# Patient Record
Sex: MALE | Race: BLACK OR AFRICAN AMERICAN | NOT HISPANIC OR LATINO
[De-identification: names, ages, dates, MRNs, and addresses within clinical notes are randomized per-mention and may not be internally consistent; named-entity substitution may affect disease eponyms.]

---

## 2016-12-29 NOTE — ED ADULT NURSE NOTE - PSH
AICD (Automatic Cardioverter/Defibrillator) Present  [employer] with 1 [employer] lead4/1/09- explanted and replaced with [employer] 2 leads on 9/2/09  History of Prior Ablation Treatment    Status post left hip replacement

## 2016-12-29 NOTE — ED ADULT NURSE NOTE - PMH
CHF (Congestive Heart Failure)    H/O prior ablation treatment  for Afib  HTN    Non-Ischemic Cardiomyopathy    PAF (paroxysmal atrial fibrillation)    SVT (Supraventricular Tachycardia)    Ventricular fibrillation

## 2016-12-29 NOTE — ED PROVIDER NOTE - PROGRESS NOTE DETAILS
MD CHO:  Discussed pt with his cardiologist, Dr. Kessler, requests admission for CHF exacerbation, will give lasix 40 mg.  Will call cardiology for admission.

## 2016-12-29 NOTE — ED PROVIDER NOTE - OBJECTIVE STATEMENT
65 y/o male with h/o aicd, non-isch CM, afib with sob x2 days, gradual onset.  Inc olivera today.  Feels more fatigued than baseline.  Denies f/c, ha, neck stiffness, cp, palpitations, abd pain, n/v/d, dysuria.

## 2016-12-29 NOTE — ED ADULT TRIAGE NOTE - CHIEF COMPLAINT QUOTE
Pt.  c/o sob since yesterday with dizziness. Pt states was walking today and became sob. Pt denies chest pain. Pt with AICD. Pt placed on o2 in triage.

## 2016-12-29 NOTE — ED PROVIDER NOTE - PSH
AICD (Automatic Cardioverter/Defibrillator) Present  [employer] with 1 [employer] lead4/1/09- explanted and replaced with [employer] 2 leads on 9/2/09  History of Prior Ablation Treatment    Status post left hip replacement

## 2016-12-29 NOTE — H&P ADULT. - PSH
AICD (Automatic Cardioverter/Defibrillator) Present  [employer] with 1 [employer] lead4/1/09- explanted and replaced with [employer] 2 leads on 9/2/09  History of Prior Ablation Treatment    Status post left hip replacement

## 2016-12-29 NOTE — H&P ADULT. - RS GEN PE MLT RESP DETAILS PC
airway patent/no rhonchi/good air movement/no wheezes/no rales/normal/clear to auscultation bilaterally/no chest wall tenderness/respirations non-labored

## 2016-12-29 NOTE — ED PROVIDER NOTE - CARE PLAN
Principal Discharge DX:	Acute congestive heart failure, unspecified congestive heart failure type  Secondary Diagnosis:	SOB (shortness of breath)

## 2016-12-29 NOTE — ED PROVIDER NOTE - MEDICAL DECISION MAKING DETAILS
65 y/o male with h/o non-isch cm, aicd here with sob and olivera x2 days, worsening.  CHF exac vs pna vs lyte abn vs acs.  Obtain cbc, bmp, ce's, bnp, cxr, ekg.  Antic admission.

## 2016-12-29 NOTE — H&P ADULT. - ASSESSMENT
65 y/o male, with a PmHx of PAF s/p ablation on Xarelto, AICD, CHF, presented with SOB and is being admitted to telemetry for acute on chronic heart failure.

## 2016-12-29 NOTE — H&P ADULT. - HISTORY OF PRESENT ILLNESS
65 y/o male, PMHx of [facility] ICD, A.fib (on Xarelto), HTN, presenting complaining of SOB x a couple of days and dizziness this morning. Patient states that over the past couple of days he has noticed that he finds himself having to "catch his breath." States that he notices his shortness of breath when he is walking, claiming that over the past couple of days he is only able to walk about 1 block before he feels short of breath. Claims he only feels SOB with exertion and is fine at rest. Patient states that this morning, around 10:30am, he also noticed he felt a little lightheaded and dizzy as he was walking out of his house. He described it as a spinning sensation, and says he felt like he was going to pass out but did not. Patient states he needed to hold onto the fence for support and went back inside his house to sit down. At that time his neighbor came over, called EMS and the patient was brought to the ED. The patient admits to having an intermittent, dry, non-productive cough that has been coming and going over the past week. Admits to a couple pound weight gain over the past month. He denies falling, LOC, changes in vision, chest pain, palpitations, wheezing, pleuritic chest pain, abdominal pain, claudication, PND, orthopnea, LE edema, back pain. He does not use home oxygen. Upon examination in the ED patient denies feeling lightheaded/dizzy, or short of breath.

## 2016-12-29 NOTE — H&P ADULT. - NEGATIVE CARDIOVASCULAR SYMPTOMS
no claudication/no palpitations/no orthopnea/no paroxysmal nocturnal dyspnea/no peripheral edema/no chest pain

## 2016-12-29 NOTE — H&P ADULT. - PROBLEM SELECTOR PLAN 1
Admit to telemetry.   Lasix 40 IV BID  Strict I&O's.  Daily Weights.   1500mL fluid restriction.  Obtain echocardiogram.   F/u MD note.

## 2016-12-30 NOTE — PHYSICAL THERAPY INITIAL EVALUATION ADULT - RANGE OF MOTION EXAMINATION, REHAB EVAL
bilateral lower extremity ROM was WFL (within functional limits)/+ bilateral genu varus/bilateral upper extremity ROM was WFL (within functional limits)

## 2017-01-01 NOTE — H&P ADULT. - ADMIT DATE
Central Line Procedure Note    Staff:     Anesthesiologist:  CELI JACKSON  Location: OR after induction    patient identified, IV checked, risks and benefits discussed, informed consent, monitors and equipment checked and pre-op evaluation    Line Placement:     Procedure:  Central Line    Insertion Site:  Femoral    Laterality:      Position:  Supine    Maximal Sterile Barriers: All elements of maximal sterile barrier technique used       (Maximal sterile barriers include:  Sterile gown, sterile gloves, hat, mask, whole body drape, hand hygiene and acceptable skin prep.)    Sterile Prep: Chloraprep        Local skin infiltration:  None    Injection Technique:  Ultrasound guided and Seldinger Technique    Sterile ultrasound technique:  Sterile Probe Cover and Sterile Gel    Vein evaluated via U/S for patency/adequacy of catheter insertion and is adequate.  Using realtime U/S imaging the vein was punctured, and needle was observed entering vein on U/S      A permanent image is NOT entered into the patient's record.      Catheter size:  4 Fr, 8 cm 2 lumen    Cath secured with: suture    Dressing:  Tegaderm and Biopatch    Complications:  None apparent    Blood aspirated all lumens: Yes      All Lumens Flushed: Yes      Verification method:  Placement to be verified post-op  Assessment/Narrative:      Attempted central venous catheter insertion in right internal jugular vein, aborted due to inability to thread wire, vein accessed x 3, successful insertion of left femoral central venous catheter with live US-guided Seldinger technique, catheter thread with ease, no complications.    Violetta Burr MD  Pediatric Anesthesiologist  Pager: [phone]             29-Dec-2016

## 2017-01-03 NOTE — DIETITIAN INITIAL EVALUATION ADULT. - OTHER INFO
Nutrition consult was ordered for RD visit. 57y/o male admitted with HTN, CHF, reports to have good po and appetite with no  GI Issues'. Pt  mentioned that he prefers fast food and seems to have lack of nutrition related knowledge. Labs reviewed , Current diet remain appropriate . Nutrition education provided  regarding therapeutic diet. RD remain available.

## 2017-01-03 NOTE — DIETITIAN INITIAL EVALUATION ADULT. - NS AS NUTRI INTERV ED CONTENT
Nutrition relationship to health/disease/Recommended modifications/Purpose of the nutrition education

## 2017-01-03 NOTE — DIETITIAN INITIAL EVALUATION ADULT. - NS AS NUTRI DX KNOWLEDGE BELIEFS
Unsupported beliefs/attitudes about food or nutrition-relate/Self - monitoring deficit/Food - and nutrition - related knowledge deficit

## 2017-01-04 ENCOUNTER — TRANSCRIPTION ENCOUNTER (OUTPATIENT)
Age: 67
End: 2017-01-04

## 2017-01-04 NOTE — DISCHARGE NOTE ADULT - NS MD DC FALL RISK RISK
For information on Fall & Injury Prevention, visit [email or web address]

## 2017-01-04 NOTE — DISCHARGE NOTE ADULT - CARE PROVIDER_API CALL
Pratima Noyola), Internal Medicine  [address] [facility] [place] [address]  Phone: [phone]  Fax: [phone] Ricky Kessler (MD), Cardiac Electrophysiology; Cardiovascular Disease; Internal Medicine  [address] [address] [place] [address]  Phone: [phone]  Fax: [phone]

## 2017-01-04 NOTE — DISCHARGE NOTE ADULT - PATIENT PORTAL LINK FT
“You can access the Follow[employer] Patient Portal, offered by [facility], by registering with the following website: [email or web address]”

## 2017-01-04 NOTE — DISCHARGE NOTE ADULT - CARE PLAN
Principal Discharge DX:	Acute congestive heart failure, unspecified congestive heart failure type  Goal:	Maintain euvolemia and prevent worsening of heart failure.  Instructions for follow-up, activity and diet:	please take less salt, monitor your fluid intake, follow up with your doctor in 1-2 weeks  Secondary Diagnosis:	Atrial fibrillation  Goal:	please continue to take your blood thinner as prescribed and follow with your doctor to monitor your levels  Instructions for follow-up, activity and diet:	please monitor your Heart rate, take your medications as prescribed, low salt, low fat, exercise as tolerated  Secondary Diagnosis:	Hypertension  Goal:	keep your blood pressure under control by taking your medications as prescribed  Instructions for follow-up, activity and diet:	low salt, low fat, exercise regularly, please follow up with your PMD in 1-2 weeks for further medical management Principal Discharge DX:	Acute congestive heart failure, unspecified congestive heart failure type  Goal:	Maintain euvolemia and prevent worsening of heart failure.  Instructions for follow-up, activity and diet:	please take less salt, monitor your fluid intake, follow up with your doctor in 1-2 weeks  Secondary Diagnosis:	Atrial fibrillation  Goal:	please continue to take your blood thinner as prescribed and follow with your doctor to monitor your levels  Instructions for follow-up, activity and diet:	please monitor your Heart rate, take your medications as prescribed, low salt, low fat, exercise as tolerated  Secondary Diagnosis:	Hypertension  Goal:	keep your blood pressure under control by taking your medications as prescribed  Instructions for follow-up, activity and diet:	low salt, low fat, exercise regularly, please follow up with your PMD in 1-2 weeks for further medical management  Secondary Diagnosis:	Ventricular tachycardia  Instructions for follow-up, activity and diet:	Please follow up with Dr. Kessler on 1/13/2017 @  12PM. Take your coreg as prescribed.

## 2017-01-04 NOTE — DISCHARGE NOTE ADULT - HOSPITAL COURSE
65 y/o male, with a PmHx of PAF s/p ablation on ac, AICD, CHF. Left Hip replacement, presented with SOB admitted to telemetry for acute on chronic heart failure.    + Acute on Chronic Heart failure - on Lasix 40 iv bid  + Prolonged QT: Tikosyn d/c'd 12/30  EKG: Afib @ 92, PVC's. T inv V4-6  EKG- QTC- 570>>539  CE x 2 neg                 BNP: 3,454  12/29 CXR - clear lungs  1/3 ECHO:  EF- 23%, 1. Thickened, tethered mitral valve leaflets.  Moderate mitral regurgitation. 2. Moderately dilated left atrium.  LA volume index = 42 cc/m2. 3. Normal left ventricular internal dimensions and wall thicknesses. 4. Severe global left ventricular systolic dysfunction. 5. Right ventricular enlargement with decreased right ventricular systolic function. 6. Normal pericardium with no pericardial effusion. *** Compared with echocardiogram of 9/1/2009, no significant changes noted. 65 y/o male, with a PmHx of PAF s/p ablation on ac, AICD, CHF. Left Hip replacement, presented with SOB admitted to telemetry for acute on chronic heart failure.    + Acute on Chronic Heart failure - on Lasix 40 iv bid  + Prolonged QT: Tikosyn d/c'd 12/30  EKG: Afib @ 92, PVC's. T inv V4-6  EKG- QTC- 570>>539  CE x 2 neg                 BNP: 3,454  12/29 CXR - clear lungs  1/3 TTE:  EF- 23%, 1. Thickened, tethered mitral valve leaflets.  Moderate mitral regurgitation. 2. Moderately dilated left atrium.  LA volume index = 42 cc/m2. 3. Normal left ventricular internal dimensions and wall thicknesses. 4. Severe global left ventricular systolic dysfunction. 5. Right ventricular enlargement with decreased right ventricular systolic function. 6. Normal pericardium with no pericardial effusion. *** Compared with echocardiogram of 9/1/2009, no significant changes noted.  . Co  ICD was interrogated showing  one episode of VF on 11/4/2016 which resolved after one shock. Tikosyn discontinued due to prolonged QT. Coreg increased to reduce risk of VT/VF. No antiarrythmic on discharge due to previous prolonged QT. May be added as outpatient. Cleared for discharge.

## 2017-01-04 NOTE — DISCHARGE NOTE ADULT - PLAN OF CARE
Maintain euvolemia and prevent worsening of heart failure. please take less salt, monitor your fluid intake, follow up with your doctor in 1-2 weeks please continue to take your blood thinner as prescribed and follow with your doctor to monitor your levels please monitor your Heart rate, take your medications as prescribed, low salt, low fat, exercise as tolerated keep your blood pressure under control by taking your medications as prescribed low salt, low fat, exercise regularly, please follow up with your PMD in 1-2 weeks for further medical management Please follow up with Dr. Kessler on 1/13/2017 @  12PM. Take your coreg as prescribed.

## 2017-01-04 NOTE — DISCHARGE NOTE ADULT - MEDICATION SUMMARY - MEDICATIONS TO TAKE
I will START or STAY ON the medications listed below when I get home from the hospital:    enalapril 2.5 mg oral tablet  -- 1 tab(s) by mouth once a day  -- Indication: For Hypertension    Xarelto 20 mg oral tablet  -- 1 tab(s) by mouth once a day (in the evening)  -- Indication: For Atrial fibrillation    carvedilol 25 mg oral tablet  -- 1 tab(s) by mouth every 12 hours  -- Indication: For Ventricular Tachycardia    furosemide 40 mg oral tablet  -- 1 tab(s) by mouth 2 times a day  -- Indication: For Heart failure    folic acid 1 mg oral tablet  -- 1 tab(s) by mouth once a day  -- Indication: For Supplement

## 2017-01-04 NOTE — DISCHARGE NOTE ADULT - CARE PROVIDERS DIRECT ADDRESSES
[email or web address],DirectAddress_Unknown [email or web address],DirectAddress_Unknown

## 2017-01-04 NOTE — DISCHARGE NOTE ADULT - MEDICATION SUMMARY - MEDICATIONS TO STOP TAKING
I will STOP taking the medications listed below when I get home from the hospital:    Tikosyn 250 mcg oral capsule  -- 1 cap(s) by mouth 2 times a day

## 2017-01-04 NOTE — DISCHARGE NOTE ADULT - MEDICATION SUMMARY - MEDICATIONS TO CHANGE
I will SWITCH the dose or number of times a day I take the medications listed below when I get home from the hospital:    furosemide 40 mg oral tablet  -- 1 tab(s) by mouth once a day    enalapril 10 mg oral tablet  -- 1 tab(s) by mouth once a day    carvedilol 12.5 mg oral tablet  -- 1 tab(s) by mouth 2 times a day

## 2017-01-13 ENCOUNTER — NON-APPOINTMENT (OUTPATIENT)
Age: 67
End: 2017-01-13

## 2017-01-13 ENCOUNTER — APPOINTMENT (OUTPATIENT)
Dept: ELECTROPHYSIOLOGY | Facility: CLINIC | Age: 67
End: 2017-01-13

## 2017-01-13 VITALS
BODY MASS INDEX: 25.31 KG/M2 | WEIGHT: 167 LBS | DIASTOLIC BLOOD PRESSURE: 70 MMHG | SYSTOLIC BLOOD PRESSURE: 90 MMHG | HEIGHT: 68 IN

## 2017-01-17 ENCOUNTER — APPOINTMENT (OUTPATIENT)
Dept: ORTHOPEDIC SURGERY | Facility: CLINIC | Age: 67
End: 2017-01-17

## 2017-01-23 ENCOUNTER — INPATIENT (INPATIENT)
Facility: HOSPITAL | Age: 67
LOS: 6 days | Discharge: HOME | End: 2017-01-30
Attending: INTERNAL MEDICINE | Admitting: INTERNAL MEDICINE
Payer: MEDICARE

## 2017-01-23 VITALS
HEART RATE: 92 BPM | TEMPERATURE: 98 F | SYSTOLIC BLOOD PRESSURE: 98 MMHG | OXYGEN SATURATION: 94 % | DIASTOLIC BLOOD PRESSURE: 58 MMHG | RESPIRATION RATE: 16 BRPM

## 2017-01-23 DIAGNOSIS — R94.31 ABNORMAL ELECTROCARDIOGRAM [ECG] [EKG]: ICD-10-CM

## 2017-01-23 DIAGNOSIS — I50.9 HEART FAILURE, UNSPECIFIED: ICD-10-CM

## 2017-01-23 LAB
ALBUMIN SERPL ELPH-MCNC: 3.8 G/DL — SIGNIFICANT CHANGE UP (ref 3.3–5)
ALP SERPL-CCNC: 66 U/L — SIGNIFICANT CHANGE UP (ref 40–120)
ALT FLD-CCNC: 36 U/L — SIGNIFICANT CHANGE UP (ref 4–41)
APTT BLD: 36 SEC — SIGNIFICANT CHANGE UP (ref 27.5–37.4)
AST SERPL-CCNC: 33 U/L — SIGNIFICANT CHANGE UP (ref 4–40)
BASOPHILS # BLD AUTO: 0.02 K/UL — SIGNIFICANT CHANGE UP (ref 0–0.2)
BASOPHILS NFR BLD AUTO: 0.3 % — SIGNIFICANT CHANGE UP (ref 0–2)
BILIRUB SERPL-MCNC: 0.9 MG/DL — SIGNIFICANT CHANGE UP (ref 0.2–1.2)
BUN SERPL-MCNC: 36 MG/DL — HIGH (ref 7–23)
CALCIUM SERPL-MCNC: 8.8 MG/DL — SIGNIFICANT CHANGE UP (ref 8.4–10.5)
CHLORIDE SERPL-SCNC: 109 MMOL/L — HIGH (ref 98–107)
CK MB BLD-MCNC: 2.01 NG/ML — SIGNIFICANT CHANGE UP (ref 1–6.6)
CK MB BLD-MCNC: SIGNIFICANT CHANGE UP (ref 0–2.5)
CK SERPL-CCNC: 90 U/L — SIGNIFICANT CHANGE UP (ref 30–200)
CO2 SERPL-SCNC: 20 MMOL/L — LOW (ref 22–31)
CREAT SERPL-MCNC: 1.4 MG/DL — HIGH (ref 0.5–1.3)
EOSINOPHIL # BLD AUTO: 0.1 K/UL — SIGNIFICANT CHANGE UP (ref 0–0.5)
EOSINOPHIL NFR BLD AUTO: 1.4 % — SIGNIFICANT CHANGE UP (ref 0–6)
GLUCOSE SERPL-MCNC: 85 MG/DL — SIGNIFICANT CHANGE UP (ref 70–99)
HCT VFR BLD CALC: 38.7 % — LOW (ref 39–50)
HGB BLD-MCNC: 12.2 G/DL — LOW (ref 13–17)
IMM GRANULOCYTES NFR BLD AUTO: 0.1 % — SIGNIFICANT CHANGE UP (ref 0–1.5)
INR BLD: 1.72 — HIGH (ref 0.87–1.18)
LYMPHOCYTES # BLD AUTO: 2.32 K/UL — SIGNIFICANT CHANGE UP (ref 1–3.3)
LYMPHOCYTES # BLD AUTO: 31.6 % — SIGNIFICANT CHANGE UP (ref 13–44)
MAGNESIUM SERPL-MCNC: 2.4 MG/DL — SIGNIFICANT CHANGE UP (ref 1.6–2.6)
MCHC RBC-ENTMCNC: 31.1 PG — SIGNIFICANT CHANGE UP (ref 27–34)
MCHC RBC-ENTMCNC: 31.5 % — LOW (ref 32–36)
MCV RBC AUTO: 98.7 FL — SIGNIFICANT CHANGE UP (ref 80–100)
MONOCYTES # BLD AUTO: 0.71 K/UL — SIGNIFICANT CHANGE UP (ref 0–0.9)
MONOCYTES NFR BLD AUTO: 9.7 % — SIGNIFICANT CHANGE UP (ref 2–14)
NEUTROPHILS # BLD AUTO: 4.19 K/UL — SIGNIFICANT CHANGE UP (ref 1.8–7.4)
NEUTROPHILS NFR BLD AUTO: 56.9 % — SIGNIFICANT CHANGE UP (ref 43–77)
NT-PROBNP SERPL-SCNC: 5900 PG/ML — SIGNIFICANT CHANGE UP
PLATELET # BLD AUTO: 239 K/UL — SIGNIFICANT CHANGE UP (ref 150–400)
PMV BLD: 11.5 FL — SIGNIFICANT CHANGE UP (ref 7–13)
POTASSIUM SERPL-MCNC: 4.8 MMOL/L — SIGNIFICANT CHANGE UP (ref 3.5–5.3)
POTASSIUM SERPL-SCNC: 4.8 MMOL/L — SIGNIFICANT CHANGE UP (ref 3.5–5.3)
PROT SERPL-MCNC: 7.3 G/DL — SIGNIFICANT CHANGE UP (ref 6–8.3)
PROTHROM AB SERPL-ACNC: 19.7 SEC — HIGH (ref 10–13.1)
RBC # BLD: 3.92 M/UL — LOW (ref 4.2–5.8)
RBC # FLD: 16.1 % — HIGH (ref 10.3–14.5)
SODIUM SERPL-SCNC: 144 MMOL/L — SIGNIFICANT CHANGE UP (ref 135–145)
TROPONIN T SERPL-MCNC: < 0.06 NG/ML — SIGNIFICANT CHANGE UP (ref 0–0.06)
WBC # BLD: 7.35 K/UL — SIGNIFICANT CHANGE UP (ref 3.8–10.5)
WBC # FLD AUTO: 7.35 K/UL — SIGNIFICANT CHANGE UP (ref 3.8–10.5)

## 2017-01-23 PROCEDURE — 71010: CPT | Mod: 26

## 2017-01-23 RX ORDER — ASPIRIN/CALCIUM CARB/MAGNESIUM 324 MG
162 TABLET ORAL ONCE
Qty: 0 | Refills: 0 | Status: COMPLETED | OUTPATIENT
Start: 2017-01-23 | End: 2017-01-23

## 2017-01-23 RX ORDER — FUROSEMIDE 40 MG
40 TABLET ORAL ONCE
Qty: 0 | Refills: 0 | Status: COMPLETED | OUTPATIENT
Start: 2017-01-23 | End: 2017-01-23

## 2017-01-23 RX ADMIN — Medication 162 MILLIGRAM(S): at 18:38

## 2017-01-23 RX ADMIN — Medication 40 MILLIGRAM(S): at 18:38

## 2017-01-23 NOTE — ED PROVIDER NOTE - PSH
AICD (Automatic Cardioverter/Defibrillator) Present  [employer] with 1 [employer] lead4/1/09- explanted and replaced with [employer] 2 leads on 9/2/09  History of Prior Ablation Treatment    Status post left hip replacement

## 2017-01-23 NOTE — ED ADULT TRIAGE NOTE - CHIEF COMPLAINT QUOTE
Arrives via EMS with c/o SOB, but as per pt SOB is located in abdomen.  Pt denies any chest pain.  Currently on O2 4L NC.

## 2017-01-23 NOTE — ED ADULT NURSE NOTE - OBJECTIVE STATEMENT
(break hortensia RN) Rec'd 65 YO M in rm 3, c/o SOB/LOBATO since last night. Pt states he was hospitalized here last week for CHF. Pt A&OX3, NAD, VSS as noted. +tachypnea. +crackles bilat. +2 pedal edema bilat. IV accessed and labs sent by facilitator. PT medicated per order. Family at bedside. Will monitor. RF

## 2017-01-23 NOTE — ED ADULT NURSE NOTE - PSH
AICD (Automatic Cardioverter/Defibrillator) Present  [employer] with 1 [employer] lead4/1/09- explanted and replaced with [employer] 2 leads on 9/2/09  History of Prior Ablation Treatment    Status post left hip replacement

## 2017-01-23 NOTE — ED PROVIDER NOTE - MEDICAL DECISION MAKING DETAILS
67yo male with a pmh of PAF s/p ablation on xarelto, AICD, CHF. Left Hip replacement, presented with SOB for 1 day - r/o acs vs chf exacerbation

## 2017-01-23 NOTE — ED PROVIDER NOTE - ATTENDING CONTRIBUTION TO CARE
ED Attending Dr. Adkins: 67 yo male with atrial fibrillation s/p ablation on Xarelto, AICD, CHF, in ED with SOB x 1 day.  No N/V/D or worsening LE edema.  On exam pt in mild respiratory distress, heart irregular, lungs coarse at bases, abd NTND, lower extremities with 1-2+ edema bilaterally, strength 5/5 in all extremities and skin without rash.

## 2017-01-23 NOTE — ED PROVIDER NOTE - PROGRESS NOTE DETAILS
Discussed need to admit with patient & discussed risk and benefits.  Patient agreed to admission.  Discussed case w/ admitting cardiologist - agreed to admit to their service.  Tele PA contacted.

## 2017-01-24 DIAGNOSIS — I10 ESSENTIAL (PRIMARY) HYPERTENSION: ICD-10-CM

## 2017-01-24 DIAGNOSIS — I50.23 ACUTE ON CHRONIC SYSTOLIC (CONGESTIVE) HEART FAILURE: ICD-10-CM

## 2017-01-24 DIAGNOSIS — Z41.8 ENCOUNTER FOR OTHER PROCEDURES FOR PURPOSES OTHER THAN REMEDYING HEALTH STATE: ICD-10-CM

## 2017-01-24 DIAGNOSIS — I48.0 PAROXYSMAL ATRIAL FIBRILLATION: ICD-10-CM

## 2017-01-24 DIAGNOSIS — N17.9 ACUTE KIDNEY FAILURE, UNSPECIFIED: ICD-10-CM

## 2017-01-24 LAB
ALBUMIN SERPL ELPH-MCNC: 3.6 G/DL — SIGNIFICANT CHANGE UP (ref 3.3–5)
ALP SERPL-CCNC: 62 U/L — SIGNIFICANT CHANGE UP (ref 40–120)
ALT FLD-CCNC: 34 U/L — SIGNIFICANT CHANGE UP (ref 4–41)
AMORPH CRY # UR COMP ASSIST: SIGNIFICANT CHANGE UP (ref 0–0)
APPEARANCE UR: CLEAR — SIGNIFICANT CHANGE UP
AST SERPL-CCNC: 30 U/L — SIGNIFICANT CHANGE UP (ref 4–40)
BILIRUB SERPL-MCNC: 0.9 MG/DL — SIGNIFICANT CHANGE UP (ref 0.2–1.2)
BILIRUB UR-MCNC: NEGATIVE — SIGNIFICANT CHANGE UP
BLOOD UR QL VISUAL: NEGATIVE — SIGNIFICANT CHANGE UP
BUN SERPL-MCNC: 35 MG/DL — HIGH (ref 7–23)
CALCIUM SERPL-MCNC: 8.9 MG/DL — SIGNIFICANT CHANGE UP (ref 8.4–10.5)
CHLORIDE SERPL-SCNC: 106 MMOL/L — SIGNIFICANT CHANGE UP (ref 98–107)
CHOLEST SERPL-MCNC: 100 MG/DL — LOW (ref 120–199)
CK MB BLD-MCNC: 1.83 NG/ML — SIGNIFICANT CHANGE UP (ref 1–6.6)
CK MB BLD-MCNC: SIGNIFICANT CHANGE UP (ref 0–2.5)
CK SERPL-CCNC: 77 U/L — SIGNIFICANT CHANGE UP (ref 30–200)
CO2 SERPL-SCNC: 23 MMOL/L — SIGNIFICANT CHANGE UP (ref 22–31)
COLOR SPEC: SIGNIFICANT CHANGE UP
CREAT SERPL-MCNC: 1.26 MG/DL — SIGNIFICANT CHANGE UP (ref 0.5–1.3)
GLUCOSE SERPL-MCNC: 90 MG/DL — SIGNIFICANT CHANGE UP (ref 70–99)
GLUCOSE UR-MCNC: NEGATIVE — SIGNIFICANT CHANGE UP
HBA1C BLD-MCNC: 5.5 % — SIGNIFICANT CHANGE UP (ref 4–5.6)
HCT VFR BLD CALC: 36.8 % — LOW (ref 39–50)
HDLC SERPL-MCNC: 23 MG/DL — LOW (ref 35–55)
HGB BLD-MCNC: 11.6 G/DL — LOW (ref 13–17)
HYALINE CASTS # UR AUTO: SIGNIFICANT CHANGE UP (ref 0–?)
KETONES UR-MCNC: NEGATIVE — SIGNIFICANT CHANGE UP
LEUKOCYTE ESTERASE UR-ACNC: NEGATIVE — SIGNIFICANT CHANGE UP
LIPID PNL WITH DIRECT LDL SERPL: 58 MG/DL — SIGNIFICANT CHANGE UP
MAGNESIUM SERPL-MCNC: 2.5 MG/DL — SIGNIFICANT CHANGE UP (ref 1.6–2.6)
MCHC RBC-ENTMCNC: 31 PG — SIGNIFICANT CHANGE UP (ref 27–34)
MCHC RBC-ENTMCNC: 31.5 % — LOW (ref 32–36)
MCV RBC AUTO: 98.4 FL — SIGNIFICANT CHANGE UP (ref 80–100)
MUCOUS THREADS # UR AUTO: SIGNIFICANT CHANGE UP
NITRITE UR-MCNC: NEGATIVE — SIGNIFICANT CHANGE UP
PH UR: 5.5 — SIGNIFICANT CHANGE UP (ref 4.6–8)
PHOSPHATE SERPL-MCNC: 4.2 MG/DL — SIGNIFICANT CHANGE UP (ref 2.5–4.5)
PLATELET # BLD AUTO: 212 K/UL — SIGNIFICANT CHANGE UP (ref 150–400)
PMV BLD: 11.6 FL — SIGNIFICANT CHANGE UP (ref 7–13)
POTASSIUM SERPL-MCNC: 4.2 MMOL/L — SIGNIFICANT CHANGE UP (ref 3.5–5.3)
POTASSIUM SERPL-SCNC: 4.2 MMOL/L — SIGNIFICANT CHANGE UP (ref 3.5–5.3)
PROT SERPL-MCNC: 7 G/DL — SIGNIFICANT CHANGE UP (ref 6–8.3)
PROT UR-MCNC: NEGATIVE — SIGNIFICANT CHANGE UP
RBC # BLD: 3.74 M/UL — LOW (ref 4.2–5.8)
RBC # FLD: 15.9 % — HIGH (ref 10.3–14.5)
RBC CASTS # UR COMP ASSIST: SIGNIFICANT CHANGE UP (ref 0–?)
SODIUM SERPL-SCNC: 143 MMOL/L — SIGNIFICANT CHANGE UP (ref 135–145)
SP GR SPEC: 1.01 — SIGNIFICANT CHANGE UP (ref 1–1.03)
TRIGL SERPL-MCNC: 109 MG/DL — SIGNIFICANT CHANGE UP (ref 10–149)
TROPONIN T SERPL-MCNC: < 0.06 NG/ML — SIGNIFICANT CHANGE UP (ref 0–0.06)
TSH SERPL-MCNC: 1.31 UIU/ML — SIGNIFICANT CHANGE UP (ref 0.27–4.2)
UROBILINOGEN FLD QL: NORMAL E.U. — SIGNIFICANT CHANGE UP (ref 0.1–0.2)
WBC # BLD: 6.92 K/UL — SIGNIFICANT CHANGE UP (ref 3.8–10.5)
WBC # FLD AUTO: 6.92 K/UL — SIGNIFICANT CHANGE UP (ref 3.8–10.5)
WBC UR QL: SIGNIFICANT CHANGE UP (ref 0–?)

## 2017-01-24 PROCEDURE — 93283 PRGRMG EVAL IMPLANTABLE DFB: CPT | Mod: 26

## 2017-01-24 PROCEDURE — 99232 SBSQ HOSP IP/OBS MODERATE 35: CPT | Mod: 25

## 2017-01-24 PROCEDURE — 99233 SBSQ HOSP IP/OBS HIGH 50: CPT

## 2017-01-24 RX ORDER — CARVEDILOL PHOSPHATE 80 MG/1
25 CAPSULE, EXTENDED RELEASE ORAL EVERY 12 HOURS
Qty: 0 | Refills: 0 | Status: DISCONTINUED | OUTPATIENT
Start: 2017-01-24 | End: 2017-01-25

## 2017-01-24 RX ORDER — FUROSEMIDE 40 MG
40 TABLET ORAL
Qty: 0 | Refills: 0 | Status: DISCONTINUED | OUTPATIENT
Start: 2017-01-24 | End: 2017-01-24

## 2017-01-24 RX ORDER — FOLIC ACID 0.8 MG
1 TABLET ORAL DAILY
Qty: 0 | Refills: 0 | Status: DISCONTINUED | OUTPATIENT
Start: 2017-01-24 | End: 2017-01-30

## 2017-01-24 RX ORDER — CARVEDILOL PHOSPHATE 80 MG/1
12.5 CAPSULE, EXTENDED RELEASE ORAL EVERY 12 HOURS
Qty: 0 | Refills: 0 | Status: DISCONTINUED | OUTPATIENT
Start: 2017-01-24 | End: 2017-01-24

## 2017-01-24 RX ORDER — FUROSEMIDE 40 MG
40 TABLET ORAL ONCE
Qty: 0 | Refills: 0 | Status: DISCONTINUED | OUTPATIENT
Start: 2017-01-24 | End: 2017-01-24

## 2017-01-24 RX ORDER — VALSARTAN 80 MG/1
40 TABLET ORAL
Qty: 0 | Refills: 0 | Status: DISCONTINUED | OUTPATIENT
Start: 2017-01-24 | End: 2017-01-24

## 2017-01-24 RX ORDER — VALSARTAN 80 MG/1
40 TABLET ORAL
Qty: 0 | Refills: 0 | Status: DISCONTINUED | OUTPATIENT
Start: 2017-01-24 | End: 2017-01-25

## 2017-01-24 RX ORDER — FUROSEMIDE 40 MG
80 TABLET ORAL
Qty: 0 | Refills: 0 | Status: DISCONTINUED | OUTPATIENT
Start: 2017-01-24 | End: 2017-01-27

## 2017-01-24 RX ORDER — RIVAROXABAN 15 MG-20MG
20 KIT ORAL DAILY
Qty: 0 | Refills: 0 | Status: DISCONTINUED | OUTPATIENT
Start: 2017-01-24 | End: 2017-01-30

## 2017-01-24 RX ADMIN — Medication 1 MILLIGRAM(S): at 12:49

## 2017-01-24 RX ADMIN — CARVEDILOL PHOSPHATE 12.5 MILLIGRAM(S): 80 CAPSULE, EXTENDED RELEASE ORAL at 06:38

## 2017-01-24 RX ADMIN — RIVAROXABAN 20 MILLIGRAM(S): KIT at 12:49

## 2017-01-24 RX ADMIN — VALSARTAN 40 MILLIGRAM(S): 80 TABLET ORAL at 23:12

## 2017-01-24 RX ADMIN — Medication 40 MILLIGRAM(S): at 06:39

## 2017-01-24 RX ADMIN — Medication 80 MILLIGRAM(S): at 20:22

## 2017-01-24 RX ADMIN — CARVEDILOL PHOSPHATE 25 MILLIGRAM(S): 80 CAPSULE, EXTENDED RELEASE ORAL at 21:27

## 2017-01-24 NOTE — H&P ADULT. - NEGATIVE OPHTHALMOLOGIC SYMPTOMS
no photophobia/no diplopia no discharge L/no lacrimation R/no lacrimation L/no blurred vision L/no diplopia/no photophobia/no discharge R/no blurred vision R

## 2017-01-24 NOTE — ED ADULT NURSE REASSESSMENT NOTE - NS ED NURSE REASSESS COMMENT FT1
Patient resting comfortably in bed and in no apparent distress.  Patient denies SOB, chest pain, or dizziness at this time.  Patient waiting transport to the floor.  Will continue to monitor patient.

## 2017-01-24 NOTE — H&P ADULT. - NEGATIVE NEUROLOGICAL SYMPTOMS
no paresthesias/no headache/no weakness/no loss of sensation/no vertigo no paresthesias/no headache/no weakness/no syncope/no transient paralysis/no generalized seizures/no hemiparesis/no loss of consciousness/no confusion/no difficulty walking/no loss of sensation/no tremors/no focal seizures/no vertigo

## 2017-01-24 NOTE — H&P ADULT. - NEGATIVE GENERAL GENITOURINARY SYMPTOMS
no dysuria/no incontinence/no hematuria no flank pain R/no flank pain L/no dysuria/no hematuria/no renal colic

## 2017-01-24 NOTE — H&P ADULT. - NEGATIVE MUSCULOSKELETAL SYMPTOMS
no muscle weakness/no back pain no joint swelling/no arthritis/no neck pain/no arthralgia/no muscle weakness/no myalgia/no muscle cramps/no stiffness

## 2017-01-24 NOTE — H&P ADULT. - ASSESSMENT
67 y/o M with PMH of PAF(s/p ablation and currently on Xarelto), NICM, VFIB/SVT(s/p AICD) presented with the complaint of SOB x 1 day. R/o ACS vs CHF exacerbation     +Acute on chronic systolic CHF-On IV Lasix 40mg BID

## 2017-01-24 NOTE — H&P ADULT. - NEGATIVE GENERAL SYMPTOMS
no chills/no weight loss/no anorexia/no weight gain/no sweating/no fever no malaise/no weight gain/no fever/no chills/no weight loss/no anorexia/no fatigue/no sweating

## 2017-01-24 NOTE — H&P ADULT. - HISTORY OF PRESENT ILLNESS
67 y/o male with a hx of CHF, ACID, PAF s/p ablation on Xarelto, and left hip replacement presents to the ED bib EMS c/o gradual worsening, waxing and waning, moderate, SOB associated with dizziness for 1 day. The patient reports he woke up at 3am SOB. The patient reports the SOB worsens when he exerts himself but is improved when the patient stops to rest. The patient states he had a similar episode last month for his acute, on chronic CHF exacerbation. The patient denies, fever chills, HA, CP, palpitations, numbness, cold/heart intolerance, n/v/d, abdominal pain, cough, hemoptysis, dysuria, change in vision, change in hearing, back pain, arm pain, jaw pain, edema diaphoresis. Patient much improved s/p 40mg IV lasix    Cardiology: Admit to Telemetry, start on Lasix 40 mg IV BID, c/w all other meds, strict I & O, consult for ICD interrogation, check CBC, BMP in the AM. Keep Mag >2 K+ 4 67 y/o M with PMH of PAF(s/p ablation and currently on Xarelto), NICM, VFIB/SVT(s/p AICD) presented with the complaint of SOB x 1 day. As per the patient he has normally baseline SOB but for the past 1 day he developed worsening SOB. Patient stated that even walking 1 block he would have to stop multiple times to catch his breath. Patient stated that he has been taking his heart medications(especially Lasix) but was told recently that "he is taking his medications wrong". Patient also stated that he limits his salt intake with his food. Patient stated that when he rest the SOB resolves and when he even ambulates a little he would feel SOB but is worse with climbing a flight of stairs. Patient stated that he normally sleeps with 1 pillow at night which is chronic for him, and denied any PND or orthopnea. Patient denied any CP, fevers, chills, melena, hematochezia, recent travel, sick contact, dysuria, abdominal pain, cough, bilateral LE swelling, pleuritic or positional chest pain.     On ED admission EKG revealed Afib at a rate of 89 with Qtc of 493 and ST depression in lead V1-V6, CE x 1: Negative, H&H: 12.2/38.7, BNP: 5900, BUN/Cr: 36/1.40, UA: Negative. Prelim CXR: No urgent/emergent findings. Patient was seen by house cardiology who recommended admit to telemetry, Start Lasix 40mg IVP BID, continue with all other medications, strict I&O, EP consult for ICD interrogation. Patient received 1x dose of Lasix 40mg in the ED. When examined patient is resting in the stretcher and denied any current CP or SOB.

## 2017-01-24 NOTE — H&P ADULT. - NEUROLOGICAL DETAILS
alert and oriented x 3/sensation intact/normal strength/cranial nerves intact alert and oriented x 3/responds to verbal commands/sensation intact

## 2017-01-24 NOTE — H&P ADULT. - NEGATIVE RESPIRATORY AND THORAX SYMPTOMS
no hemoptysis/no cough/no pleuritic chest pain no hemoptysis/no pleuritic chest pain/no wheezing/no cough

## 2017-01-24 NOTE — H&P ADULT. - NEGATIVE ENMT SYMPTOMS
no nasal obstruction/no ear pain/no vertigo/no hearing difficulty/no tinnitus no nasal discharge/no nasal obstruction/no vertigo/no tinnitus/no nasal congestion/no ear pain/no sinus symptoms/no hearing difficulty

## 2017-01-24 NOTE — H&P ADULT. - PSH
AICD (Automatic Cardioverter/Defibrillator) Present  [employer] with 1 [employer] lead4/1/09- explanted and replaced with [employer] 2 leads on 9/2/09  History of Prior Ablation Treatment    Status post left hip replacement

## 2017-01-24 NOTE — H&P ADULT. - RS GEN PE MLT RESP DETAILS PC
airway patent/respirations non-labored/no rales/no intercostal retractions/normal/no chest wall tenderness/wheezes/no rhonchi normal/airway patent/no chest wall tenderness/no rhonchi/respirations non-labored/no intercostal retractions/rales/no wheezes

## 2017-01-24 NOTE — H&P ADULT. - NEGATIVE GASTROINTESTINAL SYMPTOMS
no melena/no change in bowel habits/no nausea/no vomiting/no diarrhea/no steatorrhea/no hematochezia/no abdominal pain/no constipation

## 2017-01-24 NOTE — H&P ADULT. - PROBLEM SELECTOR PLAN 2
Continue with Xarelto 20mg daily for anticoagulation  Continue with Coreg 12.5mg BID for rate control

## 2017-01-24 NOTE — H&P ADULT. - PROBLEM SELECTOR PLAN 4
BUN/Cr: 36/1.40 likely in the setting of cardiorenal picture  Will hold off on Ace-i/Arb in the setting of elevated Cr  Avoid nephrotoxic medications   Consider renal consult in am if Cr does not improve, will trend with daily BMP

## 2017-01-24 NOTE — PHYSICAL THERAPY INITIAL EVALUATION ADULT - PATIENT PROFILE REVIEW, REHAB EVAL
yes/activity order- bed rest /increase as tolerated, OK to ambulate the patient as per DIXON Bernabe

## 2017-01-24 NOTE — H&P ADULT. - NEGATIVE CARDIOVASCULAR SYMPTOMS
no palpitations/no chest pain/no peripheral edema no peripheral edema/no chest pain/no palpitations/no paroxysmal nocturnal dyspnea/no orthopnea

## 2017-01-24 NOTE — PHYSICAL THERAPY INITIAL EVALUATION ADULT - GENERAL OBSERVATIONS, REHAB EVAL
Patient received sitting at the EOB ,on O2 , in NAD, (+) tele monitor , bow legged, waddling gait,SpO2 with O2 prior to activities 97% ,after activities without O2  SpO2 100% without any signs of SOB, RN made aware.

## 2017-01-24 NOTE — H&P ADULT. - GASTROINTESTINAL DETAILS
no distention/bowel sounds normal/soft/nontender/no guarding/no rigidity/no rebound tenderness/normal

## 2017-01-24 NOTE — H&P ADULT. - PROBLEM SELECTOR PLAN 1
Admit to telemetry, serial Jordan, serial EKGs  f/u MD note   HgbA1C, TSH, lipid profile, CBC, CMP in am   Low sodium diet, daily weights, monitor I's and O's, fluid restrictions 1500cc/day  Check BNP in 48 hours   Started on Lasix IV 40mg BID   House cardiology following and recommended "Admit to telemetry, Start Lasix 40mg IVP BID, continue with all other medications, strict I&O, EP consult for ICD interrogation"  Will need to call EP in am for AICD interrogation([employer])

## 2017-01-24 NOTE — H&P ADULT. - MUSCULOSKELETAL
details… detailed exam no joint swelling/no joint erythema/ROM intact no joint warmth/no calf tenderness/no joint erythema

## 2017-01-25 LAB
BUN SERPL-MCNC: 31 MG/DL — HIGH (ref 7–23)
CALCIUM SERPL-MCNC: 8.8 MG/DL — SIGNIFICANT CHANGE UP (ref 8.4–10.5)
CHLORIDE SERPL-SCNC: 103 MMOL/L — SIGNIFICANT CHANGE UP (ref 98–107)
CO2 SERPL-SCNC: 24 MMOL/L — SIGNIFICANT CHANGE UP (ref 22–31)
CREAT SERPL-MCNC: 1.21 MG/DL — SIGNIFICANT CHANGE UP (ref 0.5–1.3)
GLUCOSE SERPL-MCNC: 85 MG/DL — SIGNIFICANT CHANGE UP (ref 70–99)
HCT VFR BLD CALC: 37.2 % — LOW (ref 39–50)
HGB BLD-MCNC: 11.8 G/DL — LOW (ref 13–17)
MAGNESIUM SERPL-MCNC: 2.2 MG/DL — SIGNIFICANT CHANGE UP (ref 1.6–2.6)
MCHC RBC-ENTMCNC: 31.2 PG — SIGNIFICANT CHANGE UP (ref 27–34)
MCHC RBC-ENTMCNC: 31.7 % — LOW (ref 32–36)
MCV RBC AUTO: 98.4 FL — SIGNIFICANT CHANGE UP (ref 80–100)
PLATELET # BLD AUTO: 219 K/UL — SIGNIFICANT CHANGE UP (ref 150–400)
PMV BLD: 11.2 FL — SIGNIFICANT CHANGE UP (ref 7–13)
POTASSIUM SERPL-MCNC: 4.1 MMOL/L — SIGNIFICANT CHANGE UP (ref 3.5–5.3)
POTASSIUM SERPL-SCNC: 4.1 MMOL/L — SIGNIFICANT CHANGE UP (ref 3.5–5.3)
RBC # BLD: 3.78 M/UL — LOW (ref 4.2–5.8)
RBC # FLD: 16 % — HIGH (ref 10.3–14.5)
SODIUM SERPL-SCNC: 142 MMOL/L — SIGNIFICANT CHANGE UP (ref 135–145)
WBC # BLD: 6.7 K/UL — SIGNIFICANT CHANGE UP (ref 3.8–10.5)
WBC # FLD AUTO: 6.7 K/UL — SIGNIFICANT CHANGE UP (ref 3.8–10.5)

## 2017-01-25 PROCEDURE — 99233 SBSQ HOSP IP/OBS HIGH 50: CPT

## 2017-01-25 PROCEDURE — 99232 SBSQ HOSP IP/OBS MODERATE 35: CPT

## 2017-01-25 RX ORDER — VALSARTAN 80 MG/1
80 TABLET ORAL
Qty: 0 | Refills: 0 | Status: DISCONTINUED | OUTPATIENT
Start: 2017-01-26 | End: 2017-01-26

## 2017-01-25 RX ORDER — METOPROLOL TARTRATE 50 MG
50 TABLET ORAL DAILY
Qty: 0 | Refills: 0 | Status: DISCONTINUED | OUTPATIENT
Start: 2017-01-25 | End: 2017-01-25

## 2017-01-25 RX ORDER — IPRATROPIUM/ALBUTEROL SULFATE 18-103MCG
3 AEROSOL WITH ADAPTER (GRAM) INHALATION ONCE
Qty: 0 | Refills: 0 | Status: COMPLETED | OUTPATIENT
Start: 2017-01-25 | End: 2017-01-25

## 2017-01-25 RX ORDER — INFLUENZA VIRUS VACCINE 15; 15; 15; 15 UG/.5ML; UG/.5ML; UG/.5ML; UG/.5ML
0.5 SUSPENSION INTRAMUSCULAR ONCE
Qty: 0 | Refills: 0 | Status: DISCONTINUED | OUTPATIENT
Start: 2017-01-25 | End: 2017-01-30

## 2017-01-25 RX ORDER — CARVEDILOL PHOSPHATE 80 MG/1
25 CAPSULE, EXTENDED RELEASE ORAL EVERY 12 HOURS
Qty: 0 | Refills: 0 | Status: DISCONTINUED | OUTPATIENT
Start: 2017-01-25 | End: 2017-01-26

## 2017-01-25 RX ADMIN — CARVEDILOL PHOSPHATE 25 MILLIGRAM(S): 80 CAPSULE, EXTENDED RELEASE ORAL at 06:06

## 2017-01-25 RX ADMIN — VALSARTAN 40 MILLIGRAM(S): 80 TABLET ORAL at 06:12

## 2017-01-25 RX ADMIN — Medication 50 MILLIGRAM(S): at 12:39

## 2017-01-25 RX ADMIN — Medication 80 MILLIGRAM(S): at 06:00

## 2017-01-25 RX ADMIN — Medication 1 MILLIGRAM(S): at 12:39

## 2017-01-25 RX ADMIN — Medication 80 MILLIGRAM(S): at 18:27

## 2017-01-25 RX ADMIN — RIVAROXABAN 20 MILLIGRAM(S): KIT at 12:39

## 2017-01-25 RX ADMIN — CARVEDILOL PHOSPHATE 25 MILLIGRAM(S): 80 CAPSULE, EXTENDED RELEASE ORAL at 17:42

## 2017-01-25 RX ADMIN — Medication 3 MILLILITER(S): at 11:01

## 2017-01-25 RX ADMIN — VALSARTAN 40 MILLIGRAM(S): 80 TABLET ORAL at 17:42

## 2017-01-26 ENCOUNTER — APPOINTMENT (OUTPATIENT)
Dept: ORTHOPEDIC SURGERY | Facility: CLINIC | Age: 67
End: 2017-01-26

## 2017-01-26 LAB
BUN SERPL-MCNC: 33 MG/DL — HIGH (ref 7–23)
CALCIUM SERPL-MCNC: 9.2 MG/DL — SIGNIFICANT CHANGE UP (ref 8.4–10.5)
CHLORIDE SERPL-SCNC: 99 MMOL/L — SIGNIFICANT CHANGE UP (ref 98–107)
CO2 SERPL-SCNC: 22 MMOL/L — SIGNIFICANT CHANGE UP (ref 22–31)
CREAT SERPL-MCNC: 1.11 MG/DL — SIGNIFICANT CHANGE UP (ref 0.5–1.3)
GLUCOSE SERPL-MCNC: 95 MG/DL — SIGNIFICANT CHANGE UP (ref 70–99)
HCT VFR BLD CALC: 37.1 % — LOW (ref 39–50)
HGB BLD-MCNC: 11.8 G/DL — LOW (ref 13–17)
MAGNESIUM SERPL-MCNC: 2.3 MG/DL — SIGNIFICANT CHANGE UP (ref 1.6–2.6)
MCHC RBC-ENTMCNC: 31.3 PG — SIGNIFICANT CHANGE UP (ref 27–34)
MCHC RBC-ENTMCNC: 31.8 % — LOW (ref 32–36)
MCV RBC AUTO: 98.4 FL — SIGNIFICANT CHANGE UP (ref 80–100)
NT-PROBNP SERPL-SCNC: 2618 PG/ML — SIGNIFICANT CHANGE UP
PLATELET # BLD AUTO: 228 K/UL — SIGNIFICANT CHANGE UP (ref 150–400)
PMV BLD: 11.3 FL — SIGNIFICANT CHANGE UP (ref 7–13)
POTASSIUM SERPL-MCNC: 3.9 MMOL/L — SIGNIFICANT CHANGE UP (ref 3.5–5.3)
POTASSIUM SERPL-SCNC: 3.9 MMOL/L — SIGNIFICANT CHANGE UP (ref 3.5–5.3)
RBC # BLD: 3.77 M/UL — LOW (ref 4.2–5.8)
RBC # FLD: 15.9 % — HIGH (ref 10.3–14.5)
SODIUM SERPL-SCNC: 140 MMOL/L — SIGNIFICANT CHANGE UP (ref 135–145)
WBC # BLD: 7.46 K/UL — SIGNIFICANT CHANGE UP (ref 3.8–10.5)
WBC # FLD AUTO: 7.46 K/UL — SIGNIFICANT CHANGE UP (ref 3.8–10.5)

## 2017-01-26 PROCEDURE — 99233 SBSQ HOSP IP/OBS HIGH 50: CPT

## 2017-01-26 RX ORDER — CARVEDILOL PHOSPHATE 80 MG/1
25 CAPSULE, EXTENDED RELEASE ORAL EVERY 12 HOURS
Qty: 0 | Refills: 0 | Status: DISCONTINUED | OUTPATIENT
Start: 2017-01-26 | End: 2017-01-30

## 2017-01-26 RX ORDER — POTASSIUM CHLORIDE 20 MEQ
20 PACKET (EA) ORAL ONCE
Qty: 0 | Refills: 0 | Status: COMPLETED | OUTPATIENT
Start: 2017-01-26 | End: 2017-01-26

## 2017-01-26 RX ORDER — VALSARTAN 80 MG/1
80 TABLET ORAL
Qty: 0 | Refills: 0 | Status: DISCONTINUED | OUTPATIENT
Start: 2017-01-26 | End: 2017-01-27

## 2017-01-26 RX ORDER — METOPROLOL TARTRATE 50 MG
50 TABLET ORAL DAILY
Qty: 0 | Refills: 0 | Status: DISCONTINUED | OUTPATIENT
Start: 2017-01-26 | End: 2017-01-26

## 2017-01-26 RX ADMIN — Medication 80 MILLIGRAM(S): at 18:09

## 2017-01-26 RX ADMIN — VALSARTAN 80 MILLIGRAM(S): 80 TABLET ORAL at 18:09

## 2017-01-26 RX ADMIN — Medication 20 MILLIEQUIVALENT(S): at 18:11

## 2017-01-26 RX ADMIN — Medication 1 MILLIGRAM(S): at 12:26

## 2017-01-26 RX ADMIN — RIVAROXABAN 20 MILLIGRAM(S): KIT at 12:26

## 2017-01-26 RX ADMIN — CARVEDILOL PHOSPHATE 25 MILLIGRAM(S): 80 CAPSULE, EXTENDED RELEASE ORAL at 05:45

## 2017-01-26 RX ADMIN — Medication 80 MILLIGRAM(S): at 05:45

## 2017-01-26 RX ADMIN — Medication 50 MILLIGRAM(S): at 12:26

## 2017-01-26 RX ADMIN — CARVEDILOL PHOSPHATE 25 MILLIGRAM(S): 80 CAPSULE, EXTENDED RELEASE ORAL at 18:09

## 2017-01-26 NOTE — DIETITIAN INITIAL EVALUATION ADULT. - NS FNS REASON FOR WEIGHT CHANG
fluid retention/Increased energy intake, patient eating out more frequently, fluids retention./other (specify)

## 2017-01-26 NOTE — DIETITIAN INITIAL EVALUATION ADULT. - OTHER INFO
Nutrition consult received for RD-heart failure linked order set. Patient reports good PO intake at present and prior to admission. Patient denies any nausea/vomiting/diarrhea/constipation or difficulty chewing and swallowing. Pt. denies the use or Multivitamins/supplements prior to admission. Also, Pt denies any food allergies.  Pt  was not able to verbalize recommended diet modifications. The Pt was provided with Heart Healthy diet education (low sodium, low cholesterol, "good fats" vs "bad fats," fiber, label reading, meal planning, and daily weight monitoring.)  Pt. educated on how to eat healthier while eating out. Also, Pt. also encouraged to eat more foods at home.

## 2017-01-26 NOTE — DIETITIAN INITIAL EVALUATION ADULT. - NS AS NUTRI INTERV ED CONTENT
Nutrition relationship to health/disease/Priority modifications/Survival information/Recommended modifications/Purpose of the nutrition education

## 2017-01-27 ENCOUNTER — APPOINTMENT (OUTPATIENT)
Dept: ELECTROPHYSIOLOGY | Facility: CLINIC | Age: 67
End: 2017-01-27

## 2017-01-27 LAB
BUN SERPL-MCNC: 34 MG/DL — HIGH (ref 7–23)
CALCIUM SERPL-MCNC: 9.4 MG/DL — SIGNIFICANT CHANGE UP (ref 8.4–10.5)
CHLORIDE SERPL-SCNC: 98 MMOL/L — SIGNIFICANT CHANGE UP (ref 98–107)
CO2 SERPL-SCNC: 25 MMOL/L — SIGNIFICANT CHANGE UP (ref 22–31)
CREAT SERPL-MCNC: 1.22 MG/DL — SIGNIFICANT CHANGE UP (ref 0.5–1.3)
GLUCOSE SERPL-MCNC: 91 MG/DL — SIGNIFICANT CHANGE UP (ref 70–99)
HCT VFR BLD CALC: 38 % — LOW (ref 39–50)
HGB BLD-MCNC: 12.7 G/DL — LOW (ref 13–17)
MAGNESIUM SERPL-MCNC: 2.3 MG/DL — SIGNIFICANT CHANGE UP (ref 1.6–2.6)
MCHC RBC-ENTMCNC: 32.2 PG — SIGNIFICANT CHANGE UP (ref 27–34)
MCHC RBC-ENTMCNC: 33.4 % — SIGNIFICANT CHANGE UP (ref 32–36)
MCV RBC AUTO: 96.4 FL — SIGNIFICANT CHANGE UP (ref 80–100)
PLATELET # BLD AUTO: 239 K/UL — SIGNIFICANT CHANGE UP (ref 150–400)
PMV BLD: 11 FL — SIGNIFICANT CHANGE UP (ref 7–13)
POTASSIUM SERPL-MCNC: 4 MMOL/L — SIGNIFICANT CHANGE UP (ref 3.5–5.3)
POTASSIUM SERPL-SCNC: 4 MMOL/L — SIGNIFICANT CHANGE UP (ref 3.5–5.3)
RBC # BLD: 3.94 M/UL — LOW (ref 4.2–5.8)
RBC # FLD: 15.6 % — HIGH (ref 10.3–14.5)
SODIUM SERPL-SCNC: 140 MMOL/L — SIGNIFICANT CHANGE UP (ref 135–145)
WBC # BLD: 6.95 K/UL — SIGNIFICANT CHANGE UP (ref 3.8–10.5)
WBC # FLD AUTO: 6.95 K/UL — SIGNIFICANT CHANGE UP (ref 3.8–10.5)

## 2017-01-27 PROCEDURE — 99233 SBSQ HOSP IP/OBS HIGH 50: CPT

## 2017-01-27 PROCEDURE — 99232 SBSQ HOSP IP/OBS MODERATE 35: CPT

## 2017-01-27 RX ORDER — VALSARTAN 80 MG/1
40 TABLET ORAL
Qty: 0 | Refills: 0 | Status: DISCONTINUED | OUTPATIENT
Start: 2017-01-27 | End: 2017-01-30

## 2017-01-27 RX ORDER — FUROSEMIDE 40 MG
80 TABLET ORAL
Qty: 0 | Refills: 0 | Status: DISCONTINUED | OUTPATIENT
Start: 2017-01-27 | End: 2017-01-30

## 2017-01-27 RX ADMIN — VALSARTAN 80 MILLIGRAM(S): 80 TABLET ORAL at 15:08

## 2017-01-27 RX ADMIN — VALSARTAN 40 MILLIGRAM(S): 80 TABLET ORAL at 22:03

## 2017-01-27 RX ADMIN — CARVEDILOL PHOSPHATE 25 MILLIGRAM(S): 80 CAPSULE, EXTENDED RELEASE ORAL at 22:02

## 2017-01-27 RX ADMIN — CARVEDILOL PHOSPHATE 25 MILLIGRAM(S): 80 CAPSULE, EXTENDED RELEASE ORAL at 05:38

## 2017-01-27 RX ADMIN — RIVAROXABAN 20 MILLIGRAM(S): KIT at 12:58

## 2017-01-27 RX ADMIN — Medication 80 MILLIGRAM(S): at 05:38

## 2017-01-27 RX ADMIN — Medication 1 MILLIGRAM(S): at 12:58

## 2017-01-27 RX ADMIN — Medication 80 MILLIGRAM(S): at 22:03

## 2017-01-28 LAB
BUN SERPL-MCNC: 45 MG/DL — HIGH (ref 7–23)
CALCIUM SERPL-MCNC: 9.3 MG/DL — SIGNIFICANT CHANGE UP (ref 8.4–10.5)
CHLORIDE SERPL-SCNC: 95 MMOL/L — LOW (ref 98–107)
CO2 SERPL-SCNC: 25 MMOL/L — SIGNIFICANT CHANGE UP (ref 22–31)
CREAT SERPL-MCNC: 1.3 MG/DL — SIGNIFICANT CHANGE UP (ref 0.5–1.3)
GLUCOSE SERPL-MCNC: 97 MG/DL — SIGNIFICANT CHANGE UP (ref 70–99)
HCT VFR BLD CALC: 40 % — SIGNIFICANT CHANGE UP (ref 39–50)
HGB BLD-MCNC: 13 G/DL — SIGNIFICANT CHANGE UP (ref 13–17)
MAGNESIUM SERPL-MCNC: 2.3 MG/DL — SIGNIFICANT CHANGE UP (ref 1.6–2.6)
MCHC RBC-ENTMCNC: 31.6 PG — SIGNIFICANT CHANGE UP (ref 27–34)
MCHC RBC-ENTMCNC: 32.5 % — SIGNIFICANT CHANGE UP (ref 32–36)
MCV RBC AUTO: 97.3 FL — SIGNIFICANT CHANGE UP (ref 80–100)
PLATELET # BLD AUTO: 252 K/UL — SIGNIFICANT CHANGE UP (ref 150–400)
PMV BLD: 11.2 FL — SIGNIFICANT CHANGE UP (ref 7–13)
POTASSIUM SERPL-MCNC: 4.1 MMOL/L — SIGNIFICANT CHANGE UP (ref 3.5–5.3)
POTASSIUM SERPL-SCNC: 4.1 MMOL/L — SIGNIFICANT CHANGE UP (ref 3.5–5.3)
RBC # BLD: 4.11 M/UL — LOW (ref 4.2–5.8)
RBC # FLD: 15.9 % — HIGH (ref 10.3–14.5)
SODIUM SERPL-SCNC: 138 MMOL/L — SIGNIFICANT CHANGE UP (ref 135–145)
URATE SERPL-MCNC: 13.6 MG/DL — HIGH (ref 3.4–8.8)
WBC # BLD: 8.67 K/UL — SIGNIFICANT CHANGE UP (ref 3.8–10.5)
WBC # FLD AUTO: 8.67 K/UL — SIGNIFICANT CHANGE UP (ref 3.8–10.5)

## 2017-01-28 PROCEDURE — 73620 X-RAY EXAM OF FOOT: CPT | Mod: 26,RT

## 2017-01-28 PROCEDURE — 73600 X-RAY EXAM OF ANKLE: CPT | Mod: 26,RT

## 2017-01-28 RX ORDER — ACETAMINOPHEN 500 MG
650 TABLET ORAL EVERY 6 HOURS
Qty: 0 | Refills: 0 | Status: DISCONTINUED | OUTPATIENT
Start: 2017-01-28 | End: 2017-01-30

## 2017-01-28 RX ADMIN — Medication 80 MILLIGRAM(S): at 17:20

## 2017-01-28 RX ADMIN — Medication 80 MILLIGRAM(S): at 10:07

## 2017-01-28 RX ADMIN — Medication 1 MILLIGRAM(S): at 12:04

## 2017-01-28 RX ADMIN — RIVAROXABAN 20 MILLIGRAM(S): KIT at 12:04

## 2017-01-28 RX ADMIN — Medication 650 MILLIGRAM(S): at 21:50

## 2017-01-28 RX ADMIN — Medication 650 MILLIGRAM(S): at 22:15

## 2017-01-28 RX ADMIN — VALSARTAN 40 MILLIGRAM(S): 80 TABLET ORAL at 17:20

## 2017-01-28 RX ADMIN — Medication 650 MILLIGRAM(S): at 01:26

## 2017-01-28 RX ADMIN — VALSARTAN 40 MILLIGRAM(S): 80 TABLET ORAL at 10:07

## 2017-01-28 RX ADMIN — CARVEDILOL PHOSPHATE 25 MILLIGRAM(S): 80 CAPSULE, EXTENDED RELEASE ORAL at 10:07

## 2017-01-28 RX ADMIN — CARVEDILOL PHOSPHATE 25 MILLIGRAM(S): 80 CAPSULE, EXTENDED RELEASE ORAL at 17:20

## 2017-01-28 RX ADMIN — Medication 650 MILLIGRAM(S): at 02:15

## 2017-01-29 ENCOUNTER — TRANSCRIPTION ENCOUNTER (OUTPATIENT)
Age: 67
End: 2017-01-29

## 2017-01-29 LAB
BASOPHILS # BLD AUTO: 0.01 K/UL — SIGNIFICANT CHANGE UP (ref 0–0.2)
BASOPHILS NFR BLD AUTO: 0.2 % — SIGNIFICANT CHANGE UP (ref 0–2)
BUN SERPL-MCNC: 42 MG/DL — HIGH (ref 7–23)
CALCIUM SERPL-MCNC: 9.5 MG/DL — SIGNIFICANT CHANGE UP (ref 8.4–10.5)
CHLORIDE SERPL-SCNC: 96 MMOL/L — LOW (ref 98–107)
CO2 SERPL-SCNC: 26 MMOL/L — SIGNIFICANT CHANGE UP (ref 22–31)
CREAT SERPL-MCNC: 1.18 MG/DL — SIGNIFICANT CHANGE UP (ref 0.5–1.3)
EOSINOPHIL # BLD AUTO: 0.24 K/UL — SIGNIFICANT CHANGE UP (ref 0–0.5)
EOSINOPHIL NFR BLD AUTO: 3.9 % — SIGNIFICANT CHANGE UP (ref 0–6)
GLUCOSE SERPL-MCNC: 134 MG/DL — HIGH (ref 70–99)
HCT VFR BLD CALC: 40 % — SIGNIFICANT CHANGE UP (ref 39–50)
HGB BLD-MCNC: 13 G/DL — SIGNIFICANT CHANGE UP (ref 13–17)
IMM GRANULOCYTES NFR BLD AUTO: 0 % — SIGNIFICANT CHANGE UP (ref 0–1.5)
LYMPHOCYTES # BLD AUTO: 1.52 K/UL — SIGNIFICANT CHANGE UP (ref 1–3.3)
LYMPHOCYTES # BLD AUTO: 24.6 % — SIGNIFICANT CHANGE UP (ref 13–44)
MAGNESIUM SERPL-MCNC: 2.5 MG/DL — SIGNIFICANT CHANGE UP (ref 1.6–2.6)
MCHC RBC-ENTMCNC: 31.3 PG — SIGNIFICANT CHANGE UP (ref 27–34)
MCHC RBC-ENTMCNC: 32.5 % — SIGNIFICANT CHANGE UP (ref 32–36)
MCV RBC AUTO: 96.4 FL — SIGNIFICANT CHANGE UP (ref 80–100)
MONOCYTES # BLD AUTO: 1.18 K/UL — HIGH (ref 0–0.9)
MONOCYTES NFR BLD AUTO: 19.1 % — HIGH (ref 2–14)
NEUTROPHILS # BLD AUTO: 3.23 K/UL — SIGNIFICANT CHANGE UP (ref 1.8–7.4)
NEUTROPHILS NFR BLD AUTO: 52.2 % — SIGNIFICANT CHANGE UP (ref 43–77)
PLATELET # BLD AUTO: 251 K/UL — SIGNIFICANT CHANGE UP (ref 150–400)
PMV BLD: 10.5 FL — SIGNIFICANT CHANGE UP (ref 7–13)
POTASSIUM SERPL-MCNC: 3.6 MMOL/L — SIGNIFICANT CHANGE UP (ref 3.5–5.3)
POTASSIUM SERPL-SCNC: 3.6 MMOL/L — SIGNIFICANT CHANGE UP (ref 3.5–5.3)
RBC # BLD: 4.15 M/UL — LOW (ref 4.2–5.8)
RBC # FLD: 15.6 % — HIGH (ref 10.3–14.5)
SODIUM SERPL-SCNC: 139 MMOL/L — SIGNIFICANT CHANGE UP (ref 135–145)
WBC # BLD: 6.18 K/UL — SIGNIFICANT CHANGE UP (ref 3.8–10.5)
WBC # FLD AUTO: 6.18 K/UL — SIGNIFICANT CHANGE UP (ref 3.8–10.5)

## 2017-01-29 RX ADMIN — Medication 80 MILLIGRAM(S): at 17:59

## 2017-01-29 RX ADMIN — CARVEDILOL PHOSPHATE 25 MILLIGRAM(S): 80 CAPSULE, EXTENDED RELEASE ORAL at 05:02

## 2017-01-29 RX ADMIN — RIVAROXABAN 20 MILLIGRAM(S): KIT at 12:10

## 2017-01-29 RX ADMIN — CARVEDILOL PHOSPHATE 25 MILLIGRAM(S): 80 CAPSULE, EXTENDED RELEASE ORAL at 19:34

## 2017-01-29 RX ADMIN — Medication 80 MILLIGRAM(S): at 05:02

## 2017-01-29 RX ADMIN — VALSARTAN 40 MILLIGRAM(S): 80 TABLET ORAL at 05:02

## 2017-01-29 RX ADMIN — Medication 1 MILLIGRAM(S): at 12:10

## 2017-01-29 RX ADMIN — VALSARTAN 40 MILLIGRAM(S): 80 TABLET ORAL at 17:59

## 2017-01-29 NOTE — DISCHARGE NOTE ADULT - CARE PROVIDER_API CALL
Ricky Kessler (MD), Cardiac Electrophysiology; Cardiovascular Disease; Internal Medicine  [address] [address] [place] [id number]  Phone: [phone]  Fax: [phone] Ricky Kessler), Cardiac Electrophysiology; Cardiovascular Disease; Internal Medicine  [address] [address] [place] [address]  Phone: [phone]  Fax: [phone]    Júnior Hurtado), Adv Heart Fail [facility] Cardio  [id number] [place] [address]  Phone: [phone]  Fax: [phone]

## 2017-01-29 NOTE — DISCHARGE NOTE ADULT - CARE PLAN
Principal Discharge DX:	Acute on chronic systolic (congestive) heart failure  Goal:	REmain Euvolemic  Instructions for follow-up, activity and diet:	Follow up with Your cardiologist in 1-2 weeks  Secondary Diagnosis:	HTN (hypertension)  Secondary Diagnosis:	PAF (paroxysmal atrial fibrillation)  Secondary Diagnosis:	AICD (automatic cardioverter/defibrillator) present Principal Discharge DX:	Acute on chronic systolic (congestive) heart failure  Goal:	REmain Euvolemic  Instructions for follow-up, activity and diet:	Follow up with Your cardiologist in 1-2 weeks  Secondary Diagnosis:	HTN (hypertension)  Goal:	Continue with your meds. Follow up with your PMD in 1 week.  Instructions for follow-up, activity and diet:	Continue with your meds. Follow up with your PMD in 1 week.  Secondary Diagnosis:	PAF (paroxysmal atrial fibrillation)  Goal:	Continue with your meds. Follow up with your PMD in 1 week.  Instructions for follow-up, activity and diet:	Continue with your meds. Follow up with your PMD in 1 week.  Secondary Diagnosis:	AICD (automatic cardioverter/defibrillator) present Principal Discharge DX:	Acute on chronic systolic (congestive) heart failure  Goal:	Prevent fluid overload. Maintain euvolemia. Ensure compliance with medications.  Instructions for follow-up, activity and diet:	Follow up with cardiologist within one week of discharge. Call for appointment. Return to ED for any concerning symptoms. Continue medications as prescribed. Low salt diet with fluid restriction.  Secondary Diagnosis:	HTN (hypertension)  Goal:	Maintain adequate control of your blood pressure. Goal BP < 130/80. Continue low sodium diet.  Instructions for follow-up, activity and diet:	Follow up with PCP and/or cardiologist for ongoing medical management of your hypertension. Continue medications as prescribed. Low salt diet.  Secondary Diagnosis:	PAF (paroxysmal atrial fibrillation)  Goal:	Maintain HR between 60bpm-100bpm.  Instructions for follow-up, activity and diet:	Follow up with cardiologist within one week of discharge. Call for appointment. Continue medications as instructed.  Secondary Diagnosis:	AICD (automatic cardioverter/defibrillator) present  Goal:	Maintain function  Instructions for follow-up, activity and diet:	Please follow up with your Cardiologist for further management and monitoring. Principal Discharge DX:	Acute on chronic systolic (congestive) heart failure  Goal:	Prevent fluid overload. Maintain euvolemia. Ensure compliance with medications.  Instructions for follow-up, activity and diet:	Follow up with cardiologist within one week of discharge. Call for appointment. Return to ED for any concerning symptoms. Continue medications as prescribed. Low salt diet with fluid restriction.  Secondary Diagnosis:	HTN (hypertension)  Goal:	Maintain adequate control of your blood pressure. Goal BP < 130/80. Continue low sodium diet.  Instructions for follow-up, activity and diet:	Follow up with PCP and/or cardiologist for ongoing medical management of your hypertension. Continue medications as prescribed. Low salt diet.  Secondary Diagnosis:	PAF (paroxysmal atrial fibrillation)  Goal:	Maintain HR between 60bpm-100bpm.  Instructions for follow-up, activity and diet:	Follow up with cardiologist within one week of discharge. Call for appointment. Continue medications as instructed.  Secondary Diagnosis:	AICD (automatic cardioverter/defibrillator) present  Goal:	Maintain function  Instructions for follow-up, activity and diet:	Please follow up with your Cardiologist for further management and monitoring.  Please follow up with Dr. Kessler on April 4th at 8:45am. Principal Discharge DX:	Acute on chronic systolic (congestive) heart failure  Goal:	Prevent fluid overload. Maintain euvolemia. Ensure compliance with medications.  Instructions for follow-up, activity and diet:	Follow up with Dr. Hurtado within one week of discharge. Call for appointment. Return to ED for any concerning symptoms. Continue medications as prescribed. Low salt diet with fluid restriction.  Secondary Diagnosis:	HTN (hypertension)  Goal:	Maintain adequate control of your blood pressure. Goal BP < 130/80. Continue low sodium diet.  Instructions for follow-up, activity and diet:	Follow up with PCP and/or cardiologist for ongoing medical management of your hypertension. Continue medications as prescribed. Low salt diet.  Secondary Diagnosis:	PAF (paroxysmal atrial fibrillation)  Goal:	Maintain HR between 60bpm-100bpm.  Instructions for follow-up, activity and diet:	Follow up with cardiologist within one week of discharge. Call for appointment. Continue medications as instructed.  Secondary Diagnosis:	AICD (automatic cardioverter/defibrillator) present  Goal:	Maintain function  Instructions for follow-up, activity and diet:	Please follow up with your Cardiologist for further management and monitoring.  Please follow up with Dr. Kessler on April 4th at 8:45am. Principal Discharge DX:	Acute on chronic systolic (congestive) heart failure  Goal:	Prevent fluid overload. Maintain euvolemia. Ensure compliance with medications.  Instructions for follow-up, activity and diet:	Follow up with Dr. Hurtado on Feb. 6th at 12:30. Return to ED for any concerning symptoms. Continue medications as prescribed. Low salt diet with fluid restriction.  Secondary Diagnosis:	HTN (hypertension)  Goal:	Maintain adequate control of your blood pressure. Goal BP < 130/80. Continue low sodium diet.  Instructions for follow-up, activity and diet:	Follow up with PCP and/or cardiologist for ongoing medical management of your hypertension. Continue medications as prescribed. Low salt diet.  Secondary Diagnosis:	PAF (paroxysmal atrial fibrillation)  Goal:	Maintain HR between 60bpm-100bpm.  Instructions for follow-up, activity and diet:	Follow up with cardiologist within one week of discharge. Call for appointment. Continue medications as instructed.  Secondary Diagnosis:	AICD (automatic cardioverter/defibrillator) present  Goal:	Maintain function  Instructions for follow-up, activity and diet:	Please follow up with your Cardiologist for further management and monitoring.  Please follow up with Dr. Kessler on April 4th at 8:45am.

## 2017-01-29 NOTE — DISCHARGE NOTE ADULT - PLAN OF CARE
REmain Euvolemic Follow up with Your cardiologist in 1-2 weeks Continue with your meds. Follow up with your PMD in 1 week. Maintain function Please follow up with your Cardiologist for further management and monitoring. Prevent fluid overload. Maintain euvolemia. Ensure compliance with medications. Follow up with cardiologist within one week of discharge. Call for appointment. Return to ED for any concerning symptoms. Continue medications as prescribed. Low salt diet with fluid restriction. Maintain adequate control of your blood pressure. Goal BP < 130/80. Continue low sodium diet. Follow up with PCP and/or cardiologist for ongoing medical management of your hypertension. Continue medications as prescribed. Low salt diet. Maintain HR between 60bpm-100bpm. Follow up with cardiologist within one week of discharge. Call for appointment. Continue medications as instructed. Please follow up with your Cardiologist for further management and monitoring.  Please follow up with Dr. Kessler on April 4th at 8:45am. Follow up with Dr. Hurtado within one week of discharge. Call for appointment. Return to ED for any concerning symptoms. Continue medications as prescribed. Low salt diet with fluid restriction. Follow up with Dr. Hurtado on Feb. 6th at 12:30. Return to ED for any concerning symptoms. Continue medications as prescribed. Low salt diet with fluid restriction.

## 2017-01-29 NOTE — DISCHARGE NOTE ADULT - ADDITIONAL INSTRUCTIONS
Please follow up with Dr. Kessler on April 4th at 8:45am. Please follow up with Dr. Kessler on April 4th at 8:45am.  Please follow up with Dr. Hurtado within 1 week. Please call for appointment. Please follow up with Dr. Kessler on April 4th at 8:45am.  Please follow up with Dr. Hurtado on Feb 6th at 12:30.  Please return to the ED for any concerning symptoms.

## 2017-01-29 NOTE — DISCHARGE NOTE ADULT - MEDICATION SUMMARY - MEDICATIONS TO STOP TAKING
I will STOP taking the medications listed below when I get home from the hospital:    enalapril 2.5 mg oral tablet  -- 1 tab(s) by mouth once a day

## 2017-01-29 NOTE — DISCHARGE NOTE ADULT - MEDICATION SUMMARY - MEDICATIONS TO CHANGE
I will SWITCH the dose or number of times a day I take the medications listed below when I get home from the hospital:    furosemide 40 mg oral tablet  -- 1 tab(s) by mouth 2 times a day

## 2017-01-29 NOTE — DISCHARGE NOTE ADULT - CARE PROVIDERS DIRECT ADDRESSES
,[email or web address]_Unknown [email or web address],DirectAddress_Unknown

## 2017-01-29 NOTE — DISCHARGE NOTE ADULT - PATIENT PORTAL LINK FT
“You can access the [facility] Patient Portal, offered by [facility], by registering with the following website: [email or web address]”

## 2017-01-29 NOTE — DISCHARGE NOTE ADULT - SECONDARY DIAGNOSIS.
HTN (hypertension) PAF (paroxysmal atrial fibrillation) AICD (automatic cardioverter/defibrillator) present

## 2017-01-29 NOTE — DISCHARGE NOTE ADULT - HOSPITAL COURSE
67 y/o M with PMH of PAF(s/p ablation and currently on Xarelto), NICM, VFIB/SVT(s/p AICD) presented with the complaint of SOB x 1 day. As per the patient he has normally baseline SOB but for the past 1 day he developed worsening SOB. Patient stated that even walking 1 block he would have to stop multiple times to catch his breath. Patient stated that he has been taking his heart medications(especially Lasix) but was told recently that "he is taking his medications wrong". Patient also stated that he limits his salt intake with his food. Patient stated that when he rest the SOB resolves and when he even ambulates a little he would feel SOB but is worse with climbing a flight of stairs. Patient stated that he normally sleeps with 1 pillow at night which is chronic for him, and denied any PND or orthopnea. Patient denied any CP, fevers, chills, melena, hematochezia, recent travel, sick contact, dysuria, abdominal pain, cough, bilateral LE swelling, pleuritic or positional chest pain.     Hospital Course:   Patient was followed by Heart failure and EP staff. Patient underwent IV diuresis and  had adjustments for her BP meds for having episodes of NSVT.     Incomplete. 65 y/o M with PMH of PAF(s/p ablation and currently on Xarelto), NICM, VFIB/SVT(s/p AICD) presented with the complaint of SOB x 1 day. As per the patient he has normally baseline SOB but for the past 1 day he developed worsening SOB. Patient stated that even walking 1 block he would have to stop multiple times to catch his breath. Patient stated that he has been taking his heart medications(especially Lasix) but was told recently that "he is taking his medications wrong". Patient also stated that he limits his salt intake with his food. Patient stated that when he rest the SOB resolves and when he even ambulates a little he would feel SOB but is worse with climbing a flight of stairs. Patient stated that he normally sleeps with 1 pillow at night which is chronic for him, and denied any PND or orthopnea. Patient denied any CP, fevers, chills, melena, hematochezia, recent travel, sick contact, dysuria, abdominal pain, cough, bilateral LE swelling, pleuritic or positional chest pain.     In ED EKG showed Afib at a rate of 89 with Qtc of 493 and ST depression in lead V1-V6. CE x 2: Negative. H&H: 12.2/38.7. BNP: 5900  BUN/Cr: 36/1.40. UA: Negative   Chest X-ray showed mild pulmonary edema.    Cardiology evaluation of patient recommended to admit to telemetry, Start Lasix 40mg IVP BID, continue with all other medications, strict I&O, EP consult for ICD interrogation.     EP interrogation of device determined 4 episodes of NSVT lasted 4-6 seconds at 240-269 bpm since 1/13/17, PAF, device not subject to advisory - excellent threshold capture, no reprogramming.  EP recommended to contiunue IV diuresis/BB, keep K>4, Mg > 1.8. Daily CBC and BMPs were monitored on the patient.   Heart Failure consult on patient changed Enalapril to Valsartan, and increased Lasix to 80mg IV BID.  During his hospital stay, the patient experienced episodes of NSVT, for which his BB therapy was adjusted.   The patient was diuresed until determined to be Euvolemic. 67 y/o M with PMH of PAF(s/p ablation and currently on Xarelto), NICM, VFIB/SVT(s/p AICD) presented with the complaint of SOB x 1 day. As per the patient he has normally baseline SOB but for the past 1 day he developed worsening SOB. Patient stated that even walking 1 block he would have to stop multiple times to catch his breath. Patient stated that he has been taking his heart medications(especially Lasix) but was told recently that "he is taking his medications wrong". Patient also stated that he limits his salt intake with his food. Patient stated that when he rest the SOB resolves and when he even ambulates a little he would feel SOB but is worse with climbing a flight of stairs. Patient stated that he normally sleeps with 1 pillow at night which is chronic for him, and denied any PND or orthopnea. Patient denied any CP, fevers, chills, melena, hematochezia, recent travel, sick contact, dysuria, abdominal pain, cough, bilateral LE swelling, pleuritic or positional chest pain.     In ED EKG showed Afib at a rate of 89 with Qtc of 493 and ST depression in lead V1-V6. CE x 2: Negative. H&H: 12.2/38.7. BNP: 5900  BUN/Cr: 36/1.40. UA: Negative   Chest X-ray showed mild pulmonary edema.    Cardiology evaluation of patient recommended to admit to telemetry, Start Lasix 40mg IVP BID, continue with all other medications, strict I&O, EP consult for ICD interrogation.   His Lasix was increased to 80mg PO BID upon discharge.     EP interrogation of device determined 4 episodes of NSVT lasted 4-6 seconds at 240-269 bpm since 1/13/17, PAF, device not subject to advisory - excellent threshold capture, no reprogramming.  EP recommended to contiunue IV diuresis/BB, keep K>4, Mg > 1.8. Daily CBC and BMPs were monitored on the patient.   Heart Failure consult on patient changed Enalapril to Valsartan, and increased Lasix to 80mg IV BID.  During his hospital stay, the patient experienced episodes of NSVT, for which his BB therapy was adjusted.   The patient was diuresed until determined to be Euvolemic.     The patient has been seen and evaluated and has been cleared for discharge at this time by the Heart Failure team and EP. The patient will follow up with Dr. Kessler on April 4th at 8:45am. 65 y/o M with PMH of PAF(s/p ablation and currently on Xarelto), NICM, VFIB/SVT(s/p AICD) presented with the complaint of SOB x 1 day. As per the patient he has normally baseline SOB but for the past 1 day he developed worsening SOB. Patient stated that even walking 1 block he would have to stop multiple times to catch his breath. Patient stated that he has been taking his heart medications(especially Lasix) but was told recently that "he is taking his medications wrong". Patient also stated that he limits his salt intake with his food. Patient stated that when he rest the SOB resolves and when he even ambulates a little he would feel SOB but is worse with climbing a flight of stairs. Patient stated that he normally sleeps with 1 pillow at night which is chronic for him, and denied any PND or orthopnea. Patient denied any CP, fevers, chills, melena, hematochezia, recent travel, sick contact, dysuria, abdominal pain, cough, bilateral LE swelling, pleuritic or positional chest pain.     In ED EKG showed Afib at a rate of 89 with Qtc of 493 and ST depression in lead V1-V6. CE x 2: Negative. H&H: 12.2/38.7. BNP: 5900  BUN/Cr: 36/1.40. UA: Negative   Chest X-ray showed mild pulmonary edema.    Cardiology evaluation of patient recommended to admit to telemetry, Start Lasix 40mg IVP BID, continue with all other medications, strict I&O, EP consult for ICD interrogation.   His Lasix was increased to 80mg PO BID upon discharge.     EP interrogation of device determined 4 episodes of NSVT lasted 4-6 seconds at 240-269 bpm since 1/13/17, PAF, device not subject to advisory - excellent threshold capture, no reprogramming.  EP recommended to contiunue IV diuresis/BB, keep K>4, Mg > 1.8. Daily CBC and BMPs were monitored on the patient.   Heart Failure consult on patient changed Enalapril to Valsartan, and increased Lasix to 80mg IV BID.  During his hospital stay, the patient experienced episodes of NSVT, for which his BB therapy was adjusted.   The patient was diuresed until determined to be Euvolemic.     The patient has been seen and evaluated and has been cleared for discharge at this time by the Heart Failure team and EP. The patient will follow up with Dr. Kessler on April 4th at 8:45am, and with Dr. Hurtado within 1 week. 67 y/o M with PMH of PAF(s/p ablation and currently on Xarelto), NICM, VFIB/SVT(s/p AICD) presented with the complaint of SOB x 1 day. As per the patient he has normally baseline SOB but for the past 1 day he developed worsening SOB. Patient stated that even walking 1 block he would have to stop multiple times to catch his breath. Patient stated that he has been taking his heart medications(especially Lasix) but was told recently that "he is taking his medications wrong". Patient also stated that he limits his salt intake with his food. Patient stated that when he rest the SOB resolves and when he even ambulates a little he would feel SOB but is worse with climbing a flight of stairs. Patient stated that he normally sleeps with 1 pillow at night which is chronic for him, and denied any PND or orthopnea. Patient denied any CP, fevers, chills, melena, hematochezia, recent travel, sick contact, dysuria, abdominal pain, cough, bilateral LE swelling, pleuritic or positional chest pain.     In ED EKG showed Afib at a rate of 89 with Qtc of 493 and ST depression in lead V1-V6. CE x 2: Negative. H&H: 12.2/38.7. BNP: 5900  BUN/Cr: 36/1.40. UA: Negative   Chest X-ray showed mild pulmonary edema.    Cardiology evaluation of patient recommended to admit to telemetry, Start Lasix 40mg IVP BID, continue with all other medications, strict I&O, EP consult for ICD interrogation.   His Lasix was increased to 80mg PO BID upon discharge.     EP interrogation of device determined 4 episodes of NSVT lasted 4-6 seconds at 240-269 bpm since 1/13/17, PAF, device not subject to advisory - excellent threshold capture, no reprogramming.  EP recommended to contiunue IV diuresis/BB, keep K>4, Mg > 1.8. Daily CBC and BMPs were monitored on the patient.   Heart Failure consult on patient changed Enalapril to Valsartan, and increased Lasix to 80mg IV BID.  During his hospital stay, the patient experienced episodes of NSVT, for which his BB therapy was adjusted.   The patient was diuresed until determined to be Euvolemic.     The patient has been seen and evaluated and has been cleared for discharge at this time by the Heart Failure team and EP. The patient will follow up with Dr. Kessler on April 4th at 8:45am, and with Dr. Hurtado Feb. 6th at 12:30. 65 y/o M with PMH of PAF(s/p ablation and currently on Xarelto), NICM, VFIB/SVT(s/p AICD) presented with the complaint of SOB x 1 day. As per the patient he has normally baseline SOB but for the past 1 day he developed worsening SOB. Patient stated that even walking 1 block he would have to stop multiple times to catch his breath. Patient stated that he has been taking his heart medications(especially Lasix) but was told recently that "he is taking his medications wrong". Patient also stated that he limits his salt intake with his food. Patient stated that when he rest the SOB resolves and when he even ambulates a little he would feel SOB but is worse with climbing a flight of stairs. Patient stated that he normally sleeps with 1 pillow at night which is chronic for him, and denied any PND or orthopnea. Patient denied any CP, fevers, chills, melena, hematochezia, recent travel, sick contact, dysuria, abdominal pain, cough, bilateral LE swelling, pleuritic or positional chest pain.     In ED EKG showed Afib at a rate of 89 with Qtc of 493 and ST depression in lead V1-V6. CE x 2: Negative. H&H: 12.2/38.7. BNP: 5900  BUN/Cr: 36/1.40. UA: Negative   Chest X-ray showed mild pulmonary edema.    Cardiology evaluation of patient recommended to admit to telemetry, Start Lasix 40mg IVP BID, continue with all other medications, strict I&O, EP consult for ICD interrogation.   His Lasix was increased to 80mg PO BID upon discharge.     EP interrogation of device determined 4 episodes of NSVT lasted 4-6 seconds at 240-269 bpm since 1/13/17, PAF, device not subject to advisory - excellent threshold capture, no reprogramming.  EP recommended to contiunue IV diuresis/BB, keep K>4, Mg > 1.8. Daily CBC and BMPs were monitored on the patient.   Heart Failure consult on patient changed Enalapril to Valsartan, and increased Lasix to 80mg IV BID.  During his hospital stay, the patient experienced episodes of NSVT, for which his BB therapy was adjusted.   The patient was diuresed until determined to be Euvolemic.     During his hospital stay the patient complained of foot pain.   Right foot x-ray showed: Generalized soft tissue swelling. No tracking soft tissue gas collections, radiopaque foreign bodies, or gross radiographic evidence for osteomyelitis. Chronic osseous fragmentation adjacent to medial malleolar margin. Otherwise, no dislocations or acute appearing fractures. Tarsometatarsal alignment maintained without evidence for a Lisfranc injury. Hallux valgus deformity with associated overlying bunion. Otherwise preserved visualized joint spaces and no joint margin erosions. Plantar calcaneal enthesophyte. Unremarkable distal Achilles tendon   shadow. Generalized osteopenia otherwise no discrete lytic or blastic lesions. Vascular calcifications.      The patient has been seen and evaluated and has been cleared for discharge at this time by the Heart Failure team and EP. The patient will follow up with Dr. Kessler on April 4th at 8:45am, and with Dr. Hurtado Feb. 6th at 12:30.

## 2017-01-29 NOTE — DISCHARGE NOTE ADULT - MEDICATION SUMMARY - MEDICATIONS TO TAKE
I will START or STAY ON the medications listed below when I get home from the hospital:    valsartan 40 mg oral tablet  -- 1 tab(s) by mouth 2 times a day  -- Indication: For HTN (hypertension)    Xarelto 20 mg oral tablet  -- 1 tab(s) by mouth once a day (in the evening)  -- Indication: For PAF (paroxysmal atrial fibrillation)    carvedilol 25 mg oral tablet  -- 1 tab(s) by mouth every 12 hours  -- Indication: For Acute on chronic systolic (congestive) heart failure    Lasix 80 mg oral tablet  -- 1 tab(s) by mouth once a day  -- Indication: For Acute on chronic systolic (congestive) heart failure    folic acid 1 mg oral tablet  -- 1 tab(s) by mouth once a day  -- Indication: For Supplement

## 2017-01-30 ENCOUNTER — APPOINTMENT (OUTPATIENT)
Dept: ELECTROPHYSIOLOGY | Facility: CLINIC | Age: 67
End: 2017-01-30

## 2017-01-30 VITALS
DIASTOLIC BLOOD PRESSURE: 67 MMHG | RESPIRATION RATE: 17 BRPM | SYSTOLIC BLOOD PRESSURE: 119 MMHG | OXYGEN SATURATION: 100 % | HEART RATE: 72 BPM

## 2017-01-30 LAB
BASOPHILS # BLD AUTO: 0.03 K/UL — SIGNIFICANT CHANGE UP (ref 0–0.2)
BASOPHILS NFR BLD AUTO: 0.5 % — SIGNIFICANT CHANGE UP (ref 0–2)
BUN SERPL-MCNC: 44 MG/DL — HIGH (ref 7–23)
CALCIUM SERPL-MCNC: 8.9 MG/DL — SIGNIFICANT CHANGE UP (ref 8.4–10.5)
CHLORIDE SERPL-SCNC: 95 MMOL/L — LOW (ref 98–107)
CO2 SERPL-SCNC: 26 MMOL/L — SIGNIFICANT CHANGE UP (ref 22–31)
CREAT SERPL-MCNC: 1.21 MG/DL — SIGNIFICANT CHANGE UP (ref 0.5–1.3)
EOSINOPHIL # BLD AUTO: 0.21 K/UL — SIGNIFICANT CHANGE UP (ref 0–0.5)
EOSINOPHIL NFR BLD AUTO: 3.7 % — SIGNIFICANT CHANGE UP (ref 0–6)
GLUCOSE SERPL-MCNC: 88 MG/DL — SIGNIFICANT CHANGE UP (ref 70–99)
HCT VFR BLD CALC: 39.6 % — SIGNIFICANT CHANGE UP (ref 39–50)
HGB BLD-MCNC: 12.8 G/DL — LOW (ref 13–17)
IMM GRANULOCYTES NFR BLD AUTO: 0.2 % — SIGNIFICANT CHANGE UP (ref 0–1.5)
LYMPHOCYTES # BLD AUTO: 2.09 K/UL — SIGNIFICANT CHANGE UP (ref 1–3.3)
LYMPHOCYTES # BLD AUTO: 36.3 % — SIGNIFICANT CHANGE UP (ref 13–44)
MAGNESIUM SERPL-MCNC: 2.6 MG/DL — SIGNIFICANT CHANGE UP (ref 1.6–2.6)
MCHC RBC-ENTMCNC: 31.4 PG — SIGNIFICANT CHANGE UP (ref 27–34)
MCHC RBC-ENTMCNC: 32.3 % — SIGNIFICANT CHANGE UP (ref 32–36)
MCV RBC AUTO: 97.1 FL — SIGNIFICANT CHANGE UP (ref 80–100)
MONOCYTES # BLD AUTO: 0.79 K/UL — SIGNIFICANT CHANGE UP (ref 0–0.9)
MONOCYTES NFR BLD AUTO: 13.7 % — SIGNIFICANT CHANGE UP (ref 2–14)
NEUTROPHILS # BLD AUTO: 2.62 K/UL — SIGNIFICANT CHANGE UP (ref 1.8–7.4)
NEUTROPHILS NFR BLD AUTO: 45.6 % — SIGNIFICANT CHANGE UP (ref 43–77)
PLATELET # BLD AUTO: 262 K/UL — SIGNIFICANT CHANGE UP (ref 150–400)
PMV BLD: 11 FL — SIGNIFICANT CHANGE UP (ref 7–13)
POTASSIUM SERPL-MCNC: 3.9 MMOL/L — SIGNIFICANT CHANGE UP (ref 3.5–5.3)
POTASSIUM SERPL-SCNC: 3.9 MMOL/L — SIGNIFICANT CHANGE UP (ref 3.5–5.3)
RBC # BLD: 4.08 M/UL — LOW (ref 4.2–5.8)
RBC # FLD: 15.5 % — HIGH (ref 10.3–14.5)
SODIUM SERPL-SCNC: 135 MMOL/L — SIGNIFICANT CHANGE UP (ref 135–145)
WBC # BLD: 5.75 K/UL — SIGNIFICANT CHANGE UP (ref 3.8–10.5)
WBC # FLD AUTO: 5.75 K/UL — SIGNIFICANT CHANGE UP (ref 3.8–10.5)

## 2017-01-30 RX ORDER — VALSARTAN 80 MG/1
1 TABLET ORAL
Qty: 60 | Refills: 0 | OUTPATIENT
Start: 2017-01-30 | End: 2017-03-01

## 2017-01-30 RX ORDER — FUROSEMIDE 40 MG
1 TABLET ORAL
Qty: 30 | Refills: 0 | OUTPATIENT
Start: 2017-01-30 | End: 2017-03-01

## 2017-01-30 RX ORDER — VALSARTAN 80 MG/1
2 TABLET ORAL
Qty: 60 | Refills: 0 | COMMUNITY
Start: 2017-01-30 | End: 2017-03-01

## 2017-01-30 RX ADMIN — Medication 650 MILLIGRAM(S): at 02:26

## 2017-01-30 RX ADMIN — Medication 80 MILLIGRAM(S): at 05:27

## 2017-01-30 RX ADMIN — Medication 650 MILLIGRAM(S): at 02:53

## 2017-01-30 RX ADMIN — Medication 1 MILLIGRAM(S): at 11:48

## 2017-01-30 RX ADMIN — CARVEDILOL PHOSPHATE 25 MILLIGRAM(S): 80 CAPSULE, EXTENDED RELEASE ORAL at 05:27

## 2017-01-30 RX ADMIN — RIVAROXABAN 20 MILLIGRAM(S): KIT at 11:48

## 2017-02-06 ENCOUNTER — APPOINTMENT (OUTPATIENT)
Dept: CARDIOLOGY | Facility: CLINIC | Age: 67
End: 2017-02-06

## 2017-02-08 VITALS
BODY MASS INDEX: 25.46 KG/M2 | DIASTOLIC BLOOD PRESSURE: 78 MMHG | HEIGHT: 68 IN | SYSTOLIC BLOOD PRESSURE: 115 MMHG | WEIGHT: 168 LBS

## 2017-02-21 ENCOUNTER — INPATIENT (INPATIENT)
Facility: HOSPITAL | Age: 67
LOS: 7 days | Discharge: HOME HEALTH | End: 2017-03-01
Attending: INTERNAL MEDICINE | Admitting: INTERNAL MEDICINE
Payer: MEDICARE

## 2017-02-21 VITALS
DIASTOLIC BLOOD PRESSURE: 79 MMHG | HEART RATE: 104 BPM | SYSTOLIC BLOOD PRESSURE: 114 MMHG | OXYGEN SATURATION: 99 % | RESPIRATION RATE: 18 BRPM | TEMPERATURE: 97 F

## 2017-02-21 DIAGNOSIS — I50.23 ACUTE ON CHRONIC SYSTOLIC (CONGESTIVE) HEART FAILURE: ICD-10-CM

## 2017-02-21 DIAGNOSIS — S69.91XA UNSPECIFIED INJURY OF RIGHT WRIST, HAND AND FINGER(S), INITIAL ENCOUNTER: ICD-10-CM

## 2017-02-21 DIAGNOSIS — R55 SYNCOPE AND COLLAPSE: ICD-10-CM

## 2017-02-21 DIAGNOSIS — I50.9 HEART FAILURE, UNSPECIFIED: ICD-10-CM

## 2017-02-21 DIAGNOSIS — I10 ESSENTIAL (PRIMARY) HYPERTENSION: ICD-10-CM

## 2017-02-21 DIAGNOSIS — I48.91 UNSPECIFIED ATRIAL FIBRILLATION: ICD-10-CM

## 2017-02-21 DIAGNOSIS — I49.01 VENTRICULAR FIBRILLATION: ICD-10-CM

## 2017-02-21 DIAGNOSIS — Z41.8 ENCOUNTER FOR OTHER PROCEDURES FOR PURPOSES OTHER THAN REMEDYING HEALTH STATE: ICD-10-CM

## 2017-02-21 LAB
ALBUMIN SERPL ELPH-MCNC: 3.5 G/DL — SIGNIFICANT CHANGE UP (ref 3.3–5)
ALP SERPL-CCNC: 73 U/L — SIGNIFICANT CHANGE UP (ref 40–120)
ALT FLD-CCNC: 26 U/L — SIGNIFICANT CHANGE UP (ref 4–41)
APTT BLD: 32.9 SEC — SIGNIFICANT CHANGE UP (ref 27.5–37.4)
AST SERPL-CCNC: 28 U/L — SIGNIFICANT CHANGE UP (ref 4–40)
BASOPHILS # BLD AUTO: 0.02 K/UL — SIGNIFICANT CHANGE UP (ref 0–0.2)
BASOPHILS NFR BLD AUTO: 0.3 % — SIGNIFICANT CHANGE UP (ref 0–2)
BILIRUB SERPL-MCNC: 1.3 MG/DL — HIGH (ref 0.2–1.2)
BLD GP AB SCN SERPL QL: NEGATIVE — SIGNIFICANT CHANGE UP
BUN SERPL-MCNC: 23 MG/DL — SIGNIFICANT CHANGE UP (ref 7–23)
CALCIUM SERPL-MCNC: 9.3 MG/DL — SIGNIFICANT CHANGE UP (ref 8.4–10.5)
CHLORIDE SERPL-SCNC: 103 MMOL/L — SIGNIFICANT CHANGE UP (ref 98–107)
CK MB BLD-MCNC: 1.08 NG/ML — SIGNIFICANT CHANGE UP (ref 1–6.6)
CK MB BLD-MCNC: 1.19 NG/ML — SIGNIFICANT CHANGE UP (ref 1–6.6)
CK MB BLD-MCNC: SIGNIFICANT CHANGE UP (ref 0–2.5)
CK MB BLD-MCNC: SIGNIFICANT CHANGE UP (ref 0–2.5)
CK SERPL-CCNC: 73 U/L — SIGNIFICANT CHANGE UP (ref 30–200)
CK SERPL-CCNC: 78 U/L — SIGNIFICANT CHANGE UP (ref 30–200)
CO2 SERPL-SCNC: 23 MMOL/L — SIGNIFICANT CHANGE UP (ref 22–31)
CREAT SERPL-MCNC: 1.03 MG/DL — SIGNIFICANT CHANGE UP (ref 0.5–1.3)
EOSINOPHIL # BLD AUTO: 0.07 K/UL — SIGNIFICANT CHANGE UP (ref 0–0.5)
EOSINOPHIL NFR BLD AUTO: 1 % — SIGNIFICANT CHANGE UP (ref 0–6)
GLUCOSE SERPL-MCNC: 85 MG/DL — SIGNIFICANT CHANGE UP (ref 70–99)
HCT VFR BLD CALC: 34.6 % — LOW (ref 39–50)
HGB BLD-MCNC: 11.1 G/DL — LOW (ref 13–17)
IMM GRANULOCYTES NFR BLD AUTO: 0.1 % — SIGNIFICANT CHANGE UP (ref 0–1.5)
INR BLD: 1.69 — HIGH (ref 0.87–1.18)
LYMPHOCYTES # BLD AUTO: 2 K/UL — SIGNIFICANT CHANGE UP (ref 1–3.3)
LYMPHOCYTES # BLD AUTO: 28.2 % — SIGNIFICANT CHANGE UP (ref 13–44)
MAGNESIUM SERPL-MCNC: 2.2 MG/DL — SIGNIFICANT CHANGE UP (ref 1.6–2.6)
MCHC RBC-ENTMCNC: 31 PG — SIGNIFICANT CHANGE UP (ref 27–34)
MCHC RBC-ENTMCNC: 32.1 % — SIGNIFICANT CHANGE UP (ref 32–36)
MCV RBC AUTO: 96.6 FL — SIGNIFICANT CHANGE UP (ref 80–100)
MONOCYTES # BLD AUTO: 0.74 K/UL — SIGNIFICANT CHANGE UP (ref 0–0.9)
MONOCYTES NFR BLD AUTO: 10.4 % — SIGNIFICANT CHANGE UP (ref 2–14)
NEUTROPHILS # BLD AUTO: 4.26 K/UL — SIGNIFICANT CHANGE UP (ref 1.8–7.4)
NEUTROPHILS NFR BLD AUTO: 60 % — SIGNIFICANT CHANGE UP (ref 43–77)
NT-PROBNP SERPL-SCNC: 6041 PG/ML — SIGNIFICANT CHANGE UP
NT-PROBNP SERPL-SCNC: 6751 PG/ML — SIGNIFICANT CHANGE UP
PLATELET # BLD AUTO: 291 K/UL — SIGNIFICANT CHANGE UP (ref 150–400)
PMV BLD: 10.9 FL — SIGNIFICANT CHANGE UP (ref 7–13)
POTASSIUM SERPL-MCNC: 4.3 MMOL/L — SIGNIFICANT CHANGE UP (ref 3.5–5.3)
POTASSIUM SERPL-SCNC: 4.3 MMOL/L — SIGNIFICANT CHANGE UP (ref 3.5–5.3)
PROT SERPL-MCNC: 7.3 G/DL — SIGNIFICANT CHANGE UP (ref 6–8.3)
PROTHROM AB SERPL-ACNC: 19.4 SEC — HIGH (ref 10–13.1)
RBC # BLD: 3.58 M/UL — LOW (ref 4.2–5.8)
RBC # FLD: 16.7 % — HIGH (ref 10.3–14.5)
RH IG SCN BLD-IMP: POSITIVE — SIGNIFICANT CHANGE UP
SODIUM SERPL-SCNC: 143 MMOL/L — SIGNIFICANT CHANGE UP (ref 135–145)
TROPONIN T SERPL-MCNC: < 0.06 NG/ML — SIGNIFICANT CHANGE UP (ref 0–0.06)
TROPONIN T SERPL-MCNC: < 0.06 NG/ML — SIGNIFICANT CHANGE UP (ref 0–0.06)
WBC # BLD: 7.1 K/UL — SIGNIFICANT CHANGE UP (ref 3.8–10.5)
WBC # FLD AUTO: 7.1 K/UL — SIGNIFICANT CHANGE UP (ref 3.8–10.5)

## 2017-02-21 PROCEDURE — 73130 X-RAY EXAM OF HAND: CPT | Mod: 26,RT

## 2017-02-21 PROCEDURE — 71020: CPT | Mod: 26

## 2017-02-21 PROCEDURE — 99223 1ST HOSP IP/OBS HIGH 75: CPT | Mod: GC

## 2017-02-21 PROCEDURE — 73562 X-RAY EXAM OF KNEE 3: CPT | Mod: 26,RT

## 2017-02-21 PROCEDURE — 93283 PRGRMG EVAL IMPLANTABLE DFB: CPT | Mod: 26

## 2017-02-21 PROCEDURE — 99222 1ST HOSP IP/OBS MODERATE 55: CPT | Mod: GC

## 2017-02-21 RX ORDER — VALSARTAN 80 MG/1
40 TABLET ORAL DAILY
Qty: 0 | Refills: 0 | Status: DISCONTINUED | OUTPATIENT
Start: 2017-02-21 | End: 2017-02-22

## 2017-02-21 RX ORDER — CARVEDILOL PHOSPHATE 80 MG/1
12.5 CAPSULE, EXTENDED RELEASE ORAL EVERY 12 HOURS
Qty: 0 | Refills: 0 | Status: DISCONTINUED | OUTPATIENT
Start: 2017-02-21 | End: 2017-02-24

## 2017-02-21 RX ORDER — FUROSEMIDE 40 MG
80 TABLET ORAL ONCE
Qty: 0 | Refills: 0 | Status: COMPLETED | OUTPATIENT
Start: 2017-02-21 | End: 2017-02-21

## 2017-02-21 RX ORDER — RIVAROXABAN 15 MG-20MG
20 KIT ORAL DAILY
Qty: 0 | Refills: 0 | Status: DISCONTINUED | OUTPATIENT
Start: 2017-02-21 | End: 2017-03-01

## 2017-02-21 RX ORDER — ACETAMINOPHEN 500 MG
650 TABLET ORAL ONCE
Qty: 0 | Refills: 0 | Status: COMPLETED | OUTPATIENT
Start: 2017-02-21 | End: 2017-02-21

## 2017-02-21 RX ORDER — FOLIC ACID 0.8 MG
1 TABLET ORAL DAILY
Qty: 0 | Refills: 0 | Status: DISCONTINUED | OUTPATIENT
Start: 2017-02-21 | End: 2017-03-01

## 2017-02-21 RX ORDER — FUROSEMIDE 40 MG
80 TABLET ORAL EVERY 12 HOURS
Qty: 0 | Refills: 0 | Status: DISCONTINUED | OUTPATIENT
Start: 2017-02-21 | End: 2017-02-21

## 2017-02-21 RX ORDER — DOFETILIDE 0.25 MG/1
1 CAPSULE ORAL
Qty: 0 | Refills: 0 | COMMUNITY

## 2017-02-21 RX ORDER — FUROSEMIDE 40 MG
40 TABLET ORAL EVERY 12 HOURS
Qty: 0 | Refills: 0 | Status: DISCONTINUED | OUTPATIENT
Start: 2017-02-22 | End: 2017-02-22

## 2017-02-21 RX ADMIN — Medication 650 MILLIGRAM(S): at 21:22

## 2017-02-21 RX ADMIN — CARVEDILOL PHOSPHATE 12.5 MILLIGRAM(S): 80 CAPSULE, EXTENDED RELEASE ORAL at 19:04

## 2017-02-21 RX ADMIN — Medication 80 MILLIGRAM(S): at 18:01

## 2017-02-21 NOTE — ED PROVIDER NOTE - OBJECTIVE STATEMENT
ATTG NOTE DR. RATLIFF 66M presents to the ED after feeling etreme dizziness and weakness yesterday.  Yesterday while walking he felt dizzy and went down onto his right side.  Pt states he fell onto his right wrist, and rigth knee.  denies hitting his head, states he was out for 1-2 minutes.  No preceding chest pain, no tongue biting, no urinary or bowel incontinence.  Pt had palpitations 3 days prior.  +cough non productive, no fever, no chills.  No similar episodes.  Pt was recently admitted for CHF exacerbation one week ago and had his lasix increased and decreased his water intake. 65 y/o M with h/o CHF, AICD, a-fib on xarelto presents with dizziness and near syncope yesterday. States he was walking when he felt lightheaded and fell to the floor on his right side yesterday. Stayed conscious throughout episode and denies hitting his head. Has pain in R hand and R knee. Able to ambulate. Went to see Dr. Kessler in clinic yesterday but clinic was closed. Denies chest pain, SOB, palpitations. S/P admission for CHF exacerbation and had lasix increased with improvement in LE edema and SOB.  ATTG NOTE DR. RATLIFF 66M presents to the ED after feeling etreme dizziness and weakness yesterday.  Yesterday while walking he felt dizzy and went down onto his right side.  Pt states he fell onto his right wrist, and rigth knee.  denies hitting his head, states he was out for 1-2 minutes.  No preceding chest pain, no tongue biting, no urinary or bowel incontinence.  Pt had palpitations 3 days prior.  +cough non productive, no fever, no chills.  No similar episodes.  Pt was recently admitted for CHF exacerbation one week ago and had his Lasix increased and decreased his water intake.

## 2017-02-21 NOTE — H&P ADULT. - PROBLEM SELECTOR PLAN 2
continue anticoagulation with xeralto.  serial EKGs tele monitor  Jordan x2  Treat decompensated HF  House EP following for possible antiarrhythmics tele monitor  Jordan x2  Treat decompensated HF  House EP following for possible antiarrhythmics  follow Mg level add-on

## 2017-02-21 NOTE — H&P ADULT. - PROBLEM SELECTOR PLAN 1
continue medication regimen with lasix and carvedilol.  monitor daily weights, strict I & O, sodium restricted diet tele monitor  cardiac enzymes x 2 to R/O ACS.  serial EKGs  2G Sodium diet with 1500 cc Fluid restriction  Strict I&Os plus Daily weights.  Lasix 40mg IV Q12   CHF Team consulted  Re-educated on HF diet

## 2017-02-21 NOTE — ED ADULT TRIAGE NOTE - CHIEF COMPLAINT QUOTE
elizabetha from home c/o sob on exertion , reports syncopal episode yesterday , denies hitting head, on xarelto pmh- chf, afib, defibrilator

## 2017-02-21 NOTE — ED ADULT NURSE NOTE - OBJECTIVE STATEMENT
pt is a 66 yr old male BIBA c/o SOB/ LOBATO this AM while going up stairs. pt states passing out for a "second" yesterday, pt was lightheaded and weak feeling at the time, pt denies hitting head, states hit right hand and leg, pt walks with a cane at baseline /p hip srg 4 years ago. pt denies pain at this time, pt appears calm and comfortable, pt coughing periodically, denies fever, chill, recent illness, states coughing more at night according to family in home. PIV noted in left hand #18 G. labs done, EKG done. PMH- AICD/ PPM, A-fib, HTN, CHF, hip replacement.

## 2017-02-21 NOTE — ED PROVIDER NOTE - PHYSICAL EXAMINATION
ATTENDING PHYSICAL EXAM DR. RATLIFF ***GEN - NAD; well appearing; A+O x3 ***HEAD - NC/AT ***EYES/NOSE - PERRL, EOMI, mucous membranes moist, no discharge ***THROAT: Oral cavity and pharynx normal. No inflammation, swelling, exudate, or lesions.  ***NECK: Neck supple, non-tender without lymphadenopathy, no masses, no thyromegaly.   ***PULMONARY - CTA b/l, symmetric breath sounds. ***CARDIAC -s1s2, RRR, no M,G,R  ***ABDOMEN - +BS, ND, NT, soft, no guarding, no rebound, no masses   ***BACK - no CVA tenderness, Normal  spine ***EXTREMITIES - symmetric pulses, 2+ dp, capillary refill < 2 seconds, no clubbing, no cyanosis, mild right knee effusion, FROM, no laxity, +tenderness to patella, able to extende fully, rigth wrist and hand swelling, +tenderness to metacarpals, no snuff box tend, no pain with axial loading ***SKIN - no rash or bruising   ***NEUROLOGIC - alert

## 2017-02-21 NOTE — ED PROVIDER NOTE - PSH
AICD (Automatic Cardioverter/Defibrillator) Present  [employer] with 1 [employer] lead4/1/09- explanted and replaced with [employer] Platformtronic 2 leads on 9/2/09  History of Prior Ablation Treatment    Status post left hip replacement

## 2017-02-21 NOTE — H&P ADULT. - NEGATIVE GENERAL SYMPTOMS
no chills/no sweating/no polyuria/no polydipsia/no weight loss/no fever/no polyphagia/no weight gain/no anorexia no anorexia/no polyuria/no polyphagia/no sweating/no polydipsia/no fever/no chills

## 2017-02-21 NOTE — H&P ADULT. - CARDIOVASCULAR DETAILS
irregular rate and rhythm/positive S2/positive S1 positive S1/positive S2/irregular rate and rhythm/tachycardia

## 2017-02-21 NOTE — ED ADULT NURSE NOTE - PSH
AICD (Automatic Cardioverter/Defibrillator) Present  [employer] with 1 [employer] lead4/1/09- explanted and replaced with [employer]onic 2 leads on 9/2/09  History of Prior Ablation Treatment    Status post left hip replacement

## 2017-02-21 NOTE — H&P ADULT. - PSH
AICD (Automatic Cardioverter/Defibrillator) Present  [employer] with 1 [employer] lead4/1/09- explanted and replaced with X[employer] Systemstronic 2 leads on 9/2/09  History of Prior Ablation Treatment    Status post left hip replacement

## 2017-02-21 NOTE — H&P ADULT. - RS GEN PE MLT RESP DETAILS PC
wheezes/breath sounds equal/good air movement/respirations labored/airway patent rales/wheezes/breath sounds equal/airway patent/good air movement

## 2017-02-21 NOTE — H&P ADULT. - ASSESSMENT
67 yo M with pmhx of CHF, HTN, AICD, Afib on xeralto, presents to the ED status post syncopal episode outside his home yesterday. Admitted to Tele secondary to CHF exacerbation with EKG changes. 65 yo male p/w worsening SOB, became dizzy, lightheaded following by passing out, found to have 1 episode of Vfib with shock, admitted to tele for Syncope, Vfib, Acute on CHF.    +Syncope  +Vfib-->House EP followin  +Acute on CHF-->IV Lasix 40mg Q12h

## 2017-02-21 NOTE — H&P ADULT. - HISTORY OF PRESENT ILLNESS
67 yo M with pmhx of CHF, HTN, AICD, Afib on xeralto, presents to the ED status post syncopal episode outside his home yesterday. Pt went to Dr. Kessler in the clinic yesterday but the clinic was closed so he returned to the ED today. Pt admits that he felt dizzy, lightheaded and SOB prior to the episode, but denies LOC or head trauma. PT states that at his baseline he can walk less than 1/2 a block before becoming SOB and needs to stop and rest. Yesterday the pt did not stop to rest, resulting in a syncopal episode in which he landed on his L wrist and L knee. Pt states that the L wrist and knee feel swollen and tender. Pt states that he feels his legs are more swollen than usual. Pt denies current SOB, dizziness, CP, palpitations, HA, fever, chills, N/V/D, abdominal pain or bloating. 65 yo M with pmhx of CHF EF 23%, HTN, AICD, Afib on xeralto, presents to the ED status post syncopal episode outside his home yesterday. Pt went to Dr. Kessler in the clinic yesterday but the clinic was closed so he returned to the ED today. Pt admits that he felt dizzy, lightheaded and SOB prior to the episode, but denies LOC or head trauma. PT states that at his baseline he can walk 1 block before becoming SOB. However, for past 2 days pt developed dyspnea with minimal exertion such as walking to the bathroom or dressing up. Pt admitted to eating chinese food and cold cuts for past 2 days. Yesterday the pt did not stop to rest while walking, felt dizzy, lightheaded, and next thing he remembers he was lying on the ground with bystanders helping him to get up. Pt reports he landed on his R wrist and R knee during the fall. Pt states that the R wrist and knee feel swollen and tender. Pt states that he feels his legs are more swollen than usual. Pt denies currently SOB, dizziness, CP, palpitations, HA, fever, chills, N/V/D, abdominal pain or bloating.

## 2017-02-21 NOTE — H&P ADULT. - NEGATIVE NEUROLOGICAL SYMPTOMS
no generalized seizures/no loss of consciousness/no weakness/no tremors/no focal seizures/no facial palsy/no difficulty walking/no transient paralysis/no hemiparesis/no vertigo/no headache/no confusion

## 2017-02-21 NOTE — H&P ADULT. - PROBLEM SELECTOR PLAN 7
-official film negative for  fracture, + generalized soft -tissue swelling  -pt denies numbness, weakness, but very painful with minimal wrist extension/ flexion  -will call hand in am

## 2017-02-21 NOTE — ED PROVIDER NOTE - MEDICAL DECISION MAKING DETAILS
Near Syncope with right wrist and right knee pain - AICD to be interrogated by Cards, labs / ua / chest xray / reassess - concern for dehydration given increased lasix dosing.

## 2017-02-22 LAB
BUN SERPL-MCNC: 26 MG/DL — HIGH (ref 7–23)
CALCIUM SERPL-MCNC: 9 MG/DL — SIGNIFICANT CHANGE UP (ref 8.4–10.5)
CHLORIDE SERPL-SCNC: 101 MMOL/L — SIGNIFICANT CHANGE UP (ref 98–107)
CO2 SERPL-SCNC: 22 MMOL/L — SIGNIFICANT CHANGE UP (ref 22–31)
CREAT SERPL-MCNC: 1.13 MG/DL — SIGNIFICANT CHANGE UP (ref 0.5–1.3)
GLUCOSE SERPL-MCNC: 90 MG/DL — SIGNIFICANT CHANGE UP (ref 70–99)
HCT VFR BLD CALC: 34.8 % — LOW (ref 39–50)
HGB BLD-MCNC: 10.9 G/DL — LOW (ref 13–17)
MCHC RBC-ENTMCNC: 29.9 PG — SIGNIFICANT CHANGE UP (ref 27–34)
MCHC RBC-ENTMCNC: 31.3 % — LOW (ref 32–36)
MCV RBC AUTO: 95.3 FL — SIGNIFICANT CHANGE UP (ref 80–100)
PLATELET # BLD AUTO: 279 K/UL — SIGNIFICANT CHANGE UP (ref 150–400)
PMV BLD: 10.8 FL — SIGNIFICANT CHANGE UP (ref 7–13)
POTASSIUM SERPL-MCNC: 4.1 MMOL/L — SIGNIFICANT CHANGE UP (ref 3.5–5.3)
POTASSIUM SERPL-SCNC: 4.1 MMOL/L — SIGNIFICANT CHANGE UP (ref 3.5–5.3)
RBC # BLD: 3.65 M/UL — LOW (ref 4.2–5.8)
RBC # FLD: 16.9 % — HIGH (ref 10.3–14.5)
SODIUM SERPL-SCNC: 139 MMOL/L — SIGNIFICANT CHANGE UP (ref 135–145)
WBC # BLD: 10.31 K/UL — SIGNIFICANT CHANGE UP (ref 3.8–10.5)
WBC # FLD AUTO: 10.31 K/UL — SIGNIFICANT CHANGE UP (ref 3.8–10.5)

## 2017-02-22 PROCEDURE — 99233 SBSQ HOSP IP/OBS HIGH 50: CPT

## 2017-02-22 PROCEDURE — 73110 X-RAY EXAM OF WRIST: CPT | Mod: 26,RT

## 2017-02-22 PROCEDURE — 99232 SBSQ HOSP IP/OBS MODERATE 35: CPT | Mod: GC

## 2017-02-22 PROCEDURE — 99223 1ST HOSP IP/OBS HIGH 75: CPT

## 2017-02-22 RX ORDER — COLCHICINE 0.6 MG
0.6 TABLET ORAL DAILY
Qty: 0 | Refills: 0 | Status: DISCONTINUED | OUTPATIENT
Start: 2017-02-23 | End: 2017-03-01

## 2017-02-22 RX ORDER — MEXILETINE HYDROCHLORIDE 150 MG/1
150 CAPSULE ORAL THREE TIMES A DAY
Qty: 0 | Refills: 0 | Status: DISCONTINUED | OUTPATIENT
Start: 2017-02-22 | End: 2017-03-01

## 2017-02-22 RX ORDER — COLCHICINE 0.6 MG
1.2 TABLET ORAL ONCE
Qty: 0 | Refills: 0 | Status: COMPLETED | OUTPATIENT
Start: 2017-02-22 | End: 2017-02-22

## 2017-02-22 RX ORDER — LIDOCAINE 4 G/100G
1 CREAM TOPICAL DAILY
Qty: 0 | Refills: 0 | Status: DISCONTINUED | OUTPATIENT
Start: 2017-02-22 | End: 2017-02-22

## 2017-02-22 RX ORDER — VALSARTAN 80 MG/1
80 TABLET ORAL
Qty: 0 | Refills: 0 | Status: DISCONTINUED | OUTPATIENT
Start: 2017-02-22 | End: 2017-02-22

## 2017-02-22 RX ORDER — COLCHICINE 0.6 MG
0.6 TABLET ORAL ONCE
Qty: 0 | Refills: 0 | Status: COMPLETED | OUTPATIENT
Start: 2017-02-22 | End: 2017-02-22

## 2017-02-22 RX ORDER — FUROSEMIDE 40 MG
80 TABLET ORAL
Qty: 0 | Refills: 0 | Status: DISCONTINUED | OUTPATIENT
Start: 2017-02-22 | End: 2017-02-26

## 2017-02-22 RX ORDER — VALSARTAN 80 MG/1
80 TABLET ORAL
Qty: 0 | Refills: 0 | Status: DISCONTINUED | OUTPATIENT
Start: 2017-02-23 | End: 2017-02-24

## 2017-02-22 RX ADMIN — Medication 1.2 MILLIGRAM(S): at 18:08

## 2017-02-22 RX ADMIN — Medication 80 MILLIGRAM(S): at 18:09

## 2017-02-22 RX ADMIN — Medication 0.6 MILLIGRAM(S): at 21:16

## 2017-02-22 RX ADMIN — CARVEDILOL PHOSPHATE 12.5 MILLIGRAM(S): 80 CAPSULE, EXTENDED RELEASE ORAL at 11:22

## 2017-02-22 RX ADMIN — VALSARTAN 40 MILLIGRAM(S): 80 TABLET ORAL at 05:18

## 2017-02-22 RX ADMIN — Medication 40 MILLIGRAM(S): at 05:18

## 2017-02-22 RX ADMIN — Medication 1 MILLIGRAM(S): at 11:22

## 2017-02-22 RX ADMIN — Medication 10 MILLIGRAM(S): at 05:18

## 2017-02-22 RX ADMIN — RIVAROXABAN 20 MILLIGRAM(S): KIT at 11:22

## 2017-02-22 RX ADMIN — CARVEDILOL PHOSPHATE 12.5 MILLIGRAM(S): 80 CAPSULE, EXTENDED RELEASE ORAL at 18:09

## 2017-02-22 RX ADMIN — MEXILETINE HYDROCHLORIDE 150 MILLIGRAM(S): 150 CAPSULE ORAL at 21:16

## 2017-02-22 NOTE — PHYSICAL THERAPY INITIAL EVALUATION ADULT - PERTINENT HX OF CURRENT PROBLEM, REHAB EVAL
65 yo male p/w worsening SOB, became dizzy, lightheaded following by passing out, found to have 1 episode of Vfib with shock, admitted to tele for Syncope, Vfib, Acute on CHF.

## 2017-02-22 NOTE — PHYSICAL THERAPY INITIAL EVALUATION ADULT - ACTIVE RANGE OF MOTION EXAMINATION, REHAB EVAL
bilateral  lower extremity Active ROM was WFL (within functional limits)/(R) wrist N/T 2* pain,(R) knee -15 to 95 degrees/deficits as listed below/bilateral upper extremity Active ROM was WFL (within functional limits)

## 2017-02-22 NOTE — PHYSICAL THERAPY INITIAL EVALUATION ADULT - GENERAL OBSERVATIONS, REHAB EVAL
Patient received semi supine in bed, in NAD (+) tele monitor, (+) swelling of (R) wrist/hands, c/o pain on (R) wrist and (R) knee with movements/activities ,mild SOB with activities which subsided at rest ,Cardiologist with the patient at this time of PT evaluation, RN made aware of pain..

## 2017-02-23 ENCOUNTER — APPOINTMENT (OUTPATIENT)
Dept: ORTHOPEDIC SURGERY | Facility: CLINIC | Age: 67
End: 2017-02-23

## 2017-02-23 LAB
BODY FLUID TYPE: SIGNIFICANT CHANGE UP
BUN SERPL-MCNC: 28 MG/DL — HIGH (ref 7–23)
CALCIUM SERPL-MCNC: 8.8 MG/DL — SIGNIFICANT CHANGE UP (ref 8.4–10.5)
CHLORIDE SERPL-SCNC: 103 MMOL/L — SIGNIFICANT CHANGE UP (ref 98–107)
CLARITY SPEC: SIGNIFICANT CHANGE UP
CO2 SERPL-SCNC: 21 MMOL/L — LOW (ref 22–31)
COLOR FLD: SIGNIFICANT CHANGE UP
CREAT SERPL-MCNC: 1.06 MG/DL — SIGNIFICANT CHANGE UP (ref 0.5–1.3)
CRYSTALS FLD MICRO: SIGNIFICANT CHANGE UP
EOSINOPHIL # FLD: 1 % — SIGNIFICANT CHANGE UP
GLUCOSE SERPL-MCNC: 89 MG/DL — SIGNIFICANT CHANGE UP (ref 70–99)
GRAM STN FLD: SIGNIFICANT CHANGE UP
HCT VFR BLD CALC: 34.9 % — LOW (ref 39–50)
HGB BLD-MCNC: 11 G/DL — LOW (ref 13–17)
LYMPHOCYTES NFR FLD: 20 % — SIGNIFICANT CHANGE UP
MACROPHAGES # FLD: 56 % — SIGNIFICANT CHANGE UP
MAGNESIUM SERPL-MCNC: 2.2 MG/DL — SIGNIFICANT CHANGE UP (ref 1.6–2.6)
MCHC RBC-ENTMCNC: 30.6 PG — SIGNIFICANT CHANGE UP (ref 27–34)
MCHC RBC-ENTMCNC: 31.5 % — LOW (ref 32–36)
MCV RBC AUTO: 96.9 FL — SIGNIFICANT CHANGE UP (ref 80–100)
NEUTS SEG NFR FLD MANUAL: 23 % — SIGNIFICANT CHANGE UP
NT-PROBNP SERPL-SCNC: 3832 PG/ML — SIGNIFICANT CHANGE UP
PLATELET # BLD AUTO: 276 K/UL — SIGNIFICANT CHANGE UP (ref 150–400)
PMV BLD: 11.4 FL — SIGNIFICANT CHANGE UP (ref 7–13)
POTASSIUM SERPL-MCNC: 4.1 MMOL/L — SIGNIFICANT CHANGE UP (ref 3.5–5.3)
POTASSIUM SERPL-SCNC: 4.1 MMOL/L — SIGNIFICANT CHANGE UP (ref 3.5–5.3)
RBC # BLD: 3.6 M/UL — LOW (ref 4.2–5.8)
RBC # FLD: 17 % — HIGH (ref 10.3–14.5)
RCV VOL RI: HIGH CELL/UL (ref 0–5)
SODIUM SERPL-SCNC: 143 MMOL/L — SIGNIFICANT CHANGE UP (ref 135–145)
SPECIMEN SOURCE: SIGNIFICANT CHANGE UP
TOTAL CELLS COUNTED, BODY FLUID: 100 CELLS — SIGNIFICANT CHANGE UP
TOTAL NUCLEATED CELL COUNT, BODY FLUID: 605 CELL/UL — HIGH (ref 0–5)
URATE SERPL-MCNC: 12.2 MG/DL — HIGH (ref 3.4–8.8)
WBC # BLD: 7.54 K/UL — SIGNIFICANT CHANGE UP (ref 3.8–10.5)
WBC # FLD AUTO: 7.54 K/UL — SIGNIFICANT CHANGE UP (ref 3.8–10.5)

## 2017-02-23 PROCEDURE — 99223 1ST HOSP IP/OBS HIGH 75: CPT | Mod: 25,GC

## 2017-02-23 PROCEDURE — 88108 CYTOPATH CONCENTRATE TECH: CPT | Mod: 26

## 2017-02-23 PROCEDURE — 99233 SBSQ HOSP IP/OBS HIGH 50: CPT

## 2017-02-23 PROCEDURE — 20610 DRAIN/INJ JOINT/BURSA W/O US: CPT | Mod: RT,GC

## 2017-02-23 PROCEDURE — 99232 SBSQ HOSP IP/OBS MODERATE 35: CPT | Mod: GC

## 2017-02-23 RX ADMIN — CARVEDILOL PHOSPHATE 12.5 MILLIGRAM(S): 80 CAPSULE, EXTENDED RELEASE ORAL at 17:22

## 2017-02-23 RX ADMIN — CARVEDILOL PHOSPHATE 12.5 MILLIGRAM(S): 80 CAPSULE, EXTENDED RELEASE ORAL at 05:36

## 2017-02-23 RX ADMIN — MEXILETINE HYDROCHLORIDE 150 MILLIGRAM(S): 150 CAPSULE ORAL at 05:36

## 2017-02-23 RX ADMIN — VALSARTAN 80 MILLIGRAM(S): 80 TABLET ORAL at 17:22

## 2017-02-23 RX ADMIN — Medication 20 MILLIGRAM(S): at 17:22

## 2017-02-23 RX ADMIN — Medication 80 MILLIGRAM(S): at 05:37

## 2017-02-23 RX ADMIN — Medication 0.6 MILLIGRAM(S): at 13:26

## 2017-02-23 RX ADMIN — VALSARTAN 80 MILLIGRAM(S): 80 TABLET ORAL at 05:37

## 2017-02-23 RX ADMIN — MEXILETINE HYDROCHLORIDE 150 MILLIGRAM(S): 150 CAPSULE ORAL at 21:38

## 2017-02-23 RX ADMIN — Medication 80 MILLIGRAM(S): at 17:22

## 2017-02-23 RX ADMIN — MEXILETINE HYDROCHLORIDE 150 MILLIGRAM(S): 150 CAPSULE ORAL at 13:26

## 2017-02-23 RX ADMIN — Medication 1 MILLIGRAM(S): at 13:26

## 2017-02-23 RX ADMIN — RIVAROXABAN 20 MILLIGRAM(S): KIT at 13:26

## 2017-02-24 LAB
BASOPHILS # BLD AUTO: 0.01 K/UL — SIGNIFICANT CHANGE UP (ref 0–0.2)
BASOPHILS NFR BLD AUTO: 0.1 % — SIGNIFICANT CHANGE UP (ref 0–2)
BUN SERPL-MCNC: 31 MG/DL — HIGH (ref 7–23)
CALCIUM SERPL-MCNC: 9 MG/DL — SIGNIFICANT CHANGE UP (ref 8.4–10.5)
CHLORIDE SERPL-SCNC: 104 MMOL/L — SIGNIFICANT CHANGE UP (ref 98–107)
CO2 SERPL-SCNC: 22 MMOL/L — SIGNIFICANT CHANGE UP (ref 22–31)
CREAT SERPL-MCNC: 1.08 MG/DL — SIGNIFICANT CHANGE UP (ref 0.5–1.3)
EOSINOPHIL # BLD AUTO: 0.01 K/UL — SIGNIFICANT CHANGE UP (ref 0–0.5)
EOSINOPHIL NFR BLD AUTO: 0.1 % — SIGNIFICANT CHANGE UP (ref 0–6)
GLUCOSE SERPL-MCNC: 127 MG/DL — HIGH (ref 70–99)
HCT VFR BLD CALC: 36.3 % — LOW (ref 39–50)
HGB BLD-MCNC: 11.7 G/DL — LOW (ref 13–17)
IMM GRANULOCYTES NFR BLD AUTO: 0.2 % — SIGNIFICANT CHANGE UP (ref 0–1.5)
LYMPHOCYTES # BLD AUTO: 1.16 K/UL — SIGNIFICANT CHANGE UP (ref 1–3.3)
LYMPHOCYTES # BLD AUTO: 8.8 % — LOW (ref 13–44)
MAGNESIUM SERPL-MCNC: 2.4 MG/DL — SIGNIFICANT CHANGE UP (ref 1.6–2.6)
MCHC RBC-ENTMCNC: 30.5 PG — SIGNIFICANT CHANGE UP (ref 27–34)
MCHC RBC-ENTMCNC: 32.2 % — SIGNIFICANT CHANGE UP (ref 32–36)
MCV RBC AUTO: 94.5 FL — SIGNIFICANT CHANGE UP (ref 80–100)
MONOCYTES # BLD AUTO: 0.65 K/UL — SIGNIFICANT CHANGE UP (ref 0–0.9)
MONOCYTES NFR BLD AUTO: 4.9 % — SIGNIFICANT CHANGE UP (ref 2–14)
NEUTROPHILS # BLD AUTO: 11.35 K/UL — HIGH (ref 1.8–7.4)
NEUTROPHILS NFR BLD AUTO: 85.9 % — HIGH (ref 43–77)
PLATELET # BLD AUTO: 300 K/UL — SIGNIFICANT CHANGE UP (ref 150–400)
PMV BLD: 10.7 FL — SIGNIFICANT CHANGE UP (ref 7–13)
POTASSIUM SERPL-MCNC: 4.3 MMOL/L — SIGNIFICANT CHANGE UP (ref 3.5–5.3)
POTASSIUM SERPL-SCNC: 4.3 MMOL/L — SIGNIFICANT CHANGE UP (ref 3.5–5.3)
RBC # BLD: 3.84 M/UL — LOW (ref 4.2–5.8)
RBC # FLD: 16.6 % — HIGH (ref 10.3–14.5)
SODIUM SERPL-SCNC: 141 MMOL/L — SIGNIFICANT CHANGE UP (ref 135–145)
WBC # BLD: 13.21 K/UL — HIGH (ref 3.8–10.5)
WBC # FLD AUTO: 13.21 K/UL — HIGH (ref 3.8–10.5)

## 2017-02-24 PROCEDURE — 99233 SBSQ HOSP IP/OBS HIGH 50: CPT

## 2017-02-24 PROCEDURE — 99233 SBSQ HOSP IP/OBS HIGH 50: CPT | Mod: GC

## 2017-02-24 PROCEDURE — 99232 SBSQ HOSP IP/OBS MODERATE 35: CPT | Mod: GC

## 2017-02-24 RX ORDER — METOPROLOL TARTRATE 50 MG
100 TABLET ORAL DAILY
Qty: 0 | Refills: 0 | Status: DISCONTINUED | OUTPATIENT
Start: 2017-02-24 | End: 2017-03-01

## 2017-02-24 RX ORDER — VALSARTAN 80 MG/1
120 TABLET ORAL
Qty: 0 | Refills: 0 | Status: DISCONTINUED | OUTPATIENT
Start: 2017-02-24 | End: 2017-02-28

## 2017-02-24 RX ADMIN — RIVAROXABAN 20 MILLIGRAM(S): KIT at 14:17

## 2017-02-24 RX ADMIN — Medication 100 MILLIGRAM(S): at 20:57

## 2017-02-24 RX ADMIN — Medication 1 MILLIGRAM(S): at 14:17

## 2017-02-24 RX ADMIN — Medication 20 MILLIGRAM(S): at 06:37

## 2017-02-24 RX ADMIN — VALSARTAN 80 MILLIGRAM(S): 80 TABLET ORAL at 06:37

## 2017-02-24 RX ADMIN — Medication 80 MILLIGRAM(S): at 17:34

## 2017-02-24 RX ADMIN — Medication 20 MILLIGRAM(S): at 20:57

## 2017-02-24 RX ADMIN — MEXILETINE HYDROCHLORIDE 150 MILLIGRAM(S): 150 CAPSULE ORAL at 21:27

## 2017-02-24 RX ADMIN — Medication 80 MILLIGRAM(S): at 06:37

## 2017-02-24 RX ADMIN — MEXILETINE HYDROCHLORIDE 150 MILLIGRAM(S): 150 CAPSULE ORAL at 06:37

## 2017-02-24 RX ADMIN — Medication 0.6 MILLIGRAM(S): at 14:17

## 2017-02-24 RX ADMIN — CARVEDILOL PHOSPHATE 12.5 MILLIGRAM(S): 80 CAPSULE, EXTENDED RELEASE ORAL at 06:37

## 2017-02-24 RX ADMIN — MEXILETINE HYDROCHLORIDE 150 MILLIGRAM(S): 150 CAPSULE ORAL at 14:17

## 2017-02-24 NOTE — DIETITIAN INITIAL EVALUATION ADULT. - ADHERENCE
Pt. endorses poor adherence to recommended diet.  Pt states that he continues to eat out at [employer] and Chinese take out./poor

## 2017-02-24 NOTE — DIETITIAN INITIAL EVALUATION ADULT. - PROBLEM SELECTOR PLAN 1
tele monitor  cardiac enzymes x 2 to R/O ACS.  serial EKGs  2G Sodium diet with 1500 cc Fluid restriction  Strict I&Os plus Daily weights.  Lasix 40mg IV Q12   CHF Team consulted  Re-educated on HF diet

## 2017-02-24 NOTE — DIETITIAN INITIAL EVALUATION ADULT. - PROBLEM SELECTOR PLAN 3
tele monitor   cardiac enzymes x 2  Orthostatic blood pressures  DASH diet with 1500 cc Fluid restriction  Treat heart failure

## 2017-02-24 NOTE — DIETITIAN INITIAL EVALUATION ADULT. - OTHER INFO
Nutrition consult received for RD-heart failure linked order set. Patient reports good PO intake at present and prior to admission. Patient denies any nausea/vomiting/diarrhea/constipation or difficulty chewing and swallowing. No report of chewing or swallowing difficulties.  Pt endorses poor adherence to recommended diet modifications.  Pt last seen & educated, by this RD on 1/26.  He is able to verbalize recommended diet modifications, and recommended diet reinforced.  Pt state that he now sees the relationship between nutrition and  his disease state and endorses plan for compliance upon discharge.

## 2017-02-24 NOTE — DIETITIAN INITIAL EVALUATION ADULT. - NS AS NUTRI INTERV ED CONTENT
Nutrition relationship to health/disease/Purpose of the nutrition education/Survival information/Recommended modifications/Priority modifications

## 2017-02-24 NOTE — DIETITIAN INITIAL EVALUATION ADULT. - PROBLEM SELECTOR PLAN 2
tele monitor  Jordan x2  Treat decompensated HF  House EP following for possible antiarrhythmics  follow Mg level add-on

## 2017-02-25 LAB
BASOPHILS # BLD AUTO: 0.01 K/UL — SIGNIFICANT CHANGE UP (ref 0–0.2)
BASOPHILS NFR BLD AUTO: 0.1 % — SIGNIFICANT CHANGE UP (ref 0–2)
BUN SERPL-MCNC: 34 MG/DL — HIGH (ref 7–23)
CALCIUM SERPL-MCNC: 8.9 MG/DL — SIGNIFICANT CHANGE UP (ref 8.4–10.5)
CHLORIDE SERPL-SCNC: 101 MMOL/L — SIGNIFICANT CHANGE UP (ref 98–107)
CO2 SERPL-SCNC: 21 MMOL/L — LOW (ref 22–31)
CREAT SERPL-MCNC: 1.22 MG/DL — SIGNIFICANT CHANGE UP (ref 0.5–1.3)
EOSINOPHIL # BLD AUTO: 0.02 K/UL — SIGNIFICANT CHANGE UP (ref 0–0.5)
EOSINOPHIL NFR BLD AUTO: 0.1 % — SIGNIFICANT CHANGE UP (ref 0–6)
GLUCOSE SERPL-MCNC: 121 MG/DL — HIGH (ref 70–99)
HCT VFR BLD CALC: 36.9 % — LOW (ref 39–50)
HGB BLD-MCNC: 11.6 G/DL — LOW (ref 13–17)
IMM GRANULOCYTES NFR BLD AUTO: 0.3 % — SIGNIFICANT CHANGE UP (ref 0–1.5)
LYMPHOCYTES # BLD AUTO: 1.24 K/UL — SIGNIFICANT CHANGE UP (ref 1–3.3)
LYMPHOCYTES # BLD AUTO: 7.4 % — LOW (ref 13–44)
MAGNESIUM SERPL-MCNC: 2.4 MG/DL — SIGNIFICANT CHANGE UP (ref 1.6–2.6)
MCHC RBC-ENTMCNC: 30.4 PG — SIGNIFICANT CHANGE UP (ref 27–34)
MCHC RBC-ENTMCNC: 31.4 % — LOW (ref 32–36)
MCV RBC AUTO: 96.9 FL — SIGNIFICANT CHANGE UP (ref 80–100)
MONOCYTES # BLD AUTO: 0.95 K/UL — HIGH (ref 0–0.9)
MONOCYTES NFR BLD AUTO: 5.7 % — SIGNIFICANT CHANGE UP (ref 2–14)
NEUTROPHILS # BLD AUTO: 14.46 K/UL — HIGH (ref 1.8–7.4)
NEUTROPHILS NFR BLD AUTO: 86.4 % — HIGH (ref 43–77)
PLATELET # BLD AUTO: 332 K/UL — SIGNIFICANT CHANGE UP (ref 150–400)
PMV BLD: 11 FL — SIGNIFICANT CHANGE UP (ref 7–13)
POTASSIUM SERPL-MCNC: 4.5 MMOL/L — SIGNIFICANT CHANGE UP (ref 3.5–5.3)
POTASSIUM SERPL-SCNC: 4.5 MMOL/L — SIGNIFICANT CHANGE UP (ref 3.5–5.3)
RBC # BLD: 3.81 M/UL — LOW (ref 4.2–5.8)
RBC # FLD: 16.9 % — HIGH (ref 10.3–14.5)
SODIUM SERPL-SCNC: 139 MMOL/L — SIGNIFICANT CHANGE UP (ref 135–145)
WBC # BLD: 16.73 K/UL — HIGH (ref 3.8–10.5)
WBC # FLD AUTO: 16.73 K/UL — HIGH (ref 3.8–10.5)

## 2017-02-25 PROCEDURE — 99232 SBSQ HOSP IP/OBS MODERATE 35: CPT

## 2017-02-25 PROCEDURE — 99233 SBSQ HOSP IP/OBS HIGH 50: CPT

## 2017-02-25 RX ORDER — SPIRONOLACTONE 25 MG/1
12.5 TABLET, FILM COATED ORAL DAILY
Qty: 0 | Refills: 0 | Status: DISCONTINUED | OUTPATIENT
Start: 2017-02-25 | End: 2017-03-01

## 2017-02-25 RX ADMIN — MEXILETINE HYDROCHLORIDE 150 MILLIGRAM(S): 150 CAPSULE ORAL at 21:01

## 2017-02-25 RX ADMIN — Medication 100 MILLIGRAM(S): at 13:01

## 2017-02-25 RX ADMIN — RIVAROXABAN 20 MILLIGRAM(S): KIT at 13:01

## 2017-02-25 RX ADMIN — MEXILETINE HYDROCHLORIDE 150 MILLIGRAM(S): 150 CAPSULE ORAL at 13:01

## 2017-02-25 RX ADMIN — Medication 80 MILLIGRAM(S): at 05:28

## 2017-02-25 RX ADMIN — VALSARTAN 120 MILLIGRAM(S): 80 TABLET ORAL at 17:17

## 2017-02-25 RX ADMIN — MEXILETINE HYDROCHLORIDE 150 MILLIGRAM(S): 150 CAPSULE ORAL at 05:28

## 2017-02-25 RX ADMIN — Medication 1 MILLIGRAM(S): at 13:01

## 2017-02-25 RX ADMIN — Medication 80 MILLIGRAM(S): at 17:17

## 2017-02-25 RX ADMIN — Medication 20 MILLIGRAM(S): at 05:28

## 2017-02-25 RX ADMIN — Medication 0.6 MILLIGRAM(S): at 13:01

## 2017-02-25 RX ADMIN — VALSARTAN 120 MILLIGRAM(S): 80 TABLET ORAL at 05:45

## 2017-02-26 LAB
BUN SERPL-MCNC: 36 MG/DL — HIGH (ref 7–23)
CALCIUM SERPL-MCNC: 8.5 MG/DL — SIGNIFICANT CHANGE UP (ref 8.4–10.5)
CHLORIDE SERPL-SCNC: 102 MMOL/L — SIGNIFICANT CHANGE UP (ref 98–107)
CO2 SERPL-SCNC: 22 MMOL/L — SIGNIFICANT CHANGE UP (ref 22–31)
CREAT SERPL-MCNC: 1.16 MG/DL — SIGNIFICANT CHANGE UP (ref 0.5–1.3)
GLUCOSE SERPL-MCNC: 80 MG/DL — SIGNIFICANT CHANGE UP (ref 70–99)
HCT VFR BLD CALC: 38 % — LOW (ref 39–50)
HGB BLD-MCNC: 11.8 G/DL — LOW (ref 13–17)
MCHC RBC-ENTMCNC: 30.3 PG — SIGNIFICANT CHANGE UP (ref 27–34)
MCHC RBC-ENTMCNC: 31.1 % — LOW (ref 32–36)
MCV RBC AUTO: 97.4 FL — SIGNIFICANT CHANGE UP (ref 80–100)
PLATELET # BLD AUTO: 347 K/UL — SIGNIFICANT CHANGE UP (ref 150–400)
PMV BLD: 10.8 FL — SIGNIFICANT CHANGE UP (ref 7–13)
POTASSIUM SERPL-MCNC: 3.8 MMOL/L — SIGNIFICANT CHANGE UP (ref 3.5–5.3)
POTASSIUM SERPL-SCNC: 3.8 MMOL/L — SIGNIFICANT CHANGE UP (ref 3.5–5.3)
RBC # BLD: 3.9 M/UL — LOW (ref 4.2–5.8)
RBC # FLD: 17 % — HIGH (ref 10.3–14.5)
SODIUM SERPL-SCNC: 142 MMOL/L — SIGNIFICANT CHANGE UP (ref 135–145)
WBC # BLD: 10.48 K/UL — SIGNIFICANT CHANGE UP (ref 3.8–10.5)
WBC # FLD AUTO: 10.48 K/UL — SIGNIFICANT CHANGE UP (ref 3.8–10.5)

## 2017-02-26 PROCEDURE — 99232 SBSQ HOSP IP/OBS MODERATE 35: CPT

## 2017-02-26 PROCEDURE — 99233 SBSQ HOSP IP/OBS HIGH 50: CPT

## 2017-02-26 RX ORDER — ACETAMINOPHEN 500 MG
650 TABLET ORAL EVERY 6 HOURS
Qty: 0 | Refills: 0 | Status: DISCONTINUED | OUTPATIENT
Start: 2017-02-26 | End: 2017-03-01

## 2017-02-26 RX ORDER — FUROSEMIDE 40 MG
60 TABLET ORAL
Qty: 0 | Refills: 0 | Status: DISCONTINUED | OUTPATIENT
Start: 2017-02-26 | End: 2017-02-27

## 2017-02-26 RX ADMIN — Medication 10 MILLIGRAM(S): at 05:17

## 2017-02-26 RX ADMIN — RIVAROXABAN 20 MILLIGRAM(S): KIT at 13:08

## 2017-02-26 RX ADMIN — Medication 80 MILLIGRAM(S): at 05:17

## 2017-02-26 RX ADMIN — MEXILETINE HYDROCHLORIDE 150 MILLIGRAM(S): 150 CAPSULE ORAL at 05:17

## 2017-02-26 RX ADMIN — VALSARTAN 120 MILLIGRAM(S): 80 TABLET ORAL at 05:17

## 2017-02-26 RX ADMIN — Medication 100 MILLIGRAM(S): at 05:18

## 2017-02-26 RX ADMIN — MEXILETINE HYDROCHLORIDE 150 MILLIGRAM(S): 150 CAPSULE ORAL at 13:08

## 2017-02-26 RX ADMIN — Medication 0.6 MILLIGRAM(S): at 13:08

## 2017-02-26 RX ADMIN — Medication 650 MILLIGRAM(S): at 12:30

## 2017-02-26 RX ADMIN — Medication 650 MILLIGRAM(S): at 11:39

## 2017-02-26 RX ADMIN — SPIRONOLACTONE 12.5 MILLIGRAM(S): 25 TABLET, FILM COATED ORAL at 05:18

## 2017-02-26 RX ADMIN — Medication 1 MILLIGRAM(S): at 13:08

## 2017-02-26 RX ADMIN — MEXILETINE HYDROCHLORIDE 150 MILLIGRAM(S): 150 CAPSULE ORAL at 21:03

## 2017-02-26 RX ADMIN — VALSARTAN 120 MILLIGRAM(S): 80 TABLET ORAL at 17:58

## 2017-02-27 LAB
BUN SERPL-MCNC: 50 MG/DL — HIGH (ref 7–23)
CALCIUM SERPL-MCNC: 8.7 MG/DL — SIGNIFICANT CHANGE UP (ref 8.4–10.5)
CHLORIDE SERPL-SCNC: 102 MMOL/L — SIGNIFICANT CHANGE UP (ref 98–107)
CO2 SERPL-SCNC: 20 MMOL/L — LOW (ref 22–31)
CREAT SERPL-MCNC: 1.34 MG/DL — HIGH (ref 0.5–1.3)
GLUCOSE SERPL-MCNC: 102 MG/DL — HIGH (ref 70–99)
HCT VFR BLD CALC: 37.3 % — LOW (ref 39–50)
HGB BLD-MCNC: 11.8 G/DL — LOW (ref 13–17)
MAGNESIUM SERPL-MCNC: 2.5 MG/DL — SIGNIFICANT CHANGE UP (ref 1.6–2.6)
MCHC RBC-ENTMCNC: 30.5 PG — SIGNIFICANT CHANGE UP (ref 27–34)
MCHC RBC-ENTMCNC: 31.6 % — LOW (ref 32–36)
MCV RBC AUTO: 96.4 FL — SIGNIFICANT CHANGE UP (ref 80–100)
PHOSPHATE SERPL-MCNC: 4.4 MG/DL — SIGNIFICANT CHANGE UP (ref 2.5–4.5)
PLATELET # BLD AUTO: 339 K/UL — SIGNIFICANT CHANGE UP (ref 150–400)
PMV BLD: 10.4 FL — SIGNIFICANT CHANGE UP (ref 7–13)
POTASSIUM SERPL-MCNC: 4.1 MMOL/L — SIGNIFICANT CHANGE UP (ref 3.5–5.3)
POTASSIUM SERPL-SCNC: 4.1 MMOL/L — SIGNIFICANT CHANGE UP (ref 3.5–5.3)
RBC # BLD: 3.87 M/UL — LOW (ref 4.2–5.8)
RBC # FLD: 16.8 % — HIGH (ref 10.3–14.5)
SODIUM SERPL-SCNC: 139 MMOL/L — SIGNIFICANT CHANGE UP (ref 135–145)
WBC # BLD: 9.7 K/UL — SIGNIFICANT CHANGE UP (ref 3.8–10.5)
WBC # FLD AUTO: 9.7 K/UL — SIGNIFICANT CHANGE UP (ref 3.8–10.5)

## 2017-02-27 PROCEDURE — 99232 SBSQ HOSP IP/OBS MODERATE 35: CPT

## 2017-02-27 PROCEDURE — 99233 SBSQ HOSP IP/OBS HIGH 50: CPT

## 2017-02-27 RX ADMIN — Medication 1 MILLIGRAM(S): at 12:45

## 2017-02-27 RX ADMIN — Medication 100 MILLIGRAM(S): at 08:54

## 2017-02-27 RX ADMIN — Medication 10 MILLIGRAM(S): at 05:18

## 2017-02-27 RX ADMIN — RIVAROXABAN 20 MILLIGRAM(S): KIT at 12:45

## 2017-02-27 RX ADMIN — Medication 60 MILLIGRAM(S): at 08:35

## 2017-02-27 RX ADMIN — VALSARTAN 120 MILLIGRAM(S): 80 TABLET ORAL at 17:54

## 2017-02-27 RX ADMIN — Medication 0.6 MILLIGRAM(S): at 12:45

## 2017-02-27 RX ADMIN — MEXILETINE HYDROCHLORIDE 150 MILLIGRAM(S): 150 CAPSULE ORAL at 12:45

## 2017-02-27 RX ADMIN — MEXILETINE HYDROCHLORIDE 150 MILLIGRAM(S): 150 CAPSULE ORAL at 21:54

## 2017-02-27 RX ADMIN — MEXILETINE HYDROCHLORIDE 150 MILLIGRAM(S): 150 CAPSULE ORAL at 05:18

## 2017-02-27 NOTE — PROVIDER CONTACT NOTE (OTHER) - ASSESSMENT
Pt is alert and orientated x 4. He denies chest pain ,SOB or dizziness. He shows no signs of  distress or discomfort.

## 2017-02-28 ENCOUNTER — TRANSCRIPTION ENCOUNTER (OUTPATIENT)
Age: 67
End: 2017-02-28

## 2017-02-28 LAB
BUN SERPL-MCNC: 47 MG/DL — HIGH (ref 7–23)
CALCIUM SERPL-MCNC: 8.5 MG/DL — SIGNIFICANT CHANGE UP (ref 8.4–10.5)
CHLORIDE SERPL-SCNC: 103 MMOL/L — SIGNIFICANT CHANGE UP (ref 98–107)
CO2 SERPL-SCNC: 20 MMOL/L — LOW (ref 22–31)
CREAT SERPL-MCNC: 1.1 MG/DL — SIGNIFICANT CHANGE UP (ref 0.5–1.3)
GLUCOSE SERPL-MCNC: 107 MG/DL — HIGH (ref 70–99)
HCT VFR BLD CALC: 37.6 % — LOW (ref 39–50)
HGB BLD-MCNC: 11.9 G/DL — LOW (ref 13–17)
MAGNESIUM SERPL-MCNC: 2.8 MG/DL — HIGH (ref 1.6–2.6)
MCHC RBC-ENTMCNC: 30.6 PG — SIGNIFICANT CHANGE UP (ref 27–34)
MCHC RBC-ENTMCNC: 31.6 % — LOW (ref 32–36)
MCV RBC AUTO: 96.7 FL — SIGNIFICANT CHANGE UP (ref 80–100)
PLATELET # BLD AUTO: 345 K/UL — SIGNIFICANT CHANGE UP (ref 150–400)
PMV BLD: 10.7 FL — SIGNIFICANT CHANGE UP (ref 7–13)
POTASSIUM SERPL-MCNC: 4.1 MMOL/L — SIGNIFICANT CHANGE UP (ref 3.5–5.3)
POTASSIUM SERPL-SCNC: 4.1 MMOL/L — SIGNIFICANT CHANGE UP (ref 3.5–5.3)
RBC # BLD: 3.89 M/UL — LOW (ref 4.2–5.8)
RBC # FLD: 17 % — HIGH (ref 10.3–14.5)
SODIUM SERPL-SCNC: 139 MMOL/L — SIGNIFICANT CHANGE UP (ref 135–145)
WBC # BLD: 8.76 K/UL — SIGNIFICANT CHANGE UP (ref 3.8–10.5)
WBC # FLD AUTO: 8.76 K/UL — SIGNIFICANT CHANGE UP (ref 3.8–10.5)

## 2017-02-28 PROCEDURE — 99232 SBSQ HOSP IP/OBS MODERATE 35: CPT

## 2017-02-28 PROCEDURE — 99233 SBSQ HOSP IP/OBS HIGH 50: CPT

## 2017-02-28 RX ORDER — FUROSEMIDE 40 MG
40 TABLET ORAL DAILY
Qty: 0 | Refills: 0 | Status: DISCONTINUED | OUTPATIENT
Start: 2017-03-01 | End: 2017-03-01

## 2017-02-28 RX ORDER — VALSARTAN 80 MG/1
120 TABLET ORAL DAILY
Qty: 0 | Refills: 0 | Status: DISCONTINUED | OUTPATIENT
Start: 2017-03-01 | End: 2017-03-01

## 2017-02-28 RX ADMIN — Medication 0.6 MILLIGRAM(S): at 13:06

## 2017-02-28 RX ADMIN — MEXILETINE HYDROCHLORIDE 150 MILLIGRAM(S): 150 CAPSULE ORAL at 05:57

## 2017-02-28 RX ADMIN — Medication 100 MILLIGRAM(S): at 05:57

## 2017-02-28 RX ADMIN — Medication 650 MILLIGRAM(S): at 15:47

## 2017-02-28 RX ADMIN — Medication 650 MILLIGRAM(S): at 16:25

## 2017-02-28 RX ADMIN — Medication 1 MILLIGRAM(S): at 13:06

## 2017-02-28 RX ADMIN — MEXILETINE HYDROCHLORIDE 150 MILLIGRAM(S): 150 CAPSULE ORAL at 13:06

## 2017-02-28 RX ADMIN — SPIRONOLACTONE 12.5 MILLIGRAM(S): 25 TABLET, FILM COATED ORAL at 05:57

## 2017-02-28 RX ADMIN — VALSARTAN 120 MILLIGRAM(S): 80 TABLET ORAL at 05:57

## 2017-02-28 RX ADMIN — RIVAROXABAN 20 MILLIGRAM(S): KIT at 13:06

## 2017-02-28 RX ADMIN — MEXILETINE HYDROCHLORIDE 150 MILLIGRAM(S): 150 CAPSULE ORAL at 22:07

## 2017-03-01 VITALS
RESPIRATION RATE: 17 BRPM | TEMPERATURE: 97 F | HEART RATE: 77 BPM | SYSTOLIC BLOOD PRESSURE: 101 MMHG | OXYGEN SATURATION: 100 % | DIASTOLIC BLOOD PRESSURE: 75 MMHG

## 2017-03-01 LAB — BACTERIA FLD CULT: SIGNIFICANT CHANGE UP

## 2017-03-01 PROCEDURE — 99239 HOSP IP/OBS DSCHRG MGMT >30: CPT

## 2017-03-01 RX ORDER — SPIRONOLACTONE 25 MG/1
0.5 TABLET, FILM COATED ORAL
Qty: 15 | Refills: 0 | OUTPATIENT
Start: 2017-03-01 | End: 2017-03-31

## 2017-03-01 RX ORDER — VALSARTAN 80 MG/1
1 TABLET ORAL
Qty: 90 | Refills: 0 | COMMUNITY
Start: 2017-03-01 | End: 2017-03-31

## 2017-03-01 RX ORDER — MEXILETINE HYDROCHLORIDE 150 MG/1
1 CAPSULE ORAL
Qty: 90 | Refills: 0 | OUTPATIENT
Start: 2017-03-01 | End: 2017-03-31

## 2017-03-01 RX ORDER — METOPROLOL TARTRATE 50 MG
1 TABLET ORAL
Qty: 30 | Refills: 0 | OUTPATIENT
Start: 2017-03-01 | End: 2017-03-31

## 2017-03-01 RX ORDER — COLCHICINE 0.6 MG
1 TABLET ORAL
Qty: 30 | Refills: 0 | OUTPATIENT
Start: 2017-03-01 | End: 2017-03-31

## 2017-03-01 RX ORDER — CARVEDILOL PHOSPHATE 80 MG/1
1 CAPSULE, EXTENDED RELEASE ORAL
Qty: 0 | Refills: 0 | COMMUNITY

## 2017-03-01 RX ORDER — VALSARTAN 80 MG/1
3 TABLET ORAL
Qty: 90 | Refills: 0 | OUTPATIENT
Start: 2017-03-01 | End: 2017-03-31

## 2017-03-01 RX ORDER — FUROSEMIDE 40 MG
1 TABLET ORAL
Qty: 30 | Refills: 0 | OUTPATIENT
Start: 2017-03-01 | End: 2017-03-31

## 2017-03-01 RX ADMIN — Medication 0.6 MILLIGRAM(S): at 11:19

## 2017-03-01 RX ADMIN — SPIRONOLACTONE 12.5 MILLIGRAM(S): 25 TABLET, FILM COATED ORAL at 06:22

## 2017-03-01 RX ADMIN — VALSARTAN 120 MILLIGRAM(S): 80 TABLET ORAL at 06:23

## 2017-03-01 RX ADMIN — Medication 100 MILLIGRAM(S): at 06:23

## 2017-03-01 RX ADMIN — Medication 40 MILLIGRAM(S): at 06:28

## 2017-03-01 RX ADMIN — Medication 1 MILLIGRAM(S): at 11:19

## 2017-03-01 RX ADMIN — MEXILETINE HYDROCHLORIDE 150 MILLIGRAM(S): 150 CAPSULE ORAL at 06:22

## 2017-03-01 NOTE — DISCHARGE NOTE ADULT - HOSPITAL COURSE
65 yo M with pmhx of CHF EF 23%, HTN, AICD, Afib on xeralto, presents to the ED status post syncopal episode outside his home yesterday. Pt went to Dr. Kessler in the clinic yesterday but the clinic was closed so he returned to the ED today. Pt admits that he felt dizzy, lightheaded and SOB prior to the episode, but denies LOC or head trauma. PT states that at his baseline he can walk 1 block before becoming SOB. However, for past 2 days pt developed dyspnea with minimal exertion such as walking to the bathroom or dressing up. Pt admitted to eating chinese food and cold cuts for past 2 days. Yesterday the pt did not stop to rest while walking, felt dizzy, lightheaded, and next thing he remembers he was lying on the ground with bystanders helping him to get up. Pt reports he landed on his R wrist and R knee during the fall. Pt states that the R wrist and knee feel swollen and tender. Pt states that he feels his legs are more swollen than usual. Pt denies currently SOB, dizziness, CP, palpitations, HA, fever, chills, N/V/D, abdominal pain or bloating.     Hospital Course   Device interrogation showed VF arrest/episodes of VT which caused syncope and patient was started on mexilitine. patient was diuresed via IV lasix and was seen by HF team. IV lasix then changed to PO lasix.   CXR: Clear lungs. No pleural effusions or pneumothorax. Stable left chest wall AICD and cardiac and mediastinal silhouettes. Trachea midline. Generalized osteopenia, spinal degenerative changes, and right shoulder rotator cuff arthropathy. No acute or focally aggressive appearing osseous abnormalities.  X-ray right hand: Diffuse soft tissue swelling. No tracking soft tissue gas collections, radiopaque foreign bodies, or gross radiographic evidence for osteomyelitis. No fractures or dislocations. Advanced radioscaphoid osteoarthritic change. 1st MCP osteoarthritic   change with associated joint subluxation. Additional osteoarthritic changes involving the thumb IP 2nd PIP and 3rd DIP joints. Preserved remaining visualized joint spaces and no joint margin erosions. Preserved joint spaces and no joint margin erosions. Carpal bones normally aligned. Neutral ulnar variance. No lytic or blastic lesions.  X-ray right knee: Large knee joint effusion. No dislocations or acute appearing fractures. Moderately advanced bilateral knee tricompartment osteoarthritis with notable medially downsloping medial tibial plateau mechanical remodeling deformity. Degenerative osseous bodies also noted in medial suprapatellar region compatible with secondary synovial osteochondromatosis. Generalized osteopenia otherwise no discrete lytic or blastic lesions. Scant vascular calcifications.    Rheumatology saw patient for hand pain and recommended trial of steroids as no significant improvement with colchicine. Uric acid lowering treatment to be started as outpt. Try Prednisone 20mg x 3 days, 15mg x 3 days, 10mg x 3 days, 5mg x 3 days. Patient also had some NORMAN during ghospitalization, which improved. PT recommended REhab, but patient refused rehab and wants to go home with home care services.     INCOMPLETE 67 yo M with pmhx of CHF EF 23%, HTN, AICD, Afib on xeralto, presents to the ED status post syncopal episode outside his home yesterday. Pt went to Dr. Kessler in the clinic yesterday but the clinic was closed so he returned to the ED today. Pt admits that he felt dizzy, lightheaded and SOB prior to the episode, but denies LOC or head trauma. PT states that at his baseline he can walk 1 block before becoming SOB. However, for past 2 days pt developed dyspnea with minimal exertion such as walking to the bathroom or dressing up. Pt admitted to eating chinese food and cold cuts for past 2 days. Yesterday the pt did not stop to rest while walking, felt dizzy, lightheaded, and next thing he remembers he was lying on the ground with bystanders helping him to get up. Pt reports he landed on his R wrist and R knee during the fall. Pt states that the R wrist and knee feel swollen and tender. Pt states that he feels his legs are more swollen than usual. Pt denies currently SOB, dizziness, CP, palpitations, HA, fever, chills, N/V/D, abdominal pain or bloating.     Hospital Course   Device interrogation showed VF arrest/episodes of VT which caused syncope and patient was started on mexilitine. patient was diuresed via IV lasix and was seen by HF team. IV lasix then changed to PO lasix.   CXR: Clear lungs. No pleural effusions or pneumothorax. Stable left chest wall AICD and cardiac and mediastinal silhouettes. Trachea midline. Generalized osteopenia, spinal degenerative changes, and right shoulder rotator cuff arthropathy. No acute or focally aggressive appearing osseous abnormalities.  X-ray right hand: Diffuse soft tissue swelling. No tracking soft tissue gas collections, radiopaque foreign bodies, or gross radiographic evidence for osteomyelitis. No fractures or dislocations. Advanced radioscaphoid osteoarthritic change. 1st MCP osteoarthritic   change with associated joint subluxation. Additional osteoarthritic changes involving the thumb IP 2nd PIP and 3rd DIP joints. Preserved remaining visualized joint spaces and no joint margin erosions. Preserved joint spaces and no joint margin erosions. Carpal bones normally aligned. Neutral ulnar variance. No lytic or blastic lesions.  X-ray right knee: Large knee joint effusion. No dislocations or acute appearing fractures. Moderately advanced bilateral knee tricompartment osteoarthritis with notable medially downsloping medial tibial plateau mechanical remodeling deformity. Degenerative osseous bodies also noted in medial suprapatellar region compatible with secondary synovial osteochondromatosis. Generalized osteopenia otherwise no discrete lytic or blastic lesions. Scant vascular calcifications.    Rheumatology saw patient for hand pain and recommended trial of steroids as no significant improvement with colchicine. Uric acid lowering treatment to be started as outpt. Try Prednisone 20mg x 3 days, 15mg x 3 days, 10mg x 3 days, 5mg x 3 days. Patient also had some NORMAN during ghospitalization, which improved. PT recommended REhab, but patient refused rehab and wants to go home with home care services.     Patient was discharged home.

## 2017-03-01 NOTE — DISCHARGE NOTE ADULT - CARE PROVIDER_API CALL
Júnior Hurtado), Adv [facility]  [address] [place] [address]  Phone: [phone]  Fax: [phone]

## 2017-03-01 NOTE — DISCHARGE NOTE ADULT - MEDICATION SUMMARY - MEDICATIONS TO STOP TAKING
I will STOP taking the medications listed below when I get home from the hospital:    carvedilol 12.5 mg oral tablet  -- 1 tab(s) by mouth 2 times a day    enalapril 10 mg oral tablet  -- 1 tab(s) by mouth once a day

## 2017-03-01 NOTE — DISCHARGE NOTE ADULT - MEDICATION SUMMARY - MEDICATIONS TO TAKE
I will START or STAY ON the medications listed below when I get home from the hospital:    spironolactone 25 mg oral tablet  -- 0.5 tab(s) by mouth once a day  -- Indication: For Congestive heart failure    valsartan 40 mg oral tablet  -- 3 tab(s) by mouth once a day  -- Indication: For Hypertension    mexiletine 150 mg oral capsule  -- 1 cap(s) by mouth 3 times a day  -- Indication: For Ventricular fibrillation    Xarelto 20 mg oral tablet  -- 1 tab(s) by mouth once a day (in the evening)  -- Indication: For Atrial fibrillation    colchicine 0.6 mg oral tablet  -- 1 tab(s) by mouth once a day  -- Indication: For Gout    metoprolol succinate 100 mg oral tablet, extended release  -- 1 tab(s) by mouth once a day  -- Indication: For Hypertension    furosemide 40 mg oral tablet  -- 1 tab(s) by mouth once a day  -- Indication: For Congestive heart failure    folic acid 1 mg oral tablet  -- 1 tab(s) by mouth once a day  -- Indication: For Supplement

## 2017-03-01 NOTE — DISCHARGE NOTE ADULT - MEDICATION SUMMARY - MEDICATIONS TO CHANGE
I will SWITCH the dose or number of times a day I take the medications listed below when I get home from the hospital:    Lasix 80 mg oral tablet  -- 1 tab(s) by mouth once a day    valsartan 40 mg oral tablet  -- 2 tab(s) by mouth 2 times a day

## 2017-03-01 NOTE — DISCHARGE NOTE ADULT - SECONDARY DIAGNOSIS.
Syncope Hypertension Essential hypertension Acute gout of right knee, unspecified cause Ventricular fibrillation

## 2017-03-01 NOTE — DISCHARGE NOTE ADULT - PLAN OF CARE
Continue medications as currently prescribed. Follow up with your PMD for further management of disease. Dash diet. Prevent repeat heart failure exacerbations Please follow up with Dr. Hurtado in 1-2 weeks, his information is provided below. Please call for an appointment.  Continue your medications as prescribed. Please adhere to a heart failure friendly diet as described above. If you develop any new or worsening shortness of breath, swelling, or chest pain then please call your doctor or return to the emergency room. Prevent repeat events Syncope was likely caused by ventricular fibrillation which terminated with an ICD shock.  Medications were adjusted to help prevent repeat events. The likely cause of syncopal event. This was likely a result of your heart failure exacerbation. Continue your medications. Knee pain was likely caused by acute gout flair. Continue to take colchicine as prescribed.

## 2017-03-01 NOTE — DISCHARGE NOTE ADULT - CARE PLAN
Principal Discharge DX:	Acute on chronic systolic (congestive) heart failure  Secondary Diagnosis:	Syncope  Secondary Diagnosis:	Hypertension Principal Discharge DX:	Acute on chronic systolic (congestive) heart failure  Goal:	Prevent repeat heart failure exacerbations  Instructions for follow-up, activity and diet:	Please follow up with Dr. Hurtado in 1-2 weeks, his information is provided below. Please call for an appointment.  Continue your medications as prescribed. Please adhere to a heart failure friendly diet as described above. If you develop any new or worsening shortness of breath, swelling, or chest pain then please call your doctor or return to the emergency room.  Secondary Diagnosis:	Syncope  Goal:	Prevent repeat events  Instructions for follow-up, activity and diet:	Syncope was likely caused by ventricular fibrillation which terminated with an ICD shock.  Medications were adjusted to help prevent repeat events.  Secondary Diagnosis:	Ventricular fibrillation  Instructions for follow-up, activity and diet:	The likely cause of syncopal event. This was likely a result of your heart failure exacerbation. Continue your medications.  Secondary Diagnosis:	Essential hypertension  Instructions for follow-up, activity and diet:	Continue medications as currently prescribed. Follow up with your PMD for further management of disease. Dash diet.  Secondary Diagnosis:	Acute gout of right knee, unspecified cause  Instructions for follow-up, activity and diet:	Knee pain was likely caused by acute gout flair. Continue to take colchicine as prescribed.

## 2017-03-01 NOTE — DISCHARGE NOTE ADULT - CARE PROVIDERS DIRECT ADDRESSES
[email or web address]

## 2017-03-01 NOTE — DISCHARGE NOTE ADULT - PATIENT PORTAL LINK FT
“You can access the Follow[employer] Patient Portal, offered by [facility], by registering with the following website: [email or web address]”

## 2017-03-03 RX ORDER — MEXILETINE HYDROCHLORIDE 150 MG/1
1 CAPSULE ORAL
Qty: 0 | Refills: 0 | COMMUNITY
Start: 2017-03-03

## 2017-03-15 ENCOUNTER — APPOINTMENT (OUTPATIENT)
Dept: CARDIOLOGY | Facility: CLINIC | Age: 67
End: 2017-03-15

## 2017-03-23 ENCOUNTER — APPOINTMENT (OUTPATIENT)
Dept: CARDIOLOGY | Facility: CLINIC | Age: 67
End: 2017-03-23

## 2017-03-23 VITALS
SYSTOLIC BLOOD PRESSURE: 94 MMHG | OXYGEN SATURATION: 98 % | RESPIRATION RATE: 18 BRPM | DIASTOLIC BLOOD PRESSURE: 65 MMHG | HEIGHT: 68 IN | BODY MASS INDEX: 24.55 KG/M2 | HEART RATE: 70 BPM | WEIGHT: 162 LBS

## 2017-03-24 ENCOUNTER — RX RENEWAL (OUTPATIENT)
Age: 67
End: 2017-03-24

## 2017-03-28 ENCOUNTER — MEDICATION RENEWAL (OUTPATIENT)
Age: 67
End: 2017-03-28

## 2017-03-30 ENCOUNTER — INPATIENT (INPATIENT)
Facility: HOSPITAL | Age: 67
LOS: 15 days | Discharge: HOME | End: 2017-04-15
Attending: INTERNAL MEDICINE | Admitting: INTERNAL MEDICINE
Payer: MEDICARE

## 2017-03-30 VITALS
DIASTOLIC BLOOD PRESSURE: 60 MMHG | HEART RATE: 100 BPM | OXYGEN SATURATION: 100 % | RESPIRATION RATE: 20 BRPM | SYSTOLIC BLOOD PRESSURE: 98 MMHG | TEMPERATURE: 98 F

## 2017-03-30 DIAGNOSIS — Z45.02 ENCOUNTER FOR ADJUSTMENT AND MANAGEMENT OF AUTOMATIC IMPLANTABLE CARDIAC DEFIBRILLATOR: ICD-10-CM

## 2017-03-30 LAB
ALBUMIN SERPL ELPH-MCNC: 3.4 G/DL — SIGNIFICANT CHANGE UP (ref 3.3–5)
ALP SERPL-CCNC: 80 U/L — SIGNIFICANT CHANGE UP (ref 40–120)
ALT FLD-CCNC: 37 U/L — SIGNIFICANT CHANGE UP (ref 4–41)
AST SERPL-CCNC: 44 U/L — HIGH (ref 4–40)
BASOPHILS # BLD AUTO: 0.01 K/UL — SIGNIFICANT CHANGE UP (ref 0–0.2)
BASOPHILS NFR BLD AUTO: 0.1 % — SIGNIFICANT CHANGE UP (ref 0–2)
BILIRUB SERPL-MCNC: 1.3 MG/DL — HIGH (ref 0.2–1.2)
BUN SERPL-MCNC: 20 MG/DL — SIGNIFICANT CHANGE UP (ref 7–23)
CALCIUM SERPL-MCNC: 9.1 MG/DL — SIGNIFICANT CHANGE UP (ref 8.4–10.5)
CHLORIDE SERPL-SCNC: 102 MMOL/L — SIGNIFICANT CHANGE UP (ref 98–107)
CK MB BLD-MCNC: 1.75 NG/ML — SIGNIFICANT CHANGE UP (ref 1–6.6)
CK SERPL-CCNC: 82 U/L — SIGNIFICANT CHANGE UP (ref 30–200)
CO2 SERPL-SCNC: 23 MMOL/L — SIGNIFICANT CHANGE UP (ref 22–31)
CREAT SERPL-MCNC: 1.14 MG/DL — SIGNIFICANT CHANGE UP (ref 0.5–1.3)
EOSINOPHIL # BLD AUTO: 0.04 K/UL — SIGNIFICANT CHANGE UP (ref 0–0.5)
EOSINOPHIL NFR BLD AUTO: 0.5 % — SIGNIFICANT CHANGE UP (ref 0–6)
GLUCOSE SERPL-MCNC: 86 MG/DL — SIGNIFICANT CHANGE UP (ref 70–99)
HCT VFR BLD CALC: 41.6 % — SIGNIFICANT CHANGE UP (ref 39–50)
HGB BLD-MCNC: 13.6 G/DL — SIGNIFICANT CHANGE UP (ref 13–17)
IMM GRANULOCYTES NFR BLD AUTO: 0.3 % — SIGNIFICANT CHANGE UP (ref 0–1.5)
LYMPHOCYTES # BLD AUTO: 1.67 K/UL — SIGNIFICANT CHANGE UP (ref 1–3.3)
LYMPHOCYTES # BLD AUTO: 21.9 % — SIGNIFICANT CHANGE UP (ref 13–44)
MAGNESIUM SERPL-MCNC: 2 MG/DL — SIGNIFICANT CHANGE UP (ref 1.6–2.6)
MCHC RBC-ENTMCNC: 30.6 PG — SIGNIFICANT CHANGE UP (ref 27–34)
MCHC RBC-ENTMCNC: 32.7 % — SIGNIFICANT CHANGE UP (ref 32–36)
MCV RBC AUTO: 93.7 FL — SIGNIFICANT CHANGE UP (ref 80–100)
MONOCYTES # BLD AUTO: 0.67 K/UL — SIGNIFICANT CHANGE UP (ref 0–0.9)
MONOCYTES NFR BLD AUTO: 8.8 % — SIGNIFICANT CHANGE UP (ref 2–14)
NEUTROPHILS # BLD AUTO: 5.23 K/UL — SIGNIFICANT CHANGE UP (ref 1.8–7.4)
NEUTROPHILS NFR BLD AUTO: 68.4 % — SIGNIFICANT CHANGE UP (ref 43–77)
PHOSPHATE SERPL-MCNC: 4.2 MG/DL — SIGNIFICANT CHANGE UP (ref 2.5–4.5)
PHOSPHATE SERPL-MCNC: 9.3 MG/DL — HIGH (ref 2.5–4.5)
PLATELET # BLD AUTO: 265 K/UL — SIGNIFICANT CHANGE UP (ref 150–400)
PMV BLD: 11.9 FL — SIGNIFICANT CHANGE UP (ref 7–13)
POTASSIUM SERPL-MCNC: 5.1 MMOL/L — SIGNIFICANT CHANGE UP (ref 3.5–5.3)
POTASSIUM SERPL-SCNC: 5.1 MMOL/L — SIGNIFICANT CHANGE UP (ref 3.5–5.3)
PROT SERPL-MCNC: 7.1 G/DL — SIGNIFICANT CHANGE UP (ref 6–8.3)
RBC # BLD: 4.44 M/UL — SIGNIFICANT CHANGE UP (ref 4.2–5.8)
RBC # FLD: 18 % — HIGH (ref 10.3–14.5)
SODIUM SERPL-SCNC: 143 MMOL/L — SIGNIFICANT CHANGE UP (ref 135–145)
TROPONIN T SERPL-MCNC: < 0.06 NG/ML — SIGNIFICANT CHANGE UP (ref 0–0.06)
WBC # BLD: 7.64 K/UL — SIGNIFICANT CHANGE UP (ref 3.8–10.5)
WBC # FLD AUTO: 7.64 K/UL — SIGNIFICANT CHANGE UP (ref 3.8–10.5)

## 2017-03-30 PROCEDURE — 71020: CPT | Mod: 26

## 2017-03-30 RX ORDER — VALSARTAN 80 MG/1
40 TABLET ORAL
Qty: 0 | Refills: 0 | Status: DISCONTINUED | OUTPATIENT
Start: 2017-03-30 | End: 2017-04-02

## 2017-03-30 RX ORDER — FOLIC ACID 0.8 MG
1 TABLET ORAL DAILY
Qty: 0 | Refills: 0 | Status: DISCONTINUED | OUTPATIENT
Start: 2017-03-30 | End: 2017-04-15

## 2017-03-30 RX ORDER — SPIRONOLACTONE 25 MG/1
12.5 TABLET, FILM COATED ORAL DAILY
Qty: 0 | Refills: 0 | Status: DISCONTINUED | OUTPATIENT
Start: 2017-03-30 | End: 2017-04-02

## 2017-03-30 RX ORDER — COLCHICINE 0.6 MG
0.6 TABLET ORAL DAILY
Qty: 0 | Refills: 0 | Status: DISCONTINUED | OUTPATIENT
Start: 2017-03-30 | End: 2017-04-03

## 2017-03-30 RX ORDER — MEXILETINE HYDROCHLORIDE 150 MG/1
150 CAPSULE ORAL THREE TIMES A DAY
Qty: 0 | Refills: 0 | Status: DISCONTINUED | OUTPATIENT
Start: 2017-03-30 | End: 2017-04-03

## 2017-03-30 RX ORDER — RIVAROXABAN 15 MG-20MG
20 KIT ORAL DAILY
Qty: 0 | Refills: 0 | Status: DISCONTINUED | OUTPATIENT
Start: 2017-03-30 | End: 2017-04-04

## 2017-03-30 RX ORDER — FUROSEMIDE 40 MG
40 TABLET ORAL DAILY
Qty: 0 | Refills: 0 | Status: DISCONTINUED | OUTPATIENT
Start: 2017-03-30 | End: 2017-04-02

## 2017-03-30 RX ORDER — METOPROLOL TARTRATE 50 MG
50 TABLET ORAL
Qty: 0 | Refills: 0 | Status: DISCONTINUED | OUTPATIENT
Start: 2017-03-30 | End: 2017-03-31

## 2017-03-30 RX ADMIN — Medication 30 MILLILITER(S): at 21:36

## 2017-03-30 NOTE — ED PROVIDER NOTE - PSH
AICD (Automatic Cardioverter/Defibrillator) Present  [employer] with 1 [employer] lead4/1/09- explanted and replaced with [employer] 2 leads on 9/2/09  History of Prior Ablation Treatment    Status post left hip replacement

## 2017-03-30 NOTE — ED ADULT NURSE REASSESSMENT NOTE - NS ED NURSE REASSESS COMMENT FT1
interrogation of aicd in progress. Runs of PVC's noted on cardiac monitor.
RN Break Coverage: Alert and oriented x 4. VSS. Pt denies pain .Will continue to monitor.

## 2017-03-30 NOTE — ED PROVIDER NOTE - PHYSICAL EXAMINATION
Attending Note: 67 y/o male presents to the ED s/p AICD firing this PM x 1 ~1800. PMH CHF, AICD, a-fib (Rx Xarelto). Pt felt mildly dizzy all day. No chest pain, palpitations, syncope, N/V, SOB, recent cough/illness, abdominal pain. Pt feels fine now.

## 2017-03-30 NOTE — ED PROVIDER NOTE - OBJECTIVE STATEMENT
65 y/o M with h/o CHF, AICD, a-fib on xarelto presents with dizziness and AICD fired. Fired around 6pm tonight. Pt felt mildly dizzy all day. No chest pain, SOB, recent cough/illness, abdominal pain, nausea. Pt feels fine now. No palpitations.

## 2017-03-30 NOTE — ED ADULT TRIAGE NOTE - CHIEF COMPLAINT QUOTE
Pt awake and alert , states his AICD went off about 6pm. Pt now denies sob or chest . Pt denies feeling light headed or dizzy.

## 2017-03-31 DIAGNOSIS — I10 ESSENTIAL (PRIMARY) HYPERTENSION: ICD-10-CM

## 2017-03-31 DIAGNOSIS — Z41.8 ENCOUNTER FOR OTHER PROCEDURES FOR PURPOSES OTHER THAN REMEDYING HEALTH STATE: ICD-10-CM

## 2017-03-31 DIAGNOSIS — Z45.02 ENCOUNTER FOR ADJUSTMENT AND MANAGEMENT OF AUTOMATIC IMPLANTABLE CARDIAC DEFIBRILLATOR: ICD-10-CM

## 2017-03-31 DIAGNOSIS — I50.9 HEART FAILURE, UNSPECIFIED: ICD-10-CM

## 2017-03-31 LAB
BUN SERPL-MCNC: 19 MG/DL — SIGNIFICANT CHANGE UP (ref 7–23)
BUN SERPL-MCNC: 20 MG/DL — SIGNIFICANT CHANGE UP (ref 7–23)
CALCIUM SERPL-MCNC: 8.9 MG/DL — SIGNIFICANT CHANGE UP (ref 8.4–10.5)
CALCIUM SERPL-MCNC: 9.1 MG/DL — SIGNIFICANT CHANGE UP (ref 8.4–10.5)
CHLORIDE SERPL-SCNC: 100 MMOL/L — SIGNIFICANT CHANGE UP (ref 98–107)
CHLORIDE SERPL-SCNC: 98 MMOL/L — SIGNIFICANT CHANGE UP (ref 98–107)
CHOLEST SERPL-MCNC: 39 MG/DL — LOW (ref 120–199)
CK MB BLD-MCNC: 1.67 NG/ML — SIGNIFICANT CHANGE UP (ref 1–6.6)
CK SERPL-CCNC: 65 U/L — SIGNIFICANT CHANGE UP (ref 30–200)
CO2 SERPL-SCNC: 21 MMOL/L — LOW (ref 22–31)
CO2 SERPL-SCNC: 23 MMOL/L — SIGNIFICANT CHANGE UP (ref 22–31)
CREAT SERPL-MCNC: 1.06 MG/DL — SIGNIFICANT CHANGE UP (ref 0.5–1.3)
CREAT SERPL-MCNC: 1.08 MG/DL — SIGNIFICANT CHANGE UP (ref 0.5–1.3)
GLUCOSE SERPL-MCNC: 78 MG/DL — SIGNIFICANT CHANGE UP (ref 70–99)
GLUCOSE SERPL-MCNC: 86 MG/DL — SIGNIFICANT CHANGE UP (ref 70–99)
HBA1C BLD-MCNC: 5.5 % — SIGNIFICANT CHANGE UP (ref 4–5.6)
HCT VFR BLD CALC: 37.8 % — LOW (ref 39–50)
HDLC SERPL-MCNC: 25 MG/DL — LOW (ref 35–55)
HGB BLD-MCNC: 12.4 G/DL — LOW (ref 13–17)
LIPID PNL WITH DIRECT LDL SERPL: 34 MG/DL — SIGNIFICANT CHANGE UP
MAGNESIUM SERPL-MCNC: 2 MG/DL — SIGNIFICANT CHANGE UP (ref 1.6–2.6)
MAGNESIUM SERPL-MCNC: 3.7 MG/DL — HIGH (ref 1.6–2.6)
MCHC RBC-ENTMCNC: 30.5 PG — SIGNIFICANT CHANGE UP (ref 27–34)
MCHC RBC-ENTMCNC: 32.8 % — SIGNIFICANT CHANGE UP (ref 32–36)
MCV RBC AUTO: 92.9 FL — SIGNIFICANT CHANGE UP (ref 80–100)
PHOSPHATE SERPL-MCNC: 3.1 MG/DL — SIGNIFICANT CHANGE UP (ref 2.5–4.5)
PLATELET # BLD AUTO: 256 K/UL — SIGNIFICANT CHANGE UP (ref 150–400)
PMV BLD: 11 FL — SIGNIFICANT CHANGE UP (ref 7–13)
POTASSIUM SERPL-MCNC: 3.9 MMOL/L — SIGNIFICANT CHANGE UP (ref 3.5–5.3)
POTASSIUM SERPL-MCNC: 4 MMOL/L — SIGNIFICANT CHANGE UP (ref 3.5–5.3)
POTASSIUM SERPL-SCNC: 3.9 MMOL/L — SIGNIFICANT CHANGE UP (ref 3.5–5.3)
POTASSIUM SERPL-SCNC: 4 MMOL/L — SIGNIFICANT CHANGE UP (ref 3.5–5.3)
RBC # BLD: 4.07 M/UL — LOW (ref 4.2–5.8)
RBC # FLD: 17.7 % — HIGH (ref 10.3–14.5)
SODIUM SERPL-SCNC: 135 MMOL/L — SIGNIFICANT CHANGE UP (ref 135–145)
SODIUM SERPL-SCNC: 137 MMOL/L — SIGNIFICANT CHANGE UP (ref 135–145)
TRIGL SERPL-MCNC: 62 MG/DL — SIGNIFICANT CHANGE UP (ref 10–149)
TROPONIN T SERPL-MCNC: < 0.06 NG/ML — SIGNIFICANT CHANGE UP (ref 0–0.06)
TSH SERPL-MCNC: 1.12 UIU/ML — SIGNIFICANT CHANGE UP (ref 0.27–4.2)
WBC # BLD: 6.75 K/UL — SIGNIFICANT CHANGE UP (ref 3.8–10.5)
WBC # FLD AUTO: 6.75 K/UL — SIGNIFICANT CHANGE UP (ref 3.8–10.5)

## 2017-03-31 PROCEDURE — 99232 SBSQ HOSP IP/OBS MODERATE 35: CPT

## 2017-03-31 PROCEDURE — 93010 ELECTROCARDIOGRAM REPORT: CPT

## 2017-03-31 PROCEDURE — 99222 1ST HOSP IP/OBS MODERATE 55: CPT

## 2017-03-31 RX ORDER — METOPROLOL TARTRATE 50 MG
25 TABLET ORAL ONCE
Qty: 0 | Refills: 0 | Status: COMPLETED | OUTPATIENT
Start: 2017-03-31 | End: 2017-03-31

## 2017-03-31 RX ORDER — METOPROLOL TARTRATE 50 MG
50 TABLET ORAL
Qty: 0 | Refills: 0 | Status: DISCONTINUED | OUTPATIENT
Start: 2017-03-31 | End: 2017-04-05

## 2017-03-31 RX ORDER — SENNA PLUS 8.6 MG/1
2 TABLET ORAL AT BEDTIME
Qty: 0 | Refills: 0 | Status: DISCONTINUED | OUTPATIENT
Start: 2017-03-31 | End: 2017-04-15

## 2017-03-31 RX ORDER — POTASSIUM CHLORIDE 20 MEQ
20 PACKET (EA) ORAL ONCE
Qty: 0 | Refills: 0 | Status: COMPLETED | OUTPATIENT
Start: 2017-03-31 | End: 2017-03-31

## 2017-03-31 RX ORDER — MAGNESIUM SULFATE 500 MG/ML
2 VIAL (ML) INJECTION ONCE
Qty: 0 | Refills: 0 | Status: COMPLETED | OUTPATIENT
Start: 2017-03-31 | End: 2017-03-31

## 2017-03-31 RX ORDER — DOCUSATE SODIUM 100 MG
100 CAPSULE ORAL THREE TIMES A DAY
Qty: 0 | Refills: 0 | Status: DISCONTINUED | OUTPATIENT
Start: 2017-03-31 | End: 2017-04-15

## 2017-03-31 RX ORDER — METOPROLOL TARTRATE 50 MG
1 TABLET ORAL
Qty: 30 | Refills: 0 | COMMUNITY

## 2017-03-31 RX ADMIN — MEXILETINE HYDROCHLORIDE 150 MILLIGRAM(S): 150 CAPSULE ORAL at 14:12

## 2017-03-31 RX ADMIN — RIVAROXABAN 20 MILLIGRAM(S): KIT at 11:28

## 2017-03-31 RX ADMIN — SENNA PLUS 2 TABLET(S): 8.6 TABLET ORAL at 22:01

## 2017-03-31 RX ADMIN — Medication 100 MILLIGRAM(S): at 22:00

## 2017-03-31 RX ADMIN — MEXILETINE HYDROCHLORIDE 150 MILLIGRAM(S): 150 CAPSULE ORAL at 05:27

## 2017-03-31 RX ADMIN — Medication 100 MILLIGRAM(S): at 11:28

## 2017-03-31 RX ADMIN — Medication 1 MILLIGRAM(S): at 11:28

## 2017-03-31 RX ADMIN — Medication 25 MILLIGRAM(S): at 15:00

## 2017-03-31 RX ADMIN — Medication 0.6 MILLIGRAM(S): at 11:28

## 2017-03-31 RX ADMIN — Medication 30 MILLILITER(S): at 23:33

## 2017-03-31 RX ADMIN — Medication 20 MILLIEQUIVALENT(S): at 15:23

## 2017-03-31 RX ADMIN — Medication 50 GRAM(S): at 13:56

## 2017-03-31 RX ADMIN — MEXILETINE HYDROCHLORIDE 150 MILLIGRAM(S): 150 CAPSULE ORAL at 22:00

## 2017-03-31 NOTE — H&P ADULT. - NEGATIVE MUSCULOSKELETAL SYMPTOMS
no joint swelling/no neck pain/no myalgia/no muscle cramps/no back pain/no stiffness/no arm pain R/no arm pain L/no leg pain L/no muscle weakness/no leg pain R/no arthritis/no arthralgia

## 2017-03-31 NOTE — H&P ADULT. - GASTROINTESTINAL DETAILS
no rigidity/no rebound tenderness/normal/soft/no masses palpable/no distention/no guarding/nontender/bowel sounds normal

## 2017-03-31 NOTE — H&P ADULT. - RS GEN PE MLT RESP DETAILS PC
no wheezes/airway patent/respirations non-labored/breath sounds equal/normal/no intercostal retractions/no rales/clear to auscultation bilaterally/good air movement/no chest wall tenderness/no rhonchi

## 2017-03-31 NOTE — H&P ADULT. - NEGATIVE NEUROLOGICAL SYMPTOMS
no headache/no hemiparesis/no confusion/no tremors/no weakness/no generalized seizures/no loss of consciousness/no transient paralysis/no vertigo/no syncope/no focal seizures/no difficulty walking/no facial palsy

## 2017-03-31 NOTE — H&P ADULT. - HISTORY OF PRESENT ILLNESS
67 y/o M with h/o CHF (EF 25%) , Non-ischemic cardiomyopathy s/p AICD, a. fib on xarelto, HTN, V. fib, SVT, presents to the ED for AICD firing. Pt states the AICD fired around 6 pm. Pt states he has been feeling dizzy and nauseous. Pt states he had abdominal pain previous however it was relieved with maalox. Pt denies LOC, syncope, fever, chills, chest pain, shortness of breath, palpitation, nausea, diarrhea, constipation, fever, headache, urinary/bowel incontinence or any other complaints at this time.     In the ED, patient is currently stable and in no acute distress.Vitals are /75,  T 97.8 HR 93, R 22, SpO2 96%. Pt is admitted to telemetry.

## 2017-03-31 NOTE — H&P ADULT. - NEGATIVE CARDIOVASCULAR SYMPTOMS
no dyspnea on exertion/no claudication/no peripheral edema/no chest pain/no paroxysmal nocturnal dyspnea/no orthopnea/no palpitations

## 2017-03-31 NOTE — H&P ADULT. - ASSESSMENT
65 y/o M with h/o CHF (EF 25%) , Non-ischemic cardiomyopathy s/p AICD, a. fib on xarelto, HTN, V. fib, SVT, presents to the ED for AICD firing. Admitted to telemetry.

## 2017-03-31 NOTE — H&P ADULT. - PROBLEM SELECTOR PLAN 1
Admit to telemetry.   Cards consult appreciated.   Check BMP with mag and phos, CBC, TSH, lipid, hemoglobin a1c  f/u MD note

## 2017-03-31 NOTE — H&P ADULT. - PSH
AICD (Automatic Cardioverter/Defibrillator) Present  [employer] with 1 [employer] lead4/1/09- explanted and replaced with [employer] 2 leads on 9/2/09  History of Prior Ablation Treatment    Status post left hip replacement

## 2017-04-01 LAB
BUN SERPL-MCNC: 26 MG/DL — HIGH (ref 7–23)
CALCIUM SERPL-MCNC: 8.9 MG/DL — SIGNIFICANT CHANGE UP (ref 8.4–10.5)
CHLORIDE SERPL-SCNC: 96 MMOL/L — LOW (ref 98–107)
CO2 SERPL-SCNC: 19 MMOL/L — LOW (ref 22–31)
CREAT SERPL-MCNC: 1.9 MG/DL — HIGH (ref 0.5–1.3)
GLUCOSE SERPL-MCNC: 84 MG/DL — SIGNIFICANT CHANGE UP (ref 70–99)
HCT VFR BLD CALC: 37.1 % — LOW (ref 39–50)
HGB BLD-MCNC: 12.2 G/DL — LOW (ref 13–17)
MAGNESIUM SERPL-MCNC: 3 MG/DL — HIGH (ref 1.6–2.6)
MCHC RBC-ENTMCNC: 30.1 PG — SIGNIFICANT CHANGE UP (ref 27–34)
MCHC RBC-ENTMCNC: 32.9 % — SIGNIFICANT CHANGE UP (ref 32–36)
MCV RBC AUTO: 91.6 FL — SIGNIFICANT CHANGE UP (ref 80–100)
PLATELET # BLD AUTO: 294 K/UL — SIGNIFICANT CHANGE UP (ref 150–400)
PMV BLD: 11.2 FL — SIGNIFICANT CHANGE UP (ref 7–13)
POTASSIUM SERPL-MCNC: 4.9 MMOL/L — SIGNIFICANT CHANGE UP (ref 3.5–5.3)
POTASSIUM SERPL-SCNC: 4.9 MMOL/L — SIGNIFICANT CHANGE UP (ref 3.5–5.3)
RBC # BLD: 4.05 M/UL — LOW (ref 4.2–5.8)
RBC # FLD: 18.1 % — HIGH (ref 10.3–14.5)
SODIUM SERPL-SCNC: 133 MMOL/L — LOW (ref 135–145)
WBC # BLD: 6.51 K/UL — SIGNIFICANT CHANGE UP (ref 3.8–10.5)
WBC # FLD AUTO: 6.51 K/UL — SIGNIFICANT CHANGE UP (ref 3.8–10.5)

## 2017-04-01 PROCEDURE — 99233 SBSQ HOSP IP/OBS HIGH 50: CPT

## 2017-04-01 RX ORDER — LANOLIN ALCOHOL/MO/W.PET/CERES
3 CREAM (GRAM) TOPICAL AT BEDTIME
Qty: 0 | Refills: 0 | Status: DISCONTINUED | OUTPATIENT
Start: 2017-04-01 | End: 2017-04-15

## 2017-04-01 RX ADMIN — Medication 100 MILLIGRAM(S): at 05:41

## 2017-04-01 RX ADMIN — Medication 1 MILLIGRAM(S): at 11:27

## 2017-04-01 RX ADMIN — Medication 0.6 MILLIGRAM(S): at 11:27

## 2017-04-01 RX ADMIN — Medication 100 MILLIGRAM(S): at 14:19

## 2017-04-01 RX ADMIN — RIVAROXABAN 20 MILLIGRAM(S): KIT at 11:27

## 2017-04-01 RX ADMIN — Medication 3 MILLIGRAM(S): at 21:28

## 2017-04-01 RX ADMIN — Medication 50 MILLIGRAM(S): at 05:42

## 2017-04-01 RX ADMIN — MEXILETINE HYDROCHLORIDE 150 MILLIGRAM(S): 150 CAPSULE ORAL at 21:28

## 2017-04-01 RX ADMIN — Medication 40 MILLIGRAM(S): at 11:27

## 2017-04-01 RX ADMIN — MEXILETINE HYDROCHLORIDE 150 MILLIGRAM(S): 150 CAPSULE ORAL at 05:41

## 2017-04-01 RX ADMIN — Medication 30 MILLILITER(S): at 09:43

## 2017-04-01 RX ADMIN — MEXILETINE HYDROCHLORIDE 150 MILLIGRAM(S): 150 CAPSULE ORAL at 15:21

## 2017-04-02 LAB
BUN SERPL-MCNC: 42 MG/DL — HIGH (ref 7–23)
CALCIUM SERPL-MCNC: 8.9 MG/DL — SIGNIFICANT CHANGE UP (ref 8.4–10.5)
CHLORIDE SERPL-SCNC: 95 MMOL/L — LOW (ref 98–107)
CO2 SERPL-SCNC: 19 MMOL/L — LOW (ref 22–31)
CREAT SERPL-MCNC: 3.18 MG/DL — HIGH (ref 0.5–1.3)
GLUCOSE SERPL-MCNC: 70 MG/DL — SIGNIFICANT CHANGE UP (ref 70–99)
HCT VFR BLD CALC: 38.4 % — LOW (ref 39–50)
HGB BLD-MCNC: 12.7 G/DL — LOW (ref 13–17)
MAGNESIUM SERPL-MCNC: 3.4 MG/DL — HIGH (ref 1.6–2.6)
MCHC RBC-ENTMCNC: 29.8 PG — SIGNIFICANT CHANGE UP (ref 27–34)
MCHC RBC-ENTMCNC: 33.1 % — SIGNIFICANT CHANGE UP (ref 32–36)
MCV RBC AUTO: 90.1 FL — SIGNIFICANT CHANGE UP (ref 80–100)
PLATELET # BLD AUTO: 301 K/UL — SIGNIFICANT CHANGE UP (ref 150–400)
PMV BLD: 11.4 FL — SIGNIFICANT CHANGE UP (ref 7–13)
POTASSIUM SERPL-MCNC: 5 MMOL/L — SIGNIFICANT CHANGE UP (ref 3.5–5.3)
POTASSIUM SERPL-SCNC: 5 MMOL/L — SIGNIFICANT CHANGE UP (ref 3.5–5.3)
RBC # BLD: 4.26 M/UL — SIGNIFICANT CHANGE UP (ref 4.2–5.8)
RBC # FLD: 18.1 % — HIGH (ref 10.3–14.5)
SODIUM SERPL-SCNC: 133 MMOL/L — LOW (ref 135–145)
WBC # BLD: 6.93 K/UL — SIGNIFICANT CHANGE UP (ref 3.8–10.5)
WBC # FLD AUTO: 6.93 K/UL — SIGNIFICANT CHANGE UP (ref 3.8–10.5)

## 2017-04-02 PROCEDURE — 99233 SBSQ HOSP IP/OBS HIGH 50: CPT

## 2017-04-02 RX ADMIN — Medication 30 MILLILITER(S): at 17:23

## 2017-04-02 RX ADMIN — MEXILETINE HYDROCHLORIDE 150 MILLIGRAM(S): 150 CAPSULE ORAL at 06:00

## 2017-04-02 RX ADMIN — Medication 0.6 MILLIGRAM(S): at 11:14

## 2017-04-02 RX ADMIN — MEXILETINE HYDROCHLORIDE 150 MILLIGRAM(S): 150 CAPSULE ORAL at 21:32

## 2017-04-02 RX ADMIN — Medication 50 MILLIGRAM(S): at 06:00

## 2017-04-02 RX ADMIN — Medication 1 MILLIGRAM(S): at 11:14

## 2017-04-02 RX ADMIN — Medication 50 MILLIGRAM(S): at 17:23

## 2017-04-02 RX ADMIN — RIVAROXABAN 20 MILLIGRAM(S): KIT at 11:14

## 2017-04-02 RX ADMIN — MEXILETINE HYDROCHLORIDE 150 MILLIGRAM(S): 150 CAPSULE ORAL at 14:18

## 2017-04-03 LAB
ALBUMIN SERPL ELPH-MCNC: 3.3 G/DL — SIGNIFICANT CHANGE UP (ref 3.3–5)
ALP SERPL-CCNC: 80 U/L — SIGNIFICANT CHANGE UP (ref 40–120)
ALT FLD-CCNC: 432 U/L — HIGH (ref 4–41)
APPEARANCE UR: CLEAR — SIGNIFICANT CHANGE UP
AST SERPL-CCNC: 457 U/L — HIGH (ref 4–40)
BACTERIA # UR AUTO: SIGNIFICANT CHANGE UP
BILIRUB DIRECT SERPL-MCNC: 0.6 MG/DL — HIGH (ref 0.1–0.2)
BILIRUB SERPL-MCNC: 1.3 MG/DL — HIGH (ref 0.2–1.2)
BILIRUB UR-MCNC: NEGATIVE — SIGNIFICANT CHANGE UP
BLOOD UR QL VISUAL: NEGATIVE — SIGNIFICANT CHANGE UP
BUN SERPL-MCNC: 60 MG/DL — HIGH (ref 7–23)
CALCIUM SERPL-MCNC: 8.8 MG/DL — SIGNIFICANT CHANGE UP (ref 8.4–10.5)
CHLORIDE SERPL-SCNC: 101 MMOL/L — SIGNIFICANT CHANGE UP (ref 98–107)
CHLORIDE UR-SCNC: 7 MMOL/L — SIGNIFICANT CHANGE UP
CO2 SERPL-SCNC: 20 MMOL/L — LOW (ref 22–31)
COLOR SPEC: YELLOW — SIGNIFICANT CHANGE UP
CREAT ?TM UR-MCNC: 158.33 MG/DL — SIGNIFICANT CHANGE UP
CREAT SERPL-MCNC: 3.03 MG/DL — HIGH (ref 0.5–1.3)
GLUCOSE SERPL-MCNC: 71 MG/DL — SIGNIFICANT CHANGE UP (ref 70–99)
GLUCOSE UR-MCNC: NEGATIVE — SIGNIFICANT CHANGE UP
HCT VFR BLD CALC: 38.1 % — LOW (ref 39–50)
HGB BLD-MCNC: 12.6 G/DL — LOW (ref 13–17)
HYALINE CASTS # UR AUTO: SIGNIFICANT CHANGE UP (ref 0–?)
KETONES UR-MCNC: SIGNIFICANT CHANGE UP
LEUKOCYTE ESTERASE UR-ACNC: NEGATIVE — SIGNIFICANT CHANGE UP
MAGNESIUM SERPL-MCNC: 3.3 MG/DL — HIGH (ref 1.6–2.6)
MCHC RBC-ENTMCNC: 30.3 PG — SIGNIFICANT CHANGE UP (ref 27–34)
MCHC RBC-ENTMCNC: 33.1 % — SIGNIFICANT CHANGE UP (ref 32–36)
MCV RBC AUTO: 91.6 FL — SIGNIFICANT CHANGE UP (ref 80–100)
MUCOUS THREADS # UR AUTO: SIGNIFICANT CHANGE UP
NITRITE UR-MCNC: NEGATIVE — SIGNIFICANT CHANGE UP
NON-SQ EPI CELLS # UR AUTO: <1 — SIGNIFICANT CHANGE UP
OSMOLALITY UR: 563 MOSMO/KG — SIGNIFICANT CHANGE UP (ref 50–1200)
PH UR: 5.5 — SIGNIFICANT CHANGE UP (ref 4.6–8)
PLATELET # BLD AUTO: 273 K/UL — SIGNIFICANT CHANGE UP (ref 150–400)
PMV BLD: 11.7 FL — SIGNIFICANT CHANGE UP (ref 7–13)
POTASSIUM SERPL-MCNC: 4.9 MMOL/L — SIGNIFICANT CHANGE UP (ref 3.5–5.3)
POTASSIUM SERPL-SCNC: 4.9 MMOL/L — SIGNIFICANT CHANGE UP (ref 3.5–5.3)
POTASSIUM UR-SCNC: 27.8 MEQ/L — SIGNIFICANT CHANGE UP
PROT SERPL-MCNC: 6.6 G/DL — SIGNIFICANT CHANGE UP (ref 6–8.3)
PROT UR-MCNC: 10 — SIGNIFICANT CHANGE UP
RBC # BLD: 4.16 M/UL — LOW (ref 4.2–5.8)
RBC # FLD: 18 % — HIGH (ref 10.3–14.5)
RBC CASTS # UR COMP ASSIST: SIGNIFICANT CHANGE UP (ref 0–?)
SODIUM SERPL-SCNC: 141 MMOL/L — SIGNIFICANT CHANGE UP (ref 135–145)
SODIUM UR-SCNC: 28 MEQ/L — SIGNIFICANT CHANGE UP
SP GR SPEC: 1.02 — SIGNIFICANT CHANGE UP (ref 1–1.03)
SQUAMOUS # UR AUTO: SIGNIFICANT CHANGE UP
UROBILINOGEN FLD QL: NORMAL E.U. — SIGNIFICANT CHANGE UP (ref 0.1–0.2)
UUN UR-MCNC: 1106.7 MG/DL — SIGNIFICANT CHANGE UP
WBC # BLD: 6.29 K/UL — SIGNIFICANT CHANGE UP (ref 3.8–10.5)
WBC # FLD AUTO: 6.29 K/UL — SIGNIFICANT CHANGE UP (ref 3.8–10.5)
WBC UR QL: SIGNIFICANT CHANGE UP (ref 0–?)

## 2017-04-03 PROCEDURE — 99223 1ST HOSP IP/OBS HIGH 75: CPT | Mod: GC

## 2017-04-03 RX ADMIN — MEXILETINE HYDROCHLORIDE 150 MILLIGRAM(S): 150 CAPSULE ORAL at 05:17

## 2017-04-03 RX ADMIN — Medication 100 MILLIGRAM(S): at 13:42

## 2017-04-03 RX ADMIN — Medication 50 MILLIGRAM(S): at 05:17

## 2017-04-03 RX ADMIN — Medication 1 MILLIGRAM(S): at 13:42

## 2017-04-03 RX ADMIN — Medication 30 MILLILITER(S): at 22:17

## 2017-04-03 RX ADMIN — RIVAROXABAN 20 MILLIGRAM(S): KIT at 13:42

## 2017-04-03 RX ADMIN — Medication 100 MILLIGRAM(S): at 05:17

## 2017-04-04 ENCOUNTER — APPOINTMENT (OUTPATIENT)
Dept: ELECTROPHYSIOLOGY | Facility: CLINIC | Age: 67
End: 2017-04-04

## 2017-04-04 LAB
ALBUMIN SERPL ELPH-MCNC: 3 G/DL — LOW (ref 3.3–5)
ALP SERPL-CCNC: 75 U/L — SIGNIFICANT CHANGE UP (ref 40–120)
ALT FLD-CCNC: 362 U/L — HIGH (ref 4–41)
AST SERPL-CCNC: 317 U/L — HIGH (ref 4–40)
BILIRUB DIRECT SERPL-MCNC: 0.5 MG/DL — HIGH (ref 0.1–0.2)
BILIRUB SERPL-MCNC: 1.3 MG/DL — HIGH (ref 0.2–1.2)
BUN SERPL-MCNC: 46 MG/DL — HIGH (ref 7–23)
CALCIUM SERPL-MCNC: 8.7 MG/DL — SIGNIFICANT CHANGE UP (ref 8.4–10.5)
CHLORIDE SERPL-SCNC: 100 MMOL/L — SIGNIFICANT CHANGE UP (ref 98–107)
CO2 SERPL-SCNC: 21 MMOL/L — LOW (ref 22–31)
CREAT SERPL-MCNC: 1.87 MG/DL — HIGH (ref 0.5–1.3)
GLUCOSE SERPL-MCNC: 65 MG/DL — LOW (ref 70–99)
HCT VFR BLD CALC: 39.9 % — SIGNIFICANT CHANGE UP (ref 39–50)
HGB BLD-MCNC: 13 G/DL — SIGNIFICANT CHANGE UP (ref 13–17)
MAGNESIUM SERPL-MCNC: 2.9 MG/DL — HIGH (ref 1.6–2.6)
MCHC RBC-ENTMCNC: 29.8 PG — SIGNIFICANT CHANGE UP (ref 27–34)
MCHC RBC-ENTMCNC: 32.6 % — SIGNIFICANT CHANGE UP (ref 32–36)
MCV RBC AUTO: 91.5 FL — SIGNIFICANT CHANGE UP (ref 80–100)
PLATELET # BLD AUTO: 282 K/UL — SIGNIFICANT CHANGE UP (ref 150–400)
PMV BLD: 11.3 FL — SIGNIFICANT CHANGE UP (ref 7–13)
POTASSIUM SERPL-MCNC: 4 MMOL/L — SIGNIFICANT CHANGE UP (ref 3.5–5.3)
POTASSIUM SERPL-SCNC: 4 MMOL/L — SIGNIFICANT CHANGE UP (ref 3.5–5.3)
PROT SERPL-MCNC: 6.2 G/DL — SIGNIFICANT CHANGE UP (ref 6–8.3)
RBC # BLD: 4.36 M/UL — SIGNIFICANT CHANGE UP (ref 4.2–5.8)
RBC # FLD: 17.9 % — HIGH (ref 10.3–14.5)
SODIUM SERPL-SCNC: 137 MMOL/L — SIGNIFICANT CHANGE UP (ref 135–145)
WBC # BLD: 6.67 K/UL — SIGNIFICANT CHANGE UP (ref 3.8–10.5)
WBC # FLD AUTO: 6.67 K/UL — SIGNIFICANT CHANGE UP (ref 3.8–10.5)

## 2017-04-04 PROCEDURE — 99223 1ST HOSP IP/OBS HIGH 75: CPT

## 2017-04-04 PROCEDURE — 99232 SBSQ HOSP IP/OBS MODERATE 35: CPT | Mod: GC

## 2017-04-04 PROCEDURE — 76775 US EXAM ABDO BACK WALL LIM: CPT | Mod: 26

## 2017-04-04 PROCEDURE — 99232 SBSQ HOSP IP/OBS MODERATE 35: CPT

## 2017-04-04 RX ADMIN — Medication 1 MILLIGRAM(S): at 13:03

## 2017-04-04 RX ADMIN — Medication 100 MILLIGRAM(S): at 13:05

## 2017-04-04 RX ADMIN — RIVAROXABAN 20 MILLIGRAM(S): KIT at 13:03

## 2017-04-05 LAB
ALBUMIN SERPL ELPH-MCNC: 2.8 G/DL — LOW (ref 3.3–5)
ALP SERPL-CCNC: 74 U/L — SIGNIFICANT CHANGE UP (ref 40–120)
ALT FLD-CCNC: 307 U/L — HIGH (ref 4–41)
AST SERPL-CCNC: 275 U/L — HIGH (ref 4–40)
BILIRUB DIRECT SERPL-MCNC: 0.5 MG/DL — HIGH (ref 0.1–0.2)
BILIRUB SERPL-MCNC: 1.3 MG/DL — HIGH (ref 0.2–1.2)
BUN SERPL-MCNC: 41 MG/DL — HIGH (ref 7–23)
CALCIUM SERPL-MCNC: 8.3 MG/DL — LOW (ref 8.4–10.5)
CHLORIDE SERPL-SCNC: 105 MMOL/L — SIGNIFICANT CHANGE UP (ref 98–107)
CO2 SERPL-SCNC: 23 MMOL/L — SIGNIFICANT CHANGE UP (ref 22–31)
CREAT SERPL-MCNC: 1.22 MG/DL — SIGNIFICANT CHANGE UP (ref 0.5–1.3)
GLUCOSE SERPL-MCNC: 75 MG/DL — SIGNIFICANT CHANGE UP (ref 70–99)
HCT VFR BLD CALC: 37.1 % — LOW (ref 39–50)
HGB BLD-MCNC: 11.9 G/DL — LOW (ref 13–17)
MAGNESIUM SERPL-MCNC: 2.4 MG/DL — SIGNIFICANT CHANGE UP (ref 1.6–2.6)
MCHC RBC-ENTMCNC: 30.1 PG — SIGNIFICANT CHANGE UP (ref 27–34)
MCHC RBC-ENTMCNC: 32.1 % — SIGNIFICANT CHANGE UP (ref 32–36)
MCV RBC AUTO: 93.7 FL — SIGNIFICANT CHANGE UP (ref 80–100)
PLATELET # BLD AUTO: 288 K/UL — SIGNIFICANT CHANGE UP (ref 150–400)
PMV BLD: 11.2 FL — SIGNIFICANT CHANGE UP (ref 7–13)
POTASSIUM SERPL-MCNC: 4.1 MMOL/L — SIGNIFICANT CHANGE UP (ref 3.5–5.3)
POTASSIUM SERPL-SCNC: 4.1 MMOL/L — SIGNIFICANT CHANGE UP (ref 3.5–5.3)
PROT SERPL-MCNC: 5.9 G/DL — LOW (ref 6–8.3)
RBC # BLD: 3.96 M/UL — LOW (ref 4.2–5.8)
RBC # FLD: 18.2 % — HIGH (ref 10.3–14.5)
SODIUM SERPL-SCNC: 143 MMOL/L — SIGNIFICANT CHANGE UP (ref 135–145)
WBC # BLD: 5.99 K/UL — SIGNIFICANT CHANGE UP (ref 3.8–10.5)
WBC # FLD AUTO: 5.99 K/UL — SIGNIFICANT CHANGE UP (ref 3.8–10.5)

## 2017-04-05 PROCEDURE — 99232 SBSQ HOSP IP/OBS MODERATE 35: CPT | Mod: GC

## 2017-04-05 PROCEDURE — 99232 SBSQ HOSP IP/OBS MODERATE 35: CPT

## 2017-04-05 RX ORDER — SPIRONOLACTONE 25 MG/1
12.5 TABLET, FILM COATED ORAL DAILY
Qty: 0 | Refills: 0 | Status: DISCONTINUED | OUTPATIENT
Start: 2017-04-06 | End: 2017-04-06

## 2017-04-05 RX ORDER — VALSARTAN 80 MG/1
40 TABLET ORAL
Qty: 0 | Refills: 0 | Status: DISCONTINUED | OUTPATIENT
Start: 2017-04-06 | End: 2017-04-06

## 2017-04-05 RX ORDER — MEXILETINE HYDROCHLORIDE 150 MG/1
150 CAPSULE ORAL THREE TIMES A DAY
Qty: 0 | Refills: 0 | Status: DISCONTINUED | OUTPATIENT
Start: 2017-04-05 | End: 2017-04-15

## 2017-04-05 RX ORDER — METOPROLOL TARTRATE 50 MG
50 TABLET ORAL
Qty: 0 | Refills: 0 | Status: DISCONTINUED | OUTPATIENT
Start: 2017-04-05 | End: 2017-04-07

## 2017-04-05 RX ADMIN — Medication 50 MILLIGRAM(S): at 05:19

## 2017-04-05 RX ADMIN — Medication 50 MILLIGRAM(S): at 20:08

## 2017-04-05 RX ADMIN — Medication 30 MILLILITER(S): at 21:17

## 2017-04-05 RX ADMIN — Medication 100 MILLIGRAM(S): at 21:17

## 2017-04-05 RX ADMIN — Medication 1 MILLIGRAM(S): at 12:55

## 2017-04-05 RX ADMIN — MEXILETINE HYDROCHLORIDE 150 MILLIGRAM(S): 150 CAPSULE ORAL at 21:18

## 2017-04-05 RX ADMIN — MEXILETINE HYDROCHLORIDE 150 MILLIGRAM(S): 150 CAPSULE ORAL at 18:11

## 2017-04-05 RX ADMIN — Medication 100 MILLIGRAM(S): at 12:55

## 2017-04-05 RX ADMIN — SENNA PLUS 2 TABLET(S): 8.6 TABLET ORAL at 21:17

## 2017-04-06 LAB
ALBUMIN SERPL ELPH-MCNC: 2.8 G/DL — LOW (ref 3.3–5)
ALP SERPL-CCNC: 70 U/L — SIGNIFICANT CHANGE UP (ref 40–120)
ALT FLD-CCNC: 279 U/L — HIGH (ref 4–41)
AST SERPL-CCNC: 219 U/L — HIGH (ref 4–40)
BILIRUB DIRECT SERPL-MCNC: 0.5 MG/DL — HIGH (ref 0.1–0.2)
BILIRUB SERPL-MCNC: 1.4 MG/DL — HIGH (ref 0.2–1.2)
BUN SERPL-MCNC: 39 MG/DL — HIGH (ref 7–23)
CALCIUM SERPL-MCNC: 8.6 MG/DL — SIGNIFICANT CHANGE UP (ref 8.4–10.5)
CHLORIDE SERPL-SCNC: 106 MMOL/L — SIGNIFICANT CHANGE UP (ref 98–107)
CO2 SERPL-SCNC: 22 MMOL/L — SIGNIFICANT CHANGE UP (ref 22–31)
CREAT SERPL-MCNC: 1.07 MG/DL — SIGNIFICANT CHANGE UP (ref 0.5–1.3)
GLUCOSE SERPL-MCNC: 75 MG/DL — SIGNIFICANT CHANGE UP (ref 70–99)
HCT VFR BLD CALC: 32.1 % — LOW (ref 39–50)
HGB BLD-MCNC: 10.2 G/DL — LOW (ref 13–17)
MAGNESIUM SERPL-MCNC: 2.4 MG/DL — SIGNIFICANT CHANGE UP (ref 1.6–2.6)
MCHC RBC-ENTMCNC: 29.7 PG — SIGNIFICANT CHANGE UP (ref 27–34)
MCHC RBC-ENTMCNC: 31.8 % — LOW (ref 32–36)
MCV RBC AUTO: 93.6 FL — SIGNIFICANT CHANGE UP (ref 80–100)
PLATELET # BLD AUTO: 302 K/UL — SIGNIFICANT CHANGE UP (ref 150–400)
PMV BLD: 10.8 FL — SIGNIFICANT CHANGE UP (ref 7–13)
POTASSIUM SERPL-MCNC: 4.4 MMOL/L — SIGNIFICANT CHANGE UP (ref 3.5–5.3)
POTASSIUM SERPL-SCNC: 4.4 MMOL/L — SIGNIFICANT CHANGE UP (ref 3.5–5.3)
PROT SERPL-MCNC: 5.8 G/DL — LOW (ref 6–8.3)
RBC # BLD: 3.43 M/UL — LOW (ref 4.2–5.8)
RBC # FLD: 18.5 % — HIGH (ref 10.3–14.5)
SODIUM SERPL-SCNC: 141 MMOL/L — SIGNIFICANT CHANGE UP (ref 135–145)
WBC # BLD: 6.32 K/UL — SIGNIFICANT CHANGE UP (ref 3.8–10.5)
WBC # FLD AUTO: 6.32 K/UL — SIGNIFICANT CHANGE UP (ref 3.8–10.5)

## 2017-04-06 PROCEDURE — 93451 RIGHT HEART CATH: CPT | Mod: 26

## 2017-04-06 PROCEDURE — 99232 SBSQ HOSP IP/OBS MODERATE 35: CPT | Mod: GC

## 2017-04-06 RX ORDER — RIVAROXABAN 15 MG-20MG
20 KIT ORAL DAILY
Qty: 0 | Refills: 0 | Status: DISCONTINUED | OUTPATIENT
Start: 2017-04-07 | End: 2017-04-07

## 2017-04-06 RX ORDER — MEXILETINE HYDROCHLORIDE 150 MG/1
150 CAPSULE ORAL ONCE
Qty: 0 | Refills: 0 | Status: DISCONTINUED | OUTPATIENT
Start: 2017-04-06 | End: 2017-04-06

## 2017-04-06 RX ORDER — FUROSEMIDE 40 MG
5 TABLET ORAL
Qty: 500 | Refills: 0 | Status: DISCONTINUED | OUTPATIENT
Start: 2017-04-06 | End: 2017-04-10

## 2017-04-06 RX ORDER — PANTOPRAZOLE SODIUM 20 MG/1
40 TABLET, DELAYED RELEASE ORAL DAILY
Qty: 0 | Refills: 0 | Status: DISCONTINUED | OUTPATIENT
Start: 2017-04-06 | End: 2017-04-07

## 2017-04-06 RX ORDER — PHENYLEPHRINE HYDROCHLORIDE 10 MG/ML
0.5 INJECTION INTRAVENOUS
Qty: 80 | Refills: 0 | Status: DISCONTINUED | OUTPATIENT
Start: 2017-04-06 | End: 2017-04-07

## 2017-04-06 RX ORDER — MILRINONE LACTATE 1 MG/ML
0.12 INJECTION, SOLUTION INTRAVENOUS
Qty: 20 | Refills: 0 | Status: DISCONTINUED | OUTPATIENT
Start: 2017-04-06 | End: 2017-04-06

## 2017-04-06 RX ORDER — DOBUTAMINE HCL 250MG/20ML
2.5 VIAL (ML) INTRAVENOUS
Qty: 500 | Refills: 0 | Status: DISCONTINUED | OUTPATIENT
Start: 2017-04-06 | End: 2017-04-11

## 2017-04-06 RX ORDER — VASOPRESSIN 20 [USP'U]/ML
0.04 INJECTION INTRAVENOUS
Qty: 100 | Refills: 0 | Status: DISCONTINUED | OUTPATIENT
Start: 2017-04-06 | End: 2017-04-07

## 2017-04-06 RX ORDER — PHENYLEPHRINE HYDROCHLORIDE 10 MG/ML
0.5 INJECTION INTRAVENOUS
Qty: 80 | Refills: 0 | Status: DISCONTINUED | OUTPATIENT
Start: 2017-04-06 | End: 2017-04-06

## 2017-04-06 RX ADMIN — Medication 5.45 MICROGRAM(S)/KG/MIN: at 22:47

## 2017-04-06 RX ADMIN — Medication 2.5 MG/HR: at 22:13

## 2017-04-06 RX ADMIN — Medication 2.5 MG/HR: at 17:30

## 2017-04-06 RX ADMIN — Medication 1 MILLIGRAM(S): at 12:30

## 2017-04-06 RX ADMIN — Medication 2.5 MG/HR: at 22:48

## 2017-04-06 RX ADMIN — MEXILETINE HYDROCHLORIDE 150 MILLIGRAM(S): 150 CAPSULE ORAL at 06:33

## 2017-04-06 RX ADMIN — Medication 10.89 MICROGRAM(S)/KG/MIN: at 23:54

## 2017-04-06 RX ADMIN — VASOPRESSIN 2.4 UNIT(S)/MIN: 20 INJECTION INTRAVENOUS at 22:48

## 2017-04-06 RX ADMIN — Medication 50 MILLIGRAM(S): at 12:30

## 2017-04-06 RX ADMIN — MILRINONE LACTATE 5.45 MICROGRAM(S)/KG/MIN: 1 INJECTION, SOLUTION INTRAVENOUS at 17:26

## 2017-04-06 RX ADMIN — Medication 100 MILLIGRAM(S): at 17:25

## 2017-04-06 RX ADMIN — MEXILETINE HYDROCHLORIDE 150 MILLIGRAM(S): 150 CAPSULE ORAL at 17:52

## 2017-04-06 RX ADMIN — Medication 50 MILLIGRAM(S): at 17:25

## 2017-04-06 RX ADMIN — SPIRONOLACTONE 12.5 MILLIGRAM(S): 25 TABLET, FILM COATED ORAL at 06:33

## 2017-04-06 RX ADMIN — PHENYLEPHRINE HYDROCHLORIDE 13.61 MICROGRAM(S)/KG/MIN: 10 INJECTION INTRAVENOUS at 23:54

## 2017-04-06 RX ADMIN — MEXILETINE HYDROCHLORIDE 150 MILLIGRAM(S): 150 CAPSULE ORAL at 22:13

## 2017-04-06 RX ADMIN — PANTOPRAZOLE SODIUM 40 MILLIGRAM(S): 20 TABLET, DELAYED RELEASE ORAL at 23:55

## 2017-04-06 RX ADMIN — Medication 100 MILLIGRAM(S): at 22:13

## 2017-04-06 NOTE — PROVIDER CONTACT NOTE (OTHER) - ACTION/TREATMENT ORDERED:
Primacor DC, Hold off Vasopressin, start Dobutamine drip at 2.5 mcg/kg/min, closely monitor, if BP remain low, Vasopressin will start at 2.4 cc/hr.

## 2017-04-06 NOTE — PROVIDER CONTACT NOTE (OTHER) - ACTION/TREATMENT ORDERED:
Primacor decrease to 0.125 mcg/kg/hr, might start Vasopressin awaiting for orders Primacor decrease to 0.125 mcg/kg/min, might start Vasopressin awaiting for orders

## 2017-04-07 LAB
ALBUMIN SERPL ELPH-MCNC: 2.8 G/DL — LOW (ref 3.3–5)
ALBUMIN SERPL ELPH-MCNC: 3.1 G/DL — LOW (ref 3.3–5)
ALP SERPL-CCNC: 74 U/L — SIGNIFICANT CHANGE UP (ref 40–120)
ALP SERPL-CCNC: 77 U/L — SIGNIFICANT CHANGE UP (ref 40–120)
ALT FLD-CCNC: 247 U/L — HIGH (ref 4–41)
ALT FLD-CCNC: 257 U/L — HIGH (ref 4–41)
AST SERPL-CCNC: 156 U/L — HIGH (ref 4–40)
AST SERPL-CCNC: 186 U/L — HIGH (ref 4–40)
BASE EXCESS BLDV CALC-SCNC: 1.6 MMOL/L — SIGNIFICANT CHANGE UP
BILIRUB DIRECT SERPL-MCNC: 0.5 MG/DL — HIGH (ref 0.1–0.2)
BILIRUB SERPL-MCNC: 1.1 MG/DL — SIGNIFICANT CHANGE UP (ref 0.2–1.2)
BILIRUB SERPL-MCNC: 1.8 MG/DL — HIGH (ref 0.2–1.2)
BLD GP AB SCN SERPL QL: NEGATIVE — SIGNIFICANT CHANGE UP
BLOOD GAS VENOUS - CREATININE: 1.08 MG/DL — SIGNIFICANT CHANGE UP (ref 0.5–1.3)
BUN SERPL-MCNC: 37 MG/DL — HIGH (ref 7–23)
BUN SERPL-MCNC: 38 MG/DL — HIGH (ref 7–23)
BUN SERPL-MCNC: 40 MG/DL — HIGH (ref 7–23)
CALCIUM SERPL-MCNC: 8.6 MG/DL — SIGNIFICANT CHANGE UP (ref 8.4–10.5)
CALCIUM SERPL-MCNC: 8.9 MG/DL — SIGNIFICANT CHANGE UP (ref 8.4–10.5)
CALCIUM SERPL-MCNC: 9.4 MG/DL — SIGNIFICANT CHANGE UP (ref 8.4–10.5)
CHLORIDE BLDV-SCNC: 109 MMOL/L — HIGH (ref 96–108)
CHLORIDE SERPL-SCNC: 100 MMOL/L — SIGNIFICANT CHANGE UP (ref 98–107)
CHLORIDE SERPL-SCNC: 103 MMOL/L — SIGNIFICANT CHANGE UP (ref 98–107)
CHLORIDE SERPL-SCNC: 99 MMOL/L — SIGNIFICANT CHANGE UP (ref 98–107)
CO2 SERPL-SCNC: 21 MMOL/L — LOW (ref 22–31)
CO2 SERPL-SCNC: 24 MMOL/L — SIGNIFICANT CHANGE UP (ref 22–31)
CO2 SERPL-SCNC: 27 MMOL/L — SIGNIFICANT CHANGE UP (ref 22–31)
CREAT SERPL-MCNC: 1.11 MG/DL — SIGNIFICANT CHANGE UP (ref 0.5–1.3)
CREAT SERPL-MCNC: 1.14 MG/DL — SIGNIFICANT CHANGE UP (ref 0.5–1.3)
CREAT SERPL-MCNC: 1.42 MG/DL — HIGH (ref 0.5–1.3)
GAS PNL BLDV: 129 MMOL/L — LOW (ref 136–146)
GLUCOSE BLDV-MCNC: 120 — HIGH (ref 70–99)
GLUCOSE SERPL-MCNC: 115 MG/DL — HIGH (ref 70–99)
GLUCOSE SERPL-MCNC: 117 MG/DL — HIGH (ref 70–99)
GLUCOSE SERPL-MCNC: 117 MG/DL — HIGH (ref 70–99)
HCO3 BLDV-SCNC: 26 MMOL/L — SIGNIFICANT CHANGE UP (ref 20–27)
HCT VFR BLD CALC: 30.9 % — LOW (ref 39–50)
HCT VFR BLD CALC: 33.2 % — LOW (ref 39–50)
HCT VFR BLD CALC: 33.8 % — LOW (ref 39–50)
HCT VFR BLDV CALC: 31.1 % — LOW (ref 39–51)
HGB BLD-MCNC: 10.9 G/DL — LOW (ref 13–17)
HGB BLD-MCNC: 10.9 G/DL — LOW (ref 13–17)
HGB BLD-MCNC: 9.9 G/DL — LOW (ref 13–17)
HGB BLDV-MCNC: 10.1 G/DL — LOW (ref 13–17)
LACTATE BLDV-MCNC: 1.8 MMOL/L — SIGNIFICANT CHANGE UP (ref 0.5–2)
MAGNESIUM SERPL-MCNC: 1.9 MG/DL — SIGNIFICANT CHANGE UP (ref 1.6–2.6)
MAGNESIUM SERPL-MCNC: 2 MG/DL — SIGNIFICANT CHANGE UP (ref 1.6–2.6)
MAGNESIUM SERPL-MCNC: 2 MG/DL — SIGNIFICANT CHANGE UP (ref 1.6–2.6)
MCHC RBC-ENTMCNC: 29.5 PG — SIGNIFICANT CHANGE UP (ref 27–34)
MCHC RBC-ENTMCNC: 29.9 PG — SIGNIFICANT CHANGE UP (ref 27–34)
MCHC RBC-ENTMCNC: 29.9 PG — SIGNIFICANT CHANGE UP (ref 27–34)
MCHC RBC-ENTMCNC: 32 % — SIGNIFICANT CHANGE UP (ref 32–36)
MCHC RBC-ENTMCNC: 32.2 % — SIGNIFICANT CHANGE UP (ref 32–36)
MCHC RBC-ENTMCNC: 32.8 % — SIGNIFICANT CHANGE UP (ref 32–36)
MCV RBC AUTO: 91.2 FL — SIGNIFICANT CHANGE UP (ref 80–100)
MCV RBC AUTO: 91.4 FL — SIGNIFICANT CHANGE UP (ref 80–100)
MCV RBC AUTO: 93.4 FL — SIGNIFICANT CHANGE UP (ref 80–100)
NT-PROBNP SERPL-SCNC: 2311 PG/ML — SIGNIFICANT CHANGE UP
OB PNL STL: POSITIVE — SIGNIFICANT CHANGE UP
PCO2 BLDV: 37 MMHG — LOW (ref 41–51)
PH BLDV: 7.45 PH — HIGH (ref 7.32–7.43)
PHOSPHATE SERPL-MCNC: 2.7 MG/DL — SIGNIFICANT CHANGE UP (ref 2.5–4.5)
PHOSPHATE SERPL-MCNC: 4.1 MG/DL — SIGNIFICANT CHANGE UP (ref 2.5–4.5)
PHOSPHATE SERPL-MCNC: 4.7 MG/DL — HIGH (ref 2.5–4.5)
PLATELET # BLD AUTO: 282 K/UL — SIGNIFICANT CHANGE UP (ref 150–400)
PLATELET # BLD AUTO: 286 K/UL — SIGNIFICANT CHANGE UP (ref 150–400)
PLATELET # BLD AUTO: 288 K/UL — SIGNIFICANT CHANGE UP (ref 150–400)
PMV BLD: 10.4 FL — SIGNIFICANT CHANGE UP (ref 7–13)
PMV BLD: 10.6 FL — SIGNIFICANT CHANGE UP (ref 7–13)
PMV BLD: 10.8 FL — SIGNIFICANT CHANGE UP (ref 7–13)
PO2 BLDV: 48 MMHG — HIGH (ref 35–40)
POTASSIUM BLDV-SCNC: 3.7 MMOL/L — SIGNIFICANT CHANGE UP (ref 3.4–4.5)
POTASSIUM SERPL-MCNC: 4.1 MMOL/L — SIGNIFICANT CHANGE UP (ref 3.5–5.3)
POTASSIUM SERPL-MCNC: 4.2 MMOL/L — SIGNIFICANT CHANGE UP (ref 3.5–5.3)
POTASSIUM SERPL-MCNC: 4.3 MMOL/L — SIGNIFICANT CHANGE UP (ref 3.5–5.3)
POTASSIUM SERPL-SCNC: 4.1 MMOL/L — SIGNIFICANT CHANGE UP (ref 3.5–5.3)
POTASSIUM SERPL-SCNC: 4.2 MMOL/L — SIGNIFICANT CHANGE UP (ref 3.5–5.3)
POTASSIUM SERPL-SCNC: 4.3 MMOL/L — SIGNIFICANT CHANGE UP (ref 3.5–5.3)
PROT SERPL-MCNC: 5.8 G/DL — LOW (ref 6–8.3)
PROT SERPL-MCNC: 6.4 G/DL — SIGNIFICANT CHANGE UP (ref 6–8.3)
RBC # BLD: 3.31 M/UL — LOW (ref 4.2–5.8)
RBC # BLD: 3.64 M/UL — LOW (ref 4.2–5.8)
RBC # BLD: 3.7 M/UL — LOW (ref 4.2–5.8)
RBC # FLD: 18.4 % — HIGH (ref 10.3–14.5)
RBC # FLD: 18.8 % — HIGH (ref 10.3–14.5)
RBC # FLD: 19.4 % — HIGH (ref 10.3–14.5)
RH IG SCN BLD-IMP: POSITIVE — SIGNIFICANT CHANGE UP
SAO2 % BLDV: 82.1 % — SIGNIFICANT CHANGE UP (ref 60–85)
SODIUM SERPL-SCNC: 136 MMOL/L — SIGNIFICANT CHANGE UP (ref 135–145)
SODIUM SERPL-SCNC: 138 MMOL/L — SIGNIFICANT CHANGE UP (ref 135–145)
SODIUM SERPL-SCNC: 141 MMOL/L — SIGNIFICANT CHANGE UP (ref 135–145)
WBC # BLD: 7.88 K/UL — SIGNIFICANT CHANGE UP (ref 3.8–10.5)
WBC # BLD: 9.18 K/UL — SIGNIFICANT CHANGE UP (ref 3.8–10.5)
WBC # BLD: 9.5 K/UL — SIGNIFICANT CHANGE UP (ref 3.8–10.5)
WBC # FLD AUTO: 7.88 K/UL — SIGNIFICANT CHANGE UP (ref 3.8–10.5)
WBC # FLD AUTO: 9.18 K/UL — SIGNIFICANT CHANGE UP (ref 3.8–10.5)
WBC # FLD AUTO: 9.5 K/UL — SIGNIFICANT CHANGE UP (ref 3.8–10.5)

## 2017-04-07 PROCEDURE — 99232 SBSQ HOSP IP/OBS MODERATE 35: CPT

## 2017-04-07 PROCEDURE — 74174 CTA ABD&PLVS W/CONTRAST: CPT | Mod: 26

## 2017-04-07 PROCEDURE — 99232 SBSQ HOSP IP/OBS MODERATE 35: CPT | Mod: GC

## 2017-04-07 PROCEDURE — 99222 1ST HOSP IP/OBS MODERATE 55: CPT | Mod: GC

## 2017-04-07 PROCEDURE — 93010 ELECTROCARDIOGRAM REPORT: CPT

## 2017-04-07 RX ORDER — PANTOPRAZOLE SODIUM 20 MG/1
40 TABLET, DELAYED RELEASE ORAL EVERY 12 HOURS
Qty: 0 | Refills: 0 | Status: DISCONTINUED | OUTPATIENT
Start: 2017-04-07 | End: 2017-04-13

## 2017-04-07 RX ORDER — SODIUM BICARBONATE 1 MEQ/ML
50 SYRINGE (ML) INTRAVENOUS ONCE
Qty: 0 | Refills: 0 | Status: COMPLETED | OUTPATIENT
Start: 2017-04-07 | End: 2017-04-07

## 2017-04-07 RX ORDER — PANTOPRAZOLE SODIUM 20 MG/1
8 TABLET, DELAYED RELEASE ORAL
Qty: 80 | Refills: 0 | Status: DISCONTINUED | OUTPATIENT
Start: 2017-04-07 | End: 2017-04-07

## 2017-04-07 RX ADMIN — MEXILETINE HYDROCHLORIDE 150 MILLIGRAM(S): 150 CAPSULE ORAL at 16:37

## 2017-04-07 RX ADMIN — Medication 50 MILLIEQUIVALENT(S): at 02:06

## 2017-04-07 RX ADMIN — Medication 1 MILLIGRAM(S): at 12:14

## 2017-04-07 RX ADMIN — MEXILETINE HYDROCHLORIDE 150 MILLIGRAM(S): 150 CAPSULE ORAL at 21:46

## 2017-04-07 RX ADMIN — MEXILETINE HYDROCHLORIDE 150 MILLIGRAM(S): 150 CAPSULE ORAL at 06:28

## 2017-04-07 RX ADMIN — PANTOPRAZOLE SODIUM 10 MG/HR: 20 TABLET, DELAYED RELEASE ORAL at 01:10

## 2017-04-07 RX ADMIN — Medication 10.89 MICROGRAM(S)/KG/MIN: at 21:46

## 2017-04-07 RX ADMIN — PANTOPRAZOLE SODIUM 40 MILLIGRAM(S): 20 TABLET, DELAYED RELEASE ORAL at 17:52

## 2017-04-07 NOTE — PHYSICAL THERAPY INITIAL EVALUATION ADULT - ADDITIONAL COMMENTS
Pt reports that he lives alone in house with 7 steps to enter, no steps within the home, ambulated independently with straight cane.

## 2017-04-07 NOTE — PHYSICAL THERAPY INITIAL EVALUATION ADULT - PERTINENT HX OF CURRENT PROBLEM, REHAB EVAL
65 y/o M with h/o CHF (EF 25%) , Non-ischemic cardiomyopathy s/p AICD, a. fib on xarelto, HTN, V. fib, SVT, presented to the ED for AICD firing

## 2017-04-07 NOTE — PROVIDER CONTACT NOTE (OTHER) - ACTION/TREATMENT ORDERED:
Type and screen, Cat scan of Abdomen stat, 1 unit of pack cell transfuse over 4 hrs, NPO after midnight except meds

## 2017-04-07 NOTE — DIETITIAN INITIAL EVALUATION ADULT. - OTHER INFO
Pt. alert, oriented , tolerating PO , no recent wt. change , no edema , verbalized diet well was recently in hospital

## 2017-04-08 LAB
ALBUMIN SERPL ELPH-MCNC: 3.6 G/DL — SIGNIFICANT CHANGE UP (ref 3.3–5)
ALP SERPL-CCNC: 83 U/L — SIGNIFICANT CHANGE UP (ref 40–120)
ALT FLD-CCNC: 197 U/L — HIGH (ref 4–41)
ALT FLD-CCNC: 214 U/L — HIGH (ref 4–41)
AST SERPL-CCNC: 114 U/L — HIGH (ref 4–40)
AST SERPL-CCNC: 93 U/L — HIGH (ref 4–40)
BILIRUB SERPL-MCNC: 1.6 MG/DL — HIGH (ref 0.2–1.2)
BUN SERPL-MCNC: 32 MG/DL — HIGH (ref 7–23)
BUN SERPL-MCNC: 34 MG/DL — HIGH (ref 7–23)
BUN SERPL-MCNC: 37 MG/DL — HIGH (ref 7–23)
CALCIUM SERPL-MCNC: 8.5 MG/DL — SIGNIFICANT CHANGE UP (ref 8.4–10.5)
CALCIUM SERPL-MCNC: 9.1 MG/DL — SIGNIFICANT CHANGE UP (ref 8.4–10.5)
CALCIUM SERPL-MCNC: 9.2 MG/DL — SIGNIFICANT CHANGE UP (ref 8.4–10.5)
CHLORIDE SERPL-SCNC: 96 MMOL/L — LOW (ref 98–107)
CHLORIDE SERPL-SCNC: 97 MMOL/L — LOW (ref 98–107)
CHLORIDE SERPL-SCNC: 98 MMOL/L — SIGNIFICANT CHANGE UP (ref 98–107)
CO2 SERPL-SCNC: 23 MMOL/L — SIGNIFICANT CHANGE UP (ref 22–31)
CO2 SERPL-SCNC: 24 MMOL/L — SIGNIFICANT CHANGE UP (ref 22–31)
CO2 SERPL-SCNC: 26 MMOL/L — SIGNIFICANT CHANGE UP (ref 22–31)
CREAT SERPL-MCNC: 1.28 MG/DL — SIGNIFICANT CHANGE UP (ref 0.5–1.3)
CREAT SERPL-MCNC: 1.39 MG/DL — HIGH (ref 0.5–1.3)
CREAT SERPL-MCNC: 1.44 MG/DL — HIGH (ref 0.5–1.3)
GLUCOSE SERPL-MCNC: 210 MG/DL — HIGH (ref 70–99)
GLUCOSE SERPL-MCNC: 82 MG/DL — SIGNIFICANT CHANGE UP (ref 70–99)
GLUCOSE SERPL-MCNC: 90 MG/DL — SIGNIFICANT CHANGE UP (ref 70–99)
HCT VFR BLD CALC: 31.7 % — LOW (ref 39–50)
HCT VFR BLD CALC: 33.5 % — LOW (ref 39–50)
HGB BLD-MCNC: 10.4 G/DL — LOW (ref 13–17)
HGB BLD-MCNC: 11.1 G/DL — LOW (ref 13–17)
MAGNESIUM SERPL-MCNC: 1.7 MG/DL — SIGNIFICANT CHANGE UP (ref 1.6–2.6)
MAGNESIUM SERPL-MCNC: 2 MG/DL — SIGNIFICANT CHANGE UP (ref 1.6–2.6)
MAGNESIUM SERPL-MCNC: 2 MG/DL — SIGNIFICANT CHANGE UP (ref 1.6–2.6)
MCHC RBC-ENTMCNC: 30.1 PG — SIGNIFICANT CHANGE UP (ref 27–34)
MCHC RBC-ENTMCNC: 30.2 PG — SIGNIFICANT CHANGE UP (ref 27–34)
MCHC RBC-ENTMCNC: 32.8 % — SIGNIFICANT CHANGE UP (ref 32–36)
MCHC RBC-ENTMCNC: 33.1 % — SIGNIFICANT CHANGE UP (ref 32–36)
MCV RBC AUTO: 91 FL — SIGNIFICANT CHANGE UP (ref 80–100)
MCV RBC AUTO: 91.6 FL — SIGNIFICANT CHANGE UP (ref 80–100)
PHOSPHATE SERPL-MCNC: 3.9 MG/DL — SIGNIFICANT CHANGE UP (ref 2.5–4.5)
PHOSPHATE SERPL-MCNC: 4.7 MG/DL — HIGH (ref 2.5–4.5)
PHOSPHATE SERPL-MCNC: 5.3 MG/DL — HIGH (ref 2.5–4.5)
PLATELET # BLD AUTO: 279 K/UL — SIGNIFICANT CHANGE UP (ref 150–400)
PLATELET # BLD AUTO: 293 K/UL — SIGNIFICANT CHANGE UP (ref 150–400)
PMV BLD: 10.2 FL — SIGNIFICANT CHANGE UP (ref 7–13)
PMV BLD: 10.3 FL — SIGNIFICANT CHANGE UP (ref 7–13)
POTASSIUM SERPL-MCNC: 3.8 MMOL/L — SIGNIFICANT CHANGE UP (ref 3.5–5.3)
POTASSIUM SERPL-MCNC: 3.8 MMOL/L — SIGNIFICANT CHANGE UP (ref 3.5–5.3)
POTASSIUM SERPL-MCNC: 3.9 MMOL/L — SIGNIFICANT CHANGE UP (ref 3.5–5.3)
POTASSIUM SERPL-SCNC: 3.8 MMOL/L — SIGNIFICANT CHANGE UP (ref 3.5–5.3)
POTASSIUM SERPL-SCNC: 3.8 MMOL/L — SIGNIFICANT CHANGE UP (ref 3.5–5.3)
POTASSIUM SERPL-SCNC: 3.9 MMOL/L — SIGNIFICANT CHANGE UP (ref 3.5–5.3)
PROT SERPL-MCNC: 7.3 G/DL — SIGNIFICANT CHANGE UP (ref 6–8.3)
RBC # BLD: 3.46 M/UL — LOW (ref 4.2–5.8)
RBC # BLD: 3.68 M/UL — LOW (ref 4.2–5.8)
RBC # FLD: 19.4 % — HIGH (ref 10.3–14.5)
RBC # FLD: 19.6 % — HIGH (ref 10.3–14.5)
SODIUM SERPL-SCNC: 136 MMOL/L — SIGNIFICANT CHANGE UP (ref 135–145)
SODIUM SERPL-SCNC: 140 MMOL/L — SIGNIFICANT CHANGE UP (ref 135–145)
SODIUM SERPL-SCNC: 141 MMOL/L — SIGNIFICANT CHANGE UP (ref 135–145)
WBC # BLD: 10.59 K/UL — HIGH (ref 3.8–10.5)
WBC # BLD: 9.63 K/UL — SIGNIFICANT CHANGE UP (ref 3.8–10.5)
WBC # FLD AUTO: 10.59 K/UL — HIGH (ref 3.8–10.5)
WBC # FLD AUTO: 9.63 K/UL — SIGNIFICANT CHANGE UP (ref 3.8–10.5)

## 2017-04-08 PROCEDURE — 99232 SBSQ HOSP IP/OBS MODERATE 35: CPT

## 2017-04-08 PROCEDURE — 93010 ELECTROCARDIOGRAM REPORT: CPT

## 2017-04-08 PROCEDURE — 99233 SBSQ HOSP IP/OBS HIGH 50: CPT | Mod: GC

## 2017-04-08 RX ORDER — METOPROLOL TARTRATE 50 MG
2.5 TABLET ORAL ONCE
Qty: 0 | Refills: 0 | Status: COMPLETED | OUTPATIENT
Start: 2017-04-08 | End: 2017-04-08

## 2017-04-08 RX ORDER — MAGNESIUM SULFATE 500 MG/ML
2 VIAL (ML) INJECTION ONCE
Qty: 0 | Refills: 0 | Status: DISCONTINUED | OUTPATIENT
Start: 2017-04-08 | End: 2017-04-08

## 2017-04-08 RX ORDER — MAGNESIUM SULFATE 500 MG/ML
2 VIAL (ML) INJECTION ONCE
Qty: 0 | Refills: 0 | Status: COMPLETED | OUTPATIENT
Start: 2017-04-08 | End: 2017-04-08

## 2017-04-08 RX ORDER — POTASSIUM CHLORIDE 20 MEQ
20 PACKET (EA) ORAL ONCE
Qty: 0 | Refills: 0 | Status: COMPLETED | OUTPATIENT
Start: 2017-04-08 | End: 2017-04-08

## 2017-04-08 RX ORDER — POTASSIUM CHLORIDE 20 MEQ
20 PACKET (EA) ORAL ONCE
Qty: 0 | Refills: 0 | Status: DISCONTINUED | OUTPATIENT
Start: 2017-04-08 | End: 2017-04-08

## 2017-04-08 RX ORDER — AMIODARONE HYDROCHLORIDE 400 MG/1
150 TABLET ORAL ONCE
Qty: 0 | Refills: 0 | Status: COMPLETED | OUTPATIENT
Start: 2017-04-08 | End: 2017-04-08

## 2017-04-08 RX ADMIN — MEXILETINE HYDROCHLORIDE 150 MILLIGRAM(S): 150 CAPSULE ORAL at 06:27

## 2017-04-08 RX ADMIN — Medication 10.89 MICROGRAM(S)/KG/MIN: at 21:05

## 2017-04-08 RX ADMIN — MEXILETINE HYDROCHLORIDE 150 MILLIGRAM(S): 150 CAPSULE ORAL at 13:10

## 2017-04-08 RX ADMIN — Medication 20 MILLIEQUIVALENT(S): at 17:03

## 2017-04-08 RX ADMIN — Medication 20 MILLIEQUIVALENT(S): at 22:50

## 2017-04-08 RX ADMIN — PANTOPRAZOLE SODIUM 40 MILLIGRAM(S): 20 TABLET, DELAYED RELEASE ORAL at 06:27

## 2017-04-08 RX ADMIN — Medication 20 MILLIEQUIVALENT(S): at 06:27

## 2017-04-08 RX ADMIN — MEXILETINE HYDROCHLORIDE 150 MILLIGRAM(S): 150 CAPSULE ORAL at 21:05

## 2017-04-08 RX ADMIN — Medication 50 GRAM(S): at 22:50

## 2017-04-08 RX ADMIN — Medication 1 MILLIGRAM(S): at 13:10

## 2017-04-08 RX ADMIN — Medication 10.89 MICROGRAM(S)/KG/MIN: at 07:50

## 2017-04-08 RX ADMIN — Medication 2.5 MG/HR: at 07:50

## 2017-04-08 RX ADMIN — Medication 2.5 MILLIGRAM(S): at 18:16

## 2017-04-08 RX ADMIN — Medication 100 MILLIGRAM(S): at 13:10

## 2017-04-08 RX ADMIN — AMIODARONE HYDROCHLORIDE 618 MILLIGRAM(S): 400 TABLET ORAL at 19:06

## 2017-04-08 RX ADMIN — PANTOPRAZOLE SODIUM 40 MILLIGRAM(S): 20 TABLET, DELAYED RELEASE ORAL at 17:04

## 2017-04-08 NOTE — PROVIDER CONTACT NOTE (OTHER) - RECOMMENDATIONS
Primacor decrease to 0.125 mcg
Primacor decrease to 0.125 mcg/kg/min or DC and start of Vasopressin and Dobutamine drip
stool for occult blood, Type and Screen
12 lead EKG, supplement with Mag
EKG performed, MD notified, MD in to assess patient.  Will continue to monitor closely.
no new orders obtained

## 2017-04-08 NOTE — PROVIDER CONTACT NOTE (OTHER) - ACTION/TREATMENT ORDERED:
MD Elvis notified of change in rhythm afib 120-130s.  EKG performed and reviewed with MD.  Lopressor ordered.  Will continue to monitor closely.

## 2017-04-09 LAB
BUN SERPL-MCNC: 26 MG/DL — HIGH (ref 7–23)
CALCIUM SERPL-MCNC: 8.4 MG/DL — SIGNIFICANT CHANGE UP (ref 8.4–10.5)
CHLORIDE SERPL-SCNC: 95 MMOL/L — LOW (ref 98–107)
CO2 SERPL-SCNC: 25 MMOL/L — SIGNIFICANT CHANGE UP (ref 22–31)
CREAT SERPL-MCNC: 1.23 MG/DL — SIGNIFICANT CHANGE UP (ref 0.5–1.3)
GLUCOSE SERPL-MCNC: 222 MG/DL — HIGH (ref 70–99)
HCT VFR BLD CALC: 31.6 % — LOW (ref 39–50)
HCT VFR BLD CALC: 33.3 % — LOW (ref 39–50)
HGB BLD-MCNC: 10.4 G/DL — LOW (ref 13–17)
HGB BLD-MCNC: 10.9 G/DL — LOW (ref 13–17)
MAGNESIUM SERPL-MCNC: 2.4 MG/DL — SIGNIFICANT CHANGE UP (ref 1.6–2.6)
MCHC RBC-ENTMCNC: 30.3 PG — SIGNIFICANT CHANGE UP (ref 27–34)
MCHC RBC-ENTMCNC: 30.4 PG — SIGNIFICANT CHANGE UP (ref 27–34)
MCHC RBC-ENTMCNC: 32.7 % — SIGNIFICANT CHANGE UP (ref 32–36)
MCHC RBC-ENTMCNC: 32.9 % — SIGNIFICANT CHANGE UP (ref 32–36)
MCV RBC AUTO: 92.1 FL — SIGNIFICANT CHANGE UP (ref 80–100)
MCV RBC AUTO: 93 FL — SIGNIFICANT CHANGE UP (ref 80–100)
PHOSPHATE SERPL-MCNC: 3.6 MG/DL — SIGNIFICANT CHANGE UP (ref 2.5–4.5)
PLATELET # BLD AUTO: 284 K/UL — SIGNIFICANT CHANGE UP (ref 150–400)
PLATELET # BLD AUTO: 307 K/UL — SIGNIFICANT CHANGE UP (ref 150–400)
PMV BLD: 9.9 FL — SIGNIFICANT CHANGE UP (ref 7–13)
PMV BLD: 9.9 FL — SIGNIFICANT CHANGE UP (ref 7–13)
POTASSIUM SERPL-MCNC: 4 MMOL/L — SIGNIFICANT CHANGE UP (ref 3.5–5.3)
POTASSIUM SERPL-SCNC: 4 MMOL/L — SIGNIFICANT CHANGE UP (ref 3.5–5.3)
RBC # BLD: 3.43 M/UL — LOW (ref 4.2–5.8)
RBC # BLD: 3.58 M/UL — LOW (ref 4.2–5.8)
RBC # FLD: 19.4 % — HIGH (ref 10.3–14.5)
RBC # FLD: 19.5 % — HIGH (ref 10.3–14.5)
SODIUM SERPL-SCNC: 134 MMOL/L — LOW (ref 135–145)
WBC # BLD: 8.38 K/UL — SIGNIFICANT CHANGE UP (ref 3.8–10.5)
WBC # BLD: 9.29 K/UL — SIGNIFICANT CHANGE UP (ref 3.8–10.5)
WBC # FLD AUTO: 8.38 K/UL — SIGNIFICANT CHANGE UP (ref 3.8–10.5)
WBC # FLD AUTO: 9.29 K/UL — SIGNIFICANT CHANGE UP (ref 3.8–10.5)

## 2017-04-09 PROCEDURE — 99233 SBSQ HOSP IP/OBS HIGH 50: CPT | Mod: GC

## 2017-04-09 RX ADMIN — PANTOPRAZOLE SODIUM 40 MILLIGRAM(S): 20 TABLET, DELAYED RELEASE ORAL at 17:46

## 2017-04-09 RX ADMIN — MEXILETINE HYDROCHLORIDE 150 MILLIGRAM(S): 150 CAPSULE ORAL at 06:33

## 2017-04-09 RX ADMIN — Medication 10.89 MICROGRAM(S)/KG/MIN: at 21:57

## 2017-04-09 RX ADMIN — Medication 2.5 MG/HR: at 21:57

## 2017-04-09 RX ADMIN — MEXILETINE HYDROCHLORIDE 150 MILLIGRAM(S): 150 CAPSULE ORAL at 21:56

## 2017-04-09 RX ADMIN — Medication 1 MILLIGRAM(S): at 11:38

## 2017-04-09 RX ADMIN — PANTOPRAZOLE SODIUM 40 MILLIGRAM(S): 20 TABLET, DELAYED RELEASE ORAL at 06:33

## 2017-04-09 RX ADMIN — Medication 100 MILLIGRAM(S): at 14:10

## 2017-04-09 RX ADMIN — MEXILETINE HYDROCHLORIDE 150 MILLIGRAM(S): 150 CAPSULE ORAL at 14:10

## 2017-04-09 RX ADMIN — Medication 100 MILLIGRAM(S): at 06:33

## 2017-04-10 ENCOUNTER — TRANSCRIPTION ENCOUNTER (OUTPATIENT)
Age: 67
End: 2017-04-10

## 2017-04-10 LAB
ALBUMIN SERPL ELPH-MCNC: 3.5 G/DL — SIGNIFICANT CHANGE UP (ref 3.3–5)
ALP SERPL-CCNC: 91 U/L — SIGNIFICANT CHANGE UP (ref 40–120)
ALT FLD-CCNC: 139 U/L — HIGH (ref 4–41)
APTT BLD: 27.6 SEC — SIGNIFICANT CHANGE UP (ref 27.5–37.4)
AST SERPL-CCNC: 64 U/L — HIGH (ref 4–40)
BILIRUB SERPL-MCNC: 1.3 MG/DL — HIGH (ref 0.2–1.2)
BUN SERPL-MCNC: 17 MG/DL — SIGNIFICANT CHANGE UP (ref 7–23)
BUN SERPL-MCNC: 19 MG/DL — SIGNIFICANT CHANGE UP (ref 7–23)
CALCIUM SERPL-MCNC: 8.5 MG/DL — SIGNIFICANT CHANGE UP (ref 8.4–10.5)
CALCIUM SERPL-MCNC: 8.9 MG/DL — SIGNIFICANT CHANGE UP (ref 8.4–10.5)
CHLORIDE SERPL-SCNC: 95 MMOL/L — LOW (ref 98–107)
CHLORIDE SERPL-SCNC: 96 MMOL/L — LOW (ref 98–107)
CO2 SERPL-SCNC: 22 MMOL/L — SIGNIFICANT CHANGE UP (ref 22–31)
CO2 SERPL-SCNC: 27 MMOL/L — SIGNIFICANT CHANGE UP (ref 22–31)
CREAT SERPL-MCNC: 1.24 MG/DL — SIGNIFICANT CHANGE UP (ref 0.5–1.3)
CREAT SERPL-MCNC: 1.25 MG/DL — SIGNIFICANT CHANGE UP (ref 0.5–1.3)
GLUCOSE SERPL-MCNC: 150 MG/DL — HIGH (ref 70–99)
GLUCOSE SERPL-MCNC: 81 MG/DL — SIGNIFICANT CHANGE UP (ref 70–99)
HCT VFR BLD CALC: 33.9 % — LOW (ref 39–50)
HGB BLD-MCNC: 11 G/DL — LOW (ref 13–17)
INR BLD: 1.14 — SIGNIFICANT CHANGE UP (ref 0.88–1.17)
MAGNESIUM SERPL-MCNC: 1.8 MG/DL — SIGNIFICANT CHANGE UP (ref 1.6–2.6)
MAGNESIUM SERPL-MCNC: 2.1 MG/DL — SIGNIFICANT CHANGE UP (ref 1.6–2.6)
MCHC RBC-ENTMCNC: 30.3 PG — SIGNIFICANT CHANGE UP (ref 27–34)
MCHC RBC-ENTMCNC: 32.4 % — SIGNIFICANT CHANGE UP (ref 32–36)
MCV RBC AUTO: 93.4 FL — SIGNIFICANT CHANGE UP (ref 80–100)
PHOSPHATE SERPL-MCNC: 3.3 MG/DL — SIGNIFICANT CHANGE UP (ref 2.5–4.5)
PLATELET # BLD AUTO: 319 K/UL — SIGNIFICANT CHANGE UP (ref 150–400)
PMV BLD: 9.9 FL — SIGNIFICANT CHANGE UP (ref 7–13)
POTASSIUM SERPL-MCNC: 3.6 MMOL/L — SIGNIFICANT CHANGE UP (ref 3.5–5.3)
POTASSIUM SERPL-MCNC: 3.9 MMOL/L — SIGNIFICANT CHANGE UP (ref 3.5–5.3)
POTASSIUM SERPL-SCNC: 3.6 MMOL/L — SIGNIFICANT CHANGE UP (ref 3.5–5.3)
POTASSIUM SERPL-SCNC: 3.9 MMOL/L — SIGNIFICANT CHANGE UP (ref 3.5–5.3)
PROT SERPL-MCNC: 7.1 G/DL — SIGNIFICANT CHANGE UP (ref 6–8.3)
PROTHROM AB SERPL-ACNC: 12.8 SEC — SIGNIFICANT CHANGE UP (ref 9.8–13.1)
RBC # BLD: 3.63 M/UL — LOW (ref 4.2–5.8)
RBC # FLD: 19.3 % — HIGH (ref 10.3–14.5)
SODIUM SERPL-SCNC: 133 MMOL/L — LOW (ref 135–145)
SODIUM SERPL-SCNC: 137 MMOL/L — SIGNIFICANT CHANGE UP (ref 135–145)
WBC # BLD: 8.46 K/UL — SIGNIFICANT CHANGE UP (ref 3.8–10.5)
WBC # FLD AUTO: 8.46 K/UL — SIGNIFICANT CHANGE UP (ref 3.8–10.5)

## 2017-04-10 PROCEDURE — 99232 SBSQ HOSP IP/OBS MODERATE 35: CPT | Mod: GC

## 2017-04-10 PROCEDURE — 99233 SBSQ HOSP IP/OBS HIGH 50: CPT

## 2017-04-10 PROCEDURE — 93010 ELECTROCARDIOGRAM REPORT: CPT

## 2017-04-10 RX ORDER — FUROSEMIDE 40 MG
40 TABLET ORAL DAILY
Qty: 0 | Refills: 0 | Status: DISCONTINUED | OUTPATIENT
Start: 2017-04-10 | End: 2017-04-11

## 2017-04-10 RX ORDER — FUROSEMIDE 40 MG
40 TABLET ORAL DAILY
Qty: 0 | Refills: 0 | Status: DISCONTINUED | OUTPATIENT
Start: 2017-04-10 | End: 2017-04-10

## 2017-04-10 RX ORDER — FUROSEMIDE 40 MG
40 TABLET ORAL EVERY 12 HOURS
Qty: 0 | Refills: 0 | Status: DISCONTINUED | OUTPATIENT
Start: 2017-04-10 | End: 2017-04-10

## 2017-04-10 RX ORDER — POTASSIUM CHLORIDE 20 MEQ
40 PACKET (EA) ORAL ONCE
Qty: 0 | Refills: 0 | Status: COMPLETED | OUTPATIENT
Start: 2017-04-10 | End: 2017-04-10

## 2017-04-10 RX ORDER — MAGNESIUM SULFATE 500 MG/ML
2 VIAL (ML) INJECTION ONCE
Qty: 0 | Refills: 0 | Status: COMPLETED | OUTPATIENT
Start: 2017-04-10 | End: 2017-04-10

## 2017-04-10 RX ORDER — POTASSIUM CHLORIDE 20 MEQ
20 PACKET (EA) ORAL ONCE
Qty: 0 | Refills: 0 | Status: COMPLETED | OUTPATIENT
Start: 2017-04-10 | End: 2017-04-10

## 2017-04-10 RX ADMIN — Medication 1 MILLIGRAM(S): at 13:54

## 2017-04-10 RX ADMIN — PANTOPRAZOLE SODIUM 40 MILLIGRAM(S): 20 TABLET, DELAYED RELEASE ORAL at 18:40

## 2017-04-10 RX ADMIN — Medication 100 MILLIGRAM(S): at 21:34

## 2017-04-10 RX ADMIN — Medication 40 MILLIEQUIVALENT(S): at 06:34

## 2017-04-10 RX ADMIN — Medication 40 MILLIGRAM(S): at 13:53

## 2017-04-10 RX ADMIN — Medication 10.89 MICROGRAM(S)/KG/MIN: at 08:27

## 2017-04-10 RX ADMIN — MEXILETINE HYDROCHLORIDE 150 MILLIGRAM(S): 150 CAPSULE ORAL at 13:54

## 2017-04-10 RX ADMIN — MEXILETINE HYDROCHLORIDE 150 MILLIGRAM(S): 150 CAPSULE ORAL at 06:09

## 2017-04-10 RX ADMIN — Medication 50 GRAM(S): at 06:35

## 2017-04-10 RX ADMIN — MEXILETINE HYDROCHLORIDE 150 MILLIGRAM(S): 150 CAPSULE ORAL at 21:34

## 2017-04-10 RX ADMIN — PANTOPRAZOLE SODIUM 40 MILLIGRAM(S): 20 TABLET, DELAYED RELEASE ORAL at 06:09

## 2017-04-10 NOTE — DISCHARGE NOTE ADULT - PATIENT PORTAL LINK FT
“You can access the Follow[employer] Patient Portal, offered by [facility], by registering with the following website: [email or web address]”

## 2017-04-10 NOTE — DISCHARGE NOTE ADULT - PLAN OF CARE
prevent future occurrences Continue current meds, follow up with Dr. Hurtado. Follow up with EP clinic for checking your ICD. Return to ED if any concerning signs/symptoms. Mexiltine added. Prevent future occurrence Continue current meds, Protonix 40 twice daily. Follow up with gastroenterologist. maintain symptoms Continue current meds. Daily weights. FOllow up with Dr. Hurtado in 2 weeks, Continue evaluation for LVAD as outpatient. Continue current meds, Protonix 40 twice daily. Follow up with gastroenterologist. Please follow up with your doctor regarding your virtual colposcopy results

## 2017-04-10 NOTE — DISCHARGE NOTE ADULT - CARE PLAN
Principal Discharge DX:	AICD discharge  Goal:	prevent future occurrences  Instructions for follow-up, activity and diet:	Continue current meds, follow up with Dr. Hurtado. Follow up with EP clinic for checking your ICD. Return to ED if any concerning signs/symptoms. Mexiltine added.  Secondary Diagnosis:	Melena  Goal:	Prevent future occurrence  Instructions for follow-up, activity and diet:	Continue current meds, Protonix 40 twice daily. Follow up with gastroenterologist.  Secondary Diagnosis:	CHF (Congestive Heart Failure)  Goal:	maintain symptoms  Instructions for follow-up, activity and diet:	Continue current meds. Daily weights. FOllow up with Dr. Hurtado in 2 weeks, Continue evaluation for LVAD as outpatient. Principal Discharge DX:	AICD discharge  Goal:	prevent future occurrences  Instructions for follow-up, activity and diet:	Continue current meds, follow up with Dr. Hurtado. Follow up with EP clinic for checking your ICD. Return to ED if any concerning signs/symptoms. Mexiltine added.  Secondary Diagnosis:	Melena  Goal:	Prevent future occurrence  Instructions for follow-up, activity and diet:	Continue current meds, Protonix 40 twice daily. Follow up with gastroenterologist. Please follow up with your doctor regarding your virtual colposcopy results  Secondary Diagnosis:	CHF (Congestive Heart Failure)  Goal:	maintain symptoms  Instructions for follow-up, activity and diet:	Continue current meds. Daily weights. FOllow up with Dr. Hurtado in 2 weeks, Continue evaluation for LVAD as outpatient. Principal Discharge DX:	AICD discharge  Goal:	prevent future occurrences  Instructions for follow-up, activity and diet:	Continue current meds, follow up with Dr. Hurtdao. Follow up with EP clinic for checking your ICD. Return to ED if any concerning signs/symptoms. Mexiltine added.  Secondary Diagnosis:	Melena  Goal:	Prevent future occurrence  Instructions for follow-up, activity and diet:	Continue current meds, Protonix 40 twice daily. Follow up with gastroenterologist. Please follow up with your doctor regarding your virtual colposcopy results  Secondary Diagnosis:	CHF (Congestive Heart Failure)  Goal:	maintain symptoms  Instructions for follow-up, activity and diet:	Continue current meds. Daily weights. FOllow up with Dr. Hurtado in 2 weeks, Continue evaluation for LVAD as outpatient.

## 2017-04-10 NOTE — DISCHARGE NOTE ADULT - OTHER SIGNIFICANT FINDINGS
Hospital Admission  65 y/o M with h/o CHF (EF 25%) , Non-ischemic cardiomyopathy s/p AICD, a. fib on xarelto, HTN, V. fib, SVT, presents to the ED for AICD firing. Pt states the AICD fired around 6 pm. Pt states he has been feeling dizzy and nauseous. Pt states he had abdominal pain previous however it was relieved with maalox. Pt denies LOC, syncope, fever, chills, chest pain, shortness of breath, palpitation, nausea, diarrhea, constipation, fever, headache, urinary/bowel incontinence or any other complaints at this time.     In the ED, patient is currently stable and in no acute distress.Vitals are /75,  T 97.8 HR 93, R 22, SpO2 96%. Pt is admitted to telemetry.     Hospital Course  The patient's AICD was interrogated and he was found to have VT at home prior to shock. The patient was also found to have not taken his medication in 3 days prior to admission. The patient was continued on metoprolol and an ARB and was placed on mexiletine. His potassium was maintained >4 and magnesium was maintained >2. The patient's ARB and spironolactone were held 2/2 NORMAN. The patient was seen by the renal service, and the NORMAN was determined to be from a combination of poor PO intake and hemodynamics. The patient had a right heart catheterization with a plan for cardiomems. During the right heart cath it was noticed that the patient had low cardiac output and high wedge pressure. Cardiomems was not placed and the patient was transferred to the CCU for inotrope-assisted diuresis. In the CCU the patient was on a lasix gtt and a dobutamine gtt. Repeat RHC __________.  His course was complicated by several episodes of melena with a drop in Hct and hypotension. The patient was briefly on vasopressin and phenylephrine and was transfused 1u PRBC and GI was consulted for upper GI bleed. The patient thereafter had stable H+H. CTA showed no source of active bleeding. Ultimately, GI _____________. Hospital Admission  67 y/o M with h/o CHF (EF 25%) , Non-ischemic cardiomyopathy s/p AICD, a. fib on xarelto, HTN, V. fib, SVT, presents to the ED for AICD firing. Pt states the AICD fired around 6 pm. Pt states he has been feeling dizzy and nauseous. Pt states he had abdominal pain previous however it was relieved with maalox. Pt denies LOC, syncope, fever, chills, chest pain, shortness of breath, palpitation, nausea, diarrhea, constipation, fever, headache, urinary/bowel incontinence or any other complaints at this time.     In the ED, patient is currently stable and in no acute distress.Vitals are /75,  T 97.8 HR 93, R 22, SpO2 96%. Pt is admitted to telemetry.     Hospital Course  The patient's AICD was interrogated and he was found to have VT at home prior to shock. The patient was also found to have not taken his medication in 3 days prior to admission. The patient was continued on metoprolol and an ARB and was placed on mexiletine. His potassium was maintained >4 and magnesium was maintained >2. The patient's ARB and spironolactone were held 2/2 NORMAN. The patient was seen by the renal service, and the NORMAN was determined to be from a combination of poor PO intake and hemodynamics. The patient had a right heart catheterization with a plan for cardiomems. During the right heart cath it was noticed that the patient had low cardiac output and high wedge pressure. Cardiomems was not placed and the patient was transferred to the CCU for inotrope-assisted diuresis. In the CCU the patient was on a lasix gtt and a dobutamine gtt, which were both weaned off. Repeat RHC off dobutamine showed __________.  His course was complicated by several episodes of melena with a drop in Hct and hypotension. The patient was briefly on vasopressin and phenylephrine and was transfused 1u PRBC and GI was consulted for upper GI bleed. The patient thereafter had stable H+H. CTA showed no source of active bleeding. Ultimately, GI _____________.

## 2017-04-10 NOTE — DISCHARGE NOTE ADULT - MEDICATION SUMMARY - MEDICATIONS TO TAKE
I will START or STAY ON the medications listed below when I get home from the hospital:    mexiletine 150 mg oral capsule  -- 1 cap(s) by mouth 3 times a day  -- Indication: For AICD discharge    rivaroxaban 20 mg oral tablet  -- 1 tab(s) by mouth once a day  -- Indication: For Atrial fibrillation    Metoprolol Succinate  mg oral tablet, extended release  -- 1 tab(s) by mouth once a day  -- Indication: For Hypertension    torsemide 20 mg oral tablet  -- 2 tab(s) by mouth once a day  -- Indication: For CHF (Congestive Heart Failure)    senna oral tablet  -- 2 tab(s) by mouth once a day (at bedtime)  -- Indication: For Constipation    docusate sodium 100 mg oral capsule  -- 1 cap(s) by mouth 3 times a day  -- Indication: For Constipation    pantoprazole 40 mg oral delayed release tablet  -- 1 tab(s) by mouth 2 times a day (before meals)  -- Indication: For gerd    folic acid 1 mg oral tablet  -- 1 tab(s) by mouth once a day  -- Indication: For Anemia I will START or STAY ON the medications listed below when I get home from the hospital:    mexiletine 150 mg oral capsule  -- 1 cap(s) by mouth 3 times a day  -- Indication: For AICD discharge    rivaroxaban 20 mg oral tablet  -- 1 tab(s) by mouth once a day  -- Indication: For Atrial fibrillation    Metoprolol Succinate  mg oral tablet, extended release  -- 1 tab(s) by mouth once a day  -- Indication: For Hypertension    torsemide 20 mg oral tablet  -- 2 tab(s) by mouth once a day  -- Indication: For CHF (Congestive Heart Failure)    senna oral tablet  -- 2 tab(s) by mouth once a day (at bedtime)  -- Indication: For Constipation    docusate sodium 100 mg oral capsule  -- 1 cap(s) by mouth 3 times a day  -- Indication: For Constipation    pantoprazole 40 mg oral delayed release tablet  -- 1 tab(s) by mouth 2 times a day (before meals)  -- Indication: For bleeding from stomach    folic acid 1 mg oral tablet  -- 1 tab(s) by mouth once a day  -- Indication: For Anemia I will START or STAY ON the medications listed below when I get home from the hospital:    mexiletine 150 mg oral capsule  -- 1 cap(s) by mouth 3 times a day  -- Indication: For AICD discharge    rivaroxaban 20 mg oral tablet  -- 1 tab(s) by mouth once a day  -- Indication: For Atrial fibrillation    Metoprolol Succinate ER 25 mg oral tablet, extended release  -- 0.5 tab(s) by mouth once a day  -- It is very important that you take or use this exactly as directed.  Do not skip doses or discontinue unless directed by your doctor.  May cause drowsiness.  Alcohol may intensify this effect.  Use care when operating dangerous machinery.  Some non-prescription drugs may aggravate your condition.  Read all labels carefully.  If a warning appears, check with your doctor before taking.  Swallow whole.  Do not crush.  Take with food or milk.  This drug may impair the ability to drive or operate machinery.  Use care until you become familiar with its effects.    -- Indication: For CHF (Congestive Heart Failure)    torsemide 20 mg oral tablet  -- 2 tab(s) by mouth once a day  -- Indication: For CHF (Congestive Heart Failure)    senna oral tablet  -- 2 tab(s) by mouth once a day (at bedtime)  -- Indication: For Constipation    docusate sodium 100 mg oral capsule  -- 1 cap(s) by mouth 3 times a day  -- Indication: For Constipation    pantoprazole 40 mg oral delayed release tablet  -- 1 tab(s) by mouth 2 times a day (before meals)  -- Indication: For bleeding from stomach    folic acid 1 mg oral tablet  -- 1 tab(s) by mouth once a day  -- Indication: For Anemia

## 2017-04-10 NOTE — DISCHARGE NOTE ADULT - HOSPITAL COURSE
67 y/o M with h/o CHF s/p AICD, a. fib+ on xarelto, HTN, non ischemic cardiomyopathy, SVT, V. fib presents to the ED due AICD firing.   CEX2: negative   cards consult: restart mexilitine 150 mg TID, restart metoprolol 50 mg BID, c/w xarelto, recheck phos to make sure no error given normal other settings. c/w sprinorolactone 12.5 and furosemide 40 mg qd.    CXR: clear lungs  3/30 Device Interrogation: VT s/p ATP x 3 with shocks in the setting of no meds.  Need to restart meds. Patient hadn't taken home medications in 3 days, Stressed importance of medication compliance   3/31 EP: continue BB/ARB, continue mexiletine 150mg po tid, main K>4 and Mg>2, pt had one episode of NSVT lasting 8 seconds (Torsades), pt was asymptomatic, BB was on hold 2/2 decreased BP, received mexiletine 1gm x 1, give lopressor 25mg po x 1 now   Pt with one episode of melena  Occult ordered (pt endorsed 1 episode of melena )  4/3 EP: continue BB and mexilitine, ARB on hold 2/2 increased SCr, hold colchicine, renal c/s  4/3 Renal c/s: NORMAN likely hemodynamically medicated in setting of decreased po intake, check UA/urine lytes, renal US, would consider gentle IV hydration NS 75 cc/hr x 12h   4/3 Orthostatics negative   Renal US: No hydronephrosis.   Renal: hemodynamically mediated NORMAN, SCr down to 1.87    Cards: NORMAN likely ARB/ACE and lasix induced, continue to hold    EP: continue BB   CHF c/s: RHC/cardiomems, on Thursday afternoon, hold xarelto for 48h per Dr. James   - HF note- d/c lopressor changed to toprol 50mg BID dosing , d/w EO will restart mexilitine , NORMAN improved, RHC tomorrow, restart Valsartan 40mg BID and Spironalactone 12.5mg qd, Afib holding Xarelto for Cath - no heparin gtt  4/6 PM: transferred to CCU for lasix gtt @ 5mg/hr and milrinone gtt @ 0.25 mcg/kg, valsartan and spironolactone on HOLD.  CCU course   pt remains hypotensive. mil gtt changed to  @ 2.5mcg, statred on vaso gtt, patient remain hypotensive. SBP 50's map 46; discussed with fellow,  increased to 5mcg, and added phenyephrine gtt-->map maintained at 60. Multiple melenic stools. Given protonix 40mg IVP x1 bolus. +FOB. Hgb decreasing. STAT CBC sent. GI consulted- started on protonix gtt. Pt NPO for possible bedside scope in AM, Hgb/hct downtrending with increased BMs and increased pressor requirements. Urgent CTA completed (prelim negative). 1u PRBC.d/c xarelto   : appropriate response to 1u PRBC, will check CBCq6, to be scoped by GI when stable, attempting to wean phenylephrine  -off phenylephrine and vasopressin   GI to likely perform EGD on monday. Cr stable at 1.4, UOP adequate .; Pt went into rapid afib- initially given lopressor x1 however then given amiodarone.   : plan for RHC off lasix tomorrow, EGD with dobutamine only if planning to d/c on dobutamine  will stop dobutamine in the AM, and then plan for cath on : off dobutamine, RHC tomorrow, per GI will scope on Thursday pending RHC results  -changed lasix to 40mg IV qday and started captopril.Recieved lopressor 12.5mg POx1 ,unable to recieve ACE 2/2 hypotension  : for RHC this afternoon.Wedge 14, CO 4.72, CI 2.52, PA sat 65%  : EGD revealed large gastric ulcer, will c/w BID PPI, planned for bowel prep tonight and virtual colonoscopy tomorrow   pt had virtual colonsscopy and remained HD stable post procedure, will dc to follow up as outpatient. Pt to continue LVAD eval with CHF clinic.   -restarting xarelto, has been cleared by GI 67 y/o M with h/o CHF s/p AICD, a. fib+ on xarelto, HTN, non ischemic cardiomyopathy, SVT, V. fib presents to the ED due AICD firing.   CEX2: negative   cards consult: restart mexilitine 150 mg TID, restart metoprolol 50 mg BID, c/w xarelto, recheck phos to make sure no error given normal other settings. c/w sprinorolactone 12.5 and furosemide 40 mg qd.    CXR: clear lungs  3/30 Device Interrogation: VT s/p ATP x 3 with shocks in the setting of no meds.  Need to restart meds. Patient hadn't taken home medications in 3 days, Stressed importance of medication compliance   3/31 EP: continue BB/ARB, continue mexiletine 150mg po tid, main K>4 and Mg>2, pt had one episode of NSVT lasting 8 seconds (Torsades), pt was asymptomatic, BB was on hold 2/2 decreased BP, received mexiletine 1gm x 1, give lopressor 25mg po x 1 now   Pt with one episode of melena  Occult ordered (pt endorsed 1 episode of melena )  4/3 EP: continue BB and mexilitine, ARB on hold 2/2 increased SCr, hold colchicine, renal c/s  4/3 Renal c/s: NORMAN likely hemodynamically medicated in setting of decreased po intake, check UA/urine lytes, renal US, would consider gentle IV hydration NS 75 cc/hr x 12h   4/3 Orthostatics negative   Renal US: No hydronephrosis.   Renal: hemodynamically mediated NORMAN, SCr down to 1.87    Cards: NORMAN likely ARB/ACE and lasix induced, continue to hold    EP: continue BB   CHF c/s: RHC/cardiomems, on Thursday afternoon, hold xarelto for 48h per Dr. James   - HF note- d/c lopressor changed to toprol 50mg BID dosing , d/w EO will restart mexilitine , NORMAN improved, RHC tomorrow, restart Valsartan 40mg BID and Spironalactone 12.5mg qd, Afib holding Xarelto for Cath - no heparin gtt  4/6 PM: transferred to CCU for lasix gtt @ 5mg/hr and milrinone gtt @ 0.25 mcg/kg, valsartan and spironolactone on HOLD.  CCU course   pt remains hypotensive. mil gtt changed to  @ 2.5mcg, statred on vaso gtt, patient remain hypotensive. SBP 50's map 46; discussed with fellow,  increased to 5mcg, and added phenyephrine gtt-->map maintained at 60. Multiple melenic stools. Given protonix 40mg IVP x1 bolus. +FOB. Hgb decreasing. STAT CBC sent.   GI consulted- started on protonix gtt. Pt NPO for possible bedside scope in AM, Hgb/hct downtrending with increased BMs and increased pressor requirements. Urgent CTA completed (prelim negative). 1u PRBC.d/c xarelto   : appropriate response to 1u PRBC, will check CBCq6, to be scoped by GI when stable, attempting to wean phenylephrine  -off phenylephrine and vasopressin   GI to likely perform EGD on monday. Cr stable at 1.4, UOP adequate .; Pt went into rapid afib- initially given lopressor x1 however then given amiodarone.   : plan for RHC off lasix tomorrow, EGD with dobutamine only if planning to d/c on dobutamine  will stop dobutamine in the AM, and then plan for cath on : off dobutamine, RHC tomorrow, per GI will scope on Thursday pending RHC results  -changed lasix to 40mg IV qday and started captopril.Recieved lopressor 12.5mg POx1 ,unable to recieve ACE 2/2 hypotension  : for RHC this afternoon.Wedge 14, CO 4.72, CI 2.52, PA sat 65%  : EGD revealed large gastric ulcer, will c/w BID PPI, planned for bowel prep tonight and virtual colonoscopy tomorrow   pt had virtual colonsscopy and remained HD stable post procedure, will dc to follow up as outpatient. Pt to continue LVAD eval with CHF clinic.   -restarting xarelto, has been cleared by GI

## 2017-04-10 NOTE — DISCHARGE NOTE ADULT - MEDICATION SUMMARY - MEDICATIONS TO STOP TAKING
I will STOP taking the medications listed below when I get home from the hospital:    colchicine 0.6 mg oral tablet  -- 1 tab(s) by mouth once a day    furosemide 40 mg oral tablet  -- 1 tab(s) by mouth once a day    spironolactone 25 mg oral tablet  -- 0.5 tab(s) by mouth once a day    metoprolol tartrate 50 mg oral tablet  -- 1 tab(s) by mouth 2 times a day

## 2017-04-10 NOTE — DISCHARGE NOTE ADULT - CARE PROVIDERS DIRECT ADDRESSES
[email or web address]

## 2017-04-10 NOTE — DISCHARGE NOTE ADULT - CARE PROVIDER_API CALL
Júnior Hurtado), Adv Heart Fail Trnsplnt Cardio  [address] [place] [address]  Phone: [phone]  Fax: [phone]    Dakota Hameed (MD), Internal Medicine  [address]  [place] [address]  Phone: [phone]  Fax: [phone]    Ricky Kessler), Cardiac Electrophysiology; Cardiovascular Disease; Internal Medicine  [address] [place] [address]  Phone: [phone]  Fax: [phone]

## 2017-04-11 LAB
BUN SERPL-MCNC: 18 MG/DL — SIGNIFICANT CHANGE UP (ref 7–23)
CALCIUM SERPL-MCNC: 8.7 MG/DL — SIGNIFICANT CHANGE UP (ref 8.4–10.5)
CHLORIDE SERPL-SCNC: 96 MMOL/L — LOW (ref 98–107)
CO2 SERPL-SCNC: 25 MMOL/L — SIGNIFICANT CHANGE UP (ref 22–31)
CREAT SERPL-MCNC: 1.16 MG/DL — SIGNIFICANT CHANGE UP (ref 0.5–1.3)
GLUCOSE SERPL-MCNC: 89 MG/DL — SIGNIFICANT CHANGE UP (ref 70–99)
HCT VFR BLD CALC: 34.4 % — LOW (ref 39–50)
HGB BLD-MCNC: 11.4 G/DL — LOW (ref 13–17)
MAGNESIUM SERPL-MCNC: 2.2 MG/DL — SIGNIFICANT CHANGE UP (ref 1.6–2.6)
MCHC RBC-ENTMCNC: 30.3 PG — SIGNIFICANT CHANGE UP (ref 27–34)
MCHC RBC-ENTMCNC: 33.1 % — SIGNIFICANT CHANGE UP (ref 32–36)
MCV RBC AUTO: 91.5 FL — SIGNIFICANT CHANGE UP (ref 80–100)
PHOSPHATE SERPL-MCNC: 3.1 MG/DL — SIGNIFICANT CHANGE UP (ref 2.5–4.5)
PLATELET # BLD AUTO: 328 K/UL — SIGNIFICANT CHANGE UP (ref 150–400)
PMV BLD: 9.6 FL — SIGNIFICANT CHANGE UP (ref 7–13)
POTASSIUM SERPL-MCNC: 4.1 MMOL/L — SIGNIFICANT CHANGE UP (ref 3.5–5.3)
POTASSIUM SERPL-SCNC: 4.1 MMOL/L — SIGNIFICANT CHANGE UP (ref 3.5–5.3)
RBC # BLD: 3.76 M/UL — LOW (ref 4.2–5.8)
RBC # FLD: 18.9 % — HIGH (ref 10.3–14.5)
SODIUM SERPL-SCNC: 135 MMOL/L — SIGNIFICANT CHANGE UP (ref 135–145)
WBC # BLD: 9.12 K/UL — SIGNIFICANT CHANGE UP (ref 3.8–10.5)
WBC # FLD AUTO: 9.12 K/UL — SIGNIFICANT CHANGE UP (ref 3.8–10.5)

## 2017-04-11 PROCEDURE — 99232 SBSQ HOSP IP/OBS MODERATE 35: CPT | Mod: GC

## 2017-04-11 PROCEDURE — 93010 ELECTROCARDIOGRAM REPORT: CPT

## 2017-04-11 PROCEDURE — 99232 SBSQ HOSP IP/OBS MODERATE 35: CPT

## 2017-04-11 RX ORDER — CAPTOPRIL 12.5 MG/1
6.25 TABLET ORAL THREE TIMES A DAY
Qty: 0 | Refills: 0 | Status: DISCONTINUED | OUTPATIENT
Start: 2017-04-11 | End: 2017-04-12

## 2017-04-11 RX ORDER — METOPROLOL TARTRATE 50 MG
12.5 TABLET ORAL ONCE
Qty: 0 | Refills: 0 | Status: COMPLETED | OUTPATIENT
Start: 2017-04-11 | End: 2017-04-11

## 2017-04-11 RX ORDER — FUROSEMIDE 40 MG
40 TABLET ORAL DAILY
Qty: 0 | Refills: 0 | Status: DISCONTINUED | OUTPATIENT
Start: 2017-04-11 | End: 2017-04-12

## 2017-04-11 RX ADMIN — SENNA PLUS 2 TABLET(S): 8.6 TABLET ORAL at 22:19

## 2017-04-11 RX ADMIN — Medication 3 MILLIGRAM(S): at 22:18

## 2017-04-11 RX ADMIN — PANTOPRAZOLE SODIUM 40 MILLIGRAM(S): 20 TABLET, DELAYED RELEASE ORAL at 06:12

## 2017-04-11 RX ADMIN — Medication 20 MILLIEQUIVALENT(S): at 00:14

## 2017-04-11 RX ADMIN — MEXILETINE HYDROCHLORIDE 150 MILLIGRAM(S): 150 CAPSULE ORAL at 06:12

## 2017-04-11 RX ADMIN — Medication 1 MILLIGRAM(S): at 13:20

## 2017-04-11 RX ADMIN — Medication 40 MILLIGRAM(S): at 13:20

## 2017-04-11 RX ADMIN — MEXILETINE HYDROCHLORIDE 150 MILLIGRAM(S): 150 CAPSULE ORAL at 13:20

## 2017-04-11 RX ADMIN — Medication 100 MILLIGRAM(S): at 22:18

## 2017-04-11 RX ADMIN — Medication 100 MILLIGRAM(S): at 06:12

## 2017-04-11 RX ADMIN — Medication 40 MILLIGRAM(S): at 06:11

## 2017-04-11 RX ADMIN — PANTOPRAZOLE SODIUM 40 MILLIGRAM(S): 20 TABLET, DELAYED RELEASE ORAL at 18:52

## 2017-04-11 RX ADMIN — MEXILETINE HYDROCHLORIDE 150 MILLIGRAM(S): 150 CAPSULE ORAL at 22:18

## 2017-04-11 RX ADMIN — CAPTOPRIL 6.25 MILLIGRAM(S): 12.5 TABLET ORAL at 13:20

## 2017-04-12 ENCOUNTER — RESULT REVIEW (OUTPATIENT)
Age: 67
End: 2017-04-12

## 2017-04-12 LAB
ALBUMIN SERPL ELPH-MCNC: 3.2 G/DL — LOW (ref 3.3–5)
ALP SERPL-CCNC: 105 U/L — SIGNIFICANT CHANGE UP (ref 40–120)
ALT FLD-CCNC: 92 U/L — HIGH (ref 4–41)
APTT BLD: 27.8 SEC — SIGNIFICANT CHANGE UP (ref 27.5–37.4)
AST SERPL-CCNC: 50 U/L — HIGH (ref 4–40)
BILIRUB SERPL-MCNC: 0.7 MG/DL — SIGNIFICANT CHANGE UP (ref 0.2–1.2)
BLD GP AB SCN SERPL QL: NEGATIVE — SIGNIFICANT CHANGE UP
BUN SERPL-MCNC: 20 MG/DL — SIGNIFICANT CHANGE UP (ref 7–23)
CALCIUM SERPL-MCNC: 8.8 MG/DL — SIGNIFICANT CHANGE UP (ref 8.4–10.5)
CHLORIDE SERPL-SCNC: 96 MMOL/L — LOW (ref 98–107)
CO2 SERPL-SCNC: 25 MMOL/L — SIGNIFICANT CHANGE UP (ref 22–31)
CREAT SERPL-MCNC: 1.06 MG/DL — SIGNIFICANT CHANGE UP (ref 0.5–1.3)
GLUCOSE SERPL-MCNC: 90 MG/DL — SIGNIFICANT CHANGE UP (ref 70–99)
HCT VFR BLD CALC: 35.1 % — LOW (ref 39–50)
HGB BLD-MCNC: 11.4 G/DL — LOW (ref 13–17)
INR BLD: 1.03 — SIGNIFICANT CHANGE UP (ref 0.88–1.17)
MAGNESIUM SERPL-MCNC: 2 MG/DL — SIGNIFICANT CHANGE UP (ref 1.6–2.6)
MCHC RBC-ENTMCNC: 30.4 PG — SIGNIFICANT CHANGE UP (ref 27–34)
MCHC RBC-ENTMCNC: 32.5 % — SIGNIFICANT CHANGE UP (ref 32–36)
MCV RBC AUTO: 93.6 FL — SIGNIFICANT CHANGE UP (ref 80–100)
PHOSPHATE SERPL-MCNC: 3.1 MG/DL — SIGNIFICANT CHANGE UP (ref 2.5–4.5)
PLATELET # BLD AUTO: 341 K/UL — SIGNIFICANT CHANGE UP (ref 150–400)
PMV BLD: 10.1 FL — SIGNIFICANT CHANGE UP (ref 7–13)
POTASSIUM SERPL-MCNC: 3.6 MMOL/L — SIGNIFICANT CHANGE UP (ref 3.5–5.3)
POTASSIUM SERPL-SCNC: 3.6 MMOL/L — SIGNIFICANT CHANGE UP (ref 3.5–5.3)
PROT SERPL-MCNC: 6.8 G/DL — SIGNIFICANT CHANGE UP (ref 6–8.3)
PROTHROM AB SERPL-ACNC: 11.6 SEC — SIGNIFICANT CHANGE UP (ref 9.8–13.1)
RBC # BLD: 3.75 M/UL — LOW (ref 4.2–5.8)
RBC # FLD: 18.8 % — HIGH (ref 10.3–14.5)
RH IG SCN BLD-IMP: POSITIVE — SIGNIFICANT CHANGE UP
SODIUM SERPL-SCNC: 134 MMOL/L — LOW (ref 135–145)
WBC # BLD: 7.61 K/UL — SIGNIFICANT CHANGE UP (ref 3.8–10.5)
WBC # FLD AUTO: 7.61 K/UL — SIGNIFICANT CHANGE UP (ref 3.8–10.5)

## 2017-04-12 PROCEDURE — 99232 SBSQ HOSP IP/OBS MODERATE 35: CPT

## 2017-04-12 PROCEDURE — 93010 ELECTROCARDIOGRAM REPORT: CPT | Mod: 77

## 2017-04-12 PROCEDURE — 76937 US GUIDE VASCULAR ACCESS: CPT | Mod: 26

## 2017-04-12 PROCEDURE — 93010 ELECTROCARDIOGRAM REPORT: CPT

## 2017-04-12 PROCEDURE — 93451 RIGHT HEART CATH: CPT | Mod: 26

## 2017-04-12 RX ORDER — FUROSEMIDE 40 MG
40 TABLET ORAL DAILY
Qty: 0 | Refills: 0 | Status: DISCONTINUED | OUTPATIENT
Start: 2017-04-12 | End: 2017-04-13

## 2017-04-12 RX ORDER — POTASSIUM CHLORIDE 20 MEQ
40 PACKET (EA) ORAL ONCE
Qty: 0 | Refills: 0 | Status: COMPLETED | OUTPATIENT
Start: 2017-04-12 | End: 2017-04-12

## 2017-04-12 RX ORDER — CAPTOPRIL 12.5 MG/1
6.25 TABLET ORAL THREE TIMES A DAY
Qty: 0 | Refills: 0 | Status: DISCONTINUED | OUTPATIENT
Start: 2017-04-12 | End: 2017-04-13

## 2017-04-12 RX ADMIN — Medication 100 MILLIGRAM(S): at 06:18

## 2017-04-12 RX ADMIN — PANTOPRAZOLE SODIUM 40 MILLIGRAM(S): 20 TABLET, DELAYED RELEASE ORAL at 18:00

## 2017-04-12 RX ADMIN — Medication 40 MILLIEQUIVALENT(S): at 06:21

## 2017-04-12 RX ADMIN — CAPTOPRIL 6.25 MILLIGRAM(S): 12.5 TABLET ORAL at 22:34

## 2017-04-12 RX ADMIN — Medication 1 MILLIGRAM(S): at 12:53

## 2017-04-12 RX ADMIN — MEXILETINE HYDROCHLORIDE 150 MILLIGRAM(S): 150 CAPSULE ORAL at 06:18

## 2017-04-12 RX ADMIN — MEXILETINE HYDROCHLORIDE 150 MILLIGRAM(S): 150 CAPSULE ORAL at 22:34

## 2017-04-12 RX ADMIN — Medication 100 MILLIGRAM(S): at 22:34

## 2017-04-12 RX ADMIN — PANTOPRAZOLE SODIUM 40 MILLIGRAM(S): 20 TABLET, DELAYED RELEASE ORAL at 06:19

## 2017-04-12 RX ADMIN — SENNA PLUS 2 TABLET(S): 8.6 TABLET ORAL at 22:34

## 2017-04-12 RX ADMIN — Medication 100 MILLIGRAM(S): at 15:02

## 2017-04-12 RX ADMIN — CAPTOPRIL 6.25 MILLIGRAM(S): 12.5 TABLET ORAL at 15:02

## 2017-04-12 RX ADMIN — Medication 3 MILLIGRAM(S): at 22:34

## 2017-04-12 RX ADMIN — Medication 40 MILLIGRAM(S): at 12:53

## 2017-04-12 RX ADMIN — MEXILETINE HYDROCHLORIDE 150 MILLIGRAM(S): 150 CAPSULE ORAL at 15:02

## 2017-04-13 LAB
ALBUMIN SERPL ELPH-MCNC: 3.2 G/DL — LOW (ref 3.3–5)
ALP SERPL-CCNC: 109 U/L — SIGNIFICANT CHANGE UP (ref 40–120)
ALT FLD-CCNC: 83 U/L — HIGH (ref 4–41)
APTT BLD: 28.1 SEC — SIGNIFICANT CHANGE UP (ref 27.5–37.4)
AST SERPL-CCNC: 57 U/L — HIGH (ref 4–40)
BILIRUB SERPL-MCNC: 0.5 MG/DL — SIGNIFICANT CHANGE UP (ref 0.2–1.2)
BUN SERPL-MCNC: 19 MG/DL — SIGNIFICANT CHANGE UP (ref 7–23)
CALCIUM SERPL-MCNC: 8.9 MG/DL — SIGNIFICANT CHANGE UP (ref 8.4–10.5)
CHLORIDE SERPL-SCNC: 97 MMOL/L — LOW (ref 98–107)
CO2 SERPL-SCNC: 24 MMOL/L — SIGNIFICANT CHANGE UP (ref 22–31)
CREAT SERPL-MCNC: 1.09 MG/DL — SIGNIFICANT CHANGE UP (ref 0.5–1.3)
GLUCOSE SERPL-MCNC: 94 MG/DL — SIGNIFICANT CHANGE UP (ref 70–99)
HCT VFR BLD CALC: 34.2 % — LOW (ref 39–50)
HGB BLD-MCNC: 11.4 G/DL — LOW (ref 13–17)
INR BLD: 1.03 — SIGNIFICANT CHANGE UP (ref 0.88–1.17)
MAGNESIUM SERPL-MCNC: 2.1 MG/DL — SIGNIFICANT CHANGE UP (ref 1.6–2.6)
MCHC RBC-ENTMCNC: 31 PG — SIGNIFICANT CHANGE UP (ref 27–34)
MCHC RBC-ENTMCNC: 33.3 % — SIGNIFICANT CHANGE UP (ref 32–36)
MCV RBC AUTO: 92.9 FL — SIGNIFICANT CHANGE UP (ref 80–100)
PHOSPHATE SERPL-MCNC: 3 MG/DL — SIGNIFICANT CHANGE UP (ref 2.5–4.5)
PLATELET # BLD AUTO: 367 K/UL — SIGNIFICANT CHANGE UP (ref 150–400)
PMV BLD: 9.7 FL — SIGNIFICANT CHANGE UP (ref 7–13)
POTASSIUM SERPL-MCNC: 3.9 MMOL/L — SIGNIFICANT CHANGE UP (ref 3.5–5.3)
POTASSIUM SERPL-SCNC: 3.9 MMOL/L — SIGNIFICANT CHANGE UP (ref 3.5–5.3)
PROT SERPL-MCNC: 7 G/DL — SIGNIFICANT CHANGE UP (ref 6–8.3)
PROTHROM AB SERPL-ACNC: 11.5 SEC — SIGNIFICANT CHANGE UP (ref 9.8–13.1)
RBC # BLD: 3.68 M/UL — LOW (ref 4.2–5.8)
RBC # FLD: 18.5 % — HIGH (ref 10.3–14.5)
SODIUM SERPL-SCNC: 136 MMOL/L — SIGNIFICANT CHANGE UP (ref 135–145)
WBC # BLD: 8.06 K/UL — SIGNIFICANT CHANGE UP (ref 3.8–10.5)
WBC # FLD AUTO: 8.06 K/UL — SIGNIFICANT CHANGE UP (ref 3.8–10.5)

## 2017-04-13 PROCEDURE — 88312 SPECIAL STAINS GROUP 1: CPT | Mod: 26

## 2017-04-13 PROCEDURE — 93010 ELECTROCARDIOGRAM REPORT: CPT

## 2017-04-13 PROCEDURE — 88305 TISSUE EXAM BY PATHOLOGIST: CPT | Mod: 26

## 2017-04-13 PROCEDURE — 99233 SBSQ HOSP IP/OBS HIGH 50: CPT | Mod: GC

## 2017-04-13 PROCEDURE — 99232 SBSQ HOSP IP/OBS MODERATE 35: CPT

## 2017-04-13 PROCEDURE — 43239 EGD BIOPSY SINGLE/MULTIPLE: CPT | Mod: GC

## 2017-04-13 PROCEDURE — 93287 PERI-PX DEVICE EVAL & PRGR: CPT | Mod: 26

## 2017-04-13 PROCEDURE — 88342 IMHCHEM/IMCYTCHM 1ST ANTB: CPT | Mod: 26

## 2017-04-13 RX ORDER — PANTOPRAZOLE SODIUM 20 MG/1
40 TABLET, DELAYED RELEASE ORAL
Qty: 0 | Refills: 0 | Status: DISCONTINUED | OUTPATIENT
Start: 2017-04-13 | End: 2017-04-15

## 2017-04-13 RX ORDER — POTASSIUM CHLORIDE 20 MEQ
20 PACKET (EA) ORAL ONCE
Qty: 0 | Refills: 0 | Status: COMPLETED | OUTPATIENT
Start: 2017-04-13 | End: 2017-04-13

## 2017-04-13 RX ORDER — METOPROLOL TARTRATE 50 MG
12.5 TABLET ORAL DAILY
Qty: 0 | Refills: 0 | Status: DISCONTINUED | OUTPATIENT
Start: 2017-04-14 | End: 2017-04-15

## 2017-04-13 RX ORDER — RIVAROXABAN 15 MG-20MG
20 KIT ORAL DAILY
Qty: 0 | Refills: 0 | Status: DISCONTINUED | OUTPATIENT
Start: 2017-04-13 | End: 2017-04-15

## 2017-04-13 RX ADMIN — Medication 20 MILLIEQUIVALENT(S): at 16:10

## 2017-04-13 RX ADMIN — Medication 40 MILLIGRAM(S): at 06:21

## 2017-04-13 RX ADMIN — MEXILETINE HYDROCHLORIDE 150 MILLIGRAM(S): 150 CAPSULE ORAL at 06:21

## 2017-04-13 RX ADMIN — Medication 5 MILLIGRAM(S): at 23:25

## 2017-04-13 RX ADMIN — Medication 100 MILLIGRAM(S): at 16:05

## 2017-04-13 RX ADMIN — PANTOPRAZOLE SODIUM 40 MILLIGRAM(S): 20 TABLET, DELAYED RELEASE ORAL at 16:08

## 2017-04-13 RX ADMIN — RIVAROXABAN 20 MILLIGRAM(S): KIT at 23:57

## 2017-04-13 RX ADMIN — PANTOPRAZOLE SODIUM 40 MILLIGRAM(S): 20 TABLET, DELAYED RELEASE ORAL at 06:22

## 2017-04-13 RX ADMIN — MEXILETINE HYDROCHLORIDE 150 MILLIGRAM(S): 150 CAPSULE ORAL at 16:06

## 2017-04-13 RX ADMIN — CAPTOPRIL 6.25 MILLIGRAM(S): 12.5 TABLET ORAL at 16:06

## 2017-04-13 RX ADMIN — MEXILETINE HYDROCHLORIDE 150 MILLIGRAM(S): 150 CAPSULE ORAL at 23:20

## 2017-04-13 RX ADMIN — Medication 1 MILLIGRAM(S): at 16:06

## 2017-04-13 RX ADMIN — SENNA PLUS 2 TABLET(S): 8.6 TABLET ORAL at 23:23

## 2017-04-13 RX ADMIN — Medication 5 MILLIGRAM(S): at 00:30

## 2017-04-14 ENCOUNTER — APPOINTMENT (OUTPATIENT)
Dept: ELECTROPHYSIOLOGY | Facility: CLINIC | Age: 67
End: 2017-04-14

## 2017-04-14 LAB
BASOPHILS # BLD AUTO: 0.05 K/UL — SIGNIFICANT CHANGE UP (ref 0–0.2)
BASOPHILS NFR BLD AUTO: 0.8 % — SIGNIFICANT CHANGE UP (ref 0–2)
BUN SERPL-MCNC: 16 MG/DL — SIGNIFICANT CHANGE UP (ref 7–23)
CALCIUM SERPL-MCNC: 9.3 MG/DL — SIGNIFICANT CHANGE UP (ref 8.4–10.5)
CHLORIDE SERPL-SCNC: 98 MMOL/L — SIGNIFICANT CHANGE UP (ref 98–107)
CO2 SERPL-SCNC: 21 MMOL/L — LOW (ref 22–31)
CREAT SERPL-MCNC: 1.02 MG/DL — SIGNIFICANT CHANGE UP (ref 0.5–1.3)
EOSINOPHIL # BLD AUTO: 0.24 K/UL — SIGNIFICANT CHANGE UP (ref 0–0.5)
EOSINOPHIL NFR BLD AUTO: 3.6 % — SIGNIFICANT CHANGE UP (ref 0–6)
GLUCOSE SERPL-MCNC: 74 MG/DL — SIGNIFICANT CHANGE UP (ref 70–99)
HCT VFR BLD CALC: 36.9 % — LOW (ref 39–50)
HGB BLD-MCNC: 11.9 G/DL — LOW (ref 13–17)
IMM GRANULOCYTES NFR BLD AUTO: 1.1 % — SIGNIFICANT CHANGE UP (ref 0–1.5)
LYMPHOCYTES # BLD AUTO: 1.59 K/UL — SIGNIFICANT CHANGE UP (ref 1–3.3)
LYMPHOCYTES # BLD AUTO: 24.1 % — SIGNIFICANT CHANGE UP (ref 13–44)
MAGNESIUM SERPL-MCNC: 2.4 MG/DL — SIGNIFICANT CHANGE UP (ref 1.6–2.6)
MCHC RBC-ENTMCNC: 30.4 PG — SIGNIFICANT CHANGE UP (ref 27–34)
MCHC RBC-ENTMCNC: 32.2 % — SIGNIFICANT CHANGE UP (ref 32–36)
MCV RBC AUTO: 94.4 FL — SIGNIFICANT CHANGE UP (ref 80–100)
MONOCYTES # BLD AUTO: 0.5 K/UL — SIGNIFICANT CHANGE UP (ref 0–0.9)
MONOCYTES NFR BLD AUTO: 7.6 % — SIGNIFICANT CHANGE UP (ref 2–14)
NEUTROPHILS # BLD AUTO: 4.16 K/UL — SIGNIFICANT CHANGE UP (ref 1.8–7.4)
NEUTROPHILS NFR BLD AUTO: 62.8 % — SIGNIFICANT CHANGE UP (ref 43–77)
PHOSPHATE SERPL-MCNC: 3.7 MG/DL — SIGNIFICANT CHANGE UP (ref 2.5–4.5)
PLATELET # BLD AUTO: 258 K/UL — SIGNIFICANT CHANGE UP (ref 150–400)
PMV BLD: 10.6 FL — SIGNIFICANT CHANGE UP (ref 7–13)
POTASSIUM SERPL-MCNC: 4.9 MMOL/L — SIGNIFICANT CHANGE UP (ref 3.5–5.3)
POTASSIUM SERPL-SCNC: 4.9 MMOL/L — SIGNIFICANT CHANGE UP (ref 3.5–5.3)
RBC # BLD: 3.91 M/UL — LOW (ref 4.2–5.8)
RBC # FLD: 18.7 % — HIGH (ref 10.3–14.5)
SODIUM SERPL-SCNC: 137 MMOL/L — SIGNIFICANT CHANGE UP (ref 135–145)
WBC # BLD: 6.61 K/UL — SIGNIFICANT CHANGE UP (ref 3.8–10.5)
WBC # FLD AUTO: 6.61 K/UL — SIGNIFICANT CHANGE UP (ref 3.8–10.5)

## 2017-04-14 PROCEDURE — 74263 CT COLONOGRAPHY SCREENING: CPT | Mod: 26

## 2017-04-14 PROCEDURE — 99232 SBSQ HOSP IP/OBS MODERATE 35: CPT

## 2017-04-14 RX ORDER — SENNA PLUS 8.6 MG/1
2 TABLET ORAL
Qty: 0 | Refills: 0 | COMMUNITY
Start: 2017-04-14

## 2017-04-14 RX ORDER — SPIRONOLACTONE 25 MG/1
0.5 TABLET, FILM COATED ORAL
Qty: 0 | Refills: 0 | COMMUNITY

## 2017-04-14 RX ORDER — VALSARTAN 80 MG/1
3 TABLET ORAL
Qty: 0 | Refills: 0 | COMMUNITY

## 2017-04-14 RX ORDER — MEXILETINE HYDROCHLORIDE 150 MG/1
1 CAPSULE ORAL
Qty: 0 | Refills: 0 | COMMUNITY
Start: 2017-04-14

## 2017-04-14 RX ORDER — FUROSEMIDE 40 MG
1 TABLET ORAL
Qty: 0 | Refills: 0 | COMMUNITY

## 2017-04-14 RX ORDER — COLCHICINE 0.6 MG
1 TABLET ORAL
Qty: 0 | Refills: 0 | COMMUNITY

## 2017-04-14 RX ORDER — METOPROLOL TARTRATE 50 MG
1 TABLET ORAL
Qty: 0 | Refills: 0 | COMMUNITY

## 2017-04-14 RX ORDER — DOCUSATE SODIUM 100 MG
1 CAPSULE ORAL
Qty: 0 | Refills: 0 | COMMUNITY
Start: 2017-04-14

## 2017-04-14 RX ORDER — PANTOPRAZOLE SODIUM 20 MG/1
1 TABLET, DELAYED RELEASE ORAL
Qty: 80 | Refills: 0 | OUTPATIENT
Start: 2017-04-14 | End: 2017-05-24

## 2017-04-14 RX ORDER — METOPROLOL TARTRATE 50 MG
0.5 TABLET ORAL
Qty: 7.5 | Refills: 0 | OUTPATIENT
Start: 2017-04-14 | End: 2017-04-29

## 2017-04-14 RX ORDER — FOLIC ACID 0.8 MG
1 TABLET ORAL
Qty: 0 | Refills: 0 | COMMUNITY
Start: 2017-04-14

## 2017-04-14 RX ORDER — METOPROLOL TARTRATE 50 MG
0.5 TABLET ORAL
Qty: 15 | Refills: 0 | OUTPATIENT
Start: 2017-04-14 | End: 2017-05-14

## 2017-04-14 RX ORDER — RIVAROXABAN 15 MG-20MG
1 KIT ORAL
Qty: 0 | Refills: 0 | COMMUNITY
Start: 2017-04-14

## 2017-04-14 RX ORDER — PANTOPRAZOLE SODIUM 20 MG/1
1 TABLET, DELAYED RELEASE ORAL
Qty: 0 | Refills: 0 | COMMUNITY
Start: 2017-04-14

## 2017-04-14 RX ADMIN — MEXILETINE HYDROCHLORIDE 150 MILLIGRAM(S): 150 CAPSULE ORAL at 06:10

## 2017-04-14 RX ADMIN — Medication 12.5 MILLIGRAM(S): at 07:00

## 2017-04-14 RX ADMIN — Medication 1 MILLIGRAM(S): at 12:31

## 2017-04-14 RX ADMIN — PANTOPRAZOLE SODIUM 40 MILLIGRAM(S): 20 TABLET, DELAYED RELEASE ORAL at 07:00

## 2017-04-14 RX ADMIN — MEXILETINE HYDROCHLORIDE 150 MILLIGRAM(S): 150 CAPSULE ORAL at 22:22

## 2017-04-14 RX ADMIN — Medication 10 MILLIGRAM(S): at 06:10

## 2017-04-14 RX ADMIN — MEXILETINE HYDROCHLORIDE 150 MILLIGRAM(S): 150 CAPSULE ORAL at 14:31

## 2017-04-14 RX ADMIN — RIVAROXABAN 20 MILLIGRAM(S): KIT at 12:31

## 2017-04-14 RX ADMIN — Medication 3 MILLIGRAM(S): at 22:22

## 2017-04-14 RX ADMIN — PANTOPRAZOLE SODIUM 40 MILLIGRAM(S): 20 TABLET, DELAYED RELEASE ORAL at 16:40

## 2017-04-14 NOTE — PROVIDER CONTACT NOTE (OTHER) - ASSESSMENT
Pt is asymptomatic. Pt is asymptomatic. Patient currently resting comfortably in bed, Afib with PVC's on tele monitor.

## 2017-04-14 NOTE — PROVIDER CONTACT NOTE (OTHER) - NAME OF MD/NP/PA/DO NOTIFIED:
Jacques Fuentes NP
MD Elvis
OPAL Hanna
OPAL Hanna
OPAL Polanco
SHELLY gibbons
Tele SHELLY Hameed
SHELLY Alvarenga

## 2017-04-14 NOTE — PROVIDER CONTACT NOTE (OTHER) - REASON
4 Beats of Non Sustained V-Tach
BP 66/46
HR>110 and rhythm change noted
Systolic BP 82
blood pressure remain low, 60-70's systolic,
had black tarry stool, blood pressure remain low
refusing orthostatics
Torsades

## 2017-04-14 NOTE — PROVIDER CONTACT NOTE (OTHER) - BACKGROUND
Patient with history of CHF, AICD, nonischemic cardiomyopathy, afib, ablation, HTN, SVT, v-fib.  Admitted to CCU for IV diuresis
SR on tele previously
SR on tele previously
Patient admitted s/p AICD firing appropriately for arrhythmia;

## 2017-04-15 VITALS
OXYGEN SATURATION: 100 % | HEART RATE: 103 BPM | SYSTOLIC BLOOD PRESSURE: 90 MMHG | RESPIRATION RATE: 16 BRPM | TEMPERATURE: 98 F | DIASTOLIC BLOOD PRESSURE: 64 MMHG

## 2017-04-15 PROCEDURE — 99239 HOSP IP/OBS DSCHRG MGMT >30: CPT

## 2017-04-15 RX ADMIN — PANTOPRAZOLE SODIUM 40 MILLIGRAM(S): 20 TABLET, DELAYED RELEASE ORAL at 05:52

## 2017-04-15 RX ADMIN — RIVAROXABAN 20 MILLIGRAM(S): KIT at 11:29

## 2017-04-15 RX ADMIN — Medication 100 MILLIGRAM(S): at 05:52

## 2017-04-15 RX ADMIN — Medication 12.5 MILLIGRAM(S): at 05:52

## 2017-04-15 RX ADMIN — Medication 1 MILLIGRAM(S): at 11:29

## 2017-04-15 RX ADMIN — Medication 40 MILLIGRAM(S): at 05:52

## 2017-04-15 RX ADMIN — MEXILETINE HYDROCHLORIDE 150 MILLIGRAM(S): 150 CAPSULE ORAL at 05:52

## 2017-04-18 LAB — SURGICAL PATHOLOGY STUDY: SIGNIFICANT CHANGE UP

## 2017-04-28 ENCOUNTER — NON-APPOINTMENT (OUTPATIENT)
Age: 67
End: 2017-04-28

## 2017-04-28 ENCOUNTER — APPOINTMENT (OUTPATIENT)
Dept: ELECTROPHYSIOLOGY | Facility: CLINIC | Age: 67
End: 2017-04-28

## 2017-04-28 ENCOUNTER — INPATIENT (INPATIENT)
Facility: HOSPITAL | Age: 67
LOS: 6 days | Discharge: HOME | End: 2017-05-05
Attending: INTERNAL MEDICINE | Admitting: INTERNAL MEDICINE
Payer: MEDICARE

## 2017-04-28 VITALS
RESPIRATION RATE: 20 BRPM | HEART RATE: 95 BPM | TEMPERATURE: 98 F | OXYGEN SATURATION: 99 % | DIASTOLIC BLOOD PRESSURE: 75 MMHG | SYSTOLIC BLOOD PRESSURE: 98 MMHG

## 2017-04-28 VITALS
HEIGHT: 68 IN | DIASTOLIC BLOOD PRESSURE: 67 MMHG | BODY MASS INDEX: 21.67 KG/M2 | SYSTOLIC BLOOD PRESSURE: 92 MMHG | OXYGEN SATURATION: 96 % | HEART RATE: 99 BPM | WEIGHT: 143 LBS

## 2017-04-28 LAB
ALBUMIN SERPL ELPH-MCNC: 3.4 G/DL — SIGNIFICANT CHANGE UP (ref 3.3–5)
ALP SERPL-CCNC: 101 U/L — SIGNIFICANT CHANGE UP (ref 40–120)
ALT FLD-CCNC: 23 U/L — SIGNIFICANT CHANGE UP (ref 4–41)
APTT BLD: 34.7 SEC — SIGNIFICANT CHANGE UP (ref 27.5–37.4)
AST SERPL-CCNC: 47 U/L — HIGH (ref 4–40)
BASOPHILS # BLD AUTO: 0.03 K/UL — SIGNIFICANT CHANGE UP (ref 0–0.2)
BASOPHILS NFR BLD AUTO: 0.3 % — SIGNIFICANT CHANGE UP (ref 0–2)
BILIRUB SERPL-MCNC: 1.7 MG/DL — HIGH (ref 0.2–1.2)
BUN SERPL-MCNC: 29 MG/DL — HIGH (ref 7–23)
CALCIUM SERPL-MCNC: 9.4 MG/DL — SIGNIFICANT CHANGE UP (ref 8.4–10.5)
CHLORIDE SERPL-SCNC: 98 MMOL/L — SIGNIFICANT CHANGE UP (ref 98–107)
CK MB BLD-MCNC: 1.51 NG/ML — SIGNIFICANT CHANGE UP (ref 1–6.6)
CK MB BLD-MCNC: SIGNIFICANT CHANGE UP (ref 0–2.5)
CK SERPL-CCNC: 69 U/L — SIGNIFICANT CHANGE UP (ref 30–200)
CO2 SERPL-SCNC: 22 MMOL/L — SIGNIFICANT CHANGE UP (ref 22–31)
CREAT SERPL-MCNC: 1.62 MG/DL — HIGH (ref 0.5–1.3)
EOSINOPHIL # BLD AUTO: 0.14 K/UL — SIGNIFICANT CHANGE UP (ref 0–0.5)
EOSINOPHIL NFR BLD AUTO: 1.6 % — SIGNIFICANT CHANGE UP (ref 0–6)
GLUCOSE SERPL-MCNC: 73 MG/DL — SIGNIFICANT CHANGE UP (ref 70–99)
HCT VFR BLD CALC: 34.5 % — LOW (ref 39–50)
HGB BLD-MCNC: 10.7 G/DL — LOW (ref 13–17)
IMM GRANULOCYTES NFR BLD AUTO: 0.3 % — SIGNIFICANT CHANGE UP (ref 0–1.5)
INR BLD: 3.61 — HIGH (ref 0.88–1.17)
LIDOCAIN IGE QN: 28.1 U/L — SIGNIFICANT CHANGE UP (ref 7–60)
LYMPHOCYTES # BLD AUTO: 2.43 K/UL — SIGNIFICANT CHANGE UP (ref 1–3.3)
LYMPHOCYTES # BLD AUTO: 27.7 % — SIGNIFICANT CHANGE UP (ref 13–44)
MCHC RBC-ENTMCNC: 29.2 PG — SIGNIFICANT CHANGE UP (ref 27–34)
MCHC RBC-ENTMCNC: 31 % — LOW (ref 32–36)
MCV RBC AUTO: 94.3 FL — SIGNIFICANT CHANGE UP (ref 80–100)
MONOCYTES # BLD AUTO: 0.97 K/UL — HIGH (ref 0–0.9)
MONOCYTES NFR BLD AUTO: 11.1 % — SIGNIFICANT CHANGE UP (ref 2–14)
NEUTROPHILS # BLD AUTO: 5.16 K/UL — SIGNIFICANT CHANGE UP (ref 1.8–7.4)
NEUTROPHILS NFR BLD AUTO: 59 % — SIGNIFICANT CHANGE UP (ref 43–77)
PLATELET # BLD AUTO: 343 K/UL — SIGNIFICANT CHANGE UP (ref 150–400)
PMV BLD: 10.9 FL — SIGNIFICANT CHANGE UP (ref 7–13)
POTASSIUM SERPL-MCNC: 5.7 MMOL/L — HIGH (ref 3.5–5.3)
POTASSIUM SERPL-SCNC: 5.7 MMOL/L — HIGH (ref 3.5–5.3)
PROT SERPL-MCNC: 7.8 G/DL — SIGNIFICANT CHANGE UP (ref 6–8.3)
PROTHROM AB SERPL-ACNC: 41.5 SEC — HIGH (ref 9.8–13.1)
RBC # BLD: 3.66 M/UL — LOW (ref 4.2–5.8)
RBC # FLD: 18.6 % — HIGH (ref 10.3–14.5)
SODIUM SERPL-SCNC: 139 MMOL/L — SIGNIFICANT CHANGE UP (ref 135–145)
TROPONIN T SERPL-MCNC: < 0.06 NG/ML — SIGNIFICANT CHANGE UP (ref 0–0.06)
WBC # BLD: 8.76 K/UL — SIGNIFICANT CHANGE UP (ref 3.8–10.5)
WBC # FLD AUTO: 8.76 K/UL — SIGNIFICANT CHANGE UP (ref 3.8–10.5)

## 2017-04-28 RX ORDER — ONDANSETRON 8 MG/1
4 TABLET, FILM COATED ORAL ONCE
Qty: 0 | Refills: 0 | Status: COMPLETED | OUTPATIENT
Start: 2017-04-28 | End: 2017-04-28

## 2017-04-28 RX ORDER — VALSARTAN 80 MG/1
80 TABLET, COATED ORAL
Qty: 60 | Refills: 0 | Status: DISCONTINUED | COMMUNITY
Start: 2017-02-07

## 2017-04-28 RX ORDER — CARVEDILOL 25 MG/1
25 TABLET, FILM COATED ORAL
Qty: 60 | Refills: 0 | Status: DISCONTINUED | COMMUNITY
Start: 2017-01-04

## 2017-04-28 RX ORDER — SPIRONOLACTONE 25 MG/1
25 TABLET ORAL
Qty: 30 | Refills: 3 | Status: DISCONTINUED | COMMUNITY
Start: 2017-03-24 | End: 2017-04-28

## 2017-04-28 RX ORDER — SODIUM CHLORIDE 9 MG/ML
250 INJECTION INTRAMUSCULAR; INTRAVENOUS; SUBCUTANEOUS EVERY 4 HOURS
Qty: 0 | Refills: 0 | Status: COMPLETED | OUTPATIENT
Start: 2017-04-28 | End: 2017-04-29

## 2017-04-28 RX ORDER — VALSARTAN 40 MG/1
40 TABLET, COATED ORAL TWICE DAILY
Qty: 60 | Refills: 0 | Status: DISCONTINUED | COMMUNITY
Start: 2017-02-07 | End: 2017-04-28

## 2017-04-28 RX ORDER — ENALAPRIL MALEATE 10 MG/1
10 TABLET ORAL
Qty: 30 | Refills: 0 | Status: DISCONTINUED | COMMUNITY
Start: 2016-10-20

## 2017-04-28 RX ORDER — FUROSEMIDE 80 MG/1
80 TABLET ORAL
Qty: 30 | Refills: 0 | Status: DISCONTINUED | COMMUNITY
Start: 2017-01-30

## 2017-04-28 RX ORDER — COLCHICINE 0.6 MG/1
0.6 TABLET, FILM COATED ORAL
Qty: 30 | Refills: 0 | Status: DISCONTINUED | COMMUNITY
Start: 2017-03-01

## 2017-04-28 RX ORDER — SODIUM CHLORIDE 9 MG/ML
500 INJECTION INTRAMUSCULAR; INTRAVENOUS; SUBCUTANEOUS ONCE
Qty: 0 | Refills: 0 | Status: COMPLETED | OUTPATIENT
Start: 2017-04-28 | End: 2017-04-28

## 2017-04-28 RX ADMIN — ONDANSETRON 4 MILLIGRAM(S): 8 TABLET, FILM COATED ORAL at 20:29

## 2017-04-28 RX ADMIN — SODIUM CHLORIDE 2000 MILLILITER(S): 9 INJECTION INTRAMUSCULAR; INTRAVENOUS; SUBCUTANEOUS at 21:50

## 2017-04-28 NOTE — ED PROVIDER NOTE - PROGRESS NOTE DETAILS
will give zofran and PO challenge him. s/p zofran no longer nauseous. tolerated ginger ale and bread without N/V Cr. 1.62 and BUN 29, dry mucus membranes and a h/o decrease PO intake and N/V. Most likely prerenal. Will give 500cc bolus NS. Given bump in BUN and Cr. Will check UA. Will monitor in CDU overnight and repeat BMP in the AM.

## 2017-04-28 NOTE — ED ADULT NURSE NOTE - OBJECTIVE STATEMENT
67 year old male, A&Ox3, hx of CHF, HTN, PAF/SVT with AICD in place, recent revision 2 weeks ago c/o lightheadedness and SOB x 4 days. Admits to dizziness and fall today, denies hitting head, LOC or pain, admits to nausea, vomiting and inability to tolerate PO intake today as well. Denies chest pain, fever, chills, diarrhea, blood in stool or urine, dysuria or abdominal pain. Last BM was 2 days ago, respirations even, unlabored. sinus with intermittent v-tach on CM. Abdomen soft, nondistended, nontender. MD at bedside for evaluation.

## 2017-04-28 NOTE — ED PROVIDER NOTE - CARDIOVASCULAR NEGATIVE STATEMENT, MLM
normal rate and rhythm, no chest pain and no edema.  Hx of  Atrial fibrillation, ischemic cardiomyopathy, CHF, SVT and cardiac ablation

## 2017-04-28 NOTE — ED ADULT NURSE NOTE - NS ED NURSE NOTE DISPO AOU DETAILS FT
pt admitted to CDU. pt a&o x3, endorsed to kobi francois. bolus infusing. pt tolerating PO. no c.o pain. nad noted. safety maintained

## 2017-04-28 NOTE — ED PROVIDER NOTE - ATTENDING CONTRIBUTION TO CARE
AMMY Attending Note - Dr. Aguayo 68YO M PMHx of CHF s/p AICD approx 4 years ago, afib on xarelto, nonischemic cardiomyopathy presents with N/V s/p fall in the AM sent from cardiologist office. Patient states been having lightheadedness for the past few days with Nausea and vomiting after eating a turkey burger    PE: pt is alert and oriented, perrl, ent normal, membranes are dry, neck supple. no lymphadenopathy or thyroid enlargement, No JVD.  Chest clear to P&A, Heart- reg rhythm with multiple premature beats.  No murmur, rubs or gallops, radial pulses equal bilaterally.  Abd is soft, non-tender, Bowel sounds are active. no mass or organomegaly. : No CVA tenderness. Neuro:  Pt alert and oriented x 3. Perrl    Distal neurosensory is intact. Motor function is 5/5 strength bilaterally.  No focal deficits. Extremities:  No edema.  Skin: warm and dry.  Imp dehydration with elevated BUN/Creatine ratio and unable to hold down his Metoprolol 25mg ER and his Mexilitine.  Plan CDU

## 2017-04-28 NOTE — ED ADULT NURSE NOTE - PSH
AICD (Automatic Cardioverter/Defibrillator) Present  [employer] with 1 [employer] lead4/1/09- explanted and replaced with [employer] 2 leads on 9/2/09  History of Prior Ablation Treatment    Status post left hip replacement

## 2017-04-28 NOTE — ED PROVIDER NOTE - PSH
AICD (Automatic Cardioverter/Defibrillator) Present  [employer] with 1 [employer] lead4/1/09- explanted and replaced with [employer]EL [employer] 2 leads on 9/2/09  History of Prior Ablation Treatment    Status post left hip replacement

## 2017-04-28 NOTE — ED PROVIDER NOTE - OBJECTIVE STATEMENT
66YO M PMHx of CHF s/p AICD approx 4 years ago, afib on xarelto, nonischemic cardiomyopathy presents with N/V s/p fall in the AM sent from cardiologist office. Patient states been having lightheadedness for the past few days and otherwise developed N/V last night after eating a turkey burger at an elder center. 5 episodes of NBNB vomiting with last episode last night. No sick contact. No fevers, chills, abdom pain, CP, SOB, diarrhea (last BM was 2 days prior and was NL). This morning patient had 2 bites of food and was nauseated and thus didn't eat anymore. Was rising from his seat when he fell backward striking his buttock and his b/l elbows. Denies any LOC, striking his head, CP, SOB at the time. States he felt light headed and thus fell. Went to his cardiologist to get his AICD interrogated which patient states showed no events. Was sent from cardiologist for work up.

## 2017-04-28 NOTE — ED ADULT TRIAGE NOTE - CHIEF COMPLAINT QUOTE
pt sent from cardiology pt had his AICD interrogated today sent to ER for nausea vomiting since last night FS [id number]

## 2017-04-28 NOTE — ED PROVIDER NOTE - MEDICAL DECISION MAKING DETAILS
Patient comes in with N/V. Most likely gastroenteritis. BUN and Cr elevated. Gave 500cc NS bolus given h/o CHF. Patient given zofran and tolerated PO. Patient comes in with N/V. Most likely gastroenteritis. BUN and Cr elevated. Gave 500cc NS bolus given h/o CHF. Patient given zofran and tolerated PO. Will monitor patient in the CDU with repeat BMP in the AM

## 2017-04-29 DIAGNOSIS — I50.9 HEART FAILURE, UNSPECIFIED: ICD-10-CM

## 2017-04-29 DIAGNOSIS — I10 ESSENTIAL (PRIMARY) HYPERTENSION: ICD-10-CM

## 2017-04-29 DIAGNOSIS — E86.0 DEHYDRATION: ICD-10-CM

## 2017-04-29 DIAGNOSIS — I48.0 PAROXYSMAL ATRIAL FIBRILLATION: ICD-10-CM

## 2017-04-29 LAB
ALBUMIN SERPL ELPH-MCNC: 3 G/DL — LOW (ref 3.3–5)
ALBUMIN SERPL ELPH-MCNC: 3.3 G/DL — SIGNIFICANT CHANGE UP (ref 3.3–5)
ALP SERPL-CCNC: 100 U/L — SIGNIFICANT CHANGE UP (ref 40–120)
ALP SERPL-CCNC: 90 U/L — SIGNIFICANT CHANGE UP (ref 40–120)
ALT FLD-CCNC: 19 U/L — SIGNIFICANT CHANGE UP (ref 4–41)
ALT FLD-CCNC: 20 U/L — SIGNIFICANT CHANGE UP (ref 4–41)
APPEARANCE UR: CLEAR — SIGNIFICANT CHANGE UP
AST SERPL-CCNC: 22 U/L — SIGNIFICANT CHANGE UP (ref 4–40)
AST SERPL-CCNC: 31 U/L — SIGNIFICANT CHANGE UP (ref 4–40)
BASE EXCESS BLDV CALC-SCNC: 0.3 MMOL/L — SIGNIFICANT CHANGE UP
BASE EXCESS BLDV CALC-SCNC: 0.7 MMOL/L — SIGNIFICANT CHANGE UP
BASOPHILS # BLD AUTO: 0.03 K/UL — SIGNIFICANT CHANGE UP (ref 0–0.2)
BASOPHILS NFR BLD AUTO: 0.3 % — SIGNIFICANT CHANGE UP (ref 0–2)
BILIRUB SERPL-MCNC: 1.5 MG/DL — HIGH (ref 0.2–1.2)
BILIRUB SERPL-MCNC: 1.5 MG/DL — HIGH (ref 0.2–1.2)
BILIRUB UR-MCNC: NEGATIVE — SIGNIFICANT CHANGE UP
BLOOD GAS VENOUS - CREATININE: 1.47 MG/DL — HIGH (ref 0.5–1.3)
BLOOD GAS VENOUS - CREATININE: 1.53 MG/DL — HIGH (ref 0.5–1.3)
BLOOD UR QL VISUAL: NEGATIVE — SIGNIFICANT CHANGE UP
BUN SERPL-MCNC: 28 MG/DL — HIGH (ref 7–23)
BUN SERPL-MCNC: 29 MG/DL — HIGH (ref 7–23)
CALCIUM SERPL-MCNC: 8.6 MG/DL — SIGNIFICANT CHANGE UP (ref 8.4–10.5)
CALCIUM SERPL-MCNC: 9.1 MG/DL — SIGNIFICANT CHANGE UP (ref 8.4–10.5)
CHLORIDE BLDV-SCNC: 103 MMOL/L — SIGNIFICANT CHANGE UP (ref 96–108)
CHLORIDE BLDV-SCNC: 104 MMOL/L — SIGNIFICANT CHANGE UP (ref 96–108)
CHLORIDE SERPL-SCNC: 100 MMOL/L — SIGNIFICANT CHANGE UP (ref 98–107)
CHLORIDE SERPL-SCNC: 99 MMOL/L — SIGNIFICANT CHANGE UP (ref 98–107)
CK MB BLD-MCNC: 1.64 NG/ML — SIGNIFICANT CHANGE UP (ref 1–6.6)
CK MB BLD-MCNC: SIGNIFICANT CHANGE UP (ref 0–2.5)
CK SERPL-CCNC: 61 U/L — SIGNIFICANT CHANGE UP (ref 30–200)
CO2 SERPL-SCNC: 18 MMOL/L — LOW (ref 22–31)
CO2 SERPL-SCNC: 21 MMOL/L — LOW (ref 22–31)
COLOR SPEC: YELLOW — SIGNIFICANT CHANGE UP
CREAT SERPL-MCNC: 1.48 MG/DL — HIGH (ref 0.5–1.3)
CREAT SERPL-MCNC: 1.48 MG/DL — HIGH (ref 0.5–1.3)
EOSINOPHIL # BLD AUTO: 0.23 K/UL — SIGNIFICANT CHANGE UP (ref 0–0.5)
EOSINOPHIL NFR BLD AUTO: 2.5 % — SIGNIFICANT CHANGE UP (ref 0–6)
GAS PNL BLDV: 136 MMOL/L — SIGNIFICANT CHANGE UP (ref 136–146)
GAS PNL BLDV: 137 MMOL/L — SIGNIFICANT CHANGE UP (ref 136–146)
GLUCOSE BLDV-MCNC: 74 — SIGNIFICANT CHANGE UP (ref 70–99)
GLUCOSE BLDV-MCNC: 99 — SIGNIFICANT CHANGE UP (ref 70–99)
GLUCOSE SERPL-MCNC: 76 MG/DL — SIGNIFICANT CHANGE UP (ref 70–99)
GLUCOSE SERPL-MCNC: 78 MG/DL — SIGNIFICANT CHANGE UP (ref 70–99)
GLUCOSE UR-MCNC: NEGATIVE — SIGNIFICANT CHANGE UP
HBA1C BLD-MCNC: 5.2 % — SIGNIFICANT CHANGE UP (ref 4–5.6)
HCO3 BLDV-SCNC: 24 MMOL/L — SIGNIFICANT CHANGE UP (ref 20–27)
HCO3 BLDV-SCNC: 24 MMOL/L — SIGNIFICANT CHANGE UP (ref 20–27)
HCT VFR BLD CALC: 29.4 % — LOW (ref 39–50)
HCT VFR BLDV CALC: 26.9 % — LOW (ref 39–51)
HCT VFR BLDV CALC: 30.7 % — LOW (ref 39–51)
HGB BLD-MCNC: 9.4 G/DL — LOW (ref 13–17)
HGB BLDV-MCNC: 8.7 G/DL — LOW (ref 13–17)
HGB BLDV-MCNC: 9.9 G/DL — LOW (ref 13–17)
IMM GRANULOCYTES NFR BLD AUTO: 0.7 % — SIGNIFICANT CHANGE UP (ref 0–1.5)
KETONES UR-MCNC: NEGATIVE — SIGNIFICANT CHANGE UP
LACTATE BLDV-MCNC: 2.7 MMOL/L — HIGH (ref 0.5–2)
LACTATE BLDV-MCNC: 2.9 MMOL/L — HIGH (ref 0.5–2)
LEUKOCYTE ESTERASE UR-ACNC: NEGATIVE — SIGNIFICANT CHANGE UP
LYMPHOCYTES # BLD AUTO: 2.62 K/UL — SIGNIFICANT CHANGE UP (ref 1–3.3)
LYMPHOCYTES # BLD AUTO: 28.6 % — SIGNIFICANT CHANGE UP (ref 13–44)
MAGNESIUM SERPL-MCNC: 1.8 MG/DL — SIGNIFICANT CHANGE UP (ref 1.6–2.6)
MAGNESIUM SERPL-MCNC: 2.4 MG/DL — SIGNIFICANT CHANGE UP (ref 1.6–2.6)
MCHC RBC-ENTMCNC: 29.6 PG — SIGNIFICANT CHANGE UP (ref 27–34)
MCHC RBC-ENTMCNC: 32 % — SIGNIFICANT CHANGE UP (ref 32–36)
MCV RBC AUTO: 92.5 FL — SIGNIFICANT CHANGE UP (ref 80–100)
MONOCYTES # BLD AUTO: 1.55 K/UL — HIGH (ref 0–0.9)
MONOCYTES NFR BLD AUTO: 16.9 % — HIGH (ref 2–14)
MUCOUS THREADS # UR AUTO: SIGNIFICANT CHANGE UP
NEUTROPHILS # BLD AUTO: 4.66 K/UL — SIGNIFICANT CHANGE UP (ref 1.8–7.4)
NEUTROPHILS NFR BLD AUTO: 51 % — SIGNIFICANT CHANGE UP (ref 43–77)
NITRITE UR-MCNC: NEGATIVE — SIGNIFICANT CHANGE UP
NT-PROBNP SERPL-SCNC: 8319 PG/ML — SIGNIFICANT CHANGE UP
PCO2 BLDV: 43 MMHG — SIGNIFICANT CHANGE UP (ref 41–51)
PCO2 BLDV: 44 MMHG — SIGNIFICANT CHANGE UP (ref 41–51)
PH BLDV: 7.37 PH — SIGNIFICANT CHANGE UP (ref 7.32–7.43)
PH BLDV: 7.38 PH — SIGNIFICANT CHANGE UP (ref 7.32–7.43)
PH UR: 6 — SIGNIFICANT CHANGE UP (ref 4.6–8)
PLATELET # BLD AUTO: 337 K/UL — SIGNIFICANT CHANGE UP (ref 150–400)
PMV BLD: 11.2 FL — SIGNIFICANT CHANGE UP (ref 7–13)
PO2 BLDV: 25 MMHG — LOW (ref 35–40)
PO2 BLDV: < 24 MMHG — LOW (ref 35–40)
POTASSIUM BLDV-SCNC: 3.3 MMOL/L — LOW (ref 3.4–4.5)
POTASSIUM BLDV-SCNC: 3.6 MMOL/L — SIGNIFICANT CHANGE UP (ref 3.4–4.5)
POTASSIUM SERPL-MCNC: 3.6 MMOL/L — SIGNIFICANT CHANGE UP (ref 3.5–5.3)
POTASSIUM SERPL-MCNC: 4.4 MMOL/L — SIGNIFICANT CHANGE UP (ref 3.5–5.3)
POTASSIUM SERPL-SCNC: 3.6 MMOL/L — SIGNIFICANT CHANGE UP (ref 3.5–5.3)
POTASSIUM SERPL-SCNC: 4.4 MMOL/L — SIGNIFICANT CHANGE UP (ref 3.5–5.3)
PROT SERPL-MCNC: 6.6 G/DL — SIGNIFICANT CHANGE UP (ref 6–8.3)
PROT SERPL-MCNC: 7.1 G/DL — SIGNIFICANT CHANGE UP (ref 6–8.3)
PROT UR-MCNC: 10 — SIGNIFICANT CHANGE UP
RBC # BLD: 3.18 M/UL — LOW (ref 4.2–5.8)
RBC # FLD: 18.4 % — HIGH (ref 10.3–14.5)
RBC CASTS # UR COMP ASSIST: SIGNIFICANT CHANGE UP (ref 0–?)
SAO2 % BLDV: 23.4 % — LOW (ref 60–85)
SAO2 % BLDV: 29.6 % — LOW (ref 60–85)
SODIUM SERPL-SCNC: 137 MMOL/L — SIGNIFICANT CHANGE UP (ref 135–145)
SODIUM SERPL-SCNC: 139 MMOL/L — SIGNIFICANT CHANGE UP (ref 135–145)
SP GR SPEC: 1.01 — SIGNIFICANT CHANGE UP (ref 1–1.03)
SQUAMOUS # UR AUTO: SIGNIFICANT CHANGE UP
TROPONIN T SERPL-MCNC: < 0.06 NG/ML — SIGNIFICANT CHANGE UP (ref 0–0.06)
UROBILINOGEN FLD QL: 2 E.U. — SIGNIFICANT CHANGE UP (ref 0.1–0.2)
WBC # BLD: 9.15 K/UL — SIGNIFICANT CHANGE UP (ref 3.8–10.5)
WBC # FLD AUTO: 9.15 K/UL — SIGNIFICANT CHANGE UP (ref 3.8–10.5)
WBC UR QL: SIGNIFICANT CHANGE UP (ref 0–?)

## 2017-04-29 PROCEDURE — 70450 CT HEAD/BRAIN W/O DYE: CPT | Mod: 26

## 2017-04-29 PROCEDURE — 71020: CPT | Mod: 26

## 2017-04-29 PROCEDURE — 93306 TTE W/DOPPLER COMPLETE: CPT | Mod: 26

## 2017-04-29 RX ORDER — POTASSIUM CHLORIDE 20 MEQ
40 PACKET (EA) ORAL ONCE
Qty: 0 | Refills: 0 | Status: COMPLETED | OUTPATIENT
Start: 2017-04-29 | End: 2017-04-29

## 2017-04-29 RX ORDER — SENNA PLUS 8.6 MG/1
2 TABLET ORAL AT BEDTIME
Qty: 0 | Refills: 0 | Status: DISCONTINUED | OUTPATIENT
Start: 2017-04-29 | End: 2017-05-05

## 2017-04-29 RX ORDER — PANTOPRAZOLE SODIUM 20 MG/1
40 TABLET, DELAYED RELEASE ORAL
Qty: 0 | Refills: 0 | Status: DISCONTINUED | OUTPATIENT
Start: 2017-04-29 | End: 2017-05-05

## 2017-04-29 RX ORDER — MAGNESIUM SULFATE 500 MG/ML
2 VIAL (ML) INJECTION ONCE
Qty: 0 | Refills: 0 | Status: COMPLETED | OUTPATIENT
Start: 2017-04-29 | End: 2017-04-29

## 2017-04-29 RX ORDER — METOPROLOL TARTRATE 50 MG
12.5 TABLET ORAL DAILY
Qty: 0 | Refills: 0 | Status: DISCONTINUED | OUTPATIENT
Start: 2017-04-29 | End: 2017-05-01

## 2017-04-29 RX ORDER — METOPROLOL TARTRATE 50 MG
25 TABLET ORAL DAILY
Qty: 0 | Refills: 0 | Status: DISCONTINUED | OUTPATIENT
Start: 2017-04-29 | End: 2017-04-29

## 2017-04-29 RX ORDER — MEXILETINE HYDROCHLORIDE 150 MG/1
150 CAPSULE ORAL THREE TIMES A DAY
Qty: 0 | Refills: 0 | Status: DISCONTINUED | OUTPATIENT
Start: 2017-04-29 | End: 2017-05-05

## 2017-04-29 RX ORDER — FOLIC ACID 0.8 MG
1 TABLET ORAL DAILY
Qty: 0 | Refills: 0 | Status: DISCONTINUED | OUTPATIENT
Start: 2017-04-29 | End: 2017-05-05

## 2017-04-29 RX ORDER — ACETAMINOPHEN 500 MG
650 TABLET ORAL EVERY 6 HOURS
Qty: 0 | Refills: 0 | Status: DISCONTINUED | OUTPATIENT
Start: 2017-04-29 | End: 2017-05-05

## 2017-04-29 RX ORDER — SODIUM CHLORIDE 9 MG/ML
250 INJECTION INTRAMUSCULAR; INTRAVENOUS; SUBCUTANEOUS ONCE
Qty: 0 | Refills: 0 | Status: COMPLETED | OUTPATIENT
Start: 2017-04-29 | End: 2017-04-29

## 2017-04-29 RX ORDER — ASCORBIC ACID 60 MG
500 TABLET,CHEWABLE ORAL DAILY
Qty: 0 | Refills: 0 | Status: DISCONTINUED | OUTPATIENT
Start: 2017-04-29 | End: 2017-05-05

## 2017-04-29 RX ORDER — RIVAROXABAN 15 MG-20MG
20 KIT ORAL DAILY
Qty: 0 | Refills: 0 | Status: DISCONTINUED | OUTPATIENT
Start: 2017-04-29 | End: 2017-05-05

## 2017-04-29 RX ORDER — DOCUSATE SODIUM 100 MG
100 CAPSULE ORAL THREE TIMES A DAY
Qty: 0 | Refills: 0 | Status: DISCONTINUED | OUTPATIENT
Start: 2017-04-29 | End: 2017-05-05

## 2017-04-29 RX ORDER — FAMOTIDINE 10 MG/ML
20 INJECTION INTRAVENOUS ONCE
Qty: 0 | Refills: 0 | Status: COMPLETED | OUTPATIENT
Start: 2017-04-29 | End: 2017-04-29

## 2017-04-29 RX ORDER — PANTOPRAZOLE SODIUM 20 MG/1
40 TABLET, DELAYED RELEASE ORAL
Qty: 0 | Refills: 0 | Status: DISCONTINUED | OUTPATIENT
Start: 2017-04-29 | End: 2017-04-29

## 2017-04-29 RX ADMIN — MEXILETINE HYDROCHLORIDE 150 MILLIGRAM(S): 150 CAPSULE ORAL at 05:57

## 2017-04-29 RX ADMIN — Medication 500 MILLIGRAM(S): at 16:04

## 2017-04-29 RX ADMIN — Medication 1 MILLIGRAM(S): at 16:05

## 2017-04-29 RX ADMIN — MEXILETINE HYDROCHLORIDE 150 MILLIGRAM(S): 150 CAPSULE ORAL at 14:03

## 2017-04-29 RX ADMIN — Medication 10 MILLIGRAM(S): at 09:36

## 2017-04-29 RX ADMIN — MEXILETINE HYDROCHLORIDE 150 MILLIGRAM(S): 150 CAPSULE ORAL at 22:23

## 2017-04-29 RX ADMIN — PANTOPRAZOLE SODIUM 40 MILLIGRAM(S): 20 TABLET, DELAYED RELEASE ORAL at 07:46

## 2017-04-29 RX ADMIN — RIVAROXABAN 20 MILLIGRAM(S): KIT at 09:36

## 2017-04-29 RX ADMIN — Medication 1 TABLET(S): at 16:06

## 2017-04-29 RX ADMIN — Medication 1 TABLET(S): at 16:04

## 2017-04-29 RX ADMIN — SODIUM CHLORIDE 250 MILLILITER(S): 9 INJECTION INTRAMUSCULAR; INTRAVENOUS; SUBCUTANEOUS at 05:42

## 2017-04-29 RX ADMIN — Medication 50 GRAM(S): at 03:09

## 2017-04-29 RX ADMIN — Medication 100 MILLIGRAM(S): at 16:04

## 2017-04-29 RX ADMIN — SODIUM CHLORIDE 250 MILLILITER(S): 9 INJECTION INTRAMUSCULAR; INTRAVENOUS; SUBCUTANEOUS at 01:15

## 2017-04-29 RX ADMIN — PANTOPRAZOLE SODIUM 40 MILLIGRAM(S): 20 TABLET, DELAYED RELEASE ORAL at 18:19

## 2017-04-29 RX ADMIN — Medication 100 MILLIGRAM(S): at 22:23

## 2017-04-29 RX ADMIN — FAMOTIDINE 20 MILLIGRAM(S): 10 INJECTION INTRAVENOUS at 09:57

## 2017-04-29 RX ADMIN — Medication 40 MILLIEQUIVALENT(S): at 03:09

## 2017-04-29 RX ADMIN — Medication 12.5 MILLIGRAM(S): at 05:56

## 2017-04-29 NOTE — ED CDU PROVIDER NOTE - PROGRESS NOTE DETAILS
randi cardio and dr anders regarding patient once arrived to cdu as monitor showing multiple pvc in about 1st half hour on monitor (couplet, bigem, trigem reading). Pt feeling fine, asx.  cardio had requested a repeat cmp for the K+ as well as mag which came back ok.  Will see pt in CDU tonight.  Pt resting comfortably in NAD.  DW Dr anders regarding CT head sp fall(pt on blood thinners) and ok with plan to add CT head.  In addition cardio requesting a cxr, vbg, and bnp based on office visit note from dr torres this afternoon which they were able to obtain.  Will continue to observe. randi cardio and dr anders (who came to re-eval pt) regarding patient once arrived to cdu as monitor showing multiple pvc in about 1st half hour on monitor (couplet, bigem, trigem reading). Pt feeling fine, asx.  cardio had requested a repeat cmp for the K+ as well as mag which came back ok.  Will see pt in CDU tonight.  Pt resting comfortably in NAD.  DW Dr anders regarding CT head sp fall(pt on blood thinners) and ok with plan to add CT head.  In addition cardio requesting a cxr, vbg, and bnp based on office visit note from dr torres this afternoon which they were able to obtain.  Will continue to observe. CDU attending:  MR Baez was sleeping in CDU.  Monitor showed a NSR.  Mr Baez stated that he was feeling better.  He was A&O x 3 without focal deficit.  Creatinine had decreased from 1.6 to 1.4 with gentle hydration. Resting comfortably, NAD.  CT no actue hemorrhage, cxr prelim ok. Will cont fluids, monitor labs. Cardio consult pending. Will continue to observe. Resting comfortably, NAD.  HR maintained low 90s on tele.  PVC less frequent.  CT no actue hemorrhage, cxr prelim ok. Will cont fluids, monitor labs. Cardio consult pending. Will continue to observe. Seen by cardio, EKG's / Pvc's all typical for patient . Asx, without complaints. Requesting bring K to 4 and Mg to 2 by giving 40 po K+ and 2 IV Mg with recheck in morning with rpt VBG.  2nd set CE pending and bnp pending, will continue to observe and cardio/EP  will see pt in AM Resting comfortably, NAD.  HR maintained low 90s on tele.  PVC less frequent.  CT no actue hemorrhage, cxr prelim ok. Will cont fluids, monitor labs. Tolerating po without recurrent n/v.   Cardio consult pending. Will continue to observe. randi cardio regarding inc BNP from 4/7 to now [phone]. Lungs clear, no sg heart failure , pt only hada 750 cc fluid, ok to give next 250 when due and cardio will be by to see him in the AM. Pt restied comfortably through the night. NAD. VSS.  Lungs clear, no sg heart failure.  HR remained in low 90s through the night.  CE times 2 ng.  Repeat labs planned for the morning as per cardio, as well as cardio re-eval. Pt signed out to day team PA and ATT Pt restied comfortably through the night. NAD. VSS.  Lungs clear, no sg heart failure.  HR remained in low 90s through the night.  CE times 2 ng.  No rrecurrent n/v.  Denies c pains, sob.  Repeat labs planned for the morning as per cardio, as well as cardio re-eval. Pt signed out to day team PA and ATT CDU SHELLY Metcalf - Patient states he is having some shortness of breath this am. States he did not have this yesterday. No chest pain complaints this am.  Exam: heart- RRR s1s2 nl Lungs - scattered rales at bases  Abd - soft NT ND extem- no edema or cyanosis  Labs :  Na 137 K 3.6 hgb 8.7 hct 26.9 cl craet 1.4 lactate 2.7 gluc 74  BNP 8319  Plan for PO challenge this am. Cardiology called for follow up. Discussed patient medications with  cardiology fellow. Will give Torsemide 10 mg x 1 this am ( 1/2 patient's home dose ). EP service to see patient this am. Continue to monitor. CDU PA Tea - Patient states he is having some shortness of breath this am. States he did not have this yesterday. No chest pain complaints this am.  Exam: heart- RRR s1s2 nl Lungs - scattered rales at bases  Abd - soft NT ND extem- no edema or cyanosis  Labs :  Na 137 K 3.6 hgb 8.7 hct 26.9 cl craet 1.4 lactate 2.7 gluc 74  BNP 8319  Plan for PO challenge this am. Cardiology called for follow up. Discussed patient medications with  cardiology fellow. Will give Torsemide 10 mg x 1 this am ( 1/2 patient's home dose ). EP service to see patient this am. Continue to monitor.  Veronica att: I performed a face to face evaluation of this patient and obtained a history and performed a full exam.  I agree with the history, physical exam and plan of the PA.  Patient with bibasilar rales, likely due to over rescusitation, fluids halted.  Patient tolerating PO, during breakfast reports nausea again, no abdominal pain, no vomit, abdomen benign on exam. Lacitic acid still slighly elevated, no e/o infection, no metformin use, likely due to poor EF.   Awaiting cardiology consult. CDU SHELLY Metcalf - Patient seen by cardiology fellow this am.  Case reviewed by them. Plan for echocardiogram today. Fluid management needed from their standpoint for CHF. Will plan to admit to dr Marquez for further work up. Veronica CDU attending admission note: Patient with severe CHF, difficult fluid balance.  Evaluated by cardiology, admit for titration of diuretics and optimization.   Admitted and endorsed to [facility]. CDU SHELLY Metcalf - Patient seen by cardiology fellow this am.  Case reviewed by them. Plan for echocardiogram today. Fluid management needed from their standpoint for CHF. Will plan to admit to dr Marquez for further work up.  Veronica att: I performed a face to face evaluation of this patient and obtained a history and performed a full exam.  I agree with the history, physical exam and plan of the PA.

## 2017-04-29 NOTE — H&P ADULT. - ASSESSMENT
66 y/o male, with a PmHx of CHF s/p AICD ([employer]), Afib on Xarelto w/history of ablation, Nonischemic Cardiomyopathy, presented to the [facility] ED from the Cardiology clinic for lightheadedness, nausea and emesis, is being admitted to telemetry for acute on chronic systolic heart failure and new severe MR.

## 2017-04-29 NOTE — ED CDU PROVIDER NOTE - PLAN OF CARE
Resolution of symptoms Plan to admit to dr Marquez for further work up of CHF and gastroenteritis and dehydration.

## 2017-04-29 NOTE — H&P ADULT. - HISTORY OF PRESENT ILLNESS
68 y/o male, with a PmHx of CHF s/p AICD ([employer]), Afib on Xarelto w/history of ablation, Nonischemic Cardiomyopathy, presented to the [facility] ED from the Cardiology clinic for lightheadedness, nausea and emesis. Pt states he was having lightheadedness for the past few days and otherwise developed nausea/emesis after eating a turkey burger. He states he had 5 episodes of emesis so he went to see Dr. Kessler in his office yesterday. He was also c/o lethargy/fatigue. he denies fever, chills, chest pain, HA, dizziness, blurred vision, recent travel. While in the Cardiology office he had ICD interrogation done which showed no events but was sent into the ED for further evaluation. While in the ED, he was placed in CDU for lightheadedness. He was given about 750cc NS for dehydration but developed sob duet to acute fluid overload and is now being admitted to telemetry for acute on chronic systolic heart failure and new severe MR.

## 2017-04-29 NOTE — H&P ADULT. - NEGATIVE CARDIOVASCULAR SYMPTOMS
no peripheral edema/no chest pain/no orthopnea/no dyspnea on exertion/no palpitations/no paroxysmal nocturnal dyspnea

## 2017-04-29 NOTE — H&P ADULT. - PSH
AICD (Automatic Cardioverter/Defibrillator) Present  [employer] with 1 [employer] lead4/1/09- explanted and replaced with [employer] 2 leads on 9/2/09  History of Prior Ablation Treatment  for afib  Status post left hip replacement

## 2017-04-29 NOTE — H&P ADULT. - NEGATIVE OPHTHALMOLOGIC SYMPTOMS
no loss of vision L/no photophobia/no diplopia/no blurred vision L/no blurred vision R/no loss of vision R

## 2017-04-29 NOTE — ED CDU PROVIDER NOTE - PSH
AICD (Automatic Cardioverter/Defibrillator) Present  [employer] with 1 [employer] lead4/1/09- explanted and replaced with [employer] 2 leads on 9/2/09  History of Prior Ablation Treatment    Status post left hip replacement

## 2017-04-29 NOTE — ED CDU PROVIDER NOTE - OBJECTIVE STATEMENT
68YO M PMHx of CHF , s/p AICD approx 4 years ago, afib on xarelto, nonischemic cardiomyopathy presents to the ED co a fall this morning when he  felt lightheaded as well as nausea/vomiting.   Pt was at Dr torres's office this morning for a routine AICD interrogation, which showed no events, and then he was sent from there to be further evaluated. (sees dr LAUREN Zamarripa for cardio)   Pt Complains of nausea and vomiting last night after eating a turkey burger at an elder center. 5 episodes of NBNB vomiting with last episode last night. No sick contacts. No fevers, chills, recent travel,, abdom pain, CP, SOB, diarrhea (last BM was 2 days prior and was NL). This morning patient had 2 bites of food and was nauseated and thus didn't eat anymore. Was rising from his seat , felt lightheaded and he fell backward striking his buttock and his b/l elbows. Denies any LOC, HA, striking his head, CP, SOB, palpitations,  at the time. At current time pt resting comfortably, in NAD. No recurrent n/v.  SW cardio who will see pt in the CDU.

## 2017-04-29 NOTE — ED CDU PROVIDER NOTE - MEDICAL DECISION MAKING DETAILS
lightheaded, n/v: PM interrogated earlier today at cardio with no events, labs showing elevation in creat/bun likely 2/2 dehydration. Will give fluids slow, monitor labs, obtain ekg with 2nd set, observe on tele, get cardio eval, cxr , ua, ct head, zofran prn, re-eval 68 yo male, llightheaded, n/v being tx for gastro and dehtdration in CDU: PM interrogated earlier today at cardio with no events, labs showing elevation in creat/bun likely 2/2 dehydration. Will give fluids slow, monitor labs,  anti-emetics prn, obtain ekg with 2nd set, observe on tele, get cardio eval, cxr , ua, ct head, re-eval 66 yo male, llightheaded, n/v being tx for gastro and dehtdration in CDU: PM interrogated earlier today at cardio with no events, labs showing elevation in creat/bun likely 2/2 dehydration. Will give fluids slow(250 cc bolus every 4 hours as per att mealie), monitor labs,  anti-emetics prn, obtain ekg with 2nd set, observe on tele, get cardio eval, cxr , ua, ct head, re-eval

## 2017-04-29 NOTE — ED CDU PROVIDER NOTE - ATTENDING CONTRIBUTION TO CARE
CDU Attending Note - Dr. Aguayo    66YO M PMHx of CHF s/p AICD approx 4 years ago, afib on xarelto, nonischemic cardiomyopathy presents with N/V s/p fall in the AM sent from cardiologist office. Patient states been having lightheadedness for the past few days with Nausea and vomiting after eating a turkey burger    PE: pt is alert and oriented, perrl, ent normal, membranes are dry, neck supple. no lymphadenopathy or thyroid enlargement, No JVD.  Chest clear to P&A, Heart- reg rhythm with multiple premature beats.  No murmur, rubs or gallops, radial pulses equal bilaterally.  Abd is soft, non-tender, Bowel sounds are active. no mass or organomegaly. : No CVA tenderness. Neuro:  Pt alert and oriented x 3. Perrl    Distal neurosensory is intact. Motor function is 5/5 strength bilaterally.  No focal deficits. Extremities:  No edema.  Skin: warm and dry.  Imp dehydration with elevated BUN/Creatine ratio and unable to hold down his Metoprolol 25mg ER and his Mexilitine.  Plan theresa hydration, possible CT of head to R/O bleed.  Monitor shows PVC's in doublets and triplets.

## 2017-04-29 NOTE — H&P ADULT. - PROBLEM SELECTOR PLAN 1
ekg/telemetry, ce x 2 neg, repeat echo done shows severe MR. Consider CTS c/s, probnp, daily wts, fluid restcriticiton, strict & Ols, con't torsemide

## 2017-04-30 LAB
BUN SERPL-MCNC: 31 MG/DL — HIGH (ref 7–23)
CALCIUM SERPL-MCNC: 8.8 MG/DL — SIGNIFICANT CHANGE UP (ref 8.4–10.5)
CHLORIDE SERPL-SCNC: 101 MMOL/L — SIGNIFICANT CHANGE UP (ref 98–107)
CO2 SERPL-SCNC: 20 MMOL/L — LOW (ref 22–31)
CREAT SERPL-MCNC: 1.6 MG/DL — HIGH (ref 0.5–1.3)
GLUCOSE SERPL-MCNC: 75 MG/DL — SIGNIFICANT CHANGE UP (ref 70–99)
HCT VFR BLD CALC: 29.7 % — LOW (ref 39–50)
HGB BLD-MCNC: 9.5 G/DL — LOW (ref 13–17)
MCHC RBC-ENTMCNC: 29.5 PG — SIGNIFICANT CHANGE UP (ref 27–34)
MCHC RBC-ENTMCNC: 32 % — SIGNIFICANT CHANGE UP (ref 32–36)
MCV RBC AUTO: 92.2 FL — SIGNIFICANT CHANGE UP (ref 80–100)
PLATELET # BLD AUTO: 328 K/UL — SIGNIFICANT CHANGE UP (ref 150–400)
PMV BLD: 10.9 FL — SIGNIFICANT CHANGE UP (ref 7–13)
POTASSIUM SERPL-MCNC: 3.8 MMOL/L — SIGNIFICANT CHANGE UP (ref 3.5–5.3)
POTASSIUM SERPL-SCNC: 3.8 MMOL/L — SIGNIFICANT CHANGE UP (ref 3.5–5.3)
RBC # BLD: 3.22 M/UL — LOW (ref 4.2–5.8)
RBC # FLD: 18.4 % — HIGH (ref 10.3–14.5)
SODIUM SERPL-SCNC: 139 MMOL/L — SIGNIFICANT CHANGE UP (ref 135–145)
WBC # BLD: 6.67 K/UL — SIGNIFICANT CHANGE UP (ref 3.8–10.5)
WBC # FLD AUTO: 6.67 K/UL — SIGNIFICANT CHANGE UP (ref 3.8–10.5)

## 2017-04-30 PROCEDURE — 99232 SBSQ HOSP IP/OBS MODERATE 35: CPT | Mod: GC

## 2017-04-30 RX ORDER — POLYETHYLENE GLYCOL 3350 17 G/17G
17 POWDER, FOR SOLUTION ORAL ONCE
Qty: 0 | Refills: 0 | Status: COMPLETED | OUTPATIENT
Start: 2017-04-30 | End: 2017-04-30

## 2017-04-30 RX ADMIN — MEXILETINE HYDROCHLORIDE 150 MILLIGRAM(S): 150 CAPSULE ORAL at 07:26

## 2017-04-30 RX ADMIN — MEXILETINE HYDROCHLORIDE 150 MILLIGRAM(S): 150 CAPSULE ORAL at 13:57

## 2017-04-30 RX ADMIN — Medication 100 MILLIGRAM(S): at 22:11

## 2017-04-30 RX ADMIN — Medication 100 MILLIGRAM(S): at 06:18

## 2017-04-30 RX ADMIN — MEXILETINE HYDROCHLORIDE 150 MILLIGRAM(S): 150 CAPSULE ORAL at 22:11

## 2017-04-30 RX ADMIN — Medication 500 MILLIGRAM(S): at 11:49

## 2017-04-30 RX ADMIN — Medication 1 TABLET(S): at 11:49

## 2017-04-30 RX ADMIN — Medication 20 MILLIGRAM(S): at 20:34

## 2017-04-30 RX ADMIN — SENNA PLUS 2 TABLET(S): 8.6 TABLET ORAL at 22:11

## 2017-04-30 RX ADMIN — RIVAROXABAN 20 MILLIGRAM(S): KIT at 11:49

## 2017-04-30 RX ADMIN — PANTOPRAZOLE SODIUM 40 MILLIGRAM(S): 20 TABLET, DELAYED RELEASE ORAL at 06:18

## 2017-04-30 RX ADMIN — Medication 1 MILLIGRAM(S): at 11:49

## 2017-04-30 RX ADMIN — POLYETHYLENE GLYCOL 3350 17 GRAM(S): 17 POWDER, FOR SOLUTION ORAL at 17:14

## 2017-04-30 RX ADMIN — PANTOPRAZOLE SODIUM 40 MILLIGRAM(S): 20 TABLET, DELAYED RELEASE ORAL at 17:05

## 2017-04-30 NOTE — PHYSICAL THERAPY INITIAL EVALUATION ADULT - PERTINENT HX OF CURRENT PROBLEM, REHAB EVAL
66 y/o male, with a PmHx of CHF s/p AICD ([employer]), Afib on Xarelto w/history of ablation, Nonischemic Cardiomyopathy, presented to the [facility] ED from the Cardiology clinic for lightheadedness, nausea and emesis.

## 2017-04-30 NOTE — DIETITIAN INITIAL EVALUATION ADULT. - NS AS NUTRI INTERV ED CONTENT
Purpose of the nutrition education/Priority modifications/Nutrition relationship to health/disease/Recommended modifications

## 2017-04-30 NOTE — DIETITIAN INITIAL EVALUATION ADULT. - OTHER INFO
Pt. with good appetite/PO intake & denies food allergies, nausea/vomiting/diarrhea/constipation, or issues with chewing/swallowing.  Pt. states his weight went from "163lbs to 143lbs" (likely fluid related change).  Discussed and emphasized therapeutic diet modifications, of which Pt. teaches back limited prior knowledge of... although upon RDN attempt to go into further review, Pt. exhibits disinterest and does not demonstrate receptiveness. Advised RDN remains available.

## 2017-04-30 NOTE — PHYSICAL THERAPY INITIAL EVALUATION ADULT - PATIENT PROFILE REVIEW, REHAB EVAL
ACTIVITY: increase as tolerated. Spoke with DIXON Colvin prior to PT initial evaluation --> Pt OK for ambulation./yes

## 2017-04-30 NOTE — PHYSICAL THERAPY INITIAL EVALUATION ADULT - IMPAIRMENTS FOUND, PT EVAL
muscle strength/gross motor/poor safety awareness/gait, locomotion, and balance/aerobic capacity/endurance

## 2017-04-30 NOTE — DIETITIAN INITIAL EVALUATION ADULT. - NS AS NUTRI INTERV COLLABORAT
1)Continue DASH/TLC (cholesterol & Na restricted) diet.                 2)Obtain current & daily weights

## 2017-04-30 NOTE — PHYSICAL THERAPY INITIAL EVALUATION ADULT - ADDITIONAL COMMENTS
Patient reports that he lives alone in a house with 3 steps to negotiate to enter the house and 7 steps to negotiate to enter the bedroom. Brother occasionally stays with patient.     Prior to hospital admission, patient ambulated with assistance and a single axis cane. Patient has a rolling walker at home, however, does not like to ambulate with it 2/2 to prior fall.  Patient has a home health aide 3 days a week for 3 hours.    Patient was left comfortably in a semisupine position, NAD, all lines in tact, all precautions maintained, call bell in reach, bed alarm set post PT and RN Josette made aware of PT initial evaluation.

## 2017-04-30 NOTE — PHYSICAL THERAPY INITIAL EVALUATION ADULT - CRITERIA FOR SKILLED THERAPEUTIC INTERVENTIONS
risk reduction/prevention/rehab potential/impairments found/therapy frequency/functional limitations in following categories

## 2017-04-30 NOTE — PHYSICAL THERAPY INITIAL EVALUATION ADULT - DIAGNOSIS, PT EVAL
Patient admitted to hospital for lightheadedness, nausea, and emesis; presents with decreased endurance.

## 2017-05-01 ENCOUNTER — TRANSCRIPTION ENCOUNTER (OUTPATIENT)
Age: 67
End: 2017-05-01

## 2017-05-01 PROCEDURE — 99222 1ST HOSP IP/OBS MODERATE 55: CPT

## 2017-05-01 PROCEDURE — 99223 1ST HOSP IP/OBS HIGH 75: CPT

## 2017-05-01 RX ORDER — METOPROLOL TARTRATE 50 MG
25 TABLET ORAL DAILY
Qty: 0 | Refills: 0 | Status: DISCONTINUED | OUTPATIENT
Start: 2017-05-01 | End: 2017-05-02

## 2017-05-01 RX ADMIN — MEXILETINE HYDROCHLORIDE 150 MILLIGRAM(S): 150 CAPSULE ORAL at 06:45

## 2017-05-01 RX ADMIN — Medication 20 MILLIGRAM(S): at 06:45

## 2017-05-01 RX ADMIN — PANTOPRAZOLE SODIUM 40 MILLIGRAM(S): 20 TABLET, DELAYED RELEASE ORAL at 06:45

## 2017-05-01 RX ADMIN — RIVAROXABAN 20 MILLIGRAM(S): KIT at 14:07

## 2017-05-01 RX ADMIN — Medication 20 MILLIGRAM(S): at 18:01

## 2017-05-01 RX ADMIN — Medication 100 MILLIGRAM(S): at 14:06

## 2017-05-01 RX ADMIN — Medication 500 MILLIGRAM(S): at 14:06

## 2017-05-01 RX ADMIN — MEXILETINE HYDROCHLORIDE 150 MILLIGRAM(S): 150 CAPSULE ORAL at 14:06

## 2017-05-01 RX ADMIN — Medication 1 TABLET(S): at 14:06

## 2017-05-01 RX ADMIN — MEXILETINE HYDROCHLORIDE 150 MILLIGRAM(S): 150 CAPSULE ORAL at 21:33

## 2017-05-01 RX ADMIN — Medication 100 MILLIGRAM(S): at 06:45

## 2017-05-01 RX ADMIN — Medication 12.5 MILLIGRAM(S): at 06:48

## 2017-05-01 RX ADMIN — Medication 1 MILLIGRAM(S): at 14:06

## 2017-05-01 RX ADMIN — PANTOPRAZOLE SODIUM 40 MILLIGRAM(S): 20 TABLET, DELAYED RELEASE ORAL at 17:35

## 2017-05-01 NOTE — DISCHARGE NOTE ADULT - ADDITIONAL INSTRUCTIONS
Follow-up with Dr. Hurtado Follow-up with Dr. Júnior Hurtado within 1 week, please call and make an appointment.  Please have your basic metabolic panel checked within a few days to monitor your Creatinine with your Private Doctor.

## 2017-05-01 NOTE — PROVIDER CONTACT NOTE (OTHER) - RECOMMENDATIONS
continue to monitor for signs or symptoms of hypotension
Come to assess patient.
Educate patient not to exit the bed at this time.
continue to monitor patient
do not administer torsemide as per parameter

## 2017-05-01 NOTE — DISCHARGE NOTE ADULT - CARE PROVIDER_API CALL
Júnior Hurtado), Adv [facility]  [address] [place] [address]  Phone: [phone]  Fax: [phone]

## 2017-05-01 NOTE — DISCHARGE NOTE ADULT - HOSPITAL COURSE
68 y/o male, with a PmHx of CHF s/p AICD ([employer]), Afib on Xarelto w/history of ablation, Nonischemic Cardiomyopathy, was placed in CDU for lightheadedness. Pt now being admitted to telemetry for acute on chronic systolic heart failure.    + Acute on Chronic Systolic Heart Failure - on Torsemide 20mg PO BID  + New Severe MR  + Afib on Xarelto 66 y/o male, with a PmHx of CHF, Nonischemic Cardiomyopathy s/p AICD ([employer]), Afib on Xarelto s/p ablation, was placed in CDU for lightheadedness. Pt now being admitted to telemetry for acute on chronic systolic heart failure.    + Acute on Chronic Systolic Heart Failure - on Torsemide 20mg PO BID  + New Severe MR  + Afib on Xarelto     Hospital Course:  EKG: NSR @ 98, PVC's  CE x 2 neg               Lactate: 2.9-->2.7                BNP: 8,319              Bun/Cr: 29/1.48  4/29 CT Head - no acute intracranial hemorrhage, large cortical infarct or mas effect.   4/29 CXR - mild cardiomegaly with clear lungs  4/29 Echo - EF 23%, mitral annular calcification, otherwise normal mitral valve. Severe mitral regurg. Severely dilated LA. Moderate Left ventricular enlargement. Severe global left ventricular systolic dysfunction. Right ventricular enlargement with decreased right ventricular systolic function. As deveice wire is noted in the right heart. Severe pulm hypertension. Compared with echo of 1/3/17, more mitral regug is noted on the current study.  4/29 Cardio - severe MR, plan of admission, monitor bmp, tele monitoring, will follow closely  4/29 Cardio NP - from Cardio clinic for concern for low cardiac output. In ED, was given 750cc NS and placed in CDU. Frequent PVC's noted. Repleat K and Mag, Trend enzymes.  4/30 Cards: c/w torsemide, clinically appears euvolemic   5/3 HF: Pt Feels weak, Hold Discharge, Need to space out Meds  5/1 Heart Failure consult: increase Toprol to 25 QD, suspect filling pressures increased, Torsemide, if remains symptomatic then will repeat RHC  5/2 HF:  appears Euvolemic, has been missing toprol due to hypotension, suspect will need advanced  therapy soon,  Decreased Toprol to 12.5 QD, started Spironolactone 12.5 QD.  5/2 EP : Continue current Meds  5/3 HF; set up timing for HF meds,  space out meds, likely d/c in am if stable  5/3 EP; c/w current meds, if BP remains stable, likely d/c in am if OK w/ HF  5/4 HF: Likely D/C Planning in am if pt Tolerates BP Meds without C/O dizziness 68 y/o male, with a PmHx of CHF, Nonischemic Cardiomyopathy s/p AICD ([employer]), Afib on Xarelto s/p ablation, was placed in CDU for lightheadedness. Pt now being admitted to telemetry for acute on chronic systolic heart failure.    + Acute on Chronic Systolic Heart Failure - on Torsemide 20mg PO BID  + New Severe MR  + Afib on Xarelto     Hospital Course:  EKG: NSR @ 98, PVC's  CE x 2 neg               Lactate: 2.9-->2.7                BNP: 8,319              Bun/Cr: 29/1.48  4/29 CT Head - no acute intracranial hemorrhage, large cortical infarct or mas effect.   4/29 CXR - mild cardiomegaly with clear lungs  4/29 Echo - EF 23%, mitral annular calcification, otherwise normal mitral valve. Severe mitral regurg. Severely dilated LA. Moderate Left ventricular enlargement. Severe global left ventricular systolic dysfunction. Right ventricular enlargement with decreased right ventricular systolic function. As deveice wire is noted in the right heart. Severe pulm hypertension. Compared with echo of 1/3/17, more mitral regug is noted on the current study.  4/29 Cardio - severe MR, plan of admission, monitor bmp, tele monitoring, will follow closely  4/29 Cardio NP - from Cardio clinic for concern for low cardiac output. In ED, was given 750cc NS and placed in CDU. Frequent PVC's noted. Repleat K and Mag, Trend enzymes.  4/30 Cards: c/w torsemide, clinically appears euvolemic   5/3 HF: Pt Feels weak, Hold Discharge, Need to space out Meds  5/1 Heart Failure consult: increase Toprol to 25 QD, suspect filling pressures increased, Torsemide, if remains symptomatic then will repeat RHC  5/2 HF:  appears Euvolemic, has been missing toprol due to hypotension, suspect will need advanced  therapy soon,  Decreased Toprol to 12.5 QD, started Spironolactone 12.5 QD.  5/2 EP : Continue current Meds  5/3 HF; set up timing for HF meds,  space out meds, likely d/c in am if stable  5/3 EP; c/w current meds, if BP remains stable, likely d/c in am if OK w/ HF  5/4 HF: Likely D/C Planning in am if pt Tolerates BP Meds without C/O dizziness  5/5 Pt is medically stable for discharge home today as per Dr.Sandeep Hurtado. To follow-up Creatinine within a few days. Continue present medications.

## 2017-05-01 NOTE — DISCHARGE NOTE ADULT - PLAN OF CARE
Follow up with Dr. Hurtado prevent SOB Continue to monitor. Rate control. Continue Xarelto.  Continue Metoprolol. Reduce blood pressure. Continue Torsemide, Spironolactone, Metoprolol.   Monitor your blood pressure. Continue Torsemide, Spironolactone, current medications.   Monitor your fluid intake, urine output, weight.   Follow-up with Dr. Hurtado

## 2017-05-01 NOTE — DISCHARGE NOTE ADULT - MEDICATION SUMMARY - MEDICATIONS TO CHANGE
I will SWITCH the dose or number of times a day I take the medications listed below when I get home from the hospital:    torsemide 20 mg oral tablet  -- 2 tab(s) by mouth once a day

## 2017-05-01 NOTE — PROVIDER CONTACT NOTE (OTHER) - ASSESSMENT
pt A&OX4. BP 86/58. HR 86. pt is asymptomatic of hypotension.
pt A&OX4. pt is asymptomatic. pt had 5 beats nonsustained vtach. HR 98. /70. RR 17. 100% oxygen
pt A&XO4. pt is asymptomatic of hypotension. BP 92/72 auscultated manually. HR 95.
pt BP 88/64 auscultated manually. HR 84. Temp 97.7.  RR 17. 100% pulse ox on room air. pt is asymptomatic of hypotension.
A&OX4. Denies symptoms of hypotension. BP 98/80 auscultated with stethoscope. . RR 17. 99% room air.

## 2017-05-01 NOTE — DISCHARGE NOTE ADULT - HOME CARE AGENCY
[facility] for RN, PT services.  Please call [phone] with questions/concerns.

## 2017-05-01 NOTE — DISCHARGE NOTE ADULT - MEDICATION SUMMARY - MEDICATIONS TO TAKE
I will START or STAY ON the medications listed below when I get home from the hospital:    spironolactone 25 mg oral tablet  -- 0.5 tab(s) by mouth once a day  -- Indication: For Congestive heart failure     acetaminophen 325 mg oral tablet  -- 2 tab(s) by mouth every 6 hours, As needed, mild, moderate pain  -- Indication: For PAin    mexiletine 150 mg oral capsule  -- 1 cap(s) by mouth 3 times a day  -- Indication: For A-fib     rivaroxaban 20 mg oral tablet  -- 1 tab(s) by mouth once a day  -- Indication: For A-fib     metoprolol succinate  -- 12.5 milligram(s) by mouth once a day  -- Indication: For A-fib    torsemide 20 mg oral tablet  -- 1 tab(s) by mouth once a day  -- Indication: For Congestive heart failure     senna oral tablet  -- 2 tab(s) by mouth once a day (at bedtime)  -- Indication: For laxative     docusate sodium 100 mg oral capsule  -- 1 cap(s) by mouth 3 times a day  -- Indication: For laxative     pantoprazole 40 mg oral delayed release tablet  -- 1 tab(s) by mouth 2 times a day (before meals)  -- Indication: For GERD    Centrum oral tablet  -- 1 tab(s) by mouth once a day  -- Indication: For Vitamin    Oyster Shell Calcium with Vitamin D 500 mg-200 intl units oral tablet  -- 1 tab(s) by mouth once a day  -- Indication: For Vitamin    folic acid 1 mg oral tablet  -- 1 tab(s) by mouth once a day  -- Indication: For Vitamin    Vitamin C 500 mg oral tablet  -- 1 tab(s) by mouth once a day  -- Indication: For Vitamin I will START or STAY ON the medications listed below when I get home from the hospital:    spironolactone 25 mg oral tablet  -- 0.5 tab(s) by mouth once a day  -- Indication: For CHF (congestive heart failure)    acetaminophen 325 mg oral tablet  -- 2 tab(s) by mouth every 6 hours, As needed, mild, moderate pain  -- Indication: For PAin    mexiletine 150 mg oral capsule  -- 1 cap(s) by mouth 3 times a day  -- Indication: For A-fib     rivaroxaban 20 mg oral tablet  -- 1 tab(s) by mouth once a day  -- Indication: For A-fib     metoprolol succinate  -- 12.5 milligram(s) by mouth once a day  -- Indication: For A-fib    torsemide 20 mg oral tablet  -- 1 tab(s) by mouth once a day  -- Indication: For CHF (congestive heart failure)    senna oral tablet  -- 2 tab(s) by mouth once a day (at bedtime)  -- Indication: For laxative     docusate sodium 100 mg oral capsule  -- 1 cap(s) by mouth 3 times a day  -- Indication: For laxative     pantoprazole 40 mg oral delayed release tablet  -- 1 tab(s) by mouth 2 times a day (before meals)  -- Indication: For GERD    Centrum oral tablet  -- 1 tab(s) by mouth once a day  -- Indication: For Vitamin    Oyster Shell Calcium with Vitamin D 500 mg-200 intl units oral tablet  -- 1 tab(s) by mouth once a day  -- Indication: For Vitamin    folic acid 1 mg oral tablet  -- 1 tab(s) by mouth once a day  -- Indication: For Vitamin    Vitamin C 500 mg oral tablet  -- 1 tab(s) by mouth once a day  -- Indication: For Vitamin

## 2017-05-01 NOTE — PROVIDER CONTACT NOTE (OTHER) - BACKGROUND
pt admitted for acute on chronic CHF
pt admitted for acute on chronic CHF exacerbation

## 2017-05-01 NOTE — DISCHARGE NOTE ADULT - PATIENT PORTAL LINK FT
“You can access the [employer] Patient Portal, offered by [facility], by registering with the following website: [email or web address]”

## 2017-05-01 NOTE — DISCHARGE NOTE ADULT - OTHER SIGNIFICANT FINDINGS
(PMH) Ventricular fibrillation  (PMH) PAF (paroxysmal atrial fibrillation)  (PMH) Non-Ischemic Cardiomyopathy  (PMH) SVT (Supraventricular Tachycardia)  (PMH) HTN  (PMH) CHF (Congestive Heart Failure)  ((PSH) History of Prior Ablation Treatment  (PSH) Status post left hip replacement  (PSH) AICD (Automatic Cardioverter/Defibrillator) Present

## 2017-05-02 LAB
BUN SERPL-MCNC: 18 MG/DL — SIGNIFICANT CHANGE UP (ref 7–23)
CALCIUM SERPL-MCNC: 8.4 MG/DL — SIGNIFICANT CHANGE UP (ref 8.4–10.5)
CHLORIDE SERPL-SCNC: 101 MMOL/L — SIGNIFICANT CHANGE UP (ref 98–107)
CO2 SERPL-SCNC: 24 MMOL/L — SIGNIFICANT CHANGE UP (ref 22–31)
CREAT SERPL-MCNC: 1.11 MG/DL — SIGNIFICANT CHANGE UP (ref 0.5–1.3)
GLUCOSE SERPL-MCNC: 90 MG/DL — SIGNIFICANT CHANGE UP (ref 70–99)
MAGNESIUM SERPL-MCNC: 2 MG/DL — SIGNIFICANT CHANGE UP (ref 1.6–2.6)
POTASSIUM SERPL-MCNC: 3.1 MMOL/L — LOW (ref 3.5–5.3)
POTASSIUM SERPL-SCNC: 3.1 MMOL/L — LOW (ref 3.5–5.3)
SODIUM SERPL-SCNC: 142 MMOL/L — SIGNIFICANT CHANGE UP (ref 135–145)

## 2017-05-02 PROCEDURE — 99232 SBSQ HOSP IP/OBS MODERATE 35: CPT

## 2017-05-02 RX ORDER — PANTOPRAZOLE SODIUM 20 MG/1
1 TABLET, DELAYED RELEASE ORAL
Qty: 0 | Refills: 0 | COMMUNITY
Start: 2017-05-02

## 2017-05-02 RX ORDER — POTASSIUM CHLORIDE 20 MEQ
40 PACKET (EA) ORAL EVERY 4 HOURS
Qty: 0 | Refills: 0 | Status: COMPLETED | OUTPATIENT
Start: 2017-05-02 | End: 2017-05-02

## 2017-05-02 RX ORDER — METOPROLOL TARTRATE 50 MG
12.5 TABLET ORAL DAILY
Qty: 0 | Refills: 0 | Status: DISCONTINUED | OUTPATIENT
Start: 2017-05-02 | End: 2017-05-03

## 2017-05-02 RX ORDER — METOPROLOL TARTRATE 50 MG
1 TABLET ORAL
Qty: 30 | Refills: 0 | OUTPATIENT
Start: 2017-05-02 | End: 2017-06-01

## 2017-05-02 RX ORDER — SPIRONOLACTONE 25 MG/1
12.5 TABLET, FILM COATED ORAL DAILY
Qty: 0 | Refills: 0 | Status: DISCONTINUED | OUTPATIENT
Start: 2017-05-02 | End: 2017-05-03

## 2017-05-02 RX ADMIN — PANTOPRAZOLE SODIUM 40 MILLIGRAM(S): 20 TABLET, DELAYED RELEASE ORAL at 17:20

## 2017-05-02 RX ADMIN — Medication 500 MILLIGRAM(S): at 11:41

## 2017-05-02 RX ADMIN — Medication 100 MILLIGRAM(S): at 05:49

## 2017-05-02 RX ADMIN — MEXILETINE HYDROCHLORIDE 150 MILLIGRAM(S): 150 CAPSULE ORAL at 05:49

## 2017-05-02 RX ADMIN — MEXILETINE HYDROCHLORIDE 150 MILLIGRAM(S): 150 CAPSULE ORAL at 12:51

## 2017-05-02 RX ADMIN — Medication 100 MILLIGRAM(S): at 12:50

## 2017-05-02 RX ADMIN — Medication 1 TABLET(S): at 11:42

## 2017-05-02 RX ADMIN — Medication 40 MILLIEQUIVALENT(S): at 11:41

## 2017-05-02 RX ADMIN — Medication 25 MILLIGRAM(S): at 12:51

## 2017-05-02 RX ADMIN — Medication 1 TABLET(S): at 11:41

## 2017-05-02 RX ADMIN — Medication 40 MILLIEQUIVALENT(S): at 17:18

## 2017-05-02 RX ADMIN — Medication 1 MILLIGRAM(S): at 11:42

## 2017-05-02 RX ADMIN — PANTOPRAZOLE SODIUM 40 MILLIGRAM(S): 20 TABLET, DELAYED RELEASE ORAL at 05:49

## 2017-05-02 RX ADMIN — MEXILETINE HYDROCHLORIDE 150 MILLIGRAM(S): 150 CAPSULE ORAL at 21:28

## 2017-05-02 RX ADMIN — Medication 20 MILLIGRAM(S): at 17:20

## 2017-05-02 RX ADMIN — RIVAROXABAN 20 MILLIGRAM(S): KIT at 11:42

## 2017-05-03 LAB
BUN SERPL-MCNC: 20 MG/DL — SIGNIFICANT CHANGE UP (ref 7–23)
CALCIUM SERPL-MCNC: 8.9 MG/DL — SIGNIFICANT CHANGE UP (ref 8.4–10.5)
CHLORIDE SERPL-SCNC: 99 MMOL/L — SIGNIFICANT CHANGE UP (ref 98–107)
CO2 SERPL-SCNC: 21 MMOL/L — LOW (ref 22–31)
CREAT SERPL-MCNC: 1.15 MG/DL — SIGNIFICANT CHANGE UP (ref 0.5–1.3)
GLUCOSE SERPL-MCNC: 97 MG/DL — SIGNIFICANT CHANGE UP (ref 70–99)
MAGNESIUM SERPL-MCNC: 1.8 MG/DL — SIGNIFICANT CHANGE UP (ref 1.6–2.6)
POTASSIUM SERPL-MCNC: 4.2 MMOL/L — SIGNIFICANT CHANGE UP (ref 3.5–5.3)
POTASSIUM SERPL-SCNC: 4.2 MMOL/L — SIGNIFICANT CHANGE UP (ref 3.5–5.3)
SODIUM SERPL-SCNC: 137 MMOL/L — SIGNIFICANT CHANGE UP (ref 135–145)

## 2017-05-03 PROCEDURE — 99233 SBSQ HOSP IP/OBS HIGH 50: CPT

## 2017-05-03 RX ORDER — METOPROLOL TARTRATE 50 MG
12.5 TABLET ORAL DAILY
Qty: 0 | Refills: 0 | Status: DISCONTINUED | OUTPATIENT
Start: 2017-05-04 | End: 2017-05-05

## 2017-05-03 RX ORDER — SPIRONOLACTONE 25 MG/1
12.5 TABLET, FILM COATED ORAL DAILY
Qty: 0 | Refills: 0 | Status: DISCONTINUED | OUTPATIENT
Start: 2017-05-04 | End: 2017-05-05

## 2017-05-03 RX ADMIN — Medication 650 MILLIGRAM(S): at 09:35

## 2017-05-03 RX ADMIN — Medication 100 MILLIGRAM(S): at 21:36

## 2017-05-03 RX ADMIN — Medication 12.5 MILLIGRAM(S): at 06:54

## 2017-05-03 RX ADMIN — Medication 650 MILLIGRAM(S): at 10:05

## 2017-05-03 RX ADMIN — RIVAROXABAN 20 MILLIGRAM(S): KIT at 13:13

## 2017-05-03 RX ADMIN — Medication 1 TABLET(S): at 13:13

## 2017-05-03 RX ADMIN — PANTOPRAZOLE SODIUM 40 MILLIGRAM(S): 20 TABLET, DELAYED RELEASE ORAL at 17:44

## 2017-05-03 RX ADMIN — Medication 1 MILLIGRAM(S): at 13:13

## 2017-05-03 RX ADMIN — SENNA PLUS 2 TABLET(S): 8.6 TABLET ORAL at 21:36

## 2017-05-03 RX ADMIN — Medication 500 MILLIGRAM(S): at 13:13

## 2017-05-03 RX ADMIN — Medication 20 MILLIGRAM(S): at 06:54

## 2017-05-03 RX ADMIN — Medication 100 MILLIGRAM(S): at 06:53

## 2017-05-03 RX ADMIN — PANTOPRAZOLE SODIUM 40 MILLIGRAM(S): 20 TABLET, DELAYED RELEASE ORAL at 06:54

## 2017-05-03 RX ADMIN — MEXILETINE HYDROCHLORIDE 150 MILLIGRAM(S): 150 CAPSULE ORAL at 06:54

## 2017-05-03 RX ADMIN — Medication 20 MILLIGRAM(S): at 17:44

## 2017-05-03 RX ADMIN — Medication 100 MILLIGRAM(S): at 13:14

## 2017-05-03 RX ADMIN — MEXILETINE HYDROCHLORIDE 150 MILLIGRAM(S): 150 CAPSULE ORAL at 21:37

## 2017-05-03 RX ADMIN — MEXILETINE HYDROCHLORIDE 150 MILLIGRAM(S): 150 CAPSULE ORAL at 13:14

## 2017-05-03 RX ADMIN — SPIRONOLACTONE 12.5 MILLIGRAM(S): 25 TABLET, FILM COATED ORAL at 06:54

## 2017-05-04 LAB
BUN SERPL-MCNC: 25 MG/DL — HIGH (ref 7–23)
CALCIUM SERPL-MCNC: 9 MG/DL — SIGNIFICANT CHANGE UP (ref 8.4–10.5)
CHLORIDE SERPL-SCNC: 96 MMOL/L — LOW (ref 98–107)
CO2 SERPL-SCNC: 20 MMOL/L — LOW (ref 22–31)
CREAT SERPL-MCNC: 1.37 MG/DL — HIGH (ref 0.5–1.3)
GLUCOSE SERPL-MCNC: 98 MG/DL — SIGNIFICANT CHANGE UP (ref 70–99)
POTASSIUM SERPL-MCNC: 4.1 MMOL/L — SIGNIFICANT CHANGE UP (ref 3.5–5.3)
POTASSIUM SERPL-SCNC: 4.1 MMOL/L — SIGNIFICANT CHANGE UP (ref 3.5–5.3)
SODIUM SERPL-SCNC: 135 MMOL/L — SIGNIFICANT CHANGE UP (ref 135–145)

## 2017-05-04 PROCEDURE — 99232 SBSQ HOSP IP/OBS MODERATE 35: CPT

## 2017-05-04 RX ORDER — LANOLIN ALCOHOL/MO/W.PET/CERES
3 CREAM (GRAM) TOPICAL AT BEDTIME
Qty: 0 | Refills: 0 | Status: DISCONTINUED | OUTPATIENT
Start: 2017-05-04 | End: 2017-05-05

## 2017-05-04 RX ORDER — METOPROLOL TARTRATE 50 MG
12.5 TABLET ORAL ONCE
Qty: 0 | Refills: 0 | Status: COMPLETED | OUTPATIENT
Start: 2017-05-04 | End: 2017-05-04

## 2017-05-04 RX ADMIN — Medication 1 MILLIGRAM(S): at 12:59

## 2017-05-04 RX ADMIN — Medication 100 MILLIGRAM(S): at 12:59

## 2017-05-04 RX ADMIN — Medication 12.5 MILLIGRAM(S): at 10:26

## 2017-05-04 RX ADMIN — MEXILETINE HYDROCHLORIDE 150 MILLIGRAM(S): 150 CAPSULE ORAL at 21:03

## 2017-05-04 RX ADMIN — RIVAROXABAN 20 MILLIGRAM(S): KIT at 12:59

## 2017-05-04 RX ADMIN — Medication 3 MILLIGRAM(S): at 21:03

## 2017-05-04 RX ADMIN — MEXILETINE HYDROCHLORIDE 150 MILLIGRAM(S): 150 CAPSULE ORAL at 12:59

## 2017-05-04 RX ADMIN — PANTOPRAZOLE SODIUM 40 MILLIGRAM(S): 20 TABLET, DELAYED RELEASE ORAL at 17:20

## 2017-05-04 RX ADMIN — Medication 1 TABLET(S): at 12:59

## 2017-05-04 RX ADMIN — Medication 20 MILLIGRAM(S): at 12:59

## 2017-05-04 RX ADMIN — Medication 100 MILLIGRAM(S): at 05:23

## 2017-05-04 RX ADMIN — Medication 500 MILLIGRAM(S): at 12:59

## 2017-05-04 RX ADMIN — PANTOPRAZOLE SODIUM 40 MILLIGRAM(S): 20 TABLET, DELAYED RELEASE ORAL at 05:23

## 2017-05-04 RX ADMIN — MEXILETINE HYDROCHLORIDE 150 MILLIGRAM(S): 150 CAPSULE ORAL at 05:23

## 2017-05-05 VITALS
DIASTOLIC BLOOD PRESSURE: 68 MMHG | OXYGEN SATURATION: 100 % | TEMPERATURE: 98 F | RESPIRATION RATE: 18 BRPM | HEART RATE: 87 BPM | SYSTOLIC BLOOD PRESSURE: 94 MMHG

## 2017-05-05 LAB
BUN SERPL-MCNC: 29 MG/DL — HIGH (ref 7–23)
CALCIUM SERPL-MCNC: 9.2 MG/DL — SIGNIFICANT CHANGE UP (ref 8.4–10.5)
CHLORIDE SERPL-SCNC: 98 MMOL/L — SIGNIFICANT CHANGE UP (ref 98–107)
CO2 SERPL-SCNC: 22 MMOL/L — SIGNIFICANT CHANGE UP (ref 22–31)
CREAT SERPL-MCNC: 1.45 MG/DL — HIGH (ref 0.5–1.3)
GLUCOSE SERPL-MCNC: 85 MG/DL — SIGNIFICANT CHANGE UP (ref 70–99)
POTASSIUM SERPL-MCNC: 4.2 MMOL/L — SIGNIFICANT CHANGE UP (ref 3.5–5.3)
POTASSIUM SERPL-SCNC: 4.2 MMOL/L — SIGNIFICANT CHANGE UP (ref 3.5–5.3)
SODIUM SERPL-SCNC: 136 MMOL/L — SIGNIFICANT CHANGE UP (ref 135–145)

## 2017-05-05 RX ORDER — SPIRONOLACTONE 25 MG/1
0.5 TABLET, FILM COATED ORAL
Qty: 15 | Refills: 1 | OUTPATIENT
Start: 2017-05-05 | End: 2017-07-03

## 2017-05-05 RX ORDER — ACETAMINOPHEN 500 MG
2 TABLET ORAL
Qty: 0 | Refills: 0 | COMMUNITY
Start: 2017-05-05

## 2017-05-05 RX ORDER — SPIRONOLACTONE 25 MG/1
0.5 TABLET, FILM COATED ORAL
Qty: 0 | Refills: 0 | COMMUNITY
Start: 2017-05-05

## 2017-05-05 RX ADMIN — MEXILETINE HYDROCHLORIDE 150 MILLIGRAM(S): 150 CAPSULE ORAL at 06:14

## 2017-05-05 RX ADMIN — Medication 20 MILLIGRAM(S): at 06:13

## 2017-05-05 RX ADMIN — Medication 12.5 MILLIGRAM(S): at 06:12

## 2017-05-05 RX ADMIN — SPIRONOLACTONE 12.5 MILLIGRAM(S): 25 TABLET, FILM COATED ORAL at 06:14

## 2017-05-05 RX ADMIN — PANTOPRAZOLE SODIUM 40 MILLIGRAM(S): 20 TABLET, DELAYED RELEASE ORAL at 06:13

## 2017-05-10 ENCOUNTER — INPATIENT (INPATIENT)
Facility: HOSPITAL | Age: 67
LOS: 8 days | Discharge: HOME HEALTH | DRG: 308 | End: 2017-05-19
Attending: INTERNAL MEDICINE | Admitting: INTERNAL MEDICINE
Payer: MEDICARE

## 2017-05-10 VITALS
SYSTOLIC BLOOD PRESSURE: 105 MMHG | HEART RATE: 112 BPM | DIASTOLIC BLOOD PRESSURE: 80 MMHG | RESPIRATION RATE: 17 BRPM | OXYGEN SATURATION: 100 % | TEMPERATURE: 96 F

## 2017-05-10 DIAGNOSIS — I48.0 PAROXYSMAL ATRIAL FIBRILLATION: ICD-10-CM

## 2017-05-10 DIAGNOSIS — N17.9 ACUTE KIDNEY FAILURE, UNSPECIFIED: ICD-10-CM

## 2017-05-10 DIAGNOSIS — R79.1 ABNORMAL COAGULATION PROFILE: ICD-10-CM

## 2017-05-10 DIAGNOSIS — I50.9 HEART FAILURE, UNSPECIFIED: ICD-10-CM

## 2017-05-10 DIAGNOSIS — Z29.9 ENCOUNTER FOR PROPHYLACTIC MEASURES, UNSPECIFIED: ICD-10-CM

## 2017-05-10 DIAGNOSIS — I47.2 VENTRICULAR TACHYCARDIA: ICD-10-CM

## 2017-05-10 DIAGNOSIS — R65.10 SYSTEMIC INFLAMMATORY RESPONSE SYNDROME (SIRS) OF NON-INFECTIOUS ORIGIN WITHOUT ACUTE ORGAN DYSFUNCTION: ICD-10-CM

## 2017-05-10 DIAGNOSIS — R74.0 NONSPECIFIC ELEVATION OF LEVELS OF TRANSAMINASE AND LACTIC ACID DEHYDROGENASE [LDH]: ICD-10-CM

## 2017-05-10 DIAGNOSIS — I50.43 ACUTE ON CHRONIC COMBINED SYSTOLIC (CONGESTIVE) AND DIASTOLIC (CONGESTIVE) HEART FAILURE: ICD-10-CM

## 2017-05-10 LAB
ALBUMIN SERPL ELPH-MCNC: 3.4 G/DL — SIGNIFICANT CHANGE UP (ref 3.3–5)
ALBUMIN SERPL ELPH-MCNC: 3.7 G/DL — SIGNIFICANT CHANGE UP (ref 3.3–5)
ALBUMIN SERPL ELPH-MCNC: 3.8 G/DL — SIGNIFICANT CHANGE UP (ref 3.3–5)
ALP SERPL-CCNC: 105 U/L — SIGNIFICANT CHANGE UP (ref 40–120)
ALP SERPL-CCNC: 95 U/L — SIGNIFICANT CHANGE UP (ref 40–120)
ALP SERPL-CCNC: 98 U/L — SIGNIFICANT CHANGE UP (ref 40–120)
ALT FLD-CCNC: 61 U/L RC — HIGH (ref 10–45)
ALT FLD-CCNC: 63 U/L RC — HIGH (ref 10–45)
ALT FLD-CCNC: 66 U/L RC — HIGH (ref 10–45)
ANION GAP SERPL CALC-SCNC: 18 MMOL/L — HIGH (ref 5–17)
ANION GAP SERPL CALC-SCNC: 20 MMOL/L — HIGH (ref 5–17)
ANION GAP SERPL CALC-SCNC: 22 MMOL/L — HIGH (ref 5–17)
APPEARANCE UR: ABNORMAL
APTT BLD: 41.3 SEC — HIGH (ref 27.5–37.4)
APTT BLD: 42.1 SEC — HIGH (ref 27.5–37.4)
APTT BLD: 46.3 SEC — HIGH (ref 27.5–37.4)
AST SERPL-CCNC: 75 U/L — HIGH (ref 10–40)
AST SERPL-CCNC: 79 U/L — HIGH (ref 10–40)
AST SERPL-CCNC: 88 U/L — HIGH (ref 10–40)
BACTERIA # UR AUTO: ABNORMAL /HPF
BASOPHILS # BLD AUTO: 0 K/UL — SIGNIFICANT CHANGE UP (ref 0–0.2)
BASOPHILS # BLD AUTO: 0.1 K/UL — SIGNIFICANT CHANGE UP (ref 0–0.2)
BASOPHILS NFR BLD AUTO: 0.5 % — SIGNIFICANT CHANGE UP (ref 0–2)
BASOPHILS NFR BLD AUTO: 0.8 % — SIGNIFICANT CHANGE UP (ref 0–2)
BILIRUB SERPL-MCNC: 1.4 MG/DL — HIGH (ref 0.2–1.2)
BILIRUB SERPL-MCNC: 1.9 MG/DL — HIGH (ref 0.2–1.2)
BILIRUB SERPL-MCNC: 2 MG/DL — HIGH (ref 0.2–1.2)
BILIRUB UR-MCNC: NEGATIVE — SIGNIFICANT CHANGE UP
BLD GP AB SCN SERPL QL: NEGATIVE — SIGNIFICANT CHANGE UP
BUN SERPL-MCNC: 40 MG/DL — HIGH (ref 7–23)
BUN SERPL-MCNC: 40 MG/DL — HIGH (ref 7–23)
BUN SERPL-MCNC: 41 MG/DL — HIGH (ref 7–23)
CALCIUM SERPL-MCNC: 9.2 MG/DL — SIGNIFICANT CHANGE UP (ref 8.4–10.5)
CALCIUM SERPL-MCNC: 9.5 MG/DL — SIGNIFICANT CHANGE UP (ref 8.4–10.5)
CALCIUM SERPL-MCNC: 9.6 MG/DL — SIGNIFICANT CHANGE UP (ref 8.4–10.5)
CHLORIDE SERPL-SCNC: 101 MMOL/L — SIGNIFICANT CHANGE UP (ref 96–108)
CHLORIDE SERPL-SCNC: 108 MMOL/L — SIGNIFICANT CHANGE UP (ref 96–108)
CHLORIDE SERPL-SCNC: 99 MMOL/L — SIGNIFICANT CHANGE UP (ref 96–108)
CK MB CFR SERPL CALC: 1.8 NG/ML — SIGNIFICANT CHANGE UP (ref 0–6.7)
CK SERPL-CCNC: 58 U/L — SIGNIFICANT CHANGE UP (ref 30–200)
CO2 SERPL-SCNC: 19 MMOL/L — LOW (ref 22–31)
CO2 SERPL-SCNC: 20 MMOL/L — LOW (ref 22–31)
CO2 SERPL-SCNC: 20 MMOL/L — LOW (ref 22–31)
COLOR SPEC: YELLOW — SIGNIFICANT CHANGE UP
CREAT SERPL-MCNC: 1.73 MG/DL — HIGH (ref 0.5–1.3)
CREAT SERPL-MCNC: 1.74 MG/DL — HIGH (ref 0.5–1.3)
CREAT SERPL-MCNC: 1.74 MG/DL — HIGH (ref 0.5–1.3)
DIFF PNL FLD: NEGATIVE — SIGNIFICANT CHANGE UP
EOSINOPHIL # BLD AUTO: 0 K/UL — SIGNIFICANT CHANGE UP (ref 0–0.5)
EOSINOPHIL # BLD AUTO: 0 K/UL — SIGNIFICANT CHANGE UP (ref 0–0.5)
EOSINOPHIL NFR BLD AUTO: 0.6 % — SIGNIFICANT CHANGE UP (ref 0–6)
EOSINOPHIL NFR BLD AUTO: 0.6 % — SIGNIFICANT CHANGE UP (ref 0–6)
GAS PNL BLDA: SIGNIFICANT CHANGE UP
GAS PNL BLDV: SIGNIFICANT CHANGE UP
GLUCOSE SERPL-MCNC: 122 MG/DL — HIGH (ref 70–99)
GLUCOSE SERPL-MCNC: 154 MG/DL — HIGH (ref 70–99)
GLUCOSE SERPL-MCNC: 87 MG/DL — SIGNIFICANT CHANGE UP (ref 70–99)
GLUCOSE UR QL: NEGATIVE — SIGNIFICANT CHANGE UP
HCT VFR BLD CALC: 30.2 % — LOW (ref 39–50)
HCT VFR BLD CALC: 31.8 % — LOW (ref 39–50)
HCT VFR BLD CALC: 35.6 % — LOW (ref 39–50)
HGB BLD-MCNC: 10.4 G/DL — LOW (ref 13–17)
HGB BLD-MCNC: 11.3 G/DL — LOW (ref 13–17)
HGB BLD-MCNC: 9.8 G/DL — LOW (ref 13–17)
HYALINE CASTS # UR AUTO: >50
INR BLD: 6.05 RATIO — CRITICAL HIGH (ref 0.88–1.16)
INR BLD: 6.21 RATIO — CRITICAL HIGH (ref 0.88–1.16)
INR BLD: 7.4 RATIO — CRITICAL HIGH (ref 0.88–1.16)
KETONES UR-MCNC: NEGATIVE — SIGNIFICANT CHANGE UP
LEUKOCYTE ESTERASE UR-ACNC: NEGATIVE — SIGNIFICANT CHANGE UP
LYMPHOCYTES # BLD AUTO: 2.1 K/UL — SIGNIFICANT CHANGE UP (ref 1–3.3)
LYMPHOCYTES # BLD AUTO: 2.9 K/UL — SIGNIFICANT CHANGE UP (ref 1–3.3)
LYMPHOCYTES # BLD AUTO: 30.7 % — SIGNIFICANT CHANGE UP (ref 13–44)
LYMPHOCYTES # BLD AUTO: 35.6 % — SIGNIFICANT CHANGE UP (ref 13–44)
MAGNESIUM SERPL-MCNC: 2.1 MG/DL — SIGNIFICANT CHANGE UP (ref 1.6–2.6)
MAGNESIUM SERPL-MCNC: 2.2 MG/DL — SIGNIFICANT CHANGE UP (ref 1.6–2.6)
MCHC RBC-ENTMCNC: 29.6 PG — SIGNIFICANT CHANGE UP (ref 27–34)
MCHC RBC-ENTMCNC: 30.1 PG — SIGNIFICANT CHANGE UP (ref 27–34)
MCHC RBC-ENTMCNC: 30.3 PG — SIGNIFICANT CHANGE UP (ref 27–34)
MCHC RBC-ENTMCNC: 31.7 GM/DL — LOW (ref 32–36)
MCHC RBC-ENTMCNC: 32.7 GM/DL — SIGNIFICANT CHANGE UP (ref 32–36)
MCHC RBC-ENTMCNC: 32.7 GM/DL — SIGNIFICANT CHANGE UP (ref 32–36)
MCV RBC AUTO: 92 FL — SIGNIFICANT CHANGE UP (ref 80–100)
MCV RBC AUTO: 92.6 FL — SIGNIFICANT CHANGE UP (ref 80–100)
MCV RBC AUTO: 93.3 FL — SIGNIFICANT CHANGE UP (ref 80–100)
MONOCYTES # BLD AUTO: 0.5 K/UL — SIGNIFICANT CHANGE UP (ref 0–0.9)
MONOCYTES # BLD AUTO: 0.8 K/UL — SIGNIFICANT CHANGE UP (ref 0–0.9)
MONOCYTES NFR BLD AUTO: 10 % — SIGNIFICANT CHANGE UP (ref 2–14)
MONOCYTES NFR BLD AUTO: 7.7 % — SIGNIFICANT CHANGE UP (ref 2–14)
NEUTROPHILS # BLD AUTO: 4.2 K/UL — SIGNIFICANT CHANGE UP (ref 1.8–7.4)
NEUTROPHILS # BLD AUTO: 4.4 K/UL — SIGNIFICANT CHANGE UP (ref 1.8–7.4)
NEUTROPHILS NFR BLD AUTO: 53.1 % — SIGNIFICANT CHANGE UP (ref 43–77)
NEUTROPHILS NFR BLD AUTO: 60.4 % — SIGNIFICANT CHANGE UP (ref 43–77)
NITRITE UR-MCNC: NEGATIVE — SIGNIFICANT CHANGE UP
NT-PROBNP SERPL-SCNC: 6957 PG/ML — HIGH (ref 0–300)
NT-PROBNP SERPL-SCNC: 7218 PG/ML — HIGH (ref 0–300)
PH UR: 5.5 — SIGNIFICANT CHANGE UP (ref 5–8)
PHOSPHATE SERPL-MCNC: 4.7 MG/DL — HIGH (ref 2.5–4.5)
PHOSPHATE SERPL-MCNC: 4.9 MG/DL — HIGH (ref 2.5–4.5)
PLATELET # BLD AUTO: 336 K/UL — SIGNIFICANT CHANGE UP (ref 150–400)
PLATELET # BLD AUTO: 337 K/UL — SIGNIFICANT CHANGE UP (ref 150–400)
PLATELET # BLD AUTO: 347 K/UL — SIGNIFICANT CHANGE UP (ref 150–400)
POTASSIUM SERPL-MCNC: 4.2 MMOL/L — SIGNIFICANT CHANGE UP (ref 3.5–5.3)
POTASSIUM SERPL-MCNC: 4.3 MMOL/L — SIGNIFICANT CHANGE UP (ref 3.5–5.3)
POTASSIUM SERPL-MCNC: 4.5 MMOL/L — SIGNIFICANT CHANGE UP (ref 3.5–5.3)
POTASSIUM SERPL-SCNC: 4.2 MMOL/L — SIGNIFICANT CHANGE UP (ref 3.5–5.3)
POTASSIUM SERPL-SCNC: 4.3 MMOL/L — SIGNIFICANT CHANGE UP (ref 3.5–5.3)
POTASSIUM SERPL-SCNC: 4.5 MMOL/L — SIGNIFICANT CHANGE UP (ref 3.5–5.3)
PROT SERPL-MCNC: 7.2 G/DL — SIGNIFICANT CHANGE UP (ref 6–8.3)
PROT SERPL-MCNC: 7.5 G/DL — SIGNIFICANT CHANGE UP (ref 6–8.3)
PROT SERPL-MCNC: 7.7 G/DL — SIGNIFICANT CHANGE UP (ref 6–8.3)
PROT UR-MCNC: 30 MG/DL
PROTHROM AB SERPL-ACNC: 68.4 SEC — HIGH (ref 9.8–12.7)
PROTHROM AB SERPL-ACNC: 70.3 SEC — HIGH (ref 9.8–12.7)
PROTHROM AB SERPL-ACNC: 84 SEC — HIGH (ref 9.8–12.7)
RAPID RVP RESULT: SIGNIFICANT CHANGE UP
RBC # BLD: 3.28 M/UL — LOW (ref 4.2–5.8)
RBC # BLD: 3.44 M/UL — LOW (ref 4.2–5.8)
RBC # BLD: 3.82 M/UL — LOW (ref 4.2–5.8)
RBC # FLD: 18.2 % — HIGH (ref 10.3–14.5)
RBC # FLD: 18.2 % — HIGH (ref 10.3–14.5)
RBC # FLD: 18.3 % — HIGH (ref 10.3–14.5)
RBC CASTS # UR COMP ASSIST: SIGNIFICANT CHANGE UP /HPF (ref 0–2)
RH IG SCN BLD-IMP: POSITIVE — SIGNIFICANT CHANGE UP
SALICYLATES SERPL-MCNC: <2 MG/DL — LOW (ref 15–30)
SODIUM SERPL-SCNC: 138 MMOL/L — SIGNIFICANT CHANGE UP (ref 135–145)
SODIUM SERPL-SCNC: 139 MMOL/L — SIGNIFICANT CHANGE UP (ref 135–145)
SODIUM SERPL-SCNC: 150 MMOL/L — HIGH (ref 135–145)
SP GR SPEC: 1.01 — SIGNIFICANT CHANGE UP (ref 1.01–1.02)
TROPONIN T SERPL-MCNC: 0.03 NG/ML — SIGNIFICANT CHANGE UP (ref 0–0.06)
TROPONIN T SERPL-MCNC: 0.04 NG/ML — SIGNIFICANT CHANGE UP (ref 0–0.06)
TROPONIN T SERPL-MCNC: 0.04 NG/ML — SIGNIFICANT CHANGE UP (ref 0–0.06)
UROBILINOGEN FLD QL: 1
WBC # BLD: 7 K/UL — SIGNIFICANT CHANGE UP (ref 3.8–10.5)
WBC # BLD: 7.2 K/UL — SIGNIFICANT CHANGE UP (ref 3.8–10.5)
WBC # BLD: 8.2 K/UL — SIGNIFICANT CHANGE UP (ref 3.8–10.5)
WBC # FLD AUTO: 7 K/UL — SIGNIFICANT CHANGE UP (ref 3.8–10.5)
WBC # FLD AUTO: 7.2 K/UL — SIGNIFICANT CHANGE UP (ref 3.8–10.5)
WBC # FLD AUTO: 8.2 K/UL — SIGNIFICANT CHANGE UP (ref 3.8–10.5)
WBC UR QL: SIGNIFICANT CHANGE UP /HPF (ref 0–5)

## 2017-05-10 PROCEDURE — 71010: CPT | Mod: 26

## 2017-05-10 PROCEDURE — 93010 ELECTROCARDIOGRAM REPORT: CPT

## 2017-05-10 PROCEDURE — 99291 CRITICAL CARE FIRST HOUR: CPT | Mod: 25

## 2017-05-10 PROCEDURE — 99223 1ST HOSP IP/OBS HIGH 75: CPT

## 2017-05-10 PROCEDURE — 93308 TTE F-UP OR LMTD: CPT | Mod: 26

## 2017-05-10 PROCEDURE — 93289 INTERROG DEVICE EVAL HEART: CPT | Mod: 26

## 2017-05-10 RX ORDER — FUROSEMIDE 40 MG
40 TABLET ORAL
Qty: 0 | Refills: 0 | Status: COMPLETED | OUTPATIENT
Start: 2017-05-10 | End: 2017-05-10

## 2017-05-10 RX ORDER — SODIUM CHLORIDE 9 MG/ML
3 INJECTION INTRAMUSCULAR; INTRAVENOUS; SUBCUTANEOUS ONCE
Qty: 0 | Refills: 0 | Status: COMPLETED | OUTPATIENT
Start: 2017-05-10 | End: 2017-05-10

## 2017-05-10 RX ORDER — FUROSEMIDE 40 MG
80 TABLET ORAL ONCE
Qty: 0 | Refills: 0 | Status: DISCONTINUED | OUTPATIENT
Start: 2017-05-10 | End: 2017-05-10

## 2017-05-10 RX ORDER — ERTAPENEM SODIUM 1 G/1
1000 INJECTION, POWDER, LYOPHILIZED, FOR SOLUTION INTRAMUSCULAR; INTRAVENOUS EVERY 24 HOURS
Qty: 0 | Refills: 0 | Status: DISCONTINUED | OUTPATIENT
Start: 2017-05-10 | End: 2017-05-11

## 2017-05-10 RX ORDER — VANCOMYCIN HCL 1 G
1000 VIAL (EA) INTRAVENOUS ONCE
Qty: 0 | Refills: 0 | Status: COMPLETED | OUTPATIENT
Start: 2017-05-10 | End: 2017-05-10

## 2017-05-10 RX ORDER — MEXILETINE HYDROCHLORIDE 150 MG/1
150 CAPSULE ORAL THREE TIMES A DAY
Qty: 0 | Refills: 0 | Status: DISCONTINUED | OUTPATIENT
Start: 2017-05-10 | End: 2017-05-19

## 2017-05-10 RX ORDER — PHYTONADIONE (VIT K1) 5 MG
5 TABLET ORAL ONCE
Qty: 0 | Refills: 0 | Status: COMPLETED | OUTPATIENT
Start: 2017-05-10 | End: 2017-05-10

## 2017-05-10 RX ADMIN — ERTAPENEM SODIUM 120 MILLIGRAM(S): 1 INJECTION, POWDER, LYOPHILIZED, FOR SOLUTION INTRAMUSCULAR; INTRAVENOUS at 17:39

## 2017-05-10 RX ADMIN — Medication 40 MILLIGRAM(S): at 17:38

## 2017-05-10 RX ADMIN — Medication 5 MILLIGRAM(S): at 16:08

## 2017-05-10 RX ADMIN — Medication 40 MILLIGRAM(S): at 17:56

## 2017-05-10 RX ADMIN — MEXILETINE HYDROCHLORIDE 150 MILLIGRAM(S): 150 CAPSULE ORAL at 22:23

## 2017-05-10 RX ADMIN — SODIUM CHLORIDE 3 MILLILITER(S): 9 INJECTION INTRAMUSCULAR; INTRAVENOUS; SUBCUTANEOUS at 15:04

## 2017-05-10 RX ADMIN — Medication 250 MILLIGRAM(S): at 18:48

## 2017-05-10 NOTE — H&P ADULT. - RS GEN PE MLT RESP DETAILS PC
b/l mild crackles in bases, non-labored, no accessory muscle use on supplemental O2/breath sounds equal/good air movement

## 2017-05-10 NOTE — ED ADULT NURSE REASSESSMENT NOTE - NS ED NURSE REASSESS COMMENT FT1
pt started on bear hugger. Rectal temp 96.6. Pt resting comfortably in bed. will continue to reassess.

## 2017-05-10 NOTE — H&P ADULT. - PROBLEM SELECTOR PLAN 5
Pt meets SIRS criteria w/ elevated procalcitonin w.o clear source  - UA & RVP neg, no lobar consolidation or sx of pna  - SIRS 2/2 AdHF? pt received vanc & ertapenem in ED. Reassess tomorrow to determine clinically if there is still concern for infectious etiology

## 2017-05-10 NOTE — H&P ADULT. - PROBLEM SELECTOR PLAN 4
Transaminitis w/ elevated Tbili likely from ischemia 2/2 HF  - monitor w/ CMP  - given elevated INR, possible for cardiac cirrhosis- consider RUQ in am

## 2017-05-10 NOTE — H&P ADULT. - PROBLEM SELECTOR PLAN 6
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 will consider RUQ US for liver assessment

## 2017-05-10 NOTE — ED PROVIDER NOTE - PMH
CHF (Congestive Heart Failure)    HTN    Non-Ischemic Cardiomyopathy    PAF (paroxysmal atrial fibrillation)  on xarelto  SVT (Supraventricular Tachycardia)    Ventricular fibrillation  s/p AICD

## 2017-05-10 NOTE — H&P ADULT. - PROBLEM SELECTOR PLAN 7
pAfib s/p ablation hx on xarelto  - monitor w/ tele in ccu, monitor off meds given HF w/ relative low BP  - when appropriate restart b-blocker and ac

## 2017-05-10 NOTE — H&P ADULT. - PROBLEM SELECTOR PLAN 2
NICM w/ BiV HFrEF (~20%) likely exacerbated by VT event  - strict I/O; may require ionotrope assisted diuresis if BP cannot tolerate additional loop diuretic  - HF consult in am  - hold spironolactone & metoprolol and would not start ace-i given NORMAN

## 2017-05-10 NOTE — H&P ADULT. - PROBLEM SELECTOR PLAN 1
Pt likely had episode of VT w/ ICD defibrillation  - EP interrogated device, likely VT w/ fellow/attending note pending  - CCU management w/ Tele, c/w mexilitine 150mg TID, consider amiodarone if persistent episodes of VT given likelihood of scarring from chronic disease

## 2017-05-10 NOTE — ED PROCEDURE NOTE - DETAILS:
ATTENDING MD:  I, Jacob Sims, was available in the Emergency Department throughout the entire procedure and I was present during the key portions. I agree with the procedure as documented.

## 2017-05-10 NOTE — ED PROCEDURE NOTE - PROCEDURE ADDITIONAL DETAILS
US guided IV placement.  2 unsuccessful attempts by resident, followed by successful placement of 20 guage IV in rigth bascilic vein by Dr. Sims.  Dr. Sánchez available for assistance.  - Sarah Ayala DO

## 2017-05-10 NOTE — ED ADULT NURSE REASSESSMENT NOTE - NS ED NURSE REASSESS COMMENT FT1
unable to obtain iv access. MD gonzales at bedside to place ultrasound IV- will continue to reassess.

## 2017-05-10 NOTE — ED PROVIDER NOTE - ATTENDING CONTRIBUTION TO CARE
ATTENDING MD:  I, Jacob Sims, personally have seen and examined this patient.  I have discussed all aspects of care with the resident physician. Resident note reviewed and agree on plan of care and except where noted.  See HPI, PE, and MDM for details.     EXAM: nontoxic chronically ill appearing, cachectic. NCAT, MMM, +JVD, Lungs with basilar rales L>R, regular rate, irregular rtyhm, 3/6 blowing murmur, 1+ b/l LE edema symmetric b/l, Abd soft, notnender, no CVAT, skin cool and calammy distally with poor turgor, delayed cap refill, normal mentation, moving all extremities    MDM: pt with LOBATO, likely fluid overload, in multiorgan failure with elevated lactate. pt in compensated shock, likely cardiogenic in nature, but with elevated procalcitonin and high lactate we will cutlure and give ABx. pt to be admitted to CCU

## 2017-05-10 NOTE — ED PROVIDER NOTE - CARE PLAN
Principal Discharge DX:	CHF (congestive heart failure)  Secondary Diagnosis:	NORMAN (acute kidney injury)  Secondary Diagnosis:	Transaminitis Principal Discharge DX:	Cardiogenic shock  Secondary Diagnosis:	NORMAN (acute kidney injury)  Secondary Diagnosis:	Transaminitis

## 2017-05-10 NOTE — ED ADULT NURSE NOTE - OBJECTIVE STATEMENT
67y male brought in by EMS c/o light headedness and dizziness. PT has an AICD, was called this am for a cardiac event that happened last night. Pt is unaware of what the event was. Pt felt light headed and dizzy this am and called ems. Pt is alert and oriented x 3 and speaking coherently. pt is c/o sob, pt 10L on non rebreather O2 at 100%. EKG completed, PT attached to tele. PA at bedside. Will continue to reassess.

## 2017-05-10 NOTE — ED PROVIDER NOTE - PHYSICAL EXAMINATION
Gen:  AOx3, with NRB  Head: NCAT  HEENT: PERRL, oral mucosa moist, normal conjunctiva  Lung: CTAB, no rales, wheezing or ronchi  CV: rrr, blowing murmur, Normal perfusion  Abd: soft, NTND, no CVA tenderness  MSK:  no visible deformities, 1+ pitting edema of bilateral lower extremities  Neuro: No focal neurologic deficits  Skin: No rash   Psych: normal affect   - Sarah Ayala, DO Gen:  AOx3, with NRB  Head: NCAT  HEENT: PERRL, oral mucosa moist, normal conjunctiva  Lung: CTAB, basilar rales, no wheezing or ronchi  CV: rrr, blowing murmur, Normal perfusion  Abd: soft, NTND, no CVA tenderness  MSK:  no visible deformities, 1+ pitting edema of bilateral lower extremities  Neuro: No focal neurologic deficits  Skin: No rash   Psych: normal affect   - Sarah Ayala, DO

## 2017-05-10 NOTE — H&P ADULT. - PSH
AICD (Automatic Cardioverter/Defibrillator) Present  [employer] with 1 [employer] lead4/1/09- explanted and replaced with [employer] 2 leads on 9/2/09  History of Prior Ablation Treatment  for afib  Status post left hip replacement

## 2017-05-10 NOTE — ED PROVIDER NOTE - OBJECTIVE STATEMENT
67 M pmh CHF, nonischemic cardiomyopathy s/p AICD, Afib on xarelto, recently discharged 4 days ago for shortness of breath presents with dizziness and shortness of breath stating he received a call today that his AICD fired yesterday.  Pt recalls "dropping something" yesterday he was holding in his hand and maybe "passing out of a second" but does not recall the AICD firing.  Pt states he has felt dizzy and shortness of breath since yesterday.  Once he received the phone call today, he called EMS and came to ED.  Pt denies chest pain, abd pain, n/v/d, fever, chills, dysuria.  no sick contacts.    PMD: Dr. Noreen Ayala, DO 67 M pmh CHF, nonischemic cardiomyopathy s/p AICD, Afib on xarelto, SVT, s/p ablations, recently discharged 4 days ago for shortness of breath presents with dizziness and shortness of breath stating he received a call today that his AICD fired yesterday.  Pt recalls "dropping something" yesterday he was holding in his hand and maybe "passing out of a second" but does not recall the AICD firing.  Pt states he has felt dizzy and shortness of breath since yesterday.  Once he received the phone call today, he called EMS and came to ED.  Pt denies chest pain, abd pain, n/v/d, fever, chills, dysuria.  no sick contacts.    PMD: Dr. Checo Ayala, DO

## 2017-05-10 NOTE — H&P ADULT. - ASSESSMENT
67M w/ NICM w/ BiVHFrEF(EF20%) s/p AICD([employer]) w/ recent admission for AdHF (d/c 5/5 from [facility]), severe MR & severe pulm HTN on TTE(4/29/17), & paroxysmal afib s/p ablation on xarelto sent to [facility] by EP due to ICD shock on remote monitoring and admitted to CCU for management of Acute on Chronic HFrEF likely 2/2 VT s/p ICD shock c/b NORMAN on CKD stage II, transaminitis HF, SIRS w/ procalcitonin elevated,  and supratherapeutic INR w/ xarelto use.

## 2017-05-10 NOTE — H&P ADULT. - HISTORY OF PRESENT ILLNESS
67M w/ NICM w/ BiVHFrEF(EF20%) s/p AICD([employer]) w/ recent admission for AdHF (d/c 5/5 from [facility]), severe MR & severe pulm HTN on TTE(4/29/17), & chronic afib s/p ablation on xarelto sent to [facility] by EP due to ICD shock on remote monitoring for which the patient did not feel. The pt cannot specifically correlate the timing of ICD firing w/ a particular change in health however has had dizziness over the last few days and noticed progressive SOB x2d at rest and comments that it is the most severe his SOB has ever been. He does not report fainting, CP, palpitations, cough, n/v/d, dysuria, fevers, or chills. The pt does report having b/l LE swelling at times as baseline. Furthermore, the pt reports coming to the hospital because he was told to by EP due to ICD firing.    In the ED  VS 35.8C(96.5), 105/80, 112, 17 on NC3L 100%  8.2>11.3/35.6<347  139/4.5/99/20/40/1.74<122  AG20; Lactate 6.0; baseline Cr1.1 (CKDII)  Tbili 2.0; AST/ALT: 79/63  Procalcitonin: 0.34  ProBNP: 7218  CE Trop 0.03->0.04->0.04  ABG:pH7.47,pCO2 24, pO2 102  UA: Nitrite/LeukEst Neg  RVP: Neg    CXR(5/10): The heart is enlarged. The lungs are clear. A pacer is in good position. No pneumothorax.  TTE (4/29/17) EF23% Severe MR; Severe global systolic dysfuction. Right ventricular enlargement w/ decreased RV systolic dysfunction. Moderate TR. Severe Pulm HTN  RHC (4/12/17): RA9 RV 32/5 PA 41/12 PCWP14    ED gave: Vanc 1g & Ertapenem 1g, Lasix 40mgIV x2, vitamin K 5mg 67M w/ NICM w/ BiVHFrEF(EF20%) s/p AICD([employer]) w/ recent admission for AdHF (d/c 5/5 from [facility]), severe MR & severe pulm HTN on TTE(4/29/17), & paroxysmal afib s/p ablation on xarelto sent to [facility] by EP due to ICD shock on remote monitoring for which the patient did not feel. The pt cannot specifically correlate the timing of ICD firing w/ a particular change in health however has had dizziness over the last few days and noticed progressive SOB x2d at rest and comments that it is the most severe his SOB has ever been. He does not report fainting, CP, palpitations, cough, n/v/d, dysuria, fevers, or chills. The pt does report having b/l LE swelling at times as baseline. Furthermore, the pt reports coming to the hospital because he was told to by EP due to ICD firing.    In the ED  VS 35.8C(96.5), 105/80, 112, 17 on NC3L 100%  8.2>11.3/35.6<347  139/4.5/99/20/40/1.74<122  AG20; Lactate 6.0; baseline Cr1.1 (CKDII)  Tbili 2.0; AST/ALT: 79/63  Procalcitonin: 0.34  ProBNP: 7218  CE Trop 0.03->0.04->0.04  ABG:pH7.47,pCO2 24, pO2 102  UA: Nitrite/LeukEst Neg  RVP: Neg    CXR(5/10): The heart is enlarged. The lungs are clear. A pacer is in good position. No pneumothorax.  TTE (4/29/17) EF23% Severe MR; Severe global systolic dysfuction. Right ventricular enlargement w/ decreased RV systolic dysfunction. Moderate TR. Severe Pulm HTN  RHC (4/12/17): RA9 RV 32/5 PA 41/12 PCWP14    ED gave: Vanc 1g & Ertapenem 1g, Lasix 40mgIV x2, vitamin K 5mg

## 2017-05-10 NOTE — ED ADULT NURSE REASSESSMENT NOTE - NS ED NURSE REASSESS COMMENT FT1
Recvd pt awake, alert and responsive to all stimuli.  no sob or respiratory distress noted at this time; 3L via n/c in place. vss.  pt denies any pain and is resting in bed comfortably with bear hugger in place; T=97.3 orally.  pt recvd isha from AM RN and is urinating x 1 now.  pt assigned CCU bed for continuum of care.  will continue to monitor until such time.

## 2017-05-10 NOTE — ED PROVIDER NOTE - CRITICAL CARE PROVIDED
additional history taking/direct patient care (not related to procedure)/consultation with other physicians/documentation/interpretation of diagnostic studies

## 2017-05-10 NOTE — PATIENT PROFILE ADULT. - NS TRANSFER PATIENT BELONGINGS
Jewelry/Money (specify)/Clothing/Cell Phone/PDA (specify)/1 ring,7dollers Jewelry/Money (specify)/1 ring,70dollars/Cell Phone/PDA (specify)/Clothing

## 2017-05-11 LAB
ALBUMIN SERPL ELPH-MCNC: 3.2 G/DL — LOW (ref 3.3–5)
ALBUMIN SERPL ELPH-MCNC: 3.3 G/DL — SIGNIFICANT CHANGE UP (ref 3.3–5)
ALBUMIN SERPL ELPH-MCNC: 3.3 G/DL — SIGNIFICANT CHANGE UP (ref 3.3–5)
ALP SERPL-CCNC: 86 U/L — SIGNIFICANT CHANGE UP (ref 40–120)
ALP SERPL-CCNC: 87 U/L — SIGNIFICANT CHANGE UP (ref 40–120)
ALP SERPL-CCNC: 91 U/L — SIGNIFICANT CHANGE UP (ref 40–120)
ALT FLD-CCNC: 69 U/L RC — HIGH (ref 10–45)
ALT FLD-CCNC: 84 U/L RC — HIGH (ref 10–45)
ALT FLD-CCNC: 86 U/L RC — HIGH (ref 10–45)
ANION GAP SERPL CALC-SCNC: 17 MMOL/L — SIGNIFICANT CHANGE UP (ref 5–17)
APTT BLD: 39.4 SEC — HIGH (ref 27.5–37.4)
AST SERPL-CCNC: 116 U/L — HIGH (ref 10–40)
AST SERPL-CCNC: 143 U/L — HIGH (ref 10–40)
AST SERPL-CCNC: 93 U/L — HIGH (ref 10–40)
BILIRUB SERPL-MCNC: 1.5 MG/DL — HIGH (ref 0.2–1.2)
BILIRUB SERPL-MCNC: 1.6 MG/DL — HIGH (ref 0.2–1.2)
BILIRUB SERPL-MCNC: 1.7 MG/DL — HIGH (ref 0.2–1.2)
BUN SERPL-MCNC: 43 MG/DL — HIGH (ref 7–23)
BUN SERPL-MCNC: 44 MG/DL — HIGH (ref 7–23)
BUN SERPL-MCNC: 46 MG/DL — HIGH (ref 7–23)
CALCIUM SERPL-MCNC: 8.8 MG/DL — SIGNIFICANT CHANGE UP (ref 8.4–10.5)
CALCIUM SERPL-MCNC: 9.1 MG/DL — SIGNIFICANT CHANGE UP (ref 8.4–10.5)
CALCIUM SERPL-MCNC: 9.4 MG/DL — SIGNIFICANT CHANGE UP (ref 8.4–10.5)
CHLORIDE SERPL-SCNC: 100 MMOL/L — SIGNIFICANT CHANGE UP (ref 96–108)
CHLORIDE SERPL-SCNC: 100 MMOL/L — SIGNIFICANT CHANGE UP (ref 96–108)
CHLORIDE SERPL-SCNC: 98 MMOL/L — SIGNIFICANT CHANGE UP (ref 96–108)
CO2 SERPL-SCNC: 17 MMOL/L — LOW (ref 22–31)
CO2 SERPL-SCNC: 22 MMOL/L — SIGNIFICANT CHANGE UP (ref 22–31)
CO2 SERPL-SCNC: 24 MMOL/L — SIGNIFICANT CHANGE UP (ref 22–31)
CREAT SERPL-MCNC: 1.67 MG/DL — HIGH (ref 0.5–1.3)
CREAT SERPL-MCNC: 1.73 MG/DL — HIGH (ref 0.5–1.3)
CREAT SERPL-MCNC: 1.79 MG/DL — HIGH (ref 0.5–1.3)
CULTURE RESULTS: NO GROWTH — SIGNIFICANT CHANGE UP
GLUCOSE SERPL-MCNC: 87 MG/DL — SIGNIFICANT CHANGE UP (ref 70–99)
GLUCOSE SERPL-MCNC: 89 MG/DL — SIGNIFICANT CHANGE UP (ref 70–99)
GLUCOSE SERPL-MCNC: 91 MG/DL — SIGNIFICANT CHANGE UP (ref 70–99)
HBA1C BLD-MCNC: 5.5 % — SIGNIFICANT CHANGE UP (ref 4–5.6)
HCT VFR BLD CALC: 28.7 % — LOW (ref 39–50)
HGB BLD-MCNC: 9.4 G/DL — LOW (ref 13–17)
INR BLD: 5.3 RATIO — CRITICAL HIGH (ref 0.88–1.16)
MAGNESIUM SERPL-MCNC: 2.1 MG/DL — SIGNIFICANT CHANGE UP (ref 1.6–2.6)
MAGNESIUM SERPL-MCNC: 2.1 MG/DL — SIGNIFICANT CHANGE UP (ref 1.6–2.6)
MAGNESIUM SERPL-MCNC: 2.3 MG/DL — SIGNIFICANT CHANGE UP (ref 1.6–2.6)
MCHC RBC-ENTMCNC: 30 PG — SIGNIFICANT CHANGE UP (ref 27–34)
MCHC RBC-ENTMCNC: 32.6 GM/DL — SIGNIFICANT CHANGE UP (ref 32–36)
MCV RBC AUTO: 91.9 FL — SIGNIFICANT CHANGE UP (ref 80–100)
PHOSPHATE SERPL-MCNC: 4.1 MG/DL — SIGNIFICANT CHANGE UP (ref 2.5–4.5)
PHOSPHATE SERPL-MCNC: 4.8 MG/DL — HIGH (ref 2.5–4.5)
PHOSPHATE SERPL-MCNC: 4.9 MG/DL — HIGH (ref 2.5–4.5)
PLATELET # BLD AUTO: 300 K/UL — SIGNIFICANT CHANGE UP (ref 150–400)
POTASSIUM SERPL-MCNC: 3.7 MMOL/L — SIGNIFICANT CHANGE UP (ref 3.5–5.3)
POTASSIUM SERPL-MCNC: 4.5 MMOL/L — SIGNIFICANT CHANGE UP (ref 3.5–5.3)
POTASSIUM SERPL-MCNC: 5.4 MMOL/L — HIGH (ref 3.5–5.3)
POTASSIUM SERPL-SCNC: 3.7 MMOL/L — SIGNIFICANT CHANGE UP (ref 3.5–5.3)
POTASSIUM SERPL-SCNC: 4.5 MMOL/L — SIGNIFICANT CHANGE UP (ref 3.5–5.3)
POTASSIUM SERPL-SCNC: 5.4 MMOL/L — HIGH (ref 3.5–5.3)
PROT SERPL-MCNC: 6.8 G/DL — SIGNIFICANT CHANGE UP (ref 6–8.3)
PROT SERPL-MCNC: 6.9 G/DL — SIGNIFICANT CHANGE UP (ref 6–8.3)
PROT SERPL-MCNC: 7 G/DL — SIGNIFICANT CHANGE UP (ref 6–8.3)
PROTHROM AB SERPL-ACNC: 59.8 SEC — HIGH (ref 9.8–12.7)
RBC # BLD: 3.12 M/UL — LOW (ref 4.2–5.8)
RBC # FLD: 18.2 % — HIGH (ref 10.3–14.5)
SODIUM SERPL-SCNC: 134 MMOL/L — LOW (ref 135–145)
SODIUM SERPL-SCNC: 139 MMOL/L — SIGNIFICANT CHANGE UP (ref 135–145)
SODIUM SERPL-SCNC: 139 MMOL/L — SIGNIFICANT CHANGE UP (ref 135–145)
SPECIMEN SOURCE: SIGNIFICANT CHANGE UP
TSH SERPL-MCNC: 2.6 UIU/ML — SIGNIFICANT CHANGE UP (ref 0.27–4.2)
WBC # BLD: 7.4 K/UL — SIGNIFICANT CHANGE UP (ref 3.8–10.5)
WBC # FLD AUTO: 7.4 K/UL — SIGNIFICANT CHANGE UP (ref 3.8–10.5)

## 2017-05-11 PROCEDURE — 99233 SBSQ HOSP IP/OBS HIGH 50: CPT

## 2017-05-11 PROCEDURE — 99223 1ST HOSP IP/OBS HIGH 75: CPT | Mod: GC

## 2017-05-11 PROCEDURE — 76937 US GUIDE VASCULAR ACCESS: CPT | Mod: 26

## 2017-05-11 PROCEDURE — 93010 ELECTROCARDIOGRAM REPORT: CPT

## 2017-05-11 PROCEDURE — 93306 TTE W/DOPPLER COMPLETE: CPT | Mod: 26

## 2017-05-11 PROCEDURE — 99291 CRITICAL CARE FIRST HOUR: CPT

## 2017-05-11 RX ORDER — FUROSEMIDE 40 MG
5 TABLET ORAL
Qty: 500 | Refills: 0 | Status: DISCONTINUED | OUTPATIENT
Start: 2017-05-11 | End: 2017-05-14

## 2017-05-11 RX ORDER — DOCUSATE SODIUM 100 MG
100 CAPSULE ORAL THREE TIMES A DAY
Qty: 0 | Refills: 0 | Status: DISCONTINUED | OUTPATIENT
Start: 2017-05-11 | End: 2017-05-16

## 2017-05-11 RX ORDER — PANTOPRAZOLE SODIUM 20 MG/1
40 TABLET, DELAYED RELEASE ORAL
Qty: 0 | Refills: 0 | Status: DISCONTINUED | OUTPATIENT
Start: 2017-05-11 | End: 2017-05-19

## 2017-05-11 RX ORDER — MILRINONE LACTATE 1 MG/ML
0.3 INJECTION, SOLUTION INTRAVENOUS
Qty: 20 | Refills: 0 | Status: DISCONTINUED | OUTPATIENT
Start: 2017-05-11 | End: 2017-05-19

## 2017-05-11 RX ORDER — POTASSIUM CHLORIDE 20 MEQ
40 PACKET (EA) ORAL ONCE
Qty: 0 | Refills: 0 | Status: COMPLETED | OUTPATIENT
Start: 2017-05-11 | End: 2017-05-11

## 2017-05-11 RX ORDER — SENNA PLUS 8.6 MG/1
2 TABLET ORAL AT BEDTIME
Qty: 0 | Refills: 0 | Status: DISCONTINUED | OUTPATIENT
Start: 2017-05-11 | End: 2017-05-16

## 2017-05-11 RX ADMIN — Medication 40 MILLIEQUIVALENT(S): at 23:17

## 2017-05-11 RX ADMIN — SENNA PLUS 2 TABLET(S): 8.6 TABLET ORAL at 23:17

## 2017-05-11 RX ADMIN — PANTOPRAZOLE SODIUM 40 MILLIGRAM(S): 20 TABLET, DELAYED RELEASE ORAL at 08:43

## 2017-05-11 RX ADMIN — Medication 2.5 MG/HR: at 15:20

## 2017-05-11 RX ADMIN — MEXILETINE HYDROCHLORIDE 150 MILLIGRAM(S): 150 CAPSULE ORAL at 05:02

## 2017-05-11 RX ADMIN — MEXILETINE HYDROCHLORIDE 150 MILLIGRAM(S): 150 CAPSULE ORAL at 21:20

## 2017-05-11 RX ADMIN — MILRINONE LACTATE 6.77 MICROGRAM(S)/KG/MIN: 1 INJECTION, SOLUTION INTRAVENOUS at 14:56

## 2017-05-11 RX ADMIN — MEXILETINE HYDROCHLORIDE 150 MILLIGRAM(S): 150 CAPSULE ORAL at 13:50

## 2017-05-11 RX ADMIN — Medication 100 MILLIGRAM(S): at 23:17

## 2017-05-11 NOTE — PROVIDER CONTACT NOTE (OTHER) - ASSESSMENT
l/s fine crackles at bases, same as initial assessment. o2 sat 2L NC 98%, increased O2 to 4LNC, tolerating well. sat pt up in bed. obtained 12 lead ekg

## 2017-05-12 LAB
ALBUMIN SERPL ELPH-MCNC: 3.2 G/DL — LOW (ref 3.3–5)
ALBUMIN SERPL ELPH-MCNC: 3.3 G/DL — SIGNIFICANT CHANGE UP (ref 3.3–5)
ALBUMIN SERPL ELPH-MCNC: 3.5 G/DL — SIGNIFICANT CHANGE UP (ref 3.3–5)
ALP SERPL-CCNC: 103 U/L — SIGNIFICANT CHANGE UP (ref 40–120)
ALP SERPL-CCNC: 111 U/L — SIGNIFICANT CHANGE UP (ref 40–120)
ALP SERPL-CCNC: 87 U/L — SIGNIFICANT CHANGE UP (ref 40–120)
ALT FLD-CCNC: 74 U/L RC — HIGH (ref 10–45)
ALT FLD-CCNC: 85 U/L RC — HIGH (ref 10–45)
ALT FLD-CCNC: 86 U/L RC — HIGH (ref 10–45)
ANION GAP SERPL CALC-SCNC: 15 MMOL/L — SIGNIFICANT CHANGE UP (ref 5–17)
ANION GAP SERPL CALC-SCNC: 16 MMOL/L — SIGNIFICANT CHANGE UP (ref 5–17)
APTT BLD: 35.9 SEC — SIGNIFICANT CHANGE UP (ref 27.5–37.4)
AST SERPL-CCNC: 101 U/L — HIGH (ref 10–40)
AST SERPL-CCNC: 108 U/L — HIGH (ref 10–40)
AST SERPL-CCNC: 114 U/L — HIGH (ref 10–40)
BILIRUB SERPL-MCNC: 1.4 MG/DL — HIGH (ref 0.2–1.2)
BILIRUB SERPL-MCNC: 1.5 MG/DL — HIGH (ref 0.2–1.2)
BILIRUB SERPL-MCNC: 1.7 MG/DL — HIGH (ref 0.2–1.2)
BUN SERPL-MCNC: 31 MG/DL — HIGH (ref 7–23)
BUN SERPL-MCNC: 36 MG/DL — HIGH (ref 7–23)
BUN SERPL-MCNC: 40 MG/DL — HIGH (ref 7–23)
CALCIUM SERPL-MCNC: 8.6 MG/DL — SIGNIFICANT CHANGE UP (ref 8.4–10.5)
CALCIUM SERPL-MCNC: 8.9 MG/DL — SIGNIFICANT CHANGE UP (ref 8.4–10.5)
CALCIUM SERPL-MCNC: 8.9 MG/DL — SIGNIFICANT CHANGE UP (ref 8.4–10.5)
CHLORIDE SERPL-SCNC: 100 MMOL/L — SIGNIFICANT CHANGE UP (ref 96–108)
CHLORIDE SERPL-SCNC: 102 MMOL/L — SIGNIFICANT CHANGE UP (ref 96–108)
CHLORIDE SERPL-SCNC: 102 MMOL/L — SIGNIFICANT CHANGE UP (ref 96–108)
CO2 SERPL-SCNC: 20 MMOL/L — LOW (ref 22–31)
CO2 SERPL-SCNC: 21 MMOL/L — LOW (ref 22–31)
CO2 SERPL-SCNC: 21 MMOL/L — LOW (ref 22–31)
CREAT SERPL-MCNC: 1.26 MG/DL — SIGNIFICANT CHANGE UP (ref 0.5–1.3)
CREAT SERPL-MCNC: 1.38 MG/DL — HIGH (ref 0.5–1.3)
CREAT SERPL-MCNC: 1.48 MG/DL — HIGH (ref 0.5–1.3)
GLUCOSE SERPL-MCNC: 108 MG/DL — HIGH (ref 70–99)
GLUCOSE SERPL-MCNC: 95 MG/DL — SIGNIFICANT CHANGE UP (ref 70–99)
GLUCOSE SERPL-MCNC: 96 MG/DL — SIGNIFICANT CHANGE UP (ref 70–99)
HCT VFR BLD CALC: 28.2 % — LOW (ref 39–50)
HGB BLD-MCNC: 9.2 G/DL — LOW (ref 13–17)
MAGNESIUM SERPL-MCNC: 2.2 MG/DL — SIGNIFICANT CHANGE UP (ref 1.6–2.6)
MAGNESIUM SERPL-MCNC: 2.3 MG/DL — SIGNIFICANT CHANGE UP (ref 1.6–2.6)
MCHC RBC-ENTMCNC: 30.1 PG — SIGNIFICANT CHANGE UP (ref 27–34)
MCHC RBC-ENTMCNC: 32.5 GM/DL — SIGNIFICANT CHANGE UP (ref 32–36)
MCV RBC AUTO: 92.6 FL — SIGNIFICANT CHANGE UP (ref 80–100)
PHOSPHATE SERPL-MCNC: 3 MG/DL — SIGNIFICANT CHANGE UP (ref 2.5–4.5)
PHOSPHATE SERPL-MCNC: 3.5 MG/DL — SIGNIFICANT CHANGE UP (ref 2.5–4.5)
PLATELET # BLD AUTO: 260 K/UL — SIGNIFICANT CHANGE UP (ref 150–400)
POTASSIUM SERPL-MCNC: 3.4 MMOL/L — LOW (ref 3.5–5.3)
POTASSIUM SERPL-MCNC: 4.1 MMOL/L — SIGNIFICANT CHANGE UP (ref 3.5–5.3)
POTASSIUM SERPL-MCNC: 4.3 MMOL/L — SIGNIFICANT CHANGE UP (ref 3.5–5.3)
POTASSIUM SERPL-SCNC: 3.4 MMOL/L — LOW (ref 3.5–5.3)
POTASSIUM SERPL-SCNC: 4.1 MMOL/L — SIGNIFICANT CHANGE UP (ref 3.5–5.3)
POTASSIUM SERPL-SCNC: 4.3 MMOL/L — SIGNIFICANT CHANGE UP (ref 3.5–5.3)
PROT SERPL-MCNC: 6.4 G/DL — SIGNIFICANT CHANGE UP (ref 6–8.3)
PROT SERPL-MCNC: 7.1 G/DL — SIGNIFICANT CHANGE UP (ref 6–8.3)
PROT SERPL-MCNC: 7.3 G/DL — SIGNIFICANT CHANGE UP (ref 6–8.3)
RBC # BLD: 3.04 M/UL — LOW (ref 4.2–5.8)
RBC # FLD: 18.1 % — HIGH (ref 10.3–14.5)
SODIUM SERPL-SCNC: 136 MMOL/L — SIGNIFICANT CHANGE UP (ref 135–145)
SODIUM SERPL-SCNC: 138 MMOL/L — SIGNIFICANT CHANGE UP (ref 135–145)
SODIUM SERPL-SCNC: 139 MMOL/L — SIGNIFICANT CHANGE UP (ref 135–145)
WBC # BLD: 6.4 K/UL — SIGNIFICANT CHANGE UP (ref 3.8–10.5)
WBC # FLD AUTO: 6.4 K/UL — SIGNIFICANT CHANGE UP (ref 3.8–10.5)

## 2017-05-12 PROCEDURE — 99233 SBSQ HOSP IP/OBS HIGH 50: CPT | Mod: GC

## 2017-05-12 PROCEDURE — 90785 PSYTX COMPLEX INTERACTIVE: CPT

## 2017-05-12 PROCEDURE — 93010 ELECTROCARDIOGRAM REPORT: CPT

## 2017-05-12 PROCEDURE — 90791 PSYCH DIAGNOSTIC EVALUATION: CPT

## 2017-05-12 PROCEDURE — 96116 NUBHVL XM PHYS/QHP 1ST HR: CPT

## 2017-05-12 PROCEDURE — 99233 SBSQ HOSP IP/OBS HIGH 50: CPT

## 2017-05-12 RX ORDER — METOPROLOL TARTRATE 50 MG
12.5 TABLET ORAL DAILY
Qty: 0 | Refills: 0 | Status: DISCONTINUED | OUTPATIENT
Start: 2017-05-12 | End: 2017-05-12

## 2017-05-12 RX ORDER — POTASSIUM CHLORIDE 20 MEQ
40 PACKET (EA) ORAL ONCE
Qty: 0 | Refills: 0 | Status: COMPLETED | OUTPATIENT
Start: 2017-05-12 | End: 2017-05-12

## 2017-05-12 RX ADMIN — PANTOPRAZOLE SODIUM 40 MILLIGRAM(S): 20 TABLET, DELAYED RELEASE ORAL at 05:20

## 2017-05-12 RX ADMIN — Medication 100 MILLIGRAM(S): at 13:48

## 2017-05-12 RX ADMIN — MEXILETINE HYDROCHLORIDE 150 MILLIGRAM(S): 150 CAPSULE ORAL at 21:02

## 2017-05-12 RX ADMIN — MILRINONE LACTATE 6.77 MICROGRAM(S)/KG/MIN: 1 INJECTION, SOLUTION INTRAVENOUS at 05:22

## 2017-05-12 RX ADMIN — MEXILETINE HYDROCHLORIDE 150 MILLIGRAM(S): 150 CAPSULE ORAL at 05:20

## 2017-05-12 RX ADMIN — SENNA PLUS 2 TABLET(S): 8.6 TABLET ORAL at 21:02

## 2017-05-12 RX ADMIN — MILRINONE LACTATE 6.77 MICROGRAM(S)/KG/MIN: 1 INJECTION, SOLUTION INTRAVENOUS at 13:50

## 2017-05-12 RX ADMIN — Medication 2.5 MG/HR: at 13:48

## 2017-05-12 RX ADMIN — Medication 40 MILLIEQUIVALENT(S): at 05:20

## 2017-05-12 RX ADMIN — Medication 100 MILLIGRAM(S): at 21:02

## 2017-05-12 RX ADMIN — Medication 100 MILLIGRAM(S): at 05:20

## 2017-05-12 RX ADMIN — MEXILETINE HYDROCHLORIDE 150 MILLIGRAM(S): 150 CAPSULE ORAL at 13:48

## 2017-05-13 LAB
ALBUMIN SERPL ELPH-MCNC: 3.1 G/DL — LOW (ref 3.3–5)
ALBUMIN SERPL ELPH-MCNC: 3.3 G/DL — SIGNIFICANT CHANGE UP (ref 3.3–5)
ALP SERPL-CCNC: 116 U/L — SIGNIFICANT CHANGE UP (ref 40–120)
ALP SERPL-CCNC: 123 U/L — HIGH (ref 40–120)
ALT FLD-CCNC: 76 U/L RC — HIGH (ref 10–45)
ALT FLD-CCNC: 78 U/L RC — HIGH (ref 10–45)
ANION GAP SERPL CALC-SCNC: 13 MMOL/L — SIGNIFICANT CHANGE UP (ref 5–17)
APTT BLD: 140.2 SEC — CRITICAL HIGH (ref 27.5–37.4)
APTT BLD: 87.2 SEC — HIGH (ref 27.5–37.4)
AST SERPL-CCNC: 89 U/L — HIGH (ref 10–40)
AST SERPL-CCNC: 94 U/L — HIGH (ref 10–40)
BILIRUB SERPL-MCNC: 1.4 MG/DL — HIGH (ref 0.2–1.2)
BILIRUB SERPL-MCNC: 1.4 MG/DL — HIGH (ref 0.2–1.2)
BUN SERPL-MCNC: 21 MG/DL — SIGNIFICANT CHANGE UP (ref 7–23)
BUN SERPL-MCNC: 26 MG/DL — HIGH (ref 7–23)
CALCIUM SERPL-MCNC: 8.6 MG/DL — SIGNIFICANT CHANGE UP (ref 8.4–10.5)
CALCIUM SERPL-MCNC: 8.7 MG/DL — SIGNIFICANT CHANGE UP (ref 8.4–10.5)
CHLORIDE SERPL-SCNC: 98 MMOL/L — SIGNIFICANT CHANGE UP (ref 96–108)
CHLORIDE SERPL-SCNC: 99 MMOL/L — SIGNIFICANT CHANGE UP (ref 96–108)
CO2 SERPL-SCNC: 24 MMOL/L — SIGNIFICANT CHANGE UP (ref 22–31)
CO2 SERPL-SCNC: 24 MMOL/L — SIGNIFICANT CHANGE UP (ref 22–31)
CREAT SERPL-MCNC: 0.96 MG/DL — SIGNIFICANT CHANGE UP (ref 0.5–1.3)
CREAT SERPL-MCNC: 1.02 MG/DL — SIGNIFICANT CHANGE UP (ref 0.5–1.3)
GLUCOSE SERPL-MCNC: 74 MG/DL — SIGNIFICANT CHANGE UP (ref 70–99)
GLUCOSE SERPL-MCNC: 85 MG/DL — SIGNIFICANT CHANGE UP (ref 70–99)
HCT VFR BLD CALC: 33.2 % — LOW (ref 39–50)
HCT VFR BLD CALC: 35.5 % — LOW (ref 39–50)
HGB BLD-MCNC: 10.5 G/DL — LOW (ref 13–17)
HGB BLD-MCNC: 10.7 G/DL — LOW (ref 13–17)
INR BLD: 1.4 RATIO — HIGH (ref 0.88–1.16)
INR BLD: 1.62 RATIO — HIGH (ref 0.88–1.16)
MAGNESIUM SERPL-MCNC: 1.9 MG/DL — SIGNIFICANT CHANGE UP (ref 1.6–2.6)
MAGNESIUM SERPL-MCNC: 2.1 MG/DL — SIGNIFICANT CHANGE UP (ref 1.6–2.6)
MCHC RBC-ENTMCNC: 28.4 PG — SIGNIFICANT CHANGE UP (ref 27–34)
MCHC RBC-ENTMCNC: 29.6 PG — SIGNIFICANT CHANGE UP (ref 27–34)
MCHC RBC-ENTMCNC: 30 GM/DL — LOW (ref 32–36)
MCHC RBC-ENTMCNC: 31.6 GM/DL — LOW (ref 32–36)
MCV RBC AUTO: 93.7 FL — SIGNIFICANT CHANGE UP (ref 80–100)
MCV RBC AUTO: 94.6 FL — SIGNIFICANT CHANGE UP (ref 80–100)
PHOSPHATE SERPL-MCNC: 2.5 MG/DL — SIGNIFICANT CHANGE UP (ref 2.5–4.5)
PHOSPHATE SERPL-MCNC: 2.7 MG/DL — SIGNIFICANT CHANGE UP (ref 2.5–4.5)
PLATELET # BLD AUTO: 284 K/UL — SIGNIFICANT CHANGE UP (ref 150–400)
PLATELET # BLD AUTO: 320 K/UL — SIGNIFICANT CHANGE UP (ref 150–400)
POTASSIUM SERPL-MCNC: 3.8 MMOL/L — SIGNIFICANT CHANGE UP (ref 3.5–5.3)
POTASSIUM SERPL-MCNC: 3.9 MMOL/L — SIGNIFICANT CHANGE UP (ref 3.5–5.3)
POTASSIUM SERPL-SCNC: 3.8 MMOL/L — SIGNIFICANT CHANGE UP (ref 3.5–5.3)
POTASSIUM SERPL-SCNC: 3.9 MMOL/L — SIGNIFICANT CHANGE UP (ref 3.5–5.3)
PROT SERPL-MCNC: 6.8 G/DL — SIGNIFICANT CHANGE UP (ref 6–8.3)
PROT SERPL-MCNC: 7.3 G/DL — SIGNIFICANT CHANGE UP (ref 6–8.3)
PROTHROM AB SERPL-ACNC: 15.2 SEC — HIGH (ref 9.8–12.7)
PROTHROM AB SERPL-ACNC: 17.8 SEC — HIGH (ref 9.8–12.7)
RBC # BLD: 3.54 M/UL — LOW (ref 4.2–5.8)
RBC # BLD: 3.76 M/UL — LOW (ref 4.2–5.8)
RBC # FLD: 18.1 % — HIGH (ref 10.3–14.5)
RBC # FLD: 18.2 % — HIGH (ref 10.3–14.5)
SODIUM SERPL-SCNC: 135 MMOL/L — SIGNIFICANT CHANGE UP (ref 135–145)
SODIUM SERPL-SCNC: 136 MMOL/L — SIGNIFICANT CHANGE UP (ref 135–145)
WBC # BLD: 6.5 K/UL — SIGNIFICANT CHANGE UP (ref 3.8–10.5)
WBC # BLD: 7.9 K/UL — SIGNIFICANT CHANGE UP (ref 3.8–10.5)
WBC # FLD AUTO: 6.5 K/UL — SIGNIFICANT CHANGE UP (ref 3.8–10.5)
WBC # FLD AUTO: 7.9 K/UL — SIGNIFICANT CHANGE UP (ref 3.8–10.5)

## 2017-05-13 PROCEDURE — 99233 SBSQ HOSP IP/OBS HIGH 50: CPT | Mod: GC

## 2017-05-13 PROCEDURE — 93010 ELECTROCARDIOGRAM REPORT: CPT

## 2017-05-13 RX ORDER — MAGNESIUM SULFATE 500 MG/ML
1 VIAL (ML) INJECTION ONCE
Qty: 0 | Refills: 0 | Status: COMPLETED | OUTPATIENT
Start: 2017-05-13 | End: 2017-05-13

## 2017-05-13 RX ORDER — POTASSIUM CHLORIDE 20 MEQ
20 PACKET (EA) ORAL ONCE
Qty: 0 | Refills: 0 | Status: COMPLETED | OUTPATIENT
Start: 2017-05-13 | End: 2017-05-13

## 2017-05-13 RX ORDER — HEPARIN SODIUM 5000 [USP'U]/ML
INJECTION INTRAVENOUS; SUBCUTANEOUS
Qty: 25000 | Refills: 0 | Status: DISCONTINUED | OUTPATIENT
Start: 2017-05-13 | End: 2017-05-18

## 2017-05-13 RX ORDER — HEPARIN SODIUM 5000 [USP'U]/ML
6000 INJECTION INTRAVENOUS; SUBCUTANEOUS EVERY 6 HOURS
Qty: 0 | Refills: 0 | Status: DISCONTINUED | OUTPATIENT
Start: 2017-05-13 | End: 2017-05-18

## 2017-05-13 RX ORDER — HEPARIN SODIUM 5000 [USP'U]/ML
3000 INJECTION INTRAVENOUS; SUBCUTANEOUS EVERY 6 HOURS
Qty: 0 | Refills: 0 | Status: DISCONTINUED | OUTPATIENT
Start: 2017-05-13 | End: 2017-05-18

## 2017-05-13 RX ORDER — METOPROLOL TARTRATE 50 MG
12.5 TABLET ORAL DAILY
Qty: 0 | Refills: 0 | Status: DISCONTINUED | OUTPATIENT
Start: 2017-05-13 | End: 2017-05-13

## 2017-05-13 RX ADMIN — Medication 12.5 MILLIGRAM(S): at 05:13

## 2017-05-13 RX ADMIN — Medication 100 GRAM(S): at 22:06

## 2017-05-13 RX ADMIN — Medication 2.5 MG/HR: at 09:29

## 2017-05-13 RX ADMIN — Medication 100 MILLIGRAM(S): at 13:02

## 2017-05-13 RX ADMIN — HEPARIN SODIUM 1300 UNIT(S)/HR: 5000 INJECTION INTRAVENOUS; SUBCUTANEOUS at 16:25

## 2017-05-13 RX ADMIN — MEXILETINE HYDROCHLORIDE 150 MILLIGRAM(S): 150 CAPSULE ORAL at 13:02

## 2017-05-13 RX ADMIN — MEXILETINE HYDROCHLORIDE 150 MILLIGRAM(S): 150 CAPSULE ORAL at 05:13

## 2017-05-13 RX ADMIN — Medication 20 MILLIEQUIVALENT(S): at 22:06

## 2017-05-13 RX ADMIN — MILRINONE LACTATE 6.77 MICROGRAM(S)/KG/MIN: 1 INJECTION, SOLUTION INTRAVENOUS at 09:29

## 2017-05-13 RX ADMIN — Medication 100 MILLIGRAM(S): at 21:18

## 2017-05-13 RX ADMIN — Medication 100 MILLIGRAM(S): at 05:13

## 2017-05-13 RX ADMIN — MILRINONE LACTATE 6.77 MICROGRAM(S)/KG/MIN: 1 INJECTION, SOLUTION INTRAVENOUS at 22:15

## 2017-05-13 RX ADMIN — SENNA PLUS 2 TABLET(S): 8.6 TABLET ORAL at 21:18

## 2017-05-13 RX ADMIN — HEPARIN SODIUM 0 UNIT(S)/HR: 5000 INJECTION INTRAVENOUS; SUBCUTANEOUS at 22:50

## 2017-05-13 RX ADMIN — MEXILETINE HYDROCHLORIDE 150 MILLIGRAM(S): 150 CAPSULE ORAL at 21:18

## 2017-05-13 RX ADMIN — PANTOPRAZOLE SODIUM 40 MILLIGRAM(S): 20 TABLET, DELAYED RELEASE ORAL at 05:13

## 2017-05-13 RX ADMIN — HEPARIN SODIUM 1300 UNIT(S)/HR: 5000 INJECTION INTRAVENOUS; SUBCUTANEOUS at 09:25

## 2017-05-13 RX ADMIN — Medication 20 MILLIEQUIVALENT(S): at 13:02

## 2017-05-14 LAB
ALBUMIN SERPL ELPH-MCNC: 3 G/DL — LOW (ref 3.3–5)
ALBUMIN SERPL ELPH-MCNC: 3.1 G/DL — LOW (ref 3.3–5)
ALBUMIN SERPL ELPH-MCNC: 3.4 G/DL — SIGNIFICANT CHANGE UP (ref 3.3–5)
ALP SERPL-CCNC: 117 U/L — SIGNIFICANT CHANGE UP (ref 40–120)
ALP SERPL-CCNC: 123 U/L — HIGH (ref 40–120)
ALP SERPL-CCNC: 134 U/L — HIGH (ref 40–120)
ALT FLD-CCNC: 66 U/L RC — HIGH (ref 10–45)
ALT FLD-CCNC: 70 U/L RC — HIGH (ref 10–45)
ALT FLD-CCNC: 71 U/L RC — HIGH (ref 10–45)
ANION GAP SERPL CALC-SCNC: 14 MMOL/L — SIGNIFICANT CHANGE UP (ref 5–17)
APTT BLD: 122.8 SEC — CRITICAL HIGH (ref 27.5–37.4)
APTT BLD: 77.4 SEC — HIGH (ref 27.5–37.4)
APTT BLD: 93.6 SEC — HIGH (ref 27.5–37.4)
AST SERPL-CCNC: 73 U/L — HIGH (ref 10–40)
AST SERPL-CCNC: 74 U/L — HIGH (ref 10–40)
AST SERPL-CCNC: 77 U/L — HIGH (ref 10–40)
BILIRUB SERPL-MCNC: 1.1 MG/DL — SIGNIFICANT CHANGE UP (ref 0.2–1.2)
BILIRUB SERPL-MCNC: 1.1 MG/DL — SIGNIFICANT CHANGE UP (ref 0.2–1.2)
BILIRUB SERPL-MCNC: 1.2 MG/DL — SIGNIFICANT CHANGE UP (ref 0.2–1.2)
BUN SERPL-MCNC: 14 MG/DL — SIGNIFICANT CHANGE UP (ref 7–23)
BUN SERPL-MCNC: 14 MG/DL — SIGNIFICANT CHANGE UP (ref 7–23)
BUN SERPL-MCNC: 15 MG/DL — SIGNIFICANT CHANGE UP (ref 7–23)
CALCIUM SERPL-MCNC: 8.3 MG/DL — LOW (ref 8.4–10.5)
CALCIUM SERPL-MCNC: 8.7 MG/DL — SIGNIFICANT CHANGE UP (ref 8.4–10.5)
CALCIUM SERPL-MCNC: 9 MG/DL — SIGNIFICANT CHANGE UP (ref 8.4–10.5)
CHLORIDE SERPL-SCNC: 98 MMOL/L — SIGNIFICANT CHANGE UP (ref 96–108)
CHLORIDE SERPL-SCNC: 99 MMOL/L — SIGNIFICANT CHANGE UP (ref 96–108)
CHLORIDE SERPL-SCNC: 99 MMOL/L — SIGNIFICANT CHANGE UP (ref 96–108)
CO2 SERPL-SCNC: 24 MMOL/L — SIGNIFICANT CHANGE UP (ref 22–31)
CO2 SERPL-SCNC: 24 MMOL/L — SIGNIFICANT CHANGE UP (ref 22–31)
CO2 SERPL-SCNC: 26 MMOL/L — SIGNIFICANT CHANGE UP (ref 22–31)
CREAT SERPL-MCNC: 0.8 MG/DL — SIGNIFICANT CHANGE UP (ref 0.5–1.3)
CREAT SERPL-MCNC: 0.86 MG/DL — SIGNIFICANT CHANGE UP (ref 0.5–1.3)
CREAT SERPL-MCNC: 0.92 MG/DL — SIGNIFICANT CHANGE UP (ref 0.5–1.3)
GLUCOSE SERPL-MCNC: 75 MG/DL — SIGNIFICANT CHANGE UP (ref 70–99)
GLUCOSE SERPL-MCNC: 81 MG/DL — SIGNIFICANT CHANGE UP (ref 70–99)
GLUCOSE SERPL-MCNC: 98 MG/DL — SIGNIFICANT CHANGE UP (ref 70–99)
HCT VFR BLD CALC: 33.2 % — LOW (ref 39–50)
HGB BLD-MCNC: 10.7 G/DL — LOW (ref 13–17)
INR BLD: 1.38 RATIO — HIGH (ref 0.88–1.16)
MAGNESIUM SERPL-MCNC: 2 MG/DL — SIGNIFICANT CHANGE UP (ref 1.6–2.6)
MCHC RBC-ENTMCNC: 30.1 PG — SIGNIFICANT CHANGE UP (ref 27–34)
MCHC RBC-ENTMCNC: 32.3 GM/DL — SIGNIFICANT CHANGE UP (ref 32–36)
MCV RBC AUTO: 93.3 FL — SIGNIFICANT CHANGE UP (ref 80–100)
PHOSPHATE SERPL-MCNC: 2.1 MG/DL — LOW (ref 2.5–4.5)
PHOSPHATE SERPL-MCNC: 2.2 MG/DL — LOW (ref 2.5–4.5)
PHOSPHATE SERPL-MCNC: 2.4 MG/DL — LOW (ref 2.5–4.5)
PLATELET # BLD AUTO: 290 K/UL — SIGNIFICANT CHANGE UP (ref 150–400)
POTASSIUM SERPL-MCNC: 3.6 MMOL/L — SIGNIFICANT CHANGE UP (ref 3.5–5.3)
POTASSIUM SERPL-MCNC: 4.3 MMOL/L — SIGNIFICANT CHANGE UP (ref 3.5–5.3)
POTASSIUM SERPL-MCNC: 4.3 MMOL/L — SIGNIFICANT CHANGE UP (ref 3.5–5.3)
POTASSIUM SERPL-SCNC: 3.6 MMOL/L — SIGNIFICANT CHANGE UP (ref 3.5–5.3)
POTASSIUM SERPL-SCNC: 4.3 MMOL/L — SIGNIFICANT CHANGE UP (ref 3.5–5.3)
POTASSIUM SERPL-SCNC: 4.3 MMOL/L — SIGNIFICANT CHANGE UP (ref 3.5–5.3)
PROT SERPL-MCNC: 6.5 G/DL — SIGNIFICANT CHANGE UP (ref 6–8.3)
PROT SERPL-MCNC: 7 G/DL — SIGNIFICANT CHANGE UP (ref 6–8.3)
PROT SERPL-MCNC: 7.7 G/DL — SIGNIFICANT CHANGE UP (ref 6–8.3)
PROTHROM AB SERPL-ACNC: 15 SEC — HIGH (ref 9.8–12.7)
RBC # BLD: 3.56 M/UL — LOW (ref 4.2–5.8)
RBC # FLD: 17.8 % — HIGH (ref 10.3–14.5)
SODIUM SERPL-SCNC: 137 MMOL/L — SIGNIFICANT CHANGE UP (ref 135–145)
WBC # BLD: 8.4 K/UL — SIGNIFICANT CHANGE UP (ref 3.8–10.5)
WBC # FLD AUTO: 8.4 K/UL — SIGNIFICANT CHANGE UP (ref 3.8–10.5)

## 2017-05-14 PROCEDURE — 93010 ELECTROCARDIOGRAM REPORT: CPT

## 2017-05-14 PROCEDURE — 99233 SBSQ HOSP IP/OBS HIGH 50: CPT | Mod: GC

## 2017-05-14 RX ORDER — POTASSIUM CHLORIDE 20 MEQ
20 PACKET (EA) ORAL
Qty: 0 | Refills: 0 | Status: COMPLETED | OUTPATIENT
Start: 2017-05-14 | End: 2017-05-14

## 2017-05-14 RX ORDER — FUROSEMIDE 40 MG
60 TABLET ORAL EVERY 12 HOURS
Qty: 0 | Refills: 0 | Status: DISCONTINUED | OUTPATIENT
Start: 2017-05-14 | End: 2017-05-17

## 2017-05-14 RX ORDER — FUROSEMIDE 40 MG
60 TABLET ORAL ONCE
Qty: 0 | Refills: 0 | Status: COMPLETED | OUTPATIENT
Start: 2017-05-14 | End: 2017-05-14

## 2017-05-14 RX ORDER — POTASSIUM PHOSPHATE, MONOBASIC POTASSIUM PHOSPHATE, DIBASIC 236; 224 MG/ML; MG/ML
15 INJECTION, SOLUTION INTRAVENOUS ONCE
Qty: 0 | Refills: 0 | Status: COMPLETED | OUTPATIENT
Start: 2017-05-14 | End: 2017-05-14

## 2017-05-14 RX ORDER — SODIUM,POTASSIUM PHOSPHATES 278-250MG
1 POWDER IN PACKET (EA) ORAL
Qty: 0 | Refills: 0 | Status: COMPLETED | OUTPATIENT
Start: 2017-05-14 | End: 2017-05-14

## 2017-05-14 RX ADMIN — HEPARIN SODIUM 900 UNIT(S)/HR: 5000 INJECTION INTRAVENOUS; SUBCUTANEOUS at 13:22

## 2017-05-14 RX ADMIN — Medication 20 MILLIEQUIVALENT(S): at 08:09

## 2017-05-14 RX ADMIN — Medication 60 MILLIGRAM(S): at 09:50

## 2017-05-14 RX ADMIN — Medication 20 MILLIEQUIVALENT(S): at 09:51

## 2017-05-14 RX ADMIN — Medication 1 TABLET(S): at 17:14

## 2017-05-14 RX ADMIN — HEPARIN SODIUM 1100 UNIT(S)/HR: 5000 INJECTION INTRAVENOUS; SUBCUTANEOUS at 06:28

## 2017-05-14 RX ADMIN — MEXILETINE HYDROCHLORIDE 150 MILLIGRAM(S): 150 CAPSULE ORAL at 05:57

## 2017-05-14 RX ADMIN — HEPARIN SODIUM 1100 UNIT(S)/HR: 5000 INJECTION INTRAVENOUS; SUBCUTANEOUS at 00:00

## 2017-05-14 RX ADMIN — PANTOPRAZOLE SODIUM 40 MILLIGRAM(S): 20 TABLET, DELAYED RELEASE ORAL at 06:01

## 2017-05-14 RX ADMIN — Medication 100 MILLIGRAM(S): at 05:57

## 2017-05-14 RX ADMIN — HEPARIN SODIUM 900 UNIT(S)/HR: 5000 INJECTION INTRAVENOUS; SUBCUTANEOUS at 18:48

## 2017-05-14 RX ADMIN — Medication 100 MILLIGRAM(S): at 13:22

## 2017-05-14 RX ADMIN — Medication 20 MILLIEQUIVALENT(S): at 11:11

## 2017-05-14 RX ADMIN — POTASSIUM PHOSPHATE, MONOBASIC POTASSIUM PHOSPHATE, DIBASIC 62.5 MILLIMOLE(S): 236; 224 INJECTION, SOLUTION INTRAVENOUS at 18:40

## 2017-05-14 RX ADMIN — MEXILETINE HYDROCHLORIDE 150 MILLIGRAM(S): 150 CAPSULE ORAL at 21:54

## 2017-05-14 RX ADMIN — Medication 1 TABLET(S): at 21:54

## 2017-05-14 RX ADMIN — Medication 60 MILLIGRAM(S): at 17:14

## 2017-05-14 RX ADMIN — Medication 1 TABLET(S): at 08:09

## 2017-05-14 RX ADMIN — Medication 1 TABLET(S): at 11:12

## 2017-05-14 RX ADMIN — MEXILETINE HYDROCHLORIDE 150 MILLIGRAM(S): 150 CAPSULE ORAL at 13:22

## 2017-05-15 LAB
ALBUMIN SERPL ELPH-MCNC: 3 G/DL — LOW (ref 3.3–5)
ALP SERPL-CCNC: 112 U/L — SIGNIFICANT CHANGE UP (ref 40–120)
ALT FLD-CCNC: 59 U/L RC — HIGH (ref 10–45)
ANION GAP SERPL CALC-SCNC: 13 MMOL/L — SIGNIFICANT CHANGE UP (ref 5–17)
APTT BLD: 65 SEC — HIGH (ref 27.5–37.4)
AST SERPL-CCNC: 63 U/L — HIGH (ref 10–40)
BILIRUB SERPL-MCNC: 1 MG/DL — SIGNIFICANT CHANGE UP (ref 0.2–1.2)
BUN SERPL-MCNC: 17 MG/DL — SIGNIFICANT CHANGE UP (ref 7–23)
CALCIUM SERPL-MCNC: 8.3 MG/DL — LOW (ref 8.4–10.5)
CHLORIDE SERPL-SCNC: 101 MMOL/L — SIGNIFICANT CHANGE UP (ref 96–108)
CO2 SERPL-SCNC: 25 MMOL/L — SIGNIFICANT CHANGE UP (ref 22–31)
CREAT ?TM UR-MCNC: 47 MG/DL — SIGNIFICANT CHANGE UP
CREAT SERPL-MCNC: 1.08 MG/DL — SIGNIFICANT CHANGE UP (ref 0.5–1.3)
CULTURE RESULTS: SIGNIFICANT CHANGE UP
CULTURE RESULTS: SIGNIFICANT CHANGE UP
FERRITIN SERPL-MCNC: 112 NG/ML — SIGNIFICANT CHANGE UP (ref 30–400)
GLUCOSE SERPL-MCNC: 106 MG/DL — HIGH (ref 70–99)
HBV CORE AB SER-ACNC: SIGNIFICANT CHANGE UP
HBV SURFACE AB SER-ACNC: SIGNIFICANT CHANGE UP
HBV SURFACE AG SER-ACNC: SIGNIFICANT CHANGE UP
HCT VFR BLD CALC: 32.4 % — LOW (ref 39–50)
HCV AB S/CO SERPL IA: 0.17 S/CO — SIGNIFICANT CHANGE UP
HCV AB SERPL-IMP: SIGNIFICANT CHANGE UP
HGB BLD-MCNC: 10.1 G/DL — LOW (ref 13–17)
HIV 1+2 AB+HIV1 P24 AG SERPL QL IA: SIGNIFICANT CHANGE UP
IRON SATN MFR SERPL: 10 % — LOW (ref 16–55)
IRON SATN MFR SERPL: 26 UG/DL — LOW (ref 45–165)
LDH SERPL L TO P-CCNC: 246 U/L — HIGH (ref 50–242)
MAGNESIUM SERPL-MCNC: 2.1 MG/DL — SIGNIFICANT CHANGE UP (ref 1.6–2.6)
MCHC RBC-ENTMCNC: 28.9 PG — SIGNIFICANT CHANGE UP (ref 27–34)
MCHC RBC-ENTMCNC: 31.1 GM/DL — LOW (ref 32–36)
MCV RBC AUTO: 92.9 FL — SIGNIFICANT CHANGE UP (ref 80–100)
PHOSPHATE SERPL-MCNC: 2.9 MG/DL — SIGNIFICANT CHANGE UP (ref 2.5–4.5)
PLATELET # BLD AUTO: 293 K/UL — SIGNIFICANT CHANGE UP (ref 150–400)
POTASSIUM SERPL-MCNC: 4.3 MMOL/L — SIGNIFICANT CHANGE UP (ref 3.5–5.3)
POTASSIUM SERPL-SCNC: 4.3 MMOL/L — SIGNIFICANT CHANGE UP (ref 3.5–5.3)
PROT ?TM UR-MCNC: 9 MG/DL — SIGNIFICANT CHANGE UP (ref 0–12)
PROT ?TM UR-MCNC: 9 MG/DL — SIGNIFICANT CHANGE UP (ref 0–12)
PROT SERPL-MCNC: 6.5 G/DL — SIGNIFICANT CHANGE UP (ref 6–8.3)
PROT/CREAT UR-RTO: 0.2 — SIGNIFICANT CHANGE UP
PSA FLD-MCNC: 0.66 NG/ML — SIGNIFICANT CHANGE UP (ref 0–4)
RBC # BLD: 3.48 M/UL — LOW (ref 4.2–5.8)
RBC # FLD: 18.1 % — HIGH (ref 10.3–14.5)
SODIUM SERPL-SCNC: 139 MMOL/L — SIGNIFICANT CHANGE UP (ref 135–145)
SPECIMEN SOURCE: SIGNIFICANT CHANGE UP
SPECIMEN SOURCE: SIGNIFICANT CHANGE UP
TIBC SERPL-MCNC: 252 UG/DL — SIGNIFICANT CHANGE UP (ref 220–430)
UIBC SERPL-MCNC: 226 UG/DL — SIGNIFICANT CHANGE UP (ref 110–370)
URATE SERPL-MCNC: 8.6 MG/DL — SIGNIFICANT CHANGE UP (ref 3.4–8.8)
WBC # BLD: 9.1 K/UL — SIGNIFICANT CHANGE UP (ref 3.8–10.5)
WBC # FLD AUTO: 9.1 K/UL — SIGNIFICANT CHANGE UP (ref 3.8–10.5)

## 2017-05-15 PROCEDURE — 99223 1ST HOSP IP/OBS HIGH 75: CPT

## 2017-05-15 PROCEDURE — 90834 PSYTX W PT 45 MINUTES: CPT

## 2017-05-15 PROCEDURE — 76700 US EXAM ABDOM COMPLETE: CPT | Mod: 26

## 2017-05-15 PROCEDURE — 99233 SBSQ HOSP IP/OBS HIGH 50: CPT

## 2017-05-15 PROCEDURE — 74176 CT ABD & PELVIS W/O CONTRAST: CPT | Mod: 26

## 2017-05-15 PROCEDURE — 71250 CT THORAX DX C-: CPT | Mod: 26

## 2017-05-15 PROCEDURE — 93010 ELECTROCARDIOGRAM REPORT: CPT

## 2017-05-15 RX ADMIN — Medication 60 MILLIGRAM(S): at 17:01

## 2017-05-15 RX ADMIN — MILRINONE LACTATE 6.77 MICROGRAM(S)/KG/MIN: 1 INJECTION, SOLUTION INTRAVENOUS at 05:00

## 2017-05-15 RX ADMIN — MEXILETINE HYDROCHLORIDE 150 MILLIGRAM(S): 150 CAPSULE ORAL at 05:56

## 2017-05-15 RX ADMIN — MEXILETINE HYDROCHLORIDE 150 MILLIGRAM(S): 150 CAPSULE ORAL at 17:01

## 2017-05-15 RX ADMIN — HEPARIN SODIUM 900 UNIT(S)/HR: 5000 INJECTION INTRAVENOUS; SUBCUTANEOUS at 02:06

## 2017-05-15 RX ADMIN — Medication 100 MILLIGRAM(S): at 17:01

## 2017-05-15 RX ADMIN — MILRINONE LACTATE 6.77 MICROGRAM(S)/KG/MIN: 1 INJECTION, SOLUTION INTRAVENOUS at 21:20

## 2017-05-15 RX ADMIN — Medication 60 MILLIGRAM(S): at 05:57

## 2017-05-15 RX ADMIN — PANTOPRAZOLE SODIUM 40 MILLIGRAM(S): 20 TABLET, DELAYED RELEASE ORAL at 06:49

## 2017-05-15 RX ADMIN — MEXILETINE HYDROCHLORIDE 150 MILLIGRAM(S): 150 CAPSULE ORAL at 21:20

## 2017-05-15 NOTE — DIETITIAN INITIAL EVALUATION ADULT. - PHYSICAL APPEARANCE
Nutrition physical focused exam: Moderate muscle wasting at temporals, thighs, calfs. Severe wasting at clavicles and deltoids. Moderate fat wasting at ribs and triceps./well nourished Nutrition physical focused exam: Moderate muscle wasting at temporals , thighs, calves. Severe wasting at clavicles and deltoids. Moderate fat wasting at ribs and triceps./well nourished

## 2017-05-15 NOTE — DIETITIAN INITIAL EVALUATION ADULT. - PERTINENT LABORATORY DATA
Glu: 106, Sohail:8.3, Ast:63, Alt:59 Hgb/Hct:10.1/32.4-low, Na:139, K:4.3, Cl:101, BUN:17, Cr:1.08, Glucose:106-high, Ca:8.3-low, Total Protein:6.5, Albumin:3.0-low, Total Bilirubin:1.0,  AlkPhos:112, AST:63-high, ALT:59-high, M.1, Phos:2.9,

## 2017-05-15 NOTE — DIETITIAN INITIAL EVALUATION ADULT. - ENERGY NEEDS
Ht: 5'8", Wt: 151.2lbs, BMI: 22.9kg/m2, IBW: 154lbs(+/-10%), 98%IBW  Pertinent information: Pt admitted with NICM, with Biventricular HF, EF 20%, severe MR and pulmonary HTN with ICD firing. Per chart, VT shock likely due to low flow state. Pt with ongoing LVAD evaluation in the setting of acute on chronic systolic CHF.   No Edema. Skin intact

## 2017-05-15 NOTE — DIETITIAN INITIAL EVALUATION ADULT. - ADHERENCE
Pt states he knows what he eats is bad for him, but stats he doesn't cook and therefore feels forced to eat meals from outside the home. Pt states he knows he is a on a 1L fluid restriction but states he feels like he drinks too much./poor

## 2017-05-15 NOTE — DIETITIAN INITIAL EVALUATION ADULT. - ORAL INTAKE PTA
Pt reports a variable appetite, stats "it goes up and goes down". Pt states 2 weeks ago he had no appetite, barely ate. But this past week his appetite was better. Pt lives with his brother, neither of them cook and most meals are from restaurants. Breakfast: corn flakes, frosted flakes, left overs from dinner, or turkey hillman/egg/cheese on a roll. Lunch: Will typically skip or have a salami vs pastrami sandwich. Dinner: Chinese food, popeyes chicken, Pig feet.  Pt drinks water, soda. Pt snacks on cake at times./fair

## 2017-05-15 NOTE — DIETITIAN INITIAL EVALUATION ADULT. - SOURCE
RN, NP, medical record, Previous RD notes from 4/2017, 2/2017 and 1/2017./patient/other (specify) other (specify)/RN, NP, medical record, Previous RD notes from 4/2017, 2/2017 and 1/2017.

## 2017-05-15 NOTE — DIETITIAN INITIAL EVALUATION ADULT. - NS AS NUTRI INTERV ED CONTENT3
Other (specify)/Purpose of the nutrition education/Reviewed CHF diet education. Discussed Na restriction, foods high in Na to avoid, reading food labels, tips for limiting Na in your diet. Reviewed daily weights, Wt gain parameters to contact MD. Discussed Na intake in relation to fluid retention, edema and Wt gain. Discussed fluid restrictions. Pt verbalized understanding and accepted Heart Failure Nutrition Therapy Handout.  RD remains available for diet education review./Nutrition relationship to health/disease/Recommended modifications

## 2017-05-15 NOTE — DIETITIAN INITIAL EVALUATION ADULT. - NS AS NUTRI INTERV COLLABORAT
Collaboration with other providers/Malnutrition sticker placed in chart, team made aware. RD remains available to monitor PO intake, wt, labs. Collaboration with other providers/Malnutrition sticker placed in chart, team made aware. Please check CRP and Pre albumin. RD remains available to monitor PO intake, wt, labs.

## 2017-05-15 NOTE — DIETITIAN INITIAL EVALUATION ADULT. - FACTORS AFF FOOD INTAKE
varibale PO intake related to "not feeling well" and multiple hospitalizations/other (specify) other (specify)/variable PO intake related to "not feeling well" and multiple hospitalizations

## 2017-05-15 NOTE — DIETITIAN INITIAL EVALUATION ADULT. - SIGNS/SYMPTOMS
7% Wt lossx5 months, decreased PO intake PTA, Moderate/severe fat and muscle wasting. Diet recall, not preforming daily Wt's

## 2017-05-15 NOTE — DIETITIAN INITIAL EVALUATION ADULT. - NUTRITION INTERVENTION
Meals and Snack/Nutrition Education/Medical Food Supplements/Collaboration and Referral of Nutrition Care Nutrition Education

## 2017-05-15 NOTE — DIETITIAN INITIAL EVALUATION ADULT. - NS FNS REASON FOR WEIGHT CHANG
Pt reports a decrease in PO intake related to heart failure. Pt acknowledges his Wt loss, states he appears much thinner than usual. Pt does not preform daily Weights, but states his UBW is ~160lbs; consistent with previous RD note from 1/2017. Pt's Wt today is 151.2lbs, Per previous RD note from 4/2017, Pt's Wt was recorded at 143lbs; suspect fluid retention may be masking additional Wt loss on this admission./decreased po intake/other (specify)

## 2017-05-15 NOTE — DIETITIAN INITIAL EVALUATION ADULT. - OTHER INFO
Nutrition consult received for HF diet education. Pt seen, reports feeling well with a good appetite. Pt states at this time his appetite is normal, noted 100% of breakfast tray consumed. Pt admitted that when his appetite is low he may not eat for 2-3 days at a time which is a concern for him. Pt states he does not own a scale and therefore does not preform daily Wt's. Assessed knowledge on heart healthy and HF diet guidelines and identified knowledge gaps. Pt made aware of relationship between diet guidelines and health/disease. Provided education on therapeutic diet guidelines, pt able to verbalize teach-back points and willing to make identified changes. Pt states he typically takes Folic acid, Vit D, Calcium and MVI but hasn't taken them recently due to hospitalizations. Pt denies GI distress at this time but states he has nausea at times which affects his appetite. Pt denies chewing/swallowing difficulty. NKFA

## 2017-05-16 LAB
ALBUMIN SERPL ELPH-MCNC: 3.2 G/DL — LOW (ref 3.3–5)
ALBUMIN SERPL ELPH-MCNC: 3.2 G/DL — LOW (ref 3.3–5)
ALP SERPL-CCNC: 102 U/L — SIGNIFICANT CHANGE UP (ref 40–120)
ALP SERPL-CCNC: 105 U/L — SIGNIFICANT CHANGE UP (ref 40–120)
ALT FLD-CCNC: 55 U/L RC — HIGH (ref 10–45)
ALT FLD-CCNC: 61 U/L RC — HIGH (ref 10–45)
ANION GAP SERPL CALC-SCNC: 14 MMOL/L — SIGNIFICANT CHANGE UP (ref 5–17)
ANION GAP SERPL CALC-SCNC: 17 MMOL/L — SIGNIFICANT CHANGE UP (ref 5–17)
APTT BLD: 65.1 SEC — HIGH (ref 27.5–37.4)
AST SERPL-CCNC: 62 U/L — HIGH (ref 10–40)
AST SERPL-CCNC: 75 U/L — HIGH (ref 10–40)
BILIRUB SERPL-MCNC: 1 MG/DL — SIGNIFICANT CHANGE UP (ref 0.2–1.2)
BILIRUB SERPL-MCNC: 1 MG/DL — SIGNIFICANT CHANGE UP (ref 0.2–1.2)
BUN SERPL-MCNC: 22 MG/DL — SIGNIFICANT CHANGE UP (ref 7–23)
BUN SERPL-MCNC: 22 MG/DL — SIGNIFICANT CHANGE UP (ref 7–23)
CALCIUM SERPL-MCNC: 8.5 MG/DL — SIGNIFICANT CHANGE UP (ref 8.4–10.5)
CALCIUM SERPL-MCNC: 8.7 MG/DL — SIGNIFICANT CHANGE UP (ref 8.4–10.5)
CHLORIDE SERPL-SCNC: 97 MMOL/L — SIGNIFICANT CHANGE UP (ref 96–108)
CHLORIDE SERPL-SCNC: 99 MMOL/L — SIGNIFICANT CHANGE UP (ref 96–108)
CO2 SERPL-SCNC: 20 MMOL/L — LOW (ref 22–31)
CO2 SERPL-SCNC: 24 MMOL/L — SIGNIFICANT CHANGE UP (ref 22–31)
CREAT SERPL-MCNC: 0.94 MG/DL — SIGNIFICANT CHANGE UP (ref 0.5–1.3)
CREAT SERPL-MCNC: 0.95 MG/DL — SIGNIFICANT CHANGE UP (ref 0.5–1.3)
ERYTHROCYTE [SEDIMENTATION RATE] IN BLOOD: 28 MM/HR — HIGH (ref 0–20)
GLUCOSE SERPL-MCNC: 112 MG/DL — HIGH (ref 70–99)
GLUCOSE SERPL-MCNC: 93 MG/DL — SIGNIFICANT CHANGE UP (ref 70–99)
HCT VFR BLD CALC: 31.9 % — LOW (ref 39–50)
HGB BLD-MCNC: 10.1 G/DL — LOW (ref 13–17)
INR BLD: 1.21 RATIO — HIGH (ref 0.88–1.16)
M TB TUBERC IFN-G BLD QL: 0 IU/ML — SIGNIFICANT CHANGE UP
M TB TUBERC IFN-G BLD QL: 0.03 IU/ML — SIGNIFICANT CHANGE UP
M TB TUBERC IFN-G BLD QL: NEGATIVE — SIGNIFICANT CHANGE UP
MAGNESIUM SERPL-MCNC: 1.9 MG/DL — SIGNIFICANT CHANGE UP (ref 1.6–2.6)
MAGNESIUM SERPL-MCNC: 2.2 MG/DL — SIGNIFICANT CHANGE UP (ref 1.6–2.6)
MCHC RBC-ENTMCNC: 29 PG — SIGNIFICANT CHANGE UP (ref 27–34)
MCHC RBC-ENTMCNC: 31.6 GM/DL — LOW (ref 32–36)
MCV RBC AUTO: 91.9 FL — SIGNIFICANT CHANGE UP (ref 80–100)
MITOGEN IGNF BCKGRD COR BLD-ACNC: 1.99 IU/ML — SIGNIFICANT CHANGE UP
PHOSPHATE SERPL-MCNC: 3.9 MG/DL — SIGNIFICANT CHANGE UP (ref 2.5–4.5)
PLATELET # BLD AUTO: 292 K/UL — SIGNIFICANT CHANGE UP (ref 150–400)
POTASSIUM SERPL-MCNC: 4 MMOL/L — SIGNIFICANT CHANGE UP (ref 3.5–5.3)
POTASSIUM SERPL-MCNC: 4.2 MMOL/L — SIGNIFICANT CHANGE UP (ref 3.5–5.3)
POTASSIUM SERPL-SCNC: 4 MMOL/L — SIGNIFICANT CHANGE UP (ref 3.5–5.3)
POTASSIUM SERPL-SCNC: 4.2 MMOL/L — SIGNIFICANT CHANGE UP (ref 3.5–5.3)
PREALB SERPL-MCNC: 11 MG/DL — LOW (ref 18–45)
PROT SERPL-MCNC: 6.8 G/DL — SIGNIFICANT CHANGE UP (ref 6–8.3)
PROT SERPL-MCNC: 7.2 G/DL — SIGNIFICANT CHANGE UP (ref 6–8.3)
PROTHROM AB SERPL-ACNC: 13.1 SEC — HIGH (ref 9.8–12.7)
RBC # BLD: 3.47 M/UL — LOW (ref 4.2–5.8)
RBC # FLD: 18.2 % — HIGH (ref 10.3–14.5)
SODIUM SERPL-SCNC: 135 MMOL/L — SIGNIFICANT CHANGE UP (ref 135–145)
SODIUM SERPL-SCNC: 136 MMOL/L — SIGNIFICANT CHANGE UP (ref 135–145)
WBC # BLD: 8.8 K/UL — SIGNIFICANT CHANGE UP (ref 3.8–10.5)
WBC # FLD AUTO: 8.8 K/UL — SIGNIFICANT CHANGE UP (ref 3.8–10.5)

## 2017-05-16 PROCEDURE — 99232 SBSQ HOSP IP/OBS MODERATE 35: CPT

## 2017-05-16 PROCEDURE — 93880 EXTRACRANIAL BILAT STUDY: CPT | Mod: 26

## 2017-05-16 PROCEDURE — 99233 SBSQ HOSP IP/OBS HIGH 50: CPT

## 2017-05-16 PROCEDURE — 93923 UPR/LXTR ART STDY 3+ LVLS: CPT | Mod: 26

## 2017-05-16 PROCEDURE — 90832 PSYTX W PT 30 MINUTES: CPT

## 2017-05-16 PROCEDURE — 93010 ELECTROCARDIOGRAM REPORT: CPT

## 2017-05-16 RX ORDER — MAGNESIUM SULFATE 500 MG/ML
1 VIAL (ML) INJECTION ONCE
Qty: 0 | Refills: 0 | Status: COMPLETED | OUTPATIENT
Start: 2017-05-16 | End: 2017-05-16

## 2017-05-16 RX ORDER — METOPROLOL TARTRATE 50 MG
12.5 TABLET ORAL
Qty: 0 | Refills: 0 | Status: DISCONTINUED | OUTPATIENT
Start: 2017-05-16 | End: 2017-05-18

## 2017-05-16 RX ADMIN — MEXILETINE HYDROCHLORIDE 150 MILLIGRAM(S): 150 CAPSULE ORAL at 05:34

## 2017-05-16 RX ADMIN — MEXILETINE HYDROCHLORIDE 150 MILLIGRAM(S): 150 CAPSULE ORAL at 21:20

## 2017-05-16 RX ADMIN — HEPARIN SODIUM 900 UNIT(S)/HR: 5000 INJECTION INTRAVENOUS; SUBCUTANEOUS at 05:32

## 2017-05-16 RX ADMIN — Medication 12.5 MILLIGRAM(S): at 15:44

## 2017-05-16 RX ADMIN — Medication 100 GRAM(S): at 05:39

## 2017-05-16 RX ADMIN — PANTOPRAZOLE SODIUM 40 MILLIGRAM(S): 20 TABLET, DELAYED RELEASE ORAL at 05:34

## 2017-05-16 RX ADMIN — Medication 60 MILLIGRAM(S): at 05:33

## 2017-05-16 RX ADMIN — MEXILETINE HYDROCHLORIDE 150 MILLIGRAM(S): 150 CAPSULE ORAL at 14:09

## 2017-05-16 RX ADMIN — Medication 60 MILLIGRAM(S): at 17:40

## 2017-05-17 LAB
ALBUMIN SERPL ELPH-MCNC: 2.8 G/DL — LOW (ref 3.3–5)
ALP SERPL-CCNC: 104 U/L — SIGNIFICANT CHANGE UP (ref 40–120)
ALT FLD-CCNC: 61 U/L RC — HIGH (ref 10–45)
ANION GAP SERPL CALC-SCNC: 11 MMOL/L — SIGNIFICANT CHANGE UP (ref 5–17)
APTT BLD: 65.6 SEC — HIGH (ref 27.5–37.4)
AST SERPL-CCNC: 79 U/L — HIGH (ref 10–40)
BILIRUB SERPL-MCNC: 0.8 MG/DL — SIGNIFICANT CHANGE UP (ref 0.2–1.2)
BUN SERPL-MCNC: 26 MG/DL — HIGH (ref 7–23)
CALCIUM SERPL-MCNC: 8.7 MG/DL — SIGNIFICANT CHANGE UP (ref 8.4–10.5)
CHLORIDE SERPL-SCNC: 100 MMOL/L — SIGNIFICANT CHANGE UP (ref 96–108)
CO2 SERPL-SCNC: 24 MMOL/L — SIGNIFICANT CHANGE UP (ref 22–31)
CREAT SERPL-MCNC: 1.01 MG/DL — SIGNIFICANT CHANGE UP (ref 0.5–1.3)
GLUCOSE SERPL-MCNC: 126 MG/DL — HIGH (ref 70–99)
HCT VFR BLD CALC: 29.5 % — LOW (ref 39–50)
HGB BLD-MCNC: 9.4 G/DL — LOW (ref 13–17)
INR BLD: 1.21 RATIO — HIGH (ref 0.88–1.16)
MAGNESIUM SERPL-MCNC: 2 MG/DL — SIGNIFICANT CHANGE UP (ref 1.6–2.6)
MCHC RBC-ENTMCNC: 29.2 PG — SIGNIFICANT CHANGE UP (ref 27–34)
MCHC RBC-ENTMCNC: 32 GM/DL — SIGNIFICANT CHANGE UP (ref 32–36)
MCV RBC AUTO: 91.3 FL — SIGNIFICANT CHANGE UP (ref 80–100)
PHOSPHATE SERPL-MCNC: 2.9 MG/DL — SIGNIFICANT CHANGE UP (ref 2.5–4.5)
PLATELET # BLD AUTO: 262 K/UL — SIGNIFICANT CHANGE UP (ref 150–400)
POTASSIUM SERPL-MCNC: 3.7 MMOL/L — SIGNIFICANT CHANGE UP (ref 3.5–5.3)
POTASSIUM SERPL-SCNC: 3.7 MMOL/L — SIGNIFICANT CHANGE UP (ref 3.5–5.3)
PROT SERPL-MCNC: 6.5 G/DL — SIGNIFICANT CHANGE UP (ref 6–8.3)
PROTHROM AB SERPL-ACNC: 13.2 SEC — HIGH (ref 9.8–12.7)
RBC # BLD: 3.23 M/UL — LOW (ref 4.2–5.8)
RBC # FLD: 18.2 % — HIGH (ref 10.3–14.5)
SODIUM SERPL-SCNC: 135 MMOL/L — SIGNIFICANT CHANGE UP (ref 135–145)
WBC # BLD: 9 K/UL — SIGNIFICANT CHANGE UP (ref 3.8–10.5)
WBC # FLD AUTO: 9 K/UL — SIGNIFICANT CHANGE UP (ref 3.8–10.5)

## 2017-05-17 PROCEDURE — 93010 ELECTROCARDIOGRAM REPORT: CPT

## 2017-05-17 PROCEDURE — 90832 PSYTX W PT 30 MINUTES: CPT

## 2017-05-17 PROCEDURE — 99233 SBSQ HOSP IP/OBS HIGH 50: CPT

## 2017-05-17 PROCEDURE — 99232 SBSQ HOSP IP/OBS MODERATE 35: CPT

## 2017-05-17 RX ORDER — POTASSIUM CHLORIDE 20 MEQ
40 PACKET (EA) ORAL ONCE
Qty: 0 | Refills: 0 | Status: COMPLETED | OUTPATIENT
Start: 2017-05-17 | End: 2017-05-17

## 2017-05-17 RX ORDER — HYDROMORPHONE HYDROCHLORIDE 2 MG/ML
0.5 INJECTION INTRAMUSCULAR; INTRAVENOUS; SUBCUTANEOUS ONCE
Qty: 0 | Refills: 0 | Status: DISCONTINUED | OUTPATIENT
Start: 2017-05-17 | End: 2017-05-17

## 2017-05-17 RX ADMIN — MILRINONE LACTATE 6.77 MICROGRAM(S)/KG/MIN: 1 INJECTION, SOLUTION INTRAVENOUS at 21:43

## 2017-05-17 RX ADMIN — Medication 100 MILLIGRAM(S): at 21:41

## 2017-05-17 RX ADMIN — Medication 40 MILLIEQUIVALENT(S): at 05:45

## 2017-05-17 RX ADMIN — Medication 12.5 MILLIGRAM(S): at 21:41

## 2017-05-17 RX ADMIN — Medication 12.5 MILLIGRAM(S): at 10:40

## 2017-05-17 RX ADMIN — HEPARIN SODIUM 900 UNIT(S)/HR: 5000 INJECTION INTRAVENOUS; SUBCUTANEOUS at 04:46

## 2017-05-17 RX ADMIN — Medication 60 MILLIGRAM(S): at 05:45

## 2017-05-17 RX ADMIN — MEXILETINE HYDROCHLORIDE 150 MILLIGRAM(S): 150 CAPSULE ORAL at 05:45

## 2017-05-17 RX ADMIN — PANTOPRAZOLE SODIUM 40 MILLIGRAM(S): 20 TABLET, DELAYED RELEASE ORAL at 06:31

## 2017-05-17 RX ADMIN — MEXILETINE HYDROCHLORIDE 150 MILLIGRAM(S): 150 CAPSULE ORAL at 14:24

## 2017-05-17 RX ADMIN — MEXILETINE HYDROCHLORIDE 150 MILLIGRAM(S): 150 CAPSULE ORAL at 21:41

## 2017-05-18 ENCOUNTER — TRANSCRIPTION ENCOUNTER (OUTPATIENT)
Age: 67
End: 2017-05-18

## 2017-05-18 LAB
ALBUMIN SERPL ELPH-MCNC: 3.3 G/DL — SIGNIFICANT CHANGE UP (ref 3.3–5)
ALP SERPL-CCNC: 112 U/L — SIGNIFICANT CHANGE UP (ref 40–120)
ALT FLD-CCNC: 70 U/L RC — HIGH (ref 10–45)
ANION GAP SERPL CALC-SCNC: 15 MMOL/L — SIGNIFICANT CHANGE UP (ref 5–17)
APTT BLD: 73.6 SEC — HIGH (ref 27.5–37.4)
AST SERPL-CCNC: 96 U/L — HIGH (ref 10–40)
BILIRUB SERPL-MCNC: 1 MG/DL — SIGNIFICANT CHANGE UP (ref 0.2–1.2)
BUN SERPL-MCNC: 26 MG/DL — HIGH (ref 7–23)
CALCIUM SERPL-MCNC: 9.1 MG/DL — SIGNIFICANT CHANGE UP (ref 8.4–10.5)
CHLORIDE SERPL-SCNC: 100 MMOL/L — SIGNIFICANT CHANGE UP (ref 96–108)
CO2 SERPL-SCNC: 22 MMOL/L — SIGNIFICANT CHANGE UP (ref 22–31)
CREAT SERPL-MCNC: 1 MG/DL — SIGNIFICANT CHANGE UP (ref 0.5–1.3)
GLUCOSE SERPL-MCNC: 109 MG/DL — HIGH (ref 70–99)
HCT VFR BLD CALC: 31.3 % — LOW (ref 39–50)
HGB BLD-MCNC: 10 G/DL — LOW (ref 13–17)
INR BLD: 1.07 RATIO — SIGNIFICANT CHANGE UP (ref 0.88–1.16)
MAGNESIUM SERPL-MCNC: 2 MG/DL — SIGNIFICANT CHANGE UP (ref 1.6–2.6)
MCHC RBC-ENTMCNC: 29.1 PG — SIGNIFICANT CHANGE UP (ref 27–34)
MCHC RBC-ENTMCNC: 31.9 GM/DL — LOW (ref 32–36)
MCV RBC AUTO: 91.2 FL — SIGNIFICANT CHANGE UP (ref 80–100)
PHOSPHATE SERPL-MCNC: 3.3 MG/DL — SIGNIFICANT CHANGE UP (ref 2.5–4.5)
PLATELET # BLD AUTO: 263 K/UL — SIGNIFICANT CHANGE UP (ref 150–400)
POTASSIUM SERPL-MCNC: 4.2 MMOL/L — SIGNIFICANT CHANGE UP (ref 3.5–5.3)
POTASSIUM SERPL-SCNC: 4.2 MMOL/L — SIGNIFICANT CHANGE UP (ref 3.5–5.3)
PROT SERPL-MCNC: 7.3 G/DL — SIGNIFICANT CHANGE UP (ref 6–8.3)
PROTHROM AB SERPL-ACNC: 11.6 SEC — SIGNIFICANT CHANGE UP (ref 9.8–12.7)
RBC # BLD: 3.43 M/UL — LOW (ref 4.2–5.8)
RBC # FLD: 18.4 % — HIGH (ref 10.3–14.5)
SODIUM SERPL-SCNC: 137 MMOL/L — SIGNIFICANT CHANGE UP (ref 135–145)
WBC # BLD: 8.2 K/UL — SIGNIFICANT CHANGE UP (ref 3.8–10.5)
WBC # FLD AUTO: 8.2 K/UL — SIGNIFICANT CHANGE UP (ref 3.8–10.5)

## 2017-05-18 PROCEDURE — 99233 SBSQ HOSP IP/OBS HIGH 50: CPT

## 2017-05-18 PROCEDURE — 71010: CPT | Mod: 26

## 2017-05-18 PROCEDURE — 99233 SBSQ HOSP IP/OBS HIGH 50: CPT | Mod: GC

## 2017-05-18 PROCEDURE — 90832 PSYTX W PT 30 MINUTES: CPT

## 2017-05-18 RX ORDER — METOPROLOL TARTRATE 50 MG
25 TABLET ORAL DAILY
Qty: 0 | Refills: 0 | Status: DISCONTINUED | OUTPATIENT
Start: 2017-05-18 | End: 2017-05-19

## 2017-05-18 RX ORDER — RIVAROXABAN 15 MG-20MG
20 KIT ORAL DAILY
Qty: 0 | Refills: 0 | Status: DISCONTINUED | OUTPATIENT
Start: 2017-05-18 | End: 2017-05-19

## 2017-05-18 RX ADMIN — HEPARIN SODIUM 900 UNIT(S)/HR: 5000 INJECTION INTRAVENOUS; SUBCUTANEOUS at 04:40

## 2017-05-18 RX ADMIN — MILRINONE LACTATE 6.77 MICROGRAM(S)/KG/MIN: 1 INJECTION, SOLUTION INTRAVENOUS at 21:15

## 2017-05-18 RX ADMIN — Medication 100 MILLIGRAM(S): at 10:16

## 2017-05-18 RX ADMIN — MEXILETINE HYDROCHLORIDE 150 MILLIGRAM(S): 150 CAPSULE ORAL at 14:33

## 2017-05-18 RX ADMIN — RIVAROXABAN 20 MILLIGRAM(S): KIT at 17:48

## 2017-05-18 RX ADMIN — Medication 60 MILLIGRAM(S): at 21:15

## 2017-05-18 RX ADMIN — PANTOPRAZOLE SODIUM 40 MILLIGRAM(S): 20 TABLET, DELAYED RELEASE ORAL at 05:15

## 2017-05-18 RX ADMIN — MEXILETINE HYDROCHLORIDE 150 MILLIGRAM(S): 150 CAPSULE ORAL at 05:15

## 2017-05-18 RX ADMIN — Medication 25 MILLIGRAM(S): at 10:16

## 2017-05-18 RX ADMIN — MILRINONE LACTATE 6.77 MICROGRAM(S)/KG/MIN: 1 INJECTION, SOLUTION INTRAVENOUS at 17:47

## 2017-05-18 RX ADMIN — MEXILETINE HYDROCHLORIDE 150 MILLIGRAM(S): 150 CAPSULE ORAL at 21:15

## 2017-05-18 NOTE — DISCHARGE NOTE ADULT - MEDICATION SUMMARY - MEDICATIONS TO STOP TAKING
I will STOP taking the medications listed below when I get home from the hospital:    spironolactone 25 mg oral tablet  -- 0.5 tab(s) by mouth once a day

## 2017-05-18 NOTE — DISCHARGE NOTE ADULT - HOSPITAL COURSE
This is a 67M w/ NICM w/ BiVHFrEF(EF20%) s/p AICD(St.Adrian) w/ recent admission for AdHF (d/c 5/5 from [facility]), severe MR & severe pulm HTN on TTE(4/29/17), & paroxysmal afib s/p ablation on xarelto sent to [facility] by EP due to ICD shock on remote monitoring for which the patient did not feel. The pt cannot specifically correlate the timing of ICD firing w/ a particular change in health however has had dizziness over the last few days and noticed progressive SOB x2d at rest and comments that it is the most severe his SOB has ever been. He does not report fainting, CP, palpitations, cough, n/v/d, dysuria, fevers, or chills. The pt does report having b/l LE swelling at times as baseline. Furthermore, the pt reports coming to the hospital because he was told to by EP due to ICD firing.    In the ED  VS 35.8C(96.5), 105/80, 112, 17 on NC3L 100%  8.2>11.3/35.6<347  139/4.5/99/20/40/1.74<122  AG20; Lactate 6.0; baseline Cr1.1 (CKDII)  Tbili 2.0; AST/ALT: 79/63  Procalcitonin: 0.34  ProBNP: 7218  CE Trop 0.03->0.04->0.04  ABG:pH7.47,pCO2 24, pO2 102  UA: Nitrite/LeukEst Neg  RVP: Neg    CXR(5/10): The heart is enlarged. The lungs are clear. A pacer is in good position. No pneumothorax.  TTE (4/29/17) EF23% Severe MR; Severe global systolic dysfuction. Right ventricular enlargement w/ decreased RV systolic dysfunction. Moderate TR. Severe Pulm HTN  RHC (4/12/17): RA9 RV 32/5 PA 41/12 PCWP14    ED gave: Vanc 1g & Ertapenem 1g, Lasix 40mgIV x2, vitamin K 5mg    He was admitted to CCU - ADHF with ICD firing.  NORMAN - resolved; supra-therapeutic INR - resolved; and LVAD workup. This is a 67M w/ NICM w/ BiVHFrEF(EF20%) s/p AICD(St.Adrian) w/ recent admission for AdHF (d/c 5/5 from [facility]), severe MR & severe pulm HTN on TTE(4/29/17), & paroxysmal afib s/p ablation on xarelto sent to [facility] by EP due to ICD shock on remote monitoring for which the patient did not feel. The pt cannot specifically correlate the timing of ICD firing w/ a particular change in health however has had dizziness over the last few days and noticed progressive SOB x2d at rest and comments that it is the most severe his SOB has ever been. He does not report fainting, CP, palpitations, cough, n/v/d, dysuria, fevers, or chills. The pt does report having b/l LE swelling at times as baseline. Furthermore, the pt reports coming to the hospital because he was told to by EP due to ICD firing.    In the ED  VS 35.8C(96.5), 105/80, 112, 17 on NC3L 100%  8.2>11.3/35.6<347  139/4.5/99/20/40/1.74<122  AG20; Lactate 6.0; baseline Cr1.1 (CKDII)  Tbili 2.0; AST/ALT: 79/63  Procalcitonin: 0.34  ProBNP: 7218  CE Trop 0.03->0.04->0.04  ABG:pH7.47,pCO2 24, pO2 102  UA: Nitrite/LeukEst Neg  RVP: Neg    CXR(5/10): The heart is enlarged. The lungs are clear. A pacer is in good position. No pneumothorax.  TTE (4/29/17) EF23% Severe MR; Severe global systolic dysfuction. Right ventricular enlargement w/ decreased RV systolic dysfunction. Moderate TR. Severe Pulm HTN  RHC (4/12/17): RA9 RV 32/5 PA 41/12 PCWP14    ED gave: Vanc 1g & Ertapenem 1g, Lasix 40mgIV x2, vitamin K 5mg    Admitted to CCU antibiotics stopped low suspicion for infection.  EP - VT likely secondary to low flow state / vol overload no change in antiarrhythmics Started on Milrinone & lasix infusions. Heart failure following.  Seen by LVAD team work up progress; Behavioural cardiology seen.  IV lasix infusion transitioned to BID then changed to torsemide.  5/18 PICC line placed for home milrinone infusion.    He was admitted to CCU - ADHF with ICD firing.  NORMAN - resolved; supra-therapeutic INR - resolved; and LVAD workup.    Region care for home Milrinone infusion.  Follow up with Dr LAUREN Hurtado appointment on May 25th at 2pm.

## 2017-05-18 NOTE — DISCHARGE NOTE ADULT - MEDICATION SUMMARY - MEDICATIONS TO TAKE
I will START or STAY ON the medications listed below when I get home from the hospital:    mexiletine 150 mg oral capsule  -- 1 cap(s) by mouth 3 times a day  -- Indication: For Ventricular tachycardia    rivaroxaban 20 mg oral tablet  -- 1 tab(s) by mouth once a day  -- Indication: For Paroxysmal atrial fibrillation    metoprolol succinate 25 mg oral tablet, extended release  -- 1 tab(s) by mouth once a day  -- Indication: For Acute on chronic combined systolic and diastolic heart failure    torsemide 20 mg oral tablet  -- 3 tab(s) by mouth every 12 hours  -- Indication: For Acute on chronic combined systolic and diastolic heart failure    milrinone  -- 0.3 mcg/kg/min intravenous   -- Indication: For Acute on chronic combined systolic and diastolic heart failure    senna oral tablet  -- 2 tab(s) by mouth once a day (at bedtime)  -- Indication: For laxative    docusate sodium 100 mg oral capsule  -- 1 cap(s) by mouth 3 times a day  -- Indication: For laxative    pantoprazole 40 mg oral delayed release tablet  -- 1 tab(s) by mouth 2 times a day (before meals)  -- Indication: For Stomach protectant    Centrum oral tablet  -- 1 tab(s) by mouth once a day  -- Indication: For Vitamin supplement    Oyster Shell Calcium with Vitamin D 500 mg-200 intl units oral tablet  -- 1 tab(s) by mouth once a day  -- Indication: For Supplement    folic acid 1 mg oral tablet  -- 1 tab(s) by mouth once a day  -- Indication: For Supplement    Vitamin C 500 mg oral tablet  -- 1 tab(s) by mouth once a day  -- Indication: For Supplement

## 2017-05-18 NOTE — DISCHARGE NOTE ADULT - MEDICATION SUMMARY - MEDICATIONS TO CHANGE
I will SWITCH the dose or number of times a day I take the medications listed below when I get home from the hospital:    metoprolol succinate  -- 12.5 milligram(s) by mouth once a day    torsemide 20 mg oral tablet  -- 1 tab(s) by mouth once a day

## 2017-05-18 NOTE — DISCHARGE NOTE ADULT - PATIENT PORTAL LINK FT
“You can access the FollowHealth Patient Portal, offered by [facility], by registering with the following website: [email or web address]”

## 2017-05-18 NOTE — DISCHARGE NOTE ADULT - NS AS DC HF EDUCATION INSTRUCTIONS
Report weight gain of 2 or more pounds in one day or 3 or more pounds in one week, worsening shortness of breath, fatigue, weakness, increased swelling of hands and feet to primary care provider/Call Primary Care Provider for follow-up after discharge/Monitor Weight Daily/Low salt diet/Activities as tolerated

## 2017-05-18 NOTE — DISCHARGE NOTE ADULT - PLAN OF CARE
You are going home on a continuous infusion of medication - Milrinone to help your heart.  Region care will be taking care of the infusion.    Take your medications as prescribed. Follow a  low-salt, low cholesterol heart healthy diet. Weigh yourself every day; call your doctor if you gain 2 pounds over one to two days or 3 pounds over three days. Get to or maintain a healthy weight; ask your heart failure team for referrals to a registered dietitian if needed. Be active (check with your physician or cardiologist first). Find healthy ways to deal with stress, such as deep breathing, meditation, exercise, and doing hobbies that you enjoy.  PICC care You have a PICC line which goes to your heart in order for you to get your intravenous medication. Always maintain hand hygiene and ensure that anyone handling your PICC washes their hands before starting to work on the PICC line or medication.  You are not allowed to bathe or swim while you have the PICC line in place.  You may shower only by ensuring that the PICC is kept dry (I.e. by taping plastic bag around the site in your arm. Do not use any sharp or pointy objects around your PICC site. Look at your PICC line and site every day. If you notice any drainage that is yellow/green, foul smelling, or if it is tender; let region care team know. You may have an infection and may need the PICC line removed to avoid the infection spreading to your blood. Avoid lifting any weight heavier than 10 lbs; this could dislodge your PICC line. Activity Ensure that you ambulate daily; it is important to exercise to help improve your heart function. Follow-Up Follow up with your primary care provider and Dr. Hurtado. resolved Follow up with cardiologist and primary care physician You have restarted Xarelto. Xarelto is a blood thinner and therefore you are at higher risk of bleeding. If you experience any signs of atrial fibrillation such as dizziness, fatigue, palpitations, or fainting please inform Dr. Kessler as soon as possible or go to your nearest emergency room.  If you experience any signs of bleeding such as bleeding gums, bloody sputum, bleeding in your stool or black stool inform your primary care doctor; ongoing medical Follow up with cardiologist Your AICD can sense and treat certain abnormal heart beats  If your AICD gives you a shock (you will probably feel it), let your doctor know so he/she can check the device & make changes in the device as needed or change your medication.  Check your device as directed on a regular basis  Avoid electric or magnetic equipment   Tell any doctors or nurses that you have an AICD  You cannot have an MRI  You may want to get a medical alert bracelet indicating that you have an AICD  Carry your AICD card in your wallet.  It is important to know what brand of AICD you have in case of emergency you will not have episode of VT will remain free of symptoms You are going home on a continuous infusion of medication - Milrinone to help your heart.  Region care will be taking care of the infusion.    Take your medications as prescribed. Follow a  low-salt, low cholesterol heart healthy diet. Weigh yourself every day; call your doctor if you gain 2 pounds over one to two days or 3 pounds over three days. Get to or maintain a healthy weight; ask your heart failure team for referrals to a registered dietitian if needed. Be active (check with your physician or cardiologist first). Find healthy ways to deal with stress, such as deep breathing, meditation, exercise, and doing hobbies that you enjoy.  PICC care You have a PICC line which goes to your heart in order for you to get your intravenous medication. Always maintain hand hygiene and ensure that anyone handling your PICC washes their hands before starting to work on the PICC line or medication.  You are not allowed to bathe or swim while you have the PICC line in place.  You may shower only by ensuring that the PICC is kept dry (I.e. by taping plastic bag around the site in your arm. Do not use any sharp or pointy objects around your PICC site. Look at your PICC line and site every day. If you notice any drainage that is yellow/green, foul smelling, or if it is tender; let region care team know. You may have an infection and may need the PICC line removed to avoid the infection spreading to your blood. Avoid lifting any weight heavier than 10 lbs; this could dislodge your PICC line. Activity Ensure that you ambulate daily; it is important to exercise to help improve your heart function. Follow-Up Follow up with your primary care provider and Dr. LAUREN Hurtado.

## 2017-05-18 NOTE — DISCHARGE NOTE ADULT - NSFTFHOME1RD_GEN_ALL_CORE
Chest pain/weakness during/after ambulation greater than 20 feet/Shortness of breath with minimal ambulation

## 2017-05-18 NOTE — DISCHARGE NOTE ADULT - NSFTFSERV1RD_GEN_ALL_CORE
central venous access care/medication teaching and assessment/rehabilitation services/teaching and training/observation and assessment

## 2017-05-18 NOTE — DISCHARGE NOTE ADULT - CARE PROVIDER_API CALL
Júnior Hurtado), Adv Heart [facility] Card[facility]  [address] [place] [address]  Phone: [phone]  Fax: [phone]

## 2017-05-18 NOTE — DISCHARGE NOTE ADULT - SECONDARY DIAGNOSIS.
NORMAN (acute kidney injury) Paroxysmal atrial fibrillation INR (international normal ratio) abnormal Ventricular tachycardia

## 2017-05-18 NOTE — DISCHARGE NOTE ADULT - CARE PLAN
Principal Discharge DX:	Acute on chronic combined systolic and diastolic heart failure  Secondary Diagnosis:	NORMAN (acute kidney injury)  Secondary Diagnosis:	Paroxysmal atrial fibrillation  Secondary Diagnosis:	INR (international normal ratio) abnormal  Secondary Diagnosis:	Ventricular tachycardia Principal Discharge DX:	Acute on chronic combined systolic and diastolic heart failure  Instructions for follow-up, activity and diet:	You are going home on a continuous infusion of medication - Milrinone to help your heart.  Region care will be taking care of the infusion.    Take your medications as prescribed. Follow a  low-salt, low cholesterol heart healthy diet. Weigh yourself every day; call your doctor if you gain 2 pounds over one to two days or 3 pounds over three days. Get to or maintain a healthy weight; ask your heart failure team for referrals to a registered dietitian if needed. Be active (check with your physician or cardiologist first). Find healthy ways to deal with stress, such as deep breathing, meditation, exercise, and doing hobbies that you enjoy.  PICC care You have a PICC line which goes to your heart in order for you to get your intravenous medication. Always maintain hand hygiene and ensure that anyone handling your PICC washes their hands before starting to work on the PICC line or medication.  You are not allowed to bathe or swim while you have the PICC line in place.  You may shower only by ensuring that the PICC is kept dry (I.e. by taping plastic bag around the site in your arm. Do not use any sharp or pointy objects around your PICC site. Look at your PICC line and site every day. If you notice any drainage that is yellow/green, foul smelling, or if it is tender; let region care team know. You may have an infection and may need the PICC line removed to avoid the infection spreading to your blood. Avoid lifting any weight heavier than 10 lbs; this could dislodge your PICC line. Activity Ensure that you ambulate daily; it is important to exercise to help improve your heart function. Follow-Up Follow up with your primary care provider and Dr. Hurtado.  Secondary Diagnosis:	NORMAN (acute kidney injury)  Goal:	resolved  Instructions for follow-up, activity and diet:	Follow up with cardiologist and primary care physician  Secondary Diagnosis:	Paroxysmal atrial fibrillation  Goal:	ongoing medical  Instructions for follow-up, activity and diet:	You have restarted Xarelto. Xarelto is a blood thinner and therefore you are at higher risk of bleeding. If you experience any signs of atrial fibrillation such as dizziness, fatigue, palpitations, or fainting please inform Dr. Kessler as soon as possible or go to your nearest emergency room.  If you experience any signs of bleeding such as bleeding gums, bloody sputum, bleeding in your stool or black stool inform your primary care doctor;  Secondary Diagnosis:	INR (international normal ratio) abnormal  Goal:	ongoing medical  Instructions for follow-up, activity and diet:	Follow up with cardiologist  Secondary Diagnosis:	Ventricular tachycardia  Goal:	you will not have episode of VT  Instructions for follow-up, activity and diet:	Your AICD can sense and treat certain abnormal heart beats  If your AICD gives you a shock (you will probably feel it), let your doctor know so he/she can check the device & make changes in the device as needed or change your medication.  Check your device as directed on a regular basis  Avoid electric or magnetic equipment   Tell any doctors or nurses that you have an AICD  You cannot have an MRI  You may want to get a medical alert bracelet indicating that you have an AICD  Carry your AICD card in your wallet.  It is important to know what brand of AICD you have in case of emergency Principal Discharge DX:	Acute on chronic combined systolic and diastolic heart failure  Goal:	will remain free of symptoms  Instructions for follow-up, activity and diet:	You are going home on a continuous infusion of medication - Milrinone to help your heart.  Region care will be taking care of the infusion.    Take your medications as prescribed. Follow a  low-salt, low cholesterol heart healthy diet. Weigh yourself every day; call your doctor if you gain 2 pounds over one to two days or 3 pounds over three days. Get to or maintain a healthy weight; ask your heart failure team for referrals to a registered dietitian if needed. Be active (check with your physician or cardiologist first). Find healthy ways to deal with stress, such as deep breathing, meditation, exercise, and doing hobbies that you enjoy.  PICC care You have a PICC line which goes to your heart in order for you to get your intravenous medication. Always maintain hand hygiene and ensure that anyone handling your PICC washes their hands before starting to work on the PICC line or medication.  You are not allowed to bathe or swim while you have the PICC line in place.  You may shower only by ensuring that the PICC is kept dry (I.e. by taping plastic bag around the site in your arm. Do not use any sharp or pointy objects around your PICC site. Look at your PICC line and site every day. If you notice any drainage that is yellow/green, foul smelling, or if it is tender; let region care team know. You may have an infection and may need the PICC line removed to avoid the infection spreading to your blood. Avoid lifting any weight heavier than 10 lbs; this could dislodge your PICC line. Activity Ensure that you ambulate daily; it is important to exercise to help improve your heart function. Follow-Up Follow up with your primary care provider and Dr. LAUREN Hurtado.  Secondary Diagnosis:	NORMAN (acute kidney injury)  Goal:	resolved  Instructions for follow-up, activity and diet:	Follow up with cardiologist and primary care physician  Secondary Diagnosis:	Paroxysmal atrial fibrillation  Goal:	ongoing medical  Instructions for follow-up, activity and diet:	You have restarted Xarelto. Xarelto is a blood thinner and therefore you are at higher risk of bleeding. If you experience any signs of atrial fibrillation such as dizziness, fatigue, palpitations, or fainting please inform Dr. Kessler as soon as possible or go to your nearest emergency room.  If you experience any signs of bleeding such as bleeding gums, bloody sputum, bleeding in your stool or black stool inform your primary care doctor;  Secondary Diagnosis:	INR (international normal ratio) abnormal  Goal:	ongoing medical  Instructions for follow-up, activity and diet:	Follow up with cardiologist  Secondary Diagnosis:	Ventricular tachycardia  Goal:	you will not have episode of VT  Instructions for follow-up, activity and diet:	Your AICD can sense and treat certain abnormal heart beats  If your AICD gives you a shock (you will probably feel it), let your doctor know so he/she can check the device & make changes in the device as needed or change your medication.  Check your device as directed on a regular basis  Avoid electric or magnetic equipment   Tell any doctors or nurses that you have an AICD  You cannot have an MRI  You may want to get a medical alert bracelet indicating that you have an AICD  Carry your AICD card in your wallet.  It is important to know what brand of AICD you have in case of emergency

## 2017-05-18 NOTE — DISCHARGE NOTE ADULT - CARE PROVIDERS DIRECT ADDRESSES
[email or web address]

## 2017-05-19 VITALS
RESPIRATION RATE: 20 BRPM | HEART RATE: 117 BPM | TEMPERATURE: 97 F | OXYGEN SATURATION: 96 % | DIASTOLIC BLOOD PRESSURE: 65 MMHG | SYSTOLIC BLOOD PRESSURE: 106 MMHG

## 2017-05-19 LAB
ALBUMIN SERPL ELPH-MCNC: 3.3 G/DL — SIGNIFICANT CHANGE UP (ref 3.3–5)
ALP SERPL-CCNC: 103 U/L — SIGNIFICANT CHANGE UP (ref 40–120)
ALT FLD-CCNC: 69 U/L RC — HIGH (ref 10–45)
ANION GAP SERPL CALC-SCNC: 15 MMOL/L — SIGNIFICANT CHANGE UP (ref 5–17)
APTT BLD: 37 SEC — SIGNIFICANT CHANGE UP (ref 27.5–37.4)
AST SERPL-CCNC: 87 U/L — HIGH (ref 10–40)
BILIRUB SERPL-MCNC: 1 MG/DL — SIGNIFICANT CHANGE UP (ref 0.2–1.2)
BUN SERPL-MCNC: 32 MG/DL — HIGH (ref 7–23)
CALCIUM SERPL-MCNC: 9.1 MG/DL — SIGNIFICANT CHANGE UP (ref 8.4–10.5)
CHLORIDE SERPL-SCNC: 97 MMOL/L — SIGNIFICANT CHANGE UP (ref 96–108)
CO2 SERPL-SCNC: 23 MMOL/L — SIGNIFICANT CHANGE UP (ref 22–31)
CREAT SERPL-MCNC: 1.25 MG/DL — SIGNIFICANT CHANGE UP (ref 0.5–1.3)
GLUCOSE SERPL-MCNC: 102 MG/DL — HIGH (ref 70–99)
HCT VFR BLD CALC: 30.3 % — LOW (ref 39–50)
HGB BLD-MCNC: 9.6 G/DL — LOW (ref 13–17)
INR BLD: 1.63 RATIO — HIGH (ref 0.88–1.16)
INR BLD: 2.37 RATIO — HIGH (ref 0.88–1.16)
MAGNESIUM SERPL-MCNC: 2 MG/DL — SIGNIFICANT CHANGE UP (ref 1.6–2.6)
MCHC RBC-ENTMCNC: 29.1 PG — SIGNIFICANT CHANGE UP (ref 27–34)
MCHC RBC-ENTMCNC: 31.7 GM/DL — LOW (ref 32–36)
MCV RBC AUTO: 91.8 FL — SIGNIFICANT CHANGE UP (ref 80–100)
PHOSPHATE SERPL-MCNC: 4.7 MG/DL — HIGH (ref 2.5–4.5)
PLATELET # BLD AUTO: 253 K/UL — SIGNIFICANT CHANGE UP (ref 150–400)
POTASSIUM SERPL-MCNC: 4.3 MMOL/L — SIGNIFICANT CHANGE UP (ref 3.5–5.3)
POTASSIUM SERPL-SCNC: 4.3 MMOL/L — SIGNIFICANT CHANGE UP (ref 3.5–5.3)
PROT SERPL-MCNC: 7.5 G/DL — SIGNIFICANT CHANGE UP (ref 6–8.3)
PROTHROM AB SERPL-ACNC: 17.8 SEC — HIGH (ref 9.8–12.7)
PROTHROM AB SERPL-ACNC: 26.3 SEC — HIGH (ref 9.8–12.7)
RBC # BLD: 3.3 M/UL — LOW (ref 4.2–5.8)
RBC # FLD: 18.8 % — HIGH (ref 10.3–14.5)
SODIUM SERPL-SCNC: 135 MMOL/L — SIGNIFICANT CHANGE UP (ref 135–145)
WBC # BLD: 7.5 K/UL — SIGNIFICANT CHANGE UP (ref 3.8–10.5)
WBC # FLD AUTO: 7.5 K/UL — SIGNIFICANT CHANGE UP (ref 3.8–10.5)

## 2017-05-19 PROCEDURE — 76937 US GUIDE VASCULAR ACCESS: CPT

## 2017-05-19 PROCEDURE — 84443 ASSAY THYROID STIM HORMONE: CPT

## 2017-05-19 PROCEDURE — 85730 THROMBOPLASTIN TIME PARTIAL: CPT

## 2017-05-19 PROCEDURE — 86901 BLOOD TYPING SEROLOGIC RH(D): CPT

## 2017-05-19 PROCEDURE — 93010 ELECTROCARDIOGRAM REPORT: CPT

## 2017-05-19 PROCEDURE — 85610 PROTHROMBIN TIME: CPT

## 2017-05-19 PROCEDURE — 71250 CT THORAX DX C-: CPT

## 2017-05-19 PROCEDURE — 83880 ASSAY OF NATRIURETIC PEPTIDE: CPT

## 2017-05-19 PROCEDURE — 99285 EMERGENCY DEPT VISIT HI MDM: CPT | Mod: 25

## 2017-05-19 PROCEDURE — 82330 ASSAY OF CALCIUM: CPT

## 2017-05-19 PROCEDURE — 93306 TTE W/DOPPLER COMPLETE: CPT

## 2017-05-19 PROCEDURE — 74176 CT ABD & PELVIS W/O CONTRAST: CPT

## 2017-05-19 PROCEDURE — G0103: CPT

## 2017-05-19 PROCEDURE — 83036 HEMOGLOBIN GLYCOSYLATED A1C: CPT

## 2017-05-19 PROCEDURE — 71045 X-RAY EXAM CHEST 1 VIEW: CPT

## 2017-05-19 PROCEDURE — 86480 TB TEST CELL IMMUN MEASURE: CPT

## 2017-05-19 PROCEDURE — 93880 EXTRACRANIAL BILAT STUDY: CPT

## 2017-05-19 PROCEDURE — 83550 IRON BINDING TEST: CPT

## 2017-05-19 PROCEDURE — 96375 TX/PRO/DX INJ NEW DRUG ADDON: CPT

## 2017-05-19 PROCEDURE — 87486 CHLMYD PNEUM DNA AMP PROBE: CPT

## 2017-05-19 PROCEDURE — 99239 HOSP IP/OBS DSCHRG MGMT >30: CPT

## 2017-05-19 PROCEDURE — 83605 ASSAY OF LACTIC ACID: CPT

## 2017-05-19 PROCEDURE — 87340 HEPATITIS B SURFACE AG IA: CPT

## 2017-05-19 PROCEDURE — 86706 HEP B SURFACE ANTIBODY: CPT

## 2017-05-19 PROCEDURE — 86850 RBC ANTIBODY SCREEN: CPT

## 2017-05-19 PROCEDURE — 84100 ASSAY OF PHOSPHORUS: CPT

## 2017-05-19 PROCEDURE — 84132 ASSAY OF SERUM POTASSIUM: CPT

## 2017-05-19 PROCEDURE — 87040 BLOOD CULTURE FOR BACTERIA: CPT

## 2017-05-19 PROCEDURE — 87633 RESP VIRUS 12-25 TARGETS: CPT

## 2017-05-19 PROCEDURE — 82550 ASSAY OF CK (CPK): CPT

## 2017-05-19 PROCEDURE — 82435 ASSAY OF BLOOD CHLORIDE: CPT

## 2017-05-19 PROCEDURE — 85652 RBC SED RATE AUTOMATED: CPT

## 2017-05-19 PROCEDURE — 85014 HEMATOCRIT: CPT

## 2017-05-19 PROCEDURE — 84295 ASSAY OF SERUM SODIUM: CPT

## 2017-05-19 PROCEDURE — 84484 ASSAY OF TROPONIN QUANT: CPT

## 2017-05-19 PROCEDURE — 93005 ELECTROCARDIOGRAM TRACING: CPT

## 2017-05-19 PROCEDURE — 76705 ECHO EXAM OF ABDOMEN: CPT

## 2017-05-19 PROCEDURE — 86704 HEP B CORE ANTIBODY TOTAL: CPT

## 2017-05-19 PROCEDURE — 80307 DRUG TEST PRSMV CHEM ANLYZR: CPT

## 2017-05-19 PROCEDURE — 93308 TTE F-UP OR LMTD: CPT

## 2017-05-19 PROCEDURE — 84145 PROCALCITONIN (PCT): CPT

## 2017-05-19 PROCEDURE — 83615 LACTATE (LD) (LDH) ENZYME: CPT

## 2017-05-19 PROCEDURE — 76700 US EXAM ABDOM COMPLETE: CPT

## 2017-05-19 PROCEDURE — 85027 COMPLETE CBC AUTOMATED: CPT

## 2017-05-19 PROCEDURE — 86803 HEPATITIS C AB TEST: CPT

## 2017-05-19 PROCEDURE — 82553 CREATINE MB FRACTION: CPT

## 2017-05-19 PROCEDURE — 84156 ASSAY OF PROTEIN URINE: CPT

## 2017-05-19 PROCEDURE — 87798 DETECT AGENT NOS DNA AMP: CPT

## 2017-05-19 PROCEDURE — 84550 ASSAY OF BLOOD/URIC ACID: CPT

## 2017-05-19 PROCEDURE — 93923 UPR/LXTR ART STDY 3+ LVLS: CPT

## 2017-05-19 PROCEDURE — 83735 ASSAY OF MAGNESIUM: CPT

## 2017-05-19 PROCEDURE — C1751: CPT

## 2017-05-19 PROCEDURE — 86900 BLOOD TYPING SEROLOGIC ABO: CPT

## 2017-05-19 PROCEDURE — 96374 THER/PROPH/DIAG INJ IV PUSH: CPT

## 2017-05-19 PROCEDURE — 87086 URINE CULTURE/COLONY COUNT: CPT

## 2017-05-19 PROCEDURE — 36569 INSJ PICC 5 YR+ W/O IMAGING: CPT

## 2017-05-19 PROCEDURE — 36000 PLACE NEEDLE IN VEIN: CPT | Mod: RT,XU

## 2017-05-19 PROCEDURE — 82728 ASSAY OF FERRITIN: CPT

## 2017-05-19 PROCEDURE — 82803 BLOOD GASES ANY COMBINATION: CPT

## 2017-05-19 PROCEDURE — 87389 HIV-1 AG W/HIV-1&-2 AB AG IA: CPT

## 2017-05-19 PROCEDURE — 87581 M.PNEUMON DNA AMP PROBE: CPT

## 2017-05-19 PROCEDURE — 81001 URINALYSIS AUTO W/SCOPE: CPT

## 2017-05-19 PROCEDURE — 80053 COMPREHEN METABOLIC PANEL: CPT

## 2017-05-19 PROCEDURE — 84134 ASSAY OF PREALBUMIN: CPT

## 2017-05-19 PROCEDURE — 82947 ASSAY GLUCOSE BLOOD QUANT: CPT

## 2017-05-19 PROCEDURE — 90834 PSYTX W PT 45 MINUTES: CPT

## 2017-05-19 RX ORDER — METOPROLOL TARTRATE 50 MG
12.5 TABLET ORAL
Qty: 0 | Refills: 0 | COMMUNITY

## 2017-05-19 RX ORDER — MILRINONE LACTATE 1 MG/ML
0.3 INJECTION, SOLUTION INTRAVENOUS
Qty: 0 | Refills: 0 | COMMUNITY
Start: 2017-05-19

## 2017-05-19 RX ORDER — METOPROLOL TARTRATE 50 MG
1 TABLET ORAL
Qty: 30 | Refills: 0 | OUTPATIENT
Start: 2017-05-19 | End: 2017-06-18

## 2017-05-19 RX ADMIN — MEXILETINE HYDROCHLORIDE 150 MILLIGRAM(S): 150 CAPSULE ORAL at 06:25

## 2017-05-19 RX ADMIN — MEXILETINE HYDROCHLORIDE 150 MILLIGRAM(S): 150 CAPSULE ORAL at 13:21

## 2017-05-19 RX ADMIN — RIVAROXABAN 20 MILLIGRAM(S): KIT at 12:31

## 2017-05-19 RX ADMIN — Medication 60 MILLIGRAM(S): at 13:15

## 2017-05-19 RX ADMIN — PANTOPRAZOLE SODIUM 40 MILLIGRAM(S): 20 TABLET, DELAYED RELEASE ORAL at 12:30

## 2017-05-19 RX ADMIN — MILRINONE LACTATE 6.77 MICROGRAM(S)/KG/MIN: 1 INJECTION, SOLUTION INTRAVENOUS at 06:24

## 2017-05-19 RX ADMIN — Medication 25 MILLIGRAM(S): at 17:16

## 2017-05-20 ENCOUNTER — MEDICATION RENEWAL (OUTPATIENT)
Age: 67
End: 2017-05-20

## 2017-05-25 ENCOUNTER — APPOINTMENT (OUTPATIENT)
Dept: CARDIOLOGY | Facility: CLINIC | Age: 67
End: 2017-05-25

## 2017-05-25 ENCOUNTER — NON-APPOINTMENT (OUTPATIENT)
Age: 67
End: 2017-05-25

## 2017-05-25 VITALS
HEART RATE: 98 BPM | SYSTOLIC BLOOD PRESSURE: 94 MMHG | WEIGHT: 141 LBS | OXYGEN SATURATION: 100 % | HEIGHT: 68 IN | BODY MASS INDEX: 21.37 KG/M2 | DIASTOLIC BLOOD PRESSURE: 70 MMHG

## 2017-06-01 ENCOUNTER — OTHER (OUTPATIENT)
Age: 67
End: 2017-06-01

## 2017-06-01 ENCOUNTER — APPOINTMENT (OUTPATIENT)
Dept: CARDIOLOGY | Facility: CLINIC | Age: 67
End: 2017-06-01

## 2017-06-01 VITALS
RESPIRATION RATE: 18 BRPM | HEIGHT: 68 IN | WEIGHT: 141 LBS | SYSTOLIC BLOOD PRESSURE: 100 MMHG | BODY MASS INDEX: 21.37 KG/M2 | DIASTOLIC BLOOD PRESSURE: 72 MMHG | OXYGEN SATURATION: 100 %

## 2017-06-02 ENCOUNTER — EMERGENCY (EMERGENCY)
Facility: HOSPITAL | Age: 67
LOS: 1 days | Discharge: ANOTHER HOSPITAL | End: 2017-06-02
Attending: EMERGENCY MEDICINE | Admitting: EMERGENCY MEDICINE
Payer: COMMERCIAL

## 2017-06-02 ENCOUNTER — INPATIENT (INPATIENT)
Facility: HOSPITAL | Age: 67
LOS: 44 days | Discharge: HOME | DRG: 1 | End: 2017-07-17
Attending: THORACIC SURGERY (CARDIOTHORACIC VASCULAR SURGERY) | Admitting: INTERNAL MEDICINE
Payer: MEDICARE

## 2017-06-02 VITALS
DIASTOLIC BLOOD PRESSURE: 85 MMHG | OXYGEN SATURATION: 100 % | SYSTOLIC BLOOD PRESSURE: 111 MMHG | HEART RATE: 106 BPM | TEMPERATURE: 97 F | RESPIRATION RATE: 20 BRPM

## 2017-06-02 VITALS
HEART RATE: 49 BPM | RESPIRATION RATE: 19 BRPM | TEMPERATURE: 97 F | DIASTOLIC BLOOD PRESSURE: 99 MMHG | SYSTOLIC BLOOD PRESSURE: 111 MMHG | OXYGEN SATURATION: 100 %

## 2017-06-02 VITALS
RESPIRATION RATE: 26 BRPM | HEART RATE: 106 BPM | OXYGEN SATURATION: 100 % | SYSTOLIC BLOOD PRESSURE: 84 MMHG | TEMPERATURE: 98 F | DIASTOLIC BLOOD PRESSURE: 64 MMHG

## 2017-06-02 DIAGNOSIS — Z95.811 PRESENCE OF HEART ASSIST DEVICE: Chronic | ICD-10-CM

## 2017-06-02 DIAGNOSIS — I50.9 HEART FAILURE, UNSPECIFIED: ICD-10-CM

## 2017-06-02 LAB
ALBUMIN SERPL ELPH-MCNC: 3.6 G/DL — SIGNIFICANT CHANGE UP (ref 3.3–5)
ALP SERPL-CCNC: 84 U/L — SIGNIFICANT CHANGE UP (ref 40–120)
ALT FLD-CCNC: 22 U/L — SIGNIFICANT CHANGE UP (ref 4–41)
AST SERPL-CCNC: 34 U/L — SIGNIFICANT CHANGE UP (ref 4–40)
BASOPHILS # BLD AUTO: 0.02 K/UL — SIGNIFICANT CHANGE UP (ref 0–0.2)
BASOPHILS NFR BLD AUTO: 0.3 % — SIGNIFICANT CHANGE UP (ref 0–2)
BILIRUB SERPL-MCNC: 1.8 MG/DL — HIGH (ref 0.2–1.2)
BUN SERPL-MCNC: 46 MG/DL — HIGH (ref 7–23)
CALCIUM SERPL-MCNC: 9.2 MG/DL — SIGNIFICANT CHANGE UP (ref 8.4–10.5)
CHLORIDE SERPL-SCNC: 92 MMOL/L — LOW (ref 98–107)
CK SERPL-CCNC: 50 U/L — SIGNIFICANT CHANGE UP (ref 30–200)
CO2 SERPL-SCNC: 21 MMOL/L — LOW (ref 22–31)
CREAT SERPL-MCNC: 2.07 MG/DL — HIGH (ref 0.5–1.3)
EOSINOPHIL # BLD AUTO: 0.02 K/UL — SIGNIFICANT CHANGE UP (ref 0–0.5)
EOSINOPHIL NFR BLD AUTO: 0.3 % — SIGNIFICANT CHANGE UP (ref 0–6)
GLUCOSE SERPL-MCNC: 93 MG/DL — SIGNIFICANT CHANGE UP (ref 70–99)
HCT VFR BLD CALC: 29.8 % — LOW (ref 39–50)
HGB BLD-MCNC: 9.2 G/DL — LOW (ref 13–17)
IMM GRANULOCYTES NFR BLD AUTO: 0.2 % — SIGNIFICANT CHANGE UP (ref 0–1.5)
INR BLD: 4.36 — HIGH (ref 0.88–1.17)
LYMPHOCYTES # BLD AUTO: 1.51 K/UL — SIGNIFICANT CHANGE UP (ref 1–3.3)
LYMPHOCYTES # BLD AUTO: 25.3 % — SIGNIFICANT CHANGE UP (ref 13–44)
MCHC RBC-ENTMCNC: 25.9 PG — LOW (ref 27–34)
MCHC RBC-ENTMCNC: 30.9 % — LOW (ref 32–36)
MCV RBC AUTO: 83.9 FL — SIGNIFICANT CHANGE UP (ref 80–100)
MONOCYTES # BLD AUTO: 0.6 K/UL — SIGNIFICANT CHANGE UP (ref 0–0.9)
MONOCYTES NFR BLD AUTO: 10.1 % — SIGNIFICANT CHANGE UP (ref 2–14)
NEUTROPHILS # BLD AUTO: 3.8 K/UL — SIGNIFICANT CHANGE UP (ref 1.8–7.4)
NEUTROPHILS NFR BLD AUTO: 63.8 % — SIGNIFICANT CHANGE UP (ref 43–77)
NT-PROBNP SERPL-SCNC: 3424 PG/ML — SIGNIFICANT CHANGE UP
PLATELET # BLD AUTO: 432 K/UL — HIGH (ref 150–400)
PMV BLD: 10.5 FL — SIGNIFICANT CHANGE UP (ref 7–13)
POTASSIUM SERPL-MCNC: 4 MMOL/L — SIGNIFICANT CHANGE UP (ref 3.5–5.3)
POTASSIUM SERPL-SCNC: 4 MMOL/L — SIGNIFICANT CHANGE UP (ref 3.5–5.3)
PROT SERPL-MCNC: 7.6 G/DL — SIGNIFICANT CHANGE UP (ref 6–8.3)
PROTHROM AB SERPL-ACNC: 50.3 SEC — HIGH (ref 9.8–13.1)
RBC # BLD: 3.55 M/UL — LOW (ref 4.2–5.8)
RBC # FLD: 19.1 % — HIGH (ref 10.3–14.5)
SODIUM SERPL-SCNC: 133 MMOL/L — LOW (ref 135–145)
TROPONIN T SERPL-MCNC: 0.08 NG/ML — HIGH (ref 0–0.06)
TSH SERPL-MCNC: 3.04 UIU/ML — SIGNIFICANT CHANGE UP (ref 0.27–4.2)
WBC # BLD: 5.96 K/UL — SIGNIFICANT CHANGE UP (ref 3.8–10.5)
WBC # FLD AUTO: 5.96 K/UL — SIGNIFICANT CHANGE UP (ref 3.8–10.5)

## 2017-06-02 PROCEDURE — 71010: CPT | Mod: 26

## 2017-06-02 PROCEDURE — 99285 EMERGENCY DEPT VISIT HI MDM: CPT | Mod: 25,GC

## 2017-06-02 PROCEDURE — 99284 EMERGENCY DEPT VISIT MOD MDM: CPT | Mod: GC

## 2017-06-02 PROCEDURE — 93010 ELECTROCARDIOGRAM REPORT: CPT

## 2017-06-02 RX ORDER — MILRINONE LACTATE 1 MG/ML
0.32 INJECTION, SOLUTION INTRAVENOUS
Qty: 20 | Refills: 0 | Status: DISCONTINUED | OUTPATIENT
Start: 2017-06-02 | End: 2017-06-06

## 2017-06-02 NOTE — ED PROVIDER NOTE - MEDICAL DECISION MAKING DETAILS
68 y/o M hx of chf , htn, vfib s/p aicd transfer from [facility] for LVAD insertion.Pt hemodyn stable in no acute distress. will  c/w milrione as per cards and admit

## 2017-06-02 NOTE — ED ADULT NURSE NOTE - PSH
AICD (Automatic Cardioverter/Defibrillator) Present  [employer] with 1 [employer] lead4/1/09- explanted and replaced with Packbacktronic 2 leads on 9/2/09  History of Prior Ablation Treatment  for afib  Status post left hip replacement

## 2017-06-02 NOTE — ED PROVIDER NOTE - ATTENDING CONTRIBUTION TO CARE
------------ATTENDING NOTE------------   68 yo M transferred for hypotension, intermittent tachycardia, related to severe cardiac dysfunction, planned for LVAD evaluation, had small amts nausea w/ nbnb this AM (now resolved, w/ soft benign abd, tolerating PO), no current CP or SOB above baseline, admitted, reviewed [facility] w/u.  - Edil Archer MD   -------------------------------------------------------------------------------------

## 2017-06-02 NOTE — ED PROVIDER NOTE - PSH
AICD (Automatic Cardioverter/Defibrillator) Present  [employer] Adrian with 1 [employer] lead4/1/09- explanted and replaced with MATINAS BIOPHARMAtronic 2 leads on 9/2/09  History of Prior Ablation Treatment  for afib  Status post left hip replacement

## 2017-06-02 NOTE — ED PROVIDER NOTE - PSH
AICD (Automatic Cardioverter/Defibrillator) Present  [employer] with 1 [employer] lead4/1/09- explanted and replaced with Avtozapertronic 2 leads on 9/2/09  History of Prior Ablation Treatment  for afib  Status post left hip replacement AICD (Automatic Cardioverter/Defibrillator) Present  [employer] with 1 [employer] lead4/1/09- explanted and replaced with Tourjivetronic 2 leads on 9/2/09  History of Prior Ablation Treatment  for afib  Status post left hip replacement

## 2017-06-02 NOTE — ED PROVIDER NOTE - CARE PLAN
Principal Discharge DX:	CHF (congestive heart failure) Principal Discharge DX:	CHF (congestive heart failure)  Secondary Diagnosis:	Hypotension

## 2017-06-02 NOTE — ED PROVIDER NOTE - OBJECTIVE STATEMENT
66 y/o M w/ hx CHF (EF 20%), PAF on xarelto pw vomiting.  Pt became lightheaded and vomit x 3.  Felt better after.  Notes going for extended walk in AM, w/ frequent rests 2/2 to weakness.  Denies CP, fever, cough, Ap, d/c.  Pt currently undergoing evaluation for LVAD fit.

## 2017-06-02 NOTE — ED ADULT NURSE NOTE - CHIEF COMPLAINT QUOTE
patient c/o SOB and vomited x3 and now feels better. patient has h/o cardiac with Afib, CHF  and defibrillator and being evaluated for LVAD. denies pain now. patient received 500 ml NS by EMS IV to left arm by EMS. PICC line to right arm patient was hypotensive at the scene

## 2017-06-02 NOTE — ED PROVIDER NOTE - ATTENDING CONTRIBUTION TO CARE
I performed a face-to-face evaluation of the patient and performed a history and physical examination. I agree with the history and physical examination.      67-year-old man with ejection fraction 20% on vasopressor drip at home went for a [id number] walk today and developed dizziness. A few hours later he found nauseated. SBP here 80-90, which is typical for him, but, when we set them up to walk him he felt immediately lightheaded. lemonade exam reveals mild crackles, no lower extremity edema. Will discuss with cardiology. Check labs.

## 2017-06-02 NOTE — ED ADULT NURSE NOTE - OBJECTIVE STATEMENT
68 y/o male arrived to ED transferred from [facility] for fitting of LVAD. Pt states that he went to [facility] for SOB after a walk. Pt states that he has been on milrinione 240 mg at 0.3 mcg/kg/hour since friday last week (infusion set changed on wedesday). Pt states they have discussed putting a "pump" in since then.  Pt arrives a&ox4, neg sob, neg chest pain, abd soft nontender, motor sensoryx4, skin warm dry intact. Pt arrived with PICC in right upper arm with infusion running and 20g IV LFA from [facility]. Pt palpable pulse is irregular 40 BPM, CM sinus Tachycardia on monitor to 107. EKG performed. MD aware. Will continue to monitor.

## 2017-06-02 NOTE — ED ADULT NURSE REASSESSMENT NOTE - NS ED NURSE REASSESS COMMENT FT1
rec'd report from DIXON Wylie endorsing pt. to be transferred to [facility], report was given as per DIXON Wylie, awaiting ambulance. pt. a&ox3, in NAD, breathes with ease, with g20 saline lock on left hand with no ss of infiltration. denies any pain nor SOB. will continue to monitor

## 2017-06-02 NOTE — CONSULT NOTE ADULT - SUBJECTIVE AND OBJECTIVE BOX
CHIEF COMPLAINT: Dizziness and nausea    HISTORY OF PRESENT ILLNESS:  This is a 66y/o M with advanced NICM BiV HFrEF 15-20% on a stable dose of milrinone at 3mcg/kg/min via PICC s/p AICD ([employer]), PAF on AC and h/o VT who's presenting with dizziness and nausea. He says that he was doing well earlier in the day and felt like his usual self. Normally he only walks from his living room to the front steps and this afternoon he decided to walk around a city block, which is significantly longer than he normally walks for. A while after that he developed nausea and vomited NBNB 3x and felt dizzy. Additionally, he feels that he did not sleep well last night due to SOB and endorses some orthopnea, although he cannot quantify it. Otherwise he denies any other recent changes and reports taking his medications as prescribed.     Allergies    No Known Allergies    Intolerances    	    MEDICATIONS:                  PAST MEDICAL & SURGICAL HISTORY:  H/O prior ablation treatment: for Afib  Ventricular fibrillation: s/p AICD  PAF (paroxysmal atrial fibrillation): on xarelto  Non-Ischemic Cardiomyopathy  SVT (Supraventricular Tachycardia)  HTN  CHF (Congestive Heart Failure)  Status post left hip replacement  Hypertension  Hypertension  History of Prior Ablation Treatment: for afib  AICD (Automatic Cardioverter/Defibrillator) Present: [employer] with 1 [employer] lead4/1/09- explanted and replaced with Medtronic 2 leads on 9/2/09      FAMILY HISTORY:  No pertinent family history in first degree relatives      SOCIAL HISTORY:    [ ] Non-smoker  [ ] Smoker  [ ] Alcohol      REVIEW OF SYSTEMS:  See HPI, otherwise complete 10 point review of systems negative    [ ] All others negative	  [ ] Unable to obtain    PHYSICAL EXAM:  T(C): 36.7, Max: 36.7 (06-02 @ 17:08)  HR: 89 (89 - 106)  BP: 89/57 (84/53 - 89/57)  RR: 16 (16 - 26)  SpO2: 100% (99% - 100%)  Wt(kg): --  I&O's Summary      Appearance: No Acute Distress	  HEENT:  Normal oral mucosa, PERRL, EOMI	  Cardiovascular: Normal S1 S2, No JVD, No murmurs/rubs/gallops  Respiratory: Lungs clear to auscultation bilaterally  Gastrointestinal:  Soft, Non-tender, + BS	  Skin: No rashes, No ecchymoses, No cyanosis	  Neurologic: Non-focal  Extremities: No clubbing, cyanosis or edema  Vascular: Peripheral pulses palpable 2+ bilaterally  Psychiatry: A & O x 3, Mood & affect appropriate    LABS:	 	    CBC Full  -  ( 02 Jun 2017 17:42 )  WBC Count : 5.96 K/uL  Hemoglobin : 9.2 g/dL  Hematocrit : 29.8 %  Platelet Count - Automated : 432 K/uL  Mean Cell Volume : 83.9 fL  Mean Cell Hemoglobin : 25.9 pg  Mean Cell Hemoglobin Concentration : 30.9 %  Auto Neutrophil # : 3.80 K/uL  Auto Lymphocyte # : 1.51 K/uL  Auto Monocyte # : 0.60 K/uL  Auto Eosinophil # : 0.02 K/uL  Auto Basophil # : 0.02 K/uL  Auto Neutrophil % : 63.8 %  Auto Lymphocyte % : 25.3 %  Auto Monocyte % : 10.1 %  Auto Eosinophil % : 0.3 %  Auto Basophil % : 0.3 %    06-02    133<L>  |  92<L>  |  46<H>  ----------------------------<  93  4.0   |  21<L>  |  2.07<H>    Ca    9.2      02 Jun 2017 17:42    TPro  7.6  /  Alb  3.6  /  TBili  1.8<H>  /  DBili  x   /  AST  34  /  ALT  22  /  AlkPhos  84  06-02      proBNP: Serum Pro-Brain Natriuretic Peptide: 3424 pg/mL (06-02 @ 17:42)    Lipid Profile:   HgA1c:   TSH: Thyroid Stimulating Hormone, Serum: 3.04 uIU/mL (06-02 @ 17:42)        CARDIAC MARKERS:            TELEMETRY: 	    ECG:  	  RADIOLOGY:  OTHER: 	    PREVIOUS DIAGNOSTIC TESTING:    [ ] Echocardiogram:  [ ] Catheterization:  [ ] Stress Test: CHIEF COMPLAINT: Dizziness and nausea    HISTORY OF PRESENT ILLNESS:  This is a 66y/o M with advanced NICM BiV HFrEF 15-20% on a stable dose of milrinone at 3mcg/kg/min via PICC s/p AICD ([employer]), PAF on AC and h/o VT who's presenting with dizziness and nausea. He says that he was doing well earlier in the day and felt like his usual self. Normally he only walks from his living room to the front steps and this afternoon he decided to walk around a city block, which is significantly longer than he normally walks for. A while after that he developed nausea and vomited NBNB 3x and felt dizzy. Additionally, he feels that he did not sleep well last night due to SOB and endorses some orthopnea, although he cannot quantify it. Otherwise he denies any other recent changes and reports taking his medications as prescribed. He says that his normal BP is around 90 systolic.    Allergies    No Known Allergies    Intolerances    	    MEDICATIONS:    mexiletine 150 mg oral capsule  -- 3 times a day  -- Indication: For Ventricular tachycardia    rivaroxaban 20 mg oral tablet  -- 1 tab(s) by mouth once a day  -- Indication: For Paroxysmal atrial fibrillation    metoprolol succinate 25 mg oral tablet, extended release  -- 1 tab(s) by mouth once a day  -- Indication: For Acute on chronic combined systolic and diastolic heart failure    torsemide 20 mg oral tablet  -- 3 tab(s) by mouth every 12 hours  -- Indication: For Acute on chronic combined systolic and diastolic heart failure    milrinone  -- 0.3 mcg/kg/min intravenous   -- Indication: For Acute on chronic combined systolic and diastolic heart failure    senna oral tablet  -- 2 tab(s) by mouth once a day (at bedtime)  -- Indication: For laxative    docusate sodium 100 mg oral capsule  -- 1 cap(s) by mouth 3 times a day  -- Indication: For laxative    pantoprazole 40 mg oral delayed release tablet  -- 1 tab(s) by mouth 2 times a day (before meals)  -- Indication: For Stomach protectant    Centrum oral tablet  -- 1 tab(s) by mouth once a day  -- Indication: For Vitamin supplement  Oyster Shell Calcium with Vitamin D 500 mg-200 intl units oral tablet  -- 1 tab(s) by mouth once a day  -- Indication: For Supplement    folic acid 1 mg oral tablet  -- 1 tab(s) by mouth once a day  -- Indication: For Supplement    Vitamin C 500 mg oral tablet  -- 1 tab(s) by mouth once a day  -- Indication: For Supplement.    PAST MEDICAL & SURGICAL HISTORY:  H/O prior ablation treatment: for Afib  Ventricular fibrillation: s/p AICD  PAF (paroxysmal atrial fibrillation): on xarelto  Non-Ischemic Cardiomyopathy  SVT (Supraventricular Tachycardia)  HTN  CHF (Congestive Heart Failure)  Status post left hip replacement  Hypertension  Hypertension  History of Prior Ablation Treatment: for afib  AICD (Automatic Cardioverter/Defibrillator) Present: [employer] with 1 [employer] lead4/1/09- explanted and replaced with Medtronic 2 leads on 9/2/09      FAMILY HISTORY:  No pertinent family history in first degree relatives      SOCIAL HISTORY:    from home    REVIEW OF SYSTEMS:  See HPI, otherwise complete 10 point review of systems negative    [ ] All others negative	      PHYSICAL EXAM:  T(C): 36.7, Max: 36.7 (06-02 @ 17:08)  HR: 89 (89 - 106)  BP: 89/57 (84/53 - 89/57)  RR: 16 (16 - 26)  SpO2: 100% (99% - 100%)  Wt(kg): --  I&O's Summary      Appearance: No Acute Distress	  HEENT:  Normal oral mucosa 	  Cardiovascular: Normal S1 S2, JVP to angle of jaw, No murmurs/rubs/gallops  Respiratory: Lungs clear to auscultation bilaterally  Gastrointestinal:  Soft, Non-tender, + BS	  Skin: No cyanosis	  Neurologic: Non-focal  Extremities: No clubbing, cyanosis or edema  Vascular: Peripheral pulses palpable 2+ bilaterally  Psychiatry: A & O x 3, Mood & affect appropriate    LABS:	 	    CBC Full  -  ( 02 Jun 2017 17:42 )  WBC Count : 5.96 K/uL  Hemoglobin : 9.2 g/dL  Hematocrit : 29.8 %  Platelet Count - Automated : 432 K/uL  Mean Cell Volume : 83.9 fL  Mean Cell Hemoglobin : 25.9 pg  Mean Cell Hemoglobin Concentration : 30.9 %  Auto Neutrophil # : 3.80 K/uL  Auto Lymphocyte # : 1.51 K/uL  Auto Monocyte # : 0.60 K/uL  Auto Eosinophil # : 0.02 K/uL  Auto Basophil # : 0.02 K/uL  Auto Neutrophil % : 63.8 %  Auto Lymphocyte % : 25.3 %  Auto Monocyte % : 10.1 %  Auto Eosinophil % : 0.3 %  Auto Basophil % : 0.3 %    06-02    133<L>  |  92<L>  |  46<H>  ----------------------------<  93  4.0   |  21<L>  |  2.07<H>    Ca    9.2      02 Jun 2017 17:42    TPro  7.6  /  Alb  3.6  /  TBili  1.8<H>  /  DBili  x   /  AST  34  /  ALT  22  /  AlkPhos  84  06-02      proBNP: Serum Pro-Brain Natriuretic Peptide: 3424 pg/mL (06-02 @ 17:42)      TSH: Thyroid Stimulating Hormone, Serum: 3.04 uIU/mL (06-02 @ 17:42)        CARDIAC MARKERS:    Troponin T, Serum (06.02.17 @ 17:42)    Troponin T, Serum: 0.08: Delta: < 0.06 on 04/29/    	    ECG:  	  RADIOLOGY:  OTHER: 	    PREVIOUS DIAGNOSTIC TESTING:    [ ] Echocardiogram:  [ ] Catheterization:  [ ] Stress Test:

## 2017-06-02 NOTE — CONSULT NOTE ADULT - ASSESSMENT
This is a 68y/o M with advanced NICM BiV HFrEF 15-20% on a stable dose of milrinone at 3mcg/kg/min via PICC s/p AICD ([employer]), PAF on AC and h/o VT who's presenting with dizziness and nausea.  - Likely increased cardiac demand and unable to augment CO given prolonged exertion and fixed dose of milrinone  - Elevated JVP on exam, would convert PO to IV diuretics and c/w other home Rx for now; repeat labs, including D Bili and lactate  - Elevated Cr could be secondary to decreased CO, less likely congestion as LFTs stable; monitor and would check urine studies  - D/w CHF attg and plan to transfer to [facility] as LVAD work up and advanced support not available at [facility]

## 2017-06-02 NOTE — ED PROVIDER NOTE - OBJECTIVE STATEMENT
68 y/o M hx of chf , htn, vfib s/p aicd transfer from [facility] for LVAD insertion. Pt currently denies any cp,sob, ab pain, n/v/d or urinary sx.rest of ros is neg.

## 2017-06-03 DIAGNOSIS — E80.6 OTHER DISORDERS OF BILIRUBIN METABOLISM: ICD-10-CM

## 2017-06-03 DIAGNOSIS — I48.0 PAROXYSMAL ATRIAL FIBRILLATION: ICD-10-CM

## 2017-06-03 DIAGNOSIS — I50.9 HEART FAILURE, UNSPECIFIED: ICD-10-CM

## 2017-06-03 DIAGNOSIS — N17.9 ACUTE KIDNEY FAILURE, UNSPECIFIED: ICD-10-CM

## 2017-06-03 DIAGNOSIS — Z29.9 ENCOUNTER FOR PROPHYLACTIC MEASURES, UNSPECIFIED: ICD-10-CM

## 2017-06-03 DIAGNOSIS — I49.9 CARDIAC ARRHYTHMIA, UNSPECIFIED: ICD-10-CM

## 2017-06-03 LAB
ALBUMIN SERPL ELPH-MCNC: 3.2 G/DL — LOW (ref 3.3–5)
ALP SERPL-CCNC: 85 U/L — SIGNIFICANT CHANGE UP (ref 40–120)
ALT FLD-CCNC: 22 U/L RC — SIGNIFICANT CHANGE UP (ref 10–45)
ANION GAP SERPL CALC-SCNC: 18 MMOL/L — HIGH (ref 5–17)
ANION GAP SERPL CALC-SCNC: 19 MMOL/L — HIGH (ref 5–17)
APPEARANCE UR: CLEAR — SIGNIFICANT CHANGE UP
AST SERPL-CCNC: 32 U/L — SIGNIFICANT CHANGE UP (ref 10–40)
BACTERIA # UR AUTO: NEGATIVE — SIGNIFICANT CHANGE UP
BILIRUB DIRECT SERPL-MCNC: 0.9 MG/DL — HIGH (ref 0–0.2)
BILIRUB SERPL-MCNC: 1.7 MG/DL — HIGH (ref 0.2–1.2)
BILIRUB UR-MCNC: NEGATIVE — SIGNIFICANT CHANGE UP
BUN SERPL-MCNC: 49 MG/DL — HIGH (ref 7–23)
BUN SERPL-MCNC: 51 MG/DL — HIGH (ref 7–23)
CALCIUM SERPL-MCNC: 9.1 MG/DL — SIGNIFICANT CHANGE UP (ref 8.4–10.5)
CALCIUM SERPL-MCNC: 9.5 MG/DL — SIGNIFICANT CHANGE UP (ref 8.4–10.5)
CHLORIDE SERPL-SCNC: 94 MMOL/L — LOW (ref 96–108)
CHLORIDE SERPL-SCNC: 95 MMOL/L — LOW (ref 96–108)
CK MB CFR SERPL CALC: 1.8 NG/ML — SIGNIFICANT CHANGE UP (ref 0–6.7)
CK SERPL-CCNC: 49 U/L — SIGNIFICANT CHANGE UP (ref 30–200)
CO2 SERPL-SCNC: 17 MMOL/L — LOW (ref 22–31)
CO2 SERPL-SCNC: 21 MMOL/L — LOW (ref 22–31)
COLOR SPEC: ABNORMAL
CREAT ?TM UR-MCNC: 131 MG/DL — SIGNIFICANT CHANGE UP
CREAT ?TM UR-MCNC: 132 MG/DL — SIGNIFICANT CHANGE UP
CREAT SERPL-MCNC: 1.92 MG/DL — HIGH (ref 0.5–1.3)
CREAT SERPL-MCNC: 1.99 MG/DL — HIGH (ref 0.5–1.3)
DIFF PNL FLD: NEGATIVE — SIGNIFICANT CHANGE UP
EPI CELLS # UR: 0 /HPF — SIGNIFICANT CHANGE UP (ref 0–5)
GLUCOSE SERPL-MCNC: 115 MG/DL — HIGH (ref 70–99)
GLUCOSE SERPL-MCNC: 89 MG/DL — SIGNIFICANT CHANGE UP (ref 70–99)
GLUCOSE UR QL: NEGATIVE MG/DL — SIGNIFICANT CHANGE UP
HYALINE CASTS # UR AUTO: 6 /LPF — SIGNIFICANT CHANGE UP (ref 0–7)
INR BLD: 3.8 RATIO — HIGH (ref 0.88–1.16)
KETONES UR-MCNC: NEGATIVE — SIGNIFICANT CHANGE UP
LEUKOCYTE ESTERASE UR-ACNC: NEGATIVE — SIGNIFICANT CHANGE UP
MAGNESIUM SERPL-MCNC: 2.3 MG/DL — SIGNIFICANT CHANGE UP (ref 1.6–2.6)
NITRITE UR-MCNC: NEGATIVE — SIGNIFICANT CHANGE UP
PH UR: 5 — SIGNIFICANT CHANGE UP (ref 5–8)
PHOSPHATE SERPL-MCNC: 4.7 MG/DL — HIGH (ref 2.5–4.5)
POTASSIUM SERPL-MCNC: 3.8 MMOL/L — SIGNIFICANT CHANGE UP (ref 3.5–5.3)
POTASSIUM SERPL-MCNC: 4.1 MMOL/L — SIGNIFICANT CHANGE UP (ref 3.5–5.3)
POTASSIUM SERPL-SCNC: 3.8 MMOL/L — SIGNIFICANT CHANGE UP (ref 3.5–5.3)
POTASSIUM SERPL-SCNC: 4.1 MMOL/L — SIGNIFICANT CHANGE UP (ref 3.5–5.3)
PROT ?TM UR-MCNC: 24 MG/DL — HIGH (ref 0–12)
PROT SERPL-MCNC: 7.1 G/DL — SIGNIFICANT CHANGE UP (ref 6–8.3)
PROT UR-MCNC: NEGATIVE MG/DL — SIGNIFICANT CHANGE UP
PROT/CREAT UR-RTO: 0.2 RATIO — SIGNIFICANT CHANGE UP (ref 0–0.2)
PROTHROM AB SERPL-ACNC: 42.6 SEC — HIGH (ref 9.8–12.7)
RBC CASTS # UR COMP ASSIST: 2 /HPF — SIGNIFICANT CHANGE UP (ref 0–4)
SODIUM SERPL-SCNC: 131 MMOL/L — LOW (ref 135–145)
SODIUM SERPL-SCNC: 133 MMOL/L — LOW (ref 135–145)
SODIUM UR-SCNC: <20 MMOL/L — SIGNIFICANT CHANGE UP
SP GR SPEC: 1.01 — SIGNIFICANT CHANGE UP (ref 1.01–1.02)
TROPONIN T SERPL-MCNC: 0.08 NG/ML — HIGH (ref 0–0.06)
UROBILINOGEN FLD QL: 1 MG/DL — SIGNIFICANT CHANGE UP
UUN UR-MCNC: 499 MG/DL — SIGNIFICANT CHANGE UP
WBC UR QL: 1 /HPF — SIGNIFICANT CHANGE UP (ref 0–5)

## 2017-06-03 PROCEDURE — 71010: CPT | Mod: 26

## 2017-06-03 PROCEDURE — 99223 1ST HOSP IP/OBS HIGH 75: CPT | Mod: GC

## 2017-06-03 RX ORDER — FUROSEMIDE 40 MG
40 TABLET ORAL DAILY
Qty: 0 | Refills: 0 | Status: DISCONTINUED | OUTPATIENT
Start: 2017-06-03 | End: 2017-06-03

## 2017-06-03 RX ORDER — FUROSEMIDE 40 MG
20 TABLET ORAL EVERY 8 HOURS
Qty: 0 | Refills: 0 | Status: DISCONTINUED | OUTPATIENT
Start: 2017-06-03 | End: 2017-06-05

## 2017-06-03 RX ORDER — METOPROLOL TARTRATE 50 MG
25 TABLET ORAL DAILY
Qty: 0 | Refills: 0 | Status: DISCONTINUED | OUTPATIENT
Start: 2017-06-03 | End: 2017-06-04

## 2017-06-03 RX ORDER — MEXILETINE HYDROCHLORIDE 150 MG/1
150 CAPSULE ORAL THREE TIMES A DAY
Qty: 0 | Refills: 0 | Status: DISCONTINUED | OUTPATIENT
Start: 2017-06-03 | End: 2017-06-12

## 2017-06-03 RX ORDER — MULTIVIT-MIN/FERROUS GLUCONATE 9 MG/15 ML
1 LIQUID (ML) ORAL DAILY
Qty: 0 | Refills: 0 | Status: DISCONTINUED | OUTPATIENT
Start: 2017-06-03 | End: 2017-06-12

## 2017-06-03 RX ORDER — RIVAROXABAN 15 MG-20MG
15 KIT ORAL DAILY
Qty: 0 | Refills: 0 | Status: DISCONTINUED | OUTPATIENT
Start: 2017-06-03 | End: 2017-06-07

## 2017-06-03 RX ORDER — DOCUSATE SODIUM 100 MG
100 CAPSULE ORAL THREE TIMES A DAY
Qty: 0 | Refills: 0 | Status: DISCONTINUED | OUTPATIENT
Start: 2017-06-03 | End: 2017-06-12

## 2017-06-03 RX ORDER — SENNA PLUS 8.6 MG/1
2 TABLET ORAL AT BEDTIME
Qty: 0 | Refills: 0 | Status: DISCONTINUED | OUTPATIENT
Start: 2017-06-03 | End: 2017-06-12

## 2017-06-03 RX ORDER — FOLIC ACID 0.8 MG
1 TABLET ORAL DAILY
Qty: 0 | Refills: 0 | Status: DISCONTINUED | OUTPATIENT
Start: 2017-06-03 | End: 2017-06-12

## 2017-06-03 RX ORDER — ASCORBIC ACID 60 MG
500 TABLET,CHEWABLE ORAL DAILY
Qty: 0 | Refills: 0 | Status: DISCONTINUED | OUTPATIENT
Start: 2017-06-03 | End: 2017-06-12

## 2017-06-03 RX ORDER — RIVAROXABAN 15 MG-20MG
20 KIT ORAL DAILY
Qty: 0 | Refills: 0 | Status: DISCONTINUED | OUTPATIENT
Start: 2017-06-03 | End: 2017-06-03

## 2017-06-03 RX ADMIN — Medication 100 MILLIGRAM(S): at 21:22

## 2017-06-03 RX ADMIN — Medication 20 MILLIGRAM(S): at 21:23

## 2017-06-03 RX ADMIN — MEXILETINE HYDROCHLORIDE 150 MILLIGRAM(S): 150 CAPSULE ORAL at 21:22

## 2017-06-03 RX ADMIN — SENNA PLUS 2 TABLET(S): 8.6 TABLET ORAL at 21:22

## 2017-06-03 RX ADMIN — Medication 1 TABLET(S): at 17:08

## 2017-06-03 RX ADMIN — Medication 1 MILLIGRAM(S): at 12:11

## 2017-06-03 RX ADMIN — RIVAROXABAN 15 MILLIGRAM(S): KIT at 12:11

## 2017-06-03 RX ADMIN — Medication 40 MILLIGRAM(S): at 04:30

## 2017-06-03 RX ADMIN — Medication 1 TABLET(S): at 12:11

## 2017-06-03 RX ADMIN — MILRINONE LACTATE 6.8 MICROGRAM(S)/KG/MIN: 1 INJECTION, SOLUTION INTRAVENOUS at 12:11

## 2017-06-03 RX ADMIN — MEXILETINE HYDROCHLORIDE 150 MILLIGRAM(S): 150 CAPSULE ORAL at 14:26

## 2017-06-03 RX ADMIN — Medication 500 MILLIGRAM(S): at 12:11

## 2017-06-03 RX ADMIN — MEXILETINE HYDROCHLORIDE 150 MILLIGRAM(S): 150 CAPSULE ORAL at 04:40

## 2017-06-03 RX ADMIN — MILRINONE LACTATE 6.8 MICROGRAM(S)/KG/MIN: 1 INJECTION, SOLUTION INTRAVENOUS at 02:09

## 2017-06-03 NOTE — ED ADULT NURSE REASSESSMENT NOTE - NS ED NURSE REASSESS COMMENT FT1
Pt home milrinone drip stopped, hospital dose initiated as ordered. Remains running through PICC line as per MD order. Safety and comfort maintained, pt hypotensive, MD aware, no intervention at this time.

## 2017-06-03 NOTE — H&P ADULT. - NEUROLOGICAL DETAILS
sensation intact/cranial nerves intact/alert and oriented x 3/normal strength/responds to verbal commands/responds to pain

## 2017-06-03 NOTE — H&P ADULT. - LAB RESULTS AND INTERPRETATION
Elevated proBNP, elevated troponin   Elevated Cr 2.07 pro bnp 3424, Trop T 0.08, CK 50, hyponatremia to 133 (from 135 two weeks prior), INR elevated at 4.36, Cr 2.07 from BL 0.9-1, BR 1.8.

## 2017-06-03 NOTE — H&P ADULT. - RS GEN PE MLT RESP DETAILS PC
respirations non-labored/airway patent/no rales/no wheezes/no rhonchi/breath sounds equal/clear to auscultation bilaterally

## 2017-06-03 NOTE — H&P ADULT. - PSH
AICD (Automatic Cardioverter/Defibrillator) Present  [employer] with 1 [employer] lead4/1/09- explanted and replaced with [employer]tronic 2 leads on 9/2/09  History of Prior Ablation Treatment  for afib  Status post left hip replacement AICD (Automatic Cardioverter/Defibrillator) Present  [employer] with 1 [employer] lead4/1/09- explanted and replaced with [employer] 2 leads on 9/2/09  History of Prior Ablation Treatment  for afib  Status post left hip replacement

## 2017-06-03 NOTE — H&P ADULT. - HISTORY OF PRESENT ILLNESS
68y/o M with advanced NICM BiV HFrEF 15-20% on a stable dose of milrinone at 3mcg/kg/min via PICC s/p AICD, with replacement from [employer] to Medtronic (2009), PAF on AC and h/o VT, with recent admission for AICD firing, now presenting with dizziness and nausea X 1 Day. He says that he was doing well earlier in the day and felt like his usual self. Normally he only walks from his living room to the front steps and this afternoon he decided to walk around a city block, which is significantly longer than he normally walks for. A while after that he developed nausea and vomited NBNB 3x and felt dizzy. Additionally, he feels that he did not sleep well last night due to SOB and endorses some orthopnea, although he cannot quantify it. ROS otherwise negative for f/c/ns/cp/abd p/diarrhea/consitpation; no changes in medication, patient is taking them as prescribed. He says that his normal BP is around 90 systolic.    Patient initially presented to [facility], seen by cardiology. Labs noteable for pro bnp 3424, Trop T 0.08, CK 50, hyponatremia to 133(from 135 2 weeks prior), INR elevated at 4.36, Cr 2.07 from BL 0.9-1. 68y/o M with advanced NICM BiV HFrEF 15-20% on a stable dose of milrinone at 3mcg/kg/min via PICC s/p AICD, with replacement from [employer] to Medtronic (2009), PAF on AC and h/o VT, with recent admission for AICD firing, now presenting with dizziness and nausea X 1 Day. He says that he was doing well earlier in the day and felt like his usual self. Normally he only walks from his living room to the front steps and this afternoon he decided to walk around a city block, which is significantly longer than he normally walks for. A while after that he developed nausea and vomited NBNB 3x and felt dizzy. ROS otherwise negative for f/c/ns/cp/abd p/diarrhea/consitpation, no syncope; no changes in medication, taking as prescribed. He says that his normal BP is around 90 systolic.    Patient initially presented to [facility], seen by cardiology. Labs noteable for pro bnp 3424, Trop T 0.08, CK 50, hyponatremia to 133 (from 135 two weeks prior), INR elevated at 4.36, Cr 2.07 from BL 0.9-1. EKG showing NSR with first degree block. 68y/o M with advanced NICM BiV HFrEF 15-20% on a stable dose of milrinone at 3mcg/kg/min via PICC s/p AICD, with replacement from [employer] to Medtronic (2009), PAF on AC and h/o VT, with recent admission for AICD firing, now presenting with dizziness and nausea/vomiting X 1 Day. He says that he was doing well earlier in the day and felt like his usual self. Normally he only walks from his living room to the front steps and this afternoon he decided to walk around a city block, which is significantly longer than he normally walks for. A while after that he developed nausea and vomited NBNB 3x and felt dizzy. ROS otherwise negative for f/c/ns/cp/abd p/diarrhea/consitpation, no syncope; no changes in medication, taking as prescribed. He says that his normal BP is around 90 systolic.    Patient initially presented to [facility], seen by cardiology. Labs noteable for pro bnp 3424, Trop T 0.08, CK 50, hyponatremia to 133 (from 135 two weeks prior), INR elevated at 4.36, Cr 2.07 from BL 0.9-1, BR 1.8. EKG showing NSR with first degree block.

## 2017-06-03 NOTE — H&P ADULT. - NEGATIVE NEUROLOGICAL SYMPTOMS
no generalized seizures/no transient paralysis/no difficulty walking/no focal seizures/no weakness/no syncope/no confusion/no loss of sensation/no paresthesias

## 2017-06-03 NOTE — PROVIDER CONTACT NOTE (OTHER) - ASSESSMENT
PT is AO4. Primacor 20mg IV @ 6.8ml/hr. Pt due for lasix 40mg IVP. Pt is asymptomatic at this time PT is AO4. Primacor 20mg IV @ 6.8ml/hr. Pt due for lasix 40mg IVP. Pt is asymptomatic at this time. As per pt, pt states this is his normal BP takes lasix at home every night.

## 2017-06-03 NOTE — ED ADULT NURSE REASSESSMENT NOTE - NS ED NURSE REASSESS COMMENT FT1
Pt given to holding DIXON Camargo. Prior to transfer to [facility], inpt MD Jolie Meire contacted. Lasix and Toprol orders questioned due to pt hypotension. MD Meier states that pt should still receive meds. DIXON Camargo made aware.

## 2017-06-03 NOTE — H&P ADULT. - PROBLEM SELECTOR PLAN 4
Cont to monitor on tele  Has AICD Etiology congestive hepatopathy vs. biliary process  Will CTM CMP   Check Direct/Indirect   If continues to increase will touch base with GI

## 2017-06-03 NOTE — H&P ADULT. - ATTENDING COMMENTS
NIGHT HOSPITALIST:  Patient UNKNOWN to me previously, assigned to me at this point via the ER to admit this 68 y/o M--followed by Dr. Bryant diggs at [facility]--patient with a history of reported non-ischemic cardiomyopathy with a biventricular AICD in the setting of severely impaired LV function with 15% EF apparently on a home IV dose of milrinone at 3 mcg/kg/min via PICC, PAF on Coumadin with recent admissions for AICD triggered by VT episodes with the patient ambulating around a bloc with subsequent N/V and dizziness.  Patient apparently presented to [facility] and was transferred to [place] ER to ER for assessment for an LVAD.  Patient denies chest pain/pressure.  No dyspnea.  No abdominal pain, no red blood per rectum or melena.  No back pain, no tearing back pain.  No fever, no chills, no rigors.  No hematuria.  Remaining review of systems not contributory.  No prior tobacco, quit ethanol 5 years ago.  .  Physical exam with an elderly, cachectic, chronically ill appearing M.  Systolic to 90 to 110 torr   Afebrile. HEENT< PERRL< EOMI, neck supple, chest with decreased breath sounds at the bases.  cor s1 s2, AICD c/d/i.  Abdomen scaphoid soft, normal bowel sounds, nontender,   Ext with diffuse severe sarcopenia.  WBC 5.9.  hgb 9.2.  Platelets of 432.  INR 3.8.  Random glucose of 89.  Cr 1.9.  abl 32..  Troponin 0.08.  BNP 3424.  No U/A sent by ER.  Chest radiograph reviewed with cardiomegaly and AICD present.  EKG tracing reviewed with sinus tachycardia a t105 with 1 AV.  Transferred from [facility] to [place] for planned LVAD.  Patient agrees to continuation of  rivaroxaban as above for PAF.   Plan as per cardiology.  Patient's prognosis is poor.  Emotional support provided to patient.  Patient aware of course and agrees with plan/care as above.  Care reviewed with medical resident.  Care assumed by the DAY HOSPITALIST.

## 2017-06-03 NOTE — H&P ADULT. - ASSESSMENT
68y/o M with advanced NICM BiV HFrEF 15-20% on a stable dose of milrinone at 3mcg/kg/min via PICC s/p AICD, with replacement from [employer] to Medtronic (2009), PAF on AC and h/o VT, with recent admission for AICD firing, now presenting with dizziness and nausea X 1 Day.

## 2017-06-03 NOTE — PROVIDER CONTACT NOTE (CHANGE IN STATUS NOTIFICATION) - BACKGROUND
Pt. admitted w/ CHF exacerbation. On Primacor gtt and IV lasix. BMP was sent, NA + is 131 & K+ is 4.1. Pt. has an AICD.

## 2017-06-03 NOTE — H&P ADULT. - PROBLEM SELECTOR PLAN 2
Likely 2/2 decreased effective volume in setting of heart failure   Will check urine studies   CTM BMP  Keep BP up with IVF Likely 2/2 decreased effective volume in setting of heart failure   Will check urine studies   CTM BMP  To improve with management of ADHF

## 2017-06-03 NOTE — H&P ADULT. - PROBLEM SELECTOR PLAN 1
Patient admitted with likely decompensated HF 2/2 overexertion and increased cardiac demand with insufficient cardiac output   Continue Milrinone   Will continue diuresis, per cardiology recs, to convert 20 mg Torsemide to IV form   Hemodynamically stable   CTM on Tele    Cont Spironolactone  Cont Metoprolol Patient admitted with likely decompensated HF 2/2 overexertion and increased cardiac demand with insufficient cardiac output   Hemodynamically stable  Will continue diuresis, per cardiology recs, to convert 20 mg Torsemide to IV form   Cont Metoprolol  Cont Milrinone  CTM on Tele    Cards following, appreciate recs

## 2017-06-03 NOTE — H&P ADULT. - PROBLEM SELECTOR PLAN 6
FEN: DASH  DVT ppx: Rivaroxaban  Dispo: Pending further medical management, Full Code, PT/OT consulted

## 2017-06-04 LAB
ANION GAP SERPL CALC-SCNC: 19 MMOL/L — HIGH (ref 5–17)
BILIRUB DIRECT SERPL-MCNC: 0.7 MG/DL — HIGH (ref 0–0.2)
BILIRUB INDIRECT FLD-MCNC: 0.7 MG/DL — SIGNIFICANT CHANGE UP (ref 0.2–1)
BILIRUB SERPL-MCNC: 1.4 MG/DL — HIGH (ref 0.2–1.2)
BUN SERPL-MCNC: 48 MG/DL — HIGH (ref 7–23)
CALCIUM SERPL-MCNC: 9 MG/DL — SIGNIFICANT CHANGE UP (ref 8.4–10.5)
CHLORIDE SERPL-SCNC: 96 MMOL/L — SIGNIFICANT CHANGE UP (ref 96–108)
CO2 SERPL-SCNC: 19 MMOL/L — LOW (ref 22–31)
CREAT SERPL-MCNC: 1.61 MG/DL — HIGH (ref 0.5–1.3)
GLUCOSE SERPL-MCNC: 101 MG/DL — HIGH (ref 70–99)
HCT VFR BLD CALC: 27 % — LOW (ref 39–50)
HGB BLD-MCNC: 8.6 G/DL — LOW (ref 13–17)
MAGNESIUM SERPL-MCNC: 2.3 MG/DL — SIGNIFICANT CHANGE UP (ref 1.6–2.6)
MCHC RBC-ENTMCNC: 26.5 PG — LOW (ref 27–34)
MCHC RBC-ENTMCNC: 31.9 GM/DL — LOW (ref 32–36)
MCV RBC AUTO: 83.1 FL — SIGNIFICANT CHANGE UP (ref 80–100)
PHOSPHATE SERPL-MCNC: 3.7 MG/DL — SIGNIFICANT CHANGE UP (ref 2.5–4.5)
PLATELET # BLD AUTO: 431 K/UL — HIGH (ref 150–400)
POTASSIUM SERPL-MCNC: 4 MMOL/L — SIGNIFICANT CHANGE UP (ref 3.5–5.3)
POTASSIUM SERPL-SCNC: 4 MMOL/L — SIGNIFICANT CHANGE UP (ref 3.5–5.3)
RBC # BLD: 3.25 M/UL — LOW (ref 4.2–5.8)
RBC # FLD: 19.2 % — HIGH (ref 10.3–14.5)
SODIUM SERPL-SCNC: 134 MMOL/L — LOW (ref 135–145)
WBC # BLD: 5.91 K/UL — SIGNIFICANT CHANGE UP (ref 3.8–10.5)
WBC # FLD AUTO: 5.91 K/UL — SIGNIFICANT CHANGE UP (ref 3.8–10.5)

## 2017-06-04 PROCEDURE — 99223 1ST HOSP IP/OBS HIGH 75: CPT | Mod: GC

## 2017-06-04 RX ORDER — METOPROLOL TARTRATE 50 MG
50 TABLET ORAL DAILY
Qty: 0 | Refills: 0 | Status: DISCONTINUED | OUTPATIENT
Start: 2017-06-04 | End: 2017-06-05

## 2017-06-04 RX ADMIN — Medication 50 MILLIGRAM(S): at 11:43

## 2017-06-04 RX ADMIN — MEXILETINE HYDROCHLORIDE 150 MILLIGRAM(S): 150 CAPSULE ORAL at 05:09

## 2017-06-04 RX ADMIN — Medication 20 MILLIGRAM(S): at 05:09

## 2017-06-04 RX ADMIN — RIVAROXABAN 15 MILLIGRAM(S): KIT at 11:45

## 2017-06-04 RX ADMIN — Medication 20 MILLIGRAM(S): at 21:29

## 2017-06-04 RX ADMIN — Medication 1 TABLET(S): at 11:45

## 2017-06-04 RX ADMIN — MEXILETINE HYDROCHLORIDE 150 MILLIGRAM(S): 150 CAPSULE ORAL at 21:29

## 2017-06-04 RX ADMIN — Medication 500 MILLIGRAM(S): at 11:45

## 2017-06-04 RX ADMIN — Medication 20 MILLIGRAM(S): at 11:45

## 2017-06-04 RX ADMIN — MEXILETINE HYDROCHLORIDE 150 MILLIGRAM(S): 150 CAPSULE ORAL at 11:45

## 2017-06-04 RX ADMIN — Medication 25 MILLIGRAM(S): at 05:09

## 2017-06-04 RX ADMIN — Medication 1 TABLET(S): at 11:44

## 2017-06-04 RX ADMIN — Medication 100 MILLIGRAM(S): at 05:09

## 2017-06-04 RX ADMIN — Medication 1 MILLIGRAM(S): at 11:45

## 2017-06-04 NOTE — DIETITIAN INITIAL EVALUATION ADULT. - OTHER INFO
Pt seen for CHF diet education. Pt reports excellent po intake/appetite per baseline, recently experienced episodes of vomiting just PTA however attributes it to taking his medication on an empty stomach. Pt states UBW around 147-149 pounds, weighs himself daily; however currently 160.7 pounds per pt from "fluid weight." Pt denies N/V/diarrhea or constipation at this time, states last BM today. Denies food allergies, denies difficulty chewing/swallowing. Pt aware of CHF dietary guidelines, states his HHA has been very helpful in having him comply, and that he eliminated fast foods from his diet. Pt amenable to heart-healthy diet review and recommendations for easy and healthy meal prep when HHA is unavailable.

## 2017-06-04 NOTE — DIETITIAN INITIAL EVALUATION ADULT. - ORAL INTAKE PTA
Pt reports good po intake PTA. Pt lives alone but has HHA from 9am-1pm come to help prepare meals, grocery shop, and promote healthy eating. Typical meal intake for pt: cereal and milk at breakfast with banana; lunch and dinner typically a piece of bread and butter. When asked regarding protein intake pt states HHA does prepare him steamed fish with vegetables often. Pt does not cook on his own. Vitamin/mineral supplements PTA:  Centrum silver, folic acid, Vitamin C/good

## 2017-06-04 NOTE — DIETITIAN INITIAL EVALUATION ADULT. - NS AS NUTRI INTERV ED CONTENT
Purpose of the nutrition education/1) Reviewed heart-healthy dietary guidelines with pt, including limiting dietary sodium, saturated fats, fast-foods, and consuming adequate fruits and vegetables daily. Discussed tips for eating healthy while HHA is not available, recommended having frozen vegetables on hand to microwave as he wants for dinner or lunch. Instead of using Mrs. Apple which pt dislikes to try other herbal seasonings or garlic/onion powder. Heart-healthy nutrition therapy handout provided for review at pt's leisure./Recommended modifications

## 2017-06-04 NOTE — DIETITIAN INITIAL EVALUATION ADULT. - PROBLEM SELECTOR PLAN 4
Etiology congestive hepatopathy vs. biliary process  Will CTM CMP   Check Direct/Indirect   If continues to increase will touch base with GI

## 2017-06-04 NOTE — DIETITIAN INITIAL EVALUATION ADULT. - ENERGY NEEDS
ht: 67 inches. wt:  BMI: UBW: IBW: %IBW:  Other pertinent objective information: 67 year old male pt c NICM BiV HFrEF (15-20%) s/p AICD, PAF on xarelto admitted c acute decompensated heart failure. Plans to optimize current regimen and f/u with cardiology. Also noted c hyperbilirubinemia, per medical attending monitor for now as pt asymptomatic. +2 edema B/L foot, ankle, leg, +3 edema right leg, no pressure injuries. ht: 67 inches. wt: 160.7 pounds (current, standing, + edema see below). BMI: 25.2 kG/m2. UBW: 149 pounds. IBW: 148 pounds +/- 10%. %IBW: 109%  Other pertinent objective information: 67 year old male pt c NICM BiV HFrEF (15-20%) s/p AICD, PAF on xarelto admitted c acute decompensated heart failure. Plans to optimize current regimen and f/u with cardiology. Also noted c hyperbilirubinemia, per medical attending monitor for now as pt asymptomatic. +2 edema B/L foot, ankle, leg, +3 edema right leg, no pressure injuries.

## 2017-06-04 NOTE — DIETITIAN INITIAL EVALUATION ADULT. - PROBLEM SELECTOR PLAN 1
Patient admitted with likely decompensated HF 2/2 overexertion and increased cardiac demand with insufficient cardiac output   Hemodynamically stable  Will continue diuresis, per cardiology recs, to convert 20 mg Torsemide to IV form   Cont Metoprolol  Cont Milrinone  CTM on Tele    Cards following, appreciate recs

## 2017-06-04 NOTE — DIETITIAN INITIAL EVALUATION ADULT. - ADHERENCE
Pt states HHA has been instrumental in helping him to eat healthy. Pt no longer goes to eat chinese food or [employer] chicken; only eats food prepared by A. Pt states HHA uses [employer]ar Zechariah seasoning and prepares meals c low sodium. However pt dislikes Eduar Zechariah./good

## 2017-06-04 NOTE — DIETITIAN INITIAL EVALUATION ADULT. - PROBLEM SELECTOR PLAN 2
Likely 2/2 decreased effective volume in setting of heart failure   Will check urine studies   CTM BMP  To improve with management of ADHF

## 2017-06-05 LAB
ANION GAP SERPL CALC-SCNC: 19 MMOL/L — HIGH (ref 5–17)
BILIRUB DIRECT SERPL-MCNC: 0.7 MG/DL — HIGH (ref 0–0.2)
BILIRUB INDIRECT FLD-MCNC: 0.7 MG/DL — SIGNIFICANT CHANGE UP (ref 0.2–1)
BILIRUB SERPL-MCNC: 1.4 MG/DL — HIGH (ref 0.2–1.2)
BUN SERPL-MCNC: 45 MG/DL — HIGH (ref 7–23)
CALCIUM SERPL-MCNC: 9.1 MG/DL — SIGNIFICANT CHANGE UP (ref 8.4–10.5)
CHLORIDE SERPL-SCNC: 95 MMOL/L — LOW (ref 96–108)
CO2 SERPL-SCNC: 18 MMOL/L — LOW (ref 22–31)
CREAT SERPL-MCNC: 1.44 MG/DL — HIGH (ref 0.5–1.3)
GLUCOSE SERPL-MCNC: 86 MG/DL — SIGNIFICANT CHANGE UP (ref 70–99)
HCT VFR BLD CALC: 28.6 % — LOW (ref 39–50)
HGB BLD-MCNC: 8.8 G/DL — LOW (ref 13–17)
MAGNESIUM SERPL-MCNC: 2.4 MG/DL — SIGNIFICANT CHANGE UP (ref 1.6–2.6)
MCHC RBC-ENTMCNC: 25.8 PG — LOW (ref 27–34)
MCHC RBC-ENTMCNC: 30.8 GM/DL — LOW (ref 32–36)
MCV RBC AUTO: 83.9 FL — SIGNIFICANT CHANGE UP (ref 80–100)
PHOSPHATE SERPL-MCNC: 3.2 MG/DL — SIGNIFICANT CHANGE UP (ref 2.5–4.5)
PLATELET # BLD AUTO: 449 K/UL — HIGH (ref 150–400)
POTASSIUM SERPL-MCNC: 4.2 MMOL/L — SIGNIFICANT CHANGE UP (ref 3.5–5.3)
POTASSIUM SERPL-SCNC: 4.2 MMOL/L — SIGNIFICANT CHANGE UP (ref 3.5–5.3)
RBC # BLD: 3.41 M/UL — LOW (ref 4.2–5.8)
RBC # FLD: 19.2 % — HIGH (ref 10.3–14.5)
SODIUM SERPL-SCNC: 132 MMOL/L — LOW (ref 135–145)
WBC # BLD: 6.07 K/UL — SIGNIFICANT CHANGE UP (ref 3.8–10.5)
WBC # FLD AUTO: 6.07 K/UL — SIGNIFICANT CHANGE UP (ref 3.8–10.5)

## 2017-06-05 PROCEDURE — 99233 SBSQ HOSP IP/OBS HIGH 50: CPT

## 2017-06-05 PROCEDURE — 99233 SBSQ HOSP IP/OBS HIGH 50: CPT | Mod: GC

## 2017-06-05 PROCEDURE — 90832 PSYTX W PT 30 MINUTES: CPT

## 2017-06-05 RX ORDER — FUROSEMIDE 40 MG
10 TABLET ORAL
Qty: 500 | Refills: 0 | Status: DISCONTINUED | OUTPATIENT
Start: 2017-06-05 | End: 2017-06-06

## 2017-06-05 RX ORDER — METOPROLOL TARTRATE 50 MG
25 TABLET ORAL DAILY
Qty: 0 | Refills: 0 | Status: DISCONTINUED | OUTPATIENT
Start: 2017-06-05 | End: 2017-06-09

## 2017-06-05 RX ORDER — ONDANSETRON 8 MG/1
4 TABLET, FILM COATED ORAL EVERY 8 HOURS
Qty: 0 | Refills: 0 | Status: COMPLETED | OUTPATIENT
Start: 2017-06-05 | End: 2017-06-05

## 2017-06-05 RX ADMIN — Medication 1 MILLIGRAM(S): at 11:31

## 2017-06-05 RX ADMIN — Medication 50 MILLIGRAM(S): at 05:24

## 2017-06-05 RX ADMIN — Medication 1 TABLET(S): at 11:31

## 2017-06-05 RX ADMIN — MILRINONE LACTATE 6.8 MICROGRAM(S)/KG/MIN: 1 INJECTION, SOLUTION INTRAVENOUS at 11:31

## 2017-06-05 RX ADMIN — RIVAROXABAN 15 MILLIGRAM(S): KIT at 11:31

## 2017-06-05 RX ADMIN — MILRINONE LACTATE 6.8 MICROGRAM(S)/KG/MIN: 1 INJECTION, SOLUTION INTRAVENOUS at 20:47

## 2017-06-05 RX ADMIN — MILRINONE LACTATE 6.8 MICROGRAM(S)/KG/MIN: 1 INJECTION, SOLUTION INTRAVENOUS at 05:28

## 2017-06-05 RX ADMIN — MEXILETINE HYDROCHLORIDE 150 MILLIGRAM(S): 150 CAPSULE ORAL at 20:46

## 2017-06-05 RX ADMIN — Medication 5 MG/HR: at 19:00

## 2017-06-05 RX ADMIN — Medication 500 MILLIGRAM(S): at 11:31

## 2017-06-05 RX ADMIN — Medication 20 MILLIGRAM(S): at 05:17

## 2017-06-05 RX ADMIN — SENNA PLUS 2 TABLET(S): 8.6 TABLET ORAL at 20:46

## 2017-06-05 RX ADMIN — Medication 20 MILLIGRAM(S): at 14:20

## 2017-06-05 RX ADMIN — MEXILETINE HYDROCHLORIDE 150 MILLIGRAM(S): 150 CAPSULE ORAL at 05:17

## 2017-06-05 RX ADMIN — Medication 100 MILLIGRAM(S): at 20:46

## 2017-06-05 RX ADMIN — MEXILETINE HYDROCHLORIDE 150 MILLIGRAM(S): 150 CAPSULE ORAL at 14:20

## 2017-06-05 RX ADMIN — ONDANSETRON 4 MILLIGRAM(S): 8 TABLET, FILM COATED ORAL at 18:57

## 2017-06-05 NOTE — PHYSICAL THERAPY INITIAL EVALUATION ADULT - IMPAIRMENTS FOUND, PT EVAL
aerobic capacity/endurance ROM/gait, locomotion, and balance/aerobic capacity/endurance/muscle strength

## 2017-06-05 NOTE — PHYSICAL THERAPY INITIAL EVALUATION ADULT - PLANNED THERAPY INTERVENTIONS, PT EVAL
gait training/transfer training stair negotiation/strengthening/transfer training/gait training/balance training

## 2017-06-05 NOTE — PROVIDER CONTACT NOTE (CHANGE IN STATUS NOTIFICATION) - ASSESSMENT
asymptomatic. VSS
VSS, asymptomatic, no c/o from pt. Denies any chest pain or palpitations. BP; 105/52 ,   T 97.3 F oral, RR 18, sp02 96% RA.

## 2017-06-05 NOTE — PROVIDER CONTACT NOTE (CHANGE IN STATUS NOTIFICATION) - ACTION/TREATMENT ORDERED:
continue to monitor.
Will add on Mg to labs. Ok to give bedtime Lasix as ordered. Continue to monitor pt.

## 2017-06-05 NOTE — PHYSICAL THERAPY INITIAL EVALUATION ADULT - CRITERIA FOR SKILLED THERAPEUTIC INTERVENTIONS
impairments found/functional limitations in following categories impairments found/functional limitations in following categories/risk reduction/prevention

## 2017-06-06 LAB
ANION GAP SERPL CALC-SCNC: 17 MMOL/L — SIGNIFICANT CHANGE UP (ref 5–17)
ANION GAP SERPL CALC-SCNC: 21 MMOL/L — HIGH (ref 5–17)
BUN SERPL-MCNC: 51 MG/DL — HIGH (ref 7–23)
BUN SERPL-MCNC: 55 MG/DL — HIGH (ref 7–23)
CALCIUM SERPL-MCNC: 9.2 MG/DL — SIGNIFICANT CHANGE UP (ref 8.4–10.5)
CALCIUM SERPL-MCNC: 9.5 MG/DL — SIGNIFICANT CHANGE UP (ref 8.4–10.5)
CHLORIDE SERPL-SCNC: 93 MMOL/L — LOW (ref 96–108)
CHLORIDE SERPL-SCNC: 95 MMOL/L — LOW (ref 96–108)
CO2 SERPL-SCNC: 16 MMOL/L — LOW (ref 22–31)
CO2 SERPL-SCNC: 21 MMOL/L — LOW (ref 22–31)
CREAT SERPL-MCNC: 1.76 MG/DL — HIGH (ref 0.5–1.3)
CREAT SERPL-MCNC: 2.08 MG/DL — HIGH (ref 0.5–1.3)
GLUCOSE SERPL-MCNC: 221 MG/DL — HIGH (ref 70–99)
GLUCOSE SERPL-MCNC: 98 MG/DL — SIGNIFICANT CHANGE UP (ref 70–99)
MAGNESIUM SERPL-MCNC: 2.4 MG/DL — SIGNIFICANT CHANGE UP (ref 1.6–2.6)
PHOSPHATE SERPL-MCNC: 4.4 MG/DL — SIGNIFICANT CHANGE UP (ref 2.5–4.5)
POTASSIUM SERPL-MCNC: 4.6 MMOL/L — SIGNIFICANT CHANGE UP (ref 3.5–5.3)
POTASSIUM SERPL-MCNC: 4.9 MMOL/L — SIGNIFICANT CHANGE UP (ref 3.5–5.3)
POTASSIUM SERPL-SCNC: 4.6 MMOL/L — SIGNIFICANT CHANGE UP (ref 3.5–5.3)
POTASSIUM SERPL-SCNC: 4.9 MMOL/L — SIGNIFICANT CHANGE UP (ref 3.5–5.3)
SODIUM SERPL-SCNC: 130 MMOL/L — LOW (ref 135–145)
SODIUM SERPL-SCNC: 133 MMOL/L — LOW (ref 135–145)

## 2017-06-06 PROCEDURE — 71010: CPT | Mod: 26

## 2017-06-06 PROCEDURE — 99233 SBSQ HOSP IP/OBS HIGH 50: CPT | Mod: GC

## 2017-06-06 PROCEDURE — 99233 SBSQ HOSP IP/OBS HIGH 50: CPT

## 2017-06-06 RX ORDER — FUROSEMIDE 40 MG
10 TABLET ORAL
Qty: 500 | Refills: 0 | Status: DISCONTINUED | OUTPATIENT
Start: 2017-06-06 | End: 2017-06-12

## 2017-06-06 RX ORDER — MILRINONE LACTATE 1 MG/ML
0.6 INJECTION, SOLUTION INTRAVENOUS
Qty: 20 | Refills: 0 | Status: DISCONTINUED | OUTPATIENT
Start: 2017-06-06 | End: 2017-06-12

## 2017-06-06 RX ADMIN — MILRINONE LACTATE 6.8 MICROGRAM(S)/KG/MIN: 1 INJECTION, SOLUTION INTRAVENOUS at 11:23

## 2017-06-06 RX ADMIN — MEXILETINE HYDROCHLORIDE 150 MILLIGRAM(S): 150 CAPSULE ORAL at 14:17

## 2017-06-06 RX ADMIN — RIVAROXABAN 15 MILLIGRAM(S): KIT at 11:23

## 2017-06-06 RX ADMIN — MEXILETINE HYDROCHLORIDE 150 MILLIGRAM(S): 150 CAPSULE ORAL at 05:32

## 2017-06-06 RX ADMIN — Medication 25 MILLIGRAM(S): at 05:32

## 2017-06-06 RX ADMIN — Medication 7.5 MG/HR: at 23:15

## 2017-06-06 RX ADMIN — Medication 5 MG/HR: at 11:23

## 2017-06-06 RX ADMIN — Medication 1 TABLET(S): at 11:23

## 2017-06-06 RX ADMIN — Medication 500 MILLIGRAM(S): at 11:23

## 2017-06-06 RX ADMIN — MILRINONE LACTATE 11.25 MICROGRAM(S)/KG/MIN: 1 INJECTION, SOLUTION INTRAVENOUS at 22:19

## 2017-06-06 RX ADMIN — Medication 1 MILLIGRAM(S): at 11:23

## 2017-06-06 RX ADMIN — MEXILETINE HYDROCHLORIDE 150 MILLIGRAM(S): 150 CAPSULE ORAL at 22:18

## 2017-06-06 RX ADMIN — Medication 100 MILLIGRAM(S): at 05:32

## 2017-06-07 DIAGNOSIS — E87.70 FLUID OVERLOAD, UNSPECIFIED: ICD-10-CM

## 2017-06-07 DIAGNOSIS — R74.0 NONSPECIFIC ELEVATION OF LEVELS OF TRANSAMINASE AND LACTIC ACID DEHYDROGENASE [LDH]: ICD-10-CM

## 2017-06-07 DIAGNOSIS — N17.9 ACUTE KIDNEY FAILURE, UNSPECIFIED: ICD-10-CM

## 2017-06-07 DIAGNOSIS — R79.1 ABNORMAL COAGULATION PROFILE: ICD-10-CM

## 2017-06-07 DIAGNOSIS — R17 UNSPECIFIED JAUNDICE: ICD-10-CM

## 2017-06-07 DIAGNOSIS — E87.2 ACIDOSIS: ICD-10-CM

## 2017-06-07 LAB
ALBUMIN SERPL ELPH-MCNC: 3.4 G/DL — SIGNIFICANT CHANGE UP (ref 3.3–5)
ALBUMIN SERPL ELPH-MCNC: 3.7 G/DL — SIGNIFICANT CHANGE UP (ref 3.3–5)
ALP SERPL-CCNC: 100 U/L — SIGNIFICANT CHANGE UP (ref 40–120)
ALP SERPL-CCNC: 95 U/L — SIGNIFICANT CHANGE UP (ref 40–120)
ALP SERPL-CCNC: 98 U/L — SIGNIFICANT CHANGE UP (ref 40–120)
ALP SERPL-CCNC: 99 U/L — SIGNIFICANT CHANGE UP (ref 40–120)
ALT FLD-CCNC: 59 U/L RC — HIGH (ref 10–45)
ALT FLD-CCNC: 62 U/L RC — HIGH (ref 10–45)
ALT FLD-CCNC: 75 U/L RC — HIGH (ref 10–45)
ALT FLD-CCNC: 78 U/L RC — HIGH (ref 10–45)
ANION GAP SERPL CALC-SCNC: 17 MMOL/L — SIGNIFICANT CHANGE UP (ref 5–17)
ANION GAP SERPL CALC-SCNC: 18 MMOL/L — HIGH (ref 5–17)
APTT 50/50 2HOUR INCUB: 38.1 SEC — HIGH (ref 27.5–37.4)
APTT BLD: 31.5 SEC — SIGNIFICANT CHANGE UP (ref 27.5–37.4)
APTT BLD: 38.1 SEC — HIGH (ref 27.5–37.4)
APTT BLD: 38.1 SEC — HIGH (ref 27.5–37.4)
APTT BLD: 39.3 SEC — HIGH (ref 27.5–37.4)
APTT BLD: 41.7 SEC — HIGH (ref 27.5–37.4)
AST SERPL-CCNC: 117 U/L — HIGH (ref 10–40)
AST SERPL-CCNC: 127 U/L — HIGH (ref 10–40)
AST SERPL-CCNC: 88 U/L — HIGH (ref 10–40)
AST SERPL-CCNC: 99 U/L — HIGH (ref 10–40)
BASOPHILS # BLD AUTO: 0 K/UL — SIGNIFICANT CHANGE UP (ref 0–0.2)
BASOPHILS NFR BLD AUTO: 0.5 % — SIGNIFICANT CHANGE UP (ref 0–2)
BILIRUB SERPL-MCNC: 1.7 MG/DL — HIGH (ref 0.2–1.2)
BILIRUB SERPL-MCNC: 1.7 MG/DL — HIGH (ref 0.2–1.2)
BILIRUB SERPL-MCNC: 1.8 MG/DL — HIGH (ref 0.2–1.2)
BILIRUB SERPL-MCNC: 1.8 MG/DL — HIGH (ref 0.2–1.2)
BUN SERPL-MCNC: 56 MG/DL — HIGH (ref 7–23)
BUN SERPL-MCNC: 57 MG/DL — HIGH (ref 7–23)
CALCIUM SERPL-MCNC: 9 MG/DL — SIGNIFICANT CHANGE UP (ref 8.4–10.5)
CALCIUM SERPL-MCNC: 9 MG/DL — SIGNIFICANT CHANGE UP (ref 8.4–10.5)
CALCIUM SERPL-MCNC: 9.2 MG/DL — SIGNIFICANT CHANGE UP (ref 8.4–10.5)
CALCIUM SERPL-MCNC: 9.2 MG/DL — SIGNIFICANT CHANGE UP (ref 8.4–10.5)
CHLORIDE SERPL-SCNC: 91 MMOL/L — LOW (ref 96–108)
CHLORIDE SERPL-SCNC: 92 MMOL/L — LOW (ref 96–108)
CHLORIDE SERPL-SCNC: 94 MMOL/L — LOW (ref 96–108)
CHLORIDE SERPL-SCNC: 94 MMOL/L — LOW (ref 96–108)
CHOLEST SERPL-MCNC: 62 MG/DL — SIGNIFICANT CHANGE UP (ref 10–199)
CO2 SERPL-SCNC: 19 MMOL/L — LOW (ref 22–31)
CO2 SERPL-SCNC: 20 MMOL/L — LOW (ref 22–31)
CO2 SERPL-SCNC: 21 MMOL/L — LOW (ref 22–31)
CO2 SERPL-SCNC: 22 MMOL/L — SIGNIFICANT CHANGE UP (ref 22–31)
CREAT SERPL-MCNC: 2.06 MG/DL — HIGH (ref 0.5–1.3)
CREAT SERPL-MCNC: 2.06 MG/DL — HIGH (ref 0.5–1.3)
CREAT SERPL-MCNC: 2.08 MG/DL — HIGH (ref 0.5–1.3)
CREAT SERPL-MCNC: 2.15 MG/DL — HIGH (ref 0.5–1.3)
EOSINOPHIL # BLD AUTO: 0.1 K/UL — SIGNIFICANT CHANGE UP (ref 0–0.5)
EOSINOPHIL NFR BLD AUTO: 0.8 % — SIGNIFICANT CHANGE UP (ref 0–6)
FIBRINOGEN PPP-MCNC: 275 MG/DL — LOW (ref 310–510)
GLUCOSE SERPL-MCNC: 101 MG/DL — HIGH (ref 70–99)
GLUCOSE SERPL-MCNC: 110 MG/DL — HIGH (ref 70–99)
GLUCOSE SERPL-MCNC: 93 MG/DL — SIGNIFICANT CHANGE UP (ref 70–99)
GLUCOSE SERPL-MCNC: 97 MG/DL — SIGNIFICANT CHANGE UP (ref 70–99)
HBA1C BLD-MCNC: 5.5 % — SIGNIFICANT CHANGE UP (ref 4–5.6)
HCT VFR BLD CALC: 30.3 % — LOW (ref 39–50)
HDLC SERPL-MCNC: 17 MG/DL — LOW (ref 40–125)
HGB BLD-MCNC: 9.1 G/DL — LOW (ref 13–17)
INR BLD: 4.1 RATIO — HIGH (ref 0.88–1.16)
INR BLD: 5.89 RATIO — CRITICAL HIGH (ref 0.88–1.16)
INR BLD: 7.09 RATIO — CRITICAL HIGH (ref 0.88–1.16)
INR BLD: 7.16 RATIO — CRITICAL HIGH (ref 0.88–1.16)
LACTATE BLDV-MCNC: 2.1 MMOL/L — HIGH (ref 0.7–2)
LIPID PNL WITH DIRECT LDL SERPL: 38 MG/DL — SIGNIFICANT CHANGE UP
LYMPHOCYTES # BLD AUTO: 1.9 K/UL — SIGNIFICANT CHANGE UP (ref 1–3.3)
LYMPHOCYTES # BLD AUTO: 30.4 % — SIGNIFICANT CHANGE UP (ref 13–44)
MAGNESIUM SERPL-MCNC: 2.6 MG/DL — SIGNIFICANT CHANGE UP (ref 1.6–2.6)
MCHC RBC-ENTMCNC: 26.7 PG — LOW (ref 27–34)
MCHC RBC-ENTMCNC: 30.1 GM/DL — LOW (ref 32–36)
MCV RBC AUTO: 88.9 FL — SIGNIFICANT CHANGE UP (ref 80–100)
MONOCYTES # BLD AUTO: 1 K/UL — HIGH (ref 0–0.9)
MONOCYTES NFR BLD AUTO: 15.3 % — HIGH (ref 2–14)
NEUTROPHILS # BLD AUTO: 3.3 K/UL — SIGNIFICANT CHANGE UP (ref 1.8–7.4)
NEUTROPHILS NFR BLD AUTO: 53 % — SIGNIFICANT CHANGE UP (ref 43–77)
PAT CTL 2H: 37.2 SEC — SIGNIFICANT CHANGE UP (ref 27.5–37.4)
PHOSPHATE SERPL-MCNC: 4.6 MG/DL — HIGH (ref 2.5–4.5)
PHOSPHATE SERPL-MCNC: 4.7 MG/DL — HIGH (ref 2.5–4.5)
PHOSPHATE SERPL-MCNC: 4.8 MG/DL — HIGH (ref 2.5–4.5)
PLATELET # BLD AUTO: 391 K/UL — SIGNIFICANT CHANGE UP (ref 150–400)
POTASSIUM SERPL-MCNC: 4.3 MMOL/L — SIGNIFICANT CHANGE UP (ref 3.5–5.3)
POTASSIUM SERPL-MCNC: 4.3 MMOL/L — SIGNIFICANT CHANGE UP (ref 3.5–5.3)
POTASSIUM SERPL-MCNC: 4.5 MMOL/L — SIGNIFICANT CHANGE UP (ref 3.5–5.3)
POTASSIUM SERPL-MCNC: 4.6 MMOL/L — SIGNIFICANT CHANGE UP (ref 3.5–5.3)
POTASSIUM SERPL-SCNC: 4.3 MMOL/L — SIGNIFICANT CHANGE UP (ref 3.5–5.3)
POTASSIUM SERPL-SCNC: 4.3 MMOL/L — SIGNIFICANT CHANGE UP (ref 3.5–5.3)
POTASSIUM SERPL-SCNC: 4.5 MMOL/L — SIGNIFICANT CHANGE UP (ref 3.5–5.3)
POTASSIUM SERPL-SCNC: 4.6 MMOL/L — SIGNIFICANT CHANGE UP (ref 3.5–5.3)
PREALB SERPL-MCNC: 11 MG/DL — LOW (ref 18–45)
PROT SERPL-MCNC: 6.9 G/DL — SIGNIFICANT CHANGE UP (ref 6–8.3)
PROT SERPL-MCNC: 7 G/DL — SIGNIFICANT CHANGE UP (ref 6–8.3)
PROT SERPL-MCNC: 7.1 G/DL — SIGNIFICANT CHANGE UP (ref 6–8.3)
PROT SERPL-MCNC: 7.1 G/DL — SIGNIFICANT CHANGE UP (ref 6–8.3)
PROTHROM AB SERPL-ACNC: 45.6 SEC — HIGH (ref 9.8–12.7)
PROTHROM AB SERPL-ACNC: 65.9 SEC — HIGH (ref 9.8–12.7)
PROTHROM AB SERPL-ACNC: 80.4 SEC — HIGH (ref 9.8–12.7)
PROTHROM AB SERPL-ACNC: 81.2 SEC — HIGH (ref 9.8–12.7)
PT 100%: 65.9 SEC — HIGH (ref 9.8–12.7)
PT 50/50: 21.5 SEC — HIGH (ref 9.7–15.2)
RBC # BLD: 3.41 M/UL — LOW (ref 4.2–5.8)
RBC # FLD: 19.5 % — HIGH (ref 10.3–14.5)
SODIUM SERPL-SCNC: 130 MMOL/L — LOW (ref 135–145)
SODIUM SERPL-SCNC: 130 MMOL/L — LOW (ref 135–145)
SODIUM SERPL-SCNC: 131 MMOL/L — LOW (ref 135–145)
SODIUM SERPL-SCNC: 131 MMOL/L — LOW (ref 135–145)
THROMBIN TIME: 27.2 SECS — HIGH (ref 16–25)
TOTAL CHOLESTEROL/HDL RATIO MEASUREMENT: 3.6 RATIO — SIGNIFICANT CHANGE UP (ref 3.4–9.6)
TRIGL SERPL-MCNC: 33 MG/DL — SIGNIFICANT CHANGE UP (ref 10–149)
TSH SERPL-MCNC: 3.54 UIU/ML — SIGNIFICANT CHANGE UP (ref 0.27–4.2)
WBC # BLD: 6.3 K/UL — SIGNIFICANT CHANGE UP (ref 3.8–10.5)
WBC # FLD AUTO: 6.3 K/UL — SIGNIFICANT CHANGE UP (ref 3.8–10.5)

## 2017-06-07 PROCEDURE — 76700 US EXAM ABDOM COMPLETE: CPT | Mod: 26

## 2017-06-07 PROCEDURE — 99233 SBSQ HOSP IP/OBS HIGH 50: CPT

## 2017-06-07 PROCEDURE — 99223 1ST HOSP IP/OBS HIGH 75: CPT | Mod: GC

## 2017-06-07 PROCEDURE — 93010 ELECTROCARDIOGRAM REPORT: CPT

## 2017-06-07 PROCEDURE — 99233 SBSQ HOSP IP/OBS HIGH 50: CPT | Mod: GC

## 2017-06-07 RX ORDER — PROTHROMBIN COMPLEX CONCENTRATE (HUMAN) 25.5; 16.5; 24; 22; 22; 26 [IU]/ML; [IU]/ML; [IU]/ML; [IU]/ML; [IU]/ML; [IU]/ML
1500 POWDER, FOR SOLUTION INTRAVENOUS ONCE
Qty: 1500 | Refills: 0 | Status: COMPLETED | OUTPATIENT
Start: 2017-06-07 | End: 2017-06-07

## 2017-06-07 RX ORDER — PHYTONADIONE (VIT K1) 5 MG
5 TABLET ORAL ONCE
Qty: 0 | Refills: 0 | Status: COMPLETED | OUTPATIENT
Start: 2017-06-07 | End: 2017-06-07

## 2017-06-07 RX ORDER — PROTHROMBIN COMPLEX CONCENTRATE (HUMAN) 25.5; 16.5; 24; 22; 22; 26 [IU]/ML; [IU]/ML; [IU]/ML; [IU]/ML; [IU]/ML; [IU]/ML
2500 POWDER, FOR SOLUTION INTRAVENOUS ONCE
Qty: 2500 | Refills: 0 | Status: DISCONTINUED | OUTPATIENT
Start: 2017-06-07 | End: 2017-06-07

## 2017-06-07 RX ORDER — PHYTONADIONE (VIT K1) 5 MG
5 TABLET ORAL ONCE
Qty: 0 | Refills: 0 | Status: DISCONTINUED | OUTPATIENT
Start: 2017-06-07 | End: 2017-06-07

## 2017-06-07 RX ADMIN — MEXILETINE HYDROCHLORIDE 150 MILLIGRAM(S): 150 CAPSULE ORAL at 06:25

## 2017-06-07 RX ADMIN — MEXILETINE HYDROCHLORIDE 150 MILLIGRAM(S): 150 CAPSULE ORAL at 14:46

## 2017-06-07 RX ADMIN — Medication 101 MILLIGRAM(S): at 11:06

## 2017-06-07 RX ADMIN — Medication 30 MILLILITER(S): at 22:08

## 2017-06-07 RX ADMIN — Medication 1 TABLET(S): at 14:47

## 2017-06-07 RX ADMIN — MEXILETINE HYDROCHLORIDE 150 MILLIGRAM(S): 150 CAPSULE ORAL at 22:03

## 2017-06-07 RX ADMIN — Medication 500 MILLIGRAM(S): at 12:00

## 2017-06-07 RX ADMIN — PROTHROMBIN COMPLEX CONCENTRATE (HUMAN) 400 INTERNATIONAL UNIT(S): 25.5; 16.5; 24; 22; 22; 26 POWDER, FOR SOLUTION INTRAVENOUS at 15:33

## 2017-06-07 RX ADMIN — Medication 1 TABLET(S): at 12:41

## 2017-06-07 RX ADMIN — Medication 100 MILLIGRAM(S): at 14:46

## 2017-06-07 RX ADMIN — Medication 1 MILLIGRAM(S): at 12:01

## 2017-06-07 RX ADMIN — Medication 25 MILLIGRAM(S): at 12:01

## 2017-06-07 NOTE — PROGRESS NOTE ADULT - SUBJECTIVE AND OBJECTIVE BOX
Events: No acute events overnight.    MEDICATIONS  (STANDING):  mexiletine 150milliGRAM(s) Oral three times a day  docusate sodium 100milliGRAM(s) Oral three times a day  senna 2Tablet(s) Oral at bedtime  multivitamin/minerals 1Tablet(s) Oral daily  calcium carbonate  625 mG + Vitamin D (OsCal 250 + D) 1Tablet(s) Oral daily  folic acid 1milliGRAM(s) Oral daily  ascorbic acid 500milliGRAM(s) Oral daily  metoprolol succinate ER 25milliGRAM(s) Oral daily  furosemide Infusion 15mG/Hr IV Continuous <Continuous>  milrinone Infusion 0.5MICROgram(s)/kG/Min IV Continuous <Continuous>    MEDICATIONS  (PRN):  bisacodyl 5milliGRAM(s) Oral every 12 hours PRN Constipation      REVIEW OF SYSTEMS:  Otherwise, 10 point ROS done and otherwise negative.    PHYSICAL EXAM:  Vital Signs Last 24 Hrs  T(C): 36.7, Max: 36.7 (06-06 @ 21:20)  T(F): 98.1, Max: 98.1 (06-06 @ 21:20)  HR: 88 (88 - 96)  BP: 84/64 (72/55 - 99/56)  BP(mean): 69 (60 - 77)  RR: 27 (15 - 27)  SpO2: 100% (93% - 100%)  I&O's Summary    I & Os for current day (as of 07 Jun 2017 07:18)  =============================================  IN: 1150.8 ml / OUT: 800 ml / NET: 350.8 ml      Appearance: Normal	  HEENT:   Normal oral mucosa, PERRL, EOMI	  Lymphatic: No lymphadenopathy  Cardiovascular: Normal S1 S2, No JVD, No murmurs, No edema  Respiratory: Lungs clear to auscultation	  Psychiatry: A & O x 3, Mood & affect appropriate  Gastrointestinal:  Soft, Non-tender, + BS	  Skin: No rashes, No ecchymoses, No cyanosis	  Neurologic: Non-focal  Extremities: Normal range of motion, No clubbing, cyanosis or edema  Vascular: Peripheral pulses palpable 2+ bilaterally    LABS:	 	                        9.1    6.3   )-----------( 391      ( 07 Jun 2017 04:44 )             30.3     06-07    131<L>  |  94<L>  |  57<H>  ----------------------------<  93  4.6   |  19<L>  |  2.15<H>    Ca    9.2      07 Jun 2017 04:44  Phos  4.6     06-07  Mg     2.6     06-07    TPro  7.1  /  Alb  3.4  /  TBili  1.8<H>  /  DBili  x   /  AST  99<H>  /  ALT  62<H>  /  AlkPhos  98  06-07      TELEMETRY: 	    ECG:  	  RADIOLOGY:  OTHER: 	    PREVIOUS DIAGNOSTIC TESTING:    [ ] Echocardiogram: 5/11- EF 15%. severe global LVSD. mild MR, eccentric LVH, mild LVE. Events: No acute events overnight. Patient with INR 7 in setting no active bleeding.  Xarelto held.   Patient this AM reports no complaints.  Denies SOB or CP. Has not walked around yet.     MEDICATIONS  (STANDING):  mexiletine 150milliGRAM(s) Oral three times a day  docusate sodium 100milliGRAM(s) Oral three times a day  senna 2Tablet(s) Oral at bedtime  multivitamin/minerals 1Tablet(s) Oral daily  calcium carbonate  625 mG + Vitamin D (OsCal 250 + D) 1Tablet(s) Oral daily  folic acid 1milliGRAM(s) Oral daily  ascorbic acid 500milliGRAM(s) Oral daily  metoprolol succinate ER 25milliGRAM(s) Oral daily  furosemide Infusion 15mG/Hr IV Continuous <Continuous>  milrinone Infusion 0.5MICROgram(s)/kG/Min IV Continuous <Continuous>    MEDICATIONS  (PRN):  bisacodyl 5milliGRAM(s) Oral every 12 hours PRN Constipation      REVIEW OF SYSTEMS:  Otherwise, 10 point ROS done and otherwise negative.    PHYSICAL EXAM:  Vital Signs Last 24 Hrs  T(C): 36.7, Max: 36.7 (06-06 @ 21:20)  T(F): 98.1, Max: 98.1 (06-06 @ 21:20)  HR: 88 (88 - 96)  BP: 84/64 (72/55 - 99/56)  BP(mean): 69 (60 - 77)  RR: 27 (15 - 27)  SpO2: 100% (93% - 100%)  I&O's Summary    I & Os for current day (as of 07 Jun 2017 07:18)  =============================================  IN: 1150.8 ml / OUT: 800 ml / NET: 350.8 ml      Appearance: Normal	  HEENT:   Normal oral mucosa, PERRL, EOMI	  Lymphatic: No lymphadenopathy  Cardiovascular: Irregular rhythm. Normal S1 S2, No JVD, No murmurs, No edema  Respiratory: Good airway entry. Decreased breath sounds at bases.   Psychiatry: A & O x 3, Mood & affect appropriate  Gastrointestinal:  Soft, Non-tender, + BS	  Skin: No rashes, No ecchymoses, No cyanosis	  Neurologic: Non-focal  Extremities: Normal range of motion, No clubbing, cyanosis or edema  Vascular: Peripheral pulses palpable 2+ bilaterally    LABS:	 	                        9.1    6.3   )-----------( 391      ( 07 Jun 2017 04:44 )             30.3     06-07    131<L>  |  94<L>  |  57<H>  ----------------------------<  93  4.6   |  19<L>  |  2.15<H>    Ca    9.2      07 Jun 2017 04:44  Phos  4.6     06-07  Mg     2.6     06-07    TPro  7.1  /  Alb  3.4  /  TBili  1.8<H>  /  DBili  x   /  AST  99<H>  /  ALT  62<H>  /  AlkPhos  98  06-07      TELEMETRY: 	    ECG:  	  RADIOLOGY:  OTHER: 	    PREVIOUS DIAGNOSTIC TESTING:    [ ] Echocardiogram: 5/11- EF 15%. severe global LVSD. mild MR, eccentric LVH, mild LVE. Events: No acute events overnight. Patient with INR 7 in setting no active bleeding.  Xarelto held.   Patient this AM reports no complaints.  Denies SOB or CP. Has not walked around yet.     MEDICATIONS  (STANDING):  mexiletine 150milliGRAM(s) Oral three times a day  docusate sodium 100milliGRAM(s) Oral three times a day  senna 2Tablet(s) Oral at bedtime  multivitamin/minerals 1Tablet(s) Oral daily  calcium carbonate  625 mG + Vitamin D (OsCal 250 + D) 1Tablet(s) Oral daily  folic acid 1milliGRAM(s) Oral daily  ascorbic acid 500milliGRAM(s) Oral daily  metoprolol succinate ER 25milliGRAM(s) Oral daily  furosemide Infusion 15mG/Hr IV Continuous <Continuous>  milrinone Infusion 0.5MICROgram(s)/kG/Min IV Continuous <Continuous>    MEDICATIONS  (PRN):  bisacodyl 5milliGRAM(s) Oral every 12 hours PRN Constipation      REVIEW OF SYSTEMS:  Otherwise, 10 point ROS done and otherwise negative.    PHYSICAL EXAM:  Vital Signs Last 24 Hrs  T(C): 36.7, Max: 36.7 (06-06 @ 21:20)  T(F): 98.1, Max: 98.1 (06-06 @ 21:20)  HR: 88 (88 - 96)  BP: 84/64 (72/55 - 99/56)  BP(mean): 69 (60 - 77)  RR: 27 (15 - 27)  SpO2: 100% (93% - 100%)  I&O's Summary    I & Os for current day (as of 07 Jun 2017 07:18)  =============================================  IN: 1150.8 ml / OUT: 800 ml / NET: 350.8 ml      Appearance: Normal	  HEENT:   Normal oral mucosa, PERRL, EOMI	  Lymphatic: No lymphadenopathy  Cardiovascular: Irregular rhythm. Normal S1 S2, No JVD, No murmurs, No edema  Respiratory: Good airway entry. Decreased breath sounds at bases.   Psychiatry: A & O x 3, Mood & affect appropriate  Gastrointestinal:  Soft, Non-tender, + BS	  Skin: No rashes, No ecchymoses, No cyanosis	  Neurologic: Non-focal  Extremities: Normal range of motion, No clubbing, cyanosis or edema  Vascular: Peripheral pulses palpable 2+ bilaterally    LABS:	 	                        9.1    6.3   )-----------( 391      ( 07 Jun 2017 04:44 )             30.3     06-07    131<L>  |  94<L>  |  57<H>  ----------------------------<  93  4.6   |  19<L>  |  2.15<H>    Ca    9.2      07 Jun 2017 04:44  Phos  4.6     06-07  Mg     2.6     06-07    TPro  7.1  /  Alb  3.4  /  TBili  1.8<H>  /  DBili  x   /  AST  99<H>  /  ALT  62<H>  /  AlkPhos  98  06-07      TELEMETRY: PVCs	    ECG:  	pending     PREVIOUS DIAGNOSTIC TESTING:    [ ] Echocardiogram: 5/11- EF 15%. severe global LVSD. mild MR, eccentric LVH, mild LVE.

## 2017-06-07 NOTE — PROGRESS NOTE ADULT - PROBLEM SELECTOR PLAN 5
Likely in setting of hepatic congestion due to ADHF. Denies  abdominal pain.   Continue diuresis.  Will hold off on Abdominal US at this time.

## 2017-06-07 NOTE — PROGRESS NOTE ADULT - PROBLEM SELECTOR PLAN 2
Like pre-renal in setting of ADHF. Low flow state.   Slight worsening today.  Continue aggressive diuresis.  If still worsening will send ULytes today.  Fe Urea since patient is on Lasix gtt.

## 2017-06-07 NOTE — CONSULT NOTE ADULT - SUBJECTIVE AND OBJECTIVE BOX
[facility] DIVISION OF KIDNEY DISEASES AND HYPERTENSION -- INITIAL CONSULT NOTE  --------------------------------------------------------------------------------  HPI:  68y/o M with advanced NICM BiV HFrEF 15-20% on a stable dose of milrinone at 3mcg/kg/min via PICC s/p AICD, with replacement from [employer] to [employer] (2009), PAF on AC and h/o VT, with recent admission for AICD firing, now presenting with dizziness and nausea for one likely decompensated heart failure.  Pt with Scr 1.2 on 5/19/2017. Now patient with elevated Scr ranging from 1.9.         PAST HISTORY  --------------------------------------------------------------------------------  PAST MEDICAL & SURGICAL HISTORY:  H/O prior ablation treatment: for Afib  Ventricular fibrillation: s/p AICD  PAF (paroxysmal atrial fibrillation): on xarelto  Non-Ischemic Cardiomyopathy  SVT (Supraventricular Tachycardia)  HTN  CHF (Congestive Heart Failure)  Status post left hip replacement  Hypertension  Hypertension  History of Prior Ablation Treatment: for afib  AICD (Automatic Cardioverter/Defibrillator) Present: [employer] with 1 [employer] lead4/1/09- explanted and replaced with [employer] 2 leads on 9/2/09    FAMILY HISTORY:  No pertinent family history in first degree relatives    PAST SOCIAL HISTORY:    ALLERGIES & MEDICATIONS  --------------------------------------------------------------------------------  Allergies    No Known Allergies    Intolerances      Standing Inpatient Medications  mexiletine 150milliGRAM(s) Oral three times a day  docusate sodium 100milliGRAM(s) Oral three times a day  senna 2Tablet(s) Oral at bedtime  multivitamin/minerals 1Tablet(s) Oral daily  calcium carbonate  625 mG + Vitamin D (OsCal 250 + D) 1Tablet(s) Oral daily  folic acid 1milliGRAM(s) Oral daily  ascorbic acid 500milliGRAM(s) Oral daily  metoprolol succinate ER 25milliGRAM(s) Oral daily  furosemide Infusion 15mG/Hr IV Continuous <Continuous>  milrinone Infusion 0.5MICROgram(s)/kG/Min IV Continuous <Continuous>    PRN Inpatient Medications  bisacodyl 5milliGRAM(s) Oral every 12 hours PRN      REVIEW OF SYSTEMS  --------------------------------------------------------------------------------  Gen: No fevers/chills  Skin: No rashes  Head/Eyes/Ears: Normal hearing,  Normal vision   Respiratory: No dyspnea, cough  CV: No chest pain  GI: + nausea, vomiting  : No dysuria, hematuria  MSK: No  edema  Heme: No easy bruising or bleeding  Psych: No significant depression    All other systems were reviewed and are negative, except as noted.    VITALS/PHYSICAL EXAM  --------------------------------------------------------------------------------  T(C): 36.9, Max: 36.9 (06-07 @ 07:00)  HR: 98 (88 - 98)  BP: 99/81 (72/55 - 99/81)  RR: 21 (11 - 27)  SpO2: 100% (93% - 100%)  Wt(kg): --  Height (cm): 172.7 (06-06-17 @ 21:20)  Weight (kg): 75 (06-06-17 @ 21:20)  BMI (kg/m2): 25.1 (06-06-17 @ 21:20)  BSA (m2): 1.88 (06-06-17 @ 21:20)    I & Os for 24h ending 06-07-17 @ 07:00  =============================================  IN: 1150.8 ml / OUT: 800 ml / NET: 350.8 ml    I & Os for current day (as of 06-07-17 @ 14:21)  =============================================  IN: 222.8 ml / OUT: 350 ml / NET: -127.2 ml    Physical Exam:  	Gen: NAD  	HEENT: MMM  	Pulm: Coarse bs b/l   	CV: S1S2  	Abd: Soft, +BS   	Ext: No LE edema B/L  	Neuro: Awake  	Skin: Warm and dry      LABS/STUDIES  --------------------------------------------------------------------------------              9.1    6.3   >-----------<  391      [06-07-17 @ 04:44]              30.3     130  |  91  |  57  ----------------------------<  110      [06-07-17 @ 13:39]  4.3   |  22  |  2.06        Ca     9.0     [06-07-17 @ 13:39]      Mg     2.6     [06-07-17 @ 04:44]      Phos  4.6     [06-07-17 @ 04:44]    TPro  7.1  /  Alb  3.7  /  TBili  1.8  /  DBili  x   /  AST  117  /  ALT  75  /  AlkPhos  100  [06-07-17 @ 13:39]    PT/INR: PT 80.4 , INR 7.09       [06-07-17 @ 05:43]  PTT: 41.7       [06-07-17 @ 04:44]      Creatinine Trend:  SCr 2.06 [06-07 @ 13:39]  SCr 2.15 [06-07 @ 04:44]  SCr 2.08 [06-06 @ 23:27]  SCr 2.08 [06-06 @ 18:12]  SCr 1.76 [06-06 @ 07:33]    Urinalysis - [06-03-17 @ 10:46]      Color Mary / Appearance Clear / SG 1.012 / pH 5.0      Gluc Negative / Ketone Negative  / Bili Negative / Urobili 1.0       Blood Negative / Protein Negative / Leuk Est Negative / Nitrite Negative      RBC 2 / WBC 1 / Hyaline 6 / Gran  / Sq Epi  / Non Sq Epi 0 / Bacteria Negative    Urine Creatinine 131      [06-03-17 @ 10:46]  Urine Protein 24      [06-03-17 @ 10:46]  Urine Sodium <20      [06-03-17 @ 10:46]  Urine Urea Nitrogen 499      [06-03-17 @ 10:46]    Iron 26, TIBC 252, %sat 10      [05-15-17 @ 13:47]  Ferritin 112.0      [05-15-17 @ 13:47]  HbA1c 5.5      [06-07-17 @ 06:14]  TSH 3.04      [06-02-17 @ 17:42]  Lipid: chol 62, TG 33, HDL 17, LDL 38      [06-07-17 @ 06:14]    HBsAb Nonreact      [05-15-17 @ 15:40]  HBsAg Nonreact      [05-15-17 @ 15:40]  HBcAb Nonreact      [05-15-17 @ 15:40]  HCV 0.17, Nonreact      [05-15-17 @ 15:40]  HIV Nonreact      [05-15-17 @ 13:47] [facility] DIVISION OF KIDNEY DISEASES AND HYPERTENSION -- INITIAL CONSULT NOTE  --------------------------------------------------------------------------------  HPI:  66y/o M with advanced NICM BiV HFrEF 15-20% on a stable dose of milrinone at 3mcg/kg/min via PICC s/p AICD, with replacement from [employer] to [employer] (2009), PAF on AC and h/o VT, with recent admission for AICD firing, now presenting with dizziness and nausea for one likely decompensated heart failure.  Pt with Scr 1.2 on 5/19/2017. Now patient with elevated Scr ranging from 1.9.         PAST HISTORY  --------------------------------------------------------------------------------  PAST MEDICAL & SURGICAL HISTORY:  H/O prior ablation treatment: for Afib  Ventricular fibrillation: s/p AICD  PAF (paroxysmal atrial fibrillation): on xarelto  Non-Ischemic Cardiomyopathy  SVT (Supraventricular Tachycardia)  HTN  CHF (Congestive Heart Failure)  Status post left hip replacement  Hypertension  Hypertension  History of Prior Ablation Treatment: for afib  AICD (Automatic Cardioverter/Defibrillator) Present: [employer] with 1 [employer] lead4/1/09- explanted and replaced with [employer] 2 leads on 9/2/09    FAMILY HISTORY:  No pertinent family history in first degree relatives    PAST SOCIAL HISTORY:    ALLERGIES & MEDICATIONS  --------------------------------------------------------------------------------  Allergies    No Known Allergies    Intolerances      Standing Inpatient Medications  mexiletine 150milliGRAM(s) Oral three times a day  docusate sodium 100milliGRAM(s) Oral three times a day  senna 2Tablet(s) Oral at bedtime  multivitamin/minerals 1Tablet(s) Oral daily  calcium carbonate  625 mG + Vitamin D (OsCal 250 + D) 1Tablet(s) Oral daily  folic acid 1milliGRAM(s) Oral daily  ascorbic acid 500milliGRAM(s) Oral daily  metoprolol succinate ER 25milliGRAM(s) Oral daily  furosemide Infusion 15mG/Hr IV Continuous <Continuous>  milrinone Infusion 0.5MICROgram(s)/kG/Min IV Continuous <Continuous>    PRN Inpatient Medications  bisacodyl 5milliGRAM(s) Oral every 12 hours PRN      REVIEW OF SYSTEMS  --------------------------------------------------------------------------------  Gen: No fevers/chills  Skin: No rashes  Head/Eyes/Ears: Normal hearing,  Normal vision   Respiratory: No dyspnea, cough  CV: No chest pain  GI: + nausea, vomiting  : No dysuria, hematuria  MSK: No  edema  Heme: No easy bruising or bleeding  Psych: No significant depression    All other systems were reviewed and are negative, except as noted.    VITALS/PHYSICAL EXAM  --------------------------------------------------------------------------------  T(C): 36.9, Max: 36.9 (06-07 @ 07:00)  HR: 98 (88 - 98)  BP: 99/81 (72/55 - 99/81)  RR: 21 (11 - 27)  SpO2: 100% (93% - 100%)  Wt(kg): --  Height (cm): 172.7 (06-06-17 @ 21:20)  Weight (kg): 75 (06-06-17 @ 21:20)  BMI (kg/m2): 25.1 (06-06-17 @ 21:20)  BSA (m2): 1.88 (06-06-17 @ 21:20)    I & Os for 24h ending 06-07-17 @ 07:00  =============================================  IN: 1150.8 ml / OUT: 800 ml / NET: 350.8 ml    I & Os for current day (as of 06-07-17 @ 14:21)  =============================================  IN: 222.8 ml / OUT: 350 ml / NET: -127.2 ml    Physical Exam:  	Gen: NAD  	HEENT: MMM  	Pulm: Coarse bs b/l   	CV: S1S2  	Abd: Soft, +BS   	Ext: No LE edema B/L  	Neuro: Awake  	Skin: Warm and dry; no rash                Other -no emily      LABS/STUDIES  --------------------------------------------------------------------------------              9.1    6.3   >-----------<  391      [06-07-17 @ 04:44]              30.3     130  |  91  |  57  ----------------------------<  110      [06-07-17 @ 13:39]  4.3   |  22  |  2.06        Ca     9.0     [06-07-17 @ 13:39]      Mg     2.6     [06-07-17 @ 04:44]      Phos  4.6     [06-07-17 @ 04:44]    TPro  7.1  /  Alb  3.7  /  TBili  1.8  /  DBili  x   /  AST  117  /  ALT  75  /  AlkPhos  100  [06-07-17 @ 13:39]    PT/INR: PT 80.4 , INR 7.09       [06-07-17 @ 05:43]  PTT: 41.7       [06-07-17 @ 04:44]      Creatinine Trend:  SCr 2.06 [06-07 @ 13:39]  SCr 2.15 [06-07 @ 04:44]  SCr 2.08 [06-06 @ 23:27]  SCr 2.08 [06-06 @ 18:12]  SCr 1.76 [06-06 @ 07:33]    Urinalysis - [06-03-17 @ 10:46]      Color Mary / Appearance Clear / SG 1.012 / pH 5.0      Gluc Negative / Ketone Negative  / Bili Negative / Urobili 1.0       Blood Negative / Protein Negative / Leuk Est Negative / Nitrite Negative      RBC 2 / WBC 1 / Hyaline 6 / Gran  / Sq Epi  / Non Sq Epi 0 / Bacteria Negative    Urine Creatinine 131      [06-03-17 @ 10:46]  Urine Protein 24      [06-03-17 @ 10:46]  Urine Sodium <20      [06-03-17 @ 10:46]  Urine Urea Nitrogen 499      [06-03-17 @ 10:46]    Iron 26, TIBC 252, %sat 10      [05-15-17 @ 13:47]  Ferritin 112.0      [05-15-17 @ 13:47]  HbA1c 5.5      [06-07-17 @ 06:14]  TSH 3.04      [06-02-17 @ 17:42]  Lipid: chol 62, TG 33, HDL 17, LDL 38      [06-07-17 @ 06:14]    HBsAb Nonreact      [05-15-17 @ 15:40]  HBsAg Nonreact      [05-15-17 @ 15:40]  HBcAb Nonreact      [05-15-17 @ 15:40]  HCV 0.17, Nonreact      [05-15-17 @ 15:40]  HIV Nonreact      [05-15-17 @ 13:47]

## 2017-06-07 NOTE — PROGRESS NOTE ADULT - PROBLEM SELECTOR PLAN 4
Rate control. Continue with Toprol XL 25mg QD. Will up-titrate if necessary.   Xarelto held in setting of supra therapeutic INR.

## 2017-06-07 NOTE — CONSULT NOTE ADULT - ASSESSMENT
68y/o M with HFrEF 15-20% on a stable dose of milrinone at 3mcg/kg/min via PICC s/p AICD, Paroxymal AFib on xarelto, found to have a abnormal coagulation.

## 2017-06-07 NOTE — CONSULT NOTE ADULT - ASSESSMENT
66y/o M with advanced NICM BiV HFrEF 15-20% on a stable dose of milrinone at 3mcg/kg/min via PICC s/p AICD, with replacement from [employer] to [employer] (2009), PAF on AC and h/o VT, with recent admission for AICD firing, now presenting with dizziness and nausea for one likely decompensated heart failure.

## 2017-06-07 NOTE — CONSULT NOTE ADULT - SUBJECTIVE AND OBJECTIVE BOX
Hematology Consult Note    HPI:  66y/o M with HFrEF 15-20% on a stable dose of milrinone at 3mcg/kg/min via PICC s/p AICD, Paroxymal AFib on AC and h/o VT, with recent admission for AICD firing, now presenting with dizziness and nausea/vomiting X 1 Day. He says that he was doing well earlier in the day and felt like his usual self. Normally he only walks from his living room to the front steps and this afternoon he decided to walk around a city block, which is significantly longer than he normally walks for. A while after that he developed nausea and vomited NBNB 3x and felt dizzy. ROS otherwise negative for f/c/ns/cp/abd p/diarrhea/consitpation, no syncope; no changes in medication, taking as prescribed. He says that his normal BP is around 90 systolic.    Patient initially presented to [facility], seen by cardiology. Labs noteable for pro bnp 3424, Trop T 0.08, CK 50, hyponatremia to 133 (from 135 two weeks prior), INR elevated at 4.36, Cr 2.07 from BL 0.9-1, BR 1.8. EKG showing NSR with first degree block. (03 Jun 2017 00:32)      Allergies    No Known Allergies    Intolerances        MEDICATIONS  (STANDING):  mexiletine 150milliGRAM(s) Oral three times a day  docusate sodium 100milliGRAM(s) Oral three times a day  senna 2Tablet(s) Oral at bedtime  multivitamin/minerals 1Tablet(s) Oral daily  calcium carbonate  625 mG + Vitamin D (OsCal 250 + D) 1Tablet(s) Oral daily  folic acid 1milliGRAM(s) Oral daily  ascorbic acid 500milliGRAM(s) Oral daily  metoprolol succinate ER 25milliGRAM(s) Oral daily  furosemide Infusion 15mG/Hr IV Continuous <Continuous>  milrinone Infusion 0.5MICROgram(s)/kG/Min IV Continuous <Continuous>    MEDICATIONS  (PRN):  bisacodyl 5milliGRAM(s) Oral every 12 hours PRN Constipation      PAST MEDICAL & SURGICAL HISTORY:  H/O prior ablation treatment: for Afib  Ventricular fibrillation: s/p AICD  PAF (paroxysmal atrial fibrillation): on xarelto  Non-Ischemic Cardiomyopathy  SVT (Supraventricular Tachycardia)  HTN  CHF (Congestive Heart Failure)  Status post left hip replacement  Hypertension  Hypertension  History of Prior Ablation Treatment: for afib  AICD (Automatic Cardioverter/Defibrillator) Present: [employer] with 1 [employer] lead4/1/09- explanted and replaced with [employer] 2 leads on 9/2/09      FAMILY HISTORY:  No pertinent family history in first degree relatives      SOCIAL HISTORY: No EtOH, no tobacco    REVIEW OF SYSTEMS:    CONSTITUTIONAL: No weakness, fevers or chills  EYES/ENT: No visual changes;  No vertigo or throat pain   NECK: No pain or stiffness  RESPIRATORY: No cough, wheezing, hemoptysis; No shortness of breath  CARDIOVASCULAR: No chest pain or palpitations  GASTROINTESTINAL: No abdominal or epigastric pain. No nausea, vomiting, or hematemesis; No diarrhea or constipation. No melena or hematochezia.  GENITOURINARY: No dysuria, frequency or hematuria  NEUROLOGICAL: No numbness or weakness  SKIN: No itching, burning, rashes, or lesions   All other review of systems is negative unless indicated above.    Height (cm): 172.7 (06-06 @ 21:20)  Weight (kg): 75 (06-06 @ 21:20)  BMI (kg/m2): 25.1 (06-06 @ 21:20)  BSA (m2): 1.88 (06-06 @ 21:20)    T(F): 98.4, Max: 98.4 (06-07 @ 07:00)  HR: 96  BP: 99/81  RR: 26  SpO2: 100%  Wt(kg): --    GENERAL: NAD, well-developed  HEAD:  Atraumatic, Normocephalic  EYES: EOMI, PERRLA, conjunctiva and sclera clear  NECK: Supple, No JVD  CHEST/LUNG: Clear to auscultation bilaterally; No wheeze  HEART: Regular rate and rhythm; No murmurs, rubs, or gallops  ABDOMEN: Soft, Nontender, Nondistended; Bowel sounds present  EXTREMITIES:  2+ Peripheral Pulses, No clubbing, cyanosis, or edema  NEUROLOGY: non-focal  SKIN: No rashes or lesions                          9.1    6.3   )-----------( 391      ( 07 Jun 2017 04:44 )             30.3       06-07    131<L>  |  94<L>  |  57<H>  ----------------------------<  93  4.6   |  19<L>  |  2.15<H>    Ca    9.2      07 Jun 2017 04:44  Phos  4.6     06-07  Mg     2.6     06-07    TPro  7.1  /  Alb  3.4  /  TBili  1.8<H>  /  DBili  x   /  AST  99<H>  /  ALT  62<H>  /  AlkPhos  98  06-07      Magnesium, Serum: 2.6 mg/dL (06-07 @ 04:44)  Phosphorus Level, Serum: 4.6 mg/dL (06-07 @ 04:44)  Magnesium, Serum: 2.6 mg/dL (06-06 @ 23:27)  Phosphorus Level, Serum: 4.7 mg/dL (06-06 @ 23:27) Hematology Consult Note    HPI:  68y/o M with HFrEF 15-20% on a stable dose of milrinone at 3mcg/kg/min via PICC s/p AICD, Paroxymal AFib on AC and h/o VT, with recent admission for AICD firing, now presenting with dizziness and nausea/vomiting X 1 Day. He says that he was doing well earlier in the day and felt like his usual self. Normally he only walks from his living room to the front steps and this afternoon he decided to walk around a city block, which is significantly longer than he normally walks for. A while after that he developed nausea and vomited NBNB 3x and felt dizzy. ROS otherwise negative for f/c/ns/cp/abd p/diarrhea/consitpation, no syncope; no changes in medication, taking as prescribed. He says that his normal BP is around 90 systolic.    Patient initially presented to [facility], seen by cardiology. Labs noteable for pro bnp 3424, Trop T 0.08, CK 50, hyponatremia to 133 (from 135 two weeks prior), INR elevated at 4.36, Cr 2.07 from BL 0.9-1, BR 1.8. EKG showing NSR with first degree block. (03 Jun 2017 00:32)      Allergies    No Known Allergies    Intolerances        MEDICATIONS  (STANDING):  mexiletine 150milliGRAM(s) Oral three times a day  docusate sodium 100milliGRAM(s) Oral three times a day  senna 2Tablet(s) Oral at bedtime  multivitamin/minerals 1Tablet(s) Oral daily  calcium carbonate  625 mG + Vitamin D (OsCal 250 + D) 1Tablet(s) Oral daily  folic acid 1milliGRAM(s) Oral daily  ascorbic acid 500milliGRAM(s) Oral daily  metoprolol succinate ER 25milliGRAM(s) Oral daily  furosemide Infusion 15mG/Hr IV Continuous <Continuous>  milrinone Infusion 0.5MICROgram(s)/kG/Min IV Continuous <Continuous>    MEDICATIONS  (PRN):  bisacodyl 5milliGRAM(s) Oral every 12 hours PRN Constipation      PAST MEDICAL & SURGICAL HISTORY:  H/O prior ablation treatment: for Afib  Ventricular fibrillation: s/p AICD  PAF (paroxysmal atrial fibrillation): on xarelto  Non-Ischemic Cardiomyopathy  SVT (Supraventricular Tachycardia)  HTN  CHF (Congestive Heart Failure)  Status post left hip replacement  Hypertension  Hypertension  History of Prior Ablation Treatment: for afib  AICD (Automatic Cardioverter/Defibrillator) Present: [employer] with 1 [employer] lead4/1/09- explanted and replaced with [employer] 2 leads on 9/2/09      FAMILY HISTORY:  No pertinent family history in first degree relatives      SOCIAL HISTORY: No EtOH, no tobacco    REVIEW OF SYSTEMS:    CONSTITUTIONAL: No weakness, fevers or chills  EYES/ENT: No visual changes;  No vertigo or throat pain   NECK: No pain or stiffness  RESPIRATORY: No cough, wheezing, hemoptysis; No shortness of breath  CARDIOVASCULAR: No chest pain or palpitations  GASTROINTESTINAL: No abdominal or epigastric pain. No nausea, vomiting, or hematemesis; No diarrhea or constipation. No melena or hematochezia.  GENITOURINARY: No dysuria, frequency or hematuria  NEUROLOGICAL: No numbness or weakness  SKIN: No itching, burning, rashes, or lesions   All other review of systems is negative unless indicated above.    Height (cm): 172.7 (06-06 @ 21:20)  Weight (kg): 75 (06-06 @ 21:20)  BMI (kg/m2): 25.1 (06-06 @ 21:20)  BSA (m2): 1.88 (06-06 @ 21:20)    T(F): 98.4, Max: 98.4 (06-07 @ 07:00)  HR: 96  BP: 99/81  RR: 26  SpO2: 100%  Wt(kg): --    GENERAL: NAD, well-developed  HEAD:  Atraumatic, Normocephalic  EYES: EOMI, PERRLA, conjunctiva and sclera clear  CHEST/LUNG: crackles in the bases bilateral.   HEART: Regular rate and rhythm; No murmurs, rubs, or gallops  ABDOMEN: Soft, Nontender, Nondistended; Bowel sounds present  EXTREMITIES:  2+ Peripheral Pulses, No clubbing, cyanosis, or edema  NEUROLOGY: non-focal  SKIN: No rashes or lesions                          9.1    6.3   )-----------( 391      ( 07 Jun 2017 04:44 )             30.3       06-07    131<L>  |  94<L>  |  57<H>  ----------------------------<  93  4.6   |  19<L>  |  2.15<H>    Ca    9.2      07 Jun 2017 04:44  Phos  4.6     06-07  Mg     2.6     06-07    TPro  7.1  /  Alb  3.4  /  TBili  1.8<H>  /  DBili  x   /  AST  99<H>  /  ALT  62<H>  /  AlkPhos  98  06-07      Magnesium, Serum: 2.6 mg/dL (06-07 @ 04:44)  Phosphorus Level, Serum: 4.6 mg/dL (06-07 @ 04:44)  Magnesium, Serum: 2.6 mg/dL (06-06 @ 23:27)  Phosphorus Level, Serum: 4.7 mg/dL (06-06 @ 23:27)

## 2017-06-07 NOTE — PROGRESS NOTE ADULT - PROBLEM SELECTOR PLAN 6
Likely in setting of hepatic congestion due to ADHF. Denies abdominal pain. No evidence of jaundice on exam.   Continue diuresis.

## 2017-06-07 NOTE — PROGRESS NOTE ADULT - PROBLEM SELECTOR PLAN 3
Patient with INR of 7. No signs of active bleeding.   Will hold Xarelto at this time.   Daily coags.

## 2017-06-07 NOTE — PROGRESS NOTE ADULT - PROBLEM SELECTOR PLAN 1
Patient with severe global LVSD with EF 15% in May. On Milrinone at home.  Currently net positive 350 with 1L out in 24hours.  Continue Milrinone gtt. Continue Lasix at 15cc/hr. Consider Metolazone today.  Strict I/Os. Daily Weights.

## 2017-06-07 NOTE — PROGRESS NOTE ADULT - ATTENDING COMMENTS
Moved to CCU.   Lasix 15/hr, Milrinone 0.5, I/O 350.   Labs: Cr 2.1 (2.0 on admission to 1.4 June 5th)  INR 7.0. (4.3 on admission)   Mild LFT abnormalities. ALT 62, AST 99, APHOs 98, Bi 1.8, LDH  Hb 9.1. Fe 10% Moved to CCU.   Lasix 15/hr, Milrinone 0.5, I/O 350.   Labs: Cr 2.1 (2.0 on admission to 1.4 June 5th)  INR 7.0. (4.3 on admission)   Mild LFT abnormalities. ALT 62, AST 99, APHOs 98, Bi 1.8, LDH  Hb 9.1. Fe 10%. (Hb on d/c June 2,  9.6)   MAP 70s , 90s SR dysneic at rest.   JVP not well seen. Abdo soft mildly distended.   Concern that patient is deteriorating and might need LVAD this admission.   SUGGEST:   Reverse AC. swan by tomorrow. Needs anemia work up pre LVAD.  Continue milrinone and Lasix at current dose.  Marvin Campbell

## 2017-06-07 NOTE — CONSULT NOTE ADULT - PROBLEM SELECTOR RECOMMENDATION 9
hemodynamically mediated in setting of decompensated heart failure. Pt with elevated but stable Scr at 2.06 today. Pt non -oliguric ( Urine outpt 1100). Advice to taper lasix drip, check renal and bladder sonogram. Monitor BMP, strict I/O, avoid nephrotoxics, NSAIDs, RCA.
Patient has been on xarelto for Afib. Normal coags on April/2017.   -Please send mixing studies. If mixing studies abnormal, check factor V, X and VIII.   -Trend LFTs and US of the abdomen.   -Holding xarelto.  -Can start Vitamin K 5mg PO daily, trend coags.

## 2017-06-07 NOTE — PROGRESS NOTE ADULT - SUBJECTIVE AND OBJECTIVE BOX
HPI:  68y/o M with advanced NICM BiV HFrEF 15-20% on a stable dose of milrinone at 3mcg/kg/min via PICC s/p AICD, with replacement from [employer] to [employer] (), PAF on AC and h/o VT, with recent admission for AICD firing, now presenting with dizziness and nausea/vomiting X 1 Day. He says that he was doing well earlier in the day and felt like his usual self. Normally he only walks from his living room to the front steps and this afternoon he decided to walk around a city block, which is significantly longer than he normally walks for. A while after that he developed nausea and vomited NBNB 3x and felt dizzy. ROS otherwise negative for f/c/ns/cp/abd p/diarrhea/consitpation, no syncope; no changes in medication, taking as prescribed. He says that his normal BP is around 90 systolic.    Patient initially presented to [facility], seen by cardiology. Labs noteable for pro bnp 3424, Trop T 0.08, CK 50, hyponatremia to 133 (from 135 two weeks prior), INR elevated at 4.36, Cr 2.07 from BL 0.9-1, BR 1.8. EKG showing NSR with first degree block. (2017 00:32)    Review Of Systems:  Constitutional: [ ] Fever [ ] Chills [ ] Fatigue [ ] Weight change   HEENT: [ ] Blurred vision [ ] Eye Pain [ ] Headache [ ] Runny nose [ ] Sore Throat   Respiratory: [ ] Cough [ ] Wheezing [ ] Shortness of breath  Cardiovascular: [ ] Chest Pain [ ] Palpitations [ ] LOBATO [ ] PND [ ] Orthopnea  Gastrointestinal: [ ] Abdominal Pain [ ] Diarrhea [ ] Constipation [ ] Hemorrhoids [ ] Nausea [ ] Vomiting  Genitourinary: [ ] Nocturia [ ] Dysuria [ ] Incontinence  Extremities: [ ] Swelling [ ] Joint Pain  Neurologic: [ ] Focal deficit [ ] Paresthesias [ ] Syncope  Lymphatic: [ ] Swelling [ ] Lymphadenopathy   Skin: [ ] Rash [ ] Ecchymoses [ ] Wounds [ ] Lesions  Psychiatry: [ ] Depression [ ] Suicidal/Homicidal Ideation [ ] Anxiety [ ] Sleep Disturbances  [ ] 10 point review of systems is otherwise negative except as mentioned above            [ ]Unable to obtain    Medications:  mexiletine 150milliGRAM(s) Oral three times a day  docusate sodium 100milliGRAM(s) Oral three times a day  senna 2Tablet(s) Oral at bedtime  multivitamin/minerals 1Tablet(s) Oral daily  calcium carbonate  625 mG + Vitamin D (OsCal 250 + D) 1Tablet(s) Oral daily  folic acid 1milliGRAM(s) Oral daily  ascorbic acid 500milliGRAM(s) Oral daily  bisacodyl 5milliGRAM(s) Oral every 12 hours PRN  metoprolol succinate ER 25milliGRAM(s) Oral daily  furosemide Infusion 15mG/Hr IV Continuous <Continuous>  milrinone Infusion 0.5MICROgram(s)/kG/Min IV Continuous <Continuous>    Vitals:  T(C): 36.9, Max: 36.9 ( @ 07:00)  HR: 96 (88 - 96)  BP: 93/70 (72/55 - 99/56)  BP(mean): 77 (60 - 77)  ABP: --  ABP(mean): --  RR: 11 (11 - 27)  SpO2: 100% (93% - 100%)  Wt(kg): --  CVP(cm H2O): --  CO: --  CI: --  PA: --  PA(mean): --  PCWP: --  SVR: --  PVR: --    Weight (kg): 75 ( @ 21:20)  Daily Weight in k.7 (2017 04:00)    I&O's Summary    I & Os for current day (as of 2017 08:01)  =============================================  IN: 1150.8 ml / OUT: 800 ml / NET: 350.8 ml      Physical Exam:  Appearance: [ ] Normal [ ] NAD  Eyes: [ ] PERRL [ ] EOMI  HENT: [ ] Normal oral muscosa [ ]NC/AT  Cardiovascular: [ ] S1 [ ] S2 [ ] RRR [ ] No m/r/g [ ]No edema [ ] JVP  Procedural Access Site: [ ] No hematoma [ ] Non-tender to palpation [ ] 2+ pulse [ ] No bruit [ ] No Ecchymosis  Respiratory: [ ] Clear to auscultation bilaterally  Gastrointestinal: [ ] Soft [ ] Non-tender [ ] Non-distended [ ] BS+  Musculoskeletal: [ ] No clubbing [ ] No joint deformity   Neurologic: [ ] Non-focal  Lymphatic: [ ] No lymphadenopathy  Psychiatry: [ ] AAOx3 [ ] Mood & affect appropriate  Skin: [ ] No rashes [ ] No ecchymoses [ ] No cyanosis    LVAD Interrogation:  Pump Flow:  Pump Speed:  Pulse Index:  Pump Power:  VAD Events:   Driveline evaluation:    Programming Changes: [ ] No changes made [ ] Changes made:  Labs:                        9.1    6.3   )-----------( 391      ( 2017 04:44 )             30.3     06-07    131<L>  |  94<L>  |  57<H>  ----------------------------<  93  4.6   |  19<L>  |  2.15<H>    Ca    9.2      2017 04:44  Phos  4.6     06-  Mg     2.6     06-07    TPro  7.1  /  Alb  3.4  /  TBili  1.8<H>  /  DBili  x   /  AST  99<H>  /  ALT  62<H>  /  AlkPhos  98  06-07    PT/INR - ( 2017 05:43 )   PT: 80.4 sec;   INR: 7.09 ratio         PTT - ( 2017 04:44 )  PTT:41.7 sec      Serum Pro-Brain Natriuretic Peptide: 3424 pg/mL ( @ 17:42)  -----------------------------------------------------------------  [ ] Congestive Heart Failure                  [ ] Acute                                                [ ] Acute on Chronic     [ ] Chronic  [ ] Diastolic (HFpEF)  [ ] Systolic (HFrEF)  [ ] Combined Systolic & Diastolic      [ ] ACC/AHA Stage: _____     [ ] NYHA Class: _____  -----------------------------------------------------------------  [ ] NORMAN                                                    [ ] CKD I  [ ] ATN                                                  [ ] CKD II  [ ] Other: _____                                  [ ] CKD III                                                                [ ] CKD IV                                                                [ ] CKD V                                                                [ ] ESRD  -----------------------------------------------------------------  Abnormal Nutritional Status:  [ ] Malnutrtion (see nutrition note)  [ ] Underweight: BMI < 19  [ ] Morbid Obeisty: BMI >/ 40  [ ] Cachexia  [ ] Other

## 2017-06-07 NOTE — PROGRESS NOTE ADULT - ASSESSMENT
67 year old man history of advanced non-ischemic cardiomyopathy , bi-ventricular HFreF(EF15% 5/2017) s/p AICD on home milrinone 3mcg/kg/min, PAF on AC, VT, recent hospitalization 5/2017 for AICD firing with admission to the CCU presents with dizziness, N/V x 1 day after exertion a/w ADHF with CCU transfer for pressure support diuresis.

## 2017-06-07 NOTE — CHART NOTE - NSCHARTNOTEFT_GEN_A_CORE
CCU MIDNIGHT ROUNDS    BILLY RUSSELL  [id number]  67y  Male    SUMMARY:    HPI:  68y/o M with advanced NICM BiV HFrEF 15-20% on a stable dose of milrinone at 3mcg/kg/min via PICC s/p AICD, with replacement from [employer] to [employer] (2009), PAF on AC and h/o VT, with recent admission for AICD firing, now presenting with dizziness and nausea/vomiting X 1 Day. He says that he was doing well earlier in the day and felt like his usual self. Normally he only walks from his living room to the front steps and this afternoon he decided to walk around a city block, which is significantly longer than he normally walks for. A while after that he developed nausea and vomited NBNB 3x and felt dizzy. ROS otherwise negative for f/c/ns/cp/abd p/diarrhea/consitpation, no syncope; no changes in medication, taking as prescribed. He says that his normal BP is around 90 systolic.    Patient initially presented to [facility], seen by cardiology. Labs noteable for pro bnp 3424, Trop T 0.08, CK 50, hyponatremia to 133 (from 135 two weeks prior), INR elevated at 4.36, Cr 2.07 from BL 0.9-1, BR 1.8. EKG showing NSR with first degree block. (03 Jun 2017 00:32)      NEW EVENTS:      UPDATED VITALS:    ICU Vital Signs Last 24 Hrs  T(C): 36.4, Max: 36.9 (06-07 @ 07:00)  T(F): 97.6, Max: 98.4 (06-07 @ 07:00)  HR: 102 (88 - 102)  BP: 85/65 (72/55 - 100/78)  BP(mean): 76 (60 - 109)  ABP: --  ABP(mean): --  RR: 23 (11 - 29)  SpO2: 95% (95% - 100%)      I&O's Summary  I & Os for 24h ending 07 Jun 2017 07:00  =============================================  IN: 1150.8 ml / OUT: 800 ml / NET: 350.8 ml    I & Os for current day (as of 07 Jun 2017 22:33)  =============================================  IN: 562 ml / OUT: 750 ml / NET: -188 ml          NEW LABS:                          9.1    6.3   )-----------( 391      ( 07 Jun 2017 04:44 )             30.3     06-07    130<L>  |  92<L>  |  56<H>  ----------------------------<  97  4.3   |  21<L>  |  2.06<H>    Ca    9.0      07 Jun 2017 20:12  Phos  4.8     06-07  Mg     2.6     06-07    TPro  6.9  /  Alb  3.4  /  TBili  1.7<H>  /  DBili  x   /  AST  127<H>  /  ALT  78<H>  /  AlkPhos  99  06-07    PT/INR - ( 07 Jun 2017 16:43 )   PT: 45.6 sec;   INR: 4.10 ratio         PTT - ( 07 Jun 2017 16:43 )  PTT:39.3 sec            PLAN: ===================  CCU MIDNIGHT ROUNDS  ===================    BILLY RUSSELL  [id number]  67y  Male    =========  SUMMARY:  =========    67 year old man history of advanced non-ischemic cardiomyopathy , bi-ventricular HFreF(EF15% 5/2017) s/p AICD on home milrinone 3mcg/kg/min, PAF on AC, VT, recent hospitalization  5/2017 for AICD firing with admission to the CCU presents with dizziness, N/V x 1 day after exertion a/w ADHF with CCU transfer for pressure support diuresis.    NEW EVENTS:  Found to have supratherapeutic INR this AM. Given vitamin K to reverse INR, and last INR down to 4 from 7. Anticoagulation held. Worsening renal fxn. Renal c/s suggesting cardiorenal with component of post obstruction. Rec taper lasix drip, check renal and bladder sonogram. Monitor BMP, strict I/O, avoid nephrotoxics, NSAIDs.     ==============  UPDATED VITALS:  ==============    ICU Vital Signs Last 24 Hrs  T(C): 36.4, Max: 36.9 (06-07 @ 07:00)  T(F): 97.6, Max: 98.4 (06-07 @ 07:00)  HR: 102 (88 - 102)  BP: 85/65 (72/55 - 100/78)  BP(mean): 76 (60 - 109)  ABP: --  ABP(mean): --  RR: 23 (11 - 29)  SpO2: 95% (95% - 100%)      I&O's Summary  I & Os for 24h ending 07 Jun 2017 07:00  =============================================  IN: 1150.8 ml / OUT: 800 ml / NET: 350.8 ml    I & Os for current day (as of 07 Jun 2017 22:33)  =============================================  IN: 562 ml / OUT: 750 ml / NET: -188 ml    =========  NEW LABS:  =========                        9.1    6.3   )-----------( 391      ( 07 Jun 2017 04:44 )             30.3     06-07    130<L>  |  92<L>  |  56<H>  ----------------------------<  97  4.3   |  21<L>  |  2.06<H>    Ca    9.0      07 Jun 2017 20:12  Phos  4.8     06-07  Mg     2.6     06-07    Lactate 2.1    TPro  6.9  /  Alb  3.4  /  TBili  1.7<H>  /  DBili  x   /  AST  127<H>  /  ALT  78<H>  /  AlkPhos  99  06-07    PT/INR - ( 07 Jun 2017 16:43 )   PT: 45.6 sec;   INR: 4.10 ratio         PTT - ( 07 Jun 2017 16:43 )  PTT:39.3 sec    =====  PLAN:  =====  - c/w diuresis, monitor BMP q6hrs; consider titrating down lasix drip per renal recs  - Pending RHC  - Pending LVAD w/u  - Anemia w/u

## 2017-06-08 DIAGNOSIS — D64.9 ANEMIA, UNSPECIFIED: ICD-10-CM

## 2017-06-08 DIAGNOSIS — E87.1 HYPO-OSMOLALITY AND HYPONATREMIA: ICD-10-CM

## 2017-06-08 DIAGNOSIS — E87.79 OTHER FLUID OVERLOAD: ICD-10-CM

## 2017-06-08 LAB
ALBUMIN SERPL ELPH-MCNC: 3.3 G/DL — SIGNIFICANT CHANGE UP (ref 3.3–5)
ALBUMIN SERPL ELPH-MCNC: 3.6 G/DL — SIGNIFICANT CHANGE UP (ref 3.3–5)
ALP SERPL-CCNC: 102 U/L — SIGNIFICANT CHANGE UP (ref 40–120)
ALP SERPL-CCNC: 96 U/L — SIGNIFICANT CHANGE UP (ref 40–120)
ALT FLD-CCNC: 113 U/L RC — HIGH (ref 10–45)
ALT FLD-CCNC: 91 U/L RC — HIGH (ref 10–45)
ANION GAP SERPL CALC-SCNC: 17 MMOL/L — SIGNIFICANT CHANGE UP (ref 5–17)
ANION GAP SERPL CALC-SCNC: 18 MMOL/L — HIGH (ref 5–17)
APTT BLD: 35.3 SEC — SIGNIFICANT CHANGE UP (ref 27.5–37.4)
APTT BLD: 37.7 SEC — HIGH (ref 27.5–37.4)
AST SERPL-CCNC: 142 U/L — HIGH (ref 10–40)
AST SERPL-CCNC: 184 U/L — HIGH (ref 10–40)
BASOPHILS # BLD AUTO: 0 K/UL — SIGNIFICANT CHANGE UP (ref 0–0.2)
BASOPHILS NFR BLD AUTO: 0.3 % — SIGNIFICANT CHANGE UP (ref 0–2)
BILIRUB SERPL-MCNC: 1.9 MG/DL — HIGH (ref 0.2–1.2)
BILIRUB SERPL-MCNC: 1.9 MG/DL — HIGH (ref 0.2–1.2)
BUN SERPL-MCNC: 53 MG/DL — HIGH (ref 7–23)
BUN SERPL-MCNC: 54 MG/DL — HIGH (ref 7–23)
CALCIUM SERPL-MCNC: 8.8 MG/DL — SIGNIFICANT CHANGE UP (ref 8.4–10.5)
CALCIUM SERPL-MCNC: 8.9 MG/DL — SIGNIFICANT CHANGE UP (ref 8.4–10.5)
CHLORIDE SERPL-SCNC: 92 MMOL/L — LOW (ref 96–108)
CHLORIDE SERPL-SCNC: 93 MMOL/L — LOW (ref 96–108)
CO2 SERPL-SCNC: 20 MMOL/L — LOW (ref 22–31)
CO2 SERPL-SCNC: 21 MMOL/L — LOW (ref 22–31)
CREAT SERPL-MCNC: 1.93 MG/DL — HIGH (ref 0.5–1.3)
CREAT SERPL-MCNC: 1.93 MG/DL — HIGH (ref 0.5–1.3)
D DIMER BLD IA.RAPID-MCNC: 1095 NG/ML DDU — HIGH
EOSINOPHIL # BLD AUTO: 0.1 K/UL — SIGNIFICANT CHANGE UP (ref 0–0.5)
EOSINOPHIL NFR BLD AUTO: 0.9 % — SIGNIFICANT CHANGE UP (ref 0–6)
FIBRINOGEN PPP-MCNC: 291 MG/DL — LOW (ref 310–510)
FOLATE SERPL-MCNC: >20 NG/ML — SIGNIFICANT CHANGE UP (ref 4.8–24.2)
GLUCOSE SERPL-MCNC: 84 MG/DL — SIGNIFICANT CHANGE UP (ref 70–99)
GLUCOSE SERPL-MCNC: 94 MG/DL — SIGNIFICANT CHANGE UP (ref 70–99)
HAPTOGLOB SERPL-MCNC: 70 MG/DL — SIGNIFICANT CHANGE UP (ref 34–200)
HCT VFR BLD CALC: 29.9 % — LOW (ref 39–50)
HGB BLD-MCNC: 8.9 G/DL — LOW (ref 13–17)
INR BLD: 2.75 RATIO — HIGH (ref 0.88–1.16)
INR BLD: 3.46 RATIO — HIGH (ref 0.88–1.16)
IRON SATN MFR SERPL: 20 UG/DL — LOW (ref 45–165)
IRON SATN MFR SERPL: 6 % — LOW (ref 16–55)
LACTATE SERPL-SCNC: 2.2 MMOL/L — HIGH (ref 0.7–2)
LDH SERPL L TO P-CCNC: 307 U/L — HIGH (ref 50–242)
LYMPHOCYTES # BLD AUTO: 1.4 K/UL — SIGNIFICANT CHANGE UP (ref 1–3.3)
LYMPHOCYTES # BLD AUTO: 25.5 % — SIGNIFICANT CHANGE UP (ref 13–44)
MAGNESIUM SERPL-MCNC: 2.7 MG/DL — HIGH (ref 1.6–2.6)
MCHC RBC-ENTMCNC: 26.1 PG — LOW (ref 27–34)
MCHC RBC-ENTMCNC: 29.8 GM/DL — LOW (ref 32–36)
MCV RBC AUTO: 87.5 FL — SIGNIFICANT CHANGE UP (ref 80–100)
MONOCYTES # BLD AUTO: 0.8 K/UL — SIGNIFICANT CHANGE UP (ref 0–0.9)
MONOCYTES NFR BLD AUTO: 14.4 % — HIGH (ref 2–14)
NEUTROPHILS # BLD AUTO: 3.3 K/UL — SIGNIFICANT CHANGE UP (ref 1.8–7.4)
NEUTROPHILS NFR BLD AUTO: 58.8 % — SIGNIFICANT CHANGE UP (ref 43–77)
PHOSPHATE SERPL-MCNC: 4.5 MG/DL — SIGNIFICANT CHANGE UP (ref 2.5–4.5)
PHOSPHATE SERPL-MCNC: 4.5 MG/DL — SIGNIFICANT CHANGE UP (ref 2.5–4.5)
PLATELET # BLD AUTO: 361 K/UL — SIGNIFICANT CHANGE UP (ref 150–400)
POTASSIUM SERPL-MCNC: 4.2 MMOL/L — SIGNIFICANT CHANGE UP (ref 3.5–5.3)
POTASSIUM SERPL-MCNC: 4.3 MMOL/L — SIGNIFICANT CHANGE UP (ref 3.5–5.3)
POTASSIUM SERPL-SCNC: 4.2 MMOL/L — SIGNIFICANT CHANGE UP (ref 3.5–5.3)
POTASSIUM SERPL-SCNC: 4.3 MMOL/L — SIGNIFICANT CHANGE UP (ref 3.5–5.3)
PROT SERPL-MCNC: 6.9 G/DL — SIGNIFICANT CHANGE UP (ref 6–8.3)
PROT SERPL-MCNC: 7.1 G/DL — SIGNIFICANT CHANGE UP (ref 6–8.3)
PROTHROM AB SERPL-ACNC: 30.6 SEC — HIGH (ref 9.8–12.7)
PROTHROM AB SERPL-ACNC: 38.3 SEC — HIGH (ref 9.8–12.7)
RBC # BLD: 3.42 M/UL — LOW (ref 4.2–5.8)
RBC # FLD: 19.6 % — HIGH (ref 10.3–14.5)
SODIUM SERPL-SCNC: 130 MMOL/L — LOW (ref 135–145)
SODIUM SERPL-SCNC: 130 MMOL/L — LOW (ref 135–145)
SODIUM SERPL-SCNC: 131 MMOL/L — LOW (ref 135–145)
TIBC SERPL-MCNC: 352 UG/DL — SIGNIFICANT CHANGE UP (ref 220–430)
TRANSFERRIN SERPL-MCNC: 261 MG/DL — SIGNIFICANT CHANGE UP (ref 200–360)
UIBC SERPL-MCNC: 332 UG/DL — SIGNIFICANT CHANGE UP (ref 110–370)
VIT B12 SERPL-MCNC: >2000 PG/ML — HIGH (ref 243–894)
WBC # BLD: 5.7 K/UL — SIGNIFICANT CHANGE UP (ref 3.8–10.5)
WBC # FLD AUTO: 5.7 K/UL — SIGNIFICANT CHANGE UP (ref 3.8–10.5)

## 2017-06-08 PROCEDURE — 93010 ELECTROCARDIOGRAM REPORT: CPT

## 2017-06-08 PROCEDURE — 99233 SBSQ HOSP IP/OBS HIGH 50: CPT | Mod: GC

## 2017-06-08 PROCEDURE — 99233 SBSQ HOSP IP/OBS HIGH 50: CPT

## 2017-06-08 RX ORDER — PHYTONADIONE (VIT K1) 5 MG
5 TABLET ORAL DAILY
Qty: 0 | Refills: 0 | Status: DISCONTINUED | OUTPATIENT
Start: 2017-06-08 | End: 2017-06-08

## 2017-06-08 RX ORDER — PHYTONADIONE (VIT K1) 5 MG
5 TABLET ORAL ONCE
Qty: 0 | Refills: 0 | Status: COMPLETED | OUTPATIENT
Start: 2017-06-08 | End: 2017-06-08

## 2017-06-08 RX ORDER — PHYTONADIONE (VIT K1) 5 MG
5 TABLET ORAL DAILY
Qty: 0 | Refills: 0 | Status: DISCONTINUED | OUTPATIENT
Start: 2017-06-08 | End: 2017-06-10

## 2017-06-08 RX ADMIN — MEXILETINE HYDROCHLORIDE 150 MILLIGRAM(S): 150 CAPSULE ORAL at 13:25

## 2017-06-08 RX ADMIN — MEXILETINE HYDROCHLORIDE 150 MILLIGRAM(S): 150 CAPSULE ORAL at 21:38

## 2017-06-08 RX ADMIN — Medication 100 MILLIGRAM(S): at 05:54

## 2017-06-08 RX ADMIN — MILRINONE LACTATE 11.25 MICROGRAM(S)/KG/MIN: 1 INJECTION, SOLUTION INTRAVENOUS at 05:57

## 2017-06-08 RX ADMIN — Medication 25 MILLIGRAM(S): at 11:40

## 2017-06-08 RX ADMIN — Medication 500 MILLIGRAM(S): at 11:40

## 2017-06-08 RX ADMIN — Medication 1 TABLET(S): at 11:40

## 2017-06-08 RX ADMIN — Medication 7.5 MG/HR: at 11:40

## 2017-06-08 RX ADMIN — SENNA PLUS 2 TABLET(S): 8.6 TABLET ORAL at 21:41

## 2017-06-08 RX ADMIN — MEXILETINE HYDROCHLORIDE 150 MILLIGRAM(S): 150 CAPSULE ORAL at 05:57

## 2017-06-08 RX ADMIN — MILRINONE LACTATE 11.25 MICROGRAM(S)/KG/MIN: 1 INJECTION, SOLUTION INTRAVENOUS at 11:40

## 2017-06-08 RX ADMIN — Medication 1 MILLIGRAM(S): at 11:40

## 2017-06-08 RX ADMIN — Medication 5 MILLIGRAM(S): at 17:05

## 2017-06-08 RX ADMIN — Medication 7.5 MG/HR: at 23:11

## 2017-06-08 RX ADMIN — MILRINONE LACTATE 11.25 MICROGRAM(S)/KG/MIN: 1 INJECTION, SOLUTION INTRAVENOUS at 23:12

## 2017-06-08 RX ADMIN — Medication 101 MILLIGRAM(S): at 13:19

## 2017-06-08 RX ADMIN — Medication 100 MILLIGRAM(S): at 21:41

## 2017-06-08 RX ADMIN — Medication 7.5 MG/HR: at 05:57

## 2017-06-08 NOTE — CHART NOTE - NSCHARTNOTEFT_GEN_A_CORE
====================  CCU MIDNIGHT ROUNDS  ====================    BILLY RUSSELL  [id number]  67y  Male    ====================  SUMMARY:  ====================    67 year old man history of advanced non-ischemic cardiomyopathy , bi-ventricular HFreF(EF15% 5/2017) s/p AICD on home milrinone 3mcg/kg/min, PAF on AC, VT, recent hospitalization  5/2017 for AICD firing with admission to the CCU presents with dizziness, N/V x 1 day after exertion a/w ADHF.    ====================  NEW EVENTS:  ====================    Patient complained of typical epigastric burning pain, relieved with maalox. Net negative 678cc last 24hrs. Pending right heart cath to evaluate for LVAD. Still on lasix drip at 15 and Milrinone at 0.5mcg/kg/min.    ====================  VITALS (Last 12 hrs):  ====================    T(C): 36.7, Max: 36.7 (06-08 @ 19:30)  T(F): 98, Max: 98 (06-08 @ 19:30)  HR: 100 (94 - 104)  BP: 85/74 (83/72 - 99/78)  BP(mean): 79 (74 - 88)  ABP: --  ABP(mean): --  RR: 20 (12 - 24)  SpO2: 94% (84% - 97%)  Wt(kg): --  CVP(mm Hg): --  CVP(cm H2O): --  CO: --  CI: --  PA: --  PA(mean): --  PCWP: --  SVR: --  PVR: --      I&O's Summary  I & Os for 24h ending 08 Jun 2017 07:00  =============================================  IN: 851.2 ml / OUT: 1050 ml / NET: -198.8 ml    I & Os for current day (as of 08 Jun 2017 22:11)  =============================================  IN: 772 ml / OUT: 1450 ml / NET: -678 ml          ====================  NEW LABS:  ====================                          8.9    5.7   )-----------( 361      ( 08 Jun 2017 03:10 )             29.9     06-08    130<L>  |  92<L>  |  53<H>  ----------------------------<  94  4.3   |  20<L>  |  1.93<H>    Ca    8.8      08 Jun 2017 17:33  Phos  4.5     06-08  Mg     2.7     06-08    TPro  7.1  /  Alb  3.6  /  TBili  1.9<H>  /  DBili  x   /  AST  184<H>  /  ALT  113<H>  /  AlkPhos  102  06-08    PT/INR - ( 08 Jun 2017 14:33 )   PT: 30.6 sec;   INR: 2.75 ratio       PTT - ( 08 Jun 2017 14:33 )  PTT:35.3 sec      ====================  PLAN:  ====================  - c/w diuresis w/ lasix  - plan for RHC tomorrow AM  - c/w eval for LVAD ====================  CCU MIDNIGHT ROUNDS  ====================    BILLY RUSSELL  [id number]  67y  Male    ====================  SUMMARY:  ====================    67 year old man history of advanced non-ischemic cardiomyopathy , bi-ventricular HFreF(EF15% 5/2017) s/p AICD on home milrinone 3mcg/kg/min, PAF on AC, VT, recent hospitalization  5/2017 for AICD firing with admission to the CCU presents with dizziness, N/V x 1 day after exertion a/w ADHF.    ====================  NEW EVENTS:  ====================    Patient complained of typical epigastric burning pain, relieved with maalox. Net negative 678cc last 24hrs. Pending right heart cath to evaluate for LVAD. Still on lasix drip at 15 and Milrinone at 0.5mcg/kg/min. INR downtrending after vitamin K and K centra.    ====================  VITALS (Last 12 hrs):  ====================    T(C): 36.7, Max: 36.7 (06-08 @ 19:30)  T(F): 98, Max: 98 (06-08 @ 19:30)  HR: 100 (94 - 104)  BP: 85/74 (83/72 - 99/78)  BP(mean): 79 (74 - 88)  ABP: --  ABP(mean): --  RR: 20 (12 - 24)  SpO2: 94% (84% - 97%)  Wt(kg): --  CVP(mm Hg): --  CVP(cm H2O): --  CO: --  CI: --  PA: --  PA(mean): --  PCWP: --  SVR: --  PVR: --      I&O's Summary  I & Os for 24h ending 08 Jun 2017 07:00  =============================================  IN: 851.2 ml / OUT: 1050 ml / NET: -198.8 ml    I & Os for current day (as of 08 Jun 2017 22:11)  =============================================  IN: 772 ml / OUT: 1450 ml / NET: -678 ml          ====================  NEW LABS:  ====================                          8.9    5.7   )-----------( 361      ( 08 Jun 2017 03:10 )             29.9     06-08    130<L>  |  92<L>  |  53<H>  ----------------------------<  94  4.3   |  20<L>  |  1.93<H>    Ca    8.8      08 Jun 2017 17:33  Phos  4.5     06-08  Mg     2.7     06-08    TPro  7.1  /  Alb  3.6  /  TBili  1.9<H>  /  DBili  x   /  AST  184<H>  /  ALT  113<H>  /  AlkPhos  102  06-08    PT/INR - ( 08 Jun 2017 14:33 )   PT: 30.6 sec;   INR: 2.75 ratio       PTT - ( 08 Jun 2017 14:33 )  PTT:35.3 sec      ====================  PLAN:  ====================  - c/w diuresis w/ lasix  - plan for RHC tomorrow AM  - c/w eval for LVAD

## 2017-06-08 NOTE — PROGRESS NOTE ADULT - PROBLEM SELECTOR PLAN 3
Patient with INR of 7. No signs of active bleeding.   Will hold Xarelto at this time.   Daily coags. s/p KCentra an 1 dose of Vit K yesterday. INR today 3.46.  Mixing Studies showed improved correction of PT pathway. No improvement in aPPT pathway. Concern for possible inhibitor  Hematology recommendations appreciated: If mixing studies abnormal send FV, X, XIII. Trial of Vitamin K PO QD.   Follow up daily  Heme recommendations.    Patient with INR of 7. No signs of active bleeding.   Continue to Xarelto at this time.   Daily LFTs.

## 2017-06-08 NOTE — PROGRESS NOTE ADULT - ASSESSMENT
68y/o M with NICM BiV HFrEF 15-20% on a stable dose of milrinone at 3mcg/kg/min via PICC s/p AICD, with replacement from [facility] to [employer] (2009), PAF on AC and h/o VT, with recent admission for AICD firing, now presenting with dizziness and nausea/vomiting X 1d, now with increased milrinone requirement and elevated INR.  - C/w milrinone @ 0.500 for now, plan for RHC today if INR comes down  - JVP difficult to assess would c/w Lasix gtt as is for now  - Continue to discuss LVAD eval

## 2017-06-08 NOTE — PROGRESS NOTE ADULT - ATTENDING COMMENTS
Patient is seen and examined with housestaff and fellow. Agree with assessment/plan.  continue with current medical management  plan for RHC today

## 2017-06-08 NOTE — PROGRESS NOTE ADULT - PROBLEM SELECTOR PLAN 7
Xarelto held in setting of therapeutic INR. Xarelto held in setting of therapeutic INR. Plan for RHC today. Likely secondary to volume overload.  Stable.   No mental status changes.   Continue diuresis. Will monitor.

## 2017-06-08 NOTE — PROGRESS NOTE ADULT - PROBLEM SELECTOR PLAN 5
Likely in setting of hepatic congestion due to ADHF. Denies  abdominal pain.   Continue diuresis.  Will hold off on Abdominal US at this time. Mild elevation today. Denies abdominal pain this AM.   Likely in setting of hepatic congestion due to ADHF. Hepatitis panel negative.  Abdominal U/S showed no evidence of acute liver etiology. Negative for cirrhosis.    Trend LFTS.

## 2017-06-08 NOTE — PROGRESS NOTE ADULT - PROBLEM SELECTOR PLAN 1
Patient with severe global LVSD with EF 15% in May. On Milrinone at home.  Currently net positive 350 with 1L out in 24hours.  Continue Milrinone gtt. Continue Lasix at 15cc/hr. Consider Metolazone today.  Strict I/Os. Daily Weights. Patient with severe global LVSD with EF 15% in May. On Milrinone at home.  Patient net -217.6. Denies CP or SOB.    Continue Lasix gtt and Milrinone. BMP q6hrs- replete electrolytes PRN.   Plan for RHC today give worsening HF during admission.  Continue LVAD evaluation.   Follow up Heart Failure recommendations.   Strict I/Os. Daily Weights.

## 2017-06-08 NOTE — PROGRESS NOTE ADULT - PROBLEM SELECTOR PLAN 1
hemodynamically mediated in setting of decompensated heart failure. Pt with elevated but stable Scr at 1.93 today. Pt non -oliguric ( Urine outpt 1050). Advice to taper lasix drip, check renal and bladder sonogram. Monitor BMP, strict I/O, avoid nephrotoxics, NSAIDs, RCA.

## 2017-06-08 NOTE — PROGRESS NOTE ADULT - ATTENDING COMMENTS
Milrinone 0.5, Lasix 15/hr. Toprol XL 25,   I/O -198. Cr 1.9 (best 1.2 at discharge May 19th). INR 3.4  Exam  BP 81/59 - 100/78  still SOB at rest.   Concerned that Cr still not returned to baseline.   For RHC when INR favorable. Accelerated LVAD eval. (dental, GI, ? chest CT, anemia work up)  Marvin Campbell

## 2017-06-08 NOTE — PROGRESS NOTE ADULT - PROBLEM SELECTOR PLAN 4
Rate control. Continue with Toprol XL 25mg QD. Will up-titrate if necessary.   Xarelto held in setting of supra therapeutic INR. Rate control. Continue with Toprol XL 25mg QD.   Xarelto held in setting of supra therapeutic INR. Plan for RHC today.

## 2017-06-08 NOTE — PROGRESS NOTE ADULT - SUBJECTIVE AND OBJECTIVE BOX
[facility] DIVISION OF KIDNEY DISEASES AND HYPERTENSION -- FOLLOW UP NOTE  --------------------------------------------------------------------------------    24hour events/Subjective: no overnight events.   Pt seen and examined. denies chest pain/sob.         PAST HISTORY  --------------------------------------------------------------------------------  No significant changes to PMH, PSH, FHx, SHx, unless otherwise noted    ALLERGIES & MEDICATIONS  --------------------------------------------------------------------------------  Allergies    No Known Allergies    Intolerances      Standing Inpatient Medications  mexiletine 150milliGRAM(s) Oral three times a day  docusate sodium 100milliGRAM(s) Oral three times a day  senna 2Tablet(s) Oral at bedtime  multivitamin/minerals 1Tablet(s) Oral daily  calcium carbonate  625 mG + Vitamin D (OsCal 250 + D) 1Tablet(s) Oral daily  folic acid 1milliGRAM(s) Oral daily  ascorbic acid 500milliGRAM(s) Oral daily  metoprolol succinate ER 25milliGRAM(s) Oral daily  furosemide Infusion 15mG/Hr IV Continuous <Continuous>  milrinone Infusion 0.5MICROgram(s)/kG/Min IV Continuous <Continuous>    PRN Inpatient Medications  bisacodyl 5milliGRAM(s) Oral every 12 hours PRN  aluminum hydroxide/magnesium hydroxide/simethicone Suspension 30milliLiter(s) Oral every 6 hours PRN      REVIEW OF SYSTEMS  --------------------------------------------------------------------------------  As above         VITALS/PHYSICAL EXAM  --------------------------------------------------------------------------------  T(C): 36.1, Max: 36.8 (06-07 @ 14:00)  HR: 100 (90 - 102)  BP: 87/68 (81/59 - 100/78)  RR: 24 (15 - 29)  SpO2: 94% (93% - 95%)  Wt(kg): --  Height (cm): 172.7 (06-06-17 @ 21:20)  Weight (kg): 75 (06-06-17 @ 21:20)  BMI (kg/m2): 25.1 (06-06-17 @ 21:20)  BSA (m2): 1.88 (06-06-17 @ 21:20)    I & Os for 24h ending 06-08-17 @ 07:00  =============================================  IN: 851.2 ml / OUT: 1050 ml / NET: -198.8 ml    I & Os for current day (as of 06-08-17 @ 08:28)  =============================================  IN: 18.8 ml / OUT: 0 ml / NET: 18.8 ml    Physical Exam:  	Gen: NAD  	HEENT: MMM  	Pulm: CTA B/L  	CV: S1S2  	Abd: Soft, +BS  	Ext: No LE edema B/L                      Neuro: Awake   	Skin: Warm and Dry   	Gu: no emily     LABS/STUDIES  --------------------------------------------------------------------------------              8.9    5.7   >-----------<  361      [06-08-17 @ 03:10]              29.9     131  |  93  |  54  ----------------------------<  84      [06-08-17 @ 03:10]  4.2   |  21  |  1.93        Ca     8.9     [06-08-17 @ 03:10]      Mg     2.7     [06-08-17 @ 03:10]      Phos  4.5     [06-08-17 @ 03:10]    TPro  6.9  /  Alb  3.3  /  TBili  1.9  /  DBili  x   /  AST  142  /  ALT  91  /  AlkPhos  96  [06-08-17 @ 03:10]    PT/INR: PT 38.3 , INR 3.46       [06-08-17 @ 03:10]  PTT: 37.7       [06-08-17 @ 03:10]          [06-08-17 @ 03:10]    Creatinine Trend:  SCr 1.93 [06-08 @ 03:10]  SCr 2.06 [06-07 @ 20:12]  SCr 2.06 [06-07 @ 13:39]  SCr 2.15 [06-07 @ 04:44]  SCr 2.08 [06-06 @ 23:27]    Urinalysis - [06-03-17 @ 10:46]      Color Mary / Appearance Clear / SG 1.012 / pH 5.0      Gluc Negative / Ketone Negative  / Bili Negative / Urobili 1.0       Blood Negative / Protein Negative / Leuk Est Negative / Nitrite Negative      RBC 2 / WBC 1 / Hyaline 6 / Gran  / Sq Epi  / Non Sq Epi 0 / Bacteria Negative    Urine Creatinine 131      [06-03-17 @ 10:46]  Urine Protein 24      [06-03-17 @ 10:46]  Urine Sodium <20      [06-03-17 @ 10:46]  Urine Urea Nitrogen 499      [06-03-17 @ 10:46]    Iron 26, TIBC 252, %sat 10      [05-15-17 @ 13:47]  Ferritin 112.0      [05-15-17 @ 13:47]  HbA1c 5.5      [06-07-17 @ 06:14]  TSH 3.54      [06-07-17 @ 06:14]  Lipid: chol 62, TG 33, HDL 17, LDL 38      [06-07-17 @ 06:14]    HBsAb Nonreact      [05-15-17 @ 15:40]  HBsAg Nonreact      [05-15-17 @ 15:40]  HBcAb Nonreact      [05-15-17 @ 15:40]  HCV 0.17, Nonreact      [05-15-17 @ 15:40]  HIV Nonreact      [05-15-17 @ 13:47] [facility] DIVISION OF KIDNEY DISEASES AND HYPERTENSION -- FOLLOW UP NOTE  --------------------------------------------------------------------------------    24hour events/Subjective: no overnight events. feels better, more urine output today  Pt seen and examined. denies chest pain/sob.         PAST HISTORY  --------------------------------------------------------------------------------  No significant changes to PMH, PSH, FHx, SHx, unless otherwise noted    ALLERGIES & MEDICATIONS  --------------------------------------------------------------------------------  Allergies    No Known Allergies    Intolerances      Standing Inpatient Medications  mexiletine 150milliGRAM(s) Oral three times a day  docusate sodium 100milliGRAM(s) Oral three times a day  senna 2Tablet(s) Oral at bedtime  multivitamin/minerals 1Tablet(s) Oral daily  calcium carbonate  625 mG + Vitamin D (OsCal 250 + D) 1Tablet(s) Oral daily  folic acid 1milliGRAM(s) Oral daily  ascorbic acid 500milliGRAM(s) Oral daily  metoprolol succinate ER 25milliGRAM(s) Oral daily  furosemide Infusion 15mG/Hr IV Continuous <Continuous>  milrinone Infusion 0.5MICROgram(s)/kG/Min IV Continuous <Continuous>    PRN Inpatient Medications  bisacodyl 5milliGRAM(s) Oral every 12 hours PRN  aluminum hydroxide/magnesium hydroxide/simethicone Suspension 30milliLiter(s) Oral every 6 hours PRN      REVIEW OF SYSTEMS  --------------------------------------------------------------------------------  As above, all others negative        VITALS/PHYSICAL EXAM  --------------------------------------------------------------------------------  T(C): 36.1, Max: 36.8 (06-07 @ 14:00)  HR: 100 (90 - 102)  BP: 87/68 (81/59 - 100/78)  RR: 24 (15 - 29)  SpO2: 94% (93% - 95%)  Wt(kg): --  Height (cm): 172.7 (06-06-17 @ 21:20)  Weight (kg): 75 (06-06-17 @ 21:20)  BMI (kg/m2): 25.1 (06-06-17 @ 21:20)  BSA (m2): 1.88 (06-06-17 @ 21:20)    I & Os for 24h ending 06-08-17 @ 07:00  =============================================  IN: 851.2 ml / OUT: 1050 ml / NET: -198.8 ml    I & Os for current day (as of 06-08-17 @ 08:28)  =============================================  IN: 18.8 ml / OUT: 0 ml / NET: 18.8 ml    Physical Exam:  	Gen: NAD  	HEENT: MMM, no jvp  	Pulm: CTA B/L  	CV: S1S2, no rub  	Abd: Soft, +BS  	Ext: No LE edema B/L             Neuro: Awake   	Skin: Warm and Dry   	Gu: no emily     LABS/STUDIES  --------------------------------------------------------------------------------              8.9    5.7   >-----------<  361      [06-08-17 @ 03:10]              29.9     131  |  93  |  54  ----------------------------<  84      [06-08-17 @ 03:10]  4.2   |  21  |  1.93        Ca     8.9     [06-08-17 @ 03:10]      Mg     2.7     [06-08-17 @ 03:10]      Phos  4.5     [06-08-17 @ 03:10]    TPro  6.9  /  Alb  3.3  /  TBili  1.9  /  DBili  x   /  AST  142  /  ALT  91  /  AlkPhos  96  [06-08-17 @ 03:10]    PT/INR: PT 38.3 , INR 3.46       [06-08-17 @ 03:10]  PTT: 37.7       [06-08-17 @ 03:10]          [06-08-17 @ 03:10]    Creatinine Trend:  SCr 1.93 [06-08 @ 03:10]  SCr 2.06 [06-07 @ 20:12]  SCr 2.06 [06-07 @ 13:39]  SCr 2.15 [06-07 @ 04:44]  SCr 2.08 [06-06 @ 23:27]    Urinalysis - [06-03-17 @ 10:46]      Color Mary / Appearance Clear / SG 1.012 / pH 5.0      Gluc Negative / Ketone Negative  / Bili Negative / Urobili 1.0       Blood Negative / Protein Negative / Leuk Est Negative / Nitrite Negative      RBC 2 / WBC 1 / Hyaline 6 / Gran  / Sq Epi  / Non Sq Epi 0 / Bacteria Negative    Urine Creatinine 131      [06-03-17 @ 10:46]  Urine Protein 24      [06-03-17 @ 10:46]  Urine Sodium <20      [06-03-17 @ 10:46]  Urine Urea Nitrogen 499      [06-03-17 @ 10:46]    Iron 26, TIBC 252, %sat 10      [05-15-17 @ 13:47]  Ferritin 112.0      [05-15-17 @ 13:47]  HbA1c 5.5      [06-07-17 @ 06:14]  TSH 3.54      [06-07-17 @ 06:14]  Lipid: chol 62, TG 33, HDL 17, LDL 38      [06-07-17 @ 06:14]    HBsAb Nonreact      [05-15-17 @ 15:40]  HBsAg Nonreact      [05-15-17 @ 15:40]  HBcAb Nonreact      [05-15-17 @ 15:40]  HCV 0.17, Nonreact      [05-15-17 @ 15:40]  HIV Nonreact      [05-15-17 @ 13:47]

## 2017-06-08 NOTE — PROGRESS NOTE ADULT - ATTENDING COMMENTS
NORMAN- cardiorenal on Lasix, now euvolumic- decreasing Lasix drip, creatinine improving    mild urinary retention yesterday with improvement in urine output today check-Bladder scan today for pvr    Hypervolumia- lasix drip; monitor Is/Os    Met acidosis- monitor Hco3 trend daily.

## 2017-06-08 NOTE — PROGRESS NOTE ADULT - SUBJECTIVE AND OBJECTIVE BOX
Events: No acute events overnight. 	    MEDICATIONS  (STANDING):  mexiletine 150milliGRAM(s) Oral three times a day  docusate sodium 100milliGRAM(s) Oral three times a day  senna 2Tablet(s) Oral at bedtime  multivitamin/minerals 1Tablet(s) Oral daily  calcium carbonate  625 mG + Vitamin D (OsCal 250 + D) 1Tablet(s) Oral daily  folic acid 1milliGRAM(s) Oral daily  ascorbic acid 500milliGRAM(s) Oral daily  metoprolol succinate ER 25milliGRAM(s) Oral daily  furosemide Infusion 15mG/Hr IV Continuous <Continuous>  milrinone Infusion 0.5MICROgram(s)/kG/Min IV Continuous <Continuous>    MEDICATIONS  (PRN):  bisacodyl 5milliGRAM(s) Oral every 12 hours PRN Constipation  aluminum hydroxide/magnesium hydroxide/simethicone Suspension 30milliLiter(s) Oral every 6 hours PRN Dyspepsia      REVIEW OF SYSTEMS:  Otherwise, 10 point ROS done and otherwise negative.      Vital Signs Last 24 Hrs  T(C): 36.7, Max: 36.8 (06-07 @ 14:00)  T(F): 98, Max: 98.2 (06-07 @ 14:00)  HR: 100 (90 - 102)  BP: 93/75 (81/59 - 100/78)  BP(mean): 81 (65 - 109)  RR: 27 (15 - 29)  SpO2: 94% (93% - 95%)  I&O's Summary    I & Os for current day (as of 08 Jun 2017 07:37)  =============================================  IN: 832.4 ml / OUT: 1050 ml / NET: -217.6 ml    CAPILLARY BLOOD GLUCOSE      PHYSICAL EXAM:  Appearance: Normal	  HEENT:   Normal oral mucosa, PERRL, EOMI	  Lymphatic: No lymphadenopathy  Cardiovascular: Normal S1 S2, No JVD, No murmurs, No edema  Respiratory: Lungs clear to auscultation	  Psychiatry: A & O x 3, Mood & affect appropriate  Gastrointestinal:  Soft, Non-tender, + BS	  Skin: No rashes, No ecchymoses, No cyanosis	  Neurologic: Non-focal  Extremities: Normal range of motion, No clubbing, cyanosis or edema  Vascular: Peripheral pulses palpable 2+ bilaterally    LABS:                        8.9    5.7   )-----------( 361      ( 08 Jun 2017 03:10 )             29.9                         9.1    6.3   )-----------( 391      ( 07 Jun 2017 04:44 )             30.3     06-08    131<L>  |  93<L>  |  54<H>  ----------------------------<  84  4.2   |  21<L>  |  1.93<H>  06-07    130<L>  |  92<L>  |  56<H>  ----------------------------<  97  4.3   |  21<L>  |  2.06<H>    Ca    8.9      08 Jun 2017 03:10  Ca    9.0      07 Jun 2017 20:12  Phos  4.5     06-08  Mg     2.7     06-08    TPro  6.9  /  Alb  3.3  /  TBili  1.9<H>  /  DBili  x   /  AST  142<H>  /  ALT  91<H>  /  AlkPhos  96  06-08  TPro  6.9  /  Alb  3.4  /  TBili  1.7<H>  /  DBili  x   /  AST  127<H>  /  ALT  78<H>  /  AlkPhos  99  06-07  	 	    PT/INR - ( 08 Jun 2017 03:10 )   PT: 38.3 sec;   INR: 3.46 ratio    PTT - ( 08 Jun 2017 03:10 )  PTT:37.7 sec      TELEMETRY: 	    ECG:  	  RADIOLOGY: US Abdomen Unremarkable liver. Mild ascites. Events: No acute events overnight. Patient reports epigastric pain worse when lying down yesterday. Was given Maalox with relief. Denies any RUQ abdominal pain.  Denies CP or SOB at this time. Walks to the toilet without experiencing SOB. 	    MEDICATIONS  (STANDING):  mexiletine 150milliGRAM(s) Oral three times a day  docusate sodium 100milliGRAM(s) Oral three times a day  senna 2Tablet(s) Oral at bedtime  multivitamin/minerals 1Tablet(s) Oral daily  calcium carbonate  625 mG + Vitamin D (OsCal 250 + D) 1Tablet(s) Oral daily  folic acid 1milliGRAM(s) Oral daily  ascorbic acid 500milliGRAM(s) Oral daily  metoprolol succinate ER 25milliGRAM(s) Oral daily  furosemide Infusion 15mG/Hr IV Continuous <Continuous>  milrinone Infusion 0.5MICROgram(s)/kG/Min IV Continuous <Continuous>    MEDICATIONS  (PRN):  bisacodyl 5milliGRAM(s) Oral every 12 hours PRN Constipation  aluminum hydroxide/magnesium hydroxide/simethicone Suspension 30milliLiter(s) Oral every 6 hours PRN Dyspepsia      REVIEW OF SYSTEMS:  Otherwise, 10 point ROS done and otherwise negative.      Vital Signs Last 24 Hrs  T(C): 36.7, Max: 36.8 (06-07 @ 14:00)  T(F): 98, Max: 98.2 (06-07 @ 14:00)  HR: 100 (90 - 102)  BP: 93/75 (81/59 - 100/78)  BP(mean): 81 (65 - 109)  RR: 27 (15 - 29)  SpO2: 94% (93% - 95%)  I&O's Summary    I & Os for current day (as of 08 Jun 2017 07:37)  =============================================  IN: 832.4 ml / OUT: 1050 ml / NET: -217.6 ml    CAPILLARY BLOOD GLUCOSE      PHYSICAL EXAM:  Appearance: Normal	  HEENT:   Normal oral mucosa, PERRL, EOMI	  Lymphatic: No lymphadenopathy  Cardiovascular: Normal S1 S2, No JVD, No murmurs, No edema  Respiratory: Lungs clear to auscultation	  Psychiatry: A & O x 3, Mood & affect appropriate  Gastrointestinal:  Soft, Non-tender, + BS	  Skin: No rashes, No ecchymoses, No cyanosis	  Neurologic: Non-focal  Extremities: Normal range of motion, No clubbing, cyanosis or edema  Vascular: Peripheral pulses palpable 2+ bilaterally    LABS:                        8.9    5.7   )-----------( 361      ( 08 Jun 2017 03:10 )             29.9                         9.1    6.3   )-----------( 391      ( 07 Jun 2017 04:44 )             30.3     06-08    131<L>  |  93<L>  |  54<H>  ----------------------------<  84  4.2   |  21<L>  |  1.93<H>  06-07    130<L>  |  92<L>  |  56<H>  ----------------------------<  97  4.3   |  21<L>  |  2.06<H>    Ca    8.9      08 Jun 2017 03:10  Ca    9.0      07 Jun 2017 20:12  Phos  4.5     06-08  Mg     2.7     06-08    TPro  6.9  /  Alb  3.3  /  TBili  1.9<H>  /  DBili  x   /  AST  142<H>  /  ALT  91<H>  /  AlkPhos  96  06-08  TPro  6.9  /  Alb  3.4  /  TBili  1.7<H>  /  DBili  x   /  AST  127<H>  /  ALT  78<H>  /  AlkPhos  99  06-07  	 	    PT/INR - ( 08 Jun 2017 03:10 )   PT: 38.3 sec;   INR: 3.46 ratio    PTT - ( 08 Jun 2017 03:10 )  PTT:37.7 sec    Mixing Studies   Dilute Thrombin 27.2  %65.9  PT 50/50 21.5  APTT 100% 38.1  APTT 50/50 2 Hour Incubation 38.1  PAT CTL 2H 37.2  APTT 50/50 31.5      DDimer 1095  Fibrinogen 291    TELEMETRY: 	  PVCs  ECG:  pending 	  RADIOLOGY: US Abdomen Unremarkable liver. Mild ascites.

## 2017-06-08 NOTE — PROGRESS NOTE ADULT - ASSESSMENT
66y/o M with advanced NICM BiV HFrEF 15-20% on a stable dose of milrinone at 3mcg/kg/min via PICC s/p AICD, with replacement from [employer] to [employer] (2009), PAF on AC and h/o VT, with recent admission for AICD firing, now presenting with dizziness and nausea  likely decompensated heart failure.

## 2017-06-08 NOTE — PROGRESS NOTE ADULT - ASSESSMENT
67 year old man history of advanced non-ischemic cardiomyopathy , bi-ventricular HFreF(EF15% 5/2017) s/p AICD on home milrinone 3mcg/kg/min, PAF on AC, VT, recent hospitalization 5/2017 for AICD firing with admission to the CCU presents with dizziness, N/V x 1 day after exertion a/w ADHF with CCU transfer for pressure support diuresis. 67 year old man history of advanced non-ischemic cardiomyopathy , bi-ventricular HFreF(EF15% 5/2017) s/p AICD on home milrinone 3mcg/kg/min, PAF on AC, VT, recent hospitalization 5/2017 for AICD firing with admission to the CCU presents with dizziness, N/V x 1 day after exertion a/w ADHF with CCU transfer for pressure support diuresis. Hospital course c/b NORMAN likely 2/2 cardiorenal +/- postobstructive etiology, supratherapeutic INR of unclear etiology

## 2017-06-08 NOTE — PROGRESS NOTE ADULT - SUBJECTIVE AND OBJECTIVE BOX
LVAD Coordinator Note:      Patient is undergoing an evaluation for an LVAD. This evaluation started in his last admission.  I met  with the patient and his friend Tahira on Wednesday at length reviewing equipment and discussing post op care of VAD.  Patient is much more open to device in this admission.    To Complete Evaluation patient will need :  1. Dental Consult  2. Hematology consult for iron deficiency anemia.    I spoke with team.   I am away tomorrow 6/9 and 6/10 but available via phone [phone]

## 2017-06-08 NOTE — PROGRESS NOTE ADULT - PROBLEM SELECTOR PLAN 2
Like pre-renal in setting of ADHF. Low flow state.   Slight worsening today.  Continue aggressive diuresis.  If still worsening will send ULytes today.  Fe Urea since patient is on Lasix gtt. Improved today.   Likely cardiorenal in setting of ADHF +/- postobstructive etiology.  Continue Milrinone and Lasix gtt.  Bladders Scans.  Avoid nephrotoxins.  Strict I/Os.    Follow up Nephrology recommendations.

## 2017-06-08 NOTE — PROGRESS NOTE ADULT - PROBLEM SELECTOR PLAN 6
Likely in setting of hepatic congestion due to ADHF. Denies abdominal pain. No evidence of jaundice on exam.   Continue diuresis. Likely secondary to volume overload.  Stable.   No mental status changes.   Continue diuresis. Will monitor. Normocytic.  Follow up iron panel, folate, B12, LDH, Haptoglobin.

## 2017-06-08 NOTE — PROGRESS NOTE ADULT - SUBJECTIVE AND OBJECTIVE BOX
HPI:  68y/o M with advanced NICM BiV HFrEF 15-20% on a stable dose of milrinone at 3mcg/kg/min via PICC s/p AICD, with replacement from [facility] to [employer] (), PAF on AC and h/o VT, with recent admission for AICD firing, now presenting with dizziness and nausea/vomiting X 1 Day. Now with increased milrinone requirement.    INR still elevated, Pt otherwise w/o complaints currently.    Review Of Systems:  See above, otherwise, complete 10 point review of systems negative.    [ ]Unable to obtain    Medications:  mexiletine 150milliGRAM(s) Oral three times a day  docusate sodium 100milliGRAM(s) Oral three times a day  senna 2Tablet(s) Oral at bedtime  multivitamin/minerals 1Tablet(s) Oral daily  calcium carbonate  625 mG + Vitamin D (OsCal 250 + D) 1Tablet(s) Oral daily  folic acid 1milliGRAM(s) Oral daily  ascorbic acid 500milliGRAM(s) Oral daily  bisacodyl 5milliGRAM(s) Oral every 12 hours PRN  metoprolol succinate ER 25milliGRAM(s) Oral daily  furosemide Infusion 15mG/Hr IV Continuous <Continuous>  milrinone Infusion 0.5MICROgram(s)/kG/Min IV Continuous <Continuous>  aluminum hydroxide/magnesium hydroxide/simethicone Suspension 30milliLiter(s) Oral every 6 hours PRN    Vitals:  T(C): 36.1, Max: 36.8 ( @ 14:00)  HR: 102 (92 - 102)  BP: 95/69 (81/59 - 100/78)  BP(mean): 76 (65 - 109)  ABP: --  ABP(mean): --  RR: 14 (14 - 29)  SpO2: 94% (93% - 95%)  Wt(kg): --  CVP(cm H2O): --  CO: --  CI: --  PA: --  PA(mean): --  PCWP: --  SVR: --  PVR: --    Daily     Daily Weight in k.3 (2017 01:32)    I&O's Summary  I & Os for 24h ending 2017 07:00  =============================================  IN: 851.2 ml / OUT: 1050 ml / NET: -198.8 ml    I & Os for current day (as of 2017 10:52)  =============================================  IN: 37.6 ml / OUT: 0 ml / NET: 37.6 ml      Physical Exam:  Appearance: No Acute Distress  HEENT: JVP not well seen	  Cardiovascular: Normal S1 S2, No LE edema  Respiratory: Clear to auscultation bilaterally  Gastrointestinal:  Soft, Non-tender	  Skin: No cyanosis	  Neurologic: Non-focal  Extremities: No clubbing, cyanosis or edema  Vascular: 2+ DP/PT pulses bilateral  Psychiatry: A & O x 3, Mood & affect appropriate      Labs:                        8.9    5.7   )-----------( 361      ( 2017 03:10 )             29.9     06-08    131<L>  |  93<L>  |  54<H>  ----------------------------<  84  4.2   |  21<L>  |  1.93<H>    Ca    8.9      2017 03:10  Phos  4.5     -  Mg     2.7     -    TPro  6.9  /  Alb  3.3  /  TBili  1.9<H>  /  DBili  x   /  AST  142<H>  /  ALT  91<H>  /  AlkPhos  96  -    PT/INR - ( 2017 03:10 )   PT: 38.3 sec;   INR: 3.46 ratio         PTT - ( 2017 03:10 )  PTT:37.7 sec      Serum Pro-Brain Natriuretic Peptide: 3424 pg/mL ( @ 17:42)      Lactate Dehydrogenase, Serum: 307 U/L ( @ 03:10)          TELEMETRY: 	    ECG:      [ ] Echocardiogram:    -----------------------------------------------------------------  [ ] Congestive Heart Failure                  [ ] Acute                                                [ ] Acute on Chronic     [ ] Chronic  [ ] Diastolic (HFpEF)  [ ] Systolic (HFrEF)  [ ] Combined Systolic & Diastolic      [ ] ACC/AHA Stage: _____     [ ] NYHA Class: _____  -----------------------------------------------------------------  [ ] NORMAN                                                    [ ] CKD I  [ ] ATN                                                  [ ] CKD II  [ ] Other: _____                                  [ ] CKD III                                                                [ ] CKD IV                                                                [ ] CKD V                                                                [ ] ESRD  -----------------------------------------------------------------  Abnormal Nutritional Status:  [ ] Malnutrtion (see nutrition note)  [ ] Underweight: BMI < 19  [ ] Morbid Obeisty: BMI >/ 40  [ ] Cachexia  [ ] Other

## 2017-06-09 LAB
ALBUMIN SERPL ELPH-MCNC: 3.4 G/DL — SIGNIFICANT CHANGE UP (ref 3.3–5)
ALBUMIN SERPL ELPH-MCNC: 3.5 G/DL — SIGNIFICANT CHANGE UP (ref 3.3–5)
ALBUMIN SERPL ELPH-MCNC: 3.5 G/DL — SIGNIFICANT CHANGE UP (ref 3.3–5)
ALP SERPL-CCNC: 100 U/L — SIGNIFICANT CHANGE UP (ref 40–120)
ALP SERPL-CCNC: 100 U/L — SIGNIFICANT CHANGE UP (ref 40–120)
ALP SERPL-CCNC: 98 U/L — SIGNIFICANT CHANGE UP (ref 40–120)
ALT FLD-CCNC: 128 U/L RC — HIGH (ref 10–45)
ALT FLD-CCNC: 138 U/L RC — HIGH (ref 10–45)
ALT FLD-CCNC: 146 U/L RC — HIGH (ref 10–45)
ANION GAP SERPL CALC-SCNC: 17 MMOL/L — SIGNIFICANT CHANGE UP (ref 5–17)
ANION GAP SERPL CALC-SCNC: 18 MMOL/L — HIGH (ref 5–17)
ANION GAP SERPL CALC-SCNC: 20 MMOL/L — HIGH (ref 5–17)
APTT BLD: 34.6 SEC — SIGNIFICANT CHANGE UP (ref 27.5–37.4)
APTT BLD: 34.7 SEC — SIGNIFICANT CHANGE UP (ref 27.5–37.4)
AST SERPL-CCNC: 199 U/L — HIGH (ref 10–40)
AST SERPL-CCNC: 213 U/L — HIGH (ref 10–40)
AST SERPL-CCNC: 213 U/L — HIGH (ref 10–40)
BASE EXCESS BLDMV CALC-SCNC: -0.2 MMOL/L — SIGNIFICANT CHANGE UP (ref -3–3)
BASE EXCESS BLDMV CALC-SCNC: -1.9 MMOL/L — SIGNIFICANT CHANGE UP (ref -3–3)
BASE EXCESS BLDMV CALC-SCNC: 0.7 MMOL/L — SIGNIFICANT CHANGE UP (ref -3–3)
BASOPHILS # BLD AUTO: 0 K/UL — SIGNIFICANT CHANGE UP (ref 0–0.2)
BASOPHILS # BLD AUTO: 0 K/UL — SIGNIFICANT CHANGE UP (ref 0–0.2)
BASOPHILS NFR BLD AUTO: 0.6 % — SIGNIFICANT CHANGE UP (ref 0–2)
BASOPHILS NFR BLD AUTO: 0.8 % — SIGNIFICANT CHANGE UP (ref 0–2)
BILIRUB SERPL-MCNC: 2 MG/DL — HIGH (ref 0.2–1.2)
BILIRUB SERPL-MCNC: 2 MG/DL — HIGH (ref 0.2–1.2)
BILIRUB SERPL-MCNC: 2.1 MG/DL — HIGH (ref 0.2–1.2)
BUN SERPL-MCNC: 48 MG/DL — HIGH (ref 7–23)
BUN SERPL-MCNC: 49 MG/DL — HIGH (ref 7–23)
BUN SERPL-MCNC: 51 MG/DL — HIGH (ref 7–23)
CALCIUM SERPL-MCNC: 8.9 MG/DL — SIGNIFICANT CHANGE UP (ref 8.4–10.5)
CALCIUM SERPL-MCNC: 9 MG/DL — SIGNIFICANT CHANGE UP (ref 8.4–10.5)
CALCIUM SERPL-MCNC: 9.1 MG/DL — SIGNIFICANT CHANGE UP (ref 8.4–10.5)
CHLORIDE SERPL-SCNC: 92 MMOL/L — LOW (ref 96–108)
CHLORIDE SERPL-SCNC: 94 MMOL/L — LOW (ref 96–108)
CHLORIDE SERPL-SCNC: 95 MMOL/L — LOW (ref 96–108)
CO2 BLDMV-SCNC: 20 MMOL/L — LOW (ref 21–29)
CO2 BLDMV-SCNC: 25 MMOL/L — SIGNIFICANT CHANGE UP (ref 21–29)
CO2 BLDMV-SCNC: 25 MMOL/L — SIGNIFICANT CHANGE UP (ref 21–29)
CO2 SERPL-SCNC: 17 MMOL/L — LOW (ref 22–31)
CO2 SERPL-SCNC: 20 MMOL/L — LOW (ref 22–31)
CO2 SERPL-SCNC: 21 MMOL/L — LOW (ref 22–31)
CREAT SERPL-MCNC: 1.71 MG/DL — HIGH (ref 0.5–1.3)
CREAT SERPL-MCNC: 1.73 MG/DL — HIGH (ref 0.5–1.3)
CREAT SERPL-MCNC: 1.91 MG/DL — HIGH (ref 0.5–1.3)
EOSINOPHIL # BLD AUTO: 0 K/UL — SIGNIFICANT CHANGE UP (ref 0–0.5)
EOSINOPHIL # BLD AUTO: 0.1 K/UL — SIGNIFICANT CHANGE UP (ref 0–0.5)
EOSINOPHIL NFR BLD AUTO: 0.6 % — SIGNIFICANT CHANGE UP (ref 0–6)
EOSINOPHIL NFR BLD AUTO: 1 % — SIGNIFICANT CHANGE UP (ref 0–6)
FERRITIN SERPL-MCNC: 121 NG/ML — SIGNIFICANT CHANGE UP (ref 30–400)
GAS PNL BLDMV: SIGNIFICANT CHANGE UP
GLUCOSE SERPL-MCNC: 102 MG/DL — HIGH (ref 70–99)
GLUCOSE SERPL-MCNC: 79 MG/DL — SIGNIFICANT CHANGE UP (ref 70–99)
GLUCOSE SERPL-MCNC: 92 MG/DL — SIGNIFICANT CHANGE UP (ref 70–99)
HCO3 BLDMV-SCNC: 19 MMOL/L — LOW (ref 20–28)
HCO3 BLDMV-SCNC: 24 MMOL/L — SIGNIFICANT CHANGE UP (ref 20–28)
HCO3 BLDMV-SCNC: 24 MMOL/L — SIGNIFICANT CHANGE UP (ref 20–28)
HCT VFR BLD CALC: 24.6 % — LOW (ref 39–50)
HCT VFR BLD CALC: 30.2 % — LOW (ref 39–50)
HGB BLD-MCNC: 7.9 G/DL — LOW (ref 13–17)
HGB BLD-MCNC: 9.2 G/DL — LOW (ref 13–17)
HOROWITZ INDEX BLDMV+IHG-RTO: 21 — SIGNIFICANT CHANGE UP
INR BLD: 2.18 RATIO — HIGH (ref 0.88–1.16)
INR BLD: 2.41 RATIO — HIGH (ref 0.88–1.16)
LACTATE BLDV-MCNC: 1.8 MMOL/L — SIGNIFICANT CHANGE UP (ref 0.7–2)
LACTATE SERPL-SCNC: 1.7 MMOL/L — SIGNIFICANT CHANGE UP (ref 0.7–2)
LDH SERPL L TO P-CCNC: 353 U/L — HIGH (ref 50–242)
LYMPHOCYTES # BLD AUTO: 1.1 K/UL — SIGNIFICANT CHANGE UP (ref 1–3.3)
LYMPHOCYTES # BLD AUTO: 1.5 K/UL — SIGNIFICANT CHANGE UP (ref 1–3.3)
LYMPHOCYTES # BLD AUTO: 22.5 % — SIGNIFICANT CHANGE UP (ref 13–44)
LYMPHOCYTES # BLD AUTO: 28.8 % — SIGNIFICANT CHANGE UP (ref 13–44)
MAGNESIUM SERPL-MCNC: 2.6 MG/DL — SIGNIFICANT CHANGE UP (ref 1.6–2.6)
MAGNESIUM SERPL-MCNC: 2.6 MG/DL — SIGNIFICANT CHANGE UP (ref 1.6–2.6)
MAGNESIUM SERPL-MCNC: 2.7 MG/DL — HIGH (ref 1.6–2.6)
MCHC RBC-ENTMCNC: 26.9 PG — LOW (ref 27–34)
MCHC RBC-ENTMCNC: 27.8 PG — SIGNIFICANT CHANGE UP (ref 27–34)
MCHC RBC-ENTMCNC: 30.5 GM/DL — LOW (ref 32–36)
MCHC RBC-ENTMCNC: 32.1 GM/DL — SIGNIFICANT CHANGE UP (ref 32–36)
MCV RBC AUTO: 86.4 FL — SIGNIFICANT CHANGE UP (ref 80–100)
MCV RBC AUTO: 88.3 FL — SIGNIFICANT CHANGE UP (ref 80–100)
MONOCYTES # BLD AUTO: 0.6 K/UL — SIGNIFICANT CHANGE UP (ref 0–0.9)
MONOCYTES # BLD AUTO: 0.7 K/UL — SIGNIFICANT CHANGE UP (ref 0–0.9)
MONOCYTES NFR BLD AUTO: 13.1 % — SIGNIFICANT CHANGE UP (ref 2–14)
MONOCYTES NFR BLD AUTO: 13.1 % — SIGNIFICANT CHANGE UP (ref 2–14)
NEUTROPHILS # BLD AUTO: 3 K/UL — SIGNIFICANT CHANGE UP (ref 1.8–7.4)
NEUTROPHILS # BLD AUTO: 3 K/UL — SIGNIFICANT CHANGE UP (ref 1.8–7.4)
NEUTROPHILS NFR BLD AUTO: 56.3 % — SIGNIFICANT CHANGE UP (ref 43–77)
NEUTROPHILS NFR BLD AUTO: 63.1 % — SIGNIFICANT CHANGE UP (ref 43–77)
O2 CT VFR BLD CALC: 24 MMHG — LOW (ref 30–65)
O2 CT VFR BLD CALC: 26 MMHG — LOW (ref 30–65)
O2 CT VFR BLD CALC: 52 MMHG — SIGNIFICANT CHANGE UP (ref 30–65)
PCO2 BLDMV: 21 MMHG — LOW (ref 30–65)
PCO2 BLDMV: 37 MMHG — SIGNIFICANT CHANGE UP (ref 30–65)
PCO2 BLDMV: 38 MMHG — SIGNIFICANT CHANGE UP (ref 30–65)
PH BLDMV: 7.41 — SIGNIFICANT CHANGE UP (ref 7.32–7.45)
PH BLDMV: 7.44 — SIGNIFICANT CHANGE UP (ref 7.32–7.45)
PH BLDMV: 7.56 — HIGH (ref 7.32–7.45)
PHOSPHATE SERPL-MCNC: 4 MG/DL — SIGNIFICANT CHANGE UP (ref 2.5–4.5)
PHOSPHATE SERPL-MCNC: 4.2 MG/DL — SIGNIFICANT CHANGE UP (ref 2.5–4.5)
PHOSPHATE SERPL-MCNC: 4.2 MG/DL — SIGNIFICANT CHANGE UP (ref 2.5–4.5)
PLATELET # BLD AUTO: 369 K/UL — SIGNIFICANT CHANGE UP (ref 150–400)
PLATELET # BLD AUTO: 377 K/UL — SIGNIFICANT CHANGE UP (ref 150–400)
POTASSIUM SERPL-MCNC: 4.1 MMOL/L — SIGNIFICANT CHANGE UP (ref 3.5–5.3)
POTASSIUM SERPL-SCNC: 4.1 MMOL/L — SIGNIFICANT CHANGE UP (ref 3.5–5.3)
PROT SERPL-MCNC: 6.7 G/DL — SIGNIFICANT CHANGE UP (ref 6–8.3)
PROT SERPL-MCNC: 6.9 G/DL — SIGNIFICANT CHANGE UP (ref 6–8.3)
PROT SERPL-MCNC: 7.1 G/DL — SIGNIFICANT CHANGE UP (ref 6–8.3)
PROTHROM AB SERPL-ACNC: 23.9 SEC — HIGH (ref 9.8–12.7)
PROTHROM AB SERPL-ACNC: 26.8 SEC — HIGH (ref 9.8–12.7)
RBC # BLD: 2.84 M/UL — LOW (ref 4.2–5.8)
RBC # BLD: 3.42 M/UL — LOW (ref 4.2–5.8)
RBC # FLD: 19.8 % — HIGH (ref 10.3–14.5)
RBC # FLD: 19.9 % — HIGH (ref 10.3–14.5)
SAO2 % BLDMV: 32 % — LOW (ref 60–90)
SAO2 % BLDMV: 36 % — LOW (ref 60–90)
SAO2 % BLDMV: 88 % — SIGNIFICANT CHANGE UP (ref 60–90)
SODIUM SERPL-SCNC: 131 MMOL/L — LOW (ref 135–145)
SODIUM SERPL-SCNC: 131 MMOL/L — LOW (ref 135–145)
SODIUM SERPL-SCNC: 132 MMOL/L — LOW (ref 135–145)
WBC # BLD: 4.8 K/UL — SIGNIFICANT CHANGE UP (ref 3.8–10.5)
WBC # BLD: 5.3 K/UL — SIGNIFICANT CHANGE UP (ref 3.8–10.5)
WBC # FLD AUTO: 4.8 K/UL — SIGNIFICANT CHANGE UP (ref 3.8–10.5)
WBC # FLD AUTO: 5.3 K/UL — SIGNIFICANT CHANGE UP (ref 3.8–10.5)

## 2017-06-09 PROCEDURE — 99233 SBSQ HOSP IP/OBS HIGH 50: CPT

## 2017-06-09 PROCEDURE — 93010 ELECTROCARDIOGRAM REPORT: CPT

## 2017-06-09 PROCEDURE — 71010: CPT | Mod: 26

## 2017-06-09 PROCEDURE — 93451 RIGHT HEART CATH: CPT | Mod: 26,GC

## 2017-06-09 PROCEDURE — 99233 SBSQ HOSP IP/OBS HIGH 50: CPT | Mod: GC

## 2017-06-09 RX ORDER — METOPROLOL TARTRATE 50 MG
25 TABLET ORAL DAILY
Qty: 0 | Refills: 0 | Status: DISCONTINUED | OUTPATIENT
Start: 2017-06-09 | End: 2017-06-10

## 2017-06-09 RX ADMIN — Medication 1 TABLET(S): at 11:58

## 2017-06-09 RX ADMIN — MEXILETINE HYDROCHLORIDE 150 MILLIGRAM(S): 150 CAPSULE ORAL at 05:31

## 2017-06-09 RX ADMIN — Medication 500 MILLIGRAM(S): at 11:58

## 2017-06-09 RX ADMIN — MEXILETINE HYDROCHLORIDE 150 MILLIGRAM(S): 150 CAPSULE ORAL at 21:11

## 2017-06-09 RX ADMIN — Medication 1 MILLIGRAM(S): at 11:58

## 2017-06-09 RX ADMIN — Medication 10 MG/HR: at 21:12

## 2017-06-09 RX ADMIN — MEXILETINE HYDROCHLORIDE 150 MILLIGRAM(S): 150 CAPSULE ORAL at 14:08

## 2017-06-09 RX ADMIN — Medication 5 MILLIGRAM(S): at 11:58

## 2017-06-09 RX ADMIN — Medication 100 MILLIGRAM(S): at 21:11

## 2017-06-09 RX ADMIN — Medication 100 MILLIGRAM(S): at 14:07

## 2017-06-09 RX ADMIN — SENNA PLUS 2 TABLET(S): 8.6 TABLET ORAL at 21:12

## 2017-06-09 RX ADMIN — MILRINONE LACTATE 13.5 MICROGRAM(S)/KG/MIN: 1 INJECTION, SOLUTION INTRAVENOUS at 21:12

## 2017-06-09 NOTE — PROGRESS NOTE ADULT - ASSESSMENT
68y/o M with HFrEF 15-20% on a stable dose of milrinone at 3mcg/kg/min via PICC s/p AICD, Paroxymal AFib on xarelto, found to have a abnormal PT/PTT.

## 2017-06-09 NOTE — PROGRESS NOTE ADULT - ATTENDING COMMENTS
NORMAN- cardiorenal on Lasix, now euvolumic- decreasing Lasix drip, creatinine improving    mild urinary retention yesterday with improvement in urine output today check-Bladder scan today for pvr    Hypervolumia- lasix drip; monitor Is/Os    Met acidosis- monitor Hco3 trend daily. NORMAN- cardiorenal on Lasix drip, now euvolumic- decrease Lasix drip per CCU, creatinine improving/stable    mild urinary retention-yesterday ?postvoid vs random bladder sono with 200s Urine in bladder;  check-Bladder scan today for pvr    Hypervolumia/CHF- lasix drip; monitor Is/Os, LVAD eval    Met acidosis- monitor Hco3 trend daily.

## 2017-06-09 NOTE — PROGRESS NOTE ADULT - PROBLEM SELECTOR PLAN 2
Improved today.   Likely cardiorenal in setting of ADHF +/- postobstructive etiology. Bladder scans with 200cc residual of urine.   Continue Milrinone and Lasix gtt.  Bladders Scans.  Avoid nephrotoxins.  Strict I/Os.    Follow up Nephrology recommendations. Improved today.   Likely cardiorenal in setting of ADHF +/- postobstructive etiology. Bladder scans 6/8 with 200cc residual of urine.   Continue Milrinone and Lasix gtt.  Continue daily Bladders Scans.  Avoid nephrotoxins.  Strict I/Os.    Follow up Nephrology recommendations.

## 2017-06-09 NOTE — PROGRESS NOTE ADULT - PROBLEM SELECTOR PLAN 3
INR less than 2.5 today.   Mixing Studies consistent with likely deficiency.   Heme recommendations appreciated: Continue Vitamin K 5mg PO QD trial.  Follow up daily  Heme recommendations.

## 2017-06-09 NOTE — PROGRESS NOTE ADULT - PROBLEM SELECTOR PLAN 5
Mild elevation today. Denies abdominal pain this AM.   Likely in setting of hepatic congestion due to ADHF. Hepatitis panel negative.  Abdominal U/S showed no evidence of acute liver etiology. Negative for cirrhosis.    Trend LFTS. Still uptrending.   Likely in setting of hepatic congestion due to ADHF. Hepatitis panel negative.  Abdominal U/S showed no evidence of acute liver etiology. Negative for cirrhosis.    Trend LFTS.

## 2017-06-09 NOTE — PROGRESS NOTE ADULT - SUBJECTIVE AND OBJECTIVE BOX
Events 	  No acute events overnight. Plan for right heart cath today.    MEDICATIONS  (STANDING):  mexiletine 150milliGRAM(s) Oral three times a day  docusate sodium 100milliGRAM(s) Oral three times a day  senna 2Tablet(s) Oral at bedtime  multivitamin/minerals 1Tablet(s) Oral daily  calcium carbonate  625 mG + Vitamin D (OsCal 250 + D) 1Tablet(s) Oral daily  folic acid 1milliGRAM(s) Oral daily  ascorbic acid 500milliGRAM(s) Oral daily  metoprolol succinate ER 25milliGRAM(s) Oral daily  furosemide Infusion 15mG/Hr IV Continuous <Continuous>  milrinone Infusion 0.5MICROgram(s)/kG/Min IV Continuous <Continuous>  phytonadione   Solution 5milliGRAM(s) Oral daily    MEDICATIONS  (PRN):  bisacodyl 5milliGRAM(s) Oral every 12 hours PRN Constipation  aluminum hydroxide/magnesium hydroxide/simethicone Suspension 30milliLiter(s) Oral every 6 hours PRN Dyspepsia      REVIEW OF SYSTEMS:  Otherwise, 10 point ROS done and otherwise negative.      Vital Signs Last 24 Hrs  T(C): 36.7, Max: 36.8 (06-08 @ 23:00)  T(F): 98, Max: 98.2 (06-08 @ 23:00)  HR: 100 (94 - 104)  BP: 86/63 (81/69 - 99/78)  BP(mean): 74 (70 - 88)  RR: 22 (12 - 24)  SpO2: 94% (84% - 97%)  I&O's Summary    I & Os for current day (as of 09 Jun 2017 07:12)  =============================================  IN: 1123.4 ml / OUT: 1750 ml / NET: -626.6 ml    CAPILLARY BLOOD GLUCOSE      PHYSICAL EXAM:  Appearance: Normal	  HEENT:   Normal oral mucosa, PERRL, EOMI	  Lymphatic: No lymphadenopathy  Cardiovascular: Normal S1 S2, No JVD, No murmurs, No edema  Respiratory: Lungs clear to auscultation	  Psychiatry: A & O x 3, Mood & affect appropriate  Gastrointestinal:  Soft, Non-tender, + BS	  Skin: No rashes, No ecchymoses, No cyanosis	  Neurologic: Non-focal  Extremities: Normal range of motion, No clubbing, cyanosis or edema  Vascular: Peripheral pulses palpable 2+ bilaterally    LABS:                        9.2    5.3   )-----------( 377      ( 09 Jun 2017 06:26 )             30.2                         8.9    5.7   )-----------( 361      ( 08 Jun 2017 03:10 )             29.9     06-09    131<L>  |  92<L>  |  51<H>  ----------------------------<  79  4.1   |  21<L>  |  1.91<H>  06-08    130<L>  |  92<L>  |  53<H>  ----------------------------<  94  4.3   |  20<L>  |  1.93<H>    Ca    9.0      09 Jun 2017 06:26  Ca    8.8      08 Jun 2017 17:33  Phos  4.2     06-09  Mg     2.6     06-09    TPro  6.7  /  Alb  3.5  /  TBili  2.1<H>  /  DBili  x   /  AST  199<H>  /  ALT  128<H>  /  AlkPhos  98  06-09  TPro  7.1  /  Alb  3.6  /  TBili  1.9<H>  /  DBili  x   /  AST  184<H>  /  ALT  113<H>  /  AlkPhos  102  06-08  	 	    PT/INR - ( 09 Jun 2017 06:26 )   PT: 26.8 sec;   INR: 2.41 ratio         PTT - ( 09 Jun 2017 06:26 )  PTT:34.6 sec    TELEMETRY: 	    ECG:  	    PREVIOUS DIAGNOSTIC TESTING:    [ ] Echocardiogram: Patient is a 67y old  Male who presents with a chief complaint of Transfer from [facility] for management of ADHF/ LVAD eval (03 Jun 2017 00:32)    Events 	  No acute events overnight. Plan for right heart cath today.    MEDICATIONS  (STANDING):  mexiletine 150milliGRAM(s) Oral three times a day  docusate sodium 100milliGRAM(s) Oral three times a day  senna 2Tablet(s) Oral at bedtime  multivitamin/minerals 1Tablet(s) Oral daily  calcium carbonate  625 mG + Vitamin D (OsCal 250 + D) 1Tablet(s) Oral daily  folic acid 1milliGRAM(s) Oral daily  ascorbic acid 500milliGRAM(s) Oral daily  metoprolol succinate ER 25milliGRAM(s) Oral daily  furosemide Infusion 15mG/Hr IV Continuous <Continuous>  milrinone Infusion 0.5MICROgram(s)/kG/Min IV Continuous <Continuous>  phytonadione   Solution 5milliGRAM(s) Oral daily    MEDICATIONS  (PRN):  bisacodyl 5milliGRAM(s) Oral every 12 hours PRN Constipation  aluminum hydroxide/magnesium hydroxide/simethicone Suspension 30milliLiter(s) Oral every 6 hours PRN Dyspepsia      REVIEW OF SYSTEMS:  Otherwise, 10 point ROS done and otherwise negative.      Vital Signs Last 24 Hrs  T(C): 36.7, Max: 36.8 (06-08 @ 23:00)  T(F): 98, Max: 98.2 (06-08 @ 23:00)  HR: 100 (94 - 104)  BP: 86/63 (81/69 - 99/78)  BP(mean): 74 (70 - 88)  RR: 22 (12 - 24)  SpO2: 94% (84% - 97%)  I&O's Summary    I & Os for current day (as of 09 Jun 2017 07:12)  =============================================  IN: 1123.4 ml / OUT: 1750 ml / NET: -626.6 ml    CAPILLARY BLOOD GLUCOSE      PHYSICAL EXAM:  Appearance: Normal	  HEENT:   Normal oral mucosa, PERRL, EOMI	  Lymphatic: No lymphadenopathy  Cardiovascular: Normal S1 S2, No JVD, No murmurs, No edema  Respiratory: Lungs clear to auscultation	  Psychiatry: A & O x 3, Mood & affect appropriate  Gastrointestinal:  Soft, Non-tender, + BS	  Skin: No rashes, No ecchymoses, No cyanosis	  Neurologic: Non-focal  Extremities: Normal range of motion, No clubbing, cyanosis or edema  Vascular: Peripheral pulses palpable 2+ bilaterally    LABS:                        9.2    5.3   )-----------( 377      ( 09 Jun 2017 06:26 )             30.2                         8.9    5.7   )-----------( 361      ( 08 Jun 2017 03:10 )             29.9     06-09    131<L>  |  92<L>  |  51<H>  ----------------------------<  79  4.1   |  21<L>  |  1.91<H>  06-08    130<L>  |  92<L>  |  53<H>  ----------------------------<  94  4.3   |  20<L>  |  1.93<H>    Ca    9.0      09 Jun 2017 06:26  Ca    8.8      08 Jun 2017 17:33  Phos  4.2     06-09  Mg     2.6     06-09    TPro  6.7  /  Alb  3.5  /  TBili  2.1<H>  /  DBili  x   /  AST  199<H>  /  ALT  128<H>  /  AlkPhos  98  06-09  TPro  7.1  /  Alb  3.6  /  TBili  1.9<H>  /  DBili  x   /  AST  184<H>  /  ALT  113<H>  /  AlkPhos  102  06-08  	 	    PT/INR - ( 09 Jun 2017 06:26 )   PT: 26.8 sec;   INR: 2.41 ratio         PTT - ( 09 Jun 2017 06:26 )  PTT:34.6 sec    TELEMETRY: 	    ECG:  	    PREVIOUS DIAGNOSTIC TESTING:    [ ] Echocardiogram:

## 2017-06-09 NOTE — PROGRESS NOTE ADULT - PROBLEM SELECTOR PLAN 1
-Partially corrected PT on mixing studies.   -Holding xarelto.  -Can start Vitamin K 5mg PO daily, trend coags.. -Partially corrected PT on mixing studies. check Factor VII assay, Lupus antivoagulant    -Holding xarelto.  -Can start Vitamin K 5mg PO daily, trend coags..

## 2017-06-09 NOTE — PROGRESS NOTE ADULT - PROBLEM SELECTOR PLAN 4
Rate control. Continue with Toprol XL 25mg QD.   Xarelto held in setting of supra therapeutic INR. Plan for RHC today. Rate control. Continue with Toprol XL 25mg QD.   Continue to hold Xarelto.  Plan for RHC today.

## 2017-06-09 NOTE — PROGRESS NOTE ADULT - PROBLEM SELECTOR PLAN 3
in setting of heart failure/ renal failure. Advise to taper lasix drip. Monitor weights. in setting of heart failure/ renal failure. Advise to taper lasix drip. Monitor weights. LVAD eval

## 2017-06-09 NOTE — PROGRESS NOTE ADULT - ATTENDING COMMENTS
Patient is seen and examined with housestaff and fellow. Agree with assessment/plan.  INR < 2.5 - eligible for RHC today to assess filling pressures and CO/CI  - c/w ionotropic assistance for diuresis  - c/w Lasix gtt

## 2017-06-09 NOTE — CONSULT NOTE ADULT - SUBJECTIVE AND OBJECTIVE BOX
Patient is a 67y old  Male who presents with a chief complaint of Transfer from [facility] for management of ADHF/ LVAD eval (03 Jun 2017 00:32)      HPI:  66y/o M with advanced NICM BiV HFrEF 15-20% on a stable dose of milrinone at 3mcg/kg/min via PICC s/p AICD, with replacement from [employer] to [employer] (2009), PAF on AC and h/o VT, with recent admission for AICD firing, now presenting with dizziness and nausea/vomiting X 1 Day. He says that he was doing well earlier in the day and felt like his usual self. Normally he only walks from his living room to the front steps and this afternoon he decided to walk around a city block, which is significantly longer than he normally walks for. A while after that he developed nausea and vomited NBNB 3x and felt dizzy. ROS otherwise negative for f/c/ns/cp/abd p/diarrhea/consitpation, no syncope; no changes in medication, taking as prescribed. He says that his normal BP is around 90 systolic.    Patient initially presented to [facility], seen by cardiology. Labs noteable for pro bnp 3424, Trop T 0.08, CK 50, hyponatremia to 133 (from 135 two weeks prior), INR elevated at 4.36, Cr 2.07 from BL 0.9-1, BR 1.8. EKG showing NSR with first degree block. (03 Jun 2017 00:32)      PAST MEDICAL & SURGICAL HISTORY:  H/O prior ablation treatment: for Afib  Ventricular fibrillation: s/p AICD  PAF (paroxysmal atrial fibrillation): on xarelto (stopped 6/03)   Non-Ischemic Cardiomyopathy  SVT (Supraventricular Tachycardia)  HTN  CHF (Congestive Heart Failure)  Status post left hip replacement  Hypertension  Hypertension  History of Prior Ablation Treatment: for afib  AICD (Automatic Cardioverter/Defibrillator) Present: [employer] with 1 [employer] lead4/1/09- explanted and replaced with [employer] 2 leads on 9/2/09    (  -) heart valve replacement  ( -  ) joint replacement  ( - ) pregnancy    MEDICATIONS  (STANDING):  mexiletine 150milliGRAM(s) Oral three times a day  docusate sodium 100milliGRAM(s) Oral three times a day  senna 2Tablet(s) Oral at bedtime  multivitamin/minerals 1Tablet(s) Oral daily  calcium carbonate  625 mG + Vitamin D (OsCal 250 + D) 1Tablet(s) Oral daily  folic acid 1milliGRAM(s) Oral daily  ascorbic acid 500milliGRAM(s) Oral daily  metoprolol succinate ER 25milliGRAM(s) Oral daily  furosemide Infusion 15mG/Hr IV Continuous <Continuous>  milrinone Infusion 0.5MICROgram(s)/kG/Min IV Continuous <Continuous>  phytonadione   Solution 5milliGRAM(s) Oral daily    MEDICATIONS  (PRN):  bisacodyl 5milliGRAM(s) Oral every 12 hours PRN Constipation  aluminum hydroxide/magnesium hydroxide/simethicone Suspension 30milliLiter(s) Oral every 6 hours PRN Dyspepsia      Allergies    No Known Allergies    Intolerances        FAMILY HISTORY:  No pertinent family history in first degree relatives      *SOCIAL HISTORY: (), (smoking hx-none)    *Last Dental Visit: 10 years ago     Vital Signs Last 24 Hrs  T(C): 36.6, Max: 36.8 (06-08 @ 23:00)  T(F): 97.9, Max: 98.2 (06-08 @ 23:00)  HR: 102 (94 - 102)  BP: 90/74 (81/69 - 97/72)  BP(mean): 79 (62 - 88)  RR: 19 (11 - 22)  SpO2: 93% (84% - 98%)    EOE:  TMJ ( -  ) clicks                    (  -  ) pops                    (  -  ) crepitus             Mandible <<FROM>>             Facial bones and MOM <<grossly intact>>             ( -  ) trismus             ( -  ) LAD             ( -  ) swelling             ( -  ) asymmetry             ( -  ) palpation             (  - ) SOB             ( -  ) dysphagia             (  - ) LOC    IOE:  <<permanent>> dentition: caries 22,27 <<multiple missing teeth>>    Patient wears maxillary complete denture and mandibular partial denture. Dentures are ten years old.              hard/soft palate:  (  - ) palatal torus           tongue/FOM <<WNL>>           labial/buccal mucosa <<WNL>>           ( -  ) percussion           ( -  ) palpation           ( -  ) swelling     Dentition present: <<22,27   >> No evidence of acute abscess or swelling. Tissue appears healthy.   Mobility: <<grade I  >>  Caries: << Facial caries wrapping to distal noted on #22, 27, clincal appearance is not deep >>     Radiographs: Patient not optimized for transport at this time and did not want to go unless he had too    LABS:                        9.2    5.3   )-----------( 377      ( 09 Jun 2017 06:26 )             30.2     06-09    131<L>  |  92<L>  |  51<H>  ----------------------------<  79  4.1   |  21<L>  |  1.91<H>    Ca    9.0      09 Jun 2017 06:26  Phos  4.2     06-09  Mg     2.6     06-09    TPro  6.7  /  Alb  3.5  /  TBili  2.1<H>  /  DBili  x   /  AST  199<H>  /  ALT  128<H>  /  AlkPhos  98  06-09    WBC Count: 5.3 K/uL [3.8 - 10.5] (06-09 @ 06:26)    Platelet Count - Automated: 377 K/uL [150 - 400] (06-09 @ 06:26)  Platelet Count - Automated: 361 K/uL [150 - 400] (06-08 @ 03:10)    INR: 2.41 ratio <H> [0.88 - 1.16] (06-09 @ 06:26)  INR: 2.75 ratio <H> [0.88 - 1.16] (06-08 @ 14:33)  INR: 3.46 ratio <H> [0.88 - 1.16] (06-08 @ 03:10)            RADIOLOGY & ADDITIONAL STUDIES:    ASSESSMENT: No evidence of acute dental infection based on clinical examination. Caries noted on #22,27. Mobility is slight.    PROCEDURE:  EOE, IOE     RECOMMENDATIONS:   1)   2) Dental F/U with outpatient dentist for comprehensive dental care.   3) If any difficulty swallowing/breathing, fever occur, page dental.     Resident Name, pager #  Oral surgeon consulted: Patient is a 67y old  Male who presents with a chief complaint of Transfer from [facility] for management of ADHF/ LVAD eval (03 Jun 2017 00:32)      HPI:  68y/o M with advanced NICM BiV HFrEF 15-20% on a stable dose of milrinone at 3mcg/kg/min via PICC s/p AICD, with replacement from [employer] to [employer] (2009), PAF on AC and h/o VT, with recent admission for AICD firing, now presenting with dizziness and nausea/vomiting X 1 Day. He says that he was doing well earlier in the day and felt like his usual self. Normally he only walks from his living room to the front steps and this afternoon he decided to walk around a city block, which is significantly longer than he normally walks for. A while after that he developed nausea and vomited NBNB 3x and felt dizzy. ROS otherwise negative for f/c/ns/cp/abd p/diarrhea/consitpation, no syncope; no changes in medication, taking as prescribed. He says that his normal BP is around 90 systolic.    Patient initially presented to [facility], seen by cardiology. Labs noteable for pro bnp 3424, Trop T 0.08, CK 50, hyponatremia to 133 (from 135 two weeks prior), INR elevated at 4.36, Cr 2.07 from BL 0.9-1, BR 1.8. EKG showing NSR with first degree block. (03 Jun 2017 00:32)      PAST MEDICAL & SURGICAL HISTORY:  H/O prior ablation treatment: for Afib  Ventricular fibrillation: s/p AICD  PAF (paroxysmal atrial fibrillation): on xarelto (stopped 6/03)   Non-Ischemic Cardiomyopathy  SVT (Supraventricular Tachycardia)  HTN  CHF (Congestive Heart Failure)  Status post left hip replacement  Hypertension  Hypertension  History of Prior Ablation Treatment: for afib  AICD (Automatic Cardioverter/Defibrillator) Present: [employer] with 1 [employer] lead4/1/09- explanted and replaced with [employer] 2 leads on 9/2/09    (  -) heart valve replacement  ( -  ) joint replacement  ( - ) pregnancy    MEDICATIONS  (STANDING):  mexiletine 150milliGRAM(s) Oral three times a day  docusate sodium 100milliGRAM(s) Oral three times a day  senna 2Tablet(s) Oral at bedtime  multivitamin/minerals 1Tablet(s) Oral daily  calcium carbonate  625 mG + Vitamin D (OsCal 250 + D) 1Tablet(s) Oral daily  folic acid 1milliGRAM(s) Oral daily  ascorbic acid 500milliGRAM(s) Oral daily  metoprolol succinate ER 25milliGRAM(s) Oral daily  furosemide Infusion 15mG/Hr IV Continuous <Continuous>  milrinone Infusion 0.5MICROgram(s)/kG/Min IV Continuous <Continuous>  phytonadione   Solution 5milliGRAM(s) Oral daily    MEDICATIONS  (PRN):  bisacodyl 5milliGRAM(s) Oral every 12 hours PRN Constipation  aluminum hydroxide/magnesium hydroxide/simethicone Suspension 30milliLiter(s) Oral every 6 hours PRN Dyspepsia      Allergies    No Known Allergies    Intolerances        FAMILY HISTORY:  No pertinent family history in first degree relatives      *SOCIAL HISTORY: (smoking hx-none)    *Last Dental Visit: 10 years ago     Vital Signs Last 24 Hrs  T(C): 36.6, Max: 36.8 (06-08 @ 23:00)  T(F): 97.9, Max: 98.2 (06-08 @ 23:00)  HR: 102 (94 - 102)  BP: 90/74 (81/69 - 97/72)  BP(mean): 79 (62 - 88)  RR: 19 (11 - 22)  SpO2: 93% (84% - 98%)    EOE:  TMJ ( -  ) clicks                    (  -  ) pops                    (  -  ) crepitus             Mandible <<FROM>>             Facial bones and MOM <<grossly intact>>             ( -  ) trismus             ( -  ) LAD             ( -  ) swelling             ( -  ) asymmetry             ( -  ) palpation             (  - ) SOB             ( -  ) dysphagia             (  - ) LOC    IOE:  <<permanent>> dentition: caries 22,27 <<multiple missing teeth>>    Patient wears maxillary complete denture and mandibular partial denture. Dentures are ten years old.              hard/soft palate:  (  - ) palatal torus           tongue/FOM <<WNL>>           labial/buccal mucosa <<WNL>>           ( -  ) percussion           ( -  ) palpation           ( -  ) swelling     Dentition present: <<22,27   >> No evidence of acute abscess or swelling. Tissue appears healthy.   Mobility: <<grade I  >>  Caries: << Facial caries wrapping to distal noted on #22, 27, clincal appearance is not deep >>     Radiographs: Patient was optimized for transport and was brought to the Dental ED room for radiographs on telemetry with one nurse and one cardiac fellow. PA of #22 and #27 collected. Both teeth have evidence of advanced adult periodontal disease as well as caries on the distal. Tooth #27 has caries approximating the pulp with indication of periapical pathology.     LABS:                        9.2    5.3   )-----------( 377      ( 09 Jun 2017 06:26 )             30.2     06-09    131<L>  |  92<L>  |  51<H>  ----------------------------<  79  4.1   |  21<L>  |  1.91<H>    Ca    9.0      09 Jun 2017 06:26  Phos  4.2     06-09  Mg     2.6     06-09    TPro  6.7  /  Alb  3.5  /  TBili  2.1<H>  /  DBili  x   /  AST  199<H>  /  ALT  128<H>  /  AlkPhos  98  06-09    WBC Count: 5.3 K/uL [3.8 - 10.5] (06-09 @ 06:26)    Platelet Count - Automated: 377 K/uL [150 - 400] (06-09 @ 06:26)  Platelet Count - Automated: 361 K/uL [150 - 400] (06-08 @ 03:10)    INR: 2.41 ratio <H> [0.88 - 1.16] (06-09 @ 06:26)  INR: 2.75 ratio <H> [0.88 - 1.16] (06-08 @ 14:33)  INR: 3.46 ratio <H> [0.88 - 1.16] (06-08 @ 03:10)            RADIOLOGY & ADDITIONAL STUDIES:    ASSESSMENT: No evidence of acute dental infection based on clinical examination. Chronic dental infection noted on #27. Caries noted on #22,27. Mobility is slight.    PROCEDURE:  EOE, IOE , radiographs     RECOMMENDATIONS:   1) No indication for dental extraction prior to LVAD surgery. No acute abscess, swelling, or labs to indicate infection.   2) Dental F/U with outpatient dentist for comprehensive dental care.   3) Page dental with any concerns     Fredy Rea DDS    Oral surgeon consulted: Dr. Anita Gutierrez [phone]

## 2017-06-09 NOTE — PROGRESS NOTE ADULT - PROBLEM SELECTOR PLAN 2
Normocytic anemia.   Iron panel consistent with anemia of chronic disease. Send Ferritin to rule out iron deficiency anemia. Normocytic anemia.   Send Ferritin to rule out iron deficiency anemia, low iron saturation. If ferritin is low. Can start Iron tablets BID.

## 2017-06-09 NOTE — PROGRESS NOTE ADULT - SUBJECTIVE AND OBJECTIVE BOX
Hematology Follow-up    INTERVAL HPI/OVERNIGHT EVENTS:  Patient S&E at bedside. No o/n events, patient resting comfortably. No complaints at this time. Patient denies fever, chills, dizziness, weakness, CP, palpitations, SOB, cough, N/V/D/C, dysuria, changes in bowel movements, LE edema.    VITAL SIGNS:  T(F): 97.9  HR: 102  BP: 97/72  RR: 15  SpO2: 98%  Wt(kg): --    PHYSICAL EXAM:    Constitutional: AAOx3, NAD,   Eyes: PERRL, EOMI, sclera non-icteric  Neck: supple, no masses, no JVD  Respiratory: CTA b/l, good air entry b/l, no wheezing, rhonchi, rales, with normal respiratory effort and no intercostal retractions  Cardiovascular: RRR, normal S1S2, no M/R/G  Gastrointestinal: soft, NTND, no masses palpable, BS normal in all four quadrants, no HSM  Extremities:  no c/c/e  Neurological: Grossly intact  Skin: Normal temperature    MEDICATIONS  (STANDING):  mexiletine 150milliGRAM(s) Oral three times a day  docusate sodium 100milliGRAM(s) Oral three times a day  senna 2Tablet(s) Oral at bedtime  multivitamin/minerals 1Tablet(s) Oral daily  calcium carbonate  625 mG + Vitamin D (OsCal 250 + D) 1Tablet(s) Oral daily  folic acid 1milliGRAM(s) Oral daily  ascorbic acid 500milliGRAM(s) Oral daily  metoprolol succinate ER 25milliGRAM(s) Oral daily  furosemide Infusion 15mG/Hr IV Continuous <Continuous>  milrinone Infusion 0.5MICROgram(s)/kG/Min IV Continuous <Continuous>  phytonadione   Solution 5milliGRAM(s) Oral daily    MEDICATIONS  (PRN):  bisacodyl 5milliGRAM(s) Oral every 12 hours PRN Constipation  aluminum hydroxide/magnesium hydroxide/simethicone Suspension 30milliLiter(s) Oral every 6 hours PRN Dyspepsia      No Known Allergies      LABS:                        9.2    5.3   )-----------( 377      ( 09 Jun 2017 06:26 )             30.2     06-09    131<L>  |  92<L>  |  51<H>  ----------------------------<  79  4.1   |  21<L>  |  1.91<H>    Ca    9.0      09 Jun 2017 06:26  Phos  4.2     06-09  Mg     2.6     06-09    TPro  6.7  /  Alb  3.5  /  TBili  2.1<H>  /  DBili  x   /  AST  199<H>  /  ALT  128<H>  /  AlkPhos  98  06-09    PT/INR - ( 09 Jun 2017 06:26 )   PT: 26.8 sec;   INR: 2.41 ratio         PTT - ( 09 Jun 2017 06:26 )  PTT:34.6 sec Lactate Dehydrogenase, Serum: 353 U/L (06-09 @ 06:26)        RADIOLOGY & ADDITIONAL TESTS:  Studies reviewed. Hematology Follow-up    INTERVAL HPI/OVERNIGHT EVENTS:  Patient S&E at bedside. No o/n events, patient resting comfortably. No complaints at this time. Patient denies fever, chills, dizziness, weakness, CP, palpitations, SOB, cough, N/V/D/C, dysuria, changes in bowel movements, LE edema.    VITAL SIGNS:  T(F): 97.9  HR: 102  BP: 97/72  RR: 15  SpO2: 98%  Wt(kg): --    PHYSICAL EXAM:    Constitutional: AAOx3, NAD,   Eyes: PERRL, EOMI, sclera non-icteric  Neck: supple, no masses, no JVD  Respiratory: CTA b/l, good air entry b/l, no wheezing, rhonchi, rales, with normal respiratory effort and no intercostal retractions  Cardiovascular: irregularly irregular, no  murmur.  Gastrointestinal: soft, NTND, no masses palpable, BS normal in all four quadrants, no HSM  Extremities:  no c/c/e  Neurological: Grossly intact  Skin: Normal temperature    MEDICATIONS  (STANDING):  mexiletine 150milliGRAM(s) Oral three times a day  docusate sodium 100milliGRAM(s) Oral three times a day  senna 2Tablet(s) Oral at bedtime  multivitamin/minerals 1Tablet(s) Oral daily  calcium carbonate  625 mG + Vitamin D (OsCal 250 + D) 1Tablet(s) Oral daily  folic acid 1milliGRAM(s) Oral daily  ascorbic acid 500milliGRAM(s) Oral daily  metoprolol succinate ER 25milliGRAM(s) Oral daily  furosemide Infusion 15mG/Hr IV Continuous <Continuous>  milrinone Infusion 0.5MICROgram(s)/kG/Min IV Continuous <Continuous>  phytonadione   Solution 5milliGRAM(s) Oral daily    MEDICATIONS  (PRN):  bisacodyl 5milliGRAM(s) Oral every 12 hours PRN Constipation  aluminum hydroxide/magnesium hydroxide/simethicone Suspension 30milliLiter(s) Oral every 6 hours PRN Dyspepsia      No Known Allergies      LABS:                        9.2    5.3   )-----------( 377      ( 09 Jun 2017 06:26 )             30.2     06-09    131<L>  |  92<L>  |  51<H>  ----------------------------<  79  4.1   |  21<L>  |  1.91<H>    Ca    9.0      09 Jun 2017 06:26  Phos  4.2     06-09  Mg     2.6     06-09    TPro  6.7  /  Alb  3.5  /  TBili  2.1<H>  /  DBili  x   /  AST  199<H>  /  ALT  128<H>  /  AlkPhos  98  06-09    PT/INR - ( 09 Jun 2017 06:26 )   PT: 26.8 sec;   INR: 2.41 ratio         PTT - ( 09 Jun 2017 06:26 )  PTT:34.6 sec Lactate Dehydrogenase, Serum: 353 U/L (06-09 @ 06:26)        RADIOLOGY & ADDITIONAL TESTS:  Studies reviewed

## 2017-06-09 NOTE — PROGRESS NOTE ADULT - PROBLEM SELECTOR PLAN 1
Patient with severe global LVSD with EF 15% in May. On Milrinone at home.  Patient net -626.6. Denies CP or SOB.    Continue Lasix gtt and Milrinone.   BMP q6hrs- replete electrolytes PRN.   Plan for RHC today give worsening HF during admission.  Continue LVAD evaluation. Follow up Heme recommendations re: anemia.  Will consult Dental today.   Follow up Heart Failure recommendations.   Strict I/Os. Daily Weights. Patient with severe global LVSD with EF 15% 5/2017 on palliative Milrinone  Patient net -626.6. Denies CP or SOB.    Continue Lasix gtt and Milrinone.   BMP q6hrs- replete electrolytes PRN.   Plan for RHC today give worsening HF during admission.  Continue LVAD evaluation. Follow up Heme recommendations re: anemia.  Will consult Dental today.   Follow up Heart Failure recommendations.   Strict I/Os. Daily Weights.

## 2017-06-09 NOTE — PROGRESS NOTE ADULT - ASSESSMENT
67 year old man history of advanced non-ischemic cardiomyopathy , bi-ventricular HFreF(EF15% 5/2017) s/p AICD on home milrinone 3mcg/kg/min, PAF on AC, VT, recent hospitalization 5/2017 for AICD firing with admission to the CCU presents with dizziness, N/V x 1 day after exertion a/w acute on chronic decompensted HF with CCU transfer for pressure support diuresis. Hospital course c/b [facility] likely 2/2 cardiorenal +/- postobstructive etiology, supra-therapeutic INR. 67 year old man history of advanced non-ischemic cardiomyopathy NHYA IV/AHA-D, bi-ventricular HFreF(EF15% 5/2017) s/p AICD on home milrinone 3mcg/kg/min, PAF on AC, VT, recent hospitalization 5/2017 for AICD firing with admission to the CCU presents with dizziness, N/V x 1 day after exertion a/w acute on chronic decompensated HF with CCU transfer for pressure support diuresis. Hospital course c/b [facility] likely 2/2 cardiorenal +/- postobstructive etiology, supra-therapeutic INR.

## 2017-06-09 NOTE — PROGRESS NOTE ADULT - ATTENDING COMMENTS
No events overnight.   Milrinone 0.5, Lasix 15. Cr  1.91 I/O  -607.8 ml  INR: 2.41 (s/p Vit K)    (81/69 - 99/78) Volume status difficult to evaluate.   Undergoing LVAD evaluation and education.   For RHC (and Nipride study) today which will dictate urgency for LVAD.  Repeat echo on milrnone to evaluate RV.   Accelerate LV AD eval.   Marvin Campbell

## 2017-06-09 NOTE — CONSULT NOTE ADULT - SUBJECTIVE AND OBJECTIVE BOX
Chief Complaint:  Patient is a 67y old  Male who presents with a chief complaint of Transfer from [facility] for management of ADHF/ LVAD eval (2017 00:32)      HPI: 66y/o M with advanced NICM BiV HFrEF 15-20% on a stable dose of milrinone at 3mcg/kg/min via PICC s/p AICD, with replacement from [employer] to [employer] (), PAF on AC and h/o VT, with recent admission for AICD firing, now presenting with dizziness and nausea/vomiting X 1 Day. He is being evaluated for LVAD.    GI consulted for history of melena with prior workup (EGD/CT colonography) showing large claen based gastric ulcer (negative for dysplasia and H. Pylori), done in 2017. Since discharge on PPI his stools have returned to normal and his Hb has bheen stable without the need for transfusion.    Patient initially presented to [facility], seen by cardiology. Labs notable for pro bnp 3424, Trop T 0.08, CK 50, hyponatremia to 133 (from 135 two weeks prior), INR elevated at 4.36, Cr 2.07 from BL 0.9-1, BR 1.8. EKG showing NSR with first degree block.       Allergies:  No Known Allergies      Home Medications:    Hospital Medications:  mexiletine 150milliGRAM(s) Oral three times a day  docusate sodium 100milliGRAM(s) Oral three times a day  senna 2Tablet(s) Oral at bedtime  multivitamin/minerals 1Tablet(s) Oral daily  calcium carbonate  625 mG + Vitamin D (OsCal 250 + D) 1Tablet(s) Oral daily  folic acid 1milliGRAM(s) Oral daily  ascorbic acid 500milliGRAM(s) Oral daily  bisacodyl 5milliGRAM(s) Oral every 12 hours PRN  metoprolol succinate ER 25milliGRAM(s) Oral daily  furosemide Infusion 15mG/Hr IV Continuous <Continuous>  milrinone Infusion 0.5MICROgram(s)/kG/Min IV Continuous <Continuous>  aluminum hydroxide/magnesium hydroxide/simethicone Suspension 30milliLiter(s) Oral every 6 hours PRN  phytonadione   Solution 5milliGRAM(s) Oral daily      PMHX/PSHX:  H/O prior ablation treatment  Ventricular fibrillation  PAF (paroxysmal atrial fibrillation)  Non-Ischemic Cardiomyopathy  SVT (Supraventricular Tachycardia)  HTN  CHF (Congestive Heart Failure)  Status post left hip replacement  Hypertension  Hypertension  History of Prior Ablation Treatment  AICD (Automatic Cardioverter/Defibrillator) Present      Family history:  No pertinent family history in first degree relatives      Social History:     ROS:     Complete ROS (-) other than above      PHYSICAL EXAM:     GENERAL:  Appears stated age, well-groomed, well-nourished, no distress  HEENT:  NC/AT,  conjunctivae clear and pink,  poor dentition  CHEST:  CTAB  HEART:  Regular rhythm, , no edema  ABDOMEN:  Soft, non-tender, non-distended,  no masses ,  EXTREMITIES:  no cyanosis,clubbing or edema  SKIN:  No rash/erythema/ecchymoses/petechiae/wounds/abscess/warm/dry  NEURO:  Alert, oriented    Vital Signs:  Vital Signs Last 24 Hrs  T(C): 36.6, Max: 36.8 ( @ 23:00)  T(F): 97.9, Max: 98.2 ( @ 23:00)  HR: 100 (94 - 102)  BP: 89/65 (81/69 - 97/72)  BP(mean): 75 (62 - 88)  RR: 13 (11 - 22)  SpO2: 98% (84% - 98%)  Daily     Daily Weight in k.7 (2017 02:00)    LABS:                        9.2    5.3   )-----------( 377      ( 2017 06:26 )             30.2         131<L>  |  92<L>  |  51<H>  ----------------------------<  79  4.1   |  21<L>  |  1.91<H>    Ca    9.0      2017 06:26  Phos  4.2       Mg     2.6         TPro  6.7  /  Alb  3.5  /  TBili  2.1<H>  /  DBili  x   /  AST  199<H>  /  ALT  128<H>  /  AlkPhos  98      LIVER FUNCTIONS - ( 2017 06:26 )  Alb: 3.5 g/dL / Pro: 6.7 g/dL / ALK PHOS: 98 U/L / ALT: 128 U/L RC / AST: 199 U/L / GGT: x           PT/INR - ( 2017 06:26 )   PT: 26.8 sec;   INR: 2.41 ratio         PTT - ( 2017 06:26 )  PTT:34.6 sec        Imaging:

## 2017-06-09 NOTE — CHART NOTE - NSCHARTNOTEFT_GEN_A_CORE
Source: Patient [X ]    Family [ ]     other [ ]  Pt with NICM  BiV HF with EF 15-20%, PICC line for home Milrinone, with NORMAN in CCU for pressure support. Pt undergoing LVAD evaluation, plan for RHC today, Pt needs Dental and GI evaluation.     Diet : Pt on DASH diet. Reports tolerating it well. PO intake is variable, Pt states if he likes the food he will eat it all.       Patient reports [ X] nausea  [ X] vomiting [ ] diarrhea [ ] constipation  [ ]chewing problems [ ] swallowing issues  [ ] other: Pt states he had 1 episode of vomiting today but is now NPO for RHC.      PO intake:  < 50% [ ] 50-75% [ ]   % [X ]  other :     Source for PO intake [X ] Patient [ ] family [ ] chart [X ] staff [ ] other     Enteral /Parenteral Nutrition: None       Current Weight: Weight (kg): 75 (06 @ 21:20). slight fluctuations noted, likely related to fluid shifts. +1 generalized edema.   % Weight Change    Pertinent Medications: MEDICATIONS  (STANDING):  mexiletine 150milliGRAM(s) Oral three times a day  docusate sodium 100milliGRAM(s) Oral three times a day  senna 2Tablet(s) Oral at bedtime  multivitamin/minerals 1Tablet(s) Oral daily  calcium carbonate  625 mG + Vitamin D (OsCal 250 + D) 1Tablet(s) Oral daily  folic acid 1milliGRAM(s) Oral daily  ascorbic acid 500milliGRAM(s) Oral daily  metoprolol succinate ER 25milliGRAM(s) Oral daily  furosemide Infusion 15mG/Hr IV Continuous <Continuous>  milrinone Infusion 0.5MICROgram(s)/kG/Min IV Continuous <Continuous>  phytonadione   Solution 5milliGRAM(s) Oral daily    MEDICATIONS  (PRN):  bisacodyl 5milliGRAM(s) Oral every 12 hours PRN Constipation  aluminum hydroxide/magnesium hydroxide/simethicone Suspension 30milliLiter(s) Oral every 6 hours PRN Dyspepsia    Pertinent Labs:   Na131 mmol/L<L> Glu 79 mg/dL K+ 4.1 mmol/L Cr  1.91 mg/dL<H> BUN 51 mg/dL<H> Phos 4.2 mg/dL Alb 3.5 g/dL, H/H:9.2/30.2-low, CaL9.0, total Protein: 6.7, total Bilirubin:2.1-high, Alk Phos: 98, Ast:199-high, Alt: 128-high, Ma.6       Skin: Intact     Estimated Needs:   [ X] no change since previous assessment  [ ] recalculated:       Previous Nutrition Diagnosis:     [ ] Inadequate Energy Intake [ ]Inadequate Oral Intake [ ] Excessive Energy Intake     [ ] Underweight [ ] Increased Nutrient Needs [ ] Overweight/Obesity     [ ] Altered GI Function [ ] Unintended Weight Loss [X ] Food & Nutrition Related Knowledge Deficit [X ] Malnutrition          Nutrition Diagnosis is {X] ongoing  [ ] resolved [ ] not applicable   Pt denies to review diet education at this time, states he's frustrated with food options and is going to [facility] today. Pt willing to review education at later date.   Malnutrition being addressed with fair to good PO intake, health shakes x2 and food preferences.          New Nutrition Diagnosis: [ ] not applicable    [ ] Inadequate Protein Energy Intake [ ]Inadequate Oral Intake [ ] Excessive Energy Intake     [ ] Underweight [ ] Increased Nutrient Needs [ ] Overweight/Obesity     [ ] Altered GI Function [ ] Unintended Weight Loss [ ] Food & Nutrition Related Knowledge Deficit[ ] Limited Adherence to nutrition related recommendations [ ] Malnutrition  [ ] other: Free text       Related to:      As evidenced by:      Interventions:     Recommend    [ ] Change Diet To:    [ ] Nutrition Supplement    [ ] Nutrition Support    [X ] Other: 1. Provide food preferences as requested by Pt/family within diet restrictions  2. Encourage PO intake during meal times 3. Reviewed menu ordering procedures 4. [place] diet education 5. Continue health shakes x2.        Monitoring and Evaluation:     [ X] PO intake [ X] Tolerance to diet prescription [ X] weights [X ] follow up per protocol    [X ] other: RD remains available, Sarah Siegler RD. Pager #[phone]

## 2017-06-09 NOTE — PROGRESS NOTE ADULT - ATTENDING COMMENTS
INR still high,   -to correct,increase vitamin k therapy  -check factor VII given prolonged INR  -also check an antifactor Xa given patient taking xarelto at home

## 2017-06-09 NOTE — PROGRESS NOTE ADULT - PROBLEM SELECTOR PLAN 7
Likely secondary to volume overload.  Stable.   No mental status changes.   Continue diuresis. Will monitor.

## 2017-06-09 NOTE — PROGRESS NOTE ADULT - PROBLEM SELECTOR PLAN 6
Normocytic.  Follow up iron panel, folate, B12, LDH, Haptoglobin. Labs consistent with iron deficiency anemia. Currently on iron supplements  Will follow up Heme recommendations.

## 2017-06-09 NOTE — CONSULT NOTE ADULT - ASSESSMENT
1) Acute decompensated heart failure  2) HFrEF EF 10-15% pending LVAD  3) Hx of GI bleed likely secondary to gastric ulcer (low risk stigmata on prior EGD).  4) Supratherapeutic INR, ? factor deficient.    - Given lack of bleeding while INR 7, high cardiac risk for GI proecdure, and low risk features of prior GI bleed, the patient appears to be low risk for GI bleed while anticoagulated.  We will not pursue repeat endoscopy unless recurrent overt GI bleeding or hemodynamically significant drop in Hemoglobin.  - No GI contraindication to anticoagulation as needed for cardiac protection.  - PPI PO daily for ulcer history and need for anticoagulation.

## 2017-06-09 NOTE — PROGRESS NOTE ADULT - ASSESSMENT
66y/o M with advanced NICM BiV HFrEF 15-20% on a stable dose of milrinone at 3mcg/kg/min via PICC s/p AICD, with replacement from [facility] to [employer] (2009), PAF on AC and h/o VT, with recent admission for AICD firing, now with decompensated heart failure.

## 2017-06-09 NOTE — PROGRESS NOTE ADULT - PROBLEM SELECTOR PLAN 8
Xarelto held in setting of therapeutic INR. Plan for RHC today. Continue to hold Xarelto. Plan for RHC today.

## 2017-06-09 NOTE — PROGRESS NOTE ADULT - ASSESSMENT
68y/o M with NICM BiV HFrEF 15-20% on a stable dose of milrinone at 3mcg/kg/min via PICC s/p AICD, with replacement from [employer] to [employer] (2009), PAF on AC and h/o VT, with recent admission for AICD firing, now presenting with dizziness and nausea/vomiting X 1d, now with increased milrinone requirement and elevated INR.  - C/w milrinone @ 0.500 for now, plan for RHC today now INR less than 2.5  - JVP difficult to assess would c/w Lasix gtt as is for now, net negative and Cr stable from yesterday  - Continue LVAD eval, needs a dental eval and GI eval (? outpatient colonoscopy)

## 2017-06-09 NOTE — PROGRESS NOTE ADULT - SUBJECTIVE AND OBJECTIVE BOX
Interval History:    No o/n events, feels better than yesterday.    Review Of Systems:  See above, otherwise, complete 10 point review of systems negative.    [ ]Unable to obtain    Medications:  mexiletine 150milliGRAM(s) Oral three times a day  docusate sodium 100milliGRAM(s) Oral three times a day  senna 2Tablet(s) Oral at bedtime  multivitamin/minerals 1Tablet(s) Oral daily  calcium carbonate  625 mG + Vitamin D (OsCal 250 + D) 1Tablet(s) Oral daily  folic acid 1milliGRAM(s) Oral daily  ascorbic acid 500milliGRAM(s) Oral daily  bisacodyl 5milliGRAM(s) Oral every 12 hours PRN  metoprolol succinate ER 25milliGRAM(s) Oral daily  furosemide Infusion 15mG/Hr IV Continuous <Continuous>  milrinone Infusion 0.5MICROgram(s)/kG/Min IV Continuous <Continuous>  aluminum hydroxide/magnesium hydroxide/simethicone Suspension 30milliLiter(s) Oral every 6 hours PRN  phytonadione   Solution 5milliGRAM(s) Oral daily    Vitals:  T(C): 36.7, Max: 36.8 (-08 @ 23:00)  HR: 100 (94 - 104)  BP: 87/73 (81/69 - 99/78)  BP(mean): 78 (70 - 88)  ABP: --  ABP(mean): --  RR: 18 (12 - 24)  SpO2: 97% (84% - 97%)  Wt(kg): --  CVP(cm H2O): --  CO: --  CI: --  PA: --  PA(mean): --  PCWP: --  SVR: --  PVR: --    Daily     Daily Weight in k.7 (2017 02:00)    I&O's Summary  I & Os for 24h ending 2017 07:00  =============================================  IN: 1142.2 ml / OUT: 1750 ml / NET: -607.8 ml    I & Os for current day (as of 2017 08:50)  =============================================  IN: 18.8 ml / OUT: 225 ml / NET: -206.2 ml      Physical Exam:  Appearance: No Acute Distress  HEENT:  JVP difficult to assess, appears < 8cm  Cardiovascular: Normal S1 S2, No murmurs/rubs/gallops  Respiratory: Clear to auscultation bilaterally  Gastrointestinal:  Soft, Non-tender	  Skin: No cyanosis	  Neurologic: Non-focal  Extremities: No clubbing, cyanosis or edema  Psychiatry: A & O x 3, Mood & affect appropriate      Labs:                        9.2    5.3   )-----------( 377      ( 2017 06:26 )             30.2         131<L>  |  92<L>  |  51<H>  ----------------------------<  79  4.1   |  21<L>  |  1.91<H>    Ca    9.0      2017 06:26  Phos  4.2       Mg     2.6         TPro  6.7  /  Alb  3.5  /  TBili  2.1<H>  /  DBili  x   /  AST  199<H>  /  ALT  128<H>  /  AlkPhos  98      PT/INR - ( 2017 06:26 )   PT: 26.8 sec;   INR: 2.41 ratio         PTT - ( 2017 06:26 )  PTT:34.6 sec      Serum Pro-Brain Natriuretic Peptide: 3424 pg/mL ( @ 17:42)      Lactate Dehydrogenase, Serum: 353 U/L ( @ 06:26)  Lactate Dehydrogenase, Serum: 307 U/L ( @ 03:10)    Lactate, Blood: 1.7 mmol/L ( @ 06:27)  Lactate, Blood: 2.2 mmol/L ( @ 17:33)        TELEMETRY: 	  Frequent PVCs  ECG:      [ ] Echocardiogram:    -----------------------------------------------------------------  [ ] Congestive Heart Failure                  [ ] Acute                                                [ ] Acute on Chronic     [ ] Chronic  [ ] Diastolic (HFpEF)  [ ] Systolic (HFrEF)  [ ] Combined Systolic & Diastolic      [ ] ACC/AHA Stage: _____     [ ] NYHA Class: _____ Interval History:    No o/n events, feels better than yesterday.    Review Of Systems:  See above, otherwise, complete 10 point review of systems negative.    [ ]Unable to obtain    Medications:  mexiletine 150milliGRAM(s) Oral three times a day  docusate sodium 100milliGRAM(s) Oral three times a day  senna 2Tablet(s) Oral at bedtime  multivitamin/minerals 1Tablet(s) Oral daily  calcium carbonate  625 mG + Vitamin D (OsCal 250 + D) 1Tablet(s) Oral daily  folic acid 1milliGRAM(s) Oral daily  ascorbic acid 500milliGRAM(s) Oral daily  bisacodyl 5milliGRAM(s) Oral every 12 hours PRN  metoprolol succinate ER 25milliGRAM(s) Oral daily  furosemide Infusion 15mG/Hr IV Continuous <Continuous>  milrinone Infusion 0.5MICROgram(s)/kG/Min IV Continuous <Continuous>  aluminum hydroxide/magnesium hydroxide/simethicone Suspension 30milliLiter(s) Oral every 6 hours PRN  phytonadione   Solution 5milliGRAM(s) Oral daily    Vitals:  T(C): 36.7, Max: 36.8 (-08 @ 23:00)  HR: 100 (94 - 104)  BP: 87/73 (81/69 - 99/78)  BP(mean): 78 (70 - 88)  ABP: --  ABP(mean): --  RR: 18 (12 - 24)  SpO2: 97% (84% - 97%)  Wt(kg): --  CVP(cm H2O): --  CO: --  CI: --  PA: --  PA(mean): --  PCWP: --  SVR: --  PVR: --    Daily     Daily Weight in k.7 (2017 02:00)    I&O's Summary  I & Os for 24h ending 2017 07:00  =============================================  IN: 1142.2 ml / OUT: 1750 ml / NET: -607.8 ml    I & Os for current day (as of 2017 08:50)  =============================================  IN: 18.8 ml / OUT: 225 ml / NET: -206.2 ml      Physical Exam:  Appearance: No Acute Distress  HEENT:  JVP difficult to assess, appears < 8cm  Cardiovascular: Normal S1 S2, No murmurs/rubs/gallops  Respiratory: Clear to auscultation bilaterally  Gastrointestinal:  Soft, Non-tender	  Skin: No cyanosis	  Neurologic: Non-focal  Extremities: No clubbing, cyanosis or edema  Psychiatry: A & O x 3, Mood & affect appropriate      Labs:                        9.2    5.3   )-----------( 377      ( 2017 06:26 )             30.2         131<L>  |  92<L>  |  51<H>  ----------------------------<  79  4.1   |  21<L>  | <H>    Ca    9.0      2017 06:26  Phos  4.2       Mg     2.6         TPro  6.7  /  Alb  3.5  /  TBili  2.1<H>  /  DBili  x   /  AST  199<H>  /  ALT  128<H>  /  AlkPhos  98      PT/INR - ( 2017 06:26 )   PT: 26.8 sec;   INR: 2.41 ratio         PTT - ( 2017 06:26 )  PTT:34.6 sec      Serum Pro-Brain Natriuretic Peptide: 3424 pg/mL ( @ 17:42)      Lactate Dehydrogenase, Serum: 353 U/L ( @ 06:26)  Lactate Dehydrogenase, Serum: 307 U/L ( @ 03:10)    Lactate, Blood: 1.7 mmol/L ( @ 06:27)  Lactate, Blood: 2.2 mmol/L ( @ 17:33)        TELEMETRY: 	  Frequent PVCs  ECG:      [ ] Echocardiogram:    -----------------------------------------------------------------  [ ] Congestive Heart Failure                  [ ] Acute                                                [ ] Acute on Chronic     [ ] Chronic  [ ] Diastolic (HFpEF)  [ ] Systolic (HFrEF)  [ ] Combined Systolic & Diastolic      [ ] ACC/AHA Stage: _____     [ ] NYHA Class: _____

## 2017-06-09 NOTE — PROGRESS NOTE ADULT - SUBJECTIVE AND OBJECTIVE BOX
[facility] DIVISION OF KIDNEY DISEASES AND HYPERTENSION -- FOLLOW UP NOTE  --------------------------------------------------------------------------------      24hour events/Subjective:    No events overnight. Plan for right heart cath today. Darian saavedra/kendra.    PAST HISTORY  --------------------------------------------------------------------------------  No significant changes to PMH, PSH, FHx, SHx, unless otherwise noted    ALLERGIES & MEDICATIONS  --------------------------------------------------------------------------------  Allergies    No Known Allergies    Intolerances      Standing Inpatient Medications  mexiletine 150milliGRAM(s) Oral three times a day  docusate sodium 100milliGRAM(s) Oral three times a day  senna 2Tablet(s) Oral at bedtime  multivitamin/minerals 1Tablet(s) Oral daily  calcium carbonate  625 mG + Vitamin D (OsCal 250 + D) 1Tablet(s) Oral daily  folic acid 1milliGRAM(s) Oral daily  ascorbic acid 500milliGRAM(s) Oral daily  metoprolol succinate ER 25milliGRAM(s) Oral daily  furosemide Infusion 15mG/Hr IV Continuous <Continuous>  milrinone Infusion 0.5MICROgram(s)/kG/Min IV Continuous <Continuous>  phytonadione   Solution 5milliGRAM(s) Oral daily    PRN Inpatient Medications  bisacodyl 5milliGRAM(s) Oral every 12 hours PRN  aluminum hydroxide/magnesium hydroxide/simethicone Suspension 30milliLiter(s) Oral every 6 hours PRN      REVIEW OF SYSTEMS  --------------------------------------------------------------------------------  As above         VITALS/PHYSICAL EXAM  --------------------------------------------------------------------------------  T(C): 36.7, Max: 36.8 (06-08 @ 23:00)  HR: 100 (94 - 104)  BP: 87/73 (81/69 - 99/78)  RR: 18 (12 - 24)  SpO2: 97% (84% - 97%)  Wt(kg): --      I & Os for 24h ending 06-09-17 @ 07:00  =============================================  IN: 1142.2 ml / OUT: 1750 ml / NET: -607.8 ml    I & Os for current day (as of 06-09-17 @ 08:40)  =============================================  IN: 18.8 ml / OUT: 225 ml / NET: -206.2 ml    Physical Exam:  	Gen: NAD  	HEENT: MMM  	Pulm: CTA B/L  	CV: S1S2  	Abd: Soft, +BS  	Ext: No LE edema B/L                      Neuro: Awake   	Skin: Warm and Dry , No rashes   	    LABS/STUDIES  --------------------------------------------------------------------------------              9.2    5.3   >-----------<  377      [06-09-17 @ 06:26]              30.2     131  |  92  |  51  ----------------------------<  79      [06-09-17 @ 06:26]  4.1   |  21  |  1.91        Ca     9.0     [06-09-17 @ 06:26]      Mg     2.6     [06-09-17 @ 06:26]      Phos  4.2     [06-09-17 @ 06:26]    TPro  6.7  /  Alb  3.5  /  TBili  2.1  /  DBili  x   /  AST  199  /  ALT  128  /  AlkPhos  98  [06-09-17 @ 06:26]    PT/INR: PT 26.8 , INR 2.41       [06-09-17 @ 06:26]  PTT: 34.6       [06-09-17 @ 06:26]          [06-09-17 @ 06:26]    Creatinine Trend:  SCr 1.91 [06-09 @ 06:26]  SCr 1.93 [06-08 @ 17:33]  SCr 1.93 [06-08 @ 03:10]  SCr 2.06 [06-07 @ 20:12]  SCr 2.06 [06-07 @ 13:39]    Urinalysis - [06-03-17 @ 10:46]      Color Mary / Appearance Clear / SG 1.012 / pH 5.0      Gluc Negative / Ketone Negative  / Bili Negative / Urobili 1.0       Blood Negative / Protein Negative / Leuk Est Negative / Nitrite Negative      RBC 2 / WBC 1 / Hyaline 6 / Gran  / Sq Epi  / Non Sq Epi 0 / Bacteria Negative    Urine Creatinine 131      [06-03-17 @ 10:46]  Urine Protein 24      [06-03-17 @ 10:46]  Urine Sodium <20      [06-03-17 @ 10:46]  Urine Urea Nitrogen 499      [06-03-17 @ 10:46]    Iron 20, TIBC 352, %sat 6      [06-08-17 @ 07:14]  Ferritin 112.0      [05-15-17 @ 13:47]  HbA1c 5.5      [06-07-17 @ 06:14]  TSH 3.54      [06-07-17 @ 06:14]  Lipid: chol 62, TG 33, HDL 17, LDL 38      [06-07-17 @ 06:14]    HBsAb Nonreact      [05-15-17 @ 15:40]  HBsAg Nonreact      [05-15-17 @ 15:40]  HBcAb Nonreact      [05-15-17 @ 15:40]  HCV 0.17, Nonreact      [05-15-17 @ 15:40]  HIV Nonreact      [05-15-17 @ 13:47] [facility] DIVISION OF KIDNEY DISEASES AND HYPERTENSION -- FOLLOW UP NOTE  --------------------------------------------------------------------------------      24hour events/Subjective:    No events overnight. Plan for right heart cath today. Darian saavedra/kendra.     PAST HISTORY  --------------------------------------------------------------------------------  No significant changes to PMH, PSH, FHx, SHx, unless otherwise noted    ALLERGIES & MEDICATIONS  --------------------------------------------------------------------------------  Allergies    No Known Allergies    Intolerances      Standing Inpatient Medications  mexiletine 150milliGRAM(s) Oral three times a day  docusate sodium 100milliGRAM(s) Oral three times a day  senna 2Tablet(s) Oral at bedtime  multivitamin/minerals 1Tablet(s) Oral daily  calcium carbonate  625 mG + Vitamin D (OsCal 250 + D) 1Tablet(s) Oral daily  folic acid 1milliGRAM(s) Oral daily  ascorbic acid 500milliGRAM(s) Oral daily  metoprolol succinate ER 25milliGRAM(s) Oral daily  furosemide Infusion 15mG/Hr IV Continuous <Continuous>  milrinone Infusion 0.5MICROgram(s)/kG/Min IV Continuous <Continuous>  phytonadione   Solution 5milliGRAM(s) Oral daily    PRN Inpatient Medications  bisacodyl 5milliGRAM(s) Oral every 12 hours PRN  aluminum hydroxide/magnesium hydroxide/simethicone Suspension 30milliLiter(s) Oral every 6 hours PRN      REVIEW OF SYSTEMS  --------------------------------------------------------------------------------  As above         VITALS/PHYSICAL EXAM  --------------------------------------------------------------------------------  T(C): 36.7, Max: 36.8 (06-08 @ 23:00)  HR: 100 (94 - 104)  BP: 87/73 (81/69 - 99/78)  RR: 18 (12 - 24)  SpO2: 97% (84% - 97%)  Wt(kg): --      I & Os for 24h ending 06-09-17 @ 07:00  =============================================  IN: 1142.2 ml / OUT: 1750 ml / NET: -607.8 ml    I & Os for current day (as of 06-09-17 @ 08:40)  =============================================  IN: 18.8 ml / OUT: 225 ml / NET: -206.2 ml    Physical Exam:  	Gen: NAD  	HEENT: MMM, no JVP  	Pulm: CTA B/L  	CV: S1S2, no rub  	Abd: Soft, +BS  	Ext: No LE edema B/L              Neuro: Awake   	Skin: Warm and Dry , No rashes               Other: no diaz  	    LABS/STUDIES  --------------------------------------------------------------------------------              9.2    5.3   >-----------<  377      [06-09-17 @ 06:26]              30.2     131  |  92  |  51  ----------------------------<  79      [06-09-17 @ 06:26]  4.1   |  21  |  1.91        Ca     9.0     [06-09-17 @ 06:26]      Mg     2.6     [06-09-17 @ 06:26]      Phos  4.2     [06-09-17 @ 06:26]    TPro  6.7  /  Alb  3.5  /  TBili  2.1  /  DBili  x   /  AST  199  /  ALT  128  /  AlkPhos  98  [06-09-17 @ 06:26]    PT/INR: PT 26.8 , INR 2.41       [06-09-17 @ 06:26]  PTT: 34.6       [06-09-17 @ 06:26]          [06-09-17 @ 06:26]    Creatinine Trend:  SCr 1.91 [06-09 @ 06:26]  SCr 1.93 [06-08 @ 17:33]  SCr 1.93 [06-08 @ 03:10]  SCr 2.06 [06-07 @ 20:12]  SCr 2.06 [06-07 @ 13:39]    Urinalysis - [06-03-17 @ 10:46]      Color Mary / Appearance Clear / SG 1.012 / pH 5.0      Gluc Negative / Ketone Negative  / Bili Negative / Urobili 1.0       Blood Negative / Protein Negative / Leuk Est Negative / Nitrite Negative      RBC 2 / WBC 1 / Hyaline 6 / Gran  / Sq Epi  / Non Sq Epi 0 / Bacteria Negative    Urine Creatinine 131      [06-03-17 @ 10:46]  Urine Protein 24      [06-03-17 @ 10:46]  Urine Sodium <20      [06-03-17 @ 10:46]  Urine Urea Nitrogen 499      [06-03-17 @ 10:46]    Iron 20, TIBC 352, %sat 6      [06-08-17 @ 07:14]  Ferritin 112.0      [05-15-17 @ 13:47]  HbA1c 5.5      [06-07-17 @ 06:14]  TSH 3.54      [06-07-17 @ 06:14]  Lipid: chol 62, TG 33, HDL 17, LDL 38      [06-07-17 @ 06:14]    HBsAb Nonreact      [05-15-17 @ 15:40]  HBsAg Nonreact      [05-15-17 @ 15:40]  HBcAb Nonreact      [05-15-17 @ 15:40]  HCV 0.17, Nonreact      [05-15-17 @ 15:40]  HIV Nonreact      [05-15-17 @ 13:47]

## 2017-06-10 DIAGNOSIS — K04.7 PERIAPICAL ABSCESS WITHOUT SINUS: ICD-10-CM

## 2017-06-10 DIAGNOSIS — K25.9 GASTRIC ULCER, UNSPECIFIED AS ACUTE OR CHRONIC, WITHOUT HEMORRHAGE OR PERFORATION: ICD-10-CM

## 2017-06-10 LAB
ALBUMIN SERPL ELPH-MCNC: 3.2 G/DL — LOW (ref 3.3–5)
ALBUMIN SERPL ELPH-MCNC: 3.3 G/DL — SIGNIFICANT CHANGE UP (ref 3.3–5)
ALBUMIN SERPL ELPH-MCNC: 3.4 G/DL — SIGNIFICANT CHANGE UP (ref 3.3–5)
ALP SERPL-CCNC: 82 U/L — SIGNIFICANT CHANGE UP (ref 40–120)
ALP SERPL-CCNC: 84 U/L — SIGNIFICANT CHANGE UP (ref 40–120)
ALP SERPL-CCNC: 95 U/L — SIGNIFICANT CHANGE UP (ref 40–120)
ALT FLD-CCNC: 139 U/L RC — HIGH (ref 10–45)
ALT FLD-CCNC: 139 U/L RC — HIGH (ref 10–45)
ALT FLD-CCNC: 144 U/L RC — HIGH (ref 10–45)
ANION GAP SERPL CALC-SCNC: 15 MMOL/L — SIGNIFICANT CHANGE UP (ref 5–17)
ANION GAP SERPL CALC-SCNC: 17 MMOL/L — SIGNIFICANT CHANGE UP (ref 5–17)
ANION GAP SERPL CALC-SCNC: 18 MMOL/L — HIGH (ref 5–17)
APTT BLD: 36.2 SEC — SIGNIFICANT CHANGE UP (ref 27.5–37.4)
APTT BLD: 55.1 SEC — HIGH (ref 27.5–37.4)
APTT BLD: > 200 SEC (ref 27.5–37.4)
AST SERPL-CCNC: 199 U/L — HIGH (ref 10–40)
AST SERPL-CCNC: 303 U/L — HIGH (ref 10–40)
AST SERPL-CCNC: 402 U/L — HIGH (ref 10–40)
BASE EXCESS BLDMV CALC-SCNC: 0 MMOL/L — SIGNIFICANT CHANGE UP (ref -3–3)
BASE EXCESS BLDMV CALC-SCNC: 0.5 MMOL/L — SIGNIFICANT CHANGE UP (ref -3–3)
BASE EXCESS BLDMV CALC-SCNC: 2.3 MMOL/L — SIGNIFICANT CHANGE UP (ref -3–3)
BASOPHILS # BLD AUTO: 0 K/UL — SIGNIFICANT CHANGE UP (ref 0–0.2)
BASOPHILS NFR BLD AUTO: 0.2 % — SIGNIFICANT CHANGE UP (ref 0–2)
BILIRUB SERPL-MCNC: 2 MG/DL — HIGH (ref 0.2–1.2)
BILIRUB SERPL-MCNC: 2.7 MG/DL — HIGH (ref 0.2–1.2)
BILIRUB SERPL-MCNC: 5.5 MG/DL — HIGH (ref 0.2–1.2)
BLD GP AB SCN SERPL QL: NEGATIVE — SIGNIFICANT CHANGE UP
BUN SERPL-MCNC: 41 MG/DL — HIGH (ref 7–23)
BUN SERPL-MCNC: 43 MG/DL — HIGH (ref 7–23)
BUN SERPL-MCNC: 47 MG/DL — HIGH (ref 7–23)
CALCIUM SERPL-MCNC: 8.5 MG/DL — SIGNIFICANT CHANGE UP (ref 8.4–10.5)
CALCIUM SERPL-MCNC: 8.7 MG/DL — SIGNIFICANT CHANGE UP (ref 8.4–10.5)
CALCIUM SERPL-MCNC: 8.9 MG/DL — SIGNIFICANT CHANGE UP (ref 8.4–10.5)
CHLORIDE SERPL-SCNC: 95 MMOL/L — LOW (ref 96–108)
CHLORIDE SERPL-SCNC: 95 MMOL/L — LOW (ref 96–108)
CHLORIDE SERPL-SCNC: 96 MMOL/L — SIGNIFICANT CHANGE UP (ref 96–108)
CO2 BLDMV-SCNC: 25 MMOL/L — SIGNIFICANT CHANGE UP (ref 21–29)
CO2 BLDMV-SCNC: 26 MMOL/L — SIGNIFICANT CHANGE UP (ref 21–29)
CO2 BLDMV-SCNC: 27 MMOL/L — SIGNIFICANT CHANGE UP (ref 21–29)
CO2 SERPL-SCNC: 20 MMOL/L — LOW (ref 22–31)
CO2 SERPL-SCNC: 21 MMOL/L — LOW (ref 22–31)
CO2 SERPL-SCNC: 23 MMOL/L — SIGNIFICANT CHANGE UP (ref 22–31)
CREAT SERPL-MCNC: 1.58 MG/DL — HIGH (ref 0.5–1.3)
CREAT SERPL-MCNC: 1.58 MG/DL — HIGH (ref 0.5–1.3)
CREAT SERPL-MCNC: 1.69 MG/DL — HIGH (ref 0.5–1.3)
EOSINOPHIL # BLD AUTO: 0 K/UL — SIGNIFICANT CHANGE UP (ref 0–0.5)
EOSINOPHIL NFR BLD AUTO: 0.8 % — SIGNIFICANT CHANGE UP (ref 0–6)
FIBRINOGEN PPP-MCNC: 253 MG/DL — LOW (ref 310–510)
FIBRINOGEN PPP-MCNC: 261 MG/DL — LOW (ref 310–510)
GAS PNL BLDMV: SIGNIFICANT CHANGE UP
GLUCOSE SERPL-MCNC: 91 MG/DL — SIGNIFICANT CHANGE UP (ref 70–99)
GLUCOSE SERPL-MCNC: 97 MG/DL — SIGNIFICANT CHANGE UP (ref 70–99)
GLUCOSE SERPL-MCNC: 98 MG/DL — SIGNIFICANT CHANGE UP (ref 70–99)
HCO3 BLDMV-SCNC: 24 MMOL/L — SIGNIFICANT CHANGE UP (ref 20–28)
HCO3 BLDMV-SCNC: 24 MMOL/L — SIGNIFICANT CHANGE UP (ref 20–28)
HCO3 BLDMV-SCNC: 26 MMOL/L — SIGNIFICANT CHANGE UP (ref 20–28)
HCT VFR BLD CALC: 28.1 % — LOW (ref 39–50)
HCT VFR BLD CALC: 29.4 % — LOW (ref 39–50)
HCT VFR BLD CALC: 30.4 % — LOW (ref 39–50)
HGB BLD-MCNC: 8.8 G/DL — LOW (ref 13–17)
HGB BLD-MCNC: 9.2 G/DL — LOW (ref 13–17)
HGB BLD-MCNC: 9.3 G/DL — LOW (ref 13–17)
HOROWITZ INDEX BLDMV+IHG-RTO: 21 — SIGNIFICANT CHANGE UP
HOROWITZ INDEX BLDMV+IHG-RTO: 21 — SIGNIFICANT CHANGE UP
INR BLD: 1.81 RATIO — HIGH (ref 0.88–1.16)
INR BLD: 2.17 RATIO — HIGH (ref 0.88–1.16)
INR BLD: 2.23 RATIO — HIGH (ref 0.88–1.16)
LACTATE BLDV-MCNC: 1.1 MMOL/L — SIGNIFICANT CHANGE UP (ref 0.7–2)
LACTATE BLDV-MCNC: 1.2 MMOL/L — SIGNIFICANT CHANGE UP (ref 0.7–2)
LDH SERPL L TO P-CCNC: 1404 U/L — HIGH (ref 50–242)
LDH SERPL L TO P-CCNC: 989 U/L — HIGH (ref 50–242)
LYMPHOCYTES # BLD AUTO: 0.8 K/UL — LOW (ref 1–3.3)
LYMPHOCYTES # BLD AUTO: 14.1 % — SIGNIFICANT CHANGE UP (ref 13–44)
MAGNESIUM SERPL-MCNC: 2.4 MG/DL — SIGNIFICANT CHANGE UP (ref 1.6–2.6)
MAGNESIUM SERPL-MCNC: 2.4 MG/DL — SIGNIFICANT CHANGE UP (ref 1.6–2.6)
MAGNESIUM SERPL-MCNC: 2.5 MG/DL — SIGNIFICANT CHANGE UP (ref 1.6–2.6)
MCHC RBC-ENTMCNC: 27 PG — SIGNIFICANT CHANGE UP (ref 27–34)
MCHC RBC-ENTMCNC: 27.1 PG — SIGNIFICANT CHANGE UP (ref 27–34)
MCHC RBC-ENTMCNC: 27.3 PG — SIGNIFICANT CHANGE UP (ref 27–34)
MCHC RBC-ENTMCNC: 30.5 GM/DL — LOW (ref 32–36)
MCHC RBC-ENTMCNC: 31.1 GM/DL — LOW (ref 32–36)
MCHC RBC-ENTMCNC: 31.2 GM/DL — LOW (ref 32–36)
MCV RBC AUTO: 86.8 FL — SIGNIFICANT CHANGE UP (ref 80–100)
MCV RBC AUTO: 87.9 FL — SIGNIFICANT CHANGE UP (ref 80–100)
MCV RBC AUTO: 88.7 FL — SIGNIFICANT CHANGE UP (ref 80–100)
MONOCYTES # BLD AUTO: 0.6 K/UL — SIGNIFICANT CHANGE UP (ref 0–0.9)
MONOCYTES NFR BLD AUTO: 10.9 % — SIGNIFICANT CHANGE UP (ref 2–14)
NEUTROPHILS # BLD AUTO: 4.2 K/UL — SIGNIFICANT CHANGE UP (ref 1.8–7.4)
NEUTROPHILS NFR BLD AUTO: 74 % — SIGNIFICANT CHANGE UP (ref 43–77)
O2 CT VFR BLD CALC: 26 MMHG — LOW (ref 30–65)
O2 CT VFR BLD CALC: 35 MMHG — SIGNIFICANT CHANGE UP (ref 30–65)
O2 CT VFR BLD CALC: 38 MMHG — SIGNIFICANT CHANGE UP (ref 30–65)
PCO2 BLDMV: 36 MMHG — SIGNIFICANT CHANGE UP (ref 30–65)
PCO2 BLDMV: 38 MMHG — SIGNIFICANT CHANGE UP (ref 30–65)
PCO2 BLDMV: 40 MMHG — SIGNIFICANT CHANGE UP (ref 30–65)
PH BLDMV: 7.42 — SIGNIFICANT CHANGE UP (ref 7.32–7.45)
PH BLDMV: 7.43 — SIGNIFICANT CHANGE UP (ref 7.32–7.45)
PH BLDMV: 7.43 — SIGNIFICANT CHANGE UP (ref 7.32–7.45)
PHOSPHATE SERPL-MCNC: 3.4 MG/DL — SIGNIFICANT CHANGE UP (ref 2.5–4.5)
PHOSPHATE SERPL-MCNC: 3.7 MG/DL — SIGNIFICANT CHANGE UP (ref 2.5–4.5)
PHOSPHATE SERPL-MCNC: 3.9 MG/DL — SIGNIFICANT CHANGE UP (ref 2.5–4.5)
PLATELET # BLD AUTO: 283 K/UL — SIGNIFICANT CHANGE UP (ref 150–400)
PLATELET # BLD AUTO: 296 K/UL — SIGNIFICANT CHANGE UP (ref 150–400)
PLATELET # BLD AUTO: 303 K/UL — SIGNIFICANT CHANGE UP (ref 150–400)
POTASSIUM SERPL-MCNC: 3.5 MMOL/L — SIGNIFICANT CHANGE UP (ref 3.5–5.3)
POTASSIUM SERPL-MCNC: 3.7 MMOL/L — SIGNIFICANT CHANGE UP (ref 3.5–5.3)
POTASSIUM SERPL-MCNC: 4.5 MMOL/L — SIGNIFICANT CHANGE UP (ref 3.5–5.3)
POTASSIUM SERPL-SCNC: 3.5 MMOL/L — SIGNIFICANT CHANGE UP (ref 3.5–5.3)
POTASSIUM SERPL-SCNC: 3.7 MMOL/L — SIGNIFICANT CHANGE UP (ref 3.5–5.3)
POTASSIUM SERPL-SCNC: 4.5 MMOL/L — SIGNIFICANT CHANGE UP (ref 3.5–5.3)
PROT SERPL-MCNC: 6.6 G/DL — SIGNIFICANT CHANGE UP (ref 6–8.3)
PROT SERPL-MCNC: 6.6 G/DL — SIGNIFICANT CHANGE UP (ref 6–8.3)
PROT SERPL-MCNC: 6.8 G/DL — SIGNIFICANT CHANGE UP (ref 6–8.3)
PROTHROM AB SERPL-ACNC: 20 SEC — HIGH (ref 9.8–12.7)
PROTHROM AB SERPL-ACNC: 23.8 SEC — HIGH (ref 9.8–12.7)
PROTHROM AB SERPL-ACNC: 24.7 SEC — HIGH (ref 9.8–12.7)
RBC # BLD: 3.24 M/UL — LOW (ref 4.2–5.8)
RBC # BLD: 3.35 M/UL — LOW (ref 4.2–5.8)
RBC # BLD: 3.43 M/UL — LOW (ref 4.2–5.8)
RBC # FLD: 19.6 % — HIGH (ref 10.3–14.5)
RBC # FLD: 19.9 % — HIGH (ref 10.3–14.5)
RBC # FLD: 20.7 % — HIGH (ref 10.3–14.5)
RH IG SCN BLD-IMP: POSITIVE — SIGNIFICANT CHANGE UP
SAO2 % BLDMV: 36 % — LOW (ref 60–90)
SAO2 % BLDMV: 60 % — SIGNIFICANT CHANGE UP (ref 60–90)
SAO2 % BLDMV: 66 % — SIGNIFICANT CHANGE UP (ref 60–90)
SODIUM SERPL-SCNC: 132 MMOL/L — LOW (ref 135–145)
SODIUM SERPL-SCNC: 134 MMOL/L — LOW (ref 135–145)
SODIUM SERPL-SCNC: 134 MMOL/L — LOW (ref 135–145)
WBC # BLD: 5.3 K/UL — SIGNIFICANT CHANGE UP (ref 3.8–10.5)
WBC # BLD: 5.6 K/UL — SIGNIFICANT CHANGE UP (ref 3.8–10.5)
WBC # BLD: 7.4 K/UL — SIGNIFICANT CHANGE UP (ref 3.8–10.5)
WBC # FLD AUTO: 5.3 K/UL — SIGNIFICANT CHANGE UP (ref 3.8–10.5)
WBC # FLD AUTO: 5.6 K/UL — SIGNIFICANT CHANGE UP (ref 3.8–10.5)
WBC # FLD AUTO: 7.4 K/UL — SIGNIFICANT CHANGE UP (ref 3.8–10.5)

## 2017-06-10 PROCEDURE — 93010 ELECTROCARDIOGRAM REPORT: CPT

## 2017-06-10 PROCEDURE — 99233 SBSQ HOSP IP/OBS HIGH 50: CPT

## 2017-06-10 PROCEDURE — 33990 INSJ PERQ VAD L HRT ARTERIAL: CPT

## 2017-06-10 PROCEDURE — 93308 TTE F-UP OR LMTD: CPT | Mod: 26

## 2017-06-10 PROCEDURE — 93321 DOPPLER ECHO F-UP/LMTD STD: CPT | Mod: 26

## 2017-06-10 RX ORDER — POTASSIUM CHLORIDE 20 MEQ
20 PACKET (EA) ORAL ONCE
Qty: 0 | Refills: 0 | Status: COMPLETED | OUTPATIENT
Start: 2017-06-10 | End: 2017-06-10

## 2017-06-10 RX ORDER — ONDANSETRON 8 MG/1
4 TABLET, FILM COATED ORAL ONCE
Qty: 0 | Refills: 0 | Status: COMPLETED | OUTPATIENT
Start: 2017-06-10 | End: 2017-06-10

## 2017-06-10 RX ORDER — POTASSIUM CHLORIDE 20 MEQ
20 PACKET (EA) ORAL
Qty: 0 | Refills: 0 | Status: COMPLETED | OUTPATIENT
Start: 2017-06-10 | End: 2017-06-10

## 2017-06-10 RX ORDER — PHYTONADIONE (VIT K1) 5 MG
5 TABLET ORAL ONCE
Qty: 0 | Refills: 0 | Status: COMPLETED | OUTPATIENT
Start: 2017-06-10 | End: 2017-06-10

## 2017-06-10 RX ORDER — PHYTONADIONE (VIT K1) 5 MG
10 TABLET ORAL DAILY
Qty: 0 | Refills: 0 | Status: DISCONTINUED | OUTPATIENT
Start: 2017-06-10 | End: 2017-06-12

## 2017-06-10 RX ORDER — ACETAMINOPHEN 500 MG
650 TABLET ORAL ONCE
Qty: 0 | Refills: 0 | Status: COMPLETED | OUTPATIENT
Start: 2017-06-10 | End: 2017-06-10

## 2017-06-10 RX ORDER — HEPARIN SODIUM 5000 [USP'U]/ML
135 INJECTION INTRAVENOUS; SUBCUTANEOUS
Qty: 25000 | Refills: 0 | Status: DISCONTINUED | OUTPATIENT
Start: 2017-06-10 | End: 2017-06-10

## 2017-06-10 RX ADMIN — Medication 500 MILLIGRAM(S): at 15:10

## 2017-06-10 RX ADMIN — Medication 5 MILLIGRAM(S): at 21:32

## 2017-06-10 RX ADMIN — Medication 650 MILLIGRAM(S): at 17:33

## 2017-06-10 RX ADMIN — Medication 650 MILLIGRAM(S): at 16:43

## 2017-06-10 RX ADMIN — Medication 100 MILLIEQUIVALENT(S): at 22:14

## 2017-06-10 RX ADMIN — Medication 5 MILLIGRAM(S): at 05:59

## 2017-06-10 RX ADMIN — Medication 50 MILLIEQUIVALENT(S): at 06:00

## 2017-06-10 RX ADMIN — Medication 100 MILLIGRAM(S): at 21:32

## 2017-06-10 RX ADMIN — Medication 100 MILLIGRAM(S): at 06:00

## 2017-06-10 RX ADMIN — Medication 50 MILLIEQUIVALENT(S): at 09:55

## 2017-06-10 RX ADMIN — SENNA PLUS 2 TABLET(S): 8.6 TABLET ORAL at 21:32

## 2017-06-10 RX ADMIN — Medication 1 TABLET(S): at 15:09

## 2017-06-10 RX ADMIN — MEXILETINE HYDROCHLORIDE 150 MILLIGRAM(S): 150 CAPSULE ORAL at 15:08

## 2017-06-10 RX ADMIN — Medication 10 MG/HR: at 21:31

## 2017-06-10 RX ADMIN — Medication 100 MILLIEQUIVALENT(S): at 23:21

## 2017-06-10 RX ADMIN — MEXILETINE HYDROCHLORIDE 150 MILLIGRAM(S): 150 CAPSULE ORAL at 21:32

## 2017-06-10 RX ADMIN — Medication 1 TABLET(S): at 15:10

## 2017-06-10 RX ADMIN — ONDANSETRON 4 MILLIGRAM(S): 8 TABLET, FILM COATED ORAL at 18:31

## 2017-06-10 RX ADMIN — MILRINONE LACTATE 13.5 MICROGRAM(S)/KG/MIN: 1 INJECTION, SOLUTION INTRAVENOUS at 21:32

## 2017-06-10 RX ADMIN — Medication 101 MILLIGRAM(S): at 16:43

## 2017-06-10 RX ADMIN — Medication 100 MILLIGRAM(S): at 15:08

## 2017-06-10 RX ADMIN — Medication 1 MILLIGRAM(S): at 15:09

## 2017-06-10 RX ADMIN — MEXILETINE HYDROCHLORIDE 150 MILLIGRAM(S): 150 CAPSULE ORAL at 06:00

## 2017-06-10 NOTE — CHART NOTE - NSCHARTNOTEFT_GEN_A_CORE
====================  CCU MIDNIGHT ROUNDS  ====================    BILLY RUSSELL  [id number]  Patient is a 67y old  Male who presents with a chief complaint of Transfer from [facility] for management of ADHF/ LVAD eval (03 Jun 2017 00:32)      ====================  SUMMARY:  ====================    67 year old man history of advanced non-ischemic cardiomyopathy , bi-ventricular HFreF(EF15% 5/2017) s/p AICD on home milrinone 3mcg/kg/min, PAF on AC, VT, recent hospitalization  5/2017 for AICD firing with admission to the CCU presents with dizziness, N/V x 1 day after exertion a/w ADHF. Tooth infection found 6/9    ====================  NEW EVENTS:  ====================    Patient went for dental evaluation, which found pt has a chronic tooth infection. Pending recommendations. Heart Failure team rounded on pt today and was concerned about poor cardiac output. Based on discussions, increased milrinone to 0.6 mcg/kg/min and increased lasix from 15 to 20. Most likely plan for impella tomorrow. s/p [employer] [place] today. Pulled back 3cm this evening. H&H went down slightly without clear source of bleeding. Goal INR for impella is 1.8. Pt still slightly higher early in the night (2.1). Plan to check again overnight. OLIVIA CO is 3.9, CI 2.1. On new lasix pt is putting out 200-250 every few hours. Creatinine is going down.     ====================  VITALS (Last 12 hrs):  ====================    T(C): 36, Max: 36 (06-09 @ 20:00)  T(F): 96.8, Max: 96.8 (06-09 @ 20:00)  HR: 106 (100 - 106)  BP: 102/84 (82/70 - 102/84)  BP(mean): 96 (72 - 96)  ABP: --  ABP(mean): --  RR: 17 (13 - 36)  SpO2: 95% (91% - 95%)  Wt(kg): --  CVP(mm Hg): 24 (15 - 24)  CVP(cm H2O): --  CO: 3.9 (3.1 - 3.9)  CI: 2.1 (1.6 - 2.1)  PA: 65/34 (42/34 - 72/41)  PA(mean): 47 (38 - 60)  PCWP: --  SVR: 1393 (1332 - 1443)  PVR: 266 (266 - 266)    I&O's Summary  I & Os for 24h ending 09 Jun 2017 07:00  =============================================  IN: 1142.2 ml / OUT: 1750 ml / NET: -607.8 ml    I & Os for current day (as of 10 Kennedy 2017 00:29)  =============================================  IN: 846.6 ml / OUT: 1175 ml / NET: -328.4 ml      ====================  NEW LABS:  ====================                          7.9    4.8   )-----------( 369      ( 09 Jun 2017 22:30 )             24.6     06-09    131<L>  |  94<L>  |  48<H>  ----------------------------<  92  4.1   |  17<L>  |  1.71<H>    Ca    9.1      09 Jun 2017 22:30  Phos  4.0     06-09  Mg     2.6     06-09    TPro  6.9  /  Alb  3.5  /  TBili  2.0<H>  /  DBili  x   /  AST  213<H>  /  ALT  146<H>  /  AlkPhos  100  06-09    PT/INR - ( 09 Jun 2017 22:30 )   PT: 23.9 sec;   INR: 2.18 ratio         PTT - ( 09 Jun 2017 22:30 )  PTT:34.7 sec      Blood Gas Profile - Mixed (06.09.17 @ 22:42)    pH, Mixed: 7.44    pCO2, Mixed: 37 mmHg    pO2, Mixed: 24 mmHg    HCO3, Mixed: 24 mmol/L    Base Excess Mixed: 0.7 mmoL/L    Oxygen Saturation, Mixed: 32 %    Total CO2, Mixed: 25 mmol/L    FIO2, Mixed: 21    Blood Gas Source, Mixed: Mixed Blood Gas        ====================  PLAN:  ====================  - Monitor sCR  - Monitor mixed venous O2sat  - Monitor H&H  - Monitor UOP  - Monitor INR  - Plan for impella tomorrow       Aubrey Enciso MD  PGY-1  Internal Medicine-Emergency Medicine Resident  Pager: [phone]

## 2017-06-10 NOTE — PROGRESS NOTE ADULT - PROBLEM SELECTOR PLAN 5
Downtrending. Likely in setting of hepatic congestion due to ADHF.   Hepatitis panel negative. Abdominal U/S  negative for cirrhosis.    Trend LFTS.

## 2017-06-10 NOTE — CHART NOTE - NSCHARTNOTEFT_GEN_A_CORE
====================  CCU MIDNIGHT ROUNDS  ====================    BILLY [place]  [id number]  Patient is a 67y old Male who presents with a chief complaint of Transfer from [facility] for management of ADHF/ LVAD eval (03 Jun 2017 00:32)      ====================  SUMMARY:  ====================        ====================  NEW EVENTS:  ====================        ====================  VITALS (Last 12 hrs):  ====================    T(C): 36.4, Max: 36.4 (06-10 @ 20:00)  T(F): 97.5, Max: 97.5 (06-10 @ 20:00)  HR: 96 (84 - 106)  BP: 95/81 (87/67 - 105/58)  BP(mean): 91 (77 - 91)  ABP: 122/81 (92/71 - 126/86)  ABP(mean): 95 (95 - 95)  RR: 12 (11 - 23)  SpO2: 94% (90% - 95%)  Wt(kg): --  CVP(mm Hg): 17 (11 - 29)  CVP(cm H2O): --  CO: 7.4 (4.1 - 7.4)  CI: 3.9 (2.1 - 3.9)  PA: 66/34 (56/25 - 72/39)  PA(mean): 45 (36 - 50)  PCWP: 21 (21 - 21)  SVR: 1295 (1295 - 1295)  PVR: 144 (144 - 144)    I&O's Summary  I & Os for 24h ending 10 Kennedy 2017 07:00  =============================================  IN: 1157.6 ml / OUT: 1625 ml / NET: -467.4 ml    I & Os for current day (as of 10 Kennedy 2017 22:46)  =============================================  IN: 1098 ml / OUT: 1730 ml / NET: -632 ml          ====================  NEW LABS:  ====================                          8.8    7.4   )-----------( 283      ( 10 Kennedy 2017 21:30 )             28.1     06-10    134<L>  |  96  |  41<H>  ----------------------------<  91  3.7   |  23  |  1.58<H>    Ca    8.5      10 Kennedy 2017 21:30  Phos  3.4     06-10  Mg     2.4     06-10    TPro  6.8  /  Alb  3.2<L>  /  TBili  5.5<H>  /  DBili  x   /  AST  402<H>  /  ALT  139<H>  /  AlkPhos  82  06-10    PT/INR - ( 10 Kennedy 2017 19:19 )   PT: 20.0 sec;   INR: 1.81 ratio         PTT - ( 10 Kennedy 2017 19:19 )  PTT:55.1 sec    Blood Gas Profile - Mixed (06.10.17 @ 21:16)    pH, Mixed: 7.43    pCO2, Mixed: 40 mmHg    pO2, Mixed: 38 mmHg    HCO3, Mixed: 26 mmol/L    Base Excess Mixed: 2.3 mmoL/L    Oxygen Saturation, Mixed: 66 %    Total CO2, Mixed: 27 mmol/L    FIO2, Mixed: 21    Blood Gas Source, Mixed: Mixed Blood Gas        ====================  PLAN:  ====================  -       Aubrey Enciso MD  PGY-1  Internal Medicine-Emergency Medicine Resident  Pager: [phone] ====================  CCU MIDNIGHT ROUNDS  ====================    BILLY MUNOZP  [id number]  Patient is a 67y old Male who presents with a chief complaint of Transfer from [facility] for management of ADHF/ LVAD eval (03 Jun 2017 00:32)      ====================  SUMMARY:  ====================    67 year old man history of advanced non-ischemic cardiomyopathy , bi-ventricular HFreF(EF15% 5/2017) s/p AICD on home milrinone 3mcg/kg/min, PAF on AC, VT, recent hospitalization  5/2017 for AICD firing with admission to the CCU presents with dizziness, N/V x 1 day after exertion a/w ADHF. Tooth infection found 6/9.    ====================  NEW EVENTS:  ====================    Received Impella today. Also now has diaz catheter. Diaz inserted when PTT >200, with small hematuria after placement. Increased vitamin K. Still at milrinone 0.6 and lasix 20. With Impella OLIVIA CO and CI significantly improved (7.4 and 3.92 respectively). Per Heart Failure team plan for LVAD on Monday. INR now close to goal. If needed can give Kcentra prior to LVAD. Serum    ====================  VITALS (Last 12 hrs):  ====================    T(C): 36.4, Max: 36.4 (06-10 @ 20:00)  T(F): 97.5, Max: 97.5 (06-10 @ 20:00)  HR: 96 (84 - 106)  BP: 95/81 (87/67 - 105/58)  BP(mean): 91 (77 - 91)  ABP: 122/81 (92/71 - 126/86)  ABP(mean): 95 (95 - 95)  RR: 12 (11 - 23)  SpO2: 94% (90% - 95%)  Wt(kg): --  CVP(mm Hg): 17 (11 - 29)  CVP(cm H2O): --  CO: 7.4 (4.1 - 7.4)  CI: 3.9 (2.1 - 3.9)  PA: 66/34 (56/25 - 72/39)  PA(mean): 45 (36 - 50)  PCWP: 21 (21 - 21)  SVR: 1295 (1295 - 1295)  PVR: 144 (144 - 144)    I&O's Summary  I & Os for 24h ending 10 Kennedy 2017 07:00  =============================================  IN: 1157.6 ml / OUT: 1625 ml / NET: -467.4 ml    I & Os for current day (as of 10 Kennedy 2017 22:46)  =============================================  IN: 1098 ml / OUT: 1730 ml / NET: -632 ml          ====================  NEW LABS:  ====================                          8.8    7.4   )-----------( 283      ( 10 Kennedy 2017 21:30 )             28.1     06-10    134<L>  |  96  |  41<H>  ----------------------------<  91  3.7   |  23  |  1.58<H>    Ca    8.5      10 Kennedy 2017 21:30  Phos  3.4     06-10  Mg     2.4     06-10    TPro  6.8  /  Alb  3.2<L>  /  TBili  5.5<H>  /  DBili  x   /  AST  402<H>  /  ALT  139<H>  /  AlkPhos  82  06-10    PT/INR - ( 10 Kennedy 2017 19:19 )   PT: 20.0 sec;   INR: 1.81 ratio         PTT - ( 10 Kennedy 2017 19:19 )  PTT:55.1 sec    Blood Gas Profile - Mixed (06.10.17 @ 21:16)    pH, Mixed: 7.43    pCO2, Mixed: 40 mmHg    pO2, Mixed: 38 mmHg    HCO3, Mixed: 26 mmol/L    Base Excess Mixed: 2.3 mmoL/L    Oxygen Saturation, Mixed: 66 %    Total CO2, Mixed: 27 mmol/L    FIO2, Mixed: 21    Blood Gas Source, Mixed: Mixed Blood Gas        ====================  PLAN:  ====================  -       Aubrey Enciso MD  PGY-1  Internal Medicine-Emergency Medicine Resident  Pager: [phone] ====================  CCU MIDNIGHT ROUNDS  ====================    BILLY MUNOZP  [id number]  Patient is a 67y old Male who presents with a chief complaint of Transfer from [facility] for management of ADHF/ LVAD eval (03 Jun 2017 00:32)      ====================  SUMMARY:  ====================    67 year old man history of advanced non-ischemic cardiomyopathy , bi-ventricular HFreF(EF15% 5/2017) s/p AICD on home milrinone 3mcg/kg/min, PAF on AC, VT, recent hospitalization  5/2017 for AICD firing with admission to the CCU presents with dizziness, N/V x 1 day after exertion a/w ADHF. Tooth infection found 6/9.    ====================  NEW EVENTS:  ====================    Received Impella today. Also now has diaz catheter. Diaz inserted when PTT >200, with small hematuria after placement. Increased vitamin K. Still at milrinone 0.6 and lasix 20. With Impella OLIVIA CO and CI significantly improved (7.4 and 3.92 respectively). Per Heart Failure team plan for LVAD on Monday. INR now close to goal. If needed can give Kcentra prior to LVAD. Serum creatinine improving with impella.    ====================  VITALS (Last 12 hrs):  ====================    T(C): 36.4, Max: 36.4 (06-10 @ 20:00)  T(F): 97.5, Max: 97.5 (06-10 @ 20:00)  HR: 96 (84 - 106)  BP: 95/81 (87/67 - 105/58)  BP(mean): 91 (77 - 91)  ABP: 122/81 (92/71 - 126/86)  ABP(mean): 95 (95 - 95)  RR: 12 (11 - 23)  SpO2: 94% (90% - 95%)  Wt(kg): --  CVP(mm Hg): 17 (11 - 29)  CVP(cm H2O): --  CO: 7.4 (4.1 - 7.4)  CI: 3.9 (2.1 - 3.9)  PA: 66/34 (56/25 - 72/39)  PA(mean): 45 (36 - 50)  PCWP: 21 (21 - 21)  SVR: 1295 (1295 - 1295)  PVR: 144 (144 - 144)    I&O's Summary  I & Os for 24h ending 10 Kennedy 2017 07:00  =============================================  IN: 1157.6 ml / OUT: 1625 ml / NET: -467.4 ml    I & Os for current day (as of 10 Kennedy 2017 22:46)  =============================================  IN: 1098 ml / OUT: 1730 ml / NET: -632 ml          ====================  NEW LABS:  ====================                          8.8    7.4   )-----------( 283      ( 10 Kennedy 2017 21:30 )             28.1     06-10    134<L>  |  96  |  41<H>  ----------------------------<  91  3.7   |  23  |  1.58<H>    Ca    8.5      10 Kennedy 2017 21:30  Phos  3.4     06-10  Mg     2.4     06-10    TPro  6.8  /  Alb  3.2<L>  /  TBili  5.5<H>  /  DBili  x   /  AST  402<H>  /  ALT  139<H>  /  AlkPhos  82  06-10    PT/INR - ( 10 Kennedy 2017 19:19 )   PT: 20.0 sec;   INR: 1.81 ratio         PTT - ( 10 Kennedy 2017 19:19 )  PTT:55.1 sec    Blood Gas Profile - Mixed (06.10.17 @ 21:16)    pH, Mixed: 7.43    pCO2, Mixed: 40 mmHg    pO2, Mixed: 38 mmHg    HCO3, Mixed: 26 mmol/L    Base Excess Mixed: 2.3 mmoL/L    Oxygen Saturation, Mixed: 66 %    Total CO2, Mixed: 27 mmol/L    FIO2, Mixed: 21    Blood Gas Source, Mixed: Mixed Blood Gas        ====================  PLAN:  ====================  - Monitor I&O  - Monitor CO/CI  - Attempt to wean off milrinone in setting of impella  - Trend INR  - LVAD Monday      Aubrey Enciso MD  PGY-1  Internal Medicine-Emergency Medicine Resident  Pager: [phone]

## 2017-06-10 NOTE — PROGRESS NOTE ADULT - PROBLEM SELECTOR PLAN 3
Improved.    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 Follow up  Heme recommendations.   Heme recommendations appreciated: Continue Vitamin K 5mg PO QD trial.  Follow up F7 Assay.

## 2017-06-10 NOTE — PROGRESS NOTE ADULT - ATTENDING COMMENTS
S/p Impella implantation.   Current hemos:  CVP 20 PA 60/30 PCWP 21 PA sat 36 (pre) to 60 post   Milrione 0.6, Lasix 20. Off all oral meds other than mexilitene.   I/O -400  Cr 1.5 (1.7) WBC 4.8 INR 2.1 (no change)   CXR unremarkable   static  Plan:   Continue Milrinone/Diuretics at current levels. If urine output does not improve, consider addition of metolazone 2.5  Rosas, A-Line. May need low dose Vaso to bring MAP above 65.  Hematology: to advise expeditious correction of INR, without impairing ability to heparinize for surgery.  systemic heparin when INR below 2.  Likely LVAD monday.  Marvin Campbell

## 2017-06-10 NOTE — PROGRESS NOTE ADULT - PROBLEM SELECTOR PLAN 2
Improving. Likely cardiorenal in setting of ADHF +/- postobstructive etiology.   Continue Milrinone and Lasix gtt.  Continue daily Bladders Scans.  Avoid nephrotoxins.  Strict I/Os.    Follow up Nephrology recommendations.

## 2017-06-10 NOTE — PROGRESS NOTE ADULT - ASSESSMENT
67 year old man history of advanced non-ischemic cardiomyopathy NHYA IV/AHA-D, bi-ventricular HFreF(EF15% 5/2017) s/p AICD on home milrinone 3mcg/kg/min, PAF on AC, VT, recent hospitalization 5/2017 for AICD firing with admission to the CCU presents with dizziness, N/V x 1 day after exertion a/w acute on chronic decompensated HF with CCU transfer for pressure support diuresis. Hospital course c/b NORMAN likely 2/2 cardiorenal +/- postobstructive etiology, supra-therapeutic INR. s/p [place] Cassidy yesterday w/o complications.

## 2017-06-10 NOTE — PROGRESS NOTE ADULT - PROBLEM SELECTOR PLAN 1
Patient with severe global LVSD with EF 15% 5/2017 on palliative Milrinone.  s/p Gibran Benjamin yesterday. CO 3.5 OLIVIA 1.85 this AM.   Increased Lasix gtt 20 and Milrinone to 0.6.  Plan for Impella today pending INR less than 1.8.   Patient net -467.4. Denies CP or SOB.    BMP q6hrs- replete electrolytes PRN.   Continue LVAD evaluation. Follow up Heme recommendations re: anemia.  Will consult Dental today.   Follow up Heart Failure recommendations.   Strict I/Os. Daily Weights.

## 2017-06-10 NOTE — PROGRESS NOTE ADULT - SUBJECTIVE AND OBJECTIVE BOX
Cardiology fellow weekend coverage: Jon Zurita ([phone]), after hours please call [phone]     Interval history: remains on milrinone gtt at 0.6 and IV lasix gtt at 20mg/hr, metoprolol discontinued this AM    ROS- denies pain or shortness of breath    Tele: sinus 110s, PVCs      Medications:  mexiletine 150milliGRAM(s) Oral three times a day  docusate sodium 100milliGRAM(s) Oral three times a day  senna 2Tablet(s) Oral at bedtime  multivitamin/minerals 1Tablet(s) Oral daily  calcium carbonate  625 mG + Vitamin D (OsCal 250 + D) 1Tablet(s) Oral daily  folic acid 1milliGRAM(s) Oral daily  ascorbic acid 500milliGRAM(s) Oral daily  bisacodyl 5milliGRAM(s) Oral every 12 hours PRN  furosemide Infusion 20mG/Hr IV Continuous <Continuous>  milrinone Infusion 0.6MICROgram(s)/kG/Min IV Continuous <Continuous>  aluminum hydroxide/magnesium hydroxide/simethicone Suspension 30milliLiter(s) Oral every 6 hours PRN  phytonadione   Solution 5milliGRAM(s) Oral daily    Vitals:  T(C): 36, Max: 36 (06-09 @ 20:00)  HR: 106 (100 - 110)  BP: 88/74 (80/54 - 102/84)  BP(mean): 80 (63 - 96)  ABP: --  ABP(mean): --  RR: 16 (11 - 36)  SpO2: 92% (91% - 98%)  Wt(kg): --  CVP(cm H2O): --  CO: 3.7 (3.1 - 3.9)  CI: 1.9 (1.6 - 2.1)  PA: 61/32 (42/34 - 72/41)  PA(mean): 44 (36 - 60)  PCWP: --  SVR: 1188 (1188 - 1443)  PVR: 173 (173 - 266)    Daily     Daily Weight in k.8 (10 Kennedy 2017 01:00)    I&O's Summary  I & Os for 24h ending 10 Kennedy 2017 07:00  =============================================  IN: 1157.6 ml / OUT: 1625 ml / NET: -467.4 ml    I & Os for current day (as of 10 Kennedy 2017 11:10)  =============================================  IN: 244 ml / OUT: 450 ml / NET: -206 ml      Physical Exam:  Appearance: [ ] Normal [ ] NAD  Eyes: [ ] PERRL [ ] EOMI  HENT: [ ] Normal oral [place] [ ]NC/AT  Cardiovascular: [ ] S1 [ ] S2 [ ] RRR [ ] No m/r/g [ ]No edema [ ] JVP, R- [place]-Cassidy in place  Procedural Access Site: [ ] No hematoma [ ] Non-tender to palpation [ ] 2+ pulse [ ] No bruit [ ] No Ecchymosis  Respiratory: [X] Clear to auscultation bilaterally  Gastrointestinal: [ ] Soft [ ] Non-tender [ ] Non-distended [ ] BS+  Musculoskeletal: [ ] No clubbing [ ] No joint deformity, extremity warm  Neurologic: [ ] Non-focal, [place] alert with                          9.3    5.3   )-----------( 303      ( 10 Kennedy 2017 04:28 )             30.4     06-10    134<L>  |  95<L>  |  47<H>  ----------------------------<  97  3.5   |  21<L>  |  1.58<H>    Ca    8.7      10 Kennedy 2017 04:28  Phos  3.9     06-10  Mg     2.4     06-10    TPro  6.6  /  Alb  3.4  /  TBili  2.0<H>  /  DBili  x   /  AST  199<H>  /  ALT  139<H>  /  AlkPhos  95  06-10    PT/INR - ( 10 Kennedy 2017 04:28 )   PT: 23.8 sec;   INR: 2.17 ratio         PTT - ( 10 Kennedy 2017 04:28 )  PTT:36.2 sec        Oxygen Saturation, Mixed: 36 % (06-10 @ 04:24)  Oxygen Saturation, Mixed: 32 % ( @ 22:42)  Oxygen Saturation, Mixed: 88 % ( @ 21:56)  Oxygen Saturation, Mixed: 36 % ( @ 17:11)    Lactate Dehydrogenase, Serum: 353 U/L ( @ 06:26)  Lactate Dehydrogenase, Serum: 307 U/L ( @ 03:10)    Lactate, Blood: 1.7 mmol/L ( @ 06:27)  Lactate, Blood: 2.2 mmol/L ( 17:33)        -----------------------------------------------------------------  [ ] Congestive Heart Failure                  [ ] Acute                                                [ ] Acute on Chronic     [ ] Chronic  [ ] Diastolic (HFpEF)  [ ] Systolic (HFrEF)  [ ] Combined Systolic & Diastolic      [ ] ACC/AHA Stage: _____     [ ] NYHA Class: _____  -----------------------------------------------------------------  [ ] NORMAN                                                    [ ] CKD I  [ ] ATN                                                  [ ] CKD II  [ ] Other: _____                                  [ ] CKD III                                                                [ ] CKD IV                                                                [ ] CKD V                                                                [ ] ESRD  -----------------------------------------------------------------  Abnormal Nutritional Status:  [ ] Malnutrtion (see nutrition note)  [ ] Underweight: BMI < 19  [ ] Morbid Obeisty: BMI >/ 40  [ ] Cachexia  [ ] Other

## 2017-06-10 NOTE — PROGRESS NOTE ADULT - SUBJECTIVE AND OBJECTIVE BOX
Events 	    MEDICATIONS  (STANDING):  mexiletine 150milliGRAM(s) Oral three times a day  docusate sodium 100milliGRAM(s) Oral three times a day  senna 2Tablet(s) Oral at bedtime  multivitamin/minerals 1Tablet(s) Oral daily  calcium carbonate  625 mG + Vitamin D (OsCal 250 + D) 1Tablet(s) Oral daily  folic acid 1milliGRAM(s) Oral daily  ascorbic acid 500milliGRAM(s) Oral daily  furosemide Infusion 20mG/Hr IV Continuous <Continuous>  milrinone Infusion 0.6MICROgram(s)/kG/Min IV Continuous <Continuous>  phytonadione   Solution 5milliGRAM(s) Oral daily  potassium chloride  20 mEq/100 mL IVPB 20milliEquivalent(s) IV Intermittent every 2 hours    MEDICATIONS  (PRN):  bisacodyl 5milliGRAM(s) Oral every 12 hours PRN Constipation  aluminum hydroxide/magnesium hydroxide/simethicone Suspension 30milliLiter(s) Oral every 6 hours PRN Dyspepsia      REVIEW OF SYSTEMS:  Otherwise, 10 point ROS done and otherwise negative.      Vital Signs Last 24 Hrs  T(C): 36, Max: 36.6 (06-09 @ 10:00)  T(F): 96.8, Max: 97.9 (06-09 @ 10:00)  HR: 106 (96 - 110)  BP: 86/70 (82/62 - 102/84)  BP(mean): 78 (62 - 96)  RR: 14 (11 - 36)  SpO2: 94% (91% - 98%)  I&O's Summary  I & Os for 24h ending 10 Kennedy 2017 07:00  =============================================  IN: 1157.6 ml / OUT: 1625 ml / NET: -467.4 ml    I & Os for current day (as of 10 Kennedy 2017 08:21)  =============================================  IN: 23.5 ml / OUT: 225 ml / NET: -201.5 ml    CAPILLARY BLOOD GLUCOSE      PHYSICAL EXAM:  Appearance: Normal	  HEENT:   Normal oral mucosa, PERRL, EOMI	  Lymphatic: No lymphadenopathy  Cardiovascular: Normal S1 S2, No JVD, No murmurs, No edema  Respiratory: Lungs clear to auscultation	  Psychiatry: A & O x 3, Mood & affect appropriate  Gastrointestinal:  Soft, Non-tender, + BS	  Skin: No rashes, No ecchymoses, No cyanosis	  Neurologic: Non-focal  Extremities: Normal range of motion, No clubbing, cyanosis or edema  Vascular: Peripheral pulses palpable 2+ bilaterally    LABS:                        9.3    5.3   )-----------( 303      ( 10 Kennedy 2017 04:28 )             30.4                         7.9    4.8   )-----------( 369      ( 09 Jun 2017 22:30 )             24.6     06-10    134<L>  |  95<L>  |  47<H>  ----------------------------<  97  3.5   |  21<L>  |  1.58<H>  06-09    131<L>  |  94<L>  |  48<H>  ----------------------------<  92  4.1   |  17<L>  |  1.71<H>    Ca    8.7      10 Kennedy 2017 04:28  Ca    9.1      09 Jun 2017 22:30  Phos  3.9     06-10  Mg     2.4     06-10    TPro  6.6  /  Alb  3.4  /  TBili  2.0<H>  /  DBili  x   /  AST  199<H>  /  ALT  139<H>  /  AlkPhos  95  06-10  TPro  6.9  /  Alb  3.5  /  TBili  2.0<H>  /  DBili  x   /  AST  213<H>  /  ALT  146<H>  /  AlkPhos  100  06-09  	 	    PT/INR - ( 10 Kennedy 2017 04:28 )   PT: 23.8 sec;   INR: 2.17 ratio         PTT - ( 10 Kennedy 2017 04:28 )  PTT:36.2 sec      TELEMETRY: 	    ECG:  	  RADIOLOGY:  OTHER: 	    PREVIOUS DIAGNOSTIC TESTING:    [ ] Echocardiogram: reviewed

## 2017-06-10 NOTE — PROGRESS NOTE ADULT - ATTENDING COMMENTS
Mr Baez is resting comfortably after returning from the cath lab, where an Impella CP was placed for continued diminished CO/CI/MVO2 despite escalating pharmacotherapies (Milrinone and Lasix drips).  He currently reports no angina, dyspnea, or pain.  The Impella site is stable and without evidence of bleeding despite an INR>2.  Current estimated support is approximately 3.2 L/min on P8.  Impella parameters are stable and optimized; a bedside TTE will be done to confirm appropriate placement.  Milrinone and Lasix infusions will be maintained.  Hepatic and renal functions are improving.  The case was reviewed in detail with the CHF team (Dr Campbell) and the patient is tentatively planned for an LVAD on Monday.  All of the patient and his family members' (godbrother) questions were answered in detail.  Ricky Johns MD

## 2017-06-10 NOTE — PROGRESS NOTE ADULT - ASSESSMENT
67M Htn, pAfib s/p albation, NICM/dilated cardiomypathy (LVID 8cm) on home milrinone, SJ-AICD p/w dizziness and NORMAN, ADHF in low output state.    -MVO2 still low on milrinone gtt at 0.6mcg, would need mechanical support device   -clinically euvolemic, keep O>I with IV Lasix gtt, monitor Cr. 67M Htn, pAfib s/p albation, NICM/dilated cardiomypathy (LVID 8cm) on home milrinone, SJ-AICD p/w dizziness and NORMAN, ADHF in low output state.    -MVO2 still low on milrinone gtt at 0.6mcg, would need mechanical support device   -clinically euvolemic, keep O>I with IV Lasix gtt, monitor Cr.   -off metoprolol, cont' mexiletine.

## 2017-06-11 LAB
ALBUMIN SERPL ELPH-MCNC: 3.1 G/DL — LOW (ref 3.3–5)
ALBUMIN SERPL ELPH-MCNC: 3.2 G/DL — LOW (ref 3.3–5)
ALBUMIN SERPL ELPH-MCNC: 3.2 G/DL — LOW (ref 3.3–5)
ALP SERPL-CCNC: 87 U/L — SIGNIFICANT CHANGE UP (ref 40–120)
ALP SERPL-CCNC: 95 U/L — SIGNIFICANT CHANGE UP (ref 40–120)
ALP SERPL-CCNC: 96 U/L — SIGNIFICANT CHANGE UP (ref 40–120)
ALT FLD-CCNC: 114 U/L RC — HIGH (ref 10–45)
ALT FLD-CCNC: 123 U/L RC — HIGH (ref 10–45)
ALT FLD-CCNC: 136 U/L RC — HIGH (ref 10–45)
ANION GAP SERPL CALC-SCNC: 12 MMOL/L — SIGNIFICANT CHANGE UP (ref 5–17)
ANION GAP SERPL CALC-SCNC: 14 MMOL/L — SIGNIFICANT CHANGE UP (ref 5–17)
ANION GAP SERPL CALC-SCNC: 15 MMOL/L — SIGNIFICANT CHANGE UP (ref 5–17)
APTT BLD: 47.4 SEC — HIGH (ref 27.5–37.4)
APTT BLD: 53.4 SEC — HIGH (ref 27.5–37.4)
APTT BLD: 63.5 SEC — HIGH (ref 27.5–37.4)
AST SERPL-CCNC: 391 U/L — HIGH (ref 10–40)
AST SERPL-CCNC: 410 U/L — HIGH (ref 10–40)
AST SERPL-CCNC: 422 U/L — HIGH (ref 10–40)
BASE EXCESS BLDMV CALC-SCNC: 2.8 MMOL/L — SIGNIFICANT CHANGE UP (ref -3–3)
BASE EXCESS BLDMV CALC-SCNC: 3 MMOL/L — SIGNIFICANT CHANGE UP (ref -3–3)
BASE EXCESS BLDMV CALC-SCNC: 3 MMOL/L — SIGNIFICANT CHANGE UP (ref -3–3)
BASE EXCESS BLDMV CALC-SCNC: 3.3 MMOL/L — HIGH (ref -3–3)
BASE EXCESS BLDMV CALC-SCNC: 4.1 MMOL/L — HIGH (ref -3–3)
BASE EXCESS BLDMV CALC-SCNC: 4.2 MMOL/L — HIGH (ref -3–3)
BILIRUB SERPL-MCNC: 6.4 MG/DL — HIGH (ref 0.2–1.2)
BILIRUB SERPL-MCNC: 6.4 MG/DL — HIGH (ref 0.2–1.2)
BILIRUB SERPL-MCNC: 6.7 MG/DL — HIGH (ref 0.2–1.2)
BLD GP AB SCN SERPL QL: NEGATIVE — SIGNIFICANT CHANGE UP
BUN SERPL-MCNC: 29 MG/DL — HIGH (ref 7–23)
BUN SERPL-MCNC: 32 MG/DL — HIGH (ref 7–23)
BUN SERPL-MCNC: 37 MG/DL — HIGH (ref 7–23)
CALCIUM SERPL-MCNC: 8.6 MG/DL — SIGNIFICANT CHANGE UP (ref 8.4–10.5)
CALCIUM SERPL-MCNC: 8.8 MG/DL — SIGNIFICANT CHANGE UP (ref 8.4–10.5)
CALCIUM SERPL-MCNC: 8.9 MG/DL — SIGNIFICANT CHANGE UP (ref 8.4–10.5)
CHLORIDE SERPL-SCNC: 97 MMOL/L — SIGNIFICANT CHANGE UP (ref 96–108)
CHLORIDE SERPL-SCNC: 99 MMOL/L — SIGNIFICANT CHANGE UP (ref 96–108)
CHLORIDE SERPL-SCNC: 99 MMOL/L — SIGNIFICANT CHANGE UP (ref 96–108)
CO2 BLDMV-SCNC: 28 MMOL/L — SIGNIFICANT CHANGE UP (ref 21–29)
CO2 BLDMV-SCNC: 29 MMOL/L — SIGNIFICANT CHANGE UP (ref 21–29)
CO2 SERPL-SCNC: 23 MMOL/L — SIGNIFICANT CHANGE UP (ref 22–31)
CO2 SERPL-SCNC: 24 MMOL/L — SIGNIFICANT CHANGE UP (ref 22–31)
CO2 SERPL-SCNC: 24 MMOL/L — SIGNIFICANT CHANGE UP (ref 22–31)
CREAT SERPL-MCNC: 1.37 MG/DL — HIGH (ref 0.5–1.3)
CREAT SERPL-MCNC: 1.4 MG/DL — HIGH (ref 0.5–1.3)
CREAT SERPL-MCNC: 1.42 MG/DL — HIGH (ref 0.5–1.3)
FIBRINOGEN PPP-MCNC: 262 MG/DL — LOW (ref 310–510)
FIBRINOGEN PPP-MCNC: 297 MG/DL — LOW (ref 310–510)
FIBRINOGEN PPP-MCNC: 313 MG/DL — SIGNIFICANT CHANGE UP (ref 310–510)
GAS PNL BLDMV: SIGNIFICANT CHANGE UP
GLUCOSE SERPL-MCNC: 102 MG/DL — HIGH (ref 70–99)
GLUCOSE SERPL-MCNC: 94 MG/DL — SIGNIFICANT CHANGE UP (ref 70–99)
GLUCOSE SERPL-MCNC: 95 MG/DL — SIGNIFICANT CHANGE UP (ref 70–99)
HAPTOGLOB SERPL-MCNC: 37 MG/DL — SIGNIFICANT CHANGE UP (ref 34–200)
HAPTOGLOB SERPL-MCNC: <20 MG/DL — LOW (ref 34–200)
HCO3 BLDMV-SCNC: 27 MMOL/L — SIGNIFICANT CHANGE UP (ref 20–28)
HCO3 BLDMV-SCNC: 28 MMOL/L — SIGNIFICANT CHANGE UP (ref 20–28)
HCO3 BLDMV-SCNC: 28 MMOL/L — SIGNIFICANT CHANGE UP (ref 20–28)
HCT VFR BLD CALC: 26.9 % — LOW (ref 39–50)
HCT VFR BLD CALC: 27.7 % — LOW (ref 39–50)
HCT VFR BLD CALC: 28 % — LOW (ref 39–50)
HCT VFR BLD CALC: 28.1 % — LOW (ref 39–50)
HCT VFR BLD CALC: 29 % — LOW (ref 39–50)
HGB BLD-MCNC: 8.4 G/DL — LOW (ref 13–17)
HGB BLD-MCNC: 8.8 G/DL — LOW (ref 13–17)
HGB BLD-MCNC: 8.8 G/DL — LOW (ref 13–17)
HGB BLD-MCNC: 8.9 G/DL — LOW (ref 13–17)
HGB BLD-MCNC: 9 G/DL — LOW (ref 13–17)
HOROWITZ INDEX BLDMV+IHG-RTO: 21 — SIGNIFICANT CHANGE UP
INR BLD: 1.69 RATIO — HIGH (ref 0.88–1.16)
LACTATE BLDV-MCNC: 1.2 MMOL/L — SIGNIFICANT CHANGE UP (ref 0.7–2)
LDH SERPL L TO P-CCNC: 1771 U/L — HIGH (ref 50–242)
LDH SERPL L TO P-CCNC: 1777 U/L — HIGH (ref 50–242)
LDH SERPL L TO P-CCNC: 1808 U/L — HIGH (ref 50–242)
LDH SERPL L TO P-CCNC: 1959 U/L — HIGH (ref 50–242)
LDH SERPL L TO P-CCNC: 2170 U/L — HIGH (ref 50–242)
MAGNESIUM SERPL-MCNC: 2.4 MG/DL — SIGNIFICANT CHANGE UP (ref 1.6–2.6)
MCHC RBC-ENTMCNC: 26.7 PG — LOW (ref 27–34)
MCHC RBC-ENTMCNC: 26.8 PG — LOW (ref 27–34)
MCHC RBC-ENTMCNC: 27 PG — SIGNIFICANT CHANGE UP (ref 27–34)
MCHC RBC-ENTMCNC: 27.1 PG — SIGNIFICANT CHANGE UP (ref 27–34)
MCHC RBC-ENTMCNC: 27.2 PG — SIGNIFICANT CHANGE UP (ref 27–34)
MCHC RBC-ENTMCNC: 31.1 GM/DL — LOW (ref 32–36)
MCHC RBC-ENTMCNC: 31.1 GM/DL — LOW (ref 32–36)
MCHC RBC-ENTMCNC: 31.5 GM/DL — LOW (ref 32–36)
MCHC RBC-ENTMCNC: 31.6 GM/DL — LOW (ref 32–36)
MCHC RBC-ENTMCNC: 31.7 GM/DL — LOW (ref 32–36)
MCV RBC AUTO: 85.8 FL — SIGNIFICANT CHANGE UP (ref 80–100)
MCV RBC AUTO: 85.8 FL — SIGNIFICANT CHANGE UP (ref 80–100)
MCV RBC AUTO: 86 FL — SIGNIFICANT CHANGE UP (ref 80–100)
O2 CT VFR BLD CALC: 29 MMHG — LOW (ref 30–65)
O2 CT VFR BLD CALC: 30 MMHG — SIGNIFICANT CHANGE UP (ref 30–65)
O2 CT VFR BLD CALC: 30 MMHG — SIGNIFICANT CHANGE UP (ref 30–65)
O2 CT VFR BLD CALC: 32 MMHG — SIGNIFICANT CHANGE UP (ref 30–65)
O2 CT VFR BLD CALC: 32 MMHG — SIGNIFICANT CHANGE UP (ref 30–65)
O2 CT VFR BLD CALC: 40 MMHG — SIGNIFICANT CHANGE UP (ref 30–65)
PCO2 BLDMV: 39 MMHG — SIGNIFICANT CHANGE UP (ref 30–65)
PCO2 BLDMV: 40 MMHG — SIGNIFICANT CHANGE UP (ref 30–65)
PCO2 BLDMV: 42 MMHG — SIGNIFICANT CHANGE UP (ref 30–65)
PCO2 BLDMV: 42 MMHG — SIGNIFICANT CHANGE UP (ref 30–65)
PH BLDMV: 7.43 — SIGNIFICANT CHANGE UP (ref 7.32–7.45)
PH BLDMV: 7.43 — SIGNIFICANT CHANGE UP (ref 7.32–7.45)
PH BLDMV: 7.44 — SIGNIFICANT CHANGE UP (ref 7.32–7.45)
PH BLDMV: 7.45 — SIGNIFICANT CHANGE UP (ref 7.32–7.45)
PH BLDMV: 7.46 — HIGH (ref 7.32–7.45)
PH BLDMV: 7.46 — HIGH (ref 7.32–7.45)
PHOSPHATE SERPL-MCNC: 3.3 MG/DL — SIGNIFICANT CHANGE UP (ref 2.5–4.5)
PLATELET # BLD AUTO: 221 K/UL — SIGNIFICANT CHANGE UP (ref 150–400)
PLATELET # BLD AUTO: 246 K/UL — SIGNIFICANT CHANGE UP (ref 150–400)
PLATELET # BLD AUTO: 253 K/UL — SIGNIFICANT CHANGE UP (ref 150–400)
PLATELET # BLD AUTO: 262 K/UL — SIGNIFICANT CHANGE UP (ref 150–400)
PLATELET # BLD AUTO: 269 K/UL — SIGNIFICANT CHANGE UP (ref 150–400)
POTASSIUM SERPL-MCNC: 3.8 MMOL/L — SIGNIFICANT CHANGE UP (ref 3.5–5.3)
POTASSIUM SERPL-MCNC: 3.9 MMOL/L — SIGNIFICANT CHANGE UP (ref 3.5–5.3)
POTASSIUM SERPL-MCNC: 4.2 MMOL/L — SIGNIFICANT CHANGE UP (ref 3.5–5.3)
POTASSIUM SERPL-SCNC: 3.8 MMOL/L — SIGNIFICANT CHANGE UP (ref 3.5–5.3)
POTASSIUM SERPL-SCNC: 3.9 MMOL/L — SIGNIFICANT CHANGE UP (ref 3.5–5.3)
POTASSIUM SERPL-SCNC: 4.2 MMOL/L — SIGNIFICANT CHANGE UP (ref 3.5–5.3)
PROT SERPL-MCNC: 6.5 G/DL — SIGNIFICANT CHANGE UP (ref 6–8.3)
PROT SERPL-MCNC: 6.7 G/DL — SIGNIFICANT CHANGE UP (ref 6–8.3)
PROT SERPL-MCNC: 6.8 G/DL — SIGNIFICANT CHANGE UP (ref 6–8.3)
PROTHROM AB SERPL-ACNC: 18.6 SEC — HIGH (ref 9.8–12.7)
RBC # BLD: 3.13 M/UL — LOW (ref 4.2–5.8)
RBC # BLD: 3.22 M/UL — LOW (ref 4.2–5.8)
RBC # BLD: 3.26 M/UL — LOW (ref 4.2–5.8)
RBC # BLD: 3.27 M/UL — LOW (ref 4.2–5.8)
RBC # BLD: 3.37 M/UL — LOW (ref 4.2–5.8)
RBC # FLD: 21.8 % — HIGH (ref 10.3–14.5)
RBC # FLD: 22 % — HIGH (ref 10.3–14.5)
RBC # FLD: 22 % — HIGH (ref 10.3–14.5)
RBC # FLD: 22.1 % — HIGH (ref 10.3–14.5)
RBC # FLD: 22.2 % — HIGH (ref 10.3–14.5)
RH IG SCN BLD-IMP: POSITIVE — SIGNIFICANT CHANGE UP
SAO2 % BLDMV: 48 % — LOW (ref 60–90)
SAO2 % BLDMV: 54 % — LOW (ref 60–90)
SAO2 % BLDMV: 54 % — LOW (ref 60–90)
SAO2 % BLDMV: 55 % — LOW (ref 60–90)
SAO2 % BLDMV: 55 % — LOW (ref 60–90)
SAO2 % BLDMV: 70 % — SIGNIFICANT CHANGE UP (ref 60–90)
SODIUM SERPL-SCNC: 135 MMOL/L — SIGNIFICANT CHANGE UP (ref 135–145)
SODIUM SERPL-SCNC: 136 MMOL/L — SIGNIFICANT CHANGE UP (ref 135–145)
SODIUM SERPL-SCNC: 136 MMOL/L — SIGNIFICANT CHANGE UP (ref 135–145)
WBC # BLD: 11.1 K/UL — HIGH (ref 3.8–10.5)
WBC # BLD: 7.5 K/UL — SIGNIFICANT CHANGE UP (ref 3.8–10.5)
WBC # BLD: 9.3 K/UL — SIGNIFICANT CHANGE UP (ref 3.8–10.5)
WBC # BLD: 9.4 K/UL — SIGNIFICANT CHANGE UP (ref 3.8–10.5)
WBC # BLD: 9.9 K/UL — SIGNIFICANT CHANGE UP (ref 3.8–10.5)
WBC # FLD AUTO: 11.1 K/UL — HIGH (ref 3.8–10.5)
WBC # FLD AUTO: 7.5 K/UL — SIGNIFICANT CHANGE UP (ref 3.8–10.5)
WBC # FLD AUTO: 9.3 K/UL — SIGNIFICANT CHANGE UP (ref 3.8–10.5)
WBC # FLD AUTO: 9.4 K/UL — SIGNIFICANT CHANGE UP (ref 3.8–10.5)
WBC # FLD AUTO: 9.9 K/UL — SIGNIFICANT CHANGE UP (ref 3.8–10.5)

## 2017-06-11 PROCEDURE — 99233 SBSQ HOSP IP/OBS HIGH 50: CPT

## 2017-06-11 PROCEDURE — 93010 ELECTROCARDIOGRAM REPORT: CPT | Mod: 77

## 2017-06-11 PROCEDURE — 71010: CPT | Mod: 26

## 2017-06-11 PROCEDURE — 93010 ELECTROCARDIOGRAM REPORT: CPT

## 2017-06-11 PROCEDURE — 93308 TTE F-UP OR LMTD: CPT | Mod: 26

## 2017-06-11 PROCEDURE — 93321 DOPPLER ECHO F-UP/LMTD STD: CPT | Mod: 26

## 2017-06-11 RX ORDER — VANCOMYCIN HCL 1 G
1000 VIAL (EA) INTRAVENOUS ONCE
Qty: 0 | Refills: 0 | Status: DISCONTINUED | OUTPATIENT
Start: 2017-06-12 | End: 2017-06-12

## 2017-06-11 RX ORDER — HEPARIN SODIUM 5000 [USP'U]/ML
100 INJECTION INTRAVENOUS; SUBCUTANEOUS
Qty: 25000 | Refills: 0 | Status: DISCONTINUED | OUTPATIENT
Start: 2017-06-11 | End: 2017-06-11

## 2017-06-11 RX ORDER — HEPARIN SODIUM 5000 [USP'U]/ML
100 INJECTION INTRAVENOUS; SUBCUTANEOUS
Qty: 25000 | Refills: 0 | Status: DISCONTINUED | OUTPATIENT
Start: 2017-06-11 | End: 2017-06-12

## 2017-06-11 RX ORDER — POTASSIUM CHLORIDE 20 MEQ
20 PACKET (EA) ORAL ONCE
Qty: 0 | Refills: 0 | Status: COMPLETED | OUTPATIENT
Start: 2017-06-11 | End: 2017-06-11

## 2017-06-11 RX ORDER — POTASSIUM CHLORIDE 20 MEQ
20 PACKET (EA) ORAL
Qty: 0 | Refills: 0 | Status: COMPLETED | OUTPATIENT
Start: 2017-06-11 | End: 2017-06-11

## 2017-06-11 RX ORDER — CHLORHEXIDINE GLUCONATE 213 G/1000ML
1 SOLUTION TOPICAL ONCE
Qty: 0 | Refills: 0 | Status: COMPLETED | OUTPATIENT
Start: 2017-06-11 | End: 2017-06-12

## 2017-06-11 RX ORDER — CIPROFLOXACIN LACTATE 400MG/40ML
400 VIAL (ML) INTRAVENOUS ONCE
Qty: 0 | Refills: 0 | Status: DISCONTINUED | OUTPATIENT
Start: 2017-06-12 | End: 2017-06-12

## 2017-06-11 RX ORDER — FLUCONAZOLE 150 MG/1
400 TABLET ORAL ONCE
Qty: 0 | Refills: 0 | Status: DISCONTINUED | OUTPATIENT
Start: 2017-06-12 | End: 2017-06-12

## 2017-06-11 RX ORDER — SODIUM CHLORIDE 9 MG/ML
3 INJECTION INTRAMUSCULAR; INTRAVENOUS; SUBCUTANEOUS EVERY 8 HOURS
Qty: 0 | Refills: 0 | Status: DISCONTINUED | OUTPATIENT
Start: 2017-06-11 | End: 2017-06-12

## 2017-06-11 RX ORDER — CHLORHEXIDINE GLUCONATE 213 G/1000ML
5 SOLUTION TOPICAL ONCE
Qty: 0 | Refills: 0 | Status: COMPLETED | OUTPATIENT
Start: 2017-06-11 | End: 2017-06-12

## 2017-06-11 RX ADMIN — SODIUM CHLORIDE 3 MILLILITER(S): 9 INJECTION INTRAMUSCULAR; INTRAVENOUS; SUBCUTANEOUS at 14:14

## 2017-06-11 RX ADMIN — Medication 100 MILLIEQUIVALENT(S): at 23:44

## 2017-06-11 RX ADMIN — Medication 50 MILLIEQUIVALENT(S): at 05:41

## 2017-06-11 RX ADMIN — Medication 5 MG/HR: at 11:49

## 2017-06-11 RX ADMIN — HEPARIN SODIUM 1 UNIT(S)/HR: 5000 INJECTION INTRAVENOUS; SUBCUTANEOUS at 15:07

## 2017-06-11 RX ADMIN — Medication 50 MILLIEQUIVALENT(S): at 11:19

## 2017-06-11 RX ADMIN — MILRINONE LACTATE 13.5 MICROGRAM(S)/KG/MIN: 1 INJECTION, SOLUTION INTRAVENOUS at 11:49

## 2017-06-11 RX ADMIN — Medication 1 TABLET(S): at 11:25

## 2017-06-11 RX ADMIN — SENNA PLUS 2 TABLET(S): 8.6 TABLET ORAL at 21:06

## 2017-06-11 RX ADMIN — Medication 100 MILLIGRAM(S): at 14:53

## 2017-06-11 RX ADMIN — Medication 5 MILLIGRAM(S): at 11:25

## 2017-06-11 RX ADMIN — MEXILETINE HYDROCHLORIDE 150 MILLIGRAM(S): 150 CAPSULE ORAL at 14:53

## 2017-06-11 RX ADMIN — Medication 10 MILLIGRAM(S): at 11:25

## 2017-06-11 RX ADMIN — MEXILETINE HYDROCHLORIDE 150 MILLIGRAM(S): 150 CAPSULE ORAL at 21:06

## 2017-06-11 RX ADMIN — MEXILETINE HYDROCHLORIDE 150 MILLIGRAM(S): 150 CAPSULE ORAL at 05:40

## 2017-06-11 RX ADMIN — Medication 100 MILLIGRAM(S): at 05:40

## 2017-06-11 RX ADMIN — Medication 5 MG/HR: at 02:16

## 2017-06-11 RX ADMIN — Medication 100 MILLIGRAM(S): at 21:06

## 2017-06-11 RX ADMIN — Medication 50 MILLIEQUIVALENT(S): at 08:17

## 2017-06-11 RX ADMIN — SODIUM CHLORIDE 3 MILLILITER(S): 9 INJECTION INTRAMUSCULAR; INTRAVENOUS; SUBCUTANEOUS at 20:23

## 2017-06-11 RX ADMIN — Medication 500 MILLIGRAM(S): at 11:25

## 2017-06-11 RX ADMIN — HEPARIN SODIUM 1 UNIT(S)/HR: 5000 INJECTION INTRAVENOUS; SUBCUTANEOUS at 03:19

## 2017-06-11 RX ADMIN — Medication 1 MILLIGRAM(S): at 11:25

## 2017-06-11 NOTE — PROGRESS NOTE ADULT - SUBJECTIVE AND OBJECTIVE BOX
Patient is a 67y old  Male who presents with a chief complaint of Transfer from [facility] for management of ADHF/ LVAD eval (03 Jun 2017 00:32)      Events, no events ON, no CP, SOB, N/V, palpitations    MEDICATIONS  (STANDING):  mexiletine 150milliGRAM(s) Oral three times a day  docusate sodium 100milliGRAM(s) Oral three times a day  senna 2Tablet(s) Oral at bedtime  multivitamin/minerals 1Tablet(s) Oral daily  calcium carbonate  625 mG + Vitamin D (OsCal 250 + D) 1Tablet(s) Oral daily  folic acid 1milliGRAM(s) Oral daily  ascorbic acid 500milliGRAM(s) Oral daily  furosemide Infusion 10mG/Hr IV Continuous <Continuous>  milrinone Infusion 0.6MICROgram(s)/kG/Min IV Continuous <Continuous>  phytonadione   Solution 10milliGRAM(s) Oral daily  freetext medication -  Infusion 55031Uhbf(s) IV Continuous <Continuous>  heparin  Infusion 100Unit(s)/Hr IV Continuous <Continuous>  potassium chloride  20 mEq/100 mL IVPB 20milliEquivalent(s) IV Intermittent every 2 hours    MEDICATIONS  (PRN):  bisacodyl 5milliGRAM(s) Oral every 12 hours PRN Constipation  aluminum hydroxide/magnesium hydroxide/simethicone Suspension 30milliLiter(s) Oral every 6 hours PRN Dyspepsia      REVIEW OF SYSTEMS:  Otherwise, 10 point ROS done and otherwise negative.    PHYSICAL EXAM:  Vital Signs Last 24 Hrs  T(C): 36.6, Max: 36.6 (06-11 @ 04:00)  T(F): 97.9, Max: 97.9 (06-11 @ 04:00)  HR: 110 (84 - 116)  BP: 95/66 (80/54 - 105/58)  BP(mean): 80 (63 - 94)  RR: 11 (11 - 24)  SpO2: 95% (90% - 97%)  I&O's Summary  I & Os for 24h ending 11 Jun 2017 07:00  =============================================  IN: 1775 ml / OUT: 4530 ml / NET: -2755 ml    I & Os for current day (as of 11 Jun 2017 08:25)  =============================================  IN: 34.5 ml / OUT: 350 ml / NET: -315.5 ml      Appearance: Normal	  HEENT:   Normal oral mucosa, PERRL, EOMI	  Cardiovascular: Normal S1 S2, No JVD, No murmurs, No edema  Respiratory: Lungs clear to auscultation	  Psychiatry: A & O x 3, Mood & affect appropriate  Gastrointestinal:  Soft, Non-tender,   Skin: No rashes, No ecchymoses, No cyanosis, impella R groin, R IJ swan   Neurologic: Non-focal  Extremities: Normal range of motion, No clubbing, cyanosis or edema  Vascular: Peripheral pulses palpable 2+ bilaterally    LABS:	 	                        8.8    7.5   )-----------( 262      ( 11 Jun 2017 02:17 )             28.0     Auto Eosinophil # x     / Auto Eosinophil % x     / Auto Neutrophil # x     / Auto Neutrophil % x     / BANDS % x                            8.8    7.4   )-----------( 283      ( 10 Kennedy 2017 21:30 )             28.1     Auto Eosinophil # x     / Auto Eosinophil % x     / Auto Neutrophil # x     / Auto Neutrophil % x     / BANDS % x                            9.2    5.6   )-----------( 296      ( 10 Kennedy 2017 14:20 )             29.4     Auto Eosinophil # 0.0   / Auto Eosinophil % 0.8   / Auto Neutrophil # 4.2   / Auto Neutrophil % 74.0  / BANDS % x        INR: 1.69 ratio (06-11 @ 02:17)  INR: 1.81 ratio (06-10 @ 19:19)  INR: 2.23 ratio (06-10 @ 14:20)    06-11    136  |  97  |  37<H>  ----------------------------<  94  3.8   |  24  |  1.42<H>  06-10    134<L>  |  96  |  41<H>  ----------------------------<  91  3.7   |  23  |  1.58<H>  06-10    132<L>  |  95<L>  |  43<H>  ----------------------------<  98  4.5   |  20<L>  |  1.69<H>    Ca    8.6      11 Jun 2017 04:27  Mg     2.4     06-11  Phos  3.3     06-11  TPro  6.7  /  Alb  3.2<L>  /  TBili  6.4<H>  /  DBili  x   /  AST  422<H>  /  ALT  136<H>  /  AlkPhos  87  06-11  TPro  6.8  /  Alb  3.2<L>  /  TBili  5.5<H>  /  DBili  x   /  AST  402<H>  /  ALT  139<H>  /  AlkPhos  82  06-10  TPro  6.6  /  Alb  3.3  /  TBili  2.7<H>  /  DBili  x   /  AST  303<H>  /  ALT  144<H>  /  AlkPhos  84  06-10    Lactate, Blood: 1.7 mmol/L (06-09 @ 06:27)  Lactate, Blood: 2.2 mmol/L (06-08 @ 17:33)      proBNP:   Lipid Profile: 06-07 Chol 62 LDL 38 HDL 17<L> Trig 33  HgA1c: 5.5 % (06-07 @ 06:14)    TSH: Thyroid Stimulating Hormone, Serum: 3.54 uIU/mL (06-07 @ 06:14)      CARDIAC MARKERS:   03 Jun 2017 06:46 Troponin 0.08 ng/mL / Creatine Kinase 49 U/L /  CKMB 1.8 ng/mL / CPK Mass Assay % x       02 Jun 2017 17:42 Troponin 0.08 ng/mL / Creatine Kinase 50 u/L /  CKMB x     / CPK Mass Assay % x            TELEMETRY: 	    ECG:  	  RADIOLOGY:  OTHER: 	    PREVIOUS DIAGNOSTIC TESTING:    [ ] Echocardiogram:  [ ]  Catheterization:  [ ] Stress Test:

## 2017-06-11 NOTE — PROGRESS NOTE ADULT - PROBLEM SELECTOR PLAN 3
Improved.    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 Follow up  Heme recommendations.   Heme recommendations appreciated: Continue Vitamin K 10 mg PO QD trial x3 days  Follow up F7 Assay.

## 2017-06-11 NOTE — PROGRESS NOTE ADULT - SUBJECTIVE AND OBJECTIVE BOX
The patient has completed the LVAD evaluation and all information has been reviewed by team. He was presented to an ad Hoc committee and he was accepted.  He is considered a good candidate for left ventricular assist device therapy and we will proceed with placement.  NYHA Class 4,Stage D, Intermacs 1,  Patient is a DT because he is too sick to be assessed for transplant. The information regarding LVAD therapy has been reviewed with patient and he consents to continuing with implantation.     •	Antiplatelet and anticoagulation has been held for >5 days        •	All lab work has been reviewed  •	Patient is free of infection  •	Patient is free of skin lesions/rashes of chest and abdomen  •	All IV and lines are <5days old  •	Consents have been obtained for Intermacs, and LVAD Consent, Medical rep consent  •	All equipment has been reviewed and all members of  the LVAD team have been notified of impending implantation           VITAL SIGNS    Telemetry:    Vital Signs Last 24 Hrs  T(C): 36.6, Max: 36.6 ( @ 04:00)  T(F): 97.9, Max: 97.9 ( @ 04:00)  HR: 112 (84 - 118)  BP: 93/68 (85/67 - 105/58)  RR: 14 (11 - 24)  SpO2: 94% (90% - 98%)  Wt(kg): --          I & Os for 24h ending  @ 07:00  =============================================  IN: 1775.8 ml / OUT: 4530 ml / NET: -2754.2 ml    I & Os for current day (as of  @ 15:12)  =============================================  IN: 514.1 ml / OUT: 2250 ml / NET: -1735.9 ml     Daily     Daily Weight in k.2 (2017 01:00)  Admit Wt: Drug Dosing Weight  Height (cm): 172.7 (2017 21:20)  Weight (kg): 75 (2017 21:20)  BMI (kg/m2): 25.1 (2017 21:20)  BSA (m2): 1.88 (2017 21:20)    Bilirubin Total, Serum: 6.4 mg/dL ( @ 14:11)  Bilirubin Total, Serum: 6.4 mg/dL ( @ 04:27)  Bilirubin Total, Serum: 5.5 mg/dL (06-10 @ 21:30)               MEDICATIONS  mexiletine 150milliGRAM(s) Oral three times a day  docusate sodium 100milliGRAM(s) Oral three times a day  senna 2Tablet(s) Oral at bedtime  multivitamin/minerals 1Tablet(s) Oral daily  calcium carbonate  625 mG + Vitamin D (OsCal 250 + D) 1Tablet(s) Oral daily  folic acid 1milliGRAM(s) Oral daily  ascorbic acid 500milliGRAM(s) Oral daily  bisacodyl 5milliGRAM(s) Oral every 12 hours PRN  furosemide Infusion 10mG/Hr IV Continuous <Continuous>  milrinone Infusion 0.6MICROgram(s)/kG/Min IV Continuous <Continuous>  aluminum hydroxide/magnesium hydroxide/simethicone Suspension 30milliLiter(s) Oral every 6 hours PRN  phytonadione   Solution 10milliGRAM(s) Oral daily  freetext medication -  Infusion 24565Bkoo(s) IV Continuous <Continuous>  heparin  Infusion 100Unit(s)/Hr IV Continuous <Continuous>  chlorhexidine 4% Liquid 1Application(s) Topical once  chlorhexidine 0.12% Liquid 5milliLiter(s) Swish and Spit once  sodium chloride 0.9% lock flush 3milliLiter(s) IV Push every 8 hours      PHYSICAL EXAM    Subjective:  Neurology: alert and oriented x 3, nonfocal, no gross deficits  CV : S1S2  Lungs: clear  Abdomen: soft, nontender, nondistended, positive bowel sounds,   :     Rosas : pink tinged urine due to hemolysis  Extremities: No C/C/E      LABS      135  |  99  |  32<H>  ----------------------------<  102<H>  4.2   |  24  |  1.37<H>    Ca    8.9      2017 14:11  Phos  3.3       Mg     2.4     -    TPro  6.8  /  Alb  3.2<L>  /  TBili  6.4<H>  /  DBili  x   /  AST  391<H>  /  ALT  123<H>  /  AlkPhos  96                                   9.0    9.9   )-----------( 269      ( 2017 14:11 )             29.0          PT/INR - ( 2017 02:17 )   PT: 18.6 sec;   INR: 1.69 ratio         PTT - ( 2017 11:16 )  PTT:63.5 sec          PAST MEDICAL & SURGICAL HISTORY:  H/O prior ablation treatment: for Afib  Ventricular fibrillation: s/p AICD  PAF (paroxysmal atrial fibrillation): on xarelto  Non-Ischemic Cardiomyopathy  SVT (Supraventricular Tachycardia)  CHF (Congestive Heart Failure)  Status post left hip replacement  Hypertension  History of Prior Ablation Treatment: for afib  AICD (Automatic Cardioverter/Defibrillator) Present: [employer] with 1 [employer] lead09- explanted and replaced with Medtronic 2 leads on 09       ASSESSMENT      Preop course: Patient is currently on Milrinone and an Impella   Being monitored for  hemolysis.          PLAN  To OR in am for LVAD Implantation with a HM II The patient has completed the LVAD evaluation and all information has been reviewed by team. He was presented to an ad Hoc committee and he was accepted.  He is considered a good candidate for left ventricular assist device therapy and we will proceed with placement.  NYHA Class 4,Stage D, Intermacs 1,  Patient is a DT because he is too sick to be assessed for transplant. The information regarding LVAD therapy has been reviewed with patient and he consents to continuing with implantation.     •	Antiplatelet and anticoagulation has been held for >5 days        •	All lab work has been reviewed  •	Patient is free of infection  •	Patient is free of skin lesions/rashes of chest and abdomen  •	All IV and lines are <5days old  •	Consents have been obtained for Intermacs, and LVAD Consent, Medical rep consent  •	All equipment has been reviewed and all members of  the LVAD team have been notified of impending implantation           VITAL SIGNS    Telemetry:    Vital Signs Last 24 Hrs  T(C): 36.6, Max: 36.6 ( @ 04:00)  T(F): 97.9, Max: 97.9 ( @ 04:00)  HR: 112 (84 - 118)  BP: 93/68 (85/67 - 105/58)  RR: 14 (11 - 24)  SpO2: 94% (90% - 98%)  Wt(kg): --          I & Os for 24h ending  @ 07:00  =============================================  IN: 1775.8 ml / OUT: 4530 ml / NET: -2754.2 ml    I & Os for current day (as of  @ 15:12)  =============================================  IN: 514.1 ml / OUT: 2250 ml / NET: -1735.9 ml     Daily     Daily Weight in k.2 (2017 01:00)  Admit Wt: Drug Dosing Weight  Height (cm): 172.7 (2017 21:20)  Weight (kg): 75 (2017 21:20)  BMI (kg/m2): 25.1 (2017 21:20)  BSA (m2): 1.88 (2017 21:20)    Bilirubin Total, Serum: 6.4 mg/dL ( @ 14:11)  Bilirubin Total, Serum: 6.4 mg/dL ( @ 04:27)  Bilirubin Total, Serum: 5.5 mg/dL (06-10 @ 21:30)               MEDICATIONS  mexiletine 150milliGRAM(s) Oral three times a day  docusate sodium 100milliGRAM(s) Oral three times a day  senna 2Tablet(s) Oral at bedtime  multivitamin/minerals 1Tablet(s) Oral daily  calcium carbonate  625 mG + Vitamin D (OsCal 250 + D) 1Tablet(s) Oral daily  folic acid 1milliGRAM(s) Oral daily  ascorbic acid 500milliGRAM(s) Oral daily  bisacodyl 5milliGRAM(s) Oral every 12 hours PRN  furosemide Infusion 10mG/Hr IV Continuous <Continuous>  milrinone Infusion 0.6MICROgram(s)/kG/Min IV Continuous <Continuous>  aluminum hydroxide/magnesium hydroxide/simethicone Suspension 30milliLiter(s) Oral every 6 hours PRN  phytonadione   Solution 10milliGRAM(s) Oral daily  freetext medication -  Infusion 11284Zgxn(s) IV Continuous <Continuous>  heparin  Infusion 100Unit(s)/Hr IV Continuous <Continuous>  chlorhexidine 4% Liquid 1Application(s) Topical once  chlorhexidine 0.12% Liquid 5milliLiter(s) Swish and Spit once  sodium chloride 0.9% lock flush 3milliLiter(s) IV Push every 8 hours      PHYSICAL EXAM    Subjective:  Neurology: alert and oriented x 3, nonfocal, no gross deficits  CV : S1S2  Lungs: clear  Abdomen: soft, nontender, nondistended, positive bowel sounds,   :     Rosas : pink tinged urine due to hemolysis  Extremities: No C/C/E      LABS      135  |  99  |  32<H>  ----------------------------<  102<H>  4.2   |  24  |  1.37<H>    Ca    8.9      2017 14:11  Phos  3.3     -  Mg     2.4     -    TPro  6.8  /  Alb  3.2<L>  /  TBili  6.4<H>  /  DBili  x   /  AST  391<H>  /  ALT  123<H>  /  AlkPhos  96  06-11              LDH 1400: 1808                     9.0    9.9   )-----------( 269      ( 2017 14:11 )             29.0          PT/INR - ( 2017 02:17 )   PT: 18.6 sec;   INR: 1.69 ratio         PTT - ( 2017 11:16 )  PTT:63.5 sec          PAST MEDICAL & SURGICAL HISTORY:  H/O prior ablation treatment: for Afib  Ventricular fibrillation: s/p AICD  PAF (paroxysmal atrial fibrillation): on xarelto  Non-Ischemic Cardiomyopathy  SVT (Supraventricular Tachycardia)  CHF (Congestive Heart Failure)  Status post left hip replacement  Hypertension  History of Prior Ablation Treatment: for afib  AICD (Automatic Cardioverter/Defibrillator) Present: [employer] with 1 [employer] lead09- explanted and replaced with Medtronic 2 leads on 09       ASSESSMENT      Preop course: Patient is currently on Milrinone and an Impella   Being monitored for  hemolysis.          PLAN  To OR in am for LVAD Implantation with a HM II  Will cont to monitor for hemolysis and assess if OR needs to be earlier

## 2017-06-11 NOTE — PROGRESS NOTE ADULT - PROBLEM SELECTOR PLAN 1
Patient with severe global LVSD with EF 15% 5/2017 on palliative Milrinone.  s/p [place] Cassidy and impella w/ improving CO and CI  -dec lasix gtt to 10 and wean off milrinone as tolerated with impella    BMP q6hrs- replete electrolytes PRN.   Continue LVAD evaluation.   Follow up Heart Failure recommendations.   Strict I/Os. Daily Weights.  -hemolysis 2/2 impella- trend hemolysis labs and H/H, impella dec to p6,

## 2017-06-11 NOTE — PROGRESS NOTE ADULT - ASSESSMENT
67 year old man history of advanced non-ischemic cardiomyopathy NHYA IV/AHA-D, bi-ventricular HFreF(EF15% 5/2017) s/p AICD on home milrinone 3mcg/kg/min, PAF on AC, VT, recent hospitalization 5/2017 for AICD firing with admission to the CCU presents with dizziness, N/V x 1 day after exertion a/w acute on chronic decompensated HF with CCU transfer for pressure support diuresis. Hospital course c/b NORMAN likely 2/2 cardiorenal +/- postobstructive etiology, supra-therapeutic INR. s/p [place] Cassidy yesterday w/o complications and now s/p impella

## 2017-06-11 NOTE — PROGRESS NOTE ADULT - ATTENDING COMMENTS
Mr Baez is resting comfortably, feeling markedly better than yesterday, and accordingly in markedly better spirits today.  He remains on Lasix and Milrinone drips with favorable urine output.  His labs over night demonstrated evidence of likely Impella induced hemolysis, for which the performance was reduced from P8 to P6--repeat hemolysis labs are pending at this time.  With such reduction, the MVO2 have slightly decreased from the 60's to the 50's; his labs have remained stable. Mr Baez is resting comfortably, feeling markedly better than yesterday, and accordingly in markedly better spirits today.  He reports no angina, dyspnea, or pain.  He remains on Lasix and Milrinone drips with favorable urine output.  His labs over night demonstrated evidence of likely Impella induced hemolysis, for which the performance was reduced from P8 to P6--repeat hemolysis labs are pending at this time.  With such reduction, the MVO2 have slightly decreased from the 60's to the 50's; his labs of significance include an INR that is now less than 2 (he remains on heparin), improving renal function, and elevated but stable LFT's.  His case was reviewed in detail with the CHF team, and they are considering accelerating the time line of his LVAD in context of the hemolysis--for which he is now being kept NPO.  A bowel regimen was initiated.  TTE was done and pending results regarding Impella placement.  Hematology input appreciated with respect to his anticoagulation strategy (given recent interventions for correction and eventual need for therapeutic anticoagulation with heparin at the time of his LVAD procedure).  These recommendations and plan have been discussed in detail with the patient; all of his questions were answered.  Ricky Johns MD

## 2017-06-11 NOTE — PROGRESS NOTE ADULT - ASSESSMENT
67M Htn, pAfib s/p albation, NICM/dilated cardiomypathy (LVID 8cm) on home milrinone, SJ-AICD p/w dizziness and NORMAN, ADHF in low output state, s/p Impella placement with evidence of hemolysis on P6.     -Continue milrinone gtt at 0.6mcg  -keep O>I with IV Lasix gtt, monitor Cr.   -Cont' mexiletine.   - NPO pending LVAD decision  - Trend LDH, MVO2  - F/U echo for impella positioning  -Heme recs apprecaited

## 2017-06-11 NOTE — PROGRESS NOTE ADULT - PROBLEM SELECTOR PLAN 9
on hep gtt
Patient with h/o GIB in 4/2017 with evidence of gastric ulcer on EGD   GI recommendations appreciated: no plan for repeat EGD. No contraindication for A/C.

## 2017-06-11 NOTE — PROGRESS NOTE ADULT - PROBLEM SELECTOR PLAN 8
Patient with h/o GIB in 4/2017 with evidence of gastric ulcer on EGD   GI recommendations appreciated: no plan for repeat EGD. No contraindication for A/C.

## 2017-06-11 NOTE — PROGRESS NOTE ADULT - SUBJECTIVE AND OBJECTIVE BOX
HPI:  66y/o M with advanced NICM BiV HFrEF 15-20% on a stable dose of milrinone at 3mcg/kg/min via PICC s/p AICD, with replacement from [facility] to [employer] (), PAF on AC and h/o VT, with recent admission for AICD firing, now presenting with dizziness and nausea/vomiting X 1 Day. He says that he was doing well earlier in the day and felt like his usual self. Normally he only walks from his living room to the front steps and this afternoon he decided to walk around a city block, which is significantly longer than he normally walks for. A while after that he developed nausea and vomited NBNB 3x and felt dizzy. ROS otherwise negative for f/c/ns/cp/abd p/diarrhea/consitpation, no syncope; no changes in medication, taking as prescribed. He says that his normal BP is around 90 systolic.    Patient initially presented to [facility], seen by cardiology. Labs noteable for pro bnp 3424, Trop T 0.08, CK 50, hyponatremia to 133 (from 135 two weeks prior), INR elevated at 4.36, Cr 2.07 from BL 0.9-1, BR 1.8. EKG showing NSR with first degree block. (2017 00:32)    Labs seem to be consistent with hemolysis     Review Of Systems:  Constitutional: [ ] Fever [ ] Chills [ ] Fatigue [ ] Weight change   HEENT: [ ] Blurred vision [ ] Eye Pain [ ] Headache [ ] Runny nose [ ] Sore Throat   Respiratory: [ ] Cough [ ] Wheezing [ ] Shortness of breath  Cardiovascular: [ ] Chest Pain [ ] Palpitations [ ] LOBATO [ ] PND [ ] Orthopnea  Gastrointestinal: [ ] Abdominal Pain [ ] Diarrhea [ ] Constipation [ ] Hemorrhoids [ ] Nausea [ ] Vomiting  Genitourinary: [ ] Nocturia [ ] Dysuria [ ] Incontinence  Extremities: [ ] Swelling [ ] Joint Pain  Neurologic: [ ] Focal deficit [ ] Paresthesias [ ] Syncope  Lymphatic: [ ] Swelling [ ] Lymphadenopathy   Skin: [ ] Rash [ ] Ecchymoses [ ] Wounds [ ] Lesions  Psychiatry: [ ] Depression [ ] Suicidal/Homicidal Ideation [ ] Anxiety [ ] Sleep Disturbances  [x ] 10 point review of systems is otherwise negative except as mentioned above            [ ]Unable to obtain    Medications:  mexiletine 150milliGRAM(s) Oral three times a day  docusate sodium 100milliGRAM(s) Oral three times a day  senna 2Tablet(s) Oral at bedtime  multivitamin/minerals 1Tablet(s) Oral daily  calcium carbonate  625 mG + Vitamin D (OsCal 250 + D) 1Tablet(s) Oral daily  folic acid 1milliGRAM(s) Oral daily  ascorbic acid 500milliGRAM(s) Oral daily  bisacodyl 5milliGRAM(s) Oral every 12 hours PRN  furosemide Infusion 10mG/Hr IV Continuous <Continuous>  milrinone Infusion 0.6MICROgram(s)/kG/Min IV Continuous <Continuous>  aluminum hydroxide/magnesium hydroxide/simethicone Suspension 30milliLiter(s) Oral every 6 hours PRN  phytonadione   Solution 10milliGRAM(s) Oral daily  freetext medication -  Infusion 23046Twfc(s) IV Continuous <Continuous>  heparin  Infusion 100Unit(s)/Hr IV Continuous <Continuous>  potassium chloride  20 mEq/100 mL IVPB 20milliEquivalent(s) IV Intermittent every 2 hours    Vitals:  T(C): 36.6, Max: 36.6 (-11 @ 04:00)  HR: 118 (84 - 118)  BP: 99/78 (80/54 - 105/58)  BP(mean): 89 (63 - 94)  ABP: 96/70 (92/71 - 126/86)  ABP(mean): 77 (74 - 100)  RR: 15 (11 - 24)  SpO2: 95% (90% - 97%)  Wt(kg): --  CVP(cm H2O): --  CO: 5.4 (4.1 - 7.4)  CI: 2.8 (2.1 - 3.9)  PA: 59/30 (49/27 - 72/39)  PA(mean): 42 (35 - 50)  PCWP: 21 (21 - 21)  SVR: 1408 (1295 - 1408)  PVR: 209 (144 - 209)    Daily     Daily Weight in k.2 (2017 01:00)    I&O's Summary  I & Os for 24h ending 2017 07:00  =============================================  IN: 1775 ml / OUT: 4530 ml / NET: -2755 ml    I & Os for current day (as of 2017 09:48)  =============================================  IN: 274.5 ml / OUT: 350 ml / NET: -75.5 ml    Physical Exam:  Appearance: [ ] Normal [ ] NAD  Eyes: [ ] PERRL [ ] EOMI  HENT: [ ] Normal oral [place] [ ]NC/AT  Cardiovascular: [ ] S1 [ ] S2 [ ] RRR [ ] No m/r/g [ ]No edema [ ] JVP  Procedural Access Site: [ ] No hematoma [ ] Non-tender to palpation [ ] 2+ pulse [ ] No bruit [ ] No Ecchymosis  Respiratory: [ ] Clear to auscultation bilaterally  Gastrointestinal: [ ] Soft [ ] Non-tender [ ] Non-distended [ ] BS+  Musculoskeletal: [ ] No clubbing [ ] No joint deformity   Neurologic: [ ] Non-focal  Lymphatic: [ ] No lymphadenopathy  Psychiatry: [ ] AAOx3 [ ] Mood & affect appropriate  Skin: [ ] No rashes [ ] No ecchymoses [ ] No cyanosis    Impella:      Tele: Afib, PVCs    Labs:                        8.8    7.5   )-----------( 262      ( 2017 02:17 )             28.0         136  |  97  |  37<H>  ----------------------------<  94  3.8   |  24  |  1.42<H>    Ca    8.6      2017 04:27  Phos  3.3     -  Mg     2.4         TPro  6.7  /  Alb  3.2<L>  /  TBili  6.4<H>  /  DBili  x   /  AST  422<H>  /  ALT  136<H>  /  AlkPhos  87  -11    PT/INR - ( 2017 02:17 )   PT: 18.6 sec;   INR: 1.69 ratio         PTT - ( 2017 02:17 )  PTT:53.4 sec    Oxygen Saturation, Mixed: 55 % ( 05:10)  Oxygen Saturation, Mixed: 48 % ( 04:21)  Oxygen Saturation, Mixed: 66 % (06-10 @ 21:16)  Oxygen Saturation, Mixed: 60 % (06-10 @ 13:21)  Oxygen Saturation, Mixed: 36 % (06-10 @ 04:24)  Oxygen Saturation, Mixed: 32 % ( 22:42)  Oxygen Saturation, Mixed: 88 % ( @ 21:56)  Oxygen Saturation, Mixed: 36 % ( 17:11)    Lactate Dehydrogenase, Serum: 1777 U/L ( 04:27)  Lactate Dehydrogenase, Serum: 1404 U/L (06-10 @ 21:30)  Lactate Dehydrogenase, Serum: 989 U/L (06-10 @ 14:20)  Lactate Dehydrogenase, Serum: 353 U/L ( 06:26)    Lactate, Blood: 1.7 mmol/L ( 06:27)  Lactate, Blood: 2.2 mmol/L ( 17:33)    -----------------------------------------------------------------  [ ] Congestive Heart Failure                  [ ] Acute                                                [ ] Acute on Chronic     [ ] Chronic  [ ] Diastolic (HFpEF)  [ ] Systolic (HFrEF)  [ ] Combined Systolic & Diastolic      [ ] ACC/AHA Stage: _____     [ ] NYHA Class: _____  -----------------------------------------------------------------  [ ] NORMAN                                                    [ ] CKD I  [ ] ATN                                                   [ ] CKD II  [ ] Other: _____                                       [ ] CKD III                                                                [ ] CKD IV                                                                [ ] CKD V                                                                [ ] ESRD  -----------------------------------------------------------------  Abnormal Nutritional Status:  [ ] Malnutrtion (see nutrition note)  [ ] Underweight: BMI < 19  [ ] Morbid Obeisty: BMI >/ 40  [ ] Cachexia  [ ] Other HPI:  66y/o M with advanced NICM BiV HFrEF 15-20% on a stable dose of milrinone at 3mcg/kg/min via PICC s/p AICD, with replacement from [facility] to [employer] (), PAF on AC and h/o VT, with recent admission for AICD firing, now presenting with dizziness and nausea/vomiting X 1 Day. He says that he was doing well earlier in the day and felt like his usual self. Normally he only walks from his living room to the front steps and this afternoon he decided to walk around a city block, which is significantly longer than he normally walks for. A while after that he developed nausea and vomited NBNB 3x and felt dizzy. ROS otherwise negative for f/c/ns/cp/abd p/diarrhea/consitpation, no syncope; no changes in medication, taking as prescribed. He says that his normal BP is around 90 systolic.    Patient initially presented to [facility], seen by cardiology. Labs noteable for pro bnp 3424, Trop T 0.08, CK 50, hyponatremia to 133 (from 135 two weeks prior), INR elevated at 4.36, Cr 2.07 from BL 0.9-1, BR 1.8. EKG showing NSR with first degree block. (2017 00:32)    Labs seem to be consistent with hemolysis --> increasing LDH.  MVO2: 55%    Review Of Systems:  Constitutional: [ ] Fever [ ] Chills [ ] Fatigue [ ] Weight change   HEENT: [ ] Blurred vision [ ] Eye Pain [ ] Headache [ ] Runny nose [ ] Sore Throat   Respiratory: [ ] Cough [ ] Wheezing [ ] Shortness of breath  Cardiovascular: [ ] Chest Pain [ ] Palpitations [ ] LOBATO [ ] PND [ ] Orthopnea  Gastrointestinal: [ ] Abdominal Pain [ ] Diarrhea [ ] Constipation [ ] Hemorrhoids [ ] Nausea [ ] Vomiting  Genitourinary: [ ] Nocturia [ ] Dysuria [ ] Incontinence  Extremities: [ ] Swelling [ ] Joint Pain  Neurologic: [ ] Focal deficit [ ] Paresthesias [ ] Syncope  Lymphatic: [ ] Swelling [ ] Lymphadenopathy   Skin: [ ] Rash [ ] Ecchymoses [ ] Wounds [ ] Lesions  Psychiatry: [ ] Depression [ ] Suicidal/Homicidal Ideation [ ] Anxiety [ ] Sleep Disturbances  [x ] 10 point review of systems is otherwise negative except as mentioned above            [ ]Unable to obtain    Medications:  mexiletine 150milliGRAM(s) Oral three times a day  docusate sodium 100milliGRAM(s) Oral three times a day  senna 2Tablet(s) Oral at bedtime  multivitamin/minerals 1Tablet(s) Oral daily  calcium carbonate  625 mG + Vitamin D (OsCal 250 + D) 1Tablet(s) Oral daily  folic acid 1milliGRAM(s) Oral daily  ascorbic acid 500milliGRAM(s) Oral daily  bisacodyl 5milliGRAM(s) Oral every 12 hours PRN  furosemide Infusion 10mG/Hr IV Continuous <Continuous>  milrinone Infusion 0.6MICROgram(s)/kG/Min IV Continuous <Continuous>  aluminum hydroxide/magnesium hydroxide/simethicone Suspension 30milliLiter(s) Oral every 6 hours PRN  phytonadione   Solution 10milliGRAM(s) Oral daily  freetext medication -  Infusion 64425Hkdl(s) IV Continuous <Continuous>  heparin  Infusion 100Unit(s)/Hr IV Continuous <Continuous>  potassium chloride  20 mEq/100 mL IVPB 20milliEquivalent(s) IV Intermittent every 2 hours    Vitals:  T(C): 36.6, Max: 36.6 (-11 @ 04:00)  HR: 118 (84 - 118)  BP: 99/78 (80/54 - 105/58)  BP(mean): 89 (63 - 94)  ABP: 96/70 (92/71 - 126/86)  ABP(mean): 77 (74 - 100)  RR: 15 (11 - 24)  SpO2: 95% (90% - 97%)  CO: 5.4 (4.1 - 7.4)  CI: 2.8 (2.1 - 3.9)  PA: 59/30 (49/27 - 72/39)  PA(mean): 42 (35 - 50)  PCWP: 21 (21 - 21)  SVR: 1408 (1295 - 1408)  PVR: 209 (144 - 209)    PCWP: 20 CVP 8    Daily     Daily Weight in k.2 (2017 01:00)    I&O's Summary  I & Os for 24h ending 2017 07:00  =============================================  IN: 1775 ml / OUT: 4530 ml / NET: -2755 ml    I & Os for current day (as of 2017 09:48)  =============================================  IN: 274.5 ml / OUT: 350 ml / NET: -75.5 ml    Physical Exam:  Appearance: [x ] Normal [x ] NAD  Eyes: [x ] PERRL [x ] EOMI  HENT: [x ]NC/AT Poor dentition  Cardiovascular: [ ] S1 [ ] S2 [ ] RRR [ ] No m/r/g [ ]No edema [ ] JVP  Procedural Access Site: [ ] No hematoma [ ] Non-tender to palpation [ ] 2+ pulse [ ] No bruit [ ] No Ecchymosis  Respiratory: [ ] Clear to auscultation bilaterally  Gastrointestinal: [ ] Soft [ ] Non-tender [ ] Non-distended [ ] BS+  Musculoskeletal: [ ] No clubbing [ ] No joint deformity   Neurologic: [ ] Non-focal  Lymphatic: [ ] No lymphadenopathy  Psychiatry: [ ] AAOx3 [ ] Mood & affect appropriate  Skin: [ ] No rashes [ ] No ecchymoses [ ] No cyanosis    Impella:      Tele: Afib, PVCs    Labs:                        8.8    7.5   )-----------( 262      ( 2017 02:17 )             28.0         136  |  97  |  37<H>  ----------------------------<  94  3.8   |  24  |  1.42<H>    Ca    8.6      2017 04:27  Phos  3.3     -  Mg     2.4         TPro  6.7  /  Alb  3.2<L>  /  TBili  6.4<H>  /  DBili  x   /  AST  422<H>  /  ALT  136<H>  /  AlkPhos  87      PT/INR - ( 2017 02:17 )   PT: 18.6 sec;   INR: 1.69 ratio         PTT - ( 2017 02:17 )  PTT:53.4 sec    Oxygen Saturation, Mixed: 55 % ( 05:10)  Oxygen Saturation, Mixed: 48 % ( 04:21)  Oxygen Saturation, Mixed: 66 % (06-10 @ 21:16)  Oxygen Saturation, Mixed: 60 % (06-10 @ 13:21)  Oxygen Saturation, Mixed: 36 % (06-10 @ 04:24)  Oxygen Saturation, Mixed: 32 % ( @ 22:42)  Oxygen Saturation, Mixed: 88 % ( @ 21:56)  Oxygen Saturation, Mixed: 36 % ( 17:11)    Lactate Dehydrogenase, Serum: 1777 U/L ( 04:27)  Lactate Dehydrogenase, Serum: 1404 U/L (06-10 @ 21:30)  Lactate Dehydrogenase, Serum: 989 U/L (06-10 @ 14:20)  Lactate Dehydrogenase, Serum: 353 U/L ( 06:26)    Lactate, Blood: 1.7 mmol/L ( 06:27)  Lactate, Blood: 2.2 mmol/L ( @ 17:33)    -----------------------------------------------------------------  [ ] Congestive Heart Failure                  [ ] Acute                                                [ ] Acute on Chronic     [ ] Chronic  [ ] Diastolic (HFpEF)  [ ] Systolic (HFrEF)  [ ] Combined Systolic & Diastolic      [ ] ACC/AHA Stage: _____     [ ] NYHA Class: _____  -----------------------------------------------------------------  [ ] NORMAN                                                    [ ] CKD I  [ ] ATN                                                   [ ] CKD II  [ ] Other: _____                                       [ ] CKD III                                                                [ ] CKD IV                                                                [ ] CKD V                                                                [ ] ESRD  -----------------------------------------------------------------  Abnormal Nutritional Status:  [ ] Malnutrtion (see nutrition note)  [ ] Underweight: BMI < 19  [ ] Morbid Obeisty: BMI >/ 40  [ ] Cachexia  [ ] Other HPI:  66y/o M with advanced NICM BiV HFrEF 15-20% on a stable dose of milrinone at 3mcg/kg/min via PICC s/p AICD, with replacement from [employer] to [employer] (), PAF on AC and h/o VT, with recent admission for AICD firing, now presenting with dizziness and nausea/vomiting X 1 Day. He says that he was doing well earlier in the day and felt like his usual self. Normally he only walks from his living room to the front steps and this afternoon he decided to walk around a city block, which is significantly longer than he normally walks for. A while after that he developed nausea and vomited NBNB 3x and felt dizzy. ROS otherwise negative for f/c/ns/cp/abd p/diarrhea/consitpation, no syncope; no changes in medication, taking as prescribed. He says that his normal BP is around 90 systolic.    Patient initially presented to [facility], seen by cardiology. Labs noteable for pro bnp 3424, Trop T 0.08, CK 50, hyponatremia to 133 (from 135 two weeks prior), INR elevated at 4.36, Cr 2.07 from BL 0.9-1, BR 1.8. EKG showing NSR with first degree block. (2017 00:32)    Labs seem to be consistent with hemolysis --> increasing LDH.  MVO2: 55%    Review Of Systems:  Constitutional: [ ] Fever [ ] Chills [ ] Fatigue [ ] Weight change   HEENT: [ ] Blurred vision [ ] Eye Pain [ ] Headache [ ] Runny nose [ ] Sore Throat   Respiratory: [ ] Cough [ ] Wheezing [ ] Shortness of breath  Cardiovascular: [ ] Chest Pain [ ] Palpitations [ ] LOBATO [ ] PND [ ] Orthopnea  Gastrointestinal: [ ] Abdominal Pain [ ] Diarrhea [ ] Constipation [ ] Hemorrhoids [ ] Nausea [ ] Vomiting  Genitourinary: [ ] Nocturia [ ] Dysuria [ ] Incontinence  Extremities: [ ] Swelling [ ] Joint Pain  Neurologic: [ ] Focal deficit [ ] Paresthesias [ ] Syncope  Lymphatic: [ ] Swelling [ ] Lymphadenopathy   Skin: [ ] Rash [ ] Ecchymoses [ ] Wounds [ ] Lesions  Psychiatry: [ ] Depression [ ] Suicidal/Homicidal Ideation [ ] Anxiety [ ] Sleep Disturbances  [x ] 10 point review of systems is otherwise negative except as mentioned above            [ ]Unable to obtain    Medications:  mexiletine 150milliGRAM(s) Oral three times a day  docusate sodium 100milliGRAM(s) Oral three times a day  senna 2Tablet(s) Oral at bedtime  multivitamin/minerals 1Tablet(s) Oral daily  calcium carbonate  625 mG + Vitamin D (OsCal 250 + D) 1Tablet(s) Oral daily  folic acid 1milliGRAM(s) Oral daily  ascorbic acid 500milliGRAM(s) Oral daily  bisacodyl 5milliGRAM(s) Oral every 12 hours PRN  furosemide Infusion 10mG/Hr IV Continuous <Continuous>  milrinone Infusion 0.6MICROgram(s)/kG/Min IV Continuous <Continuous>  aluminum hydroxide/magnesium hydroxide/simethicone Suspension 30milliLiter(s) Oral every 6 hours PRN  phytonadione   Solution 10milliGRAM(s) Oral daily  freetext medication -  Infusion 49317Gbzf(s) IV Continuous <Continuous>  heparin  Infusion 100Unit(s)/Hr IV Continuous <Continuous>  potassium chloride  20 mEq/100 mL IVPB 20milliEquivalent(s) IV Intermittent every 2 hours    Vitals:  T(C): 36.6, Max: 36.6 (-11 @ 04:00)  HR: 118 (84 - 118)  BP: 99/78 (80/54 - 105/58)  BP(mean): 89 (63 - 94)  ABP: 96/70 (92/71 - 126/86)  ABP(mean): 77 (74 - 100)  RR: 15 (11 - 24)  SpO2: 95% (90% - 97%)  CO: 5.4 (4.1 - 7.4)  CI: 2.8 (2.1 - 3.9)  PA: 59/30 (49/27 - 72/39)  PA(mean): 42 (35 - 50)  PCWP: 21 (21 - 21)  SVR: 1408 (1295 - 1408)  PVR: 209 (144 - 209)    PCWP: 20 CVP 8    Daily     Daily Weight in k.2 (2017 01:00)    I&O's Summary  I & Os for 24h ending 2017 07:00  =============================================  IN: 1775 ml / OUT: 4530 ml / NET: -2755 ml    I & Os for current day (as of 2017 09:48)  =============================================  IN: 274.5 ml / OUT: 350 ml / NET: -75.5 ml    Physical Exam:  Appearance: [x ] Normal [x ] NAD  Eyes: [x ] PERRL [x ] EOMI  HENT: [x ]NC/AT Poor dentition  Cardiovascular: [ ] S1 [ ] S2 [ ] RRR [ ] No m/r/g [ ]No edema [ ] JVP  Procedural Access Site: [ ] No hematoma [ ] Non-tender to palpation [ ] 2+ pulse [ ] No bruit [ ] No Ecchymosis  Respiratory: [ ] Clear to auscultation bilaterally  Gastrointestinal: [ ] Soft [ ] Non-tender [ ] Non-distended [ ] BS+  Musculoskeletal: [ ] No clubbing [ ] No joint deformity   Neurologic: [ ] Non-focal  Lymphatic: [ ] No lymphadenopathy  Psychiatry: [ ] AAOx3 [ ] Mood & affect appropriate  Skin: [ ] No rashes [ ] No ecchymoses [ ] No cyanosis    Impella:  P8 --> P6 at around 5:30 am    Tele: Afib, PVCs    Labs:                        8.8    7.5   )-----------( 262      ( 2017 02:17 )             28.0         136  |  97  |  37<H>  ----------------------------<  94  3.8   |  24  |  1.42<H>    Ca    8.6      2017 04:27  Phos  3.3       Mg     2.4         TPro  6.7  /  Alb  3.2<L>  /  TBili  6.4<H>  /  DBili  x   /  AST  422<H>  /  ALT  136<H>  /  AlkPhos  87      PT/INR - ( 2017 02:17 )   PT: 18.6 sec;   INR: 1.69 ratio         PTT - ( 2017 02:17 )  PTT:53.4 sec    Oxygen Saturation, Mixed: 55 % ( @ 05:10)  Oxygen Saturation, Mixed: 48 % ( @ 04:21)  Oxygen Saturation, Mixed: 66 % (06-10 @ 21:16)  Oxygen Saturation, Mixed: 60 % (06-10 @ 13:21)  Oxygen Saturation, Mixed: 36 % (06-10 @ 04:24)  Oxygen Saturation, Mixed: 32 % ( @ 22:42)  Oxygen Saturation, Mixed: 88 % ( @ 21:56)  Oxygen Saturation, Mixed: 36 % ( @ 17:11)    Lactate Dehydrogenase, Serum: 1777 U/L ( @ 04:27)  Lactate Dehydrogenase, Serum: 1404 U/L (06-10 @ 21:30)  Lactate Dehydrogenase, Serum: 989 U/L (06-10 @ 14:20)  Lactate Dehydrogenase, Serum: 353 U/L ( @ 06:26)    Lactate, Blood: 1.7 mmol/L ( @ 06:27)  Lactate, Blood: 2.2 mmol/L ( 17:33)    -----------------------------------------------------------------  [ ] Congestive Heart Failure                  [ ] Acute                                                [ ] Acute on Chronic     [ ] Chronic  [ ] Diastolic (HFpEF)  [ ] Systolic (HFrEF)  [ ] Combined Systolic & Diastolic      [ ] ACC/AHA Stage: _____     [ ] NYHA Class: _____  -----------------------------------------------------------------  [ ] NORMAN                                                    [ ] CKD I  [ ] ATN                                                   [ ] CKD II  [ ] Other: _____                                       [ ] CKD III                                                                [ ] CKD IV                                                                [ ] CKD V                                                                [ ] ESRD  -----------------------------------------------------------------  Abnormal Nutritional Status:  [ ] Malnutrtion (see nutrition note)  [ ] Underweight: BMI < 19  [ ] Morbid Obeisty: BMI >/ 40  [ ] Cachexia  [ ] Other HPI:  66y/o M with advanced NICM BiV HFrEF 15-20% on a stable dose of milrinone at 3mcg/kg/min via PICC s/p AICD, with replacement from [employer] to [employer] (), PAF on AC and h/o VT, with recent admission for AICD firing, now presenting with dizziness and nausea/vomiting X 1 Day. He says that he was doing well earlier in the day and felt like his usual self. Normally he only walks from his living room to the front steps and this afternoon he decided to walk around a city block, which is significantly longer than he normally walks for. A while after that he developed nausea and vomited NBNB 3x and felt dizzy. ROS otherwise negative for f/c/ns/cp/abd p/diarrhea/consitpation, no syncope; no changes in medication, taking as prescribed. He says that his normal BP is around 90 systolic.    Patient initially presented to [facility], seen by cardiology. Labs noteable for pro bnp 3424, Trop T 0.08, CK 50, hyponatremia to 133 (from 135 two weeks prior), INR elevated at 4.36, Cr 2.07 from BL 0.9-1, BR 1.8. EKG showing NSR with first degree block. (2017 00:32)    Labs seem to be consistent with hemolysis --> increasing LDH.  MVO2: 55%    Review Of Systems:  Constitutional: [ ] Fever [ ] Chills [ ] Fatigue [ ] Weight change   HEENT: [ ] Blurred vision [ ] Eye Pain [ ] Headache [ ] Runny nose [ ] Sore Throat   Respiratory: [ ] Cough [ ] Wheezing [ ] Shortness of breath  Cardiovascular: [ ] Chest Pain [ ] Palpitations [ ] LOBATO [ ] PND [ ] Orthopnea  Gastrointestinal: [ ] Abdominal Pain [ ] Diarrhea [ ] Constipation [ ] Hemorrhoids [ ] Nausea [ ] Vomiting  Genitourinary: [ ] Nocturia [ ] Dysuria [ ] Incontinence  Extremities: [ ] Swelling [ ] Joint Pain  Neurologic: [ ] Focal deficit [ ] Paresthesias [ ] Syncope  Lymphatic: [ ] Swelling [ ] Lymphadenopathy   Skin: [ ] Rash [ ] Ecchymoses [ ] Wounds [ ] Lesions  Psychiatry: [ ] Depression [ ] Suicidal/Homicidal Ideation [ ] Anxiety [ ] Sleep Disturbances  [x ] 10 point review of systems is otherwise negative except as mentioned above            [ ]Unable to obtain    Medications:  mexiletine 150milliGRAM(s) Oral three times a day  docusate sodium 100milliGRAM(s) Oral three times a day  senna 2Tablet(s) Oral at bedtime  multivitamin/minerals 1Tablet(s) Oral daily  calcium carbonate  625 mG + Vitamin D (OsCal 250 + D) 1Tablet(s) Oral daily  folic acid 1milliGRAM(s) Oral daily  ascorbic acid 500milliGRAM(s) Oral daily  bisacodyl 5milliGRAM(s) Oral every 12 hours PRN  furosemide Infusion 10mG/Hr IV Continuous <Continuous>  milrinone Infusion 0.6MICROgram(s)/kG/Min IV Continuous <Continuous>  aluminum hydroxide/magnesium hydroxide/simethicone Suspension 30milliLiter(s) Oral every 6 hours PRN  phytonadione   Solution 10milliGRAM(s) Oral daily  freetext medication -  Infusion 21612Mqqq(s) IV Continuous <Continuous>  heparin  Infusion 100Unit(s)/Hr IV Continuous <Continuous>  potassium chloride  20 mEq/100 mL IVPB 20milliEquivalent(s) IV Intermittent every 2 hours    Vitals:  T(C): 36.6, Max: 36.6 (-11 @ 04:00)  HR: 118 (84 - 118)  BP: 99/78 (80/54 - 105/58)  BP(mean): 89 (63 - 94)  ABP: 96/70 (92/71 - 126/86)  ABP(mean): 77 (74 - 100)  RR: 15 (11 - 24)  SpO2: 95% (90% - 97%)  CO: 5.4 (4.1 - 7.4)  CI: 2.8 (2.1 - 3.9)  PA: 59/30 (49/27 - 72/39)  PA(mean): 42 (35 - 50)  PCWP: 21 (21 - 21)  SVR: 1408 (1295 - 1408)  PVR: 209 (144 - 209)    PCWP: 20 CVP 8    Daily     Daily Weight in k.2 (2017 01:00)    I&O's Summary  I & Os for 24h ending 2017 07:00  =============================================  IN: 1775 ml / OUT: 4530 ml / NET: -2755 ml    I & Os for current day (as of 2017 09:48)  =============================================  IN: 274.5 ml / OUT: 350 ml / NET: -75.5 ml    Physical Exam:  Appearance: [x ] Normal [x ] NAD  Eyes: [x ] PERRL [x ] EOMI  HENT: [x ]NC/AT Poor dentition  Cardiovascular: [x ] S1 [x ] S2 irregularly irregular, no JVP  Respiratory: [x ] Clear to auscultation bilaterally  Gastrointestinal: [ x] Soft [x ] Non-tender [ x] Non-distended [x ] BS+  Musculoskeletal: [ x] No clubbing [x ] No joint deformity   Neurologic: [x ] Non-focal  Lymphatic: [x ] No lymphadenopathy  Psychiatry: [x ] AAOx3 [x ] Mood & affect appropriate  Skin: [ x] No rashes [x ] No ecchymoses [x ] No cyanosis    Impella:  P8 --> P6 at around 5:30 am    Tele: Sarah, PVCs    Labs:                        8.8    7.5   )-----------( 262      ( 2017 02:17 )             28.0     06-    136  |  97  |  37<H>  ----------------------------<  94  3.8   |  24  |  1.42<H>    Ca    8.6      2017 04:27  Phos  3.3     06-11  Mg     2.4     06-11    TPro  6.7  /  Alb  3.2<L>  /  TBili  6.4<H>  /  DBili  x   /  AST  422<H>  /  ALT  136<H>  /  AlkPhos  87  06-11    PT/INR - ( 2017 02:17 )   PT: 18.6 sec;   INR: 1.69 ratio         PTT - ( 2017 02:17 )  PTT:53.4 sec    Oxygen Saturation, Mixed: 55 % ( @ 05:10)  Oxygen Saturation, Mixed: 48 % ( @ 04:21)  Oxygen Saturation, Mixed: 66 % (06-10 @ 21:16)  Oxygen Saturation, Mixed: 60 % (06-10 @ 13:21)  Oxygen Saturation, Mixed: 36 % (06-10 @ 04:24)  Oxygen Saturation, Mixed: 32 % ( @ 22:42)  Oxygen Saturation, Mixed: 88 % ( @ 21:56)  Oxygen Saturation, Mixed: 36 % ( @ 17:11)    Lactate Dehydrogenase, Serum: 1777 U/L ( @ 04:27)  Lactate Dehydrogenase, Serum: 1404 U/L (06-10 @ 21:30)  Lactate Dehydrogenase, Serum: 989 U/L (06-10 @ 14:20)  Lactate Dehydrogenase, Serum: 353 U/L ( @ 06:26)    Lactate, Blood: 1.7 mmol/L ( @ 06:27)  Lactate, Blood: 2.2 mmol/L ( @ 17:33)    -----------------------------------------------------------------  [ ] Congestive Heart Failure                  [ ] Acute                                                [ ] Acute on Chronic     [ ] Chronic  [ ] Diastolic (HFpEF)  [ ] Systolic (HFrEF)  [ ] Combined Systolic & Diastolic      [ ] ACC/AHA Stage: _____     [ ] NYHA Class: _____  -----------------------------------------------------------------  [ ] NORMAN                                                    [ ] CKD I  [ ] ATN                                                   [ ] CKD II  [ ] Other: _____                                       [ ] CKD III                                                                [ ] CKD IV                                                                [ ] CKD V                                                                [ ] ESRD  -----------------------------------------------------------------  Abnormal Nutritional Status:  [ ] Malnutrtion (see nutrition note)  [ ] Underweight: BMI < 19  [ ] Morbid Obeisty: BMI >/ 40  [ ] Cachexia  [ ] Other

## 2017-06-11 NOTE — PROGRESS NOTE ADULT - SUBJECTIVE AND OBJECTIVE BOX
CCU MIDNIGHT ROUNDS    HPI:  66y/o M with advanced NICM BiV HFrEF 15-20% on Milrinone at 3mcg/kg/min via PICC s/p AICD ( [employer] ) , PAF on AC and h/o VT, w/ recent admission for AICD firing, now p/w dizziness/nausea/vomiting X 1 Day.Pt diagnosed with AdHF c/b coagulapathy.    nterval History:    Vital Signs Last 24 Hrs  T(C): 36.6, Max: 36.7 (06-11 @ 17:00)  T(F): 97.8, Max: 98.1 (06-11 @ 17:00)  HR: 120 (88 - 122)  BP: 93/68 (85/67 - 103/74)  BP(mean): 84 (73 - 94)  RR: 15 (11 - 24)  SpO2: 97% (92% - 98%)        Adult Advanced Hemodynamics Last 24 Hrs  CVP(mm Hg): 7 (2 - 17)  CVP(cm H2O): --  CO: 5.4 (4.2 - 7.4)  CI: 2.8 (2.2 - 3.9)  PA: 51/23 (46/19 - 66/35)  PA(mean): 33 (28 - 204)  PCWP: --  SVR: 1408 (1295 - 1408)  SVRI: 2654 (2440 - 2654)  PVR: 209 (144 - 209)  PVRI: 395 (395 - 741)    I&O's Summary  I & Os for 24h ending 11 Jun 2017 07:00  =============================================  IN: 1775.8 ml / OUT: 4530 ml / NET: -2754.2 ml    I & Os for current day (as of 11 Jun 2017 20:38)  =============================================  IN: 682.2 ml / OUT: 3350 ml / NET: -2667.8 ml      HEALTH ISSUES - PROBLEM Dx:  Gastric ulcer: Gastric ulcer  Tooth infection: Tooth infection  Other hypervolemia: Other hypervolemia  Anemia: Anemia  Hyponatremia: Hyponatremia  Hypervolemia, unspecified hypervolemia type: Hypervolemia, unspecified hypervolemia type  Metabolic acidosis: Metabolic acidosis  Total bilirubin, elevated: Total bilirubin, elevated  Transaminitis: Transaminitis  Supratherapeutic INR: Supratherapeutic INR  NORMAN (acute kidney injury): NORMAN (acute kidney injury)  Hyperbilirubinemia: Hyperbilirubinemia  Need for prophylactic measure: Need for prophylactic measure  Arrhythmia, ventricular: Arrhythmia, ventricular  PAF (paroxysmal atrial fibrillation): PAF (paroxysmal atrial fibrillation)  Acute kidney injury: Acute kidney injury  Acute decompensated heart failure: Acute decompensated heart failure        HEALTH ISSUES - R/O PROBLEM Dx: CCU MIDNIGHT ROUNDS    HPI:  66y/o M with advanced NICM BiV HFrEF 15-20% on Milrinone at 3mcg/kg/min via PICC s/p AICD ( [employer] ) , PAF on AC and h/o VT, w/ recent admission for AICD firing, n 6/2 p/w dizziness/nausea/vomiting X 1 Day. Pt diagnosed with AdHF c/b coagulapathy. His hospital course was compicated by [facility]. He was treated with inotropic assisted diuresis without improvement in kidney/cardiac/ or hepatic function.     Interval History:      Plan for right heart cath today when INR reverses. Given K-Centra for reversal of INR.  Nephrology recomemndatiosn: Advice to taper lasix drip, check renal and bladder sonogram. Monitor BMP, strict I/O, avoid nephrotoxics, NSAIDs, RCA.  Per Heme: Patient states that he takes Xarelto every other day at home.  No h/o hematologic d/o. Do mixing studies. r/o DIC.   Order anemia workup up, hemolysis labs.   6/8: patient complained of typical epigastric burning pain, relieved with maalox. Only net negative 200cc. pending right heart cath to evaluate for LVAD.   o/n: Continues to be net negative. KEVIN.  O/N: patient went for dental evaluation. He has a chronic tooth infeciton. Pending recommendations. Heart Failure team came by, is worried about poor cardiac output. Talked to Ravinder, will do impella tomorrow.  6/9: s/p RHC.  Lasix increased to 20 mg/hr & Milrinone to 0.6 mcg/kg/min  o/n: OLIVIA CO 3.9, CI 2.075. Mixed SaO2 still low at midnight (30s). H&H drop on m/n labs. No signs of bleeding will recheck in AM. OLIVIA CI dropped overnight from 2.1 to 1.85. OLIVIA CO dropped from 3.9 to 3.5. This drop is likely 2/2 improved H&H on AM labs. Hemodilution CO and CI were unchanged from 11PM to 5AM. Mixed O2sat inc to 38 from 32.   6/10: No acute events overnight. s/p Hixson Cassidy yesterdya and now patient s/p Impella today due to worsening HF. MVO2 low. Continue Lasix and Milrinone at current rate pending further HF recommendations. Possible plan for Impella ?Monday.  Per GI and Dental no contraindications for LVAD. Pending F7 per Heme recs. Cr downtrending. Will monitor.   WIll place Ralph  and Ansley today.  Per HF, need INR to be less than 1.5. Will recheck in PM. If it is not below 2, consider K-Centra however ask Hematology if it would interfere with A/C when he does for LVAD on Monday.    6/11 CO/CI increased with impella, heparin purge fluid restarted for ptt 55   sig increased UOP, lasix gtt dec to 10   systemic heparin started @ 100 un/hr to maintain ptt @ 50-70 (getting 700-750 un via purge system)   Hemolysis labs increasingly positive despited low potassium. Will call Impella rep. - rec dec to p6, f/u labs, TTE ordered to eval position for AM   6/11: hemolysis labs stable w/ good CI, CO on p6, will drop to p4. If continues to hemolyze may go for LVAD, -300 cc/hr on lasix gtt 10, trending lytes, pt aysmptomatic, impella repositioned and p increased back to 8     Vital Signs Last 24 Hrs  T(C): 36.6, Max: 36.7 (06-11 @ 17:00)  T(F): 97.8, Max: 98.1 (06-11 @ 17:00)  HR: 120 (88 - 122)  BP: 93/68 (85/67 - 103/74)  BP(mean): 84 (73 - 94)  RR: 15 (11 - 24)  SpO2: 97% (92% - 98%)        Adult Advanced Hemodynamics Last 24 Hrs  CVP(mm Hg): 7 (2 - 17)  CVP(cm H2O): --  CO: 5.4 (4.2 - 7.4)  CI: 2.8 (2.2 - 3.9)  PA: 51/23 (46/19 - 66/35)  PA(mean): 33 (28 - 204)  PCWP: --  SVR: 1408 (1295 - 1408)  SVRI: 2654 (2440 - 2654)  PVR: 209 (144 - 209)  PVRI: 395 (474 - 364)    I&O's Summary  I & Os for 24h ending 11 Jun 2017 07:00  =============================================  IN: 1775.8 ml / OUT: 4530 ml / NET: -2754.2 ml    I & Os for current day (as of 11 Jun 2017 20:38)  =============================================  IN: 682.2 ml / OUT: 3350 ml / NET: -2667.8 ml      HEALTH ISSUES - PROBLEM Dx:  Gastric ulcer: Gastric ulcer  Tooth infection: Tooth infection  Other hypervolemia: Other hypervolemia  Anemia: Anemia  Hyponatremia: Hyponatremia  Hypervolemia, unspecified hypervolemia type: Hypervolemia, unspecified hypervolemia type  Metabolic acidosis: Metabolic acidosis  Total bilirubin, elevated: Total bilirubin, elevated  Transaminitis: Transaminitis  Supratherapeutic INR: Supratherapeutic INR  NORMAN (acute kidney injury): NORMAN (acute kidney injury)  Hyperbilirubinemia: Hyperbilirubinemia  Need for prophylactic measure: Need for prophylactic measure  Arrhythmia, ventricular: Arrhythmia, ventricular  PAF (paroxysmal atrial fibrillation): PAF (paroxysmal atrial fibrillation)  Acute kidney injury: Acute kidney injury  Acute decompensated heart failure: Acute decompensated heart failure        HEALTH ISSUES - R/O PROBLEM Dx: CCU MIDNIGHT ROUNDS    HPI:  68y/o M with advanced NICM BiV HFrEF 15-20% on Milrinone at 3mcg/kg/min via PICC s/p AICD ( [employer] ) , PAF on AC and h/o VT, w/ recent admission for AICD firing, n 6/2 p/w dizziness/nausea/vomiting X 1 Day. Pt diagnosed with AdHF c/b coagulapathy. His hospital course was compicated by [facility]. He was treated with inotropic assisted diuresis without improvement in kidney/cardiac/ or hepatic function. On 6/11 patient had impella placed for heart failure support with plan for LVAD placement on 6/12.    Interval History: Patient has evidence of hemolysis on P8 Impella with LDH trending upwards and haptoglobin trending down. In addition, patient CO/CI was high on P8 so impella was changed to P6 at 8 pm. Will repeat labs at 10pm, otherwise patient is hemodynamically stable without complaints.     ICU Vital Signs Last 24 Hrs  T(C): 36.6, Max: 36.7 (06-11 @ 17:00)  T(F): 97.8, Max: 98.1 (06-11 @ 17:00)  HR: 120 (94 - 122)  BP: 93/68 (85/67 - 103/74)  BP(mean): 84 (73 - 94)  ABP: 92/64 (85/52 - 122/81)  ABP(mean): 77 (67 - 100)  RR: 11 (11 - 24)  SpO2: 95% (92% - 98%)    Adult Advanced Hemodynamics Last 24 Hrs  CVP(mm Hg): 7 (2 - 17)  CO: 5.4 (4.2 - 7.4)  CI: 2.8 (2.2 - 3.9)  PA: 51/23 (46/19 - 66/35)  PA(mean): 33 (28 - 204)  SVR: 1408 (1295 - 1408)  SVRI: 2654 (2440 - 2654)  PVR: 209 (144 - 209)  PVRI: 395 (395 - 741)    6/11/2017: Impella P8 , Impella flow 3.3 L  CVP 10  Marino CO 9.3  Marino CI 4.9  Hapto < 20  LDH 1959      I&O's Summary  I & Os for 24h ending 11 Jun 2017 07:00  =============================================  IN: 1775.8 ml / OUT: 4530 ml / NET: -2754.2 ml    I & Os for current day (as of 11 Jun 2017 20:38)  =============================================  IN: 682.2 ml / OUT: 3350 ml / NET: -2667.8 ml      HEALTH ISSUES - PROBLEM Dx:  Gastric ulcer: Gastric ulcer  Tooth infection: Tooth infection  Other hypervolemia: Other hypervolemia  Anemia: Anemia  Hyponatremia: Hyponatremia  Hypervolemia, unspecified hypervolemia type: Hypervolemia, unspecified hypervolemia type  Metabolic acidosis: Metabolic acidosis  Total bilirubin, elevated: Total bilirubin, elevated  Transaminitis: Transaminitis  Supratherapeutic INR: Supratherapeutic INR  NORMAN (acute kidney injury): NORMAN (acute kidney injury)  Hyperbilirubinemia: Hyperbilirubinemia  Need for prophylactic measure: Need for prophylactic measure  Arrhythmia, ventricular: Arrhythmia, ventricular  PAF (paroxysmal atrial fibrillation): PAF (paroxysmal atrial fibrillation)  Acute kidney injury: Acute kidney injury  Acute decompensated heart failure: Acute decompensated heart failure        HEALTH ISSUES - R/O PROBLEM Dx: CCU MIDNIGHT ROUNDS    HPI:  68y/o M with advanced NICM BiV HFrEF 15-20% on Milrinone at 3mcg/kg/min via PICC s/p AICD ( [employer] ) , PAF on AC and h/o VT, w/ recent admission for AICD firing, n 6/2 p/w dizziness/nausea/vomiting X 1 Day. Pt diagnosed with AdHF c/b coagulapathy. His hospital course was compicated by [facility]. He was treated with inotropic assisted diuresis without improvement in kidney/cardiac/ or hepatic function. On 6/11 patient had impella placed for heart failure support with plan for LVAD placement on 6/12.    Interval History: Patient has evidence of hemolysis on P8 Impella with LDH trending upwards and haptoglobin trending down. In addition, patient CO/CI was high on P8 so impella was changed to P6 at 8 pm. Will repeat labs at 10pm, otherwise patient is hemodynamically stable without complaints.     ICU Vital Signs Last 24 Hrs  T(C): 36.6, Max: 36.7 (06-11 @ 17:00)  T(F): 97.8, Max: 98.1 (06-11 @ 17:00)  HR: 120 (94 - 122)  BP: 93/68 (85/67 - 103/74)  BP(mean): 84 (73 - 94)  ABP: 92/64 (85/52 - 122/81)  ABP(mean): 77 (67 - 100)  RR: 11 (11 - 24)  SpO2: 95% (92% - 98%)    Adult Advanced Hemodynamics Last 24 Hrs  CVP(mm Hg): 7 (2 - 17)  CO: 5.4 (4.2 - 7.4)  CI: 2.8 (2.2 - 3.9)  PA: 51/23 (46/19 - 66/35)  PA(mean): 33 (28 - 204)  SVR: 1408 (1295 - 1408)  SVRI: 2654 (2440 - 2654)  PVR: 209 (144 - 209)  PVRI: 395 (395 - 741)    6/11/2017: Impella P8 , Impella flow 3.3 L  CVP 10  Marino CO 9.3  Marino CI 4.9  Hapto < 20  LDH 1959  Mixed O2: 70      I&O's Summary  I & Os for 24h ending 11 Jun 2017 07:00  =============================================  IN: 1775.8 ml / OUT: 4530 ml / NET: -2754.2 ml    I & Os for current day (as of 11 Jun 2017 20:38)  =============================================  IN: 682.2 ml / OUT: 3350 ml / NET: -2667.8 ml    INR: 1.69    HEALTH ISSUES - PROBLEM Dx:  AdHF s/p Impella awaiting LVAD in AM: Patient Impella support turned to P6. On P6   Marino CO: 5.3   Marino CI: 2.8  Mixed O2: 54   Continue to monitor hemodynamics q 4, continue lasix and Milrinone gtts. Patient currently -2/6 L and diuresing well. NPO after midnight for LVAD. ECHO in AM for Impella placement   Hemolysis: CBC remains stable, continue to monitor LDH, hapto.  Supratherapeutic INR: Continue to monitor INR for goal INR < 1.5 for LVAD. May need Vit K tonight.   NORMAN (acute kidney injury): Cr improving on lasix gtt.   WCT: Continue Mexiltiine, no ecotopy overnight

## 2017-06-12 ENCOUNTER — APPOINTMENT (OUTPATIENT)
Dept: CARDIOTHORACIC SURGERY | Facility: HOSPITAL | Age: 67
End: 2017-06-12

## 2017-06-12 ENCOUNTER — RESULT REVIEW (OUTPATIENT)
Age: 67
End: 2017-06-12

## 2017-06-12 LAB
ALBUMIN SERPL ELPH-MCNC: 3.2 G/DL — LOW (ref 3.3–5)
ALBUMIN SERPL ELPH-MCNC: 3.3 G/DL — SIGNIFICANT CHANGE UP (ref 3.3–5)
ALBUMIN SERPL ELPH-MCNC: 3.4 G/DL — SIGNIFICANT CHANGE UP (ref 3.3–5)
ALP SERPL-CCNC: 100 U/L — SIGNIFICANT CHANGE UP (ref 40–120)
ALP SERPL-CCNC: 63 U/L — SIGNIFICANT CHANGE UP (ref 40–120)
ALP SERPL-CCNC: 65 U/L — SIGNIFICANT CHANGE UP (ref 40–120)
ALT FLD-CCNC: 111 U/L RC — HIGH (ref 10–45)
ALT FLD-CCNC: 66 U/L RC — HIGH (ref 10–45)
ALT FLD-CCNC: 70 U/L RC — HIGH (ref 10–45)
ANION GAP SERPL CALC-SCNC: 13 MMOL/L — SIGNIFICANT CHANGE UP (ref 5–17)
ANION GAP SERPL CALC-SCNC: 18 MMOL/L — HIGH (ref 5–17)
ANION GAP SERPL CALC-SCNC: 22 MMOL/L — HIGH (ref 5–17)
APTT BLD: 40.5 SEC — HIGH (ref 27.5–37.4)
APTT BLD: 60.5 SEC — HIGH (ref 27.5–37.4)
AST SERPL-CCNC: 216 U/L — HIGH (ref 10–40)
AST SERPL-CCNC: 222 U/L — HIGH (ref 10–40)
AST SERPL-CCNC: 387 U/L — HIGH (ref 10–40)
BASE EXCESS BLDMV CALC-SCNC: -6.7 MMOL/L — LOW (ref -3–3)
BASE EXCESS BLDMV CALC-SCNC: 3 MMOL/L — SIGNIFICANT CHANGE UP (ref -3–3)
BILIRUB SERPL-MCNC: 5.8 MG/DL — HIGH (ref 0.2–1.2)
BILIRUB SERPL-MCNC: 6.3 MG/DL — HIGH (ref 0.2–1.2)
BILIRUB SERPL-MCNC: 6.6 MG/DL — HIGH (ref 0.2–1.2)
BUN SERPL-MCNC: 23 MG/DL — SIGNIFICANT CHANGE UP (ref 7–23)
BUN SERPL-MCNC: 24 MG/DL — HIGH (ref 7–23)
BUN SERPL-MCNC: 26 MG/DL — HIGH (ref 7–23)
CALCIUM SERPL-MCNC: 8.6 MG/DL — SIGNIFICANT CHANGE UP (ref 8.4–10.5)
CALCIUM SERPL-MCNC: 8.8 MG/DL — SIGNIFICANT CHANGE UP (ref 8.4–10.5)
CALCIUM SERPL-MCNC: 8.9 MG/DL — SIGNIFICANT CHANGE UP (ref 8.4–10.5)
CHLORIDE SERPL-SCNC: 101 MMOL/L — SIGNIFICANT CHANGE UP (ref 96–108)
CHLORIDE SERPL-SCNC: 97 MMOL/L — SIGNIFICANT CHANGE UP (ref 96–108)
CHLORIDE SERPL-SCNC: 99 MMOL/L — SIGNIFICANT CHANGE UP (ref 96–108)
CK MB BLD-MCNC: 6.8 % — HIGH (ref 0–3.5)
CK MB CFR SERPL CALC: 31.7 NG/ML — HIGH (ref 0–6.7)
CK SERPL-CCNC: 466 U/L — HIGH (ref 30–200)
CO2 BLDMV-SCNC: 21 MMOL/L — SIGNIFICANT CHANGE UP (ref 21–29)
CO2 BLDMV-SCNC: 28 MMOL/L — SIGNIFICANT CHANGE UP (ref 21–29)
CO2 SERPL-SCNC: 16 MMOL/L — LOW (ref 22–31)
CO2 SERPL-SCNC: 19 MMOL/L — LOW (ref 22–31)
CO2 SERPL-SCNC: 23 MMOL/L — SIGNIFICANT CHANGE UP (ref 22–31)
CREAT SERPL-MCNC: 1.42 MG/DL — HIGH (ref 0.5–1.3)
CREAT SERPL-MCNC: 1.44 MG/DL — HIGH (ref 0.5–1.3)
CREAT SERPL-MCNC: 1.66 MG/DL — HIGH (ref 0.5–1.3)
FACT VII ACT/NOR PPP: 29 % — LOW (ref 50–165)
FACT X ACT/NOR PPP: 53 % — LOW (ref 70–170)
FIBRINOGEN PPP-MCNC: 253 MG/DL — LOW (ref 310–510)
GAS PNL BLDA: SIGNIFICANT CHANGE UP
GAS PNL BLDMV: SIGNIFICANT CHANGE UP
GAS PNL BLDMV: SIGNIFICANT CHANGE UP
GLUCOSE SERPL-MCNC: 148 MG/DL — HIGH (ref 70–99)
GLUCOSE SERPL-MCNC: 195 MG/DL — HIGH (ref 70–99)
GLUCOSE SERPL-MCNC: 91 MG/DL — SIGNIFICANT CHANGE UP (ref 70–99)
HAPTOGLOB SERPL-MCNC: <20 MG/DL — LOW (ref 34–200)
HCO3 BLDMV-SCNC: 19 MMOL/L — LOW (ref 20–28)
HCO3 BLDMV-SCNC: 27 MMOL/L — SIGNIFICANT CHANGE UP (ref 20–28)
HCT VFR BLD CALC: 29.3 % — LOW (ref 39–50)
HCT VFR BLD CALC: 29.3 % — LOW (ref 39–50)
HCT VFR BLD CALC: 29.8 % — LOW (ref 39–50)
HGB BLD-MCNC: 8.9 G/DL — LOW (ref 13–17)
HGB BLD-MCNC: 9.2 G/DL — LOW (ref 13–17)
HGB BLD-MCNC: 9.6 G/DL — LOW (ref 13–17)
HOROWITZ INDEX BLDMV+IHG-RTO: 100 — SIGNIFICANT CHANGE UP
HOROWITZ INDEX BLDMV+IHG-RTO: 21 — SIGNIFICANT CHANGE UP
INR BLD: 1.45 RATIO — HIGH (ref 0.88–1.16)
INR BLD: 1.74 RATIO — HIGH (ref 0.88–1.16)
LDH SERPL L TO P-CCNC: 2083 U/L — HIGH (ref 50–242)
LYMPHOCYTES # BLD AUTO: 0.8 K/UL — LOW (ref 1–3.3)
LYMPHOCYTES # BLD AUTO: 2 % — LOW (ref 13–44)
MAGNESIUM SERPL-MCNC: 2.7 MG/DL — HIGH (ref 1.6–2.6)
MCHC RBC-ENTMCNC: 26.3 PG — LOW (ref 27–34)
MCHC RBC-ENTMCNC: 27.6 PG — SIGNIFICANT CHANGE UP (ref 27–34)
MCHC RBC-ENTMCNC: 27.9 PG — SIGNIFICANT CHANGE UP (ref 27–34)
MCHC RBC-ENTMCNC: 30.6 GM/DL — LOW (ref 32–36)
MCHC RBC-ENTMCNC: 31.5 GM/DL — LOW (ref 32–36)
MCHC RBC-ENTMCNC: 32 GM/DL — SIGNIFICANT CHANGE UP (ref 32–36)
MCV RBC AUTO: 86.2 FL — SIGNIFICANT CHANGE UP (ref 80–100)
MCV RBC AUTO: 87.2 FL — SIGNIFICANT CHANGE UP (ref 80–100)
MCV RBC AUTO: 87.7 FL — SIGNIFICANT CHANGE UP (ref 80–100)
MONOCYTES # BLD AUTO: 1.1 K/UL — HIGH (ref 0–0.9)
MONOCYTES NFR BLD AUTO: 1 % — LOW (ref 2–14)
NEUTROPHILS # BLD AUTO: 21.5 K/UL — HIGH (ref 1.8–7.4)
NEUTROPHILS NFR BLD AUTO: 95 % — HIGH (ref 43–77)
O2 CT VFR BLD CALC: 32 MMHG — SIGNIFICANT CHANGE UP (ref 30–65)
O2 CT VFR BLD CALC: 62 MMHG — SIGNIFICANT CHANGE UP (ref 30–65)
PCO2 BLDMV: 38 MMHG — SIGNIFICANT CHANGE UP (ref 30–65)
PCO2 BLDMV: 44 MMHG — SIGNIFICANT CHANGE UP (ref 30–65)
PH BLDMV: 7.26 — LOW (ref 7.32–7.45)
PH BLDMV: 7.46 — HIGH (ref 7.32–7.45)
PHOSPHATE SERPL-MCNC: 2 MG/DL — LOW (ref 2.5–4.5)
PLATELET # BLD AUTO: 110 K/UL — LOW (ref 150–400)
PLATELET # BLD AUTO: 154 K/UL — SIGNIFICANT CHANGE UP (ref 150–400)
PLATELET # BLD AUTO: 197 K/UL — SIGNIFICANT CHANGE UP (ref 150–400)
POTASSIUM SERPL-MCNC: 3.7 MMOL/L — SIGNIFICANT CHANGE UP (ref 3.5–5.3)
POTASSIUM SERPL-MCNC: 4.1 MMOL/L — SIGNIFICANT CHANGE UP (ref 3.5–5.3)
POTASSIUM SERPL-MCNC: 4.7 MMOL/L — SIGNIFICANT CHANGE UP (ref 3.5–5.3)
POTASSIUM SERPL-SCNC: 3.7 MMOL/L — SIGNIFICANT CHANGE UP (ref 3.5–5.3)
POTASSIUM SERPL-SCNC: 4.1 MMOL/L — SIGNIFICANT CHANGE UP (ref 3.5–5.3)
POTASSIUM SERPL-SCNC: 4.7 MMOL/L — SIGNIFICANT CHANGE UP (ref 3.5–5.3)
PREALB SERPL-MCNC: 8 MG/DL — LOW (ref 18–45)
PROT SERPL-MCNC: 5.5 G/DL — LOW (ref 6–8.3)
PROT SERPL-MCNC: 6.1 G/DL — SIGNIFICANT CHANGE UP (ref 6–8.3)
PROT SERPL-MCNC: 6.8 G/DL — SIGNIFICANT CHANGE UP (ref 6–8.3)
PROTHROM AB SERPL-ACNC: 15.8 SEC — HIGH (ref 9.8–12.7)
PROTHROM AB SERPL-ACNC: 19.2 SEC — HIGH (ref 9.8–12.7)
RBC # BLD: 3.34 M/UL — LOW (ref 4.2–5.8)
RBC # BLD: 3.4 M/UL — LOW (ref 4.2–5.8)
RBC # BLD: 3.42 M/UL — LOW (ref 4.2–5.8)
RBC # FLD: 18.2 % — HIGH (ref 10.3–14.5)
RBC # FLD: 18.5 % — HIGH (ref 10.3–14.5)
RBC # FLD: 21.9 % — HIGH (ref 10.3–14.5)
SAO2 % BLDMV: 58 % — LOW (ref 60–90)
SAO2 % BLDMV: 88 % — SIGNIFICANT CHANGE UP (ref 60–90)
SODIUM SERPL-SCNC: 135 MMOL/L — SIGNIFICANT CHANGE UP (ref 135–145)
SODIUM SERPL-SCNC: 135 MMOL/L — SIGNIFICANT CHANGE UP (ref 135–145)
SODIUM SERPL-SCNC: 138 MMOL/L — SIGNIFICANT CHANGE UP (ref 135–145)
TROPONIN T SERPL-MCNC: 0.75 NG/ML — HIGH (ref 0–0.06)
WBC # BLD: 11.6 K/UL — HIGH (ref 3.8–10.5)
WBC # BLD: 16.1 K/UL — HIGH (ref 3.8–10.5)
WBC # BLD: 23.5 K/UL — HIGH (ref 3.8–10.5)
WBC # FLD AUTO: 11.6 K/UL — HIGH (ref 3.8–10.5)
WBC # FLD AUTO: 16.1 K/UL — HIGH (ref 3.8–10.5)
WBC # FLD AUTO: 23.5 K/UL — HIGH (ref 3.8–10.5)

## 2017-06-12 PROCEDURE — 99233 SBSQ HOSP IP/OBS HIGH 50: CPT | Mod: GC

## 2017-06-12 PROCEDURE — 93010 ELECTROCARDIOGRAM REPORT: CPT

## 2017-06-12 PROCEDURE — 71010: CPT | Mod: 26

## 2017-06-12 PROCEDURE — 93287 PERI-PX DEVICE EVAL & PRGR: CPT | Mod: 26

## 2017-06-12 PROCEDURE — 88307 TISSUE EXAM BY PATHOLOGIST: CPT | Mod: 26

## 2017-06-12 RX ORDER — GLUCAGON INJECTION, SOLUTION 0.5 MG/.1ML
1 INJECTION, SOLUTION SUBCUTANEOUS ONCE
Qty: 0 | Refills: 0 | Status: DISCONTINUED | OUTPATIENT
Start: 2017-06-12 | End: 2017-06-15

## 2017-06-12 RX ORDER — METOCLOPRAMIDE HCL 10 MG
5 TABLET ORAL EVERY 8 HOURS
Qty: 0 | Refills: 0 | Status: COMPLETED | OUTPATIENT
Start: 2017-06-12 | End: 2017-06-14

## 2017-06-12 RX ORDER — MAGNESIUM SULFATE 500 MG/ML
1 VIAL (ML) INJECTION ONCE
Qty: 0 | Refills: 0 | Status: COMPLETED | OUTPATIENT
Start: 2017-06-12 | End: 2017-06-12

## 2017-06-12 RX ORDER — EPINEPHRINE 0.3 MG/.3ML
0.06 INJECTION INTRAMUSCULAR; SUBCUTANEOUS
Qty: 4 | Refills: 0 | Status: DISCONTINUED | OUTPATIENT
Start: 2017-06-12 | End: 2017-06-13

## 2017-06-12 RX ORDER — AMIODARONE HYDROCHLORIDE 400 MG/1
150 TABLET ORAL ONCE
Qty: 0 | Refills: 0 | Status: COMPLETED | OUTPATIENT
Start: 2017-06-12 | End: 2017-06-12

## 2017-06-12 RX ORDER — VASOPRESSIN 20 [USP'U]/ML
0.07 INJECTION INTRAVENOUS
Qty: 100 | Refills: 0 | Status: DISCONTINUED | OUTPATIENT
Start: 2017-06-12 | End: 2017-06-15

## 2017-06-12 RX ORDER — DEXTROSE 50 % IN WATER 50 %
25 SYRINGE (ML) INTRAVENOUS ONCE
Qty: 0 | Refills: 0 | Status: DISCONTINUED | OUTPATIENT
Start: 2017-06-12 | End: 2017-06-29

## 2017-06-12 RX ORDER — FENTANYL CITRATE 50 UG/ML
50 INJECTION INTRAVENOUS ONCE
Qty: 0 | Refills: 0 | Status: DISCONTINUED | OUTPATIENT
Start: 2017-06-12 | End: 2017-06-12

## 2017-06-12 RX ORDER — HYDROMORPHONE HYDROCHLORIDE 2 MG/ML
0.5 INJECTION INTRAMUSCULAR; INTRAVENOUS; SUBCUTANEOUS ONCE
Qty: 0 | Refills: 0 | Status: DISCONTINUED | OUTPATIENT
Start: 2017-06-12 | End: 2017-06-12

## 2017-06-12 RX ORDER — POTASSIUM CHLORIDE 20 MEQ
10 PACKET (EA) ORAL ONCE
Qty: 0 | Refills: 0 | Status: COMPLETED | OUTPATIENT
Start: 2017-06-12 | End: 2017-06-12

## 2017-06-12 RX ORDER — FAMOTIDINE 10 MG/ML
20 INJECTION INTRAVENOUS DAILY
Qty: 0 | Refills: 0 | Status: DISCONTINUED | OUTPATIENT
Start: 2017-06-12 | End: 2017-06-15

## 2017-06-12 RX ORDER — FUROSEMIDE 40 MG
20 TABLET ORAL ONCE
Qty: 0 | Refills: 0 | Status: COMPLETED | OUTPATIENT
Start: 2017-06-12 | End: 2017-06-12

## 2017-06-12 RX ORDER — SODIUM CHLORIDE 9 MG/ML
1000 INJECTION, SOLUTION INTRAVENOUS
Qty: 0 | Refills: 0 | Status: DISCONTINUED | OUTPATIENT
Start: 2017-06-12 | End: 2017-06-29

## 2017-06-12 RX ORDER — SODIUM CHLORIDE 9 MG/ML
1000 INJECTION INTRAMUSCULAR; INTRAVENOUS; SUBCUTANEOUS
Qty: 0 | Refills: 0 | Status: DISCONTINUED | OUTPATIENT
Start: 2017-06-12 | End: 2017-06-29

## 2017-06-12 RX ORDER — POTASSIUM CHLORIDE 20 MEQ
10 PACKET (EA) ORAL
Qty: 0 | Refills: 0 | Status: COMPLETED | OUTPATIENT
Start: 2017-06-12 | End: 2017-06-12

## 2017-06-12 RX ORDER — FUROSEMIDE 40 MG
40 TABLET ORAL ONCE
Qty: 0 | Refills: 0 | Status: COMPLETED | OUTPATIENT
Start: 2017-06-12 | End: 2017-06-12

## 2017-06-12 RX ORDER — INSULIN HUMAN 100 [IU]/ML
3 INJECTION, SOLUTION SUBCUTANEOUS
Qty: 100 | Refills: 0 | Status: DISCONTINUED | OUTPATIENT
Start: 2017-06-12 | End: 2017-06-16

## 2017-06-12 RX ORDER — CIPROFLOXACIN LACTATE 400MG/40ML
400 VIAL (ML) INTRAVENOUS EVERY 12 HOURS
Qty: 0 | Refills: 0 | Status: DISCONTINUED | OUTPATIENT
Start: 2017-06-12 | End: 2017-06-13

## 2017-06-12 RX ORDER — VANCOMYCIN HCL 1 G
1000 VIAL (EA) INTRAVENOUS EVERY 12 HOURS
Qty: 0 | Refills: 0 | Status: DISCONTINUED | OUTPATIENT
Start: 2017-06-12 | End: 2017-06-13

## 2017-06-12 RX ORDER — LIDOCAINE HCL 20 MG/ML
100 VIAL (ML) INJECTION ONCE
Qty: 0 | Refills: 0 | Status: COMPLETED | OUTPATIENT
Start: 2017-06-12 | End: 2017-06-12

## 2017-06-12 RX ORDER — NOREPINEPHRINE BITARTRATE/D5W 8 MG/250ML
0.1 PLASTIC BAG, INJECTION (ML) INTRAVENOUS
Qty: 8 | Refills: 0 | Status: DISCONTINUED | OUTPATIENT
Start: 2017-06-12 | End: 2017-06-13

## 2017-06-12 RX ORDER — DEXTROSE 50 % IN WATER 50 %
25 SYRINGE (ML) INTRAVENOUS ONCE
Qty: 0 | Refills: 0 | Status: DISCONTINUED | OUTPATIENT
Start: 2017-06-12 | End: 2017-06-15

## 2017-06-12 RX ORDER — MILRINONE LACTATE 1 MG/ML
0.2 INJECTION, SOLUTION INTRAVENOUS
Qty: 20 | Refills: 0 | Status: DISCONTINUED | OUTPATIENT
Start: 2017-06-12 | End: 2017-06-15

## 2017-06-12 RX ORDER — AMIODARONE HYDROCHLORIDE 400 MG/1
1 TABLET ORAL
Qty: 900 | Refills: 0 | Status: DISCONTINUED | OUTPATIENT
Start: 2017-06-12 | End: 2017-06-14

## 2017-06-12 RX ORDER — DEXTROSE 50 % IN WATER 50 %
1 SYRINGE (ML) INTRAVENOUS ONCE
Qty: 0 | Refills: 0 | Status: DISCONTINUED | OUTPATIENT
Start: 2017-06-12 | End: 2017-06-15

## 2017-06-12 RX ORDER — MEPERIDINE HYDROCHLORIDE 50 MG/ML
25 INJECTION INTRAMUSCULAR; INTRAVENOUS; SUBCUTANEOUS ONCE
Qty: 0 | Refills: 0 | Status: DISCONTINUED | OUTPATIENT
Start: 2017-06-12 | End: 2017-06-13

## 2017-06-12 RX ORDER — FLUCONAZOLE 150 MG/1
400 TABLET ORAL EVERY 24 HOURS
Qty: 0 | Refills: 0 | Status: COMPLETED | OUTPATIENT
Start: 2017-06-13 | End: 2017-06-14

## 2017-06-12 RX ORDER — DEXTROSE 50 % IN WATER 50 %
12.5 SYRINGE (ML) INTRAVENOUS ONCE
Qty: 0 | Refills: 0 | Status: DISCONTINUED | OUTPATIENT
Start: 2017-06-12 | End: 2017-06-15

## 2017-06-12 RX ORDER — DOCUSATE SODIUM 100 MG
100 CAPSULE ORAL THREE TIMES A DAY
Qty: 0 | Refills: 0 | Status: DISCONTINUED | OUTPATIENT
Start: 2017-06-12 | End: 2017-07-17

## 2017-06-12 RX ORDER — LIDOCAINE HCL 20 MG/ML
2 VIAL (ML) INJECTION
Qty: 2 | Refills: 0 | Status: DISCONTINUED | OUTPATIENT
Start: 2017-06-12 | End: 2017-06-13

## 2017-06-12 RX ORDER — ASPIRIN/CALCIUM CARB/MAGNESIUM 324 MG
81 TABLET ORAL DAILY
Qty: 0 | Refills: 0 | Status: DISCONTINUED | OUTPATIENT
Start: 2017-06-12 | End: 2017-06-13

## 2017-06-12 RX ORDER — LIDOCAINE HCL 20 MG/ML
50 VIAL (ML) INJECTION ONCE
Qty: 0 | Refills: 0 | Status: COMPLETED | OUTPATIENT
Start: 2017-06-12 | End: 2017-06-12

## 2017-06-12 RX ORDER — DOBUTAMINE HCL 250MG/20ML
2.5 VIAL (ML) INTRAVENOUS
Qty: 500 | Refills: 0 | Status: DISCONTINUED | OUTPATIENT
Start: 2017-06-12 | End: 2017-06-13

## 2017-06-12 RX ADMIN — Medication 100 GRAM(S): at 19:45

## 2017-06-12 RX ADMIN — Medication 5 MILLIGRAM(S): at 16:00

## 2017-06-12 RX ADMIN — CHLORHEXIDINE GLUCONATE 1 APPLICATION(S): 213 SOLUTION TOPICAL at 02:00

## 2017-06-12 RX ADMIN — Medication 5 MILLIGRAM(S): at 01:06

## 2017-06-12 RX ADMIN — Medication 16.88 MICROGRAM(S)/KG/MIN: at 15:48

## 2017-06-12 RX ADMIN — Medication 50 MILLIEQUIVALENT(S): at 16:00

## 2017-06-12 RX ADMIN — MEXILETINE HYDROCHLORIDE 150 MILLIGRAM(S): 150 CAPSULE ORAL at 05:23

## 2017-06-12 RX ADMIN — Medication 5 MILLIGRAM(S): at 23:42

## 2017-06-12 RX ADMIN — Medication 50 MILLIEQUIVALENT(S): at 16:30

## 2017-06-12 RX ADMIN — Medication 50 MILLIEQUIVALENT(S): at 14:30

## 2017-06-12 RX ADMIN — Medication 40 MILLIGRAM(S): at 16:52

## 2017-06-12 RX ADMIN — AMIODARONE HYDROCHLORIDE 600 MILLIGRAM(S): 400 TABLET ORAL at 23:41

## 2017-06-12 RX ADMIN — Medication 200 MILLIGRAM(S): at 20:36

## 2017-06-12 RX ADMIN — HYDROMORPHONE HYDROCHLORIDE 0.5 MILLIGRAM(S): 2 INJECTION INTRAMUSCULAR; INTRAVENOUS; SUBCUTANEOUS at 21:45

## 2017-06-12 RX ADMIN — MILRINONE LACTATE 8.44 MICROGRAM(S)/KG/MIN: 1 INJECTION, SOLUTION INTRAVENOUS at 15:20

## 2017-06-12 RX ADMIN — CHLORHEXIDINE GLUCONATE 5 MILLILITER(S): 213 SOLUTION TOPICAL at 06:14

## 2017-06-12 RX ADMIN — FENTANYL CITRATE 50 MICROGRAM(S): 50 INJECTION INTRAVENOUS at 15:40

## 2017-06-12 RX ADMIN — Medication 100 GRAM(S): at 15:20

## 2017-06-12 RX ADMIN — Medication 50 MILLIEQUIVALENT(S): at 18:30

## 2017-06-12 RX ADMIN — Medication 28.12 MICROGRAM(S)/KG/MIN: at 15:47

## 2017-06-12 RX ADMIN — Medication 50 MILLIEQUIVALENT(S): at 15:30

## 2017-06-12 RX ADMIN — SODIUM CHLORIDE 3 MILLILITER(S): 9 INJECTION INTRAMUSCULAR; INTRAVENOUS; SUBCUTANEOUS at 05:22

## 2017-06-12 RX ADMIN — FAMOTIDINE 20 MILLIGRAM(S): 10 INJECTION INTRAVENOUS at 16:00

## 2017-06-12 RX ADMIN — Medication 50 MILLIEQUIVALENT(S): at 20:00

## 2017-06-12 RX ADMIN — HYDROMORPHONE HYDROCHLORIDE 0.5 MILLIGRAM(S): 2 INJECTION INTRAMUSCULAR; INTRAVENOUS; SUBCUTANEOUS at 21:15

## 2017-06-12 RX ADMIN — Medication 20 MILLIGRAM(S): at 22:00

## 2017-06-12 RX ADMIN — Medication 100 MILLIGRAM(S): at 23:15

## 2017-06-12 RX ADMIN — Medication 250 MILLIGRAM(S): at 20:35

## 2017-06-12 RX ADMIN — FENTANYL CITRATE 50 MICROGRAM(S): 50 INJECTION INTRAVENOUS at 15:55

## 2017-06-12 RX ADMIN — Medication 50 MILLIGRAM(S): at 23:25

## 2017-06-12 RX ADMIN — VASOPRESSIN 4 UNIT(S)/MIN: 20 INJECTION INTRAVENOUS at 15:47

## 2017-06-12 RX ADMIN — Medication 100 MILLIGRAM(S): at 05:23

## 2017-06-12 RX ADMIN — HYDROMORPHONE HYDROCHLORIDE 0.5 MILLIGRAM(S): 2 INJECTION INTRAMUSCULAR; INTRAVENOUS; SUBCUTANEOUS at 21:30

## 2017-06-12 RX ADMIN — EPINEPHRINE 28.12 MICROGRAM(S)/KG/MIN: 0.3 INJECTION INTRAMUSCULAR; SUBCUTANEOUS at 15:48

## 2017-06-12 NOTE — PROGRESS NOTE ADULT - ATTENDING COMMENTS
NORMAN- cardiorenal on Lasix drip, now euvolumic- decrease Lasix drip per CCU, creatinine improving/stable    mild urinary retention-yesterday ?postvoid vs random bladder sono with 200s Urine in bladder;  check-Bladder scan today for pvr    Hypervolumia/CHF- lasix drip; monitor Is/Os, LVAD eval    Met acidosis- monitor Hco3 trend daily. NORMAN- cardiorenal +mild retention component (but did not rquire emily)  creatinine had improved to 1.4---now s/p LVAD- monitor UO and creatinine trend  Met acidosis- monitor hco3  CHF-LAsix as needed

## 2017-06-12 NOTE — PROGRESS NOTE ADULT - PROBLEM SELECTOR PLAN 1
hemodynamically mediated in setting of decompensated heart failure +/- urinary retention component. Pt with with stable Scr at 1.4 on labs performed in am today  Pt non -oliguric. Check renal and bladder sonogram. Monitor BMP, strict I/O, avoid nephrotoxics, NSAIDs, RCA. Renal dose of medications. hemodynamically mediated in setting of decompensated heart failure +/- urinary retention component. Pt with with stable Scr at 1.4 on labs performed in am today  Pt non -oliguric. Monitor BMP closely as pt now hypotensive. Check renal and bladder sonogram. Strict I/O, avoid nephrotoxics, NSAIDs, RCA. Renal dose of medications.

## 2017-06-12 NOTE — PROGRESS NOTE ADULT - ASSESSMENT
68y/o M with advanced NICM BiV HFrEF 15-20% on a stable dose of milrinone at 3mcg/kg/min via PICC s/p AICD, with replacement from [employer] to [employer] (2009), PAF on AC and h/o VT, with recent admission for AICD firing, with decompensated heart failure s/p LVAD on 6/12.

## 2017-06-12 NOTE — PROGRESS NOTE ADULT - SUBJECTIVE AND OBJECTIVE BOX
[facility] DIVISION OF KIDNEY DISEASES AND HYPERTENSION -- FOLLOW UP NOTE  --------------------------------------------------------------------------------  Chief Complaint: Heart failure      24hour events/Subjective: Pt s/p LVAD today, currently  intubated and requiring pressor support.        PAST HISTORY  --------------------------------------------------------------------------------  No significant changes to PMH, PSH, FHx, SHx, unless otherwise noted    ALLERGIES & MEDICATIONS  --------------------------------------------------------------------------------  Allergies    No Known Allergies    Intolerances      Standing Inpatient Medications  sodium chloride 0.9%. 1000milliLiter(s) IV Continuous <Continuous>  famotidine Injectable 20milliGRAM(s) IV Push daily  aspirin enteric coated 81milliGRAM(s) Oral daily  docusate sodium 100milliGRAM(s) Oral three times a day  insulin Infusion 3Unit(s)/Hr IV Continuous <Continuous>  dextrose 5%. 1000milliLiter(s) IV Continuous <Continuous>  dextrose 50% Injectable 12.5Gram(s) IV Push once  dextrose 50% Injectable 25Gram(s) IV Push once  dextrose 50% Injectable 25Gram(s) IV Push once  metoclopramide Injectable 5milliGRAM(s) IV Push every 8 hours  DOBUTamine Infusion 7.5MICROgram(s)/kG/Min IV Continuous <Continuous>  EPINEPHrine     Infusion 0.1MICROgram(s)/kG/Min IV Continuous <Continuous>  milrinone Infusion 0.375MICROgram(s)/kG/Min IV Continuous <Continuous>  norepinephrine Infusion 0.2MICROgram(s)/kG/Min IV Continuous <Continuous>  vasopressin Infusion 0.067Unit(s)/Min IV Continuous <Continuous>  ciprofloxacin   IVPB 400milliGRAM(s) IV Intermittent every 12 hours  vancomycin  IVPB 1000milliGRAM(s) IV Intermittent every 12 hours    PRN Inpatient Medications  oxyCODONE  5 mG/acetaminophen 325 mG 1Tablet(s) Oral every 4 hours PRN  oxyCODONE  5 mG/acetaminophen 325 mG 2Tablet(s) Oral every 6 hours PRN  meperidine     Injectable 25milliGRAM(s) IV Push once PRN  dextrose Gel 1Dose(s) Oral once PRN  glucagon  Injectable 1milliGRAM(s) IntraMuscular once PRN      REVIEW OF SYSTEMS  --------------------------------------------------------------------------------  unable to obtain        VITALS/PHYSICAL EXAM  --------------------------------------------------------------------------------  T(C): 37, Max: 37.2 (06-11 @ 23:00)  HR: 99 (86 - 122)  BP: 92/59  RR: 15 (11 - 18)  SpO2: 92% (92% - 99%)  Wt(kg): --  Height (cm): 172.7 (06-11-17 @ 17:13)  Weight (kg): 75 (06-11-17 @ 15:17)  BMI (kg/m2): 25.1 (06-11-17 @ 17:13)  BSA (m2): 1.88 (06-11-17 @ 17:13)      I & Os for current day (as of 06-12-17 @ 14:31)  =============================================  IN: 1419.5 ml / OUT: 6075 ml / NET: -4655.5 ml    Physical Exam:  	Gen: NAD  	HEENT: +ETT  	Pulm: Coarse bs b/l  	CV: S1S2  	Abd: Soft, +BS  	Ext: No LE edema B/L                      Neuro: sedated  	Skin: Warm and Dry   	    LABS/STUDIES  --------------------------------------------------------------------------------              8.9    11.6  >-----------<  197      [06-12-17 @ 02:34]              29.3     135  |  99  |  26  ----------------------------<  91      [06-12-17 @ 02:34]  4.1   |  23  |  1.44        Ca     8.9     [06-12-17 @ 02:34]      Mg     2.4     [06-11-17 @ 04:27]      Phos  3.3     [06-11-17 @ 04:27]    TPro  6.8  /  Alb  3.2  /  TBili  6.6  /  DBili  x   /  AST  387  /  ALT  111  /  AlkPhos  100  [06-12-17 @ 02:34]    PT/INR: PT 15.8 , INR 1.45       [06-12-17 @ 02:59]  PTT: 60.5       [06-12-17 @ 02:59]    LDH 2083      [06-12-17 @ 02:34]    Creatinine Trend:  SCr 1.44 [06-12 @ 02:34]  SCr 1.40 [06-11 @ 21:18]  SCr 1.37 [06-11 @ 14:11]  SCr 1.42 [06-11 @ 04:27]  SCr 1.58 [06-10 @ 21:30]    Urinalysis - [06-03-17 @ 10:46]      Color Mary / Appearance Clear / SG 1.012 / pH 5.0      Gluc Negative / Ketone Negative  / Bili Negative / Urobili 1.0       Blood Negative / Protein Negative / Leuk Est Negative / Nitrite Negative      RBC 2 / WBC 1 / Hyaline 6 / Gran  / Sq Epi  / Non Sq Epi 0 / Bacteria Negative      Iron 20, TIBC 352, %sat 6      [06-08-17 @ 07:14]  Ferritin 121.0      [06-09-17 @ 19:08]  HbA1c 5.5      [06-07-17 @ 06:14]  TSH 3.54      [06-07-17 @ 06:14]  Lipid: chol 62, TG 33, HDL 17, LDL 38      [06-07-17 @ 06:14]    HBsAb Nonreact      [05-15-17 @ 15:40]  HBsAg Nonreact      [05-15-17 @ 15:40]  HBcAb Nonreact      [05-15-17 @ 15:40]  HCV 0.17, Nonreact      [05-15-17 @ 15:40]  HIV Nonreact      [05-15-17 @ 13:47] [facility] DIVISION OF KIDNEY DISEASES AND HYPERTENSION -- FOLLOW UP NOTE  --------------------------------------------------------------------------------  Chief Complaint: Heart failure      24hour events/Subjective: Pt s/p LVAD today, currently  intubated and requiring pressor support.        PAST HISTORY  --------------------------------------------------------------------------------  No significant changes to PMH, PSH, FHx, SHx, unless otherwise noted    ALLERGIES & MEDICATIONS  --------------------------------------------------------------------------------  Allergies    No Known Allergies    Intolerances      Standing Inpatient Medications  sodium chloride 0.9%. 1000milliLiter(s) IV Continuous <Continuous>  famotidine Injectable 20milliGRAM(s) IV Push daily  aspirin enteric coated 81milliGRAM(s) Oral daily  docusate sodium 100milliGRAM(s) Oral three times a day  insulin Infusion 3Unit(s)/Hr IV Continuous <Continuous>  dextrose 5%. 1000milliLiter(s) IV Continuous <Continuous>  dextrose 50% Injectable 12.5Gram(s) IV Push once  dextrose 50% Injectable 25Gram(s) IV Push once  dextrose 50% Injectable 25Gram(s) IV Push once  metoclopramide Injectable 5milliGRAM(s) IV Push every 8 hours  DOBUTamine Infusion 7.5MICROgram(s)/kG/Min IV Continuous <Continuous>  EPINEPHrine     Infusion 0.1MICROgram(s)/kG/Min IV Continuous <Continuous>  milrinone Infusion 0.375MICROgram(s)/kG/Min IV Continuous <Continuous>  norepinephrine Infusion 0.2MICROgram(s)/kG/Min IV Continuous <Continuous>  vasopressin Infusion 0.067Unit(s)/Min IV Continuous <Continuous>  ciprofloxacin   IVPB 400milliGRAM(s) IV Intermittent every 12 hours  vancomycin  IVPB 1000milliGRAM(s) IV Intermittent every 12 hours    PRN Inpatient Medications  oxyCODONE  5 mG/acetaminophen 325 mG 1Tablet(s) Oral every 4 hours PRN  oxyCODONE  5 mG/acetaminophen 325 mG 2Tablet(s) Oral every 6 hours PRN  meperidine     Injectable 25milliGRAM(s) IV Push once PRN  dextrose Gel 1Dose(s) Oral once PRN  glucagon  Injectable 1milliGRAM(s) IntraMuscular once PRN      REVIEW OF SYSTEMS  --------------------------------------------------------------------------------  unable to obtain        VITALS/PHYSICAL EXAM  --------------------------------------------------------------------------------  T(C): 37, Max: 37.2 (06-11 @ 23:00)  HR: 99 (86 - 122)  BP: 92/59  RR: 15 (11 - 18)  SpO2: 92% (92% - 99%)  Wt(kg): --  Height (cm): 172.7 (06-11-17 @ 17:13)  Weight (kg): 75 (06-11-17 @ 15:17)  BMI (kg/m2): 25.1 (06-11-17 @ 17:13)  BSA (m2): 1.88 (06-11-17 @ 17:13)      I & Os for current day (as of 06-12-17 @ 14:31)  =============================================  IN: 1419.5 ml / OUT: 6075 ml / NET: -4655.5 ml    Physical Exam:  	Gen: NAD  	HEENT: +ETT  	Pulm: Coarse bs b/l  	CV: S1S2  	Abd: Soft, +BS  	Ext: No LE edema B/L                      Neuro: sedated  	Skin: no rashes  	    LABS/STUDIES  --------------------------------------------------------------------------------              8.9    11.6  >-----------<  197      [06-12-17 @ 02:34]              29.3     135  |  99  |  26  ----------------------------<  91      [06-12-17 @ 02:34]  4.1   |  23  |  1.44        Ca     8.9     [06-12-17 @ 02:34]      Mg     2.4     [06-11-17 @ 04:27]      Phos  3.3     [06-11-17 @ 04:27]    TPro  6.8  /  Alb  3.2  /  TBili  6.6  /  DBili  x   /  AST  387  /  ALT  111  /  AlkPhos  100  [06-12-17 @ 02:34]    PT/INR: PT 15.8 , INR 1.45       [06-12-17 @ 02:59]  PTT: 60.5       [06-12-17 @ 02:59]    LDH 2083      [06-12-17 @ 02:34]    Creatinine Trend:  SCr 1.44 [06-12 @ 02:34]  SCr 1.40 [06-11 @ 21:18]  SCr 1.37 [06-11 @ 14:11]  SCr 1.42 [06-11 @ 04:27]  SCr 1.58 [06-10 @ 21:30]    Urinalysis - [06-03-17 @ 10:46]      Color Mary / Appearance Clear / SG 1.012 / pH 5.0      Gluc Negative / Ketone Negative  / Bili Negative / Urobili 1.0       Blood Negative / Protein Negative / Leuk Est Negative / Nitrite Negative      RBC 2 / WBC 1 / Hyaline 6 / Gran  / Sq Epi  / Non Sq Epi 0 / Bacteria Negative      Iron 20, TIBC 352, %sat 6      [06-08-17 @ 07:14]  Ferritin 121.0      [06-09-17 @ 19:08]  HbA1c 5.5      [06-07-17 @ 06:14]  TSH 3.54      [06-07-17 @ 06:14]  Lipid: chol 62, TG 33, HDL 17, LDL 38      [06-07-17 @ 06:14]    HBsAb Nonreact      [05-15-17 @ 15:40]  HBsAg Nonreact      [05-15-17 @ 15:40]  HBcAb Nonreact      [05-15-17 @ 15:40]  HCV 0.17, Nonreact      [05-15-17 @ 15:40]  HIV Nonreact      [05-15-17 @ 13:47] [facility] DIVISION OF KIDNEY DISEASES AND HYPERTENSION -- FOLLOW UP NOTE  --------------------------------------------------------------------------------  Chief Complaint: Heart failure      24hour events/Subjective: Pt s/p LVAD today, currently  intubated and requiring pressor support.        PAST HISTORY  --------------------------------------------------------------------------------  No significant changes to PMH, PSH, FHx, SHx, unless otherwise noted    ALLERGIES & MEDICATIONS  --------------------------------------------------------------------------------  Allergies    No Known Allergies    Intolerances      Standing Inpatient Medications  sodium chloride 0.9%. 1000milliLiter(s) IV Continuous <Continuous>  famotidine Injectable 20milliGRAM(s) IV Push daily  aspirin enteric coated 81milliGRAM(s) Oral daily  docusate sodium 100milliGRAM(s) Oral three times a day  insulin Infusion 3Unit(s)/Hr IV Continuous <Continuous>  dextrose 5%. 1000milliLiter(s) IV Continuous <Continuous>  dextrose 50% Injectable 12.5Gram(s) IV Push once  dextrose 50% Injectable 25Gram(s) IV Push once  dextrose 50% Injectable 25Gram(s) IV Push once  metoclopramide Injectable 5milliGRAM(s) IV Push every 8 hours  DOBUTamine Infusion 7.5MICROgram(s)/kG/Min IV Continuous <Continuous>  EPINEPHrine     Infusion 0.1MICROgram(s)/kG/Min IV Continuous <Continuous>  milrinone Infusion 0.375MICROgram(s)/kG/Min IV Continuous <Continuous>  norepinephrine Infusion 0.2MICROgram(s)/kG/Min IV Continuous <Continuous>  vasopressin Infusion 0.067Unit(s)/Min IV Continuous <Continuous>  ciprofloxacin   IVPB 400milliGRAM(s) IV Intermittent every 12 hours  vancomycin  IVPB 1000milliGRAM(s) IV Intermittent every 12 hours    PRN Inpatient Medications  oxyCODONE  5 mG/acetaminophen 325 mG 1Tablet(s) Oral every 4 hours PRN  oxyCODONE  5 mG/acetaminophen 325 mG 2Tablet(s) Oral every 6 hours PRN  meperidine     Injectable 25milliGRAM(s) IV Push once PRN  dextrose Gel 1Dose(s) Oral once PRN  glucagon  Injectable 1milliGRAM(s) IntraMuscular once PRN      REVIEW OF SYSTEMS  --------------------------------------------------------------------------------  unable to obtain        VITALS/PHYSICAL EXAM  --------------------------------------------------------------------------------  T(C): 37, Max: 37.2 (06-11 @ 23:00)  HR: 99 (86 - 122)  BP: 92/59  RR: 15 (11 - 18)  SpO2: 92% (92% - 99%)  Wt(kg): --  Height (cm): 172.7 (06-11-17 @ 17:13)  Weight (kg): 75 (06-11-17 @ 15:17)  BMI (kg/m2): 25.1 (06-11-17 @ 17:13)  BSA (m2): 1.88 (06-11-17 @ 17:13)      I & Os for current day (as of 06-12-17 @ 14:31)  =============================================  IN: 1419.5 ml / OUT: 6075 ml / NET: -4655.5 ml    Physical Exam:  	Gen: NAD  	HEENT: +ETT  	Pulm: Coarse bs b/l  	CV: S1S2  	Abd: Soft, +BS  	Ext: No LE edema B/L                      Neuro: sedated  	Skin: no rashes  	Other : +emily    LABS/STUDIES  --------------------------------------------------------------------------------              8.9    11.6  >-----------<  197      [06-12-17 @ 02:34]              29.3     135  |  99  |  26  ----------------------------<  91      [06-12-17 @ 02:34]  4.1   |  23  |  1.44        Ca     8.9     [06-12-17 @ 02:34]      Mg     2.4     [06-11-17 @ 04:27]      Phos  3.3     [06-11-17 @ 04:27]    TPro  6.8  /  Alb  3.2  /  TBili  6.6  /  DBili  x   /  AST  387  /  ALT  111  /  AlkPhos  100  [06-12-17 @ 02:34]    PT/INR: PT 15.8 , INR 1.45       [06-12-17 @ 02:59]  PTT: 60.5       [06-12-17 @ 02:59]    LDH 2083      [06-12-17 @ 02:34]    Creatinine Trend:  SCr 1.44 [06-12 @ 02:34]  SCr 1.40 [06-11 @ 21:18]  SCr 1.37 [06-11 @ 14:11]  SCr 1.42 [06-11 @ 04:27]  SCr 1.58 [06-10 @ 21:30]    Urinalysis - [06-03-17 @ 10:46]      Color Mary / Appearance Clear / SG 1.012 / pH 5.0      Gluc Negative / Ketone Negative  / Bili Negative / Urobili 1.0       Blood Negative / Protein Negative / Leuk Est Negative / Nitrite Negative      RBC 2 / WBC 1 / Hyaline 6 / Gran  / Sq Epi  / Non Sq Epi 0 / Bacteria Negative      Iron 20, TIBC 352, %sat 6      [06-08-17 @ 07:14]  Ferritin 121.0      [06-09-17 @ 19:08]  HbA1c 5.5      [06-07-17 @ 06:14]  TSH 3.54      [06-07-17 @ 06:14]  Lipid: chol 62, TG 33, HDL 17, LDL 38      [06-07-17 @ 06:14]    HBsAb Nonreact      [05-15-17 @ 15:40]  HBsAg Nonreact      [05-15-17 @ 15:40]  HBcAb Nonreact      [05-15-17 @ 15:40]  HCV 0.17, Nonreact      [05-15-17 @ 15:40]  HIV Nonreact      [05-15-17 @ 13:47]

## 2017-06-13 DIAGNOSIS — R56.9 UNSPECIFIED CONVULSIONS: ICD-10-CM

## 2017-06-13 DIAGNOSIS — N17.9 ACUTE KIDNEY FAILURE, UNSPECIFIED: ICD-10-CM

## 2017-06-13 DIAGNOSIS — I50.23 ACUTE ON CHRONIC SYSTOLIC (CONGESTIVE) HEART FAILURE: ICD-10-CM

## 2017-06-13 DIAGNOSIS — Z95.811 PRESENCE OF HEART ASSIST DEVICE: ICD-10-CM

## 2017-06-13 DIAGNOSIS — R57.0 CARDIOGENIC SHOCK: ICD-10-CM

## 2017-06-13 LAB
ALBUMIN SERPL ELPH-MCNC: 3.4 G/DL — SIGNIFICANT CHANGE UP (ref 3.3–5)
ALBUMIN SERPL ELPH-MCNC: 3.4 G/DL — SIGNIFICANT CHANGE UP (ref 3.3–5)
ALP SERPL-CCNC: 66 U/L — SIGNIFICANT CHANGE UP (ref 40–120)
ALP SERPL-CCNC: 67 U/L — SIGNIFICANT CHANGE UP (ref 40–120)
ALT FLD-CCNC: 68 U/L RC — HIGH (ref 10–45)
ALT FLD-CCNC: 70 U/L RC — HIGH (ref 10–45)
ANION GAP SERPL CALC-SCNC: 15 MMOL/L — SIGNIFICANT CHANGE UP (ref 5–17)
ANION GAP SERPL CALC-SCNC: 15 MMOL/L — SIGNIFICANT CHANGE UP (ref 5–17)
APTT BLD: 32.6 SEC — SIGNIFICANT CHANGE UP (ref 27.5–37.4)
APTT BLD: 41.8 SEC — HIGH (ref 27.5–37.4)
AST SERPL-CCNC: 181 U/L — HIGH (ref 10–40)
AST SERPL-CCNC: 208 U/L — HIGH (ref 10–40)
BASE EXCESS BLDMV CALC-SCNC: -4.3 MMOL/L — LOW (ref -3–3)
BASE EXCESS BLDMV CALC-SCNC: -4.4 MMOL/L — LOW (ref -3–3)
BASE EXCESS BLDMV CALC-SCNC: -4.9 MMOL/L — LOW (ref -3–3)
BASE EXCESS BLDMV CALC-SCNC: -5.1 MMOL/L — LOW (ref -3–3)
BASE EXCESS BLDMV CALC-SCNC: -5.2 MMOL/L — LOW (ref -3–3)
BASE EXCESS BLDMV CALC-SCNC: -5.6 MMOL/L — LOW (ref -3–3)
BASE EXCESS BLDMV CALC-SCNC: -6.1 MMOL/L — LOW (ref -3–3)
BASE EXCESS BLDMV CALC-SCNC: -6.1 MMOL/L — LOW (ref -3–3)
BILIRUB SERPL-MCNC: 4.9 MG/DL — HIGH (ref 0.2–1.2)
BILIRUB SERPL-MCNC: 5.1 MG/DL — HIGH (ref 0.2–1.2)
BUN SERPL-MCNC: 24 MG/DL — HIGH (ref 7–23)
BUN SERPL-MCNC: 31 MG/DL — HIGH (ref 7–23)
CALCIUM SERPL-MCNC: 8.4 MG/DL — SIGNIFICANT CHANGE UP (ref 8.4–10.5)
CALCIUM SERPL-MCNC: 8.6 MG/DL — SIGNIFICANT CHANGE UP (ref 8.4–10.5)
CHLORIDE SERPL-SCNC: 97 MMOL/L — SIGNIFICANT CHANGE UP (ref 96–108)
CHLORIDE SERPL-SCNC: 99 MMOL/L — SIGNIFICANT CHANGE UP (ref 96–108)
CO2 BLDMV-SCNC: 19 MMOL/L — LOW (ref 21–29)
CO2 BLDMV-SCNC: 20 MMOL/L — LOW (ref 21–29)
CO2 BLDMV-SCNC: 21 MMOL/L — SIGNIFICANT CHANGE UP (ref 21–29)
CO2 SERPL-SCNC: 17 MMOL/L — LOW (ref 22–31)
CO2 SERPL-SCNC: 19 MMOL/L — LOW (ref 22–31)
CREAT SERPL-MCNC: 1.72 MG/DL — HIGH (ref 0.5–1.3)
CREAT SERPL-MCNC: 2.2 MG/DL — HIGH (ref 0.5–1.3)
GAS PNL BLDA: SIGNIFICANT CHANGE UP
GAS PNL BLDMV: SIGNIFICANT CHANGE UP
GLUCOSE SERPL-MCNC: 125 MG/DL — HIGH (ref 70–99)
GLUCOSE SERPL-MCNC: 191 MG/DL — HIGH (ref 70–99)
HAPTOGLOB SERPL-MCNC: <20 MG/DL — LOW (ref 34–200)
HCO3 BLDMV-SCNC: 18 MMOL/L — LOW (ref 20–28)
HCO3 BLDMV-SCNC: 18 MMOL/L — LOW (ref 20–28)
HCO3 BLDMV-SCNC: 19 MMOL/L — LOW (ref 20–28)
HCO3 BLDMV-SCNC: 20 MMOL/L — SIGNIFICANT CHANGE UP (ref 20–28)
HCT VFR BLD CALC: 30.7 % — LOW (ref 39–50)
HGB BLD-MCNC: 9.7 G/DL — LOW (ref 13–17)
HOROWITZ INDEX BLDMV+IHG-RTO: 50 — SIGNIFICANT CHANGE UP
INR BLD: 1.54 RATIO — HIGH (ref 0.88–1.16)
LDH SERPL L TO P-CCNC: 1341 U/L — HIGH (ref 50–242)
MCHC RBC-ENTMCNC: 27.7 PG — SIGNIFICANT CHANGE UP (ref 27–34)
MCHC RBC-ENTMCNC: 31.6 GM/DL — LOW (ref 32–36)
MCV RBC AUTO: 87.5 FL — SIGNIFICANT CHANGE UP (ref 80–100)
O2 CT VFR BLD CALC: 37 MMHG — SIGNIFICANT CHANGE UP (ref 30–65)
O2 CT VFR BLD CALC: 38 MMHG — SIGNIFICANT CHANGE UP (ref 30–65)
O2 CT VFR BLD CALC: 40 MMHG — SIGNIFICANT CHANGE UP (ref 30–65)
O2 CT VFR BLD CALC: 42 MMHG — SIGNIFICANT CHANGE UP (ref 30–65)
O2 CT VFR BLD CALC: 48 MMHG — SIGNIFICANT CHANGE UP (ref 30–65)
PCO2 BLDMV: 33 MMHG — SIGNIFICANT CHANGE UP (ref 30–65)
PCO2 BLDMV: 34 MMHG — SIGNIFICANT CHANGE UP (ref 30–65)
PCO2 BLDMV: 35 MMHG — SIGNIFICANT CHANGE UP (ref 30–65)
PCO2 BLDMV: 35 MMHG — SIGNIFICANT CHANGE UP (ref 30–65)
PCO2 BLDMV: 37 MMHG — SIGNIFICANT CHANGE UP (ref 30–65)
PCO2 BLDMV: 38 MMHG — SIGNIFICANT CHANGE UP (ref 30–65)
PH BLDMV: 7.33 — SIGNIFICANT CHANGE UP (ref 7.32–7.45)
PH BLDMV: 7.34 — SIGNIFICANT CHANGE UP (ref 7.32–7.45)
PH BLDMV: 7.35 — SIGNIFICANT CHANGE UP (ref 7.32–7.45)
PH BLDMV: 7.36 — SIGNIFICANT CHANGE UP (ref 7.32–7.45)
PH BLDMV: 7.36 — SIGNIFICANT CHANGE UP (ref 7.32–7.45)
PH BLDMV: 7.38 — SIGNIFICANT CHANGE UP (ref 7.32–7.45)
PLATELET # BLD AUTO: 101 K/UL — LOW (ref 150–400)
POTASSIUM SERPL-MCNC: 4.8 MMOL/L — SIGNIFICANT CHANGE UP (ref 3.5–5.3)
POTASSIUM SERPL-MCNC: 5 MMOL/L — SIGNIFICANT CHANGE UP (ref 3.5–5.3)
POTASSIUM SERPL-SCNC: 4.8 MMOL/L — SIGNIFICANT CHANGE UP (ref 3.5–5.3)
POTASSIUM SERPL-SCNC: 5 MMOL/L — SIGNIFICANT CHANGE UP (ref 3.5–5.3)
PROT SERPL-MCNC: 6.1 G/DL — SIGNIFICANT CHANGE UP (ref 6–8.3)
PROT SERPL-MCNC: 6.2 G/DL — SIGNIFICANT CHANGE UP (ref 6–8.3)
PROTHROM AB SERPL-ACNC: 16.9 SEC — HIGH (ref 9.8–12.7)
RBC # BLD: 3.5 M/UL — LOW (ref 4.2–5.8)
RBC # FLD: 18.5 % — HIGH (ref 10.3–14.5)
SAO2 % BLDMV: 68 % — SIGNIFICANT CHANGE UP (ref 60–90)
SAO2 % BLDMV: 70 % — SIGNIFICANT CHANGE UP (ref 60–90)
SAO2 % BLDMV: 70 % — SIGNIFICANT CHANGE UP (ref 60–90)
SAO2 % BLDMV: 72 % — SIGNIFICANT CHANGE UP (ref 60–90)
SAO2 % BLDMV: 72 % — SIGNIFICANT CHANGE UP (ref 60–90)
SAO2 % BLDMV: 73 % — SIGNIFICANT CHANGE UP (ref 60–90)
SAO2 % BLDMV: 75 % — SIGNIFICANT CHANGE UP (ref 60–90)
SAO2 % BLDMV: 82 % — SIGNIFICANT CHANGE UP (ref 60–90)
SODIUM SERPL-SCNC: 129 MMOL/L — LOW (ref 135–145)
SODIUM SERPL-SCNC: 133 MMOL/L — LOW (ref 135–145)
VANCOMYCIN TROUGH SERPL-MCNC: 17.8 UG/ML — SIGNIFICANT CHANGE UP (ref 10–20)
WBC # BLD: 18.3 K/UL — HIGH (ref 3.8–10.5)
WBC # FLD AUTO: 18.3 K/UL — HIGH (ref 3.8–10.5)

## 2017-06-13 PROCEDURE — 71010: CPT | Mod: 26

## 2017-06-13 PROCEDURE — 99233 SBSQ HOSP IP/OBS HIGH 50: CPT | Mod: GC

## 2017-06-13 PROCEDURE — 99233 SBSQ HOSP IP/OBS HIGH 50: CPT | Mod: 25

## 2017-06-13 PROCEDURE — 95957 EEG DIGITAL ANALYSIS: CPT | Mod: 26

## 2017-06-13 PROCEDURE — 99291 CRITICAL CARE FIRST HOUR: CPT

## 2017-06-13 PROCEDURE — 99292 CRITICAL CARE ADDL 30 MIN: CPT

## 2017-06-13 PROCEDURE — 93750 INTERROGATION VAD IN PERSON: CPT

## 2017-06-13 PROCEDURE — 95819 EEG AWAKE AND ASLEEP: CPT | Mod: 26

## 2017-06-13 RX ORDER — AMIODARONE HYDROCHLORIDE 400 MG/1
150 TABLET ORAL ONCE
Qty: 0 | Refills: 0 | Status: COMPLETED | OUTPATIENT
Start: 2017-06-13 | End: 2017-06-13

## 2017-06-13 RX ORDER — DEXMEDETOMIDINE HYDROCHLORIDE IN 0.9% SODIUM CHLORIDE 4 UG/ML
0.2 INJECTION INTRAVENOUS
Qty: 200 | Refills: 0 | Status: DISCONTINUED | OUTPATIENT
Start: 2017-06-13 | End: 2017-06-13

## 2017-06-13 RX ORDER — HEPARIN SODIUM 5000 [USP'U]/ML
500 INJECTION INTRAVENOUS; SUBCUTANEOUS
Qty: 25000 | Refills: 0 | Status: DISCONTINUED | OUTPATIENT
Start: 2017-06-13 | End: 2017-06-14

## 2017-06-13 RX ORDER — NOREPINEPHRINE BITARTRATE/D5W 8 MG/250ML
0.07 PLASTIC BAG, INJECTION (ML) INTRAVENOUS
Qty: 16 | Refills: 0 | Status: DISCONTINUED | OUTPATIENT
Start: 2017-06-13 | End: 2017-06-15

## 2017-06-13 RX ORDER — FUROSEMIDE 40 MG
20 TABLET ORAL ONCE
Qty: 0 | Refills: 0 | Status: COMPLETED | OUTPATIENT
Start: 2017-06-13 | End: 2017-06-13

## 2017-06-13 RX ORDER — CIPROFLOXACIN LACTATE 400MG/40ML
400 VIAL (ML) INTRAVENOUS ONCE
Qty: 0 | Refills: 0 | Status: COMPLETED | OUTPATIENT
Start: 2017-06-13 | End: 2017-06-13

## 2017-06-13 RX ORDER — VANCOMYCIN HCL 1 G
1000 VIAL (EA) INTRAVENOUS EVERY 24 HOURS
Qty: 0 | Refills: 0 | Status: COMPLETED | OUTPATIENT
Start: 2017-06-13 | End: 2017-06-13

## 2017-06-13 RX ORDER — POTASSIUM CHLORIDE 20 MEQ
10 PACKET (EA) ORAL ONCE
Qty: 0 | Refills: 0 | Status: COMPLETED | OUTPATIENT
Start: 2017-06-13 | End: 2017-06-13

## 2017-06-13 RX ORDER — FUROSEMIDE 40 MG
80 TABLET ORAL ONCE
Qty: 0 | Refills: 0 | Status: COMPLETED | OUTPATIENT
Start: 2017-06-13 | End: 2017-06-13

## 2017-06-13 RX ORDER — CIPROFLOXACIN LACTATE 400MG/40ML
VIAL (ML) INTRAVENOUS
Qty: 0 | Refills: 0 | Status: DISCONTINUED | OUTPATIENT
Start: 2017-06-13 | End: 2017-06-15

## 2017-06-13 RX ORDER — MAGNESIUM SULFATE 500 MG/ML
1 VIAL (ML) INJECTION ONCE
Qty: 0 | Refills: 0 | Status: COMPLETED | OUTPATIENT
Start: 2017-06-13 | End: 2017-06-13

## 2017-06-13 RX ORDER — LEVETIRACETAM 250 MG/1
1000 TABLET, FILM COATED ORAL ONCE
Qty: 0 | Refills: 0 | Status: COMPLETED | OUTPATIENT
Start: 2017-06-13 | End: 2017-06-13

## 2017-06-13 RX ORDER — CIPROFLOXACIN LACTATE 400MG/40ML
400 VIAL (ML) INTRAVENOUS EVERY 12 HOURS
Qty: 0 | Refills: 0 | Status: DISCONTINUED | OUTPATIENT
Start: 2017-06-14 | End: 2017-06-15

## 2017-06-13 RX ORDER — DEXTROSE 50 % IN WATER 50 %
12.5 SYRINGE (ML) INTRAVENOUS ONCE
Qty: 0 | Refills: 0 | Status: COMPLETED | OUTPATIENT
Start: 2017-06-13 | End: 2017-06-13

## 2017-06-13 RX ORDER — FUROSEMIDE 40 MG
20 TABLET ORAL
Qty: 500 | Refills: 0 | Status: DISCONTINUED | OUTPATIENT
Start: 2017-06-13 | End: 2017-06-15

## 2017-06-13 RX ORDER — ASPIRIN/CALCIUM CARB/MAGNESIUM 324 MG
81 TABLET ORAL DAILY
Qty: 0 | Refills: 0 | Status: DISCONTINUED | OUTPATIENT
Start: 2017-06-13 | End: 2017-06-15

## 2017-06-13 RX ORDER — PROPOFOL 10 MG/ML
11.11 INJECTION, EMULSION INTRAVENOUS
Qty: 500 | Refills: 0 | Status: DISCONTINUED | OUTPATIENT
Start: 2017-06-13 | End: 2017-06-18

## 2017-06-13 RX ORDER — CHLORHEXIDINE GLUCONATE 213 G/1000ML
15 SOLUTION TOPICAL
Qty: 0 | Refills: 0 | Status: DISCONTINUED | OUTPATIENT
Start: 2017-06-13 | End: 2017-06-20

## 2017-06-13 RX ORDER — LEVETIRACETAM 250 MG/1
500 TABLET, FILM COATED ORAL EVERY 12 HOURS
Qty: 0 | Refills: 0 | Status: DISCONTINUED | OUTPATIENT
Start: 2017-06-13 | End: 2017-06-15

## 2017-06-13 RX ADMIN — Medication 100 GRAM(S): at 08:55

## 2017-06-13 RX ADMIN — MILRINONE LACTATE 8.44 MICROGRAM(S)/KG/MIN: 1 INJECTION, SOLUTION INTRAVENOUS at 01:31

## 2017-06-13 RX ADMIN — MILRINONE LACTATE 8.44 MICROGRAM(S)/KG/MIN: 1 INJECTION, SOLUTION INTRAVENOUS at 23:37

## 2017-06-13 RX ADMIN — AMIODARONE HYDROCHLORIDE 600 MILLIGRAM(S): 400 TABLET ORAL at 00:22

## 2017-06-13 RX ADMIN — Medication 5 MILLIGRAM(S): at 15:18

## 2017-06-13 RX ADMIN — VASOPRESSIN 4 UNIT(S)/MIN: 20 INJECTION INTRAVENOUS at 23:37

## 2017-06-13 RX ADMIN — Medication 20 MILLIGRAM(S): at 05:40

## 2017-06-13 RX ADMIN — AMIODARONE HYDROCHLORIDE 33.33 MG/MIN: 400 TABLET ORAL at 18:10

## 2017-06-13 RX ADMIN — LEVETIRACETAM 400 MILLIGRAM(S): 250 TABLET, FILM COATED ORAL at 18:09

## 2017-06-13 RX ADMIN — LEVETIRACETAM 400 MILLIGRAM(S): 250 TABLET, FILM COATED ORAL at 09:00

## 2017-06-13 RX ADMIN — AMIODARONE HYDROCHLORIDE 16.67 MG/MIN: 400 TABLET ORAL at 23:37

## 2017-06-13 RX ADMIN — FAMOTIDINE 20 MILLIGRAM(S): 10 INJECTION INTRAVENOUS at 12:39

## 2017-06-13 RX ADMIN — INSULIN HUMAN 3 UNIT(S)/HR: 100 INJECTION, SOLUTION SUBCUTANEOUS at 01:30

## 2017-06-13 RX ADMIN — Medication 11.25 MICROGRAM(S)/KG/MIN: at 01:30

## 2017-06-13 RX ADMIN — SODIUM CHLORIDE 10 MILLILITER(S): 9 INJECTION INTRAMUSCULAR; INTRAVENOUS; SUBCUTANEOUS at 23:38

## 2017-06-13 RX ADMIN — MILRINONE LACTATE 8.44 MICROGRAM(S)/KG/MIN: 1 INJECTION, SOLUTION INTRAVENOUS at 18:12

## 2017-06-13 RX ADMIN — CHLORHEXIDINE GLUCONATE 15 MILLILITER(S): 213 SOLUTION TOPICAL at 18:09

## 2017-06-13 RX ADMIN — INSULIN HUMAN 3 UNIT(S)/HR: 100 INJECTION, SOLUTION SUBCUTANEOUS at 23:36

## 2017-06-13 RX ADMIN — Medication 5 MICROGRAM(S)/KG/MIN: at 15:39

## 2017-06-13 RX ADMIN — Medication 5 MICROGRAM(S)/KG/MIN: at 23:37

## 2017-06-13 RX ADMIN — AMIODARONE HYDROCHLORIDE 600 MILLIGRAM(S): 400 TABLET ORAL at 03:17

## 2017-06-13 RX ADMIN — Medication 250 MILLIGRAM(S): at 09:31

## 2017-06-13 RX ADMIN — VASOPRESSIN 4 UNIT(S)/MIN: 20 INJECTION INTRAVENOUS at 18:11

## 2017-06-13 RX ADMIN — HEPARIN SODIUM 5 UNIT(S)/HR: 5000 INJECTION INTRAVENOUS; SUBCUTANEOUS at 23:37

## 2017-06-13 RX ADMIN — Medication 1 MILLIGRAM(S): at 08:40

## 2017-06-13 RX ADMIN — Medication 1 MILLIGRAM(S): at 13:30

## 2017-06-13 RX ADMIN — VASOPRESSIN 4 UNIT(S)/MIN: 20 INJECTION INTRAVENOUS at 01:30

## 2017-06-13 RX ADMIN — PROPOFOL 5 MICROGRAM(S)/KG/MIN: 10 INJECTION, EMULSION INTRAVENOUS at 23:37

## 2017-06-13 RX ADMIN — Medication 1 MILLIGRAM(S): at 14:47

## 2017-06-13 RX ADMIN — INSULIN HUMAN 3 UNIT(S)/HR: 100 INJECTION, SOLUTION SUBCUTANEOUS at 18:11

## 2017-06-13 RX ADMIN — Medication 10 MG/HR: at 10:58

## 2017-06-13 RX ADMIN — Medication 5 MILLIGRAM(S): at 23:29

## 2017-06-13 RX ADMIN — PROPOFOL 5 MICROGRAM(S)/KG/MIN: 10 INJECTION, EMULSION INTRAVENOUS at 15:18

## 2017-06-13 RX ADMIN — Medication 250 MILLIGRAM(S): at 20:17

## 2017-06-13 RX ADMIN — Medication 10 MG/HR: at 23:37

## 2017-06-13 RX ADMIN — HEPARIN SODIUM 5 UNIT(S)/HR: 5000 INJECTION INTRAVENOUS; SUBCUTANEOUS at 15:19

## 2017-06-13 RX ADMIN — Medication 80 MILLIGRAM(S): at 08:48

## 2017-06-13 RX ADMIN — Medication 200 MILLIGRAM(S): at 20:17

## 2017-06-13 RX ADMIN — Medication 5 MILLIGRAM(S): at 06:36

## 2017-06-13 RX ADMIN — Medication 14.06 MICROGRAM(S)/KG/MIN: at 01:31

## 2017-06-13 RX ADMIN — Medication 200 MILLIGRAM(S): at 10:01

## 2017-06-13 RX ADMIN — Medication 63.75 MILLIMOLE(S): at 00:22

## 2017-06-13 RX ADMIN — Medication 12.5 GRAM(S): at 07:45

## 2017-06-13 RX ADMIN — Medication 81 MILLIGRAM(S): at 12:38

## 2017-06-13 RX ADMIN — Medication 50 MILLIEQUIVALENT(S): at 01:00

## 2017-06-13 RX ADMIN — FLUCONAZOLE 100 MILLIGRAM(S): 150 TABLET ORAL at 08:30

## 2017-06-13 NOTE — PROGRESS NOTE ADULT - ASSESSMENT
67 year old man with ACC/AHA stage D congestive heart failure with severely reduced LV systolic function due to a nonischemic dilated cardiomyopathy s/p HM2 LVAD on 6/12 with post-operative course complicated by hemodynamically but self-terminating ventricular tachycardia. He was started on licodaine and amiodarone. His inotropes and vasopressors are gradually being weaned, but he remains intubated. He had possible seizure like activity this morning.

## 2017-06-13 NOTE — CHART NOTE - NSCHARTNOTEFT_GEN_A_CORE
Source: Patient [ ]    Family [ ]     other [X] comprehensive chart review    Diet : Pt with no active diet order      Patient reports [ ] nausea  [ ] vomiting [ ] diarrhea [ ] constipation  [ ]chewing problems [ ] swallowing issues  [X] other: none     PO intake:  < 50% [ ] 50-75% [ ]   % [ ]  other : not applicable, pt currently intubated     Source for PO intake [ ] Patient [ ] family [ ] chart [ ] staff [X] other, not applicable     Enteral /Parenteral Nutrition: not applicable    Admission Weight: (6/3) 72.9kg  Dosing Weight: () 75kg  Current Weight: () 71.2kg  % Weight Change: 2% loss from admission weight. However, weight fluctuations likely due to fluid shifts. No edema noted at this time.     Pertinent Medications: MEDICATIONS  (STANDING):  sodium chloride 0.9%. 1000milliLiter(s) IV Continuous <Continuous>  famotidine Injectable 20milliGRAM(s) IV Push daily  docusate sodium 100milliGRAM(s) Oral three times a day  insulin Infusion 3Unit(s)/Hr IV Continuous <Continuous>  dextrose 5%. 1000milliLiter(s) IV Continuous <Continuous>  dextrose 50% Injectable 12.5Gram(s) IV Push once  dextrose 50% Injectable 25Gram(s) IV Push once  dextrose 50% Injectable 25Gram(s) IV Push once  metoclopramide Injectable 5milliGRAM(s) IV Push every 8 hours  DOBUTamine Infusion 2.5MICROgram(s)/kG/Min IV Continuous <Continuous>  EPINEPHrine     Infusion 0.064MICROgram(s)/kG/Min IV Continuous <Continuous>  milrinone Infusion 0.375MICROgram(s)/kG/Min IV Continuous <Continuous>  norepinephrine Infusion 0.1MICROgram(s)/kG/Min IV Continuous <Continuous>  vasopressin Infusion 0.067Unit(s)/Min IV Continuous <Continuous>  ciprofloxacin   IVPB 400milliGRAM(s) IV Intermittent every 12 hours  fluconAZOLE IVPB 400milliGRAM(s) IV Intermittent every 24 hours  vancomycin  IVPB 1000milliGRAM(s) IV Intermittent every 12 hours  lidocaine   Infusion 2mG/Min IV Continuous <Continuous>  amiodarone Infusion 1mG/Min IV Continuous <Continuous>  dexmedetomidine Infusion 0.2MICROgram(s)/kG/Hr IV Continuous <Continuous>  dextrose 50% Injectable 12.5Gram(s) IV Push once  aspirin  chewable 81milliGRAM(s) Oral daily    MEDICATIONS  (PRN):  oxyCODONE  5 mG/acetaminophen 325 mG 1Tablet(s) Oral every 4 hours PRN Mild Pain  oxyCODONE  5 mG/acetaminophen 325 mG 2Tablet(s) Oral every 6 hours PRN Moderate Pain  meperidine     Injectable 25milliGRAM(s) IV Push once PRN For Shivering  dextrose Gel 1Dose(s) Oral once PRN Blood Glucose LESS THAN 70 milliGRAM(s)/deciLiter  glucagon  Injectable 1milliGRAM(s) IntraMuscular once PRN Glucose <70 milliGRAM(s)/deciLiter    Pertinent Labs:   Na133 mmol/L<L> Glu 191 mg/dL<H> K+ 4.8 mmol/L Cr  1.72 mg/dL<H> BUN 24 mg/dL<H> Phos n/a   Alb 3.4 g/dL PAB n/a     ()Hgb/Hct:9.7/30.7 <L>, Lactate:1.9, Cl:99, Ca:8.6, Total Protein:6.1, Total Bilirubin:5.1 <H>, AlkPhos:66, AST:208 <H>, ALT:70 <H>, LDH:1341 <H>  ()M.7 <L>, Phos:2 <L>  Arterial Line Glucose: (), ()134-199    Skin: No pressure ulcers noted.     Estimated Needs:   [ ] no change since previous assessment  [X] recalculated: based on IBW 67.1kg  7513-7311 kcal/day  93.9-107.4gm prot/day    Pt with CHF with reduced LV systolic function secondary to nonischemic dilated cardiomyopathy, now s/p LVAD implant, possible seizure like activity this morning as per chart.     Previous Nutrition Diagnosis:     [ ] Inadequate Energy Intake [ ]Inadequate Oral Intake [ ] Excessive Energy Intake     [ ] Underweight [ ] Increased Nutrient Needs [ ] Overweight/Obesity     [ ] Altered GI Function [ ] Unintended Weight Loss [ ] Food & Nutrition Related Knowledge Deficit [X] Malnutrition          Nutrition Diagnosis is [X] ongoing  [ ] resolved [ ] not applicable          New Nutrition Diagnosis: [ ] not applicable    [ ] Inadequate Protein Energy Intake [ ]Inadequate Oral Intake [ ] Excessive Energy Intake     [ ] Underweight [X] Increased Nutrient Needs [ ] Overweight/Obesity     [ ] Altered GI Function [ ] Unintended Weight Loss [ ] Food & Nutrition Related Knowledge Deficit[ ] Limited Adherence to nutrition related recommendations [ ] Malnutrition  [ ] other: Free text       Related to: increased demand for nutrients to promote postoperative healing     As evidenced by: pt now s/p LVAD implant via sternotomy     Interventions:     Recommend    [X] Change Diet To: When medically feasible recommend advance diet to consistent CHO clear liquids to consistent CHO, low sodium as tolerated.     [ ] Nutrition Supplement    [X] Nutrition Support: If PO intake remains contraindicated consider enteral nutrition support to Jevity1.2, recommend advance to goal rate 75ml/hr as tolerated to provide 2160kcal and 100gm prot (32kcal/kg and 1.5gm prot/kg per IBW 67.1kg)    [ ] Other:        Monitoring and Evaluation:     [X] PO intake [X] Tolerance to diet prescription [X] weights [X] follow up per protocol    [X] other: Trend glucose, electrolytes, renal indices, and LFTs; RD to follow up with diet education as appropriate.

## 2017-06-13 NOTE — CONSULT NOTE ADULT - ASSESSMENT
66yo  man PMH NICM with BiVHFrEF s/p AICD, pAfib on AC with hx of VT, HTN, HLD presents for management of CHF/LVAD evaluation; Neuro consulted for episode of b/l UE shaking with concern for seizures. Currently on exam, pt has normal tone and myoclonus at b/l ankles. Pt's symptoms resolved with Ativan and Propofol. Unclear if these episodes are seizures vs myoclonus. Would require an EEG to further evaluate and recommend CT head once medically stable. Continue with supportive care as per primary team. 66yo  man PMH NICM with BiVHFrEF s/p AICD, pAfib on AC with hx of VT, HTN, HLD presents for management of CHF/LVAD evaluation; Neuro consulted for episode of b/l UE shaking with concern for seizures. Currently on exam, pt has normal tone and myoclonus at b/l ankles. Pt's symptoms resolved with Ativan and Propofol. Unclear if these episodes are seizures vs myoclonus. However, given change in mental status and exam, concern for possible acute CNS process (eg, CVA vs hypoxemia) Would require an EEG to further evaluate and recommend CT head once medically stable. Continue with supportive care as per primary team.

## 2017-06-13 NOTE — OCCUPATIONAL THERAPY INITIAL EVALUATION ADULT - PLANNED THERAPY INTERVENTIONS, OT EVAL
bed mobility training/balance training/ADL retraining/fine motor coordination training/strengthening/ROM/transfer training

## 2017-06-13 NOTE — OCCUPATIONAL THERAPY INITIAL EVALUATION ADULT - PERTINENT HX OF CURRENT PROBLEM, REHAB EVAL
66y/o M with advanced NICM BiV HFrEF 15-20% on a stable dose of milrinone at 3mcg/kg/min via PICC s/p AICD, with replacement from [facility] to [employer] (2009), PAF on AC and h/o VT, with recent admission for AICD firing, now presenting with dizziness and nausea/vomiting X 1 Day. He says that he was doing well earlier in the day and felt like his usual self. Normally he only walks from his living room to the front steps and this afternoon he decided to walk around a city block, See below

## 2017-06-13 NOTE — PROGRESS NOTE ADULT - SUBJECTIVE AND OBJECTIVE BOX
CRITICAL CARE ATTENDING - CTICU    MEDICATIONS  (STANDING):  sodium chloride 0.9%. 1000milliLiter(s) IV Continuous <Continuous>  famotidine Injectable 20milliGRAM(s) IV Push daily  docusate sodium 100milliGRAM(s) Oral three times a day  insulin Infusion 3Unit(s)/Hr IV Continuous <Continuous>  dextrose 5%. 1000milliLiter(s) IV Continuous <Continuous>  dextrose 50% Injectable 12.5Gram(s) IV Push once  dextrose 50% Injectable 25Gram(s) IV Push once  dextrose 50% Injectable 25Gram(s) IV Push once  metoclopramide Injectable 5milliGRAM(s) IV Push every 8 hours  DOBUTamine Infusion 2.5MICROgram(s)/kG/Min IV Continuous <Continuous>  EPINEPHrine     Infusion 0.064MICROgram(s)/kG/Min IV Continuous <Continuous>  milrinone Infusion 0.375MICROgram(s)/kG/Min IV Continuous <Continuous>  norepinephrine Infusion 0.1MICROgram(s)/kG/Min IV Continuous <Continuous>  vasopressin Infusion 0.067Unit(s)/Min IV Continuous <Continuous>  ciprofloxacin   IVPB 400milliGRAM(s) IV Intermittent every 12 hours  fluconAZOLE IVPB 400milliGRAM(s) IV Intermittent every 24 hours  vancomycin  IVPB 1000milliGRAM(s) IV Intermittent every 12 hours  lidocaine   Infusion 2mG/Min IV Continuous <Continuous>  amiodarone Infusion 1mG/Min IV Continuous <Continuous>  dexmedetomidine Infusion 0.2MICROgram(s)/kG/Hr IV Continuous <Continuous>  levETIRAcetam  IVPB 500milliGRAM(s) IV Intermittent every 12 hours  aspirin  chewable 81milliGRAM(s) Oral daily  furosemide Infusion 20mG/Hr IV Continuous <Continuous>                                    9.7    18.3  )-----------( 101      ( 2017 02:13 )             30.7       06-13    133<L>  |  99  |  24<H>  ----------------------------<  191<H>  4.8   |  19<L>  |  1.72<H>    Ca    8.6      2017 02:13  Phos  2.0     06-12  Mg     2.7     06-12    TPro  6.1  /  Alb  3.4  /  TBili  5.1<H>  /  DBili  x   /  AST  208<H>  /  ALT  70<H>  /  AlkPhos  66        PT/INR - ( 2017 02:13 )   PT: 16.9 sec;   INR: 1.54 ratio         PTT - ( 2017 02:13 )  PTT:32.6 sec    Mode: AC/ CMV (Assist Control/ Continuous Mandatory Ventilation)  RR (machine): 18  TV (machine): 600  FiO2: 50  PEEP: 5  ITime: 1  MAP: 9  PIP: 20      Daily     Daily Weight in k.2 (2017 00:00)    I & Os for 24h ending  @ 07:00  =============================================  IN: 3087 ml / OUT: 2125 ml / NET: 962 ml    I & Os for current day (as of  @ 11:33)  =============================================  IN: 1105 ml / OUT: 170 ml / NET: 935 ml      Critically Ill patient  : [ ] preoperative ,   [x] post operative    Requires :  [x ] Arterial Line   [x ] Central Line  [x ] PA catheter  [ ] IABP  [ ] ECMO  [x ] LVAD  [ ] Ventilator  [ ] pacemaker [ ] Impella.    Bedside evaluation , monitoring , treatment of hemodynamics , fluids , IVP/ IVCD meds.        Diagnosis:     POD 1 LVAD Inplant    respiratory failure     Cardiogenic Shock     CHF- acute [x ]   chronic [ ]    systolic [x ]   diatolic [ ]          - Echo- EF - 15            [ ] RV dysfunction          - Cxr-cardiomegally, edema          - Clinical-  [x ]inotropes   [x]pressors   [x ]diuresis   [ ]IABP   [ ]ECMO   [x ]LVAD   [x ]Respiratory Failure    Renal Failure     Requires chest PT, pulmonary toilet, ambu bagging, suctioning to maintain SaO2,  patent airway and treat atelectasis.     Hemodynamic lability,instability. Requires IVCD [x ] vasopressors [x ] inotropes [x ]IVSS fluid  to maintain MAP, perfusion, C.I.     fluid overload      seizures vs. myoclonic movements - r/o CNS injuy    Birdsnest Cassidy catheter interpretation and therapeutic management of unstable hemodynamics     Ventilator Management:  [x ]AC-rest    [ ]CPAP-PS Wean    [ ]Trach Collar     [ ]Extubate    [ ] T-Piece  [x ]peep>5     V Tach s/p AICD-PPM          -               Discussed with CT surgeon, Physician's Assistant - Nurse Practitioner- Critical care medicine team.   Dicussed at  AM / PM rounds.   Chart, labs , films reviewed.    Total Time: 180 min

## 2017-06-13 NOTE — PROGRESS NOTE ADULT - SUBJECTIVE AND OBJECTIVE BOX
Operation / Date: POD#1 HM 2 LVAD Implant.    SUBJECTIVE ASSESSMENT:  67y Male s/p Heartmate 2 LVAD. Pt seen and examined and currently intubated on multiple inotropes and pressors.     Vital Signs Last 24 Hrs  T(C): 36.7, Max: 37.5 (06-12 @ 17:00)  T(F): 98.1, Max: 99.5 (06-12 @ 17:00)  HR: 98 (86 - 147)  BP: --  BP(mean): --  RR: 22 (12 - 31)  SpO2: 100% (79% - 100%)  I&O's Detail  I & Os for 24h ending 12 Jun 2017 07:00  =============================================  IN:    freetext medication -  Infusion: 351.5 ml    milrinone  Infusion: 324 ml    Packed Red Blood Cells: 260 ml    Oral Fluid: 240 ml    furosemide Infusion: 120 ml    IV PiggyBack: 100 ml    heparin Infusion: 20 ml    heparin Infusion: 4 ml    Total IN: 1419.5 ml  ---------------------------------------------  OUT:    Indwelling Catheter - Urethral: 6075 ml    Total OUT: 6075 ml  ---------------------------------------------  Total NET: -4655.5 ml    I & Os for current day (as of 13 Jun 2017 01:11)  =============================================  IN:    IV PiggyBack: 1112.5 ml    EPINEPHrine Infusion: 116 ml    sodium chloride 0.9%.: 110 ml    milrinone  Infusion: 92.4 ml    norepinephrine Infusion: 84.6 ml    insulin Infusion: 76.5 ml    DOBUTamine Infusion: 70.4 ml    DOBUTamine Infusion: 62.1 ml    vasopressin Infusion: 54 ml    norepinephrine Infusion: 52.1 ml    EPINEPHrine Infusion: 48.1 ml    amiodarone Infusion: 33.3 ml    EPINEPHrine Infusion: 32.1 ml    lidocaine   Infusion: 30 ml    EPINEPHrine Infusion: 12 ml    Total IN: 1986.1 ml  ---------------------------------------------  OUT:    Indwelling Catheter - Urethral: 1155 ml    Chest Tube: 220 ml    Chest Tube: 170 ml    Nasoenteral Tube: 100 ml    Chest Tube: 40 ml    Total OUT: 1685 ml  ---------------------------------------------  Total NET: 301.1 ml      CHEST TUBE:  Yes   EPICARDIAL WIRES: MIKEL HOBSON: Yes    PHYSICAL EXAM:    Neurological: Intubated and sedated, moves all extremities.  Cardiovascular: irregular rhythm  Respiratory: decreased breath sounds bilateral bases  Gastrointestinal: Soft, non-tender, positive bowel sounds  Extremities: Positive pulses, no edema  Incision Sites: Clean, dry and intact, driveline intact.    LABS:                        9.6    16.1  )-----------( 110      ( 12 Jun 2017 21:39 )             29.8     PT/INR - ( 12 Jun 2017 14:41 )   PT: 19.2 sec;   INR: 1.74 ratio         PTT - ( 12 Jun 2017 14:41 )  PTT:40.5 sec  06-12    135  |  97  |  24<H>  ----------------------------<  195<H>  4.7   |  16<L>  |  1.66<H>    Ca    8.6      12 Jun 2017 21:39  Phos  2.0     06-12  Mg     2.7     06-12    TPro  6.1  /  Alb  3.4  /  TBili  5.8<H>  /  DBili  x   /  AST  216<H>  /  ALT  70<H>  /  AlkPhos  65  06-12    ABG - ( 13 Jun 2017 00:05 )  pH: 7.39  /  pCO2: 30    /  pO2: 111   / HCO3: 18    / Base Excess: -5.7  /  SaO2: 99          MEDICATIONS  (STANDING):  famotidine Injectable 20milliGRAM(s) IV Push daily  aspirin enteric coated 81milliGRAM(s) Oral daily  docusate sodium 100milliGRAM(s) Oral three times a day  insulin Infusion 3Unit(s)/Hr IV Continuous <Continuous>  metoclopramide Injectable 5milliGRAM(s) IV Push every 8 hours  DOBUTamine Infusion 5MICROgram(s)/kG/Min IV Continuous <Continuous>  EPINEPHrine     Infusion 0.043MICROgram(s)/kG/Min IV Continuous <Continuous>  milrinone Infusion 0.375MICROgram(s)/kG/Min IV Continuous <Continuous>  norepinephrine Infusion 0.1MICROgram(s)/kG/Min IV Continuous <Continuous>  vasopressin Infusion 0.067Unit(s)/Min IV Continuous <Continuous>  ciprofloxacin   IVPB 400milliGRAM(s) IV Intermittent every 12 hours  fluconAZOLE IVPB 400milliGRAM(s) IV Intermittent every 24 hours  vancomycin  IVPB 1000milliGRAM(s) IV Intermittent every 12 hours  lidocaine   Infusion 2mG/Min IV Continuous <Continuous>  amiodarone Infusion 1mG/Min IV Continuous <Continuous>    MEDICATIONS  (PRN):  oxyCODONE  5 mG/acetaminophen 325 mG 1Tablet(s) Oral every 4 hours PRN Mild Pain  oxyCODONE  5 mG/acetaminophen 325 mG 2Tablet(s) Oral every 6 hours PRN Moderate Pain    67y Male s/p Heartmate 2 LVAD. Pt intubated, hemodynamically unstable multiple inotropes and pressors. Pt experienced multiple episodes of VT, requiring lidocaine and amiodarone.     Assessment/Plan  - Continue antiarrhythmics  - wean inotropes and pressors as tolerated  - Pain management  - Glucose control  - DVT/GI prophylaxis  - Patient discussed on morning interdisciplinary rounds with CTS team.

## 2017-06-13 NOTE — OCCUPATIONAL THERAPY INITIAL EVALUATION ADULT - LIVES WITH, PROFILE
alone/Pt lives alone in house, 3 steps to enter, stairs to negotiate indoors, tub in bathroom. Pt I in ADLs, states he sponge bathes, ambulate with cane

## 2017-06-13 NOTE — PROGRESS NOTE ADULT - PROBLEM SELECTOR PLAN 3
in setting of heart failure/ renal failure. Continue diuretics as per primary team. Monitor weights.

## 2017-06-13 NOTE — PROGRESS NOTE ADULT - PROBLEM SELECTOR PLAN 1
hemodynamically mediated in setting of decompensated heart failure +/- urinary retention component and now hypotension.  Pt with with elevated  Scr at 1.7 (from 1.6 yesterday).  Pt non -oliguric (UO 1430).  Check renal and bladder sonogram. Strict I/O, avoid nephrotoxics, NSAIDs, RCA. Renal dose of medications.

## 2017-06-13 NOTE — PROGRESS NOTE ADULT - PROBLEM SELECTOR PLAN 1
He is euvolemic but with moderately elevated JVP and acceptable PAD. We will give diuretics as necessary to keep ins equal to outs.

## 2017-06-13 NOTE — PROGRESS NOTE ADULT - SUBJECTIVE AND OBJECTIVE BOX
[facility] DIVISION OF KIDNEY DISEASES AND HYPERTENSION -- FOLLOW UP NOTE  --------------------------------------------------------------------------------  Chief Complaint: Heart failure      24hour events/Subjective: pt post op complicated by VT event and possible seizure. Pt remains intubated and requiring pressor  support.         PAST HISTORY  --------------------------------------------------------------------------------  No significant changes to PMH, PSH, FHx, SHx, unless otherwise noted    ALLERGIES & MEDICATIONS  --------------------------------------------------------------------------------  Allergies    No Known Allergies    Intolerances      Standing Inpatient Medications  sodium chloride 0.9%. 1000milliLiter(s) IV Continuous <Continuous>  famotidine Injectable 20milliGRAM(s) IV Push daily  docusate sodium 100milliGRAM(s) Oral three times a day  insulin Infusion 3Unit(s)/Hr IV Continuous <Continuous>  dextrose 5%. 1000milliLiter(s) IV Continuous <Continuous>  dextrose 50% Injectable 12.5Gram(s) IV Push once  dextrose 50% Injectable 25Gram(s) IV Push once  dextrose 50% Injectable 25Gram(s) IV Push once  metoclopramide Injectable 5milliGRAM(s) IV Push every 8 hours  DOBUTamine Infusion 2.5MICROgram(s)/kG/Min IV Continuous <Continuous>  EPINEPHrine     Infusion 0.064MICROgram(s)/kG/Min IV Continuous <Continuous>  milrinone Infusion 0.375MICROgram(s)/kG/Min IV Continuous <Continuous>  norepinephrine Infusion 0.1MICROgram(s)/kG/Min IV Continuous <Continuous>  vasopressin Infusion 0.067Unit(s)/Min IV Continuous <Continuous>  ciprofloxacin   IVPB 400milliGRAM(s) IV Intermittent every 12 hours  fluconAZOLE IVPB 400milliGRAM(s) IV Intermittent every 24 hours  vancomycin  IVPB 1000milliGRAM(s) IV Intermittent every 12 hours  lidocaine   Infusion 2mG/Min IV Continuous <Continuous>  amiodarone Infusion 1mG/Min IV Continuous <Continuous>  dexmedetomidine Infusion 0.2MICROgram(s)/kG/Hr IV Continuous <Continuous>  dextrose 50% Injectable 12.5Gram(s) IV Push once  levETIRAcetam  IVPB 1000milliGRAM(s) IV Intermittent once  levETIRAcetam  IVPB 500milliGRAM(s) IV Intermittent every 12 hours  aspirin  chewable 81milliGRAM(s) Oral daily  furosemide   Injectable 80milliGRAM(s) IV Push once  magnesium sulfate  IVPB 1Gram(s) IV Intermittent once    PRN Inpatient Medications  oxyCODONE  5 mG/acetaminophen 325 mG 1Tablet(s) Oral every 4 hours PRN  oxyCODONE  5 mG/acetaminophen 325 mG 2Tablet(s) Oral every 6 hours PRN  meperidine     Injectable 25milliGRAM(s) IV Push once PRN  dextrose Gel 1Dose(s) Oral once PRN  glucagon  Injectable 1milliGRAM(s) IntraMuscular once PRN      REVIEW OF SYSTEMS  --------------------------------------------------------------------------------  unable to obtain      VITALS/PHYSICAL EXAM  --------------------------------------------------------------------------------  T(C): 37.1, Max: 37.5 (06-12 @ 17:00)  HR: 109 (90 - 147)  BP: --  RR: 22 (12 - 40)  SpO2: 78% (65% - 100%)  Wt(kg): --  Height (cm): 172.7 (06-11-17 @ 17:13)  Weight (kg): 75 (06-11-17 @ 15:17)  BMI (kg/m2): 25.1 (06-11-17 @ 17:13)  BSA (m2): 1.88 (06-11-17 @ 17:13)    I & Os for 24h ending 06-13-17 @ 07:00  =============================================  IN: 3087 ml / OUT: 2125 ml / NET: 962 ml    I & Os for current day (as of 06-13-17 @ 09:21)  =============================================  IN: 110.6 ml / OUT: 55 ml / NET: 55.6 ml    Physical Exam:  	Gen: male, NAD  	HEENT: +ETT  	Pulm: Mechanical breath sounds  	CV: S1S2  	Abd: Soft, +BS  	Ext: No LE edema B/L                      Neuro: Sedated  	Skin: Warm and Dry   	Other : + emily    LABS/STUDIES  --------------------------------------------------------------------------------              9.7    18.3  >-----------<  101      [06-13-17 @ 02:13]              30.7     133  |  99  |  24  ----------------------------<  191      [06-13-17 @ 02:13]  4.8   |  19  |  1.72        Ca     8.6     [06-13-17 @ 02:13]      Mg     2.7     [06-12-17 @ 21:39]      Phos  2.0     [06-12-17 @ 21:39]    TPro  6.1  /  Alb  3.4  /  TBili  5.1  /  DBili  x   /  AST  208  /  ALT  70  /  AlkPhos  66  [06-13-17 @ 02:13]    PT/INR: PT 16.9 , INR 1.54       [06-13-17 @ 02:13]  PTT: 32.6       [06-13-17 @ 02:13]    Troponin 0.75      [06-12-17 @ 14:41]        [06-12-17 @ 14:41]  LDH 1341      [06-13-17 @ 02:13]    Creatinine Trend:  SCr 1.72 [06-13 @ 02:13]  SCr 1.66 [06-12 @ 21:39]  SCr 1.42 [06-12 @ 14:41]  SCr 1.44 [06-12 @ 02:34]  SCr 1.40 [06-11 @ 21:18]    Urinalysis - [06-03-17 @ 10:46]      Color Mary / Appearance Clear / SG 1.012 / pH 5.0      Gluc Negative / Ketone Negative  / Bili Negative / Urobili 1.0       Blood Negative / Protein Negative / Leuk Est Negative / Nitrite Negative      RBC 2 / WBC 1 / Hyaline 6 / Gran  / Sq Epi  / Non Sq Epi 0 / Bacteria Negative      Iron 20, TIBC 352, %sat 6      [06-08-17 @ 07:14]  Ferritin 121.0      [06-09-17 @ 19:08]  HbA1c 5.5      [06-07-17 @ 06:14]  TSH 3.54      [06-07-17 @ 06:14]  Lipid: chol 62, TG 33, HDL 17, LDL 38      [06-07-17 @ 06:14]    HBsAb Nonreact      [05-15-17 @ 15:40]  HBsAg Nonreact      [05-15-17 @ 15:40]  HBcAb Nonreact      [05-15-17 @ 15:40]  HCV 0.17, Nonreact      [05-15-17 @ 15:40]  HIV Nonreact      [05-15-17 @ 13:47] [facility] DIVISION OF KIDNEY DISEASES AND HYPERTENSION -- FOLLOW UP NOTE  --------------------------------------------------------------------------------  Chief Complaint: Heart failure      24hour events/Subjective: pt post op complicated by VT event and possible seizure. Pt remains intubated and requiring pressor  support.         PAST HISTORY  --------------------------------------------------------------------------------  No significant changes to PMH, PSH, FHx, SHx, unless otherwise noted    ALLERGIES & MEDICATIONS  --------------------------------------------------------------------------------  Allergies    No Known Allergies    Intolerances      Standing Inpatient Medications  sodium chloride 0.9%. 1000milliLiter(s) IV Continuous <Continuous>  famotidine Injectable 20milliGRAM(s) IV Push daily  docusate sodium 100milliGRAM(s) Oral three times a day  insulin Infusion 3Unit(s)/Hr IV Continuous <Continuous>  dextrose 5%. 1000milliLiter(s) IV Continuous <Continuous>  dextrose 50% Injectable 12.5Gram(s) IV Push once  dextrose 50% Injectable 25Gram(s) IV Push once  dextrose 50% Injectable 25Gram(s) IV Push once  metoclopramide Injectable 5milliGRAM(s) IV Push every 8 hours  DOBUTamine Infusion 2.5MICROgram(s)/kG/Min IV Continuous <Continuous>  EPINEPHrine     Infusion 0.064MICROgram(s)/kG/Min IV Continuous <Continuous>  milrinone Infusion 0.375MICROgram(s)/kG/Min IV Continuous <Continuous>  norepinephrine Infusion 0.1MICROgram(s)/kG/Min IV Continuous <Continuous>  vasopressin Infusion 0.067Unit(s)/Min IV Continuous <Continuous>  ciprofloxacin   IVPB 400milliGRAM(s) IV Intermittent every 12 hours  fluconAZOLE IVPB 400milliGRAM(s) IV Intermittent every 24 hours  vancomycin  IVPB 1000milliGRAM(s) IV Intermittent every 12 hours  lidocaine   Infusion 2mG/Min IV Continuous <Continuous>  amiodarone Infusion 1mG/Min IV Continuous <Continuous>  dexmedetomidine Infusion 0.2MICROgram(s)/kG/Hr IV Continuous <Continuous>  dextrose 50% Injectable 12.5Gram(s) IV Push once  levETIRAcetam  IVPB 1000milliGRAM(s) IV Intermittent once  levETIRAcetam  IVPB 500milliGRAM(s) IV Intermittent every 12 hours  aspirin  chewable 81milliGRAM(s) Oral daily  furosemide   Injectable 80milliGRAM(s) IV Push once  magnesium sulfate  IVPB 1Gram(s) IV Intermittent once    PRN Inpatient Medications  oxyCODONE  5 mG/acetaminophen 325 mG 1Tablet(s) Oral every 4 hours PRN  oxyCODONE  5 mG/acetaminophen 325 mG 2Tablet(s) Oral every 6 hours PRN  meperidine     Injectable 25milliGRAM(s) IV Push once PRN  dextrose Gel 1Dose(s) Oral once PRN  glucagon  Injectable 1milliGRAM(s) IntraMuscular once PRN      REVIEW OF SYSTEMS  --------------------------------------------------------------------------------  unable to obtain      VITALS/PHYSICAL EXAM  --------------------------------------------------------------------------------  T(C): 37.1, Max: 37.5 (06-12 @ 17:00)  HR: 109 (90 - 147)  BP: --  RR: 22 (12 - 40)  SpO2: 78% (65% - 100%)  Wt(kg): --  Height (cm): 172.7 (06-11-17 @ 17:13)  Weight (kg): 75 (06-11-17 @ 15:17)  BMI (kg/m2): 25.1 (06-11-17 @ 17:13)  BSA (m2): 1.88 (06-11-17 @ 17:13)    I & Os for 24h ending 06-13-17 @ 07:00  =============================================  IN: 3087 ml / OUT: 2125 ml / NET: 962 ml    I & Os for current day (as of 06-13-17 @ 09:21)  =============================================  IN: 110.6 ml / OUT: 55 ml / NET: 55.6 ml    Physical Exam:  	Gen: male, NAD  	HEENT: +ETT  	Pulm: Mechanical breath sounds  	CV: S1S2  	Abd: Soft, +BS  	Ext: No LE edema B/L                      Neuro: Sedated  	Skin: Warm and Dry   	Other : + emily, +LVAD    LABS/STUDIES  --------------------------------------------------------------------------------              9.7    18.3  >-----------<  101      [06-13-17 @ 02:13]              30.7     133  |  99  |  24  ----------------------------<  191      [06-13-17 @ 02:13]  4.8   |  19  |  1.72        Ca     8.6     [06-13-17 @ 02:13]      Mg     2.7     [06-12-17 @ 21:39]      Phos  2.0     [06-12-17 @ 21:39]    TPro  6.1  /  Alb  3.4  /  TBili  5.1  /  DBili  x   /  AST  208  /  ALT  70  /  AlkPhos  66  [06-13-17 @ 02:13]    PT/INR: PT 16.9 , INR 1.54       [06-13-17 @ 02:13]  PTT: 32.6       [06-13-17 @ 02:13]    Troponin 0.75      [06-12-17 @ 14:41]        [06-12-17 @ 14:41]  LDH 1341      [06-13-17 @ 02:13]    Creatinine Trend:  SCr 1.72 [06-13 @ 02:13]  SCr 1.66 [06-12 @ 21:39]  SCr 1.42 [06-12 @ 14:41]  SCr 1.44 [06-12 @ 02:34]  SCr 1.40 [06-11 @ 21:18]    Urinalysis - [06-03-17 @ 10:46]      Color Mary / Appearance Clear / SG 1.012 / pH 5.0      Gluc Negative / Ketone Negative  / Bili Negative / Urobili 1.0       Blood Negative / Protein Negative / Leuk Est Negative / Nitrite Negative      RBC 2 / WBC 1 / Hyaline 6 / Gran  / Sq Epi  / Non Sq Epi 0 / Bacteria Negative      Iron 20, TIBC 352, %sat 6      [06-08-17 @ 07:14]  Ferritin 121.0      [06-09-17 @ 19:08]  HbA1c 5.5      [06-07-17 @ 06:14]  TSH 3.54      [06-07-17 @ 06:14]  Lipid: chol 62, TG 33, HDL 17, LDL 38      [06-07-17 @ 06:14]    HBsAb Nonreact      [05-15-17 @ 15:40]  HBsAg Nonreact      [05-15-17 @ 15:40]  HBcAb Nonreact      [05-15-17 @ 15:40]  HCV 0.17, Nonreact      [05-15-17 @ 15:40]  HIV Nonreact      [05-15-17 @ 13:47]

## 2017-06-13 NOTE — CONSULT NOTE ADULT - SUBJECTIVE AND OBJECTIVE BOX
68yo man PMH NICM with BiVHFrEF s/p AICD, pAfib on AC with hx of VT, HTN, HLD presents for management of CHF/LVAD evaluation; Neuro consulted for episode of b/l UE shaking with concern for seizures. Pt was admitted on 06/02/17 for CHF/LVAD evaluation. Hospital course complicated by NORMAN and multiple events of VT. Pt received LVAD on 06/13. This morning, pt was noted to have episodes of b/l UE shaking.       PAST MEDICAL & SURGICAL HISTORY:  H/O prior ablation treatment: for Afib  Ventricular fibrillation: s/p AICD  PAF (paroxysmal atrial fibrillation): on xarelto  Non-Ischemic Cardiomyopathy  SVT (Supraventricular Tachycardia)  HTN  CHF (Congestive Heart Failure)  Status post left hip replacement  Hypertension  Hypertension  History of Prior Ablation Treatment: for afib  AICD (Automatic Cardioverter/Defibrillator) Present: [employer] with 1 [employer] lead4/1/09- explanted and replaced with [employer] 2 leads on 9/2/09      Allergies  No Known Allergies      MEDICATIONS  (STANDING):  sodium chloride 0.9%. 1000milliLiter(s) IV Continuous <Continuous>  famotidine Injectable 20milliGRAM(s) IV Push daily  docusate sodium 100milliGRAM(s) Oral three times a day  insulin Infusion 3Unit(s)/Hr IV Continuous <Continuous>  dextrose 5%. 1000milliLiter(s) IV Continuous <Continuous>  dextrose 50% Injectable 12.5Gram(s) IV Push once  dextrose 50% Injectable 25Gram(s) IV Push once  dextrose 50% Injectable 25Gram(s) IV Push once  metoclopramide Injectable 5milliGRAM(s) IV Push every 8 hours  DOBUTamine Infusion 2.5MICROgram(s)/kG/Min IV Continuous <Continuous>  EPINEPHrine     Infusion 0.064MICROgram(s)/kG/Min IV Continuous <Continuous>  milrinone Infusion 0.375MICROgram(s)/kG/Min IV Continuous <Continuous>  norepinephrine Infusion 0.1MICROgram(s)/kG/Min IV Continuous <Continuous>  vasopressin Infusion 0.067Unit(s)/Min IV Continuous <Continuous>  ciprofloxacin   IVPB 400milliGRAM(s) IV Intermittent every 12 hours  fluconAZOLE IVPB 400milliGRAM(s) IV Intermittent every 24 hours  vancomycin  IVPB 1000milliGRAM(s) IV Intermittent every 12 hours  lidocaine   Infusion 2mG/Min IV Continuous <Continuous>  amiodarone Infusion 1mG/Min IV Continuous <Continuous>  dexmedetomidine Infusion 0.2MICROgram(s)/kG/Hr IV Continuous <Continuous>  dextrose 50% Injectable 12.5Gram(s) IV Push once  aspirin  chewable 81milliGRAM(s) Oral daily    MEDICATIONS  (PRN):  oxyCODONE  5 mG/acetaminophen 325 mG 1Tablet(s) Oral every 4 hours PRN Mild Pain  oxyCODONE  5 mG/acetaminophen 325 mG 2Tablet(s) Oral every 6 hours PRN Moderate Pain  meperidine     Injectable 25milliGRAM(s) IV Push once PRN For Shivering  dextrose Gel 1Dose(s) Oral once PRN Blood Glucose LESS THAN 70 milliGRAM(s)/deciLiter  glucagon  Injectable 1milliGRAM(s) IntraMuscular once PRN Glucose <70 milliGRAM(s)/deciLiter      FAMILY HISTORY:  No pertinent family history in first degree relatives      Social History: Denies tob, EtOH use     Physical Exam:   Vital Signs Last 24 Hrs  T(C): 37.1, Max: 37.5 (06-12 @ 17:00)  T(F): 98.8, Max: 99.5 (06-12 @ 17:00)  HR: 99 (90 - 147)  BP: --  BP(mean): --  RR: 18 (12 - 40)  SpO2: 100% (65% - 100%)    General Exam:   General appearance: No acute distress                 Neurological Exam:  Mental Status: AAOx3, fluent speech, follows simple commands, able to name  Cranial Nerves: EOMI, PERRL, VFF, V1-V3 intact, facial symmetric, no dysarthria, tongue midline  Motor: strength 5/5 throughout. No drift x4  Sensation: Intact to LT throughout  Coordination: FTN intact b/l  Reflexes: 1+ bilateral biceps, brachioradialis, patellar and ankle  Gait: normal and stable.      Labs:     CBC Full  -  ( 13 Jun 2017 02:13 )  WBC Count : 18.3 K/uL  Hemoglobin : 9.7 g/dL  Hematocrit : 30.7 %  Platelet Count - Automated : 101 K/uL  Mean Cell Volume : 87.5 fl  Mean Cell Hemoglobin : 27.7 pg  Mean Cell Hemoglobin Concentration : 31.6 gm/dL  Auto Neutrophil # : x  Auto Lymphocyte # : x  Auto Monocyte # : x  Auto Eosinophil # : x  Auto Basophil # : x  Auto Neutrophil % : x  Auto Lymphocyte % : x  Auto Monocyte % : x  Auto Eosinophil % : x  Auto Basophil % : x    06-13    133<L>  |  99  |  24<H>  ----------------------------<  191<H>  4.8   |  19<L>  |  1.72<H>    Ca    8.6      13 Jun 2017 02:13  Phos  2.0     06-12  Mg     2.7     06-12    TPro  6.1  /  Alb  3.4  /  TBili  5.1<H>  /  DBili  x   /  AST  208<H>  /  ALT  70<H>  /  AlkPhos  66  06-13    LIVER FUNCTIONS - ( 13 Jun 2017 02:13 )  Alb: 3.4 g/dL / Pro: 6.1 g/dL / ALK PHOS: 66 U/L / ALT: 70 U/L RC / AST: 208 U/L / GGT: x           PT/INR - ( 13 Jun 2017 02:13 )   PT: 16.9 sec;   INR: 1.54 ratio         PTT - ( 13 Jun 2017 02:13 )  PTT:32.6 sec      Imaging:     CT Head:     MRI        A/P: 66yo man PMH NICM with BiVHFrEF s/p AICD, pAfib on AC with hx of VT, HTN, HLD presents for management of CHF/LVAD evaluation; Neuro consulted for episode of b/l UE shaking with concern for seizures. Pt was admitted on 06/02/17 for CHF/LVAD evaluation. Hospital course complicated by NORMAN and multiple events of VT. Pt received LVAD on 06/13. Per pt's nurse, pt was drowsy but following commands overnight. At 5AM this morning, pt was noted to be more lethargic. At around 7AM, pt was found to have twitching involving b/l shoulders and arms which would resolve after a bolus of propofol. The movements would return after the propofol wears off.       PAST MEDICAL & SURGICAL HISTORY:  H/O prior ablation treatment: for Afib  Ventricular fibrillation: s/p AICD  PAF (paroxysmal atrial fibrillation): on xarelto  Non-Ischemic Cardiomyopathy  SVT (Supraventricular Tachycardia)  HTN  CHF (Congestive Heart Failure)  Status post left hip replacement  Hypertension  Hypertension  History of Prior Ablation Treatment: for afib  AICD (Automatic Cardioverter/Defibrillator) Present: [employer] with 1 [employer] lead4/1/09- explanted and replaced with [employer] 2 leads on 9/2/09      Allergies  No Known Allergies      MEDICATIONS  (STANDING):  sodium chloride 0.9%. 1000milliLiter(s) IV Continuous <Continuous>  famotidine Injectable 20milliGRAM(s) IV Push daily  docusate sodium 100milliGRAM(s) Oral three times a day  insulin Infusion 3Unit(s)/Hr IV Continuous <Continuous>  dextrose 5%. 1000milliLiter(s) IV Continuous <Continuous>  dextrose 50% Injectable 12.5Gram(s) IV Push once  dextrose 50% Injectable 25Gram(s) IV Push once  dextrose 50% Injectable 25Gram(s) IV Push once  metoclopramide Injectable 5milliGRAM(s) IV Push every 8 hours  DOBUTamine Infusion 2.5MICROgram(s)/kG/Min IV Continuous <Continuous>  EPINEPHrine     Infusion 0.064MICROgram(s)/kG/Min IV Continuous <Continuous>  milrinone Infusion 0.375MICROgram(s)/kG/Min IV Continuous <Continuous>  norepinephrine Infusion 0.1MICROgram(s)/kG/Min IV Continuous <Continuous>  vasopressin Infusion 0.067Unit(s)/Min IV Continuous <Continuous>  ciprofloxacin   IVPB 400milliGRAM(s) IV Intermittent every 12 hours  fluconAZOLE IVPB 400milliGRAM(s) IV Intermittent every 24 hours  vancomycin  IVPB 1000milliGRAM(s) IV Intermittent every 12 hours  lidocaine   Infusion 2mG/Min IV Continuous <Continuous>  amiodarone Infusion 1mG/Min IV Continuous <Continuous>  dexmedetomidine Infusion 0.2MICROgram(s)/kG/Hr IV Continuous <Continuous>  dextrose 50% Injectable 12.5Gram(s) IV Push once  aspirin  chewable 81milliGRAM(s) Oral daily    MEDICATIONS  (PRN):  oxyCODONE  5 mG/acetaminophen 325 mG 1Tablet(s) Oral every 4 hours PRN Mild Pain  oxyCODONE  5 mG/acetaminophen 325 mG 2Tablet(s) Oral every 6 hours PRN Moderate Pain  meperidine     Injectable 25milliGRAM(s) IV Push once PRN For Shivering  dextrose Gel 1Dose(s) Oral once PRN Blood Glucose LESS THAN 70 milliGRAM(s)/deciLiter  glucagon  Injectable 1milliGRAM(s) IntraMuscular once PRN Glucose <70 milliGRAM(s)/deciLiter      FAMILY HISTORY:  No pertinent family history in first degree relatives      Social History: Denies tob, EtOH use     Physical Exam:   Vital Signs Last 24 Hrs  T(C): 37.1, Max: 37.5 (06-12 @ 17:00)  T(F): 98.8, Max: 99.5 (06-12 @ 17:00)  HR: 99 (90 - 147)  BP: --  BP(mean): --  RR: 18 (12 - 40)  SpO2: 100% (65% - 100%)    General Exam:   General appearance: No acute distress                 Neurological Exam:  Mental Status: AAOx3, fluent speech, follows simple commands, able to name  Cranial Nerves: EOMI, PERRL, VFF, V1-V3 intact, facial symmetric, no dysarthria, tongue midline  Motor: strength 5/5 throughout. No drift x4  Sensation: Intact to LT throughout  Coordination: FTN intact b/l  Reflexes: 1+ bilateral biceps, brachioradialis, patellar and ankle  Gait: normal and stable.      Labs:     CBC Full  -  ( 13 Jun 2017 02:13 )  WBC Count : 18.3 K/uL  Hemoglobin : 9.7 g/dL  Hematocrit : 30.7 %  Platelet Count - Automated : 101 K/uL  Mean Cell Volume : 87.5 fl  Mean Cell Hemoglobin : 27.7 pg  Mean Cell Hemoglobin Concentration : 31.6 gm/dL  Auto Neutrophil # : x  Auto Lymphocyte # : x  Auto Monocyte # : x  Auto Eosinophil # : x  Auto Basophil # : x  Auto Neutrophil % : x  Auto Lymphocyte % : x  Auto Monocyte % : x  Auto Eosinophil % : x  Auto Basophil % : x    06-13    133<L>  |  99  |  24<H>  ----------------------------<  191<H>  4.8   |  19<L>  |  1.72<H>    Ca    8.6      13 Jun 2017 02:13  Phos  2.0     06-12  Mg     2.7     06-12    TPro  6.1  /  Alb  3.4  /  TBili  5.1<H>  /  DBili  x   /  AST  208<H>  /  ALT  70<H>  /  AlkPhos  66  06-13    LIVER FUNCTIONS - ( 13 Jun 2017 02:13 )  Alb: 3.4 g/dL / Pro: 6.1 g/dL / ALK PHOS: 66 U/L / ALT: 70 U/L RC / AST: 208 U/L / GGT: x           PT/INR - ( 13 Jun 2017 02:13 )   PT: 16.9 sec;   INR: 1.54 ratio         PTT - ( 13 Jun 2017 02:13 )  PTT:32.6 sec      Imaging:     CT Head:     MRI        A/P: 66yo  man PMH NICM with BiVHFrEF s/p AICD, pAfib on AC with hx of VT, HTN, HLD presents for management of CHF/LVAD evaluation; Neuro consulted for episode of b/l UE shaking with concern for seizures. Pt was admitted on 06/02/17 for CHF/LVAD evaluation. Hospital course complicated by NORMAN and multiple events of VT. Pt received LVAD on 06/13. Per pt's nurse, pt was drowsy but following commands overnight. At 5AM this morning, pt was noted to be more lethargic. At around 7AM, pt was found to have twitching involving b/l shoulders and arms which would resolve after a bolus of propofol. The movements would return after the propofol wears off.       PAST MEDICAL & SURGICAL HISTORY:  H/O prior ablation treatment: for Afib  Ventricular fibrillation: s/p AICD  PAF (paroxysmal atrial fibrillation): on xarelto  Non-Ischemic Cardiomyopathy  SVT (Supraventricular Tachycardia)  HTN  CHF (Congestive Heart Failure)  Status post left hip replacement  Hypertension  Hypertension  History of Prior Ablation Treatment: for afib  AICD (Automatic Cardioverter/Defibrillator) Present: [employer] with 1 [employer] lead4/1/09- explanted and replaced with [employer] 2 leads on 9/2/09      Allergies  No Known Allergies      MEDICATIONS  (STANDING):  sodium chloride 0.9%. 1000milliLiter(s) IV Continuous <Continuous>  famotidine Injectable 20milliGRAM(s) IV Push daily  docusate sodium 100milliGRAM(s) Oral three times a day  insulin Infusion 3Unit(s)/Hr IV Continuous <Continuous>  dextrose 5%. 1000milliLiter(s) IV Continuous <Continuous>  dextrose 50% Injectable 12.5Gram(s) IV Push once  dextrose 50% Injectable 25Gram(s) IV Push once  dextrose 50% Injectable 25Gram(s) IV Push once  metoclopramide Injectable 5milliGRAM(s) IV Push every 8 hours  DOBUTamine Infusion 2.5MICROgram(s)/kG/Min IV Continuous <Continuous>  EPINEPHrine     Infusion 0.064MICROgram(s)/kG/Min IV Continuous <Continuous>  milrinone Infusion 0.375MICROgram(s)/kG/Min IV Continuous <Continuous>  norepinephrine Infusion 0.1MICROgram(s)/kG/Min IV Continuous <Continuous>  vasopressin Infusion 0.067Unit(s)/Min IV Continuous <Continuous>  ciprofloxacin   IVPB 400milliGRAM(s) IV Intermittent every 12 hours  fluconAZOLE IVPB 400milliGRAM(s) IV Intermittent every 24 hours  vancomycin  IVPB 1000milliGRAM(s) IV Intermittent every 12 hours  lidocaine   Infusion 2mG/Min IV Continuous <Continuous>  amiodarone Infusion 1mG/Min IV Continuous <Continuous>  dexmedetomidine Infusion 0.2MICROgram(s)/kG/Hr IV Continuous <Continuous>  dextrose 50% Injectable 12.5Gram(s) IV Push once  aspirin  chewable 81milliGRAM(s) Oral daily    MEDICATIONS  (PRN):  oxyCODONE  5 mG/acetaminophen 325 mG 1Tablet(s) Oral every 4 hours PRN Mild Pain  oxyCODONE  5 mG/acetaminophen 325 mG 2Tablet(s) Oral every 6 hours PRN Moderate Pain  meperidine     Injectable 25milliGRAM(s) IV Push once PRN For Shivering  dextrose Gel 1Dose(s) Oral once PRN Blood Glucose LESS THAN 70 milliGRAM(s)/deciLiter  glucagon  Injectable 1milliGRAM(s) IntraMuscular once PRN Glucose <70 milliGRAM(s)/deciLiter      FAMILY HISTORY:  No pertinent family history in first degree relatives    Social History: Former tob, EtOH use    Physical Exam:   Vital Signs Last 24 Hrs  T(C): 37.1, Max: 37.5 (06-12 @ 17:00)  T(F): 98.8, Max: 99.5 (06-12 @ 17:00)  HR: 99 (90 - 147)  BP: --  BP(mean): --  RR: 18 (12 - 40)  SpO2: 100% (65% - 100%)    Exam:   intubated, on sedation, not responsive to verbal/tactile/noxious stimuli, does not follow simple commands  negative oculocephalics, pupils b/l sluggish    During event:   b/l slight elevation of shoulders w/ possible rhythmic increased tone in b/l UE (R>L), increased tone in b/l LE  +clonus in b/l wrists and ankles     After event:  normal tone x4  no withdrawal to pain x4  + clonus at b/l ankles    Labs:     CBC Full  -  ( 13 Jun 2017 02:13 )  WBC Count : 18.3 K/uL  Hemoglobin : 9.7 g/dL  Hematocrit : 30.7 %  Platelet Count - Automated : 101 K/uL  Mean Cell Volume : 87.5 fl  Mean Cell Hemoglobin : 27.7 pg  Mean Cell Hemoglobin Concentration : 31.6 gm/dL  Auto Neutrophil # : x  Auto Lymphocyte # : x  Auto Monocyte # : x  Auto Eosinophil # : x  Auto Basophil # : x  Auto Neutrophil % : x  Auto Lymphocyte % : x  Auto Monocyte % : x  Auto Eosinophil % : x  Auto Basophil % : x    06-13    133<L>  |  99  |  24<H>  ----------------------------<  191<H>  4.8   |  19<L>  |  1.72<H>    Ca    8.6      13 Jun 2017 02:13  Phos  2.0     06-12  Mg     2.7     06-12    TPro  6.1  /  Alb  3.4  /  TBili  5.1<H>  /  DBili  x   /  AST  208<H>  /  ALT  70<H>  /  AlkPhos  66  06-13    LIVER FUNCTIONS - ( 13 Jun 2017 02:13 )  Alb: 3.4 g/dL / Pro: 6.1 g/dL / ALK PHOS: 66 U/L / ALT: 70 U/L RC / AST: 208 U/L / GGT: x           PT/INR - ( 13 Jun 2017 02:13 )   PT: 16.9 sec;   INR: 1.54 ratio      PTT - ( 13 Jun 2017 02:13 )  PTT:32.6 sec

## 2017-06-13 NOTE — OCCUPATIONAL THERAPY INITIAL EVALUATION ADULT - ADDITIONAL COMMENTS
which is significantly longer than he normally walks for. A while after that he developed nausea and vomited NBNB 3x and felt dizzy. He says that his normal BP is around 90 systolic.  Patient initially presented to [facility], seen by cardiology. Labs noteable for pro bnp [id number], Trop T 0.08, CK 50, hyponatremia to 133 (from 135 two weeks prior), INR elevated at 4.36, Cr 2.07 from BL 0.9-1, BR 1.8. EKG showing NSR with first degree block. Transfer from [facility] for management of ADHF/ LVAD eval which is significantly longer than he normally walks for. A while after that he developed nausea and vomited NBNB 3x and felt dizzy. He says that his normal BP is around 90 systolic.  Patient initially presented to [facility], seen by cardiology. Labs noteable for pro bnp [id number], Trop T 0.08, CK 50, hyponatremia to 133 (from 135 two weeks prior), INR elevated at 4.36, Cr 2.07 from BL 0.9-1, BR 1.8. EKG showing NSR with first degree block. Transfer from [facility] for management of ADHF/ LVAD eval.  Pt is s/p 6/12 implant of L LVAD

## 2017-06-13 NOTE — PROGRESS NOTE ADULT - ASSESSMENT
68y/o M with advanced NICM BiV HFrEF 15-20% on a stable dose of milrinone at 3mcg/kg/min via PICC s/p AICD, with replacement from [employer] to [employer] (2009), PAF on AC and h/o VT, with recent admission for AICD firing, with decompensated heart failure s/p LVAD on 6/12 post op with VT

## 2017-06-13 NOTE — PROGRESS NOTE ADULT - SUBJECTIVE AND OBJECTIVE BOX
VAD Implanted yesterday. He continues intubated. Overnight VT requiring amiodarone and lidocaine. Possible seizure like activity this morning. No significant bleeding.     Medications:  sodium chloride 0.9%. 1000milliLiter(s) IV Continuous <Continuous>  oxyCODONE  5 mG/acetaminophen 325 mG 1Tablet(s) Oral every 4 hours PRN  oxyCODONE  5 mG/acetaminophen 325 mG 2Tablet(s) Oral every 6 hours PRN  meperidine     Injectable 25milliGRAM(s) IV Push once PRN  famotidine Injectable 20milliGRAM(s) IV Push daily  docusate sodium 100milliGRAM(s) Oral three times a day  insulin Infusion 3Unit(s)/Hr IV Continuous <Continuous>  dextrose 5%. 1000milliLiter(s) IV Continuous <Continuous>  dextrose Gel 1Dose(s) Oral once PRN  dextrose 50% Injectable 12.5Gram(s) IV Push once  dextrose 50% Injectable 25Gram(s) IV Push once  dextrose 50% Injectable 25Gram(s) IV Push once  glucagon  Injectable 1milliGRAM(s) IntraMuscular once PRN  metoclopramide Injectable 5milliGRAM(s) IV Push every 8 hours  DOBUTamine Infusion 2.5MICROgram(s)/kG/Min IV Continuous <Continuous>  EPINEPHrine     Infusion 0.064MICROgram(s)/kG/Min IV Continuous <Continuous>  milrinone Infusion 0.375MICROgram(s)/kG/Min IV Continuous <Continuous>  norepinephrine Infusion 0.1MICROgram(s)/kG/Min IV Continuous <Continuous>  vasopressin Infusion 0.067Unit(s)/Min IV Continuous <Continuous>  ciprofloxacin   IVPB 400milliGRAM(s) IV Intermittent every 12 hours  fluconAZOLE IVPB 400milliGRAM(s) IV Intermittent every 24 hours  vancomycin  IVPB 1000milliGRAM(s) IV Intermittent every 12 hours  lidocaine   Infusion 2mG/Min IV Continuous <Continuous>  amiodarone Infusion 1mG/Min IV Continuous <Continuous>  dexmedetomidine Infusion 0.2MICROgram(s)/kG/Hr IV Continuous <Continuous>  dextrose 50% Injectable 12.5Gram(s) IV Push once      Vitals:  T(C): 37.1, Max: 37.5 (06-12 @ 17:00)  HR: 94 (90 - 147)  BP: --  BP(mean): --  ABP: 73/66 (62/51 - 104/86)  ABP(mean): 68 (57 - 101)  RR: 18 (12 - 40)  SpO2: 100% (65% - 100%)  CVP(cm H2O): 15  CI: 3.5 (1.9 - 5)  PA: 33/19 (30/18 - 64/26)  PA(mean): 26 (24 - 41)    Vital Signs Last 24 Hrs  T(C): 37.1, Max: 37.5 (06-12 @ 17:00)  T(F): 98.8, Max: 99.5 (06-12 @ 17:00)  HR: 94 (90 - 147)  BP: --  BP(mean): --  RR: 18 (12 - 40)  SpO2: 100% (65% - 100%)    Daily     Daily Weight in k.2 (2017 00:00)    I&O's Summary  I & Os for 24h ending 2017 07:00  =============================================  IN: 3087 ml / OUT: 2125 ml / NET: 962 ml    I & Os for current day (as of 2017 08:19)  =============================================  IN: 110.6 ml / OUT: 55 ml / NET: 55.6 ml    I&O's Detail  I & Os for 24h ending 2017 07:00  =============================================  IN:    IV PiggyBack: 1362.5 ml    amiodarone Infusion: 233.2 ml    norepinephrine Infusion: 197.2 ml    sodium chloride 0.9%.: 180 ml    milrinone  Infusion: 151.2 ml    lidocaine   Infusion: 135 ml    insulin Infusion: 134 ml    EPINEPHrine Infusion: 116 ml    vasopressin Infusion: 96 ml    norepinephrine Infusion: 84.6 ml    DOBUTamine Infusion: 84.5 ml    EPINEPHrine Infusion: 82 ml    DOBUTamine Infusion: 70.4 ml    EPINEPHrine Infusion: 54 ml    EPINEPHrine Infusion: 48.1 ml    EPINEPHrine Infusion: 32.1 ml    dexmedetomidine Infusion: 15 ml    DOBUTamine Infusion: 11.2 ml    Total IN: 3087 ml  ---------------------------------------------  OUT:    Indwelling Catheter - Urethral: 1430 ml    Chest Tube: 280 ml    Chest Tube: 230 ml    Nasoenteral Tube: 100 ml    Chest Tube: 85 ml    Total OUT: 2125 ml  ---------------------------------------------  Total NET: 962 ml    I & Os for current day (as of 2017 08:19)  =============================================  IN:    amiodarone Infusion: 33.3 ml    norepinephrine Infusion: 24 ml    EPINEPHrine Infusion: 18 ml    sodium chloride 0.9%.: 10 ml    milrinone  Infusion: 8.4 ml    lidocaine   Infusion: 7.5 ml    vasopressin Infusion: 6 ml    dexmedetomidine Infusion: 3.4 ml    Total IN: 110.6 ml  ---------------------------------------------  OUT:    Chest Tube: 20 ml    Indwelling Catheter - Urethral: 15 ml    Chest Tube: 10 ml    Chest Tube: 10 ml    Total OUT: 55 ml  ---------------------------------------------  Total NET: 55.6 ml      Physical Exam:  Appearance: Comfortable. No respiratory distress  HEENT: PERR   Neck: Supple. No masses  Cardiovascular: Typical LVAD sounds  Respiratory: Rhonchorous breath sounds  Gastrointestinal: Soft, ND/NT  Neurologic: Sedated  Psychiatry: Sedated  Skin: No breakdown      LVAD Interrogation: HM2  Pump Flow: 4.4  Pump Speed: 9200  Pulse Index: 4.3  Pump Power: 5.1  VAD Events: No alarms  Driveline evaluation:  No drainage or erythema  Programming Changes: Changes made: RPMs increased to 9200.    Labs:                        9.7    18.3  )-----------( 101      ( 2017 02:13 )             30.7         133<L>  |  99  |  24<H>  ----------------------------<  191<H>  4.8   |  19<L>  |  1.72<H>    Ca    8.6      2017 02:13  Phos  2.0       Mg     2.7         TPro  6.1  /  Alb  3.4  /  TBili  5.1<H>  /  DBili  x   /  AST  208<H>  /  ALT  70<H>  /  AlkPhos  66      PT/INR - ( 2017 02:13 )   PT: 16.9 sec;   INR: 1.54 ratio        PTT - ( 2017 02:13 )  PTT:32.6 sec  CARDIAC MARKERS ( 2017 14:41 )  x     / 0.75 ng/mL / 466 U/L / x     / 31.7 ng/mL    Oxygen Saturation, Mixed: 70 % ( @ 08:08)  Oxygen Saturation, Mixed: 82 % ( @ 02:10)  Oxygen Saturation, Mixed: 88 % ( @ 14:24)    Lactate Dehydrogenase, Serum: 1341 U/L ( @ 02:13)  Lactate Dehydrogenase, Serum: 2083 U/L ( @ 02:34)

## 2017-06-13 NOTE — PROGRESS NOTE ADULT - PROBLEM SELECTOR PLAN 3
RPMs increased to 9200 last evening to decrease LAP.   No VAD alarms or significant events  Continue aspirin 81 mg daily  Will possibly start heparin drip

## 2017-06-13 NOTE — PROGRESS NOTE ADULT - ATTENDING COMMENTS
NORMAN- cardiorenal +mild retention component (but did not rquire emily)  creatinine had improved to 1.4---now s/p LVAD- monitor UO and creatinine trend  Met acidosis- monitor hco3  CHF-LAsix as needed NORMAN- cardiorenal +mild retention component (but did not rquire emily)  creatinine had improved to 1.4---now s/p LVAD- creatinine has been rising s/p LVAD with v tach as well as BP changes, and sz like activity- likely ATN- UO decreasing, given 80IV lasix, willl reassess for CRRT this afternoon; monitor UO and creatinine trend and electrolytes  Met acidosis- monitor hco3  CHF-LAsix as needed

## 2017-06-14 LAB
ALBUMIN SERPL ELPH-MCNC: 3.2 G/DL — LOW (ref 3.3–5)
ALP SERPL-CCNC: 66 U/L — SIGNIFICANT CHANGE UP (ref 40–120)
ALT FLD-CCNC: 63 U/L RC — HIGH (ref 10–45)
ANION GAP SERPL CALC-SCNC: 16 MMOL/L — SIGNIFICANT CHANGE UP (ref 5–17)
APTT BLD: 35.5 SEC — SIGNIFICANT CHANGE UP (ref 27.5–37.4)
APTT BLD: 52.1 SEC — HIGH (ref 27.5–37.4)
APTT BLD: 54.1 SEC — HIGH (ref 27.5–37.4)
APTT BLD: 56.1 SEC — HIGH (ref 27.5–37.4)
AST SERPL-CCNC: 157 U/L — HIGH (ref 10–40)
BASE EXCESS BLDMV CALC-SCNC: -4.2 MMOL/L — LOW (ref -3–3)
BASE EXCESS BLDMV CALC-SCNC: -4.8 MMOL/L — LOW (ref -3–3)
BASE EXCESS BLDMV CALC-SCNC: -5.4 MMOL/L — LOW (ref -3–3)
BASE EXCESS BLDV CALC-SCNC: -4.1 MMOL/L — LOW (ref -2–2)
BILIRUB SERPL-MCNC: 5.1 MG/DL — HIGH (ref 0.2–1.2)
BUN SERPL-MCNC: 35 MG/DL — HIGH (ref 7–23)
CA-I FLD-SCNC: 0.43 MMOL/L — SIGNIFICANT CHANGE UP
CALCIUM IONIZED, FLUID: 0.43 MMOL/L — SIGNIFICANT CHANGE UP
CALCIUM SERPL-MCNC: 8.3 MG/DL — LOW (ref 8.4–10.5)
CHLORIDE SERPL-SCNC: 96 MMOL/L — SIGNIFICANT CHANGE UP (ref 96–108)
CO2 BLDMV-SCNC: 20 MMOL/L — LOW (ref 21–29)
CO2 BLDMV-SCNC: 21 MMOL/L — SIGNIFICANT CHANGE UP (ref 21–29)
CO2 BLDMV-SCNC: 21 MMOL/L — SIGNIFICANT CHANGE UP (ref 21–29)
CO2 BLDV-SCNC: 21 MMOL/L — LOW (ref 22–30)
CO2 SERPL-SCNC: 16 MMOL/L — LOW (ref 22–31)
CREAT SERPL-MCNC: 2.47 MG/DL — HIGH (ref 0.5–1.3)
GAS PNL BLDA: SIGNIFICANT CHANGE UP
GAS PNL BLDMV: SIGNIFICANT CHANGE UP
GAS PNL BLDV: SIGNIFICANT CHANGE UP
GLUCOSE SERPL-MCNC: 155 MG/DL — HIGH (ref 70–99)
HAPTOGLOB SERPL-MCNC: 52 MG/DL — SIGNIFICANT CHANGE UP (ref 34–200)
HCO3 BLDMV-SCNC: 19 MMOL/L — LOW (ref 20–28)
HCO3 BLDMV-SCNC: 20 MMOL/L — SIGNIFICANT CHANGE UP (ref 20–28)
HCO3 BLDMV-SCNC: 20 MMOL/L — SIGNIFICANT CHANGE UP (ref 20–28)
HCO3 BLDV-SCNC: 20 MMOL/L — LOW (ref 21–29)
HCT VFR BLD CALC: 29.3 % — LOW (ref 39–50)
HGB BLD-MCNC: 9.1 G/DL — LOW (ref 13–17)
HOROWITZ INDEX BLDMV+IHG-RTO: 50 — SIGNIFICANT CHANGE UP
HOROWITZ INDEX BLDV+IHG-RTO: 50 — SIGNIFICANT CHANGE UP
INR BLD: 1.4 RATIO — HIGH (ref 0.88–1.16)
LDH SERPL L TO P-CCNC: 1098 U/L — HIGH (ref 50–242)
MCHC RBC-ENTMCNC: 27.1 PG — SIGNIFICANT CHANGE UP (ref 27–34)
MCHC RBC-ENTMCNC: 31 GM/DL — LOW (ref 32–36)
MCV RBC AUTO: 87.3 FL — SIGNIFICANT CHANGE UP (ref 80–100)
O2 CT VFR BLD CALC: 37 MMHG — SIGNIFICANT CHANGE UP (ref 30–65)
O2 CT VFR BLD CALC: 37 MMHG — SIGNIFICANT CHANGE UP (ref 30–65)
O2 CT VFR BLD CALC: 38 MMHG — SIGNIFICANT CHANGE UP (ref 30–65)
PCO2 BLDMV: 34 MMHG — SIGNIFICANT CHANGE UP (ref 30–65)
PCO2 BLDMV: 36 MMHG — SIGNIFICANT CHANGE UP (ref 30–65)
PCO2 BLDMV: 38 MMHG — SIGNIFICANT CHANGE UP (ref 30–65)
PCO2 BLDV: 36 MMHG — SIGNIFICANT CHANGE UP (ref 35–50)
PH BLDMV: 7.34 — SIGNIFICANT CHANGE UP (ref 7.32–7.45)
PH BLDMV: 7.35 — SIGNIFICANT CHANGE UP (ref 7.32–7.45)
PH BLDMV: 7.38 — SIGNIFICANT CHANGE UP (ref 7.32–7.45)
PH BLDV: 7.37 — SIGNIFICANT CHANGE UP (ref 7.35–7.45)
PLATELET # BLD AUTO: 86 K/UL — LOW (ref 150–400)
PO2 BLDV: 50 MMHG — HIGH (ref 25–45)
POTASSIUM SERPL-MCNC: 5 MMOL/L — SIGNIFICANT CHANGE UP (ref 3.5–5.3)
POTASSIUM SERPL-SCNC: 5 MMOL/L — SIGNIFICANT CHANGE UP (ref 3.5–5.3)
PROT SERPL-MCNC: 5.9 G/DL — LOW (ref 6–8.3)
PROTHROM AB SERPL-ACNC: 15.2 SEC — HIGH (ref 9.8–12.7)
RBC # BLD: 3.36 M/UL — LOW (ref 4.2–5.8)
RBC # FLD: 19.4 % — HIGH (ref 10.3–14.5)
SAO2 % BLDMV: 65 % — SIGNIFICANT CHANGE UP (ref 60–90)
SAO2 % BLDMV: 66 % — SIGNIFICANT CHANGE UP (ref 60–90)
SAO2 % BLDMV: 68 % — SIGNIFICANT CHANGE UP (ref 60–90)
SAO2 % BLDV: 82 % — SIGNIFICANT CHANGE UP (ref 67–88)
SODIUM SERPL-SCNC: 128 MMOL/L — LOW (ref 135–145)
T3 SERPL-MCNC: 66 NG/DL — LOW (ref 80–200)
T4 AB SER-ACNC: 7.2 UG/DL — SIGNIFICANT CHANGE UP (ref 4.6–12)
TSH SERPL-MCNC: 2.33 UIU/ML — SIGNIFICANT CHANGE UP (ref 0.27–4.2)
VANCOMYCIN TROUGH SERPL-MCNC: 27 UG/ML — CRITICAL HIGH (ref 10–20)
VANCOMYCIN TROUGH SERPL-MCNC: 6.8 UG/ML — LOW (ref 10–20)
WBC # BLD: 28.1 K/UL — HIGH (ref 3.8–10.5)
WBC # FLD AUTO: 28.1 K/UL — HIGH (ref 3.8–10.5)

## 2017-06-14 PROCEDURE — 99233 SBSQ HOSP IP/OBS HIGH 50: CPT | Mod: 25,GC

## 2017-06-14 PROCEDURE — 71010: CPT | Mod: 26

## 2017-06-14 PROCEDURE — 93750 INTERROGATION VAD IN PERSON: CPT

## 2017-06-14 PROCEDURE — 99233 SBSQ HOSP IP/OBS HIGH 50: CPT | Mod: GC

## 2017-06-14 PROCEDURE — 93010 ELECTROCARDIOGRAM REPORT: CPT

## 2017-06-14 PROCEDURE — 99292 CRITICAL CARE ADDL 30 MIN: CPT

## 2017-06-14 PROCEDURE — 95957 EEG DIGITAL ANALYSIS: CPT | Mod: 26

## 2017-06-14 PROCEDURE — 95951: CPT | Mod: 26

## 2017-06-14 PROCEDURE — 99291 CRITICAL CARE FIRST HOUR: CPT

## 2017-06-14 RX ORDER — HYDROMORPHONE HYDROCHLORIDE 2 MG/ML
0.5 INJECTION INTRAMUSCULAR; INTRAVENOUS; SUBCUTANEOUS ONCE
Qty: 0 | Refills: 0 | Status: DISCONTINUED | OUTPATIENT
Start: 2017-06-14 | End: 2017-06-14

## 2017-06-14 RX ORDER — CALCIUM GLUCONATE 100 MG/ML
2 VIAL (ML) INTRAVENOUS ONCE
Qty: 0 | Refills: 0 | Status: COMPLETED | OUTPATIENT
Start: 2017-06-14 | End: 2017-06-14

## 2017-06-14 RX ORDER — VANCOMYCIN HCL 1 G
1000 VIAL (EA) INTRAVENOUS ONCE
Qty: 0 | Refills: 0 | Status: COMPLETED | OUTPATIENT
Start: 2017-06-14 | End: 2017-06-14

## 2017-06-14 RX ORDER — AMIODARONE HYDROCHLORIDE 400 MG/1
150 TABLET ORAL ONCE
Qty: 0 | Refills: 0 | Status: COMPLETED | OUTPATIENT
Start: 2017-06-14 | End: 2017-06-14

## 2017-06-14 RX ORDER — ARGATROBAN 50 MG/50ML
0.2 INJECTION, SOLUTION INTRAVENOUS
Qty: 250 | Refills: 0 | Status: DISCONTINUED | OUTPATIENT
Start: 2017-06-14 | End: 2017-06-15

## 2017-06-14 RX ORDER — SODIUM BICARBONATE 1 MEQ/ML
50 SYRINGE (ML) INTRAVENOUS ONCE
Qty: 0 | Refills: 0 | Status: COMPLETED | OUTPATIENT
Start: 2017-06-14 | End: 2017-06-14

## 2017-06-14 RX ORDER — AMIODARONE HYDROCHLORIDE 400 MG/1
0.5 TABLET ORAL
Qty: 900 | Refills: 0 | Status: DISCONTINUED | OUTPATIENT
Start: 2017-06-14 | End: 2017-06-19

## 2017-06-14 RX ORDER — MAGNESIUM SULFATE 500 MG/ML
1 VIAL (ML) INJECTION ONCE
Qty: 0 | Refills: 0 | Status: COMPLETED | OUTPATIENT
Start: 2017-06-14 | End: 2017-06-14

## 2017-06-14 RX ORDER — SODIUM CHLORIDE 9 MG/ML
1000 INJECTION, SOLUTION INTRAVENOUS
Qty: 0 | Refills: 0 | Status: DISCONTINUED | OUTPATIENT
Start: 2017-06-14 | End: 2017-06-15

## 2017-06-14 RX ORDER — AMIODARONE HYDROCHLORIDE 400 MG/1
400 TABLET ORAL ONCE
Qty: 0 | Refills: 0 | Status: COMPLETED | OUTPATIENT
Start: 2017-06-14 | End: 2017-06-14

## 2017-06-14 RX ORDER — VANCOMYCIN HCL 1 G
1000 VIAL (EA) INTRAVENOUS EVERY 12 HOURS
Qty: 0 | Refills: 0 | Status: COMPLETED | OUTPATIENT
Start: 2017-06-14 | End: 2017-06-15

## 2017-06-14 RX ORDER — FENTANYL CITRATE 50 UG/ML
50 INJECTION INTRAVENOUS ONCE
Qty: 0 | Refills: 0 | Status: DISCONTINUED | OUTPATIENT
Start: 2017-06-14 | End: 2017-06-14

## 2017-06-14 RX ADMIN — HEPARIN SODIUM 6 UNIT(S)/HR: 5000 INJECTION INTRAVENOUS; SUBCUTANEOUS at 17:00

## 2017-06-14 RX ADMIN — VASOPRESSIN 4 UNIT(S)/MIN: 20 INJECTION INTRAVENOUS at 08:02

## 2017-06-14 RX ADMIN — FAMOTIDINE 20 MILLIGRAM(S): 10 INJECTION INTRAVENOUS at 12:34

## 2017-06-14 RX ADMIN — CHLORHEXIDINE GLUCONATE 15 MILLILITER(S): 213 SOLUTION TOPICAL at 05:04

## 2017-06-14 RX ADMIN — INSULIN HUMAN 3 UNIT(S)/HR: 100 INJECTION, SOLUTION SUBCUTANEOUS at 08:02

## 2017-06-14 RX ADMIN — Medication 10 MG/HR: at 18:00

## 2017-06-14 RX ADMIN — AMIODARONE HYDROCHLORIDE 33.33 MG/MIN: 400 TABLET ORAL at 17:58

## 2017-06-14 RX ADMIN — Medication 81 MILLIGRAM(S): at 12:34

## 2017-06-14 RX ADMIN — PROPOFOL 5 MICROGRAM(S)/KG/MIN: 10 INJECTION, EMULSION INTRAVENOUS at 08:03

## 2017-06-14 RX ADMIN — Medication 50 MILLIEQUIVALENT(S): at 00:10

## 2017-06-14 RX ADMIN — HYDROMORPHONE HYDROCHLORIDE 0.5 MILLIGRAM(S): 2 INJECTION INTRAMUSCULAR; INTRAVENOUS; SUBCUTANEOUS at 14:15

## 2017-06-14 RX ADMIN — SODIUM CHLORIDE 10 MILLILITER(S): 9 INJECTION INTRAMUSCULAR; INTRAVENOUS; SUBCUTANEOUS at 08:03

## 2017-06-14 RX ADMIN — FLUCONAZOLE 100 MILLIGRAM(S): 150 TABLET ORAL at 05:04

## 2017-06-14 RX ADMIN — AMIODARONE HYDROCHLORIDE 600 MILLIGRAM(S): 400 TABLET ORAL at 04:23

## 2017-06-14 RX ADMIN — SODIUM CHLORIDE 60 MILLILITER(S): 9 INJECTION, SOLUTION INTRAVENOUS at 19:04

## 2017-06-14 RX ADMIN — AMIODARONE HYDROCHLORIDE 33.33 MG/MIN: 400 TABLET ORAL at 08:03

## 2017-06-14 RX ADMIN — AMIODARONE HYDROCHLORIDE 400 MILLIGRAM(S): 400 TABLET ORAL at 08:02

## 2017-06-14 RX ADMIN — Medication 5 MICROGRAM(S)/KG/MIN: at 18:00

## 2017-06-14 RX ADMIN — CHLORHEXIDINE GLUCONATE 15 MILLILITER(S): 213 SOLUTION TOPICAL at 17:27

## 2017-06-14 RX ADMIN — HEPARIN SODIUM 6 UNIT(S)/HR: 5000 INJECTION INTRAVENOUS; SUBCUTANEOUS at 22:11

## 2017-06-14 RX ADMIN — PROPOFOL 5 MICROGRAM(S)/KG/MIN: 10 INJECTION, EMULSION INTRAVENOUS at 18:00

## 2017-06-14 RX ADMIN — Medication 100 GRAM(S): at 01:08

## 2017-06-14 RX ADMIN — INSULIN HUMAN 3 UNIT(S)/HR: 100 INJECTION, SOLUTION SUBCUTANEOUS at 18:00

## 2017-06-14 RX ADMIN — SODIUM CHLORIDE 10 MILLILITER(S): 9 INJECTION INTRAMUSCULAR; INTRAVENOUS; SUBCUTANEOUS at 18:00

## 2017-06-14 RX ADMIN — MILRINONE LACTATE 8.44 MICROGRAM(S)/KG/MIN: 1 INJECTION, SOLUTION INTRAVENOUS at 08:02

## 2017-06-14 RX ADMIN — SODIUM CHLORIDE 60 MILLILITER(S): 9 INJECTION, SOLUTION INTRAVENOUS at 16:40

## 2017-06-14 RX ADMIN — VASOPRESSIN 4 UNIT(S)/MIN: 20 INJECTION INTRAVENOUS at 18:00

## 2017-06-14 RX ADMIN — Medication 5 MICROGRAM(S)/KG/MIN: at 08:02

## 2017-06-14 RX ADMIN — Medication 10 MG/HR: at 08:02

## 2017-06-14 RX ADMIN — Medication 200 MILLIGRAM(S): at 17:58

## 2017-06-14 RX ADMIN — Medication 5 MILLIGRAM(S): at 05:34

## 2017-06-14 RX ADMIN — LEVETIRACETAM 400 MILLIGRAM(S): 250 TABLET, FILM COATED ORAL at 05:04

## 2017-06-14 RX ADMIN — Medication 250 MILLIGRAM(S): at 08:55

## 2017-06-14 RX ADMIN — AMIODARONE HYDROCHLORIDE 600 MILLIGRAM(S): 400 TABLET ORAL at 12:45

## 2017-06-14 RX ADMIN — HYDROMORPHONE HYDROCHLORIDE 0.5 MILLIGRAM(S): 2 INJECTION INTRAMUSCULAR; INTRAVENOUS; SUBCUTANEOUS at 14:30

## 2017-06-14 RX ADMIN — FENTANYL CITRATE 50 MICROGRAM(S): 50 INJECTION INTRAVENOUS at 09:45

## 2017-06-14 RX ADMIN — Medication 400 GRAM(S): at 11:33

## 2017-06-14 RX ADMIN — SODIUM CHLORIDE 60 MILLILITER(S): 9 INJECTION, SOLUTION INTRAVENOUS at 18:00

## 2017-06-14 RX ADMIN — FENTANYL CITRATE 50 MICROGRAM(S): 50 INJECTION INTRAVENOUS at 09:30

## 2017-06-14 RX ADMIN — Medication 200 MILLIGRAM(S): at 05:36

## 2017-06-14 RX ADMIN — LEVETIRACETAM 400 MILLIGRAM(S): 250 TABLET, FILM COATED ORAL at 17:20

## 2017-06-14 RX ADMIN — MILRINONE LACTATE 4.5 MICROGRAM(S)/KG/MIN: 1 INJECTION, SOLUTION INTRAVENOUS at 18:00

## 2017-06-14 RX ADMIN — HEPARIN SODIUM 5 UNIT(S)/HR: 5000 INJECTION INTRAVENOUS; SUBCUTANEOUS at 10:18

## 2017-06-14 RX ADMIN — AMIODARONE HYDROCHLORIDE 400 MILLIGRAM(S): 400 TABLET ORAL at 12:50

## 2017-06-14 NOTE — PROGRESS NOTE ADULT - ASSESSMENT
67M POD 2 s/p HM2 LVAD, remains on pressors/inotropes, intermittently responds to commands, 24 hr EEG monitoring to r/o seizure activity. Remains intubated.    - hemodynamic monitoring  - continuous EEG monitoring  - GI/DVT ppx  - FSG/ pain control  - d/w ICU team in AM

## 2017-06-14 NOTE — PROGRESS NOTE ADULT - ATTENDING COMMENTS
Slowly improving with decreased epinephrine and vasopressor requirements. He is maintaining good cardiac output on reduced dose milrinone and off of dobutamine. He remains intubated. Heparin started and we are adjusting for aPTT of 50-70. Will remove volume with CVVH while awaiting resolution of acute renal injury.

## 2017-06-14 NOTE — PROGRESS NOTE ADULT - ASSESSMENT
67 year old man with ACC/AHA stage D congestive heart failure with severely reduced LV systolic function due to a nonischemic dilated cardiomyopathy s/p HM2 LVAD on 6/12 with post-operative course complicated by hemodynamically but self-terminating ventricular tachycardia. His licodaine and amiodarone were both discontinued, but he had recurrent VT and is now on amiodarone again. His inotropes and vasopressors are gradually being weaned, but he remains intubated.

## 2017-06-14 NOTE — PROGRESS NOTE ADULT - PROBLEM SELECTOR PLAN 3
RPMs increased to 9200 last evening to decrease LAP.   No VAD alarms or significant events  Continue aspirin 81 mg daily  Will discuss when to initiate heparin drip

## 2017-06-14 NOTE — PROGRESS NOTE ADULT - ATTENDING COMMENTS
NORMAN- cardiorenal +mild retention component (but did not rquire emily)  creatinine had improved to 1.4---now s/p LVAD- creatinine has been rising s/p LVAD with v tach as well as BP changes, and sz like activity- likely ATN- UO decreasing, given 80IV lasix, willl reassess for CRRT this afternoon; monitor UO and creatinine trend and electrolytes  Met acidosis- monitor hco3  CHF-LAsix as needed NORMAN- initially preop cardiorenal +mild retention component (but did not rquire emily)  creatinine had improved to 1.4---s/p LVAD- creatinine has been rising s/p  v tach as well as BP fluctuations, and sz like activity- creatinine continues to climb-likely ATN- UO is okay on a LSaix drip however it is not enough to keep up with his hourly input.  Will start CVVHDF for mild hyperkalemia, rising creatinine, but also for volume status to keep patient net even hourly; will attempt 0-50cc/hr UF as BP tolerates   mild hyperkalemia-adjust dialysate with CVVHDF  Met acidosis- monitor hco3  CHF-LAsix drip, CVVHDF  case d/w dr Tam in detail

## 2017-06-14 NOTE — PROGRESS NOTE ADULT - SUBJECTIVE AND OBJECTIVE BOX
[facility] DIVISION OF KIDNEY DISEASES AND HYPERTENSION -- FOLLOW UP NOTE  --------------------------------------------------------------------------------  Chief Complaint: Heart failure      24hour events/Subjective: Pt seen and examined. Remains intubated and hypotensive requiring pressor support.         PAST HISTORY  --------------------------------------------------------------------------------  No significant changes to PMH, PSH, FHx, SHx, unless otherwise noted    ALLERGIES & MEDICATIONS  --------------------------------------------------------------------------------  Allergies    No Known Allergies    Intolerances      Standing Inpatient Medications  sodium chloride 0.9%. 1000milliLiter(s) IV Continuous <Continuous>  famotidine Injectable 20milliGRAM(s) IV Push daily  docusate sodium 100milliGRAM(s) Oral three times a day  insulin Infusion 3Unit(s)/Hr IV Continuous <Continuous>  dextrose 5%. 1000milliLiter(s) IV Continuous <Continuous>  dextrose 50% Injectable 12.5Gram(s) IV Push once  dextrose 50% Injectable 25Gram(s) IV Push once  dextrose 50% Injectable 25Gram(s) IV Push once  milrinone Infusion 0.375MICROgram(s)/kG/Min IV Continuous <Continuous>  vasopressin Infusion 0.067Unit(s)/Min IV Continuous <Continuous>  amiodarone Infusion 1mG/Min IV Continuous <Continuous>  levETIRAcetam  IVPB 500milliGRAM(s) IV Intermittent every 12 hours  aspirin  chewable 81milliGRAM(s) Oral daily  furosemide Infusion 20mG/Hr IV Continuous <Continuous>  chlorhexidine 0.12% Liquid 15milliLiter(s) Swish and Spit two times a day  heparin  Infusion 500Unit(s)/Hr IV Continuous <Continuous>  freetext medication -  Infusion 8milliGRAM(s) IV Continuous <Continuous>  propofol Infusion 11.111MICROgram(s)/kG/Min IV Continuous <Continuous>  norepinephrine Infusion 0.071MICROgram(s)/kG/Min IV Continuous <Continuous>  ciprofloxacin   IVPB 400milliGRAM(s) IV Intermittent every 12 hours  ciprofloxacin   IVPB  IV Intermittent   vancomycin  IVPB 1000milliGRAM(s) IV Intermittent once    PRN Inpatient Medications  dextrose Gel 1Dose(s) Oral once PRN  glucagon  Injectable 1milliGRAM(s) IntraMuscular once PRN      REVIEW OF SYSTEMS  --------------------------------------------------------------------------------  As above         VITALS/PHYSICAL EXAM  --------------------------------------------------------------------------------  T(C): 36.5, Max: 37.5 (06-13 @ 10:00)  HR: 97 (89 - 113)  BP: 90/79  RR: 18 (5 - 33)  SpO2: 100% (78% - 100%)  Wt(kg): --      I & Os for 24h ending 06-14-17 @ 07:00  =============================================  IN: 4424.3 ml / OUT: 2475 ml / NET: 1949.3 ml    I & Os for current day (as of 06-14-17 @ 08:33)  =============================================  IN: 87.7 ml / OUT: 120 ml / NET: -32.3 ml    Physical Exam:  	Gen: NAD,   	HEENT:+ETT  	Pulm: Mechanical breath sounds b/l  	CV: S1S2  	Abd: Soft, +BS  	Ext: No LE edema B/L                      Neuro:Sedated  	Skin: Warm and Dry , No rash  	Other : +emily    LABS/STUDIES  --------------------------------------------------------------------------------              9.1    28.1  >-----------<  86       [06-14-17 @ 01:35]              29.3     128  |  96  |  35  ----------------------------<  155      [06-14-17 @ 01:35]  5.0   |  16  |  2.47        Ca     8.3     [06-14-17 @ 01:35]      Mg     2.7     [06-12-17 @ 21:39]      Phos  2.0     [06-12-17 @ 21:39]    TPro  5.9  /  Alb  3.2  /  TBili  5.1  /  DBili  x   /  AST  157  /  ALT  63  /  AlkPhos  66  [06-14-17 @ 01:35]    PT/INR: PT 15.2 , INR 1.40       [06-14-17 @ 01:35]  PTT: 52.1       [06-14-17 @ 01:35]    Troponin 0.75      [06-12-17 @ 14:41]        [06-14-17 @ 01:35]  LDH 1098      [06-14-17 @ 01:35]    Creatinine Trend:  SCr 2.47 [06-14 @ 01:35]  SCr 2.20 [06-13 @ 16:22]  SCr 1.72 [06-13 @ 02:13]  SCr 1.66 [06-12 @ 21:39]  SCr 1.42 [06-12 @ 14:41]    Urinalysis - [06-03-17 @ 10:46]      Color Mary / Appearance Clear / SG 1.012 / pH 5.0      Gluc Negative / Ketone Negative  / Bili Negative / Urobili 1.0       Blood Negative / Protein Negative / Leuk Est Negative / Nitrite Negative      RBC 2 / WBC 1 / Hyaline 6 / Gran  / Sq Epi  / Non Sq Epi 0 / Bacteria Negative      Iron 20, TIBC 352, %sat 6      [06-08-17 @ 07:14]  Ferritin 121.0      [06-09-17 @ 19:08]  HbA1c 5.5      [06-07-17 @ 06:14]  TSH 3.54      [06-07-17 @ 06:14]  Lipid: chol 62, TG 33, HDL 17, LDL 38      [06-07-17 @ 06:14]    HBsAb Nonreact      [05-15-17 @ 15:40]  HBsAg Nonreact      [05-15-17 @ 15:40]  HBcAb Nonreact      [05-15-17 @ 15:40]  HCV 0.17, Nonreact      [05-15-17 @ 15:40]  HIV Nonreact      [05-15-17 @ 13:47] [facility] DIVISION OF KIDNEY DISEASES AND HYPERTENSION -- FOLLOW UP NOTE  --------------------------------------------------------------------------------  Chief Complaint: Heart failure      24hour events/Subjective: Pt seen and examined. Remains intubated and hypotensive requiring pressor support.         PAST HISTORY  --------------------------------------------------------------------------------  No significant changes to PMH, PSH, FHx, SHx, unless otherwise noted    ALLERGIES & MEDICATIONS  --------------------------------------------------------------------------------  Allergies    No Known Allergies    Intolerances      Standing Inpatient Medications  sodium chloride 0.9%. 1000milliLiter(s) IV Continuous <Continuous>  famotidine Injectable 20milliGRAM(s) IV Push daily  docusate sodium 100milliGRAM(s) Oral three times a day  insulin Infusion 3Unit(s)/Hr IV Continuous <Continuous>  dextrose 5%. 1000milliLiter(s) IV Continuous <Continuous>  dextrose 50% Injectable 12.5Gram(s) IV Push once  dextrose 50% Injectable 25Gram(s) IV Push once  dextrose 50% Injectable 25Gram(s) IV Push once  milrinone Infusion 0.375MICROgram(s)/kG/Min IV Continuous <Continuous>  vasopressin Infusion 0.067Unit(s)/Min IV Continuous <Continuous>  amiodarone Infusion 1mG/Min IV Continuous <Continuous>  levETIRAcetam  IVPB 500milliGRAM(s) IV Intermittent every 12 hours  aspirin  chewable 81milliGRAM(s) Oral daily  furosemide Infusion 20mG/Hr IV Continuous <Continuous>  chlorhexidine 0.12% Liquid 15milliLiter(s) Swish and Spit two times a day  heparin  Infusion 500Unit(s)/Hr IV Continuous <Continuous>  freetext medication -  Infusion 8milliGRAM(s) IV Continuous <Continuous>  propofol Infusion 11.111MICROgram(s)/kG/Min IV Continuous <Continuous>  norepinephrine Infusion 0.071MICROgram(s)/kG/Min IV Continuous <Continuous>  ciprofloxacin   IVPB 400milliGRAM(s) IV Intermittent every 12 hours  ciprofloxacin   IVPB  IV Intermittent   vancomycin  IVPB 1000milliGRAM(s) IV Intermittent once    PRN Inpatient Medications  dextrose Gel 1Dose(s) Oral once PRN  glucagon  Injectable 1milliGRAM(s) IntraMuscular once PRN      REVIEW OF SYSTEMS  --------------------------------------------------------------------------------  As above         VITALS/PHYSICAL EXAM  --------------------------------------------------------------------------------  T(C): 36.5, Max: 37.5 (06-13 @ 10:00)  HR: 97 (89 - 113)  BP: 90/79  RR: 18 (5 - 33)  SpO2: 100% (78% - 100%)  Wt(kg): --      I & Os for 24h ending 06-14-17 @ 07:00  =============================================  IN: 4424.3 ml / OUT: 2475 ml / NET: 1949.3 ml    I & Os for current day (as of 06-14-17 @ 08:33)  =============================================  IN: 87.7 ml / OUT: 120 ml / NET: -32.3 ml    Physical Exam:  	Gen: NAD,   	HEENT:+ETT  	Pulm: Mechanical breath sounds b/l  	CV: S1S2  	Abd: Soft, +BS  	Ext: No LE edema B/L                      Neuro:Sedated  	Skin: Warm and Dry , No rash  	Other : +diaz +LVAD    LABS/STUDIES  --------------------------------------------------------------------------------              9.1    28.1  >-----------<  86       [06-14-17 @ 01:35]              29.3     128  |  96  |  35  ----------------------------<  155      [06-14-17 @ 01:35]  5.0   |  16  |  2.47        Ca     8.3     [06-14-17 @ 01:35]      Mg     2.7     [06-12-17 @ 21:39]      Phos  2.0     [06-12-17 @ 21:39]    TPro  5.9  /  Alb  3.2  /  TBili  5.1  /  DBili  x   /  AST  157  /  ALT  63  /  AlkPhos  66  [06-14-17 @ 01:35]    PT/INR: PT 15.2 , INR 1.40       [06-14-17 @ 01:35]  PTT: 52.1       [06-14-17 @ 01:35]    Troponin 0.75      [06-12-17 @ 14:41]        [06-14-17 @ 01:35]  LDH 1098      [06-14-17 @ 01:35]    Creatinine Trend:  SCr 2.47 [06-14 @ 01:35]  SCr 2.20 [06-13 @ 16:22]  SCr 1.72 [06-13 @ 02:13]  SCr 1.66 [06-12 @ 21:39]  SCr 1.42 [06-12 @ 14:41]    Urinalysis - [06-03-17 @ 10:46]      Color Mary / Appearance Clear / SG 1.012 / pH 5.0      Gluc Negative / Ketone Negative  / Bili Negative / Urobili 1.0       Blood Negative / Protein Negative / Leuk Est Negative / Nitrite Negative      RBC 2 / WBC 1 / Hyaline 6 / Gran  / Sq Epi  / Non Sq Epi 0 / Bacteria Negative      Iron 20, TIBC 352, %sat 6      [06-08-17 @ 07:14]  Ferritin 121.0      [06-09-17 @ 19:08]  HbA1c 5.5      [06-07-17 @ 06:14]  TSH 3.54      [06-07-17 @ 06:14]  Lipid: chol 62, TG 33, HDL 17, LDL 38      [06-07-17 @ 06:14]    HBsAb Nonreact      [05-15-17 @ 15:40]  HBsAg Nonreact      [05-15-17 @ 15:40]  HBcAb Nonreact      [05-15-17 @ 15:40]  HCV 0.17, Nonreact      [05-15-17 @ 15:40]  HIV Nonreact      [05-15-17 @ 13:47]

## 2017-06-14 NOTE — PROGRESS NOTE ADULT - PROBLEM SELECTOR PLAN 2
Will wean inotropes as tolerated to keep CI > 2.4  Please measure hourly thermodilution CO/CI Will wean inotropes as tolerated to keep CI > 2.4

## 2017-06-14 NOTE — PROGRESS NOTE ADULT - SUBJECTIVE AND OBJECTIVE BOX
Operation: Heartmate 2 LVAD implant  POD: 2  Narrative: Pt. seen and examined. Intubated on vent    Vital Signs Last 24 Hrs  T(C): 36.6, Max: 37.5 (06-13 @ 10:00)  T(F): 97.9, Max: 99.5 (06-13 @ 10:00)  HR: 91 (89 - 115)  BP: --  BP(mean): --  RR: 16 (5 - 40)  SpO2: 99% (78% - 100%)  I&O's Detail    I & Os for current day (as of 14 Jun 2017 07:16)  =============================================  IN:    IV PiggyBack: 2050 ml    amiodarone Infusion: 432.9 ml    sodium chloride 0.9%.: 240 ml    furosemide Infusion: 200 ml    amiodarone Infusion: 183.4 ml    milrinone  Infusion: 177.6 ml    norepinephrine Infusion: 176 ml    EPINEPHrine Infusion: 144 ml    vasopressin Infusion: 144 ml    Enteral Tube Flush: 120 ml    propofol Infusion: 110 ml    amiodarone Infusion: 99.9 ml    freetext medication -  Infusion: 95 ml    heparin Infusion: 85 ml    norepinephrine Infusion: 72 ml    insulin Infusion: 64 ml    lidocaine   Infusion: 7.5 ml    dexmedetomidine Infusion: 3.4 ml    Total IN: 4404.7 ml  ---------------------------------------------  OUT:    Indwelling Catheter - Urethral: 1770 ml    Chest Tube: 275 ml    Chest Tube: 230 ml    Chest Tube: 100 ml    Nasoenteral Tube: 100 ml    Total OUT: 2475 ml  ---------------------------------------------  Total NET: 1929.7 ml        LABS:                        9.1    28.1  )-----------( 86       ( 14 Jun 2017 01:35 )             29.3     06-14    128<L>  |  96  |  35<H>  ----------------------------<  155<H>  5.0   |  16<L>  |  2.47<H>    Ca    8.3<L>      14 Jun 2017 01:35  Phos  2.0     06-12  Mg     2.7     06-12    TPro  5.9<L>  /  Alb  3.2<L>  /  TBili  5.1<H>  /  DBili  x   /  AST  157<H>  /  ALT  63<H>  /  AlkPhos  66  06-14    PT/INR - ( 14 Jun 2017 01:35 )   PT: 15.2 sec;   INR: 1.40 ratio         PTT - ( 14 Jun 2017 01:35 )  PTT:52.1 sec  ABG - ( 14 Jun 2017 05:02 )  pH: 7.42  /  pCO2: 30    /  pO2: 118   / HCO3: 19    / Base Excess: -4.7  /  SaO2: 99                CARDIAC MARKERS ( 14 Jun 2017 01:35 )  x     / x     / 321 U/L / x     / x      CARDIAC MARKERS ( 12 Jun 2017 14:41 )  x     / 0.75 ng/mL / 466 U/L / x     / 31.7 ng/mL      MEDICATIONS  (STANDING):  sodium chloride 0.9%. 1000milliLiter(s) IV Continuous <Continuous>  famotidine Injectable 20milliGRAM(s) IV Push daily  docusate sodium 100milliGRAM(s) Oral three times a day  insulin Infusion 3Unit(s)/Hr IV Continuous <Continuous>  dextrose 5%. 1000milliLiter(s) IV Continuous <Continuous>  dextrose 50% Injectable 12.5Gram(s) IV Push once  dextrose 50% Injectable 25Gram(s) IV Push once  dextrose 50% Injectable 25Gram(s) IV Push once  milrinone Infusion 0.375MICROgram(s)/kG/Min IV Continuous <Continuous>  vasopressin Infusion 0.067Unit(s)/Min IV Continuous <Continuous>  amiodarone Infusion 1mG/Min IV Continuous <Continuous>  levETIRAcetam  IVPB 500milliGRAM(s) IV Intermittent every 12 hours  aspirin  chewable 81milliGRAM(s) Oral daily  furosemide Infusion 20mG/Hr IV Continuous <Continuous>  chlorhexidine 0.12% Liquid 15milliLiter(s) Swish and Spit two times a day  heparin  Infusion 500Unit(s)/Hr IV Continuous <Continuous>  freetext medication -  Infusion 8milliGRAM(s) IV Continuous <Continuous>  propofol Infusion 11.111MICROgram(s)/kG/Min IV Continuous <Continuous>  norepinephrine Infusion 0.071MICROgram(s)/kG/Min IV Continuous <Continuous>  ciprofloxacin   IVPB 400milliGRAM(s) IV Intermittent every 12 hours  ciprofloxacin   IVPB  IV Intermittent   vancomycin  IVPB 1000milliGRAM(s) IV Intermittent once    MEDICATIONS  (PRN):  dextrose Gel 1Dose(s) Oral once PRN Blood Glucose LESS THAN 70 milliGRAM(s)/deciLiter  glucagon  Injectable 1milliGRAM(s) IntraMuscular once PRN Glucose <70 milliGRAM(s)/deciLiter      General: Intubated, sedated on vent, NAD  Chest: Sternotomy C/D/I, chest tubes with serosanguinous drainage  Heart: sinus tach, s1 s2  Lungs: CTA B/L, without W/R/R, nonlabored  Abdomen: Soft, ND, NT, positive BS, driveline site c/d/i  Extremities: without edema B/L LE, positive DP pulses B/L, warm, well perfused    Does the patient have a history of CHF? yes  -If yes, systolic or diastolic - combined  -pre-operative EF 15    Does the patient currently have Heart Failure?  Yes    If Yes, specify type:   Acute on  Chronic     Extubation within 24 hours? No    CXR: clear    Does the patient have a history of kidney disease?   yes   -CKD stage if applicable 3b  -Baseline Creatinine 1.7    Beta Blockers contraindicated for the first 24 hours due to vasopressor/inotropic medication? yes  -If on pressors, indication: hypotension    Did the patient receive transfusion of blood and/or products? yes  -If yes, indication: thrombocytopenia    DVT PPX: Heparin gtt    Maxine-operative antibiotics discontinued within 48 hours of CTU admission? -name/date/time of discontinue  Cipro/ Vanco course to be discussed with ICU team    SIRS  Yes             Non-infectious    Acute MI during admission or prior to transfer (within 8 weeks)?   No     Patient care discussed on morning interdisciplinary rounds with CTS team.     Contact: x [phone]

## 2017-06-14 NOTE — PROGRESS NOTE ADULT - ASSESSMENT
66yo  man PMH NICM with BiVHFrEF s/p AICD, pAfib on AC with hx of VT, HTN, HLD presents for management of CHF/LVAD evaluation; Neuro consulted for episode of b/l UE shaking with concern for seizures. Overnight EEG notable for moderate to severe diffuse/multifocal cerebral dysfunction, which can be seen 2/2 sedation vs multifocal cerebral lesions. Neuro exam currently stable. Recommend CT head once medically stable to r/o acute intracranial process. Continue with supportive care as per primary team.

## 2017-06-14 NOTE — PROGRESS NOTE ADULT - SUBJECTIVE AND OBJECTIVE BOX
Subjective:    Pt seen and examined at bedside. Still intermittent twitching of arms overnight per the nurse but able to follow simple commands when off sedation.     MEDICATIONS  (STANDING):  sodium chloride 0.9%. 1000milliLiter(s) IV Continuous <Continuous>  famotidine Injectable 20milliGRAM(s) IV Push daily  docusate sodium 100milliGRAM(s) Oral three times a day  insulin Infusion 3Unit(s)/Hr IV Continuous <Continuous>  dextrose 5%. 1000milliLiter(s) IV Continuous <Continuous>  dextrose 50% Injectable 12.5Gram(s) IV Push once  dextrose 50% Injectable 25Gram(s) IV Push once  dextrose 50% Injectable 25Gram(s) IV Push once  milrinone Infusion 0.2MICROgram(s)/kG/Min IV Continuous <Continuous>  vasopressin Infusion 0.067Unit(s)/Min IV Continuous <Continuous>  levETIRAcetam  IVPB 500milliGRAM(s) IV Intermittent every 12 hours  aspirin  chewable 81milliGRAM(s) Oral daily  furosemide Infusion 20mG/Hr IV Continuous <Continuous>  chlorhexidine 0.12% Liquid 15milliLiter(s) Swish and Spit two times a day  heparin  Infusion 500Unit(s)/Hr IV Continuous <Continuous>  freetext medication -  Infusion 8milliGRAM(s) IV Continuous <Continuous>  propofol Infusion 11.111MICROgram(s)/kG/Min IV Continuous <Continuous>  norepinephrine Infusion 0.071MICROgram(s)/kG/Min IV Continuous <Continuous>  ciprofloxacin   IVPB 400milliGRAM(s) IV Intermittent every 12 hours  ciprofloxacin   IVPB  IV Intermittent   vancomycin  IVPB 1000milliGRAM(s) IV Intermittent every 12 hours    MEDICATIONS  (PRN):  dextrose Gel 1Dose(s) Oral once PRN Blood Glucose LESS THAN 70 milliGRAM(s)/deciLiter  glucagon  Injectable 1milliGRAM(s) IntraMuscular once PRN Glucose <70 milliGRAM(s)/deciLiter    Allergies  No Known Allergies    Objective:   Vital Signs Last 24 Hrs  T(C): 36.3, Max: 37.5 (06-13 @ 19:00)  T(F): 97.3, Max: 99.5 (06-13 @ 19:00)  HR: 94 (87 - 113)  BP: --  BP(mean): --  RR: 17 (5 - 33)  SpO2: 98% (94% - 100%)    Exam:   intubated, not responsive to verbal/tactile/noxious stimuli, does not follow simple commands  negative oculocephalics, pupils 2mm b/l sluggishly reactive  decreased tone x 4, no clonus in b/l ankles  no withdrawal to pain x 4    Other:  CBC Full  -  ( 14 Jun 2017 01:35 )  WBC Count : 28.1 K/uL  Hemoglobin : 9.1 g/dL  Hematocrit : 29.3 %  Platelet Count - Automated : 86 K/uL  Mean Cell Volume : 87.3 fl  Mean Cell Hemoglobin : 27.1 pg  Mean Cell Hemoglobin Concentration : 31.0 gm/dL  Auto Neutrophil # : x  Auto Lymphocyte # : x  Auto Monocyte # : x  Auto Eosinophil # : x  Auto Basophil # : x  Auto Neutrophil % : x  Auto Lymphocyte % : x  Auto Monocyte % : x  Auto Eosinophil % : x  Auto Basophil % : x    06-14    128<L>  |  96  |  35<H>  ----------------------------<  155<H>  5.0   |  16<L>  |  2.47<H>    Ca    8.3<L>      14 Jun 2017 01:35  Phos  2.0     06-12  Mg     2.7     06-12    TPro  5.9<L>  /  Alb  3.2<L>  /  TBili  5.1<H>  /  DBili  x   /  AST  157<H>  /  ALT  63<H>  /  AlkPhos  66  06-14    06-14    128<L>  |  96  |  35<H>  ----------------------------<  155<H>  5.0   |  16<L>  |  2.47<H>    Ca    8.3<L>      14 Jun 2017 01:35  Phos  2.0     06-12  Mg     2.7     06-12    TPro  5.9<L>  /  Alb  3.2<L>  /  TBili  5.1<H>  /  DBili  x   /  AST  157<H>  /  ALT  63<H>  /  AlkPhos  66  06-14    LIVER FUNCTIONS - ( 14 Jun 2017 01:35 )  Alb: 3.2 g/dL / Pro: 5.9 g/dL / ALK PHOS: 66 U/L / ALT: 63 U/L RC / AST: 157 U/L / GGT: x             EEG: moderate to severe diffuse or multifocal cerebral dysfunction

## 2017-06-14 NOTE — PROGRESS NOTE ADULT - SUBJECTIVE AND OBJECTIVE BOX
Interval History:    S/P LVAD with elevated CVP. Still intubated and sedated. Brief run of VT after amiodarone was stopped.    Review Of Systems:  See above, otherwise, complete 10 point review of systems negative.    [ X]Unable to obtain    Medications:  sodium chloride 0.9%. 1000milliLiter(s) IV Continuous <Continuous>  famotidine Injectable 20milliGRAM(s) IV Push daily  docusate sodium 100milliGRAM(s) Oral three times a day  insulin Infusion 3Unit(s)/Hr IV Continuous <Continuous>  dextrose 5%. 1000milliLiter(s) IV Continuous <Continuous>  dextrose Gel 1Dose(s) Oral once PRN  dextrose 50% Injectable 12.5Gram(s) IV Push once  dextrose 50% Injectable 25Gram(s) IV Push once  dextrose 50% Injectable 25Gram(s) IV Push once  glucagon  Injectable 1milliGRAM(s) IntraMuscular once PRN  milrinone Infusion 0.2MICROgram(s)/kG/Min IV Continuous <Continuous>  vasopressin Infusion 0.067Unit(s)/Min IV Continuous <Continuous>  levETIRAcetam  IVPB 500milliGRAM(s) IV Intermittent every 12 hours  aspirin  chewable 81milliGRAM(s) Oral daily  furosemide Infusion 20mG/Hr IV Continuous <Continuous>  chlorhexidine 0.12% Liquid 15milliLiter(s) Swish and Spit two times a day  heparin  Infusion 500Unit(s)/Hr IV Continuous <Continuous>  freetext medication -  Infusion 8milliGRAM(s) IV Continuous <Continuous>  propofol Infusion 11.111MICROgram(s)/kG/Min IV Continuous <Continuous>  norepinephrine Infusion 0.071MICROgram(s)/kG/Min IV Continuous <Continuous>  ciprofloxacin   IVPB 400milliGRAM(s) IV Intermittent every 12 hours  ciprofloxacin   IVPB  IV Intermittent   vancomycin  IVPB 1000milliGRAM(s) IV Intermittent every 12 hours  amiodarone IVPB 150milliGRAM(s) IV Intermittent once  amiodarone Infusion 1mG/Min IV Continuous <Continuous>    Vitals:  T(C): 35.4, Max: 37.5 (06-13 @ 19:00)  HR: 90 (87 - 113)  BP: --  BP(mean): --  ABP: 72/61 (60/49 - 104/91)  ABP(mean): 65 (54 - 97)  RR: 13 (5 - 33)  SpO2: 100% (94% - 100%)  Wt(kg): --  CVP(cm H2O): 19  CO: 4.6 (4 - 6.3)  CI: 2.4 (2.1 - 3.5)  PA: 42/17 (38/14 - 101/19)  PA(mean): 26 (22 - 121)  PCWP: --  SVR: 816 (745 - 1338)  PVR: --    Daily     Daily Weight in k.8 (2017 00:00)        I&O's Summary  I & Os for 24h ending 2017 07:00  =============================================  IN: 4424.3 ml / OUT: 2475 ml / NET: 1949.3 ml    I & Os for current day (as of 2017 13:06)  =============================================  IN: 960 ml / OUT: 606 ml / NET: 354 ml      Physical Exam:  Appearance: Intubated  HEENT: lines in place  Cardiovascular: + LVAD hum, surgical dressing C/D/I  Respiratory: Clear to auscultation bilaterally anteriorly  Gastrointestinal:  Soft, Non-tender	  Skin: No cyanosis	  Neurologic: sedated  Extremities: No edema  Psychiatry: sedated    LVAD Interrogation:  Pump Flow: 3.9  Pump Speed: 9200RPM  Pulse Index: 5.1  Pump Power: 4.9  VAD Events: None  Driveline evaluation:    Programming Changes: [ ] No changes made [ ] Changes made:    Labs:                        9.1    28.1  )-----------( 86       ( 2017 01:35 )             29.3     06-14    128<L>  |  96  |  35<H>  ----------------------------<  155<H>  5.0   |  16<L>  |  2.47<H>    Ca    8.3<L>      2017 01:35  Phos  2.0       Mg     2.7         TPro  5.9<L>  /  Alb  3.2<L>  /  TBili  5.1<H>  /  DBili  x   /  AST  157<H>  /  ALT  63<H>  /  AlkPhos  66      PT/INR - ( 2017 01:35 )   PT: 15.2 sec;   INR: 1.40 ratio         PTT - ( 2017 09:16 )  PTT:35.5 sec  CARDIAC MARKERS ( 2017 01:35 )  x     / x     / 321 U/L / x     / x      CARDIAC MARKERS ( 2017 14:41 )  x     / 0.75 ng/mL / 466 U/L / x     / 31.7 ng/mL        Oxygen Saturation, Mixed: 68 % ( @ 07:49)  Oxygen Saturation, Mixed: 68 % ( @ 20:07)  Oxygen Saturation, Mixed: 72 % ( @ 18:00)  Oxygen Saturation, Mixed: 75 % ( @ 16:19)  Oxygen Saturation, Mixed: 73 % ( @ 14:00)  Oxygen Saturation, Mixed: 70 % ( @ 12:09)  Oxygen Saturation, Mixed: 72 % ( @ 09:19)  Oxygen Saturation, Mixed: 70 % ( @ 08:08)  Oxygen Saturation, Mixed: 82 % ( @ 02:10)  Oxygen Saturation, Mixed: 88 % ( @ 14:24)  Oxygen Saturation, Mixed: 58 % ( @ 02:32)  Oxygen Saturation, Mixed: 54 % ( @ 21:16)  Oxygen Saturation, Mixed: 70 % ( @ 17:21)  Oxygen Saturation, Mixed: 54 % ( @ 13:42)    Lactate Dehydrogenase, Serum: 1098 U/L ( @ 01:35)  Lactate Dehydrogenase, Serum: 1341 U/L ( @ 02:13)  Lactate Dehydrogenase, Serum: 2083 U/L ( @ 02:34)  Lactate Dehydrogenase, Serum: 2170 U/L ( @ 21:18)  Lactate Dehydrogenase, Serum: 1959 U/L ( @ 17:19)  Lactate Dehydrogenase, Serum: 1808 U/L ( @ 14:11)          TELEMETRY: as above    [ ] Echocardiogram:    -----------------------------------------------------------------     [ ] ACC/AHA Stage: _____     [ ] NYHA Class: _____ Interval History:    S/P LVAD with elevated CVP. Still intubated and sedated. Brief run of VT after amiodarone was stopped.      Medications:  sodium chloride 0.9%. 1000milliLiter(s) IV Continuous <Continuous>  famotidine Injectable 20milliGRAM(s) IV Push daily  docusate sodium 100milliGRAM(s) Oral three times a day  insulin Infusion 3Unit(s)/Hr IV Continuous <Continuous>  dextrose 5%. 1000milliLiter(s) IV Continuous <Continuous>  dextrose Gel 1Dose(s) Oral once PRN  dextrose 50% Injectable 12.5Gram(s) IV Push once  dextrose 50% Injectable 25Gram(s) IV Push once  dextrose 50% Injectable 25Gram(s) IV Push once  glucagon  Injectable 1milliGRAM(s) IntraMuscular once PRN  milrinone Infusion 0.2MICROgram(s)/kG/Min IV Continuous <Continuous>  vasopressin Infusion 0.067Unit(s)/Min IV Continuous <Continuous>  levETIRAcetam  IVPB 500milliGRAM(s) IV Intermittent every 12 hours  aspirin  chewable 81milliGRAM(s) Oral daily  furosemide Infusion 20mG/Hr IV Continuous <Continuous>  chlorhexidine 0.12% Liquid 15milliLiter(s) Swish and Spit two times a day  heparin  Infusion 500Unit(s)/Hr IV Continuous <Continuous>  freetext medication -  Infusion 8milliGRAM(s) IV Continuous <Continuous>  propofol Infusion 11.111MICROgram(s)/kG/Min IV Continuous <Continuous>  norepinephrine Infusion 0.071MICROgram(s)/kG/Min IV Continuous <Continuous>  ciprofloxacin   IVPB 400milliGRAM(s) IV Intermittent every 12 hours  ciprofloxacin   IVPB  IV Intermittent   vancomycin  IVPB 1000milliGRAM(s) IV Intermittent every 12 hours  amiodarone IVPB 150milliGRAM(s) IV Intermittent once  amiodarone Infusion 1mG/Min IV Continuous <Continuous>    Vitals:  T(C): 35.4, Max: 37.5 (06-13 @ 19:00)  HR: 90 (87 - 113)  BP: --  BP(mean): --  ABP: 72/61 (60/49 - 104/91)  ABP(mean): 65 (54 - 97)  RR: 13 (5 - 33)  SpO2: 100% (94% - 100%)  Wt(kg): --  CVP(cm H2O): 19  CO: 4.6 (4 - 6.3)  CI: 2.4 (2.1 - 3.5)  PA: 42/17 (38/14 - 101/19)  PA(mean): 26 (22 - 121)  PCWP: --  SVR: 816 (745 - 1338)  PVR: --    Daily     Daily Weight in k.8 (2017 00:00)        I&O's Summary  I & Os for 24h ending 2017 07:00  =============================================  IN: 4424.3 ml / OUT: 2475 ml / NET: 1949.3 ml    I & Os for current day (as of 2017 13:06)  =============================================  IN: 960 ml / OUT: 606 ml / NET: 354 ml      Physical Exam:  Appearance: Intubated  HEENT: lines in place  Cardiovascular: + LVAD hum, surgical dressing C/D/I  Respiratory: Clear to auscultation bilaterally anteriorly  Gastrointestinal:  Soft, Non-tender	  Skin: No cyanosis	  Neurologic: sedated  Extremities: No edema  Psychiatry: sedated    LVAD Interrogation:  Pump Flow: 3.9  Pump Speed: 9200RPM  Pulse Index: 5.1  Pump Power: 4.9  VAD Events: None  Driveline evaluation:  Clean, dry, and intact.  Programming Changes: No changes made:    Labs:                        9.1    28.1  )-----------( 86       ( 2017 01:35 )             29.3     06-14    128<L>  |  96  |  35<H>  ----------------------------<  155<H>  5.0   |  16<L>  |  2.47<H>    Ca    8.3<L>      2017 01:35  Phos  2.0       Mg     2.7         TPro  5.9<L>  /  Alb  3.2<L>  /  TBili  5.1<H>  /  DBili  x   /  AST  157<H>  /  ALT  63<H>  /  AlkPhos  66      PT/INR - ( 2017 01:35 )   PT: 15.2 sec;   INR: 1.40 ratio         PTT - ( 2017 09:16 )  PTT:35.5 sec  CARDIAC MARKERS ( 2017 01:35 )  x     / x     / 321 U/L / x     / x      CARDIAC MARKERS ( 2017 14:41 )  x     / 0.75 ng/mL / 466 U/L / x     / 31.7 ng/mL        Oxygen Saturation, Mixed: 68 % ( @ 07:49)  Oxygen Saturation, Mixed: 68 % ( @ 20:07)  Oxygen Saturation, Mixed: 72 % ( @ 18:00)  Oxygen Saturation, Mixed: 75 % ( @ 16:19)  Oxygen Saturation, Mixed: 73 % ( @ 14:00)  Oxygen Saturation, Mixed: 70 % ( @ 12:09)  Oxygen Saturation, Mixed: 72 % ( @ 09:19)  Oxygen Saturation, Mixed: 70 % ( @ 08:08)  Oxygen Saturation, Mixed: 82 % ( @ 02:10)  Oxygen Saturation, Mixed: 88 % ( @ 14:24)  Oxygen Saturation, Mixed: 58 % ( @ 02:32)  Oxygen Saturation, Mixed: 54 % ( @ 21:16)  Oxygen Saturation, Mixed: 70 % ( @ 17:21)  Oxygen Saturation, Mixed: 54 % ( @ 13:42)    Lactate Dehydrogenase, Serum: 1098 U/L ( @ 01:35)  Lactate Dehydrogenase, Serum: 1341 U/L ( @ 02:13)  Lactate Dehydrogenase, Serum: 2083 U/L ( @ 02:34)  Lactate Dehydrogenase, Serum: 2170 U/L ( @ 21:18)  Lactate Dehydrogenase, Serum: 1959 U/L ( @ 17:19)  Lactate Dehydrogenase, Serum: 1808 U/L ( @ 14:11)

## 2017-06-14 NOTE — PROGRESS NOTE ADULT - ASSESSMENT
66y/o M with advanced NICM BiV HFrEF 15-20% on a stable dose of milrinone at 3mcg/kg/min via PICC s/p AICD, with replacement from [facility] to [employer] (2009), PAF on AC and h/o VT, with recent admission for AICD firing, with decompensated heart failure s/p LVAD on 6/12 post op with VT

## 2017-06-14 NOTE — PROGRESS NOTE ADULT - PROBLEM SELECTOR PLAN 1
hemodynamically mediated in setting of decompensated heart failure +/- urinary retention component and now hypotension.  Pt with uptrending  Scr to 2.47  (from 1.7  yesterday).  Pt non -oliguric (UO 1770).  After discussion with CTICU will start CVVHDF with 0-50cc/hr fluid removal as tolerated.  Strict I/O, avoid nephrotoxics, NSAIDs, RCA. Renal dose of medications.

## 2017-06-14 NOTE — PROGRESS NOTE ADULT - PROBLEM SELECTOR PLAN 1
He remains euvolemic but with elevated CVP, improved from yesterday and acceptable PAD. Diuretics were not maintaining equal I&Os so plan for CVVH today to keep net equal.

## 2017-06-14 NOTE — PROGRESS NOTE ADULT - SUBJECTIVE AND OBJECTIVE BOX
CRITICAL CARE ATTENDING - CTICU    MEDICATIONS  (STANDING):  sodium chloride 0.9%. 1000milliLiter(s) IV Continuous <Continuous>  famotidine Injectable 20milliGRAM(s) IV Push daily  docusate sodium 100milliGRAM(s) Oral three times a day  insulin Infusion 3Unit(s)/Hr IV Continuous <Continuous>  dextrose 5%. 1000milliLiter(s) IV Continuous <Continuous>  dextrose 50% Injectable 12.5Gram(s) IV Push once  dextrose 50% Injectable 25Gram(s) IV Push once  dextrose 50% Injectable 25Gram(s) IV Push once  milrinone Infusion 0.2MICROgram(s)/kG/Min IV Continuous <Continuous>  vasopressin Infusion 0.067Unit(s)/Min IV Continuous <Continuous>  levETIRAcetam  IVPB 500milliGRAM(s) IV Intermittent every 12 hours  aspirin  chewable 81milliGRAM(s) Oral daily  furosemide Infusion 20mG/Hr IV Continuous <Continuous>  chlorhexidine 0.12% Liquid 15milliLiter(s) Swish and Spit two times a day  heparin  Infusion 500Unit(s)/Hr IV Continuous <Continuous>  freetext medication -  Infusion 8milliGRAM(s) IV Continuous <Continuous>  propofol Infusion 11.111MICROgram(s)/kG/Min IV Continuous <Continuous>  norepinephrine Infusion 0.071MICROgram(s)/kG/Min IV Continuous <Continuous>  ciprofloxacin   IVPB 400milliGRAM(s) IV Intermittent every 12 hours  ciprofloxacin   IVPB  IV Intermittent   vancomycin  IVPB 1000milliGRAM(s) IV Intermittent every 12 hours  calcium gluconate IVPB 2Gram(s) IV Intermittent once                                    9.1    28.1  )-----------( 86       ( 2017 01:35 )             29.3       06-14    128<L>  |  96  |  35<H>  ----------------------------<  155<H>  5.0   |  16<L>  |  2.47<H>    Ca    8.3<L>      2017 01:35  Phos  2.0     06-12  Mg     2.7     06-12    TPro  5.9<L>  /  Alb  3.2<L>  /  TBili  5.1<H>  /  DBili  x   /  AST  157<H>  /  ALT  63<H>  /  AlkPhos  66        PT/INR - ( 2017 01:35 )   PT: 15.2 sec;   INR: 1.40 ratio         PTT - ( 2017 09:16 )  PTT:35.5 sec    Mode: AC/ CMV (Assist Control/ Continuous Mandatory Ventilation)  RR (machine): 10  TV (machine): 600  FiO2: 50  PEEP: 5  ITime: 1  MAP: 8  PIP: 21      Daily     Daily Weight in k.8 (2017 00:00)    I & Os for 24h ending  @ 07:00  =============================================  IN: 4424.3 ml / OUT: 2475 ml / NET: 1949.3 ml    I & Os for current day (as of  @ 10:58)  =============================================  IN: 548.1 ml / OUT: 300 ml / NET: 248.1 ml      Critically Ill patient  : [ ] preoperative ,   [x] post operative    Requires :  [x ] Arterial Line   [x ] Central Line  [x ] PA catheter  [ ] IABP  [ ] ECMO  [x ] LVAD  [ ] Ventilator  [x ] pacemaker [ ] Impella.    Bedside evaluation , monitoring , treatment of hemodynamics , fluids , IVP/ IVCD meds.        Diagnosis:     Cardiogenic Shock     CHF- acute [x]   chronic [ ]    systolic x ]   diatolic [ ]          - Echo- EF -15           [xx ] RV dysfunction          - Cxr-cardiomegally, edema          - Clinical-  [x ]inotropes   [x ]pressors   [x ]diuresis   [ ]IABP   [ ]ECMO   [x ]LVAD   [x ]Respiratory Failure    Hemodynamic lability,instability. Requires IVCD [x ] vasopressors [x ] inotropes [ ]IVSS fluid  to maintain MAP, perfusion, C.I.     respiratory failure     Renal Failure     Present, assisting, supervising:  [ ] intubation  [ ] A-Line  [ ] central line  [ ] Leon Cassidy catheter , [ ] chest tube [ ] [place]  [x ] Shiley  [ ] KALEIGH  [ ] Bronchoscopy   [ ] IABP  [x ] hemodialysis/filtration  [x ] CVVH-D  [ ]sono-site evaluation     Requires chest PT, pulmonary toilet, ambu bagging, suctioning to maintain SaO2,  patent airway and treat atelectasis.     fluid overload     h/o V Tach    s/p AICD-PPM    LVAD circuit    Leon Cassidy catheter interpretation and therapeutic management of unstable hemodynamics     Requires IVCD anticoagulation for LVAD and A Fib.    Ventilator Management:  [x ]AC-rest    [ ]CPAP-PS Wean    [ ]Trach Collar     [ ]Extubate    [ ] T-Piece  [x ]peep>5             -             Discussed with CT surgeon, Physician's Assistant - Nurse Practitioner- Critical care medicine team.   Dicussed at  AM / PM rounds.   Chart, labs , films reviewed.    Total Time: 180 min

## 2017-06-15 ENCOUNTER — APPOINTMENT (OUTPATIENT)
Dept: CARDIOLOGY | Facility: CLINIC | Age: 67
End: 2017-06-15

## 2017-06-15 DIAGNOSIS — D69.6 THROMBOCYTOPENIA, UNSPECIFIED: ICD-10-CM

## 2017-06-15 LAB
ALBUMIN SERPL ELPH-MCNC: 2.8 G/DL — LOW (ref 3.3–5)
ALBUMIN SERPL ELPH-MCNC: 3 G/DL — LOW (ref 3.3–5)
ALBUMIN SERPL ELPH-MCNC: 3.1 G/DL — LOW (ref 3.3–5)
ALP SERPL-CCNC: 65 U/L — SIGNIFICANT CHANGE UP (ref 40–120)
ALP SERPL-CCNC: 68 U/L — SIGNIFICANT CHANGE UP (ref 40–120)
ALP SERPL-CCNC: 74 U/L — SIGNIFICANT CHANGE UP (ref 40–120)
ALT FLD-CCNC: 52 U/L RC — HIGH (ref 10–45)
ALT FLD-CCNC: 54 U/L RC — HIGH (ref 10–45)
ALT FLD-CCNC: 57 U/L RC — HIGH (ref 10–45)
ANION GAP SERPL CALC-SCNC: 13 MMOL/L — SIGNIFICANT CHANGE UP (ref 5–17)
ANION GAP SERPL CALC-SCNC: 15 MMOL/L — SIGNIFICANT CHANGE UP (ref 5–17)
ANION GAP SERPL CALC-SCNC: 18 MMOL/L — HIGH (ref 5–17)
ANION GAP SERPL CALC-SCNC: 19 MMOL/L — HIGH (ref 5–17)
APTT BLD: 44.9 SEC — HIGH (ref 27.5–37.4)
APTT BLD: 62.8 SEC — HIGH (ref 27.5–37.4)
APTT BLD: 68.6 SEC — HIGH (ref 27.5–37.4)
APTT BLD: 71.6 SEC — HIGH (ref 27.5–37.4)
APTT BLD: 72.7 SEC — HIGH (ref 27.5–37.4)
APTT BLD: 73.6 SEC — HIGH (ref 27.5–37.4)
APTT BLD: 75.2 SEC — HIGH (ref 27.5–37.4)
AST SERPL-CCNC: 105 U/L — HIGH (ref 10–40)
AST SERPL-CCNC: 113 U/L — HIGH (ref 10–40)
AST SERPL-CCNC: 92 U/L — HIGH (ref 10–40)
BASE EXCESS BLDMV CALC-SCNC: -2.1 MMOL/L — SIGNIFICANT CHANGE UP (ref -3–3)
BASE EXCESS BLDMV CALC-SCNC: -2.7 MMOL/L — SIGNIFICANT CHANGE UP (ref -3–3)
BASE EXCESS BLDMV CALC-SCNC: -3.2 MMOL/L — LOW (ref -3–3)
BASE EXCESS BLDMV CALC-SCNC: -3.4 MMOL/L — LOW (ref -3–3)
BASE EXCESS BLDMV CALC-SCNC: -4 MMOL/L — LOW (ref -3–3)
BILIRUB SERPL-MCNC: 5.3 MG/DL — HIGH (ref 0.2–1.2)
BUN SERPL-MCNC: 28 MG/DL — HIGH (ref 7–23)
BUN SERPL-MCNC: 28 MG/DL — HIGH (ref 7–23)
BUN SERPL-MCNC: 30 MG/DL — HIGH (ref 7–23)
BUN SERPL-MCNC: 31 MG/DL — HIGH (ref 7–23)
CALCIUM SERPL-MCNC: 8.3 MG/DL — LOW (ref 8.4–10.5)
CALCIUM SERPL-MCNC: 8.5 MG/DL — SIGNIFICANT CHANGE UP (ref 8.4–10.5)
CALCIUM SERPL-MCNC: 8.6 MG/DL — SIGNIFICANT CHANGE UP (ref 8.4–10.5)
CALCIUM SERPL-MCNC: 9 MG/DL — SIGNIFICANT CHANGE UP (ref 8.4–10.5)
CHLORIDE SERPL-SCNC: 94 MMOL/L — LOW (ref 96–108)
CHLORIDE SERPL-SCNC: 94 MMOL/L — LOW (ref 96–108)
CHLORIDE SERPL-SCNC: 95 MMOL/L — LOW (ref 96–108)
CHLORIDE SERPL-SCNC: 96 MMOL/L — SIGNIFICANT CHANGE UP (ref 96–108)
CO2 BLDMV-SCNC: 22 MMOL/L — SIGNIFICANT CHANGE UP (ref 21–29)
CO2 BLDMV-SCNC: 23 MMOL/L — SIGNIFICANT CHANGE UP (ref 21–29)
CO2 BLDMV-SCNC: 24 MMOL/L — SIGNIFICANT CHANGE UP (ref 21–29)
CO2 SERPL-SCNC: 18 MMOL/L — LOW (ref 22–31)
CO2 SERPL-SCNC: 19 MMOL/L — LOW (ref 22–31)
CO2 SERPL-SCNC: 20 MMOL/L — LOW (ref 22–31)
CO2 SERPL-SCNC: 21 MMOL/L — LOW (ref 22–31)
CREAT SERPL-MCNC: 2.23 MG/DL — HIGH (ref 0.5–1.3)
CREAT SERPL-MCNC: 2.33 MG/DL — HIGH (ref 0.5–1.3)
CREAT SERPL-MCNC: 2.47 MG/DL — HIGH (ref 0.5–1.3)
CREAT SERPL-MCNC: 2.63 MG/DL — HIGH (ref 0.5–1.3)
GAS PNL BLDA: SIGNIFICANT CHANGE UP
GAS PNL BLDMV: SIGNIFICANT CHANGE UP
GLUCOSE SERPL-MCNC: 102 MG/DL — HIGH (ref 70–99)
GLUCOSE SERPL-MCNC: 119 MG/DL — HIGH (ref 70–99)
GLUCOSE SERPL-MCNC: 136 MG/DL — HIGH (ref 70–99)
GLUCOSE SERPL-MCNC: 154 MG/DL — HIGH (ref 70–99)
HAPTOGLOB SERPL-MCNC: 64 MG/DL — SIGNIFICANT CHANGE UP (ref 34–200)
HCO3 BLDMV-SCNC: 21 MMOL/L — SIGNIFICANT CHANGE UP (ref 20–28)
HCO3 BLDMV-SCNC: 22 MMOL/L — SIGNIFICANT CHANGE UP (ref 20–28)
HCO3 BLDMV-SCNC: 23 MMOL/L — SIGNIFICANT CHANGE UP (ref 20–28)
HCT VFR BLD CALC: 24.8 % — LOW (ref 39–50)
HCT VFR BLD CALC: 25.4 % — LOW (ref 39–50)
HCT VFR BLD CALC: 25.8 % — LOW (ref 39–50)
HCT VFR BLD CALC: 25.8 % — LOW (ref 39–50)
HGB BLD-MCNC: 7.9 G/DL — LOW (ref 13–17)
HGB BLD-MCNC: 8 G/DL — LOW (ref 13–17)
HGB BLD-MCNC: 8.2 G/DL — LOW (ref 13–17)
HGB BLD-MCNC: 8.3 G/DL — LOW (ref 13–17)
HOROWITZ INDEX BLDMV+IHG-RTO: 50 — SIGNIFICANT CHANGE UP
INR BLD: 1.36 RATIO — HIGH (ref 0.88–1.16)
INR BLD: 1.47 RATIO — HIGH (ref 0.88–1.16)
INR BLD: 1.79 RATIO — HIGH (ref 0.88–1.16)
INR BLD: 2.16 RATIO — HIGH (ref 0.88–1.16)
INR BLD: 2.2 RATIO — HIGH (ref 0.88–1.16)
INR BLD: 2.34 RATIO — HIGH (ref 0.88–1.16)
LDH SERPL L TO P-CCNC: 889 U/L — HIGH (ref 50–242)
MAGNESIUM SERPL-MCNC: 2.3 MG/DL — SIGNIFICANT CHANGE UP (ref 1.6–2.6)
MAGNESIUM SERPL-MCNC: 2.7 MG/DL — HIGH (ref 1.6–2.6)
MCHC RBC-ENTMCNC: 27.3 PG — SIGNIFICANT CHANGE UP (ref 27–34)
MCHC RBC-ENTMCNC: 27.9 PG — SIGNIFICANT CHANGE UP (ref 27–34)
MCHC RBC-ENTMCNC: 28.7 PG — SIGNIFICANT CHANGE UP (ref 27–34)
MCHC RBC-ENTMCNC: 28.8 PG — SIGNIFICANT CHANGE UP (ref 27–34)
MCHC RBC-ENTMCNC: 30.7 GM/DL — LOW (ref 32–36)
MCHC RBC-ENTMCNC: 31.8 GM/DL — LOW (ref 32–36)
MCHC RBC-ENTMCNC: 32.4 GM/DL — SIGNIFICANT CHANGE UP (ref 32–36)
MCHC RBC-ENTMCNC: 32.6 GM/DL — SIGNIFICANT CHANGE UP (ref 32–36)
MCV RBC AUTO: 87.7 FL — SIGNIFICANT CHANGE UP (ref 80–100)
MCV RBC AUTO: 88.3 FL — SIGNIFICANT CHANGE UP (ref 80–100)
MCV RBC AUTO: 88.6 FL — SIGNIFICANT CHANGE UP (ref 80–100)
MCV RBC AUTO: 88.8 FL — SIGNIFICANT CHANGE UP (ref 80–100)
O2 CT VFR BLD CALC: 34 MMHG — SIGNIFICANT CHANGE UP (ref 30–65)
O2 CT VFR BLD CALC: 37 MMHG — SIGNIFICANT CHANGE UP (ref 30–65)
O2 CT VFR BLD CALC: 37 MMHG — SIGNIFICANT CHANGE UP (ref 30–65)
O2 CT VFR BLD CALC: 39 MMHG — SIGNIFICANT CHANGE UP (ref 30–65)
O2 CT VFR BLD CALC: 40 MMHG — SIGNIFICANT CHANGE UP (ref 30–65)
PCO2 BLDMV: 37 MMHG — SIGNIFICANT CHANGE UP (ref 30–65)
PCO2 BLDMV: 38 MMHG — SIGNIFICANT CHANGE UP (ref 30–65)
PCO2 BLDMV: 40 MMHG — SIGNIFICANT CHANGE UP (ref 30–65)
PCO2 BLDMV: 40 MMHG — SIGNIFICANT CHANGE UP (ref 30–65)
PCO2 BLDMV: 46 MMHG — SIGNIFICANT CHANGE UP (ref 30–65)
PF4 HEPARIN CMPLX AB SER-ACNC: NEGATIVE — SIGNIFICANT CHANGE UP
PF4 HEPARIN CMPLX AB SERPL QL IA: 0.39 ABS — SIGNIFICANT CHANGE UP
PH BLDMV: 7.32 — SIGNIFICANT CHANGE UP (ref 7.32–7.45)
PH BLDMV: 7.35 — SIGNIFICANT CHANGE UP (ref 7.32–7.45)
PH BLDMV: 7.35 — SIGNIFICANT CHANGE UP (ref 7.32–7.45)
PH BLDMV: 7.36 — SIGNIFICANT CHANGE UP (ref 7.32–7.45)
PH BLDMV: 7.37 — SIGNIFICANT CHANGE UP (ref 7.32–7.45)
PHOSPHATE SERPL-MCNC: 4.1 MG/DL — SIGNIFICANT CHANGE UP (ref 2.5–4.5)
PLATELET # BLD AUTO: 19 K/UL — CRITICAL LOW (ref 150–400)
PLATELET # BLD AUTO: 20 K/UL — CRITICAL LOW (ref 150–400)
PLATELET # BLD AUTO: 22 K/UL — LOW (ref 150–400)
PLATELET # BLD AUTO: 25 K/UL — LOW (ref 150–400)
POTASSIUM SERPL-MCNC: 4.2 MMOL/L — SIGNIFICANT CHANGE UP (ref 3.5–5.3)
POTASSIUM SERPL-MCNC: 4.3 MMOL/L — SIGNIFICANT CHANGE UP (ref 3.5–5.3)
POTASSIUM SERPL-MCNC: 4.6 MMOL/L — SIGNIFICANT CHANGE UP (ref 3.5–5.3)
POTASSIUM SERPL-MCNC: 4.7 MMOL/L — SIGNIFICANT CHANGE UP (ref 3.5–5.3)
POTASSIUM SERPL-SCNC: 4.2 MMOL/L — SIGNIFICANT CHANGE UP (ref 3.5–5.3)
POTASSIUM SERPL-SCNC: 4.3 MMOL/L — SIGNIFICANT CHANGE UP (ref 3.5–5.3)
POTASSIUM SERPL-SCNC: 4.6 MMOL/L — SIGNIFICANT CHANGE UP (ref 3.5–5.3)
POTASSIUM SERPL-SCNC: 4.7 MMOL/L — SIGNIFICANT CHANGE UP (ref 3.5–5.3)
PROT SERPL-MCNC: 5.6 G/DL — LOW (ref 6–8.3)
PROT SERPL-MCNC: 5.7 G/DL — LOW (ref 6–8.3)
PROT SERPL-MCNC: 5.8 G/DL — LOW (ref 6–8.3)
PROTHROM AB SERPL-ACNC: 14.9 SEC — HIGH (ref 9.8–12.7)
PROTHROM AB SERPL-ACNC: 16.1 SEC — HIGH (ref 9.8–12.7)
PROTHROM AB SERPL-ACNC: 19.7 SEC — HIGH (ref 9.8–12.7)
PROTHROM AB SERPL-ACNC: 23.7 SEC — HIGH (ref 9.8–12.7)
PROTHROM AB SERPL-ACNC: 24.1 SEC — HIGH (ref 9.8–12.7)
PROTHROM AB SERPL-ACNC: 25.7 SEC — HIGH (ref 9.8–12.7)
RBC # BLD: 2.8 M/UL — LOW (ref 4.2–5.8)
RBC # BLD: 2.87 M/UL — LOW (ref 4.2–5.8)
RBC # BLD: 2.91 M/UL — LOW (ref 4.2–5.8)
RBC # BLD: 2.95 M/UL — LOW (ref 4.2–5.8)
RBC # FLD: 19.4 % — HIGH (ref 10.3–14.5)
RBC # FLD: 19.7 % — HIGH (ref 10.3–14.5)
RBC # FLD: 20.9 % — HIGH (ref 10.3–14.5)
RBC # FLD: 21.1 % — HIGH (ref 10.3–14.5)
SAO2 % BLDMV: 63 % — SIGNIFICANT CHANGE UP (ref 60–90)
SAO2 % BLDMV: 63 % — SIGNIFICANT CHANGE UP (ref 60–90)
SAO2 % BLDMV: 66 % — SIGNIFICANT CHANGE UP (ref 60–90)
SAO2 % BLDMV: 70 % — SIGNIFICANT CHANGE UP (ref 60–90)
SAO2 % BLDMV: 71 % — SIGNIFICANT CHANGE UP (ref 60–90)
SODIUM SERPL-SCNC: 129 MMOL/L — LOW (ref 135–145)
SODIUM SERPL-SCNC: 130 MMOL/L — LOW (ref 135–145)
SODIUM SERPL-SCNC: 131 MMOL/L — LOW (ref 135–145)
SODIUM SERPL-SCNC: 132 MMOL/L — LOW (ref 135–145)
VANCOMYCIN TROUGH SERPL-MCNC: 24.3 UG/ML — HIGH (ref 10–20)
WBC # BLD: 21 K/UL — HIGH (ref 3.8–10.5)
WBC # BLD: 21.7 K/UL — HIGH (ref 3.8–10.5)
WBC # BLD: 22.3 K/UL — HIGH (ref 3.8–10.5)
WBC # BLD: 23 K/UL — HIGH (ref 3.8–10.5)
WBC # FLD AUTO: 21 K/UL — HIGH (ref 3.8–10.5)
WBC # FLD AUTO: 21.7 K/UL — HIGH (ref 3.8–10.5)
WBC # FLD AUTO: 22.3 K/UL — HIGH (ref 3.8–10.5)
WBC # FLD AUTO: 23 K/UL — HIGH (ref 3.8–10.5)

## 2017-06-15 PROCEDURE — 99292 CRITICAL CARE ADDL 30 MIN: CPT

## 2017-06-15 PROCEDURE — 95951: CPT | Mod: 26

## 2017-06-15 PROCEDURE — 93926 LOWER EXTREMITY STUDY: CPT | Mod: 26,RT

## 2017-06-15 PROCEDURE — 99291 CRITICAL CARE FIRST HOUR: CPT

## 2017-06-15 PROCEDURE — 71010: CPT | Mod: 26

## 2017-06-15 PROCEDURE — 93970 EXTREMITY STUDY: CPT | Mod: 26

## 2017-06-15 PROCEDURE — 99233 SBSQ HOSP IP/OBS HIGH 50: CPT | Mod: GC

## 2017-06-15 PROCEDURE — 93750 INTERROGATION VAD IN PERSON: CPT

## 2017-06-15 PROCEDURE — 99233 SBSQ HOSP IP/OBS HIGH 50: CPT | Mod: 25,GC

## 2017-06-15 PROCEDURE — 95957 EEG DIGITAL ANALYSIS: CPT | Mod: 26

## 2017-06-15 RX ORDER — VASOPRESSIN 20 [USP'U]/ML
0.03 INJECTION INTRAVENOUS
Qty: 100 | Refills: 0 | Status: DISCONTINUED | OUTPATIENT
Start: 2017-06-15 | End: 2017-06-15

## 2017-06-15 RX ORDER — AMIODARONE HYDROCHLORIDE 400 MG/1
150 TABLET ORAL ONCE
Qty: 0 | Refills: 0 | Status: COMPLETED | OUTPATIENT
Start: 2017-06-15 | End: 2017-06-15

## 2017-06-15 RX ORDER — MILRINONE LACTATE 1 MG/ML
0.2 INJECTION, SOLUTION INTRAVENOUS
Qty: 20 | Refills: 0 | Status: DISCONTINUED | OUTPATIENT
Start: 2017-06-15 | End: 2017-06-15

## 2017-06-15 RX ORDER — POTASSIUM CHLORIDE 20 MEQ
10 PACKET (EA) ORAL ONCE
Qty: 0 | Refills: 0 | Status: COMPLETED | OUTPATIENT
Start: 2017-06-15 | End: 2017-06-15

## 2017-06-15 RX ORDER — FUROSEMIDE 40 MG
5 TABLET ORAL
Qty: 500 | Refills: 0 | Status: DISCONTINUED | OUTPATIENT
Start: 2017-06-15 | End: 2017-06-19

## 2017-06-15 RX ORDER — MAGNESIUM SULFATE 500 MG/ML
1 VIAL (ML) INJECTION ONCE
Qty: 0 | Refills: 0 | Status: COMPLETED | OUTPATIENT
Start: 2017-06-15 | End: 2017-06-15

## 2017-06-15 RX ORDER — FENTANYL CITRATE 50 UG/ML
50 INJECTION INTRAVENOUS ONCE
Qty: 0 | Refills: 0 | Status: DISCONTINUED | OUTPATIENT
Start: 2017-06-15 | End: 2017-06-15

## 2017-06-15 RX ORDER — AMIODARONE HYDROCHLORIDE 400 MG/1
200 TABLET ORAL DAILY
Qty: 0 | Refills: 0 | Status: COMPLETED | OUTPATIENT
Start: 2017-06-19 | End: 2018-05-18

## 2017-06-15 RX ORDER — VASOPRESSIN 20 [USP'U]/ML
0.07 INJECTION INTRAVENOUS
Qty: 100 | Refills: 0 | Status: DISCONTINUED | OUTPATIENT
Start: 2017-06-15 | End: 2017-06-19

## 2017-06-15 RX ORDER — PANTOPRAZOLE SODIUM 20 MG/1
40 TABLET, DELAYED RELEASE ORAL EVERY 24 HOURS
Qty: 0 | Refills: 0 | Status: DISCONTINUED | OUTPATIENT
Start: 2017-06-15 | End: 2017-06-21

## 2017-06-15 RX ORDER — FENTANYL CITRATE 50 UG/ML
25 INJECTION INTRAVENOUS ONCE
Qty: 0 | Refills: 0 | Status: DISCONTINUED | OUTPATIENT
Start: 2017-06-15 | End: 2017-06-15

## 2017-06-15 RX ORDER — VASOPRESSIN 20 [USP'U]/ML
0.07 INJECTION INTRAVENOUS
Qty: 100 | Refills: 0 | Status: DISCONTINUED | OUTPATIENT
Start: 2017-06-15 | End: 2017-06-15

## 2017-06-15 RX ORDER — AMIODARONE HYDROCHLORIDE 400 MG/1
400 TABLET ORAL EVERY 8 HOURS
Qty: 0 | Refills: 0 | Status: COMPLETED | OUTPATIENT
Start: 2017-06-15 | End: 2017-06-18

## 2017-06-15 RX ORDER — HYDROMORPHONE HYDROCHLORIDE 2 MG/ML
0.5 INJECTION INTRAMUSCULAR; INTRAVENOUS; SUBCUTANEOUS ONCE
Qty: 0 | Refills: 0 | Status: DISCONTINUED | OUTPATIENT
Start: 2017-06-15 | End: 2017-06-15

## 2017-06-15 RX ORDER — POTASSIUM CHLORIDE 20 MEQ
10 PACKET (EA) ORAL
Qty: 0 | Refills: 0 | Status: COMPLETED | OUTPATIENT
Start: 2017-06-15 | End: 2017-06-15

## 2017-06-15 RX ORDER — ARGATROBAN 50 MG/50ML
0.2 INJECTION, SOLUTION INTRAVENOUS
Qty: 250 | Refills: 0 | Status: DISCONTINUED | OUTPATIENT
Start: 2017-06-15 | End: 2017-06-18

## 2017-06-15 RX ADMIN — Medication 100 GRAM(S): at 04:20

## 2017-06-15 RX ADMIN — Medication 50 MILLIEQUIVALENT(S): at 21:00

## 2017-06-15 RX ADMIN — AMIODARONE HYDROCHLORIDE 400 MILLIGRAM(S): 400 TABLET ORAL at 14:06

## 2017-06-15 RX ADMIN — Medication 200 MILLIGRAM(S): at 05:11

## 2017-06-15 RX ADMIN — PANTOPRAZOLE SODIUM 40 MILLIGRAM(S): 20 TABLET, DELAYED RELEASE ORAL at 21:56

## 2017-06-15 RX ADMIN — Medication 5 MICROGRAM(S)/KG/MIN: at 16:45

## 2017-06-15 RX ADMIN — VASOPRESSIN 4 UNIT(S)/MIN: 20 INJECTION INTRAVENOUS at 09:34

## 2017-06-15 RX ADMIN — Medication 10 MG/HR: at 16:45

## 2017-06-15 RX ADMIN — ARGATROBAN 1.8 MICROGRAM(S)/KG/MIN: 50 INJECTION, SOLUTION INTRAVENOUS at 09:39

## 2017-06-15 RX ADMIN — INSULIN HUMAN 3 UNIT(S)/HR: 100 INJECTION, SOLUTION SUBCUTANEOUS at 16:45

## 2017-06-15 RX ADMIN — ARGATROBAN 0.9 MICROGRAM(S)/KG/MIN: 50 INJECTION, SOLUTION INTRAVENOUS at 20:37

## 2017-06-15 RX ADMIN — FENTANYL CITRATE 25 MICROGRAM(S): 50 INJECTION INTRAVENOUS at 15:30

## 2017-06-15 RX ADMIN — AMIODARONE HYDROCHLORIDE 600 MILLIGRAM(S): 400 TABLET ORAL at 19:30

## 2017-06-15 RX ADMIN — AMIODARONE HYDROCHLORIDE 33.33 MG/MIN: 400 TABLET ORAL at 03:04

## 2017-06-15 RX ADMIN — VASOPRESSIN 4 UNIT(S)/MIN: 20 INJECTION INTRAVENOUS at 03:03

## 2017-06-15 RX ADMIN — HYDROMORPHONE HYDROCHLORIDE 0.5 MILLIGRAM(S): 2 INJECTION INTRAMUSCULAR; INTRAVENOUS; SUBCUTANEOUS at 18:40

## 2017-06-15 RX ADMIN — Medication 100 GRAM(S): at 12:00

## 2017-06-15 RX ADMIN — SODIUM CHLORIDE 10 MILLILITER(S): 9 INJECTION INTRAMUSCULAR; INTRAVENOUS; SUBCUTANEOUS at 09:35

## 2017-06-15 RX ADMIN — ARGATROBAN 0.9 MICROGRAM(S)/KG/MIN: 50 INJECTION, SOLUTION INTRAVENOUS at 03:04

## 2017-06-15 RX ADMIN — AMIODARONE HYDROCHLORIDE 400 MILLIGRAM(S): 400 TABLET ORAL at 21:18

## 2017-06-15 RX ADMIN — Medication 5 MICROGRAM(S)/KG/MIN: at 09:33

## 2017-06-15 RX ADMIN — CHLORHEXIDINE GLUCONATE 15 MILLILITER(S): 213 SOLUTION TOPICAL at 17:00

## 2017-06-15 RX ADMIN — AMIODARONE HYDROCHLORIDE 400 MILLIGRAM(S): 400 TABLET ORAL at 09:31

## 2017-06-15 RX ADMIN — Medication 50 MILLIEQUIVALENT(S): at 12:30

## 2017-06-15 RX ADMIN — CHLORHEXIDINE GLUCONATE 15 MILLILITER(S): 213 SOLUTION TOPICAL at 05:11

## 2017-06-15 RX ADMIN — FAMOTIDINE 20 MILLIGRAM(S): 10 INJECTION INTRAVENOUS at 11:19

## 2017-06-15 RX ADMIN — ARGATROBAN 0.9 MICROGRAM(S)/KG/MIN: 50 INJECTION, SOLUTION INTRAVENOUS at 14:49

## 2017-06-15 RX ADMIN — INSULIN HUMAN 3 UNIT(S)/HR: 100 INJECTION, SOLUTION SUBCUTANEOUS at 03:03

## 2017-06-15 RX ADMIN — ARGATROBAN 0.9 MICROGRAM(S)/KG/MIN: 50 INJECTION, SOLUTION INTRAVENOUS at 23:40

## 2017-06-15 RX ADMIN — VASOPRESSIN 4 UNIT(S)/MIN: 20 INJECTION INTRAVENOUS at 16:45

## 2017-06-15 RX ADMIN — FENTANYL CITRATE 50 MICROGRAM(S): 50 INJECTION INTRAVENOUS at 10:15

## 2017-06-15 RX ADMIN — FENTANYL CITRATE 25 MICROGRAM(S): 50 INJECTION INTRAVENOUS at 15:15

## 2017-06-15 RX ADMIN — MILRINONE LACTATE 4.5 MICROGRAM(S)/KG/MIN: 1 INJECTION, SOLUTION INTRAVENOUS at 09:00

## 2017-06-15 RX ADMIN — INSULIN HUMAN 3 UNIT(S)/HR: 100 INJECTION, SOLUTION SUBCUTANEOUS at 09:32

## 2017-06-15 RX ADMIN — Medication 50 MILLIEQUIVALENT(S): at 21:24

## 2017-06-15 RX ADMIN — FENTANYL CITRATE 50 MICROGRAM(S): 50 INJECTION INTRAVENOUS at 10:30

## 2017-06-15 RX ADMIN — PROPOFOL 5 MICROGRAM(S)/KG/MIN: 10 INJECTION, EMULSION INTRAVENOUS at 03:04

## 2017-06-15 RX ADMIN — VASOPRESSIN 4 UNIT(S)/MIN: 20 INJECTION INTRAVENOUS at 20:37

## 2017-06-15 RX ADMIN — AMIODARONE HYDROCHLORIDE 16.67 MG/MIN: 400 TABLET ORAL at 09:31

## 2017-06-15 RX ADMIN — Medication 10 MG/HR: at 22:52

## 2017-06-15 RX ADMIN — Medication 10 MG/HR: at 20:37

## 2017-06-15 RX ADMIN — PROPOFOL 5 MICROGRAM(S)/KG/MIN: 10 INJECTION, EMULSION INTRAVENOUS at 20:36

## 2017-06-15 RX ADMIN — MILRINONE LACTATE 4.5 MICROGRAM(S)/KG/MIN: 1 INJECTION, SOLUTION INTRAVENOUS at 20:36

## 2017-06-15 RX ADMIN — Medication 50 MILLIEQUIVALENT(S): at 22:00

## 2017-06-15 RX ADMIN — MILRINONE LACTATE 4.5 MICROGRAM(S)/KG/MIN: 1 INJECTION, SOLUTION INTRAVENOUS at 03:04

## 2017-06-15 RX ADMIN — PROPOFOL 5 MICROGRAM(S)/KG/MIN: 10 INJECTION, EMULSION INTRAVENOUS at 09:35

## 2017-06-15 RX ADMIN — ARGATROBAN 1.35 MICROGRAM(S)/KG/MIN: 50 INJECTION, SOLUTION INTRAVENOUS at 14:08

## 2017-06-15 RX ADMIN — ARGATROBAN 0.9 MICROGRAM(S)/KG/MIN: 50 INJECTION, SOLUTION INTRAVENOUS at 19:49

## 2017-06-15 RX ADMIN — AMIODARONE HYDROCHLORIDE 16.67 MG/MIN: 400 TABLET ORAL at 20:37

## 2017-06-15 RX ADMIN — Medication 10 MG/HR: at 09:33

## 2017-06-15 RX ADMIN — INSULIN HUMAN 3 UNIT(S)/HR: 100 INJECTION, SOLUTION SUBCUTANEOUS at 20:37

## 2017-06-15 RX ADMIN — SODIUM CHLORIDE 10 MILLILITER(S): 9 INJECTION INTRAMUSCULAR; INTRAVENOUS; SUBCUTANEOUS at 20:46

## 2017-06-15 RX ADMIN — LEVETIRACETAM 400 MILLIGRAM(S): 250 TABLET, FILM COATED ORAL at 05:10

## 2017-06-15 RX ADMIN — Medication 5 MICROGRAM(S)/KG/MIN: at 03:04

## 2017-06-15 RX ADMIN — Medication 10 MG/HR: at 03:04

## 2017-06-15 RX ADMIN — Medication 5 MICROGRAM(S)/KG/MIN: at 20:37

## 2017-06-15 RX ADMIN — HYDROMORPHONE HYDROCHLORIDE 0.5 MILLIGRAM(S): 2 INJECTION INTRAMUSCULAR; INTRAVENOUS; SUBCUTANEOUS at 18:55

## 2017-06-15 RX ADMIN — MILRINONE LACTATE 4.5 MICROGRAM(S)/KG/MIN: 1 INJECTION, SOLUTION INTRAVENOUS at 18:54

## 2017-06-15 NOTE — PROGRESS NOTE ADULT - ATTENDING COMMENTS
He continues to make progress with decreased need for vasopressors and inotropes. While his creatinine is increasing, he is making good urine output. He was only on CVVH overnight from 6/14-6/15 but it was stopped due to clotted circuit with concern for HIT. For now we will continue to attempt to wean off vasopressors and inotropes and continue to maintain urine output with goal to move towards extubation.

## 2017-06-15 NOTE — PROGRESS NOTE ADULT - SUBJECTIVE AND OBJECTIVE BOX
Subjective:    Pt seen and examined at bedside. He remains intubated and on light sedation. Per pt's nurse, pt still with intermittent twitching of shoulders.     MEDICATIONS  (STANDING):  sodium chloride 0.9%. 1000milliLiter(s) IV Continuous <Continuous>  famotidine Injectable 20milliGRAM(s) IV Push daily  docusate sodium 100milliGRAM(s) Oral three times a day  insulin Infusion 3Unit(s)/Hr IV Continuous <Continuous>  dextrose 5%. 1000milliLiter(s) IV Continuous <Continuous>  dextrose 50% Injectable 12.5Gram(s) IV Push once  dextrose 50% Injectable 25Gram(s) IV Push once  dextrose 50% Injectable 25Gram(s) IV Push once  vasopressin Infusion 0.067Unit(s)/Min IV Continuous <Continuous>  furosemide Infusion 20mG/Hr IV Continuous <Continuous>  chlorhexidine 0.12% Liquid 15milliLiter(s) Swish and Spit two times a day  freetext medication -  Infusion 8milliGRAM(s) IV Continuous <Continuous>  propofol Infusion 11.111MICROgram(s)/kG/Min IV Continuous <Continuous>  norepinephrine Infusion 0.071MICROgram(s)/kG/Min IV Continuous <Continuous>  amiodarone Infusion 0.5mG/Min IV Continuous <Continuous>  sodium chloride 0.9% 1000milliLiter(s) IV Continuous <Continuous>  argatroban Infusion 0.4MICROgram(s)/kG/Min IV Continuous <Continuous>  amiodarone    Tablet 400milliGRAM(s) Oral every 8 hours    MEDICATIONS  (PRN):  dextrose Gel 1Dose(s) Oral once PRN Blood Glucose LESS THAN 70 milliGRAM(s)/deciLiter  glucagon  Injectable 1milliGRAM(s) IntraMuscular once PRN Glucose <70 milliGRAM(s)/deciLiter      Allergies  No Known Allergies      Objective:   Vital Signs Last 24 Hrs  T(C): 36.7, Max: 37.1 (06-14 @ 23:00)  T(F): 98.1, Max: 98.8 (06-14 @ 23:00)  HR: 88 (68 - 99)  BP: --  BP(mean): --  RR: 12 (0 - 25)  SpO2: 100% (85% - 100%)    Exam:   intubated, moves to tactile noxious stimuli  blinks to threat b/l, face grossly symmetric  spontaneously moves all 4 ext  withdraws to pain x 4  no clonus  +asterixis     Other:  CBC Full  -  ( 15 Kennedy 2017 06:02 )  WBC Count : 21.7 K/uL  Hemoglobin : 7.9 g/dL  Hematocrit : 25.8 %  Platelet Count - Automated : 22 K/uL  Mean Cell Volume : 88.8 fl  Mean Cell Hemoglobin : 27.3 pg  Mean Cell Hemoglobin Concentration : 30.7 gm/dL  Auto Neutrophil # : x  Auto Lymphocyte # : x  Auto Monocyte # : x  Auto Eosinophil # : x  Auto Basophil # : x  Auto Neutrophil % : x  Auto Lymphocyte % : x  Auto Monocyte % : x  Auto Eosinophil % : x  Auto Basophil % : x    06-15    131<L>  |  96  |  30<H>  ----------------------------<  136<H>  4.3   |  20<L>  |  2.47<H>    Ca    8.5      15 Kennedy 2017 06:02  Phos  4.1     06-14  Mg     2.3     06-14    TPro  5.7<L>  /  Alb  3.1<L>  /  TBili  5.3<H>  /  DBili  x   /  AST  105<H>  /  ALT  54<H>  /  AlkPhos  68  06-15    06-15    131<L>  |  96  |  30<H>  ----------------------------<  136<H>  4.3   |  20<L>  |  2.47<H>    Ca    8.5      15 Kennedy 2017 06:02  Phos  4.1     06-14  Mg     2.3     06-14    TPro  5.7<L>  /  Alb  3.1<L>  /  TBili  5.3<H>  /  DBili  x   /  AST  105<H>  /  ALT  54<H>  /  AlkPhos  68  06-15    LIVER FUNCTIONS - ( 15 Kennedy 2017 06:02 )  Alb: 3.1 g/dL / Pro: 5.7 g/dL / ALK PHOS: 68 U/L / ALT: 54 U/L RC / AST: 105 U/L / GGT: x             VEEG: moderate to severe diffuse or multifocal cerebral dysfunction

## 2017-06-15 NOTE — PROGRESS NOTE ADULT - PROBLEM SELECTOR PLAN 1
hemodynamically mediated in setting of decompensated heart failure +/- urinary retention component and now hypotension.  Pt with up trending  Scr to 2.47  (from 2.3  yesterday).  Pt non -oliguric (UO 2100).  Pt was started on  CVVHDF yesterday tolerated 25cc/hr fluid removal. Now off CVVHD. Monitor BMP  Strict I/O, avoid nephrotoxics, NSAIDs, RCA. Renal dose of medications.

## 2017-06-15 NOTE — PROVIDER CONTACT NOTE (OTHER) - ASSESSMENT
remains intubated and sedated. CO/CI monitored continuously via Vigilance, CO/CI performed hourly via thermodilution. CO/CI previously 2.5-3.6, now 1.3-1.7 following decrease in epi from 0.011 to 0.004.

## 2017-06-15 NOTE — PROGRESS NOTE ADULT - SUBJECTIVE AND OBJECTIVE BOX
Interval History:    Clotted off CVVH last night but has adequate u/o without, so currently off of CVVH. Additionally, had a concomitant drop in Plts, so off of heparin and on argatroban while awaiting HIT panel results. With sedation lightened responds to voice.    Medications:  sodium chloride 0.9%. 1000milliLiter(s) IV Continuous <Continuous>  famotidine Injectable 20milliGRAM(s) IV Push daily  docusate sodium 100milliGRAM(s) Oral three times a day  insulin Infusion 3Unit(s)/Hr IV Continuous <Continuous>  dextrose 5%. 1000milliLiter(s) IV Continuous <Continuous>  dextrose Gel 1Dose(s) Oral once PRN  dextrose 50% Injectable 12.5Gram(s) IV Push once  dextrose 50% Injectable 25Gram(s) IV Push once  dextrose 50% Injectable 25Gram(s) IV Push once  glucagon  Injectable 1milliGRAM(s) IntraMuscular once PRN  vasopressin Infusion 0.067Unit(s)/Min IV Continuous <Continuous>  furosemide Infusion 20mG/Hr IV Continuous <Continuous>  chlorhexidine 0.12% Liquid 15milliLiter(s) Swish and Spit two times a day  freetext medication -  Infusion 8milliGRAM(s) IV Continuous <Continuous>  propofol Infusion 11.111MICROgram(s)/kG/Min IV Continuous <Continuous>  norepinephrine Infusion 0.071MICROgram(s)/kG/Min IV Continuous <Continuous>  amiodarone Infusion 0.5mG/Min IV Continuous <Continuous>  sodium chloride 0.9% 1000milliLiter(s) IV Continuous <Continuous>  argatroban Infusion 0.4MICROgram(s)/kG/Min IV Continuous <Continuous>  amiodarone    Tablet 400milliGRAM(s) Oral every 8 hours    Vitals:  T(C): 36.7, Max: 37.1 (06-14 @ 23:00)  HR: 88 (68 - 99)  BP: --  BP(mean): --  ABP: 92/81 (60/49 - 102/86)  ABP(mean): 86 (54 - 93)  RR: 12 (0 - 25)  SpO2: 100% (85% - 100%)  Wt(kg): --  CVP(cm H2O): --  CO: 4.2 (4.1 - 6.8)  CI: 2.3 (2.1 - 3.6)  PA: 45/19 (37/14 - 53/23)  PA(mean): 29 (23 - 34)  PCWP: --  SVR: 1312 (564 - 1312)  PVR: --    Daily     Daily Weight in k.8 (15 Kennedy 2017 00:00)        I&O's Summary  I & Os for 24h ending 15 Kennedy 2017 07:00  =============================================  IN: 3789.4 ml / OUT: 4102 ml / NET: -312.6 ml    I & Os for current day (as of 15 Kennedy 2017 11:27)  =============================================  IN: 381.6 ml / OUT: 760 ml / NET: -378.4 ml      Physical Exam:  Appearance: Intubated, sedated  HEENT: Lines in place  Cardiovascular: Quiet S1 S2, + LVAD hum, surgical dressing C/D/I  Respiratory: Clear to auscultation anteriorly  Gastrointestinal:  Soft, Non-tender	  Skin: No cyanosis	  Neurologic: Sedated  Extremities: No edema  Psychiatry: Sedated    LVAD Interrogation:  Pump Flow:4.4  Pump Speed:9200rpm  Pulse Index:5.8  Pump Power:5.2  VAD Events: No events  Driveline evaluation: Dressing C/D/I   Programming Changes: [ ] No changes made [ ] Changes made:    Labs:                        7.9    21.7  )-----------( 22       ( 15 Kennedy 2017 06:02 )             25.8     06-15    131<L>  |  96  |  30<H>  ----------------------------<  136<H>  4.3   |  20<L>  |  2.47<H>    Ca    8.5      15 Kennedy 2017 06:02  Phos  4.1     06-14  Mg     2.3     06-14    TPro  5.7<L>  /  Alb  3.1<L>  /  TBili  5.3<H>  /  DBili  x   /  AST  105<H>  /  ALT  54<H>  /  AlkPhos  68  06-15    PT/INR - ( 15 Kennedy 2017 06:02 )   PT: 19.7 sec;   INR: 1.79 ratio         PTT - ( 15 Kennedy 2017 10:17 )  PTT:73.6 sec  CARDIAC MARKERS ( 2017 01:35 )  x     / x     / 321 U/L / x     / x            Oxygen Saturation, Mixed: 63 % (06-15 @ 10:07)  Oxygen Saturation, Mixed: 70 % (06-15 @ 05:57)  Oxygen Saturation, Mixed: 71 % (06-15 @ 02:06)  Oxygen Saturation, Mixed: 66 % ( @ 21:52)  Oxygen Saturation, Mixed: 65 % ( @ 15:56)  Oxygen Saturation, Mixed: 68 % ( @ 07:49)  Oxygen Saturation, Mixed: 68 % ( @ 20:07)  Oxygen Saturation, Mixed: 72 % ( @ 18:00)  Oxygen Saturation, Mixed: 75 % ( @ 16:19)  Oxygen Saturation, Mixed: 73 % ( @ 14:00)  Oxygen Saturation, Mixed: 70 % ( @ 12:09)  Oxygen Saturation, Mixed: 72 % ( @ 09:19)  Oxygen Saturation, Mixed: 70 % ( @ 08:08)  Oxygen Saturation, Mixed: 82 % ( @ 02:10)  Oxygen Saturation, Mixed: 88 % ( @ 14:24)    Lactate Dehydrogenase, Serum: 889 U/L (06-15 @ 02:06)  Lactate Dehydrogenase, Serum: 1098 U/L ( @ 01:35)  Lactate Dehydrogenase, Serum: 1341 U/L ( @ 02:13)    TELEMETRY: PVCs Interval History:    Clotted off CVVH last night but has adequate u/o without, so currently off of CVVH. Additionally, had a concomitant drop in Plts, so off of heparin and on argatroban while awaiting HIT panel results. With sedation lightened responds to voice.    Medications:  sodium chloride 0.9%. 1000milliLiter(s) IV Continuous <Continuous>  famotidine Injectable 20milliGRAM(s) IV Push daily  docusate sodium 100milliGRAM(s) Oral three times a day  insulin Infusion 3Unit(s)/Hr IV Continuous <Continuous>  dextrose 5%. 1000milliLiter(s) IV Continuous <Continuous>  dextrose Gel 1Dose(s) Oral once PRN  dextrose 50% Injectable 12.5Gram(s) IV Push once  dextrose 50% Injectable 25Gram(s) IV Push once  dextrose 50% Injectable 25Gram(s) IV Push once  glucagon  Injectable 1milliGRAM(s) IntraMuscular once PRN  vasopressin Infusion 0.067Unit(s)/Min IV Continuous <Continuous>  furosemide Infusion 20mG/Hr IV Continuous <Continuous>  chlorhexidine 0.12% Liquid 15milliLiter(s) Swish and Spit two times a day  freetext medication -  Infusion 8milliGRAM(s) IV Continuous <Continuous>  propofol Infusion 11.111MICROgram(s)/kG/Min IV Continuous <Continuous>  norepinephrine Infusion 0.071MICROgram(s)/kG/Min IV Continuous <Continuous>  amiodarone Infusion 0.5mG/Min IV Continuous <Continuous>  sodium chloride 0.9% 1000milliLiter(s) IV Continuous <Continuous>  argatroban Infusion 0.4MICROgram(s)/kG/Min IV Continuous <Continuous>  amiodarone    Tablet 400milliGRAM(s) Oral every 8 hours    Vitals:  T(C): 36.7, Max: 37.1 (06-14 @ 23:00)  HR: 88 (68 - 99)  BP: --  BP(mean): --  ABP: 92/81 (60/49 - 102/86)  ABP(mean): 86 (54 - 93)  RR: 12 (0 - 25)  SpO2: 100% (85% - 100%)  Wt(kg): --  CVP(cm H2O): --  CO: 4.2 (4.1 - 6.8)  CI: 2.3 (2.1 - 3.6)  PA: 45/19 (37/14 - 53/23)  PA(mean): 29 (23 - 34)  PCWP: --  SVR: 1312 (564 - 1312)  PVR: --    Daily     Daily Weight in k.8 (15 Kennedy 2017 00:00)        I&O's Summary  I & Os for 24h ending 15 Kennedy 2017 07:00  =============================================  IN: 3789.4 ml / OUT: 4102 ml / NET: -312.6 ml    I & Os for current day (as of 15 Kennedy 2017 11:27)  =============================================  IN: 381.6 ml / OUT: 760 ml / NET: -378.4 ml      Physical Exam:  Appearance: Intubated, sedated  HEENT: Lines in place  Cardiovascular: Quiet S1 S2, + LVAD hum, surgical dressing C/D/I  Respiratory: Clear to auscultation anteriorly  Gastrointestinal:  Soft, Non-tender	  Skin: No cyanosis	  Neurologic: Sedated  Extremities: No edema  Psychiatry: Sedated    LVAD Interrogation:  Pump Flow:4.4  Pump Speed:9200rpm  Pulse Index:5.8  Pump Power:5.2  VAD Events: No events  Driveline evaluation: Dressing C/D/I   Programming Changes: No changes made    Labs:                        7.9    21.7  )-----------( 22       ( 15 Kennedy 2017 06:02 )             25.8     06-15    131<L>  |  96  |  30<H>  ----------------------------<  136<H>  4.3   |  20<L>  |  2.47<H>    Ca    8.5      15 Kennedy 2017 06:02  Phos  4.1     06-14  Mg     2.3     06-14    TPro  5.7<L>  /  Alb  3.1<L>  /  TBili  5.3<H>  /  DBili  x   /  AST  105<H>  /  ALT  54<H>  /  AlkPhos  68  06-15    PT/INR - ( 15 Kennedy 2017 06:02 )   PT: 19.7 sec;   INR: 1.79 ratio         PTT - ( 15 Kennedy 2017 10:17 )  PTT:73.6 sec  CARDIAC MARKERS ( 2017 01:35 )  x     / x     / 321 U/L / x     / x            Oxygen Saturation, Mixed: 63 % (06-15 @ 10:07)  Oxygen Saturation, Mixed: 70 % (06-15 @ 05:57)  Oxygen Saturation, Mixed: 71 % (06-15 @ 02:06)  Oxygen Saturation, Mixed: 66 % ( @ 21:52)  Oxygen Saturation, Mixed: 65 % ( @ 15:56)  Oxygen Saturation, Mixed: 68 % ( @ 07:49)  Oxygen Saturation, Mixed: 68 % ( @ 20:07)  Oxygen Saturation, Mixed: 72 % ( @ 18:00)  Oxygen Saturation, Mixed: 75 % ( @ 16:19)  Oxygen Saturation, Mixed: 73 % ( @ 14:00)  Oxygen Saturation, Mixed: 70 % ( @ 12:09)  Oxygen Saturation, Mixed: 72 % ( @ 09:19)  Oxygen Saturation, Mixed: 70 % ( @ 08:08)  Oxygen Saturation, Mixed: 82 % ( @ 02:10)  Oxygen Saturation, Mixed: 88 % ( @ 14:24)    Lactate Dehydrogenase, Serum: 889 U/L (06-15 @ 02:06)  Lactate Dehydrogenase, Serum: 1098 U/L ( @ 01:35)  Lactate Dehydrogenase, Serum: 1341 U/L ( @ 02:13)    TELEMETRY: PVCs

## 2017-06-15 NOTE — PROGRESS NOTE ADULT - ASSESSMENT
68yo  man PMH NICM with BiVHFrEF s/p AICD, pAfib on AC with hx of VT, HTN, HLD presents for management of CHF/LVAD evaluation; Neuro consulted for episode of b/l UE twitching. Per nurse report, pt still with intermittent twitching but no epileptiform discharges on EEG, thus less suggestive of seizures at this time and possibly more myoclonus vs asterixis.   - can d/c EEG and d/c Keppra  - recommend CT head when stable  - continue with supportive care as per CTICU team

## 2017-06-15 NOTE — PROGRESS NOTE ADULT - PROBLEM SELECTOR PLAN 3
RPMs at 9200.   No VAD alarms or significant events  Continue aspirin 81 mg daily  On argatroban gtt.

## 2017-06-15 NOTE — PROGRESS NOTE ADULT - PROBLEM SELECTOR PLAN 2
Currrently off inotropes and CI at goal, > 2.4. Lactic acid improved. Currrently on low dose milrinone and CI at goal, > 2.4. Lactic acid normal.

## 2017-06-15 NOTE — PROGRESS NOTE ADULT - ATTENDING COMMENTS
NORMAN- initially preop cardiorenal +mild retention component (but did not rquire emily)  creatinine had improved to 1.4---s/p LVAD- creatinine has been rising s/p  v tach as well as BP fluctuations -likely ATN  Briefly on CVVHDF, now off  Making god urine with CVP close to 13  Continue with the lasix drip      case d/w dr Tam in detail

## 2017-06-15 NOTE — PROGRESS NOTE ADULT - ASSESSMENT
66 y/o M s/p LVAD HM2 implantation POD 3. Remains on multiple pressors and inotropes as well as 24h EEG monitoring and Keppra seizure prophylaxis. Remains intubated.   C/w current medications   Head CT to r/o acute intracranial process when stable as recommended by Neurology.   On CVVHDF -25cc/hr fluid removal as tolerated as per Nephrology.   HIT/CHERIE sent for clotting noted in dialysis catheter, Heparin gtt d/c'd and Argatroban gtt initiated.    Hemodynamic monitoring, monitor chest tube output   FSG/pain control  D/w attending and team at rounds

## 2017-06-15 NOTE — PROGRESS NOTE ADULT - SUBJECTIVE AND OBJECTIVE BOX
Operation LVAD HM2 implantation  POD 3   Narrative Patient seen and examined, intubated on vent.     Vital Signs Last 24 Hrs  T(C): 37.1, Max: 37.1 (06-14 @ 23:00)  T(F): 98.8, Max: 98.8 (06-14 @ 23:00)  HR: 89 (85 - 107)  BP: --  BP(mean): --  RR: 16 (1 - 27)  SpO2: 100% (94% - 100%)  I&O's Detail  I & Os for 24h ending 14 Jun 2017 07:00  =============================================  IN:    IV PiggyBack: 2050 ml    amiodarone Infusion: 432.9 ml    sodium chloride 0.9%.: 240 ml    amiodarone Infusion: 200 ml    furosemide Infusion: 200 ml    milrinone  Infusion: 180.6 ml    norepinephrine Infusion: 176 ml    EPINEPHrine Infusion: 144 ml    vasopressin Infusion: 144 ml    Enteral Tube Flush: 120 ml    propofol Infusion: 110 ml    amiodarone Infusion: 99.9 ml    freetext medication -  Infusion: 95 ml    heparin Infusion: 85 ml    norepinephrine Infusion: 72 ml    insulin Infusion: 64 ml    lidocaine   Infusion: 7.5 ml    dexmedetomidine Infusion: 3.4 ml    Total IN: 4424.3 ml  ---------------------------------------------  OUT:    Indwelling Catheter - Urethral: 1770 ml    Chest Tube: 275 ml    Chest Tube: 230 ml    Chest Tube: 100 ml    Nasoenteral Tube: 100 ml    Total OUT: 2475 ml  ---------------------------------------------  Total NET: 1949.3 ml    I & Os for current day (as of 15 Kennedy 2017 01:27)  =============================================  IN:    IV PiggyBack: 750 ml    sodium chloride 0.9%: 480 ml    amiodarone Infusion: 399.6 ml    furosemide Infusion: 180 ml    sodium chloride 0.9%.: 180 ml    Nepro with Carb Steady: 130 ml    propofol Infusion: 126 ml    norepinephrine Infusion: 105 ml    vasopressin Infusion: 104 ml    Enteral Tube Flush: 100 ml    amiodarone Infusion: 99.9 ml    heparin Infusion: 74 ml    milrinone  Infusion: 67.5 ml    freetext medication -  Infusion: 54 ml    milrinone  Infusion: 25.2 ml    insulin Infusion: 18.5 ml    argatroban Infusion: 3.6 ml    Total IN: 2897.3 ml  ---------------------------------------------  OUT:    Other: 1602 ml    Indwelling Catheter - Urethral: 1145 ml    Chest Tube: 240 ml    Chest Tube: 40 ml    Chest Tube: 10 ml    Total OUT: 3037 ml  ---------------------------------------------  Total NET: -139.7 ml      LABS:    MEDICATIONS  (STANDING):  sodium chloride 0.9%. 1000milliLiter(s) IV Continuous <Continuous>  famotidine Injectable 20milliGRAM(s) IV Push daily  docusate sodium 100milliGRAM(s) Oral three times a day  insulin Infusion 3Unit(s)/Hr IV Continuous <Continuous>  dextrose 5%. 1000milliLiter(s) IV Continuous <Continuous>  dextrose 50% Injectable 12.5Gram(s) IV Push once  dextrose 50% Injectable 25Gram(s) IV Push once  dextrose 50% Injectable 25Gram(s) IV Push once  milrinone Infusion 0.2MICROgram(s)/kG/Min IV Continuous <Continuous>  vasopressin Infusion 0.067Unit(s)/Min IV Continuous <Continuous>  levETIRAcetam  IVPB 500milliGRAM(s) IV Intermittent every 12 hours  aspirin  chewable 81milliGRAM(s) Oral daily  furosemide Infusion 20mG/Hr IV Continuous <Continuous>  chlorhexidine 0.12% Liquid 15milliLiter(s) Swish and Spit two times a day  freetext medication -  Infusion 8milliGRAM(s) IV Continuous <Continuous>  propofol Infusion 11.111MICROgram(s)/kG/Min IV Continuous <Continuous>  norepinephrine Infusion 0.071MICROgram(s)/kG/Min IV Continuous <Continuous>  ciprofloxacin   IVPB 400milliGRAM(s) IV Intermittent every 12 hours  ciprofloxacin   IVPB  IV Intermittent   vancomycin  IVPB 1000milliGRAM(s) IV Intermittent every 12 hours  amiodarone Infusion 1mG/Min IV Continuous <Continuous>  sodium chloride 0.9% 1000milliLiter(s) IV Continuous <Continuous>  argatroban Infusion 0.4MICROgram(s)/kG/Min IV Continuous <Continuous>    MEDICATIONS  (PRN):  dextrose Gel 1Dose(s) Oral once PRN Blood Glucose LESS THAN 70 milliGRAM(s)/deciLiter  glucagon  Injectable 1milliGRAM(s) IntraMuscular once PRN Glucose <70 milliGRAM(s)/deciLiter      General: sedated on vent, NAD   Chest: sternal dressing C/D/I  Heart: S1, S2, RRR  Lungs: CTA B/L, without W/R/R  Abdomen: Soft, ND, NT, positive BS  Extremeties: without edema B/L LE, positive DP pulses B/L     Does the patient have a history of CHF: yes  -If yes, systolic or diastolic: systolic, diastolic  -pre-operative EF: 15     Extubation within 24 hours: No    Does the patient have a history of kidney disease: Yes  -give CKD stage if applicable: 3b   -what is patient's baseline Creatinine: 1.7     Beta Blockers contraindicated for the first 24 hours due to vasopressor/inotropic medication: yes  -If on pressors, indication: hypotension    Did the patient receive transfusion of blood and/or products: yes  -If yes, indication: thrombocytopenia     DVT PPX: Argatroban gtt     Maxine-operative antibiotics discontinued within 48 hours of CTU admission: No  -name/date/time of discontinue: continues to be on Cipro/Vanco, will d/w CTS team regarding end date     RADIOLOGY & ADDITIONAL TESTS: EXAM:  CHEST-PORTABLE URGENT                       PROCEDURE DATE:  06/14/2017    Impression:  Endotracheal tube, [place]-Cassidy catheter, AICD device, LV assist   device, mediastinal drain, and left thoracotomy tube remain unchanged. A   tiger tube has been placed and its tip overlies the body of the stomach.   The previous enteric tube was removed.    No consolidation or pleural effusion is noted.    Impression: Tiger tube tip within the stomach.    Patient care discussed on morning interdisciplinary rounds with CTS team. Operation LVAD HM2 implantation  POD 3   Narrative Patient seen and examined, intubated on vent.     Vital Signs Last 24 Hrs  T(C): 37.1, Max: 37.1 (06-14 @ 23:00)  T(F): 98.8, Max: 98.8 (06-14 @ 23:00)  HR: 89 (85 - 107)  BP: --  BP(mean): --  RR: 16 (1 - 27)  SpO2: 100% (94% - 100%)  I&O's Detail  I & Os for 24h ending 14 Jun 2017 07:00  =============================================  IN:    IV PiggyBack: 2050 ml    amiodarone Infusion: 432.9 ml    sodium chloride 0.9%.: 240 ml    amiodarone Infusion: 200 ml    furosemide Infusion: 200 ml    milrinone  Infusion: 180.6 ml    norepinephrine Infusion: 176 ml    EPINEPHrine Infusion: 144 ml    vasopressin Infusion: 144 ml    Enteral Tube Flush: 120 ml    propofol Infusion: 110 ml    amiodarone Infusion: 99.9 ml    freetext medication -  Infusion: 95 ml    heparin Infusion: 85 ml    norepinephrine Infusion: 72 ml    insulin Infusion: 64 ml    lidocaine   Infusion: 7.5 ml    dexmedetomidine Infusion: 3.4 ml    Total IN: 4424.3 ml  ---------------------------------------------  OUT:    Indwelling Catheter - Urethral: 1770 ml    Chest Tube: 275 ml    Chest Tube: 230 ml    Chest Tube: 100 ml    Nasoenteral Tube: 100 ml    Total OUT: 2475 ml  ---------------------------------------------  Total NET: 1949.3 ml    I & Os for current day (as of 15 Kennedy 2017 01:27)  =============================================  IN:    IV PiggyBack: 750 ml    sodium chloride 0.9%: 480 ml    amiodarone Infusion: 399.6 ml    furosemide Infusion: 180 ml    sodium chloride 0.9%.: 180 ml    Nepro with Carb Steady: 130 ml    propofol Infusion: 126 ml    norepinephrine Infusion: 105 ml    vasopressin Infusion: 104 ml    Enteral Tube Flush: 100 ml    amiodarone Infusion: 99.9 ml    heparin Infusion: 74 ml    milrinone  Infusion: 67.5 ml    freetext medication -  Infusion: 54 ml    milrinone  Infusion: 25.2 ml    insulin Infusion: 18.5 ml    argatroban Infusion: 3.6 ml    Total IN: 2897.3 ml  ---------------------------------------------  OUT:    Other: 1602 ml    Indwelling Catheter - Urethral: 1145 ml    Chest Tube: 240 ml    Chest Tube: 40 ml    Chest Tube: 10 ml    Total OUT: 3037 ml  ---------------------------------------------  Total NET: -139.7 ml      LABS:                       8.2    23.0  )-----------( 20       ( 15 Kennedy 2017 02:42 )             25.8     06-15    130<L>  |  94<L>  |  28<H>  ----------------------------<  119<H>  4.7   |  18<L>  |  2.33<H>    Ca    8.6      15 Kennedy 2017 02:06  Phos  4.1     06-14  Mg     2.3     06-14    TPro  5.8<L>  /  Alb  3.0<L>  /  TBili  5.3<H>  /  DBili  x   /  AST  113<H>  /  ALT  57<H>  /  AlkPhos  65  06-15    PT/INR - ( 15 Kennedy 2017 02:06 )   PT: 14.9 sec;   INR: 1.36 ratio         PTT - ( 15 Kennedy 2017 02:06 )  PTT:44.9 sec  ABG - ( 15 Kennedy 2017 02:05 )  pH: 7.38  /  pCO2: 34    /  pO2: 171   / HCO3: 20    / Base Excess: -4.5  /  SaO2: 100       MEDICATIONS  (STANDING):  sodium chloride 0.9%. 1000milliLiter(s) IV Continuous <Continuous>  famotidine Injectable 20milliGRAM(s) IV Push daily  docusate sodium 100milliGRAM(s) Oral three times a day  insulin Infusion 3Unit(s)/Hr IV Continuous <Continuous>  dextrose 5%. 1000milliLiter(s) IV Continuous <Continuous>  dextrose 50% Injectable 12.5Gram(s) IV Push once  dextrose 50% Injectable 25Gram(s) IV Push once  dextrose 50% Injectable 25Gram(s) IV Push once  milrinone Infusion 0.2MICROgram(s)/kG/Min IV Continuous <Continuous>  vasopressin Infusion 0.067Unit(s)/Min IV Continuous <Continuous>  levETIRAcetam  IVPB 500milliGRAM(s) IV Intermittent every 12 hours  aspirin  chewable 81milliGRAM(s) Oral daily  furosemide Infusion 20mG/Hr IV Continuous <Continuous>  chlorhexidine 0.12% Liquid 15milliLiter(s) Swish and Spit two times a day  freetext medication -  Infusion 8milliGRAM(s) IV Continuous <Continuous>  propofol Infusion 11.111MICROgram(s)/kG/Min IV Continuous <Continuous>  norepinephrine Infusion 0.071MICROgram(s)/kG/Min IV Continuous <Continuous>  ciprofloxacin   IVPB 400milliGRAM(s) IV Intermittent every 12 hours  ciprofloxacin   IVPB  IV Intermittent   vancomycin  IVPB 1000milliGRAM(s) IV Intermittent every 12 hours  amiodarone Infusion 1mG/Min IV Continuous <Continuous>  sodium chloride 0.9% 1000milliLiter(s) IV Continuous <Continuous>  argatroban Infusion 0.4MICROgram(s)/kG/Min IV Continuous <Continuous>    MEDICATIONS  (PRN):  dextrose Gel 1Dose(s) Oral once PRN Blood Glucose LESS THAN 70 milliGRAM(s)/deciLiter  glucagon  Injectable 1milliGRAM(s) IntraMuscular once PRN Glucose <70 milliGRAM(s)/deciLiter      General: sedated on vent, NAD   Chest: sternal dressing C/D/I  Heart: S1, S2, RRR  Lungs: CTA B/L, without W/R/R  Abdomen: Soft, ND, NT, positive BS  Extremeties: without edema B/L LE, positive DP pulses B/L     Does the patient have a history of CHF: yes  -If yes, systolic or diastolic: systolic, diastolic  -pre-operative EF: 15     Extubation within 24 hours: No    Does the patient have a history of kidney disease: Yes  -give CKD stage if applicable: 3b   -what is patient's baseline Creatinine: 1.7     Beta Blockers contraindicated for the first 24 hours due to vasopressor/inotropic medication: yes  -If on pressors, indication: hypotension    Did the patient receive transfusion of blood and/or products: yes  -If yes, indication: thrombocytopenia     DVT PPX: Argatroban gtt     Maxine-operative antibiotics discontinued within 48 hours of CTU admission: No  -name/date/time of discontinue: continues to be on Cipro/Vanco, will d/w CTS team regarding end date     RADIOLOGY & ADDITIONAL TESTS: EXAM:  CHEST-PORTABLE URGENT                       PROCEDURE DATE:  06/14/2017    Impression:  Endotracheal tube, [place]-Cassidy catheter, AICD device, LV assist   device, mediastinal drain, and left thoracotomy tube remain unchanged. A   tiger tube has been placed and its tip overlies the body of the stomach.   The previous enteric tube was removed.    No consolidation or pleural effusion is noted.    Impression: Tiger tube tip within the stomach.    Patient care discussed on morning interdisciplinary rounds with CTS team.

## 2017-06-15 NOTE — PROGRESS NOTE ADULT - SUBJECTIVE AND OBJECTIVE BOX
[facility] DIVISION OF KIDNEY DISEASES AND HYPERTENSION -- FOLLOW UP NOTE  --------------------------------------------------------------------------------  Chief Complaint: Heart failure      24hour events/Subjective: pt seen and examined. remains intubated and requiring pressor support.         PAST HISTORY  --------------------------------------------------------------------------------  No significant changes to PMH, PSH, FHx, SHx, unless otherwise noted    ALLERGIES & MEDICATIONS  --------------------------------------------------------------------------------  Allergies    No Known Allergies    Intolerances      Standing Inpatient Medications  sodium chloride 0.9%. 1000milliLiter(s) IV Continuous <Continuous>  famotidine Injectable 20milliGRAM(s) IV Push daily  docusate sodium 100milliGRAM(s) Oral three times a day  insulin Infusion 3Unit(s)/Hr IV Continuous <Continuous>  dextrose 5%. 1000milliLiter(s) IV Continuous <Continuous>  dextrose 50% Injectable 12.5Gram(s) IV Push once  dextrose 50% Injectable 25Gram(s) IV Push once  dextrose 50% Injectable 25Gram(s) IV Push once  vasopressin Infusion 0.067Unit(s)/Min IV Continuous <Continuous>  furosemide Infusion 20mG/Hr IV Continuous <Continuous>  chlorhexidine 0.12% Liquid 15milliLiter(s) Swish and Spit two times a day  freetext medication -  Infusion 8milliGRAM(s) IV Continuous <Continuous>  propofol Infusion 11.111MICROgram(s)/kG/Min IV Continuous <Continuous>  norepinephrine Infusion 0.071MICROgram(s)/kG/Min IV Continuous <Continuous>  amiodarone Infusion 0.5mG/Min IV Continuous <Continuous>  sodium chloride 0.9% 1000milliLiter(s) IV Continuous <Continuous>  argatroban Infusion 0.4MICROgram(s)/kG/Min IV Continuous <Continuous>  amiodarone    Tablet 400milliGRAM(s) Oral every 8 hours    PRN Inpatient Medications  dextrose Gel 1Dose(s) Oral once PRN  glucagon  Injectable 1milliGRAM(s) IntraMuscular once PRN      REVIEW OF SYSTEMS  --------------------------------------------------------------------------------  As above         VITALS/PHYSICAL EXAM  --------------------------------------------------------------------------------  T(C): 36.7, Max: 37.1 (06-14 @ 23:00)  HR: 96 (68 - 99)  BP: --  RR: 16 (0 - 25)  SpO2: 100% (85% - 100%)  Wt(kg): --      I & Os for 24h ending 06-15-17 @ 07:00  =============================================  IN: 3789.4 ml / OUT: 4102 ml / NET: -312.6 ml    I & Os for current day (as of 06-15-17 @ 10:04)  =============================================  IN: 292.9 ml / OUT: 450 ml / NET: -157.1 ml    Physical Exam:  	Gen: NAD  	HEENT: +ETT  	Pulm: Mechanical breath sounds   	CV: S1S2  	Abd: Soft, +BS  	Ext: No LE edema B/L                      Neuro: Sedated   	Skin: Warm and Dry   	Other : +diaz    LABS/STUDIES  --------------------------------------------------------------------------------              7.9    21.7  >-----------<  22       [06-15-17 @ 06:02]              25.8     131  |  96  |  30  ----------------------------<  136      [06-15-17 @ 06:02]  4.3   |  20  |  2.47        Ca     8.5     [06-15-17 @ 06:02]      Mg     2.3     [06-14-17 @ 23:41]      Phos  4.1     [06-14-17 @ 23:41]    TPro  5.7  /  Alb  3.1  /  TBili  5.3  /  DBili  x   /  AST  105  /  ALT  54  /  AlkPhos  68  [06-15-17 @ 06:02]    PT/INR: PT 19.7 , INR 1.79       [06-15-17 @ 06:02]  PTT: 62.8       [06-15-17 @ 06:02]          [06-14-17 @ 01:35]        [06-15-17 @ 02:06]    Creatinine Trend:  SCr 2.47 [06-15 @ 06:02]  SCr 2.33 [06-15 @ 02:06]  SCr 2.23 [06-14 @ 23:41]  SCr 2.47 [06-14 @ 01:35]  SCr 2.20 [06-13 @ 16:22]    Urinalysis - [06-03-17 @ 10:46]      Color Mary / Appearance Clear / SG 1.012 / pH 5.0      Gluc Negative / Ketone Negative  / Bili Negative / Urobili 1.0       Blood Negative / Protein Negative / Leuk Est Negative / Nitrite Negative      RBC 2 / WBC 1 / Hyaline 6 / Gran  / Sq Epi  / Non Sq Epi 0 / Bacteria Negative      Iron 20, TIBC 352, %sat 6      [06-08-17 @ 07:14]  Ferritin 121.0      [06-09-17 @ 19:08]  HbA1c 5.5      [06-07-17 @ 06:14]  TSH 2.33      [06-14-17 @ 14:51]  Lipid: chol 62, TG 33, HDL 17, LDL 38      [06-07-17 @ 06:14] [facility] DIVISION OF KIDNEY DISEASES AND HYPERTENSION -- FOLLOW UP NOTE  --------------------------------------------------------------------------------  Chief Complaint: Heart failure      24hour events/Subjective: pt seen and examined. remains intubated and requiring pressor support.   CVVHD machine clotted despite citrate anticoagulation. Now off  No longer on milrinone and vasopressin      PAST HISTORY  --------------------------------------------------------------------------------  No significant changes to PMH, PSH, FHx, SHx, unless otherwise noted    ALLERGIES & MEDICATIONS  --------------------------------------------------------------------------------  Allergies    No Known Allergies    Intolerances      Standing Inpatient Medications  sodium chloride 0.9%. 1000milliLiter(s) IV Continuous <Continuous>  famotidine Injectable 20milliGRAM(s) IV Push daily  docusate sodium 100milliGRAM(s) Oral three times a day  insulin Infusion 3Unit(s)/Hr IV Continuous <Continuous>  dextrose 5%. 1000milliLiter(s) IV Continuous <Continuous>  dextrose 50% Injectable 12.5Gram(s) IV Push once  dextrose 50% Injectable 25Gram(s) IV Push once  dextrose 50% Injectable 25Gram(s) IV Push once  vasopressin Infusion 0.067Unit(s)/Min IV Continuous <Continuous>  furosemide Infusion 20mG/Hr IV Continuous <Continuous>  chlorhexidine 0.12% Liquid 15milliLiter(s) Swish and Spit two times a day  freetext medication -  Infusion 8milliGRAM(s) IV Continuous <Continuous>  propofol Infusion 11.111MICROgram(s)/kG/Min IV Continuous <Continuous>  norepinephrine Infusion 0.071MICROgram(s)/kG/Min IV Continuous <Continuous>  amiodarone Infusion 0.5mG/Min IV Continuous <Continuous>  sodium chloride 0.9% 1000milliLiter(s) IV Continuous <Continuous>  argatroban Infusion 0.4MICROgram(s)/kG/Min IV Continuous <Continuous>  amiodarone    Tablet 400milliGRAM(s) Oral every 8 hours    PRN Inpatient Medications  dextrose Gel 1Dose(s) Oral once PRN  glucagon  Injectable 1milliGRAM(s) IntraMuscular once PRN      REVIEW OF SYSTEMS  --------------------------------------------------------------------------------  As above         VITALS/PHYSICAL EXAM  --------------------------------------------------------------------------------  T(C): 36.7, Max: 37.1 (06-14 @ 23:00)  HR: 96 (68 - 99)  BP: --  RR: 16 (0 - 25)  SpO2: 100% (85% - 100%)  Wt(kg): --      I & Os for 24h ending 06-15-17 @ 07:00  =============================================  IN: 3789.4 ml / OUT: 4102 ml / NET: -312.6 ml    I & Os for current day (as of 06-15-17 @ 10:04)  =============================================  IN: 292.9 ml / OUT: 450 ml / NET: -157.1 ml    Physical Exam:  	Gen: NAD  	HEENT: +ETT  	Pulm: Mechanical breath sounds   	CV: S1S2  	Abd: Soft, +BS  	Ext: No LE edema B/L                      Neuro: Sedated   	Skin: Warm and Dry   	Other : +diaz    LABS/STUDIES  --------------------------------------------------------------------------------              7.9    21.7  >-----------<  22       [06-15-17 @ 06:02]              25.8     131  |  96  |  30  ----------------------------<  136      [06-15-17 @ 06:02]  4.3   |  20  |  2.47        Ca     8.5     [06-15-17 @ 06:02]      Mg     2.3     [06-14-17 @ 23:41]      Phos  4.1     [06-14-17 @ 23:41]    TPro  5.7  /  Alb  3.1  /  TBili  5.3  /  DBili  x   /  AST  105  /  ALT  54  /  AlkPhos  68  [06-15-17 @ 06:02]    PT/INR: PT 19.7 , INR 1.79       [06-15-17 @ 06:02]  PTT: 62.8       [06-15-17 @ 06:02]          [06-14-17 @ 01:35]        [06-15-17 @ 02:06]    Creatinine Trend:  SCr 2.47 [06-15 @ 06:02]  SCr 2.33 [06-15 @ 02:06]  SCr 2.23 [06-14 @ 23:41]  SCr 2.47 [06-14 @ 01:35]  SCr 2.20 [06-13 @ 16:22]    Urinalysis - [06-03-17 @ 10:46]      Color Mary / Appearance Clear / SG 1.012 / pH 5.0      Gluc Negative / Ketone Negative  / Bili Negative / Urobili 1.0       Blood Negative / Protein Negative / Leuk Est Negative / Nitrite Negative      RBC 2 / WBC 1 / Hyaline 6 / Gran  / Sq Epi  / Non Sq Epi 0 / Bacteria Negative      Iron 20, TIBC 352, %sat 6      [06-08-17 @ 07:14]  Ferritin 121.0      [06-09-17 @ 19:08]  HbA1c 5.5      [06-07-17 @ 06:14]  TSH 2.33      [06-14-17 @ 14:51]  Lipid: chol 62, TG 33, HDL 17, LDL 38      [06-07-17 @ 06:14]

## 2017-06-15 NOTE — PROGRESS NOTE ADULT - SUBJECTIVE AND OBJECTIVE BOX
CRITICAL CARE ATTENDING - CTICU    MEDICATIONS  (STANDING):  sodium chloride 0.9%. 1000milliLiter(s) IV Continuous <Continuous>  famotidine Injectable 20milliGRAM(s) IV Push daily  docusate sodium 100milliGRAM(s) Oral three times a day  insulin Infusion 3Unit(s)/Hr IV Continuous <Continuous>  dextrose 5%. 1000milliLiter(s) IV Continuous <Continuous>  dextrose 50% Injectable 12.5Gram(s) IV Push once  dextrose 50% Injectable 25Gram(s) IV Push once  dextrose 50% Injectable 25Gram(s) IV Push once  vasopressin Infusion 0.067Unit(s)/Min IV Continuous <Continuous>  furosemide Infusion 20mG/Hr IV Continuous <Continuous>  chlorhexidine 0.12% Liquid 15milliLiter(s) Swish and Spit two times a day  freetext medication -  Infusion 8milliGRAM(s) IV Continuous <Continuous>  propofol Infusion 11.111MICROgram(s)/kG/Min IV Continuous <Continuous>  norepinephrine Infusion 0.071MICROgram(s)/kG/Min IV Continuous <Continuous>  amiodarone Infusion 0.5mG/Min IV Continuous <Continuous>  sodium chloride 0.9% 1000milliLiter(s) IV Continuous <Continuous>  argatroban Infusion 0.4MICROgram(s)/kG/Min IV Continuous <Continuous>  amiodarone    Tablet 400milliGRAM(s) Oral every 8 hours                                    7.9    21.7  )-----------( 22       ( 15 Kennedy 2017 06:02 )             25.8       06-15    131<L>  |  96  |  30<H>  ----------------------------<  136<H>  4.3   |  20<L>  |  2.47<H>    Ca    8.5      15 Kennedy 2017 06:02  Phos  4.1     06-14  Mg     2.3     06-14    TPro  5.7<L>  /  Alb  3.1<L>  /  TBili  5.3<H>  /  DBili  x   /  AST  105<H>  /  ALT  54<H>  /  AlkPhos  68  06-15      PT/INR - ( 15 Kennedy 2017 06:02 )   PT: 19.7 sec;   INR: 1.79 ratio         PTT - ( 15 Kennedy 2017 10:17 )  PTT:73.6 sec    Mode: AC/ CMV (Assist Control/ Continuous Mandatory Ventilation)  RR (machine): 10  TV (machine): 600  FiO2: 50  PEEP: 5  ITime: 1  MAP: 10  PIP: 23      Daily     Daily Weight in k.8 (15 Kennedy 2017 00:00)    I & Os for 24h ending 06-15 @ 07:00  =============================================  IN: 3789.4 ml / OUT: 4102 ml / NET: -312.6 ml    I & Os for current day (as of 06-15 @ 11:19)  =============================================  IN: 381.6 ml / OUT: 760 ml / NET: -378.4 ml      Critically Ill patient  : [ ] preoperative ,   [x] post operative    Requires :  [x ] Arterial Line   [x ] Central Line  [x ] PA catheter  [ ] IABP  [ ] ECMO  [x ] LVAD  [x ] Ventilator  [x ] pacemaker [ ] Impella.    Bedside evaluation , monitoring , treatment of hemodynamics , fluids , IVP/ IVCD meds.        Diagnosis:     POD 3 LVAD    Cardiogenic Shock     CHF- acute [x ]   chronic [ ]    systolic [x ]   diatolic [ ]          - Echo- EF -10             [x] RV dysfunction          - Cxr-cardiomegally, edema          - Clinical- [ x ]inotropes   [x ]pressors   [x ]diuresis   [ ]IABP   [ ]ECMO   [x ]LVAD   [x ]Respiratory Failure    Hemodynamic lability,instability. Requires IVCD [x ] vasopressors [x ] inotropes  [ ] vasodilator  [ ]IVSS fluid  to maintain MAP, perfusion, C.I.     LVAD circuit    fluid overload     Renal Failure     Requires chest PT, pulmonary toilet, ambu bagging, suctioning to maintain SaO2,  patent airway and treat atelectasis.     Ogema Cassidy catheter interpretation and therapeutic management of unstable hemodynamics     respiratory failure     Ventilator Management:  [x]AC-rest    [x ]CPAP-PS Wean    [ ]Trach Collar     [ ]Extubate    [ ] T-Piece  [ ]peep>5     Thrombocytopenia - r/o DIC, r/o HIT, r/o medication induced    EEG not c/w seizure activity, will d/c Keppra (thrombocytopenia)    s/p PPM / AICD    CVVH-D on hold at present time - increased urine output and response to lasix drip.                      -                 Discussed with CT surgeon, Physician's Assistant - Nurse Practitioner- Critical care medicine team.   Dicussed at  AM / PM rounds.   Chart, labs , films reviewed.    Total Time: 180 min

## 2017-06-15 NOTE — CHART NOTE - NSCHARTNOTEFT_GEN_A_CORE
Pt with CHF, now s/p LVAD (heartmate II) POD#3; renal failure- was on CVVHD, now off as pt making urine; respiratory failure- remains intubated; fluid overload; diffuse multifocal cerebral dysfunction secondary to sedation vs multifocal cerebral intracranial lesion, possible plan for CT head to rule out acute intracranial process as per chart.     Source: Patient [ ]    Family [ ]     other [ x ] Pt d/w team during CTU rounds    Diet : Nepro at 10ml/hr to provide 432kcal and 19gm prot.     Dosing Weight:75kg  Current Weight: 73.8kg  % Weight Change: 1.6% wt loss. However, suspect weight fluctuations due to fluid shifts. Pt with 1+generalized and 2+bilateral arm/foot edema.     Pertinent Medications: MEDICATIONS  (STANDING):  sodium chloride 0.9%. 1000milliLiter(s) IV Continuous <Continuous>  famotidine Injectable 20milliGRAM(s) IV Push daily  docusate sodium 100milliGRAM(s) Oral three times a day  insulin Infusion 3Unit(s)/Hr IV Continuous <Continuous>  dextrose 5%. 1000milliLiter(s) IV Continuous <Continuous>  dextrose 50% Injectable 12.5Gram(s) IV Push once  dextrose 50% Injectable 25Gram(s) IV Push once  dextrose 50% Injectable 25Gram(s) IV Push once  vasopressin Infusion 0.067Unit(s)/Min IV Continuous <Continuous>  levETIRAcetam  IVPB 500milliGRAM(s) IV Intermittent every 12 hours  furosemide Infusion 20mG/Hr IV Continuous <Continuous>  chlorhexidine 0.12% Liquid 15milliLiter(s) Swish and Spit two times a day  freetext medication -  Infusion 8milliGRAM(s) IV Continuous <Continuous>  propofol Infusion 11.111MICROgram(s)/kG/Min IV Continuous <Continuous>  norepinephrine Infusion 0.071MICROgram(s)/kG/Min IV Continuous <Continuous>  amiodarone Infusion 0.5mG/Min IV Continuous <Continuous>  sodium chloride 0.9% 1000milliLiter(s) IV Continuous <Continuous>  argatroban Infusion 0.4MICROgram(s)/kG/Min IV Continuous <Continuous>    MEDICATIONS  (PRN):  dextrose Gel 1Dose(s) Oral once PRN Blood Glucose LESS THAN 70 milliGRAM(s)/deciLiter  glucagon  Injectable 1milliGRAM(s) IntraMuscular once PRN Glucose <70 milliGRAM(s)/deciLiter    Pertinent Labs:    Comprehensive Metabolic Panel (06.15.17 @ 06:02)    Sodium, Serum: 131: TEST REPEATED. mmol/L    Potassium, Serum: 4.3 mmol/L    Chloride, Serum: 96 mmol/L    Carbon Dioxide, Serum: 20 mmol/L    Anion Gap, Serum: 15 mmol/L    Blood Urea Nitrogen, Serum: 30 mg/dL    Creatinine, Serum: 2.47 mg/dL    Glucose, Serum: 136 mg/dL    Calcium, Total Serum: 8.5 mg/dL    Protein Total, Serum: 5.7 g/dL    Albumin, Serum: 3.1 g/dL    Bilirubin Total, Serum: 5.3 mg/dL    Alkaline Phosphatase, Serum: 68 U/L    Aspartate Aminotransferase (AST/SGOT): 105 U/L    Alanine Aminotransferase (ALT/SGPT): 54 U/L RC  Hemoglobin: 7.9 g/dL (06.15.17 @ 06:02)  Hematocrit: 25.8 % (06.15.17 @ 06:02)      Skin: No pressure ulcers noted    Estimated Needs:   [ x ] no change since previous assessment  [ ] recalculated:       Previous Nutrition Diagnosis:    Increased Nutrient Needs    Malnutrition          Nutrition Diagnosis is [ x ] ongoing  [ ] resolved [ ] not applicable          New Nutrition Diagnosis: [ x ] not applicable    [ ] Inadequate Protein Energy Intake [ ]Inadequate Oral Intake [ ] Excessive Energy Intake     [ ] Underweight [ ] Increased Nutrient Needs [ ] Overweight/Obesity     [ ] Altered GI Function [ ] Unintended Weight Loss [ ] Food & Nutrition Related Knowledge Deficit[ ] Limited Adherence to nutrition related recommendations [ ] Malnutrition  [ ] other: Free text       Related to:      As evidenced by:      Interventions:     Recommend    [ ] Change Diet To:    [ ] Nutrition Supplement    [ x ] Nutrition Support- Recommend increase goal of Nepro to 50ml/hr, advance as tolerated. At goal rate Nepro to provide 2160kcal and 97gm prot (29kcal/kg and 1.3gm prot/kg per dosing wt 75kg).     [ x ] Other: Trend renal indices, electrolytes, glucose, LFTs; Recommendations d/w team during CTU rounds.       Monitoring and Evaluation:     [ ] PO intake [ x ] Tolerance to diet prescription [ x ] weights [ x ] follow up per protocol    [ ] other: Pt with CHF, now s/p LVAD (heartmate II) POD#3; renal failure- was on CVVHD, now off as pt making urine; respiratory failure- remains intubated; fluid overload; diffuse multifocal cerebral dysfunction secondary to sedation vs multifocal cerebral intracranial lesion, possible plan for CT head to rule out acute intracranial process as per chart. Verbal nutrition consult received for enteral nutrition support recommendations.     Source: Patient [ ]    Family [ ]     other [ x ] Pt d/w team during CTU rounds    Diet : Nepro at 10ml/hr to provide 432kcal and 19gm prot.     Dosing Weight:75kg  Current Weight: 73.8kg  % Weight Change: 1.6% wt loss. However, suspect weight fluctuations due to fluid shifts. Pt with 1+generalized and 2+bilateral arm/foot edema.     Pertinent Medications: MEDICATIONS  (STANDING):  sodium chloride 0.9%. 1000milliLiter(s) IV Continuous <Continuous>  famotidine Injectable 20milliGRAM(s) IV Push daily  docusate sodium 100milliGRAM(s) Oral three times a day  insulin Infusion 3Unit(s)/Hr IV Continuous <Continuous>  dextrose 5%. 1000milliLiter(s) IV Continuous <Continuous>  dextrose 50% Injectable 12.5Gram(s) IV Push once  dextrose 50% Injectable 25Gram(s) IV Push once  dextrose 50% Injectable 25Gram(s) IV Push once  vasopressin Infusion 0.067Unit(s)/Min IV Continuous <Continuous>  levETIRAcetam  IVPB 500milliGRAM(s) IV Intermittent every 12 hours  furosemide Infusion 20mG/Hr IV Continuous <Continuous>  chlorhexidine 0.12% Liquid 15milliLiter(s) Swish and Spit two times a day  freetext medication -  Infusion 8milliGRAM(s) IV Continuous <Continuous>  propofol Infusion 11.111MICROgram(s)/kG/Min IV Continuous <Continuous>  norepinephrine Infusion 0.071MICROgram(s)/kG/Min IV Continuous <Continuous>  amiodarone Infusion 0.5mG/Min IV Continuous <Continuous>  sodium chloride 0.9% 1000milliLiter(s) IV Continuous <Continuous>  argatroban Infusion 0.4MICROgram(s)/kG/Min IV Continuous <Continuous>    MEDICATIONS  (PRN):  dextrose Gel 1Dose(s) Oral once PRN Blood Glucose LESS THAN 70 milliGRAM(s)/deciLiter  glucagon  Injectable 1milliGRAM(s) IntraMuscular once PRN Glucose <70 milliGRAM(s)/deciLiter    Pertinent Labs:    Comprehensive Metabolic Panel (06.15.17 @ 06:02)    Sodium, Serum: 131: TEST REPEATED. mmol/L    Potassium, Serum: 4.3 mmol/L    Chloride, Serum: 96 mmol/L    Carbon Dioxide, Serum: 20 mmol/L    Anion Gap, Serum: 15 mmol/L    Blood Urea Nitrogen, Serum: 30 mg/dL    Creatinine, Serum: 2.47 mg/dL    Glucose, Serum: 136 mg/dL    Calcium, Total Serum: 8.5 mg/dL    Protein Total, Serum: 5.7 g/dL    Albumin, Serum: 3.1 g/dL    Bilirubin Total, Serum: 5.3 mg/dL    Alkaline Phosphatase, Serum: 68 U/L    Aspartate Aminotransferase (AST/SGOT): 105 U/L    Alanine Aminotransferase (ALT/SGPT): 54 U/L RC  Hemoglobin: 7.9 g/dL (06.15.17 @ 06:02)  Hematocrit: 25.8 % (06.15.17 @ 06:02)      Skin: No pressure ulcers noted    Estimated Needs:   [ x ] no change since previous assessment  [ ] recalculated:       Previous Nutrition Diagnosis:    Increased Nutrient Needs    Malnutrition          Nutrition Diagnosis is [ x ] ongoing  [ ] resolved [ ] not applicable          New Nutrition Diagnosis: [ x ] not applicable    [ ] Inadequate Protein Energy Intake [ ]Inadequate Oral Intake [ ] Excessive Energy Intake     [ ] Underweight [ ] Increased Nutrient Needs [ ] Overweight/Obesity     [ ] Altered GI Function [ ] Unintended Weight Loss [ ] Food & Nutrition Related Knowledge Deficit[ ] Limited Adherence to nutrition related recommendations [ ] Malnutrition  [ ] other: Free text       Related to:      As evidenced by:      Interventions:     Recommend    [ ] Change Diet To:    [ ] Nutrition Supplement    [ x ] Nutrition Support- Recommend increase goal of Nepro to 50ml/hr, advance as tolerated. At goal rate Nepro to provide 2160kcal and 97gm prot (29kcal/kg and 1.3gm prot/kg per dosing wt 75kg).     [ x ] Other: Trend renal indices, electrolytes, glucose, LFTs; Recommendations d/w team during CTU rounds.       Monitoring and Evaluation:     [ ] PO intake [ x ] Tolerance to diet prescription [ x ] weights [ x ] follow up per protocol    [ ] other:

## 2017-06-16 LAB
ALBUMIN SERPL ELPH-MCNC: 3 G/DL — LOW (ref 3.3–5)
ALBUMIN SERPL ELPH-MCNC: 3.1 G/DL — LOW (ref 3.3–5)
ALP SERPL-CCNC: 89 U/L — SIGNIFICANT CHANGE UP (ref 40–120)
ALP SERPL-CCNC: 90 U/L — SIGNIFICANT CHANGE UP (ref 40–120)
ALT FLD-CCNC: 52 U/L RC — HIGH (ref 10–45)
ALT FLD-CCNC: 53 U/L RC — HIGH (ref 10–45)
ANION GAP SERPL CALC-SCNC: 14 MMOL/L — SIGNIFICANT CHANGE UP (ref 5–17)
ANION GAP SERPL CALC-SCNC: 15 MMOL/L — SIGNIFICANT CHANGE UP (ref 5–17)
APPEARANCE UR: CLEAR — SIGNIFICANT CHANGE UP
APTT BLD: 58.6 SEC — HIGH (ref 27.5–37.4)
APTT BLD: 63.9 SEC — HIGH (ref 27.5–37.4)
APTT BLD: 64.5 SEC — HIGH (ref 27.5–37.4)
APTT BLD: 69 SEC — HIGH (ref 27.5–37.4)
AST SERPL-CCNC: 101 U/L — HIGH (ref 10–40)
AST SERPL-CCNC: 84 U/L — HIGH (ref 10–40)
BASE EXCESS BLDMV CALC-SCNC: -0.7 MMOL/L — SIGNIFICANT CHANGE UP (ref -3–3)
BASE EXCESS BLDMV CALC-SCNC: -2 MMOL/L — SIGNIFICANT CHANGE UP (ref -3–3)
BASE EXCESS BLDMV CALC-SCNC: -2.1 MMOL/L — SIGNIFICANT CHANGE UP (ref -3–3)
BASE EXCESS BLDMV CALC-SCNC: -2.3 MMOL/L — SIGNIFICANT CHANGE UP (ref -3–3)
BILIRUB SERPL-MCNC: 6.3 MG/DL — HIGH (ref 0.2–1.2)
BILIRUB SERPL-MCNC: 6.6 MG/DL — HIGH (ref 0.2–1.2)
BILIRUB UR-MCNC: NEGATIVE — SIGNIFICANT CHANGE UP
BLD GP AB SCN SERPL QL: NEGATIVE — SIGNIFICANT CHANGE UP
BUN SERPL-MCNC: 33 MG/DL — HIGH (ref 7–23)
BUN SERPL-MCNC: 35 MG/DL — HIGH (ref 7–23)
CALCIUM SERPL-MCNC: 8.2 MG/DL — LOW (ref 8.4–10.5)
CALCIUM SERPL-MCNC: 8.3 MG/DL — LOW (ref 8.4–10.5)
CHLORIDE SERPL-SCNC: 93 MMOL/L — LOW (ref 96–108)
CHLORIDE SERPL-SCNC: 96 MMOL/L — SIGNIFICANT CHANGE UP (ref 96–108)
CO2 BLDMV-SCNC: 24 MMOL/L — SIGNIFICANT CHANGE UP (ref 21–29)
CO2 BLDMV-SCNC: 25 MMOL/L — SIGNIFICANT CHANGE UP (ref 21–29)
CO2 SERPL-SCNC: 19 MMOL/L — LOW (ref 22–31)
CO2 SERPL-SCNC: 21 MMOL/L — LOW (ref 22–31)
COLOR SPEC: YELLOW — SIGNIFICANT CHANGE UP
CREAT SERPL-MCNC: 2.63 MG/DL — HIGH (ref 0.5–1.3)
CREAT SERPL-MCNC: 2.69 MG/DL — HIGH (ref 0.5–1.3)
D DIMER BLD IA.RAPID-MCNC: 2241 NG/ML DDU — HIGH
DIFF PNL FLD: ABNORMAL
EPI CELLS # UR: SIGNIFICANT CHANGE UP /HPF
FIBRINOGEN PPP-MCNC: 322 MG/DL — SIGNIFICANT CHANGE UP (ref 310–510)
FIBRINOGEN PPP-MCNC: 324 MG/DL — SIGNIFICANT CHANGE UP (ref 310–510)
FSP PPP-MCNC: >=5 <20
GAS PNL BLDA: SIGNIFICANT CHANGE UP
GAS PNL BLDMV: SIGNIFICANT CHANGE UP
GLUCOSE SERPL-MCNC: 112 MG/DL — HIGH (ref 70–99)
GLUCOSE SERPL-MCNC: 119 MG/DL — HIGH (ref 70–99)
GLUCOSE UR QL: NEGATIVE — SIGNIFICANT CHANGE UP
GRAM STN FLD: SIGNIFICANT CHANGE UP
HAPTOGLOB SERPL-MCNC: 79 MG/DL — SIGNIFICANT CHANGE UP (ref 34–200)
HCO3 BLDMV-SCNC: 23 MMOL/L — SIGNIFICANT CHANGE UP (ref 20–28)
HCO3 BLDMV-SCNC: 24 MMOL/L — SIGNIFICANT CHANGE UP (ref 20–28)
HCT VFR BLD CALC: 24 % — LOW (ref 39–50)
HCT VFR BLD CALC: 25.3 % — LOW (ref 39–50)
HCT VFR BLD CALC: 26.9 % — LOW (ref 39–50)
HGB BLD-MCNC: 7.3 G/DL — LOW (ref 13–17)
HGB BLD-MCNC: 8.1 G/DL — LOW (ref 13–17)
HGB BLD-MCNC: 8.6 G/DL — LOW (ref 13–17)
HOROWITZ INDEX BLDMV+IHG-RTO: 50 — SIGNIFICANT CHANGE UP
HYALINE CASTS # UR AUTO: ABNORMAL
INR BLD: 1.96 RATIO — HIGH (ref 0.88–1.16)
KETONES UR-MCNC: NEGATIVE — SIGNIFICANT CHANGE UP
LDH SERPL L TO P-CCNC: 814 U/L — HIGH (ref 50–242)
LEUKOCYTE ESTERASE UR-ACNC: NEGATIVE — SIGNIFICANT CHANGE UP
LIDOCAIN SERPL-MCNC: 4.4 MCG/ML — SIGNIFICANT CHANGE UP (ref 1.5–5)
MCHC RBC-ENTMCNC: 27.3 PG — SIGNIFICANT CHANGE UP (ref 27–34)
MCHC RBC-ENTMCNC: 28.4 PG — SIGNIFICANT CHANGE UP (ref 27–34)
MCHC RBC-ENTMCNC: 29.1 PG — SIGNIFICANT CHANGE UP (ref 27–34)
MCHC RBC-ENTMCNC: 30.3 GM/DL — LOW (ref 32–36)
MCHC RBC-ENTMCNC: 31.8 GM/DL — LOW (ref 32–36)
MCHC RBC-ENTMCNC: 32 GM/DL — SIGNIFICANT CHANGE UP (ref 32–36)
MCV RBC AUTO: 89.4 FL — SIGNIFICANT CHANGE UP (ref 80–100)
MCV RBC AUTO: 89.9 FL — SIGNIFICANT CHANGE UP (ref 80–100)
MCV RBC AUTO: 91 FL — SIGNIFICANT CHANGE UP (ref 80–100)
NITRITE UR-MCNC: NEGATIVE — SIGNIFICANT CHANGE UP
O2 CT VFR BLD CALC: 28 MMHG — LOW (ref 30–65)
O2 CT VFR BLD CALC: 32 MMHG — SIGNIFICANT CHANGE UP (ref 30–65)
O2 CT VFR BLD CALC: 33 MMHG — SIGNIFICANT CHANGE UP (ref 30–65)
O2 CT VFR BLD CALC: 41 MMHG — SIGNIFICANT CHANGE UP (ref 30–65)
PCO2 BLDMV: 42 MMHG — SIGNIFICANT CHANGE UP (ref 30–65)
PCO2 BLDMV: 44 MMHG — SIGNIFICANT CHANGE UP (ref 30–65)
PCO2 BLDMV: 45 MMHG — SIGNIFICANT CHANGE UP (ref 30–65)
PCO2 BLDMV: 46 MMHG — SIGNIFICANT CHANGE UP (ref 30–65)
PH BLDMV: 7.32 — SIGNIFICANT CHANGE UP (ref 7.32–7.45)
PH BLDMV: 7.33 — SIGNIFICANT CHANGE UP (ref 7.32–7.45)
PH BLDMV: 7.34 — SIGNIFICANT CHANGE UP (ref 7.32–7.45)
PH BLDMV: 7.37 — SIGNIFICANT CHANGE UP (ref 7.32–7.45)
PH UR: 5.5 — SIGNIFICANT CHANGE UP (ref 5–8)
PHOSPHATE SERPL-MCNC: 3.8 MG/DL — SIGNIFICANT CHANGE UP (ref 2.5–4.5)
PLATELET # BLD AUTO: 30 K/UL — LOW (ref 150–400)
PLATELET # BLD AUTO: 30 K/UL — LOW (ref 150–400)
PLATELET # BLD AUTO: 34 K/UL — LOW (ref 150–400)
POTASSIUM SERPL-MCNC: 4.5 MMOL/L — SIGNIFICANT CHANGE UP (ref 3.5–5.3)
POTASSIUM SERPL-MCNC: 4.6 MMOL/L — SIGNIFICANT CHANGE UP (ref 3.5–5.3)
POTASSIUM SERPL-SCNC: 4.5 MMOL/L — SIGNIFICANT CHANGE UP (ref 3.5–5.3)
POTASSIUM SERPL-SCNC: 4.6 MMOL/L — SIGNIFICANT CHANGE UP (ref 3.5–5.3)
PROCALCITONIN SERPL-MCNC: 9.52 NG/ML — HIGH (ref 0–0.04)
PROT SERPL-MCNC: 5.6 G/DL — LOW (ref 6–8.3)
PROT SERPL-MCNC: 5.9 G/DL — LOW (ref 6–8.3)
PROT UR-MCNC: NEGATIVE — SIGNIFICANT CHANGE UP
PROTHROM AB SERPL-ACNC: 21.7 SEC — HIGH (ref 9.8–12.7)
RBC # BLD: 2.67 M/UL — LOW (ref 4.2–5.8)
RBC # BLD: 2.84 M/UL — LOW (ref 4.2–5.8)
RBC # BLD: 2.95 M/UL — LOW (ref 4.2–5.8)
RBC # FLD: 21.1 % — HIGH (ref 10.3–14.5)
RBC # FLD: 21.7 % — HIGH (ref 10.3–14.5)
RBC # FLD: 21.9 % — HIGH (ref 10.3–14.5)
RBC CASTS # UR COMP ASSIST: SIGNIFICANT CHANGE UP /HPF (ref 0–2)
RH IG SCN BLD-IMP: POSITIVE — SIGNIFICANT CHANGE UP
SAO2 % BLDMV: 51 % — LOW (ref 60–90)
SAO2 % BLDMV: 58 % — LOW (ref 60–90)
SAO2 % BLDMV: 63 % — SIGNIFICANT CHANGE UP (ref 60–90)
SAO2 % BLDMV: 70 % — SIGNIFICANT CHANGE UP (ref 60–90)
SODIUM SERPL-SCNC: 127 MMOL/L — LOW (ref 135–145)
SODIUM SERPL-SCNC: 131 MMOL/L — LOW (ref 135–145)
SP GR SPEC: 1.01 — LOW (ref 1.01–1.02)
SPECIMEN SOURCE: SIGNIFICANT CHANGE UP
SRA INTERP SER-IMP: SIGNIFICANT CHANGE UP
UROBILINOGEN FLD QL: NEGATIVE — SIGNIFICANT CHANGE UP
WBC # BLD: 14.7 K/UL — HIGH (ref 3.8–10.5)
WBC # BLD: 16.4 K/UL — HIGH (ref 3.8–10.5)
WBC # BLD: 18.6 K/UL — HIGH (ref 3.8–10.5)
WBC # FLD AUTO: 14.7 K/UL — HIGH (ref 3.8–10.5)
WBC # FLD AUTO: 16.4 K/UL — HIGH (ref 3.8–10.5)
WBC # FLD AUTO: 18.6 K/UL — HIGH (ref 3.8–10.5)
WBC UR QL: SIGNIFICANT CHANGE UP /HPF (ref 0–5)

## 2017-06-16 PROCEDURE — 71010: CPT | Mod: 26

## 2017-06-16 PROCEDURE — 93750 INTERROGATION VAD IN PERSON: CPT

## 2017-06-16 PROCEDURE — 99233 SBSQ HOSP IP/OBS HIGH 50: CPT | Mod: GC

## 2017-06-16 PROCEDURE — 93306 TTE W/DOPPLER COMPLETE: CPT | Mod: 26

## 2017-06-16 PROCEDURE — 95957 EEG DIGITAL ANALYSIS: CPT | Mod: 26

## 2017-06-16 PROCEDURE — 76705 ECHO EXAM OF ABDOMEN: CPT | Mod: 26,RT

## 2017-06-16 PROCEDURE — 99233 SBSQ HOSP IP/OBS HIGH 50: CPT | Mod: 25,GC

## 2017-06-16 PROCEDURE — 95951: CPT | Mod: 26

## 2017-06-16 RX ORDER — VANCOMYCIN HCL 1 G
1000 VIAL (EA) INTRAVENOUS ONCE
Qty: 0 | Refills: 0 | Status: COMPLETED | OUTPATIENT
Start: 2017-06-16 | End: 2017-06-16

## 2017-06-16 RX ORDER — ALBUMIN HUMAN 25 %
250 VIAL (ML) INTRAVENOUS ONCE
Qty: 0 | Refills: 0 | Status: COMPLETED | OUTPATIENT
Start: 2017-06-16 | End: 2017-06-16

## 2017-06-16 RX ORDER — MAGNESIUM SULFATE 500 MG/ML
1 VIAL (ML) INJECTION ONCE
Qty: 0 | Refills: 0 | Status: COMPLETED | OUTPATIENT
Start: 2017-06-16 | End: 2017-06-16

## 2017-06-16 RX ORDER — POTASSIUM CHLORIDE 20 MEQ
10 PACKET (EA) ORAL
Qty: 0 | Refills: 0 | Status: COMPLETED | OUTPATIENT
Start: 2017-06-16 | End: 2017-06-16

## 2017-06-16 RX ORDER — MILRINONE LACTATE 1 MG/ML
0.25 INJECTION, SOLUTION INTRAVENOUS
Qty: 20 | Refills: 0 | Status: DISCONTINUED | OUTPATIENT
Start: 2017-06-16 | End: 2017-06-24

## 2017-06-16 RX ORDER — POTASSIUM CHLORIDE 20 MEQ
10 PACKET (EA) ORAL ONCE
Qty: 0 | Refills: 0 | Status: COMPLETED | OUTPATIENT
Start: 2017-06-16 | End: 2017-06-16

## 2017-06-16 RX ORDER — INSULIN LISPRO 100/ML
VIAL (ML) SUBCUTANEOUS EVERY 4 HOURS
Qty: 0 | Refills: 0 | Status: DISCONTINUED | OUTPATIENT
Start: 2017-06-16 | End: 2017-06-20

## 2017-06-16 RX ORDER — NOREPINEPHRINE BITARTRATE/D5W 8 MG/250ML
0.04 PLASTIC BAG, INJECTION (ML) INTRAVENOUS
Qty: 16 | Refills: 0 | Status: DISCONTINUED | OUTPATIENT
Start: 2017-06-16 | End: 2017-06-17

## 2017-06-16 RX ORDER — DEXMEDETOMIDINE HYDROCHLORIDE IN 0.9% SODIUM CHLORIDE 4 UG/ML
0.11 INJECTION INTRAVENOUS
Qty: 200 | Refills: 0 | Status: DISCONTINUED | OUTPATIENT
Start: 2017-06-16 | End: 2017-06-16

## 2017-06-16 RX ORDER — HYDROMORPHONE HYDROCHLORIDE 2 MG/ML
0.5 INJECTION INTRAMUSCULAR; INTRAVENOUS; SUBCUTANEOUS ONCE
Qty: 0 | Refills: 0 | Status: DISCONTINUED | OUTPATIENT
Start: 2017-06-16 | End: 2017-06-16

## 2017-06-16 RX ORDER — AMIODARONE HYDROCHLORIDE 400 MG/1
150 TABLET ORAL ONCE
Qty: 0 | Refills: 0 | Status: COMPLETED | OUTPATIENT
Start: 2017-06-16 | End: 2017-06-16

## 2017-06-16 RX ORDER — WARFARIN SODIUM 2.5 MG/1
2.5 TABLET ORAL ONCE
Qty: 0 | Refills: 0 | Status: COMPLETED | OUTPATIENT
Start: 2017-06-16 | End: 2017-06-16

## 2017-06-16 RX ORDER — MEROPENEM 1 G/30ML
1000 INJECTION INTRAVENOUS EVERY 12 HOURS
Qty: 0 | Refills: 0 | Status: DISCONTINUED | OUTPATIENT
Start: 2017-06-16 | End: 2017-06-16

## 2017-06-16 RX ORDER — MEROPENEM 1 G/30ML
500 INJECTION INTRAVENOUS EVERY 12 HOURS
Qty: 0 | Refills: 0 | Status: DISCONTINUED | OUTPATIENT
Start: 2017-06-16 | End: 2017-06-20

## 2017-06-16 RX ADMIN — Medication 5 MG/HR: at 19:51

## 2017-06-16 RX ADMIN — MEROPENEM 200 MILLIGRAM(S): 1 INJECTION INTRAVENOUS at 04:41

## 2017-06-16 RX ADMIN — Medication 3 MICROGRAM(S)/KG/MIN: at 19:51

## 2017-06-16 RX ADMIN — AMIODARONE HYDROCHLORIDE 400 MILLIGRAM(S): 400 TABLET ORAL at 05:29

## 2017-06-16 RX ADMIN — VASOPRESSIN 4 UNIT(S)/MIN: 20 INJECTION INTRAVENOUS at 19:52

## 2017-06-16 RX ADMIN — AMIODARONE HYDROCHLORIDE 600 MILLIGRAM(S): 400 TABLET ORAL at 10:55

## 2017-06-16 RX ADMIN — Medication 250 MILLIGRAM(S): at 05:17

## 2017-06-16 RX ADMIN — Medication 50 MILLIEQUIVALENT(S): at 15:26

## 2017-06-16 RX ADMIN — AMIODARONE HYDROCHLORIDE 16.67 MG/MIN: 400 TABLET ORAL at 07:25

## 2017-06-16 RX ADMIN — CHLORHEXIDINE GLUCONATE 15 MILLILITER(S): 213 SOLUTION TOPICAL at 17:03

## 2017-06-16 RX ADMIN — Medication 50 MILLIEQUIVALENT(S): at 10:55

## 2017-06-16 RX ADMIN — PANTOPRAZOLE SODIUM 40 MILLIGRAM(S): 20 TABLET, DELAYED RELEASE ORAL at 21:07

## 2017-06-16 RX ADMIN — HYDROMORPHONE HYDROCHLORIDE 0.5 MILLIGRAM(S): 2 INJECTION INTRAMUSCULAR; INTRAVENOUS; SUBCUTANEOUS at 10:20

## 2017-06-16 RX ADMIN — Medication 100 GRAM(S): at 11:29

## 2017-06-16 RX ADMIN — ARGATROBAN 0.9 MICROGRAM(S)/KG/MIN: 50 INJECTION, SOLUTION INTRAVENOUS at 19:51

## 2017-06-16 RX ADMIN — MILRINONE LACTATE 8.44 MICROGRAM(S)/KG/MIN: 1 INJECTION, SOLUTION INTRAVENOUS at 14:30

## 2017-06-16 RX ADMIN — Medication 100 GRAM(S): at 20:44

## 2017-06-16 RX ADMIN — SODIUM CHLORIDE 10 MILLILITER(S): 9 INJECTION INTRAMUSCULAR; INTRAVENOUS; SUBCUTANEOUS at 07:22

## 2017-06-16 RX ADMIN — PROPOFOL 5 MICROGRAM(S)/KG/MIN: 10 INJECTION, EMULSION INTRAVENOUS at 07:22

## 2017-06-16 RX ADMIN — HYDROMORPHONE HYDROCHLORIDE 0.5 MILLIGRAM(S): 2 INJECTION INTRAMUSCULAR; INTRAVENOUS; SUBCUTANEOUS at 10:00

## 2017-06-16 RX ADMIN — Medication 50 MILLIEQUIVALENT(S): at 11:29

## 2017-06-16 RX ADMIN — AMIODARONE HYDROCHLORIDE 600 MILLIGRAM(S): 400 TABLET ORAL at 18:01

## 2017-06-16 RX ADMIN — Medication 7.5 MG/HR: at 23:15

## 2017-06-16 RX ADMIN — Medication 50 MILLIEQUIVALENT(S): at 12:55

## 2017-06-16 RX ADMIN — INSULIN HUMAN 3 UNIT(S)/HR: 100 INJECTION, SOLUTION SUBCUTANEOUS at 07:26

## 2017-06-16 RX ADMIN — Medication 500 MILLILITER(S): at 15:11

## 2017-06-16 RX ADMIN — HYDROMORPHONE HYDROCHLORIDE 0.5 MILLIGRAM(S): 2 INJECTION INTRAMUSCULAR; INTRAVENOUS; SUBCUTANEOUS at 04:00

## 2017-06-16 RX ADMIN — ARGATROBAN 0.9 MICROGRAM(S)/KG/MIN: 50 INJECTION, SOLUTION INTRAVENOUS at 07:25

## 2017-06-16 RX ADMIN — Medication 50 MILLIEQUIVALENT(S): at 17:02

## 2017-06-16 RX ADMIN — Medication 50 MILLIEQUIVALENT(S): at 01:15

## 2017-06-16 RX ADMIN — Medication 2: at 22:04

## 2017-06-16 RX ADMIN — Medication 10 MG/HR: at 07:26

## 2017-06-16 RX ADMIN — Medication 50 MILLIEQUIVALENT(S): at 00:45

## 2017-06-16 RX ADMIN — DEXMEDETOMIDINE HYDROCHLORIDE IN 0.9% SODIUM CHLORIDE 2 MICROGRAM(S)/KG/HR: 4 INJECTION INTRAVENOUS at 10:13

## 2017-06-16 RX ADMIN — Medication 50 MILLIEQUIVALENT(S): at 20:15

## 2017-06-16 RX ADMIN — Medication 50 MILLIEQUIVALENT(S): at 15:00

## 2017-06-16 RX ADMIN — WARFARIN SODIUM 2.5 MILLIGRAM(S): 2.5 TABLET ORAL at 22:05

## 2017-06-16 RX ADMIN — AMIODARONE HYDROCHLORIDE 16.67 MG/MIN: 400 TABLET ORAL at 19:50

## 2017-06-16 RX ADMIN — VASOPRESSIN 4 UNIT(S)/MIN: 20 INJECTION INTRAVENOUS at 07:21

## 2017-06-16 RX ADMIN — AMIODARONE HYDROCHLORIDE 400 MILLIGRAM(S): 400 TABLET ORAL at 14:29

## 2017-06-16 RX ADMIN — MEROPENEM 200 MILLIGRAM(S): 1 INJECTION INTRAVENOUS at 17:02

## 2017-06-16 RX ADMIN — Medication 50 MILLIEQUIVALENT(S): at 23:00

## 2017-06-16 RX ADMIN — Medication 50 MILLIEQUIVALENT(S): at 00:15

## 2017-06-16 RX ADMIN — AMIODARONE HYDROCHLORIDE 400 MILLIGRAM(S): 400 TABLET ORAL at 22:05

## 2017-06-16 RX ADMIN — PROPOFOL 5 MICROGRAM(S)/KG/MIN: 10 INJECTION, EMULSION INTRAVENOUS at 19:52

## 2017-06-16 RX ADMIN — MILRINONE LACTATE 8.44 MICROGRAM(S)/KG/MIN: 1 INJECTION, SOLUTION INTRAVENOUS at 19:51

## 2017-06-16 RX ADMIN — Medication 50 MILLIEQUIVALENT(S): at 23:30

## 2017-06-16 RX ADMIN — HYDROMORPHONE HYDROCHLORIDE 0.5 MILLIGRAM(S): 2 INJECTION INTRAMUSCULAR; INTRAVENOUS; SUBCUTANEOUS at 04:30

## 2017-06-16 RX ADMIN — CHLORHEXIDINE GLUCONATE 15 MILLILITER(S): 213 SOLUTION TOPICAL at 05:35

## 2017-06-16 NOTE — PROGRESS NOTE ADULT - SUBJECTIVE AND OBJECTIVE BOX
Interval History:  Opens eyes to voice, follows simple commands.     Medications:  sodium chloride 0.9%. 1000milliLiter(s) IV Continuous <Continuous>  docusate sodium 100milliGRAM(s) Oral three times a day  insulin Infusion 3Unit(s)/Hr IV Continuous <Continuous>  dextrose 5%. 1000milliLiter(s) IV Continuous <Continuous>  dextrose 50% Injectable 25Gram(s) IV Push once  chlorhexidine 0.12% Liquid 15milliLiter(s) Swish and Spit two times a day  propofol Infusion 11.111MICROgram(s)/kG/Min IV Continuous <Continuous>  amiodarone Infusion 0.5mG/Min IV Continuous <Continuous>  amiodarone    Tablet 400milliGRAM(s) Oral every 8 hours  vasopressin Infusion 0.067Unit(s)/Min IV Continuous <Continuous>  pantoprazole  Injectable 40milliGRAM(s) IV Push every 24 hours  furosemide Infusion 20mG/Hr IV Continuous <Continuous>  argatroban Infusion 0.2MICROgram(s)/kG/Min IV Continuous <Continuous>  dexmedetomidine Infusion 0.107MICROgram(s)/kG/Hr IV Continuous <Continuous>  meropenem IVPB 500milliGRAM(s) IV Intermittent every 12 hours  insulin lispro (HumaLOG) corrective regimen sliding scale  SubCutaneous every 4 hours  milrinone Infusion 0.375MICROgram(s)/kG/Min IV Continuous <Continuous>  potassium chloride  10 mEq/50 mL IVPB 10milliEquivalent(s) IV Intermittent every 1 hour    Vitals:  T(C): 36.4, Max: 36.7 (-15 @ 14:00)  HR: 80 (70 - 106)  BP: --  BP(mean): --  ABP: 88/77 (58/50 - 110/91)  ABP(mean): 82 (54 - 99)  RR: 17 (8 - 25)  SpO2: 100% (96% - 100%)  Wt(kg): --  CVP(cm H2O): --  CO: 3.3 (2.9 - 7.8)  CI: 1.7 (1.6 - 4.1)  PA: 45/20 (31/14 - 302/291)  PA(mean): 28 (20 - 298)  PCWP: --  SVR: 1137 (278 - 1685)  PVR: --    Daily     Daily Weight in k.9 (2017 00:00)      I&O's Summary  I & Os for 24h ending 2017 07:00  =============================================  IN: 3238.3 ml / OUT: 5335 ml / NET: -2096.7 ml    I & Os for current day (as of 2017 13:12)  =============================================  IN: 1520.1 ml / OUT: 1265 ml / NET: 255.1 ml      Physical Exam:  Appearance: Intubated  HEENT: Lines in place  Cardiovascular: Quiet S1 S2, + LVAD hum  Respiratory: Clear to auscultation anteriorly  Gastrointestinal: Soft, mild tenderness  Skin: No cyanosis	  Neurologic: Non-focal  Extremities: Trace hip edema  Psychiatry: Sedated    LVAD Interrogation:  Pump Flow: 3.2  Pump Speed: 9200  Pulse Index: 6.1  Pump Power: 4.5  VAD Events: No events  Driveline evaluation: Dressing C/D/I  Programming Changes: No changes made    Labs:                        7.3    16.4  )-----------( 30       ( 2017 05:32 )             24.0     06-16    127<L>  |  93<L>  |  33<H>  ----------------------------<  112<H>  4.6   |  19<L>  |  2.63<H>    Ca    8.3<L>      2017 01:54  Phos  3.8     06-16  Mg     2.7     06-15    TPro  5.9<L>  /  Alb  3.0<L>  /  TBili  6.3<H>  /  DBili  x   /  AST  101<H>  /  ALT  53<H>  /  AlkPhos  89  06-16    PT/INR - ( 15 Kennedy 2017 23:34 )   PT: 23.7 sec;   INR: 2.16 ratio         PTT - ( 2017 11:08 )  PTT:63.9 sec        Oxygen Saturation, Mixed: 70 % ( @ 02:54)  Oxygen Saturation, Mixed: 63 % (06-15 @ 22:16)  Oxygen Saturation, Mixed: 66 % (06-15 @ 20:56)  Oxygen Saturation, Mixed: 69 % (06-15 @ 18:13)  Oxygen Saturation, Mixed: 69 % (06-15 @ 14:05)  Oxygen Saturation, Mixed: 63 % (06-15 @ 10:07)  Oxygen Saturation, Mixed: 70 % (06-15 @ 05:57)  Oxygen Saturation, Mixed: 71 % (06-15 @ 02:06)  Oxygen Saturation, Mixed: 66 % ( @ 21:52)  Oxygen Saturation, Mixed: 65 % ( @ 15:56)  Oxygen Saturation, Mixed: 68 % ( @ 07:49)  Oxygen Saturation, Mixed: 68 % ( @ 20:07)  Oxygen Saturation, Mixed: 72 % ( @ 18:00)  Oxygen Saturation, Mixed: 75 % ( @ 16:19)  Oxygen Saturation, Mixed: 73 % ( @ 14:00)    Lactate Dehydrogenase, Serum: 814 U/L ( @ 01:54)  Lactate Dehydrogenase, Serum: 889 U/L (06-15 @ 02:06)  Lactate Dehydrogenase, Serum: 1098 U/L ( @ 01:35)          TELEMETRY: 	      [ ] Echocardiogram:

## 2017-06-16 NOTE — PROGRESS NOTE ADULT - ATTENDING COMMENTS
Making progress with improved urine output and lower CVP. Would continue at milrinone 0.375 mcg/kg/min. Start coumadin tonight. Continue argatroban drip.

## 2017-06-16 NOTE — PROGRESS NOTE ADULT - ATTENDING COMMENTS
NORMAN- initially preop cardiorenal +mild retention component (but did not rquire emily)  creatinine had improved to 1.4---s/p LVAD- creatinine has been rising s/p  v tach as well as BP fluctuations -likely ATN  Briefly on CVVHDF, now off  Making god urine with CVP close to 13  Continue with the lasix drip      case d/w dr Tam in detail NORMAN- initially preop cardiorenal +mild retention component (but did not require diaz)  creatinine had improved to 1.4---s/p LVAD- creatinine has been rising s/p  v tach as well as BP fluctuations -likely ATN  Briefly on CVVHDF, now off  renal function slightly worse but with excellent U/O  remains on milrinone, low dose vasopressin  Continue with the lasix drip ( lowered to 10 mg/hr)     Hyponatremia: likely hypervolemic  Overall stable

## 2017-06-16 NOTE — PROGRESS NOTE ADULT - SUBJECTIVE AND OBJECTIVE BOX
[facility] DIVISION OF KIDNEY DISEASES AND HYPERTENSION -- FOLLOW UP NOTE  --------------------------------------------------------------------------------  Chief Complaint: Heart failure      24hour events/Subjective: pt seen and examined. pt remains intubated and hypotensive, on and off pressor support.         PAST HISTORY  --------------------------------------------------------------------------------  No significant changes to PMH, PSH, FHx, SHx, unless otherwise noted    ALLERGIES & MEDICATIONS  --------------------------------------------------------------------------------  Allergies    No Known Allergies    Intolerances      Standing Inpatient Medications  sodium chloride 0.9%. 1000milliLiter(s) IV Continuous <Continuous>  docusate sodium 100milliGRAM(s) Oral three times a day  insulin Infusion 3Unit(s)/Hr IV Continuous <Continuous>  dextrose 5%. 1000milliLiter(s) IV Continuous <Continuous>  dextrose 50% Injectable 25Gram(s) IV Push once  chlorhexidine 0.12% Liquid 15milliLiter(s) Swish and Spit two times a day  propofol Infusion 11.111MICROgram(s)/kG/Min IV Continuous <Continuous>  amiodarone Infusion 0.5mG/Min IV Continuous <Continuous>  amiodarone    Tablet 400milliGRAM(s) Oral every 8 hours  vasopressin Infusion 0.067Unit(s)/Min IV Continuous <Continuous>  pantoprazole  Injectable 40milliGRAM(s) IV Push every 24 hours  furosemide Infusion 20mG/Hr IV Continuous <Continuous>  argatroban Infusion 0.2MICROgram(s)/kG/Min IV Continuous <Continuous>  freetext medication -  Infusion 20milliGRAM(s) IV Continuous <Continuous>  meropenem IVPB 1000milliGRAM(s) IV Intermittent every 12 hours    PRN Inpatient Medications      REVIEW OF SYSTEMS  --------------------------------------------------------------------------------  unable to obtain        VITALS/PHYSICAL EXAM  --------------------------------------------------------------------------------  T(C): 36.4, Max: 36.7 (06-15 @ 12:00)  HR: 90 (73 - 106)  BP: --  RR: 10 (7 - 24)  SpO2: 99% (96% - 100%)  Wt(kg): --        I & Os for current day (as of 06-16-17 @ 09:21)  =============================================  IN: 3238.3 ml / OUT: 5335 ml / NET: -2096.7 ml    Physical Exam:  	Gen: NAD  	HEENT: +ETT  	Pulm: Mechanical breath sounds b/l  	CV: S1S2  	Abd: Soft, +BS  	Ext: No LE edema B/L                      Neuro: Sedated  	Skin: Warm and Dry   	Other : +emily     LABS/STUDIES  --------------------------------------------------------------------------------              7.3    16.4  >-----------<  30       [06-16-17 @ 05:32]              24.0     127  |  93  |  33  ----------------------------<  112      [06-16-17 @ 01:54]  4.6   |  19  |  2.63        Ca     8.3     [06-16-17 @ 01:54]      Mg     2.7     [06-15-17 @ 20:43]      Phos  3.8     [06-16-17 @ 01:54]    TPro  5.9  /  Alb  3.0  /  TBili  6.3  /  DBili  x   /  AST  101  /  ALT  53  /  AlkPhos  89  [06-16-17 @ 01:54]    PT/INR: PT 23.7 , INR 2.16       [06-15-17 @ 23:34]  PTT: 69.0       [06-16-17 @ 05:32]          [06-16-17 @ 01:54]    Creatinine Trend:  SCr 2.63 [06-16 @ 01:54]  SCr 2.63 [06-15 @ 14:10]  SCr 2.47 [06-15 @ 06:02]  SCr 2.33 [06-15 @ 02:06]  SCr 2.23 [06-14 @ 23:41]    Urinalysis - [06-16-17 @ 04:51]      Color Yellow / Appearance Clear / SG 1.008 / pH 5.5      Gluc Negative / Ketone Negative  / Bili Negative / Urobili Negative       Blood Small / Protein Negative / Leuk Est Negative / Nitrite Negative      RBC 0-2 / WBC 3-5 / Hyaline 0-2 / Gran  / Sq Epi  / Non Sq Epi OCC / Bacteria       Iron 20, TIBC 352, %sat 6      [06-08-17 @ 07:14]  Ferritin 121.0      [06-09-17 @ 19:08]  HbA1c 5.5      [06-07-17 @ 06:14]  TSH 2.33      [06-14-17 @ 14:51]  Lipid: chol 62, TG 33, HDL 17, LDL 38      [06-07-17 @ 06:14] [facility] DIVISION OF KIDNEY DISEASES AND HYPERTENSION -- FOLLOW UP NOTE  --------------------------------------------------------------------------------  Chief Complaint: Heart failure      24hour events/Subjective: pt seen and examined. pt remains intubated and hypotensive, on and off pressor support.         PAST HISTORY  --------------------------------------------------------------------------------  No significant changes to PMH, PSH, FHx, SHx, unless otherwise noted    ALLERGIES & MEDICATIONS  --------------------------------------------------------------------------------  Allergies    No Known Allergies    Intolerances      Standing Inpatient Medications  sodium chloride 0.9%. 1000milliLiter(s) IV Continuous <Continuous>  docusate sodium 100milliGRAM(s) Oral three times a day  insulin Infusion 3Unit(s)/Hr IV Continuous <Continuous>  dextrose 5%. 1000milliLiter(s) IV Continuous <Continuous>  dextrose 50% Injectable 25Gram(s) IV Push once  chlorhexidine 0.12% Liquid 15milliLiter(s) Swish and Spit two times a day  propofol Infusion 11.111MICROgram(s)/kG/Min IV Continuous <Continuous>  amiodarone Infusion 0.5mG/Min IV Continuous <Continuous>  amiodarone    Tablet 400milliGRAM(s) Oral every 8 hours  vasopressin Infusion 0.067Unit(s)/Min IV Continuous <Continuous>  pantoprazole  Injectable 40milliGRAM(s) IV Push every 24 hours  furosemide Infusion 20mG/Hr IV Continuous <Continuous>  argatroban Infusion 0.2MICROgram(s)/kG/Min IV Continuous <Continuous>  freetext medication -  Infusion 20milliGRAM(s) IV Continuous <Continuous>  meropenem IVPB 1000milliGRAM(s) IV Intermittent every 12 hours    PRN Inpatient Medications      REVIEW OF SYSTEMS  --------------------------------------------------------------------------------  unable to obtain        VITALS/PHYSICAL EXAM  --------------------------------------------------------------------------------  T(C): 36.4, Max: 36.7 (06-15 @ 12:00)  HR: 90 (73 - 106)  BP: --  RR: 10 (7 - 24)  SpO2: 99% (96% - 100%)  Wt(kg): --        I & Os for current day (as of 06-16-17 @ 09:21)  =============================================  IN: 3238.3 ml / OUT: 5335 ml / NET: -2096.7 ml    Physical Exam:  	Gen: NAD  	HEENT: +ETT  	Pulm: Mechanical breath sounds b/l  	CV: LVAD sound  	Abd: Soft, +BS  	Ext: Trace LE edema B/L                      Neuro: Sedated  	Skin: Warm and Dry   	Other : +emily     LABS/STUDIES  --------------------------------------------------------------------------------              7.3    16.4  >-----------<  30       [06-16-17 @ 05:32]              24.0     127  |  93  |  33  ----------------------------<  112      [06-16-17 @ 01:54]  4.6   |  19  |  2.63        Ca     8.3     [06-16-17 @ 01:54]      Mg     2.7     [06-15-17 @ 20:43]      Phos  3.8     [06-16-17 @ 01:54]    TPro  5.9  /  Alb  3.0  /  TBili  6.3  /  DBili  x   /  AST  101  /  ALT  53  /  AlkPhos  89  [06-16-17 @ 01:54]    PT/INR: PT 23.7 , INR 2.16       [06-15-17 @ 23:34]  PTT: 69.0       [06-16-17 @ 05:32]          [06-16-17 @ 01:54]    Creatinine Trend:  SCr 2.63 [06-16 @ 01:54]  SCr 2.63 [06-15 @ 14:10]  SCr 2.47 [06-15 @ 06:02]  SCr 2.33 [06-15 @ 02:06]  SCr 2.23 [06-14 @ 23:41]    Urinalysis - [06-16-17 @ 04:51]      Color Yellow / Appearance Clear / SG 1.008 / pH 5.5      Gluc Negative / Ketone Negative  / Bili Negative / Urobili Negative       Blood Small / Protein Negative / Leuk Est Negative / Nitrite Negative      RBC 0-2 / WBC 3-5 / Hyaline 0-2 / Gran  / Sq Epi  / Non Sq Epi OCC / Bacteria       Iron 20, TIBC 352, %sat 6      [06-08-17 @ 07:14]  Ferritin 121.0      [06-09-17 @ 19:08]  HbA1c 5.5      [06-07-17 @ 06:14]  TSH 2.33      [06-14-17 @ 14:51]  Lipid: chol 62, TG 33, HDL 17, LDL 38      [06-07-17 @ 06:14]

## 2017-06-16 NOTE — PROGRESS NOTE ADULT - PROBLEM SELECTOR PLAN 3
RPMs at 9200.   No VAD alarms or significant events  Continue aspirin 81 mg daily  On argatroban gtt. RPMs at 9200.   No VAD alarms or significant events  Continue aspirin 81 mg daily.  Currently therpeutic on argatroban.   Start coumadin 2.5 mg tonight.   On argatroban gtt.

## 2017-06-16 NOTE — PROGRESS NOTE ADULT - PROBLEM SELECTOR PLAN 1
CVP increased again, but still making adequate urine. If CVPs remain high may need to readjust Lasix. Doing well with decreasing CVP on current regimen.

## 2017-06-16 NOTE — PROGRESS NOTE ADULT - ASSESSMENT
67 year old man with ACC/AHA stage D congestive heart failure with severely reduced LV systolic function due to a nonischemic dilated cardiomyopathy s/p HM2 LVAD on 6/12 with post-operative course complicated by hemodynamically but self-terminating ventricular tachycardia. He continues on amiodarone for recurrent VT and his inotropes and vasopressors are gradually being weaned. 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

## 2017-06-16 NOTE — PROGRESS NOTE ADULT - PROBLEM SELECTOR PLAN 2
Currrently milrinone at 0.375 and drop in CI w/ increased CVP, restarting nitric oxide and monitor response. Lactic acid normal. Currrently milrinone at 0.375. Lactic acid normal.

## 2017-06-16 NOTE — EEG REPORT - NS EEG TEXT BOX
6/13/2017    Hx: Concern for Seizures      Study Interpretation:    FINDINGS:  There was no clear posterior dominant rhythm.  The background was attenuated and not reactive to environmental stimuli.  It mainly consisted of severely attenuated polymorphic delta and theta frequency activity with overlying beta frequency activity.    Sleep Background:  Stages of sleep could not be delineated.    Other Paroxysmal Non-Epileptiform Findings:    None.    Epileptiform Activity:   No epileptiform discharges were present.    Events:  No clinical events were recorded.  No seizures were recorded.    Activation Procedures:   -Hyperventilation was not performed.    -Photic stimulation was not performed.    Artifacts:  Intermittent myogenic and movement artifacts were noted.    Compressed Spectral Array Digital Analysis    FINDINGS:  Compressed Spectral Array (CSA) data was reviewed separately and correlated with the electroencephalographic findings detailed above.  CSA showed a variable spectral pattern.  Areas of increased power in particular were reviewed in detail, and compared with the raw EEG data.  Areas of abrupt increases in spectral power were reviewed to exclude seizures, and were determined to be artifactual in nature.    The relative ratio of the power of delta range frequencies and faster frequencies remained stable over the course of the study.  There was no definitive increase in the relative power in the delta frequency spectrum apparent in the left hemisphere versus the right hemisphere.      Compressed Spectral Array (Digital Analysis) Summary/ Impression:  No persistent hemispheric asymmetry.  Intermittent areas of increased power reviewed, without definite epileptiform activity associated on CSA.      EEG Classification:  Abnormal study  -	severe diffuse or multifocal cerebral dysfunction    Impression:  Findings indicate non-specific severe diffuse or multifocal cerebral dysfunction. There were no epileptiform abnormalities recorded.
6/13-6/14/2017    Hx: Concern for Seizures      Study Interpretation:    FINDINGS:  There was no clear posterior dominant rhythm.  The background was attenuated and not clearly reactive to environmental stimuli.  It mainly consisted of attenuated polymorphic delta and theta frequency activity with overlying beta frequency activity.    Sleep Background:  Stages of sleep could not be delineated.    Other Paroxysmal Non-Epileptiform Findings:    None.    Epileptiform Activity:   No epileptiform discharges were present.    Events:  No clinical events were recorded.  No seizures were recorded.    Activation Procedures:   -Hyperventilation was not performed.    -Photic stimulation was not performed.    Artifacts:  Intermittent myogenic and movement artifacts were noted.    Compressed Spectral Array Digital Analysis    FINDINGS:  Compressed Spectral Array (CSA) data was reviewed separately and correlated with the electroencephalographic findings detailed above.  CSA showed a variable spectral pattern.  Areas of increased power in particular were reviewed in detail, and compared with the raw EEG data.  Areas of abrupt increases in spectral power were reviewed to exclude seizures, and were determined to be artifactual in nature.    The relative ratio of the power of delta range frequencies and faster frequencies remained stable over the course of the study.  There was no definitive increase in the relative power in the delta frequency spectrum apparent in the left hemisphere versus the right hemisphere.      Compressed Spectral Array (Digital Analysis) Summary/ Impression:  No persistent hemispheric asymmetry.  Intermittent areas of increased power reviewed, without definite epileptiform activity associated on CSA.      EEG Classification:  Abnormal study  -	moderate to severe diffuse or multifocal cerebral dysfunction    Impression:  Findings indicate non-specific moderate to severe diffuse or multifocal cerebral dysfunction. There were no epileptiform abnormalities recorded.
6/14-6/15/2017    Hx: Concern for Seizures      Study Interpretation:    FINDINGS:  There was no clear posterior dominant rhythm.  The background was attenuated and not clearly reactive to environmental stimuli.  It mainly consisted of attenuated polymorphic delta and theta frequency activity with overlying beta frequency activity.    Sleep Background:  Stages of sleep could not be delineated.    Other Paroxysmal Non-Epileptiform Findings:    None.    Epileptiform Activity:   No epileptiform discharges were present.    Events:  One push button event for unclear clinical reasons without EEG changes.  No seizures were recorded.    Activation Procedures:   -Hyperventilation was not performed.    -Photic stimulation was not performed.    Artifacts:  Intermittent myogenic and movement artifacts were noted.    Compressed Spectral Array Digital Analysis    FINDINGS:  Compressed Spectral Array (CSA) data was reviewed separately and correlated with the electroencephalographic findings detailed above.  CSA showed a variable spectral pattern.  Areas of increased power in particular were reviewed in detail, and compared with the raw EEG data.  Areas of abrupt increases in spectral power were reviewed to exclude seizures, and were determined to be artifactual in nature.    The relative ratio of the power of delta range frequencies and faster frequencies remained stable over the course of the study.  There was no definitive increase in the relative power in the delta frequency spectrum apparent in the left hemisphere versus the right hemisphere.      Compressed Spectral Array (Digital Analysis) Summary/ Impression:  No persistent hemispheric asymmetry.  Intermittent areas of increased power reviewed, without definite epileptiform activity associated on CSA.      EEG Classification:  Abnormal study  -	moderate to severe diffuse or multifocal cerebral dysfunction    Impression:  Findings indicate non-specific moderate to severe diffuse or multifocal cerebral dysfunction. There were no epileptiform abnormalities recorded.
6/15-6/16/2017    Hx: Concern for Seizures      Study Interpretation:    FINDINGS:  There was no clear posterior dominant rhythm.  The background was continuous and reactive to environmental stimuli.  It mainly consisted of irregular delta and theta frequency activity with intermixed faster frequency activity.    Sleep Background:  Stages of sleep could not be delineated.    Other Paroxysmal Non-Epileptiform Findings:    None.    Epileptiform Activity:   No epileptiform discharges were present.    Events:  No push button events.  No seizures were recorded.    Activation Procedures:   -Hyperventilation was not performed.    -Photic stimulation was not performed.    Artifacts:  Intermittent myogenic and movement artifacts were noted.    Compressed Spectral Array Digital Analysis    FINDINGS:  Compressed Spectral Array (CSA) data was reviewed separately and correlated with the electroencephalographic findings detailed above.  CSA showed a variable spectral pattern.  Areas of increased power in particular were reviewed in detail, and compared with the raw EEG data.  Areas of abrupt increases in spectral power were reviewed to exclude seizures, and were determined to be artifactual in nature.    The relative ratio of the power of delta range frequencies and faster frequencies remained stable over the course of the study.  There was no definitive increase in the relative power in the delta frequency spectrum apparent in the left hemisphere versus the right hemisphere.      Compressed Spectral Array (Digital Analysis) Summary/ Impression:  No persistent hemispheric asymmetry.  Intermittent areas of increased power reviewed, without definite epileptiform activity associated on CSA.      EEG Classification:  Abnormal study  -	moderate diffuse or multifocal cerebral dysfunction    Impression:  Findings indicate non-specific moderate diffuse or multifocal cerebral dysfunction. There were no epileptiform abnormalities recorded.

## 2017-06-16 NOTE — PROGRESS NOTE ADULT - PROBLEM SELECTOR PLAN 1
hemodynamically mediated in setting of decompensated heart failure +/- urinary retention component and hypotension.  Pt with uptrending  Scr to 2.63   (from 2.47 yesterday).  Pt non -oliguric (UO 4995). Continue to monirot off CVVHD. Monitor BMP  Strict I/O, avoid nephrotoxics, NSAIDs, RCA. Renal dose of medications.

## 2017-06-17 DIAGNOSIS — R50.9 FEVER, UNSPECIFIED: ICD-10-CM

## 2017-06-17 LAB
ALBUMIN SERPL ELPH-MCNC: 3 G/DL — LOW (ref 3.3–5)
ALP SERPL-CCNC: 94 U/L — SIGNIFICANT CHANGE UP (ref 40–120)
ALT FLD-CCNC: 54 U/L RC — HIGH (ref 10–45)
ANION GAP SERPL CALC-SCNC: 15 MMOL/L — SIGNIFICANT CHANGE UP (ref 5–17)
APTT BLD: 48 SEC — HIGH (ref 27.5–37.4)
APTT BLD: 51.5 SEC — HIGH (ref 27.5–37.4)
APTT BLD: 54.2 SEC — HIGH (ref 27.5–37.4)
APTT BLD: 57.9 SEC — HIGH (ref 27.5–37.4)
APTT BLD: 59.2 SEC — HIGH (ref 27.5–37.4)
AST SERPL-CCNC: 83 U/L — HIGH (ref 10–40)
BASE EXCESS BLDMV CALC-SCNC: -0.2 MMOL/L — SIGNIFICANT CHANGE UP (ref -3–3)
BILIRUB SERPL-MCNC: 5.5 MG/DL — HIGH (ref 0.2–1.2)
BUN SERPL-MCNC: 41 MG/DL — HIGH (ref 7–23)
CALCIUM SERPL-MCNC: 7.9 MG/DL — LOW (ref 8.4–10.5)
CHLORIDE SERPL-SCNC: 93 MMOL/L — LOW (ref 96–108)
CO2 BLDMV-SCNC: 26 MMOL/L — SIGNIFICANT CHANGE UP (ref 21–29)
CO2 SERPL-SCNC: 22 MMOL/L — SIGNIFICANT CHANGE UP (ref 22–31)
CREAT SERPL-MCNC: 2.6 MG/DL — HIGH (ref 0.5–1.3)
GAS PNL BLDA: SIGNIFICANT CHANGE UP
GAS PNL BLDMV: SIGNIFICANT CHANGE UP
GLUCOSE SERPL-MCNC: 113 MG/DL — HIGH (ref 70–99)
HAPTOGLOB SERPL-MCNC: 79 MG/DL — SIGNIFICANT CHANGE UP (ref 34–200)
HCO3 BLDMV-SCNC: 24 MMOL/L — SIGNIFICANT CHANGE UP (ref 20–28)
HCT VFR BLD CALC: 26.2 % — LOW (ref 39–50)
HCT VFR BLD CALC: 26.8 % — LOW (ref 39–50)
HGB BLD-MCNC: 8.4 G/DL — LOW (ref 13–17)
HGB BLD-MCNC: 8.6 G/DL — LOW (ref 13–17)
HOROWITZ INDEX BLDMV+IHG-RTO: 50 — SIGNIFICANT CHANGE UP
INR BLD: 1.52 RATIO — HIGH (ref 0.88–1.16)
INR BLD: 1.73 RATIO — HIGH (ref 0.88–1.16)
LDH SERPL L TO P-CCNC: 687 U/L — HIGH (ref 50–242)
MCHC RBC-ENTMCNC: 29 PG — SIGNIFICANT CHANGE UP (ref 27–34)
MCHC RBC-ENTMCNC: 29.4 PG — SIGNIFICANT CHANGE UP (ref 27–34)
MCHC RBC-ENTMCNC: 32.1 GM/DL — SIGNIFICANT CHANGE UP (ref 32–36)
MCHC RBC-ENTMCNC: 32.3 GM/DL — SIGNIFICANT CHANGE UP (ref 32–36)
MCV RBC AUTO: 90.5 FL — SIGNIFICANT CHANGE UP (ref 80–100)
MCV RBC AUTO: 91.1 FL — SIGNIFICANT CHANGE UP (ref 80–100)
O2 CT VFR BLD CALC: 39 MMHG — SIGNIFICANT CHANGE UP (ref 30–65)
PCO2 BLDMV: 43 MMHG — SIGNIFICANT CHANGE UP (ref 30–65)
PH BLDMV: 7.37 — SIGNIFICANT CHANGE UP (ref 7.32–7.45)
PLATELET # BLD AUTO: 52 K/UL — LOW (ref 150–400)
PLATELET # BLD AUTO: 71 K/UL — LOW (ref 150–400)
POTASSIUM SERPL-MCNC: 4.3 MMOL/L — SIGNIFICANT CHANGE UP (ref 3.5–5.3)
POTASSIUM SERPL-SCNC: 4.3 MMOL/L — SIGNIFICANT CHANGE UP (ref 3.5–5.3)
PROT SERPL-MCNC: 5.8 G/DL — LOW (ref 6–8.3)
PROTHROM AB SERPL-ACNC: 16.6 SEC — HIGH (ref 9.8–12.7)
PROTHROM AB SERPL-ACNC: 19.1 SEC — HIGH (ref 9.8–12.7)
RBC # BLD: 2.87 M/UL — LOW (ref 4.2–5.8)
RBC # BLD: 2.97 M/UL — LOW (ref 4.2–5.8)
RBC # FLD: 21.3 % — HIGH (ref 10.3–14.5)
RBC # FLD: 21.3 % — HIGH (ref 10.3–14.5)
SAO2 % BLDMV: 73 % — SIGNIFICANT CHANGE UP (ref 60–90)
SODIUM SERPL-SCNC: 130 MMOL/L — LOW (ref 135–145)
VANCOMYCIN TROUGH SERPL-MCNC: 23 UG/ML — HIGH (ref 10–20)
WBC # BLD: 13.7 K/UL — HIGH (ref 3.8–10.5)
WBC # BLD: 13.9 K/UL — HIGH (ref 3.8–10.5)
WBC # FLD AUTO: 13.7 K/UL — HIGH (ref 3.8–10.5)
WBC # FLD AUTO: 13.9 K/UL — HIGH (ref 3.8–10.5)

## 2017-06-17 PROCEDURE — 99292 CRITICAL CARE ADDL 30 MIN: CPT

## 2017-06-17 PROCEDURE — 99233 SBSQ HOSP IP/OBS HIGH 50: CPT | Mod: 25

## 2017-06-17 PROCEDURE — 36620 INSERTION CATHETER ARTERY: CPT

## 2017-06-17 PROCEDURE — 99232 SBSQ HOSP IP/OBS MODERATE 35: CPT | Mod: GC

## 2017-06-17 PROCEDURE — 93750 INTERROGATION VAD IN PERSON: CPT

## 2017-06-17 PROCEDURE — 36556 INSERT NON-TUNNEL CV CATH: CPT

## 2017-06-17 PROCEDURE — 99291 CRITICAL CARE FIRST HOUR: CPT

## 2017-06-17 PROCEDURE — 71010: CPT | Mod: 26,76

## 2017-06-17 RX ORDER — ASPIRIN/CALCIUM CARB/MAGNESIUM 324 MG
81 TABLET ORAL DAILY
Qty: 0 | Refills: 0 | Status: DISCONTINUED | OUTPATIENT
Start: 2017-06-17 | End: 2017-06-27

## 2017-06-17 RX ORDER — WARFARIN SODIUM 2.5 MG/1
2.5 TABLET ORAL ONCE
Qty: 0 | Refills: 0 | Status: COMPLETED | OUTPATIENT
Start: 2017-06-17 | End: 2017-06-17

## 2017-06-17 RX ORDER — HYDROMORPHONE HYDROCHLORIDE 2 MG/ML
0.5 INJECTION INTRAMUSCULAR; INTRAVENOUS; SUBCUTANEOUS ONCE
Qty: 0 | Refills: 0 | Status: DISCONTINUED | OUTPATIENT
Start: 2017-06-17 | End: 2017-06-17

## 2017-06-17 RX ORDER — POTASSIUM CHLORIDE 20 MEQ
10 PACKET (EA) ORAL
Qty: 0 | Refills: 0 | Status: COMPLETED | OUTPATIENT
Start: 2017-06-17 | End: 2017-06-17

## 2017-06-17 RX ORDER — FENTANYL CITRATE 50 UG/ML
50 INJECTION INTRAVENOUS ONCE
Qty: 0 | Refills: 0 | Status: DISCONTINUED | OUTPATIENT
Start: 2017-06-17 | End: 2017-06-17

## 2017-06-17 RX ORDER — LIDOCAINE HCL 20 MG/ML
100 VIAL (ML) INJECTION ONCE
Qty: 0 | Refills: 0 | Status: COMPLETED | OUTPATIENT
Start: 2017-06-17 | End: 2017-06-17

## 2017-06-17 RX ORDER — AMIODARONE HYDROCHLORIDE 400 MG/1
150 TABLET ORAL ONCE
Qty: 0 | Refills: 0 | Status: COMPLETED | OUTPATIENT
Start: 2017-06-17 | End: 2017-06-17

## 2017-06-17 RX ORDER — POTASSIUM CHLORIDE 20 MEQ
10 PACKET (EA) ORAL ONCE
Qty: 0 | Refills: 0 | Status: COMPLETED | OUTPATIENT
Start: 2017-06-17 | End: 2017-06-17

## 2017-06-17 RX ORDER — ALBUMIN HUMAN 25 %
250 VIAL (ML) INTRAVENOUS ONCE
Qty: 0 | Refills: 0 | Status: COMPLETED | OUTPATIENT
Start: 2017-06-17 | End: 2017-06-17

## 2017-06-17 RX ORDER — MAGNESIUM SULFATE 500 MG/ML
1 VIAL (ML) INJECTION ONCE
Qty: 0 | Refills: 0 | Status: COMPLETED | OUTPATIENT
Start: 2017-06-17 | End: 2017-06-17

## 2017-06-17 RX ORDER — VECURONIUM BROMIDE 20 MG/1
5 INJECTION, POWDER, FOR SOLUTION INTRAVENOUS ONCE
Qty: 0 | Refills: 0 | Status: COMPLETED | OUTPATIENT
Start: 2017-06-17 | End: 2017-06-17

## 2017-06-17 RX ADMIN — AMIODARONE HYDROCHLORIDE 400 MILLIGRAM(S): 400 TABLET ORAL at 21:38

## 2017-06-17 RX ADMIN — Medication 100 MILLIGRAM(S): at 21:31

## 2017-06-17 RX ADMIN — CHLORHEXIDINE GLUCONATE 15 MILLILITER(S): 213 SOLUTION TOPICAL at 17:21

## 2017-06-17 RX ADMIN — CHLORHEXIDINE GLUCONATE 15 MILLILITER(S): 213 SOLUTION TOPICAL at 05:35

## 2017-06-17 RX ADMIN — AMIODARONE HYDROCHLORIDE 600 MILLIGRAM(S): 400 TABLET ORAL at 19:10

## 2017-06-17 RX ADMIN — Medication 50 MILLIEQUIVALENT(S): at 01:30

## 2017-06-17 RX ADMIN — Medication 50 MILLIEQUIVALENT(S): at 20:39

## 2017-06-17 RX ADMIN — Medication 50 MILLIEQUIVALENT(S): at 00:15

## 2017-06-17 RX ADMIN — SODIUM CHLORIDE 10 MILLILITER(S): 9 INJECTION INTRAMUSCULAR; INTRAVENOUS; SUBCUTANEOUS at 07:00

## 2017-06-17 RX ADMIN — ARGATROBAN 0.9 MICROGRAM(S)/KG/MIN: 50 INJECTION, SOLUTION INTRAVENOUS at 07:00

## 2017-06-17 RX ADMIN — Medication 3.8 MG/HR: at 07:00

## 2017-06-17 RX ADMIN — VASOPRESSIN 4 UNIT(S)/MIN: 20 INJECTION INTRAVENOUS at 20:19

## 2017-06-17 RX ADMIN — HYDROMORPHONE HYDROCHLORIDE 0.5 MILLIGRAM(S): 2 INJECTION INTRAMUSCULAR; INTRAVENOUS; SUBCUTANEOUS at 09:25

## 2017-06-17 RX ADMIN — Medication 100 GRAM(S): at 20:19

## 2017-06-17 RX ADMIN — PROPOFOL 5 MICROGRAM(S)/KG/MIN: 10 INJECTION, EMULSION INTRAVENOUS at 20:20

## 2017-06-17 RX ADMIN — AMIODARONE HYDROCHLORIDE 600 MILLIGRAM(S): 400 TABLET ORAL at 04:08

## 2017-06-17 RX ADMIN — HYDROMORPHONE HYDROCHLORIDE 0.5 MILLIGRAM(S): 2 INJECTION INTRAMUSCULAR; INTRAVENOUS; SUBCUTANEOUS at 21:30

## 2017-06-17 RX ADMIN — Medication 50 MILLIEQUIVALENT(S): at 06:15

## 2017-06-17 RX ADMIN — AMIODARONE HYDROCHLORIDE 400 MILLIGRAM(S): 400 TABLET ORAL at 05:35

## 2017-06-17 RX ADMIN — Medication 2: at 14:10

## 2017-06-17 RX ADMIN — Medication 50 MILLIEQUIVALENT(S): at 09:15

## 2017-06-17 RX ADMIN — HYDROMORPHONE HYDROCHLORIDE 0.5 MILLIGRAM(S): 2 INJECTION INTRAMUSCULAR; INTRAVENOUS; SUBCUTANEOUS at 09:10

## 2017-06-17 RX ADMIN — MILRINONE LACTATE 8.44 MICROGRAM(S)/KG/MIN: 1 INJECTION, SOLUTION INTRAVENOUS at 20:19

## 2017-06-17 RX ADMIN — Medication 50 MILLIEQUIVALENT(S): at 10:15

## 2017-06-17 RX ADMIN — Medication 50 MILLIEQUIVALENT(S): at 08:45

## 2017-06-17 RX ADMIN — AMIODARONE HYDROCHLORIDE 16.67 MG/MIN: 400 TABLET ORAL at 20:20

## 2017-06-17 RX ADMIN — Medication 125 MILLILITER(S): at 21:39

## 2017-06-17 RX ADMIN — Medication 50 MILLIEQUIVALENT(S): at 06:48

## 2017-06-17 RX ADMIN — Medication 50 MILLIEQUIVALENT(S): at 08:15

## 2017-06-17 RX ADMIN — FENTANYL CITRATE 50 MICROGRAM(S): 50 INJECTION INTRAVENOUS at 11:15

## 2017-06-17 RX ADMIN — MEROPENEM 200 MILLIGRAM(S): 1 INJECTION INTRAVENOUS at 05:35

## 2017-06-17 RX ADMIN — ARGATROBAN 0.9 MICROGRAM(S)/KG/MIN: 50 INJECTION, SOLUTION INTRAVENOUS at 20:20

## 2017-06-17 RX ADMIN — PROPOFOL 5 MICROGRAM(S)/KG/MIN: 10 INJECTION, EMULSION INTRAVENOUS at 07:00

## 2017-06-17 RX ADMIN — Medication 50 MILLIEQUIVALENT(S): at 14:26

## 2017-06-17 RX ADMIN — Medication 50 MILLIEQUIVALENT(S): at 18:20

## 2017-06-17 RX ADMIN — VECURONIUM BROMIDE 5 MILLIGRAM(S): 20 INJECTION, POWDER, FOR SOLUTION INTRAVENOUS at 11:20

## 2017-06-17 RX ADMIN — PANTOPRAZOLE SODIUM 40 MILLIGRAM(S): 20 TABLET, DELAYED RELEASE ORAL at 21:39

## 2017-06-17 RX ADMIN — AMIODARONE HYDROCHLORIDE 600 MILLIGRAM(S): 400 TABLET ORAL at 06:39

## 2017-06-17 RX ADMIN — Medication 50 MILLIEQUIVALENT(S): at 13:25

## 2017-06-17 RX ADMIN — Medication 50 MILLIEQUIVALENT(S): at 02:00

## 2017-06-17 RX ADMIN — Medication 81 MILLIGRAM(S): at 12:59

## 2017-06-17 RX ADMIN — MEROPENEM 200 MILLIGRAM(S): 1 INJECTION INTRAVENOUS at 17:21

## 2017-06-17 RX ADMIN — Medication 2.5 MG/HR: at 20:20

## 2017-06-17 RX ADMIN — WARFARIN SODIUM 2.5 MILLIGRAM(S): 2.5 TABLET ORAL at 21:38

## 2017-06-17 RX ADMIN — HYDROMORPHONE HYDROCHLORIDE 0.5 MILLIGRAM(S): 2 INJECTION INTRAMUSCULAR; INTRAVENOUS; SUBCUTANEOUS at 21:49

## 2017-06-17 RX ADMIN — ARGATROBAN 0.9 MICROGRAM(S)/KG/MIN: 50 INJECTION, SOLUTION INTRAVENOUS at 01:55

## 2017-06-17 RX ADMIN — Medication 50 MILLIEQUIVALENT(S): at 17:50

## 2017-06-17 RX ADMIN — AMIODARONE HYDROCHLORIDE 400 MILLIGRAM(S): 400 TABLET ORAL at 14:09

## 2017-06-17 RX ADMIN — AMIODARONE HYDROCHLORIDE 16.67 MG/MIN: 400 TABLET ORAL at 07:00

## 2017-06-17 RX ADMIN — FENTANYL CITRATE 50 MICROGRAM(S): 50 INJECTION INTRAVENOUS at 11:30

## 2017-06-17 RX ADMIN — Medication 100 GRAM(S): at 11:15

## 2017-06-17 RX ADMIN — MILRINONE LACTATE 8.44 MICROGRAM(S)/KG/MIN: 1 INJECTION, SOLUTION INTRAVENOUS at 07:00

## 2017-06-17 RX ADMIN — Medication 50 MILLIEQUIVALENT(S): at 14:00

## 2017-06-17 NOTE — PROGRESS NOTE ADULT - ASSESSMENT
67 year old man with ACC/AHA stage D congestive heart failure with severely reduced LV systolic function due to a nonischemic dilated cardiomyopathy s/p HM2 LVAD on 6/12 with post-operative course complicated by hemodynamically but self-terminating ventricular tachycardia. He continues on amiodarone for recurrent VT and his inotropes and vasopressors are gradually being weaned. 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 67 year old man with ACC/AHA stage D congestive heart failure with severely reduced LV systolic function due to a nonischemic dilated cardiomyopathy s/p HM2 LVAD on 6/12 with post-operative course complicated by hemodynamically but self-terminating ventricular tachycardia and acute renal failure. He continues on amiodarone for recurrent VT and his inotropes are gradually being weaned. 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 His renal function has stabilized and he is off of renal replacement therapy.

## 2017-06-17 NOTE — PROCEDURE NOTE - NSPOSTCAREGUIDE_GEN_A_CORE
Instructed patient/caregiver to follow-up with primary care physician
Care for catheter as per unit/ICU protocols

## 2017-06-17 NOTE — PROGRESS NOTE ADULT - PROBLEM SELECTOR PLAN 4
Creatinine appears to have plateaued, likely related to ATN. Adequate u/o. Creatinine appears to have plateaued, likely related to ATN.   Urine output adequate

## 2017-06-17 NOTE — PROCEDURE NOTE - NSSITEPREP_SKIN_A_CORE
chlorhexidine
chlorhexidine
Adherence to aseptic technique: hand hygiene prior to donning barriers (gown, gloves), don cap and mask, sterile drape over patient/chlorhexidine

## 2017-06-17 NOTE — PROGRESS NOTE ADULT - PROBLEM SELECTOR PLAN 1
hemodynamically mediated in setting of decompensated heart failure +/- urinary retention component and hypotension.  SCr has been stable with good urine output. Patient remains net negative. Will continue to monitor OFF CRRT.  Monitor BMP  Strict I/O, avoid nephrotoxics, NSAIDs, RCA. Renal dose of medications.

## 2017-06-17 NOTE — PROCEDURE NOTE - NSPOSTPRCRAD_GEN_A_CORE
central line located in the/no pneumothorax/depth of insertion/line adjusted to depth of insertion/central line located in the superior vena cava

## 2017-06-17 NOTE — PROGRESS NOTE ADULT - PROBLEM SELECTOR PLAN 3
RPMs at 9200.   No VAD alarms or significant events. No programming changes.  Continue aspirin 81 mg daily.  On argatroban with aPTT 65-87 (goal 50-70)  1st dose of coumadin last night.   Give coumadin 2.5 mg tonight.   Currently therpeutic on argatroban.   Start coumadin 2.5 mg tonight.   On argatroban gtt. RPMs at 9200.   No VAD alarms or significant events. No programming changes.  Continue aspirin 81 mg daily.  On argatroban with aPTT 65-87 (goal 50-70)  Give coumadin 2.5 mg tonight.

## 2017-06-17 NOTE — PROGRESS NOTE ADULT - PROBLEM SELECTOR PLAN 1
Doing well with decreasing CVP on current regimen. Doing well with normal RAP.   Aim for slightly net negative fluid balance over course of day

## 2017-06-17 NOTE — PROGRESS NOTE ADULT - SUBJECTIVE AND OBJECTIVE BOX
[facility] DIVISION OF KIDNEY DISEASES AND HYPERTENSION -- FOLLOW UP NOTE  --------------------------------------------------------------------------------  Chief Complaint: CRS    24 hour events/subjective:  Patient seen and examined. Maintaining good urine output off CRRT. Patient confirmed to be negative for HIT. Remains intubated/sedated.      PAST HISTORY  --------------------------------------------------------------------------------  No significant changes to PMH, PSH, FHx, SHx, unless otherwise noted    ALLERGIES & MEDICATIONS  --------------------------------------------------------------------------------  Allergies    No Known Allergies    Intolerances      Standing Inpatient Medications  sodium chloride 0.9%. 1000milliLiter(s) IV Continuous <Continuous>  docusate sodium 100milliGRAM(s) Oral three times a day  dextrose 5%. 1000milliLiter(s) IV Continuous <Continuous>  dextrose 50% Injectable 25Gram(s) IV Push once  chlorhexidine 0.12% Liquid 15milliLiter(s) Swish and Spit two times a day  propofol Infusion 11.111MICROgram(s)/kG/Min IV Continuous <Continuous>  amiodarone Infusion 0.5mG/Min IV Continuous <Continuous>  amiodarone    Tablet 400milliGRAM(s) Oral every 8 hours  vasopressin Infusion 0.067Unit(s)/Min IV Continuous <Continuous>  pantoprazole  Injectable 40milliGRAM(s) IV Push every 24 hours  furosemide Infusion 7.6mG/Hr IV Continuous <Continuous>  argatroban Infusion 0.2MICROgram(s)/kG/Min IV Continuous <Continuous>  meropenem IVPB 500milliGRAM(s) IV Intermittent every 12 hours  insulin lispro (HumaLOG) corrective regimen sliding scale  SubCutaneous every 4 hours  milrinone Infusion 0.375MICROgram(s)/kG/Min IV Continuous <Continuous>    PRN Inpatient Medications      REVIEW OF SYSTEMS  --------------------------------------------------------------------------------  Unable to obtain    VITALS/PHYSICAL EXAM  --------------------------------------------------------------------------------  T(C): 37.4, Max: 38.1 (06-16 @ 20:00)  HR: 96 (61 - 111)  BP: --  RR: 16 (5 - 25)  SpO2: 100% (86% - 100%)  Wt(kg): --      I & Os for 24h ending 06-17-17 @ 07:00  =============================================  IN: 4604.9 ml / OUT: 5810 ml / NET: -1205.1 ml    I & Os for current day (as of 06-17-17 @ 08:49)  =============================================  IN: 101.1 ml / OUT: 170 ml / NET: -68.9 ml    Physical Exam:  	Gen: male, NAD  	HEENT: +ETT  	Pulm: Mechanical breath sounds  	CV: LVAD  	Abd: Soft, +BS  	Ext: No LE edema B/L                      Neuro: Sedated  	Skin: Warm and Dry   	Other : + diaz, +LVAD      LABS/STUDIES  --------------------------------------------------------------------------------              8.6    13.9  >-----------<  52       [06-17-17 @ 03:06]              26.8     130  |  93  |  41  ----------------------------<  113      [06-17-17 @ 03:06]  4.3   |  22  |  2.60        Ca     7.9     [06-17-17 @ 03:06]      Mg     2.7     [06-15-17 @ 20:43]      Phos  3.8     [06-16-17 @ 01:54]    TPro  5.8  /  Alb  3.0  /  TBili  5.5  /  DBili  x   /  AST  83  /  ALT  54  /  AlkPhos  94  [06-17-17 @ 03:06]    PT/INR: PT 19.1 , INR 1.73       [06-17-17 @ 03:06]  PTT: 57.9       [06-17-17 @ 08:28]          [06-17-17 @ 03:06]    Creatinine Trend:  SCr 2.60 [06-17 @ 03:06]  SCr 2.69 [06-16 @ 13:12]  SCr 2.63 [06-16 @ 01:54]  SCr 2.63 [06-15 @ 14:10]  SCr 2.47 [06-15 @ 06:02]              Urinalysis - [06-16-17 @ 04:51]      Color Yellow / Appearance Clear / SG 1.008 / pH 5.5      Gluc Negative / Ketone Negative  / Bili Negative / Urobili Negative       Blood Small / Protein Negative / Leuk Est Negative / Nitrite Negative      RBC 0-2 / WBC 3-5 / Hyaline 0-2 / Gran  / Sq Epi  / Non Sq Epi OCC / Bacteria       Iron 20, TIBC 352, %sat 6      [06-08-17 @ 07:14]  Ferritin 121.0      [06-09-17 @ 19:08]  HbA1c 5.5      [06-07-17 @ 06:14]  TSH 2.33      [06-14-17 @ 14:51]  Lipid: chol 62, TG 33, HDL 17, LDL 38      [06-07-17 @ 06:14] [facility] DIVISION OF KIDNEY DISEASES AND HYPERTENSION -- FOLLOW UP NOTE  --------------------------------------------------------------------------------  Chief Complaint: CRS    24 hour events/subjective:  Patient seen and examined. Maintaining good urine output off CRRT. Patient confirmed to be negative for HIT. Remains intubated/sedated.      PAST HISTORY  --------------------------------------------------------------------------------  No significant changes to PMH, PSH, FHx, SHx, unless otherwise noted    ALLERGIES & MEDICATIONS  --------------------------------------------------------------------------------  Allergies    No Known Allergies    Intolerances      Standing Inpatient Medications  sodium chloride 0.9%. 1000milliLiter(s) IV Continuous <Continuous>  docusate sodium 100milliGRAM(s) Oral three times a day  dextrose 5%. 1000milliLiter(s) IV Continuous <Continuous>  dextrose 50% Injectable 25Gram(s) IV Push once  chlorhexidine 0.12% Liquid 15milliLiter(s) Swish and Spit two times a day  propofol Infusion 11.111MICROgram(s)/kG/Min IV Continuous <Continuous>  amiodarone Infusion 0.5mG/Min IV Continuous <Continuous>  amiodarone    Tablet 400milliGRAM(s) Oral every 8 hours  vasopressin Infusion 0.067Unit(s)/Min IV Continuous <Continuous>  pantoprazole  Injectable 40milliGRAM(s) IV Push every 24 hours  furosemide Infusion 7.6mG/Hr IV Continuous <Continuous>  argatroban Infusion 0.2MICROgram(s)/kG/Min IV Continuous <Continuous>  meropenem IVPB 500milliGRAM(s) IV Intermittent every 12 hours  insulin lispro (HumaLOG) corrective regimen sliding scale  SubCutaneous every 4 hours  milrinone Infusion 0.375MICROgram(s)/kG/Min IV Continuous <Continuous>    PRN Inpatient Medications      REVIEW OF SYSTEMS  --------------------------------------------------------------------------------  Unable to obtain    VITALS/PHYSICAL EXAM  --------------------------------------------------------------------------------  T(C): 37.4, Max: 38.1 (06-16 @ 20:00)  HR: 96 (61 - 111)  BP: --  RR: 16 (5 - 25)  SpO2: 100% (86% - 100%)  Wt(kg): --      I & Os for 24h ending 06-17-17 @ 07:00  =============================================  IN: 4604.9 ml / OUT: 5810 ml / NET: -1205.1 ml    I & Os for current day (as of 06-17-17 @ 08:49)  =============================================  IN: 101.1 ml / OUT: 170 ml / NET: -68.9 ml    Physical Exam:  	Gen: male, NAD  	HEENT: +ETT  	Pulm: Mechanical breath sounds  	CV: LVAD  	Abd: Soft, +BS  	Ext: +1 b/l LE edema                      Neuro: Sedated  	Skin: Warm and Dry   	Other : + diaz, +LVAD      LABS/STUDIES  --------------------------------------------------------------------------------              8.6    13.9  >-----------<  52       [06-17-17 @ 03:06]              26.8     130  |  93  |  41  ----------------------------<  113      [06-17-17 @ 03:06]  4.3   |  22  |  2.60        Ca     7.9     [06-17-17 @ 03:06]      Mg     2.7     [06-15-17 @ 20:43]      Phos  3.8     [06-16-17 @ 01:54]    TPro  5.8  /  Alb  3.0  /  TBili  5.5  /  DBili  x   /  AST  83  /  ALT  54  /  AlkPhos  94  [06-17-17 @ 03:06]    PT/INR: PT 19.1 , INR 1.73       [06-17-17 @ 03:06]  PTT: 57.9       [06-17-17 @ 08:28]          [06-17-17 @ 03:06]    Creatinine Trend:  SCr 2.60 [06-17 @ 03:06]  SCr 2.69 [06-16 @ 13:12]  SCr 2.63 [06-16 @ 01:54]  SCr 2.63 [06-15 @ 14:10]  SCr 2.47 [06-15 @ 06:02]              Urinalysis - [06-16-17 @ 04:51]      Color Yellow / Appearance Clear / SG 1.008 / pH 5.5      Gluc Negative / Ketone Negative  / Bili Negative / Urobili Negative       Blood Small / Protein Negative / Leuk Est Negative / Nitrite Negative      RBC 0-2 / WBC 3-5 / Hyaline 0-2 / Gran  / Sq Epi  / Non Sq Epi OCC / Bacteria       Iron 20, TIBC 352, %sat 6      [06-08-17 @ 07:14]  Ferritin 121.0      [06-09-17 @ 19:08]  HbA1c 5.5      [06-07-17 @ 06:14]  TSH 2.33      [06-14-17 @ 14:51]  Lipid: chol 62, TG 33, HDL 17, LDL 38      [06-07-17 @ 06:14]

## 2017-06-17 NOTE — PROGRESS NOTE ADULT - PROBLEM SELECTOR PLAN 2
Currrently milrinone at 0.375 mcg/kg/min. Lactic acid normal. Currrently milrinone at 0.375 mcg/kg/min. Lactic acid normal.  Continue milrinone at current dose.

## 2017-06-17 NOTE — PROCEDURE NOTE - NSPROCDETAILS_GEN_ALL_CORE
sterile technique, catheter placed/lumen(s) aspirated and flushed/sterile dressing applied/ultrasound guidance/guidewire recovered
lumen(s) aspirated and flushed/guidewire recovered/sterile dressing applied/sterile technique, catheter placed/ultrasound guidance

## 2017-06-17 NOTE — PROCEDURE NOTE - NSINDICATIONS_GEN_A_CORE
arterial puncture to obtain ABG's/blood sampling/monitoring purposes/critical patient
critical illness/venous access/volume resuscitation/hemodynamic monitoring
critical illness/dialysis/CRRT

## 2017-06-17 NOTE — PROGRESS NOTE ADULT - SUBJECTIVE AND OBJECTIVE BOX
CRITICAL CARE ATTENDING - CTICU    MEDICATIONS  (STANDING):  sodium chloride 0.9%. 1000milliLiter(s) IV Continuous <Continuous>  docusate sodium 100milliGRAM(s) Oral three times a day  dextrose 5%. 1000milliLiter(s) IV Continuous <Continuous>  dextrose 50% Injectable 25Gram(s) IV Push once  chlorhexidine 0.12% Liquid 15milliLiter(s) Swish and Spit two times a day  propofol Infusion 11.111MICROgram(s)/kG/Min IV Continuous <Continuous>  amiodarone Infusion 0.5mG/Min IV Continuous <Continuous>  amiodarone    Tablet 400milliGRAM(s) Oral every 8 hours  vasopressin Infusion 0.067Unit(s)/Min IV Continuous <Continuous>  pantoprazole  Injectable 40milliGRAM(s) IV Push every 24 hours  furosemide Infusion 7.6mG/Hr IV Continuous <Continuous>  argatroban Infusion 0.2MICROgram(s)/kG/Min IV Continuous <Continuous>  meropenem IVPB 500milliGRAM(s) IV Intermittent every 12 hours  insulin lispro (HumaLOG) corrective regimen sliding scale  SubCutaneous every 4 hours  milrinone Infusion 0.375MICROgram(s)/kG/Min IV Continuous <Continuous>  aspirin  chewable 81milliGRAM(s) Oral daily  warfarin 2.5milliGRAM(s) Oral once  potassium chloride  10 mEq/50 mL IVPB 10milliEquivalent(s) IV Intermittent every 1 hour                                    8.6    13.9  )-----------( 52       ( 2017 03:06 )             26.8       06-17    130<L>  |  93<L>  |  41<H>  ----------------------------<  113<H>  4.3   |  22  |  2.60<H>    Ca    7.9<L>      2017 03:06  Phos  3.8     06-16  Mg     2.7     06-15    TPro  5.8<L>  /  Alb  3.0<L>  /  TBili  5.5<H>  /  DBili  x   /  AST  83<H>  /  ALT  54<H>  /  AlkPhos  94  06-17      PT/INR - ( 2017 03:06 )   PT: 19.1 sec;   INR: 1.73 ratio         PTT - ( 2017 08:28 )  PTT:57.9 sec    Mode: AC/ CMV (Assist Control/ Continuous Mandatory Ventilation)  RR (machine): 10  TV (machine): 600  FiO2: 50  PEEP: 5  ITime: 1  MAP: 7  PIP: 11      Daily     Daily Weight in k.1 (2017 00:00)    I & Os for 24h ending  @ 07:00  =============================================  IN: 4604.9 ml / OUT: 5810 ml / NET: -1205.1 ml    I & Os for current day (as of  @ 11:03)  =============================================  IN: 595.7 ml / OUT: 630 ml / NET: -34.3 ml      Critically Ill patient  : [ ] preoperative ,   [x ] post operative    Requires :  [x ] Arterial Line   [x Central Line  [x ] PA catheter  [ ] IABP  [ ] ECMO[  x ] LVAD  [x ] Ventilator  [x ] pacemaker [ ] Impella.[x] nitric oxide    Bedside evaluation , monitoring , treatment of hemodynamics , fluids , IVP/ IVCD meds.        Diagnosis:     POD 7 LVAD    Cardiogenic Shock     CHF- acute [x ]   chronic [ ]    systolic [x ]   diatolic [ ]          - Echo- EF - 10            [ ] RV dysfunction          - Cxr-cardiomegally, edema          - Clinical-  [x ]inotropes   [x ]pressors   [x ]diuresis   [ ]IABP   [ ]ECMO   [x ]LVAD   [x ]Respiratory Failure    Hemodynamic lability,instability. Requires IVCD [x ] vasopressors [x ] inotropes  [ ] vasodilator  [ ]IVSS fluid  to maintain MAP, perfusion, C.I.     respiratory failure     Renal Failure     Requires chest PT, pulmonary toilet, ambu bagging, suctioning to maintain SaO2,  patent airway and treat atelectasis.     Ventilator Management:  [xAC-rest    [x]CPAP-PS Wean    [ ]Trach Collar     [ ]Extubate    [ ] T-Piece  [ ]peep>5     fluid overload     Present, assisting, supervising:  [ ] intubation  [x ] A-Line  [x ] central line  [ ] [place] Cassidy catheter , [ ] chest tube [ ] Pig Tail  [ ] Shiley  [ ] KALEIGH  [ ] Bronchoscopy   [ ] IABP  [ ] hemodialysis/filtration  [ ] CVVH-D  [ ]sono-site evaluation [ ] IABP          B[place] Cassidy catheter interpretation and therapeutic management of unstable hemodynamics     CT drainage    Fever 38.1 - culture blood, urine, sputum - IV antibiotics - line change - f/u RUQ sono, c/w chf, acalculous cholycystitis ?    A Fib    s/p AICD-PPM     VT / VF    Thrombocytopenia, doubt HIT, suspect DIC/blood loss/ bypass    LVAD circuit               -               Discussed with CT surgeon, Physician's Assistant - Nurse Practitioner- Critical care medicine team.   Dicussed at  AM / PM rounds.   Chart, labs , films reviewed.    Total Time: 180 min

## 2017-06-17 NOTE — PROGRESS NOTE ADULT - PROBLEM SELECTOR PLAN 6
Platelets increasing.   Preliminary HIT negative, await confirmatory testing.  Avoid heparin and c/w argatroban. Platelets increasing.   Preliminary HIT negative, await confirmatory testing.

## 2017-06-18 DIAGNOSIS — J96.00 ACUTE RESPIRATORY FAILURE, UNSPECIFIED WHETHER WITH HYPOXIA OR HYPERCAPNIA: ICD-10-CM

## 2017-06-18 LAB
ALBUMIN SERPL ELPH-MCNC: 2.9 G/DL — LOW (ref 3.3–5)
ALP SERPL-CCNC: 109 U/L — SIGNIFICANT CHANGE UP (ref 40–120)
ALT FLD-CCNC: 48 U/L RC — HIGH (ref 10–45)
ANION GAP SERPL CALC-SCNC: 10 MMOL/L — SIGNIFICANT CHANGE UP (ref 5–17)
APTT BLD: 51.6 SEC — HIGH (ref 27.5–37.4)
APTT BLD: 55.3 SEC — HIGH (ref 27.5–37.4)
APTT BLD: 92.3 SEC — HIGH (ref 27.5–37.4)
AST SERPL-CCNC: 79 U/L — HIGH (ref 10–40)
BILIRUB SERPL-MCNC: 5.1 MG/DL — HIGH (ref 0.2–1.2)
BUN SERPL-MCNC: 41 MG/DL — HIGH (ref 7–23)
CALCIUM SERPL-MCNC: 7.9 MG/DL — LOW (ref 8.4–10.5)
CHLORIDE SERPL-SCNC: 96 MMOL/L — SIGNIFICANT CHANGE UP (ref 96–108)
CO2 SERPL-SCNC: 23 MMOL/L — SIGNIFICANT CHANGE UP (ref 22–31)
CREAT SERPL-MCNC: 2.64 MG/DL — HIGH (ref 0.5–1.3)
CULTURE RESULTS: SIGNIFICANT CHANGE UP
GAS PNL BLDA: SIGNIFICANT CHANGE UP
GLUCOSE SERPL-MCNC: 111 MG/DL — HIGH (ref 70–99)
GRAM STN FLD: SIGNIFICANT CHANGE UP
HAPTOGLOB SERPL-MCNC: 86 MG/DL — SIGNIFICANT CHANGE UP (ref 34–200)
HCT VFR BLD CALC: 23.7 % — LOW (ref 39–50)
HCT VFR BLD CALC: 28.3 % — LOW (ref 39–50)
HGB BLD-MCNC: 7.6 G/DL — LOW (ref 13–17)
HGB BLD-MCNC: 9.2 G/DL — LOW (ref 13–17)
INR BLD: 1.31 RATIO — HIGH (ref 0.88–1.16)
INR BLD: 1.51 RATIO — HIGH (ref 0.88–1.16)
LDH SERPL L TO P-CCNC: 580 U/L — HIGH (ref 50–242)
MCHC RBC-ENTMCNC: 29.7 PG — SIGNIFICANT CHANGE UP (ref 27–34)
MCHC RBC-ENTMCNC: 30.6 PG — SIGNIFICANT CHANGE UP (ref 27–34)
MCHC RBC-ENTMCNC: 32.3 GM/DL — SIGNIFICANT CHANGE UP (ref 32–36)
MCHC RBC-ENTMCNC: 32.5 GM/DL — SIGNIFICANT CHANGE UP (ref 32–36)
MCV RBC AUTO: 92.2 FL — SIGNIFICANT CHANGE UP (ref 80–100)
MCV RBC AUTO: 94.1 FL — SIGNIFICANT CHANGE UP (ref 80–100)
PLATELET # BLD AUTO: 100 K/UL — LOW (ref 150–400)
PLATELET # BLD AUTO: 81 K/UL — LOW (ref 150–400)
POTASSIUM SERPL-MCNC: 4.7 MMOL/L — SIGNIFICANT CHANGE UP (ref 3.5–5.3)
POTASSIUM SERPL-SCNC: 4.7 MMOL/L — SIGNIFICANT CHANGE UP (ref 3.5–5.3)
PROT SERPL-MCNC: 5.6 G/DL — LOW (ref 6–8.3)
PROTHROM AB SERPL-ACNC: 14.4 SEC — HIGH (ref 9.8–12.7)
PROTHROM AB SERPL-ACNC: 16.4 SEC — HIGH (ref 9.8–12.7)
RBC # BLD: 2.57 M/UL — LOW (ref 4.2–5.8)
RBC # BLD: 3.01 M/UL — LOW (ref 4.2–5.8)
RBC # FLD: 21.3 % — HIGH (ref 10.3–14.5)
RBC # FLD: 22.5 % — HIGH (ref 10.3–14.5)
SODIUM SERPL-SCNC: 129 MMOL/L — LOW (ref 135–145)
SPECIMEN SOURCE: SIGNIFICANT CHANGE UP
SPECIMEN SOURCE: SIGNIFICANT CHANGE UP
VANCOMYCIN TROUGH SERPL-MCNC: 15.3 UG/ML — SIGNIFICANT CHANGE UP (ref 10–20)
WBC # BLD: 12.3 K/UL — HIGH (ref 3.8–10.5)
WBC # BLD: 14.8 K/UL — HIGH (ref 3.8–10.5)
WBC # FLD AUTO: 12.3 K/UL — HIGH (ref 3.8–10.5)
WBC # FLD AUTO: 14.8 K/UL — HIGH (ref 3.8–10.5)

## 2017-06-18 PROCEDURE — 99292 CRITICAL CARE ADDL 30 MIN: CPT

## 2017-06-18 PROCEDURE — 99291 CRITICAL CARE FIRST HOUR: CPT

## 2017-06-18 PROCEDURE — 93750 INTERROGATION VAD IN PERSON: CPT

## 2017-06-18 PROCEDURE — 71010: CPT | Mod: 26,77

## 2017-06-18 PROCEDURE — 71010: CPT | Mod: 26

## 2017-06-18 PROCEDURE — 99232 SBSQ HOSP IP/OBS MODERATE 35: CPT

## 2017-06-18 PROCEDURE — 99233 SBSQ HOSP IP/OBS HIGH 50: CPT | Mod: 25

## 2017-06-18 RX ORDER — POTASSIUM CHLORIDE 20 MEQ
10 PACKET (EA) ORAL
Qty: 0 | Refills: 0 | Status: COMPLETED | OUTPATIENT
Start: 2017-06-18 | End: 2017-06-18

## 2017-06-18 RX ORDER — WARFARIN SODIUM 2.5 MG/1
2.5 TABLET ORAL ONCE
Qty: 0 | Refills: 0 | Status: COMPLETED | OUTPATIENT
Start: 2017-06-18 | End: 2017-06-18

## 2017-06-18 RX ORDER — HEPARIN SODIUM 5000 [USP'U]/ML
800 INJECTION INTRAVENOUS; SUBCUTANEOUS
Qty: 25000 | Refills: 0 | Status: DISCONTINUED | OUTPATIENT
Start: 2017-06-18 | End: 2017-06-20

## 2017-06-18 RX ORDER — POTASSIUM CHLORIDE 20 MEQ
10 PACKET (EA) ORAL ONCE
Qty: 0 | Refills: 0 | Status: COMPLETED | OUTPATIENT
Start: 2017-06-18 | End: 2017-06-18

## 2017-06-18 RX ORDER — VANCOMYCIN HCL 1 G
1000 VIAL (EA) INTRAVENOUS ONCE
Qty: 0 | Refills: 0 | Status: COMPLETED | OUTPATIENT
Start: 2017-06-18 | End: 2017-06-18

## 2017-06-18 RX ORDER — CALCIUM GLUCONATE 100 MG/ML
1 VIAL (ML) INTRAVENOUS ONCE
Qty: 1 | Refills: 0 | Status: COMPLETED | OUTPATIENT
Start: 2017-06-18 | End: 2017-06-18

## 2017-06-18 RX ORDER — LACTULOSE 10 G/15ML
10 SOLUTION ORAL ONCE
Qty: 0 | Refills: 0 | Status: COMPLETED | OUTPATIENT
Start: 2017-06-18 | End: 2017-06-18

## 2017-06-18 RX ORDER — MAGNESIUM SULFATE 500 MG/ML
1 VIAL (ML) INJECTION ONCE
Qty: 0 | Refills: 0 | Status: COMPLETED | OUTPATIENT
Start: 2017-06-18 | End: 2017-06-18

## 2017-06-18 RX ORDER — ACETAMINOPHEN 500 MG
1000 TABLET ORAL ONCE
Qty: 0 | Refills: 0 | Status: COMPLETED | OUTPATIENT
Start: 2017-06-18 | End: 2017-06-18

## 2017-06-18 RX ORDER — METOCLOPRAMIDE HCL 10 MG
5 TABLET ORAL ONCE
Qty: 0 | Refills: 0 | Status: COMPLETED | OUTPATIENT
Start: 2017-06-18 | End: 2017-06-18

## 2017-06-18 RX ORDER — HYDROMORPHONE HYDROCHLORIDE 2 MG/ML
0.5 INJECTION INTRAMUSCULAR; INTRAVENOUS; SUBCUTANEOUS ONCE
Qty: 0 | Refills: 0 | Status: DISCONTINUED | OUTPATIENT
Start: 2017-06-18 | End: 2017-06-18

## 2017-06-18 RX ADMIN — CHLORHEXIDINE GLUCONATE 15 MILLILITER(S): 213 SOLUTION TOPICAL at 18:00

## 2017-06-18 RX ADMIN — Medication 200 GRAM(S): at 22:45

## 2017-06-18 RX ADMIN — Medication 2: at 09:48

## 2017-06-18 RX ADMIN — HEPARIN SODIUM 8 UNIT(S)/HR: 5000 INJECTION INTRAVENOUS; SUBCUTANEOUS at 09:58

## 2017-06-18 RX ADMIN — MEROPENEM 200 MILLIGRAM(S): 1 INJECTION INTRAVENOUS at 18:01

## 2017-06-18 RX ADMIN — AMIODARONE HYDROCHLORIDE 400 MILLIGRAM(S): 400 TABLET ORAL at 14:13

## 2017-06-18 RX ADMIN — Medication 400 MILLIGRAM(S): at 09:44

## 2017-06-18 RX ADMIN — Medication 81 MILLIGRAM(S): at 14:13

## 2017-06-18 RX ADMIN — LACTULOSE 10 GRAM(S): 10 SOLUTION ORAL at 03:19

## 2017-06-18 RX ADMIN — HEPARIN SODIUM 6.5 UNIT(S)/HR: 5000 INJECTION INTRAVENOUS; SUBCUTANEOUS at 17:03

## 2017-06-18 RX ADMIN — Medication 1000 MILLIGRAM(S): at 10:16

## 2017-06-18 RX ADMIN — Medication 5 MILLIGRAM(S): at 09:23

## 2017-06-18 RX ADMIN — Medication 100 MILLIEQUIVALENT(S): at 00:44

## 2017-06-18 RX ADMIN — WARFARIN SODIUM 2.5 MILLIGRAM(S): 2.5 TABLET ORAL at 23:00

## 2017-06-18 RX ADMIN — Medication 50 MILLIEQUIVALENT(S): at 09:24

## 2017-06-18 RX ADMIN — Medication 100 MILLIEQUIVALENT(S): at 22:44

## 2017-06-18 RX ADMIN — AMIODARONE HYDROCHLORIDE 400 MILLIGRAM(S): 400 TABLET ORAL at 05:50

## 2017-06-18 RX ADMIN — AMIODARONE HYDROCHLORIDE 16.67 MG/MIN: 400 TABLET ORAL at 22:04

## 2017-06-18 RX ADMIN — MEROPENEM 200 MILLIGRAM(S): 1 INJECTION INTRAVENOUS at 05:50

## 2017-06-18 RX ADMIN — CHLORHEXIDINE GLUCONATE 15 MILLILITER(S): 213 SOLUTION TOPICAL at 05:50

## 2017-06-18 RX ADMIN — Medication 100 GRAM(S): at 15:44

## 2017-06-18 RX ADMIN — Medication 250 MILLIGRAM(S): at 09:24

## 2017-06-18 RX ADMIN — Medication 100 MILLIEQUIVALENT(S): at 20:00

## 2017-06-18 RX ADMIN — Medication 100 MILLIEQUIVALENT(S): at 19:00

## 2017-06-18 RX ADMIN — Medication 50 MILLIEQUIVALENT(S): at 09:48

## 2017-06-18 RX ADMIN — Medication 2.5 MG/HR: at 22:04

## 2017-06-18 RX ADMIN — AMIODARONE HYDROCHLORIDE 400 MILLIGRAM(S): 400 TABLET ORAL at 22:06

## 2017-06-18 RX ADMIN — HYDROMORPHONE HYDROCHLORIDE 0.5 MILLIGRAM(S): 2 INJECTION INTRAMUSCULAR; INTRAVENOUS; SUBCUTANEOUS at 04:15

## 2017-06-18 RX ADMIN — HYDROMORPHONE HYDROCHLORIDE 0.5 MILLIGRAM(S): 2 INJECTION INTRAMUSCULAR; INTRAVENOUS; SUBCUTANEOUS at 04:00

## 2017-06-18 RX ADMIN — HEPARIN SODIUM 6.5 UNIT(S)/HR: 5000 INJECTION INTRAVENOUS; SUBCUTANEOUS at 22:03

## 2017-06-18 RX ADMIN — MILRINONE LACTATE 8.44 MICROGRAM(S)/KG/MIN: 1 INJECTION, SOLUTION INTRAVENOUS at 22:04

## 2017-06-18 RX ADMIN — Medication 100 MILLIEQUIVALENT(S): at 18:39

## 2017-06-18 RX ADMIN — PANTOPRAZOLE SODIUM 40 MILLIGRAM(S): 20 TABLET, DELAYED RELEASE ORAL at 22:07

## 2017-06-18 NOTE — AIRWAY REMOVAL NOTE  ADULT & PEDS - ARTIFICAL AIRWAY REMOVAL COMMENTS
Written order for extubation verified. The patient was identified by full name and birth date compared to the identification band. Present during the procedure was rica Noble

## 2017-06-18 NOTE — PROGRESS NOTE ADULT - PROBLEM SELECTOR PLAN 4
No programming changes.  Continue aspirin 81 mg daily.  On argatroban with aPTT 65-87 (goal 50-70).  Give coumadin 2.5 mg tonight

## 2017-06-18 NOTE — PROGRESS NOTE ADULT - SUBJECTIVE AND OBJECTIVE BOX
CRITICAL CARE ATTENDING - CTICU    MEDICATIONS  (STANDING):  sodium chloride 0.9%. 1000milliLiter(s) IV Continuous <Continuous>  docusate sodium 100milliGRAM(s) Oral three times a day  dextrose 5%. 1000milliLiter(s) IV Continuous <Continuous>  dextrose 50% Injectable 25Gram(s) IV Push once  chlorhexidine 0.12% Liquid 15milliLiter(s) Swish and Spit two times a day  propofol Infusion 11.111MICROgram(s)/kG/Min IV Continuous <Continuous>  amiodarone Infusion 0.5mG/Min IV Continuous <Continuous>  amiodarone    Tablet 400milliGRAM(s) Oral every 8 hours  vasopressin Infusion 0.067Unit(s)/Min IV Continuous <Continuous>  pantoprazole  Injectable 40milliGRAM(s) IV Push every 24 hours  furosemide Infusion 5mG/Hr IV Continuous <Continuous>  meropenem IVPB 500milliGRAM(s) IV Intermittent every 12 hours  insulin lispro (HumaLOG) corrective regimen sliding scale  SubCutaneous every 4 hours  milrinone Infusion 0.375MICROgram(s)/kG/Min IV Continuous <Continuous>  aspirin  chewable 81milliGRAM(s) Oral daily  heparin  Infusion 800Unit(s)/Hr IV Continuous <Continuous>                                    7.6    12.3  )-----------( 81       ( 2017 00:20 )             23.7       06-18    129<L>  |  96  |  41<H>  ----------------------------<  111<H>  4.7   |  23  |  2.64<H>    Ca    7.9<L>      2017 00:20    TPro  5.6<L>  /  Alb  2.9<L>  /  TBili  5.1<H>  /  DBili  x   /  AST  79<H>  /  ALT  48<H>  /  AlkPhos  109  -18      PT/INR - ( 2017 00:20 )   PT: 16.4 sec;   INR: 1.51 ratio         PTT - ( 2017 07:17 )  PTT:51.6 sec    Mode: CPAP with PS  FiO2: 50  PEEP: 5  PS: 5  MAP: 11  PIP: 16      Daily     Daily Weight in k.4 (2017 00:00)    I & Os for 24h ending  @ 07:00  =============================================  IN: 4090.5 ml / OUT: 3690 ml / NET: 400.4 ml    I & Os for current day (as of  @ 10:44)  =============================================  IN: 763.7 ml / OUT: 260 ml / NET: 503.7 ml      Critically Ill patient  : [ ] preoperative ,   [x ] post operative    Requires :  [x ] Arterial Line   [x] Central Line  [ ] PA catheter  [ ] IABP  [ ] ECMO  [x ] LVAD  [ ] Ventilator  [x ] pacemaker [ ] Impella.    Bedside evaluation , monitoring , treatment of hemodynamics , fluids , IVP/ IVCD meds.        Diagnosis    Cardiogenic Shock - resolving    CHF- acute [x ]   chronic [x]    systolic [x]   diatolic [ ]          - Echo- EF -10           [x ] RV dysfunction          - Cxr-cardiomegally, edema          - Clinical-  [x ]inotropes   [x ]pressors   [x ]diuresis   [ ]IABP   [ ]ECMO   [x ]LVAD   [x ]Respiratory Failure    Ventilator Management:  [x ]AC-rest    [xCPAP-PS Wean    [ ]Trach Collar     [x ]Extubate    [ ] T-Piece  [ ]peep>5     Hemodynamic lability,instability. Requires IVCD [x] vasopressors [x ] inotropes  [ ] vasodilator  [ ]IVSS fluid  to maintain MAP, perfusion, C.I.     fluid overload     requires IVCD anticoagulation for LVAD, A Fib.    VT / VF    Fever yesterday - s/p line change and cultures    Requires chest PT, pulmonary toilet, ambu bagging, suctioning to maintain SaO2,  patent airway and treat atelectasis.     Requires bedside physical therapy, mobilization and total alf care.     s/p AICD / PPM    LVAD circuit    Resolving thrombocytopenia - HIT negative - d/c argatroban, restart heparin.                    -                  Discussed with CT surgeon, Physician's Assistant - Nurse Practitioner- Critical care medicine team.   Dicussed at  AM / PM rounds.   Chart, labs , films reviewed.    Total Time: 180 min

## 2017-06-18 NOTE — PROGRESS NOTE ADULT - SUBJECTIVE AND OBJECTIVE BOX
He remains intubated. More interactive this morning. No twitching movements. No other acute events.     Medications:  sodium chloride 0.9%. 1000milliLiter(s) IV Continuous <Continuous>  docusate sodium 100milliGRAM(s) Oral three times a day  chlorhexidine 0.12% Liquid 15milliLiter(s) Swish and Spit two times a day  propofol Infusion 11.111MICROgram(s)/kG/Min IV Continuous <Continuous>  amiodarone Infusion 0.5mG/Min IV Continuous <Continuous>  amiodarone    Tablet 400milliGRAM(s) Oral every 8 hours  vasopressin Infusion 0.067Unit(s)/Min IV Continuous <Continuous>  pantoprazole  Injectable 40milliGRAM(s) IV Push every 24 hours  furosemide Infusion 5mG/Hr IV Continuous <Continuous>  argatroban Infusion 0.2MICROgram(s)/kG/Min IV Continuous <Continuous>  meropenem IVPB 500milliGRAM(s) IV Intermittent every 12 hours  insulin lispro (HumaLOG) corrective regimen sliding scale  SubCutaneous every 4 hours  milrinone Infusion 0.375MICROgram(s)/kG/Min IV Continuous <Continuous>  aspirin  chewable 81milliGRAM(s) Oral daily  vancomycin  IVPB 1000milliGRAM(s) IV Intermittent once      Vital Signs Last 24 Hrs  T(C): 36.7, Max: 37.5 (17 @ 19:00)  T(F): 98.1, Max: 99.5 (17 @ 19:00)  HR: 90 (70 - 108)  BP: --  BP(mean): --  RR: 18 (8 - 38)  SpO2: 100% (78% - 100%)    Daily Weight in k.4 (2017 00:00)    I&O's Summary    I & Os for current day (as of 2017 07:59)  =============================================  IN: 4090.5 ml / OUT: 3690 ml / NET: 400.4 ml    I&O's Detail    I & Os for current day (as of 2017 07:59)  =============================================  IN:    IV PiggyBack: 1250 ml    Nepro with Carb Steady: 1100 ml    amiodarone Infusion: 434 ml    Albumin 5%  - 250 mL: 250 ml    Miscellaneous Tube Feedin ml    sodium chloride 0.9%.: 240 ml    milrinone  Infusion: 201.6 ml    propofol Infusion: 172.6 ml    Enteral Tube Flush: 90 ml    furosemide Infusion: 45.6 ml    vasopressin Infusion: 24 ml    furosemide Infusion: 20 ml    argatroban Infusion: 16.7 ml    Total IN: 4090.5 ml  ---------------------------------------------  OUT:    Indwelling Catheter - Urethral: 3280 ml    Chest Tube: 330 ml    Chest Tube: 80 ml    Total OUT: 3690 ml  ---------------------------------------------  Total NET: 400.4 ml      Physical Exam:     General: No distress. Comfortable.  HEENT: EOM intact.  Neck: Neck supple. JVP not elevated. No masses.   Chest: Clear to auscultation bilaterally.  CV: Typical LVAD sounds. No distal pulses. No significant edema.  Abdomen: Soft, non-distended, non-tender  Driveline exit site: Clean, dry, and intact  Skin: No rashes or skin breakdown  Neurology: Difficult to assess as partially sedated  Psych: Difficult to assess as intubated    LVAD Interrogation: Heart Mate II  RPMs: 9200  Power: 5.2  Flow: 4.6  PI: 5.8  Event: No events  No programming changes were made    Labs:                        7.6    12.3  )-----------( 81       ( 2017 00:20 )             23.7     06-18    129<L>  |  96  |  41<H>  ----------------------------<  111<H>  4.7   |  23  |  2.64<H>    Ca    7.9<L>      2017 00:20    TPro  5.6<L>  /  Alb  2.9<L>  /  TBili  5.1<H>  /  DBili  x   /  AST  79<H>  /  ALT  48<H>  /  AlkPhos  109      PT/INR - ( 2017 00:20 )   PT: 16.4 sec;   INR: 1.51 ratio         PTT - ( 2017 07:17 )  PTT:51.6 sec      Oxygen Saturation, Mixed: 73 % ( @ 03:03)  Oxygen Saturation, Mixed: 63 % ( @ 20:00)  Oxygen Saturation, Mixed: 61 % ( @ 14:40)      Lactate Dehydrogenase, Serum: 580 U/L ( @ 00:20)  Lactate Dehydrogenase, Serum: 687 U/L ( @ 03:06)  Lactate Dehydrogenase, Serum: 814 U/L ( @ 01:54)

## 2017-06-18 NOTE — PROGRESS NOTE ADULT - PROBLEM SELECTOR PLAN 1
hemodynamically mediated in setting of decompensated heart failure +/- urinary retention component and hypotension.  SCr has been stable with good urine output. Pt net positive ~400mL overnight, 3.6L UOP total. Will continue to monitor OFF CRRT.  Monitor BMP  Strict I/O, avoid nephrotoxics, NSAIDs, RCA. Renally dose medications.

## 2017-06-18 NOTE — PROGRESS NOTE ADULT - SUBJECTIVE AND OBJECTIVE BOX
[facility] DIVISION OF KIDNEY DISEASES AND HYPERTENSION -- FOLLOW UP NOTE  --------------------------------------------------------------------------------  Chief Complaint: CRS    24 hour events/subjective:  Patient seen and examined. Lying in bed, unable to elicit history. No distress.      PAST HISTORY  --------------------------------------------------------------------------------  No significant changes to PMH, PSH, FHx, SHx, unless otherwise noted    ALLERGIES & MEDICATIONS  --------------------------------------------------------------------------------  Allergies    No Known Allergies    Intolerances      Standing Inpatient Medications  sodium chloride 0.9%. 1000milliLiter(s) IV Continuous <Continuous>  docusate sodium 100milliGRAM(s) Oral three times a day  dextrose 5%. 1000milliLiter(s) IV Continuous <Continuous>  dextrose 50% Injectable 25Gram(s) IV Push once  chlorhexidine 0.12% Liquid 15milliLiter(s) Swish and Spit two times a day  propofol Infusion 11.111MICROgram(s)/kG/Min IV Continuous <Continuous>  amiodarone Infusion 0.5mG/Min IV Continuous <Continuous>  amiodarone    Tablet 400milliGRAM(s) Oral every 8 hours  vasopressin Infusion 0.067Unit(s)/Min IV Continuous <Continuous>  pantoprazole  Injectable 40milliGRAM(s) IV Push every 24 hours  furosemide Infusion 5mG/Hr IV Continuous <Continuous>  meropenem IVPB 500milliGRAM(s) IV Intermittent every 12 hours  insulin lispro (HumaLOG) corrective regimen sliding scale  SubCutaneous every 4 hours  milrinone Infusion 0.375MICROgram(s)/kG/Min IV Continuous <Continuous>  aspirin  chewable 81milliGRAM(s) Oral daily  vancomycin  IVPB 1000milliGRAM(s) IV Intermittent once  heparin  Infusion 800Unit(s)/Hr IV Continuous <Continuous>  metoclopramide Injectable 5milliGRAM(s) IV Push once  potassium chloride  10 mEq/50 mL IVPB 10milliEquivalent(s) IV Intermittent every 1 hour    PRN Inpatient Medications      REVIEW OF SYSTEMS  --------------------------------------------------------------------------------  Unable to obtain    All other systems were reviewed and are negative, except as noted.    VITALS/PHYSICAL EXAM  --------------------------------------------------------------------------------  T(C): 36.9, Max: 37.5 (06-17 @ 19:00)  HR: 89 (70 - 108)  BP: --  RR: 14 (8 - 38)  SpO2: 100% (78% - 100%)  Wt(kg): --      I & Os for 24h ending 06-18-17 @ 07:00  =============================================  IN: 4090.5 ml / OUT: 3690 ml / NET: 400.4 ml    I & Os for current day (as of 06-18-17 @ 09:24)  =============================================  IN: 88.5 ml / OUT: 90 ml / NET: -1.5 ml    Physical Exam:  	Gen: male, NAD  	HEENT: +ETT  	Pulm: Mechanical breath sounds  	CV: LVAD  	Abd: Soft, +BS  	Ext: +1 b/l LE edema                      Neuro: Sedated  	Skin: Warm and Dry   	Other : + diaz, +LVAD    LABS/STUDIES  --------------------------------------------------------------------------------              7.6    12.3  >-----------<  81       [06-18-17 @ 00:20]              23.7     129  |  96  |  41  ----------------------------<  111      [06-18-17 @ 00:20]  4.7   |  23  |  2.64        Ca     7.9     [06-18-17 @ 00:20]    TPro  5.6  /  Alb  2.9  /  TBili  5.1  /  DBili  x   /  AST  79  /  ALT  48  /  AlkPhos  109  [06-18-17 @ 00:20]    PT/INR: PT 16.4 , INR 1.51       [06-18-17 @ 00:20]  PTT: 51.6       [06-18-17 @ 07:17]          [06-18-17 @ 00:20]    Creatinine Trend:  SCr 2.64 [06-18 @ 00:20]  SCr 2.60 [06-17 @ 03:06]  SCr 2.69 [06-16 @ 13:12]  SCr 2.63 [06-16 @ 01:54]  SCr 2.63 [06-15 @ 14:10]    Urinalysis - [06-16-17 @ 04:51]      Color Yellow / Appearance Clear / SG 1.008 / pH 5.5      Gluc Negative / Ketone Negative  / Bili Negative / Urobili Negative       Blood Small / Protein Negative / Leuk Est Negative / Nitrite Negative      RBC 0-2 / WBC 3-5 / Hyaline 0-2 / Gran  / Sq Epi  / Non Sq Epi OCC / Bacteria       Iron 20, TIBC 352, %sat 6      [06-08-17 @ 07:14]  Ferritin 121.0      [06-09-17 @ 19:08]  HbA1c 5.5      [06-07-17 @ 06:14]  TSH 2.33      [06-14-17 @ 14:51]  Lipid: chol 62, TG 33, HDL 17, LDL 38      [06-07-17 @ 06:14]

## 2017-06-18 NOTE — PROGRESS NOTE ADULT - ASSESSMENT
67 year old man with ACC/AHA stage D congestive heart failure with severely reduced LV systolic function due to a nonischemic dilated cardiomyopathy s/p HM2 LVAD on 6/12 with post-operative course complicated by hemodynamically but self-terminating ventricular tachycardia and acute renal failure. He continues on amiodarone for recurrent VT and his inotropes are gradually being weaned. 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 His renal function has stabilized and he is off of renal replacement therapy.

## 2017-06-19 LAB
ALBUMIN SERPL ELPH-MCNC: 2.8 G/DL — LOW (ref 3.3–5)
ALBUMIN SERPL ELPH-MCNC: 2.8 G/DL — LOW (ref 3.3–5)
ALP SERPL-CCNC: 155 U/L — HIGH (ref 40–120)
ALP SERPL-CCNC: 172 U/L — HIGH (ref 40–120)
ALT FLD-CCNC: 52 U/L RC — HIGH (ref 10–45)
ALT FLD-CCNC: 54 U/L RC — HIGH (ref 10–45)
ANION GAP SERPL CALC-SCNC: 14 MMOL/L — SIGNIFICANT CHANGE UP (ref 5–17)
APPEARANCE UR: ABNORMAL
APTT BLD: 51.5 SEC — HIGH (ref 27.5–37.4)
APTT BLD: 60.9 SEC — HIGH (ref 27.5–37.4)
APTT BLD: 63 SEC — HIGH (ref 27.5–37.4)
AST SERPL-CCNC: 100 U/L — HIGH (ref 10–40)
AST SERPL-CCNC: 102 U/L — HIGH (ref 10–40)
BACTERIA # UR AUTO: NEGATIVE /HPF — SIGNIFICANT CHANGE UP
BILIRUB DIRECT SERPL-MCNC: 2.8 MG/DL — HIGH (ref 0–0.2)
BILIRUB INDIRECT FLD-MCNC: 1.4 MG/DL — HIGH (ref 0.2–1)
BILIRUB SERPL-MCNC: 4.2 MG/DL — HIGH (ref 0.2–1.2)
BILIRUB SERPL-MCNC: 4.6 MG/DL — HIGH (ref 0.2–1.2)
BILIRUB UR-MCNC: NEGATIVE — SIGNIFICANT CHANGE UP
BUN SERPL-MCNC: 44 MG/DL — HIGH (ref 7–23)
CALCIUM SERPL-MCNC: 8.3 MG/DL — LOW (ref 8.4–10.5)
CHLORIDE SERPL-SCNC: 97 MMOL/L — SIGNIFICANT CHANGE UP (ref 96–108)
CO2 SERPL-SCNC: 19 MMOL/L — LOW (ref 22–31)
COLOR SPEC: YELLOW — SIGNIFICANT CHANGE UP
CREAT SERPL-MCNC: 2.39 MG/DL — HIGH (ref 0.5–1.3)
CULTURE RESULTS: NO GROWTH — SIGNIFICANT CHANGE UP
DIFF PNL FLD: ABNORMAL
EPI CELLS # UR: SIGNIFICANT CHANGE UP /HPF
GAS PNL BLDA: SIGNIFICANT CHANGE UP
GLUCOSE SERPL-MCNC: 113 MG/DL — HIGH (ref 70–99)
GLUCOSE UR QL: NEGATIVE — SIGNIFICANT CHANGE UP
HAPTOGLOB SERPL-MCNC: 107 MG/DL — SIGNIFICANT CHANGE UP (ref 34–200)
HCT VFR BLD CALC: 29.1 % — LOW (ref 39–50)
HGB BLD-MCNC: 9.4 G/DL — LOW (ref 13–17)
HYALINE CASTS # UR AUTO: ABNORMAL
INR BLD: 1.09 RATIO — SIGNIFICANT CHANGE UP (ref 0.88–1.16)
KETONES UR-MCNC: NEGATIVE — SIGNIFICANT CHANGE UP
LDH SERPL L TO P-CCNC: 609 U/L — HIGH (ref 50–242)
LEUKOCYTE ESTERASE UR-ACNC: NEGATIVE — SIGNIFICANT CHANGE UP
MAGNESIUM SERPL-MCNC: 2.9 MG/DL — HIGH (ref 1.6–2.6)
MCHC RBC-ENTMCNC: 30.5 PG — SIGNIFICANT CHANGE UP (ref 27–34)
MCHC RBC-ENTMCNC: 32.3 GM/DL — SIGNIFICANT CHANGE UP (ref 32–36)
MCV RBC AUTO: 94.2 FL — SIGNIFICANT CHANGE UP (ref 80–100)
NITRITE UR-MCNC: NEGATIVE — SIGNIFICANT CHANGE UP
PH UR: 6 — SIGNIFICANT CHANGE UP (ref 5–8)
PLATELET # BLD AUTO: 126 K/UL — LOW (ref 150–400)
POTASSIUM SERPL-MCNC: 4.4 MMOL/L — SIGNIFICANT CHANGE UP (ref 3.5–5.3)
POTASSIUM SERPL-SCNC: 4.4 MMOL/L — SIGNIFICANT CHANGE UP (ref 3.5–5.3)
PROT SERPL-MCNC: 6.1 G/DL — SIGNIFICANT CHANGE UP (ref 6–8.3)
PROT SERPL-MCNC: 6.1 G/DL — SIGNIFICANT CHANGE UP (ref 6–8.3)
PROT UR-MCNC: 30 MG/DL
PROTHROM AB SERPL-ACNC: 11.8 SEC — SIGNIFICANT CHANGE UP (ref 9.8–12.7)
RBC # BLD: 3.09 M/UL — LOW (ref 4.2–5.8)
RBC # FLD: 21.9 % — HIGH (ref 10.3–14.5)
RBC CASTS # UR COMP ASSIST: ABNORMAL /HPF (ref 0–2)
SODIUM SERPL-SCNC: 130 MMOL/L — LOW (ref 135–145)
SP GR SPEC: 1.01 — SIGNIFICANT CHANGE UP (ref 1.01–1.02)
SPECIMEN SOURCE: SIGNIFICANT CHANGE UP
UROBILINOGEN FLD QL: 1
WBC # BLD: 16.1 K/UL — HIGH (ref 3.8–10.5)
WBC # FLD AUTO: 16.1 K/UL — HIGH (ref 3.8–10.5)
WBC UR QL: SIGNIFICANT CHANGE UP /HPF (ref 0–5)

## 2017-06-19 PROCEDURE — 93750 INTERROGATION VAD IN PERSON: CPT

## 2017-06-19 PROCEDURE — 99233 SBSQ HOSP IP/OBS HIGH 50: CPT | Mod: GC

## 2017-06-19 PROCEDURE — 99291 CRITICAL CARE FIRST HOUR: CPT

## 2017-06-19 PROCEDURE — 99292 CRITICAL CARE ADDL 30 MIN: CPT

## 2017-06-19 PROCEDURE — 99233 SBSQ HOSP IP/OBS HIGH 50: CPT | Mod: 25,GC

## 2017-06-19 PROCEDURE — 76705 ECHO EXAM OF ABDOMEN: CPT | Mod: 26,RT

## 2017-06-19 PROCEDURE — 71010: CPT | Mod: 26

## 2017-06-19 RX ORDER — POTASSIUM CHLORIDE 20 MEQ
10 PACKET (EA) ORAL ONCE
Qty: 0 | Refills: 0 | Status: COMPLETED | OUTPATIENT
Start: 2017-06-19 | End: 2017-06-19

## 2017-06-19 RX ORDER — ALBUMIN HUMAN 25 %
250 VIAL (ML) INTRAVENOUS ONCE
Qty: 0 | Refills: 0 | Status: COMPLETED | OUTPATIENT
Start: 2017-06-19 | End: 2017-06-19

## 2017-06-19 RX ORDER — AMIODARONE HYDROCHLORIDE 400 MG/1
200 TABLET ORAL DAILY
Qty: 0 | Refills: 0 | Status: DISCONTINUED | OUTPATIENT
Start: 2017-06-19 | End: 2017-07-17

## 2017-06-19 RX ORDER — AMIODARONE HYDROCHLORIDE 400 MG/1
200 TABLET ORAL DAILY
Qty: 0 | Refills: 0 | Status: DISCONTINUED | OUTPATIENT
Start: 2017-06-19 | End: 2017-06-19

## 2017-06-19 RX ORDER — VANCOMYCIN HCL 1 G
1000 VIAL (EA) INTRAVENOUS EVERY 24 HOURS
Qty: 0 | Refills: 0 | Status: DISCONTINUED | OUTPATIENT
Start: 2017-06-20 | End: 2017-06-20

## 2017-06-19 RX ORDER — VANCOMYCIN HCL 1 G
VIAL (EA) INTRAVENOUS
Qty: 0 | Refills: 0 | Status: DISCONTINUED | OUTPATIENT
Start: 2017-06-19 | End: 2017-06-20

## 2017-06-19 RX ORDER — VANCOMYCIN HCL 1 G
1000 VIAL (EA) INTRAVENOUS ONCE
Qty: 0 | Refills: 0 | Status: COMPLETED | OUTPATIENT
Start: 2017-06-19 | End: 2017-06-19

## 2017-06-19 RX ORDER — WARFARIN SODIUM 2.5 MG/1
3 TABLET ORAL ONCE
Qty: 0 | Refills: 0 | Status: COMPLETED | OUTPATIENT
Start: 2017-06-19 | End: 2017-06-19

## 2017-06-19 RX ORDER — POTASSIUM CHLORIDE 20 MEQ
40 PACKET (EA) ORAL ONCE
Qty: 0 | Refills: 0 | Status: COMPLETED | OUTPATIENT
Start: 2017-06-19 | End: 2017-06-19

## 2017-06-19 RX ADMIN — Medication 100 MILLIGRAM(S): at 14:32

## 2017-06-19 RX ADMIN — CHLORHEXIDINE GLUCONATE 15 MILLILITER(S): 213 SOLUTION TOPICAL at 18:11

## 2017-06-19 RX ADMIN — MEROPENEM 200 MILLIGRAM(S): 1 INJECTION INTRAVENOUS at 05:33

## 2017-06-19 RX ADMIN — Medication 250 MILLIGRAM(S): at 07:02

## 2017-06-19 RX ADMIN — Medication 100 MILLIEQUIVALENT(S): at 03:00

## 2017-06-19 RX ADMIN — CHLORHEXIDINE GLUCONATE 15 MILLILITER(S): 213 SOLUTION TOPICAL at 05:33

## 2017-06-19 RX ADMIN — Medication 81 MILLIGRAM(S): at 14:32

## 2017-06-19 RX ADMIN — PANTOPRAZOLE SODIUM 40 MILLIGRAM(S): 20 TABLET, DELAYED RELEASE ORAL at 22:15

## 2017-06-19 RX ADMIN — Medication 40 MILLIEQUIVALENT(S): at 16:30

## 2017-06-19 RX ADMIN — MILRINONE LACTATE 8.44 MICROGRAM(S)/KG/MIN: 1 INJECTION, SOLUTION INTRAVENOUS at 22:16

## 2017-06-19 RX ADMIN — Medication 250 MILLILITER(S): at 10:01

## 2017-06-19 RX ADMIN — MEROPENEM 200 MILLIGRAM(S): 1 INJECTION INTRAVENOUS at 18:11

## 2017-06-19 RX ADMIN — WARFARIN SODIUM 3 MILLIGRAM(S): 2.5 TABLET ORAL at 22:15

## 2017-06-19 RX ADMIN — Medication 100 MILLIGRAM(S): at 22:15

## 2017-06-19 RX ADMIN — Medication 100 MILLIEQUIVALENT(S): at 04:00

## 2017-06-19 RX ADMIN — AMIODARONE HYDROCHLORIDE 200 MILLIGRAM(S): 400 TABLET ORAL at 06:43

## 2017-06-19 NOTE — PHYSICAL THERAPY INITIAL EVALUATION ADULT - PERTINENT HX OF CURRENT PROBLEM, REHAB EVAL
66y/o M with advanced NICM BiV HFrEF 15-20% on a stable dose of milrinone at 3mcg/kg/min via PICC s/p AICD, with replacement from [facility] to [employer] (2009), PAF on AC and h/o VT, with recent admission for AICD firing, now presenting with dizziness and nausea/vomiting X 1 Day. He says that he was doing well earlier in the day and felt like his usual self. Normally he only walks from his living room to the front steps and this afternoon he decided to walk around a city block, continued..
67 year old man with ACC/AHA stage D congestive heart failure with severely reduced LV systolic function due to a nonischemic dilated cardiomyopathy s/p HM2 LVAD on 6/12 with post-operative course complicated by hemodynamically but self-terminating ventricular tachycardia and acute renal failure.

## 2017-06-19 NOTE — PROGRESS NOTE ADULT - SUBJECTIVE AND OBJECTIVE BOX
Interval History:    Extubated, OOB to chair & feeling well, NO weaned off.    Medications:  sodium chloride 0.9%. 1000milliLiter(s) IV Continuous <Continuous>  docusate sodium 100milliGRAM(s) Oral three times a day  dextrose 5%. 1000milliLiter(s) IV Continuous <Continuous>  dextrose 50% Injectable 25Gram(s) IV Push once  chlorhexidine 0.12% Liquid 15milliLiter(s) Swish and Spit two times a day  pantoprazole  Injectable 40milliGRAM(s) IV Push every 24 hours  furosemide Infusion 5mG/Hr IV Continuous <Continuous>  meropenem IVPB 500milliGRAM(s) IV Intermittent every 12 hours  insulin lispro (HumaLOG) corrective regimen sliding scale  SubCutaneous every 4 hours  milrinone Infusion 0.375MICROgram(s)/kG/Min IV Continuous <Continuous>  aspirin  chewable 81milliGRAM(s) Oral daily  heparin  Infusion 800Unit(s)/Hr IV Continuous <Continuous>  amiodarone    Tablet 200milliGRAM(s) Oral daily  vancomycin  IVPB  IV Intermittent     Vitals:  T(C): 38.2, Max: 38.4 ( @ 03:00)  HR: 96 (92 - 109)  BP: --  BP(mean): --  ABP: 85/73 (63/58 - 98/85)  ABP(mean): 79 (60 - 92)  RR: 20 (15 - 28)  SpO2: 100% (95% - 100%)  Wt(kg): --    Daily     Daily Weight in k.8 (2017 06:00)        I&O's Summary  I & Os for 24h ending 2017 07:00  =============================================  IN: 3578.2 ml / OUT: 3130 ml / NET: 448.2 ml    I & Os for current day (as of 2017 11:58)  =============================================  IN: 304.6 ml / OUT: 335 ml / NET: -30.4 ml      Physical Exam:  Appearance: No Acute Distress  HEENT: No JVD  Cardiovascular: Audible S1 S2, + LVAD hum  Respiratory: Clear to auscultation bilaterally  Gastrointestinal: Soft, Non-tender	  Skin: No cyanosis	  Neurologic: Non-focal  Extremities: No LE edema  Psychiatry: A & O x 3, Mood & affect appropriate    LVAD Interrogation: HeartMate II  Pump Flow: 5.2  Pump Speed: 9200  Pulse Index: 4.4  Pump Power: 5.6  VAD Events: No PI events in 24hrs  Driveline evaluation: Non-tender, dressing w/ old, dried blood  Programming Changes: No changes made    Labs:                        9.4    16.1  )-----------( 126      ( 2017 00:36 )             29.1         130<L>  |  97  |  44<H>  ----------------------------<  113<H>  4.4   |  19<L>  |  2.39<H>    Ca    8.3<L>      2017 00:36  Mg     2.9         TPro  6.1  /  Alb  2.8<L>  /  TBili  4.2<H>  /  DBili  2.8<H>  /  AST  100<H>  /  ALT  52<H>  /  AlkPhos  155<H>      PT/INR - ( 2017 16:16 )   PT: 14.4 sec;   INR: 1.31 ratio         PTT - ( 2017 06:27 )  PTT:63.0 sec    Oxygen Saturation, Mixed: 73 % ( @ 03:03)  Oxygen Saturation, Mixed: 63 % ( @ 20:00)  Oxygen Saturation, Mixed: 61 % ( @ 14:40)  Oxygen Saturation, Mixed: 58 % ( @ 13:24)  Oxygen Saturation, Mixed: 51 % ( @ 13:05)    Lactate Dehydrogenase, Serum: 609 U/L ( @ 00:36)  Lactate Dehydrogenase, Serum: 580 U/L ( @ 00:20)  Lactate Dehydrogenase, Serum: 687 U/L ( @ 03:06) Interval History:    Extubated, OOB to chair & feeling well, NO weaned off.    Medications:  sodium chloride 0.9%. 1000milliLiter(s) IV Continuous <Continuous>  docusate sodium 100milliGRAM(s) Oral three times a day  dextrose 5%. 1000milliLiter(s) IV Continuous <Continuous>  dextrose 50% Injectable 25Gram(s) IV Push once  chlorhexidine 0.12% Liquid 15milliLiter(s) Swish and Spit two times a day  pantoprazole  Injectable 40milliGRAM(s) IV Push every 24 hours  furosemide Infusion 5mG/Hr IV Continuous <Continuous>  meropenem IVPB 500milliGRAM(s) IV Intermittent every 12 hours  insulin lispro (HumaLOG) corrective regimen sliding scale  SubCutaneous every 4 hours  milrinone Infusion 0.375MICROgram(s)/kG/Min IV Continuous <Continuous>  aspirin  chewable 81milliGRAM(s) Oral daily  heparin  Infusion 800Unit(s)/Hr IV Continuous <Continuous>  amiodarone    Tablet 200milliGRAM(s) Oral daily  vancomycin  IVPB  IV Intermittent     Vitals:  T(C): 38.2, Max: 38.4 ( @ 03:00)  HR: 96 (92 - 109)  ABP: 85/73 (63/58 - 98/85)  ABP(mean): 79 (60 - 92)  RR: 20 (15 - 28)  SpO2: 100% (95% - 100%)    Daily     Daily Weight in k.8 (2017 06:00)        I&O's Summary  I & Os for 24h ending 2017 07:00  =============================================  IN: 3578.2 ml / OUT: 3130 ml / NET: 448.2 ml    I & Os for current day (as of 2017 11:58)  =============================================  IN: 304.6 ml / OUT: 335 ml / NET: -30.4 ml      Physical Exam:  Appearance: No Acute Distress  HEENT: No JVD  Cardiovascular: Audible S1 S2, + LVAD hum  Respiratory: Clear to auscultation bilaterally  Gastrointestinal: Soft, Non-tender	  Skin: No cyanosis	  Neurologic: Non-focal  Extremities: No LE edema  Psychiatry: A & O x 3, Mood & affect appropriate    LVAD Interrogation: HeartMate II  Pump Flow: 5.2  Pump Speed: 9200  Pulse Index: 4.4  Pump Power: 5.6  VAD Events: No PI events in 24hrs  Driveline evaluation: Non-tender, dressing w/ old, dried blood  Programming Changes: No changes made    Labs:                        9.4    16.1  )-----------( 126      ( 2017 00:36 )             29.1         130<L>  |  97  |  44<H>  ----------------------------<  113<H>  4.4   |  19<L>  |  2.39<H>    Ca    8.3<L>      2017 00:36  Mg     2.9         TPro  6.1  /  Alb  2.8<L>  /  TBili  4.2<H>  /  DBili  2.8<H>  /  AST  100<H>  /  ALT  52<H>  /  AlkPhos  155<H>      PT/INR - ( 2017 16:16 )   PT: 14.4 sec;   INR: 1.31 ratio         PTT - ( 2017 06:27 )  PTT:63.0 sec    Oxygen Saturation, Mixed: 73 % ( @ 03:03)  Oxygen Saturation, Mixed: 63 % ( @ 20:00)  Oxygen Saturation, Mixed: 61 % ( @ 14:40)  Oxygen Saturation, Mixed: 58 % ( @ 13:24)  Oxygen Saturation, Mixed: 51 % ( @ 13:05)    Lactate Dehydrogenase, Serum: 609 U/L ( @ 00:36)  Lactate Dehydrogenase, Serum: 580 U/L ( @ 00:20)  Lactate Dehydrogenase, Serum: 687 U/L ( @ 03:06)

## 2017-06-19 NOTE — PROGRESS NOTE ADULT - PROBLEM SELECTOR PLAN 7
Leukocytosis improving and cultures remain negative  He continues on empiric meropenem and vancomycin Leukocytosis improving and cultures remain negative, but with persistent low grade fevers  He continues on empiric meropenem and vancomycin

## 2017-06-19 NOTE — PROGRESS NOTE ADULT - ATTENDING COMMENTS
NORMAN on CKD  Creatinine stable and good UO  Has edema  continue diuresis, no acute need for RRT NORMAN on CKD  renal function improving with  good UO ( on lasix drip )   continue diuresis, no acute need for RRT

## 2017-06-19 NOTE — PHYSICAL THERAPY INITIAL EVALUATION ADULT - GAIT DEVIATIONS NOTED, PT EVAL
decreased step length/decreased stride length/decreased marleny/increased time in double stance
decreased velocity of limb motion/decreased step length/decreased weight-shifting ability/decreased marleny

## 2017-06-19 NOTE — PHYSICAL THERAPY INITIAL EVALUATION ADULT - GENERAL OBSERVATIONS, REHAB EVAL
NAD in sidelying to the left, (+)R arm PICC line
Sitting in chair, +VAD, IJ, Chest tube x 1, A-line, CPV and Rosas

## 2017-06-19 NOTE — CHART NOTE - NSCHARTNOTEFT_GEN_A_CORE
Pt with CHF, s/p LVAD (heartmate II); NORMAN- now being monitored off CVVHD; fluid overload; diffuse multifocal cerebral dysfunction, pt extubated yesterday as per chart.     Source: Patient [ ]    Family [ ]     other [ x ] Pt seen and d/w team during CTU rounds    Diet : Nepro at goal rate 50ml/hr provides 2160kcal and 97gm prot (29kcal/kg and 1.3gm prot/kg per dosing wt 75kg).    Per d/w plan to initiate PO diet as pt passed dysphagia screen.     Per RN pt with small BM overnight after being given lactulose for no BMs.      Admission Weight:72.9kg  Current Weight: 77.4kg  Weight fluctuations noted, likely due to fluid shifts. Noted pt with 2+bilateral leg edema.     Pertinent Medications: MEDICATIONS  (STANDING):  sodium chloride 0.9%. 1000milliLiter(s) IV Continuous <Continuous>  docusate sodium 100milliGRAM(s) Oral three times a day  dextrose 5%. 1000milliLiter(s) IV Continuous <Continuous>  dextrose 50% Injectable 25Gram(s) IV Push once  chlorhexidine 0.12% Liquid 15milliLiter(s) Swish and Spit two times a day  vasopressin Infusion 0.067Unit(s)/Min IV Continuous <Continuous>  pantoprazole  Injectable 40milliGRAM(s) IV Push every 24 hours  furosemide Infusion 5mG/Hr IV Continuous <Continuous>  meropenem IVPB 500milliGRAM(s) IV Intermittent every 12 hours  insulin lispro (HumaLOG) corrective regimen sliding scale  SubCutaneous every 4 hours  milrinone Infusion 0.375MICROgram(s)/kG/Min IV Continuous <Continuous>  aspirin  chewable 81milliGRAM(s) Oral daily  heparin  Infusion 800Unit(s)/Hr IV Continuous <Continuous>  amiodarone    Tablet 200milliGRAM(s) Oral daily  vancomycin  IVPB  IV Intermittent     Per d/w team, coumadin dosed daily.     MEDICATIONS  (PRN):    Pertinent Labs:  Hemoglobin: 9.4 g/dL (06.19.17 @ 00:36) <L>  Hematocrit: 29.1 % (06.19.17 @ 00:36) <L>  Comprehensive Metabolic Panel (06.19.17 @ 00:36)    Sodium, Serum: 130: TEST REPEATED. mmol/L <L>    Potassium, Serum: 4.4 mmol/L     Chloride, Serum: 97 mmol/L    Blood Urea Nitrogen, Serum: 44 mg/dL <H>    Creatinine, Serum: 2.39 mg/dL <H>    Glucose, Serum: 113 mg/dL <H>    Calcium, Total Serum: 8.3 mg/dL <L>    Protein Total, Serum: 6.1 g/dL     Albumin, Serum: 2.8 g/dL <L>    Bilirubin Total, Serum: 4.6 mg/dL <H>    Alkaline Phosphatase, Serum: 172 U/L <H>    Aspartate Aminotransferase (AST/SGOT): 102 U/L <H>    Alanine Aminotransferase (ALT/SGPT): 54 U/L RC <H>  Magnesium, Serum: 2.9 mg/dL (06.19.17 @ 00:36)  Phosphorus Level, Serum: 3.8 mg/dL (06.16.17 @ 01:54)    Arterial Line Glucose: (6/19)117,106, (6/18)110-128    Skin: No pressure ulcers noted.    Estimated Needs:   [ x ] no change since previous assessment  [ ] recalculated:       Previous Nutrition Diagnosis:    Increased nutrient needs   Malnutrition          Nutrition Diagnosis is [ x ] ongoing  [ ] resolved [ ] not applicable   Being addressed with enteral nutrition support.            Interventions:     Recommend    [ x ] Change Diet To: Regular, Low sodium    [ x ] Nutrition Supplement- Ensure Pudding 3 times/day    [ x ] Nutrition Support- Recommend continue Nepro at goal rate 50ml/hr; if pt with good PO intake, recommend d/c enteral nutrition support    [ ] Other:        Monitoring and Evaluation:     [ x ] PO intake [ x ] Tolerance to diet prescription [ x ] weights [ x ] follow up per protocol    [ ] other:    Recommendations d/w team during CTU rounds.

## 2017-06-19 NOTE — PROGRESS NOTE ADULT - ATTENDING COMMENTS
Clinically improving. Would not give further diuretics today. Will gradually wean off of milrinone. PT initiated. Giving coumadin 2.5 mg tonight.

## 2017-06-19 NOTE — PHYSICAL THERAPY INITIAL EVALUATION ADULT - ADDITIONAL COMMENTS
Pt lives with brother who works with [employer], has HA x 9-1pm x 5days, visiting nurse comes to assist with medications. Pt amb without assistive device indoors. Attempted to amb outdoors <1 block and experienced difficulty

## 2017-06-19 NOTE — PROGRESS NOTE ADULT - PROBLEM SELECTOR PLAN 1
hemodynamically mediated in setting of decompensated heart failure +/- urinary retention component and hypotension.  Pt with improved SCr to 2.3 today (from 2.6 yesterday). Pt with  good urine output(2770).   Monitor BMP  Strict I/O, avoid nephrotoxics, NSAIDs, RCA. Renal dose of medications.

## 2017-06-19 NOTE — PROGRESS NOTE ADULT - PROBLEM SELECTOR PLAN 4
No programming changes.  Continue aspirin 81 mg daily.  On heparin gtt with aPTT 60s (goal 50-70)  C/w coumadin 2.5 mg tonight

## 2017-06-19 NOTE — PROGRESS NOTE ADULT - ASSESSMENT
67 year old man with ACC/AHA stage D congestive heart failure with severely reduced LV systolic function due to a nonischemic dilated cardiomyopathy s/p HM2 LVAD on 6/12 with post-operative course complicated by hemodynamically but self-terminating ventricular tachycardia and acute renal failure. He continues on amiodarone for recurrent VT and his inotropes are gradually being weaned. His renal function has stabilized and he is off of renal replacement therapy.

## 2017-06-19 NOTE — PROGRESS NOTE ADULT - SUBJECTIVE AND OBJECTIVE BOX
[facility] DIVISION OF KIDNEY DISEASES AND HYPERTENSION -- [phone]   FOLLOW UP NOTE  --------------------------------------------------------------------------------  Chief Complaint: Heart failure      24hour events/Subjective: Pt seen and examined at bedside. Pt s/p extubation yesterday. Pt also spiked fever overnight. Denies sob/cp.        PAST HISTORY  --------------------------------------------------------------------------------  No significant changes to PMH, PSH, FHx, SHx, unless otherwise noted    ALLERGIES & MEDICATIONS  --------------------------------------------------------------------------------  Allergies    No Known Allergies    Intolerances      Standing Inpatient Medications  sodium chloride 0.9%. 1000milliLiter(s) IV Continuous <Continuous>  docusate sodium 100milliGRAM(s) Oral three times a day  dextrose 5%. 1000milliLiter(s) IV Continuous <Continuous>  dextrose 50% Injectable 25Gram(s) IV Push once  chlorhexidine 0.12% Liquid 15milliLiter(s) Swish and Spit two times a day  pantoprazole  Injectable 40milliGRAM(s) IV Push every 24 hours  furosemide Infusion 5mG/Hr IV Continuous <Continuous>  meropenem IVPB 500milliGRAM(s) IV Intermittent every 12 hours  insulin lispro (HumaLOG) corrective regimen sliding scale  SubCutaneous every 4 hours  milrinone Infusion 0.375MICROgram(s)/kG/Min IV Continuous <Continuous>  aspirin  chewable 81milliGRAM(s) Oral daily  heparin  Infusion 800Unit(s)/Hr IV Continuous <Continuous>  amiodarone    Tablet 200milliGRAM(s) Oral daily  vancomycin  IVPB  IV Intermittent     PRN Inpatient Medications      REVIEW OF SYSTEMS  --------------------------------------------------------------------------------  As above         VITALS/PHYSICAL EXAM  --------------------------------------------------------------------------------  T(C): 38.2, Max: 38.4 (06-19 @ 03:00)  HR: 107 (92 - 109)  BP: --  RR: 19 (15 - 28)  SpO2: 100% (95% - 100%)  Wt(kg): --      I & Os for 24h ending 06-19-17 @ 07:00  =============================================  IN: 3578.2 ml / OUT: 3130 ml / NET: 448.2 ml    I & Os for current day (as of 06-19-17 @ 09:35)  =============================================  IN: 77.4 ml / OUT: 85 ml / NET: -7.6 ml    Physical Exam:  	Gen: male sitting up in chair, NAD, on nasal cannula   	HEENT: MMM  	Pulm: CTA B/L  	CV: S1S2  	Abd: Soft, +BS  	Ext: No LE edema B/L                      Neuro: Awake   	Skin: Warm and Dry   	Other: +emily    LABS/STUDIES  --------------------------------------------------------------------------------              9.4    16.1  >-----------<  126      [06-19-17 @ 00:36]              29.1     130  |  97  |  44  ----------------------------<  113      [06-19-17 @ 00:36]  4.4   |  19  |  2.39        Ca     8.3     [06-19-17 @ 00:36]      Mg     2.9     [06-19-17 @ 00:36]    TPro  6.1  /  Alb  2.8  /  TBili  4.6  /  DBili  x   /  AST  102  /  ALT  54  /  AlkPhos  172  [06-19-17 @ 00:36]    PT/INR: PT 14.4 , INR 1.31       [06-18-17 @ 16:16]  PTT: 63.0       [06-19-17 @ 06:27]          [06-19-17 @ 00:36]    Creatinine Trend:  SCr 2.39 [06-19 @ 00:36]  SCr 2.64 [06-18 @ 00:20]  SCr 2.60 [06-17 @ 03:06]  SCr 2.69 [06-16 @ 13:12]  SCr 2.63 [06-16 @ 01:54]    Urinalysis - [06-16-17 @ 04:51]      Color Yellow / Appearance Clear / SG 1.008 / pH 5.5      Gluc Negative / Ketone Negative  / Bili Negative / Urobili Negative       Blood Small / Protein Negative / Leuk Est Negative / Nitrite Negative      RBC 0-2 / WBC 3-5 / Hyaline 0-2 / Gran  / Sq Epi  / Non Sq Epi OCC / Bacteria       Iron 20, TIBC 352, %sat 6      [06-08-17 @ 07:14]  Ferritin 121.0      [06-09-17 @ 19:08]  HbA1c 5.5      [06-07-17 @ 06:14]  TSH 2.33      [06-14-17 @ 14:51]  Lipid: chol 62, TG 33, HDL 17, LDL 38      [06-07-17 @ 06:14] [facility] DIVISION OF KIDNEY DISEASES AND HYPERTENSION -- [phone]   FOLLOW UP NOTE  --------------------------------------------------------------------------------  Chief Complaint: Heart failure      24hour events/Subjective: Pt seen and examined at bedside. Pt s/p extubation yesterday. Pt also spiked fever overnight. Denies sob/cp.        PAST HISTORY  --------------------------------------------------------------------------------  No significant changes to PMH, PSH, FHx, SHx, unless otherwise noted    ALLERGIES & MEDICATIONS  --------------------------------------------------------------------------------  Allergies    No Known Allergies    Intolerances      Standing Inpatient Medications  sodium chloride 0.9%. 1000milliLiter(s) IV Continuous <Continuous>  docusate sodium 100milliGRAM(s) Oral three times a day  dextrose 5%. 1000milliLiter(s) IV Continuous <Continuous>  dextrose 50% Injectable 25Gram(s) IV Push once  chlorhexidine 0.12% Liquid 15milliLiter(s) Swish and Spit two times a day  pantoprazole  Injectable 40milliGRAM(s) IV Push every 24 hours  furosemide Infusion 5mG/Hr IV Continuous <Continuous>  meropenem IVPB 500milliGRAM(s) IV Intermittent every 12 hours  insulin lispro (HumaLOG) corrective regimen sliding scale  SubCutaneous every 4 hours  milrinone Infusion 0.375MICROgram(s)/kG/Min IV Continuous <Continuous>  aspirin  chewable 81milliGRAM(s) Oral daily  heparin  Infusion 800Unit(s)/Hr IV Continuous <Continuous>  amiodarone    Tablet 200milliGRAM(s) Oral daily  vancomycin  IVPB  IV Intermittent     PRN Inpatient Medications      REVIEW OF SYSTEMS  --------------------------------------------------------------------------------  As above         VITALS/PHYSICAL EXAM  --------------------------------------------------------------------------------  T(C): 38.2, Max: 38.4 (06-19 @ 03:00)  HR: 107 (92 - 109)  BP: --  RR: 19 (15 - 28)  SpO2: 100% (95% - 100%)  Wt(kg): --      I & Os for 24h ending 06-19-17 @ 07:00  =============================================  IN: 3578.2 ml / OUT: 3130 ml / NET: 448.2 ml    I & Os for current day (as of 06-19-17 @ 09:35)  =============================================  IN: 77.4 ml / OUT: 85 ml / NET: -7.6 ml    Physical Exam:  	Gen: male sitting up in chair, NAD, on nasal cannula   	HEENT: MMM  	Pulm: CTA B/L  	CV: S1S2  	Abd: Soft, +BS  	Ext:+ LE edema B/L                      Neuro: Awake   	Skin: Warm and Dry   	Other: +emily    LABS/STUDIES  --------------------------------------------------------------------------------              9.4    16.1  >-----------<  126      [06-19-17 @ 00:36]              29.1     130  |  97  |  44  ----------------------------<  113      [06-19-17 @ 00:36]  4.4   |  19  |  2.39        Ca     8.3     [06-19-17 @ 00:36]      Mg     2.9     [06-19-17 @ 00:36]    TPro  6.1  /  Alb  2.8  /  TBili  4.6  /  DBili  x   /  AST  102  /  ALT  54  /  AlkPhos  172  [06-19-17 @ 00:36]    PT/INR: PT 14.4 , INR 1.31       [06-18-17 @ 16:16]  PTT: 63.0       [06-19-17 @ 06:27]          [06-19-17 @ 00:36]    Creatinine Trend:  SCr 2.39 [06-19 @ 00:36]  SCr 2.64 [06-18 @ 00:20]  SCr 2.60 [06-17 @ 03:06]  SCr 2.69 [06-16 @ 13:12]  SCr 2.63 [06-16 @ 01:54]    Urinalysis - [06-16-17 @ 04:51]      Color Yellow / Appearance Clear / SG 1.008 / pH 5.5      Gluc Negative / Ketone Negative  / Bili Negative / Urobili Negative       Blood Small / Protein Negative / Leuk Est Negative / Nitrite Negative      RBC 0-2 / WBC 3-5 / Hyaline 0-2 / Gran  / Sq Epi  / Non Sq Epi OCC / Bacteria       Iron 20, TIBC 352, %sat 6      [06-08-17 @ 07:14]  Ferritin 121.0      [06-09-17 @ 19:08]  HbA1c 5.5      [06-07-17 @ 06:14]  TSH 2.33      [06-14-17 @ 14:51]  Lipid: chol 62, TG 33, HDL 17, LDL 38      [06-07-17 @ 06:14]

## 2017-06-19 NOTE — PROGRESS NOTE ADULT - PROBLEM SELECTOR PLAN 1
Doing well with normal RAP.   He does not need further diuresis at this point, CVP on the lower side.

## 2017-06-19 NOTE — PROGRESS NOTE ADULT - SUBJECTIVE AND OBJECTIVE BOX
CRITICAL CARE ATTENDING - CTICU    MEDICATIONS  (STANDING):  sodium chloride 0.9%. 1000milliLiter(s) IV Continuous <Continuous>  docusate sodium 100milliGRAM(s) Oral three times a day  dextrose 5%. 1000milliLiter(s) IV Continuous <Continuous>  dextrose 50% Injectable 25Gram(s) IV Push once  chlorhexidine 0.12% Liquid 15milliLiter(s) Swish and Spit two times a day  pantoprazole  Injectable 40milliGRAM(s) IV Push every 24 hours  furosemide Infusion 5mG/Hr IV Continuous <Continuous>  meropenem IVPB 500milliGRAM(s) IV Intermittent every 12 hours  insulin lispro (HumaLOG) corrective regimen sliding scale  SubCutaneous every 4 hours  milrinone Infusion 0.375MICROgram(s)/kG/Min IV Continuous <Continuous>  aspirin  chewable 81milliGRAM(s) Oral daily  heparin  Infusion 800Unit(s)/Hr IV Continuous <Continuous>  amiodarone    Tablet 200milliGRAM(s) Oral daily  vancomycin  IVPB  IV Intermittent                                     9.4    16.1  )-----------( 126      ( 2017 00:36 )             29.1           130<L>  |  97  |  44<H>  ----------------------------<  113<H>  4.4   |  19<L>  |  2.39<H>    Ca    8.3<L>      2017 00:36  Mg     2.9         TPro  6.1  /  Alb  2.8<L>  /  TBili  4.6<H>  /  DBili  x   /  AST  102<H>  /  ALT  54<H>  /  AlkPhos  172<H>        PT/INR - ( 2017 16:16 )   PT: 14.4 sec;   INR: 1.31 ratio         PTT - ( 2017 06:27 )  PTT:63.0 sec        Daily     Daily Weight in k.8 (2017 06:00)    I & Os for 24h ending  @ 07:00  =============================================  IN: 3578.2 ml / OUT: 3130 ml / NET: 448.2 ml    I & Os for current day (as of  @ 09:58)  =============================================  IN: 77.4 ml / OUT: 85 ml / NET: -7.6 ml      Critically Ill patient  : [ ] preoperative ,   [x ] post operative    Requires :  [x ] Arterial Line   [xx ] Central Line  [ ] PA catheter  [ ] IABP  [xx ] ECMO  [ ] LVAD  [ ] Ventilator  [x ] pacemaker [ ] Impella.    Bedside evaluation , monitoring , treatment of hemodynamics , fluids , IVP/ IVCD meds.        Diagnosis:     Cardiogenic Shock - resolving    CHF- acute [x ]   chronic [x ]    systolic [x ]   diatolic [ ]          - Echo- EF - 10            [x ] RV dysfunction          - Cxr-cardiomegally, edema          - Clinical-  [x ]inotropes   [x ]pressors   [x ]diuresis   [ ]IABP   [ ]ECMO   [x ]LVAD   [ ]Respiratory Failure    Hemodynamic lability,instability. Requires IVCD [x ] vasopressors [x inotropes  [ ] vasodilator  [ ]IVSS fluid  to maintain MAP, perfusion, C.I.     fluid overload     Renal Failure     Requires chest PT, pulmonary toilet, ambu bagging, suctioning to maintain SaO2,  patent airway and treat atelectasis.     Requires bedside physical therapy, mobilization and total [facility] care.     Hypotension a/w Hypertension, requiring intravenous medication.     IVCD anticoagulation with [x ] Heparin  [ ] Argatroban for LVAD, A Fib.    LVAD circuit    V TACH / V FIB                -                 Discussed with CT surgeon, Physician's Assistant - Nurse Practitioner- Critical care medicine team.   Dicussed at  AM / PM rounds.   Chart, labs , films reviewed.    Total Time: 120 m in.

## 2017-06-20 DIAGNOSIS — E87.6 HYPOKALEMIA: ICD-10-CM

## 2017-06-20 LAB
ALBUMIN SERPL ELPH-MCNC: 2.1 G/DL — LOW (ref 3.3–5)
ALP SERPL-CCNC: 104 U/L — SIGNIFICANT CHANGE UP (ref 40–120)
ALT FLD-CCNC: 33 U/L RC — SIGNIFICANT CHANGE UP (ref 10–45)
ANION GAP SERPL CALC-SCNC: 11 MMOL/L — SIGNIFICANT CHANGE UP (ref 5–17)
APTT BLD: 39.4 SEC — HIGH (ref 27.5–37.4)
APTT BLD: 46.8 SEC — HIGH (ref 27.5–37.4)
APTT BLD: 47.8 SEC — HIGH (ref 27.5–37.4)
APTT BLD: 61.8 SEC — HIGH (ref 27.5–37.4)
AST SERPL-CCNC: 56 U/L — HIGH (ref 10–40)
BASE EXCESS BLDV CALC-SCNC: -0.1 MMOL/L — SIGNIFICANT CHANGE UP (ref -2–2)
BILIRUB SERPL-MCNC: 3.2 MG/DL — HIGH (ref 0.2–1.2)
BLD GP AB SCN SERPL QL: NEGATIVE — SIGNIFICANT CHANGE UP
BUN SERPL-MCNC: 41 MG/DL — HIGH (ref 7–23)
CALCIUM SERPL-MCNC: 6.1 MG/DL — CRITICAL LOW (ref 8.4–10.5)
CHLORIDE SERPL-SCNC: 109 MMOL/L — HIGH (ref 96–108)
CO2 BLDV-SCNC: 25 MMOL/L — SIGNIFICANT CHANGE UP (ref 22–30)
CO2 SERPL-SCNC: 18 MMOL/L — LOW (ref 22–31)
CREAT SERPL-MCNC: 1.69 MG/DL — HIGH (ref 0.5–1.3)
GAS PNL BLDA: SIGNIFICANT CHANGE UP
GAS PNL BLDV: SIGNIFICANT CHANGE UP
GLUCOSE SERPL-MCNC: 89 MG/DL — SIGNIFICANT CHANGE UP (ref 70–99)
HAPTOGLOB SERPL-MCNC: 98 MG/DL — SIGNIFICANT CHANGE UP (ref 34–200)
HCO3 BLDV-SCNC: 24 MMOL/L — SIGNIFICANT CHANGE UP (ref 21–29)
HCT VFR BLD CALC: 24.6 % — LOW (ref 39–50)
HGB BLD-MCNC: 7.9 G/DL — LOW (ref 13–17)
HOROWITZ INDEX BLDV+IHG-RTO: 40 — SIGNIFICANT CHANGE UP
INR BLD: 1.11 RATIO — SIGNIFICANT CHANGE UP (ref 0.88–1.16)
INR BLD: 1.13 RATIO — SIGNIFICANT CHANGE UP (ref 0.88–1.16)
INR BLD: 1.14 RATIO — SIGNIFICANT CHANGE UP (ref 0.88–1.16)
LDH SERPL L TO P-CCNC: 389 U/L — HIGH (ref 50–242)
MCHC RBC-ENTMCNC: 30.1 PG — SIGNIFICANT CHANGE UP (ref 27–34)
MCHC RBC-ENTMCNC: 32 GM/DL — SIGNIFICANT CHANGE UP (ref 32–36)
MCV RBC AUTO: 94.1 FL — SIGNIFICANT CHANGE UP (ref 80–100)
PCO2 BLDV: 38 MMHG — SIGNIFICANT CHANGE UP (ref 35–50)
PH BLDV: 7.41 — SIGNIFICANT CHANGE UP (ref 7.35–7.45)
PLATELET # BLD AUTO: 157 K/UL — SIGNIFICANT CHANGE UP (ref 150–400)
PO2 BLDV: 40 MMHG — SIGNIFICANT CHANGE UP (ref 25–45)
POTASSIUM SERPL-MCNC: 3.2 MMOL/L — LOW (ref 3.5–5.3)
POTASSIUM SERPL-SCNC: 3.2 MMOL/L — LOW (ref 3.5–5.3)
PROT SERPL-MCNC: 4.4 G/DL — LOW (ref 6–8.3)
PROTHROM AB SERPL-ACNC: 12.1 SEC — SIGNIFICANT CHANGE UP (ref 9.8–12.7)
PROTHROM AB SERPL-ACNC: 12.3 SEC — SIGNIFICANT CHANGE UP (ref 9.8–12.7)
PROTHROM AB SERPL-ACNC: 12.3 SEC — SIGNIFICANT CHANGE UP (ref 9.8–12.7)
RBC # BLD: 2.62 M/UL — LOW (ref 4.2–5.8)
RBC # FLD: 22 % — HIGH (ref 10.3–14.5)
RH IG SCN BLD-IMP: POSITIVE — SIGNIFICANT CHANGE UP
SAO2 % BLDV: 74 % — SIGNIFICANT CHANGE UP (ref 67–88)
SODIUM SERPL-SCNC: 138 MMOL/L — SIGNIFICANT CHANGE UP (ref 135–145)
VANCOMYCIN TROUGH SERPL-MCNC: 22.2 UG/ML — HIGH (ref 10–20)
WBC # BLD: 17.3 K/UL — HIGH (ref 3.8–10.5)
WBC # FLD AUTO: 17.3 K/UL — HIGH (ref 3.8–10.5)

## 2017-06-20 PROCEDURE — 71010: CPT | Mod: 26

## 2017-06-20 PROCEDURE — 90834 PSYTX W PT 45 MINUTES: CPT

## 2017-06-20 PROCEDURE — 99292 CRITICAL CARE ADDL 30 MIN: CPT

## 2017-06-20 PROCEDURE — 93750 INTERROGATION VAD IN PERSON: CPT

## 2017-06-20 PROCEDURE — 99291 CRITICAL CARE FIRST HOUR: CPT

## 2017-06-20 PROCEDURE — 99233 SBSQ HOSP IP/OBS HIGH 50: CPT | Mod: GC

## 2017-06-20 PROCEDURE — 99233 SBSQ HOSP IP/OBS HIGH 50: CPT | Mod: 25,GC

## 2017-06-20 RX ORDER — WARFARIN SODIUM 2.5 MG/1
3 TABLET ORAL ONCE
Qty: 0 | Refills: 0 | Status: COMPLETED | OUTPATIENT
Start: 2017-06-20 | End: 2017-06-20

## 2017-06-20 RX ORDER — FUROSEMIDE 40 MG
40 TABLET ORAL ONCE
Qty: 0 | Refills: 0 | Status: COMPLETED | OUTPATIENT
Start: 2017-06-20 | End: 2017-06-20

## 2017-06-20 RX ORDER — POTASSIUM CHLORIDE 20 MEQ
20 PACKET (EA) ORAL ONCE
Qty: 0 | Refills: 0 | Status: COMPLETED | OUTPATIENT
Start: 2017-06-20 | End: 2017-06-20

## 2017-06-20 RX ORDER — FUROSEMIDE 40 MG
20 TABLET ORAL ONCE
Qty: 0 | Refills: 0 | Status: COMPLETED | OUTPATIENT
Start: 2017-06-20 | End: 2017-06-20

## 2017-06-20 RX ORDER — HEPARIN SODIUM 5000 [USP'U]/ML
1000 INJECTION INTRAVENOUS; SUBCUTANEOUS
Qty: 25000 | Refills: 0 | Status: DISCONTINUED | OUTPATIENT
Start: 2017-06-20 | End: 2017-06-25

## 2017-06-20 RX ORDER — INSULIN LISPRO 100/ML
VIAL (ML) SUBCUTANEOUS AT BEDTIME
Qty: 0 | Refills: 0 | Status: DISCONTINUED | OUTPATIENT
Start: 2017-06-20 | End: 2017-06-29

## 2017-06-20 RX ORDER — INSULIN LISPRO 100/ML
VIAL (ML) SUBCUTANEOUS
Qty: 0 | Refills: 0 | Status: DISCONTINUED | OUTPATIENT
Start: 2017-06-20 | End: 2017-06-29

## 2017-06-20 RX ORDER — HEPARIN SODIUM 5000 [USP'U]/ML
850 INJECTION INTRAVENOUS; SUBCUTANEOUS
Qty: 25000 | Refills: 0 | Status: DISCONTINUED | OUTPATIENT
Start: 2017-06-20 | End: 2017-06-20

## 2017-06-20 RX ADMIN — PANTOPRAZOLE SODIUM 40 MILLIGRAM(S): 20 TABLET, DELAYED RELEASE ORAL at 22:01

## 2017-06-20 RX ADMIN — Medication 81 MILLIGRAM(S): at 12:52

## 2017-06-20 RX ADMIN — Medication 20 MILLIGRAM(S): at 12:52

## 2017-06-20 RX ADMIN — MEROPENEM 200 MILLIGRAM(S): 1 INJECTION INTRAVENOUS at 05:44

## 2017-06-20 RX ADMIN — Medication 2: at 11:21

## 2017-06-20 RX ADMIN — Medication 100 MILLIGRAM(S): at 05:44

## 2017-06-20 RX ADMIN — MILRINONE LACTATE 5.62 MICROGRAM(S)/KG/MIN: 1 INJECTION, SOLUTION INTRAVENOUS at 12:52

## 2017-06-20 RX ADMIN — WARFARIN SODIUM 3 MILLIGRAM(S): 2.5 TABLET ORAL at 22:01

## 2017-06-20 RX ADMIN — HEPARIN SODIUM 10 UNIT(S)/HR: 5000 INJECTION INTRAVENOUS; SUBCUTANEOUS at 15:47

## 2017-06-20 RX ADMIN — Medication 40 MILLIGRAM(S): at 17:38

## 2017-06-20 RX ADMIN — Medication 100 MILLIGRAM(S): at 22:01

## 2017-06-20 RX ADMIN — CHLORHEXIDINE GLUCONATE 15 MILLILITER(S): 213 SOLUTION TOPICAL at 05:44

## 2017-06-20 RX ADMIN — HEPARIN SODIUM 7.5 UNIT(S)/HR: 5000 INJECTION INTRAVENOUS; SUBCUTANEOUS at 03:39

## 2017-06-20 RX ADMIN — Medication 20 MILLIEQUIVALENT(S): at 01:30

## 2017-06-20 RX ADMIN — AMIODARONE HYDROCHLORIDE 200 MILLIGRAM(S): 400 TABLET ORAL at 05:44

## 2017-06-20 RX ADMIN — Medication 20 MILLIEQUIVALENT(S): at 17:37

## 2017-06-20 RX ADMIN — HEPARIN SODIUM 8.5 UNIT(S)/HR: 5000 INJECTION INTRAVENOUS; SUBCUTANEOUS at 12:53

## 2017-06-20 NOTE — PROGRESS NOTE ADULT - PROBLEM SELECTOR PLAN 1
Doing well with normal RAP.   He has edema on exam, but JVP does not appear grossly elevated at this point  Closely monitor volume status He has edema on exam, but JVP does not appear grossly elevated at this point  Closely monitor volume status

## 2017-06-20 NOTE — PROGRESS NOTE ADULT - SUBJECTIVE AND OBJECTIVE BOX
Behavioral Cardiology Progress Note     HPI: Pt with advanced heart failure, s/p LVAD implant     Behavioral Health Assessment:     Mood: "not bad" but became tearful when discussing his family.   Expressed concern about his granddaughter who he hasn’t told about his surgery.      Sleep: Woke frequently last night but reports he naps during the day.         Current stressors:   Post-surgical recovery/adjustment to LVAD   Worries about family member     Support system/family support: Good support (close friend and brother)      Coping strategies: Talking with staff, good sense of humor, talking with close friend and brother     Understanding of medical illness and treatment plan: In process of speaking with LVAD coordinator for education on LVAD management.      MSE:  Pt seen sitting in chair.  A&Ox3. Well related with good eye contact.  Thought process goal directed.  No abnormal thought content; denies s/i.  Mood: "not bad."  periods of tearfulness.  Affect: full range.  Insight and judgment adequate.      DX: systolic heart failure; s/p LVAD; r/o Adjustment disorder with anxiety and depressed mood

## 2017-06-20 NOTE — PROGRESS NOTE ADULT - ASSESSMENT
67 year old man with ACC/AHA stage D congestive heart failure with severely reduced LV systolic function due to a nonischemic dilated cardiomyopathy s/p HM2 LVAD on 6/12 with post-operative course complicated by hemodynamically but self-terminating ventricular tachycardia and acute renal failure. He continues on amiodarone for recurrent VT and his inotropes are gradually being weaned. His renal function has stabilized and now is improving and he is off of renal replacement therapy. 67 year old man with ACC/AHA stage D congestive heart failure with severely reduced LV systolic function due to a nonischemic dilated cardiomyopathy s/p HM2 LVAD on 6/12 with post-operative course complicated by hemodynamically but self-terminating ventricular tachycardia and acute renal failure. His inotropes are gradually being weaned. His renal function is improving.

## 2017-06-20 NOTE — PROGRESS NOTE ADULT - PROBLEM SELECTOR PLAN 6
Platelets increasing.   HIT negative Leukocytosis improving and cultures remain negative, but with persistent low grade fevers  He continues on empiric meropenem and vancomycin, which will be discontinued as cultures have remained negative and there is no clear source

## 2017-06-20 NOTE — PROGRESS NOTE ADULT - PROBLEM SELECTOR PROBLEM 4
LVAD (left ventricular assist device) present Acute renal failure, unspecified acute renal failure type

## 2017-06-20 NOTE — PROGRESS NOTE ADULT - PROBLEM SELECTOR PLAN 7
Leukocytosis improving and cultures remain negative, but with persistent low grade fevers  He continues on empiric meropenem and vancomycin

## 2017-06-20 NOTE — PROGRESS NOTE ADULT - SUBJECTIVE AND OBJECTIVE BOX
Interval History:    Ambulated yesterday, feels well this AM. Cr improving.    Medications:  sodium chloride 0.9%. 1000milliLiter(s) IV Continuous <Continuous>  docusate sodium 100milliGRAM(s) Oral three times a day  dextrose 5%. 1000milliLiter(s) IV Continuous <Continuous>  dextrose 50% Injectable 25Gram(s) IV Push once  pantoprazole  Injectable 40milliGRAM(s) IV Push every 24 hours  meropenem IVPB 500milliGRAM(s) IV Intermittent every 12 hours  milrinone Infusion 0.25MICROgram(s)/kG/Min IV Continuous <Continuous>  aspirin  chewable 81milliGRAM(s) Oral daily  amiodarone    Tablet 200milliGRAM(s) Oral daily  vancomycin  IVPB  IV Intermittent   vancomycin  IVPB 1000milliGRAM(s) IV Intermittent every 24 hours  heparin  Infusion 850Unit(s)/Hr IV Continuous <Continuous>  insulin lispro (HumaLOG) corrective regimen sliding scale  SubCutaneous three times a day before meals  insulin lispro (HumaLOG) corrective regimen sliding scale  SubCutaneous at bedtime    Vitals:  T(C): 37.9, Max: 37.9 (-20 @ 12:00)  HR: 108 (101 - 118)  ABP: 92/81 (79/69 - 98/87)  ABP(mean): 87 (74 - 93)  RR: 23 (17 - 29)  SpO2: 100% (98% - 100%)    Daily     Daily Weight in k.6 (2017 00:00)        I&O's Summary  I & Os for 24h ending 2017 07:00  =============================================  IN: 3038.6 ml / OUT: 1660 ml / NET: 1378.6 ml    I & Os for current day (as of 2017 13:20)  =============================================  IN: 606.6 ml / OUT: 435 ml / NET: 171.6 ml      Physical Exam:  Appearance: No Acute Distress  HEENT: No JVP  Cardiovascular: Audible S1 S2, +LVAD hum  Respiratory: coarse breath sounds  Gastrointestinal: Soft, Non-tender	  Skin: No cyanosis	  Neurologic: Non-focal  Extremities: 1+ LE edema  Psychiatry: A & O x 3, Mood & affect appropriate    LVAD Interrogation: HMII  Pump Flow: 6 lpm  Pump Speed: 9200  Pulse Index: 5.5  Pump Power: 5.9  VAD Events: No PI events  Driveline evaluation: Soft, non-tender  Programming Changes: No changes made    Labs:                        7.9    17.3  )-----------( 157      ( 2017 01:15 )             24.6         138  |  109<H>  |  41<H>  ----------------------------<  89  3.2<L>   |  18<L>  |  1.69<H>    Ca    6.1<LL>      2017 01:15  Mg     2.9         TPro  4.4<L>  /  Alb  2.1<L>  /  TBili  3.2<H>  /  DBili  x   /  AST  56<H>  /  ALT  33  /  AlkPhos  104      PT/INR - ( 2017 01:15 )   PT: 12.3 sec;   INR: 1.14 ratio      PTT - ( 2017 08:46 )  PTT:46.8 sec    Lactate Dehydrogenase, Serum: 389 U/L ( @ 01:15)  Lactate Dehydrogenase, Serum: 609 U/L ( @ 00:36)  Lactate Dehydrogenase, Serum: 580 U/L ( @ 00:20)

## 2017-06-20 NOTE — PROGRESS NOTE ADULT - ATTENDING COMMENTS
NORMAN on CKD  renal function improving with  good UO ( on lasix drip )   continue diuresis, no acute need for RRT NORMAN on CKD  renal function improving with  good UO  No longer on lasix  Continue on milrinone per CTICU/Heart failure  Hypokalemia: noted  received K supplements

## 2017-06-20 NOTE — PROGRESS NOTE ADULT - PROBLEM SELECTOR PLAN 5
Creatinine now improving, likely related to ATN.   Urine output adequate Platelets increasing.   HIT negative

## 2017-06-20 NOTE — PROGRESS NOTE ADULT - PROBLEM SELECTOR PLAN 4
No programming changes.  Continue aspirin 81 mg daily.  On heparin gtt with aPTT 60s (goal 50-70)  C/w coumadin 2.5 mg tonight Creatinine now improving, likely related to ATN.   Urine output adequate

## 2017-06-20 NOTE — PROGRESS NOTE ADULT - PROBLEM SELECTOR PLAN 2
Currrently on milrinone at 0.250 mcg/kg/min, continue as is Currently on milrinone at 0.250 mcg/kg/min

## 2017-06-20 NOTE — PROGRESS NOTE ADULT - PROBLEM SELECTOR PLAN 3
Extubated, doing well, off of Kamala. No programming changes.  Continue aspirin 81 mg daily.  On heparin gtt with aPTT 60s (goal 50-70)  C/w coumadin 3mg tonight

## 2017-06-20 NOTE — PROGRESS NOTE ADULT - ATTENDING COMMENTS
Clinically improving with decreased dose of milrinone. Total body volume overloaded but without evidence of RV failure. Coumadin tonight at 3 mg.

## 2017-06-20 NOTE — PROGRESS NOTE ADULT - ASSESSMENT
Overall appears to be coping well with adjustment to LVAD, in process of education on LVAD management with LVAD coordinator; family friend is primary support person (Tahira).  Sleep poor at night due to environmental factors but naps during day.  Experiencing periods of increased worry and tearfulness when discussing his granddaughter who doesn’t know about his surgery.  Open to discussing his feelings; receptive to support and validation.       Recommendations:   Behavioral Cardiology will continue to follow to facilitate adjustment to LVAD

## 2017-06-20 NOTE — PROGRESS NOTE ADULT - SUBJECTIVE AND OBJECTIVE BOX
CRITICAL CARE ATTENDING - CTICU    MEDICATIONS  (STANDING):  sodium chloride 0.9%. 1000milliLiter(s) IV Continuous <Continuous>  docusate sodium 100milliGRAM(s) Oral three times a day  dextrose 5%. 1000milliLiter(s) IV Continuous <Continuous>  dextrose 50% Injectable 25Gram(s) IV Push once  pantoprazole  Injectable 40milliGRAM(s) IV Push every 24 hours  meropenem IVPB 500milliGRAM(s) IV Intermittent every 12 hours  milrinone Infusion 0.25MICROgram(s)/kG/Min IV Continuous <Continuous>  aspirin  chewable 81milliGRAM(s) Oral daily  amiodarone    Tablet 200milliGRAM(s) Oral daily  vancomycin  IVPB  IV Intermittent   vancomycin  IVPB 1000milliGRAM(s) IV Intermittent every 24 hours  heparin  Infusion 850Unit(s)/Hr IV Continuous <Continuous>  insulin lispro (HumaLOG) corrective regimen sliding scale  SubCutaneous three times a day before meals  insulin lispro (HumaLOG) corrective regimen sliding scale  SubCutaneous at bedtime                                    7.9    17.3  )-----------( 157      ( 2017 01:15 )             24.6       06-20    138  |  109<H>  |  41<H>  ----------------------------<  89  3.2<L>   |  18<L>  |  1.69<H>    Ca    6.1<LL>      2017 01:15  Mg     2.9     06-19    TPro  4.4<L>  /  Alb  2.1<L>  /  TBili  3.2<H>  /  DBili  x   /  AST  56<H>  /  ALT  33  /  AlkPhos  104  06-20      PT/INR - ( 2017 01:15 )   PT: 12.3 sec;   INR: 1.14 ratio         PTT - ( 2017 08:46 )  PTT:46.8 sec        Daily     Daily Weight in k.6 (2017 00:00)    I & Os for 24h ending -20 @ 07:00  =============================================  IN: 3038.6 ml / OUT: 1660 ml / NET: 1378.6 ml    I & Os for current day (as of  @ 13:19)  =============================================  IN: 606.6 ml / OUT: 435 ml / NET: 171.6 ml      Critically Ill patient  : [ ] preoperative ,   [x ] post operative    Requires :  [x ] Arterial Line   [x ] Central Line  [ ] PA catheter  [ ] IABP  [ ] ECMO  [x ] LVAD  [ ] Ventilator  [x ] pacemaker [ ] Impella.    Bedside evaluation , monitoring , treatment of hemodynamics , fluids , IVP/ IVCD meds.        Diagnosis:     POD 8: LVAD Implant    Cardiogenic Shock - resolving    CHF- acute [x]   chronic [x ]    systolic [x ]   diatolic [ ]          - Echo- EF -   10        [x ] RV dysfunction          - Cxr-cardiomegally, edema          - Clinical-  [x ]inotropes   [ ]pressors   [x ]diuresis   [ ]IABP   [ ]ECMO   [x ]LVAD   [ ]Respiratory Failure    Hemodynamic lability,instability. Requires IVCD [x ] vasopressors [x ] inotropes  [ ] vasodilator  [ ]IVSS fluid  to maintain MAP, perfusion, C.I.     Hypotension a/w Hypertension, requiring intravenous medication.     fluid overload     IVCD anticoagulation with [x ] Heparin  [ ] Argatroban for LVAD A Fib.    Requires chest PT, pulmonary toilet, ambu bagging, suctioning to maintain SaO2,  patent airway and treat atelectasis.     Requires bedside physical therapy, mobilization and total nursing home care.     Renal Failure     LVAD Circuit                    Discussed with CT surgeon, Physician's Assistant - Nurse Practitioner- Critical care medicine team.   Dicussed at  AM / PM rounds.   Chart, labs , films reviewed.    Total Time: 120 min.

## 2017-06-21 LAB
ALBUMIN SERPL ELPH-MCNC: 2.7 G/DL — LOW (ref 3.3–5)
ALP SERPL-CCNC: 169 U/L — HIGH (ref 40–120)
ALT FLD-CCNC: 51 U/L RC — HIGH (ref 10–45)
ANION GAP SERPL CALC-SCNC: 13 MMOL/L — SIGNIFICANT CHANGE UP (ref 5–17)
APPEARANCE UR: ABNORMAL
APTT BLD: 62.5 SEC — HIGH (ref 27.5–37.4)
AST SERPL-CCNC: 101 U/L — HIGH (ref 10–40)
BACTERIA # UR AUTO: ABNORMAL /HPF
BILIRUB SERPL-MCNC: 3.8 MG/DL — HIGH (ref 0.2–1.2)
BILIRUB UR-MCNC: NEGATIVE — SIGNIFICANT CHANGE UP
BUN SERPL-MCNC: 52 MG/DL — HIGH (ref 7–23)
CALCIUM SERPL-MCNC: 8.2 MG/DL — LOW (ref 8.4–10.5)
CHLORIDE SERPL-SCNC: 98 MMOL/L — SIGNIFICANT CHANGE UP (ref 96–108)
CHLORIDE UR-SCNC: 46 MMOL/L — SIGNIFICANT CHANGE UP
CO2 SERPL-SCNC: 22 MMOL/L — SIGNIFICANT CHANGE UP (ref 22–31)
COLOR SPEC: YELLOW — SIGNIFICANT CHANGE UP
CORTICOSTEROID BINDING GLOBULIN RESULT: 1.9 MG/DL — SIGNIFICANT CHANGE UP
CORTIS F/TOTAL MFR SERPL: 4.6 % — SIGNIFICANT CHANGE UP
CORTISOL, FREE RESULT: 0.27 UG/DL — SIGNIFICANT CHANGE UP
CORTISOL, TOTAL: 5.9 UG/DL — SIGNIFICANT CHANGE UP
CREAT SERPL-MCNC: 2.06 MG/DL — HIGH (ref 0.5–1.3)
CULTURE RESULTS: SIGNIFICANT CHANGE UP
CULTURE RESULTS: SIGNIFICANT CHANGE UP
DIFF PNL FLD: ABNORMAL
EPI CELLS # UR: SIGNIFICANT CHANGE UP /HPF
GAS PNL BLDA: SIGNIFICANT CHANGE UP
GLUCOSE SERPL-MCNC: 104 MG/DL — HIGH (ref 70–99)
GLUCOSE UR QL: NEGATIVE — SIGNIFICANT CHANGE UP
HAPTOGLOB SERPL-MCNC: 160 MG/DL — SIGNIFICANT CHANGE UP (ref 34–200)
HCT VFR BLD CALC: 27.1 % — LOW (ref 39–50)
HGB BLD-MCNC: 8.7 G/DL — LOW (ref 13–17)
INR BLD: 1.18 RATIO — HIGH (ref 0.88–1.16)
KETONES UR-MCNC: NEGATIVE — SIGNIFICANT CHANGE UP
LDH SERPL L TO P-CCNC: 473 U/L — HIGH (ref 50–242)
LEUKOCYTE ESTERASE UR-ACNC: ABNORMAL
MCHC RBC-ENTMCNC: 30.5 PG — SIGNIFICANT CHANGE UP (ref 27–34)
MCHC RBC-ENTMCNC: 32.2 GM/DL — SIGNIFICANT CHANGE UP (ref 32–36)
MCV RBC AUTO: 94.7 FL — SIGNIFICANT CHANGE UP (ref 80–100)
NITRITE UR-MCNC: NEGATIVE — SIGNIFICANT CHANGE UP
PH UR: 7 — SIGNIFICANT CHANGE UP (ref 5–8)
PLATELET # BLD AUTO: 223 K/UL — SIGNIFICANT CHANGE UP (ref 150–400)
POTASSIUM SERPL-MCNC: 4.5 MMOL/L — SIGNIFICANT CHANGE UP (ref 3.5–5.3)
POTASSIUM SERPL-SCNC: 4.5 MMOL/L — SIGNIFICANT CHANGE UP (ref 3.5–5.3)
PROT SERPL-MCNC: 5.8 G/DL — LOW (ref 6–8.3)
PROT UR-MCNC: 30 MG/DL
PROTHROM AB SERPL-ACNC: 12.9 SEC — HIGH (ref 9.8–12.7)
RBC # BLD: 2.86 M/UL — LOW (ref 4.2–5.8)
RBC # FLD: 22.5 % — HIGH (ref 10.3–14.5)
RBC CASTS # UR COMP ASSIST: SIGNIFICANT CHANGE UP /HPF (ref 0–2)
SODIUM SERPL-SCNC: 133 MMOL/L — LOW (ref 135–145)
SODIUM UR-SCNC: 42 MMOL/L — SIGNIFICANT CHANGE UP
SP GR SPEC: 1.01 — SIGNIFICANT CHANGE UP (ref 1.01–1.02)
SPECIMEN SOURCE: SIGNIFICANT CHANGE UP
SPECIMEN SOURCE: SIGNIFICANT CHANGE UP
UROBILINOGEN FLD QL: 4
WBC # BLD: 24.3 K/UL — HIGH (ref 3.8–10.5)
WBC # FLD AUTO: 24.3 K/UL — HIGH (ref 3.8–10.5)
WBC UR QL: SIGNIFICANT CHANGE UP /HPF (ref 0–5)

## 2017-06-21 PROCEDURE — 74000: CPT | Mod: 26

## 2017-06-21 PROCEDURE — 90834 PSYTX W PT 45 MINUTES: CPT

## 2017-06-21 PROCEDURE — 99233 SBSQ HOSP IP/OBS HIGH 50: CPT | Mod: GC

## 2017-06-21 PROCEDURE — 99233 SBSQ HOSP IP/OBS HIGH 50: CPT | Mod: 25

## 2017-06-21 PROCEDURE — 93750 INTERROGATION VAD IN PERSON: CPT

## 2017-06-21 RX ORDER — WARFARIN SODIUM 2.5 MG/1
4 TABLET ORAL ONCE
Qty: 0 | Refills: 0 | Status: COMPLETED | OUTPATIENT
Start: 2017-06-21 | End: 2017-06-21

## 2017-06-21 RX ORDER — FAMOTIDINE 10 MG/ML
20 INJECTION INTRAVENOUS DAILY
Qty: 0 | Refills: 0 | Status: DISCONTINUED | OUTPATIENT
Start: 2017-06-21 | End: 2017-06-27

## 2017-06-21 RX ORDER — FUROSEMIDE 40 MG
40 TABLET ORAL ONCE
Qty: 0 | Refills: 0 | Status: COMPLETED | OUTPATIENT
Start: 2017-06-21 | End: 2017-06-21

## 2017-06-21 RX ADMIN — MILRINONE LACTATE 5.62 MICROGRAM(S)/KG/MIN: 1 INJECTION, SOLUTION INTRAVENOUS at 12:02

## 2017-06-21 RX ADMIN — AMIODARONE HYDROCHLORIDE 200 MILLIGRAM(S): 400 TABLET ORAL at 05:34

## 2017-06-21 RX ADMIN — Medication 81 MILLIGRAM(S): at 12:01

## 2017-06-21 RX ADMIN — Medication 40 MILLIGRAM(S): at 12:01

## 2017-06-21 RX ADMIN — HEPARIN SODIUM 10 UNIT(S)/HR: 5000 INJECTION INTRAVENOUS; SUBCUTANEOUS at 12:01

## 2017-06-21 RX ADMIN — WARFARIN SODIUM 4 MILLIGRAM(S): 2.5 TABLET ORAL at 22:00

## 2017-06-21 RX ADMIN — FAMOTIDINE 20 MILLIGRAM(S): 10 INJECTION INTRAVENOUS at 12:01

## 2017-06-21 NOTE — PROGRESS NOTE ADULT - ATTENDING COMMENTS
NORMAN on CKD  renal function improving with  good UO  No longer on lasix  Continue on milrinone per CTICU/Heart failure  Hypokalemia: noted  received K supplements NORMAN on CKD post LVAD now. No obvious worsening right sided CHF, no JVP and edema not significant. 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  AIN from PPI possible. Would dc Protonix and change to pepcid.  No longer on lasix( but can get it if needed by CHF tem)  Continue on milrinone per CTICU/Heart failure  Check UA, prt/crt ratio given there was some protein.  Pt has hyaline casts and likely this is suggestive of a low flow state. Check urine Na as well today.   Hypokalemia: noted  received K supplements NORMAN on CKD post LVAD now. No obvious worsening right sided CHF, no JVP and edema not significant. 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  AIN from PPI possible. Would dc Protonix and change to pepcid.  No longer on lasix( but can get it if needed by CHF tem)  Continue on milrinone per CTICU/Heart failure  Check UA, prt/crt ratio given there was some protein.  Pt has hyaline casts and likely this is suggestive of a low flow state. Check urine Na as well today.   Hypokalemia: noted  received K supplements    Sarkis Hutchins MD  Cell   Pager   Office

## 2017-06-21 NOTE — PROGRESS NOTE ADULT - ASSESSMENT
Overall appears to be coping well with adjustment to LVAD, in process of education on LVAD management with LVAD coordinator; family friend is primary support person (T[place]).  Sleep improved; slept "beautifully" last night.   Feels rested today.  Managing pain well.  Ambulating in simms with PT.  Receptive to support and validation.   Reviewed HF guidelines with pt who could benefit from frequent review of treatment recommendations.       Recommendations:     Behavioral Cardiology will continue to follow to facilitate adjustment to LVAD As per staff, pulled out IV last night.  Today A&Ox2-3 (could not identify month).  Appears to have periods of confusion, especially at night. Overall appears to be coping well with adjustment to LVAD, in process of education on LVAD management with LVAD coordinator; family friend is primary support person (Tahira).  Sleep improved; slept "beautiful" last night.   Feels rested today.  Managing pain well.  Ambulating in simms with PT.  Receptive to support and validation.   Reviewed HF guidelines with pt who could benefit from frequent review of treatment recommendations.       Recommendations:   Will benefit from reminders of day/time/place at night to address possible periods of confusion.     Review HF guidelines with teach back  Behavioral Cardiology will continue to follow to facilitate adjustment to LVAD

## 2017-06-21 NOTE — PROGRESS NOTE ADULT - SUBJECTIVE AND OBJECTIVE BOX
Behavioral Cardiology Progress Note     HPI: Pt with advanced heart failure, s/p LVAD implant     Behavioral Health Assessment:     Mood: "I'm feeling good today."    Sleep: Slept well last night.           Current stressors:   Post-surgical recovery/adjustment to LVAD     Support system/family support: Good support (close friend and brother)      Coping strategies: Talking with staff, good sense of humor, talking with close friend and brother     Understanding of medical illness and treatment plan: In process of speaking with LVAD coordinator for education on LVAD management.      MSE:  Pt seen sitting in chair.  A&Ox3. Well related with good eye contact.  Thought process goal directed.  No abnormal thought content; denies s/i.  Mood: improved. Affect: full range.  Insight and judgment adequate.      DX: systolic heart failure; s/p LVAD; r/o Adjustment disorder with anxiety and depressed mood Behavioral Cardiology Progress Note     HPI: Pt with advanced heart failure, s/p LVAD implant     Behavioral Health Assessment:     Mood: "I'm feeling good today."    Sleep: Slept well last night.           Current stressors:   Post-surgical recovery/adjustment to LVAD     Support system/family support: Good support (close friend and brother)      Coping strategies: Talking with staff, good sense of humor, talking with close friend and brother     Understanding of medical illness and treatment plan: In process of speaking with LVAD coordinator for education on LVAD management.      MSE:  Pt seen sitting in chair.  A&Ox2-3 (could not identify month).  Well related with good eye contact.  Thought process goal directed.  No abnormal thought content; denies s/i.  Mood: improved. Affect: full range.  Insight and judgment adequate.      DX: systolic heart failure; s/p LVAD; r/o Adjustment disorder with anxiety and depressed mood

## 2017-06-21 NOTE — PROGRESS NOTE ADULT - ASSESSMENT
68y/o M with advanced NICM BiV HFrEF 15-20% on a stable dose of milrinone at 3mcg/kg/min via PICC s/p AICD, with replacement from [facility] to [employer] (2009), PAF on AC and h/o VT, with recent admission for AICD firing, with decompensated heart failure s/p LVAD on 6/12 post op with VT and Ken 68y/o M with advanced NICM BiV HFrEF 15-20% on a stable dose of milrinone at 3mcg/kg/min via PICC s/p AICD, with replacement from [facility] to [employer] (2009), PAF on AC and h/o VT, with recent admission for AICD firing, with decompensated heart failure s/p LVAD on 6/12 post op with VT and NORMAN( 2.8 and now down to 2.0)

## 2017-06-21 NOTE — PROGRESS NOTE ADULT - PROBLEM SELECTOR PLAN 3
No programming changes.  Continue aspirin 81 mg daily.  On heparin gtt with aPTT 60s (goal 50-70)  Coumadin dosing tonight

## 2017-06-21 NOTE — PROGRESS NOTE ADULT - SUBJECTIVE AND OBJECTIVE BOX
[facility] DIVISION OF KIDNEY DISEASES AND HYPERTENSION -- [phone]   FOLLOW UP NOTE  --------------------------------------------------------------------------------  Chief Complaint: Heart failure      24hour events/Subjective: PT seen and examined. Pt states he feels well, +sob.  No recurrent fevers overnight.         PAST HISTORY  --------------------------------------------------------------------------------  No significant changes to PMH, PSH, FHx, SHx, unless otherwise noted    ALLERGIES & MEDICATIONS  --------------------------------------------------------------------------------  Allergies    No Known Allergies    Intolerances      Standing Inpatient Medications  sodium chloride 0.9%. 1000milliLiter(s) IV Continuous <Continuous>  docusate sodium 100milliGRAM(s) Oral three times a day  dextrose 5%. 1000milliLiter(s) IV Continuous <Continuous>  dextrose 50% Injectable 25Gram(s) IV Push once  pantoprazole  Injectable 40milliGRAM(s) IV Push every 24 hours  milrinone Infusion 0.25MICROgram(s)/kG/Min IV Continuous <Continuous>  aspirin  chewable 81milliGRAM(s) Oral daily  amiodarone    Tablet 200milliGRAM(s) Oral daily  insulin lispro (HumaLOG) corrective regimen sliding scale  SubCutaneous three times a day before meals  insulin lispro (HumaLOG) corrective regimen sliding scale  SubCutaneous at bedtime  heparin  Infusion 1000Unit(s)/Hr IV Continuous <Continuous>    PRN Inpatient Medications      REVIEW OF SYSTEMS  --------------------------------------------------------------------------------  As above         VITALS/PHYSICAL EXAM  --------------------------------------------------------------------------------  T(C): 36.8, Max: 37.9 (06-20 @ 12:00)  HR: 109 (104 - 113)  BP: --  RR: 18 (15 - 29)  SpO2: 100% (93% - 100%)  Wt(kg): --      I & Os for 24h ending 06-21-17 @ 07:00  =============================================  IN: 1625.4 ml / OUT: 1745 ml / NET: -119.6 ml    I & Os for current day (as of 06-21-17 @ 08:40)  =============================================  IN: 50.6 ml / OUT: 320 ml / NET: -269.4 ml    Physical Exam:  	Gen: NAD  	HEENT: MMM  	Pulm: CTA B/L  	CV: S1S2  	Abd: Soft, +BS  	Ext: + LE edema B/L                      Neuro: Awake   	Skin: Warm and Dry   	Other : +emily    LABS/STUDIES  --------------------------------------------------------------------------------              8.7    24.3  >-----------<  223      [06-21-17 @ 01:26]              27.1     133  |  98  |  52  ----------------------------<  104      [06-21-17 @ 01:26]  4.5   |  22  |  2.06        Ca     8.2     [06-21-17 @ 01:26]    TPro  5.8  /  Alb  2.7  /  TBili  3.8  /  DBili  x   /  AST  101  /  ALT  51  /  AlkPhos  169  [06-21-17 @ 01:26]    PT/INR: PT 12.9 , INR 1.18       [06-21-17 @ 04:18]  PTT: 62.5       [06-21-17 @ 04:18]          [06-21-17 @ 01:26]    Creatinine Trend:  SCr 2.06 [06-21 @ 01:26]  SCr 1.69 [06-20 @ 01:15]  SCr 2.39 [06-19 @ 00:36]  SCr 2.64 [06-18 @ 00:20]  SCr 2.60 [06-17 @ 03:06]    Urinalysis - [06-19-17 @ 10:01]      Color Yellow / Appearance SL Turbid / SG 1.014 / pH 6.0      Gluc Negative / Ketone Negative  / Bili Negative / Urobili 1       Blood Moderate / Protein 30 / Leuk Est Negative / Nitrite Negative      RBC 25-50 / WBC 0-2 / Hyaline 2-5 / Gran  / Sq Epi  / Non Sq Epi Occasional / Bacteria Negative      Iron 20, TIBC 352, %sat 6      [06-08-17 @ 07:14]  Ferritin 121.0      [06-09-17 @ 19:08]  HbA1c 5.5      [06-07-17 @ 06:14]  TSH 2.33      [06-14-17 @ 14:51]  Lipid: chol 62, TG 33, HDL 17, LDL 38      [06-07-17 @ 06:14]

## 2017-06-21 NOTE — PROGRESS NOTE ADULT - PROBLEM SELECTOR PLAN 6
Antibiotics were discontinued yesterday as cultures have been negative. Leukocytosis slightly worsening today without fevers  Will monitor closely

## 2017-06-21 NOTE — PROGRESS NOTE ADULT - PROBLEM SELECTOR PLAN 1
hemodynamically mediated in setting of decompensated heart failure +/- urinary retention component and hypotension.  Pt with uptrending SCr to 2.06  today (from 1.69  yesterday) likely in setting of SIRS. Pt with  good urine output(1650).   Monitor BMP  Strict I/O, avoid nephrotoxics, NSAIDs, RCA. Renal dose of medications.

## 2017-06-21 NOTE — PROGRESS NOTE ADULT - SUBJECTIVE AND OBJECTIVE BOX
Doing well this morning with some intermittent mild confusion. He has complaints and says that he feels well. He has had no recurrent fevers.       Medications:  sodium chloride 0.9%. 1000milliLiter(s) IV Continuous <Continuous>  docusate sodium 100milliGRAM(s) Oral three times a day  pantoprazole  Injectable 40milliGRAM(s) IV Push every 24 hours  milrinone Infusion 0.25MICROgram(s)/kG/Min IV Continuous <Continuous>  aspirin  chewable 81milliGRAM(s) Oral daily  amiodarone    Tablet 200milliGRAM(s) Oral daily  insulin lispro (HumaLOG) corrective regimen sliding scale  SubCutaneous three times a day before meals  insulin lispro (HumaLOG) corrective regimen sliding scale  SubCutaneous at bedtime  heparin  Infusion 1000Unit(s)/Hr IV Continuous <Continuous>    Vitals:  T(C): 36.1, Max: 37.9 (06-20 @ 12:00)  HR: 106 (104 - 115)  ABP: 90/78 (77/69 - 98/87)  ABP(mean): 84 (73 - 92)  RR: 23 (15 - 29)  SpO2: 96% (93% - 100%)     Daily Weight in k.5 (2017 00:00)    I&O's Summary    I & Os for current day (as of 2017 07:38)  =============================================  IN: 1625.4 ml / OUT: 1745 ml / NET: -119.6 ml    I&O's Detail    I & Os for current day (as of 2017 07:38)  =============================================  IN:    Nepro with Carb Steady: 775 ml    Oral Fluid: 200 ml    Enteral Tube Flush: 186 ml    heparin Infusion: 160 ml    milrinone  Infusion: 106.4 ml    sodium chloride 0.9%.: 90 ml    milrinone  Infusion: 42 ml    heparin Infusion: 40.5 ml    heparin Infusion: 25.5 ml    Total IN: 1625.4 ml  ---------------------------------------------  OUT:    Voided: 1050 ml    Indwelling Catheter - Urethral: 605 ml    Chest Tube: 90 ml    Total OUT: 1745 ml  ---------------------------------------------  Total NET: -119.6 ml      Physical Exam:     General: No distress. Comfortable.  HEENT: EOM intact.  Neck: Neck supple. JVP mildly elevated. No masses  Chest: Decreased breath sounds at left lung base.   CV: Typical LVAD sounds. No distal pulses. Normal S1 and S2. 2 + edema in lower legs.  Abdomen: Soft, non-distended, non-tender  Driveline exit site: Clean, dry, and intact  Skin: No rashes or skin breakdown  Neurology: Alert and oriented times three. Sensation intact  Psych: Affect normal    LVAD Interrogation: Heart Mate II  RPMs: 9200  Power: 5.8  Flow: 5.2  PI: 6.9  Event: None  No programming changes were made    Labs:                        8.7    24.3  )-----------( 223      ( 2017 01:26 )             27.1         133<L>  |  98  |  52<H>  ----------------------------<  104<H>  4.5   |  22  |  2.06<H>    Ca    8.2<L>      2017 01:26    TPro  5.8<L>  /  Alb  2.7<L>  /  TBili  3.8<H>  /  DBili  x   /  AST  101<H>  /  ALT  51<H>  /  AlkPhos  169<H>      PT/INR - ( 2017 04:18 )   PT: 12.9 sec;   INR: 1.18 ratio         PTT - ( 2017 04:18 )  PTT:62.5 sec      Lactate Dehydrogenase, Serum: 473 U/L ( @ 01:26)  Lactate Dehydrogenase, Serum: 389 U/L ( @ 01:15)  Lactate Dehydrogenase, Serum: 609 U/L ( @ 00:36)

## 2017-06-21 NOTE — PROGRESS NOTE ADULT - ASSESSMENT
67 year old man with ACC/AHA stage D congestive heart failure with severely reduced LV systolic function due to a nonischemic dilated cardiomyopathy s/p HM2 LVAD on 6/12 with post-operative course complicated by hemodynamically but self-terminating ventricular tachycardia and acute renal failure. His inotropes are gradually being weaned. His renal function is improving. 67 year old man with ACC/AHA stage D congestive heart failure with severely reduced LV systolic function due to a nonischemic dilated cardiomyopathy s/p HM2 LVAD on 6/12 with post-operative course complicated by hemodynamically but self-terminating ventricular tachycardia and acute renal failure. He continues to improve functionally on reduced dose milrinone. He has no evidence of significant RV failure but his renal function is slightly worse today.

## 2017-06-21 NOTE — PROVIDER CONTACT NOTE (OTHER) - ASSESSMENT
No s/s of acute confusion. pt oriented, 100% on 5L N/C, hemodynamically stable, calm at times, but very hostile and uncooperative when RN tries to perform care and educate.

## 2017-06-21 NOTE — PROGRESS NOTE ADULT - PROBLEM SELECTOR PLAN 4
Renal function slightly worse today, unclear etiology  Volume management as above  Urine output adequate

## 2017-06-21 NOTE — PROGRESS NOTE ADULT - PROBLEM SELECTOR PLAN 1
He has edema on exam, but JVP does not appear grossly elevated at this point  Given worsening renal function, would give diuretics as needed to keep even  MAP at goal with hydralazine 25 mg PO TID

## 2017-06-22 LAB
ALBUMIN SERPL ELPH-MCNC: 2.5 G/DL — LOW (ref 3.3–5)
ALP SERPL-CCNC: 144 U/L — HIGH (ref 40–120)
ALT FLD-CCNC: 46 U/L RC — HIGH (ref 10–45)
ANION GAP SERPL CALC-SCNC: 12 MMOL/L — SIGNIFICANT CHANGE UP (ref 5–17)
APTT BLD: 59.2 SEC — HIGH (ref 27.5–37.4)
AST SERPL-CCNC: 80 U/L — HIGH (ref 10–40)
BILIRUB SERPL-MCNC: 3.3 MG/DL — HIGH (ref 0.2–1.2)
BUN SERPL-MCNC: 55 MG/DL — HIGH (ref 7–23)
CALCIUM SERPL-MCNC: 8.3 MG/DL — LOW (ref 8.4–10.5)
CHLORIDE SERPL-SCNC: 101 MMOL/L — SIGNIFICANT CHANGE UP (ref 96–108)
CO2 SERPL-SCNC: 23 MMOL/L — SIGNIFICANT CHANGE UP (ref 22–31)
CREAT ?TM UR-MCNC: 32 MG/DL — SIGNIFICANT CHANGE UP
CREAT SERPL-MCNC: 1.94 MG/DL — HIGH (ref 0.5–1.3)
CULTURE RESULTS: SIGNIFICANT CHANGE UP
CULTURE RESULTS: SIGNIFICANT CHANGE UP
GAS PNL BLDA: SIGNIFICANT CHANGE UP
GLUCOSE SERPL-MCNC: 121 MG/DL — HIGH (ref 70–99)
HAPTOGLOB SERPL-MCNC: 160 MG/DL — SIGNIFICANT CHANGE UP (ref 34–200)
HCT VFR BLD CALC: 24.4 % — LOW (ref 39–50)
HGB BLD-MCNC: 7.9 G/DL — LOW (ref 13–17)
INR BLD: 1.33 RATIO — HIGH (ref 0.88–1.16)
LDH SERPL L TO P-CCNC: 419 U/L — HIGH (ref 50–242)
MCHC RBC-ENTMCNC: 30.6 PG — SIGNIFICANT CHANGE UP (ref 27–34)
MCHC RBC-ENTMCNC: 32.3 GM/DL — SIGNIFICANT CHANGE UP (ref 32–36)
MCV RBC AUTO: 94.7 FL — SIGNIFICANT CHANGE UP (ref 80–100)
MYOGLOBIN UR-MCNC: 123 MCG/L — HIGH
PLATELET # BLD AUTO: 261 K/UL — SIGNIFICANT CHANGE UP (ref 150–400)
POTASSIUM SERPL-MCNC: 4.5 MMOL/L — SIGNIFICANT CHANGE UP (ref 3.5–5.3)
POTASSIUM SERPL-SCNC: 4.5 MMOL/L — SIGNIFICANT CHANGE UP (ref 3.5–5.3)
PROCALCITONIN SERPL-MCNC: 0.79 NG/ML — HIGH (ref 0–0.04)
PROT ?TM UR-MCNC: 53 MG/DL — HIGH (ref 0–12)
PROT SERPL-MCNC: 5.9 G/DL — LOW (ref 6–8.3)
PROT/CREAT UR-RTO: 1.7 RATIO — HIGH (ref 0–0.2)
PROTHROM AB SERPL-ACNC: 14.4 SEC — HIGH (ref 9.8–12.7)
RBC # BLD: 2.57 M/UL — LOW (ref 4.2–5.8)
RBC # FLD: 22.1 % — HIGH (ref 10.3–14.5)
SODIUM SERPL-SCNC: 136 MMOL/L — SIGNIFICANT CHANGE UP (ref 135–145)
SPECIMEN SOURCE: SIGNIFICANT CHANGE UP
SPECIMEN SOURCE: SIGNIFICANT CHANGE UP
WBC # BLD: 24.6 K/UL — HIGH (ref 3.8–10.5)
WBC # FLD AUTO: 24.6 K/UL — HIGH (ref 3.8–10.5)

## 2017-06-22 PROCEDURE — 93750 INTERROGATION VAD IN PERSON: CPT

## 2017-06-22 PROCEDURE — 71010: CPT | Mod: 26

## 2017-06-22 PROCEDURE — 99233 SBSQ HOSP IP/OBS HIGH 50: CPT | Mod: GC

## 2017-06-22 PROCEDURE — 71010: CPT | Mod: 26,77

## 2017-06-22 PROCEDURE — 99292 CRITICAL CARE ADDL 30 MIN: CPT

## 2017-06-22 PROCEDURE — 99233 SBSQ HOSP IP/OBS HIGH 50: CPT | Mod: 25

## 2017-06-22 PROCEDURE — 99291 CRITICAL CARE FIRST HOUR: CPT

## 2017-06-22 RX ORDER — WARFARIN SODIUM 2.5 MG/1
4 TABLET ORAL ONCE
Qty: 0 | Refills: 0 | Status: COMPLETED | OUTPATIENT
Start: 2017-06-22 | End: 2017-06-22

## 2017-06-22 RX ORDER — FUROSEMIDE 40 MG
20 TABLET ORAL ONCE
Qty: 0 | Refills: 0 | Status: COMPLETED | OUTPATIENT
Start: 2017-06-22 | End: 2017-06-22

## 2017-06-22 RX ORDER — FUROSEMIDE 40 MG
40 TABLET ORAL
Qty: 0 | Refills: 0 | Status: DISCONTINUED | OUTPATIENT
Start: 2017-06-22 | End: 2017-06-23

## 2017-06-22 RX ORDER — ALBUMIN HUMAN 25 %
250 VIAL (ML) INTRAVENOUS ONCE
Qty: 0 | Refills: 0 | Status: DISCONTINUED | OUTPATIENT
Start: 2017-06-22 | End: 2017-06-22

## 2017-06-22 RX ORDER — QUETIAPINE FUMARATE 200 MG/1
25 TABLET, FILM COATED ORAL
Qty: 0 | Refills: 0 | Status: DISCONTINUED | OUTPATIENT
Start: 2017-06-22 | End: 2017-06-24

## 2017-06-22 RX ADMIN — FAMOTIDINE 20 MILLIGRAM(S): 10 INJECTION INTRAVENOUS at 11:52

## 2017-06-22 RX ADMIN — Medication 100 MILLIGRAM(S): at 22:44

## 2017-06-22 RX ADMIN — Medication 20 MILLIGRAM(S): at 22:53

## 2017-06-22 RX ADMIN — Medication 81 MILLIGRAM(S): at 11:51

## 2017-06-22 RX ADMIN — Medication 50 MILLIEQUIVALENT(S): at 20:30

## 2017-06-22 RX ADMIN — Medication 40 MILLIGRAM(S): at 17:51

## 2017-06-22 RX ADMIN — MILRINONE LACTATE 5.62 MICROGRAM(S)/KG/MIN: 1 INJECTION, SOLUTION INTRAVENOUS at 06:44

## 2017-06-22 RX ADMIN — AMIODARONE HYDROCHLORIDE 200 MILLIGRAM(S): 400 TABLET ORAL at 05:23

## 2017-06-22 RX ADMIN — QUETIAPINE FUMARATE 25 MILLIGRAM(S): 200 TABLET, FILM COATED ORAL at 17:51

## 2017-06-22 RX ADMIN — Medication 40 MILLIGRAM(S): at 08:03

## 2017-06-22 RX ADMIN — HEPARIN SODIUM 10 UNIT(S)/HR: 5000 INJECTION INTRAVENOUS; SUBCUTANEOUS at 06:44

## 2017-06-22 RX ADMIN — WARFARIN SODIUM 4 MILLIGRAM(S): 2.5 TABLET ORAL at 22:44

## 2017-06-22 RX ADMIN — QUETIAPINE FUMARATE 25 MILLIGRAM(S): 200 TABLET, FILM COATED ORAL at 13:04

## 2017-06-22 NOTE — CHART NOTE - NSCHARTNOTEFT_GEN_A_CORE
Source: Patient [ ]    Family [ ]     other [ ]    Diet :       Patient reports [ ] nausea  [ ] vomiting [ ] diarrhea [ ] constipation  [ ]chewing problems [ ] swallowing issues  [ ] other:      PO intake:  < 50% [ ] 50-75% [ ]   % [ ]  other :     Source for PO intake [ ] Patient [ ] family [ ] chart [ ] staff [ ] other     Enteral /Parenteral Nutrition:       Current Weight:   % Weight Change    Pertinent Medications: MEDICATIONS  (STANDING):  sodium chloride 0.9%. 1000milliLiter(s) IV Continuous <Continuous>  docusate sodium 100milliGRAM(s) Oral three times a day  dextrose 5%. 1000milliLiter(s) IV Continuous <Continuous>  dextrose 50% Injectable 25Gram(s) IV Push once  milrinone Infusion 0.25MICROgram(s)/kG/Min IV Continuous <Continuous>  aspirin  chewable 81milliGRAM(s) Oral daily  amiodarone    Tablet 200milliGRAM(s) Oral daily  insulin lispro (HumaLOG) corrective regimen sliding scale  SubCutaneous three times a day before meals  insulin lispro (HumaLOG) corrective regimen sliding scale  SubCutaneous at bedtime  heparin  Infusion 1000Unit(s)/Hr IV Continuous <Continuous>  famotidine    Tablet 20milliGRAM(s) Oral daily  furosemide   Injectable 40milliGRAM(s) IV Push <User Schedule>  warfarin 4milliGRAM(s) Oral once  QUEtiapine 25milliGRAM(s) Oral two times a day    MEDICATIONS  (PRN):    Pertinent Labs:      Hemoglobin: 7.9 g/dL (06.22.17 @ 02:01)  Hematocrit: 24.8 % (06.15.17 @ 14:10)  Basic Metabolic Panel (06.14.17 @ 23:41)    Sodium, Serum: 132 mmol/L    Potassium, Serum: 4.6 mmol/L    Chloride, Serum: 94 mmol/L    Carbon Dioxide, Serum: 19 mmol/L    Anion Gap, Serum: 19 mmol/L    Blood Urea Nitrogen, Serum: 28 mg/dL    Creatinine, Serum: 2.23 mg/dL    Glucose, Serum: 102 mg/dL    Calcium, Total Serum: 9.0 mg/dL       Skin:     Estimated Needs:   [ ] no change since previous assessment  [ ] recalculated:       Previous Nutrition Diagnosis:     [ ] Inadequate Energy Intake [ ]Inadequate Oral Intake [ ] Excessive Energy Intake     [ ] Underweight [ ] Increased Nutrient Needs [ ] Overweight/Obesity     [ ] Altered GI Function [ ] Unintended Weight Loss [ ] Food & Nutrition Related Knowledge Deficit [ ] Malnutrition          Nutrition Diagnosis is [ ] ongoing  [ ] resolved [ ] not applicable          New Nutrition Diagnosis: [ ] not applicable    [ ] Inadequate Protein Energy Intake [ ]Inadequate Oral Intake [ ] Excessive Energy Intake     [ ] Underweight [ ] Increased Nutrient Needs [ ] Overweight/Obesity     [ ] Altered GI Function [ ] Unintended Weight Loss [ ] Food & Nutrition Related Knowledge Deficit[ ] Limited Adherence to nutrition related recommendations [ ] Malnutrition  [ ] other: Free text       Related to:      As evidenced by:      Interventions:     Recommend    [ ] Change Diet To:    [ ] Nutrition Supplement    [ ] Nutrition Support    [ ] Other:        Monitoring and Evaluation:     [ ] PO intake [ ] Tolerance to diet prescription [ ] weights [ ] follow up per protocol    [ ] other: Source: Patient [ X]    Family [ ]     other [ X] RN, Medical record     Per chart, Pt with Acute on chronic systolic congestive HF s/p LVAD, with NORMAN off CVVHD with renal function slighting improved. Pt was very tired at time of interview, RD reviewed Low Na diet education, and relationship to health and disease. Will follow up with further education as Pt amenable.     Diet : Low Na with Ensure pudding x3 with supplemental EN via NGT: Nepro @ 25ml/[id number] (1080kcals and 48gm protein),       Patient reports [ ] nausea  [ ] vomiting [ ] diarrhea [ ] constipation  [ ]chewing problems [ ] swallowing issues  [ X] other: Pt reports 1 BM last night and no GI distress.      PO intake:  < 50% [ ] 50-75% [ ]   % [ ]  other : Noted Pt a on a 3days calorie count; due 6/23/17. Pt states PO intake was 100% at breakfast and 50% at lunch.      Source for PO intake [X ] Patient [ ] family [ X] chart [ ] staff [ ] other     Enteral /Parenteral Nutrition: Nepro @ 25ml/[id number] (1080kcals and 48gm protein), tolerating well.       Current Weight: 76.8kg. fluctuations noted.   % Weight Change    Pertinent Medications: MEDICATIONS  (STANDING):  sodium chloride 0.9%. 1000milliLiter(s) IV Continuous <Continuous>  docusate sodium 100milliGRAM(s) Oral three times a day  dextrose 5%. 1000milliLiter(s) IV Continuous <Continuous>  dextrose 50% Injectable 25Gram(s) IV Push once  milrinone Infusion 0.25MICROgram(s)/kG/Min IV Continuous <Continuous>  aspirin  chewable 81milliGRAM(s) Oral daily  amiodarone    Tablet 200milliGRAM(s) Oral daily  insulin lispro (HumaLOG) corrective regimen sliding scale  SubCutaneous three times a day before meals  insulin lispro (HumaLOG) corrective regimen sliding scale  SubCutaneous at bedtime  heparin  Infusion 1000Unit(s)/Hr IV Continuous <Continuous>  famotidine    Tablet 20milliGRAM(s) Oral daily  furosemide   Injectable 40milliGRAM(s) IV Push <User Schedule>  warfarin 4milliGRAM(s) Oral once  QUEtiapine 25milliGRAM(s) Oral two times a day    MEDICATIONS  (PRN):    Pertinent Labs:    Hgb/Hct:7.9/24.4-low, , Na:136, K:4.5, Cl:101, BUN:55-high, Cr:1/94-high, Glucose:121-high, Ca:8.3-low, Total Protein:2.5-low, Albumin:2.5-low, Total Bilirubin:3.3-high, AlkPhos:144-high, AST:80-high, ALT:46-high,     Skin: Intact, +1 generalized, +2 tara leg edema.    Estimated Needs:   [ X] no change since previous assessment  [ ] recalculated:       Previous Nutrition Diagnosis:     [ ] Inadequate Energy Intake [ ]Inadequate Oral Intake [ ] Excessive Energy Intake     [ ] Underweight [ ] Increased Nutrient Needs [ ] Overweight/Obesity     [ ] Altered GI Function [ ] Unintended Weight Loss [ ] Food & Nutrition Related Knowledge Deficit [ X] Malnutrition          Nutrition Diagnosis is [X ] ongoing  [ ] resolved [ ] not applicable          New Nutrition Diagnosis: [X ] not applicable       Interventions:     Recommend    [ ] Change Diet To:    [X ] Nutrition Supplement: recommend to continue Ensure pudding x3, will change to chocolate per Pt's preferences.     [X ] Nutrition Support: Recommend to continue Nepro @ 25ml/[id number] at this time.    [X ] Other: RD will follow up with calorie count tomorrow 6/23 as per protocol.        Monitoring and Evaluation:     [X ] PO intake [ X] Tolerance to diet prescription [ X] weights [ X] follow up per protocol    [ X] other: RD remains available Sarah Siegler RD. Pager #[phone]

## 2017-06-22 NOTE — PROGRESS NOTE ADULT - PROBLEM SELECTOR PLAN 3
No programming changes.  Continue aspirin 81 mg daily.  On heparin gtt with aPTT 60s (goal 50-70)  Coumadin dosing tonight with 4 mg  LDH improving

## 2017-06-22 NOTE — PROGRESS NOTE ADULT - ATTENDING COMMENTS
NORMAN on CKD post LVAD now. No obvious worsening right sided CHF, no JVP and edema not significant. 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  AIN from PPI possible. Would dc Protonix and change to pepcid.  No longer on lasix( but can get it if needed by CHF tem)  Continue on milrinone per CTICU/Heart failure  Check UA, prt/crt ratio given there was some protein.  Pt has hyaline casts and likely this is suggestive of a low flow state. Check urine Na as well today.   Hypokalemia: noted  received K supplements    Sarkis Hutchins MD  Cell   Pager   Office  NORMAN on CKD post LVAD: Likely in the setting of cardiogenic shock   Renal function is stable  Monitor renal function closely as he has had urinary retention in the past and his diaz has been removed.   Continue with Diuresis as he is still volume overloaded on exam   Proteinuria of 1.7 gms noted. Recommend check SPEP/SIFE/Immunoglobulin light chains  Anemia: pRBC per primary team  No evidence of hemolysis     Godwin Lucia MD

## 2017-06-22 NOTE — PROGRESS NOTE ADULT - ASSESSMENT
67 year old man with ACC/AHA stage D congestive heart failure with severely reduced LV systolic function due to a nonischemic dilated cardiomyopathy s/p HM2 LVAD on 6/12 with post-operative course complicated by hemodynamically but self-terminating ventricular tachycardia and acute renal failure. He continues to improve functionally on reduced dose milrinone. He continues to improve slowly.

## 2017-06-22 NOTE — PROGRESS NOTE ADULT - PROBLEM SELECTOR PLAN 5
Antibiotics were discontinued as cultures have been negative.   Leukocytosis persistent  Will monitor closely

## 2017-06-22 NOTE — PROGRESS NOTE ADULT - SUBJECTIVE AND OBJECTIVE BOX
Health system DIVISION OF KIDNEY DISEASES AND HYPERTENSION -- [phone]   FOLLOW UP NOTE  --------------------------------------------------------------------------------  Chief Complaint: Heart failure      24hour events/Subjective: Pt seen and examined. Pt sitting up in chair. Denies sob/cp/ urinary complaints. Diaz removed yesterday and pt voided.         PAST HISTORY  --------------------------------------------------------------------------------  No significant changes to PMH, PSH, FHx, SHx, unless otherwise noted    ALLERGIES & MEDICATIONS  --------------------------------------------------------------------------------  Allergies    No Known Allergies    Intolerances      Standing Inpatient Medications  sodium chloride 0.9%. 1000milliLiter(s) IV Continuous <Continuous>  docusate sodium 100milliGRAM(s) Oral three times a day  dextrose 5%. 1000milliLiter(s) IV Continuous <Continuous>  dextrose 50% Injectable 25Gram(s) IV Push once  milrinone Infusion 0.25MICROgram(s)/kG/Min IV Continuous <Continuous>  aspirin  chewable 81milliGRAM(s) Oral daily  amiodarone    Tablet 200milliGRAM(s) Oral daily  insulin lispro (HumaLOG) corrective regimen sliding scale  SubCutaneous three times a day before meals  insulin lispro (HumaLOG) corrective regimen sliding scale  SubCutaneous at bedtime  heparin  Infusion 1000Unit(s)/Hr IV Continuous <Continuous>  famotidine    Tablet 20milliGRAM(s) Oral daily  furosemide   Injectable 40milliGRAM(s) IV Push <User Schedule>  warfarin 4milliGRAM(s) Oral once    PRN Inpatient Medications      REVIEW OF SYSTEMS  --------------------------------------------------------------------------------  As above         VITALS/PHYSICAL EXAM  --------------------------------------------------------------------------------  T(C): 36.4, Max: 37.1 (06-21 @ 16:00)  HR: 118 (94 - 118)  BP: --  RR: 17 (12 - 28)  SpO2: 100% (96% - 100%)  Wt(kg): --      I & Os for 24h ending 06-22-17 @ 07:00  =============================================  IN: 1610.4 ml / OUT: 3720 ml / NET: -2109.6 ml    I & Os for current day (as of 06-22-17 @ 08:43)  =============================================  IN: 40.6 ml / OUT: 200 ml / NET: -159.4 ml    Physical Exam:  	Gen: NAD, sitting up in chair   	HEENT: MMM  	Pulm: CTA B/L  	CV: S1S2  	Abd: Soft, +BS  	Ext: + LE edema B/L                      Neuro: Awake   	Skin: Warm and Dry   	Other ; no diaz    LABS/STUDIES  --------------------------------------------------------------------------------              7.9    24.6  >-----------<  261      [06-22-17 @ 02:01]              24.4     136  |  101  |  55  ----------------------------<  121      [06-22-17 @ 02:01]  4.5   |  23  |  1.94        Ca     8.3     [06-22-17 @ 02:01]    TPro  5.9  /  Alb  2.5  /  TBili  3.3  /  DBili  x   /  AST  80  /  ALT  46  /  AlkPhos  144  [06-22-17 @ 02:01]    PT/INR: PT 14.4 , INR 1.33       [06-22-17 @ 02:01]  PTT: 59.2       [06-22-17 @ 02:01]          [06-22-17 @ 02:01]    Creatinine Trend:  SCr 1.94 [06-22 @ 02:01]  SCr 2.06 [06-21 @ 01:26]  SCr 1.69 [06-20 @ 01:15]  SCr 2.39 [06-19 @ 00:36]  SCr 2.64 [06-18 @ 00:20]    Urinalysis - [06-21-17 @ 18:17]      Color Yellow / Appearance SL Turbid / SG 1.010 / pH 7.0      Gluc Negative / Ketone Negative  / Bili Negative / Urobili 4       Blood Small / Protein 30 / Leuk Est Moderate / Nitrite Negative      RBC 3-5 / WBC 6-10 / Hyaline  / Gran  / Sq Epi  / Non Sq Epi OCC / Bacteria Few    Urine Creatinine 32      [06-21-17 @ 22:07]  Urine Protein 53      [06-21-17 @ 22:07]  Urine Sodium 42      [06-21-17 @ 18:17]  Urine Chloride 46      [06-21-17 @ 18:17]    Iron 20, TIBC 352, %sat 6      [06-08-17 @ 07:14]  Ferritin 121.0      [06-09-17 @ 19:08]  HbA1c 5.5      [06-07-17 @ 06:14]  TSH 2.33      [06-14-17 @ 14:51]  Lipid: chol 62, TG 33, HDL 17, LDL 38      [06-07-17 @ 06:14]

## 2017-06-22 NOTE — PROGRESS NOTE ADULT - PROBLEM SELECTOR PLAN 1
hemodynamically mediated in setting of decompensated heart failure +/- urinary retention component and hypotension.  Pt with improving  SCr to 1.94   today (from 2.06  yesterday).  Pt with  good urine output.   Monitor BMP  Strict I/O, avoid nephrotoxics, NSAIDs, RCA. Renal dose of medications.

## 2017-06-22 NOTE — PROVIDER CONTACT NOTE (OTHER) - ACTION/TREATMENT ORDERED:
As per NP Uday, continue to monitor, no further interventions. Pt's po2 has been running this way as per NP.

## 2017-06-22 NOTE — PROGRESS NOTE ADULT - SUBJECTIVE AND OBJECTIVE BOX
He feels well this morning, but complains of a cough. He has not had any fevers. No abdominal pain. He continues to have brief episodes of confusion. Sitting in chair this morning. All chest tubes are out.       Medications:  sodium chloride 0.9%. 1000milliLiter(s) IV Continuous <Continuous>  docusate sodium 100milliGRAM(s) Oral three times a day  dextrose 5%. 1000milliLiter(s) IV Continuous <Continuous>  dextrose 50% Injectable 25Gram(s) IV Push once  milrinone Infusion 0.25MICROgram(s)/kG/Min IV Continuous <Continuous>  aspirin  chewable 81milliGRAM(s) Oral daily  amiodarone    Tablet 200milliGRAM(s) Oral daily  insulin lispro (HumaLOG) corrective regimen sliding scale  SubCutaneous three times a day before meals  insulin lispro (HumaLOG) corrective regimen sliding scale  SubCutaneous at bedtime  heparin  Infusion 1000Unit(s)/Hr IV Continuous <Continuous>  famotidine    Tablet 20milliGRAM(s) Oral daily      Vitals:  T(C): 36.4, Max: 37.1 (-21 @ 16:00)  HR: 116 (94 - 116)  ABP: 87/77 (67/62 - 110/86)  ABP(mean): 83 (65 - 95)  RR: 22 (12 - 28)  SpO2: 98% (96% - 100%)    Daily Weight in k.8 (2017 00:00)    I&O's Summary    I & Os for current day (as of 2017 07:50)  =============================================  IN: 1610.4 ml / OUT: 3720 ml / NET: -2109.6 ml    I&O's Detail    I & Os for current day (as of 2017 07:50)  =============================================  IN:    Nepro with Carb Steady: 600 ml    Oral Fluid: 566 ml    heparin Infusion: 240 ml    milrinone  Infusion: 134.4 ml    Enteral Tube Flush: 50 ml    Enteral Tube Flush: 20 ml    Total IN: 1610.4 ml  ---------------------------------------------  OUT:    Voided: 3600 ml    Other: 110 ml    Chest Tube: 10 ml    Total OUT: 3720 ml  ---------------------------------------------  Total NET: -2109.6 ml      Physical Exam:     General: No distress. Comfortable.  HEENT: EOM intact.  Neck: Neck supple. JVP moderately elevated. No masses  Chest: Decreased sounds at lung bases.  CV: Typical LVAD sounds. No distal pulses.   Abdomen: Soft, non-distended, non-tender  Driveline exit site: Clean, dry, and intact  Skin: No rashes or skin breakdown  Neurology: Alert and oriented. Sensation intact  Psych: Affect appropriate    LVAD Interrogation: Heart Mate II  RPMs: 9200  Power: 5.4  Flow: 5.5  PI: 5.1  Event: No events. No alarms.  No programming changes were made    Labs:                        7.9    24.6  )-----------( 261      ( 2017 02:01 )             24.4         136  |  101  |  55<H>  ----------------------------<  121<H>  4.5   |  23  |  1.94<H>    Ca    8.3<L>      2017 02:01    TPro  5.9<L>  /  Alb  2.5<L>  /  TBili  3.3<H>  /  DBili  x   /  AST  80<H>  /  ALT  46<H>  /  AlkPhos  144<H>      PT/INR - ( 2017 02:01 )   PT: 14.4 sec;   INR: 1.33 ratio         PTT - ( 2017 02:01 )  PTT:59.2 sec    Lactate Dehydrogenase, Serum: 419 U/L ( @ 02:01)  Lactate Dehydrogenase, Serum: 473 U/L ( @ 01:26)  Lactate Dehydrogenase, Serum: 389 U/L ( @ 01:15)

## 2017-06-22 NOTE — PROGRESS NOTE ADULT - PROBLEM SELECTOR PLAN 1
Give lasix 40 mg IV BID to augment diuresis. He remains volume overloaded, but improving  MAP goal 70-80

## 2017-06-22 NOTE — PROGRESS NOTE ADULT - ASSESSMENT
68y/o M with advanced NICM BiV HFrEF 15-20% on a stable dose of milrinone at 3mcg/kg/min via PICC s/p AICD, with replacement from [facility] to [employer] (2009), PAF on AC and h/o VT, with recent admission for AICD firing, with decompensated heart failure s/p LVAD on 6/12 post op with VT and NORMAN.

## 2017-06-22 NOTE — PROGRESS NOTE ADULT - SUBJECTIVE AND OBJECTIVE BOX
BILLY RUSSELL  MRN#:  [id number]    The patient is a 67y Male who was seen, evaluated, & examined with the CTICU staff on rounds and later in the day with a multidisciplinary care plan formulated & implemented.  All available clinical, laboratory, radiographic, pharmacologic, and electrocardiographic data were reviewed & analyzed.      The patient was in the CTICU in critical condition secondary to persistent cardiopulmonary dysfunction, resolving non-oliguric renal injury, resolving anasarca, and endstage cardiomyopathy-cardiogenic shock-S/P LVAD.      Respiratory status required supplemental oxygen, the following of ABG’s with A-line monitoring, continuous pulse oximetry monitoring, & IV Lasix for support & to evaluate for & prevent further decompensation secondary to persistent cardiopulmonary dysfunction.     Invasive hemodynamic monitoring with an A-line was required for the following of continuous MAP/BP monitoring to ensure adequate cardiovascular support and to evaluate for & help prevent decompensation while receiving intermittent IV Lasix, an IV Heparin drip, and Coumadin loading secondary to resolving non-oliguric renal injury, resolving anasarca, and endstage cardiomyopathy-cardiogenic shock-S/P LVAD.    Patient required more than the usual postoperative critical care management and I provided 80 minutes of non-continuous care to the patient.  Discussed at length with the CTICU staff and helped coordinate care.

## 2017-06-23 DIAGNOSIS — R80.9 PROTEINURIA, UNSPECIFIED: ICD-10-CM

## 2017-06-23 LAB
ALBUMIN SERPL ELPH-MCNC: 2.3 G/DL — LOW (ref 3.3–5)
ALP SERPL-CCNC: 135 U/L — HIGH (ref 40–120)
ALT FLD-CCNC: 43 U/L RC — SIGNIFICANT CHANGE UP (ref 10–45)
ANION GAP SERPL CALC-SCNC: 14 MMOL/L — SIGNIFICANT CHANGE UP (ref 5–17)
APTT BLD: 52.5 SEC — HIGH (ref 27.5–37.4)
AST SERPL-CCNC: 71 U/L — HIGH (ref 10–40)
BILIRUB SERPL-MCNC: 2.5 MG/DL — HIGH (ref 0.2–1.2)
BUN SERPL-MCNC: 52 MG/DL — HIGH (ref 7–23)
CALCIUM SERPL-MCNC: 8.1 MG/DL — LOW (ref 8.4–10.5)
CHLORIDE SERPL-SCNC: 101 MMOL/L — SIGNIFICANT CHANGE UP (ref 96–108)
CO2 SERPL-SCNC: 21 MMOL/L — LOW (ref 22–31)
CREAT SERPL-MCNC: 1.82 MG/DL — HIGH (ref 0.5–1.3)
GAS PNL BLDA: SIGNIFICANT CHANGE UP
GLUCOSE SERPL-MCNC: 105 MG/DL — HIGH (ref 70–99)
HCT VFR BLD CALC: 23.7 % — LOW (ref 39–50)
HGB BLD-MCNC: 7.3 G/DL — LOW (ref 13–17)
INR BLD: 1.74 RATIO — HIGH (ref 0.88–1.16)
INR BLD: 1.88 RATIO — HIGH (ref 0.88–1.16)
MCHC RBC-ENTMCNC: 28.3 PG — SIGNIFICANT CHANGE UP (ref 27–34)
MCHC RBC-ENTMCNC: 30.9 GM/DL — LOW (ref 32–36)
MCV RBC AUTO: 91.4 FL — SIGNIFICANT CHANGE UP (ref 80–100)
PLATELET # BLD AUTO: 282 K/UL — SIGNIFICANT CHANGE UP (ref 150–400)
POTASSIUM SERPL-MCNC: 4.2 MMOL/L — SIGNIFICANT CHANGE UP (ref 3.5–5.3)
POTASSIUM SERPL-SCNC: 4.2 MMOL/L — SIGNIFICANT CHANGE UP (ref 3.5–5.3)
PROT SERPL-MCNC: 5.4 G/DL — LOW (ref 6–8.3)
PROTHROM AB SERPL-ACNC: 19.2 SEC — HIGH (ref 9.8–12.7)
PROTHROM AB SERPL-ACNC: 20.6 SEC — HIGH (ref 9.8–12.7)
RBC # BLD: 2.59 M/UL — LOW (ref 4.2–5.8)
RBC # FLD: 20.8 % — HIGH (ref 10.3–14.5)
SODIUM SERPL-SCNC: 136 MMOL/L — SIGNIFICANT CHANGE UP (ref 135–145)
WBC # BLD: 20.6 K/UL — HIGH (ref 3.8–10.5)
WBC # FLD AUTO: 20.6 K/UL — HIGH (ref 3.8–10.5)

## 2017-06-23 PROCEDURE — 99292 CRITICAL CARE ADDL 30 MIN: CPT

## 2017-06-23 PROCEDURE — 99233 SBSQ HOSP IP/OBS HIGH 50: CPT | Mod: 25,GC

## 2017-06-23 PROCEDURE — 71010: CPT | Mod: 26,76

## 2017-06-23 PROCEDURE — 99233 SBSQ HOSP IP/OBS HIGH 50: CPT | Mod: GC

## 2017-06-23 PROCEDURE — 99291 CRITICAL CARE FIRST HOUR: CPT

## 2017-06-23 PROCEDURE — 93750 INTERROGATION VAD IN PERSON: CPT

## 2017-06-23 RX ORDER — WARFARIN SODIUM 2.5 MG/1
4 TABLET ORAL ONCE
Qty: 0 | Refills: 0 | Status: DISCONTINUED | OUTPATIENT
Start: 2017-06-23 | End: 2017-06-23

## 2017-06-23 RX ORDER — POTASSIUM CHLORIDE 20 MEQ
10 PACKET (EA) ORAL ONCE
Qty: 0 | Refills: 0 | Status: COMPLETED | OUTPATIENT
Start: 2017-06-23 | End: 2017-06-22

## 2017-06-23 RX ORDER — WARFARIN SODIUM 2.5 MG/1
3 TABLET ORAL ONCE
Qty: 0 | Refills: 0 | Status: COMPLETED | OUTPATIENT
Start: 2017-06-23 | End: 2017-06-23

## 2017-06-23 RX ORDER — FUROSEMIDE 40 MG
40 TABLET ORAL THREE TIMES A DAY
Qty: 0 | Refills: 0 | Status: DISCONTINUED | OUTPATIENT
Start: 2017-06-23 | End: 2017-06-24

## 2017-06-23 RX ORDER — AMIODARONE HYDROCHLORIDE 400 MG/1
150 TABLET ORAL ONCE
Qty: 0 | Refills: 0 | Status: COMPLETED | OUTPATIENT
Start: 2017-06-23 | End: 2017-06-23

## 2017-06-23 RX ADMIN — Medication 100 MILLIGRAM(S): at 05:38

## 2017-06-23 RX ADMIN — WARFARIN SODIUM 3 MILLIGRAM(S): 2.5 TABLET ORAL at 21:27

## 2017-06-23 RX ADMIN — AMIODARONE HYDROCHLORIDE 200 MILLIGRAM(S): 400 TABLET ORAL at 05:37

## 2017-06-23 RX ADMIN — Medication 81 MILLIGRAM(S): at 14:51

## 2017-06-23 RX ADMIN — AMIODARONE HYDROCHLORIDE 600 MILLIGRAM(S): 400 TABLET ORAL at 10:16

## 2017-06-23 RX ADMIN — FAMOTIDINE 20 MILLIGRAM(S): 10 INJECTION INTRAVENOUS at 14:51

## 2017-06-23 RX ADMIN — Medication 100 MILLIGRAM(S): at 14:51

## 2017-06-23 RX ADMIN — QUETIAPINE FUMARATE 25 MILLIGRAM(S): 200 TABLET, FILM COATED ORAL at 19:04

## 2017-06-23 RX ADMIN — Medication 100 MILLIGRAM(S): at 21:27

## 2017-06-23 RX ADMIN — QUETIAPINE FUMARATE 25 MILLIGRAM(S): 200 TABLET, FILM COATED ORAL at 05:37

## 2017-06-23 RX ADMIN — Medication 40 MILLIGRAM(S): at 05:37

## 2017-06-23 RX ADMIN — Medication 40 MILLIGRAM(S): at 14:51

## 2017-06-23 RX ADMIN — Medication 40 MILLIGRAM(S): at 21:27

## 2017-06-23 NOTE — PROGRESS NOTE ADULT - ASSESSMENT
66y/o M with advanced NICM BiV HFrEF 15-20% on a stable dose of milrinone at 3mcg/kg/min via PICC s/p AICD, with replacement from [employer] to [employer] (2009), PAF on AC and h/o VT, with recent admission for AICD firing, with decompensated heart failure s/p LVAD on 6/12 post op with VT and NORMAN.

## 2017-06-23 NOTE — PROGRESS NOTE ADULT - PROBLEM SELECTOR PLAN 2
Continue milrinone at 0.25 mcg/kg/min Continue milrinone at 0.25 mcg/kg/min until closer to euvolemia

## 2017-06-23 NOTE — PROGRESS NOTE ADULT - SUBJECTIVE AND OBJECTIVE BOX
Interval History:  No complaints at current.    Medications:  sodium chloride 0.9%. 1000milliLiter(s) IV Continuous <Continuous>  docusate sodium 100milliGRAM(s) Oral three times a day  dextrose 5%. 1000milliLiter(s) IV Continuous <Continuous>  dextrose 50% Injectable 25Gram(s) IV Push once  milrinone Infusion 0.25MICROgram(s)/kG/Min IV Continuous <Continuous>  aspirin  chewable 81milliGRAM(s) Oral daily  amiodarone    Tablet 200milliGRAM(s) Oral daily  insulin lispro (HumaLOG) corrective regimen sliding scale  SubCutaneous three times a day before meals  insulin lispro (HumaLOG) corrective regimen sliding scale  SubCutaneous at bedtime  heparin  Infusion 1000Unit(s)/Hr IV Continuous <Continuous>  famotidine    Tablet 20milliGRAM(s) Oral daily  QUEtiapine 25milliGRAM(s) Oral two times a day  furosemide   Injectable 40milliGRAM(s) IV Push three times a day    Vitals:  T(C): 37, Max: 37 (-23 @ 08:00)  HR: 126 (107 - 130)  ABP: 91/82 (68/61 - 101/88)  ABP(mean): 87 (65 - 94)  RR: 23 (12 - 25)  SpO2: 96% (93% - 100%)    Daily     Daily Weight in k.4 (2017 03:00)        I&O's Summary  I & Os for 24h ending 2017 07:00  =============================================  IN: 1624.4 ml / OUT: 2800 ml / NET: -1175.6 ml    I & Os for current day (as of 2017 11:56)  =============================================  IN: 696 ml / OUT: 275 ml / NET: 421 ml      Physical Exam:  Appearance: No Acute Distress  HEENT: elevated JVP to lower 1/3 of neck  Cardiovascular: Audible S1 S2, +LVAD hum  Respiratory: Clear to auscultation bilaterally  Gastrointestinal: Soft, Non-tender	  Skin: No cyanosis	  Neurologic: Non-focal  Extremities: 2+ LE edema  Psychiatry: Mood & affect appropriate    LVAD Interrogation: HM2  Pump Flow: 4.8  Pump Speed: 9200  Pulse Index: 4.5  Pump Power: 5.4  VAD Events: None for 24hrs  Driveline evaluation: soft, non-tender  Programming Changes: No changes made    Labs:                        7.3    20.6  )-----------( 282      ( 2017 03:03 )             23.7         136  |  101  |  52<H>  ----------------------------<  105<H>  4.2   |  21<L>  |  1.82<H>    Ca    8.1<L>      2017 03:03    TPro  5.4<L>  /  Alb  2.3<L>  /  TBili  2.5<H>  /  DBili  x   /  AST  71<H>  /  ALT  43  /  AlkPhos  135<H>      PT/INR - ( 2017 03:03 )   PT: 19.2 sec;   INR: 1.74 ratio         PTT - ( 2017 03:03 )  PTT:52.5 sec    Lactate Dehydrogenase, Serum: 419 U/L ( @ 02:01)  Lactate Dehydrogenase, Serum: 473 U/L ( @ 01:26)      TELEMETRY: tachycardic, appears to be atrial tach Interval History:  No complaints at current.    Medications:  sodium chloride 0.9%. 1000milliLiter(s) IV Continuous <Continuous>  docusate sodium 100milliGRAM(s) Oral three times a day  dextrose 5%. 1000milliLiter(s) IV Continuous <Continuous>  dextrose 50% Injectable 25Gram(s) IV Push once  milrinone Infusion 0.25MICROgram(s)/kG/Min IV Continuous <Continuous>  aspirin  chewable 81milliGRAM(s) Oral daily  amiodarone    Tablet 200milliGRAM(s) Oral daily  insulin lispro (HumaLOG) corrective regimen sliding scale  SubCutaneous three times a day before meals  insulin lispro (HumaLOG) corrective regimen sliding scale  SubCutaneous at bedtime  heparin  Infusion 1000Unit(s)/Hr IV Continuous <Continuous>  famotidine    Tablet 20milliGRAM(s) Oral daily  QUEtiapine 25milliGRAM(s) Oral two times a day  furosemide   Injectable 40milliGRAM(s) IV Push three times a day    Vitals:  T(C): 37, Max: 37 (-23 @ 08:00)  HR: 126 (107 - 130)  ABP: 91/82 (68/61 - 101/88)  ABP(mean): 87 (65 - 94)  RR: 23 (12 - 25)  SpO2: 96% (93% - 100%)    Daily     Daily Weight in k.4 (2017 03:00)        I&O's Summary  I & Os for 24h ending 2017 07:00  =============================================  IN: 1624.4 ml / OUT: 2800 ml / NET: -1175.6 ml    I & Os for current day (as of 2017 11:56)  =============================================  IN: 696 ml / OUT: 275 ml / NET: 421 ml      Physical Exam:  Appearance: No Acute Distress  HEENT: elevated JVP to lower 1/3 of neck  Cardiovascular: Audible S1 S2, +LVAD hum  Respiratory: Clear to auscultation bilaterally  Gastrointestinal: Soft, Non-tender	  Skin: No cyanosis	  Neurologic: Non-focal  Extremities: 2+ LE edema  Psychiatry: Mood & affect appropriate    LVAD Interrogation: HM2  Pump Flow: 4.8  Pump Speed: 9200  Pulse Index: 4.5  Pump Power: 5.4  VAD Events: None for 24hrs  Driveline evaluation: soft, non-tender, no drainage  Programming Changes: No changes made    Labs:                        7.3    20.6  )-----------( 282      ( 2017 03:03 )             23.7         136  |  101  |  52<H>  ----------------------------<  105<H>  4.2   |  21<L>  |  1.82<H>    Ca    8.1<L>      2017 03:03    TPro  5.4<L>  /  Alb  2.3<L>  /  TBili  2.5<H>  /  DBili  x   /  AST  71<H>  /  ALT  43  /  AlkPhos  135<H>      PT/INR - ( 2017 03:03 )   PT: 19.2 sec;   INR: 1.74 ratio         PTT - ( 2017 03:03 )  PTT:52.5 sec    Lactate Dehydrogenase, Serum: 419 U/L ( @ 02:01)  Lactate Dehydrogenase, Serum: 473 U/L ( @ 01:26)      TELEMETRY: tachycardic, appears to be atrial tach

## 2017-06-23 NOTE — PROGRESS NOTE ADULT - ATTENDING COMMENTS
NORMAN on CKD post LVAD: Likely in the setting of cardiogenic shock   Renal function is stable  Monitor renal function closely as he has had urinary retention in the past and his diaz has been removed.   Continue with Diuresis as he is still volume overloaded on exam   Proteinuria of 1.7 gms noted. Recommend check SPEP/SIFE/Immunoglobulin light chains  Anemia: pRBC per primary team  No evidence of hemolysis     Godwin Lucia MD NORMAN on CKD post LVAD: Likely in the setting of cardiogenic shock   Renal function improving slowly  Currently with no e/o obstruction with excellent urine output despite diaz removal    Continue with Diuresis per heart failure team/CTICU  as he is still volume overloaded on exam   Proteinuria of 1.7 gms noted. Recommend check SPEP/SIFE/Immunoglobulin light chains  Anemia: pRBC per primary team  No evidence of hemolysis     Godwin Lucia MD

## 2017-06-23 NOTE — PROGRESS NOTE ADULT - PROBLEM SELECTOR PLAN 1
hemodynamically mediated in setting of decompensated heart failure +/- urinary retention component and hypotension.  Pt with improving  SCr to 1.82   today (from 1.9 yesterday).  Pt with  good urine output.   Monitor BMP  Strict I/O, avoid nephrotoxics, NSAIDs, RCA. Renal dose of medications.

## 2017-06-23 NOTE — CHART NOTE - NSCHARTNOTEFT_GEN_A_CORE
Source: Patient [ X]    Family [ ]     other [ X] RN, PA, Medical record.    Pt seen. Pt with end stage cardiomyopathy cardiogenic shock s/p LVAD and NORMAN on CKD post LVAD with renal function stable.   Pt seen for calorie count f/u: results are as follows  6/20: 446kcals and 28gm protein   6/21: 778kcal and 31gm protein  6/22; 1014kcals and 57gm protein  3 day average: 746kcals and 8gm protein.     Per RN, Pt consumed 100% of breakfast this morning as she identified Pt requires 100% assistance with meals. Per discussed with PA. A new 3day calorie count was initiated and Nepro @ 25ml/zob03noh via NGT is to continue a this time for supplementation.     Diet : Low Na, soft diet with Ensure Pudding x3 daily    Patient reports [ ] nausea  [ ] vomiting [ ] diarrhea [ ] constipation  [ ]chewing problems [ ] swallowing issues  [ X] other: None, last BM was 6/22     PO intake:  < 50% [ ] 50-75% [X ]   % [ ]  other : PO intake is improving each day and with increased meal time assistance      Source for PO intake [X ] Patient [ ] family [ ] chart [ X] staff [ X] other     Enteral /Parenteral Nutrition: Nepro @ 25ml/[id number] via NGT ( 1080kcals and 48gm protein)       Current Weight: 76.8kg, trending down, fluctuations seen   % Weight Change    Pertinent Medications: MEDICATIONS  (STANDING):  sodium chloride 0.9%. 1000milliLiter(s) IV Continuous <Continuous>  docusate sodium 100milliGRAM(s) Oral three times a day  dextrose 5%. 1000milliLiter(s) IV Continuous <Continuous>  dextrose 50% Injectable 25Gram(s) IV Push once  milrinone Infusion 0.25MICROgram(s)/kG/Min IV Continuous <Continuous>  aspirin  chewable 81milliGRAM(s) Oral daily  amiodarone    Tablet 200milliGRAM(s) Oral daily  insulin lispro (HumaLOG) corrective regimen sliding scale  SubCutaneous three times a day before meals  insulin lispro (HumaLOG) corrective regimen sliding scale  SubCutaneous at bedtime  heparin  Infusion 1000Unit(s)/Hr IV Continuous <Continuous>  famotidine    Tablet 20milliGRAM(s) Oral daily  QUEtiapine 25milliGRAM(s) Oral two times a day  furosemide   Injectable 40milliGRAM(s) IV Push three times a day    MEDICATIONS  (PRN):    Pertinent Labs:    Hgb/Hct:7.3/2.7-low, Na:136, K:4.2, Cl:101, BUN:52-high, Cr:1.82-high, Glucose:105-high, Ca:8.1-low, Total Protein:5.4-low, Albumin:2.3-low, Total Bilirubin:2.5-high,  AlkPhos:135-high, AST:71-high, ALT:43, Bg levles; 6/22: 117-137, 6/23: 150    Skin: Intact, No edema    Estimated Needs:   [ X] no change since previous assessment  [ ] recalculated:       Previous Nutrition Diagnosis:     [ ] Inadequate Energy Intake [ ]Inadequate Oral Intake [ ] Excessive Energy Intake     [ ] Underweight [ ] Increased Nutrient Needs [ ] Overweight/Obesity     [ ] Altered GI Function [ ] Unintended Weight Loss [X ] Food & Nutrition Related Knowledge Deficit [X ] Malnutrition          Nutrition Diagnosis is [ X] ongoing  [ ] resolved [ ] not applicable          New Nutrition Diagnosis: [X ] not applicable          Interventions:     Recommend    [ ] Change Diet To: Continue current Low NA, soft,    [ X] Nutrition Supplement: Continue enusre pudding x3 daily     [X ] Nutrition Support: continue: Nutrition: Nepro @ 25ml/[id number] via NGT ( 1080kcals and 48gm protein)       [X ] Other: 1. Continue 3 day calorie count, due 6/26. 2. Provide total feeding assistance at all meals. 3. Provide food preferences as requested by Pt/family within diet restrictions  4. Encourage PO intake during meal times 5. Reviewed menu ordering procedures 6. follow up with education as amenable. 7. Trend BG levels, LFT's renal indices and electrolytes        Monitoring and Evaluation:     [ X] PO intake [X ] Tolerance to diet prescription [X ] weights [ X] follow up per protocol    [ X] other: RD remains available Sarah Siegler RD. Pager #[phone]

## 2017-06-23 NOTE — PROGRESS NOTE ADULT - PROBLEM SELECTOR PLAN 3
No programming changes.  Continue aspirin 81 mg daily.  On heparin gtt with aPTT 60s (goal 50-70)  Coumadin dosing tonight with 4 mg, goal INR 2-2.5  LDH improving

## 2017-06-23 NOTE — PROGRESS NOTE ADULT - SUBJECTIVE AND OBJECTIVE BOX
CRITICAL CARE ATTENDING - CTICU    MEDICATIONS  (STANDING):  sodium chloride 0.9%. 1000milliLiter(s) IV Continuous <Continuous>  docusate sodium 100milliGRAM(s) Oral three times a day  dextrose 5%. 1000milliLiter(s) IV Continuous <Continuous>  dextrose 50% Injectable 25Gram(s) IV Push once  milrinone Infusion 0.25MICROgram(s)/kG/Min IV Continuous <Continuous>  aspirin  chewable 81milliGRAM(s) Oral daily  amiodarone    Tablet 200milliGRAM(s) Oral daily  insulin lispro (HumaLOG) corrective regimen sliding scale  SubCutaneous three times a day before meals  insulin lispro (HumaLOG) corrective regimen sliding scale  SubCutaneous at bedtime  heparin  Infusion 1000Unit(s)/Hr IV Continuous <Continuous>  famotidine    Tablet 20milliGRAM(s) Oral daily  QUEtiapine 25milliGRAM(s) Oral two times a day  furosemide   Injectable 40milliGRAM(s) IV Push three times a day                                    7.3    20.6  )-----------( 282      ( 2017 03:03 )             23.7           136  |  101  |  52<H>  ----------------------------<  105<H>  4.2   |  21<L>  |  1.82<H>    Ca    8.1<L>      2017 03:03    TPro  5.4<L>  /  Alb  2.3<L>  /  TBili  2.5<H>  /  DBili  x   /  AST  71<H>  /  ALT  43  /  AlkPhos  135<H>        PT/INR - ( 2017 03:03 )   PT: 19.2 sec;   INR: 1.74 ratio         PTT - ( 2017 03:03 )  PTT:52.5 sec        Daily     Daily Weight in k.4 (2017 03:00)    I & Os for 24h ending  @ 07:00  =============================================  IN: 1624.4 ml / OUT: 2800 ml / NET: -1175.6 ml    I & Os for current day (as of  @ 10:55)  =============================================  IN: 630.4 ml / OUT: 275 ml / NET: 355.4 ml      Critically Ill patient  : [ ] preoperative ,   [x ] post operative    Requires :  [x ] Arterial Line   [ ] Central Line  [ ] PA catheter  [ ] IABP  [ ] ECMO  [x ] LVAD  [ ] Ventilator  [x ] pacemaker [ ] Impella.    Bedside evaluation , monitoring , treatment of hemodynamics , fluids , IVP/ IVCD meds.        Diagnosis:     POD 6/12 :  LVAD implant    Cardiogenic Shock  - resolving    CHF- acute [x ]   chronic [x ]    systolic [x ]   diatolic [ ]          - Echo- EF -  10           [ ] RV dysfunction          - Cxr-cardiomegally, edema          - Clinical-  [x ]inotropes   [ ]pressors   [x ]diuresis   [ ]IABP   [ ]ECMO   [x ]LVAD   [ ]Respiratory Failure    Requires chest PT, pulmonary toilet, ambu bagging, suctioning to maintain SaO2,  patent airway and treat atelectasis.    Requires bedside physical therapy, mobilization and total skilled nursing care.     IVCD anticoagulation with [x] Heparin  [ ] Argatroban for A Fib, LVAD    Hemodynamic lability,instability. Requires IVCD [ ] vasopressors [x ] inotropes  [ ] vasodilator  [ ]IVSS fluid  to maintain MAP, perfusion, C.I.     Fevers s/p cultures of blood, urine,sputum. Will change lines today.    fluid overload     Renal Failure     calorie count    Hypotension a/w Hypertension, requiring intravenous medication.               -                 Discussed with CT surgeon, Physician's Assistant - Nurse Practitioner- Critical care medicine team.   Dicussed at  AM / PM rounds.   Chart, labs , films reviewed.    Total Time: 120 min.

## 2017-06-23 NOTE — PROGRESS NOTE ADULT - SUBJECTIVE AND OBJECTIVE BOX
[facility] DIVISION OF KIDNEY DISEASES AND HYPERTENSION -- [phone]   FOLLOW UP NOTE  --------------------------------------------------------------------------------  Chief Complaint: Heart failure      24hour events/Subjective: Pt seen and examined. Pt sitting up in chair, eating breakfast. States he feels good today.         PAST HISTORY  --------------------------------------------------------------------------------  No significant changes to PMH, PSH, FHx, SHx, unless otherwise noted    ALLERGIES & MEDICATIONS  --------------------------------------------------------------------------------  Allergies    No Known Allergies    Intolerances      Standing Inpatient Medications  sodium chloride 0.9%. 1000milliLiter(s) IV Continuous <Continuous>  docusate sodium 100milliGRAM(s) Oral three times a day  dextrose 5%. 1000milliLiter(s) IV Continuous <Continuous>  dextrose 50% Injectable 25Gram(s) IV Push once  milrinone Infusion 0.25MICROgram(s)/kG/Min IV Continuous <Continuous>  aspirin  chewable 81milliGRAM(s) Oral daily  amiodarone    Tablet 200milliGRAM(s) Oral daily  insulin lispro (HumaLOG) corrective regimen sliding scale  SubCutaneous three times a day before meals  insulin lispro (HumaLOG) corrective regimen sliding scale  SubCutaneous at bedtime  heparin  Infusion 1000Unit(s)/Hr IV Continuous <Continuous>  famotidine    Tablet 20milliGRAM(s) Oral daily  QUEtiapine 25milliGRAM(s) Oral two times a day  potassium chloride  10 mEq/50 mL IVPB 10milliEquivalent(s) IV Intermittent once  furosemide   Injectable 40milliGRAM(s) IV Push three times a day    PRN Inpatient Medications      REVIEW OF SYSTEMS  --------------------------------------------------------------------------------  As above         VITALS/PHYSICAL EXAM  --------------------------------------------------------------------------------  T(C): 36.2, Max: 36.5 (06-22 @ 12:00)  HR: 126 (107 - 126)  BP: --  RR: 24 (12 - 25)  SpO2: 96% (93% - 100%)  Wt(kg): --        I & Os for current day (as of 06-23-17 @ 08:45)  =============================================  IN: 1599.4 ml / OUT: 2800 ml / NET: -1200.6 ml    Physical Exam:  	Gen: NAD, sitting up in chair   	HEENT: MMM  	Pulm: CTA B/L  	CV: S1S2  	Abd: Soft, +BS  	Ext: + LE edema B/L                      Neuro: Awake   	Skin: Warm and Dry   	Other ; no emily    LABS/STUDIES  --------------------------------------------------------------------------------              7.3    20.6  >-----------<  282      [06-23-17 @ 03:03]              23.7     136  |  101  |  52  ----------------------------<  105      [06-23-17 @ 03:03]  4.2   |  21  |  1.82        Ca     8.1     [06-23-17 @ 03:03]    TPro  5.4  /  Alb  2.3  /  TBili  2.5  /  DBili  x   /  AST  71  /  ALT  43  /  AlkPhos  135  [06-23-17 @ 03:03]    PT/INR: PT 19.2 , INR 1.74       [06-23-17 @ 03:03]  PTT: 52.5       [06-23-17 @ 03:03]          [06-22-17 @ 02:01]    Creatinine Trend:  SCr 1.82 [06-23 @ 03:03]  SCr 1.94 [06-22 @ 02:01]  SCr 2.06 [06-21 @ 01:26]  SCr 1.69 [06-20 @ 01:15]  SCr 2.39 [06-19 @ 00:36]    Urinalysis - [06-21-17 @ 18:17]      Color Yellow / Appearance SL Turbid / SG 1.010 / pH 7.0      Gluc Negative / Ketone Negative  / Bili Negative / Urobili 4       Blood Small / Protein 30 / Leuk Est Moderate / Nitrite Negative      RBC 3-5 / WBC 6-10 / Hyaline  / Gran  / Sq Epi  / Non Sq Epi OCC / Bacteria Few    Urine Creatinine 32      [06-21-17 @ 22:07]  Urine Protein 53      [06-21-17 @ 22:07]  Urine Sodium 42      [06-21-17 @ 18:17]  Urine Chloride 46      [06-21-17 @ 18:17]    Iron 20, TIBC 352, %sat 6      [06-08-17 @ 07:14]  Ferritin 121.0      [06-09-17 @ 19:08]  HbA1c 5.5      [06-07-17 @ 06:14]  TSH 2.33      [06-14-17 @ 14:51]  Lipid: chol 62, TG 33, HDL 17, LDL 38      [06-07-17 @ 06:14]

## 2017-06-23 NOTE — PROGRESS NOTE ADULT - PROBLEM SELECTOR PLAN 1
Lasix 40 mg IV TID to further diuresis. He remains significantly volume overloaded, but improving  MAP goal 70-80

## 2017-06-24 DIAGNOSIS — D72.829 ELEVATED WHITE BLOOD CELL COUNT, UNSPECIFIED: ICD-10-CM

## 2017-06-24 DIAGNOSIS — I47.1 SUPRAVENTRICULAR TACHYCARDIA: ICD-10-CM

## 2017-06-24 DIAGNOSIS — D50.0 IRON DEFICIENCY ANEMIA SECONDARY TO BLOOD LOSS (CHRONIC): ICD-10-CM

## 2017-06-24 LAB
ANION GAP SERPL CALC-SCNC: 10 MMOL/L — SIGNIFICANT CHANGE UP (ref 5–17)
APTT BLD: 58.5 SEC — HIGH (ref 27.5–37.4)
BLD GP AB SCN SERPL QL: NEGATIVE — SIGNIFICANT CHANGE UP
BUN SERPL-MCNC: 50 MG/DL — HIGH (ref 7–23)
CALCIUM SERPL-MCNC: 8.2 MG/DL — LOW (ref 8.4–10.5)
CHLORIDE SERPL-SCNC: 100 MMOL/L — SIGNIFICANT CHANGE UP (ref 96–108)
CO2 SERPL-SCNC: 23 MMOL/L — SIGNIFICANT CHANGE UP (ref 22–31)
CREAT SERPL-MCNC: 1.73 MG/DL — HIGH (ref 0.5–1.3)
CULTURE RESULTS: SIGNIFICANT CHANGE UP
CULTURE RESULTS: SIGNIFICANT CHANGE UP
GAS PNL BLDA: SIGNIFICANT CHANGE UP
GAS PNL BLDA: SIGNIFICANT CHANGE UP
GLUCOSE SERPL-MCNC: 92 MG/DL — SIGNIFICANT CHANGE UP (ref 70–99)
HCT VFR BLD CALC: 22.6 % — LOW (ref 39–50)
HGB BLD-MCNC: 7.2 G/DL — LOW (ref 13–17)
INR BLD: 2.26 RATIO — HIGH (ref 0.88–1.16)
MCHC RBC-ENTMCNC: 29.3 PG — SIGNIFICANT CHANGE UP (ref 27–34)
MCHC RBC-ENTMCNC: 31.9 GM/DL — LOW (ref 32–36)
MCV RBC AUTO: 91.6 FL — SIGNIFICANT CHANGE UP (ref 80–100)
PLATELET # BLD AUTO: 349 K/UL — SIGNIFICANT CHANGE UP (ref 150–400)
POTASSIUM SERPL-MCNC: 3.9 MMOL/L — SIGNIFICANT CHANGE UP (ref 3.5–5.3)
POTASSIUM SERPL-SCNC: 3.9 MMOL/L — SIGNIFICANT CHANGE UP (ref 3.5–5.3)
PROTHROM AB SERPL-ACNC: 25.1 SEC — HIGH (ref 9.8–12.7)
RBC # BLD: 2.47 M/UL — LOW (ref 4.2–5.8)
RBC # FLD: 21.2 % — HIGH (ref 10.3–14.5)
RH IG SCN BLD-IMP: POSITIVE — SIGNIFICANT CHANGE UP
SODIUM SERPL-SCNC: 133 MMOL/L — LOW (ref 135–145)
SPECIMEN SOURCE: SIGNIFICANT CHANGE UP
SPECIMEN SOURCE: SIGNIFICANT CHANGE UP
WBC # BLD: 23.7 K/UL — HIGH (ref 3.8–10.5)
WBC # FLD AUTO: 23.7 K/UL — HIGH (ref 3.8–10.5)

## 2017-06-24 PROCEDURE — 99232 SBSQ HOSP IP/OBS MODERATE 35: CPT | Mod: GC

## 2017-06-24 PROCEDURE — 93750 INTERROGATION VAD IN PERSON: CPT

## 2017-06-24 PROCEDURE — 99291 CRITICAL CARE FIRST HOUR: CPT

## 2017-06-24 PROCEDURE — 71010: CPT | Mod: 26

## 2017-06-24 PROCEDURE — 99233 SBSQ HOSP IP/OBS HIGH 50: CPT | Mod: 25

## 2017-06-24 PROCEDURE — 99292 CRITICAL CARE ADDL 30 MIN: CPT

## 2017-06-24 RX ORDER — FUROSEMIDE 40 MG
10 TABLET ORAL
Qty: 500 | Refills: 0 | Status: DISCONTINUED | OUTPATIENT
Start: 2017-06-24 | End: 2017-06-29

## 2017-06-24 RX ORDER — QUETIAPINE FUMARATE 200 MG/1
50 TABLET, FILM COATED ORAL EVERY 12 HOURS
Qty: 0 | Refills: 0 | Status: DISCONTINUED | OUTPATIENT
Start: 2017-06-24 | End: 2017-06-26

## 2017-06-24 RX ORDER — WARFARIN SODIUM 2.5 MG/1
3 TABLET ORAL ONCE
Qty: 0 | Refills: 0 | Status: COMPLETED | OUTPATIENT
Start: 2017-06-24 | End: 2017-06-24

## 2017-06-24 RX ORDER — HYDRALAZINE HCL 50 MG
10 TABLET ORAL ONCE
Qty: 0 | Refills: 0 | Status: COMPLETED | OUTPATIENT
Start: 2017-06-24 | End: 2017-06-24

## 2017-06-24 RX ORDER — POTASSIUM CHLORIDE 20 MEQ
10 PACKET (EA) ORAL ONCE
Qty: 0 | Refills: 0 | Status: DISCONTINUED | OUTPATIENT
Start: 2017-06-24 | End: 2017-06-24

## 2017-06-24 RX ORDER — POTASSIUM CHLORIDE 20 MEQ
20 PACKET (EA) ORAL ONCE
Qty: 0 | Refills: 0 | Status: COMPLETED | OUTPATIENT
Start: 2017-06-24 | End: 2017-06-24

## 2017-06-24 RX ORDER — FUROSEMIDE 40 MG
80 TABLET ORAL ONCE
Qty: 0 | Refills: 0 | Status: COMPLETED | OUTPATIENT
Start: 2017-06-24 | End: 2017-06-24

## 2017-06-24 RX ORDER — HYDRALAZINE HCL 50 MG
25 TABLET ORAL EVERY 8 HOURS
Qty: 0 | Refills: 0 | Status: DISCONTINUED | OUTPATIENT
Start: 2017-06-24 | End: 2017-06-26

## 2017-06-24 RX ADMIN — Medication 100 MILLIGRAM(S): at 22:01

## 2017-06-24 RX ADMIN — Medication 81 MILLIGRAM(S): at 14:05

## 2017-06-24 RX ADMIN — Medication 25 MILLIGRAM(S): at 16:36

## 2017-06-24 RX ADMIN — HEPARIN SODIUM 10 UNIT(S)/HR: 5000 INJECTION INTRAVENOUS; SUBCUTANEOUS at 00:28

## 2017-06-24 RX ADMIN — Medication 100 MILLIGRAM(S): at 13:24

## 2017-06-24 RX ADMIN — QUETIAPINE FUMARATE 50 MILLIGRAM(S): 200 TABLET, FILM COATED ORAL at 16:35

## 2017-06-24 RX ADMIN — AMIODARONE HYDROCHLORIDE 200 MILLIGRAM(S): 400 TABLET ORAL at 05:12

## 2017-06-24 RX ADMIN — Medication 20 MILLIEQUIVALENT(S): at 13:24

## 2017-06-24 RX ADMIN — WARFARIN SODIUM 3 MILLIGRAM(S): 2.5 TABLET ORAL at 22:01

## 2017-06-24 RX ADMIN — Medication 2: at 09:23

## 2017-06-24 RX ADMIN — Medication 40 MILLIGRAM(S): at 05:12

## 2017-06-24 RX ADMIN — Medication 80 MILLIGRAM(S): at 16:35

## 2017-06-24 RX ADMIN — QUETIAPINE FUMARATE 25 MILLIGRAM(S): 200 TABLET, FILM COATED ORAL at 05:12

## 2017-06-24 RX ADMIN — MILRINONE LACTATE 5.62 MICROGRAM(S)/KG/MIN: 1 INJECTION, SOLUTION INTRAVENOUS at 04:50

## 2017-06-24 RX ADMIN — Medication 25 MILLIGRAM(S): at 22:01

## 2017-06-24 RX ADMIN — FAMOTIDINE 20 MILLIGRAM(S): 10 INJECTION INTRAVENOUS at 13:24

## 2017-06-24 RX ADMIN — MILRINONE LACTATE 5.62 MICROGRAM(S)/KG/MIN: 1 INJECTION, SOLUTION INTRAVENOUS at 00:28

## 2017-06-24 RX ADMIN — Medication 100 MILLIGRAM(S): at 05:12

## 2017-06-24 RX ADMIN — Medication 20 MILLIEQUIVALENT(S): at 04:47

## 2017-06-24 RX ADMIN — HEPARIN SODIUM 10 UNIT(S)/HR: 5000 INJECTION INTRAVENOUS; SUBCUTANEOUS at 04:49

## 2017-06-24 RX ADMIN — Medication 10 MILLIGRAM(S): at 16:35

## 2017-06-24 NOTE — PROGRESS NOTE ADULT - PROBLEM SELECTOR PLAN 3
Continues to be volume overloaded, try to concentrate all drips, now that milrinone off may achieve net negative diuresis, about net even over last 24 hrs on lasix TID 40mg IV, would continue

## 2017-06-24 NOTE — PROGRESS NOTE ADULT - SUBJECTIVE AND OBJECTIVE BOX
BILLY RUSSELL  MRN#:  [id number]    The patient is a 67y Male who was seen, evaluated, & examined with the CTICU staff on rounds and later in the day with a multidisciplinary care plan formulated & implemented.  All available clinical, laboratory, radiographic, pharmacologic, and electrocardiographic data were reviewed & analyzed.      The patient was in the CTICU in critical condition secondary to persistent cardiopulmonary dysfunction, resolving non-oliguric renal injury, resolving anasarca, acute postoperative blood loss anemia, and endstage cardiomyopathy-cardiogenic shock-S/P LVAD.      Respiratory status required supplemental oxygen, the following of ABG’s with A-line monitoring, continuous pulse oximetry monitoring, & an IV Lasix drip for support & to evaluate for & prevent further decompensation secondary to persistent cardiopulmonary dysfunction, resolving non-oliguric renal injury, and persistent resolving anasarca .     Invasive hemodynamic monitoring with an A-line was required for the following of continuous MAP/BP monitoring to ensure adequate cardiovascular support and to evaluate for & help prevent decompensation while receiving a blood transfusion, an IV Lasix drip, an IV Heparin drip, Coumadin loading, and cessation of the IV Primacor drip secondary to resolving non-oliguric renal injury, resolving anasarca, blood loss anemia, and endstage cardiomyopathy-cardiogenic shock-S/P LVAD.    Patient required more than the usual postoperative critical care management and I provided 80 minutes of non-continuous care to the patient.  Discussed at length with the CTICU staff and helped coordinate care. BILLY RUSSELL  MRN#:  [id number]    The patient is a 67y Male who was seen, evaluated, & examined with the CTICU staff on rounds and later in the day with a multidisciplinary care plan formulated & implemented.  All available clinical, laboratory, radiographic, pharmacologic, and electrocardiographic data were reviewed & analyzed.      The patient was in the CTICU in critical condition secondary to persistent cardiopulmonary dysfunction, resolving non-oliguric renal injury, resolving anasarca, acute postoperative blood loss anemia, and endstage cardiomyopathy-cardiogenic shock-S/P LVAD.      Respiratory status required supplemental oxygen, the following of ABG’s with A-line monitoring, continuous pulse oximetry monitoring, and an increased IV Lasix drip for support & to evaluate for & prevent further decompensation secondary to persistent cardiopulmonary dysfunction, resolving non-oliguric renal injury, and persistent resolving anasarca .     Invasive hemodynamic monitoring with an A-line was required for the following of continuous MAP/BP monitoring to ensure adequate cardiovascular support and to evaluate for & help prevent decompensation while receiving a blood transfusion, an increase in the IV Lasix drip, an IV Heparin drip, Coumadin loading, cessation of the IV Primacor drip, an IV dose of Hydralazine, and enteral Hydralazine secondary to resolving non-oliguric renal injury, resolving anasarca, blood loss anemia, and endstage cardiomyopathy-cardiogenic shock-S/P LVAD.    Patient required more than the usual postoperative critical care management and I provided 80 minutes of non-continuous care to the patient.  Discussed at length with the CTICU staff and helped coordinate care.

## 2017-06-24 NOTE — PROGRESS NOTE ADULT - ASSESSMENT
67 year old man with ACC/AHA stage D congestive heart failure with severely reduced LV systolic function due to a nonischemic dilated cardiomyopathy s/p HM2 LVAD on 6/12 with post-operative course complicated by hemodynamically but self-terminating ventricular tachycardia and acute renal failure, which is improving. He continues to improve functionally on reduced dose milrinone but is having persistent SVT, likely atrial tachycardia. His hematocrit is slowly decreasing and he has persistent leukocytosis without joshua evidence of infection. His right hand is swollen and tender.

## 2017-06-24 NOTE — PROGRESS NOTE ADULT - SUBJECTIVE AND OBJECTIVE BOX
He feels well without shortness of breath. He ate all of his breakfast. He has swelling of his right hand on the fist and second digits. He has no fevers and no significant cough.         Medications:  sodium chloride 0.9%. 1000milliLiter(s) IV Continuous <Continuous>  docusate sodium 100milliGRAM(s) Oral three times a day  milrinone Infusion 0.25MICROgram(s)/kG/Min IV Continuous <Continuous>  aspirin  chewable 81milliGRAM(s) Oral daily  amiodarone    Tablet 200milliGRAM(s) Oral daily  insulin lispro (HumaLOG) corrective regimen sliding scale  SubCutaneous three times a day before meals  insulin lispro (HumaLOG) corrective regimen sliding scale  SubCutaneous at bedtime  heparin  Infusion 1000Unit(s)/Hr IV Continuous <Continuous>  famotidine    Tablet 20milliGRAM(s) Oral daily  QUEtiapine 25milliGRAM(s) Oral two times a day  furosemide   Injectable 40milliGRAM(s) IV Push three times a day      Vitals:  T(C): 37.2, Max: 37.4 (- @ 04:00)  HR: 132 (114 - 135)  ABP: 81/75 (64/59 - 109/95)  ABP(mean): 79 (62 - 102)  RR: 21 (12 - 25)  SpO2: 100% (94% - 100%)       Daily Weight in k (2017 00:00)    I&O's Summary  I & Os for 24h ending 2017 07:00  =============================================  IN: 2521.4 ml / OUT: 2300 ml / NET: 221.4 ml    I & Os for current day (as of 2017 08:58)  =============================================  IN: 375.6 ml / OUT: 200 ml / NET: 175.6 ml    I&O's Detail  I & Os for 24h ending 2017 07:00  =============================================  IN:    Oral Fluid: 1486 ml    Nepro with Carb Steady: 475 ml    heparin Infusion: 240 ml    Enteral Tube Flush: 186 ml    milrinone  Infusion: 134.4 ml    Total IN: 2521.4 ml  ---------------------------------------------  OUT:    Voided: 2300 ml    Total OUT: 2300 ml  ---------------------------------------------  Total NET: 221.4 ml    I & Os for current day (as of 2017 08:58)  =============================================  IN:    Oral Fluid: 350 ml    heparin Infusion: 10 ml    sodium chloride 0.9%.: 10 ml    milrinone  Infusion: 5.6 ml    Total IN: 375.6 ml  ---------------------------------------------  OUT:    Voided: 200 ml    Total OUT: 200 ml  ---------------------------------------------  Total NET: 175.6 ml      Physical Exam:     General: No distress. Comfortable.  HEENT: EOM intact.  Neck: Neck supple. JVP mildly elevated. No masses  Chest: Clear to auscultation bilaterally  CV: Typical LVAD sounds. No distal pulses  Abdomen: Soft, non-distended, non-tender  Driveline exit site: Clean, dry, and intact  Skin: No rashes or skin breakdown  Neurology: Alert and oriented times three. Sensation intact  Psych: Affect normal    LVAD Interrogation: Heart Mate II  RPMs: 9200  Power: 5.9  Flow: 5.9  PI: 4.9  Event: None  No programming changes were made    Labs:                        7.2    23.7  )-----------( 349      ( 2017 00:27 )             22.6         133<L>  |  100  |  50<H>  ----------------------------<  92  3.9   |  23  |  1.73<H>    Ca    8.2<L>      2017 00:27    TPro  5.4<L>  /  Alb  2.3<L>  /  TBili  2.5<H>  /  DBili  x   /  AST  71<H>  /  ALT  43  /  AlkPhos  135<H>    PT/INR - ( 2017 16:49 )   PT: 20.6 sec;   INR: 1.88 ratio         PTT - ( 2017 00:27 )  PTT:58.5 sec    Lactate Dehydrogenase, Serum: 419 U/L ( @ 02:01)

## 2017-06-24 NOTE — PROGRESS NOTE ADULT - SUBJECTIVE AND OBJECTIVE BOX
[facility] DIVISION OF KIDNEY DISEASES AND HYPERTENSION -- FOLLOW UP NOTE  --------------------------------------------------------------------------------      24 hour events/subjective:        PAST HISTORY  --------------------------------------------------------------------------------  No significant changes to PMH, PSH, FHx, SHx, unless otherwise noted    ALLERGIES & MEDICATIONS  --------------------------------------------------------------------------------  Allergies    No Known Allergies    Intolerances      Standing Inpatient Medications  sodium chloride 0.9%. 1000milliLiter(s) IV Continuous <Continuous>  docusate sodium 100milliGRAM(s) Oral three times a day  dextrose 5%. 1000milliLiter(s) IV Continuous <Continuous>  dextrose 50% Injectable 25Gram(s) IV Push once  milrinone Infusion 0.25MICROgram(s)/kG/Min IV Continuous <Continuous>  aspirin  chewable 81milliGRAM(s) Oral daily  amiodarone    Tablet 200milliGRAM(s) Oral daily  insulin lispro (HumaLOG) corrective regimen sliding scale  SubCutaneous three times a day before meals  insulin lispro (HumaLOG) corrective regimen sliding scale  SubCutaneous at bedtime  heparin  Infusion 1000Unit(s)/Hr IV Continuous <Continuous>  famotidine    Tablet 20milliGRAM(s) Oral daily  QUEtiapine 25milliGRAM(s) Oral two times a day  furosemide   Injectable 40milliGRAM(s) IV Push three times a day    PRN Inpatient Medications      REVIEW OF SYSTEMS  --------------------------------------------------------------------------------  Gen: No weight changes, fatigue, fevers/chills, weakness  Skin: No rashes  Head/Eyes/Ears/Mouth: No headache; Normal hearing; Normal vision w/o blurriness; No sinus pain/discomfort, sore throat  Respiratory: No dyspnea, cough, wheezing, hemoptysis  CV: No chest pain, PND, orthopnea  GI: No abdominal pain, diarrhea, constipation, nausea, vomiting, melena, hematochezia  : No increased frequency, dysuria, hematuria, nocturia  MSK: No joint pain/swelling; no back pain; no edema  Neuro: No dizziness/lightheadedness, weakness, seizures, numbness, tingling  Heme: No easy bruising or bleeding  Endo: No heat/cold intolerance  Psych: No significant nervousness, anxiety, stress, depression    All other systems were reviewed and are negative, except as noted.    VITALS/PHYSICAL EXAM  --------------------------------------------------------------------------------  T(C): 37.2, Max: 37.4 (06-24 @ 04:00)  HR: 139 (114 - 139)  BP: --  RR: 22 (12 - 25)  SpO2: 100% (94% - 100%)  Wt(kg): --      I & Os for 24h ending 06-24-17 @ 07:00  =============================================  IN: 2521.4 ml / OUT: 2300 ml / NET: 221.4 ml    I & Os for current day (as of 06-24-17 @ 10:00)  =============================================  IN: 421.2 ml / OUT: 300 ml / NET: 121.2 ml    Physical Exam:  	Gen: NAD, well-appearing  	HEENT: PERRL, supple neck, clear oropharynx  	Pulm: CTA B/L  	CV: RRR, S1S2; no rub  	Back: No spinal or CVA tenderness; no sacral edema  	Abd: +BS, soft, nontender/nondistended  	: No suprapubic tenderness  	UE: Warm, FROM, no clubbing, intact strength; no edema; no asterixis  	LE: Warm, FROM, no clubbing, intact strength; no edema  	Neuro: No focal deficits, intact gait  	Psych: Normal affect and mood  	Skin: Warm, without rashes  	Vascular access:    LABS/STUDIES  --------------------------------------------------------------------------------              7.2    23.7  >-----------<  349      [06-24-17 @ 00:27]              22.6     133  |  100  |  50  ----------------------------<  92      [06-24-17 @ 00:27]  3.9   |  23  |  1.73        Ca     8.2     [06-24-17 @ 00:27]    TPro  5.4  /  Alb  2.3  /  TBili  2.5  /  DBili  x   /  AST  71  /  ALT  43  /  AlkPhos  135  [06-23-17 @ 03:03]    PT/INR: PT 20.6 , INR 1.88       [06-23-17 @ 16:49]  PTT: 58.5       [06-24-17 @ 00:27]      Creatinine Trend:  SCr 1.73 [06-24 @ 00:27]  SCr 1.82 [06-23 @ 03:03]  SCr 1.94 [06-22 @ 02:01]  SCr 2.06 [06-21 @ 01:26]  SCr 1.69 [06-20 @ 01:15]    Urinalysis - [06-21-17 @ 18:17]      Color Yellow / Appearance SL Turbid / SG 1.010 / pH 7.0      Gluc Negative / Ketone Negative  / Bili Negative / Urobili 4       Blood Small / Protein 30 / Leuk Est Moderate / Nitrite Negative      RBC 3-5 / WBC 6-10 / Hyaline  / Gran  / Sq Epi  / Non Sq Epi OCC / Bacteria Few    Urine Creatinine 32      [06-21-17 @ 22:07]  Urine Protein 53      [06-21-17 @ 22:07]  Urine Sodium 42      [06-21-17 @ 18:17]  Urine Chloride 46      [06-21-17 @ 18:17]    Iron 20, TIBC 352, %sat 6      [06-08-17 @ 07:14]  Ferritin 121.0      [06-09-17 @ 19:08]  HbA1c 5.5      [06-07-17 @ 06:14]  TSH 2.33      [06-14-17 @ 14:51]  Lipid: chol 62, TG 33, HDL 17, LDL 38      [06-07-17 @ 06:14] [facility] DIVISION OF KIDNEY DISEASES AND HYPERTENSION -- FOLLOW UP NOTE  --------------------------------------------------------------------------------      24 hour events/subjective:  Pt feeling well, no pain, no SOB, no trouble urinating.   Milrinone is off  He says his LE swelling is improving        PAST HISTORY  --------------------------------------------------------------------------------  No significant changes to PMH, PSH, FHx, SHx, unless otherwise noted    ALLERGIES & MEDICATIONS  --------------------------------------------------------------------------------  Allergies    No Known Allergies    Intolerances      Standing Inpatient Medications  sodium chloride 0.9%. 1000milliLiter(s) IV Continuous <Continuous>  docusate sodium 100milliGRAM(s) Oral three times a day  dextrose 5%. 1000milliLiter(s) IV Continuous <Continuous>  dextrose 50% Injectable 25Gram(s) IV Push once  milrinone Infusion 0.25MICROgram(s)/kG/Min IV Continuous <Continuous>  aspirin  chewable 81milliGRAM(s) Oral daily  amiodarone    Tablet 200milliGRAM(s) Oral daily  insulin lispro (HumaLOG) corrective regimen sliding scale  SubCutaneous three times a day before meals  insulin lispro (HumaLOG) corrective regimen sliding scale  SubCutaneous at bedtime  heparin  Infusion 1000Unit(s)/Hr IV Continuous <Continuous>  famotidine    Tablet 20milliGRAM(s) Oral daily  QUEtiapine 25milliGRAM(s) Oral two times a day  furosemide   Injectable 40milliGRAM(s) IV Push three times a day    PRN Inpatient Medications      REVIEW OF SYSTEMS  --------------------------------------------------------------------------------  Gen: No weight changes, fatigue, fevers/chills, weakness  Skin: No rashes  Head/Eyes/Ears/Mouth: No headache; Normal hearing; Normal vision w/o blurriness; No sinus pain/discomfort, sore throat  Respiratory: No dyspnea, cough, wheezing, hemoptysis  CV: No chest pain, PND, orthopnea  GI: No abdominal pain, diarrhea, constipation, nausea, vomiting, melena, hematochezia  : No increased frequency, dysuria, hematuria, nocturia  MSK: No joint pain/swelling; no back pain; no edema  Neuro: No dizziness/lightheadedness, weakness, seizures, numbness, tingling  Heme: No easy bruising or bleeding  Endo: No heat/cold intolerance  Psych: No significant nervousness, anxiety, stress, depression    All other systems were reviewed and are negative, except as noted.    VITALS/PHYSICAL EXAM  --------------------------------------------------------------------------------  T(C): 37.2, Max: 37.4 (06-24 @ 04:00)  HR: 139 (114 - 139)  BP: --  RR: 22 (12 - 25)  SpO2: 100% (94% - 100%)  Wt(kg): --      I & Os for 24h ending 06-24-17 @ 07:00  =============================================  IN: 2521.4 ml / OUT: 2300 ml / NET: 221.4 ml    I & Os for current day (as of 06-24-17 @ 10:00)  =============================================  IN: 421.2 ml / OUT: 300 ml / NET: 121.2 ml    Physical Exam:  	Gen: NAD, well-appearing  	HEENT: NGT  	Pulm: trace basilar crackles  	CV: RRR, S1S2; no rub  	Back: No spinal or CVA tenderness; no sacral edema  	Abd: +BS, soft, nontender/nondistended  	: No suprapubic tenderness  	UE: Warm, FROM, no clubbing, intact strength; no edema; no asterixis  	LE: 2+ edema b/l  	Neuro: No focal deficits, intact gait  	Psych: Normal affect and mood  	Skin: Warm, without rashes      LABS/STUDIES  --------------------------------------------------------------------------------              7.2    23.7  >-----------<  349      [06-24-17 @ 00:27]              22.6     133  |  100  |  50  ----------------------------<  92      [06-24-17 @ 00:27]  3.9   |  23  |  1.73        Ca     8.2     [06-24-17 @ 00:27]    TPro  5.4  /  Alb  2.3  /  TBili  2.5  /  DBili  x   /  AST  71  /  ALT  43  /  AlkPhos  135  [06-23-17 @ 03:03]    PT/INR: PT 20.6 , INR 1.88       [06-23-17 @ 16:49]  PTT: 58.5       [06-24-17 @ 00:27]      Creatinine Trend:  SCr 1.73 [06-24 @ 00:27]  SCr 1.82 [06-23 @ 03:03]  SCr 1.94 [06-22 @ 02:01]  SCr 2.06 [06-21 @ 01:26]  SCr 1.69 [06-20 @ 01:15]    Urinalysis - [06-21-17 @ 18:17]      Color Yellow / Appearance SL Turbid / SG 1.010 / pH 7.0      Gluc Negative / Ketone Negative  / Bili Negative / Urobili 4       Blood Small / Protein 30 / Leuk Est Moderate / Nitrite Negative      RBC 3-5 / WBC 6-10 / Hyaline  / Gran  / Sq Epi  / Non Sq Epi OCC / Bacteria Few    Urine Creatinine 32      [06-21-17 @ 22:07]  Urine Protein 53      [06-21-17 @ 22:07]  Urine Sodium 42      [06-21-17 @ 18:17]  Urine Chloride 46      [06-21-17 @ 18:17]    Iron 20, TIBC 352, %sat 6      [06-08-17 @ 07:14]  Ferritin 121.0      [06-09-17 @ 19:08]  HbA1c 5.5      [06-07-17 @ 06:14]  TSH 2.33      [06-14-17 @ 14:51]  Lipid: chol 62, TG 33, HDL 17, LDL 38      [06-07-17 @ 06:14]

## 2017-06-24 NOTE — PROGRESS NOTE ADULT - PROBLEM SELECTOR PLAN 3
No programming changes.  Continue aspirin 81 mg daily.  On heparin gtt with aPTT 60s (goal 50-70). Discontinue when INR > 2.   Coumadin dosing tonight with 3 mg, goal INR 2-2.5  LDH improving

## 2017-06-24 NOTE — PROGRESS NOTE ADULT - ATTENDING COMMENTS
Reviewed and concur with findings, assessment and plan as outlined. Will monitor creatinine, electrolytes and urine output

## 2017-06-24 NOTE — PROGRESS NOTE ADULT - ASSESSMENT
66y/o M with advanced NICM BiV HFrEF 15-20% on a stable dose of milrinone at 3mcg/kg/min via PICC s/p AICD, with replacement from [facility] to [employer] (2009), PAF on AC and h/o VT, with recent admission for AICD firing, with decompensated heart failure s/p LVAD on 6/12 post op with VT and NORMAN w improving renal fx

## 2017-06-24 NOTE — PROGRESS NOTE ADULT - PROBLEM SELECTOR PLAN 1
Continue Lasix 40 mg IV TID.   He remains significantly volume overloaded, but improving  MAP goal 70-80

## 2017-06-24 NOTE — PROGRESS NOTE ADULT - PROBLEM SELECTOR PLAN 2
hemodynamically mediated in setting of decompensated heart failure +/- urinary retention component and hypotension.  Pt with improving  SCr to 1.82   today (from 1.9 yesterday).  Pt with  good urine output.   Monitor BMP  Strict I/O, avoid nephrotoxics, NSAIDs, RCA. Renal dose of medications. hemodynamically mediated in setting of decompensated heart failure +/- urinary retention component and hypotension.  Cr continues to improve, with good urine output.   Monitor BMP  Strict I/O, avoid nephrotoxics, NSAIDs, RCA. Renal dose of medications.

## 2017-06-25 LAB
ALBUMIN SERPL ELPH-MCNC: 2.8 G/DL — LOW (ref 3.3–5)
ALP SERPL-CCNC: 136 U/L — HIGH (ref 40–120)
ALT FLD-CCNC: 46 U/L RC — HIGH (ref 10–45)
ANION GAP SERPL CALC-SCNC: 12 MMOL/L — SIGNIFICANT CHANGE UP (ref 5–17)
APTT BLD: 59.8 SEC — HIGH (ref 27.5–37.4)
AST SERPL-CCNC: 69 U/L — HIGH (ref 10–40)
BILIRUB SERPL-MCNC: 2.4 MG/DL — HIGH (ref 0.2–1.2)
BUN SERPL-MCNC: 47 MG/DL — HIGH (ref 7–23)
CALCIUM SERPL-MCNC: 8.2 MG/DL — LOW (ref 8.4–10.5)
CHLORIDE SERPL-SCNC: 100 MMOL/L — SIGNIFICANT CHANGE UP (ref 96–108)
CO2 SERPL-SCNC: 22 MMOL/L — SIGNIFICANT CHANGE UP (ref 22–31)
CREAT SERPL-MCNC: 1.79 MG/DL — HIGH (ref 0.5–1.3)
GAS PNL BLDA: SIGNIFICANT CHANGE UP
GAS PNL BLDA: SIGNIFICANT CHANGE UP
GLUCOSE SERPL-MCNC: 94 MG/DL — SIGNIFICANT CHANGE UP (ref 70–99)
HAPTOGLOB SERPL-MCNC: 162 MG/DL — SIGNIFICANT CHANGE UP (ref 34–200)
HCT VFR BLD CALC: 24.9 % — LOW (ref 39–50)
HGB BLD-MCNC: 7.7 G/DL — LOW (ref 13–17)
INR BLD: 2.15 RATIO — HIGH (ref 0.88–1.16)
INR BLD: 2.33 RATIO — HIGH (ref 0.88–1.16)
LDH SERPL L TO P-CCNC: 364 U/L — HIGH (ref 50–242)
MCHC RBC-ENTMCNC: 28.3 PG — SIGNIFICANT CHANGE UP (ref 27–34)
MCHC RBC-ENTMCNC: 31 GM/DL — LOW (ref 32–36)
MCV RBC AUTO: 91.4 FL — SIGNIFICANT CHANGE UP (ref 80–100)
PLATELET # BLD AUTO: 424 K/UL — HIGH (ref 150–400)
POTASSIUM SERPL-MCNC: 3.8 MMOL/L — SIGNIFICANT CHANGE UP (ref 3.5–5.3)
POTASSIUM SERPL-SCNC: 3.8 MMOL/L — SIGNIFICANT CHANGE UP (ref 3.5–5.3)
PROT SERPL-MCNC: 6 G/DL — SIGNIFICANT CHANGE UP (ref 6–8.3)
PROTHROM AB SERPL-ACNC: 23.6 SEC — HIGH (ref 9.8–12.7)
PROTHROM AB SERPL-ACNC: 25.8 SEC — HIGH (ref 9.8–12.7)
RBC # BLD: 2.72 M/UL — LOW (ref 4.2–5.8)
RBC # FLD: 20.1 % — HIGH (ref 10.3–14.5)
SODIUM SERPL-SCNC: 134 MMOL/L — LOW (ref 135–145)
WBC # BLD: 21.4 K/UL — HIGH (ref 3.8–10.5)
WBC # FLD AUTO: 21.4 K/UL — HIGH (ref 3.8–10.5)

## 2017-06-25 PROCEDURE — 99233 SBSQ HOSP IP/OBS HIGH 50: CPT | Mod: 25

## 2017-06-25 PROCEDURE — 99291 CRITICAL CARE FIRST HOUR: CPT

## 2017-06-25 PROCEDURE — 93750 INTERROGATION VAD IN PERSON: CPT

## 2017-06-25 PROCEDURE — 71010: CPT | Mod: 26

## 2017-06-25 RX ORDER — WARFARIN SODIUM 2.5 MG/1
2 TABLET ORAL ONCE
Qty: 0 | Refills: 0 | Status: COMPLETED | OUTPATIENT
Start: 2017-06-25 | End: 2017-06-25

## 2017-06-25 RX ORDER — COLCHICINE 0.6 MG
0.6 TABLET ORAL DAILY
Qty: 0 | Refills: 0 | Status: DISCONTINUED | OUTPATIENT
Start: 2017-06-25 | End: 2017-07-17

## 2017-06-25 RX ORDER — POTASSIUM CHLORIDE 20 MEQ
10 PACKET (EA) ORAL ONCE
Qty: 0 | Refills: 0 | Status: COMPLETED | OUTPATIENT
Start: 2017-06-25 | End: 2017-06-25

## 2017-06-25 RX ADMIN — Medication 0.6 MILLIGRAM(S): at 17:15

## 2017-06-25 RX ADMIN — AMIODARONE HYDROCHLORIDE 200 MILLIGRAM(S): 400 TABLET ORAL at 05:54

## 2017-06-25 RX ADMIN — WARFARIN SODIUM 2 MILLIGRAM(S): 2.5 TABLET ORAL at 22:26

## 2017-06-25 RX ADMIN — Medication 5 MG/HR: at 07:00

## 2017-06-25 RX ADMIN — Medication 100 MILLIGRAM(S): at 17:15

## 2017-06-25 RX ADMIN — Medication 10 MILLIEQUIVALENT(S): at 01:51

## 2017-06-25 RX ADMIN — Medication 5 MG/HR: at 22:26

## 2017-06-25 RX ADMIN — FAMOTIDINE 20 MILLIGRAM(S): 10 INJECTION INTRAVENOUS at 12:26

## 2017-06-25 RX ADMIN — QUETIAPINE FUMARATE 50 MILLIGRAM(S): 200 TABLET, FILM COATED ORAL at 17:35

## 2017-06-25 RX ADMIN — QUETIAPINE FUMARATE 50 MILLIGRAM(S): 200 TABLET, FILM COATED ORAL at 05:53

## 2017-06-25 RX ADMIN — Medication 0.6 MILLIGRAM(S): at 11:45

## 2017-06-25 RX ADMIN — HEPARIN SODIUM 10 UNIT(S)/HR: 5000 INJECTION INTRAVENOUS; SUBCUTANEOUS at 03:09

## 2017-06-25 RX ADMIN — Medication 25 MILLIGRAM(S): at 22:26

## 2017-06-25 RX ADMIN — Medication 100 MILLIGRAM(S): at 05:53

## 2017-06-25 RX ADMIN — Medication 25 MILLIGRAM(S): at 17:15

## 2017-06-25 RX ADMIN — Medication 5 MG/HR: at 03:10

## 2017-06-25 RX ADMIN — Medication 81 MILLIGRAM(S): at 12:26

## 2017-06-25 RX ADMIN — Medication 25 MILLIGRAM(S): at 05:54

## 2017-06-25 NOTE — PROGRESS NOTE ADULT - SUBJECTIVE AND OBJECTIVE BOX
BILLY RUSSELL  MRN#:  [id number]    The patient is a 67y Male who was seen, evaluated, & examined with the CTICU staff on rounds and later in the day with a multidisciplinary care plan formulated & implemented.  All available clinical, laboratory, radiographic, pharmacologic, and electrocardiographic data were reviewed & analyzed.      The patient was in the CTICU in critical condition secondary to persistent cardiopulmonary dysfunction, resolving non-oliguric renal injury, resolving anasarca, acute postoperative blood loss anemia, and endstage cardiomyopathy-cardiogenic shock-S/P LVAD.      Respiratory status required supplemental oxygen, the following of ABG’s with A-line monitoring, continuous pulse oximetry monitoring, and an IV Lasix drip for support & to evaluate for & prevent further decompensation secondary to persistent cardiopulmonary dysfunction, resolving non-oliguric renal injury, and persistent resolving anasarca .     Invasive hemodynamic monitoring with an A-line was required for the following of continuous MAP/BP monitoring to ensure adequate cardiovascular support and to evaluate for & help prevent decompensation while receiving a blood transfusion, an IV Lasix drip, cessation of the IV Heparin drip, Coumadin loading, and enteral Hydralazine secondary to resolving non-oliguric renal injury, resolving anasarca, and endstage cardiomyopathy-cardiogenic shock-S/P LVAD.    Patient required more than the usual postoperative critical care management and I provided 30 minutes of non-continuous care to the patient.  Discussed at length with the CTICU staff and helped coordinate care.

## 2017-06-25 NOTE — PROGRESS NOTE ADULT - PROBLEM SELECTOR PLAN 5
Antibiotics were discontinued as cultures have been negative.   Possibly related to right hand swelling.  Continue to monitor

## 2017-06-25 NOTE — PROGRESS NOTE ADULT - PROBLEM SELECTOR PLAN 3
No programming changes.  Continue aspirin 81 mg daily.  Discontinue heparin drip  Coumadin dosing tonight with 3 mg, goal INR 2-2.5  LDH within acceptable range

## 2017-06-25 NOTE — PROGRESS NOTE ADULT - ASSESSMENT
67 year old man with ACC/AHA stage D congestive heart failure with severely reduced LV systolic function due to a nonischemic dilated cardiomyopathy s/p HM2 LVAD on 6/12 with post-operative course complicated by hemodynamically but self-terminating ventricular tachycardia and acute renal failure, which is improving. He continues to improve functionally now off of milrinone, but is having persistent SVT, likely atrial tachycardia. His hematocrit is decreased and he has persistent leukocytosis without joshua evidence of infection. His right hand is swollen and tender.

## 2017-06-25 NOTE — PROGRESS NOTE ADULT - PROBLEM SELECTOR PLAN 6
Continue amiodarone  Will see if improvement off of milrinone  If not resolved by tomorrow, consult EP

## 2017-06-25 NOTE — PROGRESS NOTE ADULT - SUBJECTIVE AND OBJECTIVE BOX
He feels well this morning. Sitting in chair with occasional cough. He notes improvement in lower extremity swelling. He has continued hand swelling and pain, but manageable.         Medications:  sodium chloride 0.9%. 1000milliLiter(s) IV Continuous <Continuous>  docusate sodium 100milliGRAM(s) Oral three times a day  aspirin  chewable 81milliGRAM(s) Oral daily  amiodarone    Tablet 200milliGRAM(s) Oral daily  insulin lispro (HumaLOG) corrective regimen sliding scale  SubCutaneous three times a day before meals  insulin lispro (HumaLOG) corrective regimen sliding scale  SubCutaneous at bedtime  heparin  Infusion 1000Unit(s)/Hr IV Continuous <Continuous>  famotidine    Tablet 20milliGRAM(s) Oral daily  furosemide Infusion 10mG/Hr IV Continuous <Continuous>  QUEtiapine 50milliGRAM(s) Oral every 12 hours  hydrALAZINE 25milliGRAM(s) Oral every 8 hours      Vitals:  T(C): 36.9, Max: 37.2 (06-24 @ 08:00)  HR: 135 (123 - 139)  ABP: 69/64 (69/64 - 106/90)  ABP(mean): 67 (67 - 98)  RR: 20 (14 - 33)  SpO2: 97% (97% - 100%)         Daily Weight in k (2017 00:00)    I&O's Summary    I & Os for current day (as of 2017 07:55)  =============================================  IN: 2011.2 ml / OUT: 4500 ml / NET: -2488.8 ml    I&O's Detail    I & Os for current day (as of 2017 07:55)  =============================================  IN:    Oral Fluid: 1320 ml    Packed Red Blood Cells: 250 ml    heparin Infusion: 230 ml    sodium chloride 0.9%.: 120 ml    furosemide Infusion: 65 ml    furosemide Infusion: 15 ml    milrinone  Infusion: 11.2 ml    Total IN: 2011.2 ml  ---------------------------------------------  OUT:    Voided: 4500 ml    Total OUT: 4500 ml  ---------------------------------------------  Total NET: -2488.8 ml      Physical Exam:     General: No distress. Comfortable.  HEENT: EOM intact.  Neck: Neck supple. JVP moderately elevated. No masses  Chest: Clear to auscultation bilaterally  CV: Typical LVAD sounds. Normal S1 and S2. No distal pulses. 1+ edema around ankles.   Abdomen: Soft, non-distended, non-tender  Driveline exit site: Clean, dry, and intact  Skin: No rashes or skin breakdown  Neurology: Alert and oriented times three. Sensation intact  Psych: Affect appropriate    LVAD Interrogation: Heart Mate II  RPMs: 9200  Power: 5.4  Flow: 5.0  PI: 5.1  Event: None. No alarms  No programming changes were made    Labs:                        7.7    21.4  )-----------( 424      ( 2017 01:46 )             24.9         134<L>  |  100  |  47<H>  ----------------------------<  94  3.8   |  22  |  1.79<H>    Ca    8.2<L>      2017 01:46    TPro  6.0  /  Alb  2.8<L>  /  TBili  2.4<H>  /  DBili  x   /  AST  69<H>  /  ALT  46<H>  /  AlkPhos  136<H>      PT/INR - ( 2017 01:46 )   PT: 25.8 sec;   INR: 2.33 ratio      PTT - ( 2017 01:46 )  PTT:59.8 sec    Lactate Dehydrogenase, Serum: 364 U/L ( @ 01:46)

## 2017-06-25 NOTE — PROGRESS NOTE ADULT - PROBLEM SELECTOR PLAN 1
Continue Lasix drip (started yesterday)  He remains significantly volume overloaded, but improved from yesterday  MAP goal 70-80

## 2017-06-26 LAB
ALBUMIN SERPL ELPH-MCNC: 2.4 G/DL — LOW (ref 3.3–5)
ALP SERPL-CCNC: 117 U/L — SIGNIFICANT CHANGE UP (ref 40–120)
ALT FLD-CCNC: 38 U/L RC — SIGNIFICANT CHANGE UP (ref 10–45)
ANION GAP SERPL CALC-SCNC: 10 MMOL/L — SIGNIFICANT CHANGE UP (ref 5–17)
APTT BLD: 34.6 SEC — SIGNIFICANT CHANGE UP (ref 27.5–37.4)
AST SERPL-CCNC: 53 U/L — HIGH (ref 10–40)
BILIRUB SERPL-MCNC: 2 MG/DL — HIGH (ref 0.2–1.2)
BUN SERPL-MCNC: 51 MG/DL — HIGH (ref 7–23)
CALCIUM SERPL-MCNC: 8.2 MG/DL — LOW (ref 8.4–10.5)
CHLORIDE SERPL-SCNC: 99 MMOL/L — SIGNIFICANT CHANGE UP (ref 96–108)
CO2 SERPL-SCNC: 25 MMOL/L — SIGNIFICANT CHANGE UP (ref 22–31)
CREAT SERPL-MCNC: 1.84 MG/DL — HIGH (ref 0.5–1.3)
GAS PNL BLDA: SIGNIFICANT CHANGE UP
GLUCOSE SERPL-MCNC: 95 MG/DL — SIGNIFICANT CHANGE UP (ref 70–99)
HAPTOGLOB SERPL-MCNC: 163 MG/DL — SIGNIFICANT CHANGE UP (ref 34–200)
HCT VFR BLD CALC: 20.2 % — CRITICAL LOW (ref 39–50)
HCT VFR BLD CALC: 25.9 % — LOW (ref 39–50)
HGB BLD-MCNC: 6.8 G/DL — CRITICAL LOW (ref 13–17)
HGB BLD-MCNC: 8.8 G/DL — LOW (ref 13–17)
INR BLD: 1.9 RATIO — HIGH (ref 0.88–1.16)
INR BLD: 2.06 RATIO — HIGH (ref 0.88–1.16)
LDH SERPL L TO P-CCNC: 312 U/L — HIGH (ref 50–242)
MCHC RBC-ENTMCNC: 30.6 PG — SIGNIFICANT CHANGE UP (ref 27–34)
MCHC RBC-ENTMCNC: 30.8 PG — SIGNIFICANT CHANGE UP (ref 27–34)
MCHC RBC-ENTMCNC: 33.5 GM/DL — SIGNIFICANT CHANGE UP (ref 32–36)
MCHC RBC-ENTMCNC: 34.1 GM/DL — SIGNIFICANT CHANGE UP (ref 32–36)
MCV RBC AUTO: 90.4 FL — SIGNIFICANT CHANGE UP (ref 80–100)
MCV RBC AUTO: 91.4 FL — SIGNIFICANT CHANGE UP (ref 80–100)
PLATELET # BLD AUTO: 418 K/UL — HIGH (ref 150–400)
PLATELET # BLD AUTO: 481 K/UL — HIGH (ref 150–400)
POTASSIUM SERPL-MCNC: 3.6 MMOL/L — SIGNIFICANT CHANGE UP (ref 3.5–5.3)
POTASSIUM SERPL-SCNC: 3.6 MMOL/L — SIGNIFICANT CHANGE UP (ref 3.5–5.3)
PROT SERPL-MCNC: 5.6 G/DL — LOW (ref 6–8.3)
PROTHROM AB SERPL-ACNC: 21 SEC — HIGH (ref 9.8–12.7)
PROTHROM AB SERPL-ACNC: 22.6 SEC — HIGH (ref 9.8–12.7)
RBC # BLD: 2.21 M/UL — LOW (ref 4.2–5.8)
RBC # BLD: 2.87 M/UL — LOW (ref 4.2–5.8)
RBC # FLD: 18.9 % — HIGH (ref 10.3–14.5)
RBC # FLD: 20.3 % — HIGH (ref 10.3–14.5)
SODIUM SERPL-SCNC: 134 MMOL/L — LOW (ref 135–145)
WBC # BLD: 19.7 K/UL — HIGH (ref 3.8–10.5)
WBC # BLD: 21.7 K/UL — HIGH (ref 3.8–10.5)
WBC # FLD AUTO: 19.7 K/UL — HIGH (ref 3.8–10.5)
WBC # FLD AUTO: 21.7 K/UL — HIGH (ref 3.8–10.5)

## 2017-06-26 PROCEDURE — 99233 SBSQ HOSP IP/OBS HIGH 50: CPT | Mod: GC

## 2017-06-26 PROCEDURE — 99233 SBSQ HOSP IP/OBS HIGH 50: CPT | Mod: 25,GC

## 2017-06-26 PROCEDURE — 93306 TTE W/DOPPLER COMPLETE: CPT | Mod: 26

## 2017-06-26 PROCEDURE — 99292 CRITICAL CARE ADDL 30 MIN: CPT

## 2017-06-26 PROCEDURE — 99291 CRITICAL CARE FIRST HOUR: CPT

## 2017-06-26 PROCEDURE — 93750 INTERROGATION VAD IN PERSON: CPT

## 2017-06-26 RX ORDER — WARFARIN SODIUM 2.5 MG/1
4 TABLET ORAL ONCE
Qty: 0 | Refills: 0 | Status: DISCONTINUED | OUTPATIENT
Start: 2017-06-26 | End: 2017-06-26

## 2017-06-26 RX ORDER — WARFARIN SODIUM 2.5 MG/1
3 TABLET ORAL ONCE
Qty: 0 | Refills: 0 | Status: COMPLETED | OUTPATIENT
Start: 2017-06-26 | End: 2017-06-26

## 2017-06-26 RX ORDER — QUETIAPINE FUMARATE 200 MG/1
50 TABLET, FILM COATED ORAL AT BEDTIME
Qty: 0 | Refills: 0 | Status: DISCONTINUED | OUTPATIENT
Start: 2017-06-26 | End: 2017-07-17

## 2017-06-26 RX ORDER — POTASSIUM CHLORIDE 20 MEQ
10 PACKET (EA) ORAL ONCE
Qty: 0 | Refills: 0 | Status: COMPLETED | OUTPATIENT
Start: 2017-06-26 | End: 2017-06-26

## 2017-06-26 RX ORDER — CARVEDILOL PHOSPHATE 80 MG/1
3.12 CAPSULE, EXTENDED RELEASE ORAL EVERY 12 HOURS
Qty: 0 | Refills: 0 | Status: DISCONTINUED | OUTPATIENT
Start: 2017-06-26 | End: 2017-07-11

## 2017-06-26 RX ORDER — POTASSIUM CHLORIDE 20 MEQ
40 PACKET (EA) ORAL ONCE
Qty: 0 | Refills: 0 | Status: COMPLETED | OUTPATIENT
Start: 2017-06-26 | End: 2017-06-26

## 2017-06-26 RX ADMIN — WARFARIN SODIUM 3 MILLIGRAM(S): 2.5 TABLET ORAL at 22:15

## 2017-06-26 RX ADMIN — Medication 10 MILLIEQUIVALENT(S): at 02:52

## 2017-06-26 RX ADMIN — AMIODARONE HYDROCHLORIDE 200 MILLIGRAM(S): 400 TABLET ORAL at 05:19

## 2017-06-26 RX ADMIN — Medication 40 MILLIEQUIVALENT(S): at 08:59

## 2017-06-26 RX ADMIN — CARVEDILOL PHOSPHATE 3.12 MILLIGRAM(S): 80 CAPSULE, EXTENDED RELEASE ORAL at 20:06

## 2017-06-26 RX ADMIN — Medication 0.6 MILLIGRAM(S): at 11:25

## 2017-06-26 RX ADMIN — Medication 100 MILLIGRAM(S): at 22:15

## 2017-06-26 RX ADMIN — Medication 5 MG/HR: at 22:17

## 2017-06-26 RX ADMIN — Medication 81 MILLIGRAM(S): at 11:25

## 2017-06-26 RX ADMIN — Medication 0.6 MILLIGRAM(S): at 05:19

## 2017-06-26 RX ADMIN — Medication 100 MILLIGRAM(S): at 14:00

## 2017-06-26 RX ADMIN — QUETIAPINE FUMARATE 50 MILLIGRAM(S): 200 TABLET, FILM COATED ORAL at 22:15

## 2017-06-26 RX ADMIN — FAMOTIDINE 20 MILLIGRAM(S): 10 INJECTION INTRAVENOUS at 11:25

## 2017-06-26 RX ADMIN — Medication 25 MILLIGRAM(S): at 05:19

## 2017-06-26 RX ADMIN — QUETIAPINE FUMARATE 50 MILLIGRAM(S): 200 TABLET, FILM COATED ORAL at 05:19

## 2017-06-26 RX ADMIN — CARVEDILOL PHOSPHATE 3.12 MILLIGRAM(S): 80 CAPSULE, EXTENDED RELEASE ORAL at 08:59

## 2017-06-26 NOTE — PROGRESS NOTE ADULT - PROBLEM SELECTOR PLAN 3
No programming changes.  Continue aspirin 81 mg daily.  Coumadin dosing tonight with 3 mg given down trending INR and no overt bleeding, goal INR 2-2.5  LDH within acceptable range, haptoglobin normal range

## 2017-06-26 NOTE — PROGRESS NOTE ADULT - SUBJECTIVE AND OBJECTIVE BOX
[facility] DIV[facility] OF KIDNEY DISEASES AND HYPERTENSION -- [phone]   FOLLOW UP NOTE  --------------------------------------------------------------------------------  Chief Complaint: Heart failure      24hour events/Subjective: pt seen and examined. states he feels ok. denies sob/cp.        PAST HISTORY  --------------------------------------------------------------------------------  No significant changes to PMH, PSH, FHx, SHx, unless otherwise noted    ALLERGIES & MEDICATIONS  --------------------------------------------------------------------------------  Allergies    No Known Allergies    Intolerances      Standing Inpatient Medications  sodium chloride 0.9%. 1000milliLiter(s) IV Continuous <Continuous>  docusate sodium 100milliGRAM(s) Oral three times a day  dextrose 5%. 1000milliLiter(s) IV Continuous <Continuous>  dextrose 50% Injectable 25Gram(s) IV Push once  aspirin  chewable 81milliGRAM(s) Oral daily  amiodarone    Tablet 200milliGRAM(s) Oral daily  insulin lispro (HumaLOG) corrective regimen sliding scale  SubCutaneous three times a day before meals  insulin lispro (HumaLOG) corrective regimen sliding scale  SubCutaneous at bedtime  famotidine    Tablet 20milliGRAM(s) Oral daily  furosemide Infusion 10mG/Hr IV Continuous <Continuous>  colchicine 0.6milliGRAM(s) Oral daily  QUEtiapine 50milliGRAM(s) Oral at bedtime  carvedilol 3.125milliGRAM(s) Oral every 12 hours    PRN Inpatient Medications      REVIEW OF SYSTEMS  --------------------------------------------------------------------------------  As above         VITALS/PHYSICAL EXAM  --------------------------------------------------------------------------------  T(C): 37, Max: 37 (06-25 @ 16:00)  HR: 108 (100 - 135)  BP: --  RR: 21 (14 - 26)  SpO2: 100% (100% - 100%)  Wt(kg): --      I & Os for 24h ending 06-26-17 @ 07:00  =============================================  IN: 940 ml / OUT: 3650 ml / NET: -2710 ml    I & Os for current day (as of 06-26-17 @ 09:44)  =============================================  IN: 5 ml / OUT: 275 ml / NET: -270 ml    Physical Exam:  	  Gen: NAD, sitting up in chair   	HEENT: MMM  	Pulm: CTA B/L  	CV: S1S2  	Abd: Soft, +BS  	Ext: improving  LE edema B/L                      Neuro: Awake   	Skin: Warm and Dry   	Other ; no emily    LABS/STUDIES  --------------------------------------------------------------------------------              6.8    21.7  >-----------<  418      [06-26-17 @ 02:30]              20.2     134  |  99  |  51  ----------------------------<  95      [06-26-17 @ 02:30]  3.6   |  25  |  1.84        Ca     8.2     [06-26-17 @ 02:30]    TPro  5.6  /  Alb  2.4  /  TBili  2.0  /  DBili  x   /  AST  53  /  ALT  38  /  AlkPhos  117  [06-26-17 @ 02:30]    PT/INR: PT 22.6 , INR 2.06       [06-26-17 @ 02:30]  PTT: 34.6       [06-26-17 @ 02:30]          [06-26-17 @ 02:30]    Creatinine Trend:  SCr 1.84 [06-26 @ 02:30]  SCr 1.79 [06-25 @ 01:46]  SCr 1.73 [06-24 @ 00:27]  SCr 1.82 [06-23 @ 03:03]  SCr 1.94 [06-22 @ 02:01]    Urinalysis - [06-21-17 @ 18:17]      Color Yellow / Appearance SL Turbid / SG 1.010 / pH 7.0      Gluc Negative / Ketone Negative  / Bili Negative / Urobili 4       Blood Small / Protein 30 / Leuk Est Moderate / Nitrite Negative      RBC 3-5 / WBC 6-10 / Hyaline  / Gran  / Sq Epi  / Non Sq Epi OCC / Bacteria Few    Urine Creatinine 32      [06-21-17 @ 22:07]  Urine Protein 53      [06-21-17 @ 22:07]  Urine Sodium 42      [06-21-17 @ 18:17]  Urine Chloride 46      [06-21-17 @ 18:17]    Iron 20, TIBC 352, %sat 6      [06-08-17 @ 07:14]  Ferritin 121.0      [06-09-17 @ 19:08]  HbA1c 5.5      [06-07-17 @ 06:14]  TSH 2.33      [06-14-17 @ 14:51]  Lipid: chol 62, TG 33, HDL 17, LDL 38      [06-07-17 @ 06:14]

## 2017-06-26 NOTE — PROGRESS NOTE ADULT - ATTENDING COMMENTS
Transferred to [address] [place] [address] today. Doing well but with persistent elevation in JVP. Will continue IV diuretics off of milrinone. His hematocrit has decreased without obvious source of bleeding. We will evaluate and continue to monitor. Will continue VAD teaching tomorrow. Ongoing PT.

## 2017-06-26 NOTE — PROGRESS NOTE ADULT - PROBLEM SELECTOR PLAN 2
Continue aspirin 81 mg daily.  Coumadin dosing tonight with 3 mg given down trending INR and no  bleeding, goal INR 2-2.5  ambulation, d/c planning

## 2017-06-26 NOTE — PROGRESS NOTE ADULT - SUBJECTIVE AND OBJECTIVE BOX
When am i getting out of here?    VITAL SIGNS    Telemetry:  Nsr , PVC's    Vital Signs Last 24 Hrs  T(C): 36.8, Max: 37 ( @ 00:00)  T(F): 98.2, Max: 98.6 ( @ 00:00)  HR: 115 (99 - 124)  BP: --  RR: 18 (13 - 31)  SpO2: 100% (100% - 100%)  Wt(kg): --             I&O's Detail  I & Os for 24h ending 2017 07:00  =============================================  IN:    Oral Fluid: 800 ml    furosemide Infusion: 120 ml    heparin Infusion: 20 ml    Total IN: 940 ml  ---------------------------------------------  OUT:    Voided: 3650 ml    Total OUT: 3650 ml  ---------------------------------------------  Total NET: -2710 ml    I & Os for current day (as of 2017 17:46)  =============================================  IN:    Packed Red Blood Cells: 250 ml    furosemide Infusion: 25 ml    Total IN: 275 ml  ---------------------------------------------  OUT:    Voided: 725 ml    Total OUT: 725 ml  ---------------------------------------------  Total NET: -450 ml      I & Os for 24h ending  @ 07:00  =============================================  IN: 940 ml / OUT: 3650 ml / NET: -2710 ml    I & Os for current day (as of  @ 17:46)  =============================================  IN: 275 ml / OUT: 725 ml / NET: -450 ml        Daily     Daily Weight in k.8 (2017 00:00)  Admit Wt: Drug Dosing Weight  Height (cm): 172.7 (2017 17:13)  Weight (kg): 75 (2017 15:17)  BMI (kg/m2): 25.1 (2017 17:13)  BSA (m2): 1.88 (2017 17:13)    Bilirubin Total, Serum: 2.0 mg/dL ( @ 02:30)    CAPILLARY BLOOD GLUCOSE  92 (2017 16:07)  94 (2017 08:00)  109 (2017 22:00)          Drains:     MS         [  ] Drainage:                 L Pleural  [  ]  Drainage:                R Pleural  [  ]  Drainage:    Pacing Wires        [  ]   Settings:                                  Isolated  [  ]    Coumadin    [ ] YES          [  ]      NO         Reason:                           MEDICATIONS  sodium chloride 0.9%. 1000milliLiter(s) IV Continuous <Continuous>  docusate sodium 100milliGRAM(s) Oral three times a day  dextrose 5%. 1000milliLiter(s) IV Continuous <Continuous>  dextrose 50% Injectable 25Gram(s) IV Push once  aspirin  chewable 81milliGRAM(s) Oral daily  amiodarone    Tablet 200milliGRAM(s) Oral daily  insulin lispro (HumaLOG) corrective regimen sliding scale  SubCutaneous three times a day before meals  insulin lispro (HumaLOG) corrective regimen sliding scale  SubCutaneous at bedtime  famotidine    Tablet 20milliGRAM(s) Oral daily  furosemide Infusion 10mG/Hr IV Continuous <Continuous>  colchicine 0.6milliGRAM(s) Oral daily  QUEtiapine 50milliGRAM(s) Oral at bedtime  carvedilol 3.125milliGRAM(s) Oral every 12 hours  warfarin 4milliGRAM(s) Oral once      PHYSICAL EXAM    Subjective:    Neurology: alert and oriented x 3, nonfocal, no gross deficits    CV :    Sternal Wound :  CDI , Stable    Lungs:    Abdomen: soft, nontender, nondistended, positive bowel sounds, last bowel movement     :           Rosas  [  ]    Extremities:                   LABS      134<L>  |  99  |  51<H>  ----------------------------<  95  3.6   |  25  |  1.84<H>    Ca    8.2<L>      2017 02:30    TPro  5.6<L>  /  Alb  2.4<L>  /  TBili  2.0<H>  /  DBili  x   /  AST  53<H>  /  ALT  38  /  AlkPhos  117                                   8.8    19.7  )-----------( 481      ( 2017 14:31 )             25.9          PT/INR - ( 2017 14:31 )   PT: 21.0 sec;   INR: 1.90 ratio         PTT - ( 2017 02:30 )  PTT:34.6 sec           CXR:       PAST MEDICAL & SURGICAL HISTORY:  H/O prior ablation treatment: for Afib  Ventricular fibrillation: s/p AICD  PAF (paroxysmal atrial fibrillation): on xarelto  Non-Ischemic Cardiomyopathy  SVT (Supraventricular Tachycardia)  HTN  CHF (Congestive Heart Failure)  Status post left hip replacement  Hypertension  Hypertension  History of Prior Ablation Treatment: for afib  AICD (Automatic Cardioverter/Defibrillator) Present: [employer] with 1 [employer] lead09- explanted and replaced with Medtronic 2 leads on 09             Physical Therapy Rec:   Home  [  ]   Home w/ PT  [  ]  Rehab  [  ]    Discussed with Cardiothoracic Team at AM rounds. When am i getting out of here?    VITAL SIGNS    Telemetry:  Nsr , PVC's 100    Vital Signs Last 24 Hrs  T(C): 36.8, Max: 37 ( @ 00:00)  T(F): 98.2, Max: 98.6 ( @ 00:00)  HR: 115 (99 - 124)  Mean 66-86  RR: 18 (13 - 31)  SpO2: 100% (100% - 100%)  Wt(kg): --             I&O's Detail  I & Os for 24h ending 2017 07:00  =============================================  IN:    Oral Fluid: 800 ml    furosemide Infusion: 120 ml    heparin Infusion: 20 ml    Total IN: 940 ml  ---------------------------------------------  OUT:    Voided: 3650 ml    Total OUT: 3650 ml  ---------------------------------------------  Total NET: -2710 ml    I & Os for current day (as of 2017 17:46)  =============================================  IN:    Packed Red Blood Cells: 250 ml    furosemide Infusion: 25 ml    Total IN: 275 ml  ---------------------------------------------  OUT:    Voided: 725 ml    Total OUT: 725 ml  ---------------------------------------------  Total NET: -450 ml      I & Os for 24h ending  @ 07:00  =============================================  IN: 940 ml / OUT: 3650 ml / NET: -2710 ml    I & Os for current day (as of  @ 17:46)  =============================================  IN: 275 ml / OUT: 725 ml / NET: -450 ml        Daily     Daily Weight in k.8 (2017 00:00)  Admit Wt: Drug Dosing Weight  Height (cm): 172.7 (2017 17:13)  Weight (kg): 75 (2017 15:17)  BMI (kg/m2): 25.1 (2017 17:13)  BSA (m2): 1.88 (2017 17:13)    Bilirubin Total, Serum: 2.0 mg/dL ( @ 02:30)    CAPILLARY BLOOD GLUCOSE  92 (2017 16:07)  94 (2017 08:00)  109 (2017 22:00)          Drains:    no     MS         [  ] Drainage:                 L Pleural  [  ]  Drainage:                R Pleural  [  ]  Drainage:    Pacing Wires           [  ]   Settings:                                  Isolated  [  ]    Coumadin    [x ] YES          [  ]      NO         Reason:     lvad                      MEDICATIONS  sodium chloride 0.9%. 1000milliLiter(s) IV Continuous <Continuous>  docusate sodium 100milliGRAM(s) Oral three times a day  dextrose 5%. 1000milliLiter(s) IV Continuous <Continuous>  dextrose 50% Injectable 25Gram(s) IV Push once  aspirin  chewable 81milliGRAM(s) Oral daily  amiodarone    Tablet 200milliGRAM(s) Oral daily  insulin lispro (HumaLOG) corrective regimen sliding scale  SubCutaneous three times a day before meals  insulin lispro (HumaLOG) corrective regimen sliding scale  SubCutaneous at bedtime  famotidine    Tablet 20milliGRAM(s) Oral daily  furosemide Infusion 10mG/Hr IV Continuous <Continuous>  colchicine 0.6milliGRAM(s) Oral daily  QUEtiapine 50milliGRAM(s) Oral at bedtime  carvedilol 3.125milliGRAM(s) Oral every 12 hours  warfarin 4milliGRAM(s) Oral once      PHYSICAL EXAM      Neurology: alert and oriented x 3, nonfocal, no gross deficits    CV : S1 S2 , RRR     Sternal Wound :  CDI , Stable    Lungs: Diminished LLL    Abdomen: soft, nontender, nondistended, positive bowel sounds, last bowel movement     :      voiding    Extremities:    +   edema, negative calve tenderness,   leg incision cdi               LABS      134<L>  |  99  |  51<H>  ----------------------------<  95  3.6   |  25  |  1.84<H>    Ca    8.2<L>      2017 02:30    TPro  5.6<L>  /  Alb  2.4<L>  /  TBili  2.0<H>  /  DBili  x   /  AST  53<H>  /  ALT  38  /  AlkPhos  117                                   8.8    19.7  )-----------( 481      ( 2017 14:31 )             25.9          PT/INR - ( 2017 14:31 )   PT: 21.0 sec;   INR: 1.90 ratio         PTT - ( 2017 02:30 )  PTT:34.6 sec           CXR:Findings: Patient is status post median sternotomy. AICD device and LV   assist device persist.    Heart is enlarged. There is a left pleural effusion. Retrocardiac opacity   is present.    Impression: Left base atelectasis versus pneumonia. Left pleural effusion.       PAST MEDICAL & SURGICAL HISTORY:  H/O prior ablation treatment: for Afib  Ventricular fibrillation: s/p AICD  PAF (paroxysmal atrial fibrillation): on xarelto  Non-Ischemic Cardiomyopathy  SVT (Supraventricular Tachycardia)  HTN  CHF (Congestive Heart Failure)  Status post left hip replacement  Hypertension  Hypertension  History of Prior Ablation Treatment: for afib  AICD (Automatic Cardioverter/Defibrillator) Present: [employer] with 1 [employer] lead09- explanted and replaced with Medtr[employer] 2 leads on 09             Physical Therapy Rec:   Home  [  ]   Home w/ PT  [  ]  Rehab  [  ]    Discussed with Cardiothoracic Team at AM rounds.

## 2017-06-26 NOTE — CHART NOTE - NSCHARTNOTEFT_GEN_A_CORE
Pt with end stage cardiomyopathy, cardiogenic shock s/p LVAD, and NORMAN on CKD post LVAD, now with with renal function.    Pt seen for second calorie count.  Day 1- 1596kcal, 42gm prot  Day 2- 2225kcal, 50gm prot  Day 3- 861kcal, 41gm prot  Average: 1560kcal and 44gm prot. Meeting 74% kcal needs and 47% protein needs.     Noted per previous RD note pt was consuming average of 746kcal and 38gm prot (35% kcal needs and 40% protein needs) based on results of initial calorie count. Pt now with improved intake, however, needs further improvement. Discussed with pt strategies to increase PO intake. Pt states he is drinking maybe 1 bottle of Ensure Enlive/day and dislikes Ensure Pudding. Pt willing to increased consumption of Ensure Enlive to 2 bottles/day. Discussed menu options to promote PO intake, and assisted pt with menu selections for tomorrow. Pt states he is having difficulty setting up his meals due to swollen hand, and needs assistance- d/w team.     Pt also receptive to instruction on Coumadin/Vitamin K interaction and nutrition therapy for CHF. Pt verbalized some understanding of Coumadin/Vitamin K interaction and able to state teach-back points of nutrition therapy for CHF (low sodium/fluid).     Diet : low sodium, soft, Ensure Enlive 3 cans/day, Ensure Pudding 3 times/day.    Admission weight: 72.9kg  Current Weight: 72.8kg  Weight fluctuations noted, suspect related to fluid shifts. Pt with 1+generalized and 2+left hand/arm edema.    Pertinent Medications: MEDICATIONS  (STANDING):  sodium chloride 0.9%. 1000milliLiter(s) IV Continuous <Continuous>  docusate sodium 100milliGRAM(s) Oral three times a day  dextrose 5%. 1000milliLiter(s) IV Continuous <Continuous>  dextrose 50% Injectable 25Gram(s) IV Push once  aspirin  chewable 81milliGRAM(s) Oral daily  amiodarone    Tablet 200milliGRAM(s) Oral daily  insulin lispro (HumaLOG) corrective regimen sliding scale  SubCutaneous three times a day before meals  insulin lispro (HumaLOG) corrective regimen sliding scale  SubCutaneous at bedtime  famotidine    Tablet 20milliGRAM(s) Oral daily  furosemide Infusion 10mG/Hr IV Continuous <Continuous>  colchicine 0.6milliGRAM(s) Oral daily  QUEtiapine 50milliGRAM(s) Oral at bedtime  carvedilol 3.125milliGRAM(s) Oral every 12 hours      Pertinent Labs:    Hemoglobin: 6.8g/dL (06.26.17 @ 02:30) - low  Hematocrit: 20.2 % (06.26.17 @ 02:30) - low  Comprehensive Metabolic Panel (06.26.17 @ 02:30)    Sodium, Serum: 134 mmol/L - low    Potassium, Serum: 3.6 mmol/L    Chloride, Serum: 99 mmol/L    Carbon Dioxide, Serum: 25 mmol/L    Anion Gap, Serum: 10 mmol/L    Blood Urea Nitrogen, Serum: 51 mg/dL - high    Creatinine, Serum: 1.84 mg/dL - high    Glucose, Serum: 95 mg/dL     Calcium, Total Serum: 8.2 mg/dL    Protein Total, Serum: 5.6 g/dL - low    Albumin, Serum: 2.4 g/dL - low    Bilirubin Total, Serum: 2.0 mg/dL - high    Alkaline Phosphatase, Serum: 117 U/L    Aspartate Aminotransferase (AST/SGOT): 53 U/L - high    Alanine Aminotransferase (ALT/SGPT): 38 U/L RC  Lactate Dehydrogenase, Serum: 312 U/L (06.26.17 @ 02:30) - high  Arterial Line Glucose:(6/26)94mg/dL, (6/25)89-109m/dL    Skin: No pressure ulcers noted    Estimated Needs:   [ x ] no change since previous assessment  [ ] recalculated:       Previous Nutrition Diagnosis:   Malnutrition       Nutrition Diagnosis is [ x ] ongoing  [ ] resolved [ ] not applicable   Being addressed with PO diet/supplements, encouragement with meals       New Nutrition Diagnosis: [ x ] not applicable       Interventions:     Recommend  1. Encourage PO intake with all meals/supplements.  2. Provide food preferences within therapeutic diet when requested.   3. Reviewed alternate menu options and menu ordering procedure.   4. Discussed importance of increased PO intake and protein rich foods postoperatively.   5. Provide assistance with meals as needed.  6. d/c Ensure pudding as pt dislikes.  7. Provided review on nutrition therapy for CHF and Coumadin/Vitamin K interaction.     d/w team     Monitoring and Evaluation:     [ x ] PO intake [ x ] Tolerance to diet prescription [ x ] weights [ x ] follow up per protocol    [ ] other:

## 2017-06-26 NOTE — PROGRESS NOTE ADULT - ASSESSMENT
67 year old man with congestive heart failure with severely reduced LV systolic function due to a nonischemic dilated cardiomyopathy   s/p HM2 LVAD on 6/12,  prolonged intubation.  post-operative course complicated by self-terminating ventricular tachycardia and acute renal failure, which is stable.  He is having persistent SVT, likely atrial tachycardia. His hematocrit is decreased and required 1 unit of pRBCs  today. He has persistent leukocytosis without evidence of infection and completed a course of antibiotics, cultures negative.  His left  hand is swollen and tender. He remains on a lasix  infusion.  Transferred to SDU 6/26

## 2017-06-26 NOTE — PROGRESS NOTE ADULT - SUBJECTIVE AND OBJECTIVE BOX
CRITICAL CARE ATTENDING - CTICU    MEDICATIONS  (STANDING):  sodium chloride 0.9%. 1000milliLiter(s) IV Continuous <Continuous>  docusate sodium 100milliGRAM(s) Oral three times a day  dextrose 5%. 1000milliLiter(s) IV Continuous <Continuous>  dextrose 50% Injectable 25Gram(s) IV Push once  aspirin  chewable 81milliGRAM(s) Oral daily  amiodarone    Tablet 200milliGRAM(s) Oral daily  insulin lispro (HumaLOG) corrective regimen sliding scale  SubCutaneous three times a day before meals  insulin lispro (HumaLOG) corrective regimen sliding scale  SubCutaneous at bedtime  famotidine    Tablet 20milliGRAM(s) Oral daily  furosemide Infusion 10mG/Hr IV Continuous <Continuous>  colchicine 0.6milliGRAM(s) Oral daily  QUEtiapine 50milliGRAM(s) Oral at bedtime  carvedilol 3.125milliGRAM(s) Oral every 12 hours                                    6.8    21.7  )-----------( 418      ( 2017 02:30 )             20.2           134<L>  |  99  |  51<H>  ----------------------------<  95  3.6   |  25  |  1.84<H>    Ca    8.2<L>      2017 02:30    TPro  5.6<L>  /  Alb  2.4<L>  /  TBili  2.0<H>  /  DBili  x   /  AST  53<H>  /  ALT  38  /  AlkPhos  117        PT/INR - ( 2017 02:30 )   PT: 22.6 sec;   INR: 2.06 ratio         PTT - ( 2017 02:30 )  PTT:34.6 sec        Daily     Daily Weight in k.8 (2017 00:00)    I & Os for 24h ending  @ 07:00  =============================================  IN: 940 ml / OUT: 3650 ml / NET: -2710 ml    I & Os for current day (as of 06-26 @ 10:31)  =============================================  IN: 15 ml / OUT: 275 ml / NET: -260 ml      Critically Ill patient  : [ ] preoperative ,   [x ] post operative    Requires :  [x ] Arterial Line   [x ] Central Line  [ ] PA catheter  [ ] IABP  [ ] ECMO  [x ] LVAD  [ ] Ventilator  [x ] pacemaker [ ] Impella.    Bedside evaluation , monitoring , treatment of hemodynamics , fluids , IVP/ IVCD meds.        Diagnosis:     Cardiogenic Shock  - resolving    CHF- acute [x   chronic [x ]    systolic [x ]   diatolic [ ]          - Echo- EF - 10           x[ ] RV dysfunction          - Cxr-cardiomegally, edema          - Clinical-  [x ]  i notropes   [ ]pressors   [x ]diuresis   [ ]IABP   [ ]ECMO   [x ]LVAD   [ ]Respiratory Failure    fluid overload     Renal Failure A Fib    IVCD anticoagulation with [x ] Heparin  [ ] Argatroban for A Fib    Anemia s/p transfusion    s/p AICD-PPM    LVAD circuit                      -                 Discussed with CT surgeon, Physician's Assistant - Nurse Practitioner- Critical care medicine team.   Dicussed at  AM / PM rounds.   Chart, labs , films reviewed.    Total Time: 120 min.

## 2017-06-26 NOTE — PROGRESS NOTE ADULT - ATTENDING COMMENTS
NORMAN on CKD post LVAD: Likely in the setting of cardiogenic shock   Renal function improving slowly  Currently with no e/o obstruction with excellent urine output despite diaz removal    Continue with Diuresis per heart failure team/CTICU  as he is still volume overloaded on exam   Proteinuria of 1.7 gms noted. Recommend check SPEP/SIFE/Immunoglobulin light chains  Anemia: pRBC per primary team  No evidence of hemolysis     Godwin Lucia MD NORMAN on CKD post LVAD: Likely in the setting of cardiogenic shock   Renal function stable with excellent urine output despite diaz removal   Diuresis per CTICU/heart failure   Proteinuria of 1.7 gms noted. Recommend check SPEP/SIFE/Immunoglobulin light chains    Anemia: pRBC per primary team     Godwin Lucia MD

## 2017-06-26 NOTE — PROGRESS NOTE ADULT - PROBLEM SELECTOR PLAN 1
hemodynamically mediated in setting of decompensated heart failure +/- urinary retention component and hypotension.  Pt with stable  SCr  1.8  today (from 1.79 yesterday).  Pt with  good urine output.  Monitor BMP  Strict I/O, avoid nephrotoxics, NSAIDs, RCA. Renal dose of medications.

## 2017-06-26 NOTE — PROGRESS NOTE ADULT - ASSESSMENT
67 year old man with ACC/AHA stage D congestive heart failure with severely reduced LV systolic function due to a nonischemic dilated cardiomyopathy s/p HM2 LVAD on 6/12 with post-operative course complicated by hemodynamically but self-terminating ventricular tachycardia and acute renal failure, which is improving. He continues to improve functionally now off of milrinone, but is having persistent SVT, likely atrial tachycardia. His hematocrit is decreased and required 1 unit of pRBCs but is without overt bleeding. He has persistent leukocytosis without joshua evidence of infection. His right hand is swollen and tender but without erythema.

## 2017-06-26 NOTE — PROGRESS NOTE ADULT - SUBJECTIVE AND OBJECTIVE BOX
Interval History:    Small down trend in H&H again this morning without any overt evidence of bleeding. S/p 1 unit pRBC. Still hypervolemic on exam.     Medications:  aspirin  chewable 81milliGRAM(s) Oral daily  amiodarone    Tablet 200milliGRAM(s) Oral daily  insulin lispro (HumaLOG) corrective regimen sliding scale  SubCutaneous three times a day before meals  insulin lispro (HumaLOG) corrective regimen sliding scale  SubCutaneous at bedtime  furosemide Infusion 10mG/Hr IV Continuous <Continuous>  colchicine 0.6milliGRAM(s) Oral daily  QUEtiapine 50milliGRAM(s) Oral at bedtime  carvedilol 3.125milliGRAM(s) Oral every 12 hours    Vitals:  T(C): 36.8, Max: 37 (06-25 @ 16:00)  HR: 107 (99 - 135)  ABP: 108/93 (60/54 - 108/93)  ABP(mean): 78 (57 - 100)  RR: 21 (14 - 31)  SpO2: 100% (100% - 100%)    Daily     Daily Weight in k.8 (2017 00:00)      I&O's Summary  I & Os for 24h ending 2017 07:00  =============================================  IN: 940 ml / OUT: 3650 ml / NET: -2710 ml    I & Os for current day (as of 2017 13:30)  =============================================  IN: 275 ml / OUT: 525 ml / NET: -250 ml      Physical Exam:  Appearance: No Acute Distress  HEENT: JVP to lower 1/3 of neck  Cardiovascular: Audible S1 S2, +LVAD hum  Respiratory: Clear to auscultation bilaterally  Gastrointestinal: Soft, Non-tender	  Skin: No cyanosis	  Neurologic: Non-focal  Extremities: 2+ LE edema  Psychiatry: A & O x 3, Mood & affect appropriate    LVAD Interrogation:HM2  Pump Flow: 4.3  Pump Speed: 9200  Pulse Index: 6.4  Pump Power: 5.1  VAD Events: One PI event at 8am  Driveline evaluation: Soft, NT  Programming Changes: No changes made    Labs:                        6.8    21.7  )-----------( 418      ( 2017 02:30 )             20.2         134<L>  |  99  |  51<H>  ----------------------------<  95  3.6   |  25  |  1.84<H>    Ca    8.2<L>      2017 02:30    TPro  5.6<L>  /  Alb  2.4<L>  /  TBili  2.0<H>  /  DBili  x   /  AST  53<H>  /  ALT  38  /  AlkPhos  117      PT/INR - ( 2017 02:30 )   PT: 22.6 sec;   INR: 2.06 ratio         PTT - ( 2017 02:30 )  PTT:34.6 sec      Lactate Dehydrogenase, Serum: 312 U/L ( @ 02:30)  Lactate Dehydrogenase, Serum: 364 U/L ( @ 01:46)    Haptoglobin, Serum: 163 mg/dL (17 @ 06:13)      TELEMETRY:

## 2017-06-27 DIAGNOSIS — D62 ACUTE POSTHEMORRHAGIC ANEMIA: ICD-10-CM

## 2017-06-27 LAB
ALBUMIN SERPL ELPH-MCNC: 2.6 G/DL — LOW (ref 3.3–5)
ALP SERPL-CCNC: 133 U/L — HIGH (ref 40–120)
ALT FLD-CCNC: 46 U/L RC — HIGH (ref 10–45)
ANION GAP SERPL CALC-SCNC: 13 MMOL/L — SIGNIFICANT CHANGE UP (ref 5–17)
ANION GAP SERPL CALC-SCNC: 17 MMOL/L — SIGNIFICANT CHANGE UP (ref 5–17)
AST SERPL-CCNC: 73 U/L — HIGH (ref 10–40)
BILIRUB SERPL-MCNC: 2.1 MG/DL — HIGH (ref 0.2–1.2)
BLD GP AB SCN SERPL QL: NEGATIVE — SIGNIFICANT CHANGE UP
BUN SERPL-MCNC: 53 MG/DL — HIGH (ref 7–23)
BUN SERPL-MCNC: 53 MG/DL — HIGH (ref 7–23)
CALCIUM SERPL-MCNC: 8.2 MG/DL — LOW (ref 8.4–10.5)
CALCIUM SERPL-MCNC: 8.6 MG/DL — SIGNIFICANT CHANGE UP (ref 8.4–10.5)
CHLORIDE SERPL-SCNC: 98 MMOL/L — SIGNIFICANT CHANGE UP (ref 96–108)
CHLORIDE SERPL-SCNC: 99 MMOL/L — SIGNIFICANT CHANGE UP (ref 96–108)
CO2 SERPL-SCNC: 21 MMOL/L — LOW (ref 22–31)
CO2 SERPL-SCNC: 22 MMOL/L — SIGNIFICANT CHANGE UP (ref 22–31)
CREAT SERPL-MCNC: 1.64 MG/DL — HIGH (ref 0.5–1.3)
CREAT SERPL-MCNC: 1.81 MG/DL — HIGH (ref 0.5–1.3)
GLUCOSE SERPL-MCNC: 87 MG/DL — SIGNIFICANT CHANGE UP (ref 70–99)
GLUCOSE SERPL-MCNC: 94 MG/DL — SIGNIFICANT CHANGE UP (ref 70–99)
HCT VFR BLD CALC: 22.4 % — LOW (ref 39–50)
HCT VFR BLD CALC: 28.3 % — LOW (ref 39–50)
HGB BLD-MCNC: 7.1 G/DL — LOW (ref 13–17)
HGB BLD-MCNC: 9.4 G/DL — LOW (ref 13–17)
INR BLD: 2.05 RATIO — HIGH (ref 0.88–1.16)
LDH SERPL L TO P-CCNC: 386 U/L — HIGH (ref 50–242)
MCHC RBC-ENTMCNC: 29.3 PG — SIGNIFICANT CHANGE UP (ref 27–34)
MCHC RBC-ENTMCNC: 30.8 PG — SIGNIFICANT CHANGE UP (ref 27–34)
MCHC RBC-ENTMCNC: 31.9 GM/DL — LOW (ref 32–36)
MCHC RBC-ENTMCNC: 33.4 GM/DL — SIGNIFICANT CHANGE UP (ref 32–36)
MCV RBC AUTO: 91.8 FL — SIGNIFICANT CHANGE UP (ref 80–100)
MCV RBC AUTO: 92.3 FL — SIGNIFICANT CHANGE UP (ref 80–100)
OB PNL STL: POSITIVE
PLATELET # BLD AUTO: 430 K/UL — HIGH (ref 150–400)
PLATELET # BLD AUTO: 492 K/UL — HIGH (ref 150–400)
POTASSIUM SERPL-MCNC: 3.6 MMOL/L — SIGNIFICANT CHANGE UP (ref 3.5–5.3)
POTASSIUM SERPL-MCNC: 3.8 MMOL/L — SIGNIFICANT CHANGE UP (ref 3.5–5.3)
POTASSIUM SERPL-SCNC: 3.6 MMOL/L — SIGNIFICANT CHANGE UP (ref 3.5–5.3)
POTASSIUM SERPL-SCNC: 3.8 MMOL/L — SIGNIFICANT CHANGE UP (ref 3.5–5.3)
PROT SERPL-MCNC: 6.4 G/DL — SIGNIFICANT CHANGE UP (ref 6–8.3)
PROTHROM AB SERPL-ACNC: 22.7 SEC — HIGH (ref 9.8–12.7)
RBC # BLD: 2.44 M/UL — LOW (ref 4.2–5.8)
RBC # BLD: 3.06 M/UL — LOW (ref 4.2–5.8)
RBC # FLD: 18.2 % — HIGH (ref 10.3–14.5)
RBC # FLD: 19 % — HIGH (ref 10.3–14.5)
RH IG SCN BLD-IMP: POSITIVE — SIGNIFICANT CHANGE UP
SODIUM SERPL-SCNC: 134 MMOL/L — LOW (ref 135–145)
SODIUM SERPL-SCNC: 136 MMOL/L — SIGNIFICANT CHANGE UP (ref 135–145)
WBC # BLD: 12.5 K/UL — HIGH (ref 3.8–10.5)
WBC # BLD: 16.3 K/UL — HIGH (ref 3.8–10.5)
WBC # FLD AUTO: 12.5 K/UL — HIGH (ref 3.8–10.5)
WBC # FLD AUTO: 16.3 K/UL — HIGH (ref 3.8–10.5)

## 2017-06-27 PROCEDURE — 99233 SBSQ HOSP IP/OBS HIGH 50: CPT | Mod: 25,GC

## 2017-06-27 PROCEDURE — 93750 INTERROGATION VAD IN PERSON: CPT

## 2017-06-27 PROCEDURE — 99222 1ST HOSP IP/OBS MODERATE 55: CPT | Mod: GC

## 2017-06-27 PROCEDURE — 99233 SBSQ HOSP IP/OBS HIGH 50: CPT | Mod: GC

## 2017-06-27 RX ORDER — PANTOPRAZOLE SODIUM 20 MG/1
8 TABLET, DELAYED RELEASE ORAL
Qty: 80 | Refills: 0 | Status: DISCONTINUED | OUTPATIENT
Start: 2017-06-27 | End: 2017-07-06

## 2017-06-27 RX ORDER — FAMOTIDINE 10 MG/ML
20 INJECTION INTRAVENOUS AT BEDTIME
Qty: 0 | Refills: 0 | Status: DISCONTINUED | OUTPATIENT
Start: 2017-06-27 | End: 2017-06-27

## 2017-06-27 RX ORDER — PANTOPRAZOLE SODIUM 20 MG/1
40 TABLET, DELAYED RELEASE ORAL
Qty: 0 | Refills: 0 | Status: DISCONTINUED | OUTPATIENT
Start: 2017-06-27 | End: 2017-06-27

## 2017-06-27 RX ORDER — MAGNESIUM SULFATE 500 MG/ML
1 VIAL (ML) INJECTION ONCE
Qty: 0 | Refills: 0 | Status: COMPLETED | OUTPATIENT
Start: 2017-06-27 | End: 2017-06-27

## 2017-06-27 RX ORDER — POTASSIUM CHLORIDE 20 MEQ
20 PACKET (EA) ORAL ONCE
Qty: 0 | Refills: 0 | Status: COMPLETED | OUTPATIENT
Start: 2017-06-27 | End: 2017-06-27

## 2017-06-27 RX ORDER — PANTOPRAZOLE SODIUM 20 MG/1
5 TABLET, DELAYED RELEASE ORAL
Qty: 80 | Refills: 0 | Status: DISCONTINUED | OUTPATIENT
Start: 2017-06-27 | End: 2017-06-27

## 2017-06-27 RX ADMIN — CARVEDILOL PHOSPHATE 3.12 MILLIGRAM(S): 80 CAPSULE, EXTENDED RELEASE ORAL at 17:42

## 2017-06-27 RX ADMIN — Medication 100 MILLIGRAM(S): at 13:33

## 2017-06-27 RX ADMIN — PANTOPRAZOLE SODIUM 10 MG/HR: 20 TABLET, DELAYED RELEASE ORAL at 19:59

## 2017-06-27 RX ADMIN — CARVEDILOL PHOSPHATE 3.12 MILLIGRAM(S): 80 CAPSULE, EXTENDED RELEASE ORAL at 05:46

## 2017-06-27 RX ADMIN — AMIODARONE HYDROCHLORIDE 200 MILLIGRAM(S): 400 TABLET ORAL at 05:46

## 2017-06-27 RX ADMIN — Medication 100 MILLIGRAM(S): at 05:46

## 2017-06-27 RX ADMIN — Medication 81 MILLIGRAM(S): at 13:33

## 2017-06-27 RX ADMIN — Medication 100 GRAM(S): at 01:00

## 2017-06-27 RX ADMIN — Medication 20 MILLIEQUIVALENT(S): at 01:55

## 2017-06-27 RX ADMIN — Medication 0.6 MILLIGRAM(S): at 13:33

## 2017-06-27 RX ADMIN — Medication 5 MG/HR: at 13:33

## 2017-06-27 RX ADMIN — QUETIAPINE FUMARATE 50 MILLIGRAM(S): 200 TABLET, FILM COATED ORAL at 21:02

## 2017-06-27 NOTE — PROGRESS NOTE ADULT - PROBLEM SELECTOR PLAN 1
hemodynamically mediated in setting of decompensated heart failure +/- urinary retention component and hypotension.  Pt with stable  SCr  1.6   today (from 1.8 yesterday).  Pt with  good urine output.  Monitor BMP  Strict I/O, avoid nephrotoxics, NSAIDs, RCA. Renal dose of medications.

## 2017-06-27 NOTE — PROGRESS NOTE ADULT - PROBLEM SELECTOR PLAN 2
Stable off of milrinone (discontinued 6/24) No programming changes.  Discontinue aspirin and coumadin while evaluating GI bleed.  LDH within acceptable range, continues to improve

## 2017-06-27 NOTE — PROGRESS NOTE ADULT - ASSESSMENT
67 year old man with congestive heart failure with severely reduced LV systolic function due to a nonischemic dilated cardiomyopathy   s/p HM2 LVAD on 6/12,  prolonged intubation.  post-operative course complicated by self-terminating ventricular tachycardia and acute renal failure, which is stable.  He is having persistent SVT, likely atrial tachycardia. His hematocrit is decreased and required 1 unit of pRBCs  today. He has persistent leukocytosis without evidence of infection and completed a course of antibiotics, cultures negative.  His left  hand is swollen and tender. He remains on a lasix  infusion.  Transferred to SDU 6/26  HCT 22 this am , s/p prbc x 1, repeat hct pending, guiac pending. Protonix added. He remains on the lasix infusion, will d/w Dr Stahl  weaning the infusion.  Protein  in the urine as d/w renal , blood work ordered.

## 2017-06-27 NOTE — CONSULT NOTE ADULT - ASSESSMENT
66 yo M with PMH of NYHA Class 4 HFrEF secondary to NICM, HTN, PAF on Coumadin initially presented for AICD firing and management of decompensated heart failure s/p LVAD on 06/12.  GI consulted for Dark stools, Guaiac positive and drop in Hb requiring 2 units PRBC.  - Keep NPO, start on Protonix infusion,  - Monitor CBC Q8H and transfuse as needed.  - Will schedule for EGD and reassess in the morning prior to EGD.

## 2017-06-27 NOTE — PROGRESS NOTE ADULT - SUBJECTIVE AND OBJECTIVE BOX
Interval History:    Feeling well. Drop in Hgb but no overt bleeding. S/p 1unit pRBC.    Medications:  sodium chloride 0.9%. 1000 milliLiter(s) IV Continuous <Continuous>  docusate sodium 100 milliGRAM(s) Oral three times a day  dextrose 5%. 1000 milliLiter(s) IV Continuous <Continuous>  dextrose 50% Injectable 25 Gram(s) IV Push once  aspirin  chewable 81 milliGRAM(s) Oral daily  amiodarone    Tablet 200 milliGRAM(s) Oral daily  insulin lispro (HumaLOG) corrective regimen sliding scale   SubCutaneous three times a day before meals  insulin lispro (HumaLOG) corrective regimen sliding scale   SubCutaneous at bedtime  furosemide Infusion 10 mG/Hr IV Continuous <Continuous>  colchicine 0.6 milliGRAM(s) Oral daily  QUEtiapine 50 milliGRAM(s) Oral at bedtime  carvedilol 3.125 milliGRAM(s) Oral every 12 hours  pantoprazole    Tablet 40 milliGRAM(s) Oral before breakfast  famotidine    Tablet 20 milliGRAM(s) Oral at bedtime    Vitals:  T(C): 33.3 (17 @ 08:00), Max: 36.8 (17 @ 20:00)  HR: 94 (17 @ 08:00) (90 - 115)  ABP: 91/75 (17 @ 08:00) (68/64 - 100/85)  ABP(mean): 88 (17 @ 08:00) (66 - 90)  RR: 20 (17 @ 08:00) (13 - 20)  SpO2: 93% (17 @ 08:00) (93% - 100%)      Daily     Daily Weight in k.5 (2017 06:30)        I&O's Summary    2017 07:  -  2017 07:00  --------------------------------------------------------  IN: 1880 mL / OUT: 2625 mL / NET: -745 mL    2017 07:  -  2017 12:10  --------------------------------------------------------  IN: 476 mL / OUT: 600 mL / NET: -124 mL        Physical Exam:  Appearance: No Acute Distress  HEENT: mildly elevated JVD  Cardiovascular: Audible S1 S2, +VAD hum  Respiratory: Clear to auscultation bilaterally  Gastrointestinal: Soft, Non-tender	  Skin: No cyanosis	  Neurologic: Non-focal  Extremities: 1+ LE edema  Psychiatry: A & O x 3, Mood & affect appropriate    LVAD Interrogation: HM2  Pump Flow: 4  Pump Speed: 9200  Pulse Index: 4.4  Pump Power: 4.9  VAD Events: No significant events  Driveline evaluation: soft NT  Programming Changes: No changes made    Labs:                        7.1    16.3  )-----------( 430      ( 2017 01:16 )             22.4         134<L>  |  99  |  53<H>  ----------------------------<  94  3.6   |  22  |  1.64<H>    Ca    8.2<L>      2017 01:16    TPro  5.6<L>  /  Alb  2.4<L>  /  TBili  2.0<H>  /  DBili  x   /  AST  53<H>  /  ALT  38  /  AlkPhos  117      PT/INR - ( 2017 14:31 )   PT: 21.0 sec;   INR: 1.90 ratio         PTT - ( 2017 02:30 )  PTT:34.6 sec      Lactate Dehydrogenase, Serum: 312 U/L ( @ 02:30)  Lactate Dehydrogenase, Serum: 364 U/L ( @ 01:46) Interval History:    Feeling well. Drop in Hgb but no overt bleeding. S/p 1unit pRBC.    Medications:  sodium chloride 0.9%. 1000 milliLiter(s) IV Continuous <Continuous>  docusate sodium 100 milliGRAM(s) Oral three times a day  dextrose 5%. 1000 milliLiter(s) IV Continuous <Continuous>  dextrose 50% Injectable 25 Gram(s) IV Push once  aspirin  chewable 81 milliGRAM(s) Oral daily  amiodarone    Tablet 200 milliGRAM(s) Oral daily  insulin lispro (HumaLOG) corrective regimen sliding scale   SubCutaneous three times a day before meals  insulin lispro (HumaLOG) corrective regimen sliding scale   SubCutaneous at bedtime  furosemide Infusion 10 mG/Hr IV Continuous <Continuous>  colchicine 0.6 milliGRAM(s) Oral daily  QUEtiapine 50 milliGRAM(s) Oral at bedtime  carvedilol 3.125 milliGRAM(s) Oral every 12 hours  pantoprazole    Tablet 40 milliGRAM(s) Oral before breakfast  famotidine    Tablet 20 milliGRAM(s) Oral at bedtime    Vitals:  T(C): 33.3 (17 @ 08:00), Max: 36.8 (17 @ 20:00)  HR: 94 (17 @ 08:00) (90 - 115)  ABP: 91/75 (17 @ 08:00) (68/64 - 100/85)  ABP(mean): 88 (17 @ 08:00) (66 - 90)  RR: 20 (17 @ 08:00) (13 - 20)  SpO2: 93% (17 @ 08:00) (93% - 100%)       Daily Weight in k.5 (2017 06:30)      I&O's Summary    2017 07:  -  2017 07:00  --------------------------------------------------------  IN: 1880 mL / OUT: 2625 mL / NET: -745 mL    2017 07:  -  2017 12:10  --------------------------------------------------------  IN: 476 mL / OUT: 600 mL / NET: -124 mL        Physical Exam:  Appearance: No Acute Distress  HEENT: mildly elevated JVD  Cardiovascular: Audible S1 S2, +VAD hum  Respiratory: Clear to auscultation bilaterally  Gastrointestinal: Soft, Non-tender	  Skin: No cyanosis	  Neurologic: Non-focal  Extremities: 1+ LE edema  Psychiatry: A & O x 3, Mood & affect appropriate    LVAD Interrogation: HM2  Pump Flow: 4  Pump Speed: 9200  Pulse Index: 4.4  Pump Power: 4.9  VAD Events: No significant events  Driveline exit site evaluation: soft, NT, clean, dry and intact  Programming Changes: No changes made    Labs:                        7.1    16.3  )-----------( 430      ( 2017 01:16 )             22.4         134<L>  |  99  |  53<H>  ----------------------------<  94  3.6   |  22  |  1.64<H>    Ca    8.2<L>      2017 01:16    TPro  5.6<L>  /  Alb  2.4<L>  /  TBili  2.0<H>  /  DBili  x   /  AST  53<H>  /  ALT  38  /  AlkPhos  117      PT/INR - ( 2017 14:31 )   PT: 21.0 sec;   INR: 1.90 ratio         PTT - ( 2017 02:30 )  PTT:34.6 sec      Lactate Dehydrogenase, Serum: 312 U/L ( @ 02:30)  Lactate Dehydrogenase, Serum: 364 U/L ( @ 01:46)

## 2017-06-27 NOTE — PROGRESS NOTE ADULT - ASSESSMENT
67 year old man with ACC/AHA stage D congestive heart failure with severely reduced LV systolic function due to a nonischemic dilated cardiomyopathy s/p HM2 LVAD on 6/12 with post-operative course complicated by hemodynamically but self-terminating ventricular tachycardia and acute renal failure, which is improving. He continues to improve functionally now off of milrinone, but is having persistent SVT, likely atrial tachycardia. His hematocrit dropped again and again required 1 unit of pRBCs but is still without overt bleeding. His persistent leukocytosis is slowly improving. His right hand is swollen and tender but without erythema. 67 year old man with ACC/AHA stage D congestive heart failure with severely reduced LV systolic function due to a nonischemic dilated cardiomyopathy s/p HM2 LVAD on 6/12 with post-operative course complicated by self-terminating ventricular tachycardia and acute renal failure, which is improved and stable. He continues to improve functionally off of milrinone. His hematocrit dropped again and again required 1 unit of pRBCs but is still without overt bleeding. His persistent leukocytosis is slowly improving. His right hand is swollen and tender but without erythema.

## 2017-06-27 NOTE — PROGRESS NOTE ADULT - ATTENDING COMMENTS
Clinically stable with improved volume status but persistent lower extremity edema. His hematocrit continues to be low and he received another unit of PRBCs today. His stool hemoccult was positive. We will hold aspirin and coumadin and consult GI for possible endoscopy.

## 2017-06-27 NOTE — CONSULT NOTE ADULT - SUBJECTIVE AND OBJECTIVE BOX
Chief Complaint:  Patient is a 67y old  Male who presents with a chief complaint of Transfer from [facility] for management of ADHF/ LVAD eval (2017 00:32)      HPI:   66 yo M with PMH of NYHA Class 4 HFrEF secondary to NICM, HTN, PAF on Coumadin initially presented for AICD firing and management of decompensated heart failure s/p LVAD on . hospital course complicated by VT and NORMAN.  GI consulted for Guaiac positive stool. As per HS, his Hb has been dropping for the last 3 days and he got 2 units PRBC, one on consecutive days. RN reported that he had one dark BM today that looked like melena. Pt also had 2 BM yesterday but no documentation on color and consistency of stools.  No N/V, abdominal pain, BRBPR reported by HS. Pt currently on Lasix drip and has been maintaining a MAP with Inotropes.  Pt had an EGD done in April that revealed 3 cm ulcer in the lesser curvature of stomach that did not require any intervention and biopsies negative for malignancy or H Pylori.    Allergies:  No Known Allergies      Home Medications:    Hospital Medications:  sodium chloride 0.9%. 1000 milliLiter(s) IV Continuous <Continuous>  docusate sodium 100 milliGRAM(s) Oral three times a day  dextrose 5%. 1000 milliLiter(s) IV Continuous <Continuous>  dextrose 50% Injectable 25 Gram(s) IV Push once  aspirin  chewable 81 milliGRAM(s) Oral daily  amiodarone    Tablet 200 milliGRAM(s) Oral daily  insulin lispro (HumaLOG) corrective regimen sliding scale   SubCutaneous three times a day before meals  insulin lispro (HumaLOG) corrective regimen sliding scale   SubCutaneous at bedtime  furosemide Infusion 10 mG/Hr IV Continuous <Continuous>  colchicine 0.6 milliGRAM(s) Oral daily  QUEtiapine 50 milliGRAM(s) Oral at bedtime  carvedilol 3.125 milliGRAM(s) Oral every 12 hours  pantoprazole    Tablet 40 milliGRAM(s) Oral before breakfast  famotidine    Tablet 20 milliGRAM(s) Oral at bedtime      PMHX/PSHX:  H/O prior ablation treatment  Ventricular fibrillation  PAF (paroxysmal atrial fibrillation)  Non-Ischemic Cardiomyopathy  SVT (Supraventricular Tachycardia)  HTN  CHF (Congestive Heart Failure)  Status post left hip replacement  Hypertension  Hypertension  History of Prior Ablation Treatment  AICD (Automatic Cardioverter/Defibrillator) Present      Family history:  No pertinent family history in first degree relatives    ROS:     General:  No wt loss, fevers, chills, night sweats.  CV:  No pain, palpitations, hypo/hypertension  Resp:  No dyspnea, cough, tachypnea, wheezing  GI:  See HPI  :  No pain, bleeding, incontinence, nocturia  Heme:  No petechiae, ecchymosis, easy bruisability        PHYSICAL EXAM:     GENERAL:  Appears stated age, well-groomed, well-nourished, no distress  CHEST:  Full & symmetric excursion, no increased effort, breath sounds clear  HEART:  Regular rhythm, S1, S2, no added sounds.  ABDOMEN:  Soft, non-tender, non-distended, normoactive bowel sounds,  s/p placement of LVAD   RECTAL EXAM : Dark green stool.  SKIN:  No rash/erythema/ecchymoses/petechiae/wounds/abscess/warm/dry  NEURO:  Alert, oriented, answered all questions     Vital Signs:  Vital Signs Last 24 Hrs  T(C): 36.4 (2017 13:26), Max: 36.8 (2017 20:00)  T(F): 97.6 (2017 13:26), Max: 98.3 (2017 20:00)  HR: 92 (2017 16:40) (86 - 100)  BP: --  BP(mean): 90 (2017 16:40) (70 - 90)  RR: 18 (2017 13:26) (18 - 20)  SpO2: 100% (2017 16:40) (93% - 100%)  Daily     Daily Weight in k.5 (2017 06:30)    LABS:                        9.4    12.5  )-----------( 492      ( 2017 13:25 )             28.3     06-    136  |  98  |  53<H>  ----------------------------<  87  3.8   |  21<L>  |  1.81<H>    Ca    8.6      2017 14:20    TPro  6.4  /  Alb  2.6<L>  /  TBili  2.1<H>  /  DBili  x   /  AST  73<H>  /  ALT  46<H>  /  AlkPhos  133<H>      LIVER FUNCTIONS - ( 2017 14:20 )  Alb: 2.6 g/dL / Pro: 6.4 g/dL / ALK PHOS: 133 U/L / ALT: 46 U/L RC / AST: 73 U/L / GGT: x           PT/INR - ( 2017 13:25 )   PT: 22.7 sec;   INR: 2.05 ratio         PTT - ( 2017 02:30 )  PTT:34.6 sec Chief Complaint:  Patient is a 67y old  Male who presents with a chief complaint of Transfer from [facility] for management of ADHF/ LVAD eval (2017 00:32)      HPI:   66 yo M with PMH of NYHA Class 4 HFrEF secondary to NICM, HTN, PAF on Coumadin initially presented for AICD firing and management of decompensated heart failure s/p LVAD on . hospital course complicated by VT and NORMAN.  GI consulted for Guaiac positive stool. As per HS, his Hb has been dropping for the last 3 days and he got 2 units PRBC, one on consecutive days. RN reported that he had one dark BM today that looked like melena. Pt also had 2 BM yesterday but no documentation on color and consistency of stools.  No N/V, abdominal pain, BRBPR reported by HS. Pt currently on Lasix drip and has been maintaining a MAP with Inotropes.  Pt had an EGD done in April that revealed 3 cm ulcer in the lesser curvature of stomach that did not require any intervention and biopsies negative for malignancy or H Pylori.  Pt states that he never had a colonoscopy done in the past.    Allergies:  No Known Allergies      Home Medications:    Hospital Medications:  sodium chloride 0.9%. 1000 milliLiter(s) IV Continuous <Continuous>  docusate sodium 100 milliGRAM(s) Oral three times a day  dextrose 5%. 1000 milliLiter(s) IV Continuous <Continuous>  dextrose 50% Injectable 25 Gram(s) IV Push once  aspirin  chewable 81 milliGRAM(s) Oral daily  amiodarone    Tablet 200 milliGRAM(s) Oral daily  insulin lispro (HumaLOG) corrective regimen sliding scale   SubCutaneous three times a day before meals  insulin lispro (HumaLOG) corrective regimen sliding scale   SubCutaneous at bedtime  furosemide Infusion 10 mG/Hr IV Continuous <Continuous>  colchicine 0.6 milliGRAM(s) Oral daily  QUEtiapine 50 milliGRAM(s) Oral at bedtime  carvedilol 3.125 milliGRAM(s) Oral every 12 hours  pantoprazole    Tablet 40 milliGRAM(s) Oral before breakfast  famotidine    Tablet 20 milliGRAM(s) Oral at bedtime      PMHX/PSHX:  H/O prior ablation treatment  Ventricular fibrillation  PAF (paroxysmal atrial fibrillation)  Non-Ischemic Cardiomyopathy  SVT (Supraventricular Tachycardia)  HTN  CHF (Congestive Heart Failure)  Status post left hip replacement  Hypertension  Hypertension  History of Prior Ablation Treatment  AICD (Automatic Cardioverter/Defibrillator) Present      Family history:  No pertinent family history in first degree relatives    ROS:     General:  No wt loss, fevers, chills, night sweats.  CV:  No pain, palpitations, hypo/hypertension  Resp:  No dyspnea, cough, tachypnea, wheezing  GI:  See HPI  :  No pain, bleeding, incontinence, nocturia  Heme:  No petechiae, ecchymosis, easy bruisability        PHYSICAL EXAM:     GENERAL:  Appears stated age, well-groomed, well-nourished, no distress  CHEST:  Full & symmetric excursion, no increased effort, breath sounds clear  HEART:  Regular rhythm, S1, S2, no added sounds.  ABDOMEN:  Soft, non-tender, non-distended, normoactive bowel sounds,  s/p placement of LVAD   RECTAL EXAM : Dark green stool.  SKIN:  No rash/erythema/ecchymoses/petechiae/wounds/abscess/warm/dry  NEURO:  Alert, oriented, answered all questions     Vital Signs:  Vital Signs Last 24 Hrs  T(C): 36.4 (2017 13:26), Max: 36.8 (2017 20:00)  T(F): 97.6 (2017 13:26), Max: 98.3 (2017 20:00)  HR: 92 (2017 16:40) (86 - 100)  BP: --  BP(mean): 90 (2017 16:40) (70 - 90)  RR: 18 (2017 13:26) (18 - 20)  SpO2: 100% (2017 16:40) (93% - 100%)  Daily     Daily Weight in k.5 (2017 06:30)    LABS:                        9.4    12.5  )-----------( 492      ( 2017 13:25 )             28.3     -    136  |  98  |  53<H>  ----------------------------<  87  3.8   |  21<L>  |  1.81<H>    Ca    8.6      2017 14:20    TPro  6.4  /  Alb  2.6<L>  /  TBili  2.1<H>  /  DBili  x   /  AST  73<H>  /  ALT  46<H>  /  AlkPhos  133<H>  06-    LIVER FUNCTIONS - ( 2017 14:20 )  Alb: 2.6 g/dL / Pro: 6.4 g/dL / ALK PHOS: 133 U/L / ALT: 46 U/L RC / AST: 73 U/L / GGT: x           PT/INR - ( 2017 13:25 )   PT: 22.7 sec;   INR: 2.05 ratio         PTT - ( 2017 02:30 )  PTT:34.6 sec

## 2017-06-27 NOTE — PROGRESS NOTE ADULT - ATTENDING COMMENTS
NORMAN on CKD post LVAD: Likely in the setting of cardiogenic shock   Renal function stable with excellent urine output despite diaz removal   Diuresis per CTICU/heart failure   Proteinuria of 1.7 gms noted. Recommend check SPEP/SIFE/Immunoglobulin light chains    Anemia: pRBC per primary team     Godwin Lucia MD NORMAN on CKD post LVAD: Likely in the setting of cardiogenic shock   Renal function improving  with excellent urine output on lasix drip   Proteinuria of 1.7 gms noted. Recommend check SPEP/SIFE/Immunoglobulin light chains    Anemia: pRBC per primary team. On PPI and H2 blocker     Godwin Lucia MD

## 2017-06-27 NOTE — PROGRESS NOTE ADULT - PROBLEM SELECTOR PLAN 3
No programming changes.  Continue aspirin 81 mg daily.  Coumadin dosing tonight with 4 mg given down trending INR and no overt bleeding and no pericardial effusion on TTE, goal INR 2-2.5  LDH within acceptable range, continues to improve Will hold aspirin and coumadin  Consult GI to evaluate for EGD

## 2017-06-27 NOTE — CONSULT NOTE ADULT - ATTENDING COMMENTS
67yoM with CHF found to have an elevated INR and PTT on xarelto. Reports he has never taken coumadin. reports there is a nurse who comes weekly and sets up his medication for him.  Check mixing study. Vitamin K trial. Received Kcentra, follow up INR after receipt.
Impression:    #1.  GI Bleed/Melena, history of gastric ulcer in April 2017.    #2.  Blood loss Anemia requiring 2 unity pRBC    #3.  Decompensated nonischemic severe heart failure, s/p LVAD and AICD, recent aspirin    #4.  Atrial fibrillation on Coumadin, held per cardiology.    Plan:    #1.  Follow CBC/INR    #2.  Proton pump inhibitor/drip    #3.  Transfuse as necessary    #4.  Will evaluate for EGD based on clinical course and lab tests.
Patient seen and examined  with Fellow. Conservative management at this time in this high risk patient. No gross gi bleeding at this time.
The patient was reported to decline in neuro status with involuntary movements that seemed to have some rhymicity.  On exam patient is sedated however there is clonus in bilateral achilles.  Findings in setting of placement of LVAD suggest neuro event, at this time we will follow up EEG and would recommend CT scan of the brain and c-spine.
NORMAN- cardiorenal on Lasix, now euvolumic-consider decreasing Lasix drip    mild urinary retention-Bladder scan with 214 this am; repeat bladder scans and consider diaz if elevated    Met acidosis- monitor Hco3 trend daily

## 2017-06-27 NOTE — PROGRESS NOTE ADULT - PROBLEM SELECTOR PLAN 1
Continue Lasix drip at 10mg/hr as still significantly volume overloaded  MAP goal 70-80; predominantly 66-88 for today

## 2017-06-27 NOTE — PROGRESS NOTE ADULT - SUBJECTIVE AND OBJECTIVE BOX
[facility] DIVISION OF KIDNEY DISEASES AND HYPERTENSION -- [phone]   FOLLOW UP NOTE  --------------------------------------------------------------------------------  Chief Complaint: Heart failure      24hour events/Subjective: Pt seen and examined. Pt transferred to floor. States he feels good today.         PAST HISTORY  --------------------------------------------------------------------------------  No significant changes to PMH, PSH, FHx, SHx, unless otherwise noted    ALLERGIES & MEDICATIONS  --------------------------------------------------------------------------------  Allergies    No Known Allergies    Intolerances      Standing Inpatient Medications  sodium chloride 0.9%. 1000 milliLiter(s) IV Continuous <Continuous>  docusate sodium 100 milliGRAM(s) Oral three times a day  dextrose 5%. 1000 milliLiter(s) IV Continuous <Continuous>  dextrose 50% Injectable 25 Gram(s) IV Push once  aspirin  chewable 81 milliGRAM(s) Oral daily  amiodarone    Tablet 200 milliGRAM(s) Oral daily  insulin lispro (HumaLOG) corrective regimen sliding scale   SubCutaneous three times a day before meals  insulin lispro (HumaLOG) corrective regimen sliding scale   SubCutaneous at bedtime  famotidine    Tablet 20 milliGRAM(s) Oral daily  furosemide Infusion 10 mG/Hr IV Continuous <Continuous>  colchicine 0.6 milliGRAM(s) Oral daily  QUEtiapine 50 milliGRAM(s) Oral at bedtime  carvedilol 3.125 milliGRAM(s) Oral every 12 hours    PRN Inpatient Medications      REVIEW OF SYSTEMS  --------------------------------------------------------------------------------  As above         VITALS/PHYSICAL EXAM  --------------------------------------------------------------------------------  T(C): 33.3 (06-27-17 @ 08:00), Max: 36.8 (06-26-17 @ 12:00)  HR: 94 (06-27-17 @ 08:00) (90 - 115)  BP: --  RR: 20 (06-27-17 @ 08:00) (13 - 31)  SpO2: 93% (06-27-17 @ 08:00) (93% - 100%)  Wt(kg): --        06-26-17 @ 07:01  -  06-27-17 @ 07:00  --------------------------------------------------------  IN: 1880 mL / OUT: 2625 mL / NET: -745 mL    06-27-17 @ 07:01  -  06-27-17 @ 09:06  --------------------------------------------------------  IN: 0 mL / OUT: 300 mL / NET: -300 mL      Physical Exam:  	Gen: NAD, sitting up in chair   	HEENT: MMM  	Pulm: CTA B/L  	CV: S1S2  	Abd: Soft, +BS  	Ext: improving  LE edema B/L                      Neuro: Awake   	Skin: Warm and Dry   	Other ; no emily    LABS/STUDIES  --------------------------------------------------------------------------------              7.1    16.3  >-----------<  430      [06-27-17 @ 01:16]              22.4     134  |  99  |  53  ----------------------------<  94      [06-27-17 @ 01:16]  3.6   |  22  |  1.64        Ca     8.2     [06-27-17 @ 01:16]    TPro  5.6  /  Alb  2.4  /  TBili  2.0  /  DBili  x   /  AST  53  /  ALT  38  /  AlkPhos  117  [06-26-17 @ 02:30]    PT/INR: PT 21.0 , INR 1.90       [06-26-17 @ 14:31]  PTT: 34.6       [06-26-17 @ 02:30]          [06-26-17 @ 02:30]    Creatinine Trend:  SCr 1.64 [06-27 @ 01:16]  SCr 1.84 [06-26 @ 02:30]  SCr 1.79 [06-25 @ 01:46]  SCr 1.73 [06-24 @ 00:27]  SCr 1.82 [06-23 @ 03:03]    Urinalysis - [06-21-17 @ 18:17]      Color Yellow / Appearance  / SG 1.010 / pH 7.0      Gluc Negative / Ketone Negative  / Bili Negative / Urobili 4       Blood Small / Protein 30 / Leuk Est Moderate / Nitrite Negative      RBC 3-5 / WBC 6-10 / Hyaline  / Gran  / Sq Epi  / Non Sq Epi OCC / Bacteria Few    Urine Creatinine 32      [06-21-17 @ 22:07]  Urine Protein 53      [06-21-17 @ 22:07]  Urine Sodium 42      [06-21-17 @ 18:17]  Urine Chloride 46      [06-21-17 @ 18:17]    Iron 20, TIBC 352, %sat 6      [06-08-17 @ 07:14]  Ferritin 121.0      [06-09-17 @ 19:08]  HbA1c 5.5      [06-07-17 @ 06:14]  TSH 2.33      [06-14-17 @ 14:51]  Lipid: chol 62, TG 33, HDL 17, LDL 38      [06-07-17 @ 06:14]

## 2017-06-27 NOTE — PROGRESS NOTE ADULT - SUBJECTIVE AND OBJECTIVE BOX
Im feeling just finne    VITAL SIGNS    Telemetry:   NSR     Vital Signs Last 24 Hrs  T(C): 33.3 (17 @ 08:00), Max: 36.8 (17 @ 12:00)  T(F): 92 (17 @ 08:00), Max: 98.3 (17 @ 20:00)  HR: 94 (17 @ 08:00) (90 - 115)  BP: --  RR: 20 (17 @ 08:00) (13 - 31)  SpO2: 93% (17 @ 08:00) (93% - 100%)             I&O's Detail    2017 07:  -  2017 07:00  --------------------------------------------------------  IN:    furosemide Infusion: 110 mL    IV PiggyBack: 100 mL    Oral Fluid: 1120 mL    Packed Red Blood Cells: 550 mL  Total IN: 1880 mL    OUT:    Voided: 2625 mL  Total OUT: 2625 mL    Total NET: -745 mL      2017 07:  -  2017 10:12  --------------------------------------------------------  IN:    Oral Fluid: 476 mL  Total IN: 476 mL    OUT:    Voided: 300 mL  Total OUT: 300 mL    Total NET: 176 mL             @ 07:  -   @ 07:00  --------------------------------------------------------  IN: 1880 mL / OUT: 2625 mL / NET: -745 mL     @ 07:  -   @ 10:12  --------------------------------------------------------  IN: 476 mL / OUT: 300 mL / NET: 176 mL          Daily     Daily Weight in k.5 (2017 06:30)  Admit Wt: Drug Dosing Weight  Height (cm): 172.72 (2017 17:13)  Weight (kg): 75 (2017 15:17)  BMI (kg/m2): 25.1 (2017 17:13)  BSA (m2): 1.89 (2017 17:13)      CAPILLARY BLOOD GLUCOSE  81 (2017 07:20)  109 (2017 21:58)  92 (2017 16:07)              Drains:   no      MS         [  ] Drainage:                 L Pleural  [  ]  Drainage:                R Pleural  [  ]  Drainage:    Pacing Wires      no       [  ]   Settings:                                  Isolated  [  ]    Coumadin    [x ] YES          [  ]      NO         Reason:     LVAD                      MEDICATIONS  sodium chloride 0.9%. 1000 milliLiter(s) IV Continuous <Continuous>  docusate sodium 100 milliGRAM(s) Oral three times a day  dextrose 5%. 1000 milliLiter(s) IV Continuous <Continuous>  dextrose 50% Injectable 25 Gram(s) IV Push once  aspirin  chewable 81 milliGRAM(s) Oral daily  amiodarone    Tablet 200 milliGRAM(s) Oral daily  insulin lispro (HumaLOG) corrective regimen sliding scale   SubCutaneous three times a day before meals  insulin lispro (HumaLOG) corrective regimen sliding scale   SubCutaneous at bedtime  famotidine    Tablet 20 milliGRAM(s) Oral daily  furosemide Infusion 10 mG/Hr IV Continuous <Continuous>  colchicine 0.6 milliGRAM(s) Oral daily  QUEtiapine 50 milliGRAM(s) Oral at bedtime  carvedilol 3.125 milliGRAM(s) Oral every 12 hours      PHYSICAL EXAM        Neurology: alert and oriented x 3, nonfocal, no gross deficits    CV : S1 S2 , RRR     Sternal Wound :  CDI , Stable    Lungs: LLL diminished    Abdomen: soft, nontender, nondistended, positive bowel sounds, last bowel movement     :         voiding    Extremities:    +   edema, negative calve tenderness,                 LABS      134<L>  |  99  |  53<H>  ----------------------------<  94  3.6   |  22  |  1.64<H>    Ca    8.2<L>      2017 01:16    TPro  5.6<L>  /  Alb  2.4<L>  /  TBili  2.0<H>  /  DBili  x   /  AST  53<H>  /  ALT  38  /  AlkPhos  117  06-26                                 7.1    16.3  )-----------( 430      ( 2017 01:16 )             22.4          PT/INR - ( 2017 14:31 )   PT: 21.0 sec;   INR: 1.90 ratio         PTT - ( 2017 02:30 )  PTT:34.6 sec           CXR:       PAST MEDICAL & SURGICAL HISTORY:  Ventricular fibrillation: s/p AICD  PAF (paroxysmal atrial fibrillation): on xarelto  Non-Ischemic Cardiomyopathy  SVT (Supraventricular Tachycardia)  HTN  CHF (Congestive Heart Failure)  Status post left hip replacement  History of Prior Ablation Treatment: for afib  AICD (Automatic Cardioverter/Defibrillator) Present: [employer] with 1 [employer] lead09- explanted and replaced with [employer] 2 leads on 09             Physical Therapy Rec:   Home  [  ]   Home w/ PT  [  ]  Rehab  [  ]    Discussed with Cardiothoracic Team at AM rounds.

## 2017-06-28 DIAGNOSIS — K92.2 GASTROINTESTINAL HEMORRHAGE, UNSPECIFIED: ICD-10-CM

## 2017-06-28 LAB
ANION GAP SERPL CALC-SCNC: 14 MMOL/L — SIGNIFICANT CHANGE UP (ref 5–17)
BUN SERPL-MCNC: 60 MG/DL — HIGH (ref 7–23)
CALCIUM SERPL-MCNC: 8.5 MG/DL — SIGNIFICANT CHANGE UP (ref 8.4–10.5)
CHLORIDE SERPL-SCNC: 98 MMOL/L — SIGNIFICANT CHANGE UP (ref 96–108)
CO2 SERPL-SCNC: 24 MMOL/L — SIGNIFICANT CHANGE UP (ref 22–31)
CREAT SERPL-MCNC: 1.72 MG/DL — HIGH (ref 0.5–1.3)
GLUCOSE SERPL-MCNC: 85 MG/DL — SIGNIFICANT CHANGE UP (ref 70–99)
HAPTOGLOB SERPL-MCNC: 193 MG/DL — SIGNIFICANT CHANGE UP (ref 34–200)
HCT VFR BLD CALC: 25 % — LOW (ref 39–50)
HGB BLD-MCNC: 8.2 G/DL — LOW (ref 13–17)
INR BLD: 2.42 RATIO — HIGH (ref 0.88–1.16)
KAPPA LC SER QL IFE: 16.3 MG/DL — HIGH (ref 0.33–1.94)
KAPPA/LAMBDA FREE LIGHT CHAIN RATIO, SERUM: 1.47 RATIO — SIGNIFICANT CHANGE UP (ref 0.26–1.65)
LAMBDA LC SER QL IFE: 11.1 MG/DL — HIGH (ref 0.57–2.63)
MCHC RBC-ENTMCNC: 29.9 PG — SIGNIFICANT CHANGE UP (ref 27–34)
MCHC RBC-ENTMCNC: 32.7 GM/DL — SIGNIFICANT CHANGE UP (ref 32–36)
MCV RBC AUTO: 91.5 FL — SIGNIFICANT CHANGE UP (ref 80–100)
PLATELET # BLD AUTO: 466 K/UL — HIGH (ref 150–400)
POTASSIUM SERPL-MCNC: 3.4 MMOL/L — LOW (ref 3.5–5.3)
POTASSIUM SERPL-SCNC: 3.4 MMOL/L — LOW (ref 3.5–5.3)
PROT SERPL-MCNC: 5.9 G/DL — LOW (ref 6–8.3)
PROT SERPL-MCNC: 5.9 G/DL — LOW (ref 6–8.3)
PROTHROM AB SERPL-ACNC: 26.9 SEC — HIGH (ref 9.8–12.7)
RBC # BLD: 2.74 M/UL — LOW (ref 4.2–5.8)
RBC # FLD: 17.9 % — HIGH (ref 10.3–14.5)
SODIUM SERPL-SCNC: 136 MMOL/L — SIGNIFICANT CHANGE UP (ref 135–145)
WBC # BLD: 12.7 K/UL — HIGH (ref 3.8–10.5)
WBC # FLD AUTO: 12.7 K/UL — HIGH (ref 3.8–10.5)

## 2017-06-28 PROCEDURE — 99233 SBSQ HOSP IP/OBS HIGH 50: CPT | Mod: 25,GC

## 2017-06-28 PROCEDURE — 99232 SBSQ HOSP IP/OBS MODERATE 35: CPT | Mod: GC

## 2017-06-28 PROCEDURE — 99233 SBSQ HOSP IP/OBS HIGH 50: CPT | Mod: GC

## 2017-06-28 PROCEDURE — 93750 INTERROGATION VAD IN PERSON: CPT

## 2017-06-28 RX ORDER — POTASSIUM CHLORIDE 20 MEQ
20 PACKET (EA) ORAL
Qty: 0 | Refills: 0 | Status: COMPLETED | OUTPATIENT
Start: 2017-06-28 | End: 2017-06-28

## 2017-06-28 RX ADMIN — Medication 20 MILLIEQUIVALENT(S): at 07:57

## 2017-06-28 RX ADMIN — Medication 0.6 MILLIGRAM(S): at 12:58

## 2017-06-28 RX ADMIN — CARVEDILOL PHOSPHATE 3.12 MILLIGRAM(S): 80 CAPSULE, EXTENDED RELEASE ORAL at 05:36

## 2017-06-28 RX ADMIN — AMIODARONE HYDROCHLORIDE 200 MILLIGRAM(S): 400 TABLET ORAL at 05:36

## 2017-06-28 RX ADMIN — Medication 20 MILLIEQUIVALENT(S): at 05:36

## 2017-06-28 RX ADMIN — Medication 20 MILLIEQUIVALENT(S): at 09:10

## 2017-06-28 RX ADMIN — QUETIAPINE FUMARATE 50 MILLIGRAM(S): 200 TABLET, FILM COATED ORAL at 22:35

## 2017-06-28 RX ADMIN — CARVEDILOL PHOSPHATE 3.12 MILLIGRAM(S): 80 CAPSULE, EXTENDED RELEASE ORAL at 17:03

## 2017-06-28 RX ADMIN — Medication 5 MG/HR: at 07:57

## 2017-06-28 RX ADMIN — PANTOPRAZOLE SODIUM 10 MG/HR: 20 TABLET, DELAYED RELEASE ORAL at 07:57

## 2017-06-28 NOTE — PROGRESS NOTE ADULT - ATTENDING COMMENTS
NORMAN on CKD post LVAD: Likely in the setting of cardiogenic shock   Renal function improving  with excellent urine output on lasix drip   Proteinuria of 1.7 gms noted. Recommend check SPEP/SIFE/Immunoglobulin light chains    Anemia: pRBC per primary team. On PPI and H2 blocker     Godwin Lucia MD NORMAN on CKD post LVAD: Likely in the setting of cardiogenic shock   Renal function stable with excellent urine output on lasix drip. This may be his new baseline  Proteinuria of 1.7 gms noted. Await the  SPEP/SIFE. Free light chain ratio is OK    Anemia: +stool guaic. On  protonix drip  Possibly for EGD today     Godwin Lucia MD

## 2017-06-28 NOTE — PROGRESS NOTE ADULT - PROBLEM SELECTOR PLAN 2
No programming changes.  Continue to hold aspirin and coumadin while evaluating GI bleed  LDH within acceptable range, continues to improve

## 2017-06-28 NOTE — PROGRESS NOTE ADULT - ASSESSMENT
GI Bleed ( Melena ) : Resolved for now.  Will hold off on EGD today.  No evidence of rebleed.  Start on clears today, Continue IV PPI infusion.  Monitor CBC and transfuse as needed.

## 2017-06-28 NOTE — PROGRESS NOTE ADULT - PROBLEM SELECTOR PLAN 1
hemodynamically mediated in setting of decompensated heart failure +/- urinary retention component and hypotension.  Pt now with mild uptrending of Scr likely  in setting of GI bleed/anemia.  Pt s/p pRBC on 6/27. Pt with  good urine output.  Monitor BMP  Strict I/O, avoid nephrotoxics, NSAIDs, RCA. Renal dose of medications.

## 2017-06-28 NOTE — PROGRESS NOTE ADULT - SUBJECTIVE AND OBJECTIVE BOX
Interval History:    Dark stools o/n, slow decrease in H&H and FOBT positive. No complaints this AM. Left hand swelling improved.    Medications:    docusate sodium 100 milliGRAM(s) Oral three times a day  amiodarone    Tablet 200 milliGRAM(s) Oral daily  insulin lispro (HumaLOG) corrective regimen sliding scale   SubCutaneous three times a day before meals  insulin lispro (HumaLOG) corrective regimen sliding scale   SubCutaneous at bedtime  furosemide Infusion 10 mG/Hr IV Continuous <Continuous>  colchicine 0.6 milliGRAM(s) Oral daily  QUEtiapine 50 milliGRAM(s) Oral at bedtime  carvedilol 3.125 milliGRAM(s) Oral every 12 hours  pantoprazole Infusion 8 mG/Hr IV Continuous <Continuous>    Vitals:  T(C): 36.7 (17 @ 08:05), Max: 37 (17 @ 20:00)  HR: 87 (17 @ 08:05) (86 - 96)  BP(mean): 80 (17 @ 08:05) (66 - 92)  RR: 18 (17 @ 08:05) (16 - 18)  SpO2: 100% (17 @ 08:05) (97% - 100%)    Daily Weight in k.4 (2017 09:00)      I&O's Summary    2017 07:  -  2017 07:00  --------------------------------------------------------  IN: 1006 mL / OUT: 2950 mL / NET: -1944 mL    2017 07:  -  2017 10:56  --------------------------------------------------------  IN: 45 mL / OUT: 400 mL / NET: -355 mL        Physical Exam:  Appearance: No Acute Distress  HEENT: JVP to lower 1/3 of neck  Cardiovascular: Audible S1 S2, + LVAD hum  Respiratory: Coarse breath sounds  Gastrointestinal: Soft, Non-tender	  Skin: No cyanosis	  Neurologic: Non-focal  Extremities: 2+ LE edema  Psychiatry: A & O x 3, Mood & affect appropriate    LVAD Interrogation: HM2  Pump Flow: 4  Pump Speed: 9200  Pulse Index: 5.3  Pump Power: 4.9  VAD Events:   Driveline exit site evaluation: soft, non-tender  Programming Changes: No changes made    Labs:                        8.2    12.7  )-----------( 466      ( 2017 03:04 )             25.0         136  |  98  |  60<H>  ----------------------------<  85  3.4<L>   |  24  |  1.72<H>    Ca    8.5      2017 03:04    TPro  5.9<L>  /  Alb  x   /  TBili  x   /  DBili  x   /  AST  x   /  ALT  x   /  AlkPhos  x       PT/INR - ( 2017 03:04 )   PT: 26.9 sec;   INR: 2.42 ratio      Lactate Dehydrogenase, Serum: 386 U/L ( @ 13:25)  Lactate Dehydrogenase, Serum: 312 U/L ( @ 02:30)

## 2017-06-28 NOTE — PROGRESS NOTE ADULT - PROBLEM SELECTOR PLAN 1
Continue Lasix drip at 10mg/hr as still significantly volume overloaded  MAP goal 70-80; predominantly at goal, mildly elevated, if persistently elevated will discuss afterload reduction

## 2017-06-28 NOTE — PROGRESS NOTE ADULT - ATTENDING COMMENTS
As above.  Will continue to monitor and reevaluate for EGD/capsule endoscopy.  Discussed with Dr. Stahl yesterday.

## 2017-06-28 NOTE — PROGRESS NOTE ADULT - ASSESSMENT
67 year old man with congestive heart failure with severely reduced LV systolic function due to a nonischemic dilated cardiomyopathy   s/p HM2 LVAD on 6/12,  prolonged intubation.  post-operative course complicated by self-terminating ventricular tachycardia and acute renal failure, which is stable.  He is having persistent SVT, likely atrial tachycardia. His hematocrit is decreased and required 1 unit of pRBCs  today. He has persistent leukocytosis without evidence of infection and completed a course of antibiotics, cultures negative.  His left  hand is swollen and tender. He remains on a lasix  infusion.  Transferred to SDU 6/26  HCT 22 6/26 , s/p prbc x 1, repeat hct pending, guiac pending. Protonix added. He remains on the lasix infusion, will d/w Dr Stahl  weaning the infusion.  Protein  in the urine as d/w renal , blood work ordered.  Guiac +, GI consulted, possible EGD.  6/28 EGD cancelled, continue protonix for probable gastric ulcer. Clear liquids

## 2017-06-28 NOTE — PROGRESS NOTE ADULT - ATTENDING COMMENTS
MAPs 80-90. Will follow for now. May start hydralazine if remain elevated.  Follow H/H.  Hold Coumadin.

## 2017-06-28 NOTE — CHART NOTE - NSCHARTNOTEFT_GEN_A_CORE
Just notified from GI fellow that the EGD is cancelled d/t the belief that the bleeding is from an ulcer which should heal with conservative measures, Pantoprazole infusion and clear liquids at present

## 2017-06-28 NOTE — PROGRESS NOTE ADULT - SUBJECTIVE AND OBJECTIVE BOX
Chief Complaint:  Patient is a 67y old  Male who presents with a chief complaint of Transfer from [facility] for management of ADHF/ LVAD eval (2017 00:32)      Interval Events: No evidence of rebleed. No further BMs. Remained NPO and on PPI drip last night. Cardiology decided to stop ASA and coumadin for now. INR 2.4 today.    Allergies:  No Known Allergies      Hospital Medications:  sodium chloride 0.9%. 1000 milliLiter(s) IV Continuous <Continuous>  docusate sodium 100 milliGRAM(s) Oral three times a day  dextrose 5%. 1000 milliLiter(s) IV Continuous <Continuous>  dextrose 50% Injectable 25 Gram(s) IV Push once  amiodarone    Tablet 200 milliGRAM(s) Oral daily  insulin lispro (HumaLOG) corrective regimen sliding scale   SubCutaneous three times a day before meals  insulin lispro (HumaLOG) corrective regimen sliding scale   SubCutaneous at bedtime  furosemide Infusion 10 mG/Hr IV Continuous <Continuous>  colchicine 0.6 milliGRAM(s) Oral daily  QUEtiapine 50 milliGRAM(s) Oral at bedtime  carvedilol 3.125 milliGRAM(s) Oral every 12 hours  pantoprazole Infusion 8 mG/Hr IV Continuous <Continuous>      PMHX/PSHX:  H/O prior ablation treatment  Ventricular fibrillation  PAF (paroxysmal atrial fibrillation)  Non-Ischemic Cardiomyopathy  SVT (Supraventricular Tachycardia)  HTN  CHF (Congestive Heart Failure)  Status post left hip replacement  Hypertension  Hypertension  History of Prior Ablation Treatment  AICD (Automatic Cardioverter/Defibrillator) Present    General:  No fevers, chills, night sweats, fatigue,   CV:  No pain, palpitations, hypo/hypertension  Resp:  No dyspnea, cough, tachypnea, wheezing  GI:  See HPI  :  No pain, bleeding, incontinence, nocturia  Heme:  No petechiae, ecchymosis, easy bruisability  Skin:  No rash, edema      PHYSICAL EXAM:     GENERAL:   no distress  CHEST:  Full & symmetric excursion, no increased effort, breath sounds clear  HEART:  Regular rhythm, S1, S2, no added sounds  ABDOMEN:  Soft, non-tender, non-distended, normoactive bowel sounds,   NEURO:  Alert, oriented, sitting comfortably in the chair.    Vital Signs:  Vital Signs Last 24 Hrs  T(C): 36.7 (2017 08:05), Max: 37 (2017 20:00)  T(F): 98 (2017 08:05), Max: 98.6 (2017 20:00)  HR: 87 (2017 08:05) (86 - 96)  BP: --  BP(mean): 80 (2017 08:05) (66 - 92)  RR: 18 (2017 08:05) (16 - 18)  SpO2: 100% (2017 08:05) (97% - 100%)  Daily     Daily Weight in k.4 (2017 09:00)    LABS:                        8.2    12.7  )-----------( 466      ( 2017 03:04 )             25.0     06-28    136  |  98  |  60<H>  ----------------------------<  85  3.4<L>   |  24  |  1.72<H>    Ca    8.5      2017 03:04    TPro  5.9<L>  /  Alb  x   /  TBili  x   /  DBili  x   /  AST  x   /  ALT  x   /  AlkPhos  x   06-27    LIVER FUNCTIONS - ( 2017 15:50 )  Alb: x     / Pro: 5.9 g/dL / ALK PHOS: x     / ALT: x     / AST: x     / GGT: x           PT/INR - ( 2017 03:04 )   PT: 26.9 sec;   INR: 2.42 ratio

## 2017-06-28 NOTE — PROGRESS NOTE ADULT - ASSESSMENT
67 year old man with ACC/AHA stage D congestive heart failure with severely reduced LV systolic function due to a nonischemic dilated cardiomyopathy s/p HM2 LVAD on 6/12 with post-operative course complicated by self-terminating ventricular tachycardia and acute renal failure, which is improved and stable. He continues to improve functionally off of milrinone. His hematocrit had dropped and again required 1 unit of pRBCs and now reports some melena overnight. His persistent leukocytosis is slowly improving. His hand is less swollen today.

## 2017-06-28 NOTE — PROGRESS NOTE ADULT - SUBJECTIVE AND OBJECTIVE BOX
Whats happening with me?    VITAL SIGNS    Telemetry:  NSR 80-90, 10bts wct    Vital Signs Last 24 Hrs  T(C): 36.7 (17 @ 08:05), Max: 37 (17 @ 20:00)  T(F): 98 (17 @ 08:05), Max: 98.6 (17 @ 20:00)  HR: 87 (17 @ 08:05) (86 - 96)  BP: --  RR: 18 (17 @ 08:05) (16 - 18)  SpO2: 100% (17 @ 08:05) (97% - 100%)             I&O's Detail    2017 07:  -  2017 07:00  --------------------------------------------------------  IN:    furosemide Infusion: 120 mL    Oral Fluid: 766 mL    pantoprazole Infusion: 120 mL  Total IN: 1006 mL    OUT:    Voided: 2950 mL  Total OUT: 2950 mL    Total NET: -1944 mL      2017 07:  -  2017 11:34  --------------------------------------------------------  IN:    furosemide Infusion: 15 mL    pantoprazole Infusion: 30 mL  Total IN: 45 mL    OUT:    Voided: 400 mL  Total OUT: 400 mL    Total NET: -355 mL             @ 07:  -   @ 07:00  --------------------------------------------------------  IN: 1006 mL / OUT: 2950 mL / NET: -1944 mL     @ 07:  -   @ 11:34  --------------------------------------------------------  IN: 45 mL / OUT: 400 mL / NET: -355 mL          Daily     Daily Weight in k.4 (2017 09:00)  Admit Wt: Drug Dosing Weight  Height (cm): 172.72 (2017 17:13)  Weight (kg): 75 (2017 15:17)  BMI (kg/m2): 25.1 (2017 17:13)  BSA (m2): 1.89 (2017 17:13)    Bilirubin Total, Serum: 2.1 mg/dL ( @ 14:20)    CAPILLARY BLOOD GLUCOSE  82 (2017 07:23)  93 (2017 21:38)  96 (2017 16:12)              Drains:   no     MS         [  ] Drainage:                 L Pleural  [  ]  Drainage:                R Pleural  [  ]  Drainage:    Pacing Wires   no     [  ]   Settings:                                  Isolated  [  ]    Coumadin    [x ] YES          [  ]      NO         Reason:    On hold for GI w/u                       MEDICATIONS  sodium chloride 0.9%. 1000 milliLiter(s) IV Continuous <Continuous>  docusate sodium 100 milliGRAM(s) Oral three times a day  dextrose 5%. 1000 milliLiter(s) IV Continuous <Continuous>  dextrose 50% Injectable 25 Gram(s) IV Push once  amiodarone    Tablet 200 milliGRAM(s) Oral daily  insulin lispro (HumaLOG) corrective regimen sliding scale   SubCutaneous three times a day before meals  insulin lispro (HumaLOG) corrective regimen sliding scale   SubCutaneous at bedtime  furosemide Infusion 10 mG/Hr IV Continuous <Continuous>  colchicine 0.6 milliGRAM(s) Oral daily  QUEtiapine 50 milliGRAM(s) Oral at bedtime  carvedilol 3.125 milliGRAM(s) Oral every 12 hours  pantoprazole Infusion 8 mG/Hr IV Continuous <Continuous>      PHYSICAL EXAM      Neurology: alert and oriented x 3, nonfocal, no gross deficits    CV : S1 S2 , RRR     Sternal Wound :  CDI , Stable    Lungs: CTA    Abdomen: soft, nontender, nondistended, positive bowel sounds, driveline dressing cdi    :         voiding    Extremities:       trace   edema, negative calve tenderness,               LABS      136  |  98  |  60<H>  ----------------------------<  85  3.4<L>   |  24  |  1.72<H>    Ca    8.5      2017 03:04    TPro  5.9<L>  /  Alb  x   /  TBili  x   /  DBili  x   /  AST  x   /  ALT  x   /  AlkPhos  x   06-27                                 8.2    12.7  )-----------( 466      ( 2017 03:04 )             25.0          PT/INR - ( 2017 03:04 )   PT: 26.9 sec;   INR: 2.42 ratio                    CXR:       PAST MEDICAL & SURGICAL HISTORY:  Ventricular fibrillation: s/p AICD  PAF (paroxysmal atrial fibrillation): on xarelto  Non-Ischemic Cardiomyopathy  SVT (Supraventricular Tachycardia)  HTN  CHF (Congestive Heart Failure)  Status post left hip replacement  History of Prior Ablation Treatment: for afib  AICD (Automatic Cardioverter/Defibrillator) Present: [employer] with 1 [employer] lead09- explanted and replaced with [employer] 2 leads on 09             Physical Therapy Rec:   Home  [  ]   Home w/ PT  [  ]  Rehab  [  ]    Discussed with Cardiothoracic Team at AM rounds.

## 2017-06-28 NOTE — PROGRESS NOTE ADULT - SUBJECTIVE AND OBJECTIVE BOX
[facility] DIVISION OF KIDNEY DISEASES AND HYPERTENSION -- [phone]   FOLLOW UP NOTE  --------------------------------------------------------------------------------  Chief Complaint: Heart failure      24hour events/Subjective: Pt seen and examined at bedside. Pt  with dark stools, guaiac positive and drop in Hb s/p pRBC.  possible EGD today.      PAST HISTORY  --------------------------------------------------------------------------------  No significant changes to PMH, PSH, FHx, SHx, unless otherwise noted    ALLERGIES & MEDICATIONS  --------------------------------------------------------------------------------  Allergies    No Known Allergies    Intolerances      Standing Inpatient Medications  sodium chloride 0.9%. 1000 milliLiter(s) IV Continuous <Continuous>  docusate sodium 100 milliGRAM(s) Oral three times a day  dextrose 5%. 1000 milliLiter(s) IV Continuous <Continuous>  dextrose 50% Injectable 25 Gram(s) IV Push once  amiodarone    Tablet 200 milliGRAM(s) Oral daily  insulin lispro (HumaLOG) corrective regimen sliding scale   SubCutaneous three times a day before meals  insulin lispro (HumaLOG) corrective regimen sliding scale   SubCutaneous at bedtime  furosemide Infusion 10 mG/Hr IV Continuous <Continuous>  colchicine 0.6 milliGRAM(s) Oral daily  QUEtiapine 50 milliGRAM(s) Oral at bedtime  carvedilol 3.125 milliGRAM(s) Oral every 12 hours  pantoprazole Infusion 8 mG/Hr IV Continuous <Continuous>  potassium chloride    Tablet ER 20 milliEquivalent(s) Oral every 2 hours    PRN Inpatient Medications      REVIEW OF SYSTEMS  --------------------------------------------------------------------------------  As above         VITALS/PHYSICAL EXAM  --------------------------------------------------------------------------------  T(C): 36.8 (06-28-17 @ 04:00), Max: 37 (06-27-17 @ 20:00)  HR: 96 (06-28-17 @ 05:00) (86 - 96)  BP: --  RR: 18 (06-28-17 @ 04:00) (16 - 20)  SpO2: 97% (06-28-17 @ 04:00) (93% - 100%)  Wt(kg): --        06-27-17 @ 07:01  -  06-28-17 @ 07:00  --------------------------------------------------------  IN: 991 mL / OUT: 2950 mL / NET: -1959 mL      Physical Exam:  	Gen: NAD  	HEENT: MMM  	Pulm: CTA B/L  	CV: S1S2  	Abd: Soft, +BS  	Ext: no  LE edema B/L                      Neuro: Awake   	Skin: Warm and Dry   	Other ; no emily    LABS/STUDIES  --------------------------------------------------------------------------------              8.2    12.7  >-----------<  466      [06-28-17 @ 03:04]              25.0     136  |  98  |  60  ----------------------------<  85      [06-28-17 @ 03:04]  3.4   |  24  |  1.72        Ca     8.5     [06-28-17 @ 03:04]    TPro  5.9  /  Alb  x   /  TBili  x   /  DBili  x   /  AST  x   /  ALT  x   /  AlkPhos  x   [06-27-17 @ 15:50]    PT/INR: PT 26.9 , INR 2.42       [06-28-17 @ 03:04]          [06-27-17 @ 13:25]    Creatinine Trend:  SCr 1.72 [06-28 @ 03:04]  SCr 1.81 [06-27 @ 14:20]  SCr 1.64 [06-27 @ 01:16]  SCr 1.84 [06-26 @ 02:30]  SCr 1.79 [06-25 @ 01:46]    Urinalysis - [06-21-17 @ 18:17]      Color Yellow / Appearance  / SG 1.010 / pH 7.0      Gluc Negative / Ketone Negative  / Bili Negative / Urobili 4       Blood Small / Protein 30 / Leuk Est Moderate / Nitrite Negative      RBC 3-5 / WBC 6-10 / Hyaline  / Gran  / Sq Epi  / Non Sq Epi OCC / Bacteria Few    Urine Creatinine 32      [06-21-17 @ 22:07]  Urine Protein 53      [06-21-17 @ 22:07]  Urine Sodium 42      [06-21-17 @ 18:17]  Urine Chloride 46      [06-21-17 @ 18:17]    Iron 20, TIBC 352, %sat 6      [06-08-17 @ 07:14]  Ferritin 121.0      [06-09-17 @ 19:08]  HbA1c 5.5      [06-07-17 @ 06:14]  TSH 2.33      [06-14-17 @ 14:51]  Lipid: chol 62, TG 33, HDL 17, LDL 38      [06-07-17 @ 06:14] [facility] DIVISION OF KIDNEY DISEASES AND HYPERTENSION -- [phone]   FOLLOW UP NOTE  --------------------------------------------------------------------------------  Chief Complaint: Heart failure      24hour events/Subjective: Pt seen and examined at bedside. Pt  with dark stools, guaiac positive and drop in Hb s/p pRBC.  possible EGD today.      PAST HISTORY  --------------------------------------------------------------------------------  No significant changes to PMH, PSH, FHx, SHx, unless otherwise noted    ALLERGIES & MEDICATIONS  --------------------------------------------------------------------------------  Allergies    No Known Allergies    Intolerances      Standing Inpatient Medications  sodium chloride 0.9%. 1000 milliLiter(s) IV Continuous <Continuous>  docusate sodium 100 milliGRAM(s) Oral three times a day  dextrose 5%. 1000 milliLiter(s) IV Continuous <Continuous>  dextrose 50% Injectable 25 Gram(s) IV Push once  amiodarone    Tablet 200 milliGRAM(s) Oral daily  insulin lispro (HumaLOG) corrective regimen sliding scale   SubCutaneous three times a day before meals  insulin lispro (HumaLOG) corrective regimen sliding scale   SubCutaneous at bedtime  furosemide Infusion 10 mG/Hr IV Continuous <Continuous>  colchicine 0.6 milliGRAM(s) Oral daily  QUEtiapine 50 milliGRAM(s) Oral at bedtime  carvedilol 3.125 milliGRAM(s) Oral every 12 hours  pantoprazole Infusion 8 mG/Hr IV Continuous <Continuous>  potassium chloride    Tablet ER 20 milliEquivalent(s) Oral every 2 hours    PRN Inpatient Medications      REVIEW OF SYSTEMS  --------------------------------------------------------------------------------  As above         VITALS/PHYSICAL EXAM  --------------------------------------------------------------------------------  T(C): 36.8 (06-28-17 @ 04:00), Max: 37 (06-27-17 @ 20:00)  HR: 96 (06-28-17 @ 05:00) (86 - 96)  BP: --  RR: 18 (06-28-17 @ 04:00) (16 - 20)  SpO2: 97% (06-28-17 @ 04:00) (93% - 100%)  Wt(kg): --        06-27-17 @ 07:01  -  06-28-17 @ 07:00  --------------------------------------------------------  IN: 991 mL / OUT: 2950 mL / NET: -1959 mL      Physical Exam:  	Gen: NAD  	HEENT: MMM  	Pulm: CTA B/L  	CV: +LVAD sound  	Abd: Soft, +BS  	Ext: +  LE edema B/L                      Neuro: Awake   	Skin: Warm and Dry   	Other ; no emily    LABS/STUDIES  --------------------------------------------------------------------------------              8.2    12.7  >-----------<  466      [06-28-17 @ 03:04]              25.0     136  |  98  |  60  ----------------------------<  85      [06-28-17 @ 03:04]  3.4   |  24  |  1.72        Ca     8.5     [06-28-17 @ 03:04]    TPro  5.9  /  Alb  x   /  TBili  x   /  DBili  x   /  AST  x   /  ALT  x   /  AlkPhos  x   [06-27-17 @ 15:50]    PT/INR: PT 26.9 , INR 2.42       [06-28-17 @ 03:04]          [06-27-17 @ 13:25]    Creatinine Trend:  SCr 1.72 [06-28 @ 03:04]  SCr 1.81 [06-27 @ 14:20]  SCr 1.64 [06-27 @ 01:16]  SCr 1.84 [06-26 @ 02:30]  SCr 1.79 [06-25 @ 01:46]    Urinalysis - [06-21-17 @ 18:17]      Color Yellow / Appearance  / SG 1.010 / pH 7.0      Gluc Negative / Ketone Negative  / Bili Negative / Urobili 4       Blood Small / Protein 30 / Leuk Est Moderate / Nitrite Negative      RBC 3-5 / WBC 6-10 / Hyaline  / Gran  / Sq Epi  / Non Sq Epi OCC / Bacteria Few    Urine Creatinine 32      [06-21-17 @ 22:07]  Urine Protein 53      [06-21-17 @ 22:07]  Urine Sodium 42      [06-21-17 @ 18:17]  Urine Chloride 46      [06-21-17 @ 18:17]    Iron 20, TIBC 352, %sat 6      [06-08-17 @ 07:14]  Ferritin 121.0      [06-09-17 @ 19:08]  HbA1c 5.5      [06-07-17 @ 06:14]  TSH 2.33      [06-14-17 @ 14:51]  Lipid: chol 62, TG 33, HDL 17, LDL 38      [06-07-17 @ 06:14]

## 2017-06-29 LAB
% ALBUMIN: 41.8 % — SIGNIFICANT CHANGE UP
% ALPHA 1: 11.1 % — SIGNIFICANT CHANGE UP
% ALPHA 2: 13.2 % — SIGNIFICANT CHANGE UP
% BETA: 13.6 % — SIGNIFICANT CHANGE UP
% GAMMA: 20.3 % — SIGNIFICANT CHANGE UP
ALBUMIN SERPL ELPH-MCNC: 2.5 G/DL — LOW (ref 3.6–5.5)
ALBUMIN SERPL ELPH-MCNC: 2.6 G/DL — LOW (ref 3.3–5)
ALBUMIN/GLOB SERPL ELPH: 0.7 RATIO — SIGNIFICANT CHANGE UP
ALP SERPL-CCNC: 120 U/L — SIGNIFICANT CHANGE UP (ref 40–120)
ALPHA1 GLOB SERPL ELPH-MCNC: 0.7 G/DL — HIGH (ref 0.1–0.4)
ALPHA2 GLOB SERPL ELPH-MCNC: 0.8 G/DL — SIGNIFICANT CHANGE UP (ref 0.5–1)
ALT FLD-CCNC: 43 U/L RC — SIGNIFICANT CHANGE UP (ref 10–45)
ANION GAP SERPL CALC-SCNC: 13 MMOL/L — SIGNIFICANT CHANGE UP (ref 5–17)
AST SERPL-CCNC: 56 U/L — HIGH (ref 10–40)
B-GLOBULIN SERPL ELPH-MCNC: 0.8 G/DL — SIGNIFICANT CHANGE UP (ref 0.5–1)
BILIRUB SERPL-MCNC: 1.8 MG/DL — HIGH (ref 0.2–1.2)
BUN SERPL-MCNC: 48 MG/DL — HIGH (ref 7–23)
CALCIUM SERPL-MCNC: 8.8 MG/DL — SIGNIFICANT CHANGE UP (ref 8.4–10.5)
CHLORIDE SERPL-SCNC: 96 MMOL/L — SIGNIFICANT CHANGE UP (ref 96–108)
CO2 SERPL-SCNC: 23 MMOL/L — SIGNIFICANT CHANGE UP (ref 22–31)
CREAT SERPL-MCNC: 1.72 MG/DL — HIGH (ref 0.5–1.3)
GAMMA GLOBULIN: 1.2 G/DL — SIGNIFICANT CHANGE UP (ref 0.6–1.6)
GLUCOSE SERPL-MCNC: 120 MG/DL — HIGH (ref 70–99)
HAPTOGLOB SERPL-MCNC: 160 MG/DL — SIGNIFICANT CHANGE UP (ref 34–200)
HCT VFR BLD CALC: 25.5 % — LOW (ref 39–50)
HGB BLD-MCNC: 8.5 G/DL — LOW (ref 13–17)
INR BLD: 2.18 RATIO — HIGH (ref 0.88–1.16)
INTERPRETATION SERPL IFE-IMP: SIGNIFICANT CHANGE UP
LDH SERPL L TO P-CCNC: 302 U/L — HIGH (ref 50–242)
MCHC RBC-ENTMCNC: 30.6 PG — SIGNIFICANT CHANGE UP (ref 27–34)
MCHC RBC-ENTMCNC: 33.5 GM/DL — SIGNIFICANT CHANGE UP (ref 32–36)
MCV RBC AUTO: 91.3 FL — SIGNIFICANT CHANGE UP (ref 80–100)
PLATELET # BLD AUTO: 487 K/UL — HIGH (ref 150–400)
POTASSIUM SERPL-MCNC: 3.6 MMOL/L — SIGNIFICANT CHANGE UP (ref 3.5–5.3)
POTASSIUM SERPL-SCNC: 3.6 MMOL/L — SIGNIFICANT CHANGE UP (ref 3.5–5.3)
PROT PATTERN SERPL ELPH-IMP: SIGNIFICANT CHANGE UP
PROT SERPL-MCNC: 6.3 G/DL — SIGNIFICANT CHANGE UP (ref 6–8.3)
PROTHROM AB SERPL-ACNC: 24.1 SEC — HIGH (ref 9.8–12.7)
RBC # BLD: 2.79 M/UL — LOW (ref 4.2–5.8)
RBC # FLD: 18.1 % — HIGH (ref 10.3–14.5)
SODIUM SERPL-SCNC: 132 MMOL/L — LOW (ref 135–145)
WBC # BLD: 10 K/UL — SIGNIFICANT CHANGE UP (ref 3.8–10.5)
WBC # FLD AUTO: 10 K/UL — SIGNIFICANT CHANGE UP (ref 3.8–10.5)

## 2017-06-29 PROCEDURE — 71010: CPT | Mod: 26

## 2017-06-29 PROCEDURE — 99232 SBSQ HOSP IP/OBS MODERATE 35: CPT | Mod: GC

## 2017-06-29 PROCEDURE — 93750 INTERROGATION VAD IN PERSON: CPT

## 2017-06-29 PROCEDURE — 99233 SBSQ HOSP IP/OBS HIGH 50: CPT | Mod: 25,GC

## 2017-06-29 RX ORDER — WARFARIN SODIUM 2.5 MG/1
1 TABLET ORAL ONCE
Qty: 0 | Refills: 0 | Status: COMPLETED | OUTPATIENT
Start: 2017-06-29 | End: 2017-06-29

## 2017-06-29 RX ORDER — FUROSEMIDE 40 MG
40 TABLET ORAL
Qty: 0 | Refills: 0 | Status: DISCONTINUED | OUTPATIENT
Start: 2017-06-29 | End: 2017-07-03

## 2017-06-29 RX ADMIN — QUETIAPINE FUMARATE 50 MILLIGRAM(S): 200 TABLET, FILM COATED ORAL at 21:23

## 2017-06-29 RX ADMIN — PANTOPRAZOLE SODIUM 10 MG/HR: 20 TABLET, DELAYED RELEASE ORAL at 08:16

## 2017-06-29 RX ADMIN — Medication 0.6 MILLIGRAM(S): at 12:09

## 2017-06-29 RX ADMIN — Medication 100 MILLIGRAM(S): at 12:09

## 2017-06-29 RX ADMIN — Medication 5 MG/HR: at 08:17

## 2017-06-29 RX ADMIN — CARVEDILOL PHOSPHATE 3.12 MILLIGRAM(S): 80 CAPSULE, EXTENDED RELEASE ORAL at 16:28

## 2017-06-29 RX ADMIN — CARVEDILOL PHOSPHATE 3.12 MILLIGRAM(S): 80 CAPSULE, EXTENDED RELEASE ORAL at 05:59

## 2017-06-29 RX ADMIN — Medication 40 MILLIGRAM(S): at 16:28

## 2017-06-29 RX ADMIN — WARFARIN SODIUM 1 MILLIGRAM(S): 2.5 TABLET ORAL at 21:22

## 2017-06-29 RX ADMIN — AMIODARONE HYDROCHLORIDE 200 MILLIGRAM(S): 400 TABLET ORAL at 05:59

## 2017-06-29 NOTE — PROGRESS NOTE ADULT - ASSESSMENT
GI Bleed : Resolved  Continue IV PPI infusion for total of 72 hrs.  Can advance diet to full liquids   Monitor CBC and transfuse as needed.  Will follow up.

## 2017-06-29 NOTE — PROGRESS NOTE ADULT - PROBLEM SELECTOR PLAN 1
Continue Lasix drip at 10mg/hr /volume overloaded as per Heart Failure team  strict I &O's Continue Lasix drip at 10mg/hr /volume overloaded as per Heart Failure team  strict I &O's  Addendum : lasix gtt d/c'd as per CHF team - pt. placed on lasix 40mg po bid.

## 2017-06-29 NOTE — PROGRESS NOTE ADULT - SUBJECTIVE AND OBJECTIVE BOX
[facility] DIVISION OF KIDNEY DISEASES AND HYPERTENSION -- [phone]   FOLLOW UP NOTE  --------------------------------------------------------------------------------  Chief Complaint: Heart failure      24hour events/Subjective: Pt seen and examined. Denies sob, urinary complaints.         PAST HISTORY  --------------------------------------------------------------------------------  No significant changes to PMH, PSH, FHx, SHx, unless otherwise noted    ALLERGIES & MEDICATIONS  --------------------------------------------------------------------------------  Allergies    No Known Allergies    Intolerances      Standing Inpatient Medications  sodium chloride 0.9%. 1000 milliLiter(s) IV Continuous <Continuous>  docusate sodium 100 milliGRAM(s) Oral three times a day  dextrose 5%. 1000 milliLiter(s) IV Continuous <Continuous>  dextrose 50% Injectable 25 Gram(s) IV Push once  amiodarone    Tablet 200 milliGRAM(s) Oral daily  insulin lispro (HumaLOG) corrective regimen sliding scale   SubCutaneous three times a day before meals  insulin lispro (HumaLOG) corrective regimen sliding scale   SubCutaneous at bedtime  furosemide Infusion 10 mG/Hr IV Continuous <Continuous>  colchicine 0.6 milliGRAM(s) Oral daily  QUEtiapine 50 milliGRAM(s) Oral at bedtime  carvedilol 3.125 milliGRAM(s) Oral every 12 hours  pantoprazole Infusion 8 mG/Hr IV Continuous <Continuous>    PRN Inpatient Medications      REVIEW OF SYSTEMS  --------------------------------------------------------------------------------  As above         VITALS/PHYSICAL EXAM  --------------------------------------------------------------------------------  T(C): 36.5 (06-29-17 @ 07:30), Max: 37 (06-28-17 @ 20:00)  HR: 76 (06-29-17 @ 07:30) (76 - 85)  BP: --  RR: 18 (06-29-17 @ 07:30) (16 - 18)  SpO2: 98% (06-29-17 @ 07:30) (98% - 100%)  Wt(kg): --        06-28-17 @ 07:01  -  06-29-17 @ 07:00  --------------------------------------------------------  IN: 1620 mL / OUT: 2600 mL / NET: -980 mL      Physical Exam:  	Gen: NAD  	HEENT: MMM  	Pulm: CTA B/L  	CV: +LVAD sound  	Abd: Soft, +BS  	Ext: No LE edema B/L                      Neuro: Awake   	Skin: Warm and Dry   	Other ; no emily      LABS/STUDIES  --------------------------------------------------------------------------------              8.5    10.0  >-----------<  487      [06-29-17 @ 05:07]              25.5     132  |  96  |  48  ----------------------------<  120      [06-29-17 @ 05:07]  3.6   |  23  |  1.72        Ca     8.8     [06-29-17 @ 05:07]    TPro  6.3  /  Alb  2.6  /  TBili  1.8  /  DBili  x   /  AST  56  /  ALT  43  /  AlkPhos  120  [06-29-17 @ 05:07]    PT/INR: PT 24.1 , INR 2.18       [06-29-17 @ 05:07]          [06-29-17 @ 05:07]    Creatinine Trend:  SCr 1.72 [06-29 @ 05:07]  SCr 1.72 [06-28 @ 03:04]  SCr 1.81 [06-27 @ 14:20]  SCr 1.64 [06-27 @ 01:16]  SCr 1.84 [06-26 @ 02:30]    Urinalysis - [06-21-17 @ 18:17]      Color Yellow / Appearance  / SG 1.010 / pH 7.0      Gluc Negative / Ketone Negative  / Bili Negative / Urobili 4       Blood Small / Protein 30 / Leuk Est Moderate / Nitrite Negative      RBC 3-5 / WBC 6-10 / Hyaline  / Gran  / Sq Epi  / Non Sq Epi OCC / Bacteria Few      Iron 20, TIBC 352, %sat 6      [06-08-17 @ 07:14]  Ferritin 121.0      [06-09-17 @ 19:08]  HbA1c 5.5      [06-07-17 @ 06:14]  TSH 2.33      [06-14-17 @ 14:51]  Lipid: chol 62, TG 33, HDL 17, LDL 38      [06-07-17 @ 06:14]      Free Light Chains: kappa 16.30, lambda 11.10, ratio = 1.47      [06-27 @ 15:50]

## 2017-06-29 NOTE — PROGRESS NOTE ADULT - PROBLEM SELECTOR PLAN 1
hemodynamically mediated in setting of decompensated heart failure +/- urinary retention component and hypotension.  Pt stable Scr at 1.7 today (from 1.7 yesterday). Pt with  good urine output.  Monitor BMP  Strict I/O, avoid nephrotoxics, NSAIDs, RCA. Renal dose of medications.

## 2017-06-29 NOTE — PROGRESS NOTE ADULT - ATTENDING COMMENTS
Impression:    #1.  GI bleed with melena, no evidence of ongoing GI bleed at this time.    #2.  Blood loss anemia    #3.  CHF, s/p LVAD    Plan:    #1  Follow CBC/transfuse as necessary    #2.. PPI.    Will follow

## 2017-06-29 NOTE — PROGRESS NOTE ADULT - ASSESSMENT
67 year old man with congestive heart failure with severely reduced LV systolic function due to a nonischemic dilated cardiomyopathy  s/p HM2 LVAD on 6/12,  prolonged intubation.  post-operative course complicated by self-terminating ventricular tachycardia and acute renal failure, which is stable.  He is having persistent SVT, likely atrial tachycardia. His hematocrit is decreased and required 1 unit of pRBCs  today. He has persistent leukocytosis without evidence of infection and completed a course of antibiotics, cultures negative.  His left  hand is swollen and tender. He remains on a lasix  infusion.  Transferred to SDU 6/26  HCT 22 6/26 , s/p prbc x 1,  Protein  in the urine as d/w renal , blood work ordered.  [place] +, GI consulted, 6/28 EGD cancelled, continue protonix for probable gastric ulcer. Clear liquids 67 year old man with congestive heart failure with severely reduced LV systolic function due to a nonischemic dilated cardiomyopathy  s/p HM2 LVAD on 6/12,  prolonged intubation.  post-operative course complicated by self-terminating ventricular tachycardia and acute renal failure, which is stable.  He is having persistent SVT, likely atrial tachycardia. His hematocrit is decreased and required 1 unit of pRBCs  today. He has persistent leukocytosis without evidence of infection and completed a course of antibiotics, cultures negative.  His left  hand is swollen and tender. Lasix gtt d/c'd this afternoon.  Transferred to SDU 6/26  HCT 22 6/26 , s/p prbc x 1,  Protein  in the urine as d/w renal , blood work ordered.  Guiac +, GI consulted, 6/28 EGD cancelled, continue protonix for probable gastric ulcer. Clear liquids

## 2017-06-29 NOTE — PROGRESS NOTE ADULT - SUBJECTIVE AND OBJECTIVE BOX
VITAL SIGNS- Tele: SR 1st degree HB 70-90    T(C): 36.5 (29 Jun 2017 07:30), Max: 37 (28 Jun 2017 20:00)  BP(mean): 80 (29 Jun 2017 09:13) (64 - 90)  SpO2: 98% (29 Jun 2017 07:30) (98% - 100%)          -------------------------------------------------------  IN: 1620 mL / OUT: 2600 mL / NET: -980 mL     Admit Weight (kg): 75 (11 Jun 2017 15:17)  BMI (kg/m2): 25.1 (11 Jun 2017 17:13)  BSA (m2): 1.89 (11 Jun 2017 17:13)    MEDICATIONS  docusate sodium 100 milliGRAM(s) Oral three times a day  amiodarone    Tablet 200 milliGRAM(s) Oral daily  furosemide Infusion 10 mG/Hr IV Continuous <Continuous>  colchicine 0.6 milliGRAM(s) Oral daily  QUEtiapine 50 milliGRAM(s) Oral at bedtime  carvedilol 3.125 milliGRAM(s) Oral every 12 hours  pantoprazole Infusion 8 mG/Hr IV Continuous <Continuous>    PHYSICAL EXAM  Subjective: "I'm OK"  Neurology: alert and oriented x 3, nonfocal, no gross deficits  CV : +LVAD sounds  Sternal Wound :  CDI , Stable  Lungs: CTA B/L   Abdomen: soft, nontender, nondistended, positive bowel sounds x 4 quadrants, LBM:   :     Voiding   Extremities:   B/L LE  Negative calf tenderness; (+) 2 DP palpable     LABS-6-29  132<L>  |  96  |  48<H>  ----------------------------<  120<H>  3.6   |  23  |  1.72<H>                        8.5    10.0  )-----------( 487      ( 29 Jun 2017 05:07 )             25.5          PT/INR - INR: 2.18 ratio       PAST MEDICAL & SURGICAL HISTORY:  PAF (paroxysmal atrial fibrillation): on xarelto  Non-Ischemic Cardiomyopathy  SVT (Supraventricular Tachycardia)  CHF (Congestive Heart Failure) Acute on Chronic Systolic   History of Prior Ablation Treatment: for afib  AICD (Automatic Cardioverter/Defibrillator) Present: [employer] with 1 [employer] lead4/1/09- explanted and replaced with [employer] 2 leads on 9/2/09    Discussed with Cardiothoracic Team at AM rounds. VITAL SIGNS- Tele: SR 1st degree HB 70-90    T(C): 36.5 (29 Jun 2017 07:30), Max: 37 (28 Jun 2017 20:00)  BP(mean): 80 (29 Jun 2017 09:13) (64 - 90)  SpO2: 98% (29 Jun 2017 07:30) (98% - 100%)          -------------------------------------------------------  IN: 1620 mL / OUT: 2600 mL / NET: -980 mL     Admit Weight (kg): 75 (11 Jun 2017 15:17)  BMI (kg/m2): 25.1 (11 Jun 2017 17:13)  BSA (m2): 1.89 (11 Jun 2017 17:13)    MEDICATIONS  docusate sodium 100 milliGRAM(s) Oral three times a day  amiodarone    Tablet 200 milliGRAM(s) Oral daily  furosemide Infusion 10 mG/Hr IV Continuous <Continuous>  colchicine 0.6 milliGRAM(s) Oral daily  QUEtiapine 50 milliGRAM(s) Oral at bedtime  carvedilol 3.125 milliGRAM(s) Oral every 12 hours  pantoprazole Infusion 8 mG/Hr IV Continuous <Continuous>    PHYSICAL EXAM  Subjective: "I'm OK"  Neurology: alert and oriented x 3, nonfocal, no gross deficits  CV : +LVAD sounds  Sternal Wound :  CDI , Stable  Lungs: CTA B/L   Abdomen: soft, nontender, nondistended, positive bowel sounds x 4 quadrants, LBM: 6/28  :     Voiding   Extremities:   B/L LE  Negative calf tenderness; (+) 2 DP palpable no edema     LABS-6-29  132<L>  |  96  |  48<H>  ----------------------------<  120<H>  3.6   |  23  |  1.72<H>                        8.5    10.0  )-----------( 487      ( 29 Jun 2017 05:07 )             25.5          PT/INR - INR: 2.18 ratio       PAST MEDICAL & SURGICAL HISTORY:  PAF (paroxysmal atrial fibrillation): on xarelto  Non-Ischemic Cardiomyopathy  SVT (Supraventricular Tachycardia)  CHF (Congestive Heart Failure) Acute on Chronic Systolic   History of Prior Ablation Treatment: for afib  AICD (Automatic Cardioverter/Defibrillator) Present: [employer] with 1 [employer] lead4/1/09- explanted and replaced with [employer] 2 leads on 9/2/09    Discussed with Cardiothoracic Team at AM rounds.

## 2017-06-29 NOTE — PROGRESS NOTE ADULT - PROBLEM SELECTOR PLAN 1
Patient appears to be euvolemic to volume depleted at this point.  Noted dizziness when standing.  Would hold lasix gtt for the time being with the plan to reinitiate oral lasix in a day or two. ?Hypovolemic from GIB and/or overdiuresis.

## 2017-06-29 NOTE — PROGRESS NOTE ADULT - ATTENDING COMMENTS
NORMAN on CKD post LVAD: Likely in the setting of cardiogenic shock   Renal function stable with excellent urine output on lasix drip. This may be his new baseline  Proteinuria of 1.7 gms noted. Await the  SPEP/SIFE. Free light chain ratio is OK    Anemia: +stool guaic. On  protonix drip  Possibly for EGD today     Godwin Lucia MD NORMAN on CKD post LVAD: Likely in the setting of cardiogenic shock   Renal function stable with excellent urine output on lasix drip. This is likely to be his new baseline  Proteinuria of 1.7 gms noted. Await the  SPEP/SIFE. Free light chain ratio is OK  Please recheck Urine Protein/Creatinine ratio  Volume status significantly improved with diuresis. Lasix management per heart failure    Anemia: +stool guaic. On  protonix drip  GI following       Godwin Lucia MD

## 2017-06-29 NOTE — PROGRESS NOTE ADULT - SUBJECTIVE AND OBJECTIVE BOX
Interval History: Patient still with melena last night.  No bowel movements since then.  Notes dizziness upon standing.     Medications:  sodium chloride 0.9%. 1000 milliLiter(s) IV Continuous <Continuous>  docusate sodium 100 milliGRAM(s) Oral three times a day  amiodarone    Tablet 200 milliGRAM(s) Oral daily  furosemide Infusion 10 mG/Hr IV Continuous <Continuous>  colchicine 0.6 milliGRAM(s) Oral daily  QUEtiapine 50 milliGRAM(s) Oral at bedtime  carvedilol 3.125 milliGRAM(s) Oral every 12 hours  pantoprazole Infusion 8 mG/Hr IV Continuous <Continuous>    Vitals:  T(C): 36.4 (06-29-17 @ 12:25), Max: 37 (06-28-17 @ 20:00)  HR: 80 (06-29-17 @ 12:25) (76 - 85)  BP(mean): 70 (06-29-17 @ 12:25) (64 - 90)  RR: 18 (06-29-17 @ 12:25) (16 - 18)  SpO2: 97% (06-29-17 @ 12:25) (97% - 99%)      I&O's Summary    28 Jun 2017 07:01  -  29 Jun 2017 07:00  --------------------------------------------------------  IN: 1620 mL / OUT: 2600 mL / NET: -980 mL        Physical Exam:  Appearance: No Acute Distress  HEENT: No JVD  Cardiovascular: Normal S1 S2, No murmurs/rubs/gallops  Respiratory: Clear to auscultation bilaterally  Gastrointestinal: Soft, Non-tender	  Skin: No cyanosis	  Neurologic: Non-focal  Extremities: No LE edema  Psychiatry: A & O x 3, Mood & affect appropriate    LVAD Interrogation:  Pump Flow: 4.0  Pump Speed: 9200  Pulse Index: 6.6  Pump Power: 4.9  VAD Events: PI event 3.5 6am  Driveline evaluation:    Programming Changes: No changes made    Labs:                        8.5    10.0  )-----------( 487      ( 29 Jun 2017 05:07 )             25.5     06-29    132<L>  |  96  |  48<H>  ----------------------------<  120<H>  3.6   |  23  |  1.72<H>    Ca    8.8      29 Jun 2017 05:07    TPro  6.3  /  Alb  2.6<L>  /  TBili  1.8<H>  /  DBili  x   /  AST  56<H>  /  ALT  43  /  AlkPhos  120  06-29    PT/INR - ( 29 Jun 2017 05:07 )   PT: 24.1 sec;   INR: 2.18 ratio          Lactate Dehydrogenase, Serum: 302 U/L (06-29 @ 05:07)  Lactate Dehydrogenase, Serum: 386 U/L (06-27 @ 13:25)

## 2017-06-29 NOTE — PROGRESS NOTE ADULT - ATTENDING COMMENTS
Holding Coumadin. Would ask GI when safe to resume.  Follow Hgb.  Change Lasix gtt to Lasix 40 po bid.

## 2017-06-29 NOTE — PROGRESS NOTE ADULT - SUBJECTIVE AND OBJECTIVE BOX
Chief Complaint:  Patient is a 67y old  Male who presents with a chief complaint of Transfer from [facility] for management of ADHF/ LVAD eval (2017 00:32)      Interval Events: One dark BM yesterday, Hb stable since yesterday., Tolerating clears and on IV PPI infusion.    Allergies:  No Known Allergies      Hospital Medications:  sodium chloride 0.9%. 1000 milliLiter(s) IV Continuous <Continuous>  docusate sodium 100 milliGRAM(s) Oral three times a day  dextrose 5%. 1000 milliLiter(s) IV Continuous <Continuous>  dextrose 50% Injectable 25 Gram(s) IV Push once  amiodarone    Tablet 200 milliGRAM(s) Oral daily  insulin lispro (HumaLOG) corrective regimen sliding scale   SubCutaneous three times a day before meals  insulin lispro (HumaLOG) corrective regimen sliding scale   SubCutaneous at bedtime  furosemide Infusion 10 mG/Hr IV Continuous <Continuous>  colchicine 0.6 milliGRAM(s) Oral daily  QUEtiapine 50 milliGRAM(s) Oral at bedtime  carvedilol 3.125 milliGRAM(s) Oral every 12 hours  pantoprazole Infusion 8 mG/Hr IV Continuous <Continuous>      PMHX/PSHX:  H/O prior ablation treatment  Ventricular fibrillation  PAF (paroxysmal atrial fibrillation)  Non-Ischemic Cardiomyopathy  SVT (Supraventricular Tachycardia)  HTN  CHF (Congestive Heart Failure)  Status post left hip replacement  Hypertension  Hypertension  History of Prior Ablation Treatment  AICD (Automatic Cardioverter/Defibrillator) Present      Family history:  No pertinent family history in first degree relatives      ROS:     General:  No fevers, chills, night sweats, fatigue,   CV:  No pain, palpitations, hypo/hypertension  Resp:  No dyspnea, cough, tachypnea, wheezing  GI:  See HPI  Heme:  No petechiae, ecchymosis, easy bruisability  Skin:  No rash, edema      PHYSICAL EXAM:     GENERAL:  Appears stated age, well-groomed, well-nourished, no distress  HEENT:  NC/AT,  conjunctivae clear, sclera -anicteric  CHEST:  Full & symmetric excursion, no increased effort, breath sounds clear  HEART:  Regular rhythm, S1, S2, no murmur/rub/S3/S4,  no edema  ABDOMEN:  Soft, non-tender, non-distended, normoactive bowel sounds,  no masses ,no hepato-splenomegaly,   EXTREMITIES:  no cyanosis,clubbing or edema  SKIN:  No rash/erythema/ecchymoses/petechiae/wounds/abscess/warm/dry  NEURO:  Alert, oriented    Vital Signs:  Vital Signs Last 24 Hrs  T(C): 36.5 (2017 07:30), Max: 37 (2017 20:00)  T(F): 97.7 (2017 07:30), Max: 98.6 (2017 20:00)  HR: 76 (2017 07:30) (76 - 85)  BP: --  BP(mean): 64 (2017 08:35) (64 - 90)  RR: 18 (2017 07:30) (16 - 18)  SpO2: 98% (2017 07:30) (98% - 100%)  Daily     Daily Weight in k.4 (2017 09:00)    LABS:                        8.5    10.0  )-----------( 487      ( 2017 05:07 )             25.5     06-    132<L>  |  96  |  48<H>  ----------------------------<  120<H>  3.6   |  23  |  1.72<H>    Ca    8.8      2017 05:07    TPro  6.3  /  Alb  2.6<L>  /  TBili  1.8<H>  /  DBili  x   /  AST  56<H>  /  ALT  43  /  AlkPhos  120  -    LIVER FUNCTIONS - ( 2017 05:07 )  Alb: 2.6 g/dL / Pro: 6.3 g/dL / ALK PHOS: 120 U/L / ALT: 43 U/L RC / AST: 56 U/L / GGT: x           PT/INR - ( 2017 05:07 )   PT: 24.1 sec;   INR: 2.18 ratio

## 2017-06-29 NOTE — PROGRESS NOTE ADULT - PROBLEM SELECTOR PLAN 2
Continue aspirin 81 mg daily.  Coumadin dosing tonight for INR 2.1. will d/w LVAD NP- goal INR 2-2.5  ambulation, d/c planning Continue aspirin 81 mg daily.  Coumadin dosing tonight for INR 2.1. will d/w LVAD NP- goal INR 2-2.5-1mg coumadin to be given  ambulation, d/c planning

## 2017-06-30 LAB
ALBUMIN SERPL ELPH-MCNC: 2.6 G/DL — LOW (ref 3.3–5)
ALP SERPL-CCNC: 115 U/L — SIGNIFICANT CHANGE UP (ref 40–120)
ALT FLD-CCNC: 40 U/L RC — SIGNIFICANT CHANGE UP (ref 10–45)
ANION GAP SERPL CALC-SCNC: 14 MMOL/L — SIGNIFICANT CHANGE UP (ref 5–17)
APTT BLD: 36.1 SEC — SIGNIFICANT CHANGE UP (ref 27.5–37.4)
AST SERPL-CCNC: 55 U/L — HIGH (ref 10–40)
BILIRUB SERPL-MCNC: 1.6 MG/DL — HIGH (ref 0.2–1.2)
BUN SERPL-MCNC: 45 MG/DL — HIGH (ref 7–23)
CALCIUM SERPL-MCNC: 8.6 MG/DL — SIGNIFICANT CHANGE UP (ref 8.4–10.5)
CHLORIDE SERPL-SCNC: 98 MMOL/L — SIGNIFICANT CHANGE UP (ref 96–108)
CO2 SERPL-SCNC: 22 MMOL/L — SIGNIFICANT CHANGE UP (ref 22–31)
CREAT ?TM UR-MCNC: 69 MG/DL — SIGNIFICANT CHANGE UP
CREAT SERPL-MCNC: 1.72 MG/DL — HIGH (ref 0.5–1.3)
GLUCOSE SERPL-MCNC: 75 MG/DL — SIGNIFICANT CHANGE UP (ref 70–99)
HAPTOGLOB SERPL-MCNC: 140 MG/DL — SIGNIFICANT CHANGE UP (ref 34–200)
HCT VFR BLD CALC: 23.5 % — LOW (ref 39–50)
HGB BLD-MCNC: 8.1 G/DL — LOW (ref 13–17)
LDH SERPL L TO P-CCNC: 279 U/L — HIGH (ref 50–242)
MCHC RBC-ENTMCNC: 31.4 PG — SIGNIFICANT CHANGE UP (ref 27–34)
MCHC RBC-ENTMCNC: 34.4 GM/DL — SIGNIFICANT CHANGE UP (ref 32–36)
MCV RBC AUTO: 91.4 FL — SIGNIFICANT CHANGE UP (ref 80–100)
PLATELET # BLD AUTO: 463 K/UL — HIGH (ref 150–400)
POTASSIUM SERPL-MCNC: 3.5 MMOL/L — SIGNIFICANT CHANGE UP (ref 3.5–5.3)
POTASSIUM SERPL-SCNC: 3.5 MMOL/L — SIGNIFICANT CHANGE UP (ref 3.5–5.3)
PROT ?TM UR-MCNC: 37 MG/DL — HIGH (ref 0–12)
PROT SERPL-MCNC: 6 G/DL — SIGNIFICANT CHANGE UP (ref 6–8.3)
PROT/CREAT UR-RTO: 0.5 RATIO — HIGH (ref 0–0.2)
RBC # BLD: 2.57 M/UL — LOW (ref 4.2–5.8)
RBC # FLD: 18 % — HIGH (ref 10.3–14.5)
SODIUM SERPL-SCNC: 134 MMOL/L — LOW (ref 135–145)
WBC # BLD: 8.6 K/UL — SIGNIFICANT CHANGE UP (ref 3.8–10.5)
WBC # FLD AUTO: 8.6 K/UL — SIGNIFICANT CHANGE UP (ref 3.8–10.5)

## 2017-06-30 PROCEDURE — 99233 SBSQ HOSP IP/OBS HIGH 50: CPT | Mod: 25,GC

## 2017-06-30 PROCEDURE — 99233 SBSQ HOSP IP/OBS HIGH 50: CPT | Mod: GC

## 2017-06-30 PROCEDURE — 93750 INTERROGATION VAD IN PERSON: CPT

## 2017-06-30 RX ORDER — WARFARIN SODIUM 2.5 MG/1
1 TABLET ORAL ONCE
Qty: 0 | Refills: 0 | Status: COMPLETED | OUTPATIENT
Start: 2017-06-30 | End: 2017-06-30

## 2017-06-30 RX ADMIN — Medication 40 MILLIGRAM(S): at 17:57

## 2017-06-30 RX ADMIN — Medication 40 MILLIGRAM(S): at 05:33

## 2017-06-30 RX ADMIN — Medication 100 MILLIGRAM(S): at 05:31

## 2017-06-30 RX ADMIN — Medication 100 MILLIGRAM(S): at 12:25

## 2017-06-30 RX ADMIN — CARVEDILOL PHOSPHATE 3.12 MILLIGRAM(S): 80 CAPSULE, EXTENDED RELEASE ORAL at 17:57

## 2017-06-30 RX ADMIN — PANTOPRAZOLE SODIUM 10 MG/HR: 20 TABLET, DELAYED RELEASE ORAL at 00:53

## 2017-06-30 RX ADMIN — AMIODARONE HYDROCHLORIDE 200 MILLIGRAM(S): 400 TABLET ORAL at 05:31

## 2017-06-30 RX ADMIN — CARVEDILOL PHOSPHATE 3.12 MILLIGRAM(S): 80 CAPSULE, EXTENDED RELEASE ORAL at 05:31

## 2017-06-30 RX ADMIN — PANTOPRAZOLE SODIUM 10 MG/HR: 20 TABLET, DELAYED RELEASE ORAL at 12:24

## 2017-06-30 RX ADMIN — QUETIAPINE FUMARATE 50 MILLIGRAM(S): 200 TABLET, FILM COATED ORAL at 21:37

## 2017-06-30 RX ADMIN — Medication 100 MILLIGRAM(S): at 21:37

## 2017-06-30 RX ADMIN — WARFARIN SODIUM 1 MILLIGRAM(S): 2.5 TABLET ORAL at 21:37

## 2017-06-30 RX ADMIN — Medication 0.6 MILLIGRAM(S): at 12:24

## 2017-06-30 NOTE — PROGRESS NOTE ADULT - ASSESSMENT
67 year old man with congestive heart failure with severely reduced LV systolic function due to a nonischemic dilated cardiomyopathy  s/p HM2 LVAD on 6/12,  prolonged intubation.  post-operative course complicated by self-terminating ventricular tachycardia and acute renal failure, which is stable.  He is having persistent SVT, likely atrial tachycardia. His hematocrit is decreased and required 1 unit of pRBCs  today. He has persistent leukocytosis without evidence of infection and completed a course of antibiotics, cultures negative.   Transferred to SDU 6/26  HCT 22 6/26 , s/p prbc x 1,  Protein  in the urine as d/w renal , blood work ordered.  Guiac +, GI consulted, 6/28 EGD cancelled, continue protonix for probable gastric ulcer. Clears  6/30 Advance to Full liquid diet as per GI

## 2017-06-30 NOTE — PROGRESS NOTE ADULT - SUBJECTIVE AND OBJECTIVE BOX
Interval History:    Feeling better, lightheadedness improving.    Medications:  docusate sodium 100 milliGRAM(s) Oral three times a day  amiodarone    Tablet 200 milliGRAM(s) Oral daily  colchicine 0.6 milliGRAM(s) Oral daily  QUEtiapine 50 milliGRAM(s) Oral at bedtime  carvedilol 3.125 milliGRAM(s) Oral every 12 hours  pantoprazole Infusion 8 mG/Hr IV Continuous <Continuous>  furosemide    Tablet 40 milliGRAM(s) Oral two times a day  warfarin 1 milliGRAM(s) Oral once    Vitals:  T(C): 36.7 (06-30-17 @ 08:17), Max: 36.7 (06-30-17 @ 00:00)  HR: 86 (06-30-17 @ 08:17) (77 - 88)  BP(mean): 70 (06-30-17 @ 08:17) (70 - 84)  RR: 18 (06-30-17 @ 08:17) (18 - 18)  SpO2: 96% (06-30-17 @ 05:26) (95% - 97%)      I&O's Summary    29 Jun 2017 07:01  -  30 Jun 2017 07:00  --------------------------------------------------------  IN: 1080 mL / OUT: 1170 mL / NET: -90 mL        Physical Exam:  Appearance: No Acute Distress  HEENT: JVP around 8cm  Cardiovascular: Audible S1 S2, +LVAD hum  Respiratory: Clear to auscultation bilaterally  Gastrointestinal: Soft, Non-tender	  Skin: No cyanosis	  Neurologic: Non-focal  Extremities: No LE edema  Psychiatry: A & O x 3, Mood & affect appropriate    LVAD Interrogation: HM2  Pump Flow: 4.1  Pump Speed: 9200  Pulse Index: 6.4  Pump Power: 5.0  VAD Events: No PI events today, yesterday's PI events noted  Driveline evaluation: C/D/I  Programming Changes: No changes made    Labs:                        8.1    8.6   )-----------( 463      ( 30 Jun 2017 03:46 )             23.5     06-30    134<L>  |  98  |  45<H>  ----------------------------<  75  3.5   |  22  |  1.72<H>    Ca    8.6      30 Jun 2017 03:46    TPro  6.0  /  Alb  2.6<L>  /  TBili  1.6<H>  /  DBili  x   /  AST  55<H>  /  ALT  40  /  AlkPhos  115  06-30    PT/INR - ( 30 Jun 2017 03:46 )   PT: 23.1 sec;   INR: 2.09 ratio         PTT - ( 30 Jun 2017 03:46 )  PTT:36.1 sec    Haptoglobin, Serum: 140 mg/dL (06.30.17 @ 07:28)    Lactate Dehydrogenase, Serum: 279 U/L (06-30 @ 03:46)  Lactate Dehydrogenase, Serum: 302 U/L (06-29 @ 05:07)  Lactate Dehydrogenase, Serum: 386 U/L (06-27 @ 13:25)

## 2017-06-30 NOTE — PROGRESS NOTE ADULT - PROBLEM SELECTOR PLAN 1
Patient appears to be euvolemic today, with improved dizziness   Agree with PO Lasix, would start with once daily and reassess for need for BID

## 2017-06-30 NOTE — PROGRESS NOTE ADULT - PROBLEM SELECTOR PLAN 1
Continue Lasix drip at 10mg/hr /volume overloaded as per Heart Failure team  strict I &O's  Addendum : lasix gtt d/c'd as per CHF team - pt. placed on lasix 40mg po bid.

## 2017-06-30 NOTE — PROGRESS NOTE ADULT - ATTENDING COMMENTS
NORMAN on CKD post LVAD: Likely in the setting of cardiogenic shock   Renal function stable with excellent urine output on lasix drip. This is likely to be his new baseline  Proteinuria of 1.7 gms noted. Await the  SPEP/SIFE. Free light chain ratio is OK  Please recheck Urine Protein/Creatinine ratio  Volume status significantly improved with diuresis. Lasix management per heart failure    Anemia: +stool guaic. On  protonix drip  GI following       Godwin Lucia MD NORMAN on CKD post LVAD: Likely in the setting of cardiogenic shock now resolved  Renal function stable with excellent urine output. This is likely to be his new baseline  Proteinuria of 1.7 gms noted. Please repeat Ur Prot/cr ratio  SPEP/SIFE/ Free light chain ratio unremarkable   Volume status significantly improved with diuresis. Lasix management per heart failure    Anemia: +stool guaic. On  protonix drip         Godwin Lucia MD

## 2017-06-30 NOTE — PROGRESS NOTE ADULT - SUBJECTIVE AND OBJECTIVE BOX
VITAL SIGNS-Tele:  SR 1st degree 80-90  T(C): 36.7 (30 Jun 2017 08:17), Max: 36.7 (30 Jun 2017 00:00)  BP(mean): 68 (30 Jun 2017 12:00) (68 - 84)  SpO2: 96% (30 Jun 2017 05:26) (95% - 96%)            IN: 1080 mL / OUT: 1170 mL / NET: -90 mL  Weight (kg): 75 (11 Jun 2017 15:17)    MEDICATIONS  docusate sodium 100 milliGRAM(s) Oral three times a day  amiodarone    Tablet 200 milliGRAM(s) Oral daily  colchicine 0.6 milliGRAM(s) Oral daily  QUEtiapine 50 milliGRAM(s) Oral at bedtime  carvedilol 3.125 milliGRAM(s) Oral every 12 hours  pantoprazole Infusion 8 mG/Hr IV Continuous <Continuous>  furosemide    Tablet 40 milliGRAM(s) Oral two times a day  warfarin 1 milliGRAM(s) Oral once    PHYSICAL EXAM  Subjective:  Neurology: alert and oriented x 3, nonfocal, no gross deficits  CV : +LVAD sounds  Sternal Wound :  CDI , Stable  Lungs: CTA B/L   Abdomen: soft, nontender, nondistended, positive bowel sounds x 4 quadrants, LBM: 6/29 Driveline site CDI w/ DSG  :     Voiding   Extremities:   B/L LE  Negative calf tenderness; (+) 2 DP palpable, no edema    LABS-6-30  134<L>  |  98  |  45<H>  ----------------------------<  75  3.5   |  22  |  1.72<H>               8.1    8.6   )-----------( 463      ( 30 Jun 2017 03:46 )             23.5   INR: 2.09 ratio       PAST MEDICAL & SURGICAL HISTORY:  Ventricular fibrillation: s/p AICD  PAF (paroxysmal atrial fibrillation): on xarelto  Non-Ischemic Cardiomyopathy  SVT (Supraventricular Tachycardia)  HTN  CHF (Congestive Heart Failure)  Status post left hip replacement  History of Prior Ablation Treatment: for afib  AICD (Automatic Cardioverter/Defibrillator) Present: [employer] with 1 [employer] lead4/1/09- explanted and replaced with [employer] 2 leads on 9/2/09       Discussed with Cardiothoracic Team at AM rounds.

## 2017-06-30 NOTE — PROGRESS NOTE ADULT - SUBJECTIVE AND OBJECTIVE BOX
In today to see Mr. aBez. He was sitting in the chair in NAD.    VAD Education:    Mr. Crocker for the first time today was able to place himself on batteries. He also figured out a way to scroll through the VAD parameters and was able to read all the numbers.  He knows how long his batteries last.  Currently is phone is broken and a new one is being sent, we will ensure he knows how to get in touch with the team  We discussed alarms and what to do if he has an alarm.  We will see him again on Monday.  Discharge plan is to go to subacute rehab hopefully by mid week next week.  Patient still needs to do the discharge test.  All questions answered.

## 2017-06-30 NOTE — PROGRESS NOTE ADULT - SUBJECTIVE AND OBJECTIVE BOX
[facility] DIVISION OF KIDNEY DISEASES AND HYPERTENSION -- [phone]   FOLLOW UP NOTE  --------------------------------------------------------------------------------  Chief Complaint: Heart failure      24hour events/Subjective: pt seen and examined. states he feels good. +mild dizziness in am, now improved. no sob/cp.        PAST HISTORY  --------------------------------------------------------------------------------  No significant changes to PMH, PSH, FHx, SHx, unless otherwise noted    ALLERGIES & MEDICATIONS  --------------------------------------------------------------------------------  Allergies    No Known Allergies    Intolerances      Standing Inpatient Medications  docusate sodium 100 milliGRAM(s) Oral three times a day  amiodarone    Tablet 200 milliGRAM(s) Oral daily  colchicine 0.6 milliGRAM(s) Oral daily  QUEtiapine 50 milliGRAM(s) Oral at bedtime  carvedilol 3.125 milliGRAM(s) Oral every 12 hours  pantoprazole Infusion 8 mG/Hr IV Continuous <Continuous>  furosemide    Tablet 40 milliGRAM(s) Oral two times a day    PRN Inpatient Medications      REVIEW OF SYSTEMS  --------------------------------------------------------------------------------  As above         VITALS/PHYSICAL EXAM  --------------------------------------------------------------------------------  T(C): 36.7 (06-30-17 @ 08:17), Max: 36.7 (06-30-17 @ 00:00)  HR: 86 (06-30-17 @ 08:17) (77 - 88)  BP: --  RR: 18 (06-30-17 @ 08:17) (18 - 18)  SpO2: 96% (06-30-17 @ 05:26) (95% - 97%)  Wt(kg): --        06-29-17 @ 07:01  -  06-30-17 @ 07:00  --------------------------------------------------------  IN: 1080 mL / OUT: 1170 mL / NET: -90 mL      Physical Exam:  	Gen: NAD  	HEENT: MMM  	Pulm: CTA B/L  	CV: +LVAD sound  	Abd: Soft, +BS  	Ext: No LE edema B/L                      Neuro: Awake   	Skin: Warm and Dry   	Other ; no emily    LABS/STUDIES  --------------------------------------------------------------------------------              8.1    8.6   >-----------<  463      [06-30-17 @ 03:46]              23.5     134  |  98  |  45  ----------------------------<  75      [06-30-17 @ 03:46]  3.5   |  22  |  1.72        Ca     8.6     [06-30-17 @ 03:46]    TPro  6.0  /  Alb  2.6  /  TBili  1.6  /  DBili  x   /  AST  55  /  ALT  40  /  AlkPhos  115  [06-30-17 @ 03:46]    PT/INR: PT 23.1 , INR 2.09       [06-30-17 @ 03:46]  PTT: 36.1       [06-30-17 @ 03:46]          [06-30-17 @ 03:46]    Creatinine Trend:  SCr 1.72 [06-30 @ 03:46]  SCr 1.72 [06-29 @ 05:07]  SCr 1.72 [06-28 @ 03:04]  SCr 1.81 [06-27 @ 14:20]  SCr 1.64 [06-27 @ 01:16]    Urinalysis - [06-21-17 @ 18:17]      Color Yellow / Appearance  / SG 1.010 / pH 7.0      Gluc Negative / Ketone Negative  / Bili Negative / Urobili 4       Blood Small / Protein 30 / Leuk Est Moderate / Nitrite Negative      RBC 3-5 / WBC 6-10 / Hyaline  / Gran  / Sq Epi  / Non Sq Epi OCC / Bacteria Few      Iron 20, TIBC 352, %sat 6      [06-08-17 @ 07:14]  Ferritin 121.0      [06-09-17 @ 19:08]  HbA1c 5.5      [06-07-17 @ 06:14]  TSH 2.33      [06-14-17 @ 14:51]  Lipid: chol 62, TG 33, HDL 17, LDL 38      [06-07-17 @ 06:14]      Free Light Chains: kappa 16.30, lambda 11.10, ratio = 1.47      [06-27 @ 15:50]

## 2017-06-30 NOTE — PROGRESS NOTE ADULT - PROBLEM SELECTOR PLAN 1
hemodynamically mediated in setting of decompensated heart failure +/- urinary retention component and hypotension.  Pt stable renal function. (Scr 1.7) Pt with  good urine output.  Monitor BMP  Strict I/O, avoid nephrotoxics, NSAIDs, RCA.

## 2017-06-30 NOTE — PROGRESS NOTE ADULT - PROBLEM SELECTOR PLAN 2
No programming changes.  Continue to hold aspirin and coumadin while evaluating GI bleed, allow INR to come down further  LDH within acceptable range, continues to improve

## 2017-06-30 NOTE — PROGRESS NOTE ADULT - PROBLEM SELECTOR PLAN 2
Pt with proteinuria. Serum free light chain ratio unremarkable. Follow up SPEP. Check repeat Urine TP/Cr.

## 2017-06-30 NOTE — PROGRESS NOTE ADULT - ASSESSMENT
67 year old man with ACC/AHA stage D congestive heart failure with severely reduced LV systolic function due to a nonischemic dilated cardiomyopathy s/p HM2 LVAD on 6/12 with post-operative course complicated by self-terminating ventricular tachycardia and acute renal failure, which is improved and stable. He continues to improve functionally off of milrinone. His anticoagulation has been held due to concern for a GIB and his diuretics were held yesterday for lightheadedness. Today, he is less lightheaded.

## 2017-06-30 NOTE — CHART NOTE - NSCHARTNOTEFT_GEN_A_CORE
Pt with end stage cardiomyopathy, cardiogenic shock s/p LVAD, NORMAN on CKD post LVAD- renal function now stable, melena and Hct drop requiring PRBC, coumadin on hold, persistent leukocytosis, and hand swelling has lessened as per chart.     Source: Patient [ x ]    Family [ ]     other [ x ] Comprehensive chart review     Diet : clear liquids, ensure clear x2    Pt denies nausea/vomiting/diarrhea/constipation, reports last BM passed yesterday.      PO intake:  100% of clear liquids and 1 Ensure Clear. Encouraged consumption of both Ensure Clear daily.      Source for PO intake [ x ] Patient [ ] family [ ] chart [ ] staff [ ] other    Pt only able to state some teach-back points of Coumadin/Vitamin K interaction and nutrition therapy for CHF after being prompted. Pt receptive to further review/reinforcement of diet education. Pt states he is willing to make necessary changes and verbalized understanding of relationship between diet and health/disease.    Admission Weight:160.7pounds  Current Weight: 146.3pounds  Weight fluctuations noted, suspect related to fluid shits. Pt with 1+bilateral leg edema.     Pertinent Medications: MEDICATIONS  (STANDING):  docusate sodium 100 milliGRAM(s) Oral three times a day  amiodarone    Tablet 200 milliGRAM(s) Oral daily  colchicine 0.6 milliGRAM(s) Oral daily  QUEtiapine 50 milliGRAM(s) Oral at bedtime  carvedilol 3.125 milliGRAM(s) Oral every 12 hours  pantoprazole Infusion 8 mG/Hr (10 mL/Hr) IV Continuous <Continuous>  furosemide    Tablet 40 milliGRAM(s) Oral two times a day  Coumadin- dosed daily      Pertinent Labs:    Hemoglobin: 8.1 g/dL (06.30.17 @ 03:46) - low  Hematocrit: 23.5 % (06.30.17 @ 03:46) - low  Comprehensive Metabolic Panel (06.30.17 @ 03:46)    Sodium, Serum: 134 mmol/L -low    Potassium, Serum: 3.5 mmol/L    Chloride, Serum: 98 mmol/L    Carbon Dioxide, Serum: 22 mmol/L    Anion Gap, Serum: 14 mmol/L    Blood Urea Nitrogen, Serum: 45 mg/dL - high    Creatinine, Serum: 1.72 mg/dL - high    Glucose, Serum: 75 mg/dL    Calcium, Total Serum: 8.6 mg/dL    Protein Total, Serum: 6.0 g/dL    Albumin, Serum: 2.6 g/dL - low    Bilirubin Total, Serum: 1.6 mg/dL - high    Alkaline Phosphatase, Serum: 115 U/L    Aspartate Aminotransferase (AST/SGOT): 55 U/L - high    Alanine Aminotransferase (ALT/SGPT): 40 U/L RC  Lactate Dehydrogenase, Serum: 279 U/L (06.30.17 @ 03:46) - high  Fingersticks: (6/29)88mg/dL      Skin: No pressure ulcers noted.    Estimated Needs:   [ x ] no change since previous assessment  [ ] recalculated:       Previous Nutrition Diagnosis:   Malnutrition   Food and nutrition related knowledge deficit       Nutrition Diagnosis is [ x ] ongoing  [ ] resolved [ ] not applicable   Food and nutrition related knowledge deficit being addressed with continued diet education and reinforcement.        New Nutrition Diagnosis: [ x ] not applicable     Interventions:   1. When medically feasible recommend advance diet to regular, low sodium.  2. Once diet advanced to solids recommend changing Ensure Clear to Ensure Enlive 2 cans/day.  3. Encourage PO intake with all meals.  4. Provide food preferences within therapeutic diet when requested.   5. Encourage use of alternate menu items as needed.  6. Reviewed/reinforced nutrition therapy for CHF and Coumadin/Vitamin K interaction.      Monitoring and Evaluation:     [ x ] PO intake [ x ] Tolerance to diet prescription [ x ] weights [ x ] follow up per protocol    [ ] other:

## 2017-06-30 NOTE — PROGRESS NOTE ADULT - PROBLEM SELECTOR PLAN 3
in setting of HF/RF. Agree with PO lasix 40mg BID. Monitor weight. in setting of HF/RF. Diuretic management per Heart failure team. Monitor weight.

## 2017-07-01 LAB
ANION GAP SERPL CALC-SCNC: 14 MMOL/L — SIGNIFICANT CHANGE UP (ref 5–17)
BUN SERPL-MCNC: 38 MG/DL — HIGH (ref 7–23)
CALCIUM SERPL-MCNC: 8.2 MG/DL — LOW (ref 8.4–10.5)
CHLORIDE SERPL-SCNC: 97 MMOL/L — SIGNIFICANT CHANGE UP (ref 96–108)
CO2 SERPL-SCNC: 22 MMOL/L — SIGNIFICANT CHANGE UP (ref 22–31)
CREAT SERPL-MCNC: 1.52 MG/DL — HIGH (ref 0.5–1.3)
GLUCOSE SERPL-MCNC: 85 MG/DL — SIGNIFICANT CHANGE UP (ref 70–99)
HCT VFR BLD CALC: 23.9 % — LOW (ref 39–50)
HGB BLD-MCNC: 7.4 G/DL — LOW (ref 13–17)
INR BLD: 2.02 RATIO — HIGH (ref 0.88–1.16)
LDH SERPL L TO P-CCNC: 271 U/L — HIGH (ref 50–242)
MCHC RBC-ENTMCNC: 28.5 PG — SIGNIFICANT CHANGE UP (ref 27–34)
MCHC RBC-ENTMCNC: 31.2 GM/DL — LOW (ref 32–36)
MCV RBC AUTO: 91.2 FL — SIGNIFICANT CHANGE UP (ref 80–100)
PLATELET # BLD AUTO: 447 K/UL — HIGH (ref 150–400)
POTASSIUM SERPL-MCNC: 3.5 MMOL/L — SIGNIFICANT CHANGE UP (ref 3.5–5.3)
POTASSIUM SERPL-SCNC: 3.5 MMOL/L — SIGNIFICANT CHANGE UP (ref 3.5–5.3)
PROTHROM AB SERPL-ACNC: 22.3 SEC — HIGH (ref 9.8–12.7)
RBC # BLD: 2.62 M/UL — LOW (ref 4.2–5.8)
RBC # FLD: 18.3 % — HIGH (ref 10.3–14.5)
SODIUM SERPL-SCNC: 133 MMOL/L — LOW (ref 135–145)
WBC # BLD: 9.3 K/UL — SIGNIFICANT CHANGE UP (ref 3.8–10.5)
WBC # FLD AUTO: 9.3 K/UL — SIGNIFICANT CHANGE UP (ref 3.8–10.5)

## 2017-07-01 PROCEDURE — 93750 INTERROGATION VAD IN PERSON: CPT

## 2017-07-01 PROCEDURE — 99233 SBSQ HOSP IP/OBS HIGH 50: CPT | Mod: 25,GC

## 2017-07-01 RX ORDER — WARFARIN SODIUM 2.5 MG/1
2 TABLET ORAL ONCE
Qty: 0 | Refills: 0 | Status: COMPLETED | OUTPATIENT
Start: 2017-07-01 | End: 2017-07-01

## 2017-07-01 RX ADMIN — Medication 0.6 MILLIGRAM(S): at 13:25

## 2017-07-01 RX ADMIN — AMIODARONE HYDROCHLORIDE 200 MILLIGRAM(S): 400 TABLET ORAL at 06:26

## 2017-07-01 RX ADMIN — QUETIAPINE FUMARATE 50 MILLIGRAM(S): 200 TABLET, FILM COATED ORAL at 23:08

## 2017-07-01 RX ADMIN — WARFARIN SODIUM 2 MILLIGRAM(S): 2.5 TABLET ORAL at 23:12

## 2017-07-01 RX ADMIN — Medication 40 MILLIGRAM(S): at 06:26

## 2017-07-01 RX ADMIN — CARVEDILOL PHOSPHATE 3.12 MILLIGRAM(S): 80 CAPSULE, EXTENDED RELEASE ORAL at 06:26

## 2017-07-01 RX ADMIN — PANTOPRAZOLE SODIUM 10 MG/HR: 20 TABLET, DELAYED RELEASE ORAL at 08:08

## 2017-07-01 RX ADMIN — Medication 40 MILLIGRAM(S): at 18:11

## 2017-07-01 RX ADMIN — CARVEDILOL PHOSPHATE 3.12 MILLIGRAM(S): 80 CAPSULE, EXTENDED RELEASE ORAL at 18:11

## 2017-07-01 NOTE — PROGRESS NOTE ADULT - SUBJECTIVE AND OBJECTIVE BOX
Interval History:    dark stools overnight, continued on protonix gtt and po lasix, INR therapeutic    Medications:  docusate sodium 100 milliGRAM(s) Oral three times a day  amiodarone    Tablet 200 milliGRAM(s) Oral daily  colchicine 0.6 milliGRAM(s) Oral daily  QUEtiapine 50 milliGRAM(s) Oral at bedtime  carvedilol 3.125 milliGRAM(s) Oral every 12 hours  pantoprazole Infusion 8 mG/Hr IV Continuous <Continuous>  furosemide    Tablet 40 milliGRAM(s) Oral two times a day    Vitals:  T(C): 36.7 (17 @ 07:38), Max: 37 (17 @ 15:00)  HR: 82 (17 @ 07:38) (79 - 91)  BP: --  BP(mean): 70 (17 @ 07:38) (68 - 80)  ABP: --  ABP(mean): --  RR: 18 (17 @ 07:38) (18 - 19)  SpO2: 94% (17 @ 07:38) (94% - 99%)  Wt(kg): --    Daily     Daily Weight in k (2017 09:00)        I&O's Summary    :  -  2017 07:00  --------------------------------------------------------  IN: 2230 mL / OUT: 2075 mL / NET: 155 mL    2017 07:  -  2017 11:23  --------------------------------------------------------  IN: 0 mL / OUT: 250 mL / NET: -250 mL        Physical Exam:  Appearance: No Acute Distress  HEENT: minimal JVD  Cardiovascular: Normal S1 S2, LVAD hum  Respiratory: Clear to auscultation bilaterally  Gastrointestinal: Soft, Non-tender	  Skin: No cyanosis	  Neurologic: Non-focal  Extremities: No LE edema  Psychiatry: A & O x 3, Mood & affect appropriate    LVAD Interrogation:  Pump Flow: 3.8-4.2  Pump Speed: 9200  Pulse Index: 4.8-5.6  Pump Power: 4.8-5.8  VAD Events: 2 PI events, no power spikes  Driveline evaluation:  c/d/i  Programming Changes: No changes made    Labs:                        7.4    9.3   )-----------( 447      ( 2017 03:48 )             23.9         133<L>  |  97  |  38<H>  ----------------------------<  85  3.5   |  22  |  1.52<H>    Ca    8.2<L>      2017 03:48    TPro  6.0  /  Alb  2.6<L>  /  TBili  1.6<H>  /  DBili  x   /  AST  55<H>  /  ALT  40  /  AlkPhos  115  30    PT/INR - ( 2017 03:48 )   PT: 22.3 sec;   INR: 2.02 ratio         PTT - ( 2017 03:46 )  PTT:36.1 sec          Lactate Dehydrogenase, Serum: 271 U/L ( @ 03:48)  Lactate Dehydrogenase, Serum: 279 U/L ( @ 03:46)  Lactate Dehydrogenase, Serum: 302 U/L ( @ 05:07)          TELEMETRY:    [ ] Echocardiogram:

## 2017-07-01 NOTE — PROGRESS NOTE ADULT - ATTENDING COMMENTS
Dose Coumadin, aiming for INR 2.0.  Await GI input on whether to repeat endoscopy.  Continue PO Lasix.

## 2017-07-01 NOTE — PROGRESS NOTE ADULT - SUBJECTIVE AND OBJECTIVE BOX
VITAL SIGNS    Telemetry: SR 70-90   Vital Signs Last 24 Hrs  T(C): 36.7 (17 @ 07:38), Max: 37 (17 @ 15:00)  T(F): 98 (17 @ 07:38), Max: 98.6 (17 @ 15:00)  HR: 82 (17 @ 07:38) (79 - 91)  BP: --  RR: 18 (17 @ 07:38) (18 - 19)  SpO2: 94% (17 @ 07:38) (94% - 99%)             @ :  -   @ 07:00  --------------------------------------------------------  IN: 2230 mL / OUT: 2075 mL / NET: 155 mL     @ :  -   @ 11:28  --------------------------------------------------------  IN: 0 mL / OUT: 250 mL / NET: -250 mL       Daily     Daily Weight in k (2017 09:00)  Admit Wt: Drug Dosing Weight  Height (cm): 172.72 (2017 17:13)  Weight (kg): 75 (2017 15:17)  BMI (kg/m2): 25.1 (2017 17:13)  BSA (m2): 1.89 (2017 17:13)      CAPILLARY BLOOD GLUCOSE                 MEDICATIONS  docusate sodium 100 milliGRAM(s) Oral three times a day  amiodarone    Tablet 200 milliGRAM(s) Oral daily  colchicine 0.6 milliGRAM(s) Oral daily  QUEtiapine 50 milliGRAM(s) Oral at bedtime  carvedilol 3.125 milliGRAM(s) Oral every 12 hours  pantoprazole Infusion 8 mG/Hr IV Continuous <Continuous>  furosemide    Tablet 40 milliGRAM(s) Oral two times a day      PHYSICAL EXAM    Subjective: No complaints offered. " I want to walk with PT"  Neurology: alert and oriented x 3, nonfocal, no gross deficits  CV : LVAD sounds  Sternal Wound :  CDI , Stable  Lungs: CTA b/l  Abdomen: soft, NT,ND, (+ )BM drive line c/d/i  :  voiding  Extremities: -/c/c/e      FLOW 4.3  Speed 9200  PI 5.1  Pump Power 4.9  No events      LABS      133<L>  |  97  |  38<H>  ----------------------------<  85  3.5   |  22  |  1.52<H>    Ca    8.2<L>      2017 03:48    TPro  6.0  /  Alb  2.6<L>  /  TBili  1.6<H>  /  DBili  x   /  AST  55<H>  /  ALT  40  /  AlkPhos  115  06-30                                 7.4    9.3   )-----------( 447      ( 2017 03:48 )             23.9          PT/INR - ( 2017 03:48 )   PT: 22.3 sec;   INR: 2.02 ratio         PTT - ( 2017 03:46 )  PTT:36.1 sec       PAST MEDICAL & SURGICAL HISTORY:  Ventricular fibrillation: s/p AICD  PAF (paroxysmal atrial fibrillation): on xarelto  Non-Ischemic Cardiomyopathy  SVT (Supraventricular Tachycardia)  HTN  CHF (Congestive Heart Failure)  Status post left hip replacement  History of Prior Ablation Treatment: for afib  AICD (Automatic Cardioverter/Defibrillator) Present: [employer] with 1 [employer] lead09- explanted and replaced with Medtronic 2 leads on 09 VITAL SIGNS    Telemetry: SR 70-90   Vital Signs Last 24 Hrs  T(C): 36.7 (17 @ 07:38), Max: 37 (17 @ 15:00)  T(F): 98 (17 @ 07:38), Max: 98.6 (17 @ 15:00)  HR: 82 (17 @ 07:38) (79 - 91)  BP: --  RR: 18 (17 @ 07:38) (18 - 19)  SpO2: 94% (17 @ 07:38) (94% - 99%)             @ :  -   @ 07:00  --------------------------------------------------------  IN: 2230 mL / OUT: 2075 mL / NET: 155 mL     @ :  -   @ 11:28  --------------------------------------------------------  IN: 0 mL / OUT: 250 mL / NET: -250 mL       Daily     Daily Weight in k (2017 09:00)  Admit Wt: Drug Dosing Weight  Height (cm): 172.72 (2017 17:13)  Weight (kg): 75 (2017 15:17)  BMI (kg/m2): 25.1 (2017 17:13)  BSA (m2): 1.89 (2017 17:13)      CAPILLARY BLOOD GLUCOSE                 MEDICATIONS  docusate sodium 100 milliGRAM(s) Oral three times a day  amiodarone    Tablet 200 milliGRAM(s) Oral daily  colchicine 0.6 milliGRAM(s) Oral daily  QUEtiapine 50 milliGRAM(s) Oral at bedtime  carvedilol 3.125 milliGRAM(s) Oral every 12 hours  pantoprazole Infusion 8 mG/Hr IV Continuous <Continuous>  furosemide    Tablet 40 milliGRAM(s) Oral two times a day      PHYSICAL EXAM    Subjective: No complaints offered. " I want to walk with PT"  Neurology: alert and oriented x 3, nonfocal, no gross deficits  CV : LVAD sounds  Drive line c/d/i  Lungs: CTA b/l  Abdomen: soft, NT,ND, (+ )BM drive line c/d/i  :  voiding  Extremities: -/c/c/+1 edema      FLOW 4.3  Speed 9200  PI 5.1  Pump Power 4.9  No events      LABS      133<L>  |  97  |  38<H>  ----------------------------<  85  3.5   |  22  |  1.52<H>    Ca    8.2<L>      2017 03:48    TPro  6.0  /  Alb  2.6<L>  /  TBili  1.6<H>  /  DBili  x   /  AST  55<H>  /  ALT  40  /  AlkPhos  115  06-30                                 7.4    9.3   )-----------( 447      ( 2017 03:48 )             23.9          PT/INR - ( 2017 03:48 )   PT: 22.3 sec;   INR: 2.02 ratio         PTT - ( 2017 03:46 )  PTT:36.1 sec       PAST MEDICAL & SURGICAL HISTORY:  Ventricular fibrillation: s/p AICD  PAF (paroxysmal atrial fibrillation): on xarelto  Non-Ischemic Cardiomyopathy  SVT (Supraventricular Tachycardia)  HTN  CHF (Congestive Heart Failure)  Status post left hip replacement  History of Prior Ablation Treatment: for afib  AICD (Automatic Cardioverter/Defibrillator) Present: [employer] with 1 [employer] lead09- explanted and replaced with Medtronic 2 leads on 09

## 2017-07-01 NOTE — PROGRESS NOTE ADULT - PROBLEM SELECTOR PLAN 2
No programming changes.  Continue to hold aspirin and cont low dose coumadin coumadin while evaluating GI bleed

## 2017-07-01 NOTE — PROGRESS NOTE ADULT - ASSESSMENT
67 year old male s/p HM2 LVAD on 6/12   post op   prolonged intubation ,SVT and acute renal failure, which is improved and stable.   6/26 1 u prbc ,+guaic  6/27 1u prbc transfused

## 2017-07-01 NOTE — PROGRESS NOTE ADULT - ASSESSMENT
67 year old man with ACC/AHA stage D congestive heart failure with severely reduced LV systolic function due to a nonischemic dilated cardiomyopathy s/p HM2 LVAD on 6/12 with post-operative course complicated by self-terminating ventricular tachycardia and acute renal failure, which is improved and stable. He continues to improve functionally off of milrinone. His INR is therapeutic w dark stools  and on po diuretics

## 2017-07-02 LAB
ALBUMIN SERPL ELPH-MCNC: 2.4 G/DL — LOW (ref 3.3–5)
ALP SERPL-CCNC: 141 U/L — HIGH (ref 40–120)
ALT FLD-CCNC: 33 U/L RC — SIGNIFICANT CHANGE UP (ref 10–45)
ANION GAP SERPL CALC-SCNC: 14 MMOL/L — SIGNIFICANT CHANGE UP (ref 5–17)
AST SERPL-CCNC: 50 U/L — HIGH (ref 10–40)
BILIRUB SERPL-MCNC: 1.2 MG/DL — SIGNIFICANT CHANGE UP (ref 0.2–1.2)
BLD GP AB SCN SERPL QL: NEGATIVE — SIGNIFICANT CHANGE UP
BUN SERPL-MCNC: 39 MG/DL — HIGH (ref 7–23)
CALCIUM SERPL-MCNC: 8.1 MG/DL — LOW (ref 8.4–10.5)
CHLORIDE SERPL-SCNC: 97 MMOL/L — SIGNIFICANT CHANGE UP (ref 96–108)
CO2 SERPL-SCNC: 22 MMOL/L — SIGNIFICANT CHANGE UP (ref 22–31)
CREAT SERPL-MCNC: 1.4 MG/DL — HIGH (ref 0.5–1.3)
GLUCOSE SERPL-MCNC: 112 MG/DL — HIGH (ref 70–99)
HCT VFR BLD CALC: 19.9 % — CRITICAL LOW (ref 39–50)
HCT VFR BLD CALC: 21.1 % — LOW (ref 39–50)
HGB BLD-MCNC: 6.8 G/DL — CRITICAL LOW (ref 13–17)
HGB BLD-MCNC: 7 G/DL — CRITICAL LOW (ref 13–17)
INR BLD: 1.88 RATIO — HIGH (ref 0.88–1.16)
LDH SERPL L TO P-CCNC: 262 U/L — HIGH (ref 50–242)
MCHC RBC-ENTMCNC: 30.5 PG — SIGNIFICANT CHANGE UP (ref 27–34)
MCHC RBC-ENTMCNC: 31.6 PG — SIGNIFICANT CHANGE UP (ref 27–34)
MCHC RBC-ENTMCNC: 33 GM/DL — SIGNIFICANT CHANGE UP (ref 32–36)
MCHC RBC-ENTMCNC: 34 GM/DL — SIGNIFICANT CHANGE UP (ref 32–36)
MCV RBC AUTO: 92.6 FL — SIGNIFICANT CHANGE UP (ref 80–100)
MCV RBC AUTO: 92.8 FL — SIGNIFICANT CHANGE UP (ref 80–100)
PLATELET # BLD AUTO: 398 K/UL — SIGNIFICANT CHANGE UP (ref 150–400)
PLATELET # BLD AUTO: 413 K/UL — HIGH (ref 150–400)
POTASSIUM SERPL-MCNC: 3.6 MMOL/L — SIGNIFICANT CHANGE UP (ref 3.5–5.3)
POTASSIUM SERPL-SCNC: 3.6 MMOL/L — SIGNIFICANT CHANGE UP (ref 3.5–5.3)
PROT SERPL-MCNC: 5.5 G/DL — LOW (ref 6–8.3)
PROTHROM AB SERPL-ACNC: 20.8 SEC — HIGH (ref 9.8–12.7)
RBC # BLD: 2.14 M/UL — LOW (ref 4.2–5.8)
RBC # BLD: 2.28 M/UL — LOW (ref 4.2–5.8)
RBC # FLD: 18.7 % — HIGH (ref 10.3–14.5)
RBC # FLD: 18.8 % — HIGH (ref 10.3–14.5)
RH IG SCN BLD-IMP: POSITIVE — SIGNIFICANT CHANGE UP
SODIUM SERPL-SCNC: 133 MMOL/L — LOW (ref 135–145)
WBC # BLD: 11.8 K/UL — HIGH (ref 3.8–10.5)
WBC # BLD: 12.2 K/UL — HIGH (ref 3.8–10.5)
WBC # FLD AUTO: 11.8 K/UL — HIGH (ref 3.8–10.5)
WBC # FLD AUTO: 12.2 K/UL — HIGH (ref 3.8–10.5)

## 2017-07-02 PROCEDURE — 71010: CPT | Mod: 26

## 2017-07-02 PROCEDURE — 99233 SBSQ HOSP IP/OBS HIGH 50: CPT | Mod: GC

## 2017-07-02 RX ORDER — ENOXAPARIN SODIUM 100 MG/ML
80 INJECTION SUBCUTANEOUS DAILY
Qty: 0 | Refills: 0 | Status: DISCONTINUED | OUTPATIENT
Start: 2017-07-02 | End: 2017-07-02

## 2017-07-02 RX ORDER — ENOXAPARIN SODIUM 100 MG/ML
80 INJECTION SUBCUTANEOUS
Qty: 0 | Refills: 0 | Status: DISCONTINUED | OUTPATIENT
Start: 2017-07-02 | End: 2017-07-02

## 2017-07-02 RX ORDER — DIPHENHYDRAMINE HCL 50 MG
25 CAPSULE ORAL ONCE
Qty: 0 | Refills: 0 | Status: COMPLETED | OUTPATIENT
Start: 2017-07-02 | End: 2017-07-02

## 2017-07-02 RX ORDER — WARFARIN SODIUM 2.5 MG/1
2.5 TABLET ORAL ONCE
Qty: 0 | Refills: 0 | Status: COMPLETED | OUTPATIENT
Start: 2017-07-02 | End: 2017-07-02

## 2017-07-02 RX ORDER — ENOXAPARIN SODIUM 100 MG/ML
40 INJECTION SUBCUTANEOUS DAILY
Qty: 0 | Refills: 0 | Status: DISCONTINUED | OUTPATIENT
Start: 2017-07-02 | End: 2017-07-03

## 2017-07-02 RX ADMIN — QUETIAPINE FUMARATE 50 MILLIGRAM(S): 200 TABLET, FILM COATED ORAL at 21:27

## 2017-07-02 RX ADMIN — CARVEDILOL PHOSPHATE 3.12 MILLIGRAM(S): 80 CAPSULE, EXTENDED RELEASE ORAL at 06:11

## 2017-07-02 RX ADMIN — Medication 0.6 MILLIGRAM(S): at 13:41

## 2017-07-02 RX ADMIN — PANTOPRAZOLE SODIUM 10 MG/HR: 20 TABLET, DELAYED RELEASE ORAL at 07:40

## 2017-07-02 RX ADMIN — WARFARIN SODIUM 2.5 MILLIGRAM(S): 2.5 TABLET ORAL at 21:27

## 2017-07-02 RX ADMIN — Medication 40 MILLIGRAM(S): at 16:53

## 2017-07-02 RX ADMIN — ENOXAPARIN SODIUM 40 MILLIGRAM(S): 100 INJECTION SUBCUTANEOUS at 18:32

## 2017-07-02 RX ADMIN — PANTOPRAZOLE SODIUM 10 MG/HR: 20 TABLET, DELAYED RELEASE ORAL at 21:27

## 2017-07-02 RX ADMIN — AMIODARONE HYDROCHLORIDE 200 MILLIGRAM(S): 400 TABLET ORAL at 06:11

## 2017-07-02 RX ADMIN — Medication 40 MILLIGRAM(S): at 06:11

## 2017-07-02 RX ADMIN — CARVEDILOL PHOSPHATE 3.12 MILLIGRAM(S): 80 CAPSULE, EXTENDED RELEASE ORAL at 16:53

## 2017-07-02 NOTE — PROVIDER CONTACT NOTE (CRITICAL VALUE NOTIFICATION) - NAME OF MD/NP/PA/DO NOTIFIED:
Claudia BRAVO
Colette BRAVO
Dana Shine , NP
MD Alvarado
MD Saalzar
Mechelle Valerio MD, Claudia BRAVO
Raissa Flor,NP
SHELLY Rush
SONDRA Baxter NP
Sahil JOSEPH and Neva WARREN NP
Tomy Rush
Yoanna Senior, NP
md rodriguez, donte ambriz
md truong & Norbert ventura
[facility] Silvano August
Dr. Deshpande

## 2017-07-02 NOTE — PROVIDER CONTACT NOTE (OTHER) - ASSESSMENT
VSS. Pt. denies no SOB, no patient or any other discomfort. Patient skin assess, no redness or lump noted.

## 2017-07-02 NOTE — PROGRESS NOTE ADULT - SUBJECTIVE AND OBJECTIVE BOX
Interval History: Patient feels well.  Some dizziness upon standing but overall improved. Had BM yesterday but unsure as to whether there was blood or not.  H/h still downtrending    Medications:  docusate sodium 100 milliGRAM(s) Oral three times a day  amiodarone    Tablet 200 milliGRAM(s) Oral daily  colchicine 0.6 milliGRAM(s) Oral daily  QUEtiapine 50 milliGRAM(s) Oral at bedtime  carvedilol 3.125 milliGRAM(s) Oral every 12 hours  pantoprazole Infusion 8 mG/Hr IV Continuous <Continuous>  furosemide    Tablet 40 milliGRAM(s) Oral two times a day  enoxaparin Injectable 80 milliGRAM(s) SubCutaneous two times a day    Vitals:  T(C): 36.6 (17 @ 07:34), Max: 36.7 (17 @ 16:14)  HR: 84 (17 @ 07:34) (83 - 94)  BP(mean): 80 (17 @ 07:34) (76 - 84)  RR: 18 (17 @ 07:34) (18 - 18)  SpO2: 95% (17 @ 07:34) (95% - 100%)    Daily     Daily Weight in k.5 (2017 11:00)        I&O's Summary    2017 07:01  -  2017 07:00  --------------------------------------------------------  IN: 1030 mL / OUT: 1050 mL / NET: -20 mL        Physical Exam:  Appearance: No Acute Distress  HEENT: No JVD  Cardiovascular: Normal S1 S2, No murmurs/rubs/gallops, +LVAD hum  Respiratory: Clear to auscultation bilaterally  Gastrointestinal: Soft, Non-tender	  Skin: No cyanosis	  Neurologic: Non-focal  Extremities: No LE edema  Psychiatry: A & O x 3, Mood & affect appropriate    LVAD Interrogation:  Pump Flow: 4.0  Pump Speed: 9200   Pulse Index: 6.8  Pump Power: 4.9  VAD Events: No events  Driveline evaluation:    Programming Changes: No changes made    Labs:                        7.0    12.2  )-----------( 398      ( 2017 03:22 )             21.1         133<L>  |  97  |  39<H>  ----------------------------<  112<H>  3.6   |  22  |  1.40<H>    Ca    8.1<L>      2017 03:22    TPro  5.5<L>  /  Alb  2.4<L>  /  TBili  1.2  /  DBili  x   /  AST  50<H>  /  ALT  33  /  AlkPhos  141<H>      PT/INR - ( 2017 03:22 )   PT: 20.8 sec;   INR: 1.88 ratio                   Lactate Dehydrogenase, Serum: 262 U/L ( @ 03:22)  Lactate Dehydrogenase, Serum: 271 U/L ( @ 03:48)  Lactate Dehydrogenase, Serum: 279 U/L ( @ 03:46)          TELEMETRY:    [ ] Echocardiogram:

## 2017-07-02 NOTE — PROGRESS NOTE ADULT - PROBLEM SELECTOR PLAN 2
No programming changes. Lovenox bridge to coumadin   Continue to hold aspirin and cont low dose coumadin while evaluating GI bleed

## 2017-07-02 NOTE — PROVIDER CONTACT NOTE (CRITICAL VALUE NOTIFICATION) - RECOMMENDATIONS
Continue to monitor
monitor for signs of bleeding, give platelet if symptomatic
prbc
ABG drawn, 1unit prbc ordered
Continue to monitor.
Monitor other labs that could attributed to low Calcium.
PRBC transfusion
PRBC transfusion
Transfusion.
monitor for s/s bleeding. d/c xarelto
repeat CBC, will give platelet infusion if still low

## 2017-07-02 NOTE — PROGRESS NOTE ADULT - SUBJECTIVE AND OBJECTIVE BOX
Subjective: hello no acute distress ambulating in hallway oob in chair    VITAL SIGNS  Vital Signs Last 24 Hrs  T(C): 36.7 (17 @ 11:52), Max: 36.7 (17 @ 16:14)    HR: 84 (17 @ 12:23) (82 - 94)  MAP 78  RR: 18 (17 @ 11:52) (18 - 18)  SpO2: 97% (17 @ 11:52) (95% - 100%)  on (O2)      LVAD   RPM 9200 flow 4.8 power 5.2 PI 5.8  Telemetry/Alarms: NSR  8 beat WCT    MEDICATIONS  docusate sodium 100 milliGRAM(s) Oral three times a day  amiodarone    Tablet 200 milliGRAM(s) Oral daily  colchicine 0.6 milliGRAM(s) Oral daily  QUEtiapine 50 milliGRAM(s) Oral at bedtime  carvedilol 3.125 milliGRAM(s) Oral every 12 hours  pantoprazole Infusion 8 mG/Hr IV Continuous <Continuous>  furosemide    Tablet 40 milliGRAM(s) Oral two times a day  enoxaparin Injectable 80 milliGRAM(s) SubCutaneous two times a day  warfarin 2.5 milliGRAM(s) Oral once      PHYSICAL EXAM  General: well nourished, well developed, no acute distress  Neurology: alert and oriented x 3, nonfocal, no gross deficits  Respiratory: clear to auscultation bilaterally  CV: regular rate and rhythm, normal LVAD sounds Drive lne CDI  Abdomen: soft, nontender, nondistended, positive bowel sounds, last bowel movement   Extremities: warm, well perfused.  + DP pulses  Incisions: midline sternal incision, NAZ  sternum stable.     @ :  -   @ 07:00  --------------------------------------------------------  IN: 1030 mL / OUT: 1050 mL / NET: -20 mL     @ :  -   @ 13:59  --------------------------------------------------------  IN: 0 mL / OUT: 300 mL / NET: -300 mL          Daily Weight in k.5 (2017 11:00)  Weight (kg): 75 (2017 15:17)      LABS      133<L>  |  97  |  39<H>  ----------------------------<  112<H>  3.6   |  22  |  1.40<H>    Ca    8.1<L>      2017 03:22    TPro  5.5<L>  /  Alb  2.4<L>  /  TBili  1.2  /  DBili  x   /  AST  50<H>  /  ALT  33  /  AlkPhos  141<H>                                   7.0    12.2  )-----------( 398      ( 2017 03:22 )             21.1          PT/INR - ( 2017 03:22 )   PT: 20.8 sec;   INR: 1.88 ratio           Bilirubin Total, Serum: 1.2 mg/dL ( @ 03:2  PAST MEDICAL & SURGICAL HISTORY:  Ventricular fibrillation: s/p AICD  PAF (paroxysmal atrial fibrillation): on xarelto  Non-Ischemic Cardiomyopathy  SVT (Supraventricular Tachycardia)  HTN  CHF (Congestive Heart Failure)  Status post left hip replacement  History of Prior Ablation Treatment: for afib  AICD (Automatic Cardioverter/Defibrillator) Present: [employer] with 1 [employer] lead09- explanted and replaced with Medtronic 2 leads on 09       Postoperative Course/Issues:     PLAN  Neuro: Pain management  Pulm: Encourage coughing, deep breathing and use of incentive spirometry. .   Cardio: Continue Aspirin, Lipitor. COREG coumadin   GI: Protonix gtt  stoolsoftners EGD in am NPo at midnight  Renal: Keep negative fluid balance. (Diuretics) Trend BUN/Cr. Supplement electrolytes prn.   : voiding  Vasc: Lovenox.CoumadinVT prophylaxis  Heme: . Trend CBC daily.   LVAD teaching  and return demonstration  Therapy: PT daily as tolerated.  :Disposition: Aim to D/C to home on when medically stable   Discussed with Cardiothoracic Team at AM rounds.

## 2017-07-02 NOTE — PROVIDER CONTACT NOTE (CRITICAL VALUE NOTIFICATION) - TEST AND RESULT REPORTED:
H&H 7.2 & 22
H/H 22.5/7.2
H/H 6.8/19.9
Hemglobin 7.0
Hemoglobin 6.4, Hematocrit 20.2
Hemoglobin 6.8, Hematocrit 20.2
INR 5.89
INR 7.09
PTT > 200
Platelets 19
Platelets 20
Serum Calcium 6.1
Stool Occult blood positive
Stool occult blood positive
inr 7.16
Hgb 6.8/Hct 21.3

## 2017-07-02 NOTE — PROGRESS NOTE ADULT - PROBLEM SELECTOR PLAN 1
C/w amiodarone, po diuretics, coreg, colchicine and protonix gtt  monitor H/H  anticoagulation  daily LDH/CBC/INR

## 2017-07-02 NOTE — PROVIDER CONTACT NOTE (CRITICAL VALUE NOTIFICATION) - SITUATION
H/H 6.8/19.9
Hemoglobin 6.4, Hematocrit 20.2 on ABG
Hemoglobin 6.8, Hematocrit 20.2
Hemoglobin 7.0, (also informed of Hematocrit 21.1)
Pt s/p LVAD, pt on heparin drip.
Pt s/p lvad insertion
platelets 19
pt on milrinone and lasix gtt, takes xarelto for paf
pt on milrinone, lasix gtt. takes xarelto for PAF.
pt prior INR 7
s/p LVAD on 6.12.17. patient is hypotensive- MAP 54 vasopressin started, CVP 10, PA pressure 32/10. patient is urinating 200-300cc/hr.  PUMP flow on vad decreases from 4.5 to 2.9
test as reported above
test reported as above
H&H decreasing
platlet 20

## 2017-07-02 NOTE — PROVIDER CONTACT NOTE (CRITICAL VALUE NOTIFICATION) - PERSON GIVING RESULT:
Alex
Alex
Anne Merling
Jeovany Paran
Josef Boswell
Josef Obregon
Logan Galloway-Hematolgy
Logan Sun, STAT lab
Madeline Garcia
Margi Waller
[facility]
Tyrel Solo / Monica
conner paran
stat 
Janet Renner/stat lab

## 2017-07-02 NOTE — PROVIDER CONTACT NOTE (CRITICAL VALUE NOTIFICATION) - BACKGROUND
6/12 LVAD (LV Dysfunction, (EF 15-20%); 6/15 Neuro changes - EEG Negative; 6/26 Low H/H -> 1U PRBC; 6/27 1U PRBC 2/2 + GIB.

## 2017-07-02 NOTE — PROGRESS NOTE ADULT - ASSESSMENT
67 year old male s/p HM2 LVAD on 6/12   post op   prolonged intubation ,SVT and acute renal failure, which is improved and stable.   6/26 1 u prbc ,+guaic  6/27 1u prbc transfused  7/2 HCT 21 will repeat CBC  Lovenox 80 BID coumadin 5 tonight c/w protonix gtt EGD in am 67 year old male s/p HM2 LVAD on 6/12   post op   prolonged intubation ,SVT and acute renal failure, which is improved and stable.   6/26 1 u prbc ,+guaic  6/27 1u prbc transfused  7/2 HCT 21 will repeat CBC  Lovenox 40 qd DVT prophylaxis  coumadin 5 tonight c/w protonix gtt EGD in am NPO at midnight

## 2017-07-02 NOTE — CHART NOTE - NSCHARTNOTEFT_GEN_A_CORE
Patient c/o pruritis of MTI site which he states he has not experienced in the past. Patient was given 1U RBC earlier this evening for anemia, but has received blood products in the past with no h/o this reaction. Given Benadryl 25mg PO x 1. Patient c/o pruritis of MTI site which he states he has not experienced in the past. Patient was given 1U RBC earlier this evening for anemia, but has received blood products in the past with no h/o this reaction. Given Benadryl 25mg PO x 1. Discussed with ICU staff.

## 2017-07-02 NOTE — PROVIDER CONTACT NOTE (CRITICAL VALUE NOTIFICATION) - ACTION/TREATMENT ORDERED:
1 PRBC
Continue to monitor
monitor for signs of bleeding, will leave in HD cath at this time
vitamin k 5 mg x 1 given, continue to assess
I unit PRBC's ordered, will continue to monitor pt
NP aware will continue to monitor
NPs aware Will continue to monitor pt
monitor for si/sx of bleeding, redraw in 6hrs
ABG drawn, 1unit prbc ordered
Monitor.
No intervention at this time. continue to monitor
To be decided. Silvano will consult with covering surgeon.
continue to monitor, no interventions at this time
continue to monitor, no interventions at this time
monitor for s.s bleeding. redraw INR in 6hrs. xarelto d/c'd

## 2017-07-02 NOTE — PROVIDER CONTACT NOTE (CRITICAL VALUE NOTIFICATION) - ASSESSMENT
Patient s/p cath has Impella in right groin, [place] in right IJ. BP stable. No active sites of bleeding
asymptomatic  no source of bleeding
pt intubated, sedated, on pressor and inotropic support at this time. Pt changed from heparin to argatroban for anticoagulation. HIT and CHERIE sent for suspected heparin induced thrombocytopenia. argatroban held at this time (plan to discontinue HD central line)
0 bleeding noted
Pt not actively bleeding, hemodynamically stable
Awake, Stable, No complaints.
MAP stable h/h low overnight pt was transfused with 1 u PRBC
Pt A&OX4, VSS, NSR on tele, no s/s of anemia, no c/o CP or SOB
Pt VSS, neuro intact.
VSS, MAP 60s-80s. pt on 3L NC, in no acute distress. no signs of bleeding.
VSS, MAP 60s-80s. pt on 3L NC, in no acute distress. no signs of bleeding.
no s/s bleeding
no s/s of bleeding
no si/sx of bleeding
pt intubated, sedated, on pressor and inotropic support at this time. Pt changed from heparin to argatroban for anticoagulation. HIT and CHERIE sent for suspected heparin induced thrombocytopenia. argatroban held at this time (plan to discontinue HD central line)
s/p LVAD on 6.12.17. patient is hypotensive- MAP 54 vasopressin started, patient was off pressors all morning. CVP 10, PA pressure 32/10. patient is urinating 200-300cc/hr.  PUMP flow on vad decreases from 4.5 to 2.9. md truong and donte ventura assess patient.

## 2017-07-02 NOTE — PROVIDER CONTACT NOTE (OTHER) - ACTION/TREATMENT ORDERED:
Silvano August PA at bedside to assess patient. Pending order for benadryl PO. Will follow and continue to monitor patient.

## 2017-07-03 LAB
ANION GAP SERPL CALC-SCNC: 12 MMOL/L — SIGNIFICANT CHANGE UP (ref 5–17)
APTT BLD: 35.4 SEC — SIGNIFICANT CHANGE UP (ref 27.5–37.4)
BUN SERPL-MCNC: 36 MG/DL — HIGH (ref 7–23)
CALCIUM SERPL-MCNC: 8.6 MG/DL — SIGNIFICANT CHANGE UP (ref 8.4–10.5)
CHLORIDE SERPL-SCNC: 98 MMOL/L — SIGNIFICANT CHANGE UP (ref 96–108)
CO2 SERPL-SCNC: 24 MMOL/L — SIGNIFICANT CHANGE UP (ref 22–31)
CREAT SERPL-MCNC: 1.22 MG/DL — SIGNIFICANT CHANGE UP (ref 0.5–1.3)
GLUCOSE SERPL-MCNC: 96 MG/DL — SIGNIFICANT CHANGE UP (ref 70–99)
HCT VFR BLD CALC: 23.8 % — LOW (ref 39–50)
HGB BLD-MCNC: 7.9 G/DL — LOW (ref 13–17)
INR BLD: 1.87 RATIO — HIGH (ref 0.88–1.16)
LDH SERPL L TO P-CCNC: 251 U/L — HIGH (ref 50–242)
MCHC RBC-ENTMCNC: 30.4 PG — SIGNIFICANT CHANGE UP (ref 27–34)
MCHC RBC-ENTMCNC: 33.1 GM/DL — SIGNIFICANT CHANGE UP (ref 32–36)
MCV RBC AUTO: 91.9 FL — SIGNIFICANT CHANGE UP (ref 80–100)
PLATELET # BLD AUTO: 398 K/UL — SIGNIFICANT CHANGE UP (ref 150–400)
POTASSIUM SERPL-MCNC: 4 MMOL/L — SIGNIFICANT CHANGE UP (ref 3.5–5.3)
POTASSIUM SERPL-SCNC: 4 MMOL/L — SIGNIFICANT CHANGE UP (ref 3.5–5.3)
PROTHROM AB SERPL-ACNC: 20.6 SEC — HIGH (ref 9.8–12.7)
RBC # BLD: 2.59 M/UL — LOW (ref 4.2–5.8)
RBC # FLD: 18.4 % — HIGH (ref 10.3–14.5)
SODIUM SERPL-SCNC: 134 MMOL/L — LOW (ref 135–145)
WBC # BLD: 14 K/UL — HIGH (ref 3.8–10.5)
WBC # FLD AUTO: 14 K/UL — HIGH (ref 3.8–10.5)

## 2017-07-03 PROCEDURE — 43235 EGD DIAGNOSTIC BRUSH WASH: CPT | Mod: GC

## 2017-07-03 PROCEDURE — 99232 SBSQ HOSP IP/OBS MODERATE 35: CPT | Mod: GC

## 2017-07-03 RX ORDER — FUROSEMIDE 40 MG
20 TABLET ORAL
Qty: 0 | Refills: 0 | Status: DISCONTINUED | OUTPATIENT
Start: 2017-07-03 | End: 2017-07-12

## 2017-07-03 RX ORDER — ACETAMINOPHEN 500 MG
650 TABLET ORAL ONCE
Qty: 0 | Refills: 0 | Status: COMPLETED | OUTPATIENT
Start: 2017-07-03 | End: 2017-07-03

## 2017-07-03 RX ADMIN — Medication 650 MILLIGRAM(S): at 21:08

## 2017-07-03 RX ADMIN — PANTOPRAZOLE SODIUM 10 MG/HR: 20 TABLET, DELAYED RELEASE ORAL at 05:43

## 2017-07-03 RX ADMIN — Medication 100 MILLIGRAM(S): at 21:08

## 2017-07-03 RX ADMIN — Medication 0.6 MILLIGRAM(S): at 14:11

## 2017-07-03 RX ADMIN — Medication 650 MILLIGRAM(S): at 21:50

## 2017-07-03 RX ADMIN — Medication 20 MILLIGRAM(S): at 18:59

## 2017-07-03 RX ADMIN — Medication 25 MILLIGRAM(S): at 00:06

## 2017-07-03 RX ADMIN — CARVEDILOL PHOSPHATE 3.12 MILLIGRAM(S): 80 CAPSULE, EXTENDED RELEASE ORAL at 18:59

## 2017-07-03 RX ADMIN — AMIODARONE HYDROCHLORIDE 200 MILLIGRAM(S): 400 TABLET ORAL at 05:43

## 2017-07-03 RX ADMIN — Medication 40 MILLIGRAM(S): at 05:43

## 2017-07-03 RX ADMIN — CARVEDILOL PHOSPHATE 3.12 MILLIGRAM(S): 80 CAPSULE, EXTENDED RELEASE ORAL at 05:43

## 2017-07-03 RX ADMIN — QUETIAPINE FUMARATE 50 MILLIGRAM(S): 200 TABLET, FILM COATED ORAL at 21:08

## 2017-07-03 NOTE — PROGRESS NOTE ADULT - PROBLEM SELECTOR PLAN 1
diuresis with Lasix 40 po bid as per CHF team  continue current medications- coreg and amio  hold anticoagulation today 7/3 as per CHF team diuresis with Lasix 40 po bid as per CHF team  continue current medications- coreg and amio  hold anticoagulation today 7/3 until GI bleed resolved

## 2017-07-03 NOTE — PROGRESS NOTE ADULT - ASSESSMENT
67 year old man with ACC/AHA stage D congestive heart failure with severely reduced LV systolic function due to a nonischemic dilated cardiomyopathy s/p HM2 LVAD on 6/12 with post-operative course complicated by self-terminating ventricular tachycardia and acute renal failure, which is improved and stable. He continues to improve functionally off of milrinone.  acute blood loss anemia postop requiring multiple PRBC transfusions and 6/27 +guiac- GI following DR. James  pt placed on Protonix gttp  7/3 anticoagulation on Hold as Per CHF team   npo for EGD today 7/3 with Dr. James 67 year old man with ACC/AHA stage D congestive heart failure with severely reduced LV systolic function due to a nonischemic dilated cardiomyopathy s/p HM2 LVAD on 6/12 with post-operative course complicated by self-terminating ventricular tachycardia and acute renal failure, which is improved and stable. He continues to improve functionally off of milrinone.  acute blood loss anemia postop requiring multiple PRBC transfusions and 6/27 +guiac- GI following DR. James  pt placed on Protonix gttp  7/3 anticoagulation on Hold as Per CHF team   npo for EGD today 7/3 with Dr. James   discharge planning- eventually rehab

## 2017-07-03 NOTE — PROGRESS NOTE ADULT - PROBLEM SELECTOR PLAN 2
No programming changes  Continue to hold aspirin and coumadin while evaluating GI bleed No programming changes  Continue to hold aspirin and coumadin while evaluating GI bleed  discharge planning- eventually rehab

## 2017-07-03 NOTE — PROGRESS NOTE ADULT - PROBLEM SELECTOR PLAN 1
hemodynamically mediated in setting of decompensated heart failure +/- urinary retention component and hypotension.  Pt cr impoved.  Pt with  good urine output.  Monitor BMP  Strict I/O, avoid nephrotoxics, NSAIDs, RCA. Daily weights

## 2017-07-03 NOTE — PROGRESS NOTE ADULT - SUBJECTIVE AND OBJECTIVE BOX
Chief Complaint:  Patient is a 67y old  Male who presents with a chief complaint of Transfer from [facility] for management of ADHF/ LVAD eval (2017 00:32)      Interval Events: Was called by NP overnight to re-evaluate patient as he is persistently anemia, drop in Hgb yesterday to 6.8- given 1 unit PRBC with improvement to 7.8 this morning. Pt denies any abdominal pain. Stools are dark in color. On Coumadin (he received dose last night).     Allergies:  No Known Allergies      Hospital Medications:  sodium chloride 0.9%. 1000 milliLiter(s) IV Continuous <Continuous>  docusate sodium 100 milliGRAM(s) Oral three times a day  dextrose 5%. 1000 milliLiter(s) IV Continuous <Continuous>  dextrose 50% Injectable 25 Gram(s) IV Push once  amiodarone    Tablet 200 milliGRAM(s) Oral daily  insulin lispro (HumaLOG) corrective regimen sliding scale   SubCutaneous three times a day before meals  insulin lispro (HumaLOG) corrective regimen sliding scale   SubCutaneous at bedtime  furosemide Infusion 10 mG/Hr IV Continuous <Continuous>  colchicine 0.6 milliGRAM(s) Oral daily  QUEtiapine 50 milliGRAM(s) Oral at bedtime  carvedilol 3.125 milliGRAM(s) Oral every 12 hours  pantoprazole Infusion 8 mG/Hr IV Continuous <Continuous>      PMHX/PSHX:  H/O prior ablation treatment  Ventricular fibrillation  PAF (paroxysmal atrial fibrillation)  Non-Ischemic Cardiomyopathy  SVT (Supraventricular Tachycardia)  HTN  CHF (Congestive Heart Failure)  Status post left hip replacement  Hypertension  Hypertension  History of Prior Ablation Treatment  AICD (Automatic Cardioverter/Defibrillator) Present      Family history:  No pertinent family history in first degree relatives      ROS:     General:  No fevers, chills, night sweats, fatigue,   CV:  No pain, palpitations, hypo/hypertension  Resp:  No dyspnea, cough, tachypnea, wheezing  GI:  interval events   Heme:  No petechiae, ecchymosis, easy bruisability  Skin:  No rash, edema      PHYSICAL EXAM:     GENERAL:  Appears stated age, well-groomed, well-nourished, no distress  HEENT:  NC/AT,  conjunctivae clear, sclera -anicteric  CHEST:  Full & symmetric excursion, no increased effort, breath sounds clear  HEART:  Regular rhythm, S1, S2, no murmur/rub/S3/S4,  no edema  ABDOMEN:  Soft, non-tender, non-distended, normoactive bowel sounds,  no masses ,no hepato-splenomegaly,   EXTREMITIES:  no cyanosis,clubbing or edema  SKIN:  No rash/erythema/ecchymoses/petechiae/wounds/abscess/warm/dry  NEURO:  Alert, oriented         Vital Signs:  Vital Signs Last 24 Hrs  T(C): 36.6 (2017 04:36), Max: 36.8 (2017 18:30)  T(F): 97.9 (2017 04:36), Max: 98.3 (2017 18:30)  HR: 78 (2017 04:36) (78 - 93)  BP: --  BP(mean): 78 (2017 04:36) (64 - 84)  RR: 18 (2017 04:36) (18 - 18)  SpO2: 98% (2017 04:36) (95% - 98%)  Daily     Daily Weight in k.5 (2017 11:00)    LABS:                        7.9    14.0  )-----------( 398      ( 2017 03:48 )             23.8   Hemoglobin: 7.9 g/dL (17 @ 03:48)  1PRBC   Hemoglobin: 6.8 g/dL (17 @ 16:52)  Hemoglobin: 7.0 g/dL (17 @ 03:22)  Hemoglobin: 7.4 g/dL (17 @ 03:48)  Hemoglobin: 8.1 g/dL (17 @ 03:46)        134<L>  |  98  |  36<H>  ----------------------------<  96  4.0   |  24  |  1.22    Ca    8.6      2017 03:48    TPro  5.5<L>  /  Alb  2.4<L>  /  TBili  1.2  /  DBili  x   /  AST  50<H>  /  ALT  33  /  AlkPhos  141<H>  07    LIVER FUNCTIONS - ( 2017 03:22 )  Alb: 2.4 g/dL / Pro: 5.5 g/dL / ALK PHOS: 141 U/L / ALT: 33 U/L RC / AST: 50 U/L / GGT: x           PT/INR - ( 2017 03:48 )   PT: 20.6 sec;   INR: 1.87 ratio         PTT - ( 2017 03:48 )  PTT:35.4 sec      Imaging:  < from: Upper Endoscopy (17 @ 13:15) >  [facility]  _______________________________________________________________________________  Patient Name: Yoseph Baez            Procedure Date: 2017 1:15 PM  MRN: [id number]                     Account Number: [id number]  YOB: 1950              Admit Type: Inpatient  Room: [place] [address]                         Gender: Male  Attending MD: RODRIGUEZ BRAR MD   _______________________________________________________________________________     Procedure:           Upper GI endoscopy  Indications:         Melena  Providers:           RODRIGUEZ BRAR MD, MELI ENRIQUE MD (Fellow)  Referring MD:        EMIR BRAVO MD  Medicines:           Monitored Anesthesia Care  Complications:       No immediate complications.  Procedure:           Pre-Anesthesia Assessment:                       - Prior to the procedure, a History and Physical was                        performed, and patient medications and allergies were                        reviewed. The risks and benefits of the procedure and                        the sedation options and risks were discussed with the                        patient. All questions were answered and informed                        consent was obtained. Patient identification and                        proposed procedure were verified. Prophylactic                        Antibiotics: The patient does not require prophylactic                        antibiotics. Prior Anticoagulants: The patient has taken                       no previous anticoagulant or antiplatelet agents. ASA                        Grade Assessment: IV - A patient with severe systemic                        disease that is a constant threat to life. After                        reviewing the risks and benefits, the patient was deemed                        in satisfactory condition to undergo the procedure. The                        anesthesia plan was to use monitored anesthesia care                        (MAC). Immediately prior to administration of                        medications, the patient was re-assessed for adequacy to                        receive sedatives. The heart rate, respiratory rate,                        oxygen saturations, blood pressure, adequacy of                        pulmonary ventilation, and response to care were                        monitored throughout the procedure. The physical status                        of the patient was re-assessed after the procedure.                       After obtaining informed consent, the endoscope was                        passed under direct vision. Throughout the procedure,                        the patient's blood pressure, pulse, and oxygen                        saturations were monitored continuously. The Endoscope                        was introduced through the mouth, and advanced to the                        second part of duodenum. The upper GI endoscopy was                        accomplished without difficulty. The patient tolerated                        the procedure fairly well.                                                                                   Findings:       Mucosal changes characterized by Esophagitis Dissecans Superficialis        were found in the middle third of the esophagus. The esophagus was        otherwise normal. Irregular Z line was noted at 40cm. Z line, GEJ and        hiatus were noted at 40cm.       One large non-bleeding cratered gastric ulcer was found on the lesser        curvature ofthe stomach just proximal to the angulus. The lesion was 30        mm in largest dimension. A clean white ulcer base was noted (low risk        stigmata for bleeding). Biopsies were taken with a cold forceps for        histology.       Mildly erythematous mucosa without active bleeding and with no stigmata        of bleeding was found in the duodenal bulb. Mildly erythematous mucosa        without active bleeding and with no stigmata of bleeding was found in        the duodenal bulb.       The 2nd part of the duodenum was normal.                                                                                   Impression:          - Esophagitis Dissecans Superficialis mucosa in the                        esophagus.                       - Non-bleeding gastric ulcer. Biopsied.                       - Erythematous duodenopathy.                       - Normal 2nd part of the duodenum.  Recommendation:      - Return patient to ICU for ongoing care.                       - Use Protonix (pantoprazole) 40 mg PO BID.                       - keep on clears for Virtual CT colonography by                        radiology tomorrow.                       -Await pathology for HP evaluation.                           < end of copied text >    Surgical Pathology Report (17 @ 13:00)    Surgical Pathology Report:   ACCESSION No:  [id number]    YOSEPH BAEZ                         2        Surgical Final Report          Final Diagnosis    1. Stomach, antrum, biopsy:  - Gastric antral mucosa with intestinal metaplasia and  chronic gastritis.  - Negative for dysplasia.  - Negative for Helicobacter pylori (special stain).    2. Gastric ulcer, biopsy:  - Gastric antral-oxyntic mucosa with intestinal metaplasia  in background of  ulceration and chronic active gastritis.  - Negative for dysplasia (additional levels x3 examined).  - Negative for Helicobacter pylori (special stain and  immunohistochemistry).    Slide(s) with built in immunohistochemical study control(s)  associated and negative control with this case has/have been  verified by the sign out pathologist.  These immunohistochemical/ in-situ hybridization tests have been  developed and their performance characteristics determined by  [facility] / [facility], Department of  Pathology, Division of Immunopathology, [address] [place] [address].  It has not been cleared or approved by the  U.S. Food and Drug Administration.  The FDA has determined that  such clearance or approval is not necessary.  This test is used  for clinical purposes.  The laboratory iscertified under the  CLIA-88 as qualified to perform high  complexity clinical testing.    Ariel Johnson M.D.  (Electronic Signature)  Reported on: 17    Clinical History  66 years old with CAF, gastric ulcer.  EGD    Specimen(s) Submitted  1-Antrum biopsy  2- Gastric ulcer    Gross Description  17 17:55  1.   The specimen is received in formalin and the specimen  container is labeled: Antrum.  It consists of one fragments of  tan-pink, soft tissue, measuring 0.3 cm in greatest dimension.  Entirely submitted.  One cassette.            YOSEPH BAEZ                         2        Surgical Final Report            2.   The specimen is received in formalin and the specimen  container is labeled: Gastric ulcer.  It consists of three  fragments of tan-pink, soft tissue, ranging from 0.1-0.3  cm in  greatest dimension.  Entirely submitted.  One cassette.    In addition to other data that may appear on the specimen  containers, all labels have been inspected to confirm the  presenceof the patient's name and date of birth.    FOREIGN  u  2017 05:56 PM

## 2017-07-03 NOTE — PROGRESS NOTE ADULT - ASSESSMENT
66y/o M with advanced NICM BiV HFrEF 15-20% on a stable off milrinones/p AICD, with replacement from [employer] to [employer] (2009), PAF on AC and h/o VT, with recent admission for AICD firing, with decompensated heart failure s/p LVAD on 6/12 post op with VT and NORMAN. Patient now with drop in Hgb. planned for EGD.

## 2017-07-03 NOTE — PROGRESS NOTE ADULT - PROBLEM SELECTOR PLAN 2
Pt with proteinuria. Serum free light chain ratio unremarkable. SPEP was negavtive. Urine protein/cr ratio decreased to 0.5 with improvement of cr.

## 2017-07-03 NOTE — PROGRESS NOTE ADULT - SUBJECTIVE AND OBJECTIVE BOX
VAD Education:      Today, We spent approximately 1 hour reviewing all equipment. Patient able to name each piece.  He knows battery life.  We began to review alarms: yellow yaritza and red battery.  Patient was able to put himself from batteries to power module and then back again as well as pack up his bag to go for a walk.  Patient knows what the green lights on the controller mean.    With some improvisation patient was able to scroll through screens on the controller and read all the numbers.    Friend Tahira not back until Wednesday, trying to educate one of his brothers as well.    Patient walked with PT he was able to ambulate the entire unit with walker.    Will do next dressing on Wednesday. VAD Education:      Today, We spent approximately 1 hour reviewing all equipment. Patient able to name each piece.  He knows battery life.  We began to review alarms: yellow yaritza and red battery.  Patient was able to put himself from batteries to power module and then back again as well as pack up his bag to go for a walk.  Patient knows what the green lights on the controller mean.    With some improvisation patient was able to scroll through screens on the controller and read all the numbers.    Friend Tahira not back until Wednesday, trying to educate one of his brothers as well.    Patient walked with PT he was able to ambulate the entire unit with walker.    Will do next dressing on Wednesday.    LVAD Interrogation:few PI events no suction events  Pump Flow:4.6  Pump Speed:9200  Pulse Index:6.1  Pump Power:5.2  VAD Events: none  Driveline evaluation:  dressing dry and intack  Programming Changes: [X ] No changes made [ ] Changes made:

## 2017-07-03 NOTE — PROGRESS NOTE ADULT - PROBLEM SELECTOR PLAN 1
Continue with PPI gtt for now, monitor Hgb daily, monitor bowel movements  Pt has been kept NPO p MN- will speak with Dr. James re: endoscopic evaluation  Would c/w A/C for now given LVAD as INR is subtherapeutic

## 2017-07-03 NOTE — PROGRESS NOTE ADULT - SUBJECTIVE AND OBJECTIVE BOX
VITAL SIGNS    Telemetry:  rsr/ ct    Vital Signs Last 24 Hrs  T(C): 36.6 (17 @ 04:36), Max: 36.8 (17 @ 18:30)  T(F): 97.9 (17 @ 04:36), Max: 98.3 (17 @ 18:30)  HR: 78 (17 @ 04:36) (78 - 93)  BP: --  RR: 18 (17 @ 04:36) (18 - 18)  SpO2: 98% (17 @ 04:36) (96% - 98%)             @ 07:01  -   @ 07:00  --------------------------------------------------------  IN: 1360 mL / OUT: 1625 mL / NET: -265 mL     @ 07:01  -   @ 12:35  --------------------------------------------------------  IN: 360 mL / OUT: 500 mL / NET: -140 mL       Daily     Daily Weight in k (2017 06:00)  Admit Wt: Drug Dosing Weight  Height (cm): 172.72 (2017 17:13)  Weight (kg): 75 (2017 15:17)  BMI (kg/m2): 25.1 (2017 17:13)  BSA (m2): 1.89 (2017 17:13)      MEDICATIONS  docusate sodium 100 milliGRAM(s) Oral three times a day  amiodarone    Tablet 200 milliGRAM(s) Oral daily  colchicine 0.6 milliGRAM(s) Oral daily  QUEtiapine 50 milliGRAM(s) Oral at bedtime  carvedilol 3.125 milliGRAM(s) Oral every 12 hours  pantoprazole Infusion 8 mG/Hr IV Continuous <Continuous>  furosemide    Tablet 40 milliGRAM(s) Oral two times a day      PHYSICAL EXAM    Subjective: "Hi.   Neurology: alert and oriented x 3, nonfocal, no gross deficits  CV : tele:  RSR  w/ pvc's  Sternal Wound :  CDI with dressing , Stable  drive line cdi with dressing  Lungs: clear. RR easy, unlabored   Abdomen: soft, nontender, nondistended, positive bowel sounds, bowel movement   Neg N/V/D   :  pt voiding without difficulty   Extremities:   RUVALCABA; neg edema, neg calf tenderness.   PPP bilaterally      PW: no  Chest tubes: none     LABS      134<L>  |  98  |  36<H>  ----------------------------<  96  4.0   |  24  |  1.22    Ca    8.6      2017 03:48    TPro  5.5<L>  /  Alb  2.4<L>  /  TBili  1.2  /  DBili  x   /  AST  50<H>  /  ALT  33  /  AlkPhos  141<H>                                   7.9    14.0  )-----------( 398      ( 2017 03:48 )             23.8          PT/INR - ( 2017 03:48 )   PT: 20.6 sec;   INR: 1.87 ratio         PTT - ( 2017 03:48 )  PTT:35.4 sec           PAST MEDICAL & SURGICAL HISTORY:  Ventricular fibrillation: s/p AICD  PAF (paroxysmal atrial fibrillation): on xarelto  Non-Ischemic Cardiomyopathy  SVT (Supraventricular Tachycardia)  HTN  CHF (Congestive Heart Failure)  Status post left hip replacement  History of Prior Ablation Treatment: for afib  AICD (Automatic Cardioverter/Defibrillator) Present: [employer] with 1 [employer] lead09- explanted and replaced with [employer] 2 leads on 09

## 2017-07-03 NOTE — PROGRESS NOTE ADULT - SUBJECTIVE AND OBJECTIVE BOX
Pre-Endoscopy Evaluation      Referring Physician:   REANNA Barr MD                                 Procedure: EGD    Indication for Procedure: GIB    Pertinent History: 68 yo male with extensive cardiac hx below with melena, acute blood anemia    Sedation by Anesthesia [ X]    PAST MEDICAL & SURGICAL HISTORY:  Ventricular fibrillation: s/p AICD  PAF (paroxysmal atrial fibrillation): on xarelto  Non-Ischemic Cardiomyopathy  SVT (Supraventricular Tachycardia)  HTN  CHF (Congestive Heart Failure)  Status post left hip replacement  History of Prior Ablation Treatment: for afib  AICD (Automatic Cardioverter/Defibrillator) Present: [employer] with 1 [employer] lead09- explanted and replaced with Med[employer] 2 leads on 09      PMH of Gastroparesis [ ]  Gastric Surgery [ ]  Gastric Outlet Obstruction [ ] no    Allergies    No Known Allergies    Latex allergy: [ ] yes [x] no    Medications:MEDICATIONS  (STANDING):  docusate sodium 100 milliGRAM(s) Oral three times a day  amiodarone    Tablet 200 milliGRAM(s) Oral daily  colchicine 0.6 milliGRAM(s) Oral daily  QUEtiapine 50 milliGRAM(s) Oral at bedtime  carvedilol 3.125 milliGRAM(s) Oral every 12 hours  pantoprazole Infusion 8 mG/Hr (10 mL/Hr) IV Continuous <Continuous>  furosemide    Tablet 20 milliGRAM(s) Oral two times a day    MEDICATIONS  (PRN):      Smoking: [ ] yes  [x] no    AICD/PPM: [X] yes   [ ] no    Pertinent lab data:                        7.9    14.0  )-----------( 398      ( 2017 03:48 )             23.8     07-    134<L>  |  98  |  36<H>  ----------------------------<  96  4.0   |  24  |  1.22    Ca    8.6      2017 03:48    TPro  5.5<L>  /  Alb  2.4<L>  /  TBili  1.2  /  DBili  x   /  AST  50<H>  /  ALT  33  /  AlkPhos  141<H>  07-02    PT/INR - ( 2017 03:48 )   PT: 20.6 sec;   INR: 1.87 ratio         PTT - ( 2017 03:48 )  PTT:35.4 sec        Physical Examination:  Daily     Daily Weight in k (2017 06:00)  Vital Signs Last 24 Hrs  T(C): 36.6 (2017 04:36), Max: 36.8 (2017 18:30)  T(F): 97.9 (2017 04:36), Max: 98.3 (2017 18:30)  HR: 87 (2017 14:41) (78 - 93)  BP: --  BP(mean): 84 (2017 13:53) (78 - 84)  RR: 18 (2017 04:36) (18 - 18)  SpO2: 97% (2017 14:41) (96% - 98%)    BP:             HR:  87     SPO2: 97%     Temperature: 36.6    Constitutional:     Neck:      Respiratory:    Cardiovascular:    Gastrointestinal:     Extremities:    Neurological:     Comments:    ASA Class: I [ ]  II [ ]  III [ ]  IV [x]    The patient is a suitable candidate for the planned procedure unless box checked [ ]  No, explain:

## 2017-07-03 NOTE — PROGRESS NOTE ADULT - SUBJECTIVE AND OBJECTIVE BOX
[facility] DIVISION OF KIDNEY DISEASES AND HYPERTENSION -- FOLLOW UP NOTE  --------------------------------------------------------------------------------  Chief Complaint:    24 hour events/subjective:  Patient has no new complaints. feeling well.      PAST HISTORY  --------------------------------------------------------------------------------  No significant changes to PMH, PSH, FHx, SHx, unless otherwise noted    ALLERGIES & MEDICATIONS  --------------------------------------------------------------------------------  Allergies    No Known Allergies    Intolerances      Standing Inpatient Medications  docusate sodium 100 milliGRAM(s) Oral three times a day  amiodarone    Tablet 200 milliGRAM(s) Oral daily  colchicine 0.6 milliGRAM(s) Oral daily  QUEtiapine 50 milliGRAM(s) Oral at bedtime  carvedilol 3.125 milliGRAM(s) Oral every 12 hours  pantoprazole Infusion 8 mG/Hr IV Continuous <Continuous>  furosemide    Tablet 40 milliGRAM(s) Oral two times a day    PRN Inpatient Medications      REVIEW OF SYSTEMS  --------------------------------------------------------------------------------  Review Of Systems:  Constitutional: [ ] Fever [ ] Chills [ ] Fatigue [ ] Weight change   HEENT: [ ] Blurred vision [ ] Eye Pain [ ] Headache [ ] Runny nose [ ] Sore Throat   Respiratory: [ ] Cough [ ] Wheezing [ ] Shortness of breath  Cardiovascular: [ ] Chest Pain [ ] Palpitations [ ] LOBATO [ ] PND [ ] Orthopnea  Gastrointestinal: [ ] Abdominal Pain [ ] Diarrhea [ ] Constipation [ ] Hemorrhoids [ ] Nausea [ ] Vomiting  Genitourinary: [ ] Nocturia [ ] Dysuria [ ] Incontinence  Extremities: [ ] Swelling [ ] Joint Pain  Neurologic: [ ] Focal deficit [ ] Paresthesias [ ] Syncope  Lymphatic: [ ] Swelling [ ] Lymphadenopathy   Skin: [ ] Rash [ ] Ecchymoses [ ] Wounds [ ] Lesions  Psychiatry: [ ] Depression [ ] Suicidal/Homicidal Ideation [ ] Anxiety [ ] Sleep Disturbances  [ ] 10 point review of systems is otherwise negative except as mentioned above     [ ]Unable to obtain    All other systems were reviewed and are negative, except as noted.    VITALS/PHYSICAL EXAM  --------------------------------------------------------------------------------  T(C): 36.6 (07-03-17 @ 04:36), Max: 36.8 (07-02-17 @ 18:30)  HR: 78 (07-03-17 @ 04:36) (78 - 93)  BP: --  RR: 18 (07-03-17 @ 04:36) (18 - 18)  SpO2: 98% (07-03-17 @ 04:36) (96% - 98%)  Wt(kg): --    Admission Weight  Height (cm): 172.72 (11 Jun 2017 17:13)  Weight (kg): 75 (11 Jun 2017 15:17)  BMI (kg/m2): 25.1 (11 Jun 2017 17:13)  BSA (m2): 1.89 (11 Jun 2017 17:13)    07-02-17 @ 07:01  -  07-03-17 @ 07:00  --------------------------------------------------------  IN: 1360 mL / OUT: 1625 mL / NET: -265 mL    07-03-17 @ 07:01  -  07-03-17 @ 09:52  --------------------------------------------------------  IN: 360 mL / OUT: 500 mL / NET: -140 mL        Physical Exam:  	Gen: NAD,  	HEENT: braeden GALINDO  	Pulm: CTA B/L  	CV: RRR, S1S2;  	Back:no sacral edema  	Abd: , soft, nontender/nondistended  	UE:; no asterixis  	LE: ; no edema  	Neuro: No focal deficits,   	Psych: Normal affect and mood  	Skin: Warm, without rashes      LABS/STUDIES  --------------------------------------------------------------------------------              7.9    14.0  >-----------<  398      [07-03-17 @ 03:48]              23.8     134  |  98  |  36  ----------------------------<  96      [07-03-17 @ 03:48]  4.0   |  24  |  1.22        Ca     8.6     [07-03-17 @ 03:48]    TPro  5.5  /  Alb  2.4  /  TBili  1.2  /  DBili  x   /  AST  50  /  ALT  33  /  AlkPhos  141  [07-02-17 @ 03:22]    PT/INR: PT 20.6 , INR 1.87       [07-03-17 @ 03:48]  PTT: 35.4       [07-03-17 @ 03:48]          [07-03-17 @ 03:48]    Creatinine Trend:  SCr 1.22 [07-03 @ 03:48]  SCr 1.40 [07-02 @ 03:22]  SCr 1.52 [07-01 @ 03:48]  SCr 1.72 [06-30 @ 03:46]  SCr 1.72 [06-29 @ 05:07]    Urinalysis - [06-21-17 @ 18:17]      Color Yellow / Appearance  / SG 1.010 / pH 7.0      Gluc Negative / Ketone Negative  / Bili Negative / Urobili 4       Blood Small / Protein 30 / Leuk Est Moderate / Nitrite Negative      RBC 3-5 / WBC 6-10 / Hyaline  / Gran  / Sq Epi  / Non Sq Epi OCC / Bacteria Few    Urine Creatinine 69      [06-30-17 @ 19:50]  Urine Protein 37      [06-30-17 @ 19:50]    Iron 20, TIBC 352, %sat 6      [06-08-17 @ 07:14]  Ferritin 121.0      [06-09-17 @ 19:08]  HbA1c 5.5      [06-07-17 @ 06:14]  TSH 2.33      [06-14-17 @ 14:51]  Lipid: chol 62, TG 33, HDL 17, LDL 38      [06-07-17 @ 06:14]      Free Light Chains: kappa 16.30, lambda 11.10, ratio = 1.47      [06-27 @ 15:50]      RADIOLOGY  ---------------------------------------------------------------------------------

## 2017-07-03 NOTE — PROGRESS NOTE ADULT - ATTENDING COMMENTS
Excellent progress. s/p HM II June 19th.  Meds: Amnio 200. Coreg 3.125, Lasix 40 bid. Colchicine 0.6, IV PPI. Seroquel.   Drifting Hb- 9.4- 6.8 yesterday. s/p 1X PRBC yesterday  Awaiting EGD: endoscopy with possible push. Off AC.  Cr 1.2 LDh 251.   speed 9200 4.6 L/min   Marvin Campbell

## 2017-07-03 NOTE — PROGRESS NOTE ADULT - ASSESSMENT
1)Acute blood loss anemia- suspect secondary to gastric ulcer as seen on previous endoscopy (4/2017).   2)Severe systolic CHF s/p LVAD on Anticoagulation

## 2017-07-03 NOTE — PROGRESS NOTE ADULT - ATTENDING COMMENTS
NORMAN on CKD post LVAD: Improving rapidly, SCr 1.22, eGFR near 70 ml/min  SPEP/SIFE/ Free light chain ratio unremarkable   Volume status significantly improved with diuresis. Lasix management per heart failure  Anemia: +stool guaic. On  protonix chris Lindo

## 2017-07-03 NOTE — PROGRESS NOTE ADULT - SUBJECTIVE AND OBJECTIVE BOX
Interval History:    Medications:  docusate sodium 100 milliGRAM(s) Oral three times a day  amiodarone    Tablet 200 milliGRAM(s) Oral daily  colchicine 0.6 milliGRAM(s) Oral daily  QUEtiapine 50 milliGRAM(s) Oral at bedtime  carvedilol 3.125 milliGRAM(s) Oral every 12 hours  pantoprazole Infusion 8 mG/Hr IV Continuous <Continuous>  furosemide    Tablet 40 milliGRAM(s) Oral two times a day    Vitals:  T(C): 36.6 (17 @ 04:36), Max: 36.8 (17 @ 18:30)  HR: 78 (17 @ 04:36) (78 - 93)  BP(mean): 78 (17 @ 04:36) (64 - 84)-  ABP(mean): --  RR: 18 (17 @ 04:36) (18 - 18)  SpO2: 98% (17 @ 04:36) (96% - 98%)    Daily     Daily Weight in k (2017 06:00)    I&O's Summary    2017 07:  -  2017 07:00  --------------------------------------------------------  IN: 1360 mL / OUT: 1625 mL / NET: -265 mL    2017 07:01  -  2017 10:36  --------------------------------------------------------  IN: 360 mL / OUT: 500 mL / NET: -140 mL        Physical Exam:  Appearance: No Acute Distress  HEENT:   Normal oral mucosa, PERRL, EOMI	  Cardiovascular: Normal S1 S2, No JVD, No murmurs/rubs/gallops  Respiratory: Clear to auscultation bilaterally  Gastrointestinal:  Soft, Non-tender	  Skin: No cyanosis	  Neurologic: Non-focal  Extremities: No clubbing, cyanosis or edema  Vascular: 2+ DP/PT pulses bilateral  Psychiatry: A & O x 3, Mood & affect appropriate    LVAD Interrogation:  Pump Flow:  Pump Speed:  Pulse Index:  Pump Power:  VAD Events:   Driveline evaluation:    Programming Changes: [ ] No changes made [ ] Changes made:  Labs:                        7.9    14.0  )-----------( 398      ( 2017 03:48 )             23.8         134<L>  |  98  |  36<H>  ----------------------------<  96  4.0   |  24  |  1.22    Ca    8.6      2017 03:48    TPro  5.5<L>  /  Alb  2.4<L>  /  TBili  1.2  /  DBili  x   /  AST  50<H>  /  ALT  33  /  AlkPhos  141<H>      PT/INR - ( 2017 03:48 )   PT: 20.6 sec;   INR: 1.87 ratio         PTT - ( 2017 03:48 )  PTT:35.4 sec          Lactate Dehydrogenase, Serum: 251 U/L ( @ 03:48)  Lactate Dehydrogenase, Serum: 262 U/L ( @ 03:22)  Lactate Dehydrogenase, Serum: 271 U/L ( @ 03:48)          TELEMETRY: 	    ECG:      [ ] Echocardiogram: Interval History:    Medications:  docusate sodium 100 milliGRAM(s) Oral three times a day  amiodarone    Tablet 200 milliGRAM(s) Oral daily  colchicine 0.6 milliGRAM(s) Oral daily  QUEtiapine 50 milliGRAM(s) Oral at bedtime  carvedilol 3.125 milliGRAM(s) Oral every 12 hours  pantoprazole Infusion 8 mG/Hr IV Continuous <Continuous>  furosemide    Tablet 40 milliGRAM(s) Oral two times a day    Vitals:  T(C): 36.6 (17 @ 04:36), Max: 36.8 (17 @ 18:30)  HR: 78 (17 @ 04:36) (78 - 93)  BP(mean): 78 (17 @ 04:36) (64 - 84)-  ABP(mean): --  RR: 18 (17 @ 04:36) (18 - 18)  SpO2: 98% (17 @ 04:36) (96% - 98%)    Daily     Daily Weight in k (2017 06:00)    I&O's Summary    2017 07:  -  2017 07:00  --------------------------------------------------------  IN: 1360 mL / OUT: 1625 mL / NET: -265 mL    2017 07:01  -  2017 10:36  --------------------------------------------------------  IN: 360 mL / OUT: 500 mL / NET: -140 mL        Physical Exam:  Appearance: No Acute Distress  HEENT:   Normal oral mucosa, PERRL, EOMI	  Cardiovascular: Normal S1 S2, No JVD, No murmurs/rubs/gallops  Respiratory: Clear to auscultation bilaterally  Gastrointestinal:  Soft, Non-tender	  Skin: No cyanosis	  Neurologic: Non-focal  Extremities: No clubbing, cyanosis or edema  Vascular: 2+ DP/PT pulses bilateral  Psychiatry: A & O x 3, Mood & affect appropriate    LVAD Interrogation:  Pump Flow:4.6  Pump Speed:9200  Pulse Index:6.1  Pump Power:5.2  VAD Events:   Driveline evaluation:    Programming Changes: [ ] No changes made [ ] Changes made:  Labs:                        7.9    14.0  )-----------( 398      ( 2017 03:48 )             23.8         134<L>  |  98  |  36<H>  ----------------------------<  96  4.0   |  24  |  1.22    Ca    8.6      2017 03:48    TPro  5.5<L>  /  Alb  2.4<L>  /  TBili  1.2  /  DBili  x   /  AST  50<H>  /  ALT  33  /  AlkPhos  141<H>      PT/INR - ( 2017 03:48 )   PT: 20.6 sec;   INR: 1.87 ratio         PTT - ( 2017 03:48 )  PTT:35.4 sec          Lactate Dehydrogenase, Serum: 251 U/L ( @ 03:48)  Lactate Dehydrogenase, Serum: 262 U/L ( @ 03:22)  Lactate Dehydrogenase, Serum: 271 U/L ( @ 03:48)          TELEMETRY: 	    ECG:      [ ] Echocardiogram:

## 2017-07-03 NOTE — CHART NOTE - NSCHARTNOTEFT_GEN_A_CORE
Source: Patient [ X]    Family [ ]     other [ X] RN medical record     Per chart, Pt with end stage cardiomyopathy, cardiogenic shock s/p LVAD, NORMAN on CKD post LVAD- renal function continues to improve. Pt now with plan for endoscopic evaluation 2/2 acute blood loss anemia 2/2 GIB.     Pt reports being hungry, noted NPO today, and full liquids x3 days. Pt is drinking Ensure Enlive and reports a good PO intake. Pt is able to use general teach back points regarding CHF and coumadin education. Pt continue to require education follow up, knowledge deficit is improving.     Diet : NPO after midnigh      Patient reports [ ] nausea  [ ] vomiting [ ] diarrhea [ ] constipation  [ ]chewing problems [ ] swallowing issues  [ X] other: Pt denies GI distress at this time.      PO intake:  < 50% [ ] 50-75% [ ]   % [X ] when on a PO diet      Source for PO intake [ X] Patient [ ] family [ ] chart [X ] staff [ ] other     Enteral /Parenteral Nutrition:       Current Weight: 143.3lbs, stable   % Weight Change    Pertinent Medications: MEDICATIONS  (STANDING):  docusate sodium 100 milliGRAM(s) Oral three times a day  amiodarone    Tablet 200 milliGRAM(s) Oral daily  colchicine 0.6 milliGRAM(s) Oral daily  QUEtiapine 50 milliGRAM(s) Oral at bedtime  carvedilol 3.125 milliGRAM(s) Oral every 12 hours  pantoprazole Infusion 8 mG/Hr (10 mL/Hr) IV Continuous <Continuous>  furosemide    Tablet 20 milliGRAM(s) Oral two times a day    MEDICATIONS  (PRN):    Pertinent Labs:    Hgb/Hct:7.9/23.8-low, Na:134-low, K:4.0, Cl:98, BUN:36-high, Cr:1.22, Glucose:96, Ca:8.6, Total Protein:5.5-low, Albumin:2.4-low,Total Bilirubin:1.2, Direct , AlkPhos:141-high, AST:50-high, ALT:33    Skin: intact no edema     Estimated Needs:   [X ] no change since previous assessment  [ ] recalculated:       Previous Nutrition Diagnosis:    [X ] Food & Nutrition Related Knowledge Deficit [X ] Malnutrition          Nutrition Diagnosis is [ X] ongoing  [ ] resolved [ ] not applicable   Malnutrition addressed with PO supplements.   Knowledge deficit addressed with HF nutrition therapy and coumadin/vitamin K education review and teach back          New Nutrition Diagnosis: [X ] not applicable         Interventions:     Recommend    [X] Change Diet To: Advance diet to Soft, low Na, ensure enlive x2 when medically feasible     [ X] Nutrition Supplement; Continue Ensuer Enlive x2    [ ] Nutrition Support    [X ] Other: When feasible 1. Provide food preferences as requested by Pt/family within diet restrictions  2. Encourage PO intake during meal times 3. Follow up with education review      Monitoring and Evaluation:     [X ] PO intake [X ] Tolerance to diet prescription [X ] weights [X ] follow up per protocol    [X ] other: RD remains available Sarah Siegler RD. Pager #[phone]

## 2017-07-04 LAB
ANION GAP SERPL CALC-SCNC: 12 MMOL/L — SIGNIFICANT CHANGE UP (ref 5–17)
APTT BLD: 33.1 SEC — SIGNIFICANT CHANGE UP (ref 27.5–37.4)
APTT BLD: 51.8 SEC — HIGH (ref 27.5–37.4)
BUN SERPL-MCNC: 31 MG/DL — HIGH (ref 7–23)
CALCIUM SERPL-MCNC: 8.1 MG/DL — LOW (ref 8.4–10.5)
CHLORIDE SERPL-SCNC: 101 MMOL/L — SIGNIFICANT CHANGE UP (ref 96–108)
CO2 SERPL-SCNC: 22 MMOL/L — SIGNIFICANT CHANGE UP (ref 22–31)
CREAT SERPL-MCNC: 1.31 MG/DL — HIGH (ref 0.5–1.3)
GLUCOSE SERPL-MCNC: 105 MG/DL — HIGH (ref 70–99)
HCT VFR BLD CALC: 21.9 % — LOW (ref 39–50)
HCT VFR BLD CALC: 24.9 % — LOW (ref 39–50)
HGB BLD-MCNC: 7.4 G/DL — LOW (ref 13–17)
HGB BLD-MCNC: 8.4 G/DL — LOW (ref 13–17)
INR BLD: 1.85 RATIO — HIGH (ref 0.88–1.16)
LDH SERPL L TO P-CCNC: 242 U/L — SIGNIFICANT CHANGE UP (ref 50–242)
MCHC RBC-ENTMCNC: 31.4 PG — SIGNIFICANT CHANGE UP (ref 27–34)
MCHC RBC-ENTMCNC: 31.8 PG — SIGNIFICANT CHANGE UP (ref 27–34)
MCHC RBC-ENTMCNC: 33.8 GM/DL — SIGNIFICANT CHANGE UP (ref 32–36)
MCHC RBC-ENTMCNC: 33.8 GM/DL — SIGNIFICANT CHANGE UP (ref 32–36)
MCV RBC AUTO: 93 FL — SIGNIFICANT CHANGE UP (ref 80–100)
MCV RBC AUTO: 94.2 FL — SIGNIFICANT CHANGE UP (ref 80–100)
PLATELET # BLD AUTO: 363 K/UL — SIGNIFICANT CHANGE UP (ref 150–400)
PLATELET # BLD AUTO: 367 K/UL — SIGNIFICANT CHANGE UP (ref 150–400)
POTASSIUM SERPL-MCNC: 3.7 MMOL/L — SIGNIFICANT CHANGE UP (ref 3.5–5.3)
POTASSIUM SERPL-SCNC: 3.7 MMOL/L — SIGNIFICANT CHANGE UP (ref 3.5–5.3)
PROTHROM AB SERPL-ACNC: 20.2 SEC — HIGH (ref 9.8–12.7)
RBC # BLD: 2.35 M/UL — LOW (ref 4.2–5.8)
RBC # BLD: 2.65 M/UL — LOW (ref 4.2–5.8)
RBC # FLD: 17.6 % — HIGH (ref 10.3–14.5)
RBC # FLD: 18.6 % — HIGH (ref 10.3–14.5)
SODIUM SERPL-SCNC: 135 MMOL/L — SIGNIFICANT CHANGE UP (ref 135–145)
WBC # BLD: 10.4 K/UL — SIGNIFICANT CHANGE UP (ref 3.8–10.5)
WBC # BLD: 11 K/UL — HIGH (ref 3.8–10.5)
WBC # FLD AUTO: 10.4 K/UL — SIGNIFICANT CHANGE UP (ref 3.8–10.5)
WBC # FLD AUTO: 11 K/UL — HIGH (ref 3.8–10.5)

## 2017-07-04 PROCEDURE — 99233 SBSQ HOSP IP/OBS HIGH 50: CPT

## 2017-07-04 PROCEDURE — 93750 INTERROGATION VAD IN PERSON: CPT

## 2017-07-04 PROCEDURE — 99232 SBSQ HOSP IP/OBS MODERATE 35: CPT | Mod: GC

## 2017-07-04 RX ORDER — HEPARIN SODIUM 5000 [USP'U]/ML
900 INJECTION INTRAVENOUS; SUBCUTANEOUS
Qty: 25000 | Refills: 0 | Status: DISCONTINUED | OUTPATIENT
Start: 2017-07-04 | End: 2017-07-06

## 2017-07-04 RX ORDER — ENOXAPARIN SODIUM 100 MG/ML
75 INJECTION SUBCUTANEOUS
Qty: 0 | Refills: 0 | Status: DISCONTINUED | OUTPATIENT
Start: 2017-07-04 | End: 2017-07-04

## 2017-07-04 RX ORDER — ENOXAPARIN SODIUM 100 MG/ML
75 INJECTION SUBCUTANEOUS EVERY 12 HOURS
Qty: 0 | Refills: 0 | Status: DISCONTINUED | OUTPATIENT
Start: 2017-07-04 | End: 2017-07-04

## 2017-07-04 RX ORDER — ENOXAPARIN SODIUM 100 MG/ML
80 INJECTION SUBCUTANEOUS EVERY 12 HOURS
Qty: 0 | Refills: 0 | Status: DISCONTINUED | OUTPATIENT
Start: 2017-07-04 | End: 2017-07-04

## 2017-07-04 RX ORDER — ENOXAPARIN SODIUM 100 MG/ML
70 INJECTION SUBCUTANEOUS EVERY 12 HOURS
Qty: 0 | Refills: 0 | Status: DISCONTINUED | OUTPATIENT
Start: 2017-07-04 | End: 2017-07-04

## 2017-07-04 RX ADMIN — CARVEDILOL PHOSPHATE 3.12 MILLIGRAM(S): 80 CAPSULE, EXTENDED RELEASE ORAL at 18:37

## 2017-07-04 RX ADMIN — Medication 0.6 MILLIGRAM(S): at 12:08

## 2017-07-04 RX ADMIN — CARVEDILOL PHOSPHATE 3.12 MILLIGRAM(S): 80 CAPSULE, EXTENDED RELEASE ORAL at 05:02

## 2017-07-04 RX ADMIN — AMIODARONE HYDROCHLORIDE 200 MILLIGRAM(S): 400 TABLET ORAL at 05:02

## 2017-07-04 RX ADMIN — Medication 20 MILLIGRAM(S): at 18:37

## 2017-07-04 RX ADMIN — Medication 100 MILLIGRAM(S): at 21:53

## 2017-07-04 RX ADMIN — HEPARIN SODIUM 900 UNIT(S)/HR: 5000 INJECTION INTRAVENOUS; SUBCUTANEOUS at 15:12

## 2017-07-04 RX ADMIN — Medication 20 MILLIGRAM(S): at 05:02

## 2017-07-04 RX ADMIN — HEPARIN SODIUM 1100 UNIT(S)/HR: 5000 INJECTION INTRAVENOUS; SUBCUTANEOUS at 21:53

## 2017-07-04 RX ADMIN — Medication 100 MILLIGRAM(S): at 05:02

## 2017-07-04 RX ADMIN — Medication 100 MILLIGRAM(S): at 15:07

## 2017-07-04 RX ADMIN — QUETIAPINE FUMARATE 50 MILLIGRAM(S): 200 TABLET, FILM COATED ORAL at 21:53

## 2017-07-04 NOTE — PROGRESS NOTE ADULT - SUBJECTIVE AND OBJECTIVE BOX
VITAL SIGNS  Telemetry: SR 90    Vital Signs Last 24 Hrs  T(C): 36.9 (17 @ 08:00), Max: 37.1 (17 @ 16:00)  T(F): 98.4 (17 @ 08:00), Max: 98.7 (17 @ 16:00)  HR: 84 (17 @ 08:00) (84 - 91)  BP: --  RR: 18 (17 @ 08:00) (17 - 18)  SpO2: 96% (17 @ 08:00) (93% - 98%)             @ 07:01  -   @ 07:00  --------------------------------------------------------  IN: 820 mL / OUT: 1600 mL / NET: -780 mL    Daily Weight in k (2017 09:00)  Admit Wt: Drug Dosing Weight  Height (cm): 172.72 (2017 17:13)  Weight (kg): 75 (2017 15:17)  BMI (kg/m2): 25.1 (2017 17:13)  BSA (m2): 1.89 (2017 17:13)      MEDICATIONS  docusate sodium 100 milliGRAM(s) Oral three times a day  amiodarone    Tablet 200 milliGRAM(s) Oral daily  colchicine 0.6 milliGRAM(s) Oral daily  QUEtiapine 50 milliGRAM(s) Oral at bedtime  carvedilol 3.125 milliGRAM(s) Oral every 12 hours  pantoprazole Infusion 8 mG/Hr IV Continuous <Continuous>  furosemide    Tablet 20 milliGRAM(s) Oral two times a day  enoxaparin Injectable 75 milliGRAM(s) SubCutaneous two times a day      PHYSICAL EXAM    Subjective: No complaints offered. Denies black stool  Neurology: alert and oriented x 3, nonfocal, no gross deficits  CV : S1S2  Sternal Wound :  CDI , Stable  Lungs:CTA b/l   Abdomen: soft, NT,ND, ( )BM  :  voiding  Extremities: -c/c/e      LABS      135  |  101  |  31<H>  ----------------------------<  105<H>  3.7   |  22  |  1.31<H>    Ca    8.1<L>      2017 03:44                                   7.4    10.4  )-----------( 367      ( 2017 03:44 )             21.9          PT/INR - ( 2017 03:44 )   PT: 20.2 sec;   INR: 1.85 ratio         PTT - ( 2017 03:44 )  PTT:33.1 sec       PAST MEDICAL & SURGICAL HISTORY:  Ventricular fibrillation: s/p AICD  PAF (paroxysmal atrial fibrillation): on xarelto  Non-Ischemic Cardiomyopathy  SVT (Supraventricular Tachycardia)  HTN  CHF (Congestive Heart Failure)  Status post left hip replacement  History of Prior Ablation Treatment: for afib  AICD (Automatic Cardioverter/Defibrillator) Present: [employer] with 1 [employer] lead09- explanted and replaced with [employer] 2 leads on 09

## 2017-07-04 NOTE — CHART NOTE - NSCHARTNOTEFT_GEN_A_CORE
GASTROENTEROLOGY- Video Capsule Endoscopy Note    Risks, benefits, alternatives described to patient including, but not limited to, bleeding, infection, organ damage, perforation, missed lesions and risks of anesthesia. Patient understands and agrees to these risks. Copy of consent can be found in patient's paper chart.    Risks of video capsule endoscopy explained, including risk of retained capsule, potentially requiring surgery for removal.  Patient understands and agrees to risks.    Capsule ID: [id number]    Capsule swallowed at: 8am    NPO now. Can start Clear liquids at 10AM, Can resume regular consistency diet at: 12PM    Equipment can be removed at: 8pm    GI fellow will retrieve equipment in the morning.  Please call with questions [phone]

## 2017-07-04 NOTE — PROGRESS NOTE ADULT - ASSESSMENT
1)Acute blood loss anemia- suspect secondary to gastric ulcer as seen on previous endoscopy (4/2017); no clear source seen on EGD 7/3/17.   2)Severe systolic CHF s/p LVAD on Anticoagulation

## 2017-07-04 NOTE — PROGRESS NOTE ADULT - SUBJECTIVE AND OBJECTIVE BOX
HPI:  Denies CP/SOB. s/p EGD which showed healed ulcer. No active source on EGD. Presently undergoing VCE.  Denies hematochezia or melena      Review Of Systems:  10 point review of systems is otherwise negative except as mentioned above            Medications:  docusate sodium 100 milliGRAM(s) Oral three times a day  amiodarone    Tablet 200 milliGRAM(s) Oral daily  colchicine 0.6 milliGRAM(s) Oral daily  QUEtiapine 50 milliGRAM(s) Oral at bedtime  carvedilol 3.125 milliGRAM(s) Oral every 12 hours  pantoprazole Infusion 8 mG/Hr IV Continuous <Continuous>  furosemide    Tablet 20 milliGRAM(s) Oral two times a day    PMH/PSH/FH/SH: [ ] Unchanged  Vitals:  T(C): 36.9 (07-04-17 @ 08:00), Max: 37.1 (07-03-17 @ 16:00)  HR: 84 (07-04-17 @ 08:00) (84 - 91)  BP: --  BP(mean): 76 (07-04-17 @ 08:00) (72 - 90)  RR: 18 (07-04-17 @ 08:00) (17 - 18)  SpO2: 96% (07-04-17 @ 08:00) (93% - 98%)  Wt(kg): --  Daily     Daily   I&O's Summary    03 Jul 2017 07:01  -  04 Jul 2017 07:00  --------------------------------------------------------  IN: 820 mL / OUT: 1600 mL / NET: -780 mL        Physical Exam:  Appearance: [x ] Normal [ ] NAD  Eyes: [x ] PERRL [ ] EOMI  HENT: [x ] Normal oral [place] [ ]NC/AT  Cardiovascular: [x ] LVAD hum, typical sounding [x ] No m/r/g [ ]No edema [ ] JVP  Respiratory: [x ] Clear to auscultation bilaterally  Gastrointestinal: [x ] Soft [x ] Non-tender [ ] Non-distended [ ] BS+  Musculoskeletal: [x ] No clubbing [ ] No joint deformity   Neurologic: [x ] Non-focal  Lymphatic: [x ] No lymphadenopathy  Psychiatry: [x ] AAOx3 [ ] Mood & affect appropriate  Skin: [x ] No rashes [ ] No ecchymoses [ ] No cyanosis    LVAD Interrogation:  Pump Flow:4.8  Pump Speed:9000  Pulse Index:6.0  Pump Power:5.1  VAD Events: No PI events on LVAD interrogation  Programming Changes: [x ] No changes made [ ] Changes made:      07-04    135  |  101  |  31<H>  ----------------------------<  105<H>  3.7   |  22  |  1.31<H>    Ca    8.1<L>      04 Jul 2017 03:44      PT/INR - ( 04 Jul 2017 03:44 )   PT: 20.2 sec;   INR: 1.85 ratio    PTT - ( 04 Jul 2017 03:44 )  PTT:33.1 sec   >>> 242    Interpretation of Telemetry: SR ()

## 2017-07-04 NOTE — PROGRESS NOTE ADULT - ASSESSMENT
ASSESSMENT  1. Nonischemic dilated cardiomyopathy s/p HM2 LVAD on 6/12  2. Acute blood loss anemia sec to GI bleeding, source undetermined  3. Hx of gastric ulcers  4. Hx of VT    PLAN  1. Video capsule endoscopy in progress. No overt source found on EGD. To be completed tonight at 8PM.  2. Resume A/C with Lovenox SQ (75mg SQ BID).  3. Serial H/H. Keep Hgb > 8.0.  4. Hold ASA.  5. c/w PPI IV.  6. c/w Amiodarone, Coreg and PO Lasix.      Gaurav Neely MD  [id number]

## 2017-07-04 NOTE — PROGRESS NOTE ADULT - PROBLEM SELECTOR PLAN 1
C/w amiodarone, po diuretics, coreg, colchicine and protonix gtt  monitor H/H  Coumadin held,  Lovenox bid initiated  Capsule study today  daily LDH/CBC/INR

## 2017-07-04 NOTE — PROGRESS NOTE ADULT - SUBJECTIVE AND OBJECTIVE BOX
Chief Complaint:  Patient is a 67y old  Male who presents with a chief complaint of Transfer from [facility] for management of ADHF/ LVAD eval (03 Jun 2017 00:32)      Interval Events: Hgb stable overnight, no signs of overt GI bleed. S/p EGd yesterday- small superficial gastric ulcer, unlikely source of bleed. Plan for Video Capsule endoscopy today.      Allergies:  No Known Allergies      Hospital Medications:  sodium chloride 0.9%. 1000 milliLiter(s) IV Continuous <Continuous>  docusate sodium 100 milliGRAM(s) Oral three times a day  dextrose 5%. 1000 milliLiter(s) IV Continuous <Continuous>  dextrose 50% Injectable 25 Gram(s) IV Push once  amiodarone    Tablet 200 milliGRAM(s) Oral daily  insulin lispro (HumaLOG) corrective regimen sliding scale   SubCutaneous three times a day before meals  insulin lispro (HumaLOG) corrective regimen sliding scale   SubCutaneous at bedtime  furosemide Infusion 10 mG/Hr IV Continuous <Continuous>  colchicine 0.6 milliGRAM(s) Oral daily  QUEtiapine 50 milliGRAM(s) Oral at bedtime  carvedilol 3.125 milliGRAM(s) Oral every 12 hours  pantoprazole Infusion 8 mG/Hr IV Continuous <Continuous>      PMHX/PSHX:  H/O prior ablation treatment  Ventricular fibrillation  PAF (paroxysmal atrial fibrillation)  Non-Ischemic Cardiomyopathy  SVT (Supraventricular Tachycardia)  HTN  CHF (Congestive Heart Failure)  Status post left hip replacement  Hypertension  Hypertension  History of Prior Ablation Treatment  AICD (Automatic Cardioverter/Defibrillator) Present      Family history:  No pertinent family history in first degree relatives      ROS:     General:  No fevers, chills, night sweats, fatigue,   CV:  No pain, palpitations, hypo/hypertension  Resp:  No dyspnea, cough, tachypnea, wheezing  GI:  interval events   Heme:  No petechiae, ecchymosis, easy bruisability  Skin:  No rash, edema      Vital Signs:  Vital Signs Last 24 Hrs  T(C): 36.6 (04 Jul 2017 04:00), Max: 37.1 (03 Jul 2017 16:00)  T(F): 97.9 (04 Jul 2017 04:00), Max: 98.7 (03 Jul 2017 16:00)  HR: 88 (04 Jul 2017 04:00) (84 - 91)  BP: --  BP(mean): 74 (04 Jul 2017 04:00) (70 - 90)  RR: 17 (04 Jul 2017 04:00) (17 - 18)  SpO2: 93% (04 Jul 2017 04:00) (93% - 98%)  Daily     Daily     LABS:                        7.4    10.4  )-----------( 367      ( 04 Jul 2017 03:44 )             21.9     07-04    135  |  101  |  31<H>  ----------------------------<  105<H>  3.7   |  22  |  1.31<H>    Ca    8.1<L>      04 Jul 2017 03:44        PT/INR - ( 04 Jul 2017 03:44 )   PT: 20.2 sec;   INR: 1.85 ratio         PTT - ( 04 Jul 2017 03:44 )  PTT:33.1 sec                PHYSICAL EXAM:     GENERAL:  Appears stated age, well-groomed, well-nourished, no distress  HEENT:  NC/AT,  conjunctivae clear, sclera -anicteric  CHEST:  Full & symmetric excursion, no increased effort, breath sounds clear  HEART:  Regular rhythm, S1, S2, no murmur/rub/S3/S4,  no edema  ABDOMEN:  Soft, non-tender, non-distended, normoactive bowel sounds,  no masses ,no hepato-splenomegaly,   EXTREMITIES:  no cyanosis,clubbing or edema  SKIN:  No rash/erythema/ecchymoses/petechiae/wounds/abscess/warm/dry  NEURO:  Alert, oriented             Imaging:  < from: Upper Endoscopy (04.13.17 @ 13:15) >  [facility]  _______________________________________________________________________________  Patient Name: Yoseph Baez            Procedure Date: 4/13/2017 1:15 PM  MRN: [id number]                     Account Number: [id number]  YOB: 1950              Admit Type: Inpatient  Room: [place] [address]                         Gender: Male  Attending MD: RODRIGUEZ BRAR MD   _______________________________________________________________________________     Procedure:           Upper GI endoscopy  Indications:         Melena  Providers:           RODRIGUEZ BRAR MD, MELI ENRIQUE MD (Fellow)  Referring MD:        EMIR BRAVO MD  Medicines:           Monitored Anesthesia Care  Complications:       No immediate complications.  Procedure:           Pre-Anesthesia Assessment:                       - Prior to the procedure, a History and Physical was                        performed, and patient medications and allergies were                        reviewed. The risks and benefits of the procedure and                        the sedation options and risks were discussed with the                        patient. All questions were answered and informed                        consent was obtained. Patient identification and                        proposed procedure were verified. Prophylactic                        Antibiotics: The patient does not require prophylactic                        antibiotics. Prior Anticoagulants: The patient has taken                       no previous anticoagulant or antiplatelet agents. ASA                        Grade Assessment: IV - A patient with severe systemic                        disease that is a constant threat to life. After                        reviewing the risks and benefits, the patient was deemed                        in satisfactory condition to undergo the procedure. The                        anesthesia plan was to use monitored anesthesia care                        (MAC). Immediately prior to administration of                        medications, the patient was re-assessed for adequacy to                        receive sedatives. The heart rate, respiratory rate,                        oxygen saturations, blood pressure, adequacy of                        pulmonary ventilation, and response to care were                        monitored throughout the procedure. The physical status                        of the patient was re-assessed after the procedure.                       After obtaining informed consent, the endoscope was                        passed under direct vision. Throughout the procedure,                        the patient's blood pressure, pulse, and oxygen                        saturations were monitored continuously. The Endoscope                        was introduced through the mouth, and advanced to the                        second part of duodenum. The upper GI endoscopy was                        accomplished without difficulty. The patient tolerated                        the procedure fairly well.                                                                                   Findings:       Mucosal changes characterized by Esophagitis Dissecans Superficialis        were found in the middle third of the esophagus. The esophagus was        otherwise normal. Irregular Z line was noted at 40cm. Z line, GEJ and        hiatus were noted at 40cm.       One large non-bleeding cratered gastric ulcer was found on the lesser        curvature ofthe stomach just proximal to the angulus. The lesion was 30        mm in largest dimension. A clean white ulcer base was noted (low risk        stigmata for bleeding). Biopsies were taken with a cold forceps for        histology.       Mildly erythematous mucosa without active bleeding and with no stigmata        of bleeding was found in the duodenal bulb. Mildly erythematous mucosa        without active bleeding and with no stigmata of bleeding was found in        the duodenal bulb.       The 2nd part of the duodenum was normal.                                                                                   Impression:          - Esophagitis Dissecans Superficialis mucosa in the                        esophagus.                       - Non-bleeding gastric ulcer. Biopsied.                       - Erythematous duodenopathy.                       - Normal 2nd part of the duodenum.  Recommendation:      - Return patient to ICU for ongoing care.                       - Use Protonix (pantoprazole) 40 mg PO BID.                       - keep on clears for Virtual CT colonography by                        radiology tomorrow.                       -Await pathology for HP evaluation.                           < end of copied text >    Surgical Pathology Report (04.13.17 @ 13:00)    Surgical Pathology Report:   ACCESSION No:  [id number]    YOSEPH BAEZ                         2        Surgical Final Report          Final Diagnosis    1. Stomach, antrum, biopsy:  - Gastric antral mucosa with intestinal metaplasia and  chronic gastritis.  - Negative for dysplasia.  - Negative for Helicobacter pylori (special stain).    2. Gastric ulcer, biopsy:  - Gastric antral-oxyntic mucosa with intestinal metaplasia  in background of  ulceration and chronic active gastritis.  - Negative for dysplasia (additional levels x3 examined).  - Negative for Helicobacter pylori (special stain and  immunohistochemistry).    Slide(s) with built in immunohistochemical study control(s)  associated and negative control with this case has/have been  verified by the sign out pathologist.  These immunohistochemical/ in-situ hybridization tests have been  developed and their performance characteristics determined by  [facility] / [facility], Department of  Pathology, Division of Immunopathology, [address] [place] [address].  It has not been cleared or approved by the  U.[place]. Food and Drug Administration.  The FDA has determined that  such clearance or approval is not necessary.  This test is used  for clinical purposes.  The laboratory iscertified under the  CLIA-88 as qualified to perform high  complexity clinical testing.    Ariel Johnson M.D.  (Electronic Signature)  Reported on: 04/18/17    Clinical History  66 years old with CAF, gastric ulcer.  EGD    Specimen(s) Submitted  1-Antrum biopsy  2- Gastric ulcer    Gross Description  04/13/17 17:55  1.   The specimen is received in formalin and the specimen  container is labeled: Antrum.  It consists of one fragments of  tan-pink, soft tissue, measuring 0.3 cm in greatest dimension.  Entirely submitted.  One cassette.            YOSEPH BAEZ                         2        Surgical Final Report            2.   The specimen is received in formalin and the specimen  container is labeled: Gastric ulcer.  It consists of three  fragments of tan-pink, soft tissue, ranging from 0.1-0.3  cm in  greatest dimension.  Entirely submitted.  One cassette.    In addition to other data that may appear on the specimen  containers, all labels have been inspected to confirm the  presenceof the patient's name and date of birth.    XW  Thu  April 13, 2017 05:56 PM

## 2017-07-04 NOTE — PROGRESS NOTE ADULT - ASSESSMENT
67 year old male s/p HM2 LVAD on 6/12   post op   prolonged intubation ,SVT and acute renal failure, which is improved and stable.   6/26 1 u prbc ,+guaic  6/27 1u prbc transfused  7/3 EGD demonstrates nonbleeding gastric ulcer

## 2017-07-04 NOTE — PROGRESS NOTE ADULT - ATTENDING COMMENTS
s/p endoscopy healed gastric ulcer. undergoing pill study.  PPI drip to protonix IV bid.  Hb 7.9 to 7.4. INR 1.85.   Start heparin. goal PTT 60-70.  Will need a further unit of blood.   Marvin Campbell s/p endoscopy healed gastric ulcer. undergoing pill study.  PPI drip to protonix IV bid.  Hb 6.8 to Hb 7.9 after 1 PRBC on July 2. Hb tody 7.4. INR 1.85.   Receiving 1 unit of blood  Start heparin. no bolus. goal PTT 50-60  Follow Hb on low dose heparin. Check capsule.   Marvin Campbell

## 2017-07-04 NOTE — PROGRESS NOTE ADULT - PROBLEM SELECTOR PLAN 1
Monitor daily Hgb, monitor bowel movements  Would d/c PPI drip and change to Pantoprazole 40mg PO daily   Keep NPO for now, plan for capsule endosocpy today- will leave additional chart note outlining advancement of diet through day  Would c/w A/C for now given LVAD as INR is subtherapeutic

## 2017-07-04 NOTE — PROGRESS NOTE ADULT - ATTENDING COMMENTS
Pt seen with fellow  Anemia, recent EGD showed ulcer, ? source of blood loss?  Capsule small bowel study in progress  Will follow up

## 2017-07-05 LAB
ALBUMIN SERPL ELPH-MCNC: 2.4 G/DL — LOW (ref 3.3–5)
ALP SERPL-CCNC: 131 U/L — HIGH (ref 40–120)
ALT FLD-CCNC: 30 U/L RC — SIGNIFICANT CHANGE UP (ref 10–45)
ANION GAP SERPL CALC-SCNC: 13 MMOL/L — SIGNIFICANT CHANGE UP (ref 5–17)
APTT BLD: 106.9 SEC — HIGH (ref 27.5–37.4)
APTT BLD: 69.5 SEC — HIGH (ref 27.5–37.4)
APTT BLD: 80.1 SEC — HIGH (ref 27.5–37.4)
AST SERPL-CCNC: 36 U/L — SIGNIFICANT CHANGE UP (ref 10–40)
BILIRUB SERPL-MCNC: 1.2 MG/DL — SIGNIFICANT CHANGE UP (ref 0.2–1.2)
BUN SERPL-MCNC: 30 MG/DL — HIGH (ref 7–23)
CALCIUM SERPL-MCNC: 8.3 MG/DL — LOW (ref 8.4–10.5)
CHLORIDE SERPL-SCNC: 100 MMOL/L — SIGNIFICANT CHANGE UP (ref 96–108)
CO2 SERPL-SCNC: 21 MMOL/L — LOW (ref 22–31)
CREAT SERPL-MCNC: 1.15 MG/DL — SIGNIFICANT CHANGE UP (ref 0.5–1.3)
GLUCOSE SERPL-MCNC: 86 MG/DL — SIGNIFICANT CHANGE UP (ref 70–99)
HCT VFR BLD CALC: 26.1 % — LOW (ref 39–50)
HGB BLD-MCNC: 8.5 G/DL — LOW (ref 13–17)
INR BLD: 1.9 RATIO — HIGH (ref 0.88–1.16)
LDH SERPL L TO P-CCNC: 238 U/L — SIGNIFICANT CHANGE UP (ref 50–242)
MCHC RBC-ENTMCNC: 31 PG — SIGNIFICANT CHANGE UP (ref 27–34)
MCHC RBC-ENTMCNC: 32.6 GM/DL — SIGNIFICANT CHANGE UP (ref 32–36)
MCV RBC AUTO: 95 FL — SIGNIFICANT CHANGE UP (ref 80–100)
PLATELET # BLD AUTO: 376 K/UL — SIGNIFICANT CHANGE UP (ref 150–400)
POTASSIUM SERPL-MCNC: 4.1 MMOL/L — SIGNIFICANT CHANGE UP (ref 3.5–5.3)
POTASSIUM SERPL-SCNC: 4.1 MMOL/L — SIGNIFICANT CHANGE UP (ref 3.5–5.3)
PROT SERPL-MCNC: 5.7 G/DL — LOW (ref 6–8.3)
PROTHROM AB SERPL-ACNC: 20.8 SEC — HIGH (ref 9.8–12.7)
RBC # BLD: 2.75 M/UL — LOW (ref 4.2–5.8)
RBC # FLD: 17.8 % — HIGH (ref 10.3–14.5)
SODIUM SERPL-SCNC: 134 MMOL/L — LOW (ref 135–145)
WBC # BLD: 10.8 K/UL — HIGH (ref 3.8–10.5)
WBC # FLD AUTO: 10.8 K/UL — HIGH (ref 3.8–10.5)

## 2017-07-05 PROCEDURE — 91110 GI TRC IMG INTRAL ESOPH-ILE: CPT | Mod: 26,GC

## 2017-07-05 PROCEDURE — 99233 SBSQ HOSP IP/OBS HIGH 50: CPT

## 2017-07-05 PROCEDURE — 93750 INTERROGATION VAD IN PERSON: CPT

## 2017-07-05 PROCEDURE — 99232 SBSQ HOSP IP/OBS MODERATE 35: CPT | Mod: GC

## 2017-07-05 RX ADMIN — AMIODARONE HYDROCHLORIDE 200 MILLIGRAM(S): 400 TABLET ORAL at 06:40

## 2017-07-05 RX ADMIN — HEPARIN SODIUM 900 UNIT(S)/HR: 5000 INJECTION INTRAVENOUS; SUBCUTANEOUS at 18:50

## 2017-07-05 RX ADMIN — Medication 20 MILLIGRAM(S): at 06:40

## 2017-07-05 RX ADMIN — CARVEDILOL PHOSPHATE 3.12 MILLIGRAM(S): 80 CAPSULE, EXTENDED RELEASE ORAL at 17:52

## 2017-07-05 RX ADMIN — PANTOPRAZOLE SODIUM 10 MG/HR: 20 TABLET, DELAYED RELEASE ORAL at 11:33

## 2017-07-05 RX ADMIN — HEPARIN SODIUM 900 UNIT(S)/HR: 5000 INJECTION INTRAVENOUS; SUBCUTANEOUS at 11:31

## 2017-07-05 RX ADMIN — HEPARIN SODIUM 1100 UNIT(S)/HR: 5000 INJECTION INTRAVENOUS; SUBCUTANEOUS at 04:25

## 2017-07-05 RX ADMIN — Medication 100 MILLIGRAM(S): at 17:51

## 2017-07-05 RX ADMIN — Medication 20 MILLIGRAM(S): at 17:52

## 2017-07-05 RX ADMIN — QUETIAPINE FUMARATE 50 MILLIGRAM(S): 200 TABLET, FILM COATED ORAL at 21:11

## 2017-07-05 RX ADMIN — Medication 100 MILLIGRAM(S): at 21:11

## 2017-07-05 RX ADMIN — Medication 100 MILLIGRAM(S): at 06:40

## 2017-07-05 RX ADMIN — CARVEDILOL PHOSPHATE 3.12 MILLIGRAM(S): 80 CAPSULE, EXTENDED RELEASE ORAL at 06:40

## 2017-07-05 RX ADMIN — Medication 0.6 MILLIGRAM(S): at 11:34

## 2017-07-05 NOTE — PROGRESS NOTE ADULT - PROBLEM SELECTOR PLAN 2
Pt with proteinuria. Serum free light chain ratio unremarkable. SPEP was negative. Urine protein/cr ratio decreased to 0.5 with improvement of cr.

## 2017-07-05 NOTE — PROGRESS NOTE ADULT - ASSESSMENT
67 year old man with ACC/AHA stage D congestive heart failure with severely reduced LV systolic function due to a nonischemic dilated cardiomyopathy s/p HM2 LVAD on 6/12 with post-operative course complicated by self-terminating ventricular tachycardia and acute renal failure, which is improved and stable. He continues to improve functionally off of milrinone.  acute blood loss anemia postop requiring multiple PRBC transfusions and 6/27 +guiac- GI following DR. James  pt placed on Protonix gttp  7/3 anticoagulation on Hold as Per CHF team    7/3 EGD: non-bleeding gastric bubble  with Dr. James   7/4 capsule placed and now retrieved   discharge planning- eventually rehab - await results of capsule 67 year old man with ACC/AHA stage D congestive heart failure with severely reduced LV systolic function due to a nonischemic dilated cardiomyopathy s/p HM2 LVAD on 6/12 with post-operative course complicated by self-terminating ventricular tachycardia and acute renal failure, which is improved and stable. He continues to improve functionally off of milrinone.  acute blood loss anemia postop requiring multiple PRBC transfusions and 6/27 +guiac- GI following DR. James  pt placed on Protonix gttp  7/3 anticoagulation on Hold as Per CHF team    7/3 EGD: non-bleeding gastric bubble  with Dr. James   7/4 capsule placed and now retrieved / continue protonix gttp; h/h stable 8/26 today   discharge planning- eventually rehab - await results of capsule

## 2017-07-05 NOTE — PROGRESS NOTE ADULT - SUBJECTIVE AND OBJECTIVE BOX
[facility] DIVISION OF KIDNEY DISEASES AND HYPERTENSION -- FOLLOW UP NOTE  --------------------------------------------------------------------------------  Chief Complaint: SP TAVR, feelsw well    24 hour events/subjective:        PAST HISTORY  --------------------------------------------------------------------------------  No significant changes to PMH, PSH, FHx, SHx, unless otherwise noted    ALLERGIES & MEDICATIONS  --------------------------------------------------------------------------------  Allergies    No Known Allergies    Intolerances      Standing Inpatient Medications  docusate sodium 100 milliGRAM(s) Oral three times a day  amiodarone    Tablet 200 milliGRAM(s) Oral daily  colchicine 0.6 milliGRAM(s) Oral daily  QUEtiapine 50 milliGRAM(s) Oral at bedtime  carvedilol 3.125 milliGRAM(s) Oral every 12 hours  pantoprazole Infusion 8 mG/Hr IV Continuous <Continuous>  furosemide    Tablet 20 milliGRAM(s) Oral two times a day  heparin  Infusion. 900 Unit(s)/Hr IV Continuous <Continuous>    PRN Inpatient Medications      REVIEW OF SYSTEMS  --------------------------------------------------------------------------------  Gen: No weight changes, fatigue, fevers/chills, weakness  Skin: No rashes  Head/Eyes/Ears/Mouth: No headache; Normal hearing; Normal vision w/o blurriness; No sinus pain/discomfort, sore throat  Respiratory: No dyspnea, cough, wheezing, hemoptysis  CV: No chest pain, PND, orthopnea  GI: No abdominal pain, diarrhea, constipation, nausea, vomiting, melena, hematochezia  : No increased frequency, dysuria, hematuria, nocturia  MSK: No joint pain/swelling; no back pain; no edema  Neuro: No dizziness/lightheadedness, weakness, seizures, numbness, tingling  Heme: No easy bruising or bleeding  Endo: No heat/cold intolerance  Psych: No significant nervousness, anxiety, stress, depression    All other systems were reviewed and are negative, except as noted.    VITALS/PHYSICAL EXAM  --------------------------------------------------------------------------------  T(C): 36.8 (07-05-17 @ 08:00), Max: 37.1 (07-05-17 @ 00:00)  HR: 85 (07-05-17 @ 12:22) (82 - 92)  BP: --  RR: 16 (07-05-17 @ 08:00) (16 - 18)  SpO2: 96% (07-05-17 @ 08:00) (95% - 98%)  Wt(kg): --        07-04-17 @ 07:01  -  07-05-17 @ 07:00  --------------------------------------------------------  IN: 1483 mL / OUT: 1225 mL / NET: 258 mL    07-05-17 @ 07:01  -  07-05-17 @ 13:38  --------------------------------------------------------  IN: 360 mL / OUT: 0 mL / NET: 360 mL      Physical Exam:  	Gen: NAD, well-appearing  	HEENT: PERRL, supple neck, clear oropharynx  	Pulm: CTA B/L  	CV: RRR, S1S2; no rub  	Back: No spinal or CVA tenderness; no sacral edema  	Abd: +BS, soft, nontender/nondistended  	: No suprapubic tenderness  	UE: Warm, FROM, no clubbing, intact strength; no edema; no asterixis  	LE: Warm, FROM, no clubbing, intact strength; no edema  	Neuro: No focal deficits, intact gait  	Psych: Normal affect and mood  	Skin: Warm, without rashes  	Vascular access:    LABS/STUDIES  --------------------------------------------------------------------------------              8.5    10.8  >-----------<  376      [07-05-17 @ 03:53]              26.1     134  |  100  |  30  ----------------------------<  86      [07-05-17 @ 03:53]  4.1   |  21  |  1.15        Ca     8.3     [07-05-17 @ 03:53]    TPro  5.7  /  Alb  2.4  /  TBili  1.2  /  DBili  x   /  AST  36  /  ALT  30  /  AlkPhos  131  [07-05-17 @ 03:53]    PT/INR: PT 20.8 , INR 1.90       [07-05-17 @ 03:53]  PTT: 106.9      [07-05-17 @ 10:34]          [07-05-17 @ 03:53]    Creatinine Trend:  SCr 1.15 [07-05 @ 03:53]  SCr 1.31 [07-04 @ 03:44]  SCr 1.22 [07-03 @ 03:48]  SCr 1.40 [07-02 @ 03:22]  SCr 1.52 [07-01 @ 03:48]    Urinalysis - [06-21-17 @ 18:17]      Color Yellow / Appearance  / SG 1.010 / pH 7.0      Gluc Negative / Ketone Negative  / Bili Negative / Urobili 4       Blood Small / Protein 30 / Leuk Est Moderate / Nitrite Negative      RBC 3-5 / WBC 6-10 / Hyaline  / Gran  / Sq Epi  / Non Sq Epi OCC / Bacteria Few    Urine Creatinine 69      [06-30-17 @ 19:50]  Urine Protein 37      [06-30-17 @ 19:50]    Iron 20, TIBC 352, %sat 6      [06-08-17 @ 07:14]  Ferritin 121.0      [06-09-17 @ 19:08]  HbA1c 5.5      [06-07-17 @ 06:14]  TSH 2.33      [06-14-17 @ 14:51]  Lipid: chol 62, TG 33, HDL 17, LDL 38      [06-07-17 @ 06:14]      Free Light Chains: kappa 16.30, lambda 11.10, ratio = 1.47      [06-27 @ 15:50]

## 2017-07-05 NOTE — PROGRESS NOTE ADULT - ASSESSMENT
68y/o M with advanced NICM BiV HFrEF 15-20% on a stable off milrinones/p AICD, with replacement from [employer] to [employer] (2009), PAF on AC and h/o VT, with recent admission for AICD firing, with decompensated heart failure s/p LVAD on 6/12 post op with VT and NORMAN. Patient now with drop in Hgb. Had capsule study 7/4, Hb stable today

## 2017-07-05 NOTE — PROGRESS NOTE ADULT - PROBLEM SELECTOR PLAN 3
Remains improved, continue amiodarone  continue coreg improved,   continue amiodarone  continue coreg

## 2017-07-05 NOTE — PROGRESS NOTE ADULT - PROBLEM SELECTOR PLAN 1
diuresis as per CHF team  continue current medications- coreg and amio  hold coumadin/ heparin gttp  today 7/5 until capsule study results retrieved

## 2017-07-05 NOTE — PROGRESS NOTE ADULT - PROBLEM SELECTOR PLAN 1
hemodynamically mediated in setting of decompensated heart failure +/- urinary retention component and hypotension.  Pt cr improved, stable now.  Pt with  good urine output.

## 2017-07-05 NOTE — PROGRESS NOTE ADULT - PROBLEM SELECTOR PLAN 4
Will hold aspirin and coumadin. continue heparin gttp  await results of capsule study  h/h stable today 8/26/ VSS  continue protonix gttp as per GI Will hold aspirin and coumadin. continue heparin gttp  await results of capsule study  h/h stable today 8/26/ VSS  no active s/s of bleeding noted   continue protonix gttp as per GI

## 2017-07-05 NOTE — PROGRESS NOTE ADULT - SUBJECTIVE AND OBJECTIVE BOX
VITAL SIGNS    Telemetry:  rsr 1st 80-90   Vital Signs Last 24 Hrs  T(C): 36.8 (17 @ 08:00), Max: 37.1 (17 @ 00:00)  T(F): 98.2 (17 @ 08:00), Max: 98.7 (17 @ 00:00)  HR: 82 (17 @ 08:00) (82 - 92)  BP: --  RR: 16 (17 @ 08:00) (16 - 18)  SpO2: 96% (17 @ 08:00) (95% - 98%)             @ 07:01  -   @ 07:00  --------------------------------------------------------  IN: 1483 mL / OUT: 1225 mL / NET: 258 mL     @ 07:01  -   @ 11:34  --------------------------------------------------------  IN: 360 mL / OUT: 0 mL / NET: 360 mL       Daily     Daily Weight in k.9 (2017 07:49)  Admit Wt: Drug Dosing Weight  Height (cm): 172.72 (2017 17:13)  Weight (kg): 75 (2017 15:17)  BMI (kg/m2): 25.1 (2017 17:13)  BSA (m2): 1.89 (2017 17:13)    Bilirubin Total, Serum: 1.2 mg/dL ( @ 03:53)    CAPILLARY BLOOD GLUCOSE                MEDICATIONS  docusate sodium 100 milliGRAM(s) Oral three times a day  amiodarone    Tablet 200 milliGRAM(s) Oral daily  colchicine 0.6 milliGRAM(s) Oral daily  QUEtiapine 50 milliGRAM(s) Oral at bedtime  carvedilol 3.125 milliGRAM(s) Oral every 12 hours  pantoprazole Infusion 8 mG/Hr IV Continuous <Continuous>  furosemide    Tablet 20 milliGRAM(s) Oral two times a day  heparin  Infusion. 900 Unit(s)/Hr IV Continuous <Continuous>      PHYSICAL EXAM    Subjective: "I feel better today."   Neurology: alert and oriented x 3, nonfocal, no gross deficits  CV : tele:  RSR 1st 80-90   Sternal Wound :  CDI NAZ  drive line cdi with dressing   Lungs: clear. RR easy, unlabored   Abdomen: soft, nontender, nondistended, positive bowel sounds, + bowel movement   Neg N/V/D   :  pt voiding without difficulty   Extremities:   RUVALCABA; trace LE edema, neg calf tenderness.   PPP bilaterally      PW: none   Chest tubes: none     LABS      134<L>  |  100  |  30<H>  ----------------------------<  86  4.1   |  21<L>  |  1.15    Ca    8.3<L>      2017 03:53    TPro  5.7<L>  /  Alb  2.4<L>  /  TBili  1.2  /  DBili  x   /  AST  36  /  ALT  30  /  AlkPhos  131<H>                                   8.5    10.8  )-----------( 376      ( 2017 03:53 )             26.1          PT/INR - ( 2017 03:53 )   PT: 20.8 sec;   INR: 1.90 ratio         PTT - ( 2017 10:34 )  PTT:106.9 sec           PAST MEDICAL & SURGICAL HISTORY:  Ventricular fibrillation: s/p AICD  PAF (paroxysmal atrial fibrillation): on xarelto  Non-Ischemic Cardiomyopathy  SVT (Supraventricular Tachycardia)  HTN  CHF (Congestive Heart Failure)  Status post left hip replacement  History of Prior Ablation Treatment: for afib  AICD (Automatic Cardioverter/Defibrillator) Present: [employer] with 1 [employer] lead09- explanted and replaced with [employer] 2 leads on 09

## 2017-07-05 NOTE — PROGRESS NOTE ADULT - PROBLEM SELECTOR PLAN 2
No programming changes  Continue to hold aspirin and coumadin while evaluating GI bleed  discharge planning- eventually rehab No programming changes  Continue to hold aspirin and coumadin while evaluating GI bleed/ heparin gttp continued   discharge planning- eventually rehab

## 2017-07-05 NOTE — PROGRESS NOTE ADULT - SUBJECTIVE AND OBJECTIVE BOX
Interval History:    Medications:  docusate sodium 100 milliGRAM(s) Oral three times a day  amiodarone    Tablet 200 milliGRAM(s) Oral daily  colchicine 0.6 milliGRAM(s) Oral daily  QUEtiapine 50 milliGRAM(s) Oral at bedtime  carvedilol 3.125 milliGRAM(s) Oral every 12 hours  pantoprazole Infusion 8 mG/Hr IV Continuous <Continuous>  furosemide    Tablet 20 milliGRAM(s) Oral two times a day  heparin  Infusion. 900 Unit(s)/Hr IV Continuous <Continuous>    Vitals:  T(C): 36.8 (17 @ 08:00), Max: 37.1 (17 @ 00:00)  HR: 82 (17 @ 08:00) (82 - 92)  BP: --  BP(mean): 84 (17 @ 08:00) (72 - 90)  ABP: --  ABP(mean): --  RR: 16 (17 @ 08:00) (16 - 18)  SpO2: 96% (17 @ 08:00) (95% - 98%)  Wt(kg): --  CVP(cm H2O): --  CO: --  CI: --  PA: --  PA(mean): --  PCWP: --  SVR: --  PVR: --    Daily     Daily Weight in k.9 (2017 07:49)    I&O's Summary    2017 07:  -  2017 07:00  --------------------------------------------------------  IN: 1483 mL / OUT: 1225 mL / NET: 258 mL    2017 07:  -  2017 09:58  --------------------------------------------------------  IN: 360 mL / OUT: 0 mL / NET: 360 mL        Physical Exam:  Appearance: No Acute Distress  HEENT:   Normal oral mucosa, PERRL, EOMI	  Cardiovascular: Normal S1 S2, No JVD, No murmurs/rubs/gallops  Respiratory: Clear to auscultation bilaterally  Gastrointestinal:  Soft, Non-tender	  Skin: No cyanosis	  Neurologic: Non-focal  Extremities: No clubbing, cyanosis or edema  Vascular: 2+ DP/PT pulses bilateral  Psychiatry: A & O x 3, Mood & affect appropriate    LVAD Interrogation:  Pump Flow:  Pump Speed:  Pulse Index:  Pump Power:  VAD Events:   Driveline evaluation:    Programming Changes: [ ] No changes made [ ] Changes made:  Labs:                        8.5    10.8  )-----------( 376      ( 2017 03:53 )             26.1         134<L>  |  100  |  30<H>  ----------------------------<  86  4.1   |  21<L>  |  1.15    Ca    8.3<L>      2017 03:53    TPro  5.7<L>  /  Alb  2.4<L>  /  TBili  1.2  /  DBili  x   /  AST  36  /  ALT  30  /  AlkPhos  131<H>      PT/INR - ( 2017 03:53 )   PT: 20.8 sec;   INR: 1.90 ratio         PTT - ( 2017 03:53 )  PTT:80.1 sec          Lactate Dehydrogenase, Serum: 238 U/L ( @ 03:53)  Lactate Dehydrogenase, Serum: 242 U/L ( @ 03:44)  Lactate Dehydrogenase, Serum: 251 U/L ( @ 03:48)          TELEMETRY: 	    ECG:      [ ] Echocardiogram: Interval History:    Medications:  docusate sodium 100 milliGRAM(s) Oral three times a day  amiodarone    Tablet 200 milliGRAM(s) Oral daily  colchicine 0.6 milliGRAM(s) Oral daily  QUEtiapine 50 milliGRAM(s) Oral at bedtime  carvedilol 3.125 milliGRAM(s) Oral every 12 hours  pantoprazole Infusion 8 mG/Hr IV Continuous <Continuous>  furosemide    Tablet 20 milliGRAM(s) Oral two times a day  heparin  Infusion. 900 Unit(s)/Hr IV Continuous <Continuous>    Vitals:  T(C): 36.8 (17 @ 08:00), Max: 37.1 (17 @ 00:00)  HR: 82 (17 @ 08:00) (82 - 92)  BP(mean): 84 (17 @ 08:00) (72 - 90)  RR: 16 (17 @ 08:00) (16 - 18)  SpO2: 96% (17 @ 08:00) (95% - 98%)    Daily     Daily Weight in k.9 (2017 07:49)    I&O's Summary    2017 07:  -  2017 07:00  --------------------------------------------------------  IN: 1483 mL / OUT: 1225 mL / NET: 258 mL    2017 07:  -  2017 09:58  --------------------------------------------------------  IN: 360 mL / OUT: 0 mL / NET: 360 mL        Physical Exam:  Appearance: No Acute Distress  HEENT:   Normal oral mucosa, PERRL, EOMI	  Cardiovascular: Normal S1 S2, No JVD, No murmurs/rubs/gallops  Respiratory: Clear to auscultation bilaterally  Gastrointestinal:  Soft, Non-tender	  Skin: No cyanosis	  Neurologic: Non-focal  Extremities: No clubbing, cyanosis or edema  Vascular: 2+ DP/PT pulses bilateral  Psychiatry: A & O x 3, Mood & affect appropriate    LVAD Interrogation:  Pump Flow:4.4  Pump Speed: 9200  Pulse Index:6.6  Pump Power:5.2  Labs:                        8.5    10.8  )-----------( 376      ( 2017 03:53 )             26.1         134<L>  |  100  |  30<H>  ----------------------------<  86  4.1   |  21<L>  |  1.15    Ca    8.3<L>      2017 03:53    TPro  5.7<L>  /  Alb  2.4<L>  /  TBili  1.2  /  DBili  x   /  AST  36  /  ALT  30  /  AlkPhos  131<H>      PT/INR - ( 2017 03:53 )   PT: 20.8 sec;   INR: 1.90 ratio         PTT - ( 2017 03:53 )  PTT:80.1 sec  Lactate Dehydrogenase, Serum: 238 U/L ( @ 03:53)  Lactate Dehydrogenase, Serum: 242 U/L ( @ 03:44)  Lactate Dehydrogenase, Serum: 251 U/L ( @ 03:48)  TELEMETRY: Interval History: " I'm coming along, I swallowed a pill camera today. ". Patient found resting comfortably in chair.    Medications:  docusate sodium 100 milliGRAM(s) Oral three times a day  amiodarone    Tablet 200 milliGRAM(s) Oral daily  colchicine 0.6 milliGRAM(s) Oral daily  QUEtiapine 50 milliGRAM(s) Oral at bedtime  carvedilol 3.125 milliGRAM(s) Oral every 12 hours  pantoprazole Infusion 8 mG/Hr IV Continuous <Continuous>  furosemide    Tablet 20 milliGRAM(s) Oral two times a day  heparin  Infusion. 900 Unit(s)/Hr IV Continuous <Continuous>    Vitals:  T(C): 36.8 (17 @ 08:00), Max: 37.1 (17 @ 00:00)  HR: 82 (17 @ 08:00) (82 - 92)  BP(mean): 84 (17 @ 08:00) (72 - 90)  RR: 16 (17 @ 08:00) (16 - 18)  SpO2: 96% (17 @ 08:00) (95% - 98%)     Daily Weight in k.9 (2017 07:49)    I&O's Summary    2017 07:  -  2017 07:00  --------------------------------------------------------  IN: 1483 mL / OUT: 1225 mL / NET: 258 mL    2017 07:  -  2017 09:58  --------------------------------------------------------  IN: 360 mL / OUT: 0 mL / NET: 360 mL        Physical Exam:  Appearance: No Acute Distress  HEENT:   Normal oral mucosa, PERRL, EOMI	  Cardiovascular: Normal S1 S2, LVAD hum audible No JVD, No murmurs/rubs/gallops  Respiratory: Clear to auscultation bilaterally  Gastrointestinal:  Soft, Non-tender	  Skin: No cyanosis	  Neurologic: Non-focal  Extremities: No clubbing, cyanosis or edema  Vascular: 2+ DP/PT pulses bilateral  Psychiatry: A & O x 3, Mood & affect appropriate    LVAD Interrogation:  Pump Flow:4.4  Pump Speed: 9200  Pulse Index:6.6  Pump Power:5.2  Labs:                        8.5    10.8  )-----------( 376      ( 2017 03:53 )             26.1         134<L>  |  100  |  30<H>  ----------------------------<  86  4.1   |  21<L>  |  1.15    Ca    8.3<L>      2017 03:53    TPro  5.7<L>  /  Alb  2.4<L>  /  TBili  1.2  /  DBili  x   /  AST  36  /  ALT  30  /  AlkPhos  131<H>      PT/INR - ( 2017 03:53 )   PT: 20.8 sec;   INR: 1.90 ratio         PTT - ( 2017 03:53 )  PTT:80.1 sec  Lactate Dehydrogenase, Serum: 238 U/L ( @ 03:53)  Lactate Dehydrogenase, Serum: 242 U/L ( @ 03:44)  Lactate Dehydrogenase, Serum: 251 U/L ( @ 03:48)  TELEMETRY:

## 2017-07-05 NOTE — CHART NOTE - NSCHARTNOTEFT_GEN_A_CORE
Video capsule endoscopy reviewed and shows active bleeding in proximal small intestine.     Plan for enteroscopy tomorrow.   NPO after midnight.  Transfuse to maintain hgb > 8.

## 2017-07-06 LAB
ANION GAP SERPL CALC-SCNC: 11 MMOL/L — SIGNIFICANT CHANGE UP (ref 5–17)
ANION GAP SERPL CALC-SCNC: 12 MMOL/L — SIGNIFICANT CHANGE UP (ref 5–17)
APTT BLD: 62 SEC — HIGH (ref 27.5–37.4)
BLD GP AB SCN SERPL QL: NEGATIVE — SIGNIFICANT CHANGE UP
BUN SERPL-MCNC: 21 MG/DL — SIGNIFICANT CHANGE UP (ref 7–23)
BUN SERPL-MCNC: 26 MG/DL — HIGH (ref 7–23)
CALCIUM SERPL-MCNC: 8.3 MG/DL — LOW (ref 8.4–10.5)
CALCIUM SERPL-MCNC: 8.6 MG/DL — SIGNIFICANT CHANGE UP (ref 8.4–10.5)
CHLORIDE SERPL-SCNC: 102 MMOL/L — SIGNIFICANT CHANGE UP (ref 96–108)
CHLORIDE SERPL-SCNC: 102 MMOL/L — SIGNIFICANT CHANGE UP (ref 96–108)
CO2 SERPL-SCNC: 21 MMOL/L — LOW (ref 22–31)
CO2 SERPL-SCNC: 22 MMOL/L — SIGNIFICANT CHANGE UP (ref 22–31)
CREAT SERPL-MCNC: 1.07 MG/DL — SIGNIFICANT CHANGE UP (ref 0.5–1.3)
CREAT SERPL-MCNC: 1.13 MG/DL — SIGNIFICANT CHANGE UP (ref 0.5–1.3)
GLUCOSE SERPL-MCNC: 88 MG/DL — SIGNIFICANT CHANGE UP (ref 70–99)
GLUCOSE SERPL-MCNC: 99 MG/DL — SIGNIFICANT CHANGE UP (ref 70–99)
HCT VFR BLD CALC: 22.6 % — LOW (ref 39–50)
HGB BLD-MCNC: 7.8 G/DL — LOW (ref 13–17)
INR BLD: 1.78 RATIO — HIGH (ref 0.88–1.16)
LDH SERPL L TO P-CCNC: 229 U/L — SIGNIFICANT CHANGE UP (ref 50–242)
MCHC RBC-ENTMCNC: 32.3 PG — SIGNIFICANT CHANGE UP (ref 27–34)
MCHC RBC-ENTMCNC: 34.5 GM/DL — SIGNIFICANT CHANGE UP (ref 32–36)
MCV RBC AUTO: 93.6 FL — SIGNIFICANT CHANGE UP (ref 80–100)
PLATELET # BLD AUTO: 357 K/UL — SIGNIFICANT CHANGE UP (ref 150–400)
POTASSIUM SERPL-MCNC: 3.9 MMOL/L — SIGNIFICANT CHANGE UP (ref 3.5–5.3)
POTASSIUM SERPL-MCNC: 4.1 MMOL/L — SIGNIFICANT CHANGE UP (ref 3.5–5.3)
POTASSIUM SERPL-SCNC: 3.9 MMOL/L — SIGNIFICANT CHANGE UP (ref 3.5–5.3)
POTASSIUM SERPL-SCNC: 4.1 MMOL/L — SIGNIFICANT CHANGE UP (ref 3.5–5.3)
PROTHROM AB SERPL-ACNC: 19.5 SEC — HIGH (ref 9.8–12.7)
RBC # BLD: 2.41 M/UL — LOW (ref 4.2–5.8)
RBC # FLD: 17.7 % — HIGH (ref 10.3–14.5)
RH IG SCN BLD-IMP: POSITIVE — SIGNIFICANT CHANGE UP
SODIUM SERPL-SCNC: 135 MMOL/L — SIGNIFICANT CHANGE UP (ref 135–145)
SODIUM SERPL-SCNC: 135 MMOL/L — SIGNIFICANT CHANGE UP (ref 135–145)
WBC # BLD: 9.4 K/UL — SIGNIFICANT CHANGE UP (ref 3.8–10.5)
WBC # FLD AUTO: 9.4 K/UL — SIGNIFICANT CHANGE UP (ref 3.8–10.5)

## 2017-07-06 PROCEDURE — 44366 SMALL BOWEL ENDOSCOPY: CPT | Mod: GC

## 2017-07-06 PROCEDURE — 99231 SBSQ HOSP IP/OBS SF/LOW 25: CPT

## 2017-07-06 PROCEDURE — 99233 SBSQ HOSP IP/OBS HIGH 50: CPT

## 2017-07-06 PROCEDURE — 90834 PSYTX W PT 45 MINUTES: CPT

## 2017-07-06 PROCEDURE — 93750 INTERROGATION VAD IN PERSON: CPT

## 2017-07-06 RX ORDER — PANTOPRAZOLE SODIUM 20 MG/1
40 TABLET, DELAYED RELEASE ORAL
Qty: 0 | Refills: 0 | Status: DISCONTINUED | OUTPATIENT
Start: 2017-07-06 | End: 2017-07-17

## 2017-07-06 RX ORDER — POTASSIUM CHLORIDE 20 MEQ
40 PACKET (EA) ORAL ONCE
Qty: 0 | Refills: 0 | Status: COMPLETED | OUTPATIENT
Start: 2017-07-06 | End: 2017-07-06

## 2017-07-06 RX ADMIN — PANTOPRAZOLE SODIUM 10 MG/HR: 20 TABLET, DELAYED RELEASE ORAL at 02:16

## 2017-07-06 RX ADMIN — HEPARIN SODIUM 900 UNIT(S)/HR: 5000 INJECTION INTRAVENOUS; SUBCUTANEOUS at 02:16

## 2017-07-06 RX ADMIN — PANTOPRAZOLE SODIUM 40 MILLIGRAM(S): 20 TABLET, DELAYED RELEASE ORAL at 04:49

## 2017-07-06 RX ADMIN — Medication 0.6 MILLIGRAM(S): at 16:45

## 2017-07-06 RX ADMIN — AMIODARONE HYDROCHLORIDE 200 MILLIGRAM(S): 400 TABLET ORAL at 04:48

## 2017-07-06 RX ADMIN — Medication 40 MILLIEQUIVALENT(S): at 04:49

## 2017-07-06 RX ADMIN — CARVEDILOL PHOSPHATE 3.12 MILLIGRAM(S): 80 CAPSULE, EXTENDED RELEASE ORAL at 04:49

## 2017-07-06 RX ADMIN — Medication 20 MILLIGRAM(S): at 16:48

## 2017-07-06 RX ADMIN — QUETIAPINE FUMARATE 50 MILLIGRAM(S): 200 TABLET, FILM COATED ORAL at 21:07

## 2017-07-06 RX ADMIN — Medication 20 MILLIGRAM(S): at 04:49

## 2017-07-06 RX ADMIN — Medication 100 MILLIGRAM(S): at 16:46

## 2017-07-06 RX ADMIN — CARVEDILOL PHOSPHATE 3.12 MILLIGRAM(S): 80 CAPSULE, EXTENDED RELEASE ORAL at 16:45

## 2017-07-06 NOTE — PROGRESS NOTE ADULT - SUBJECTIVE AND OBJECTIVE BOX
VITAL SIGNS    Telemetry:    Vital Signs Last 24 Hrs  T(C): 36.6 (17 @ 08:00), Max: 36.8 (17 @ 20:00)  T(F): 97.8 (17 @ 08:00), Max: 98.3 (17 @ 20:00)  HR: 82 (17 @ 08:00) (82 - 90)  BP: --  RR: 16 (17 @ 08:00) (16 - 18)  SpO2: 96% (17 @ 08:00) (95% - 98%)             @ 07:01  -   @ 07:00  --------------------------------------------------------  IN: 3521 mL / OUT: 800 mL / NET: 2721 mL     @ 07:  -   @ 11:33  --------------------------------------------------------  IN: 0 mL / OUT: 0 mL / NET: 0 mL       Daily     Daily Weight in k.1 (2017 08:00)  Admit Wt: Drug Dosing Weight  Height (cm): 172.72 (2017 17:13)  Weight (kg): 75 (2017 15:17)  BMI (kg/m2): 25.1 (2017 17:13)  BSA (m2): 1.89 (2017 17:13)      CAPILLARY BLOOD GLUCOSE             Tele: NSR    Drains:    none              MEDICATIONS  docusate sodium 100 milliGRAM(s) Oral three times a day  amiodarone    Tablet 200 milliGRAM(s) Oral daily  colchicine 0.6 milliGRAM(s) Oral daily  QUEtiapine 50 milliGRAM(s) Oral at bedtime  carvedilol 3.125 milliGRAM(s) Oral every 12 hours  furosemide    Tablet 20 milliGRAM(s) Oral two times a day  pantoprazole    Tablet 40 milliGRAM(s) Oral before breakfast      PHYSICAL EXAM    Subjective:   I feel well.   no fever, chills, no abdominal pain  Neurology: alert and oriented x 3, nonfocal, no gross deficits  CV :  RRR   LVAD sounds  Sternal Wound :  CDI , Stable   LVAD dressing CDI  Lungs:  CTA  Abdomen: soft, nontender, nondistended, positive bowel sounds  :     Rosas         [  ]  Extremities:no edema   no calf tenderness B/L     LABS      135  |  102  |  26<H>  ----------------------------<  88  3.9   |  22  |  1.13    Ca    8.3<L>      2017 00:54    TPro  5.7<L>  /  Alb  2.4<L>  /  TBili  1.2  /  DBili  x   /  AST  36  /  ALT  30  /  AlkPhos  131<H>                                   7.8    9.4   )-----------( 357      ( 2017 00:54 )             22.6          PT/INR - ( 2017 00:54 )   PT: 19.5 sec;   INR: 1.78 ratio         PTT - ( 2017 00:54 )  PTT:62.0 sec          PAST MEDICAL & SURGICAL HISTORY:  Ventricular fibrillation: s/p AICD  PAF (paroxysmal atrial fibrillation): on xarelto  Non-Ischemic Cardiomyopathy  SVT (Supraventricular Tachycardia  CHF (Congestive Heart Failure)  Status post left hip replacement  History of Prior Ablation Treatment: for afib  AICD (Automatic Cardioverter/Defibrillator) Present: [employer] with 1 [employer] lead09- explanted and replaced with [employer] 2 leads on 09 VITAL SIGNS    Telemetry:    Vital Signs Last 24 Hrs  T(C): 36.6 (17 @ 08:00), Max: 36.8 (17 @ 20:00)  T(F): 97.8 (17 @ 08:00), Max: 98.3 (17 @ 20:00)  HR: 82 (17 @ 08:00) (82 - 90)  BP: --  RR: 16 (17 @ 08:00) (16 - 18)  SpO2: 96% (17 @ 08:00) (95% - 98%)             @ 07:01  -   @ 07:00  --------------------------------------------------------  IN: 3521 mL / OUT: 800 mL / NET: 2721 mL     @ 07:  -   @ 11:33  --------------------------------------------------------  IN: 0 mL / OUT: 0 mL / NET: 0 mL       Daily     Daily Weight in k.1 (2017 08:00)  Admit Wt: Drug Dosing Weight  Height (cm): 172.72 (2017 17:13)  Weight (kg): 75 (2017 15:17)  BMI (kg/m2): 25.1 (2017 17:13)  BSA (m2): 1.89 (2017 17:13)      CAPILLARY BLOOD GLUCOSE             Tele: NSR    Drains:    none              MEDICATIONS  docusate sodium 100 milliGRAM(s) Oral three times a day  amiodarone    Tablet 200 milliGRAM(s) Oral daily  colchicine 0.6 milliGRAM(s) Oral daily  QUEtiapine 50 milliGRAM(s) Oral at bedtime  carvedilol 3.125 milliGRAM(s) Oral every 12 hours  furosemide    Tablet 20 milliGRAM(s) Oral two times a day  pantoprazole    Tablet 40 milliGRAM(s) Oral before breakfast      PHYSICAL EXAM    Subjective:   I   feel well.   no fever, chills,   no abdominal  discomfort  Neurology: alert and oriented x 3, nonfocal, no gross deficits  CV :  RRR   LVAD sounds  Sternal Wound :  CDI , Stable   LVAD dressing CDI  Lungs:  CTA  Abdomen:soft ND  NT  :  voiding  Extremities:no edema   no calf tenderness B/L     LABS      135  |  102  |  26<H>  ----------------------------<  88  3.9   |  22  |  1.13    Ca    8.3<L>      2017 00:54    TPro  5.7<L>  /  Alb  2.4<L>  /  TBili  1.2  /  DBili  x   /  AST  36  /  ALT  30  /  AlkPhos  131<H>                                   7.8    9.4   )-----------( 357      ( 2017 00:54 )             22.6          PT/INR - ( 2017 00:54 )   PT: 19.5 sec;   INR: 1.78 ratio         PTT - ( 2017 00:54 )  PTT:62.0 sec          PAST MEDICAL & SURGICAL HISTORY:  Ventricular fibrillation: s/p AICD  PAF (paroxysmal atrial fibrillation): on xarelto  Non-Ischemic Cardiomyopathy  SVT (Supraventricular Tachycardia  CHF (Congestive Heart Failure)  Status post left hip replacement  History of Prior Ablation Treatment: for afib  AICD (Automatic Cardioverter/Defibrillator) Present: [employer] with 1 [employer] lead09- explanted and replaced with [employer] 2 leads on 09

## 2017-07-06 NOTE — PROGRESS NOTE ADULT - PROBLEM SELECTOR PLAN 1
diuresis as per CHF team  continue current medications- coreg and amio  hold coumadin/ heparin gttp  today 7/6  for push enteroscopy

## 2017-07-06 NOTE — PROGRESS NOTE ADULT - SUBJECTIVE AND OBJECTIVE BOX
Interval History:    Medications:  docusate sodium 100 milliGRAM(s) Oral three times a day  amiodarone    Tablet 200 milliGRAM(s) Oral daily  colchicine 0.6 milliGRAM(s) Oral daily  QUEtiapine 50 milliGRAM(s) Oral at bedtime  carvedilol 3.125 milliGRAM(s) Oral every 12 hours  furosemide    Tablet 20 milliGRAM(s) Oral two times a day  pantoprazole    Tablet 40 milliGRAM(s) Oral before breakfast    Vitals:  T(C): 36.6 (17 @ 08:00), Max: 36.8 (17 @ 20:00)  HR: 82 (17 @ 08:00) (82 - 90)  BP(mean): 84 (17 @ 08:00) (80 - 100  Daily     Daily Weight in k.1 (2017 08:00)    I&O's Summary    2017 07:  -  2017 07:00  --------------------------------------------------------  IN: 3521 mL / OUT: 800 mL / NET: 2721 mL    2017 07:  -  2017 10:11  --------------------------------------------------------  IN: 0 mL / OUT: 0 mL / NET: 0 mL        Physical Exam:  Appearance: No Acute Distress  HEENT:   Normal oral mucosa, PERRL, EOMI	  Cardiovascular: Normal S1 S2, No JVD, LVAD hum  Respiratory: Clear to auscultation bilaterally  Gastrointestinal:  Soft, Non-tender	  Skin: No cyanosis	  Neurologic: Non-focal  Extremities: No clubbing, cyanosis or edema  Vascular: 2+ DP/PT pulses bilateral  Psychiatry: A & O x 3, Mood & affect appropriate    LVAD Interrogation:  Pump Flow:  Pump Speed:  Pulse Index:  Pump Power:  VAD Events:   Driveline evaluation:    Programming Changes: [ ] No changes made [ ] Changes made:  Labs:                        7.8    9.4   )-----------( 357      ( 2017 00:54 )             22.6         135  |  102  |  26<H>  ----------------------------<  88  3.9   |  22  |  1.13    Ca    8.3<L>      2017 00:54    TPro  5.7<L>  /  Alb  2.4<L>  /  TBili  1.2  /  DBili  x   /  AST  36  /  ALT  30  /  AlkPhos  131<H>      PT/INR - ( 2017 00:54 )   PT: 19.5 sec;   INR: 1.78 ratio         PTT - ( 2017 00:54 )  PTT:62.0 sec          Lactate Dehydrogenase, Serum: 229 U/L ( @ 00:54)  Lactate Dehydrogenase, Serum: 238 U/L ( @ 03:53)  Lactate Dehydrogenase, Serum: 242 U/L ( @ 03:44)          TELEMETRY: 	    ECG:      [ ] Echocardiogram:

## 2017-07-06 NOTE — PROGRESS NOTE ADULT - PROBLEM SELECTOR PLAN 1
hemodynamically mediated in setting of decompensated heart failure +/- urinary retention component and hypotension.  Pt cr impoved.  Pt with  good urine output.  Monitor BMP  Strict I/O, avoid nephrotoxics, NSAIDs. Daily weights

## 2017-07-06 NOTE — PROGRESS NOTE ADULT - ASSESSMENT
68y/o M with advanced NICM BiV HFrEF 15-20% on a stable off milrinones/p AICD, with replacement from [employer] to [employer] (2009), PAF on AC and h/o VT, with recent admission for AICD firing, with decompensated heart failure s/p LVAD on 6/12 post op with VT and NORMAN. Patient now with drop in Hgb. s/p EGD and capsule endoscopy. EGD showed  small superficial gastric ulcer, unlikely source of bleed. Video capsule endoscopy reviewed and shows active bleeding in proximal small intestine. Plan for enteroscopy

## 2017-07-06 NOTE — PROGRESS NOTE ADULT - ASSESSMENT
Overall appears to be coping well with adjustment to LVAD, doing well with LVAD education and management.   Close friend is primary support person (Tahira).  Sleep interrupted last night due to worries about today's procedure (small bowel enteroscopy), feeling relieved post procedure.  Receptive to support and validation.      Recommendations:   Review all treatment recommendations and LVAD information with teach back   Behavioral Cardiology will continue to follow to facilitate adjustment to LVAD

## 2017-07-06 NOTE — PROGRESS NOTE ADULT - SUBJECTIVE AND OBJECTIVE BOX
[facility] DIVISION OF KIDNEY DISEASES AND HYPERTENSION -- FOLLOW UP NOTE  --------------------------------------------------------------------------------  Chief Complaint:    24 hour events/subjective:  Patient had EGD and now with capsule endoscopy done on 7/4. No new complaints feels well.       PAST HISTORY  --------------------------------------------------------------------------------  No significant changes to PMH, PSH, FHx, SHx, unless otherwise noted    ALLERGIES & MEDICATIONS  --------------------------------------------------------------------------------  Allergies    No Known Allergies    Intolerances      Standing Inpatient Medications  docusate sodium 100 milliGRAM(s) Oral three times a day  amiodarone    Tablet 200 milliGRAM(s) Oral daily  colchicine 0.6 milliGRAM(s) Oral daily  QUEtiapine 50 milliGRAM(s) Oral at bedtime  carvedilol 3.125 milliGRAM(s) Oral every 12 hours  pantoprazole Infusion 8 mG/Hr IV Continuous <Continuous>  furosemide    Tablet 40 milliGRAM(s) Oral two times a day    PRN Inpatient Medications      REVIEW OF SYSTEMS  --------------------------------------------------------------------------------  Review Of Systems:  Constitutional: [ ] Fever [ ] Chills [ ] Fatigue [ ] Weight change   HEENT: [ ] Blurred vision [ ] Eye Pain [ ] Headache [ ] Runny nose [ ] Sore Throat   Respiratory: [ ] Cough [ ] Wheezing [ ] Shortness of breath  Cardiovascular: [ ] Chest Pain [ ] Palpitations [ ] LOBATO [ ] PND [ ] Orthopnea  Gastrointestinal: [ ] Abdominal Pain [ ] Diarrhea [ ] Constipation [ ] Hemorrhoids [ ] Nausea [ ] Vomiting  Genitourinary: [ ] Nocturia [ ] Dysuria [ ] Incontinence  Extremities: [ ] Swelling [ ] Joint Pain  Neurologic: [ ] Focal deficit [ ] Paresthesias [ ] Syncope  Lymphatic: [ ] Swelling [ ] Lymphadenopathy   Skin: [ ] Rash [ ] Ecchymoses [ ] Wounds [ ] Lesions  Psychiatry: [ ] Depression [ ] Suicidal/Homicidal Ideation [ ] Anxiety [ ] Sleep Disturbances  [x ] 10 point review of systems is otherwise negative except as mentioned above     [ ]Unable to obtain    All other systems were reviewed and are negative, except as noted.    VITALS/PHYSICAL EXAM  --------------------------------------------------------------------------------  T(C): 36.6 (07-03-17 @ 04:36), Max: 36.8 (07-02-17 @ 18:30)  HR: 78 (07-03-17 @ 04:36) (78 - 93)  BP: --  RR: 18 (07-03-17 @ 04:36) (18 - 18)  SpO2: 98% (07-03-17 @ 04:36) (96% - 98%)  Wt(kg): --    Admission Weight  Height (cm): 172.72 (11 Jun 2017 17:13)  Weight (kg): 75 (11 Jun 2017 15:17)  BMI (kg/m2): 25.1 (11 Jun 2017 17:13)  BSA (m2): 1.89 (11 Jun 2017 17:13)    07-02-17 @ 07:01  -  07-03-17 @ 07:00  --------------------------------------------------------  IN: 1360 mL / OUT: 1625 mL / NET: -265 mL    07-03-17 @ 07:01  -  07-03-17 @ 09:52  --------------------------------------------------------  IN: 360 mL / OUT: 500 mL / NET: -140 mL        Physical Exam:  	Gen: NAD,  	HEENT: PERRL,   	Pulm: CTA B/L  	CV: RRR, S1S2;  	Back:no sacral edema  	Abd: , soft, nontender/nondistended  	UE:; no asterixis  	LE: ; no edema  	Neuro: No focal deficits,   	Psych: Normal affect and mood  	Skin: Warm, without rashes      LABS/STUDIES  --------------------------------------------------------------------------------              7.9    14.0  >-----------<  398      [07-03-17 @ 03:48]              23.8     134  |  98  |  36  ----------------------------<  96      [07-03-17 @ 03:48]  4.0   |  24  |  1.22        Ca     8.6     [07-03-17 @ 03:48]    TPro  5.5  /  Alb  2.4  /  TBili  1.2  /  DBili  x   /  AST  50  /  ALT  33  /  AlkPhos  141  [07-02-17 @ 03:22]    PT/INR: PT 20.6 , INR 1.87       [07-03-17 @ 03:48]  PTT: 35.4       [07-03-17 @ 03:48]          [07-03-17 @ 03:48]    Creatinine Trend:  SCr 1.22 [07-03 @ 03:48]  SCr 1.40 [07-02 @ 03:22]  SCr 1.52 [07-01 @ 03:48]  SCr 1.72 [06-30 @ 03:46]  SCr 1.72 [06-29 @ 05:07]    Urinalysis - [06-21-17 @ 18:17]      Color Yellow / Appearance  / SG 1.010 / pH 7.0      Gluc Negative / Ketone Negative  / Bili Negative / Urobili 4       Blood Small / Protein 30 / Leuk Est Moderate / Nitrite Negative      RBC 3-5 / WBC 6-10 / Hyaline  / Gran  / Sq Epi  / Non Sq Epi OCC / Bacteria Few    Urine Creatinine 69      [06-30-17 @ 19:50]  Urine Protein 37      [06-30-17 @ 19:50]    Iron 20, TIBC 352, %sat 6      [06-08-17 @ 07:14]  Ferritin 121.0      [06-09-17 @ 19:08]  HbA1c 5.5      [06-07-17 @ 06:14]  TSH 2.33      [06-14-17 @ 14:51]  Lipid: chol 62, TG 33, HDL 17, LDL 38      [06-07-17 @ 06:14]      Free Light Chains: kappa 16.30, lambda 11.10, ratio = 1.47      [06-27 @ 15:50]      RADIOLOGY  ---------------------------------------------------------------------------------

## 2017-07-06 NOTE — PROGRESS NOTE ADULT - PROBLEM SELECTOR PLAN 4
Will hold aspirin and coumadin   PRBC today  no active s/s of bleeding noted   NPO today for Enteroscopy push study with GI  continue protonix gttp as per GI

## 2017-07-06 NOTE — PROGRESS NOTE ADULT - ATTENDING COMMENTS
s/p capsule study showing two duodenal lesions.   s/p clipping   heparin d/c prior to procedure and GI requests off AC till tomorrow.  s/p 3 units this admission. last this morning.  Hb 7.8 Hct 22.     No PI events.   coumadin no ASA tomorrow.  Marvin Campbell

## 2017-07-06 NOTE — PROGRESS NOTE ADULT - ATTENDING COMMENTS
NORMAN on CKD post LVAD: Improving rapidly,   SPEP/SIFE/ Free light chain ratio unremarkable   Volume status significantly improved with diuresis. Lasix management per heart failure  Anemia: +stool guaic. On  protonix chris Lindo

## 2017-07-06 NOTE — PROGRESS NOTE ADULT - PROBLEM SELECTOR PLAN 2
No programming changes  Continue to hold aspirin, coum and hep gtt while GI studys ongoinh for bleed source  discharge planning- eventually rehab

## 2017-07-06 NOTE — PROGRESS NOTE ADULT - ASSESSMENT
67 year old man with ACC/AHA stage D congestive heart failure with severely reduced LV systolic function due to a nonischemic dilated cardiomyopathy s/p HM2 LVAD on 6/12 with post-operative course complicated by self-terminating ventricular tachycardia and acute renal failure, which is improved and stable. He continues to improve functionally off of milrinone.  acute blood loss anemia postop requiring multiple PRBC transfusions and 6/27 +guiac- GI following DR. James  pt placed on Protonix gttp  7/3 anticoagulation on Hold as Per CHF team    7/3 EGD: non-bleeding gastric bubble  with Dr. James   7/4 capsule placed and now retrieved / continue protonix gttp; h/h stable 8/26 today   7/5   PRBC x 1, NPO   Push enteroscopy today for further evaluation at small intestine

## 2017-07-06 NOTE — CHART NOTE - NSCHARTNOTEFT_GEN_A_CORE
[facility] Procedure Report - Full note to follow on sunrise     Small bowel enteroscopy     Indication: GI bleed, Small bowel capsule showing blood in proximal small bowel     Findings:  Two angiodysplastic lesions with no bleeding were found in the proximal jejunum.  Coagulation for hemostasis using argon plasma at 2 liters/minute and 20 raymond was successful.  For hemostasis, two hemostatic clips were successfully placed (MR conditional).  There was no bleeding at the end of the procedure.     Impression:  Two non-bleeding angiodysplastic lesions in the jejunum.  Treated with argon plasma coagulation (APC).  Clips (MR conditional) were placed.    Recommendations:   - Return patient to [facility] for [facility] care.   - Resume previous diet.   - Monitor H/H, transfuse accordingly.  - Would hold anticoagulation for 24 hours.

## 2017-07-07 ENCOUNTER — APPOINTMENT (OUTPATIENT)
Dept: ELECTROPHYSIOLOGY | Facility: CLINIC | Age: 67
End: 2017-07-07

## 2017-07-07 LAB
ANION GAP SERPL CALC-SCNC: 11 MMOL/L — SIGNIFICANT CHANGE UP (ref 5–17)
APTT BLD: 32.8 SEC — SIGNIFICANT CHANGE UP (ref 27.5–37.4)
BUN SERPL-MCNC: 23 MG/DL — SIGNIFICANT CHANGE UP (ref 7–23)
CALCIUM SERPL-MCNC: 8.4 MG/DL — SIGNIFICANT CHANGE UP (ref 8.4–10.5)
CHLORIDE SERPL-SCNC: 102 MMOL/L — SIGNIFICANT CHANGE UP (ref 96–108)
CO2 SERPL-SCNC: 22 MMOL/L — SIGNIFICANT CHANGE UP (ref 22–31)
CREAT SERPL-MCNC: 1.27 MG/DL — SIGNIFICANT CHANGE UP (ref 0.5–1.3)
GLUCOSE SERPL-MCNC: 97 MG/DL — SIGNIFICANT CHANGE UP (ref 70–99)
HAPTOGLOB SERPL-MCNC: 121 MG/DL — SIGNIFICANT CHANGE UP (ref 34–200)
HCT VFR BLD CALC: 24.7 % — LOW (ref 39–50)
HCT VFR BLD CALC: 25 % — LOW (ref 39–50)
HGB BLD-MCNC: 8.3 G/DL — LOW (ref 13–17)
HGB BLD-MCNC: 8.4 G/DL — LOW (ref 13–17)
INR BLD: 1.66 RATIO — HIGH (ref 0.88–1.16)
LDH SERPL L TO P-CCNC: 237 U/L — SIGNIFICANT CHANGE UP (ref 50–242)
MCHC RBC-ENTMCNC: 31.4 PG — SIGNIFICANT CHANGE UP (ref 27–34)
MCHC RBC-ENTMCNC: 31.5 PG — SIGNIFICANT CHANGE UP (ref 27–34)
MCHC RBC-ENTMCNC: 33.5 GM/DL — SIGNIFICANT CHANGE UP (ref 32–36)
MCHC RBC-ENTMCNC: 33.5 GM/DL — SIGNIFICANT CHANGE UP (ref 32–36)
MCV RBC AUTO: 93.6 FL — SIGNIFICANT CHANGE UP (ref 80–100)
MCV RBC AUTO: 94 FL — SIGNIFICANT CHANGE UP (ref 80–100)
PLATELET # BLD AUTO: 334 K/UL — SIGNIFICANT CHANGE UP (ref 150–400)
PLATELET # BLD AUTO: 349 K/UL — SIGNIFICANT CHANGE UP (ref 150–400)
POTASSIUM SERPL-MCNC: 4.5 MMOL/L — SIGNIFICANT CHANGE UP (ref 3.5–5.3)
POTASSIUM SERPL-SCNC: 4.5 MMOL/L — SIGNIFICANT CHANGE UP (ref 3.5–5.3)
PROTHROM AB SERPL-ACNC: 18.1 SEC — HIGH (ref 9.8–12.7)
RBC # BLD: 2.63 M/UL — LOW (ref 4.2–5.8)
RBC # BLD: 2.66 M/UL — LOW (ref 4.2–5.8)
RBC # FLD: 16.9 % — HIGH (ref 10.3–14.5)
RBC # FLD: 17.4 % — HIGH (ref 10.3–14.5)
SODIUM SERPL-SCNC: 135 MMOL/L — SIGNIFICANT CHANGE UP (ref 135–145)
WBC # BLD: 11.7 K/UL — HIGH (ref 3.8–10.5)
WBC # BLD: 13.7 K/UL — HIGH (ref 3.8–10.5)
WBC # FLD AUTO: 11.7 K/UL — HIGH (ref 3.8–10.5)
WBC # FLD AUTO: 13.7 K/UL — HIGH (ref 3.8–10.5)

## 2017-07-07 PROCEDURE — 99233 SBSQ HOSP IP/OBS HIGH 50: CPT

## 2017-07-07 PROCEDURE — 93750 INTERROGATION VAD IN PERSON: CPT

## 2017-07-07 PROCEDURE — 90832 PSYTX W PT 30 MINUTES: CPT

## 2017-07-07 PROCEDURE — 99232 SBSQ HOSP IP/OBS MODERATE 35: CPT | Mod: GC

## 2017-07-07 RX ORDER — WARFARIN SODIUM 2.5 MG/1
5 TABLET ORAL ONCE
Qty: 0 | Refills: 0 | Status: COMPLETED | OUTPATIENT
Start: 2017-07-07 | End: 2017-07-07

## 2017-07-07 RX ADMIN — Medication 20 MILLIGRAM(S): at 05:01

## 2017-07-07 RX ADMIN — QUETIAPINE FUMARATE 50 MILLIGRAM(S): 200 TABLET, FILM COATED ORAL at 21:48

## 2017-07-07 RX ADMIN — PANTOPRAZOLE SODIUM 40 MILLIGRAM(S): 20 TABLET, DELAYED RELEASE ORAL at 08:49

## 2017-07-07 RX ADMIN — CARVEDILOL PHOSPHATE 3.12 MILLIGRAM(S): 80 CAPSULE, EXTENDED RELEASE ORAL at 17:12

## 2017-07-07 RX ADMIN — Medication 0.6 MILLIGRAM(S): at 11:27

## 2017-07-07 RX ADMIN — Medication 100 MILLIGRAM(S): at 21:48

## 2017-07-07 RX ADMIN — Medication 20 MILLIGRAM(S): at 17:12

## 2017-07-07 RX ADMIN — CARVEDILOL PHOSPHATE 3.12 MILLIGRAM(S): 80 CAPSULE, EXTENDED RELEASE ORAL at 05:02

## 2017-07-07 RX ADMIN — WARFARIN SODIUM 5 MILLIGRAM(S): 2.5 TABLET ORAL at 21:48

## 2017-07-07 RX ADMIN — AMIODARONE HYDROCHLORIDE 200 MILLIGRAM(S): 400 TABLET ORAL at 05:01

## 2017-07-07 RX ADMIN — Medication 100 MILLIGRAM(S): at 05:01

## 2017-07-07 RX ADMIN — Medication 100 MILLIGRAM(S): at 13:15

## 2017-07-07 NOTE — PROVIDER CONTACT NOTE (OTHER) - RECOMMENDATIONS
ABG/mixed sent
Continue to monitor. ABG just sent an hour ago, K WDL. Re-send with morning labs.
Inform provider
will continue to monitor
Check BMP, administer potassium or magnesium if needed.
Pending Order for Benadryl PO to be given to patient,.
send blood to check CBC and Bmp

## 2017-07-07 NOTE — PROGRESS NOTE ADULT - SUBJECTIVE AND OBJECTIVE BOX
Interval History: S/p push enteroscopy yesterday with APC and clipping of 2 angioectasias in proximal jejunum. Last PRBC given .     Medications:  docusate sodium 100 milliGRAM(s) Oral three times a day  amiodarone    Tablet 200 milliGRAM(s) Oral daily  colchicine 0.6 milliGRAM(s) Oral daily  QUEtiapine 50 milliGRAM(s) Oral at bedtime  carvedilol 3.125 milliGRAM(s) Oral every 12 hours  furosemide    Tablet 20 milliGRAM(s) Oral two times a day  pantoprazole    Tablet 40 milliGRAM(s) Oral before breakfast    Vitals:  T(C): 36.8 (17 @ 03:00), Max: 36.8 (17 @ 23:00)  HR: 83 (17 @ 07:00) (83 - 98)  BP: --  BP(mean): 70 (17 @ 07:00) (70 - 85)  ABP: --  ABP(mean): --  RR: 18 (17 @ 03:00) (15 - 18)  SpO2: 100% (17 @ 03:00) (96% - 100%)  Wt(kg): --  CVP(cm H2O): --    Daily     Daily Weight in k (2017 06:00)    I&O's Summary    2017 07:  -  2017 07:00  --------------------------------------------------------  IN: 1320 mL / OUT: 1675 mL / NET: -355 mL    2017 07:  -  2017 11:03  --------------------------------------------------------  IN: 240 mL / OUT: 0 mL / NET: 240 mL    Physical Exam:  Appearance: No Acute Distress  HEENT: No JVD  Cardiovascular: Normal S1 S2, No murmurs/rubs/gallops  Respiratory: Clear to auscultation bilaterally  Gastrointestinal: Soft, Non-tender	  Skin: No cyanosis	  Neurologic: Non-focal  Extremities: No LE edema  Psychiatry: A & O x 3, Mood & affect appropriate    LVAD Interrogation:  Pump Flow:  Pump Speed:  Pulse Index:  Pump Power:  VAD Events:   Driveline evaluation:    Programming Changes: No changes made    Labs:                        8.3    13.7  )-----------( 349      ( 2017 01:15 )             24.7         135  |  102  |  23  ----------------------------<  97  4.5   |  22  |  1.27    Ca    8.4      2017 01:15    TPro  6.8  /  Alb  3.1<L>  /  TBili  1.6<H>  /  DBili  x   /  AST  41<H>  /  ALT  31  /  AlkPhos  140<H>      PT/INR - ( 2017 01:15 )   PT: 18.1 sec;   INR: 1.66 ratio         PTT - ( 2017 01:15 )  PTT:32.8 sec    Lactate Dehydrogenase, Serum: 237 U/L ( @ 01:15)  Lactate Dehydrogenase, Serum: 229 U/L ( @ 00:54)  Lactate Dehydrogenase, Serum: 238 U/L ( @ 03:53)    TELEMETRY:    [ ] Echocardiogram:  < from: Transthoracic Echocardiogram (17 @ 10:09) >  Conclusions:  s/p LVAD r/o Pericatdial effusion. 9200 rpm, MAP 73  1. The aortic valve is remains predominately closed with  every cycle.  Minimal aortic regurgitation.  2. Mildly dilated left atrium.  LA volume index = 37 cc/m2.  3. Severe segmental left ventricular systolic dysfunction.  LVAD cannula seen at the apex with laminar color flow seen  with velocities of less than 1m/sec.  4. Right ventricular enlargement with decreased right  ventricular systolic function. A device wire is noted in  the right heart.  5. Normal tricuspid valve. Moderate tricuspid  regurgitation.  6. Estimated pulmonary artery systolic pressure equals 39  mm Hg, assuming right atrial pressure equals 8 mm Hg,  consistent with borderline pulmonary pressures.  7. Normal pericardium with no pericardial effusion.    < end of copied text >

## 2017-07-07 NOTE — PROGRESS NOTE ADULT - SUBJECTIVE AND OBJECTIVE BOX
Im doing better    VITAL SIGNS    Telemetry:  NSR 90    Vital Signs Last 24 Hrs  T(C): 36.8 (17 @ 03:00), Max: 36.8 (17 @ 23:00)  T(F): 98.2 (17 @ 03:00), Max: 98.2 (17 @ 23:00)  HR: 83 (17 @ 07:00) (83 - 98)  BP: --  mean 70-85  RR: 18 (17 @ 03:00) (15 - 18)  SpO2: 100% (17 @ 03:00) (96% - 100%)             I&O's Detail    2017 07:01  -  2017 07:00  --------------------------------------------------------  IN:    Oral Fluid: 1000 mL    Packed Red Blood Cells: 320 mL  Total IN: 1320 mL    OUT:    Voided: 1675 mL  Total OUT: 1675 mL    Total NET: -355 mL             @ 07:01  -   @ 07:00  --------------------------------------------------------  IN: 1320 mL / OUT: 1675 mL / NET: -355 mL          Daily     Daily Weight in k (2017 06:00)  Admit Wt: Drug Dosing Weight  Height (cm): 172.72 (2017 17:13)  Weight (kg): 75 (2017 15:17)  BMI (kg/m2): 25.1 (2017 17:13)  BSA (m2): 1.89 (2017 17:13)    Bilirubin Total, Serum: 1.6 mg/dL ( @ 14:55)    CAPILLARY BLOOD GLUCOSE              Drains: no      Pacing Wires   no       Coumadin    [x ] YES          [  ]      NO         Reason:     lvad, was on hold for GI w/u                      MEDICATIONS  docusate sodium 100 milliGRAM(s) Oral three times a day  amiodarone    Tablet 200 milliGRAM(s) Oral daily  colchicine 0.6 milliGRAM(s) Oral daily  QUEtiapine 50 milliGRAM(s) Oral at bedtime  carvedilol 3.125 milliGRAM(s) Oral every 12 hours  furosemide    Tablet 20 milliGRAM(s) Oral two times a day  pantoprazole    Tablet 40 milliGRAM(s) Oral before breakfast      PHYSICAL EXAM        Neurology: alert and oriented x 3, nonfocal, no gross deficits    CV :  + hum from lvad    Sternal Wound :  CDI , Stable    Lungs: cta    Abdomen: soft, nontender, nondistended, positive bowel sounds , +bm, driveline dressing cdi    :           voiding    Extremities:       - edema, negative calve tenderness,                LABS      135  |  102  |  23  ----------------------------<  97  4.5   |  22  |  1.27    Ca    8.4      2017 01:15    TPro  6.8  /  Alb  3.1<L>  /  TBili  1.6<H>  /  DBili  x   /  AST  41<H>  /  ALT  31  /  AlkPhos  140<H>                                   8.3    13.7  )-----------( 349      ( 2017 01:15 )             24.7          PT/INR - ( 2017 01:15 )   PT: 18.1 sec;   INR: 1.66 ratio         PTT - ( 2017 01:15 )  PTT:32.8 sec                  PAST MEDICAL & SURGICAL HISTORY:  Ventricular fibrillation: s/p AICD  PAF (paroxysmal atrial fibrillation): on xarelto  Non-Ischemic Cardiomyopathy  SVT (Supraventricular Tachycardia)  HTN  CHF (Congestive Heart Failure)  Status post left hip replacement  History of Prior Ablation Treatment: for afib  AICD (Automatic Cardioverter/Defibrillator) Present: [employer] with 1 [employer] lead09- explanted and replaced with [employer] 2 leads on 09             Physical Therapy Rec:   Home  [  ]   Home w/ PT  [  ]  Rehab  [x  ]    Discussed with Cardiothoracic Team at AM rounds.

## 2017-07-07 NOTE — PROGRESS NOTE ADULT - ASSESSMENT
ASSESSMENT  1. Nonischemic dilated cardiomyopathy s/p HM2 LVAD on 6/12  2. Acute blood loss anemia sec to GI bleeding, source undetermined  3. Hx of gastric ulcers  4. Hx of VT    PLAN  1. S/p EGD with APC/clipping x 2.   2. Holding A/C for 24 hours as per GI reccs, appreciate care and then will restart AC. LDH acceptable currently.   3. Serial H/H. Keep Hgb > 8.0, would recheck again in evening and if <8, transfuse 2 units.   4. Hold ASA.  5. c/w PPI IV.  6. c/w Amiodarone 200 mg daily, Coreg 3.125 mg bid and PO Lasix 20 mg daily.      Gaurav Neely MD  [id number] ASSESSMENT  1. Nonischemic dilated cardiomyopathy s/p HM2 LVAD on 6/12  2. Acute blood loss anemia sec to GI bleeding, source undetermined  3. Hx of gastric ulcers  4. Hx of VT    PLAN  1. S/p EGD with APC/clipping x 2.   2. Holding A/C for 24 hours as per GI reccs, appreciate care and then will restart AC. LDH acceptable currently.   3. Serial H/H. Keep Hgb > 8.0, would recheck again in evening and if <8, transfuse 2 units.   4. Hold ASA.  5. c/w PPI IV.  6. c/w Amiodarone 200 mg daily, Coreg 3.125 mg bid and PO Lasix 20 mg daily.      Please page me at  for any further questions up till 5 pm, [phone] for covering on call fellow after 5 pm

## 2017-07-07 NOTE — CHART NOTE - NSCHARTNOTEFT_GEN_A_CORE
Pt with end stage cardiomyopathy, cardiogenic shock s/p LVAD, NORMAN on CKD post LVAD- renal function continues to improve; now with acute blood loss anemia requiring multiple PRBC transfusions, +guaiac,  s/p capsule endoscopy with 2 duodenal ulcers, s/p push enteroscopy as per chart.     Pt able to teach-back points of nutrition therapy for CHF and Coumadin/Vitamin K interaction, pt receptive to further review and requested another copy of Taking Warfarin Safely booklet to review vitamin K content of foods.     Source: Patient [ x ]    Family [ ]     other [ x ] RN, Comprehensive chart review     Diet : DASH diet     PO intake:  < 50% [ ] 50-75% [ ]   % [ x ]  other :    Pt reports very good appetite and denies nausea/vomiting/diarrhea/constipation. Pt with no difficulty chewing/swallowing.     Admission Weight: 72.9kg  Current Weight: 65kg  Weight fluctuations noted, suspect related to fluid shifts. No edema noted at this time.    Pertinent Medications: MEDICATIONS  (STANDING):  docusate sodium 100 milliGRAM(s) Oral three times a day  amiodarone    Tablet 200 milliGRAM(s) Oral daily  colchicine 0.6 milliGRAM(s) Oral daily  QUEtiapine 50 milliGRAM(s) Oral at bedtime  carvedilol 3.125 milliGRAM(s) Oral every 12 hours  furosemide    Tablet 20 milliGRAM(s) Oral two times a day  pantoprazole    Tablet 40 milliGRAM(s) Oral before breakfast    MEDICATIONS  (PRN):    Pertinent Labs:    Hemoglobin: 8.3 g/dL (07.07.17 @ 01:15) - low  Hematocrit: 24.7 % (07.07.17 @ 01:15) - low  Basic Metabolic Panel (07.07.17 @ 01:15)    Sodium, Serum: 135 mmol/L    Potassium, Serum: 4.5 mmol/L    Chloride, Serum: 102 mmol/L    Carbon Dioxide, Serum: 22 mmol/L    Anion Gap, Serum: 11 mmol/L    Blood Urea Nitrogen, Serum: 23 mg/dL    Creatinine, Serum: 1.27 mg/dL    Glucose, Serum: 97 mg/dL    Calcium, Total Serum: 8.4 mg/dL   Lactate Dehydrogenase, Serum: 237 U/L (07.07.17 @ 01:15)  Albumin, Serum: 3.1 g/dL (07.06.17 @ 14:55) - low  Bilirubin Total, Serum: 1.6 mg/dL (07.06.17 @ 14:55) - high  Alkaline Phosphatase, Serum: 140 U/L (07.06.17 @ 14:55) - high  Aspartate Aminotransferase (AST/SGOT): 41 U/L (07.06.17 @ 14:55) - high  Alanine Aminotransferase (ALT/SGPT): 31 U/L RC (07.06.17 @ 14:55)       Skin: No pressure ulcers noted.    Estimated Needs:   [ x ] no change since previous assessment  [ ] recalculated:       Previous Nutrition Diagnosis:   Food & Nutrition Related Knowledge Deficit   Malnutrition          Nutrition Diagnosis is [ x ] ongoing  [ ] resolved [ ] not applicable   Food and nutrition related knowledge deficit improving with continued education.  Malnutrition improving with improved PO intake.        Interventions:     Recommend    [ ] Change Diet To:    [ x ] Nutrition Supplement: Recommend Ensure Enlive 1 can/day to optimize PO intake.    [ ] Nutrition Support    [ x ] Other: 1. Encourage PO intake with all meals. 2. Provide food preferences within therapeutic diet when requested. 3. Encourage use of alternate menu options as needed. 4. Reviewed nutrition therapy for CHF and Coumadin/Vitamin K interaction, encouraged compliance.        Monitoring and Evaluation:     [ x ] PO intake [ x ] Tolerance to diet prescription [ x ] weights [ x ] follow up per protocol    [ ] other:

## 2017-07-07 NOTE — PROGRESS NOTE ADULT - ASSESSMENT
68y/o M with advanced NICM BiV HFrEF 15-20% on a stable off milrinones/p AICD, with replacement from [employer] to [employer] (2009), PAF on AC and h/o VT, with recent admission for AICD firing, with decompensated heart failure s/p LVAD on 6/12 post op with VT and NORMAN. Patient now with drop in Hgb. s/p EGD and capsule endoscopy. EGD showed  small superficial gastric ulcer, unlikely source of bleed. Video capsule endoscopy reviewed and shows active bleeding in proximal small intestine.  s/p push enteroscopy with APC and clipping of 2 angioectasias in proximal jejunum.Last PRBC given 7/5.

## 2017-07-07 NOTE — PROVIDER CONTACT NOTE (OTHER) - ASSESSMENT
Vital signs stable. HR 85, MAP 85, RR 18, SPO2 100 on room air. Serum potassium is within normal range.

## 2017-07-07 NOTE — PROVIDER CONTACT NOTE (OTHER) - DATE AND TIME:
02-Jul-2017 23:40
06-Jul-2017 14:15
05-Jun-2017 03:54
06-Jun-2017 18:10
07-Jul-2017 05:15
10-Kennedy-2017 13:15
15-Kennedy-2017 22:15
19-Jun-2017 23:35
21-Jun-2017 19:30
22-Jun-2017 02:36
22-Jun-2017 03:30

## 2017-07-07 NOTE — PROGRESS NOTE ADULT - ASSESSMENT
67 year old man with ACC/AHA stage D congestive heart failure with severely reduced LV systolic function due to a nonischemic dilated cardiomyopathy s/p HM2 LVAD on 6/12 with post-operative course complicated by self-terminating ventricular tachycardia and acute renal failure, which is improved and stable. He continues to improve functionally off of milrinone.  acute blood loss anemia postop requiring multiple PRBC transfusions and 6/27 +guiac- GI following DR. James  pt placed on Protonix gttp  7/3 anticoagulation on Hold as Per CHF team    7/3 EGD: non-bleeding small superficial gastric ulcer   7/4 capsule placed and retrieved / continue protonix gttp; h/h stable 8/26 today   7/5   PRBC x 1, NPO   Push enteroscopy today for further evaluation at small intestine  7/6 Small bowel capsule showing blood in proximal small bowel.  Two non-bleeding angiodysplastic lesions in the jejunum.  Treated with argon plasma coagulation (APC).  Clips  were placed

## 2017-07-07 NOTE — PROVIDER CONTACT NOTE (OTHER) - SITUATION
pt had 16 beats of wide complex tachycardia
Pt is due for lasix 40mg IVP BP is 94/77
Patient returned from cath lab with IMPELLA in right groin site, pedal pulses absent, feet cool to touch. Prior to procedure pt had +1 pulses.
Pt had 5 beats of wide complex at 0508.
Pt had 6 beats of VT in a row
Pt's H.H on AM labs is 8/24.4
Pt's po2 is 71 on ABG, pt currently on 6 L N/C, sat on ABG is 96%
Pt. c/o of itching around the midsternal incision
Pt. was seen by HF team this PM and recommended to tx to CCU
Upon assessment and during necessary tasks for care patient is very rude, hostile, disrespectful to nurses and staff. Pt uncooperative, very difficult to perform necessary tasks.
cardiac index 1.3
pt had 6 beats wide complex tachycardia on telemonitor

## 2017-07-07 NOTE — PROVIDER CONTACT NOTE (OTHER) - BACKGROUND
s/p LVAD
see h&p
LVAD implanted 6/12.
Pt is s/p LVAD
Pt s/p LVAD from 6/12
Pt s/p LVAD from 6/12
Pt s/p LVAD implantation, has been having significant ectopy since SX.
Pt s/p LVAD placement from 6/12
Pt. s/p LVAD 6/12  s/p 1 unit of PRBC on 7/2/17 blood transfusion finish at 2110. Pt. also  on protonix gtt
admitted with CHF

## 2017-07-07 NOTE — PROGRESS NOTE ADULT - SUBJECTIVE AND OBJECTIVE BOX
[facility] DIVISION OF KIDNEY DISEASES AND HYPERTENSION -- FOLLOW UP NOTE  --------------------------------------------------------------------------------  Chief Complaint:    24 hour events/subjective:  7/6 s/p push enteroscopy with APC and clipping of 2 angioectasias in proximal jejunum.Last PRBC given 7/5. Patient feels well overall.      PAST HISTORY  --------------------------------------------------------------------------------  No significant changes to PMH, PSH, FHx, SHx, unless otherwise noted    ALLERGIES & MEDICATIONS  --------------------------------------------------------------------------------  Allergies    No Known Allergies    Intolerances      Standing Inpatient Medications  docusate sodium 100 milliGRAM(s) Oral three times a day  amiodarone    Tablet 200 milliGRAM(s) Oral daily  colchicine 0.6 milliGRAM(s) Oral daily  QUEtiapine 50 milliGRAM(s) Oral at bedtime  carvedilol 3.125 milliGRAM(s) Oral every 12 hours  furosemide    Tablet 20 milliGRAM(s) Oral two times a day  pantoprazole    Tablet 40 milliGRAM(s) Oral before breakfast    PRN Inpatient Medications      REVIEW OF SYSTEMS  --------------------------------------------------------------------------------  Review Of Systems:  Constitutional: [ ] Fever [ ] Chills [ ] Fatigue [ ] Weight change   HEENT: [ ] Blurred vision [ ] Eye Pain [ ] Headache [ ] Runny nose [ ] Sore Throat   Respiratory: [ ] Cough [ ] Wheezing [ ] Shortness of breath  Cardiovascular: [ ] Chest Pain [ ] Palpitations [ ] LOBATO [ ] PND [ ] Orthopnea  Gastrointestinal: [ ] Abdominal Pain [ ] Diarrhea [ ] Constipation [ ] Hemorrhoids [ ] Nausea [ ] Vomiting  Genitourinary: [ ] Nocturia [ ] Dysuria [ ] Incontinence  Extremities: [ ] Swelling [ ] Joint Pain  Neurologic: [ ] Focal deficit [ ] Paresthesias [ ] Syncope  Lymphatic: [ ] Swelling [ ] Lymphadenopathy   Skin: [ ] Rash [ ] Ecchymoses [ ] Wounds [ ] Lesions  Psychiatry: [ ] Depression [ ] Suicidal/Homicidal Ideation [ ] Anxiety [ ] Sleep Disturbances  [x ] 10 point review of systems is otherwise negative except as mentioned above     [ ]Unable to obtain    All other systems were reviewed and are negative, except as noted.    VITALS/PHYSICAL EXAM  --------------------------------------------------------------------------------  T(C): 36.8 (07-07-17 @ 11:15), Max: 36.8 (07-06-17 @ 23:00)  HR: 85 (07-07-17 @ 11:15) (83 - 98)  BP: --  RR: 16 (07-07-17 @ 11:00) (15 - 18)  SpO2: 95% (07-07-17 @ 11:00) (95% - 100%)  Wt(kg): --      07-06-17 @ 07:01  -  07-07-17 @ 07:00  --------------------------------------------------------  IN: 1320 mL / OUT: 1675 mL / NET: -355 mL    07-07-17 @ 07:01  -  07-07-17 @ 12:25  --------------------------------------------------------  IN: 240 mL / OUT: 250 mL / NET: -10 mL        Physical Exam:  	Gen: NAD, well-appearing  	HEENT: PERRL  	Pulm: CTA B/L  	CV: RRR, S1S2; no rub  	Back: no sacral edema  	Abd: soft, nontender/nondistended  	: No diaz  	LE: Warm, no edema  	Neuro: no asterixis  	Skin: Warm, without rashes  	Vascular access:    LABS/STUDIES  --------------------------------------------------------------------------------              8.3    13.7  >-----------<  349      [07-07-17 @ 01:15]              24.7     135  |  102  |  23  ----------------------------<  97      [07-07-17 @ 01:15]  4.5   |  22  |  1.27        Ca     8.4     [07-07-17 @ 01:15]    TPro  6.8  /  Alb  3.1  /  TBili  1.6  /  DBili  x   /  AST  41  /  ALT  31  /  AlkPhos  140  [07-06-17 @ 14:55]    PT/INR: PT 18.1 , INR 1.66       [07-07-17 @ 01:15]  PTT: 32.8       [07-07-17 @ 01:15]          [07-07-17 @ 01:15]    Creatinine Trend:  SCr 1.27 [07-07 @ 01:15]  SCr 1.07 [07-06 @ 20:34]  SCr 1.15 [07-06 @ 14:55]  SCr 1.13 [07-06 @ 00:54]  SCr 1.15 [07-05 @ 03:53]    Urinalysis - [06-21-17 @ 18:17]      Color Yellow / Appearance  / SG 1.010 / pH 7.0      Gluc Negative / Ketone Negative  / Bili Negative / Urobili 4       Blood Small / Protein 30 / Leuk Est Moderate / Nitrite Negative      RBC 3-5 / WBC 6-10 / Hyaline  / Gran  / Sq Epi  / Non Sq Epi OCC / Bacteria Few    Urine Creatinine 69      [06-30-17 @ 19:50]  Urine Protein 37      [06-30-17 @ 19:50]    Iron 20, TIBC 352, %sat 6      [06-08-17 @ 07:14]  Ferritin 121.0      [06-09-17 @ 19:08]  HbA1c 5.5      [06-07-17 @ 06:14]  TSH 2.33      [06-14-17 @ 14:51]  Lipid: chol 62, TG 33, HDL 17, LDL 38      [06-07-17 @ 06:14]      Free Light Chains: kappa 16.30, lambda 11.10, ratio = 1.47      [06-27 @ 15:50]      RADIOLOGY  ---------------------------------------------------------------------------------

## 2017-07-07 NOTE — PROGRESS NOTE ADULT - PROBLEM SELECTOR PLAN 1
hemodynamically mediated in setting of decompensated heart failure +/- urinary retention component and hypotension.  Pt cr impoved.  Pt with  good urine output.  Monitor BMP  Strict I/O, avoid nephrotoxics, NSAIDs. Daily weights  so far weight has been stable.

## 2017-07-07 NOTE — PROVIDER CONTACT NOTE (OTHER) - NAME OF MD/NP/PA/DO NOTIFIED:
Carlyn Post MD
Dr. rhodes(team one)
Hardeep GARCIA
MD Rich
MD Valerio
OPAL Frye
OPAL Frye
OPAL Senior
Rivas Christensen
Silvano August
Silvano BRAVO
Rasheeda Bermudez

## 2017-07-07 NOTE — PROGRESS NOTE ADULT - PROBLEM SELECTOR PLAN 2
No programming changes  Continue to hold aspirin, coum and hep gtt while GI study on going for bleed source  discharge planning- eventually rehab

## 2017-07-07 NOTE — DOWNTIME INTERRUPTION NOTE - WHICH MANUAL FORMS INITIATED?
intake and output, e mar up dated.
Progress note  Adult plan of care Attempted to see patient  Documented for Jenni Cameron

## 2017-07-07 NOTE — PROGRESS NOTE ADULT - ASSESSMENT
Overall appears to be coping well with adjustment to LVAD, doing well with LVAD education and management.   Close friend is primary support person ([place]).  Slept well last night.  Appetite good. Reports he is managing stressors well and his mood has been upbeat. Receptive to support and validation.      Recommendations:   Review all treatment recommendations and LVAD information with teach back   Behavioral Cardiology will continue to follow to facilitate adjustment to LVAD

## 2017-07-07 NOTE — PROGRESS NOTE ADULT - SUBJECTIVE AND OBJECTIVE BOX
Chief Complaint:  GI bleed/anemia       Interval Events: s/p push enteroscopy yesterday with APC and clipping of 2 angioectasias in proximal jejunum. Afebrile. No abdominal pain/nausea/vomting/melena/hematochezia     Allergies:  No Known Allergies    Hospital Medications:  docusate sodium 100 milliGRAM(s) Oral three times a day  amiodarone    Tablet 200 milliGRAM(s) Oral daily  colchicine 0.6 milliGRAM(s) Oral daily  QUEtiapine 50 milliGRAM(s) Oral at bedtime  carvedilol 3.125 milliGRAM(s) Oral every 12 hours  furosemide    Tablet 20 milliGRAM(s) Oral two times a day  pantoprazole    Tablet 40 milliGRAM(s) Oral before breakfast      PMHX/PSHX:  H/O prior ablation treatment  Ventricular fibrillation  PAF (paroxysmal atrial fibrillation)  Non-Ischemic Cardiomyopathy  SVT (Supraventricular Tachycardia)  HTN  CHF (Congestive Heart Failure)  Status post left hip replacement  Hypertension  Hypertension  History of Prior Ablation Treatment  AICD (Automatic Cardioverter/Defibrillator) Present      Family history:  No pertinent family history in first degree relatives      ROS: as per HPI     PHYSICAL EXAM:   Vital Signs:  Vital Signs Last 24 Hrs  T(C): 36.8 (2017 03:00), Max: 36.8 (2017 23:00)  T(F): 98.2 (2017 03:00), Max: 98.2 (2017 23:00)  HR: 83 (2017 07:00) (83 - 98)  BP: --  BP(mean): 70 (2017 07:00) (70 - 85)  RR: 18 (2017 03:00) (15 - 18)  SpO2: 100% (2017 03:00) (96% - 100%)  Daily     Daily Weight in k (2017 06:00)    GENERAL:  NAD  CHEST:  breath sounds clear  HEART:  Regular rhythm  ABDOMEN:  Soft, non-tender, non-distended, normoactive bowel sounds  NEURO:  Alert, oriented    LABS:                        8.3    13.7  )-----------( 349      ( 2017 01:15 )             24.7     07-07    135  |  102  |  23  ----------------------------<  97  4.5   |  22  |  1.27    Ca    8.4      2017 01:15    TPro  6.8  /  Alb  3.1<L>  /  TBili  1.6<H>  /  DBili  x   /  AST  41<H>  /  ALT  31  /  AlkPhos  140<H>      LIVER FUNCTIONS - ( 2017 14:55 )  Alb: 3.1 g/dL / Pro: 6.8 g/dL / ALK PHOS: 140 U/L / ALT: 31 U/L RC / AST: 41 U/L / GGT: x           PT/INR - ( 2017 01:15 )   PT: 18.1 sec;   INR: 1.66 ratio         PTT - ( 2017 01:15 )  PTT:32.8 sec        Imaging:        G.I Procedure Report    Small bowel enteroscopy     Indication: GI bleed, Small bowel capsule showing blood in proximal small bowel     Findings:  Two angiodysplastic lesions with no bleeding were found in the proximal jejunum.  Coagulation for hemostasis using argon plasma at 2 liters/minute and 20 raymond was successful.  For hemostasis, two hemostatic clips were successfully placed (MR conditional).  There was no bleeding at the end of the procedure.     Impression:  Two non-bleeding angiodysplastic lesions in the jejunum.  Treated with argon plasma coagulation (APC).  Clips (MR conditional) were placed.    Recommendations:   - Return patient to [facility] for [facility] care.   - Resume previous diet.   - Monitor H/H, transfuse accordingly.  - Would hold anticoagulation for 24 hours.

## 2017-07-07 NOTE — PROGRESS NOTE ADULT - ATTENDING COMMENTS
stable. Hb 8.3 (last PRBC July 5).  GI recommending initiation of AC.  Start Coumadin no heparin no Aspirin  Marvin Campbell stable. Hb 8.3 (last PRBC July 5). LDh 261  GI recommending initiation of AC.  Start Coumadin no heparin no Aspirin  Marvin Campbell

## 2017-07-07 NOTE — PROGRESS NOTE ADULT - SUBJECTIVE AND OBJECTIVE BOX
Behavioral Cardiology Progress Note     HPI: Pt with advanced heart failure, s/p LVAD implant on 6/12/17    Behavioral Health Assessment:     Mood: "I'm doing great!"     Sleep: Slept well last night.          Current stressors:   Adjustment to LVAD     Support system/family support: Good support (close friend, siblings, granddaughter)      Coping strategies: Talking with staff, good sense of humor, talking with family/friends, Christianity pastora.        Understanding of medical illness and treatment plan: Doing well with LVAD education; has been using picture cards to identify LVAD equipment with success.   Feeling more confident in his ability to manage LVAD equipment.  Reports he was initally worried about his "lack of education" as a factor in learning about LVAD management but feels with support of LVAD coordinator and other members of the LVAD team, he is making steady progress and feeling more confident in his abilities.     MSE:  Pt seen sitting in chair.  A&O3.  Well related with good eye contact.  Thought process goal directed.  No abnormal thought content.  Mood: upbeat.  Affect: full range.  Insight and judgment adequate.      DX: systolic heart failure; s/p LVAD; r/o Adjustment disorder with anxiety and depressed mood     Assessment and Plan:   · Assessment		  Overall appears to be coping well with adjustment to LVAD, doing well with LVAD education and management.   Close friend is primary support person ([place]).  Slept well last night.  Appetite good. Reports he is managing stressors well and his mood has been upbeat. Receptive to support and validation.      Recommendations:   Review all treatment recommendations and LVAD information with teach back   Behavioral Cardiology will continue to follow to facilitate adjustment to LVAD

## 2017-07-07 NOTE — ANESTHESIA FOLLOW-UP NOTE - NSEVALATION_GEN_ALL_CORE
No apparent complications or complaints reguarding anesthesia care at this time
No apparent complications or complaints reguarding anesthesia care at this time/All questions were answered

## 2017-07-07 NOTE — PROGRESS NOTE ADULT - PROBLEM SELECTOR PLAN 4
Will hold aspirin and coumadin   PRBC today  no active s/s of bleeding noted   NPO today for Enteroscopy push study with GI  7/6  continue protonix gttp as per GI  Anticoagulation held 24 hr post cautery, will discuss resuming today

## 2017-07-08 LAB
ANION GAP SERPL CALC-SCNC: 12 MMOL/L — SIGNIFICANT CHANGE UP (ref 5–17)
BUN SERPL-MCNC: 17 MG/DL — SIGNIFICANT CHANGE UP (ref 7–23)
CALCIUM SERPL-MCNC: 8.7 MG/DL — SIGNIFICANT CHANGE UP (ref 8.4–10.5)
CHLORIDE SERPL-SCNC: 104 MMOL/L — SIGNIFICANT CHANGE UP (ref 96–108)
CO2 SERPL-SCNC: 21 MMOL/L — LOW (ref 22–31)
CREAT SERPL-MCNC: 1.07 MG/DL — SIGNIFICANT CHANGE UP (ref 0.5–1.3)
GLUCOSE SERPL-MCNC: 96 MG/DL — SIGNIFICANT CHANGE UP (ref 70–99)
HCT VFR BLD CALC: 29 % — LOW (ref 39–50)
HGB BLD-MCNC: 9.3 G/DL — LOW (ref 13–17)
INR BLD: 1.46 RATIO — HIGH (ref 0.88–1.16)
LDH SERPL L TO P-CCNC: 261 U/L — HIGH (ref 50–242)
MCHC RBC-ENTMCNC: 30.3 PG — SIGNIFICANT CHANGE UP (ref 27–34)
MCHC RBC-ENTMCNC: 32.2 GM/DL — SIGNIFICANT CHANGE UP (ref 32–36)
MCV RBC AUTO: 94.1 FL — SIGNIFICANT CHANGE UP (ref 80–100)
PLATELET # BLD AUTO: 336 K/UL — SIGNIFICANT CHANGE UP (ref 150–400)
POTASSIUM SERPL-MCNC: 4.3 MMOL/L — SIGNIFICANT CHANGE UP (ref 3.5–5.3)
POTASSIUM SERPL-SCNC: 4.3 MMOL/L — SIGNIFICANT CHANGE UP (ref 3.5–5.3)
PROTHROM AB SERPL-ACNC: 15.9 SEC — HIGH (ref 9.8–12.7)
RBC # BLD: 3.08 M/UL — LOW (ref 4.2–5.8)
RBC # FLD: 17.1 % — HIGH (ref 10.3–14.5)
SODIUM SERPL-SCNC: 137 MMOL/L — SIGNIFICANT CHANGE UP (ref 135–145)
WBC # BLD: 12.2 K/UL — HIGH (ref 3.8–10.5)
WBC # FLD AUTO: 12.2 K/UL — HIGH (ref 3.8–10.5)

## 2017-07-08 PROCEDURE — 99233 SBSQ HOSP IP/OBS HIGH 50: CPT

## 2017-07-08 PROCEDURE — 71010: CPT | Mod: 26

## 2017-07-08 PROCEDURE — 93750 INTERROGATION VAD IN PERSON: CPT

## 2017-07-08 RX ORDER — WARFARIN SODIUM 2.5 MG/1
5 TABLET ORAL ONCE
Qty: 0 | Refills: 0 | Status: COMPLETED | OUTPATIENT
Start: 2017-07-08 | End: 2017-07-08

## 2017-07-08 RX ORDER — ASPIRIN/CALCIUM CARB/MAGNESIUM 324 MG
81 TABLET ORAL DAILY
Qty: 0 | Refills: 0 | Status: DISCONTINUED | OUTPATIENT
Start: 2017-07-08 | End: 2017-07-09

## 2017-07-08 RX ADMIN — CARVEDILOL PHOSPHATE 3.12 MILLIGRAM(S): 80 CAPSULE, EXTENDED RELEASE ORAL at 16:38

## 2017-07-08 RX ADMIN — WARFARIN SODIUM 5 MILLIGRAM(S): 2.5 TABLET ORAL at 21:16

## 2017-07-08 RX ADMIN — PANTOPRAZOLE SODIUM 40 MILLIGRAM(S): 20 TABLET, DELAYED RELEASE ORAL at 06:10

## 2017-07-08 RX ADMIN — AMIODARONE HYDROCHLORIDE 200 MILLIGRAM(S): 400 TABLET ORAL at 06:10

## 2017-07-08 RX ADMIN — Medication 100 MILLIGRAM(S): at 06:10

## 2017-07-08 RX ADMIN — Medication 20 MILLIGRAM(S): at 06:10

## 2017-07-08 RX ADMIN — Medication 0.6 MILLIGRAM(S): at 11:29

## 2017-07-08 RX ADMIN — Medication 20 MILLIGRAM(S): at 16:38

## 2017-07-08 RX ADMIN — CARVEDILOL PHOSPHATE 3.12 MILLIGRAM(S): 80 CAPSULE, EXTENDED RELEASE ORAL at 06:10

## 2017-07-08 RX ADMIN — QUETIAPINE FUMARATE 50 MILLIGRAM(S): 200 TABLET, FILM COATED ORAL at 21:16

## 2017-07-08 RX ADMIN — Medication 100 MILLIGRAM(S): at 21:16

## 2017-07-08 RX ADMIN — Medication 100 MILLIGRAM(S): at 13:26

## 2017-07-08 NOTE — PROGRESS NOTE ADULT - SUBJECTIVE AND OBJECTIVE BOX
VITAL SIGNS    Telemetry: SR 80-90    Vital Signs Last 24 Hrs  T(C): 36.9 (07-08-17 @ 15:21), Max: 37 (07-08-17 @ 08:00)  T(F): 98.4 (07-08-17 @ 15:21), Max: 98.6 (07-08-17 @ 08:00)  HR: 97 (07-08-17 @ 12:00) (90 - 99)  BP: --  RR: 18 (07-08-17 @ 12:00) (18 - 18)  SpO2: 98% (07-08-17 @ 12:00) (98% - 99%)            07-07 @ 07:01  -  07-08 @ 07:00  --------------------------------------------------------  IN: 1180 mL / OUT: 1600 mL / NET: -420 mL    07-08 @ 07:01  -  07-08 @ 16:15  --------------------------------------------------------  IN: 320 mL / OUT: 200 mL / NET: 120 mL    PHYSICAL EXAM    Subjective: "feeling okay" No complaints offered. Denied dark/ bloody BM  Neurology: alert and oriented x 3, nonfocal, no gross deficits  CV : LVAD sounds  Lungs: clear. RR easy, unlabored   Abdomen: soft, nontender, nondistended, bowel movement   Neg N/V/D   :  pt voiding without difficulty   Extremities:   RUVALCABA; edema, neg calf tenderness.   PPP bilaterally

## 2017-07-08 NOTE — PROGRESS NOTE ADULT - ASSESSMENT
67 year old man with congestive heart failure with severely reduced LV systolic function due to a nonischemic dilated cardiomyopathy s/p HM2 LVAD on 6/12 with post-operative course complicated by  ventricular tachycardia and acute renal failure, which is improved and stable.  Acute blood loss anemia postop requiring multiple PRBC transfusions and 6/27 +guiac- GI following DR. James  pt placed on Protonix gttp  7/3 anticoagulation on Hold as Per CHF team    7/3 EGD: non-bleeding small superficial gastric ulcer,    7/4 capsule placed and retrieved / continue protonix gttp;  7/5   PRBC x 1, NPO   Push enteroscopy today for further evaluation at small intestine  7/6 Small bowel capsule showing blood in proximal small bowel.  Two non-bleeding angiodysplastic lesions in the jejunum.  Treated with argon plasma coagulation (APC).  Clips  were placed

## 2017-07-08 NOTE — PROGRESS NOTE ADULT - ATTENDING COMMENTS
NO events. Coumadin started.  Hb 9.3 stable INR 1.5  Continue coumadin. hold ASA.  Daily Hb and LDH  Marvin Campbell

## 2017-07-09 LAB
ALBUMIN SERPL ELPH-MCNC: 2.6 G/DL — LOW (ref 3.3–5)
ALP SERPL-CCNC: 142 U/L — HIGH (ref 40–120)
ALT FLD-CCNC: 36 U/L RC — SIGNIFICANT CHANGE UP (ref 10–45)
ANION GAP SERPL CALC-SCNC: 11 MMOL/L — SIGNIFICANT CHANGE UP (ref 5–17)
AST SERPL-CCNC: 48 U/L — HIGH (ref 10–40)
BILIRUB SERPL-MCNC: 0.9 MG/DL — SIGNIFICANT CHANGE UP (ref 0.2–1.2)
BUN SERPL-MCNC: 17 MG/DL — SIGNIFICANT CHANGE UP (ref 7–23)
CALCIUM SERPL-MCNC: 8.6 MG/DL — SIGNIFICANT CHANGE UP (ref 8.4–10.5)
CHLORIDE SERPL-SCNC: 105 MMOL/L — SIGNIFICANT CHANGE UP (ref 96–108)
CO2 SERPL-SCNC: 22 MMOL/L — SIGNIFICANT CHANGE UP (ref 22–31)
CREAT SERPL-MCNC: 0.98 MG/DL — SIGNIFICANT CHANGE UP (ref 0.5–1.3)
GLUCOSE SERPL-MCNC: 105 MG/DL — HIGH (ref 70–99)
HCT VFR BLD CALC: 25.7 % — LOW (ref 39–50)
HGB BLD-MCNC: 8.7 G/DL — LOW (ref 13–17)
INR BLD: 1.49 RATIO — HIGH (ref 0.88–1.16)
LDH SERPL L TO P-CCNC: 240 U/L — SIGNIFICANT CHANGE UP (ref 50–242)
MCHC RBC-ENTMCNC: 31.7 PG — SIGNIFICANT CHANGE UP (ref 27–34)
MCHC RBC-ENTMCNC: 33.7 GM/DL — SIGNIFICANT CHANGE UP (ref 32–36)
MCV RBC AUTO: 93.9 FL — SIGNIFICANT CHANGE UP (ref 80–100)
PLATELET # BLD AUTO: 316 K/UL — SIGNIFICANT CHANGE UP (ref 150–400)
POTASSIUM SERPL-MCNC: 4.1 MMOL/L — SIGNIFICANT CHANGE UP (ref 3.5–5.3)
POTASSIUM SERPL-SCNC: 4.1 MMOL/L — SIGNIFICANT CHANGE UP (ref 3.5–5.3)
PROT SERPL-MCNC: 5.8 G/DL — LOW (ref 6–8.3)
PROTHROM AB SERPL-ACNC: 16.2 SEC — HIGH (ref 9.8–12.7)
RBC # BLD: 2.74 M/UL — LOW (ref 4.2–5.8)
RBC # FLD: 16.9 % — HIGH (ref 10.3–14.5)
SODIUM SERPL-SCNC: 138 MMOL/L — SIGNIFICANT CHANGE UP (ref 135–145)
WBC # BLD: 10.9 K/UL — HIGH (ref 3.8–10.5)
WBC # FLD AUTO: 10.9 K/UL — HIGH (ref 3.8–10.5)

## 2017-07-09 PROCEDURE — 99233 SBSQ HOSP IP/OBS HIGH 50: CPT

## 2017-07-09 PROCEDURE — 93750 INTERROGATION VAD IN PERSON: CPT

## 2017-07-09 RX ORDER — WARFARIN SODIUM 2.5 MG/1
5 TABLET ORAL ONCE
Qty: 0 | Refills: 0 | Status: COMPLETED | OUTPATIENT
Start: 2017-07-09 | End: 2017-07-09

## 2017-07-09 RX ADMIN — Medication 0.6 MILLIGRAM(S): at 12:26

## 2017-07-09 RX ADMIN — QUETIAPINE FUMARATE 50 MILLIGRAM(S): 200 TABLET, FILM COATED ORAL at 21:19

## 2017-07-09 RX ADMIN — CARVEDILOL PHOSPHATE 3.12 MILLIGRAM(S): 80 CAPSULE, EXTENDED RELEASE ORAL at 05:19

## 2017-07-09 RX ADMIN — AMIODARONE HYDROCHLORIDE 200 MILLIGRAM(S): 400 TABLET ORAL at 05:19

## 2017-07-09 RX ADMIN — Medication 100 MILLIGRAM(S): at 05:19

## 2017-07-09 RX ADMIN — Medication 20 MILLIGRAM(S): at 16:54

## 2017-07-09 RX ADMIN — Medication 20 MILLIGRAM(S): at 05:19

## 2017-07-09 RX ADMIN — CARVEDILOL PHOSPHATE 3.12 MILLIGRAM(S): 80 CAPSULE, EXTENDED RELEASE ORAL at 16:54

## 2017-07-09 RX ADMIN — WARFARIN SODIUM 5 MILLIGRAM(S): 2.5 TABLET ORAL at 21:19

## 2017-07-09 RX ADMIN — Medication 100 MILLIGRAM(S): at 21:19

## 2017-07-09 RX ADMIN — PANTOPRAZOLE SODIUM 40 MILLIGRAM(S): 20 TABLET, DELAYED RELEASE ORAL at 05:19

## 2017-07-09 RX ADMIN — Medication 100 MILLIGRAM(S): at 12:26

## 2017-07-09 NOTE — PROGRESS NOTE ADULT - SUBJECTIVE AND OBJECTIVE BOX
VITAL SIGNS    Telemetry: SR     Vital Signs Last 24 Hrs  T(C): 36.7 (17 @ 08:00), Max: 37 (17 @ 18:58)  T(F): 98 (17 @ 08:00), Max: 98.6 (17 @ 18:58)  HR: 97 (17 @ 09:40) (87 - 97)  BP: --  RR: 18 (17 @ 08:00) (18 - 18)  SpO2: 96% (17 @ 04:00) (94% - 98%)             @ 07:01  -   @ 07:00  --------------------------------------------------------  IN: 860 mL / OUT: 1850 mL / NET: -990 mL    Daily Weight in k.5 (2017 05:57)  Weight (kg): 75 (2017 15:17)    PHYSICAL EXAM    Subjective: "feel okay"  Neurology: alert and oriented x 3, nonfocal, no gross deficits  CV LVAD sounds  Sternal Wound :  CDI with dressing , Stable  Lungs: clear. RR easy, unlabored   Abdomen: soft, nontender, nondistended, positive bowel sounds, bowel movement   Neg N/V/D   :  pt voiding without difficulty   Extremities:   RUVALCABA; edema, neg calf tenderness.   PPP bilaterally

## 2017-07-09 NOTE — PROGRESS NOTE ADULT - SUBJECTIVE AND OBJECTIVE BOX
24H hour events:     MEDICATIONS:  amiodarone    Tablet 200 milliGRAM(s) Oral daily  carvedilol 3.125 milliGRAM(s) Oral every 12 hours  furosemide    Tablet 20 milliGRAM(s) Oral two times a day  aspirin enteric coated 81 milliGRAM(s) Oral daily  QUEtiapine 50 milliGRAM(s) Oral at bedtime  docusate sodium 100 milliGRAM(s) Oral three times a day  pantoprazole    Tablet 40 milliGRAM(s) Oral before breakfast  colchicine 0.6 milliGRAM(s) Oral daily        REVIEW OF SYSTEMS:  Complete 10point ROS negative except as documented above.    PHYSICAL EXAM:  T(C): 36.7 (07-09-17 @ 08:00), Max: 37 (07-08-17 @ 18:58)  HR: 87 (07-09-17 @ 08:00) (87 - 97)  BP: --  RR: 18 (07-09-17 @ 08:00) (18 - 18)  SpO2: 96% (07-09-17 @ 04:00) (94% - 98%)  Wt(kg): --  I&O's Summary    08 Jul 2017 07:01  -  09 Jul 2017 07:00  --------------------------------------------------------  IN: 860 mL / OUT: 1850 mL / NET: -990 mL        Appearance: Normal	  HEENT:   Normal oral mucosa, PERRL, EOMI	  Lymphatic: No lymphadenopathy  Cardiovascular: Normal S1 S2, No JVD, No murmurs, No edema  Respiratory: Lungs clear to auscultation	  Psychiatry: A & O x 3, Mood & affect appropriate  Gastrointestinal:  Soft, Non-tender, + BS	  Skin: No rashes, No ecchymoses, No cyanosis	  Neurologic: Non-focal  Extremities: Normal range of motion, No clubbing, cyanosis or edema  Vascular: Peripheral pulses palpable 2+ bilaterally        LABS:	 	    CBC Full  -  ( 09 Jul 2017 03:49 )  WBC Count : 10.9 K/uL  Hemoglobin : 8.7 g/dL  Hematocrit : 25.7 %  Platelet Count - Automated : 316 K/uL  Mean Cell Volume : 93.9 fl  Mean Cell Hemoglobin : 31.7 pg  Mean Cell Hemoglobin Concentration : 33.7 gm/dL  Auto Neutrophil # : x  Auto Lymphocyte # : x  Auto Monocyte # : x  Auto Eosinophil # : x  Auto Basophil # : x  Auto Neutrophil % : x  Auto Lymphocyte % : x  Auto Monocyte % : x  Auto Eosinophil % : x  Auto Basophil % : x    07-09    138  |  105  |  17  ----------------------------<  105<H>  4.1   |  22  |  0.98  07-08    137  |  104  |  17  ----------------------------<  96  4.3   |  21<L>  |  1.07    Ca    8.6      09 Jul 2017 03:49  Ca    8.7      08 Jul 2017 03:44    TPro  5.8<L>  /  Alb  2.6<L>  /  TBili  0.9  /  DBili  x   /  AST  48<H>  /  ALT  36  /  AlkPhos  142<H>  07-09      proBNP:   Lipid Profile:   HgA1c:   TSH:       TELEMETRY: 	    	      	  ASSESSMENT/PLAN: 	  67  y.o M HTN, PAF s/p ablation , NICM  s/p ICD and LVAD p/w GIB s/p EGD and clipping 7/3  1. Nonischemic dilated cardiomyopathy s/p HM2 LVAD on 6/12: Continue coreg 3.125mg BID and lasix 20mg PO BID    2. GI bleed:  S/p EGD with APC/clipping x 2.   -c/w coumadin  -hold ASA  -daily Hb and LDH    3. History of  VT: c/w Amiodarone 200 mg daily, Coreg 3.125 mg bid and PO Lasix 20 mg daily.      Cedrick Zavala [id number], please call [phone] after 1pm 24H hour events: only complaint is generalized itching. otherwise feels well. no cp or sob.    MEDICATIONS:  amiodarone    Tablet 200 milliGRAM(s) Oral daily  carvedilol 3.125 milliGRAM(s) Oral every 12 hours  furosemide    Tablet 20 milliGRAM(s) Oral two times a day  aspirin enteric coated 81 milliGRAM(s) Oral daily  QUEtiapine 50 milliGRAM(s) Oral at bedtime  docusate sodium 100 milliGRAM(s) Oral three times a day  pantoprazole    Tablet 40 milliGRAM(s) Oral before breakfast  colchicine 0.6 milliGRAM(s) Oral daily      REVIEW OF SYSTEMS:  Complete 10point ROS negative except as documented above.    PHYSICAL EXAM:  T(C): 36.7 (07-09-17 @ 08:00), Max: 37 (07-08-17 @ 18:58)  HR: 87 (07-09-17 @ 08:00) (87 - 97)  BP: --  RR: 18 (07-09-17 @ 08:00) (18 - 18)  SpO2: 96% (07-09-17 @ 04:00) (94% - 98%)  Wt(kg): --  I&O's Summary    08 Jul 2017 07:01  -  09 Jul 2017 07:00  --------------------------------------------------------  IN: 860 mL / OUT: 1850 mL / NET: -990 mL        Appearance: Normal	  HEENT:   Normal oral mucosa, PERRL, EOMI	  Lymphatic: No lymphadenopathy  Cardiovascular: Typical LVAD sounds  Respiratory: Lungs clear to auscultation	  Psychiatry: A & O x 3, Mood & affect appropriate  Gastrointestinal:  Soft, Non-tender, + BS	  Skin: No rashes, No ecchymoses, No cyanosis	  Neurologic: Non-focal  Extremities: Normal range of motion, No clubbing, cyanosis or edema  Vascular: Peripheral pulses palpable 2+ bilaterally      LABS:	 	    CBC Full  -  ( 09 Jul 2017 03:49 )  WBC Count : 10.9 K/uL  Hemoglobin : 8.7 g/dL  Hematocrit : 25.7 %  Platelet Count - Automated : 316 K/uL      07-09    138  |  105  |  17  ----------------------------<  105<H>  4.1   |  22  |  0.98  07-08    137  |  104  |  17  ----------------------------<  96  4.3   |  21<L>  |  1.07    Ca    8.6      09 Jul 2017 03:49  Ca    8.7      08 Jul 2017 03:44    TPro  5.8<L>  /  Alb  2.6<L>  /  TBili  0.9  /  DBili  x   /  AST  48<H>  /  ALT  36  /  AlkPhos  142<H>  07-09          TELEMETRY: 	NSR ST, 15 beats WCT, PVC's    	    	  ASSESSMENT/PLAN: 	  67  y.o M HTN, PAF s/p ablation , NICM  s/p ICD and LVAD p/w GIB s/p EGD and clipping 7/3  1. Nonischemic dilated cardiomyopathy s/p HM2 LVAD on 6/12: Continue coreg 3.125mg BID and lasix 20mg PO BID    2. GI bleed:  S/p EGD with APC/clipping x 2.   -c/w coumadin  -would hold ASA  -daily Hb and LDH    3. History of  VT: c/w Amiodarone 200 mg daily, Coreg 3.125 mg bid and PO Lasix 20 mg daily.      Cedrick Zavala [phone], please call [phone] after 1pm

## 2017-07-10 DIAGNOSIS — I42.8 OTHER CARDIOMYOPATHIES: ICD-10-CM

## 2017-07-10 DIAGNOSIS — I47.2 VENTRICULAR TACHYCARDIA: ICD-10-CM

## 2017-07-10 DIAGNOSIS — K92.2 GASTROINTESTINAL HEMORRHAGE, UNSPECIFIED: ICD-10-CM

## 2017-07-10 LAB
ALBUMIN SERPL ELPH-MCNC: 2.7 G/DL — LOW (ref 3.3–5)
ALP SERPL-CCNC: 163 U/L — HIGH (ref 40–120)
ALT FLD-CCNC: 37 U/L RC — SIGNIFICANT CHANGE UP (ref 10–45)
ANION GAP SERPL CALC-SCNC: 13 MMOL/L — SIGNIFICANT CHANGE UP (ref 5–17)
AST SERPL-CCNC: 44 U/L — HIGH (ref 10–40)
BILIRUB SERPL-MCNC: 0.9 MG/DL — SIGNIFICANT CHANGE UP (ref 0.2–1.2)
BUN SERPL-MCNC: 19 MG/DL — SIGNIFICANT CHANGE UP (ref 7–23)
CALCIUM SERPL-MCNC: 8.5 MG/DL — SIGNIFICANT CHANGE UP (ref 8.4–10.5)
CHLORIDE SERPL-SCNC: 103 MMOL/L — SIGNIFICANT CHANGE UP (ref 96–108)
CO2 SERPL-SCNC: 22 MMOL/L — SIGNIFICANT CHANGE UP (ref 22–31)
CREAT SERPL-MCNC: 1.08 MG/DL — SIGNIFICANT CHANGE UP (ref 0.5–1.3)
GLUCOSE SERPL-MCNC: 104 MG/DL — HIGH (ref 70–99)
HCT VFR BLD CALC: 25.9 % — LOW (ref 39–50)
HGB BLD-MCNC: 8.7 G/DL — LOW (ref 13–17)
INR BLD: 1.63 RATIO — HIGH (ref 0.88–1.16)
LDH SERPL L TO P-CCNC: 242 U/L — SIGNIFICANT CHANGE UP (ref 50–242)
MCHC RBC-ENTMCNC: 31.6 PG — SIGNIFICANT CHANGE UP (ref 27–34)
MCHC RBC-ENTMCNC: 33.7 GM/DL — SIGNIFICANT CHANGE UP (ref 32–36)
MCV RBC AUTO: 93.8 FL — SIGNIFICANT CHANGE UP (ref 80–100)
PLATELET # BLD AUTO: 340 K/UL — SIGNIFICANT CHANGE UP (ref 150–400)
POTASSIUM SERPL-MCNC: 3.9 MMOL/L — SIGNIFICANT CHANGE UP (ref 3.5–5.3)
POTASSIUM SERPL-SCNC: 3.9 MMOL/L — SIGNIFICANT CHANGE UP (ref 3.5–5.3)
PROT SERPL-MCNC: 6.1 G/DL — SIGNIFICANT CHANGE UP (ref 6–8.3)
PROTHROM AB SERPL-ACNC: 17.8 SEC — HIGH (ref 9.8–12.7)
RBC # BLD: 2.76 M/UL — LOW (ref 4.2–5.8)
RBC # FLD: 16.7 % — HIGH (ref 10.3–14.5)
SODIUM SERPL-SCNC: 138 MMOL/L — SIGNIFICANT CHANGE UP (ref 135–145)
WBC # BLD: 11.6 K/UL — HIGH (ref 3.8–10.5)
WBC # FLD AUTO: 11.6 K/UL — HIGH (ref 3.8–10.5)

## 2017-07-10 PROCEDURE — 93750 INTERROGATION VAD IN PERSON: CPT

## 2017-07-10 PROCEDURE — 99233 SBSQ HOSP IP/OBS HIGH 50: CPT | Mod: 25

## 2017-07-10 RX ORDER — ACETAMINOPHEN 500 MG
650 TABLET ORAL EVERY 6 HOURS
Qty: 0 | Refills: 0 | Status: DISCONTINUED | OUTPATIENT
Start: 2017-07-10 | End: 2017-07-17

## 2017-07-10 RX ORDER — POT CHLORIDE/POT BICARB/CIT AC 25 MEQ
25 TABLET, EFFERVESCENT ORAL ONCE
Qty: 0 | Refills: 0 | Status: COMPLETED | OUTPATIENT
Start: 2017-07-10 | End: 2017-07-10

## 2017-07-10 RX ORDER — SPIRONOLACTONE 25 MG/1
12.5 TABLET, FILM COATED ORAL DAILY
Qty: 0 | Refills: 0 | Status: DISCONTINUED | OUTPATIENT
Start: 2017-07-10 | End: 2017-07-17

## 2017-07-10 RX ORDER — WARFARIN SODIUM 2.5 MG/1
3 TABLET ORAL ONCE
Qty: 0 | Refills: 0 | Status: COMPLETED | OUTPATIENT
Start: 2017-07-10 | End: 2017-07-10

## 2017-07-10 RX ORDER — SPIRONOLACTONE 25 MG/1
12.5 TABLET, FILM COATED ORAL DAILY
Qty: 0 | Refills: 0 | Status: DISCONTINUED | OUTPATIENT
Start: 2017-07-10 | End: 2017-07-10

## 2017-07-10 RX ADMIN — Medication 100 MILLIGRAM(S): at 05:56

## 2017-07-10 RX ADMIN — Medication 650 MILLIGRAM(S): at 23:17

## 2017-07-10 RX ADMIN — CARVEDILOL PHOSPHATE 3.12 MILLIGRAM(S): 80 CAPSULE, EXTENDED RELEASE ORAL at 05:56

## 2017-07-10 RX ADMIN — Medication 0.6 MILLIGRAM(S): at 13:41

## 2017-07-10 RX ADMIN — PANTOPRAZOLE SODIUM 40 MILLIGRAM(S): 20 TABLET, DELAYED RELEASE ORAL at 05:56

## 2017-07-10 RX ADMIN — WARFARIN SODIUM 3 MILLIGRAM(S): 2.5 TABLET ORAL at 21:17

## 2017-07-10 RX ADMIN — SPIRONOLACTONE 12.5 MILLIGRAM(S): 25 TABLET, FILM COATED ORAL at 21:16

## 2017-07-10 RX ADMIN — Medication 100 MILLIGRAM(S): at 21:17

## 2017-07-10 RX ADMIN — AMIODARONE HYDROCHLORIDE 200 MILLIGRAM(S): 400 TABLET ORAL at 05:56

## 2017-07-10 RX ADMIN — Medication 20 MILLIGRAM(S): at 05:56

## 2017-07-10 RX ADMIN — QUETIAPINE FUMARATE 50 MILLIGRAM(S): 200 TABLET, FILM COATED ORAL at 21:17

## 2017-07-10 RX ADMIN — Medication 650 MILLIGRAM(S): at 22:47

## 2017-07-10 RX ADMIN — CARVEDILOL PHOSPHATE 3.12 MILLIGRAM(S): 80 CAPSULE, EXTENDED RELEASE ORAL at 17:51

## 2017-07-10 RX ADMIN — Medication 25 MILLIEQUIVALENT(S): at 13:43

## 2017-07-10 RX ADMIN — Medication 100 MILLIGRAM(S): at 13:40

## 2017-07-10 RX ADMIN — Medication 20 MILLIGRAM(S): at 17:51

## 2017-07-10 NOTE — PROGRESS NOTE ADULT - ASSESSMENT
ASSESSMENT  1. Nonischemic dilated cardiomyopathy s/p HM2 LVAD on 6/12  2. Acute blood loss anemia sec to GI bleeding  3. Hx of VT    PLAN  1. Nonischemic dilated cardiomyopathy s/p HM2 LVAD on 6/12: Continue coreg 3.125mg BID and lasix 20mg PO BID  2. GI bleed:  S/p EGD with APC/clipping x 2. Hg Improved from 8.4 to 9.3  Ok to restart AC and ASA 81mg daily. Keep Hgb > 8.0    3. History of  VT: c/w Amiodarone 200 mg daily, Coreg 3.125 mg bid and PO Lasix 20 mg daily. ASSESSMENT  1. Nonischemic dilated cardiomyopathy s/p HM2 LVAD on 6/12  2. Acute blood loss anemia sec to GI bleeding S/P gastric clipping on July 6  3. Hx of VT    PLAN  1. Nonischemic dilated cardiomyopathy s/p HM2 LVAD on 6/12: Continue coreg 3.125mg BID and lasix 20mg PO BID  2. GI bleed:  S/p EGD with APC/clipping x 2. Hg Improved from 8.4 to 9.3  Ok to restart AC and ASA 81mg daily. Keep Hgb > 8.0    3. History of  VT: c/w Amiodarone 200 mg daily, Coreg 3.125 mg bid and PO Lasix 20 mg daily. ASSESSMENT  1. Nonischemic dilated cardiomyopathy s/p HM2 LVAD on 6/12  2. Acute blood loss anemia sec to GI bleeding S/P gastric APC clipping on July 6  3. Hx of VT

## 2017-07-10 NOTE — PROGRESS NOTE ADULT - PROBLEM SELECTOR PLAN 3
Increase carvedilol to 6.25 mgs daily Increase carvedilol to 6.25 mgs bid Increase carvedilol to 6.25 mg bid

## 2017-07-10 NOTE — PROGRESS NOTE ADULT - ATTENDING COMMENTS
Doing very well and feels even better. Ambulating with walker, independent from chair to bathroom.  On warfarin and remains off ASA. INR 1.63 and slowly rising on 5 mg po QHS. Would reduce dose tonight to 3 mg given GIB.  LDH & Hgb stable. BP slightly high so Coreg increased to also help with WCT overnight.  Please add spironolactone 12.5 mg daily given mild hypokalemia and WCT.  VAD teaching to continue and hopefully discharge home by end of the week.

## 2017-07-10 NOTE — PROGRESS NOTE ADULT - SUBJECTIVE AND OBJECTIVE BOX
im good    VITAL SIGNS    Telemetry:      Vital Signs Last 24 Hrs  T(C): 36.6 (07-10-17 @ 08:00), Max: 37.2 (07-10-17 @ 00:00)  T(F): 97.8 (07-10-17 @ 08:00), Max: 99 (07-10-17 @ 00:00)  HR: 101 (07-10-17 @ 12:22) (85 - 103)  BP: --  RR: 18 (07-10-17 @ 08:00) (18 - 20)  SpO2: 99% (07-10-17 @ 08:00) (95% - 99%)                    @ 07:01  -  07-10 @ 07:00  --------------------------------------------------------  IN: 1160 mL / OUT: 1950 mL / NET: -790 mL    07-10 @ 07:  -  07-10 @ 13:42  --------------------------------------------------------  IN: 360 mL / OUT: 525 mL / NET: -165 mL          Daily     Daily Weight in k.1 (10 Jul 2017 06:34)      Bilirubin Total, Serum: 0.9 mg/dL (07-10 @ 03:16)    CAPILLARY BLOOD GLUCOSE                      Coumadin    x[ ] YES          [  ]      NO         Reason:     lvad                          PHYSICAL EXAM        Neurology: alert and oriented x 3, nonfocal, no gross deficits    CV : hum from lvad    Sternal Wound :  CDI , Stable    Lungs: cta    Abdomen: soft, nontender, nondistended, positive bowel sounds,    :           voiding    Extremities:        - edema, negative calve tenderness,   leg incision cdi                                       Physical Therapy Rec:   Home  [  ]   Home w/ PT  [  ]  Rehab  [  ]    Discussed with Cardiothoracic Team at AM rounds.

## 2017-07-10 NOTE — PROGRESS NOTE ADULT - SUBJECTIVE AND OBJECTIVE BOX
Interval History:    Medications:  docusate sodium 100 milliGRAM(s) Oral three times a day  amiodarone    Tablet 200 milliGRAM(s) Oral daily  colchicine 0.6 milliGRAM(s) Oral daily  QUEtiapine 50 milliGRAM(s) Oral at bedtime  carvedilol 3.125 milliGRAM(s) Oral every 12 hours  furosemide    Tablet 20 milliGRAM(s) Oral two times a day  pantoprazole    Tablet 40 milliGRAM(s) Oral before breakfast  potassium bicarbonate/potassium chloride Effervescent Tablet 25 milliEquivalent(s) Oral once    Vitals:  T(C): 36.6 (07-10-17 @ 08:00), Max: 37.2 (07-10-17 @ 00:00)  HR: 85 (07-10-17 @ 08:00) (85 - 103)  BP(mean): 88 (07-10-17 @ 08:00) (78 - 90)      Daily Weight in k.1 (10 Jul 2017 06:34)    I&O's Summary    2017 07:  -  10 Jul 2017 07:00  --------------------------------------------------------  IN: 1160 mL / OUT: 1950 mL / NET: -790 mL    10 Jul 2017 07:01  -  10 Jul 2017 10:13  --------------------------------------------------------  IN: 0 mL / OUT: 275 mL / NET: -275 mL        Physical Exam:  Appearance: No Acute Distress  HEENT:   Normal oral mucosa, PERRL, EOMI	  Cardiovascular: Normal S1 S2, No JVD, No murmurs/rubs/gallops  Respiratory: Clear to auscultation bilaterally  Gastrointestinal:  Soft, Non-tender	  Skin: No cyanosis	  Neurologic: Non-focal  Extremities: No clubbing, cyanosis or edema  Vascular: 2+ DP/PT pulses bilateral  Psychiatry: A & O x 3, Mood & affect appropriate    LVAD Interrogation:  Pump Flow:  Pump Speed:  Pulse Index:  Pump Power:  VAD Events:   Driveline evaluation:    Programming Changes: [ ] No changes made [ ] Changes made:  Labs:                        8.7    11.6  )-----------( 340      ( 10 Jul 2017 03:16 )             25.9     07-10    138  |  103  |  19  ----------------------------<  104<H>  3.9   |  22  |  1.08    Ca    8.5      10 Jul 2017 03:16    TPro  6.1  /  Alb  2.7<L>  /  TBili  0.9  /  DBili  x   /  AST  44<H>  /  ALT  37  /  AlkPhos  163<H>  07-10    PT/INR - ( 10 Jul 2017 03:16 )   PT: 17.8 sec;   INR: 1.63 ratio      Lactate Dehydrogenase, Serum: 242 U/L (07-10 @ 03:16)  Lactate Dehydrogenase, Serum: 240 U/L ( @ 03:49)  Lactate Dehydrogenase, Serum: 261 U/L ( @ 03:44)      TELEMETRY: Interval History:    Medications:  Coumadin  docusate sodium 100 milliGRAM(s) Oral three times a day  amiodarone    Tablet 200 milliGRAM(s) Oral daily  colchicine 0.6 milliGRAM(s) Oral daily  QUEtiapine 50 milliGRAM(s) Oral at bedtime  carvedilol 3.125 milliGRAM(s) Oral every 12 hours  furosemide    Tablet 20 milliGRAM(s) Oral two times a day  pantoprazole    Tablet 40 milliGRAM(s) Oral before breakfast  potassium bicarbonate/potassium chloride Effervescent Tablet 25 milliEquivalent(s) Oral once    Vitals:  T(C): 36.6 (07-10-17 @ 08:00), Max: 37.2 (07-10-17 @ 00:00)  HR: 85 (07-10-17 @ 08:00) (85 - 103)  BP(mean): 88 (07-10-17 @ 08:00) (78 - 90)      Daily Weight in k.1 (10 Jul 2017 06:34)    I&O's Summary    2017 07:  -  10 Jul 2017 07:00  --------------------------------------------------------  IN: 1160 mL / OUT: 1950 mL / NET: -790 mL    10 Jul 2017 07:01  -  10 Jul 2017 10:13  --------------------------------------------------------  IN: 0 mL / OUT: 275 mL / NET: -275 mL        Physical Exam:  Appearance: No Acute Distress  HEENT:   Normal oral mucosa, PERRL, EOMI	  Cardiovascular: Normal S1 S2, No JVD, No murmurs/rubs/gallops  Respiratory: Clear to auscultation bilaterally  Gastrointestinal:  Soft, Non-tender	  Skin: No cyanosis	  Neurologic: Non-focal  Extremities: No clubbing, cyanosis or edema  Vascular: 2+ DP/PT pulses bilateral  Psychiatry: A & O x 3, Mood & affect appropriate    LVAD Interrogation:  Pump Flow:  Pump Speed:  Pulse Index:  Pump Power:  VAD Events:   Driveline evaluation:    Programming Changes: [ ] No changes made [ ] Changes made:  Labs:                        8.7    11.6  )-----------( 340      ( 10 Jul 2017 03:16 )             25.9     07-10    138  |  103  |  19  ----------------------------<  104<H>  3.9   |  22  |  1.08    Ca    8.5      10 Jul 2017 03:16    TPro  6.1  /  Alb  2.7<L>  /  TBili  0.9  /  DBili  x   /  AST  44<H>  /  ALT  37  /  AlkPhos  163<H>  07-10    PT/INR - ( 10 Jul 2017 03:16 )   PT: 17.8 sec;   INR: 1.63 ratio      Lactate Dehydrogenase, Serum: 242 U/L (07-10 @ 03:16)  Lactate Dehydrogenase, Serum: 240 U/L ( @ 03:49)  Lactate Dehydrogenase, Serum: 261 U/L ( @ 03:44)      TELEMETRY: Interval History: Patient resting comfortably in chair. Walked with walker around unit    Medications:  Coumadin  docusate sodium 100 milliGRAM(s) Oral three times a day  amiodarone    Tablet 200 milliGRAM(s) Oral daily  colchicine 0.6 milliGRAM(s) Oral daily  QUEtiapine 50 milliGRAM(s) Oral at bedtime  carvedilol 3.125 milliGRAM(s) Oral every 12 hours  furosemide    Tablet 20 milliGRAM(s) Oral two times a day  pantoprazole    Tablet 40 milliGRAM(s) Oral before breakfast  potassium bicarbonate/potassium chloride Effervescent Tablet 25 milliEquivalent(s) Oral once    Vitals:  T(C): 36.6 (07-10-17 @ 08:00), Max: 37.2 (07-10-17 @ 00:00)  HR: 85 (07-10-17 @ 08:00) (85 - 103)  BP(mean): 88 (07-10-17 @ 08:00) (78 - 90)      Daily Weight in k.1 (10 Jul 2017 06:34)    I&O's Summary    2017 07:  -  10 Jul 2017 07:00  --------------------------------------------------------  IN: 1160 mL / OUT: 1950 mL / NET: -790 mL    10 Jul 2017 07:01  -  10 Jul 2017 10:13  --------------------------------------------------------  IN: 0 mL / OUT: 275 mL / NET: -275 mL        Physical Exam:  Appearance: No Acute Distress  HEENT:   Normal oral mucosa	  Cardiovascular: Normal S1 S2, JVD < 6 cm H2O  Respiratory: Clear to auscultation bilaterally  Gastrointestinal:  Soft, Non-tender	  Skin: No cyanosis	  Neurologic: Non-focal  Extremities: No clubbing, cyanosis or edema  Vascular: 2+  pulses bilateral  Psychiatry: A & O x 3, Mood & affect appropriate    LVAD Interrogation:  Pump Flow:5.5  Pump Speed: 9200  Pulse Index:5.9  Pump Power:5.7  VAD Events: # PI events over past three days  Driveline evaluation: Clean and intact   Programming Changes: [X ] No changes made [ ] Changes made:  Labs:                        8.7    11.6  )-----------( 340      ( 10 Jul 2017 03:16 )             25.9     07-10    138  |  103  |  19  ----------------------------<  104<H>  3.9   |  22  |  1.08    Ca    8.5      10 Jul 2017 03:16    TPro  6.1  /  Alb  2.7<L>  /  TBili  0.9  /  DBili  x   /  AST  44<H>  /  ALT  37  /  AlkPhos  163<H>  07-10    PT/INR - ( 10 Jul 2017 03:16 )   PT: 17.8 sec;   INR: 1.63 ratio      Lactate Dehydrogenase, Serum: 242 U/L (07-10 @ 03:16)  Lactate Dehydrogenase, Serum: 240 U/L ( @ 03:49)  Lactate Dehydrogenase, Serum: 261 U/L ( @ 03:44)      TELEMETRY: Sinus 90's -110 with 13 beat WCT overnight Interval History: Patient resting comfortably in chair. Walked with walker around unit    Medications:  Coumadin  docusate sodium 100 milliGRAM(s) Oral three times a day  amiodarone    Tablet 200 milliGRAM(s) Oral daily  colchicine 0.6 milliGRAM(s) Oral daily  QUEtiapine 50 milliGRAM(s) Oral at bedtime  carvedilol 3.125 milliGRAM(s) Oral every 12 hours  furosemide    Tablet 20 milliGRAM(s) Oral two times a day  pantoprazole    Tablet 40 milliGRAM(s) Oral before breakfast  potassium bicarbonate/potassium chloride Effervescent Tablet 25 milliEquivalent(s) Oral once    Vitals:  T(C): 36.6 (07-10-17 @ 08:00), Max: 37.2 (07-10-17 @ 00:00)  HR: 85 (07-10-17 @ 08:00) (85 - 103)  BP(mean): 88 (07-10-17 @ 08:00) (78 - 90)      Daily Weight in k.1 (10 Jul 2017 06:34)    I&O's Summary    2017 07:  -  10 Jul 2017 07:00  --------------------------------------------------------  IN: 1160 mL / OUT: 1950 mL / NET: -790 mL    10 Jul 2017 07:01  -  10 Jul 2017 10:13  --------------------------------------------------------  IN: 0 mL / OUT: 275 mL / NET: -275 mL        Physical Exam:  Appearance: No Acute Distress  HEENT:   Normal oral mucosa	  Cardiovascular: Normal S1 S2, JVD < 6 cm H2O, LVAD hum audible  Respiratory: Clear to auscultation bilaterally  Gastrointestinal:  Soft, Non-tender	  Skin: No cyanosis	  Neurologic: Non-focal  Extremities: No clubbing, cyanosis or edema  Vascular: 2+  pulses bilateral  Psychiatry: A & O x 3, Mood & affect appropriate    LVAD Interrogation:  Pump Flow:5.5  Pump Speed: 9200  Pulse Index:5.9  Pump Power:5.7  VAD Events: # PI events over past three days  Driveline evaluation: Clean and intact   Programming Changes: [X ] No changes made [ ] Changes made:  Labs:                        8.7    11.6  )-----------( 340      ( 10 Jul 2017 03:16 )             25.9     07-10    138  |  103  |  19  ----------------------------<  104<H>  3.9   |  22  |  1.08    Ca    8.5      10 Jul 2017 03:16    TPro  6.1  /  Alb  2.7<L>  /  TBili  0.9  /  DBili  x   /  AST  44<H>  /  ALT  37  /  AlkPhos  163<H>  07-10    PT/INR - ( 10 Jul 2017 03:16 )   PT: 17.8 sec;   INR: 1.63 ratio      Lactate Dehydrogenase, Serum: 242 U/L (07-10 @ 03:16)  Lactate Dehydrogenase, Serum: 240 U/L ( @ 03:49)  Lactate Dehydrogenase, Serum: 261 U/L ( @ 03:44)      TELEMETRY: Sinus 90's -110 with 13 beat WCT overnight

## 2017-07-11 LAB
ALBUMIN SERPL ELPH-MCNC: 2.7 G/DL — LOW (ref 3.3–5)
ALP SERPL-CCNC: 170 U/L — HIGH (ref 40–120)
ALT FLD-CCNC: 38 U/L RC — SIGNIFICANT CHANGE UP (ref 10–45)
ANION GAP SERPL CALC-SCNC: 12 MMOL/L — SIGNIFICANT CHANGE UP (ref 5–17)
AST SERPL-CCNC: 50 U/L — HIGH (ref 10–40)
BILIRUB SERPL-MCNC: 0.9 MG/DL — SIGNIFICANT CHANGE UP (ref 0.2–1.2)
BUN SERPL-MCNC: 18 MG/DL — SIGNIFICANT CHANGE UP (ref 7–23)
CALCIUM SERPL-MCNC: 8.5 MG/DL — SIGNIFICANT CHANGE UP (ref 8.4–10.5)
CHLORIDE SERPL-SCNC: 104 MMOL/L — SIGNIFICANT CHANGE UP (ref 96–108)
CO2 SERPL-SCNC: 22 MMOL/L — SIGNIFICANT CHANGE UP (ref 22–31)
CREAT SERPL-MCNC: 1.14 MG/DL — SIGNIFICANT CHANGE UP (ref 0.5–1.3)
GLUCOSE SERPL-MCNC: 108 MG/DL — HIGH (ref 70–99)
HCT VFR BLD CALC: 24.7 % — LOW (ref 39–50)
HGB BLD-MCNC: 8.1 G/DL — LOW (ref 13–17)
INR BLD: 2.17 RATIO — HIGH (ref 0.88–1.16)
LDH SERPL L TO P-CCNC: 248 U/L — HIGH (ref 50–242)
MCHC RBC-ENTMCNC: 30.8 PG — SIGNIFICANT CHANGE UP (ref 27–34)
MCHC RBC-ENTMCNC: 32.8 GM/DL — SIGNIFICANT CHANGE UP (ref 32–36)
MCV RBC AUTO: 94.1 FL — SIGNIFICANT CHANGE UP (ref 80–100)
PLATELET # BLD AUTO: 364 K/UL — SIGNIFICANT CHANGE UP (ref 150–400)
POTASSIUM SERPL-MCNC: 4 MMOL/L — SIGNIFICANT CHANGE UP (ref 3.5–5.3)
POTASSIUM SERPL-SCNC: 4 MMOL/L — SIGNIFICANT CHANGE UP (ref 3.5–5.3)
PROT SERPL-MCNC: 6 G/DL — SIGNIFICANT CHANGE UP (ref 6–8.3)
PROTHROM AB SERPL-ACNC: 24 SEC — HIGH (ref 9.8–12.7)
RBC # BLD: 2.63 M/UL — LOW (ref 4.2–5.8)
RBC # FLD: 16.8 % — HIGH (ref 10.3–14.5)
SODIUM SERPL-SCNC: 138 MMOL/L — SIGNIFICANT CHANGE UP (ref 135–145)
WBC # BLD: 11.9 K/UL — HIGH (ref 3.8–10.5)
WBC # FLD AUTO: 11.9 K/UL — HIGH (ref 3.8–10.5)

## 2017-07-11 PROCEDURE — 99233 SBSQ HOSP IP/OBS HIGH 50: CPT | Mod: 25

## 2017-07-11 PROCEDURE — 99232 SBSQ HOSP IP/OBS MODERATE 35: CPT | Mod: GC

## 2017-07-11 PROCEDURE — 93750 INTERROGATION VAD IN PERSON: CPT

## 2017-07-11 RX ORDER — WARFARIN SODIUM 2.5 MG/1
3 TABLET ORAL ONCE
Qty: 0 | Refills: 0 | Status: COMPLETED | OUTPATIENT
Start: 2017-07-11 | End: 2017-07-11

## 2017-07-11 RX ORDER — CARVEDILOL PHOSPHATE 80 MG/1
6.25 CAPSULE, EXTENDED RELEASE ORAL EVERY 12 HOURS
Qty: 0 | Refills: 0 | Status: DISCONTINUED | OUTPATIENT
Start: 2017-07-11 | End: 2017-07-17

## 2017-07-11 RX ADMIN — CARVEDILOL PHOSPHATE 6.25 MILLIGRAM(S): 80 CAPSULE, EXTENDED RELEASE ORAL at 17:31

## 2017-07-11 RX ADMIN — Medication 20 MILLIGRAM(S): at 06:03

## 2017-07-11 RX ADMIN — PANTOPRAZOLE SODIUM 40 MILLIGRAM(S): 20 TABLET, DELAYED RELEASE ORAL at 06:03

## 2017-07-11 RX ADMIN — Medication 650 MILLIGRAM(S): at 17:31

## 2017-07-11 RX ADMIN — WARFARIN SODIUM 3 MILLIGRAM(S): 2.5 TABLET ORAL at 21:09

## 2017-07-11 RX ADMIN — Medication 20 MILLIGRAM(S): at 17:31

## 2017-07-11 RX ADMIN — QUETIAPINE FUMARATE 50 MILLIGRAM(S): 200 TABLET, FILM COATED ORAL at 21:09

## 2017-07-11 RX ADMIN — Medication 0.6 MILLIGRAM(S): at 12:54

## 2017-07-11 RX ADMIN — AMIODARONE HYDROCHLORIDE 200 MILLIGRAM(S): 400 TABLET ORAL at 06:03

## 2017-07-11 RX ADMIN — CARVEDILOL PHOSPHATE 3.12 MILLIGRAM(S): 80 CAPSULE, EXTENDED RELEASE ORAL at 06:03

## 2017-07-11 RX ADMIN — Medication 100 MILLIGRAM(S): at 12:54

## 2017-07-11 RX ADMIN — Medication 100 MILLIGRAM(S): at 06:03

## 2017-07-11 RX ADMIN — Medication 650 MILLIGRAM(S): at 18:30

## 2017-07-11 RX ADMIN — SPIRONOLACTONE 12.5 MILLIGRAM(S): 25 TABLET, FILM COATED ORAL at 06:03

## 2017-07-11 NOTE — PROGRESS NOTE ADULT - ASSESSMENT
66y/o M with advanced NICM BiV HFrEF 15-20% on a stable off milrinones/p AICD, with replacement from [facility] to [employer] (2009), PAF on AC and h/o VT, with recent admission for AICD firing, with decompensated heart failure s/p LVAD on 6/12 post op with VT and NORMAN. Patient now with drop in Hgb. s/p EGD and capsule endoscopy. EGD showed  small superficial gastric ulcer, unlikely source of bleed. Video capsule endoscopy reviewed and shows active bleeding in proximal small intestine.  s/p push enteroscopy with APC and clipping of 2 angioectasias in proximal jejunum.Last PRBC given 7/5.

## 2017-07-11 NOTE — PROGRESS NOTE ADULT - ASSESSMENT
67 year old man with congestive heart failure with severely reduced LV systolic function due to a nonischemic dilated cardiomyopathy s/p HM2 LVAD on 6/12 with post-operative course complicated by  ventricular tachycardia and acute renal failure, which is improved and stable.  Acute blood loss anemia postop requiring multiple PRBC transfusions and 6/27 +guiac- GI following DR. James  pt placed on Protonix gttp  7/3 anticoagulation on Hold as Per CHF team    7/3 EGD: non-bleeding small superficial gastric ulcer,    7/4 capsule placed and retrieved / continue protonix gttp;  7/5   PRBC x 1, NPO   Push enteroscopy today for further evaluation at small intestine  7/6 Small bowel capsule showing blood in proximal small bowel.  Two non-bleeding angiodysplastic lesions in the jejunum.  Treated with argon plasma coagulation (APC).  Clips  were placed.  The pt is undergoing continued education and demonstration for discharge planning.    Episodes of wct, coreg increased today as d/w chf

## 2017-07-11 NOTE — PROGRESS NOTE ADULT - SUBJECTIVE AND OBJECTIVE BOX
Im good     VITAL SIGNS    Telemetry:  NSR /  , episode of wct    Vital Signs Last 24 Hrs  T(C): 37.1 (17 @ 07:36), Max: 37.3 (07-10-17 @ 19:12)  T(F): 98.7 (17 @ 07:36), Max: 99.2 (07-10-17 @ 19:12)  HR: 82 (17 @ 07:41) (82 - 105)  BP: --76-84 mean  RR: 16 (17 @ 07:41) (16 - 18)  SpO2: 100% (17 @ 07:41) (98% - 100%)                   07-10 @ 07:01  -   @ 07:00  --------------------------------------------------------  IN: 600 mL / OUT: 1675 mL / NET: -1075 mL     @ 07:01  -   @ 11:33  --------------------------------------------------------  IN: 540 mL / OUT: 600 mL / NET: -60 mL          Daily     Daily Weight in k.5 (2017 06:26)        CAPILLARY BLOOD GLUCOSE                  Coumadin    [x ] YES          [  ]      NO         Reason:     lvad                          PHYSICAL EXAM        Neurology: alert and oriented x 3, nonfocal, no gross deficits    CV : S1 S2 , RRR     Sternal Wound :  CDI , Stable    Lungs: CTA    Abdomen: soft, nontender, nondistended, positive bowel sounds,  driveline dressing cdi    :           voiding    Extremities:        - edema, negative calve tenderness,                                           Discussed with Cardiothoracic Team at AM rounds.

## 2017-07-11 NOTE — PROGRESS NOTE ADULT - ATTENDING COMMENTS
He remains quite enthusiastic and happy. Still needs VAD teaching, but seems to be making progress.  On warfarin and remains off ASA. INR 2.17 and will continue warfarin 3 mg po QHS.  , Hgb stable at 8.1. MAP 74-89 mmHg.  Increase Coreg to 6.25 mg BID with meals for mild hypertension and WCT.  Will get a speed change TTE tomorrow.

## 2017-07-11 NOTE — PROGRESS NOTE ADULT - SUBJECTIVE AND OBJECTIVE BOX
[facility] DIVISION OF KIDNEY DISEASES AND HYPERTENSION -- FOLLOW UP NOTE  --------------------------------------------------------------------------------  Chief Complaint:juan f now resolved    24 hour events/subjective:  No new complaints      PAST HISTORY  --------------------------------------------------------------------------------  No significant changes to PMH, PSH, FHx, SHx, unless otherwise noted    ALLERGIES & MEDICATIONS  --------------------------------------------------------------------------------  Allergies    No Known Allergies    Intolerances      Standing Inpatient Medications  docusate sodium 100 milliGRAM(s) Oral three times a day  amiodarone    Tablet 200 milliGRAM(s) Oral daily  colchicine 0.6 milliGRAM(s) Oral daily  QUEtiapine 50 milliGRAM(s) Oral at bedtime  furosemide    Tablet 20 milliGRAM(s) Oral two times a day  pantoprazole    Tablet 40 milliGRAM(s) Oral before breakfast  spironolactone 12.5 milliGRAM(s) Oral daily  carvedilol 6.25 milliGRAM(s) Oral every 12 hours    PRN Inpatient Medications  acetaminophen   Tablet. 650 milliGRAM(s) Oral every 6 hours PRN      REVIEW OF SYSTEMS  --------------------------------------------------------------------------------  Review Of Systems:  Constitutional: [ ] Fever [ ] Chills [ ] Fatigue [ ] Weight change   HEENT: [ ] Blurred vision [ ] Eye Pain [ ] Headache [ ] Runny nose [ ] Sore Throat   Respiratory: [ ] Cough [ ] Wheezing [ ] Shortness of breath  Cardiovascular: [ ] Chest Pain [ ] Palpitations [ ] LOBATO [ ] PND [ ] Orthopnea  Gastrointestinal: [ ] Abdominal Pain [ ] Diarrhea [ ] Constipation [ ] Hemorrhoids [ ] Nausea [ ] Vomiting  Genitourinary: [ ] Nocturia [ ] Dysuria [ ] Incontinence  Extremities: [ ] Swelling [ ] Joint Pain  Neurologic: [ ] Focal deficit [ ] Paresthesias [ ] Syncope  Lymphatic: [ ] Swelling [ ] Lymphadenopathy   Skin: [ ] Rash [ ] Ecchymoses [ ] Wounds [ ] Lesions  Psychiatry: [ ] Depression [ ] Suicidal/Homicidal Ideation [ ] Anxiety [ ] Sleep Disturbances  [x ] 10 point review of systems is otherwise negative except as mentioned above     [ ]Unable to obtain    All other systems were reviewed and are negative, except as noted.    VITALS/PHYSICAL EXAM  --------------------------------------------------------------------------------  T(C): 37.1 (07-11-17 @ 07:36), Max: 37.3 (07-10-17 @ 19:12)  HR: 82 (07-11-17 @ 07:41) (82 - 105)  BP: --  RR: 16 (07-11-17 @ 07:41) (16 - 18)  SpO2: 100% (07-11-17 @ 07:41) (98% - 100%)  Wt(kg): --      07-10-17 @ 07:01  -  07-11-17 @ 07:00  --------------------------------------------------------  IN: 600 mL / OUT: 1675 mL / NET: -1075 mL    07-11-17 @ 07:01  -  07-11-17 @ 11:05  --------------------------------------------------------  IN: 540 mL / OUT: 600 mL / NET: -60 mL        Physical Exam:  	Gen: NAD, well-appearing  	HEENT: PERRL  	Pulm: CTA B/L  	CV:lvad hum  	Back:  no sacral edema  	Abd: soft, nontender/nondistended  	: No diaz  	LE: Warm, no edema  	Neuro: no asterixis  	Skin: Warm, without rashes  	Vascular access:none    LABS/STUDIES  --------------------------------------------------------------------------------              8.1    11.9  >-----------<  364      [07-11-17 @ 03:52]              24.7     138  |  104  |  18  ----------------------------<  108      [07-11-17 @ 03:52]  4.0   |  22  |  1.14        Ca     8.5     [07-11-17 @ 03:52]    TPro  6.0  /  Alb  2.7  /  TBili  0.9  /  DBili  x   /  AST  50  /  ALT  38  /  AlkPhos  170  [07-11-17 @ 03:52]    PT/INR: PT 24.0 , INR 2.17       [07-11-17 @ 03:51]          [07-11-17 @ 03:52]    Creatinine Trend:  SCr 1.14 [07-11 @ 03:52]  SCr 1.08 [07-10 @ 03:16]  SCr 0.98 [07-09 @ 03:49]  SCr 1.07 [07-08 @ 03:44]  SCr 1.27 [07-07 @ 01:15]    Urinalysis - [06-21-17 @ 18:17]      Color Yellow / Appearance  / SG 1.010 / pH 7.0      Gluc Negative / Ketone Negative  / Bili Negative / Urobili 4       Blood Small / Protein 30 / Leuk Est Moderate / Nitrite Negative      RBC 3-5 / WBC 6-10 / Hyaline  / Gran  / Sq Epi  / Non Sq Epi OCC / Bacteria Few      Iron 20, TIBC 352, %sat 6      [06-08-17 @ 07:14]  Ferritin 121.0      [06-09-17 @ 19:08]  HbA1c 5.5      [06-07-17 @ 06:14]  TSH 2.33      [06-14-17 @ 14:51]  Lipid: chol 62, TG 33, HDL 17, LDL 38      [06-07-17 @ 06:14]      Free Light Chains: kappa 16.30, lambda 11.10, ratio = 1.47      [06-27 @ 15:50]      RADIOLOGY  ---------------------------------------------------------------------------------

## 2017-07-11 NOTE — PROGRESS NOTE ADULT - SUBJECTIVE AND OBJECTIVE BOX
VITAL SIGNS    Telemetry:    Vital Signs Last 24 Hrs  T(C): 36.8 (17 @ 16:30), Max: 37.3 (07-10-17 @ 19:12)  T(F): 98.3 (17 @ 16:30), Max: 99.2 (07-10-17 @ 19:12)  HR: 104 (17 @ 16:30) (82 - 105)  BP: --  RR: 18 (17 @ 16:30) (16 - 18)  SpO2: 100% (17 @ 16:30) (98% - 100%)            07-10 @ 07:01  -   @ 07:00  --------------------------------------------------------  IN: 600 mL / OUT: 1675 mL / NET: -1075 mL     @ 07:01  -   @ 17:47  --------------------------------------------------------  IN: 540 mL / OUT: 1000 mL / NET: -460 mL            Daily Weight in k.5 (2017 06:26)  Admit Wt: Drug Dosing Weight  Height (cm): 172.72 (2017 17:13)  Weight (kg): 75 (2017 15:17)  BMI (kg/m2): 25.1 (2017 17:13)  BSA (m2): 1.89 (2017 17:13)    Bilirubin Total, Serum: 0.9 mg/dL ( @ 03:52)                      MEDICATIONS  docusate sodium 100 milliGRAM(s) Oral three times a day  amiodarone    Tablet 200 milliGRAM(s) Oral daily  colchicine 0.6 milliGRAM(s) Oral daily  QUEtiapine 50 milliGRAM(s) Oral at bedtime  furosemide    Tablet 20 milliGRAM(s) Oral two times a day  pantoprazole    Tablet 40 milliGRAM(s) Oral before breakfast  spironolactone 12.5 milliGRAM(s) Oral daily  acetaminophen   Tablet. 650 milliGRAM(s) Oral every 6 hours PRN  carvedilol 6.25 milliGRAM(s) Oral every 12 hours  warfarin 3 milliGRAM(s) Oral once      PHYSICAL EXAM    Subjective:  Neurology: alert and oriented x 3, nonfocal, no gross deficits  CV : + hum of VAD  Sternal Wound :  CDI , Stable  Lungs: clear  Abdomen: soft, nontender, nondistended, positive bowel sounds, last bowel movement   :  Voiding  Extremities:  no C/C/E        LABS      138  |  104  |  18  ----------------------------<  108<H>  4.0   |  22  |  1.14    Ca    8.5      2017 03:52    TPro  6.0  /  Alb  2.7<L>  /  TBili  0.9  /  DBili  x   /  AST  50<H>  /  ALT  38  /  AlkPhos  170<H>                                   8.1    11.9  )-----------( 364      ( 2017 03:52 )             24.7          PT/INR - ( 2017 03:51 )   PT: 24.0 sec;   INR: 2.17 ratio         PAST MEDICAL & SURGICAL HISTORY:  Ventricular fibrillation: s/p AICD  PAF (paroxysmal atrial fibrillation): on xarelto  Non-Ischemic Cardiomyopathy  SVT (Supraventricular Tachycardia)  HTN  CHF (Congestive Heart Failure)  Status post left hip replacement  History of Prior Ablation Treatment: for afib  AICD (Automatic Cardioverter/Defibrillator) Present: [employer] with 1 [employer] lead09- explanted and replaced with [employer] 2 leads on 09       ASSESSMENT  S/p VAD Implantation                 , POD # 29    Physical Therapy Rec:   Home  [  ]   Home w/ PT  [  ]  Rehab  [ X ]      Postoperative Course/Issues:     PLAN  Pain management  Pulm: Encourage coughing, deep breathing and use of incentive spirometry.   Cardio: Continue Aspirin, Lipitor,    GI: Tolerating diet.   Renal: Keep negative fluid balance. (Diuretics) Trend BUN/Cr. Supplement electrolytes prn.   :  voiding  Therapy: PT daily as tolerated.  Disposition: Discharge : to [facility]    Discussed with Cardiothoracic Team at AM rounds.    LVAD Interrogation: No alarms noted  Pump Flow: 5.0  Pump Speed: 9200  Pulse Index: 6.7  Pump Power: 5.5  VAD Events: none  Driveline evaluation:  C/D/I  Programming Changes: [X ] No changes made [ ] Changes made:

## 2017-07-11 NOTE — PROGRESS NOTE ADULT - PROBLEM SELECTOR PLAN 2
discharge planning- eventually rehab  Plan for echo today 7/11 or 7/12 with Dr Mccabe for lvad adjust

## 2017-07-11 NOTE — CHART NOTE - NSCHARTNOTEFT_GEN_A_CORE
Source: Patient [ X]    Family [ ]     other [X ]    Pt with end stage cardiomyopathy, cardiogenic shock s/p LVAD, NORMAN on CKD post LVAD- renal function continues to improve. GI bleed, s/p push enteroscopy with clipping.  Pt reports feeling well, was amenable to HF and coumadin/vitamin K drug interaction diet education. Pt is using teach back points with encouragement, and verbalizes understanding and importance of diet modifications      Diet : DASH      Patient reports [ ] nausea  [ ] vomiting [ ] diarrhea [ ] constipation  [ ]chewing problems [ ] swallowing issues  [ X] other: None      PO intake:  < 50% [ ] 50-75% [ ]   % [ X]  other :     Source for PO intake [X ] Patient [ ] family [ ] chart [ ] staff [ ] other     Enteral /Parenteral Nutrition: None      Current Weight: 65.5kg, increase of 0.5kg from previous RD follow up; likely related to increased PO intake, continue to monitor daily wt   % Weight Change    Pertinent Medications: MEDICATIONS  (STANDING):  docusate sodium 100 milliGRAM(s) Oral three times a day  amiodarone    Tablet 200 milliGRAM(s) Oral daily  colchicine 0.6 milliGRAM(s) Oral daily  QUEtiapine 50 milliGRAM(s) Oral at bedtime  furosemide    Tablet 20 milliGRAM(s) Oral two times a day  pantoprazole    Tablet 40 milliGRAM(s) Oral before breakfast  spironolactone 12.5 milliGRAM(s) Oral daily  carvedilol 6.25 milliGRAM(s) Oral every 12 hours  warfarin 3 milliGRAM(s) Oral once    MEDICATIONS  (PRN):  acetaminophen   Tablet. 650 milliGRAM(s) Oral every 6 hours PRN Mild Pain (1 - 3)    Pertinent Labs:  Hgb/Hct:8.1/24.7-low, Na:138, K:4.0, Cl:104, BUN:18, Cr:1.14, Glucose:108-high, Ca:8.5, Total Protein:6.0, Albumin:2,7-low, Total Bilirubin:0.9, , AlkPhos:170-high, AST:50-high, ALT:38,         Skin: intact, +1 tara ankle edema     Estimated Needs:   [X ] no change since previous assessment  [ ] recalculated:       Previous Nutrition Diagnosis:   Food & Nutrition Related Knowledge Deficit   Malnutrition     Nutrition Diagnosis is [ X] ongoing  [ ] resolved [ ] not applicable   Improving with improved PO Intake and diet education reinforcement          New Nutrition Diagnosis: [X ] not applicable       Interventions:     Recommend    [ ] Change Diet To: Continue DASH diet     [ ] Nutrition Supplement: Pt denies need for Ensure enlive at this time, Pt receiving health shakes which he consumes daily     [ ] Nutrition Support    [X ] Other: 1. Provide food preferences as requested by Pt/family within diet restrictions  2. Encourage PO intake during meal times        Monitoring and Evaluation:     [X] PO intake [X ] Tolerance to diet prescription [X ] weights [ X] follow up per protocol    [ X] other: RD remains available, Sarah Siegler RD. Pager #[phone]

## 2017-07-11 NOTE — PROGRESS NOTE ADULT - PROBLEM SELECTOR PLAN 1
hemodynamically mediated in setting of decompensated heart failure +/- urinary retention component and hypotension.  Pt cr at baseline Pt with  good urine output.  Monitor BMP  Strict I/O, avoid nephrotoxics, NSAIDs. Daily weights  Will continue to follow intermittently

## 2017-07-11 NOTE — PROGRESS NOTE ADULT - SUBJECTIVE AND OBJECTIVE BOX
docusate sodium 100 milliGRAM(s) Oral three times a day  amiodarone    Tablet 200 milliGRAM(s) Oral daily  colchicine 0.6 milliGRAM(s) Oral daily  QUEtiapine 50 milliGRAM(s) Oral at bedtime  carvedilol 3.125 milliGRAM(s) Oral every 12 hours  furosemide    Tablet 20 milliGRAM(s) Oral two times a day  pantoprazole    Tablet 40 milliGRAM(s) Oral before breakfast  spironolactone 12.5 milliGRAM(s) Oral daily  acetaminophen   Tablet. 650 milliGRAM(s) Oral every 6 hours PRN        REVIEW OF SYSTEMS:  Vital Signs Last 24 Hrs  T(C): 36.4 (2017 05:30), Max: 37.3 (10 Jul 2017 19:12)  T(F): 97.6 (2017 05:30), Max: 99.2 (10 Jul 2017 19:12)  HR: 100 (2017 05:30) (85 - 105)  BP: --  BP(mean): 86 (2017 05:30) (82 - 89)  RR: 18 (2017 05:30) (18 - 18)  SpO2: 100% (2017 05:30) (98% - 100%)    PHYSICAL EXAM:  General: A/ox 3, No acute Distress  Neck: Supple, NO JVD  Cardiac: S1 S2, No M/R/GJVD < 6 cm H2O, LVAD hum audible  Pulmonary: CTAB, Breathing unlabored, No Rhonchi/Rales/Wheezing  Abdomen: Soft, Non -tender, +BS   Extremities: No Rashes, No edema  Neuro: A/o x 3, No focal deficits  Psch: normal mood , normal affect    LVAD Interrogation:  Pump Flow:5.5  Pump Speed: 9200  Pulse Index:5.9  Pump Power:5.7  VAD Events: # PI events over past three days  Driveline evaluation: Clean and intact   Programming Changes: [X ] No changes made [ ] Changes madeLABS:  cret                        8.1    11.9  )-----------( 364      ( 2017 03:52 )             24.7     07-11    138  |  104  |  18  ----------------------------<  108<H>  4.0   |  22  |  1.14    Ca    8.5      2017 03:52    TPro  6.0  /  Alb  2.7<L>  /  TBili  0.9  /  DBili  x   /  AST  50<H>  /  ALT  38  /  AlkPhos  170<H>      PT/INR - ( 2017 03:51 )   PT: 24.0 sec;   INR: 2.17 ratio           Daily     Daily Weight in k.5 (2017 06:26)  I&O's Detail    10 Jul 2017 07:01  -  2017 07:00  --------------------------------------------------------  IN:    Oral Fluid: 600 mL  Total IN: 600 mL    OUT:    Voided: 1675 mL  Total OUT: 1675 mL    Total NET: -1075 mL Pt sitting in chair feeling well with no c/o   Overnight Tele with 10 beats WCT asymptomatic     docusate sodium 100 milliGRAM(s) Oral three times a day  amiodarone    Tablet 200 milliGRAM(s) Oral daily  colchicine 0.6 milliGRAM(s) Oral daily  QUEtiapine 50 milliGRAM(s) Oral at bedtime  carvedilol 3.125 milliGRAM(s) Oral every 12 hours  furosemide    Tablet 20 milliGRAM(s) Oral two times a day  pantoprazole    Tablet 40 milliGRAM(s) Oral before breakfast  spironolactone 12.5 milliGRAM(s) Oral daily  acetaminophen   Tablet. 650 milliGRAM(s) Oral every 6 hours PRN        REVIEW OF SYSTEMS:  Vital Signs Last 24 Hrs  T(C): 36.4 (2017 05:30), Max: 37.3 (10 Jul 2017 19:12)  T(F): 97.6 (2017 05:30), Max: 99.2 (10 Jul 2017 19:12)  HR: 100 (2017 05:30) (85 - 105)  BP: --  BP(mean): 86 (2017 05:30) (82 - 89)  RR: 18 (2017 05:30) (18 - 18)  SpO2: 100% (2017 05:30) (98% - 100%)    PHYSICAL EXAM:  General: A/ox 3, No acute Distress  Neck: Supple,    Cardiac: S1 S2, No M/R/G JVD < 6 cm H2O, LVAD hum audible  Pulmonary: CTAB, Breathing unlabored, No Rhonchi/Rales/Wheezing  Abdomen: Soft, Non -tender, +BS   Extremities: No Rashes, No edema  Neuro: A/o x 3, No focal deficits  Psch: normal mood , normal affect    LVAD Interrogation:  Pump Flow:5.5  Pump Speed: 9200  Pulse Index:5.9  Pump Power:5.7  VAD Events: # PI events over past three days  Driveline evaluation: Clean and intact   Programming Changes: [X ] No changes made [ ] Changes madeLABS:  cret                        8.1    11.9  )-----------( 364      ( 2017 03:52 )             24.7     07-11    138  |  104  |  18  ----------------------------<  108<H>  4.0   |  22  |  1.14    Ca    8.5      2017 03:52    TPro  6.0  /  Alb  2.7<L>  /  TBili  0.9  /  DBili  x   /  AST  50<H>  /  ALT  38  /  AlkPhos  170<H>  0711    PT/INR - ( 2017 03:51 )   PT: 24.0 sec;   INR: 2.17 ratio       Lactate 248    Daily     Daily Weight in k.5 (2017 06:26)  I&O's Detail    10 Jul 2017 07:01  -  2017 07:00  --------------------------------------------------------  IN:    Oral Fluid: 600 mL  Total IN: 600 mL    OUT:    Voided: 1675 mL  Total OUT: 1675 mL    Total NET: -1075 mL Pt sitting in chair feeling well with no c/o   Overnight Tele with 10 beats WCT asymptomatic     docusate sodium 100 milliGRAM(s) Oral three times a day  amiodarone    Tablet 200 milliGRAM(s) Oral daily  colchicine 0.6 milliGRAM(s) Oral daily  QUEtiapine 50 milliGRAM(s) Oral at bedtime  carvedilol 3.125 milliGRAM(s) Oral every 12 hours  furosemide    Tablet 20 milliGRAM(s) Oral two times a day  pantoprazole    Tablet 40 milliGRAM(s) Oral before breakfast  spironolactone 12.5 milliGRAM(s) Oral daily  acetaminophen   Tablet. 650 milliGRAM(s) Oral every 6 hours PRN        REVIEW OF SYSTEMS:  Vital Signs Last 24 Hrs  T(C): 36.4 (2017 05:30), Max: 37.3 (10 Jul 2017 19:12)  T(F): 97.6 (2017 05:30), Max: 99.2 (10 Jul 2017 19:12)  HR: 100 (2017 05:30) (85 - 105)  BP: --  BP(mean): 86 (2017 05:30) (82 - 89)  RR: 18 (2017 05:30) (18 - 18)  SpO2: 100% (2017 05:30) (98% - 100%)    PHYSICAL EXAM:  General: A/ox 3, No acute Distress  Neck: Supple,    Cardiac: S1 S2, No M/R/G JVD < 6 cm H2O, LVAD hum audible  Pulmonary: CTAB, Breathing unlabored, No Rhonchi/Rales/Wheezing  Abdomen: Soft, Non -tender, +BS   Extremities: No Rashes, No edema  Neuro: A/o x 3, No focal deficits  Psch: normal mood , normal affect    LVAD Interrogation:  Pump Flow:5.5  Pump Speed: 9200  Pulse Index:5.9  Pump Power:5.7  VAD Events: # PI events over past three days  Driveline evaluation: Clean and intact   Programming Changes: [X ] No changes made [ ] Changes madeLABS:  cret                        8.1    11.9  )-----------( 364      ( 2017 03:52 )             24.7     07-11    138  |  104  |  18  ----------------------------<  108<H>  4.0   |  22  |  1.14    Ca    8.5      2017 03:52    TPro  6.0  /  Alb  2.7<L>  /  TBili  0.9  /  DBili  x   /  AST  50<H>  /  ALT  38  /  AlkPhos  170<H>  0711    PT/INR - ( 2017 03:51 )   PT: 24.0 sec;   INR: 2.17 ratio          Daily     Daily Weight in k.5 (2017 06:26)  I&O's Detail    10 Jul 2017 07:01  -  2017 07:00  --------------------------------------------------------  IN:    Oral Fluid: 600 mL  Total IN: 600 mL    OUT:    Voided: 1675 mL  Total OUT: 1675 mL    Total NET: -1075 mL

## 2017-07-11 NOTE — PROGRESS NOTE ADULT - ATTENDING COMMENTS
LVAD with resolved NORMAN  Net negative on diuretics  Renal function stable  Will follow intermittently as needed    LAUREN Spence  [id number]

## 2017-07-12 LAB
ALBUMIN SERPL ELPH-MCNC: 2.9 G/DL — LOW (ref 3.3–5)
ALP SERPL-CCNC: 172 U/L — HIGH (ref 40–120)
ALT FLD-CCNC: 40 U/L RC — SIGNIFICANT CHANGE UP (ref 10–45)
ANION GAP SERPL CALC-SCNC: 11 MMOL/L — SIGNIFICANT CHANGE UP (ref 5–17)
AST SERPL-CCNC: 50 U/L — HIGH (ref 10–40)
BILIRUB SERPL-MCNC: 0.8 MG/DL — SIGNIFICANT CHANGE UP (ref 0.2–1.2)
BUN SERPL-MCNC: 22 MG/DL — SIGNIFICANT CHANGE UP (ref 7–23)
CALCIUM SERPL-MCNC: 8.4 MG/DL — SIGNIFICANT CHANGE UP (ref 8.4–10.5)
CHLORIDE SERPL-SCNC: 103 MMOL/L — SIGNIFICANT CHANGE UP (ref 96–108)
CO2 SERPL-SCNC: 24 MMOL/L — SIGNIFICANT CHANGE UP (ref 22–31)
CREAT SERPL-MCNC: 1.32 MG/DL — HIGH (ref 0.5–1.3)
GLUCOSE SERPL-MCNC: 99 MG/DL — SIGNIFICANT CHANGE UP (ref 70–99)
HCT VFR BLD CALC: 25 % — LOW (ref 39–50)
HGB BLD-MCNC: 8.3 G/DL — LOW (ref 13–17)
INR BLD: 2.54 RATIO — HIGH (ref 0.88–1.16)
MCHC RBC-ENTMCNC: 31.1 PG — SIGNIFICANT CHANGE UP (ref 27–34)
MCHC RBC-ENTMCNC: 33.1 GM/DL — SIGNIFICANT CHANGE UP (ref 32–36)
MCV RBC AUTO: 94 FL — SIGNIFICANT CHANGE UP (ref 80–100)
PLATELET # BLD AUTO: 351 K/UL — SIGNIFICANT CHANGE UP (ref 150–400)
POTASSIUM SERPL-MCNC: 4.2 MMOL/L — SIGNIFICANT CHANGE UP (ref 3.5–5.3)
POTASSIUM SERPL-SCNC: 4.2 MMOL/L — SIGNIFICANT CHANGE UP (ref 3.5–5.3)
PROT SERPL-MCNC: 6.3 G/DL — SIGNIFICANT CHANGE UP (ref 6–8.3)
PROTHROM AB SERPL-ACNC: 28.2 SEC — HIGH (ref 9.8–12.7)
RBC # BLD: 2.66 M/UL — LOW (ref 4.2–5.8)
RBC # FLD: 16.9 % — HIGH (ref 10.3–14.5)
SODIUM SERPL-SCNC: 138 MMOL/L — SIGNIFICANT CHANGE UP (ref 135–145)
WBC # BLD: 12.7 K/UL — HIGH (ref 3.8–10.5)
WBC # FLD AUTO: 12.7 K/UL — HIGH (ref 3.8–10.5)

## 2017-07-12 PROCEDURE — 99233 SBSQ HOSP IP/OBS HIGH 50: CPT | Mod: 25

## 2017-07-12 PROCEDURE — 93750 INTERROGATION VAD IN PERSON: CPT

## 2017-07-12 PROCEDURE — 93306 TTE W/DOPPLER COMPLETE: CPT | Mod: 26

## 2017-07-12 RX ORDER — FUROSEMIDE 40 MG
20 TABLET ORAL DAILY
Qty: 0 | Refills: 0 | Status: DISCONTINUED | OUTPATIENT
Start: 2017-07-13 | End: 2017-07-17

## 2017-07-12 RX ORDER — WARFARIN SODIUM 2.5 MG/1
3 TABLET ORAL ONCE
Qty: 0 | Refills: 0 | Status: DISCONTINUED | OUTPATIENT
Start: 2017-07-12 | End: 2017-07-12

## 2017-07-12 RX ORDER — WARFARIN SODIUM 2.5 MG/1
2.5 TABLET ORAL ONCE
Qty: 0 | Refills: 0 | Status: COMPLETED | OUTPATIENT
Start: 2017-07-12 | End: 2017-07-12

## 2017-07-12 RX ADMIN — Medication 100 MILLIGRAM(S): at 22:05

## 2017-07-12 RX ADMIN — Medication 650 MILLIGRAM(S): at 15:38

## 2017-07-12 RX ADMIN — WARFARIN SODIUM 2.5 MILLIGRAM(S): 2.5 TABLET ORAL at 22:05

## 2017-07-12 RX ADMIN — Medication 100 MILLIGRAM(S): at 05:14

## 2017-07-12 RX ADMIN — CARVEDILOL PHOSPHATE 6.25 MILLIGRAM(S): 80 CAPSULE, EXTENDED RELEASE ORAL at 05:14

## 2017-07-12 RX ADMIN — PANTOPRAZOLE SODIUM 40 MILLIGRAM(S): 20 TABLET, DELAYED RELEASE ORAL at 05:15

## 2017-07-12 RX ADMIN — SPIRONOLACTONE 12.5 MILLIGRAM(S): 25 TABLET, FILM COATED ORAL at 05:15

## 2017-07-12 RX ADMIN — AMIODARONE HYDROCHLORIDE 200 MILLIGRAM(S): 400 TABLET ORAL at 05:14

## 2017-07-12 RX ADMIN — Medication 0.6 MILLIGRAM(S): at 15:08

## 2017-07-12 RX ADMIN — Medication 650 MILLIGRAM(S): at 15:08

## 2017-07-12 RX ADMIN — Medication 100 MILLIGRAM(S): at 15:08

## 2017-07-12 RX ADMIN — CARVEDILOL PHOSPHATE 6.25 MILLIGRAM(S): 80 CAPSULE, EXTENDED RELEASE ORAL at 17:53

## 2017-07-12 RX ADMIN — QUETIAPINE FUMARATE 50 MILLIGRAM(S): 200 TABLET, FILM COATED ORAL at 22:05

## 2017-07-12 RX ADMIN — Medication 20 MILLIGRAM(S): at 05:14

## 2017-07-12 RX ADMIN — Medication 650 MILLIGRAM(S): at 01:02

## 2017-07-12 NOTE — PROGRESS NOTE ADULT - ATTENDING COMMENTS
Feeling really well. VAD teaching has gone well.   On warfarin and remains off ASA. INR 2.54. Will reduce warfarin to 2.5 mg po QHS.  , Hgb stable at 8.3. MAP mostly 90s, with occasional drops to mid-70s.  Reduce lasix to 20 mg po daily as his volume status looks very good. JVP 6 cm H2O.    Still having short bursts of NSVT. On Coreg 6.25 mg BID. TTE performed at bedside and personally reviewed. LVEDD ~6.5 cm with mild-mod MR, septum bows slightly to the RV. The inflow cannula, though, appears to be very close to the septum in the 4-chamber view. Since he is not symptomatic, I am not going to increase the speed as there is potential for the VAD cannula to interact with the septum.    VAD interrogation has been performed daily. No alarms or events. No changes made. Will not increase BP meds yet given some drops, but will continue to follow.

## 2017-07-12 NOTE — DISCHARGE NOTE ADULT - ADDITIONAL INSTRUCTIONS
LVAd clinic appointment made for 1. Follow up with Dr. Stahl [facility] on Thursday 7/20/17, please call the office to schedule a time for your appointment [phone].

## 2017-07-12 NOTE — PROGRESS NOTE ADULT - SUBJECTIVE AND OBJECTIVE BOX
HPI:  68y/o M with advanced NICM BiV HFrEF 15-20% on a stable dose of milrinone at 3mcg/kg/min via PICC s/p AICD, with replacement from [facility] to [employer] (), PAF on AC and h/o VT, with recent admission for AICD firing, now presenting with dizziness and nausea/vomiting X 1 Day. He says that he was doing well earlier in the day and felt like his usual self. Normally he only walks from his living room to the front steps and this afternoon he decided to walk around a city block, which is significantly longer than he normally walks for. A while after that he developed nausea and vomited NBNB 3x and felt dizzy. ROS otherwise negative for f/c/ns/cp/abd p/diarrhea/consitpation, no syncope; no changes in medication, taking as prescribed. He says that his normal BP is around 90 systolic.    Patient initially presented to [facility], seen by cardiology. Labs noteable for pro bnp 3424, Trop T 0.08, CK 50, hyponatremia to 133 (from 135 two weeks prior), INR elevated at 4.36, Cr 2.07 from BL 0.9-1, BR 1.8. EKG showing NSR with first degree block. (2017 00:32)    PMH:   H/O prior ablation treatment  Ventricular fibrillation  PAF (paroxysmal atrial fibrillation)  Non-Ischemic Cardiomyopathy  SVT (Supraventricular Tachycardia)  HTN  CHF (Congestive Heart Failure)    PSH:   Status post left hip replacement  Hypertension  Hypertension  History of Prior Ablation Treatment  AICD (Automatic Cardioverter/Defibrillator) Present    Medications:   docusate sodium 100 milliGRAM(s) Oral three times a day  amiodarone    Tablet 200 milliGRAM(s) Oral daily  colchicine 0.6 milliGRAM(s) Oral daily  QUEtiapine 50 milliGRAM(s) Oral at bedtime  pantoprazole    Tablet 40 milliGRAM(s) Oral before breakfast  spironolactone 12.5 milliGRAM(s) Oral daily  acetaminophen   Tablet. 650 milliGRAM(s) Oral every 6 hours PRN  carvedilol 6.25 milliGRAM(s) Oral every 12 hours  warfarin 3 milliGRAM(s) Oral once    Allergies:  No Known Allergies    FAMILY HISTORY:  No pertinent family history in first degree relatives    Social History:  Smoking:  Alcohol:  Drugs:              Review of Systems:  Constitutional: [ ] Fever [ ] Chills [ ] Fatigue [ ] Weight change   HEENT: [ ] Blurred vision [ ] Eye Pain [ ] Headache [ ] Runny nose [ ] Sore Throat   Respiratory: [ ] Cough [ ] Wheezing [ ] Shortness of breath  Cardiovascular: [ ] Chest Pain [ ] Palpitations [ ] LOBATO [ ] PND [ ] Orthopnea  Gastrointestinal: [ ] Abdominal Pain [ ] Diarrhea [ ] Constipation [ ] Hemorrhoids [ ] Nausea [ ] Vomiting  Genitourinary: [ ] Nocturia [ ] Dysuria [ ] Incontinence  Extremities: [ ] Swelling [ ] Joint Pain  Neurologic: [ ] Focal deficit [ ] Paresthesias [ ] Syncope  Lymphatic: [ ] Swelling [ ] Lymphadenopathy   Skin: [ ] Rash [ ] Ecchymoses [ ] Wounds [ ] Lesions  Psychiatry: [ ] Depression [ ] Suicidal/Homicidal Ideation [ ] Anxiety [ ] Sleep Disturbances  [X ] 10 point review of systems is otherwise negative except as mentioned above            [ ]Unable to obtain    Physical Exam:  T(C): 36.6 (17 @ 17:00), Max: 36.7 (17 @ 00:45)  HR: 80 (17 @ 17:00) (80 - 98)  BP: 97/71 (17 @ 13:00) (97/71 - 97/71)  RR: 18 (17 @ 17:00) (17 - 18)  SpO2: 100% (17 @ 17:00) (97% - 100%)  Wt(kg): --  I&O's Detail    2017 07:  -  2017 07:00  --------------------------------------------------------  IN:    Oral Fluid: 980 mL  Total IN: 980 mL    OUT:    Voided: 1551 mL  Total OUT: 1551 mL    Total NET: -571 mL      2017 07:  -  2017 21:38  --------------------------------------------------------  IN:    Oral Fluid: 620 mL  Total IN: 620 mL    OUT:    Voided: 1400 mL  Total OUT: 1400 mL    Total NET: -780 mL          Daily Weight in k.6 (2017 15:10), Weight in k.6 (2017 13:00), Weight in k.5 (2017 06:26), Weight in k.1 (10 Jul 2017 06:34), Weight in k.5 (2017 05:57), Weight in k.2 (2017 05:00), Weight in k (2017 06:00)    Appearance: [ ] Normal [ ] NAD  Eyes: [x ] PERRL [ ] EOMI  HENT: [x ] Normal oral [place] [ ]NC/AT  Cardiovascular: [ ] S1 [ ] S2 [ ] RRR [ x] No m/r/g, +hum of vad  Procedural Access Site: [ ] No hematoma [ ] Non-tender to palpation [ ] 2+ pulse [ ] No bruit [ ] No Ecchymosis  Respiratory: [X ] Clear to auscultation bilaterally  Gastrointestinal: [X ] Soft [X ] Non-tender [X ] Non-distended [X ] BS+  Musculoskeletal: [ ] No clubbing [ ] No joint deformity   Neurologic: [X ] Non-focal  Lymphatic: [ ] No lymphadenopathy  Psychiatry: [X ] AAOx3 [ ] Mood & affect appropriate  Skin: [X ] No rashes [ ] No ecchymoses [ ] No cyanosis    LVAD Interrogation: no alarms  Pump Flow: 5.4  Pump Speed: 9200  Pulse Index: 6.0  Pump Power: 5.7  VAD Events: none  Driveline evaluation:  driveline dressing changed with his brother Nawaf watching, site is intact no drainage and no erythema noted. He has a bit of old tissue which was removed  Programming Changes: [X ] No changes made [ ] Changes made:  ECHO done and reviewed by Dr. Mccabe    Labs:                        8.3    12.7  )-----------( 351      ( 2017 04:59 )             25.0     07-12    138  |  103  |  22  ----------------------------<  99  4.2   |  24  |  1.32<H>    Ca    8.4      2017 04:59    TPro  6.3  /  Alb  2.9<L>  /  TBili  0.8  /  DBili  x   /  AST  50<H>  /  ALT  40  /  AlkPhos  172<H>  07-12    PT/INR - ( 2017 04:59 )   PT: 28.2 sec;   INR: 2.54 ratio           Patient completed Discharge test and passed.  I reviewed changing of the controller with his brother.  Patient is cleared to go to rehab in the am

## 2017-07-12 NOTE — DISCHARGE NOTE ADULT - PATIENT PORTAL LINK FT
“You can access the Follow[employer] Patient Portal, offered by [facility], by registering with the following website: [email or web address]”

## 2017-07-12 NOTE — DISCHARGE NOTE ADULT - PROVIDER TOKENS
ROBERT:'[id number]',ROBERT:'[id number]' TOKEN:'[id number]:'[id number]:'[id number]'

## 2017-07-12 NOTE — DISCHARGE NOTE ADULT - CARE PLAN
Principal Discharge DX:	LVAD (left ventricular assist device) present  Goal:	full recovery after LVAD implant  Instructions for follow-up, activity and diet:	1. Daily Shower  2. Weight yourself daily and notify any weight gain greater than 2-3 pounds in 24 hours.  3. Regular diet - low fat, low cholesterol, no added salt.  4. Cleanse Midsternal incision and leg incision daily while showering with warm water and mild soap, pat dry and maintain open to air.   5. Follow Cardiac Surgery Do's and Don'ts discharge instructions.   6. No driving until cleared by MD.   7. No heavy lifting nothing greater than 5 pounds until cleared by MD.   8. Call / Notify MD any fever greater than 101.0  9. Increase Activity as tolerated.  Secondary Diagnosis:	CHF (congestive heart failure), NYHA class IV, unspecified failure chronicity, unspecified type  Goal:	You will not be short of breath.  Instructions for follow-up, activity and diet:	Take your medications as prescribed. Follow a low-salt, low salt, low cholesterol heart healthy diet. Weigh yourself every day and keep a record; call your doctor if you gain 2 pounds over one to two days or 5 pounds over three days. Get to or maintain a healthy weight; ask your heart failure team for referrals to a registered dietitian if needed. Avoid alcohol. Be active (check with your physician or cardiologist first). Find healthy ways to deal with stress, such as deep breathing, meditation, exercise, and doing hobbies that you enjoy. If you smoke, quit. (A resource to help you stop smoking is the [facility] – phone number [phone].). Principal Discharge DX:	LVAD (left ventricular assist device) present  Goal:	full recovery after LVAD implant  Instructions for follow-up, activity and diet:	1. Sponge bath daily   2. Weight yourself daily and notify any weight gain greater than 2-3 pounds in 24 hours.  3. Regular diet - low fat, low cholesterol, no added salt.  4. Cleanse Midsternal incision and leg incision daily with warm water and mild soap, pat dry and maintain open to air.   5. Follow Cardiac Surgery Do's and Don'ts discharge instructions.   6. No driving until cleared by MD.   7. No heavy lifting nothing greater than 5 pounds until cleared by MD.   8. Call / Notify MD any fever greater than 101.0  9. Increase Activity as tolerated.  10. Daily PT   11. Blood draw CBC and SMA7 twice a week every Monday and Thursday and PRN   12. Change Drive line dressing daily using sterile technic.  13. Please have your PT/INR levels checked weekly after discharged from rehab, please have all blood work fax to Neva Chand NP and follow for your coumadin dosage.  Secondary Diagnosis:	CHF (congestive heart failure), NYHA class IV, unspecified failure chronicity, unspecified type  Goal:	You will not be short of breath.  Instructions for follow-up, activity and diet:	Take your medications as prescribed. Follow a low-salt, low salt, low cholesterol heart healthy diet. Weigh yourself every day and keep a record; call your doctor if you gain 2 pounds over one to two days or 5 pounds over three days. Get to or maintain a healthy weight; ask your heart failure team for referrals to a registered dietitian if needed. Avoid alcohol. Be active (check with your physician or cardiologist first). Find healthy ways to deal with stress, such as deep breathing, meditation, exercise, and doing hobbies that you enjoy. If you smoke, quit. (A resource to help you stop smoking is the [facility] – phone number [phone].). Principal Discharge DX:	LVAD (left ventricular assist device) present  Goal:	full recovery after LVAD implant  Instructions for follow-up, activity and diet:	1. Sponge bath daily   2. Weight yourself daily and notify any weight gain greater than 2-3 pounds in 24 hours.  3. Regular diet - low fat, low cholesterol, no added salt.  4. Cleanse Midsternal incision and leg incision daily with warm water and mild soap, pat dry and maintain open to air.   5. Follow Cardiac Surgery Do's and Don'ts discharge instructions.   6. No driving until cleared by MD.   7. No heavy lifting nothing greater than 5 pounds until cleared by MD.   8. Call / Notify MD any fever greater than 101.0  9. Increase Activity as tolerated.  10. Daily PT   11. Blood draw CBC and SMA7 twice a week every Monday and Thursday and PRN   12. Change Drive line dressing daily using sterile technic.  13. Please have your PT/INR levels checked weekly after discharged from rehab, please have all blood work fax to Neva Chand NP and follow for your coumadin dosage.  Secondary Diagnosis:	CHF (congestive heart failure), NYHA class IV, unspecified failure chronicity, unspecified type  Goal:	You will not be short of breath.  Instructions for follow-up, activity and diet:	Take your medications as prescribed. Follow a low-salt, low salt, low cholesterol heart healthy diet. Weigh yourself every day and keep a record; call your doctor if you gain 2 pounds over one to two days or 5 pounds over three days. Get to or maintain a healthy weight; ask your heart failure team for referrals to a registered dietitian if needed. Avoid alcohol. Be active (check with your physician or cardiologist first). Find healthy ways to deal with stress, such as deep breathing, meditation, exercise, and doing hobbies that you enjoy. If you smoke, quit. (A resource to help you stop smoking is the [facility] – phone number [phone].). Principal Discharge DX:	LVAD (left ventricular assist device) present  Goal:	full recovery after LVAD implant  Instructions for follow-up, activity and diet:	1. Sponge bath daily   2. Weight yourself daily and notify any weight gain greater than 2-3 pounds in 24 hours.  3. Regular diet - low fat, low cholesterol, no added salt.  4. Cleanse Midsternal incision and leg incision daily with warm water and mild soap, pat dry and maintain open to air.   5. Follow Cardiac Surgery Do's and Don'ts discharge instructions.   6. No driving until cleared by MD.   7. No heavy lifting nothing greater than 5 pounds until cleared by MD.   8. Call / Notify MD any fever greater than 101.0  9. Increase Activity as tolerated.  10. Daily PT   11. Blood draw CBC and SMA7 twice a week every Monday and Thursday and PRN   12. Change Drive line dressing daily using sterile technic.  13. Please have your PT/INR levels checked weekly after discharged from rehab, please have all blood work fax to Neva Chand NP and follow for your coumadin dosage.  Secondary Diagnosis:	CHF (congestive heart failure), NYHA class IV, unspecified failure chronicity, unspecified type  Goal:	You will not be short of breath.  Instructions for follow-up, activity and diet:	Take your medications as prescribed. Follow a low-salt, low salt, low cholesterol heart healthy diet. Weigh yourself every day and keep a record; call your doctor if you gain 2 pounds over one to two days or 5 pounds over three days. Get to or maintain a healthy weight; ask your heart failure team for referrals to a registered dietitian if needed. Avoid alcohol. Be active (check with your physician or cardiologist first). Find healthy ways to deal with stress, such as deep breathing, meditation, exercise, and doing hobbies that you enjoy. If you smoke, quit. (A resource to help you stop smoking is the [facility] – phone number [phone].). Principal Discharge DX:	LVAD (left ventricular assist device) present  Goal:	full recovery after LVAD implant  Instructions for follow-up, activity and diet:	1. Sponge bath daily   2. Weight yourself daily and notify any weight gain greater than 2-3 pounds in 24 hours.  3. Regular diet - low fat, low cholesterol, no added salt.  4. Cleanse Midsternal incision and leg incision daily with warm water and mild soap, pat dry and maintain open to air.   5. Follow Cardiac Surgery Do's and Don'ts discharge instructions.   6. No driving until cleared by MD.   7. No heavy lifting nothing greater than 5 pounds until cleared by MD.   8. Call / Notify MD any fever greater than 101.0  9. Increase Activity as tolerated.  10. Daily PT   11. Blood draw CBC and SMA7 twice a week every Monday and Thursday and PRN   12. Change Drive line dressing weekly every Monday and PRN using sterile technic.  13. Please have your PT/INR drawn on Thursday 7/20/17 and then weekly. After discharged from rehab, please have all blood work fax to Neva Chand NP and follow for your coumadin dosage.  Secondary Diagnosis:	CHF (congestive heart failure), NYHA class IV, unspecified failure chronicity, unspecified type  Goal:	You will not be short of breath.  Instructions for follow-up, activity and diet:	Take your medications as prescribed. Follow a low-salt, low salt, low cholesterol heart healthy diet. Weigh yourself every day and keep a record; call your doctor if you gain 2 pounds over one to two days or 5 pounds over three days. Get to or maintain a healthy weight; ask your heart failure team for referrals to a registered dietitian if needed. Avoid alcohol. Be active (check with your physician or cardiologist first). Find healthy ways to deal with stress, such as deep breathing, meditation, exercise, and doing hobbies that you enjoy. If you smoke, quit. (A resource to help you stop smoking is the [facility] – phone number [phone].). Principal Discharge DX:	LVAD (left ventricular assist device) present  Goal:	full recovery after LVAD implant  Instructions for follow-up, activity and diet:	1. Sponge bath daily   2. Weight yourself daily and notify any weight gain greater than 2-3 pounds in 24 hours.  3. Regular diet - low fat, low cholesterol, no added salt.  4. Cleanse Midsternal incision and leg incision daily with warm water and mild soap, pat dry and maintain open to air.   5. Follow Cardiac Surgery Do's and Don'ts discharge instructions.   6. No driving until cleared by MD.   7. No heavy lifting nothing greater than 5 pounds until cleared by MD.   8. Call / Notify MD any fever greater than 101.0  9. Increase Activity as tolerated.  10. Daily PT   11. Blood draw CBC and SMA7 twice a week every Monday and Thursday and PRN   12. Change Drive line dressing weekly every Monday and PRN using sterile technic.  13. Please have your PT/INR and LDH drawn on Thursday 7/20/17 and then weekly. After discharged from rehab, please have all blood work fax to eNva Chand NP and follow for your coumadin dosage.  Secondary Diagnosis:	CHF (congestive heart failure), NYHA class IV, unspecified failure chronicity, unspecified type  Goal:	You will not be short of breath.  Instructions for follow-up, activity and diet:	Take your medications as prescribed. Follow a low-salt, low salt, low cholesterol heart healthy diet. Weigh yourself every day and keep a record; call your doctor if you gain 2 pounds over one to two days or 5 pounds over three days. Get to or maintain a healthy weight; ask your heart failure team for referrals to a registered dietitian if needed. Avoid alcohol. Be active (check with your physician or cardiologist first). Find healthy ways to deal with stress, such as deep breathing, meditation, exercise, and doing hobbies that you enjoy. If you smoke, quit. (A resource to help you stop smoking is the [facility] – phone number [phone].). Principal Discharge DX:	LVAD (left ventricular assist device) present  Goal:	full recovery after LVAD implant  Instructions for follow-up, activity and diet:	1. Sponge bath daily   2. Weight yourself daily and notify any weight gain greater than 2-3 pounds in 24 hours.  3. Regular diet - low fat, low cholesterol, no added salt.  4. Cleanse Midsternal incision and leg incision daily with warm water and mild soap, pat dry and maintain open to air.   5. Follow Cardiac Surgery Do's and Don'ts discharge instructions.   6. No driving until cleared by MD.   7. No heavy lifting nothing greater than 5 pounds until cleared by MD.   8. Call / Notify MD any fever greater than 101.0  9. Increase Activity as tolerated.  10. Daily PT   11. Blood draw CBC and SMA7 twice a week every Monday and Thursday and PRN   12. Change Drive line dressing weekly every Monday and PRN using sterile technic.  13. Please have your PT/INR and LDH drawn on Thursday 7/20/17 and then weekly. After discharged from rehab, please have all blood work fax to Neva Chand NP and follow for your coumadin dosage.  Secondary Diagnosis:	CHF (congestive heart failure), NYHA class IV, unspecified failure chronicity, unspecified type  Goal:	You will not be short of breath.  Instructions for follow-up, activity and diet:	Take your medications as prescribed. Follow a low-salt, low salt, low cholesterol heart healthy diet. Weigh yourself every day and keep a record; call your doctor if you gain 2 pounds over one to two days or 5 pounds over three days. Get to or maintain a healthy weight; ask your heart failure team for referrals to a registered dietitian if needed. Avoid alcohol. Be active (check with your physician or cardiologist first). Find healthy ways to deal with stress, such as deep breathing, meditation, exercise, and doing hobbies that you enjoy. If you smoke, quit. (A resource to help you stop smoking is the [facility] – phone number [phone].).

## 2017-07-12 NOTE — PROGRESS NOTE ADULT - SUBJECTIVE AND OBJECTIVE BOX
VITAL SIGNS    Telemetry:  sr   13 beast [facility]   Vital Signs Last 24 Hrs  T(C): 36.7 (07-12-17 @ 09:00), Max: 36.8 (07-11-17 @ 16:30)  T(F): 98 (07-12-17 @ 09:00), Max: 98.3 (07-11-17 @ 16:30)  HR: 98 (07-12-17 @ 09:00) (91 - 105)  BP: --  RR: 18 (07-12-17 @ 09:00) (17 - 18)  SpO2: 100% (07-12-17 @ 09:00) (97% - 100%)            07-11 @ 07:01  -  07-12 @ 07:00  --------------------------------------------------------  IN: 980 mL / OUT: 1551 mL / NET: -571 mL    07-12 @ 07:01  -  07-12 @ 12:58  --------------------------------------------------------  IN: 180 mL / OUT: 700 mL / NET: -520 mL         Daily   Admit Wt: Drug Dosing Weight  Height (cm): 172.72 (11 Jun 2017 17:13)  Weight (kg): 75 (11 Jun 2017 15:17)  BMI (kg/m2): 25.1 (11 Jun 2017 17:13)  BSA (m2): 1.89 (11 Jun 2017 17:13)         MEDICATIONS  docusate sodium 100 milliGRAM(s) Oral three times a day    PHYSICAL EXAM    Subjective: " I want to walk more"   Neurology: alert and oriented x 3, nonfocal, no gross deficits  CV : lvad humm no JVD  Sternal Wound :  CDI , Stable, healing drive line site CDI  Lungs: CTA, equal chest expansion  Abdomen: soft, nontender, nondistended, positive bowel sounds, last bowel movement 7/12/17  :    voids  Extremities:  trace BLLE edema,  +dp b/l , no calt tenderness b/l , warm and perfused b/l          Physical Therapy Rec:   Home  [  ]   Home w/ PT  [  ]  Rehab  [ x ]

## 2017-07-12 NOTE — PROGRESS NOTE ADULT - ASSESSMENT
ASSESSMENT  1. Nonischemic dilated cardiomyopathy s/p HM2 LVAD on 6/12  2. Acute blood loss anemia sec to GI bleeding S/P gastric APC clipping on July 6  3. Hx of VT 67 year old man with congestive heart failure with severely reduced LV systolic function due to a nonischemic dilated cardiomyopathy s/p HM2 LVAD on 6/12 with post-operative course complicated by  ventricular tachycardia and acute renal failure, which is improved and stable.  Acute blood loss anemia postop requiring multiple PRBC transfusions and 6/27 +guiac- GI following DR. James  pt placed on Protonix gttp  7/3 anticoagulation on Hold as Per CHF team    7/3 EGD: non-bleeding small superficial gastric ulcer,    7/4 capsule placed and retrieved / continue protonix gttp;  7/5   PRBC x 1, NPO   Push enteroscopy today for further evaluation at small intestine  7/6 Small bowel capsule showing blood in proximal small bowel.  Two non-bleeding angiodysplastic lesions in the jejunum.  Treated with argon plasma coagulation (APC).  Clips  were placed.  The pt is undergoing continued education and demonstration for discharge planning.    Episodes of wct, coreg increased 7/11/17 as d/w chf

## 2017-07-12 NOTE — DISCHARGE NOTE ADULT - CONDITION (STATED IN TERMS THAT PERMIT A SPECIFIC MEASURABLE COMPARISON WITH CONDITION ON ADMISSION):
stable for discharge to [facility] 1. Daily Shower  2. Weight yourself daily and notify any weight gain greater than 2-3 pounds in 24 hours.  3. Regular diet - low fat, low cholesterol, no added salt.  4. Cleanse Midsternal incision and leg incision daily while showering with warm water and mild soap, pat dry and maintain open to air.   5. Follow Cardiac Surgery Do's and Don'ts discharge instructions.   6. No driving until cleared by MD.   7. No heavy lifting nothing greater than 5 pounds until cleared by MD.   8. Call / Notify MD any fever greater than 101.0  9. Increase Activity as tolerated. LVAD

## 2017-07-12 NOTE — DISCHARGE NOTE ADULT - HOSPITAL COURSE
67 year old man with congestive heart failure with severely reduced LV systolic function due to a nonischemic dilated cardiomyopathy s/p HM2 LVAD on 6/12 with post-operative course complicated by  ventricular tachycardia and acute renal failure, which is improved and stable.  Acute blood loss anemia postop requiring multiple PRBC transfusions and 6/27 +guiac- GI following DR. James  pt placed on Protonix gttp  7/3 anticoagulation on Hold as Per CHF team    7/3 EGD: non-bleeding small superficial gastric ulcer,    7/4 capsule placed and retrieved / continue protonix gttp;  7/5   PRBC x 1, NPO   Push enteroscopy today for further evaluation at small intestine  7/6 Small bowel capsule showing blood in proximal small bowel.  Two non-bleeding angiodysplastic lesions in the jejunum.  Treated with argon plasma coagulation (APC).  Clips  were placed.  The pt is undergoing continued education and demonstration for discharge planning.    Episodes of wct, coreg increased 7/11/17 as d/w chf  Medically stable for routine discharge 7/13/17 midthoracic CDI, drive line CDI, pt medically stable for discharge to [facility]. 67 year old man with congestive heart failure with severely reduced LV systolic function due to a nonischemic dilated cardiomyopathy s/p HM2 LVAD on 6/12 with post-operative course complicated by  ventricular tachycardia and acute renal failure, which is improved and stable.  Acute blood loss anemia postop requiring multiple PRBC transfusions and 6/27 +guiac- GI following DR. James  pt placed on Protonix gttp  7/3 anticoagulation on Hold as Per CHF team    7/3 EGD: non-bleeding small superficial gastric ulcer,    7/4 capsule placed and retrieved / continue protonix gttp;  7/5   PRBC x 1, NPO   Push enteroscopy today for further evaluation at small intestine  7/6 Small bowel capsule showing blood in proximal small bowel.  Two non-bleeding angiodysplastic lesions in the jejunum.  Treated with argon plasma coagulation (APC).  Clips  were placed.  The pt is undergoing continued education and demonstration for discharge planning.    Episodes of wct, coreg increased 7/11/17 as d/w chf  Medically stable for routine discharge 7/13/17 midthoracic CDI, drive line CDI, pt medically stable for discharge to [facility].   7/17 Discharged to [facility] 67 year old man with congestive heart failure with severely reduced LV systolic function due to a nonischemic dilated cardiomyopathy s/p HM2 LVAD on 6/12 with post-operative course complicated by  ventricular tachycardia and acute renal failure, which is improved and stable.  Acute blood loss anemia postop requiring multiple PRBC transfusions and 6/27 +guiac- GI following Dr. James pt placed on Protonix gttp  7/3 anticoagulation on Hold as Per CHF team    7/3 EGD: non-bleeding small superficial gastric ulcer,    7/4 capsule placed and retrieved / continue protonix gttp;  7/5   PRBC x 1, NPO   Push enteroscopy today for further evaluation at small intestine  7/6 Small bowel capsule showing blood in proximal small bowel.  Two non-bleeding angiodysplastic lesions in the jejunum.  Treated with argon plasma coagulation (APC).  Clips  were placed.  The pt is undergoing continued education and demonstration for discharge planning.    Episodes of wct, coreg increased 7/11/17 as d/w CHF team.  Patient under going continued education and demonstration for discharge planning.    7/13/17 35 Beat WCT; EP NP consult called to interrogate ICD  7/14 EP Consult (Dr. Cole) --> Started on Mexiletine 150 mg PO BID; WCT improved   7/15 Medically stable midthoracic CDI, drive line CDI, pt medically stable for discharge to [facility]; awaiting facility bed    7/17 Discharged to [facility]

## 2017-07-12 NOTE — DISCHARGE NOTE ADULT - CARE PROVIDER_API CALL
Neva Chand (NP; RN), NP in Adult Health  [address] [place] [address]  Phone: [phone]  Fax: [phone]    Annabelle Mccabe (MD; PhD), Adv Heart Fail Trnsplnt Cardio; Cardiovascular Disease; Internal Medicine  [address] [place] [address]  Phone: [phone]  Fax: [phone] Neva Chand (NP; RN), NP in Adult Health  [address] [place] [address]  Phone: [phone]  Fax: [phone]    Annabelle Mccabe; PhD), Adv Heart Fail Trnsplnt Cardio; Cardiovascular Disease; Internal Medicine  [address] [place] [address]  Phone: [phone]  Fax: [phone]    Ayaz Barr), Surgery; Thoracic and Cardiac Surgery  [address] [place] [address]  Phone: [phone]  Fax: [phone]

## 2017-07-12 NOTE — PROGRESS NOTE ADULT - SUBJECTIVE AND OBJECTIVE BOX
Presenting today with elevated Cretinine    docusate sodium 100 milliGRAM(s) Oral three times a day  amiodarone    Tablet 200 milliGRAM(s) Oral daily  colchicine 0.6 milliGRAM(s) Oral daily  QUEtiapine 50 milliGRAM(s) Oral at bedtime  furosemide    Tablet 20 milliGRAM(s) Oral two times a day  pantoprazole    Tablet 40 milliGRAM(s) Oral before breakfast  spironolactone 12.5 milliGRAM(s) Oral daily  acetaminophen   Tablet. 650 milliGRAM(s) Oral every 6 hours PRN  carvedilol 6.25 milliGRAM(s) Oral every 12 hours        REVIEW OF SYSTEMS:  Vital Signs Last 24 Hrs  T(C): 36.7 (12 Jul 2017 05:00), Max: 37.1 (11 Jul 2017 07:36)  T(F): 98 (12 Jul 2017 05:00), Max: 98.7 (11 Jul 2017 07:36)  HR: 91 (12 Jul 2017 05:00) (82 - 105)  BP: --  BP(mean): 90 (12 Jul 2017 05:00) (74 - 92)  RR: 17 (12 Jul 2017 05:00) (16 - 18)  SpO2: 97% (12 Jul 2017 05:00) (97% - 100%)    PHYSICAL EXAM:  General: A/ox 3, No acute Distress  Neck: Supple,  JVD 6  Cardiac: S1 S2, No M/R/G  Pulmonary: CTAB, Breathing unlabored, No Rhonchi/Rales/Wheezing  Abdomen: Soft, Non -tender, +BS   Extremities: No Rashes, No edema  Neuro: A/o x 3, No focal deficits  Psch: normal mood , normal affect    LABS:  cret                        8.3    12.7  )-----------( 351      ( 12 Jul 2017 04:59 )             25.0     07-12    138  |  103  |  22  ----------------------------<  99  4.2   |  24  |  1.32<H>    Ca    8.4      12 Jul 2017 04:59    TPro  6.3  /  Alb  2.9<L>  /  TBili  0.8  /  DBili  x   /  AST  50<H>  /  ALT  40  /  AlkPhos  172<H>  07-12    PT/INR - ( 12 Jul 2017 04:59 )   PT: 28.2 sec;   INR: 2.54 ratio              I&O's Detail    11 Jul 2017 07:01  -  12 Jul 2017 07:00  --------------------------------------------------------  IN:    Oral Fluid: 980 mL  Total IN: 980 mL    OUT:    Voided: 1551 mL  Total OUT: 1551 mL    Total NET: -571 mL      LVAD Interrogation:  Pump Flow:  Pump Speed:  Pulse Index:  Pump Power:  VAD Events:   Driveline evaluation:    Programming Changes: [ ] No changes made [ ] Changes made: Presenting today sitting in chair pt anxious to go home     docusate sodium 100 milliGRAM(s) Oral three times a day  amiodarone    Tablet 200 milliGRAM(s) Oral daily  colchicine 0.6 milliGRAM(s) Oral daily  QUEtiapine 50 milliGRAM(s) Oral at bedtime  furosemide    Tablet 20 milliGRAM(s) Oral two times a day  pantoprazole    Tablet 40 milliGRAM(s) Oral before breakfast  spironolactone 12.5 milliGRAM(s) Oral daily  acetaminophen   Tablet. 650 milliGRAM(s) Oral every 6 hours PRN  carvedilol 6.25 milliGRAM(s) Oral every 12 hours    Tele:SR T 90- 110 freq PVC with wct 13 beats     REVIEW OF SYSTEMS:  Vital Signs Last 24 Hrs  T(C): 36.7 (12 Jul 2017 05:00), Max: 37.1 (11 Jul 2017 07:36)  T(F): 98 (12 Jul 2017 05:00), Max: 98.7 (11 Jul 2017 07:36)  HR: 91 (12 Jul 2017 05:00) (82 - 105)  BP: --  BP(mean): 90 (12 Jul 2017 05:00) (74 - 92)  RR: 17 (12 Jul 2017 05:00) (16 - 18)  SpO2: 97% (12 Jul 2017 05:00) (97% - 100%)    PHYSICAL EXAM:  General: A/ox 3, No acute Distress  Neck: Supple,  JVD 6  Cardiac: S1 S2, No M/R/G  Pulmonary: CTAB, Breathing unlabored, No Rhonchi/Rales/Wheezing  Abdomen: Soft, Non -tender, +BS   Extremities: No Rashes, No edema  Neuro: A/o x 3, No focal deficits  Psch: normal mood , normal affect    LABS:  cret                        8.3    12.7  )-----------( 351      ( 12 Jul 2017 04:59 )             25.0     07-12    138  |  103  |  22  ----------------------------<  99  4.2   |  24  |  1.32<H>    Ca    8.4      12 Jul 2017 04:59    TPro  6.3  /  Alb  2.9<L>  /  TBili  0.8  /  DBili  x   /  AST  50<H>  /  ALT  40  /  AlkPhos  172<H>  07-12    PT/INR - ( 12 Jul 2017 04:59 )   PT: 28.2 sec;   INR: 2.54 ratio         LDH      I&O's Detail    11 Jul 2017 07:01  -  12 Jul 2017 07:00  --------------------------------------------------------  IN:    Oral Fluid: 980 mL  Total IN: 980 mL    OUT:    Voided: 1551 mL  Total OUT: 1551 mL    Total NET: -571 mL      LVAD Interrogation:  Pump Flow:4.4  Pump Speed: 9200  Pulse Index: 5.9  Pump Power:5.1  VAD Events: no events  Driveline evaluation:    Programming Changes: [ ] No changes made [ ] Changes made: Presenting today sitting in chair pt anxious to go home     docusate sodium 100 milliGRAM(s) Oral three times a day  amiodarone    Tablet 200 milliGRAM(s) Oral daily  colchicine 0.6 milliGRAM(s) Oral daily  QUEtiapine 50 milliGRAM(s) Oral at bedtime  furosemide    Tablet 20 milliGRAM(s) Oral two times a day  pantoprazole    Tablet 40 milliGRAM(s) Oral before breakfast  spironolactone 12.5 milliGRAM(s) Oral daily  acetaminophen   Tablet. 650 milliGRAM(s) Oral every 6 hours PRN  carvedilol 6.25 milliGRAM(s) Oral every 12 hours    Tele:SR T 90- 110 freq PVC with wct 13 beats     REVIEW OF SYSTEMS:  Vital Signs Last 24 Hrs  T(C): 36.7 (12 Jul 2017 05:00), Max: 37.1 (11 Jul 2017 07:36)  T(F): 98 (12 Jul 2017 05:00), Max: 98.7 (11 Jul 2017 07:36)  HR: 91 (12 Jul 2017 05:00) (82 - 105)  BP: --  BP(mean): 90 (12 Jul 2017 05:00) (74 - 92)  RR: 17 (12 Jul 2017 05:00) (16 - 18)  SpO2: 97% (12 Jul 2017 05:00) (97% - 100%)    PHYSICAL EXAM:  General: A/ox 3, No acute Distress  Neck: Supple,  JVD 6  Cardiac: S1 S2, No M/R/G  Pulmonary: CTAB, Breathing unlabored, No Rhonchi/Rales/Wheezing  Abdomen: Soft, Non -tender, +BS   Extremities: No Rashes, No edema  Neuro: A/o x 3, No focal deficits  Psch: normal mood , normal affect    LABS:  cret                        8.3    12.7  )-----------( 351      ( 12 Jul 2017 04:59 )             25.0     07-12    138  |  103  |  22  ----------------------------<  99  4.2   |  24  |  1.32<H>    Ca    8.4      12 Jul 2017 04:59    TPro  6.3  /  Alb  2.9<L>  /  TBili  0.8  /  DBili  x   /  AST  50<H>  /  ALT  40  /  AlkPhos  172<H>  07-12    PT/INR - ( 12 Jul 2017 04:59 )   PT: 28.2 sec;   INR: 2.54 ratio         LDH :287 ( 284)     I&O's Detail    11 Jul 2017 07:01  -  12 Jul 2017 07:00  --------------------------------------------------------  IN:    Oral Fluid: 980 mL  Total IN: 980 mL    OUT:    Voided: 1551 mL  Total OUT: 1551 mL    Total NET: -571 mL      LVAD Interrogation:  Pump Flow:4.4  Pump Speed: 9200  Pulse Index: 5.9  Pump Power:5.1  VAD Events: no events  Driveline evaluation:    Programming Changes: [ ] No changes made [ ] Changes made:

## 2017-07-12 NOTE — PROGRESS NOTE ADULT - PROBLEM SELECTOR PLAN 2
Coumadin resumed/ aspirin on hold  Continue to monitor INR  H/H stable Coumadin resumed tolerating dose     Continue to monitor INR  H/H stable

## 2017-07-12 NOTE — DISCHARGE NOTE ADULT - NS AS ACTIVITY OBS
Walking-Indoors allowed/No Heavy lifting/straining/Walking-Outdoors allowed/Do not drive or operate machinery/Stairs allowed

## 2017-07-12 NOTE — DISCHARGE NOTE ADULT - PLAN OF CARE
full recovery after LVAD implant 1. Daily Shower  2. Weight yourself daily and notify any weight gain greater than 2-3 pounds in 24 hours.  3. Regular diet - low fat, low cholesterol, no added salt.  4. Cleanse Midsternal incision and leg incision daily while showering with warm water and mild soap, pat dry and maintain open to air.   5. Follow Cardiac Surgery Do's and Don'ts discharge instructions.   6. No driving until cleared by MD.   7. No heavy lifting nothing greater than 5 pounds until cleared by MD.   8. Call / Notify MD any fever greater than 101.0  9. Increase Activity as tolerated. You will not be short of breath. Take your medications as prescribed. Follow a low-salt, low salt, low cholesterol heart healthy diet. Weigh yourself every day and keep a record; call your doctor if you gain 2 pounds over one to two days or 5 pounds over three days. Get to or maintain a healthy weight; ask your heart failure team for referrals to a registered dietitian if needed. Avoid alcohol. Be active (check with your physician or cardiologist first). Find healthy ways to deal with stress, such as deep breathing, meditation, exercise, and doing hobbies that you enjoy. If you smoke, quit. (A resource to help you stop smoking is the [facility] – phone number [phone].). 1. Sponge bath daily   2. Weight yourself daily and notify any weight gain greater than 2-3 pounds in 24 hours.  3. Regular diet - low fat, low cholesterol, no added salt.  4. Cleanse Midsternal incision and leg incision daily with warm water and mild soap, pat dry and maintain open to air.   5. Follow Cardiac Surgery Do's and Don'ts discharge instructions.   6. No driving until cleared by MD.   7. No heavy lifting nothing greater than 5 pounds until cleared by MD.   8. Call / Notify MD any fever greater than 101.0  9. Increase Activity as tolerated.  10. Daily PT   11. Blood draw CBC and SMA7 twice a week every Monday and Thursday and PRN   12. Change Drive line dressing daily using sterile technic.  13. Please have your PT/INR levels checked weekly after discharged from rehab, please have all blood work fax to Neva Chand NP and follow for your coumadin dosage. 1. Sponge bath daily   2. Weight yourself daily and notify any weight gain greater than 2-3 pounds in 24 hours.  3. Regular diet - low fat, low cholesterol, no added salt.  4. Cleanse Midsternal incision and leg incision daily with warm water and mild soap, pat dry and maintain open to air.   5. Follow Cardiac Surgery Do's and Don'ts discharge instructions.   6. No driving until cleared by MD.   7. No heavy lifting nothing greater than 5 pounds until cleared by MD.   8. Call / Notify MD any fever greater than 101.0  9. Increase Activity as tolerated.  10. Daily PT   11. Blood draw CBC and SMA7 twice a week every Monday and Thursday and PRN   12. Change Drive line dressing weekly every Monday and PRN using sterile technic.  13. Please have your PT/INR drawn on Thursday 7/20/17 and then weekly. After discharged from rehab, please have all blood work fax to Neva Chand NP and follow for your coumadin dosage. 1. Sponge bath daily   2. Weight yourself daily and notify any weight gain greater than 2-3 pounds in 24 hours.  3. Regular diet - low fat, low cholesterol, no added salt.  4. Cleanse Midsternal incision and leg incision daily with warm water and mild soap, pat dry and maintain open to air.   5. Follow Cardiac Surgery Do's and Don'ts discharge instructions.   6. No driving until cleared by MD.   7. No heavy lifting nothing greater than 5 pounds until cleared by MD.   8. Call / Notify MD any fever greater than 101.0  9. Increase Activity as tolerated.  10. Daily PT   11. Blood draw CBC and SMA7 twice a week every Monday and Thursday and PRN   12. Change Drive line dressing weekly every Monday and PRN using sterile technic.  13. Please have your PT/INR and LDH drawn on Thursday 7/20/17 and then weekly. After discharged from rehab, please have all blood work fax to Neva Chand NP and follow for your coumadin dosage.

## 2017-07-12 NOTE — DISCHARGE NOTE ADULT - OTHER SIGNIFICANT FINDINGS
General: WN/WD NAD  Neurology: A&Ox3, nonfocal, RUVALCABA x 4  Head:  Normocephalic, atraumatic  ENT:  Mucosa moist, no ulcerations  Neck:  Supple, no sinuses or palpable masses  Lymphatic:  No palpable cervical, supraclavicular, axillary or inguinal adenopathy  Respiratory: CTA B/L  CV: (+) LVAD Hum   Abdominal: Soft, NT, ND no palpable mass  MSK: No edema, + peripheral pulses, FROM all 4 extremity  Incisions: MSI C/D/I, no erythema or drainage; driveline site C/D/I

## 2017-07-12 NOTE — DISCHARGE NOTE ADULT - MEDICATION SUMMARY - MEDICATIONS TO STOP TAKING
I will STOP taking the medications listed below when I get home from the hospital:    mexiletine 150 mg oral capsule  -- 1 cap(s) by mouth 3 times a day    rivaroxaban 20 mg oral tablet  -- 1 tab(s) by mouth once a day    senna oral tablet  -- 2 tab(s) by mouth once a day (at bedtime)    Centrum oral tablet  -- 1 tab(s) by mouth once a day    Oyster Shell Calcium with Vitamin D 500 mg-200 intl units oral tablet  -- 1 tab(s) by mouth once a day    metoprolol succinate 25 mg oral tablet, extended release  -- 1 tab(s) by mouth once a day    torsemide 20 mg oral tablet  -- 3 tab(s) by mouth every 12 hours    milrinone  -- 0.3 mcg/kg/min intravenous

## 2017-07-12 NOTE — DISCHARGE NOTE ADULT - MEDICATION SUMMARY - MEDICATIONS TO TAKE
I will START or STAY ON the medications listed below when I get home from the hospital:    spironolactone 25 mg oral tablet  -- 0.5 tab(s) by mouth once a day  -- Indication: For Diuretic    acetaminophen 325 mg oral tablet  -- 2 tab(s) by mouth every 6 hours, As needed, Mild Pain (1 - 3)  -- Indication: For Pain     amiodarone 200 mg oral tablet  -- 1 tab(s) by mouth once a day  -- Indication: For Antiarrhtymic     mexiletine 150 mg oral capsule  -- 1 cap(s) by mouth 2 times a day  -- Indication: For Antiarrhythmic     warfarin 3 mg oral tablet  -- 1 tab(s) by mouth once a day (at bedtime)  -- Indication: For LVAD (left ventricular assist device) present    diphenhydrAMINE 25 mg oral capsule  -- 1 cap(s) by mouth every 4 hours, As needed, Rash and/or Itching  -- Indication: For Rash / itching     colchicine 0.6 mg oral tablet  -- 1 tab(s) by mouth once a day  -- Indication: For Gout     QUEtiapine 50 mg oral tablet  -- 1 tab(s) by mouth once a day (at bedtime)  -- Indication: For Depression     carvedilol 6.25 mg oral tablet  -- 1 tab(s) by mouth every 12 hours  -- Indication: For Blood pressure and heart rate control     hydrocortisone 1% topical cream  -- 1 application on skin 3 times a day  -- Indication: For To affected area rash     furosemide 20 mg oral tablet  -- 1 tab(s) by mouth once a day  -- Indication: For Diuretic     docusate sodium 100 mg oral capsule  -- 1 cap(s) by mouth 3 times a day  -- Indication: For Stool Softner     pantoprazole 40 mg oral delayed release tablet  -- 1 tab(s) by mouth once a day (before a meal)  -- Indication: For Acid Reflux     folic acid 1 mg oral tablet  -- 1 tab(s) by mouth once a day  -- Indication: For Vitamin Supplement     Vitamin C 500 mg oral tablet  -- 1 tab(s) by mouth once a day  -- Indication: For Vitamin Supplement

## 2017-07-12 NOTE — PROGRESS NOTE ADULT - PROBLEM SELECTOR PLAN 1
continue present medication regime   decrease lasix to once daily   LVAD teaching ongoing  d/c planning for possible rehab tomorrow

## 2017-07-12 NOTE — PROGRESS NOTE ADULT - ASSESSMENT
ASSESSMENT  1. Nonischemic dilated cardiomyopathy s/p HM2 LVAD on 6/12  2. Acute blood loss anemia sec to GI bleeding S/P gastric APC clipping on July 6  3. Hx of VT ASSESSMENT  1. Nonischemic dilated cardiomyopathy s/p HM2 LVAD on 6/12  2. Acute blood loss anemia sec to GI bleeding S/P gastric APC clipping on July 6  3. Hx of VT  4 on going LVAD teaching and support   Pt today feeling well  cret slightly increased  from 1.1 to 1.3 appears euvolemic no JVD edema , no episodes of bleeding with normal BM  Pt ready for rehab

## 2017-07-12 NOTE — DISCHARGE NOTE ADULT - CARE PROVIDERS DIRECT ADDRESSES
,DirectAddress_Unknown,[email or web address] ,DirectAddress_Unknown,[email or web address]

## 2017-07-13 LAB
ANION GAP SERPL CALC-SCNC: 12 MMOL/L — SIGNIFICANT CHANGE UP (ref 5–17)
BUN SERPL-MCNC: 22 MG/DL — SIGNIFICANT CHANGE UP (ref 7–23)
CALCIUM SERPL-MCNC: 8.6 MG/DL — SIGNIFICANT CHANGE UP (ref 8.4–10.5)
CHLORIDE SERPL-SCNC: 105 MMOL/L — SIGNIFICANT CHANGE UP (ref 96–108)
CO2 SERPL-SCNC: 22 MMOL/L — SIGNIFICANT CHANGE UP (ref 22–31)
CREAT SERPL-MCNC: 1.19 MG/DL — SIGNIFICANT CHANGE UP (ref 0.5–1.3)
GLUCOSE SERPL-MCNC: 93 MG/DL — SIGNIFICANT CHANGE UP (ref 70–99)
HCT VFR BLD CALC: 25.7 % — LOW (ref 39–50)
HGB BLD-MCNC: 8.4 G/DL — LOW (ref 13–17)
INR BLD: 2.22 RATIO — HIGH (ref 0.88–1.16)
LDH SERPL L TO P-CCNC: 246 U/L — HIGH (ref 50–242)
MCHC RBC-ENTMCNC: 30.8 PG — SIGNIFICANT CHANGE UP (ref 27–34)
MCHC RBC-ENTMCNC: 32.6 GM/DL — SIGNIFICANT CHANGE UP (ref 32–36)
MCV RBC AUTO: 94.3 FL — SIGNIFICANT CHANGE UP (ref 80–100)
PLATELET # BLD AUTO: 391 K/UL — SIGNIFICANT CHANGE UP (ref 150–400)
POTASSIUM SERPL-MCNC: 4.3 MMOL/L — SIGNIFICANT CHANGE UP (ref 3.5–5.3)
POTASSIUM SERPL-SCNC: 4.3 MMOL/L — SIGNIFICANT CHANGE UP (ref 3.5–5.3)
PROTHROM AB SERPL-ACNC: 24.6 SEC — HIGH (ref 9.8–12.7)
RBC # BLD: 2.72 M/UL — LOW (ref 4.2–5.8)
RBC # FLD: 17.1 % — HIGH (ref 10.3–14.5)
SODIUM SERPL-SCNC: 139 MMOL/L — SIGNIFICANT CHANGE UP (ref 135–145)
WBC # BLD: 13.4 K/UL — HIGH (ref 3.8–10.5)
WBC # FLD AUTO: 13.4 K/UL — HIGH (ref 3.8–10.5)

## 2017-07-13 PROCEDURE — 93010 ELECTROCARDIOGRAM REPORT: CPT

## 2017-07-13 PROCEDURE — 99233 SBSQ HOSP IP/OBS HIGH 50: CPT

## 2017-07-13 RX ORDER — WARFARIN SODIUM 2.5 MG/1
3 TABLET ORAL ONCE
Qty: 0 | Refills: 0 | Status: DISCONTINUED | OUTPATIENT
Start: 2017-07-13 | End: 2017-07-13

## 2017-07-13 RX ORDER — WARFARIN SODIUM 2.5 MG/1
2.5 TABLET ORAL ONCE
Qty: 0 | Refills: 0 | Status: COMPLETED | OUTPATIENT
Start: 2017-07-13 | End: 2017-07-13

## 2017-07-13 RX ADMIN — Medication 20 MILLIGRAM(S): at 05:54

## 2017-07-13 RX ADMIN — PANTOPRAZOLE SODIUM 40 MILLIGRAM(S): 20 TABLET, DELAYED RELEASE ORAL at 05:53

## 2017-07-13 RX ADMIN — Medication 0.6 MILLIGRAM(S): at 10:07

## 2017-07-13 RX ADMIN — Medication 100 MILLIGRAM(S): at 14:02

## 2017-07-13 RX ADMIN — Medication 100 MILLIGRAM(S): at 05:54

## 2017-07-13 RX ADMIN — Medication 100 MILLIGRAM(S): at 22:25

## 2017-07-13 RX ADMIN — Medication 650 MILLIGRAM(S): at 15:00

## 2017-07-13 RX ADMIN — SPIRONOLACTONE 12.5 MILLIGRAM(S): 25 TABLET, FILM COATED ORAL at 05:54

## 2017-07-13 RX ADMIN — Medication 650 MILLIGRAM(S): at 14:02

## 2017-07-13 RX ADMIN — AMIODARONE HYDROCHLORIDE 200 MILLIGRAM(S): 400 TABLET ORAL at 05:53

## 2017-07-13 RX ADMIN — CARVEDILOL PHOSPHATE 6.25 MILLIGRAM(S): 80 CAPSULE, EXTENDED RELEASE ORAL at 05:53

## 2017-07-13 RX ADMIN — WARFARIN SODIUM 2.5 MILLIGRAM(S): 2.5 TABLET ORAL at 22:24

## 2017-07-13 RX ADMIN — QUETIAPINE FUMARATE 50 MILLIGRAM(S): 200 TABLET, FILM COATED ORAL at 22:25

## 2017-07-13 RX ADMIN — CARVEDILOL PHOSPHATE 6.25 MILLIGRAM(S): 80 CAPSULE, EXTENDED RELEASE ORAL at 17:42

## 2017-07-13 NOTE — PROGRESS NOTE ADULT - PROBLEM SELECTOR PLAN 1
continue present medications for discharge   LVAD teaching completed and successful with support of family in place   d/c planning for rehab today  daily weights , fluid intake and dietary d/w pt   pt to follow up with in 5- 7 days with Heart Failure team

## 2017-07-13 NOTE — PROGRESS NOTE ADULT - ASSESSMENT
ASSESSMENT  1. Nonischemic dilated cardiomyopathy s/p HM2 LVAD on 6/12  2. Acute blood loss anemia sec to GI bleeding S/P gastric APC clipping on July 6  3. Hx of VT  4 on going LVAD teaching and support   Pt today feeling well  cret slightly increased  from 1.1 to 1.3 appears euvolemic no JVD edema , no episodes of bleeding with normal BM  Pt ready for rehab

## 2017-07-13 NOTE — PROCEDURE NOTE - INTERROGATION NOTE: COMMENTS
call by Samantha BRAVO to check patient with VT on telemetry last night rate 130's-140's
Battery Status 3.8
Battery longevity 3.8 years
Battery longevity 3.8 years

## 2017-07-13 NOTE — PROCEDURE NOTE - PROCEDURE DATE TIME, MLM
13-Jul-2017 19:31
13-Jun-2017 16:50
17-Jun-2017 11:00
17-Jun-2017 12:00
12-Jun-2017 08:03
14-Jun-2017 12:46
12-Jun-2017 13:33

## 2017-07-13 NOTE — PROGRESS NOTE ADULT - SUBJECTIVE AND OBJECTIVE BOX
VITAL SIGNS    Telemetry:  SR  with 35 beats [facility]  Vital Signs Last 24 Hrs  T(C): 36.7 (17 @ 09:30), Max: 37 (17 @ 21:55)  T(F): 98 (17 @ 09:30), Max: 98.6 (17 @ 21:55)  HR: 96 (17 @ 09:30) (80 - 102)  BP: --  RR: 18 (17 @ 09:30) (16 - 18)  SpO2: 100% (17 @ 09:30) (99% - 100%)             @ 07:01  -   @ 07:00  --------------------------------------------------------  IN: 1100 mL / OUT: 2300 mL / NET: -1200 mL     @ 07:01  -   @ 13:50  --------------------------------------------------------  IN: 0 mL / OUT: 400 mL / NET: -400 mL         Daily Weight in k.4 (2017 08:00)  Admit Wt: Drug Dosing Weight  Weight (kg): 75 (2017 15:17)      MEDICATIONS  docusate sodium 100 milliGRAM(s) Oral three times a day  amiodarone    Tablet 200 milliGRAM(s) Oral daily  colchicine 0.6 milliGRAM(s) Oral daily  QUEtiapine 50 milliGRAM(s) Oral at bedtime  pantoprazole    Tablet 40 milliGRAM(s) Oral before breakfast  spironolactone 12.5 milliGRAM(s) Oral daily  acetaminophen   Tablet. 650 milliGRAM(s) Oral every 6 hours PRN  carvedilol 6.25 milliGRAM(s) Oral every 12 hours  furosemide    Tablet 20 milliGRAM(s) Oral daily  warfarin 2.5 milliGRAM(s) Oral once      PHYSICAL EXAM    Subjective: " I am upset my discharge for post poned"  Neurology: alert and oriented x 3, nonfocal, no gross deficits  CV : s1s1 reg no MRGS, no JVD  Sternal Wound :  CDI , Stable, drive line site CDI  Lungs: CTA, equal chest expansion  Abdomen: soft, nontender, nondistended, positive bowel sounds, last bowel movement 17  :    voids  Extremities:   edema,  +dp b/l , no calt tenderness b/l , warm and perfused b/l    LABS      139  |  105  |  22  ----------------------------<  93  4.3   |  22  |  1.19    Ca    8.6      2017 05:53    TPro  6.3  /  Alb  2.9<L>  /  TBili  0.8  /  DBili  x   /  AST  50<H>  /  ALT  40  /  AlkPhos  172<H>                                   8.4    13.4  )-----------( 391      ( 2017 05:53 )             25.7          PT/INR - ( 2017 05:53 )   PT: 24.6 sec;   INR: 2.22 ratio             PAST MEDICAL & SURGICAL HISTORY:  Ventricular fibrillation: s/p AICD  PAF (paroxysmal atrial fibrillation): on xarelto  Non-Ischemic Cardiomyopathy  SVT (Supraventricular Tachycardia)  HTN  CHF (Congestive Heart Failure)  Status post left hip replacement  History of Prior Ablation Treatment: for afib  AICD (Automatic Cardioverter/Defibrillator) Present: [employer] with 1 [employer] lead09- explanted and replaced with [employer] 2 leads on 09       Physical Therapy Rec:   Home  [  ]   Home w/ PT  [  ]  Rehab  [ x ]

## 2017-07-13 NOTE — PROGRESS NOTE ADULT - ASSESSMENT
67 year old man with congestive heart failure with severely reduced LV systolic function due to a nonischemic dilated cardiomyopathy s/p HM2 LVAD on 6/12 with post-operative course complicated by  ventricular tachycardia and acute renal failure, which is improved and stable.  Acute blood loss anemia postop requiring multiple PRBC transfusions and 6/27 +guiac- GI following DR. James  pt placed on Protonix gttp  7/3 anticoagulation on Hold as Per CHF team    7/3 EGD: non-bleeding small superficial gastric ulcer,    7/4 capsule placed and retrieved / continue protonix gttp;  7/5   PRBC x 1, NPO   Push enteroscopy today for further evaluation at small intestine  7/6 Small bowel capsule showing blood in proximal small bowel.  Two non-bleeding angiodysplastic lesions in the jejunum.  Treated with argon plasma coagulation (APC).  Clips  were placed.  The pt is undergoing continued education and demonstration for discharge planning.    Episodes of wct, coreg increased 7/11/17 as d/w chf  7/13/17 35 beast [facility] eps called

## 2017-07-13 NOTE — PROGRESS NOTE ADULT - PROBLEM SELECTOR PLAN 2
Coumadin daily for home   Continue to monitor INR as outpt   H/H stable for d/c   s&s of bleeding reviewed with pt

## 2017-07-13 NOTE — PROCEDURE NOTE - NSINFORMCONSENT_GEN_A_CORE
Benefits, risks, and possible complications of procedure explained to patient/caregiver who verbalized understanding and gave verbal consent.
This was an emergent procedure.
Benefits, risks, and possible complications of procedure explained to patient/caregiver who verbalized understanding and gave written consent.
Preop surgery/Benefits, risks, and possible complications of procedure explained to patient/caregiver who verbalized understanding and gave verbal consent.

## 2017-07-13 NOTE — PROCEDURE NOTE - ADDITIONAL PROCEDURE DETAILS
noted 9  seconds of WCT no stored EGM's to correlate at that time , AT/AFepisodes from early that AM prior to LVAD procedure.  D/W Dr Stahl heart failure, no changes at this time to ICD settings at this time. Call EPS with any needs
1) Indication for interrogation: r/o Arrythmia  2) Measured data WNL, NL ICD function, Pt is not PM dependent  3) Stored data revealed multiple atrial high episodes between June 14, 2017 to  June 22, 2017 lasting 2 seconds to 3 hours, review of available EGM's c/w AFIB/ A- flutter. Total AT/AF burden 43%. One episode of 16 beats of NSVT on Jul 6, 2017.   4) Changes made: none
ICD reactivated with same preop parameters.
ICD deactivated prior to surgery. Call post op to re-activate ICD.

## 2017-07-13 NOTE — PROGRESS NOTE ADULT - ATTENDING COMMENTS
Ready for discharge today, but bed not ready at Rehab.  On warfarin and remains off ASA. INR 2.2. Continue warfarin 2.5 mg po QHS.  , Hgb stable at 8.4, SCr back to 1.19 on reduced lasix. MAP 80-90, with occasional drops to mid-70s.    Having bursts of WCT and some looks more regular. Will ask for EP interrogation of his device as this may be SVT rather than NSVT.  VAD interrogation not performed as he was ambulating in the hallway.

## 2017-07-13 NOTE — PROGRESS NOTE ADULT - PROBLEM SELECTOR PLAN 1
diuresis as per CHF team  Dose coumadin, hold asa as per HF Team  Discharge to rehab [facility] once bed available

## 2017-07-13 NOTE — PROGRESS NOTE ADULT - SUBJECTIVE AND OBJECTIVE BOX
docusate sodium 100 milliGRAM(s) Oral three times a day  amiodarone    Tablet 200 milliGRAM(s) Oral daily  colchicine 0.6 milliGRAM(s) Oral daily  QUEtiapine 50 milliGRAM(s) Oral at bedtime  pantoprazole    Tablet 40 milliGRAM(s) Oral before breakfast  spironolactone 12.5 milliGRAM(s) Oral daily  acetaminophen   Tablet. 650 milliGRAM(s) Oral every 6 hours PRN  carvedilol 6.25 milliGRAM(s) Oral every 12 hours  furosemide    Tablet 20 milliGRAM(s) Oral daily        REVIEW OF SYSTEMS:  Vital Signs Last 24 Hrs  T(C): 36.8 (2017 05:42), Max: 37 (2017 21:55)  T(F): 98.2 (2017 05:42), Max: 98.6 (2017 21:55)  HR: 99 (2017 05:42) (80 - 102)  BP: 97/71 (2017 13:00) (97/71 - 97/71)  BP(mean): 90 (2017 17:00) (82 - 90)  RR: 18 (2017 05:42) (16 - 18)  SpO2: 100% (2017 05:42) (98% - 100%)    PHYSICAL EXAM:  General: A/ox 3, No acute Distress  Neck: Supple, NO JVD  Cardiac: S1 S2, No M/R/G  Pulmonary: CTAB, Breathing unlabored, No Rhonchi/Rales/Wheezing  Abdomen: Soft, Non -tender, +BS   Extremities: No Rashes, No edema  Neuro: A/o x 3, No focal deficits  Psch: normal mood , normal affect    LABS:  cret                        8.4    13.4  )-----------( 391      ( 2017 05:53 )             25.7     07-13    139  |  105  |  22  ----------------------------<  93  4.3   |  22  |  1.19    Ca    8.6      2017 05:53    TPro  6.3  /  Alb  2.9<L>  /  TBili  0.8  /  DBili  x   /  AST  50<H>  /  ALT  40  /  AlkPhos  172<H>  07-12    PT/INR - ( 2017 05:53 )   PT: 24.6 sec;   INR: 2.22 ratio            docusate sodium 100 milliGRAM(s) Oral three times a day  amiodarone    Tablet 200 milliGRAM(s) Oral daily  colchicine 0.6 milliGRAM(s) Oral daily  QUEtiapine 50 milliGRAM(s) Oral at bedtime  pantoprazole    Tablet 40 milliGRAM(s) Oral before breakfast  spironolactone 12.5 milliGRAM(s) Oral daily  acetaminophen   Tablet. 650 milliGRAM(s) Oral every 6 hours PRN  carvedilol 6.25 milliGRAM(s) Oral every 12 hours  furosemide    Tablet 20 milliGRAM(s) Oral daily        REVIEW OF SYSTEMS:  Vital Signs Last 24 Hrs  T(C): 36.8 (2017 05:42), Max: 37 (2017 21:55)  T(F): 98.2 (2017 05:42), Max: 98.6 (2017 21:55)  HR: 99 (2017 05:42) (80 - 102)  BP: 97/71 (2017 13:00) (97/71 - 97/71)  BP(mean): 90 (2017 17:00) (82 - 90)  RR: 18 (2017 05:42) (16 - 18)  SpO2: 100% (2017 05:42) (98% - 100%)    PHYSICAL EXAM:  General: A/ox 3, No acute Distress  Neck: Supple, NO JVD  Cardiac: S1 S2, No M/R/G  Pulmonary: CTAB, Breathing unlabored, No Rhonchi/Rales/Wheezing  Abdomen: Soft, Non -tender, +BS   Extremities: No Rashes, No edema  Neuro: A/o x 3, No focal deficits  Psch: normal mood , normal affect    LABS:  cret                        8.4    13.4  )-----------( 391      ( 2017 05:53 )             25.7     07    139  |  105  |  22  ----------------------------<  93  4.3   |  22  |  1.19    Ca    8.6      2017 05:53    TPro  6.3  /  Alb  2.9<L>  /  TBili  0.8  /  DBili  x   /  AST  50<H>  /  ALT  40  /  AlkPhos  172<H>  07-12    PT/INR - ( 2017 05:53 )   PT: 24.6 sec;   INR: 2.22 ratio           Daily     Daily Weight in k.6 (2017 15:10)  I&O's Detail    2017 07:01  -  2017 07:00  --------------------------------------------------------  IN:    Oral Fluid: 1100 mL  Total IN: 1100 mL    OUT:    Voided: 2300 mL  Total OUT: 2300 mL    Total NET: -1200 mL            docusate sodium 100 milliGRAM(s) Oral three times a day  amiodarone    Tablet 200 milliGRAM(s) Oral daily  colchicine 0.6 milliGRAM(s) Oral daily  QUEtiapine 50 milliGRAM(s) Oral at bedtime  pantoprazole    Tablet 40 milliGRAM(s) Oral before breakfast  spironolactone 12.5 milliGRAM(s) Oral daily  acetaminophen   Tablet. 650 milliGRAM(s) Oral every 6 hours PRN  carvedilol 6.25 milliGRAM(s) Oral every 12 hours  furosemide    Tablet 20 milliGRAM(s) Oral daily        REVIEW OF SYSTEMS:  Vital Signs Last 24 Hrs  T(C): 36.8 (2017 05:42), Max: 37 (2017 21:55)  T(F): 98.2 (2017 05:42), Max: 98.6 (2017 21:55)  HR: 99 (2017 05:42) (80 - 102)  BP: 97/71 (2017 13:00) (97/71 - 97/71)  BP(mean): 90 (2017 17:00) (82 - 90)  RR: 18 (2017 05:42) (16 - 18)  SpO2: 100% (2017 05:42) (98% - 100%)    PHYSICAL EXAM:  General: A/ox 3, No acute Distress  Neck: Supple, NO JVD  Cardiac: S1 S2, No M/R/G  Pulmonary: CTAB, Breathing unlabored, No Rhonchi/Rales/Wheezing  Abdomen: Soft, Non -tender, +BS   Extremities: No Rashes, No edema  Neuro: A/o x 3, No focal deficits  Psch: normal mood , normal affect    LABS:  cret                        8.4    13.4  )-----------( 391      ( 2017 05:53 )             25.7     07-    139  |  105  |  22  ----------------------------<  93  4.3   |  22  |  1.19    Ca    8.6      2017 05:53    TPro  6.3  /  Alb  2.9<L>  /  TBili  0.8  /  DBili  x   /  AST  50<H>  /  ALT  40  /  AlkPhos  172<H>      PT/INR - ( 2017 05:53 )   PT: 24.6 sec;   INR: 2.22 ratio            LVAD Interrogation:  Pump Flow:  Pump Speed:  Pulse Index:  Pump Power:  VAD Events:   Driveline evaluation:    Programming Changes: [ ] No changes made [ ] Changes made:    Daily     Daily Weight in k.6 (2017 15:10)  I&O's Detail    2017 07:01  -  2017 07:00  --------------------------------------------------------  IN:    Oral Fluid: 1100 mL  Total IN: 1100 mL    OUT:    Voided: 2300 mL  Total OUT: 2300 mL    Total NET: -1200 mL                  docusate sodium 100 milliGRAM(s) Oral three times a day  amiodarone    Tablet 200 milliGRAM(s) Oral daily  colchicine 0.6 milliGRAM(s) Oral daily  QUEtiapine 50 milliGRAM(s) Oral at bedtime  pantoprazole    Tablet 40 milliGRAM(s) Oral before breakfast  spironolactone 12.5 milliGRAM(s) Oral daily  acetaminophen   Tablet. 650 milliGRAM(s) Oral every 6 hours PRN  carvedilol 6.25 milliGRAM(s) Oral every 12 hours  furosemide    Tablet 20 milliGRAM(s) Oral daily        REVIEW OF SYSTEMS:  Vital Signs Last 24 Hrs  T(C): 36.8 (2017 05:42), Max: 37 (2017 21:55)  T(F): 98.2 (2017 05:42), Max: 98.6 (2017 21:55)  HR: 99 (2017 05:42) (80 - 102)  BP: 97/71 (2017 13:00) (97/71 - 97/71)  BP(mean): 90 (2017 17:00) (82 - 90)  RR: 18 (2017 05:42) (16 - 18)  SpO2: 100% (2017 05:42) (98% - 100%)    PHYSICAL EXAM:  General: A/ox 3, No acute Distress  Neck: Supple, NO JVD  Cardiac: S1 S2, No M/R/G  Pulmonary: CTAB, Breathing unlabored, No Rhonchi/Rales/Wheezing  Abdomen: Soft, Non -tender, +BS   Extremities: No Rashes, No edema  Neuro: A/o x 3, No focal deficits  Psch: normal mood , normal affect    LABS:  cret                        8.4    13.4  )-----------( 391      ( 2017 05:53 )             25.7     07-13    139  |  105  |  22  ----------------------------<  93  4.3   |  22  |  1.19    Ca    8.6      2017 05:53    TPro  6.3  /  Alb  2.9<L>  /  TBili  0.8  /  DBili  x   /  AST  50<H>  /  ALT  40  /  AlkPhos  172<H>  07-12    PT/INR - ( 2017 05:53 )   PT: 24.6 sec;   INR: 2.22 ratio           Daily     Daily Weight in k.6 (2017 15:10)  I&O's Detail    2017 07:01  -  2017 07:00  --------------------------------------------------------  IN:    Oral Fluid: 1100 mL  Total IN: 1100 mL    OUT:    Voided: 2300 mL  Total OUT: 2300 mL    Total NET: -1200 mL    Daily     Daily Weight in k.6 (2017 15:10)  I&O's Detail    2017 07:01  -  2017 07:00  --------------------------------------------------------  IN:    Oral Fluid: 1100 mL  Total IN: 1100 mL    OUT:    Voided: 2300 mL  Total OUT: 2300 mL    Total NET: -1200 mL Pt sitting in chair Pt anxious to leave for rehab today No c/o overnight       docusate sodium 100 milliGRAM(s) Oral three times a day  amiodarone    Tablet 200 milliGRAM(s) Oral daily  colchicine 0.6 milliGRAM(s) Oral daily  QUEtiapine 50 milliGRAM(s) Oral at bedtime  pantoprazole    Tablet 40 milliGRAM(s) Oral before breakfast  spironolactone 12.5 milliGRAM(s) Oral daily  acetaminophen   Tablet. 650 milliGRAM(s) Oral every 6 hours PRN  carvedilol 6.25 milliGRAM(s) Oral every 12 hours  furosemide    Tablet 20 milliGRAM(s) Oral daily      Tele: SR-  ST 90 - 110 with Multiple runs WCT 11- 35 rate 150 with no AICD firing or c/o dizziness / LH or CP   Vital Signs Last 24 Hrs  T(C): 36.8 (2017 05:42), Max: 37 (2017 21:55)  T(F): 98.2 (2017 05:42), Max: 98.6 (2017 21:55)  HR: 99 (2017 05:42) (80 - 102)  BP: 97/71 (2017 13:00) (97/71 - 97/71)  BP(mean): 90 (2017 17:00) (82 - 90)  RR: 18 (2017 05:42) (16 - 18)  SpO2: 100% (2017 05:42) (98% - 100%)    PHYSICAL EXAM:  General: A/ox 3, No acute Distress  Neck: Supple,  JVD 6  Cardiac: S1 S2, No M/R/G LVAD Hum sounds   Pulmonary: CTAB, Breathing unlabored, No Rhonchi/Rales/Wheezing  Abdomen: Soft, Non -tender, +BS   Extremities: No Rashes, No edema  Neuro: A/o x 3, No focal deficits  Psch: normal mood , normal affect    LABS:  cret                        8.4    13.4  )-----------( 391      ( 2017 05:53 )             25.7     07-13    139  |  105  |  22  ----------------------------<  93  4.3   |  22  |  1.19    Ca    8.6      2017 05:53    TPro  6.3  /  Alb  2.9<L>  /  TBili  0.8  /  DBili  x   /  AST  50<H>  /  ALT  40  /  AlkPhos  172<H>  07-12    PT/INR - ( 2017 05:53 )   PT: 24.6 sec;   INR: 2.22 ratio             Daily     Daily Weight in k.4 (2017 08:00)  daily Weight in k.6 (2017 15:10)  I&O's Detail    2017 07:01  -  2017 07:00  --------------------------------------------------------  IN:    Oral Fluid: 1100 mL  Total IN: 1100 mL    OUT:    Voided: 2300 mL  Total OUT: 2300 mL    Total NET: -1200 mL       LVAD Interrogation:  Pump Flow:4.9  Pump Speed:9200  Pulse Index:5.7  Pump Power:5.4  VAD Events: zero  Driveline evaluation:    Programming Changes: [x ] No changes made [ ] Changes made:

## 2017-07-14 LAB
ANION GAP SERPL CALC-SCNC: 13 MMOL/L — SIGNIFICANT CHANGE UP (ref 5–17)
BUN SERPL-MCNC: 23 MG/DL — SIGNIFICANT CHANGE UP (ref 7–23)
CALCIUM SERPL-MCNC: 8.7 MG/DL — SIGNIFICANT CHANGE UP (ref 8.4–10.5)
CHLORIDE SERPL-SCNC: 104 MMOL/L — SIGNIFICANT CHANGE UP (ref 96–108)
CO2 SERPL-SCNC: 22 MMOL/L — SIGNIFICANT CHANGE UP (ref 22–31)
CREAT SERPL-MCNC: 1.16 MG/DL — SIGNIFICANT CHANGE UP (ref 0.5–1.3)
GLUCOSE SERPL-MCNC: 114 MG/DL — HIGH (ref 70–99)
HCT VFR BLD CALC: 25.4 % — LOW (ref 39–50)
HGB BLD-MCNC: 8.1 G/DL — LOW (ref 13–17)
INR BLD: 2.26 RATIO — HIGH (ref 0.88–1.16)
LDH SERPL L TO P-CCNC: 251 U/L — HIGH (ref 50–242)
MCHC RBC-ENTMCNC: 30 PG — SIGNIFICANT CHANGE UP (ref 27–34)
MCHC RBC-ENTMCNC: 31.9 GM/DL — LOW (ref 32–36)
MCV RBC AUTO: 93.8 FL — SIGNIFICANT CHANGE UP (ref 80–100)
PLATELET # BLD AUTO: 386 K/UL — SIGNIFICANT CHANGE UP (ref 150–400)
POTASSIUM SERPL-MCNC: 4.2 MMOL/L — SIGNIFICANT CHANGE UP (ref 3.5–5.3)
POTASSIUM SERPL-SCNC: 4.2 MMOL/L — SIGNIFICANT CHANGE UP (ref 3.5–5.3)
PROTHROM AB SERPL-ACNC: 25 SEC — HIGH (ref 9.8–12.7)
RBC # BLD: 2.71 M/UL — LOW (ref 4.2–5.8)
RBC # FLD: 17.4 % — HIGH (ref 10.3–14.5)
SODIUM SERPL-SCNC: 139 MMOL/L — SIGNIFICANT CHANGE UP (ref 135–145)
WBC # BLD: 12.7 K/UL — HIGH (ref 3.8–10.5)
WBC # FLD AUTO: 12.7 K/UL — HIGH (ref 3.8–10.5)

## 2017-07-14 PROCEDURE — 99223 1ST HOSP IP/OBS HIGH 75: CPT | Mod: GC

## 2017-07-14 PROCEDURE — 93750 INTERROGATION VAD IN PERSON: CPT

## 2017-07-14 PROCEDURE — 99233 SBSQ HOSP IP/OBS HIGH 50: CPT | Mod: 25,GC

## 2017-07-14 RX ORDER — MEXILETINE HYDROCHLORIDE 150 MG/1
150 CAPSULE ORAL
Qty: 0 | Refills: 0 | Status: DISCONTINUED | OUTPATIENT
Start: 2017-07-14 | End: 2017-07-17

## 2017-07-14 RX ORDER — WARFARIN SODIUM 2.5 MG/1
2.5 TABLET ORAL ONCE
Qty: 0 | Refills: 0 | Status: COMPLETED | OUTPATIENT
Start: 2017-07-14 | End: 2017-07-14

## 2017-07-14 RX ORDER — DIPHENHYDRAMINE HCL 50 MG
25 CAPSULE ORAL EVERY 4 HOURS
Qty: 0 | Refills: 0 | Status: DISCONTINUED | OUTPATIENT
Start: 2017-07-14 | End: 2017-07-17

## 2017-07-14 RX ORDER — HYDROCORTISONE 1 %
1 OINTMENT (GRAM) TOPICAL THREE TIMES A DAY
Qty: 0 | Refills: 0 | Status: DISCONTINUED | OUTPATIENT
Start: 2017-07-14 | End: 2017-07-17

## 2017-07-14 RX ADMIN — Medication 100 MILLIGRAM(S): at 05:50

## 2017-07-14 RX ADMIN — Medication 100 MILLIGRAM(S): at 22:21

## 2017-07-14 RX ADMIN — Medication 1 APPLICATION(S): at 22:21

## 2017-07-14 RX ADMIN — PANTOPRAZOLE SODIUM 40 MILLIGRAM(S): 20 TABLET, DELAYED RELEASE ORAL at 06:01

## 2017-07-14 RX ADMIN — MEXILETINE HYDROCHLORIDE 150 MILLIGRAM(S): 150 CAPSULE ORAL at 17:13

## 2017-07-14 RX ADMIN — Medication 0.6 MILLIGRAM(S): at 11:06

## 2017-07-14 RX ADMIN — Medication 25 MILLIGRAM(S): at 22:21

## 2017-07-14 RX ADMIN — Medication 650 MILLIGRAM(S): at 05:57

## 2017-07-14 RX ADMIN — Medication 1 APPLICATION(S): at 17:14

## 2017-07-14 RX ADMIN — Medication 650 MILLIGRAM(S): at 23:06

## 2017-07-14 RX ADMIN — SPIRONOLACTONE 12.5 MILLIGRAM(S): 25 TABLET, FILM COATED ORAL at 05:50

## 2017-07-14 RX ADMIN — Medication 20 MILLIGRAM(S): at 05:51

## 2017-07-14 RX ADMIN — Medication 100 MILLIGRAM(S): at 13:47

## 2017-07-14 RX ADMIN — CARVEDILOL PHOSPHATE 6.25 MILLIGRAM(S): 80 CAPSULE, EXTENDED RELEASE ORAL at 05:50

## 2017-07-14 RX ADMIN — QUETIAPINE FUMARATE 50 MILLIGRAM(S): 200 TABLET, FILM COATED ORAL at 22:21

## 2017-07-14 RX ADMIN — Medication 650 MILLIGRAM(S): at 22:21

## 2017-07-14 RX ADMIN — AMIODARONE HYDROCHLORIDE 200 MILLIGRAM(S): 400 TABLET ORAL at 05:50

## 2017-07-14 RX ADMIN — WARFARIN SODIUM 2.5 MILLIGRAM(S): 2.5 TABLET ORAL at 22:21

## 2017-07-14 RX ADMIN — CARVEDILOL PHOSPHATE 6.25 MILLIGRAM(S): 80 CAPSULE, EXTENDED RELEASE ORAL at 17:14

## 2017-07-14 NOTE — CONSULT NOTE ADULT - SUBJECTIVE AND OBJECTIVE BOX
HISTORY OF PRESENT ILLNESS:  67M, NICM, CHF (EF10%  s/p ICD Dr. Kessler ), s/p LVAD 6/12.   Patient ntoed to have WCT on floor. EP consulted.     At bedside, patient sitting in chair. Has no complaints. Denies chest pain, dyspnea, dizziness, syncope, presyncope, orthopnea, edema, PND.       Allergies    No Known Allergies    Intolerances    	    MEDICATIONS:  amiodarone    Tablet 200 milliGRAM(s) Oral daily  spironolactone 12.5 milliGRAM(s) Oral daily  carvedilol 6.25 milliGRAM(s) Oral every 12 hours  furosemide    Tablet 20 milliGRAM(s) Oral daily        QUEtiapine 50 milliGRAM(s) Oral at bedtime  acetaminophen   Tablet. 650 milliGRAM(s) Oral every 6 hours PRN    docusate sodium 100 milliGRAM(s) Oral three times a day  pantoprazole    Tablet 40 milliGRAM(s) Oral before breakfast    colchicine 0.6 milliGRAM(s) Oral daily        PAST MEDICAL & SURGICAL HISTORY:  Ventricular fibrillation: s/p AICD  PAF (paroxysmal atrial fibrillation): on xarelto  Non-Ischemic Cardiomyopathy  SVT (Supraventricular Tachycardia)  HTN  CHF (Congestive Heart Failure)  Status post left hip replacement  History of Prior Ablation Treatment: for afib  AICD (Automatic Cardioverter/Defibrillator) Present: [employer] with 1 [employer] lead4/1/09- explanted and replaced with [employer] 2 leads on 9/2/09      FAMILY HISTORY:  No pertinent family history in first degree relatives      SOCIAL HISTORY:    [ ] Non-smoker  [ ] Smoker  [ ] Alcohol      REVIEW OF SYSTEMS:  General: no fatigue/malaise, weight loss/gain.  Skin: no rashes.  Ophthalmologic: no blurred vision, no loss of vision. 	  ENT: no sore throat, rhinorrhea, sinus congestion.  Respiratory: no SOB, cough or wheeze.  Gastrointestinal:  no N/V/D, no melena/hematemesis/hematochezia.  Genitourinary: no dysuria/hesitancy or hematuria.  Musculoskeletal: no myalgias or arthralgias.  Neurological: no changes in vision or hearing, no lightheadedness/dizziness, no syncope/near syncope	  Psychiatric: no unusual stress/anxiety.   Hematology/Lymphatics: no unusual bleeding, bruising and no lymphadenopathy.  Endocrine: no unusual thirst.   All others negative except as stated above and in HPI.    PHYSICAL EXAM:  T(C): 36.4 (07-14-17 @ 13:00), Max: 37 (07-14-17 @ 05:15)  HR: 99 (07-14-17 @ 13:00) (89 - 99)  BP: --  RR: 18 (07-14-17 @ 13:00) (16 - 18)  SpO2: 100% (07-14-17 @ 13:00) (99% - 100%)  Wt(kg): --  I&O's Summary    13 Jul 2017 07:01  -  14 Jul 2017 07:00  --------------------------------------------------------  IN: 1140 mL / OUT: 1250 mL / NET: -110 mL    14 Jul 2017 07:01  -  14 Jul 2017 14:48  --------------------------------------------------------  IN: 360 mL / OUT: 350 mL / NET: 10 mL        Appearance: Normal	  HEENT:   Normal oral mucosa, PERRL, EOMI	  Lymphatic: No lymphadenopathy  Cardiovascular: Normal S1 S2, No JVD, No murmurs, No edema  Respiratory: Lungs clear to auscultation	  Psychiatry: A & O x 3, Mood & affect appropriate  Gastrointestinal:  Soft, Non-tender, + BS	  Skin: No rashes, No ecchymoses, No cyanosis	  Neurologic: Non-focal  Extremities: Normal range of motion, No clubbing, cyanosis or edema  Vascular: Peripheral pulses palpable 2+ bilaterally        LABS:	 	    CBC Full  -  ( 14 Jul 2017 05:42 )  WBC Count : 12.7 K/uL  Hemoglobin : 8.1 g/dL  Hematocrit : 25.4 %  Platelet Count - Automated : 386 K/uL  Mean Cell Volume : 93.8 fl  Mean Cell Hemoglobin : 30.0 pg  Mean Cell Hemoglobin Concentration : 31.9 gm/dL  Auto Neutrophil # : x  Auto Lymphocyte # : x  Auto Monocyte # : x  Auto Eosinophil # : x  Auto Basophil # : x  Auto Neutrophil % : x  Auto Lymphocyte % : x  Auto Monocyte % : x  Auto Eosinophil % : x  Auto Basophil % : x    07-14    139  |  104  |  23  ----------------------------<  114<H>  4.2   |  22  |  1.16  07-13    139  |  105  |  22  ----------------------------<  93  4.3   |  22  |  1.19    Ca    8.7      14 Jul 2017 05:42  Ca    8.6      13 Jul 2017 05:53        proBNP:   Lipid Profile:   HgA1c:   TSH:       CARDIAC MARKERS:            TELEMETRY: 	    ECG:  	  RADIOLOGY:  OTHER: 	    PREVIOUS DIAGNOSTIC TESTING:    [ ] Echocardiogram:  [ ]  Catheterization:  [ ] Stress Test:  	  	  ASSESSMENT/PLAN:

## 2017-07-14 NOTE — CONSULT NOTE ADULT - CONSULT REQUESTED DATE/TIME
07-Jun-2017 12:49
09-Jun-2017 15:46
09-Jun-2017 17:16
13-Jun-2017 08:40
14-Jul-2017 14:46
27-Jun-2017
07-Jun-2017

## 2017-07-14 NOTE — PROGRESS NOTE ADULT - PROBLEM SELECTOR PLAN 3
Continue amiodarone 200 mg PO Daily   Continue with Coreg 6.25 mg PO BID   EP consult called and appreciated; start Mexiletine 150 mg PO BID

## 2017-07-14 NOTE — PROGRESS NOTE ADULT - SUBJECTIVE AND OBJECTIVE BOX
VITAL SIGNS    Telemetry:      Vital Signs Last 24 Hrs  T(C): 36.7 (17 @ 17:00), Max: 37 (17 @ 05:15)  T(F): 98.1 (17 @ 17:00), Max: 98.6 (17 @ 05:15)  HR: 87 (17 @ 17:00) (87 - 99)  BP: --  RR: 18 (17 @ 17:00) (16 - 18)  SpO2: 100% (17 @ 17:00) (99% - 100%)                    @ 07:01  -   @ 07:00  --------------------------------------------------------  IN: 1140 mL / OUT: 1250 mL / NET: -110 mL     @ 07:01  -   @ 19:46  --------------------------------------------------------  IN: 600 mL / OUT: 350 mL / NET: 250 mL         Daily Weight in k.4 (2017 08:13)        Coumadin    [X] YES          [  ]      NO         Reason:                               PHYSICAL EXAM        Neurology: alert and oriented x 3, nonfocal, no gross deficits    CV : + LVAD Hum     Sternal Wound :  CDI , Stable    Lungs: CTA B/L     Abdomen: soft, nontender, nondistended, positive bowel sounds, last bowel movement     : Voids              Extremities:  B/L LE Trace edema Negative calf tenderness        docusate sodium 100 milliGRAM(s) Oral three times a day  amiodarone    Tablet 200 milliGRAM(s) Oral daily  colchicine 0.6 milliGRAM(s) Oral daily  QUEtiapine 50 milliGRAM(s) Oral at bedtime  pantoprazole    Tablet 40 milliGRAM(s) Oral before breakfast  spironolactone 12.5 milliGRAM(s) Oral daily  acetaminophen   Tablet. 650 milliGRAM(s) Oral every 6 hours PRN  carvedilol 6.25 milliGRAM(s) Oral every 12 hours  furosemide    Tablet 20 milliGRAM(s) Oral daily  mexiletine 150 milliGRAM(s) Oral two times a day  hydrocortisone 1% Cream 1 Application(s) Topical three times a day  diphenhydrAMINE   Capsule 25 milliGRAM(s) Oral every 4 hours PRN  warfarin 2.5 milliGRAM(s) Oral once        Physical Therapy Rec:   Home  [  ]   Home w/ PT  [  ]  Rehab  [ X ]    Discussed with Cardiothoracic Team at AM rounds.

## 2017-07-14 NOTE — PROGRESS NOTE ADULT - I WAS PHYSICALLY PRESENT FOR THE KEY PORTIONS OF THE EVALUATION AND MANAGEMENT (E/M) SERVICE PROVIDED.  I AGREE WITH THE ABOVE HISTORY, PHYSICAL, AND PLAN WHICH I HAVE REVIEWED AND EDITED WHERE APPROPRIATE

## 2017-07-14 NOTE — PROGRESS NOTE ADULT - ATTENDING COMMENTS
No issues. Feels well. Awaiting bed in rehab.    On warfarin and remains off ASA. INR 2.26. Will continue warfarin 2.5 mg po QHS.  , Hgb stable at 8.1. MAP 82-90.  ICD interrogated and shows AF/flutter with only 1 episode of NSVT 7/6/17. On Amio and Coreg.    VAD interrogation has been performed daily. Was completely disconnected from power supply yesterday afternoon. He is aware of it and stated that someone was helping him and disconnected the 2nd power source before he connected the first one. Re-educated and will have VAD coordinator review with him again. No device changes made.

## 2017-07-14 NOTE — PROGRESS NOTE ADULT - ASSESSMENT
67 year old man with congestive heart failure with severely reduced LV systolic function due to a nonischemic dilated cardiomyopathy s/p HM2 LVAD on 6/12 with post-operative course complicated by  ventricular tachycardia and acute renal failure, which is improved and stable.  Acute blood loss anemia postop requiring multiple PRBC transfusions and 6/27 +guiac- GI following DR. James  pt placed on Protonix gttp  7/3 anticoagulation on Hold as Per CHF team    7/3 EGD: non-bleeding small superficial gastric ulcer,    7/4 capsule placed and retrieved / continue protonix gttp;  7/5   PRBC x 1, NPO   Push enteroscopy today for further evaluation at small intestine  7/6 Small bowel capsule showing blood in proximal small bowel.  Two non-bleeding angiodysplastic lesions in the jejunum.  Treated with argon plasma coagulation (APC).  Clips  were placed.  The pt is undergoing continued education and demonstration for discharge planning.    Episodes of wct, coreg increased 7/11/17 as d/w chf  7/13/17 35 Beat WCT; EP NP consult called to interrogate ICD  7/14 EP Consult (Dr. Cole)

## 2017-07-14 NOTE — CONSULT NOTE ADULT - ASSESSMENT
67M, NICM, CHF (EF10%  s/p ICD Dr. Kessler ), s/p LVAD 6/12. Post op NSVT , GIB.  Cr 1.48 electrolytes ok.  Device interrogation revealed rapid Afib EP consulted.     Afib  Patient on carvedilol 6.25 BID, quetiapine 50mgHS,  amiodarone 200mg QD.    - recommend increasing carvedilol as tolerated. 67M, NICM, CHF (EF10%  s/p ICD Dr. Kessler ), s/p LVAD 6/12. Post op NSVT , GIB.  Cr 1.48 electrolytes ok.  Device interrogation revealed rapid Afib EP consulted.     Afib  Patient on carvedilol 6.25 BID, quetiapine 50mgHS,  amiodarone 200mg QD.    - recommend increasing carvedilol as tolerated.  - pateint was supposed to be on mexilitine, fell off. please resume .   - EP to continue to follow  - please maintain K>4 , Mg>2  - For any questions or If there are any concerns, please call [phone] during daytime, covered by [id number] after-hours

## 2017-07-15 LAB
ALBUMIN SERPL ELPH-MCNC: 3 G/DL — LOW (ref 3.3–5)
ALP SERPL-CCNC: 154 U/L — HIGH (ref 40–120)
ALT FLD-CCNC: 28 U/L RC — SIGNIFICANT CHANGE UP (ref 10–45)
ANION GAP SERPL CALC-SCNC: 13 MMOL/L — SIGNIFICANT CHANGE UP (ref 5–17)
APTT BLD: 37.6 SEC — HIGH (ref 27.5–37.4)
AST SERPL-CCNC: 33 U/L — SIGNIFICANT CHANGE UP (ref 10–40)
BILIRUB SERPL-MCNC: 0.8 MG/DL — SIGNIFICANT CHANGE UP (ref 0.2–1.2)
BUN SERPL-MCNC: 24 MG/DL — HIGH (ref 7–23)
CALCIUM SERPL-MCNC: 8.7 MG/DL — SIGNIFICANT CHANGE UP (ref 8.4–10.5)
CHLORIDE SERPL-SCNC: 103 MMOL/L — SIGNIFICANT CHANGE UP (ref 96–108)
CO2 SERPL-SCNC: 22 MMOL/L — SIGNIFICANT CHANGE UP (ref 22–31)
CREAT SERPL-MCNC: 1.24 MG/DL — SIGNIFICANT CHANGE UP (ref 0.5–1.3)
GLUCOSE SERPL-MCNC: 100 MG/DL — HIGH (ref 70–99)
HCT VFR BLD CALC: 25.1 % — LOW (ref 39–50)
HGB BLD-MCNC: 8.2 G/DL — LOW (ref 13–17)
MCHC RBC-ENTMCNC: 31.1 PG — SIGNIFICANT CHANGE UP (ref 27–34)
MCHC RBC-ENTMCNC: 32.8 GM/DL — SIGNIFICANT CHANGE UP (ref 32–36)
MCV RBC AUTO: 95 FL — SIGNIFICANT CHANGE UP (ref 80–100)
PLATELET # BLD AUTO: 355 K/UL — SIGNIFICANT CHANGE UP (ref 150–400)
POTASSIUM SERPL-MCNC: 4.5 MMOL/L — SIGNIFICANT CHANGE UP (ref 3.5–5.3)
POTASSIUM SERPL-SCNC: 4.5 MMOL/L — SIGNIFICANT CHANGE UP (ref 3.5–5.3)
PROT SERPL-MCNC: 6.6 G/DL — SIGNIFICANT CHANGE UP (ref 6–8.3)
RBC # BLD: 2.64 M/UL — LOW (ref 4.2–5.8)
RBC # FLD: 17.7 % — HIGH (ref 10.3–14.5)
SODIUM SERPL-SCNC: 138 MMOL/L — SIGNIFICANT CHANGE UP (ref 135–145)
WBC # BLD: 14.3 K/UL — HIGH (ref 3.8–10.5)
WBC # FLD AUTO: 14.3 K/UL — HIGH (ref 3.8–10.5)

## 2017-07-15 RX ORDER — WARFARIN SODIUM 2.5 MG/1
3 TABLET ORAL ONCE
Qty: 0 | Refills: 0 | Status: COMPLETED | OUTPATIENT
Start: 2017-07-15 | End: 2017-07-15

## 2017-07-15 RX ADMIN — Medication 100 MILLIGRAM(S): at 05:10

## 2017-07-15 RX ADMIN — MEXILETINE HYDROCHLORIDE 150 MILLIGRAM(S): 150 CAPSULE ORAL at 05:10

## 2017-07-15 RX ADMIN — PANTOPRAZOLE SODIUM 40 MILLIGRAM(S): 20 TABLET, DELAYED RELEASE ORAL at 05:10

## 2017-07-15 RX ADMIN — MEXILETINE HYDROCHLORIDE 150 MILLIGRAM(S): 150 CAPSULE ORAL at 18:11

## 2017-07-15 RX ADMIN — AMIODARONE HYDROCHLORIDE 200 MILLIGRAM(S): 400 TABLET ORAL at 05:10

## 2017-07-15 RX ADMIN — Medication 100 MILLIGRAM(S): at 22:04

## 2017-07-15 RX ADMIN — Medication 650 MILLIGRAM(S): at 22:04

## 2017-07-15 RX ADMIN — Medication 100 MILLIGRAM(S): at 13:45

## 2017-07-15 RX ADMIN — Medication 1 APPLICATION(S): at 13:45

## 2017-07-15 RX ADMIN — CARVEDILOL PHOSPHATE 6.25 MILLIGRAM(S): 80 CAPSULE, EXTENDED RELEASE ORAL at 05:10

## 2017-07-15 RX ADMIN — Medication 0.6 MILLIGRAM(S): at 13:45

## 2017-07-15 RX ADMIN — QUETIAPINE FUMARATE 50 MILLIGRAM(S): 200 TABLET, FILM COATED ORAL at 22:05

## 2017-07-15 RX ADMIN — Medication 1 APPLICATION(S): at 22:03

## 2017-07-15 RX ADMIN — SPIRONOLACTONE 12.5 MILLIGRAM(S): 25 TABLET, FILM COATED ORAL at 05:10

## 2017-07-15 RX ADMIN — Medication 650 MILLIGRAM(S): at 22:49

## 2017-07-15 RX ADMIN — Medication 1 APPLICATION(S): at 05:09

## 2017-07-15 RX ADMIN — CARVEDILOL PHOSPHATE 6.25 MILLIGRAM(S): 80 CAPSULE, EXTENDED RELEASE ORAL at 18:11

## 2017-07-15 RX ADMIN — Medication 20 MILLIGRAM(S): at 05:10

## 2017-07-15 RX ADMIN — WARFARIN SODIUM 3 MILLIGRAM(S): 2.5 TABLET ORAL at 22:04

## 2017-07-15 NOTE — PROGRESS NOTE ADULT - ASSESSMENT
67 year old man with congestive heart failure with severely reduced LV systolic function due to a nonischemic dilated cardiomyopathy s/p HM2 LVAD on 6/12 with post-operative course complicated by  ventricular tachycardia and acute renal failure, which is improved and stable.  Acute blood loss anemia postop requiring multiple PRBC transfusions and 6/27 +guiac- GI following DR. James  pt placed on Protonix gttp  7/3 anticoagulation on Hold as Per CHF team    7/3 EGD: non-bleeding small superficial gastric ulcer,    7/4 capsule placed and retrieved / continue protonix gttp;  7/5   PRBC x 1, NPO   Push enteroscopy today for further evaluation at small intestine  7/6 Small bowel capsule showing blood in proximal small bowel.  Two non-bleeding angiodysplastic lesions in the jejunum.  Treated with argon plasma coagulation (APC).  Clips  were placed.  The pt is undergoing continued education and demonstration for discharge planning.    Episodes of wct, coreg increased 7/11/17 as d/w chf  7/13/17 35 Beat WCT; EP NP consult called to interrogate ICD  7/14 EP Consult (Dr. Cole) --> Started on Mexiletine 150 mg PO BID

## 2017-07-15 NOTE — PROGRESS NOTE ADULT - SUBJECTIVE AND OBJECTIVE BOX
VITAL SIGNS    Telemetry: NSR/ ST  (21 Beat WCT)     Vital Signs Last 24 Hrs  T(C): 36.5 (07-15-17 @ 13:30), Max: 37.1 (17 @ 21:00)  T(F): 97.7 (07-15-17 @ 13:30), Max: 98.8 (17 @ 21:00)  HR: 96 (07-15-17 @ 13:30) (89 - 108)  BP: --  RR: 18 (07-15-17 @ 13:30) (18 - 18)  SpO2: 100% (07-15-17 @ 13:30) (98% - 100%)                    @ 07:01  -  07-15 @ 07:00  --------------------------------------------------------  IN: 1120 mL / OUT: 970 mL / NET: 150 mL    07-15 @ 07:01  -  07-15 @ 17:27  --------------------------------------------------------  IN: 840 mL / OUT: 1400 mL / NET: -560 mL          Daily     Daily Weight in k.1 (15 Jul 2017 08:00)        CAPILLARY BLOOD GLUCOSE           PHYSICAL EXAM        Neurology: alert and oriented x 3, nonfocal, no gross deficits    CV : + LVAD Hum     Sternal Wound :  CDI , Stable; Dry line site C/D/I     Lungs: CTA     Abdomen: soft, nontender, nondistended, positive bowel sounds, last bowel movement     : Voiding            Extremities:  B/L (-) edema; Negative calf tenderness            docusate sodium 100 milliGRAM(s) Oral three times a day  amiodarone    Tablet 200 milliGRAM(s) Oral daily  colchicine 0.6 milliGRAM(s) Oral daily  QUEtiapine 50 milliGRAM(s) Oral at bedtime  pantoprazole    Tablet 40 milliGRAM(s) Oral before breakfast  spironolactone 12.5 milliGRAM(s) Oral daily  acetaminophen   Tablet. 650 milliGRAM(s) Oral every 6 hours PRN  carvedilol 6.25 milliGRAM(s) Oral every 12 hours  furosemide    Tablet 20 milliGRAM(s) Oral daily  mexiletine 150 milliGRAM(s) Oral two times a day  hydrocortisone 1% Cream 1 Application(s) Topical three times a day  diphenhydrAMINE   Capsule 25 milliGRAM(s) Oral every 4 hours PRN  warfarin 3 milliGRAM(s) Oral once      Physical Therapy Rec:   Home  [  ]   Home w/ PT  [  ]  Rehab  [ X ]    Discussed with Cardiothoracic Team at AM rounds.

## 2017-07-16 LAB
ANION GAP SERPL CALC-SCNC: 11 MMOL/L — SIGNIFICANT CHANGE UP (ref 5–17)
BUN SERPL-MCNC: 26 MG/DL — HIGH (ref 7–23)
CALCIUM SERPL-MCNC: 8.6 MG/DL — SIGNIFICANT CHANGE UP (ref 8.4–10.5)
CHLORIDE SERPL-SCNC: 103 MMOL/L — SIGNIFICANT CHANGE UP (ref 96–108)
CO2 SERPL-SCNC: 23 MMOL/L — SIGNIFICANT CHANGE UP (ref 22–31)
CREAT SERPL-MCNC: 1.18 MG/DL — SIGNIFICANT CHANGE UP (ref 0.5–1.3)
GLUCOSE SERPL-MCNC: 98 MG/DL — SIGNIFICANT CHANGE UP (ref 70–99)
HCT VFR BLD CALC: 24.4 % — LOW (ref 39–50)
HGB BLD-MCNC: 8 G/DL — LOW (ref 13–17)
INR BLD: 2.05 RATIO — HIGH (ref 0.88–1.16)
LDH SERPL L TO P-CCNC: 262 U/L — HIGH (ref 50–242)
MCHC RBC-ENTMCNC: 31.3 PG — SIGNIFICANT CHANGE UP (ref 27–34)
MCHC RBC-ENTMCNC: 32.8 GM/DL — SIGNIFICANT CHANGE UP (ref 32–36)
MCV RBC AUTO: 95.4 FL — SIGNIFICANT CHANGE UP (ref 80–100)
PLATELET # BLD AUTO: 375 K/UL — SIGNIFICANT CHANGE UP (ref 150–400)
POTASSIUM SERPL-MCNC: 4.5 MMOL/L — SIGNIFICANT CHANGE UP (ref 3.5–5.3)
POTASSIUM SERPL-SCNC: 4.5 MMOL/L — SIGNIFICANT CHANGE UP (ref 3.5–5.3)
PROTHROM AB SERPL-ACNC: 22.7 SEC — HIGH (ref 9.8–12.7)
RBC # BLD: 2.56 M/UL — LOW (ref 4.2–5.8)
RBC # FLD: 18.1 % — HIGH (ref 10.3–14.5)
SODIUM SERPL-SCNC: 137 MMOL/L — SIGNIFICANT CHANGE UP (ref 135–145)
WBC # BLD: 13 K/UL — HIGH (ref 3.8–10.5)
WBC # FLD AUTO: 13 K/UL — HIGH (ref 3.8–10.5)

## 2017-07-16 RX ORDER — WARFARIN SODIUM 2.5 MG/1
3 TABLET ORAL ONCE
Qty: 0 | Refills: 0 | Status: COMPLETED | OUTPATIENT
Start: 2017-07-16 | End: 2017-07-16

## 2017-07-16 RX ADMIN — Medication 1 APPLICATION(S): at 22:15

## 2017-07-16 RX ADMIN — AMIODARONE HYDROCHLORIDE 200 MILLIGRAM(S): 400 TABLET ORAL at 05:50

## 2017-07-16 RX ADMIN — Medication 20 MILLIGRAM(S): at 05:50

## 2017-07-16 RX ADMIN — PANTOPRAZOLE SODIUM 40 MILLIGRAM(S): 20 TABLET, DELAYED RELEASE ORAL at 05:51

## 2017-07-16 RX ADMIN — Medication 1 APPLICATION(S): at 12:57

## 2017-07-16 RX ADMIN — Medication 1 APPLICATION(S): at 05:50

## 2017-07-16 RX ADMIN — QUETIAPINE FUMARATE 50 MILLIGRAM(S): 200 TABLET, FILM COATED ORAL at 22:14

## 2017-07-16 RX ADMIN — CARVEDILOL PHOSPHATE 6.25 MILLIGRAM(S): 80 CAPSULE, EXTENDED RELEASE ORAL at 05:50

## 2017-07-16 RX ADMIN — WARFARIN SODIUM 3 MILLIGRAM(S): 2.5 TABLET ORAL at 22:14

## 2017-07-16 RX ADMIN — Medication 650 MILLIGRAM(S): at 14:04

## 2017-07-16 RX ADMIN — MEXILETINE HYDROCHLORIDE 150 MILLIGRAM(S): 150 CAPSULE ORAL at 17:55

## 2017-07-16 RX ADMIN — Medication 100 MILLIGRAM(S): at 12:56

## 2017-07-16 RX ADMIN — CARVEDILOL PHOSPHATE 6.25 MILLIGRAM(S): 80 CAPSULE, EXTENDED RELEASE ORAL at 17:55

## 2017-07-16 RX ADMIN — Medication 650 MILLIGRAM(S): at 14:50

## 2017-07-16 RX ADMIN — SPIRONOLACTONE 12.5 MILLIGRAM(S): 25 TABLET, FILM COATED ORAL at 05:51

## 2017-07-16 RX ADMIN — Medication 0.6 MILLIGRAM(S): at 12:56

## 2017-07-16 RX ADMIN — Medication 100 MILLIGRAM(S): at 05:50

## 2017-07-16 RX ADMIN — MEXILETINE HYDROCHLORIDE 150 MILLIGRAM(S): 150 CAPSULE ORAL at 05:52

## 2017-07-16 RX ADMIN — Medication 100 MILLIGRAM(S): at 22:15

## 2017-07-16 RX ADMIN — Medication 25 MILLIGRAM(S): at 03:08

## 2017-07-16 NOTE — PROGRESS NOTE ADULT - PROBLEM SELECTOR PLAN 3
Continue amiodarone 200 mg PO Daily   Continue with Coreg 6.25 mg PO BID  Continue with Mexiletine 150 mg PO BID; EP following

## 2017-07-16 NOTE — PROGRESS NOTE ADULT - SUBJECTIVE AND OBJECTIVE BOX
VITAL SIGNS    Telemetry: NSR / ST  (PVC's / 6 Beat WCT)     Vital Signs Last 24 Hrs  T(C): 36.8 (17 @ 09:41), Max: 37.1 (07-15-17 @ 17:45)  T(F): 98.3 (17 @ 09:41), Max: 98.8 (07-15-17 @ 17:45)  HR: 97 (17 @ 09:41) (90 - 103)  BP: --  RR: 18 (17 @ 09:41) (18 - 18)  SpO2: 100% (17 @ 09:41) (99% - 100%)             07-15 @ 07:01  -   @ 07:00  --------------------------------------------------------  IN: 1520 mL / OUT: 2690 mL / NET: -1170 mL     @ 07:01  -   @ 12:50  --------------------------------------------------------  IN: 360 mL / OUT: 225 mL / NET: 135 mL      Daily Weight in k.2 (2017 08:00)        Drains:  RLQ Driveline cath site C/D/I       PHYSICAL EXAM    Neurology: alert and oriented x 3, nonfocal, no gross deficits    CV : (+) LVAD Hum     Sternal Wound :  CDI , Stable;  RLQ Driveline cath site C/D/I     Lungs: CTA B/L     Abdomen: soft, nontender, nondistended, positive bowel sounds, last bowel movement     : Voiding            Extremities: B/L LE negative calf tenderness; B/L negative edema            docusate sodium 100 milliGRAM(s) Oral three times a day  amiodarone  Tablet 200 milliGRAM(s) Oral daily  colchicine 0.6 milliGRAM(s) Oral daily  QUEtiapine 50 milliGRAM(s) Oral at bedtime  pantoprazole  Tablet 40 milliGRAM(s) Oral before breakfast  spironolactone 12.5 milliGRAM(s) Oral daily  acetaminophen Tablet. 650 milliGRAM(s) Oral every 6 hours PRN  carvedilol 6.25 milliGRAM(s) Oral every 12 hours  furosemide  Tablet 20 milliGRAM(s) Oral daily  mexiletine 150 milliGRAM(s) Oral two times a day  hydrocortisone 1% Cream 1 Application(s) Topical three times a day  diphenhydrAMINE   Capsule 25 milliGRAM(s) Oral every 4 hours PRN          Physical Therapy Rec:   Home  [  ]   Home w/ PT  [  ]  Rehab  [X ] [place]     Discussed with Cardiothoracic Team at AM rounds.

## 2017-07-17 VITALS — TEMPERATURE: 98 F

## 2017-07-17 LAB
ANION GAP SERPL CALC-SCNC: 11 MMOL/L — SIGNIFICANT CHANGE UP (ref 5–17)
BUN SERPL-MCNC: 28 MG/DL — HIGH (ref 7–23)
CALCIUM SERPL-MCNC: 8.8 MG/DL — SIGNIFICANT CHANGE UP (ref 8.4–10.5)
CHLORIDE SERPL-SCNC: 103 MMOL/L — SIGNIFICANT CHANGE UP (ref 96–108)
CO2 SERPL-SCNC: 24 MMOL/L — SIGNIFICANT CHANGE UP (ref 22–31)
CREAT SERPL-MCNC: 1.16 MG/DL — SIGNIFICANT CHANGE UP (ref 0.5–1.3)
GLUCOSE SERPL-MCNC: 88 MG/DL — SIGNIFICANT CHANGE UP (ref 70–99)
HAPTOGLOB SERPL-MCNC: 151 MG/DL — SIGNIFICANT CHANGE UP (ref 34–200)
HCT VFR BLD CALC: 24.4 % — LOW (ref 39–50)
HGB BLD-MCNC: 7.7 G/DL — LOW (ref 13–17)
INR BLD: 2.03 RATIO — HIGH (ref 0.88–1.16)
LDH SERPL L TO P-CCNC: 262 U/L — HIGH (ref 50–242)
MCHC RBC-ENTMCNC: 30.4 PG — SIGNIFICANT CHANGE UP (ref 27–34)
MCHC RBC-ENTMCNC: 31.6 GM/DL — LOW (ref 32–36)
MCV RBC AUTO: 96.3 FL — SIGNIFICANT CHANGE UP (ref 80–100)
PLATELET # BLD AUTO: 356 K/UL — SIGNIFICANT CHANGE UP (ref 150–400)
POTASSIUM SERPL-MCNC: 4.6 MMOL/L — SIGNIFICANT CHANGE UP (ref 3.5–5.3)
POTASSIUM SERPL-SCNC: 4.6 MMOL/L — SIGNIFICANT CHANGE UP (ref 3.5–5.3)
PROTHROM AB SERPL-ACNC: 22.5 SEC — HIGH (ref 9.8–12.7)
RBC # BLD: 2.54 M/UL — LOW (ref 4.2–5.8)
RBC # FLD: 18.6 % — HIGH (ref 10.3–14.5)
SODIUM SERPL-SCNC: 138 MMOL/L — SIGNIFICANT CHANGE UP (ref 135–145)
WBC # BLD: 14.7 K/UL — HIGH (ref 3.8–10.5)
WBC # FLD AUTO: 14.7 K/UL — HIGH (ref 3.8–10.5)

## 2017-07-17 PROCEDURE — P9016: CPT

## 2017-07-17 PROCEDURE — 95951: CPT

## 2017-07-17 PROCEDURE — 97116 GAIT TRAINING THERAPY: CPT

## 2017-07-17 PROCEDURE — 88307 TISSUE EXAM BY PATHOLOGIST: CPT

## 2017-07-17 PROCEDURE — 85730 THROMBOPLASTIN TIME PARTIAL: CPT

## 2017-07-17 PROCEDURE — 84540 ASSAY OF URINE/UREA-N: CPT

## 2017-07-17 PROCEDURE — 80176 ASSAY OF LIDOCAINE: CPT

## 2017-07-17 PROCEDURE — 82746 ASSAY OF FOLIC ACID SERUM: CPT

## 2017-07-17 PROCEDURE — 84484 ASSAY OF TROPONIN QUANT: CPT

## 2017-07-17 PROCEDURE — C1894: CPT

## 2017-07-17 PROCEDURE — 82247 BILIRUBIN TOTAL: CPT

## 2017-07-17 PROCEDURE — P9045: CPT

## 2017-07-17 PROCEDURE — 93970 EXTREMITY STUDY: CPT

## 2017-07-17 PROCEDURE — 86923 COMPATIBILITY TEST ELECTRIC: CPT

## 2017-07-17 PROCEDURE — 74018 RADEX ABDOMEN 1 VIEW: CPT

## 2017-07-17 PROCEDURE — 97161 PT EVAL LOW COMPLEX 20 MIN: CPT

## 2017-07-17 PROCEDURE — 85362 FIBRIN DEGRADATION PRODUCTS: CPT

## 2017-07-17 PROCEDURE — 99285 EMERGENCY DEPT VISIT HI MDM: CPT

## 2017-07-17 PROCEDURE — 82607 VITAMIN B-12: CPT

## 2017-07-17 PROCEDURE — 82435 ASSAY OF BLOOD CHLORIDE: CPT

## 2017-07-17 PROCEDURE — 81001 URINALYSIS AUTO W/SCOPE: CPT

## 2017-07-17 PROCEDURE — 83874 ASSAY OF MYOGLOBIN: CPT

## 2017-07-17 PROCEDURE — C1889: CPT

## 2017-07-17 PROCEDURE — C1769: CPT

## 2017-07-17 PROCEDURE — 97530 THERAPEUTIC ACTIVITIES: CPT

## 2017-07-17 PROCEDURE — 84100 ASSAY OF PHOSPHORUS: CPT

## 2017-07-17 PROCEDURE — 82533 TOTAL CORTISOL: CPT

## 2017-07-17 PROCEDURE — 80202 ASSAY OF VANCOMYCIN: CPT

## 2017-07-17 PROCEDURE — 85384 FIBRINOGEN ACTIVITY: CPT

## 2017-07-17 PROCEDURE — 86334 IMMUNOFIX E-PHORESIS SERUM: CPT

## 2017-07-17 PROCEDURE — 83521 IG LIGHT CHAINS FREE EACH: CPT

## 2017-07-17 PROCEDURE — 94799 UNLISTED PULMONARY SVC/PX: CPT

## 2017-07-17 PROCEDURE — 85611 PROTHROMBIN TEST: CPT

## 2017-07-17 PROCEDURE — P9037: CPT

## 2017-07-17 PROCEDURE — 84165 PROTEIN E-PHORESIS SERUM: CPT

## 2017-07-17 PROCEDURE — 83605 ASSAY OF LACTIC ACID: CPT

## 2017-07-17 PROCEDURE — 85027 COMPLETE CBC AUTOMATED: CPT

## 2017-07-17 PROCEDURE — 85379 FIBRIN DEGRADATION QUANT: CPT

## 2017-07-17 PROCEDURE — 82553 CREATINE MB FRACTION: CPT

## 2017-07-17 PROCEDURE — 94003 VENT MGMT INPAT SUBQ DAY: CPT

## 2017-07-17 PROCEDURE — 85670 THROMBIN TIME PLASMA: CPT

## 2017-07-17 PROCEDURE — 80061 LIPID PANEL: CPT

## 2017-07-17 PROCEDURE — 86900 BLOOD TYPING SEROLOGIC ABO: CPT

## 2017-07-17 PROCEDURE — 85260 CLOT FACTOR X STUART-POWER: CPT

## 2017-07-17 PROCEDURE — 93308 TTE F-UP OR LMTD: CPT

## 2017-07-17 PROCEDURE — 82947 ASSAY GLUCOSE BLOOD QUANT: CPT

## 2017-07-17 PROCEDURE — 80076 HEPATIC FUNCTION PANEL: CPT

## 2017-07-17 PROCEDURE — 83735 ASSAY OF MAGNESIUM: CPT

## 2017-07-17 PROCEDURE — 85610 PROTHROMBIN TIME: CPT

## 2017-07-17 PROCEDURE — 76705 ECHO EXAM OF ABDOMEN: CPT

## 2017-07-17 PROCEDURE — P9041: CPT

## 2017-07-17 PROCEDURE — P9047: CPT

## 2017-07-17 PROCEDURE — 71045 X-RAY EXAM CHEST 1 VIEW: CPT

## 2017-07-17 PROCEDURE — 97166 OT EVAL MOD COMPLEX 45 MIN: CPT

## 2017-07-17 PROCEDURE — 97163 PT EVAL HIGH COMPLEX 45 MIN: CPT

## 2017-07-17 PROCEDURE — 84436 ASSAY OF TOTAL THYROXINE: CPT

## 2017-07-17 PROCEDURE — 84145 PROCALCITONIN (PCT): CPT

## 2017-07-17 PROCEDURE — 84295 ASSAY OF SERUM SODIUM: CPT

## 2017-07-17 PROCEDURE — 82550 ASSAY OF CK (CPK): CPT

## 2017-07-17 PROCEDURE — 95819 EEG AWAKE AND ASLEEP: CPT

## 2017-07-17 PROCEDURE — 76700 US EXAM ABDOM COMPLETE: CPT

## 2017-07-17 PROCEDURE — 36430 TRANSFUSION BLD/BLD COMPNT: CPT

## 2017-07-17 PROCEDURE — 93451 RIGHT HEART CATH: CPT

## 2017-07-17 PROCEDURE — 97110 THERAPEUTIC EXERCISES: CPT

## 2017-07-17 PROCEDURE — 82330 ASSAY OF CALCIUM: CPT

## 2017-07-17 PROCEDURE — 84443 ASSAY THYROID STIM HORMONE: CPT

## 2017-07-17 PROCEDURE — 80048 BASIC METABOLIC PNL TOTAL CA: CPT

## 2017-07-17 PROCEDURE — 84132 ASSAY OF SERUM POTASSIUM: CPT

## 2017-07-17 PROCEDURE — 84134 ASSAY OF PREALBUMIN: CPT

## 2017-07-17 PROCEDURE — 83550 IRON BINDING TEST: CPT

## 2017-07-17 PROCEDURE — 87040 BLOOD CULTURE FOR BACTERIA: CPT

## 2017-07-17 PROCEDURE — 82272 OCCULT BLD FECES 1-3 TESTS: CPT

## 2017-07-17 PROCEDURE — 85732 THROMBOPLASTIN TIME PARTIAL: CPT

## 2017-07-17 PROCEDURE — 82565 ASSAY OF CREATININE: CPT

## 2017-07-17 PROCEDURE — 86901 BLOOD TYPING SEROLOGIC RH(D): CPT

## 2017-07-17 PROCEDURE — 94002 VENT MGMT INPAT INIT DAY: CPT

## 2017-07-17 PROCEDURE — 84156 ASSAY OF PROTEIN URINE: CPT

## 2017-07-17 PROCEDURE — 83036 HEMOGLOBIN GLYCOSYLATED A1C: CPT

## 2017-07-17 PROCEDURE — 86891 AUTOLOGOUS BLOOD OP SALVAGE: CPT

## 2017-07-17 PROCEDURE — 82248 BILIRUBIN DIRECT: CPT

## 2017-07-17 PROCEDURE — 82803 BLOOD GASES ANY COMBINATION: CPT

## 2017-07-17 PROCEDURE — C1887: CPT

## 2017-07-17 PROCEDURE — 84155 ASSAY OF PROTEIN SERUM: CPT

## 2017-07-17 PROCEDURE — 93005 ELECTROCARDIOGRAM TRACING: CPT

## 2017-07-17 PROCEDURE — 31720 CLEARANCE OF AIRWAYS: CPT

## 2017-07-17 PROCEDURE — 93321 DOPPLER ECHO F-UP/LMTD STD: CPT

## 2017-07-17 PROCEDURE — C8924: CPT

## 2017-07-17 PROCEDURE — 80053 COMPREHEN METABOLIC PANEL: CPT

## 2017-07-17 PROCEDURE — 86022 PLATELET ANTIBODIES: CPT

## 2017-07-17 PROCEDURE — 93306 TTE W/DOPPLER COMPLETE: CPT

## 2017-07-17 PROCEDURE — 83010 ASSAY OF HAPTOGLOBIN QUANT: CPT

## 2017-07-17 PROCEDURE — 86850 RBC ANTIBODY SCREEN: CPT

## 2017-07-17 PROCEDURE — 82570 ASSAY OF URINE CREATININE: CPT

## 2017-07-17 PROCEDURE — 85230 CLOT FACTOR VII PROCONVERTIN: CPT

## 2017-07-17 PROCEDURE — 33990 INSJ PERQ VAD L HRT ARTERIAL: CPT

## 2017-07-17 PROCEDURE — 83615 LACTATE (LD) (LDH) ENZYME: CPT

## 2017-07-17 PROCEDURE — 84480 ASSAY TRIIODOTHYRONINE (T3): CPT

## 2017-07-17 PROCEDURE — 97112 NEUROMUSCULAR REEDUCATION: CPT

## 2017-07-17 PROCEDURE — 84300 ASSAY OF URINE SODIUM: CPT

## 2017-07-17 PROCEDURE — 82728 ASSAY OF FERRITIN: CPT

## 2017-07-17 PROCEDURE — 87070 CULTURE OTHR SPECIMN AEROBIC: CPT

## 2017-07-17 PROCEDURE — 85014 HEMATOCRIT: CPT

## 2017-07-17 PROCEDURE — 93926 LOWER EXTREMITY STUDY: CPT

## 2017-07-17 PROCEDURE — 95957 EEG DIGITAL ANALYSIS: CPT

## 2017-07-17 PROCEDURE — 82436 ASSAY OF URINE CHLORIDE: CPT

## 2017-07-17 PROCEDURE — 84466 ASSAY OF TRANSFERRIN: CPT

## 2017-07-17 RX ORDER — PANTOPRAZOLE SODIUM 20 MG/1
1 TABLET, DELAYED RELEASE ORAL
Qty: 0 | Refills: 0 | COMMUNITY
Start: 2017-07-17

## 2017-07-17 RX ORDER — COLCHICINE 0.6 MG
1 TABLET ORAL
Qty: 0 | Refills: 0 | COMMUNITY
Start: 2017-07-17

## 2017-07-17 RX ORDER — QUETIAPINE FUMARATE 200 MG/1
1 TABLET, FILM COATED ORAL
Qty: 0 | Refills: 0 | COMMUNITY
Start: 2017-07-17

## 2017-07-17 RX ORDER — CARVEDILOL PHOSPHATE 80 MG/1
1 CAPSULE, EXTENDED RELEASE ORAL
Qty: 0 | Refills: 0 | COMMUNITY
Start: 2017-07-17

## 2017-07-17 RX ORDER — FUROSEMIDE 40 MG
1 TABLET ORAL
Qty: 0 | Refills: 0 | COMMUNITY
Start: 2017-07-17

## 2017-07-17 RX ORDER — DIPHENHYDRAMINE HCL 50 MG
1 CAPSULE ORAL
Qty: 0 | Refills: 0 | COMMUNITY
Start: 2017-07-17

## 2017-07-17 RX ORDER — WARFARIN SODIUM 2.5 MG/1
1 TABLET ORAL
Qty: 0 | Refills: 0 | COMMUNITY
Start: 2017-07-17

## 2017-07-17 RX ORDER — ACETAMINOPHEN 500 MG
2 TABLET ORAL
Qty: 0 | Refills: 0 | COMMUNITY
Start: 2017-07-17

## 2017-07-17 RX ORDER — WARFARIN SODIUM 2.5 MG/1
5 TABLET ORAL ONCE
Qty: 0 | Refills: 0 | Status: DISCONTINUED | OUTPATIENT
Start: 2017-07-17 | End: 2017-07-17

## 2017-07-17 RX ORDER — MULTIVIT-MIN/FERROUS GLUCONATE 9 MG/15 ML
1 LIQUID (ML) ORAL
Qty: 0 | Refills: 0 | COMMUNITY

## 2017-07-17 RX ORDER — DOCUSATE SODIUM 100 MG
1 CAPSULE ORAL
Qty: 0 | Refills: 0 | COMMUNITY
Start: 2017-07-17

## 2017-07-17 RX ORDER — HYDROCORTISONE 1 %
1 OINTMENT (GRAM) TOPICAL
Qty: 0 | Refills: 0 | COMMUNITY
Start: 2017-07-17

## 2017-07-17 RX ORDER — SPIRONOLACTONE 25 MG/1
0.5 TABLET, FILM COATED ORAL
Qty: 0 | Refills: 0 | COMMUNITY
Start: 2017-07-17

## 2017-07-17 RX ORDER — AMIODARONE HYDROCHLORIDE 400 MG/1
1 TABLET ORAL
Qty: 0 | Refills: 0 | COMMUNITY
Start: 2017-07-17

## 2017-07-17 RX ORDER — ALPRAZOLAM 0.25 MG
0.25 TABLET ORAL ONCE
Qty: 0 | Refills: 0 | Status: DISCONTINUED | OUTPATIENT
Start: 2017-07-17 | End: 2017-07-17

## 2017-07-17 RX ORDER — MEXILETINE HYDROCHLORIDE 150 MG/1
1 CAPSULE ORAL
Qty: 0 | Refills: 0 | COMMUNITY
Start: 2017-07-17

## 2017-07-17 RX ORDER — WARFARIN SODIUM 2.5 MG/1
3 TABLET ORAL ONCE
Qty: 0 | Refills: 0 | Status: DISCONTINUED | OUTPATIENT
Start: 2017-07-17 | End: 2017-07-17

## 2017-07-17 RX ADMIN — SPIRONOLACTONE 12.5 MILLIGRAM(S): 25 TABLET, FILM COATED ORAL at 05:22

## 2017-07-17 RX ADMIN — Medication 20 MILLIGRAM(S): at 05:22

## 2017-07-17 RX ADMIN — PANTOPRAZOLE SODIUM 40 MILLIGRAM(S): 20 TABLET, DELAYED RELEASE ORAL at 06:38

## 2017-07-17 RX ADMIN — Medication 100 MILLIGRAM(S): at 05:22

## 2017-07-17 RX ADMIN — MEXILETINE HYDROCHLORIDE 150 MILLIGRAM(S): 150 CAPSULE ORAL at 05:21

## 2017-07-17 RX ADMIN — Medication 0.6 MILLIGRAM(S): at 13:10

## 2017-07-17 RX ADMIN — Medication 0.25 MILLIGRAM(S): at 00:14

## 2017-07-17 RX ADMIN — Medication 1 APPLICATION(S): at 05:22

## 2017-07-17 RX ADMIN — Medication 1 APPLICATION(S): at 13:10

## 2017-07-17 RX ADMIN — Medication 100 MILLIGRAM(S): at 13:10

## 2017-07-17 RX ADMIN — AMIODARONE HYDROCHLORIDE 200 MILLIGRAM(S): 400 TABLET ORAL at 05:22

## 2017-07-17 RX ADMIN — CARVEDILOL PHOSPHATE 6.25 MILLIGRAM(S): 80 CAPSULE, EXTENDED RELEASE ORAL at 05:22

## 2017-07-17 NOTE — PROGRESS NOTE ADULT - PROBLEM SELECTOR PROBLEM 1
NORMAN (acute kidney injury)
Acute on chronic systolic (congestive) heart failure
NORMAN (acute kidney injury)
Acute blood loss anemia
Acute decompensated heart failure
Acute on chronic systolic (congestive) heart failure
LVAD (left ventricular assist device) present
LVAD (left ventricular assist device) present
NICM (nonischemic cardiomyopathy)
NORMAN (acute kidney injury)
Supratherapeutic INR
Cardiogenic shock
Hypokalemia
LVAD (left ventricular assist device) present
Acute blood loss anemia
Acute decompensated heart failure
Acute on chronic systolic (congestive) heart failure
NORMAN (acute kidney injury)
NICM (nonischemic cardiomyopathy)

## 2017-07-17 NOTE — PROGRESS NOTE ADULT - SUBJECTIVE AND OBJECTIVE BOX
I feel good       docusate sodium 100 milliGRAM(s) Oral three times a day  amiodarone    Tablet 200 milliGRAM(s) Oral daily  colchicine 0.6 milliGRAM(s) Oral daily  QUEtiapine 50 milliGRAM(s) Oral at bedtime  pantoprazole    Tablet 40 milliGRAM(s) Oral before breakfast  spironolactone 12.5 milliGRAM(s) Oral daily  acetaminophen   Tablet. 650 milliGRAM(s) Oral every 6 hours PRN  carvedilol 6.25 milliGRAM(s) Oral every 12 hours  furosemide    Tablet 20 milliGRAM(s) Oral daily  mexiletine 150 milliGRAM(s) Oral two times a day  hydrocortisone 1% Cream 1 Application(s) Topical three times a day  diphenhydrAMINE   Capsule 25 milliGRAM(s) Oral every 4 hours PRN  warfarin 3 milliGRAM(s) Oral once        REVIEW OF SYSTEMS:  Vital Signs Last 24 Hrs  T(C): 36.5 (2017 08:20), Max: 36.8 (2017 17:56)  T(F): 97.7 (2017 08:20), Max: 98.3 (2017 17:56)  HR: 101 (2017 04:00) (90 - 101)  BP: --  BP(mean): 88 (2017 08:20) (82 - 96)  RR: 17 (2017 04:00) (17 - 18)  SpO2: 100% (2017 04:00) (100% - 100%)    PHYSICAL EXAM:  General: A/ox 3, No acute Distress  Neck: Supple,  JVD  Cardiac: S1 S2, No M/R/G  Pulmonary: CTAB, Breathing unlabored, No Rhonchi/Rales/Wheezing  Abdomen: Soft, Non -tender, +BS   Extremities: No Rashes, edema  Neuro: A/o x 3, No focal deficits  Psch: normal mood , normal affect    LABS:  cret                        7.7    14.7  )-----------( 356      ( 2017 05:21 )             24.4     07-    138  |  103  |  28<H>  ----------------------------<  88  4.6   |  24  |  1.16    Ca    8.8      2017 05:21    TPro  6.7  /  Alb  3.1<L>  /  TBili  0.8  /  DBili  0.4<H>  /  AST  28  /  ALT  25  /  AlkPhos  156<H>  07-16    PT/INR - ( 2017 05:44 )   PT: 22.5 sec;   INR: 2.03 ratio         PTT - ( 2017 05:06 )  PTT:35.9 sec  Daily     Daily Weight in k.3 (2017 08:00)  I&O's Detail    2017 07:01  -  2017 07:00  --------------------------------------------------------  IN:    Oral Fluid: 400 mL  Total IN: 400 mL    OUT:    Voided: 2025 mL  Total OUT: 2025 mL    Total NET: -1625 mL      LVAD Interrogation:  Pump Flow:4.8  Pump Speed:9200  Pulse Index:6.3  Pump Power:5.4  VAD Events: no   Driveline evaluation:    Programming Changes: [ ] No changes made [ ] Changes made:

## 2017-07-17 NOTE — PROGRESS NOTE ADULT - PROBLEM SELECTOR PLAN 1
continue present medications for discharge   LVAD teaching completed and successful with support of family in place   d/c planning for rehab   daily weights , fluid intake and dietary d/w pt   pt to follow up with in 5- 7 days with Heart Failure team

## 2017-07-17 NOTE — PROGRESS NOTE ADULT - PROBLEM/PLAN-3
DISPLAY PLAN FREE TEXT

## 2017-07-17 NOTE — PROGRESS NOTE ADULT - PROVIDER SPECIALTY LIST ADULT
CCU
CT Surgery
Cardiology
Cardiology
Colorectal Surgery
Critical Care
Gastroenterology
Heart Failure
Heme/Onc
Nephrology
Neurology
Neurology
Psychiatry
CT Surgery
Heart Failure
Heart Failure
Nephrology
Psychiatry
Heart Failure
Heart Failure

## 2017-07-17 NOTE — PROGRESS NOTE ADULT - PROBLEM/PLAN-2
DISPLAY PLAN FREE TEXT

## 2017-07-24 ENCOUNTER — INPATIENT (INPATIENT)
Facility: HOSPITAL | Age: 67
LOS: 6 days | Discharge: HOME | DRG: 378 | End: 2017-07-31
Attending: THORACIC SURGERY (CARDIOTHORACIC VASCULAR SURGERY) | Admitting: INTERNAL MEDICINE
Payer: MEDICARE

## 2017-07-24 VITALS — HEART RATE: 99 BPM | OXYGEN SATURATION: 100 % | TEMPERATURE: 98 F | RESPIRATION RATE: 18 BRPM

## 2017-07-24 DIAGNOSIS — I50.9 HEART FAILURE, UNSPECIFIED: ICD-10-CM

## 2017-07-24 DIAGNOSIS — K92.2 GASTROINTESTINAL HEMORRHAGE, UNSPECIFIED: ICD-10-CM

## 2017-07-24 DIAGNOSIS — Z95.811 PRESENCE OF HEART ASSIST DEVICE: Chronic | ICD-10-CM

## 2017-07-24 LAB
ALBUMIN SERPL ELPH-MCNC: 3.5 G/DL — SIGNIFICANT CHANGE UP (ref 3.3–5)
ALP SERPL-CCNC: 150 U/L — HIGH (ref 40–120)
ALT FLD-CCNC: 19 U/L RC — SIGNIFICANT CHANGE UP (ref 10–45)
ANION GAP SERPL CALC-SCNC: 12 MMOL/L — SIGNIFICANT CHANGE UP (ref 5–17)
APTT BLD: 32.4 SEC — SIGNIFICANT CHANGE UP (ref 27.5–37.4)
AST SERPL-CCNC: 26 U/L — SIGNIFICANT CHANGE UP (ref 10–40)
BILIRUB SERPL-MCNC: 0.6 MG/DL — SIGNIFICANT CHANGE UP (ref 0.2–1.2)
BLD GP AB SCN SERPL QL: NEGATIVE — SIGNIFICANT CHANGE UP
BUN SERPL-MCNC: 24 MG/DL — HIGH (ref 7–23)
CALCIUM SERPL-MCNC: 8.6 MG/DL — SIGNIFICANT CHANGE UP (ref 8.4–10.5)
CHLORIDE SERPL-SCNC: 103 MMOL/L — SIGNIFICANT CHANGE UP (ref 96–108)
CO2 SERPL-SCNC: 21 MMOL/L — LOW (ref 22–31)
CREAT SERPL-MCNC: 0.87 MG/DL — SIGNIFICANT CHANGE UP (ref 0.5–1.3)
GLUCOSE SERPL-MCNC: 89 MG/DL — SIGNIFICANT CHANGE UP (ref 70–99)
HCT VFR BLD CALC: 24 % — LOW (ref 39–50)
HGB BLD-MCNC: 7.8 G/DL — LOW (ref 13–17)
INR BLD: 2.01 RATIO — HIGH (ref 0.88–1.16)
LDH SERPL L TO P-CCNC: 242 U/L — SIGNIFICANT CHANGE UP (ref 50–242)
MCHC RBC-ENTMCNC: 30.6 PG — SIGNIFICANT CHANGE UP (ref 27–34)
MCHC RBC-ENTMCNC: 32.4 GM/DL — SIGNIFICANT CHANGE UP (ref 32–36)
MCV RBC AUTO: 94.3 FL — SIGNIFICANT CHANGE UP (ref 80–100)
PLATELET # BLD AUTO: 315 K/UL — SIGNIFICANT CHANGE UP (ref 150–400)
POTASSIUM SERPL-MCNC: 4.7 MMOL/L — SIGNIFICANT CHANGE UP (ref 3.5–5.3)
POTASSIUM SERPL-SCNC: 4.7 MMOL/L — SIGNIFICANT CHANGE UP (ref 3.5–5.3)
PROT SERPL-MCNC: 7 G/DL — SIGNIFICANT CHANGE UP (ref 6–8.3)
PROTHROM AB SERPL-ACNC: 22.2 SEC — HIGH (ref 9.8–12.7)
RBC # BLD: 2.55 M/UL — LOW (ref 4.2–5.8)
RBC # FLD: 18.4 % — HIGH (ref 10.3–14.5)
RH IG SCN BLD-IMP: POSITIVE — SIGNIFICANT CHANGE UP
SODIUM SERPL-SCNC: 136 MMOL/L — SIGNIFICANT CHANGE UP (ref 135–145)
WBC # BLD: 13.9 K/UL — HIGH (ref 3.8–10.5)
WBC # FLD AUTO: 13.9 K/UL — HIGH (ref 3.8–10.5)

## 2017-07-24 PROCEDURE — 93750 INTERROGATION VAD IN PERSON: CPT

## 2017-07-24 PROCEDURE — 99223 1ST HOSP IP/OBS HIGH 75: CPT | Mod: 25,GC

## 2017-07-24 PROCEDURE — 90832 PSYTX W PT 30 MINUTES: CPT

## 2017-07-24 RX ORDER — SPIRONOLACTONE 25 MG/1
25 TABLET, FILM COATED ORAL DAILY
Qty: 0 | Refills: 0 | Status: DISCONTINUED | OUTPATIENT
Start: 2017-07-24 | End: 2017-07-31

## 2017-07-24 RX ORDER — PANTOPRAZOLE SODIUM 20 MG/1
40 TABLET, DELAYED RELEASE ORAL
Qty: 0 | Refills: 0 | Status: DISCONTINUED | OUTPATIENT
Start: 2017-07-24 | End: 2017-07-24

## 2017-07-24 RX ORDER — MEXILETINE HYDROCHLORIDE 150 MG/1
150 CAPSULE ORAL
Qty: 0 | Refills: 0 | Status: DISCONTINUED | OUTPATIENT
Start: 2017-07-24 | End: 2017-07-28

## 2017-07-24 RX ORDER — DOCUSATE SODIUM 100 MG
100 CAPSULE ORAL THREE TIMES A DAY
Qty: 0 | Refills: 0 | Status: DISCONTINUED | OUTPATIENT
Start: 2017-07-24 | End: 2017-07-31

## 2017-07-24 RX ORDER — FOLIC ACID 0.8 MG
1 TABLET ORAL DAILY
Qty: 0 | Refills: 0 | Status: DISCONTINUED | OUTPATIENT
Start: 2017-07-24 | End: 2017-07-31

## 2017-07-24 RX ORDER — ASCORBIC ACID 60 MG
500 TABLET,CHEWABLE ORAL DAILY
Qty: 0 | Refills: 0 | Status: DISCONTINUED | OUTPATIENT
Start: 2017-07-24 | End: 2017-07-31

## 2017-07-24 RX ORDER — FUROSEMIDE 40 MG
20 TABLET ORAL DAILY
Qty: 0 | Refills: 0 | Status: DISCONTINUED | OUTPATIENT
Start: 2017-07-24 | End: 2017-07-31

## 2017-07-24 RX ORDER — CARVEDILOL PHOSPHATE 80 MG/1
6.25 CAPSULE, EXTENDED RELEASE ORAL EVERY 12 HOURS
Qty: 0 | Refills: 0 | Status: DISCONTINUED | OUTPATIENT
Start: 2017-07-24 | End: 2017-07-31

## 2017-07-24 RX ORDER — ACETAMINOPHEN 500 MG
650 TABLET ORAL EVERY 6 HOURS
Qty: 0 | Refills: 0 | Status: DISCONTINUED | OUTPATIENT
Start: 2017-07-24 | End: 2017-07-31

## 2017-07-24 RX ORDER — SODIUM CHLORIDE 9 MG/ML
3 INJECTION INTRAMUSCULAR; INTRAVENOUS; SUBCUTANEOUS EVERY 8 HOURS
Qty: 0 | Refills: 0 | Status: DISCONTINUED | OUTPATIENT
Start: 2017-07-24 | End: 2017-07-31

## 2017-07-24 RX ORDER — QUETIAPINE FUMARATE 200 MG/1
50 TABLET, FILM COATED ORAL AT BEDTIME
Qty: 0 | Refills: 0 | Status: DISCONTINUED | OUTPATIENT
Start: 2017-07-24 | End: 2017-07-31

## 2017-07-24 RX ORDER — COLCHICINE 0.6 MG
0.6 TABLET ORAL DAILY
Qty: 0 | Refills: 0 | Status: DISCONTINUED | OUTPATIENT
Start: 2017-07-24 | End: 2017-07-31

## 2017-07-24 RX ORDER — HYDROCORTISONE 1 %
1 OINTMENT (GRAM) TOPICAL THREE TIMES A DAY
Qty: 0 | Refills: 0 | Status: DISCONTINUED | OUTPATIENT
Start: 2017-07-24 | End: 2017-07-31

## 2017-07-24 RX ORDER — AMIODARONE HYDROCHLORIDE 400 MG/1
200 TABLET ORAL DAILY
Qty: 0 | Refills: 0 | Status: DISCONTINUED | OUTPATIENT
Start: 2017-07-24 | End: 2017-07-31

## 2017-07-24 RX ORDER — DIPHENHYDRAMINE HCL 50 MG
25 CAPSULE ORAL EVERY 4 HOURS
Qty: 0 | Refills: 0 | Status: DISCONTINUED | OUTPATIENT
Start: 2017-07-24 | End: 2017-07-31

## 2017-07-24 RX ORDER — PANTOPRAZOLE SODIUM 20 MG/1
40 TABLET, DELAYED RELEASE ORAL
Qty: 0 | Refills: 0 | Status: DISCONTINUED | OUTPATIENT
Start: 2017-07-24 | End: 2017-07-31

## 2017-07-24 RX ADMIN — Medication 100 MILLIGRAM(S): at 21:37

## 2017-07-24 RX ADMIN — PANTOPRAZOLE SODIUM 40 MILLIGRAM(S): 20 TABLET, DELAYED RELEASE ORAL at 18:40

## 2017-07-24 RX ADMIN — Medication 100 MILLIGRAM(S): at 13:21

## 2017-07-24 RX ADMIN — Medication 1 APPLICATION(S): at 21:38

## 2017-07-24 RX ADMIN — CARVEDILOL PHOSPHATE 6.25 MILLIGRAM(S): 80 CAPSULE, EXTENDED RELEASE ORAL at 18:37

## 2017-07-24 RX ADMIN — SODIUM CHLORIDE 3 MILLILITER(S): 9 INJECTION INTRAMUSCULAR; INTRAVENOUS; SUBCUTANEOUS at 21:38

## 2017-07-24 RX ADMIN — Medication 500 MILLIGRAM(S): at 13:21

## 2017-07-24 RX ADMIN — Medication 1 MILLIGRAM(S): at 13:21

## 2017-07-24 RX ADMIN — AMIODARONE HYDROCHLORIDE 200 MILLIGRAM(S): 400 TABLET ORAL at 21:37

## 2017-07-24 RX ADMIN — QUETIAPINE FUMARATE 50 MILLIGRAM(S): 200 TABLET, FILM COATED ORAL at 21:37

## 2017-07-24 RX ADMIN — SODIUM CHLORIDE 3 MILLILITER(S): 9 INJECTION INTRAMUSCULAR; INTRAVENOUS; SUBCUTANEOUS at 13:22

## 2017-07-24 RX ADMIN — MEXILETINE HYDROCHLORIDE 150 MILLIGRAM(S): 150 CAPSULE ORAL at 18:36

## 2017-07-24 RX ADMIN — Medication 1 APPLICATION(S): at 13:29

## 2017-07-24 RX ADMIN — Medication 0.6 MILLIGRAM(S): at 13:21

## 2017-07-24 NOTE — H&P ADULT - PROBLEM SELECTOR PLAN 1
1. Admit to 2 [facility] Telemetry Floor   2. GI consult called and appreciated   3. Clear Diet   4. CBC / CMP/ PT/ INR/ LDH  5. Hold AC therapy Coumadin for tonight   6. Activity as tolerated   7. Start Protonix 40 mg IV BID   8. Type and Screen   9. NPO after midnight for EGD with Dr. James   10. D/C Plan pending PT evaluation once medically 1. Admit to 2 [facility] Telemetry Floor   2. GI consult called and appreciated   3. Clear Diet   4. CBC / CMP/ PT/ INR/ LDH  5. Hold AC therapy Coumadin for tonight   6. Activity as tolerated   7. Start Protonix 40 mg IV BID   8. Type and Screen   9. NPO after midnight for EGD with Dr. James   10. D/C Plan pending PT evaluation once medically  11. Transfuse with PRBC x 2

## 2017-07-24 NOTE — H&P ADULT - NSHPPHYSICALEXAM_GEN_ALL_CORE
PHYSICAL EXAM  Vital Signs Last 24 Hrs  T(C): 36.9 (24 Jul 2017 15:00), Max: 36.9 (24 Jul 2017 15:00)  T(F): 98.4 (24 Jul 2017 15:00), Max: 98.4 (24 Jul 2017 15:00)  HR: 94 (24 Jul 2017 15:00) (94 - 99)  BP: --  BP(mean): 94 (24 Jul 2017 15:00) (94 - 110)  RR: 18 (24 Jul 2017 15:00) (18 - 18)  SpO2: 100% (24 Jul 2017 15:00) (100% - 100%)    General: Well nourished, well developed, no acute distress.                                                         Neuro: Normal exam oriented to person/place & time with no focal motor or sensory  deficits.                    Eyes: Normal exam of conjunctiva & lids, pupils equally reactive.   ENT: Normal exam of nasal/oral mucosa with absence of cyanosis.   Neck: Normal exam of jugular veins, trachea & thyroid.   Chest: Normal lung exam with good air movement absence of wheezes, rales, or rhonchi:                                                                          CV:  Auscultation: normal [ ] S3[ ] S4[ ] Irregular [ ] Rub[ ] Clicks[ ]  Murmurs none:[ ]systolic [ ]  diastolic [ ] holosystolic [ ]  Carotids: No Bruits[ ] Other____________ Abdominal Aorta: normal [ ] nonpalpable[ ]                                                                         GI: Normal exam of abdomen, liver & spleen with no noted masses or tenderness.  (+) BS X 4 Quadrants, Nontender / Non Distended.                                                                                             Extremities: Normal no evidence of cyanosis or deformity Edema: none[ ]trace[ ]1+[ ]2+[ ]3+[ ]4+[ ]  Lower Extremity Pulses: Right[ ] Left[ ]Varicosities[ ]  SKIN : Normal exam to inspection & palation. PHYSICAL EXAM  Vital Signs Last 24 Hrs  T(C): 36.9 (24 Jul 2017 15:00), Max: 36.9 (24 Jul 2017 15:00)  T(F): 98.4 (24 Jul 2017 15:00), Max: 98.4 (24 Jul 2017 15:00)  HR: 94 (24 Jul 2017 15:00) (94 - 99)  BP: --  BP(mean): 94 (24 Jul 2017 15:00) (94 - 110)  RR: 18 (24 Jul 2017 15:00) (18 - 18)  SpO2: 100% (24 Jul 2017 15:00) (100% - 100%)    General: WN/WD NAD  Neurology: A&Ox3, nonfocal, RUVALCABA x 4  Head:  Normocephalic, atraumatic  ENT:  Mucosa moist, no ulcerations  Neck:  Supple, no sinuses or palpable masses  Lymphatic:  No palpable cervical, supraclavicular, axillary or inguinal adenopathy  Respiratory: CTA B/L  CV: (+) LVAD Hum   Abdominal: Soft, NT, ND no palpable mass  MSK: No edema, + peripheral pulses, FROM all 4 extremity  Incisions: MSI healed and intact, sternum stable, no erythema or drainage; RLQ Drive line site C/D/I

## 2017-07-24 NOTE — PHYSICAL THERAPY INITIAL EVALUATION ADULT - LIVES WITH, PROFILE
pt reports he lives in house with brother, 3 steps to entrance.  Pt reports he intends to sleep in living room on first floor.  Pt admitted from rehab, reports he was ambulating with assist x1 & no longer required chair follow, had not yet negotiated steps.

## 2017-07-24 NOTE — CONSULT NOTE ADULT - SUBJECTIVE AND OBJECTIVE BOX
History of Present Illness:  Mr. Yoseph Baez is a 67 year old man with PMHx of NICM  s/p 17 HM2 LVAD. His post operative course was prolonged 2/2 GIB and underwent EGD which revealed non-bleeding small superficial gastric ulcer. 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 Patient also underwent extensive LVAD education and was discharged to [facility] rehab on . He is admitted again  from rehab facility with c/o melena x 3 days. Found to have positive stool guaiac, and anemic with an H&H 6.9 and 20.8 Transfused on  with 2 unit PRBC's. Denies abdominal pain, N/V/D, or bloody stools at this time.  Readmitted for further work up and medical management.    Medications:  sodium chloride 0.9% lock flush 3 milliLiter(s) IV Push every 8 hours  spironolactone 25 milliGRAM(s) Oral daily  amiodarone    Tablet 200 milliGRAM(s) Oral daily  acetaminophen   Tablet 650 milliGRAM(s) Oral every 6 hours PRN  mexiletine 150 milliGRAM(s) Oral two times a day  diphenhydrAMINE   Capsule 25 milliGRAM(s) Oral every 4 hours PRN  colchicine 0.6 milliGRAM(s) Oral daily  QUEtiapine 50 milliGRAM(s) Oral at bedtime  carvedilol 6.25 milliGRAM(s) Oral every 12 hours  hydrocortisone 1% Cream 1 Application(s) Topical three times a day  furosemide    Tablet 20 milliGRAM(s) Oral daily  docusate sodium 100 milliGRAM(s) Oral three times a day  folic acid 1 milliGRAM(s) Oral daily  ascorbic acid 500 milliGRAM(s) Oral daily  pantoprazole  Injectable 40 milliGRAM(s) IV Push two times a day    Vitals:  T(C): 36.6 (17 @ 19:00), Max: 36.9 (17 @ 15:00)  HR: 86 (17 @ 19:00) (85 - 99)  BP: --  BP(mean): 90 (17 @ 19:00) (90 - 110)  ABP: --  ABP(mean): --  RR: 18 (17 @ 19:00) (18 - 18)  SpO2: 100% (17 @ 19:00) (100% - 100%)  Wt(kg): --      Daily Height in cm: 172.72 (2017 11:14)    Daily Weight in k.8 (2017 11:14)    Weight (kg): 68.8 ( @ 11:14)    I&O's Summary    2017 07:01  -  2017 19:59  --------------------------------------------------------  IN: 320 mL / OUT: 600 mL / NET: -280 mL    Physical Exam:  Appearance: No Acute Distress  HEENT: No JVD  Cardiovascular: Normal S1 S2, No murmurs/rubs/gallops  Respiratory: Clear to auscultation bilaterally  Gastrointestinal: Soft, Non-tender	  Skin: No cyanosis	  Neurologic: Non-focal  Extremities: No LE edema  Psychiatry: A & O x 3, Mood & affect appropriate    LVAD Interrogation:  Pump Flow: 5  Pump Speed: 9200  Pulse Index: 6  Pump Power:  VAD Events: No PI or power events  Driveline evaluation:    Programming Changes: No changes made    Labs:                        7.8    13.9  )-----------( 315      ( 2017 12:35 )             24.0         136  |  103  |  24<H>  ----------------------------<  89  4.7   |  21<L>  |  0.87    Ca    8.6      2017 12:35    TPro  7.0  /  Alb  3.5  /  TBili  0.6  /  DBili  x   /  AST  26  /  ALT  19  /  AlkPhos  150<H>      PT/INR - ( 2017 12:35 )   PT: 22.2 sec;   INR: 2.01 ratio         PTT - ( 2017 12:35 )  PTT:32.4 sec      Lactate Dehydrogenase, Serum: 242 U/L ( @ 12:35)          TELEMETRY:    [ ] Echocardiogram:

## 2017-07-24 NOTE — CONSULT NOTE ADULT - ASSESSMENT
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 LVAD working well without PI/power events. MAP 90.   -would transfuse 2 units   -IV PPI BID   -hold coumadin  -GI recommendations appreciated, plan for EGD tomorrow   -given MAPs, continue home carvedilol and furosemide   -c/w antiarrhythmics    Please page me at  for any further questions up till 5 pm, [phone] for covering on call fellow after 5 pm

## 2017-07-24 NOTE — CONSULT NOTE ADULT - ASSESSMENT
1. Melena, anemia - most likely secondary to angioectasia of small bowel though PUD is in differential  2. HFrEF, NICM, s/p LVAD  3. On coumadin    - High dose IV PPI  - Serial CBC, transfuse to keep Hb >7  - Clear liquid diet today, NPO after midnight  - Maintain large bore IV access and active T&S  - Anticoagulation per cardiology  - Plan for EGD tomorrow 1. Melena, anemia - most likely secondary to angioectasia of small bowel though PUD is in differential  2. HFrEF, NICM, s/p LVAD  3. On coumadin    - High dose IV PPI  - Serial CBC, transfuse to keep Hb >7  - Clear liquid diet today, NPO after midnight  - Maintain large bore IV access and active T&S  - Anticoagulation per cardiology  - Plan for EGD + video capsule endoscopy tomorrow

## 2017-07-24 NOTE — H&P ADULT - PROBLEM SELECTOR PLAN 2
1. Continue with current medication regimen   2. CHF team consulted   3. Continue with Amiodarone 200 mg PO daily  4. Continue with Mexiletine 150 mg PO BID   5. Continue with Coreg 6.25 mg PO BID

## 2017-07-24 NOTE — CONSULT NOTE ADULT - ATTENDING COMMENTS
Recent admission for LVAD implant complicated by prolonged hospitalization for GIB found to be 2/2 AVMs s/p APC with push enteroscopy on 7/6. Upon discharge, patient noted to have melenic stools with worsening anemia with possible orthostatic symptoms. Exam notable for elevated MAPs  otherwise appears euvolemic. LVAD interrogated without events. Tele with short runs of NSVT. Suspicion ongoing GIB is 2/2 AVMs. Transfuse 2 U PRBC and continue IV PPI bid. Will hold off on treating BP until GIB resolves. Serial CBCs. Hold coumadin tonight. May consider octreotide to prevent further GIB in future.

## 2017-07-24 NOTE — PHYSICAL THERAPY INITIAL EVALUATION ADULT - PERTINENT HX OF CURRENT PROBLEM, REHAB EVAL
Patient is a 67y old  Male who presents with a chief complaint of " I have bloody stools x 1 day" (24 Jul 2017 11:01)

## 2017-07-24 NOTE — H&P ADULT - PMH
CHF (Congestive Heart Failure)    GIB (gastrointestinal bleeding)    HTN    Non-Ischemic Cardiomyopathy    PAF (paroxysmal atrial fibrillation)  on xarelto  SVT (Supraventricular Tachycardia)    Ventricular fibrillation  s/p AICD

## 2017-07-24 NOTE — H&P ADULT - NSHPLABSRESULTS_GEN_ALL_CORE
LABS:                        7.8    13.9  )-----------( 315      ( 24 Jul 2017 12:35 )             24.0     07-24    136  |  103  |  24<H>  ----------------------------<  89  4.7   |  21<L>  |  0.87    Ca    8.6      24 Jul 2017 12:35    TPro  7.0  /  Alb  3.5  /  TBili  0.6  /  DBili  x   /  AST  26  /  ALT  19  /  AlkPhos  150<H>  07-24    PT/INR - ( 24 Jul 2017 12:35 )   PT: 22.2 sec;   INR: 2.01 ratio         PTT - ( 24 Jul 2017 12:35 )  PTT:32.4 sec

## 2017-07-24 NOTE — H&P ADULT - NSHPSOCIALHISTORY_GEN_ALL_CORE
SOCIAL HISTORY:  Smoker: [ ] Yes  [X] No        PACK YEARS:                         WHEN QUIT?  ETOH use: [ ] Yes  [X ] No              FREQUENCY / QUANTITY:  Ilicit Drug use:  [ ] Yes  [X ] No  Occupation: disabled   Live with: Came from rehab [place]   Assist device use: None     Relevant Family History  FAMILY HISTORY:  No pertinent family history in first degree relatives

## 2017-07-24 NOTE — PROGRESS NOTE ADULT - ASSESSMENT
Coping well with readmission to hosptial for suspicion of another GIB.  Reports having a positive experience at rehab; was fully engaged in physcial therapy and felt fully supported by staff.  Understands reasons for readmission to hosptial "they're going to do a test (EGD) tomorrow."  Appetite good.  Sleeping well with nap during day.  Mood upbeat despite readmission.   Open to discussing his feelings; receptive to support and validation.  Feeling confident with LVAD equipment; close friend Tahira and brother Nawaf doing dressing changes.       DX:  Systolic heart failure; r/o Adjustment disorder with anxiety/depression     Recommendations:   Behavioral Cardiology will continue to follow

## 2017-07-24 NOTE — PHYSICAL THERAPY INITIAL EVALUATION ADULT - RANGE OF MOTION EXAMINATION, REHAB EVAL
bilateral upper extremity ROM was WFL (within functional limits)/bilateral lower extremity ROM was WFL (within functional limits)/RLE genu varus

## 2017-07-24 NOTE — CONSULT NOTE ADULT - SUBJECTIVE AND OBJECTIVE BOX
Chief Complaint:  Patient is a 67y old  Male who presents with a chief complaint of " I have bloody stools x 1 day" (2017 11:01)      HPI: 67M    Allergies:  No Known Allergies      Home Medications:    Hospital Medications:  sodium chloride 0.9% lock flush 3 milliLiter(s) IV Push every 8 hours  spironolactone 25 milliGRAM(s) Oral daily  amiodarone    Tablet 200 milliGRAM(s) Oral daily  acetaminophen   Tablet 650 milliGRAM(s) Oral every 6 hours PRN  mexiletine 150 milliGRAM(s) Oral two times a day  diphenhydrAMINE   Capsule 25 milliGRAM(s) Oral every 4 hours PRN  colchicine 0.6 milliGRAM(s) Oral daily  QUEtiapine 50 milliGRAM(s) Oral at bedtime  carvedilol 6.25 milliGRAM(s) Oral every 12 hours  hydrocortisone 1% Cream 1 Application(s) Topical three times a day  furosemide    Tablet 20 milliGRAM(s) Oral daily  docusate sodium 100 milliGRAM(s) Oral three times a day  folic acid 1 milliGRAM(s) Oral daily  ascorbic acid 500 milliGRAM(s) Oral daily  pantoprazole  Injectable 40 milliGRAM(s) IV Push two times a day      PMHX/PSHX:  GIB (gastrointestinal bleeding)  H/O prior ablation treatment  Ventricular fibrillation  PAF (paroxysmal atrial fibrillation)  Non-Ischemic Cardiomyopathy  SVT (Supraventricular Tachycardia)  HTN  CHF (Congestive Heart Failure)  LVAD (left ventricular assist device) present  Status post left hip replacement  Hypertension  Hypertension  History of Prior Ablation Treatment  AICD (Automatic Cardioverter/Defibrillator) Present      Family history:  No pertinent family history in first degree relatives      Social History:     Review of systems: Negative, except as otherwise noted above      PHYSICAL EXAM:   Vital Signs:  Vital Signs Last 24 Hrs  T(C): 36.4 (2017 10:21), Max: 36.4 (2017 10:21)  T(F): 97.6 (2017 10:21), Max: 97.6 (2017 10:21)  HR: 99 (2017 10:21) (99 - 99)  BP: --  BP(mean): 110 (2017 10:21) (110 - 110)  RR: 18 (2017 10:21) (18 - 18)  SpO2: 100% (2017 10:21) (100% - 100%)  Daily     Daily Weight in k.8 (2017 11:14)    GENERAL:  No acute distress  HEENT:  Anicteric, no thrush  CHEST:  Non-labored breathing, lungs clear b/l  HEART:  +s1, s2 heart sounds, no murmurs  ABDOMEN:    EXTREMITIES:  warm and well perfused, no edema  SKIN:    NEURO:          LABS:  CBC Full  -  ( 2017 12:35 )  WBC Count : 13.9 K/uL  Hemoglobin : 7.8 g/dL  Hematocrit : 24.0 %  Platelet Count - Automated : 315 K/uL  Mean Cell Volume : 94.3 fl  Auto Neutrophil # :   Auto Neutrophil % :     07-24 @ 12:35  Na 136 mmol/L  K 4.7 mmol/L  Cl 103 mmol/L  CO2 21 mmol/L  BUN 24 mg/dL  Creat 0.87 mg/dL  Glucose 89 mg/dL  Ca 8.6 mg/dL    Total protein 7.0 g/dL  Albumin 3.5 g/dL  T bili 0.6 mg/dL  Alk phos 150 U/L  AST 26 U/L  ALT 19 U/L RC    PT/INR - ( 2017 12:35 )   PT: 22.2 sec;   INR: 2.01 ratio         PTT - ( 2017 12:35 )  PTT:32.4 sec      Imaging: Chief Complaint:  Patient is a 67y old  Male who presents with a chief complaint of " I have bloody stools x 1 day" (2017 11:01)      HPI: 67M with HFrEF and NICM s/p LVAD on coumadin, AFib, HTN, recent GI bleed, presents from [facility] with melena and anemia.     Patient was recently discharged from [facility] after an admission for GI bleed required PRBC. He had an EGD 7/3/17 that revealed a superficial gastric ulcer (unlikely source of anemia), followed by a video capsule endoscopy notable for angioectasias. He subsequently had a push enteroscopy on 17 with APC and clipping of 2 angioectasias in proximal jejunum. He was discharged on  and returns now, 6 days later, complaining of black stool. Patient states he noted 3 days of formed, black stool, occurring once daily. He denies abdominal pain, nausea, vomiting, hematemesis, hematochezia, fever, chills. He takes coumadin but denies NSAID use. He denies chest pain, dizziness, dyspnea, or syncope.     Allergies:  No Known Allergies      Hospital Medications:  sodium chloride 0.9% lock flush 3 milliLiter(s) IV Push every 8 hours  spironolactone 25 milliGRAM(s) Oral daily  amiodarone    Tablet 200 milliGRAM(s) Oral daily  acetaminophen   Tablet 650 milliGRAM(s) Oral every 6 hours PRN  mexiletine 150 milliGRAM(s) Oral two times a day  diphenhydrAMINE   Capsule 25 milliGRAM(s) Oral every 4 hours PRN  colchicine 0.6 milliGRAM(s) Oral daily  QUEtiapine 50 milliGRAM(s) Oral at bedtime  carvedilol 6.25 milliGRAM(s) Oral every 12 hours  hydrocortisone 1% Cream 1 Application(s) Topical three times a day  furosemide    Tablet 20 milliGRAM(s) Oral daily  docusate sodium 100 milliGRAM(s) Oral three times a day  folic acid 1 milliGRAM(s) Oral daily  ascorbic acid 500 milliGRAM(s) Oral daily  pantoprazole  Injectable 40 milliGRAM(s) IV Push two times a day      PMHX/PSHX:  GIB (gastrointestinal bleeding)  H/O prior ablation treatment  Ventricular fibrillation  PAF (paroxysmal atrial fibrillation)  Non-Ischemic Cardiomyopathy  SVT (Supraventricular Tachycardia)  HTN  CHF (Congestive Heart Failure)  LVAD (left ventricular assist device) present  Status post left hip replacement  Hypertension  Hypertension  History of Prior Ablation Treatment  AICD (Automatic Cardioverter/Defibrillator) Present      Family history:  No pertinent family history in first degree relatives      Social History: as above    Review of systems: Negative, except as otherwise noted above      PHYSICAL EXAM:   Vital Signs:  Vital Signs Last 24 Hrs  T(C): 36.4 (2017 10:21), Max: 36.4 (2017 10:21)  T(F): 97.6 (2017 10:21), Max: 97.6 (2017 10:21)  HR: 99 (2017 10:21) (99 - 99)  BP: --  BP(mean): 110 (2017 10:21) (110 - 110)  RR: 18 (2017 10:21) (18 - 18)  SpO2: 100% (2017 10:21) (100% - 100%)  Daily     Daily Weight in k.8 (2017 11:14)    GENERAL:  No acute distress  HEENT:  Anicteric, no thrush  CHEST:  Non-labored breathing, lungs clear b/l  HEART:  +s1, s2 heart sounds  ABDOMEN:  soft, nontender to palpation, no rebound/guarding, +bs  RECTAL: brown stool, no blood or melena  EXTREMITIES:  warm and well perfused, no edema  SKIN:  Warm, no edema  NEURO:  Awake interactive following commands        LABS:  CBC Full  -  ( 2017 12:35 )  WBC Count : 13.9 K/uL  Hemoglobin : 7.8 g/dL  Hematocrit : 24.0 %  Platelet Count - Automated : 315 K/uL  Mean Cell Volume : 94.3 fl  Auto Neutrophil # :   Auto Neutrophil % :     Hemoglobin: 7.8 g/dL ( @ 12:35)  Hemoglobin: 6.9 g/dL ( @ 15:58)  Hemoglobin: 6.3 g/dL ( @ 09:04)  Hemoglobin: 7.1 g/dL ( @ 11:10)  Hemoglobin: 6.5 g/dL ( @ 08:58)  Hemoglobin: 6.6 g/dL ( @ 13:16)  Hemoglobin: 5.2 g/dL ( @ 10:27)       @ 12:35  Na 136 mmol/L  K 4.7 mmol/L  Cl 103 mmol/L  CO2 21 mmol/L  BUN 24 mg/dL  Creat 0.87 mg/dL  Glucose 89 mg/dL  Ca 8.6 mg/dL    Total protein 7.0 g/dL  Albumin 3.5 g/dL  T bili 0.6 mg/dL  Alk phos 150 U/L  AST 26 U/L  ALT 19 U/L RC    PT/INR - ( 2017 12:35 )   PT: 22.2 sec;   INR: 2.01 ratio         PTT - ( 2017 12:35 )  PTT:32.4 sec

## 2017-07-24 NOTE — PROGRESS NOTE ADULT - SUBJECTIVE AND OBJECTIVE BOX
Behavioral Cardiology Progress Note     HPI:  68 y/o male with advanced heart failure (NICM); s/p LVAD implant (6/12/17), course complicated by GIB found to be 2/2 AVMs s/p APC with push enteroscopy (7/6/17), discharged to [facility] (7/17/17); admitted from rehab with c/o melena x 3 days with suspicion of another GIB. Readmitted for further work up and medical management.    Behavioral Health Assessment:     Mood:  "I'm back here."         Current stressors:   Post-surgical recovery   Readmission to hospital   Adjustment to LVAD     Support system/family support: Good support from family and close friend      Coping strategies: Talking with family and staff, watching TV; thinking about his granddaughter     Understanding of medical illness and treatment plan: Good understanding of LVAD education and management.       MSE: Pt seen resting in bed.  A&Ox3.  Well related with good eye contact.  Thought process goal directed.  No abnormal thought content; denies s/i.  Mood "good"  Affect full range.  Insight and judgment adequate.

## 2017-07-24 NOTE — H&P ADULT - NSHPREVIEWOFSYSTEMS_GEN_ALL_CORE
Review of Systems  GENERAL:  Fevers[] chills[] sweats[] fatigue[] weight loss[] weight gain []                                        NEURO:  parathesias[] seizures []  syncope []  confusion []                                                                                  EYES: glasses[]  blurry vision[]  discharge[] pain[] glaucoma []                                                                            ENMT:  difficulty hearing []  vertigo[]  dysphagia[] epistaxis[] recent dental work []                                      CV:  chest pain[] palpitations[] LOBATO [] diaphoresis [] edema[]                                                                                             RESPIRATORY:  wheezing[] SOB[] cough [] sputum[] hemoptysis[]                                                                    GI:  nausea[]  vomiting []  diarrhea[] constipation [] melena []                                                                        : hematuria[ ]  dysuria[ ] urgency[] incontinence[]                                                                                              MUSKULOSKELETAL:  arthritis[ ]  joint swelling [ ] muscle weakness [ ]                                                                  SKIN/BREAST:  rash[ ] itching [ ]  hair loss[ ] masses[ ]                                                                                                PSYCH:  dementia [ ] depresion [ ] anxiety[ ]                                                                                                                  HEME/LYMPH:  bruises easily[ ] enlarged lymph nodes[ ] tender lymph nodes[ ]                                                 ENDOCRINE:  cold intolerance[ ] heat intolerance[ ] polydipsia[ ] Review of Systems  GENERAL:  Fevers[] chills[] sweats[] fatigue[] weight loss[] weight gain []                                        NEURO:  parathesias[] seizures []  syncope []  confusion []                                                                                  EYES: glasses[]  blurry vision[]  discharge[] pain[] glaucoma []                                                                            ENMT:  difficulty hearing []  vertigo[]  dysphagia[] epistaxis[] recent dental work []                                      CV:  chest pain[] palpitations[] LOBATO [] diaphoresis [] edema[]                                                                                             RESPIRATORY:  wheezing[] SOB[] cough [] sputum[] hemoptysis[]                                                                    GI:  nausea[]  vomiting []  diarrhea[] constipation [] melena [X]                                                                        : hematuria[ ]  dysuria[ ] urgency[] incontinence[]                                                                                              MUSKULOSKELETAL:  arthritis[ ]  joint swelling [ ] muscle weakness [X ]                                                                  SKIN/BREAST:  rash[ ] itching [ ]  hair loss[ ] masses[ ]                                                                                                PSYCH:  dementia [ ] depression [ ] anxiety[ ]                                                                                                                  HEME/LYMPH:  bruises easily[ ] enlarged lymph nodes[ ] tender lymph nodes[ ]                                                 ENDOCRINE:  cold intolerance[ ] heat intolerance[ ] polydipsia[ ]

## 2017-07-24 NOTE — H&P ADULT - HISTORY OF PRESENT ILLNESS
History of Present Illness:  67y Male          Allergies: No Known Allergies                                                                                       This is a 67y Male History of Present Illness  67 year old man with PMHx of CHF, NICM, s/p 6/12/17 HM2 LVAD post op prolonged 2/2 GIB s/p EGD finding: non-bleeding small superficial gastric ulcer, Small bowel capsule showing 2 non-bleeding angiodysplastic lesions in the jejunum s/p argon plasma coagulation (APC); Clips were placed. Patient discharged to [facility] rehab on 7/17. Now presents to [facility] on 7/24 from rehab facility with c/o melena x 1 day. Found to have positive stool guaiac, and anemic with an H&H 6.9 and 20.8 Transfused on 7/23 with 1 unit PRBC. Denies abdominal pain, N/V/D, or bloody stools at this time.  Readmitted for further work up and medical management. History of Present Illness        67 year old man with PMHx of CHF, NICM, s/p 6/12/17 HM2 LVAD post op prolonged 2/2 GIB s/p EGD finding: non-bleeding small superficial gastric ulcer, Small bowel capsule showing 2 non-bleeding angiodysplastic lesions in the jejunum s/p argon plasma coagulation (APC); Clips were placed. Patient discharged to [facility] rehab on 7/17. Now presents to [facility] on 7/24 from rehab facility with c/o melena x 1 day. Found to have positive stool guaiac, and anemic with an H&H 6.9 and 20.8 Transfused on 7/23 with 1 unit PRBC. Denies abdominal pain, N/V/D, or bloody stools at this time.  Readmitted for further work up and medical management. History of Present Illness        67 year old man with PMHx of CHF, NICM, s/p 6/12/17 HM2 LVAD post op prolonged 2/2 GIB s/p EGD finding: non-bleeding small superficial gastric ulcer, Small bowel capsule showing 2 non-bleeding angiodysplastic lesions in the jejunum s/p argon plasma coagulation (APC); Clips were placed. Patient discharged to [facility] rehab on 7/17. Now presents to [facility] on 7/24 from rehab facility with c/o melena x 1 day. Found to have positive stool guaiac, and anemic with an H&H 6.9 and 20.8 Transfused on 7/23 with 1 unit PRBC. Denies abdominal pain, N/V/D, or bloody stools at this time.  Readmitted for further work up and medical management. History of Present Illness    67 year old man with PMHx of CHF, NICM, s/p 6/12/17 HM2 LVAD post op prolonged 2/2 GIB s/p EGD finding: non-bleeding small superficial gastric ulcer, Small bowel capsule showing 2 non-bleeding angiodysplastic lesions in the jejunum s/p argon plasma coagulation (APC); Clips were placed. Patient discharged to [facility] rehab on 7/17. Now presents to [facility] on 7/24 from rehab facility with c/o melena x 3 days. Found to have positive stool guaiac, and anemic with an H&H 6.9 and 20.8 Transfused on 7/23 with 2 unit PRBC's. Denies abdominal pain, N/V/D, or bloody stools at this time.  Readmitted for further work up and medical management.

## 2017-07-25 DIAGNOSIS — K92.1 MELENA: ICD-10-CM

## 2017-07-25 DIAGNOSIS — Z95.811 PRESENCE OF HEART ASSIST DEVICE: ICD-10-CM

## 2017-07-25 LAB
ANION GAP SERPL CALC-SCNC: 13 MMOL/L — SIGNIFICANT CHANGE UP (ref 5–17)
BUN SERPL-MCNC: 20 MG/DL — SIGNIFICANT CHANGE UP (ref 7–23)
CALCIUM SERPL-MCNC: 8.7 MG/DL — SIGNIFICANT CHANGE UP (ref 8.4–10.5)
CHLORIDE SERPL-SCNC: 103 MMOL/L — SIGNIFICANT CHANGE UP (ref 96–108)
CO2 SERPL-SCNC: 20 MMOL/L — LOW (ref 22–31)
CREAT SERPL-MCNC: 0.92 MG/DL — SIGNIFICANT CHANGE UP (ref 0.5–1.3)
GLUCOSE SERPL-MCNC: 80 MG/DL — SIGNIFICANT CHANGE UP (ref 70–99)
HCT VFR BLD CALC: 29.3 % — LOW (ref 39–50)
HGB BLD-MCNC: 9.5 G/DL — LOW (ref 13–17)
INR BLD: 1.75 RATIO — HIGH (ref 0.88–1.16)
MCHC RBC-ENTMCNC: 30 PG — SIGNIFICANT CHANGE UP (ref 27–34)
MCHC RBC-ENTMCNC: 32.4 GM/DL — SIGNIFICANT CHANGE UP (ref 32–36)
MCV RBC AUTO: 92.6 FL — SIGNIFICANT CHANGE UP (ref 80–100)
PLATELET # BLD AUTO: 287 K/UL — SIGNIFICANT CHANGE UP (ref 150–400)
POTASSIUM SERPL-MCNC: 4.6 MMOL/L — SIGNIFICANT CHANGE UP (ref 3.5–5.3)
POTASSIUM SERPL-SCNC: 4.6 MMOL/L — SIGNIFICANT CHANGE UP (ref 3.5–5.3)
PROTHROM AB SERPL-ACNC: 19.3 SEC — HIGH (ref 9.8–12.7)
RBC # BLD: 3.16 M/UL — LOW (ref 4.2–5.8)
RBC # FLD: 17.3 % — HIGH (ref 10.3–14.5)
SODIUM SERPL-SCNC: 136 MMOL/L — SIGNIFICANT CHANGE UP (ref 135–145)
WBC # BLD: 11.2 K/UL — HIGH (ref 3.8–10.5)
WBC # FLD AUTO: 11.2 K/UL — HIGH (ref 3.8–10.5)

## 2017-07-25 PROCEDURE — 99232 SBSQ HOSP IP/OBS MODERATE 35: CPT | Mod: 25,GC

## 2017-07-25 PROCEDURE — 90832 PSYTX W PT 30 MINUTES: CPT

## 2017-07-25 PROCEDURE — 44366 SMALL BOWEL ENDOSCOPY: CPT | Mod: GC

## 2017-07-25 PROCEDURE — 99232 SBSQ HOSP IP/OBS MODERATE 35: CPT

## 2017-07-25 PROCEDURE — 93750 INTERROGATION VAD IN PERSON: CPT

## 2017-07-25 RX ADMIN — Medication 1 APPLICATION(S): at 06:03

## 2017-07-25 RX ADMIN — MEXILETINE HYDROCHLORIDE 150 MILLIGRAM(S): 150 CAPSULE ORAL at 06:01

## 2017-07-25 RX ADMIN — SODIUM CHLORIDE 3 MILLILITER(S): 9 INJECTION INTRAMUSCULAR; INTRAVENOUS; SUBCUTANEOUS at 06:00

## 2017-07-25 RX ADMIN — PANTOPRAZOLE SODIUM 40 MILLIGRAM(S): 20 TABLET, DELAYED RELEASE ORAL at 06:03

## 2017-07-25 RX ADMIN — SODIUM CHLORIDE 3 MILLILITER(S): 9 INJECTION INTRAMUSCULAR; INTRAVENOUS; SUBCUTANEOUS at 21:41

## 2017-07-25 RX ADMIN — Medication 20 MILLIGRAM(S): at 06:02

## 2017-07-25 RX ADMIN — Medication 0.6 MILLIGRAM(S): at 18:42

## 2017-07-25 RX ADMIN — Medication 100 MILLIGRAM(S): at 18:42

## 2017-07-25 RX ADMIN — Medication 100 MILLIGRAM(S): at 06:02

## 2017-07-25 RX ADMIN — SODIUM CHLORIDE 3 MILLILITER(S): 9 INJECTION INTRAMUSCULAR; INTRAVENOUS; SUBCUTANEOUS at 14:54

## 2017-07-25 RX ADMIN — QUETIAPINE FUMARATE 50 MILLIGRAM(S): 200 TABLET, FILM COATED ORAL at 21:42

## 2017-07-25 RX ADMIN — Medication 1 APPLICATION(S): at 21:43

## 2017-07-25 RX ADMIN — PANTOPRAZOLE SODIUM 40 MILLIGRAM(S): 20 TABLET, DELAYED RELEASE ORAL at 18:42

## 2017-07-25 RX ADMIN — CARVEDILOL PHOSPHATE 6.25 MILLIGRAM(S): 80 CAPSULE, EXTENDED RELEASE ORAL at 06:02

## 2017-07-25 RX ADMIN — SPIRONOLACTONE 25 MILLIGRAM(S): 25 TABLET, FILM COATED ORAL at 06:02

## 2017-07-25 RX ADMIN — Medication 100 MILLIGRAM(S): at 21:43

## 2017-07-25 RX ADMIN — AMIODARONE HYDROCHLORIDE 200 MILLIGRAM(S): 400 TABLET ORAL at 06:02

## 2017-07-25 RX ADMIN — CARVEDILOL PHOSPHATE 6.25 MILLIGRAM(S): 80 CAPSULE, EXTENDED RELEASE ORAL at 18:42

## 2017-07-25 RX ADMIN — Medication 1 MILLIGRAM(S): at 18:42

## 2017-07-25 RX ADMIN — MEXILETINE HYDROCHLORIDE 150 MILLIGRAM(S): 150 CAPSULE ORAL at 18:41

## 2017-07-25 RX ADMIN — Medication 500 MILLIGRAM(S): at 18:42

## 2017-07-25 NOTE — DIETITIAN INITIAL EVALUATION ADULT. - NUTRITION INTERVENTION
Feeding Assistance/Meals and Snack/Collaboration and Referral of Nutrition Care/Medical Food Supplements Nutrition Education

## 2017-07-25 NOTE — DIETITIAN INITIAL EVALUATION ADULT. - ETIOLOGY
history of lack of appetite and increased demand for nutrients Pt new to coumadin on previous admission

## 2017-07-25 NOTE — DIETITIAN INITIAL EVALUATION ADULT. - NS AS NUTRI INTERV COLLABORAT
Mild malnutrition sticker placed in chart, NP aware. RD remains available to monitor PO intake, Wt and labs./Collaboration with other providers

## 2017-07-25 NOTE — DIETITIAN INITIAL EVALUATION ADULT. - NS FNS REASON FOR WEIGHT CHANG
decreased po intake/Per previous RD note, Pt UBW prior to LVAD was ~160lbs, Pt had lost 16lbs down to 144.1. Pt currently 151.3lbs indicating possible 7lb wt gain. Pt previously Dx with moderate malnutrition.

## 2017-07-25 NOTE — PROGRESS NOTE ADULT - SUBJECTIVE AND OBJECTIVE BOX
Subjective Hello I am waiting for my test    VITAL SIGNS    Telemetry: SR 80s    Vital Signs Last 24 Hrs  T(C): 36.3 (17 @ 14:57), Max: 36.7 (17 @ 04:30)  T(F): 97.3 (17 @ 14:57), Max: 98.1 (17 @ 04:30)  HR: 86 (17 @ 14:57) (83 - 87)  RR: 18 (17 @ 14:57) (16 - 18)  SpO2: 100% (17 @ 14:57) (99% - 100%)   LVAD speed 9200  flow 4.1  Power 5.1  PI 6.9          @ 07:  -   @ 07:00  --------------------------------------------------------  IN: 320 mL / OUT: 1400 mL / NET: -1080 mL     @ 07:  -   @ 16:38  --------------------------------------------------------  IN: 0 mL / OUT: 850 mL / NET: -850 mL  Daily Weight in k.8 (2017 14:52)      CAPILLARY BLOOD GLUCOSE  Coumadin    [ ] YES          [x  ]      NO         Reason:  GIB/melena    PHYSICAL EXAM  Neurology: alert and oriented x 3, nonfocal, no gross deficits  CV :LVAD sounds NSR  Driveline CDI  Lungs:CTA  Abdomen: soft, nontender, nondistended, positive bowel sounds, last bowel movement   :voiding         Extremities B/L +DP       Physical Therapy Rec:   Home when medically stable  Discussed with Cardiothoracic Team at AM rounds.

## 2017-07-25 NOTE — PROGRESS NOTE ADULT - SUBJECTIVE AND OBJECTIVE BOX
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 He had an EGD 7/3/17 that revealed a superficial gastric ulcer (unlikely source of anemia), followed by a video capsule endoscopy notable for angioectasias. He subsequently had a push enteroscopy on 17 with APC and clipping of 2 angioectasias in proximal jejunum. He was discharged on  and returns now, 6 days later, complaining of black stool. Patient states he noted 3 days of formed, black stool, occurring once daily. He denies abdominal pain, nausea, vomiting, hematemesis, hematochezia, fever, chills. He takes coumadin but denies NSAID use. He denies chest pain, dizziness, dyspnea, or syncope.       Sedation by Anesthesia [x]     PAST MEDICAL & SURGICAL HISTORY:  GIB (gastrointestinal bleeding)  Ventricular fibrillation: s/p AICD  PAF (paroxysmal atrial fibrillation): on xarelto  Non-Ischemic Cardiomyopathy  SVT (Supraventricular Tachycardia)  HTN  CHF (Congestive Heart Failure)  LVAD (left ventricular assist device) present  Status post left hip replacement  History of Prior Ablation Treatment: for afib  AICD (Automatic Cardioverter/Defibrillator) Present: [employer] with 1 [employer] lead09- explanted and replaced with [employer] 2 leads on 09      PMH of Gastroparesis [ ]  Gastric Surgery [ ]  Gastric Outlet Obstruction [ ] no    Allergies    No Known Allergies    Latex allergy: [ ] yes [x] no    Medications:MEDICATIONS  (STANDING):  sodium chloride 0.9% lock flush 3 milliLiter(s) IV Push every 8 hours  spironolactone 25 milliGRAM(s) Oral daily  amiodarone    Tablet 200 milliGRAM(s) Oral daily  mexiletine 150 milliGRAM(s) Oral two times a day  colchicine 0.6 milliGRAM(s) Oral daily  QUEtiapine 50 milliGRAM(s) Oral at bedtime  carvedilol 6.25 milliGRAM(s) Oral every 12 hours  hydrocortisone 1% Cream 1 Application(s) Topical three times a day  furosemide    Tablet 20 milliGRAM(s) Oral daily  docusate sodium 100 milliGRAM(s) Oral three times a day  folic acid 1 milliGRAM(s) Oral daily  ascorbic acid 500 milliGRAM(s) Oral daily  pantoprazole  Injectable 40 milliGRAM(s) IV Push two times a day    MEDICATIONS  (PRN):  acetaminophen   Tablet 650 milliGRAM(s) Oral every 6 hours PRN Mild Pain (1 - 3)  diphenhydrAMINE   Capsule 25 milliGRAM(s) Oral every 4 hours PRN Rash and/or Itching      Smoking: [ ] yes  [x] no    AICD/PPM: [x] yes   [ ] no    Pertinent lab data:                        9.5    11.2  )-----------( 287      ( 2017 05:29 )             29.3         136  |  103  |  20  ----------------------------<  80  4.6   |  20<L>  |  0.92    Ca    8.7      2017 05:29    TPro  7.0  /  Alb  3.5  /  TBili  0.6  /  DBili  x   /  AST  26  /  ALT  19  /  AlkPhos  150<H>  0724    PT/INR - ( 2017 05:29 )   PT: 19.3 sec;   INR: 1.75 ratio         PTT - ( 2017 12:35 )  PTT:32.4 sec        Physical Examination:  Daily Height in cm: 172.72 (2017 11:14)    Daily Weight in k.8 (2017 11:14)  Vital Signs Last 24 Hrs  T(C): 36.4 (2017 09:01), Max: 36.9 (2017 15:00)  T(F): 97.5 (2017 09:01), Max: 98.4 (2017 15:00)  HR: 83 (2017 09:01) (83 - 94)  BP: --  BP(mean): 82 (2017 09:01) (82 - 94)  RR: 18 (2017 09:01) (16 - 18)  SpO2: 99% (2017 09:01) (99% - 100%)    BP:                 HR: 83   SPO2: 99%      Temperature: 36.4    Constitutional: NAD    HEENT: PERRLA, EOMI,       Neck:  No JVD    Respiratory: CTAB/L    Cardiovascular: S1 and S2    Gastrointestinal: BS+, soft, NT/ND    Extremities: No peripheral edema    Neurological: A/O x 3, no focal deficits    Psychiatric: Normal mood, normal affect    : No Rosas    Skin: No rashes    Comments:    ASA Class: I [ ]  II [ ]  III [ ]  IV [x]    The patient is a suitable candidate for the planned procedure unless box checked [ ]  No, explain: Pre-Endoscopy Evaluation      Referring Physician:  Ayaz Barr MD                               Procedure: EGD/VCE    Indication for Procedure: GIB    Pertinent History: 67 year old male with HFrEF and NICM AICD, s/p LVAD on coumadin, AFib, HTN, 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 He had an EGD 7/3/17 that revealed a superficial gastric ulcer, followed by a video capsule endoscopy notable for angioectasias. He subsequently had a push enteroscopy on 17 with APC and clipping of 2 angioectasias in proximal jejunum. He was discharged on  and returns now, 6 days later, complaining of black stool. Patient states he noted 3 days of formed, black stool.      Sedation by Anesthesia [x]     PAST MEDICAL & SURGICAL HISTORY:  GIB (gastrointestinal bleeding)  Ventricular fibrillation: s/p AICD  PAF (paroxysmal atrial fibrillation): on xarelto  Non-Ischemic Cardiomyopathy  SVT (Supraventricular Tachycardia)  HTN  CHF (Congestive Heart Failure)  LVAD (left ventricular assist device) present  Status post left hip replacement  History of Prior Ablation Treatment: for afib  AICD (Automatic Cardioverter/Defibrillator) Present: [employer] with 1 [employer] [place] lead09- explanted and replaced with Pat[employer] 2 leads on 09      PMH of Gastroparesis [ ]  Gastric Surgery [ ]  Gastric Outlet Obstruction [ ] no    Allergies    No Known Allergies    Latex allergy: [ ] yes [x] no    Medications:MEDICATIONS  (STANDING):  sodium chloride 0.9% lock flush 3 milliLiter(s) IV Push every 8 hours  spironolactone 25 milliGRAM(s) Oral daily  amiodarone    Tablet 200 milliGRAM(s) Oral daily  mexiletine 150 milliGRAM(s) Oral two times a day  colchicine 0.6 milliGRAM(s) Oral daily  QUEtiapine 50 milliGRAM(s) Oral at bedtime  carvedilol 6.25 milliGRAM(s) Oral every 12 hours  hydrocortisone 1% Cream 1 Application(s) Topical three times a day  furosemide    Tablet 20 milliGRAM(s) Oral daily  docusate sodium 100 milliGRAM(s) Oral three times a day  folic acid 1 milliGRAM(s) Oral daily  ascorbic acid 500 milliGRAM(s) Oral daily  pantoprazole  Injectable 40 milliGRAM(s) IV Push two times a day    MEDICATIONS  (PRN):  acetaminophen   Tablet 650 milliGRAM(s) Oral every 6 hours PRN Mild Pain (1 - 3)  diphenhydrAMINE   Capsule 25 milliGRAM(s) Oral every 4 hours PRN Rash and/or Itching      Smoking: [ ] yes  [x] no    AICD/PPM: [x] yes   [ ] no    Pertinent lab data:                        9.5    11.2  )-----------( 287      ( 2017 05:29 )             29.3         136  |  103  |  20  ----------------------------<  80  4.6   |  20<L>  |  0.92    Ca    8.7      2017 05:29    TPro  7.0  /  Alb  3.5  /  TBili  0.6  /  DBili  x   /  AST  26  /  ALT  19  /  AlkPhos  150<H>  0724    PT/INR - ( 2017 05:29 )   PT: 19.3 sec;   INR: 1.75 ratio      PTT - ( 2017 12:35 )  PTT:32.4 sec        Physical Examination:  Daily Height in cm: 172.72 (2017 11:14)    Daily Weight in k.8 (2017 11:14)  Vital Signs Last 24 Hrs  T(C): 36.4 (2017 09:01), Max: 36.9 (2017 15:00)  T(F): 97.5 (2017 09:01), Max: 98.4 (2017 15:00)  HR: 83 (2017 09:01) (83 - 94)  BP: --  BP(mean): 82 (2017 09:) (82 - 94)  RR: 18 (2017 09:01) (16 - 18)  SpO2: 99% (2017 09:01) (99% - 100%)    BP:  108/85            HR: 83   SPO2: 99%      Temperature: 36.4    Constitutional: NAD    HEENT: PERRLA, EOMI,       Neck:  No JVD    Respiratory: CTAB/L    Cardiovascular: S1 and S2    Gastrointestinal: BS+, soft, NT/ND    Extremities: No peripheral edema    Neurological: A/O x 3, no focal deficits    Psychiatric: Normal mood, normal affect    : No Rosas    Skin: No rashes    Comments:    ASA Class: I [ ]  II [ ]  III [ ]  IV [x]    The patient is a suitable candidate for the planned procedure unless box checked [ ]  No, explain:

## 2017-07-25 NOTE — PROGRESS NOTE ADULT - ASSESSMENT
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 LVAD working well without PI/power events. MAP 90.     -GI recs appreciated  - f/u Capsule study  - trend CBC  - IV PPI BID   - cont to hold coumadin  - given MAPs, continue home carvedilol and furosemide   - c/w antiarrhythmics    [id number] for covering on call fellow after 5 pm

## 2017-07-25 NOTE — CHART NOTE - NSCHARTNOTEFT_GEN_A_CORE
Upon Nutritional Assessment by the Registered Dietitian your patient was determined to meet criteria / has evidence of the following diagnosis/diagnoses:          [ X]  Mild Protein Calorie Malnutrition        [ ]  Moderate Protein Calorie Malnutrition        [ ] Severe Protein Calorie Malnutrition        [ ] Unspecified Protein Calorie Malnutrition        [ ] Underweight / BMI <19        [ ] Morbid Obesity / BMI > 40      Findings as based on:  [ X] Comprehensive nutrition assessment: Previous 16lbs Wt loss, now with 7lb wt regain.   [X ] Nutrition Focused Physical Exam: moderate muscle wasting   [X ] Other:       Nutrition Plan/Recommendations:    When medically feasible, advance diet to Low Na, and Ensure Enlive x1 daily         PROVIDER Section:     By signing this assessment you are acknowledging and agree with the diagnosis/diagnoses assigned by the Registered Dietitian    Comments:

## 2017-07-25 NOTE — DIETITIAN INITIAL EVALUATION ADULT. - PHYSICAL APPEARANCE
well nourished/Nutrition physical focused exam. Moderate muscle wasting at temporals and clavicles. Fat wasting at ribs no longer present.

## 2017-07-25 NOTE — DIETITIAN INITIAL EVALUATION ADULT. - PERTINENT MEDS FT
Hgb/Hct:9.5/29.3-low, Na:136, K:4.6, Cl:103, BUN:20, Cr:0.92, Glucose:80,Total Protein:7.0, Albumin:3.5, Total Bilirubin:0.6, AlkPhos:150-high, AST:26, ALT:19,

## 2017-07-25 NOTE — PROGRESS NOTE ADULT - ASSESSMENT
Overall coping well with readmission to hosptiall; scheduled for EGD today.  Mood "good"  Slept well last night.  Open to discussing his feelings; receptive to support and validation.  Feeling confident with LVAD equipment; close friend Tahira and brother Nawaf doing dressing changes.       DX:  Systolic heart failure; r/o Adjustment disorder with anxiety/depression     Recommendation:   Behavioral Cardiology will follow

## 2017-07-25 NOTE — DIETITIAN INITIAL EVALUATION ADULT. - ADHERENCE
good/Pt reports following a low NA diet PTA. Pt further states she he was monitoring his dark green vegetables intake.

## 2017-07-25 NOTE — DIETITIAN INITIAL EVALUATION ADULT. - ENERGY NEEDS
Ht: 5'8", Wt: 68.8kg, BMI: 23.0kg/m2, IBW: 154lbs(+/-10%), 98%IBW  Pertinent information: Pt with NICM, s/p HeartMate2 LVAD implant 6/22/2017, c/b by GIB s/p EGD with APC and clipping of non bleeding lesions. Per chart, Pt now admitted with drop in H/H and melena x3 days. Pt s/p EGD, plan for capsule study  No Edema, No pressure ulcers

## 2017-07-25 NOTE — PROGRESS NOTE ADULT - SUBJECTIVE AND OBJECTIVE BOX
Behavioral Cardiology Progress Note     HPI:  66 y/o male with advanced heart failure (NICM); s/p LVAD implant (6/12/17), course complicated by GIB found to be 2/2 AVMs s/p APC with push enteroscopy (7/6/17), discharged to [facility] (7/17/17); admitted from rehab with c/o melena x 3 days with suspicion of another GIB. Readmitted for further work up and medical management.    Behavioral Health Assessment:     Mood:  "I'm going to have that test today."         Current stressors:   Post-surgical recovery   Readmission to hosptial   Adjustment to LVAD     Support system/family support: Good support from family and close friend      Coping strategies: Talking with family and staff, watching TV; thinking about his granddaughter     Understanding of medical illness and treatment plan: Good understanding of LVAD education and management.       MSE: Pt seen resting in bed.  A&Ox3.  Well related with good eye contact.  Thought process goal directed.  No abnormal thought content; denies s/i.  Mood "good"  Affect full range.  Insight and judgment adequate.

## 2017-07-25 NOTE — PROCEDURE NOTE - ADDITIONAL PROCEDURE DETAILS
Indication: Post Endoscopy check/ Magnet used  Tele: NSR 70's  Normal sensing/ pacing thresholds and lead impedance  At/AF [place] 37%

## 2017-07-25 NOTE — DIETITIAN INITIAL EVALUATION ADULT. - NS AS NUTRI INTERV ED CONTENT3
Nutrition relationship to health/disease/Recommended modifications/Purpose of the nutrition education/Discussed coumadin/vitamin K drug interaction. Stressed consistent intake of vitamin K and reviewed foods high in Vitamin K. Reviewed Coumadin booklet with Pt. Reinforced CHF education, daily Wt's and Wt gain parameters to contact MD. Pt verbalized understanding, uses tach back points. RD remains available for diet education review./Other (specify)

## 2017-07-25 NOTE — DIETITIAN INITIAL EVALUATION ADULT. - FACTORS AFF FOOD INTAKE
difficulty feeding self/Altered GI function. Pt admitted with guaiac+ stools, NPO s/p EGD with plans for capsule study. Pt requres tray set up assistance/other (specify)

## 2017-07-25 NOTE — PROGRESS NOTE ADULT - SUBJECTIVE AND OBJECTIVE BOX
Interval History:  Pt has no complaints today. He had EGD with Push enteroscopy with laser intervention. Pt also now with capsule endoscopy. Pt states last BM was last night and green in color.     ROS: He denies SOB, CP, n/v/d, lower extremity edema.     Medications:  sodium chloride 0.9% lock flush 3 milliLiter(s) IV Push every 8 hours  spironolactone 25 milliGRAM(s) Oral daily  amiodarone    Tablet 200 milliGRAM(s) Oral daily  acetaminophen   Tablet 650 milliGRAM(s) Oral every 6 hours PRN  mexiletine 150 milliGRAM(s) Oral two times a day  diphenhydrAMINE   Capsule 25 milliGRAM(s) Oral every 4 hours PRN  colchicine 0.6 milliGRAM(s) Oral daily  QUEtiapine 50 milliGRAM(s) Oral at bedtime  carvedilol 6.25 milliGRAM(s) Oral every 12 hours  hydrocortisone 1% Cream 1 Application(s) Topical three times a day  furosemide    Tablet 20 milliGRAM(s) Oral daily  docusate sodium 100 milliGRAM(s) Oral three times a day  folic acid 1 milliGRAM(s) Oral daily  ascorbic acid 500 milliGRAM(s) Oral daily  pantoprazole  Injectable 40 milliGRAM(s) IV Push two times a day    Vitals:  T(C): 36.3 (17 @ 14:57), Max: 36.7 (17 @ 04:30)  HR: 86 (17 @ 14:57) (83 - 87)  BP: --  BP(mean): 110 (17 @ 14:57) (82 - 110)  ABP: --  ABP(mean): --  RR: 18 (17 @ 14:57) (16 - 18)  SpO2: 100% (17 @ 14:57) (99% - 100%)  Wt(kg): --  CVP(cm H2O): --  CO: --  CI: --  PA: --  PA(mean): --  PCWP: --  SVR: --  PVR: --    Daily     Daily Weight in k.8 (2017 14:52)    Weight (kg): 68.8 ( @ 11:14)    I&O's Summary    :  -  2017 07:00  --------------------------------------------------------  IN: 320 mL / OUT: 1400 mL / NET: -1080 mL    2017 07:  -  2017 17:50  --------------------------------------------------------  IN: 0 mL / OUT: 850 mL / NET: -850 mL        Physical Exam:  Appearance: No Acute Distress  HEENT: No JVD  Cardiovascular: LVAD noted  Respiratory: Clear to auscultation bilaterally  Gastrointestinal: Soft, Non-tender	  Skin: No cyanosis	  Neurologic: Non-focal  Extremities: No LE edema  Psychiatry: A & O x 3, Mood & affect appropriate    LVAD Interrogation:  Pump Flow:  Pump Speed:  Pulse Index:  Pump Power:  VAD Events: 0  Driveline evaluation:    Programming Changes: No changes made    Labs:                        9.5    11.2  )-----------( 287      ( 2017 05:29 )             29.3         136  |  103  |  20  ----------------------------<  80  4.6   |  20<L>  |  0.92    Ca    8.7      2017 05:29    TPro  7.0  /  Alb  3.5  /  TBili  0.6  /  DBili  x   /  AST  26  /  ALT  19  /  AlkPhos  150<H>      PT/INR - ( 2017 05:29 )   PT: 19.3 sec;   INR: 1.75 ratio         PTT - ( 2017 12:35 )  PTT:32.4 sec          Lactate Dehydrogenase, Serum: 242 U/L ( @ 12:35)

## 2017-07-25 NOTE — DIETITIAN INITIAL EVALUATION ADULT. - SIGNS/SYMPTOMS
moderate muscle wasting, history of 16lb wt loss with 7lb wt regain Pt able to use limited teach back points regarding coumadin and vitamin K interaction

## 2017-07-25 NOTE — PROGRESS NOTE ADULT - PROBLEM SELECTOR PLAN 2
Followed by Dr Quentin [facility]  NPO   EGD 7/25 7/25 Capsule study  -clears @ 7pm  full diet 11 pm   coumadin n hold

## 2017-07-25 NOTE — PROGRESS NOTE ADULT - ASSESSMENT
This is 67 year old man with PMHx of CHF, NICM, s/p 6/12/17 HM2 LVAD post op prolonged 2/2 GIB s/p EGD finding: non-bleeding small superficial gastric ulcer, Small bowel capsule showing 2 non-bleeding angiodysplastic lesions in the jejunum s/p argon plasma coagulation (APC); Clips were placed. Patient discharged to [facility] rehab on 7/17. Now presents to [facility] on 7/24 from rehab facility with c/o melena x 3 days. Found to have positive stool guaiac, and anemic with an H&H 6.9 and 20.8 Transfused on 7/23 with 2 unit PRBC's. Denies abdominal pain, N/V/D, or bloody stools at this time.  Readmitted for further work up and medical management.    7/25 underwent EGD this am capsule administered this afternoon npo- no stool today 9.5/29.3

## 2017-07-25 NOTE — DIETITIAN INITIAL EVALUATION ADULT. - ORAL INTAKE PTA
good/Pt reports an "excellent" appetite while at rehab. Pt states he eat 100% of all three meals daily.

## 2017-07-26 LAB
ANION GAP SERPL CALC-SCNC: 11 MMOL/L — SIGNIFICANT CHANGE UP (ref 5–17)
APTT BLD: 30.9 SEC — SIGNIFICANT CHANGE UP (ref 27.5–37.4)
APTT BLD: 33.7 SEC — SIGNIFICANT CHANGE UP (ref 27.5–37.4)
APTT BLD: 43 SEC — HIGH (ref 27.5–37.4)
BUN SERPL-MCNC: 26 MG/DL — HIGH (ref 7–23)
CALCIUM SERPL-MCNC: 8.6 MG/DL — SIGNIFICANT CHANGE UP (ref 8.4–10.5)
CHLORIDE SERPL-SCNC: 103 MMOL/L — SIGNIFICANT CHANGE UP (ref 96–108)
CO2 SERPL-SCNC: 21 MMOL/L — LOW (ref 22–31)
CREAT SERPL-MCNC: 1.15 MG/DL — SIGNIFICANT CHANGE UP (ref 0.5–1.3)
GLUCOSE SERPL-MCNC: 110 MG/DL — HIGH (ref 70–99)
HCT VFR BLD CALC: 28.5 % — LOW (ref 39–50)
HCT VFR BLD CALC: 30.6 % — LOW (ref 39–50)
HGB BLD-MCNC: 9.4 G/DL — LOW (ref 13–17)
HGB BLD-MCNC: 9.9 G/DL — LOW (ref 13–17)
INR BLD: 1.6 RATIO — HIGH (ref 0.88–1.16)
MCHC RBC-ENTMCNC: 30.3 PG — SIGNIFICANT CHANGE UP (ref 27–34)
MCHC RBC-ENTMCNC: 30.6 PG — SIGNIFICANT CHANGE UP (ref 27–34)
MCHC RBC-ENTMCNC: 32.4 GM/DL — SIGNIFICANT CHANGE UP (ref 32–36)
MCHC RBC-ENTMCNC: 33 GM/DL — SIGNIFICANT CHANGE UP (ref 32–36)
MCV RBC AUTO: 92.5 FL — SIGNIFICANT CHANGE UP (ref 80–100)
MCV RBC AUTO: 93.7 FL — SIGNIFICANT CHANGE UP (ref 80–100)
PLATELET # BLD AUTO: 267 K/UL — SIGNIFICANT CHANGE UP (ref 150–400)
PLATELET # BLD AUTO: 279 K/UL — SIGNIFICANT CHANGE UP (ref 150–400)
POTASSIUM SERPL-MCNC: 4.3 MMOL/L — SIGNIFICANT CHANGE UP (ref 3.5–5.3)
POTASSIUM SERPL-SCNC: 4.3 MMOL/L — SIGNIFICANT CHANGE UP (ref 3.5–5.3)
PROTHROM AB SERPL-ACNC: 17.4 SEC — HIGH (ref 9.8–12.7)
RBC # BLD: 3.08 M/UL — LOW (ref 4.2–5.8)
RBC # BLD: 3.26 M/UL — LOW (ref 4.2–5.8)
RBC # FLD: 17 % — HIGH (ref 10.3–14.5)
RBC # FLD: 17.4 % — HIGH (ref 10.3–14.5)
SODIUM SERPL-SCNC: 135 MMOL/L — SIGNIFICANT CHANGE UP (ref 135–145)
WBC # BLD: 12.6 K/UL — HIGH (ref 3.8–10.5)
WBC # BLD: 9.2 K/UL — SIGNIFICANT CHANGE UP (ref 3.8–10.5)
WBC # FLD AUTO: 12.6 K/UL — HIGH (ref 3.8–10.5)
WBC # FLD AUTO: 9.2 K/UL — SIGNIFICANT CHANGE UP (ref 3.8–10.5)

## 2017-07-26 PROCEDURE — 93750 INTERROGATION VAD IN PERSON: CPT

## 2017-07-26 PROCEDURE — 99232 SBSQ HOSP IP/OBS MODERATE 35: CPT | Mod: 25,GC

## 2017-07-26 RX ORDER — HEPARIN SODIUM 5000 [USP'U]/ML
1200 INJECTION INTRAVENOUS; SUBCUTANEOUS
Qty: 25000 | Refills: 0 | Status: DISCONTINUED | OUTPATIENT
Start: 2017-07-26 | End: 2017-07-27

## 2017-07-26 RX ORDER — HEPARIN SODIUM 5000 [USP'U]/ML
1000 INJECTION INTRAVENOUS; SUBCUTANEOUS
Qty: 25000 | Refills: 0 | Status: DISCONTINUED | OUTPATIENT
Start: 2017-07-26 | End: 2017-07-26

## 2017-07-26 RX ADMIN — SODIUM CHLORIDE 3 MILLILITER(S): 9 INJECTION INTRAMUSCULAR; INTRAVENOUS; SUBCUTANEOUS at 15:34

## 2017-07-26 RX ADMIN — PANTOPRAZOLE SODIUM 40 MILLIGRAM(S): 20 TABLET, DELAYED RELEASE ORAL at 06:53

## 2017-07-26 RX ADMIN — HEPARIN SODIUM 10 UNIT(S)/HR: 5000 INJECTION INTRAVENOUS; SUBCUTANEOUS at 19:52

## 2017-07-26 RX ADMIN — Medication 25 MILLIGRAM(S): at 22:38

## 2017-07-26 RX ADMIN — Medication 20 MILLIGRAM(S): at 06:53

## 2017-07-26 RX ADMIN — Medication 1 APPLICATION(S): at 06:52

## 2017-07-26 RX ADMIN — Medication 100 MILLIGRAM(S): at 15:45

## 2017-07-26 RX ADMIN — Medication 1 APPLICATION(S): at 15:46

## 2017-07-26 RX ADMIN — SPIRONOLACTONE 25 MILLIGRAM(S): 25 TABLET, FILM COATED ORAL at 06:53

## 2017-07-26 RX ADMIN — PANTOPRAZOLE SODIUM 40 MILLIGRAM(S): 20 TABLET, DELAYED RELEASE ORAL at 17:34

## 2017-07-26 RX ADMIN — HEPARIN SODIUM 10 UNIT(S)/HR: 5000 INJECTION INTRAVENOUS; SUBCUTANEOUS at 12:24

## 2017-07-26 RX ADMIN — Medication 100 MILLIGRAM(S): at 06:54

## 2017-07-26 RX ADMIN — Medication 100 MILLIGRAM(S): at 22:38

## 2017-07-26 RX ADMIN — AMIODARONE HYDROCHLORIDE 200 MILLIGRAM(S): 400 TABLET ORAL at 06:54

## 2017-07-26 RX ADMIN — SODIUM CHLORIDE 3 MILLILITER(S): 9 INJECTION INTRAMUSCULAR; INTRAVENOUS; SUBCUTANEOUS at 06:52

## 2017-07-26 RX ADMIN — SODIUM CHLORIDE 3 MILLILITER(S): 9 INJECTION INTRAMUSCULAR; INTRAVENOUS; SUBCUTANEOUS at 22:34

## 2017-07-26 RX ADMIN — Medication 650 MILLIGRAM(S): at 22:38

## 2017-07-26 RX ADMIN — Medication 500 MILLIGRAM(S): at 12:24

## 2017-07-26 RX ADMIN — Medication 0.6 MILLIGRAM(S): at 12:24

## 2017-07-26 RX ADMIN — MEXILETINE HYDROCHLORIDE 150 MILLIGRAM(S): 150 CAPSULE ORAL at 17:34

## 2017-07-26 RX ADMIN — MEXILETINE HYDROCHLORIDE 150 MILLIGRAM(S): 150 CAPSULE ORAL at 06:53

## 2017-07-26 RX ADMIN — Medication 1 APPLICATION(S): at 22:38

## 2017-07-26 RX ADMIN — QUETIAPINE FUMARATE 50 MILLIGRAM(S): 200 TABLET, FILM COATED ORAL at 22:38

## 2017-07-26 RX ADMIN — CARVEDILOL PHOSPHATE 6.25 MILLIGRAM(S): 80 CAPSULE, EXTENDED RELEASE ORAL at 17:34

## 2017-07-26 RX ADMIN — CARVEDILOL PHOSPHATE 6.25 MILLIGRAM(S): 80 CAPSULE, EXTENDED RELEASE ORAL at 06:54

## 2017-07-26 RX ADMIN — Medication 1 MILLIGRAM(S): at 12:25

## 2017-07-26 NOTE — PROGRESS NOTE ADULT - SUBJECTIVE AND OBJECTIVE BOX
Interval History:  No acute events overnight noted. Pt states had a BM overnight which he states was green but also may have been dark. He denies any overt bleeding and states feels great    ROS: Denies CP, SOB, N/V/D, LE edema.     Medications:  sodium chloride 0.9% lock flush 3 milliLiter(s) IV Push every 8 hours  spironolactone 25 milliGRAM(s) Oral daily  amiodarone    Tablet 200 milliGRAM(s) Oral daily  acetaminophen   Tablet 650 milliGRAM(s) Oral every 6 hours PRN  mexiletine 150 milliGRAM(s) Oral two times a day  diphenhydrAMINE   Capsule 25 milliGRAM(s) Oral every 4 hours PRN  colchicine 0.6 milliGRAM(s) Oral daily  QUEtiapine 50 milliGRAM(s) Oral at bedtime  carvedilol 6.25 milliGRAM(s) Oral every 12 hours  hydrocortisone 1% Cream 1 Application(s) Topical three times a day  furosemide    Tablet 20 milliGRAM(s) Oral daily  docusate sodium 100 milliGRAM(s) Oral three times a day  folic acid 1 milliGRAM(s) Oral daily  ascorbic acid 500 milliGRAM(s) Oral daily  pantoprazole  Injectable 40 milliGRAM(s) IV Push two times a day  heparin  Infusion 1000 Unit(s)/Hr IV Continuous <Continuous>    Vitals:  T(C): 36.9 (17 @ 11:30), Max: 37 (17 @ 23:00)  HR: 86 (17 @ 11:30) (81 - 89)  BP: --  BP(mean): 88 (17 @ 11:30) (88 - 110)  ABP: --  ABP(mean): --  RR: 18 (17 @ 11:30) (18 - 18)  SpO2: 100% (17 @ 11:30) (99% - 100%)  Wt(kg): --  CVP(cm H2O): --  CO: --  CI: --  PA: --  PA(mean): --  PCWP: --  SVR: --  PVR: --    Daily     Daily Weight in k (2017 08:42)        I&O's Summary    2017 07: 07:00  --------------------------------------------------------  IN: 320 mL / OUT: 1200 mL / NET: -880 mL    2017 07:  -  2017 14:05  --------------------------------------------------------  IN: 520 mL / OUT: 700 mL / NET: -180 mL        Physical Exam:  Appearance: No Acute Distress  HEENT: No JVD  Cardiovascular: Normal S1 S2, No murmurs/rubs/gallops  Respiratory: Clear to auscultation bilaterally  Gastrointestinal: Soft, Non-tender	  Skin: No cyanosis	  Neurologic: Non-focal  Extremities: No LE edema  Psychiatry: A & O x 3, Mood & affect appropriate    LVAD Interrogation:  Pump Flow:  Pump Speed:  Pulse Index:  Pump Power:  VAD Events:   Driveline evaluation:    Programming Changes: No changes made    Labs:                        9.4    9.2   )-----------( 267      ( 2017 05:09 )             28.5         135  |  103  |  26<H>  ----------------------------<  110<H>  4.3   |  21<L>  |  1.15    Ca    8.6      2017 05:09      PT/INR - ( 2017 05:09 )   PT: 17.4 sec;   INR: 1.60 ratio         PTT - ( 2017 05:09 )  PTT:30.9 sec          Lactate Dehydrogenase, Serum: 242 U/L ( @ 12:35)          TELEMETRY: 3 episodes of NSVT Interval History:  No acute events overnight noted. Pt states had a BM overnight which he states was green but also may have been dark. He denies any overt bleeding and states feels great    ROS: Denies CP, SOB, N/V/D, LE edema.     Medications:  sodium chloride 0.9% lock flush 3 milliLiter(s) IV Push every 8 hours  spironolactone 25 milliGRAM(s) Oral daily  amiodarone    Tablet 200 milliGRAM(s) Oral daily  acetaminophen   Tablet 650 milliGRAM(s) Oral every 6 hours PRN  mexiletine 150 milliGRAM(s) Oral two times a day  diphenhydrAMINE   Capsule 25 milliGRAM(s) Oral every 4 hours PRN  colchicine 0.6 milliGRAM(s) Oral daily  QUEtiapine 50 milliGRAM(s) Oral at bedtime  carvedilol 6.25 milliGRAM(s) Oral every 12 hours  hydrocortisone 1% Cream 1 Application(s) Topical three times a day  furosemide    Tablet 20 milliGRAM(s) Oral daily  docusate sodium 100 milliGRAM(s) Oral three times a day  folic acid 1 milliGRAM(s) Oral daily  ascorbic acid 500 milliGRAM(s) Oral daily  pantoprazole  Injectable 40 milliGRAM(s) IV Push two times a day  heparin  Infusion 1000 Unit(s)/Hr IV Continuous <Continuous>    Vitals:  T(C): 36.9 (17 @ 11:30), Max: 37 (17 @ 23:00)  HR: 86 (17 @ 11:30) (81 - 89)  BP: --  BP(mean): 88 (17 @ 11:30) (88 - 110)  ABP: --  ABP(mean): --  RR: 18 (17 @ 11:30) (18 - 18)  SpO2: 100% (17 @ 11:30) (99% - 100%)  Wt(kg): --  CVP(cm H2O): --  CO: --  CI: --  PA: --  PA(mean): --  PCWP: --  SVR: --  PVR: --    Daily     Daily Weight in k (2017 08:42)        I&O's Summary    2017 07: 07:00  --------------------------------------------------------  IN: 320 mL / OUT: 1200 mL / NET: -880 mL    2017 07:01  -  2017 14:05  --------------------------------------------------------  IN: 520 mL / OUT: 700 mL / NET: -180 mL        Physical Exam:  Appearance: No Acute Distress  HEENT: No JVD  Cardiovascular: LVAD noted  Respiratory: Clear to auscultation bilaterally  Gastrointestinal: Soft, Non-tender	  Skin: No cyanosis	  Neurologic: Non-focal  Extremities: No LE edema  Psychiatry: A & O x 3, Mood & affect appropriate    LVAD Interrogation:  Pump Flow:  Pump Speed:  Pulse Index:  Pump Power:  VAD Events:   Driveline evaluation:    Programming Changes: No changes made    Labs:                        9.4    9.2   )-----------( 267      ( 2017 05:09 )             28.5         135  |  103  |  26<H>  ----------------------------<  110<H>  4.3   |  21<L>  |  1.15    Ca    8.6      2017 05:09      PT/INR - ( 2017 05:09 )   PT: 17.4 sec;   INR: 1.60 ratio         PTT - ( 2017 05:09 )  PTT:30.9 sec          Lactate Dehydrogenase, Serum: 242 U/L ( @ 12:35)          TELEMETRY: 3 episodes of NSVT

## 2017-07-26 NOTE — PROGRESS NOTE ADULT - ASSESSMENT
This is 67 year old man with PMHx of CHF, NICM, s/p 6/12/17 HM2 LVAD post op prolonged 2/2 GIB s/p EGD finding: non-bleeding small superficial gastric ulcer, Small bowel capsule showing 2 non-bleeding angiodysplastic lesions in the jejunum s/p argon plasma coagulation (APC); Clips were placed. Patient discharged to [facility] rehab on 7/17. Now presents to [facility] on 7/24 from rehab facility with c/o melena x 3 days. Found to have positive stool guaiac, and anemic with an H&H 6.9 and 20.8 Transfused on 7/23 with 2 unit PRBC's. Denies abdominal pain, N/V/D, or bloody stools at this time.  Readmitted for further work up and medical management.    7/25 underwent EGD this am capsule administered this afternoon npo- no stool today 9.5/29.3  7/25 Enteroscopy Finding: The examined esophagus was normal. The entire examined stomach was normal. There was no evidence of significant pathology in the entire examined duodenum. Two angioectasias with no bleeding were found in the proximal jejunum. Coagulation for  hemostasis using bipolar probe was successful. Impression: Normal esophagus, stomach and duodenum. Two non-bleeding angioectasias in the jejunum. Treated with bipolar cautery.  No specimens collected.  7/26 Melena stool x 1; CBC series   7/26 Heparin gtt resumed as per CHF team. Plan to bridge with coumadin if no further bleeding; Awaiting Capsule study results.

## 2017-07-26 NOTE — PROGRESS NOTE ADULT - SUBJECTIVE AND OBJECTIVE BOX
VITAL SIGNS    Telemetry:  NSR 80-90 (17 Beats WCT)    Vital Signs Last 24 Hrs  T(C): 36.9 (17 @ 11:30), Max: 37 (17 @ 23:00)  T(F): 98.4 (17 @ 11:30), Max: 98.6 (17 @ 23:00)  HR: 86 (17 @ 11:30) (81 - 89)  BP: --  RR: 18 (17 @ 11:30) (18 - 18)  SpO2: 100% (17 @ 11:30) (99% - 100%)                    @ 07:01  -   @ 07:00  --------------------------------------------------------  IN: 320 mL / OUT: 1200 mL / NET: -880 mL     @ 07:01  -   @ 14:23  --------------------------------------------------------  IN: 520 mL / OUT: 700 mL / NET: -180 mL        Daily Weight in k (2017 08:42)        Coumadin    [ ] YES          [X  ]      NO         Reason:  GIB                      PHYSICAL EXAM    Neurology: alert and oriented x 3, nonfocal, no gross deficits    CV : (+) LVAD Hum    Sternal Wound :  CDI , Stable    Lungs: CTA     Abdomen: soft, nontender, nondistended, positive bowel sounds, last bowel movement     : Voiding            Extremities: B/L LE negative edema; (-) calf tenderness; (+) 2 DP palpable           sodium chloride 0.9% lock flush 3 milliLiter(s) IV Push every 8 hours  spironolactone 25 milliGRAM(s) Oral daily  amiodarone Tablet 200 milliGRAM(s) Oral daily  acetaminophen Tablet 650 milliGRAM(s) Oral every 6 hours PRN  mexiletine 150 milliGRAM(s) Oral two times a day  diphenhydrAMINE  Capsule 25 milliGRAM(s) Oral every 4 hours PRN  colchicine 0.6 milliGRAM(s) Oral daily  QUEtiapine 50 milliGRAM(s) Oral at bedtime  carvedilol 6.25 milliGRAM(s) Oral every 12 hours  hydrocortisone 1% Cream 1 Application(s) Topical three times a day  furosemide  Tablet 20 milliGRAM(s) Oral daily  docusate sodium 100 milliGRAM(s) Oral three times a day  folic acid 1 milliGRAM(s) Oral daily  ascorbic acid 500 milliGRAM(s) Oral daily  pantoprazole  Injectable 40 milliGRAM(s) IV Push two times a day  heparin  Infusion 1000 Unit(s)/Hr IV Continuous <Continuous>          Physical Therapy Rec:   Home  [  ]   Home w/ PT  [ X ]  Rehab  [  ]    Discussed with Cardiothoracic Team at AM rounds.

## 2017-07-26 NOTE — PROGRESS NOTE ADULT - PROBLEM SELECTOR PLAN 1
1. Continue with current medication regimen  2. amiodarone Tablet 200 milliGRAM(s) Oral daily  3. Coreg 6.25 mg PO BID   4. mexiletine 150 milliGRAM(s) Oral two times a day  5. Diuresis  6. Activity as tolerated   7. Plan D/C home one medically cleared

## 2017-07-26 NOTE — PROGRESS NOTE ADULT - ASSESSMENT
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 LVAD working well without PI/power events. MAP 90.     - GI recs appreciated  - f/u Capsule study  - trend CBC  - IV PPI BID   - start heparin gtt with bridge to coumadin  - given MAPs, continue home carvedilol and furosemide   - c/w antiarrhythmics    [id number] for covering on call fellow after 5 pm

## 2017-07-27 LAB
APTT BLD: 64.7 SEC — HIGH (ref 27.5–37.4)
APTT BLD: 73.5 SEC — HIGH (ref 27.5–37.4)
APTT BLD: 92.4 SEC — HIGH (ref 27.5–37.4)
HCT VFR BLD CALC: 29.1 % — LOW (ref 39–50)
HCT VFR BLD CALC: 34 % — LOW (ref 39–50)
HGB BLD-MCNC: 10.4 G/DL — LOW (ref 13–17)
HGB BLD-MCNC: 9.7 G/DL — LOW (ref 13–17)
MCHC RBC-ENTMCNC: 28.6 PG — SIGNIFICANT CHANGE UP (ref 27–34)
MCHC RBC-ENTMCNC: 30.7 GM/DL — LOW (ref 32–36)
MCHC RBC-ENTMCNC: 30.8 PG — SIGNIFICANT CHANGE UP (ref 27–34)
MCHC RBC-ENTMCNC: 33.4 GM/DL — SIGNIFICANT CHANGE UP (ref 32–36)
MCV RBC AUTO: 92.1 FL — SIGNIFICANT CHANGE UP (ref 80–100)
MCV RBC AUTO: 93.2 FL — SIGNIFICANT CHANGE UP (ref 80–100)
PLATELET # BLD AUTO: 264 K/UL — SIGNIFICANT CHANGE UP (ref 150–400)
PLATELET # BLD AUTO: 283 K/UL — SIGNIFICANT CHANGE UP (ref 150–400)
RBC # BLD: 3.16 M/UL — LOW (ref 4.2–5.8)
RBC # BLD: 3.65 M/UL — LOW (ref 4.2–5.8)
RBC # FLD: 16.7 % — HIGH (ref 10.3–14.5)
RBC # FLD: 17.2 % — HIGH (ref 10.3–14.5)
WBC # BLD: 10.1 K/UL — SIGNIFICANT CHANGE UP (ref 3.8–10.5)
WBC # BLD: 12 K/UL — HIGH (ref 3.8–10.5)
WBC # FLD AUTO: 10.1 K/UL — SIGNIFICANT CHANGE UP (ref 3.8–10.5)
WBC # FLD AUTO: 12 K/UL — HIGH (ref 3.8–10.5)

## 2017-07-27 PROCEDURE — 99231 SBSQ HOSP IP/OBS SF/LOW 25: CPT

## 2017-07-27 PROCEDURE — 99232 SBSQ HOSP IP/OBS MODERATE 35: CPT | Mod: 25,GC

## 2017-07-27 PROCEDURE — 93750 INTERROGATION VAD IN PERSON: CPT

## 2017-07-27 RX ORDER — WARFARIN SODIUM 2.5 MG/1
3 TABLET ORAL ONCE
Qty: 0 | Refills: 0 | Status: COMPLETED | OUTPATIENT
Start: 2017-07-27 | End: 2017-07-27

## 2017-07-27 RX ORDER — HEPARIN SODIUM 5000 [USP'U]/ML
1000 INJECTION INTRAVENOUS; SUBCUTANEOUS
Qty: 25000 | Refills: 0 | Status: DISCONTINUED | OUTPATIENT
Start: 2017-07-27 | End: 2017-07-29

## 2017-07-27 RX ORDER — HEPARIN SODIUM 5000 [USP'U]/ML
1100 INJECTION INTRAVENOUS; SUBCUTANEOUS
Qty: 25000 | Refills: 0 | Status: DISCONTINUED | OUTPATIENT
Start: 2017-07-27 | End: 2017-07-27

## 2017-07-27 RX ADMIN — MEXILETINE HYDROCHLORIDE 150 MILLIGRAM(S): 150 CAPSULE ORAL at 06:35

## 2017-07-27 RX ADMIN — MEXILETINE HYDROCHLORIDE 150 MILLIGRAM(S): 150 CAPSULE ORAL at 17:17

## 2017-07-27 RX ADMIN — SPIRONOLACTONE 25 MILLIGRAM(S): 25 TABLET, FILM COATED ORAL at 06:35

## 2017-07-27 RX ADMIN — QUETIAPINE FUMARATE 50 MILLIGRAM(S): 200 TABLET, FILM COATED ORAL at 21:10

## 2017-07-27 RX ADMIN — PANTOPRAZOLE SODIUM 40 MILLIGRAM(S): 20 TABLET, DELAYED RELEASE ORAL at 17:17

## 2017-07-27 RX ADMIN — SODIUM CHLORIDE 3 MILLILITER(S): 9 INJECTION INTRAMUSCULAR; INTRAVENOUS; SUBCUTANEOUS at 06:34

## 2017-07-27 RX ADMIN — Medication 0.6 MILLIGRAM(S): at 11:30

## 2017-07-27 RX ADMIN — Medication 1 APPLICATION(S): at 06:36

## 2017-07-27 RX ADMIN — CARVEDILOL PHOSPHATE 6.25 MILLIGRAM(S): 80 CAPSULE, EXTENDED RELEASE ORAL at 17:17

## 2017-07-27 RX ADMIN — HEPARIN SODIUM 12 UNIT(S)/HR: 5000 INJECTION INTRAVENOUS; SUBCUTANEOUS at 03:03

## 2017-07-27 RX ADMIN — SODIUM CHLORIDE 3 MILLILITER(S): 9 INJECTION INTRAMUSCULAR; INTRAVENOUS; SUBCUTANEOUS at 11:08

## 2017-07-27 RX ADMIN — Medication 20 MILLIGRAM(S): at 06:35

## 2017-07-27 RX ADMIN — CARVEDILOL PHOSPHATE 6.25 MILLIGRAM(S): 80 CAPSULE, EXTENDED RELEASE ORAL at 06:35

## 2017-07-27 RX ADMIN — HEPARIN SODIUM 11 UNIT(S)/HR: 5000 INJECTION INTRAVENOUS; SUBCUTANEOUS at 12:15

## 2017-07-27 RX ADMIN — AMIODARONE HYDROCHLORIDE 200 MILLIGRAM(S): 400 TABLET ORAL at 06:35

## 2017-07-27 RX ADMIN — SODIUM CHLORIDE 3 MILLILITER(S): 9 INJECTION INTRAMUSCULAR; INTRAVENOUS; SUBCUTANEOUS at 21:12

## 2017-07-27 RX ADMIN — Medication 1 APPLICATION(S): at 11:30

## 2017-07-27 RX ADMIN — Medication 100 MILLIGRAM(S): at 11:30

## 2017-07-27 RX ADMIN — Medication 25 MILLIGRAM(S): at 21:09

## 2017-07-27 RX ADMIN — PANTOPRAZOLE SODIUM 40 MILLIGRAM(S): 20 TABLET, DELAYED RELEASE ORAL at 06:35

## 2017-07-27 RX ADMIN — Medication 1 APPLICATION(S): at 21:12

## 2017-07-27 RX ADMIN — WARFARIN SODIUM 3 MILLIGRAM(S): 2.5 TABLET ORAL at 21:09

## 2017-07-27 RX ADMIN — Medication 100 MILLIGRAM(S): at 21:10

## 2017-07-27 RX ADMIN — HEPARIN SODIUM 10 UNIT(S)/HR: 5000 INJECTION INTRAVENOUS; SUBCUTANEOUS at 19:06

## 2017-07-27 RX ADMIN — Medication 500 MILLIGRAM(S): at 11:30

## 2017-07-27 RX ADMIN — Medication 100 MILLIGRAM(S): at 06:35

## 2017-07-27 RX ADMIN — Medication 1 MILLIGRAM(S): at 11:30

## 2017-07-27 NOTE — PROGRESS NOTE ADULT - PROBLEM SELECTOR PLAN 1
1. Continue with current medication regimen  2. amiodarone Tablet 200 milliGRAM(s) Oral daily  3. Coreg 6.25 mg PO BID   4. mexiletine 150 milliGRAM(s) Oral two times a day  5. Diuresis  6. Activity as tolerated   7. Plan D/C home one medically cleared 1. Continue with current medication regimen  2. amiodarone Tablet 200 milliGRAM(s) Oral daily  3. Coreg 6.25 mg PO BID   4. mexiletine 150 milliGRAM(s) Oral two times a day  5. Diuresis on lasix 20 daily/ aldactone 25 daily.  6. Activity as tolerated   7. Plan D/C home one medically cleared  8. Resume Coumadin 3mg today. Goal INR 1.8 - 2.2.

## 2017-07-27 NOTE — PROGRESS NOTE ADULT - SUBJECTIVE AND OBJECTIVE BOX
Interval History:  No acute events overnight noted. Pt states feels well. He reports one BM which he states was green/black.    ROS:   Denies CP, SOB, N/V/D, fevers, chills, LE swelling    Medications:  sodium chloride 0.9% lock flush 3 milliLiter(s) IV Push every 8 hours  spironolactone 25 milliGRAM(s) Oral daily  amiodarone    Tablet 200 milliGRAM(s) Oral daily  acetaminophen   Tablet 650 milliGRAM(s) Oral every 6 hours PRN  mexiletine 150 milliGRAM(s) Oral two times a day  diphenhydrAMINE   Capsule 25 milliGRAM(s) Oral every 4 hours PRN  colchicine 0.6 milliGRAM(s) Oral daily  QUEtiapine 50 milliGRAM(s) Oral at bedtime  carvedilol 6.25 milliGRAM(s) Oral every 12 hours  hydrocortisone 1% Cream 1 Application(s) Topical three times a day  furosemide    Tablet 20 milliGRAM(s) Oral daily  docusate sodium 100 milliGRAM(s) Oral three times a day  folic acid 1 milliGRAM(s) Oral daily  ascorbic acid 500 milliGRAM(s) Oral daily  pantoprazole  Injectable 40 milliGRAM(s) IV Push two times a day  heparin  Infusion 1100 Unit(s)/Hr IV Continuous <Continuous>    Vitals:  T(C): 36.6 (07-27-17 @ 11:18), Max: 37 (07-27-17 @ 07:00)  HR: 81 (07-27-17 @ 11:18) (77 - 93)  BP: --  BP(mean): 98 (07-27-17 @ 11:18) (80 - 98)  ABP: --  ABP(mean): --  RR: 18 (07-27-17 @ 11:18) (18 - 18)  SpO2: 100% (07-27-17 @ 11:18) (100% - 100%)  Wt(kg): --    I&O's Summary    26 Jul 2017 07:01  -  27 Jul 2017 07:00  --------------------------------------------------------  IN: 1352 mL / OUT: 1650 mL / NET: -298 mL    27 Jul 2017 07:01  -  27 Jul 2017 14:48  --------------------------------------------------------  IN: 420 mL / OUT: 200 mL / NET: 220 mL        Physical Exam:  Appearance: No Acute Distress  HEENT: No JVD  Cardiovascular: LVAD  Respiratory: Clear to auscultation bilaterally  Gastrointestinal: Soft, Non-tender	  Skin: No cyanosis	  Neurologic: Non-focal  Extremities: No LE edema  Psychiatry: A & O x 3, Mood & affect appropriate    LVAD Interrogation:  Pump Flow:  Pump Speed:  Pulse Index:  Pump Power:  VAD Events: 0  Driveline evaluation:    Programming Changes: No changes made    Labs:                        10.4   10.1  )-----------( 283      ( 27 Jul 2017 09:59 )             34.0     07-26    135  |  103  |  26<H>  ----------------------------<  110<H>  4.3   |  21<L>  |  1.15    Ca    8.6      26 Jul 2017 05:09      PT/INR - ( 26 Jul 2017 05:09 )   PT: 17.4 sec;   INR: 1.60 ratio         PTT - ( 27 Jul 2017 09:59 )  PTT:92.4 sec        TELEMETRY:

## 2017-07-27 NOTE — PROGRESS NOTE ADULT - ASSESSMENT
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 LVAD working well without PI/power events.     - GI recs appreciated  - trend CBC; CBC stable so far on Heparin gtt  - PPI Daily  - c/w with heparin gtt and monitor for bleeding  - consider octreotide.   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 LVAD working well without PI/power events.     - GI recs appreciated  - trend CBC; CBC stable so far on Heparin gtt  - PPI Daily  - c/w with heparin gtt and monitor for bleeding  - start coumadin 3mg tonight  - given MAPs, continue home carvedilol and furosemide ( would check SBP as pt with measurable BP)  - c/w antiarrhythmics    [phone] for covering on call fellow after 5 pm

## 2017-07-27 NOTE — PROGRESS NOTE ADULT - ASSESSMENT
This is 67 year old man with PMHx of CHF, NICM, s/p 6/12/17 HM2 LVAD post op prolonged 2/2 GIB s/p EGD finding: non-bleeding small superficial gastric ulcer, Small bowel capsule showing 2 non-bleeding angiodysplastic lesions in the jejunum s/p argon plasma coagulation (APC); Clips were placed. Patient discharged to [facility] rehab on 7/17. Now presents to [facility] on 7/24 from rehab facility with c/o melena x 3 days. Found to have positive stool guaiac, and anemic with an H&H 6.9 and 20.8 Transfused on 7/23 with 2 unit PRBC's. Denies abdominal pain, N/V/D, or bloody stools at this time.  Readmitted for further work up and medical management.    7/25 underwent EGD this am capsule administered this afternoon npo- no stool today 9.5/29.3  7/25 Enteroscopy Finding: The examined esophagus was normal. The entire examined stomach was normal. There was no evidence of significant pathology in the entire examined duodenum. Two angioectasias with no bleeding were found in the proximal jejunum. Coagulation for  hemostasis using bipolar probe was successful. Impression: Normal esophagus, stomach and duodenum. Two non-bleeding angioectasias in the jejunum. Treated with bipolar cautery.  No specimens collected.  7/26 Melena stool x 1; CBC series   7/26 Heparin gtt resumed as per CHF team. Plan to bridge with coumadin if no further bleeding; Awaiting Capsule study results. This is 67 year old man with PMHx of CHF, NICM, s/p 6/12/17 HM2 LVAD post op prolonged 2/2 GIB s/p EGD finding: non-bleeding small superficial gastric ulcer, Small bowel capsule showing 2 non-bleeding angiodysplastic lesions in the jejunum s/p argon plasma coagulation (APC); Clips were placed. Patient discharged to [facility] rehab on 7/17. Now presents to [facility] on 7/24 from rehab facility with c/o melena x 3 days. Found to have positive stool guaiac, and anemic with an H&H 6.9 and 20.8 Transfused on 7/23 with 2 unit PRBC's. Denies abdominal pain, N/V/D, or bloody stools at this time.  Readmitted for further work up and medical management.    7/25 underwent EGD this am capsule administered this afternoon npo- no stool today 9.5/29.3  7/25 Enteroscopy Finding: The examined esophagus was normal. The entire examined stomach was normal. There was no evidence of significant pathology in the entire examined duodenum. Two angioectasias with no bleeding were found in the proximal jejunum. Coagulation for  hemostasis using bipolar probe was successful. Impression: Normal esophagus, stomach and duodenum. Two non-bleeding angioectasias in the jejunum. Treated with bipolar cautery.  No specimens collected.  7/26 Melena stool x 1; CBC series   7/26 Heparin gtt resumed as per CHF team. Plan to bridge with coumadin if no further bleeding; Awaiting Capsule study results. This is 67 year old man with PMHx of CHF, NICM, s/p 6/12/17 HM2 LVAD post op prolonged 2/2 GIB s/p EGD finding: non-bleeding small superficial gastric ulcer, Small bowel capsule showing 2 non-bleeding angiodysplastic lesions in the jejunum s/p argon plasma coagulation (APC); Clips were placed. Patient discharged to [facility] rehab on 7/17. Now presents to [facility] on 7/24 from rehab facility with c/o melena x 3 days. Found to have positive stool guaiac, and anemic with an H&H 6.9 and 20.8 Transfused on 7/23 with 2 unit PRBC's. Denies abdominal pain, N/V/D, or bloody stools at this time.  Readmitted for further work up and medical management.    7/25 underwent EGD this am capsule administered this afternoon npo- no stool today 9.5/29.3  7/25 Enteroscopy Finding: The examined esophagus was normal. The entire examined stomach was normal. There was no evidence of significant pathology in the entire examined duodenum. Two angioectasias with no bleeding were found in the proximal jejunum. Coagulation for  hemostasis using bipolar probe was successful. Impression: Normal esophagus, stomach and duodenum. Two non-bleeding angioectasias in the jejunum. Treated with bipolar cautery.  No specimens collected.  7/26 Melena stool x 1; CBC series. Heparin gtt resumed as per CHF team. Plan to bridge with coumadin if no further bleeding; Awaiting Capsule study results.    7/27 Small bowel capsule showing 2 small angioectasias in distal small bowel without active bleeding or stigmata of recent bleeding. No signs of overt bleeding currently. H/H stable. This is 67 year old man with PMHx of CHF, NICM, s/p 6/12/17 HM2 LVAD post op prolonged 2/2 GIB s/p EGD finding: non-bleeding small superficial gastric ulcer, Small bowel capsule showing 2 non-bleeding angiodysplastic lesions in the jejunum s/p argon plasma coagulation (APC); Clips were placed. Patient discharged to [facility] rehab on 7/17. Now presents to [facility] on 7/24 from rehab facility with c/o melena x 3 days. Found to have positive stool guaiac, and anemic with an H&H 6.9 and 20.8 Transfused on 7/23 with 2 unit PRBC's. Denies abdominal pain, N/V/D, or bloody stools at this time.  Readmitted for further work up and medical management.    7/25 underwent EGD this am capsule administered this afternoon npo- no stool today 9.5/29.3  7/25 Enteroscopy Finding: The examined esophagus was normal. The entire examined stomach was normal. There was no evidence of significant pathology in the entire examined duodenum. Two angioectasias with no bleeding were found in the proximal jejunum. Coagulation for  hemostasis using bipolar probe was successful. Impression: Normal esophagus, stomach and duodenum. Two non-bleeding angioectasias in the jejunum. Treated with bipolar cautery.  No specimens collected.  7/26 Melena stool x 1; CBC series. Heparin gtt resumed as per CHF team. Plan to bridge with coumadin if no further bleeding; Awaiting Capsule study results.    7/27 Small bowel capsule showing 2 small angioectasias in distal small bowel without active bleeding or stigmata of recent bleeding. No signs of overt bleeding currently. H/H stable. Coumadin resumed today by HF.

## 2017-07-27 NOTE — PROGRESS NOTE ADULT - SUBJECTIVE AND OBJECTIVE BOX
Subjective: Pt states "I'm doing fine today" denies any CP, SOB, N/V and palpitations. No acute events overnight.     VITAL SIGNS    Telemetry: 80 - 100 SR   Vital Signs Last 24 Hrs  T(F): 97.7 (07-27-17 @ 15:16), Max: 98.6 (07-27-17 @ 07:00)  HR: 89 (07-27-17 @ 15:16) (77 - 93)  RR: 18 (07-27-17 @ 15:16) (18 - 18)  SpO2: 99% (07-27-17 @ 15:16) (99% - 100%)           07-27 @ 07:01  -  07-27 @ 15:37  --------------------------------------------------------  IN: 420 mL / OUT: 200 mL / NET: 220 mL      PHYSICAL EXAM    Neurology: A&Ox3, nonfocal, no gross deficits  CV : RRR+S1S2  Sternal Wound: MSI CDI , Stable  Lungs: Respirations non-labored, B/L BS  Abdomen: Soft, NT/ND, +BSx4Q, last BM   (-)N/V/D  : Voiding without difficulty  Extremities: B/L LE edema, negative calf tenderness, +PP , SVG incision           MEDICATIONS  sodium chloride 0.9% lock flush 3 milliLiter(s) IV Push every 8 hours  spironolactone 25 milliGRAM(s) Oral daily  amiodarone    Tablet 200 milliGRAM(s) Oral daily  acetaminophen   Tablet 650 milliGRAM(s) Oral every 6 hours PRN  mexiletine 150 milliGRAM(s) Oral two times a day  diphenhydrAMINE   Capsule 25 milliGRAM(s) Oral every 4 hours PRN  colchicine 0.6 milliGRAM(s) Oral daily  QUEtiapine 50 milliGRAM(s) Oral at bedtime  carvedilol 6.25 milliGRAM(s) Oral every 12 hours  hydrocortisone 1% Cream 1 Application(s) Topical three times a day  furosemide    Tablet 20 milliGRAM(s) Oral daily  docusate sodium 100 milliGRAM(s) Oral three times a day  folic acid 1 milliGRAM(s) Oral daily  ascorbic acid 500 milliGRAM(s) Oral daily  pantoprazole  Injectable 40 milliGRAM(s) IV Push two times a day  heparin  Infusion 1100 Unit(s)/Hr IV Continuous <Continuous>      Physical Therapy Rec:   Home  [  ]   Home w/ PT  [  ]  Rehab  [  ]    Discussed with Cardiothoracic Team at AM rounds. Subjective: Pt states "I'm doing fine today" denies any CP, SOB, N/V and palpitations. No acute events overnight.     VITAL SIGNS    Telemetry: 80 - 100 SR   Vital Signs Last 24 Hrs  T(F): 97.7, Max: 98.6   HR: 89   RR: 18   SpO2: 99% RA       07-27 @ 07:01  -  07-27 @ 15:37  --------------------------------------------------------  IN: 420 mL / OUT: 200 mL / NET: 220 mL      PHYSICAL EXAM  Neurology: A&Ox3, nonfocal, no gross deficits  CV : +LVAD Hum  Lungs: Respirations non-labored, B/L BS CTA  Abdomen: Soft, NT/ND, +BSx4Q, last BM on 7/26 (-)N/V/D                 Driveline DSG CDI no drainage  : Voiding without difficulty  Extremities: B/L LE no edema, negative calf tenderness, +PP           MEDICATIONS  spironolactone 25 milliGRAM(s) Oral daily  amiodarone    Tablet 200 milliGRAM(s) Oral daily  acetaminophen   Tablet 650 milliGRAM(s) Oral every 6 hours PRN  mexiletine 150 milliGRAM(s) Oral two times a day  diphenhydrAMINE   Capsule 25 milliGRAM(s) Oral every 4 hours PRN  colchicine 0.6 milliGRAM(s) Oral daily  QUEtiapine 50 milliGRAM(s) Oral at bedtime  carvedilol 6.25 milliGRAM(s) Oral every 12 hours  hydrocortisone 1% Cream 1 Application(s) Topical three times a day  furosemide    Tablet 20 milliGRAM(s) Oral daily  docusate sodium 100 milliGRAM(s) Oral three times a day  folic acid 1 milliGRAM(s) Oral daily  ascorbic acid 500 milliGRAM(s) Oral daily  pantoprazole  Injectable 40 milliGRAM(s) IV Push two times a day  heparin  Infusion 1100 Unit(s)/Hr IV Continuous <Continuous>      Physical Therapy Rec:   Home PT with rolling walker    Discussed with Cardiothoracic Team at AM rounds.

## 2017-07-27 NOTE — PROGRESS NOTE ADULT - ASSESSMENT
IMP:    Melena, now resolved - s/p push enteroscopy on 7/25 with APC of 2 non-bleeding angioectasias. S/p small bowel capsule showing 2 small angioectasias in distal small bowel without active bleeding or stigmata of recent bleeding. No signs of overt bleeding currently. H/H stable. No pRBC transfusions required over past 48 hours.     PLAN:  - Monitor H/H   - Daily PPI  - Consider octreotide therapy for recurrent small bowel angioectasias as pt has recurrent angioectasias despite multiple endoscopic interventions.   - please call back with questions

## 2017-07-28 ENCOUNTER — TRANSCRIPTION ENCOUNTER (OUTPATIENT)
Age: 67
End: 2017-07-28

## 2017-07-28 DIAGNOSIS — I49.9 CARDIAC ARRHYTHMIA, UNSPECIFIED: ICD-10-CM

## 2017-07-28 LAB
ANION GAP SERPL CALC-SCNC: 13 MMOL/L — SIGNIFICANT CHANGE UP (ref 5–17)
APTT BLD: 54 SEC — HIGH (ref 27.5–37.4)
APTT BLD: 70.5 SEC — HIGH (ref 27.5–37.4)
BUN SERPL-MCNC: 19 MG/DL — SIGNIFICANT CHANGE UP (ref 7–23)
CALCIUM SERPL-MCNC: 9.4 MG/DL — SIGNIFICANT CHANGE UP (ref 8.4–10.5)
CHLORIDE SERPL-SCNC: 103 MMOL/L — SIGNIFICANT CHANGE UP (ref 96–108)
CO2 SERPL-SCNC: 21 MMOL/L — LOW (ref 22–31)
CREAT SERPL-MCNC: 1.09 MG/DL — SIGNIFICANT CHANGE UP (ref 0.5–1.3)
GLUCOSE SERPL-MCNC: 103 MG/DL — HIGH (ref 70–99)
HCT VFR BLD CALC: 31.7 % — LOW (ref 39–50)
HGB BLD-MCNC: 9.8 G/DL — LOW (ref 13–17)
INR BLD: 1.3 RATIO — HIGH (ref 0.88–1.16)
LDH SERPL L TO P-CCNC: 266 U/L — HIGH (ref 50–242)
MCHC RBC-ENTMCNC: 28.8 PG — SIGNIFICANT CHANGE UP (ref 27–34)
MCHC RBC-ENTMCNC: 30.8 GM/DL — LOW (ref 32–36)
MCV RBC AUTO: 93.7 FL — SIGNIFICANT CHANGE UP (ref 80–100)
PLATELET # BLD AUTO: 330 K/UL — SIGNIFICANT CHANGE UP (ref 150–400)
POTASSIUM SERPL-MCNC: 4.7 MMOL/L — SIGNIFICANT CHANGE UP (ref 3.5–5.3)
POTASSIUM SERPL-SCNC: 4.7 MMOL/L — SIGNIFICANT CHANGE UP (ref 3.5–5.3)
PROTHROM AB SERPL-ACNC: 14.1 SEC — HIGH (ref 9.8–12.7)
RBC # BLD: 3.38 M/UL — LOW (ref 4.2–5.8)
RBC # FLD: 16.8 % — HIGH (ref 10.3–14.5)
SODIUM SERPL-SCNC: 137 MMOL/L — SIGNIFICANT CHANGE UP (ref 135–145)
WBC # BLD: 10.9 K/UL — HIGH (ref 3.8–10.5)
WBC # FLD AUTO: 10.9 K/UL — HIGH (ref 3.8–10.5)

## 2017-07-28 PROCEDURE — 93750 INTERROGATION VAD IN PERSON: CPT

## 2017-07-28 PROCEDURE — 99233 SBSQ HOSP IP/OBS HIGH 50: CPT | Mod: 25

## 2017-07-28 PROCEDURE — 99233 SBSQ HOSP IP/OBS HIGH 50: CPT | Mod: GC

## 2017-07-28 RX ORDER — MEXILETINE HYDROCHLORIDE 150 MG/1
150 CAPSULE ORAL ONCE
Qty: 0 | Refills: 0 | Status: COMPLETED | OUTPATIENT
Start: 2017-07-28 | End: 2017-07-28

## 2017-07-28 RX ORDER — WARFARIN SODIUM 2.5 MG/1
3 TABLET ORAL ONCE
Qty: 0 | Refills: 0 | Status: COMPLETED | OUTPATIENT
Start: 2017-07-28 | End: 2017-07-28

## 2017-07-28 RX ORDER — MEXILETINE HYDROCHLORIDE 150 MG/1
150 CAPSULE ORAL EVERY 8 HOURS
Qty: 0 | Refills: 0 | Status: DISCONTINUED | OUTPATIENT
Start: 2017-07-28 | End: 2017-07-31

## 2017-07-28 RX ADMIN — SODIUM CHLORIDE 3 MILLILITER(S): 9 INJECTION INTRAMUSCULAR; INTRAVENOUS; SUBCUTANEOUS at 21:23

## 2017-07-28 RX ADMIN — SODIUM CHLORIDE 3 MILLILITER(S): 9 INJECTION INTRAMUSCULAR; INTRAVENOUS; SUBCUTANEOUS at 12:22

## 2017-07-28 RX ADMIN — Medication 500 MILLIGRAM(S): at 12:33

## 2017-07-28 RX ADMIN — Medication 100 MILLIGRAM(S): at 21:18

## 2017-07-28 RX ADMIN — Medication 20 MILLIGRAM(S): at 05:21

## 2017-07-28 RX ADMIN — PANTOPRAZOLE SODIUM 40 MILLIGRAM(S): 20 TABLET, DELAYED RELEASE ORAL at 17:37

## 2017-07-28 RX ADMIN — HEPARIN SODIUM 10 UNIT(S)/HR: 5000 INJECTION INTRAVENOUS; SUBCUTANEOUS at 12:34

## 2017-07-28 RX ADMIN — MEXILETINE HYDROCHLORIDE 150 MILLIGRAM(S): 150 CAPSULE ORAL at 05:22

## 2017-07-28 RX ADMIN — SODIUM CHLORIDE 3 MILLILITER(S): 9 INJECTION INTRAMUSCULAR; INTRAVENOUS; SUBCUTANEOUS at 05:22

## 2017-07-28 RX ADMIN — Medication 100 MILLIGRAM(S): at 12:33

## 2017-07-28 RX ADMIN — Medication 1 APPLICATION(S): at 05:22

## 2017-07-28 RX ADMIN — AMIODARONE HYDROCHLORIDE 200 MILLIGRAM(S): 400 TABLET ORAL at 05:21

## 2017-07-28 RX ADMIN — CARVEDILOL PHOSPHATE 6.25 MILLIGRAM(S): 80 CAPSULE, EXTENDED RELEASE ORAL at 17:37

## 2017-07-28 RX ADMIN — Medication 1 APPLICATION(S): at 12:33

## 2017-07-28 RX ADMIN — CARVEDILOL PHOSPHATE 6.25 MILLIGRAM(S): 80 CAPSULE, EXTENDED RELEASE ORAL at 05:21

## 2017-07-28 RX ADMIN — Medication 100 MILLIGRAM(S): at 05:21

## 2017-07-28 RX ADMIN — SPIRONOLACTONE 25 MILLIGRAM(S): 25 TABLET, FILM COATED ORAL at 05:21

## 2017-07-28 RX ADMIN — MEXILETINE HYDROCHLORIDE 150 MILLIGRAM(S): 150 CAPSULE ORAL at 13:16

## 2017-07-28 RX ADMIN — QUETIAPINE FUMARATE 50 MILLIGRAM(S): 200 TABLET, FILM COATED ORAL at 21:18

## 2017-07-28 RX ADMIN — Medication 1 MILLIGRAM(S): at 12:33

## 2017-07-28 RX ADMIN — Medication 1 APPLICATION(S): at 21:23

## 2017-07-28 RX ADMIN — MEXILETINE HYDROCHLORIDE 150 MILLIGRAM(S): 150 CAPSULE ORAL at 21:18

## 2017-07-28 RX ADMIN — HEPARIN SODIUM 10 UNIT(S)/HR: 5000 INJECTION INTRAVENOUS; SUBCUTANEOUS at 01:48

## 2017-07-28 RX ADMIN — PANTOPRAZOLE SODIUM 40 MILLIGRAM(S): 20 TABLET, DELAYED RELEASE ORAL at 05:22

## 2017-07-28 RX ADMIN — Medication 0.6 MILLIGRAM(S): at 12:33

## 2017-07-28 RX ADMIN — WARFARIN SODIUM 3 MILLIGRAM(S): 2.5 TABLET ORAL at 21:18

## 2017-07-28 RX ADMIN — Medication 650 MILLIGRAM(S): at 12:33

## 2017-07-28 NOTE — PROGRESS NOTE ADULT - SUBJECTIVE AND OBJECTIVE BOX
Interval History:  Had run of slow NSVT 30 beats today, asymptomatic. No VAD alarms. No further melenic stools    Medications:  sodium chloride 0.9% lock flush 3 milliLiter(s) IV Push every 8 hours  spironolactone 25 milliGRAM(s) Oral daily  amiodarone    Tablet 200 milliGRAM(s) Oral daily  acetaminophen   Tablet 650 milliGRAM(s) Oral every 6 hours PRN  diphenhydrAMINE   Capsule 25 milliGRAM(s) Oral every 4 hours PRN  colchicine 0.6 milliGRAM(s) Oral daily  QUEtiapine 50 milliGRAM(s) Oral at bedtime  carvedilol 6.25 milliGRAM(s) Oral every 12 hours  hydrocortisone 1% Cream 1 Application(s) Topical three times a day  furosemide    Tablet 20 milliGRAM(s) Oral daily  docusate sodium 100 milliGRAM(s) Oral three times a day  folic acid 1 milliGRAM(s) Oral daily  ascorbic acid 500 milliGRAM(s) Oral daily  pantoprazole  Injectable 40 milliGRAM(s) IV Push two times a day  heparin  Infusion 1000 Unit(s)/Hr IV Continuous <Continuous>  mexiletine 150 milliGRAM(s) Oral every 8 hours  warfarin 3 milliGRAM(s) Oral once    Vitals:  T(C): 36.6 (17 @ 17:39), Max: 36.8 (17 @ 05:00)  HR: 90 (17 @ 17:39) (70 - 90)  BP: 105/72 (17 @ 17:39) (93/56 - 106/78)  BP(mean): 83 (17 @ 17:39) (68 - 88)  ABP: --  ABP(mean): --  RR: 18 (17 @ 17:39) (17 - 18)  SpO2: 99% (17 @ 17:39) (95% - 100%)    Daily     Daily Weight in k.5 (2017 13:38)    I&O's Summary    2017 07:01  -  2017 07:00  --------------------------------------------------------  IN: 988 mL / OUT: 1700 mL / NET: -712 mL    2017 07:01  -  2017 20:43  --------------------------------------------------------  IN: 832 mL / OUT: 1550 mL / NET: -718 mL        Physical Exam:  Appearance: No Acute Distress  HEENT: No JVD  Cardiovascular: LVAD hum  Respiratory: Clear to auscultation bilaterally  Gastrointestinal: Soft, Non-tender	  Skin: No cyanosis	  Neurologic: Non-focal  Extremities: No LE edema  Psychiatry: A & O x 3, Mood & affect appropriate    LVAD Interrogation:  No events    Labs:                        9.8    10.9  )-----------( 330      ( 2017 08:38 )             31.7         137  |  103  |  19  ----------------------------<  103<H>  4.7   |  21<L>  |  1.09    Ca    9.4      2017 08:38      PT/INR - ( 2017 08:38 )   PT: 14.1 sec;   INR: 1.30 ratio         PTT - ( 2017 08:38 )  PTT:40.3 sec    Lactate Dehydrogenase, Serum: 266 U/L ( @ 08:38)          TELEMETRY: NSVT 30 beats, 130 bpm

## 2017-07-28 NOTE — PROGRESS NOTE ADULT - ASSESSMENT
This is 67 year old man with PMHx of CHF, NICM, s/p 6/12/17 HM2 LVAD post op prolonged 2/2 GIB s/p EGD finding: non-bleeding small superficial gastric ulcer, Small bowel capsule showing 2 non-bleeding angiodysplastic lesions in the jejunum s/p argon plasma coagulation (APC); Clips were placed. Patient discharged to [facility] rehab on 7/17. Now presents to [facility] on 7/24 from rehab facility with c/o melena x 3 days. Found to have positive stool guaiac, and anemic with an H&H 6.9 and 20.8 Transfused on 7/23 with 2 unit PRBC's. Denies abdominal pain, N/V/D, or bloody stools at this time.  Readmitted for further work up and medical management.    7/25 underwent EGD this am capsule administered this afternoon npo- no stool today 9.5/29.3  7/25 Enteroscopy Finding: The examined esophagus was normal. The entire examined stomach was normal. There was no evidence of significant pathology in the entire examined duodenum. Two angioectasias with no bleeding were found in the proximal jejunum. Coagulation for  hemostasis using bipolar probe was successful. Impression: Normal esophagus, stomach and duodenum. Two non-bleeding angioectasias in the jejunum. Treated with bipolar cautery.  No specimens collected.  7/26 Melena stool x 1; CBC series. Heparin gtt resumed as per CHF team. Plan to bridge with coumadin if no further bleeding; Awaiting Capsule study results.    7/27 Small bowel capsule showing 2 small angioectasias in distal small bowel without active bleeding or stigmata of recent bleeding. No signs of overt bleeding currently. H/H stable. Coumadin resumed today by HF.  7/28 Multiple episodes of WCT, eps re consulted, mexilitine increased to TID This is 67 year old man with PMHx of CHF, NICM, s/p 6/12/17 HM2 LVAD post op prolonged 2/2 GIB s/p EGD finding: non-bleeding small superficial gastric ulcer, Small bowel capsule showing 2 non-bleeding angiodysplastic lesions in the jejunum s/p argon plasma coagulation (APC); Clips were placed. Patient discharged to [facility] rehab on 7/17. Now presents to [facility] on 7/24 from rehab facility with c/o melena x 3 days. Found to have positive stool guaiac, and anemic with an H&H 6.9 and 20.8 Transfused on 7/23 with 2 unit PRBC's. Denies abdominal pain, N/V/D, or bloody stools at this time.  Readmitted for further work up and medical management.    7/25 underwent EGD this am capsule administered this afternoon npo- no stool today 9.5/29.3  7/25 Enteroscopy Finding: The examined esophagus was normal. The entire examined stomach was normal. There was no evidence of significant pathology in the entire examined duodenum. Two angioectasias with no bleeding were found in the proximal jejunum. Coagulation for  hemostasis using bipolar probe was successful. Impression: Normal esophagus, stomach and duodenum. Two non-bleeding angioectasias in the jejunum. Treated with bipolar cautery.  No specimens collected.  7/26 Melena stool x 1; CBC series. Heparin gtt resumed as per CHF team. Plan to bridge with coumadin if no further bleeding; Awaiting Capsule study results.    7/27 Small bowel capsule showing 2 small angioectasias in distal small bowel without active bleeding or stigmata of recent bleeding. No signs of overt bleeding currently. H/H stable. Coumadin resumed today by HF.  7/28 Multiple episodes of WCT, eps re consulted, mexilitine increased to TID

## 2017-07-28 NOTE — OCCUPATIONAL THERAPY INITIAL EVALUATION ADULT - RANGE OF MOTION EXAMINATION, UPPER EXTREMITY
bilateral UE Active ROM was WFL  (within functional limits)/arthritis in B hands especially in fingers

## 2017-07-28 NOTE — DISCHARGE NOTE ADULT - NS AS ACTIVITY OBS
Showering allowed/Walking-Outdoors allowed/Return to Work/School allowed/No Heavy lifting/straining/Stairs allowed/Walking-Indoors allowed

## 2017-07-28 NOTE — DISCHARGE NOTE ADULT - PLAN OF CARE
Complete Recovery 1. Daily Shower  2. Weight yourself daily and notify any weight gain greater than 2-3 pounds in 24 hours.  3. Regular diet - low fat, low cholesterol, no added salt.  4. Increase Activity as tolerated. 1. Daily Shower  2. Weight yourself daily and notify any weight gain greater than 2-3 pounds in 24 hours.  3. Regular diet - low fat, low cholesterol, no added salt.  4. Increase Activity as tolerated.  5. Follow up at LVAD clinic for PT/INR draw next week. Call to schedule appointment. Neva Chand NP to follow INR/ Coumadin dosing. 1. Daily Shower  2. Weight yourself daily and notify any weight gain greater than 2-3 pounds in 24 hours.  3. Regular diet - low fat, low cholesterol, no added salt.  4. Increase Activity as tolerated.  5. Follow up at LVAD clinic for PT/INR draw next week 8/10 with Neva Chand to follow INR/ Coumadin dosing. Call to confirm appointment.

## 2017-07-28 NOTE — PROGRESS NOTE ADULT - SUBJECTIVE AND OBJECTIVE BOX
VITAL SIGNS    Telemetry:      Vital Signs Last 24 Hrs  T(C): 36.4 (17 @ 09:37), Max: 36.8 (17 @ 05:00)  T(F): 97.6 (17 @ 09:37), Max: 98.2 (17 @ 05:00)  HR: 83 (17 @ 09:37) (70 - 89)  BP: 93/56 (17 @ 09:37) (93/56 - 102/69)  RR: 18 (17 @ 09:37) (17 - 18)  SpO2: 98% (17 @ 09:37) (95% - 100%)                    @ 07:01  -   @ 07:00  --------------------------------------------------------  IN: 988 mL / OUT: 1700 mL / NET: -712 mL     @ 07:01  -   @ 13:06  --------------------------------------------------------  IN: 420 mL / OUT: 350 mL / NET: 70 mL          Daily     Daily Weight in k.5 (2017 08:28)        CAPILLARY BLOOD GLUCOSE              Drains:     MS         [  ] Drainage:                 L Pleural  [  ]  Drainage:                R Pleural  [  ]  Drainage:    Pacing Wires        [  ]   Settings:                                  Isolated  [  ]    Coumadin    [ ] YES          [  ]      NO         Reason:                               PHYSICAL EXAM        Neurology: alert and oriented x 3, nonfocal, no gross deficits    CV :    Sternal Wound :  CDI , Stable    Lungs:    Abdomen: soft, nontender, nondistended, positive bowel sounds, last bowel movement     :               Extremities:               sodium chloride 0.9% lock flush 3 milliLiter(s) IV Push every 8 hours  spironolactone 25 milliGRAM(s) Oral daily  amiodarone    Tablet 200 milliGRAM(s) Oral daily  acetaminophen   Tablet 650 milliGRAM(s) Oral every 6 hours PRN  diphenhydrAMINE   Capsule 25 milliGRAM(s) Oral every 4 hours PRN  colchicine 0.6 milliGRAM(s) Oral daily  QUEtiapine 50 milliGRAM(s) Oral at bedtime  carvedilol 6.25 milliGRAM(s) Oral every 12 hours  hydrocortisone 1% Cream 1 Application(s) Topical three times a day  furosemide    Tablet 20 milliGRAM(s) Oral daily  docusate sodium 100 milliGRAM(s) Oral three times a day  folic acid 1 milliGRAM(s) Oral daily  ascorbic acid 500 milliGRAM(s) Oral daily  pantoprazole  Injectable 40 milliGRAM(s) IV Push two times a day  heparin  Infusion 1000 Unit(s)/Hr IV Continuous <Continuous>  mexiletine 150 milliGRAM(s) Oral once  mexiletine 150 milliGRAM(s) Oral every 8 hours                              Physical Therapy Rec:   Home  [  ]   Home w/ PT  [  ]  Rehab  [  ]    Discussed with Cardiothoracic Team at AM rounds. Im ok    VITAL SIGNS    Telemetry:      Vital Signs Last 24 Hrs  T(C): 36.4 (17 @ 09:37), Max: 36.8 (17 @ 05:00)  T(F): 97.6 (17 @ 09:37), Max: 98.2 (17 @ 05:00)  HR: 83 (17 @ 09:37) (70 - 89)  BP: 93/56 (17 @ 09:37) (93/56 - 102/69)  RR: 18 (17 @ 09:37) (17 - 18)  SpO2: 98% (17 @ 09:37) (95% - 100%)                    @ 07:01  -   @ 07:00  --------------------------------------------------------  IN: 988 mL / OUT: 1700 mL / NET: -712 mL     @ 07:01  -   @ 13:06  --------------------------------------------------------  IN: 420 mL / OUT: 350 mL / NET: 70 mL          Daily     Daily Weight in k.5 (2017 08:28)        CAPILLARY BLOOD GLUCOSE                      Coumadin    [x ] YES          [  ]      NO         Reason:     lvad                          PHYSICAL EXAM        Neurology: alert and oriented x 3, nonfocal, no gross deficits    CV : +hum    Sternal Wound :  CDI , Stable    Lungs: cta    Abdomen: soft, nontender, nondistended, positive bowel sounds, last bowel movement yesterday    :           voiding    Extremities:       -  edema, negative calve tenderness,           sodium chloride 0.9% lock flush 3 milliLiter(s) IV Push every 8 hours  spironolactone 25 milliGRAM(s) Oral daily  amiodarone    Tablet 200 milliGRAM(s) Oral daily  acetaminophen   Tablet 650 milliGRAM(s) Oral every 6 hours PRN  diphenhydrAMINE   Capsule 25 milliGRAM(s) Oral every 4 hours PRN  colchicine 0.6 milliGRAM(s) Oral daily  QUEtiapine 50 milliGRAM(s) Oral at bedtime  carvedilol 6.25 milliGRAM(s) Oral every 12 hours  hydrocortisone 1% Cream 1 Application(s) Topical three times a day  furosemide    Tablet 20 milliGRAM(s) Oral daily  docusate sodium 100 milliGRAM(s) Oral three times a day  folic acid 1 milliGRAM(s) Oral daily  ascorbic acid 500 milliGRAM(s) Oral daily  pantoprazole  Injectable 40 milliGRAM(s) IV Push two times a day  heparin  Infusion 1000 Unit(s)/Hr IV Continuous <Continuous>  mexiletine 150 milliGRAM(s) Oral once  mexiletine 150 milliGRAM(s) Oral every 8 hours                              Physical Therapy Rec:   Home  [  ]   Home w/ PT  [  ]  Rehab  [  ]    Discussed with Cardiothoracic Team at AM rounds.

## 2017-07-28 NOTE — DISCHARGE NOTE ADULT - ADDITIONAL INSTRUCTIONS
Follow up with Dr. Stahl next week at LVAD clinic at [facility], call [phone] to confirm appointment. Follow up next week at LVAD clinic at [facility], Call to schedule appointment. Follow up next week 8/10 at LVAD clinic at [facility], Call to confirm appointment.

## 2017-07-28 NOTE — PROGRESS NOTE ADULT - PROBLEM SELECTOR PLAN 1
1. Continue with current medication regimen  2. amiodarone Tablet 200 milliGRAM(s) Oral daily  3. Coreg 6.25 mg PO BID   4. mexiletine 150 milliGRAM(s) Oral two times a day  5. Diuresis on lasix 20 daily/ aldactone 25 daily.  6. Activity as tolerated   7. Plan D/C home one medically cleared  8. Resume Coumadin 3mg today. Goal INR 1.8 - 2.2.

## 2017-07-28 NOTE — OCCUPATIONAL THERAPY INITIAL EVALUATION ADULT - DIAGNOSIS, OT EVAL
Pt presents with decreased strength, fine motor manipulation, endurance which affects ADL/IADL performance.

## 2017-07-28 NOTE — OCCUPATIONAL THERAPY INITIAL EVALUATION ADULT - PLANNED THERAPY INTERVENTIONS, OT EVAL
transfer training/bed mobility training/fine motor coordination training/balance training/cognitive, visual perceptual/ADL retraining/IADL retraining/strengthening

## 2017-07-28 NOTE — PROGRESS NOTE ADULT - ASSESSMENT
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 LVAD working well without PI/power events. Repeat scope with angioectasias but no source found. Recurrent sustained VT, asymptomatic  - GI recs appreciated  - trend CBC; CBC stable so far on Heparin gtt; coumadin 3 mg tonight  - PPI Daily  - for MAPs, follow cuff pressures  - increase mexilitine to 150 mg q8h    [id number] for covering on call fellow after 5 pm

## 2017-07-28 NOTE — CONSULT NOTE ADULT - SUBJECTIVE AND OBJECTIVE BOX
Date of Admission: 7/28/2017    CHIEF COMPLAINT: Melena    HISTORY OF PRESENT ILLNESS: The Pt is a 68 y/o man with h/o of NICMP s/p LVAD (6/12/17 HMII, post-op course complicated by GIB, after which the patient was discharged to rehab on 7/17/2017 before presenting on 7/24/2017 with c/o melena. EP had been following previously for A-Fib and WCT.       Allergies    No Known Allergies    Intolerances    	    MEDICATIONS:  spironolactone 25 milliGRAM(s) Oral daily  amiodarone    Tablet 200 milliGRAM(s) Oral daily  carvedilol 6.25 milliGRAM(s) Oral every 12 hours  furosemide    Tablet 20 milliGRAM(s) Oral daily  heparin  Infusion 1000 Unit(s)/Hr IV Continuous <Continuous>  mexiletine 150 milliGRAM(s) Oral every 8 hours        acetaminophen   Tablet 650 milliGRAM(s) Oral every 6 hours PRN  diphenhydrAMINE   Capsule 25 milliGRAM(s) Oral every 4 hours PRN  QUEtiapine 50 milliGRAM(s) Oral at bedtime    docusate sodium 100 milliGRAM(s) Oral three times a day  pantoprazole  Injectable 40 milliGRAM(s) IV Push two times a day    colchicine 0.6 milliGRAM(s) Oral daily    hydrocortisone 1% Cream 1 Application(s) Topical three times a day  folic acid 1 milliGRAM(s) Oral daily  ascorbic acid 500 milliGRAM(s) Oral daily      PAST MEDICAL & SURGICAL HISTORY:  GIB (gastrointestinal bleeding)  Ventricular fibrillation: s/p AICD  PAF (paroxysmal atrial fibrillation): on xarelto  Non-Ischemic Cardiomyopathy  SVT (Supraventricular Tachycardia)  HTN  CHF (Congestive Heart Failure)  LVAD (left ventricular assist device) present  Status post left hip replacement  History of Prior Ablation Treatment: for afib  AICD (Automatic Cardioverter/Defibrillator) Present: [employer] with 1 [employer] lead4/1/09- explanted and replaced with Medtronic 2 leads on 9/2/09      FAMILY HISTORY:  No pertinent family history in first degree relatives      SOCIAL HISTORY: Non-contributory      REVIEW OF SYSTEMS:  General: no fatigue/malaise, weight loss/gain.  Skin: no rashes.  Ophthalmologic: no blurred vision, no loss of vision. 	  ENT: no sore throat, rhinorrhea, sinus congestion.  Respiratory: no SOB, cough or wheeze.  Gastrointestinal:  no N/V/D, no melena/hematemesis/hematochezia.  Genitourinary: no dysuria/hesitancy or hematuria.  Musculoskeletal: no myalgias or arthralgias.  Neurological: no changes in vision or hearing, no lightheadedness/dizziness, no syncope/near syncope	  Psychiatric: no unusual stress/anxiety.   Hematology/Lymphatics: no unusual bleeding, bruising and no lymphadenopathy.  Endocrine: no unusual thirst.   All others negative except as stated above and in HPI.    PHYSICAL EXAM:  T(C): 36.3 (07-28-17 @ 13:17), Max: 36.8 (07-28-17 @ 05:00)  HR: 84 (07-28-17 @ 13:17) (70 - 89)  BP: 106/78 (07-28-17 @ 13:17) (93/56 - 106/78)  RR: 18 (07-28-17 @ 13:17) (17 - 18)  SpO2: 100% (07-28-17 @ 13:17) (95% - 100%)  Wt(kg): --  I&O's Summary    27 Jul 2017 07:01  -  28 Jul 2017 07:00  --------------------------------------------------------  IN: 988 mL / OUT: 1700 mL / NET: -712 mL    28 Jul 2017 07:01  -  28 Jul 2017 14:49  --------------------------------------------------------  IN: 780 mL / OUT: 1350 mL / NET: -570 mL        Appearance: Normal	  HEENT:   Normal oral mucosa, PERRL, EOMI	  Lymphatic: No lymphadenopathy  Cardiovascular: Normal S1 S2, No JVD, No murmurs, No edema  Respiratory: Lungs clear to auscultation	  Psychiatry: A & O x 3, Mood & affect appropriate  Gastrointestinal:  Soft, Non-tender, + BS	  Skin: No rashes, No ecchymoses, No cyanosis	  Neurologic: Non-focal  Extremities: Normal range of motion, No clubbing, cyanosis or edema  Vascular: Peripheral pulses palpable 2+ bilaterally        LABS:	 	    CBC Full  -  ( 28 Jul 2017 08:38 )  WBC Count : 10.9 K/uL  Hemoglobin : 9.8 g/dL  Hematocrit : 31.7 %  Platelet Count - Automated : 330 K/uL  Mean Cell Volume : 93.7 fl  Mean Cell Hemoglobin : 28.8 pg  Mean Cell Hemoglobin Concentration : 30.8 gm/dL  Auto Neutrophil # : x  Auto Lymphocyte # : x  Auto Monocyte # : x  Auto Eosinophil # : x  Auto Basophil # : x  Auto Neutrophil % : x  Auto Lymphocyte % : x  Auto Monocyte % : x  Auto Eosinophil % : x  Auto Basophil % : x    07-28    137  |  103  |  19  ----------------------------<  103<H>  4.7   |  21<L>  |  1.09    Ca    9.4      28 Jul 2017 08:38    TELEMETRY: 	    ECG:  	      PREVIOUS DIAGNOSTIC TESTING:      Echocardiogram:    < from: Transthoracic Echocardiogram (07.12.17 @ 16:09) >  Conclusions:  LVAD output 9200 rpm. Limited study performed at the  bedside with heart failure team present. No changes made to  LVAD settings.  1. Aortic valve not well visualized; appears trileaflet.  The valve opens with some heart beats. Minimal aortic  regurgitation.  2. LVIDd=6.3 cm.  3. Endocardium not well visualized; severe left ventricular  systolic dysfunction. Abnormal motion of the  interventricular septum. LVAD inflow cannula present in the  apex is not well visualized.  4. The right ventricle is not well visualized; right  ventricle appears mildly enlarged with decreased right  ventricular systolic function. A device wire is noted in  the right heart.  	  ASSESSMENT/PLAN:  68 y/o man with h/o of NICMP s/p LVAD (6/12/17 HMII, post-op course complicated by GIB, after which the patient was discharged to rehab on 7/17/2017 before presenting on 7/24/2017 with c/o melena, with A-Fib and WCT	    1) A-Fib    2) WCT Date of Admission: 7/28/2017    CHIEF COMPLAINT: Melena    HISTORY OF PRESENT ILLNESS: The Pt is a 68 y/o man with h/o of NICMP s/p LVAD (6/12/17 HMII, post-op course complicated by GIB, after which the patient was discharged to rehab on 7/17/2017 before presenting on 7/24/2017 with c/o melena. EP had been following previously for A-Fib and WCT.       Allergies    No Known Allergies    Intolerances    	    MEDICATIONS:  spironolactone 25 milliGRAM(s) Oral daily  amiodarone    Tablet 200 milliGRAM(s) Oral daily  carvedilol 6.25 milliGRAM(s) Oral every 12 hours  furosemide    Tablet 20 milliGRAM(s) Oral daily  heparin  Infusion 1000 Unit(s)/Hr IV Continuous <Continuous>  mexiletine 150 milliGRAM(s) Oral every 8 hours        acetaminophen   Tablet 650 milliGRAM(s) Oral every 6 hours PRN  diphenhydrAMINE   Capsule 25 milliGRAM(s) Oral every 4 hours PRN  QUEtiapine 50 milliGRAM(s) Oral at bedtime    docusate sodium 100 milliGRAM(s) Oral three times a day  pantoprazole  Injectable 40 milliGRAM(s) IV Push two times a day    colchicine 0.6 milliGRAM(s) Oral daily    hydrocortisone 1% Cream 1 Application(s) Topical three times a day  folic acid 1 milliGRAM(s) Oral daily  ascorbic acid 500 milliGRAM(s) Oral daily      PAST MEDICAL & SURGICAL HISTORY:  GIB (gastrointestinal bleeding)  Ventricular fibrillation: s/p AICD  PAF (paroxysmal atrial fibrillation): on xarelto  Non-Ischemic Cardiomyopathy  SVT (Supraventricular Tachycardia)  HTN  CHF (Congestive Heart Failure)  LVAD (left ventricular assist device) present  Status post left hip replacement  History of Prior Ablation Treatment: for afib  AICD (Automatic Cardioverter/Defibrillator) Present: [employer] with 1 [employer] lead4/1/09- explanted and replaced with Medtronic 2 leads on 9/2/09      FAMILY HISTORY:  No pertinent family history in first degree relatives      SOCIAL HISTORY: Non-contributory      REVIEW OF SYSTEMS:  General: no fatigue/malaise, weight loss/gain.  Skin: no rashes.  Ophthalmologic: no blurred vision, no loss of vision. 	  ENT: no sore throat, rhinorrhea, sinus congestion.  Respiratory: no SOB, cough or wheeze.  Gastrointestinal:  no N/V/D, no melena/hematemesis/hematochezia.  Genitourinary: no dysuria/hesitancy or hematuria.  Musculoskeletal: no myalgias or arthralgias.  Neurological: no changes in vision or hearing, no lightheadedness/dizziness, no syncope/near syncope	  Psychiatric: no unusual stress/anxiety.   Hematology/Lymphatics: no unusual bleeding, bruising and no lymphadenopathy.  Endocrine: no unusual thirst.   All others negative except as stated above and in HPI.    PHYSICAL EXAM:  T(C): 36.3 (07-28-17 @ 13:17), Max: 36.8 (07-28-17 @ 05:00)  HR: 84 (07-28-17 @ 13:17) (70 - 89)  BP: 106/78 (07-28-17 @ 13:17) (93/56 - 106/78)  RR: 18 (07-28-17 @ 13:17) (17 - 18)  SpO2: 100% (07-28-17 @ 13:17) (95% - 100%)  Wt(kg): --  I&O's Summary    27 Jul 2017 07:01  -  28 Jul 2017 07:00  --------------------------------------------------------  IN: 988 mL / OUT: 1700 mL / NET: -712 mL    28 Jul 2017 07:01  -  28 Jul 2017 14:49  --------------------------------------------------------  IN: 780 mL / OUT: 1350 mL / NET: -570 mL        Appearance: Normal	  HEENT:   Normal oral mucosa, PERRL, EOMI	  Lymphatic: No lymphadenopathy  Cardiovascular: Normal S1 S2, No JVD, No murmurs, No edema  Respiratory: Lungs clear to auscultation	  Psychiatry: A & O x 3, Mood & affect appropriate  Gastrointestinal:  Soft, Non-tender, + BS	  Skin: No rashes, No ecchymoses, No cyanosis	  Neurologic: Non-focal  Extremities: Normal range of motion, No clubbing, cyanosis or edema  Vascular: Peripheral pulses palpable 2+ bilaterally        LABS:	 	    CBC Full  -  ( 28 Jul 2017 08:38 )  WBC Count : 10.9 K/uL  Hemoglobin : 9.8 g/dL  Hematocrit : 31.7 %  Platelet Count - Automated : 330 K/uL  Mean Cell Volume : 93.7 fl  Mean Cell Hemoglobin : 28.8 pg  Mean Cell Hemoglobin Concentration : 30.8 gm/dL  Auto Neutrophil # : x  Auto Lymphocyte # : x  Auto Monocyte # : x  Auto Eosinophil # : x  Auto Basophil # : x  Auto Neutrophil % : x  Auto Lymphocyte % : x  Auto Monocyte % : x  Auto Eosinophil % : x  Auto Basophil % : x    07-28    137  |  103  |  19  ----------------------------<  103<H>  4.7   |  21<L>  |  1.09    Ca    9.4      28 Jul 2017 08:38    PREVIOUS DIAGNOSTIC TESTING:      Echocardiogram:    < from: Transthoracic Echocardiogram (07.12.17 @ 16:09) >  Conclusions:  LVAD output 9200 rpm. Limited study performed at the  bedside with heart failure team present. No changes made to  LVAD settings.  1. Aortic valve not well visualized; appears trileaflet.  The valve opens with some heart beats. Minimal aortic  regurgitation.  2. LVIDd=6.3 cm.  3. Endocardium not well visualized; severe left ventricular  systolic dysfunction. Abnormal motion of the  interventricular septum. LVAD inflow cannula present in the  apex is not well visualized.  4. The right ventricle is not well visualized; right  ventricle appears mildly enlarged with decreased right  ventricular systolic function. A device wire is noted in  the right heart.  	  ASSESSMENT/PLAN:  68 y/o man with h/o of NICMP s/p LVAD (6/12/17 HMII, post-op course complicated by GIB, after which the patient was discharged to rehab on 7/17/2017 before presenting on 7/24/2017 with c/o melena, with A-Fib and WCT	    1) A-Fib with NSVT - stable, continue BB, Amiodarone, and Mexiletine at current doses, AC per primary team  - maintain K > 4, Mg > 2

## 2017-07-28 NOTE — DISCHARGE NOTE ADULT - HOSPITAL COURSE
68 y/o male with PMHx of CHF, NICM, s/p 6/12/17 HM2 LVAD post op prolonged 2/2 GIB s/p EGD finding: non-bleeding small superficial gastric ulcer, Small bowel capsule showing 2 non-bleeding angiodysplastic lesions in the jejunum s/p argon plasma coagulation (APC); Clips were placed. Patient discharged to [facility] rehab on 7/17. Now presents to [facility] on 7/24 from rehab facility with c/o melena x 3 days. Found to have positive stool guaiac, and anemic with an H&H 6.9 and 20.8 Transfused on 7/23 with 2 unit PRBC's. Denies abdominal pain, N/V/D, or bloody stools at this time.  Readmitted for further work up and medical management.    7/25 underwent EGD this am capsule administered this afternoon npo- no stool today 9.5/29.3  7/25 Enteroscopy Finding: The examined esophagus was normal. The entire examined stomach was normal. There was no evidence of significant pathology in the entire examined duodenum. Two angioectasias with no bleeding were found in the proximal jejunum. Coagulation for  hemostasis using bipolar probe was successful. Impression: Normal esophagus, stomach and duodenum. Two non-bleeding angioectasias in the jejunum. Treated with bipolar cautery.  No specimens collected.  7/26 Melena stool x 1; CBC series. Heparin gtt resumed as per CHF team. Plan to bridge with coumadin if no further bleeding.    7/27 Small bowel capsule showing 2 small angioectasias in distal small bowel without active bleeding or stigmata of recent bleeding. No signs of overt bleeding currently. H/H stable. Coumadin resumed today by HF.  7/28 Multiple episodes of WCT, eps re consulted, mexilitine increased to TID  7/29 Coumadin 3 ambulating  7/30 Coumadin 3 ambulating H&H stable 9.5/30.6 eager for discharge  7/31 Discharged home per Dr. Stahl. 68 y/o male with PMHx of CHF, NICM, s/p 6/12/17 HM2 LVAD post op prolonged 2/2 GIB s/p EGD finding: non-bleeding small superficial gastric ulcer, Small bowel capsule showing 2 non-bleeding angiodysplastic lesions in the jejunum s/p argon plasma coagulation (APC); Clips were placed. Patient discharged to [facility] rehab on 7/17. Presented to [facility] on 7/24 from rehab facility with c/o melena x 3 days. Found to have positive stool guaiac, and anemic with an H&H 6.9 and 20.8 Transfused on 7/23 with 2 unit PRBC's. Readmitted for further work up and medical management.    7/25 underwent EGD this am capsule administered this afternoon- no stool today 9.5/29.3  7/25 Enteroscopy Finding: The examined esophagus was normal. The entire examined stomach was normal. There was no evidence of significant pathology in the entire examined duodenum. Two angioectasias with no bleeding were found in the proximal jejunum. Coagulation for  hemostasis using bipolar probe was successful. Impression: Normal esophagus, stomach and duodenum. Two non-bleeding angioectasias in the jejunum. Treated with bipolar cautery.  No specimens collected.  7/26 Melena stool x 1; CBC series. Heparin gtt resumed as per CHF team. Plan to bridge with coumadin if no further bleeding.    7/27 Small bowel capsule showing 2 small angioectasias in distal small bowel without active bleeding or stigmata of recent bleeding. No signs of overt bleeding currently. H/H stable. Coumadin resumed today by HF.  7/28 Multiple episodes of WCT, eps re consulted, mexilitine increased to TID  7/29 Coumadin 3 ambulating  7/30 Coumadin 3 ambulating H&H stable 9.5/30.6 eager for discharge  7/31 Discharged home per Dr. Stahl.

## 2017-07-28 NOTE — DISCHARGE NOTE ADULT - MEDICATION SUMMARY - MEDICATIONS TO CHANGE
I will SWITCH the dose or number of times a day I take the medications listed below when I get home from the hospital:    mexiletine 150 mg oral capsule  -- 1 cap(s) by mouth 2 times a day    spironolactone 25 mg oral tablet  -- 0.5 tab(s) by mouth once a day    pantoprazole 40 mg oral delayed release tablet  -- 1 tab(s) by mouth once a day (before a meal) I will SWITCH the dose or number of times a day I take the medications listed below when I get home from the hospital:    mexiletine 150 mg oral capsule  -- 1 cap(s) by mouth 2 times a day    spironolactone 25 mg oral tablet  -- 0.5 tab(s) by mouth once a day

## 2017-07-28 NOTE — DISCHARGE NOTE ADULT - CARE PROVIDERS DIRECT ADDRESSES
[email or web address]

## 2017-07-28 NOTE — DISCHARGE NOTE ADULT - PATIENT PORTAL LINK FT
“You can access the Follow[employer] Patient Portal, offered by [facility], by registering with the following website: [email or web address]”

## 2017-07-28 NOTE — OCCUPATIONAL THERAPY INITIAL EVALUATION ADULT - PERTINENT HX OF CURRENT PROBLEM, REHAB EVAL
68 yo M s/p 6/12/17 HM2 LVAD post op prolonged 2/2 GIB, discharged to [facility] on 7/17. Now presents to [facility] on 7/24 from rehab facility with c/o melena x 3 days. Found to have positive stool guaiac, and anemic with an H&H 6.9 and 20.8 Transfused on 7/23 with 2 unit PRBC's See below

## 2017-07-28 NOTE — DISCHARGE NOTE ADULT - HOME CARE AGENCY
[facility] [phone] for RN assessment and P/T evaluation. start of care the day after discharge

## 2017-07-28 NOTE — DISCHARGE NOTE ADULT - CARE PROVIDER_API CALL
Edil Stahl (MD; MPH), Adv Heart Fail Trnsplnt Cardio; Cardiology; Internal Medicine  [address] [place] [address]  Phone: [phone]  Fax: [phone] Edil Stahl (MD; MPH), Adv Heart Fail Trnsplnt Cardio; Cardiology; Internal Medicine  [address] [place] [id number]  Phone: [phone]  Fax: [phone]    Neva Chand (NP; RN), NP in Adult Health  [address] [place] [address]  Phone: [phone]  Fax: [phone]

## 2017-07-28 NOTE — DISCHARGE NOTE ADULT - OTHER SIGNIFICANT FINDINGS
Neurology: A&Ox3, nonfocal, no gross deficits  CV : +LVAD Hum  Lungs: Respirations non-labored, B/L BS CTA  Abdomen: Soft, NT/ND, +BSx4Q, last BM on 7/30 (-)N/V/D                 Driveline DSG CDI no drainage  : Voiding without difficulty  Extremities: B/L LE no edema, negative calf tenderness, +PP

## 2017-07-28 NOTE — DISCHARGE NOTE ADULT - MEDICATION SUMMARY - MEDICATIONS TO TAKE
I will START or STAY ON the medications listed below when I get home from the hospital:    spironolactone 25 mg oral tablet  -- 1 tab(s) by mouth once a day  -- Indication: For diuretic    acetaminophen 325 mg oral tablet  -- 2 tab(s) by mouth every 6 hours, As needed, Mild Pain (1 - 3)  -- Indication: For pain    lisinopril 2.5 mg oral tablet  -- 1 tab(s) by mouth once a day  -- Indication: For blood pressure    amiodarone 200 mg oral tablet  -- 1 tab(s) by mouth once a day  -- Indication: For Arrhythmia, ventricular    mexiletine 150 mg oral capsule  -- 1 cap(s) by mouth every 8 hours  -- Indication: For Arrhythmia, ventricular    warfarin 3 mg oral tablet  -- 1 tab(s) by mouth once a day (at bedtime)  -- Indication: For blood thinner    diphenhydrAMINE 25 mg oral capsule  -- 1 cap(s) by mouth every 4 hours, As needed, Rash and/or Itching  -- Indication: For itching    colchicine 0.6 mg oral tablet  -- 1 tab(s) by mouth once a day  -- Indication: For Gout    QUEtiapine 50 mg oral tablet  -- 1 tab(s) by mouth once a day (at bedtime)  -- Indication: For insomnia    carvedilol 6.25 mg oral tablet  -- 1 tab(s) by mouth every 12 hours  -- Indication: For heart rate    hydrocortisone 1% topical cream  -- 1 application on skin 3 times a day  -- Indication: For itching    furosemide 20 mg oral tablet  -- 1 tab(s) by mouth once a day  -- Indication: For diuretic    docusate sodium 100 mg oral capsule  -- 1 cap(s) by mouth 3 times a day  -- Indication: For stool softener    pantoprazole 40 mg oral delayed release tablet  -- 1 tab(s) by mouth 2 times a day  -- Indication: For GIB (gastrointestinal bleeding)    ascorbic acid 500 mg oral tablet  -- 1 tab(s) by mouth once a day  -- Indication: For Anemia    folic acid 1 mg oral tablet  -- 1 tab(s) by mouth once a day  -- Indication: For Anemia I will START or STAY ON the medications listed below when I get home from the hospital:    spironolactone 25 mg oral tablet  -- 1 tab(s) by mouth once a day  -- Indication: For diuretic    acetaminophen 325 mg oral tablet  -- 2 tab(s) by mouth every 6 hours, As needed, Mild Pain (1 - 3)  -- Indication: For pain    lisinopril 2.5 mg oral tablet  -- 1 tab(s) by mouth once a day  -- Indication: For blood pressure    amiodarone 200 mg oral tablet  -- 1 tab(s) by mouth once a day  -- Indication: For Arrhythmia, ventricular    mexiletine 150 mg oral capsule  -- 1 cap(s) by mouth every 8 hours  -- Indication: For Arrhythmia, ventricular    warfarin 3 mg oral tablet  -- 1 tab(s) by mouth once a day (at bedtime)  -- Indication: For blood thinner    diphenhydrAMINE 25 mg oral capsule  -- 1 cap(s) by mouth every 4 hours, As needed, Rash and/or Itching  -- Indication: For itching    colchicine 0.6 mg oral tablet  -- 1 tab(s) by mouth once a day  -- Indication: For Gout    QUEtiapine 50 mg oral tablet  -- 1 tab(s) by mouth once a day (at bedtime)  -- Indication: For insomnia    carvedilol 6.25 mg oral tablet  -- 1 tab(s) by mouth every 12 hours  -- Indication: For heart rate    hydrocortisone 1% topical cream  -- 1 application on skin 3 times a day  -- Indication: For itching    furosemide 20 mg oral tablet  -- 1 tab(s) by mouth once a day  -- Indication: For diuretic    docusate sodium 100 mg oral capsule  -- 1 cap(s) by mouth 3 times a day  -- Indication: For stool softener    pantoprazole 40 mg oral delayed release tablet  -- 1 tab(s) by mouth once a day  -- Indication: For GIB (gastrointestinal bleeding)    ascorbic acid 500 mg oral tablet  -- 1 tab(s) by mouth once a day  -- Indication: For Anemia    folic acid 1 mg oral tablet  -- 1 tab(s) by mouth once a day  -- Indication: For Anemia

## 2017-07-28 NOTE — DISCHARGE NOTE ADULT - CARE PLAN
Principal Discharge DX:	GIB (gastrointestinal bleeding)  Goal:	Complete Recovery  Instructions for follow-up, activity and diet:	1. Daily Shower  2. Weight yourself daily and notify any weight gain greater than 2-3 pounds in 24 hours.  3. Regular diet - low fat, low cholesterol, no added salt.  4. Increase Activity as tolerated. Principal Discharge DX:	GIB (gastrointestinal bleeding)  Goal:	Complete Recovery  Instructions for follow-up, activity and diet:	1. Daily Shower  2. Weight yourself daily and notify any weight gain greater than 2-3 pounds in 24 hours.  3. Regular diet - low fat, low cholesterol, no added salt.  4. Increase Activity as tolerated.  5. Follow up at LVAD clinic for PT/INR draw next week. Call to schedule appointment. Neva Chand NP to follow INR/ Coumadin dosing. Principal Discharge DX:	GIB (gastrointestinal bleeding)  Goal:	Complete Recovery  Instructions for follow-up, activity and diet:	1. Daily Shower  2. Weight yourself daily and notify any weight gain greater than 2-3 pounds in 24 hours.  3. Regular diet - low fat, low cholesterol, no added salt.  4. Increase Activity as tolerated.  5. Follow up at LVAD clinic for PT/INR draw next week 8/10 with Neva Chand to follow INR/ Coumadin dosing. Call to confirm appointment.

## 2017-07-29 LAB
ANION GAP SERPL CALC-SCNC: 14 MMOL/L — SIGNIFICANT CHANGE UP (ref 5–17)
APTT BLD: 64.2 SEC — HIGH (ref 27.5–37.4)
BUN SERPL-MCNC: 17 MG/DL — SIGNIFICANT CHANGE UP (ref 7–23)
CALCIUM SERPL-MCNC: 9.2 MG/DL — SIGNIFICANT CHANGE UP (ref 8.4–10.5)
CHLORIDE SERPL-SCNC: 105 MMOL/L — SIGNIFICANT CHANGE UP (ref 96–108)
CO2 SERPL-SCNC: 20 MMOL/L — LOW (ref 22–31)
CREAT SERPL-MCNC: 1.02 MG/DL — SIGNIFICANT CHANGE UP (ref 0.5–1.3)
GLUCOSE SERPL-MCNC: 93 MG/DL — SIGNIFICANT CHANGE UP (ref 70–99)
HCT VFR BLD CALC: 29.7 % — LOW (ref 39–50)
HGB BLD-MCNC: 9.6 G/DL — LOW (ref 13–17)
INR BLD: 1.31 RATIO — HIGH (ref 0.88–1.16)
MCHC RBC-ENTMCNC: 29.9 PG — SIGNIFICANT CHANGE UP (ref 27–34)
MCHC RBC-ENTMCNC: 32.1 GM/DL — SIGNIFICANT CHANGE UP (ref 32–36)
MCV RBC AUTO: 93 FL — SIGNIFICANT CHANGE UP (ref 80–100)
PLATELET # BLD AUTO: 292 K/UL — SIGNIFICANT CHANGE UP (ref 150–400)
POTASSIUM SERPL-MCNC: 4.3 MMOL/L — SIGNIFICANT CHANGE UP (ref 3.5–5.3)
POTASSIUM SERPL-SCNC: 4.3 MMOL/L — SIGNIFICANT CHANGE UP (ref 3.5–5.3)
PROTHROM AB SERPL-ACNC: 14.4 SEC — HIGH (ref 9.8–12.7)
RBC # BLD: 3.2 M/UL — LOW (ref 4.2–5.8)
RBC # FLD: 16.7 % — HIGH (ref 10.3–14.5)
SODIUM SERPL-SCNC: 139 MMOL/L — SIGNIFICANT CHANGE UP (ref 135–145)
WBC # BLD: 10.7 K/UL — HIGH (ref 3.8–10.5)
WBC # FLD AUTO: 10.7 K/UL — HIGH (ref 3.8–10.5)

## 2017-07-29 RX ORDER — WARFARIN SODIUM 2.5 MG/1
3 TABLET ORAL ONCE
Qty: 0 | Refills: 0 | Status: COMPLETED | OUTPATIENT
Start: 2017-07-29 | End: 2017-07-29

## 2017-07-29 RX ORDER — HEPARIN SODIUM 5000 [USP'U]/ML
1000 INJECTION INTRAVENOUS; SUBCUTANEOUS
Qty: 25000 | Refills: 0 | Status: DISCONTINUED | OUTPATIENT
Start: 2017-07-29 | End: 2017-07-29

## 2017-07-29 RX ORDER — HEPARIN SODIUM 5000 [USP'U]/ML
1100 INJECTION INTRAVENOUS; SUBCUTANEOUS
Qty: 25000 | Refills: 0 | Status: DISCONTINUED | OUTPATIENT
Start: 2017-07-29 | End: 2017-07-31

## 2017-07-29 RX ADMIN — WARFARIN SODIUM 3 MILLIGRAM(S): 2.5 TABLET ORAL at 22:02

## 2017-07-29 RX ADMIN — SPIRONOLACTONE 25 MILLIGRAM(S): 25 TABLET, FILM COATED ORAL at 06:00

## 2017-07-29 RX ADMIN — PANTOPRAZOLE SODIUM 40 MILLIGRAM(S): 20 TABLET, DELAYED RELEASE ORAL at 17:34

## 2017-07-29 RX ADMIN — HEPARIN SODIUM 11 UNIT(S)/HR: 5000 INJECTION INTRAVENOUS; SUBCUTANEOUS at 10:10

## 2017-07-29 RX ADMIN — Medication 1 APPLICATION(S): at 06:03

## 2017-07-29 RX ADMIN — CARVEDILOL PHOSPHATE 6.25 MILLIGRAM(S): 80 CAPSULE, EXTENDED RELEASE ORAL at 06:01

## 2017-07-29 RX ADMIN — MEXILETINE HYDROCHLORIDE 150 MILLIGRAM(S): 150 CAPSULE ORAL at 13:10

## 2017-07-29 RX ADMIN — Medication 100 MILLIGRAM(S): at 22:02

## 2017-07-29 RX ADMIN — MEXILETINE HYDROCHLORIDE 150 MILLIGRAM(S): 150 CAPSULE ORAL at 06:01

## 2017-07-29 RX ADMIN — AMIODARONE HYDROCHLORIDE 200 MILLIGRAM(S): 400 TABLET ORAL at 06:01

## 2017-07-29 RX ADMIN — Medication 20 MILLIGRAM(S): at 06:01

## 2017-07-29 RX ADMIN — Medication 500 MILLIGRAM(S): at 13:10

## 2017-07-29 RX ADMIN — Medication 100 MILLIGRAM(S): at 06:00

## 2017-07-29 RX ADMIN — CARVEDILOL PHOSPHATE 6.25 MILLIGRAM(S): 80 CAPSULE, EXTENDED RELEASE ORAL at 17:33

## 2017-07-29 RX ADMIN — QUETIAPINE FUMARATE 50 MILLIGRAM(S): 200 TABLET, FILM COATED ORAL at 22:02

## 2017-07-29 RX ADMIN — Medication 1 MILLIGRAM(S): at 13:10

## 2017-07-29 RX ADMIN — PANTOPRAZOLE SODIUM 40 MILLIGRAM(S): 20 TABLET, DELAYED RELEASE ORAL at 06:02

## 2017-07-29 RX ADMIN — Medication 100 MILLIGRAM(S): at 13:10

## 2017-07-29 RX ADMIN — SODIUM CHLORIDE 3 MILLILITER(S): 9 INJECTION INTRAMUSCULAR; INTRAVENOUS; SUBCUTANEOUS at 06:03

## 2017-07-29 RX ADMIN — Medication 0.6 MILLIGRAM(S): at 13:15

## 2017-07-29 RX ADMIN — SODIUM CHLORIDE 3 MILLILITER(S): 9 INJECTION INTRAMUSCULAR; INTRAVENOUS; SUBCUTANEOUS at 22:03

## 2017-07-29 RX ADMIN — HEPARIN SODIUM 10 UNIT(S)/HR: 5000 INJECTION INTRAVENOUS; SUBCUTANEOUS at 08:10

## 2017-07-29 RX ADMIN — MEXILETINE HYDROCHLORIDE 150 MILLIGRAM(S): 150 CAPSULE ORAL at 22:02

## 2017-07-29 RX ADMIN — SODIUM CHLORIDE 3 MILLILITER(S): 9 INJECTION INTRAMUSCULAR; INTRAVENOUS; SUBCUTANEOUS at 13:12

## 2017-07-29 RX ADMIN — Medication 1 APPLICATION(S): at 13:11

## 2017-07-29 RX ADMIN — Medication 1 APPLICATION(S): at 22:03

## 2017-07-29 NOTE — PROGRESS NOTE ADULT - PROBLEM SELECTOR PLAN 1
1. Continue with current medication regimen  2. amiodarone Tablet 200 milliGRAM(s) Oral daily  3. Coreg 6.25 mg PO BID   4. mexiletine 150 milliGRAM(s) Oral three times a day  5. Diuresis on lasix 20 daily/ aldactone 25 daily.  6. Activity as tolerated   7. Plan D/C home one medically cleared  8. Resume Coumadin 3mg today. Goal INR 1.8 - 2.2.

## 2017-07-29 NOTE — PROVIDER CONTACT NOTE (OTHER) - SITUATION
Pt maintained on heparin gtt at 11cc/hr. PTT result is 64.2.   7/28/17 at 21:20 PTT Result: 70.5  7/29/17 at 05:29 PTT result: 64.2

## 2017-07-29 NOTE — PROGRESS NOTE ADULT - SUBJECTIVE AND OBJECTIVE BOX
Subjective hello no acute distress    VITAL SIGNS    Telemetry: [id number]     Vital Signs Last 24 Hrs  T(C): 36.5 (17 @ 10:10), Max: 36.9 (17 @ 05:54)  HR: 88 (17 @ 10:10) (84 - 92)  BP: 101/73 (17 @ 10:10) (95/68 - 109/75)  RR: 18 (17 @ 10:10) (18 - 19)  SpO2: 100% (17 @ 10:10) (98% - 100%)            @ 07:  -   @ 07:00  --------------------------------------------------------  IN: 1444 mL / OUT: 2750 mL / NET: -1306 mL     @ 07:  -   @ 12:23  --------------------------------------------------------  IN: 360 mL / OUT: 1100 mL / NET: -740 mL  Daily Weight in k.5 (2017 13:38)  LVAD  speed 9200  flow4.3  power5.1  Pulse index6.6    Coumadin    [ x] YES Reason:   LVAD      PHYSICAL EXAM    Neurology: alert and oriented x 3, nonfocal, no gross deficits  CV :LVAD  drive line CDI  Lungs:CTA  Abdomen: soft, nontender, nondistended, positive bowel sounds,   :     Voiding       Extremities:  +DP  Warm  well perfused       Physical Therapy Rec:  Home w/ PT  [ x ]     Discussed with Cardiothoracic Team at AM rounds.

## 2017-07-29 NOTE — PROGRESS NOTE ADULT - ASSESSMENT
This is 67 year old man with PMHx of CHF, NICM, s/p 6/12/17 HM2 LVAD post op prolonged 2/2 GIB s/p EGD finding: non-bleeding small superficial gastric ulcer, Small bowel capsule showing 2 non-bleeding angiodysplastic lesions in the jejunum s/p argon plasma coagulation (APC); Clips were placed. Patient discharged to [facility] rehab on 7/17. Now presents to [facility] on 7/24 from rehab facility with c/o melena x 3 days. Found to have positive stool guaiac, and anemic with an H&H 6.9 and 20.8 Transfused on 7/23 with 2 unit PRBC's. Denies abdominal pain, N/V/D, or bloody stools at this time.  Readmitted for further work up and medical management.    7/25 underwent EGD this am capsule administered this afternoon npo- no stool today 9.5/29.3  7/25 Enteroscopy Finding: The examined esophagus was normal. The entire examined stomach was normal. There was no evidence of significant pathology in the entire examined duodenum. Two angioectasias with no bleeding were found in the proximal jejunum. Coagulation for  hemostasis using bipolar probe was successful. Impression: Normal esophagus, stomach and duodenum. Two non-bleeding angioectasias in the jejunum. Treated with bipolar cautery.  No specimens collected.  7/26 Melena stool x 1; CBC series. Heparin gtt resumed as per CHF team. Plan to bridge with coumadin if no further bleeding; Awaiting Capsule study results.    7/27 Small bowel capsule showing 2 small angioectasias in distal small bowel without active bleeding or stigmata of recent bleeding. No signs of overt bleeding currently. H/H stable. Coumadin resumed today by HF.  7/28 Multiple episodes of WCT, eps re consulted, mexilitine increased to TID  7/29 Coumadin 3 ambulating

## 2017-07-30 LAB
ANION GAP SERPL CALC-SCNC: 12 MMOL/L — SIGNIFICANT CHANGE UP (ref 5–17)
APTT BLD: 59 SEC — HIGH (ref 27.5–37.4)
BUN SERPL-MCNC: 16 MG/DL — SIGNIFICANT CHANGE UP (ref 7–23)
CALCIUM SERPL-MCNC: 9 MG/DL — SIGNIFICANT CHANGE UP (ref 8.4–10.5)
CHLORIDE SERPL-SCNC: 102 MMOL/L — SIGNIFICANT CHANGE UP (ref 96–108)
CO2 SERPL-SCNC: 23 MMOL/L — SIGNIFICANT CHANGE UP (ref 22–31)
CREAT SERPL-MCNC: 1.1 MG/DL — SIGNIFICANT CHANGE UP (ref 0.5–1.3)
GLUCOSE SERPL-MCNC: 96 MG/DL — SIGNIFICANT CHANGE UP (ref 70–99)
HCT VFR BLD CALC: 30.6 % — LOW (ref 39–50)
HGB BLD-MCNC: 9.5 G/DL — LOW (ref 13–17)
INR BLD: 1.53 RATIO — HIGH (ref 0.88–1.16)
LDH SERPL L TO P-CCNC: 205 U/L — SIGNIFICANT CHANGE UP (ref 50–242)
MCHC RBC-ENTMCNC: 28.8 PG — SIGNIFICANT CHANGE UP (ref 27–34)
MCHC RBC-ENTMCNC: 31.1 GM/DL — LOW (ref 32–36)
MCV RBC AUTO: 92.6 FL — SIGNIFICANT CHANGE UP (ref 80–100)
PLATELET # BLD AUTO: 293 K/UL — SIGNIFICANT CHANGE UP (ref 150–400)
POTASSIUM SERPL-MCNC: 4.4 MMOL/L — SIGNIFICANT CHANGE UP (ref 3.5–5.3)
POTASSIUM SERPL-SCNC: 4.4 MMOL/L — SIGNIFICANT CHANGE UP (ref 3.5–5.3)
PROTHROM AB SERPL-ACNC: 16.7 SEC — HIGH (ref 9.8–12.7)
RBC # BLD: 3.31 M/UL — LOW (ref 4.2–5.8)
RBC # FLD: 16.6 % — HIGH (ref 10.3–14.5)
SODIUM SERPL-SCNC: 137 MMOL/L — SIGNIFICANT CHANGE UP (ref 135–145)
WBC # BLD: 10.7 K/UL — HIGH (ref 3.8–10.5)
WBC # FLD AUTO: 10.7 K/UL — HIGH (ref 3.8–10.5)

## 2017-07-30 PROCEDURE — 99232 SBSQ HOSP IP/OBS MODERATE 35: CPT | Mod: 25

## 2017-07-30 PROCEDURE — 93750 INTERROGATION VAD IN PERSON: CPT

## 2017-07-30 RX ORDER — WARFARIN SODIUM 2.5 MG/1
3 TABLET ORAL ONCE
Qty: 0 | Refills: 0 | Status: COMPLETED | OUTPATIENT
Start: 2017-07-30 | End: 2017-07-30

## 2017-07-30 RX ADMIN — Medication 20 MILLIGRAM(S): at 06:05

## 2017-07-30 RX ADMIN — Medication 1 APPLICATION(S): at 22:58

## 2017-07-30 RX ADMIN — Medication 1 APPLICATION(S): at 06:09

## 2017-07-30 RX ADMIN — CARVEDILOL PHOSPHATE 6.25 MILLIGRAM(S): 80 CAPSULE, EXTENDED RELEASE ORAL at 06:05

## 2017-07-30 RX ADMIN — Medication 500 MILLIGRAM(S): at 11:49

## 2017-07-30 RX ADMIN — PANTOPRAZOLE SODIUM 40 MILLIGRAM(S): 20 TABLET, DELAYED RELEASE ORAL at 06:04

## 2017-07-30 RX ADMIN — SODIUM CHLORIDE 3 MILLILITER(S): 9 INJECTION INTRAMUSCULAR; INTRAVENOUS; SUBCUTANEOUS at 14:05

## 2017-07-30 RX ADMIN — HEPARIN SODIUM 11 UNIT(S)/HR: 5000 INJECTION INTRAVENOUS; SUBCUTANEOUS at 09:23

## 2017-07-30 RX ADMIN — Medication 100 MILLIGRAM(S): at 22:58

## 2017-07-30 RX ADMIN — Medication 100 MILLIGRAM(S): at 06:04

## 2017-07-30 RX ADMIN — MEXILETINE HYDROCHLORIDE 150 MILLIGRAM(S): 150 CAPSULE ORAL at 15:40

## 2017-07-30 RX ADMIN — MEXILETINE HYDROCHLORIDE 150 MILLIGRAM(S): 150 CAPSULE ORAL at 06:05

## 2017-07-30 RX ADMIN — CARVEDILOL PHOSPHATE 6.25 MILLIGRAM(S): 80 CAPSULE, EXTENDED RELEASE ORAL at 17:12

## 2017-07-30 RX ADMIN — AMIODARONE HYDROCHLORIDE 200 MILLIGRAM(S): 400 TABLET ORAL at 06:04

## 2017-07-30 RX ADMIN — WARFARIN SODIUM 3 MILLIGRAM(S): 2.5 TABLET ORAL at 22:58

## 2017-07-30 RX ADMIN — SODIUM CHLORIDE 3 MILLILITER(S): 9 INJECTION INTRAMUSCULAR; INTRAVENOUS; SUBCUTANEOUS at 06:09

## 2017-07-30 RX ADMIN — SODIUM CHLORIDE 3 MILLILITER(S): 9 INJECTION INTRAMUSCULAR; INTRAVENOUS; SUBCUTANEOUS at 22:58

## 2017-07-30 RX ADMIN — QUETIAPINE FUMARATE 50 MILLIGRAM(S): 200 TABLET, FILM COATED ORAL at 22:58

## 2017-07-30 RX ADMIN — Medication 0.6 MILLIGRAM(S): at 11:49

## 2017-07-30 RX ADMIN — Medication 100 MILLIGRAM(S): at 13:58

## 2017-07-30 RX ADMIN — MEXILETINE HYDROCHLORIDE 150 MILLIGRAM(S): 150 CAPSULE ORAL at 23:26

## 2017-07-30 RX ADMIN — PANTOPRAZOLE SODIUM 40 MILLIGRAM(S): 20 TABLET, DELAYED RELEASE ORAL at 17:12

## 2017-07-30 RX ADMIN — SPIRONOLACTONE 25 MILLIGRAM(S): 25 TABLET, FILM COATED ORAL at 06:05

## 2017-07-30 RX ADMIN — Medication 1 MILLIGRAM(S): at 11:49

## 2017-07-30 RX ADMIN — Medication 1 APPLICATION(S): at 13:58

## 2017-07-30 NOTE — PROGRESS NOTE ADULT - ASSESSMENT
This is 67 year old man with PMHx of CHF, NICM, s/p 6/12/17 HM2 LVAD post op prolonged 2/2 GIB s/p EGD finding: non-bleeding small superficial gastric ulcer, Small bowel capsule showing 2 non-bleeding angiodysplastic lesions in the jejunum s/p argon plasma coagulation (APC); Clips were placed. Patient discharged to [facility] rehab on 7/17. Now presents to [facility] on 7/24 from rehab facility with c/o melena x 3 days. Found to have positive stool guaiac, and anemic with an H&H 6.9 and 20.8 Transfused on 7/23 with 2 unit PRBC's. Denies abdominal pain, N/V/D, or bloody stools at this time.  Readmitted for further work up and medical management.    7/25 underwent EGD this am capsule administered this afternoon npo- no stool today 9.5/29.3  7/25 Enteroscopy Finding: The examined esophagus was normal. The entire examined stomach was normal. There was no evidence of significant pathology in the entire examined duodenum. Two angioectasias with no bleeding were found in the proximal jejunum. Coagulation for  hemostasis using bipolar probe was successful. Impression: Normal esophagus, stomach and duodenum. Two non-bleeding angioectasias in the jejunum. Treated with bipolar cautery.  No specimens collected.  7/26 Melena stool x 1; CBC series. Heparin gtt resumed as per CHF team. Plan to bridge with coumadin if no further bleeding; Awaiting Capsule study results.    7/27 Small bowel capsule showing 2 small angioectasias in distal small bowel without active bleeding or stigmata of recent bleeding. No signs of overt bleeding currently. H/H stable. Coumadin resumed today by HF.  7/28 Multiple episodes of WCT, eps re consulted, mexilitine increased to TID  7/29 Coumadin 3 ambulating  7/30 Coumadin 3 ambulating H&H stable 9.5/30.6 eager for discharge

## 2017-07-30 NOTE — PROGRESS NOTE ADULT - SUBJECTIVE AND OBJECTIVE BOX
Interval History:  doing well. no further melena. Waiting on INR to be therapeutic.     Medications:  sodium chloride 0.9% lock flush 3 milliLiter(s) IV Push every 8 hours  spironolactone 25 milliGRAM(s) Oral daily  amiodarone    Tablet 200 milliGRAM(s) Oral daily  acetaminophen   Tablet 650 milliGRAM(s) Oral every 6 hours PRN  diphenhydrAMINE   Capsule 25 milliGRAM(s) Oral every 4 hours PRN  colchicine 0.6 milliGRAM(s) Oral daily  QUEtiapine 50 milliGRAM(s) Oral at bedtime  carvedilol 6.25 milliGRAM(s) Oral every 12 hours  hydrocortisone 1% Cream 1 Application(s) Topical three times a day  furosemide    Tablet 20 milliGRAM(s) Oral daily  docusate sodium 100 milliGRAM(s) Oral three times a day  folic acid 1 milliGRAM(s) Oral daily  ascorbic acid 500 milliGRAM(s) Oral daily  pantoprazole  Injectable 40 milliGRAM(s) IV Push two times a day  mexiletine 150 milliGRAM(s) Oral every 8 hours  heparin  Infusion 1100 Unit(s)/Hr IV Continuous <Continuous>  warfarin 3 milliGRAM(s) Oral once    Vitals:  T(C): 36.8 (17 @ 18:04), Max: 36.9 (17 @ 21:56)  HR: 88 (17 @ 18:04) (79 - 91)  BP: 116/87 (17 @ 18:04) (95/59 - 118/66)  BP(mean): 97 (17 @ 18:04) (71 - 97)  ABP: --  ABP(mean): --  RR: 18 (17 @ 18:04) (18 - 19)    Daily     Daily Weight in k.3 (2017 12:15)    I&O's Summary    2017 07:01  -  2017 07:00  --------------------------------------------------------  IN: 1604 mL / OUT: 3025 mL / NET: -1421 mL    2017 07:01  -  2017 19:20  --------------------------------------------------------  IN: 840 mL / OUT: 970 mL / NET: -130 mL    Physical Exam:  Appearance: No Acute Distress  HEENT: No JVD  Cardiovascular: LVAD hum  Respiratory: Clear to auscultation bilaterally  Gastrointestinal: Soft, Non-tender	  Skin: No cyanosis	  Neurologic: Non-focal  Extremities: No LE edema  Psychiatry: A & O x 3, Mood & affect appropriate    LVAD Interrogation:  No events  Programming Changes: No changes made    Labs:                        9.5    10.7  )-----------( 293      ( 2017 05:25 )             30.6         137  |  102  |  16  ----------------------------<  96  4.4   |  23  |  1.10    Ca    9.0      2017 05:24      PT/INR - ( 2017 05:25 )   PT: 16.7 sec;   INR: 1.53 ratio         PTT - ( 2017 05:25 )  PTT:59.0 sec          Lactate Dehydrogenase, Serum: 205 U/L ( @ 05:24)  Lactate Dehydrogenase, Serum: 257 U/L ( @ 05:29)  Lactate Dehydrogenase, Serum: 266 U/L ( @ 08:38)          TELEMETRY:    [ ] Echocardiogram:

## 2017-07-30 NOTE — PROGRESS NOTE ADULT - SUBJECTIVE AND OBJECTIVE BOX
Subjective  Hello Axox3 no acute distress    VITAL SIGNS    Telemetry:  NSR     Vital Signs Last 24 Hrs  T(C): 36.6 (17 @ 10:24), Max: 36.9 (17 @ 21:56)  T(F): 97.9 (17 @ 10:24), Max: 98.4 (17 @ 21:56)  HR: 89 (17 @ 10:24) (79 - 98)  BP: 118/66 (17 @ 10:24) (95/59 - 118/66)  RR: 18 (17 @ 10:24) (18 - 19)  SpO2: 99% (17 @ 10:24) (98% - 100%)            @ 07:  -   @ 07:00  --------------------------------------------------------  IN: 1604 mL / OUT: 3025 mL / NET: -1421 mL     @ 07:  -   @ 11:37  --------------------------------------------------------  IN: 240 mL / OUT: 650 mL / NET: -410 mL  Daily Weight in k.2 (2017 18:11)    Coumadin    [x ] YES   Reason:   LVAD      PHYSICAL EXAM  Neurology: alert and oriented x 3, nonfocal, no gross deficits  CV : LVAD sounds   Drive line stable   Sternal Wound :  CDI , Stable  Lungs:CTA  Abdomen: soft, nontender, nondistended, positive bowel sounds, last bowel movement   :  Voiding           Extremities:  B/L  +DP warm            Physical Therapy Rec:   Home  PT    Discussed with Cardiothoracic Team at AM rounds.

## 2017-07-30 NOTE — PROGRESS NOTE ADULT - ASSESSMENT
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 LVAD working well without PI/power events. Repeat scope with angioectasias but no source found. Recurrent sustained VT, asymptomatic  - GI recs appreciated  - trend CBC; CBC stable so far on Heparin gtt; coumadin 3 mg tonight  - PPI Daily  - for MAPs, follow cuff pressures  - mexilitine 150 mg q8h    [id number] for covering on call fellow after 5 pm

## 2017-07-31 VITALS
RESPIRATION RATE: 18 BRPM | SYSTOLIC BLOOD PRESSURE: 100 MMHG | OXYGEN SATURATION: 97 % | HEART RATE: 100 BPM | DIASTOLIC BLOOD PRESSURE: 78 MMHG | TEMPERATURE: 98 F

## 2017-07-31 DIAGNOSIS — I50.22 CHRONIC SYSTOLIC (CONGESTIVE) HEART FAILURE: ICD-10-CM

## 2017-07-31 DIAGNOSIS — Z51.81 ENCOUNTER FOR THERAPEUTIC DRUG LEVEL MONITORING: ICD-10-CM

## 2017-07-31 DIAGNOSIS — I47.2 VENTRICULAR TACHYCARDIA: ICD-10-CM

## 2017-07-31 LAB
ANION GAP SERPL CALC-SCNC: 11 MMOL/L — SIGNIFICANT CHANGE UP (ref 5–17)
APTT BLD: 89.8 SEC — HIGH (ref 27.5–37.4)
BUN SERPL-MCNC: 16 MG/DL — SIGNIFICANT CHANGE UP (ref 7–23)
CALCIUM SERPL-MCNC: 9.1 MG/DL — SIGNIFICANT CHANGE UP (ref 8.4–10.5)
CHLORIDE SERPL-SCNC: 103 MMOL/L — SIGNIFICANT CHANGE UP (ref 96–108)
CO2 SERPL-SCNC: 24 MMOL/L — SIGNIFICANT CHANGE UP (ref 22–31)
CREAT SERPL-MCNC: 1.12 MG/DL — SIGNIFICANT CHANGE UP (ref 0.5–1.3)
GLUCOSE SERPL-MCNC: 100 MG/DL — HIGH (ref 70–99)
HCT VFR BLD CALC: 31.4 % — LOW (ref 39–50)
HGB BLD-MCNC: 9.8 G/DL — LOW (ref 13–17)
INR BLD: 1.6 RATIO — HIGH (ref 0.88–1.16)
LDH SERPL L TO P-CCNC: 216 U/L — SIGNIFICANT CHANGE UP (ref 50–242)
MCHC RBC-ENTMCNC: 28.8 PG — SIGNIFICANT CHANGE UP (ref 27–34)
MCHC RBC-ENTMCNC: 31.1 GM/DL — LOW (ref 32–36)
MCV RBC AUTO: 92.6 FL — SIGNIFICANT CHANGE UP (ref 80–100)
PLATELET # BLD AUTO: 304 K/UL — SIGNIFICANT CHANGE UP (ref 150–400)
POTASSIUM SERPL-MCNC: 4.6 MMOL/L — SIGNIFICANT CHANGE UP (ref 3.5–5.3)
POTASSIUM SERPL-SCNC: 4.6 MMOL/L — SIGNIFICANT CHANGE UP (ref 3.5–5.3)
PROTHROM AB SERPL-ACNC: 17.6 SEC — HIGH (ref 9.8–12.7)
RBC # BLD: 3.39 M/UL — LOW (ref 4.2–5.8)
RBC # FLD: 16.6 % — HIGH (ref 10.3–14.5)
SODIUM SERPL-SCNC: 138 MMOL/L — SIGNIFICANT CHANGE UP (ref 135–145)
WBC # BLD: 11.1 K/UL — HIGH (ref 3.8–10.5)
WBC # FLD AUTO: 11.1 K/UL — HIGH (ref 3.8–10.5)

## 2017-07-31 PROCEDURE — 97166 OT EVAL MOD COMPLEX 45 MIN: CPT

## 2017-07-31 PROCEDURE — 80053 COMPREHEN METABOLIC PANEL: CPT

## 2017-07-31 PROCEDURE — 85730 THROMBOPLASTIN TIME PARTIAL: CPT

## 2017-07-31 PROCEDURE — 80048 BASIC METABOLIC PNL TOTAL CA: CPT

## 2017-07-31 PROCEDURE — P9016: CPT

## 2017-07-31 PROCEDURE — 97116 GAIT TRAINING THERAPY: CPT

## 2017-07-31 PROCEDURE — 99233 SBSQ HOSP IP/OBS HIGH 50: CPT

## 2017-07-31 PROCEDURE — 86900 BLOOD TYPING SEROLOGIC ABO: CPT

## 2017-07-31 PROCEDURE — 99239 HOSP IP/OBS DSCHRG MGMT >30: CPT

## 2017-07-31 PROCEDURE — 86923 COMPATIBILITY TEST ELECTRIC: CPT

## 2017-07-31 PROCEDURE — 36430 TRANSFUSION BLD/BLD COMPNT: CPT

## 2017-07-31 PROCEDURE — 85610 PROTHROMBIN TIME: CPT

## 2017-07-31 PROCEDURE — 86901 BLOOD TYPING SEROLOGIC RH(D): CPT

## 2017-07-31 PROCEDURE — 99233 SBSQ HOSP IP/OBS HIGH 50: CPT | Mod: 25

## 2017-07-31 PROCEDURE — 93750 INTERROGATION VAD IN PERSON: CPT

## 2017-07-31 PROCEDURE — 85027 COMPLETE CBC AUTOMATED: CPT

## 2017-07-31 PROCEDURE — 97162 PT EVAL MOD COMPLEX 30 MIN: CPT

## 2017-07-31 PROCEDURE — 86850 RBC ANTIBODY SCREEN: CPT

## 2017-07-31 PROCEDURE — 83615 LACTATE (LD) (LDH) ENZYME: CPT

## 2017-07-31 RX ORDER — PANTOPRAZOLE SODIUM 20 MG/1
1 TABLET, DELAYED RELEASE ORAL
Qty: 60 | Refills: 0 | OUTPATIENT
Start: 2017-07-31 | End: 2017-08-30

## 2017-07-31 RX ORDER — HYDROCORTISONE 1 %
1 OINTMENT (GRAM) TOPICAL
Qty: 1 | Refills: 0 | OUTPATIENT
Start: 2017-07-31 | End: 2017-08-30

## 2017-07-31 RX ORDER — ASCORBIC ACID 60 MG
1 TABLET,CHEWABLE ORAL
Qty: 30 | Refills: 0 | OUTPATIENT
Start: 2017-07-31 | End: 2017-08-30

## 2017-07-31 RX ORDER — WARFARIN SODIUM 2.5 MG/1
1 TABLET ORAL
Qty: 30 | Refills: 0 | OUTPATIENT
Start: 2017-07-31 | End: 2017-08-30

## 2017-07-31 RX ORDER — FOLIC ACID 0.8 MG
1 TABLET ORAL
Qty: 30 | Refills: 0 | OUTPATIENT
Start: 2017-07-31 | End: 2017-08-30

## 2017-07-31 RX ORDER — LISINOPRIL 2.5 MG/1
2.5 TABLET ORAL DAILY
Qty: 0 | Refills: 0 | Status: DISCONTINUED | OUTPATIENT
Start: 2017-07-31 | End: 2017-07-31

## 2017-07-31 RX ORDER — DIPHENHYDRAMINE HCL 50 MG
1 CAPSULE ORAL
Qty: 30 | Refills: 0 | OUTPATIENT
Start: 2017-07-31 | End: 2017-08-05

## 2017-07-31 RX ORDER — QUETIAPINE FUMARATE 200 MG/1
1 TABLET, FILM COATED ORAL
Qty: 30 | Refills: 0 | OUTPATIENT
Start: 2017-07-31 | End: 2017-08-30

## 2017-07-31 RX ORDER — ASCORBIC ACID 60 MG
1 TABLET,CHEWABLE ORAL
Qty: 0 | Refills: 0 | COMMUNITY

## 2017-07-31 RX ORDER — SPIRONOLACTONE 25 MG/1
1 TABLET, FILM COATED ORAL
Qty: 30 | Refills: 0 | OUTPATIENT
Start: 2017-07-31 | End: 2017-08-30

## 2017-07-31 RX ORDER — LISINOPRIL 2.5 MG/1
1 TABLET ORAL
Qty: 30 | Refills: 0 | OUTPATIENT
Start: 2017-07-31 | End: 2017-08-30

## 2017-07-31 RX ORDER — AMIODARONE HYDROCHLORIDE 400 MG/1
1 TABLET ORAL
Qty: 30 | Refills: 0 | OUTPATIENT
Start: 2017-07-31 | End: 2017-08-30

## 2017-07-31 RX ORDER — CARVEDILOL PHOSPHATE 80 MG/1
1 CAPSULE, EXTENDED RELEASE ORAL
Qty: 60 | Refills: 0 | OUTPATIENT
Start: 2017-07-31 | End: 2017-08-30

## 2017-07-31 RX ORDER — DOCUSATE SODIUM 100 MG
1 CAPSULE ORAL
Qty: 21 | Refills: 0 | OUTPATIENT
Start: 2017-07-31 | End: 2017-08-07

## 2017-07-31 RX ORDER — COLCHICINE 0.6 MG
1 TABLET ORAL
Qty: 30 | Refills: 0 | OUTPATIENT
Start: 2017-07-31 | End: 2017-08-30

## 2017-07-31 RX ORDER — PANTOPRAZOLE SODIUM 20 MG/1
1 TABLET, DELAYED RELEASE ORAL
Qty: 30 | Refills: 0 | OUTPATIENT
Start: 2017-07-31 | End: 2017-08-30

## 2017-07-31 RX ORDER — FUROSEMIDE 40 MG
1 TABLET ORAL
Qty: 30 | Refills: 0 | OUTPATIENT
Start: 2017-07-31 | End: 2017-08-30

## 2017-07-31 RX ORDER — MEXILETINE HYDROCHLORIDE 150 MG/1
1 CAPSULE ORAL
Qty: 90 | Refills: 0 | OUTPATIENT
Start: 2017-07-31 | End: 2017-08-30

## 2017-07-31 RX ADMIN — LISINOPRIL 2.5 MILLIGRAM(S): 2.5 TABLET ORAL at 13:39

## 2017-07-31 RX ADMIN — MEXILETINE HYDROCHLORIDE 150 MILLIGRAM(S): 150 CAPSULE ORAL at 13:06

## 2017-07-31 RX ADMIN — MEXILETINE HYDROCHLORIDE 150 MILLIGRAM(S): 150 CAPSULE ORAL at 06:13

## 2017-07-31 RX ADMIN — Medication 500 MILLIGRAM(S): at 13:06

## 2017-07-31 RX ADMIN — Medication 100 MILLIGRAM(S): at 13:06

## 2017-07-31 RX ADMIN — Medication 0.6 MILLIGRAM(S): at 13:06

## 2017-07-31 RX ADMIN — Medication 20 MILLIGRAM(S): at 06:06

## 2017-07-31 RX ADMIN — SPIRONOLACTONE 25 MILLIGRAM(S): 25 TABLET, FILM COATED ORAL at 06:06

## 2017-07-31 RX ADMIN — Medication 1 APPLICATION(S): at 13:07

## 2017-07-31 RX ADMIN — Medication 1 APPLICATION(S): at 06:12

## 2017-07-31 RX ADMIN — SODIUM CHLORIDE 3 MILLILITER(S): 9 INJECTION INTRAMUSCULAR; INTRAVENOUS; SUBCUTANEOUS at 06:12

## 2017-07-31 RX ADMIN — Medication 1 MILLIGRAM(S): at 13:06

## 2017-07-31 RX ADMIN — SODIUM CHLORIDE 3 MILLILITER(S): 9 INJECTION INTRAMUSCULAR; INTRAVENOUS; SUBCUTANEOUS at 13:09

## 2017-07-31 RX ADMIN — PANTOPRAZOLE SODIUM 40 MILLIGRAM(S): 20 TABLET, DELAYED RELEASE ORAL at 06:07

## 2017-07-31 RX ADMIN — AMIODARONE HYDROCHLORIDE 200 MILLIGRAM(S): 400 TABLET ORAL at 06:06

## 2017-07-31 RX ADMIN — HEPARIN SODIUM 10.5 UNIT(S)/HR: 5000 INJECTION INTRAVENOUS; SUBCUTANEOUS at 12:02

## 2017-07-31 RX ADMIN — Medication 100 MILLIGRAM(S): at 06:06

## 2017-07-31 RX ADMIN — CARVEDILOL PHOSPHATE 6.25 MILLIGRAM(S): 80 CAPSULE, EXTENDED RELEASE ORAL at 06:06

## 2017-07-31 NOTE — PROVIDER CONTACT NOTE (OTHER) - BACKGROUND
6/12- LVAD
6/12/17-LVAD implanted  7/24/17- readmission for GI bleed
patient have HX of 14. Patient has LVAD
6/12- LVAD
6/12/17-LVAD implanted  7/24/17- GI bleed readmission

## 2017-07-31 NOTE — PROGRESS NOTE ADULT - PROBLEM SELECTOR PLAN 3
Increase mexilitine, 150 mg  TID -EPS called
Increase mexilitine, 150 mg  TID -EPS called
Increase mexilitine, EPS
No evidence of recurrence  Will monitor closely as an outpatient  Will continue to hold aspirin

## 2017-07-31 NOTE — PROGRESS NOTE ADULT - PROVIDER SPECIALTY LIST ADULT
CT Surgery
Gastroenterology
Gastroenterology
Heart Failure
Psychiatry
Psychiatry
Electrophysiology
Heart Failure

## 2017-07-31 NOTE — PROGRESS NOTE ADULT - ATTENDING COMMENTS
Marga Martines, PhD  [phone]
Continue to monitor  Agree with octreotide which can be converted to long acting
Marga Martines, PhD  [phone]
Stable H/H on current therapy and no melenic stools  - Continue monitoring CBC while on heparin gtt; adjust for PTT 50-70; start coumadin 3 mg tonight  - goal INR 1.8-2.2  - ambulate, PT/OT  - ideally would start octreotide; case management looking into whether pt can get as outpt
Reviewed events from today. No further melenic stools. H/H bumped appropriately with 2 U PRBC. Endoscopy w/o overt bleed but showed nonbleeding AVMs s/p cautery. Video capsule study underway. Continue monitoring CBC. Will likely restart anticoagulation with heparin gtt tomorrow and monitor for GIB with subsequent reinitiation of coumadin (goal 2-2.5). LVAD interrogated w/o events.
Reviewed events from today. May have had melenic stool today after heparin gtt started. H/H stable. Awaiting results of capsule study. On exam, appears euvolemic and BPs have been in  (per report able to obtain cuff pressures). LVAD interrogated without events.   - Continue monitoring CBC while on heparin gtt; adjust for PTT 50-70  - if rebleeds, will likely need lower INR goal and would initiate octreotide if able to get approval  - ambulate, PT/OT
Clinically stable without recurrent bleeding. Will discharge today on current dose of warfarin. Will continue to hold aspirin. Will repeat INR on Thursday. Follow-up in LVAD clinic next week.

## 2017-07-31 NOTE — PROGRESS NOTE ADULT - PROBLEM SELECTOR PLAN 1
start lisinopril Continue current dose of lasix  Will add lisinopril 2.5 mg daily given MAPs slightly above goal of 70-80 mmHg  Continue current dose of Coreg

## 2017-07-31 NOTE — PROGRESS NOTE ADULT - PROBLEM SELECTOR PROBLEM 3
Arrhythmia, ventricular
Gastrointestinal hemorrhage with melena

## 2017-07-31 NOTE — PROGRESS NOTE ADULT - PROBLEM SELECTOR PLAN 2
H& H stable   continue low dose coumadin for d/c Continue current speed. No evidence of LVAD malfunction.

## 2017-07-31 NOTE — CHART NOTE - NSCHARTNOTEFT_GEN_A_CORE
Source: Patient [X ]    Family [ ]     other [ X] RN, medical record     Pt seen, reports feeling well and states he feels ready to go home. Pt reports a good PO intake, states he "eats everything". Pt is amenable to review HF and coumadin/vitamin K drug interaction diet education. Pt is using teach back points, displays good understanding of information.     Per chart, Pt s/p NICM, s/p LVAD. Readmitted with melena. S/p capsule study with 2 small angioectasias, non bleeding.     Diet : Low Na       Patient reports no GI distress. Last BM 7/30, Pt reports it to be normal.      PO intake:  % of meals.      Source for PO intake [ X] Patient [ X] chart      Enteral /Parenteral Nutrition: None       Current Weight: Weight (kg):Admission Wt: 68.8kg, 7/31L 66.7kg, decline likely related to fluid loss. No edema at this time.     Pertinent Medications: MEDICATIONS  (STANDING):  sodium chloride 0.9% lock flush 3 milliLiter(s) IV Push every 8 hours  spironolactone 25 milliGRAM(s) Oral daily  amiodarone    Tablet 200 milliGRAM(s) Oral daily  colchicine 0.6 milliGRAM(s) Oral daily  QUEtiapine 50 milliGRAM(s) Oral at bedtime  carvedilol 6.25 milliGRAM(s) Oral every 12 hours  hydrocortisone 1% Cream 1 Application(s) Topical three times a day  furosemide    Tablet 20 milliGRAM(s) Oral daily  docusate sodium 100 milliGRAM(s) Oral three times a day  folic acid 1 milliGRAM(s) Oral daily  ascorbic acid 500 milliGRAM(s) Oral daily  pantoprazole  Injectable 40 milliGRAM(s) IV Push two times a day  mexiletine 150 milliGRAM(s) Oral every 8 hours  heparin  Infusion 1100 Unit(s)/Hr (10.5 mL/Hr) IV Continuous <Continuous>    MEDICATIONS  (PRN):  acetaminophen   Tablet 650 milliGRAM(s) Oral every 6 hours PRN Mild Pain (1 - 3)  diphenhydrAMINE   Capsule 25 milliGRAM(s) Oral every 4 hours PRN Rash and/or Itching    Pertinent Labs:    Hgb/Hct:9.8/31.4-low, Na:138, K:4.6, Cl:103, BUN:16, Cr:1.12, Glucose:100-high, Ca:9.1       Skin: intact     Estimated Needs:   [X ] no change since previous assessment  [ ] recalculated:       Previous Nutrition Diagnosis:      [X ] Food & Nutrition Related Knowledge Deficit  [ X] Mild Malnutrition       Nutrition Diagnosis is [X ] ongoing; and improving      New Nutrition Diagnosis: [ X] not applicable    Interventions:   1. Provide food preferences as requested by Pt/family within diet restrictions   2. Encourage PO intake during meal times  3. Continue to provide and reinforce HF and coumadin/vitamin K drug interaction nutrition therapy       Monitoring and Evaluation:     [ X] PO intake [ X] Tolerance to diet prescription [X ] weights [X ] follow up per protocol    [X ] other: RD remains available Sarah Siegler RD. Pager #[phone]

## 2017-07-31 NOTE — PROGRESS NOTE ADULT - SUBJECTIVE AND OBJECTIVE BOX
I feel good I want to go home       sodium chloride 0.9% lock flush 3 milliLiter(s) IV Push every 8 hours  spironolactone 25 milliGRAM(s) Oral daily  amiodarone    Tablet 200 milliGRAM(s) Oral daily  acetaminophen   Tablet 650 milliGRAM(s) Oral every 6 hours PRN  diphenhydrAMINE   Capsule 25 milliGRAM(s) Oral every 4 hours PRN  colchicine 0.6 milliGRAM(s) Oral daily  QUEtiapine 50 milliGRAM(s) Oral at bedtime  carvedilol 6.25 milliGRAM(s) Oral every 12 hours  hydrocortisone 1% Cream 1 Application(s) Topical three times a day  furosemide    Tablet 20 milliGRAM(s) Oral daily  docusate sodium 100 milliGRAM(s) Oral three times a day  folic acid 1 milliGRAM(s) Oral daily  ascorbic acid 500 milliGRAM(s) Oral daily  pantoprazole  Injectable 40 milliGRAM(s) IV Push two times a day  mexiletine 150 milliGRAM(s) Oral every 8 hours        REVIEW OF SYSTEMS:  Vital Signs Last 24 Hrs  T(C): 36.3 (2017 10:00), Max: 37.1 (2017 22:18)  T(F): 97.3 (2017 10:00), Max: 98.7 (2017 22:18)  HR: 93 (2017 10:45) (81 - 98)  BP: 110/73 (2017 10:45) (97/73 - 116/87)  BP(mean): 85 (2017 10:45) (80 - 97)  RR: 18 (2017 10:00) (16 - 18)  SpO2: 100% (2017 10:45) (98% - 100%)    PHYSICAL EXAM:  General: A/ox 3, No acute Distress  Neck: Supple, NO JVD  Cardiac: S1 S2, No M/R/G  Pulmonary: CTAB, Breathing unlabored, No Rhonchi/Rales/Wheezing  Abdomen: Soft, Non -tender, +BS   Extremities: No Rashes, No edema  Neuro: A/o x 3, No focal deficits  Psch: normal mood , normal affect    LABS:  cret                        9.8    11.1  )-----------( 304      ( 2017 06:30 )             31.4     07-31    138  |  103  |  16  ----------------------------<  100<H>  4.6   |  24  |  1.12    Ca    9.1      2017 06:30  l       LVAD Interrogation:  Pump Flow:5.2  Pump Speed:9200  Pulse Index:4.4  Pump Power:6.9  VAD Events: none   Driveline evaluation:    Programming Changes: [ ] No changes made [ ] Changes made:    PT/INR - ( 2017 06:30 )   PT: 17.6 sec;   INR: 1.60 ratio         PTT - ( 2017 06:30 )  PTT:89.8 sec  Daily     Daily Weight in k.7 (2017 08:18)  I&O's Detail    2017 07:  -  2017 07:00  --------------------------------------------------------  IN:    heparin Infusion: 120 mL    Oral Fluid: 720 mL  Total IN: 840 mL    OUT:    Voided: 2220 mL  Total OUT: 2220 mL    Total NET: -1380 mL      2017 07:  -  2017 13:23  --------------------------------------------------------  IN:    Oral Fluid: 350 mL  Total IN: 350 mL    OUT:    Voided: 850 mL  Total OUT: 850 mL    Total NET: -500 mL Subjective:  "I feel good I want to go home". No complaints of dizziness or shortness of breath. Ambulating without difficulty.       sodium chloride 0.9% lock flush 3 milliLiter(s) IV Push every 8 hours  spironolactone 25 milliGRAM(s) Oral daily  amiodarone    Tablet 200 milliGRAM(s) Oral daily  acetaminophen   Tablet 650 milliGRAM(s) Oral every 6 hours PRN  diphenhydrAMINE   Capsule 25 milliGRAM(s) Oral every 4 hours PRN  colchicine 0.6 milliGRAM(s) Oral daily  QUEtiapine 50 milliGRAM(s) Oral at bedtime  carvedilol 6.25 milliGRAM(s) Oral every 12 hours  hydrocortisone 1% Cream 1 Application(s) Topical three times a day  furosemide    Tablet 20 milliGRAM(s) Oral daily  docusate sodium 100 milliGRAM(s) Oral three times a day  folic acid 1 milliGRAM(s) Oral daily  ascorbic acid 500 milliGRAM(s) Oral daily  pantoprazole  Injectable 40 milliGRAM(s) IV Push two times a day  mexiletine 150 milliGRAM(s) Oral every 8 hours    Vital Signs Last 24 Hrs  T(C): 36.3 (31 Jul 2017 10:00), Max: 37.1 (30 Jul 2017 22:18)  T(F): 97.3 (31 Jul 2017 10:00), Max: 98.7 (30 Jul 2017 22:18)  HR: 93 (31 Jul 2017 10:45) (81 - 98)  BP: 110/73 (31 Jul 2017 10:45) (97/73 - 116/87)  BP(mean): 85 (31 Jul 2017 10:45) (80 - 97)  RR: 18 (31 Jul 2017 10:00) (16 - 18)  SpO2: 100% (31 Jul 2017 10:45) (98% - 100%)    PHYSICAL EXAM:  General: A/ox 3, No acute Distress  Neck: Supple, NO JVD  Cardiac: S1 S2, No M/R/G. Typical LVAD sounds.   Pulmonary: CTAB, Breathing unlabored, No Rhonchi/Rales/Wheezing  Abdomen: Soft, Non -tender, +BS   Extremities: No Rashes, No edema  Neuro: A/o x 3, No focal deficits  Psch: normal mood , normal affect    LVAD Interrogation:  Pump Flow:5.2  Pump Speed:9200  Pulse Index:4.4  Pump Power:6.9  VAD Events: None   Driveline evaluation: Clean, dry and intact.   Programming Changes: No changes made       LABS:                        9.8    11.1  )-----------( 304      ( 31 Jul 2017 06:30 )             31.4     07-31    138  |  103  |  16  ----------------------------<  100<H>  4.6   |  24  |  1.12    Ca    9.1      31 Jul 2017 06:30  l       PT/INR - ( 31 Jul 2017 06:30 )   PT: 17.6 sec;   INR: 1.60 ratio      PTT - ( 31 Jul 2017 06:30 )  PTT:89.8 sec

## 2017-07-31 NOTE — PROGRESS NOTE ADULT - ASSESSMENT
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 LVAD working well without PI/power events. Repeat scope with angioectasias but no source found. Recurrent sustained VT, asymptomatic, Presenting today feeling better no evidence of GI bleed , MAP slightly elevated 67 year old man with chronic systolic heart failure, ACC/AHA stage D, due to nonischemic cardiomyopathy s/p LVAD implantation on 6/12 of this year with post-operative course complicated by recurrent GI hemorrhage likely due to small bowel angioectasias, now stable, but with subtherapeutic INR. He has also had recurrent sustained VT without symptoms. He is ready for discharge.

## 2017-07-31 NOTE — PROGRESS NOTE ADULT - NSHPATTENDINGPLANDISCUSS_GEN_ALL_CORE
patient and care team
care team and patient
care team and patient
CTS Team this am
CTS team this am
CTS TEAM
2 Gonzalo team

## 2017-07-31 NOTE — PROGRESS NOTE ADULT - SUBJECTIVE AND OBJECTIVE BOX
FELLOW PROGRESS NOTE Date of Admission:    24H hour events: No events overnight.  Asymptomatic, in good spirits denying any CP, SOB, palpitations or lightheadedness     Tele reviewed sinus HR , triplets, PVCs    MEDICATIONS:  spironolactone 25 milliGRAM(s) Oral daily  amiodarone    Tablet 200 milliGRAM(s) Oral daily  carvedilol 6.25 milliGRAM(s) Oral every 12 hours  furosemide    Tablet 20 milliGRAM(s) Oral daily  mexiletine 150 milliGRAM(s) Oral every 8 hours  heparin  Infusion 1100 Unit(s)/Hr IV Continuous <Continuous>        acetaminophen   Tablet 650 milliGRAM(s) Oral every 6 hours PRN  diphenhydrAMINE   Capsule 25 milliGRAM(s) Oral every 4 hours PRN  QUEtiapine 50 milliGRAM(s) Oral at bedtime    docusate sodium 100 milliGRAM(s) Oral three times a day  pantoprazole  Injectable 40 milliGRAM(s) IV Push two times a day    colchicine 0.6 milliGRAM(s) Oral daily    hydrocortisone 1% Cream 1 Application(s) Topical three times a day  folic acid 1 milliGRAM(s) Oral daily  ascorbic acid 500 milliGRAM(s) Oral daily      REVIEW OF SYSTEMS:  Complete 10point ROS negative.    PHYSICAL EXAM:  T(C): 36.3 (07-31-17 @ 10:00), Max: 37.1 (07-30-17 @ 22:18)  HR: 93 (07-31-17 @ 10:45) (81 - 98)  BP: 110/73 (07-31-17 @ 10:45) (97/73 - 116/87)  RR: 18 (07-31-17 @ 10:00) (16 - 18)  SpO2: 100% (07-31-17 @ 10:45) (98% - 100%)  Wt(kg): --  I&O's Summary    30 Jul 2017 07:01  -  31 Jul 2017 07:00  --------------------------------------------------------  IN: 840 mL / OUT: 2220 mL / NET: -1380 mL    31 Jul 2017 07:01  -  31 Jul 2017 13:14  --------------------------------------------------------  IN: 350 mL / OUT: 850 mL / NET: -500 mL      Appearance: Normal, pleasant, sitting up comfortably in bed  HEENT:   Normal oral mucosa, PERRL, EOMI	  Lymphatic: No lymphadenopathy  Cardiovascular: Normal S1 S2, No JVD, No murmurs, No edema  Respiratory: Lungs clear to auscultation	  Psychiatry: A & O x 3, Mood & affect appropriate  Gastrointestinal:  Soft, Non-tender, + BS	  Skin: No rashes, No ecchymoses, No cyanosis	  Neurologic: Non-focal  Extremities: Normal range of motion, No clubbing, cyanosis or edema  Vascular: Peripheral pulses palpable 2+ bilaterally        LABS:	 	    CBC Full  -  ( 31 Jul 2017 06:30 )  WBC Count : 11.1 K/uL  Hemoglobin : 9.8 g/dL  Hematocrit : 31.4 %  Platelet Count - Automated : 304 K/uL  Mean Cell Volume : 92.6 fl  Mean Cell Hemoglobin : 28.8 pg  Mean Cell Hemoglobin Concentration : 31.1 gm/dL      07-31    138  |  103  |  16  ----------------------------<  100<H>  4.6   |  24  |  1.12  07-30    137  |  102  |  16  ----------------------------<  96  4.4   |  23  |  1.10    Ca    9.1      31 Jul 2017 06:30  Ca    9.0      30 Jul 2017 05:24      TELEMETRY: 	  Tele reviewed sinus HR , triplets, PVCs    ASSESSMENT/PLAN: 	    67M w/NICMP s/p LVAD (6/12/17 HMII, post-op course complicated by GIB, after which the patient was discharged to rehab on 7/17/2017 before presenting on 7/24/2017 with c/o melena, with A-Fib and WCT. Tele within last 24 hrs with NSR Hr , PVCs and triplets.     1) A-Fib with NSVT - stable, continue BB, Amiodarone, and Mexiletine at current doses, AC per primary team  - maintain K > 4, Mg > 2    - Will continue to follow     Bridgette Burnette MD   First yr Fellow   spectra [phone]

## 2017-07-31 NOTE — PROVIDER CONTACT NOTE (OTHER) - ASSESSMENT
Pt is asymptomatic. VS stable.
Patient stable, asymptomatic, v/s wdl.
Pt asymptomatic. MAP:94. HR:94.
Pt is asymptomatic. VS stable. Pt received Mexitil 150mg as ordered.
Pt is asymptomatic. No bleeding signs.

## 2017-07-31 NOTE — PROVIDER CONTACT NOTE (OTHER) - ACTION/TREATMENT ORDERED:
As per OPAL Shine, next PTT will be drawn tomorrow morning. Rate to maintained at 11cc/hr.
No further orders at this time. Continue to monitor the pt.
No further orders at this time. Continue to monitor the pt.
No further orders at this time. Continue to monitor the pt. Pt is to receive his dose of Mexiletine at 18:00
patient safety will continue to be monitor.

## 2017-07-31 NOTE — PROGRESS NOTE ADULT - PROBLEM SELECTOR PLAN 5
Will discharge with subtherapeutic INR and allow to gradually increase. Will repeat CBC and INR on Thursday.

## 2017-08-03 ENCOUNTER — APPOINTMENT (OUTPATIENT)
Dept: ELECTROPHYSIOLOGY | Facility: CLINIC | Age: 67
End: 2017-08-03
Payer: MEDICARE

## 2017-08-03 PROCEDURE — 93296 REM INTERROG EVL PM/IDS: CPT

## 2017-08-03 PROCEDURE — 93295 DEV INTERROG REMOTE 1/2/MLT: CPT

## 2017-08-10 ENCOUNTER — APPOINTMENT (OUTPATIENT)
Dept: CARDIOLOGY | Facility: CLINIC | Age: 67
End: 2017-08-10
Payer: MEDICARE

## 2017-08-10 ENCOUNTER — OTHER (OUTPATIENT)
Age: 67
End: 2017-08-10

## 2017-08-10 VITALS
OXYGEN SATURATION: 100 % | SYSTOLIC BLOOD PRESSURE: 90 MMHG | RESPIRATION RATE: 16 BRPM | HEIGHT: 68 IN | WEIGHT: 145.2 LBS | BODY MASS INDEX: 22.01 KG/M2 | HEART RATE: 90 BPM | TEMPERATURE: 97.4 F | DIASTOLIC BLOOD PRESSURE: 60 MMHG

## 2017-08-10 PROCEDURE — 99215 OFFICE O/P EST HI 40 MIN: CPT | Mod: 25

## 2017-08-10 PROCEDURE — 93750 INTERROGATION VAD IN PERSON: CPT

## 2017-08-10 RX ORDER — PANTOPRAZOLE 40 MG/1
40 TABLET, DELAYED RELEASE ORAL
Refills: 0 | Status: DISCONTINUED | COMMUNITY
End: 2017-08-10

## 2017-08-10 RX ORDER — METOPROLOL SUCCINATE 25 MG/1
25 TABLET, EXTENDED RELEASE ORAL
Qty: 30 | Refills: 3 | Status: DISCONTINUED | COMMUNITY
Start: 2017-03-01 | End: 2017-08-10

## 2017-08-11 LAB
ALBUMIN SERPL ELPH-MCNC: 3.9 G/DL
ALP BLD-CCNC: 126 U/L
ALT SERPL-CCNC: 24 U/L RC
ANION GAP SERPL CALC-SCNC: 14 MMOL/L
AST SERPL-CCNC: 33 U/L
BILIRUB SERPL-MCNC: 0.5 MG/DL
BUN SERPL-MCNC: 29 MG/DL
CALCIUM SERPL-MCNC: 9.5 MG/DL
CHLORIDE SERPL-SCNC: 103 MMOL/L
CO2 SERPL-SCNC: 22 MMOL/L
CREAT SERPL-MCNC: 1.37 MG/DL
GLUCOSE SERPL-MCNC: 90 MG/DL
INR PPP: 1.75 RATIO
LDH SERPL-CCNC: 277 U/L
POTASSIUM SERPL-SCNC: 5.1 MMOL/L
PROT SERPL-MCNC: 8.8 G/DL
PT BLD: 19.3 SEC
SODIUM SERPL-SCNC: 139 MMOL/L

## 2017-08-11 RX ORDER — TORSEMIDE 20 MG/1
20 TABLET ORAL
Qty: 180 | Refills: 0 | Status: DISCONTINUED | COMMUNITY
End: 2017-08-11

## 2017-08-15 ENCOUNTER — OTHER (OUTPATIENT)
Age: 67
End: 2017-08-15

## 2017-08-17 ENCOUNTER — APPOINTMENT (OUTPATIENT)
Dept: CARDIOLOGY | Facility: CLINIC | Age: 67
End: 2017-08-17
Payer: MEDICARE

## 2017-08-17 DIAGNOSIS — I50.22 CHRONIC SYSTOLIC (CONGESTIVE) HEART FAILURE: ICD-10-CM

## 2017-08-17 DIAGNOSIS — Z86.79 PERSONAL HISTORY OF OTHER DISEASES OF THE CIRCULATORY SYSTEM: ICD-10-CM

## 2017-08-17 DIAGNOSIS — I50.9 HEART FAILURE, UNSPECIFIED: ICD-10-CM

## 2017-08-17 LAB
INR PPP: 2.94 RATIO
PT BLD: 34 SEC

## 2017-08-17 PROCEDURE — 99215 OFFICE O/P EST HI 40 MIN: CPT | Mod: 25

## 2017-08-17 PROCEDURE — 93750 INTERROGATION VAD IN PERSON: CPT

## 2017-08-18 LAB — LDH SERPL-CCNC: 285 U/L

## 2017-08-21 ENCOUNTER — INPATIENT (INPATIENT)
Facility: HOSPITAL | Age: 67
LOS: 6 days | Discharge: HOME | DRG: 345 | End: 2017-08-28
Attending: THORACIC SURGERY (CARDIOTHORACIC VASCULAR SURGERY) | Admitting: INTERNAL MEDICINE
Payer: MEDICARE

## 2017-08-21 VITALS — OXYGEN SATURATION: 100 % | HEART RATE: 32 BPM | RESPIRATION RATE: 22 BRPM

## 2017-08-21 DIAGNOSIS — D50.0 IRON DEFICIENCY ANEMIA SECONDARY TO BLOOD LOSS (CHRONIC): ICD-10-CM

## 2017-08-21 DIAGNOSIS — K92.1 MELENA: ICD-10-CM

## 2017-08-21 DIAGNOSIS — Z95.811 PRESENCE OF HEART ASSIST DEVICE: ICD-10-CM

## 2017-08-21 DIAGNOSIS — K92.2 GASTROINTESTINAL HEMORRHAGE, UNSPECIFIED: ICD-10-CM

## 2017-08-21 DIAGNOSIS — Z95.811 PRESENCE OF HEART ASSIST DEVICE: Chronic | ICD-10-CM

## 2017-08-21 LAB
ALBUMIN SERPL ELPH-MCNC: 3.3 G/DL — SIGNIFICANT CHANGE UP (ref 3.3–5)
ALBUMIN SERPL ELPH-MCNC: 3.3 G/DL — SIGNIFICANT CHANGE UP (ref 3.3–5)
ALP SERPL-CCNC: 84 U/L — SIGNIFICANT CHANGE UP (ref 40–120)
ALP SERPL-CCNC: 89 U/L — SIGNIFICANT CHANGE UP (ref 40–120)
ALT FLD-CCNC: 23 U/L RC — SIGNIFICANT CHANGE UP (ref 10–45)
ALT FLD-CCNC: 28 U/L RC — SIGNIFICANT CHANGE UP (ref 10–45)
ANION GAP SERPL CALC-SCNC: 13 MMOL/L — SIGNIFICANT CHANGE UP (ref 5–17)
ANION GAP SERPL CALC-SCNC: 14 MMOL/L — SIGNIFICANT CHANGE UP (ref 5–17)
APTT BLD: 37.9 SEC — HIGH (ref 27.5–37.4)
APTT BLD: 41.6 SEC — HIGH (ref 27.5–37.4)
AST SERPL-CCNC: 30 U/L — SIGNIFICANT CHANGE UP (ref 10–40)
AST SERPL-CCNC: 33 U/L — SIGNIFICANT CHANGE UP (ref 10–40)
BILIRUB SERPL-MCNC: 0.4 MG/DL — SIGNIFICANT CHANGE UP (ref 0.2–1.2)
BILIRUB SERPL-MCNC: 0.6 MG/DL — SIGNIFICANT CHANGE UP (ref 0.2–1.2)
BLD GP AB SCN SERPL QL: NEGATIVE — SIGNIFICANT CHANGE UP
BUN SERPL-MCNC: 71 MG/DL — HIGH (ref 7–23)
BUN SERPL-MCNC: 74 MG/DL — HIGH (ref 7–23)
CALCIUM SERPL-MCNC: 8 MG/DL — LOW (ref 8.4–10.5)
CALCIUM SERPL-MCNC: 8.2 MG/DL — LOW (ref 8.4–10.5)
CHLORIDE SERPL-SCNC: 105 MMOL/L — SIGNIFICANT CHANGE UP (ref 96–108)
CHLORIDE SERPL-SCNC: 108 MMOL/L — SIGNIFICANT CHANGE UP (ref 96–108)
CO2 SERPL-SCNC: 16 MMOL/L — LOW (ref 22–31)
CO2 SERPL-SCNC: 17 MMOL/L — LOW (ref 22–31)
CREAT SERPL-MCNC: 1.44 MG/DL — HIGH (ref 0.5–1.3)
CREAT SERPL-MCNC: 1.88 MG/DL — HIGH (ref 0.5–1.3)
GAS PNL BLDA: SIGNIFICANT CHANGE UP
GLUCOSE SERPL-MCNC: 104 MG/DL — HIGH (ref 70–99)
GLUCOSE SERPL-MCNC: 97 MG/DL — SIGNIFICANT CHANGE UP (ref 70–99)
HAPTOGLOB SERPL-MCNC: 112 MG/DL — SIGNIFICANT CHANGE UP (ref 34–200)
HCT VFR BLD CALC: 18.5 % — CRITICAL LOW (ref 39–50)
HCT VFR BLD CALC: 23.3 % — LOW (ref 39–50)
HGB BLD-MCNC: 5.8 G/DL — CRITICAL LOW (ref 13–17)
HGB BLD-MCNC: 7.9 G/DL — LOW (ref 13–17)
INR BLD: 4.22 RATIO — HIGH (ref 0.88–1.16)
INR BLD: 4.36 RATIO — HIGH (ref 0.88–1.16)
LDH SERPL L TO P-CCNC: 204 U/L — SIGNIFICANT CHANGE UP (ref 50–242)
MCHC RBC-ENTMCNC: 27.9 PG — SIGNIFICANT CHANGE UP (ref 27–34)
MCHC RBC-ENTMCNC: 29.6 PG — SIGNIFICANT CHANGE UP (ref 27–34)
MCHC RBC-ENTMCNC: 31.6 GM/DL — LOW (ref 32–36)
MCHC RBC-ENTMCNC: 34 GM/DL — SIGNIFICANT CHANGE UP (ref 32–36)
MCV RBC AUTO: 87 FL — SIGNIFICANT CHANGE UP (ref 80–100)
MCV RBC AUTO: 88.4 FL — SIGNIFICANT CHANGE UP (ref 80–100)
PLATELET # BLD AUTO: 212 K/UL — SIGNIFICANT CHANGE UP (ref 150–400)
PLATELET # BLD AUTO: 266 K/UL — SIGNIFICANT CHANGE UP (ref 150–400)
POTASSIUM SERPL-MCNC: 4.4 MMOL/L — SIGNIFICANT CHANGE UP (ref 3.5–5.3)
POTASSIUM SERPL-MCNC: 4.8 MMOL/L — SIGNIFICANT CHANGE UP (ref 3.5–5.3)
POTASSIUM SERPL-SCNC: 4.4 MMOL/L — SIGNIFICANT CHANGE UP (ref 3.5–5.3)
POTASSIUM SERPL-SCNC: 4.8 MMOL/L — SIGNIFICANT CHANGE UP (ref 3.5–5.3)
PROT SERPL-MCNC: 6.6 G/DL — SIGNIFICANT CHANGE UP (ref 6–8.3)
PROT SERPL-MCNC: 6.8 G/DL — SIGNIFICANT CHANGE UP (ref 6–8.3)
PROTHROM AB SERPL-ACNC: 46.9 SEC — HIGH (ref 9.8–12.7)
PROTHROM AB SERPL-ACNC: 49 SEC — HIGH (ref 9.8–12.7)
RBC # BLD: 2.09 M/UL — LOW (ref 4.2–5.8)
RBC # BLD: 2.67 M/UL — LOW (ref 4.2–5.8)
RBC # FLD: 15.7 % — HIGH (ref 10.3–14.5)
RBC # FLD: 17.2 % — HIGH (ref 10.3–14.5)
RH IG SCN BLD-IMP: POSITIVE — SIGNIFICANT CHANGE UP
SODIUM SERPL-SCNC: 135 MMOL/L — SIGNIFICANT CHANGE UP (ref 135–145)
SODIUM SERPL-SCNC: 138 MMOL/L — SIGNIFICANT CHANGE UP (ref 135–145)
WBC # BLD: 10.6 K/UL — HIGH (ref 3.8–10.5)
WBC # BLD: 11.4 K/UL — HIGH (ref 3.8–10.5)
WBC # FLD AUTO: 10.6 K/UL — HIGH (ref 3.8–10.5)
WBC # FLD AUTO: 11.4 K/UL — HIGH (ref 3.8–10.5)

## 2017-08-21 PROCEDURE — 93750 INTERROGATION VAD IN PERSON: CPT

## 2017-08-21 PROCEDURE — 71010: CPT | Mod: 26

## 2017-08-21 PROCEDURE — 71010: CPT | Mod: 26,77

## 2017-08-21 PROCEDURE — 99291 CRITICAL CARE FIRST HOUR: CPT

## 2017-08-21 PROCEDURE — 99285 EMERGENCY DEPT VISIT HI MDM: CPT | Mod: GC

## 2017-08-21 PROCEDURE — 99223 1ST HOSP IP/OBS HIGH 75: CPT | Mod: 25,GC

## 2017-08-21 PROCEDURE — 70450 CT HEAD/BRAIN W/O DYE: CPT | Mod: 26

## 2017-08-21 PROCEDURE — 90832 PSYTX W PT 30 MINUTES: CPT

## 2017-08-21 RX ORDER — SODIUM CHLORIDE 9 MG/ML
1000 INJECTION, SOLUTION INTRAVENOUS
Qty: 0 | Refills: 0 | Status: DISCONTINUED | OUTPATIENT
Start: 2017-08-21 | End: 2017-08-21

## 2017-08-21 RX ORDER — SODIUM CHLORIDE 9 MG/ML
250 INJECTION, SOLUTION INTRAVENOUS ONCE
Qty: 0 | Refills: 0 | Status: DISCONTINUED | OUTPATIENT
Start: 2017-08-21 | End: 2017-08-21

## 2017-08-21 RX ORDER — FUROSEMIDE 40 MG
20 TABLET ORAL DAILY
Qty: 0 | Refills: 0 | Status: DISCONTINUED | OUTPATIENT
Start: 2017-08-21 | End: 2017-08-23

## 2017-08-21 RX ORDER — ASCORBIC ACID 60 MG
500 TABLET,CHEWABLE ORAL DAILY
Qty: 0 | Refills: 0 | Status: DISCONTINUED | OUTPATIENT
Start: 2017-08-21 | End: 2017-08-28

## 2017-08-21 RX ORDER — MAGNESIUM SULFATE 500 MG/ML
2 VIAL (ML) INJECTION ONCE
Qty: 0 | Refills: 0 | Status: COMPLETED | OUTPATIENT
Start: 2017-08-21 | End: 2017-08-21

## 2017-08-21 RX ORDER — FUROSEMIDE 40 MG
20 TABLET ORAL DAILY
Qty: 0 | Refills: 0 | Status: DISCONTINUED | OUTPATIENT
Start: 2017-08-21 | End: 2017-08-21

## 2017-08-21 RX ORDER — FERROUS SULFATE 325(65) MG
325 TABLET ORAL DAILY
Qty: 0 | Refills: 0 | Status: DISCONTINUED | OUTPATIENT
Start: 2017-08-21 | End: 2017-08-28

## 2017-08-21 RX ORDER — SODIUM CHLORIDE 9 MG/ML
500 INJECTION INTRAMUSCULAR; INTRAVENOUS; SUBCUTANEOUS ONCE
Qty: 0 | Refills: 0 | Status: COMPLETED | OUTPATIENT
Start: 2017-08-21 | End: 2017-08-21

## 2017-08-21 RX ORDER — ONDANSETRON 8 MG/1
4 TABLET, FILM COATED ORAL ONCE
Qty: 0 | Refills: 0 | Status: DISCONTINUED | OUTPATIENT
Start: 2017-08-21 | End: 2017-08-21

## 2017-08-21 RX ORDER — ALBUMIN HUMAN 25 %
250 VIAL (ML) INTRAVENOUS ONCE
Qty: 0 | Refills: 0 | Status: COMPLETED | OUTPATIENT
Start: 2017-08-21 | End: 2017-08-21

## 2017-08-21 RX ORDER — PHENYLEPHRINE HYDROCHLORIDE 10 MG/ML
0.4 INJECTION INTRAVENOUS
Qty: 80 | Refills: 0 | Status: DISCONTINUED | OUTPATIENT
Start: 2017-08-21 | End: 2017-08-21

## 2017-08-21 RX ORDER — POTASSIUM CHLORIDE 20 MEQ
10 PACKET (EA) ORAL
Qty: 0 | Refills: 0 | Status: COMPLETED | OUTPATIENT
Start: 2017-08-21 | End: 2017-08-22

## 2017-08-21 RX ORDER — PANTOPRAZOLE SODIUM 20 MG/1
80 TABLET, DELAYED RELEASE ORAL ONCE
Qty: 0 | Refills: 0 | Status: DISCONTINUED | OUTPATIENT
Start: 2017-08-21 | End: 2017-08-21

## 2017-08-21 RX ORDER — MEXILETINE HYDROCHLORIDE 150 MG/1
150 CAPSULE ORAL EVERY 8 HOURS
Qty: 0 | Refills: 0 | Status: DISCONTINUED | OUTPATIENT
Start: 2017-08-21 | End: 2017-08-28

## 2017-08-21 RX ORDER — ONDANSETRON 8 MG/1
4 TABLET, FILM COATED ORAL ONCE
Qty: 0 | Refills: 0 | Status: COMPLETED | OUTPATIENT
Start: 2017-08-21 | End: 2017-08-21

## 2017-08-21 RX ORDER — PANTOPRAZOLE SODIUM 20 MG/1
40 TABLET, DELAYED RELEASE ORAL EVERY 12 HOURS
Qty: 0 | Refills: 0 | Status: DISCONTINUED | OUTPATIENT
Start: 2017-08-21 | End: 2017-08-28

## 2017-08-21 RX ORDER — DOCUSATE SODIUM 100 MG
100 CAPSULE ORAL THREE TIMES A DAY
Qty: 0 | Refills: 0 | Status: DISCONTINUED | OUTPATIENT
Start: 2017-08-21 | End: 2017-08-28

## 2017-08-21 RX ORDER — FOLIC ACID 0.8 MG
1 TABLET ORAL DAILY
Qty: 0 | Refills: 0 | Status: DISCONTINUED | OUTPATIENT
Start: 2017-08-21 | End: 2017-08-28

## 2017-08-21 RX ORDER — AMIODARONE HYDROCHLORIDE 400 MG/1
200 TABLET ORAL DAILY
Qty: 0 | Refills: 0 | Status: DISCONTINUED | OUTPATIENT
Start: 2017-08-21 | End: 2017-08-28

## 2017-08-21 RX ADMIN — MEXILETINE HYDROCHLORIDE 150 MILLIGRAM(S): 150 CAPSULE ORAL at 16:11

## 2017-08-21 RX ADMIN — MEXILETINE HYDROCHLORIDE 150 MILLIGRAM(S): 150 CAPSULE ORAL at 06:26

## 2017-08-21 RX ADMIN — MEXILETINE HYDROCHLORIDE 150 MILLIGRAM(S): 150 CAPSULE ORAL at 21:35

## 2017-08-21 RX ADMIN — SODIUM CHLORIDE 500 MILLILITER(S): 9 INJECTION INTRAMUSCULAR; INTRAVENOUS; SUBCUTANEOUS at 03:18

## 2017-08-21 RX ADMIN — SODIUM CHLORIDE 100 MILLILITER(S): 9 INJECTION, SOLUTION INTRAVENOUS at 06:27

## 2017-08-21 RX ADMIN — PANTOPRAZOLE SODIUM 40 MILLIGRAM(S): 20 TABLET, DELAYED RELEASE ORAL at 06:25

## 2017-08-21 RX ADMIN — Medication 125 MILLILITER(S): at 06:29

## 2017-08-21 RX ADMIN — Medication 1 MILLIGRAM(S): at 12:54

## 2017-08-21 RX ADMIN — AMIODARONE HYDROCHLORIDE 200 MILLIGRAM(S): 400 TABLET ORAL at 06:25

## 2017-08-21 RX ADMIN — Medication 50 GRAM(S): at 23:47

## 2017-08-21 RX ADMIN — Medication 20 MILLIGRAM(S): at 16:12

## 2017-08-21 RX ADMIN — PANTOPRAZOLE SODIUM 40 MILLIGRAM(S): 20 TABLET, DELAYED RELEASE ORAL at 18:22

## 2017-08-21 RX ADMIN — Medication 500 MILLIGRAM(S): at 12:54

## 2017-08-21 RX ADMIN — Medication 100 MILLIEQUIVALENT(S): at 23:00

## 2017-08-21 RX ADMIN — Medication 325 MILLIGRAM(S): at 12:54

## 2017-08-21 NOTE — H&P ADULT - PROBLEM SELECTOR PLAN 2
2 PRBCs ordered. Monitor H/H, melena. Consider GI consult. Protonix 40mg IV BID. Hold Coumadin for elevated INR.

## 2017-08-21 NOTE — ED PROVIDER NOTE - PSH
AICD (Automatic Cardioverter/Defibrillator) Present  [employer] with 1 [employer] lead4/1/09- explanted and replaced with [email or web address] 2 leads on 9/2/09  History of Prior Ablation Treatment  for afib  LVAD (left ventricular assist device) present    Status post left hip replacement

## 2017-08-21 NOTE — PROCEDURE NOTE - NSPROCDETAILS_GEN_ALL_CORE
location identified, draped/prepped, sterile technique used, needle inserted/introduced/hemostasis with direct pressure, dressing applied/sutured in place/positive blood return obtained via catheter/Seldinger technique/all materials/supplies accounted for at end of procedure/connected to a pressurized flush line

## 2017-08-21 NOTE — PROGRESS NOTE ADULT - SUBJECTIVE AND OBJECTIVE BOX
HPI:  67 year old man with chronic systolic heart failure, ACC/AHA stage D, due to nonischemic cardiomyopathy, s/p LVAD 6/12/17, GIB s/p cautery 7/25/17 now presents to [facility] ED with decreased PO intake and altered mental status as per brother, Nawaf, who accompanies him, since Saturday 8/19/17. The patient went to the LVAD clinic for an appt on Friday, 8/18 where he seemed slightly lethargic for which they gave him food and water which seemed to help and he went home. Nawaf reported that the patient is normally very energetic and talkative and has a good appetite, but since Saturday has has decreased PO intake and been much more lethargic than usual as well as having one episode of melena. The patient had 1 episode of emesis on Sunday night, 8/20/17 around 9pm. The patient was found to be guaiac + in the ED. Denies any recent fevers, chills, diarrhea, recent travel, pain at the LVAD driveline site. (21 Aug 2017 02:06)    Patient brought to CTU for further evaluation and treatment. Secure IV lines and arterial line placed. IVF ordered as patient was cool and clammy, pale conjunctiva with slowed mentation and response time. Hct noted to be 18 on lab values. 2 units PRBC's ordered. INR 4.1. LVAD parameters (PI, flow) seem to be stable based on values noted in clinic Friday. Patient improved with hydration. Coumadin to be held. Now that patient is more alert, he reports a limited diet and in part appears to be dehydrated.    Plan is to continue medications as taken at home. HPI:  67 year old man with chronic systolic heart failure, ACC/AHA stage D, due to nonischemic cardiomyopathy, s/p LVAD 6/12/17, GIB s/p cautery 7/25/17 now presents to [facility] ED with decreased PO intake and altered mental status as per brother, Nawaf, who accompanies him, since Saturday 8/19/17. The patient went to the LVAD clinic for an appt on Friday, 8/18 where he seemed slightly lethargic for which they gave him food and water which seemed to help and he went home. Nawaf reported that the patient is normally very energetic and talkative and has a good appetite, but since Saturday has has decreased PO intake and been much more lethargic than usual as well as having one episode of melena. The patient had 1 episode of emesis on Sunday night, 8/20/17 around 9pm. The patient was found to be guaiac + in the ED. Denies any recent fevers, chills, diarrhea, recent travel, pain at the LVAD driveline site. (21 Aug 2017 02:06)    Patient brought to CTU for further evaluation and treatment. Secure IV lines and arterial line placed. IVF ordered as patient was cool and clammy, pale conjunctiva with slowed mentation and response time. Hct noted to be 18 on lab values. 2 units PRBC's ordered. INR 4.1. LVAD parameters (PI, flow) seem to be stable based on values noted in clinic Friday. Patient improved with hydration. Coumadin to be held. Now that patient is more alert, he reports a limited diet and in part appears to be dehydrated. Lactate normal. Hydration ordered.    Patient requires continuous monitoring with EKG, arterial line, pulse oximetry, as well as VAD parameters. Plan for reconsultation of GI. Of note is that patient has a h/o a small gastric ulcer as well as small bowel av malformations. Will check follow up hematocrit.    Care plan discussed with ICU care team and heart failure team. I have spent 45 minutes providing non routine post op care for this critically ill patient.

## 2017-08-21 NOTE — ED PROVIDER NOTE - ATTENDING CONTRIBUTION TO CARE
I was physically present for the E/M service provided. I agree with above history, physical, and plan which I have reviewed and edited where appropriate. I was physically present for the key portions of the service provided.    67M h/o LVAD on coumadin BIBEMS for lethargy and dark stools.  -NAD, lungs CTA, abdomen sfot NTND.

## 2017-08-21 NOTE — ED PROVIDER NOTE - OBJECTIVE STATEMENT
Ronit Klein, DO: 57M with hx of CHF s/p LVAD here, HTN here for NBNB vomiting x 2 episodes today. Denies fevers/chills, nausea/vomiting, CP/SOB, cough, abdominal pain, diarrhea, dysuria. Pt with decreased PO intake & dark/bloody stools. Ronit Klein, DO: 57M with hx of CHF s/p LVAD here, HTN here for NBNB vomiting x 2 episodes today & anorexia. Denies fevers/chills, nausea/vomiting, CP/SOB, cough, abdominal pain, diarrhea, dysuria. Pt with decreased PO intake & dark/bloody stools.

## 2017-08-21 NOTE — PROGRESS NOTE ADULT - SUBJECTIVE AND OBJECTIVE BOX
Behavioral Cardiology Progress Note     HPI:  66 y/o male with advanced heart failure (NICM); s/p LVAD implant (6/12/17), course complicated by GIB found to be 2/2 AVMs s/p APC with push enteroscopy (7/6/17), discharged to [facility] (7/17/17); admitted from rehab with c/o melena x 3 days with suspicion of another GIB, s/p cautery (7/25/17); presented to [facility] ED with decreased PO intake, vomiting, melena, and AMS.  Found to be hypotensive, admitted to CTU for further management; found to be anemic, transfused 2U PRBC’s.         67 year old man with chronic systolic heart failure, ACC/AHA stage D, due to nonischemic cardiomyopathy, s/p LVAD 6/12/17, GIB now presents to [facility] ED with decreased PO intake and altered mental status as per brother, Nawaf, who accompanies him, since Saturday 8/19/17. The patient went to the LVAD clinic for an appt on Friday, 8/18 where he seemed slightly lethargic for which they gave him food and water which seemed to help and he went home. aNwaf reported that the patient is normally very energetic and talkative and has a good appetite, but since Saturday has has decreased PO intake and been much more lethargic than usual as well as having one episode of melena. The patient had 1 episode of emesis on Sunday night, 8/20/17 around 9pm. The patient was found to be guaiac + in the ED. Denies any recent fevers, chills, diarrhea, recent travel, pain at the LVAD driveline site.  Behavioral Health Assessment:     Mood:  "feeling a little better."         Current stressors:   Readmission to hospital   Adjustment to living with LVAD        Support system/family support: Good support from family and close friend      Coping strategies: Talking with family and staff, watching TV; thinking about his granddaughter     Understanding of medical illness and treatment plan:  Good understanding of LVAD management; will benefit from ongoing education.      MSE: Pt seen resting in bed.  A&Ox3.  Well related with good eye contact.  Thought process goal directed.  No abnormal thought content; denies s/i.  Mood upbeat.  Affect full range.

## 2017-08-21 NOTE — ED PROVIDER NOTE - CARE PLAN
Principal Discharge DX:	GIB (gastrointestinal bleeding)  Secondary Diagnosis:	LVAD (left ventricular assist device) present

## 2017-08-21 NOTE — ED PROVIDER NOTE - NS ED ROS FT
Ronit Klein, DO: ROS: denies HA, weakness, dizziness, diaphoresis, abdominal pain, diarrhea, joint pain, neuro deficits, dysuria/hematuria, rash.

## 2017-08-21 NOTE — PROGRESS NOTE ADULT - ASSESSMENT
Reports he feels better today but with poor sleep last night.  Appetite "good."  Ate this morning but now NPO for testing.  Brother (Nawaf) at bedside.   Close friend Tahira and brother Nawaf doing dressing changes at home.  Mood upbeat despite readmission.   Open to discussing his feelings; receptive to support and validation.       DX:  Systolic heart failure; r/o Adjustment disorder with anxiety/depression     Recommendations:   Behavioral Cardiology will continue to follow   Review LVAD management with teach back.

## 2017-08-21 NOTE — CONSULT NOTE ADULT - ASSESSMENT
67M NICM EF 20%, ACC/AHA Stage D s/p LVAD 6/12/2017, hx of GIB/AVM's who presents with symptomatic blood loss anemia, currently hemodynamically stable with stable MAP's and improved symptoms.    #LVAD present - no events. presented with supratherapeutic INR  -hold further AC for now given ongoing blood loss/ f/u INR  -MAP goal 70-80. patient on maintenance IVF with stable MAP and no evidence of volume overload at this time  -hold carvedilol 6.25mg BID  -hold PO furosemide 20mg    #NORMAN - likely hypovolemia/hypotension. Cr starting to improve  -hold spironolactone 25mg, lisinopril 2.5mg daily    #blood loss anemia - awaiting further evaluation  -NPO  -f/u H/H - currently stable  -GI eval  -Hgb goal >7

## 2017-08-21 NOTE — H&P ADULT - ASSESSMENT
67 year old man with chronic systolic heart failure, ACC/AHA stage D, due to nonischemic cardiomyopathy, s/p LVAD 6/12/17, GIB s/p cautery now presents to [facility] ED with decreased PO intake and altered mental status. CT head noncon negative. Dehydrated, malnourished. Anemic with H/H 5.8/18. INR 4.36.

## 2017-08-21 NOTE — H&P ADULT - NSHPPHYSICALEXAM_GEN_ALL_CORE
General: A&Ox2, NAD. Poor hygiene noted.  Neuro: Appears mildly confused. No focal deficits.   Card: - S1, S2. + LVAD sounds. SR 90s on telemetry.  Pulm: CTA b/l  Abd: soft, nontender, nondistended. + BS sounds. Driveline site without erythema or discharge.  Extrem: Negative LE edema, calf tenderness. + DP pulses.

## 2017-08-21 NOTE — ED PROVIDER NOTE - MEDICAL DECISION MAKING DETAILS
Ronit Klein, DO: Pt with LVAD in-house with melanotic stools here for lethargy. Labs & CTH to evaluate for ? AMS.

## 2017-08-21 NOTE — H&P ADULT - NSHPLABSRESULTS_GEN_ALL_CORE
CT of the head was performed without intravenous contrast: IMPRESSION: No intracranial hemorrhage or CT evidence of acute transcortical infarct. CT of the head was performed without intravenous contrast: IMPRESSION: No intracranial hemorrhage or CT evidence of acute transcortical infarct.    LVAD stats on Friday 8/18 at LVAD clinic: Speed 9200RPM, Flow 4.5L/min, Power 5.2W, PI 7.0, MAP 50  LVAD stats on Monday 8/19 when admitted to CTU: Speed 9200RPM, Flow 4.6L/min, Power 5.2W, PI 7.5, MAP 84

## 2017-08-21 NOTE — H&P ADULT - PSH
AICD (Automatic Cardioverter/Defibrillator) Present  [employer] with 1 [employer] lead4/1/09- explanted and replaced with [employer] leads on 9/2/09  History of Prior Ablation Treatment  for afib  LVAD (left ventricular assist device) present    Status post left hip replacement

## 2017-08-21 NOTE — CONSULT NOTE ADULT - ATTENDING COMMENTS
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 Initially hypotensive and given fluids, 2 U PRBC with improvement. Has had 1-2 BMs since admission with possible melena. INR was 4.3 on admission. Repeat CBC appropriately bumped. On exam, appears well, JVD elevated, nl LVAD hum, no pedal edema. LVAD interrogated with PI in 7-8 range w/o alarms, at speed 9200. Labs also notabe for BUN/Cr 71/1.44 (from 16/1.12).   - please contact GI regarding possible repeat EGD  - monitor for bleed; ensure adequate access  - hold coumadin; if H/H dropping and further bleed, give 2.5 vitamin K PO  - resume home diuretics  - titrate BP to systolics 100-120 given pulsatility

## 2017-08-21 NOTE — H&P ADULT - HISTORY OF PRESENT ILLNESS
67 year old man with chronic systolic heart failure, ACC/AHA stage D, due to nonischemic cardiomyopathy, s/p LVAD 6/12/17, GIB now presents to [facility] ED with decreased PO intake and altered mental status as per brother, Nawaf, who accompanies him, since Saturday 8/19/17. Nawaf reported that the patient is normally very energetic and talkative and has a good appetite, but since Saturday has has decreased PO intake and been much more lethargic than usual. The patient had 1 episode of emesis on Sunday night, 8/20/17 around 9pm. The patient also had an episode of melena on Sunday night. The patient was found to be guaiac + in the ED. Denies any recent fevers, chills, diarrhea, recent travel, pain at the LVAD driveline site. 67 year old man with chronic systolic heart failure, ACC/AHA stage D, due to nonischemic cardiomyopathy, s/p LVAD 6/12/17, GIB now presents to [facility] ED with decreased PO intake and altered mental status as per brother, Nawaf, who accompanies him, since Saturday 8/19/17. The patient went to the LVAD clinic for an appt on Friday, 8/18 where he seemed slightly lethargic for which they gave him food and water which seemed to help and he went home. Nawaf reported that the patient is normally very energetic and talkative and has a good appetite, but since Saturday has has decreased PO intake and been much more lethargic than usual as well as having one episode of melena. The patient had 1 episode of emesis on Sunday night, 8/20/17 around 9pm. The patient was found to be guaiac + in the ED. Denies any recent fevers, chills, diarrhea, recent travel, pain at the LVAD driveline site. 67 year old man with chronic systolic heart failure, ACC/AHA stage D, due to nonischemic cardiomyopathy, s/p LVAD 6/12/17, GIB s/p cautery 7/25/17 now presents to [facility] ED with decreased PO intake and altered mental status as per brother, Nawaf, who accompanies him, since Saturday 8/19/17. The patient went to the LVAD clinic for an appt on Friday, 8/18 where he seemed slightly lethargic for which they gave him food and water which seemed to help and he went home. Nawaf reported that the patient is normally very energetic and talkative and has a good appetite, but since Saturday has has decreased PO intake and been much more lethargic than usual as well as having one episode of melena. The patient had 1 episode of emesis on Sunday night, 8/20/17 around 9pm. The patient was found to be guaiac + in the ED. Denies any recent fevers, chills, diarrhea, recent travel, pain at the LVAD driveline site.

## 2017-08-21 NOTE — ED PROVIDER NOTE - PHYSICAL EXAMINATION
Ronit Klein, DO:   Gen: Well appearing, NAD, AAO x 3, battery charged  Head: NCAT  HEENT: PERRL, MMM, normal conjunctiva, anicteric, neck supple  Lung: CTAB, no adventitious sounds  CV: RRR, no murmurs, rubs or gallops  Abd: soft, NTND, no rebound or guarding  MSK: No edema, no visible deformities  Neuro: No focal neurologic deficits. CN II-XII grossly intact. 5/5 strength and normal sensation in all extremities.  Skin: Warm and dry, no evidence of rash  Psych: normal mood and affect Ronit Klein, DO:   Gen: Well appearing, NAD, AAO x 3, battery charged  Head: NCAT  HEENT: PERRL, MMM, normal conjunctiva, anicteric, neck supple  Lung: CTAB, no adventitious sounds  CV: RRR, no murmurs, rubs or gallops  Abd: soft, NTND, no rebound or guarding, grossly melanotic stools, hemoccult + (Chaperone: Dr. Daniel).   MSK: No edema, no visible deformities  Neuro: No focal neurologic deficits. CN II-XII grossly intact. 5/5 strength and normal sensation in all extremities.  Skin: Warm and dry, no evidence of rash  Psych: normal mood and affect

## 2017-08-21 NOTE — ED ADULT NURSE NOTE - PSH
AICD (Automatic Cardioverter/Defibrillator) Present  [employer] with 1 [employer] lead4/1/09- explanted and replaced with [employer] 2 leads on 9/2/09  History of Prior Ablation Treatment  for afib  LVAD (left ventricular assist device) present    Status post left hip replacement

## 2017-08-21 NOTE — H&P ADULT - PROBLEM SELECTOR PLAN 1
2 PRBCs ordered. Monitor H/H, melena. Hold Coumadin. Continue ferrous sulfate, folic acid, ascorbic acid. NS 500cc for dehydration. Admit to CTU. 2 PRBCs ordered. Monitor H/H, melena. Hold Coumadin. Continue ferrous sulfate, folic acid, ascorbic acid. NS 500cc for dehydration.

## 2017-08-21 NOTE — CONSULT NOTE ADULT - SUBJECTIVE AND OBJECTIVE BOX
Heart Failure Initial Consult  Fellow Pager [phone]  After 5:00PM or weekends call Cardiology On-Call [facility] [phone]     CHIEF COMPLAINT:  AMS, blood loss anemia    HISTORY OF PRESENT ILLNESS:  67M NICM EF 20%, ACC/AHA Stage D s/p LVAD 6/12/2017 who presented to the ED with decreased PO intake, vomiting, and AMS with melena. Was hypotensive. Admitted to the CTU for further management, where he was found to be anemic with a log hgb and transfused 2 U PRBC's and started on some IVF. Pt with a hx of a gastric ulcer as well as small bowel AVM's requiring laser coagulation/clips/cautery. Patient with good appetite this AM and ate breakfast. Had another episode of melena this AM.      Allergies  No Known Allergies    MEDICATIONS:  mexiletine 150 milliGRAM(s) Oral every 8 hours  amiodarone    Tablet 200 milliGRAM(s) Oral daily  phenylephrine    Infusion 0.4 MICROgram(s)/kG/Min IV Continuous <Continuous>  pantoprazole  Injectable 40 milliGRAM(s) IV Push every 12 hours  docusate sodium 100 milliGRAM(s) Oral three times a day  ferrous    sulfate 325 milliGRAM(s) Oral daily  folic acid 1 milliGRAM(s) Oral daily  ascorbic acid 500 milliGRAM(s) Oral daily  multiple electrolytes Injection Type 1 Bolus 250 milliLiter(s) IV Bolus once  multiple electrolytes Injection Type 1 1000 milliLiter(s) IV Continuous <Continuous>    PAST MEDICAL & SURGICAL HISTORY:  GIB (gastrointestinal bleeding)  Ventricular fibrillation: s/p AICD  PAF (paroxysmal atrial fibrillation): on xarelto  Non-Ischemic Cardiomyopathy  SVT (Supraventricular Tachycardia)  HTN  CHF (Congestive Heart Failure)  LVAD (left ventricular assist device) present  Status post left hip replacement  History of Prior Ablation Treatment: for afib  AICD (Automatic Cardioverter/Defibrillator) Present: [employer] with 1 [employer] lead4/1/09- explanted and replaced with [employer] 2 leads on 9/2/09      FAMILY HISTORY:  No pertinent family history in first degree relatives      SOCIAL HISTORY:    [x] Non-smoker  [ ] Smoker  [ ] Alcohol    Review of Systems:  Constitutional: [- ] Fever [- ] Chills [ ] Fatigue [ ] Weight change   HEENT: [ ] Blurred vision [ ] Eye Pain [ -] Headache [ ] Runny nose [ ] Sore Throat   Respiratory: [ ] Cough [ ] Wheezing [- ] Shortness of breath  Cardiovascular: [- ] Chest Pain [- ] Palpitations [ ] LOBATO [ ] PND [ ] Orthopnea  Gastrointestinal: [- ] Abdominal Pain [ ] Diarrhea [ ] Constipation [ ] Hemorrhoids [- ] Nausea [- ] Vomiting  Genitourinary: [ ] Nocturia [- ] Dysuria [ ] Incontinence  Extremities: [- ] Swelling [ -] Joint Pain  Neurologic: [ ] Focal deficit [ ] Paresthesias [- ] Syncope  Skin: [- ] Rash [ ] Ecchymoses [ ] Wounds [ ] Lesions  Psychiatry: [- ] Depression [ ] Suicidal/Homicidal Ideation [ ] Anxiety [ ] Sleep Disturbances  [x ] 10 point review of systems is otherwise negative except as mentioned above            [ ]Unable to obtain    PHYSICAL EXAM:  Vitals:  T(C): 36.7 (08-21-17 @ 07:00), Max: 36.8 (08-21-17 @ 05:00)  HR: 91 (08-21-17 @ 10:00) (32 - 98)  BP: 64/47 (08-21-17 @ 04:30) (64/47 - 115/81)  BP(mean): 52 (08-21-17 @ 04:30) (52 - 94)  ABP: 96/74 (08-21-17 @ 10:00) (59/38 - 104/75)  ABP(mean): 83 (08-21-17 @ 10:00) (45 - 88)  RR: 21 (08-21-17 @ 10:00) (13 - 34)  SpO2: 93% (08-21-17 @ 10:00) (87% - 100%)    20 Aug 2017 07:01  -  21 Aug 2017 07:00  --------------------------------------------------------  IN: 2050 mL / OUT: 750 mL / NET: 1300 mL    21 Aug 2017 07:01  -  21 Aug 2017 11:40  --------------------------------------------------------  IN: 640 mL / OUT: 600 mL / NET: 40 mL    Appearance: No distress  HEENT:   mmm  Cardiovascular: s1s2 present, typical LVAD hum present with pulsatility  Respiratory: Lungs clear to auscultation	  Psychiatry: A & O x 3, Mood & affect appropriate  Gastrointestinal:  soft nt nd	  Skin: No cyanosis	  Neurologic: grossly non-focal  Extremities: no cyanosis  Vascular: radial A line present, radial pulse present    LVAD Interrogation: HeartMate II  Pump Flow: 4.7  Pump Speed: 9200  Pulse Index: 6.8  Pump Power: 5.3  VAD Events: none  Driveline evaluation:  intact  Programming Changes: No changes made    LABS:	 	  CBC Full  -  ( 21 Aug 2017 11:27 )  WBC Count : 10.6 K/uL  Hemoglobin : 7.9 g/dL  Hematocrit : 23.3 %  Platelet Count - Automated : 212 K/uL    08-21    138  |  108  |  74<H>  ----------------------------<  97  4.8   |  17<L>  |  1.88<H>    Ca    8.0<L>      21 Aug 2017 01:34    TPro  6.8  /  Alb  3.3  /  TBili  0.4  /  DBili  x   /  AST  30  /  ALT  23  /  AlkPhos  84  08-21    HgA1c: Hemoglobin A1C, Whole Blood: 5.5 % (06-07 @ 06:14)    ECG:  	  RADIOLOGY:  OTHER: 	< from: Enteroscopy (07.25.17 @ 12:08) >  Impression:          - Normal esophagus,stomach and duodenum.                       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 Treated with bipolar cautery.                       - No specimens collected.    < from: Enteroscopy (07.06.17 @ 11:08) >  Impression:         - Two non-bleeding angiodysplastic lesions in the jejunum. Treated with argon                        plasma coagulation (APC). Clips (MR conditional) were placed.                       - No specimens collected.    < from: Upper Endoscopy (07.03.17 @ 15:41) >  Impression:          - Normal esophagus.                       - Non-bleeding 5 mm gastric ulcer with no stigmata of bleeding. Improved                        since last EGD in 4/2017.                       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                       - No active bleeding noted on EGD. It is unlikely that the small gastric                        ulcer is source of patient's melena and anemia.    PREVIOUS DIAGNOSTIC TESTING:    [x] Echocardiogram: < from: Transthoracic Echocardiogram (06.26.17 @ 10:09) >  Dimensions:    Normal Values:  LA:     4.9    2.0 - 4.0 cm  Ao:     3.3    2.0 - 3.8 cm  SEPTUM: 0.8    0.6 - 1.2 cm  PWT:    0.9 0.6 - 1.1 cm  LVIDd:  4.7    3.0 - 5.6 cm  LVIDs:         1.8 - 4.0 cm  Derived variables:  LVMI: 70 g/m2  RWT: 0.38  EF (Visual Estimate): 20 %    s/p LVAD r/o Pericatdial effusion. 9200 rpm, MAP 73  1. The aortic valve is remains predominately closed with  every cycle.  Minimal aortic regurgitation.  2. Mildly dilated left atrium.  LA volume index = 37 cc/m2.  3. Severe segmental left ventricular systolic dysfunction.  LVAD cannula seen at the apex with laminar color flow seen  with velocities of less than 1m/sec.  4. Right ventricular enlargement with decreased right  ventricular systolic function. A device wire is noted in  the right heart.  5. Normal tricuspid valve. Moderate tricuspid  regurgitation.  6. Estimated pulmonary artery systolic pressure equals 39  mm Hg, assuming right atrial pressure equals 8 mm Hg,  consistent with borderline pulmonary pressures.  7. Normal pericardium with no pericardial effusion.

## 2017-08-22 LAB
ALBUMIN SERPL ELPH-MCNC: 3.4 G/DL — SIGNIFICANT CHANGE UP (ref 3.3–5)
ALP SERPL-CCNC: 93 U/L — SIGNIFICANT CHANGE UP (ref 40–120)
ALT FLD-CCNC: 32 U/L RC — SIGNIFICANT CHANGE UP (ref 10–45)
ANION GAP SERPL CALC-SCNC: 16 MMOL/L — SIGNIFICANT CHANGE UP (ref 5–17)
APTT BLD: 43.7 SEC — HIGH (ref 27.5–37.4)
AST SERPL-CCNC: 38 U/L — SIGNIFICANT CHANGE UP (ref 10–40)
BILIRUB SERPL-MCNC: 0.4 MG/DL — SIGNIFICANT CHANGE UP (ref 0.2–1.2)
BUN SERPL-MCNC: 57 MG/DL — HIGH (ref 7–23)
CALCIUM SERPL-MCNC: 8.4 MG/DL — SIGNIFICANT CHANGE UP (ref 8.4–10.5)
CHLORIDE SERPL-SCNC: 103 MMOL/L — SIGNIFICANT CHANGE UP (ref 96–108)
CO2 SERPL-SCNC: 17 MMOL/L — LOW (ref 22–31)
CREAT SERPL-MCNC: 1.22 MG/DL — SIGNIFICANT CHANGE UP (ref 0.5–1.3)
GAS PNL BLDA: SIGNIFICANT CHANGE UP
GLUCOSE SERPL-MCNC: 95 MG/DL — SIGNIFICANT CHANGE UP (ref 70–99)
HAPTOGLOB SERPL-MCNC: 119 MG/DL — SIGNIFICANT CHANGE UP (ref 34–200)
HCT VFR BLD CALC: 26.7 % — LOW (ref 39–50)
HGB BLD-MCNC: 9.6 G/DL — LOW (ref 13–17)
INR BLD: 3.71 RATIO — HIGH (ref 0.88–1.16)
LDH SERPL L TO P-CCNC: 215 U/L — SIGNIFICANT CHANGE UP (ref 50–242)
MCHC RBC-ENTMCNC: 31 PG — SIGNIFICANT CHANGE UP (ref 27–34)
MCHC RBC-ENTMCNC: 35.7 GM/DL — SIGNIFICANT CHANGE UP (ref 32–36)
MCV RBC AUTO: 86.9 FL — SIGNIFICANT CHANGE UP (ref 80–100)
PLATELET # BLD AUTO: 223 K/UL — SIGNIFICANT CHANGE UP (ref 150–400)
POTASSIUM SERPL-MCNC: 4.2 MMOL/L — SIGNIFICANT CHANGE UP (ref 3.5–5.3)
POTASSIUM SERPL-SCNC: 4.2 MMOL/L — SIGNIFICANT CHANGE UP (ref 3.5–5.3)
PROT SERPL-MCNC: 6.9 G/DL — SIGNIFICANT CHANGE UP (ref 6–8.3)
PROTHROM AB SERPL-ACNC: 41.1 SEC — HIGH (ref 9.8–12.7)
RBC # BLD: 3.08 M/UL — LOW (ref 4.2–5.8)
RBC # FLD: 15.5 % — HIGH (ref 10.3–14.5)
SODIUM SERPL-SCNC: 136 MMOL/L — SIGNIFICANT CHANGE UP (ref 135–145)
WBC # BLD: 12.5 K/UL — HIGH (ref 3.8–10.5)
WBC # FLD AUTO: 12.5 K/UL — HIGH (ref 3.8–10.5)

## 2017-08-22 PROCEDURE — 99291 CRITICAL CARE FIRST HOUR: CPT

## 2017-08-22 PROCEDURE — 90832 PSYTX W PT 30 MINUTES: CPT

## 2017-08-22 PROCEDURE — 99222 1ST HOSP IP/OBS MODERATE 55: CPT | Mod: GC

## 2017-08-22 PROCEDURE — 99233 SBSQ HOSP IP/OBS HIGH 50: CPT | Mod: GC

## 2017-08-22 RX ORDER — DEXTROSE MONOHYDRATE, SODIUM CHLORIDE, AND POTASSIUM CHLORIDE 50; .745; 4.5 G/1000ML; G/1000ML; G/1000ML
1000 INJECTION, SOLUTION INTRAVENOUS
Qty: 0 | Refills: 0 | Status: DISCONTINUED | OUTPATIENT
Start: 2017-08-22 | End: 2017-08-24

## 2017-08-22 RX ADMIN — Medication 1 MILLIGRAM(S): at 14:46

## 2017-08-22 RX ADMIN — AMIODARONE HYDROCHLORIDE 200 MILLIGRAM(S): 400 TABLET ORAL at 05:32

## 2017-08-22 RX ADMIN — DEXTROSE MONOHYDRATE, SODIUM CHLORIDE, AND POTASSIUM CHLORIDE 40 MILLILITER(S): 50; .745; 4.5 INJECTION, SOLUTION INTRAVENOUS at 01:12

## 2017-08-22 RX ADMIN — MEXILETINE HYDROCHLORIDE 150 MILLIGRAM(S): 150 CAPSULE ORAL at 05:32

## 2017-08-22 RX ADMIN — PANTOPRAZOLE SODIUM 40 MILLIGRAM(S): 20 TABLET, DELAYED RELEASE ORAL at 18:36

## 2017-08-22 RX ADMIN — Medication 500 MILLIGRAM(S): at 14:44

## 2017-08-22 RX ADMIN — Medication 325 MILLIGRAM(S): at 14:44

## 2017-08-22 RX ADMIN — MEXILETINE HYDROCHLORIDE 150 MILLIGRAM(S): 150 CAPSULE ORAL at 14:43

## 2017-08-22 RX ADMIN — Medication 100 MILLIGRAM(S): at 14:44

## 2017-08-22 RX ADMIN — MEXILETINE HYDROCHLORIDE 150 MILLIGRAM(S): 150 CAPSULE ORAL at 21:24

## 2017-08-22 RX ADMIN — PANTOPRAZOLE SODIUM 40 MILLIGRAM(S): 20 TABLET, DELAYED RELEASE ORAL at 05:32

## 2017-08-22 NOTE — CONSULT NOTE ADULT - ASSESSMENT
Impression:    1. Acute blood loss anemia from upper GI bleeding: ddx includes small bowel AVM vs. peptic ulcer disease. Given history of intestinal AVM and patient appears to not be actively bleeding given no further melena and appropriate H/H response to transfusion, will perform Video Capsule Endoscopy to assess for small bowel source and plan for EGD vs. enteroscopy tomorrow. Continue IV PPI BID and hold warfarin dosing.     2. Afib: will notify regarding reversal of a/c     3. NICM

## 2017-08-22 NOTE — DIETITIAN INITIAL EVALUATION ADULT. - PHYSICAL APPEARANCE
underweight/Nutrition physical focused exam: severe muscle wasting at temporals, clavicles, calfs.  Moderate muscle wasting at thigh and interosseous region. Moderate fast wasting at ribs.

## 2017-08-22 NOTE — DIETITIAN INITIAL EVALUATION ADULT. - NS AS NUTRI INTERV COLLABORAT
Collaboration with other providers/Malnutrition sticker placed in chart, team aware. RD remains available to monitor PO intake, wt, labs.

## 2017-08-22 NOTE — DIETITIAN INITIAL EVALUATION ADULT. - NUTRITION INTERVENTION
Collaboration and Referral of Nutrition Care/Meals and Snack/Medical Food Supplements/Feeding Assistance Nutrition Education

## 2017-08-22 NOTE — DIETITIAN INITIAL EVALUATION ADULT. - NS AS NUTRI INTERV ED CONTENT3
Reinforced HF nutrition therapy education. Reinforced coumadin and vit k drug interactions. Pt more concerned with nutritional status at this time and prefers education at a later date. RD remains available to follow up with education review.

## 2017-08-22 NOTE — CHART NOTE - NSCHARTNOTEFT_GEN_A_CORE
Upon Nutritional Assessment by the Registered Dietitian your patient was determined to meet criteria / has evidence of the following diagnosis/diagnoses:          [ ]  Mild Protein Calorie Malnutrition        [ X]  Moderate Protein Calorie Malnutrition        [ ] Severe Protein Calorie Malnutrition        [ ] Unspecified Protein Calorie Malnutrition        [ ] Underweight / BMI <19        [ ] Morbid Obesity / BMI > 40      Findings as based on:  [X ] Comprehensive nutrition assessment: Decline in PO intake  [X ] Nutrition Focused Physical Exam: Mod/severe fat and muscle wasting   [X ] Other: ? 175 Wt loss x1 month      Nutrition Plan/Recommendations:    When medically feasible, advance diet to Low Na and Ensure Enlive x2 daily       PROVIDER Section:     By signing this assessment you are acknowledging and agree with the diagnosis/diagnoses assigned by the Registered Dietitian    Comments:

## 2017-08-22 NOTE — DIETITIAN INITIAL EVALUATION ADULT. - ENERGY NEEDS
Ht: 5'8", Wt: 124.7lbs, BMI: 18.9kg/m2, IBW: 154lbs(+/-10%), 80%IBW  Pertinent information: Pt admitted for supratherpeutic INR, AMS, and decrease in PO intake. Per chart, Pt s/p LVAD 6/22/2017, readmitted for GIB 7/25. Pt now with blood loss anemia and NORMAN. currently undergoing capsule study.   No Edema, Skin intact

## 2017-08-22 NOTE — DIETITIAN INITIAL EVALUATION ADULT. - ORAL INTAKE PTA
fair/Pt reports a decline in PO intake PTA. States his god brother helps with cooking but isn't always around to assist with meals.  Breakfast: cereal, 2 eggs, banana. Lunch: Peanut butter and jelly sandwich. Dinner: watermellon, pasta and sauce or skips dinner.

## 2017-08-22 NOTE — CHART NOTE - NSCHARTNOTEFT_GEN_A_CORE
Video capsule endoscopy initiated at 1140.  Clear liquids at 1340.  Remove capsule at 1940  GI fellow to  in the am

## 2017-08-22 NOTE — DIETITIAN INITIAL EVALUATION ADULT. - NS AS NUTRI INTERV MEDICAL AND FOOD SUPPLEMENTS
When medically feasible, would recommend Ensure Enlive x2 to supplement PO intake./Commercial beverage

## 2017-08-22 NOTE — PROGRESS NOTE ADULT - SUBJECTIVE AND OBJECTIVE BOX
Heart Failure Progress Note    Interval History:  Pt s/p 3rd unit of PRBC's.  No further melena. Episode of hypotension overnight.    Medications:  mexiletine 150 milliGRAM(s) Oral every 8 hours  amiodarone    Tablet 200 milliGRAM(s) Oral daily  pantoprazole  Injectable 40 milliGRAM(s) IV Push every 12 hours  ferrous    sulfate 325 milliGRAM(s) Oral daily  folic acid 1 milliGRAM(s) Oral daily  ascorbic acid 500 milliGRAM(s) Oral daily  docusate sodium 100 milliGRAM(s) Oral three times a day  furosemide   Injectable 20 milliGRAM(s) IV Push daily  dextrose 5% + sodium chloride 0.9% with potassium chloride 40 mEq/L 1000 milliLiter(s) IV Continuous <Continuous>    Vitals:  T(C): 36 (17 @ 07:00), Max: 37.2 (17 @ 19:00)  HR: 93 (17 @ 13:00) (79 - 96)  ABP: 89/76 (17 @ 13:00) (68/53 - 128/94)  ABP(mean): 82 (17 @ 13:00) (61 - 109)  RR: 12 (17 @ 13:00) (11 - 38)  SpO2: 99% (17 @ 13:00) (83% - 100%)    Daily     Daily Weight in k.6 (22 Aug 2017 01:00)    Weight (kg): 58.4 ( @ 03:37)    I&O's Summary    21 Aug 2017 07:  -  22 Aug 2017 07:00  --------------------------------------------------------  IN: 1730 mL / OUT: 4025 mL / NET: -2295 mL    22 Aug 2017 07:  -  22 Aug 2017 13:51  --------------------------------------------------------  IN: 240 mL / OUT: 700 mL / NET: -460 mL        Physical Exam:  Appearance: No distress  HEENT:   mmm  Cardiovascular: s1s2 present, typical LVAD hum present with pulsatility  Respiratory: Lungs clear to auscultation	  Psychiatry: A & O x 3, Mood & affect appropriate  Gastrointestinal:  soft nt nd	  Skin: No cyanosis	  Neurologic: grossly non-focal  Extremities: no cyanosis    LVAD Interrogation: HeartMate II  Pump Flow: 3.5  Pump Speed: 9200  Pulse Index: 6.2  Pump Power: 4.7  VAD Events: one PI event  Driveline evaluation:  intact  Programming Changes: No changes made    Labs:                        9.6    12.5  )-----------( 223      ( 22 Aug 2017 01:38 )             26.7         136  |  103  |  57<H>  ----------------------------<  95  4.2   |  17<L>  |  1.22    Ca    8.4      22 Aug 2017 01:38    TPro  6.9  /  Alb  3.4  /  TBili  0.4  /  DBili  x   /  AST  38  /  ALT  32  /  AlkPhos  93      PT/INR - ( 22 Aug 2017 01:38 )   PT: 41.1 sec;   INR: 3.71 ratio       PTT - ( 22 Aug 2017 01:38 )  PTT:43.7 sec    Lactate Dehydrogenase, Serum: 215 U/L ( @ 01:38)  Lactate Dehydrogenase, Serum: 204 U/L ( @ 01:34)    TELEMETRY:     [ ] Echocardiogram:

## 2017-08-22 NOTE — CONSULT NOTE ADULT - ATTENDING COMMENTS
Agree with above.    Impression:    #1.  GI bleed/melena, history of small bowel angioectasias s/p prior push enteroscopy/endoscopic hemostasis.    #2.  Blood loss anemia    #3.  CHF s/p LVAD    #4.  Anticoagulated, INR > 3    Recommendations:    #1.  Allow INR to drift down, do not reverse unless life threatening bleeding.    #2.  Video capsule endoscopy to look for source of bleeding.

## 2017-08-22 NOTE — CONSULT NOTE ADULT - SUBJECTIVE AND OBJECTIVE BOX
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 Patient was brought in by a family member for decreased alertness. Per patient he noticed a black tarry BM on Saturday and more on  after arrival to the hospital. Denies NSAID use or abdominal pain. He does not take aspirin. He had an episode of NBNB emesis on  as well. The patient has not had any BM today. He denies chest pain. He had admission in July for GIB and was found to have a healing gastric ulcer and jejunal AVMs. The AVM were treated with APC and patient has been feeling well since then. He recieved 3 units of RBC since arriving to the hospital.    Allergies:  No Known Allergies      Home Medications:    Hospital Medications:  mexiletine 150 milliGRAM(s) Oral every 8 hours  amiodarone    Tablet 200 milliGRAM(s) Oral daily  pantoprazole  Injectable 40 milliGRAM(s) IV Push every 12 hours  ferrous    sulfate 325 milliGRAM(s) Oral daily  folic acid 1 milliGRAM(s) Oral daily  ascorbic acid 500 milliGRAM(s) Oral daily  docusate sodium 100 milliGRAM(s) Oral three times a day  furosemide   Injectable 20 milliGRAM(s) IV Push daily  dextrose 5% + sodium chloride 0.9% with potassium chloride 40 mEq/L 1000 milliLiter(s) IV Continuous <Continuous>      PMHX/PSHX:  GIB (gastrointestinal bleeding)  H/O prior ablation treatment  Ventricular fibrillation  PAF (paroxysmal atrial fibrillation)  Non-Ischemic Cardiomyopathy  SVT (Supraventricular Tachycardia)  HTN  CHF (Congestive Heart Failure)  LVAD (left ventricular assist device) present  Status post left hip replacement  Hypertension  Hypertension  History of Prior Ablation Treatment  AICD (Automatic Cardioverter/Defibrillator) Present      Family history:  No pertinent family history in first degree relatives      Social History:     ROS:     General:  No wt loss, fevers, chills, night sweats, fatigue,   Eyes:  Good vision, no reported pain  ENT:  No sore throat, pain, runny nose, dysphagia  CV:  No pain, palpitations, hypo/hypertension  Resp:  No dyspnea, cough, tachypnea, wheezing  GI:  See HPI  :  No pain, bleeding, incontinence, nocturia  Muscle:  No pain, weakness  Neuro:  No weakness, tingling, memory problems  Psych:  No fatigue, insomnia, mood problems, depression  Endocrine:  No polyuria, polydipsia, cold/heat intolerance  Heme:  No petechiae, ecchymosis, easy bruisability  Skin:  No rash, edema      PHYSICAL EXAM:     GENERAL:  Appears stated age, well-groomed, well-nourished, no distress  HEENT:  NC/AT,  conjunctivae clear and pink,  no JVD  CHEST:  Full & symmetric excursion, no increased effort, breath sounds clear  HEART:  Regular rhythm, S1, S2, no murmur/rub/S3/S4, no abdominal bruit, no edema  ABDOMEN:  Soft, non-tender, non-distended, normoactive bowel sounds,  no masses , no hepatosplenomegaly  EXTREMITIES:  no cyanosis,clubbing or edema  SKIN:  No rash/erythema/ecchymoses/petechiae/wounds/abscess/warm/dry  NEURO:  Alert, oriented  RECTAL: Melenic stool in the vault    Vital Signs:  Vital Signs Last 24 Hrs  T(C): 36 (22 Aug 2017 07:00), Max: 37.2 (21 Aug 2017 19:00)  T(F): 96.8 (22 Aug 2017 07:00), Max: 98.9 (21 Aug 2017 19:00)  HR: 89 (22 Aug 2017 10:00) (72 - 96)  BP: --  BP(mean): --  RR: 11 (22 Aug 2017 10:00) (11 - 38)  SpO2: 100% (22 Aug 2017 10:00) (83% - 100%)  Daily     Daily Weight in k.6 (22 Aug 2017 01:00)    LABS:                        9.6    12.5  )-----------( 223      ( 22 Aug 2017 01:38 )             26.7     08-22    136  |  103  |  57<H>  ----------------------------<  95  4.2   |  17<L>  |  1.22    Ca    8.4      22 Aug 2017 01:38    TPro  6.9  /  Alb  3.4  /  TBili  0.4  /  DBili  x   /  AST  38  /  ALT  32  /  AlkPhos  93  08-    LIVER FUNCTIONS - ( 22 Aug 2017 01:38 )  Alb: 3.4 g/dL / Pro: 6.9 g/dL / ALK PHOS: 93 U/L / ALT: 32 U/L RC / AST: 38 U/L / GGT: x           PT/INR - ( 22 Aug 2017 01:38 )   PT: 41.1 sec;   INR: 3.71 ratio         PTT - ( 22 Aug 2017 01:38 )  PTT:43.7 sec

## 2017-08-22 NOTE — PROGRESS NOTE ADULT - SUBJECTIVE AND OBJECTIVE BOX
67 year old man with chronic systolic heart failure, ACC/AHA stage D, due to nonischemic cardiomyopathy, s/p LVAD 6/12/17, GIB s/p cautery 7/25/17 now presents to [facility] ED with decreased PO intake and altered mental status as per brother, Nawaf, who accompanies him, since Saturday 8/19/17. The patient went to the LVAD clinic for an appt on Friday, 8/18 where he seemed slightly lethargic for which they gave him food and water which seemed to help and he went home. Nawaf reported that the patient is normally very energetic and talkative and has a good appetite, but since Saturday has decreased PO intake and been much more lethargic than usual as well as having one episode of melena. The patient had 1 episode of emesis on Sunday night, 8/20/17 around 9pm. The patient was found to be guaiac + in the ED. Denies any recent fevers, chills, diarrhea, recent travel, pain at the LVAD driveline site. (21 Aug 2017 02:06)    Patient is currently in the CTU for further evaluation and treatment.  Secured IV lines and arterial line remains in place placed. IVF ordered as patient was cool and clammy, pale conjunctiva with slowed mentation and response time. Hct noted to be 18 on lab values. 2 units PRBC's ordered. INR 4.1. LVAD parameters (PI, flow) seem to be stable based on values noted in clinic Friday. Patient improved with hydration. Coumadin to be held. Now that patient is more alert, he reports a limited diet and in part appears to be dehydrated. Lactate normal. Hydration ordered.    Patient requires continuous monitoring with EKG, arterial line, pulse oximetry, as well as VAD parameters. Plan for follow-up of capsule study following serial H&H's, and blood transfusiosn as necessary.      Care plan discussed with ICU care team and heart failure team. I have spent 30 minutes providing non continuous critical care management to the patient.

## 2017-08-22 NOTE — DIETITIAN INITIAL EVALUATION ADULT. - PERTINENT LABORATORY DATA
Hgb/Hct:9.6/26.7-low, Na:136, K:4.2, Cl:103, BUN:57-high, Cr:1.22, Glucose:95, Ca:8.4, Total Protein:6.9, Albumin:3.4, Total Bilirubin:0.4,  AlkPhos:93, AST:38, ALT:32,

## 2017-08-22 NOTE — DIETITIAN INITIAL EVALUATION ADULT. - PROBLEM SELECTOR PLAN 1
Admit to CTU. 2 PRBCs ordered. Monitor H/H, melena. Hold Coumadin. Continue ferrous sulfate, folic acid, ascorbic acid. NS 500cc for dehydration.

## 2017-08-22 NOTE — DIETITIAN INITIAL EVALUATION ADULT. - NS FNS REASON FOR WEIGHT CHANG
decreased po intake/Per previous RD note Pt was admitted at 151.3lbs which was an increase for him. Pt states at home during his daily Wt's he has been between 147-149lbs. Pt now admitted at 124.7lbs. Pt states he feels as he has lost Weight but didn't think it was that severe. Pt's Wt's currently beiing taken via bed scale Wt, will continue to monitor.

## 2017-08-22 NOTE — DIETITIAN INITIAL EVALUATION ADULT. - SIGNS/SYMPTOMS
Moderate/severe fat and muscle wasting, ? 17% Wt loss x1 month, Diet recall Pt with limited ability to use coumadin/vitamin teach back points

## 2017-08-22 NOTE — PROGRESS NOTE ADULT - SUBJECTIVE AND OBJECTIVE BOX
Behavioral Cardiology Progress Note     HPI:  66 y/o male with advanced heart failure (NICM); s/p LVAD implant (6/12/17), course complicated by GIB found to be 2/2 AVMs s/p APC with push enteroscopy (7/6/17), discharged to [facility] (7/17/17); admitted from rehab with c/o melena x 3 days with suspicion of another GIB, s/p cautery (7/25/17); presented to [facility] ED with decreased PO intake, vomiting, melena, and AMS.  Found to be hypotensive, admitted to CTU for further management; found to be anemic, transfused 3U PRBC’s, seen by GI today with plan for Video Capsule Endoscopy to assess for small bowel source and plan for EGD vs. enteroscopy tomorrow.      67 year old man with chronic systolic heart failure, ACC/AHA stage D, due to nonischemic cardiomyopathy, s/p LVAD 6/12/17, GIB now presents to [facility] ED with decreased PO intake and altered mental status as per brother, Nawaf, who accompanies him, since Saturday 8/19/17. The patient went to the LVAD clinic for an appt on Friday, 8/18 where he seemed slightly lethargic for which they gave him food and water which seemed to help and he went home. Nawaf reported that the patient is normally very energetic and talkative and has a good appetite, but since Saturday has has decreased PO intake and been much more lethargic than usual as well as having one episode of melena. The patient had 1 episode of emesis on Sunday night, 8/20/17 around 9pm. The patient was found to be guaiac + in the ED. Denies any recent fevers, chills, diarrhea, recent travel, pain at the LVAD driveline site.  Behavioral Health Assessment:     Mood:  "feeling good today."         Current stressors:   Readmission to hospital   Adjustment to living with LVAD        Support system/family support: Good support from family and close friend      Coping strategies: Talking with family and staff, watching TV; thinking about his granddaughter     Understanding of medical illness and treatment plan:  Good understanding of LVAD management; will benefit from ongoing education.      MSE: Pt seen resting in bed.  A&Ox3.  Well related with good eye contact.  Thought process goal directed.  No abnormal thought content; denies s/i.  Mood upbeat.  Affect full range.

## 2017-08-22 NOTE — DIETITIAN INITIAL EVALUATION ADULT. - ADHERENCE
good/Pt states he avoids salt and vegetables high in vitamin K but then states he feels there is nothing left for him to eat.

## 2017-08-22 NOTE — DIETITIAN INITIAL EVALUATION ADULT. - OTHER INFO
Pt seen for LOS in CTU. Pt reports being hungry but currently on clear liquids for capsule study. Pt states he has been monitoring his weights daily while someone holds his batteries. Pt states the Wt has hope 147-149lbs. ? accuracy of Wt's obtained by Pt vs in house vs physical wt loss.  Pt willing to try Ensure Enlive x2 daily when medically feasible. Pt was amenable to brief HF and coumadin education; although Ac currently held. Pt denies GI distress at this time, states emesis x1 PTA. Pt denies chewing/swallowing difficulty. Pt takes Vit C, folic acid. NKFA

## 2017-08-22 NOTE — PROGRESS NOTE ADULT - ASSESSMENT
Slept well last night, sat in chair earlier today.   Feeling less tired.   Mood upbeat.   Was seen by GI today, pt understands plan for further GI testing.  Remains NPO.   Reviewed simple checklist with patient designed to assist with LVAD management at home.   Handout utilizes pictures and simple instructions to accommodate patient’s limited reading skills.   Team has been utilizing this method of teaching since pre-LVAD implant with good results.  Pt able to teach back the information accurately.   Has good support at home from his 2 brothers (Nawaf and Bladimir) who prepare meals, and his close friend Tahira who assists with LVAD management.         DX:  Systolic heart failure; r/o Adjustment disorder with anxiety/depression     Recommendations:   Behavioral Cardiology will continue to follow   Review LVAD management with teach back.

## 2017-08-22 NOTE — PROGRESS NOTE ADULT - ASSESSMENT
67M NICM EF 20%, s/p LVAD 6/12/2017, hx of GIB/AVM's who presents with symptomatic blood loss anemia, currently hemodynamically stable with stable MAP's and improved symptoms.    #LVAD present - no events. presented with continued supratherapeutic INR  -hold further AC for now given ongoing blood loss/ f/u INR  -MAP goal 70-80. Systolic BP 110s. patient with systolic 110s. goal 100-120s. would stop  IVF at this time.  -hold carvedilol 6.25mg BID  -hold PO furosemide 20mg    #NORMAN - likely hypovolemia/hypotension. Cr continues to improve  -hold spironolactone 25mg, lisinopril 2.5mg daily    #blood loss anemia - awaiting further evaluation  -NPO as per GI. s/p pill endoscopy  -f/u H/H - currently stable

## 2017-08-23 LAB
ALBUMIN SERPL ELPH-MCNC: 3.3 G/DL — SIGNIFICANT CHANGE UP (ref 3.3–5)
ALP SERPL-CCNC: 89 U/L — SIGNIFICANT CHANGE UP (ref 40–120)
ALT FLD-CCNC: 35 U/L RC — SIGNIFICANT CHANGE UP (ref 10–45)
ANION GAP SERPL CALC-SCNC: 12 MMOL/L — SIGNIFICANT CHANGE UP (ref 5–17)
APTT BLD: 26.2 SEC — LOW (ref 27.5–37.4)
AST SERPL-CCNC: 40 U/L — SIGNIFICANT CHANGE UP (ref 10–40)
BASOPHILS # BLD AUTO: 0 K/UL — SIGNIFICANT CHANGE UP (ref 0–0.2)
BASOPHILS NFR BLD AUTO: 0.1 % — SIGNIFICANT CHANGE UP (ref 0–2)
BILIRUB SERPL-MCNC: 0.4 MG/DL — SIGNIFICANT CHANGE UP (ref 0.2–1.2)
BUN SERPL-MCNC: 40 MG/DL — HIGH (ref 7–23)
CALCIUM SERPL-MCNC: 8.8 MG/DL — SIGNIFICANT CHANGE UP (ref 8.4–10.5)
CHLORIDE SERPL-SCNC: 104 MMOL/L — SIGNIFICANT CHANGE UP (ref 96–108)
CO2 SERPL-SCNC: 17 MMOL/L — LOW (ref 22–31)
CREAT SERPL-MCNC: 1.01 MG/DL — SIGNIFICANT CHANGE UP (ref 0.5–1.3)
EOSINOPHIL # BLD AUTO: 0.2 K/UL — SIGNIFICANT CHANGE UP (ref 0–0.5)
EOSINOPHIL NFR BLD AUTO: 1.5 % — SIGNIFICANT CHANGE UP (ref 0–6)
GAS PNL BLDA: SIGNIFICANT CHANGE UP
GLUCOSE SERPL-MCNC: 96 MG/DL — SIGNIFICANT CHANGE UP (ref 70–99)
HCT VFR BLD CALC: 26.2 % — LOW (ref 39–50)
HCT VFR BLD CALC: 27.5 % — LOW (ref 39–50)
HGB BLD-MCNC: 9 G/DL — LOW (ref 13–17)
HGB BLD-MCNC: 9.4 G/DL — LOW (ref 13–17)
INR BLD: 2.56 RATIO — HIGH (ref 0.88–1.16)
INR BLD: 2.85 RATIO — HIGH (ref 0.88–1.16)
LYMPHOCYTES # BLD AUTO: 1.6 K/UL — SIGNIFICANT CHANGE UP (ref 1–3.3)
LYMPHOCYTES # BLD AUTO: 12.8 % — LOW (ref 13–44)
MCHC RBC-ENTMCNC: 30.1 PG — SIGNIFICANT CHANGE UP (ref 27–34)
MCHC RBC-ENTMCNC: 30.4 PG — SIGNIFICANT CHANGE UP (ref 27–34)
MCHC RBC-ENTMCNC: 34.1 GM/DL — SIGNIFICANT CHANGE UP (ref 32–36)
MCHC RBC-ENTMCNC: 34.2 GM/DL — SIGNIFICANT CHANGE UP (ref 32–36)
MCV RBC AUTO: 88.2 FL — SIGNIFICANT CHANGE UP (ref 80–100)
MCV RBC AUTO: 89 FL — SIGNIFICANT CHANGE UP (ref 80–100)
MONOCYTES # BLD AUTO: 1.6 K/UL — HIGH (ref 0–0.9)
MONOCYTES NFR BLD AUTO: 12.6 % — SIGNIFICANT CHANGE UP (ref 2–14)
NEUTROPHILS # BLD AUTO: 9.2 K/UL — HIGH (ref 1.8–7.4)
NEUTROPHILS NFR BLD AUTO: 73.1 % — SIGNIFICANT CHANGE UP (ref 43–77)
PLATELET # BLD AUTO: 214 K/UL — SIGNIFICANT CHANGE UP (ref 150–400)
PLATELET # BLD AUTO: 266 K/UL — SIGNIFICANT CHANGE UP (ref 150–400)
POTASSIUM SERPL-MCNC: 4.6 MMOL/L — SIGNIFICANT CHANGE UP (ref 3.5–5.3)
POTASSIUM SERPL-SCNC: 4.6 MMOL/L — SIGNIFICANT CHANGE UP (ref 3.5–5.3)
PROT SERPL-MCNC: 6.6 G/DL — SIGNIFICANT CHANGE UP (ref 6–8.3)
PROTHROM AB SERPL-ACNC: 28.4 SEC — HIGH (ref 9.8–12.7)
PROTHROM AB SERPL-ACNC: 31.5 SEC — HIGH (ref 9.8–12.7)
RBC # BLD: 2.94 M/UL — LOW (ref 4.2–5.8)
RBC # BLD: 3.12 M/UL — LOW (ref 4.2–5.8)
RBC # FLD: 16.1 % — HIGH (ref 10.3–14.5)
RBC # FLD: 16.3 % — HIGH (ref 10.3–14.5)
SODIUM SERPL-SCNC: 133 MMOL/L — LOW (ref 135–145)
WBC # BLD: 12.6 K/UL — HIGH (ref 3.8–10.5)
WBC # BLD: 23 K/UL — HIGH (ref 3.8–10.5)
WBC # FLD AUTO: 12.6 K/UL — HIGH (ref 3.8–10.5)
WBC # FLD AUTO: 23 K/UL — HIGH (ref 3.8–10.5)

## 2017-08-23 PROCEDURE — 44366 SMALL BOWEL ENDOSCOPY: CPT

## 2017-08-23 PROCEDURE — 99233 SBSQ HOSP IP/OBS HIGH 50: CPT | Mod: 25,GC

## 2017-08-23 PROCEDURE — 93750 INTERROGATION VAD IN PERSON: CPT

## 2017-08-23 PROCEDURE — 71010: CPT | Mod: 26

## 2017-08-23 RX ORDER — FUROSEMIDE 40 MG
20 TABLET ORAL DAILY
Qty: 0 | Refills: 0 | Status: DISCONTINUED | OUTPATIENT
Start: 2017-08-24 | End: 2017-08-25

## 2017-08-23 RX ADMIN — MEXILETINE HYDROCHLORIDE 150 MILLIGRAM(S): 150 CAPSULE ORAL at 05:28

## 2017-08-23 RX ADMIN — Medication 100 MILLIGRAM(S): at 16:13

## 2017-08-23 RX ADMIN — Medication 325 MILLIGRAM(S): at 16:14

## 2017-08-23 RX ADMIN — Medication 20 MILLIGRAM(S): at 05:28

## 2017-08-23 RX ADMIN — Medication 500 MILLIGRAM(S): at 16:13

## 2017-08-23 RX ADMIN — AMIODARONE HYDROCHLORIDE 200 MILLIGRAM(S): 400 TABLET ORAL at 05:31

## 2017-08-23 RX ADMIN — Medication 100 MILLIGRAM(S): at 05:28

## 2017-08-23 RX ADMIN — MEXILETINE HYDROCHLORIDE 150 MILLIGRAM(S): 150 CAPSULE ORAL at 21:03

## 2017-08-23 RX ADMIN — MEXILETINE HYDROCHLORIDE 150 MILLIGRAM(S): 150 CAPSULE ORAL at 16:12

## 2017-08-23 RX ADMIN — PANTOPRAZOLE SODIUM 40 MILLIGRAM(S): 20 TABLET, DELAYED RELEASE ORAL at 05:29

## 2017-08-23 RX ADMIN — Medication 1 MILLIGRAM(S): at 16:13

## 2017-08-23 RX ADMIN — Medication 100 MILLIGRAM(S): at 21:03

## 2017-08-23 NOTE — PROGRESS NOTE ADULT - SUBJECTIVE AND OBJECTIVE BOX
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, presented with confusion and Tarry stools, acute blood loss anemia    Sedation by Anesthesia [x]    PAST MEDICAL & SURGICAL HISTORY:  GIB (gastrointestinal bleeding)  Jejunal AVM's  Gastric Ulcer  Ventricular fibrillation: s/p AICD  PAF (paroxysmal atrial fibrillation): on xarelto  Non-Ischemic Cardiomyopathy  SVT (Supraventricular Tachycardia)  HTN  CHF (Congestive Heart Failure)  LVAD (left ventricular assist device) present  Status post left hip replacement  History of Prior Ablation Treatment: for Afib  AICD (Automatic Cardioverter/Defibrillator) Present: [employer] with 1 [employer] lead09- explanted and replaced with Justyletronic 2 leads on 09      PMH of Gastroparesis [ ]  Gastric Surgery [ ]  Gastric Outlet Obstruction [ ] none    Allergies:    No Known Allergies    Intolerances: none    Latex allergy: [ ] yes [x] no    Medications:MEDICATIONS  (STANDING):  mexiletine 150 milliGRAM(s) Oral every 8 hours  amiodarone    Tablet 200 milliGRAM(s) Oral daily  pantoprazole  Injectable 40 milliGRAM(s) IV Push every 12 hours  ferrous    sulfate 325 milliGRAM(s) Oral daily  folic acid 1 milliGRAM(s) Oral daily  ascorbic acid 500 milliGRAM(s) Oral daily  docusate sodium 100 milliGRAM(s) Oral three times a day  dextrose 5% + sodium chloride 0.9% with potassium chloride 40 mEq/L 1000 milliLiter(s) (40 mL/Hr) IV Continuous <Continuous>    MEDICATIONS  (PRN):      Smoking: [ ] yes  [x] no    AICD/PPM: [x] yes   [ ] no    Pertinent lab data:                        9.0    12.6  )-----------( 214      ( 23 Aug 2017 01:55 )             26.2     08-23    133<L>  |  104  |  40<H>  ----------------------------<  96  4.6   |  17<L>  |  1.01    Ca    8.8      23 Aug 2017 01:55    TPro  6.6  /  Alb  3.3  /  TBili  0.4  /  DBili  x   /  AST  40  /  ALT  35  /  AlkPhos  89  08-23    PT/INR - ( 23 Aug 2017 01:55 )   PT: 28.4 sec;   INR: 2.56 ratio         PTT - ( 23 Aug 2017 01:55 )  PTT:26.2 sec    < from: Transthoracic Echocardiogram (17 @ 16:09) >  Patient name: BILLY RUSSELL  YOB: 1950   Age: 67 (M)   MR#: [id number]  Study Date: 2017  [id number]  Study quality: Technically difficult  Referring Physician: Ayaz Barr MD  BloodPressure: 90 mmHg  Height: 173 cm  Weight: 75 kg  BSA: 1.9 m2  ------------------------------------------------------------------------  PROCEDURE: Transthoracic echocardiogram with 2-D, M-Mode  and complete spectral and color flow Doppler.  INDICATION: Heart failure, unspecified (I50.9)  ------------------------------------------------------------------------  Dimensions:    Normal Values:  LA:            2.0 - 4.0 cm  Ao:            2.0 - 3.8 cm  SEPTUM: 0.8    0.6 - 1.2 cm  PWT:    0.8    0.6 - 1.1 cm  LVIDd:  6.3    3.0 - 5.6 cm  LVIDs:         1.8 - 4.0 cm  Derived variables:  LVMI: 108 g/m2  RWT: 0.25  ------------------------------------------------------------------------  Observations:  Mitral Valve: Tethered mitral valve leaflets with normal  opening. Mild mitral regurgitation.  Aortic Valve/Aorta: Aortic valve not well visualized;  appears trileaflet. The valve opens with some heart beats.  Minimal aortic regurgitation.  Left Atrium: Mild left atrial enlargement.  Left Ventricle: Endocardium not well visualized; severe  left ventricular systolic dysfunction. Abnormal motion of  the interventricular septum. LVAD inflow cannula present in  the apex is not well visualized. LVIDd=6.3 cm.  Right Heart: Mild right atrial enlargement. The right  ventricle is not well visualized; right ventricle appears  mildly enlarged with decreased right ventricular systolic  function. A device wire is noted in the right heart.  Tricuspid valve not well visualized, probably normal. Mild  tricuspid regurgitation. Normal pulmonic valve.  Pericardium/Pleura: Normal pericardium with no pericardial  effusion.  Hemodynamic: Estimated right atrial pressure is 8 mm Hg.  Estimated right ventricular systolic pressure equals 28 mm  Hg, assuming right atrial pressure equals 8 mm Hg,  consistent with normal pulmonary pressures.  ------------------------------------------------------------------------  Conclusions:  LVAD output 9200 rpm. Limited study performed at the  bedside with heart failure team present. No changes made to  LVAD settings.  1. Aortic valve not well visualized; appears trileaflet.  The valve opens with some heart beats. Minimal aortic  regurgitation.  2. LVIDd=6.3 cm.  3. Endocardium not well visualized; severe left ventricular  systolic dysfunction. Abnormal motion of the  interventricular septum. LVAD inflow cannula present in the  apex is not well visualized.  4. The right ventricle is not well visualized; right  ventricle appears mildly enlarged with decreased right  ventricular systolic function. A device wire is noted in  the right heart.      Physical Examination:  Daily     Daily Weight in k.1 (23 Aug 2017 00:00)  Vital Signs Last 24 Hrs  T(C): 36.3 (23 Aug 2017 11:00), Max: 37 (22 Aug 2017 19:00)  T(F): 97.3 (23 Aug 2017 11:00), Max: 98.6 (22 Aug 2017 19:00)  HR: 85 (23 Aug 2017 12:00) (74 - 109)  BP: --  BP(mean): --  RR: 28 (23 Aug 2017 12:00) (9 - 32)  SpO2: 100% (23 Aug 2017 12:00) (42% - 100%)      Constitutional: NAD    HEENT: PERRLA, EOMI,       Neck:  No JVD    Respiratory: CTAB/L    Cardiovascular: S1 and S2    Gastrointestinal: BS+, soft, NT/ND    Extremities: No peripheral edema      Comments:    ASA Class: I [ ]  II [ ]  III [ ]  IV [x]    The patient is a suitable candidate for the planned procedure unless box checked [ ]  No, explain:

## 2017-08-23 NOTE — PROGRESS NOTE ADULT - SUBJECTIVE AND OBJECTIVE BOX
Heart Failure Progress Note  Fellow Pager [phone]  After 5:00PM or weekends call Cardiology On-Call [facility] [phone]    Interval History:  Pt feels well. Awaiting further evaluation.    Medications:  mexiletine 150 milliGRAM(s) Oral every 8 hours  amiodarone    Tablet 200 milliGRAM(s) Oral daily  pantoprazole  Injectable 40 milliGRAM(s) IV Push every 12 hours  ferrous    sulfate 325 milliGRAM(s) Oral daily  folic acid 1 milliGRAM(s) Oral daily  ascorbic acid 500 milliGRAM(s) Oral daily  docusate sodium 100 milliGRAM(s) Oral three times a day  dextrose 5% + sodium chloride 0.9% with potassium chloride 40 mEq/L 1000 milliLiter(s) IV Continuous <Continuous>    Vitals:  T(C): 36.3 (17 @ 11:00), Max: 37 (17 @ 19:00)  HR: 85 (17 @ 12:00) (74 - 109)  ABP: 100/79 (17 @ 12:00) (80/53 - 128/100)  ABP(mean): 89 (17 @ 12:00) (62 - 96)  RR: 28 (17 @ 12:00) (9 - 32)  SpO2: 100% (17 @ 12:00) (42% - 100%)    Daily     Daily Weight in k.1 (23 Aug 2017 00:00)    I&O's Summary    22 Aug 2017 07:  -  23 Aug 2017 07:00  --------------------------------------------------------  IN: 920 mL / OUT: 1200 mL / NET: -280 mL    23 Aug 2017 07:  -  23 Aug 2017 13:43  --------------------------------------------------------  IN: 200 mL / OUT: 700 mL / NET: -500 mL    Physical Exam:  Appearance: No distress  HEENT:   mmm  Cardiovascular: s1s2 present, typical LVAD hum present with pulsatility  Respiratory: Lungs clear to auscultation	  Psychiatry: A & O x 3, Mood & affect appropriate  Gastrointestinal:  soft nt nd	  Skin: No cyanosis	  Neurologic: grossly non-focal  Extremities: no cyanosis    LVAD Interrogation: HeartMate II  Pump Flow: 3.1  Pump Speed: 9200  Pulse Index: 4.8  Pump Power: 4.5  VAD Events: PI events  Driveline evaluation:  intact  Programming Changes: No changes made    Labs:                        9.0    12.6  )-----------( 214      ( 23 Aug 2017 01:55 )             26.2         133<L>  |  104  |  40<H>  ----------------------------<  96  4.6   |  17<L>  |  1.01    Ca    8.8      23 Aug 2017 01:55    TPro  6.6  /  Alb  3.3  /  TBili  0.4  /  DBili  x   /  AST  40  /  ALT  35  /  AlkPhos  89      PT/INR - ( 23 Aug 2017 01:55 )   PT: 28.4 sec;   INR: 2.56 ratio      PTT - ( 23 Aug 2017 01:55 )  PTT:26.2 sec    Lactate Dehydrogenase, Serum: 215 U/L ( @ 01:38)  Lactate Dehydrogenase, Serum: 204 U/L ( @ 01:34)    TELEMETRY: no events

## 2017-08-23 NOTE — PROGRESS NOTE ADULT - ASSESSMENT
67M NICM EF 20%, s/p LVAD 6/12/2017, hx of GIB/AVM's who presents with symptomatic blood loss anemia, currently hemodynamically stable with stable MAP's and improved symptoms.    #LVAD present - PI events. presented with continued supratherapeutic INR  -hold further AC for now given ongoing blood loss/ f/u INR  -MAP goal 70-80. Systolic BP 110s.  goal 100-120s. on gentle IVF for maintenance.  -hold carvedilol 6.25mg BID  -resume PO furosemide 20mg daily    #NORMAN - likely hypovolemia/hypotension. Cr continues to improve  -hold spironolactone 25mg, lisinopril 2.5mg daily until after GI eval    #blood loss anemia - awaiting further evaluation  -awaiting endoscopy  -f/u H/H - currently stable

## 2017-08-23 NOTE — PROCEDURE NOTE - ADDITIONAL PROCEDURE DETAILS
1. Battery longevity 3.8 years  2. Measured data WNL  3. Sensing and pacing thresholds WNL  4. Tachy and maggi parameters unchanged, see print out  5. Not pacemaker dependent, Vpaced <1%

## 2017-08-24 DIAGNOSIS — K92.2 GASTROINTESTINAL HEMORRHAGE, UNSPECIFIED: ICD-10-CM

## 2017-08-24 LAB
ALBUMIN SERPL ELPH-MCNC: 3.3 G/DL — SIGNIFICANT CHANGE UP (ref 3.3–5)
ALP SERPL-CCNC: 103 U/L — SIGNIFICANT CHANGE UP (ref 40–120)
ALT FLD-CCNC: 33 U/L RC — SIGNIFICANT CHANGE UP (ref 10–45)
ANION GAP SERPL CALC-SCNC: 12 MMOL/L — SIGNIFICANT CHANGE UP (ref 5–17)
APTT BLD: 28.4 SEC — SIGNIFICANT CHANGE UP (ref 27.5–37.4)
APTT BLD: 37.2 SEC — SIGNIFICANT CHANGE UP (ref 27.5–37.4)
AST SERPL-CCNC: 36 U/L — SIGNIFICANT CHANGE UP (ref 10–40)
BILIRUB SERPL-MCNC: 0.5 MG/DL — SIGNIFICANT CHANGE UP (ref 0.2–1.2)
BUN SERPL-MCNC: 26 MG/DL — HIGH (ref 7–23)
CALCIUM SERPL-MCNC: 8.8 MG/DL — SIGNIFICANT CHANGE UP (ref 8.4–10.5)
CHLORIDE SERPL-SCNC: 106 MMOL/L — SIGNIFICANT CHANGE UP (ref 96–108)
CO2 SERPL-SCNC: 16 MMOL/L — LOW (ref 22–31)
CREAT SERPL-MCNC: 1.02 MG/DL — SIGNIFICANT CHANGE UP (ref 0.5–1.3)
GAS PNL BLDA: SIGNIFICANT CHANGE UP
GLUCOSE SERPL-MCNC: 93 MG/DL — SIGNIFICANT CHANGE UP (ref 70–99)
HCT VFR BLD CALC: 25.8 % — LOW (ref 39–50)
HCT VFR BLD CALC: 27.3 % — LOW (ref 39–50)
HGB BLD-MCNC: 8.7 G/DL — LOW (ref 13–17)
HGB BLD-MCNC: 9.1 G/DL — LOW (ref 13–17)
INR BLD: 1.14 RATIO — SIGNIFICANT CHANGE UP (ref 0.88–1.16)
INR BLD: 2.41 RATIO — HIGH (ref 0.88–1.16)
INR BLD: 2.83 RATIO — HIGH (ref 0.88–1.16)
MCHC RBC-ENTMCNC: 29.8 PG — SIGNIFICANT CHANGE UP (ref 27–34)
MCHC RBC-ENTMCNC: 30.4 PG — SIGNIFICANT CHANGE UP (ref 27–34)
MCHC RBC-ENTMCNC: 33.4 GM/DL — SIGNIFICANT CHANGE UP (ref 32–36)
MCHC RBC-ENTMCNC: 33.8 GM/DL — SIGNIFICANT CHANGE UP (ref 32–36)
MCV RBC AUTO: 89.3 FL — SIGNIFICANT CHANGE UP (ref 80–100)
MCV RBC AUTO: 89.9 FL — SIGNIFICANT CHANGE UP (ref 80–100)
PHOSPHATE SERPL-MCNC: 4.1 MG/DL — SIGNIFICANT CHANGE UP (ref 2.5–4.5)
PLATELET # BLD AUTO: 259 K/UL — SIGNIFICANT CHANGE UP (ref 150–400)
PLATELET # BLD AUTO: 279 K/UL — SIGNIFICANT CHANGE UP (ref 150–400)
POTASSIUM SERPL-MCNC: 4.5 MMOL/L — SIGNIFICANT CHANGE UP (ref 3.5–5.3)
POTASSIUM SERPL-SCNC: 4.5 MMOL/L — SIGNIFICANT CHANGE UP (ref 3.5–5.3)
PROT SERPL-MCNC: 6.8 G/DL — SIGNIFICANT CHANGE UP (ref 6–8.3)
PROTHROM AB SERPL-ACNC: 12.3 SEC — SIGNIFICANT CHANGE UP (ref 9.8–12.7)
PROTHROM AB SERPL-ACNC: 27.7 SEC — HIGH (ref 10–13.1)
PROTHROM AB SERPL-ACNC: 31.2 SEC — HIGH (ref 9.8–12.7)
RBC # BLD: 2.87 M/UL — LOW (ref 4.2–5.8)
RBC # BLD: 3.05 M/UL — LOW (ref 4.2–5.8)
RBC # FLD: 16.4 % — HIGH (ref 10.3–14.5)
RBC # FLD: 16.6 % — HIGH (ref 10.3–14.5)
SODIUM SERPL-SCNC: 134 MMOL/L — LOW (ref 135–145)
TSH SERPL-MCNC: 3.66 UIU/ML — SIGNIFICANT CHANGE UP (ref 0.27–4.2)
WBC # BLD: 10.8 K/UL — HIGH (ref 3.8–10.5)
WBC # BLD: 12.1 K/UL — HIGH (ref 3.8–10.5)
WBC # FLD AUTO: 10.8 K/UL — HIGH (ref 3.8–10.5)
WBC # FLD AUTO: 12.1 K/UL — HIGH (ref 3.8–10.5)

## 2017-08-24 PROCEDURE — 93750 INTERROGATION VAD IN PERSON: CPT

## 2017-08-24 PROCEDURE — 99233 SBSQ HOSP IP/OBS HIGH 50: CPT | Mod: 25,GC

## 2017-08-24 PROCEDURE — 99232 SBSQ HOSP IP/OBS MODERATE 35: CPT | Mod: GC

## 2017-08-24 PROCEDURE — 90832 PSYTX W PT 30 MINUTES: CPT

## 2017-08-24 PROCEDURE — 71010: CPT | Mod: 26

## 2017-08-24 RX ORDER — SPIRONOLACTONE 25 MG/1
25 TABLET, FILM COATED ORAL DAILY
Qty: 0 | Refills: 0 | Status: DISCONTINUED | OUTPATIENT
Start: 2017-08-24 | End: 2017-08-26

## 2017-08-24 RX ORDER — QUETIAPINE FUMARATE 200 MG/1
50 TABLET, FILM COATED ORAL AT BEDTIME
Qty: 0 | Refills: 0 | Status: DISCONTINUED | OUTPATIENT
Start: 2017-08-24 | End: 2017-08-28

## 2017-08-24 RX ORDER — SODIUM CHLORIDE 9 MG/ML
3 INJECTION INTRAMUSCULAR; INTRAVENOUS; SUBCUTANEOUS EVERY 8 HOURS
Qty: 0 | Refills: 0 | Status: DISCONTINUED | OUTPATIENT
Start: 2017-08-24 | End: 2017-08-28

## 2017-08-24 RX ORDER — CARVEDILOL PHOSPHATE 80 MG/1
6.25 CAPSULE, EXTENDED RELEASE ORAL EVERY 12 HOURS
Qty: 0 | Refills: 0 | Status: DISCONTINUED | OUTPATIENT
Start: 2017-08-24 | End: 2017-08-28

## 2017-08-24 RX ADMIN — Medication 100 MILLIGRAM(S): at 13:54

## 2017-08-24 RX ADMIN — SODIUM CHLORIDE 3 MILLILITER(S): 9 INJECTION INTRAMUSCULAR; INTRAVENOUS; SUBCUTANEOUS at 10:25

## 2017-08-24 RX ADMIN — MEXILETINE HYDROCHLORIDE 150 MILLIGRAM(S): 150 CAPSULE ORAL at 22:34

## 2017-08-24 RX ADMIN — Medication 20 MILLIGRAM(S): at 06:23

## 2017-08-24 RX ADMIN — PANTOPRAZOLE SODIUM 40 MILLIGRAM(S): 20 TABLET, DELAYED RELEASE ORAL at 06:23

## 2017-08-24 RX ADMIN — QUETIAPINE FUMARATE 50 MILLIGRAM(S): 200 TABLET, FILM COATED ORAL at 22:34

## 2017-08-24 RX ADMIN — CARVEDILOL PHOSPHATE 6.25 MILLIGRAM(S): 80 CAPSULE, EXTENDED RELEASE ORAL at 22:35

## 2017-08-24 RX ADMIN — SODIUM CHLORIDE 3 MILLILITER(S): 9 INJECTION INTRAMUSCULAR; INTRAVENOUS; SUBCUTANEOUS at 22:36

## 2017-08-24 RX ADMIN — Medication 1 MILLIGRAM(S): at 11:19

## 2017-08-24 RX ADMIN — CARVEDILOL PHOSPHATE 6.25 MILLIGRAM(S): 80 CAPSULE, EXTENDED RELEASE ORAL at 11:19

## 2017-08-24 RX ADMIN — AMIODARONE HYDROCHLORIDE 200 MILLIGRAM(S): 400 TABLET ORAL at 06:23

## 2017-08-24 RX ADMIN — SPIRONOLACTONE 25 MILLIGRAM(S): 25 TABLET, FILM COATED ORAL at 11:19

## 2017-08-24 RX ADMIN — Medication 100 MILLIGRAM(S): at 06:23

## 2017-08-24 RX ADMIN — PANTOPRAZOLE SODIUM 40 MILLIGRAM(S): 20 TABLET, DELAYED RELEASE ORAL at 17:31

## 2017-08-24 RX ADMIN — MEXILETINE HYDROCHLORIDE 150 MILLIGRAM(S): 150 CAPSULE ORAL at 13:53

## 2017-08-24 RX ADMIN — Medication 325 MILLIGRAM(S): at 11:19

## 2017-08-24 RX ADMIN — MEXILETINE HYDROCHLORIDE 150 MILLIGRAM(S): 150 CAPSULE ORAL at 06:24

## 2017-08-24 RX ADMIN — Medication 500 MILLIGRAM(S): at 11:19

## 2017-08-24 NOTE — PROGRESS NOTE ADULT - ASSESSMENT
IMP:    Melena: s/p push enteroscopy with APC of 3 jejunal angioectasias. H/H stable. No overt bleeding    PLAN:  - monitor H/H, transfuse accordingly  - daily PPI (take 30 minutes prior to breakfast)  - diet as tolerated

## 2017-08-24 NOTE — PROGRESS NOTE ADULT - SUBJECTIVE AND OBJECTIVE BOX
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  Found to be hypotensive, admitted to CTU for further management; found to be anemic, transfused 3U PRBC’s, seen by GI, underwent enteroscopy and had 3 AVMs APC'ed.    67 year old man with chronic systolic heart failure, ACC/AHA stage D, due to nonischemic cardiomyopathy, s/p LVAD 6/12/17, GIB now presents to [facility] ED with decreased PO intake and altered mental status as per brother, Nawaf, who accompanies him, since Saturday 8/19/17. The patient went to the LVAD clinic for an appt on Friday, 8/18 where he seemed slightly lethargic for which they gave him food and water which seemed to help and he went home. Nawaf reported that the patient is normally very energetic and talkative and has a good appetite, but since Saturday has has decreased PO intake and been much more lethargic than usual as well as having one episode of melena. The patient had 1 episode of emesis on Sunday night, 8/20/17 around 9pm. The patient was found to be guaiac + in the ED. Denies any recent fevers, chills, diarrhea, recent travel, pain at the LVAD driveline site.    Behavioral Health Assessment:     Mood:  "feeling good today."         Current stressors:   Readmission to hospital   Adjustment to living with LVAD        Support system/family support: Good support from family and close friend      Coping strategies: Talking with family and staff, watching TV; thinking about his granddaughter     Understanding of medical illness and treatment plan:  Good understanding of LVAD management; will benefit from ongoing education.      MSE: Pt seen resting in bed.  A&Ox3.  Well related with good eye contact.  Thought process goal directed.  No abnormal thought content; denies s/i.  Mood upbeat.  Affect full range.

## 2017-08-24 NOTE — PROGRESS NOTE ADULT - SUBJECTIVE AND OBJECTIVE BOX
Heart Failure Progress Note  Fellow Pager [phone]  After 5:00PM or weekends call Cardiology On-Call [facility] [phone]    Interval History:  No complaints.    Medications:  mexiletine 150 milliGRAM(s) Oral every 8 hours  amiodarone    Tablet 200 milliGRAM(s) Oral daily  pantoprazole  Injectable 40 milliGRAM(s) IV Push every 12 hours  ferrous    sulfate 325 milliGRAM(s) Oral daily  folic acid 1 milliGRAM(s) Oral daily  ascorbic acid 500 milliGRAM(s) Oral daily  docusate sodium 100 milliGRAM(s) Oral three times a day  furosemide    Tablet 20 milliGRAM(s) Oral daily  carvedilol 6.25 milliGRAM(s) Oral every 12 hours  QUEtiapine 50 milliGRAM(s) Oral at bedtime  spironolactone 25 milliGRAM(s) Oral daily  sodium chloride 0.9% lock flush 3 milliLiter(s) IV Push every 8 hours    Vitals:  T(C): 36.4 (17 @ 14:00), Max: 36.6 (17 @ 00:00)  HR: 82 (17 @ 14:00) (77 - 94)  BP: 95/65 (17 @ 14:00) (95/65 - 102/60)  BP(mean): 75 (17 @ 14:00) (75 - 80)  ABP: 101/71 (17 @ 09:00) (82/66 - 104/80)  ABP(mean): 81 (17 @ 09:00) (73 - 91)  RR: 18 (17 @ 14:00) (11 - 41)  SpO2: 97% (17 @ 14:00) (97% - 100%)      Daily Weight in k.2 (24 Aug 2017 03:00)    I&O's Summary    23 Aug 2017 07:01  -  24 Aug 2017 07:00  --------------------------------------------------------  IN: 1280 mL / OUT: 1075 mL / NET: 205 mL    24 Aug 2017 07:01  -  24 Aug 2017 17:01  --------------------------------------------------------  IN: 630 mL / OUT: 750 mL / NET: -120 mL    Physical Exam:  Appearance: No distress  HEENT:   mmm  Cardiovascular: s1s2 present, typical LVAD hum present with pulsatility  Respiratory: Lungs clear to auscultation	  Psychiatry: A & O x 3, Mood & affect appropriate  Gastrointestinal:  soft nt nd	  Skin: No cyanosis	  Neurologic: grossly non-focal  Extremities: no cyanosis    LVAD Interrogation: HeartMate II  Pump Flow: 3.6  Pump Speed: 9200  Pulse Index: 6.4  Pump Power: 4.8  VAD Events:  none  Driveline evaluation: intact    Programming Changes: No changes made    Labs:                        9.1    10.8  )-----------( 279      ( 24 Aug 2017 07:01 )             27.3     08    134<L>  |  106  |  26<H>  ----------------------------<  93  4.5   |  16<L>  |  1.02    Ca    8.8      24 Aug 2017 01:35  Phos  4.1         TPro  6.8  /  Alb  3.3  /  TBili  0.5  /  DBili  x   /  AST  36  /  ALT  33  /  AlkPhos  103  0824  PT/INR - ( 24 Aug 2017 03:13 )   PT: 31.2 sec;   INR: 2.83 ratio    PTT - ( 24 Aug 2017 03:13 )  PTT:37.2 sec  Lactate Dehydrogenase, Serum: 215 U/L ( @ 01:38)    TELEMETRY: reviewed. no significant events

## 2017-08-24 NOTE — PROGRESS NOTE ADULT - SUBJECTIVE AND OBJECTIVE BOX
VITAL SIGNS    Telemetry: SR 80-90   T(C): 36.4   T(F): 97.5   HR: 82 (0  BP: 95/65   RR: 18   SpO2: 97%            08-23 @ 07:01 - 08-24 @ 07:00  --------------------------------------------------------  IN: 1280 mL / OUT: 1075 mL / NET: 205 mL    08-24 @ 07:01 - 08-24 @ 17:48  --------------------------------------------------------  IN: 630 mL / OUT: 750 mL / NET: -120 mL    PHYSICAL EXAM    Subjective: NAD  Neurology: alert and oriented x 3, nonfocal, no gross deficits  CV : LVAD sounds  Lungs: CTA  Abdomen: soft, NT,ND, (+ )BM  :  voiding  Extremities:-c/c/e         LABS  08-24    134<L>  |  106  |  26<H>  ----------------------------<  93  4.5   |  16<L>  |  1.02    Ca    8.8      24 Aug 2017 01:35  Phos  4.1     08-24    TPro  6.8  /  Alb  3.3  /  TBili  0.5  /  DBili  x   /  AST  36  /  ALT  33  /  AlkPhos  103  08-24                                 9.1    10.8  )-----------( 279      ( 24 Aug 2017 07:01 )             27.3          PT/INR - ( 24 Aug 2017 16:47 )   PT: 27.7 sec;   INR: 2.41 ratio         PTT - ( 24 Aug 2017 03:13 )  PTT:37.2 sec

## 2017-08-24 NOTE — PROGRESS NOTE ADULT - SUBJECTIVE AND OBJECTIVE BOX
Chief Complaint:  Patient is a 67y old  Male who presents with a chief complaint of Decreased PO intake, AMS x 1 day (21 Aug 2017 02:06)      Interval Events: s/p push enteroscopy yesterday with APC of jejunal angioectasias. Afebrile. No abdominal pain/nausea/vomiting. No BM     Allergies:  No Known Allergies      Home Medications:    Hospital Medications:  mexiletine 150 milliGRAM(s) Oral every 8 hours  amiodarone    Tablet 200 milliGRAM(s) Oral daily  pantoprazole  Injectable 40 milliGRAM(s) IV Push every 12 hours  ferrous    sulfate 325 milliGRAM(s) Oral daily  folic acid 1 milliGRAM(s) Oral daily  ascorbic acid 500 milliGRAM(s) Oral daily  docusate sodium 100 milliGRAM(s) Oral three times a day  furosemide    Tablet 20 milliGRAM(s) Oral daily      PMHX/PSHX:  GIB (gastrointestinal bleeding)  H/O prior ablation treatment  Ventricular fibrillation  PAF (paroxysmal atrial fibrillation)  Non-Ischemic Cardiomyopathy  SVT (Supraventricular Tachycardia)  HTN  CHF (Congestive Heart Failure)  LVAD (left ventricular assist device) present  Status post left hip replacement  Hypertension  Hypertension  History of Prior Ablation Treatment  AICD (Automatic Cardioverter/Defibrillator) Present      Family history:  No pertinent family history in first degree relatives      ROS: as per HPI     PHYSICAL EXAM:   Vital Signs:  Vital Signs Last 24 Hrs  T(C): 36.3 (24 Aug 2017 04:00), Max: 36.6 (24 Aug 2017 00:00)  T(F): 97.3 (24 Aug 2017 04:00), Max: 97.9 (24 Aug 2017 00:00)  HR: 86 (24 Aug 2017 08:00) (77 - 94)  BP: --  BP(mean): --  RR: 22 (24 Aug 2017 08:00) (11 - 41)  SpO2: 100% (24 Aug 2017 08:00) (98% - 100%)  Daily     Daily Weight in k.2 (24 Aug 2017 03:00)    GENERAL:  NAD  HEENT:  NC/AT  CHEST:  Fbreath sounds clear  HEART:  Regular rhythm  ABDOMEN:  Soft, non-tender, non-distended, normoactive bowel sounds,  no masses , no hepatosplenomegaly  NEURO:  Alert, oriented    LABS:                        9.1    10.8  )-----------( 279      ( 24 Aug 2017 07:01 )             27.3         134<L>  |  106  |  26<H>  ----------------------------<  93  4.5   |  16<L>  |  1.02    Ca    8.8      24 Aug 2017 01:35  Phos  4.1         TPro  6.8  /  Alb  3.3  /  TBili  0.5  /  DBili  x   /  AST  36  /  ALT  33  /  AlkPhos  103      LIVER FUNCTIONS - ( 24 Aug 2017 01:35 )  Alb: 3.3 g/dL / Pro: 6.8 g/dL / ALK PHOS: 103 U/L / ALT: 33 U/L RC / AST: 36 U/L / GGT: x           PT/INR - ( 24 Aug 2017 03:13 )   PT: 31.2 sec;   INR: 2.83 ratio         PTT - ( 24 Aug 2017 03:13 )  PTT:37.2 sec        Imaging:

## 2017-08-24 NOTE — PROGRESS NOTE ADULT - ASSESSMENT
67M NICM EF 20%, s/p LVAD 6/12/2017, hx of GIB/AVM's who presents with symptomatic blood loss anemia, s/p APC of jejunal angioectasias on 8/23/2017.

## 2017-08-24 NOTE — PROGRESS NOTE ADULT - ASSESSMENT
Slept well last night.  Appetite good.  Was moved to step down unit today.  Mood upbeat. Coping well with hospitalization.  Reviewed simple checklist with patient designed to assist with LVAD management at home.   Handout utilizes pictures and simple instructions to accommodate patient’s limited reading skills.   Team has been utilizing this method of teaching since pre-LVAD implant with good results.  Pt able to teach back the information accurately.   Has good support at home from his 2 brothers (Nawaf and Bladimir) who prepare meals, and his close friend Tahira who assists with LVAD management.         DX:  Systolic heart failure; r/o Adjustment disorder with anxiety/depression     Recommendations:   Behavioral Cardiology will continue to follow   Review LVAD management with teach back.

## 2017-08-24 NOTE — PROGRESS NOTE ADULT - ASSESSMENT
67M NICM EF 20%, s/p LVAD 6/12/2017, hx of GIB/AVM's who presents with symptomatic blood loss anemia, s/p APC of jejunal angioectasias on 8/23/2017. Stable.    #LVAD present - PI events. presented with continued supratherapeutic INR  -INR supratherapeutic. hold dose tonight and dose pending INR tomorrow  -MAP goal 70-80  -cont carvedilol 6.25mg BID  -resume PO furosemide 20mg daily, spironolactone 25mg     #NORMAN - resolved  -cont lisinopril 2.5mg daily     #blood loss anemia - s/p GI intervention. usually in setting of supratherapeutic INR  -H/H stable

## 2017-08-25 LAB
ANION GAP SERPL CALC-SCNC: 12 MMOL/L — SIGNIFICANT CHANGE UP (ref 5–17)
APTT BLD: 33.1 SEC — SIGNIFICANT CHANGE UP (ref 27.5–37.4)
BUN SERPL-MCNC: 26 MG/DL — HIGH (ref 7–23)
CALCIUM SERPL-MCNC: 8.7 MG/DL — SIGNIFICANT CHANGE UP (ref 8.4–10.5)
CHLORIDE SERPL-SCNC: 105 MMOL/L — SIGNIFICANT CHANGE UP (ref 96–108)
CO2 SERPL-SCNC: 19 MMOL/L — LOW (ref 22–31)
CREAT SERPL-MCNC: 1.41 MG/DL — HIGH (ref 0.5–1.3)
GLUCOSE SERPL-MCNC: 98 MG/DL — SIGNIFICANT CHANGE UP (ref 70–99)
HCT VFR BLD CALC: 25.5 % — LOW (ref 39–50)
HGB BLD-MCNC: 9 G/DL — LOW (ref 13–17)
INR BLD: 1.95 RATIO — HIGH (ref 0.88–1.16)
LDH SERPL L TO P-CCNC: 248 U/L — HIGH (ref 50–242)
MCHC RBC-ENTMCNC: 31.9 PG — SIGNIFICANT CHANGE UP (ref 27–34)
MCHC RBC-ENTMCNC: 35.2 GM/DL — SIGNIFICANT CHANGE UP (ref 32–36)
MCV RBC AUTO: 90.7 FL — SIGNIFICANT CHANGE UP (ref 80–100)
PLATELET # BLD AUTO: 260 K/UL — SIGNIFICANT CHANGE UP (ref 150–400)
POTASSIUM SERPL-MCNC: 4.5 MMOL/L — SIGNIFICANT CHANGE UP (ref 3.5–5.3)
POTASSIUM SERPL-SCNC: 4.5 MMOL/L — SIGNIFICANT CHANGE UP (ref 3.5–5.3)
PROTHROM AB SERPL-ACNC: 21.5 SEC — HIGH (ref 9.8–12.7)
RBC # BLD: 2.81 M/UL — LOW (ref 4.2–5.8)
RBC # FLD: 17.2 % — HIGH (ref 10.3–14.5)
SODIUM SERPL-SCNC: 136 MMOL/L — SIGNIFICANT CHANGE UP (ref 135–145)
WBC # BLD: 10.4 K/UL — SIGNIFICANT CHANGE UP (ref 3.8–10.5)
WBC # FLD AUTO: 10.4 K/UL — SIGNIFICANT CHANGE UP (ref 3.8–10.5)

## 2017-08-25 PROCEDURE — 99233 SBSQ HOSP IP/OBS HIGH 50: CPT | Mod: 25,GC

## 2017-08-25 PROCEDURE — 90834 PSYTX W PT 45 MINUTES: CPT

## 2017-08-25 PROCEDURE — 93750 INTERROGATION VAD IN PERSON: CPT

## 2017-08-25 RX ORDER — WARFARIN SODIUM 2.5 MG/1
3 TABLET ORAL ONCE
Qty: 0 | Refills: 0 | Status: COMPLETED | OUTPATIENT
Start: 2017-08-25 | End: 2017-08-25

## 2017-08-25 RX ORDER — FUROSEMIDE 40 MG
20 TABLET ORAL EVERY OTHER DAY
Qty: 0 | Refills: 0 | Status: DISCONTINUED | OUTPATIENT
Start: 2017-08-25 | End: 2017-08-28

## 2017-08-25 RX ORDER — ACETAMINOPHEN 500 MG
650 TABLET ORAL EVERY 6 HOURS
Qty: 0 | Refills: 0 | Status: DISCONTINUED | OUTPATIENT
Start: 2017-08-25 | End: 2017-08-28

## 2017-08-25 RX ADMIN — MEXILETINE HYDROCHLORIDE 150 MILLIGRAM(S): 150 CAPSULE ORAL at 13:25

## 2017-08-25 RX ADMIN — SODIUM CHLORIDE 3 MILLILITER(S): 9 INJECTION INTRAMUSCULAR; INTRAVENOUS; SUBCUTANEOUS at 22:13

## 2017-08-25 RX ADMIN — QUETIAPINE FUMARATE 50 MILLIGRAM(S): 200 TABLET, FILM COATED ORAL at 22:12

## 2017-08-25 RX ADMIN — SPIRONOLACTONE 25 MILLIGRAM(S): 25 TABLET, FILM COATED ORAL at 06:17

## 2017-08-25 RX ADMIN — Medication 500 MILLIGRAM(S): at 13:25

## 2017-08-25 RX ADMIN — Medication 650 MILLIGRAM(S): at 15:00

## 2017-08-25 RX ADMIN — Medication 1 MILLIGRAM(S): at 13:25

## 2017-08-25 RX ADMIN — SODIUM CHLORIDE 3 MILLILITER(S): 9 INJECTION INTRAMUSCULAR; INTRAVENOUS; SUBCUTANEOUS at 06:21

## 2017-08-25 RX ADMIN — CARVEDILOL PHOSPHATE 6.25 MILLIGRAM(S): 80 CAPSULE, EXTENDED RELEASE ORAL at 06:17

## 2017-08-25 RX ADMIN — PANTOPRAZOLE SODIUM 40 MILLIGRAM(S): 20 TABLET, DELAYED RELEASE ORAL at 17:21

## 2017-08-25 RX ADMIN — MEXILETINE HYDROCHLORIDE 150 MILLIGRAM(S): 150 CAPSULE ORAL at 22:12

## 2017-08-25 RX ADMIN — Medication 100 MILLIGRAM(S): at 22:12

## 2017-08-25 RX ADMIN — Medication 650 MILLIGRAM(S): at 14:00

## 2017-08-25 RX ADMIN — AMIODARONE HYDROCHLORIDE 200 MILLIGRAM(S): 400 TABLET ORAL at 06:17

## 2017-08-25 RX ADMIN — MEXILETINE HYDROCHLORIDE 150 MILLIGRAM(S): 150 CAPSULE ORAL at 06:20

## 2017-08-25 RX ADMIN — Medication 325 MILLIGRAM(S): at 13:25

## 2017-08-25 RX ADMIN — Medication 100 MILLIGRAM(S): at 13:25

## 2017-08-25 RX ADMIN — Medication 20 MILLIGRAM(S): at 06:17

## 2017-08-25 RX ADMIN — WARFARIN SODIUM 3 MILLIGRAM(S): 2.5 TABLET ORAL at 22:12

## 2017-08-25 RX ADMIN — SODIUM CHLORIDE 3 MILLILITER(S): 9 INJECTION INTRAMUSCULAR; INTRAVENOUS; SUBCUTANEOUS at 11:45

## 2017-08-25 RX ADMIN — CARVEDILOL PHOSPHATE 6.25 MILLIGRAM(S): 80 CAPSULE, EXTENDED RELEASE ORAL at 17:20

## 2017-08-25 RX ADMIN — PANTOPRAZOLE SODIUM 40 MILLIGRAM(S): 20 TABLET, DELAYED RELEASE ORAL at 06:20

## 2017-08-25 NOTE — PROGRESS NOTE ADULT - SUBJECTIVE AND OBJECTIVE BOX
Heart Failure Progress Note  Fellow Pager [phone]  After 5:00PM or weekends call Cardiology On-Call [facility] [phone]    Interval History:    Medications:  mexiletine 150 milliGRAM(s) Oral every 8 hours  amiodarone    Tablet 200 milliGRAM(s) Oral daily  pantoprazole  Injectable 40 milliGRAM(s) IV Push every 12 hours  ferrous    sulfate 325 milliGRAM(s) Oral daily  folic acid 1 milliGRAM(s) Oral daily  ascorbic acid 500 milliGRAM(s) Oral daily  docusate sodium 100 milliGRAM(s) Oral three times a day  furosemide    Tablet 20 milliGRAM(s) Oral daily  carvedilol 6.25 milliGRAM(s) Oral every 12 hours  QUEtiapine 50 milliGRAM(s) Oral at bedtime  spironolactone 25 milliGRAM(s) Oral daily  sodium chloride 0.9% lock flush 3 milliLiter(s) IV Push every 8 hours    Vitals:  T(C): 36.4 (17 @ 10:00), Max: 36.7 (17 @ 22:01)  HR: 84 (17 @ 10:00) (77 - 84)  BP: 99/75 (17 @ 10:00) (83/53 - 112/83)  BP(mean): 83 (17 @ 10:00) (63 - 90)  ABP: --  ABP(mean): --  RR: 18 (17 @ 10:00) (18 - 20)  SpO2: 99% (17 @ 10:00) (97% - 99%)  Wt(kg): --  CVP(cm H2O): --  CO: --  CI: --  PA: --  PA(mean): --  PCWP: --  SVR: --  PVR: --    Daily     Daily Weight in k.7 (25 Aug 2017 08:11)        I&O's Summary    24 Aug 2017 07:01  -  25 Aug 2017 07:00  --------------------------------------------------------  IN: 990 mL / OUT: 900 mL / NET: 90 mL        Physical Exam:  Appearance: No Acute Distress  HEENT: No JVD  Cardiovascular:  S1 S2, No murmurs/rubs/gallops  Respiratory: Clear to auscultation bilaterally  Gastrointestinal: Soft, Non-tender	  Skin: No cyanosis	  Neurologic: Non-focal  Extremities: No LE edema  Psychiatry: A & O x 3, Mood & affect appropriate    LVAD Interrogation:  Pump Flow:  Pump Speed:  Pulse Index:  Pump Power:  VAD Events:   Driveline evaluation:    Programming Changes: No changes made    Labs:                        9.0    10.4  )-----------( 260      ( 25 Aug 2017 05:27 )             25.5         136  |  105  |  26<H>  ----------------------------<  98  4.5   |  19<L>  |  1.41<H>    Ca    8.7      25 Aug 2017 05:27  Phos  4.1         TPro  6.8  /  Alb  3.3  /  TBili  0.5  /  DBili  x   /  AST  36  /  ALT  33  /  AlkPhos  103      PT/INR - ( 25 Aug 2017 05:27 )   PT: 21.5 sec;   INR: 1.95 ratio         PTT - ( 25 Aug 2017 05:27 )  PTT:33.1 sec          Lactate Dehydrogenase, Serum: 248 U/L ( @ 05:27)          TELEMETRY:    [ ] Echocardiogram: Heart Failure Progress Note  Fellow Pager [phone]  After 5:00PM or weekends call Cardiology On-Call [facility] [phone]    Interval History:  Pt feels well. Had one dark BM.    Medications:  mexiletine 150 milliGRAM(s) Oral every 8 hours  amiodarone    Tablet 200 milliGRAM(s) Oral daily  pantoprazole  Injectable 40 milliGRAM(s) IV Push every 12 hours  ferrous    sulfate 325 milliGRAM(s) Oral daily  folic acid 1 milliGRAM(s) Oral daily  ascorbic acid 500 milliGRAM(s) Oral daily  docusate sodium 100 milliGRAM(s) Oral three times a day  furosemide    Tablet 20 milliGRAM(s) Oral daily  carvedilol 6.25 milliGRAM(s) Oral every 12 hours  QUEtiapine 50 milliGRAM(s) Oral at bedtime  spironolactone 25 milliGRAM(s) Oral daily  sodium chloride 0.9% lock flush 3 milliLiter(s) IV Push every 8 hours    Vitals:  T(C): 36.4 (17 @ 10:00), Max: 36.7 (17 @ 22:01)  HR: 84 (17 @ 10:00) (77 - 84)  BP: 99/75 (17 @ 10:00) (83/53 - 112/83)  BP(mean): 83 (17 @ 10:00) (63 - 90)  RR: 18 (17 @ 10:00) (18 - 20)  SpO2: 99% (17 @ 10:00) (97% - 99%)    Daily Weight in k.7 (25 Aug 2017 08:11)    I&O's Summary    24 Aug 2017 07:01  -  25 Aug 2017 07:00  --------------------------------------------------------  IN: 990 mL / OUT: 900 mL / NET: 90 mL    Physical Exam:  Appearance: No distress  HEENT:   mmm  Cardiovascular: s1s2 present, typical LVAD hum present  Respiratory: Lungs clear to auscultation	  Psychiatry: A & O x 3, Mood & affect appropriate  Gastrointestinal:  soft nt nd	  Skin: No cyanosis	  Neurologic: grossly non-focal  Extremities: no cyanosis    LVAD Interrogation: Heart Mate II  Pump Flow: 5.0  Pump Speed: 9200  Pulse Index: 6.4  Pump Power: 5.4  VAD Events: no events  Driveline evaluation:  intact  Programming Changes: No changes made    Labs:             9.0    10.4  )-----------( 260      ( 25 Aug 2017 05:27 )             25.5       136  |  105  |  26<H>  ----------------------------<  98  4.5   |  19<L>  |  1.41<H>    Ca    8.7      25 Aug 2017 05:27  Phos  4.1         TPro  6.8  /  Alb  3.3  /  TBili  0.5  /  DBili  x   /  AST  36  /  ALT  33  /  AlkPhos  103  24    PT/INR - ( 25 Aug 2017 05:27 )   PT: 21.5 sec;   INR: 1.95 ratio    PTT - ( 25 Aug 2017 05:27 )  PTT:33.1 sec    Lactate Dehydrogenase, Serum: 248 U/L ( @ 05:27)    TELEMETRY: SR 70s-80s

## 2017-08-25 NOTE — PROGRESS NOTE ADULT - SUBJECTIVE AND OBJECTIVE BOX
Behavioral Cardiology Progress Note     HPI:  68 y/o male with advanced heart failure (NICM); s/p LVAD implant (6/12/17), course complicated by GIB found to be 2/2 AVMs s/p APC with push enteroscopy (7/6/17), discharged to [facility] (7/17/17); admitted from rehab with c/o melena x 3 days with suspicion of another GIB, s/p cautery (7/25/17); presented to [facility] ED with decreased PO intake, vomiting, melena, and AMS.  Found to be hypotensive, admitted to CTU for further management; found to be anemic, transfused 3U PRBC’s, seen by GI, underwent enteroscopy and had 3 AVMs APC'ed.    67 year old man with chronic systolic heart failure, ACC/AHA stage D, due to nonischemic cardiomyopathy, s/p LVAD 6/12/17, GIB now presents to [facility] ED with decreased PO intake and altered mental status as per brother, Nawaf, who accompanies him, since Saturday 8/19/17. The patient went to the LVAD clinic for an appt on Friday, 8/18 where he seemed slightly lethargic for which they gave him food and water which seemed to help and he went home. Nawaf reported that the patient is normally very energetic and talkative and has a good appetite, but since Saturday has has decreased PO intake and been much more lethargic than usual as well as having one episode of melena. The patient had 1 episode of emesis on Sunday night, 8/20/17 around 9pm. The patient was found to be guaiac + in the ED. Denies any recent fevers, chills, diarrhea, recent travel, pain at the LVAD driveline site.    Behavioral Health Assessment:     Mood:  "I'm doing good"         Current stressors:   Readmission to hospital   Adjustment to living with LVAD        Support system/family support: Good support from family and close friend      Coping strategies: Talking with family and staff, watching TV; thinking about his granddaughter     Understanding of medical illness and treatment plan:  Good understanding of LVAD management; will benefit from ongoing education.      MSE: Pt seen resting in bed.  A&Ox3.  Well related with good eye contact.  Thought process goal directed.  No abnormal thought content; denies s/i.  Mood upbeat.  Affect full range.  Insight and judgment adequate.

## 2017-08-25 NOTE — PROGRESS NOTE ADULT - ASSESSMENT
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  Hospital Course:   CHF following   GI following --> AC therapy on hold for supra therapeutic INR   8/25 INR 1.95 --> Coumadin resumed

## 2017-08-25 NOTE — PROGRESS NOTE ADULT - ASSESSMENT
Assessment and Plan:   · Assessment		  Slept "great" last night.  Appetite good.  Mood upbeat. Coping well with hospitalization. Receptive to support and validation. Reviewed simple checklist with patient designed to assist with LVAD management at home.   Handout utilizes pictures and simple instructions to accommodate patient’s limited reading skills.   Team has been utilizing this method of teaching since pre-LVAD implant with good results.  Pt able to teach back the information accurately.   Has good support at home from his 2 brothers (Nawaf and Bladimir) who prepare meals, and his close friend Tahira who assists with LVAD management.  According to pt, his friend Tahira has scheduled for  to deliver healthy meals to his home.          DX:  Systolic heart failure; r/o Adjustment disorder with anxiety/depression     Recommendations:   Behavioral Cardiology will continue to follow   Review LVAD management with teach back using easy to read information handout.

## 2017-08-25 NOTE — PROGRESS NOTE ADULT - SUBJECTIVE AND OBJECTIVE BOX
VITAL SIGNS    Telemetry:  NSR      Vital Signs Last 24 Hrs  T(C): 36.5 (17 @ 17:20), Max: 36.7 (17 @ 22:01)  T(F): 97.7 (17 @ 17:20), Max: 98.1 (17 @ 22:01)  HR: 81 (17 @ 17:20) (73 - 84)  BP: 98/78 (17 @ 17:20) (83/53 - 112/83)  RR: 18 (17 @ 17:20) (18 - 20)  SpO2: 100% (17 @ 17:20) (98% - 100%)              @ 07:01  -   @ 07:00  --------------------------------------------------------  IN: 990 mL / OUT: 900 mL / NET: 90 mL     @ 07:01  -   @ 17:28  --------------------------------------------------------  IN: 570 mL / OUT: 650 mL / NET: -80 mL      Daily     Daily Weight in k.7 (25 Aug 2017 08:11)          PHYSICAL EXAM        Neurology: alert and oriented x 3, nonfocal, no gross deficits    CV : (+) LVAD Hum     Sternal Wound :  CDI; healed     Lungs: CTA  B/L     Abdomen: soft, nontender, nondistended, positive bowel sounds; RLQ Drive line site C/D/I; (+) BM (+) dark black stool     : Voiding           Extremities: B/L LE negative edema; (+) 2 DP palpable; (-) calf tenderness         mexiletine 150 milliGRAM(s) Oral every 8 hours  amiodarone    Tablet 200 milliGRAM(s) Oral daily  pantoprazole  Injectable 40 milliGRAM(s) IV Push every 12 hours  ferrous    sulfate 325 milliGRAM(s) Oral daily  folic acid 1 milliGRAM(s) Oral daily  ascorbic acid 500 milliGRAM(s) Oral daily  docusate sodium 100 milliGRAM(s) Oral three times a day  furosemide    Tablet 20 milliGRAM(s) Oral daily  carvedilol 6.25 milliGRAM(s) Oral every 12 hours  QUEtiapine 50 milliGRAM(s) Oral at bedtime  spironolactone 25 milliGRAM(s) Oral daily  sodium chloride 0.9% lock flush 3 milliLiter(s) IV Push every 8 hours  acetaminophen   Tablet. 650 milliGRAM(s) Oral every 6 hours PRN      Physical Therapy Rec:   Home  [  ]   Home w/ PT  [X ]  Rehab  [  ]    Discussed with Cardiothoracic Team at AM rounds.

## 2017-08-25 NOTE — PROGRESS NOTE ADULT - ASSESSMENT
67M NICM EF 20%, s/p LVAD 6/12/2017, hx of GIB/AVM's who presents with symptomatic blood loss anemia, s/p APC of jejunal angioectasias on 8/23/2017. Stable.    #LVAD present -   -resume coumadin. 3mg tonight. goal INR 1.5-2.0  -given pulsatility, SBP goal 100-120  -cont carvedilol 6.25mg BID  -decrease PO furosemide 20mg/spironolactone to every other day     #NORMAN - resolved  -cont lisinopril 2.5mg daily     #blood loss anemia - s/p GI intervention. usually in setting of supratherapeutic INR  -H/H stable. resume AC and monitor

## 2017-08-25 NOTE — CHART NOTE - NSCHARTNOTEFT_GEN_A_CORE
Chart reviewed, pt s/p LVAD in 6/2017; now readmitted with GI bleed, symptomatic blood loss anemia, s/p APC of jejunal angiectasias.   Pt reports good appetite, eating 100% of breakfast meals and >75% of lunch and dinner meals. Pt states he is drinking 2 health shakes daily, requesting Ensure Enlive to optimize PO intake. Pt denies GI distress and reports no difficulty chewing/swallowing.   Pt able to teach-back nutrition therapy for CHF and Coumadin/Vitamin K interaction, receptive to further review.     Diet: regular     Admission weight: 58.4kg  Current Weight: 63.7kg  Weight fluctuations noted, likely due to fluid shifts. No peripheral edema noted.     Pertinent Medications: MEDICATIONS  (STANDING):  mexiletine 150 milliGRAM(s) Oral every 8 hours  amiodarone    Tablet 200 milliGRAM(s) Oral daily  pantoprazole  Injectable 40 milliGRAM(s) IV Push every 12 hours  ferrous    sulfate 325 milliGRAM(s) Oral daily  folic acid 1 milliGRAM(s) Oral daily  ascorbic acid 500 milliGRAM(s) Oral daily  docusate sodium 100 milliGRAM(s) Oral three times a day  furosemide    Tablet 20 milliGRAM(s) Oral daily  carvedilol 6.25 milliGRAM(s) Oral every 12 hours  QUEtiapine 50 milliGRAM(s) Oral at bedtime  spironolactone 25 milliGRAM(s) Oral daily  sodium chloride 0.9% lock flush 3 milliLiter(s) IV Push every 8 hours    MEDICATIONS  (PRN):  acetaminophen   Tablet. 650 milliGRAM(s) Oral every 6 hours PRN Mild Pain (1 - 3)    Pertinent Labs:  Hemoglobin: 9.0 g/dL (08.25.17 @ 05:27) - low  Hematocrit: 25.5 % (08.25.17 @ 05:27) - low  Basic Metabolic Panel - STAT (08.25.17 @ 05:27)    Sodium, Serum: 136 mmol/L    Potassium, Serum: 4.5 mmol/L    Chloride, Serum: 105 mmol/L    Blood Urea Nitrogen, Serum: 26 mg/dL - high    Creatinine, Serum: 1.41 mg/dL - high    Glucose, Serum: 98 mg/dL    Calcium, Total Serum: 8.7 mg/dL  Comprehensive Metabolic Panel (08.24.17 @ 01:35)    Protein Total, Serum: 6.8 g/dL    Albumin, Serum: 3.3 g/dL    Bilirubin Total, Serum: 0.5 mg/dL    Alkaline Phosphatase, Serum: 103 U/L    Aspartate Aminotransferase (AST/SGOT): 36 U/L    Alanine Aminotransferase (ALT/SGPT): 33 U/L RC  Phosphorus Level, Serum (08.24.17 @ 01:35)    Phosphorus Level, Serum: 4.1 mg/dL      Skin: no pressure ulcers noted.     Estimated Needs:   [ x ] no change since previous assessment  [ ] recalculated:       Previous Nutrition Diagnosis:   Moderate malnutrition remains, being addressed with PO diet and supplements.  Food and nutrition related knowledge deficit remains, improving with continued diet education.      New Nutrition Diagnosis: [ x ] not applicable    Interventions:   1. Recommend change diet to regular, low sodium.  2. Recommend Ensure Enlive 2 cans/day.  3. Encourage PO intake with all meals.  4. Provide food preferences within therapeutic diet when requested.  5. Reviewed alternate menu options, assisted pt with menu selections.  6. Reviewed nutrition therapy for CHF and Coumadin/Vitamin K interaction.     Monitoring and Evaluation:     [ x ] PO intake [ x ] Tolerance to diet prescription [ x ] weights [ x ] follow up per protocol    [ ] other:

## 2017-08-26 ENCOUNTER — TRANSCRIPTION ENCOUNTER (OUTPATIENT)
Age: 67
End: 2017-08-26

## 2017-08-26 LAB
ANION GAP SERPL CALC-SCNC: 12 MMOL/L — SIGNIFICANT CHANGE UP (ref 5–17)
BUN SERPL-MCNC: 25 MG/DL — HIGH (ref 7–23)
CALCIUM SERPL-MCNC: 8.9 MG/DL — SIGNIFICANT CHANGE UP (ref 8.4–10.5)
CHLORIDE SERPL-SCNC: 104 MMOL/L — SIGNIFICANT CHANGE UP (ref 96–108)
CO2 SERPL-SCNC: 19 MMOL/L — LOW (ref 22–31)
CREAT SERPL-MCNC: 1.25 MG/DL — SIGNIFICANT CHANGE UP (ref 0.5–1.3)
GLUCOSE SERPL-MCNC: 86 MG/DL — SIGNIFICANT CHANGE UP (ref 70–99)
HCT VFR BLD CALC: 26.1 % — LOW (ref 39–50)
HGB BLD-MCNC: 8.7 G/DL — LOW (ref 13–17)
INR BLD: 1.41 RATIO — HIGH (ref 0.88–1.16)
MCHC RBC-ENTMCNC: 31 PG — SIGNIFICANT CHANGE UP (ref 27–34)
MCHC RBC-ENTMCNC: 33.4 GM/DL — SIGNIFICANT CHANGE UP (ref 32–36)
MCV RBC AUTO: 92.6 FL — SIGNIFICANT CHANGE UP (ref 80–100)
PLATELET # BLD AUTO: 268 K/UL — SIGNIFICANT CHANGE UP (ref 150–400)
POTASSIUM SERPL-MCNC: 4.4 MMOL/L — SIGNIFICANT CHANGE UP (ref 3.5–5.3)
POTASSIUM SERPL-SCNC: 4.4 MMOL/L — SIGNIFICANT CHANGE UP (ref 3.5–5.3)
PROTHROM AB SERPL-ACNC: 15.3 SEC — HIGH (ref 9.8–12.7)
RBC # BLD: 2.82 M/UL — LOW (ref 4.2–5.8)
RBC # FLD: 17.8 % — HIGH (ref 10.3–14.5)
SODIUM SERPL-SCNC: 135 MMOL/L — SIGNIFICANT CHANGE UP (ref 135–145)
WBC # BLD: 9.5 K/UL — SIGNIFICANT CHANGE UP (ref 3.8–10.5)
WBC # FLD AUTO: 9.5 K/UL — SIGNIFICANT CHANGE UP (ref 3.8–10.5)

## 2017-08-26 PROCEDURE — 93750 INTERROGATION VAD IN PERSON: CPT

## 2017-08-26 PROCEDURE — 99233 SBSQ HOSP IP/OBS HIGH 50: CPT | Mod: 25

## 2017-08-26 RX ORDER — SPIRONOLACTONE 25 MG/1
12.5 TABLET, FILM COATED ORAL DAILY
Qty: 0 | Refills: 0 | Status: DISCONTINUED | OUTPATIENT
Start: 2017-08-26 | End: 2017-08-28

## 2017-08-26 RX ORDER — POLYETHYLENE GLYCOL 3350 17 G/17G
17 POWDER, FOR SOLUTION ORAL DAILY
Qty: 0 | Refills: 0 | Status: DISCONTINUED | OUTPATIENT
Start: 2017-08-26 | End: 2017-08-28

## 2017-08-26 RX ORDER — WARFARIN SODIUM 2.5 MG/1
2 TABLET ORAL ONCE
Qty: 0 | Refills: 0 | Status: COMPLETED | OUTPATIENT
Start: 2017-08-26 | End: 2017-08-26

## 2017-08-26 RX ADMIN — Medication 100 MILLIGRAM(S): at 06:10

## 2017-08-26 RX ADMIN — Medication 325 MILLIGRAM(S): at 13:37

## 2017-08-26 RX ADMIN — PANTOPRAZOLE SODIUM 40 MILLIGRAM(S): 20 TABLET, DELAYED RELEASE ORAL at 06:10

## 2017-08-26 RX ADMIN — QUETIAPINE FUMARATE 50 MILLIGRAM(S): 200 TABLET, FILM COATED ORAL at 21:46

## 2017-08-26 RX ADMIN — MEXILETINE HYDROCHLORIDE 150 MILLIGRAM(S): 150 CAPSULE ORAL at 13:38

## 2017-08-26 RX ADMIN — SODIUM CHLORIDE 3 MILLILITER(S): 9 INJECTION INTRAMUSCULAR; INTRAVENOUS; SUBCUTANEOUS at 13:38

## 2017-08-26 RX ADMIN — Medication 100 MILLIGRAM(S): at 21:46

## 2017-08-26 RX ADMIN — MEXILETINE HYDROCHLORIDE 150 MILLIGRAM(S): 150 CAPSULE ORAL at 21:45

## 2017-08-26 RX ADMIN — POLYETHYLENE GLYCOL 3350 17 GRAM(S): 17 POWDER, FOR SOLUTION ORAL at 13:37

## 2017-08-26 RX ADMIN — CARVEDILOL PHOSPHATE 6.25 MILLIGRAM(S): 80 CAPSULE, EXTENDED RELEASE ORAL at 17:33

## 2017-08-26 RX ADMIN — SODIUM CHLORIDE 3 MILLILITER(S): 9 INJECTION INTRAMUSCULAR; INTRAVENOUS; SUBCUTANEOUS at 22:00

## 2017-08-26 RX ADMIN — WARFARIN SODIUM 2 MILLIGRAM(S): 2.5 TABLET ORAL at 21:46

## 2017-08-26 RX ADMIN — SPIRONOLACTONE 25 MILLIGRAM(S): 25 TABLET, FILM COATED ORAL at 06:10

## 2017-08-26 RX ADMIN — AMIODARONE HYDROCHLORIDE 200 MILLIGRAM(S): 400 TABLET ORAL at 06:10

## 2017-08-26 RX ADMIN — Medication 500 MILLIGRAM(S): at 13:37

## 2017-08-26 RX ADMIN — Medication 20 MILLIGRAM(S): at 13:38

## 2017-08-26 RX ADMIN — SODIUM CHLORIDE 3 MILLILITER(S): 9 INJECTION INTRAMUSCULAR; INTRAVENOUS; SUBCUTANEOUS at 06:12

## 2017-08-26 RX ADMIN — PANTOPRAZOLE SODIUM 40 MILLIGRAM(S): 20 TABLET, DELAYED RELEASE ORAL at 17:34

## 2017-08-26 RX ADMIN — Medication 100 MILLIGRAM(S): at 13:37

## 2017-08-26 RX ADMIN — MEXILETINE HYDROCHLORIDE 150 MILLIGRAM(S): 150 CAPSULE ORAL at 06:10

## 2017-08-26 RX ADMIN — CARVEDILOL PHOSPHATE 6.25 MILLIGRAM(S): 80 CAPSULE, EXTENDED RELEASE ORAL at 06:10

## 2017-08-26 RX ADMIN — Medication 1 MILLIGRAM(S): at 13:37

## 2017-08-26 NOTE — DISCHARGE NOTE ADULT - MEDICATION SUMMARY - MEDICATIONS TO TAKE
I will START or STAY ON the medications listed below when I get home from the hospital:    spironolactone 25 mg oral tablet  -- 0.5 tab(s) by mouth once a day  -- Indication: For diuretic    acetaminophen 325 mg oral tablet  -- 2 tab(s) by mouth every 6 hours, As needed, Mild Pain (1 - 3)  -- Indication: For Pain    amiodarone 200 mg oral tablet  -- 1 tab(s) by mouth once a day  -- Indication: For Anti-arrythmic    mexiletine 150 mg oral capsule  -- 1 cap(s) by mouth every 8 hours  -- Indication: For Anti-arrythmic    Coumadin 1 mg oral tablet  -- 1 tab(s) by mouth once a day  -- Do not take this drug if you are pregnant.  It is very important that you take or use this exactly as directed.  Do not skip doses or discontinue unless directed by your doctor.  Obtain medical advice before taking any non-prescription drugs as some may affect the action of this medication.    -- Indication: For blood thinner    QUEtiapine 50 mg oral tablet  -- 1 tab(s) by mouth once a day (at bedtime)  -- Indication: For Anti-psychotic    carvedilol 6.25 mg oral tablet  -- 1 tab(s) by mouth every 12 hours  -- Indication: For Anti-arrythmic    hydrocortisone 0.5% topical cream  -- 1 application on skin 2 times a day, As needed, Itching  -- Indication: For skin irritation    furosemide 20 mg oral tablet  -- 1 tab(s) by mouth every other day  -- Indication: For diuretic    ferrous sulfate 325 mg oral tablet  -- 1 tab(s) by mouth once a day  -- Check with your doctor before becoming pregnant.  Do not chew, break, or crush.  May discolor urine or feces.    -- Indication: For Anemia due to blood loss    docusate sodium 100 mg oral capsule  -- 1 cap(s) by mouth 3 times a day  -- Indication: For stool softener    pantoprazole 40 mg oral delayed release tablet  -- 1 tab(s) by mouth once a day  -- Indication: For Antacid    ascorbic acid 500 mg oral tablet  -- 1 tab(s) by mouth once a day  -- Indication: For Anemia due to blood loss    folic acid 1 mg oral tablet  -- 1 tab(s) by mouth once a day  -- Indication: For Anemia due to blood loss

## 2017-08-26 NOTE — PROGRESS NOTE ADULT - ASSESSMENT
67M NICM EF 20%, s/p LVAD 6/12/2017, hx of GIB/AVM's who presents with symptomatic blood loss anemia, s/p APC of jejunal angioectasias on 8/23/2017. Stable.    #LVAD present -   -resume coumadin. 2mg tonight. goal INR 1.5-2.0  -given pulsatility, SBP goal 100-120  -cont carvedilol 6.25mg BID  -cont PO furosemide 20mg daily  -decrease spironolactone to 12.5mg daily    #NORMAN - resolved. Cr now wnl  -cont lisinopril 2.5mg daily     #blood loss anemia - s/p GI intervention. usually in setting of supratherapeutic INR  -H/H stable. resume AC and monitor

## 2017-08-26 NOTE — DISCHARGE NOTE ADULT - OTHER SIGNIFICANT FINDINGS
VSS  tele: rsr 1st 70-90  midsternal incision cdi heydi  drive line dressing cdi with dressing  lungs clear  LE trace edema    discharge pt home today on coumadin 1 mg as per CHF team and Dr. holcomb

## 2017-08-26 NOTE — DISCHARGE NOTE ADULT - PLAN OF CARE
complete recovery shower daily  weigh yourself daily  continue current prescriptions as ordered  increase activity as tolerated   no added salt; low fat; low cholesterol, low salt diet.   follow up with Cardiologist in 1-2 weeks. Call to schedule appointment.  follow up with cardiac surgeon weigh yourself daily  continue current prescriptions as ordered  increase activity as tolerated   no added salt; low salt diet.   follow up with in LVAD clinic on wed 8/30 at 1:00 pm. call to confirm clinic appointment. [phone]- yessi bloodwork (INR level to be drawn at that time)

## 2017-08-26 NOTE — DISCHARGE NOTE ADULT - PATIENT PORTAL LINK FT
“You can access the [facility] Patient Portal, offered by [facility], by registering with the following website: [email or web address]”

## 2017-08-26 NOTE — DISCHARGE NOTE ADULT - NSFTFSERV1RD_GEN_ALL_CORE
wound care and assessment/other:/observation and assessment/teaching and training/medication teaching and assessment

## 2017-08-26 NOTE — PROGRESS NOTE ADULT - SUBJECTIVE AND OBJECTIVE BOX
im ok    VITAL SIGNS    Telemetry:   S1 S2 , RRR     Vital Signs Last 24 Hrs  T(C): 36.5 (17 @ 10:00), Max: 36.7 (17 @ 02:00)  T(F): 97.7 (17 @ 10:00), Max: 98.1 (17 @ 02:00)  HR: 80 (17 @ 10:00) (73 - 95)  BP: 91/67 (17 @ 10:00) (85/64 - 102/91)  RR: 18 (17 @ 10:00) (17 - 18)  SpO2: 100% (17 @ 10:00) (97% - 100%)                    @ 07:01  -   @ 07:00  --------------------------------------------------------  IN: 890 mL / OUT: 1200 mL / NET: -310 mL     @ 07:01  -   @ 11:28  --------------------------------------------------------  IN: 360 mL / OUT: 400 mL / NET: -40 mL          Daily     Daily Weight in k.2 (26 Aug 2017 07:00)        CAPILLARY BLOOD GLUCOSE                    Coumadin    [x ] YES          [  ]      NO         Reason:     lvad                          PHYSICAL EXAM        Neurology: alert and oriented x 3, nonfocal, no gross deficits    CV : S1 S2 , RRR     Sternal Wound :  CDI , Stable    Lungs: cta    Abdomen: soft, nontender, nondistended, positive bowel sounds, driveline dressing cdi    :       voiding        Extremities:         -milena, negative calve tenderness,             mexiletine 150 milliGRAM(s) Oral every 8 hours  amiodarone    Tablet 200 milliGRAM(s) Oral daily  pantoprazole  Injectable 40 milliGRAM(s) IV Push every 12 hours  ferrous    sulfate 325 milliGRAM(s) Oral daily  folic acid 1 milliGRAM(s) Oral daily  ascorbic acid 500 milliGRAM(s) Oral daily  docusate sodium 100 milliGRAM(s) Oral three times a day  carvedilol 6.25 milliGRAM(s) Oral every 12 hours  QUEtiapine 50 milliGRAM(s) Oral at bedtime  spironolactone 25 milliGRAM(s) Oral daily  sodium chloride 0.9% lock flush 3 milliLiter(s) IV Push every 8 hours  acetaminophen   Tablet. 650 milliGRAM(s) Oral every 6 hours PRN  furosemide    Tablet 20 milliGRAM(s) Oral every other day  warfarin 2 milliGRAM(s) Oral once                                Discussed with Cardiothoracic Team at AM rounds. im ok    VITAL SIGNS    Telemetry:  nsr 60    Vital Signs Last 24 Hrs  T(C): 36.5 (17 @ 10:00), Max: 36.7 (17 @ 02:00)  T(F): 97.7 (17 @ 10:00), Max: 98.1 (17 @ 02:00)  HR: 80 (17 @ 10:00) (73 - 95)  BP: 91/67 (17 @ 10:00) (85/64 - 102/91)  RR: 18 (17 @ 10:00) (17 - 18)  SpO2: 100% (17 @ 10:00) (97% - 100%)                    @ 07:01  -   @ 07:00  --------------------------------------------------------  IN: 890 mL / OUT: 1200 mL / NET: -310 mL     @ 07:01  -   @ 11:28  --------------------------------------------------------  IN: 360 mL / OUT: 400 mL / NET: -40 mL          Daily     Daily Weight in k.2 (26 Aug 2017 07:00)        CAPILLARY BLOOD GLUCOSE                    Coumadin    [x ] YES          [  ]      NO         Reason:     lvad                          PHYSICAL EXAM        Neurology: alert and oriented x 3, nonfocal, no gross deficits    CV : S1 S2 , RRR , +hum    Sternal Wound :  CDI , Stable    Lungs: cta    Abdomen: soft, nontender, nondistended, positive bowel sounds, driveline dressing cdi    :       voiding        Extremities:         -milena, negative calve tenderness,             mexiletine 150 milliGRAM(s) Oral every 8 hours  amiodarone    Tablet 200 milliGRAM(s) Oral daily  pantoprazole  Injectable 40 milliGRAM(s) IV Push every 12 hours  ferrous    sulfate 325 milliGRAM(s) Oral daily  folic acid 1 milliGRAM(s) Oral daily  ascorbic acid 500 milliGRAM(s) Oral daily  docusate sodium 100 milliGRAM(s) Oral three times a day  carvedilol 6.25 milliGRAM(s) Oral every 12 hours  QUEtiapine 50 milliGRAM(s) Oral at bedtime  spironolactone 25 milliGRAM(s) Oral daily  sodium chloride 0.9% lock flush 3 milliLiter(s) IV Push every 8 hours  acetaminophen   Tablet. 650 milliGRAM(s) Oral every 6 hours PRN  furosemide    Tablet 20 milliGRAM(s) Oral every other day  warfarin 2 milliGRAM(s) Oral once                                Discussed with Cardiothoracic Team at AM rounds.

## 2017-08-26 NOTE — DISCHARGE NOTE ADULT - NS AS ACTIVITY OBS
Walking-Outdoors allowed/Walking-Indoors allowed/Showering allowed/No Heavy lifting/straining No Heavy lifting/straining/Walking-Outdoors allowed/Showering allowed/Walking-Indoors allowed/Do not drive or operate machinery/Stairs allowed

## 2017-08-26 NOTE — DISCHARGE NOTE ADULT - ADDITIONAL INSTRUCTIONS
follow up with in LVAD clinic on wed 8/30 at 1:00 pm. call to confirm clinic appointment. [phone]- yessi bloodwork (INR level to be drawn at that time)

## 2017-08-26 NOTE — DISCHARGE NOTE ADULT - HOME CARE AGENCY
[facility] [phone] for RN assessment, start of care the day after discharge

## 2017-08-26 NOTE — DISCHARGE NOTE ADULT - CARE PROVIDER_API CALL
Edil Stahl (MD; MPH), Adv Heart Fail [facility] Cardio; Cardiology; Internal Medicine  [address] [place] [address]  Phone: [phone]  Fax: [phone]

## 2017-08-26 NOTE — DISCHARGE NOTE ADULT - REASON FOR ADMISSION
Decreased PO intake, AMS x 1 day 8/21 readmitted with Gi Bleed 8/21 readmitted with Gi Bleed s/p AVM with cautery by GI

## 2017-08-26 NOTE — PROGRESS NOTE ADULT - ASSESSMENT
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  Hospital Course:   CHF following   GI following --> AC therapy on hold for supra therapeutic INR   8/25 INR 1.95 --> Coumadin resumed   Inr at 1.4 , coumadin dosed, will question the need for heparin  with chf

## 2017-08-26 NOTE — DISCHARGE NOTE ADULT - HOSPITAL COURSE
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 Initially hypotensive and given fluids, 2 U PRBC with improvement. Has had 1-2 BMs since admission with no further melena. INR was 4.3 on admission. Repeat CBC appropriately bumped. Underwent enteroscopy and had 3 AVMs APC'ed.On exam, appears well, no significant JVD, nl LVAD hum, no pedal edema. LVAD interrogated with no PI events, at speed 9200. 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 Initially hypotensive and given fluids, 2 U PRBC with improvement. Has had 1-2 BMs since admission with no further melena. INR was 4.3 on admission. Repeat CBC appropriately bumped. Underwent enteroscopy and had 3 AVMs APC'ed.On exam, appears well, no significant JVD, nl LVAD hum, no pedal edema. LVAD interrogated with no PI events, at speed 9200.  coumadin resumed; hold asa as per CHF team and discharge pt home today 8/28  pt to f/u in LVAD clinic wed 8/30 for PT/ INR level

## 2017-08-26 NOTE — PROGRESS NOTE ADULT - SUBJECTIVE AND OBJECTIVE BOX
Heart Failure Progress Note  Fellow Pager [phone]  After 5:00PM or weekends call Cardiology On-Call [facility] [phone]    Interval History:  Pt feels well. No further melena.    Medications:  mexiletine 150 milliGRAM(s) Oral every 8 hours  amiodarone    Tablet 200 milliGRAM(s) Oral daily  pantoprazole  Injectable 40 milliGRAM(s) IV Push every 12 hours  ferrous    sulfate 325 milliGRAM(s) Oral daily  folic acid 1 milliGRAM(s) Oral daily  ascorbic acid 500 milliGRAM(s) Oral daily  docusate sodium 100 milliGRAM(s) Oral three times a day  carvedilol 6.25 milliGRAM(s) Oral every 12 hours  QUEtiapine 50 milliGRAM(s) Oral at bedtime  sodium chloride 0.9% lock flush 3 milliLiter(s) IV Push every 8 hours  acetaminophen   Tablet. 650 milliGRAM(s) Oral every 6 hours PRN  furosemide    Tablet 20 milliGRAM(s) Oral every other day  warfarin 2 milliGRAM(s) Oral once  polyethylene glycol 3350 17 Gram(s) Oral daily  spironolactone 12.5 milliGRAM(s) Oral daily    Vitals:  T(C): 36.5 (17 @ 10:00), Max: 36.7 (17 @ 02:00)  HR: 80 (17 @ 10:00) (73 - 95)  BP: 91/67 (17 @ 10:00) (85/64 - 102/91)  BP(mean): 75 (17 @ 10:00) (71 - 91)  RR: 18 (17 @ 10:00) (17 - 18)  SpO2: 100% (17 @ 10:00) (97% - 100%)    Daily Weight in k.2 (26 Aug 2017 07:00)    I&O's Summary    25 Aug 2017 07:01  -  26 Aug 2017 07:00  --------------------------------------------------------  IN: 890 mL / OUT: 1200 mL / NET: -310 mL    26 Aug 2017 07:01  -  26 Aug 2017 12:07  --------------------------------------------------------  IN: 360 mL / OUT: 400 mL / NET: -40 mL    Physical Exam:  Appearance: No distress  HEENT:   mmm  Cardiovascular: typical LVAD hum present  Respiratory: Lungs clear to auscultation	  Psychiatry: A & O x 3, Mood & affect appropriate  Gastrointestinal:  soft nt nd	  Skin: No cyanosis	  Neurologic: grossly non-focal  Extremities: no cyanosis    LVAD Interrogation: Heart Mate II  Pump Flow: 5.2  Pump Speed: 9200  Pulse Index: 6.8  Pump Power: 5.5  VAD Events: no events  Driveline evaluation:  intact  Programming Changes: No changes made    Labs:                        8.7    9.5   )-----------( 268      ( 26 Aug 2017 05:45 )             26.1         135  |  104  |  25<H>  ----------------------------<  86  4.4   |  19<L>  |  1.25    Ca    8.9      26 Aug 2017 05:46      PT/INR - ( 26 Aug 2017 05:46 )   PT: 15.3 sec;   INR: 1.41 ratio    PTT - ( 25 Aug 2017 05:27 )  PTT:33.1 sec    Lactate Dehydrogenase, Serum: 248 U/L ( @ 05:27)    TELEMETRY: SR 70s-90s

## 2017-08-26 NOTE — DISCHARGE NOTE ADULT - CARE PLAN
Principal Discharge DX:	Gastrointestinal hemorrhage associated with gastritis, unspecified gastritis type  Goal:	complete recovery  Instructions for follow-up, activity and diet:	shower daily  weigh yourself daily  continue current prescriptions as ordered  increase activity as tolerated   no added salt; low fat; low cholesterol, low salt diet.   follow up with Cardiologist in 1-2 weeks. Call to schedule appointment.  follow up with cardiac surgeon Principal Discharge DX:	Gastrointestinal hemorrhage associated with gastritis, unspecified gastritis type  Goal:	complete recovery  Instructions for follow-up, activity and diet:	weigh yourself daily  continue current prescriptions as ordered  increase activity as tolerated   no added salt; low salt diet.   follow up with in LVAD clinic on wed 8/30 at 1:00 pm. call to confirm clinic appointment. [phone]- coumarden bloodwork (INR level to be drawn at that time) Principal Discharge DX:	Gastrointestinal hemorrhage associated with gastritis, unspecified gastritis type  Goal:	complete recovery  Instructions for follow-up, activity and diet:	weigh yourself daily  continue current prescriptions as ordered  increase activity as tolerated   no added salt; low salt diet.   follow up with in LVAD clinic on wed 8/30 at 1:00 pm. call to confirm clinic appointment. [phone]- coumarden bloodwork (INR level to be drawn at that time)

## 2017-08-26 NOTE — DISCHARGE NOTE ADULT - MEDICATION SUMMARY - MEDICATIONS TO STOP TAKING
I will STOP taking the medications listed below when I get home from the hospital:    lisinopril 2.5 mg oral tablet  -- 1 tab(s) by mouth once a day    warfarin 3 mg oral tablet  -- 1 tab(s) by mouth once a day (at bedtime)    colchicine 0.6 mg oral tablet  -- 1 tab(s) by mouth once a day    diphenhydrAMINE 25 mg oral capsule  -- 1 cap(s) by mouth every 4 hours, As needed, Rash and/or Itching    furosemide 20 mg oral tablet  -- 1 tab(s) by mouth once a day    spironolactone 25 mg oral tablet  -- 1 tab(s) by mouth once a day

## 2017-08-27 LAB
ANION GAP SERPL CALC-SCNC: 11 MMOL/L — SIGNIFICANT CHANGE UP (ref 5–17)
BUN SERPL-MCNC: 24 MG/DL — HIGH (ref 7–23)
CALCIUM SERPL-MCNC: 8.8 MG/DL — SIGNIFICANT CHANGE UP (ref 8.4–10.5)
CHLORIDE SERPL-SCNC: 102 MMOL/L — SIGNIFICANT CHANGE UP (ref 96–108)
CO2 SERPL-SCNC: 21 MMOL/L — LOW (ref 22–31)
CREAT SERPL-MCNC: 1.13 MG/DL — SIGNIFICANT CHANGE UP (ref 0.5–1.3)
GLUCOSE SERPL-MCNC: 119 MG/DL — HIGH (ref 70–99)
HAPTOGLOB SERPL-MCNC: 98 MG/DL — SIGNIFICANT CHANGE UP (ref 34–200)
HCT VFR BLD CALC: 26.8 % — LOW (ref 39–50)
HGB BLD-MCNC: 9.1 G/DL — LOW (ref 13–17)
INR BLD: 1.5 RATIO — HIGH (ref 0.88–1.16)
LDH SERPL L TO P-CCNC: 255 U/L — HIGH (ref 50–242)
MAGNESIUM SERPL-MCNC: 1.9 MG/DL — SIGNIFICANT CHANGE UP (ref 1.6–2.6)
MCHC RBC-ENTMCNC: 31.4 PG — SIGNIFICANT CHANGE UP (ref 27–34)
MCHC RBC-ENTMCNC: 33.9 GM/DL — SIGNIFICANT CHANGE UP (ref 32–36)
MCV RBC AUTO: 92.8 FL — SIGNIFICANT CHANGE UP (ref 80–100)
PLATELET # BLD AUTO: 273 K/UL — SIGNIFICANT CHANGE UP (ref 150–400)
POTASSIUM SERPL-MCNC: 4.7 MMOL/L — SIGNIFICANT CHANGE UP (ref 3.5–5.3)
POTASSIUM SERPL-SCNC: 4.7 MMOL/L — SIGNIFICANT CHANGE UP (ref 3.5–5.3)
PROTHROM AB SERPL-ACNC: 16.5 SEC — HIGH (ref 9.8–12.7)
RBC # BLD: 2.89 M/UL — LOW (ref 4.2–5.8)
RBC # FLD: 17.8 % — HIGH (ref 10.3–14.5)
SODIUM SERPL-SCNC: 134 MMOL/L — LOW (ref 135–145)
WBC # BLD: 11.6 K/UL — HIGH (ref 3.8–10.5)
WBC # FLD AUTO: 11.6 K/UL — HIGH (ref 3.8–10.5)

## 2017-08-27 PROCEDURE — 93750 INTERROGATION VAD IN PERSON: CPT

## 2017-08-27 PROCEDURE — 99233 SBSQ HOSP IP/OBS HIGH 50: CPT | Mod: 25

## 2017-08-27 RX ORDER — WARFARIN SODIUM 2.5 MG/1
1 TABLET ORAL ONCE
Qty: 0 | Refills: 0 | Status: COMPLETED | OUTPATIENT
Start: 2017-08-27 | End: 2017-08-27

## 2017-08-27 RX ORDER — SORBITOL SOLUTION 70 %
30 SOLUTION, ORAL MISCELLANEOUS ONCE
Qty: 0 | Refills: 0 | Status: COMPLETED | OUTPATIENT
Start: 2017-08-27 | End: 2017-08-27

## 2017-08-27 RX ORDER — MAGNESIUM SULFATE 500 MG/ML
2 VIAL (ML) INJECTION ONCE
Qty: 0 | Refills: 0 | Status: COMPLETED | OUTPATIENT
Start: 2017-08-27 | End: 2017-08-27

## 2017-08-27 RX ORDER — HYDROCORTISONE 1 %
1 OINTMENT (GRAM) TOPICAL
Qty: 0 | Refills: 0 | Status: DISCONTINUED | OUTPATIENT
Start: 2017-08-27 | End: 2017-08-28

## 2017-08-27 RX ADMIN — SODIUM CHLORIDE 3 MILLILITER(S): 9 INJECTION INTRAMUSCULAR; INTRAVENOUS; SUBCUTANEOUS at 14:01

## 2017-08-27 RX ADMIN — Medication 325 MILLIGRAM(S): at 13:54

## 2017-08-27 RX ADMIN — Medication 50 GRAM(S): at 02:40

## 2017-08-27 RX ADMIN — PANTOPRAZOLE SODIUM 40 MILLIGRAM(S): 20 TABLET, DELAYED RELEASE ORAL at 17:39

## 2017-08-27 RX ADMIN — Medication 1 MILLIGRAM(S): at 13:57

## 2017-08-27 RX ADMIN — SODIUM CHLORIDE 3 MILLILITER(S): 9 INJECTION INTRAMUSCULAR; INTRAVENOUS; SUBCUTANEOUS at 05:40

## 2017-08-27 RX ADMIN — CARVEDILOL PHOSPHATE 6.25 MILLIGRAM(S): 80 CAPSULE, EXTENDED RELEASE ORAL at 05:37

## 2017-08-27 RX ADMIN — Medication 100 MILLIGRAM(S): at 05:37

## 2017-08-27 RX ADMIN — POLYETHYLENE GLYCOL 3350 17 GRAM(S): 17 POWDER, FOR SOLUTION ORAL at 13:55

## 2017-08-27 RX ADMIN — SODIUM CHLORIDE 3 MILLILITER(S): 9 INJECTION INTRAMUSCULAR; INTRAVENOUS; SUBCUTANEOUS at 21:18

## 2017-08-27 RX ADMIN — MEXILETINE HYDROCHLORIDE 150 MILLIGRAM(S): 150 CAPSULE ORAL at 05:37

## 2017-08-27 RX ADMIN — MEXILETINE HYDROCHLORIDE 150 MILLIGRAM(S): 150 CAPSULE ORAL at 21:15

## 2017-08-27 RX ADMIN — Medication 100 MILLIGRAM(S): at 21:15

## 2017-08-27 RX ADMIN — Medication 1 APPLICATION(S): at 19:59

## 2017-08-27 RX ADMIN — Medication 100 MILLIGRAM(S): at 13:54

## 2017-08-27 RX ADMIN — Medication 30 MILLILITER(S): at 19:05

## 2017-08-27 RX ADMIN — AMIODARONE HYDROCHLORIDE 200 MILLIGRAM(S): 400 TABLET ORAL at 05:37

## 2017-08-27 RX ADMIN — QUETIAPINE FUMARATE 50 MILLIGRAM(S): 200 TABLET, FILM COATED ORAL at 21:16

## 2017-08-27 RX ADMIN — WARFARIN SODIUM 1 MILLIGRAM(S): 2.5 TABLET ORAL at 21:16

## 2017-08-27 RX ADMIN — SPIRONOLACTONE 12.5 MILLIGRAM(S): 25 TABLET, FILM COATED ORAL at 05:37

## 2017-08-27 RX ADMIN — CARVEDILOL PHOSPHATE 6.25 MILLIGRAM(S): 80 CAPSULE, EXTENDED RELEASE ORAL at 17:39

## 2017-08-27 RX ADMIN — Medication 500 MILLIGRAM(S): at 13:54

## 2017-08-27 RX ADMIN — PANTOPRAZOLE SODIUM 40 MILLIGRAM(S): 20 TABLET, DELAYED RELEASE ORAL at 05:38

## 2017-08-27 RX ADMIN — MEXILETINE HYDROCHLORIDE 150 MILLIGRAM(S): 150 CAPSULE ORAL at 13:54

## 2017-08-27 NOTE — PROGRESS NOTE ADULT - ASSESSMENT
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 Initially hypotensive and given fluids, 2 U PRBC with improvement. Has had 1-2 BMs since admission with no further melena. INR was 4.3 on admission. Repeat CBC appropriately bumped. Underwent enteroscopy and had 3 AVMs APC'ed.On exam, appears well, no significant JVD, nl LVAD hum, no pedal edema. LVAD interrogated with no PI events, at speed 9200. Labs stable.   - appreciate GI c/s  - monitor for bleed; ensure adequate access  - give 1 mg coumadin tongight; once INR goal 1.5-2, can be discharged  - continue lasix 20 mg every other day  - asymptomatic VT; continue coreg 6.25 mg bid; if persists, can increase if BP tolerates  - titrate BP to systolics 100-120 given pulsatility

## 2017-08-27 NOTE — CHART NOTE - NSCHARTNOTEFT_GEN_A_CORE
coumadin 1mg po given as per Dr. Viramontes  Pt. w/o complaints at this time  hct stable - 26.8  WCT overnight- as per  - will maintain Coreg 6.25mg po bid

## 2017-08-27 NOTE — PROGRESS NOTE ADULT - ASSESSMENT
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    Hospital Course:   CHF following   GI following --> AC therapy on hold for supra therapeutic INR   8/25 INR 1.95 --> Coumadin resumed   Inr at 1.4 , coumadin dosed, will question the need for heparin  with chf  8/27: Runs of WCT 12 beats, 4 beats, 22 beats magnesium 2g administered

## 2017-08-27 NOTE — INPATIENT CERTIFICATION FOR MEDICARE PATIENTS - RISKS OF ADVERSE EVENTS
Concern for renal deterioration/Concern for cardiopulmonary deterioration/Concern for neurologic deterioration

## 2017-08-27 NOTE — PROGRESS NOTE ADULT - SUBJECTIVE AND OBJECTIVE BOX
Interval History:  - doing well  - hasn't had BM in 2 days; H/H stable  - had 22 beats of VT at 2300, asymptomatic; no LVAD alarms    Medications:  mexiletine 150 milliGRAM(s) Oral every 8 hours  amiodarone    Tablet 200 milliGRAM(s) Oral daily  pantoprazole  Injectable 40 milliGRAM(s) IV Push every 12 hours  ferrous    sulfate 325 milliGRAM(s) Oral daily  folic acid 1 milliGRAM(s) Oral daily  ascorbic acid 500 milliGRAM(s) Oral daily  docusate sodium 100 milliGRAM(s) Oral three times a day  carvedilol 6.25 milliGRAM(s) Oral every 12 hours  QUEtiapine 50 milliGRAM(s) Oral at bedtime  sodium chloride 0.9% lock flush 3 milliLiter(s) IV Push every 8 hours  acetaminophen   Tablet. 650 milliGRAM(s) Oral every 6 hours PRN  furosemide    Tablet 20 milliGRAM(s) Oral every other day  polyethylene glycol 3350 17 Gram(s) Oral daily  spironolactone 12.5 milliGRAM(s) Oral daily  warfarin 1 milliGRAM(s) Oral once      Vitals:  T(C): 36.4 (17 @ 13:30), Max: 36.6 (17 @ 05:34)  HR: 76 (17 @ 13:30) (65 - 90)  BP: 92/75 (17 @ 13:30) (92/75 - 110/76)  BP(mean): 80 (17 @ 13:30) (79 - 88)  ABP: --  ABP(mean): --  RR: 18 (17 @ 13:30) (18 - 18)  SpO2: 100% (17 @ 13:30) (99% - 100%)    Daily     Daily Weight in k.8 (27 Aug 2017 08:34)    I&O's Summary    26 Aug 2017 07:01  -  27 Aug 2017 07:00  --------------------------------------------------------  IN: 1190 mL / OUT: 1900 mL / NET: -710 mL    27 Aug 2017 07:01  -  27 Aug 2017 16:32  --------------------------------------------------------  IN: 740 mL / OUT: 750 mL / NET: -10 mL        Physical Exam:  Appearance: No Acute Distress  HEENT: No JVD  Cardiovascular: Normal S1 S2, No murmurs/rubs/gallops. Nl LVAD hum  Respiratory: Clear to auscultation bilaterally  Gastrointestinal: Soft, Non-tender	  Skin: No cyanosis	  Neurologic: Non-focal  Extremities: No LE edema  Psychiatry: A & O x 3, Mood & affect appropriate    LVAD Interrogation:  Pump Flow: 3.9  Pump Speed: 9200  Pulse Index: 6.4  Pump Power: 4.9  VAD Events: None  Driveline evaluation: no drainage  Programming Changes: No changes made    Labs:                        9.1    11.6  )-----------( 273      ( 27 Aug 2017 00:48 )             26.8         134<L>  |  102  |  24<H>  ----------------------------<  119<H>  4.7   |  21<L>  |  1.13    Ca    8.8      27 Aug 2017 00:48  Mg     1.9           PT/INR - ( 27 Aug 2017 00:48 )   PT: 16.5 sec;   INR: 1.50 ratio      Lactate Dehydrogenase, Serum: 255 U/L ( @ 00:48)  Lactate Dehydrogenase, Serum: 248 U/L ( @ 05:27)    TELEMETRY:  22 bats of VT

## 2017-08-27 NOTE — PROGRESS NOTE ADULT - SUBJECTIVE AND OBJECTIVE BOX
VITAL SIGNS    Telemetry:  SR burst of 12 beats WCT, 4 beats WCT, 22 beats WCT    T(F): 97.5   HR: 86   BP: 96/73   RR: 18   SpO2: 100%            08-26 @ 07:01  -  08-27 @ 05:25  --------------------------------------------------------  IN: 1140 mL / OUT: 1900 mL / NET: -760 mL          Coumadin    [x ] YES          [  ]      NO         Reason:                           PHYSICAL EXAM    Neurology: alert and oriented x 3, nonfocal, no gross deficits    CV :LVAD hum    Sternal Wound :  CDI , Stable    Lungs:decreased bilaterally    Abdomen: soft, nontender, nondistended, positive bowel sounds, last bowel movement 8/26    :voids        Extremities:     bilateral +1 lower extremity edema          mexiletine 150 milliGRAM(s) Oral every 8 hours  amiodarone    Tablet 200 milliGRAM(s) Oral daily  pantoprazole  Injectable 40 milliGRAM(s) IV Push every 12 hours  ferrous    sulfate 325 milliGRAM(s) Oral daily  folic acid 1 milliGRAM(s) Oral daily  ascorbic acid 500 milliGRAM(s) Oral daily  docusate sodium 100 milliGRAM(s) Oral three times a day  carvedilol 6.25 milliGRAM(s) Oral every 12 hours  QUEtiapine 50 milliGRAM(s) Oral at bedtime  sodium chloride 0.9% lock flush 3 milliLiter(s) IV Push every 8 hours  acetaminophen   Tablet. 650 milliGRAM(s) Oral every 6 hours PRN  furosemide    Tablet 20 milliGRAM(s) Oral every other day  polyethylene glycol 3350 17 Gram(s) Oral daily  spironolactone 12.5 milliGRAM(s) Oral daily                            .

## 2017-08-28 VITALS — RESPIRATION RATE: 18 BRPM | TEMPERATURE: 98 F | OXYGEN SATURATION: 97 % | HEART RATE: 82 BPM

## 2017-08-28 DIAGNOSIS — I47.2 VENTRICULAR TACHYCARDIA: ICD-10-CM

## 2017-08-28 DIAGNOSIS — K92.2 GASTROINTESTINAL HEMORRHAGE, UNSPECIFIED: ICD-10-CM

## 2017-08-28 DIAGNOSIS — I50.20 UNSPECIFIED SYSTOLIC (CONGESTIVE) HEART FAILURE: ICD-10-CM

## 2017-08-28 LAB
ANION GAP SERPL CALC-SCNC: 13 MMOL/L — SIGNIFICANT CHANGE UP (ref 5–17)
BUN SERPL-MCNC: 27 MG/DL — HIGH (ref 7–23)
CALCIUM SERPL-MCNC: 9.1 MG/DL — SIGNIFICANT CHANGE UP (ref 8.4–10.5)
CHLORIDE SERPL-SCNC: 100 MMOL/L — SIGNIFICANT CHANGE UP (ref 96–108)
CO2 SERPL-SCNC: 22 MMOL/L — SIGNIFICANT CHANGE UP (ref 22–31)
CREAT SERPL-MCNC: 1.24 MG/DL — SIGNIFICANT CHANGE UP (ref 0.5–1.3)
GLUCOSE SERPL-MCNC: 107 MG/DL — HIGH (ref 70–99)
HCT VFR BLD CALC: 27.2 % — LOW (ref 39–50)
HGB BLD-MCNC: 8.7 G/DL — LOW (ref 13–17)
INR BLD: 1.33 RATIO — HIGH (ref 0.88–1.16)
MCHC RBC-ENTMCNC: 30 PG — SIGNIFICANT CHANGE UP (ref 27–34)
MCHC RBC-ENTMCNC: 32 GM/DL — SIGNIFICANT CHANGE UP (ref 32–36)
MCV RBC AUTO: 93.7 FL — SIGNIFICANT CHANGE UP (ref 80–100)
PLATELET # BLD AUTO: 324 K/UL — SIGNIFICANT CHANGE UP (ref 150–400)
POTASSIUM SERPL-MCNC: 4.8 MMOL/L — SIGNIFICANT CHANGE UP (ref 3.5–5.3)
POTASSIUM SERPL-SCNC: 4.8 MMOL/L — SIGNIFICANT CHANGE UP (ref 3.5–5.3)
PROTHROM AB SERPL-ACNC: 14.4 SEC — HIGH (ref 9.8–12.7)
RBC # BLD: 2.91 M/UL — LOW (ref 4.2–5.8)
RBC # FLD: 18.5 % — HIGH (ref 10.3–14.5)
SODIUM SERPL-SCNC: 135 MMOL/L — SIGNIFICANT CHANGE UP (ref 135–145)
WBC # BLD: 12.9 K/UL — HIGH (ref 3.8–10.5)
WBC # FLD AUTO: 12.9 K/UL — HIGH (ref 3.8–10.5)

## 2017-08-28 PROCEDURE — 83615 LACTATE (LD) (LDH) ENZYME: CPT

## 2017-08-28 PROCEDURE — 82272 OCCULT BLD FECES 1-3 TESTS: CPT

## 2017-08-28 PROCEDURE — 83735 ASSAY OF MAGNESIUM: CPT

## 2017-08-28 PROCEDURE — P9045: CPT

## 2017-08-28 PROCEDURE — 86901 BLOOD TYPING SEROLOGIC RH(D): CPT

## 2017-08-28 PROCEDURE — 80053 COMPREHEN METABOLIC PANEL: CPT

## 2017-08-28 PROCEDURE — 99233 SBSQ HOSP IP/OBS HIGH 50: CPT | Mod: 25

## 2017-08-28 PROCEDURE — 93750 INTERROGATION VAD IN PERSON: CPT

## 2017-08-28 PROCEDURE — 70450 CT HEAD/BRAIN W/O DYE: CPT

## 2017-08-28 PROCEDURE — 36430 TRANSFUSION BLD/BLD COMPNT: CPT

## 2017-08-28 PROCEDURE — 84295 ASSAY OF SERUM SODIUM: CPT

## 2017-08-28 PROCEDURE — 84132 ASSAY OF SERUM POTASSIUM: CPT

## 2017-08-28 PROCEDURE — 99285 EMERGENCY DEPT VISIT HI MDM: CPT

## 2017-08-28 PROCEDURE — 84443 ASSAY THYROID STIM HORMONE: CPT

## 2017-08-28 PROCEDURE — P9016: CPT

## 2017-08-28 PROCEDURE — 82435 ASSAY OF BLOOD CHLORIDE: CPT

## 2017-08-28 PROCEDURE — 90832 PSYTX W PT 30 MINUTES: CPT

## 2017-08-28 PROCEDURE — 80048 BASIC METABOLIC PNL TOTAL CA: CPT

## 2017-08-28 PROCEDURE — 86923 COMPATIBILITY TEST ELECTRIC: CPT

## 2017-08-28 PROCEDURE — 85730 THROMBOPLASTIN TIME PARTIAL: CPT

## 2017-08-28 PROCEDURE — 85610 PROTHROMBIN TIME: CPT

## 2017-08-28 PROCEDURE — 85027 COMPLETE CBC AUTOMATED: CPT

## 2017-08-28 PROCEDURE — 86850 RBC ANTIBODY SCREEN: CPT

## 2017-08-28 PROCEDURE — 84100 ASSAY OF PHOSPHORUS: CPT

## 2017-08-28 PROCEDURE — 82330 ASSAY OF CALCIUM: CPT

## 2017-08-28 PROCEDURE — 82947 ASSAY GLUCOSE BLOOD QUANT: CPT

## 2017-08-28 PROCEDURE — 82803 BLOOD GASES ANY COMBINATION: CPT

## 2017-08-28 PROCEDURE — 71045 X-RAY EXAM CHEST 1 VIEW: CPT

## 2017-08-28 PROCEDURE — 99239 HOSP IP/OBS DSCHRG MGMT >30: CPT

## 2017-08-28 PROCEDURE — 83605 ASSAY OF LACTIC ACID: CPT

## 2017-08-28 PROCEDURE — 83010 ASSAY OF HAPTOGLOBIN QUANT: CPT

## 2017-08-28 PROCEDURE — 85014 HEMATOCRIT: CPT

## 2017-08-28 PROCEDURE — 86900 BLOOD TYPING SEROLOGIC ABO: CPT

## 2017-08-28 RX ORDER — FERROUS SULFATE 325(65) MG
1 TABLET ORAL
Qty: 30 | Refills: 0 | OUTPATIENT
Start: 2017-08-28 | End: 2017-09-27

## 2017-08-28 RX ORDER — DOCUSATE SODIUM 100 MG
1 CAPSULE ORAL
Qty: 0 | Refills: 0 | COMMUNITY
Start: 2017-08-28

## 2017-08-28 RX ORDER — CARVEDILOL PHOSPHATE 80 MG/1
1 CAPSULE, EXTENDED RELEASE ORAL
Qty: 60 | Refills: 0 | OUTPATIENT
Start: 2017-08-28 | End: 2017-09-27

## 2017-08-28 RX ORDER — WARFARIN SODIUM 2.5 MG/1
1 TABLET ORAL
Qty: 30 | Refills: 0 | OUTPATIENT
Start: 2017-08-28 | End: 2017-09-27

## 2017-08-28 RX ORDER — ACETAMINOPHEN 500 MG
2 TABLET ORAL
Qty: 0 | Refills: 0 | COMMUNITY
Start: 2017-08-28

## 2017-08-28 RX ORDER — HYDROCORTISONE 1 %
1 OINTMENT (GRAM) TOPICAL
Qty: 0 | Refills: 0 | COMMUNITY
Start: 2017-08-28

## 2017-08-28 RX ORDER — SPIRONOLACTONE 25 MG/1
0.5 TABLET, FILM COATED ORAL
Qty: 15 | Refills: 0 | OUTPATIENT
Start: 2017-08-28 | End: 2017-09-27

## 2017-08-28 RX ORDER — AMIODARONE HYDROCHLORIDE 400 MG/1
1 TABLET ORAL
Qty: 0 | Refills: 0 | COMMUNITY
Start: 2017-08-28

## 2017-08-28 RX ORDER — MEXILETINE HYDROCHLORIDE 150 MG/1
1 CAPSULE ORAL
Qty: 0 | Refills: 0 | COMMUNITY
Start: 2017-08-28

## 2017-08-28 RX ORDER — FUROSEMIDE 40 MG
1 TABLET ORAL
Qty: 8 | Refills: 0 | OUTPATIENT
Start: 2017-08-28 | End: 2017-09-12

## 2017-08-28 RX ORDER — ASCORBIC ACID 60 MG
1 TABLET,CHEWABLE ORAL
Qty: 0 | Refills: 0 | COMMUNITY
Start: 2017-08-28

## 2017-08-28 RX ORDER — QUETIAPINE FUMARATE 200 MG/1
1 TABLET, FILM COATED ORAL
Qty: 0 | Refills: 0 | COMMUNITY
Start: 2017-08-28

## 2017-08-28 RX ORDER — FOLIC ACID 0.8 MG
1 TABLET ORAL
Qty: 0 | Refills: 0 | COMMUNITY
Start: 2017-08-28

## 2017-08-28 RX ADMIN — MEXILETINE HYDROCHLORIDE 150 MILLIGRAM(S): 150 CAPSULE ORAL at 14:22

## 2017-08-28 RX ADMIN — Medication 1 APPLICATION(S): at 06:03

## 2017-08-28 RX ADMIN — Medication 1 APPLICATION(S): at 11:13

## 2017-08-28 RX ADMIN — Medication 100 MILLIGRAM(S): at 06:02

## 2017-08-28 RX ADMIN — Medication 20 MILLIGRAM(S): at 11:12

## 2017-08-28 RX ADMIN — CARVEDILOL PHOSPHATE 6.25 MILLIGRAM(S): 80 CAPSULE, EXTENDED RELEASE ORAL at 06:02

## 2017-08-28 RX ADMIN — Medication 325 MILLIGRAM(S): at 11:13

## 2017-08-28 RX ADMIN — Medication 1 MILLIGRAM(S): at 11:12

## 2017-08-28 RX ADMIN — Medication 500 MILLIGRAM(S): at 11:12

## 2017-08-28 RX ADMIN — SODIUM CHLORIDE 3 MILLILITER(S): 9 INJECTION INTRAMUSCULAR; INTRAVENOUS; SUBCUTANEOUS at 06:08

## 2017-08-28 RX ADMIN — POLYETHYLENE GLYCOL 3350 17 GRAM(S): 17 POWDER, FOR SOLUTION ORAL at 11:14

## 2017-08-28 RX ADMIN — MEXILETINE HYDROCHLORIDE 150 MILLIGRAM(S): 150 CAPSULE ORAL at 06:02

## 2017-08-28 RX ADMIN — PANTOPRAZOLE SODIUM 40 MILLIGRAM(S): 20 TABLET, DELAYED RELEASE ORAL at 06:02

## 2017-08-28 RX ADMIN — AMIODARONE HYDROCHLORIDE 200 MILLIGRAM(S): 400 TABLET ORAL at 06:02

## 2017-08-28 RX ADMIN — SPIRONOLACTONE 12.5 MILLIGRAM(S): 25 TABLET, FILM COATED ORAL at 06:02

## 2017-08-28 RX ADMIN — Medication 100 MILLIGRAM(S): at 14:22

## 2017-08-28 NOTE — PROGRESS NOTE ADULT - NSHPATTENDINGPLANDISCUSS_GEN_ALL_CORE
patient and care team
LVAD Team
LVAD coordinator
patient and care team
Dr. Mcbride/CTS am rounds
patient and care team
2 Gonzalo NP team.

## 2017-08-28 NOTE — PROGRESS NOTE ADULT - ATTENDING COMMENTS
Marga Martines, PhD  [phone]
Marga Martines, PhD  [phone]
As above.  s/p endoscopic treatment of jejunal angioectasias  Will sign off/call for questions/concerns.
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 Initially hypotensive and given fluids, 2 U PRBC with improvement. Has had 1-2 BMs since admission with no further melena. INR was 4.3 on admission. Repeat CBC appropriately bumped. On exam, appears well, JVD elevated, nl LVAD hum, no pedal edema. LVAD interrogated with PI in 7-8 range w/o alarms, at speed 9200. Labs also notabe for BUN/Cr 57/1.22 (improved from admisson; baseline 16/1.12).   - appreciate GI c/s; video capsule in place; possible EGD tomorrow  - monitor for bleed; ensure adequate access  - continue holding coumadin; once stable, will aim for INR goal 1.5-2 given h/o bleeding  - resume home diuretics  - titrate BP to systolics 100-120 given pulsatility; opening doppler pressure correlated with systolic arterial pressure and cuff pressure
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 Initially hypotensive and given fluids, 2 U PRBC with improvement. Has had 1-2 BMs since admission with no further melena. INR was 4.3 on admission. Repeat CBC appropriately bumped. On exam, appears well, JVD improved, nl LVAD hum, no pedal edema. LVAD interrogated with no PI events, at speed 9200. Labs with improved BUN/Cr 40/1.01. Underwent enteroscopy and had 3 AVMs APC'ed.   - appreciate GI c/s  - monitor for bleed; ensure adequate access  - continue holding coumadin; once stable, will aim for INR goal 1.5-2 given h/o bleeding  - continue lasix 20 mg daily  - titrate BP to systolics 100-120 given pulsatility; opening doppler pressure correlated with systolic arterial pressure and cuff pressure
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 Initially hypotensive and given fluids, 2 U PRBC with improvement. Has had 1-2 BMs since admission with no further melena. INR was 4.3 on admission. Repeat CBC appropriately bumped. On exam, appears well, no significant JVD, nl LVAD hum, no pedal edema. LVAD interrogated with no PI events, at speed 9200. Labs with improved BUN/Cr 26/1.01. Underwent enteroscopy and had 3 AVMs APC'ed.   - appreciate GI c/s  - monitor for bleed; ensure adequate access  - continue holding coumadin; once stable, will aim for INR goal 1.5-2 given h/o bleeding; no heparin gtt   - continue lasix 20 mg daily  - titrate BP to systolics 100-120 given pulsatility; opening doppler pressure correlated with systolic arterial pressure and cuff pressure
Briefly, 68 y/o NICM (EF 20%), ACC/AHA Stage D s/p LVAD 6/12/2017, prior GIB 2/2 AVMs s/p clipping/APC who presented to the ED with decreased PO intake, vomiting, and AMS with melena for 2-3 days, found to have Hb 5.8 (from 9.8). Initially hypotensive and given fluids, 2 U PRBC with improvement. Has had 1-2 BMs since admission with no further melena. INR was 4.3 on admission. Repeat CBC appropriately bumped. Underwent enteroscopy and had 3 AVMs APC'ed.On exam, appears well, no significant JVD, nl LVAD hum, no pedal edema. LVAD interrogated with no PI events, at speed 9200. Labs stable.   - appreciate GI c/s  - monitor for bleed; ensure adequate access  - continue coumadin at 3 mg; once INR uptrending appropriately can be discharged; goal 1.5-2  - continue lasix 20 mg every other day  - titrate BP to systolics 100-120 given pulsatility
Marga Martines, PhD  [phone]
Marga Martines, PhD  [phone]
Marga Martines, PhD  [phone]
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 Initially hypotensive and given fluids, 2 U PRBC with improvement. Has had 1-2 BMs since admission with no further melena. INR was 4.3 on admission. Repeat CBC appropriately bumped. Underwent enteroscopy and had 3 AVMs APC'ed.On exam, appears well, no significant JVD, nl LVAD hum, no pedal edema. LVAD interrogated with no PI events, at speed 9200. Labs with slightly worsened BUN/Cr 26/1.4 (from 26/1.01).    - appreciate GI c/s  - monitor for bleed; ensure adequate access  - restart coumadin at 3 mg; once INR uptrending appropriately can be discharged; goal 1.5-2  - reduce lasix to 20 mg every other day  - titrate BP to systolics 100-120 given pulsatility
Clinically doing well and ready for discharge. On TTE he has some aortic valve opening - we will continue current LVAD speed. He will follow-up in clinic on Thursday.

## 2017-08-28 NOTE — PROGRESS NOTE ADULT - ASSESSMENT
Reports he is very happy to be going home today. His friend Tahira will visit this evening to provide support.   Slept well.  Appetite good.  Mood upbeat. Reviewed simple checklist with patient designed to assist with LVAD management at home.   Handout utilizes pictures and simple instructions to accommodate patient’s limited reading skills.   Team has been utilizing this method of teaching since pre-LVAD implant with good results.  Pt able to teach back the information accurately.    Receptive to support and validation. Has good support at home from his 2 brothers (Nawaf and Bladimir) who prepare meals, and his close friend Tahira who assists with LVAD management.  According to pt, his friend Tahira has scheduled for  to deliver healthy meals to his home.          DX:  Systolic heart failure; r/o Adjustment disorder with anxiety/depression     Recommendations:   Behavioral Cardiology will continue to follow

## 2017-08-28 NOTE — PROGRESS NOTE ADULT - SUBJECTIVE AND OBJECTIVE BOX
Behavioral Cardiology Progress Note     HPI:  68 y/o male with advanced heart failure (NICM); s/p LVAD implant (6/12/17), course complicated by GIB found to be 2/2 AVMs s/p APC with push enteroscopy (7/6/17), discharged to [facility] (7/17/17); admitted from rehab with c/o melena x 3 days with suspicion of another GIB, s/p cautery (7/25/17); presented to [facility] ED with decreased PO intake, vomiting, melena, and AMS.  Found to be hypotensive, admitted to CTU for further management; found to be anemic, transfused 3U PRBC’s, seen by GI, underwent enteroscopy and had 3 AVMs APC'ed.    67 year old man with chronic systolic heart failure, ACC/AHA stage D, due to nonischemic cardiomyopathy, s/p LVAD 6/12/17, GIB now presents to [facility] ED with decreased PO intake and altered mental status as per brother, Nawaf, who accompanies him, since Saturday 8/19/17. The patient went to the LVAD clinic for an appt on Friday, 8/18 where he seemed slightly lethargic for which they gave him food and water which seemed to help and he went home. Nawaf reported that the patient is normally very energetic and talkative and has a good appetite, but since Saturday has has decreased PO intake and been much more lethargic than usual as well as having one episode of melena. The patient had 1 episode of emesis on Sunday night, 8/20/17 around 9pm. The patient was found to be guaiac + in the ED. Denies any recent fevers, chills, diarrhea, recent travel, pain at the LVAD driveline site.    Behavioral Health Assessment:     Mood:  "I'm going home today"         Current stressors:   Readmission to hospital   Adjustment to living with LVAD        Support system/family support: Good support from family and close friend      Coping strategies: Talking with family and staff, watching TV; thinking about his granddaughter     Understanding of medical illness and treatment plan:  Good understanding of LVAD management; will benefit from ongoing education.      MSE: Pt seen resting in bed.  A&Ox3.  Well related with good eye contact.  Thought process goal directed.  No abnormal thought content; denies s/i.  Mood upbeat.  Affect full range.  Insight and judgment adequate.

## 2017-08-28 NOTE — PROGRESS NOTE ADULT - PROBLEM SELECTOR PROBLEM 1
GIB (gastrointestinal bleeding)
Acute GI hemorrhage
GIB (gastrointestinal bleeding)

## 2017-08-28 NOTE — PROGRESS NOTE ADULT - PROVIDER SPECIALTY LIST ADULT
CT Surgery
CTU
Critical Care
Gastroenterology
Gastroenterology
Heart Failure
Psychiatry

## 2017-08-28 NOTE — PROGRESS NOTE ADULT - PROBLEM SELECTOR PLAN 2
No evidence of malfunction. We will continue current settings with consideration for decreasing pump speed to minimize recurrence of GI bleeding.

## 2017-08-28 NOTE — PROGRESS NOTE ADULT - PROBLEM SELECTOR PLAN 1
- Continue with diuresis; aldactone 25 mg PO daily; Decrease Lasix 20 mg PO QOD   - Continue with PPI IV BID as per GI   - Hold ASA  - Continue with current medication regimen   -  Coumadin tonight   - Increase activity as tolerated   - Continue with Coreg 6.25 PO BID   - Monitor H&H   - D/C Plan home PT once medically cleared and INR is therapeutic.  Plan of care discussed with attending.
- Continue with diuresis; aldactone 25 mg PO daily; Decrease Lasix 20 mg PO QOD   - Continue with PPI IV BID as per GI   - Hold ASA  - Continue with current medication regimen   -  Coumadin tonight   - Increase activity as tolerated   - Continue with Coreg 6.25 PO BID   - Monitor H&H   - D/C Plan home PT once medically cleared and INR is therapeutic.  Plan of care discussed with attending.
- Continue with diuresis; aldactone 25 mg PO daily; Decrease Lasix 20 mg PO QOD   - Continue with PPI IV BID as per GI   - Hold ASA  - Continue with current medication regimen   - Start Coumadin tonight 3 mg PO   - Increase activity as tolerated   - Continue with Coreg 6.25 PO BID   - Monitor H&H   - D/C Plan home PT once medically cleared and INR is therapeutic.  Plan of care discussed with attending.
Clinically stable with stable hematocrit.   Will discharge on coumadin 1 mg daily with goal INR for next week of < 1.5  Thereafter in the absence of bleeding we will increase INR goal to 1.5-2.0  He will remain off of aspirin
Continue with diuresis; aldactone, lasix  PPI IV bid  Hold asa

## 2017-08-28 NOTE — PROGRESS NOTE ADULT - SUBJECTIVE AND OBJECTIVE BOX
Subjective:    Medications:  mexiletine 150 milliGRAM(s) Oral every 8 hours  amiodarone    Tablet 200 milliGRAM(s) Oral daily  pantoprazole  Injectable 40 milliGRAM(s) IV Push every 12 hours  ferrous    sulfate 325 milliGRAM(s) Oral daily  folic acid 1 milliGRAM(s) Oral daily  ascorbic acid 500 milliGRAM(s) Oral daily  docusate sodium 100 milliGRAM(s) Oral three times a day  carvedilol 6.25 milliGRAM(s) Oral every 12 hours  QUEtiapine 50 milliGRAM(s) Oral at bedtime  sodium chloride 0.9% lock flush 3 milliLiter(s) IV Push every 8 hours  acetaminophen   Tablet. 650 milliGRAM(s) Oral every 6 hours PRN  furosemide    Tablet 20 milliGRAM(s) Oral every other day  polyethylene glycol 3350 17 Gram(s) Oral daily  spironolactone 12.5 milliGRAM(s) Oral daily  hydrocortisone 0.5% Cream 1 Application(s) Topical two times a day PRN      PHYSICAL EXAM:  Vital Signs Last 24 Hrs  T(C): 36.5 (28 Aug 2017 06:00), Max: 36.8 (27 Aug 2017 17:00)  T(F): 97.7 (28 Aug 2017 06:00), Max: 98.2 (27 Aug 2017 17:00)  HR: 78 (28 Aug 2017 06:00) (76 - 86)  BP: 95/71 (28 Aug 2017 01:54) (91/67 - 106/79)  BP(mean): 78 (28 Aug 2017 06:00) (78 - 88)  RR: 16 (28 Aug 2017 06:00) (16 - 18)  99% (28 Aug 2017 06:00) (99% - 100%)  Daily     Daily Weight in k.8 (27 Aug 2017 08:34)  I&O's Detail    27 Aug 2017 07:01  -  28 Aug 2017 07:00  --------------------------------------------------------  IN:    Oral Fluid: 1320 mL  Total IN: 1320 mL    OUT:    Voided: 1490 mL  Total OUT: 1490 mL    Total NET: -170 mL      General: A/ox 3, No acute Distress  Neck: Supple,  JVD  Cardiac: S1 S2, No M/R/G  Pulmonary: CTAB, Breathing unlabored, No Rhonchi/Rales/Wheezing  Abdomen: Soft, Non -tender, +BS   Extremities: No Rashes, No edema  Neuro: A/o x 3, No focal deficits  Psch: normal mood , normal affect    LVAD Interrogation:  Pump Flow:  Pump Speed:  Pulse Index:  Pump Power:  VAD Events:                    8.7    12.9  )-----------( 324      ( 28 Aug 2017 04:43 )  :           27.2     08-    135  |  100  |  27<H>  ----------------------------<  107<H>  4.8   |  22  |  1.24    Ca    9.1      28 Aug 2017 04:44  Mg     1.9     08      PT/INR - ( 28 Aug 2017 04:43 )   PT: 14.4 sec;   INR: 1.33 ratio Subjective: No current issues. He feels well. No complaints of shortness of breath. No bowel movements in two days, but no abdominal pain.     Medications:  mexiletine 150 milliGRAM(s) Oral every 8 hours  amiodarone    Tablet 200 milliGRAM(s) Oral daily  pantoprazole  Injectable 40 milliGRAM(s) IV Push every 12 hours  ferrous    sulfate 325 milliGRAM(s) Oral daily  folic acid 1 milliGRAM(s) Oral daily  ascorbic acid 500 milliGRAM(s) Oral daily  docusate sodium 100 milliGRAM(s) Oral three times a day  carvedilol 6.25 milliGRAM(s) Oral every 12 hours  QUEtiapine 50 milliGRAM(s) Oral at bedtime  sodium chloride 0.9% lock flush 3 milliLiter(s) IV Push every 8 hours  acetaminophen   Tablet. 650 milliGRAM(s) Oral every 6 hours PRN  furosemide    Tablet 20 milliGRAM(s) Oral every other day  polyethylene glycol 3350 17 Gram(s) Oral daily  spironolactone 12.5 milliGRAM(s) Oral daily  hydrocortisone 0.5% Cream 1 Application(s) Topical two times a day PRN      PHYSICAL EXAM:  Vital Signs Last 24 Hrs  T(C): 36.5 (28 Aug 2017 06:00), Max: 36.8 (27 Aug 2017 17:00)  T(F): 97.7 (28 Aug 2017 06:00), Max: 98.2 (27 Aug 2017 17:00)  HR: 78 (28 Aug 2017 06:00) (76 - 86)  BP: 95/71 (28 Aug 2017 01:54) (91/67 - 106/79)  BP(mean): 78 (28 Aug 2017 06:00) (78 - 88)  RR: 16 (28 Aug 2017 06:00) (16 - 18)  99% (28 Aug 2017 06:00) (99% - 100%)  Daily     Daily Weight in k.8 (27 Aug 2017 08:34)  I&O's Detail    27 Aug 2017 07:01  -  28 Aug 2017 07:00  --------------------------------------------------------  IN:    Oral Fluid: 1320 mL  Total IN: 1320 mL    OUT:    Voided: 1490 mL  Total OUT: 1490 mL    Total NET: -170 mL    General: No acute Distress  Neck: Supple,  normal JVP.   Cardiac: S1 S2, No M/R/G, Typical LVAD sounds.   Pulmonary: CTAB, Breathing unlabored, No Rhonchi/Rales/Wheezing.   Abdomen: Soft, Non -tender, +BS   Extremities: No Rashes, No edema  Neuro: A/o x 3, No focal deficits  Psch: normal mood , normal affect    LVAD Interrogation: Heart Mate 2  Pump Flow: 4.4  Pump Speed: 9200  Pulse Index: 6.3  Pump Power: 5.2  VAD Events: No alarms or events  No programming changes were made  Driveline: clean, dry, and intact.                    8.7    12.9  )-----------( 324      ( 28 Aug 2017 04:43 )  :           27.2     08-28    135  |  100  |  27<H>  ----------------------------<  107<H>  4.8   |  22  |  1.24    Ca    9.1      28 Aug 2017 04:44  Mg     1.9     08-      PT/INR - ( 28 Aug 2017 04:43 )   PT: 14.4 sec;   INR: 1.33 ratio

## 2017-08-31 ENCOUNTER — LABORATORY RESULT (OUTPATIENT)
Age: 67
End: 2017-08-31

## 2017-08-31 ENCOUNTER — OTHER (OUTPATIENT)
Age: 67
End: 2017-08-31

## 2017-08-31 ENCOUNTER — NON-APPOINTMENT (OUTPATIENT)
Age: 67
End: 2017-08-31

## 2017-08-31 ENCOUNTER — APPOINTMENT (OUTPATIENT)
Dept: CARDIOLOGY | Facility: CLINIC | Age: 67
End: 2017-08-31
Payer: MEDICARE

## 2017-08-31 VITALS
TEMPERATURE: 97.8 F | HEART RATE: 71 BPM | WEIGHT: 144.5 LBS | SYSTOLIC BLOOD PRESSURE: 92 MMHG | BODY MASS INDEX: 21.9 KG/M2 | DIASTOLIC BLOOD PRESSURE: 60 MMHG | RESPIRATION RATE: 15 BRPM | OXYGEN SATURATION: 98 % | HEIGHT: 68 IN

## 2017-08-31 LAB
BASOPHILS # BLD AUTO: 0.02 K/UL
BASOPHILS NFR BLD AUTO: 0.2 %
EOSINOPHIL # BLD AUTO: 0.3 K/UL
EOSINOPHIL NFR BLD AUTO: 2.9 %
HCT VFR BLD CALC: 28.8 %
HGB BLD-MCNC: 8.7 G/DL
IMM GRANULOCYTES NFR BLD AUTO: 0.4 %
INR PPP: 1.07 RATIO
LYMPHOCYTES # BLD AUTO: 1.55 K/UL
LYMPHOCYTES NFR BLD AUTO: 14.7 %
MAN DIFF?: NORMAL
MCHC RBC-ENTMCNC: 27.9 PG
MCHC RBC-ENTMCNC: 30.2 GM/DL
MCV RBC AUTO: 92.3 FL
MONOCYTES # BLD AUTO: 1.61 K/UL
MONOCYTES NFR BLD AUTO: 15.3 %
NEUTROPHILS # BLD AUTO: 7 K/UL
NEUTROPHILS NFR BLD AUTO: 66.5 %
PLATELET # BLD AUTO: 343 K/UL
PT BLD: 12.1 SEC
RBC # BLD: 3.12 M/UL
RBC # FLD: 19.6 %
WBC # FLD AUTO: 10.52 K/UL

## 2017-08-31 PROCEDURE — 99215 OFFICE O/P EST HI 40 MIN: CPT

## 2017-08-31 PROCEDURE — 93750 INTERROGATION VAD IN PERSON: CPT

## 2017-08-31 RX ORDER — WARFARIN 3 MG/1
3 TABLET ORAL
Qty: 30 | Refills: 2 | Status: DISCONTINUED | COMMUNITY
Start: 2017-08-10 | End: 2017-08-31

## 2017-08-31 RX ORDER — LISINOPRIL 2.5 MG/1
2.5 TABLET ORAL DAILY
Qty: 90 | Refills: 3 | Status: DISCONTINUED | COMMUNITY
Start: 2017-08-10 | End: 2017-08-31

## 2017-09-05 ENCOUNTER — RX RENEWAL (OUTPATIENT)
Age: 67
End: 2017-09-05

## 2017-09-05 RX ORDER — CAMPHOR 0.45 %
25 GEL (GRAM) TOPICAL
Qty: 180 | Refills: 0 | Status: DISCONTINUED | COMMUNITY
Start: 2017-08-10 | End: 2017-09-05

## 2017-09-05 RX ORDER — FOLIC ACID 1 MG/1
1 TABLET ORAL DAILY
Qty: 90 | Refills: 1 | Status: COMPLETED | COMMUNITY
Start: 2017-09-05

## 2017-09-05 RX ORDER — SENNOSIDES 8.6 MG/1
8.6 TABLET ORAL
Qty: 60 | Refills: 2 | Status: DISCONTINUED | COMMUNITY
Start: 2017-05-25 | End: 2017-09-05

## 2017-09-05 RX ORDER — FERROUS SULFATE TAB EC 325 MG (65 MG FE EQUIVALENT) 325 (65 FE) MG
325 (65 FE) TABLET DELAYED RESPONSE ORAL DAILY
Qty: 90 | Refills: 0 | Status: COMPLETED | COMMUNITY
Start: 2017-09-05 | End: 2017-09-05

## 2017-09-06 ENCOUNTER — RESULT REVIEW (OUTPATIENT)
Age: 67
End: 2017-09-06

## 2017-09-06 PROBLEM — I50.22 CHRONIC SYSTOLIC HEART FAILURE: Status: RESOLVED | Noted: 2017-02-08 | Resolved: 2017-09-06

## 2017-09-06 PROBLEM — I50.9 ACC/AHA STAGE D HEART FAILURE: Status: RESOLVED | Noted: 2017-08-17 | Resolved: 2017-09-06

## 2017-09-07 ENCOUNTER — RX RENEWAL (OUTPATIENT)
Age: 67
End: 2017-09-07

## 2017-09-07 RX ORDER — COLCHICINE 0.6 MG/1
0.6 TABLET ORAL DAILY
Qty: 90 | Refills: 0 | Status: DISCONTINUED | COMMUNITY
Start: 2017-08-10 | End: 2017-09-07

## 2017-09-07 RX ORDER — FUROSEMIDE 20 MG/1
20 TABLET ORAL DAILY
Qty: 90 | Refills: 3 | Status: DISCONTINUED | COMMUNITY
Start: 2017-08-10 | End: 2017-09-07

## 2017-09-07 RX ORDER — CARVEDILOL 6.25 MG/1
6.25 TABLET, FILM COATED ORAL
Qty: 60 | Refills: 3 | Status: COMPLETED | COMMUNITY
Start: 2017-09-07

## 2017-09-07 RX ORDER — FOLIC ACID 1 MG/1
1 TABLET ORAL DAILY
Qty: 90 | Refills: 1 | Status: DISCONTINUED | COMMUNITY
Start: 2017-09-05 | End: 2017-09-07

## 2017-09-07 RX ORDER — CARVEDILOL 12.5 MG/1
12.5 TABLET, FILM COATED ORAL
Qty: 180 | Refills: 3 | Status: DISCONTINUED | COMMUNITY
Start: 2017-08-10 | End: 2017-09-07

## 2017-09-12 ENCOUNTER — APPOINTMENT (OUTPATIENT)
Dept: CARDIOLOGY | Facility: CLINIC | Age: 67
End: 2017-09-12
Payer: MEDICARE

## 2017-09-12 VITALS
HEART RATE: 88 BPM | TEMPERATURE: 97.7 F | DIASTOLIC BLOOD PRESSURE: 86 MMHG | RESPIRATION RATE: 14 BRPM | SYSTOLIC BLOOD PRESSURE: 103 MMHG | WEIGHT: 154 LBS | BODY MASS INDEX: 23.42 KG/M2

## 2017-09-12 PROCEDURE — 99215 OFFICE O/P EST HI 40 MIN: CPT

## 2017-09-12 PROCEDURE — 93750 INTERROGATION VAD IN PERSON: CPT

## 2017-09-19 ENCOUNTER — RESULT REVIEW (OUTPATIENT)
Age: 67
End: 2017-09-19

## 2017-09-22 ENCOUNTER — RX RENEWAL (OUTPATIENT)
Age: 67
End: 2017-09-22

## 2017-09-25 ENCOUNTER — INPATIENT (INPATIENT)
Facility: HOSPITAL | Age: 67
LOS: 8 days | Discharge: OTHER INSTITUTION | DRG: 378 | End: 2017-10-04
Attending: THORACIC SURGERY (CARDIOTHORACIC VASCULAR SURGERY) | Admitting: THORACIC SURGERY (CARDIOTHORACIC VASCULAR SURGERY)
Payer: MEDICARE

## 2017-09-25 VITALS
WEIGHT: 154.98 LBS | RESPIRATION RATE: 44 BRPM | HEART RATE: 103 BPM | TEMPERATURE: 98 F | OXYGEN SATURATION: 100 % | HEIGHT: 68 IN

## 2017-09-25 DIAGNOSIS — K92.1 MELENA: ICD-10-CM

## 2017-09-25 DIAGNOSIS — K92.2 GASTROINTESTINAL HEMORRHAGE, UNSPECIFIED: ICD-10-CM

## 2017-09-25 DIAGNOSIS — Z95.811 PRESENCE OF HEART ASSIST DEVICE: Chronic | ICD-10-CM

## 2017-09-25 LAB
ALBUMIN SERPL ELPH-MCNC: 3.4 G/DL — SIGNIFICANT CHANGE UP (ref 3.3–5)
ALP SERPL-CCNC: 72 U/L — SIGNIFICANT CHANGE UP (ref 40–120)
ALT FLD-CCNC: 24 U/L RC — SIGNIFICANT CHANGE UP (ref 10–45)
AMYLASE P1 CFR SERPL: 75 U/L — SIGNIFICANT CHANGE UP (ref 25–125)
ANION GAP SERPL CALC-SCNC: 12 MMOL/L — SIGNIFICANT CHANGE UP (ref 5–17)
ANISOCYTOSIS BLD QL: SLIGHT — SIGNIFICANT CHANGE UP
APPEARANCE UR: CLEAR — SIGNIFICANT CHANGE UP
AST SERPL-CCNC: 26 U/L — SIGNIFICANT CHANGE UP (ref 10–40)
BASOPHILS # BLD AUTO: 0 K/UL — SIGNIFICANT CHANGE UP (ref 0–0.2)
BASOPHILS NFR BLD AUTO: 0.1 % — SIGNIFICANT CHANGE UP (ref 0–2)
BILIRUB SERPL-MCNC: 0.3 MG/DL — SIGNIFICANT CHANGE UP (ref 0.2–1.2)
BILIRUB UR-MCNC: NEGATIVE — SIGNIFICANT CHANGE UP
BLD GP AB SCN SERPL QL: NEGATIVE — SIGNIFICANT CHANGE UP
BUN SERPL-MCNC: 43 MG/DL — HIGH (ref 7–23)
CALCIUM SERPL-MCNC: 8.6 MG/DL — SIGNIFICANT CHANGE UP (ref 8.4–10.5)
CHLORIDE SERPL-SCNC: 105 MMOL/L — SIGNIFICANT CHANGE UP (ref 96–108)
CO2 SERPL-SCNC: 18 MMOL/L — LOW (ref 22–31)
COLOR SPEC: SIGNIFICANT CHANGE UP
CREAT SERPL-MCNC: 1.18 MG/DL — SIGNIFICANT CHANGE UP (ref 0.5–1.3)
DIFF PNL FLD: NEGATIVE — SIGNIFICANT CHANGE UP
ELLIPTOCYTES BLD QL SMEAR: SLIGHT — SIGNIFICANT CHANGE UP
EOSINOPHIL # BLD AUTO: 0.1 K/UL — SIGNIFICANT CHANGE UP (ref 0–0.5)
EOSINOPHIL NFR BLD AUTO: 0.6 % — SIGNIFICANT CHANGE UP (ref 0–6)
FIBRINOGEN PPP-MCNC: 614 MG/DL — HIGH (ref 310–510)
GIANT PLATELETS BLD QL SMEAR: PRESENT — SIGNIFICANT CHANGE UP
GLUCOSE SERPL-MCNC: 103 MG/DL — HIGH (ref 70–99)
GLUCOSE UR QL: NEGATIVE — SIGNIFICANT CHANGE UP
HCT VFR BLD CALC: 16.8 % — CRITICAL LOW (ref 39–50)
HGB BLD-MCNC: 5.4 G/DL — CRITICAL LOW (ref 13–17)
HYPOCHROMIA BLD QL: SIGNIFICANT CHANGE UP
INR BLD: 2.31 RATIO — HIGH (ref 0.88–1.16)
KETONES UR-MCNC: NEGATIVE — SIGNIFICANT CHANGE UP
LEUKOCYTE ESTERASE UR-ACNC: NEGATIVE — SIGNIFICANT CHANGE UP
LYMPHOCYTES # BLD AUTO: 1.8 K/UL — SIGNIFICANT CHANGE UP (ref 1–3.3)
LYMPHOCYTES # BLD AUTO: 13.9 % — SIGNIFICANT CHANGE UP (ref 13–44)
MACROCYTES BLD QL: SLIGHT — SIGNIFICANT CHANGE UP
MAGNESIUM SERPL-MCNC: 2.1 MG/DL — SIGNIFICANT CHANGE UP (ref 1.6–2.6)
MCHC RBC-ENTMCNC: 31.2 PG — SIGNIFICANT CHANGE UP (ref 27–34)
MCHC RBC-ENTMCNC: 32.5 GM/DL — SIGNIFICANT CHANGE UP (ref 32–36)
MCV RBC AUTO: 96.2 FL — SIGNIFICANT CHANGE UP (ref 80–100)
MICROCYTES BLD QL: SLIGHT — SIGNIFICANT CHANGE UP
MONOCYTES # BLD AUTO: 1.7 K/UL — HIGH (ref 0–0.9)
MONOCYTES NFR BLD AUTO: 12.6 % — SIGNIFICANT CHANGE UP (ref 2–14)
NEUTROPHILS # BLD AUTO: 9.7 K/UL — HIGH (ref 1.8–7.4)
NEUTROPHILS NFR BLD AUTO: 72.8 % — SIGNIFICANT CHANGE UP (ref 43–77)
NITRITE UR-MCNC: NEGATIVE — SIGNIFICANT CHANGE UP
OVALOCYTES BLD QL SMEAR: SLIGHT — SIGNIFICANT CHANGE UP
PH UR: 5.5 — SIGNIFICANT CHANGE UP (ref 5–8)
PHOSPHATE SERPL-MCNC: 3.8 MG/DL — SIGNIFICANT CHANGE UP (ref 2.5–4.5)
PLAT MORPH BLD: NORMAL — SIGNIFICANT CHANGE UP
PLATELET # BLD AUTO: 295 K/UL — SIGNIFICANT CHANGE UP (ref 150–400)
POIKILOCYTOSIS BLD QL AUTO: SLIGHT — SIGNIFICANT CHANGE UP
POTASSIUM SERPL-MCNC: 4.4 MMOL/L — SIGNIFICANT CHANGE UP (ref 3.5–5.3)
POTASSIUM SERPL-SCNC: 4.4 MMOL/L — SIGNIFICANT CHANGE UP (ref 3.5–5.3)
PROT SERPL-MCNC: 7.1 G/DL — SIGNIFICANT CHANGE UP (ref 6–8.3)
PROT UR-MCNC: NEGATIVE — SIGNIFICANT CHANGE UP
PROTHROM AB SERPL-ACNC: 25.4 SEC — HIGH (ref 9.8–12.7)
RBC # BLD: 1.75 M/UL — LOW (ref 4.2–5.8)
RBC # FLD: 16.9 % — HIGH (ref 10.3–14.5)
RBC BLD AUTO: ABNORMAL
RH IG SCN BLD-IMP: POSITIVE — SIGNIFICANT CHANGE UP
SODIUM SERPL-SCNC: 135 MMOL/L — SIGNIFICANT CHANGE UP (ref 135–145)
SP GR SPEC: 1.02 — SIGNIFICANT CHANGE UP (ref 1.01–1.02)
TARGETS BLD QL SMEAR: SLIGHT — SIGNIFICANT CHANGE UP
UROBILINOGEN FLD QL: NEGATIVE — SIGNIFICANT CHANGE UP
WBC # BLD: 13.3 K/UL — HIGH (ref 3.8–10.5)
WBC # FLD AUTO: 13.3 K/UL — HIGH (ref 3.8–10.5)

## 2017-09-25 PROCEDURE — 71010: CPT | Mod: 26

## 2017-09-25 RX ORDER — ACETAMINOPHEN 500 MG
650 TABLET ORAL EVERY 8 HOURS
Qty: 0 | Refills: 0 | Status: COMPLETED | OUTPATIENT
Start: 2017-09-25 | End: 2017-09-27

## 2017-09-25 RX ORDER — FERROUS SULFATE 325(65) MG
325 TABLET ORAL DAILY
Qty: 0 | Refills: 0 | Status: DISCONTINUED | OUTPATIENT
Start: 2017-09-25 | End: 2017-10-04

## 2017-09-25 RX ORDER — ACETAMINOPHEN 500 MG
650 TABLET ORAL ONCE
Qty: 0 | Refills: 0 | Status: COMPLETED | OUTPATIENT
Start: 2017-09-25 | End: 2017-09-25

## 2017-09-25 RX ORDER — CARVEDILOL PHOSPHATE 80 MG/1
6.25 CAPSULE, EXTENDED RELEASE ORAL EVERY 12 HOURS
Qty: 0 | Refills: 0 | Status: DISCONTINUED | OUTPATIENT
Start: 2017-09-25 | End: 2017-09-29

## 2017-09-25 RX ORDER — MEXILETINE HYDROCHLORIDE 150 MG/1
150 CAPSULE ORAL EVERY 8 HOURS
Qty: 0 | Refills: 0 | Status: DISCONTINUED | OUTPATIENT
Start: 2017-09-25 | End: 2017-10-04

## 2017-09-25 RX ORDER — QUETIAPINE FUMARATE 200 MG/1
50 TABLET, FILM COATED ORAL DAILY
Qty: 0 | Refills: 0 | Status: DISCONTINUED | OUTPATIENT
Start: 2017-09-25 | End: 2017-10-04

## 2017-09-25 RX ORDER — PANTOPRAZOLE SODIUM 20 MG/1
40 TABLET, DELAYED RELEASE ORAL EVERY 12 HOURS
Qty: 0 | Refills: 0 | Status: DISCONTINUED | OUTPATIENT
Start: 2017-09-25 | End: 2017-10-04

## 2017-09-25 RX ORDER — ASCORBIC ACID 60 MG
500 TABLET,CHEWABLE ORAL DAILY
Qty: 0 | Refills: 0 | Status: DISCONTINUED | OUTPATIENT
Start: 2017-09-25 | End: 2017-10-04

## 2017-09-25 RX ORDER — FUROSEMIDE 40 MG
20 TABLET ORAL EVERY OTHER DAY
Qty: 0 | Refills: 0 | Status: DISCONTINUED | OUTPATIENT
Start: 2017-09-26 | End: 2017-09-26

## 2017-09-25 RX ORDER — AMIODARONE HYDROCHLORIDE 400 MG/1
200 TABLET ORAL DAILY
Qty: 0 | Refills: 0 | Status: DISCONTINUED | OUTPATIENT
Start: 2017-09-25 | End: 2017-10-04

## 2017-09-25 RX ORDER — SODIUM CHLORIDE 9 MG/ML
1000 INJECTION INTRAMUSCULAR; INTRAVENOUS; SUBCUTANEOUS
Qty: 0 | Refills: 0 | Status: DISCONTINUED | OUTPATIENT
Start: 2017-09-25 | End: 2017-09-28

## 2017-09-25 RX ORDER — FOLIC ACID 0.8 MG
1 TABLET ORAL DAILY
Qty: 0 | Refills: 0 | Status: DISCONTINUED | OUTPATIENT
Start: 2017-09-25 | End: 2017-10-04

## 2017-09-25 RX ADMIN — PANTOPRAZOLE SODIUM 40 MILLIGRAM(S): 20 TABLET, DELAYED RELEASE ORAL at 23:00

## 2017-09-25 RX ADMIN — Medication 650 MILLIGRAM(S): at 22:58

## 2017-09-25 RX ADMIN — Medication 500 MILLIGRAM(S): at 22:58

## 2017-09-25 RX ADMIN — Medication 325 MILLIGRAM(S): at 22:59

## 2017-09-25 RX ADMIN — SODIUM CHLORIDE 20 MILLILITER(S): 9 INJECTION INTRAMUSCULAR; INTRAVENOUS; SUBCUTANEOUS at 21:00

## 2017-09-25 RX ADMIN — AMIODARONE HYDROCHLORIDE 200 MILLIGRAM(S): 400 TABLET ORAL at 22:58

## 2017-09-25 RX ADMIN — MEXILETINE HYDROCHLORIDE 150 MILLIGRAM(S): 150 CAPSULE ORAL at 22:50

## 2017-09-25 RX ADMIN — CARVEDILOL PHOSPHATE 6.25 MILLIGRAM(S): 80 CAPSULE, EXTENDED RELEASE ORAL at 22:49

## 2017-09-25 RX ADMIN — QUETIAPINE FUMARATE 50 MILLIGRAM(S): 200 TABLET, FILM COATED ORAL at 22:49

## 2017-09-25 RX ADMIN — Medication 1 MILLIGRAM(S): at 22:59

## 2017-09-25 NOTE — H&P ADULT - ASSESSMENT
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 pt admitted for PRBC,

## 2017-09-25 NOTE — H&P ADULT - NSHPPHYSICALEXAM_GEN_ALL_CORE
General: A&Ox3, NAD. Poor hygiene noted.  Neuro: Appears mildly confused. No focal deficits.   Card: - S1, S2. + LVAD sounds. SR 90s on telemetry.  Pulm: CTA b/l  Abd: soft, nontender, nondistended. + BS sounds. Driveline site without erythema or discharge.  Extrem: Negative LE edema, calf tenderness. + DP pulses.

## 2017-09-25 NOTE — H&P ADULT - HISTORY OF PRESENT ILLNESS
67 year old man with chronic systolic heart failure, ACC/AHA stage D, due to nonischemic cardiomyopathy, s/p LVAD 6/12/17, GIB s/p cautery 7/25/17 , known AV malformation, now anemia. Admitted for PRBC .

## 2017-09-25 NOTE — H&P ADULT - PROBLEM SELECTOR PLAN 1
Admit to CTU. 2 PRBCs ordered. Monitor H/H, melena. Hold Coumadin. Continue ferrous sulfate, folic acid, ascorbic acid.

## 2017-09-25 NOTE — H&P ADULT - NSHPLABSRESULTS_GEN_ALL_CORE
CT of the head was performed without intravenous contrast: IMPRESSION: No intracranial hemorrhage or CT evidence of acute transcortical infarct.    LVAD stats on Friday 8/18 at LVAD clinic: Speed 9200RPM, Flow 4.5L/min, Power 5.2W, PI 7.0, MAP 50  LVAD stats on Monday 8/19 when admitted to CTU: Speed 9200RPM, Flow 4.6L/min, Power 5.2W, PI 7.5, MAP 84

## 2017-09-25 NOTE — H&P ADULT - PSH
AICD (Automatic Cardioverter/Defibrillator) Present  [employer] with 1 [employer] lead4/1/09- explanted and replaced with [employer] 2 leads on 9/2/09  History of Prior Ablation Treatment  for afib  LVAD (left ventricular assist device) present    Status post left hip replacement

## 2017-09-26 LAB
ALBUMIN SERPL ELPH-MCNC: 2.9 G/DL — LOW (ref 3.3–5)
ALP SERPL-CCNC: 65 U/L — SIGNIFICANT CHANGE UP (ref 40–120)
ALT FLD-CCNC: 20 U/L RC — SIGNIFICANT CHANGE UP (ref 10–45)
ANION GAP SERPL CALC-SCNC: 13 MMOL/L — SIGNIFICANT CHANGE UP (ref 5–17)
ANISOCYTOSIS BLD QL: SLIGHT — SIGNIFICANT CHANGE UP
AST SERPL-CCNC: 24 U/L — SIGNIFICANT CHANGE UP (ref 10–40)
BASOPHILS # BLD AUTO: 0 K/UL — SIGNIFICANT CHANGE UP (ref 0–0.2)
BILIRUB SERPL-MCNC: 0.5 MG/DL — SIGNIFICANT CHANGE UP (ref 0.2–1.2)
BUN SERPL-MCNC: 39 MG/DL — HIGH (ref 7–23)
CALCIUM SERPL-MCNC: 8.6 MG/DL — SIGNIFICANT CHANGE UP (ref 8.4–10.5)
CHLORIDE SERPL-SCNC: 105 MMOL/L — SIGNIFICANT CHANGE UP (ref 96–108)
CO2 SERPL-SCNC: 17 MMOL/L — LOW (ref 22–31)
CREAT SERPL-MCNC: 1.03 MG/DL — SIGNIFICANT CHANGE UP (ref 0.5–1.3)
EOSINOPHIL # BLD AUTO: 0.1 K/UL — SIGNIFICANT CHANGE UP (ref 0–0.5)
EOSINOPHIL NFR BLD AUTO: 1 % — SIGNIFICANT CHANGE UP (ref 0–6)
GLUCOSE SERPL-MCNC: 93 MG/DL — SIGNIFICANT CHANGE UP (ref 70–99)
HCT VFR BLD CALC: 20.6 % — CRITICAL LOW (ref 39–50)
HCT VFR BLD CALC: 26.4 % — LOW (ref 39–50)
HCT VFR BLD CALC: 29 % — LOW (ref 39–50)
HCT VFR BLD CALC: 29.5 % — LOW (ref 39–50)
HGB BLD-MCNC: 10 G/DL — LOW (ref 13–17)
HGB BLD-MCNC: 7.1 G/DL — LOW (ref 13–17)
HGB BLD-MCNC: 9.2 G/DL — LOW (ref 13–17)
HGB BLD-MCNC: 9.8 G/DL — LOW (ref 13–17)
INR BLD: 2.28 RATIO — HIGH (ref 0.88–1.16)
LYMPHOCYTES # BLD AUTO: 1.6 K/UL — SIGNIFICANT CHANGE UP (ref 1–3.3)
LYMPHOCYTES # BLD AUTO: 11 % — LOW (ref 13–44)
MACROCYTES BLD QL: SIGNIFICANT CHANGE UP
MCHC RBC-ENTMCNC: 29.4 PG — SIGNIFICANT CHANGE UP (ref 27–34)
MCHC RBC-ENTMCNC: 29.8 PG — SIGNIFICANT CHANGE UP (ref 27–34)
MCHC RBC-ENTMCNC: 30.5 PG — SIGNIFICANT CHANGE UP (ref 27–34)
MCHC RBC-ENTMCNC: 31.6 PG — SIGNIFICANT CHANGE UP (ref 27–34)
MCHC RBC-ENTMCNC: 33.6 GM/DL — SIGNIFICANT CHANGE UP (ref 32–36)
MCHC RBC-ENTMCNC: 33.9 GM/DL — SIGNIFICANT CHANGE UP (ref 32–36)
MCHC RBC-ENTMCNC: 34.3 GM/DL — SIGNIFICANT CHANGE UP (ref 32–36)
MCHC RBC-ENTMCNC: 34.9 GM/DL — SIGNIFICANT CHANGE UP (ref 32–36)
MCV RBC AUTO: 87.4 FL — SIGNIFICANT CHANGE UP (ref 80–100)
MCV RBC AUTO: 87.5 FL — SIGNIFICANT CHANGE UP (ref 80–100)
MCV RBC AUTO: 87.7 FL — SIGNIFICANT CHANGE UP (ref 80–100)
MCV RBC AUTO: 92.2 FL — SIGNIFICANT CHANGE UP (ref 80–100)
METAMYELOCYTES # FLD: 1 % — HIGH (ref 0–0)
MONOCYTES # BLD AUTO: 2 K/UL — HIGH (ref 0–0.9)
MONOCYTES NFR BLD AUTO: 14 % — SIGNIFICANT CHANGE UP (ref 2–14)
NEUTROPHILS # BLD AUTO: 7.4 K/UL — SIGNIFICANT CHANGE UP (ref 1.8–7.4)
NEUTROPHILS NFR BLD AUTO: 73 % — SIGNIFICANT CHANGE UP (ref 43–77)
OVALOCYTES BLD QL SMEAR: SLIGHT — SIGNIFICANT CHANGE UP
PLAT MORPH BLD: NORMAL — SIGNIFICANT CHANGE UP
PLATELET # BLD AUTO: 219 K/UL — SIGNIFICANT CHANGE UP (ref 150–400)
PLATELET # BLD AUTO: 239 K/UL — SIGNIFICANT CHANGE UP (ref 150–400)
PLATELET # BLD AUTO: 257 K/UL — SIGNIFICANT CHANGE UP (ref 150–400)
PLATELET # BLD AUTO: 258 K/UL — SIGNIFICANT CHANGE UP (ref 150–400)
POIKILOCYTOSIS BLD QL AUTO: SLIGHT — SIGNIFICANT CHANGE UP
POTASSIUM SERPL-MCNC: 4.3 MMOL/L — SIGNIFICANT CHANGE UP (ref 3.5–5.3)
POTASSIUM SERPL-SCNC: 4.3 MMOL/L — SIGNIFICANT CHANGE UP (ref 3.5–5.3)
PROT SERPL-MCNC: 6.2 G/DL — SIGNIFICANT CHANGE UP (ref 6–8.3)
PROTHROM AB SERPL-ACNC: 25 SEC — HIGH (ref 9.8–12.7)
RBC # BLD: 2.24 M/UL — LOW (ref 4.2–5.8)
RBC # BLD: 3.02 M/UL — LOW (ref 4.2–5.8)
RBC # BLD: 3.32 M/UL — LOW (ref 4.2–5.8)
RBC # BLD: 3.37 M/UL — LOW (ref 4.2–5.8)
RBC # FLD: 15.6 % — HIGH (ref 10.3–14.5)
RBC # FLD: 17.8 % — HIGH (ref 10.3–14.5)
RBC # FLD: 18.2 % — HIGH (ref 10.3–14.5)
RBC # FLD: 18.2 % — HIGH (ref 10.3–14.5)
RBC BLD AUTO: ABNORMAL
SODIUM SERPL-SCNC: 135 MMOL/L — SIGNIFICANT CHANGE UP (ref 135–145)
WBC # BLD: 11 K/UL — HIGH (ref 3.8–10.5)
WBC # BLD: 11.7 K/UL — HIGH (ref 3.8–10.5)
WBC # BLD: 14 K/UL — HIGH (ref 3.8–10.5)
WBC # BLD: 9.6 K/UL — SIGNIFICANT CHANGE UP (ref 3.8–10.5)
WBC # FLD AUTO: 11 K/UL — HIGH (ref 3.8–10.5)
WBC # FLD AUTO: 11.7 K/UL — HIGH (ref 3.8–10.5)
WBC # FLD AUTO: 14 K/UL — HIGH (ref 3.8–10.5)
WBC # FLD AUTO: 9.6 K/UL — SIGNIFICANT CHANGE UP (ref 3.8–10.5)

## 2017-09-26 PROCEDURE — 99223 1ST HOSP IP/OBS HIGH 75: CPT | Mod: 25,GC

## 2017-09-26 PROCEDURE — 93010 ELECTROCARDIOGRAM REPORT: CPT

## 2017-09-26 PROCEDURE — 99291 CRITICAL CARE FIRST HOUR: CPT

## 2017-09-26 PROCEDURE — 93750 INTERROGATION VAD IN PERSON: CPT

## 2017-09-26 RX ADMIN — MEXILETINE HYDROCHLORIDE 150 MILLIGRAM(S): 150 CAPSULE ORAL at 14:13

## 2017-09-26 RX ADMIN — PANTOPRAZOLE SODIUM 40 MILLIGRAM(S): 20 TABLET, DELAYED RELEASE ORAL at 05:58

## 2017-09-26 RX ADMIN — CARVEDILOL PHOSPHATE 6.25 MILLIGRAM(S): 80 CAPSULE, EXTENDED RELEASE ORAL at 17:34

## 2017-09-26 RX ADMIN — QUETIAPINE FUMARATE 50 MILLIGRAM(S): 200 TABLET, FILM COATED ORAL at 23:11

## 2017-09-26 RX ADMIN — MEXILETINE HYDROCHLORIDE 150 MILLIGRAM(S): 150 CAPSULE ORAL at 05:37

## 2017-09-26 RX ADMIN — Medication 500 MILLIGRAM(S): at 12:40

## 2017-09-26 RX ADMIN — CARVEDILOL PHOSPHATE 6.25 MILLIGRAM(S): 80 CAPSULE, EXTENDED RELEASE ORAL at 05:35

## 2017-09-26 RX ADMIN — MEXILETINE HYDROCHLORIDE 150 MILLIGRAM(S): 150 CAPSULE ORAL at 23:11

## 2017-09-26 RX ADMIN — Medication 325 MILLIGRAM(S): at 12:40

## 2017-09-26 RX ADMIN — PANTOPRAZOLE SODIUM 40 MILLIGRAM(S): 20 TABLET, DELAYED RELEASE ORAL at 17:34

## 2017-09-26 RX ADMIN — AMIODARONE HYDROCHLORIDE 200 MILLIGRAM(S): 400 TABLET ORAL at 05:34

## 2017-09-26 RX ADMIN — Medication 1 MILLIGRAM(S): at 12:40

## 2017-09-26 NOTE — PROVIDER CONTACT NOTE (CRITICAL VALUE NOTIFICATION) - ACTION/TREATMENT ORDERED:
type and screen; possible blood transfusion
will give two units of blood as per OPAL Kennedy orders, no other intervention ordered at this time.

## 2017-09-26 NOTE — CONSULT NOTE ADULT - ASSESSMENT
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  sp 2uprbc with appropriate response    # UGIB likely 2/2 AVMs: MAPS remain elevateda t around 80s, and hgb responded appropriately.  - cont trending hgb  - holding coumadin for now until INR downtrends to <1.8 and willr eoncisder at that time depending on ugib setting.  - protonix 40mg ivp bid, gi c/s    #NICM sp HM LVAD:  - Current settings:    - given hemodynams table at this time with appropriate response in hgb suggestive of resolved bleeding, cont coreg 12.5mg bid, aldactone 25, lasix 20 eod    # prior VT with [employer] ICD: cont mexilitine 150mg tid, amio 200 qd,     # depression: cont quetiapine 50 qhs.     Device Type: HM2  Flow: 5.0 L/min  Speed: 9200 RPM  Power: 5.4 Navarro  PI: 7.0  MAP: 94 Patient pulsetile /86 67M pmhx of NICM with LVEF 20%, ACC/AHA stage sp HMII LVAD 6/12/2017 as BTD, prior VT on amiodarone and mexilitine w/ [employer] dual lead ICD, 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  sp 2uprbc with appropriate response    # UGIB likely 2/2 AVMs: MAPS remain elevated at around 80s, and hgb responded appropriately suggestive of cessation of bleeding. Last Bm was on Sunday, which was dark. No Bm yesterday  - cont trending hgb, monitor BM  - holding coumadin for now until INR downtrends to <1.8 and will reoncisder at that time when to restart coumadin depending on ugib setting.  - protonix 40mg ivp bid, gi c/s    #NICM sp HM LVAD:  - Current settings:  5.1L/M speed 9200 PI 6.3 5.5W  - given hemodynams table at this time with appropriate response in hgb suggestive of resolved bleeding, cont coreg 12.5mg bid,   - would hold aldactone 25, lasix 20 eod at this time given appear euvolemic, and has been with poor PO intake in the last 48 hours with the gib per patient.     # prior VT with [employer] ICD: cont mexilitine 150mg tid, amio 200 qd,  coreg as above    # depression: cont quetiapine 50 qhs.     Claudia Francis MD  [facility]  [phone] (After 5pm and on weekends, please call [phone])      outpt setting:  Device Type: HM2  Flow: 5.0 L/min  Speed: 9200 RPM  Power: 5.4 Navarro  PI: 7.0  MAP: 94 Patient pulsetile /86

## 2017-09-26 NOTE — PROGRESS NOTE ADULT - SUBJECTIVE AND OBJECTIVE BOX
BILLY RUSSELL   MRN#: [id number]     The patient is a 67y Male who was seen, evaluated, & examined with the CTICU staff on rounds and later in the day with a multidisciplinary care plan formulated & implemented.  All available clinical, laboratory, radiographic, pharmacologic, and electrocardiographic data were reviewed & analyzed.      The patient was in the CTICU in critical condition secondary to severe blood loss anemia-probable upper GI bleed S/P LVAD in June 2017.      Cardiovascular status required transfusion with four units of PRBC's, the following of serial H&H's, NPO after midnight for planned endoscopy, holding Coumadin and holding ASA.      I provided 30 minutes of non-continuous critical care to this  patient.  Discussed at length with the CTICU staff and helped coordinate care.

## 2017-09-26 NOTE — CONSULT NOTE ADULT - SUBJECTIVE AND OBJECTIVE BOX
ADVANCED ENDOSCOPY CONSULT:     Chief Complaint:  Patient is a 67y old  Male who presents with a chief complaint of melena, Anemia. (25 Sep 2017 22:44)    HPI:  67M with severe systolic CHF s/p LVAD 17, on coumadin, h/o jejunal angioectasias (s/p enteroscopy 17 showing 2 jejunal angioectasias s/p APC and clips, s/p VCE and enteroscopy 17 showing 3 jejunal angioectasias s/p APC), h/o gastric ulcer (2017) on PPI daily presenting from home with melena and weakness. Melena started Saturday (17) and was followed by weakness which worsened over the rest of the weekend. He denies any hematochezia or hematemesis. He denies any abdominal pain, nausea, or vomiting. He is tolerating PO. He last ate . He received 4 units PRBC since admission. He has not had a melanotic stool since . Currently he denies CP or SOB. His lightheadedness has resolved.       Allergies:  No Known Allergies      Home Medications: reviewed     Hospital Medications:  pantoprazole  Injectable 40 milliGRAM(s) IV Push every 12 hours  amiodarone    Tablet 200 milliGRAM(s) Oral daily  carvedilol 6.25 milliGRAM(s) Oral every 12 hours  ferrous    sulfate 325 milliGRAM(s) Oral daily  folic acid 1 milliGRAM(s) Oral daily  furosemide    Tablet 20 milliGRAM(s) Oral every other day  mexiletine 150 milliGRAM(s) Oral every 8 hours  QUEtiapine 50 milliGRAM(s) Oral daily  acetaminophen   Tablet 650 milliGRAM(s) Oral every 8 hours PRN  ascorbic acid 500 milliGRAM(s) Oral daily  sodium chloride 0.9%. 1000 milliLiter(s) IV Continuous <Continuous>      PMHX/PSHX:  GIB (gastrointestinal bleeding)  H/O prior ablation treatment  Ventricular fibrillation  PAF (paroxysmal atrial fibrillation)  Non-Ischemic Cardiomyopathy  SVT (Supraventricular Tachycardia)  HTN  CHF (Congestive Heart Failure)  LVAD (left ventricular assist device) present  Status post left hip replacement  Hypertension  Hypertension  History of Prior Ablation Treatment  AICD (Automatic Cardioverter/Defibrillator) Present      Family history:  No pertinent family history in first degree relatives      Social History: no current tobacco, ETOH, or IVDA     ROS:     General:  No wt loss, fevers, chills, night sweats, fatigue,   Eyes:  Good vision, no reported pain  ENT:  No sore throat, pain, runny nose, dysphagia  CV:  No pain, palpitations, hypo/hypertension  Resp:  No dyspnea, cough, tachypnea, wheezing  GI:  No pain, No nausea, No vomiting, No diarrhea, No constipation, No weight loss, No fever, No pruritis, No rectal bleeding, + tarry stools, No dysphagia,  :  No pain, bleeding, incontinence, nocturia  Muscle:  No pain, weakness  Neuro:  No weakness, tingling, memory problems  Psych:  No fatigue, insomnia, mood problems, depression  Endocrine:  No polyuria, polydipsia, cold/heat intolerance  Heme:  No petechiae, ecchymosis, easy bruisability  Skin:  No rash, tattoos, scars, edema      PHYSICAL EXAM:     GENERAL:  Appears stated age, well-groomed, well-nourished, no distress  HEENT:  NC/AT,  conjunctivae clear and pink, no thyromegaly, nodules, adenopathy, no JVD, sclera -anicteric  CHEST:  Full & symmetric excursion, no increased effort, breath sounds clear  HEART:  +LVAD   ABDOMEN:  Soft, non-tender, non-distended, normoactive bowel sounds,  no masses ,no hepato-splenomegaly, no signs of chronic liver disease  EXTEREMITIES:  no cyanosis,clubbing or edema  SKIN:  No rash/erythema/ecchymoses/petechiae/wounds/abscess/warm/dry  NEURO:  Alert, oriented x 3, no asterixis, no tremor, no encephalopathy    Vital Signs:  Vital Signs Last 24 Hrs  T(C): 36.4 (26 Sep 2017 08:00), Max: 36.7 (25 Sep 2017 20:30)  T(F): 97.5 (26 Sep 2017 08:00), Max: 98.1 (25 Sep 2017 20:30)  HR: 79 (26 Sep 2017 11:00) (76 - 103)  BP: --  BP(mean): 80 (26 Sep 2017 11:00) (80 - 86)  RR: 14 (26 Sep 2017 11:00) (13 - 42)  SpO2: 97% (26 Sep 2017 11:00) (93% - 100%)  Daily Height in cm: 172.72 (25 Sep 2017 20:30)    Daily Weight in k.7 (26 Sep 2017 00:00)    LABS:                        9.2    11.0  )-----------( 219      ( 26 Sep 2017 05:40 )             26.4   Hemoglobin: 9.2 g/dL (17 @ 05:40)  Hemoglobin: 7.1 g/dL (17 @ 01:50)  Hemoglobin: 5.4 g/dL (17 @ 21:52)    Mean Cell Volume: 87.5 fl (17 @ 05:40)        135  |  105  |  39<H>  ----------------------------<  93  4.3   |  17<L>  |  1.03    Ca    8.6      26 Sep 2017 01:51  Phos  3.8       Mg     2.1         TPro  6.2  /  Alb  2.9<L>  /  TBili  0.5  /  DBili  x   /  AST  24  /  ALT  20  /  AlkPhos  65      PT/INR - ( 26 Sep 2017 01:50 )   PT: 25.0 sec;   INR: 2.28 ratio           Urinalysis Basic - ( 25 Sep 2017 22:11 )    Color: x / Appearance: Clear / S.017 / pH: x  Gluc: x / Ketone: Negative  / Bili: Negative / Urobili: Negative   Blood: x / Protein: Negative / Nitrite: Negative   Leuk Esterase: Negative / RBC: x / WBC x   Sq Epi: x / Non Sq Epi: x / Bacteria: x      Amylase Serum75      Lipase serum--       Ammonia--                          9.2    11.0  )-----------( 219      ( 26 Sep 2017 05:40 )             26.4                         7.1    14.0  )-----------( 257      ( 26 Sep 2017 01:50 )             20.6                         5.4    13.3  )-----------( 295      ( 25 Sep 2017 21:52 )             16.8     Imaging:    < from: Enteroscopy (17 @ 12:06) >  [facility]  ____________________________________________________________________________________________________  Patient Name: Yoseph Baez                       MRN: [id number]  Account Number: [id number]                     YOB: 1950  Room: Endoscopy Room 2                           Gender: Male  Attending MD: Quentin James MD                    Procedure Date No Time: 2017  ____________________________________________________________________________________________________     Procedure:           Upper endoscopy/Push Enteroscopy  Indications:         Gastrointestinal bleeding (melena), Blood loss anemia, Small bowel capsule                        showing angioectasias in proximal small bowel, LVAD in place.  Providers:           Quentin James MD, Christos Pickett MD (Fellow)  Referring MD:        Ayaz Barr  Medicines:           Monitored Anesthesia Care  Complications:       No immediate complications.  ____________________________________________________________________________________________________  Procedure:           Pre-Anesthesia Assessment:                       - Prior to the procedure, a History and Physical was performed, and patient                        medications, allergies and sensitivities were reviewed. The patient's                        tolerance of previous anesthesia was reviewed.                       - The risks and benefits of the procedure and the sedation options and risks                        were discussed with the patient. All questions were answered and informed                        consent was obtained.                       - Patient identification and proposed procedure were verified prior to the                        procedure by the physician. The procedure was verified in the endoscopy suite.                       After obtaining informed consent, the endoscope was passed under direct                        vision. Throughout the procedure, the patient's blood pressure, pulse, and                        oxygen saturations were monitored continuously. The pediatric colonoscope was                        introduced through the mouth and advanced to the proximal/mid jejunum. The                        small bowel enteroscopy wasaccomplished without difficulty. The patient                        tolerated the procedure well.                                                                                                        Findings:       The examined esophagus was normal.       A few, non-bleeding erosions were found in the stomach.       Three nonbleeding angioectasias measuring up to 3 mm were found in the proximal-mid jejunum.        Coagulation for hemostasis using argon plasma at 0.5 liters/minute and 20 raymond was        successful.                                                                                                        Impression:          - Non-bleeding erosive gastropathy.                       - Three non-bleeding angiodysplastic lesions inthe jejunum. Treated with                        argon plasma coagulation (APC).  Recommendation:      - [facility].                       - Clear liquid diet, advance tomorrow if feeling well.     - Monitor H/H, transfuse accordingly    < end of copied text >      < from: Enteroscopy (17 @ 12:08) >  [facility]  ____________________________________________________________________________________________________  Patient Name: Yoseph Baez                       MRN: [id number]  Account Number: [id number]                     YOB: 1950  Room: Endoscopy Room 1                           Gender: Male  Attending MD: DOMINIK PASCUAL MD                 Procedure Date No Time: 2017  ____________________________________________________________________________________________________     Procedure:           Small bowel enteroscopy  Indications:         Melena  Providers:           DOMINIK PASCUAL MD, Christos Pickett MD (Fellow)  Referring MD:        Ayaz Barr  Medicines:           Monitored Anesthesia Care  Complications:       No immediate complications.  ____________________________________________________________________________________________________  Procedure:           Pre-Anesthesia Assessment:                       - Prior to the procedure, a History and Physical was performed, and patient                        medications, allergies and sensitivities were reviewed. The patient's                        tolerance of previous anesthesia was reviewed.                       - The risks and benefits of the procedure and the sedation options and risks                        were discussed with the patient. All questions were answered and informed                        consent was obtained.                       - Patient identificationand proposed procedure were verified prior to the   Surgical Pathology Report (17 @ 13:00)    Surgical Pathology Report:   ACCESSION No:  [id number]    YVONNEYOSEPH NGUYEN                         Diana        Surgical Final Report          Final Diagnosis    1. Stomach, antrum, biopsy:  - Gastric antral mucosa with intestinal metaplasia and  chronic gastritis.  - Negative for dysplasia.  - Negative for Helicobacter pylori (special stain).    2. Gastric ulcer, biopsy:  - Gastric antral-oxyntic mucosa with intestinal metaplasia  in background of  ulceration and chronic active gastritis.  - Negative for dysplasia (additional levels x3 examined).  - Negative for Helicobacter pylori (special stain and  immunohistochemistry).    Slide(s) with built in immunohistochemical study control(s)  associated and negative control with this case has/have been  verified by the sign out pathologist.  These immunohistochemical/ in-situ hybridization tests have been  developed and their performance characteristics determined by  [facility], Department of  Pathology, Division of Immunopathology, [id number] [address] [place] [id number].  It has not been cleared or approved by the  [place]. Food and Drug Administration.  The FDA has determined that  such clearance or approval is not necessary.  This test is used  for clinical purposes.  The laboratory iscertified under the  CLIA-88 as qualified to perform high  complexity clinical testing.    Ariel Johnson M.D.  (Electronic Signature)  Reported on: 17    Clinical History  66 years old with CAF, gastric ulcer.  EGD    Specimen(s) Submitted  1-Antrum biopsy  2- Gastric ulcer    Gross Description  17 17:55  1.   The specimen is received in formalin and the specimen  container is labeled: Antrum.  It consists of one fragments of  tan-pink, soft tissue, measuring 0.3 cm in greatest dimension.  Entirely submitted.  One cassette.            YOSEPH BAEZ 2        Surgical Final Report            2.   The specimen is received in formalin and the specimen  container is labeled: Gastric ulcer.  It consists of three  fragments of tan-pink, soft tissue, ranging from 0.1-0.3  cm in  greatest dimension.  Entirely submitted.  One cassette.    In addition to other data that may appear on the specimen  containers, all labels have been inspected to confirm the  presenceof the patient's name and date of birth.    XW  Thu  2017 05:56 PM                         procedure by the physician. The procedure was verified in the endoscopy suite.                       After obtaining informed consent, the endoscope was passed under direct                     vision. Throughout the procedure, the patient's blood pressure, pulse, and                        oxygen saturations were monitored continuously. The Colonoscope was                        introduced through the mouth and advanced to the proximal jejunum. The small                        bowel enteroscopy was accomplished without difficulty. The patient tolerated                        the procedure well.                              Findings:       The examined esophagus was normal.       The entire examined stomach was normal.       There was no evidence of significant pathology in the entire examined duodenum.       Two angioectasias with no bleeding were found in the proximal jejunum. Coagulation for        hemostasis using bipolar probe was successful.                                                                                                        Impression:          - Normal esophagus,stomach and duodenum.                       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 Treated with bipolar cautery.                       - No specimens collected.  Recommendation:      - [facility].                  - Plan for small bowel capsule study today. ADVANCED ENDOSCOPY CONSULT:     Chief Complaint:  Patient is a 67y old  Male who presents with a chief complaint of melena, Anemia. (25 Sep 2017 22:44)    HPI:  67M with severe systolic CHF s/p LVAD 17, on coumadin, h/o jejunal angioectasias (s/p push enteroscopy 17 showing 2 jejunal angioectasias s/p APC and clips, s/p push enteroscopy 17 showing 3 jejunal angioectasias s/p APC), h/o gastric ulcer (2017) on PPI daily presenting from home with melena and weakness. Melena started Saturday (17) and was followed by weakness which worsened over the rest of the weekend. He denies any hematochezia or hematemesis. He denies any abdominal pain, nausea, or vomiting. He is tolerating PO. He last ate . He received 4 units PRBC since admission. He has not had a melanotic stool since . Currently he denies CP or SOB. His lightheadedness has resolved.     Pt with total of 3 VCE's (17, 17, 17)- all showing small bowel angioectasias       Allergies:  No Known Allergies      Home Medications: reviewed     Hospital Medications:  pantoprazole  Injectable 40 milliGRAM(s) IV Push every 12 hours  amiodarone    Tablet 200 milliGRAM(s) Oral daily  carvedilol 6.25 milliGRAM(s) Oral every 12 hours  ferrous    sulfate 325 milliGRAM(s) Oral daily  folic acid 1 milliGRAM(s) Oral daily  furosemide    Tablet 20 milliGRAM(s) Oral every other day  mexiletine 150 milliGRAM(s) Oral every 8 hours  QUEtiapine 50 milliGRAM(s) Oral daily  acetaminophen   Tablet 650 milliGRAM(s) Oral every 8 hours PRN  ascorbic acid 500 milliGRAM(s) Oral daily  sodium chloride 0.9%. 1000 milliLiter(s) IV Continuous <Continuous>      PMHX/PSHX:  GIB (gastrointestinal bleeding)  H/O prior ablation treatment  Ventricular fibrillation  PAF (paroxysmal atrial fibrillation)  Non-Ischemic Cardiomyopathy  SVT (Supraventricular Tachycardia)  HTN  CHF (Congestive Heart Failure)  LVAD (left ventricular assist device) present  Status post left hip replacement  Hypertension  Hypertension  History of Prior Ablation Treatment  AICD (Automatic Cardioverter/Defibrillator) Present      Family history:  No pertinent family history in first degree relatives      Social History: no current tobacco, ETOH, or IVDA     ROS:     General:  No wt loss, fevers, chills, night sweats, fatigue,   Eyes:  Good vision, no reported pain  ENT:  No sore throat, pain, runny nose, dysphagia  CV:  No pain, palpitations, hypo/hypertension  Resp:  No dyspnea, cough, tachypnea, wheezing  GI:  No pain, No nausea, No vomiting, No diarrhea, No constipation, No weight loss, No fever, No pruritis, No rectal bleeding, + tarry stools, No dysphagia,  :  No pain, bleeding, incontinence, nocturia  Muscle:  No pain, weakness  Neuro:  No weakness, tingling, memory problems  Psych:  No fatigue, insomnia, mood problems, depression  Endocrine:  No polyuria, polydipsia, cold/heat intolerance  Heme:  No petechiae, ecchymosis, easy bruisability  Skin:  No rash, tattoos, scars, edema      PHYSICAL EXAM:     GENERAL:  Appears stated age, well-groomed, well-nourished, no distress  HEENT:  NC/AT,  conjunctivae clear and pink, no thyromegaly, nodules, adenopathy, no JVD, sclera -anicteric  CHEST:  Full & symmetric excursion, no increased effort, breath sounds clear  HEART:  +LVAD   ABDOMEN:  Soft, non-tender, non-distended, normoactive bowel sounds,  no masses ,no hepato-splenomegaly, no signs of chronic liver disease  EXTEREMITIES:  no cyanosis,clubbing or edema  SKIN:  No rash/erythema/ecchymoses/petechiae/wounds/abscess/warm/dry  NEURO:  Alert, oriented x 3, no asterixis, no tremor, no encephalopathy    Vital Signs:  Vital Signs Last 24 Hrs  T(C): 36.4 (26 Sep 2017 08:00), Max: 36.7 (25 Sep 2017 20:30)  T(F): 97.5 (26 Sep 2017 08:00), Max: 98.1 (25 Sep 2017 20:30)  HR: 79 (26 Sep 2017 11:00) (76 - 103)  BP: --  BP(mean): 80 (26 Sep 2017 11:00) (80 - 86)  RR: 14 (26 Sep 2017 11:00) (13 - 42)  SpO2: 97% (26 Sep 2017 11:00) (93% - 100%)  Daily Height in cm: 172.72 (25 Sep 2017 20:30)    Daily Weight in k.7 (26 Sep 2017 00:00)    LABS:                        9.2    11.0  )-----------( 219      ( 26 Sep 2017 05:40 )             26.4   Hemoglobin: 9.2 g/dL (17 @ 05:40)  Hemoglobin: 7.1 g/dL (17 @ 01:50)  Hemoglobin: 5.4 g/dL (17 @ 21:52)    Mean Cell Volume: 87.5 fl (17 @ 05:40)        135  |  105  |  39<H>  ----------------------------<  93  4.3   |  17<L>  |  1.03    Ca    8.6      26 Sep 2017 01:51  Phos  3.8       Mg     2.1         TPro  6.2  /  Alb  2.9<L>  /  TBili  0.5  /  DBili  x   /  AST  24  /  ALT  20  /  AlkPhos  65      PT/INR - ( 26 Sep 2017 01:50 )   PT: 25.0 sec;   INR: 2.28 ratio           Urinalysis Basic - ( 25 Sep 2017 22:11 )    Color: x / Appearance: Clear / S.017 / pH: x  Gluc: x / Ketone: Negative  / Bili: Negative / Urobili: Negative   Blood: x / Protein: Negative / Nitrite: Negative   Leuk Esterase: Negative / RBC: x / WBC x   Sq Epi: x / Non Sq Epi: x / Bacteria: x      Amylase Serum75      Lipase serum--       Ammonia--                          9.2    11.0  )-----------( 219      ( 26 Sep 2017 05:40 )             26.4                         7.1    14.0  )-----------( 257      ( 26 Sep 2017 01:50 )             20.6                         5.4    13.3  )-----------( 295      ( 25 Sep 2017 21:52 )             16.8     Imaging:    < from: Enteroscopy (17 @ 12:06) >  [facility]  ____________________________________________________________________________________________________  Patient Name: Yoseph Baez                       MRN: [id number]  Account Number: [id number]                     YOB: 1950  Room: Endoscopy Room 2                           Gender: Male  Attending MD: Quentin James MD                    Procedure Date No Time: 2017  ____________________________________________________________________________________________________     Procedure:           Upper endoscopy/Push Enteroscopy  Indications:         Gastrointestinal bleeding (melena), Blood loss anemia, Small bowel capsule                        showing angioectasias in proximal small bowel, LVAD in place.  Providers:           Quentin James MD, Christos Pickett MD (Fellow)  Referring MD:        Ayaz Barr  Medicines:           Monitored Anesthesia Care  Complications:       No immediate complications.  ____________________________________________________________________________________________________  Procedure:           Pre-Anesthesia Assessment:                       - Prior to the procedure, a History and Physical was performed, and patient                        medications, allergies and sensitivities were reviewed. The patient's                        tolerance of previous anesthesia was reviewed.                       - The risks and benefits of the procedure and the sedation options and risks                        were discussed with the patient. All questions were answered and informed                        consent was obtained.                       - Patient identification and proposed procedure were verified prior to the                        procedure by the physician. The procedure was verified in the endoscopy suite.                       After obtaining informed consent, the endoscope was passed under direct                        vision. Throughout the procedure, the patient's blood pressure, pulse, and                        oxygen saturations were monitored continuously. The pediatric colonoscope was                        introduced through the mouth and advanced to the proximal/mid jejunum. The                        small bowel enteroscopy wasaccomplished without difficulty. The patient                        tolerated the procedure well.                                                                                                        Findings:       The examined esophagus was normal.       A few, non-bleeding erosions were found in the stomach.       Three nonbleeding angioectasias measuring up to 3 mm were found in the proximal-mid jejunum.        Coagulation for hemostasis using argon plasma at 0.5 liters/minute and 20 raymond was        successful.                                                                                                        Impression:          - Non-bleeding erosive gastropathy.                       - Three non-bleeding angiodysplastic lesions inthe jejunum. Treated with                        argon plasma coagulation (APC).  Recommendation:      - [facility].                       - Clear liquid diet, advance tomorrow if feeling well.     - Monitor H/H, transfuse accordingly    < end of copied text >      < from: Enteroscopy (17 @ 12:08) >  [facility]  ____________________________________________________________________________________________________  Patient Name: Yoseph Baez                       MRN: [id number]  Account Number: [id number]                     YOB: 1950  Room: Endoscopy Room 1                           Gender: Male  Attending MD: DOMINIK PASCUAL MD                 Procedure Date No Time: 2017  ____________________________________________________________________________________________________     Procedure:           Small bowel enteroscopy  Indications:         Melena  Providers:           DOMINIK PASCUAL MD, Christos Pickett MD (Fellow)  Referring MD:        Ayaz Barr  Medicines:           Monitored Anesthesia Care  Complications:       No immediate complications.  ____________________________________________________________________________________________________  Procedure:           Pre-Anesthesia Assessment:                       - Prior to the procedure, a History and Physical was performed, and patient                        medications, allergies and sensitivities were reviewed. The patient's                        tolerance of previous anesthesia was reviewed.                       - The risks and benefits of the procedure and the sedation options and risks                        were discussed with the patient. All questions were answered and informed                        consent was obtained.                       - Patient identificationand proposed procedure were verified prior to the   Surgical Pathology Report (17 @ 13:00)    Surgical Pathology Report:   ACCESSION No:  [id number]    YOSEPH BAEZ 2        Surgical Final Report          Final Diagnosis    1. Stomach, antrum, biopsy:  - Gastric antral mucosa with intestinal metaplasia and  chronic gastritis.  - Negative for dysplasia.  - Negative for Helicobacter pylori (special stain).    2. Gastric ulcer, biopsy:  - Gastric antral-oxyntic mucosa with intestinal metaplasia  in background of  ulceration and chronic active gastritis.  - Negative for dysplasia (additional levels x3 examined).  - Negative for Helicobacter pylori (special stain and  immunohistochemistry).    Slide(s) with built in immunohistochemical study control(s)  associated and negative control with this case has/have been  verified by the sign out pathologist.  These immunohistochemical/ in-situ hybridization tests have been  developed and their performance characteristics determined by  [facility], Department of  Pathology, Division of Immunopathology, [phone] [place] [id number].  It has not been cleared or approved by the  [place] Food and Drug Administration.  The FDA has determined that  such clearance or approval is not necessary.  This test is used  for clinical purposes.  The laboratory iscertified under the  CLIA-88 as qualified to perform high  complexity clinical testing.    Ariel Johnson M.D.  (Electronic Signature)  Reported on: 17    Clinical History  66 years old with CAF, gastric ulcer.  EGD    Specimen(s) Submitted  1-Antrum biopsy  2- Gastric ulcer    Gross Description  17 17:55  1.   The specimen is received in formalin and the specimen  container is labeled: Antrum.  It consists of one fragments of  tan-pink, soft tissue, measuring 0.3 cm in greatest dimension.  Entirely submitted.  One cassette.            YOSEPH BAEZ                         2        Surgical Final Report            2.   The specimen is received in formalin and the specimen  container is labeled: Gastric ulcer.  It consists of three  fragments of tan-pink, soft tissue, ranging from 0.1-0.3  cm in  greatest dimension.  Entirely submitted.  One cassette.    In addition to other data that may appear on the specimen  containers, all labels have been inspected to confirm the  presenceof the patient's name and date of birth.    XW  Thu  2017 05:56 PM                         procedure by the physician. The procedure was verified in the endoscopy suite.                       After obtaining informed consent, the endoscope was passed under direct                     vision. Throughout the procedure, the patient's blood pressure, pulse, and                        oxygen saturations were monitored continuously. The Colonoscope was                        introduced through the mouth and advanced to the proximal jejunum. The small                        bowel enteroscopy was accomplished without difficulty. The patient tolerated                        the procedure well.                              Findings:       The examined esophagus was normal.       The entire examined stomach was normal.       There was no evidence of significant pathology in the entire examined duodenum.       Two angioectasias with no bleeding were found in the proximal jejunum. Coagulation for        hemostasis using bipolar probe was successful.                                                                                                        Impression:          - Normal esophagus,stomach and duodenum.                       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 Treated with bipolar cautery.                       - No specimens collected.  Recommendation:      - [facility].                  - Plan for small bowel capsule study today.

## 2017-09-26 NOTE — CONSULT NOTE ADULT - SUBJECTIVE AND OBJECTIVE BOX
CHIEF COMPLAINT: melena    HISTORY OF PRESENT ILLNESS:  67M pmhx of NICM with LVEF 20%, ACC/AHA stage sp HMII LVAD 6/12/2017 as BTD, prior VT on amiodarone and mexilitine w/ [employer] dual lead ICD, prior GIB 2/2 AVMs sp clipping/APCs last on 8/28/17 where he underwent enteroscopy with 3AVMS APCed, and discharged with INR goal of 1.5-2 on coumadin off asa, wo changes to pump speed who represented with melena and hgb drop. Noted to have dark stool over the for the last 3-4 days, with routine labs drawn shwoing hgb drop from 8.7 to 5.4 INR was 2.28.   Notably last saw Dr Stahl where he was continued on goal INR 1.8-2 with coumadin 3/4mg and continued on coreg 12.5mg bid. opther cardiac meds are mexilitine 150mg tid, aldactone 25, amio 200 qd, lasix 20 eod, protonix 40, quetiapine 50 qhs.     Device Type: HM2  Flow: 5.0 L/min  Speed: 9200 RPM  Power: 5.4 [place]  PI: 7.0  MAP: 94 Patient pulsetile /86    Allergies    No Known Allergies    Intolerances    	    MEDICATIONS:  amiodarone    Tablet 200 milliGRAM(s) Oral daily  carvedilol 6.25 milliGRAM(s) Oral every 12 hours  furosemide    Tablet 20 milliGRAM(s) Oral every other day  mexiletine 150 milliGRAM(s) Oral every 8 hours        QUEtiapine 50 milliGRAM(s) Oral daily  acetaminophen   Tablet 650 milliGRAM(s) Oral every 8 hours PRN    pantoprazole  Injectable 40 milliGRAM(s) IV Push every 12 hours      ferrous    sulfate 325 milliGRAM(s) Oral daily  folic acid 1 milliGRAM(s) Oral daily  ascorbic acid 500 milliGRAM(s) Oral daily  sodium chloride 0.9%. 1000 milliLiter(s) IV Continuous <Continuous>      PAST MEDICAL & SURGICAL HISTORY:  GIB (gastrointestinal bleeding)  Ventricular fibrillation: s/p AICD  PAF (paroxysmal atrial fibrillation): on xarelto  Non-Ischemic Cardiomyopathy  SVT (Supraventricular Tachycardia)  HTN  CHF (Congestive Heart Failure)  LVAD (left ventricular assist device) present  Status post left hip replacement  History of Prior Ablation Treatment: for afib  AICD (Automatic Cardioverter/Defibrillator) Present: [employer] with 1 [employer] lead4/1/09- explanted and replaced with [employer] 2 leads on 9/2/09      FAMILY HISTORY:  No pertinent family history in first degree relatives      SOCIAL HISTORY:    [ ] Non-smoker  [ ] Smoker  [ ] Alcohol      REVIEW OF SYSTEMS:  General: no fatigue/malaise, weight loss/gain.  Skin: no rashes.  Ophthalmologic: no blurred vision, no loss of vision. 	  ENT: no sore throat, rhinorrhea, sinus congestion.  Respiratory: no SOB, cough or wheeze.  Gastrointestinal:  no N/V/D, no melena/hematemesis/hematochezia.  Genitourinary: no dysuria/hesitancy or hematuria.  Musculoskeletal: no myalgias or arthralgias.  Neurological: no changes in vision or hearing, no lightheadedness/dizziness, no syncope/near syncope	  Psychiatric: no unusual stress/anxiety.   Hematology/Lymphatics: no unusual bleeding, bruising and no lymphadenopathy.  Endocrine: no unusual thirst.   All others negative except as stated above and in HPI.    PHYSICAL EXAM:  T(C): 36.4 (09-26-17 @ 08:00), Max: 36.7 (09-25-17 @ 20:30)  HR: 80 (09-26-17 @ 09:00) (76 - 103)  BP: --  RR: 18 (09-26-17 @ 09:00) (17 - 42)  SpO2: 100% (09-26-17 @ 09:00) (93% - 100%)  Wt(kg): --  I&O's Summary    25 Sep 2017 07:01  -  26 Sep 2017 07:00  --------------------------------------------------------  IN: 1320 mL / OUT: 1100 mL / NET: 220 mL    26 Sep 2017 07:01  -  26 Sep 2017 10:27  --------------------------------------------------------  IN: 40 mL / OUT: 500 mL / NET: -460 mL        Appearance: Normal	  HEENT:   Normal oral mucosa, PERRL, EOMI	  Lymphatic: No lymphadenopathy  Cardiovascular: Normal S1 S2, No JVD, No murmurs, No edema  Respiratory: Lungs clear to auscultation	  Psychiatry: A & O x 3, Mood & affect appropriate  Gastrointestinal:  Soft, Non-tender, + BS	  Skin: No rashes, No ecchymoses, No cyanosis	  Neurologic: Non-focal  Extremities: Normal range of motion, No clubbing, cyanosis or edema  Vascular: Peripheral pulses palpable 2+ bilaterally        LABS:	 	    CBC Full  -  ( 26 Sep 2017 05:40 )  WBC Count : 11.0 K/uL  Hemoglobin : 9.2 g/dL  Hematocrit : 26.4 %  Platelet Count - Automated : 219 K/uL  Mean Cell Volume : 87.5 fl  Mean Cell Hemoglobin : 30.5 pg  Mean Cell Hemoglobin Concentration : 34.9 gm/dL  Auto Neutrophil # : 7.4 K/uL  Auto Lymphocyte # : 1.6 K/uL  Auto Monocyte # : 2.0 K/uL  Auto Eosinophil # : 0.1 K/uL  Auto Basophil # : 0.0 K/uL  Auto Neutrophil % : 73.0 %  Auto Lymphocyte % : 11.0 %  Auto Monocyte % : 14.0 %  Auto Eosinophil % : 1.0 %  Auto Basophil % : x    09-26    135  |  105  |  39<H>  ----------------------------<  93  4.3   |  17<L>  |  1.03  09-25    135  |  105  |  43<H>  ----------------------------<  103<H>  4.4   |  18<L>  |  1.18    Ca    8.6      26 Sep 2017 01:51  Ca    8.6      25 Sep 2017 21:52  Phos  3.8     09-25  Mg     2.1     09-25    TPro  6.2  /  Alb  2.9<L>  /  TBili  0.5  /  DBili  x   /  AST  24  /  ALT  20  /  AlkPhos  65  09-26  TPro  7.1  /  Alb  3.4  /  TBili  0.3  /  DBili  x   /  AST  26  /  ALT  24  /  AlkPhos  72  09-25    < from: Transthoracic Echocardiogram (07.12.17 @ 16:09) >  imensions:    Normal Values:  LA:            2.0 - 4.0 cm  Ao:            2.0 - 3.8 cm  SEPTUM: 0.8    0.6 - 1.2 cm  PWT:    0.8    0.6 - 1.1 cm  LVIDd:  6.3    3.0 - 5.6 cm  LVIDs:         1.8 - 4.0 cm  Derived variables:  LVMI: 108 g/m2  RWT: 0.25  ------------------------------------------------------------------------  Observations:  Mitral Valve: Tethered mitral valve leaflets with normal  opening. Mild mitral regurgitation.  Aortic Valve/Aorta: Aortic valve not well visualized;  appears trileaflet. The valve opens with some heart beats.  Minimal aortic regurgitation.  Left Atrium: Mild left atrial enlargement.  Left Ventricle: Endocardium not well visualized; severe  left ventricular systolic dysfunction. Abnormal motion of  the interventricular septum. LVAD inflow cannula present in  the apex is not well visualized. LVIDd=6.3 cm.  Right Heart: Mild right atrial enlargement. The right  ventricle is not well visualized; right ventricle appears  mildly enlarged with decreased right ventricular systolic  function. A device wire is noted in the right heart.  Tricuspid valve not well visualized, probably normal. Mild  tricuspid regurgitation. Normal pulmonic valve.  Pericardium/Pleura: Normal pericardium with no pericardial  effusion.  Hemodynamic: Estimated right atrial pressure is 8 mm Hg.  Estimated right ventricular systolic pressure equals 28 mm  Hg, assuming right atrial pressure equals 8 mm Hg,  consistent with normal pulmonary pressures.  ------------------------------------------------------------------------  Conclusions:  LVAD output 9200 rpm. Limited study performed at the  bedside with heart failure team present. No changes made to  LVAD settings.  1. Aortic valve not well visualized; appears trileaflet.  The valve opens with some heart beats. Minimal aortic  regurgitation.  2. LVIDd=6.3 cm.  3. Endocardium not well visualized; severe left ventricular  systolic dysfunction. Abnormal motion of the  interventricular septum. LVAD inflow cannula present in the  apex is not well visualized.  4. The right ventricle is not well visualized; right  ventricle appears mildly enlarged with decreased right  ventricular systolic function. A device wire is noted in  the right heart.    < end of copied text >

## 2017-09-26 NOTE — PROVIDER CONTACT NOTE (CRITICAL VALUE NOTIFICATION) - BACKGROUND
patient readmit to CTU tonight for low Hct
admitted for blood in the stool, currently no coumadin for the LVAD

## 2017-09-26 NOTE — CONSULT NOTE ADULT - ATTENDING COMMENTS
GIB, Hgb dropped to 5.4; was transfused 2U PRBC with appropriate response.  MAP 80; feeling well.  EGD and enteroscopy planned for tomorrow.  Had 1 PI event on Sunday. NPO for procedure. would d/c Lasix.

## 2017-09-26 NOTE — CONSULT NOTE ADULT - ASSESSMENT
Impression:  1)Acute blood loss anemia (melena)- upper GI source, differential includes small bowel angioectasis, PUD, dieulafoy lesion  2)Severe systolic CHF s/p LVAD on anticoagulation    Plan:  1)Monitor Hgb daily and bowel movements   2)Can place on clear liquid diet today, NPO past MN  3)Plan for EGD and Enteroscopy tomorrow- will need cardiac anesthesia and LVAD coordinator present   4)c/w PPI PO daily   5)A/C per CHF/CT ICU team     c/w CT ICU team    Please call with questions [phone]

## 2017-09-27 LAB
ALBUMIN SERPL ELPH-MCNC: 2.9 G/DL — LOW (ref 3.3–5)
ALBUMIN SERPL ELPH-MCNC: 3 G/DL — LOW (ref 3.3–5)
ALP SERPL-CCNC: 67 U/L — SIGNIFICANT CHANGE UP (ref 40–120)
ALP SERPL-CCNC: 93 U/L — SIGNIFICANT CHANGE UP (ref 40–120)
ALT FLD-CCNC: 23 U/L RC — SIGNIFICANT CHANGE UP (ref 10–45)
ALT FLD-CCNC: 36 U/L RC — SIGNIFICANT CHANGE UP (ref 10–45)
ANION GAP SERPL CALC-SCNC: 13 MMOL/L — SIGNIFICANT CHANGE UP (ref 5–17)
ANION GAP SERPL CALC-SCNC: 15 MMOL/L — SIGNIFICANT CHANGE UP (ref 5–17)
APTT BLD: 31.9 SEC — SIGNIFICANT CHANGE UP (ref 27.5–37.4)
AST SERPL-CCNC: 30 U/L — SIGNIFICANT CHANGE UP (ref 10–40)
AST SERPL-CCNC: 55 U/L — HIGH (ref 10–40)
BILIRUB SERPL-MCNC: 0.5 MG/DL — SIGNIFICANT CHANGE UP (ref 0.2–1.2)
BILIRUB SERPL-MCNC: 0.9 MG/DL — SIGNIFICANT CHANGE UP (ref 0.2–1.2)
BUN SERPL-MCNC: 19 MG/DL — SIGNIFICANT CHANGE UP (ref 7–23)
BUN SERPL-MCNC: 21 MG/DL — SIGNIFICANT CHANGE UP (ref 7–23)
CALCIUM SERPL-MCNC: 8.2 MG/DL — LOW (ref 8.4–10.5)
CALCIUM SERPL-MCNC: 8.8 MG/DL — SIGNIFICANT CHANGE UP (ref 8.4–10.5)
CHLORIDE SERPL-SCNC: 106 MMOL/L — SIGNIFICANT CHANGE UP (ref 96–108)
CHLORIDE SERPL-SCNC: 107 MMOL/L — SIGNIFICANT CHANGE UP (ref 96–108)
CO2 SERPL-SCNC: 17 MMOL/L — LOW (ref 22–31)
CO2 SERPL-SCNC: 19 MMOL/L — LOW (ref 22–31)
CREAT SERPL-MCNC: 1.01 MG/DL — SIGNIFICANT CHANGE UP (ref 0.5–1.3)
CREAT SERPL-MCNC: 1.05 MG/DL — SIGNIFICANT CHANGE UP (ref 0.5–1.3)
GAS PNL BLDA: SIGNIFICANT CHANGE UP
GLUCOSE SERPL-MCNC: 130 MG/DL — HIGH (ref 70–99)
GLUCOSE SERPL-MCNC: 81 MG/DL — SIGNIFICANT CHANGE UP (ref 70–99)
HAPTOGLOB SERPL-MCNC: 154 MG/DL — SIGNIFICANT CHANGE UP (ref 34–200)
HCT VFR BLD CALC: 27.2 % — LOW (ref 39–50)
HCT VFR BLD CALC: 28.9 % — LOW (ref 39–50)
HGB BLD-MCNC: 10 G/DL — LOW (ref 13–17)
HGB BLD-MCNC: 9.2 G/DL — LOW (ref 13–17)
INR BLD: 1.88 RATIO — HIGH (ref 0.88–1.16)
LDH SERPL L TO P-CCNC: 214 U/L — SIGNIFICANT CHANGE UP (ref 50–242)
MCHC RBC-ENTMCNC: 29.9 PG — SIGNIFICANT CHANGE UP (ref 27–34)
MCHC RBC-ENTMCNC: 30.4 PG — SIGNIFICANT CHANGE UP (ref 27–34)
MCHC RBC-ENTMCNC: 33.7 GM/DL — SIGNIFICANT CHANGE UP (ref 32–36)
MCHC RBC-ENTMCNC: 34.5 GM/DL — SIGNIFICANT CHANGE UP (ref 32–36)
MCV RBC AUTO: 88 FL — SIGNIFICANT CHANGE UP (ref 80–100)
MCV RBC AUTO: 88.7 FL — SIGNIFICANT CHANGE UP (ref 80–100)
PHOSPHATE SERPL-MCNC: 3.9 MG/DL — SIGNIFICANT CHANGE UP (ref 2.5–4.5)
PLATELET # BLD AUTO: 254 K/UL — SIGNIFICANT CHANGE UP (ref 150–400)
PLATELET # BLD AUTO: 266 K/UL — SIGNIFICANT CHANGE UP (ref 150–400)
POTASSIUM SERPL-MCNC: 4.1 MMOL/L — SIGNIFICANT CHANGE UP (ref 3.5–5.3)
POTASSIUM SERPL-MCNC: 4.3 MMOL/L — SIGNIFICANT CHANGE UP (ref 3.5–5.3)
POTASSIUM SERPL-SCNC: 4.1 MMOL/L — SIGNIFICANT CHANGE UP (ref 3.5–5.3)
POTASSIUM SERPL-SCNC: 4.3 MMOL/L — SIGNIFICANT CHANGE UP (ref 3.5–5.3)
PROT SERPL-MCNC: 6.4 G/DL — SIGNIFICANT CHANGE UP (ref 6–8.3)
PROT SERPL-MCNC: 6.7 G/DL — SIGNIFICANT CHANGE UP (ref 6–8.3)
PROTHROM AB SERPL-ACNC: 20.8 SEC — HIGH (ref 9.8–12.7)
RBC # BLD: 3.07 M/UL — LOW (ref 4.2–5.8)
RBC # BLD: 3.29 M/UL — LOW (ref 4.2–5.8)
RBC # FLD: 18.3 % — HIGH (ref 10.3–14.5)
RBC # FLD: 18.6 % — HIGH (ref 10.3–14.5)
SODIUM SERPL-SCNC: 138 MMOL/L — SIGNIFICANT CHANGE UP (ref 135–145)
SODIUM SERPL-SCNC: 139 MMOL/L — SIGNIFICANT CHANGE UP (ref 135–145)
WBC # BLD: 10.5 K/UL — SIGNIFICANT CHANGE UP (ref 3.8–10.5)
WBC # BLD: 12.9 K/UL — HIGH (ref 3.8–10.5)
WBC # FLD AUTO: 10.5 K/UL — SIGNIFICANT CHANGE UP (ref 3.8–10.5)
WBC # FLD AUTO: 12.9 K/UL — HIGH (ref 3.8–10.5)

## 2017-09-27 PROCEDURE — 44360 SMALL BOWEL ENDOSCOPY: CPT | Mod: GC

## 2017-09-27 PROCEDURE — 99233 SBSQ HOSP IP/OBS HIGH 50: CPT | Mod: GC

## 2017-09-27 PROCEDURE — 73110 X-RAY EXAM OF WRIST: CPT | Mod: 26,RT

## 2017-09-27 PROCEDURE — 73130 X-RAY EXAM OF HAND: CPT | Mod: 26,RT

## 2017-09-27 RX ORDER — WARFARIN SODIUM 2.5 MG/1
1 TABLET ORAL ONCE
Qty: 0 | Refills: 0 | Status: COMPLETED | OUTPATIENT
Start: 2017-09-27 | End: 2017-09-27

## 2017-09-27 RX ORDER — COLCHICINE 0.6 MG
0.6 TABLET ORAL DAILY
Qty: 0 | Refills: 0 | Status: DISCONTINUED | OUTPATIENT
Start: 2017-09-27 | End: 2017-09-28

## 2017-09-27 RX ADMIN — Medication 650 MILLIGRAM(S): at 19:55

## 2017-09-27 RX ADMIN — AMIODARONE HYDROCHLORIDE 200 MILLIGRAM(S): 400 TABLET ORAL at 17:31

## 2017-09-27 RX ADMIN — Medication 0.6 MILLIGRAM(S): at 19:00

## 2017-09-27 RX ADMIN — PANTOPRAZOLE SODIUM 40 MILLIGRAM(S): 20 TABLET, DELAYED RELEASE ORAL at 19:00

## 2017-09-27 RX ADMIN — SODIUM CHLORIDE 20 MILLILITER(S): 9 INJECTION INTRAMUSCULAR; INTRAVENOUS; SUBCUTANEOUS at 06:37

## 2017-09-27 RX ADMIN — MEXILETINE HYDROCHLORIDE 150 MILLIGRAM(S): 150 CAPSULE ORAL at 21:57

## 2017-09-27 RX ADMIN — QUETIAPINE FUMARATE 50 MILLIGRAM(S): 200 TABLET, FILM COATED ORAL at 21:57

## 2017-09-27 RX ADMIN — CARVEDILOL PHOSPHATE 6.25 MILLIGRAM(S): 80 CAPSULE, EXTENDED RELEASE ORAL at 17:32

## 2017-09-27 RX ADMIN — MEXILETINE HYDROCHLORIDE 150 MILLIGRAM(S): 150 CAPSULE ORAL at 17:32

## 2017-09-27 RX ADMIN — WARFARIN SODIUM 1 MILLIGRAM(S): 2.5 TABLET ORAL at 06:33

## 2017-09-27 RX ADMIN — PANTOPRAZOLE SODIUM 40 MILLIGRAM(S): 20 TABLET, DELAYED RELEASE ORAL at 05:30

## 2017-09-27 NOTE — DIETITIAN INITIAL EVALUATION ADULT. - NS AS NUTRI INTERV MEDICAL AND FOOD SUPPLEMENTS
Recommend Ensure Enlive 2 cans daily with diet advancement to regular, low sodium to optimize PO intake.

## 2017-09-27 NOTE — PROGRESS NOTE ADULT - SUBJECTIVE AND OBJECTIVE BOX
Pre-Endoscopy Evaluation      Referring Physician:   Dr. 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 s/p clipping/APCs last on 17 where he underwent Enteroscopy with 3AVMS s/p APC, and discharged on coumadin off asa, who presented with melena and acute blood loss anemia      Sedation by Anesthesia [X]    PAST MEDICAL & SURGICAL HISTORY:  GIB (gastrointestinal bleeding)  Ventricular fibrillation: s/p AICD  PAF (paroxysmal atrial fibrillation): on xarelto  Non-Ischemic Cardiomyopathy  SVT (Supraventricular Tachycardia)  HTN  CHF (Congestive Heart Failure)  LVAD (left ventricular assist device) present  Status post left hip replacement  History of Prior Ablation Treatment: for afib  AICD (Automatic Cardioverter/Defibrillator) Present: [employer] with 1 [employer] lead09- explanted and replaced with [employer] 2 leads on 09      PMH of Gastroparesis [ ]  Gastric Surgery [ ]  Gastric Outlet Obstruction [ ]    Allergies    No Known Allergies    Intolerances:    Latex allergy: [ ] yes [X] no    Medications:MEDICATIONS  (STANDING):  pantoprazole  Injectable 40 milliGRAM(s) IV Push every 12 hours  amiodarone    Tablet 200 milliGRAM(s) Oral daily  carvedilol 6.25 milliGRAM(s) Oral every 12 hours  ferrous    sulfate 325 milliGRAM(s) Oral daily  folic acid 1 milliGRAM(s) Oral daily  mexiletine 150 milliGRAM(s) Oral every 8 hours  QUEtiapine 50 milliGRAM(s) Oral daily  ascorbic acid 500 milliGRAM(s) Oral daily  sodium chloride 0.9%. 1000 milliLiter(s) (20 mL/Hr) IV Continuous <Continuous>    MEDICATIONS  (PRN):  acetaminophen   Tablet 650 milliGRAM(s) Oral every 8 hours PRN For Temp greater than 38 C (100.4 F)      Smoking: [ ] yes  [X] no    AICD/PPM: [X] yes   [ ] no    Pertinent lab data:                        10.0   10.5  )-----------( 254      ( 27 Sep 2017 02:06 )             28.9         139  |  107  |  19  ----------------------------<  81  4.1   |  19<L>  |  1.01    Ca    8.8      27 Sep 2017 02:06  Phos  3.9       Mg     2.1     -    TPro  6.7  /  Alb  3.0<L>  /  TBili  0.9  /  DBili  x   /  AST  30  /  ALT  23  /  AlkPhos  67      PT/INR - ( 27 Sep 2017 02:06 )   PT: 20.8 sec;   INR: 1.88 ratio      PTT - ( 27 Sep 2017 02:06 )  PTT:31.9 sec    < from: Transthoracic Echocardiogram (17 @ 16:09) >    Patient name: BILLY RUSSELL  YOB: 1950   Age: 67 (M)   MR#: [id number]  Study Date: 2017  Location: [address] [place], [place] [id number]: Alona Rolle RDCS  Study quality: Technically difficult  Referring Physician: Ayaz Barr MD  BloodPressure: 90 mmHg  Height: 173 cm  Weight: 75 kg  BSA: 1.9 m2  ------------------------------------------------------------------------  PROCEDURE: Transthoracic echocardiogram with 2-D, M-Mode  and complete spectral and color flow Doppler.  INDICATION: Heart failure, unspecified (I50.9)  ------------------------------------------------------------------------  Dimensions:    Normal Values:  LA:            2.0 - 4.0 cm  Ao:            2.0 - 3.8 cm  SEPTUM: 0.8    0.6 - 1.2 cm  PWT:    0.8    0.6 - 1.1 cm  LVIDd:  6.3    3.0 - 5.6 cm  LVIDs:         1.8 - 4.0 cm  Derived variables:  LVMI: 108 g/m2  RWT: 0.25  ------------------------------------------------------------------------  Observations:  Mitral Valve: Tethered mitral valve leaflets with normal  opening. Mild mitral regurgitation.  Aortic Valve/Aorta: Aortic valve not well visualized;  appears trileaflet. The valve opens with some heart beats.  Minimal aortic regurgitation.  Left Atrium: Mild left atrial enlargement.  Left Ventricle: Endocardium not well visualized; severe  left ventricular systolic dysfunction. Abnormal motion of  the interventricular septum. LVAD inflow cannula present in  the apex is not well visualized. LVIDd=6.3 cm.  Right Heart: Mild right atrial enlargement. The right  ventricle is not well visualized; right ventricle appears  mildly enlarged with decreased right ventricular systolic  function. A device wire is noted in the right heart.  Tricuspid valve not well visualized, probably normal. Mild  tricuspid regurgitation. Normal pulmonic valve.  Pericardium/Pleura: Normal pericardium with no pericardial  effusion.  Hemodynamic: Estimated right atrial pressure is 8 mm Hg.  Estimated right ventricular systolic pressure equals 28 mm  Hg, assuming right atrial pressure equals 8 mm Hg,  consistent with normal pulmonary pressures.  ------------------------------------------------------------------------  Conclusions:  LVAD output 9200 rpm. Limited study performed at the  bedside with heart failure team present. No changes made to  LVAD settings.  1. Aortic valve not well visualized; appears trileaflet.  The valve opens with some heart beats. Minimal aortic  regurgitation.  2. LVIDd=6.3 cm.  3. Endocardium not well visualized; severe left ventricular  systolic dysfunction. Abnormal motion of the  interventricular septum. LVAD inflow cannula present in the  apex is not well visualized.  4. The right ventricle is not well visualized; right  ventricle appears mildly enlarged with decreased right  ventricular systolic function. A device wire is noted in  the right heart.  ------------------------------------------------------------------------  Confirmed on  2017 - 18:26:06 by Reggie Morton M.D.  ------------------------------------------------------------------------    Physical Examination:  Daily     Daily Weight in k.4 (27 Sep 2017 08:30)  Vital Signs Last 24 Hrs  T(C): 36.4 (27 Sep 2017 08:00), Max: 36.6 (27 Sep 2017 00:00)  T(F): 97.5 (27 Sep 2017 08:00), Max: 97.9 (27 Sep 2017 00:00)  HR: 79 (27 Sep 2017 11:00) (73 - 90)  BP: --  BP(mean): 82 (27 Sep 2017 08:00) (82 - 90)  RR: 23 (27 Sep 2017 11:00) (11 - 30)  SpO2: 95% (27 Sep 2017 11:00) (84% - 100%)      Constitutional: NAD    HEENT: PERRLA, EOMI,       Neck:  No JVD    Respiratory: CTAB/L    Cardiovascular: S1 and S2    Gastrointestinal: BS+, soft, NT/ND    Extremities: No peripheral edema    Neurological: A/O x 3, no focal deficits    Psychiatric: Normal mood, normal affect    : No Rosas    Skin: No rashes    Comments:    ASA Class: I [ ]  II [ ]  III [ ]  IV [X]    The patient is a suitable candidate for the planned procedure unless box checked [ ]  No, explain: Pre-Endoscopy Evaluation      Referring Physician:   Dr. Jose Mcbride                             Procedure: Push Enteroscopy    Indication for Procedure: GIB    Pertinent History: 67 year old male with pmhx of NICM with LVEF 20%, AICD, ACC/AHA stage sp HMII LVAD 2017 as BTD, prior VT on amiodarone and mexilitine, Gastric AVM's, GIB, s/p clipping/APCs last on 17 where he underwent Enteroscopy with 3AVMS s/p APC, and discharged on coumadin, who now presents with melena and acute blood loss anemia      Sedation by Anesthesia [X]    PAST MEDICAL & SURGICAL HISTORY:  GIB (gastrointestinal bleeding)  Ventricular fibrillation: s/p AICD  PAF (paroxysmal atrial fibrillation): on xarelto  Non-Ischemic Cardiomyopathy  SVT (Supraventricular Tachycardia)  HTN  CHF (Congestive Heart Failure)  LVAD (left ventricular assist device) present  Status post left hip replacement  History of Prior Ablation Treatment: for afib  AICD (Automatic Cardioverter/Defibrillator) Present: [employer] with 1 [employer] lead09- explanted and replaced with [employer] 2 leads on 09      PMH of Gastroparesis [ ]  Gastric Surgery [ ]  Gastric Outlet Obstruction [ ]    Allergies    No Known Allergies    Intolerances:    Latex allergy: [ ] yes [X] no    Medications:MEDICATIONS  (STANDING):  pantoprazole  Injectable 40 milliGRAM(s) IV Push every 12 hours  amiodarone    Tablet 200 milliGRAM(s) Oral daily  carvedilol 6.25 milliGRAM(s) Oral every 12 hours  ferrous    sulfate 325 milliGRAM(s) Oral daily  folic acid 1 milliGRAM(s) Oral daily  mexiletine 150 milliGRAM(s) Oral every 8 hours  QUEtiapine 50 milliGRAM(s) Oral daily  ascorbic acid 500 milliGRAM(s) Oral daily  sodium chloride 0.9%. 1000 milliLiter(s) (20 mL/Hr) IV Continuous <Continuous>    MEDICATIONS  (PRN):  acetaminophen   Tablet 650 milliGRAM(s) Oral every 8 hours PRN For Temp greater than 38 C (100.4 F)      Smoking: [ ] yes  [X] no    AICD/PPM: [X] yes   [ ] no    Pertinent lab data:                        10.0   10.5  )-----------( 254      ( 27 Sep 2017 02:06 )             28.9     09-    139  |  107  |  19  ----------------------------<  81  4.1   |  19<L>  |  1.01    Ca    8.8      27 Sep 2017 02:06  Phos  3.9       Mg     2.1     -    TPro  6.7  /  Alb  3.0<L>  /  TBili  0.9  /  DBili  x   /  AST  30  /  ALT  23  /  AlkPhos  67      PT/INR - ( 27 Sep 2017 02:06 )   PT: 20.8 sec;   INR: 1.88 ratio      PTT - ( 27 Sep 2017 02:06 )  PTT:31.9 sec    < from: Transthoracic Echocardiogram (17 @ 16:09) >    Patient name: BILLY RUSSELL  YOB: 1950   Age: 67 (M)   MR#: [id number]  Study Date: 2017  Location: [address] [place], [place] [id number]: Alona Rolle RDCS  Study quality: Technically difficult  Referring Physician: Ayaz Barr MD  BloodPressure: 90 mmHg  Height: 173 cm  Weight: 75 kg  BSA: 1.9 m2  ------------------------------------------------------------------------  PROCEDURE: Transthoracic echocardiogram with 2-D, M-Mode  and complete spectral and color flow Doppler.  INDICATION: Heart failure, unspecified (I50.9)  ------------------------------------------------------------------------  Dimensions:    Normal Values:  LA:            2.0 - 4.0 cm  Ao:            2.0 - 3.8 cm  SEPTUM: 0.8    0.6 - 1.2 cm  PWT:    0.8    0.6 - 1.1 cm  LVIDd:  6.3    3.0 - 5.6 cm  LVIDs:         1.8 - 4.0 cm  Derived variables:  LVMI: 108 g/m2  RWT: 0.25  ------------------------------------------------------------------------  Observations:  Mitral Valve: Tethered mitral valve leaflets with normal  opening. Mild mitral regurgitation.  Aortic Valve/Aorta: Aortic valve not well visualized;  appears trileaflet. The valve opens with some heart beats.  Minimal aortic regurgitation.  Left Atrium: Mild left atrial enlargement.  Left Ventricle: Endocardium not well visualized; severe  left ventricular systolic dysfunction. Abnormal motion of  the interventricular septum. LVAD inflow cannula present in  the apex is not well visualized. LVIDd=6.3 cm.  Right Heart: Mild right atrial enlargement. The right  ventricle is not well visualized; right ventricle appears  mildly enlarged with decreased right ventricular systolic  function. A device wire is noted in the right heart.  Tricuspid valve not well visualized, probably normal. Mild  tricuspid regurgitation. Normal pulmonic valve.  Pericardium/Pleura: Normal pericardium with no pericardial  effusion.  Hemodynamic: Estimated right atrial pressure is 8 mm Hg.  Estimated right ventricular systolic pressure equals 28 mm  Hg, assuming right atrial pressure equals 8 mm Hg,  consistent with normal pulmonary pressures.  ------------------------------------------------------------------------  Conclusions:  LVAD output 9200 rpm. Limited study performed at the  bedside with heart failure team present. No changes made to  LVAD settings.  1. Aortic valve not well visualized; appears trileaflet.  The valve opens with some heart beats. Minimal aortic  regurgitation.  2. LVIDd=6.3 cm.  3. Endocardium not well visualized; severe left ventricular  systolic dysfunction. Abnormal motion of the  interventricular septum. LVAD inflow cannula present in the  apex is not well visualized.  4. The right ventricle is not well visualized; right  ventricle appears mildly enlarged with decreased right  ventricular systolic function. A device wire is noted in  the right heart.  ------------------------------------------------------------------------  Confirmed on  2017 - 18:26:06 by Reggie Morton M.D.  ------------------------------------------------------------------------    Physical Examination:  Daily     Daily Weight in k.4 (27 Sep 2017 08:30)  Vital Signs Last 24 Hrs  T(C): 36.4 (27 Sep 2017 08:00), Max: 36.6 (27 Sep 2017 00:00)  T(F): 97.5 (27 Sep 2017 08:00), Max: 97.9 (27 Sep 2017 00:00)  HR: 79 (27 Sep 2017 11:00) (73 - 90)  BP: --  BP(mean): 82 (27 Sep 2017 08:00) (82 - 90)  RR: 23 (27 Sep 2017 11:00) (11 - 30)  SpO2: 95% (27 Sep 2017 11:00) (84% - 100%)      Constitutional: NAD    HEENT: PERRLA, EOMI,       Neck:  No JVD    Respiratory: CTAB/L    Cardiovascular: S1 and S2    Gastrointestinal: BS+, soft, NT/ND    Extremities: No peripheral edema    Neurological: A/O x 3, no focal deficits    Psychiatric: Normal mood, normal affect    : No Rosas    Skin: No rashes    Comments:    ASA Class: I [ ]  II [ ]  III [ ]  IV [X]    The patient is a suitable candidate for the planned procedure unless box checked [ ]  No, explain:

## 2017-09-27 NOTE — DIETITIAN INITIAL EVALUATION ADULT. - ENERGY NEEDS
Ht:5ft8in, Wt:154.9pounds, BMI:23.6,   IBW:154pounds (+/-10%), 101%IBW  Pt with 1+generalized edema, no pressure ulcers noted.  Pertinent Information: Pt admitted with acute blood loss anemia due to A/V malformation, plan for EGD and enteroscopy as per chart.

## 2017-09-27 NOTE — PROGRESS NOTE ADULT - ASSESSMENT
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      # UGIB likely 2/2 AVMs: MAPS remain elevated at around 80s, and hgb responded appropriately to 2uprbc with stable cbc last 24h suggestive of cessation of bleeding. Last Bm was on Sunday, which was dark. No Bm yesterday/today  - per GI note, for enteroscopy today. LVAD coordinator aware.  - cont trending hgb, monitor BM  - INR 1.88 today from 2.28 yesterday, cont to monitor, pending scope today, will decide redosing after scope, likely slowly with 1mg. INR goal 1.8-2  - protonix 40mg ivp bid    #NICM sp HM LVAD:  - Current settings:  5.1L/M speed 9200 PI 6.3 5.5W  - given hemodynams table at this time with appropriate response in hgb suggestive of resolved bleeding, cont coreg 6.25mg bid,   - would hold aldactone 25, lasix 20 eod at this time given appear euvolemic, and has been with poor PO intake in the last 48 hours with the gib per patient.     # prior VT with [employer] ICD: cont mexilitine 150mg tid, amio 200 qd,  coreg as above    # depression: cont quetiapine 50 qhs.     Claudia Francis MD  CHF  [phone] (After 5pm and on weekends, please call [phone])      outpt setting:  Device Type: HM2  Flow: 5.0 L/min  Speed: 9200 RPM  Power: 5.4 [place]  PI: 7.0  MAP: 94 Patient pulsetile /86

## 2017-09-27 NOTE — DIETITIAN INITIAL EVALUATION ADULT. - FACTORS AFF FOOD INTAKE
Pt states his brother and HHA do the cooking at home, and he helps./difficulty with food procurement/preparation

## 2017-09-27 NOTE — DIETITIAN INITIAL EVALUATION ADULT. - PHYSICAL APPEARANCE
Pt appears thin. Nutrition focused physical exam conducted and pt found with severe muscle wasting at temples and clavicle, moderate muscle wasting at shoulder and interosseous region; moderate fat wasting at ribs, orbital region, and triceps.

## 2017-09-27 NOTE — DIETITIAN INITIAL EVALUATION ADULT. - PERTINENT LABORATORY DATA
Hgb/Hct:10/28.9-low, Na:139, K:4.1, Cl:107, BUN:19, Cr:1.01, Glucose:81, Ca:8.8, Total Prot:6.7, Albumin:3-low, Total Bilirubin:0.9, AlkPhos:67, AST:30, ALT:23, LDH:214, Phos:3.9.

## 2017-09-27 NOTE — DIETITIAN INITIAL EVALUATION ADULT. - OTHER INFO
Pt states weight has been stable at about 151pounds since LVAD placement in June 2017. Pt reports monitoring daily weights and is aware of weight gain parameters for contacting LVAD coordinator. Noted admission weight 154.9pounds, and current weight per chart 152.3pounds, suspect weight changes related to fluid shifts. No known food allergies or intolerances reported. Supplementation PTA consisted of iron, vitamin C, and folic acid. Currently, pt with NPO after midnight for EGD and enteroscopy as per chart. Pt states he tolerated clear liquids well yesterday. Denies difficulty chewing/swallow. Pt reports no nausea/vomiting/diarrhea with last BM passed yesterday. Assessed knowledge of nutrition therapy for CHF and Coumadin/Vitamin K interaction and identified knowledge gaps. Reviewed relationship between diet guidelines and health/disease. Provided further education on therapeutic diet guidelines, pt able to verbalize teach-back points and willing to make identified changes.

## 2017-09-27 NOTE — PROGRESS NOTE ADULT - SUBJECTIVE AND OBJECTIVE BOX
24H hour events:   no events  MAPs remain in gael 80s; hgb remains in the 10s, no further transfusions needed  tele: sinus in gael 80s intermittent PVCS    MEDICATIONS:  amiodarone    Tablet 200 milliGRAM(s) Oral daily  carvedilol 6.25 milliGRAM(s) Oral every 12 hours  mexiletine 150 milliGRAM(s) Oral every 8 hours        QUEtiapine 50 milliGRAM(s) Oral daily  acetaminophen   Tablet 650 milliGRAM(s) Oral every 8 hours PRN    pantoprazole  Injectable 40 milliGRAM(s) IV Push every 12 hours      ferrous    sulfate 325 milliGRAM(s) Oral daily  folic acid 1 milliGRAM(s) Oral daily  ascorbic acid 500 milliGRAM(s) Oral daily  sodium chloride 0.9%. 1000 milliLiter(s) IV Continuous <Continuous>      REVIEW OF SYSTEMS:  General: no fatigue/malaise, weight loss/gain.  Skin: no rashes.  Ophthalmologic: no blurred vision, no loss of vision. 	  ENT: no sore throat, rhinorrhea, sinus congestion.  Respiratory: no SOB, cough or wheeze.  Gastrointestinal:  no N/V/D, no melena/hematemesis/hematochezia.  Genitourinary: no dysuria/hesitancy or hematuria.  Musculoskeletal: no myalgias or arthralgias.  Neurological: no changes in vision or hearing, no lightheadedness/dizziness, no syncope/near syncope	  Psychiatric: no unusual stress/anxiety.   Hematology/Lymphatics: no unusual bleeding, bruising and no lymphadenopathy.  Endocrine: no unusual thirst.   All others negative except as stated above and in HPI.    PHYSICAL EXAM:  T(C): 36.4 (09-27-17 @ 08:00), Max: 36.6 (09-27-17 @ 00:00)  HR: 80 (09-27-17 @ 09:00) (73 - 87)  BP: --  RR: 16 (09-27-17 @ 09:00) (11 - 30)  SpO2: 97% (09-27-17 @ 09:00) (84% - 100%)  Wt(kg): --  I&O's Summary    26 Sep 2017 07:01  -  27 Sep 2017 07:00  --------------------------------------------------------  IN: 1450 mL / OUT: 2450 mL / NET: -1000 mL    27 Sep 2017 07:01  -  27 Sep 2017 09:36  --------------------------------------------------------  IN: 60 mL / OUT: 0 mL / NET: 60 mL        Appearance: Normal	  HEENT:   Normal oral mucosa, PERRL, EOMI	  Lymphatic: No lymphadenopathy  Cardiovascular: Normal S1 S2, No JVD, No murmurs, No edema  Respiratory: Lungs clear to auscultation	  Psychiatry: A & O x 3, Mood & affect appropriate  Gastrointestinal:  Soft, Non-tender, + BS	  Skin: No rashes, No ecchymoses, No cyanosis	  Neurologic: Non-focal  Extremities: Normal range of motion, No clubbing, cyanosis or edema  Vascular: Peripheral pulses palpable 2+ bilaterally        LABS:	 	    CBC Full  -  ( 27 Sep 2017 02:06 )  WBC Count : 10.5 K/uL  Hemoglobin : 10.0 g/dL  Hematocrit : 28.9 %  Platelet Count - Automated : 254 K/uL  Mean Cell Volume : 88.0 fl  Mean Cell Hemoglobin : 30.4 pg  Mean Cell Hemoglobin Concentration : 34.5 gm/dL  Auto Neutrophil # : x  Auto Lymphocyte # : x  Auto Monocyte # : x  Auto Eosinophil # : x  Auto Basophil # : x  Auto Neutrophil % : x  Auto Lymphocyte % : x  Auto Monocyte % : x  Auto Eosinophil % : x  Auto Basophil % : x    09-27    139  |  107  |  19  ----------------------------<  81  4.1   |  19<L>  |  1.01  09-26    135  |  105  |  39<H>  ----------------------------<  93  4.3   |  17<L>  |  1.03    Ca    8.8      27 Sep 2017 02:06  Ca    8.6      26 Sep 2017 01:51  Phos  3.9     09-27  Phos  3.8     09-25  Mg     2.1     09-25    TPro  6.7  /  Alb  3.0<L>  /  TBili  0.9  /  DBili  x   /  AST  30  /  ALT  23  /  AlkPhos  67  09-27  TPro  6.2  /  Alb  2.9<L>  /  TBili  0.5  /  DBili  x   /  AST  24  /  ALT  20  /  AlkPhos  65  09-26

## 2017-09-27 NOTE — PROVIDER CONTACT NOTE (OTHER) - ASSESSMENT
Pt had 13 beats VT; returned to normal sinus rhythm with frequent PVCs. Pt asymptomatic. Pt denies CP, palpitations, shortness of breath. LVAD settings 9200 RPM; 5 pump flow; PI 5.6, Power 5.5

## 2017-09-27 NOTE — DIETITIAN INITIAL EVALUATION ADULT. - NS AS NUTRI INTERV MEALS SNACK
When medically feasible recommend advance diet to clear liquids to regular, low sodium as tolerated. Provide food preferences within therapeutic diet when requested. Reviewed alternate menu options and menu ordering procedure.

## 2017-09-27 NOTE — DIETITIAN INITIAL EVALUATION ADULT. - ADHERENCE
fair/low sodium diet- pt admits to occasionally eating hillman and macaroni and cheese. However, pt states he does not add salt to food and HHA does not cook with salt. Pt states breakfast is usually cereal and eggs. Lunch and dinner is a protein with a starch and low vitamin K vegetables.

## 2017-09-27 NOTE — DIETITIAN INITIAL EVALUATION ADULT. - NUTRITION INTERVENTION
Collaboration and Referral of Nutrition Care/Meals and Snack/Nutrition Education/Medical Food Supplements

## 2017-09-27 NOTE — CHART NOTE - NSCHARTNOTEFT_GEN_A_CORE
Upon Nutritional Assessment by the Registered Dietitian your patient was determined to meet criteria / has evidence of the following diagnosis/diagnoses:          [ ]  Mild Protein Calorie Malnutrition        [ x ]  Moderate Protein Calorie Malnutrition        [ ] Severe Protein Calorie Malnutrition        [ ] Unspecified Protein Calorie Malnutrition        [ ] Underweight / BMI <19        [ ] Morbid Obesity / BMI > 40      Findings as based on:  [ x ] Comprehensive nutrition assessment   [ x ] Nutrition Focused Physical Exam - pt with muscle and fat wasting  [ ] Other:       Nutrition Plan/Recommendations:    Recommend advance diet to clear liquids to regular, low sodium, Ensure Enlive 2 cans daily.       PROVIDER Section:     By signing this assessment you are acknowledging and agree with the diagnosis/diagnoses assigned by the Registered Dietitian    Comments:

## 2017-09-27 NOTE — DIETITIAN INITIAL EVALUATION ADULT. - NS AS NUTRI INTERV ED CONTENT
Reviewed/reinforced nutrition therapy for CHF and Coumadin/Vitamin K interaction- discussed importance of continuing to limit sodium intake, monitor daily weights, and consume vitamin K consistently; written materials provided./Purpose of the nutrition education/Nutrition relationship to health/disease/Recommended modifications

## 2017-09-28 ENCOUNTER — APPOINTMENT (OUTPATIENT)
Dept: CARDIOLOGY | Facility: CLINIC | Age: 67
End: 2017-09-28

## 2017-09-28 DIAGNOSIS — K92.2 GASTROINTESTINAL HEMORRHAGE, UNSPECIFIED: ICD-10-CM

## 2017-09-28 DIAGNOSIS — I50.9 HEART FAILURE, UNSPECIFIED: ICD-10-CM

## 2017-09-28 DIAGNOSIS — M19.90 UNSPECIFIED OSTEOARTHRITIS, UNSPECIFIED SITE: ICD-10-CM

## 2017-09-28 LAB
ALBUMIN SERPL ELPH-MCNC: 3.1 G/DL — LOW (ref 3.3–5)
ALP SERPL-CCNC: 95 U/L — SIGNIFICANT CHANGE UP (ref 40–120)
ALT FLD-CCNC: 39 U/L RC — SIGNIFICANT CHANGE UP (ref 10–45)
ANION GAP SERPL CALC-SCNC: 14 MMOL/L — SIGNIFICANT CHANGE UP (ref 5–17)
APTT BLD: 33.9 SEC — SIGNIFICANT CHANGE UP (ref 27.5–37.4)
AST SERPL-CCNC: 53 U/L — HIGH (ref 10–40)
BILIRUB SERPL-MCNC: 0.5 MG/DL — SIGNIFICANT CHANGE UP (ref 0.2–1.2)
BUN SERPL-MCNC: 19 MG/DL — SIGNIFICANT CHANGE UP (ref 7–23)
CALCIUM SERPL-MCNC: 8.6 MG/DL — SIGNIFICANT CHANGE UP (ref 8.4–10.5)
CHLORIDE SERPL-SCNC: 105 MMOL/L — SIGNIFICANT CHANGE UP (ref 96–108)
CO2 SERPL-SCNC: 20 MMOL/L — LOW (ref 22–31)
CREAT SERPL-MCNC: 1.04 MG/DL — SIGNIFICANT CHANGE UP (ref 0.5–1.3)
CRP SERPL-MCNC: 10.2 MG/DL — HIGH (ref 0–0.4)
GLUCOSE SERPL-MCNC: 88 MG/DL — SIGNIFICANT CHANGE UP (ref 70–99)
HCT VFR BLD CALC: 29.7 % — LOW (ref 39–50)
HGB BLD-MCNC: 9.9 G/DL — LOW (ref 13–17)
INR BLD: 1.75 RATIO — HIGH (ref 0.88–1.16)
MAGNESIUM SERPL-MCNC: 2 MG/DL — SIGNIFICANT CHANGE UP (ref 1.6–2.6)
MCHC RBC-ENTMCNC: 29.7 PG — SIGNIFICANT CHANGE UP (ref 27–34)
MCHC RBC-ENTMCNC: 33.4 GM/DL — SIGNIFICANT CHANGE UP (ref 32–36)
MCV RBC AUTO: 89.1 FL — SIGNIFICANT CHANGE UP (ref 80–100)
PHOSPHATE SERPL-MCNC: 4.3 MG/DL — SIGNIFICANT CHANGE UP (ref 2.5–4.5)
PLATELET # BLD AUTO: 283 K/UL — SIGNIFICANT CHANGE UP (ref 150–400)
POTASSIUM SERPL-MCNC: 4.4 MMOL/L — SIGNIFICANT CHANGE UP (ref 3.5–5.3)
POTASSIUM SERPL-SCNC: 4.4 MMOL/L — SIGNIFICANT CHANGE UP (ref 3.5–5.3)
PREALB SERPL-MCNC: 10 MG/DL — LOW (ref 18–45)
PROT SERPL-MCNC: 6.6 G/DL — SIGNIFICANT CHANGE UP (ref 6–8.3)
PROTHROM AB SERPL-ACNC: 19.1 SEC — HIGH (ref 9.8–12.7)
RBC # BLD: 3.33 M/UL — LOW (ref 4.2–5.8)
RBC # FLD: 18.1 % — HIGH (ref 10.3–14.5)
SODIUM SERPL-SCNC: 139 MMOL/L — SIGNIFICANT CHANGE UP (ref 135–145)
WBC # BLD: 11.3 K/UL — HIGH (ref 3.8–10.5)
WBC # FLD AUTO: 11.3 K/UL — HIGH (ref 3.8–10.5)

## 2017-09-28 PROCEDURE — 93750 INTERROGATION VAD IN PERSON: CPT

## 2017-09-28 PROCEDURE — 99233 SBSQ HOSP IP/OBS HIGH 50: CPT | Mod: 25,GC

## 2017-09-28 PROCEDURE — 90832 PSYTX W PT 30 MINUTES: CPT

## 2017-09-28 RX ORDER — POTASSIUM CHLORIDE 20 MEQ
20 PACKET (EA) ORAL ONCE
Qty: 0 | Refills: 0 | Status: COMPLETED | OUTPATIENT
Start: 2017-09-28 | End: 2017-09-28

## 2017-09-28 RX ORDER — SODIUM CHLORIDE 9 MG/ML
3 INJECTION INTRAMUSCULAR; INTRAVENOUS; SUBCUTANEOUS EVERY 8 HOURS
Qty: 0 | Refills: 0 | Status: DISCONTINUED | OUTPATIENT
Start: 2017-09-28 | End: 2017-10-04

## 2017-09-28 RX ORDER — ACETAMINOPHEN 500 MG
650 TABLET ORAL EVERY 6 HOURS
Qty: 0 | Refills: 0 | Status: DISCONTINUED | OUTPATIENT
Start: 2017-09-28 | End: 2017-10-04

## 2017-09-28 RX ADMIN — Medication 500 MILLIGRAM(S): at 13:38

## 2017-09-28 RX ADMIN — Medication 0.6 MILLIGRAM(S): at 08:01

## 2017-09-28 RX ADMIN — PANTOPRAZOLE SODIUM 40 MILLIGRAM(S): 20 TABLET, DELAYED RELEASE ORAL at 18:48

## 2017-09-28 RX ADMIN — Medication 650 MILLIGRAM(S): at 09:12

## 2017-09-28 RX ADMIN — PANTOPRAZOLE SODIUM 40 MILLIGRAM(S): 20 TABLET, DELAYED RELEASE ORAL at 07:17

## 2017-09-28 RX ADMIN — CARVEDILOL PHOSPHATE 6.25 MILLIGRAM(S): 80 CAPSULE, EXTENDED RELEASE ORAL at 07:17

## 2017-09-28 RX ADMIN — Medication 1 MILLIGRAM(S): at 13:39

## 2017-09-28 RX ADMIN — SODIUM CHLORIDE 3 MILLILITER(S): 9 INJECTION INTRAMUSCULAR; INTRAVENOUS; SUBCUTANEOUS at 21:28

## 2017-09-28 RX ADMIN — MEXILETINE HYDROCHLORIDE 150 MILLIGRAM(S): 150 CAPSULE ORAL at 18:48

## 2017-09-28 RX ADMIN — CARVEDILOL PHOSPHATE 6.25 MILLIGRAM(S): 80 CAPSULE, EXTENDED RELEASE ORAL at 18:47

## 2017-09-28 RX ADMIN — Medication 325 MILLIGRAM(S): at 13:39

## 2017-09-28 RX ADMIN — Medication 20 MILLIEQUIVALENT(S): at 02:21

## 2017-09-28 RX ADMIN — QUETIAPINE FUMARATE 50 MILLIGRAM(S): 200 TABLET, FILM COATED ORAL at 21:29

## 2017-09-28 RX ADMIN — MEXILETINE HYDROCHLORIDE 150 MILLIGRAM(S): 150 CAPSULE ORAL at 09:18

## 2017-09-28 RX ADMIN — SODIUM CHLORIDE 3 MILLILITER(S): 9 INJECTION INTRAMUSCULAR; INTRAVENOUS; SUBCUTANEOUS at 17:44

## 2017-09-28 RX ADMIN — Medication 650 MILLIGRAM(S): at 09:42

## 2017-09-28 RX ADMIN — AMIODARONE HYDROCHLORIDE 200 MILLIGRAM(S): 400 TABLET ORAL at 07:17

## 2017-09-28 NOTE — PROGRESS NOTE ADULT - ASSESSMENT
67M Hx LVAD 6/12/17 readmitted 9/25 with GIB... HCT 18.  Admitted to CTU transfused.  Hx of AV malformation.  His HCT stabilized.  Transferred to [address] [place] [address] 9/28.  Capsule study today.  C/O right wrist/thumb pain.  Xray showed arthritis ?subluxation of thumb.  Orthopedic consult called.

## 2017-09-28 NOTE — PROGRESS NOTE ADULT - SUBJECTIVE AND OBJECTIVE BOX
S:  co right hand/wrist pain.  No SOB.  No overt bleeding    Telemetry:      Vital Signs Last 24 Hrs  T(C): 36.4 (17 @ 09:46), Max: 36.5 (17 @ 16:00)  T(F): 97.5 (17 @ 09:46), Max: 97.7 (17 @ 16:00)  HR: 64 (17 @ 14:08) (64 - 86)  BP: --  RR: 18 (17 @ 14:08) (10 - 28)  SpO2: 97% (17 @ 14:08) (84% - 100%)                    @ 07:01  -   @ 07:00  --------------------------------------------------------  IN: 880 mL / OUT: 900 mL / NET: -20 mL     @ 07:01  -   @ 15:44  --------------------------------------------------------  IN: 960 mL / OUT: 200 mL / NET: 760 mL      Daily Weight in k.4 (28 Sep 2017 04:00)                                9.9  11.3  )-----------( 283      ( 28 Sep 2017 04:40 )             29.7           139  |  105  |  19  ----------------------------<  88  4.4   |  20<L>  |  1.04    Ca    8.6      28 Sep 2017 04:40  Phos  4.3       Mg     2.0         TPro  6.6  /  Alb  3.1<L>  /  TBili  0.5  /  DBili  x   /  AST  53<H>  /  ALT  39  /  AlkPhos  95        PT/INR - ( 28 Sep 2017 04:40 )   PT: 19.1 sec;   INR: 1.75 ratio         PTT - ( 28 Sep 2017 04:40 )  PTT:33.9 sec    PHYSICAL EXAM:    Neurology: alert and oriented x 3, nonfocal, no gross deficits    CV : +  LVAD sounds    Lungs: clear to ausc    Abdomen: soft, nontender, nondistended, positive bowel sounds, last bowel movement     Extremities:     right hand swollen at wrist and deformity of thumb          pantoprazole  Injectable 40 milliGRAM(s) IV Push every 12 hours  amiodarone    Tablet 200 milliGRAM(s) Oral daily  carvedilol 6.25 milliGRAM(s) Oral every 12 hours  ferrous    sulfate 325 milliGRAM(s) Oral daily  folic acid 1 milliGRAM(s) Oral daily  mexiletine 150 milliGRAM(s) Oral every 8 hours  QUEtiapine 50 milliGRAM(s) Oral daily  ascorbic acid 500 milliGRAM(s) Oral daily  acetaminophen   Tablet. 650 milliGRAM(s) Oral every 6 hours PRN                              Physical Therapy Rec:   Home  [  ]   Home w/ PT  [  ]  Rehab  [  ]    Discussed with Cardiothoracic Team at AM rounds.

## 2017-09-28 NOTE — CHART NOTE - NSCHARTNOTEFT_GEN_A_CORE
Risks of video capsule endoscopy explained, including risk of retained capsule, potentially requiring surgery for removal.  Patient understands and agrees to risks. Paper copy of consent in chart.     Capsule ID: [id number]    Capsule swallowed at: 1500    NPO now. Can start clear diet at 1700,  Resume regular consistency diet at: 1900    Equipment can be removed at: 0300 on 9/29/17    GI fellow will retrieve equipment in the morning.    Capsule is NOT MRI compatible.     Please call with questions  Marlin Borrego  GI Fellow  Pager: [phone]

## 2017-09-28 NOTE — PROGRESS NOTE ADULT - SUBJECTIVE AND OBJECTIVE BOX
Behavioral Cardiology Progress Note     HPI:  Mr. Baez is a 68 y/o male with PMH of NICM with LVEF 20%, ACC/AHA stage s/p HMII LVAD 6/12/2017 as BTD, prior VT on amiodarone and mexilitine w/ [employer] dual lead ICD, 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    Behavioral Health Assessment:     Mood:  "I'm doing ok."          Current stressors:   Readmission to hospital   Adjustment to living with LVAD        Support system/family support: Good support from family and close friend      Coping strategies: Talking with family and staff, watching TV; thinking about his granddaughter     Understanding of medical illness and treatment plan:  Understands reasons for this admission and plan.  Good understanding of LVAD management; benefiting from ongoing education and review.      MSE: Pt seen resting in bed.  A&Ox3.  Well related with good eye contact.  Thought process goal directed.  No abnormal thought content; denies s/i.  Mood upbeat.  Affect full range.  Insight and judgment adequate.

## 2017-09-28 NOTE — PROGRESS NOTE ADULT - SUBJECTIVE AND OBJECTIVE BOX
24H hour events:   egd and push enteroscopy done yesterday without source of bleeding found  last bm >24h ago but was "normal" per patient  hgb remains stable at around 9   INR remains at 1.75  MAPS at 90    tele:    MEDICATIONS:  amiodarone    Tablet 200 milliGRAM(s) Oral daily  carvedilol 6.25 milliGRAM(s) Oral every 12 hours  mexiletine 150 milliGRAM(s) Oral every 8 hours        QUEtiapine 50 milliGRAM(s) Oral daily  acetaminophen   Tablet. 650 milliGRAM(s) Oral every 6 hours PRN    pantoprazole  Injectable 40 milliGRAM(s) IV Push every 12 hours      ferrous    sulfate 325 milliGRAM(s) Oral daily  folic acid 1 milliGRAM(s) Oral daily  ascorbic acid 500 milliGRAM(s) Oral daily      REVIEW OF SYSTEMS:  General: no fatigue/malaise, weight loss/gain.  Skin: no rashes.  Ophthalmologic: no blurred vision, no loss of vision. 	  ENT: no sore throat, rhinorrhea, sinus congestion.  Respiratory: no SOB, cough or wheeze.  Gastrointestinal:  no N/V/D, no melena/hematemesis/hematochezia.  Genitourinary: no dysuria/hesitancy or hematuria.  Musculoskeletal: no myalgias or arthralgias.  Neurological: no changes in vision or hearing, no lightheadedness/dizziness, no syncope/near syncope	  Psychiatric: no unusual stress/anxiety.   Hematology/Lymphatics: no unusual bleeding, bruising and no lymphadenopathy.  Endocrine: no unusual thirst.   All others negative except as stated above and in HPI.    PHYSICAL EXAM:  T(C): 36.5 (09-28-17 @ 07:00), Max: 36.5 (09-27-17 @ 12:00)  HR: 74 (09-28-17 @ 08:00) (67 - 90)  BP: --  RR: 20 (09-28-17 @ 08:00) (10 - 29)  SpO2: 100% (09-28-17 @ 08:00) (84% - 100%)  Wt(kg): --  I&O's Summary    27 Sep 2017 07:01  -  28 Sep 2017 07:00  --------------------------------------------------------  IN: 880 mL / OUT: 900 mL / NET: -20 mL    28 Sep 2017 07:01  -  28 Sep 2017 09:06  --------------------------------------------------------  IN: 0 mL / OUT: 200 mL / NET: -200 mL        Appearance: Normal	  HEENT:   Normal oral mucosa, PERRL, EOMI	  Lymphatic: No lymphadenopathy  Cardiovascular: Normal S1 S2, No JVD, No murmurs, No edema  Respiratory: Lungs clear to auscultation	  Psychiatry: A & O x 3, Mood & affect appropriate  Gastrointestinal:  Soft, Non-tender, + BS	  Skin: No rashes, No ecchymoses, No cyanosis	  Neurologic: Non-focal  Extremities: Normal range of motion, No clubbing, cyanosis or edema  Vascular: Peripheral pulses palpable 2+ bilaterally        LABS:	 	    CBC Full  -  ( 28 Sep 2017 04:40 )  WBC Count : 11.3 K/uL  Hemoglobin : 9.9 g/dL  Hematocrit : 29.7 %  Platelet Count - Automated : 283 K/uL  Mean Cell Volume : 89.1 fl  Mean Cell Hemoglobin : 29.7 pg  Mean Cell Hemoglobin Concentration : 33.4 gm/dL  Auto Neutrophil # : x  Auto Lymphocyte # : x  Auto Monocyte # : x  Auto Eosinophil # : x  Auto Basophil # : x  Auto Neutrophil % : x  Auto Lymphocyte % : x  Auto Monocyte % : x  Auto Eosinophil % : x  Auto Basophil % : x    09-28    139  |  105  |  19  ----------------------------<  88  4.4   |  20<L>  |  1.04  09-27    138  |  106  |  21  ----------------------------<  130<H>  4.3   |  17<L>  |  1.05    Ca    8.6      28 Sep 2017 04:40  Ca    8.2<L>      27 Sep 2017 23:39  Phos  4.3     09-28  Phos  3.9     09-27  Mg     2.0     09-28    TPro  6.6  /  Alb  3.1<L>  /  TBili  0.5  /  DBili  x   /  AST  53<H>  /  ALT  39  /  AlkPhos  95  09-28  TPro  6.4  /  Alb  2.9<L>  /  TBili  0.5  /  DBili  x   /  AST  55<H>  /  ALT  36  /  AlkPhos  93  09-27

## 2017-09-28 NOTE — PROGRESS NOTE ADULT - ASSESSMENT
Impression:  1)Acute blood loss anemia (melena)- upper GI source, differential includes small bowel angioectasis, dieulafoy lesion  2)Severe systolic CHF s/p LVAD on anticoagulation    Plan:  1)Monitor Hgb daily and bowel movements   2)Plan for capsule endoscopy today; can have clear liquid diet until 11am today then NPO except medications- plan for capsule deployment at bedside at 1pm.   3)c/w PPI PO daily   4)A/C per CHF/CT ICU team     Please call with questions  Marlin Borrego  GI Fellow  Pager: [phone]

## 2017-09-28 NOTE — PROGRESS NOTE ADULT - SUBJECTIVE AND OBJECTIVE BOX
ADVANCED ENDOSCOPY CONSULT:     Chief Complaint:  Patient is a 67y old  Male who presents with a chief complaint of melena, Anemia. (25 Sep 2017 22:44)    Interval Events:  - s/p Push enteroscopy that was negative for any source of bleeding  - No bowel movements overnight  - no nausea, no vomiting  - c/o right hand swelling and pain with movement        Allergies:  No Known Allergies      Home Medications: reviewed     Hospital Medications:  pantoprazole  Injectable 40 milliGRAM(s) IV Push every 12 hours  amiodarone    Tablet 200 milliGRAM(s) Oral daily  carvedilol 6.25 milliGRAM(s) Oral every 12 hours  ferrous    sulfate 325 milliGRAM(s) Oral daily  folic acid 1 milliGRAM(s) Oral daily  furosemide    Tablet 20 milliGRAM(s) Oral every other day  mexiletine 150 milliGRAM(s) Oral every 8 hours  QUEtiapine 50 milliGRAM(s) Oral daily  acetaminophen   Tablet 650 milliGRAM(s) Oral every 8 hours PRN  ascorbic acid 500 milliGRAM(s) Oral daily  sodium chloride 0.9%. 1000 milliLiter(s) IV Continuous <Continuous>      PMHX/PSHX:  GIB (gastrointestinal bleeding)  H/O prior ablation treatment  Ventricular fibrillation  PAF (paroxysmal atrial fibrillation)  Non-Ischemic Cardiomyopathy  SVT (Supraventricular Tachycardia)  HTN  CHF (Congestive Heart Failure)  LVAD (left ventricular assist device) present  Status post left hip replacement  Hypertension  Hypertension  History of Prior Ablation Treatment  AICD (Automatic Cardioverter/Defibrillator) Present      Family history:  No pertinent family history in first degree relatives      Social History: no current tobacco, ETOH, or IVDA     ROS:     General:  No wt loss, fevers, chills, night sweats, fatigue,   Eyes:  Good vision, no reported pain  ENT:  No sore throat, pain, runny nose, dysphagia  CV:  No pain, palpitations, hypo/hypertension  Resp:  No dyspnea, cough, tachypnea, wheezing  GI:  No pain, No nausea, No vomiting, No diarrhea, No constipation, No weight loss, No fever, No pruritis, No rectal bleeding, + tarry stools, No dysphagia,  :  No pain, bleeding, incontinence, nocturia  Muscle:  +right hand pain   Neuro:  No weakness, tingling, memory problems  Psych:  No fatigue, insomnia, mood problems, depression  Endocrine:  No polyuria, polydipsia, cold/heat intolerance  Heme:  No petechiae, ecchymosis, easy bruisability  Skin:  No rash, tattoos, scars, edema      PHYSICAL EXAM:     GENERAL:  Appears stated age, well-groomed, well-nourished, no distress  HEENT:  NC/AT,  conjunctivae clear and pink, no thyromegaly, nodules, adenopathy, no JVD, sclera -anicteric  CHEST:  Full & symmetric excursion, no increased effort, breath sounds clear  HEART:  +LVAD   ABDOMEN:  Soft, non-tender, non-distended, normoactive bowel sounds,  no masses ,no hepato-splenomegaly, no signs of chronic liver disease  EXTEREMITIES:  no cyanosis,clubbing or edema; right hand with tenderness to palpation   SKIN:  No rash/erythema/ecchymoses/petechiae/wounds/abscess/warm/dry  NEURO:  Alert, oriented x 3, no asterixis, no tremor, no encephalopathy      Vital Signs:  Vital Signs Last 24 Hrs  T(C): 36.5 (28 Sep 2017 07:00), Max: 36.5 (27 Sep 2017 12:00)  T(F): 97.7 (28 Sep 2017 07:00), Max: 97.7 (27 Sep 2017 12:00)  HR: 74 (28 Sep 2017 08:00) (67 - 90)  BP: --  BP(mean): 90 (28 Sep 2017 08:00) (78 - 96)  RR: 20 (28 Sep 2017 08:00) (10 - 29)  SpO2: 100% (28 Sep 2017 08:00) (84% - 100%)  Daily     Daily Weight in k.4 (28 Sep 2017 04:00)    LABS:                        9.9    11.3  )-----------( 283      ( 28 Sep 2017 04:40 )             29.7   Hemoglobin: 9.9 g/dL (17 @ 04:40)  Hemoglobin: 9.2 g/dL (17 @ 23:39)  Hemoglobin: 10.0 g/dL (17 @ 02:06)  Hemoglobin: 10.0 g/dL (17 @ 17:55)  Hemoglobin: 9.8 g/dL (17 @ 11:53)        139  |  105  |  19  ----------------------------<  88  4.4   |  20<L>  |  1.04    Ca    8.6      28 Sep 2017 04:40  Phos  4.3       Mg     2.0         TPro  6.6  /  Alb  3.1<L>  /  TBili  0.5  /  DBili  x   /  AST  53<H>  /  ALT  39  /  AlkPhos  95      LIVER FUNCTIONS - ( 28 Sep 2017 04:40 )  Alb: 3.1 g/dL / Pro: 6.6 g/dL / ALK PHOS: 95 U/L / ALT: 39 U/L RC / AST: 53 U/L / GGT: x           PT/INR - ( 28 Sep 2017 04:40 )   PT: 19.1 sec;   INR: 1.75 ratio         PTT - ( 28 Sep 2017 04:40 )  PTT:33.9 sec    Urinalysis Basic - ( 25 Sep 2017 22:11 )    Color: x / Appearance: Clear / S.017 / pH: x  Gluc: x / Ketone: Negative  / Bili: Negative / Urobili: Negative   Blood: x / Protein: Negative / Nitrite: Negative   Leuk Esterase: Negative / RBC: x / WBC x   Sq Epi: x / Non Sq Epi: x / Bacteria: x      Amylase Serum75      Lipase serum--       Ammonia--      Imaging:    < from: Enteroscopy (17 @ 12:06) >  [facility]  ____________________________________________________________________________________________________  Patient Name: Yoseph Baez                       MRN: [id number]  Account Number: [id number]                     YOB: 1950  Room: Endoscopy Room 2                           Gender: Male  Attending MD: Quentin James MD                    Procedure Date No Time: 2017  ____________________________________________________________________________________________________     Procedure:           Upper endoscopy/Push Enteroscopy  Indications:         Gastrointestinal bleeding (melena), Blood loss anemia, Small bowel capsule                        showing angioectasias in proximal small bowel, LVAD in place.  Providers:           Quentin James MD, Christos Pickett MD (Fellow)  Referring MD:        Ayaz Barr  Medicines:           Monitored Anesthesia Care  Complications:       No immediate complications.  ____________________________________________________________________________________________________  Procedure:           Pre-Anesthesia Assessment:                       - Prior to the procedure, a History and Physical was performed, and patient                        medications, allergies and sensitivities were reviewed. The patient's                        tolerance of previous anesthesia was reviewed.                       - The risks and benefits of the procedure and the sedation options and risks                        were discussed with the patient. All questions were answered and informed                        consent was obtained.                       - Patient identification and proposed procedure were verified prior to the                        procedure by the physician. The procedure was verified in the endoscopy suite.                       After obtaining informed consent, the endoscope was passed under direct                        vision. Throughout the procedure, the patient's blood pressure, pulse, and                        oxygen saturations were monitored continuously. The pediatric colonoscope was                        introduced through the mouth and advanced to the proximal/mid jejunum. The                        small bowel enteroscopy wasaccomplished without difficulty. The patient                        tolerated the procedure well.                                                                                                        Findings:       The examined esophagus was normal.       A few, non-bleeding erosions were found in the stomach.       Three nonbleeding angioectasias measuring up to 3 mm were found in the proximal-mid jejunum.        Coagulation for hemostasis using argon plasma at 0.5 liters/minute and 20 raymond was        successful.                                                                                                        Impression:          - Non-bleeding erosive gastropathy.                       - Three non-bleeding angiodysplastic lesions inthe jejunum. Treated with                        argon plasma coagulation (APC).  Recommendation:      - Return patient to [facility] for ongoing care.                       - Clear liquid diet, advance tomorrow if feeling well.     - Monitor H/H, transfuse accordingly    < end of copied text >      < from: Enteroscopy (17 @ 12:08) >  [facility]  ____________________________________________________________________________________________________  Patient Name: Yoseph Baez                       MRN: [id number]  Account Number: [id number]                     YOB: 1950  Room: Endoscopy Room 1                           Gender: Male  Attending MD: DOMINIK PASCUAL MD                 Procedure Date No Time: 2017  ____________________________________________________________________________________________________     Procedure:           Small bowel enteroscopy  Indications:         Melena  Providers:           DOMINIK PASCUAL MD, Christos Pickett MD (Fellow)  Referring MD:        Ayaz Barr  Medicines:           Monitored Anesthesia Care  Complications:       No immediate complications.  ____________________________________________________________________________________________________  Procedure:           Pre-Anesthesia Assessment:                       - Prior to the procedure, a History and Physical was performed, and patient                        medications, allergies and sensitivities were reviewed. The patient's                        tolerance of previous anesthesia was reviewed.                       - The risks and benefits of the procedure and the sedation options and risks                        were discussed with the patient. All questions were answered and informed                        consent was obtained.                       - Patient identificationand proposed procedure were verified prior to the   Surgical Pathology Report (17 @ 13:00)    Surgical Pathology Report:   ACCESSION No:  [id number]    YOSEPH BAEZ                         2        Surgical Final Report          Final Diagnosis    1. Stomach, antrum, biopsy:  - Gastric antral mucosa with intestinal metaplasia and  chronic gastritis.  - Negative for dysplasia.  - Negative for Helicobacter pylori (special stain).    2. Gastric ulcer, biopsy:  - Gastric antral-oxyntic mucosa with intestinal metaplasia  in background of  ulceration and chronic active gastritis.  - Negative for dysplasia (additional levels x3 examined).  - Negative for Helicobacter pylori (special stain and  immunohistochemistry).    Slide(s) with built in immunohistochemical study control(s)  associated and negative control with this case has/have been  verified by the sign out pathologist.  These immunohistochemical/ in-situ hybridization tests have been  developed and their performance characteristics determined by  [facility], Department of  Pathology, Division of Immunopathology, [address] [place] [address].  It has not been cleared or approved by the  U.S. Food and Drug Administration.  The FDA has determined that  such clearance or approval is not necessary.  This test is used  for clinical purposes.  The laboratory iscertified under the  CLIA-88 as qualified to perform high  complexity clinical testing.    Ariel Johnson M.D.  (Electronic Signature)  Reported on: 17    Clinical History  66 years old with CAF, gastric ulcer.  EGD    Specimen(s) Submitted  1-Antrum biopsy  2- Gastric ulcer    Gross Description  17 17:55  1.   The specimen is received in formalin and the specimen  container is labeled: Antrum.  It consists of one fragments of  tan-pink, soft tissue, measuring 0.3 cm in greatest dimension.  Entirely submitted.  One cassette.            YOSEPH BAEZ                         2        Surgical Final Report            2.   The specimen is received in formalin and the specimen  container is labeled: Gastric ulcer.  It consists of three  fragments of tan-pink, soft tissue, ranging from 0.1-0.3  cm in  greatest dimension.  Entirely submitted.  One cassette.    In addition to other data that may appear on the specimen  containers, all labels have been inspected to confirm the  presenceof the patient's name and date of birth.    XW  Thu  2017 05:56 PM                         procedure by the physician. The procedure was verified in the endoscopy suite.                       After obtaining informed consent, the endoscope was passed under direct                     vision. Throughout the procedure, the patient's blood pressure, pulse, and                        oxygen saturations were monitored continuously. The Colonoscope was                        introduced through the mouth and advanced to the proximal jejunum. The small                        bowel enteroscopy was accomplished without difficulty. The patient tolerated                        the procedure well.                              Findings:       The examined esophagus was normal.       The entire examined stomach was normal.       There was no evidence of significant pathology in the entire examined duodenum.       Two angioectasias with no bleeding were found in the proximal jejunum. Coagulation for        hemostasis using bipolar probe was successful.                                                                                                        Impression:          - Normal esophagus,stomach and duodenum.                       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 Treated with bipolar cautery.                       - No specimens collected.  Recommendation:      - Return patient to [facility] for ongoing care.                  - Plan for small bowel capsule study today.

## 2017-09-28 NOTE — PROGRESS NOTE ADULT - ASSESSMENT
Reports he is "doing better."  Talked about events leading up to his hospitalization Monday.  Reports he followed appropriate guidelines (monitoring symptoms, calling LVAD coordinator with changes).  Continues to develop confidence in his ability to monitor his health and manage LVAD procedures.   Has continued to remain in contact with team.   Has strong support from brother and close friend and has been more willing to ask others for help and support.  Mood upbeat.  Affect full range.  Sleeping ok.   Appetite good.       DX:  Systolic heart failure; r/o Adjustment disorder with anxiety/depression     Recommendations:   Behavioral Cardiology will continue to follow

## 2017-09-28 NOTE — PROGRESS NOTE ADULT - ASSESSMENT
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      # UGIB likely 2/2 AVMs: MAPS remain elevated at around 80s, and hgb responded appropriately to 2uprbc with stable cbc last 24h suggestive of cessation of bleeding. Last Bm was on Monday which was normal per patient. sp EGD push enteroscopy 9/27 with no sourceo f bleeding found; pending pill capsule study  - cont trending hgb, monitor BM  - will monitor off coumadin for now  - protonix 40mg ivp bid  - will arrange for ramp study today to decrease speed to allow more pulsatility to hopefully decrease formation of AVMS. [facility]    #NICM sp HM LVAD:  - Current settings:  5.1L/M speed 9200 PI 6.3 5.5W  - ramp study as above  - given hemodynams table at this time with appropriate response in hgb suggestive of resolved bleeding, cont coreg 6.25mg bid,   - would hold aldactone 25, lasix 20 eod at this time given appear euvolemic, and has been with poor PO intake in the last 48 hours with the gib per patient.     # prior VT with [facility] ICD: cont mexilitine 150mg tid, amio 200 qd,  coreg as above    # depression: cont quetiapine 50 qhs.     Claudia Francis MD  CHF  [phone] (After 5pm and on weekends, please call [phone])      outpt setting:  Device Type: HM2  Flow: 5.0 L/min  Speed: 9200 RPM  Power: 5.4 [place]  PI: 7.0  MAP: 94 Patient pulsetile /86

## 2017-09-29 DIAGNOSIS — K92.2 GASTROINTESTINAL HEMORRHAGE, UNSPECIFIED: ICD-10-CM

## 2017-09-29 DIAGNOSIS — M10.9 GOUT, UNSPECIFIED: ICD-10-CM

## 2017-09-29 DIAGNOSIS — I50.33 ACUTE ON CHRONIC DIASTOLIC (CONGESTIVE) HEART FAILURE: ICD-10-CM

## 2017-09-29 LAB
ANION GAP SERPL CALC-SCNC: 11 MMOL/L — SIGNIFICANT CHANGE UP (ref 5–17)
BUN SERPL-MCNC: 17 MG/DL — SIGNIFICANT CHANGE UP (ref 7–23)
CALCIUM SERPL-MCNC: 8.8 MG/DL — SIGNIFICANT CHANGE UP (ref 8.4–10.5)
CHLORIDE SERPL-SCNC: 105 MMOL/L — SIGNIFICANT CHANGE UP (ref 96–108)
CO2 SERPL-SCNC: 20 MMOL/L — LOW (ref 22–31)
CREAT SERPL-MCNC: 1.15 MG/DL — SIGNIFICANT CHANGE UP (ref 0.5–1.3)
GLUCOSE SERPL-MCNC: 103 MG/DL — HIGH (ref 70–99)
HCT VFR BLD CALC: 32.4 % — LOW (ref 39–50)
HGB BLD-MCNC: 10.6 G/DL — LOW (ref 13–17)
INR BLD: 1.52 RATIO — HIGH (ref 0.88–1.16)
LDH SERPL L TO P-CCNC: 254 U/L — HIGH (ref 50–242)
MCHC RBC-ENTMCNC: 29.3 PG — SIGNIFICANT CHANGE UP (ref 27–34)
MCHC RBC-ENTMCNC: 32.7 GM/DL — SIGNIFICANT CHANGE UP (ref 32–36)
MCV RBC AUTO: 89.7 FL — SIGNIFICANT CHANGE UP (ref 80–100)
PLATELET # BLD AUTO: 275 K/UL — SIGNIFICANT CHANGE UP (ref 150–400)
POTASSIUM SERPL-MCNC: 4.2 MMOL/L — SIGNIFICANT CHANGE UP (ref 3.5–5.3)
POTASSIUM SERPL-SCNC: 4.2 MMOL/L — SIGNIFICANT CHANGE UP (ref 3.5–5.3)
PROTHROM AB SERPL-ACNC: 16.7 SEC — HIGH (ref 9.8–12.7)
RBC # BLD: 3.62 M/UL — LOW (ref 4.2–5.8)
RBC # FLD: 18 % — HIGH (ref 10.3–14.5)
SODIUM SERPL-SCNC: 136 MMOL/L — SIGNIFICANT CHANGE UP (ref 135–145)
WBC # BLD: 11.5 K/UL — HIGH (ref 3.8–10.5)
WBC # FLD AUTO: 11.5 K/UL — HIGH (ref 3.8–10.5)

## 2017-09-29 PROCEDURE — 93750 INTERROGATION VAD IN PERSON: CPT

## 2017-09-29 PROCEDURE — 99233 SBSQ HOSP IP/OBS HIGH 50: CPT | Mod: 25

## 2017-09-29 RX ORDER — FUROSEMIDE 40 MG
20 TABLET ORAL EVERY OTHER DAY
Qty: 0 | Refills: 0 | Status: DISCONTINUED | OUTPATIENT
Start: 2017-09-29 | End: 2017-10-04

## 2017-09-29 RX ORDER — SPIRONOLACTONE 25 MG/1
25 TABLET, FILM COATED ORAL DAILY
Qty: 0 | Refills: 0 | Status: DISCONTINUED | OUTPATIENT
Start: 2017-09-29 | End: 2017-10-04

## 2017-09-29 RX ORDER — OCTREOTIDE ACETATE 200 UG/ML
25 INJECTION, SOLUTION INTRAVENOUS; SUBCUTANEOUS
Qty: 500 | Refills: 0 | Status: DISCONTINUED | OUTPATIENT
Start: 2017-09-29 | End: 2017-10-04

## 2017-09-29 RX ORDER — VANCOMYCIN HCL 1 G
1000 VIAL (EA) INTRAVENOUS ONCE
Qty: 0 | Refills: 0 | Status: COMPLETED | OUTPATIENT
Start: 2017-09-29 | End: 2017-09-29

## 2017-09-29 RX ORDER — CARVEDILOL PHOSPHATE 80 MG/1
12.5 CAPSULE, EXTENDED RELEASE ORAL EVERY 12 HOURS
Qty: 0 | Refills: 0 | Status: DISCONTINUED | OUTPATIENT
Start: 2017-09-29 | End: 2017-10-04

## 2017-09-29 RX ADMIN — SODIUM CHLORIDE 3 MILLILITER(S): 9 INJECTION INTRAMUSCULAR; INTRAVENOUS; SUBCUTANEOUS at 06:23

## 2017-09-29 RX ADMIN — CARVEDILOL PHOSPHATE 12.5 MILLIGRAM(S): 80 CAPSULE, EXTENDED RELEASE ORAL at 17:35

## 2017-09-29 RX ADMIN — SPIRONOLACTONE 25 MILLIGRAM(S): 25 TABLET, FILM COATED ORAL at 17:35

## 2017-09-29 RX ADMIN — Medication 650 MILLIGRAM(S): at 07:00

## 2017-09-29 RX ADMIN — OCTREOTIDE ACETATE 5 MICROGRAM(S)/HR: 200 INJECTION, SOLUTION INTRAVENOUS; SUBCUTANEOUS at 15:36

## 2017-09-29 RX ADMIN — MEXILETINE HYDROCHLORIDE 150 MILLIGRAM(S): 150 CAPSULE ORAL at 02:02

## 2017-09-29 RX ADMIN — SODIUM CHLORIDE 3 MILLILITER(S): 9 INJECTION INTRAMUSCULAR; INTRAVENOUS; SUBCUTANEOUS at 22:16

## 2017-09-29 RX ADMIN — AMIODARONE HYDROCHLORIDE 200 MILLIGRAM(S): 400 TABLET ORAL at 06:25

## 2017-09-29 RX ADMIN — Medication 500 MILLIGRAM(S): at 12:19

## 2017-09-29 RX ADMIN — Medication 650 MILLIGRAM(S): at 06:30

## 2017-09-29 RX ADMIN — Medication 1 MILLIGRAM(S): at 12:19

## 2017-09-29 RX ADMIN — MEXILETINE HYDROCHLORIDE 150 MILLIGRAM(S): 150 CAPSULE ORAL at 17:36

## 2017-09-29 RX ADMIN — Medication 325 MILLIGRAM(S): at 12:19

## 2017-09-29 RX ADMIN — SODIUM CHLORIDE 3 MILLILITER(S): 9 INJECTION INTRAMUSCULAR; INTRAVENOUS; SUBCUTANEOUS at 14:49

## 2017-09-29 RX ADMIN — MEXILETINE HYDROCHLORIDE 150 MILLIGRAM(S): 150 CAPSULE ORAL at 09:00

## 2017-09-29 RX ADMIN — Medication 250 MILLIGRAM(S): at 17:58

## 2017-09-29 RX ADMIN — QUETIAPINE FUMARATE 50 MILLIGRAM(S): 200 TABLET, FILM COATED ORAL at 22:13

## 2017-09-29 RX ADMIN — MEXILETINE HYDROCHLORIDE 150 MILLIGRAM(S): 150 CAPSULE ORAL at 22:13

## 2017-09-29 RX ADMIN — PANTOPRAZOLE SODIUM 40 MILLIGRAM(S): 20 TABLET, DELAYED RELEASE ORAL at 17:35

## 2017-09-29 RX ADMIN — CARVEDILOL PHOSPHATE 6.25 MILLIGRAM(S): 80 CAPSULE, EXTENDED RELEASE ORAL at 06:25

## 2017-09-29 RX ADMIN — PANTOPRAZOLE SODIUM 40 MILLIGRAM(S): 20 TABLET, DELAYED RELEASE ORAL at 07:08

## 2017-09-29 RX ADMIN — Medication 20 MILLIGRAM(S): at 17:36

## 2017-09-29 NOTE — PROGRESS NOTE ADULT - ASSESSMENT
Impression:  1)Acute blood loss anemia (melena)- upper GI source, differential includes small bowel angioectasis, dieulafoy lesion  2)Severe systolic CHF s/p LVAD on anticoagulation    Plan:  1)Monitor Hgb daily and bowel movements   2)f/u results of video capsule endoscopy   3)c/w PPI PO daily   4)A/C per CHF/CT ICU team     Please call with questions  Marlin Borrego  GI Fellow  Pager: [phone]

## 2017-09-29 NOTE — PROGRESS NOTE ADULT - SUBJECTIVE AND OBJECTIVE BOX
VITAL SIGNS    Telemetry:  SR 80-90  Vital Signs Last 24 Hrs  T(C): 36 (09-29-17 @ 14:00), Max: 36.8 (09-29-17 @ 00:16)  T(F): 96.8 (09-29-17 @ 14:00), Max: 98.3 (09-29-17 @ 00:16)  HR: 78 (09-29-17 @ 12:00) (78 - 83)  BP: --  RR: 18 (09-29-17 @ 14:00) (18 - 20)  SpO2: 99% (09-29-17 @ 14:00) (99% - 100%)            09-28 @ 07:01  -  09-29 @ 07:00  --------------------------------------------------------  IN: 1200 mL / OUT: 1000 mL / NET: 200 mL    09-29 @ 07:01  -  09-29 @ 15:16  --------------------------------------------------------  IN: 640 mL / OUT: 225 mL / NET: 415 mL       Daily     Daily   Admit Wt: Drug Dosing Weight  Height (cm): 172.5 (25 Sep 2017 20:33)  Weight (kg): 70.3 (25 Sep 2017 20:33)  BMI (kg/m2): 23.6 (25 Sep 2017 20:33)  BSA (m2): 1.83 (25 Sep 2017 20:33)        MEDICATIONS  pantoprazole  Injectable 40 milliGRAM(s) IV Push every 12 hours  amiodarone    Tablet 200 milliGRAM(s) Oral daily  ferrous    sulfate 325 milliGRAM(s) Oral daily  folic acid 1 milliGRAM(s) Oral daily  mexiletine 150 milliGRAM(s) Oral every 8 hours  QUEtiapine 50 milliGRAM(s) Oral daily  ascorbic acid 500 milliGRAM(s) Oral daily  sodium chloride 0.9% lock flush 3 milliLiter(s) IV Push every 8 hours  acetaminophen   Tablet. 650 milliGRAM(s) Oral every 6 hours PRN  carvedilol 12.5 milliGRAM(s) Oral every 12 hours  spironolactone 25 milliGRAM(s) Oral daily  furosemide    Tablet 20 milliGRAM(s) Oral every other day  octreotide  Infusion 25 MICROgram(s)/Hr IV Continuous <Continuous>      PHYSICAL EXAM    Subjective: c/o  rt  thumb pain   Neurology: alert and oriented x 3, nonfocal, no gross deficits  CV :  = lvad humm   Sternal Wound :  CDI , Stable healed drive line site CDI   Lungs: CTA, equal chest expansion  Abdomen: soft, nontender, nondistended, positive bowel sounds, last bowel movement 9/28/17   :    voids  Extremities:    Rt thumb extendingto wrist warm to touch, + swelling,  trace  edema,  +dp b/l , no calt tenderness b/l , warm and perfused b/l    LABS  09-29    136  |  105  |  17  ----------------------------<  103<H>  4.2   |  20<L>  |  1.15    Ca    8.8      29 Sep 2017 06:26  Phos  4.3     09-28  Mg     2.0     09-28    TPro  6.6  /  Alb  3.1<L>  /  TBili  0.5  /  DBili  x   /  AST  53<H>  /  ALT  39  /  AlkPhos  95  09-28                                 10.6   11.5  )-----------( 275      ( 29 Sep 2017 06:26 )             32.4          PT/INR - ( 29 Sep 2017 06:26 )   PT: 16.7 sec;   INR: 1.52 ratio         PTT - ( 28 Sep 2017 04:40 )  PTT:33.9 sec         PAST MEDICAL & SURGICAL HISTORY:  GIB (gastrointestinal bleeding)  Ventricular fibrillation: s/p AICD  PAF (paroxysmal atrial fibrillation): on xarelto  Non-Ischemic Cardiomyopathy  SVT (Supraventricular Tachycardia)  HTN  CHF (Congestive Heart Failure)  LVAD (left ventricular assist device) present  Status post left hip replacement  History of Prior Ablation Treatment: for afib  AICD (Automatic Cardioverter/Defibrillator) Present: [employer] with 1 [employer] lead4/1/09- explanted and replaced with [employer] 2 leads on 9/2/09       Physical Therapy Rec:   Home  [  ]   Home w/ PT  [x]  Rehab  [  ]

## 2017-09-29 NOTE — PROGRESS NOTE ADULT - ASSESSMENT
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    # UGIB likely 2/2 AVMs: MAPS remain elevated at around 80s, and hgb responded appropriately to 2uprbc with stable cbc suggestive of cessation of bleeding. Normal Bms at this time. sp EGD push enteroscopy 9/27 with no source of bleeding found; undergoing pill capsule study  - cont trending hgb, monitor BM  - will monitor off coumadin or antiplatelet for now pending result of AVM study; likely to dispo off any anticoagulation but with possibly an antiplatlet therapy  - protonix 40mg ivp bid  - will arrange for speed study today to decrease speed to allow more pulsatility to hopefully decrease formation of AVMS. I have taken the liebrty to place hte order for theTTE    #NICM sp HM LVAD:  - Current settings:  5.1L/M speed 9200 PI 6.3 5.5W  - ramp study as above  - given hemodynams table at this time with appropriate response in hgb suggestive of resolved bleeding, cont coreg 6.25mg bid,   - would restart aldactone 25 today and lasix 20 eod today    # prior VT with [employer] ICD: cont mexilitine 150mg tid, amio 200 qd,  coreg as above    # depression: cont quetiapine 50 qhs.     Claudia Francis MD  CHF  [phone] (After 5pm and on weekends, please call [phone])      outpt setting:  Device Type: HM2  Flow: 5.0 L/min  Speed: 9200 RPM  Power: 5.4 [place]  PI: 7.0  MAP: 94 Patient pulsetile /86

## 2017-09-29 NOTE — PROGRESS NOTE ADULT - SUBJECTIVE AND OBJECTIVE BOX
24H hour events:   normal bm last night  undergoing pill endoscopy  still with right wrist swelling/pain/tenderness from where an IV was     to 254    MEDICATIONS:  amiodarone    Tablet 200 milliGRAM(s) Oral daily  carvedilol 6.25 milliGRAM(s) Oral every 12 hours  mexiletine 150 milliGRAM(s) Oral every 8 hours        QUEtiapine 50 milliGRAM(s) Oral daily  acetaminophen   Tablet. 650 milliGRAM(s) Oral every 6 hours PRN    pantoprazole  Injectable 40 milliGRAM(s) IV Push every 12 hours      ferrous    sulfate 325 milliGRAM(s) Oral daily  folic acid 1 milliGRAM(s) Oral daily  ascorbic acid 500 milliGRAM(s) Oral daily      REVIEW OF SYSTEMS:  General: no fatigue/malaise, weight loss/gain.  Skin: no rashes.  Ophthalmologic: no blurred vision, no loss of vision. 	  ENT: no sore throat, rhinorrhea, sinus congestion.  Respiratory: no SOB, cough or wheeze.  Gastrointestinal:  no N/V/D, no melena/hematemesis/hematochezia.  Genitourinary: no dysuria/hesitancy or hematuria.  Musculoskeletal: no myalgias or arthralgias.  Neurological: no changes in vision or hearing, no lightheadedness/dizziness, no syncope/near syncope	  Psychiatric: no unusual stress/anxiety.   Hematology/Lymphatics: no unusual bleeding, bruising and no lymphadenopathy.  Endocrine: no unusual thirst.   All others negative except as stated above and in HPI.    PHYSICAL EXAM:  T(C): 36.6 (09-29-17 @ 05:30), Max: 36.8 (09-29-17 @ 00:16)  HR: 83 (09-29-17 @ 00:16) (64 - 83)  BP: --  RR: 20 (09-29-17 @ 05:30) (18 - 20)  SpO2: 99% (09-29-17 @ 05:30) (97% - 100%)  Wt(kg): --  I&O's Summary    28 Sep 2017 07:01  -  29 Sep 2017 07:00  --------------------------------------------------------  IN: 1200 mL / OUT: 1000 mL / NET: 200 mL        Appearance: Normal	  HEENT:   Normal oral mucosa, PERRL, EOMI	  Lymphatic: No lymphadenopathy  Cardiovascular: Normal S1 S2, No JVD, No murmurs, No edema  Respiratory: Lungs clear to auscultation	  Psychiatry: A & O x 3, Mood & affect appropriate  Gastrointestinal:  Soft, Non-tender, + BS	  Skin: No rashes, No ecchymoses, No cyanosis	  Neurologic: Non-focal  Extremities: Normal range of motion, No clubbing, cyanosis or edema  Vascular: Peripheral pulses palpable 2+ bilaterally        LABS:	 	    CBC Full  -  ( 29 Sep 2017 06:26 )  WBC Count : 11.5 K/uL  Hemoglobin : 10.6 g/dL  Hematocrit : 32.4 %  Platelet Count - Automated : 275 K/uL  Mean Cell Volume : 89.7 fl  Mean Cell Hemoglobin : 29.3 pg  Mean Cell Hemoglobin Concentration : 32.7 gm/dL  Auto Neutrophil # : x  Auto Lymphocyte # : x  Auto Monocyte # : x  Auto Eosinophil # : x  Auto Basophil # : x  Auto Neutrophil % : x  Auto Lymphocyte % : x  Auto Monocyte % : x  Auto Eosinophil % : x  Auto Basophil % : x    09-29    136  |  105  |  17  ----------------------------<  103<H>  4.2   |  20<L>  |  1.15  09-28    139  |  105  |  19  ----------------------------<  88  4.4   |  20<L>  |  1.04    Ca    8.8      29 Sep 2017 06:26  Ca    8.6      28 Sep 2017 04:40  Phos  4.3     09-28  Mg     2.0     09-28    TPro  6.6  /  Alb  3.1<L>  /  TBili  0.5  /  DBili  x   /  AST  53<H>  /  ALT  39  /  AlkPhos  95  09-28  TPro  6.4  /  Alb  2.9<L>  /  TBili  0.5  /  DBili  x   /  AST  55<H>  /  ALT  36  /  AlkPhos  93  09-27 24H hour events:   normal bm last night, almost diarrhea, pill excreted yesterday evening  still with right wrist swelling/pain/tenderness from where an IV was     to 254    MEDICATIONS:  amiodarone    Tablet 200 milliGRAM(s) Oral daily  carvedilol 6.25 milliGRAM(s) Oral every 12 hours  mexiletine 150 milliGRAM(s) Oral every 8 hours        QUEtiapine 50 milliGRAM(s) Oral daily  acetaminophen   Tablet. 650 milliGRAM(s) Oral every 6 hours PRN    pantoprazole  Injectable 40 milliGRAM(s) IV Push every 12 hours      ferrous    sulfate 325 milliGRAM(s) Oral daily  folic acid 1 milliGRAM(s) Oral daily  ascorbic acid 500 milliGRAM(s) Oral daily      REVIEW OF SYSTEMS:  General: no fatigue/malaise, weight loss/gain.  Skin: no rashes.  Ophthalmologic: no blurred vision, no loss of vision. 	  ENT: no sore throat, rhinorrhea, sinus congestion.  Respiratory: no SOB, cough or wheeze.  Gastrointestinal:  no N/V/D, no melena/hematemesis/hematochezia.  Genitourinary: no dysuria/hesitancy or hematuria.  Musculoskeletal: no myalgias or arthralgias.  Neurological: no changes in vision or hearing, no lightheadedness/dizziness, no syncope/near syncope	  Psychiatric: no unusual stress/anxiety.   Hematology/Lymphatics: no unusual bleeding, bruising and no lymphadenopathy.  Endocrine: no unusual thirst.   All others negative except as stated above and in HPI.    PHYSICAL EXAM:  T(C): 36.6 (09-29-17 @ 05:30), Max: 36.8 (09-29-17 @ 00:16)  HR: 83 (09-29-17 @ 00:16) (64 - 83)  BP: --  RR: 20 (09-29-17 @ 05:30) (18 - 20)  SpO2: 99% (09-29-17 @ 05:30) (97% - 100%)  Wt(kg): --  I&O's Summary    28 Sep 2017 07:01  -  29 Sep 2017 07:00  --------------------------------------------------------  IN: 1200 mL / OUT: 1000 mL / NET: 200 mL        Appearance: Normal	  HEENT:   Normal oral mucosa, PERRL, EOMI	  Lymphatic: No lymphadenopathy  Cardiovascular: Normal S1 S2, No JVD, No murmurs, No edema  Respiratory: Lungs clear to auscultation	  Psychiatry: A & O x 3, Mood & affect appropriate  Gastrointestinal:  Soft, Non-tender, + BS	  Skin: No rashes, No ecchymoses, No cyanosis	  Neurologic: Non-focal  Extremities: Normal range of motion, No clubbing, cyanosis or edema  Vascular: Peripheral pulses palpable 2+ bilaterally        LABS:	 	    CBC Full  -  ( 29 Sep 2017 06:26 )  WBC Count : 11.5 K/uL  Hemoglobin : 10.6 g/dL  Hematocrit : 32.4 %  Platelet Count - Automated : 275 K/uL  Mean Cell Volume : 89.7 fl  Mean Cell Hemoglobin : 29.3 pg  Mean Cell Hemoglobin Concentration : 32.7 gm/dL  Auto Neutrophil # : x  Auto Lymphocyte # : x  Auto Monocyte # : x  Auto Eosinophil # : x  Auto Basophil # : x  Auto Neutrophil % : x  Auto Lymphocyte % : x  Auto Monocyte % : x  Auto Eosinophil % : x  Auto Basophil % : x    09-29    136  |  105  |  17  ----------------------------<  103<H>  4.2   |  20<L>  |  1.15  09-28    139  |  105  |  19  ----------------------------<  88  4.4   |  20<L>  |  1.04    Ca    8.8      29 Sep 2017 06:26  Ca    8.6      28 Sep 2017 04:40  Phos  4.3     09-28  Mg     2.0     09-28    TPro  6.6  /  Alb  3.1<L>  /  TBili  0.5  /  DBili  x   /  AST  53<H>  /  ALT  39  /  AlkPhos  95  09-28  TPro  6.4  /  Alb  2.9<L>  /  TBili  0.5  /  DBili  x   /  AST  55<H>  /  ALT  36  /  AlkPhos  93  09-27

## 2017-09-29 NOTE — CHART NOTE - NSCHARTNOTEFT_GEN_A_CORE
ADVANCED ENDOSCOPY NOTE:    Capsule endoscopy reviewed, prelim read shows bright red blood in proximal small bowel (4 minutes into small bowel)- no overt GI bleeding currently with stable Hgb. will recommend Sandostatin rx, continue to monitor. If rebleeding, will plan for repeat push enteroscopy/single balloon.     Please call with questions  Marlin Borrego  GI Fellow  Pager: [phone]

## 2017-09-29 NOTE — PROGRESS NOTE ADULT - ASSESSMENT
67M Hx LVAD 6/12/17 readmitted 9/25 with GIB... HCT 18.  Admitted to CTU transfused.  Hx of AV malformation.  His HCT stabilized.  Transferred to [address] [place] [address] 9/28. s/p Capsule study  revealing  GIB in small bowel.    9/28 C/O right wrist/thumb pain.  Xray showed arthritis ?subluxation of thumb pt with hx of gout started on colchicine  9/29 coreg increased to 12.5mg po bid, diurectics resumed ( aldactone 25mg daily and [id number] every other day_ + capsule study started on  sandostatin  ( octreotide gtt)  AC  on hold

## 2017-09-29 NOTE — PROGRESS NOTE ADULT - SUBJECTIVE AND OBJECTIVE BOX
ADVANCED ENDOSCOPY PROGRESS NOTE:     Chief Complaint:  Patient is a 67y old  Male who presents with a chief complaint of melena, Anemia. (25 Sep 2017 22:44)    Interval Events:  - s/p Push enteroscopy that was negative for any source of bleeding  - one non-bloody BM overnight  - no nausea, no vomiting  - c/o right hand swelling and pain with movement        Allergies:  No Known Allergies      Home Medications: reviewed     Hospital Medications:  pantoprazole  Injectable 40 milliGRAM(s) IV Push every 12 hours  amiodarone    Tablet 200 milliGRAM(s) Oral daily  carvedilol 6.25 milliGRAM(s) Oral every 12 hours  ferrous    sulfate 325 milliGRAM(s) Oral daily  folic acid 1 milliGRAM(s) Oral daily  furosemide    Tablet 20 milliGRAM(s) Oral every other day  mexiletine 150 milliGRAM(s) Oral every 8 hours  QUEtiapine 50 milliGRAM(s) Oral daily  acetaminophen   Tablet 650 milliGRAM(s) Oral every 8 hours PRN  ascorbic acid 500 milliGRAM(s) Oral daily  sodium chloride 0.9%. 1000 milliLiter(s) IV Continuous <Continuous>      PMHX/PSHX:  GIB (gastrointestinal bleeding)  H/O prior ablation treatment  Ventricular fibrillation  PAF (paroxysmal atrial fibrillation)  Non-Ischemic Cardiomyopathy  SVT (Supraventricular Tachycardia)  HTN  CHF (Congestive Heart Failure)  LVAD (left ventricular assist device) present  Status post left hip replacement  Hypertension  Hypertension  History of Prior Ablation Treatment  AICD (Automatic Cardioverter/Defibrillator) Present      Family history:  No pertinent family history in first degree relatives      Social History: no current tobacco, ETOH, or IVDA     ROS:     General:  No wt loss, fevers, chills, night sweats, fatigue,   Eyes:  Good vision, no reported pain  ENT:  No sore throat, pain, runny nose, dysphagia  CV:  No pain, palpitations, hypo/hypertension  Resp:  No dyspnea, cough, tachypnea, wheezing  GI:  No pain, No nausea, No vomiting, No diarrhea, No constipation, No weight loss, No fever, No pruritis, No rectal bleeding, + tarry stools, No dysphagia,  :  No pain, bleeding, incontinence, nocturia  Muscle:  +right hand pain   Neuro:  No weakness, tingling, memory problems  Psych:  No fatigue, insomnia, mood problems, depression  Endocrine:  No polyuria, polydipsia, cold/heat intolerance  Heme:  No petechiae, ecchymosis, easy bruisability  Skin:  No rash, tattoos, scars, edema      PHYSICAL EXAM:     GENERAL:  Appears stated age, well-groomed, well-nourished, no distress  HEENT:  NC/AT,  conjunctivae clear and pink, no thyromegaly, nodules, adenopathy, no JVD, sclera -anicteric  CHEST:  Full & symmetric excursion, no increased effort, breath sounds clear  HEART:  +LVAD   ABDOMEN:  Soft, non-tender, non-distended, normoactive bowel sounds,  no masses ,no hepato-splenomegaly, no signs of chronic liver disease  EXTEREMITIES:  no cyanosis,clubbing or edema; right hand with tenderness to palpation   SKIN:  No rash/erythema/ecchymoses/petechiae/wounds/abscess/warm/dry  NEURO:  Alert, oriented x 3, no asterixis, no tremor, no encephalopathy        Vital Signs:  Vital Signs Last 24 Hrs  T(C): 36.6 (29 Sep 2017 05:30), Max: 36.8 (29 Sep 2017 00:16)  T(F): 97.9 (29 Sep 2017 05:30), Max: 98.3 (29 Sep 2017 00:16)  HR: 83 (29 Sep 2017 00:16) (64 - 83)  BP: --  BP(mean): 86 (29 Sep 2017 05:30) (82 - 98)  RR: 20 (29 Sep 2017 05:30) (18 - 28)  SpO2: 99% (29 Sep 2017 05:30) (94% - 100%)  Daily     Daily     LABS:                        10.6   11.5  )-----------( 275      ( 29 Sep 2017 06:26 )             32.4   Hemoglobin: 10.6 g/dL (09-29-17 @ 06:26)  Hemoglobin: 9.9 g/dL (09-28-17 @ 04:40)  Hemoglobin: 9.2 g/dL (09-27-17 @ 23:39)  Hemoglobin: 10.0 g/dL (09-27-17 @ 02:06)  Hemoglobin: 10.0 g/dL (09-26-17 @ 17:55)    09-29    136  |  105  |  17  ----------------------------<  103<H>  4.2   |  20<L>  |  1.15    Ca    8.8      29 Sep 2017 06:26  Phos  4.3     09-28  Mg     2.0     09-28    TPro  6.6  /  Alb  3.1<L>  /  TBili  0.5  /  DBili  x   /  AST  53<H>  /  ALT  39  /  AlkPhos  95  09-28    LIVER FUNCTIONS - ( 28 Sep 2017 04:40 )  Alb: 3.1 g/dL / Pro: 6.6 g/dL / ALK PHOS: 95 U/L / ALT: 39 U/L RC / AST: 53 U/L / GGT: x           PT/INR - ( 29 Sep 2017 06:26 )   PT: 16.7 sec;   INR: 1.52 ratio         PTT - ( 28 Sep 2017 04:40 )  PTT:33.9 sec    Amylase Serum75      Lipase serum--       Ammonia--      Imaging:    < from: Enteroscopy (08.23.17 @ 12:06) >  [facility]  ____________________________________________________________________________________________________  Patient Name: Yoseph Baez                       MRN: [id number]  Account Number: [id number]                     YOB: 1950  Room: Endoscopy Room 2                           Gender: Male  Attending MD: Quentin James MD                    Procedure Date No Time: 8/23/2017  ____________________________________________________________________________________________________     Procedure:           Upper endoscopy/Push Enteroscopy  Indications:         Gastrointestinal bleeding (melena), Blood loss anemia, Small bowel capsule                        showing angioectasias in proximal small bowel, LVAD in place.  Providers:           Quentin James MD, Christos Pickett MD (Fellow)  Referring MD:        Ayaz Barr  Medicines:           Monitored Anesthesia Care  Complications:       No immediate complications.  ____________________________________________________________________________________________________  Procedure:           Pre-Anesthesia Assessment:                       - Prior to the procedure, a History and Physical was performed, and patient                        medications, allergies and sensitivities were reviewed. The patient's                        tolerance of previous anesthesia was reviewed.                       - The risks and benefits of the procedure and the sedation options and risks                        were discussed with the patient. All questions were answered and informed                        consent was obtained.                       - Patient identification and proposed procedure were verified prior to the                        procedure by the physician. The procedure was verified in the endoscopy suite.                       After obtaining informed consent, the endoscope was passed under direct                        vision. Throughout the procedure, the patient's blood pressure, pulse, and                        oxygen saturations were monitored continuously. The pediatric colonoscope was                        introduced through the mouth and advanced to the proximal/mid jejunum. The                        small bowel enteroscopy wasaccomplished without difficulty. The patient                        tolerated the procedure well.                                                                                                        Findings:       The examined esophagus was normal.       A few, non-bleeding erosions were found in the stomach.       Three nonbleeding angioectasias measuring up to 3 mm were found in the proximal-mid jejunum.        Coagulation for hemostasis using argon plasma at 0.5 liters/minute and 20 raymond was        successful.                                                                                                        Impression:          - Non-bleeding erosive gastropathy.                       - Three non-bleeding angiodysplastic lesions inthe jejunum. Treated with                        argon plasma coagulation (APC).  Recommendation:      - Return patient to [facility] for ongoing care.                       - Clear liquid diet, advance tomorrow if feeling well.     - Monitor H/H, transfuse accordingly    < end of copied text >      < from: Enteroscopy (07.25.17 @ 12:08) >  [facility]  ____________________________________________________________________________________________________  Patient Name: Yoseph Baez                       MRN: [id number]  Account Number: [id number]                     YOB: 1950  Room: Endoscopy Room 1                           Gender: Male  Attending MD: DOMINIK PASCUAL MD                 Procedure Date No Time: 7/25/2017  ____________________________________________________________________________________________________     Procedure:           Small bowel enteroscopy  Indications:         Melena  Providers:           DOMINIK PASCUAL MD, Christos Pickett MD (Fellow)  Referring MD:        Ayaz Barr  Medicines:           Monitored Anesthesia Care  Complications:       No immediate complications.  ____________________________________________________________________________________________________  Procedure:           Pre-Anesthesia Assessment:                       - Prior to the procedure, a History and Physical was performed, and patient                        medications, allergies and sensitivities were reviewed. The patient's                        tolerance of previous anesthesia was reviewed.                       - The risks and benefits of the procedure and the sedation options and risks                        were discussed with the patient. All questions were answered and informed                        consent was obtained.                       - Patient identificationand proposed procedure were verified prior to the   Surgical Pathology Report (04.13.17 @ 13:00)    Surgical Pathology Report:   ACCESSION No:  [id number]    YOSEPH BAEZ                         2        Surgical Final Report          Final Diagnosis    1. Stomach, antrum, biopsy:  - Gastric antral mucosa with intestinal metaplasia and  chronic gastritis.  - Negative for dysplasia.  - Negative for Helicobacter pylori (special stain).    2. Gastric ulcer, biopsy:  - Gastric antral-oxyntic mucosa with intestinal metaplasia  in background of  ulceration and chronic active gastritis.  - Negative for dysplasia (additional levels x3 examined).  - Negative for Helicobacter pylori (special stain and  immunohistochemistry).    Slide(s) with built in immunohistochemical study control(s)  associated and negative control with this case has/have been  verified by the sign out pathologist.  These immunohistochemical/ in-situ hybridization tests have been  developed and their performance characteristics determined by  [facility], Department of  Pathology, Division of Immunopathology, [address] [place] [address].  It has not been cleared or approved by the  U.[place]. Food and Drug Administration.  The FDA has determined that  such clearance or approval is not necessary.  This test is used  for clinical purposes.  The laboratory iscertified under the  CLIA-88 as qualified to perform high  complexity clinical testing.    Ariel Johnson M.D.  (Electronic Signature)  Reported on: 04/18/17    Clinical History  66 years old with CAF, gastric ulcer.  EGD    Specimen(s) Submitted  1-Antrum biopsy  2- Gastric ulcer    Gross Description  04/13/17 17:55  1.   The specimen is received in formalin and the specimen  container is labeled: Antrum.  It consists of one fragments of  tan-pink, soft tissue, measuring 0.3 cm in greatest dimension.  Entirely submitted.  One cassette.            YOSEPH BAEZ                         2        Surgical Final Report            2.   The specimen is received in formalin and the specimen  container is labeled: Gastric ulcer.  It consists of three  fragments of tan-pink, soft tissue, ranging from 0.1-0.3  cm in  greatest dimension.  Entirely submitted.  One cassette.    In addition to other data that may appear on the specimen  containers, all labels have been inspected to confirm the  presenceof the patient's name and date of birth.    XW  Thu  April 13, 2017 05:56 PM                         procedure by the physician. The procedure was verified in the endoscopy suite.                       After obtaining informed consent, the endoscope was passed under direct                     vision. Throughout the procedure, the patient's blood pressure, pulse, and                        oxygen saturations were monitored continuously. The Colonoscope was                        introduced through the mouth and advanced to the proximal jejunum. The small                        bowel enteroscopy was accomplished without difficulty. The patient tolerated                        the procedure well.                              Findings:       The examined esophagus was normal.       The entire examined stomach was normal.       There was no evidence of significant pathology in the entire examined duodenum.       Two angioectasias with no bleeding were found in the proximal jejunum. Coagulation for        hemostasis using bipolar probe was successful.                                                                                                        Impression:          - Normal esophagus,stomach and duodenum.                       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 Treated with bipolar cautery.                       - No specimens collected.  Recommendation:      - [facility] patient to [facility] for [facility] care.                  - Plan for small bowel capsule study today.

## 2017-09-30 LAB
ALBUMIN SERPL ELPH-MCNC: 3.2 G/DL — LOW (ref 3.3–5)
ALP SERPL-CCNC: 105 U/L — SIGNIFICANT CHANGE UP (ref 40–120)
ALT FLD-CCNC: 50 U/L RC — HIGH (ref 10–45)
ANION GAP SERPL CALC-SCNC: 12 MMOL/L — SIGNIFICANT CHANGE UP (ref 5–17)
AST SERPL-CCNC: 46 U/L — HIGH (ref 10–40)
BILIRUB SERPL-MCNC: 0.4 MG/DL — SIGNIFICANT CHANGE UP (ref 0.2–1.2)
BUN SERPL-MCNC: 16 MG/DL — SIGNIFICANT CHANGE UP (ref 7–23)
CALCIUM SERPL-MCNC: 8.8 MG/DL — SIGNIFICANT CHANGE UP (ref 8.4–10.5)
CHLORIDE SERPL-SCNC: 104 MMOL/L — SIGNIFICANT CHANGE UP (ref 96–108)
CO2 SERPL-SCNC: 19 MMOL/L — LOW (ref 22–31)
CREAT SERPL-MCNC: 1.05 MG/DL — SIGNIFICANT CHANGE UP (ref 0.5–1.3)
GLUCOSE SERPL-MCNC: 118 MG/DL — HIGH (ref 70–99)
HCT VFR BLD CALC: 33 % — LOW (ref 39–50)
HGB BLD-MCNC: 10.9 G/DL — LOW (ref 13–17)
INR BLD: 1.49 RATIO — HIGH (ref 0.88–1.16)
LDH SERPL L TO P-CCNC: 241 U/L — SIGNIFICANT CHANGE UP (ref 50–242)
MCHC RBC-ENTMCNC: 29.7 PG — SIGNIFICANT CHANGE UP (ref 27–34)
MCHC RBC-ENTMCNC: 33.1 GM/DL — SIGNIFICANT CHANGE UP (ref 32–36)
MCV RBC AUTO: 89.7 FL — SIGNIFICANT CHANGE UP (ref 80–100)
PLATELET # BLD AUTO: 264 K/UL — SIGNIFICANT CHANGE UP (ref 150–400)
POTASSIUM SERPL-MCNC: 4.7 MMOL/L — SIGNIFICANT CHANGE UP (ref 3.5–5.3)
POTASSIUM SERPL-SCNC: 4.7 MMOL/L — SIGNIFICANT CHANGE UP (ref 3.5–5.3)
PROT SERPL-MCNC: 7.3 G/DL — SIGNIFICANT CHANGE UP (ref 6–8.3)
PROTHROM AB SERPL-ACNC: 16.2 SEC — HIGH (ref 9.8–12.7)
RBC # BLD: 3.68 M/UL — LOW (ref 4.2–5.8)
RBC # FLD: 17.7 % — HIGH (ref 10.3–14.5)
SODIUM SERPL-SCNC: 135 MMOL/L — SIGNIFICANT CHANGE UP (ref 135–145)
WBC # BLD: 10.9 K/UL — HIGH (ref 3.8–10.5)
WBC # FLD AUTO: 10.9 K/UL — HIGH (ref 3.8–10.5)

## 2017-09-30 PROCEDURE — 99233 SBSQ HOSP IP/OBS HIGH 50: CPT | Mod: GC

## 2017-09-30 RX ORDER — HYDRALAZINE HCL 50 MG
25 TABLET ORAL EVERY 8 HOURS
Qty: 0 | Refills: 0 | Status: DISCONTINUED | OUTPATIENT
Start: 2017-09-30 | End: 2017-10-04

## 2017-09-30 RX ADMIN — MEXILETINE HYDROCHLORIDE 150 MILLIGRAM(S): 150 CAPSULE ORAL at 08:03

## 2017-09-30 RX ADMIN — SODIUM CHLORIDE 3 MILLILITER(S): 9 INJECTION INTRAMUSCULAR; INTRAVENOUS; SUBCUTANEOUS at 12:25

## 2017-09-30 RX ADMIN — PANTOPRAZOLE SODIUM 40 MILLIGRAM(S): 20 TABLET, DELAYED RELEASE ORAL at 17:48

## 2017-09-30 RX ADMIN — MEXILETINE HYDROCHLORIDE 150 MILLIGRAM(S): 150 CAPSULE ORAL at 14:13

## 2017-09-30 RX ADMIN — CARVEDILOL PHOSPHATE 12.5 MILLIGRAM(S): 80 CAPSULE, EXTENDED RELEASE ORAL at 17:48

## 2017-09-30 RX ADMIN — SODIUM CHLORIDE 3 MILLILITER(S): 9 INJECTION INTRAMUSCULAR; INTRAVENOUS; SUBCUTANEOUS at 06:22

## 2017-09-30 RX ADMIN — Medication 1 MILLIGRAM(S): at 11:51

## 2017-09-30 RX ADMIN — QUETIAPINE FUMARATE 50 MILLIGRAM(S): 200 TABLET, FILM COATED ORAL at 21:29

## 2017-09-30 RX ADMIN — MEXILETINE HYDROCHLORIDE 150 MILLIGRAM(S): 150 CAPSULE ORAL at 21:29

## 2017-09-30 RX ADMIN — SPIRONOLACTONE 25 MILLIGRAM(S): 25 TABLET, FILM COATED ORAL at 06:26

## 2017-09-30 RX ADMIN — AMIODARONE HYDROCHLORIDE 200 MILLIGRAM(S): 400 TABLET ORAL at 06:26

## 2017-09-30 RX ADMIN — Medication 325 MILLIGRAM(S): at 11:51

## 2017-09-30 RX ADMIN — Medication 500 MILLIGRAM(S): at 11:51

## 2017-09-30 RX ADMIN — Medication 25 MILLIGRAM(S): at 21:29

## 2017-09-30 RX ADMIN — SODIUM CHLORIDE 3 MILLILITER(S): 9 INJECTION INTRAMUSCULAR; INTRAVENOUS; SUBCUTANEOUS at 21:20

## 2017-09-30 RX ADMIN — CARVEDILOL PHOSPHATE 12.5 MILLIGRAM(S): 80 CAPSULE, EXTENDED RELEASE ORAL at 06:26

## 2017-09-30 RX ADMIN — PANTOPRAZOLE SODIUM 40 MILLIGRAM(S): 20 TABLET, DELAYED RELEASE ORAL at 06:26

## 2017-09-30 NOTE — PROGRESS NOTE ADULT - SUBJECTIVE AND OBJECTIVE BOX
24H hour events:  No acute events in last 24 hours. Patient seen in bed, states feels well, improved since admission.     MEDICATIONS:  amiodarone    Tablet 200 milliGRAM(s) Oral daily  mexiletine 150 milliGRAM(s) Oral every 8 hours  carvedilol 12.5 milliGRAM(s) Oral every 12 hours  spironolactone 25 milliGRAM(s) Oral daily  furosemide    Tablet 20 milliGRAM(s) Oral every other day        QUEtiapine 50 milliGRAM(s) Oral daily  acetaminophen   Tablet. 650 milliGRAM(s) Oral every 6 hours PRN    pantoprazole  Injectable 40 milliGRAM(s) IV Push every 12 hours    octreotide  Infusion 25 MICROgram(s)/Hr IV Continuous <Continuous>    ferrous    sulfate 325 milliGRAM(s) Oral daily  folic acid 1 milliGRAM(s) Oral daily  ascorbic acid 500 milliGRAM(s) Oral daily        PHYSICAL EXAM:  T(C): 36.7 (09-30-17 @ 10:01), Max: 36.8 (09-29-17 @ 20:13)  HR: 80 (09-30-17 @ 10:01) (77 - 94)  BP: --  RR: 18 (09-30-17 @ 10:01) (17 - 18)  SpO2: 98% (09-30-17 @ 10:01) (96% - 100%)  Wt(kg): --  I&O's Summary    29 Sep 2017 07:01  -  30 Sep 2017 07:00  --------------------------------------------------------  IN: 2435 mL / OUT: 725 mL / NET: 1710 mL    30 Sep 2017 07:01  -  30 Sep 2017 11:59  --------------------------------------------------------  IN: 480 mL / OUT: 325 mL / NET: 155 mL        Appearance: Normal	  HEENT:   Normal oral mucosa  Lymphatic: No lymphadenopathy  Cardiovascular: Normal S1 S2,         No JVD,      No murmurs,      No edema  Respiratory: Lungs clear to auscultation	  Psychiatry: Mood & affect appropriate  Gastrointestinal:  Soft, Non-tender	  Skin: No rashes, No ecchymoses, No cyanosis	  Neurologic: Non-focal  Extremities: Normal range of motion, No clubbing, cyanosis   Vascular: warm extremities        LABS:	 	    CBC Full  -  ( 30 Sep 2017 05:06 )  WBC Count : 10.9 K/uL  Hemoglobin : 10.9 g/dL  Hematocrit : 33.0 %  Platelet Count - Automated : 264 K/uL  Mean Cell Volume : 89.7 fl  Mean Cell Hemoglobin : 29.7 pg  Mean Cell Hemoglobin Concentration : 33.1 gm/dL  Auto Neutrophil # : x  Auto Lymphocyte # : x  Auto Monocyte # : x  Auto Eosinophil # : x  Auto Basophil # : x  Auto Neutrophil % : x  Auto Lymphocyte % : x  Auto Monocyte % : x  Auto Eosinophil % : x  Auto Basophil % : x    09-30    135  |  104  |  16  ----------------------------<  118<H>  4.7   |  19<L>  |  1.05  09-29    136  |  105  |  17  ----------------------------<  103<H>  4.2   |  20<L>  |  1.15    Ca    8.8      30 Sep 2017 05:06  Ca    8.8      29 Sep 2017 06:26    TPro  7.3  /  Alb  3.2<L>  /  TBili  0.4  /  DBili  x   /  AST  46<H>  /  ALT  50<H>  /  AlkPhos  105  09-30      proBNP:     TELEMETRY: 	  S 70-80,   PVCs, 8 beats NSVT

## 2017-09-30 NOTE — PROGRESS NOTE ADULT - SUBJECTIVE AND OBJECTIVE BOX
VITAL SIGNS    Telemetry:  SR 60-90  Vital Signs Last 24 Hrs  T(C): 36.6 (17 @ 14:00), Max: 36.8 (17 @ 20:13)  T(F): 97.9 (17 @ 14:00), Max: 98.3 (17 @ 20:13)  HR: 80 (17 @ 14:00) (77 - 94)  BP: --  RR: 18 (17 @ 14:00) (17 - 18)  SpO2: 99% (17 @ 14:00) (96% - 100%)             @ 07:01  -   @ 07:00  --------------------------------------------------------  IN: 2435 mL / OUT: 725 mL / NET: 1710 mL     @ 07:01  -   @ 15:46  --------------------------------------------------------  IN: 760 mL / OUT: 325 mL / NET: 435 mL    Daily Weight in k.8 (30 Sep 2017 11:06)  Admit Wt(kg): 70.3 (25 Sep 2017 20:33)    PHYSICAL EXAM    Subjective: No complaints offered  Neurology: alert and oriented x 3, nonfocal, no gross deficits  CV : LVAD sounds  Lungs: CTA b/l  Abdomen: soft, NT,ND, (+ )BM  :  voiding  Extremities: -c/c/e        LABS      135  |  104  |  16  ----------------------------<  118<H>  4.7   |  19<L>  |  1.05    Ca    8.8      30 Sep 2017 05:06    TPro  7.3  /  Alb  3.2<L>  /  TBili  0.4  /  DBili  x   /  AST  46<H>  /  ALT  50<H>  /  AlkPhos  105  09-30                                 10.9   10.9  )-----------( 264      ( 30 Sep 2017 05:06 )             33.0          PT/INR - ( 30 Sep 2017 05:06 )   PT: 16.2 sec;   INR: 1.49 ratio

## 2017-09-30 NOTE — PROVIDER CONTACT NOTE (OTHER) - ACTION/TREATMENT ORDERED:
No further orders at this time. Continue to monitor the pt.
Providers notified. As per Dylan BRAVO, draw Arterial Blood Gas now and notify with results.
2 units PRBC's given as per MD Manuel Frye and MD Martinez orders, no other intervention ordered will continue to monitor.

## 2017-09-30 NOTE — PROGRESS NOTE ADULT - ASSESSMENT
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    # UGIB likely 2/2 AVMs:   - MAP ~80  - Hg improved  - GI following, capsule enteroscopy.   - holding coumadin, ASA  - protonix 40mg ivp bid, ocrtreotide gtt  - ramp study    #NICM sp HM LVAD:  - Current settings:  5.1L/M speed 9200 PI 6.3 5.5W  - ramp study as above  -continue coreg 12.5 BID, lasix 20 po QOD,  - cont renee 25 QD  # prior VT with [employer] ICD: cont mexilitine 150mg tid, amio 200 qd,  coreg as above    # VT  - mexilitine 150 po TID  # depression: cont quetiapine 50 qhs.                                 outpt setting:  Device Type: HM2  Flow: 5.0 L/min  Speed: 9200 RPM  Power: 5.4 [place]  PI: 7.0  MAP: 94 Patient pulsetile /86

## 2017-09-30 NOTE — PROGRESS NOTE ADULT - ASSESSMENT
67M Hx LVAD 6/12/17 readmitted 9/25 with GIB... HCT 18.  Admitted to CTU transfused.  Hx of AV malformation.  His HCT stabilized.  Transferred to [address] [place] [address] 9/28. s/p Capsule study  revealing  GIB in small bowel.    9/28 C/O right wrist/thumb pain.  Xray showed arthritis ?subluxation of thumb pt with hx of gout started on colchicine  9/29 coreg increased to 12.5mg po bid, diurectics resumed ( aldactone 25mg daily and [id number] every other day_   + capsule study started on  sandostatin  (octreotide gtt)  AC  on hold

## 2017-10-01 LAB
ALBUMIN SERPL ELPH-MCNC: 3.1 G/DL — LOW (ref 3.3–5)
ALP SERPL-CCNC: 93 U/L — SIGNIFICANT CHANGE UP (ref 40–120)
ALT FLD-CCNC: 38 U/L RC — SIGNIFICANT CHANGE UP (ref 10–45)
ANION GAP SERPL CALC-SCNC: 13 MMOL/L — SIGNIFICANT CHANGE UP (ref 5–17)
APTT BLD: 28.6 SEC — SIGNIFICANT CHANGE UP (ref 27.5–37.4)
AST SERPL-CCNC: 30 U/L — SIGNIFICANT CHANGE UP (ref 10–40)
BILIRUB SERPL-MCNC: 0.4 MG/DL — SIGNIFICANT CHANGE UP (ref 0.2–1.2)
BUN SERPL-MCNC: 17 MG/DL — SIGNIFICANT CHANGE UP (ref 7–23)
CALCIUM SERPL-MCNC: 9 MG/DL — SIGNIFICANT CHANGE UP (ref 8.4–10.5)
CHLORIDE SERPL-SCNC: 101 MMOL/L — SIGNIFICANT CHANGE UP (ref 96–108)
CO2 SERPL-SCNC: 21 MMOL/L — LOW (ref 22–31)
CREAT SERPL-MCNC: 1.09 MG/DL — SIGNIFICANT CHANGE UP (ref 0.5–1.3)
GLUCOSE SERPL-MCNC: 113 MG/DL — HIGH (ref 70–99)
HCT VFR BLD CALC: 32.6 % — LOW (ref 39–50)
HGB BLD-MCNC: 10.6 G/DL — LOW (ref 13–17)
INR BLD: 1.42 RATIO — HIGH (ref 0.88–1.16)
LDH SERPL L TO P-CCNC: 231 U/L — SIGNIFICANT CHANGE UP (ref 50–242)
MCHC RBC-ENTMCNC: 29.2 PG — SIGNIFICANT CHANGE UP (ref 27–34)
MCHC RBC-ENTMCNC: 32.4 GM/DL — SIGNIFICANT CHANGE UP (ref 32–36)
MCV RBC AUTO: 90.1 FL — SIGNIFICANT CHANGE UP (ref 80–100)
PLATELET # BLD AUTO: 277 K/UL — SIGNIFICANT CHANGE UP (ref 150–400)
POTASSIUM SERPL-MCNC: 4.5 MMOL/L — SIGNIFICANT CHANGE UP (ref 3.5–5.3)
POTASSIUM SERPL-SCNC: 4.5 MMOL/L — SIGNIFICANT CHANGE UP (ref 3.5–5.3)
PROT SERPL-MCNC: 7.1 G/DL — SIGNIFICANT CHANGE UP (ref 6–8.3)
PROTHROM AB SERPL-ACNC: 15.6 SEC — HIGH (ref 9.8–12.7)
RBC # BLD: 3.62 M/UL — LOW (ref 4.2–5.8)
RBC # FLD: 17.4 % — HIGH (ref 10.3–14.5)
SODIUM SERPL-SCNC: 135 MMOL/L — SIGNIFICANT CHANGE UP (ref 135–145)
WBC # BLD: 11.2 K/UL — HIGH (ref 3.8–10.5)
WBC # FLD AUTO: 11.2 K/UL — HIGH (ref 3.8–10.5)

## 2017-10-01 PROCEDURE — 99233 SBSQ HOSP IP/OBS HIGH 50: CPT | Mod: GC

## 2017-10-01 RX ORDER — HYDROCORTISONE 1 %
1 OINTMENT (GRAM) TOPICAL
Qty: 0 | Refills: 0 | Status: DISCONTINUED | OUTPATIENT
Start: 2017-10-01 | End: 2017-10-04

## 2017-10-01 RX ADMIN — MEXILETINE HYDROCHLORIDE 150 MILLIGRAM(S): 150 CAPSULE ORAL at 11:14

## 2017-10-01 RX ADMIN — AMIODARONE HYDROCHLORIDE 200 MILLIGRAM(S): 400 TABLET ORAL at 05:39

## 2017-10-01 RX ADMIN — SODIUM CHLORIDE 3 MILLILITER(S): 9 INJECTION INTRAMUSCULAR; INTRAVENOUS; SUBCUTANEOUS at 05:38

## 2017-10-01 RX ADMIN — OCTREOTIDE ACETATE 5 MICROGRAM(S)/HR: 200 INJECTION, SOLUTION INTRAVENOUS; SUBCUTANEOUS at 09:01

## 2017-10-01 RX ADMIN — Medication 325 MILLIGRAM(S): at 11:14

## 2017-10-01 RX ADMIN — MEXILETINE HYDROCHLORIDE 150 MILLIGRAM(S): 150 CAPSULE ORAL at 21:35

## 2017-10-01 RX ADMIN — PANTOPRAZOLE SODIUM 40 MILLIGRAM(S): 20 TABLET, DELAYED RELEASE ORAL at 05:40

## 2017-10-01 RX ADMIN — PANTOPRAZOLE SODIUM 40 MILLIGRAM(S): 20 TABLET, DELAYED RELEASE ORAL at 17:35

## 2017-10-01 RX ADMIN — SODIUM CHLORIDE 3 MILLILITER(S): 9 INJECTION INTRAMUSCULAR; INTRAVENOUS; SUBCUTANEOUS at 22:16

## 2017-10-01 RX ADMIN — Medication 20 MILLIGRAM(S): at 11:14

## 2017-10-01 RX ADMIN — SODIUM CHLORIDE 3 MILLILITER(S): 9 INJECTION INTRAMUSCULAR; INTRAVENOUS; SUBCUTANEOUS at 13:38

## 2017-10-01 RX ADMIN — Medication 500 MILLIGRAM(S): at 11:14

## 2017-10-01 RX ADMIN — Medication 1 MILLIGRAM(S): at 11:14

## 2017-10-01 RX ADMIN — Medication 25 MILLIGRAM(S): at 11:15

## 2017-10-01 RX ADMIN — CARVEDILOL PHOSPHATE 12.5 MILLIGRAM(S): 80 CAPSULE, EXTENDED RELEASE ORAL at 17:35

## 2017-10-01 RX ADMIN — SPIRONOLACTONE 25 MILLIGRAM(S): 25 TABLET, FILM COATED ORAL at 05:39

## 2017-10-01 RX ADMIN — QUETIAPINE FUMARATE 50 MILLIGRAM(S): 200 TABLET, FILM COATED ORAL at 21:35

## 2017-10-01 RX ADMIN — CARVEDILOL PHOSPHATE 12.5 MILLIGRAM(S): 80 CAPSULE, EXTENDED RELEASE ORAL at 05:39

## 2017-10-01 RX ADMIN — Medication 25 MILLIGRAM(S): at 21:35

## 2017-10-01 RX ADMIN — MEXILETINE HYDROCHLORIDE 150 MILLIGRAM(S): 150 CAPSULE ORAL at 05:39

## 2017-10-01 RX ADMIN — Medication 25 MILLIGRAM(S): at 05:39

## 2017-10-01 RX ADMIN — Medication 1 APPLICATION(S): at 22:13

## 2017-10-01 NOTE — PROGRESS NOTE ADULT - SUBJECTIVE AND OBJECTIVE BOX
VITAL SIGNS-Tele: Sr 70-90     Vital Signs Last 24 Hrs  T(C): 36.6 (10-01-17 @ 11:00), Max: 37.4 (17 @ 20:20)  HR: 76 (10-01-17 @ 11:00) (75 - 85)  BP: 115/92 (10-01-17 @ 11:00) (87/61 - 116/85)  SpO2: 99% (10-01-17 @ 11:00) (94% - 100%)          @ :  -  10-01 @ 07:00  --------------------------------------------------------  IN: 1650 mL / OUT: 1625 mL / NET: 25 mL    10-01 @ 07:  -  10-01 @ 14:54  --------------------------------------------------------  IN: 720 mL / OUT: 675 mL / NET: 45 mL    Daily     Daily Weight in k.9 (01 Oct 2017 08:24)    Coumadin    [ ] YES          [ x ]      NO         Reason:    2nd  GIbleed - + capsule study   PHYSICAL EXAM:  Neurology: alert and oriented x 3, nonfocal, no gross deficits  CV : +LVAD sounds  Sternal Wound :  CDI , Stable  Lungs: CTA  Abdomen: soft, nontender, nondistended, positive bowel sounds, last bowel movement         Extremities:     No edema, No calf    pantoprazole  Injectable 40 milliGRAM(s) IV Push every 12 hours  amiodarone    Tablet 200 milliGRAM(s) Oral daily  ferrous    sulfate 325 milliGRAM(s) Oral daily  folic acid 1 milliGRAM(s) Oral daily  mexiletine 150 milliGRAM(s) Oral every 8 hours  QUEtiapine 50 milliGRAM(s) Oral daily  ascorbic acid 500 milliGRAM(s) Oral daily  sodium chloride 0.9% lock flush 3 milliLiter(s) IV Push every 8 hours  acetaminophen   Tablet. 650 milliGRAM(s) Oral every 6 hours PRN  carvedilol 12.5 milliGRAM(s) Oral every 12 hours  spironolactone 25 milliGRAM(s) Oral daily  furosemide    Tablet 20 milliGRAM(s) Oral every other day  octreotide  Infusion 25 MICROgram(s)/Hr IV Continuous <Continuous>  hydrALAZINE 25 milliGRAM(s) Oral every 8 hours      Physical Therapy Rec:   Home  [ X ]   Home w/ PT  [  ]  Rehab  [  ]  Discussed with Cardiothoracic Team at AM rounds.

## 2017-10-01 NOTE — PROGRESS NOTE ADULT - SUBJECTIVE AND OBJECTIVE BOX
[facility] - Heart Failure    24H hour events: no acute events overnight, pt without complaints. tele monitoring reveals NSR with 1 AVB at 60-90 bpm. 6 beats of WCT overnight. no further bright red/black stools    MEDICATIONS:  amiodarone    Tablet 200 milliGRAM(s) Oral daily  mexiletine 150 milliGRAM(s) Oral every 8 hours  carvedilol 12.5 milliGRAM(s) Oral every 12 hours  spironolactone 25 milliGRAM(s) Oral daily  furosemide    Tablet 20 milliGRAM(s) Oral every other day  hydrALAZINE 25 milliGRAM(s) Oral every 8 hours        QUEtiapine 50 milliGRAM(s) Oral daily  acetaminophen   Tablet. 650 milliGRAM(s) Oral every 6 hours PRN    pantoprazole  Injectable 40 milliGRAM(s) IV Push every 12 hours    octreotide  Infusion 25 MICROgram(s)/Hr IV Continuous <Continuous>    ferrous    sulfate 325 milliGRAM(s) Oral daily  folic acid 1 milliGRAM(s) Oral daily  ascorbic acid 500 milliGRAM(s) Oral daily      REVIEW OF SYSTEMS:  pt denies HA, AMS, CP, SOB, n/v/abd pain, dizziness, syncope    PHYSICAL EXAM:  T(C): 36.6 (10-01-17 @ 11:00), Max: 37.4 (09-30-17 @ 20:20)  HR: 76 (10-01-17 @ 11:00) (75 - 85)  BP: 115/92 (10-01-17 @ 11:00) (87/61 - 116/85)  RR: 18 (10-01-17 @ 11:00) (18 - 18)  SpO2: 99% (10-01-17 @ 11:00) (94% - 100%)  Wt(kg): --  I&O's Summary    30 Sep 2017 07:01  -  01 Oct 2017 07:00  --------------------------------------------------------  IN: 1650 mL / OUT: 1625 mL / NET: 25 mL    01 Oct 2017 07:01  -  01 Oct 2017 12:19  --------------------------------------------------------  IN: 480 mL / OUT: 375 mL / NET: 105 mL        Appearance: Normal	  HEENT:   Normal oral mucosa, PERRL, EOMI	  Lymphatic: No lymphadenopathy  Cardiovascular: Normal S1 S2, No JVD, No murmurs, No edema  Respiratory: Lungs clear to auscultation	  Psychiatry: A & O x 3, Mood & affect appropriate  Gastrointestinal:  Soft, Non-tender, + BS	  Skin: No rashes, No ecchymoses, No cyanosis	  Neurologic: Non-focal  Extremities: Normal range of motion, No clubbing, cyanosis or edema  Vascular: Peripheral pulses palpable 2+ bilaterally        LABS:	 	    CBC Full  -  ( 01 Oct 2017 05:04 )  WBC Count : 11.2 K/uL  Hemoglobin : 10.6 g/dL  Hematocrit : 32.6 %  Platelet Count - Automated : 277 K/uL  Mean Cell Volume : 90.1 fl  Mean Cell Hemoglobin : 29.2 pg  Mean Cell Hemoglobin Concentration : 32.4 gm/dL  Auto Neutrophil # : x  Auto Lymphocyte # : x  Auto Monocyte # : x  Auto Eosinophil # : x  Auto Basophil # : x  Auto Neutrophil % : x  Auto Lymphocyte % : x  Auto Monocyte % : x  Auto Eosinophil % : x  Auto Basophil % : x    10-01    135  |  101  |  17  ----------------------------<  113<H>  4.5   |  21<L>  |  1.09  09-30    135  |  104  |  16  ----------------------------<  118<H>  4.7   |  19<L>  |  1.05    Ca    9.0      01 Oct 2017 05:04  Ca    8.8      30 Sep 2017 05:06    TPro  7.1  /  Alb  3.1<L>  /  TBili  0.4  /  DBili  x   /  AST  30  /  ALT  38  /  AlkPhos  93  10-01  TPro  7.3  /  Alb  3.2<L>  /  TBili  0.4  /  DBili  x   /  AST  46<H>  /  ALT  50<H>  /  AlkPhos  105  09-30      proBNP:   Lipid Profile:   HgA1c:   TSH:       CARDIAC MARKERS:            TELEMETRY: 	    ECG:  	  RADIOLOGY:  OTHER: 	    PREVIOUS DIAGNOSTIC TESTING:    [ ] Echocardiogram:  [ ]  Catheterization:  [ ] Stress Test:  	  	  ASSESSMENT/PLAN: 	    67M pmhx of NICM with LVEF 20%, ACC/AHA stage sp HMII LVAD 6/12/2017 as BTD, prior VT on amiodarone and mexilitine w/ [employer] dual lead ICD, 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    1 UGIB likely 2/2 AVMs:   - MAP ~80  - Hg improved, now stable  - GI following, GI eval in progress  - holding coumadin, ASA  - protonix 40mg ivp bid, ocrtreotide gtt      2NICM sp HM II LVAD:  - Current settings:  flow 4.9L/M.  speed 9200 PI 6.0 power 5.2W  -continue coreg 12.5 BID, lasix 20 po QOD, hydralazine  - cont renee 25 QD  # prior VT with [employer] ICD: cont mexilitine 150mg tid, amio 200 qd,  coreg as above    3 VT  - mexilitine 150 po TID      4 depression: cont quetiapine 50 qhs.           Sammy Gabriel MD [id number]

## 2017-10-01 NOTE — PROGRESS NOTE ADULT - ASSESSMENT
67M Hx LVAD 6/12/17 readmitted 9/25 with GIB... HCT 18.  Admitted to CTU transfused.  Hx of AV malformation.  His HCT stabilized.  Transferred to [address] [place] [address] 9/28. s/p Capsule study  revealing  GIB in small bowel.    9/28 C/O right wrist/thumb pain.  Xray showed arthritis ?subluxation of thumb pt with hx of gout started on colchicine  9/29 coreg increased to 12.5mg po bid, diurectics resumed ( aldactone 25mg daily and [id number] every other day_   + capsule study started on  sandostatin  (octreotide gtt)  AC  on hold   10/1 - INR =1.42- no coumadin x 3 mos. following 2nd GI bleed  d/c planning

## 2017-10-02 DIAGNOSIS — R55 SYNCOPE AND COLLAPSE: ICD-10-CM

## 2017-10-02 LAB
ANION GAP SERPL CALC-SCNC: 12 MMOL/L — SIGNIFICANT CHANGE UP (ref 5–17)
APTT BLD: 30.2 SEC — SIGNIFICANT CHANGE UP (ref 27.5–37.4)
BUN SERPL-MCNC: 21 MG/DL — SIGNIFICANT CHANGE UP (ref 7–23)
CALCIUM SERPL-MCNC: 9 MG/DL — SIGNIFICANT CHANGE UP (ref 8.4–10.5)
CHLORIDE SERPL-SCNC: 102 MMOL/L — SIGNIFICANT CHANGE UP (ref 96–108)
CO2 SERPL-SCNC: 23 MMOL/L — SIGNIFICANT CHANGE UP (ref 22–31)
CREAT SERPL-MCNC: 1.22 MG/DL — SIGNIFICANT CHANGE UP (ref 0.5–1.3)
GLUCOSE SERPL-MCNC: 117 MG/DL — HIGH (ref 70–99)
HCT VFR BLD CALC: 32.5 % — LOW (ref 39–50)
HGB BLD-MCNC: 10.6 G/DL — LOW (ref 13–17)
INR BLD: 1.42 RATIO — HIGH (ref 0.88–1.16)
MCHC RBC-ENTMCNC: 29.4 PG — SIGNIFICANT CHANGE UP (ref 27–34)
MCHC RBC-ENTMCNC: 32.7 GM/DL — SIGNIFICANT CHANGE UP (ref 32–36)
MCV RBC AUTO: 89.8 FL — SIGNIFICANT CHANGE UP (ref 80–100)
PLATELET # BLD AUTO: 277 K/UL — SIGNIFICANT CHANGE UP (ref 150–400)
POTASSIUM SERPL-MCNC: 4.5 MMOL/L — SIGNIFICANT CHANGE UP (ref 3.5–5.3)
POTASSIUM SERPL-SCNC: 4.5 MMOL/L — SIGNIFICANT CHANGE UP (ref 3.5–5.3)
PROTHROM AB SERPL-ACNC: 15.5 SEC — HIGH (ref 9.8–12.7)
RBC # BLD: 3.62 M/UL — LOW (ref 4.2–5.8)
RBC # FLD: 17.4 % — HIGH (ref 10.3–14.5)
SODIUM SERPL-SCNC: 137 MMOL/L — SIGNIFICANT CHANGE UP (ref 135–145)
WBC # BLD: 11.4 K/UL — HIGH (ref 3.8–10.5)
WBC # FLD AUTO: 11.4 K/UL — HIGH (ref 3.8–10.5)

## 2017-10-02 PROCEDURE — 93306 TTE W/DOPPLER COMPLETE: CPT | Mod: 26

## 2017-10-02 PROCEDURE — 99232 SBSQ HOSP IP/OBS MODERATE 35: CPT | Mod: GC

## 2017-10-02 RX ADMIN — Medication 25 MILLIGRAM(S): at 21:22

## 2017-10-02 RX ADMIN — Medication 1 APPLICATION(S): at 17:26

## 2017-10-02 RX ADMIN — Medication 1 APPLICATION(S): at 05:28

## 2017-10-02 RX ADMIN — MEXILETINE HYDROCHLORIDE 150 MILLIGRAM(S): 150 CAPSULE ORAL at 05:27

## 2017-10-02 RX ADMIN — PANTOPRAZOLE SODIUM 40 MILLIGRAM(S): 20 TABLET, DELAYED RELEASE ORAL at 05:28

## 2017-10-02 RX ADMIN — SODIUM CHLORIDE 3 MILLILITER(S): 9 INJECTION INTRAMUSCULAR; INTRAVENOUS; SUBCUTANEOUS at 21:22

## 2017-10-02 RX ADMIN — CARVEDILOL PHOSPHATE 12.5 MILLIGRAM(S): 80 CAPSULE, EXTENDED RELEASE ORAL at 17:26

## 2017-10-02 RX ADMIN — Medication 1 MILLIGRAM(S): at 12:19

## 2017-10-02 RX ADMIN — MEXILETINE HYDROCHLORIDE 150 MILLIGRAM(S): 150 CAPSULE ORAL at 14:32

## 2017-10-02 RX ADMIN — Medication 500 MILLIGRAM(S): at 12:19

## 2017-10-02 RX ADMIN — CARVEDILOL PHOSPHATE 12.5 MILLIGRAM(S): 80 CAPSULE, EXTENDED RELEASE ORAL at 05:27

## 2017-10-02 RX ADMIN — SODIUM CHLORIDE 3 MILLILITER(S): 9 INJECTION INTRAMUSCULAR; INTRAVENOUS; SUBCUTANEOUS at 05:28

## 2017-10-02 RX ADMIN — AMIODARONE HYDROCHLORIDE 200 MILLIGRAM(S): 400 TABLET ORAL at 05:27

## 2017-10-02 RX ADMIN — Medication 25 MILLIGRAM(S): at 05:27

## 2017-10-02 RX ADMIN — SPIRONOLACTONE 25 MILLIGRAM(S): 25 TABLET, FILM COATED ORAL at 05:28

## 2017-10-02 RX ADMIN — PANTOPRAZOLE SODIUM 40 MILLIGRAM(S): 20 TABLET, DELAYED RELEASE ORAL at 17:26

## 2017-10-02 RX ADMIN — Medication 325 MILLIGRAM(S): at 12:19

## 2017-10-02 RX ADMIN — Medication 25 MILLIGRAM(S): at 14:32

## 2017-10-02 RX ADMIN — QUETIAPINE FUMARATE 50 MILLIGRAM(S): 200 TABLET, FILM COATED ORAL at 21:22

## 2017-10-02 RX ADMIN — SODIUM CHLORIDE 3 MILLILITER(S): 9 INJECTION INTRAMUSCULAR; INTRAVENOUS; SUBCUTANEOUS at 14:06

## 2017-10-02 RX ADMIN — OCTREOTIDE ACETATE 5 MICROGRAM(S)/HR: 200 INJECTION, SOLUTION INTRAVENOUS; SUBCUTANEOUS at 06:25

## 2017-10-02 RX ADMIN — MEXILETINE HYDROCHLORIDE 150 MILLIGRAM(S): 150 CAPSULE ORAL at 21:22

## 2017-10-02 NOTE — PROGRESS NOTE ADULT - SUBJECTIVE AND OBJECTIVE BOX
I feel better    VITAL SIGNS    Telemetry:   nsr 70    Vital Signs Last 24 Hrs  T(C): 37 (10-02-17 @ 05:25), Max: 37.1 (10-01-17 @ 21:32)  T(F): 98.6 (10-02-17 @ 05:25), Max: 98.8 (10-01-17 @ 21:32)  HR: 66 (10-02-17 @ 08:50) (66 - 83)  BP: 111/74 (10-02-17 @ 08:50) (88/59 - 111/78)  RR: 18 (10-02-17 @ 08:50) (18 - 18)  SpO2: 100% (10-02-17 @ 08:50) (99% - 100%)                   10-01 @ 07:01  -  10-02 @ 07:00  --------------------------------------------------------  IN: 1185 mL / OUT: 1695 mL / NET: -510 mL    10-02 @ 07:01  -  10-02 @ 12:50  --------------------------------------------------------  IN: 200 mL / OUT: 400 mL / NET: -200 mL          Daily     Daily Weight in k.3 (02 Oct 2017 07:49)        CAPILLARY BLOOD GLUCOSE  187 (02 Oct 2017 11:10)  133 (02 Oct 2017 08:07)                      Coumadin    [ ] YES          [ x ]      NO         Reason:     GI bleed                          PHYSICAL EXAM        Neurology: alert and oriented x 3, nonfocal, no gross deficits  CV : S1 S2 , RRR   Sternal Wound :  CDI , Stable    Lungs:cta    Abdomen: soft, nontender, nondistended, positive bowel sounds, last bowel movement   :         voiding    Extremities:         edema, negative calve tenderness,             pantoprazole  Injectable 40 milliGRAM(s) IV Push every 12 hours  amiodarone    Tablet 200 milliGRAM(s) Oral daily  ferrous    sulfate 325 milliGRAM(s) Oral daily  folic acid 1 milliGRAM(s) Oral daily  mexiletine 150 milliGRAM(s) Oral every 8 hours  QUEtiapine 50 milliGRAM(s) Oral daily  ascorbic acid 500 milliGRAM(s) Oral daily  sodium chloride 0.9% lock flush 3 milliLiter(s) IV Push every 8 hours  acetaminophen   Tablet. 650 milliGRAM(s) Oral every 6 hours PRN  carvedilol 12.5 milliGRAM(s) Oral every 12 hours  spironolactone 25 milliGRAM(s) Oral daily  furosemide    Tablet 20 milliGRAM(s) Oral every other day  octreotide  Infusion 25 MICROgram(s)/Hr IV Continuous <Continuous>  hydrALAZINE 25 milliGRAM(s) Oral every 8 hours  hydrocortisone 1% Cream 1 Application(s) Topical two times a day                    Physical Therapy Rec:   Home  [  ]   Home w/ PT  [  ]  Rehab  [  ]  Discussed with Cardiothoracic Team at AM rounds.

## 2017-10-02 NOTE — PROGRESS NOTE ADULT - ASSESSMENT
67M Hx LVAD 6/12/17 readmitted 9/25 with GIB... HCT 18.  Admitted to CTU transfused.  Hx of AV malformation.  His HCT stabilized.    Transferred to [address] [place] [address] 9/28. s/p Capsule study  revealing  GIB in small bowel.    9/28 C/O right wrist/thumb pain.  Xray showed arthritis ?subluxation of thumb pt with hx of gout started on colchicine  9/29 coreg increased to 12.5mg po bid, diurectics resumed ( aldactone 25mg daily and [id number] every other day_   + capsule study started on  sandostatin  (octreotide gtt) . AC  on hold   10/1 - INR =1.42- no coumadin x 3 mos. following 2nd GI bleed  d/c planning  10/2 syncope, witnessed, probable hypotension, MAP 84, however the pt normally has a /74  250IVF given.  Echo pending

## 2017-10-02 NOTE — PROGRESS NOTE ADULT - SUBJECTIVE AND OBJECTIVE BOX
24H hour events:   feels well.  had bm this morning that was wnl   right wrist pain resolved    hgb remains stable    d/w pt re:transplantation, is interested, family support includes brother who whas been available to see him    tele: sinus     MEDICATIONS:  amiodarone    Tablet 200 milliGRAM(s) Oral daily  mexiletine 150 milliGRAM(s) Oral every 8 hours  carvedilol 12.5 milliGRAM(s) Oral every 12 hours  spironolactone 25 milliGRAM(s) Oral daily  furosemide    Tablet 20 milliGRAM(s) Oral every other day  hydrALAZINE 25 milliGRAM(s) Oral every 8 hours        QUEtiapine 50 milliGRAM(s) Oral daily  acetaminophen   Tablet. 650 milliGRAM(s) Oral every 6 hours PRN    pantoprazole  Injectable 40 milliGRAM(s) IV Push every 12 hours    octreotide  Infusion 25 MICROgram(s)/Hr IV Continuous <Continuous>    ferrous    sulfate 325 milliGRAM(s) Oral daily  folic acid 1 milliGRAM(s) Oral daily  ascorbic acid 500 milliGRAM(s) Oral daily  hydrocortisone 1% Cream 1 Application(s) Topical two times a day      REVIEW OF SYSTEMS:  General: no fatigue/malaise, weight loss/gain.  Skin: no rashes.  Ophthalmologic: no blurred vision, no loss of vision. 	  ENT: no sore throat, rhinorrhea, sinus congestion.  Respiratory: no SOB, cough or wheeze.  Gastrointestinal:  no N/V/D, no melena/hematemesis/hematochezia.  Genitourinary: no dysuria/hesitancy or hematuria.  Musculoskeletal: no myalgias or arthralgias.  Neurological: no changes in vision or hearing, no lightheadedness/dizziness, no syncope/near syncope	  Psychiatric: no unusual stress/anxiety.   Hematology/Lymphatics: no unusual bleeding, bruising and no lymphadenopathy.  Endocrine: no unusual thirst.   All others negative except as stated above and in HPI.    PHYSICAL EXAM:  T(C): 36.5 (10-02-17 @ 13:53), Max: 37.1 (10-01-17 @ 21:32)  HR: 71 (10-02-17 @ 13:53) (66 - 83)  BP: 108/78 (10-02-17 @ 13:53) (88/59 - 111/78)  RR: 18 (10-02-17 @ 13:53) (18 - 18)  SpO2: 99% (10-02-17 @ 13:53) (99% - 100%)  Wt(kg): --  I&O's Summary    01 Oct 2017 07:01  -  02 Oct 2017 07:00  --------------------------------------------------------  IN: 1185 mL / OUT: 1695 mL / NET: -510 mL    02 Oct 2017 07:01  -  02 Oct 2017 16:18  --------------------------------------------------------  IN: 500 mL / OUT: 400 mL / NET: 100 mL        Appearance: Normal	  HEENT:   Normal oral mucosa, PERRL, EOMI	  Lymphatic: No lymphadenopathy  Cardiovascular: Normal S1 S2, jvd 8cm, No murmurs, No edema  Respiratory: Lungs clear to auscultation	  Psychiatry: A & O x 3, Mood & affect appropriate  Gastrointestinal:  Soft, Non-tender, + BS	  Skin: No rashes, No ecchymoses, No cyanosis	  Neurologic: Non-focal  Extremities: Normal range of motion, No clubbing, cyanosis or edema  Vascular: Peripheral pulses palpable 2+ bilaterally        LABS:	 	    CBC Full  -  ( 02 Oct 2017 06:10 )  WBC Count : 11.4 K/uL  Hemoglobin : 10.6 g/dL  Hematocrit : 32.5 %  Platelet Count - Automated : 277 K/uL  Mean Cell Volume : 89.8 fl  Mean Cell Hemoglobin : 29.4 pg  Mean Cell Hemoglobin Concentration : 32.7 gm/dL  Auto Neutrophil # : x  Auto Lymphocyte # : x  Auto Monocyte # : x  Auto Eosinophil # : x  Auto Basophil # : x  Auto Neutrophil % : x  Auto Lymphocyte % : x  Auto Monocyte % : x  Auto Eosinophil % : x  Auto Basophil % : x    10-02    137  |  102  |  21  ----------------------------<  117<H>  4.5   |  23  |  1.22  10-01    135  |  101  |  17  ----------------------------<  113<H>  4.5   |  21<L>  |  1.09    Ca    9.0      02 Oct 2017 06:10  Ca    9.0      01 Oct 2017 05:04    TPro  7.1  /  Alb  3.1<L>  /  TBili  0.4  /  DBili  x   /  AST  30  /  ALT  38  /  AlkPhos  93  10-01

## 2017-10-02 NOTE — PROGRESS NOTE ADULT - SUBJECTIVE AND OBJECTIVE BOX
ADVANCED ENDOSCOPY PROGRESS NOTE:     Chief Complaint:  Patient is a 67y old  Male who presents with a chief complaint of melena, Anemia. (25 Sep 2017 22:44)    Interval Events:  - s/p Push enteroscopy that was negative for any source of bleeding  - one non-bloody BM overnight  - no nausea, no vomiting  - c/o right hand swelling and pain with movement        Allergies:  No Known Allergies      Home Medications: reviewed     Hospital Medications:  pantoprazole  Injectable 40 milliGRAM(s) IV Push every 12 hours  amiodarone    Tablet 200 milliGRAM(s) Oral daily  carvedilol 6.25 milliGRAM(s) Oral every 12 hours  ferrous    sulfate 325 milliGRAM(s) Oral daily  folic acid 1 milliGRAM(s) Oral daily  furosemide    Tablet 20 milliGRAM(s) Oral every other day  mexiletine 150 milliGRAM(s) Oral every 8 hours  QUEtiapine 50 milliGRAM(s) Oral daily  acetaminophen   Tablet 650 milliGRAM(s) Oral every 8 hours PRN  ascorbic acid 500 milliGRAM(s) Oral daily  sodium chloride 0.9%. 1000 milliLiter(s) IV Continuous <Continuous>      PMHX/PSHX:  GIB (gastrointestinal bleeding)  H/O prior ablation treatment  Ventricular fibrillation  PAF (paroxysmal atrial fibrillation)  Non-Ischemic Cardiomyopathy  SVT (Supraventricular Tachycardia)  HTN  CHF (Congestive Heart Failure)  LVAD (left ventricular assist device) present  Status post left hip replacement  Hypertension  Hypertension  History of Prior Ablation Treatment  AICD (Automatic Cardioverter/Defibrillator) Present      Family history:  No pertinent family history in first degree relatives      Social History: no current tobacco, ETOH, or IVDA     ROS:     General:  No wt loss, fevers, chills, night sweats, fatigue,   Eyes:  Good vision, no reported pain  ENT:  No sore throat, pain, runny nose, dysphagia  CV:  No pain, palpitations, hypo/hypertension  Resp:  No dyspnea, cough, tachypnea, wheezing  GI:  No pain, No nausea, No vomiting, No diarrhea, No constipation, No weight loss, No fever, No pruritis, No rectal bleeding, + tarry stools, No dysphagia,  :  No pain, bleeding, incontinence, nocturia  Muscle:  +right hand pain   Neuro:  No weakness, tingling, memory problems  Psych:  No fatigue, insomnia, mood problems, depression  Endocrine:  No polyuria, polydipsia, cold/heat intolerance  Heme:  No petechiae, ecchymosis, easy bruisability  Skin:  No rash, tattoos, scars, edema      PHYSICAL EXAM:     GENERAL:  Appears stated age, well-groomed, well-nourished, no distress  HEENT:  NC/AT,  conjunctivae clear and pink, no thyromegaly, nodules, adenopathy, no JVD, sclera -anicteric  CHEST:  Full & symmetric excursion, no increased effort, breath sounds clear  HEART:  +LVAD   ABDOMEN:  Soft, non-tender, non-distended, normoactive bowel sounds,  no masses ,no hepato-splenomegaly, no signs of chronic liver disease  EXTEREMITIES:  no cyanosis,clubbing or edema; right hand with tenderness to palpation   SKIN:  No rash/erythema/ecchymoses/petechiae/wounds/abscess/warm/dry  NEURO:  Alert, oriented x 3, no asterixis, no tremor, no encephalopathy        Vital Signs:  Vital Signs Last 24 Hrs  T(C): 36.6 (29 Sep 2017 05:30), Max: 36.8 (29 Sep 2017 00:16)  T(F): 97.9 (29 Sep 2017 05:30), Max: 98.3 (29 Sep 2017 00:16)  HR: 83 (29 Sep 2017 00:16) (64 - 83)  BP: --  BP(mean): 86 (29 Sep 2017 05:30) (82 - 98)  RR: 20 (29 Sep 2017 05:30) (18 - 28)  SpO2: 99% (29 Sep 2017 05:30) (94% - 100%)  Daily     Daily     LABS:                        10.6   11.5  )-----------( 275      ( 29 Sep 2017 06:26 )             32.4   Hemoglobin: 10.6 g/dL (09-29-17 @ 06:26)  Hemoglobin: 9.9 g/dL (09-28-17 @ 04:40)  Hemoglobin: 9.2 g/dL (09-27-17 @ 23:39)  Hemoglobin: 10.0 g/dL (09-27-17 @ 02:06)  Hemoglobin: 10.0 g/dL (09-26-17 @ 17:55)    09-29    136  |  105  |  17  ----------------------------<  103<H>  4.2   |  20<L>  |  1.15    Ca    8.8      29 Sep 2017 06:26  Phos  4.3     09-28  Mg     2.0     09-28    TPro  6.6  /  Alb  3.1<L>  /  TBili  0.5  /  DBili  x   /  AST  53<H>  /  ALT  39  /  AlkPhos  95  09-28    LIVER FUNCTIONS - ( 28 Sep 2017 04:40 )  Alb: 3.1 g/dL / Pro: 6.6 g/dL / ALK PHOS: 95 U/L / ALT: 39 U/L RC / AST: 53 U/L / GGT: x           PT/INR - ( 29 Sep 2017 06:26 )   PT: 16.7 sec;   INR: 1.52 ratio         PTT - ( 28 Sep 2017 04:40 )  PTT:33.9 sec    Amylase Serum75      Lipase serum--       Ammonia--      Imaging:    < from: Enteroscopy (08.23.17 @ 12:06) >  [facility]  ____________________________________________________________________________________________________  Patient Name: Yoseph Baez                       MRN: [id number]  Account Number: [id number]                     YOB: 1950  Room: Endoscopy Room 2                           Gender: Male  Attending MD: Quentin James MD                    Procedure Date No Time: 8/23/2017  ____________________________________________________________________________________________________     Procedure:           Upper endoscopy/Push Enteroscopy  Indications:         Gastrointestinal bleeding (melena), Blood loss anemia, Small bowel capsule                        showing angioectasias in proximal small bowel, LVAD in place.  Providers:           Quentin James MD, Christos Pickett MD (Fellow)  Referring MD:        Ayaz Barr  Medicines:           Monitored Anesthesia Care  Complications:       No immediate complications.  ____________________________________________________________________________________________________  Procedure:           Pre-Anesthesia Assessment:                       - Prior to the procedure, a History and Physical was performed, and patient                        medications, allergies and sensitivities were reviewed. The patient's                        tolerance of previous anesthesia was reviewed.                       - The risks and benefits of the procedure and the sedation options and risks                        were discussed with the patient. All questions were answered and informed                        consent was obtained.                       - Patient identification and proposed procedure were verified prior to the                        procedure by the physician. The procedure was verified in the endoscopy suite.                       After obtaining informed consent, the endoscope was passed under direct                        vision. Throughout the procedure, the patient's blood pressure, pulse, and                        oxygen saturations were monitored continuously. The pediatric colonoscope was                        introduced through the mouth and advanced to the proximal/mid jejunum. The                        small bowel enteroscopy wasaccomplished without difficulty. The patient                        tolerated the procedure well.                                                                                                        Findings:       The examined esophagus was normal.       A few, non-bleeding erosions were found in the stomach.       Three nonbleeding angioectasias measuring up to 3 mm were found in the proximal-mid jejunum.        Coagulation for hemostasis using argon plasma at 0.5 liters/minute and 20 raymond was        successful.                                                                                                        Impression:          - Non-bleeding erosive gastropathy.                       - Three non-bleeding angiodysplastic lesions inthe jejunum. Treated with                        argon plasma coagulation (APC).  Recommendation:      - [facility].                       - Clear liquid diet, advance tomorrow if feeling well.     - Monitor H/H, transfuse accordingly    < end of copied text >      < from: Enteroscopy (07.25.17 @ 12:08) >  [facility]  ____________________________________________________________________________________________________  Patient Name: Yoseph Baez                       MRN: [id number]  Account Number: [id number]                     YOB: 1950  Room: Endoscopy Room 1                           Gender: Male  Attending MD: DOMINIK PASCUAL MD                 Procedure Date No Time: 7/25/2017  ____________________________________________________________________________________________________     Procedure:           Small bowel enteroscopy  Indications:         Melena  Providers:           DOMINIK PASCUAL MD, Christos Pickett MD (Fellow)  Referring MD:        Ayaz Barr  Medicines:           Monitored Anesthesia Care  Complications:       No immediate complications.  ____________________________________________________________________________________________________  Procedure:           Pre-Anesthesia Assessment:                       - Prior to the procedure, a History and Physical was performed, and patient                        medications, allergies and sensitivities were reviewed. The patient's                        tolerance of previous anesthesia was reviewed.                       - The risks and benefits of the procedure and the sedation options and risks                        were discussed with the patient. All questions were answered and informed                        consent was obtained.                       - Patient identificationand proposed procedure were verified prior to the   Surgical Pathology Report (04.13.17 @ 13:00)    Surgical Pathology Report:   ACCESSION No:  [id number]    YOSEPH BAEZ                         2        Surgical Final Report          Final Diagnosis    1. Stomach, antrum, biopsy:  - Gastric antral mucosa with intestinal metaplasia and  chronic gastritis.  - Negative for dysplasia.  - Negative for Helicobacter pylori (special stain).    2. Gastric ulcer, biopsy:  - Gastric antral-oxyntic mucosa with intestinal metaplasia  in background of  ulceration and chronic active gastritis.  - Negative for dysplasia (additional levels x3 examined).  - Negative for Helicobacter pylori (special stain and  immunohistochemistry).    Slide(s) with built in immunohistochemical study control(s)  associated and negative control with this case has/have been  verified by the sign out pathologist.  These immunohistochemical/ in-situ hybridization tests have been  developed and their performance characteristics determined by  [facility], Department of  Pathology, Division of Immunopathology, [address] [place] [address].  It has not been cleared or approved by the  U.S. Food and Drug Administration.  The FDA has determined that  such clearance or approval is not necessary.  This test is used  for clinical purposes.  The laboratory iscertified under the  CLIA-88 as qualified to perform high  complexity clinical testing.    rAiel Johnson M.D.  (Electronic Signature)  Reported on: 04/18/17    Clinical History  66 years old with CAF, gastric ulcer.  EGD    Specimen(s) Submitted  1-Antrum biopsy  2- Gastric ulcer    Gross Description  04/13/17 17:55  1.   The specimen is received in formalin and the specimen  container is labeled: Antrum.  It consists of one fragments of  tan-pink, soft tissue, measuring 0.3 cm in greatest dimension.  Entirely submitted.  One cassette.            YOSEPH BAEZ                         2        Surgical Final Report            2.   The specimen is received in formalin and the specimen  container is labeled: Gastric ulcer.  It consists of three  fragments of tan-pink, soft tissue, ranging from 0.1-0.3  cm in  greatest dimension.  Entirely submitted.  One cassette.    In addition to other data that may appear on the specimen  containers, all labels have been inspected to confirm the  presenceof the patient's name and date of birth.    XW  Thu  April 13, 2017 05:56 PM                         procedure by the physician. The procedure was verified in the endoscopy suite.                       After obtaining informed consent, the endoscope was passed under direct                     vision. Throughout the procedure, the patient's blood pressure, pulse, and                        oxygen saturations were monitored continuously. The Colonoscope was                        introduced through the mouth and advanced to the proximal jejunum. The small                        bowel enteroscopy was accomplished without difficulty. The patient tolerated                        the procedure well.                              Findings:       The examined esophagus was normal.       The entire examined stomach was normal.       There was no evidence of significant pathology in the entire examined duodenum.       Two angioectasias with no bleeding were found in the proximal jejunum. Coagulation for        hemostasis using bipolar probe was successful.                                                                                                        Impression:          - Normal esophagus,stomach and duodenum.                       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 Treated with bipolar cautery.                       - No specimens collected.  Recommendation:      - [facility].                  - Plan for small bowel capsule study today. ADVANCED ENDOSCOPY PROGRESS NOTE:     Chief Complaint:  Patient is a 67y old  Male who presents with a chief complaint of melena, Anemia. (25 Sep 2017 22:44)    Interval Events:  - s/p Push enteroscopy last week that was negative for any source of bleeding; capsule endoscopy shows active bleeding in proximal small bowel (4min into small bowel)  - patient with episode of melena over weekend  - no bowel movements so far today  - eating breakfast this morning without nausea or vomiting  - denies lightheadedness or dizziness  - hand pain is improved      Allergies:  No Known Allergies      Home Medications: reviewed     Hospital Medications:  pantoprazole  Injectable 40 milliGRAM(s) IV Push every 12 hours  amiodarone    Tablet 200 milliGRAM(s) Oral daily  carvedilol 6.25 milliGRAM(s) Oral every 12 hours  ferrous    sulfate 325 milliGRAM(s) Oral daily  folic acid 1 milliGRAM(s) Oral daily  furosemide    Tablet 20 milliGRAM(s) Oral every other day  mexiletine 150 milliGRAM(s) Oral every 8 hours  QUEtiapine 50 milliGRAM(s) Oral daily  acetaminophen   Tablet 650 milliGRAM(s) Oral every 8 hours PRN  ascorbic acid 500 milliGRAM(s) Oral daily  sodium chloride 0.9%. 1000 milliLiter(s) IV Continuous <Continuous>      PMHX/PSHX:  GIB (gastrointestinal bleeding)  H/O prior ablation treatment  Ventricular fibrillation  PAF (paroxysmal atrial fibrillation)  Non-Ischemic Cardiomyopathy  SVT (Supraventricular Tachycardia)  HTN  CHF (Congestive Heart Failure)  LVAD (left ventricular assist device) present  Status post left hip replacement  Hypertension  Hypertension  History of Prior Ablation Treatment  AICD (Automatic Cardioverter/Defibrillator) Present      Family history:  No pertinent family history in first degree relatives      Social History: no current tobacco, ETOH, or IVDA     ROS:     General:  No wt loss, fevers, chills, night sweats, fatigue,   Eyes:  Good vision, no reported pain  ENT:  No sore throat, pain, runny nose, dysphagia  CV:  No pain, palpitations, hypo/hypertension  Resp:  No dyspnea, cough, tachypnea, wheezing  GI:  No pain, No nausea, No vomiting, No diarrhea, No constipation, No weight loss, No fever, No pruritis, No rectal bleeding, + tarry stools, No dysphagia,  :  No pain, bleeding, incontinence, nocturia  Muscle:  +right hand pain   Neuro:  No weakness, tingling, memory problems  Psych:  No fatigue, insomnia, mood problems, depression  Endocrine:  No polyuria, polydipsia, cold/heat intolerance  Heme:  No petechiae, ecchymosis, easy bruisability  Skin:  No rash, tattoos, scars, edema      PHYSICAL EXAM:     GENERAL:  Appears stated age, well-groomed, well-nourished, no distress  HEENT:  NC/AT,  conjunctivae clear and pink, no thyromegaly, nodules, adenopathy, no JVD, sclera -anicteric  CHEST:  Full & symmetric excursion, no increased effort, breath sounds clear  HEART:  +LVAD   ABDOMEN:  Soft, non-tender, non-distended, normoactive bowel sounds,  no masses ,no hepato-splenomegaly, no signs of chronic liver disease  EXTEREMITIES:  no cyanosis,clubbing or edema; right hand with tenderness to palpation   SKIN:  No rash/erythema/ecchymoses/petechiae/wounds/abscess/warm/dry  NEURO:  Alert, oriented x 3, no asterixis, no tremor, no encephalopathy        Vital Signs:  Vital Signs Last 24 Hrs  T(C): 37 (02 Oct 2017 05:25), Max: 37.1 (01 Oct 2017 21:32)  T(F): 98.6 (02 Oct 2017 05:25), Max: 98.8 (01 Oct 2017 21:32)  HR: 66 (02 Oct 2017 08:50) (66 - 83)  BP: 111/74 (02 Oct 2017 08:50) (88/59 - 115/92)  BP(mean): 86 (02 Oct 2017 08:50) (69 - 93)  RR: 18 (02 Oct 2017 08:50) (18 - 18)  SpO2: 100% (02 Oct 2017 08:50) (99% - 100%)  Daily     Daily Weight in k.3 (02 Oct 2017 07:49)    LABS:                        10.6   11.4  )-----------( 277      ( 02 Oct 2017 06:10 )             32.5   Hemoglobin: 10.6 g/dL (10-02-17 @ 06:10)  Hemoglobin: 10.6 g/dL (10-01-17 @ 05:04)  Hemoglobin: 10.9 g/dL (17 @ 05:06)  Hemoglobin: 10.6 g/dL (17 @ 06:26)  Hemoglobin: 9.9 g/dL (17 @ 04:40)      10-02    137  |  102  |  21  ----------------------------<  117<H>  4.5   |  23  |  1.22    Ca    9.0      02 Oct 2017 06:10    TPro  7.1  /  Alb  3.1<L>  /  TBili  0.4  /  DBili  x   /  AST  30  /  ALT  38  /  AlkPhos  93  10-01    LIVER FUNCTIONS - ( 01 Oct 2017 05:04 )  Alb: 3.1 g/dL / Pro: 7.1 g/dL / ALK PHOS: 93 U/L / ALT: 38 U/L RC / AST: 30 U/L / GGT: x           PT/INR - ( 02 Oct 2017 06:10 )   PT: 15.5 sec;   INR: 1.42 ratio         PTT - ( 02 Oct 2017 06:10 )  PTT:30.2 sec              Imaging:    < from: Enteroscopy (17 @ 12:06) >  [facility]  ____________________________________________________________________________________________________  Patient Name: Yoseph Baez                       MRN: [id number]  Account Number: [id number]                     YOB: 1950  Room: Endoscopy Room 2                           Gender: Male  Attending MD: Quentin James MD                    Procedure Date No Time: 2017  ____________________________________________________________________________________________________     Procedure:           Upper endoscopy/Push Enteroscopy  Indications:         Gastrointestinal bleeding (melena), Blood loss anemia, Small bowel capsule                        showing angioectasias in proximal small bowel, LVAD in place.  Providers:           Quentin James MD, Christos Pickett MD (Fellow)  Referring MD:        Ayaz Barr  Medicines:           Monitored Anesthesia Care  Complications:       No immediate complications.  ____________________________________________________________________________________________________  Procedure:           Pre-Anesthesia Assessment:                       - Prior to the procedure, a History and Physical was performed, and patient                        medications, allergies and sensitivities were reviewed. The patient's                        tolerance of previous anesthesia was reviewed.                       - The risks and benefits of the procedure and the sedation options and risks                        were discussed with the patient. All questions were answered and informed                        consent was obtained.                       - Patient identification and proposed procedure were verified prior to the                        procedure by the physician. The procedure was verified in the endoscopy suite.                       After obtaining informed consent, the endoscope was passed under direct                        vision. Throughout the procedure, the patient's blood pressure, pulse, and                        oxygen saturations were monitored continuously. The pediatric colonoscope was                        introduced through the mouth and advanced to the proximal/mid jejunum. The                        small bowel enteroscopy wasaccomplished without difficulty. The patient                        tolerated the procedure well.                                                                                                        Findings:       The examined esophagus was normal.       A few, non-bleeding erosions were found in the stomach.       Three nonbleeding angioectasias measuring up to 3 mm were found in the proximal-mid jejunum.        Coagulation for hemostasis using argon plasma at 0.5 liters/minute and 20 raymond was        successful.                                                                                                        Impression:          - Non-bleeding erosive gastropathy.                       - Three non-bleeding angiodysplastic lesions inthe jejunum. Treated with                        argon plasma coagulation (APC).  Recommendation:      - [facility] to [facility].                       - Clear liquid diet, advance tomorrow if feeling well.     - Monitor H/H, transfuse accordingly    < end of copied text >      < from: Enteroscopy (17 @ 12:08) [facility]  ____________________________________________________________________________________________________  Patient Name: Yoseph Baez                       MRN: [id number]  Account Number: [id number]                     YOB: 1950  Room: Endoscopy Room 1                           Gender: Male  Attending MD: DOMINIK PASCUAL MD                 Procedure Date No Time: 2017  ____________________________________________________________________________________________________     Procedure:           Small bowel enteroscopy  Indications:         Melena  Providers:           DOMINIK PASCUAL MD, Christos Pickett MD (Fellow)  Referring MD:        Ayaz Barr  Medicines:           Monitored Anesthesia Care  Complications:       No immediate complications.  ____________________________________________________________________________________________________  Procedure:           Pre-Anesthesia Assessment:                       - Prior to the procedure, a History and Physical was performed, and patient                        medications, allergies and sensitivities were reviewed. The patient's                        tolerance of previous anesthesia was reviewed.                       - The risks and benefits of the procedure and the sedation options and risks                        were discussed with the patient. All questions were answered and informed                        consent was obtained.                       - Patient identificationand proposed procedure were verified prior to the   Surgical Pathology Report (17 @ 13:00)    Surgical Pathology Report:   ACCESSION No:  [id number]    YVONNE YOSEPH                         2        Surgical Final Report          Final Diagnosis    1. Stomach, antrum, biopsy:  - Gastric antral mucosa with intestinal metaplasia and  chronic gastritis.  - Negative for dysplasia.  - Negative for Helicobacter pylori (special stain).    2. Gastric ulcer, biopsy:  - Gastric antral-oxyntic mucosa with intestinal metaplasia  in background of  ulceration and chronic active gastritis.  - Negative for dysplasia (additional levels x3 examined).  - Negative for Helicobacter pylori (special stain and  immunohistochemistry).    Slide(s) with built in immunohistochemical study control(s)  associated and negative control with this case has/have been  verified by the sign out pathologist.  These immunohistochemical/ in-situ hybridization tests have been  developed and their performance characteristics determined by  [facility], Department of  Pathology, Division of Immunopathology, [address] [address] [place] [address].  It has not been cleared or approved by the  U.S. Food and Drug Administration.  The FDA has determined that  such clearance or approval is not necessary.  This test is used  for clinical purposes.  The laboratory iscertified under the  CLIA-88 as qualified to perform high  complexity clinical testing.    Ariel Johnson M.D.  (Electronic Signature)  Reported on: 17    Clinical History  66 years old with CAF, gastric ulcer.  EGD    Specimen(s) Submitted  1-Antrum biopsy  2- Gastric ulcer    Gross Description  17 17:55  1.   The specimen is received in formalin and the specimen  container is labeled: Antrum.  It consists of one fragments of  tan-pink, soft tissue, measuring 0.3 cm in greatest dimension.  Entirely submitted.  One cassette.            YOSEPH BAEZ                         2        Surgical Final Report            2.   The specimen is received in formalin and the specimen  container is labeled: Gastric ulcer.  It consists of three  fragments of tan-pink, soft tissue, ranging from 0.1-0.3  cm in  greatest dimension.  Entirely submitted.  One cassette.    In addition to other data that may appear on the specimen  containers, all labels have been inspected to confirm the  presenceof the patient's name and date of birth.    XW  Thu  2017 05:56 PM                         procedure by the physician. The procedure was verified in the endoscopy suite.                       After obtaining informed consent, the endoscope was passed under direct                     vision. Throughout the procedure, the patient's blood pressure, pulse, and                        oxygen saturations were monitored continuously. The Colonoscope was                        introduced through the mouth and advanced to the proximal jejunum. The small                        bowel enteroscopy was accomplished without difficulty. The patient tolerated                        the procedure well.                              Findings:       The examined esophagus was normal.       The entire examined stomach was normal.       There was no evidence of significant pathology in the entire examined duodenum.       Two angioectasias with no bleeding were found in the proximal jejunum. Coagulation for        hemostasis using bipolar probe was successful.                                                                                                        Impression:          - Normal esophagus,stomach and duodenum.                       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 Treated with bipolar cautery.                       - No specimens collected.  Recommendation:      - [facility].                  - Plan for small bowel capsule study today. ADVANCED ENDOSCOPY PROGRESS NOTE:     Chief Complaint:  Patient is a 67y old  Male who presents with a chief complaint of melena, Anemia. (25 Sep 2017 22:44)    Interval Events:  - s/p Push enteroscopy last week that was negative for any source of bleeding; capsule endoscopy shows active bleeding in proximal small bowel (4min into small bowel)  - started on Octreotide Friday   - patient with episode of melena over weekend  - no bowel movements so far today  - eating breakfast this morning without nausea or vomiting  - denies lightheadedness or dizziness  - hand pain is improved      Allergies:  No Known Allergies      Home Medications: reviewed     Hospital Medications:  pantoprazole  Injectable 40 milliGRAM(s) IV Push every 12 hours  amiodarone    Tablet 200 milliGRAM(s) Oral daily  carvedilol 6.25 milliGRAM(s) Oral every 12 hours  ferrous    sulfate 325 milliGRAM(s) Oral daily  folic acid 1 milliGRAM(s) Oral daily  furosemide    Tablet 20 milliGRAM(s) Oral every other day  mexiletine 150 milliGRAM(s) Oral every 8 hours  QUEtiapine 50 milliGRAM(s) Oral daily  acetaminophen   Tablet 650 milliGRAM(s) Oral every 8 hours PRN  ascorbic acid 500 milliGRAM(s) Oral daily  sodium chloride 0.9%. 1000 milliLiter(s) IV Continuous <Continuous>      PMHX/PSHX:  GIB (gastrointestinal bleeding)  H/O prior ablation treatment  Ventricular fibrillation  PAF (paroxysmal atrial fibrillation)  Non-Ischemic Cardiomyopathy  SVT (Supraventricular Tachycardia)  HTN  CHF (Congestive Heart Failure)  LVAD (left ventricular assist device) present  Status post left hip replacement  Hypertension  Hypertension  History of Prior Ablation Treatment  AICD (Automatic Cardioverter/Defibrillator) Present      Family history:  No pertinent family history in first degree relatives      Social History: no current tobacco, ETOH, or IVDA     ROS:     General:  No wt loss, fevers, chills, night sweats, fatigue,   Eyes:  Good vision, no reported pain  ENT:  No sore throat, pain, runny nose, dysphagia  CV:  No pain, palpitations, hypo/hypertension  Resp:  No dyspnea, cough, tachypnea, wheezing  GI:  No pain, No nausea, No vomiting, No diarrhea, No constipation, No weight loss, No fever, No pruritis, No rectal bleeding, + tarry stools, No dysphagia,  :  No pain, bleeding, incontinence, nocturia  Muscle:  +right hand pain   Neuro:  No weakness, tingling, memory problems  Psych:  No fatigue, insomnia, mood problems, depression  Endocrine:  No polyuria, polydipsia, cold/heat intolerance  Heme:  No petechiae, ecchymosis, easy bruisability  Skin:  No rash, tattoos, scars, edema      PHYSICAL EXAM:     GENERAL:  Appears stated age, well-groomed, well-nourished, no distress  HEENT:  NC/AT,  conjunctivae clear and pink, no thyromegaly, nodules, adenopathy, no JVD, sclera -anicteric  CHEST:  Full & symmetric excursion, no increased effort, breath sounds clear  HEART:  +LVAD   ABDOMEN:  Soft, non-tender, non-distended, normoactive bowel sounds,  no masses ,no hepato-splenomegaly, no signs of chronic liver disease  EXTEREMITIES:  no cyanosis,clubbing or edema; right hand with tenderness to palpation   SKIN:  No rash/erythema/ecchymoses/petechiae/wounds/abscess/warm/dry  NEURO:  Alert, oriented x 3, no asterixis, no tremor, no encephalopathy        Vital Signs:  Vital Signs Last 24 Hrs  T(C): 37 (02 Oct 2017 05:25), Max: 37.1 (01 Oct 2017 21:32)  T(F): 98.6 (02 Oct 2017 05:25), Max: 98.8 (01 Oct 2017 21:32)  HR: 66 (02 Oct 2017 08:50) (66 - 83)  BP: 111/74 (02 Oct 2017 08:50) (88/59 - 115/92)  BP(mean): 86 (02 Oct 2017 08:50) (69 - 93)  RR: 18 (02 Oct 2017 08:50) (18 - 18)  SpO2: 100% (02 Oct 2017 08:50) (99% - 100%)  Daily     Daily Weight in k.3 (02 Oct 2017 07:49)    LABS:                        10.6   11.4  )-----------( 277      ( 02 Oct 2017 06:10 )             32.5   Hemoglobin: 10.6 g/dL (10-02-17 @ 06:10)  Hemoglobin: 10.6 g/dL (10-01-17 @ 05:04)  Hemoglobin: 10.9 g/dL (17 @ 05:06)  Hemoglobin: 10.6 g/dL (17 @ 06:26)  Hemoglobin: 9.9 g/dL (17 @ 04:40)      10-02    137  |  102  |  21  ----------------------------<  117<H>  4.5   |  23  |  1.22    Ca    9.0      02 Oct 2017 06:10    TPro  7.1  /  Alb  3.1<L>  /  TBili  0.4  /  DBili  x   /  AST  30  /  ALT  38  /  AlkPhos  93  10    LIVER FUNCTIONS - ( 01 Oct 2017 05:04 )  Alb: 3.1 g/dL / Pro: 7.1 g/dL / ALK PHOS: 93 U/L / ALT: 38 U/L RC / AST: 30 U/L / GGT: x           PT/INR - ( 02 Oct 2017 06:10 )   PT: 15.5 sec;   INR: 1.42 ratio         PTT - ( 02 Oct 2017 06:10 )  PTT:30.2 sec              Imaging:    < from: Enteroscopy (17 @ 12:06) >  [facility]  ____________________________________________________________________________________________________  Patient Name: Yoseph Baez                       MRN: [id number]  Account Number: [id number]                     YOB: 1950  Room: Endoscopy Room 2                           Gender: Male  Attending MD: Quentin James MD                    Procedure Date No Time: 2017  ____________________________________________________________________________________________________     Procedure:           Upper endoscopy/Push Enteroscopy  Indications:         Gastrointestinal bleeding (melena), Blood loss anemia, Small bowel capsule                        showing angioectasias in proximal small bowel, LVAD in place.  Providers:           Quentin James MD, Christos Pickett MD (Fellow)  Referring MD:        Ayaz Barr  Medicines:           Monitored Anesthesia Care  Complications:       No immediate complications.  ____________________________________________________________________________________________________  Procedure:           Pre-Anesthesia Assessment:                       - Prior to the procedure, a History and Physical was performed, and patient                        medications, allergies and sensitivities were reviewed. The patient's                        tolerance of previous anesthesia was reviewed.                       - The risks and benefits of the procedure and the sedation options and risks                        were discussed with the patient. All questions were answered and informed                        consent was obtained.                       - Patient identification and proposed procedure were verified prior to the                        procedure by the physician. The procedure was verified in the endoscopy suite.                       After obtaining informed consent, the endoscope was passed under direct                        vision. Throughout the procedure, the patient's blood pressure, pulse, and                        oxygen saturations were monitored continuously. The pediatric colonoscope was                        introduced through the mouth and advanced to the proximal/mid jejunum. The                        small bowel enteroscopy wasaccomplished without difficulty. The patient                        tolerated the procedure well.                                                                                                        Findings:       The examined esophagus was normal.       A few, non-bleeding erosions were found in the stomach.       Three nonbleeding angioectasias measuring up to 3 mm were found in the proximal-mid jejunum.        Coagulation for hemostasis using argon plasma at 0.5 liters/minute and 20 raymond was        successful.                                                                                                        Impression:          - Non-bleeding erosive gastropathy.                       - Three non-bleeding angiodysplastic lesions inthe jejunum. Treated with                        argon plasma coagulation (APC).  Recommendation:      - [facility].                       - Clear liquid diet, advance tomorrow if feeling well.     - Monitor H/H, transfuse accordingly    < end of copied text >      < from: Enteroscopy (17 @ 12:08) >  [facility]  ____________________________________________________________________________________________________  Patient Name: Yoseph Baez                       MRN: [id number]  Account Number: [id number]                     YOB: 1950  Room: Endoscopy Room 1                           Gender: Male  Attending MD: DOMINIK PASCUAL MD                 Procedure Date No Time: 2017  ____________________________________________________________________________________________________     Procedure:           Small bowel enteroscopy  Indications:         Melena  Providers:           DOMINIK PASCUAL MD, Christos Pickett MD (Fellow)  Referring MD:        Ayaz Barr  Medicines:           Monitored Anesthesia Care  Complications:       No immediate complications.  ____________________________________________________________________________________________________  Procedure:           Pre-Anesthesia Assessment:                       - Prior to the procedure, a History and Physical was performed, and patient                        medications, allergies and sensitivities were reviewed. The patient's                        tolerance of previous anesthesia was reviewed.                       - The risks and benefits of the procedure and the sedation options and risks                        were discussed with the patient. All questions were answered and informed                        consent was obtained.                       - Patient identificationand proposed procedure were verified prior to the   Surgical Pathology Report (17 @ 13:00)    Surgical Pathology Report:   ACCESSION No:  [id number]    YOSEPH BAEZ                         2        Surgical Final Report          Final Diagnosis    1. Stomach, antrum, biopsy:  - Gastric antral mucosa with intestinal metaplasia and  chronic gastritis.  - Negative for dysplasia.  - Negative for Helicobacter pylori (special stain).    2. Gastric ulcer, biopsy:  - Gastric antral-oxyntic mucosa with intestinal metaplasia  in background of  ulceration and chronic active gastritis.  - Negative for dysplasia (additional levels x3 examined).  - Negative for Helicobacter pylori (special stain and  immunohistochemistry).    Slide(s) with built in immunohistochemical study control(s)  associated and negative control with this case has/have been  verified by the sign out pathologist.  These immunohistochemical/ in-situ hybridization tests have been  developed and their performance characteristics determined by  [facility], Department of  Pathology, Division of Immunopathology, [address] [place] [address].  It has not been cleared or approved by the  U.[place]. Food and Drug Administration.  The FDA has determined that  such clearance or approval is not necessary.  This test is used  for clinical purposes.  The laboratory iscertified under the  CLIA-88 as qualified to perform high  complexity clinical testing.    Ariel Johnson M.D.  (Electronic Signature)  Reported on: 17    Clinical History  66 years old with CAF, gastric ulcer.  EGD    Specimen(s) Submitted  1-Antrum biopsy  2- Gastric ulcer    Gross Description  17 17:55  1.   The specimen is received in formalin and the specimen  container is labeled: Antrum.  It consists of one fragments of  tan-pink, soft tissue, measuring 0.3 cm in greatest dimension.  Entirely submitted.  One cassette.            YOSEPH BAEZ                         2        Surgical Final Report            2.   The specimen is received in formalin and the specimen  container is labeled: Gastric ulcer.  It consists of three  fragments of tan-pink, soft tissue, ranging from 0.1-0.3  cm in  greatest dimension.  Entirely submitted.  One cassette.    In addition to other data that may appear on the specimen  containers, all labels have been inspected to confirm the  presenceof the patient's name and date of birth.    XW  Thu  2017 05:56 PM                         procedure by the physician. The procedure was verified in the endoscopy suite.                       After obtaining informed consent, the endoscope was passed under direct                     vision. Throughout the procedure, the patient's blood pressure, pulse, and                        oxygen saturations were monitored continuously. The Colonoscope was                        introduced through the mouth and advanced to the proximal jejunum. The small                        bowel enteroscopy was accomplished without difficulty. The patient tolerated                        the procedure well.                              Findings:       The examined esophagus was normal.       The entire examined stomach was normal.       There was no evidence of significant pathology in the entire examined duodenum.       Two angioectasias with no bleeding were found in the proximal jejunum. Coagulation for        hemostasis using bipolar probe was successful.                                                                                                        Impression:          - Normal esophagus,stomach and duodenum.                       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 Treated with bipolar cautery.                       - No specimens collected.  Recommendation:      - [facility].                  - Plan for small bowel capsule study today.

## 2017-10-02 NOTE — CHART NOTE - NSCHARTNOTEFT_GEN_A_CORE
Source: Patient [X ]    Family [ ]     other [ X] RN, medical record     Pt seen for malnutrition follow up. Pt reports a good PO intake in house, RD witnessed breakfast tray with 100% consumed. Pt denies any nutritional complaints at this time. Pt offering food preferences, and is consuming Health shakes daily. Pt amenable to HF nutrition therapy education review.     Per chart, Pt admitted with GI bleed. S/p EGD and capsule study, no active sources of bleeding, likely AVMs. Coumadin on hold     Diet : Regular, low Na      Patient reports no GI distress, last BM yesterday      PO intake:  100%     Source for PO intake [X] Patient [ X] chart       Admission Wt: 70.3kg,   Current Weight: 68.3kg, decrease noted, likely related to fluid shifts.     Pertinent Medications: MEDICATIONS  (STANDING):  pantoprazole  Injectable 40 milliGRAM(s) IV Push every 12 hours  amiodarone    Tablet 200 milliGRAM(s) Oral daily  ferrous    sulfate 325 milliGRAM(s) Oral daily  folic acid 1 milliGRAM(s) Oral daily  mexiletine 150 milliGRAM(s) Oral every 8 hours  QUEtiapine 50 milliGRAM(s) Oral daily  ascorbic acid 500 milliGRAM(s) Oral daily  sodium chloride 0.9% lock flush 3 milliLiter(s) IV Push every 8 hours  carvedilol 12.5 milliGRAM(s) Oral every 12 hours  spironolactone 25 milliGRAM(s) Oral daily  furosemide    Tablet 20 milliGRAM(s) Oral every other day  octreotide  Infusion 25 MICROgram(s)/Hr (5 mL/Hr) IV Continuous <Continuous>  hydrALAZINE 25 milliGRAM(s) Oral every 8 hours  hydrocortisone 1% Cream 1 Application(s) Topical two times a day    MEDICATIONS  (PRN):  acetaminophen   Tablet. 650 milliGRAM(s) Oral every 6 hours PRN Moderate Pain (4 - 6)    Pertinent Labs:  Hgb/Hct:10.6/32.5-low, Na:137, K:4.5, Cl:102, BUN:21, Cr:1.22, Glucose:117-high, Ca:9.0, Total Protein:7.1, Albumin:3.1-low, Prealbumin:10, c-reactive protein:, Total Bilirubin:0.4, AlkPhos:93, AST:30, ALT:38 BG levels 10/2: 133    Skin: intact, +2 right wrist edema     Estimated Needs:   [X ] no change since previous assessment  [ ] recalculated:       Previous Nutrition Diagnosis:   [X ] Malnutrition          Nutrition Diagnosis is X[ ] ongoing, improving with good PO intake, and health shakes        New Nutrition Diagnosis: [ X] not applicable     Interventions:     Recommend    1. Provide food preferences as requested by Pt/family within diet restrictions    2. Encourage PO intake during meal times   3. Reviewed HF nutrition therapy   4. Continue health shakes      Monitoring and Evaluation:     [X ] PO intake [ X] Tolerance to diet prescription [X ] weights [X ] follow up per protocol    X] other: RD remains available Sarah Siegler RD. Pager #[phone]

## 2017-10-02 NOTE — PROGRESS NOTE ADULT - ASSESSMENT
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    1 UGIB likely 2/2 AVMs:  sp 2uprbc on admission 9/25/17 without any further transfusions as cbc has remained stable. sp egd which did nto reveal any ulcers of AVMS but sp pill capsule endoscopy which showed bleeding in proximal small bowel (4min into small bowel) and started on octreotide.   - cont with octreotide per GI, appreciate recs  - cont to hold coumadin/anti paltelet therapy  - protonix 40mg ivp bid, ocrtreotide gtt      #NICM sp HM II LVAD:  - Current settings:  flow 4.6L/M.  speed 9200 PI 6.9 power 5.2W  -continue home coreg 12.5 BID, lasix 20 po QOD,  cont renee 25 QD  - started on hydralazine 25 q8h   - however given multiple admissions for AVMS, will need to discuss long term options. Patient interested in heart transplant, blood B+ but with multiple transfusions. Will d/w [facility] re: transplantation options      # prior VT with [facility] ICD: cont mexilitine 150mg tid, amio 200 qd,  coreg as above      # depression: cont quetiapine 50 qhs.     Claudia Francis MD  CHF  [phone] (After 5pm and on weekends, please call [phone])

## 2017-10-02 NOTE — PROGRESS NOTE ADULT - ASSESSMENT
Impression:  1)Acute blood loss anemia (melena)- upper GI source, differential includes small bowel angioectasis, dieulafoy lesion  2)Severe systolic CHF s/p LVAD on anticoagulation    Plan:  1)Monitor Hgb daily and bowel movements   2)f/u results of video capsule endoscopy   3)c/w PPI PO daily   4)A/C per CHF/CT ICU team     Please call with questions  Marlin Borrego  GI Fellow  Pager: [phone] Impression:  1)Acute blood loss anemia (melena)- upper GI source, differential includes small bowel angioectasia  2)Severe systolic CHF s/p LVAD on anticoagulation    Plan:  1)Monitor Hgb daily and bowel movements   2)c/w PPI PO daily, c/w Octreotide   3)A/C per CHF/CT ICU team   4)May consider repeat push enteroscopy vs. single balloon enteroscopy if bleeding continues or drop in Hgb     Please call with questions  Marlin Borrego  GI Fellow  Pager: [phone]

## 2017-10-03 DIAGNOSIS — D50.0 IRON DEFICIENCY ANEMIA SECONDARY TO BLOOD LOSS (CHRONIC): ICD-10-CM

## 2017-10-03 DIAGNOSIS — R79.1 ABNORMAL COAGULATION PROFILE: ICD-10-CM

## 2017-10-03 LAB
ALBUMIN SERPL ELPH-MCNC: 3.1 G/DL — LOW (ref 3.3–5)
ALP SERPL-CCNC: 95 U/L — SIGNIFICANT CHANGE UP (ref 40–120)
ALT FLD-CCNC: 25 U/L RC — SIGNIFICANT CHANGE UP (ref 10–45)
ANION GAP SERPL CALC-SCNC: 14 MMOL/L — SIGNIFICANT CHANGE UP (ref 5–17)
APTT BLD: 29.3 SEC — SIGNIFICANT CHANGE UP (ref 27.5–37.4)
APTT BLD: 29.7 SEC — SIGNIFICANT CHANGE UP (ref 27.5–37.4)
AST SERPL-CCNC: 27 U/L — SIGNIFICANT CHANGE UP (ref 10–40)
BILIRUB SERPL-MCNC: 0.3 MG/DL — SIGNIFICANT CHANGE UP (ref 0.2–1.2)
BUN SERPL-MCNC: 21 MG/DL — SIGNIFICANT CHANGE UP (ref 7–23)
CALCIUM SERPL-MCNC: 8.6 MG/DL — SIGNIFICANT CHANGE UP (ref 8.4–10.5)
CHLORIDE SERPL-SCNC: 102 MMOL/L — SIGNIFICANT CHANGE UP (ref 96–108)
CO2 SERPL-SCNC: 22 MMOL/L — SIGNIFICANT CHANGE UP (ref 22–31)
CREAT SERPL-MCNC: 1.13 MG/DL — SIGNIFICANT CHANGE UP (ref 0.5–1.3)
GLUCOSE SERPL-MCNC: 114 MG/DL — HIGH (ref 70–99)
HCT VFR BLD CALC: 31.7 % — LOW (ref 39–50)
HGB BLD-MCNC: 10.3 G/DL — LOW (ref 13–17)
INR BLD: 1.32 RATIO — HIGH (ref 0.88–1.16)
INR BLD: 1.37 RATIO — HIGH (ref 0.88–1.16)
MCHC RBC-ENTMCNC: 29.3 PG — SIGNIFICANT CHANGE UP (ref 27–34)
MCHC RBC-ENTMCNC: 32.6 GM/DL — SIGNIFICANT CHANGE UP (ref 32–36)
MCV RBC AUTO: 89.7 FL — SIGNIFICANT CHANGE UP (ref 80–100)
PLATELET # BLD AUTO: 261 K/UL — SIGNIFICANT CHANGE UP (ref 150–400)
POTASSIUM SERPL-MCNC: 4.5 MMOL/L — SIGNIFICANT CHANGE UP (ref 3.5–5.3)
POTASSIUM SERPL-SCNC: 4.5 MMOL/L — SIGNIFICANT CHANGE UP (ref 3.5–5.3)
PROT SERPL-MCNC: 7 G/DL — SIGNIFICANT CHANGE UP (ref 6–8.3)
PROTHROM AB SERPL-ACNC: 14.3 SEC — HIGH (ref 9.8–12.7)
PROTHROM AB SERPL-ACNC: 15 SEC — HIGH (ref 9.8–12.7)
RBC # BLD: 3.53 M/UL — LOW (ref 4.2–5.8)
RBC # FLD: 17.3 % — HIGH (ref 10.3–14.5)
SODIUM SERPL-SCNC: 138 MMOL/L — SIGNIFICANT CHANGE UP (ref 135–145)
WBC # BLD: 11.5 K/UL — HIGH (ref 3.8–10.5)
WBC # FLD AUTO: 11.5 K/UL — HIGH (ref 3.8–10.5)

## 2017-10-03 PROCEDURE — 99233 SBSQ HOSP IP/OBS HIGH 50: CPT | Mod: GC

## 2017-10-03 PROCEDURE — 90834 PSYTX W PT 45 MINUTES: CPT

## 2017-10-03 PROCEDURE — 99232 SBSQ HOSP IP/OBS MODERATE 35: CPT | Mod: GC

## 2017-10-03 RX ADMIN — Medication 25 MILLIGRAM(S): at 21:39

## 2017-10-03 RX ADMIN — PANTOPRAZOLE SODIUM 40 MILLIGRAM(S): 20 TABLET, DELAYED RELEASE ORAL at 06:28

## 2017-10-03 RX ADMIN — Medication 25 MILLIGRAM(S): at 06:27

## 2017-10-03 RX ADMIN — Medication 1 APPLICATION(S): at 06:28

## 2017-10-03 RX ADMIN — Medication 1 MILLIGRAM(S): at 10:15

## 2017-10-03 RX ADMIN — PANTOPRAZOLE SODIUM 40 MILLIGRAM(S): 20 TABLET, DELAYED RELEASE ORAL at 18:43

## 2017-10-03 RX ADMIN — CARVEDILOL PHOSPHATE 12.5 MILLIGRAM(S): 80 CAPSULE, EXTENDED RELEASE ORAL at 18:43

## 2017-10-03 RX ADMIN — SODIUM CHLORIDE 3 MILLILITER(S): 9 INJECTION INTRAMUSCULAR; INTRAVENOUS; SUBCUTANEOUS at 06:23

## 2017-10-03 RX ADMIN — Medication 500 MILLIGRAM(S): at 10:14

## 2017-10-03 RX ADMIN — MEXILETINE HYDROCHLORIDE 150 MILLIGRAM(S): 150 CAPSULE ORAL at 21:39

## 2017-10-03 RX ADMIN — Medication 325 MILLIGRAM(S): at 10:15

## 2017-10-03 RX ADMIN — Medication 20 MILLIGRAM(S): at 10:15

## 2017-10-03 RX ADMIN — Medication 1 APPLICATION(S): at 18:43

## 2017-10-03 RX ADMIN — AMIODARONE HYDROCHLORIDE 200 MILLIGRAM(S): 400 TABLET ORAL at 06:27

## 2017-10-03 RX ADMIN — QUETIAPINE FUMARATE 50 MILLIGRAM(S): 200 TABLET, FILM COATED ORAL at 21:42

## 2017-10-03 RX ADMIN — SPIRONOLACTONE 25 MILLIGRAM(S): 25 TABLET, FILM COATED ORAL at 06:28

## 2017-10-03 RX ADMIN — CARVEDILOL PHOSPHATE 12.5 MILLIGRAM(S): 80 CAPSULE, EXTENDED RELEASE ORAL at 06:27

## 2017-10-03 RX ADMIN — Medication 25 MILLIGRAM(S): at 14:18

## 2017-10-03 RX ADMIN — MEXILETINE HYDROCHLORIDE 150 MILLIGRAM(S): 150 CAPSULE ORAL at 14:18

## 2017-10-03 RX ADMIN — MEXILETINE HYDROCHLORIDE 150 MILLIGRAM(S): 150 CAPSULE ORAL at 06:27

## 2017-10-03 RX ADMIN — SODIUM CHLORIDE 3 MILLILITER(S): 9 INJECTION INTRAMUSCULAR; INTRAVENOUS; SUBCUTANEOUS at 14:17

## 2017-10-03 RX ADMIN — SODIUM CHLORIDE 3 MILLILITER(S): 9 INJECTION INTRAMUSCULAR; INTRAVENOUS; SUBCUTANEOUS at 21:37

## 2017-10-03 NOTE — PROGRESS NOTE ADULT - ASSESSMENT
Coping well with hospitalization, "I know you are all doing what's best for me and I trust you." Brother (Nawaf) has been visiting; gets phone calls from other family members.  Reports he had discussion with Dr. Viramontes about pursuing heart transplant at [facility] and he is agreeable to the idea.  Continues to develop confidence in his ability to monitor his health and manage LVAD.   Has continued to remain in contact with team.   Has strong support from brother and close friend and has been more willing to ask others for help and support.  Mood upbeat.  Affect full range.  Sleeping ok.   Appetite good.  Ambulating in simms without distress.     DX:  Systolic heart failure; r/o Adjustment disorder with anxiety/depression     Recommendations:   Behavioral Cardiology will continue to follow

## 2017-10-03 NOTE — PROGRESS NOTE ADULT - PROBLEM SELECTOR PLAN 2
Sp LVAD   coreg 6.25 bid  melixitine
coreg12.5mg po bid, diuretics resumed ( aldactone 25mg daily and lasix 20mg every other day
coreg increased to 12.5mg po bid, diurectics resumed ( aldactone 25mg daily and [id number] every other day_
coreg12.5mg po bid, diuretics resumed ( aldactone 25mg daily and lasix 20mg every other day

## 2017-10-03 NOTE — PROGRESS NOTE ADULT - PROBLEM SELECTOR PROBLEM 2
CHF (Congestive Heart Failure)
Acute on chronic diastolic congestive heart failure

## 2017-10-03 NOTE — PROGRESS NOTE ADULT - ASSESSMENT
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    #NICM sp HM II LVAD:  - Current settings:  flow 4.6L/M.  speed 9200 PI 6.9 power 5.2W  -continue home coreg 12.5 BID, lasix 20 po QOD,  cont renee 25 QD  - started on hydralazine 25 q8h, given stable Cr, would switch to lisinopril 5 instead tomorrow  - however given multiple admissions for AVMS, will need to discuss long term options. Patient interested in heart transplant, blood B+ but with multiple transfusions. Have discussed patient in mtg this AM, Will reach out to [facility] re: transfer there for transplantation evaluation    # UGIB likely 2/2 AVMs:  sp 2uprbc on admission 9/25/17 without any further transfusions as cbc has remained stable. sp egd which did nto reveal any ulcers of AVMS but pill capsule endoscopy showed bleeding in proximal small bowel (4min into small bowel) and started on octreotide.   - cont with octreotide per GI, appreciate recs  - cont to hold coumadin/anti paltelet therapy  - protonix 40mg ivp bid, ocrtreotide gtt    # prior VT with [facility] ICD: cont mexilitine 150mg tid, amio 200 qd,  coreg as above      # depression: cont quetiapine 50 qhs.     Claudia Francis MD  CHF  [phone] (After 5pm and on weekends, please call [phone])

## 2017-10-03 NOTE — PROGRESS NOTE ADULT - ASSESSMENT
Impression:  1)Acute blood loss anemia (melena)- upper GI source, differential includes small bowel angioectasia  2)Severe systolic CHF s/p LVAD on anticoagulation    Plan:  1)Monitor Hgb daily and bowel movements   2)d/c IV PPI and change to PPI PO daily  3)Would change to Octreotide 100mcg subcutaneous BID- consider outpatient IM injections monthly  3)A/C per CHF/CT ICU team  4)May consider repeat push enteroscopy vs. single balloon enteroscopy if bleeding continues or drop in Hgb, would just monitor for now.      Please call with questions  Marlin Borrego  GI Fellow  Pager: [phone]

## 2017-10-03 NOTE — PROGRESS NOTE ADULT - SUBJECTIVE AND OBJECTIVE BOX
ADVANCED ENDOSCOPY PROGRESS NOTE:     Chief Complaint:  Patient is a 67y old  Male who presents with a chief complaint of melena, Anemia. (25 Sep 2017 22:44)    Interval Events:  - s/p Push enteroscopy last week that was negative for any source of bleeding; capsule endoscopy shows active bleeding in proximal small bowel (4min into small bowel)  - started on Octreotide Friday   - last episode of melena was over weekend  - yesterday patient had brown bowel movement   - denies lightheadedness or dizziness  - hand pain is improved      Allergies:  No Known Allergies      Home Medications: reviewed     Hospital Medications:  pantoprazole  Injectable 40 milliGRAM(s) IV Push every 12 hours  amiodarone    Tablet 200 milliGRAM(s) Oral daily  carvedilol 6.25 milliGRAM(s) Oral every 12 hours  ferrous    sulfate 325 milliGRAM(s) Oral daily  folic acid 1 milliGRAM(s) Oral daily  furosemide    Tablet 20 milliGRAM(s) Oral every other day  mexiletine 150 milliGRAM(s) Oral every 8 hours  QUEtiapine 50 milliGRAM(s) Oral daily  acetaminophen   Tablet 650 milliGRAM(s) Oral every 8 hours PRN  ascorbic acid 500 milliGRAM(s) Oral daily  sodium chloride 0.9%. 1000 milliLiter(s) IV Continuous <Continuous>      PMHX/PSHX:  GIB (gastrointestinal bleeding)  H/O prior ablation treatment  Ventricular fibrillation  PAF (paroxysmal atrial fibrillation)  Non-Ischemic Cardiomyopathy  SVT (Supraventricular Tachycardia)  HTN  CHF (Congestive Heart Failure)  LVAD (left ventricular assist device) present  Status post left hip replacement  Hypertension  Hypertension  History of Prior Ablation Treatment  AICD (Automatic Cardioverter/Defibrillator) Present      Family history:  No pertinent family history in first degree relatives      Social History: no current tobacco, ETOH, or IVDA     ROS:     General:  No wt loss, fevers, chills, night sweats, fatigue,   Eyes:  Good vision, no reported pain  ENT:  No sore throat, pain, runny nose, dysphagia  CV:  No pain, palpitations, hypo/hypertension  Resp:  No dyspnea, cough, tachypnea, wheezing  GI:  No pain, No nausea, No vomiting, No diarrhea, No constipation, No weight loss, No fever, No pruritis, No rectal bleeding, + tarry stools, No dysphagia,  :  No pain, bleeding, incontinence, nocturia  Muscle:  +right hand pain   Neuro:  No weakness, tingling, memory problems  Psych:  No fatigue, insomnia, mood problems, depression  Endocrine:  No polyuria, polydipsia, cold/heat intolerance  Heme:  No petechiae, ecchymosis, easy bruisability  Skin:  No rash, tattoos, scars, edema      PHYSICAL EXAM:     GENERAL:  Appears stated age, well-groomed, well-nourished, no distress  HEENT:  NC/AT,  conjunctivae clear and pink, no thyromegaly, nodules, adenopathy, no JVD, sclera -anicteric  CHEST:  Full & symmetric excursion, no increased effort, breath sounds clear  HEART:  +LVAD   ABDOMEN:  Soft, non-tender, non-distended, normoactive bowel sounds,  no masses ,no hepato-splenomegaly, no signs of chronic liver disease  EXTEREMITIES:  no cyanosis,clubbing or edema; right hand with tenderness to palpation   SKIN:  No rash/erythema/ecchymoses/petechiae/wounds/abscess/warm/dry  NEURO:  Alert, oriented x 3, no asterixis, no tremor, no encephalopathy          Vital Signs:  Vital Signs Last 24 Hrs  T(C): 36.5 (03 Oct 2017 05:20), Max: 36.7 (03 Oct 2017 01:05)  T(F): 97.7 (03 Oct 2017 05:20), Max: 98.1 (03 Oct 2017 01:05)  HR: 65 (03 Oct 2017 05:20) (65 - 80)  BP: 123/93 (03 Oct 2017 05:20) (104/79 - 123/93)  BP(mean): 103 (03 Oct 2017 05:20) (87 - 103)  RR: 17 (03 Oct 2017 05:20) (17 - 18)  SpO2: 99% (03 Oct 2017 05:20) (97% - 99%)  Daily     Daily Weight in k.2 (03 Oct 2017 08:00)    LABS:                        10.3   11.5  )-----------( 261      ( 03 Oct 2017 04:19 )             31.7   Hemoglobin: 10.3 g/dL (10-03-17 @ 04:19)  Hemoglobin: 10.6 g/dL (10-02-17 @ 06:10)  Hemoglobin: 10.6 g/dL (10-01-17 @ 05:04)  Hemoglobin: 10.9 g/dL (17 @ 05:06)  Hemoglobin: 10.6 g/dL (17 @ 06:26)    10-03    138  |  102  |  21  ----------------------------<  114<H>  4.5   |  22  |  1.13    Ca    8.6      03 Oct 2017 04:21    TPro  7.0  /  Alb  3.1<L>  /  TBili  0.3  /  DBili  x   /  AST  27  /  ALT  25  /  AlkPhos  95  1003    LIVER FUNCTIONS - ( 03 Oct 2017 04:21 )  Alb: 3.1 g/dL / Pro: 7.0 g/dL / ALK PHOS: 95 U/L / ALT: 25 U/L RC / AST: 27 U/L / GGT: x           PT/INR - ( 03 Oct 2017 04:19 )   PT: 15.0 sec;   INR: 1.37 ratio         PTT - ( 03 Oct 2017 04:19 )  PTT:29.3 sec                        Imaging:    < from: Enteroscopy (17 @ 12:06) >  [facility]  ____________________________________________________________________________________________________  Patient Name: Yoseph Baez                       MRN: [id number]  Account Number: [id number]                     YOB: 1950  Room: Endoscopy Room 2                           Gender: Male  Attending MD: Quentin James MD                    Procedure Date No Time: 2017  ____________________________________________________________________________________________________     Procedure:           Upper endoscopy/Push Enteroscopy  Indications:         Gastrointestinal bleeding (melena), Blood loss anemia, Small bowel capsule                        showing angioectasias in proximal small bowel, LVAD in place.  Providers:           Quentin James MD, Christos Pickett MD (Fellow)  Referring MD:        Ayaz Barr  Medicines:           Monitored Anesthesia Care  Complications:       No immediate complications.  ____________________________________________________________________________________________________  Procedure:           Pre-Anesthesia Assessment:                       - Prior to the procedure, a History and Physical was performed, and patient                        medications, allergies and sensitivities were reviewed. The patient's                        tolerance of previous anesthesia was reviewed.                       - The risks and benefits of the procedure and the sedation options and risks                        were discussed with the patient. All questions were answered and informed                        consent was obtained.                       - Patient identification and proposed procedure were verified prior to the                        procedure by the physician. The procedure was verified in the endoscopy suite.                       After obtaining informed consent, the endoscope was passed under direct                        vision. Throughout the procedure, the patient's blood pressure, pulse, and                        oxygen saturations were monitored continuously. The pediatric colonoscope was                        introduced through the mouth and advanced to the proximal/mid jejunum. The                        small bowel enteroscopy wasaccomplished without difficulty. The patient                        tolerated the procedure well.                                                                                                        Findings:       The examined esophagus was normal.       A few, non-bleeding erosions were found in the stomach.       Three nonbleeding angioectasias measuring up to 3 mm were found in the proximal-mid jejunum.        Coagulation for hemostasis using argon plasma at 0.5 liters/minute and 20 raymond was        successful.                                                                                                        Impression:          - Non-bleeding erosive gastropathy.                       - Three non-bleeding angiodysplastic lesions inthe jejunum. Treated with                        argon plasma coagulation (APC).  Recommendation:      - [facility] patient to [facility] for [facility] care.                       - Clear liquid diet, advance tomorrow if feeling well.     - Monitor H/H, transfuse accordingly    < end of copied text >      < from: Enteroscopy (17 @ 12:08) >  [facility]  ____________________________________________________________________________________________________  Patient Name: Yoseph Baez                       MRN: [id number]  Account Number: [id number]                     YOB: 1950  Room: Endoscopy Room 1                           Gender: Male  Attending MD: DOMINIK PASCUAL MD                 Procedure Date No Time: 2017  ____________________________________________________________________________________________________     Procedure:           Small bowel enteroscopy  Indications:         Melena  Providers:           DOMINIK PASCUAL MD, Christos Pickett MD (Fellow)  Referring MD:        Ayaz Barr  Medicines:           Monitored Anesthesia Care  Complications:       No immediate complications.  ____________________________________________________________________________________________________  Procedure:           Pre-Anesthesia Assessment:                       - Prior to the procedure, a History and Physical was performed, and patient                        medications, allergies and sensitivities were reviewed. The patient's                        tolerance of previous anesthesia was reviewed.                       - The risks and benefits of the procedure and the sedation options and risks                        were discussed with the patient. All questions were answered and informed                        consent was obtained.                       - Patient identificationand proposed procedure were verified prior to the   Surgical Pathology Report (17 @ 13:00)    Surgical Pathology Report:   ACCESSION No:  [id number]    YOSEPH BAEZ                         2        Surgical Final Report          Final Diagnosis    1. Stomach, antrum, biopsy:  - Gastric antral mucosa with intestinal metaplasia and  chronic gastritis.  - Negative for dysplasia.  - Negative for Helicobacter pylori (special stain).    2. Gastric ulcer, biopsy:  - Gastric antral-oxyntic mucosa with intestinal metaplasia  in background of  ulceration and chronic active gastritis.  - Negative for dysplasia (additional levels x3 examined).  - Negative for Helicobacter pylori (special stain and  immunohistochemistry).    Slide(s) with built in immunohistochemical study control(s)  associated and negative control with this case has/have been  verified by the sign out pathologist.  These immunohistochemical/ in-situ hybridization tests have been  developed and their performance characteristics determined by  [facility] / [facility], Department of  Pathology, Division of Immunopathology, [phone] [place] [address].  It has not been cleared or approved by the  U.[place]. Food and Drug Administration.  The FDA has determined that  such clearance or approval is not necessary.  This test is used  for clinical purposes.  The laboratory iscertified under the  CLIA-88 as qualified to perform high  complexity clinical testing.    Ariel Johnson M.D.  (Electronic Signature)  Reported on: 17    Clinical History  66 years old with CAF, gastric ulcer.  EGD    Specimen(s) Submitted  1-Antrum biopsy  2- Gastric ulcer    Gross Description  17 17:55  1.   The specimen is received in formalin and the specimen  container is labeled: Antrum.  It consists of one fragments of  tan-pink, soft tissue, measuring 0.3 cm in greatest dimension.  Entirely submitted.  One cassette.            YOSEPH BAEZ                         2        Surgical Final Report            2.   The specimen is received in formalin and the specimen  container is labeled: Gastric ulcer.  It consists of three  fragments of tan-pink, soft tissue, ranging from 0.1-0.3  cm in  greatest dimension.  Entirely submitted.  One cassette.    In addition to other data that may appear on the specimen  containers, all labels have been inspected to confirm the  presenceof the patient's name and date of birth.    XW  Thu  2017 05:56 PM                         procedure by the physician. The procedure was verified in the endoscopy suite.                       After obtaining informed consent, the endoscope was passed under direct                     vision. Throughout the procedure, the patient's blood pressure, pulse, and                        oxygen saturations were monitored continuously. The Colonoscope was                        introduced through the mouth and advanced to the proximal jejunum. The small                        bowel enteroscopy was accomplished without difficulty. The patient tolerated                        the procedure well.                              Findings:       The examined esophagus was normal.       The entire examined stomach was normal.       There was no evidence of significant pathology in the entire examined duodenum.       Two angioectasias with no bleeding were found in the proximal jejunum. Coagulation for        hemostasis using bipolar probe was successful.                                                                                                        Impression:          - Normal esophagus,stomach and duodenum.                       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 Treated with bipolar cautery.                       - No specimens collected.  Recommendation:      - Return patient to [facility] for ongoing care.                  - Plan for small bowel capsule study today.

## 2017-10-03 NOTE — PROGRESS NOTE ADULT - ASSESSMENT
67M Hx LVAD 6/12/17 readmitted 9/25 with GIB... HCT 18.  Admitted to CTU transfused.  Hx of AV malformation.  His HCT stabilized.    Transferred to [address] [place] [address] 9/28. s/p Capsule study  revealing  GIB in small bowel.    9/28 C/O right wrist/thumb pain.  Xray showed arthritis ?subluxation of thumb pt with hx of gout started on colchicine  9/29 coreg increased to 12.5mg po bid, diurectics resumed ( aldactone 25mg daily and [id number] every other day_   + capsule study started on  sandostatin  (octreotide gtt) . AC  on hold   10/1 - INR =1.42- no coumadin x 3 mos. following 2nd GI bleed  d/c planning  10/2 syncope, witnessed, probable hypotension, MAP 84, however the pt normally has a /74  250IVF given.  Echo pending  Echo mod Mr, neg effusion  Remains on octreotride, GI following

## 2017-10-03 NOTE — PROGRESS NOTE ADULT - SUBJECTIVE AND OBJECTIVE BOX
im feeling ok    VITAL SIGNS    Telemetry:   NSR 80    Vital Signs Last 24 Hrs  T(C): 36.8 (10-03-17 @ 10:07), Max: 36.8 (10-03-17 @ 10:07)  T(F): 98.2 (10-03-17 @ 10:07), Max: 98.2 (10-03-17 @ 10:07)  HR: 65 (10-03-17 @ 10:07) (65 - 80)  BP: 118/83 (10-03-17 @ 10:07) (104/79 - 123/93)  RR: 17 (10-03-17 @ 10:07) (17 - 18)  SpO2: 99% (10-03-17 @ 10:07) (97% - 99%)                   10-02 @ 07:01  -  10-03 @ 07:00  --------------------------------------------------------  IN: 1165 mL / OUT: 1650 mL / NET: -485 mL    10-03 @ 07:01  -  10-03 @ 11:55  --------------------------------------------------------  IN: 360 mL / OUT: 700 mL / NET: -340 mL          Daily     Daily Weight in k.2 (03 Oct 2017 08:00)        CAPILLARY BLOOD GLUCOSE                      Coumadin    [ ] YES          [ x ]      NO         Reason:     gi bleed                          PHYSICAL EXAM        Neurology: alert and oriented x 3, nonfocal, no gross deficits  CV : S1 S2 , RRR   Sternal Wound :  CDI , Stable  Lungs: cta    Abdomen: soft, nontender, nondistended, positive bowel sounds driveline dressing cdi  :           voiding  Extremities:     - edema - calve tenderness          acetaminophen   Tablet. 650 milliGRAM(s) Oral every 6 hours PRN  amiodarone    Tablet 200 milliGRAM(s) Oral daily  ascorbic acid 500 milliGRAM(s) Oral daily  carvedilol 12.5 milliGRAM(s) Oral every 12 hours  ferrous    sulfate 325 milliGRAM(s) Oral daily  folic acid 1 milliGRAM(s) Oral daily  furosemide    Tablet 20 milliGRAM(s) Oral every other day  hydrALAZINE 25 milliGRAM(s) Oral every 8 hours  hydrocortisone 1% Cream 1 Application(s) Topical two times a day  mexiletine 150 milliGRAM(s) Oral every 8 hours  octreotide  Infusion 25 MICROgram(s)/Hr IV Continuous <Continuous>  pantoprazole  Injectable 40 milliGRAM(s) IV Push every 12 hours  QUEtiapine 50 milliGRAM(s) Oral daily  sodium chloride 0.9% lock flush 3 milliLiter(s) IV Push every 8 hours  spironolactone 25 milliGRAM(s) Oral daily                    Physical Therapy Rec:   Home  [  ]   Home w/ PT  [  ]  Rehab  [  ]  Discussed with Cardiothoracic Team at AM rounds.

## 2017-10-03 NOTE — PROGRESS NOTE ADULT - SUBJECTIVE AND OBJECTIVE BOX
24H hour events:   no events overnight  hgb stable  cont on octreotide ggt  in good spirits    weight stable 68.2 from 68.3 yesterday standing weights    tele: reviewed, no events    MEDICATIONS:  amiodarone    Tablet 200 milliGRAM(s) Oral daily  carvedilol 12.5 milliGRAM(s) Oral every 12 hours  furosemide    Tablet 20 milliGRAM(s) Oral every other day  hydrALAZINE 25 milliGRAM(s) Oral every 8 hours  mexiletine 150 milliGRAM(s) Oral every 8 hours  spironolactone 25 milliGRAM(s) Oral daily        acetaminophen   Tablet. 650 milliGRAM(s) Oral every 6 hours PRN  QUEtiapine 50 milliGRAM(s) Oral daily    pantoprazole  Injectable 40 milliGRAM(s) IV Push every 12 hours    octreotide  Infusion 25 MICROgram(s)/Hr IV Continuous <Continuous>    ascorbic acid 500 milliGRAM(s) Oral daily  ferrous    sulfate 325 milliGRAM(s) Oral daily  folic acid 1 milliGRAM(s) Oral daily  hydrocortisone 1% Cream 1 Application(s) Topical two times a day      REVIEW OF SYSTEMS:  General: no fatigue/malaise, weight loss/gain.  Skin: no rashes.  Ophthalmologic: no blurred vision, no loss of vision. 	  ENT: no sore throat, rhinorrhea, sinus congestion.  Respiratory: no SOB, cough or wheeze.  Gastrointestinal:  no N/V/D, no melena/hematemesis/hematochezia.  Genitourinary: no dysuria/hesitancy or hematuria.  Musculoskeletal: no myalgias or arthralgias.  Neurological: no changes in vision or hearing, no lightheadedness/dizziness, no syncope/near syncope	  Psychiatric: no unusual stress/anxiety.   Hematology/Lymphatics: no unusual bleeding, bruising and no lymphadenopathy.  Endocrine: no unusual thirst.   All others negative except as stated above and in HPI.    PHYSICAL EXAM:  T(C): 36.8 (10-03-17 @ 10:07), Max: 36.8 (10-03-17 @ 10:07)  HR: 65 (10-03-17 @ 10:07) (65 - 80)  BP: 118/83 (10-03-17 @ 10:07) (104/79 - 123/93)  RR: 17 (10-03-17 @ 10:07) (17 - 18)  SpO2: 99% (10-03-17 @ 10:07) (97% - 99%)  Wt(kg): --  I&O's Summary    02 Oct 2017 07:01  -  03 Oct 2017 07:00  --------------------------------------------------------  IN: 1165 mL / OUT: 1650 mL / NET: -485 mL        Appearance: Normal	  HEENT:   Normal oral mucosa, PERRL, EOMI	  Lymphatic: No lymphadenopathy  Cardiovascular: Normal S1 S2, No JVD, No murmurs, No edema  Respiratory: Lungs clear to auscultation	  Psychiatry: A & O x 3, Mood & affect appropriate  Gastrointestinal:  Soft, Non-tender, + BS	  Skin: No rashes, No ecchymoses, No cyanosis	  Neurologic: Non-focal  Extremities: Normal range of motion, No clubbing, cyanosis or edema  Vascular: radial pulse 1+         LABS:	 	    CBC Full  -  ( 03 Oct 2017 04:19 )  WBC Count : 11.5 K/uL  Hemoglobin : 10.3 g/dL  Hematocrit : 31.7 %  Platelet Count - Automated : 261 K/uL  Mean Cell Volume : 89.7 fl  Mean Cell Hemoglobin : 29.3 pg  Mean Cell Hemoglobin Concentration : 32.6 gm/dL  Auto Neutrophil # : x  Auto Lymphocyte # : x  Auto Monocyte # : x  Auto Eosinophil # : x  Auto Basophil # : x  Auto Neutrophil % : x  Auto Lymphocyte % : x  Auto Monocyte % : x  Auto Eosinophil % : x  Auto Basophil % : x    10-03    138  |  102  |  21  ----------------------------<  114<H>  4.5   |  22  |  1.13  10-02    137  |  102  |  21  ----------------------------<  117<H>  4.5   |  23  |  1.22    Ca    8.6      03 Oct 2017 04:21  Ca    9.0      02 Oct 2017 06:10    TPro  7.0  /  Alb  3.1<L>  /  TBili  0.3  /  DBili  x   /  AST  27  /  ALT  25  /  AlkPhos  95  10-03

## 2017-10-03 NOTE — PROGRESS NOTE ADULT - PROBLEM SELECTOR PROBLEM 1
GIB (gastrointestinal bleeding)
Gastrointestinal hemorrhage, unspecified gastrointestinal hemorrhage type

## 2017-10-03 NOTE — CONSULT NOTE ADULT - ASSESSMENT
This is a 66yo man with chronic CHF s/p LVAD 6/12/17 and on warfarin who has had a post procedure course complicated by multiple GI bleeds 2/2 angioectasias (7/25, 8/23), admitted on 9/25 with GI bleed and noted to have prolonged PT/INR.

## 2017-10-03 NOTE — PROGRESS NOTE ADULT - SUBJECTIVE AND OBJECTIVE BOX
Behavioral Cardiology Progress Note     HPI:  Mr. Baez is a 68 y/o male with PMH of NICM with LVEF 20%, ACC/AHA stage s/p HMII LVAD 6/12/2017 as BTD, prior VT on amiodarone and mexilitine w/ [employer] [facility] dual lead ICD, 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    Behavioral Health Assessment:     Mood:  "I'm doing pretty good."          Current stressors:   Readmission to hospital   Adjustment to living with LVAD        Support system/family support: Good support from family and close friend      Coping strategies: Talking with family and staff, watching TV; thinking about his granddaughter     Understanding of medical illness and treatment plan:  Understands reasons for this admission and plan.  Good understanding of LVAD management; benefiting from ongoing education and review.  Agreeable to plan for pursuing heart transplant evaluation.      MSE: Pt seen resting in bed.  A&Ox3.  Well related with good eye contact.  Thought process goal directed.  No abnormal thought content; denies s/i.  Mood upbeat.  Affect full range.  Insight and judgment adequate.

## 2017-10-03 NOTE — CONSULT NOTE ADULT - SUBJECTIVE AND OBJECTIVE BOX
Hematology Consult Note    HPI:  68yo man with chronic CHF s/p LVAD 6/12/17 and on warfarin has post procedure course has been complicated by multiple GI bleeds 2/2 angioectasias (7/25, 8/23,)    PAST MEDICAL & SURGICAL HISTORY:  GIB (gastrointestinal bleeding)  Ventricular fibrillation: s/p AICD  PAF (paroxysmal atrial fibrillation): on xarelto  Non-Ischemic Cardiomyopathy  SVT (Supraventricular Tachycardia)  HTN  CHF (Congestive Heart Failure)  LVAD (left ventricular assist device) present  Status post left hip replacement  History of Prior Ablation Treatment: for afib  AICD (Automatic Cardioverter/Defibrillator) Present: [employer] with 1 [employer] lead4/1/09- explanted and replaced with [employer] 2 leads on 9/2/09      FAMILY HISTORY:  No pertinent family history in first degree relatives      MEDICATIONS  (STANDING):  amiodarone    Tablet 200 milliGRAM(s) Oral daily  ascorbic acid 500 milliGRAM(s) Oral daily  carvedilol 12.5 milliGRAM(s) Oral every 12 hours  ferrous    sulfate 325 milliGRAM(s) Oral daily  folic acid 1 milliGRAM(s) Oral daily  furosemide    Tablet 20 milliGRAM(s) Oral every other day  hydrALAZINE 25 milliGRAM(s) Oral every 8 hours  hydrocortisone 1% Cream 1 Application(s) Topical two times a day  mexiletine 150 milliGRAM(s) Oral every 8 hours  octreotide  Infusion 25 MICROgram(s)/Hr (5 mL/Hr) IV Continuous <Continuous>  pantoprazole  Injectable 40 milliGRAM(s) IV Push every 12 hours  QUEtiapine 50 milliGRAM(s) Oral daily  sodium chloride 0.9% lock flush 3 milliLiter(s) IV Push every 8 hours  spironolactone 25 milliGRAM(s) Oral daily    MEDICATIONS  (PRN):  acetaminophen   Tablet. 650 milliGRAM(s) Oral every 6 hours PRN Moderate Pain (4 - 6)      Allergies    No Known Allergies    Intolerances        SOCIAL HISTORY: No EtOH, no tobacco    REVIEW OF SYSTEMS:    CONSTITUTIONAL: No weakness, fevers or chills  EYES/ENT: No visual changes;  No vertigo or throat pain   NECK: No pain or stiffness  RESPIRATORY: No cough, wheezing, hemoptysis; No shortness of breath  CARDIOVASCULAR: No chest pain or palpitations  GASTROINTESTINAL: No abdominal or epigastric pain. No nausea, vomiting, or hematemesis; No diarrhea or constipation. No melena or hematochezia.  GENITOURINARY: No dysuria, frequency or hematuria  NEUROLOGICAL: No numbness or weakness  SKIN: No itching, burning, rashes, or lesions   All other review of systems is negative unless indicated above.        T(F): 98.2 (10-03-17 @ 10:07), Max: 98.2 (10-03-17 @ 10:07)  HR: 65 (10-03-17 @ 10:07)  BP: 118/83 (10-03-17 @ 10:07)  RR: 17 (10-03-17 @ 10:07)  SpO2: 99% (10-03-17 @ 10:07)  Wt(kg): --    GENERAL: NAD, well-developed  HEAD:  Atraumatic, Normocephalic  EYES: EOMI, PERRLA, conjunctiva and sclera clear  NECK: Supple, No JVD  CHEST/LUNG: Clear to auscultation bilaterally; No wheeze  HEART: Regular rate and rhythm; No murmurs, rubs, or gallops  ABDOMEN: Soft, Nontender, Nondistended; Bowel sounds present  EXTREMITIES:  2+ Peripheral Pulses, No clubbing, cyanosis, or edema  NEUROLOGY: non-focal  SKIN: No rashes or lesions                          10.3   11.5  )-----------( 261      ( 03 Oct 2017 04:19 )             31.7       10-03    138  |  102  |  21  ----------------------------<  114<H>  4.5   |  22  |  1.13    Ca    8.6      03 Oct 2017 04:21    TPro  7.0  /  Alb  3.1<L>  /  TBili  0.3  /  DBili  x   /  AST  27  /  ALT  25  /  AlkPhos  95  10-03      Lactate Dehydrogenase, Serum: 220 U/L (10-03 @ 04:21) Hematology Consult Note    HPI:  66yo man with chronic CHF s/p LVAD 6/12/17 and on warfarin has post procedure course has been complicated by multiple GI bleeds 2/2 angioectasias (7/25, 8/23). He was admitted on 9/25 for melena, excessive fatigue and weakness. Today he feels well overall. Denies weakness, HA, chest pain, blood in stool or easy bruising.     PAST MEDICAL & SURGICAL HISTORY:  GIB (gastrointestinal bleeding)  Ventricular fibrillation: s/p AICD  PAF (paroxysmal atrial fibrillation): on xarelto  Non-Ischemic Cardiomyopathy  SVT (Supraventricular Tachycardia)  HTN  CHF (Congestive Heart Failure)  LVAD (left ventricular assist device) present  Status post left hip replacement  History of Prior Ablation Treatment: for afib  AICD (Automatic Cardioverter/Defibrillator) Present: [employer] with 1 [employer] lead4/1/09- explanted and replaced with [employer] 2 leads on 9/2/09      FAMILY HISTORY:  No pertinent family history in first degree relatives      MEDICATIONS  (STANDING):  amiodarone    Tablet 200 milliGRAM(s) Oral daily  ascorbic acid 500 milliGRAM(s) Oral daily  carvedilol 12.5 milliGRAM(s) Oral every 12 hours  ferrous    sulfate 325 milliGRAM(s) Oral daily  folic acid 1 milliGRAM(s) Oral daily  furosemide    Tablet 20 milliGRAM(s) Oral every other day  hydrALAZINE 25 milliGRAM(s) Oral every 8 hours  hydrocortisone 1% Cream 1 Application(s) Topical two times a day  mexiletine 150 milliGRAM(s) Oral every 8 hours  octreotide  Infusion 25 MICROgram(s)/Hr (5 mL/Hr) IV Continuous <Continuous>  pantoprazole  Injectable 40 milliGRAM(s) IV Push every 12 hours  QUEtiapine 50 milliGRAM(s) Oral daily  sodium chloride 0.9% lock flush 3 milliLiter(s) IV Push every 8 hours  spironolactone 25 milliGRAM(s) Oral daily    MEDICATIONS  (PRN):  acetaminophen   Tablet. 650 milliGRAM(s) Oral every 6 hours PRN Moderate Pain (4 - 6)      Allergies    No Known Allergies    Intolerances        SOCIAL HISTORY: No EtOH, no tobacco    REVIEW OF SYSTEMS:    CONSTITUTIONAL: No weakness, fevers or chills  EYES/ENT: No visual changes;  No vertigo or throat pain   NECK: No pain or stiffness  RESPIRATORY: No cough, wheezing, hemoptysis; No shortness of breath  CARDIOVASCULAR: No chest pain or palpitations  GASTROINTESTINAL: No abdominal or epigastric pain. No nausea, vomiting, or hematemesis; No diarrhea or constipation. No melena or hematochezia.  GENITOURINARY: No dysuria, frequency or hematuria  NEUROLOGICAL: No numbness or weakness  SKIN: No itching, burning, rashes, or lesions   All other review of systems is negative unless indicated above.        T(F): 98.2 (10-03-17 @ 10:07), Max: 98.2 (10-03-17 @ 10:07)  HR: 65 (10-03-17 @ 10:07)  BP: 118/83 (10-03-17 @ 10:07)  RR: 17 (10-03-17 @ 10:07)  SpO2: 99% (10-03-17 @ 10:07)  Wt(kg): --    GENERAL: NAD, well-developed  HEAD:  Atraumatic, Normocephalic  EYES: EOMI, PERRLA, conjunctiva and sclera clear  NECK: Supple, No JVD  CHEST/LUNG: Clear to auscultation bilaterally; No wheeze  HEART: Regular rate and rhythm; No murmurs, rubs, or gallops  ABDOMEN: Soft, Nontender, Nondistended; Bowel sounds present  EXTREMITIES:  2+ Peripheral Pulses, No clubbing, cyanosis, or edema  NEUROLOGY: non-focal  SKIN: No rashes or lesions                          10.3   11.5  )-----------( 261      ( 03 Oct 2017 04:19 )             31.7       10-03    138  |  102  |  21  ----------------------------<  114<H>  4.5   |  22  |  1.13    Ca    8.6      03 Oct 2017 04:21    TPro  7.0  /  Alb  3.1<L>  /  TBili  0.3  /  DBili  x   /  AST  27  /  ALT  25  /  AlkPhos  95  10-03      Lactate Dehydrogenase, Serum: 220 U/L (10-03 @ 04:21) Hematology Consult Note    HPI:  This is a 66yo man with chronic CHF s/p LVAD 6/12/17 and on warfarin who has had a post procedure course complicated by multiple GI bleeds 2/2 angioectasias (7/25, 8/23). He was admitted on 9/25 for melena, excessive fatigue and weakness. Today he feels well overall. Denies weakness, HA, chest pain, blood in stool or easy bruising. The patient endorses a good appetite and states he has been eating regularly even despite multiple hospitalizations for the LVAD and subsequent GI bleeds. No reported history of liver disease and he denies family history of clotting/bleeding disorders. Hematology was consulted for elevated INR/prolonged PT and concern for coagulopathy. Of note, last dose of warfarin was given 9/27 (dose: 1mg).    PAST MEDICAL & SURGICAL HISTORY:  GIB (gastrointestinal bleeding)  Ventricular fibrillation: s/p AICD  PAF (paroxysmal atrial fibrillation): on xarelto  Non-Ischemic Cardiomyopathy  SVT (Supraventricular Tachycardia)  HTN  CHF (Congestive Heart Failure)  LVAD (left ventricular assist device) present  Status post left hip replacement  History of Prior Ablation Treatment: for afib  AICD (Automatic Cardioverter/Defibrillator) Present: [employer] with 1 [employer] lead4/1/09- explanted and replaced with [employer] 2 leads on 9/2/09      FAMILY HISTORY:  No pertinent family history in first degree relatives      MEDICATIONS  (STANDING):  amiodarone    Tablet 200 milliGRAM(s) Oral daily  ascorbic acid 500 milliGRAM(s) Oral daily  carvedilol 12.5 milliGRAM(s) Oral every 12 hours  ferrous    sulfate 325 milliGRAM(s) Oral daily  folic acid 1 milliGRAM(s) Oral daily  furosemide    Tablet 20 milliGRAM(s) Oral every other day  hydrALAZINE 25 milliGRAM(s) Oral every 8 hours  hydrocortisone 1% Cream 1 Application(s) Topical two times a day  mexiletine 150 milliGRAM(s) Oral every 8 hours  octreotide  Infusion 25 MICROgram(s)/Hr (5 mL/Hr) IV Continuous <Continuous>  pantoprazole  Injectable 40 milliGRAM(s) IV Push every 12 hours  QUEtiapine 50 milliGRAM(s) Oral daily  sodium chloride 0.9% lock flush 3 milliLiter(s) IV Push every 8 hours  spironolactone 25 milliGRAM(s) Oral daily    MEDICATIONS  (PRN):  acetaminophen   Tablet. 650 milliGRAM(s) Oral every 6 hours PRN Moderate Pain (4 - 6)      Allergies    No Known Allergies    Intolerances        SOCIAL HISTORY: No current EtOH or tobacco    REVIEW OF SYSTEMS:    CONSTITUTIONAL: No weakness, fevers or chills  EYES/ENT: No visual changes;  No vertigo or throat pain   NECK: No pain or stiffness  RESPIRATORY: No cough, wheezing, hemoptysis; No shortness of breath  CARDIOVASCULAR: No chest pain or palpitations  GASTROINTESTINAL: No abdominal or epigastric pain. No nausea, vomiting, or hematemesis; No additional episodes of melena.  GENITOURINARY: No dysuria, frequency or hematuria  NEUROLOGICAL: No numbness or weakness  SKIN: No itching, burning, rashes, or lesions   All other review of systems is negative unless indicated above.        T(F): 98.2 (10-03-17 @ 10:07), Max: 98.2 (10-03-17 @ 10:07)  HR: 65 (10-03-17 @ 10:07)  BP: 118/83 (10-03-17 @ 10:07)  RR: 17 (10-03-17 @ 10:07)  SpO2: 99% (10-03-17 @ 10:07)  Wt(kg): --    GENERAL: NAD, well-developed, sitting comfortably in the chair  HEAD:  Atraumatic, Normocephalic  EYES: EOMI, PERRLA, conjunctiva and sclera clear  NECK: Supple, No LAD  CHEST/LUNG: Clear to auscultation bilaterally, no wheeze  HEART: Regular rate and rhythm + LVAD equipment  ABDOMEN: Soft, Nontender, Nondistended  EXTREMITIES:  2+ Peripheral Pulses  NEUROLOGY: non-focal  SKIN: No rashes or lesions                          10.3   11.5  )-----------( 261      ( 03 Oct 2017 04:19 )             31.7     INR 1.37 (2.31 on admission)  PT 15  PTT 29.3    10-03    138  |  102  |  21  ----------------------------<  114<H>  4.5   |  22  |  1.13    Ca    8.6      03 Oct 2017 04:21    TPro  7.0  /  Alb  3.1<L>  /  TBili  0.3  /  DBili  x   /  AST  27  /  ALT  25  /  AlkPhos  95  10-03    EGD, 9/27: Normal esophagus.                       - Z-line regular, 37 cm from the incisors.                       - Normal stomach.                       - Small 1 cm subepithelial nodule found in the duodenum.                       - Normal examined jejunum to the proximal-mid jejunum.                       - No specimens collected

## 2017-10-03 NOTE — PROGRESS NOTE ADULT - PROBLEM SELECTOR PLAN 3
ro dislocation of right thumb  orthopedic consult called
c/w colchicine

## 2017-10-03 NOTE — PROGRESS NOTE ADULT - PROBLEM SELECTOR PLAN 1
followed by GI- Dr CORTEZ  HCT 29  capsule study today
PPI  Octreotride gtt  No coumadin x 3 mos.
PPI  Octreotride gtt  Continue to hold coumadin
PPI  Octreotride gtt  No coumadin x 3 mos.
PPI  Octreotride gtt  No coumadin x 3 mos.
ppi  octreotide gtt

## 2017-10-03 NOTE — CONSULT NOTE ADULT - PROBLEM SELECTOR RECOMMENDATION 9
INR 1.37 today which has steadily been downtrending since admission. He was on warfarin consistently up until admission and received 1mg warfarin on 9/27. No reversal agents were given on review of hospital course. PTT within normal limits  -differential: warfarin induced, vitamin K deficiency, factor deficiency/inhibitor.  -last dose on 9/27 and can have an effect up to 5 days (10/2). Even with this 1mg dose his INR is steadily decreasing.  -will order a mixing study to evaluate coagulation factors. INR 1.37 today which has steadily been downtrending since admission. He was on warfarin consistently up until admission and received 1mg warfarin on 9/27. No reversal agents were given on review of hospital course. PTT within normal limits  -differential: vitamin K deficiency >warfarin induced,  factor inhibitor.  -likely Vit K deficient given multiple hospitalizations/ GI bleeds. Can give 1 dose of Vitamin K PO 5mg  -last dose of warfarin on 9/27 and can have an effect up to 5 days (10/2). May play some role in elevated INR though Vit K deficiency likely a bigger factor.  -mixing study correct PT prolongation.

## 2017-10-04 ENCOUNTER — TRANSCRIPTION ENCOUNTER (OUTPATIENT)
Age: 67
End: 2017-10-04

## 2017-10-04 VITALS — WEIGHT: 153.44 LBS

## 2017-10-04 LAB
ANION GAP SERPL CALC-SCNC: 16 MMOL/L — SIGNIFICANT CHANGE UP (ref 5–17)
APTT BLD: 29 SEC — SIGNIFICANT CHANGE UP (ref 27.5–37.4)
BUN SERPL-MCNC: 20 MG/DL — SIGNIFICANT CHANGE UP (ref 7–23)
CALCIUM SERPL-MCNC: 8.8 MG/DL — SIGNIFICANT CHANGE UP (ref 8.4–10.5)
CHLORIDE SERPL-SCNC: 100 MMOL/L — SIGNIFICANT CHANGE UP (ref 96–108)
CO2 SERPL-SCNC: 20 MMOL/L — LOW (ref 22–31)
CREAT SERPL-MCNC: 1.09 MG/DL — SIGNIFICANT CHANGE UP (ref 0.5–1.3)
GLUCOSE SERPL-MCNC: 108 MG/DL — HIGH (ref 70–99)
HCT VFR BLD CALC: 32.7 % — LOW (ref 39–50)
HGB BLD-MCNC: 10.9 G/DL — LOW (ref 13–17)
INR BLD: 1.24 RATIO — HIGH (ref 0.88–1.16)
LDH SERPL L TO P-CCNC: 237 U/L — SIGNIFICANT CHANGE UP (ref 50–242)
MCHC RBC-ENTMCNC: 29.7 PG — SIGNIFICANT CHANGE UP (ref 27–34)
MCHC RBC-ENTMCNC: 33.4 GM/DL — SIGNIFICANT CHANGE UP (ref 32–36)
MCV RBC AUTO: 89.1 FL — SIGNIFICANT CHANGE UP (ref 80–100)
PLATELET # BLD AUTO: 290 K/UL — SIGNIFICANT CHANGE UP (ref 150–400)
POTASSIUM SERPL-MCNC: 4.5 MMOL/L — SIGNIFICANT CHANGE UP (ref 3.5–5.3)
POTASSIUM SERPL-SCNC: 4.5 MMOL/L — SIGNIFICANT CHANGE UP (ref 3.5–5.3)
PROTHROM AB SERPL-ACNC: 13.5 SEC — HIGH (ref 9.8–12.7)
RBC # BLD: 3.67 M/UL — LOW (ref 4.2–5.8)
RBC # FLD: 17.3 % — HIGH (ref 10.3–14.5)
SODIUM SERPL-SCNC: 136 MMOL/L — SIGNIFICANT CHANGE UP (ref 135–145)
WBC # BLD: 10.2 K/UL — SIGNIFICANT CHANGE UP (ref 3.8–10.5)
WBC # FLD AUTO: 10.2 K/UL — SIGNIFICANT CHANGE UP (ref 3.8–10.5)

## 2017-10-04 PROCEDURE — 99232 SBSQ HOSP IP/OBS MODERATE 35: CPT | Mod: GC

## 2017-10-04 RX ORDER — FUROSEMIDE 40 MG
1 TABLET ORAL
Qty: 0 | Refills: 0 | COMMUNITY
Start: 2017-10-04

## 2017-10-04 RX ORDER — CARVEDILOL PHOSPHATE 80 MG/1
1 CAPSULE, EXTENDED RELEASE ORAL
Qty: 0 | Refills: 0 | COMMUNITY
Start: 2017-10-04

## 2017-10-04 RX ORDER — SPIRONOLACTONE 25 MG/1
1 TABLET, FILM COATED ORAL
Qty: 0 | Refills: 0 | COMMUNITY
Start: 2017-10-04

## 2017-10-04 RX ORDER — MEXILETINE HYDROCHLORIDE 150 MG/1
1 CAPSULE ORAL
Qty: 0 | Refills: 0 | COMMUNITY
Start: 2017-10-04

## 2017-10-04 RX ORDER — ASCORBIC ACID 60 MG
1 TABLET,CHEWABLE ORAL
Qty: 0 | Refills: 0 | COMMUNITY
Start: 2017-10-04

## 2017-10-04 RX ORDER — FOLIC ACID 0.8 MG
1 TABLET ORAL
Qty: 0 | Refills: 0 | COMMUNITY
Start: 2017-10-04

## 2017-10-04 RX ORDER — QUETIAPINE FUMARATE 200 MG/1
1 TABLET, FILM COATED ORAL
Qty: 0 | Refills: 0 | COMMUNITY
Start: 2017-10-04

## 2017-10-04 RX ORDER — AMIODARONE HYDROCHLORIDE 400 MG/1
1 TABLET ORAL
Qty: 0 | Refills: 0 | COMMUNITY
Start: 2017-10-04

## 2017-10-04 RX ORDER — ACETAMINOPHEN 500 MG
2 TABLET ORAL
Qty: 0 | Refills: 0 | COMMUNITY
Start: 2017-10-04

## 2017-10-04 RX ORDER — HYDRALAZINE HCL 50 MG
1 TABLET ORAL
Qty: 0 | Refills: 0 | COMMUNITY
Start: 2017-10-04

## 2017-10-04 RX ORDER — OCTREOTIDE ACETATE 200 UG/ML
100 INJECTION, SOLUTION INTRAVENOUS; SUBCUTANEOUS
Qty: 0 | Refills: 0 | COMMUNITY
Start: 2017-10-04

## 2017-10-04 RX ORDER — OCTREOTIDE ACETATE 200 UG/ML
100 INJECTION, SOLUTION INTRAVENOUS; SUBCUTANEOUS THREE TIMES A DAY
Qty: 0 | Refills: 0 | Status: DISCONTINUED | OUTPATIENT
Start: 2017-10-04 | End: 2017-10-04

## 2017-10-04 RX ORDER — HYDROCORTISONE 1 %
1 OINTMENT (GRAM) TOPICAL
Qty: 0 | Refills: 0 | COMMUNITY
Start: 2017-10-04

## 2017-10-04 RX ADMIN — MEXILETINE HYDROCHLORIDE 150 MILLIGRAM(S): 150 CAPSULE ORAL at 05:43

## 2017-10-04 RX ADMIN — Medication 325 MILLIGRAM(S): at 11:34

## 2017-10-04 RX ADMIN — Medication 25 MILLIGRAM(S): at 05:44

## 2017-10-04 RX ADMIN — SODIUM CHLORIDE 3 MILLILITER(S): 9 INJECTION INTRAMUSCULAR; INTRAVENOUS; SUBCUTANEOUS at 13:49

## 2017-10-04 RX ADMIN — SODIUM CHLORIDE 3 MILLILITER(S): 9 INJECTION INTRAMUSCULAR; INTRAVENOUS; SUBCUTANEOUS at 05:46

## 2017-10-04 RX ADMIN — SPIRONOLACTONE 25 MILLIGRAM(S): 25 TABLET, FILM COATED ORAL at 05:43

## 2017-10-04 RX ADMIN — Medication 500 MILLIGRAM(S): at 11:35

## 2017-10-04 RX ADMIN — CARVEDILOL PHOSPHATE 12.5 MILLIGRAM(S): 80 CAPSULE, EXTENDED RELEASE ORAL at 17:33

## 2017-10-04 RX ADMIN — Medication 1 MILLIGRAM(S): at 11:35

## 2017-10-04 RX ADMIN — Medication 25 MILLIGRAM(S): at 13:47

## 2017-10-04 RX ADMIN — CARVEDILOL PHOSPHATE 12.5 MILLIGRAM(S): 80 CAPSULE, EXTENDED RELEASE ORAL at 05:43

## 2017-10-04 RX ADMIN — PANTOPRAZOLE SODIUM 40 MILLIGRAM(S): 20 TABLET, DELAYED RELEASE ORAL at 05:43

## 2017-10-04 RX ADMIN — Medication 1 APPLICATION(S): at 17:33

## 2017-10-04 RX ADMIN — MEXILETINE HYDROCHLORIDE 150 MILLIGRAM(S): 150 CAPSULE ORAL at 13:47

## 2017-10-04 RX ADMIN — Medication 1 APPLICATION(S): at 05:44

## 2017-10-04 RX ADMIN — AMIODARONE HYDROCHLORIDE 200 MILLIGRAM(S): 400 TABLET ORAL at 05:43

## 2017-10-04 RX ADMIN — PANTOPRAZOLE SODIUM 40 MILLIGRAM(S): 20 TABLET, DELAYED RELEASE ORAL at 17:33

## 2017-10-04 NOTE — PROGRESS NOTE ADULT - SUBJECTIVE AND OBJECTIVE BOX
24H hour events:   no events  normal bowel movements    tele: sinus 70s -100s    MEDICATIONS:  amiodarone    Tablet 200 milliGRAM(s) Oral daily  carvedilol 12.5 milliGRAM(s) Oral every 12 hours  furosemide    Tablet 20 milliGRAM(s) Oral every other day  hydrALAZINE 25 milliGRAM(s) Oral every 8 hours  mexiletine 150 milliGRAM(s) Oral every 8 hours  spironolactone 25 milliGRAM(s) Oral daily        acetaminophen   Tablet. 650 milliGRAM(s) Oral every 6 hours PRN  QUEtiapine 50 milliGRAM(s) Oral daily    pantoprazole  Injectable 40 milliGRAM(s) IV Push every 12 hours    octreotide  Infusion 25 MICROgram(s)/Hr IV Continuous <Continuous>    ascorbic acid 500 milliGRAM(s) Oral daily  ferrous    sulfate 325 milliGRAM(s) Oral daily  folic acid 1 milliGRAM(s) Oral daily  hydrocortisone 1% Cream 1 Application(s) Topical two times a day      REVIEW OF SYSTEMS:  General: no fatigue/malaise, weight loss/gain.  Skin: no rashes.  Ophthalmologic: no blurred vision, no loss of vision. 	  ENT: no sore throat, rhinorrhea, sinus congestion.  Respiratory: no SOB, cough or wheeze.  Gastrointestinal:  no N/V/D, no melena/hematemesis/hematochezia.  Genitourinary: no dysuria/hesitancy or hematuria.  Musculoskeletal: no myalgias or arthralgias.  Neurological: no changes in vision or hearing, no lightheadedness/dizziness, no syncope/near syncope	  Psychiatric: no unusual stress/anxiety.   Hematology/Lymphatics: no unusual bleeding, bruising and no lymphadenopathy.  Endocrine: no unusual thirst.   All others negative except as stated above and in HPI.    PHYSICAL EXAM:  T(C): 36.9 (10-04-17 @ 11:00), Max: 36.9 (10-04-17 @ 11:00)  HR: 77 (10-04-17 @ 11:00) (75 - 83)  BP: 107/80 (10-04-17 @ 11:00) (97/63 - 110/88)  RR: 18 (10-04-17 @ 11:00) (17 - 18)  SpO2: 97% (10-04-17 @ 11:00) (96% - 100%)  Wt(kg): --  I&O's Summary    03 Oct 2017 07:01  -  04 Oct 2017 07:00  --------------------------------------------------------  IN: 840 mL / OUT: 3025 mL / NET: -2185 mL    04 Oct 2017 07:01  -  04 Oct 2017 12:25  --------------------------------------------------------  IN: 480 mL / OUT: 275 mL / NET: 205 mL        Appearance: Normal	  HEENT:   Normal oral mucosa, PERRL, EOMI	  Lymphatic: No lymphadenopathy  Cardiovascular: Normal S1 S2, No JVD, No murmurs, No edema  Respiratory: Lungs clear to auscultation	  Psychiatry: A & O x 3, Mood & affect appropriate  Gastrointestinal:  Soft, Non-tender, + BS	  Skin: No rashes, No ecchymoses, No cyanosis	  Neurologic: Non-focal  Extremities: Normal range of motion, No clubbing, cyanosis or edema  Vascular: Peripheral pulses palpable 2+ bilaterally        LABS:	 	    CBC Full  -  ( 04 Oct 2017 04:35 )  WBC Count : 10.2 K/uL  Hemoglobin : 10.9 g/dL  Hematocrit : 32.7 %  Platelet Count - Automated : 290 K/uL  Mean Cell Volume : 89.1 fl  Mean Cell Hemoglobin : 29.7 pg  Mean Cell Hemoglobin Concentration : 33.4 gm/dL  Auto Neutrophil # : x  Auto Lymphocyte # : x  Auto Monocyte # : x  Auto Eosinophil # : x  Auto Basophil # : x  Auto Neutrophil % : x  Auto Lymphocyte % : x  Auto Monocyte % : x  Auto Eosinophil % : x  Auto Basophil % : x    10-04    136  |  100  |  20  ----------------------------<  108<H>  4.5   |  20<L>  |  1.09  10-03    138  |  102  |  21  ----------------------------<  114<H>  4.5   |  22  |  1.13    Ca    8.8      04 Oct 2017 04:35  Ca    8.6      03 Oct 2017 04:21    TPro  7.0  /  Alb  3.1<L>  /  TBili  0.3  /  DBili  x   /  AST  27  /  ALT  25  /  AlkPhos  95  10-03    < from: Transthoracic Echocardiogram (10.02.17 @ 15:31) >  imensions:    Normal Values:  LA:     4.9    2.0 - 4.0 cm  Ao:     3.3    2.0 - 3.8 cm  SEPTUM: 0.8    0.6 - 1.2 cm  PWT:    1.0    0.6 - 1.1 cm  LVIDd:  5.5    3.0 - 5.6 cm  LVIDs:  4.8    1.8 - 4.0 cm  Derived variables:  LVMI: 102 g/m2  RWT: 0.36  Fractional short: 13 %  EF (Teicholtz): 27 %  ------------------------------------------------------------------------  Observations:  Mitral Valve: Tethered mitral valve leaflets with normal  opening. Mild-moderate mitral regurgitation.  Aortic Valve/Aorta: Trileaflet aortic valve. The aortic  valve appears to open every beat.  Mild aortic  regurgitation.  Aortic Root: 3.3 cm.  Left Atrium: Moderately dilated left atrium.  LA volume  index = 46 cc/m2.  Left Ventricle: Severe global left ventricular systolic  dysfunction. The LVAD cannula is seen in the LV apex.  Laminar color Doppler flow is seen in the cannula with  velocities of less than 1 m/sec. The septum appears to be  midline. Left ventricular enlargement.  Right Heart: Mild right atrial enlargement. Right  ventricular enlargement with decreased right ventricular  systolic function. A device wire is noted in the right  heart. Normal tricuspid valve. Mild tricuspid  regurgitation. Normal pulmonic valve.  Pericardium/Pleura: Normal pericardium with no pericardial  effusion.  Hemodynamic: Estimated right atrial pressure is 8mm Hg.  Estimated right ventricular systolic pressure equals 27 mm  Hg, assuming right atrial pressure equals 8 mm Hg,  consistent with normal pulmonary pressures.    < end of copied text >

## 2017-10-04 NOTE — DISCHARGE NOTE ADULT - PATIENT PORTAL LINK FT
“You can access the [facility] Patient Portal, offered by [facility], by registering with the following website: [email or web address]”

## 2017-10-04 NOTE — DISCHARGE NOTE ADULT - NS AS ACTIVITY OBS
Walking-Indoors allowed/Walking-Outdoors allowed/No Heavy lifting/straining/Do not drive or operate machinery

## 2017-10-04 NOTE — DISCHARGE NOTE ADULT - MEDICATION SUMMARY - MEDICATIONS TO TAKE
I will START or STAY ON the medications listed below when I get home from the hospital:    spironolactone 25 mg oral tablet  -- 1 tab(s) by mouth once a day  -- Indication: For diuretic    acetaminophen 325 mg oral tablet  -- 2 tab(s) by mouth every 6 hours, As needed, Moderate Pain (4 - 6)  -- Indication: For pain    amiodarone 200 mg oral tablet  -- 1 tab(s) by mouth once a day  -- Indication: For Antiarrythmic    mexiletine 150 mg oral capsule  -- 1 cap(s) by mouth every 8 hours  -- Indication: For Antiarrythmic    QUEtiapine 50 mg oral tablet  -- 1 tab(s) by mouth once a day  -- Indication: For Antipsychotic    carvedilol 12.5 mg oral tablet  -- 1 tab(s) by mouth every 12 hours  -- Indication: For Antiarrythmic    hydrocortisone 1% topical cream  -- 1 application on skin 2 times a day  -- Indication: For ointment    furosemide 20 mg oral tablet  -- 1 tab(s) by mouth every other day  -- Indication: For diuretic    ferrous sulfate 325 mg oral tablet  -- 1 tab(s) by mouth once a day  -- Check with your doctor before becoming pregnant.  Do not chew, break, or crush.  May discolor urine or feces.    -- Indication: For supplement    pantoprazole 40 mg oral delayed release tablet  -- 1 tab(s) intravenous once a day  -- Indication: For Gastrointestinal    octreotide  -- 100 microgram(s) subcutaneous 3 times a day  -- Indication: For GI bleed    hydrALAZINE 25 mg oral tablet  -- 1 tab(s) by mouth every 8 hours  -- Indication: For Blood pressure/afterload    ascorbic acid 500 mg oral tablet  -- 1 tab(s) by mouth once a day  -- Indication: For supplement    folic acid 1 mg oral tablet  -- 1 tab(s) by mouth once a day  -- Indication: For supplement

## 2017-10-04 NOTE — PROGRESS NOTE ADULT - PROVIDER SPECIALTY LIST ADULT
CT Surgery
Critical Care
Gastroenterology
Heart Failure
Psychiatry
Psychiatry
Heart Failure
Heart Failure
CT Surgery
CT Surgery

## 2017-10-04 NOTE — DISCHARGE NOTE ADULT - CARE PLAN
Principal Discharge DX:	LVAD (left ventricular assist device) present  Goal:	Recovery  Instructions for follow-up, activity and diet:	Regular no added salt diet  Shower daily  Daily weights, call for a gain > 3 lbs  Secondary Diagnosis:	AICD (automatic cardioverter/defibrillator) present

## 2017-10-04 NOTE — PROGRESS NOTE ADULT - SUBJECTIVE AND OBJECTIVE BOX
ADVANCED ENDOSCOPY PROGRESS NOTE:     Chief Complaint:  Patient is a 67y old  Male who presents with a chief complaint of melena, Anemia. (25 Sep 2017 22:44)    Interval Events:  - s/p Push enteroscopy last week that was negative for any source of bleeding; capsule endoscopy shows active bleeding in proximal small bowel (4min into small bowel)  - started on Octreotide Friday   - last episode of melena was over weekend  - no bowel movements yesterday or today    - denies lightheadedness or dizziness  - hand pain is improved      Allergies:  No Known Allergies      Home Medications: reviewed     Hospital Medications:  pantoprazole  Injectable 40 milliGRAM(s) IV Push every 12 hours  amiodarone    Tablet 200 milliGRAM(s) Oral daily  carvedilol 6.25 milliGRAM(s) Oral every 12 hours  ferrous    sulfate 325 milliGRAM(s) Oral daily  folic acid 1 milliGRAM(s) Oral daily  furosemide    Tablet 20 milliGRAM(s) Oral every other day  mexiletine 150 milliGRAM(s) Oral every 8 hours  QUEtiapine 50 milliGRAM(s) Oral daily  acetaminophen   Tablet 650 milliGRAM(s) Oral every 8 hours PRN  ascorbic acid 500 milliGRAM(s) Oral daily  sodium chloride 0.9%. 1000 milliLiter(s) IV Continuous <Continuous>      PMHX/PSHX:  GIB (gastrointestinal bleeding)  H/O prior ablation treatment  Ventricular fibrillation  PAF (paroxysmal atrial fibrillation)  Non-Ischemic Cardiomyopathy  SVT (Supraventricular Tachycardia)  HTN  CHF (Congestive Heart Failure)  LVAD (left ventricular assist device) present  Status post left hip replacement  Hypertension  Hypertension  History of Prior Ablation Treatment  AICD (Automatic Cardioverter/Defibrillator) Present      Family history:  No pertinent family history in first degree relatives      Social History: no current tobacco, ETOH, or IVDA     ROS:     General:  No wt loss, fevers, chills, night sweats, fatigue,   Eyes:  Good vision, no reported pain  ENT:  No sore throat, pain, runny nose, dysphagia  CV:  No pain, palpitations, hypo/hypertension  Resp:  No dyspnea, cough, tachypnea, wheezing  GI:  No pain, No nausea, No vomiting, No diarrhea, No constipation, No weight loss, No fever, No pruritis, No rectal bleeding, + tarry stools, No dysphagia,  :  No pain, bleeding, incontinence, nocturia  Muscle:  +right hand pain   Neuro:  No weakness, tingling, memory problems  Psych:  No fatigue, insomnia, mood problems, depression  Endocrine:  No polyuria, polydipsia, cold/heat intolerance  Heme:  No petechiae, ecchymosis, easy bruisability  Skin:  No rash, tattoos, scars, edema      PHYSICAL EXAM:     GENERAL:  Appears stated age, well-groomed, well-nourished, no distress  HEENT:  NC/AT,  conjunctivae clear and pink, no thyromegaly, nodules, adenopathy, no JVD, sclera -anicteric  CHEST:  Full & symmetric excursion, no increased effort, breath sounds clear  HEART:  +LVAD   ABDOMEN:  Soft, non-tender, non-distended, normoactive bowel sounds,  no masses ,no hepato-splenomegaly, no signs of chronic liver disease  EXTEREMITIES:  no cyanosis,clubbing or edema; right hand with tenderness to palpation   SKIN:  No rash/erythema/ecchymoses/petechiae/wounds/abscess/warm/dry  NEURO:  Alert, oriented x 3, no asterixis, no tremor, no encephalopathy    Vital Signs:  Vital Signs Last 24 Hrs  T(C): 36.6 (04 Oct 2017 05:46), Max: 36.8 (03 Oct 2017 10:07)  T(F): 97.9 (04 Oct 2017 05:46), Max: 98.3 (03 Oct 2017 18:35)  HR: 75 (04 Oct 2017 05:46) (65 - 83)  BP: 108/80 (04 Oct 2017 05:46) (97/63 - 118/83)  BP(mean): 89 (04 Oct 2017 05:46) (75 - 89)  RR: 18 (04 Oct 2017 05:46) (17 - 18)  SpO2: 100% (04 Oct 2017 05:46) (96% - 100%)  Daily     Daily     LABS:                        10.9   10.2  )-----------( 290      ( 04 Oct 2017 04:35 )             32.7     10-04    136  |  100  |  20  ----------------------------<  108<H>  4.5   |  20<L>  |  1.09    Ca    8.8      04 Oct 2017 04:35    TPro  7.0  /  Alb  3.1<L>  /  TBili  0.3  /  DBili  x   /  AST  27  /  ALT  25  /  AlkPhos  95  10-03    LIVER FUNCTIONS - ( 03 Oct 2017 04:21 )  Alb: 3.1 g/dL / Pro: 7.0 g/dL / ALK PHOS: 95 U/L / ALT: 25 U/L RC / AST: 27 U/L / GGT: x           PT/INR - ( 04 Oct 2017 04:35 )   PT: 13.5 sec;   INR: 1.24 ratio         PTT - ( 04 Oct 2017 04:35 )  PTT:29.0 sec              Imaging:    < from: Enteroscopy (08.23.17 @ 12:06) >  [facility]  ____________________________________________________________________________________________________  Patient Name: Yoseph Baez                       MRN: [id number]  Account Number: [id number]                     YOB: 1950  Room: Endoscopy Room 2                           Gender: Male  Attending MD: Quentin James MD                    Procedure Date No Time: 8/23/2017  ____________________________________________________________________________________________________     Procedure:           Upper endoscopy/Push Enteroscopy  Indications:         Gastrointestinal bleeding (melena), Blood loss anemia, Small bowel capsule                        showing angioectasias in proximal small bowel, LVAD in place.  Providers:           Quentin James MD, Christos Pickett MD (Fellow)  Referring MD:        Ayaz Barr  Medicines:           Monitored Anesthesia Care  Complications:       No immediate complications.  ____________________________________________________________________________________________________  Procedure:           Pre-Anesthesia Assessment:                       - Prior to the procedure, a History and Physical was performed, and patient                        medications, allergies and sensitivities were reviewed. The patient's                        tolerance of previous anesthesia was reviewed.                       - The risks and benefits of the procedure and the sedation options and risks                        were discussed with the patient. All questions were answered and informed                        consent was obtained.                       - Patient identification and proposed procedure were verified prior to the                        procedure by the physician. The procedure was verified in the endoscopy suite.                       After obtaining informed consent, the endoscope was passed under direct                        vision. Throughout the procedure, the patient's blood pressure, pulse, and                        oxygen saturations were monitored continuously. The pediatric colonoscope was                        introduced through the mouth and advanced to the proximal/mid jejunum. The                        small bowel enteroscopy wasaccomplished without difficulty. The patient                        tolerated the procedure well.                                                                                                        Findings:       The examined esophagus was normal.       A few, non-bleeding erosions were found in the stomach.       Three nonbleeding angioectasias measuring up to 3 mm were found in the proximal-mid jejunum.        Coagulation for hemostasis using argon plasma at 0.5 liters/minute and 20 raymond was        successful.                                                                                                        Impression:          - Non-bleeding erosive gastropathy.                       - Three non-bleeding angiodysplastic lesions inthe jejunum. Treated with                        argon plasma coagulation (APC).  Recommendation:      - [facility] patient to [facility] for ongoing care.                       - Clear liquid diet, advance tomorrow if feeling well.     - Monitor H/H, transfuse accordingly    < end of copied text >      < from: Enteroscopy (07.25.17 @ 12:08) >  [facility]  ____________________________________________________________________________________________________  Patient Name: Yoseph Baez                       MRN: [id number]  Account Number: [id number]                     YOB: 1950  Room: Endoscopy Room 1                           Gender: Male  Attending MD: DOMINIK PASCUAL MD                 Procedure Date No Time: 7/25/2017  ____________________________________________________________________________________________________     Procedure:           Small bowel enteroscopy  Indications:         Melena  Providers:           DOMINIK PASCUAL MD, Chrsitos Pickett MD (Fellow)  Referring MD:        Ayaz Barr  Medicines:           Monitored Anesthesia Care  Complications:       No immediate complications.  ____________________________________________________________________________________________________  Procedure:           Pre-Anesthesia Assessment:                       - Prior to the procedure, a History and Physical was performed, and patient                        medications, allergies and sensitivities were reviewed. The patient's                        tolerance of previous anesthesia was reviewed.                       - The risks and benefits of the procedure and the sedation options and risks                        were discussed with the patient. All questions were answered and informed                        consent was obtained.                       - Patient identificationand proposed procedure were verified prior to the   Surgical Pathology Report (04.13.17 @ 13:00)    Surgical Pathology Report:   ACCESSION No:  [id number]    YOSEPH BAEZ 2        Surgical Final Report          Final Diagnosis    1. Stomach, antrum, biopsy:  - Gastric antral mucosa with intestinal metaplasia and  chronic gastritis.  - Negative for dysplasia.  - Negative for Helicobacter pylori (special stain).    2. Gastric ulcer, biopsy:  - Gastric antral-oxyntic mucosa with intestinal metaplasia  in background of  ulceration and chronic active gastritis.  - Negative for dysplasia (additional levels x3 examined).  - Negative for Helicobacter pylori (special stain and  immunohistochemistry).    Slide(s) with built in immunohistochemical study control(s)  associated and negative control with this case has/have been  verified by the sign out pathologist.  These immunohistochemical/ in-situ hybridization tests have been  developed and their performance characteristics determined by  [facility], Department of  Pathology, Division of Immunopathology, [address] [address] [place] [address].  It has not been cleared or approved by the  U.S. Food and Drug Administration.  The FDA has determined that  such clearance or approval is not necessary.  This test is used  for clinical purposes.  The laboratory iscertified under the  CLIA-88 as qualified to perform high  complexity clinical testing.    Ariel Johnson M.D.  (Electronic Signature)  Reported on: 04/18/17    Clinical History  66 years old with CAF, gastric ulcer.  EGD    Specimen(s) Submitted  1-Antrum biopsy  2- Gastric ulcer    Gross Description  04/13/17 17:55  1.   The specimen is received in formalin and the specimen  container is labeled: Antrum.  It consists of one fragments of  tan-pink, soft tissue, measuring 0.3 cm in greatest dimension.  Entirely submitted.  One cassette.            YOSEPH BAEZ                         2        Surgical Final Report            2.   The specimen is received in formalin and the specimen  container is labeled: Gastric ulcer.  It consists of three  fragments of tan-pink, soft tissue, ranging from 0.1-0.3  cm in  greatest dimension.  Entirely submitted.  One cassette.    In addition to other data that may appear on the specimen  containers, all labels have been inspected to confirm the  presenceof the patient's name and date of birth.    XW  Thu  April 13, 2017 05:56 PM                         procedure by the physician. The procedure was verified in the endoscopy suite.                       After obtaining informed consent, the endoscope was passed under direct                     vision. Throughout the procedure, the patient's blood pressure, pulse, and                        oxygen saturations were monitored continuously. The Colonoscope was                        introduced through the mouth and advanced to the proximal jejunum. The small                        bowel enteroscopy was accomplished without difficulty. The patient tolerated                        the procedure well.                              Findings:       The examined esophagus was normal.       The entire examined stomach was normal.       There was no evidence of significant pathology in the entire examined duodenum.       Two angioectasias with no bleeding were found in the proximal jejunum. Coagulation for        hemostasis using bipolar probe was successful.                                                                                                        Impression:          - Normal esophagus,stomach and duodenum.                       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 Treated with bipolar cautery.                       - No specimens collected.  Recommendation:      - Return patient to [facility] for ongoing care.                  - Plan for small bowel capsule study today.

## 2017-10-04 NOTE — PROGRESS NOTE ADULT - ASSESSMENT
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    #NICM sp HM II LVAD: given multiple admissions for AVMS, will need to discuss long term options. Patient interested in heart transplant, blood B+ but with multiple transfusions. Have discussed with [facility], to transfer for transplant  - Current settings:  flow 4.6L/M.  speed 9200 PI 6.9 power 5.2W  -continue home coreg 12.5 BID, lasix 20 po QOD,  cont renee 25 QD  -cont  hydralazine 25 q8h,     # UGIB likely 2/2 AVMs:  sp 2uprbc on admission 9/25/17 without any further transfusions as cbc has remained stable. sp egd which did nto reveal any ulcers of AVMS but pill capsule endoscopy showed bleeding in proximal small bowel (4min into small bowel) and started on octreotide.   - cont with octreotide per GI, appreciate recs  - cont to hold coumadin/anti paltelet therapy  - protonix 40mg ivp bid, ocrtreotide gtt    # prior VT with [facility] ICD: cont mexilitine 150mg tid, amio 200 qd,  coreg as above      # depression: cont quetiapine 50 qhs.     Claudia Francis MD  CHF  [phone] (After 5pm and on weekends, please call [phone])

## 2017-10-04 NOTE — DISCHARGE NOTE ADULT - OTHER SIGNIFICANT FINDINGS
Im ok today    VITAL SIGNS    Telemetry:  NSr   , 5 bts wct    Vital Signs Last 24 Hrs  T(C): 36.6 (10-04-17 @ 15:00), Max: 36.9 (10-04-17 @ 11:00)  T(F): 97.9 (10-04-17 @ 15:00), Max: 98.4 (10-04-17 @ 11:00)  HR: 85 (10-04-17 @ 15:00) (75 - 85)  BP: 113/83 (10-04-17 @ 15:00) (97/63 - 113/83)  RR: 18 (10-04-17 @ 15:00) (17 - 18)  SpO2: 96% (10-04-17 @ 15:00) (96% - 100%)                   10-03 @ 07:01  -  10-04 @ 07:00  --------------------------------------------------------  IN: 840 mL / OUT: 3025 mL / NET: -2185 mL    10-04 @ 07:01  -  10-04 @ 16:42  --------------------------------------------------------  IN: 480 mL / OUT: 625 mL / NET: -145 mL          Daily     Daily Weight in k.6 (04 Oct 2017 08:00)        CAPILLARY BLOOD GLUCOSE                      Coumadin    [ ] YES          [ x ]      NO         Reason:     On hold x3 months for multiple GI bleeds                          PHYSICAL EXAM        Neurology: alert and oriented x 3, nonfocal, no gross deficits  CV : S1 S2 , RRR   Sternal Wound :  CDI , Stable  Lungs: CTA  Abdomen: soft, nontender, nondistended, positive bowel sounds, driveline dressing cdi  :     voiding        Extremities:        -  edema, negative calve tenderness,   leg incision cdi         acetaminophen   Tablet. 650 milliGRAM(s) Oral every 6 hours PRN  amiodarone    Tablet 200 milliGRAM(s) Oral daily  ascorbic acid 500 milliGRAM(s) Oral daily  carvedilol 12.5 milliGRAM(s) Oral every 12 hours  ferrous    sulfate 325 milliGRAM(s) Oral daily  folic acid 1 milliGRAM(s) Oral daily  furosemide    Tablet 20 milliGRAM(s) Oral every other day  hydrALAZINE 25 milliGRAM(s) Oral every 8 hours  hydrocortisone 1% Cream 1 Application(s) Topical two times a day  mexiletine 150 milliGRAM(s) Oral every 8 hours  octreotide  Injectable 100 MICROGram(s) SubCutaneous three times a day  pantoprazole  Injectable 40 milliGRAM(s) IV Push every 12 hours  QUEtiapine 50 milliGRAM(s) Oral daily  sodium chloride 0.9% lock flush 3 milliLiter(s) IV Push every 8 hours  spironolactone 25 milliGRAM(s) Oral daily                      Discussed with Cardiothoracic Team at AM rounds.

## 2017-10-04 NOTE — PROGRESS NOTE ADULT - ASSESSMENT
Impression:  1)Acute blood loss anemia (melena)- upper GI source, differential includes small bowel angioectasia  2)Severe systolic CHF s/p LVAD on anticoagulation    Plan:  1)Monitor Hgb daily and bowel movements   2)d/c IV PPI and change to PPI PO daily  3)Would change to Octreotide 100mcg subcutaneous BID- consider outpatient IM injections monthly  3)A/C per CHF/CT ICU team  4)May consider repeat push enteroscopy vs. single balloon enteroscopy if bleeding continues or drop in Hgb, would just monitor for now.      Please call with questions  Marlin Borrego  GI Fellow  Pager: [phone]

## 2017-10-04 NOTE — DISCHARGE NOTE ADULT - CARE PROVIDERS DIRECT ADDRESSES
[email or web address],DirectAddress_Unknown

## 2017-10-04 NOTE — DISCHARGE NOTE ADULT - HOSPITAL COURSE
67M Hx LVAD 6/12/17 readmitted 9/25 with GIB... HCT 18.  Admitted to CTU transfused.  Hx of AV malformation.  His HCT stabilized.    Transferred to [address] [place] [address] 9/28. s/p Capsule study  revealing  GIB in small bowel.    9/28 C/O right wrist/thumb pain.  Xray showed arthritis ?subluxation of thumb pt with hx of gout started on colchicine  9/29 coreg increased to 12.5mg po bid, diurectics resumed ( aldactone 25mg daily and [id number] every other day_   + capsule study started on  sandostatin  (octreotide gtt) . AC  on hold   10/1 - INR =1.42- no coumadin x 3 mos. following 2nd GI bleed  d/c planning  10/2 syncope, witnessed, probable hypotension, MAP 84, however the pt normally has a /74  250IVF given.  Echo pending  Echo mod Mr, neg effusion  Remains on octreotride, GI following, changed to sq tid upon d/c  Transfer to [facility],  transplant evaluation 67M Hx LVAD 6/12/17 readmitted 9/25 with GIB... HCT 18.  Admitted to CTU transfused.  Hx of AV malformation.  His HCT stabilized.    Transferred to [address] [place] [address] 9/28. s/p Capsule study  revealing  GIB in small bowel.    9/28 C/O right wrist/thumb pain.  Xray showed arthritis ?subluxation of thumb pt with hx of gout started on colchicine  9/29 coreg increased to 12.5mg po bid, diurectics resumed ( aldactone 25mg daily and [id number] every other day_   + capsule study started on  sandostatin  (octreotide gtt) . AC  on hold   10/1 - INR =1.42- no coumadin x 3 mos. following 2nd GI bleed  d/c planning  10/2 syncope, witnessed, probable hypotension, MAP 84, however the pt normally has a /74  250IVF given.  Echo pending  Echo mod Mr, neg effusion  Remains on octreotride, GI following, changed to sq tid upon d/c  Heme consulted for coagulopathy w/u 2/2 eelevated INR off coumadin, blood work sent  Transfer to [facility],  transplant evaluation

## 2017-10-04 NOTE — DISCHARGE NOTE ADULT - MEDICATION SUMMARY - MEDICATIONS TO STOP TAKING
I will STOP taking the medications listed below when I get home from the hospital:    Coumadin 1 mg oral tablet  -- 1 tab(s) by mouth once a day  -- Do not take this drug if you are pregnant.  It is very important that you take or use this exactly as directed.  Do not skip doses or discontinue unless directed by your doctor.  Obtain medical advice before taking any non-prescription drugs as some may affect the action of this medication.    carvedilol 6.25 mg oral tablet  -- 1 tab(s) by mouth every 12 hours    spironolactone 25 mg oral tablet  -- 0.5 tab(s) by mouth once a day

## 2017-10-04 NOTE — DISCHARGE NOTE ADULT - ADDITIONAL INSTRUCTIONS
YOU WILL NEED TO FOLLOW UP WITH DR GONSALEZ    YOU WILL NEED TO FOLLOW UP WITH THE GI DOCTOR , DR MARGARITA CORTEZ

## 2017-10-04 NOTE — DISCHARGE NOTE ADULT - CARE PROVIDER_API CALL
Ayaz Barr), Surgery; Thoracic and Cardiac Surgery  [address] [place] [address]  Phone: [phone]  Fax: [phone]    Edil Stahl (MD; MPH), Adv Heart Fail Trnsplnt Cardio; Cardiology; Internal Medicine  [address] [place] [address]  Phone: [phone]  Fax: [phone]    Quentin James), Gastroenterology; Internal Medicine  Division of Gastroenterology  [address] [place] [address]  Phone: [phone]  Fax: [phone]    Neva Chand (NP; RN), NP in Adult Health  [address] [place] [address]  Phone: [phone]  Fax: [phone]

## 2017-10-04 NOTE — PROGRESS NOTE ADULT - ATTENDING COMMENTS
Hgb stable off anticoagulation.  MAPs remain in 90s.  Increase hydralazine to 50 bid.
As above.  No active GI bleeding.  Will follow clinically.  Change octreotide to longer acting formulations for outpatient use.
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 Has been off AC and H/H stable. On octreotide gtt. Patient feels well but had vagal episode today when going to bathroom. On exam, JVD approx 7 cm, LVAD hum, clear lungs, no pedal edema. Labs reviewed INR 1.42, prealbumin 10, Hb stable. Repeat TTE today showed aortic valve opening each beat, LVEDD 5.5 cm, EF 27%. Currently stage D HF, euvolemic with recurrent GIB. Patient interested in transplant.   - appreciate GI c/s  - will d/w Jorge regarding possible transplant  - continue holding ASA and coumadin; serial LDH  - continue lasix 20 mg every other day  - titrate BP to systolics 100-120 given pulsatility; on hyral 25 mg q8h and renee 25 mg daily, coreg 12.5 mg bid  - VT; no further episodes; continue mexilitine 150 mg q12  - AVMs; will have to look into octreotide as outpt
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 Has been off AC and H/H stable. On octreotide gtt. Patient feels well. On exam, JVD approx 7 cm, LVAD hum, clear lungs, no pedal edema. Labs reviewed INR 1.42, prealbumin 10, Hb stable. Repeat TTE showed aortic valve opening each beat, LVEDD 5.5 cm, EF 27%. Currently stage D HF, euvolemic with recurrent GIB. Patient interested in transplant - discussed case with Dr. Ayoub at [facility] who is ready to accept patient for transplant evaluation.   - appreciate GI c/s; agree with switching to octreotide 100 mcg subq q12  - continue holding ASA and coumadin; serial LDH  - continue lasix 20 mg every other day  - titrate BP to systolics 100-120 given pulsatility; on hyral 25 mg q8h and renee 25 mg daily, coreg 12.5 mg bid  - VT; no further episodes; continue mexilitine 150 mg q12  - plan for possible transfer tomorrow
Capsule endoscopy planned.  Ramp study today.
Evidence of intermittent bleeding in early small bowel. Consider sandostatin since this has been an ongoing issue and he is likely to have more and for more AVMs as time goes on. If this is not possible or does not help can consider another attempt at enteroscopy.
F/u enteroscopy results.  Will arrange for echo ramp study tomorrow.
MAPs 80s-90s on higher dose carvedilol.  Feels well, ambulating.  Hgb stable.  Continue current regimen, off anticoagulation.
Marga Martines, PhD  [phone]
Marga Martines, PhD  [phone]
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 Has been off AC and H/H stable. On octreotide gtt. Patient feels well. On exam, JVD approx 7 cm, LVAD hum, clear lungs, no pedal edema. Labs reviewed INR 1.42, prealbumin 10, Hb stable. Repeat TTE showed aortic valve opening each beat, LVEDD 5.5 cm, EF 27%. Currently stage D HF, euvolemic with recurrent GIB. Patient interested in transplant - discussed case with Dr. Ayoub at [facility] who is ready to accept patient for transplant evaluation. Patient accepted today.   - appreciate GI c/s; agree with switching to octreotide 100 mcg subq q12  - continue holding ASA and coumadin; serial LDH  - continue lasix 20 mg every other day  - titrate BP to systolics 100-120 given pulsatility; on hyral 25 mg q8h and renee 25 mg daily, coreg 12.5 mg bid  - VT; no further episodes; continue mexilitine 150 mg q12
Capsule endoscopy results noted.  BRB in small bowel.  Hgb stable.  Sandostatin to be started.  Continue to hold Coumadin until GI clears.  MAPs .  Increase Coreg to 12.5 bid.  Restart Lasix 20 qod and spironolactone 25 qd.
As above.  For significant rebleeding, would repeat push enteroscopy.

## 2017-10-05 ENCOUNTER — APPOINTMENT (OUTPATIENT)
Dept: CARDIOLOGY | Facility: CLINIC | Age: 67
End: 2017-10-05

## 2017-10-09 NOTE — PROGRESS NOTE ADULT - PROBLEM/PLAN-2
DISPLAY PLAN FREE TEXT
ancef/Followed protocol

## 2017-10-15 ENCOUNTER — INPATIENT (INPATIENT)
Facility: HOSPITAL | Age: 67
LOS: 3 days | Discharge: HOME | DRG: 553 | End: 2017-10-19
Attending: THORACIC SURGERY (CARDIOTHORACIC VASCULAR SURGERY) | Admitting: THORACIC SURGERY (CARDIOTHORACIC VASCULAR SURGERY)
Payer: MEDICARE

## 2017-10-15 VITALS
TEMPERATURE: 98 F | WEIGHT: 148.37 LBS | OXYGEN SATURATION: 98 % | RESPIRATION RATE: 16 BRPM | HEIGHT: 68 IN | HEART RATE: 85 BPM

## 2017-10-15 DIAGNOSIS — Z95.811 PRESENCE OF HEART ASSIST DEVICE: ICD-10-CM

## 2017-10-15 DIAGNOSIS — M17.11 UNILATERAL PRIMARY OSTEOARTHRITIS, RIGHT KNEE: ICD-10-CM

## 2017-10-15 DIAGNOSIS — I50.9 HEART FAILURE, UNSPECIFIED: ICD-10-CM

## 2017-10-15 DIAGNOSIS — Z95.811 PRESENCE OF HEART ASSIST DEVICE: Chronic | ICD-10-CM

## 2017-10-15 DIAGNOSIS — K92.2 GASTROINTESTINAL HEMORRHAGE, UNSPECIFIED: ICD-10-CM

## 2017-10-15 LAB
ALBUMIN SERPL ELPH-MCNC: 3.8 G/DL — SIGNIFICANT CHANGE UP (ref 3.3–5)
ALP SERPL-CCNC: 149 U/L — HIGH (ref 40–120)
ALT FLD-CCNC: 72 U/L RC — HIGH (ref 10–45)
ANION GAP SERPL CALC-SCNC: 16 MMOL/L — SIGNIFICANT CHANGE UP (ref 5–17)
APPEARANCE UR: CLEAR — SIGNIFICANT CHANGE UP
AST SERPL-CCNC: 83 U/L — HIGH (ref 10–40)
BILIRUB SERPL-MCNC: 0.7 MG/DL — SIGNIFICANT CHANGE UP (ref 0.2–1.2)
BILIRUB UR-MCNC: NEGATIVE — SIGNIFICANT CHANGE UP
BLD GP AB SCN SERPL QL: NEGATIVE — SIGNIFICANT CHANGE UP
BUN SERPL-MCNC: 32 MG/DL — HIGH (ref 7–23)
CALCIUM SERPL-MCNC: 9.7 MG/DL — SIGNIFICANT CHANGE UP (ref 8.4–10.5)
CHLORIDE SERPL-SCNC: 95 MMOL/L — LOW (ref 96–108)
CO2 SERPL-SCNC: 21 MMOL/L — LOW (ref 22–31)
COLOR SPEC: YELLOW — SIGNIFICANT CHANGE UP
CREAT SERPL-MCNC: 1.54 MG/DL — HIGH (ref 0.5–1.3)
DIFF PNL FLD: NEGATIVE — SIGNIFICANT CHANGE UP
GLUCOSE SERPL-MCNC: 94 MG/DL — SIGNIFICANT CHANGE UP (ref 70–99)
GLUCOSE UR QL: NEGATIVE MG/DL — SIGNIFICANT CHANGE UP
HBA1C BLD-MCNC: 5.2 % — SIGNIFICANT CHANGE UP (ref 4–5.6)
HCT VFR BLD CALC: 35.2 % — LOW (ref 39–50)
HGB BLD-MCNC: 11.3 G/DL — LOW (ref 13–17)
INR BLD: 1.19 RATIO — HIGH (ref 0.88–1.16)
KETONES UR-MCNC: NEGATIVE — SIGNIFICANT CHANGE UP
LDH SERPL L TO P-CCNC: 307 U/L — HIGH (ref 50–242)
LEUKOCYTE ESTERASE UR-ACNC: NEGATIVE — SIGNIFICANT CHANGE UP
MAGNESIUM SERPL-MCNC: 2 MG/DL — SIGNIFICANT CHANGE UP (ref 1.6–2.6)
MCHC RBC-ENTMCNC: 28.3 PG — SIGNIFICANT CHANGE UP (ref 27–34)
MCHC RBC-ENTMCNC: 32.1 GM/DL — SIGNIFICANT CHANGE UP (ref 32–36)
MCV RBC AUTO: 88.2 FL — SIGNIFICANT CHANGE UP (ref 80–100)
NITRITE UR-MCNC: NEGATIVE — SIGNIFICANT CHANGE UP
PH UR: 5.5 — SIGNIFICANT CHANGE UP (ref 5–8)
PLATELET # BLD AUTO: 385 K/UL — SIGNIFICANT CHANGE UP (ref 150–400)
POTASSIUM SERPL-MCNC: 4.2 MMOL/L — SIGNIFICANT CHANGE UP (ref 3.5–5.3)
POTASSIUM SERPL-SCNC: 4.2 MMOL/L — SIGNIFICANT CHANGE UP (ref 3.5–5.3)
PROT SERPL-MCNC: 8.8 G/DL — HIGH (ref 6–8.3)
PROT UR-MCNC: NEGATIVE MG/DL — SIGNIFICANT CHANGE UP
PROTHROM AB SERPL-ACNC: 12.9 SEC — HIGH (ref 9.8–12.7)
RBC # BLD: 3.99 M/UL — LOW (ref 4.2–5.8)
RBC # FLD: 17.6 % — HIGH (ref 10.3–14.5)
RH IG SCN BLD-IMP: POSITIVE — SIGNIFICANT CHANGE UP
SODIUM SERPL-SCNC: 132 MMOL/L — LOW (ref 135–145)
SP GR SPEC: 1.01 — SIGNIFICANT CHANGE UP (ref 1.01–1.02)
T3 SERPL-MCNC: 78 NG/DL — LOW (ref 80–200)
T4 AB SER-ACNC: 10.3 UG/DL — SIGNIFICANT CHANGE UP (ref 4.6–12)
TSH SERPL-MCNC: 1.67 UIU/ML — SIGNIFICANT CHANGE UP (ref 0.27–4.2)
UROBILINOGEN FLD QL: 1 MG/DL — SIGNIFICANT CHANGE UP
WBC # BLD: 11.4 K/UL — HIGH (ref 3.8–10.5)
WBC # FLD AUTO: 11.4 K/UL — HIGH (ref 3.8–10.5)

## 2017-10-15 PROCEDURE — 71010: CPT | Mod: 26

## 2017-10-15 RX ORDER — CARVEDILOL PHOSPHATE 80 MG/1
12.5 CAPSULE, EXTENDED RELEASE ORAL EVERY 12 HOURS
Qty: 0 | Refills: 0 | Status: DISCONTINUED | OUTPATIENT
Start: 2017-10-15 | End: 2017-10-19

## 2017-10-15 RX ORDER — MEXILETINE HYDROCHLORIDE 150 MG/1
150 CAPSULE ORAL EVERY 8 HOURS
Qty: 0 | Refills: 0 | Status: DISCONTINUED | OUTPATIENT
Start: 2017-10-15 | End: 2017-10-19

## 2017-10-15 RX ORDER — FUROSEMIDE 40 MG
20 TABLET ORAL EVERY OTHER DAY
Qty: 0 | Refills: 0 | Status: DISCONTINUED | OUTPATIENT
Start: 2017-10-15 | End: 2017-10-19

## 2017-10-15 RX ORDER — HYDRALAZINE HCL 50 MG
25 TABLET ORAL EVERY 8 HOURS
Qty: 0 | Refills: 0 | Status: DISCONTINUED | OUTPATIENT
Start: 2017-10-15 | End: 2017-10-19

## 2017-10-15 RX ORDER — ASCORBIC ACID 60 MG
500 TABLET,CHEWABLE ORAL DAILY
Qty: 0 | Refills: 0 | Status: DISCONTINUED | OUTPATIENT
Start: 2017-10-15 | End: 2017-10-19

## 2017-10-15 RX ORDER — AMIODARONE HYDROCHLORIDE 400 MG/1
200 TABLET ORAL DAILY
Qty: 0 | Refills: 0 | Status: DISCONTINUED | OUTPATIENT
Start: 2017-10-15 | End: 2017-10-19

## 2017-10-15 RX ORDER — FERROUS SULFATE 325(65) MG
325 TABLET ORAL DAILY
Qty: 0 | Refills: 0 | Status: DISCONTINUED | OUTPATIENT
Start: 2017-10-15 | End: 2017-10-19

## 2017-10-15 RX ORDER — QUETIAPINE FUMARATE 200 MG/1
50 TABLET, FILM COATED ORAL DAILY
Qty: 0 | Refills: 0 | Status: DISCONTINUED | OUTPATIENT
Start: 2017-10-15 | End: 2017-10-19

## 2017-10-15 RX ORDER — FOLIC ACID 0.8 MG
1 TABLET ORAL DAILY
Qty: 0 | Refills: 0 | Status: DISCONTINUED | OUTPATIENT
Start: 2017-10-15 | End: 2017-10-19

## 2017-10-15 RX ORDER — SPIRONOLACTONE 25 MG/1
25 TABLET, FILM COATED ORAL DAILY
Qty: 0 | Refills: 0 | Status: DISCONTINUED | OUTPATIENT
Start: 2017-10-15 | End: 2017-10-19

## 2017-10-15 RX ORDER — DOCUSATE SODIUM 100 MG
100 CAPSULE ORAL THREE TIMES A DAY
Qty: 0 | Refills: 0 | Status: DISCONTINUED | OUTPATIENT
Start: 2017-10-15 | End: 2017-10-19

## 2017-10-15 RX ADMIN — MEXILETINE HYDROCHLORIDE 150 MILLIGRAM(S): 150 CAPSULE ORAL at 21:58

## 2017-10-15 RX ADMIN — CARVEDILOL PHOSPHATE 12.5 MILLIGRAM(S): 80 CAPSULE, EXTENDED RELEASE ORAL at 18:53

## 2017-10-15 RX ADMIN — Medication 25 MILLIGRAM(S): at 21:58

## 2017-10-15 RX ADMIN — Medication 100 MILLIGRAM(S): at 21:58

## 2017-10-15 NOTE — H&P ADULT - REASON FOR ADMISSION
transferred back from [facility] after rejected candidacy for heart transplant tx for further work  up

## 2017-10-15 NOTE — H&P ADULT - NSHPREVIEWOFSYSTEMS_GEN_ALL_CORE
NO chest pain , no nausea / vomiting on exam    General:  no weakness, fatigue, fevers or chills  Skin: no itching, burning, rashes, or lesions   HEENT: no visual changes;  no headache, no vertigo, no recent colds, nasal stuffiness or sore throat   Neck: no pain, stiffness or swollen glands  Respiratory: no cough, sputum, wheezing, hemoptysis; no shortness of breath  Cardiovascular: no chest pain, dyspnea or palpitations +LVAD sounds  GI: no abdominal or epigastric pain. no heartburn, nausea, vomiting, or hematemesis; no diarrhea or constipation. no melena or hematochezia  : no dysuria, frequency or hematuria  Peripheral Vascular: no intermittent claudication, leg cramps, varicose veins, swelling or swelling with redness and tenderness  Musculoskeletal: RUVALCABA's  Neuro: no changes in orientation, memory, insight or judgment, no changes in mood, attention or speech.  no dizziness, vertigo or fainting, numbness, tingling or weakness

## 2017-10-15 NOTE — H&P ADULT - NSHPPHYSICALEXAM_GEN_ALL_CORE
Physical Exam:     GENERAL: male in no acute distress, well-developed  HEAD:  Atraumatic, Normocephalic  ENT: EOMI, PERRLA, conjunctiva and sclera clear, Neck supple, No JVD, moist mucosa, no pharyngeal erythema, no tonsillar enlargement or exudate  CHEST/LUNG: Clear to auscultation bilaterally; No wheeze, no rhonchi or rales, no crackles, equal breath sounds bilaterally   HEART: +LVAD HUM  ABDOMEN: Soft, Nontender, Nondistended; Bowel sounds present, no organomegaly  EXTREMITIES:  No clubbing, cyanosis, or edema  PSYCH: Nl behavior, nl affect  NEUROLOGY: AAOx3, non-focal, cranial nerves intact  SKIN: Normal color, No rashes or lesions

## 2017-10-15 NOTE — H&P ADULT - PROBLEM SELECTOR PLAN 1
orthopedic surgery consult in am  decreased mobility d/t chronic knee pain  will call orthopedic consult in am for left knee replacement.

## 2017-10-15 NOTE — H&P ADULT - HISTORY OF PRESENT ILLNESS
67 year old man with chronic systolic heart failure, ACC/AHA stage D, due to nonischemic cardiomyopathy, s/p LVAD 6/12/17, GIB s/p cautery 7/25/17 , known AV malformation, now  chronic anemia numerous GIBs now off AC recently transferred back to [facility] for evaluation for heart transplant pts candidacy rejected due to numerous factors one of them being requirement for further orthopedic work up and functional assessment . denies CP - driveline site intact

## 2017-10-15 NOTE — H&P ADULT - PSH
AICD (Automatic Cardioverter/Defibrillator) Present  [employer] [place] with 1 [employer] [place] lead4/1/09- explanted and replaced with [id number] 2 leads on 9/2/09  History of Prior Ablation Treatment  for afib  LVAD (left ventricular assist device) present    Status post left hip replacement

## 2017-10-15 NOTE — H&P ADULT - ASSESSMENT
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 Pt. off coumadin x 3 mos after recent hospitalization d/t 2nd GIB.  Transferred from [facility] this am after heart transplant team recommended further work-up d/t decreased mobility from chronic osteoarthritis r knee impeding ambulation and post-transplant rehabilitation.

## 2017-10-15 NOTE — H&P ADULT - PROBLEM SELECTOR PLAN 4
no coumadin x 3 mos.  PPI po bid no coumadin x 3 mos.  PPI po bid  *NO ASA OR COUMADIN D/T GIB X 2 W/ CAUTERY POST-LVAD IMPLANT AS PER CHF TEAM

## 2017-10-16 DIAGNOSIS — I50.22 CHRONIC SYSTOLIC (CONGESTIVE) HEART FAILURE: ICD-10-CM

## 2017-10-16 DIAGNOSIS — M17.11 UNILATERAL PRIMARY OSTEOARTHRITIS, RIGHT KNEE: ICD-10-CM

## 2017-10-16 DIAGNOSIS — K29.51 UNSPECIFIED CHRONIC GASTRITIS WITH BLEEDING: ICD-10-CM

## 2017-10-16 LAB
ALBUMIN SERPL ELPH-MCNC: 3.4 G/DL — SIGNIFICANT CHANGE UP (ref 3.3–5)
ALP SERPL-CCNC: 144 U/L — HIGH (ref 40–120)
ALT FLD-CCNC: 81 U/L RC — HIGH (ref 10–45)
ANION GAP SERPL CALC-SCNC: 14 MMOL/L — SIGNIFICANT CHANGE UP (ref 5–17)
APTT BLD: 30 SEC — SIGNIFICANT CHANGE UP (ref 27.5–37.4)
AST SERPL-CCNC: 84 U/L — HIGH (ref 10–40)
BILIRUB SERPL-MCNC: 0.5 MG/DL — SIGNIFICANT CHANGE UP (ref 0.2–1.2)
BUN SERPL-MCNC: 32 MG/DL — HIGH (ref 7–23)
CALCIUM SERPL-MCNC: 9.2 MG/DL — SIGNIFICANT CHANGE UP (ref 8.4–10.5)
CHLORIDE SERPL-SCNC: 99 MMOL/L — SIGNIFICANT CHANGE UP (ref 96–108)
CO2 SERPL-SCNC: 21 MMOL/L — LOW (ref 22–31)
CREAT SERPL-MCNC: 1.41 MG/DL — HIGH (ref 0.5–1.3)
GLUCOSE SERPL-MCNC: 101 MG/DL — HIGH (ref 70–99)
HCT VFR BLD CALC: 32.8 % — LOW (ref 39–50)
HGB BLD-MCNC: 10.1 G/DL — LOW (ref 13–17)
INR BLD: 1.19 RATIO — HIGH (ref 0.88–1.16)
LDH SERPL L TO P-CCNC: 269 U/L — HIGH (ref 50–242)
MAGNESIUM SERPL-MCNC: 2 MG/DL — SIGNIFICANT CHANGE UP (ref 1.6–2.6)
MCHC RBC-ENTMCNC: 27.2 PG — SIGNIFICANT CHANGE UP (ref 27–34)
MCHC RBC-ENTMCNC: 30.9 GM/DL — LOW (ref 32–36)
MCV RBC AUTO: 88.2 FL — SIGNIFICANT CHANGE UP (ref 80–100)
PLATELET # BLD AUTO: 360 K/UL — SIGNIFICANT CHANGE UP (ref 150–400)
POTASSIUM SERPL-MCNC: 4.3 MMOL/L — SIGNIFICANT CHANGE UP (ref 3.5–5.3)
POTASSIUM SERPL-SCNC: 4.3 MMOL/L — SIGNIFICANT CHANGE UP (ref 3.5–5.3)
PREALB SERPL-MCNC: 26 MG/DL — SIGNIFICANT CHANGE UP (ref 18–45)
PROT SERPL-MCNC: 7.8 G/DL — SIGNIFICANT CHANGE UP (ref 6–8.3)
PROTHROM AB SERPL-ACNC: 12.9 SEC — HIGH (ref 9.8–12.7)
RBC # BLD: 3.72 M/UL — LOW (ref 4.2–5.8)
RBC # FLD: 17.7 % — HIGH (ref 10.3–14.5)
SODIUM SERPL-SCNC: 134 MMOL/L — LOW (ref 135–145)
WBC # BLD: 9.6 K/UL — SIGNIFICANT CHANGE UP (ref 3.8–10.5)
WBC # FLD AUTO: 9.6 K/UL — SIGNIFICANT CHANGE UP (ref 3.8–10.5)

## 2017-10-16 PROCEDURE — 93010 ELECTROCARDIOGRAM REPORT: CPT

## 2017-10-16 PROCEDURE — 73562 X-RAY EXAM OF KNEE 3: CPT | Mod: 26,RT

## 2017-10-16 RX ADMIN — CARVEDILOL PHOSPHATE 12.5 MILLIGRAM(S): 80 CAPSULE, EXTENDED RELEASE ORAL at 18:10

## 2017-10-16 RX ADMIN — SPIRONOLACTONE 25 MILLIGRAM(S): 25 TABLET, FILM COATED ORAL at 06:09

## 2017-10-16 RX ADMIN — CARVEDILOL PHOSPHATE 12.5 MILLIGRAM(S): 80 CAPSULE, EXTENDED RELEASE ORAL at 06:09

## 2017-10-16 RX ADMIN — MEXILETINE HYDROCHLORIDE 150 MILLIGRAM(S): 150 CAPSULE ORAL at 21:48

## 2017-10-16 RX ADMIN — AMIODARONE HYDROCHLORIDE 200 MILLIGRAM(S): 400 TABLET ORAL at 05:25

## 2017-10-16 RX ADMIN — Medication 100 MILLIGRAM(S): at 12:48

## 2017-10-16 RX ADMIN — MEXILETINE HYDROCHLORIDE 150 MILLIGRAM(S): 150 CAPSULE ORAL at 14:18

## 2017-10-16 RX ADMIN — Medication 500 MILLIGRAM(S): at 12:48

## 2017-10-16 RX ADMIN — MEXILETINE HYDROCHLORIDE 150 MILLIGRAM(S): 150 CAPSULE ORAL at 06:09

## 2017-10-16 RX ADMIN — Medication 25 MILLIGRAM(S): at 05:25

## 2017-10-16 RX ADMIN — Medication 25 MILLIGRAM(S): at 21:48

## 2017-10-16 RX ADMIN — Medication 1 MILLIGRAM(S): at 12:48

## 2017-10-16 RX ADMIN — Medication 100 MILLIGRAM(S): at 05:25

## 2017-10-16 RX ADMIN — Medication 25 MILLIGRAM(S): at 14:18

## 2017-10-16 RX ADMIN — QUETIAPINE FUMARATE 50 MILLIGRAM(S): 200 TABLET, FILM COATED ORAL at 12:49

## 2017-10-16 RX ADMIN — Medication 100 MILLIGRAM(S): at 21:48

## 2017-10-16 RX ADMIN — Medication 325 MILLIGRAM(S): at 12:48

## 2017-10-16 RX ADMIN — Medication 20 MILLIGRAM(S): at 12:49

## 2017-10-16 NOTE — PROGRESS NOTE ADULT - ASSESSMENT
67 year old man with chronic systolic heart failure, ACC/AHA stage D, due to nonischemic cardiomyopathy, s/p LVAD 6/12/17, GIB s/p cautery 7/25/17 , known AV malformation, now  chronic anemia numerous GIBs now off AC recently transferred back to [facility] for evaluation for heart transplant- pts candidacy rejected due to numerous factors one of them being requirement for further orthopedic work up and functional assessment . denies CP - driveline site intact  PMH of CHF, GIB, HTN, Non-Ischemic Cardiomyopathy, PAF, SVT, VT s/p AICD.  10/16 pt stable; CHF team following; right knee xrays performed this am: + advanced osteoarthritis and moderate effusion- await ortho consult  progre

## 2017-10-16 NOTE — CONSULT NOTE ADULT - SUBJECTIVE AND OBJECTIVE BOX
67 year old man with chronic systolic heart failure, ACC/AHA stage D, due to nonischemic cardiomyopathy, s/p LVAD 6/12/17, GIB s/p cautery 7/25/17 , known AV malformation, now  chronic anemia numerous GIBs now off AC recently transferred back to [facility] for evaluation for heart transplant- pts candidacy rejected due to numerous factors one of them being requirement for further orthopedic work up and functional assessment . denies CP - driveline site intact    Consult was called for evaluation for right knee replacement. The patient has severe pain with walking, having difficulty ambulating for more than 5 minutes secondary to pain. pain is worse in the morning and when walking up and down stairs. does not have complaints about the left knee. has a history of a left hip replacement "years ago" at [facility] with Dr. Loja. Does not remember what the hip replacement was for, says the hip "gave out" on him. no complaints with the left hip, the incision is well healed.     ICU Vital Signs Last 24 Hrs  T(C): 36.6 (16 Oct 2017 09:31), Max: 36.9 (15 Oct 2017 22:05)  T(F): 97.8 (16 Oct 2017 09:31), Max: 98.4 (15 Oct 2017 22:05)  HR: 71 (16 Oct 2017 09:31) (71 - 98)  BP: 97/73 (16 Oct 2017 09:31) (97/73 - 97/73)  BP(mean): 80 (16 Oct 2017 09:31) (78 - 88)  ABP: --  ABP(mean): --  RR: 18 (16 Oct 2017 09:31) (16 - 18)  SpO2: 99% (16 Oct 2017 09:31) (98% - 100%)                          10.1   9.6   )-----------( 360      ( 16 Oct 2017 04:29 )             32.8     10-16    134<L>  |  99  |  32<H>  ----------------------------<  101<H>  4.3   |  21<L>  |  1.41<H>    Ca    9.2      16 Oct 2017 04:29  Mg     2.0     10-16    TPro  7.8  /  Alb  3.4  /  TBili  0.5  /  DBili  x   /  AST  84<H>  /  ALT  81<H>  /  AlkPhos  144<H>  10-16      RLE:  Skin intact. ttp over medial and lateral joint line. ROM 0-95. negative varus/valgus stress, negative ant/post drawer, varus malalignment, +DP/PT Pulse 2+/4.  SILT L2-S1, +EHL/FHL/TA/Gastroc, soft compartments, no calf ttp.    R/L LE / B/L UE:   No bony TTP, Good ROM w/o pain. Able to SLR on R/L. Exam Unremarkable      Imaging:  XR of R knee reveal severe tricompartmental osteoarthritis    A/P:     67yMale w/ R knee osteoarthritis, orthopaedic evaluation for total knee replacement to continue as an outpatient  - no acute orthopaedic surgery intervention  - Pain control  - WBAT RLE  - PT   - NSAIDs  - neoprene Knee brace for comfort  - may follow up with Dr. Wright as OP [phone]

## 2017-10-16 NOTE — PROGRESS NOTE ADULT - PROBLEM SELECTOR PLAN 1
Orthopedic surgery consult called: consult appreciated will follow as an outpatient  right knee xrays performed this am: + advanced osteoarthritis and moderate effusion-.

## 2017-10-16 NOTE — PROGRESS NOTE ADULT - SUBJECTIVE AND OBJECTIVE BOX
Patient seen and evaluated @   Chief Complaint:     HPI:  67 year old man with chronic systolic heart failure, ACC/AHA stage D, due to nonischemic cardiomyopathy, s/p LVAD 17, GIB s/p cautery 17 , known AV malformation, now  chronic anemia numerous GIBs now off AC recently transferred back to [facility] for evaluation for heart transplant pts candidacy rejected due to numerous factors one of them being requirement for further orthopedic work up and functional assessment . denies CP - driveline site intact (15 Oct 2017 16:06)    PMH:   GIB (gastrointestinal bleeding)  H/O prior ablation treatment  Ventricular fibrillation  PAF (paroxysmal atrial fibrillation)  Non-Ischemic Cardiomyopathy  SVT (Supraventricular Tachycardia)  HTN  CHF (Congestive Heart Failure)    PSH:   LVAD (left ventricular assist device) present  Status post left hip replacement  Hypertension  Hypertension  History of Prior Ablation Treatment  AICD (Automatic Cardioverter/Defibrillator) Present    Medications:   amiodarone    Tablet 200 milliGRAM(s) Oral daily  ascorbic acid 500 milliGRAM(s) Oral daily  carvedilol 12.5 milliGRAM(s) Oral every 12 hours  docusate sodium 100 milliGRAM(s) Oral three times a day  ferrous    sulfate 325 milliGRAM(s) Oral daily  folic acid 1 milliGRAM(s) Oral daily  furosemide    Tablet 20 milliGRAM(s) Oral every other day  hydrALAZINE 25 milliGRAM(s) Oral every 8 hours  mexiletine 150 milliGRAM(s) Oral every 8 hours  QUEtiapine 50 milliGRAM(s) Oral daily  spironolactone 25 milliGRAM(s) Oral daily    Allergies:  No Known Allergies    FAMILY HISTORY:  No pertinent family history in first degree relatives    LVAD Interrogation: no alarms noted  Pump Flow: 5.1  Pump Speed: 9200  Pulse Index: 6.3   Pump Power: 5.4  VAD Events: none  Driveline evaluation:  dressing changes, no erythema, no drainage noted  Programming Changes: [X ] No changes made [ ] Changes made:            Review of Systems:  Constitutional: [ ] Fever [ ] Chills [ ] Fatigue [ ] Weight change   HEENT: [ ] Blurred vision [ ] Eye Pain [ ] Headache [ ] Runny nose [ ] Sore Throat   Respiratory: [ ] Cough [ ] Wheezing [ ] Shortness of breath  Cardiovascular: [ ] Chest Pain [ ] Palpitations [ ] LOBATO [ ] PND [ ] Orthopnea  Gastrointestinal: [ ] Abdominal Pain [ ] Diarrhea [ ] Constipation [ ] Hemorrhoids [ ] Nausea [ ] Vomiting  Genitourinary: [ ] Nocturia [ ] Dysuria [ ] Incontinence  Extremities: [ ] Swelling [ ] Joint Pain  Neurologic: [ ] Focal deficit [ ] Paresthesias [ ] Syncope  Lymphatic: [ ] Swelling [ ] Lymphadenopathy   Skin: [ ] Rash [ ] Ecchymoses [ ] Wounds [ ] Lesions  Psychiatry: [ ] Depression [ ] Suicidal/Homicidal Ideation [ ] Anxiety [ ] Sleep Disturbances  [X ] 10 point review of systems is otherwise negative except as mentioned above            [ ]Unable to obtain    Physical Exam:  T(C): 36.3 (10-16-17 @ 14:23), Max: 36.9 (10-15-17 @ 22:05)  HR: 77 (10-16-17 @ 14:23) (71 - 98)  BP: 90/70 (10-16-17 @ 14:23) (90/70 - 97/73)  RR: 18 (10-16-17 @ 14:23) (17 - 18)  SpO2: 99% (10-16-17 @ 14:23) (98% - 100%)  Wt(kg): --  I&O's Detail    15 Oct 2017 07:  -  16 Oct 2017 07:00  --------------------------------------------------------  IN:    Oral Fluid: 680 mL  Total IN: 680 mL    OUT:    Voided: 1025 mL  Total OUT: 1025 mL    Total NET: -345 mL      16 Oct 2017 07:01  -  16 Oct 2017 19:01  --------------------------------------------------------  IN:    Oral Fluid: 820 mL  Total IN: 820 mL    OUT:    Voided: 500 mL  Total OUT: 500 mL    Total NET: 320 mL          Daily Weight in k.7 (16 Oct 2017 13:32)    Appearance: [X ] Normal [ ] NAD  Eyes: [ X] PERRL [ ] EOMI  HENT: [X ] Normal oral mus[place]a [ ]NC/AT  Cardiovascular: [ ] S1 [ ] S2 [ ] RRR [ ] No m/r/g [ ]No edema [ ] JVP +hum of LVAD  Respiratory: [X ] Clear to auscultation bilaterally  Gastrointestinal: [X ] Soft X[ ] Non-tender [X ] Non-distended X[ ] BS+  Musculoskeletal: [ ] No clubbing [ ] No joint deformity   Neurologic: [X ] Non-focal  Lymphatic: [X ] No lymphadenopathy  Psychiatry: [X ] AAOx3 [ ] Mood & affect appropriate  Skin: [X ] No rashes [ ] No ecchymoses [ ] No cyanosis    Labs:                        10.1   9.6   )-----------( 360      ( 16 Oct 2017 04:29 )             32.8     10-    134<L>  |  99  |  32<H>  ----------------------------<  101<H>  4.3   |  21<L>  |  1.41<H>    Ca    9.2      16 Oct 2017 04:29  Mg     2.0     10-    TPro  7.8  /  Alb  3.4  /  TBili  0.5  /  DBili  x   /  AST  84<H>  /  ALT  81<H>  /  AlkPhos  144<H>  10-    PT/INR - ( 16 Oct 2017 04:29 )   PT: 12.9 sec;   INR: 1.19 ratio       LDH: 269  PTT - ( 16 Oct 2017 04:29 )  PTT:30.0 sec          Hemoglobin A1C, Whole Blood: 5.2 % (10-15 @ 21:40)    Thyroid Stimulating Hormone, Serum: 1.67 uIU/mL (10-15 @ 21:40)

## 2017-10-16 NOTE — PROGRESS NOTE ADULT - SUBJECTIVE AND OBJECTIVE BOX
VITAL SIGNS    Telemetry:  rsr 1st degree 70-80 with 6 bts wct   Vital Signs Last 24 Hrs  T(C): 36.6 (10-16-17 @ 09:31), Max: 36.9 (10-15-17 @ 22:05)  T(F): 97.8 (10-16-17 @ 09:31), Max: 98.4 (10-15-17 @ 22:05)  HR: 71 (10-16-17 @ 09:31) (71 - 98)  BP: 97/73 (10-16-17 @ 09:31) (97/73 - 97/73)  RR: 18 (10-16-17 @ 09:31) (16 - 18)  SpO2: 99% (10-16-17 @ 09:31) (98% - 100%)            10-15 @ 07:01  -  10-16 @ 07:00  --------------------------------------------------------  IN: 680 mL / OUT: 1025 mL / NET: -345 mL    10-16 @ 07:01  -  10-16 @ 12:20  --------------------------------------------------------  IN: 460 mL / OUT: 0 mL / NET: 460 mL       Daily Height in cm: 172.72 (15 Oct 2017 15:40)    Daily   Admit Wt: Drug Dosing Weight  Height (cm): 172.72 (15 Oct 2017 15:40)  Weight (kg): 67.3 (15 Oct 2017 15:40)  BMI (kg/m2): 22.6 (15 Oct 2017 15:40)  BSA (m2): 1.8 (15 Oct 2017 15:40)    Bilirubin Total, Serum: 0.5 mg/dL (10-16 @ 04:29)  Bilirubin Total, Serum: 0.7 mg/dL (10-15 @ 19:39)  amiodarone    Tablet 200 milliGRAM(s) Oral daily  ascorbic acid 500 milliGRAM(s) Oral daily  carvedilol 12.5 milliGRAM(s) Oral every 12 hours  docusate sodium 100 milliGRAM(s) Oral three times a day  ferrous    sulfate 325 milliGRAM(s) Oral daily  folic acid 1 milliGRAM(s) Oral daily  furosemide    Tablet 20 milliGRAM(s) Oral every other day  hydrALAZINE 25 milliGRAM(s) Oral every 8 hours  mexiletine 150 milliGRAM(s) Oral every 8 hours  QUEtiapine 50 milliGRAM(s) Oral daily  spironolactone 25 milliGRAM(s) Oral daily      PHYSICAL EXAM    Subjective: "Hi.   Neurology: alert and oriented x 3, nonfocal, no gross deficits  CV : tele:  RSR  Sternal Wound :  CDI with dressing , Stable  Lungs: clear. RR easy, unlabored   Abdomen: soft, nontender, nondistended, positive bowel sounds, bowel movement   Neg N/V/D   :  pt voiding without difficulty   Extremities:   RUVALCABA; edema, neg calf tenderness.   PPP bilaterally VITAL SIGNS    Telemetry:  rsr 1st degree 70-80 with 6 bts wct   Vital Signs Last 24 Hrs  T(C): 36.6 (10-16-17 @ 09:31), Max: 36.9 (10-15-17 @ 22:05)  T(F): 97.8 (10-16-17 @ 09:31), Max: 98.4 (10-15-17 @ 22:05)  HR: 71 (10-16-17 @ 09:31) (71 - 98)  BP: 97/73 (10-16-17 @ 09:31) (97/73 - 97/73)  RR: 18 (10-16-17 @ 09:31) (16 - 18)  SpO2: 99% (10-16-17 @ 09:31) (98% - 100%)            10-15 @ 07:01  -  10-16 @ 07:00  --------------------------------------------------------  IN: 680 mL / OUT: 1025 mL / NET: -345 mL    10-16 @ 07:01  -  10-16 @ 12:20  --------------------------------------------------------  IN: 460 mL / OUT: 0 mL / NET: 460 mL       Daily Height in cm: 172.72 (15 Oct 2017 15:40)    Daily   Admit Wt: Drug Dosing Weight  Height (cm): 172.72 (15 Oct 2017 15:40)  Weight (kg): 67.3 (15 Oct 2017 15:40)  BMI (kg/m2): 22.6 (15 Oct 2017 15:40)  BSA (m2): 1.8 (15 Oct 2017 15:40)    Bilirubin Total, Serum: 0.5 mg/dL (10-16 @ 04:29)  Bilirubin Total, Serum: 0.7 mg/dL (10-15 @ 19:39)  amiodarone    Tablet 200 milliGRAM(s) Oral daily  ascorbic acid 500 milliGRAM(s) Oral daily  carvedilol 12.5 milliGRAM(s) Oral every 12 hours  docusate sodium 100 milliGRAM(s) Oral three times a day  ferrous    sulfate 325 milliGRAM(s) Oral daily  folic acid 1 milliGRAM(s) Oral daily  furosemide    Tablet 20 milliGRAM(s) Oral every other day  hydrALAZINE 25 milliGRAM(s) Oral every 8 hours  mexiletine 150 milliGRAM(s) Oral every 8 hours  QUEtiapine 50 milliGRAM(s) Oral daily  spironolactone 25 milliGRAM(s) Oral daily      PHYSICAL EXAM    Subjective: "My right knee hurts."   Neurology: alert and oriented x 3, nonfocal, no gross deficits  CV : tele:   rsr 1st degree 70-80 with 6 bts wct   Sternal Wound :  CDI NAZ  drive line dressing cdi   Lungs: clear. RR easy, unlabored   Abdomen: soft, nontender, nondistended, positive bowel sounds, bowel movement   Neg N/V/D   :  pt voiding without difficulty   Extremities:   RUVALCABA; neg LE edema, neg calf tenderness.   PPP bilaterally;  trace right knee edema

## 2017-10-16 NOTE — PROGRESS NOTE ADULT - ASSESSMENT
67 year old man with chronic systolic heart failure, ACC/AHA stage D, due to nonischemic cardiomyopathy, s/p LVAD 6/12/17, GIB s/p cautery 7/25/17 , known AV malformation, now  chronic anemia numerous GIBs now off AC [facility] pts candidacy rejected due to numerous factors one of them being requirement for further orthopedic work up and functional assessment . denies CP - driveline site intact 67 year old man with chronic systolic heart failure, ACC/AHA stage D, due to nonischemic cardiomyopathy, s/p LVAD 6/12/17, GIB s/p cautery 7/25/17 , known AV malformation, now  chronic anemia numerous GIBs now off AC recently transferred back to [facility] for evaluation for heart transplant- pts candidacy rejected due to numerous factors one of them being requirement for further orthopedic work up and functional assessment . denies CP - driveline site intact  PMH of CHF, GIB, HTN, Non-Ischemic Cardiomyopathy, PAF, SVT, VT s/p AICD.  10/16 pt stable; CHF team following; right knee xrays performed this am: + advanced osteoarthritis and moderate effusion- await ortho consult

## 2017-10-16 NOTE — PROGRESS NOTE ADULT - PROBLEM SELECTOR PLAN 1
orthopedic surgery consult in am  decreased mobility d/t chronic knee pain  will call orthopedic consult in am for left knee replacement. orthopedic surgery consult called- consult pending  right knee xrays performed this am: + advanced osteoarthritis and moderate effusion-

## 2017-10-17 ENCOUNTER — APPOINTMENT (OUTPATIENT)
Dept: GASTROENTEROLOGY | Facility: CLINIC | Age: 67
End: 2017-10-17

## 2017-10-17 DIAGNOSIS — I50.23 ACUTE ON CHRONIC SYSTOLIC (CONGESTIVE) HEART FAILURE: ICD-10-CM

## 2017-10-17 LAB
ANION GAP SERPL CALC-SCNC: 14 MMOL/L — SIGNIFICANT CHANGE UP (ref 5–17)
BUN SERPL-MCNC: 38 MG/DL — HIGH (ref 7–23)
CALCIUM SERPL-MCNC: 8.9 MG/DL — SIGNIFICANT CHANGE UP (ref 8.4–10.5)
CHLORIDE SERPL-SCNC: 100 MMOL/L — SIGNIFICANT CHANGE UP (ref 96–108)
CO2 SERPL-SCNC: 20 MMOL/L — LOW (ref 22–31)
CREAT SERPL-MCNC: 1.34 MG/DL — HIGH (ref 0.5–1.3)
GLUCOSE SERPL-MCNC: 133 MG/DL — HIGH (ref 70–99)
HCT VFR BLD CALC: 32.3 % — LOW (ref 39–50)
HGB BLD-MCNC: 10.4 G/DL — LOW (ref 13–17)
INR BLD: 1.17 RATIO — HIGH (ref 0.88–1.16)
LDH SERPL L TO P-CCNC: 268 U/L — HIGH (ref 50–242)
MCHC RBC-ENTMCNC: 28.4 PG — SIGNIFICANT CHANGE UP (ref 27–34)
MCHC RBC-ENTMCNC: 32.1 GM/DL — SIGNIFICANT CHANGE UP (ref 32–36)
MCV RBC AUTO: 88.5 FL — SIGNIFICANT CHANGE UP (ref 80–100)
PLATELET # BLD AUTO: 324 K/UL — SIGNIFICANT CHANGE UP (ref 150–400)
POTASSIUM SERPL-MCNC: 4.1 MMOL/L — SIGNIFICANT CHANGE UP (ref 3.5–5.3)
POTASSIUM SERPL-SCNC: 4.1 MMOL/L — SIGNIFICANT CHANGE UP (ref 3.5–5.3)
PROTHROM AB SERPL-ACNC: 12.8 SEC — HIGH (ref 9.8–12.7)
RBC # BLD: 3.65 M/UL — LOW (ref 4.2–5.8)
RBC # FLD: 17.5 % — HIGH (ref 10.3–14.5)
SODIUM SERPL-SCNC: 134 MMOL/L — LOW (ref 135–145)
WBC # BLD: 11 K/UL — HIGH (ref 3.8–10.5)
WBC # FLD AUTO: 11 K/UL — HIGH (ref 3.8–10.5)

## 2017-10-17 PROCEDURE — 90832 PSYTX W PT 30 MINUTES: CPT

## 2017-10-17 RX ORDER — COLCHICINE 0.6 MG
0.6 TABLET ORAL
Qty: 0 | Refills: 0 | Status: DISCONTINUED | OUTPATIENT
Start: 2017-10-18 | End: 2017-10-19

## 2017-10-17 RX ORDER — HYDROCORTISONE 1 %
1 OINTMENT (GRAM) TOPICAL
Qty: 0 | Refills: 0 | Status: DISCONTINUED | OUTPATIENT
Start: 2017-10-17 | End: 2017-10-19

## 2017-10-17 RX ORDER — COLCHICINE 0.6 MG
1.2 TABLET ORAL ONCE
Qty: 0 | Refills: 0 | Status: COMPLETED | OUTPATIENT
Start: 2017-10-17 | End: 2017-10-17

## 2017-10-17 RX ORDER — LIDOCAINE 4 G/100G
1 CREAM TOPICAL DAILY
Qty: 0 | Refills: 0 | Status: DISCONTINUED | OUTPATIENT
Start: 2017-10-17 | End: 2017-10-19

## 2017-10-17 RX ADMIN — Medication 100 MILLIGRAM(S): at 14:49

## 2017-10-17 RX ADMIN — QUETIAPINE FUMARATE 50 MILLIGRAM(S): 200 TABLET, FILM COATED ORAL at 12:07

## 2017-10-17 RX ADMIN — Medication 25 MILLIGRAM(S): at 14:49

## 2017-10-17 RX ADMIN — MEXILETINE HYDROCHLORIDE 150 MILLIGRAM(S): 150 CAPSULE ORAL at 05:38

## 2017-10-17 RX ADMIN — MEXILETINE HYDROCHLORIDE 150 MILLIGRAM(S): 150 CAPSULE ORAL at 14:49

## 2017-10-17 RX ADMIN — LIDOCAINE 1 PATCH: 4 CREAM TOPICAL at 14:48

## 2017-10-17 RX ADMIN — Medication 325 MILLIGRAM(S): at 12:06

## 2017-10-17 RX ADMIN — AMIODARONE HYDROCHLORIDE 200 MILLIGRAM(S): 400 TABLET ORAL at 05:37

## 2017-10-17 RX ADMIN — Medication 1 APPLICATION(S): at 20:57

## 2017-10-17 RX ADMIN — Medication 25 MILLIGRAM(S): at 05:38

## 2017-10-17 RX ADMIN — SPIRONOLACTONE 25 MILLIGRAM(S): 25 TABLET, FILM COATED ORAL at 05:38

## 2017-10-17 RX ADMIN — Medication 1.2 MILLIGRAM(S): at 14:49

## 2017-10-17 RX ADMIN — Medication 500 MILLIGRAM(S): at 12:06

## 2017-10-17 RX ADMIN — Medication 25 MILLIGRAM(S): at 20:56

## 2017-10-17 RX ADMIN — Medication 100 MILLIGRAM(S): at 05:38

## 2017-10-17 RX ADMIN — MEXILETINE HYDROCHLORIDE 150 MILLIGRAM(S): 150 CAPSULE ORAL at 20:56

## 2017-10-17 RX ADMIN — CARVEDILOL PHOSPHATE 12.5 MILLIGRAM(S): 80 CAPSULE, EXTENDED RELEASE ORAL at 05:38

## 2017-10-17 RX ADMIN — Medication 100 MILLIGRAM(S): at 20:56

## 2017-10-17 RX ADMIN — Medication 1 APPLICATION(S): at 05:40

## 2017-10-17 RX ADMIN — CARVEDILOL PHOSPHATE 12.5 MILLIGRAM(S): 80 CAPSULE, EXTENDED RELEASE ORAL at 18:05

## 2017-10-17 RX ADMIN — Medication 1 MILLIGRAM(S): at 12:07

## 2017-10-17 NOTE — PROGRESS NOTE ADULT - SUBJECTIVE AND OBJECTIVE BOX
Behavioral Cardiology Progress Note     HPI:  Mr. Baez is a 67 year old man with chronic systolic heart failure, ACC/AHA stage D, due to nonischemic cardiomyopathy, s/p LVAD 6/12/17, GIB s/p cautery 7/25/17 , known AV malformation, now  chronic anemia numerous GIBs now off AC recently transferred back to [facility] for evaluation for heart transplant pts candidacy rejected due to numerous factors one of them being requirement for further orthopedic work up and functional assessment.   Right knee xrays performed this am: + advanced osteoarthritis and moderate effusion.   Behavioral Health Assessment:     Mood:  "my knee hurts when I walk."          Current stressors:   Readmission to hospital   Adjustment to living with LVAD        Support system/family support: Good support from family and close friend      Coping strategies: Talking with family and staff, watching TV; thinking about his granddaughter     Understanding of medical illness and treatment plan:  Understands reasons for this admission and plan.  Good understanding of LVAD management; benefiting from ongoing education and review.      MSE: Pt seen resting in bed.  A&Ox3.  Well related with good eye contact.  Thought process goal directed.  No abnormal thought content; denies s/i.  Mood upbeat.  Affect full range.  Insight and judgment adequate.

## 2017-10-17 NOTE — PROGRESS NOTE ADULT - ASSESSMENT
67 year old man with chronic systolic heart failure, ACC/AHA stage D, due to nonischemic cardiomyopathy, s/p LVAD 6/12/17, GIB s/p cautery 7/25/17 , known AV malformation, now  chronic anemia numerous GIBs now off AC recently transferred back to [facility] for evaluation for heart transplant- pts candidacy rejected due to numerous factors one of them being requirement for further orthopedic work up and functional assessment . denies CP - driveline site intact  PMH of CHF, GIB, HTN, Non-Ischemic Cardiomyopathy, PAF, SVT, VT s/p AICD.  10/16 pt stable; CHF team following; right knee xrays performed this am: + advanced osteoarthritis and moderate effusion- await ortho consult

## 2017-10-17 NOTE — PROGRESS NOTE ADULT - SUBJECTIVE AND OBJECTIVE BOX
Subjective  hello oobin chair no acute distress    VITAL SIGNS    Telemetry:  SR 70-90    Vital Signs Last 24 Hrs  T(C): 36.4 (10-17-17 @ 09:40), Max: 36.7 (10-16-17 @ 13:32)  T(F): 97.6 (10-17-17 @ 09:40), Max: 98.1 (10-17-17 @ 05:27)  HR: 74 (10-17-17 @ 09:40) (67 - 81)  BP: 98/72 (10-16-17 @ 20:48) (89/71 - 98/72)  RR: 18 (10-17-17 @ 05:27) (18 - 18)  SpO2: 99% (10-17-17 @ 05:27) (98% - 100%)           10-16 @ 07:01  -  10-17 @ 07:00  --------------------------------------------------------  IN: 1240 mL / OUT: 1600 mL / NET: -360 mL    10-17 @ 07:01  -  10-17 @ 12:54  --------------------------------------------------------  IN: 680 mL / OUT: 450 mL / NET: 230 mL    Daily Weight in k.1 (17 Oct 2017 07:09)  Coumadin      NO  -GIB   PHYSICAL EXAM    Neurology: alert and oriented x 3, nonfocal, no gross deficits    CV  S1 S2 LVAD sounds:  drive line cdi  Lungs:CTA  Abdomen: soft, nontender, nondistended, positive bowel sounds, last bowel movement 10/16   : Voiding             Extremities: LE warm well perfused Right knee pain           Physical Therapy Rec:   Home  [  ]   Home w/ PT  [  ]  Rehab  [  x]    Discussed with Cardiothoracic Team at AM rounds. Subjective  hello oobin chair no acute distress    VITAL SIGNS    Telemetry:  SR 70-90    Vital Signs Last 24 Hrs  T(C): 36.4 (10-17-17 @ 09:40), Max: 36.7 (10-16-17 @ 13:32)  T(F): 97.6 (10-17-17 @ 09:40), Max: 98.1 (10-17-17 @ 05:27)  HR: 74 (10-17-17 @ 09:40) (67 - 81)  BP: 98/72 (10-16-17 @ 20:48) (89/71 - 98/72)  RR: 18 (10-17-17 @ 05:27) (18 - 18)  SpO2: 99% (10-17-17 @ 05:27) (98% - 100%)           10-16 @ 07:01  -  10-17 @ 07:00  --------------------------------------------------------  IN: 1240 mL / OUT: 1600 mL / NET: -360 mL    10-17 @ 07:01  -  10-17 @ 12:54  --------------------------------------------------------  IN: 680 mL / OUT: 450 mL / NET: 230 mL    Daily Weight in k.1 (17 Oct 2017 07:09)  Coumadin      NO  -GIB   PHYSICAL EXAM    Neurology: alert and oriented x 3, nonfocal, no gross deficits    CV  S1 S2 LVAD sounds:  drive line cdi  Lungs:CTA  Abdomen: soft, nontender, nondistended, positive bowel sounds, last bowel movement 10/16   : Voiding             Extremities: LE warm well perfused Right knee pain  Rt hand pain        Physical Therapy Rec:   Home  [  ]   Home w/ PT  [  ]  Rehab  [  x]    Discussed with Cardiothoracic Team at AM rounds.

## 2017-10-17 NOTE — PROGRESS NOTE ADULT - ASSESSMENT
67 year old man with chronic systolic heart failure, ACC/AHA stage D, due to nonischemic cardiomyopathy, s/p LVAD 6/12/17, GIB s/p cautery 7/25/17 , known AV malformation, now  chronic anemia numerous GIBs now off AC recently transferred back to [facility] for evaluation for heart transplant- pts candidacy rejected due to numerous factors one of them being requirement for further orthopedic work up and functional assessment . denies CP - driveline site intact  PMH of CHF, GIB, HTN, Non-Ischemic Cardiomyopathy, PAF, SVT, VT s/p AICD.  10/16 pt stable; CHF team following; right knee xrays performed this am: + advanced osteoarthritis and moderate effusion- await ortho consult  10/17 Seen by Ortho no inpatient intervention required knee brace for comfort  lidoderm patch ordered no coumadin 2 GIB discharge planning? 67 year old man with chronic systolic heart failure, ACC/AHA stage D, due to nonischemic cardiomyopathy, s/p LVAD 6/12/17, GIB s/p cautery 7/25/17 , known AV malformation, now  chronic anemia numerous GIBs now off AC recently transferred back to [facility] for evaluation for heart transplant- pts candidacy rejected due to numerous factors one of them being requirement for further orthopedic work up and functional assessment . denies CP - driveline site intact  PMH of CHF, GIB, HTN, Non-Ischemic Cardiomyopathy, PAF, SVT, VT s/p AICD.  10/16 pt stable; CHF team following; right knee xrays performed this am: + advanced osteoarthritis and moderate effusion- await ortho consult  10/17 Seen by Ortho no inpatient intervention required- knee brace for comfort- uric acid level for am colchicine orderd  lidoderm patch ordered no coumadin 2 GIB  discharge planning?

## 2017-10-17 NOTE — PROGRESS NOTE ADULT - SUBJECTIVE AND OBJECTIVE BOX
HPI:  67 year old man with chronic systolic heart failure, ACC/AHA stage D, due to nonischemic cardiomyopathy, s/p LVAD 17, GIB s/p cautery 17 , known AV malformation, now  chronic anemia numerous GIBs now off AC recently transferred back to [facility] for evaluation for heart transplant pts candidacy rejected due to numerous factors one of them being requirement for further orthopedic work up and functional assessment . denies CP - driveline site intact (15 Oct 2017 16:06)    PMH:   GIB (gastrointestinal bleeding)  H/O prior ablation treatment  Ventricular fibrillation  PAF (paroxysmal atrial fibrillation)  Non-Ischemic Cardiomyopathy  SVT (Supraventricular Tachycardia)  HTN  CHF (Congestive Heart Failure)    PSH:   LVAD (left ventricular assist device) present  Status post left hip replacement  Hypertension  Hypertension  History of Prior Ablation Treatment  AICD (Automatic Cardioverter/Defibrillator) Present    Medications:   amiodarone    Tablet 200 milliGRAM(s) Oral daily  ascorbic acid 500 milliGRAM(s) Oral daily  carvedilol 12.5 milliGRAM(s) Oral every 12 hours  docusate sodium 100 milliGRAM(s) Oral three times a day  ferrous    sulfate 325 milliGRAM(s) Oral daily  folic acid 1 milliGRAM(s) Oral daily  furosemide    Tablet 20 milliGRAM(s) Oral every other day  hydrALAZINE 25 milliGRAM(s) Oral every 8 hours  hydrocortisone 1% Cream 1 Application(s) Topical two times a day PRN  lidocaine   Patch 1 Patch Transdermal daily  mexiletine 150 milliGRAM(s) Oral every 8 hours  QUEtiapine 50 milliGRAM(s) Oral daily  spironolactone 25 milliGRAM(s) Oral daily    Allergies:  No Known Allergies    FAMILY HISTORY:  No pertinent family history in first degree relatives    LVAD Interrogation:  Pump Flow: 5.2  Pump Speed: 9200  Pulse Index: 5.9  Pump Power: 5.3  VAD Events:  no events  Driveline evaluation:  C/D/I  Programming Changes: [X ] No changes made [ ] Changes made:      Review of Systems:  Constitutional: [ ] Fever [ ] Chills [ ] Fatigue [ ] Weight change   HEENT: [ ] Blurred vision [ ] Eye Pain [ ] Headache [ ] Runny nose [ ] Sore Throat   Respiratory: [ ] Cough [ ] Wheezing [ ] Shortness of breath  Cardiovascular: [ ] Chest Pain [ ] Palpitations [ ] LOBATO [ ] PND [ ] Orthopnea  Gastrointestinal: [ ] Abdominal Pain [ ] Diarrhea [ ] Constipation [ ] Hemorrhoids [ ] Nausea [ ] Vomiting  Genitourinary: [ ] Nocturia [ ] Dysuria [ ] Incontinence  Extremities: [ ] Swelling [ ] Joint Pain  Neurologic: [ ] Focal deficit [ ] Paresthesias [ ] Syncope  Lymphatic: [ ] Swelling [ ] Lymphadenopathy   Skin: [ ] Rash [ ] Ecchymoses [ ] Wounds [ ] Lesions  Psychiatry: [ ] Depression [ ] Suicidal/Homicidal Ideation [ ] Anxiety [ ] Sleep Disturbances  [X ] 10 point review of systems is otherwise negative except as mentioned above            [ ]Unable to obtain    Physical Exam:  T(C): 36.6 (10-17-17 @ 18:00), Max: 36.7 (10-17-17 @ 05:27)  HR: 85 (10-17-17 @ 18:00) (67 - 85)  BP: 85/67 (10-17-17 @ 18:00) (85/67 - 98/72)  RR: 18 (10-17-17 @ 18:00) (18 - 18)  SpO2: 97% (10-17-17 @ 18:00) (97% - 99%)  Wt(kg): --67.1(147.9)  I&O's Detail    16 Oct 2017 07:  -  17 Oct 2017 07:00  --------------------------------------------------------  IN:    Oral Fluid: 1240 mL  Total IN: 1240 mL    OUT:    Voided: 1600 mL  Total OUT: 1600 mL    Total NET: -360 mL      17 Oct 2017 07:  -  17 Oct 2017 19:16  --------------------------------------------------------  IN:    Oral Fluid: 800 mL  Total IN: 800 mL    OUT:    Voided: 850 mL  Total OUT: 850 mL    Total NET: -50 mL          Daily Weight in k.1 (17 Oct 2017 07:09), Weight in k.7 (16 Oct 2017 13:32)    Appearance: [x ] Normal [ ] NAD  Eyes: X[ ] PERRL [ ] EOMI  HENT: [X ] Normal oral muscosa [ ]NC/AT  Cardiovascular: [ ] S1 [ ] S2 [ ] RRR [ ] No m/r/g [ ]No edema [ ] JVP +hum of LVAD  Respiratory: [X ] Clear to auscultation bilaterally  Gastrointestinal: X[ ] Soft [X ] Non-tender [X ] Non-distended [X ] BS+  Musculoskeletal: X] No clubbing [ ] No joint deformity   Neurologic: [X] Non-focal  Lymphatic: [ ] No lymphadenopathy  Psychiatry: [X ] AAOx3 [ ] Mood & affect appropriate  Skin: [ X] No rashes [ ] No ecchymoses [ ] No cyanosis    Labs:                        10.4   11.0  )-----------( 324      ( 17 Oct 2017 05:00 )             32.3     10    134<L>  |  100  |  38<H>  ----------------------------<  133<H>  4.1   |  20<L>  |  1.34<H>    Ca    8.9      17 Oct 2017 05:00  Mg     2.0     10-    TPro  7.8  /  Alb  3.4  /  TBili  0.5  /  DBili  x   /  AST  84<H>  /  ALT  81<H>  /  AlkPhos  144<H>  10-    PT/INR - ( 17 Oct 2017 05:00 )   PT: 12.8 sec;   INR: 1.17 ratio         PTT - ( 16 Oct 2017 04:29 )  PTT:30.0 sec        LDH: 268  Hemoglobin A1C, Whole Blood: 5.2 % (10-15 @ 21:40)    Thyroid Stimulating Hormone, Serum: 1.67 uIU/mL (10-15 @ 21:40)

## 2017-10-17 NOTE — PROGRESS NOTE ADULT - PROBLEM SELECTOR PLAN 2
rthopedic surgery consult called: consult appreciated will follow as an outpatient  right knee xrays performed this am: + advanced osteoarthritis and moderate effusion-.

## 2017-10-17 NOTE — PROGRESS NOTE ADULT - PROBLEM SELECTOR PLAN 1
orthopedic surgery consult Dr Capps - no inpatient intervention warranted at this time  right knee xrays performed : + advanced osteoarthritis and moderate effusion- orthopedic surgery consult Dr Capps - no inpatient intervention warranted at this time  right knee xrays performed : + advanced osteoarthritis and moderate effusion-  Colchicine   Uric acid level in am

## 2017-10-17 NOTE — PROGRESS NOTE ADULT - ASSESSMENT
Endorsed right knee pain when walking (8-9 on scale of 10), at rest feels better (2-3).  Using Lidocaine patch for pain relief.  Ambulated with assistance in simms.   Appetite good.  Sleeping well.   Continues to develop confidence in his ability to monitor his health and manage LVAD.   Has strong support from brother and close friend and has been more willing to ask others for help and support.  Mood "I’m doing ok."      DX:  Systolic heart failure; r/o Adjustment disorder       Recommendations:   Behavioral Cardiology will continue to follow

## 2017-10-18 LAB
ALBUMIN SERPL ELPH-MCNC: 3.6 G/DL — SIGNIFICANT CHANGE UP (ref 3.3–5)
ALP SERPL-CCNC: 155 U/L — HIGH (ref 40–120)
ALT FLD-CCNC: 118 U/L RC — HIGH (ref 10–45)
ANION GAP SERPL CALC-SCNC: 15 MMOL/L — SIGNIFICANT CHANGE UP (ref 5–17)
APTT BLD: 29.4 SEC — SIGNIFICANT CHANGE UP (ref 27.5–37.4)
AST SERPL-CCNC: 120 U/L — HIGH (ref 10–40)
BILIRUB SERPL-MCNC: 0.2 MG/DL — SIGNIFICANT CHANGE UP (ref 0.2–1.2)
BUN SERPL-MCNC: 35 MG/DL — HIGH (ref 7–23)
CALCIUM SERPL-MCNC: 9.3 MG/DL — SIGNIFICANT CHANGE UP (ref 8.4–10.5)
CHLORIDE SERPL-SCNC: 101 MMOL/L — SIGNIFICANT CHANGE UP (ref 96–108)
CO2 SERPL-SCNC: 21 MMOL/L — LOW (ref 22–31)
CREAT SERPL-MCNC: 1.28 MG/DL — SIGNIFICANT CHANGE UP (ref 0.5–1.3)
GLUCOSE SERPL-MCNC: 106 MG/DL — HIGH (ref 70–99)
HCT VFR BLD CALC: 31.1 % — LOW (ref 39–50)
HGB BLD-MCNC: 10.2 G/DL — LOW (ref 13–17)
INR BLD: 1.13 RATIO — SIGNIFICANT CHANGE UP (ref 0.88–1.16)
LDH SERPL L TO P-CCNC: 311 U/L — HIGH (ref 50–242)
MCHC RBC-ENTMCNC: 29 PG — SIGNIFICANT CHANGE UP (ref 27–34)
MCHC RBC-ENTMCNC: 32.8 GM/DL — SIGNIFICANT CHANGE UP (ref 32–36)
MCV RBC AUTO: 88.2 FL — SIGNIFICANT CHANGE UP (ref 80–100)
PLATELET # BLD AUTO: 326 K/UL — SIGNIFICANT CHANGE UP (ref 150–400)
POTASSIUM SERPL-MCNC: 4.3 MMOL/L — SIGNIFICANT CHANGE UP (ref 3.5–5.3)
POTASSIUM SERPL-SCNC: 4.3 MMOL/L — SIGNIFICANT CHANGE UP (ref 3.5–5.3)
PROT SERPL-MCNC: 8.1 G/DL — SIGNIFICANT CHANGE UP (ref 6–8.3)
PROTHROM AB SERPL-ACNC: 12.2 SEC — SIGNIFICANT CHANGE UP (ref 9.8–12.7)
RBC # BLD: 3.53 M/UL — LOW (ref 4.2–5.8)
RBC # FLD: 17.6 % — HIGH (ref 10.3–14.5)
SODIUM SERPL-SCNC: 137 MMOL/L — SIGNIFICANT CHANGE UP (ref 135–145)
WBC # BLD: 11.5 K/UL — HIGH (ref 3.8–10.5)
WBC # FLD AUTO: 11.5 K/UL — HIGH (ref 3.8–10.5)

## 2017-10-18 RX ADMIN — MEXILETINE HYDROCHLORIDE 150 MILLIGRAM(S): 150 CAPSULE ORAL at 21:07

## 2017-10-18 RX ADMIN — Medication 1 APPLICATION(S): at 06:30

## 2017-10-18 RX ADMIN — Medication 0.6 MILLIGRAM(S): at 17:24

## 2017-10-18 RX ADMIN — CARVEDILOL PHOSPHATE 12.5 MILLIGRAM(S): 80 CAPSULE, EXTENDED RELEASE ORAL at 17:24

## 2017-10-18 RX ADMIN — Medication 100 MILLIGRAM(S): at 14:37

## 2017-10-18 RX ADMIN — Medication 100 MILLIGRAM(S): at 21:07

## 2017-10-18 RX ADMIN — MEXILETINE HYDROCHLORIDE 150 MILLIGRAM(S): 150 CAPSULE ORAL at 14:37

## 2017-10-18 RX ADMIN — MEXILETINE HYDROCHLORIDE 150 MILLIGRAM(S): 150 CAPSULE ORAL at 06:30

## 2017-10-18 RX ADMIN — Medication 1 APPLICATION(S): at 21:07

## 2017-10-18 RX ADMIN — QUETIAPINE FUMARATE 50 MILLIGRAM(S): 200 TABLET, FILM COATED ORAL at 11:44

## 2017-10-18 RX ADMIN — SPIRONOLACTONE 25 MILLIGRAM(S): 25 TABLET, FILM COATED ORAL at 06:30

## 2017-10-18 RX ADMIN — Medication 25 MILLIGRAM(S): at 21:07

## 2017-10-18 RX ADMIN — Medication 25 MILLIGRAM(S): at 14:37

## 2017-10-18 RX ADMIN — Medication 100 MILLIGRAM(S): at 06:30

## 2017-10-18 RX ADMIN — Medication 0.6 MILLIGRAM(S): at 06:30

## 2017-10-18 RX ADMIN — LIDOCAINE 1 PATCH: 4 CREAM TOPICAL at 02:40

## 2017-10-18 RX ADMIN — Medication 20 MILLIGRAM(S): at 11:45

## 2017-10-18 RX ADMIN — CARVEDILOL PHOSPHATE 12.5 MILLIGRAM(S): 80 CAPSULE, EXTENDED RELEASE ORAL at 06:30

## 2017-10-18 RX ADMIN — AMIODARONE HYDROCHLORIDE 200 MILLIGRAM(S): 400 TABLET ORAL at 06:30

## 2017-10-18 RX ADMIN — Medication 25 MILLIGRAM(S): at 06:30

## 2017-10-18 RX ADMIN — Medication 500 MILLIGRAM(S): at 11:44

## 2017-10-18 RX ADMIN — Medication 325 MILLIGRAM(S): at 11:44

## 2017-10-18 RX ADMIN — Medication 1 MILLIGRAM(S): at 11:45

## 2017-10-18 RX ADMIN — LIDOCAINE 1 PATCH: 4 CREAM TOPICAL at 11:44

## 2017-10-18 RX ADMIN — LIDOCAINE 1 PATCH: 4 CREAM TOPICAL at 23:30

## 2017-10-18 NOTE — CHART NOTE - NSCHARTNOTEFT_GEN_A_CORE
Upon Nutritional Assessment by the Registered Dietitian your patient was determined to meet criteria / has evidence of the following diagnosis/diagnoses:            [x] Severe Protein Calorie Malnutrition     Severe & Moderate Muscle Wasting, Mild Fat Loss, 4 pound weight loss in ~3 weeks    Findings as based on:  [x] Comprehensive nutrition assessment   [x] Nutrition Focused Physical Exam  [x] Other: medical record      Nutrition Plan/Recommendations:      1) Recommend liberalize diet to low sodium to allow for greater menu variety and optimize nutritional intake  2) Recommend Ensure Enlive 2x daily (700cal, 40 gm prot)  3) Provided diet education regarding Heart Failure     PROVIDER Section:     By signing this assessment you are acknowledging and agree with the diagnosis/diagnoses assigned by the Registered Dietitian    Comments:

## 2017-10-18 NOTE — DIETITIAN INITIAL EVALUATION ADULT. - PROBLEM SELECTOR PLAN 4
no coumadin x 3 mos.  PPI po bid  *NO ASA OR COUMADIN D/T GIB X 2 W/ CAUTERY POST-LVAD IMPLANT AS PER CHF TEAM

## 2017-10-18 NOTE — PROGRESS NOTE ADULT - SUBJECTIVE AND OBJECTIVE BOX
Subjective Hello oob in chair feeling better    VITAL SIGNS  Telemetry:  Sinus 1degree WCT 36 beats  Vital Signs Last 24 Hrs  T(C): 36.8 (10-18-17 @ 08:32), Max: 36.8 (10-17-17 @ 20:23)  T(F): 98.2 (10-18-17 @ 08:32), Max: 98.2 (10-17-17 @ 20:23)  HR: 80 (10-18-17 @ 08:32) (57 - 92)  BP: 104/80 (10-18-17 @ 08:32) (85/67 - 104/80)  RR: 18 (10-18-17 @ 08:32) (17 - 18)  SpO2: 97% (10-18-17 @ 08:32) (93% - 99%)         10-17 @ 07:01  -  10-18 @ 07:00  --------------------------------------------------------  IN: 1240 mL / OUT: 1850 mL / NET: -610 mL    10-18 @ 07:01  -  10-18 @ 12:47  --------------------------------------------------------  IN: 360 mL / OUT: 0 mL / NET: 360 mL  LVAD  speed 9200  flow 4.9  power 5.9  PI 6.7    Daily Weight in k.1 (18 Oct 2017 07:48  Coumadin on hold r/t GIB  MEDICATIONS  (STANDING):  amiodarone    Tablet 200 milliGRAM(s) Oral daily  ascorbic acid 500 milliGRAM(s) Oral daily  carvedilol 12.5 milliGRAM(s) Oral every 12 hours  colchicine 0.6 milliGRAM(s) Oral two times a day  docusate sodium 100 milliGRAM(s) Oral three times a day  ferrous    sulfate 325 milliGRAM(s) Oral daily  folic acid 1 milliGRAM(s) Oral daily  furosemide    Tablet 20 milliGRAM(s) Oral every other day  hydrALAZINE 25 milliGRAM(s) Oral every 8 hours  lidocaine   Patch 1 Patch Transdermal daily  mexiletine 150 milliGRAM(s) Oral every 8 hours  QUEtiapine 50 milliGRAM(s) Oral daily  spironolactone 25 milliGRAM(s) Oral daily    MEDICATIONS  (PRN):  hydrocortisone 1% Cream 1 Application(s) Topical two times a day PRN Rash and/or Itching  PHYSICAL EXAM  Neurology: alert and oriented x 3, nonfocal, no gross deficits  CV :LVAD    drive line CDI  Lungs: CTA  Abdomen: soft, nontender, nondistended, positive bowel sounds, last bowel movement 10/16  :   voiding          Extremities:  RT knee tender B/L LE warm well perfused Rt hand tender       Physical Therapy Rec:  pending plan    Discussed with Cardiothoracic Team at AM rounds.

## 2017-10-18 NOTE — PROGRESS NOTE ADULT - ATTENDING COMMENTS
Marga Martines, PhD  [phone]
Patient being d/c home today.  Ambularting.  Off ASA and coumadin one month.   Was evaluated but not listed due to mobility issues.  For d/c and follow up as outpatient at [facility] for completion of evaluation.   Marvin Campbell

## 2017-10-18 NOTE — DIETITIAN INITIAL EVALUATION ADULT. - OTHER INFO
Patient seen for LVAD initial assessment. Reports good appetite & PO intake during this admission. Per medical record, patient with % PO intake x6 meals during this admission. Reports NKFA. States he takes folic acid and vitamin C daily at home. Denies chewing/swallowing difficulty. Denies nausea/emesis. Last BM 10/17

## 2017-10-18 NOTE — DIETITIAN INITIAL EVALUATION ADULT. - ADHERENCE
Patient with PMH of chronic systolic heart failure S/P LVAD placement 6/12/2017. Patient with fair teach back on dietary recommendations regarding heart failure, reviewed diet education with patient. Patient previously taking coumadin but, as per heart failure team, will not be taking coumadin for 3 months due to GI bleed x2 S/P cautery. Patient with poor teach back on low sodium diet education, reviewed diet education with patient/fair

## 2017-10-18 NOTE — DIETITIAN INITIAL EVALUATION ADULT. - PERTINENT LABORATORY DATA
(10/18) CO2 21- low, BUN 35-high, - high, Alk Phos 155- high, - high, - high, lactate dehydrogenase 311- high, indirect reacting bilirubin 0.1- low, Hgb 10.2- low, Hct 31.1- low, Na 137, K 4.3, Cl 101, Cr 1.28, Ca 9.3, Total protein 8.1, Albumin 3.6, Bilirubin 0.2; (10/16) Mg 2.0; (10/15) T3 78- low, T4 10.3, Thyroid stimulating hormone 1.67, Prealbumin 26, HgbA1c 5.2%

## 2017-10-18 NOTE — PROGRESS NOTE ADULT - PROBLEM SELECTOR PLAN 1
orthopedic surgery consult Dr Capps - no inpatient intervention warranted at this time  right knee xrays performed : + advanced osteoarthritis and moderate effusion-  Colchicine   Uric acid level

## 2017-10-18 NOTE — DIETITIAN INITIAL EVALUATION ADULT. - NS FNS REASON FOR WEIGHT CHANG
Reports UBW of 150 pounds, patient unsure if he has lost weight recently. Per previous RD note from 9/27/2017, patient weighed 151 pounds. Patient with weight of 147 pounds on 10/16 during this admission- suggestive of 4 pound weight loss in ~3 weeks.

## 2017-10-18 NOTE — DIETITIAN INITIAL EVALUATION ADULT. - NUTRITION INTERVENTION
Nutrition Education/Collaboration and Referral of Nutrition Care/Meals and Snack/Medical Food Supplements Nutrition Education

## 2017-10-18 NOTE — DIETITIAN INITIAL EVALUATION ADULT. - NS AS NUTRI INTERV ED CONTENT3
Educated patient on dietary recommendations regarding Heart Failure. Reviewed food sources high in sodium, label reading for sodium content, alternatives for high sodium foods, and encouraged patient to use spices/herbs in place of salt. Encouraged patient to monitor weight daily for fluid retention and monitor fluid intake daily. Encouraged patient to contact MD if he notices weight gain of 2-3lbs. in one day, 5lbs. in two days, or weight trending upwards for several days in a row. Provided written materials on Nutrition Therapy for Heart Failure from [employer]

## 2017-10-18 NOTE — DIETITIAN INITIAL EVALUATION ADULT. - ORAL INTAKE PTA
Patient transferred from [facility] and before that was hospitalized at [facility]. Patient states that his PO intake was poor-fair at [facility] due to distaste for food. States he received Ensure intermittently at [facility] (approximately 3x). States that normally his appetite is good and that when he is at home he has an aide from 10am-2pm 5 days a week. Reports that aide prepares all his meals and is a good cook./fair

## 2017-10-18 NOTE — DIETITIAN INITIAL EVALUATION ADULT. - NS AS NUTRI INTERV ED CONTENT
Educated patient on dietary recommendations regarding Heart Failure. Reviewed food sources high in sodium, label reading for sodium content, alternatives for high sodium foods, and encouraged patient to use spices/herbs in place of salt. Encouraged patient to monitor weight daily for fluid retention and monitor fluid intake daily. Encouraged patient to contact MD if he notices weight gain of 2-3lbs. in one day, 5lbs. in two days, or weight trending upwards for several days in a row. Provided written materials on Nutrition Therapy for Heart Failure from [employer] Discussed importance of maintaining adequate protein-energy intake to prevent further weight loss and maintain lean body mass. Encouraged patient to drink Ensure supplement in between meals and focus on consuming good sources of HBV protein

## 2017-10-18 NOTE — PROGRESS NOTE ADULT - SUBJECTIVE AND OBJECTIVE BOX
HPI:  67 year old man with chronic systolic heart failure, ACC/AHA stage D, due to nonischemic cardiomyopathy, s/p LVAD 17, GIB s/p cautery 17 , known AV malformation, now  chronic anemia numerous GIBs now off AC recently transferred back to [facility] for evaluation for heart transplant pts candidacy rejected due to numerous factors one of them being requirement for further orthopedic work up and functional assessment . denies CP - driveline site intact (15 Oct 2017 16:06)    PMH:   GIB (gastrointestinal bleeding)  H/O prior ablation treatment  Ventricular fibrillation  PAF (paroxysmal atrial fibrillation)  Non-Ischemic Cardiomyopathy  SVT (Supraventricular Tachycardia)  HTN  CHF (Congestive Heart Failure)    PSH:   LVAD (left ventricular assist device) present  Status post left hip replacement  Hypertension  Hypertension  History of Prior Ablation Treatment  AICD (Automatic Cardioverter/Defibrillator) Present    Medications:   amiodarone    Tablet 200 milliGRAM(s) Oral daily  ascorbic acid 500 milliGRAM(s) Oral daily  carvedilol 12.5 milliGRAM(s) Oral every 12 hours  colchicine 0.6 milliGRAM(s) Oral two times a day  docusate sodium 100 milliGRAM(s) Oral three times a day  ferrous    sulfate 325 milliGRAM(s) Oral daily  folic acid 1 milliGRAM(s) Oral daily  furosemide    Tablet 20 milliGRAM(s) Oral every other day  hydrALAZINE 25 milliGRAM(s) Oral every 8 hours  hydrocortisone 1% Cream 1 Application(s) Topical two times a day PRN  lidocaine   Patch 1 Patch Transdermal daily  mexiletine 150 milliGRAM(s) Oral every 8 hours  QUEtiapine 50 milliGRAM(s) Oral daily  spironolactone 25 milliGRAM(s) Oral daily    Allergies:  No Known Allergies    FAMILY HISTORY:  No pertinent family history in first degree relatives    LVAD Interrogation: No alarms noted  Pump Flow: 4.5-4.8  Pump Speed: 9200  Pulse Index: 5.2-5.3  Pump Power: 6.2-6.9  VAD Events: none  Driveline evaluation:  C/D/I  Programming Changes: [X ] No changes made [ ] Changes made:            Review of Systems:  Constitutional: [ ] Fever [ ] Chills [ ] Fatigue [ ] Weight change   HEENT: [ ] Blurred vision [ ] Eye Pain [ ] Headache [ ] Runny nose [ ] Sore Throat   Respiratory: [ ] Cough [ ] Wheezing [ ] Shortness of breath  Cardiovascular: [ ] Chest Pain [ ] Palpitations [ ] LOBATO [ ] PND [ ] Orthopnea  Gastrointestinal: [ ] Abdominal Pain [ ] Diarrhea [ ] Constipation [ ] Hemorrhoids [ ] Nausea [ ] Vomiting  Genitourinary: [ ] Nocturia [ ] Dysuria [ ] Incontinence  Extremities: [ ] Swelling [ ] Joint Pain  Neurologic: [ ] Focal deficit [ ] Paresthesias [ ] Syncope  Lymphatic: [ ] Swelling [ ] Lymphadenopathy   Skin: [ ] Rash [ ] Ecchymoses [ ] Wounds [ ] Lesions  Psychiatry: [ ] Depression [ ] Suicidal/Homicidal Ideation [ ] Anxiety [ ] Sleep Disturbances  [X ] 10 point review of systems is otherwise negative except as mentioned above            [ ]Unable to obtain    Physical Exam:  T(C): 36.7 (10-18-17 @ 17:20), Max: 36.8 (10-17-17 @ 20:23)  HR: 83 (10-18-17 @ 17:20) (57 - 94)  BP: 95/70 (10-18-17 @ 17:20) (86/67 - 104/80)  RR: 18 (10-18-17 @ 12:30) (17 - 18)  SpO2: 96% (10-18-17 @ 12:30) (93% - 98%)  Wt(kg): --  I&O's Detail    17 Oct 2017 07:  -  18 Oct 2017 07:00  --------------------------------------------------------  IN:    Oral Fluid: 1240 mL  Total IN: 1240 mL    OUT:    Voided: 1850 mL  Total OUT: 1850 mL    Total NET: -610 mL      18 Oct 2017 07:  -  18 Oct 2017 19:26  --------------------------------------------------------  IN:    Oral Fluid: 700 mL  Total IN: 700 mL    OUT:    Voided: 800 mL  Total OUT: 800 mL    Total NET: -100 mL          Daily Weight in k.6 (18 Oct 2017 14:42), Weight in k.1 (18 Oct 2017 07:48), Weight in k.1 (17 Oct 2017 07:09), Weight in k.7 (16 Oct 2017 13:32)    Appearance: [ ] Normal [ ] NAD  Eyes: [ ] PERRL [ ] EOMI  HENT: [X ] Normal oral muscosa [ ]NC/AT  Cardiovascular: [ ] S1 [ ] S2 [ ] RRR [ ] No m/r/g [ ]No edema [ ] JVP+HUM OF VAD  Respiratory: [X ] Clear to auscultation bilaterally  Gastrointestinal: X[ ] Soft [X ] Non-tender [x ] Non-distended [X ] BS+  Musculoskeletal: [ ] No clubbing [ ] No joint deformity Right knee being assessed by ortho, to be done as outppatient  Neurologic: [X] Non-focal  Lymphatic: [X ] No lymphadenopathy  Psychiatry: [X ] AAOx3 [ ] Mood & affect appropriate  Skin: [X ] No rashes [ ] No ecchymoses [ ] No cyanosis    Labs:                        10.2   11.5  )-----------( 326      ( 18 Oct 2017 02:04 )             31.1     10-18    137  |  101  |  35<H>  ----------------------------<  106<H>  4.3   |  21<L>  |  1.28    Ca    9.3      18 Oct 2017 02:04    TPro  8.1  /  Alb  3.6  /  TBili  0.2  /  DBili  0.1  /  AST  120<H>  /  ALT  118<H>  /  AlkPhos  155<H>  10-18    PT/INR - ( 18 Oct 2017 02:04 )   PT: 12.2 sec;   INR: 1.13 ratio         PTT - ( 18 Oct 2017 02:04 )  PTT:29.4 sec      LDH: 311    Hemoglobin A1C, Whole Blood: 5.2 % (10-15 @ 21:40)    Thyroid Stimulating Hormone, Serum: 1.67 uIU/mL (10-15 @ 21:40)

## 2017-10-18 NOTE — PROGRESS NOTE ADULT - PROBLEM SELECTOR PLAN 4
continue Vad at 9200
continue Vad at 9200
no coumadin x 3 mos.  PPI po bid  *NO ASA OR COUMADIN D/T GIB X 2 W/ CAUTERY POST-LVAD IMPLANT AS PER CHF TEAM

## 2017-10-18 NOTE — PROGRESS NOTE ADULT - ASSESSMENT
67 year old man with chronic systolic heart failure, ACC/AHA stage D, due to nonischemic cardiomyopathy, s/p LVAD 6/12/17, GIB s/p cautery 7/25/17 , known AV malformation, now  chronic anemia numerous GIBs now off AC recently transferred back to [facility] for evaluation for heart transplant- pts candidacy rejected due to numerous factors one of them being requirement for further orthopedic work up and functional assessment . denies CP - driveline site intact  PMH of CHF, GIB, HTN, Non-Ischemic Cardiomyopathy, PAF, SVT, VT s/p AICD.  10/16 pt stable; CHF team following; right knee xrays performed this am: + advanced osteoarthritis and moderate effusion- await ortho consult  10/17 Seen by Ortho no inpatient intervention required- knee brace for comfort- uric acid level for am colchicine ordered  lidoderm patch ordered no coumadin 2 GIB  discharge planning?  10/18 [id number]     -  pending disposition per LVAD team 67 year old man with chronic systolic heart failure, ACC/AHA stage D, due to nonischemic cardiomyopathy, s/p LVAD 6/12/17, GIB s/p cautery 7/25/17 , known AV malformation, now  chronic anemia numerous GIBs now off AC recently transferred back to [facility] for evaluation for heart transplant- pts candidacy rejected due to numerous factors one of them being requirement for further orthopedic work up and functional assessment . denies CP - driveline site intact  PMH of CHF, GIB, HTN, Non-Ischemic Cardiomyopathy, PAF, SVT, VT s/p AICD.  10/16 pt stable; CHF team following; right knee xrays performed this am: + advanced osteoarthritis and moderate effusion- await ortho consult  10/17 Seen by Ortho no inpatient intervention required- knee brace for comfort- uric acid level for am colchicine ordered  lidoderm patch ordered no coumadin 2 GIB  discharge planning?  10/18 [id number]     -  pending disposition per LVAD team

## 2017-10-18 NOTE — PROGRESS NOTE ADULT - PROBLEM SELECTOR PLAN 1
Orthopedic surgery consult called: consult appreciated will follow as an outpatient  right knee xrays performed this am: + advanced osteoarthritis and moderate effusion-.  Brace to be ordered in am

## 2017-10-18 NOTE — DIETITIAN INITIAL EVALUATION ADULT. - PHYSICAL APPEARANCE
Patient appears thin. Patient consented to nutrition focused physical exam. Findings as follows: moderate muscle wasting from temples and clavicles, no muscle wasting from shoulders or interosseous muscles noted, severe muscle wasting from thigh and calf noted. Mild fat loss from ribs noted, no fat loss from orbitals or triceps noted

## 2017-10-18 NOTE — DIETITIAN INITIAL EVALUATION ADULT. - ENERGY NEEDS
Ht 68 inches Wt 147 pounds BMI 22.3 Kg/m^2   pounds +/- 10%; 95% IBW  Edema: 2+ right wrist & right knee edema Skin: no pressure ulcers   Other pertinent information: 66 yo male with PMH of chronic systolic heart failure S/P AICD, S/P LVAD (6/12/2017), paroxysmal A fib, GI bleed S/P cautery, AV malformation, chronic anemia, non-ischemic cardiomyopathy, osteoarthritis of right knee transferred back from [facility] after rejected candidacy for heart transplant secondary to need for further workup due to decreased mobility

## 2017-10-18 NOTE — PROGRESS NOTE ADULT - PROBLEM SELECTOR PROBLEM 4
LVAD (left ventricular assist device) present
GIB (gastrointestinal bleeding)
Gastrointestinal hemorrhage associated with chronic gastritis
LVAD (left ventricular assist device) present

## 2017-10-18 NOTE — DIETITIAN INITIAL EVALUATION ADULT. - SIGNS/SYMPTOMS
severe & moderate muscle wasting, mild fat loss, 2.6% wt loss in ~3 weeks, PMH of HF S/P LVAD poor-fair teach back on dietary recommendations

## 2017-10-18 NOTE — DIETITIAN INITIAL EVALUATION ADULT. - ETIOLOGY
increased demand for nutrients due to catabolic process limited retention of dietary recommendations regarding health and disease

## 2017-10-19 ENCOUNTER — TRANSCRIPTION ENCOUNTER (OUTPATIENT)
Age: 67
End: 2017-10-19

## 2017-10-19 VITALS
RESPIRATION RATE: 18 BRPM | OXYGEN SATURATION: 98 % | SYSTOLIC BLOOD PRESSURE: 103 MMHG | TEMPERATURE: 97 F | DIASTOLIC BLOOD PRESSURE: 83 MMHG | HEART RATE: 80 BPM

## 2017-10-19 LAB
ALBUMIN SERPL ELPH-MCNC: 3.3 G/DL — SIGNIFICANT CHANGE UP (ref 3.3–5)
ALP SERPL-CCNC: 134 U/L — HIGH (ref 40–120)
ALT FLD-CCNC: 113 U/L RC — HIGH (ref 10–45)
ANION GAP SERPL CALC-SCNC: 12 MMOL/L — SIGNIFICANT CHANGE UP (ref 5–17)
APTT BLD: 28.5 SEC — SIGNIFICANT CHANGE UP (ref 27.5–37.4)
AST SERPL-CCNC: 91 U/L — HIGH (ref 10–40)
BILIRUB SERPL-MCNC: 0.3 MG/DL — SIGNIFICANT CHANGE UP (ref 0.2–1.2)
BUN SERPL-MCNC: 27 MG/DL — HIGH (ref 7–23)
CALCIUM SERPL-MCNC: 9.2 MG/DL — SIGNIFICANT CHANGE UP (ref 8.4–10.5)
CHLORIDE SERPL-SCNC: 102 MMOL/L — SIGNIFICANT CHANGE UP (ref 96–108)
CO2 SERPL-SCNC: 20 MMOL/L — LOW (ref 22–31)
CREAT SERPL-MCNC: 1.09 MG/DL — SIGNIFICANT CHANGE UP (ref 0.5–1.3)
GLUCOSE SERPL-MCNC: 99 MG/DL — SIGNIFICANT CHANGE UP (ref 70–99)
HCT VFR BLD CALC: 29.2 % — LOW (ref 39–50)
HGB BLD-MCNC: 9.5 G/DL — LOW (ref 13–17)
INR BLD: 1.08 RATIO — SIGNIFICANT CHANGE UP (ref 0.88–1.16)
LDH SERPL L TO P-CCNC: 257 U/L — HIGH (ref 50–242)
MCHC RBC-ENTMCNC: 28.8 PG — SIGNIFICANT CHANGE UP (ref 27–34)
MCHC RBC-ENTMCNC: 32.4 GM/DL — SIGNIFICANT CHANGE UP (ref 32–36)
MCV RBC AUTO: 88.8 FL — SIGNIFICANT CHANGE UP (ref 80–100)
PLATELET # BLD AUTO: 297 K/UL — SIGNIFICANT CHANGE UP (ref 150–400)
POTASSIUM SERPL-MCNC: 4.4 MMOL/L — SIGNIFICANT CHANGE UP (ref 3.5–5.3)
POTASSIUM SERPL-SCNC: 4.4 MMOL/L — SIGNIFICANT CHANGE UP (ref 3.5–5.3)
PROT SERPL-MCNC: 7.3 G/DL — SIGNIFICANT CHANGE UP (ref 6–8.3)
PROTHROM AB SERPL-ACNC: 11.8 SEC — SIGNIFICANT CHANGE UP (ref 9.8–12.7)
RBC # BLD: 3.29 M/UL — LOW (ref 4.2–5.8)
RBC # FLD: 17.9 % — HIGH (ref 10.3–14.5)
SODIUM SERPL-SCNC: 134 MMOL/L — LOW (ref 135–145)
URATE SERPL-MCNC: 5.4 MG/DL — SIGNIFICANT CHANGE UP (ref 3.4–8.8)
WBC # BLD: 9.3 K/UL — SIGNIFICANT CHANGE UP (ref 3.8–10.5)
WBC # FLD AUTO: 9.3 K/UL — SIGNIFICANT CHANGE UP (ref 3.8–10.5)

## 2017-10-19 PROCEDURE — 99239 HOSP IP/OBS DSCHRG MGMT >30: CPT

## 2017-10-19 RX ORDER — COLCHICINE 0.6 MG
1 TABLET ORAL
Qty: 60 | Refills: 0 | OUTPATIENT
Start: 2017-10-19 | End: 2017-11-18

## 2017-10-19 RX ORDER — LIDOCAINE 4 G/100G
1 CREAM TOPICAL
Qty: 1 | Refills: 0 | OUTPATIENT
Start: 2017-10-19 | End: 2017-11-18

## 2017-10-19 RX ORDER — FERROUS SULFATE 325(65) MG
1 TABLET ORAL
Qty: 30 | Refills: 0 | OUTPATIENT
Start: 2017-10-19 | End: 2017-11-18

## 2017-10-19 RX ORDER — CARVEDILOL PHOSPHATE 80 MG/1
1 CAPSULE, EXTENDED RELEASE ORAL
Qty: 0 | Refills: 0 | COMMUNITY
Start: 2017-10-19

## 2017-10-19 RX ORDER — HYDRALAZINE HCL 50 MG
1 TABLET ORAL
Qty: 90 | Refills: 0 | OUTPATIENT
Start: 2017-10-19 | End: 2017-11-18

## 2017-10-19 RX ORDER — PANTOPRAZOLE SODIUM 20 MG/1
1 TABLET, DELAYED RELEASE ORAL
Qty: 0 | Refills: 0 | COMMUNITY

## 2017-10-19 RX ORDER — FUROSEMIDE 40 MG
1 TABLET ORAL
Qty: 15 | Refills: 0 | OUTPATIENT
Start: 2017-10-19 | End: 2017-11-18

## 2017-10-19 RX ADMIN — Medication 25 MILLIGRAM(S): at 05:57

## 2017-10-19 RX ADMIN — SPIRONOLACTONE 25 MILLIGRAM(S): 25 TABLET, FILM COATED ORAL at 05:57

## 2017-10-19 RX ADMIN — Medication 25 MILLIGRAM(S): at 13:37

## 2017-10-19 RX ADMIN — CARVEDILOL PHOSPHATE 12.5 MILLIGRAM(S): 80 CAPSULE, EXTENDED RELEASE ORAL at 17:02

## 2017-10-19 RX ADMIN — Medication 0.6 MILLIGRAM(S): at 05:57

## 2017-10-19 RX ADMIN — MEXILETINE HYDROCHLORIDE 150 MILLIGRAM(S): 150 CAPSULE ORAL at 13:37

## 2017-10-19 RX ADMIN — LIDOCAINE 1 PATCH: 4 CREAM TOPICAL at 11:22

## 2017-10-19 RX ADMIN — AMIODARONE HYDROCHLORIDE 200 MILLIGRAM(S): 400 TABLET ORAL at 05:57

## 2017-10-19 RX ADMIN — Medication 0.6 MILLIGRAM(S): at 17:02

## 2017-10-19 RX ADMIN — Medication 1 APPLICATION(S): at 05:57

## 2017-10-19 RX ADMIN — QUETIAPINE FUMARATE 50 MILLIGRAM(S): 200 TABLET, FILM COATED ORAL at 11:25

## 2017-10-19 RX ADMIN — Medication 100 MILLIGRAM(S): at 13:37

## 2017-10-19 RX ADMIN — Medication 325 MILLIGRAM(S): at 11:22

## 2017-10-19 RX ADMIN — MEXILETINE HYDROCHLORIDE 150 MILLIGRAM(S): 150 CAPSULE ORAL at 05:57

## 2017-10-19 RX ADMIN — Medication 500 MILLIGRAM(S): at 11:22

## 2017-10-19 RX ADMIN — Medication 1 MILLIGRAM(S): at 11:22

## 2017-10-19 RX ADMIN — Medication 100 MILLIGRAM(S): at 05:57

## 2017-10-19 RX ADMIN — CARVEDILOL PHOSPHATE 12.5 MILLIGRAM(S): 80 CAPSULE, EXTENDED RELEASE ORAL at 05:57

## 2017-10-19 NOTE — DISCHARGE NOTE ADULT - PLAN OF CARE
Complete Recovery 1. Daily Shower  2. Weight yourself daily and notify any weight gain greater than 2-3 pounds in 24 hours.  3. Regular diet - low fat, low cholesterol, no added salt.  4. Increase Activity as tolerated.

## 2017-10-19 NOTE — DISCHARGE NOTE ADULT - MEDICATION SUMMARY - MEDICATIONS TO TAKE
I will START or STAY ON the medications listed below when I get home from the hospital:    spironolactone 25 mg oral tablet  -- 1 tab(s) by mouth once a day  -- Indication: For diuretic    acetaminophen 325 mg oral tablet  -- 2 tab(s) by mouth every 6 hours, As needed, Moderate Pain (4 - 6)  -- Indication: For mild pain    amiodarone 200 mg oral tablet  -- 1 tab(s) by mouth once a day  -- Indication: For Antiarrythmic    mexiletine 150 mg oral capsule  -- 1 cap(s) by mouth every 8 hours  -- Indication: For Antiarrythmic    colchicine 0.6 mg oral tablet  -- 1 tab(s) by mouth 2 times a day  -- Indication: For Gout    QUEtiapine 50 mg oral tablet  -- 1 tab(s) by mouth once a day  -- Indication: For Anti-depressant    carvedilol 12.5 mg oral tablet  -- 1 tab(s) by mouth every 12 hours  -- Indication: For Heart rate    hydrocortisone 1% topical cream  -- 1 application on skin 2 times a day  -- Indication: For rash    lidocaine 5% topical film  -- Apply on skin to affected area once a day   -- Indication: For Pain    furosemide 20 mg oral tablet  -- 1 tab(s) by mouth every other day  -- Indication: For diuretic    ferrous sulfate 325 mg oral tablet  -- 1 tab(s) by mouth once a day  -- Check with your doctor before becoming pregnant.  Do not chew, break, or crush.  May discolor urine or feces.    -- Indication: For iron supplement    pantoprazole 40 mg oral delayed release tablet  -- 1 tab(s) by mouth once a day  -- Indication: For Gastric acid reducer    hydrALAZINE 25 mg oral tablet  -- 1 tab(s) by mouth every 8 hours  -- Indication: For blood pressure    ascorbic acid 500 mg oral tablet  -- 1 tab(s) by mouth once a day  -- Indication: For vitamin c supplement    folic acid 1 mg oral tablet  -- 1 tab(s) by mouth once a day  -- Indication: For folate supplement

## 2017-10-19 NOTE — PHYSICAL THERAPY INITIAL EVALUATION ADULT - PERTINENT HX OF CURRENT PROBLEM, REHAB EVAL
66y/o M s/p LVAD 6/12/17, GIB s/p cautery 7/25/17 , known AV malformation, now  chronic anemia numerous GIBs now off AC recently transferred back to [facility] for evaluation for heart transplant pts candidacy rejected due to numerous factors one of them being requirement for further orthopedic work up and functional assessment . denies CP - driveline site intact.  (cont below)

## 2017-10-19 NOTE — DISCHARGE NOTE ADULT - NSFTFSERV1RD_GEN_ALL_CORE
medication teaching and assessment/wound care and assessment/observation and assessment/teaching and training

## 2017-10-19 NOTE — DISCHARGE NOTE ADULT - CARE PLAN
Principal Discharge DX:	Osteoarthritis of right knee  Goal:	Complete Recovery  Instructions for follow-up, activity and diet:	1. Daily Shower  2. Weight yourself daily and notify any weight gain greater than 2-3 pounds in 24 hours.  3. Regular diet - low fat, low cholesterol, no added salt.  4. Increase Activity as tolerated.

## 2017-10-19 NOTE — CHART NOTE - NSCHARTNOTEFT_GEN_A_CORE
Fit and apply neoprene hinged wrap around knee orthosis RLE. Reviewed application skin precautions and care. Medial upright contoured due to varus deformity. Written instruction given to patient. To notify office with any issues questions or concerns.  Colton HERMOSILLO  [facility]  [phone]

## 2017-10-19 NOTE — DISCHARGE NOTE ADULT - REASON FOR ADMISSION
transferred back from [facility] after rejected candidacy for heart transplant tx for further work  up Transferred back from [facility] after rejected heart transplant candidacy requiring further orthopedic work  up

## 2017-10-19 NOTE — DISCHARGE NOTE ADULT - HOME CARE AGENCY
[facility] [phone] for RN assessment and eval for HHA, start of care the day after discharge

## 2017-10-19 NOTE — DISCHARGE NOTE ADULT - HOSPITAL COURSE
67 year old man with chronic systolic heart failure, ACC/AHA stage D, due to nonischemic cardiomyopathy, s/p LVAD 6/12/17, GIB s/p cautery 7/25/17 , known AV malformation, now  chronic anemia numerous GIBs now off AC recently transferred back to [facility] for evaluation for heart transplant- pts candidacy rejected due to numerous factors one of them being requirement for further orthopedic work up and functional assessment . denies CP - driveline site intact  PMH of CHF, GIB, HTN, Non-Ischemic Cardiomyopathy, PAF, SVT, VT s/p AICD.  10/16 pt stable; CHF team following; right knee xrays performed this am: + advanced osteoarthritis and moderate effusion- await ortho consult  10/17 Seen by Ortho no inpatient intervention required- knee brace for comfort- uric acid level for am colchicine ordered  lidoderm patch ordered no coumadin 2 GIB  discharge planning?  10/18 [id number]     -  pending disposition per LVAD team   10/19 LFTs trending down, cleared by PT for discharge-recommend home with PT. Discharged home per HF team.

## 2017-10-19 NOTE — PHYSICAL THERAPY INITIAL EVALUATION ADULT - ADDITIONAL COMMENTS
(cont from above):XRayRKnee: Advanced tricompartmental osteoarthritis with associated bony remodeling/resorption of the medial tibial plateau and with large tricompartmental osteophytes. There are loose bodies scattered throughout the joint and there is a moderate-sized joint effusion.CXR(-) (cont from above):XRayRKnee: Advanced tricompartmental osteoarthritis with associated bony remodeling/resorption of the medial tibial plateau and with large tricompartmental osteophytes. There are loose bodies scattered throughout the joint and there is a moderate-sized joint effusion.CXR(-)    Pt lives in a private home with 3 steps to enter with brother. Pt uses RW to ambulate and straight cane on stairs. Pt states brother and "lady friend" assist him as needed. Pt has HHA 4h/day 5days/week.

## 2017-10-19 NOTE — DISCHARGE NOTE ADULT - PATIENT PORTAL LINK FT
“You can access the Follow[facility] Patient Portal, offered by [facility], by registering with the following website: [email or web address]”

## 2017-10-19 NOTE — DISCHARGE NOTE ADULT - OTHER SIGNIFICANT FINDINGS
PHYSICAL EXAM  Neurology: alert and oriented x 3, nonfocal, no gross deficits  CV: +LVAD Hum  Lungs: Respirations non-labored, B/L BS CTA  Abdomen: soft, nontender, nondistended, positive bowel sounds, last bowel movement 10/16   drive line incision w/ DSD CDI  :   voiding          Extremities:  RT knee tender B/L LE warm well perfused

## 2017-10-19 NOTE — DISCHARGE NOTE ADULT - CARE PROVIDER_API CALL
Neva Chand (NP; RN), NP in Adult Health  [address] [place] [id number]  Phone: [phone]  Fax: [phone]    Taye Vines), Orthopaedic Surgery  [address] [place] [address]  Phone: [phone]  Fax: [phone]

## 2017-10-19 NOTE — DISCHARGE NOTE ADULT - ADDITIONAL INSTRUCTIONS
Follow up with Neva Chand at heart failure/LVAD clinic at [facility] on Thursday 11/2/17 at 9am. Call [phone] to confirm appointment.  Follow up with orthopedic surgeon Dr. Vines for knee evaluation. Call [phone] to schedule appointment.

## 2017-10-19 NOTE — DISCHARGE NOTE ADULT - MEDICATION SUMMARY - MEDICATIONS TO STOP TAKING
I will STOP taking the medications listed below when I get home from the hospital:    octreotide  -- 100 microgram(s) subcutaneous 3 times a day

## 2017-10-26 ENCOUNTER — RX RENEWAL (OUTPATIENT)
Age: 67
End: 2017-10-26

## 2017-10-27 ENCOUNTER — RESULT REVIEW (OUTPATIENT)
Age: 67
End: 2017-10-27

## 2017-10-30 ENCOUNTER — INPATIENT (INPATIENT)
Facility: HOSPITAL | Age: 67
LOS: 3 days | Discharge: ANOTHER HOSPITAL | DRG: 378 | End: 2017-11-03
Attending: THORACIC SURGERY (CARDIOTHORACIC VASCULAR SURGERY) | Admitting: THORACIC SURGERY (CARDIOTHORACIC VASCULAR SURGERY)
Payer: MEDICARE

## 2017-10-30 VITALS — RESPIRATION RATE: 18 BRPM | TEMPERATURE: 97 F | OXYGEN SATURATION: 100 %

## 2017-10-30 DIAGNOSIS — D64.9 ANEMIA, UNSPECIFIED: ICD-10-CM

## 2017-10-30 DIAGNOSIS — Z95.811 PRESENCE OF HEART ASSIST DEVICE: Chronic | ICD-10-CM

## 2017-10-30 LAB
ALBUMIN SERPL ELPH-MCNC: 3.6 G/DL — SIGNIFICANT CHANGE UP (ref 3.3–5)
ALP SERPL-CCNC: 89 U/L — SIGNIFICANT CHANGE UP (ref 40–120)
ALT FLD-CCNC: 49 U/L RC — HIGH (ref 10–45)
ANION GAP SERPL CALC-SCNC: 16 MMOL/L — SIGNIFICANT CHANGE UP (ref 5–17)
ANISOCYTOSIS BLD QL: SLIGHT — SIGNIFICANT CHANGE UP
AST SERPL-CCNC: 55 U/L — HIGH (ref 10–40)
BASOPHILS # BLD AUTO: 0 K/UL — SIGNIFICANT CHANGE UP (ref 0–0.2)
BILIRUB SERPL-MCNC: 0.4 MG/DL — SIGNIFICANT CHANGE UP (ref 0.2–1.2)
BLD GP AB SCN SERPL QL: NEGATIVE — SIGNIFICANT CHANGE UP
BUN SERPL-MCNC: 20 MG/DL — SIGNIFICANT CHANGE UP (ref 7–23)
CALCIUM SERPL-MCNC: 9 MG/DL — SIGNIFICANT CHANGE UP (ref 8.4–10.5)
CHLORIDE SERPL-SCNC: 101 MMOL/L — SIGNIFICANT CHANGE UP (ref 96–108)
CO2 SERPL-SCNC: 19 MMOL/L — LOW (ref 22–31)
CREAT SERPL-MCNC: 1.24 MG/DL — SIGNIFICANT CHANGE UP (ref 0.5–1.3)
ELLIPTOCYTES BLD QL SMEAR: SLIGHT — SIGNIFICANT CHANGE UP
EOSINOPHIL # BLD AUTO: 0.1 K/UL — SIGNIFICANT CHANGE UP (ref 0–0.5)
EOSINOPHIL NFR BLD AUTO: 1 % — SIGNIFICANT CHANGE UP (ref 0–6)
GIANT PLATELETS BLD QL SMEAR: PRESENT — SIGNIFICANT CHANGE UP
GLUCOSE SERPL-MCNC: 83 MG/DL — SIGNIFICANT CHANGE UP (ref 70–99)
HCT VFR BLD CALC: 19.6 % — CRITICAL LOW (ref 39–50)
HGB BLD-MCNC: 6 G/DL — CRITICAL LOW (ref 13–17)
HYPOCHROMIA BLD QL: SIGNIFICANT CHANGE UP
LG PLATELETS BLD QL AUTO: SLIGHT — SIGNIFICANT CHANGE UP
LYMPHOCYTES # BLD AUTO: 1.3 K/UL — SIGNIFICANT CHANGE UP (ref 1–3.3)
LYMPHOCYTES # BLD AUTO: 12 % — LOW (ref 13–44)
MACROCYTES BLD QL: SLIGHT — SIGNIFICANT CHANGE UP
MCHC RBC-ENTMCNC: 28.8 PG — SIGNIFICANT CHANGE UP (ref 27–34)
MCHC RBC-ENTMCNC: 30.8 GM/DL — LOW (ref 32–36)
MCV RBC AUTO: 93.6 FL — SIGNIFICANT CHANGE UP (ref 80–100)
MICROCYTES BLD QL: SLIGHT — SIGNIFICANT CHANGE UP
MONOCYTES # BLD AUTO: 0.9 K/UL — SIGNIFICANT CHANGE UP (ref 0–0.9)
MONOCYTES NFR BLD AUTO: 9 % — SIGNIFICANT CHANGE UP (ref 2–14)
NEUTROPHILS # BLD AUTO: 7.9 K/UL — HIGH (ref 1.8–7.4)
NEUTROPHILS NFR BLD AUTO: 78 % — HIGH (ref 43–77)
OVALOCYTES BLD QL SMEAR: SLIGHT — SIGNIFICANT CHANGE UP
PLAT MORPH BLD: NORMAL — SIGNIFICANT CHANGE UP
PLATELET # BLD AUTO: 261 K/UL — SIGNIFICANT CHANGE UP (ref 150–400)
POIKILOCYTOSIS BLD QL AUTO: SLIGHT — SIGNIFICANT CHANGE UP
POLYCHROMASIA BLD QL SMEAR: SLIGHT — SIGNIFICANT CHANGE UP
POTASSIUM SERPL-MCNC: 4.4 MMOL/L — SIGNIFICANT CHANGE UP (ref 3.5–5.3)
POTASSIUM SERPL-SCNC: 4.4 MMOL/L — SIGNIFICANT CHANGE UP (ref 3.5–5.3)
PROT SERPL-MCNC: 7.3 G/DL — SIGNIFICANT CHANGE UP (ref 6–8.3)
RBC # BLD: 2.1 M/UL — LOW (ref 4.2–5.8)
RBC # FLD: 22.4 % — HIGH (ref 10.3–14.5)
RBC BLD AUTO: ABNORMAL
RH IG SCN BLD-IMP: POSITIVE — SIGNIFICANT CHANGE UP
SCHISTOCYTES BLD QL AUTO: SLIGHT — SIGNIFICANT CHANGE UP
SODIUM SERPL-SCNC: 136 MMOL/L — SIGNIFICANT CHANGE UP (ref 135–145)
SPHEROCYTES BLD QL SMEAR: SLIGHT — SIGNIFICANT CHANGE UP
WBC # BLD: 10.2 K/UL — SIGNIFICANT CHANGE UP (ref 3.8–10.5)
WBC # FLD AUTO: 10.2 K/UL — SIGNIFICANT CHANGE UP (ref 3.8–10.5)

## 2017-10-30 PROCEDURE — 99284 EMERGENCY DEPT VISIT MOD MDM: CPT | Mod: GC

## 2017-10-30 NOTE — ED ADULT NURSE REASSESSMENT NOTE - NS ED NURSE REASSESS COMMENT FT1
Pt. tolerated blood transfusion well. Pt. denies adverse reaction. Safety maintained. Call bell at reach

## 2017-10-30 NOTE — ED PROVIDER NOTE - ATTENDING CONTRIBUTION TO CARE
Patient with LVAD, off AC due to recurrent GI bleeding, has been having dark stools associated with decreased exercise tolerance/LOBATO, saw LVAD team today and found to have hemoglobin in 5's -sent to ED for transfusion and admission.  Denying chest pains, no alarming from LVAD, no fevers.    On exam patient non toxic appearing, LVAD drive noises ascultated, lungs clear.  Conjunctival pallor.    Will repeat labs, INR, T&S, start transfusion, discuss with LVAD team for admission.

## 2017-10-30 NOTE — ED PROVIDER NOTE - OBJECTIVE STATEMENT
67 y.o. male with LVAD not on anticoagulation due to hx of GI bleed pw dark tary stools x 1 day. Also with exertional dyspnea and fatigue for past couple of days. Called LVAD team and told to come to ER for possible transfusion. No chest pain.

## 2017-10-30 NOTE — ED PROVIDER NOTE - PHYSICAL EXAMINATION
Gen: Well appearing, NAD, AOx3  Head: NCAT  HEENT:  MMM, normal conjunctiva  Lung: CTAB, no rales, rhonchi or wheezing  CV: S1/S2, no murmurs, rubs or gallops  Abd: soft, NTND, no rebound or guarding  MSK: No edema, no visible deformities  Neuro: No focal neurologic deficits.   Skin: Warm and dry, no evidence of rash  Psych: normal mood and affect

## 2017-10-30 NOTE — ED PROVIDER NOTE - PROGRESS NOTE DETAILS
***ATTENDING ADDENDUM (Dr. Sander Casanova): I have received handoff from LUCY Clark. Patient has been admitted for anemia and PRBC transfusion to Cardiothoracic Surgery service (re: LVAD). Neva is point-of-contact for LVAD team: [phone]. Will continue to observe and monitor closely until definitive placement is made.

## 2017-10-30 NOTE — ED PROVIDER NOTE - PSH
AICD (Automatic Cardioverter/Defibrillator) Present  [employer] [place] with 1 [employer] [place] lead4/1/09- explanted and replaced with [employer] 2 leads on 9/2/09  History of Prior Ablation Treatment  for afib  LVAD (left ventricular assist device) present    Status post left hip replacement

## 2017-10-30 NOTE — ED ADULT NURSE REASSESSMENT NOTE - NS ED NURSE REASSESS COMMENT FT1
1 unit RBC given. Consent in chart. Risks and benefits explained to patient. Patient verbalized understanding of risks and benefits. Patient aware of possible side effects. Vital signs stable. Second RN at bedside for confirmation.

## 2017-10-30 NOTE — ED PROVIDER NOTE - NS ED ROS FT
ROS: denies HA, weakness, dizziness, fevers/chills, nausea/vomiting, chest pain, diaphoresis, abdominal pain, back/neck pain, dysuria/hematuria, or rash  +dark stools

## 2017-10-30 NOTE — ED PROVIDER NOTE - MEDICAL DECISION MAKING DETAILS
67 y.o. male pw dark tary stools, sent in by LVAD team. Will obtain labs, type and screen, LVAD team for probable admission.

## 2017-10-30 NOTE — CONSULT NOTE ADULT - SUBJECTIVE AND OBJECTIVE BOX
CHIEF COMPLAINT: Anemia    HISTORY OF PRESENT ILLNESS: The Pt is a 66 y/o man with h/o Chronic Systolic Heart Failure (ACC/AHA Stage D) due to NICMP s/p LVAD 6/12/17, GIB s/p Cautery (7/25/2017), known AV Malformations, now Chronic Anemia due to numerous GIBs (off AC), A-Fib, VT s/p AICD who presented to the ED after he was found to have recurrent Acute on Chronic Anemia. The patient reports exertional dyspnea and lightheadedness for several days, and dark stool for the past day. Otherwise, he is without acute complaints.       Allergies    No Known Allergies    Intolerances    	    MEDICATIONS:    amiodarone    Tablet 200 milliGRAM(s) Oral daily  ascorbic acid 500 milliGRAM(s) Oral daily  carvedilol 12.5 milliGRAM(s) Oral every 12 hours  colchicine 0.6 milliGRAM(s) Oral two times a day  docusate sodium 100 milliGRAM(s) Oral three times a day  ferrous    sulfate 325 milliGRAM(s) Oral daily  folic acid 1 milliGRAM(s) Oral daily  furosemide    Tablet 20 milliGRAM(s) Oral every other day  hydrALAZINE 25 milliGRAM(s) Oral every 8 hours  hydrocortisone 1% Cream 1 Application(s) Topical two times a day PRN  lidocaine   Patch 1 Patch Transdermal daily  mexiletine 150 milliGRAM(s) Oral every 8 hours  QUEtiapine 50 milliGRAM(s) Oral daily  spironolactone 25 milliGRAM(s) Oral daily        PAST MEDICAL & SURGICAL HISTORY:  GIB (gastrointestinal bleeding)  Ventricular fibrillation: s/p AICD  PAF (paroxysmal atrial fibrillation): on xarelto  Non-Ischemic Cardiomyopathy  SVT (Supraventricular Tachycardia)  HTN  CHF (Congestive Heart Failure)  LVAD (left ventricular assist device) present  Status post left hip replacement  History of Prior Ablation Treatment: for afib  AICD (Automatic Cardioverter/Defibrillator) Present: [employer] with 1 [employer] lead4/1/09- explanted and replaced with [employer]tronic 2 leads on 9/2/09      FAMILY HISTORY:  No pertinent family history in first degree relatives      SOCIAL HISTORY: lives with brother      REVIEW OF SYSTEMS:  General: +fatigue/malaise, weight loss/gain.  Skin: no rashes.  Ophthalmologic: no blurred vision, no loss of vision. 	  ENT: no sore throat, rhinorrhea, sinus congestion.  Respiratory: +SOB  Gastrointestinal:  +melena  Genitourinary: no dysuria/hesitancy or hematuria.  Musculoskeletal: no myalgias or arthralgias.  Neurological: no changes in vision or hearing, no lightheadedness/dizziness, no syncope/near syncope	  Psychiatric: no unusual stress/anxiety.   Hematology/Lymphatics: no unusual bleeding, bruising and no lymphadenopathy.  Endocrine: no unusual thirst.   All others negative except as stated above and in HPI.    PHYSICAL EXAM:  T(C): 36.6 (10-30-17 @ 23:18), Max: 36.6 (10-30-17 @ 23:18)  HR: --  BP: --  RR: 18 (10-30-17 @ 23:18) (18 - 18)  SpO2: 100% (10-30-17 @ 23:18) (100% - 100%)  Wt(kg): --  I&O's Summary      Appearance: Normal	  HEENT:   Normal oral mucosa, PERRL, EOMI	  Cardiovascular: +LVAD sounds  Respiratory: Lungs clear to auscultation	  Psychiatry: A & O x 3, Mood & affect appropriate  Gastrointestinal:  Soft, Non-tender, + BS	  Skin: No rashes, No ecchymoses, No cyanosis	  Neurologic: Non-focal  Extremities: Normal range of motion, No clubbing, cyanosis or edema      LABS:	 	    CBC Full  -  ( 30 Oct 2017 21:35 )  WBC Count : 10.2 K/uL  Hemoglobin : 6.0 g/dL  Hematocrit : 19.6 %  Platelet Count - Automated : 261 K/uL  Mean Cell Volume : 93.6 fl  Mean Cell Hemoglobin : 28.8 pg  Mean Cell Hemoglobin Concentration : 30.8 gm/dL  Auto Neutrophil # : 7.9 K/uL  Auto Lymphocyte # : 1.3 K/uL  Auto Monocyte # : 0.9 K/uL  Auto Eosinophil # : 0.1 K/uL  Auto Basophil # : 0.0 K/uL  Auto Neutrophil % : 78.0 %  Auto Lymphocyte % : 12.0 %  Auto Monocyte % : 9.0 %  Auto Eosinophil % : 1.0 %  Auto Basophil % : x    10-30    136  |  101  |  20  ----------------------------<  83  4.4   |  19<L>  |  1.24    Ca    9.0      30 Oct 2017 21:35    TPro  7.3  /  Alb  3.6  /  TBili  0.4  /  DBili  x   /  AST  55<H>  /  ALT  49<H>  /  AlkPhos  89  10-30      ECG:  	    PREVIOUS DIAGNOSTIC TESTING:      Echocardiogram:    < from: Transthoracic Echocardiogram (10.02.17 @ 15:31) >  Conclusions:  1. Left ventricular enlargement.  2. Severe global left ventricular systolic dysfunction. The  LVAD cannula is seen in the LV apex. Laminar color Doppler  flow is seen in the cannula with velocities of less than 1  m/sec. The septum appears to be midline.  3. Right ventricular enlargement with decreased right  ventricular systolic function. A device wire is noted in  the right heart.  4. Estimated pulmonary artery systolic pressure equals 27  mm Hg, assuming right atrial pressure equals 8  mm Hg,  consistent with normal pulmonary pressures.  	  ASSESSMENT/PLAN:   66 y/o man with h/o Chronic Systolic Heart Failure (ACC/AHA Stage D) due to NICMP s/p LVAD 6/12/17, GIB s/p Cautery (7/25/2017), known AV Malformations, now Chronic Anemia due to numerous GIBs (off AC), A-Fib, VT s/p AICD who presented to the ED after he was found to have recurrent Acute on Chronic Anemia    1) Acute on Chronic Anemia - likely due to recurrent GI Bleed   - agree with pRBC transfusion  - continue PPI  - may need repeat endoscopic evaluation  - continue to hold AC for now    2) NICMP s/p LVAD - stable, not currently a transplant candidate due Orthopedic issues  - continue current outpatient regimen  - will watch volume status closely given need for pRBC transfusion CHIEF COMPLAINT: Anemia    HISTORY OF PRESENT ILLNESS: The Pt is a 66 y/o man with h/o Chronic Systolic Heart Failure (ACC/AHA Stage D) due to NICMP s/p LVAD 6/12/17, GIB s/p Cautery (7/25/2017), known AV Malformations, now Chronic Anemia due to numerous GIBs (off AC), A-Fib, VT s/p AICD who presented to the ED after he was found to have recurrent Acute on Chronic Anemia. The patient reports exertional dyspnea and lightheadedness for several days, and dark stool for the past day. Otherwise, he is without acute complaints.       Allergies    No Known Allergies    Intolerances    	    MEDICATIONS:    amiodarone    Tablet 200 milliGRAM(s) Oral daily  ascorbic acid 500 milliGRAM(s) Oral daily  carvedilol 12.5 milliGRAM(s) Oral every 12 hours  colchicine 0.6 milliGRAM(s) Oral two times a day  docusate sodium 100 milliGRAM(s) Oral three times a day  ferrous    sulfate 325 milliGRAM(s) Oral daily  folic acid 1 milliGRAM(s) Oral daily  furosemide    Tablet 20 milliGRAM(s) Oral every other day  hydrALAZINE 25 milliGRAM(s) Oral every 8 hours  hydrocortisone 1% Cream 1 Application(s) Topical two times a day PRN  lidocaine   Patch 1 Patch Transdermal daily  mexiletine 150 milliGRAM(s) Oral every 8 hours  QUEtiapine 50 milliGRAM(s) Oral daily  spironolactone 25 milliGRAM(s) Oral daily        PAST MEDICAL & SURGICAL HISTORY:  GIB (gastrointestinal bleeding)  Ventricular fibrillation: s/p AICD  PAF (paroxysmal atrial fibrillation): on xarelto  Non-Ischemic Cardiomyopathy  SVT (Supraventricular Tachycardia)  HTN  CHF (Congestive Heart Failure)  LVAD (left ventricular assist device) present  Status post left hip replacement  History of Prior Ablation Treatment: for afib  AICD (Automatic Cardioverter/Defibrillator) Present: [employer] with 1 [employer] lead4/1/09- explanted and replaced with Medtronic 2 leads on 9/2/09      FAMILY HISTORY:  No pertinent family history in first degree relatives      SOCIAL HISTORY: lives with brother      REVIEW OF SYSTEMS:  General: +fatigue/malaise, weight loss/gain.  Skin: no rashes.  Ophthalmologic: no blurred vision, no loss of vision. 	  ENT: no sore throat, rhinorrhea, sinus congestion.  Respiratory: +SOB  Gastrointestinal:  +melena  Genitourinary: no dysuria/hesitancy or hematuria.  Musculoskeletal: no myalgias or arthralgias.  Neurological: no changes in vision or hearing, no lightheadedness/dizziness, no syncope/near syncope	  Psychiatric: no unusual stress/anxiety.   Hematology/Lymphatics: no unusual bleeding, bruising and no lymphadenopathy.  Endocrine: no unusual thirst.   All others negative except as stated above and in HPI.    PHYSICAL EXAM:  T(C): 36.6 (10-30-17 @ 23:18), Max: 36.6 (10-30-17 @ 23:18)  HR: --  BP: --  RR: 18 (10-30-17 @ 23:18) (18 - 18)  SpO2: 100% (10-30-17 @ 23:18) (100% - 100%)  Wt(kg): --  I&O's Summary      Appearance: Normal	  HEENT:   Normal oral mucosa, PERRL, EOMI	  Cardiovascular: +LVAD sounds  Respiratory: Lungs clear to auscultation	  Psychiatry: A & O x 3, Mood & affect appropriate  Gastrointestinal:  Soft, Non-tender, + BS	  Skin: No rashes, No ecchymoses, No cyanosis	  Neurologic: Non-focal  Extremities: Normal range of motion, No clubbing, cyanosis or edema      LABS:	 	    CBC Full  -  ( 30 Oct 2017 21:35 )  WBC Count : 10.2 K/uL  Hemoglobin : 6.0 g/dL  Hematocrit : 19.6 %  Platelet Count - Automated : 261 K/uL  Mean Cell Volume : 93.6 fl  Mean Cell Hemoglobin : 28.8 pg  Mean Cell Hemoglobin Concentration : 30.8 gm/dL  Auto Neutrophil # : 7.9 K/uL  Auto Lymphocyte # : 1.3 K/uL  Auto Monocyte # : 0.9 K/uL  Auto Eosinophil # : 0.1 K/uL  Auto Basophil # : 0.0 K/uL  Auto Neutrophil % : 78.0 %  Auto Lymphocyte % : 12.0 %  Auto Monocyte % : 9.0 %  Auto Eosinophil % : 1.0 %  Auto Basophil % : x    10-30    136  |  101  |  20  ----------------------------<  83  4.4   |  19<L>  |  1.24    Ca    9.0      30 Oct 2017 21:35    TPro  7.3  /  Alb  3.6  /  TBili  0.4  /  DBili  x   /  AST  55<H>  /  ALT  49<H>  /  AlkPhos  89  10-30    PREVIOUS DIAGNOSTIC TESTING:      Echocardiogram:    < from: Transthoracic Echocardiogram (10.02.17 @ 15:31) >  Conclusions:  1. Left ventricular enlargement.  2. Severe global left ventricular systolic dysfunction. The  LVAD cannula is seen in the LV apex. Laminar color Doppler  flow is seen in the cannula with velocities of less than 1  m/sec. The septum appears to be midline.  3. Right ventricular enlargement with decreased right  ventricular systolic function. A device wire is noted in  the right heart.  4. Estimated pulmonary artery systolic pressure equals 27  mm Hg, assuming right atrial pressure equals 8  mm Hg,  consistent with normal pulmonary pressures.  	  ASSESSMENT/PLAN:   66 y/o man with h/o Chronic Systolic Heart Failure (ACC/AHA Stage D) due to NICMP s/p LVAD 6/12/17, GIB s/p Cautery (7/25/2017), known AV Malformations, now Chronic Anemia due to numerous GIBs (off AC), A-Fib, VT s/p AICD who presented to the ED after he was found to have recurrent Acute on Chronic Anemia    1) Acute on Chronic Anemia - likely due to recurrent GI Bleed   - agree with pRBC transfusion  - continue PPI  - may need repeat endoscopic evaluation  - continue to hold AC for now    2) NICMP s/p LVAD - stable, not currently a transplant candidate due Orthopedic issues  - continue current outpatient regimen  - will watch volume status closely given need for pRBC transfusion

## 2017-10-31 DIAGNOSIS — K92.1 MELENA: ICD-10-CM

## 2017-10-31 DIAGNOSIS — I42.8 OTHER CARDIOMYOPATHIES: ICD-10-CM

## 2017-10-31 DIAGNOSIS — Z95.811 PRESENCE OF HEART ASSIST DEVICE: ICD-10-CM

## 2017-10-31 LAB
ALBUMIN SERPL ELPH-MCNC: 3.4 G/DL — SIGNIFICANT CHANGE UP (ref 3.3–5)
ALP SERPL-CCNC: 90 U/L — SIGNIFICANT CHANGE UP (ref 40–120)
ALT FLD-CCNC: 46 U/L RC — HIGH (ref 10–45)
ANION GAP SERPL CALC-SCNC: 13 MMOL/L — SIGNIFICANT CHANGE UP (ref 5–17)
APTT BLD: 27.7 SEC — SIGNIFICANT CHANGE UP (ref 27.5–37.4)
AST SERPL-CCNC: 43 U/L — HIGH (ref 10–40)
BILIRUB SERPL-MCNC: 0.9 MG/DL — SIGNIFICANT CHANGE UP (ref 0.2–1.2)
BUN SERPL-MCNC: 20 MG/DL — SIGNIFICANT CHANGE UP (ref 7–23)
CALCIUM SERPL-MCNC: 8.9 MG/DL — SIGNIFICANT CHANGE UP (ref 8.4–10.5)
CHLORIDE SERPL-SCNC: 105 MMOL/L — SIGNIFICANT CHANGE UP (ref 96–108)
CO2 SERPL-SCNC: 19 MMOL/L — LOW (ref 22–31)
CREAT SERPL-MCNC: 1.09 MG/DL — SIGNIFICANT CHANGE UP (ref 0.5–1.3)
GLUCOSE SERPL-MCNC: 88 MG/DL — SIGNIFICANT CHANGE UP (ref 70–99)
HCT VFR BLD CALC: 23.4 % — LOW (ref 39–50)
HCT VFR BLD CALC: 25.1 % — LOW (ref 39–50)
HGB BLD-MCNC: 7.8 G/DL — LOW (ref 13–17)
HGB BLD-MCNC: 7.9 G/DL — LOW (ref 13–17)
INR BLD: 1.06 RATIO — SIGNIFICANT CHANGE UP (ref 0.88–1.16)
LDH SERPL L TO P-CCNC: 191 U/L — SIGNIFICANT CHANGE UP (ref 50–242)
MCHC RBC-ENTMCNC: 28.3 PG — SIGNIFICANT CHANGE UP (ref 27–34)
MCHC RBC-ENTMCNC: 29.9 PG — SIGNIFICANT CHANGE UP (ref 27–34)
MCHC RBC-ENTMCNC: 31.4 GM/DL — LOW (ref 32–36)
MCHC RBC-ENTMCNC: 33.3 GM/DL — SIGNIFICANT CHANGE UP (ref 32–36)
MCV RBC AUTO: 89.9 FL — SIGNIFICANT CHANGE UP (ref 80–100)
MCV RBC AUTO: 89.9 FL — SIGNIFICANT CHANGE UP (ref 80–100)
PLATELET # BLD AUTO: 226 K/UL — SIGNIFICANT CHANGE UP (ref 150–400)
PLATELET # BLD AUTO: 235 K/UL — SIGNIFICANT CHANGE UP (ref 150–400)
POTASSIUM SERPL-MCNC: 4 MMOL/L — SIGNIFICANT CHANGE UP (ref 3.5–5.3)
POTASSIUM SERPL-SCNC: 4 MMOL/L — SIGNIFICANT CHANGE UP (ref 3.5–5.3)
PROT SERPL-MCNC: 6.6 G/DL — SIGNIFICANT CHANGE UP (ref 6–8.3)
PROTHROM AB SERPL-ACNC: 11.5 SEC — SIGNIFICANT CHANGE UP (ref 9.8–12.7)
RBC # BLD: 2.6 M/UL — LOW (ref 4.2–5.8)
RBC # BLD: 2.8 M/UL — LOW (ref 4.2–5.8)
RBC # FLD: 18.8 % — HIGH (ref 10.3–14.5)
RBC # FLD: 19.1 % — HIGH (ref 10.3–14.5)
SODIUM SERPL-SCNC: 137 MMOL/L — SIGNIFICANT CHANGE UP (ref 135–145)
WBC # BLD: 7.6 K/UL — SIGNIFICANT CHANGE UP (ref 3.8–10.5)
WBC # BLD: 8.1 K/UL — SIGNIFICANT CHANGE UP (ref 3.8–10.5)
WBC # FLD AUTO: 7.6 K/UL — SIGNIFICANT CHANGE UP (ref 3.8–10.5)
WBC # FLD AUTO: 8.1 K/UL — SIGNIFICANT CHANGE UP (ref 3.8–10.5)

## 2017-10-31 PROCEDURE — 99223 1ST HOSP IP/OBS HIGH 75: CPT

## 2017-10-31 PROCEDURE — 99222 1ST HOSP IP/OBS MODERATE 55: CPT | Mod: GC

## 2017-10-31 PROCEDURE — 90832 PSYTX W PT 30 MINUTES: CPT

## 2017-10-31 PROCEDURE — 99232 SBSQ HOSP IP/OBS MODERATE 35: CPT

## 2017-10-31 RX ORDER — FOLIC ACID 0.8 MG
1 TABLET ORAL DAILY
Qty: 0 | Refills: 0 | Status: DISCONTINUED | OUTPATIENT
Start: 2017-10-31 | End: 2017-11-03

## 2017-10-31 RX ORDER — QUETIAPINE FUMARATE 200 MG/1
50 TABLET, FILM COATED ORAL DAILY
Qty: 0 | Refills: 0 | Status: DISCONTINUED | OUTPATIENT
Start: 2017-10-31 | End: 2017-11-03

## 2017-10-31 RX ORDER — HYDRALAZINE HCL 50 MG
25 TABLET ORAL EVERY 8 HOURS
Qty: 0 | Refills: 0 | Status: DISCONTINUED | OUTPATIENT
Start: 2017-10-31 | End: 2017-11-03

## 2017-10-31 RX ORDER — LIDOCAINE 4 G/100G
1 CREAM TOPICAL DAILY
Qty: 0 | Refills: 0 | Status: DISCONTINUED | OUTPATIENT
Start: 2017-10-31 | End: 2017-10-31

## 2017-10-31 RX ORDER — FERROUS SULFATE 325(65) MG
325 TABLET ORAL DAILY
Qty: 0 | Refills: 0 | Status: DISCONTINUED | OUTPATIENT
Start: 2017-10-31 | End: 2017-11-03

## 2017-10-31 RX ORDER — SPIRONOLACTONE 25 MG/1
25 TABLET, FILM COATED ORAL DAILY
Qty: 0 | Refills: 0 | Status: DISCONTINUED | OUTPATIENT
Start: 2017-10-31 | End: 2017-11-03

## 2017-10-31 RX ORDER — PANTOPRAZOLE SODIUM 20 MG/1
40 TABLET, DELAYED RELEASE ORAL
Qty: 0 | Refills: 0 | Status: DISCONTINUED | OUTPATIENT
Start: 2017-10-31 | End: 2017-10-31

## 2017-10-31 RX ORDER — CARVEDILOL PHOSPHATE 80 MG/1
12.5 CAPSULE, EXTENDED RELEASE ORAL EVERY 12 HOURS
Qty: 0 | Refills: 0 | Status: DISCONTINUED | OUTPATIENT
Start: 2017-10-31 | End: 2017-11-03

## 2017-10-31 RX ORDER — ASCORBIC ACID 60 MG
500 TABLET,CHEWABLE ORAL DAILY
Qty: 0 | Refills: 0 | Status: DISCONTINUED | OUTPATIENT
Start: 2017-10-31 | End: 2017-11-03

## 2017-10-31 RX ORDER — HYDROCORTISONE 1 %
1 OINTMENT (GRAM) TOPICAL
Qty: 0 | Refills: 0 | Status: DISCONTINUED | OUTPATIENT
Start: 2017-10-31 | End: 2017-10-31

## 2017-10-31 RX ORDER — ACETAMINOPHEN 500 MG
650 TABLET ORAL EVERY 6 HOURS
Qty: 0 | Refills: 0 | Status: DISCONTINUED | OUTPATIENT
Start: 2017-10-31 | End: 2017-11-03

## 2017-10-31 RX ORDER — FUROSEMIDE 40 MG
20 TABLET ORAL EVERY OTHER DAY
Qty: 0 | Refills: 0 | Status: DISCONTINUED | OUTPATIENT
Start: 2017-10-31 | End: 2017-11-03

## 2017-10-31 RX ORDER — PANTOPRAZOLE SODIUM 20 MG/1
40 TABLET, DELAYED RELEASE ORAL EVERY 12 HOURS
Qty: 0 | Refills: 0 | Status: DISCONTINUED | OUTPATIENT
Start: 2017-10-31 | End: 2017-11-03

## 2017-10-31 RX ORDER — AMIODARONE HYDROCHLORIDE 400 MG/1
200 TABLET ORAL DAILY
Qty: 0 | Refills: 0 | Status: DISCONTINUED | OUTPATIENT
Start: 2017-10-31 | End: 2017-11-03

## 2017-10-31 RX ORDER — COLCHICINE 0.6 MG
0.6 TABLET ORAL
Qty: 0 | Refills: 0 | Status: DISCONTINUED | OUTPATIENT
Start: 2017-10-31 | End: 2017-11-03

## 2017-10-31 RX ORDER — MEXILETINE HYDROCHLORIDE 150 MG/1
150 CAPSULE ORAL EVERY 8 HOURS
Qty: 0 | Refills: 0 | Status: DISCONTINUED | OUTPATIENT
Start: 2017-10-31 | End: 2017-11-03

## 2017-10-31 RX ADMIN — Medication 0.6 MILLIGRAM(S): at 06:31

## 2017-10-31 RX ADMIN — Medication 25 MILLIGRAM(S): at 13:27

## 2017-10-31 RX ADMIN — Medication 325 MILLIGRAM(S): at 13:28

## 2017-10-31 RX ADMIN — Medication 25 MILLIGRAM(S): at 22:51

## 2017-10-31 RX ADMIN — Medication 0.6 MILLIGRAM(S): at 17:05

## 2017-10-31 RX ADMIN — PANTOPRAZOLE SODIUM 40 MILLIGRAM(S): 20 TABLET, DELAYED RELEASE ORAL at 17:05

## 2017-10-31 RX ADMIN — AMIODARONE HYDROCHLORIDE 200 MILLIGRAM(S): 400 TABLET ORAL at 06:32

## 2017-10-31 RX ADMIN — MEXILETINE HYDROCHLORIDE 150 MILLIGRAM(S): 150 CAPSULE ORAL at 06:31

## 2017-10-31 RX ADMIN — QUETIAPINE FUMARATE 50 MILLIGRAM(S): 200 TABLET, FILM COATED ORAL at 13:27

## 2017-10-31 RX ADMIN — CARVEDILOL PHOSPHATE 12.5 MILLIGRAM(S): 80 CAPSULE, EXTENDED RELEASE ORAL at 06:32

## 2017-10-31 RX ADMIN — Medication 500 MILLIGRAM(S): at 13:27

## 2017-10-31 RX ADMIN — Medication 1 MILLIGRAM(S): at 13:26

## 2017-10-31 RX ADMIN — MEXILETINE HYDROCHLORIDE 150 MILLIGRAM(S): 150 CAPSULE ORAL at 22:51

## 2017-10-31 RX ADMIN — Medication 25 MILLIGRAM(S): at 06:32

## 2017-10-31 RX ADMIN — SPIRONOLACTONE 25 MILLIGRAM(S): 25 TABLET, FILM COATED ORAL at 06:31

## 2017-10-31 RX ADMIN — MEXILETINE HYDROCHLORIDE 150 MILLIGRAM(S): 150 CAPSULE ORAL at 13:27

## 2017-10-31 RX ADMIN — Medication 20 MILLIGRAM(S): at 13:27

## 2017-10-31 RX ADMIN — PANTOPRAZOLE SODIUM 40 MILLIGRAM(S): 20 TABLET, DELAYED RELEASE ORAL at 06:32

## 2017-10-31 RX ADMIN — CARVEDILOL PHOSPHATE 12.5 MILLIGRAM(S): 80 CAPSULE, EXTENDED RELEASE ORAL at 17:05

## 2017-10-31 NOTE — PROGRESS NOTE ADULT - PROBLEM SELECTOR PLAN 1
C/W Protonix 40mg IV Q12h, clear liquid diet.   NPO p MN. Possible endoscopy in AM with GI.  Monitor H/H. Daily CBC  C/w iron, folate, vitamin C.

## 2017-10-31 NOTE — DIETITIAN INITIAL EVALUATION ADULT. - NS FNS REASON FOR WEIGHT CHANG
Per previous RD note (10/18/2017), patient weighed 147 pounds. Patient suspects he has gained weight since last admission. Noted current weight 10/31 of 149.6 pounds suggestive of 2.6 pound weight gain in ~2 weeks. Patient attributes weight gain to increased PO intake, appetite.

## 2017-10-31 NOTE — DIETITIAN INITIAL EVALUATION ADULT. - ADHERENCE
Patient with PMH of chronic systolic heart failure S/P LVAD placement 6/12/2017. Patient states he watches his intake of sodium and that his aide does not cook with salt but may use other seasonings. Patient states he has become accustomed to "blander" foods. Patient with fair-good teach back on dietary recommendations regarding heart failure including need to restrict sodium in diet and weigh himself daily. Reviewed diet education with patient. Patient previously taking coumadin but, as per heart failure team, anticoagulant currently on hold due to multiple GI bleeds./good

## 2017-10-31 NOTE — PROGRESS NOTE ADULT - SUBJECTIVE AND OBJECTIVE BOX
Patient seen and evaluated @   Chief Complaint:     HPI:  67M PMH Chronic Systolic Heart Failure (ACC/AHA Stage D) due to NICMP s/p LVAD 17, GIB s/p Cautery (2017), known AV Malformations, now Chronic Anemia due to numerous GIBs (off AC), A-Fib, VT s/p AICD who presented to the ED after he has felt weak, dyspneic and dizzy x 3 days and discovered he had dark tarry stools 1 day ago. He called the LVAD team who recommended that he come to [facility] ED for evaluation of anemia and potential transfusion. Denies fever, chills, n/v/d, chest pain. (31 Oct 2017 01:41)    PMH:   GIB (gastrointestinal bleeding)  H/O prior ablation treatment  Ventricular fibrillation  PAF (paroxysmal atrial fibrillation)  Non-Ischemic Cardiomyopathy  SVT (Supraventricular Tachycardia)  HTN  CHF (Congestive Heart Failure)    PSH:   LVAD (left ventricular assist device) present  Status post left hip replacement  Hypertension  Hypertension  History of Prior Ablation Treatment  AICD (Automatic Cardioverter/Defibrillator) Present    Medications:   acetaminophen   Tablet 650 milliGRAM(s) Oral every 6 hours PRN  amiodarone    Tablet 200 milliGRAM(s) Oral daily  ascorbic acid 500 milliGRAM(s) Oral daily  carvedilol 12.5 milliGRAM(s) Oral every 12 hours  colchicine 0.6 milliGRAM(s) Oral two times a day  ferrous    sulfate 325 milliGRAM(s) Oral daily  folic acid 1 milliGRAM(s) Oral daily  furosemide    Tablet 20 milliGRAM(s) Oral every other day  hydrALAZINE 25 milliGRAM(s) Oral every 8 hours  mexiletine 150 milliGRAM(s) Oral every 8 hours  pantoprazole  Injectable 40 milliGRAM(s) IV Push every 12 hours  QUEtiapine 50 milliGRAM(s) Oral daily  spironolactone 25 milliGRAM(s) Oral daily    Allergies:  No Known Allergies    FAMILY HISTORY:  No pertinent family history in first degree relatives    LVAD Interrogation: no alarms  Pump Flow: 6-6.5  Pump Speed: 9200  Pulse Index: 5.9-6.4  Pump Power: 6- 6.2  VAD Events: none  Driveline evaluation:  C/D/I  Programming Changes: [X ] No changes made [ ] Changes made:      Height (cm): 172.72 (10-30 @ 23:55)  Weight (kg): 69.1 (10-30 @ 23:55)  BMI (kg/m2): 23.2 (10-30 @ 23:55)  BSA (m2): 1.82 (10-30 @ 23:55)        Review of Systems:  Constitutional: [ ] Fever [ ] Chills [ ] Fatigue [ ] Weight change   HEENT: [ ] Blurred vision [ ] Eye Pain [ ] Headache [ ] Runny nose [ ] Sore Throat   Respiratory: [ ] Cough [ ] Wheezing [ ] Shortness of breath  Cardiovascular: [ ] Chest Pain [ ] Palpitations [ ] LOBATO [ ] PND [ ] Orthopnea  Gastrointestinal: [ ] Abdominal Pain [ ] Diarrhea [ ] Constipation [ ] Hemorrhoids [ ] Nausea [ ] Vomiting  Genitourinary: [ ] Nocturia [ ] Dysuria [ ] Incontinence  Extremities: [ ] Swelling [ ] Joint Pain  Neurologic: [ ] Focal deficit [ ] Paresthesias [ ] Syncope  Lymphatic: [ ] Swelling [ ] Lymphadenopathy   Skin: [ ] Rash [ ] Ecchymoses [ ] Wounds [ ] Lesions  Psychiatry: [ ] Depression [ ] Suicidal/Homicidal Ideation [ ] Anxiety [ ] Sleep Disturbances  [X ] 10 point review of systems is otherwise negative except as mentioned above            [ ]Unable to obtain    Physical Exam:  T(C): 36.4 (10-31-17 @ 17:03), Max: 36.7 (10-31-17 @ 02:25)  HR: 80 (10-31-17 @ 17:03) (78 - 103)  MAP: 80-85  RR: 18 (10-31-17 @ 17:03) (15 - 18)  SpO2: 100% (10-31-17 @ 17:03) (95% - 100%)  Wt(kg): 67.9  I&O's Detail    30 Oct 2017 07:  -  31 Oct 2017 07:00  --------------------------------------------------------  IN:    Packed Red Blood Cells: 600 mL  Total IN: 600 mL    OUT:    Voided: 200 mL  Total OUT: 200 mL    Total NET: 400 mL      31 Oct 2017 07:01  -  31 Oct 2017 17:18  --------------------------------------------------------  IN:    Oral Fluid: 480 mL  Total IN: 480 mL    OUT:  Total OUT: 0 mL    Total NET: 480 mL          Daily Weight in k.8 (31 Oct 2017 14:30), Weight in k.9 (31 Oct 2017 08:39)    Appearance: [X ] Normal [ ] NAD  Eyes: X[ ] PERRL [ ] EOMI  HENT: X[ ] Normal oral [place] [ ]NC/AT  Cardiovascular: [ ] S1 [ ] S2 [ ] RRR [ ] No m/r/g [ ]No edema [ ] JVP, +hum of VAD  Respiratory: [X ] Clear to auscultation bilaterally  Gastrointestinal: [X ] Soft [X ] Non-tender [X ] Non-distended [X ] BS+  Musculoskeletal: [X ] No clubbing [ ] No joint deformity   Neurologic: [X ] Non-focal  Lymphatic: [X ] No lymphadenopathy  Psychiatry: [X ] AAOx3 [ ] Mood & affect appropriate  Skin: [X ] No rashes [ ] No ecchymoses [ ] No cyanosis      Labs:                        7.9    7.6   )-----------( 235      ( 31 Oct 2017 14:17 )             25.1     10-31    137  |  105  |  20  ----------------------------<  88  4.0   |  19<L>  |  1.09    Ca    8.9      31 Oct 2017 04:55    TPro  6.6  /  Alb  3.4  /  TBili  0.9  /  DBili  x   /  AST  43<H>  /  ALT  46<H>  /  AlkPhos  90  10-31    PT/INR - ( 31 Oct 2017 14:17 )   PT: 11.5 sec;   INR: 1.06 ratio         PTT - ( 31 Oct 2017 14:17 )  PTT:27.7 sec    LDH: 191

## 2017-10-31 NOTE — PROGRESS NOTE ADULT - PROBLEM SELECTOR PLAN 1
Protonix 40mg IV Q12h, clear liquid diet. Call GI in AM for possible repeat endoscopy.  Monitor H/H. Repeat CBC 5am.  C/w iron, folate, vitamin C.  Monitor for symptoms of anemia such as weakness, pallor. C/W Protonix 40mg IV Q12h, clear liquid diet.   NPO p MN. Possible endoscopy in AM with GI.  Monitor H/H. Daily CBC  C/w iron, folate, vitamin C.

## 2017-10-31 NOTE — DIETITIAN INITIAL EVALUATION ADULT. - ENERGY NEEDS
Ht 68 inches Wt 149.6 pounds BMI 22.7 Kg/m^2   pounds +/- 10%; 97% IBW  Edema: none noted Skin: no pressure ulcers   Other pertinent information: 66 yo male with PMH of chronic systolic heart failure S/P LVAD (6/12/2017), paroxysmal A fib, GI bleed S/P cautery (7/25/2017), AV malformation, chronic anemia secondary to multiple GIBs, non-ischemic cardiomyopathy, HTN, VT S/P AICD, osteoarthritis of right knee admitted following weakness, dyspnea, dizziness x3 days and dark tarry stools x1. Transfused 2 units PRBC and admitted for further management of care

## 2017-10-31 NOTE — DIETITIAN INITIAL EVALUATION ADULT. - NS AS NUTRI INTERV MEDICAL AND FOOD SUPPLEMENTS
Recommend Ensure Clear 3x daily while on clear liquid diet. If diet is able to be advanced, recommend Ensure Enlive 3x daily.

## 2017-10-31 NOTE — CONSULT NOTE ADULT - ATTENDING COMMENTS
PRBC transfusion.  Hold all AC.  GI f/u.  Will d/w [facility] transplant program.
As above.  Video capsule endoscopy 11/1/17 to look for source of anemia in upper GI tract and small bowel.  NPO after MN.

## 2017-10-31 NOTE — PROGRESS NOTE ADULT - PROBLEM SELECTOR PLAN 2
C/w hydralazine, amiodarone, lasix, aldactone, mexilitine, coreg. C/w hydralazine 25 q8, amiodarone 200 daily, lasix 20 qod, aldactone 25 daily, mexilitine 150 q8, coreg 12.5 bid.  Check LDH daily.  Ambulate 4x daily as tolerated.

## 2017-10-31 NOTE — PROGRESS NOTE ADULT - ASSESSMENT
68 y/o man with h/o Chronic Systolic Heart Failure (ACC/AHA Stage D) due to NICMP s/p LVAD 6/12/17, GIB s/p Cautery (7/25/2017), known AV Malformations, now Chronic Anemia due to numerous GIBs (off AC), A-Fib, VT s/p AICD who presented to the ED with melena, found to have an H/H of 6/19.6. Transfused 2U PRBC with much symptomatic improvement. 66 y/o man with h/o Chronic Systolic Heart Failure (ACC/AHA Stage D) due to NICMP s/p LVAD 6/12/17, GIB s/p Cautery (7/25/2017), known AV Malformations, now Chronic Anemia due to numerous GIBs (off AC), A-Fib, VT s/p AICD who presented to the ED with melena, found to have an H/H of 6/19.6. Transfused 2U PRBC with much symptomatic improvement.  10/31 Repeat H/H 7.9/25.1, GI following.

## 2017-10-31 NOTE — DIETITIAN INITIAL EVALUATION ADULT. - NS AS NUTRI INTERV ED CONTENT
Educated patient on dietary recommendations regarding Heart Failure. Reviewed food sources high in sodium, alternatives for high sodium foods, and encouraged patient to use spices/herbs in place of salt. Encouraged patient to monitor weight daily for fluid retention and monitor fluid intake daily. Encouraged patient to contact MD if he notices weight gain of 2-3lbs. in one day, 5lbs. in one week, or weight trending upwards for several days in a row. Provided written materials on Nutrition Therapy for Heart Failure from [employer]

## 2017-10-31 NOTE — H&P ADULT - ASSESSMENT
68 y/o man with h/o Chronic Systolic Heart Failure (ACC/AHA Stage D) due to NICMP s/p LVAD 6/12/17, GIB s/p Cautery (7/25/2017), known AV Malformations, now Chronic Anemia due to numerous GIBs (off AC), A-Fib, VT s/p AICD who presented to the ED with melena, found to have an H/H of 6/19.6. Transfused 2U PRBC with much symptomatic improvement.

## 2017-10-31 NOTE — DIETITIAN INITIAL EVALUATION ADULT. - PERTINENT LABORATORY DATA
(10/31) CO2 19 low, AST 43 high, ALT 46 high, Hgb 7.8 low, Hct 23.4 low, Na 137, K 4.0, Cl 105, BUN 20, Cr 1.09, BG 88, Ca 8.9, Protein total 6.6, Albumin 3.4, Bilirubin 0.9, Alk Phos 90, Lactate Dehydrogenase 191

## 2017-10-31 NOTE — H&P ADULT - NSHPPHYSICALEXAM_GEN_ALL_CORE
General: NAD, lying supine in bed  Neuro: A&Ox3, no focal deficits  Card: -S1, S2. + LVAD sounds.  Pulm: CTA b/l  Abd: soft, nontender, + BS, + BM last yesterday. Driveline site c/d/i.  Ext: No LE edema, no calf tenderness.

## 2017-10-31 NOTE — PROGRESS NOTE ADULT - PROBLEM SELECTOR PLAN 2
C/w hydralazine 25 q8, amiodarone 200 daily, lasix 20 qod, aldactone 25 daily, mexilitine 150 q8, coreg 12.5 bid.  Check LDH daily.  Ambulate 4x daily as tolerated.

## 2017-10-31 NOTE — PROGRESS NOTE ADULT - ASSESSMENT
Appears to be coping well with rehospitalization and current GI issues.  Feels comfortable with staff and treatment team.  Attributes his ability to cope so well with his [employer] pastora and strong support system (family, friends, staff).    Appetite fair.  Sleeping well.  Has strong support from brother and close friend and has been more willing to ask others for help and support.  Mood euthymic.        DX:  Systolic heart failure; r/o Adjustment disorder       Recommendations:   Behavioral Cardiology will continue to follow

## 2017-10-31 NOTE — DIETITIAN INITIAL EVALUATION ADULT. - PHYSICAL APPEARANCE
Patient seen by this RD 10/18/2017. Patient still appears thin but face appears vazquez, more well nourished than last encounter. Patient consented to nutrition focused physical exam. Findings as follows: mild muscle wasting from temples/clavicles, no muscle wasting from shoulders/thigh/interosseous muscles noted, moderate muscle wasting from calf noted. Mild fat loss from ribs noted, no fat loss from orbitals or triceps noted

## 2017-10-31 NOTE — PROGRESS NOTE ADULT - SUBJECTIVE AND OBJECTIVE BOX
Behavioral Cardiology Progress Note     HPI:  Mr. Baez is a 67 year old man with chronic systolic heart failure, ACC/AHA stage D, due to nonischemic cardiomyopathy, s/p LVAD 6/12/17, GIB s/p cautery 7/25/17, known AV malformation, now  chronic anemia numerous GIBs (off AC), Afib, VT s/p AICD, who presented to ED with melena, found to have H/H 6/19.6.  Transfused 2U PRBC.    Behavioral Health Assessment:     Mood:  "I’m feeling better."            Current stressors:   Readmission to hospital   Adjustment to living with LVAD      Support system/family support: Good support from family and close friend      Coping strategies: Talking with family and staff, watching TV; thinking about his granddaughter     Understanding of medical illness and treatment plan:  Understands reasons for this admission and plan.  Good understanding of LVAD management; benefiting from ongoing education and review.      MSE: Pt seen resting in bed.  A&Ox3.  Well related with good eye contact.  Thought process goal directed.  No abnormal thought content; denies s/i.  Mood upbeat.  Affect full range.  Insight and judgment adequate.

## 2017-10-31 NOTE — CHART NOTE - NSCHARTNOTEFT_GEN_A_CORE
Upon Nutritional Assessment by the Registered Dietitian your patient was determined to meet criteria / has evidence of the following diagnosis/diagnoses:            [x]  Moderate Protein Calorie Malnutrition    Mild muscle wasting, Mild fat loss, Currently on clears/inadequate diet    Findings as based on:  [x] Comprehensive nutrition assessment   [x] Nutrition Focused Physical Exam  [x] Other: medical record      Nutrition Plan/Recommendations:      1) Recommend Ensure Clear 3x daily while on clear liquid diet.   2) As medically feasible, advance to low fiber, low sodium diet with Ensure Enlive 3x daily  3) Reviewed diet education for Heart Failure, provided handout  4) Encouraged continued good PO intake of meals      PROVIDER Section:     By signing this assessment you are acknowledging and agree with the diagnosis/diagnoses assigned by the Registered Dietitian    Comments:

## 2017-10-31 NOTE — CONSULT NOTE ADULT - ASSESSMENT
68 yo man with LVAD for niCM with mutliple prior episodes of GI bleeding which were believed to be due to angioectasia of the small bowel, presenting with melena and acute blood loss anemia. DDX: angioectasia > GAVE vs PUD vs Dieulafoy lesion.

## 2017-10-31 NOTE — DIETITIAN INITIAL EVALUATION ADULT. - ORAL INTAKE PTA
good/Patient reports good appetite & PO intake PTA. Patient has a home health aide from 10am-2pm 5 days a week who prepares his meals and is a good cook. Patient states he was feeling well and was eating at least 3 meals daily. Usual Diet Recall: Breakfast- cornflakes with milk, banana Lunch/Dinner- steak, fish, diego greens (not currently on coumadin), potatoes/rice

## 2017-10-31 NOTE — H&P ADULT - PROBLEM SELECTOR PLAN 1
Protonix 40mg IV Q12h, clear liquid diet. Call GI in AM for possible repeat endoscopy.  Monitor H/H. Repeat CBC 5am.  C/w iron, folate, vitamin C.  Monitor for symptoms of anemia such as weakness, pallor.

## 2017-10-31 NOTE — PROVIDER CONTACT NOTE (OTHER) - ASSESSMENT
pt walking to bathroom during episode, no c/o CP or SOB. c/o dizziness & weakness, VS stable, /61, 93% on RA, 103 HR, 18 RR.

## 2017-10-31 NOTE — H&P ADULT - HISTORY OF PRESENT ILLNESS
67M PMH Chronic Systolic Heart Failure (ACC/AHA Stage D) due to NICMP s/p LVAD 6/12/17, GIB s/p Cautery (7/25/2017), known AV Malformations, now Chronic Anemia due to numerous GIBs (off AC), A-Fib, VT s/p AICD who presented to the ED after he has felt weak, dyspneic and dizzy x 3 days and discovered he had dark tarry stools 1 day ago. He called the LVAD team who recommended that he come to [facility] ED for evaluation of anemia and potential transfusion. Denies fever, chills, n/v/d, chest pain.

## 2017-10-31 NOTE — CONSULT NOTE ADULT - SUBJECTIVE AND OBJECTIVE BOX
Chief Complaint:  Patient is a 67y old  Male who presents with a chief complaint of Melena x 1 day, h/o GIB (31 Oct 2017 01:41)      HPI: 66 yo man with niCM s/p LVAD and history of prior GI bleeds ascribed to small bowel angioectasias presenting with melena x1 week with progressive fatigue and weakness at home. He denies abd pain, nausea or vomiting. He has been taking iron pills so he was not sure if the dark color of his BMs was related to drug side effect. He denies any hematochezia or red coloring from his BMs.     In the ED his hgb was 6.0 and he was transfused 2 U pRBCs. His post-transfusion hgb is 7.9.    Allergies:  No Known Allergies      Home Medications:    Hospital Medications:  acetaminophen   Tablet 650 milliGRAM(s) Oral every 6 hours PRN  amiodarone    Tablet 200 milliGRAM(s) Oral daily  ascorbic acid 500 milliGRAM(s) Oral daily  carvedilol 12.5 milliGRAM(s) Oral every 12 hours  colchicine 0.6 milliGRAM(s) Oral two times a day  ferrous    sulfate 325 milliGRAM(s) Oral daily  folic acid 1 milliGRAM(s) Oral daily  furosemide    Tablet 20 milliGRAM(s) Oral every other day  hydrALAZINE 25 milliGRAM(s) Oral every 8 hours  mexiletine 150 milliGRAM(s) Oral every 8 hours  pantoprazole  Injectable 40 milliGRAM(s) IV Push every 12 hours  QUEtiapine 50 milliGRAM(s) Oral daily  spironolactone 25 milliGRAM(s) Oral daily      PMHX/PSHX:  GIB (gastrointestinal bleeding)  H/O prior ablation treatment  Ventricular fibrillation  PAF (paroxysmal atrial fibrillation)  Non-Ischemic Cardiomyopathy  SVT (Supraventricular Tachycardia)  HTN  CHF (Congestive Heart Failure)  LVAD (left ventricular assist device) present  Status post left hip replacement  Hypertension  Hypertension  History of Prior Ablation Treatment  AICD (Automatic Cardioverter/Defibrillator) Present      Family history:  No pertinent family history in first degree relatives      Social History:     ROS:     General:  No wt loss, fevers, chills, night sweats, fatigue   Eyes:  Good vision, no reported pain  ENT:  No sore throat, pain, runny nose  CV:  No chest pain, SOB, palpitations, orthopnea  Resp:  No cough, SOB, wheezing  GI:  See HPI  :  No pain, bleeding, incontinence, nocturia  Muscle:  No pain, weakness  Neuro:  No weakness, tingling, memory problems  Psych:  No fatigue, insomnia, mood problems, depression  Endocrine:  No cold/heat intolerance  Heme:  No petechiae, ecchymosis, easy bruising  Skin:  No rash, edema      PHYSICAL EXAM:     GENERAL: NAD  HEENT: sclera anicteric  CHEST: CTAB  HEART:  RRR, no MRG, no edema  ABDOMEN:  Soft, non-tender, non-distended, no masses, no hepatosplenomegaly, melenic stool  EXTREMITIES:  no cyanosis, or edema  SKIN:  No rash  NEURO:  Alert, orientedx3     Vital Signs:  Vital Signs Last 24 Hrs  T(C): 36.4 (31 Oct 2017 17:03), Max: 36.7 (31 Oct 2017 02:25)  T(F): 97.6 (31 Oct 2017 17:03), Max: 98 (31 Oct 2017 02:25)  HR: 80 (31 Oct 2017 17:03) (78 - 103)  BP: --  BP(mean): 80 (31 Oct 2017 17:03) (80 - 86)  RR: 18 (31 Oct 2017 17:03) (15 - 18)  SpO2: 100% (31 Oct 2017 17:03) (95% - 100%)  Daily Height in cm: 172.72 (30 Oct 2017 23:55)    Daily Weight in k.8 (31 Oct 2017 14:30)    LABS:                        7.9    7.6   )-----------( 235      ( 31 Oct 2017 14:17 )             25.1     10-31    137  |  105  |  20  ----------------------------<  88  4.0   |  19<L>  |  1.09    Ca    8.9      31 Oct 2017 04:55    TPro  6.6  /  Alb  3.4  /  TBili  0.9  /  DBili  x   /  AST  43<H>  /  ALT  46<H>  /  AlkPhos  90  10-31    LIVER FUNCTIONS - ( 31 Oct 2017 04:55 )  Alb: 3.4 g/dL / Pro: 6.6 g/dL / ALK PHOS: 90 U/L / ALT: 46 U/L RC / AST: 43 U/L / GGT: x           PT/INR - ( 31 Oct 2017 14:17 )   PT: 11.5 sec;   INR: 1.06 ratio         PTT - ( 31 Oct 2017 14:17 )  PTT:27.7 sec        Imaging:

## 2017-10-31 NOTE — PROGRESS NOTE ADULT - SUBJECTIVE AND OBJECTIVE BOX
Subjective: Pt states " " denies any CP, SOB, N/V and palpitations. No acute events overnight.     Telemetry:    Vital Signs Last 24 Hrs  T(C): 36.4 (10-31-17 @ 13:15), Max: 36.7 (10-31-17 @ 02:25)  T(F): 97.6 (10-31-17 @ 13:15), Max: 98 (10-31-17 @ 02:25)  HR: 85 (10-31-17 @ 13:15) (78 - 103)  BP: --  RR: 18 (10-31-17 @ 13:15) (15 - 18)  SpO2: 98% (10-31-17 @ 13:15) (95% - 100%)             10-30 @ 07:01  -  10-31 @ 07:00  --------------------------------------------------------  IN: 600 mL / OUT: 200 mL / NET: 400 mL    10-31 @ 07:01  -  10-31 @ 16:37  --------------------------------------------------------  IN: 480 mL / OUT: 0 mL / NET: 480 mL        Daily Height in cm: 172.72 (30 Oct 2017 23:55)    Daily Weight in k.8 (31 Oct 2017 14:30)    CAPILLARY BLOOD GLUCOSE              PHYSICAL EXAM    Neurology: A&Ox3, nonfocal, no gross deficits  CV : RRR+S1S2  Sternal Wound: MSI CDI , Stable  Lungs: Respirations non-labored, B/L BS  Abdomen: Soft, NT/ND, +BSx4Q, last BM   (-)N/V/D  : Voiding without difficulty  Extremities: B/L LE edema, negative calf tenderness       MEDICATIONS  acetaminophen   Tablet 650 milliGRAM(s) Oral every 6 hours PRN  amiodarone    Tablet 200 milliGRAM(s) Oral daily  ascorbic acid 500 milliGRAM(s) Oral daily  carvedilol 12.5 milliGRAM(s) Oral every 12 hours  colchicine 0.6 milliGRAM(s) Oral two times a day  ferrous    sulfate 325 milliGRAM(s) Oral daily  folic acid 1 milliGRAM(s) Oral daily  furosemide    Tablet 20 milliGRAM(s) Oral every other day  hydrALAZINE 25 milliGRAM(s) Oral every 8 hours  mexiletine 150 milliGRAM(s) Oral every 8 hours  pantoprazole  Injectable 40 milliGRAM(s) IV Push every 12 hours  QUEtiapine 50 milliGRAM(s) Oral daily  spironolactone 25 milliGRAM(s) Oral daily      Discussed with Cardiothoracic Team at AM rounds. Subjective: Pt states "I'm tired" denies any CP, SOB, N/V and palpitations. No acute events overnight.     Telemetry: SR 80 - 90   Vital Signs Last 24 Hrs  T(F): 97.6, Max: 98   HR: 85   RR: 18   SpO2: 98% RA        10-31 @ 07:01  -  10-31 @ 16:37  --------------------------------------------------------  IN: 480 mL / OUT: 0 mL / NET: 480 mL    Daily Weight in k.8 (31 Oct 2017 14:30)    LVAD Heart Mate II  RPMs: 9200  Power: 6  Flow: 6  PI: 6.5    PHYSICAL EXAM  Neurology: A&Ox3, nonfocal, no gross deficits  CV : +VAD hum  Lungs: Respirations non-labored, B/L BS CTA  Abdomen: Soft, NT/ND, +BSx4Q, last BM 10/30 (-)N/V/D                  Driveline exit site with DSD CDI  : Voiding without difficulty  Extremities: B/L LE no edema, negative calf tenderness      MEDICATIONS  acetaminophen   Tablet 650 milliGRAM(s) Oral every 6 hours PRN  amiodarone    Tablet 200 milliGRAM(s) Oral daily  ascorbic acid 500 milliGRAM(s) Oral daily  carvedilol 12.5 milliGRAM(s) Oral every 12 hours  colchicine 0.6 milliGRAM(s) Oral two times a day  ferrous    sulfate 325 milliGRAM(s) Oral daily  folic acid 1 milliGRAM(s) Oral daily  furosemide    Tablet 20 milliGRAM(s) Oral every other day  hydrALAZINE 25 milliGRAM(s) Oral every 8 hours  mexiletine 150 milliGRAM(s) Oral every 8 hours  pantoprazole  Injectable 40 milliGRAM(s) IV Push every 12 hours  QUEtiapine 50 milliGRAM(s) Oral daily  spironolactone 25 milliGRAM(s) Oral daily      Discussed with Cardiothoracic Team at AM rounds.

## 2017-10-31 NOTE — PROGRESS NOTE ADULT - ASSESSMENT
68 y/o man with h/o Chronic Systolic Heart Failure (ACC/AHA Stage D) due to NICMP s/p LVAD 6/12/17, GIB s/p Cautery (7/25/2017), known AV Malformations, now Chronic Anemia due to numerous GIBs (off AC), A-Fib, VT s/p AICD who presented to the ED with melena, found to have an H/H of 6/19.6. Transfused 2U PRBC with much symptomatic improvement.  10/31 Repeat H/H 7.9/25.1, GI following.

## 2017-10-31 NOTE — H&P ADULT - PSH
AICD (Automatic Cardioverter/Defibrillator) Present  [employer] with 1 [employer] lead4/1/09- explanted and replaced with Panelflytronic 2 leads on 9/2/09  History of Prior Ablation Treatment  for afib  LVAD (left ventricular assist device) present    Status post left hip replacement

## 2017-10-31 NOTE — DIETITIAN INITIAL EVALUATION ADULT. - OTHER INFO
Patient seen for LVAD initial nutritional assessment. Reports good appetite on clear liquid diet. Seen with 75% PO intake of clear liquid tray. Patient states he takes vitamin C, ferrous sulfate, folic acid daily PTA. Reports NKFA. Denies chewing/swallowing difficulty. Denies nausea/emesis. Last BM x2 10/31, loose.

## 2017-10-31 NOTE — DIETITIAN INITIAL EVALUATION ADULT. - NUTRITION INTERVENTION
Collaboration and Referral of Nutrition Care/Nutrition Education/Medical Food Supplements/Meals and Snack

## 2017-10-31 NOTE — CONSULT NOTE ADULT - PROBLEM SELECTOR RECOMMENDATION 9
- serial CBC, transfuse to hgb goal > 8  - NPO after midnight for EGD/eneteroscopy  - can continue emperic PPI - serial CBC, transfuse to hgb goal > 8  - regular diet today  - NPO after midnight for capsule endoscopy tomorrow  - can continue emperic PPI

## 2017-11-01 LAB
ALBUMIN SERPL ELPH-MCNC: 3.5 G/DL — SIGNIFICANT CHANGE UP (ref 3.3–5)
ALP SERPL-CCNC: 88 U/L — SIGNIFICANT CHANGE UP (ref 40–120)
ALT FLD-CCNC: 57 U/L RC — HIGH (ref 10–45)
ANION GAP SERPL CALC-SCNC: 11 MMOL/L — SIGNIFICANT CHANGE UP (ref 5–17)
AST SERPL-CCNC: 48 U/L — HIGH (ref 10–40)
BILIRUB SERPL-MCNC: 0.5 MG/DL — SIGNIFICANT CHANGE UP (ref 0.2–1.2)
BUN SERPL-MCNC: 15 MG/DL — SIGNIFICANT CHANGE UP (ref 7–23)
CALCIUM SERPL-MCNC: 9.1 MG/DL — SIGNIFICANT CHANGE UP (ref 8.4–10.5)
CHLORIDE SERPL-SCNC: 107 MMOL/L — SIGNIFICANT CHANGE UP (ref 96–108)
CO2 SERPL-SCNC: 21 MMOL/L — LOW (ref 22–31)
CREAT SERPL-MCNC: 1.08 MG/DL — SIGNIFICANT CHANGE UP (ref 0.5–1.3)
GLUCOSE SERPL-MCNC: 86 MG/DL — SIGNIFICANT CHANGE UP (ref 70–99)
HCT VFR BLD CALC: 24.1 % — LOW (ref 39–50)
HGB BLD-MCNC: 8.1 G/DL — LOW (ref 13–17)
LDH SERPL L TO P-CCNC: 211 U/L — SIGNIFICANT CHANGE UP (ref 50–242)
MCHC RBC-ENTMCNC: 30.4 PG — SIGNIFICANT CHANGE UP (ref 27–34)
MCHC RBC-ENTMCNC: 33.7 GM/DL — SIGNIFICANT CHANGE UP (ref 32–36)
MCV RBC AUTO: 90.2 FL — SIGNIFICANT CHANGE UP (ref 80–100)
PLATELET # BLD AUTO: 214 K/UL — SIGNIFICANT CHANGE UP (ref 150–400)
POTASSIUM SERPL-MCNC: 4 MMOL/L — SIGNIFICANT CHANGE UP (ref 3.5–5.3)
POTASSIUM SERPL-SCNC: 4 MMOL/L — SIGNIFICANT CHANGE UP (ref 3.5–5.3)
PROT SERPL-MCNC: 6.6 G/DL — SIGNIFICANT CHANGE UP (ref 6–8.3)
RBC # BLD: 2.67 M/UL — LOW (ref 4.2–5.8)
RBC # FLD: 19.2 % — HIGH (ref 10.3–14.5)
SODIUM SERPL-SCNC: 139 MMOL/L — SIGNIFICANT CHANGE UP (ref 135–145)
WBC # BLD: 7.6 K/UL — SIGNIFICANT CHANGE UP (ref 3.8–10.5)
WBC # FLD AUTO: 7.6 K/UL — SIGNIFICANT CHANGE UP (ref 3.8–10.5)

## 2017-11-01 PROCEDURE — 91110 GI TRC IMG INTRAL ESOPH-ILE: CPT | Mod: 26,GC

## 2017-11-01 RX ADMIN — Medication 500 MILLIGRAM(S): at 12:28

## 2017-11-01 RX ADMIN — QUETIAPINE FUMARATE 50 MILLIGRAM(S): 200 TABLET, FILM COATED ORAL at 12:28

## 2017-11-01 RX ADMIN — MEXILETINE HYDROCHLORIDE 150 MILLIGRAM(S): 150 CAPSULE ORAL at 22:09

## 2017-11-01 RX ADMIN — Medication 0.6 MILLIGRAM(S): at 18:04

## 2017-11-01 RX ADMIN — Medication 1 MILLIGRAM(S): at 12:27

## 2017-11-01 RX ADMIN — Medication 25 MILLIGRAM(S): at 05:53

## 2017-11-01 RX ADMIN — AMIODARONE HYDROCHLORIDE 200 MILLIGRAM(S): 400 TABLET ORAL at 05:53

## 2017-11-01 RX ADMIN — MEXILETINE HYDROCHLORIDE 150 MILLIGRAM(S): 150 CAPSULE ORAL at 05:53

## 2017-11-01 RX ADMIN — PANTOPRAZOLE SODIUM 40 MILLIGRAM(S): 20 TABLET, DELAYED RELEASE ORAL at 05:54

## 2017-11-01 RX ADMIN — Medication 25 MILLIGRAM(S): at 14:28

## 2017-11-01 RX ADMIN — Medication 25 MILLIGRAM(S): at 22:09

## 2017-11-01 RX ADMIN — MEXILETINE HYDROCHLORIDE 150 MILLIGRAM(S): 150 CAPSULE ORAL at 14:28

## 2017-11-01 RX ADMIN — CARVEDILOL PHOSPHATE 12.5 MILLIGRAM(S): 80 CAPSULE, EXTENDED RELEASE ORAL at 05:53

## 2017-11-01 RX ADMIN — CARVEDILOL PHOSPHATE 12.5 MILLIGRAM(S): 80 CAPSULE, EXTENDED RELEASE ORAL at 18:04

## 2017-11-01 RX ADMIN — Medication 325 MILLIGRAM(S): at 12:27

## 2017-11-01 RX ADMIN — PANTOPRAZOLE SODIUM 40 MILLIGRAM(S): 20 TABLET, DELAYED RELEASE ORAL at 18:04

## 2017-11-01 RX ADMIN — Medication 0.6 MILLIGRAM(S): at 05:53

## 2017-11-01 RX ADMIN — SPIRONOLACTONE 25 MILLIGRAM(S): 25 TABLET, FILM COATED ORAL at 05:53

## 2017-11-01 NOTE — PROGRESS NOTE ADULT - PROBLEM SELECTOR PLAN 1
C/W Protonix 40mg IV Q12h. reg diet after capsule study    NPO p MN pending capsule study   Monitor H/H. Daily CBC  C/w iron, folate, vitamin C.

## 2017-11-01 NOTE — PROGRESS NOTE ADULT - SUBJECTIVE AND OBJECTIVE BOX
Risks of video capsule endoscopy explained, including risk of retained capsule, potentially requiring surgery for removal.  Patient understands and agrees to risks.    Capsule ID: [id number]    Capsule swallowed at: 14:00    NPO now. Resume diet at: Clear liquids (no red liquids) @ 16:00 and previous diet @ 19:00    Equipment can be removed at: 11/2 @ 02:00    GI fellow will retrieve equipment in the morning.    If patient requires MRI for any reason please call GI fellow first and obtain abd x-ray to confirm passage of pill camera.

## 2017-11-01 NOTE — PROGRESS NOTE ADULT - SUBJECTIVE AND OBJECTIVE BOX
VITAL SIGNS    Telemetry:  SR 60-70  Vital Signs Last 24 Hrs  T(C): 36.4 (17 @ 08:51), Max: 36.5 (17 @ 04:27)  T(F): 97.6 (17 @ 08:51), Max: 97.7 (17 @ 04:27)  HR: 75 (17 @ 08:51) (66 - 85)  BP: 104/78 (17 @ 08:51) (90/70 - 104/78)  RR: 18 (17 @ 08:51) (18 - 18)  SpO2: 100% (17 @ 08:51) (98% - 100%)            10-31 @ 07:01  -   @ 07:00  --------------------------------------------------------  IN: 600 mL / OUT: 1850 mL / NET: -1250 mL     @ 07:01  -   @ 11:31  --------------------------------------------------------  IN: 0 mL / OUT: 0 mL / NET: 0 mL       Daily     Daily Weight in k.8 (31 Oct 2017 14:30)  Admit Wt: Drug Dosing Weight  Height (cm): 172.72 (30 Oct 2017 23:55)  Weight (kg): 69.1 (30 Oct 2017 23:55)  BMI (kg/m2): 23.2 (30 Oct 2017 23:55)  BSA (m2): 1.82 (30 Oct 2017 23:55)    Bilirubin Total, Serum: 0.5 mg/dL ( @ 05:13)        MEDICATIONS  acetaminophen   Tablet 650 milliGRAM(s) Oral every 6 hours PRN  amiodarone    Tablet 200 milliGRAM(s) Oral daily  ascorbic acid 500 milliGRAM(s) Oral daily  carvedilol 12.5 milliGRAM(s) Oral every 12 hours  colchicine 0.6 milliGRAM(s) Oral two times a day  ferrous    sulfate 325 milliGRAM(s) Oral daily  folic acid 1 milliGRAM(s) Oral daily  furosemide    Tablet 20 milliGRAM(s) Oral every other day  hydrALAZINE 25 milliGRAM(s) Oral every 8 hours  mexiletine 150 milliGRAM(s) Oral every 8 hours  pantoprazole  Injectable 40 milliGRAM(s) IV Push every 12 hours  QUEtiapine 50 milliGRAM(s) Oral daily  spironolactone 25 milliGRAM(s) Oral daily      PHYSICAL EXAM    Subjective: no complaints   Neurology: alert and oriented x 3, nonfocal, no gross deficits  CV : s1s1 reg no MRGS no jvd, + lvad humm   Sternal Wound :  CDI , Stable healed, drive line CDI   Lungs: CTA, equal chest expansion  Abdomen: soft, nontender, nondistended, positive bowel sounds, last bowel movement   :    voids  Extremities:  no  edema,  +dp b/l , no calt tenderness b/l , warm and perfused b/l    LABS      139  |  107  |  15  ----------------------------<  86  4.0   |  21<L>  |  1.08    Ca    9.1      2017 05:13    TPro  6.6  /  Alb  3.5  /  TBili  0.5  /  DBili  x   /  AST  48<H>  /  ALT  57<H>  /  AlkPhos  88                                   8.1    7.6   )-----------( 214      ( 2017 05:13 )             24.1          PT/INR - ( 31 Oct 2017 14:17 )   PT: 11.5 sec;   INR: 1.06 ratio         PTT - ( 31 Oct 2017 14:17 )  PTT:27.7 sec         PAST MEDICAL & SURGICAL HISTORY:  GIB (gastrointestinal bleeding)  Ventricular fibrillation: s/p AICD  PAF (paroxysmal atrial fibrillation): on xarelto  Non-Ischemic Cardiomyopathy  SVT (Supraventricular Tachycardia)  HTN  CHF (Congestive Heart Failure)  LVAD (left ventricular assist device) present  Status post left hip replacement  History of Prior Ablation Treatment: for afib  AICD (Automatic Cardioverter/Defibrillator) Present: [employer] with 1 [employer] lead09- explanted and replaced with Medtronic 2 leads on 09       Physical Therapy Rec:   Home  [  ]   Home w/ PT  [ x ]  Rehab  [  ]

## 2017-11-01 NOTE — PROGRESS NOTE ADULT - ASSESSMENT
68 y/o man with h/o Chronic Systolic Heart Failure (ACC/AHA Stage D) due to NICMP s/p LVAD 6/12/17, GIB s/p Cautery (7/25/2017), known AV Malformations, now Chronic Anemia due to numerous GIBs (off AC), A-Fib, VT s/p AICD who presented to the ED with melena, found to have an H/H of 6/19.6. Transfused 2U PRBC with much symptomatic improvement.  10/31 Repeat H/H 7.9/25.1, GI following.  11/1 capsule study  pending

## 2017-11-02 ENCOUNTER — TRANSCRIPTION ENCOUNTER (OUTPATIENT)
Age: 67
End: 2017-11-02

## 2017-11-02 LAB
ANION GAP SERPL CALC-SCNC: 13 MMOL/L — SIGNIFICANT CHANGE UP (ref 5–17)
BUN SERPL-MCNC: 15 MG/DL — SIGNIFICANT CHANGE UP (ref 7–23)
CALCIUM SERPL-MCNC: 8.4 MG/DL — SIGNIFICANT CHANGE UP (ref 8.4–10.5)
CHLORIDE SERPL-SCNC: 105 MMOL/L — SIGNIFICANT CHANGE UP (ref 96–108)
CO2 SERPL-SCNC: 17 MMOL/L — LOW (ref 22–31)
CREAT SERPL-MCNC: 1.23 MG/DL — SIGNIFICANT CHANGE UP (ref 0.5–1.3)
GLUCOSE SERPL-MCNC: 95 MG/DL — SIGNIFICANT CHANGE UP (ref 70–99)
HCT VFR BLD CALC: 26.5 % — LOW (ref 39–50)
HGB BLD-MCNC: 8.5 G/DL — LOW (ref 13–17)
INR BLD: 1.13 RATIO — SIGNIFICANT CHANGE UP (ref 0.88–1.16)
LDH SERPL L TO P-CCNC: 218 U/L — SIGNIFICANT CHANGE UP (ref 50–242)
MCHC RBC-ENTMCNC: 29 PG — SIGNIFICANT CHANGE UP (ref 27–34)
MCHC RBC-ENTMCNC: 32 GM/DL — SIGNIFICANT CHANGE UP (ref 32–36)
MCV RBC AUTO: 90.8 FL — SIGNIFICANT CHANGE UP (ref 80–100)
PLATELET # BLD AUTO: 239 K/UL — SIGNIFICANT CHANGE UP (ref 150–400)
POTASSIUM SERPL-MCNC: 4 MMOL/L — SIGNIFICANT CHANGE UP (ref 3.5–5.3)
POTASSIUM SERPL-SCNC: 4 MMOL/L — SIGNIFICANT CHANGE UP (ref 3.5–5.3)
PROTHROM AB SERPL-ACNC: 12.3 SEC — SIGNIFICANT CHANGE UP (ref 9.8–12.7)
RBC # BLD: 2.91 M/UL — LOW (ref 4.2–5.8)
RBC # FLD: 19.6 % — HIGH (ref 10.3–14.5)
SODIUM SERPL-SCNC: 135 MMOL/L — SIGNIFICANT CHANGE UP (ref 135–145)
WBC # BLD: 7.8 K/UL — SIGNIFICANT CHANGE UP (ref 3.8–10.5)
WBC # FLD AUTO: 7.8 K/UL — SIGNIFICANT CHANGE UP (ref 3.8–10.5)

## 2017-11-02 PROCEDURE — 99233 SBSQ HOSP IP/OBS HIGH 50: CPT

## 2017-11-02 PROCEDURE — 93750 INTERROGATION VAD IN PERSON: CPT

## 2017-11-02 PROCEDURE — 90832 PSYTX W PT 30 MINUTES: CPT

## 2017-11-02 RX ORDER — SOD SULF/SODIUM/NAHCO3/KCL/PEG
4000 SOLUTION, RECONSTITUTED, ORAL ORAL ONCE
Qty: 0 | Refills: 0 | Status: DISCONTINUED | OUTPATIENT
Start: 2017-11-02 | End: 2017-11-02

## 2017-11-02 RX ADMIN — Medication 650 MILLIGRAM(S): at 11:57

## 2017-11-02 RX ADMIN — MEXILETINE HYDROCHLORIDE 150 MILLIGRAM(S): 150 CAPSULE ORAL at 15:03

## 2017-11-02 RX ADMIN — Medication 25 MILLIGRAM(S): at 15:03

## 2017-11-02 RX ADMIN — Medication 1 MILLIGRAM(S): at 11:56

## 2017-11-02 RX ADMIN — CARVEDILOL PHOSPHATE 12.5 MILLIGRAM(S): 80 CAPSULE, EXTENDED RELEASE ORAL at 06:50

## 2017-11-02 RX ADMIN — Medication 0.6 MILLIGRAM(S): at 17:09

## 2017-11-02 RX ADMIN — Medication 20 MILLIGRAM(S): at 11:56

## 2017-11-02 RX ADMIN — QUETIAPINE FUMARATE 50 MILLIGRAM(S): 200 TABLET, FILM COATED ORAL at 11:56

## 2017-11-02 RX ADMIN — AMIODARONE HYDROCHLORIDE 200 MILLIGRAM(S): 400 TABLET ORAL at 06:50

## 2017-11-02 RX ADMIN — SPIRONOLACTONE 25 MILLIGRAM(S): 25 TABLET, FILM COATED ORAL at 07:28

## 2017-11-02 RX ADMIN — MEXILETINE HYDROCHLORIDE 150 MILLIGRAM(S): 150 CAPSULE ORAL at 21:40

## 2017-11-02 RX ADMIN — PANTOPRAZOLE SODIUM 40 MILLIGRAM(S): 20 TABLET, DELAYED RELEASE ORAL at 17:09

## 2017-11-02 RX ADMIN — Medication 500 MILLIGRAM(S): at 11:56

## 2017-11-02 RX ADMIN — Medication 0.6 MILLIGRAM(S): at 06:50

## 2017-11-02 RX ADMIN — CARVEDILOL PHOSPHATE 12.5 MILLIGRAM(S): 80 CAPSULE, EXTENDED RELEASE ORAL at 17:09

## 2017-11-02 RX ADMIN — Medication 25 MILLIGRAM(S): at 06:50

## 2017-11-02 RX ADMIN — Medication 325 MILLIGRAM(S): at 11:56

## 2017-11-02 RX ADMIN — Medication 25 MILLIGRAM(S): at 21:40

## 2017-11-02 RX ADMIN — MEXILETINE HYDROCHLORIDE 150 MILLIGRAM(S): 150 CAPSULE ORAL at 06:50

## 2017-11-02 RX ADMIN — PANTOPRAZOLE SODIUM 40 MILLIGRAM(S): 20 TABLET, DELAYED RELEASE ORAL at 06:50

## 2017-11-02 NOTE — PROGRESS NOTE ADULT - PROBLEM SELECTOR PROBLEM 2
LVAD (left ventricular assist device) present

## 2017-11-02 NOTE — PROGRESS NOTE ADULT - ASSESSMENT
68 y/o man with h/o Chronic Systolic Heart Failure (ACC/AHA Stage D) due to NICMP s/p LVAD 6/12/17, GIB s/p Cautery (7/25/2017), known AV Malformations, now Chronic Anemia due to numerous GIBs (off AC), A-Fib, VT s/p AICD who presented to the ED with melena, found to have an H/H of 6/19.6. Transfused 2U PRBC with much symptomatic improvement.  10/31 Repeat H/H 7.9/25.1, GI following.  11/1 capsule study  pending   11/2 no active issues

## 2017-11-02 NOTE — PROGRESS NOTE ADULT - ASSESSMENT
Sleeping well at night; taking short naps during day.  Appetite fair. Mood euthymic.  Appears to be coping well with rehospitalization and current GI issues.  Attributes his ability to cope so well with his Scientologist pastora and strong support system (family, friends, staff).  Has strong support from brother and close friend and has been more willing to ask others for help and support.  Able to teach back information on LVAD management.      DX:  Systolic heart failure; r/o Adjustment disorder     Recommendations:   Behavioral Cardiology will continue to follow

## 2017-11-02 NOTE — PROGRESS NOTE ADULT - ASSESSMENT
68 y/o man with h/o Chronic Systolic Heart Failure (ACC/AHA Stage D) due to NICMP s/p LVAD 6/12/17, GIB s/p Cautery (7/25/2017), known AV Malformations, now Chronic Anemia due to numerous GIBs (off AC), A-Fib, VT s/p AICD who presented to the ED with melena, found to have an H/H of 6/19.6. Transfused 2U PRBC with much symptomatic improvement.  10/31 Repeat H/H 7.9/25.1, GI following. Awaiting Capsule endoscopy results

## 2017-11-02 NOTE — PROGRESS NOTE ADULT - ATTENDING COMMENTS
Marga Martines, PhD  [phone]
Marga Martines, PhD  [phone]
F/u capsule endoscopy.  Arranging transplant eval by [facility].

## 2017-11-02 NOTE — PROGRESS NOTE ADULT - SUBJECTIVE AND OBJECTIVE BOX
VITAL SIGNS    Telemetry:  SR 70-90 with 11 beats WCT   Vital Signs Last 24 Hrs  T(C): 36.7 (17 @ 04:00), Max: 36.7 (17 @ 21:05)  T(F): 98 (17 @ 04:00), Max: 98 (17 @ 21:05)  HR: 84 (17 @ 04:00) (69 - 84)  BP: 94/71 (17 @ 17:57) (94/71 - 104/73)  RR: 16 (17 @ 04:19) (16 - 18)  SpO2: 94% (17 @ 04:19) (94% - 100%)             @ 07:01  -   @ 07:00  --------------------------------------------------------  IN: 600 mL / OUT: 850 mL / NET: -250 mL     @ 07:01  -   @ 13:48  --------------------------------------------------------  IN: 360 mL / OUT: 0 mL / NET: 360 mL       Daily     Daily Weight in k.4 (2017 07:42)  Admit Wt: Drug Dosing Weight  Height (cm): 172.72 (30 Oct 2017 23:55)  Weight (kg): 69.1 (30 Oct 2017 23:55)  BMI (kg/m2): 23.2 (30 Oct 2017 23:55)  BSA (m2): 1.82 (30 Oct 2017 23:55)  MEDICATIONS  acetaminophen   Tablet 650 milliGRAM(s) Oral every 6 hours PRN  amiodarone    Tablet 200 milliGRAM(s) Oral daily  ascorbic acid 500 milliGRAM(s) Oral daily  carvedilol 12.5 milliGRAM(s) Oral every 12 hours  colchicine 0.6 milliGRAM(s) Oral two times a day  ferrous    sulfate 325 milliGRAM(s) Oral daily  folic acid 1 milliGRAM(s) Oral daily  furosemide    Tablet 20 milliGRAM(s) Oral every other day  hydrALAZINE 25 milliGRAM(s) Oral every 8 hours  mexiletine 150 milliGRAM(s) Oral every 8 hours  pantoprazole  Injectable 40 milliGRAM(s) IV Push every 12 hours  QUEtiapine 50 milliGRAM(s) Oral daily  spironolactone 25 milliGRAM(s) Oral daily      PHYSICAL EXAM    Subjective: no complaints   Neurology: alert and oriented x 3, nonfocal, no gross deficits  CV : s1s1 reg no MRGS  no JVD + lvad humm   Sternal Wound :  CDI , Stable healed drive line CDI   Lungs: CTA, equal chest expansion  Abdomen: soft, nontender, nondistended, positive bowel sounds, last bowel movement   :    voids  Extremities:  no   edema,  +dp b/l , no calf tenderness b/l , warm and perfused b/l    LABS      135  |  105  |  15  ----------------------------<  95  4.0   |  17<L>  |  1.23    Ca    8.4      2017 04:47    TPro  6.6  /  Alb  3.5  /  TBili  0.5  /  DBili  x   /  AST  48<H>  /  ALT  57<H>  /  AlkPhos  88                                   8.5    7.8   )-----------( 239      ( 2017 04:47 )             26.5          PT/INR - ( 2017 04:47 )   PT: 12.3 sec;   INR: 1.13 ratio         PTT - ( 31 Oct 2017 14:17 )  PTT:27.7 sec         PAST MEDICAL & SURGICAL HISTORY:  GIB (gastrointestinal bleeding)  Ventricular fibrillation: s/p AICD  PAF (paroxysmal atrial fibrillation): on xarelto  Non-Ischemic Cardiomyopathy  SVT (Supraventricular Tachycardia)  HTN  CHF (Congestive Heart Failure)  LVAD (left ventricular assist device) present  Status post left hip replacement  History of Prior Ablation Treatment: for afib  AICD (Automatic Cardioverter/Defibrillator) Present: [employer] with 1 [employer] lead09- explanted and replaced with [employer] 2 leads on 09       Physical Therapy Rec:   Home  [  ]   Home w/ PT  [  ]  Rehab  [  ]

## 2017-11-02 NOTE — PROGRESS NOTE ADULT - PROBLEM SELECTOR PLAN 1
C/W Protonix 40mg IV Q12h. reg diet after capsule study    Monitor H/H. Daily CBC  C/w iron, folate, vitamin C.  awaiting capsule study results

## 2017-11-02 NOTE — PROGRESS NOTE ADULT - SUBJECTIVE AND OBJECTIVE BOX
HPI:  67M PMH Chronic Systolic Heart Failure (ACC/AHA Stage D) due to NICMP s/p LVAD 17, GIB s/p Cautery (2017), known AV Malformations, now Chronic Anemia due to numerous GIBs (off AC), A-Fib, VT s/p AICD who presented to the ED after he has felt weak, dyspneic and dizzy x 3 days and discovered he had dark tarry stools 1 day ago. He called the LVAD team who recommended that he come to [facility] ED for evaluation of anemia and potential transfusion. Denies fever, chills, n/v/d, chest pain. (31 Oct 2017 01:41)  Awaiting results of capsule endoscopy.  Patient feel better today, ambulating    PMH:   GIB (gastrointestinal bleeding)  H/O prior ablation treatment  Ventricular fibrillation  PAF (paroxysmal atrial fibrillation)  Non-Ischemic Cardiomyopathy  SVT (Supraventricular Tachycardia)  HTN  CHF (Congestive Heart Failure)    PSH:   LVAD (left ventricular assist device) present  Status post left hip replacement  Hypertension  Hypertension  History of Prior Ablation Treatment  AICD (Automatic Cardioverter/Defibrillator) Present    Medications:   acetaminophen   Tablet 650 milliGRAM(s) Oral every 6 hours PRN  amiodarone    Tablet 200 milliGRAM(s) Oral daily  ascorbic acid 500 milliGRAM(s) Oral daily  carvedilol 12.5 milliGRAM(s) Oral every 12 hours  colchicine 0.6 milliGRAM(s) Oral two times a day  ferrous    sulfate 325 milliGRAM(s) Oral daily  folic acid 1 milliGRAM(s) Oral daily  furosemide    Tablet 20 milliGRAM(s) Oral every other day  hydrALAZINE 25 milliGRAM(s) Oral every 8 hours  mexiletine 150 milliGRAM(s) Oral every 8 hours  pantoprazole  Injectable 40 milliGRAM(s) IV Push every 12 hours  QUEtiapine 50 milliGRAM(s) Oral daily  spironolactone 25 milliGRAM(s) Oral daily    Allergies:  No Known Allergies    FAMILY HISTORY:  No pertinent family history in first degree relatives    LVAD Interrogation: no alarms  Pump Flow: 4.6-5.6  Pump Speed: 9200  Pulse Index: 6.1-6.4  Pump Power: 5.3-5.7  VAD Events: none  Driveline evaluation:  C/D/I  Programming Changes: [X ] No changes made [ ] Changes made:        Review of Systems:  Constitutional: [ ] Fever [ ] Chills [ ] Fatigue [ ] Weight change   HEENT: [ ] Blurred vision [ ] Eye Pain [ ] Headache [ ] Runny nose [ ] Sore Throat   Respiratory: [ ] Cough [ ] Wheezing [ ] Shortness of breath  Cardiovascular: [ ] Chest Pain [ ] Palpitations [ ] LOBATO [ ] PND [ ] Orthopnea  Gastrointestinal: [ ] Abdominal Pain [ ] Diarrhea [ ] Constipation [ ] Hemorrhoids [ ] Nausea [ ] Vomiting  Genitourinary: [ ] Nocturia [ ] Dysuria [ ] Incontinence  Extremities: [ ] Swelling [ ] Joint Pain  Neurologic: [ ] Focal deficit [ ] Paresthesias [ ] Syncope  Lymphatic: [ ] Swelling [ ] Lymphadenopathy   Skin: [ ] Rash [ ] Ecchymoses [ ] Wounds [ ] Lesions  Psychiatry: [ ] Depression [ ] Suicidal/Homicidal Ideation [ ] Anxiety [ ] Sleep Disturbances  [X ] 10 point review of systems is otherwise negative except as mentioned above            [ ]Unable to obtain    Physical Exam:  T(C): 36.7 (17 @ 04:00), Max: 36.7 (17 @ 21:05)  HR: 84 (17 @ 04:00) (84 - 84)  MAP: 76-80  RR: 16 (17 @ 04:19) (16 - 16)  SpO2: 94% (17 @ 04:19) (94% - 94%)  Wt(kg): --  I&O's Detail    2017 07:  -  2017 07:00  --------------------------------------------------------  IN:    Oral Fluid: 600 mL  Total IN: 600 mL    OUT:    Voided: 850 mL  Total OUT: 850 mL    Total NET: -250 mL      2017 07:  -  2017 17:57  --------------------------------------------------------  IN:    Oral Fluid: 360 mL  Total IN: 360 mL    OUT:    Voided: 100 mL  Total OUT: 100 mL    Total NET: 260 mL          Daily Weight in k.4 (2017 07:42), Weight in k.8 (31 Oct 2017 14:30), Weight in k.9 (31 Oct 2017 08:39)    Appearance: [X ] Normal [ ] NAD  Eyes: [X ] PERRL [ ] EOMI  HENT: [X ] Normal oral mus[place]a [ ]NC/AT  Cardiovascular: [ ] S1 [ ] S2 [ ] RRR [ ] No m/r/g [ ]No edema [ ] JVP, +hum of VAD  Respiratory: [X ] Clear to auscultation bilaterally  Gastrointestinal: [X ] Soft [X ] Non-tender [X ] Non-distended [X ] BS+  Musculoskeletal: [X ] No clubbing [ ] No joint deformity   Neurologic: [X ] Non-focal  Lymphatic: [X ] No lymphadenopathy  Psychiatry: [X ] AAOx3 [ ] Mood & affect appropriate  Skin: [X ] No rashes [ ] No ecchymoses [ ] No cyanosis    Labs:                        8.5    7.8   )-----------( 239      ( 2017 04:47 )             26.5     11-02    135  |  105  |  15  ----------------------------<  95  4.0   |  17<L>  |  1.23    Ca    8.4      2017 04:47    TPro  6.6  /  Alb  3.5  /  TBili  0.5  /  DBili  x   /  AST  48<H>  /  ALT  57<H>  /  AlkPhos  88  11-    PT/INR - ( 2017 04:47 )   PT: 12.3 sec;   INR: 1.13 ratio      LDH: 218

## 2017-11-02 NOTE — CHART NOTE - NSCHARTNOTEFT_GEN_A_CORE
GI Attending:  Blood in cecum on capsule endoscopy  Will prep for colonoscopy on 11/3/17  NPO after MN except for prep and meds GI Attending:  Blood in cecum on capsule endoscopy  Will prep for colonoscopy on 11/3/17  NPO after MN except for prep and meds    GI Attending Addendum:  Capsule endoscopy video reviewed, report finalized and given to patient.  Small blood clot in colon, amongst a large amount of nonbloody stool.  Discussed with cardiologist Dr. Júnior Baez, who plans to transfer pt for heart transplant evaluation.  Colonoscopy is the usual recommended workup.  Pt's bleeding has resolved clinically, with nonbloody stool and stable Hb today.  Patient may transfer to outside hospital from GI perspective, colonoscopy is nonurgent.  Patient states he had recent colonoscopy within the past 1-2 years, but has never had it done at [facility] according to our records.  Discussed with 2 Gonzalo NP.

## 2017-11-02 NOTE — PROGRESS NOTE ADULT - PROBLEM SELECTOR PROBLEM 1
Gastrointestinal hemorrhage with melena

## 2017-11-02 NOTE — PROGRESS NOTE ADULT - SUBJECTIVE AND OBJECTIVE BOX
Behavioral Cardiology Progress Note     HPI:  Mr. Baez is a 67 year old man with chronic systolic heart failure, ACC/AHA stage D, due to nonischemic cardiomyopathy, s/p LVAD 6/12/17, GIB s/p cautery 7/25/17, known AV malformation, now  chronic anemia numerous GIBs (off AC), Afib, VT s/p AICD, who presented to ED with melena, found to have H/H 6/19.6.  Transfused 2U PRBC.  Team awaiting Capsule endoscopy results.    Behavioral Health Assessment:   Mood:  "I’m doing good."            Current stressors:   Readmission to hospital   Adjustment to living with LVAD      Support system/family support: Good support from family and close friend      Coping strategies: Talking with family and staff, watching TV; thinking about his granddaughter     Understanding of medical illness and treatment plan:  Understands reasons for this admission and treatment plan.  Good understanding of LVAD management; benefiting from ongoing education and review.      MSE: Pt seen resting in bed.  A&Ox3.  Well related with good eye contact.  Thought process goal directed.  No abnormal thought content; denies s/i.  Mood upbeat.  Affect full range.  Insight and judgment adequate.

## 2017-11-03 VITALS — HEART RATE: 86 BPM | TEMPERATURE: 98 F | OXYGEN SATURATION: 100 % | RESPIRATION RATE: 16 BRPM

## 2017-11-03 RX ORDER — FERROUS SULFATE 325(65) MG
1 TABLET ORAL
Qty: 0 | Refills: 0 | COMMUNITY
Start: 2017-11-03

## 2017-11-03 RX ORDER — HYDRALAZINE HCL 50 MG
1 TABLET ORAL
Qty: 0 | Refills: 0 | COMMUNITY
Start: 2017-11-03

## 2017-11-03 RX ORDER — MEXILETINE HYDROCHLORIDE 150 MG/1
1 CAPSULE ORAL
Qty: 0 | Refills: 0 | COMMUNITY
Start: 2017-11-03

## 2017-11-03 RX ORDER — CARVEDILOL PHOSPHATE 80 MG/1
1 CAPSULE, EXTENDED RELEASE ORAL
Qty: 0 | Refills: 0 | COMMUNITY
Start: 2017-11-03

## 2017-11-03 RX ORDER — SPIRONOLACTONE 25 MG/1
1 TABLET, FILM COATED ORAL
Qty: 0 | Refills: 0 | COMMUNITY
Start: 2017-11-03

## 2017-11-03 RX ORDER — COLCHICINE 0.6 MG
1 TABLET ORAL
Qty: 0 | Refills: 0 | COMMUNITY
Start: 2017-11-03

## 2017-11-03 RX ORDER — FUROSEMIDE 40 MG
1 TABLET ORAL
Qty: 0 | Refills: 0 | COMMUNITY
Start: 2017-11-03

## 2017-11-03 RX ORDER — AMIODARONE HYDROCHLORIDE 400 MG/1
1 TABLET ORAL
Qty: 0 | Refills: 0 | COMMUNITY
Start: 2017-11-03

## 2017-11-03 RX ORDER — ASCORBIC ACID 60 MG
1 TABLET,CHEWABLE ORAL
Qty: 0 | Refills: 0 | COMMUNITY
Start: 2017-11-03

## 2017-11-03 RX ORDER — FOLIC ACID 0.8 MG
1 TABLET ORAL
Qty: 0 | Refills: 0 | COMMUNITY
Start: 2017-11-03

## 2017-11-03 RX ORDER — QUETIAPINE FUMARATE 200 MG/1
1 TABLET, FILM COATED ORAL
Qty: 0 | Refills: 0 | COMMUNITY
Start: 2017-11-03

## 2017-11-03 RX ORDER — ACETAMINOPHEN 500 MG
2 TABLET ORAL
Qty: 0 | Refills: 0 | COMMUNITY
Start: 2017-11-03

## 2017-11-03 NOTE — DISCHARGE NOTE ADULT - ADDITIONAL INSTRUCTIONS
Please follow up with Dr. James for a colonoscopy as an outpatient by calling his office listed below.

## 2017-11-03 NOTE — DISCHARGE NOTE ADULT - CARE PLAN
Principal Discharge DX:	Chronic systolic congestive heart failure  Goal:	Full recovery  Instructions for follow-up, activity and diet:	Patient transferred to [facility] where he will be evaluated for a heart transplant.  Please continue taking amiodarone, coreg, colchicine, lasix, aldactone, hydralazine, mexiletine, iron, folate, vitamin C, protonix, seroquel as prescribed.  Please continue to use LVAD equipment as dictated by the LVAD team.

## 2017-11-03 NOTE — DISCHARGE NOTE ADULT - MEDICATION SUMMARY - MEDICATIONS TO TAKE
I will START or STAY ON the medications listed below when I get home from the hospital:    spironolactone 25 mg oral tablet  -- 1 tab(s) by mouth once a day  -- Indication: For CHF    acetaminophen 325 mg oral tablet  -- 2 tab(s) by mouth every 6 hours, As needed, Moderate Pain (4 - 6)  -- Indication: For Pain control    amiodarone 200 mg oral tablet  -- 1 tab(s) by mouth once a day  -- Indication: For Heart rhythm    mexiletine 150 mg oral capsule  -- 1 cap(s) by mouth every 8 hours  -- Indication: For Heart rhythm    colchicine 0.6 mg oral tablet  -- 1 tab(s) by mouth 2 times a day  -- Indication: For Joints    QUEtiapine 50 mg oral tablet  -- 1 tab(s) by mouth once a day  -- Indication: For Mood    carvedilol 12.5 mg oral tablet  -- 1 tab(s) by mouth every 12 hours  -- Indication: For CHF    furosemide 20 mg oral tablet  -- 1 tab(s) by mouth every other day  -- Indication: For CHF    FeroSul 325 mg (65 mg elemental iron) oral tablet  -- 1  by mouth 3 times a day  -- Indication: For Anemia    pantoprazole 40 mg oral delayed release tablet  -- 1 tab(s) by mouth once a day  -- Indication: For Stomach protection    hydrALAZINE 25 mg oral tablet  -- 1 tab(s) by mouth every 8 hours  -- Indication: For CHF    ascorbic acid 500 mg oral tablet  -- 1 tab(s) by mouth once a day  -- Indication: For Anemia    folic acid 1 mg oral tablet  -- 1 tab(s) by mouth once a day  -- Indication: For Anemia

## 2017-11-03 NOTE — DISCHARGE NOTE ADULT - MEDICATION SUMMARY - MEDICATIONS TO STOP TAKING
I will STOP taking the medications listed below when I get home from the hospital:    hydrocortisone 1% topical cream  -- 1 application on skin 2 times a day    lidocaine 5% topical film  -- Apply on skin to affected area once a day    Rt Knee ROM hinge brace  -- dx: Rt knee OA

## 2017-11-03 NOTE — DISCHARGE NOTE ADULT - HOSPITAL COURSE
67M PMH NICM s/p AICD, s/p LVAD 6/12/17 and history of prior GI bleeds ascribed to small bowel angioectasias presenting with melena x1 week with progressive fatigue and weakness at home presented to [facility] and was found to be anemic with H/H 6.0/19.6. He was transfused 2 U pRBCs with post-transfusion hgb is 7.8/23.4. Patient states he feels much better. Dr. James from GI following, performed a capsule enteroscopy on 11/1/17 which showed a small blood clot in the colon amongst a large amount of nonbloody stool as per Dr. James, who discussed these findings with Dr. Avitia who planned to transfer patient for heart transplant evaluation at [facility] and decided that since the bleeding has resolved clinically with nonbloody stool and a stable H/H, the patient was stable for transfer. Patient recommended for nonurgent outpatient colonoscopy. Patient discharged on 11/3/17 for heart transplant evaluation at [facility].

## 2017-11-03 NOTE — DISCHARGE NOTE ADULT - CARE PROVIDER_API CALL
Quentin James), Gastroenterology; Internal Medicine  Division of Gastroenterology  [address] [place] [address]  Phone: [phone]  Fax: [phone]

## 2017-11-03 NOTE — DISCHARGE NOTE ADULT - PLAN OF CARE
Full recovery Patient transferred to [facility] where he will be evaluated for a heart transplant.  Please continue taking amiodarone, coreg, colchicine, lasix, aldactone, hydralazine, mexiletine, iron, folate, vitamin C, protonix, seroquel as prescribed.  Please continue to use LVAD equipment as dictated by the LVAD team.

## 2017-11-08 ENCOUNTER — APPOINTMENT (OUTPATIENT)
Dept: ELECTROPHYSIOLOGY | Facility: CLINIC | Age: 67
End: 2017-11-08

## 2017-11-14 ENCOUNTER — RESULT REVIEW (OUTPATIENT)
Age: 67
End: 2017-11-14

## 2017-11-16 ENCOUNTER — APPOINTMENT (OUTPATIENT)
Dept: CARDIOLOGY | Facility: CLINIC | Age: 67
End: 2017-11-16
Payer: MEDICARE

## 2017-11-16 VITALS
HEIGHT: 68 IN | OXYGEN SATURATION: 100 % | TEMPERATURE: 208.4 F | HEART RATE: 76 BPM | DIASTOLIC BLOOD PRESSURE: 79 MMHG | WEIGHT: 164 LBS | SYSTOLIC BLOOD PRESSURE: 95 MMHG | RESPIRATION RATE: 15 BRPM | BODY MASS INDEX: 24.86 KG/M2

## 2017-11-16 PROCEDURE — 93750 INTERROGATION VAD IN PERSON: CPT

## 2017-11-16 PROCEDURE — 99215 OFFICE O/P EST HI 40 MIN: CPT

## 2017-11-16 PROCEDURE — 93000 ELECTROCARDIOGRAM COMPLETE: CPT

## 2017-11-22 ENCOUNTER — RESULT REVIEW (OUTPATIENT)
Age: 67
End: 2017-11-22

## 2017-11-28 ENCOUNTER — RESULT REVIEW (OUTPATIENT)
Age: 67
End: 2017-11-28

## 2017-11-30 ENCOUNTER — APPOINTMENT (OUTPATIENT)
Dept: CARDIOLOGY | Facility: CLINIC | Age: 67
End: 2017-11-30
Payer: MEDICARE

## 2017-11-30 ENCOUNTER — NON-APPOINTMENT (OUTPATIENT)
Age: 67
End: 2017-11-30

## 2017-11-30 PROCEDURE — 93000 ELECTROCARDIOGRAM COMPLETE: CPT

## 2017-11-30 PROCEDURE — 99215 OFFICE O/P EST HI 40 MIN: CPT

## 2017-11-30 PROCEDURE — 93750 INTERROGATION VAD IN PERSON: CPT

## 2017-11-30 RX ORDER — WARFARIN 3 MG/1
3 TABLET ORAL DAILY
Qty: 30 | Refills: 0 | Status: DISCONTINUED | COMMUNITY
Start: 2017-09-05 | End: 2017-11-30

## 2017-11-30 RX ORDER — PANTOPRAZOLE 40 MG/1
40 TABLET, DELAYED RELEASE ORAL
Qty: 90 | Refills: 3 | Status: DISCONTINUED | COMMUNITY
Start: 2017-10-26 | End: 2017-11-30

## 2017-11-30 RX ORDER — WARFARIN 1 MG/1
1 TABLET ORAL DAILY
Qty: 90 | Refills: 3 | Status: DISCONTINUED | COMMUNITY
Start: 2017-08-11 | End: 2017-11-30

## 2017-11-30 RX ORDER — WARFARIN 1 MG/1
1 TABLET ORAL
Qty: 90 | Refills: 3 | Status: DISCONTINUED | COMMUNITY
Start: 2017-09-12 | End: 2017-11-30

## 2017-12-08 ENCOUNTER — RX RENEWAL (OUTPATIENT)
Age: 67
End: 2017-12-08

## 2017-12-12 ENCOUNTER — RESULT REVIEW (OUTPATIENT)
Age: 67
End: 2017-12-12

## 2017-12-18 ENCOUNTER — RESULT REVIEW (OUTPATIENT)
Age: 67
End: 2017-12-18

## 2017-12-19 PROCEDURE — 85027 COMPLETE CBC AUTOMATED: CPT

## 2017-12-19 PROCEDURE — 85014 HEMATOCRIT: CPT

## 2017-12-19 PROCEDURE — 83036 HEMOGLOBIN GLYCOSYLATED A1C: CPT

## 2017-12-19 PROCEDURE — 83615 LACTATE (LD) (LDH) ENZYME: CPT

## 2017-12-19 PROCEDURE — P9016: CPT

## 2017-12-19 PROCEDURE — 86850 RBC ANTIBODY SCREEN: CPT

## 2017-12-19 PROCEDURE — 85610 PROTHROMBIN TIME: CPT

## 2017-12-19 PROCEDURE — 82803 BLOOD GASES ANY COMBINATION: CPT

## 2017-12-19 PROCEDURE — 73562 X-RAY EXAM OF KNEE 3: CPT

## 2017-12-19 PROCEDURE — 93306 TTE W/DOPPLER COMPLETE: CPT

## 2017-12-19 PROCEDURE — 85730 THROMBOPLASTIN TIME PARTIAL: CPT

## 2017-12-19 PROCEDURE — 80048 BASIC METABOLIC PNL TOTAL CA: CPT

## 2017-12-19 PROCEDURE — 84100 ASSAY OF PHOSPHORUS: CPT

## 2017-12-19 PROCEDURE — 86923 COMPATIBILITY TEST ELECTRIC: CPT

## 2017-12-19 PROCEDURE — 81003 URINALYSIS AUTO W/O SCOPE: CPT

## 2017-12-19 PROCEDURE — 82947 ASSAY GLUCOSE BLOOD QUANT: CPT

## 2017-12-19 PROCEDURE — 86900 BLOOD TYPING SEROLOGIC ABO: CPT

## 2017-12-19 PROCEDURE — 73110 X-RAY EXAM OF WRIST: CPT

## 2017-12-19 PROCEDURE — 80053 COMPREHEN METABOLIC PANEL: CPT

## 2017-12-19 PROCEDURE — 97161 PT EVAL LOW COMPLEX 20 MIN: CPT

## 2017-12-19 PROCEDURE — 82435 ASSAY OF BLOOD CHLORIDE: CPT

## 2017-12-19 PROCEDURE — 99285 EMERGENCY DEPT VISIT HI MDM: CPT | Mod: 25

## 2017-12-19 PROCEDURE — 83605 ASSAY OF LACTIC ACID: CPT

## 2017-12-19 PROCEDURE — 73130 X-RAY EXAM OF HAND: CPT

## 2017-12-19 PROCEDURE — 85611 PROTHROMBIN TEST: CPT

## 2017-12-19 PROCEDURE — 71045 X-RAY EXAM CHEST 1 VIEW: CPT

## 2017-12-19 PROCEDURE — 84550 ASSAY OF BLOOD/URIC ACID: CPT

## 2017-12-19 PROCEDURE — 86140 C-REACTIVE PROTEIN: CPT

## 2017-12-19 PROCEDURE — 82248 BILIRUBIN DIRECT: CPT

## 2017-12-19 PROCEDURE — 36430 TRANSFUSION BLD/BLD COMPNT: CPT

## 2017-12-19 PROCEDURE — 84436 ASSAY OF TOTAL THYROXINE: CPT

## 2017-12-19 PROCEDURE — 86901 BLOOD TYPING SEROLOGIC RH(D): CPT

## 2017-12-19 PROCEDURE — 82150 ASSAY OF AMYLASE: CPT

## 2017-12-19 PROCEDURE — 84132 ASSAY OF SERUM POTASSIUM: CPT

## 2017-12-19 PROCEDURE — 93005 ELECTROCARDIOGRAM TRACING: CPT

## 2017-12-19 PROCEDURE — 83735 ASSAY OF MAGNESIUM: CPT

## 2017-12-19 PROCEDURE — 84295 ASSAY OF SERUM SODIUM: CPT

## 2017-12-19 PROCEDURE — 84480 ASSAY TRIIODOTHYRONINE (T3): CPT

## 2017-12-19 PROCEDURE — 82330 ASSAY OF CALCIUM: CPT

## 2017-12-19 PROCEDURE — 85384 FIBRINOGEN ACTIVITY: CPT

## 2017-12-19 PROCEDURE — 84134 ASSAY OF PREALBUMIN: CPT

## 2017-12-19 PROCEDURE — 83010 ASSAY OF HAPTOGLOBIN QUANT: CPT

## 2017-12-19 PROCEDURE — 84443 ASSAY THYROID STIM HORMONE: CPT

## 2017-12-27 ENCOUNTER — RESULT REVIEW (OUTPATIENT)
Age: 67
End: 2017-12-27

## 2017-12-28 ENCOUNTER — OTHER (OUTPATIENT)
Age: 67
End: 2017-12-28

## 2017-12-29 ENCOUNTER — APPOINTMENT (OUTPATIENT)
Dept: ELECTROPHYSIOLOGY | Facility: CLINIC | Age: 67
End: 2017-12-29
Payer: MEDICARE

## 2017-12-29 PROCEDURE — 93283 PRGRMG EVAL IMPLANTABLE DFB: CPT

## 2018-01-01 NOTE — DIETITIAN INITIAL EVALUATION ADULT. - NUTRITION INTERVENTIONS
Feeding and  care were discussed today and parent questions were answered
Multivitamin; RD remains available, re-consult as needed. Marvin Agosto RD Pager #[phone]

## 2018-01-03 ENCOUNTER — RESULT REVIEW (OUTPATIENT)
Age: 68
End: 2018-01-03

## 2018-01-04 ENCOUNTER — APPOINTMENT (OUTPATIENT)
Dept: CV DIAGNOSITCS | Facility: HOSPITAL | Age: 68
End: 2018-01-04

## 2018-01-05 ENCOUNTER — RX RENEWAL (OUTPATIENT)
Age: 68
End: 2018-01-05

## 2018-01-10 ENCOUNTER — MEDICATION RENEWAL (OUTPATIENT)
Age: 68
End: 2018-01-10

## 2018-01-11 ENCOUNTER — OTHER (OUTPATIENT)
Age: 68
End: 2018-01-11

## 2018-01-11 ENCOUNTER — NON-APPOINTMENT (OUTPATIENT)
Age: 68
End: 2018-01-11

## 2018-01-11 ENCOUNTER — APPOINTMENT (OUTPATIENT)
Dept: CARDIOLOGY | Facility: CLINIC | Age: 68
End: 2018-01-11
Payer: MEDICARE

## 2018-01-11 ENCOUNTER — OUTPATIENT (OUTPATIENT)
Dept: OUTPATIENT SERVICES | Facility: HOSPITAL | Age: 68
LOS: 1 days | End: 2018-01-11
Payer: MEDICARE

## 2018-01-11 ENCOUNTER — APPOINTMENT (OUTPATIENT)
Dept: CV DIAGNOSITCS | Facility: HOSPITAL | Age: 68
End: 2018-01-11

## 2018-01-11 VITALS
SYSTOLIC BLOOD PRESSURE: 114 MMHG | DIASTOLIC BLOOD PRESSURE: 88 MMHG | WEIGHT: 169 LBS | TEMPERATURE: 98 F | RESPIRATION RATE: 14 BRPM | HEART RATE: 90 BPM | HEIGHT: 68 IN | OXYGEN SATURATION: 96 % | BODY MASS INDEX: 25.61 KG/M2

## 2018-01-11 DIAGNOSIS — Z95.811 PRESENCE OF HEART ASSIST DEVICE: ICD-10-CM

## 2018-01-11 DIAGNOSIS — Z95.811 PRESENCE OF HEART ASSIST DEVICE: Chronic | ICD-10-CM

## 2018-01-11 PROCEDURE — 93306 TTE W/DOPPLER COMPLETE: CPT | Mod: 26

## 2018-01-11 PROCEDURE — 99215 OFFICE O/P EST HI 40 MIN: CPT

## 2018-01-11 PROCEDURE — 93306 TTE W/DOPPLER COMPLETE: CPT

## 2018-01-11 PROCEDURE — 93750 INTERROGATION VAD IN PERSON: CPT

## 2018-01-11 PROCEDURE — 93000 ELECTROCARDIOGRAM COMPLETE: CPT

## 2018-01-11 RX ORDER — CARVEDILOL 6.25 MG/1
6.25 TABLET, FILM COATED ORAL
Qty: 60 | Refills: 3 | Status: DISCONTINUED | COMMUNITY
Start: 2017-09-07 | End: 2018-01-11

## 2018-01-11 RX ORDER — CARVEDILOL 12.5 MG/1
12.5 TABLET, FILM COATED ORAL TWICE DAILY
Qty: 180 | Refills: 3 | Status: DISCONTINUED | COMMUNITY
Start: 2017-10-26 | End: 2018-01-11

## 2018-01-16 ENCOUNTER — RESULT REVIEW (OUTPATIENT)
Age: 68
End: 2018-01-16

## 2018-01-23 ENCOUNTER — RESULT REVIEW (OUTPATIENT)
Age: 68
End: 2018-01-23

## 2018-01-31 ENCOUNTER — OTHER (OUTPATIENT)
Age: 68
End: 2018-01-31

## 2018-02-01 ENCOUNTER — APPOINTMENT (OUTPATIENT)
Dept: CARDIOTHORACIC SURGERY | Facility: CLINIC | Age: 68
End: 2018-02-01

## 2018-02-01 ENCOUNTER — INPATIENT (INPATIENT)
Facility: HOSPITAL | Age: 68
LOS: 67 days | Discharge: HOME | DRG: 1 | End: 2018-04-10
Attending: THORACIC SURGERY (CARDIOTHORACIC VASCULAR SURGERY) | Admitting: THORACIC SURGERY (CARDIOTHORACIC VASCULAR SURGERY)
Payer: MEDICARE

## 2018-02-01 ENCOUNTER — LABORATORY RESULT (OUTPATIENT)
Age: 68
End: 2018-02-01

## 2018-02-01 ENCOUNTER — OTHER (OUTPATIENT)
Age: 68
End: 2018-02-01

## 2018-02-01 VITALS
HEART RATE: 89 BPM | WEIGHT: 173.94 LBS | RESPIRATION RATE: 18 BRPM | SYSTOLIC BLOOD PRESSURE: 109 MMHG | DIASTOLIC BLOOD PRESSURE: 84 MMHG | TEMPERATURE: 98 F | OXYGEN SATURATION: 96 % | HEIGHT: 68 IN

## 2018-02-01 DIAGNOSIS — Z95.811 PRESENCE OF HEART ASSIST DEVICE: ICD-10-CM

## 2018-02-01 DIAGNOSIS — R74.0 NONSPECIFIC ELEVATION OF LEVELS OF TRANSAMINASE AND LACTIC ACID DEHYDROGENASE [LDH]: ICD-10-CM

## 2018-02-01 DIAGNOSIS — I48.0 PAROXYSMAL ATRIAL FIBRILLATION: ICD-10-CM

## 2018-02-01 DIAGNOSIS — Z95.811 PRESENCE OF HEART ASSIST DEVICE: Chronic | ICD-10-CM

## 2018-02-01 DIAGNOSIS — I10 ESSENTIAL (PRIMARY) HYPERTENSION: ICD-10-CM

## 2018-02-01 DIAGNOSIS — I50.9 HEART FAILURE, UNSPECIFIED: ICD-10-CM

## 2018-02-01 LAB
ALBUMIN SERPL ELPH-MCNC: 3.9 G/DL — SIGNIFICANT CHANGE UP (ref 3.3–5)
ALP SERPL-CCNC: 86 U/L — SIGNIFICANT CHANGE UP (ref 40–120)
ALT FLD-CCNC: 29 U/L RC — SIGNIFICANT CHANGE UP (ref 10–45)
ANION GAP SERPL CALC-SCNC: 9 MMOL/L — SIGNIFICANT CHANGE UP (ref 5–17)
APTT BLD: 32.6 SEC — SIGNIFICANT CHANGE UP (ref 27.5–37.4)
AST SERPL-CCNC: 85 U/L — HIGH (ref 10–40)
BILIRUB SERPL-MCNC: 0.5 MG/DL — SIGNIFICANT CHANGE UP (ref 0.2–1.2)
BLD GP AB SCN SERPL QL: NEGATIVE — SIGNIFICANT CHANGE UP
BUN SERPL-MCNC: 26 MG/DL — HIGH (ref 7–23)
CALCIUM SERPL-MCNC: 9 MG/DL — SIGNIFICANT CHANGE UP (ref 8.4–10.5)
CHLORIDE SERPL-SCNC: 103 MMOL/L — SIGNIFICANT CHANGE UP (ref 96–108)
CO2 SERPL-SCNC: 25 MMOL/L — SIGNIFICANT CHANGE UP (ref 22–31)
CREAT SERPL-MCNC: 1.43 MG/DL — HIGH (ref 0.5–1.3)
GLUCOSE SERPL-MCNC: 111 MG/DL — HIGH (ref 70–99)
HCT VFR BLD CALC: 33.5 % — LOW (ref 39–50)
HGB BLD-MCNC: 11.4 G/DL — LOW (ref 13–17)
INR BLD: 1.29 RATIO — HIGH (ref 0.88–1.16)
MCHC RBC-ENTMCNC: 33 PG — SIGNIFICANT CHANGE UP (ref 27–34)
MCHC RBC-ENTMCNC: 33.9 GM/DL — SIGNIFICANT CHANGE UP (ref 32–36)
MCV RBC AUTO: 97.3 FL — SIGNIFICANT CHANGE UP (ref 80–100)
PLATELET # BLD AUTO: 207 K/UL — SIGNIFICANT CHANGE UP (ref 150–400)
POTASSIUM SERPL-MCNC: 4.2 MMOL/L — SIGNIFICANT CHANGE UP (ref 3.5–5.3)
POTASSIUM SERPL-MCNC: 7.3 MMOL/L — CRITICAL HIGH (ref 3.5–5.3)
POTASSIUM SERPL-SCNC: 4.2 MMOL/L — SIGNIFICANT CHANGE UP (ref 3.5–5.3)
POTASSIUM SERPL-SCNC: 7.3 MMOL/L — CRITICAL HIGH (ref 3.5–5.3)
PROT SERPL-MCNC: 8.3 G/DL — SIGNIFICANT CHANGE UP (ref 6–8.3)
PROTHROM AB SERPL-ACNC: 14 SEC — HIGH (ref 9.8–12.7)
RBC # BLD: 3.45 M/UL — LOW (ref 4.2–5.8)
RBC # FLD: 17.5 % — HIGH (ref 10.3–14.5)
RH IG SCN BLD-IMP: POSITIVE — SIGNIFICANT CHANGE UP
SODIUM SERPL-SCNC: 137 MMOL/L — SIGNIFICANT CHANGE UP (ref 135–145)
WBC # BLD: 8.7 K/UL — SIGNIFICANT CHANGE UP (ref 3.8–10.5)
WBC # FLD AUTO: 8.7 K/UL — SIGNIFICANT CHANGE UP (ref 3.8–10.5)

## 2018-02-01 PROCEDURE — 33980 REMOVE INTRACORPOREAL DEVICE: CPT | Mod: 80

## 2018-02-01 PROCEDURE — 33945 TRANSPLANTATION OF HEART: CPT | Mod: 80

## 2018-02-01 RX ORDER — ACETAMINOPHEN 500 MG
650 TABLET ORAL EVERY 6 HOURS
Qty: 0 | Refills: 0 | Status: DISCONTINUED | OUTPATIENT
Start: 2018-02-01 | End: 2018-02-23

## 2018-02-01 RX ORDER — QUETIAPINE FUMARATE 200 MG/1
50 TABLET, FILM COATED ORAL AT BEDTIME
Qty: 0 | Refills: 0 | Status: DISCONTINUED | OUTPATIENT
Start: 2018-02-01 | End: 2018-02-23

## 2018-02-01 RX ORDER — AMIODARONE HYDROCHLORIDE 400 MG/1
200 TABLET ORAL DAILY
Qty: 0 | Refills: 0 | Status: DISCONTINUED | OUTPATIENT
Start: 2018-02-01 | End: 2018-02-05

## 2018-02-01 RX ORDER — ASCORBIC ACID 60 MG
500 TABLET,CHEWABLE ORAL
Qty: 0 | Refills: 0 | Status: DISCONTINUED | OUTPATIENT
Start: 2018-02-01 | End: 2018-02-23

## 2018-02-01 RX ORDER — SPIRONOLACTONE 25 MG/1
25 TABLET, FILM COATED ORAL DAILY
Qty: 0 | Refills: 0 | Status: DISCONTINUED | OUTPATIENT
Start: 2018-02-01 | End: 2018-02-08

## 2018-02-01 RX ORDER — FERROUS SULFATE 325(65) MG
325 TABLET ORAL DAILY
Qty: 0 | Refills: 0 | Status: DISCONTINUED | OUTPATIENT
Start: 2018-02-01 | End: 2018-02-16

## 2018-02-01 RX ORDER — SPIRONOLACTONE 25 MG/1
12.5 TABLET, FILM COATED ORAL DAILY
Qty: 0 | Refills: 0 | Status: DISCONTINUED | OUTPATIENT
Start: 2018-02-01 | End: 2018-02-01

## 2018-02-01 RX ORDER — DOCUSATE SODIUM 100 MG
100 CAPSULE ORAL
Qty: 0 | Refills: 0 | Status: DISCONTINUED | OUTPATIENT
Start: 2018-02-01 | End: 2018-02-02

## 2018-02-01 RX ORDER — HEPARIN SODIUM 5000 [USP'U]/ML
1300 INJECTION INTRAVENOUS; SUBCUTANEOUS
Qty: 25000 | Refills: 0 | Status: DISCONTINUED | OUTPATIENT
Start: 2018-02-01 | End: 2018-02-03

## 2018-02-01 RX ORDER — WARFARIN SODIUM 2.5 MG/1
3 TABLET ORAL ONCE
Qty: 0 | Refills: 0 | Status: COMPLETED | OUTPATIENT
Start: 2018-02-01 | End: 2018-02-01

## 2018-02-01 RX ORDER — MEXILETINE HYDROCHLORIDE 150 MG/1
150 CAPSULE ORAL
Qty: 0 | Refills: 0 | Status: DISCONTINUED | OUTPATIENT
Start: 2018-02-01 | End: 2018-02-23

## 2018-02-01 RX ORDER — COLCHICINE 0.6 MG
0.6 TABLET ORAL ONCE
Qty: 0 | Refills: 0 | Status: COMPLETED | OUTPATIENT
Start: 2018-02-01 | End: 2018-02-01

## 2018-02-01 RX ORDER — FUROSEMIDE 40 MG
20 TABLET ORAL EVERY OTHER DAY
Qty: 0 | Refills: 0 | Status: DISCONTINUED | OUTPATIENT
Start: 2018-02-01 | End: 2018-02-07

## 2018-02-01 RX ORDER — PANTOPRAZOLE SODIUM 20 MG/1
40 TABLET, DELAYED RELEASE ORAL
Qty: 0 | Refills: 0 | Status: DISCONTINUED | OUTPATIENT
Start: 2018-02-01 | End: 2018-02-09

## 2018-02-01 RX ORDER — HYDROCORTISONE 1 %
1 OINTMENT (GRAM) TOPICAL
Qty: 0 | Refills: 0 | Status: DISCONTINUED | OUTPATIENT
Start: 2018-02-01 | End: 2018-02-23

## 2018-02-01 RX ORDER — CARVEDILOL PHOSPHATE 80 MG/1
25 CAPSULE, EXTENDED RELEASE ORAL EVERY 12 HOURS
Qty: 0 | Refills: 0 | Status: DISCONTINUED | OUTPATIENT
Start: 2018-02-01 | End: 2018-02-09

## 2018-02-01 RX ADMIN — QUETIAPINE FUMARATE 50 MILLIGRAM(S): 200 TABLET, FILM COATED ORAL at 22:33

## 2018-02-01 RX ADMIN — WARFARIN SODIUM 3 MILLIGRAM(S): 2.5 TABLET ORAL at 22:32

## 2018-02-01 RX ADMIN — Medication 100 MILLIGRAM(S): at 22:34

## 2018-02-01 RX ADMIN — Medication 1 APPLICATION(S): at 22:34

## 2018-02-01 RX ADMIN — Medication 0.6 MILLIGRAM(S): at 22:33

## 2018-02-01 RX ADMIN — HEPARIN SODIUM 13 UNIT(S)/HR: 5000 INJECTION INTRAVENOUS; SUBCUTANEOUS at 20:18

## 2018-02-01 NOTE — H&P ADULT - HISTORY OF PRESENT ILLNESS
67M PMH Chronic Systolic Heart Failure (ACC/AHA Stage D) due to NICMP s/p LVAD 6/12/17, GIB s/p Cautery (7/25/2017), known AV Malformations, Chronic Anemia due to numerous GIBs (off AC), A-Fib, VT s/p AICD. Pt admitted due to elevated LDH level of 646. Pt denies any chest pain, SOB, palpitations, N/D/V, abdominal pain, headaches, changes in vision, dizziness, pain or burning while urinating, swelling, numbness/tingling anywhere, rashes, fever, or recent travel. 67M PMH Chronic Systolic Heart Failure (ACC/AHA Stage D) due to NICMP s/p LVAD 6/12/17, GIB s/p Cautery (7/25/2017), known AV Malformations, Chronic Anemia due to numerous GIBs (off AC), A-Fib, VT s/p AICD. Pt admitted due to elevated LDH level of 646 for further evaluation and observation.  . Pt denies any chest pain, SOB, palpitations, N/D/V, abdominal pain, headaches, changes in vision, dizziness, pain or burning while urinating, swelling, numbness/tingling anywhere, rashes, fever, or recent travel.

## 2018-02-01 NOTE — H&P ADULT - ASSESSMENT
67M PMH Chronic Systolic Heart Failure (ACC/AHA Stage D) due to NICMP s/p LVAD 6/12/17, GIB s/p Cautery (7/25/2017), known AV Malformations, Chronic Anemia due to numerous GIBs (off AC), A-Fib, VT s/p AICD. Pt admitted due to elevated LDH level of 646 (previously 466) for further evaluation and observation.

## 2018-02-01 NOTE — H&P ADULT - NSHPPHYSICALEXAM_GEN_ALL_CORE
Subjective: Pt states he is feeling well. Does not have any complaints at this time. (+) BM yesterday  Neurology: alert and oriented x 3, nonfocal, no gross deficits  CV : (+) Hum from LVAD  Sternal Wound :  CDI , Stable  Lungs: CTA B/L  Abdomen: soft, NT,ND, (+) Bowel sounds throughout. Driveline site C/D/I  :  voiding  Extremities: (-)c/c/e of LE b/l. No calve tenderness b/l Subjective: Pt states he is feeling well. Does not have any complaints at this time. (+) BM yesterday  Neurology: alert and oriented x 3, nonfocal, no gross deficits  CV : (+) Hum from LVAD  Lungs: CTA B/L  Abdomen: soft, NT,ND, (+) Bowel sounds throughout. Driveline site C/D/I  :  voiding  Extremities: (-)c/c/e of LE b/l. No calve tenderness b/l

## 2018-02-01 NOTE — H&P ADULT - PSH
AICD (Automatic Cardioverter/Defibrillator) Present  [employer] with 1 [employer] lead4/1/09- explanted and replaced with [employer] 2 leads on 9/2/09  History of Prior Ablation Treatment  for afib  LVAD (left ventricular assist device) present    Status post left hip replacement

## 2018-02-02 LAB
ABO + RH PNL BLD: NORMAL
ALBUMIN SERPL ELPH-MCNC: 4.2 G/DL
ALP BLD-CCNC: 98 U/L
ALT SERPL-CCNC: 24 U/L
ANION GAP SERPL CALC-SCNC: 11 MMOL/L — SIGNIFICANT CHANGE UP (ref 5–17)
ANION GAP SERPL CALC-SCNC: 15 MMOL/L
APPEARANCE: CLEAR
APTT BLD: 120.3 SEC — CRITICAL HIGH (ref 27.5–37.4)
APTT BLD: 122.4 SEC — CRITICAL HIGH (ref 27.5–37.4)
APTT BLD: 54.6 SEC — HIGH (ref 27.5–37.4)
AST SERPL-CCNC: 49 U/L
BASOPHILS # BLD AUTO: 0 K/UL
BASOPHILS NFR BLD AUTO: 0.5 %
BILIRUB SERPL-MCNC: 0.5 MG/DL
BILIRUBIN URINE: NEGATIVE
BLOOD URINE: NEGATIVE
BUN SERPL-MCNC: 23 MG/DL — SIGNIFICANT CHANGE UP (ref 7–23)
BUN SERPL-MCNC: 27 MG/DL
CALCIUM SERPL-MCNC: 8.9 MG/DL — SIGNIFICANT CHANGE UP (ref 8.4–10.5)
CALCIUM SERPL-MCNC: 9.6 MG/DL
CHLORIDE SERPL-SCNC: 102 MMOL/L
CHLORIDE SERPL-SCNC: 105 MMOL/L — SIGNIFICANT CHANGE UP (ref 96–108)
CHOLEST SERPL-MCNC: 155 MG/DL
CHOLEST/HDLC SERPL: 3 RATIO
CMV IGG SERPL QL: >10 U/ML
CMV IGG SERPL-IMP: POSITIVE
CMV IGM SERPL QL: <8 AU/ML
CMV IGM SERPL QL: NEGATIVE
CO2 SERPL-SCNC: 21 MMOL/L
CO2 SERPL-SCNC: 22 MMOL/L — SIGNIFICANT CHANGE UP (ref 22–31)
COLOR: ABNORMAL
CREAT SERPL-MCNC: 1.31 MG/DL — HIGH (ref 0.5–1.3)
CREAT SERPL-MCNC: 1.47 MG/DL
CREAT SPEC-SCNC: 219 MG/DL
EBV EA AB SER IA-ACNC: 22 U/ML
EBV EA AB TITR SER IF: POSITIVE
EBV EA IGG SER QL IA: >600 U/ML
EBV EA IGG SER-ACNC: POSITIVE
EBV EA IGM SER IA-ACNC: NEGATIVE
EBV PATRN SPEC IB-IMP: NORMAL
EBV VCA IGG SER IA-ACNC: 470 U/ML
EBV VCA IGM SER QL IA: <10 U/ML
EOSINOPHIL # BLD AUTO: 0.4 K/UL
EOSINOPHIL NFR BLD AUTO: 5.2 %
EPSTEIN-BARR VIRUS CAPSID ANTIGEN IGG: POSITIVE
ERYTHROCYTE [SEDIMENTATION RATE] IN BLOOD BY WESTERGREN METHOD: 46 MM/HR
FERRITIN SERPL-MCNC: 133 NG/ML
GLUCOSE QUALITATIVE U: NEGATIVE MG/DL
GLUCOSE SERPL-MCNC: 101 MG/DL — HIGH (ref 70–99)
GLUCOSE SERPL-MCNC: 80 MG/DL
HAV IGG+IGM SER QL: REACTIVE
HAV IGM SER QL: NONREACTIVE
HBA1C MFR BLD HPLC: 4.4 %
HBV SURFACE AB SER QL: NONREACTIVE
HBV SURFACE AB SERPL IA-ACNC: <3 MIU/ML
HBV SURFACE AG SER QL: NONREACTIVE
HCT VFR BLD CALC: 33.1 % — LOW (ref 39–50)
HCT VFR BLD CALC: 37.6 %
HCV AB SER QL: NONREACTIVE
HCV S/CO RATIO: 0.14 S/CO
HDLC SERPL-MCNC: 51 MG/DL
HGB BLD-MCNC: 11.2 G/DL — LOW (ref 13–17)
HGB BLD-MCNC: 12.5 G/DL
HIV 1+2 AB+HIV1 P24 AG SERPL QL IA: SIGNIFICANT CHANGE UP
HIV1+2 AB SPEC QL IA.RAPID: ABNORMAL
HIVABINT: NORMAL
HSV 1+2 IGG SER IA-IMP: POSITIVE
HSV1 IGG SER QL: 27.6 INDEX
INR BLD: 1.35 RATIO — HIGH (ref 0.88–1.16)
INR PPP: 1.31 RATIO
IRON SATN MFR SERPL: 35 %
IRON SERPL-MCNC: 95 UG/DL
KETONES URINE: NEGATIVE
LDH SERPL L TO P-CCNC: 534 U/L — HIGH (ref 50–242)
LDH SERPL-CCNC: 648 U/L
LDLC SERPL CALC-MCNC: 75 MG/DL
LEUKOCYTE ESTERASE URINE: NEGATIVE
LYMPHOCYTES # BLD AUTO: 1.7 K/UL
LYMPHOCYTES NFR BLD AUTO: 22.1 %
MAGNESIUM SERPL-MCNC: 2.3 MG/DL
MAN DIFF?: NO
MCHC RBC-ENTMCNC: 32.9 PG
MCHC RBC-ENTMCNC: 33 PG — SIGNIFICANT CHANGE UP (ref 27–34)
MCHC RBC-ENTMCNC: 33.3 GM/DL
MCHC RBC-ENTMCNC: 33.9 GM/DL — SIGNIFICANT CHANGE UP (ref 32–36)
MCV RBC AUTO: 97.1 FL — SIGNIFICANT CHANGE UP (ref 80–100)
MCV RBC AUTO: 98.6 FL
MEV IGG FLD QL IA: >300 AU/ML
MEV IGG+IGM SER-IMP: POSITIVE
MONOCYTES # BLD AUTO: 1 K/UL
MONOCYTES NFR BLD AUTO: 13.1 %
MUV AB SER-ACNC: POSITIVE
MUV IGG SER QL IA: 289 AU/ML
NEUTROPHILS # BLD AUTO: 4.5 K/UL
NEUTROPHILS NFR BLD AUTO: 59.1 %
NITRITE URINE: NEGATIVE
PH URINE: 5
PHOSPHATE SERPL-MCNC: 4.7 MG/DL
PLATELET # BLD AUTO: 189 K/UL — SIGNIFICANT CHANGE UP (ref 150–400)
PLATELET # BLD AUTO: 225 K/UL
POTASSIUM SERPL-MCNC: 4.1 MMOL/L — SIGNIFICANT CHANGE UP (ref 3.5–5.3)
POTASSIUM SERPL-SCNC: 4.1 MMOL/L — SIGNIFICANT CHANGE UP (ref 3.5–5.3)
POTASSIUM SERPL-SCNC: 4.8 MMOL/L
PREALB SERPL NEPH-MCNC: 23 MG/DL
PROT SERPL-MCNC: 8.2 G/DL
PROT UR-MCNC: 19 MG/DL
PROTEIN URINE: NEGATIVE MG/DL
PROTHROM AB SERPL-ACNC: 14.7 SEC — HIGH (ref 9.8–12.7)
PSA SERPL-MCNC: 1.54 NG/ML
PT BLD: 14.2 SEC
RBC # BLD: 3.41 M/UL — LOW (ref 4.2–5.8)
RBC # BLD: 3.81 M/UL
RBC # FLD: 17.2 % — HIGH (ref 10.3–14.5)
RBC # FLD: 17.3 %
RHEUMATOID FACT SER QL: <7 IU/ML
RUBV IGG FLD-ACNC: 19.2 INDEX
RUBV IGG SER-IMP: POSITIVE
SODIUM SERPL-SCNC: 138 MMOL/L
SODIUM SERPL-SCNC: 138 MMOL/L — SIGNIFICANT CHANGE UP (ref 135–145)
SPECIFIC GRAVITY URINE: 1.02
T GONDII AB SER-IMP: NEGATIVE
T GONDII AB SER-IMP: POSITIVE
T GONDII IGG SER QL: 22.8 IU/ML
T GONDII IGM SER QL: <3 AU/ML
T PALLIDUM AB SER QL IA: NEGATIVE
TIBC SERPL-MCNC: 274 UG/DL
TRIGL SERPL-MCNC: 147 MG/DL
TSH SERPL-ACNC: 1.58 UIU/ML
UIBC SERPL-MCNC: 179 UG/DL
URATE SERPL-MCNC: 4.1 MG/DL
UROBILINOGEN URINE: NEGATIVE MG/DL
VZV AB TITR SER: POSITIVE
VZV IGG SER IF-ACNC: 2949 INDEX
WBC # BLD: 8.2 K/UL — SIGNIFICANT CHANGE UP (ref 3.8–10.5)
WBC # FLD AUTO: 7.6 K/UL
WBC # FLD AUTO: 8.2 K/UL — SIGNIFICANT CHANGE UP (ref 3.8–10.5)

## 2018-02-02 PROCEDURE — 71045 X-RAY EXAM CHEST 1 VIEW: CPT | Mod: 26

## 2018-02-02 PROCEDURE — 99222 1ST HOSP IP/OBS MODERATE 55: CPT

## 2018-02-02 RX ORDER — DOCUSATE SODIUM 100 MG
100 CAPSULE ORAL THREE TIMES A DAY
Qty: 0 | Refills: 0 | Status: DISCONTINUED | OUTPATIENT
Start: 2018-02-02 | End: 2018-02-23

## 2018-02-02 RX ORDER — WARFARIN SODIUM 2.5 MG/1
3 TABLET ORAL ONCE
Qty: 0 | Refills: 0 | Status: COMPLETED | OUTPATIENT
Start: 2018-02-02 | End: 2018-02-02

## 2018-02-02 RX ADMIN — PANTOPRAZOLE SODIUM 40 MILLIGRAM(S): 20 TABLET, DELAYED RELEASE ORAL at 06:24

## 2018-02-02 RX ADMIN — QUETIAPINE FUMARATE 50 MILLIGRAM(S): 200 TABLET, FILM COATED ORAL at 22:40

## 2018-02-02 RX ADMIN — HEPARIN SODIUM 11 UNIT(S)/HR: 5000 INJECTION INTRAVENOUS; SUBCUTANEOUS at 17:52

## 2018-02-02 RX ADMIN — Medication 100 MILLIGRAM(S): at 06:25

## 2018-02-02 RX ADMIN — WARFARIN SODIUM 3 MILLIGRAM(S): 2.5 TABLET ORAL at 22:40

## 2018-02-02 RX ADMIN — Medication 325 MILLIGRAM(S): at 12:11

## 2018-02-02 RX ADMIN — HEPARIN SODIUM 13 UNIT(S)/HR: 5000 INJECTION INTRAVENOUS; SUBCUTANEOUS at 03:27

## 2018-02-02 RX ADMIN — Medication 500 MILLIGRAM(S): at 17:08

## 2018-02-02 RX ADMIN — Medication 1 APPLICATION(S): at 11:31

## 2018-02-02 RX ADMIN — Medication 20 MILLIGRAM(S): at 12:11

## 2018-02-02 RX ADMIN — Medication 1 APPLICATION(S): at 17:08

## 2018-02-02 RX ADMIN — AMIODARONE HYDROCHLORIDE 200 MILLIGRAM(S): 400 TABLET ORAL at 06:24

## 2018-02-02 RX ADMIN — Medication 100 MILLIGRAM(S): at 22:40

## 2018-02-02 RX ADMIN — MEXILETINE HYDROCHLORIDE 150 MILLIGRAM(S): 150 CAPSULE ORAL at 17:09

## 2018-02-02 RX ADMIN — HEPARIN SODIUM 12 UNIT(S)/HR: 5000 INJECTION INTRAVENOUS; SUBCUTANEOUS at 12:12

## 2018-02-02 RX ADMIN — SPIRONOLACTONE 25 MILLIGRAM(S): 25 TABLET, FILM COATED ORAL at 06:25

## 2018-02-02 RX ADMIN — Medication 500 MILLIGRAM(S): at 06:24

## 2018-02-02 RX ADMIN — CARVEDILOL PHOSPHATE 25 MILLIGRAM(S): 80 CAPSULE, EXTENDED RELEASE ORAL at 17:08

## 2018-02-02 RX ADMIN — MEXILETINE HYDROCHLORIDE 150 MILLIGRAM(S): 150 CAPSULE ORAL at 06:24

## 2018-02-02 RX ADMIN — CARVEDILOL PHOSPHATE 25 MILLIGRAM(S): 80 CAPSULE, EXTENDED RELEASE ORAL at 06:25

## 2018-02-02 NOTE — DIETITIAN INITIAL EVALUATION ADULT. - ADHERENCE
Pt endorses limited compliance to therapeutic diet guidelines. Breakfast is at a senior center and can be cereal with whole milk, hillman, or ham. Lunch can be a hamburger with french fries and dinner varies. Pt states he likes to also eat ox tail, chicken, and fish. Also admits to eating diego greens and has chinese food once per week. Pt receptive to review of therapeutic diet and states he has written materials at home. Pt states he is willing to make identified changes to his diet and he understands relationship between diet and health/disease./poor

## 2018-02-02 NOTE — PROGRESS NOTE ADULT - SUBJECTIVE AND OBJECTIVE BOX
VITAL SIGNS    Telemetry:  rsr 1st  with 7 bts WCT overnight   Vital Signs Last 24 Hrs  T(C): 36.5 (18 @ 10:00), Max: 36.8 (18 @ 23:30)  T(F): 97.7 (18 @ 10:00), Max: 98.2 (18 @ 23:30)  HR: 67 (18 @ 10:00) (64 - 89)  BP: 105/73 (18 @ 10:00) (104/84 - 118/87)  RR: 18 (18 @ 10:00) (18 - 18)  SpO2: 97% (18 @ 10:00) (95% - 97%)             @ 07:01  -   @ 07:00  --------------------------------------------------------  IN: 383 mL / OUT: 0 mL / NET: 383 mL     @ 07:01  -   @ 12:35  --------------------------------------------------------  IN: 320 mL / OUT: 0 mL / NET: 320 mL       Daily Height in cm: 172.72 (2018 18:52)    Daily Weight in k.8 (2018 10:52)  Admit Wt: Drug Dosing Weight  Height (cm): 172.72 (2018 18:52)  Weight (kg): 78.9 (2018 18:52)  BMI (kg/m2): 26.4 (2018 18:52)  BSA (m2): 1.93 (2018 18:52)    Bilirubin Total, Serum: 0.5 mg/dL ( @ 19:58)    CAPILLARY BLOOD GLUCOSE              acetaminophen   Tablet 650 milliGRAM(s) Oral every 6 hours PRN  amiodarone    Tablet 200 milliGRAM(s) Oral daily  ascorbic acid 500 milliGRAM(s) Oral two times a day  carvedilol 25 milliGRAM(s) Oral every 12 hours  docusate sodium 100 milliGRAM(s) Oral two times a day  ferrous    sulfate 325 milliGRAM(s) Oral daily  furosemide    Tablet 20 milliGRAM(s) Oral every other day  heparin  Infusion 1300 Unit(s)/Hr IV Continuous <Continuous>  hydrocortisone 2.5% Cream 1 Application(s) Topical two times a day  mexiletine 150 milliGRAM(s) Oral two times a day  pantoprazole    Tablet 40 milliGRAM(s) Oral before breakfast  QUEtiapine 50 milliGRAM(s) Oral at bedtime  spironolactone 25 milliGRAM(s) Oral daily  warfarin 3 milliGRAM(s) Oral once      PHYSICAL EXAM    Subjective: "I'm doing well."   Neurology: alert and oriented x 3, nonfocal, no gross deficits  CV : tele:  rsr 1st 80-90 with 7 bts WCT overnight   Sternal Wound :  CDI NAZ- healing well.   Lungs: clear. RR easy, unlabored   Abdomen: soft, nontender, nondistended, positive bowel sounds, + bowel movement   Neg N/V/D   drive line cdi with dressing.  :  pt voiding without difficulty   Extremities:   RUVALCABA; neg LE edema, neg calf tenderness.   PPP bilaterally

## 2018-02-02 NOTE — CONSULT NOTE ADULT - ASSESSMENT
66 yo man with a history of a nonischemic cardiomyopathy admitted with anemia, GI bleeding and being further evaluated for a possible transplant listing    Follow up on HIV ab and HIV viral load PCR test  Please note that he is toxo ab. + and will need Bactrim prophylaxis  He will need vaccination for pneumococcal pneumonia  He may need a second seasonal influenza vaccine  Needs HBV vaccine series  HAV vaccine series  Would repeat quantiferon gold    Will follow with you    Gary Lujan MD  [phone]  After 5pm/weekends [phone]

## 2018-02-02 NOTE — DIETITIAN INITIAL EVALUATION ADULT. - NS AS NUTRI INTERV ED CONTENT
Recommended modifications/Reviewed/reinforced medical nutrition therapy for heart health, CHF, and Coumadin/Vitamin K interaction. Provided verbal and written instruction on food safety for transplant recipients. Pt states he is willing to make changes to make identified changes to current diet to improve adherence to therapeutic diet. Discussed relationship between diet/food safety and health/disease./Purpose of the nutrition education/Priority modifications

## 2018-02-02 NOTE — DIETITIAN INITIAL EVALUATION ADULT. - OTHER INFO
Pt seen yesterday at heart transplant clinic for nutrition evaluation. No known food allergies or intolerances reported. Supplementation PTA consisted of vitamin C, folic acid, and ferrous sulfate per H&P. Pt currently with a good appetite and eating 100% of hospital meals. Denies GI distress with last BM passed 2 days ago. Reports no difficulty chewing/swallowing.

## 2018-02-02 NOTE — PROGRESS NOTE ADULT - ASSESSMENT
67M PMH Chronic Systolic Heart Failure (ACC/AHA Stage D) due to NICMP s/p LVAD 6/12/17, GIB s/p Cautery (7/25/2017), known AV Malformations, Chronic Anemia due to numerous GIBs (off AC), A-Fib, VT s/p AICD. Pt admitted due to elevated LDH level of 646 (previously 466) for further evaluation and observation.   2/2 VSS ; INR 1.3- continue heparin and coumadin   meds as per CHF team  PT eval

## 2018-02-02 NOTE — CONSULT NOTE ADULT - SUBJECTIVE AND OBJECTIVE BOX
ID CONSULTATION-- Gary Lujan [phone]    Patient is a 67y old  Male who presents with a chief complaint of Elevated LDH levels (01 Feb 2018 19:36)    HPI:  67M PMH Chronic Systolic Heart Failure (ACC/AHA Stage D) due to NICMP s/p LVAD 6/12/17, GIB s/p Cautery (7/25/2017), known AV Malformations, Chronic Anemia due to numerous GIBs (off AC), A-Fib, VT s/p AICD. Pt admitted due to elevated LDH level of 646 for further evaluation and observation.  . Pt denies any chest pain, SOB, palpitations, N/D/V, abdominal pain, headaches, changes in vision, dizziness, pain or burning while urinating, swelling, numbness/tingling anywhere, rashes, fever, or recent travel. (01 Feb 2018 19:36)      PAST MEDICAL & SURGICAL HISTORY:  GIB (gastrointestinal bleeding)  Ventricular fibrillation: s/p AICD  PAF (paroxysmal atrial fibrillation): on xarelto  Non-Ischemic Cardiomyopathy  SVT (Supraventricular Tachycardia)  HTN  CHF (Congestive Heart Failure)  LVAD (left ventricular assist device) present  Status post left hip replacement  History of Prior Ablation Treatment: for afib  AICD (Automatic Cardioverter/Defibrillator) Present: [employer] with 1 [employer] lead4/1/09- explanted and replaced with [employer] 2 leads on 9/2/09  also a history of gout      SOCIAL:  lives with his sister  Born in [place]  never lived outside of the   no pets  ETOH hx.    FAMILY HISTORY:  No pertinent family history in first degree relatives    REVIEW OF SYSTEMS  General:	Denies any malaise fatigue or chills. Fevers absent    Skin:No rash  	  Ophthalmologic:Denies any visual complaints,discharge redness or photophobia  	  ENMT:No nasal discharge,headache,sinus congestion or throat pain.No dental complaints    Respiratory and Thorax:No cough,sputum or chest pain.Denies shortness of breath  	  Cardiovascular:	No chest pain,palpitaions or dizziness    Gastrointestinal:	NO nausea,abdominal pain or diarrhea.    Genitourinary:	No dysuria,frequency. No flank pain    Musculoskeletal:	+ knee joint swelling nor pain.No weakness, s/p left hip THR    Neurological:No confusion,diziness.No extremity weakness.No bladder or bowel incontinence	    Psychiatric:No delusions or hallucinations	    Hematology/Lymphatics:	No LN swelling.No gum bleeding     Endocrine:	No recent weight gain or loss.No abnormal heat/cold intolerance    Allergic/Immunologic:	No hives or rash   Allergies    No Known Allergies    Intolerances        ANTIMICROBIALS:        Vital Signs Last 24 Hrs  T(C): 36 (02 Feb 2018 16:43), Max: 36.8 (01 Feb 2018 23:30)  T(F): 96.8 (02 Feb 2018 16:43), Max: 98.2 (01 Feb 2018 23:30)  HR: 71 (02 Feb 2018 16:43) (64 - 89)  BP: 114/82 (02 Feb 2018 16:43) (104/84 - 118/87)  BP(mean): 92 (02 Feb 2018 16:43) (83 - 92)  RR: 18 (02 Feb 2018 16:43) (18 - 18)  SpO2: 97% (02 Feb 2018 16:43) (95% - 97%)    PHYSICAL EXAM:Pleasant patient in no acute distress.  sitting in a chair      Constitutional:Comfortable.Awake and alert  No cachexia     Eyes:PERRL EOMI.NO discharge or conjunctival injection    ENMT:No sinus tenderness.No thrush.No pharyngeal exudate or erythema.Fair dental hygiene, partial dentures    Neck:Supple,No LN,no JVD, no adenopathy      Respiratory:Good air entry bilaterally,CTA  sternotomy site healed    Cardiovascular: LVAD sounds  LVAD exit site wihtout erythema dressing dry and intact    Gastrointestinal:Soft BS(+) no tenderness no masses ,No rebound or guarding    Genitourinary:No CVA tendereness     Rectal:    Extremities:No cyanosis,clubbing or edema.    Vascular:peripheral pulses felt    Neurological:AAO X 3,No grossly focal deficits    Skin:No rash     Lymph Nodes:No palpable LNs    Musculoskeletal: right knee larger than left with old arthritis, hands with proximal joint swelling but no erythema or warmth    Psychiatric:Affect normal. mentation a bit slow                              11.2   8.2   )-----------( 189      ( 02 Feb 2018 02:36 )             33.1       02-02    138  |  105  |  23  ----------------------------<  101<H>  4.1   |  22  |  1.31<H>    Ca    8.9      02 Feb 2018 02:36    TPro  8.3  /  Alb  3.9  /  TBili  0.5  /  DBili  x   /  AST  85<H>  /  ALT  29  /  AlkPhos  86  02-01      RECENT CULTURES:      RADIOLOGY:    < from: Xray Chest 1 View AP/PA (02.02.18 @ 05:48) >    IMPRESSION:    Probable mild left basilar atelectasis. The lungs are otherwise clear.    < end of copied text    Rx:  Serologies:  HBV -  HCV -  HAV-  RPR -  CMV IgG+  EBV IgG+  Quant gold - 5/17  Rubella, Rubeola, mumps IgG+  HSV-1 IgG+  toxo IgG+  HIV ab screen+ - to be repeated with an HIV VL    reports having had influenza seasonal vaccine at [place]

## 2018-02-02 NOTE — DIETITIAN INITIAL EVALUATION ADULT. - ENERGY NEEDS
Ht:5ft8in, Wt:173.9pounds, BMI:26.5,   IBW:154pounds (+/-10%), 113%IBW  Pertinent Information: Pt admitted due to elevated LDH for further evaluation and observation as per chart. No pressure ulcers or edema noted at this time.

## 2018-02-02 NOTE — DIETITIAN INITIAL EVALUATION ADULT. - PERTINENT LABORATORY DATA
(2/2)Hgb/Hct:11.2/33.1-low, Na:138, K:4.1, Cl:105, BUN:23, Cr:1.31-high, Glucose:101, Ca:8.9, LDH:534-high, (2/1)Total Protein:8.3, Albumin:3.9, Total bilirubin::0.5, AlkPhos:86, AST:85-high, ALT:29.

## 2018-02-02 NOTE — CONSULT NOTE ADULT - PROBLEM SELECTOR RECOMMENDATION 9
- serial CBC, transfuse to hgb goal > 8  - regular diet today  - NPO after midnight for capsule endoscopy tomorrow  - can continue emperic PPI

## 2018-02-02 NOTE — PROGRESS NOTE ADULT - PROBLEM SELECTOR PLAN 5
Continue hydralazine, amiodarone, lasix, aldactone, mexilitine, coreg as per CHF team   today   Pt eval today as per Dr. Parker

## 2018-02-03 LAB
ANION GAP SERPL CALC-SCNC: 9 MMOL/L — SIGNIFICANT CHANGE UP (ref 5–17)
APTT BLD: 70.7 SEC — HIGH (ref 27.5–37.4)
APTT BLD: 81 SEC — HIGH (ref 27.5–37.4)
APTT BLD: 81.3 SEC — HIGH (ref 27.5–37.4)
BUN SERPL-MCNC: 20 MG/DL — SIGNIFICANT CHANGE UP (ref 7–23)
CALCIUM SERPL-MCNC: 9.3 MG/DL — SIGNIFICANT CHANGE UP (ref 8.4–10.5)
CHLORIDE SERPL-SCNC: 104 MMOL/L — SIGNIFICANT CHANGE UP (ref 96–108)
CO2 SERPL-SCNC: 25 MMOL/L — SIGNIFICANT CHANGE UP (ref 22–31)
CREAT SERPL-MCNC: 1.36 MG/DL — HIGH (ref 0.5–1.3)
GLUCOSE SERPL-MCNC: 105 MG/DL — HIGH (ref 70–99)
HCT VFR BLD CALC: 33.9 % — LOW (ref 39–50)
HGB BLD-MCNC: 11.1 G/DL — LOW (ref 13–17)
HIV-1 VIRAL LOAD RESULT: SIGNIFICANT CHANGE UP
HIV1 RNA # SERPL NAA+PROBE: SIGNIFICANT CHANGE UP
HIV1 RNA SER-IMP: SIGNIFICANT CHANGE UP
HIV1 RNA SERPL NAA+PROBE-ACNC: SIGNIFICANT CHANGE UP
HIV1 RNA SERPL NAA+PROBE-LOG#: SIGNIFICANT CHANGE UP LG COP/ML
INR BLD: 1.53 RATIO — HIGH (ref 0.88–1.16)
LDH SERPL L TO P-CCNC: 566 U/L — HIGH (ref 50–242)
LDH SERPL L TO P-CCNC: 655 U/L — HIGH (ref 50–242)
MCHC RBC-ENTMCNC: 32 PG — SIGNIFICANT CHANGE UP (ref 27–34)
MCHC RBC-ENTMCNC: 32.7 GM/DL — SIGNIFICANT CHANGE UP (ref 32–36)
MCV RBC AUTO: 97.9 FL — SIGNIFICANT CHANGE UP (ref 80–100)
PLATELET # BLD AUTO: 209 K/UL — SIGNIFICANT CHANGE UP (ref 150–400)
POTASSIUM SERPL-MCNC: 4.5 MMOL/L — SIGNIFICANT CHANGE UP (ref 3.5–5.3)
POTASSIUM SERPL-SCNC: 4.5 MMOL/L — SIGNIFICANT CHANGE UP (ref 3.5–5.3)
PROTHROM AB SERPL-ACNC: 16.8 SEC — HIGH (ref 9.8–12.7)
RBC # BLD: 3.47 M/UL — LOW (ref 4.2–5.8)
RBC # FLD: 17 % — HIGH (ref 10.3–14.5)
SODIUM SERPL-SCNC: 138 MMOL/L — SIGNIFICANT CHANGE UP (ref 135–145)
WBC # BLD: 8.9 K/UL — SIGNIFICANT CHANGE UP (ref 3.8–10.5)
WBC # FLD AUTO: 8.9 K/UL — SIGNIFICANT CHANGE UP (ref 3.8–10.5)

## 2018-02-03 PROCEDURE — 93750 INTERROGATION VAD IN PERSON: CPT

## 2018-02-03 PROCEDURE — 76775 US EXAM ABDO BACK WALL LIM: CPT | Mod: 26

## 2018-02-03 PROCEDURE — 99223 1ST HOSP IP/OBS HIGH 75: CPT | Mod: 25

## 2018-02-03 RX ORDER — HEPARIN SODIUM 5000 [USP'U]/ML
9 INJECTION INTRAVENOUS; SUBCUTANEOUS
Qty: 25000 | Refills: 0 | Status: DISCONTINUED | OUTPATIENT
Start: 2018-02-03 | End: 2018-02-06

## 2018-02-03 RX ORDER — HEPARIN SODIUM 5000 [USP'U]/ML
950 INJECTION INTRAVENOUS; SUBCUTANEOUS
Qty: 25000 | Refills: 0 | Status: DISCONTINUED | OUTPATIENT
Start: 2018-02-03 | End: 2018-02-03

## 2018-02-03 RX ORDER — WARFARIN SODIUM 2.5 MG/1
3 TABLET ORAL ONCE
Qty: 0 | Refills: 0 | Status: COMPLETED | OUTPATIENT
Start: 2018-02-03 | End: 2018-02-03

## 2018-02-03 RX ADMIN — HEPARIN SODIUM 9.5 UNIT(S)/HR: 5000 INJECTION INTRAVENOUS; SUBCUTANEOUS at 01:20

## 2018-02-03 RX ADMIN — QUETIAPINE FUMARATE 50 MILLIGRAM(S): 200 TABLET, FILM COATED ORAL at 22:03

## 2018-02-03 RX ADMIN — Medication 500 MILLIGRAM(S): at 17:33

## 2018-02-03 RX ADMIN — Medication 100 MILLIGRAM(S): at 22:02

## 2018-02-03 RX ADMIN — MEXILETINE HYDROCHLORIDE 150 MILLIGRAM(S): 150 CAPSULE ORAL at 17:32

## 2018-02-03 RX ADMIN — HEPARIN SODIUM 9 UNIT(S)/HR: 5000 INJECTION INTRAVENOUS; SUBCUTANEOUS at 17:32

## 2018-02-03 RX ADMIN — CARVEDILOL PHOSPHATE 25 MILLIGRAM(S): 80 CAPSULE, EXTENDED RELEASE ORAL at 06:12

## 2018-02-03 RX ADMIN — HEPARIN SODIUM 9 UNIT(S)/HR: 5000 INJECTION INTRAVENOUS; SUBCUTANEOUS at 10:15

## 2018-02-03 RX ADMIN — HEPARIN SODIUM 9.5 UNIT(S)/HR: 5000 INJECTION INTRAVENOUS; SUBCUTANEOUS at 08:24

## 2018-02-03 RX ADMIN — Medication 100 MILLIGRAM(S): at 06:14

## 2018-02-03 RX ADMIN — Medication 1 APPLICATION(S): at 11:33

## 2018-02-03 RX ADMIN — WARFARIN SODIUM 3 MILLIGRAM(S): 2.5 TABLET ORAL at 22:02

## 2018-02-03 RX ADMIN — Medication 1 APPLICATION(S): at 17:33

## 2018-02-03 RX ADMIN — CARVEDILOL PHOSPHATE 25 MILLIGRAM(S): 80 CAPSULE, EXTENDED RELEASE ORAL at 17:32

## 2018-02-03 RX ADMIN — MEXILETINE HYDROCHLORIDE 150 MILLIGRAM(S): 150 CAPSULE ORAL at 06:12

## 2018-02-03 RX ADMIN — Medication 500 MILLIGRAM(S): at 06:12

## 2018-02-03 RX ADMIN — PANTOPRAZOLE SODIUM 40 MILLIGRAM(S): 20 TABLET, DELAYED RELEASE ORAL at 06:12

## 2018-02-03 RX ADMIN — Medication 100 MILLIGRAM(S): at 14:19

## 2018-02-03 RX ADMIN — Medication 325 MILLIGRAM(S): at 11:33

## 2018-02-03 RX ADMIN — SPIRONOLACTONE 25 MILLIGRAM(S): 25 TABLET, FILM COATED ORAL at 06:12

## 2018-02-03 RX ADMIN — AMIODARONE HYDROCHLORIDE 200 MILLIGRAM(S): 400 TABLET ORAL at 06:12

## 2018-02-03 NOTE — PROGRESS NOTE ADULT - ASSESSMENT
67M PMH Chronic Systolic Heart Failure (ACC/AHA Stage D) due to NICMP s/p LVAD 6/12/17, GIB s/p Cautery (7/25/2017), known AV Malformations, Chronic Anemia due to numerous GIBs (off AC), A-Fib, VT s/p AICD. Pt admitted due to elevated LDH level of 646 (previously 466) for further evaluation and observation.   2/2 VSS ; INR 1.3- continue heparin and coumadin   meds as per CHF team  2/3 - monitor LDH twice a day; continue heparin and coumadin - INR 1.5- coumadin 3 mg today; continue meds as per CHF team   renal sono to evaluate left renal cyst   PT eval

## 2018-02-03 NOTE — CONSULT NOTE ADULT - SUBJECTIVE AND OBJECTIVE BOX
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 Due to inability to use lovenox given prior GIB he was admitted for heparin gtt/coumadin. He was last hospitalized 10/30- where he was transferred to [facility] for consideration of transplant listing but he lacked secondary insurance so he was unable to get listed. He was also evaluated at [facility] where they had concerns regarding his rehabilitation ability given right knee pain. Over the past several months he has done fairly well off anticoagulation without chest pain/SOB/orthopnea. Denies any LVAD alarms or urinary discoloration. He has gained weight (approx 20 pounds in time span).     PAST MEDICAL & SURGICAL HISTORY:  GIB (gastrointestinal bleeding)  Ventricular fibrillation: s/p AICD  PAF (paroxysmal atrial fibrillation): on xarelto  Non-Ischemic Cardiomyopathy  SVT (Supraventricular Tachycardia)  HTN  CHF (Congestive Heart Failure)  LVAD (left ventricular assist device) present  Status post left hip replacement  History of Prior Ablation Treatment: for afib  AICD (Automatic Cardioverter/Defibrillator) Present: [employer] with 1 [employer] lead09- explanted and replaced with Medtronic 2 leads on 09    FAMILY HISTORY:  No pertinent family history in first degree relatives    Medications:  acetaminophen   Tablet 650 milliGRAM(s) Oral every 6 hours PRN  amiodarone    Tablet 200 milliGRAM(s) Oral daily  ascorbic acid 500 milliGRAM(s) Oral two times a day  carvedilol 25 milliGRAM(s) Oral every 12 hours  docusate sodium 100 milliGRAM(s) Oral three times a day  ferrous    sulfate 325 milliGRAM(s) Oral daily  furosemide    Tablet 20 milliGRAM(s) Oral every other day  heparin  Infusion 9 Unit(s)/Hr IV Continuous <Continuous>  hydrocortisone 2.5% Cream 1 Application(s) Topical two times a day  mexiletine 150 milliGRAM(s) Oral two times a day  pantoprazole    Tablet 40 milliGRAM(s) Oral before breakfast  QUEtiapine 50 milliGRAM(s) Oral at bedtime  spironolactone 25 milliGRAM(s) Oral daily  warfarin 3 milliGRAM(s) Oral once      Vitals:  T(C): 36.7 (18 @ 19:28), Max: 36.7 (18 @ 02:18)  HR: 80 (18 @ 19:10) (74 - 80)  BP: 128/92 (18 @ 17:48) (104/75 - 128/92)  BP(mean): 93 (18 @ 19:28) (82 - 101)  RR: 19 (18 @ 19:28) (18 - 19)  SpO2: 97% (18 @ 19:28) (96% - 99%)    Daily     Daily Weight in k.5 (2018 09:06)        I&O's Summary    2018 07:  -  2018 07:00  --------------------------------------------------------  IN: 1283 mL / OUT: 2400 mL / NET: -1117 mL    2018 07:01  -  2018 20:27  --------------------------------------------------------  IN: 1098 mL / OUT: 1450 mL / NET: -352 mL    Physical Exam:  Appearance: No Acute Distress  HEENT: No JVD  Cardiovascular: LVAD hum  Respiratory: Clear to auscultation bilaterally  Gastrointestinal: Soft, Non-tender	  Skin: No cyanosis	  Neurologic: Non-focal  Extremities: No LE edema; R knee effusion  Psychiatry: A & O x 3, Mood & affect appropriate    LVAD Interrogation:  Pump Flow: 5.2  Pump Speed: 9200  Pulse Index: 7.6  Pump Power: 5.5  VAD Events: no events  Driveline evaluation:  c/d/i  Programming Changes: No changes made    Labs:                        11.1   8.9   )-----------( 209      ( 2018 06:24 )             33.9         138  |  104  |  20  ----------------------------<  105<H>  4.5   |  25  |  1.36<H>    Ca    9.3      2018 06:24      PT/INR - ( 2018 06:24 )   PT: 16.8 sec;   INR: 1.53 ratio         PTT - ( 2018 16:23 )  PTT:70.7 sec    Lactate Dehydrogenase, Serum: 655 U/L ( @ 16:23)  Lactate Dehydrogenase, Serum: 566 U/L ( @ 06:24)  Lactate Dehydrogenase, Serum: 534 U/L ( @ 02:36)      TELEMETRY:  VT 17 beats slow

## 2018-02-03 NOTE — PROGRESS NOTE ADULT - SUBJECTIVE AND OBJECTIVE BOX
VITAL SIGNS    Telemetry:  rsr 70's 13 bts wct   Vital Signs Last 24 Hrs  T(C): 36.4 (18 @ 09:30), Max: 36.7 (18 @ 02:18)  T(F): 97.5 (18 @ 09:30), Max: 98 (18 @ 02:18)  HR: 78 (18 @ 09:30) (71 - 78)  BP: 113/82 (18 @ 05:00) (104/75 - 124/89)  RR: 18 (18 @ 09:30) (18 - 18)  SpO2: 96% (18 @ 09:30) (96% - 99%)             @ 07:01  -   @ 07:00  --------------------------------------------------------  IN: 1283 mL / OUT: 2400 mL / NET: -1117 mL       Daily     Daily Weight in k.5 (2018 09:06)  Admit Wt: Drug Dosing Weight  Height (cm): 172.72 (2018 18:52)  Weight (kg): 78.9 (2018 18:52)  BMI (kg/m2): 26.4 (2018 18:52)  BSA (m2): 1.93 (2018 18:52)      CAPILLARY BLOOD GLUCOSE              acetaminophen   Tablet 650 milliGRAM(s) Oral every 6 hours PRN  amiodarone    Tablet 200 milliGRAM(s) Oral daily  ascorbic acid 500 milliGRAM(s) Oral two times a day  carvedilol 25 milliGRAM(s) Oral every 12 hours  docusate sodium 100 milliGRAM(s) Oral three times a day  ferrous    sulfate 325 milliGRAM(s) Oral daily  furosemide    Tablet 20 milliGRAM(s) Oral every other day  heparin  Infusion 9 Unit(s)/Hr IV Continuous <Continuous>  hydrocortisone 2.5% Cream 1 Application(s) Topical two times a day  mexiletine 150 milliGRAM(s) Oral two times a day  pantoprazole    Tablet 40 milliGRAM(s) Oral before breakfast  QUEtiapine 50 milliGRAM(s) Oral at bedtime  spironolactone 25 milliGRAM(s) Oral daily  warfarin 3 milliGRAM(s) Oral once      PHYSICAL EXAM    Subjective: "I feel good."    Neurology: alert and oriented x 3, nonfocal, no gross deficits  CV : tele: rsr 70's 13 bts wct   Sternal Wound :  CDI well- healed.   drive line cdi with dressing.   Lungs: clear. RR easy, unlabored   Abdomen: soft, nontender, nondistended, positive bowel sounds, + bowel movement   Neg N/V/D   :  pt voiding without difficulty   Extremities:   RUVALCABA; trace LE edema, neg calf tenderness.   PPP bilaterally      PW: none   Chest tubes: none

## 2018-02-04 LAB
ANION GAP SERPL CALC-SCNC: 9 MMOL/L — SIGNIFICANT CHANGE UP (ref 5–17)
APPEARANCE UR: CLEAR — SIGNIFICANT CHANGE UP
APTT BLD: 64.2 SEC — HIGH (ref 27.5–37.4)
APTT BLD: 65.3 SEC — HIGH (ref 27.5–37.4)
BILIRUB UR-MCNC: NEGATIVE — SIGNIFICANT CHANGE UP
BUN SERPL-MCNC: 21 MG/DL — SIGNIFICANT CHANGE UP (ref 7–23)
CALCIUM SERPL-MCNC: 9.4 MG/DL — SIGNIFICANT CHANGE UP (ref 8.4–10.5)
CHLORIDE SERPL-SCNC: 101 MMOL/L — SIGNIFICANT CHANGE UP (ref 96–108)
CO2 SERPL-SCNC: 28 MMOL/L — SIGNIFICANT CHANGE UP (ref 22–31)
COLOR SPEC: YELLOW — SIGNIFICANT CHANGE UP
CREAT SERPL-MCNC: 1.44 MG/DL — HIGH (ref 0.5–1.3)
DIFF PNL FLD: NEGATIVE — SIGNIFICANT CHANGE UP
GLUCOSE SERPL-MCNC: 99 MG/DL — SIGNIFICANT CHANGE UP (ref 70–99)
GLUCOSE UR QL: NEGATIVE — SIGNIFICANT CHANGE UP
HCT VFR BLD CALC: 35.4 % — LOW (ref 39–50)
HGB BLD-MCNC: 11.9 G/DL — LOW (ref 13–17)
INR BLD: 1.48 RATIO — HIGH (ref 0.88–1.16)
KETONES UR-MCNC: NEGATIVE — SIGNIFICANT CHANGE UP
LDH SERPL L TO P-CCNC: 695 U/L — HIGH (ref 50–242)
LEUKOCYTE ESTERASE UR-ACNC: NEGATIVE — SIGNIFICANT CHANGE UP
MCHC RBC-ENTMCNC: 32.7 PG — SIGNIFICANT CHANGE UP (ref 27–34)
MCHC RBC-ENTMCNC: 33.6 GM/DL — SIGNIFICANT CHANGE UP (ref 32–36)
MCV RBC AUTO: 97.4 FL — SIGNIFICANT CHANGE UP (ref 80–100)
NITRITE UR-MCNC: NEGATIVE — SIGNIFICANT CHANGE UP
PH UR: 5.5 — SIGNIFICANT CHANGE UP (ref 5–8)
PLATELET # BLD AUTO: 216 K/UL — SIGNIFICANT CHANGE UP (ref 150–400)
POTASSIUM SERPL-MCNC: 4.7 MMOL/L — SIGNIFICANT CHANGE UP (ref 3.5–5.3)
POTASSIUM SERPL-SCNC: 4.7 MMOL/L — SIGNIFICANT CHANGE UP (ref 3.5–5.3)
PROT UR-MCNC: NEGATIVE — SIGNIFICANT CHANGE UP
PROTHROM AB SERPL-ACNC: 16.1 SEC — HIGH (ref 9.8–12.7)
RBC # BLD: 3.63 M/UL — LOW (ref 4.2–5.8)
RBC # FLD: 17.1 % — HIGH (ref 10.3–14.5)
SODIUM SERPL-SCNC: 138 MMOL/L — SIGNIFICANT CHANGE UP (ref 135–145)
SP GR SPEC: 1.01 — SIGNIFICANT CHANGE UP (ref 1.01–1.02)
UROBILINOGEN FLD QL: NEGATIVE — SIGNIFICANT CHANGE UP
WBC # BLD: 9 K/UL — SIGNIFICANT CHANGE UP (ref 3.8–10.5)
WBC # FLD AUTO: 9 K/UL — SIGNIFICANT CHANGE UP (ref 3.8–10.5)

## 2018-02-04 PROCEDURE — 90847 FAMILY PSYTX W/PT 50 MIN: CPT

## 2018-02-04 PROCEDURE — 99232 SBSQ HOSP IP/OBS MODERATE 35: CPT

## 2018-02-04 PROCEDURE — 93306 TTE W/DOPPLER COMPLETE: CPT | Mod: 26

## 2018-02-04 RX ORDER — WARFARIN SODIUM 2.5 MG/1
5 TABLET ORAL ONCE
Qty: 0 | Refills: 0 | Status: COMPLETED | OUTPATIENT
Start: 2018-02-04 | End: 2018-02-04

## 2018-02-04 RX ADMIN — Medication 500 MILLIGRAM(S): at 17:17

## 2018-02-04 RX ADMIN — Medication 20 MILLIGRAM(S): at 12:02

## 2018-02-04 RX ADMIN — Medication 1 APPLICATION(S): at 17:18

## 2018-02-04 RX ADMIN — HEPARIN SODIUM 9 UNIT(S)/HR: 5000 INJECTION INTRAVENOUS; SUBCUTANEOUS at 00:58

## 2018-02-04 RX ADMIN — Medication 325 MILLIGRAM(S): at 12:02

## 2018-02-04 RX ADMIN — MEXILETINE HYDROCHLORIDE 150 MILLIGRAM(S): 150 CAPSULE ORAL at 06:25

## 2018-02-04 RX ADMIN — CARVEDILOL PHOSPHATE 25 MILLIGRAM(S): 80 CAPSULE, EXTENDED RELEASE ORAL at 17:17

## 2018-02-04 RX ADMIN — Medication 1 APPLICATION(S): at 06:25

## 2018-02-04 RX ADMIN — Medication 100 MILLIGRAM(S): at 21:09

## 2018-02-04 RX ADMIN — QUETIAPINE FUMARATE 50 MILLIGRAM(S): 200 TABLET, FILM COATED ORAL at 21:09

## 2018-02-04 RX ADMIN — AMIODARONE HYDROCHLORIDE 200 MILLIGRAM(S): 400 TABLET ORAL at 06:25

## 2018-02-04 RX ADMIN — SPIRONOLACTONE 25 MILLIGRAM(S): 25 TABLET, FILM COATED ORAL at 06:25

## 2018-02-04 RX ADMIN — Medication 100 MILLIGRAM(S): at 06:25

## 2018-02-04 RX ADMIN — PANTOPRAZOLE SODIUM 40 MILLIGRAM(S): 20 TABLET, DELAYED RELEASE ORAL at 06:25

## 2018-02-04 RX ADMIN — CARVEDILOL PHOSPHATE 25 MILLIGRAM(S): 80 CAPSULE, EXTENDED RELEASE ORAL at 06:25

## 2018-02-04 RX ADMIN — MEXILETINE HYDROCHLORIDE 150 MILLIGRAM(S): 150 CAPSULE ORAL at 17:17

## 2018-02-04 RX ADMIN — Medication 500 MILLIGRAM(S): at 06:25

## 2018-02-04 RX ADMIN — WARFARIN SODIUM 5 MILLIGRAM(S): 2.5 TABLET ORAL at 21:09

## 2018-02-04 NOTE — PROGRESS NOTE ADULT - SUBJECTIVE AND OBJECTIVE BOX
INFECTIOUS DISEASES FOLLOW UP-- Sujey Lujan  [phone]    This is a follow up note for this  67yMale with  Heart replaced by heart assist device admitted with elevated LDH, GI bleeding, ID evaluating for clearance to be listed for a heart transplant      ROS:  CONSTITUTIONAL:  No fever, good appetite  CARDIOVASCULAR:  No chest pain or palpitations  RESPIRATORY:  No dyspnea  GASTROINTESTINAL:  No nausea, vomiting, diarrhea, or abdominal pain  GENITOURINARY:  No dysuria  NEUROLOGIC:  No headache,     Allergies    No Known Allergies    Intolerances        ANTIBIOTICS/RELEVANT:  antimicrobials    immunologic:    OTHER:  acetaminophen   Tablet 650 milliGRAM(s) Oral every 6 hours PRN  amiodarone    Tablet 200 milliGRAM(s) Oral daily  ascorbic acid 500 milliGRAM(s) Oral two times a day  carvedilol 25 milliGRAM(s) Oral every 12 hours  docusate sodium 100 milliGRAM(s) Oral three times a day  ferrous    sulfate 325 milliGRAM(s) Oral daily  furosemide    Tablet 20 milliGRAM(s) Oral every other day  heparin  Infusion 9 Unit(s)/Hr IV Continuous <Continuous>  hydrocortisone 2.5% Cream 1 Application(s) Topical two times a day  mexiletine 150 milliGRAM(s) Oral two times a day  pantoprazole    Tablet 40 milliGRAM(s) Oral before breakfast  QUEtiapine 50 milliGRAM(s) Oral at bedtime  spironolactone 25 milliGRAM(s) Oral daily  warfarin 5 milliGRAM(s) Oral once      Objective:  Vital Signs Last 24 Hrs  T(C): 36.6 (04 Feb 2018 10:00), Max: 36.8 (03 Feb 2018 21:56)  T(F): 97.9 (04 Feb 2018 10:00), Max: 98.2 (03 Feb 2018 21:56)  HR: 78 (04 Feb 2018 10:00) (74 - 85)  BP: 104/75 (04 Feb 2018 10:00) (99/70 - 128/92)  BP(mean): 84 (04 Feb 2018 10:00) (79 - 93)  RR: 18 (04 Feb 2018 10:00) (18 - 19)  SpO2: 97% (04 Feb 2018 10:00) (97% - 97%)    PHYSICAL EXAM:  Constitutional:no acute distress  Eyes:WALT, EOMI  Ear/Nose/Throat: no oral lesions, 	  Respiratory: clear BL  Cardiovascular: S1S2  Gastrointestinal:soft, (+) BS, no tenderness  Extremities:no e/e/c right knee with some swelling compared to the left  No Lymphadenopathy  IV sites not inflammed.    LABS:                        11.9   9.0   )-----------( 216      ( 04 Feb 2018 06:49 )             35.4     02-04    138  |  101  |  21  ----------------------------<  99  4.7   |  28  |  1.44<H>    Ca    9.4      04 Feb 2018 06:49      PT/INR - ( 04 Feb 2018 06:49 )   PT: 16.1 sec;   INR: 1.48 ratio         PTT - ( 04 Feb 2018 06:49 )  PTT:65.3 sec      MICROBIOLOGY:      HIV-1 RNA Quantitative, Viral Load (02.02.18 @ 19:25)    HIV-1 Viral Load Result: NOT DET.    HIV-1 RNA Quantitative, Viral Load:   NOT DET.    HIV-1 RNA Quantitative, Vir Load Interp: See Comment Viral loads lower than 12 copies/ml cannot be detected reliably. Thus, a  Not Detected result does not necessarily rule out HIV-1 infection.  METHOD: Transcription mediated amplification (TMA) – [employer].  Abbreviation:  NOT DET. and not det. = Not Detected  n/a = not available  .    HIV-1 RNA Quantitative, Viral Load Log: NOT DET. lg /mL    HIV-1/2 Antigen/Antibody Screen by [facility] (02.02.18 @ 19:25)    HIV-1/2 Combo Result: Nonreact: The HIV Ag/Ab Combo test performed screens for HIV-1 p24 antigen,  antibodies to HIV-1 (group M and group O), and antibodies to HIV-2. All  specimens repeatedly reactive will reflex to an HIV 1/2 antibody  confirmation and differentiation test. This assay detects p24 antigen  which may be present prior to the development of HIV antibodies,  therefore a reactive result with a negative HIV 1/2 AB Confirmation  should be followed up with HIV-1 RNA, HIV-2 RNA and repeat testing in 4-8  weeks. A nonreactive result does not preclude previous exposure to or  infection with HIV-1 or HIV-2. [facility] prohibits disclosure of this  result to any unauthorized party.          RECENT CULTURES:      RADIOLOGY & ADDITIONAL STUDIES:

## 2018-02-04 NOTE — PROGRESS NOTE ADULT - SUBJECTIVE AND OBJECTIVE BOX
VITAL SIGNS    Telemetry:  SR 60-80  Vital Signs Last 24 Hrs  T(C): 36.6 (02-04-18 @ 10:00), Max: 36.8 (02-03-18 @ 21:56)  T(F): 97.9 (02-04-18 @ 10:00), Max: 98.2 (02-03-18 @ 21:56)  HR: 78 (02-04-18 @ 10:00) (74 - 85)  BP: 104/75 (02-04-18 @ 10:00) (99/70 - 128/92)  RR: 18 (02-04-18 @ 10:00) (18 - 19)  SpO2: 97% (02-04-18 @ 10:00) (96% - 97%)            02-03 @ 07:01  -  02-04 @ 07:00  --------------------------------------------------------  IN: 1896 mL / OUT: 2850 mL / NET: -954 mL     Daily   Admit Wt: Drug Dosing Weight  Height (cm): 172.72 (01 Feb 2018 18:52)  Weight (kg): 78.9 (01 Feb 2018 18:52)  BMI (kg/m2): 26.4 (01 Feb 2018 18:52)  BSA (m2): 1.93 (01 Feb 2018 18:52)    MEDICATIONS  acetaminophen   Tablet 650 milliGRAM(s) Oral every 6 hours PRN  amiodarone    Tablet 200 milliGRAM(s) Oral daily  ascorbic acid 500 milliGRAM(s) Oral two times a day  carvedilol 25 milliGRAM(s) Oral every 12 hours  docusate sodium 100 milliGRAM(s) Oral three times a day  ferrous    sulfate 325 milliGRAM(s) Oral daily  furosemide    Tablet 20 milliGRAM(s) Oral every other day  heparin  Infusion 9 Unit(s)/Hr IV Continuous <Continuous>  hydrocortisone 2.5% Cream 1 Application(s) Topical two times a day  mexiletine 150 milliGRAM(s) Oral two times a day  pantoprazole    Tablet 40 milliGRAM(s) Oral before breakfast  QUEtiapine 50 milliGRAM(s) Oral at bedtime  spironolactone 25 milliGRAM(s) Oral daily  warfarin 5 milliGRAM(s) Oral once    PHYSICAL EXAM  Subjective:NAD  Neurology: alert and oriented x 3, nonfocal, no gross deficits  CV : LVAD sounds  Lungs: CTA   Abdomen: soft, NT,ND, (+ )BM  :  voiding  Extremities: -c/c/e      LABS  02-04    138  |  101  |  21  ----------------------------<  99  4.7   |  28  |  1.44<H>    Ca    9.4      04 Feb 2018 06:49                                   11.9   9.0   )-----------( 216      ( 04 Feb 2018 06:49 )             35.4          PT/INR - ( 04 Feb 2018 06:49 )   PT: 16.1 sec;   INR: 1.48 ratio         PTT - ( 04 Feb 2018 06:49 )  PTT:65.3 sec       PAST MEDICAL & SURGICAL HISTORY:  GIB (gastrointestinal bleeding)  Ventricular fibrillation: s/p AICD  PAF (paroxysmal atrial fibrillation): on xarelto  Non-Ischemic Cardiomyopathy  SVT (Supraventricular Tachycardia)  HTN  CHF (Congestive Heart Failure)  LVAD (left ventricular assist device) present  Status post left hip replacement  History of Prior Ablation Treatment: for afib  AICD (Automatic Cardioverter/Defibrillator) Present: [employer] with 1 [employer] lead4/1/09- explanted and replaced with Medtronic 2 leads on 9/2/09

## 2018-02-04 NOTE — PROGRESS NOTE ADULT - SUBJECTIVE AND OBJECTIVE BOX
Behavioral Cardiology Progress Note     HPI:  Mr. Baez is a 67 year old man with Chronic Systolic Heart Failure (ACC/AHA Stage D) due to NICMP s/p LVAD 6/12/17, GIB s/p Cautery (7/25/2017), known AV Malformations, Chronic Anemia due to numerous GIBs (off AC), A-Fib, VT s/p AICD. Pt admitted due to elevated LDH level of 646 for further evaluation and observation.      Family meeting: Together with LVAD coordinator, heart transplant coordinator, and cardiac surgeon, met with patient and his designated support system ("god-brother" Nawaf Barahona and close friend, Tahira) to discuss their availability and willingness to assist patient post heart transplant.  Both agreed to be available as needed to assist with medication (Tahira manages this now), transportation to medical appointments, and assistance with diet.  They have been providing ongoing assistance since LVAD implant and have demonstrated reliability and consistency.  When asked about heart transplant education, patient was able to teach back need for immunosuppressant medication for life and importance of taking it as prescribed every day.  With small cue was able to recall information on diet.

## 2018-02-04 NOTE — PROGRESS NOTE ADULT - ASSESSMENT
67M PMH Chronic Systolic Heart Failure (ACC/AHA Stage D) due to NICMP s/p LVAD 6/12/17, GIB s/p Cautery (7/25/2017), known AV Malformations, Chronic Anemia due to numerous GIBs (off AC), A-Fib, VT s/p AICD. Pt admitted due to elevated LDH level of 646 (previously 466) for further evaluation and observation.   2/2 VSS ; INR 1.3- continue heparin and coumadin   meds as per CHF team  2/3 - monitor LDH twice a day; continue heparin and coumadin - INR 1.5- coumadin 3 mg today; continue meds as per CHF team   renal sono to evaluate left renal cyst   2/4 RAMP echo

## 2018-02-04 NOTE — PROGRESS NOTE ADULT - ASSESSMENT
66 yo man with a history of a nonischemic cardiomyopathy admitted with anemia, GI bleeding and being further evaluated for a possible transplant listing    Follow up on HIV ab and HIV viral load PCR test- both are negative  Please note that he is toxo ab. + and will need Bactrim prophylaxis  He will need vaccination for pneumococcal pneumonia  He may need a second seasonal influenza vaccine  Needs HBV vaccine series  HAV vaccine series  Would repeat quantiferon gold  Would image his right knee with CT scan/ consider rheumatology consult    Will follow with you    Gary Lujan MD  [phone]  After 5pm/weekends [phone]

## 2018-02-05 LAB
ANION GAP SERPL CALC-SCNC: 10 MMOL/L — SIGNIFICANT CHANGE UP (ref 5–17)
APTT BLD: 54.5 SEC — HIGH (ref 27.5–37.4)
BUN SERPL-MCNC: 30 MG/DL — HIGH (ref 7–23)
CALCIUM SERPL-MCNC: 9.4 MG/DL — SIGNIFICANT CHANGE UP (ref 8.4–10.5)
CHLORIDE SERPL-SCNC: 100 MMOL/L — SIGNIFICANT CHANGE UP (ref 96–108)
CO2 SERPL-SCNC: 25 MMOL/L — SIGNIFICANT CHANGE UP (ref 22–31)
CREAT SERPL-MCNC: 1.51 MG/DL — HIGH (ref 0.5–1.3)
GLUCOSE SERPL-MCNC: 102 MG/DL — HIGH (ref 70–99)
HCT VFR BLD CALC: 35.7 % — LOW (ref 39–50)
HGB BLD-MCNC: 12 G/DL — LOW (ref 13–17)
INR BLD: 1.42 RATIO — HIGH (ref 0.88–1.16)
LDH SERPL L TO P-CCNC: 776 U/L — HIGH (ref 50–242)
MCHC RBC-ENTMCNC: 32.7 PG — SIGNIFICANT CHANGE UP (ref 27–34)
MCHC RBC-ENTMCNC: 33.5 GM/DL — SIGNIFICANT CHANGE UP (ref 32–36)
MCV RBC AUTO: 97.6 FL — SIGNIFICANT CHANGE UP (ref 80–100)
PLATELET # BLD AUTO: 204 K/UL — SIGNIFICANT CHANGE UP (ref 150–400)
POTASSIUM SERPL-MCNC: 4.7 MMOL/L — SIGNIFICANT CHANGE UP (ref 3.5–5.3)
POTASSIUM SERPL-SCNC: 4.7 MMOL/L — SIGNIFICANT CHANGE UP (ref 3.5–5.3)
PROTHROM AB SERPL-ACNC: 15.6 SEC — HIGH (ref 9.8–12.7)
RBC # BLD: 3.66 M/UL — LOW (ref 4.2–5.8)
RBC # FLD: 17.2 % — HIGH (ref 10.3–14.5)
SODIUM SERPL-SCNC: 135 MMOL/L — SIGNIFICANT CHANGE UP (ref 135–145)
WBC # BLD: 8.9 K/UL — SIGNIFICANT CHANGE UP (ref 3.8–10.5)
WBC # FLD AUTO: 8.9 K/UL — SIGNIFICANT CHANGE UP (ref 3.8–10.5)

## 2018-02-05 PROCEDURE — 99232 SBSQ HOSP IP/OBS MODERATE 35: CPT

## 2018-02-05 PROCEDURE — 93750 INTERROGATION VAD IN PERSON: CPT

## 2018-02-05 PROCEDURE — 90832 PSYTX W PT 30 MINUTES: CPT

## 2018-02-05 PROCEDURE — 99223 1ST HOSP IP/OBS HIGH 75: CPT | Mod: 25,GC

## 2018-02-05 RX ORDER — WARFARIN SODIUM 2.5 MG/1
6 TABLET ORAL ONCE
Qty: 0 | Refills: 0 | Status: COMPLETED | OUTPATIENT
Start: 2018-02-05 | End: 2018-02-05

## 2018-02-05 RX ORDER — ASPIRIN/CALCIUM CARB/MAGNESIUM 324 MG
162 TABLET ORAL ONCE
Qty: 0 | Refills: 0 | Status: COMPLETED | OUTPATIENT
Start: 2018-02-05 | End: 2018-02-05

## 2018-02-05 RX ORDER — ASPIRIN/CALCIUM CARB/MAGNESIUM 324 MG
81 TABLET ORAL DAILY
Qty: 0 | Refills: 0 | Status: DISCONTINUED | OUTPATIENT
Start: 2018-02-06 | End: 2018-02-08

## 2018-02-05 RX ADMIN — Medication 162 MILLIGRAM(S): at 20:55

## 2018-02-05 RX ADMIN — Medication 1 APPLICATION(S): at 05:50

## 2018-02-05 RX ADMIN — Medication 500 MILLIGRAM(S): at 05:49

## 2018-02-05 RX ADMIN — Medication 100 MILLIGRAM(S): at 20:56

## 2018-02-05 RX ADMIN — Medication 100 MILLIGRAM(S): at 05:48

## 2018-02-05 RX ADMIN — MEXILETINE HYDROCHLORIDE 150 MILLIGRAM(S): 150 CAPSULE ORAL at 05:48

## 2018-02-05 RX ADMIN — PANTOPRAZOLE SODIUM 40 MILLIGRAM(S): 20 TABLET, DELAYED RELEASE ORAL at 05:48

## 2018-02-05 RX ADMIN — HEPARIN SODIUM 9 UNIT(S)/HR: 5000 INJECTION INTRAVENOUS; SUBCUTANEOUS at 18:39

## 2018-02-05 RX ADMIN — QUETIAPINE FUMARATE 50 MILLIGRAM(S): 200 TABLET, FILM COATED ORAL at 20:56

## 2018-02-05 RX ADMIN — Medication 325 MILLIGRAM(S): at 12:08

## 2018-02-05 RX ADMIN — WARFARIN SODIUM 6 MILLIGRAM(S): 2.5 TABLET ORAL at 20:56

## 2018-02-05 RX ADMIN — SPIRONOLACTONE 25 MILLIGRAM(S): 25 TABLET, FILM COATED ORAL at 05:49

## 2018-02-05 RX ADMIN — CARVEDILOL PHOSPHATE 25 MILLIGRAM(S): 80 CAPSULE, EXTENDED RELEASE ORAL at 17:33

## 2018-02-05 RX ADMIN — AMIODARONE HYDROCHLORIDE 200 MILLIGRAM(S): 400 TABLET ORAL at 05:49

## 2018-02-05 RX ADMIN — Medication 1 APPLICATION(S): at 17:33

## 2018-02-05 RX ADMIN — Medication 500 MILLIGRAM(S): at 17:33

## 2018-02-05 RX ADMIN — MEXILETINE HYDROCHLORIDE 150 MILLIGRAM(S): 150 CAPSULE ORAL at 18:31

## 2018-02-05 RX ADMIN — CARVEDILOL PHOSPHATE 25 MILLIGRAM(S): 80 CAPSULE, EXTENDED RELEASE ORAL at 05:48

## 2018-02-05 NOTE — PROGRESS NOTE ADULT - ASSESSMENT
Patient reported mood, appetite, and sleep as "good", stating that he has been eating 3 meals per day and sleeping 10+ hours, denying excessive sleep.  He denied having current stressors or pain, other than that due to arthritis in his knee when walking.  He stated that he has been walking 2-3 laps per day without SOB.  He demonstrated tenuous understanding of reason for current hospital admission ("something was low") and could not state the significance of the out-of-range value (i.e. the implications of elevated LDH).  In regards to education on heart transplant guidelines, patient remarked that "I have to make sure my medication is good all the time…and eat healthy".  He could not articulate more information regarding medication (e.g., type).  He was aware that there will be dietary restrictions but could not articulate what they will be.  Patient demonstrated some recognition of medication and dietary guidelines for heart transplant upon education.  Denied current problems with LVAD and remarked that he recently spoke with LVAD coordinator. Patient reported mood, appetite, and sleep as "good", stating that he has been eating 3 meals per day and sleeping 10+ hours, denying excessive sleep.  He denied having current stressors or pain, other than that due to arthritis in his knee when walking.  He stated that he has been walking 2-3 laps per day without SOB.  He demonstrated tenuous understanding of reason for current hospital admission ("something was low") and could not state the significance of the out-of-range value (i.e. the implications of elevated LDH).  In regards to education on heart transplant guidelines, patient remarked that "I have to make sure my medication is good all the time…and eat healthy".  He demonstrated recall of information that changes in medication may occur and would require him to maintain close contact with his treatment team.  He could not articulate any further information regarding medication (e.g., type).  He was aware that there will be dietary restrictions but could not articulate what they will be.  Patient demonstrated some recognition of medication and dietary guidelines for heart transplant upon education, i.e. he required cues for recall of information.  Denied current problems with LVAD and remarked that he recently spoke with LVAD coordinator.    Recommendation: Patient would benefit from frequent review of information regarding dietary restrictions with teach-back method. Patient reported mood, appetite, and sleep as "good", stating that he has been eating 3 meals per day and sleeping 10+ hours, denying excessive sleep.  He denied having current stressors or pain, other than that due to arthritis in his knee when walking.  He stated that he has been walking 2-3 laps per day without SOB.  He demonstrated tenuous understanding of reason for current hospital admission ("something was low") and could not state the significance of the out-of-range value (i.e. the implications of elevated LDH).  In regards to education on heart transplant guidelines, patient remarked that "I have to make sure my medication is good all the time…and eat healthy".  He demonstrated recall of information that changes in medication may occur and would require him to maintain close contact with his treatment team.  He could not articulate any further information regarding medication (e.g., type).  He was aware that there will be dietary restrictions but could not articulate what they will be.  Patient demonstrated some recognition of medication and dietary guidelines for heart transplant upon education, i.e. he required cues for recall of information.  Denied current problems with LVAD and remarked that he recently spoke with LVAD coordinator.    Recommendation: Patient would benefit from frequent review of information regarding dietary restrictions with teach-back method.    Rosio Dacosta M.A.  Psychology Extern

## 2018-02-05 NOTE — PROGRESS NOTE ADULT - ATTENDING COMMENTS
No complaints, but LDH is still rising. Discussed with Dr. Parker and we will try addition of ASA first before adding Integrilin. Will give  mg tonight then continue 81 mg daily, but if LDH continues to rise, will need to add integrilin. He was listed for OHT today, blood type B, UNOS 1A status. No crossmatch needed (cPRA 0%).    , INR 1.42. MAP 88-99, HR 70-80.  VAD Interrogation without power spikes. No changes made.    Continue warfarin 5 mg QHS for AC. Follow daily LDH.  Please discontinue Amiodarone as it has been linked to primary graft failure post-transplant. Monitor for VT on tele. Continues on mexilitine and Coreg 25 mg BID.

## 2018-02-05 NOTE — PROGRESS NOTE ADULT - SUBJECTIVE AND OBJECTIVE BOX
Patient information: Mr. Baez is a 67-year-old man.    PMH: Chronic Systolic Heart Failure (ACC/AHA Stage D) due to NICMP s/p LVAD 6/12/17, GIB s/p Cautery (7/25/2017), known AV Malformations, Chronic Anemia due to numerous GIBs (off AC), A-Fib, VT s/p AICD. Pt admitted due to elevated LDH level of 646 for further evaluation and observation.  . Pt denies any chest pain, SOB, palpitations, N/D/V, abdominal pain, headaches, changes in vision, dizziness, pain or burning while urinating, swelling, numbness/tingling anywhere, rashes, fever, or recent travel.    Behavioral Health Assessment:    Mood: "Good"    Current stressors: Patient denied current stressors.    Support system/family support: Brother, godbrother, and friend.    Understanding of medical illness and treatment plan: Vague understanding of reasons for current hospitalization and treatment plan.    MSE: Pt seen sitting in chair, eating lunch upon approach. A&Ox3. Well related. Thought process goal directed. No abnormal thought content. Mood euthymic.  Affect appropriate.  Insight poor and judgment adequate.

## 2018-02-05 NOTE — PROGRESS NOTE ADULT - SUBJECTIVE AND OBJECTIVE BOX
INFECTIOUS DISEASES FOLLOW UP-- Sujey Lujan  [phone]    This is a follow up note for this  67yMale with  Heart replaced by heart assist device, feeling well, reports normal BM today      ROS:  CONSTITUTIONAL:  No fever, good appetite  CARDIOVASCULAR:  No chest pain or palpitations  RESPIRATORY:  No dyspnea  GASTROINTESTINAL:  No nausea, vomiting, diarrhea, or abdominal pain  GENITOURINARY:  No dysuria  NEUROLOGIC:  No headache,     Allergies    No Known Allergies    Intolerances        ANTIBIOTICS/RELEVANT:  antimicrobials    immunologic:    OTHER:  acetaminophen   Tablet 650 milliGRAM(s) Oral every 6 hours PRN  amiodarone    Tablet 200 milliGRAM(s) Oral daily  ascorbic acid 500 milliGRAM(s) Oral two times a day  carvedilol 25 milliGRAM(s) Oral every 12 hours  docusate sodium 100 milliGRAM(s) Oral three times a day  ferrous    sulfate 325 milliGRAM(s) Oral daily  furosemide    Tablet 20 milliGRAM(s) Oral every other day  heparin  Infusion 9 Unit(s)/Hr IV Continuous <Continuous>  hydrocortisone 2.5% Cream 1 Application(s) Topical two times a day  mexiletine 150 milliGRAM(s) Oral two times a day  pantoprazole    Tablet 40 milliGRAM(s) Oral before breakfast  QUEtiapine 50 milliGRAM(s) Oral at bedtime  spironolactone 25 milliGRAM(s) Oral daily  warfarin 6 milliGRAM(s) Oral once      Objective:  Vital Signs Last 24 Hrs  T(C): 36.9 (2018 18:00), Max: 36.9 (2018 06:25)  T(F): 98.4 (2018 18:00), Max: 98.4 (2018 06:25)  HR: 78 (2018 18:00) (75 - 88)  BP: 112/80 (2018 18:00) (100/84 - 120/82)  BP(mean): 90 (2018 18:00) (84 - 94)  RR: 18 (2018 18:00) (18 - 18)  SpO2: 97% (2018 18:00) (97% - 98%)    PHYSICAL EXAM:  Constitutional:no acute distress  Eyes:WALT, EOMI  Ear/Nose/Throat: no oral lesions, 	  Respiratory: clear BL decreased at bases  Cardiovascular: LVAD sounds  sternotomy healed  LVAd dressing dry and intact  Gastrointestinal:soft, (+) BS, no tenderness  Extremities:no e/e/c, lost toe nail on his  foot spontenaously, no pain  No Lymphadenopathy  IV sites not inflammed.    LABS:                        12.0   8.9   )-----------( 204      ( 2018 06:47 )             35.7     02-    135  |  100  |  30<H>  ----------------------------<  102<H>  4.7   |  25  |  1.51<H>    Ca    9.4      2018 06:47      PT/INR - ( 2018 07:14 )   PT: 15.6 sec;   INR: 1.42 ratio         PTT - ( 2018 07:14 )  PTT:54.5 sec  Urinalysis Basic - ( 2018 14:52 )    Color: Yellow / Appearance: Clear / S.014 / pH: x  Gluc: x / Ketone: Negative  / Bili: Negative / Urobili: Negative   Blood: x / Protein: Negative / Nitrite: Negative   Leuk Esterase: Negative / RBC: x / WBC x   Sq Epi: x / Non Sq Epi: x / Bacteria: x        MICROBIOLOGY:      HIV-1 RNA Quantitative, Viral Load (18 @ 19:25)    HIV-1 Viral Load Result: NOT DET.    HIV-1 RNA Quantitative, Viral Load:   NOT DET.    HIV-1 RNA Quantitative, Vir Load Interp: See Comment Viral loads lower than 12 copies/ml cannot be detected reliably. Thus, a  Not Detected result does not necessarily rule out HIV-1 infection.  METHOD: Transcription mediated amplification (TMA) – motionID technologies.  Abbreviation:  NOT DET. and not det. = Not Detected  n/a = not available  .    HIV-1 RNA Quantitative, Viral Load Log: NOT DET. lg /mL          RECENT CULTURES:  HIV-1/2 Antigen/Antibody Screen by [facility] (18 @ 19:25)    HIV-1/2 Combo Result: Nonreact: The HIV Ag/Ab Combo test performed screens for HIV-1 p24 antigen,  antibodies to HIV-1 (group M and group O), and antibodies to HIV-2. All  specimens repeatedly reactive will reflex to an HIV 1/2 antibody  confirmation and differentiation test. This assay detects p24 antigen  which may be present prior to the development of HIV antibodies,  therefore a reactive result with a negative HIV 1/2 AB Confirmation  should be followed up with HIV-1 RNA, HIV-2 RNA and repeat testing in 4-8  weeks. A nonreactive result does not preclude previous exposure to or  infection with HIV-1 or HIV-2. [facility] prohibits disclosure of this  result to any unauthorized party.        RADIOLOGY & ADDITIONAL STUDIES:    < from: US Renal (18 @ 10:57) >  IMPRESSION:     Stable left renal cyst.    < end of copied text >

## 2018-02-05 NOTE — PROGRESS NOTE ADULT - ASSESSMENT
66 yo man with a history of a nonischemic cardiomyopathy admitted with anemia, GI bleeding and being further evaluated for a possible transplant listing     HIV ab and HIV viral load PCR test- both are negative. Patient is HIV -  Please note that he is toxo ab. + and will need Bactrim prophylaxis  He will need vaccination for pneumococcal pneumonia  He may need a second seasonal influenza vaccine  Needs HBV vaccine series  HAV vaccine series  Quantiferon gold testing -    Can give the first dose of HBV and HAV vaccines in the hospital and then follow up with ID clinic  Please give a dose of Prevnar prior to discharge    There are no Infectious Disease contra-indications to listing the patient for a heart transplant at his time.    Gary Lujan MD  [phone]  After 5pm/weekends [phone]

## 2018-02-05 NOTE — PROGRESS NOTE ADULT - ATTENDING COMMENTS
Rosio Dacosta M.A.  Psychology Extern Pt benefits from ongoing review of heart transplant education (necessity of taking immunosuppressant medication as prescribed, staying in close communication with LVAD team, following all dietary guidelines, frequent follow up with MD for biopsies post HT).  Indicated a strong commitment to following all instructions; has a strong and stable support system who have agreed to provide ongoing support.      Agree with Psychology Extern's assessment; reviewed and edited where appropriate.      Marga Martines, PhD  [phone]

## 2018-02-05 NOTE — PROGRESS NOTE ADULT - PROBLEM SELECTOR PLAN 1
Continue Amiodarone 200 daily/ coreg and mexitil   anticoagulation with heparin gttp and coumadin  dressing to right great toe for Onychptosis. Consider antifungal Continue Amiodarone 200 daily/ coreg and mexitil   anticoagulation with heparin gttp and coumadin  dressing to right great toe; Onychptosis.

## 2018-02-05 NOTE — PROGRESS NOTE ADULT - SUBJECTIVE AND OBJECTIVE BOX
24H hour events:     Feels well, denies any joshua blood in his bowel movements or elsewhere.    Denies: HA/palpitations/CP/SOB/PND/orthopnea/LE edema    LVAD Interrogation: HM2  RPMs: 9200  Flow: 5.1  Power: 5.5  PI: 6.9  Event:  No programming changes were made    MAP goal 70-80  INR goal: 2 - 3  current anticoagulation: heparin gtt.          MEDICATIONS:  amiodarone    Tablet 200 milliGRAM(s) Oral daily  carvedilol 25 milliGRAM(s) Oral every 12 hours  furosemide    Tablet 20 milliGRAM(s) Oral every other day  heparin  Infusion 9 Unit(s)/Hr IV Continuous <Continuous>  mexiletine 150 milliGRAM(s) Oral two times a day  spironolactone 25 milliGRAM(s) Oral daily  warfarin 6 milliGRAM(s) Oral once  acetaminophen   Tablet 650 milliGRAM(s) Oral every 6 hours PRN  QUEtiapine 50 milliGRAM(s) Oral at bedtime  docusate sodium 100 milliGRAM(s) Oral three times a day  pantoprazole    Tablet 40 milliGRAM(s) Oral before breakfast  ascorbic acid 500 milliGRAM(s) Oral two times a day  ferrous    sulfate 325 milliGRAM(s) Oral daily  hydrocortisone 2.5% Cream 1 Application(s) Topical two times a day        PHYSICAL EXAM:  T(C): 36.8 (02-05-18 @ 14:00), Max: 36.9 (02-05-18 @ 06:25)  HR: 77 (02-05-18 @ 14:00) (75 - 88)  BP: 100/84 (02-05-18 @ 14:00) (100/84 - 120/82)  RR: 18 (02-05-18 @ 14:00) (18 - 18)  SpO2: 97% (02-05-18 @ 14:00) (97% - 98%)  Wt(kg): --  I&O's Summary    04 Feb 2018 07:01  -  05 Feb 2018 07:00  --------------------------------------------------------  IN: 1140 mL / OUT: 1550 mL / NET: -410 mL    05 Feb 2018 07:01  -  05 Feb 2018 16:20  --------------------------------------------------------  IN: 840 mL / OUT: 900 mL / NET: -60 mL        Appearance: NAD  HEENT: Normal JVP  Cardiovascular: Audible S1 S2  Typical LVAD sounds; Driveline site: clean, dry, intact  Respiratory: Lungs clear to auscultation  Psychiatry: A & O x 3, Mood & affect appropriate  Gastrointestinal:  Soft, Non-tender, + BS	  Skin: Warm, well perfused  Neurologic: Non-focal  Extremities: No cyanosis, clubbing or edema    LABS:    CBC Full  -  ( 05 Feb 2018 06:47 )  WBC Count : 8.9 K/uL  Hemoglobin : 12.0 g/dL  Hematocrit : 35.7 %  Platelet Count - Automated : 204 K/uL  Mean Cell Volume : 97.6 fl  Mean Cell Hemoglobin : 32.7 pg  Mean Cell Hemoglobin Concentration : 33.5 gm/dL      02-05    135  |  100  |  30<H>  ----------------------------<  102<H>  4.7   |  25  |  1.51<H>  02-04    138  |  101  |  21  ----------------------------<  99  4.7   |  28  |  1.44<H>    Ca    9.4      05 Feb 2018 06:47  Ca    9.4      04 Feb 2018 06:49      TELEMETRY: ANA 60-80s 24H hour events:     Feels well, denies any joshua blood in his bowel movements or elsewhere.    Denies: HA/palpitations/CP/SOB/PND/orthopnea/LE edema    LVAD Interrogation: HM2  RPMs: 9200  Flow: 5.1  Power: 5.5  PI: 6.9  Event: scant PI events, no power spikes  No programming changes were made    MAP goal 70-80  INR goal: 2 - 3  current anticoagulation: heparin gtt.          MEDICATIONS:  amiodarone    Tablet 200 milliGRAM(s) Oral daily  carvedilol 25 milliGRAM(s) Oral every 12 hours  furosemide    Tablet 20 milliGRAM(s) Oral every other day  heparin  Infusion 9 Unit(s)/Hr IV Continuous <Continuous>  mexiletine 150 milliGRAM(s) Oral two times a day  spironolactone 25 milliGRAM(s) Oral daily  warfarin 6 milliGRAM(s) Oral once  acetaminophen   Tablet 650 milliGRAM(s) Oral every 6 hours PRN  QUEtiapine 50 milliGRAM(s) Oral at bedtime  docusate sodium 100 milliGRAM(s) Oral three times a day  pantoprazole    Tablet 40 milliGRAM(s) Oral before breakfast  ascorbic acid 500 milliGRAM(s) Oral two times a day  ferrous    sulfate 325 milliGRAM(s) Oral daily  hydrocortisone 2.5% Cream 1 Application(s) Topical two times a day        PHYSICAL EXAM:  T(C): 36.8 (02-05-18 @ 14:00), Max: 36.9 (02-05-18 @ 06:25)  HR: 77 (02-05-18 @ 14:00) (75 - 88)  BP: 100/84 (02-05-18 @ 14:00) (100/84 - 120/82)  RR: 18 (02-05-18 @ 14:00) (18 - 18)  SpO2: 97% (02-05-18 @ 14:00) (97% - 98%)  Wt(kg): --  I&O's Summary    04 Feb 2018 07:01  -  05 Feb 2018 07:00  --------------------------------------------------------  IN: 1140 mL / OUT: 1550 mL / NET: -410 mL    05 Feb 2018 07:01  -  05 Feb 2018 16:20  --------------------------------------------------------  IN: 840 mL / OUT: 900 mL / NET: -60 mL        Appearance: NAD  HEENT: Normal JVP  Cardiovascular: Audible S1 S2  Typical LVAD sounds; Driveline site: clean, dry, intact  Respiratory: Lungs clear to auscultation  Psychiatry: A & O x 3, Mood & affect appropriate  Gastrointestinal:  Soft, Non-tender, + BS	  Skin: Warm, well perfused  Neurologic: Non-focal  Extremities: No cyanosis, clubbing or edema    LABS:    CBC Full  -  ( 05 Feb 2018 06:47 )  WBC Count : 8.9 K/uL  Hemoglobin : 12.0 g/dL  Hematocrit : 35.7 %  Platelet Count - Automated : 204 K/uL  Mean Cell Volume : 97.6 fl  Mean Cell Hemoglobin : 32.7 pg  Mean Cell Hemoglobin Concentration : 33.5 gm/dL      02-05    135  |  100  |  30<H>  ----------------------------<  102<H>  4.7   |  25  |  1.51<H>  02-04    138  |  101  |  21  ----------------------------<  99  4.7   |  28  |  1.44<H>    Ca    9.4      05 Feb 2018 06:47  Ca    9.4      04 Feb 2018 06:49      TELEMETRY: NSR 60-80s

## 2018-02-05 NOTE — PROGRESS NOTE ADULT - ASSESSMENT
67M PMH Chronic Systolic Heart Failure (ACC/AHA Stage D) due to NICMP s/p LVAD 6/12/17, GIB s/p Cautery (7/25/2017), known AV Malformations, Chronic Anemia due to numerous GIBs (off AC), A-Fib, VT s/p AICD. Pt admitted due to elevated LDH level of 646 (previously 466) for further evaluation and observation.   2/2 VSS ; INR 1.3- continue heparin and coumadin   meds as per CHF team  2/3 - monitor LDH twice a day; continue heparin and coumadin - INR 1.5- coumadin 3 mg today; continue meds as per CHF team   renal sono to evaluate left renal cyst   2/4 RAMP echo, no speed adjustments made  2/5 . Transplant evaluation in progress

## 2018-02-05 NOTE — PROGRESS NOTE ADULT - SUBJECTIVE AND OBJECTIVE BOX
VITAL SIGNS    Telemetry:  SR 60-80  Vital Signs Last 24 Hrs  T(C): 36.7 (18 @ 10:00), Max: 36.9 (18 @ 06:25)  T(F): 98.1 (18 @ 10:00), Max: 98.4 (18 @ 06:25)  HR: 78 (18 @ 10:00) (75 - 88)  BP: 105/74 (18 @ 10:00) (105/74 - 120/82)  RR: 18 (18 @ 10:00) (18 - 18)  SpO2: 98% (18 @ 10:00) (97% - 98%)             @ 07:01  -   @ 07:00  --------------------------------------------------------  IN: 1140 mL / OUT: 1550 mL / NET: -410 mL     @ 07:01  -  05 @ 15:18  --------------------------------------------------------  IN: 840 mL / OUT: 900 mL / NET: -60 mL       Daily     Daily Weight in k.7 (2018 09:24)  Admit Wt: Drug Dosing Weight  Height (cm): 172.72 (2018 18:52)  Weight (kg): 77.7 (2018 09:24)  BMI (kg/m2): 26 (2018 09:24)  BSA (m2): 1.91 (2018 09:24)      MEDICATIONS  acetaminophen   Tablet 650 milliGRAM(s) Oral every 6 hours PRN  amiodarone    Tablet 200 milliGRAM(s) Oral daily  ascorbic acid 500 milliGRAM(s) Oral two times a day  carvedilol 25 milliGRAM(s) Oral every 12 hours  docusate sodium 100 milliGRAM(s) Oral three times a day  ferrous    sulfate 325 milliGRAM(s) Oral daily  furosemide    Tablet 20 milliGRAM(s) Oral every other day  heparin  Infusion 9 Unit(s)/Hr IV Continuous <Continuous>  hydrocortisone 2.5% Cream 1 Application(s) Topical two times a day  mexiletine 150 milliGRAM(s) Oral two times a day  pantoprazole    Tablet 40 milliGRAM(s) Oral before breakfast  QUEtiapine 50 milliGRAM(s) Oral at bedtime  spironolactone 25 milliGRAM(s) Oral daily      PHYSICAL EXAM  Subjective: NAD OOB to chair  Neurology: alert and oriented x 3, nonfocal, no gross deficits  CV : LVAD sounds  Sternal Wound :  CDI , Stable  Lungs: CTA b/l  Abdomen: soft, NT,ND, (+ )BM  :  voiding  Extremities:   -c/c/e right partial great toe nail thickened and  from the nail bed     LABS      135  |  100  |  30<H>  ----------------------------<  102<H>  4.7   |  25  |  1.51<H>    Ca    9.4      2018 06:47                                   12.0   8.9   )-----------( 204      ( 2018 06:47 )             35.7          PT/INR - ( 2018 07:14 )   PT: 15.6 sec;   INR: 1.42 ratio         PTT - ( 2018 07:14 )  PTT:54.5 sec       PAST MEDICAL & SURGICAL HISTORY:  GIB (gastrointestinal bleeding)  Ventricular fibrillation: s/p AICD  PAF (paroxysmal atrial fibrillation): on xarelto  Non-Ischemic Cardiomyopathy  SVT (Supraventricular Tachycardia)  HTN  CHF (Congestive Heart Failure)  LVAD (left ventricular assist device) present  Status post left hip replacement  History of Prior Ablation Treatment: for afib  AICD (Automatic Cardioverter/Defibrillator) Present: [employer] with 1 [employer] lead09- explanted and replaced with [employer] 2 leads on 09

## 2018-02-05 NOTE — PROGRESS NOTE ADULT - ASSESSMENT
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 He was resumed on coumadin but remained subtherapeutic and due to inability to use Lovenox given his history of GIBs he was admitted  and started on a heparin gtt and coumadin. Currently feels well, but continues to have a rising LDH, despite a therapeutic PTT.    # NICM LV EF 20% Stage D HF s/p HM2 LVAD with concern for pump thrombosis  - Echo ramp study concerning for pump thrombosis, continue with heparin gtt and Coumadin, will discuss if adding anti-platelets would be beneficial  - Appears euvolemic on exam, c/w furosemide 20mg qOD as well as Coreg 25mg BID and spironolactone 25mg qDay  - Elevated MAPs, will discuss if additional afterload reduction, e.g. 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 He was resumed on coumadin but remained subtherapeutic and due to inability to use Lovenox given his history of GIBs he was admitted  and started on a heparin gtt and coumadin. Currently feels well, but continues to have a rising LDH, despite a therapeutic PTT.    # NICM LV EF 20% Stage D HF s/p HM2 LVAD with concern for pump thrombosis  - Echo ramp study concerning for pump thrombosis, continue with heparin gtt and Coumadin, will add ASA 162mg PO now and ASA 81mg tomorrow AM  - Appears euvolemic on exam, c/w furosemide 20mg qOD as well as Coreg 25mg BID and spironolactone 25mg qDay  - Elevated MAPs, will discuss if additional afterload reduction, e.g. w/ HDZN would be beneficial at current    Saurabh Rojas  Cardiology - Heart Failure Fellow  [phone]

## 2018-02-06 LAB
ANION GAP SERPL CALC-SCNC: 12 MMOL/L — SIGNIFICANT CHANGE UP (ref 5–17)
APTT BLD: 66.6 SEC — HIGH (ref 27.5–37.4)
BUN SERPL-MCNC: 36 MG/DL — HIGH (ref 7–23)
CALCIUM SERPL-MCNC: 9.3 MG/DL — SIGNIFICANT CHANGE UP (ref 8.4–10.5)
CHLORIDE SERPL-SCNC: 101 MMOL/L — SIGNIFICANT CHANGE UP (ref 96–108)
CO2 SERPL-SCNC: 24 MMOL/L — SIGNIFICANT CHANGE UP (ref 22–31)
CREAT SERPL-MCNC: 1.62 MG/DL — HIGH (ref 0.5–1.3)
GLUCOSE SERPL-MCNC: 106 MG/DL — HIGH (ref 70–99)
HCT VFR BLD CALC: 34.7 % — LOW (ref 39–50)
HGB BLD-MCNC: 11.5 G/DL — LOW (ref 13–17)
INR BLD: 1.72 RATIO — HIGH (ref 0.88–1.16)
LDH SERPL L TO P-CCNC: 895 U/L — HIGH (ref 50–242)
MCHC RBC-ENTMCNC: 32.1 PG — SIGNIFICANT CHANGE UP (ref 27–34)
MCHC RBC-ENTMCNC: 33.1 GM/DL — SIGNIFICANT CHANGE UP (ref 32–36)
MCV RBC AUTO: 97.2 FL — SIGNIFICANT CHANGE UP (ref 80–100)
PLATELET # BLD AUTO: 216 K/UL — SIGNIFICANT CHANGE UP (ref 150–400)
POTASSIUM SERPL-MCNC: 4.8 MMOL/L — SIGNIFICANT CHANGE UP (ref 3.5–5.3)
POTASSIUM SERPL-SCNC: 4.8 MMOL/L — SIGNIFICANT CHANGE UP (ref 3.5–5.3)
PROTHROM AB SERPL-ACNC: 18.8 SEC — HIGH (ref 9.8–12.7)
RBC # BLD: 3.57 M/UL — LOW (ref 4.2–5.8)
RBC # FLD: 17.4 % — HIGH (ref 10.3–14.5)
SODIUM SERPL-SCNC: 137 MMOL/L — SIGNIFICANT CHANGE UP (ref 135–145)
WBC # BLD: 10.5 K/UL — SIGNIFICANT CHANGE UP (ref 3.8–10.5)
WBC # FLD AUTO: 10.5 K/UL — SIGNIFICANT CHANGE UP (ref 3.8–10.5)

## 2018-02-06 PROCEDURE — 99232 SBSQ HOSP IP/OBS MODERATE 35: CPT

## 2018-02-06 PROCEDURE — 93750 INTERROGATION VAD IN PERSON: CPT

## 2018-02-06 PROCEDURE — 90834 PSYTX W PT 45 MINUTES: CPT

## 2018-02-06 PROCEDURE — 99233 SBSQ HOSP IP/OBS HIGH 50: CPT | Mod: 25,GC

## 2018-02-06 RX ORDER — HEPATITIS A VIRUS VACCINE 50 UNIT/ML
1 SYRINGE (ML) INTRAMUSCULAR ONCE
Qty: 0 | Refills: 0 | Status: COMPLETED | OUTPATIENT
Start: 2018-02-06 | End: 2018-02-07

## 2018-02-06 RX ORDER — EPTIFIBATIDE 2 MG/ML
0.5 INJECTION, SOLUTION INTRAVENOUS
Qty: 75 | Refills: 0 | Status: DISCONTINUED | OUTPATIENT
Start: 2018-02-06 | End: 2018-02-07

## 2018-02-06 RX ORDER — WARFARIN SODIUM 2.5 MG/1
4 TABLET ORAL ONCE
Qty: 0 | Refills: 0 | Status: COMPLETED | OUTPATIENT
Start: 2018-02-06 | End: 2018-02-06

## 2018-02-06 RX ORDER — HEPATITIS B VIRUS VACCINE,RECB 20 MCG/ML
1 VIAL (ML) INTRAMUSCULAR ONCE
Qty: 0 | Refills: 0 | Status: COMPLETED | OUTPATIENT
Start: 2018-02-06 | End: 2018-02-07

## 2018-02-06 RX ORDER — HEPATITIS A VIRUS VACCINE 50 UNIT/ML
1 SYRINGE (ML) INTRAMUSCULAR ONCE
Qty: 0 | Refills: 0 | Status: DISCONTINUED | OUTPATIENT
Start: 2018-02-06 | End: 2018-02-06

## 2018-02-06 RX ORDER — EPTIFIBATIDE 2 MG/ML
1 INJECTION, SOLUTION INTRAVENOUS
Qty: 75 | Refills: 0 | Status: DISCONTINUED | OUTPATIENT
Start: 2018-02-06 | End: 2018-02-06

## 2018-02-06 RX ORDER — HYDRALAZINE HCL 50 MG
10 TABLET ORAL EVERY 8 HOURS
Qty: 0 | Refills: 0 | Status: DISCONTINUED | OUTPATIENT
Start: 2018-02-06 | End: 2018-02-07

## 2018-02-06 RX ORDER — HEPARIN SODIUM 5000 [USP'U]/ML
950 INJECTION INTRAVENOUS; SUBCUTANEOUS
Qty: 25000 | Refills: 0 | Status: DISCONTINUED | OUTPATIENT
Start: 2018-02-06 | End: 2018-02-07

## 2018-02-06 RX ADMIN — Medication 20 MILLIGRAM(S): at 11:28

## 2018-02-06 RX ADMIN — MEXILETINE HYDROCHLORIDE 150 MILLIGRAM(S): 150 CAPSULE ORAL at 18:35

## 2018-02-06 RX ADMIN — HEPARIN SODIUM 9 UNIT(S)/HR: 5000 INJECTION INTRAVENOUS; SUBCUTANEOUS at 11:28

## 2018-02-06 RX ADMIN — CARVEDILOL PHOSPHATE 25 MILLIGRAM(S): 80 CAPSULE, EXTENDED RELEASE ORAL at 06:23

## 2018-02-06 RX ADMIN — EPTIFIBATIDE 6.22 MICROGRAM(S)/KG/MIN: 2 INJECTION, SOLUTION INTRAVENOUS at 16:01

## 2018-02-06 RX ADMIN — Medication 10 MILLIGRAM(S): at 16:04

## 2018-02-06 RX ADMIN — MEXILETINE HYDROCHLORIDE 150 MILLIGRAM(S): 150 CAPSULE ORAL at 06:23

## 2018-02-06 RX ADMIN — Medication 325 MILLIGRAM(S): at 11:29

## 2018-02-06 RX ADMIN — Medication 100 MILLIGRAM(S): at 21:50

## 2018-02-06 RX ADMIN — Medication 100 MILLIGRAM(S): at 11:28

## 2018-02-06 RX ADMIN — Medication 500 MILLIGRAM(S): at 06:23

## 2018-02-06 RX ADMIN — Medication 1 APPLICATION(S): at 18:35

## 2018-02-06 RX ADMIN — QUETIAPINE FUMARATE 50 MILLIGRAM(S): 200 TABLET, FILM COATED ORAL at 21:50

## 2018-02-06 RX ADMIN — Medication 1 APPLICATION(S): at 06:24

## 2018-02-06 RX ADMIN — CARVEDILOL PHOSPHATE 25 MILLIGRAM(S): 80 CAPSULE, EXTENDED RELEASE ORAL at 18:35

## 2018-02-06 RX ADMIN — Medication 650 MILLIGRAM(S): at 18:35

## 2018-02-06 RX ADMIN — SPIRONOLACTONE 25 MILLIGRAM(S): 25 TABLET, FILM COATED ORAL at 06:23

## 2018-02-06 RX ADMIN — Medication 500 MILLIGRAM(S): at 18:35

## 2018-02-06 RX ADMIN — Medication 100 MILLIGRAM(S): at 06:23

## 2018-02-06 RX ADMIN — Medication 10 MILLIGRAM(S): at 21:51

## 2018-02-06 RX ADMIN — PANTOPRAZOLE SODIUM 40 MILLIGRAM(S): 20 TABLET, DELAYED RELEASE ORAL at 06:23

## 2018-02-06 RX ADMIN — Medication 81 MILLIGRAM(S): at 11:29

## 2018-02-06 RX ADMIN — WARFARIN SODIUM 4 MILLIGRAM(S): 2.5 TABLET ORAL at 21:50

## 2018-02-06 NOTE — PROGRESS NOTE ADULT - ASSESSMENT
67M PMH Chronic Systolic Heart Failure (ACC/AHA Stage D) due to NICMP s/p LVAD 6/12/17, GIB s/p Cautery (7/25/2017), known AV Malformations, Chronic Anemia due to numerous GIBs (off AC), A-Fib, VT s/p AICD. Pt admitted due to elevated LDH level of 646 (previously 466) for further evaluation and observation.   2/2 VSS ; INR 1.3- continue heparin and coumadin   meds as per CHF team  2/3 - monitor LDH twice a day; continue heparin and coumadin - INR 1.5- coumadin 3 mg today; continue meds as per CHF team   renal sono to evaluate left renal cyst   2/4 RAMP echo, no speed adjustments made  2/5 . Transplant evaluation in progress  2/6  Integrilin initiated 67M PMH Chronic Systolic Heart Failure (ACC/AHA Stage D) due to NICMP s/p LVAD 6/12/17, GIB s/p Cautery (7/25/2017), known AV Malformations, Chronic Anemia due to numerous GIBs (off AC), A-Fib, VT s/p AICD. Pt admitted due to elevated LDH level of 646 (previously 466) for further evaluation and observation.   2/2 VSS ; INR 1.3- continue heparin and coumadin   meds as per CHF team  2/3 - monitor LDH twice a day; continue heparin and coumadin - INR 1.5- coumadin 3 mg today; continue meds as per CHF team   renal sono to evaluate left renal cyst   2/4 RAMP echo, no speed adjustments made  2/5 . Transplant evaluation in progress  2/6  Integrilin initiated, hydralazine 10mg tid

## 2018-02-06 NOTE — CHART NOTE - NSCHARTNOTEFT_GEN_A_CORE
Source: Patient [x ]    Family [ ]     other [ ]    Patient seen for follow-up. Per discussion with team patient is currently listed as status 1A for heart transplant. Chart events noted; patient has continued elevated LDH during admission with concerns for pump thrombosis. Nutrition education reviewed on Vitamin K interaction with coumadin, and low sodium diet restrictions. Patient was able to teach back points and acknowledges need to change diet and connection to current condition; however, he seems somewhat apprehensive to make these changes. He receives breakfast at [facility]. Low sodium lunch options were discussed. Patient says he usually has fast food which his brother bring home for dinner 2x/ week in addition to two days of leftovers. Discussed alternatives since patient does not cook. In addition, patient encouraged to read nutrition labels online for fast food to evaluate the best option.     Diet : DASH/TLC      Patient reports  No GI distress     PO intake:   %     Admit weight: 173.9 pounds   Current Weight: 171.2 pounds   Weight fluctuations likely due to fluid shift     Pertinent Medications: MEDICATIONS  (STANDING):  ascorbic acid 500 milliGRAM(s) Oral two times a day  aspirin enteric coated 81 milliGRAM(s) Oral daily  carvedilol 25 milliGRAM(s) Oral every 12 hours  docusate sodium 100 milliGRAM(s) Oral three times a day  eptifibatide Infusion 75 mg/100 mL 1 MICROgram(s)/kG/Min (6.216 mL/Hr) IV Continuous <Continuous>  ferrous    sulfate 325 milliGRAM(s) Oral daily  furosemide    Tablet 20 milliGRAM(s) Oral every other day  heparin  Infusion 950 Unit(s)/Hr (9.5 mL/Hr) IV Continuous <Continuous>  hydrALAZINE 10 milliGRAM(s) Oral every 8 hours  hydrocortisone 2.5% Cream 1 Application(s) Topical two times a day  mexiletine 150 milliGRAM(s) Oral two times a day  pantoprazole    Tablet 40 milliGRAM(s) Oral before breakfast  QUEtiapine 50 milliGRAM(s) Oral at bedtime  spironolactone 25 milliGRAM(s) Oral daily    MEDICATIONS  (PRN):  acetaminophen   Tablet 650 milliGRAM(s) Oral every 6 hours PRN For Temp over 38.3 C (100.94 F)    Pertinent Labs:  Basic Metabolic Panel - STAT (02.06.18 @ 06:52)    Sodium, Serum: 137 mmol/L    Potassium, Serum: 4.8 mmol/L    Chloride, Serum: 101 mmol/L    Carbon Dioxide, Serum: 24 mmol/L    Anion Gap, Serum: 12 mmol/L    Blood Urea Nitrogen, Serum: 36 mg/dL high    Creatinine, Serum: 1.62 mg/dL high    Glucose, Serum: 106 mg/dL high    Calcium, Total Serum: 9.3 mg/dL    Lactate Dehydrogenase, Serum (02.06.18 @ 06:52)    Lactate Dehydrogenase, Serum: 895 U/L high  Hemoglobin: 11.5 g/dL (02.06.18 @ 06:52) low  Hematocrit: 34.7 % (02.06.18 @ 06:52) low     Skin: 1+ B/L LE edema. No pressure ulcers     Estimated Needs:   [x ] no change since previous assessment      Previous Nutrition Diagnosis: Limited adherence to lifestyle modifications       Nutrition Diagnosis is [ x] ongoing-being addressed with continued nutrition education         New Nutrition Diagnosis: [ x] not applicable      Recommend  1. Continue current diet order.  2. Honor food preferences and encourage PO intake as able.   3. Continue to provide diet education as accepted by patient.  4. RD to remain available as needed.        Monitoring and Evaluation:     [ x] PO intake [x ] Tolerance to diet prescription [x ] weights [ x] follow up per protocol    [ ] other:

## 2018-02-06 NOTE — PROGRESS NOTE ADULT - ASSESSMENT
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 He was resumed on coumadin but remained subtherapeutic and due to inability to use Lovenox given his history of GIBs he was admitted  and started on a heparin gtt and coumadin. Currently feels well, but continues to have a rising LDH, despite a therapeutic PTT.    # NICM LV EF 20% Stage D HF s/p HM2 LVAD with concern for pump thrombosis  - Echo ramp study concerning for pump thrombosis, continue with heparin gtt and Coumadin, s/p ASA 162mg PO now and ASA 81mg qDay but still rising LDH  - Plan to start integrilin gtt today  - Appears euvolemic on exam, c/w furosemide 20mg qOD as well as Coreg 25mg BID and spironolactone 25mg qDay  - Elevated MAPs, monitor for now    Saurabh Rojas  Cardiology - Heart Failure Fellow  [phone]

## 2018-02-06 NOTE — PROGRESS NOTE ADULT - ASSESSMENT
Mood upbeat. Sleeping well at night. Appetite good. Indicated he is very excited about being listed for heart transplant and feels good about all the support he receives from friends and staff.  Talked about his granddaughter who is the daughter of his  son.  Has a very close relationship and stated “she is the reason I want to have the heart transplant, I want to be around for her.” Coping well with hospitalization. Since LVAD has been more willing to ask others for help and support.  Able to teach back education on heart transplant related to medication and diet.  Receptive to support and validation.        DX:  Systolic heart failure; r/o Adjustment disorder     Recommendations:   Benefits from ongoing education with teach back of heart transplant education   Behavioral Cardiology will continue to follow

## 2018-02-06 NOTE — PROGRESS NOTE ADULT - SUBJECTIVE AND OBJECTIVE BOX
VITAL SIGNS    Telemetry:  SR 80-90  Vital Signs Last 24 Hrs  T(C): 36.3 (02-06-18 @ 10:10), Max: 36.9 (02-05-18 @ 18:00)  T(F): 97.4 (02-06-18 @ 10:10), Max: 98.4 (02-05-18 @ 18:00)  HR: 82 (02-06-18 @ 10:10) (73 - 91)  BP: 103/68 (02-06-18 @ 10:10) (100/81 - 118/84)  RR: 18 (02-06-18 @ 10:10) (18 - 18)  SpO2: 100% (02-06-18 @ 10:10) (97% - 100%)            02-05 @ 07:01  -  02-06 @ 07:00  --------------------------------------------------------  IN: 948 mL / OUT: 2400 mL / NET: -1452 mL    02-06 @ 07:01  -  02-06 @ 12:47  --------------------------------------------------------  IN: 267 mL / OUT: 400 mL / NET: -133 mL      Daily   Admit Wt: Drug Dosing Weight  Height (cm): 172.72 (01 Feb 2018 18:52)  Weight (kg): 77.7 (05 Feb 2018 09:24)  BMI (kg/m2): 26 (05 Feb 2018 09:24)  BSA (m2): 1.91 (05 Feb 2018 09:24)    MEDICATIONS  acetaminophen   Tablet 650 milliGRAM(s) Oral every 6 hours PRN  ascorbic acid 500 milliGRAM(s) Oral two times a day  aspirin enteric coated 81 milliGRAM(s) Oral daily  carvedilol 25 milliGRAM(s) Oral every 12 hours  docusate sodium 100 milliGRAM(s) Oral three times a day  eptifibatide Infusion 75 mg/100 mL 1 MICROgram(s)/kG/Min IV Continuous <Continuous>  ferrous    sulfate 325 milliGRAM(s) Oral daily  furosemide    Tablet 20 milliGRAM(s) Oral every other day  heparin  Infusion 950 Unit(s)/Hr IV Continuous <Continuous>  hydrocortisone 2.5% Cream 1 Application(s) Topical two times a day  mexiletine 150 milliGRAM(s) Oral two times a day  pantoprazole    Tablet 40 milliGRAM(s) Oral before breakfast  QUEtiapine 50 milliGRAM(s) Oral at bedtime  spironolactone 25 milliGRAM(s) Oral daily      PHYSICAL EXAM  Subjective: NAD OOB chair  Neurology: alert and oriented x 3, nonfocal, no gross deficits  CV : LVAD sounds  Lungs: CTA b/l  Abdomen: soft, NT,ND, (+ )BM  :  voiding  Extremities: -c/c/e    LVAD Interrogation: HM2  Pump Flow: 5.0  Pump Speed: 9000 RPM  Pulse Index: 6.5  Pump Power: 5.5  VAD Events: No alarms.  Driveline evaluation:  No drainage or tenderness.       LABS  02-06    137  |  101  |  36<H>  ----------------------------<  106<H>  4.8   |  24  |  1.62<H>    Ca    9.3      06 Feb 2018 06:52                                   11.5   10.5  )-----------( 216      ( 06 Feb 2018 06:52 )             34.7          PT/INR - ( 06 Feb 2018 06:52 )   PT: 18.8 sec;   INR: 1.72 ratio         PTT - ( 06 Feb 2018 06:52 )  PTT:66.6 sec       PAST MEDICAL & SURGICAL HISTORY:  GIB (gastrointestinal bleeding)  Ventricular fibrillation: s/p AICD  PAF (paroxysmal atrial fibrillation): on xarelto  Non-Ischemic Cardiomyopathy  SVT (Supraventricular Tachycardia)  HTN  CHF (Congestive Heart Failure)  LVAD (left ventricular assist device) present  Status post left hip replacement  History of Prior Ablation Treatment: for afib  AICD (Automatic Cardioverter/Defibrillator) Present: [employer] with 1 [employer] lead4/1/09- explanted and replaced with [employer] 2 leads on 9/2/09

## 2018-02-06 NOTE — PROGRESS NOTE ADULT - SUBJECTIVE AND OBJECTIVE BOX
24H hour events:     Feels well this AM, LDH continues to rise despite starting ASA last night.    Denies: HA/palpitations/CP/SOB/PND/orthopnea/LE edema    LVAD Interrogation: HM 2  RPMs: 9000  Flow: 5  Power: 5.5  PI: 6.5  Event: Scant PI events  No programming changes were made    MAP goal 70-80    current anticoagulation: Coumadin          MEDICATIONS:  aspirin enteric coated 81 milliGRAM(s) Oral daily  carvedilol 25 milliGRAM(s) Oral every 12 hours  furosemide    Tablet 20 milliGRAM(s) Oral every other day  heparin  Infusion 9 Unit(s)/Hr IV Continuous <Continuous>  mexiletine 150 milliGRAM(s) Oral two times a day  spironolactone 25 milliGRAM(s) Oral daily  acetaminophen   Tablet 650 milliGRAM(s) Oral every 6 hours PRN  QUEtiapine 50 milliGRAM(s) Oral at bedtime  docusate sodium 100 milliGRAM(s) Oral three times a day  pantoprazole    Tablet 40 milliGRAM(s) Oral before breakfast  ascorbic acid 500 milliGRAM(s) Oral two times a day  ferrous    sulfate 325 milliGRAM(s) Oral daily  hydrocortisone 2.5% Cream 1 Application(s) Topical two times a day      PHYSICAL EXAM:  T(C): 36.7 (02-06-18 @ 04:52), Max: 36.9 (02-05-18 @ 18:00)  HR: 73 (02-06-18 @ 06:15) (73 - 91)  BP: 113/82 (02-06-18 @ 06:15) (100/81 - 118/84)  RR: 18 (02-06-18 @ 06:15) (18 - 18)  SpO2: 97% (02-06-18 @ 04:52) (97% - 99%)  Wt(kg): --  I&O's Summary    05 Feb 2018 07:01  -  06 Feb 2018 07:00  --------------------------------------------------------  IN: 948 mL / OUT: 2400 mL / NET: -1452 mL    06 Feb 2018 07:01  -  06 Feb 2018 11:50  --------------------------------------------------------  IN: 27 mL / OUT: 400 mL / NET: -373 mL    Appearance: Normal  HEENT: Normal JVP  Cardiovascular: Audible S1 S2  Typical LVAD sounds; Driveline site: clean, dry, intact  Respiratory: Lungs clear to auscultation  Psychiatry: A & O x 3, Mood & affect appropriate  Gastrointestinal:  Soft, Non-tender, + BS	  Skin: No rashes, No ecchymoses, No cyanosis  Neurologic: Non-focal  Extremities: No cyanosis, clubbing or edema    LABS:    CBC Full  -  ( 06 Feb 2018 06:52 )  WBC Count : 10.5 K/uL  Hemoglobin : 11.5 g/dL  Hematocrit : 34.7 %  Platelet Count - Automated : 216 K/uL  Mean Cell Volume : 97.2 fl  Mean Cell Hemoglobin : 32.1 pg  Mean Cell Hemoglobin Concentration : 33.1 gm/dL      02-06    137  |  101  |  36<H>  ----------------------------<  106<H>  4.8   |  24  |  1.62<H>  02-05    135  |  100  |  30<H>  ----------------------------<  102<H>  4.7   |  25  |  1.51<H>    Ca    9.3      06 Feb 2018 06:52  Ca    9.4      05 Feb 2018 06:47      TELEMETRY: NSR, occasional PVCs, bigeminy

## 2018-02-06 NOTE — PROGRESS NOTE ADULT - ASSESSMENT
66 yo man with a history of a nonischemic cardiomyopathy admitted with anemia, GI bleeding and being further evaluated for a possible transplant listing     HIV ab and HIV viral load PCR test- both are negative. Patient is HIV -  Please note that he is toxo ab. + and will need Bactrim prophylaxis  He will need vaccination for pneumococcal pneumonia ( had one vaccine at [facility])  He may need a second seasonal influenza vaccine  Needs HBV vaccine series- first dose ordered  HAV vaccine series- first dose oredered  Quantiferon gold testing - was negative    Can give the first dose of HBV and HAV vaccines in the hospital (ordered) and then follow up with ID clinic  Please give a dose of Prevnar prior to discharge    There are no Infectious Disease contra-indications to listing the patient for a heart transplant at his time.    Gary Lujan MD  [phone]  After 5pm/weekends [phone]

## 2018-02-06 NOTE — PROGRESS NOTE ADULT - SUBJECTIVE AND OBJECTIVE BOX
INFECTIOUS DISEASES FOLLOW UP-- Sujey Lujan  [phone]    This is a follow up note for this  67yMale with  Heart replaced by heart assist device      ROS:  CONSTITUTIONAL:  No fever, good appetite  CARDIOVASCULAR:  No chest pain or palpitations  RESPIRATORY:  No dyspnea  GASTROINTESTINAL:  No nausea, vomiting, diarrhea, or abdominal pain  GENITOURINARY:  No dysuria  NEUROLOGIC:  No headache,     Allergies    No Known Allergies    Intolerances        ANTIBIOTICS/RELEVANT:  antimicrobials    immunologic:  hepatitis A Vaccine - Adult (VAQTA) 1 milliLiter(s) IntraMuscular once  hepatitis B Vaccine - Adult (ENGERIX) 1 milliLiter(s) IntraMuscular once    OTHER:  acetaminophen   Tablet 650 milliGRAM(s) Oral every 6 hours PRN  ascorbic acid 500 milliGRAM(s) Oral two times a day  aspirin enteric coated 81 milliGRAM(s) Oral daily  carvedilol 25 milliGRAM(s) Oral every 12 hours  docusate sodium 100 milliGRAM(s) Oral three times a day  eptifibatide Infusion 75 mg/100 mL 0.5 MICROgram(s)/kG/Min IV Continuous <Continuous>  ferrous    sulfate 325 milliGRAM(s) Oral daily  furosemide    Tablet 20 milliGRAM(s) Oral every other day  heparin  Infusion 950 Unit(s)/Hr IV Continuous <Continuous>  hydrALAZINE 10 milliGRAM(s) Oral every 8 hours  hydrocortisone 2.5% Cream 1 Application(s) Topical two times a day  mexiletine 150 milliGRAM(s) Oral two times a day  pantoprazole    Tablet 40 milliGRAM(s) Oral before breakfast  QUEtiapine 50 milliGRAM(s) Oral at bedtime  spironolactone 25 milliGRAM(s) Oral daily  warfarin 4 milliGRAM(s) Oral once      Objective:  Vital Signs Last 24 Hrs  T(C): 36.7 (06 Feb 2018 14:30), Max: 36.9 (05 Feb 2018 18:00)  T(F): 98 (06 Feb 2018 14:30), Max: 98.4 (05 Feb 2018 18:00)  HR: 100 (06 Feb 2018 14:30) (73 - 100)  BP: 103/68 (06 Feb 2018 10:10) (100/81 - 118/84)  BP(mean): 86 (06 Feb 2018 14:30) (80 - 93)  RR: 18 (06 Feb 2018 10:10) (18 - 18)  SpO2: 100% (06 Feb 2018 10:10) (97% - 100%)    PHYSICAL EXAM:  Constitutional:no acute distress  Eyes:WALT, EOMI  Ear/Nose/Throat: no oral lesions, 	  Respiratory: clear BL  Cardiovascular: S1S2  Gastrointestinal:soft, (+) BS, no tenderness  Extremities:no e/e/c  No Lymphadenopathy  IV sites not inflammed.    LABS:                        11.5   10.5  )-----------( 216      ( 06 Feb 2018 06:52 )             34.7     02-06    137  |  101  |  36<H>  ----------------------------<  106<H>  4.8   |  24  |  1.62<H>    Ca    9.3      06 Feb 2018 06:52      PT/INR - ( 06 Feb 2018 06:52 )   PT: 18.8 sec;   INR: 1.72 ratio         PTT - ( 06 Feb 2018 06:52 )  PTT:66.6 sec      MICROBIOLOGY:      HIV-1 RNA Quantitative, Viral Load (02.02.18 @ 19:25)    HIV-1 Viral Load Result: NOT DET.    HIV-1 RNA Quantitative, Viral Load:   NOT DET.    HIV-1 RNA Quantitative, Vir Load Interp: See Comment Viral loads lower than 12 copies/ml cannot be detected reliably. Thus, a  Not Detected result does not necessarily rule out HIV-1 infection.  METHOD: Transcription mediated amplification (TMA) – [email or web address] [place].  Abbreviation:  NOT DET. and not det. = Not Detected  n/a = not available  .    HIV-1 RNA Quantitative, Viral Load Log: NOT DET. lg /mL      HIV-1/2 Antigen/Antibody Screen by [facility] (02.02.18 @ 19:25)    HIV-1/2 Combo Result: Nonreact: The HIV Ag/Ab Combo test performed screens for HIV-1 p24 antigen,  antibodies to HIV-1 (group M and group O), and antibodies to HIV-2. All  specimens repeatedly reactive will reflex to an HIV 1/2 antibody  confirmation and differentiation test. This assay detects p24 antigen  which may be present prior to the development of HIV antibodies,  therefore a reactive result with a negative HIV 1/2 AB Confirmation  should be followed up with HIV-1 RNA, HIV-2 RNA and repeat testing in 4-8  weeks. A nonreactive result does not preclude previous exposure to or  infection with HIV-1 or HIV-2. [place] prohibits disclosure of this  result to any unauthorized party.        RECENT CULTURES:      RADIOLOGY & ADDITIONAL STUDIES:

## 2018-02-06 NOTE — PROGRESS NOTE ADULT - SUBJECTIVE AND OBJECTIVE BOX
Behavioral Cardiology Progress Note     HPI:  Mr. Baez is a 67 year old man with chronic systolic heart failure, ACC/AHA stage D, due to nonischemic cardiomyopathy, s/p LVAD 6/12/17, GIB s/p cautery 7/25/17, known AV malformation, now  chronic anemia numerous GIBs (off AC), Afib, VT s/p AICD, who presented to ED with melena, found to have H/H 6/19.6.  Transfused 2U PRBC.  Team awaiting Capsule endoscopy results.    Behavioral Health Assessment:     Current stressors:   Hospitalization   Adjustment to living with LVAD      Support system/family support: Good support from family and close friend      Coping strategies: Talking with family and staff, watching TV, his pastora, talking to his granddaughter     Understanding of medical illness and treatment plan:  Understands reasons for this admission and treatment plan.  Good understanding of LVAD management; benefiting from ongoing education and review.  Has been able to teach back information on heart transplant (medication and diet).     MSE: Pt seen sitting in chair. A&Ox3.  Well related with good eye contact.  Thought process goal directed.  No abnormal thought content; denies s/i.  Mood upbeat.  Affect full range.  Insight and judgment adequate. Behavioral Cardiology Progress Note     HPI:  Mr. Baez is a 67 year old man with Chronic Systolic Heart Failure (ACC/AHA Stage D) due to NICMP s/p LVAD 6/12/17, GIB s/p Cautery (7/25/2017), known AV Malformations, Chronic Anemia due to numerous GIBs (off AC), A-Fib, VT s/p AICD. Pt admitted due to elevated LDH level of 646 for further evaluation and observation.      Behavioral Health Assessment:     Current stressors:   Hospitalization   Adjustment to living with LVAD      Support system/family support: Good support from family and close friend      Coping strategies: Talking with family and staff, watching TV, his pastora, talking to his granddaughter     Understanding of medical illness and treatment plan:  Understands reasons for this admission and treatment plan.  Good understanding of LVAD management; benefiting from ongoing education and review.  Has been able to teach back information on heart transplant (medication and diet).     MSE: Pt seen sitting in chair. A&Ox3.  Well related with good eye contact.  Thought process goal directed.  No abnormal thought content; denies s/i.  Mood upbeat.  Affect full range.  Insight and judgment adequate.

## 2018-02-06 NOTE — PROGRESS NOTE ADULT - PROBLEM SELECTOR PLAN 1
Continue Amiodarone 200 daily/ coreg and mexitil   anticoagulation with heparin gttp and coumadin  Integrilin gtt initiated

## 2018-02-06 NOTE — PROGRESS NOTE ADULT - ATTENDING COMMENTS
He looks and feels very well, but LDH increased despite addition of ASA last night.  LDH currently 895 (776). MAP 84-94.  INR 1.72. No bridging given prior GIB.  VAD Interrogation without power spikes. No changes made.    Please add Integrilin 1 mcg/kg/min, no bolus, for rising LDH.  Please add hydralazine 10 mg po TID for elevated MAP.  Would reduce warfarin to 4 mg QHS as target is 2.0-2.5.    Listed for OHT, blood type B, UNOS 1A status. No crossmatch needed (cPRA 0%).

## 2018-02-06 NOTE — PROGRESS NOTE ADULT - PROBLEM SELECTOR PLAN 5
Continue hydralazine, amiodarone, lasix, aldactone, mexilitine, coreg as per CHF team   today Continue hydralazine, amiodarone, lasix, aldactone, mexilitine, coreg as per CHF team   MAP goal 70-80

## 2018-02-07 LAB
ANION GAP SERPL CALC-SCNC: 9 MMOL/L — SIGNIFICANT CHANGE UP (ref 5–17)
APTT BLD: 66.8 SEC — HIGH (ref 27.5–37.4)
APTT BLD: 96.4 SEC — HIGH (ref 27.5–37.4)
BUN SERPL-MCNC: 42 MG/DL — HIGH (ref 7–23)
CALCIUM SERPL-MCNC: 9.2 MG/DL — SIGNIFICANT CHANGE UP (ref 8.4–10.5)
CHLORIDE SERPL-SCNC: 101 MMOL/L — SIGNIFICANT CHANGE UP (ref 96–108)
CO2 SERPL-SCNC: 24 MMOL/L — SIGNIFICANT CHANGE UP (ref 22–31)
CREAT SERPL-MCNC: 1.58 MG/DL — HIGH (ref 0.5–1.3)
GLUCOSE SERPL-MCNC: 103 MG/DL — HIGH (ref 70–99)
HCT VFR BLD CALC: 33.9 % — LOW (ref 39–50)
HGB BLD-MCNC: 11.2 G/DL — LOW (ref 13–17)
INR BLD: 2.17 RATIO — HIGH (ref 0.88–1.16)
LDH SERPL L TO P-CCNC: 882 U/L — HIGH (ref 50–242)
LDH SERPL L TO P-CCNC: 963 U/L — HIGH (ref 50–242)
MAGNESIUM SERPL-MCNC: 2.4 MG/DL — SIGNIFICANT CHANGE UP (ref 1.6–2.6)
MCHC RBC-ENTMCNC: 32.1 PG — SIGNIFICANT CHANGE UP (ref 27–34)
MCHC RBC-ENTMCNC: 32.9 GM/DL — SIGNIFICANT CHANGE UP (ref 32–36)
MCV RBC AUTO: 97.7 FL — SIGNIFICANT CHANGE UP (ref 80–100)
PLATELET # BLD AUTO: 222 K/UL — SIGNIFICANT CHANGE UP (ref 150–400)
POTASSIUM SERPL-MCNC: 4.4 MMOL/L — SIGNIFICANT CHANGE UP (ref 3.5–5.3)
POTASSIUM SERPL-SCNC: 4.4 MMOL/L — SIGNIFICANT CHANGE UP (ref 3.5–5.3)
PROTHROM AB SERPL-ACNC: 24 SEC — HIGH (ref 9.8–12.7)
RBC # BLD: 3.47 M/UL — LOW (ref 4.2–5.8)
RBC # FLD: 17.4 % — HIGH (ref 10.3–14.5)
SODIUM SERPL-SCNC: 134 MMOL/L — LOW (ref 135–145)
WBC # BLD: 11.1 K/UL — HIGH (ref 3.8–10.5)
WBC # FLD AUTO: 11.1 K/UL — HIGH (ref 3.8–10.5)

## 2018-02-07 PROCEDURE — 99232 SBSQ HOSP IP/OBS MODERATE 35: CPT

## 2018-02-07 PROCEDURE — 93750 INTERROGATION VAD IN PERSON: CPT

## 2018-02-07 PROCEDURE — 99233 SBSQ HOSP IP/OBS HIGH 50: CPT | Mod: 25

## 2018-02-07 RX ORDER — SENNA PLUS 8.6 MG/1
2 TABLET ORAL AT BEDTIME
Qty: 0 | Refills: 0 | Status: DISCONTINUED | OUTPATIENT
Start: 2018-02-07 | End: 2018-02-23

## 2018-02-07 RX ORDER — HEPARIN SODIUM 5000 [USP'U]/ML
900 INJECTION INTRAVENOUS; SUBCUTANEOUS
Qty: 25000 | Refills: 0 | Status: DISCONTINUED | OUTPATIENT
Start: 2018-02-07 | End: 2018-02-09

## 2018-02-07 RX ORDER — EPTIFIBATIDE 2 MG/ML
0.5 INJECTION, SOLUTION INTRAVENOUS
Qty: 75 | Refills: 0 | Status: DISCONTINUED | OUTPATIENT
Start: 2018-02-07 | End: 2018-02-09

## 2018-02-07 RX ORDER — POLYETHYLENE GLYCOL 3350 17 G/17G
17 POWDER, FOR SOLUTION ORAL DAILY
Qty: 0 | Refills: 0 | Status: DISCONTINUED | OUTPATIENT
Start: 2018-02-07 | End: 2018-02-23

## 2018-02-07 RX ORDER — HEPARIN SODIUM 5000 [USP'U]/ML
900 INJECTION INTRAVENOUS; SUBCUTANEOUS
Qty: 25000 | Refills: 0 | Status: DISCONTINUED | OUTPATIENT
Start: 2018-02-07 | End: 2018-02-07

## 2018-02-07 RX ORDER — HYDRALAZINE HCL 50 MG
25 TABLET ORAL EVERY 8 HOURS
Qty: 0 | Refills: 0 | Status: DISCONTINUED | OUTPATIENT
Start: 2018-02-07 | End: 2018-02-09

## 2018-02-07 RX ORDER — WARFARIN SODIUM 2.5 MG/1
4 TABLET ORAL ONCE
Qty: 0 | Refills: 0 | Status: COMPLETED | OUTPATIENT
Start: 2018-02-07 | End: 2018-02-07

## 2018-02-07 RX ORDER — SORBITOL SOLUTION 70 %
30 SOLUTION, ORAL MISCELLANEOUS ONCE
Qty: 0 | Refills: 0 | Status: COMPLETED | OUTPATIENT
Start: 2018-02-07 | End: 2018-02-07

## 2018-02-07 RX ORDER — PNEUMOCOCCAL 13-VALENT CONJUGATE VACCINE 2.2; 2.2; 2.2; 2.2; 2.2; 4.4; 2.2; 2.2; 2.2; 2.2; 2.2; 2.2; 2.2 UG/.5ML; UG/.5ML; UG/.5ML; UG/.5ML; UG/.5ML; UG/.5ML; UG/.5ML; UG/.5ML; UG/.5ML; UG/.5ML; UG/.5ML; UG/.5ML; UG/.5ML
0.5 INJECTION, SUSPENSION INTRAMUSCULAR ONCE
Qty: 0 | Refills: 0 | Status: DISCONTINUED | OUTPATIENT
Start: 2018-02-07 | End: 2018-02-08

## 2018-02-07 RX ORDER — FUROSEMIDE 40 MG
20 TABLET ORAL DAILY
Qty: 0 | Refills: 0 | Status: DISCONTINUED | OUTPATIENT
Start: 2018-02-07 | End: 2018-02-08

## 2018-02-07 RX ORDER — INFLUENZA VIRUS VACCINE 15; 15; 15; 15 UG/.5ML; UG/.5ML; UG/.5ML; UG/.5ML
0.5 SUSPENSION INTRAMUSCULAR ONCE
Qty: 0 | Refills: 0 | Status: DISCONTINUED | OUTPATIENT
Start: 2018-02-07 | End: 2018-02-08

## 2018-02-07 RX ADMIN — Medication 30 MILLILITER(S): at 14:00

## 2018-02-07 RX ADMIN — Medication 1 APPLICATION(S): at 06:45

## 2018-02-07 RX ADMIN — Medication 500 MILLIGRAM(S): at 17:14

## 2018-02-07 RX ADMIN — Medication 1 MILLILITER(S): at 09:02

## 2018-02-07 RX ADMIN — CARVEDILOL PHOSPHATE 25 MILLIGRAM(S): 80 CAPSULE, EXTENDED RELEASE ORAL at 06:44

## 2018-02-07 RX ADMIN — Medication 81 MILLIGRAM(S): at 11:51

## 2018-02-07 RX ADMIN — HEPARIN SODIUM 9 UNIT(S)/HR: 5000 INJECTION INTRAVENOUS; SUBCUTANEOUS at 23:35

## 2018-02-07 RX ADMIN — SENNA PLUS 2 TABLET(S): 8.6 TABLET ORAL at 22:46

## 2018-02-07 RX ADMIN — Medication 1 APPLICATION(S): at 17:15

## 2018-02-07 RX ADMIN — Medication 100 MILLIGRAM(S): at 22:41

## 2018-02-07 RX ADMIN — HEPARIN SODIUM 9 UNIT(S)/HR: 5000 INJECTION INTRAVENOUS; SUBCUTANEOUS at 16:40

## 2018-02-07 RX ADMIN — Medication 1 MILLILITER(S): at 12:35

## 2018-02-07 RX ADMIN — MEXILETINE HYDROCHLORIDE 150 MILLIGRAM(S): 150 CAPSULE ORAL at 06:44

## 2018-02-07 RX ADMIN — Medication 325 MILLIGRAM(S): at 11:51

## 2018-02-07 RX ADMIN — MEXILETINE HYDROCHLORIDE 150 MILLIGRAM(S): 150 CAPSULE ORAL at 17:15

## 2018-02-07 RX ADMIN — EPTIFIBATIDE 6.22 MICROGRAM(S)/KG/MIN: 2 INJECTION, SOLUTION INTRAVENOUS at 16:01

## 2018-02-07 RX ADMIN — Medication 100 MILLIGRAM(S): at 14:00

## 2018-02-07 RX ADMIN — CARVEDILOL PHOSPHATE 25 MILLIGRAM(S): 80 CAPSULE, EXTENDED RELEASE ORAL at 17:14

## 2018-02-07 RX ADMIN — Medication 10 MILLIGRAM(S): at 06:44

## 2018-02-07 RX ADMIN — Medication 500 MILLIGRAM(S): at 06:47

## 2018-02-07 RX ADMIN — WARFARIN SODIUM 4 MILLIGRAM(S): 2.5 TABLET ORAL at 22:41

## 2018-02-07 RX ADMIN — Medication 100 MILLIGRAM(S): at 06:44

## 2018-02-07 RX ADMIN — Medication 20 MILLIGRAM(S): at 15:50

## 2018-02-07 RX ADMIN — Medication 10 MILLIGRAM(S): at 14:00

## 2018-02-07 RX ADMIN — Medication 25 MILLIGRAM(S): at 22:41

## 2018-02-07 RX ADMIN — SPIRONOLACTONE 25 MILLIGRAM(S): 25 TABLET, FILM COATED ORAL at 06:45

## 2018-02-07 RX ADMIN — PANTOPRAZOLE SODIUM 40 MILLIGRAM(S): 20 TABLET, DELAYED RELEASE ORAL at 06:44

## 2018-02-07 RX ADMIN — QUETIAPINE FUMARATE 50 MILLIGRAM(S): 200 TABLET, FILM COATED ORAL at 22:41

## 2018-02-07 NOTE — PROGRESS NOTE ADULT - ASSESSMENT
67M PMH Chronic Systolic Heart Failure (ACC/AHA Stage D) due to NICMP s/p LVAD 6/12/17, GIB s/p Cautery (7/25/2017), known AV Malformations, Chronic Anemia due to numerous GIBs (off AC), A-Fib, VT s/p AICD. Pt admitted due to elevated LDH level of 646 (previously 466) for further evaluation and observation.   2/2 VSS ; INR 1.3- continue heparin and coumadin   meds as per CHF team  2/3 - monitor LDH twice a day; continue heparin and coumadin - INR 1.5- coumadin 3 mg today; continue meds as per CHF team   renal sono to evaluate left renal cyst   2/4 RAMP echo, no speed adjustments made  2/5 . Transplant evaluation in progress  2/6  Integrilin initiated, hydralazine 10mg tid  2/7 , INR 2.17, Heparin drip off. 67M PMH Chronic Systolic Heart Failure (ACC/AHA Stage D) due to NICMP s/p LVAD 6/12/17, GIB s/p Cautery (7/25/2017), known AV Malformations, Chronic Anemia due to numerous GIBs (off AC), A-Fib, VT s/p AICD. Pt admitted due to elevated LDH level of 646 (previously 466) for further evaluation and observation.   2/2 VSS ; INR 1.3- continue heparin and coumadin   meds as per CHF team  2/3 - monitor LDH twice a day; continue heparin and coumadin - INR 1.5- coumadin 3 mg today; continue meds as per CHF team   renal sono to evaluate left renal cyst   2/4 RAMP echo, no speed adjustments made  2/5 . Transplant evaluation in progress  2/6  Integrilin initiated, hydralazine 10mg tid  2/7 , INR 2.17, continue Heparin drip per HF. Increase integrilin drip to 1mcg, hydralazine to 25q8 and lasix daily.

## 2018-02-07 NOTE — PROGRESS NOTE ADULT - PROBLEM SELECTOR PLAN 2
Continue heparin integrelin aspirin and coumadin   Daily CBC at high risk for bleeding   Daily  LDH Repeat  later today Continue heparin integrelin aspirin and coumadin   Daily CBC at high risk for bleeding   Daily  LDH

## 2018-02-07 NOTE — PROGRESS NOTE ADULT - PROBLEM SELECTOR PLAN 1
Continue Amiodarone 200 daily/ coreg and mexiletine  Continue anticoagulation with heparin gttp and coumadin

## 2018-02-07 NOTE — PROGRESS NOTE ADULT - SUBJECTIVE AND OBJECTIVE BOX
INFECTIOUS DISEASES FOLLOW UP-- Sujey Lujan  [phone]    This is a follow up note for this  67yMale with  Heart replaced by heart assist device      ROS:  CONSTITUTIONAL:  No fever, good appetite  CARDIOVASCULAR:  No chest pain or palpitations  RESPIRATORY:  No dyspnea  GASTROINTESTINAL:  No nausea, vomiting, diarrhea, or abdominal pain  GENITOURINARY:  No dysuria  NEUROLOGIC:  No headache,     Allergies    No Known Allergies    Intolerances        ANTIBIOTICS/RELEVANT:  antimicrobials    immunologic:  influenza  Vaccine (HIGH DOSE) 0.5 milliLiter(s) IntraMuscular once  pneumococcal  13 Vaccine (PREVNAR 13) 0.5 milliLiter(s) IntraMuscular once    OTHER:  acetaminophen   Tablet 650 milliGRAM(s) Oral every 6 hours PRN  ascorbic acid 500 milliGRAM(s) Oral two times a day  aspirin enteric coated 81 milliGRAM(s) Oral daily  carvedilol 25 milliGRAM(s) Oral every 12 hours  docusate sodium 100 milliGRAM(s) Oral three times a day  eptifibatide Infusion 75 mg/100 mL 1 MICROgram(s)/kG/Min IV Continuous <Continuous>  ferrous    sulfate 325 milliGRAM(s) Oral daily  furosemide    Tablet 20 milliGRAM(s) Oral daily  heparin  Infusion 900 Unit(s)/Hr IV Continuous <Continuous>  hydrALAZINE 25 milliGRAM(s) Oral every 8 hours  hydrocortisone 2.5% Cream 1 Application(s) Topical two times a day  mexiletine 150 milliGRAM(s) Oral two times a day  pantoprazole    Tablet 40 milliGRAM(s) Oral before breakfast  polyethylene glycol 3350 17 Gram(s) Oral daily  QUEtiapine 50 milliGRAM(s) Oral at bedtime  senna 2 Tablet(s) Oral at bedtime  spironolactone 25 milliGRAM(s) Oral daily  warfarin 4 milliGRAM(s) Oral once      Objective:  Vital Signs Last 24 Hrs  T(C): 37 (07 Feb 2018 17:20), Max: 37 (06 Feb 2018 20:05)  T(F): 98.6 (07 Feb 2018 17:20), Max: 98.6 (06 Feb 2018 20:05)  HR: 74 (07 Feb 2018 17:20) (71 - 86)  BP: 106/60 (07 Feb 2018 17:20) (102/70 - 118/62)  BP(mean): 75 (07 Feb 2018 17:20) (75 - 80)  RR: 17 (07 Feb 2018 17:20) (17 - 18)  SpO2: 94% (07 Feb 2018 17:20) (94% - 100%)    PHYSICAL EXAM:  Constitutional:no acute distress  Eyes:WALT, EOMI  Ear/Nose/Throat: no oral lesions, 	  Respiratory: clear BL  Cardiovascular: S1S2  Gastrointestinal:soft, (+) BS, no tenderness  Extremities:no e/e/c  No Lymphadenopathy  IV sites not inflammed.    LABS:                        11.2   11.1  )-----------( 222      ( 07 Feb 2018 07:14 )             33.9     02-07    134<L>  |  101  |  42<H>  ----------------------------<  103<H>  4.4   |  24  |  1.58<H>    Ca    9.2      07 Feb 2018 07:14  Mg     2.4     02-07      PT/INR - ( 07 Feb 2018 07:14 )   PT: 24.0 sec;   INR: 2.17 ratio         PTT - ( 07 Feb 2018 07:14 )  PTT:96.4 sec      MICROBIOLOGY:            RECENT CULTURES:      RADIOLOGY & ADDITIONAL STUDIES:      < from: Xray Chest 1 View AP/PA (02.02.18 @ 05:48) >    IMPRESSION:    Probable mild left basilar atelectasis. The lungs are otherwise clear.    < end of copied text >

## 2018-02-07 NOTE — PROGRESS NOTE ADULT - ATTENDING COMMENTS
He continues to feel well, but LDH still rising. Started Integrilin yesterday and LDH seems to have plateaued.  Dose was originally 1 mcg/kg/min, but was reduced to 0.5 for unclear reasons.  He received his Hep A and Hep B initial vaccinations.    . MAP 76-86. INR 2.17.  VAD Interrogation without power spikes or events. No changes made.    Please increase Integrilin to 1 mcg/kg/min, no bolus, for suspected pump thrombosis.  Please increase hydralazine to 25 mg po TID for slightly elevated MAP.  Increase lasix frequency to daily as he is getting more fluid with the IV meds (20 mg po daily).  Continue warfarin 4 mg QHS as target is 2.0-2.5. Would keep heparin for another day to make sure that LDH declines.  Per Dr. Lujan (Transplant ID), please give flu and pneumonia vax (repeat).  Listed for OHT, blood type B, UNOS 1A status. No crossmatch needed (cPRA 0%).

## 2018-02-07 NOTE — PROGRESS NOTE ADULT - ASSESSMENT
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 He was resumed on coumadin but remained subtherapeutic and due to inability to use Lovenox given his history of GIBs he was admitted  and started on a heparin gtt and coumadin.   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 He was resumed on coumadin but remained subtherapeutic and due to inability to use Lovenox given his history of GIBs he was admitted  and started on a heparin gtt ? coumadin. with concern LVAD pump thrombosis . Pt LDH continued to increase . A Echo ramp study was concerning for pump thrombosis , Pt at present   heparin gtt  Coumadin (4)(6)  ASA 81 integrilin gtt started -.     down from 895 , INR 2.1,  Map 76 - 86 . Pt appears comfortable  with no events  of bleeding PI events

## 2018-02-07 NOTE — PROGRESS NOTE ADULT - SUBJECTIVE AND OBJECTIVE BOX
Subjective:    Medications:  acetaminophen   Tablet 650 milliGRAM(s) Oral every 6 hours PRN  ascorbic acid 500 milliGRAM(s) Oral two times a day  aspirin enteric coated 81 milliGRAM(s) Oral daily  carvedilol 25 milliGRAM(s) Oral every 12 hours  docusate sodium 100 milliGRAM(s) Oral three times a day  eptifibatide Infusion 75 mg/100 mL 0.5 MICROgram(s)/kG/Min IV Continuous <Continuous>  ferrous    sulfate 325 milliGRAM(s) Oral daily  furosemide    Tablet 20 milliGRAM(s) Oral every other day  heparin  Infusion 950 Unit(s)/Hr IV Continuous <Continuous>  hepatitis A (virus) Vaccine - Adult (HAVRIX) 1 milliLiter(s) IntraMuscular once  hepatitis B Vaccine - Adult (ENGERIX) 1 milliLiter(s) IntraMuscular once  hydrALAZINE 10 milliGRAM(s) Oral every 8 hours  hydrocortisone 2.5% Cream 1 Application(s) Topical two times a day  mexiletine 150 milliGRAM(s) Oral two times a day  pantoprazole    Tablet 40 milliGRAM(s) Oral before breakfast  QUEtiapine 50 milliGRAM(s) Oral at bedtime  spironolactone 25 milliGRAM(s) Oral daily      PHYSICAL EXAM:  Vital Signs Last 24 Hrs  T(C): 36.3 (07 Feb 2018 06:37), Max: 37 (06 Feb 2018 20:05)  T(F): 97.3 (07 Feb 2018 06:37), Max: 98.6 (06 Feb 2018 20:05)  HR: 71 (07 Feb 2018 06:37) (71 - 100)  BP: 109/82 (07 Feb 2018 06:37) (103/68 - 118/62)  BP(mean): 76 (06 Feb 2018 18:36) (76 - 86)  RR: 18 (07 Feb 2018 06:37) (18 - 18)  SpO2: 99% (07 Feb 2018 06:37) (98% - 100%)     I&O's Detail    06 Feb 2018 07:01  -  07 Feb 2018 07:00  --------------------------------------------------------  IN:    eptifibatide Infusion 75 mg/100 mL: 2.6 mL    eptifibatide Infusion 75 mg/100 mL: 43.4 mL    heparin Infusion: 36 mL    heparin Infusion: 132 mL    Oral Fluid: 1080 mL  Total IN: 1294 mL    OUT:    Voided: 1600 mL  Total OUT: 1600 mL    Total NET: -306 mL          General: A/ox 3, No acute Distress  Neck: Supple, NO JVD  Cardiac: S1 S2, No M/R/G  Pulmonary: CTAB, Breathing unlabored, No Rhonchi/Rales/Wheezing  Abdomen: Soft, Non -tender, +BS   Extremities: No Rashes, No edema  Neuro: A/o x 3, No focal deficits  Psch: normal mood , normal affect    LABS:                          11.2   11.1  )-----------( 222      ( 07 Feb 2018 07:14 )             33.9     02-07    134<L>  |  101  |  42<H>  ----------------------------<  103<H>  4.4   |  24  |  1.58<H>    Creatinine, Serum: 1.62 mg/dL (02.06.18 @ 06:52)        Ca    9.2      07 Feb 2018 07:14  Mg     2.4     02-07    Lactate Dehydrogenase, Serum: 882 U/L (02.07.18 @ 07:14)  Lactate Dehydrogenase, Serum: 895 U/L (02.06.18 @ 06:52)  Lactate Dehydrogenase, Serum: 776 U/L (02.05.18 @ 06:47)      PT/INR - ( 07 Feb 2018 07:14 )   PT: 24.0 sec;   INR: 2.17 ratio         PTT - ( 07 Feb 2018 07:14 )  PTT:96.4 sec Subjective: Pt sitting in bed feeling well No LH/dizziness , reports     Medications:  acetaminophen   Tablet 650 milliGRAM(s) Oral every 6 hours PRN  ascorbic acid 500 milliGRAM(s) Oral two times a day  aspirin enteric coated 81 milliGRAM(s) Oral daily  carvedilol 25 milliGRAM(s) Oral every 12 hours  docusate sodium 100 milliGRAM(s) Oral three times a day  eptifibatide Infusion 75 mg/100 mL 0.5 MICROgram(s)/kG/Min IV Continuous <Continuous>  ferrous    sulfate 325 milliGRAM(s) Oral daily  furosemide    Tablet 20 milliGRAM(s) Oral every other day  heparin  Infusion 950 Unit(s)/Hr IV Continuous <Continuous>  hepatitis A (virus) Vaccine - Adult (HAVRIX) 1 milliLiter(s) IntraMuscular once  hepatitis B Vaccine - Adult (ENGERIX) 1 milliLiter(s) IntraMuscular once  hydrALAZINE 10 milliGRAM(s) Oral every 8 hours  hydrocortisone 2.5% Cream 1 Application(s) Topical two times a day  mexiletine 150 milliGRAM(s) Oral two times a day  pantoprazole    Tablet 40 milliGRAM(s) Oral before breakfast  QUEtiapine 50 milliGRAM(s) Oral at bedtime  spironolactone 25 milliGRAM(s) Oral daily      PHYSICAL EXAM:  Vital Signs Last 24 Hrs  T(C): 36.3 (07 Feb 2018 06:37), Max: 37 (06 Feb 2018 20:05)  T(F): 97.3 (07 Feb 2018 06:37), Max: 98.6 (06 Feb 2018 20:05)  HR: 71 (07 Feb 2018 06:37) (71 - 100)  BP: 109/82 (07 Feb 2018 06:37) (103/68 - 118/62)  BP(mean): 76 (06 Feb 2018 18:36) (76 - 86)  RR: 18 (07 Feb 2018 06:37) (18 - 18)  SpO2: 99% (07 Feb 2018 06:37) (98% - 100%)     Tele: SR first degree 70 -90 7 beat wct 2/7   13 beats 2/6     06 Feb 2018 07:01  -  07 Feb 2018 07:00  --------------------------------------------------------  IN:    eptifibatide Infusion 75 mg/100 mL: 2.6 mL    eptifibatide Infusion 75 mg/100 mL: 43.4 mL    heparin Infusion: 36 mL    heparin Infusion: 132 mL    Oral Fluid: 1080 mL  Total IN: 1294 mL    OUT:    Voided: 1600 mL  Total OUT: 1600 mL    Total NET: -306 mL      General: A/ox 3, No acute Distress  Neck: Supple, JVD 12   Cardiac: S1 S2, LVAD HUM  Driveline site clean and dry   Pulmonary: CTAB, Breathing unlabored, No Rhonchi/Rales/Wheezing  Abdomen: Soft, Non -tender, +BS   Extremities: No Rashes, No edema  Neuro: A/o x 3, No focal deficits  Psch: normal mood , normal affect    LVAD Interrogation:  2  RPMs: 9200  Flow: 5  Power: 5.5  PI: 7   Event: Scant PI events  No programming changes were made                            11.2   11.1  )-----------( 222      ( 07 Feb 2018 07:14 )             33.9     02-07    134<L>  |  101  |  42<H>  ----------------------------<  103<H>  4.4   |  24  |  1.58<H>    Creatinine, Serum: 1.62 mg/dL (02.06.18 @ 06:52)  Creatinine, Serum: 1.51 mg/dL (02.05.18 @ 06:47)            Ca    9.2      07 Feb 2018 07:14  Mg     2.4     02-07    Lactate Dehydrogenase, Serum: 882 U/L (02.07.18 @ 07:14)  Lactate Dehydrogenase, Serum: 895 U/L (02.06.18 @ 06:52)  Lactate Dehydrogenase, Serum: 776 U/L (02.05.18 @ 06:47)      PT/INR - ( 07 Feb 2018 07:14 )   PT: 24.0 sec;   INR: 2.17 ratio         PTT - ( 07 Feb 2018 07:14 )  PTT:96.4 sec

## 2018-02-07 NOTE — PROGRESS NOTE ADULT - ASSESSMENT
66 yo man with a history of a nonischemic cardiomyopathy admitted with anemia, GI bleeding and being further evaluated for a possible transplant listing     HIV ab and HIV viral load PCR test- both are negative. Patient is HIV -  Please note that he is toxo ab. + and will need Bactrim prophylaxis  He will need vaccination for pneumococcal pneumonia ( had one vaccine at [facility])  He may need a second seasonal influenza vaccine  Needs HBV vaccine series- first dose ordered  HAV vaccine series- first dose oredered  Quantiferon gold testing - was negative    Received the first dose of HBV and HAV vaccines in the hospital and then follow up with ID clinic for the remaining doses  Prevnar and Influenza vaccinations scheduled to be given tonight    There are no Infectious Disease contra-indications to listing the patient for a heart transplant at his time.    Gary Lujan MD  [phone]  After 5pm/weekends [phone]

## 2018-02-07 NOTE — PROGRESS NOTE ADULT - PROBLEM SELECTOR PLAN 1
Continue Coreg 25 BID   Hydralazine  TID   Mexiletine 150mg BID   Transplant Listed for OHT, blood type B, UNOS 1A status. No crossmatch needed (cPRA 0%).   ongoing and support given   ID consult appreciated receiving vaccinations   Lasix every other day Continue Coreg 25 BID    Increase  to Hydralazine  25 TID   Mexiletine 150mg BID   Coumadin  4 mg po tonight   Transplant Listed for OHT, blood type B, UNOS 1A status. No crossmatch needed (cPRA 0%).   ongoing and support given   ID consult appreciated receiving vaccinations   Increase Lasix to 20 mg daily   Id consult appreciated FLU / PNA vaccinations  this admission  Daily standing weights   Daily metabolic  reviewed with Team

## 2018-02-07 NOTE — PROGRESS NOTE ADULT - SUBJECTIVE AND OBJECTIVE BOX
Subjective: Pt states "I'm fine" denies any CP, SOB, N/V and palpitations. No acute events overnight.     Telemetry: SR 1st 70 - 80   Vital Signs Last 24 Hrs  T(F): 98.1 (18 @ 09:12), Max: 98.6 (18 @ 20:05)  HR: 74 (18 @ 09:12) (71 - 100)  BP: 102/70 (18 @ 09:12) (102/70 - 118/62)  RR: 17 (18 @ 09:12) (17 - 18)  SpO2: 94% (18 @ 09:12) (94% - 100%)           LVAD HM II  Speed 9200rpm  Flow 4.5  Power 5.5  PI 5.7    02 @ 07:01  -   @ 07:00  --------------------------------------------------------  IN: 1294 mL / OUT: 1600 mL / NET: -306 mL    Daily Weight in k.8 (2018 08:27)    PHYSICAL EXAM  Neurology: A&Ox3, nonfocal, no gross deficits  CV : RRR+S1S2  Lungs: Respirations non-labored, B/L BS CTA  Abdomen: Soft, NT/ND, +BSx4Q, +BM (-)N/V/D, +Driveline w/DSD CDI  : Voiding without difficulty  Extremities: B/L LE no edema, negative calf tenderness, +PP          MEDICATIONS  acetaminophen   Tablet 650 milliGRAM(s) Oral every 6 hours PRN  ascorbic acid 500 milliGRAM(s) Oral two times a day  aspirin enteric coated 81 milliGRAM(s) Oral daily  carvedilol 25 milliGRAM(s) Oral every 12 hours  docusate sodium 100 milliGRAM(s) Oral three times a day  eptifibatide Infusion 75 mg/100 mL 0.5 MICROgram(s)/kG/Min IV Continuous <Continuous>  ferrous    sulfate 325 milliGRAM(s) Oral daily  furosemide    Tablet 20 milliGRAM(s) Oral every other day  hydrALAZINE 10 milliGRAM(s) Oral every 8 hours  hydrocortisone 2.5% Cream 1 Application(s) Topical two times a day  mexiletine 150 milliGRAM(s) Oral two times a day  pantoprazole    Tablet 40 milliGRAM(s) Oral before breakfast  QUEtiapine 50 milliGRAM(s) Oral at bedtime  spironolactone 25 milliGRAM(s) Oral daily  warfarin 4 milliGRAM(s) Oral once      Physical Therapy Rec:   Home  [  ]   Home w/ PT  [ X ]  Rehab  [  ]    Discussed with Cardiothoracic Team at AM rounds.

## 2018-02-07 NOTE — PROGRESS NOTE ADULT - PROBLEM SELECTOR PLAN 2
Check daily CBC and BID LDH levels  Continue Integrilin gtt at 0.5mcg/kg/min Check daily CBC and BID LDH levels  Continue Integrilin gtt at 1.0mcg/kg/min

## 2018-02-07 NOTE — PROGRESS NOTE ADULT - PROBLEM SELECTOR PLAN 3
Continue Coreg 25 BID   Hydralazine  TID   Mexiletine 150mg BID   Transplant Listed for OHT, blood type B, UNOS 1A status. No crossmatch needed (cPRA 0%).    Lasix every other day.   MAP goal 70 -80 Continue Coreg 25 BID   Hydralazine  TID   Mexiletine 150mg BID   Transplant Listed for OHT, blood type B, UNOS 1A status. No crossmatch needed (cPRA 0%).    furosemide 20 daily. Check daily BMP. supplement k prn.  MAP goal 70 -80

## 2018-02-08 ENCOUNTER — APPOINTMENT (OUTPATIENT)
Dept: CARDIOLOGY | Facility: CLINIC | Age: 68
End: 2018-02-08

## 2018-02-08 LAB
ANION GAP SERPL CALC-SCNC: 10 MMOL/L — SIGNIFICANT CHANGE UP (ref 5–17)
APPEARANCE UR: CLEAR — SIGNIFICANT CHANGE UP
APTT BLD: 74.8 SEC — HIGH (ref 27.5–37.4)
BILIRUB UR-MCNC: NEGATIVE — SIGNIFICANT CHANGE UP
BUN SERPL-MCNC: 62 MG/DL — HIGH (ref 7–23)
CALCIUM SERPL-MCNC: 8.8 MG/DL — SIGNIFICANT CHANGE UP (ref 8.4–10.5)
CHLORIDE SERPL-SCNC: 101 MMOL/L — SIGNIFICANT CHANGE UP (ref 96–108)
CO2 SERPL-SCNC: 23 MMOL/L — SIGNIFICANT CHANGE UP (ref 22–31)
COLOR SPEC: YELLOW — SIGNIFICANT CHANGE UP
CREAT SERPL-MCNC: 1.66 MG/DL — HIGH (ref 0.5–1.3)
DIFF PNL FLD: NEGATIVE — SIGNIFICANT CHANGE UP
GLUCOSE SERPL-MCNC: 120 MG/DL — HIGH (ref 70–99)
GLUCOSE UR QL: NEGATIVE — SIGNIFICANT CHANGE UP
HCT VFR BLD CALC: 26.3 % — LOW (ref 39–50)
HCT VFR BLD CALC: 27.3 % — LOW (ref 39–50)
HGB BLD-MCNC: 8.5 G/DL — LOW (ref 13–17)
HGB BLD-MCNC: 9 G/DL — LOW (ref 13–17)
INR BLD: 2.18 RATIO — HIGH (ref 0.88–1.16)
KETONES UR-MCNC: NEGATIVE — SIGNIFICANT CHANGE UP
LDH SERPL L TO P-CCNC: 794 U/L — HIGH (ref 50–242)
LEUKOCYTE ESTERASE UR-ACNC: NEGATIVE — SIGNIFICANT CHANGE UP
MCHC RBC-ENTMCNC: 31.5 PG — SIGNIFICANT CHANGE UP (ref 27–34)
MCHC RBC-ENTMCNC: 32.2 GM/DL — SIGNIFICANT CHANGE UP (ref 32–36)
MCHC RBC-ENTMCNC: 32.2 PG — SIGNIFICANT CHANGE UP (ref 27–34)
MCHC RBC-ENTMCNC: 33 GM/DL — SIGNIFICANT CHANGE UP (ref 32–36)
MCV RBC AUTO: 97.3 FL — SIGNIFICANT CHANGE UP (ref 80–100)
MCV RBC AUTO: 97.9 FL — SIGNIFICANT CHANGE UP (ref 80–100)
NITRITE UR-MCNC: NEGATIVE — SIGNIFICANT CHANGE UP
PH UR: 6 — SIGNIFICANT CHANGE UP (ref 5–8)
PLATELET # BLD AUTO: 207 K/UL — SIGNIFICANT CHANGE UP (ref 150–400)
PLATELET # BLD AUTO: 208 K/UL — SIGNIFICANT CHANGE UP (ref 150–400)
POTASSIUM SERPL-MCNC: 5.2 MMOL/L — SIGNIFICANT CHANGE UP (ref 3.5–5.3)
POTASSIUM SERPL-SCNC: 5.2 MMOL/L — SIGNIFICANT CHANGE UP (ref 3.5–5.3)
PROT UR-MCNC: NEGATIVE — SIGNIFICANT CHANGE UP
PROTHROM AB SERPL-ACNC: 23.9 SEC — HIGH (ref 9.8–12.7)
RBC # BLD: 2.69 M/UL — LOW (ref 4.2–5.8)
RBC # BLD: 2.81 M/UL — LOW (ref 4.2–5.8)
RBC # FLD: 17.4 % — HIGH (ref 10.3–14.5)
RBC # FLD: 17.7 % — HIGH (ref 10.3–14.5)
SODIUM SERPL-SCNC: 134 MMOL/L — LOW (ref 135–145)
SP GR SPEC: 1.02 — SIGNIFICANT CHANGE UP (ref 1.01–1.02)
UROBILINOGEN FLD QL: NEGATIVE — SIGNIFICANT CHANGE UP
WBC # BLD: 13.5 K/UL — HIGH (ref 3.8–10.5)
WBC # BLD: 16.8 K/UL — HIGH (ref 3.8–10.5)
WBC # FLD AUTO: 13.5 K/UL — HIGH (ref 3.8–10.5)
WBC # FLD AUTO: 16.8 K/UL — HIGH (ref 3.8–10.5)

## 2018-02-08 PROCEDURE — 99233 SBSQ HOSP IP/OBS HIGH 50: CPT | Mod: 25,GC

## 2018-02-08 PROCEDURE — 99232 SBSQ HOSP IP/OBS MODERATE 35: CPT

## 2018-02-08 PROCEDURE — 93750 INTERROGATION VAD IN PERSON: CPT

## 2018-02-08 PROCEDURE — 90834 PSYTX W PT 45 MINUTES: CPT

## 2018-02-08 RX ORDER — POLYETHYLENE GLYCOL 3350 17 G/17G
17 POWDER, FOR SOLUTION ORAL DAILY
Qty: 0 | Refills: 0 | Status: DISCONTINUED | OUTPATIENT
Start: 2018-02-08 | End: 2018-02-09

## 2018-02-08 RX ADMIN — QUETIAPINE FUMARATE 50 MILLIGRAM(S): 200 TABLET, FILM COATED ORAL at 21:20

## 2018-02-08 RX ADMIN — Medication 25 MILLIGRAM(S): at 14:09

## 2018-02-08 RX ADMIN — PANTOPRAZOLE SODIUM 40 MILLIGRAM(S): 20 TABLET, DELAYED RELEASE ORAL at 06:57

## 2018-02-08 RX ADMIN — MEXILETINE HYDROCHLORIDE 150 MILLIGRAM(S): 150 CAPSULE ORAL at 21:22

## 2018-02-08 RX ADMIN — EPTIFIBATIDE 3.11 MICROGRAM(S)/KG/MIN: 2 INJECTION, SOLUTION INTRAVENOUS at 18:00

## 2018-02-08 RX ADMIN — Medication 1 APPLICATION(S): at 06:59

## 2018-02-08 RX ADMIN — POLYETHYLENE GLYCOL 3350 17 GRAM(S): 17 POWDER, FOR SOLUTION ORAL at 21:20

## 2018-02-08 RX ADMIN — SENNA PLUS 2 TABLET(S): 8.6 TABLET ORAL at 21:20

## 2018-02-08 RX ADMIN — HEPARIN SODIUM 9 UNIT(S)/HR: 5000 INJECTION INTRAVENOUS; SUBCUTANEOUS at 06:27

## 2018-02-08 RX ADMIN — Medication 100 MILLIGRAM(S): at 21:20

## 2018-02-08 RX ADMIN — Medication 325 MILLIGRAM(S): at 14:08

## 2018-02-08 RX ADMIN — CARVEDILOL PHOSPHATE 25 MILLIGRAM(S): 80 CAPSULE, EXTENDED RELEASE ORAL at 19:02

## 2018-02-08 RX ADMIN — Medication 81 MILLIGRAM(S): at 14:08

## 2018-02-08 RX ADMIN — Medication 25 MILLIGRAM(S): at 06:57

## 2018-02-08 RX ADMIN — Medication 20 MILLIGRAM(S): at 06:57

## 2018-02-08 RX ADMIN — CARVEDILOL PHOSPHATE 25 MILLIGRAM(S): 80 CAPSULE, EXTENDED RELEASE ORAL at 06:57

## 2018-02-08 RX ADMIN — SPIRONOLACTONE 25 MILLIGRAM(S): 25 TABLET, FILM COATED ORAL at 06:57

## 2018-02-08 RX ADMIN — MEXILETINE HYDROCHLORIDE 150 MILLIGRAM(S): 150 CAPSULE ORAL at 06:57

## 2018-02-08 RX ADMIN — Medication 100 MILLIGRAM(S): at 06:57

## 2018-02-08 RX ADMIN — Medication 100 MILLIGRAM(S): at 14:09

## 2018-02-08 RX ADMIN — Medication 500 MILLIGRAM(S): at 19:02

## 2018-02-08 RX ADMIN — Medication 1 APPLICATION(S): at 21:23

## 2018-02-08 RX ADMIN — Medication 500 MILLIGRAM(S): at 06:57

## 2018-02-08 RX ADMIN — Medication 25 MILLIGRAM(S): at 21:20

## 2018-02-08 NOTE — PROGRESS NOTE ADULT - PROBLEM SELECTOR PLAN 3
Continue Coreg 25 BID   Hydralazine  TID   Mexiletine 150mg BID   Transplant Listed for OHT, blood type B, UNOS 1A status. No crossmatch needed (cPRA 0%).    Check daily BMP. supplement k prn.  MAP goal 70 -80

## 2018-02-08 NOTE — PROGRESS NOTE ADULT - SUBJECTIVE AND OBJECTIVE BOX
Behavioral Cardiology Progress Note     HPI:  Mr. Baez is a 67 year old man with Chronic Systolic Heart Failure (ACC/AHA Stage D) due to NICMP s/p LVAD 6/12/17, GIB s/p Cautery (7/25/2017), known AV Malformations, Chronic Anemia due to numerous GIBs (off AC), A-Fib, VT s/p AICD. Pt admitted due to elevated LDH level of 646 for further evaluation and observation.      Behavioral Health Assessment:     Current stressors:   Hospitalization   Adjustment to living with LVAD      Support system/family support: Good support from family and close friend      Coping strategies: Talking with family and staff, watching TV, his pastora, talking to his granddaughter     Understanding of medical illness and treatment plan:  Understands reasons for this admission and treatment plan.  Good understanding of LVAD management; benefiting from ongoing education and review.  Has been able to teach back information on heart transplant (medication, diet, need for close follow-up).     MSE: Pt seen sitting in chair. A&Ox3.  Well related with good eye contact.  Thought process goal directed.  No abnormal thought content; denies s/i.  Mood upbeat.  Affect full range.  Insight and judgment adequate.

## 2018-02-08 NOTE — PROGRESS NOTE ADULT - SUBJECTIVE AND OBJECTIVE BOX
Subjective hello ambulating no acute distress    VITAL SIGNS    Telemetry: [id number] 80-90     Vital Signs Last 24 Hrs  T(C): 36.5 (18 @ 08:26), Max: 37 (18 @ 17:20)  T(F): 97.7 (18 @ 08:26), Max: 98.6 (18 @ 17:20)  HR: 80 (18 @ 08:26) (74 - 90)  BP: 83/63 (18 @ 08:26) (83/63 - 108/66)  RR: 18 (18 @ 08:26) (17 - 18)  SpO2: 95% (18 @ 08:26) (94% - 99%)          @ 07:01  -   @ 07:00  --------------------------------------------------------  IN: 1258.4 mL / OUT: 1900 mL / NET: -641.6 mL     @ 07:01  -   @ 13:13  --------------------------------------------------------  IN: 480 mL / OUT: 300 mL / NET: 180 mL  LVAD   [id number]  flow 4.9  power5.5  pi 5.7    Daily Weight in k.9   MEDICATIONS  (STANDING):  ascorbic acid 500 milliGRAM(s) Oral two times a day  aspirin enteric coated 81 milliGRAM(s) Oral daily  carvedilol 25 milliGRAM(s) Oral every 12 hours  docusate sodium 100 milliGRAM(s) Oral three times a day  eptifibatide Infusion 75 mg/100 mL 1 MICROgram(s)/kG/Min (6.216 mL/Hr) IV Continuous <Continuous>  ferrous    sulfate 325 milliGRAM(s) Oral daily  furosemide    Tablet 20 milliGRAM(s) Oral daily  heparin  Infusion 900 Unit(s)/Hr (9 mL/Hr) IV Continuous <Continuous>  hydrALAZINE 25 milliGRAM(s) Oral every 8 hours  hydrocortisone 2.5% Cream 1 Application(s) Topical two times a day  influenza  Vaccine (HIGH DOSE) 0.5 milliLiter(s) IntraMuscular once  mexiletine 150 milliGRAM(s) Oral two times a day  pantoprazole    Tablet 40 milliGRAM(s) Oral before breakfast  pneumococcal  13 Vaccine (PREVNAR 13) 0.5 milliLiter(s) IntraMuscular once  polyethylene glycol 3350 17 Gram(s) Oral daily  QUEtiapine 50 milliGRAM(s) Oral at bedtime  senna 2 Tablet(s) Oral at bedtime  spironolactone 25 milliGRAM(s) Oral daily    MEDICATIONS  (PRN):  acetaminophen   Tablet 650 milliGRAM(s) Oral every 6 hours PRN For Temp over 38.3 C (100.94 F)    Coumadin    [ ] YES          [  ]      NO         Reason: HELD        PHYSICAL EXAM  Neurology: alert and oriented x 3, nonfocal, no gross deficits  CV :LVAD sounds   drive line CDI anchor inplace  Lungs:CTA  Abdomen: soft, nontender, nondistended, positive bowel sounds, last bowel movement   : voids            Extremities:    B/L lE warm well perfused     Physical Therapy Rec:   Home  [x]      Discussed with Cardiothoracic Team at AM rounds.

## 2018-02-08 NOTE — PROGRESS NOTE ADULT - ASSESSMENT
Reports he is feeling well today. Mood upbeat. Excited he was listed for heart transplant. Able to teach back information on some aspects of heart transplant education: need for immunosuppressant medication for life and importance of taking exactly as prescribed. With some information needs cues to remember (diet restrictions, need for close follow-up). Has good support from his brother and a close friend who have provided ongoing support and assistance since LVAD implant.  Sleeping well at night. Appetite good. Coping well with hospitalization. Since LVAD has been more willing to ask others for help and support. Receptive to support and validation.        DX:  Systolic heart failure; r/o Adjustment disorder     Recommendations:   Benefits from ongoing education with teach back of heart transplant education   Behavioral Cardiology will continue to follow

## 2018-02-08 NOTE — PROGRESS NOTE ADULT - ASSESSMENT
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 He was resumed on coumadin but remained subtherapeutic and due to inability to use Lovenox given his history of GIBs he was admitted  and started on a heparin gtt ? coumadin. with concern LVAD pump thrombosis . Pt LDH downtrending , Pt at present   heparin gtt  Coumadin,  ASA 81 integrilin gtt .     down from 963 , INR 2.1,  Map 69-90 . Pt appears comfortable  with no events  of bleeding PI events. Mild bump in Cr without volume overload

## 2018-02-08 NOTE — PROGRESS NOTE ADULT - SUBJECTIVE AND OBJECTIVE BOX
24H hour events: Mild rise in Cr to 1.66 from 1.43 with H/h drop from 11.2 to 9    Denies: HA/palpitations/CP/SOB/PND/orthopnea/LE edema    LVAD Interrogation:   RPMs: 9200  Flow: 4.9-5.1  Power: 5.5 - 5.4  PI: 5.7 - 7.5  Event: no events  No programming changes were made    MAP goal 70-80  INR goal:  current anticoagulation:    There is no evidence of LVAD malfunction, the MAPs are ___ goal, and continues on (anticoagulation) while INR remains ____therapeutic.        MEDICATIONS:  aspirin enteric coated 81 milliGRAM(s) Oral daily  carvedilol 25 milliGRAM(s) Oral every 12 hours  eptifibatide Infusion 75 mg/100 mL 1 MICROgram(s)/kG/Min IV Continuous <Continuous>  heparin  Infusion 900 Unit(s)/Hr IV Continuous <Continuous>  hydrALAZINE 25 milliGRAM(s) Oral every 8 hours  mexiletine 150 milliGRAM(s) Oral two times a day        acetaminophen   Tablet 650 milliGRAM(s) Oral every 6 hours PRN  QUEtiapine 50 milliGRAM(s) Oral at bedtime    docusate sodium 100 milliGRAM(s) Oral three times a day  pantoprazole    Tablet 40 milliGRAM(s) Oral before breakfast  polyethylene glycol 3350 17 Gram(s) Oral daily  senna 2 Tablet(s) Oral at bedtime      ascorbic acid 500 milliGRAM(s) Oral two times a day  ferrous    sulfate 325 milliGRAM(s) Oral daily  hydrocortisone 2.5% Cream 1 Application(s) Topical two times a day  influenza  Vaccine (HIGH DOSE) 0.5 milliLiter(s) IntraMuscular once  pneumococcal  13 Vaccine (PREVNAR 13) 0.5 milliLiter(s) IntraMuscular once        PHYSICAL EXAM:  T(C): 36.5 (02-08-18 @ 08:26), Max: 37 (02-07-18 @ 17:20)  HR: 80 (02-08-18 @ 08:26) (74 - 90)  BP: 83/63 (02-08-18 @ 08:26) (83/63 - 108/66)  RR: 18 (02-08-18 @ 08:26) (17 - 18)  SpO2: 95% (02-08-18 @ 08:26) (94% - 99%)  Wt(kg): --  I&O's Summary    07 Feb 2018 07:01  -  08 Feb 2018 07:00  --------------------------------------------------------  IN: 1258.4 mL / OUT: 1900 mL / NET: -641.6 mL    08 Feb 2018 07:01  -  08 Feb 2018 13:28  --------------------------------------------------------  IN: 480 mL / OUT: 300 mL / NET: 180 mL      General: A/ox 3, No acute Distress  Neck: Supple, JVD 12   Cardiac: S1 S2, LVAD HUM  Driveline site clean and dry   Pulmonary: CTAB, Breathing unlabored, No Rhonchi/Rales/Wheezing  Abdomen: Soft, Non -tender, +BS   Extremities: No Rashes, No edema  Neuro: A/o x 3, No focal deficits  Psch: normal mood , normal affect      LABS:    CBC Full  -  ( 08 Feb 2018 05:29 )  WBC Count : 13.5 K/uL  Hemoglobin : 9.0 g/dL  Hematocrit : 27.3 %  Platelet Count - Automated : 208 K/uL  Mean Cell Volume : 97.3 fl  Mean Cell Hemoglobin : 32.2 pg  Mean Cell Hemoglobin Concentration : 33.0 gm/dL  Auto Neutrophil # : x  Auto Lymphocyte # : x  Auto Monocyte # : x  Auto Eosinophil # : x  Auto Basophil # : x  Auto Neutrophil % : x  Auto Lymphocyte % : x  Auto Monocyte % : x  Auto Eosinophil % : x  Auto Basophil % : x    02-08    134<L>  |  101  |  62<H>  ----------------------------<  120<H>  5.2   |  23  |  1.66<H>  02-07    134<L>  |  101  |  42<H>  ----------------------------<  103<H>  4.4   |  24  |  1.58<H>    Ca    8.8      08 Feb 2018 05:28  Ca    9.2      07 Feb 2018 07:14  Mg     2.4     02-07        proBNP:   HgA1c:   TSH:     CARDIAC MARKERS:            TELEMETRY:   ECG:   RADIOLOGY:     PREVIOUS DIAGNOSTIC TESTING:    [ ] Echocardiogram:  [ ] Catheterization:  [ ] Stress Test:

## 2018-02-08 NOTE — PROGRESS NOTE ADULT - ASSESSMENT
67M PMH Chronic Systolic Heart Failure (ACC/AHA Stage D) due to NICMP s/p LVAD 6/12/17, GIB s/p Cautery (7/25/2017), known AV Malformations, Chronic Anemia due to numerous GIBs (off AC), A-Fib, VT s/p AICD. Pt admitted due to elevated LDH level of 646 (previously 466) for further evaluation and observation.   2/2 VSS ; INR 1.3- continue heparin and coumadin   meds as per CHF team  2/3 - monitor LDH twice a day; continue heparin and coumadin - INR 1.5- coumadin 3 mg today; continue meds as per CHF team   renal sono to evaluate left renal cyst   2/4 RAMP echo, no speed adjustments made  2/5 . Transplant evaluation in progress  2/6  Integrilin initiated, hydralazine 10mg tid  2/7 , INR 2.17, continue Heparin drip per HF. Increase integrilin drip to 1mcg, hydralazine to 25q8 and lasix daily.  2/8 [id number] INR 2.18  c/w heparin gtt intergrilin gTT 1 mcg  D/C lasix and  aldactone and Hold  coumadin tonight  repeat CBC stat  for 9.0/27.5 down from 11.2/34.7

## 2018-02-08 NOTE — PROGRESS NOTE ADULT - ASSESSMENT
66 yo man with a history of a nonischemic cardiomyopathy admitted with anemia, GI bleeding and being further evaluated for a possible transplant listing     HIV ab and HIV viral load PCR test- both are negative. Patient is HIV -  Please note that he is toxo ab. + and will need Bactrim prophylaxis  He will need vaccination for pneumococcal pneumonia ( had one vaccine at [facility])  He may need a second seasonal influenza vaccine  Needs HBV vaccine series- first dose ordered  HAV vaccine series- first dose oredered  Quantiferon gold testing - was negative    Received the first dose of HBV and HAV vaccines in the hospital and then follow up with ID clinic for the remaining doses  Prevnar and Influenza vaccinations ordered    There are no Infectious Disease contra-indications to listing the patient for a heart transplant at his time.    Gary Lujan MD  [phone]  After 5pm/weekends [phone]

## 2018-02-08 NOTE — PROGRESS NOTE ADULT - SUBJECTIVE AND OBJECTIVE BOX
INFECTIOUS DISEASES FOLLOW UP-- Sujey Lujan  [phone]    This is a follow up note for this  67yMale with  Heart replaced by heart assist device  undergoing pre-heart transplant evaluation    ROS:  CONSTITUTIONAL:  No fever, good appetite  CARDIOVASCULAR:  No chest pain or palpitations  RESPIRATORY:  No dyspnea  GASTROINTESTINAL:  No nausea, vomiting, diarrhea, or abdominal pain  GENITOURINARY:  No dysuria  NEUROLOGIC:  No headache,     Allergies    No Known Allergies    Intolerances        ANTIBIOTICS/RELEVANT:  antimicrobials    immunologic:  influenza  Vaccine (HIGH DOSE) 0.5 milliLiter(s) IntraMuscular once  pneumococcal  13 Vaccine (PREVNAR 13) 0.5 milliLiter(s) IntraMuscular once    OTHER:  acetaminophen   Tablet 650 milliGRAM(s) Oral every 6 hours PRN  ascorbic acid 500 milliGRAM(s) Oral two times a day  aspirin enteric coated 81 milliGRAM(s) Oral daily  carvedilol 25 milliGRAM(s) Oral every 12 hours  docusate sodium 100 milliGRAM(s) Oral three times a day  eptifibatide Infusion 75 mg/100 mL 1 MICROgram(s)/kG/Min IV Continuous <Continuous>  ferrous    sulfate 325 milliGRAM(s) Oral daily  heparin  Infusion 900 Unit(s)/Hr IV Continuous <Continuous>  hydrALAZINE 25 milliGRAM(s) Oral every 8 hours  hydrocortisone 2.5% Cream 1 Application(s) Topical two times a day  mexiletine 150 milliGRAM(s) Oral two times a day  pantoprazole    Tablet 40 milliGRAM(s) Oral before breakfast  polyethylene glycol 3350 17 Gram(s) Oral daily  QUEtiapine 50 milliGRAM(s) Oral at bedtime  senna 2 Tablet(s) Oral at bedtime      Objective:  Vital Signs Last 24 Hrs  T(C): 36.6 (08 Feb 2018 14:02), Max: 37 (07 Feb 2018 17:20)  T(F): 97.8 (08 Feb 2018 14:02), Max: 98.6 (07 Feb 2018 17:20)  HR: 81 (08 Feb 2018 14:02) (74 - 90)  BP: 93/58 (08 Feb 2018 14:02) (83/63 - 108/66)  BP(mean): 69 (08 Feb 2018 14:02) (69 - 90)  RR: 18 (08 Feb 2018 14:02) (17 - 18)  SpO2: 95% (08 Feb 2018 14:02) (94% - 99%)    PHYSICAL EXAM:  Constitutional:no acute distress  Eyes:WALT, EOMI  Ear/Nose/Throat: no oral lesions, 	  Respiratory: clear BL  Cardiovascular: LVAD sounds  sternotmy site healed  LVAD dressing dry and intact  Gastrointestinal:soft, (+) BS, no tenderness  Extremities:no e/e/c right knee chronically swollen  No Lymphadenopathy  IV sites not inflammed.    LABS:                        9.0    13.5  )-----------( 208      ( 08 Feb 2018 05:29 )             27.3     02-08    134<L>  |  101  |  62<H>  ----------------------------<  120<H>  5.2   |  23  |  1.66<H>    Ca    8.8      08 Feb 2018 05:28  Mg     2.4     02-07      PT/INR - ( 08 Feb 2018 05:29 )   PT: 23.9 sec;   INR: 2.18 ratio         PTT - ( 08 Feb 2018 05:29 )  PTT:74.8 sec      MICROBIOLOGY:      HIV-1 RNA Quantitative, Viral Load (02.02.18 @ 19:25)    HIV-1 Viral Load Result: NOT DET.    HIV-1 RNA Quantitative, Viral Load:   NOT DET.    HIV-1 RNA Quantitative, Vir Load Interp: See Comment Viral loads lower than 12 copies/ml cannot be detected reliably. Thus, a  Not Detected result does not necessarily rule out HIV-1 infection.  METHOD: Transcription mediated amplification (TMA) – [facility] [place].  Abbreviation:  NOT DET. and not det. = Not Detected  n/a = not available  .    HIV-1 RNA Quantitative, Viral Load Log: NOT DET. lg /mL          RECENT CULTURES:      RADIOLOGY & ADDITIONAL STUDIES:    < from: Xray Chest 1 View AP/PA (02.02.18 @ 05:48) >  IMPRESSION:    Probable mild left basilar atelectasis. The lungs are otherwise clear.    < end of copied text >

## 2018-02-08 NOTE — PROGRESS NOTE ADULT - PROBLEM SELECTOR PLAN 2
Continue heparin integrelin aspirin and coumadin   repeat CBC for drop in H/h; no reports of melena or hematochezia  Daily  LDH

## 2018-02-08 NOTE — PROGRESS NOTE ADULT - PROBLEM SELECTOR PLAN 1
Continue Coreg 25 BID   cont Hydralazine  25 TID   Mexiletine 150mg BID   d/c coumadin dosing  d/c lasix and aldactone given increased Cr without volume overload  Transplant Listed for OHT, blood type B, UNOS 1A status. No crossmatch needed (cPRA 0%).   ongoing and support given   ID consult appreciated receiving vaccinations   Id consult appreciated FLU / PNA vaccinations  this admission  Daily standing weights   Daily metabolic  reviewed with Team

## 2018-02-08 NOTE — PROGRESS NOTE ADULT - PROBLEM SELECTOR PLAN 1
Continue Amiodarone 200 daily/ coreg and mexiletine  Continue anticoagulation with heparin gttp and Integrilin gtt

## 2018-02-08 NOTE — PROGRESS NOTE ADULT - ATTENDING COMMENTS
No overnight issues, but Hgb declined and WBC rising. No overt blood loss and he denies melena.  No clinical sign of infection.    . MAP 69-90. INR 2.18.  VAD Interrogation without power spikes or events. No changes made.    Will reduce Integrilin to 0.5 mcg/kg/min given drop in Hgb, but with suspected pump thrombosis in an effort to balance risk of bleeding.  Lasix and aldactone held as he appears dry today with slight rise in SCr.  Warfarin stopped.  Now UNOS Status 7 until we sort out the infectious issues. Please send 2 separate blood cultures, UA, and do CXR.  Consult GI for drop in Hgb. Discontinue ASA.

## 2018-02-09 DIAGNOSIS — D72.829 ELEVATED WHITE BLOOD CELL COUNT, UNSPECIFIED: ICD-10-CM

## 2018-02-09 DIAGNOSIS — E87.5 HYPERKALEMIA: ICD-10-CM

## 2018-02-09 DIAGNOSIS — N17.9 ACUTE KIDNEY FAILURE, UNSPECIFIED: ICD-10-CM

## 2018-02-09 LAB
ALBUMIN SERPL ELPH-MCNC: 3.1 G/DL — LOW (ref 3.3–5)
ALBUMIN SERPL ELPH-MCNC: 3.1 G/DL — LOW (ref 3.3–5)
ALP SERPL-CCNC: 49 U/L — SIGNIFICANT CHANGE UP (ref 40–120)
ALP SERPL-CCNC: 54 U/L — SIGNIFICANT CHANGE UP (ref 40–120)
ALT FLD-CCNC: 17 U/L RC — SIGNIFICANT CHANGE UP (ref 10–45)
ALT FLD-CCNC: 19 U/L RC — SIGNIFICANT CHANGE UP (ref 10–45)
ANION GAP SERPL CALC-SCNC: 10 MMOL/L — SIGNIFICANT CHANGE UP (ref 5–17)
ANION GAP SERPL CALC-SCNC: 14 MMOL/L — SIGNIFICANT CHANGE UP (ref 5–17)
ANION GAP SERPL CALC-SCNC: 15 MMOL/L — SIGNIFICANT CHANGE UP (ref 5–17)
APPEARANCE UR: CLEAR — SIGNIFICANT CHANGE UP
APTT BLD: 28.2 SEC — SIGNIFICANT CHANGE UP (ref 27.5–37.4)
APTT BLD: 73.9 SEC — HIGH (ref 27.5–37.4)
AST SERPL-CCNC: 32 U/L — SIGNIFICANT CHANGE UP (ref 10–40)
AST SERPL-CCNC: 38 U/L — SIGNIFICANT CHANGE UP (ref 10–40)
BASE EXCESS BLDV CALC-SCNC: -3 MMOL/L — LOW (ref -2–2)
BILIRUB SERPL-MCNC: 0.3 MG/DL — SIGNIFICANT CHANGE UP (ref 0.2–1.2)
BILIRUB SERPL-MCNC: 0.4 MG/DL — SIGNIFICANT CHANGE UP (ref 0.2–1.2)
BILIRUB UR-MCNC: NEGATIVE — SIGNIFICANT CHANGE UP
BLD GP AB SCN SERPL QL: NEGATIVE — SIGNIFICANT CHANGE UP
BUN SERPL-MCNC: 103 MG/DL — HIGH (ref 7–23)
BUN SERPL-MCNC: 95 MG/DL — HIGH (ref 7–23)
BUN SERPL-MCNC: 98 MG/DL — HIGH (ref 7–23)
CALCIUM SERPL-MCNC: 8.5 MG/DL — SIGNIFICANT CHANGE UP (ref 8.4–10.5)
CALCIUM SERPL-MCNC: 8.5 MG/DL — SIGNIFICANT CHANGE UP (ref 8.4–10.5)
CALCIUM SERPL-MCNC: 8.8 MG/DL — SIGNIFICANT CHANGE UP (ref 8.4–10.5)
CHLORIDE SERPL-SCNC: 103 MMOL/L — SIGNIFICANT CHANGE UP (ref 96–108)
CHLORIDE SERPL-SCNC: 103 MMOL/L — SIGNIFICANT CHANGE UP (ref 96–108)
CHLORIDE SERPL-SCNC: 105 MMOL/L — SIGNIFICANT CHANGE UP (ref 96–108)
CO2 BLDV-SCNC: 23 MMOL/L — SIGNIFICANT CHANGE UP (ref 22–30)
CO2 SERPL-SCNC: 15 MMOL/L — LOW (ref 22–31)
CO2 SERPL-SCNC: 19 MMOL/L — LOW (ref 22–31)
CO2 SERPL-SCNC: 21 MMOL/L — LOW (ref 22–31)
COLOR SPEC: YELLOW — SIGNIFICANT CHANGE UP
COMMENT - URINE: SIGNIFICANT CHANGE UP
CREAT SERPL-MCNC: 2.1 MG/DL — HIGH (ref 0.5–1.3)
CREAT SERPL-MCNC: 2.11 MG/DL — HIGH (ref 0.5–1.3)
CREAT SERPL-MCNC: 2.12 MG/DL — HIGH (ref 0.5–1.3)
DIFF PNL FLD: NEGATIVE — SIGNIFICANT CHANGE UP
EPI CELLS # UR: SIGNIFICANT CHANGE UP /HPF
GAS PNL BLDA: SIGNIFICANT CHANGE UP
GAS PNL BLDV: SIGNIFICANT CHANGE UP
GLUCOSE BLDC GLUCOMTR-MCNC: 182 MG/DL — HIGH (ref 70–99)
GLUCOSE SERPL-MCNC: 131 MG/DL — HIGH (ref 70–99)
GLUCOSE SERPL-MCNC: 134 MG/DL — HIGH (ref 70–99)
GLUCOSE SERPL-MCNC: 156 MG/DL — HIGH (ref 70–99)
GLUCOSE UR QL: NEGATIVE — SIGNIFICANT CHANGE UP
HCO3 BLDV-SCNC: 22 MMOL/L — SIGNIFICANT CHANGE UP (ref 21–29)
HCT VFR BLD CALC: 21.4 % — LOW (ref 39–50)
HCT VFR BLD CALC: 25 % — LOW (ref 39–50)
HCT VFR BLD CALC: 25.9 % — LOW (ref 39–50)
HGB BLD-MCNC: 7 G/DL — CRITICAL LOW (ref 13–17)
HGB BLD-MCNC: 8.6 G/DL — LOW (ref 13–17)
HGB BLD-MCNC: 8.9 G/DL — LOW (ref 13–17)
HOROWITZ INDEX BLDV+IHG-RTO: 21 — SIGNIFICANT CHANGE UP
HYALINE CASTS # UR AUTO: ABNORMAL
INR BLD: 1.93 RATIO — HIGH (ref 0.88–1.16)
INR BLD: 2.08 RATIO — HIGH (ref 0.88–1.16)
KETONES UR-MCNC: NEGATIVE — SIGNIFICANT CHANGE UP
LDH SERPL L TO P-CCNC: 612 U/L — HIGH (ref 50–242)
LDH SERPL L TO P-CCNC: 701 U/L — HIGH (ref 50–242)
LEUKOCYTE ESTERASE UR-ACNC: NEGATIVE — SIGNIFICANT CHANGE UP
MAGNESIUM SERPL-MCNC: 2.8 MG/DL — HIGH (ref 1.6–2.6)
MCHC RBC-ENTMCNC: 31.9 PG — SIGNIFICANT CHANGE UP (ref 27–34)
MCHC RBC-ENTMCNC: 32.1 PG — SIGNIFICANT CHANGE UP (ref 27–34)
MCHC RBC-ENTMCNC: 32.2 PG — SIGNIFICANT CHANGE UP (ref 27–34)
MCHC RBC-ENTMCNC: 32.8 GM/DL — SIGNIFICANT CHANGE UP (ref 32–36)
MCHC RBC-ENTMCNC: 34.2 GM/DL — SIGNIFICANT CHANGE UP (ref 32–36)
MCHC RBC-ENTMCNC: 34.2 GM/DL — SIGNIFICANT CHANGE UP (ref 32–36)
MCV RBC AUTO: 93.1 FL — SIGNIFICANT CHANGE UP (ref 80–100)
MCV RBC AUTO: 93.9 FL — SIGNIFICANT CHANGE UP (ref 80–100)
MCV RBC AUTO: 98.2 FL — SIGNIFICANT CHANGE UP (ref 80–100)
NITRITE UR-MCNC: NEGATIVE — SIGNIFICANT CHANGE UP
NT-PROBNP SERPL-SCNC: 163 PG/ML — SIGNIFICANT CHANGE UP (ref 0–300)
OB PNL STL: NEGATIVE — SIGNIFICANT CHANGE UP
PCO2 BLDV: 43 MMHG — SIGNIFICANT CHANGE UP (ref 35–50)
PH BLDV: 7.33 — LOW (ref 7.35–7.45)
PH UR: 5.5 — SIGNIFICANT CHANGE UP (ref 5–8)
PHOSPHATE SERPL-MCNC: 4.9 MG/DL — HIGH (ref 2.5–4.5)
PLATELET # BLD AUTO: 171 K/UL — SIGNIFICANT CHANGE UP (ref 150–400)
PLATELET # BLD AUTO: 179 K/UL — SIGNIFICANT CHANGE UP (ref 150–400)
PLATELET # BLD AUTO: 215 K/UL — SIGNIFICANT CHANGE UP (ref 150–400)
PO2 BLDV: 35 MMHG — SIGNIFICANT CHANGE UP (ref 25–45)
POTASSIUM SERPL-MCNC: 5.5 MMOL/L — HIGH (ref 3.5–5.3)
POTASSIUM SERPL-MCNC: 6.1 MMOL/L — HIGH (ref 3.5–5.3)
POTASSIUM SERPL-MCNC: 6.1 MMOL/L — HIGH (ref 3.5–5.3)
POTASSIUM SERPL-MCNC: 6.5 MMOL/L — CRITICAL HIGH (ref 3.5–5.3)
POTASSIUM SERPL-SCNC: 5.5 MMOL/L — HIGH (ref 3.5–5.3)
POTASSIUM SERPL-SCNC: 6.1 MMOL/L — HIGH (ref 3.5–5.3)
POTASSIUM SERPL-SCNC: 6.1 MMOL/L — HIGH (ref 3.5–5.3)
POTASSIUM SERPL-SCNC: 6.5 MMOL/L — CRITICAL HIGH (ref 3.5–5.3)
PROT SERPL-MCNC: 6.1 G/DL — SIGNIFICANT CHANGE UP (ref 6–8.3)
PROT SERPL-MCNC: 6.4 G/DL — SIGNIFICANT CHANGE UP (ref 6–8.3)
PROT UR-MCNC: NEGATIVE — SIGNIFICANT CHANGE UP
PROTHROM AB SERPL-ACNC: 21.1 SEC — HIGH (ref 9.8–12.7)
PROTHROM AB SERPL-ACNC: 23 SEC — HIGH (ref 9.8–12.7)
RBC # BLD: 2.18 M/UL — LOW (ref 4.2–5.8)
RBC # BLD: 2.66 M/UL — LOW (ref 4.2–5.8)
RBC # BLD: 2.78 M/UL — LOW (ref 4.2–5.8)
RBC # FLD: 16.2 % — HIGH (ref 10.3–14.5)
RBC # FLD: 16.2 % — HIGH (ref 10.3–14.5)
RBC # FLD: 17.9 % — HIGH (ref 10.3–14.5)
RBC CASTS # UR COMP ASSIST: SIGNIFICANT CHANGE UP /HPF (ref 0–2)
RH IG SCN BLD-IMP: POSITIVE — SIGNIFICANT CHANGE UP
SAO2 % BLDV: 60 % — LOW (ref 67–88)
SODIUM SERPL-SCNC: 133 MMOL/L — LOW (ref 135–145)
SODIUM SERPL-SCNC: 136 MMOL/L — SIGNIFICANT CHANGE UP (ref 135–145)
SODIUM SERPL-SCNC: 136 MMOL/L — SIGNIFICANT CHANGE UP (ref 135–145)
SP GR SPEC: 1.02 — SIGNIFICANT CHANGE UP (ref 1.01–1.02)
UROBILINOGEN FLD QL: NEGATIVE — SIGNIFICANT CHANGE UP
WBC # BLD: 22.4 K/UL — HIGH (ref 3.8–10.5)
WBC # BLD: 23.9 K/UL — HIGH (ref 3.8–10.5)
WBC # BLD: 34.6 K/UL — HIGH (ref 3.8–10.5)
WBC # FLD AUTO: 22.4 K/UL — HIGH (ref 3.8–10.5)
WBC # FLD AUTO: 23.9 K/UL — HIGH (ref 3.8–10.5)
WBC # FLD AUTO: 34.6 K/UL — HIGH (ref 3.8–10.5)
WBC UR QL: SIGNIFICANT CHANGE UP /HPF (ref 0–5)

## 2018-02-09 PROCEDURE — 99222 1ST HOSP IP/OBS MODERATE 55: CPT

## 2018-02-09 PROCEDURE — 93750 INTERROGATION VAD IN PERSON: CPT

## 2018-02-09 PROCEDURE — 99291 CRITICAL CARE FIRST HOUR: CPT

## 2018-02-09 PROCEDURE — 44377 SMALL BOWEL ENDOSCOPY/BIOPSY: CPT | Mod: GC

## 2018-02-09 PROCEDURE — 71045 X-RAY EXAM CHEST 1 VIEW: CPT | Mod: 26

## 2018-02-09 PROCEDURE — 36556 INSERT NON-TUNNEL CV CATH: CPT

## 2018-02-09 PROCEDURE — 71045 X-RAY EXAM CHEST 1 VIEW: CPT | Mod: 26,77

## 2018-02-09 PROCEDURE — 36620 INSERTION CATHETER ARTERY: CPT

## 2018-02-09 PROCEDURE — 99222 1ST HOSP IP/OBS MODERATE 55: CPT | Mod: 25,GC

## 2018-02-09 PROCEDURE — 99232 SBSQ HOSP IP/OBS MODERATE 35: CPT

## 2018-02-09 PROCEDURE — 99233 SBSQ HOSP IP/OBS HIGH 50: CPT | Mod: 25,GC

## 2018-02-09 RX ORDER — HYDROMORPHONE HYDROCHLORIDE 2 MG/ML
0.5 INJECTION INTRAMUSCULAR; INTRAVENOUS; SUBCUTANEOUS ONCE
Qty: 0 | Refills: 0 | Status: DISCONTINUED | OUTPATIENT
Start: 2018-02-09 | End: 2018-02-09

## 2018-02-09 RX ORDER — SODIUM BICARBONATE 1 MEQ/ML
50 SYRINGE (ML) INTRAVENOUS ONCE
Qty: 0 | Refills: 0 | Status: COMPLETED | OUTPATIENT
Start: 2018-02-09 | End: 2018-02-09

## 2018-02-09 RX ORDER — VANCOMYCIN HCL 1 G
1000 VIAL (EA) INTRAVENOUS
Qty: 0 | Refills: 0 | Status: DISCONTINUED | OUTPATIENT
Start: 2018-02-09 | End: 2018-02-10

## 2018-02-09 RX ORDER — VASOPRESSIN 20 [USP'U]/ML
0.03 INJECTION INTRAVENOUS
Qty: 100 | Refills: 0 | Status: DISCONTINUED | OUTPATIENT
Start: 2018-02-09 | End: 2018-02-13

## 2018-02-09 RX ORDER — CARVEDILOL PHOSPHATE 80 MG/1
25 CAPSULE, EXTENDED RELEASE ORAL EVERY 12 HOURS
Qty: 0 | Refills: 0 | Status: DISCONTINUED | OUTPATIENT
Start: 2018-02-09 | End: 2018-02-09

## 2018-02-09 RX ORDER — PROPOFOL 10 MG/ML
10.72 INJECTION, EMULSION INTRAVENOUS
Qty: 500 | Refills: 0 | Status: DISCONTINUED | OUTPATIENT
Start: 2018-02-09 | End: 2018-02-09

## 2018-02-09 RX ORDER — DEXTROSE 50 % IN WATER 50 %
25 SYRINGE (ML) INTRAVENOUS ONCE
Qty: 0 | Refills: 0 | Status: COMPLETED | OUTPATIENT
Start: 2018-02-09 | End: 2018-02-09

## 2018-02-09 RX ORDER — CEFEPIME 1 G/1
1000 INJECTION, POWDER, FOR SOLUTION INTRAMUSCULAR; INTRAVENOUS EVERY 8 HOURS
Qty: 0 | Refills: 0 | Status: DISCONTINUED | OUTPATIENT
Start: 2018-02-09 | End: 2018-02-12

## 2018-02-09 RX ORDER — INSULIN HUMAN 100 [IU]/ML
10 INJECTION, SOLUTION SUBCUTANEOUS ONCE
Qty: 0 | Refills: 0 | Status: COMPLETED | OUTPATIENT
Start: 2018-02-09 | End: 2018-02-09

## 2018-02-09 RX ORDER — ALBUTEROL 90 UG/1
2.5 AEROSOL, METERED ORAL ONCE
Qty: 0 | Refills: 0 | Status: COMPLETED | OUTPATIENT
Start: 2018-02-09 | End: 2018-02-09

## 2018-02-09 RX ORDER — FENTANYL CITRATE 50 UG/ML
50 INJECTION INTRAVENOUS ONCE
Qty: 0 | Refills: 0 | Status: DISCONTINUED | OUTPATIENT
Start: 2018-02-09 | End: 2018-02-09

## 2018-02-09 RX ORDER — CARVEDILOL PHOSPHATE 80 MG/1
6.25 CAPSULE, EXTENDED RELEASE ORAL EVERY 12 HOURS
Qty: 0 | Refills: 0 | Status: DISCONTINUED | OUTPATIENT
Start: 2018-02-09 | End: 2018-02-10

## 2018-02-09 RX ORDER — DEXTROSE 50 % IN WATER 50 %
50 SYRINGE (ML) INTRAVENOUS ONCE
Qty: 0 | Refills: 0 | Status: COMPLETED | OUTPATIENT
Start: 2018-02-09 | End: 2018-02-09

## 2018-02-09 RX ORDER — CALCIUM GLUCONATE 100 MG/ML
1 VIAL (ML) INTRAVENOUS ONCE
Qty: 0 | Refills: 0 | Status: COMPLETED | OUTPATIENT
Start: 2018-02-09 | End: 2018-02-09

## 2018-02-09 RX ORDER — INSULIN HUMAN 100 [IU]/ML
10 INJECTION, SOLUTION SUBCUTANEOUS ONCE
Qty: 0 | Refills: 0 | Status: DISCONTINUED | OUTPATIENT
Start: 2018-02-09 | End: 2018-02-09

## 2018-02-09 RX ORDER — PANTOPRAZOLE SODIUM 20 MG/1
8 TABLET, DELAYED RELEASE ORAL
Qty: 80 | Refills: 0 | Status: DISCONTINUED | OUTPATIENT
Start: 2018-02-09 | End: 2018-02-13

## 2018-02-09 RX ADMIN — MEXILETINE HYDROCHLORIDE 150 MILLIGRAM(S): 150 CAPSULE ORAL at 17:50

## 2018-02-09 RX ADMIN — Medication 50 MILLILITER(S): at 04:06

## 2018-02-09 RX ADMIN — Medication 1 APPLICATION(S): at 17:49

## 2018-02-09 RX ADMIN — Medication 50 MILLIEQUIVALENT(S): at 14:37

## 2018-02-09 RX ADMIN — SENNA PLUS 2 TABLET(S): 8.6 TABLET ORAL at 21:08

## 2018-02-09 RX ADMIN — ALBUTEROL 2.5 MILLIGRAM(S): 90 AEROSOL, METERED ORAL at 06:48

## 2018-02-09 RX ADMIN — CEFEPIME 100 MILLIGRAM(S): 1 INJECTION, POWDER, FOR SOLUTION INTRAMUSCULAR; INTRAVENOUS at 10:44

## 2018-02-09 RX ADMIN — Medication 1 APPLICATION(S): at 06:49

## 2018-02-09 RX ADMIN — HYDROMORPHONE HYDROCHLORIDE 0.5 MILLIGRAM(S): 2 INJECTION INTRAMUSCULAR; INTRAVENOUS; SUBCUTANEOUS at 10:44

## 2018-02-09 RX ADMIN — Medication 250 MILLIGRAM(S): at 10:46

## 2018-02-09 RX ADMIN — Medication 100 MILLIGRAM(S): at 21:08

## 2018-02-09 RX ADMIN — INSULIN HUMAN 10 UNIT(S): 100 INJECTION, SOLUTION SUBCUTANEOUS at 04:06

## 2018-02-09 RX ADMIN — CARVEDILOL PHOSPHATE 25 MILLIGRAM(S): 80 CAPSULE, EXTENDED RELEASE ORAL at 08:55

## 2018-02-09 RX ADMIN — MEXILETINE HYDROCHLORIDE 150 MILLIGRAM(S): 150 CAPSULE ORAL at 06:48

## 2018-02-09 RX ADMIN — FENTANYL CITRATE 50 MICROGRAM(S): 50 INJECTION INTRAVENOUS at 11:50

## 2018-02-09 RX ADMIN — INSULIN HUMAN 10 UNIT(S): 100 INJECTION, SOLUTION SUBCUTANEOUS at 13:10

## 2018-02-09 RX ADMIN — QUETIAPINE FUMARATE 50 MILLIGRAM(S): 200 TABLET, FILM COATED ORAL at 21:08

## 2018-02-09 RX ADMIN — Medication 50 MILLIEQUIVALENT(S): at 04:06

## 2018-02-09 RX ADMIN — HYDROMORPHONE HYDROCHLORIDE 0.5 MILLIGRAM(S): 2 INJECTION INTRAMUSCULAR; INTRAVENOUS; SUBCUTANEOUS at 11:00

## 2018-02-09 RX ADMIN — VASOPRESSIN 2 UNIT(S)/MIN: 20 INJECTION INTRAVENOUS at 21:08

## 2018-02-09 RX ADMIN — Medication 200 GRAM(S): at 04:15

## 2018-02-09 RX ADMIN — PANTOPRAZOLE SODIUM 10 MG/HR: 20 TABLET, DELAYED RELEASE ORAL at 12:40

## 2018-02-09 RX ADMIN — PANTOPRAZOLE SODIUM 40 MILLIGRAM(S): 20 TABLET, DELAYED RELEASE ORAL at 06:48

## 2018-02-09 RX ADMIN — Medication 500 MILLIGRAM(S): at 17:51

## 2018-02-09 RX ADMIN — FENTANYL CITRATE 50 MICROGRAM(S): 50 INJECTION INTRAVENOUS at 11:30

## 2018-02-09 RX ADMIN — Medication 125 MILLILITER(S): at 21:30

## 2018-02-09 RX ADMIN — PANTOPRAZOLE SODIUM 10 MG/HR: 20 TABLET, DELAYED RELEASE ORAL at 21:08

## 2018-02-09 RX ADMIN — Medication 25 MILLILITER(S): at 13:10

## 2018-02-09 RX ADMIN — CEFEPIME 100 MILLIGRAM(S): 1 INJECTION, POWDER, FOR SOLUTION INTRAMUSCULAR; INTRAVENOUS at 18:01

## 2018-02-09 NOTE — PROGRESS NOTE ADULT - ASSESSMENT
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 He was resumed on coumadin but remained subtherapeutic and due to inability to use Lovenox given his history of GIBs he was admitted  and started on a heparin gtt ? coumadin. with concern LVAD pump thrombosis . Was on a heparin gtt w/ Coumadin, ASA and an integrilin gtt and now with a large drop in his H&H so all A/C and anti-platelets held and transferred to CTU. He currently remains stable.

## 2018-02-09 NOTE — PROGRESS NOTE ADULT - PROBLEM SELECTOR PLAN 2
Repeat CBC post pRBC transfustion; monitor for melena or hematochezia  Daily  LDH; concern for pump thrombosis and may need pump exchange

## 2018-02-09 NOTE — CONSULT NOTE ADULT - PROBLEM SELECTOR RECOMMENDATION 9
In setting of obstructive uropathy and LVAD thrombosis: Scr. was elevated at 1.43 on 2/1/18 which then worsened to 2.11 on 2/9/18. Rosas was placed and drained about 695 cc. Patient with NORMAN secondary to obstructive uropathy. Continue to monitor Scr., electrolytes, and urine output. Avoid nephrotoxins. In setting of obstructive uropathy: Scr. was elevated at 1.43 on 2/1/18 which then worsened to 2.11 on 2/9/18. Rosas was placed and drained about 695 cc. Patient with NORMAN secondary to obstructive uropathy. Continue to monitor Scr., electrolytes, and urine output. Avoid nephrotoxins.

## 2018-02-09 NOTE — CONSULT NOTE ADULT - SUBJECTIVE AND OBJECTIVE BOX
[facility] Division of Kidney Diseases & Hypertension  INITIAL CONSULT NOTE  [phone]--------------------------------------------------------------------------------    HPI: 67 year old male with h/o systolic CHF s/p LVAD on 6/12/17, GIB, HTN, A.fib, V tach s/p AICD was admitted on 2/1/18 for LVAD thrombosis and worsening anemia. Nephrology was consulted for elevated creatinine. Scr. was elevated at 1.43 on 2/1/18 which then worsened to 2.11 on 2/9/18. On review of previous labs, patient had multiple episodes of NORMAN during previous hospitalizations. Rosas was placed and drained about 800 cc of urine. Patient was transferred to CTICU today for worsening anemia and had enteroscopy done. Currently patient intubated; unable to provide further history.     PAST HISTORY  --------------------------------------------------------------------------------  PAST MEDICAL & SURGICAL HISTORY:  GIB (gastrointestinal bleeding)  Ventricular fibrillation: s/p AICD  PAF (paroxysmal atrial fibrillation): on xarelto  Non-Ischemic Cardiomyopathy  SVT (Supraventricular Tachycardia)  HTN  CHF (Congestive Heart Failure)  LVAD (left ventricular assist device) present  Status post left hip replacement  History of Prior Ablation Treatment: for afib  AICD (Automatic Cardioverter/Defibrillator) Present: [employer] with 1 [employer] lead4/1/09- explanted and replaced with [employer] 2 leads on 9/2/09    FAMILY HISTORY:  No pertinent family history in first degree relatives    PAST SOCIAL HISTORY:    ALLERGIES & MEDICATIONS  --------------------------------------------------------------------------------  Allergies    No Known Allergies    Intolerances      Standing Inpatient Medications  ascorbic acid 500 milliGRAM(s) Oral two times a day  carvedilol 6.25 milliGRAM(s) Oral every 12 hours  cefepime  IVPB 1000 milliGRAM(s) IV Intermittent every 8 hours  docusate sodium 100 milliGRAM(s) Oral three times a day  ferrous    sulfate 325 milliGRAM(s) Oral daily  hydrocortisone 2.5% Cream 1 Application(s) Topical two times a day  mexiletine 150 milliGRAM(s) Oral two times a day  pantoprazole Infusion 8 mG/Hr IV Continuous <Continuous>  polyethylene glycol 3350 17 Gram(s) Oral daily  propofol Infusion 10.725 MICROgram(s)/kG/Min IV Continuous <Continuous>  QUEtiapine 50 milliGRAM(s) Oral at bedtime  senna 2 Tablet(s) Oral at bedtime  vancomycin  IVPB 1000 milliGRAM(s) IV Intermittent <User Schedule>    PRN Inpatient Medications  acetaminophen   Tablet 650 milliGRAM(s) Oral every 6 hours PRN      REVIEW OF SYSTEMS  --------------------------------------------------------------------------------  Unable to obtain.     VITALS/PHYSICAL EXAM  --------------------------------------------------------------------------------  T(C): 36.8 (02-09-18 @ 12:00), Max: 36.8 (02-09-18 @ 01:00)  HR: 94 (02-09-18 @ 13:45) (78 - 102)  BP: 79/58 (02-09-18 @ 11:00) (79/58 - 86/63)  RR: 15 (02-09-18 @ 13:45) (12 - 32)  SpO2: 94% (02-09-18 @ 13:45) (94% - 100%)  Wt(kg): --        02-08-18 @ 07:01  -  02-09-18 @ 07:00  --------------------------------------------------------  IN: 776.6 mL / OUT: 700 mL / NET: 76.6 mL    02-09-18 @ 07:01  -  02-09-18 @ 14:58  --------------------------------------------------------  IN: 353 mL / OUT: 695 mL / NET: -342 mL      Physical Exam:  	Gen: Patient intubated, on mechanical ventilator   	HEENT: no JVD  	Pulm: CTA B/L  	CV:  S1S2  	Abd: +BS, soft              : emily present.   	Ext: No B/L Lower ext edema  	Neuro: No focal deficits  	Skin: Warm and dry     LABS/STUDIES  --------------------------------------------------------------------------------              8.9    23.9  >-----------<  171      [02-09-18 @ 12:22]              25.9     136  |  105  |  103  ----------------------------<  131      [02-09-18 @ 12:22]  6.1   |  21  |  2.10        Ca     8.5     [02-09-18 @ 12:22]      Mg     2.8     [02-09-18 @ 02:38]      Phos  4.9     [02-09-18 @ 02:38]    TPro  6.1  /  Alb  3.1  /  TBili  0.4  /  DBili  x   /  AST  32  /  ALT  17  /  AlkPhos  49  [02-09-18 @ 12:22]    PT/INR: PT 21.1 , INR 1.93       [02-09-18 @ 13:06]  PTT: 28.2       [02-09-18 @ 13:06]          [02-09-18 @ 12:22]    Creatinine Trend:  SCr 2.10 [02-09 @ 12:22]  SCr 2.12 [02-09 @ 04:09]  SCr 2.11 [02-09 @ 02:38]  SCr 1.66 [02-08 @ 05:28]  SCr 1.58 [02-07 @ 07:14]    Urinalysis - [02-09-18 @ 12:27]      Color Yellow / Appearance Clear / SG 1.023 / pH 5.5      Gluc Negative / Ketone Negative  / Bili Negative / Urobili Negative       Blood Negative / Protein Negative / Leuk Est Negative / Nitrite Negative      RBC 0-2 / WBC 0-2 / Hyaline 5-10 / Gran  / Sq Epi  / Non Sq Epi OCC / Bacteria         HIV Nonreact      [02-02-18 @ 19:25]

## 2018-02-09 NOTE — PROGRESS NOTE ADULT - SUBJECTIVE AND OBJECTIVE BOX
BILLY RUSSELL   MRN#: [id number]     The patient is a 67y Male Chronic Systolic Heart Failure (ACC/AHA Stage D) due to NICMP s/p LVAD 6/12/17, GIB s/p Cautery (7/25/2017), known AV Malformations, Chronic Anemia due to numerous GIBs (off AC), A-Fib, VT s/p AICD admitted due to elevated LDH and now in CTU s/p intubation and upper endoscopy for melena and GI bleed who was seen, evaluated, & examined with the CTICU staff on rounds and later in the day with a multidisciplinary care plan formulated & implemented.  All available clinical, laboratory, radiographic, pharmacologic, and electrocardiographic data were reviewed & analyzed.      The patient was in the CTICU in critical condition secondary to persistent cardiopulmonary dysfunction, cardiovascular dysfunction-S/P LVAD, hemodynamically significant anemia/hypovolemia-shock, hyperlactatemia-acidos & uncontrolled Type II Diabetes melitus-stress hyperglycemia.      Respiratory status-failure required supplemental oxygen-full ventilatory support, the following of ABG’s with A-line monitoring, continuous pulse oximetry monitoring, inhaled Nitric oxide, an IV Propofol infusion, an IV Dexmedetomidine infusion, and intermittent IV Fentanyl for support & to evaluate for & prevent further decompensation secondary to persistent cardiopulmonary dysfunction and cardiogenic shock-cardiovascular dysfunction-S/P LVAD .      Invasive hemodynamic monitoring with a PA catheter & an A-line were required for the following of serial CI’s/MVO2’s & continuous MAP/BP monitoring to ensure adequate cardiovascular support and to evaluate for & help prevent decompensation while receiving intermittent volume expansion, blood transfusions, blood product transfusions, an IV Levophed drip, an IV Vasopressin drip, an IV Epinephrine drip, an IV Dobutamine drip, inhaled Nitric oxide, IV Primacor drip, an IV Amiodarone drip, & an IV Phenylephrine drip secondary to hemodynamically significant anemia/hypovolemia-shock, cardiogenic shock-cardiovascular dysfunction-S/P LVAD, hemodynamically significant anemia/hypovolemia-shock, hyperlactatemia-acidosis, acute postoeprative blood loss anemia, & acute on chronic postoperative kidney injury-failure.       Metabolic stability, uncontrolled type II Diabetes mellitus-stress hyperglycemia, & infection prophylaxis required an IV regular Insulin drip & the following of serial glucose levels to help achieve & maintain euglycemia.      Patient required more than the usual postoperative critical care management and I provided 110 minutes of non-continuous care to the patient.  Discussed at length with the CTICU staff and helped coordinate care. BILLY RUSSELL   MRN#: [id number]     The patient is a 67y Male Chronic Systolic Heart Failure (ACC/AHA Stage D) due to NICMP s/p LVAD 6/12/17, GIB s/p Cautery (7/25/2017), known AV Malformations, Chronic Anemia due to numerous GIBs (off AC), A-Fib, VT s/p AICD admitted due to elevated LDH and now in CTU s/p intubation and upper endoscopy for melena and GI bleed who was seen, evaluated, & examined with the CTICU staff on rounds and later in the day with a multidisciplinary care plan formulated & implemented.  All available clinical, laboratory, radiographic, pharmacologic, and electrocardiographic data were reviewed & analyzed.      The patient was in the CTICU in critical condition secondary to persistent cardiopulmonary dysfunction, cardiovascular dysfunction-S/P LVAD, hemodynamically significant anemia/hypovolemia-shock, hyperkalemia and acute on chronic renal insufficiency as well as ongoing possible pump thrombosis with significantly elevated LDH.    Respiratory status-failure required supplemental oxygen after extubation today, the following of ABG’s with A-line monitoring and continuous pulse oximetry monitoring for support & to evaluate for & prevent further decompensation secondary to persistent cardiopulmonary dysfunction.    Invasive hemodynamic monitoring with a central venous and arterial cathete were required for the following of continuous central venous and MAP/BP monitoring as well as LVAD monitoring screen to ensure adequate cardiovascular support and to evaluate for & help prevent decompensation while receiving intermittent volume expansion and an IV Vasopressin drip secondary to hemodynamically significant anemia/hypovolemia-shock and cardiovascular dysfunction-S/P LVAD, and acute on chronic renal insufficiency with hyperkalemia.      Patient has received intermittent IV glucose and insulin for treatment of hyperkalemia with ultimate treatment being maximizing renal perfusion and subsequently urinary excretion.      Empiric Cefepime and Vancomycin ordered as per ID input and in light of leukocytosis pending blood culture results.    Patient required more than the usual postoperative critical care management and I provided 35 minutes of non-continuous care to the patient.  Discussed at length with the CTICU staff and helped coordinate care.

## 2018-02-09 NOTE — CHART NOTE - NSCHARTNOTEFT_GEN_A_CORE
pt transferred back to CTU as per CHF team and Dr. Parker for further evaluation and treatment  +leukocytosis/ ARF and anemia with near syncopal episode overnight.   2 units PRBC infusing at this time.  Tx back back to CTU this am.  D/w patient pt transferred back to CTU as per CHF team and Dr. Parker for further evaluation and treatment  +leukocytosis/ ARF and anemia with near syncopal episode overnight.   2 units PRBC infusing at this time and NPO for possible endoscopy with GI today.  Tx back back to CTU this am.    D/w patient

## 2018-02-09 NOTE — PROGRESS NOTE ADULT - SUBJECTIVE AND OBJECTIVE BOX
24H hour events:     Substantial drop in H&H, now off of anticoagulation and Integrilin and moved to CTU.    Denies: HA/palpitations/CP/SOB/PND/orthopnea/LE edema    LVAD Interrogation: HM2  RPMs: 9200  Flow: 5.2  Power: 5.3  PI: 7.4  Event: few PI events  No programming changes were made    MAP goal 70-80  INR goal:  current anticoagulation: currently held    MEDICATIONS:  carvedilol 25 milliGRAM(s) Oral every 12 hours  mexiletine 150 milliGRAM(s) Oral two times a day  cefepime  IVPB 1000 milliGRAM(s) IV Intermittent every 8 hours  vancomycin  IVPB 1000 milliGRAM(s) IV Intermittent <User Schedule>  acetaminophen   Tablet 650 milliGRAM(s) Oral every 6 hours PRN  QUEtiapine 50 milliGRAM(s) Oral at bedtime  docusate sodium 100 milliGRAM(s) Oral three times a day  pantoprazole Infusion 8 mG/Hr IV Continuous <Continuous>  polyethylene glycol 3350 17 Gram(s) Oral daily  senna 2 Tablet(s) Oral at bedtime  ascorbic acid 500 milliGRAM(s) Oral two times a day  ferrous    sulfate 325 milliGRAM(s) Oral daily  hydrocortisone 2.5% Cream 1 Application(s) Topical two times a day      PHYSICAL EXAM:  T(C): 36.7 (02-09-18 @ 09:45), Max: 36.8 (02-09-18 @ 01:00)  HR: 89 (02-09-18 @ 10:15) (78 - 102)  BP: 86/63 (02-09-18 @ 10:15) (84/52 - 93/58)  RR: 17 (02-09-18 @ 10:15) (16 - 32)  SpO2: 99% (02-09-18 @ 10:15) (95% - 100%)  Wt(kg): --  I&O's Summary    08 Feb 2018 07:01  -  09 Feb 2018 07:00  --------------------------------------------------------  IN: 776.6 mL / OUT: 700 mL / NET: 76.6 mL    09 Feb 2018 07:01  -  09 Feb 2018 11:46  --------------------------------------------------------  IN: 300 mL / OUT: 500 mL / NET: -200 mL    Appearance: NAD  HEENT: Normal JVP  Cardiovascular: Audible S1 S2  Typical LVAD sounds; Driveline site: clean, dry, intact  Respiratory: Lungs clear to auscultation  Psychiatry: A & O x 3, Mood & affect appropriate  Gastrointestinal:  Soft, Non-tender, + BS	  Skin: No rashes, No ecchymoses, No cyanosis  Neurologic: Non-focal  Extremities: No cyanosis, clubbing or edema    LABS:    CBC Full  -  ( 09 Feb 2018 02:38 )  WBC Count : 22.4 K/uL  Hemoglobin : 7.0 g/dL  Hematocrit : 21.4 %  Platelet Count - Automated : 215 K/uL  Mean Cell Volume : 98.2 fl  Mean Cell Hemoglobin : 32.2 pg  Mean Cell Hemoglobin Concentration : 32.8 gm/dL      02-09    x   |  x   |  x   ----------------------------<  x   5.5<H>   |  x   |  x   02-09    133<L>  |  103  |  98<H>  ----------------------------<  134<H>  6.5<HH>   |  15<L>  |  2.12<H>    Ca    8.5      09 Feb 2018 04:09  Ca    8.8      09 Feb 2018 02:38  Phos  4.9     02-09  Mg     2.8     02-09    TPro  6.4  /  Alb  3.1<L>  /  TBili  0.3  /  DBili  x   /  AST  38  /  ALT  19  /  AlkPhos  54  02-09    TELEMETRY: sinus 90s

## 2018-02-09 NOTE — PROGRESS NOTE ADULT - ASSESSMENT
68 yo man with a history of a nonischemic cardiomyopathy admitted with anemia, GI bleeding and being further evaluated for a possible transplant listing     HIV ab and HIV viral load PCR test- both are negative. Patient is HIV -  Please note that he is toxo ab. + and will need Bactrim prophylaxis  He will need vaccination for pneumococcal pneumonia ( had one vaccine at [facility])  He may need a second seasonal influenza vaccine  Needs HBV vaccine series- first dose ordered  HAV vaccine series- first dose oredered  Quantiferon gold testing - was negative    Transplant evaluation was placed on hold with new GI bleeding and leukocytosis developing over the past 24 hours. The leukocytosis is of unclear etiology but possible related to his GI bleeding: blood cultures x 2 sets were sent and are pending. Empiric antibiotics with Cefepime and Vancomycin were started pending the blood culture results.    Gary Lujan MD  [phone]  After 5pm/weekends [phone]

## 2018-02-09 NOTE — CONSULT NOTE ADULT - ASSESSMENT
67 year old male with h/o systolic CHF s/p LVAD on 6/12/17, GIB, HTN, A.fib, V tach s/p AICD was admitted on 2/1/18 for LVAD thrombosis and worsening anemia; found to have NORMAN due to obstructive uropathy.

## 2018-02-09 NOTE — CONSULT NOTE ADULT - PROBLEM SELECTOR RECOMMENDATION 2
In setting of renal failure: Serum K noted to be elevated today. Patient received insulin with D50 and calcium gluconate. Continue with medical management with intracellular shifting agents for now. Monitor serum potassium.

## 2018-02-09 NOTE — CHART NOTE - NSCHARTNOTEFT_GEN_A_CORE
Source: Patient [ x ]    Family [ ]     other [ x ] RN, team, Comprehensive chart review     Pt with a good appetite and eating 100% of meals up until 2 days ago. States over the past 2 days appetite has decreased as he is not feeling well and has been experiencing nausea, but no vomiting. Reports constipation with last BM passed 3 days ago- RN aware. Pt states "episode" occurred overnight while he was trying to have a BM. Pt requested to defer diet education at this time as he is not feeling so well.    Chart reviewed- pt with LVAD, admitted with increasing LDH with concern for pump thrombosis, with anemia and GI bleed; pre-syncopal episode last night (pale, diaphoretic, and slightly altered mental status). Per RN pt currently receiving 1 unit PRBC and plan for a second unit due to drop in hemoglobin, NPO for possible EGD. Per d/w team possible plan for OR, and now listed as UNOS status 7.     Diet : NPO after midnight. Was previously receiving DASH/TLC diet with Ensure Enlive 2 cans/day.    Admission Weight: 78.9kg  Current Weight: 77.9kg (2/8)  Weight fluctuations noted. Pt with 1+bilateral leg edema.     Pertinent Medications: MEDICATIONS  (STANDING):  ascorbic acid 500 milliGRAM(s) Oral two times a day  carvedilol 25 milliGRAM(s) Oral every 12 hours  docusate sodium 100 milliGRAM(s) Oral three times a day  ferrous    sulfate 325 milliGRAM(s) Oral daily  hydrocortisone 2.5% Cream 1 Application(s) Topical two times a day  mexiletine 150 milliGRAM(s) Oral two times a day  pantoprazole    Tablet 40 milliGRAM(s) Oral before breakfast  polyethylene glycol 3350 17 Gram(s) Oral daily  QUEtiapine 50 milliGRAM(s) Oral at bedtime  senna 2 Tablet(s) Oral at bedtime    MEDICATIONS  (PRN):  acetaminophen   Tablet 650 milliGRAM(s) Oral every 6 hours PRN For Temp over 38.3 C (100.94 F)    Pertinent Labs:    Complete Blood Count (02.09.18 @ 02:38)    Hemoglobin: 7.0 g/dL - low    Hematocrit: 21.4 % - low  Comprehensive Metabolic Panel (02.09.18 @ 04:09)    Sodium, Serum: 133 mmol/L - low    Potassium, Serum: 6.5: TEST REPEATED. mmol/L - high    Chloride, Serum: 103 mmol/L    Carbon Dioxide, Serum: 15 mmol/L    Anion Gap, Serum: 15 mmol/L    Blood Urea Nitrogen, Serum: 98 mg/dL - high    Creatinine, Serum: 2.12 mg/dL - high    Glucose, Serum: 134 mg/dL - high    Calcium, Total Serum: 8.5 mg/dL    Protein Total, Serum: 6.4 g/dL    Albumin, Serum: 3.1 g/dL - low    Bilirubin Total, Serum: 0.3 mg/dL    Alkaline Phosphatase, Serum: 54 U/L    Aspartate Aminotransferase (AST/SGOT): 38 U/L    Alanine Aminotransferase (ALT/SGPT): 19 U/L RC  Lactate Dehydrogenase, Serum (02.09.18 @ 02:38)    Lactate Dehydrogenase, Serum: 701 U/L - high  Magnesium, Serum (02.09.18 @ 02:38)    Magnesium, Serum: 2.8 mg/dL - high  Phosphorus Level, Serum (02.09.18 @ 02:38)    Phosphorus Level, Serum: 4.9 mg/dL - high  POCT  Blood Glucose (02.09.18 @ 02:04)    POCT Blood Glucose.: 182 mg/dL    Skin: No pressure ulcers noted.    Estimated Needs:   [ x ] no change since previous assessment  [ ] recalculated:       Previous Nutrition Diagnosis:   Limited adherence to nutrition related recommendations remains, to be addressed with continued diet education.        New Nutrition Diagnosis:  Inadequate Oral Intake related to altered GI function (constipation and nausea) as evidenced by pt consuming ~25% of meals for past 2 days.    Interventions:   1. When medically feasible recommend advance diet to DASH/TLC, no concentrated potassium, no concentrated phosphorus with Ensure Enlive 2 cans/day.  2. Encourage PO intake with all meals.  3. Provide food preferences within therapeutic diet when requested.   4. RD to follow up with diet education as accepted by pt.        Monitoring and Evaluation:     [ x ] PO intake [ x ] Tolerance to diet prescription [ x ] weights [ x ] follow up per protocol    [ ] other: Source: Patient [ x ]    Family [ ]     other [ x ] RN, team, Comprehensive chart review     Pt with a good appetite and eating 100% of meals up until 2 days ago. States over the past 2 days appetite has decreased as he is not feeling well and has been experiencing nausea, but no vomiting. Reports constipation with last BM passed 3 days ago- RN aware. Pt states "episode" occurred overnight while he was trying to have a BM. Pt requested to defer diet education at this time as he is not feeling so well.    Chart reviewed- pt with LVAD, admitted with increasing LDH with concern for pump thrombosis, with anemia and GI bleed; pre-syncopal episode last night (pale, diaphoretic, and slightly altered mental status). Per RN pt currently receiving 1 unit PRBC and plan for a second unit due to drop in hemoglobin, NPO for possible EGD. Per d/w team possible plan for OR.     Diet : NPO after midnight. Was previously receiving DASH/TLC diet with Ensure Enlive 2 cans/day.    Admission Weight: 78.9kg  Current Weight: 77.9kg (2/8)  Weight fluctuations noted. Pt with 1+bilateral leg edema.     Pertinent Medications: MEDICATIONS  (STANDING):  ascorbic acid 500 milliGRAM(s) Oral two times a day  carvedilol 25 milliGRAM(s) Oral every 12 hours  docusate sodium 100 milliGRAM(s) Oral three times a day  ferrous    sulfate 325 milliGRAM(s) Oral daily  hydrocortisone 2.5% Cream 1 Application(s) Topical two times a day  mexiletine 150 milliGRAM(s) Oral two times a day  pantoprazole    Tablet 40 milliGRAM(s) Oral before breakfast  polyethylene glycol 3350 17 Gram(s) Oral daily  QUEtiapine 50 milliGRAM(s) Oral at bedtime  senna 2 Tablet(s) Oral at bedtime    MEDICATIONS  (PRN):  acetaminophen   Tablet 650 milliGRAM(s) Oral every 6 hours PRN For Temp over 38.3 C (100.94 F)    Pertinent Labs:    Complete Blood Count (02.09.18 @ 02:38)    Hemoglobin: 7.0 g/dL - low    Hematocrit: 21.4 % - low  Comprehensive Metabolic Panel (02.09.18 @ 04:09)    Sodium, Serum: 133 mmol/L - low    Potassium, Serum: 6.5: TEST REPEATED. mmol/L - high    Chloride, Serum: 103 mmol/L    Carbon Dioxide, Serum: 15 mmol/L    Anion Gap, Serum: 15 mmol/L    Blood Urea Nitrogen, Serum: 98 mg/dL - high    Creatinine, Serum: 2.12 mg/dL - high    Glucose, Serum: 134 mg/dL - high    Calcium, Total Serum: 8.5 mg/dL    Protein Total, Serum: 6.4 g/dL    Albumin, Serum: 3.1 g/dL - low    Bilirubin Total, Serum: 0.3 mg/dL    Alkaline Phosphatase, Serum: 54 U/L    Aspartate Aminotransferase (AST/SGOT): 38 U/L    Alanine Aminotransferase (ALT/SGPT): 19 U/L RC  Lactate Dehydrogenase, Serum (02.09.18 @ 02:38)    Lactate Dehydrogenase, Serum: 701 U/L - high  Magnesium, Serum (02.09.18 @ 02:38)    Magnesium, Serum: 2.8 mg/dL - high  Phosphorus Level, Serum (02.09.18 @ 02:38)    Phosphorus Level, Serum: 4.9 mg/dL - high  POCT  Blood Glucose (02.09.18 @ 02:04)    POCT Blood Glucose.: 182 mg/dL    Skin: No pressure ulcers noted.    Estimated Needs:   [ x ] no change since previous assessment  [ ] recalculated:       Previous Nutrition Diagnosis:   Limited adherence to nutrition related recommendations remains, to be addressed with continued diet education.        New Nutrition Diagnosis:  Inadequate Oral Intake related to altered GI function (constipation and nausea) as evidenced by pt consuming ~25% of meals for past 2 days.    Interventions:   1. When medically feasible recommend advance diet to DASH/TLC, no concentrated potassium, no concentrated phosphorus with Ensure Enlive 2 cans/day.  2. Encourage PO intake with all meals.  3. Provide food preferences within therapeutic diet when requested.   4. RD to follow up with diet education as accepted by pt.        Monitoring and Evaluation:     [ x ] PO intake [ x ] Tolerance to diet prescription [ x ] weights [ x ] follow up per protocol    [ ] other:

## 2018-02-09 NOTE — CHART NOTE - NSCHARTNOTEFT_GEN_A_CORE
Called to patient's room where he was found to be pre-syncopal sitting on the toilet. Patient was pale, diaphoretic and with a slightly altered mental status. Patient brought to chair. Vital signs checked (MAP 62), blood glucose checked (), and morning labs drawn including CBC, CMP, PT/PTT/INR, LDH, T&S. Patient reports feeling better after being wheeled out of bathroom back into room. Verbally responsive and able to answer questions. Warm, well perfused. Will continue to monitor mental status, vital signs, morning labs. Dr. Mccabe from [facility] contacted, hydralazine discontinued, will hold coreg for MAP < 70. Will refrain from blood transfusion unless Hgb < 7.0. Patient to be transferred to step down unit. Will maintain heparin and integrillin gtts unless found to be anemic with Hgb < 7.0.

## 2018-02-09 NOTE — CHART NOTE - NSCHARTNOTEFT_GEN_A_CORE
K+ 6.1, nonhemolyzed. Calcium gluconate 1g, Sodium Bicarb 1amp, Regular insulin 10U IVP, Dextrose 50% 1amp ordered to be given before blood transfusion.

## 2018-02-09 NOTE — CONSULT NOTE ADULT - SUBJECTIVE AND OBJECTIVE BOX
Chief Complaint:  Patient is a 67y old  Male who presents with a chief complaint of Elevated LDH levels (2018 19:36)      HPI: 67 male with PMH Chronic Systolic Heart Failure (ACC/AHA Stage D) due to NICMP s/p LVAD 17, GIB s/p Cautery (2017), known AV Malformations with multiple enteroscopies last year, A-Fib, VT s/p AICD. Pt admitted due to elevated LDH level of 646 for further evaluation and observation.  . Pt denies any chest pain, SOB, palpitations, N/D/V, abdominal pain, headaches, changes in vision, dizziness, pain or burning while urinating, swelling, numbness/tingling anywhere, rashes, fever, or recent travel.    Allergies:  No Known Allergies      Home Medications:    Hospital Medications:  acetaminophen   Tablet 650 milliGRAM(s) Oral every 6 hours PRN  ascorbic acid 500 milliGRAM(s) Oral two times a day  carvedilol 6.25 milliGRAM(s) Oral every 12 hours  cefepime  IVPB 1000 milliGRAM(s) IV Intermittent every 8 hours  docusate sodium 100 milliGRAM(s) Oral three times a day  fentaNYL    Injectable 50 MICROGram(s) IV Push once  ferrous    sulfate 325 milliGRAM(s) Oral daily  hydrocortisone 2.5% Cream 1 Application(s) Topical two times a day  mexiletine 150 milliGRAM(s) Oral two times a day  pantoprazole Infusion 8 mG/Hr IV Continuous <Continuous>  polyethylene glycol 3350 17 Gram(s) Oral daily  QUEtiapine 50 milliGRAM(s) Oral at bedtime  senna 2 Tablet(s) Oral at bedtime  vancomycin  IVPB 1000 milliGRAM(s) IV Intermittent <User Schedule>      PMHX/PSHX:  GIB (gastrointestinal bleeding)  H/O prior ablation treatment  Ventricular fibrillation  PAF (paroxysmal atrial fibrillation)  Non-Ischemic Cardiomyopathy  SVT (Supraventricular Tachycardia)  HTN  CHF (Congestive Heart Failure)  LVAD (left ventricular assist device) present  Status post left hip replacement  Hypertension  Hypertension  History of Prior Ablation Treatment  AICD (Automatic Cardioverter/Defibrillator) Present      Family history:  No pertinent family history in first degree relatives      Social History:     ROS:     General:  No wt loss, fevers, chills, night sweats, fatigue,   Eyes:  Good vision, no reported pain  ENT:  No sore throat, pain, runny nose, dysphagia  CV:  No pain, palpitations, hypo/hypertension  Resp:  No dyspnea, cough, tachypnea, wheezing  GI:  See HPI  :  No pain, bleeding, incontinence, nocturia  Muscle:  No pain, weakness  Neuro:  No weakness, tingling, memory problems  Psych:  No fatigue, insomnia, mood problems, depression  Endocrine:  No polyuria, polydipsia, cold/heat intolerance  Heme:  No petechiae, ecchymosis, easy bruisability  Skin:  No rash, edema      PHYSICAL EXAM:     GENERAL:  Appears stated age, well-groomed, well-nourished, no distress  HEENT:  NC/AT,  conjunctivae clear and pink,  no JVD,   CHEST:  Full & symmetric excursion, no increased effort, breath sounds clear  HEART:  Regular rhythm, S1, S2, no murmur/rub/S3/S4, no abdominal bruit, no edema  ABDOMEN:  Soft, non-tender, non-distended, normoactive bowel sounds,  no masses ,  EXTREMITIES:  no cyanosis,clubbing or edema  SKIN:  No rash/erythema/ecchymoses/petechiae/wounds/abscess/warm/dry  NEURO:  Alert, oriented,     Vital Signs:  Vital Signs Last 24 Hrs  T(C): 36.8 (2018 12:00), Max: 36.8 (2018 01:00)  T(F): 98.2 (2018 12:00), Max: 98.2 (2018 01:00)  HR: 93 (2018 12:00) (78 - 102)  BP: 79/58 (2018 11:00) (79/58 - 93/58)  BP(mean): 63 (2018 11:00) (62 - 80)  RR: 25 (2018 12:00) (13 - 32)  SpO2: 95% (2018 12:00) (95% - 100%)  Daily     Daily     LABS:                        7.0    22.4  )-----------( 215      ( 2018 02:38 )             21.4     02-09    x   |  x   |  x   ----------------------------<  x   5.5<H>   |  x   |  x     Ca    8.5      2018 04:09  Phos  4.9     02-09  Mg     2.8     02-09    TPro  6.4  /  Alb  3.1<L>  /  TBili  0.3  /  DBili  x   /  AST  38  /  ALT  19  /  AlkPhos  54  02-09    LIVER FUNCTIONS - ( 2018 04:09 )  Alb: 3.1 g/dL / Pro: 6.4 g/dL / ALK PHOS: 54 U/L / ALT: 19 U/L RC / AST: 38 U/L / GGT: x           PT/INR - ( 2018 02:38 )   PT: 23.0 sec;   INR: 2.08 ratio         PTT - ( 2018 02:38 )  PTT:73.9 sec  Urinalysis Basic - ( 2018 22:20 )    Color: Yellow / Appearance: Clear / S.018 / pH: x  Gluc: x / Ketone: Negative  / Bili: Negative / Urobili: Negative   Blood: x / Protein: Negative / Nitrite: Negative   Leuk Esterase: Negative / RBC: x / WBC x   Sq Epi: x / Non Sq Epi: x / Bacteria: x          Imaging: Chief Complaint:  Patient is a 67y old  Male who presents with a chief complaint of Elevated LDH levels (2018 19:36)      HPI: 67 male with PMH Chronic Systolic Heart Failure (ACC/AHA Stage D) due to NICMP s/p LVAD 17, GIB s/p Cautery (2017), known AV Malformations with multiple enteroscopies last year, A-Fib, VT s/p AICD. Pt admitted due to elevated LDH level of 646 for further evaluation and observation. Since admission, Hgb slowly downtrending, received 2U PRBC only today. Patient reports feeling well. No abdominal pain, nausea/vomiting. His last BM was 3 days ago, and he has not seen any melena or hematochezia recently. His last enterosocpy was 2017. He reports having had colonoscopy within 1-2 years, but it was not done here and results unknown. He had a capsule endoscopy also 2017- there was blood seen in the cecum, but colonoscopy was deferred as bleeding/anemia resolved. He takes no NSAIDs at home.    Allergies:  No Known Allergies      Hospital Medications:  acetaminophen   Tablet 650 milliGRAM(s) Oral every 6 hours PRN  ascorbic acid 500 milliGRAM(s) Oral two times a day  carvedilol 6.25 milliGRAM(s) Oral every 12 hours  cefepime  IVPB 1000 milliGRAM(s) IV Intermittent every 8 hours  docusate sodium 100 milliGRAM(s) Oral three times a day  fentaNYL    Injectable 50 MICROGram(s) IV Push once  ferrous    sulfate 325 milliGRAM(s) Oral daily  hydrocortisone 2.5% Cream 1 Application(s) Topical two times a day  mexiletine 150 milliGRAM(s) Oral two times a day  pantoprazole Infusion 8 mG/Hr IV Continuous <Continuous>  polyethylene glycol 3350 17 Gram(s) Oral daily  QUEtiapine 50 milliGRAM(s) Oral at bedtime  senna 2 Tablet(s) Oral at bedtime  vancomycin  IVPB 1000 milliGRAM(s) IV Intermittent <User Schedule>      PMHX/PSHX:  GIB (gastrointestinal bleeding)  H/O prior ablation treatment  Ventricular fibrillation  PAF (paroxysmal atrial fibrillation)  Non-Ischemic Cardiomyopathy  SVT (Supraventricular Tachycardia)  HTN  CHF (Congestive Heart Failure)  LVAD (left ventricular assist device) present  Status post left hip replacement  Hypertension  Hypertension  History of Prior Ablation Treatment  AICD (Automatic Cardioverter/Defibrillator) Present      Family history:  No pertinent family history in first degree relatives      Social History: No ETOH, smoking or illicit drugs    ROS:     General:  No wt loss, fevers, chills, night sweats, fatigue,   Eyes:  Good vision, no reported pain  ENT:  No sore throat, pain, runny nose, dysphagia  CV:  No pain, palpitations, hypo/hypertension  Resp:  No dyspnea, cough, tachypnea, wheezing  GI:  See HPI  :  No pain, bleeding, incontinence, nocturia  Muscle:  No pain, weakness  Neuro:  No weakness, tingling, memory problems  Psych:  No fatigue, insomnia, mood problems, depression  Endocrine:  No polyuria, polydipsia, cold/heat intolerance  Heme:  No petechiae, ecchymosis, easy bruisability  Skin:  No rash, edema      PHYSICAL EXAM:     GENERAL:  Appears stated age, well-groomed, well-nourished, no distress  HEENT:  NC/AT,  conjunctivae clear and pink,  no JVD,   CHEST:  Full & symmetric excursion, no increased effort, breath sounds clear  HEART:  Regular rhythm, S1, S2, no murmur, mechanical hum present  ABDOMEN:  Soft, non-tender, non-distended, normoactive bowel sounds,  no masses  RECTAL: soft BROWN stool, no masses, NO melena, NO blood  EXTREMITIES:  no cyanosis,clubbing or edema  SKIN:  No rash/erythema  NEURO:  Alert, oriented x 3    Vital Signs:  Vital Signs Last 24 Hrs  T(C): 36.8 (2018 12:00), Max: 36.8 (2018 01:00)  T(F): 98.2 (2018 12:00), Max: 98.2 (2018 01:00)  HR: 93 (2018 12:00) (78 - 102)  BP: 79/58 (2018 11:00) (79/58 - 93/58)  BP(mean): 63 (2018 11:00) (62 - 80)  RR: 25 (2018 12:00) (13 - 32)  SpO2: 95% (2018 12:00) (95% - 100%)  Daily     Daily     LABS:                        7.0    22.4  )-----------( 215      ( 2018 02:38 )             21.4     Mean Cell Volume: 93.1 fl (18 @ 12:22)    Lactate Dehydrogenase, Serum: 701 U/L (18 @ 02:38)            x   |  x   |  x   ----------------------------<  x   5.5<H>   |  x   |  x     Ca    8.5      2018 04:09  Phos  4.9       Mg     2.8         TPro  6.4  /  Alb  3.1<L>  /  TBili  0.3  /  DBili  x   /  AST  38  /  ALT  19  /  AlkPhos  54      LIVER FUNCTIONS - ( 2018 04:09 )  Alb: 3.1 g/dL / Pro: 6.4 g/dL / ALK PHOS: 54 U/L / ALT: 19 U/L RC / AST: 38 U/L / GGT: x           PT/INR - ( 2018 02:38 )   PT: 23.0 sec;   INR: 2.08 ratio         PTT - ( 2018 02:38 )  PTT:73.9 sec  Urinalysis Basic - ( 2018 22:20 )    Color: Yellow / Appearance: Clear / S.018 / pH: x  Gluc: x / Ketone: Negative  / Bili: Negative / Urobili: Negative   Blood: x / Protein: Negative / Nitrite: Negative   Leuk Esterase: Negative / RBC: x / WBC x   Sq Epi: x / Non Sq Epi: x / Bacteria: x          Imaging:  < from: Enteroscopy (17 @ 13:38) >  Findings:       The examined esophagus was normal.       The Z-line was regular and was found 37 cm from the incisors.       The entire examined stomach was normal.       A 1 cm subepithelail nodule was found at 4th part of the duodenum.       The exam of the duodenum was otherwise normal.       The examined jejunum was normal to the proximal-mid jejunum..                                                                                                        Impression:          - Normal esophagus.                       - Z-line regular, 37 cm from the incisors.                       - Normal stomach.                       - Small 1 cm subepithelial nodule found in the duodenum.                       - Normal examined jejunum to the proximal-mid jejunum.                       - No specimens collected.  Recommendation:      - Return patient to ICU for ongoing care.                       - Advance diet as tolerated today. NPO past MN tonight. .                       - To visualize the small bowel, perform video capsule endoscopy tomorrow. NPO                        past MN tonight.            - Monitor Hgb and bowel movements.    < end of copied text >

## 2018-02-09 NOTE — PROGRESS NOTE ADULT - ATTENDING COMMENTS
Overnight issues include worsening leukocytosis, drop in Hgb, and acute kidney injury.  Episode of pre-syncope/vasovagal while straining on the commode.  He remains completely asymptomatic currently.  Given 2u pRBCs overnight.    Afebrile, comfortable this morning. Awaiting push enteroscopy by GI.    . MAP 67-85. INR 2.08.  VAD Interrogation without power spikes or events. No changes made.    Off integrilin, heparin, ASA, and warfarin.  UNOS Status 7 until we sort out the infectious issues. Please send repeat blood cultures and do CXR. Per Dr. Lujan, empiric Abx today.  Reduce Coreg to 12.5 mg BID empirically given multiple issues. Hold for MAP < 70.  Consult Cardio-Renal.

## 2018-02-09 NOTE — CONSULT NOTE ADULT - ASSESSMENT
Impression:  1. Anemia without over bleeding - differential includes hemolysis given elevated LDH, also on differential is occult bleeding from angioectasias given previous history of this with LVAD,  2. NICM s/p LVAD  3. Multiple episodes of GI bleeding in past, due to angioectasias thaat were clipped    Recommend:  -Keep NPO  -Would obtain hemolysis workup - check haptoglobin with blood sample PRIOR to transfusion  -Will plan for push enteroscopy today in [facility] given history of bleeding  -If negative, will consider repeat capsule endoscopy/colonoscopy  -Risks and benefits of endoscopy/capsule endoscopy including bleeding/infection/missed lesions/perforation/capsule retention requiring endoscopy or surgical removal explained - patient agreeable to procedure

## 2018-02-09 NOTE — PROGRESS NOTE ADULT - PROBLEM SELECTOR PLAN 1
Change to Coreg 12.5 BID and hold for MAPs < 70 & cont to hold Hydralazine  Off lasix and aldactone given increased Cr without volume overload  Transplant Listed for OHT, blood type B, UNOS 7 status at current; No crossmatch needed (cPRA 0%)  No recent VT, on Mexiletine 150mg BID   ID consult appreciated receiving vaccinations Change to Coreg 12.5 BID and hold for MAPs < 70 & cont to hold Hydralazine  Off lasix and aldactone given increased Cr without volume overload  Transplant Listed for OHT, blood type B, UNOS 7 status at current; No crossmatch needed (cPRA 0%)  No recent VT, on Mexiletine 150mg BID

## 2018-02-10 LAB
ALBUMIN SERPL ELPH-MCNC: 3.1 G/DL — LOW (ref 3.3–5)
ALP SERPL-CCNC: 39 U/L — LOW (ref 40–120)
ALT FLD-CCNC: 14 U/L RC — SIGNIFICANT CHANGE UP (ref 10–45)
ANION GAP SERPL CALC-SCNC: 14 MMOL/L — SIGNIFICANT CHANGE UP (ref 5–17)
APTT BLD: 30.9 SEC — SIGNIFICANT CHANGE UP (ref 27.5–37.4)
AST SERPL-CCNC: 27 U/L — SIGNIFICANT CHANGE UP (ref 10–40)
BILIRUB SERPL-MCNC: 0.4 MG/DL — SIGNIFICANT CHANGE UP (ref 0.2–1.2)
BUN SERPL-MCNC: 100 MG/DL — HIGH (ref 7–23)
CALCIUM SERPL-MCNC: 8 MG/DL — LOW (ref 8.4–10.5)
CHLORIDE SERPL-SCNC: 105 MMOL/L — SIGNIFICANT CHANGE UP (ref 96–108)
CO2 SERPL-SCNC: 19 MMOL/L — LOW (ref 22–31)
CREAT SERPL-MCNC: 2 MG/DL — HIGH (ref 0.5–1.3)
GAS PNL BLDA: SIGNIFICANT CHANGE UP
GLUCOSE BLDC GLUCOMTR-MCNC: 117 MG/DL — HIGH (ref 70–99)
GLUCOSE SERPL-MCNC: 139 MG/DL — HIGH (ref 70–99)
HAPTOGLOB SERPL-MCNC: <20 MG/DL — LOW (ref 34–200)
HCT VFR BLD CALC: 18.4 % — CRITICAL LOW (ref 39–50)
HGB BLD-MCNC: 6.3 G/DL — CRITICAL LOW (ref 13–17)
INR BLD: 2.2 RATIO — HIGH (ref 0.88–1.16)
LDH SERPL L TO P-CCNC: 492 U/L — HIGH (ref 50–242)
MCHC RBC-ENTMCNC: 32.1 PG — SIGNIFICANT CHANGE UP (ref 27–34)
MCHC RBC-ENTMCNC: 34.4 GM/DL — SIGNIFICANT CHANGE UP (ref 32–36)
MCV RBC AUTO: 93.3 FL — SIGNIFICANT CHANGE UP (ref 80–100)
PLATELET # BLD AUTO: 141 K/UL — LOW (ref 150–400)
POTASSIUM SERPL-MCNC: 5 MMOL/L — SIGNIFICANT CHANGE UP (ref 3.5–5.3)
POTASSIUM SERPL-SCNC: 5 MMOL/L — SIGNIFICANT CHANGE UP (ref 3.5–5.3)
PROT SERPL-MCNC: 5.6 G/DL — LOW (ref 6–8.3)
PROTHROM AB SERPL-ACNC: 24.2 SEC — HIGH (ref 9.8–12.7)
RBC # BLD: 1.97 M/UL — LOW (ref 4.2–5.8)
RBC # FLD: 16.3 % — HIGH (ref 10.3–14.5)
SODIUM SERPL-SCNC: 138 MMOL/L — SIGNIFICANT CHANGE UP (ref 135–145)
WBC # BLD: 20.9 K/UL — HIGH (ref 3.8–10.5)
WBC # FLD AUTO: 20.9 K/UL — HIGH (ref 3.8–10.5)

## 2018-02-10 PROCEDURE — 99232 SBSQ HOSP IP/OBS MODERATE 35: CPT

## 2018-02-10 PROCEDURE — 99233 SBSQ HOSP IP/OBS HIGH 50: CPT | Mod: 25

## 2018-02-10 PROCEDURE — 99233 SBSQ HOSP IP/OBS HIGH 50: CPT | Mod: GC

## 2018-02-10 PROCEDURE — 93750 INTERROGATION VAD IN PERSON: CPT

## 2018-02-10 PROCEDURE — 71045 X-RAY EXAM CHEST 1 VIEW: CPT | Mod: 26

## 2018-02-10 RX ORDER — FENTANYL CITRATE 50 UG/ML
25 INJECTION INTRAVENOUS ONCE
Qty: 0 | Refills: 0 | Status: DISCONTINUED | OUTPATIENT
Start: 2018-02-10 | End: 2018-02-10

## 2018-02-10 RX ORDER — ALBUMIN HUMAN 25 %
250 VIAL (ML) INTRAVENOUS ONCE
Qty: 0 | Refills: 0 | Status: COMPLETED | OUTPATIENT
Start: 2018-02-10 | End: 2018-02-09

## 2018-02-10 RX ORDER — METOPROLOL TARTRATE 50 MG
25 TABLET ORAL
Qty: 0 | Refills: 0 | Status: DISCONTINUED | OUTPATIENT
Start: 2018-02-10 | End: 2018-02-11

## 2018-02-10 RX ADMIN — FENTANYL CITRATE 25 MICROGRAM(S): 50 INJECTION INTRAVENOUS at 00:30

## 2018-02-10 RX ADMIN — Medication 500 MILLIGRAM(S): at 18:54

## 2018-02-10 RX ADMIN — QUETIAPINE FUMARATE 50 MILLIGRAM(S): 200 TABLET, FILM COATED ORAL at 21:16

## 2018-02-10 RX ADMIN — Medication 1 APPLICATION(S): at 06:01

## 2018-02-10 RX ADMIN — CEFEPIME 100 MILLIGRAM(S): 1 INJECTION, POWDER, FOR SOLUTION INTRAMUSCULAR; INTRAVENOUS at 02:06

## 2018-02-10 RX ADMIN — Medication 100 MILLIGRAM(S): at 21:15

## 2018-02-10 RX ADMIN — CEFEPIME 100 MILLIGRAM(S): 1 INJECTION, POWDER, FOR SOLUTION INTRAMUSCULAR; INTRAVENOUS at 06:01

## 2018-02-10 RX ADMIN — SENNA PLUS 2 TABLET(S): 8.6 TABLET ORAL at 21:16

## 2018-02-10 RX ADMIN — CEFEPIME 100 MILLIGRAM(S): 1 INJECTION, POWDER, FOR SOLUTION INTRAMUSCULAR; INTRAVENOUS at 13:48

## 2018-02-10 RX ADMIN — FENTANYL CITRATE 25 MICROGRAM(S): 50 INJECTION INTRAVENOUS at 00:45

## 2018-02-10 RX ADMIN — CEFEPIME 100 MILLIGRAM(S): 1 INJECTION, POWDER, FOR SOLUTION INTRAMUSCULAR; INTRAVENOUS at 21:16

## 2018-02-10 RX ADMIN — MEXILETINE HYDROCHLORIDE 150 MILLIGRAM(S): 150 CAPSULE ORAL at 18:54

## 2018-02-10 RX ADMIN — PANTOPRAZOLE SODIUM 10 MG/HR: 20 TABLET, DELAYED RELEASE ORAL at 21:16

## 2018-02-10 RX ADMIN — Medication 1 APPLICATION(S): at 18:54

## 2018-02-10 RX ADMIN — Medication 325 MILLIGRAM(S): at 12:13

## 2018-02-10 RX ADMIN — Medication 500 MILLIGRAM(S): at 06:01

## 2018-02-10 RX ADMIN — MEXILETINE HYDROCHLORIDE 150 MILLIGRAM(S): 150 CAPSULE ORAL at 06:01

## 2018-02-10 RX ADMIN — Medication 25 MILLIGRAM(S): at 18:54

## 2018-02-10 NOTE — PROGRESS NOTE ADULT - PROBLEM SELECTOR PLAN 1
In setting of obstructive uropathy: Scr. was elevated at 1.43 on 2/1/18 which then worsened to 2.11 on 2/9/18. Rosas was placed and drained about 695 cc. Patient with NORMAN secondary to obstructive uropathy. Serum Cr. is elevated but stable at 2 today. Continue to monitor Scr., electrolytes, and urine output. Avoid nephrotoxins.

## 2018-02-10 NOTE — PROGRESS NOTE ADULT - ATTENDING COMMENTS
Leukocytosis is improving on cefepime, no obvious source of infection. Appreciate input from Transplant ID.  He continues to bleed. Would consider bleeding scan and IR intervention if any source seen. GI was planning capsule endoscopy, but unlikely to have data in a timely fashion with a subsequent intervention. Every unit of blood increases risk of sensitization and reduces transplant options.    LDH improving, now at 492, remains off AC. MAP 72-88. INR 2.2. Lactate 0.8.  Renal impairment may be from hypovolemia and uremia possibly due to GIB?  Given episodes of hypotension, would switch the Coreg to Toprol XL 25 mg po BID (prior VT).  Tele with NSR, frequent PVCs.    UNOS Status 7 until we sort out the leukocytosis hopefully by Monday.

## 2018-02-10 NOTE — PROGRESS NOTE ADULT - ATTENDING COMMENTS
NORMAN and hyperkalemia from urinary retention.    No suspicion for pigment nephropathy from pump thrombosis at this time as there is no heme in the urine and bili is not elevated.    Good urine output and monitor renal function  K improved with diaz and medical management

## 2018-02-10 NOTE — PROGRESS NOTE ADULT - ASSESSMENT
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 He was resumed on coumadin but remained subtherapeutic and due to inability to use Lovenox given his history of GIBs he was admitted and started on a heparin gtt for likely pump thrombosis. LDH continued to rise prompting addition of integrilin, but he had a subsequent drop in Hgb with confirmed blood in GI seen on push enteroscopy without active bleeding evident. Now s/p 4u pRBCs.

## 2018-02-10 NOTE — PROGRESS NOTE ADULT - SUBJECTIVE AND OBJECTIVE BOX
INFECTIOUS DISEASES FOLLOW UP--Edil Ng MD  Pager [phone]    This is a follow up note for this  67y Male with  LVAD.  GI Bleed  Elevated WBC count  Non toxic at present and for now the BC are negative.    Further ROS:  RESPIRATORY:  No dyspnea  GASTROINTESTINAL:  GIB  GENITOURINARY:  No dysuria  NEUROLOGIC:  No headache,     Allergies  No Known Allergies    ANTIBIOTICS/RELEVANT:  antimicrobials  cefepime  IVPB 1000 milliGRAM(s) IV Intermittent every 8 hours  vancomycin  IVPB 1000 milliGRAM(s) IV Intermittent <User Schedule>    immunologic:    OTHER:  acetaminophen   Tablet 650 milliGRAM(s) Oral every 6 hours PRN  ascorbic acid 500 milliGRAM(s) Oral two times a day  carvedilol 6.25 milliGRAM(s) Oral every 12 hours  docusate sodium 100 milliGRAM(s) Oral three times a day  ferrous    sulfate 325 milliGRAM(s) Oral daily  hydrocortisone 2.5% Cream 1 Application(s) Topical two times a day  mexiletine 150 milliGRAM(s) Oral two times a day  pantoprazole Infusion 8 mG/Hr IV Continuous <Continuous>  polyethylene glycol 3350 17 Gram(s) Oral daily  QUEtiapine 50 milliGRAM(s) Oral at bedtime  senna 2 Tablet(s) Oral at bedtime  vasopressin Infusion 0.033 Unit(s)/Min IV Continuous <Continuous>      Objective:  Vital Signs Last 24 Hrs  T(C): 36.3 (10 Feb 2018 04:00), Max: 37 (2018 18:00)  T(F): 97.3 (10 Feb 2018 04:00), Max: 98.6 (2018 18:00)  HR: 92 (10 Feb 2018 07:00) (81 - 111)  BP: 79/58 (2018 11:00) (79/58 - 86/63)  BP(mean): 63 (2018 11:00) (63 - 80)  RR: 20 (10 Feb 2018 07:00) (11 - 33)  SpO2: 100% (10 Feb 2018 07:00) (94% - 100%)    PHYSICAL EXAM:  Constitutional:no acute distress--alert---frail  Eyes:WALT, EOMI  Ear/Nose/Throat: no oral lesions, 	  Respiratory: clear BL  Cardiovascular: S1S2  Gastrointestinal:soft, (+) BS, no tenderness  Extremities:no e/e/c  No Lymphadenopathy  IV sites not inflammed.    LABS:                        6.3    20.9  )-----------( 141      ( 10 Feb 2018 01:44 )             18.4     02-10    138  |  105  |  100<H>  ----------------------------<  139<H>  5.0   |  19<L>  |  2.00<H>    Ca    8.0<L>      10 Feb 2018 01:44  Phos  4.9       Mg     2.8         TPro  5.6<L>  /  Alb  3.1<L>  /  TBili  0.4  /  DBili  x   /  AST  27  /  ALT  14  /  AlkPhos  39<L>  02-10    PT/INR - ( 10 Feb 2018 01:44 )   PT: 24.2 sec;   INR: 2.20 ratio         PTT - ( 10 Feb 2018 01:44 )  PTT:30.9 sec  Urinalysis Basic - ( 2018 12:27 )    Color: Yellow / Appearance: Clear / S.023 / pH: x  Gluc: x / Ketone: Negative  / Bili: Negative / Urobili: Negative   Blood: x / Protein: Negative / Nitrite: Negative   Leuk Esterase: Negative / RBC: 0-2 /HPF / WBC 0-2 /HPF   Sq Epi: x / Non Sq Epi: OCC /HPF / Bacteria: x    MICROBIOLOGY:  BC from yesterday neg to data.    Imp/Rx:  No fevers  BC negative right now.  Doubt active sepsis.    Let's DC the vanco at this point....Obviously, if there is growth of gram pos in BC can restart.  Can continue the cefepime for now.    ID [id number] if any further questions thru weekend.

## 2018-02-10 NOTE — PROGRESS NOTE ADULT - SUBJECTIVE AND OBJECTIVE BOX
Hgb dropped again overnight requiring 2u pRBCs, last one 2/10 03:16. Briefly on vasopressin last night.  He complaints of abdominal fullness and feeling like he has to pass a BM.  Afebrile, nothing growing in blood cultures thus far, and leukocytosis improving with cefepime.    Medications:  acetaminophen   Tablet 650 milliGRAM(s) Oral every 6 hours PRN  ascorbic acid 500 milliGRAM(s) Oral two times a day  carvedilol 6.25 milliGRAM(s) Oral every 12 hours  cefepime  IVPB 1000 milliGRAM(s) IV Intermittent every 8 hours  docusate sodium 100 milliGRAM(s) Oral three times a day  ferrous    sulfate 325 milliGRAM(s) Oral daily  hydrocortisone 2.5% Cream 1 Application(s) Topical two times a day  mexiletine 150 milliGRAM(s) Oral two times a day  pantoprazole Infusion 8 mG/Hr IV Continuous <Continuous>  polyethylene glycol 3350 17 Gram(s) Oral daily  QUEtiapine 50 milliGRAM(s) Oral at bedtime  senna 2 Tablet(s) Oral at bedtime      Vitals:  T(C): 36.7 (02-10-18 @ 08:00), Max: 37 (18 @ 18:00)  HR: 89 (02-10-18 @ 10:00) (81 - 111)  BP: 79/58 (18 @ 11:00) (79/58 - 79/58)  BP(mean): 63 (18 @ 11:00) (63 - 63)  ABP: 104/95 (02-10-18 @ 10:00) (68/47 - 125/83)  ABP(mean): 99 (02-10-18 @ 10:00) (56 - 102)  RR: 14 (02-10-18 @ 10:00) (11 - 33)  SpO2: 100% (02-10-18 @ 10:00) (94% - 100%)    Vital Signs Last 24 Hrs  T(C): 36.7 (10 Feb 2018 08:00), Max: 37 (2018 18:00)  T(F): 98 (10 Feb 2018 08:00), Max: 98.6 (2018 18:00)  HR: 89 (10 Feb 2018 10:00) (81 - 111)  BP: 79/58 (2018 11:00) (79/58 - 79/58)  BP(mean): 63 (2018 11:00) (63 - 63)  RR: 14 (10 Feb 2018 10:00) (11 - 33)  SpO2: 100% (10 Feb 2018 10:00) (94% - 100%)    Daily     Daily Weight in k.4 (10 Feb 2018 00:00)    I&O's Summary    2018 07:01  -  10 Feb 2018 07:00  --------------------------------------------------------  IN: 2269 mL / OUT: 2850 mL / NET: -581 mL    10 Feb 2018 07:01  -  10 Feb 2018 10:44  --------------------------------------------------------  IN: 30 mL / OUT: 400 mL / NET: -370 mL        Physical Exam:     General: No distress. Comfortable.  Neck: Neck supple.   Chest: Clear to auscultation bilaterally  CV: Typical LVAD sounds. No distal pulses  Abdomen: Soft, non-distended, non-tender  Skin: No rashes or skin breakdown, warm and well perfused  Neurology: Alert and oriented times three. Sensation intact  Psych: Affect normal    LVAD Interrogation: Heart Mate II  Speed: 9200  Power: 5.6-5.8 raymond  Flow: 5.3-5.6 lpm  PI: 6.0-7.3  Event: no events or alarms  No programming changes were made    Labs:                        6.3    20.9  )-----------( 141      ( 10 Feb 2018 01:44 )             18.4     02-10    138  |  105  |  100<H>  ----------------------------<  139<H>  5.0   |  19<L>  |  2.00<H>    Ca    8.0<L>      10 Feb 2018 01:44  Phos  4.9       Mg     2.8         TPro  5.6<L>  /  Alb  3.1<L>  /  TBili  0.4  /  DBili  x   /  AST  27  /  ALT  14  /  AlkPhos  39<L>  02-10    PT/INR - ( 10 Feb 2018 01:44 )   PT: 24.2 sec;   INR: 2.20 ratio    PTT - ( 10 Feb 2018 01:44 )  PTT:30.9 sec      Serum Pro-Brain Natriuretic Peptide: 163 pg/mL ( @ 12:22)      Lactate Dehydrogenase, Serum: 492 U/L (02-10 @ 01:44)  Lactate Dehydrogenase, Serum: 612 U/L ( @ 12:22)  Lactate Dehydrogenase, Serum: 701 U/L ( @ 02:38)  Lactate Dehydrogenase, Serum: 794 U/L ( @ 05:28)  Lactate Dehydrogenase, Serum: 963 U/L ( @ 15:57)

## 2018-02-10 NOTE — PROGRESS NOTE ADULT - SUBJECTIVE AND OBJECTIVE BOX
[facility] Division of Kidney Diseases & Hypertension  FOLLOW UP NOTE  [phone]--------------------------------------------------------------------------------  Chief Complaint:Heart replaced by heart assist device    No acute events noted.  Patient denies complains of SOB, CP, abdominal pain  Making good UOP.     PAST HISTORY  --------------------------------------------------------------------------------  No significant changes to PMH, PSH, FHx, SHx, unless otherwise noted    ALLERGIES & MEDICATIONS  --------------------------------------------------------------------------------  Allergies    No Known Allergies    Intolerances      Standing Inpatient Medications  ascorbic acid 500 milliGRAM(s) Oral two times a day  carvedilol 6.25 milliGRAM(s) Oral every 12 hours  cefepime  IVPB 1000 milliGRAM(s) IV Intermittent every 8 hours  docusate sodium 100 milliGRAM(s) Oral three times a day  ferrous    sulfate 325 milliGRAM(s) Oral daily  hydrocortisone 2.5% Cream 1 Application(s) Topical two times a day  mexiletine 150 milliGRAM(s) Oral two times a day  pantoprazole Infusion 8 mG/Hr IV Continuous <Continuous>  polyethylene glycol 3350 17 Gram(s) Oral daily  QUEtiapine 50 milliGRAM(s) Oral at bedtime  senna 2 Tablet(s) Oral at bedtime  vasopressin Infusion 0.033 Unit(s)/Min IV Continuous <Continuous>    PRN Inpatient Medications  acetaminophen   Tablet 650 milliGRAM(s) Oral every 6 hours PRN      REVIEW OF SYSTEMS  --------------------------------------------------------------------------------  Gen: No  fevers/chills  Respiratory: No dyspnea  CV: No chest pain  GI: No abdominal pain,nausea, vomiting  MK: no edema  Neuro: No dizziness/lightheadedness      All other systems were reviewed and are negative, except as noted.    VITALS/PHYSICAL EXAM  --------------------------------------------------------------------------------  T(C): 36.7 (02-10-18 @ 08:00), Max: 37 (02-09-18 @ 18:00)  HR: 89 (02-10-18 @ 10:00) (81 - 111)  BP: 79/58 (02-09-18 @ 11:00) (79/58 - 86/63)  RR: 14 (02-10-18 @ 10:00) (11 - 33)  SpO2: 100% (02-10-18 @ 10:00) (94% - 100%)  Wt(kg): --        02-09-18 @ 07:01  -  02-10-18 @ 07:00  --------------------------------------------------------  IN: 2269 mL / OUT: 2850 mL / NET: -581 mL    02-10-18 @ 07:01  -  02-10-18 @ 10:05  --------------------------------------------------------  IN: 20 mL / OUT: 275 mL / NET: -255 mL      Physical Exam:  	Gen: Patient intubated, on mechanical ventilator   	HEENT: no JVD  	Pulm: CTA B/L  	CV:  S1S2  	Abd: +BS, soft              : diaz present.   	Ext: No B/L Lower ext edema  	Neuro: No focal deficits  	Skin: Warm and dry     LABS/STUDIES  --------------------------------------------------------------------------------              6.3    20.9  >-----------<  141      [02-10-18 @ 01:44]              18.4     138  |  105  |  100  ----------------------------<  139      [02-10-18 @ 01:44]  5.0   |  19  |  2.00        Ca     8.0     [02-10-18 @ 01:44]      Mg     2.8     [02-09-18 @ 02:38]      Phos  4.9     [02-09-18 @ 02:38]    TPro  5.6  /  Alb  3.1  /  TBili  0.4  /  DBili  x   /  AST  27  /  ALT  14  /  AlkPhos  39  [02-10-18 @ 01:44]    PT/INR: PT 24.2 , INR 2.20       [02-10-18 @ 01:44]  PTT: 30.9       [02-10-18 @ 01:44]          [02-10-18 @ 01:44]    Creatinine Trend:  SCr 2.00 [02-10 @ 01:44]  SCr 2.10 [02-09 @ 12:22]  SCr 2.12 [02-09 @ 04:09]  SCr 2.11 [02-09 @ 02:38]  SCr 1.66 [02-08 @ 05:28]    Urinalysis - [02-09-18 @ 12:27]      Color Yellow / Appearance Clear / SG 1.023 / pH 5.5      Gluc Negative / Ketone Negative  / Bili Negative / Urobili Negative       Blood Negative / Protein Negative / Leuk Est Negative / Nitrite Negative      RBC 0-2 / WBC 0-2 / Hyaline 5-10 / Gran  / Sq Epi  / Non Sq Epi OCC / Bacteria

## 2018-02-11 LAB
ALBUMIN SERPL ELPH-MCNC: 2.6 G/DL — LOW (ref 3.3–5)
ALP SERPL-CCNC: 37 U/L — LOW (ref 40–120)
ALT FLD-CCNC: 12 U/L RC — SIGNIFICANT CHANGE UP (ref 10–45)
ANION GAP SERPL CALC-SCNC: 11 MMOL/L — SIGNIFICANT CHANGE UP (ref 5–17)
APTT BLD: 30.2 SEC — SIGNIFICANT CHANGE UP (ref 27.5–37.4)
AST SERPL-CCNC: 23 U/L — SIGNIFICANT CHANGE UP (ref 10–40)
BILIRUB SERPL-MCNC: 0.3 MG/DL — SIGNIFICANT CHANGE UP (ref 0.2–1.2)
BUN SERPL-MCNC: 80 MG/DL — HIGH (ref 7–23)
CALCIUM SERPL-MCNC: 7.9 MG/DL — LOW (ref 8.4–10.5)
CHLORIDE SERPL-SCNC: 114 MMOL/L — HIGH (ref 96–108)
CO2 SERPL-SCNC: 19 MMOL/L — LOW (ref 22–31)
CREAT SERPL-MCNC: 1.71 MG/DL — HIGH (ref 0.5–1.3)
GAS PNL BLDA: SIGNIFICANT CHANGE UP
GLUCOSE SERPL-MCNC: 135 MG/DL — HIGH (ref 70–99)
HAPTOGLOB SERPL-MCNC: <20 MG/DL — LOW (ref 34–200)
HCT VFR BLD CALC: 18.3 % — CRITICAL LOW (ref 39–50)
HCT VFR BLD CALC: 22.5 % — LOW (ref 39–50)
HCT VFR BLD CALC: 23.2 % — LOW (ref 39–50)
HCT VFR BLD CALC: 23.3 % — LOW (ref 39–50)
HGB BLD-MCNC: 6.5 G/DL — CRITICAL LOW (ref 13–17)
HGB BLD-MCNC: 7.7 G/DL — LOW (ref 13–17)
HGB BLD-MCNC: 8.2 G/DL — LOW (ref 13–17)
HGB BLD-MCNC: 8.4 G/DL — LOW (ref 13–17)
INR BLD: 1.98 RATIO — HIGH (ref 0.88–1.16)
LDH SERPL L TO P-CCNC: 397 U/L — HIGH (ref 50–242)
MAGNESIUM SERPL-MCNC: 2.6 MG/DL — SIGNIFICANT CHANGE UP (ref 1.6–2.6)
MCHC RBC-ENTMCNC: 31.8 PG — SIGNIFICANT CHANGE UP (ref 27–34)
MCHC RBC-ENTMCNC: 33 PG — SIGNIFICANT CHANGE UP (ref 27–34)
MCHC RBC-ENTMCNC: 33.1 PG — SIGNIFICANT CHANGE UP (ref 27–34)
MCHC RBC-ENTMCNC: 33.6 PG — SIGNIFICANT CHANGE UP (ref 27–34)
MCHC RBC-ENTMCNC: 34.4 GM/DL — SIGNIFICANT CHANGE UP (ref 32–36)
MCHC RBC-ENTMCNC: 35.3 GM/DL — SIGNIFICANT CHANGE UP (ref 32–36)
MCHC RBC-ENTMCNC: 35.4 GM/DL — SIGNIFICANT CHANGE UP (ref 32–36)
MCHC RBC-ENTMCNC: 36 GM/DL — SIGNIFICANT CHANGE UP (ref 32–36)
MCV RBC AUTO: 92.6 FL — SIGNIFICANT CHANGE UP (ref 80–100)
MCV RBC AUTO: 93.4 FL — SIGNIFICANT CHANGE UP (ref 80–100)
MCV RBC AUTO: 93.4 FL — SIGNIFICANT CHANGE UP (ref 80–100)
MCV RBC AUTO: 93.8 FL — SIGNIFICANT CHANGE UP (ref 80–100)
PHOSPHATE SERPL-MCNC: 2.9 MG/DL — SIGNIFICANT CHANGE UP (ref 2.5–4.5)
PLATELET # BLD AUTO: 101 K/UL — LOW (ref 150–400)
PLATELET # BLD AUTO: 111 K/UL — LOW (ref 150–400)
PLATELET # BLD AUTO: 133 K/UL — LOW (ref 150–400)
PLATELET # BLD AUTO: 134 K/UL — LOW (ref 150–400)
POTASSIUM SERPL-MCNC: 4.9 MMOL/L — SIGNIFICANT CHANGE UP (ref 3.5–5.3)
POTASSIUM SERPL-SCNC: 4.9 MMOL/L — SIGNIFICANT CHANGE UP (ref 3.5–5.3)
PROT SERPL-MCNC: 5 G/DL — LOW (ref 6–8.3)
PROTHROM AB SERPL-ACNC: 21.9 SEC — HIGH (ref 9.8–12.7)
RBC # BLD: 1.96 M/UL — LOW (ref 4.2–5.8)
RBC # BLD: 2.43 M/UL — LOW (ref 4.2–5.8)
RBC # BLD: 2.48 M/UL — LOW (ref 4.2–5.8)
RBC # BLD: 2.5 M/UL — LOW (ref 4.2–5.8)
RBC # FLD: 14.1 % — SIGNIFICANT CHANGE UP (ref 10.3–14.5)
RBC # FLD: 14.3 % — SIGNIFICANT CHANGE UP (ref 10.3–14.5)
RBC # FLD: 14.3 % — SIGNIFICANT CHANGE UP (ref 10.3–14.5)
RBC # FLD: 15.2 % — HIGH (ref 10.3–14.5)
SODIUM SERPL-SCNC: 144 MMOL/L — SIGNIFICANT CHANGE UP (ref 135–145)
WBC # BLD: 17.1 K/UL — HIGH (ref 3.8–10.5)
WBC # BLD: 17.5 K/UL — HIGH (ref 3.8–10.5)
WBC # BLD: 18.1 K/UL — HIGH (ref 3.8–10.5)
WBC # BLD: 24 K/UL — HIGH (ref 3.8–10.5)
WBC # FLD AUTO: 17.1 K/UL — HIGH (ref 3.8–10.5)
WBC # FLD AUTO: 17.5 K/UL — HIGH (ref 3.8–10.5)
WBC # FLD AUTO: 18.1 K/UL — HIGH (ref 3.8–10.5)
WBC # FLD AUTO: 24 K/UL — HIGH (ref 3.8–10.5)

## 2018-02-11 PROCEDURE — 99233 SBSQ HOSP IP/OBS HIGH 50: CPT

## 2018-02-11 PROCEDURE — 99292 CRITICAL CARE ADDL 30 MIN: CPT

## 2018-02-11 PROCEDURE — 99232 SBSQ HOSP IP/OBS MODERATE 35: CPT | Mod: 25,GC

## 2018-02-11 PROCEDURE — 99233 SBSQ HOSP IP/OBS HIGH 50: CPT | Mod: 25

## 2018-02-11 PROCEDURE — 43255 EGD CONTROL BLEEDING ANY: CPT | Mod: 59,GC

## 2018-02-11 PROCEDURE — 78278 ACUTE GI BLOOD LOSS IMAGING: CPT | Mod: 26

## 2018-02-11 PROCEDURE — 99291 CRITICAL CARE FIRST HOUR: CPT

## 2018-02-11 PROCEDURE — 99232 SBSQ HOSP IP/OBS MODERATE 35: CPT | Mod: GC

## 2018-02-11 PROCEDURE — 93750 INTERROGATION VAD IN PERSON: CPT

## 2018-02-11 PROCEDURE — 71045 X-RAY EXAM CHEST 1 VIEW: CPT | Mod: 26

## 2018-02-11 PROCEDURE — 43239 EGD BIOPSY SINGLE/MULTIPLE: CPT | Mod: GC

## 2018-02-11 RX ORDER — VASOPRESSIN 20 [USP'U]/ML
0.03 INJECTION INTRAVENOUS
Qty: 100 | Refills: 0 | Status: DISCONTINUED | OUTPATIENT
Start: 2018-02-11 | End: 2018-02-11

## 2018-02-11 RX ORDER — MIDAZOLAM HYDROCHLORIDE 1 MG/ML
4 INJECTION, SOLUTION INTRAMUSCULAR; INTRAVENOUS ONCE
Qty: 0 | Refills: 0 | Status: DISCONTINUED | OUTPATIENT
Start: 2018-02-11 | End: 2018-02-11

## 2018-02-11 RX ORDER — BENZOCAINE AND MENTHOL 5; 1 G/100ML; G/100ML
1 LIQUID ORAL ONCE
Qty: 0 | Refills: 0 | Status: COMPLETED | OUTPATIENT
Start: 2018-02-11 | End: 2018-02-11

## 2018-02-11 RX ORDER — PROPOFOL 10 MG/ML
10 INJECTION, EMULSION INTRAVENOUS
Qty: 500 | Refills: 0 | Status: DISCONTINUED | OUTPATIENT
Start: 2018-02-11 | End: 2018-02-11

## 2018-02-11 RX ORDER — NOREPINEPHRINE BITARTRATE/D5W 8 MG/250ML
0.01 PLASTIC BAG, INJECTION (ML) INTRAVENOUS
Qty: 8 | Refills: 0 | Status: DISCONTINUED | OUTPATIENT
Start: 2018-02-11 | End: 2018-02-13

## 2018-02-11 RX ORDER — DEXMEDETOMIDINE HYDROCHLORIDE IN 0.9% SODIUM CHLORIDE 4 UG/ML
0.5 INJECTION INTRAVENOUS
Qty: 200 | Refills: 0 | Status: DISCONTINUED | OUTPATIENT
Start: 2018-02-11 | End: 2018-02-11

## 2018-02-11 RX ORDER — SODIUM BICARBONATE 1 MEQ/ML
50 SYRINGE (ML) INTRAVENOUS
Qty: 0 | Refills: 0 | Status: COMPLETED | OUTPATIENT
Start: 2018-02-11 | End: 2018-02-11

## 2018-02-11 RX ORDER — ALBUMIN HUMAN 25 %
250 VIAL (ML) INTRAVENOUS ONCE
Qty: 0 | Refills: 0 | Status: COMPLETED | OUTPATIENT
Start: 2018-02-11 | End: 2018-02-11

## 2018-02-11 RX ADMIN — Medication 1 APPLICATION(S): at 18:43

## 2018-02-11 RX ADMIN — CEFEPIME 100 MILLIGRAM(S): 1 INJECTION, POWDER, FOR SOLUTION INTRAMUSCULAR; INTRAVENOUS at 14:15

## 2018-02-11 RX ADMIN — MIDAZOLAM HYDROCHLORIDE 4 MILLIGRAM(S): 1 INJECTION, SOLUTION INTRAMUSCULAR; INTRAVENOUS at 10:05

## 2018-02-11 RX ADMIN — Medication 500 MILLILITER(S): at 15:09

## 2018-02-11 RX ADMIN — MEXILETINE HYDROCHLORIDE 150 MILLIGRAM(S): 150 CAPSULE ORAL at 06:09

## 2018-02-11 RX ADMIN — QUETIAPINE FUMARATE 50 MILLIGRAM(S): 200 TABLET, FILM COATED ORAL at 21:08

## 2018-02-11 RX ADMIN — BENZOCAINE AND MENTHOL 1 LOZENGE: 5; 1 LIQUID ORAL at 21:24

## 2018-02-11 RX ADMIN — Medication 1 APPLICATION(S): at 06:10

## 2018-02-11 RX ADMIN — CEFEPIME 100 MILLIGRAM(S): 1 INJECTION, POWDER, FOR SOLUTION INTRAMUSCULAR; INTRAVENOUS at 21:08

## 2018-02-11 RX ADMIN — Medication 50 MILLIEQUIVALENT(S): at 16:19

## 2018-02-11 RX ADMIN — Medication 50 MILLIEQUIVALENT(S): at 15:59

## 2018-02-11 RX ADMIN — CEFEPIME 100 MILLIGRAM(S): 1 INJECTION, POWDER, FOR SOLUTION INTRAMUSCULAR; INTRAVENOUS at 06:09

## 2018-02-11 RX ADMIN — MEXILETINE HYDROCHLORIDE 150 MILLIGRAM(S): 150 CAPSULE ORAL at 18:44

## 2018-02-11 NOTE — CHART NOTE - NSCHARTNOTEFT_GEN_A_CORE
Enteroscopy Procedure note:    Indication: Melena and acute blood loss anemia     Impression:   - Normal esophagus and stomach  - Red clotted blood in entire small bowel  - Multiple red spots seen in duodenum and jejunum, four sites with red spots were cauterized using gold probe  - One actively oozing angioectasia seen in distal duodenum, it was cauterized. This is likely the source of bleeding    Plan:  - trend CBC and transfuse as needed  - if patient re-bleeds, consider IR embolisation   - supportive care as per primary team

## 2018-02-11 NOTE — PROGRESS NOTE ADULT - ATTENDING COMMENTS
Leukocytosis is improving on cefepime, no obvious source of infection. Appreciate input from Transplant ID.  He continues to bleed. Would consider bleeding scan and IR intervention if any source seen. GI was planning capsule endoscopy, but unlikely to have data in a timely fashion with a subsequent intervention. Every unit of blood increases risk of sensitization and reduces transplant options.    LDH improving, now at 492, remains off AC. MAP 72-88. INR 2.2. Lactate 0.8.  Renal impairment may be from hypovolemia and uremia possibly due to GIB?  Given episodes of hypotension, would switch the Coreg to Toprol XL 25 mg po BID (prior VT).  Tele with NSR, frequent PVCs.    UNOS Status 7 until we sort out the leukocytosis hopefully by Monday. GI to do upper endoscopy this AM to hopefully stop bleeding. Had melena this AM.  Leukocytosis continues to improve on cefepime. No source of infection.  Renal function also improving with NORMAN likely due to volume loss with bleeding. U/O 3190 without diuretics.    LDH improving, now at 397, remains off AC. MAP 76-89. INR 1.98.  Currently UNOS Status 7, but hope to reactivate as 1A tomorrow morning.

## 2018-02-11 NOTE — PROGRESS NOTE ADULT - SUBJECTIVE AND OBJECTIVE BOX
BILLY RUSSELL   MRN#: [id number]     The patient is a 67y Male who was seen, evaluated, & examined with the CTICU staff on rounds and later in the day with a multidisciplinary care plan formulated & implemented.  All available clinical, laboratory, radiographic, pharmacologic, and electrocardiographic data were reviewed & analyzed.      The patient was in the CTICU in critical condition secondary to acute upper GI bleed-hemorrhagic shock, persistent cardiopulmonary dysfunction, and cardiogenic shock-cardiovascular dysfunction-S/P LAVD.      Respiratory status required re-intubation, full ventilatory support, the following of ABG’s with A-line monitoring, continuous pulse oximetry monitoring, and an IV Propofol infusion for support & to evaluate for & prevent further decompensation secondary to acute upper GI bleed-hemorrhagic shock-need for upper endoscopy, persistent cardiopulmonary dysfunction and cardiogenic shock-cardiovascular dysfunction-S/P LVAD.     Invasive hemodynamic monitoring with an A-line was required for the following of continuous MAP/BP monitoring to ensure adequate cardiovascular support and to evaluate for & help prevent decompensation while receiving intermittent volume expansion, blood transfusions, an IV Protonix drip, an IV Vasopressin drip and emergent upper endoscopy secondary to acute upper GI bleed-hemorrhagic shock, cardiogenic shock-cardiovascular dysfunction-S/P LVAD, and acute blood loss anemia.       Patient required critical care management and I provided 80 minutes of non-continuous care to the patient.  Discussed at length with the CTICU staff and helped coordinate care. BILLY RUSSELL   MRN#: [id number]     The patient is a 67y Male who was seen, evaluated, & examined with the CTICU staff on rounds and later in the day with a multidisciplinary care plan formulated & implemented.  All available clinical, laboratory, radiographic, pharmacologic, and electrocardiographic data were reviewed & analyzed.      The patient was in the CTICU in critical condition secondary to acute upper GI bleed-hemorrhagic shock, persistent cardiopulmonary dysfunction, and cardiogenic shock-cardiovascular dysfunction-S/P LAVD.      Respiratory status required re-intubation, full ventilatory support, the following of ABG’s with A-line monitoring, continuous pulse oximetry monitoring, and an IV Propofol infusion for support & to evaluate for & prevent further decompensation secondary to acute upper GI bleed-hemorrhagic shock-need for upper endoscopy, persistent cardiopulmonary dysfunction and cardiogenic shock-cardiovascular dysfunction-S/P LVAD.     Invasive hemodynamic monitoring with an A-line was required for the following of continuous MAP/BP monitoring to ensure adequate cardiovascular support and to evaluate for & help prevent decompensation while receiving intermittent volume expansion, blood transfusions, an IV Protonix drip, an IV Vasopressin drip and emergent upper endoscopy secondary to acute upper GI bleed-hemorrhagic shock, cardiogenic shock-cardiovascular dysfunction-S/P LVAD, and acute blood loss anemia.       Patient required critical care management and I provided 110 minutes of non-continuous care to the patient.  Discussed at length with the CTICU staff and helped coordinate care.

## 2018-02-11 NOTE — PROGRESS NOTE ADULT - PROBLEM SELECTOR PLAN 1
In setting of obstructive uropathy: Scr. was elevated at 1.43 on 2/1/18 which then worsened to 2.11 on 2/9/18. Rosas was placed and drained about 695 cc. Patient with NORMAN secondary to obstructive uropathy. Serum Cr. has improved to 1.71 today. Continue to monitor Scr., electrolytes, and urine output. Avoid nephrotoxins.

## 2018-02-11 NOTE — PROGRESS NOTE ADULT - ASSESSMENT
Imp:  While it is a diagnosis of exclusion, I do think that the WBC elevation is a reactive leukocytosis from the GI bleed.  Does not appear infected--If he were I'd expect him to be far sicker and he is not acutely ill.  He has been afebrile and the BC are negative.  Vanco was held yesterday.    Rx:  suggest stopping all abx today (cefepime) and observing closely.  d/w team yesterday.  thanks

## 2018-02-11 NOTE — PROGRESS NOTE ADULT - ASSESSMENT
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 He was resumed on coumadin but remained subtherapeutic and due to inability to use Lovenox given his history of GIBs he was admitted and started on a heparin gtt for likely pump thrombosis. LDH continued to rise prompting addition of integrilin, but he had a subsequent drop in Hgb with confirmed blood in GI seen on push enteroscopy without active bleeding evident. Now s/p 4u pRBCs. 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 He was resumed on coumadin but remained subtherapeutic and due to inability to use Lovenox given his history of GIBs he was admitted and started on a heparin gtt for likely pump thrombosis. LDH continued to rise prompting addition of integrilin, but he had a subsequent drop in Hgb with confirmed blood seen on push enteroscopy without active bleeding evident. Hgb dropped again prompting a bleeding scan showing active duodenal bleeding. Now s/p 6u pRBCs.

## 2018-02-11 NOTE — PROGRESS NOTE ADULT - ASSESSMENT
67 male with PMH Chronic Systolic Heart Failure (ACC/AHA Stage D) due to NICMP s/p LVAD 6/12/17, GIB s/p Cautery (7/25/2017), known AV Malformations with multiple enteroscopies last year, A-Fib, VT s/p AICD. Pt admitted due to elevated LDH level of 646 for further evaluation and observation. Since admission, Hgb slowly downtrending.     Impression:  1. Anemia without over bleeding - s/p enteroscopy on 2/9 with distal duodenal/Jejunal blood clot, NM GI bleed scan on 2/10 with bleeding in proximal duodenum   2. NICM s/p LVAD  3. Multiple episodes of GI bleeding in past, due to angioectasias that were clipped    Recommend:  - please trend CBC, transfuse as needed to goal Hb as per cardiology  - please keep patient NPO for possible endoscopy +/- enteroscopy   - supportive care as per primary team     GI team will continue to follow.  Thank you for the consult.

## 2018-02-11 NOTE — PROGRESS NOTE ADULT - SUBJECTIVE AND OBJECTIVE BOX
INFECTIOUS DISEASES FOLLOW UP--Edil gN MD  Pager [phone]    This is a follow up note for this  67y Male with  LVAD  GIB and leukocytosis.  No complaints and the WBC down a bit.  BC neg  afebrile    Further ROS:  CONSTITUTIONAL:  No fever, would like to eat  CARDIOVASCULAR:  No chest pain or palpitations  RESPIRATORY:  No dyspnea at rest  GASTROINTESTINAL:  No nausea, vomiting, diarrhea, or abdominal pain    Allergies  No Known Allergies    ANTIBIOTICS/RELEVANT:  antimicrobials  cefepime  IVPB 1000 milliGRAM(s) IV Intermittent every 8 hours    immunologic:    OTHER:  acetaminophen   Tablet 650 milliGRAM(s) Oral every 6 hours PRN  ascorbic acid 500 milliGRAM(s) Oral two times a day  docusate sodium 100 milliGRAM(s) Oral three times a day  ferrous    sulfate 325 milliGRAM(s) Oral daily  hydrocortisone 2.5% Cream 1 Application(s) Topical two times a day  metoprolol succinate ER 25 milliGRAM(s) Oral two times a day  mexiletine 150 milliGRAM(s) Oral two times a day  pantoprazole Infusion 8 mG/Hr IV Continuous <Continuous>  polyethylene glycol 3350 17 Gram(s) Oral daily  QUEtiapine 50 milliGRAM(s) Oral at bedtime  senna 2 Tablet(s) Oral at bedtime  vasopressin Infusion 0.033 Unit(s)/Min IV Continuous <Continuous>      Objective:  Vital Signs Last 24 Hrs  T(C): 36.9 (2018 04:00), Max: 36.9 (2018 04:00)  T(F): 98.4 (2018 04:00), Max: 98.4 (2018 04:00)  HR: 89 (2018 08:00) (51 - 99)  BP: --  BP(mean): --  RR: 11 (2018 08:00) (11 - 77)  SpO2: 100% (2018 08:00) (80% - 100%)    PHYSICAL EXAM:  Constitutional:no acute distress  Eyes:WALT, EOMI  Ear/Nose/Throat: no oral lesions, 	  Respiratory: clear BL  Cardiovascular: S1S2  Gastrointestinal:soft, (+) BS, no tenderness  Extremities:no e/e/c  No Lymphadenopathy  IV sites not inflammed.    LABS:                        6.5    17.5  )-----------( 133      ( 2018 01:56 )             18.3         144  |  114<H>  |  80<H>  ----------------------------<  135<H>  4.9   |  19<L>  |  1.71<H>    Ca    7.9<L>      2018 01:56  Phos  2.9       Mg     2.6         TPro  5.0<L>  /  Alb  2.6<L>  /  TBili  0.3  /  DBili  x   /  AST  23  /  ALT  12  /  AlkPhos  37<L>      PT/INR - ( 2018 01:56 )   PT: 21.9 sec;   INR: 1.98 ratio         PTT - ( 2018 01:56 )  PTT:30.2 sec  Urinalysis Basic - ( 2018 12:27 )    Color: Yellow / Appearance: Clear / S.023 / pH: x  Gluc: x / Ketone: Negative  / Bili: Negative / Urobili: Negative   Blood: x / Protein: Negative / Nitrite: Negative   Leuk Esterase: Negative / RBC: 0-2 /HPF / WBC 0-2 /HPF   Sq Epi: x / Non Sq Epi: OCC /HPF / Bacteria: x    MICROBIOLOGY:  BC negative

## 2018-02-11 NOTE — PROGRESS NOTE ADULT - SUBJECTIVE AND OBJECTIVE BOX
Chief Complaint:  Patient is a 67y old  Male who presents with a chief complaint of Elevated LDH levels (2018 19:36)      Interval Events:     Allergies:  No Known Allergies      Hospital Medications:  acetaminophen   Tablet 650 milliGRAM(s) Oral every 6 hours PRN  ascorbic acid 500 milliGRAM(s) Oral two times a day  cefepime  IVPB 1000 milliGRAM(s) IV Intermittent every 8 hours  docusate sodium 100 milliGRAM(s) Oral three times a day  ferrous    sulfate 325 milliGRAM(s) Oral daily  hydrocortisone 2.5% Cream 1 Application(s) Topical two times a day  metoprolol succinate ER 25 milliGRAM(s) Oral two times a day  mexiletine 150 milliGRAM(s) Oral two times a day  pantoprazole Infusion 8 mG/Hr IV Continuous <Continuous>  polyethylene glycol 3350 17 Gram(s) Oral daily  QUEtiapine 50 milliGRAM(s) Oral at bedtime  senna 2 Tablet(s) Oral at bedtime  vasopressin Infusion 0.033 Unit(s)/Min IV Continuous <Continuous>      PMHX/PSHX:  GIB (gastrointestinal bleeding)  H/O prior ablation treatment  Ventricular fibrillation  PAF (paroxysmal atrial fibrillation)  Non-Ischemic Cardiomyopathy  SVT (Supraventricular Tachycardia)  HTN  CHF (Congestive Heart Failure)  LVAD (left ventricular assist device) present  Status post left hip replacement  Hypertension  Hypertension  History of Prior Ablation Treatment  AICD (Automatic Cardioverter/Defibrillator) Present      Family history:  No pertinent family history in first degree relatives      ROS:     General:  No wt loss, fevers, chills, night sweats, fatigue,   Eyes:  Good vision, no reported pain  ENT:  No sore throat, pain, runny nose, dysphagia  CV:  No pain, palpitations, hypo/hypertension  Resp:  No dyspnea, cough, tachypnea, wheezing  GI:  See HPI  :  No pain, bleeding, incontinence, nocturia  Muscle:  No pain, weakness  Neuro:  No weakness, tingling, memory problems  Psych:  No fatigue, insomnia, mood problems, depression  Endocrine:  No polyuria, polydipsia, cold/heat intolerance  Heme:  No petechiae, ecchymosis, easy bruisability  Skin:  No rash, edema      PHYSICAL EXAM:     GENERAL:  Appears stated age, well-groomed, well-nourished, no distress  HEENT:  NC/AT,  conjunctivae clear, sclera -anicteric  CHEST:  Full & symmetric excursion, no increased effort, breath sounds clear  HEART:  Regular rhythm, S1, S2, no murmur/rub/S3/S4,  no edema  ABDOMEN:  Soft, non-tender, non-distended, normoactive bowel sounds,  no masses ,no hepato-splenomegaly,   EXTREMITIES:  no cyanosis,clubbing or edema  SKIN:  No rash/erythema/ecchymoses/petechiae/wounds/abscess/warm/dry  NEURO:  Alert, oriented    Vital Signs:  Vital Signs Last 24 Hrs  T(C): 36.9 (2018 04:00), Max: 36.9 (2018 04:00)  T(F): 98.4 (2018 04:00), Max: 98.4 (2018 04:00)  HR: 89 (2018 07:00) (51 - 99)  BP: --  BP(mean): --  RR: 16 (:00) (11 - 77)  SpO2: 100% (:00) (80% - 100%)  Daily     Daily Weight in k.4 (2018 01:30)    LABS:                        6.5    17.5  )-----------( 133      ( 2018 01:56 )             18.3         144  |  114<H>  |  80<H>  ----------------------------<  135<H>  4.9   |  19<L>  |  1.71<H>    Ca    7.9<L>      2018 01:56  Phos  2.9       Mg     2.6         TPro  5.0<L>  /  Alb  2.6<L>  /  TBili  0.3  /  DBili  x   /  AST  23  /  ALT  12  /  AlkPhos  37<L>      LIVER FUNCTIONS - ( 2018 01:56 )  Alb: 2.6 g/dL / Pro: 5.0 g/dL / ALK PHOS: 37 U/L / ALT: 12 U/L RC / AST: 23 U/L / GGT: x           PT/INR - ( 2018 01:56 )   PT: 21.9 sec;   INR: 1.98 ratio         PTT - ( 2018 01:56 )  PTT:30.2 sec  Urinalysis Basic - ( 2018 12:27 )    Color: Yellow / Appearance: Clear / S.023 / pH: x  Gluc: x / Ketone: Negative  / Bili: Negative / Urobili: Negative   Blood: x / Protein: Negative / Nitrite: Negative   Leuk Esterase: Negative / RBC: 0-2 /HPF / WBC 0-2 /HPF   Sq Epi: x / Non Sq Epi: OCC /HPF / Bacteria: x          Imaging:    < from: NM GI Bleed Localization (18 @ 00:19) >    IMPRESSION: Abnormal GI bleeding scan.    Active bleeding site in the proximal duodenum.    Feliciano Taylor was notified of these results by Dr. Menchaca with read back at   about 12:35 AM on 2018.    < end of copied text >    < from: Enteroscopy (18 @ 13:34) >  Impression:          - Small amount of food in esophagus, otherwise normal esophagus.                       - Small amount of food in stomach, otherwise unremarkable stomach.                       - Normal duodenal bulb, first part of the duodenum, 2nd part of the duodenum                        and 3rd part of the duodenum.                       - Distal duodenal/Jejunal blood clot. No active source of bleedingfound. No                        active bleeding seen.                       - No specimens collected.  Recommendation:      - Return patient to ICU for ongoing care.                       - Anticoagulation/antiplatelet therapy as per primary team.                      - PPI daily.                       - Monitor blood counts.                       -If rebleed occurs, consider repeat enteroscopy    < end of copied text >      < from: Enteroscopy (17 @ 13:38) >  Findings:       The examined esophagus was normal.       The Z-line was regular and was found 37 cm from the incisors.       The entire examined stomach was normal.       A 1 cm subepithelail nodule was found at 4th part of the duodenum.       The exam of the duodenum was otherwise normal.       The examined jejunum was normal to the proximal-mid jejunum..                                                                                                        Impression:          - Normal esophagus.                       - Z-line regular, 37 cm from the incisors.                       - Normal stomach.                       - Small 1 cm subepithelial nodule found in the duodenum.                       - Normal examined jejunum to the proximal-mid jejunum.                       - No specimens collected.  Recommendation:      - Return patient to ICU for ongoing care.                       - Advance diet as tolerated today. NPO past MN tonight. .                       - To visualize the small bowel, perform video capsule endoscopy tomorrow. NPO                        past MN tonight.            - Monitor Hgb and bowel movements.    < end of copied text > Chief Complaint:  Patient is a 67y old  Male who presents with a chief complaint of Elevated LDH levels (2018 19:36)      Interval Events:   patient seen and examined.   He had episodes of melena overnight.  His Hb decreased and he received 2u pRBC.  He denies nausea, vomiting, abdominal pain.     Allergies:  No Known Allergies      Hospital Medications:  acetaminophen   Tablet 650 milliGRAM(s) Oral every 6 hours PRN  ascorbic acid 500 milliGRAM(s) Oral two times a day  cefepime  IVPB 1000 milliGRAM(s) IV Intermittent every 8 hours  docusate sodium 100 milliGRAM(s) Oral three times a day  ferrous    sulfate 325 milliGRAM(s) Oral daily  hydrocortisone 2.5% Cream 1 Application(s) Topical two times a day  metoprolol succinate ER 25 milliGRAM(s) Oral two times a day  mexiletine 150 milliGRAM(s) Oral two times a day  pantoprazole Infusion 8 mG/Hr IV Continuous <Continuous>  polyethylene glycol 3350 17 Gram(s) Oral daily  QUEtiapine 50 milliGRAM(s) Oral at bedtime  senna 2 Tablet(s) Oral at bedtime  vasopressin Infusion 0.033 Unit(s)/Min IV Continuous <Continuous>      PMHX/PSHX:  GIB (gastrointestinal bleeding)  H/O prior ablation treatment  Ventricular fibrillation  PAF (paroxysmal atrial fibrillation)  Non-Ischemic Cardiomyopathy  SVT (Supraventricular Tachycardia)  HTN  CHF (Congestive Heart Failure)  LVAD (left ventricular assist device) present  Status post left hip replacement  Hypertension  Hypertension  History of Prior Ablation Treatment  AICD (Automatic Cardioverter/Defibrillator) Present      Family history:  No pertinent family history in first degree relatives      ROS:     General:  No wt loss, fevers, chills, night sweats, fatigue,   Eyes:  Good vision, no reported pain  ENT:  No sore throat, pain, runny nose, dysphagia  CV:  No pain, palpitations, hypo/hypertension  Resp:  No dyspnea, cough, tachypnea, wheezing  GI:  See HPI  :  No pain, bleeding, incontinence, nocturia  Muscle:  No pain, weakness  Neuro:  No weakness, tingling, memory problems  Psych:  No fatigue, insomnia, mood problems, depression  Endocrine:  No polyuria, polydipsia, cold/heat intolerance  Heme:  No petechiae, ecchymosis, easy bruisability  Skin:  No rash, edema      PHYSICAL EXAM:     GENERAL:  Appears stated age, well-groomed, well-nourished, no distress  HEENT:  NC/AT,  conjunctivae clear, sclera -anicteric  CHEST:  Full & symmetric excursion, no increased effort, breath sounds clear  HEART:  Regular rhythm, S1, S2, no murmur/rub/S3/S4,  no edema  ABDOMEN:  Soft, non-tender, non-distended, normoactive bowel sounds,  no masses ,no hepato-splenomegaly,   EXTREMITIES:  no cyanosis,clubbing or edema  SKIN:  No rash/erythema/ecchymoses/petechiae/wounds/abscess/warm/dry  NEURO:  Alert, oriented    Vital Signs:  Vital Signs Last 24 Hrs  T(C): 36.9 (2018 04:00), Max: 36.9 (2018 04:00)  T(F): 98.4 (:00), Max: 98.4 (2018 04:00)  HR: 89 (:00) (51 - 99)  BP: --  BP(mean): --  RR: 16 (2018 07:00) (11 - 77)  SpO2: 100% (2018 07:00) (80% - 100%)  Daily     Daily Weight in k.4 (2018 01:30)    LABS:                        6.5    17.5  )-----------( 133      ( 2018 01:56 )             18.3     02-11    144  |  114<H>  |  80<H>  ----------------------------<  135<H>  4.9   |  19<L>  |  1.71<H>    Ca    7.9<L>      2018 01:56  Phos  2.9     -  Mg     2.6     -    TPro  5.0<L>  /  Alb  2.6<L>  /  TBili  0.3  /  DBili  x   /  AST  23  /  ALT  12  /  AlkPhos  37<L>  02-11    LIVER FUNCTIONS - ( 2018 01:56 )  Alb: 2.6 g/dL / Pro: 5.0 g/dL / ALK PHOS: 37 U/L / ALT: 12 U/L RC / AST: 23 U/L / GGT: x           PT/INR - ( 2018 01:56 )   PT: 21.9 sec;   INR: 1.98 ratio         PTT - ( 2018 01:56 )  PTT:30.2 sec  Urinalysis Basic - ( 2018 12:27 )    Color: Yellow / Appearance: Clear / S.023 / pH: x  Gluc: x / Ketone: Negative  / Bili: Negative / Urobili: Negative   Blood: x / Protein: Negative / Nitrite: Negative   Leuk Esterase: Negative / RBC: 0-2 /HPF / WBC 0-2 /HPF   Sq Epi: x / Non Sq Epi: OCC /HPF / Bacteria: x          Imaging:    < from: NM GI Bleed Localization (18 @ 00:19) >    IMPRESSION: Abnormal GI bleeding scan.    Active bleeding site in the proximal duodenum.    Feliciano Taylor was notified of these results by Dr. Menchaca with read back at   about 12:35 AM on 2018.    < end of copied text >    < from: Enteroscopy (18 @ 13:34) >  Impression:          - Small amount of food in esophagus, otherwise normal esophagus.                       - Small amount of food in stomach, otherwise unremarkable stomach.                       - Normal duodenal bulb, first part of the duodenum, 2nd part of the duodenum                        and 3rd part of the duodenum.                       - Distal duodenal/Jejunal blood clot. No active source of bleedingfound. No                        active bleeding seen.                       - No specimens collected.  Recommendation:      - Return patient to ICU for ongoing care.                       - Anticoagulation/antiplatelet therapy as per primary team.                      - PPI daily.                       - Monitor blood counts.                       -If rebleed occurs, consider repeat enteroscopy    < end of copied text >      < from: Enteroscopy (17 @ 13:38) >  Findings:       The examined esophagus was normal.       The Z-line was regular and was found 37 cm from the incisors.       The entire examined stomach was normal.       A 1 cm subepithelail nodule was found at 4th part of the duodenum.       The exam of the duodenum was otherwise normal.       The examined jejunum was normal to the proximal-mid jejunum..                                                                                                        Impression:          - Normal esophagus.                       - Z-line regular, 37 cm from the incisors.                       - Normal stomach.                       - Small 1 cm subepithelial nodule found in the duodenum.                       - Normal examined jejunum to the proximal-mid jejunum.                       - No specimens collected.  Recommendation:      - Return patient to ICU for ongoing care.                       - Advance diet as tolerated today. NPO past MN tonight. .                       - To visualize the small bowel, perform video capsule endoscopy tomorrow. NPO                        past MN tonight.            - Monitor Hgb and bowel movements.    < end of copied text >

## 2018-02-11 NOTE — PROGRESS NOTE ADULT - ATTENDING COMMENTS
NORMAN and hyperkalemia from urinary retention.    Good urine output and renal function improving  K improved with diaz and medical management

## 2018-02-11 NOTE — PROGRESS NOTE ADULT - SUBJECTIVE AND OBJECTIVE BOX
[facility] Division of Kidney Diseases & Hypertension  FOLLOW UP NOTE  [phone]--------------------------------------------------------------------------------    24 hour subjective:    Had bleeding scan yesterday; shows bleeding in proximal duodenem  Patient denies complains of SOB, CP, abdominal pain  Making good UOP.     PAST HISTORY  --------------------------------------------------------------------------------  No significant changes to PMH, PSH, FHx, SHx, unless otherwise noted    ALLERGIES & MEDICATIONS  --------------------------------------------------------------------------------  Allergies    No Known Allergies    Intolerances      Standing Inpatient Medications  ascorbic acid 500 milliGRAM(s) Oral two times a day  cefepime  IVPB 1000 milliGRAM(s) IV Intermittent every 8 hours  docusate sodium 100 milliGRAM(s) Oral three times a day  ferrous    sulfate 325 milliGRAM(s) Oral daily  hydrocortisone 2.5% Cream 1 Application(s) Topical two times a day  metoprolol succinate ER 25 milliGRAM(s) Oral two times a day  mexiletine 150 milliGRAM(s) Oral two times a day  pantoprazole Infusion 8 mG/Hr IV Continuous <Continuous>  polyethylene glycol 3350 17 Gram(s) Oral daily  QUEtiapine 50 milliGRAM(s) Oral at bedtime  senna 2 Tablet(s) Oral at bedtime  vasopressin Infusion 0.033 Unit(s)/Min IV Continuous <Continuous>    PRN Inpatient Medications  acetaminophen   Tablet 650 milliGRAM(s) Oral every 6 hours PRN      REVIEW OF SYSTEMS  --------------------------------------------------------------------------------  Gen: No  fevers/chills  Respiratory: No dyspnea  CV: No chest pain  GI: No abdominal pain,nausea, vomiting  MK: no edema  Neuro: No dizziness/lightheadedness      VITALS/PHYSICAL EXAM  --------------------------------------------------------------------------------  T(C): 36.9 (02-11-18 @ 04:00), Max: 36.9 (02-11-18 @ 04:00)  HR: 89 (02-11-18 @ 08:00) (51 - 99)  BP: --  RR: 11 (02-11-18 @ 08:00) (11 - 77)  SpO2: 100% (02-11-18 @ 08:00) (80% - 100%)  Wt(kg): --        02-10-18 @ 07:01  -  02-11-18 @ 07:00  --------------------------------------------------------  IN: 954 mL / OUT: 3190 mL / NET: -2236 mL      Physical Exam:    	Gen: Patient intubated, on mechanical ventilator   	HEENT: no JVD  	Pulm: CTA B/L  	CV:  S1S2  	Abd: +BS, soft              : emily present.   	Ext: No B/L Lower ext edema  	Neuro: No focal deficits  	Skin: Warm and dry     LABS/STUDIES  --------------------------------------------------------------------------------              8.4    18.1  >-----------<  111      [02-11-18 @ 08:40]              23.3     144  |  114  |  80  ----------------------------<  135      [02-11-18 @ 01:56]  4.9   |  19  |  1.71        Ca     7.9     [02-11-18 @ 01:56]      Mg     2.6     [02-11-18 @ 01:56]      Phos  2.9     [02-11-18 @ 01:56]    TPro  5.0  /  Alb  2.6  /  TBili  0.3  /  DBili  x   /  AST  23  /  ALT  12  /  AlkPhos  37  [02-11-18 @ 01:56]    PT/INR: PT 21.9 , INR 1.98       [02-11-18 @ 01:56]  PTT: 30.2       [02-11-18 @ 01:56]          [02-11-18 @ 01:56]    Creatinine Trend:  SCr 1.71 [02-11 @ 01:56]  SCr 2.00 [02-10 @ 01:44]  SCr 2.10 [02-09 @ 12:22]  SCr 2.12 [02-09 @ 04:09]  SCr 2.11 [02-09 @ 02:38]    Urinalysis - [02-09-18 @ 12:27]      Color Yellow / Appearance Clear / SG 1.023 / pH 5.5      Gluc Negative / Ketone Negative  / Bili Negative / Urobili Negative       Blood Negative / Protein Negative / Leuk Est Negative / Nitrite Negative      RBC 0-2 / WBC 0-2 / Hyaline 5-10 / Gran  / Sq Epi  / Non Sq Epi OCC / Bacteria

## 2018-02-11 NOTE — PROGRESS NOTE ADULT - ATTENDING COMMENTS
Push enteroscopy performed. 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 Bicap performed with good hemostasis.

## 2018-02-11 NOTE — PROGRESS NOTE ADULT - SUBJECTIVE AND OBJECTIVE BOX
Medications:  acetaminophen   Tablet 650 milliGRAM(s) Oral every 6 hours PRN  ascorbic acid 500 milliGRAM(s) Oral two times a day  cefepime  IVPB 1000 milliGRAM(s) IV Intermittent every 8 hours  docusate sodium 100 milliGRAM(s) Oral three times a day  ferrous    sulfate 325 milliGRAM(s) Oral daily  hydrocortisone 2.5% Cream 1 Application(s) Topical two times a day  metoprolol succinate ER 25 milliGRAM(s) Oral two times a day  mexiletine 150 milliGRAM(s) Oral two times a day  pantoprazole Infusion 8 mG/Hr IV Continuous <Continuous>  polyethylene glycol 3350 17 Gram(s) Oral daily  QUEtiapine 50 milliGRAM(s) Oral at bedtime  senna 2 Tablet(s) Oral at bedtime  vasopressin Infusion 0.033 Unit(s)/Min IV Continuous <Continuous>      Vitals:  T(C): 36.9 (18 @ 04:00), Max: 36.9 (18 @ 04:00)  HR: 89 (18 @ 08:00) (51 - 99)  BP: --  BP(mean): --  ABP: 88/68 (18 @ 08:00) (71/55 - 104/95)  ABP(mean): 76 (18 @ 08:00) (58 - 99)  RR: 11 (18 @ 08:00) (11 - 77)  SpO2: 100% (18 @ 08:00) (80% - 100%)    Vital Signs Last 24 Hrs  T(C): 36.9 (2018 04:00), Max: 36.9 (2018 04:00)  T(F): 98.4 (2018 04:00), Max: 98.4 (2018 04:00)  HR: 89 (2018 08:00) (51 - 99)  RR: 11 (2018 08:00) (11 - 77)  SpO2: 100% (2018 08:00) (80% - 100%)    Daily     Daily Weight in k.4 (2018 01:30)    I&O's Summary    10 Feb 2018 07:01  -  2018 07:00  --------------------------------------------------------  IN: 954 mL / OUT: 3190 mL / NET: -2236 mL      Physical Exam:     General: No distress. Comfortable.  Neck: Neck supple. JVP not elevated. No masses  Chest: Clear to auscultation bilaterally  CV: Typical LVAD sounds. No distal pulses  Abdomen: Soft, non-distended, non-tender  Skin: No rashes or skin breakdown, warm and well perfused  Neurology: Alert and oriented times three. Sensation intact  Psych: Affect normal    LVAD Interrogation: Heart Mate II  Speed: 9200 rpms  Power: 5.5-5.7 raymond  Flow: 5.0-5.4 lpm  PI: 6.1-7.0  Event:  No programming changes were made      Labs:                        8.4    18.1  )-----------( 111      ( 2018 08:40 )             23.3         144  |  114<H>  |  80<H>  ----------------------------<  135<H>  4.9   |  19<L>  |  1.71<H>    Ca    7.9<L>      2018 01:56  Phos  2.9       Mg     2.6         TPro  5.0<L>  /  Alb  2.6<L>  /  TBili  0.3  /  DBili  x   /  AST  23  /  ALT  12  /  AlkPhos  37<L>      PT/INR - ( 2018 01:56 )   PT: 21.9 sec;   INR: 1.98 ratio    PTT - ( 2018 01:56 )  PTT:30.2 sec      Serum Pro-Brain Natriuretic Peptide: 163 pg/mL ( @ 12:22)      Lactate Dehydrogenase, Serum: 397 U/L ( @ 01:56)  Lactate Dehydrogenase, Serum: 492 U/L (02-10 @ 01:44)  Lactate Dehydrogenase, Serum: 612 U/L ( @ 12:22)  Lactate Dehydrogenase, Serum: 701 U/L ( @ 02:38) Bleeding scan was positive and he required 2 additional units of blood overnight. This morning, GI had not yet scoped him for what seems to be a duodenal bleed. He was just intubated for the procedure and awaiting GI to return to bedside. Hemodynamically stable.    Medications:  acetaminophen   Tablet 650 milliGRAM(s) Oral every 6 hours PRN  ascorbic acid 500 milliGRAM(s) Oral two times a day  cefepime  IVPB 1000 milliGRAM(s) IV Intermittent every 8 hours  docusate sodium 100 milliGRAM(s) Oral three times a day  ferrous    sulfate 325 milliGRAM(s) Oral daily  hydrocortisone 2.5% Cream 1 Application(s) Topical two times a day  metoprolol succinate ER 25 milliGRAM(s) Oral two times a day  mexiletine 150 milliGRAM(s) Oral two times a day  pantoprazole Infusion 8 mG/Hr IV Continuous <Continuous>  polyethylene glycol 3350 17 Gram(s) Oral daily  QUEtiapine 50 milliGRAM(s) Oral at bedtime  senna 2 Tablet(s) Oral at bedtime  vasopressin Infusion 0.033 Unit(s)/Min IV Continuous <Continuous>      Vitals:  T(C): 36.9 (18 @ 04:00), Max: 36.9 (18 @ 04:00)  HR: 89 (18 @ 08:00) (51 - 99)  ABP: 88/68 (18 @ 08:00) (71/55 - 104/95)  ABP(mean): 76 (18 @ 08:00) (58 - 99)  RR: 11 (18 @ 08:00) (11 - 77)  SpO2: 100% (18 @ 08:00) (80% - 100%)    Vital Signs Last 24 Hrs  T(C): 36.9 (2018 04:00), Max: 36.9 (2018 04:00)  T(F): 98.4 (2018 04:00), Max: 98.4 (2018 04:00)  HR: 89 (2018 08:00) (51 - 99)  RR: 11 (2018 08:00) (11 - 77)  SpO2: 100% (2018 08:00) (80% - 100%)    Daily     Daily Weight in k.4 (2018 01:30)    I&O's Summary    10 Feb 2018 07:01  -  2018 07:00  --------------------------------------------------------  IN: 954 mL / OUT: 3190 mL / NET: -2236 mL      Physical Exam:     General: No distress. Comfortable. Had melena this am.  Neck: Neck supple.   Chest: Clear to auscultation anterolaterally  CV: Typical LVAD sounds. No distal pulses  Abdomen: Soft, non-distended, non-tender  Skin: No rashes or skin breakdown, warm and well perfused  Neurology: Alert and oriented times three. Sensation intact  Psych: Affect normal    LVAD Interrogation: Heart Mate II  Speed: 9200 rpms  Power: 5.5-5.7 raymond  Flow: 5.0-5.4 lpm  PI: 6.1-7.0  Event: several PI events in setting of volume depletion  No programming changes were made      Labs:                        8.4    18.1  )-----------( 111      ( 2018 08:40 )             23.3     11    144  |  114<H>  |  80<H>  ----------------------------<  135<H>  4.9   |  19<L>  |  1.71<H>    Ca    7.9<L>      2018 01:56  Phos  2.9       Mg     2.6         TPro  5.0<L>  /  Alb  2.6<L>  /  TBili  0.3  /  DBili  x   /  AST  23  /  ALT  12  /  AlkPhos  37<L>      PT/INR - ( 2018 01:56 )   PT: 21.9 sec;   INR: 1.98 ratio    PTT - ( 2018 01:56 )  PTT:30.2 sec      Serum Pro-Brain Natriuretic Peptide: 163 pg/mL ( @ 12:22)      Lactate Dehydrogenase, Serum: 397 U/L ( @ 01:56)  Lactate Dehydrogenase, Serum: 492 U/L (02-10 @ 01:44)  Lactate Dehydrogenase, Serum: 612 U/L ( @ 12:22)  Lactate Dehydrogenase, Serum: 701 U/L ( @ 02:38)

## 2018-02-12 DIAGNOSIS — I47.2 VENTRICULAR TACHYCARDIA: ICD-10-CM

## 2018-02-12 DIAGNOSIS — D72.829 ELEVATED WHITE BLOOD CELL COUNT, UNSPECIFIED: ICD-10-CM

## 2018-02-12 DIAGNOSIS — N17.9 ACUTE KIDNEY FAILURE, UNSPECIFIED: ICD-10-CM

## 2018-02-12 DIAGNOSIS — K92.2 GASTROINTESTINAL HEMORRHAGE, UNSPECIFIED: ICD-10-CM

## 2018-02-12 DIAGNOSIS — T82.9XXA UNSPECIFIED COMPLICATION OF CARDIAC AND VASCULAR PROSTHETIC DEVICE, IMPLANT AND GRAFT, INITIAL ENCOUNTER: ICD-10-CM

## 2018-02-12 DIAGNOSIS — Z76.82 AWAITING ORGAN TRANSPLANT STATUS: ICD-10-CM

## 2018-02-12 DIAGNOSIS — I50.20 UNSPECIFIED SYSTOLIC (CONGESTIVE) HEART FAILURE: ICD-10-CM

## 2018-02-12 LAB
ANION GAP SERPL CALC-SCNC: 9 MMOL/L — SIGNIFICANT CHANGE UP (ref 5–17)
BUN SERPL-MCNC: 51 MG/DL — HIGH (ref 7–23)
CALCIUM SERPL-MCNC: 7.8 MG/DL — LOW (ref 8.4–10.5)
CHLORIDE SERPL-SCNC: 113 MMOL/L — HIGH (ref 96–108)
CO2 SERPL-SCNC: 22 MMOL/L — SIGNIFICANT CHANGE UP (ref 22–31)
CREAT SERPL-MCNC: 1.32 MG/DL — HIGH (ref 0.5–1.3)
GAS PNL BLDA: SIGNIFICANT CHANGE UP
GLUCOSE SERPL-MCNC: 115 MG/DL — HIGH (ref 70–99)
HCT VFR BLD CALC: 18.5 % — CRITICAL LOW (ref 39–50)
HCT VFR BLD CALC: 21.6 % — LOW (ref 39–50)
HCT VFR BLD CALC: 21.7 % — LOW (ref 39–50)
HGB BLD-MCNC: 6.4 G/DL — CRITICAL LOW (ref 13–17)
HGB BLD-MCNC: 7.6 G/DL — LOW (ref 13–17)
HGB BLD-MCNC: 7.7 G/DL — LOW (ref 13–17)
LDH SERPL L TO P-CCNC: 399 U/L — HIGH (ref 50–242)
MCHC RBC-ENTMCNC: 32.3 PG — SIGNIFICANT CHANGE UP (ref 27–34)
MCHC RBC-ENTMCNC: 32.5 PG — SIGNIFICANT CHANGE UP (ref 27–34)
MCHC RBC-ENTMCNC: 32.7 PG — SIGNIFICANT CHANGE UP (ref 27–34)
MCHC RBC-ENTMCNC: 34.8 GM/DL — SIGNIFICANT CHANGE UP (ref 32–36)
MCHC RBC-ENTMCNC: 35.2 GM/DL — SIGNIFICANT CHANGE UP (ref 32–36)
MCHC RBC-ENTMCNC: 35.3 GM/DL — SIGNIFICANT CHANGE UP (ref 32–36)
MCV RBC AUTO: 92.1 FL — SIGNIFICANT CHANGE UP (ref 80–100)
MCV RBC AUTO: 92.8 FL — SIGNIFICANT CHANGE UP (ref 80–100)
MCV RBC AUTO: 92.8 FL — SIGNIFICANT CHANGE UP (ref 80–100)
PLATELET # BLD AUTO: 104 K/UL — LOW (ref 150–400)
PLATELET # BLD AUTO: 111 K/UL — LOW (ref 150–400)
PLATELET # BLD AUTO: 114 K/UL — LOW (ref 150–400)
POTASSIUM SERPL-MCNC: 4.5 MMOL/L — SIGNIFICANT CHANGE UP (ref 3.5–5.3)
POTASSIUM SERPL-SCNC: 4.5 MMOL/L — SIGNIFICANT CHANGE UP (ref 3.5–5.3)
RBC # BLD: 1.99 M/UL — LOW (ref 4.2–5.8)
RBC # BLD: 2.34 M/UL — LOW (ref 4.2–5.8)
RBC # BLD: 2.34 M/UL — LOW (ref 4.2–5.8)
RBC # FLD: 14.3 % — SIGNIFICANT CHANGE UP (ref 10.3–14.5)
RBC # FLD: 14.9 % — HIGH (ref 10.3–14.5)
RBC # FLD: 14.9 % — HIGH (ref 10.3–14.5)
SODIUM SERPL-SCNC: 144 MMOL/L — SIGNIFICANT CHANGE UP (ref 135–145)
WBC # BLD: 12.8 K/UL — HIGH (ref 3.8–10.5)
WBC # BLD: 14.7 K/UL — HIGH (ref 3.8–10.5)
WBC # BLD: 15.5 K/UL — HIGH (ref 3.8–10.5)
WBC # FLD AUTO: 12.8 K/UL — HIGH (ref 3.8–10.5)
WBC # FLD AUTO: 14.7 K/UL — HIGH (ref 3.8–10.5)
WBC # FLD AUTO: 15.5 K/UL — HIGH (ref 3.8–10.5)

## 2018-02-12 PROCEDURE — 99291 CRITICAL CARE FIRST HOUR: CPT

## 2018-02-12 PROCEDURE — 99291 CRITICAL CARE FIRST HOUR: CPT | Mod: 25

## 2018-02-12 PROCEDURE — 99232 SBSQ HOSP IP/OBS MODERATE 35: CPT

## 2018-02-12 PROCEDURE — 93750 INTERROGATION VAD IN PERSON: CPT

## 2018-02-12 PROCEDURE — 99292 CRITICAL CARE ADDL 30 MIN: CPT

## 2018-02-12 PROCEDURE — 71045 X-RAY EXAM CHEST 1 VIEW: CPT | Mod: 26

## 2018-02-12 PROCEDURE — 90832 PSYTX W PT 30 MINUTES: CPT

## 2018-02-12 PROCEDURE — 99233 SBSQ HOSP IP/OBS HIGH 50: CPT | Mod: GC

## 2018-02-12 RX ORDER — ALBUMIN HUMAN 25 %
250 VIAL (ML) INTRAVENOUS ONCE
Qty: 0 | Refills: 0 | Status: DISCONTINUED | OUTPATIENT
Start: 2018-02-12 | End: 2018-02-12

## 2018-02-12 RX ORDER — ALBUMIN HUMAN 25 %
250 VIAL (ML) INTRAVENOUS
Qty: 0 | Refills: 0 | Status: COMPLETED | OUTPATIENT
Start: 2018-02-12 | End: 2018-02-12

## 2018-02-12 RX ADMIN — MEXILETINE HYDROCHLORIDE 150 MILLIGRAM(S): 150 CAPSULE ORAL at 05:48

## 2018-02-12 RX ADMIN — Medication 500 MILLIGRAM(S): at 05:48

## 2018-02-12 RX ADMIN — Medication 500 MILLILITER(S): at 12:05

## 2018-02-12 RX ADMIN — POLYETHYLENE GLYCOL 3350 17 GRAM(S): 17 POWDER, FOR SOLUTION ORAL at 12:05

## 2018-02-12 RX ADMIN — Medication 500 MILLIGRAM(S): at 16:59

## 2018-02-12 RX ADMIN — Medication 100 MILLIGRAM(S): at 21:25

## 2018-02-12 RX ADMIN — PANTOPRAZOLE SODIUM 10 MG/HR: 20 TABLET, DELAYED RELEASE ORAL at 13:34

## 2018-02-12 RX ADMIN — Medication 325 MILLIGRAM(S): at 12:05

## 2018-02-12 RX ADMIN — CEFEPIME 100 MILLIGRAM(S): 1 INJECTION, POWDER, FOR SOLUTION INTRAMUSCULAR; INTRAVENOUS at 05:48

## 2018-02-12 RX ADMIN — MEXILETINE HYDROCHLORIDE 150 MILLIGRAM(S): 150 CAPSULE ORAL at 17:00

## 2018-02-12 RX ADMIN — SENNA PLUS 2 TABLET(S): 8.6 TABLET ORAL at 21:25

## 2018-02-12 RX ADMIN — QUETIAPINE FUMARATE 50 MILLIGRAM(S): 200 TABLET, FILM COATED ORAL at 21:25

## 2018-02-12 RX ADMIN — Medication 1 APPLICATION(S): at 05:55

## 2018-02-12 RX ADMIN — Medication 500 MILLILITER(S): at 12:56

## 2018-02-12 NOTE — PROGRESS NOTE ADULT - PROBLEM SELECTOR PLAN 1
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   No evidence of joshua pump malfunction at this time.

## 2018-02-12 NOTE — PROGRESS NOTE ADULT - SUBJECTIVE AND OBJECTIVE BOX
BILLY RUSSELL   MRN#: [id number]     The patient is a 67y Male who was seen, evaluated, & examined with the CTICU staff on rounds and later in the day with a multidisciplinary care plan formulated & implemented.  All available clinical, laboratory, radiographic, pharmacologic, and electrocardiographic data were reviewed & analyzed.      The patient was in the CTICU in critical condition secondary to acute upper GI bleed-hemorrhagic shock, persistent cardiopulmonary dysfunction, and cardiogenic shock-cardiovascular dysfunction-S/P LAVD.      Respiratory status required supplemental oxygen, the following of ABG’s with A-line monitoring, continuous pulse oximetry monitoring, and an IV Propofol infusion for support & to evaluate for & prevent further decompensation secondary to acute upper GI bleed-hemorrhagic shock-need for upper endoscopy, persistent cardiopulmonary dysfunction and cardiogenic shock-cardiovascular dysfunction-S/P LVAD.     Invasive hemodynamic monitoring with an A-line was required for the following of continuous MAP/BP monitoring to ensure adequate cardiovascular support and to evaluate for & help prevent decompensation while receiving intermittent volume expansion, an IV Protonix drip, and an IV Vasopressin drip secondary to acute upper GI bleed-hemorrhagic shock, cardiogenic shock-cardiovascular dysfunction-S/P LVAD, and acute blood loss anemia.       Patient required critical care management and I provided 110 minutes of non-continuous care to the patient.  Discussed at length with the CTICU staff and helped coordinate care.

## 2018-02-12 NOTE — PROGRESS NOTE ADULT - PROBLEM SELECTOR PLAN 5
Improving. No joshua evidence of infection per ID.   Antibiotics discontinued. Will observe closely.  Leukocytosis possibly related to hemorrhage and shock.

## 2018-02-12 NOTE — PROGRESS NOTE ADULT - ASSESSMENT
Imp:  While it is a diagnosis of exclusion, I do think that the WBC elevation is a reactive leukocytosis from the GI bleed.  Does not appear infected--If he were I'd expect him to be far sicker and he is not acutely ill.  He has been afebrile and the BC are negative.    antibiotics were discontinued on 2/10 and he appears stable from an ID perspective    Will monitor of antimicrobials at this point      Gary Lujan MD  [phone]  After 5pm/weekends [phone]

## 2018-02-12 NOTE — PROGRESS NOTE ADULT - ASSESSMENT
Mr. 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 He was admitted for probable pump thrombosis characterized by elevated LDH in the setting of being off anticoagulation for several months. 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 He was treated with APC over the weekend and required blood transfusions. He had acute leukocytosis and was treated with antibiotics, but had no clear source of infection. Currently he is stable, but is dizzy with standing and on low dose vasopressin.     Please call me with questions at [phone]. Plan discussed with CTU team and Dr. Parker.

## 2018-02-12 NOTE — PROGRESS NOTE ADULT - SUBJECTIVE AND OBJECTIVE BOX
Behavioral Cardiology Progress Note    HPI:  Mr. Baez is a 67 year old man with Chronic Systolic Heart Failure (ACC/AHA Stage D) due to NICMP s/p LVAD 6/12/17, GIB s/p Cautery (7/25/2017), known AV Malformations, Chronic Anemia due to numerous GIBs (off AC), A-Fib, VT s/p AICD. Pt admitted due to elevated LDH level of 646 for further evaluation and observation.      Behavioral Health Assessment:     Current stressors:   Unknown status on heart transplant list  Hospitalization   Adjustment to living with LVAD      Support system/family support: Good support from family and close friend      Coping strategies: Talking with family and staff, watching TV, his pastora, talking to his granddaughter     Understanding of medical illness and treatment plan:  Understands reasons for this admission and treatment plan.  Good understanding of LVAD management; benefiting from ongoing education and review.  Has been able to teach back information on heart transplant (medication, diet, need for close follow-up).     MSE: Pt seen lying chair. A&Ox3, yawning at times.  Fairly related with good eye contact alternated with having eyes closed.  Thought process goal directed.  Did not appear receptive to speaking much today.  No abnormal thought content; denied SI.  Mood frustrated and down.  Affect restricted.  Insight and judgment adequate.

## 2018-02-12 NOTE — PROGRESS NOTE ADULT - ATTENDING COMMENTS
Agree with Psychology Extern's assessment.  When seen later this evening, patient more upbeat, less frustrated.  Was given news of active status on HT list.  Feeling hopeful and optimistic.      Marga Martines, PhD  [phone]

## 2018-02-12 NOTE — PROGRESS NOTE ADULT - SUBJECTIVE AND OBJECTIVE BOX
Chief Complaint:  Patient is a 67y old  Male who presents with a chief complaint of Elevated LDH levels (2018 19:36)      Interval Events:  Mr. Baez is s/p enteroscopy with APC of distal duodenal angioectasia with subsequently cessation of bleeding.  Patient has not had a bowel movement overnight and no overt GI bleeding per CTICU team.     Allergies:  No Known Allergies      Hospital Medications:  acetaminophen   Tablet 650 milliGRAM(s) Oral every 6 hours PRN  ascorbic acid 500 milliGRAM(s) Oral two times a day  docusate sodium 100 milliGRAM(s) Oral three times a day  ferrous    sulfate 325 milliGRAM(s) Oral daily  hydrocortisone 2.5% Cream 1 Application(s) Topical two times a day  mexiletine 150 milliGRAM(s) Oral two times a day  norepinephrine Infusion 0.014 MICROgram(s)/kG/Min IV Continuous <Continuous>  pantoprazole Infusion 8 mG/Hr IV Continuous <Continuous>  polyethylene glycol 3350 17 Gram(s) Oral daily  QUEtiapine 50 milliGRAM(s) Oral at bedtime  senna 2 Tablet(s) Oral at bedtime  vasopressin Infusion 0.033 Unit(s)/Min IV Continuous <Continuous>      PMHX/PSHX:  GIB (gastrointestinal bleeding)  H/O prior ablation treatment  Ventricular fibrillation  PAF (paroxysmal atrial fibrillation)  Non-Ischemic Cardiomyopathy  SVT (Supraventricular Tachycardia)  HTN  CHF (Congestive Heart Failure)  LVAD (left ventricular assist device) present  Status post left hip replacement  Hypertension  Hypertension  History of Prior Ablation Treatment  AICD (Automatic Cardioverter/Defibrillator) Present      Family history:  No pertinent family history in first degree relatives      ROS:     General:  No wt loss, fevers, chills, night sweats, fatigue,   Eyes:  Good vision, no reported pain  ENT:  No sore throat, pain, runny nose, dysphagia  CV:  No pain, palpitations, hypo/hypertension  Resp:  No dyspnea, cough, tachypnea, wheezing  GI:  See HPI  :  No pain, bleeding, incontinence, nocturia  Muscle:  No pain, weakness  Neuro:  No weakness, tingling, memory problems  Psych:  No fatigue, insomnia, mood problems, depression  Endocrine:  No polyuria, polydipsia, cold/heat intolerance  Heme:  No petechiae, ecchymosis, easy bruisability  Skin:  No rash, edema      PHYSICAL EXAM:     GENERAL:  Appears stated age, well-groomed, well-nourished, no distress  HEENT:  NC/AT,  conjunctivae clear, sclera -anicteric  CHEST:  Full & symmetric excursion, no increased effort  ABDOMEN:  Soft, non-tender, non-distended, normoactive bowel sounds  EXTREMITIES:  no cyanosis,clubbing or edema  SKIN:  No rash  NEURO:  Alert, oriented    Vital Signs:  Vital Signs Last 24 Hrs  T(C): 36.7 (2018 05:00), Max: 36.7 (2018 00:00)  T(F): 98.1 (2018 05:00), Max: 98.1 (2018 00:00)  HR: 79 (2018 07:00) (67 - 108)  BP: --  BP(mean): --  RR: 14 (2018 07:00) (7 - 46)  SpO2: 98% (2018 07:00) (86% - 100%)  Daily     Daily Weight in k.1 (2018 02:00)    LABS:                        7.7    14.7  )-----------( 114      ( 2018 06:25 )             21.7     02-12    144  |  113<H>  |  51<H>  ----------------------------<  115<H>  4.5   |  22  |  1.32<H>    Ca    7.8<L>      2018 00:50  Phos  2.9     -11  Mg     2.6     -11    TPro  5.0<L>  /  Alb  2.6<L>  /  TBili  0.3  /  DBili  x   /  AST  23  /  ALT  12  /  AlkPhos  37<L>  02-11    LIVER FUNCTIONS - ( 2018 01:56 )  Alb: 2.6 g/dL / Pro: 5.0 g/dL / ALK PHOS: 37 U/L / ALT: 12 U/L RC / AST: 23 U/L / GGT: x           PT/INR - ( 2018 01:56 )   PT: 21.9 sec;   INR: 1.98 ratio         PTT - ( 2018 01:56 )  PTT:30.2 sec        Imaging:  no new imaging    Enteroscopy report pending but brief note:  Impression:   - Normal esophagus and stomach  - Red clotted blood in entire small bowel  - Multiple red spots seen in duodenum and jejunum, four sites with red spots were cauterized using gold probe  - One actively oozing angioectasia seen in distal duodenum, it was cauterized. This is likely the source of bleeding

## 2018-02-12 NOTE — PROGRESS NOTE ADULT - PROBLEM SELECTOR PLAN 4
Will attempt to minimize transfusions to avoid sensitization.   Currently no evidence of active bleeding.   Unable to tolerate anticoagulants.

## 2018-02-12 NOTE — PROGRESS NOTE ADULT - ASSESSMENT
Patient complained of sore throat when swallowing (s/p endoscopy), not being able to eat or use the toilet, and dizziness upon standing.  Appetite good.  Reported that he sleeps a lot but remains tired.  Patient expressed that he is unsure of his status on the heart transplant list but is still hopeful about it.  He expressed particular frustration and confusion with his apparent worsening health, as he felt better upon admission than he does currently.  Patient also complained of his burning in his eyes during the meeting, which was relieved by patient wiping his eyes with a warm, damp towel, provided by RN.  Patient reported that he is not currently engaging in physical activity.           DX:  Systolic heart failure; r/o Adjustment disorder     Recommendations:   Benefits from ongoing education with teach back of heart transplant education   Behavioral Cardiology will follow as needed    Rosio Dacosta M.A.  Psychology Extern

## 2018-02-12 NOTE — PROGRESS NOTE ADULT - SUBJECTIVE AND OBJECTIVE BOX
INFECTIOUS DISEASES FOLLOW UP-- Sujey Lujan  [phone]    This is a follow up note for this  67yMale with  Heart replaced by heart assist device      ROS:  CONSTITUTIONAL:  No fever, sleepy but answers questions,   CARDIOVASCULAR:  No chest pain or palpitations  RESPIRATORY:  No dyspnea  GASTROINTESTINAL:  No nausea, vomiting, diarrhea, or abdominal pain  GENITOURINARY:  No dysuria, diaz placed  NEUROLOGIC:  No headache,     Allergies    No Known Allergies    Intolerances        ANTIBIOTICS/RELEVANT:  antimicrobials    immunologic:    OTHER:  acetaminophen   Tablet 650 milliGRAM(s) Oral every 6 hours PRN  ascorbic acid 500 milliGRAM(s) Oral two times a day  docusate sodium 100 milliGRAM(s) Oral three times a day  ferrous    sulfate 325 milliGRAM(s) Oral daily  hydrocortisone 2.5% Cream 1 Application(s) Topical two times a day  mexiletine 150 milliGRAM(s) Oral two times a day  norepinephrine Infusion 0.014 MICROgram(s)/kG/Min IV Continuous <Continuous>  pantoprazole Infusion 8 mG/Hr IV Continuous <Continuous>  polyethylene glycol 3350 17 Gram(s) Oral daily  QUEtiapine 50 milliGRAM(s) Oral at bedtime  senna 2 Tablet(s) Oral at bedtime  vasopressin Infusion 0.033 Unit(s)/Min IV Continuous <Continuous>      Objective:  Vital Signs Last 24 Hrs  T(C): 36.4 (12 Feb 2018 12:00), Max: 37 (12 Feb 2018 08:00)  T(F): 97.6 (12 Feb 2018 12:00), Max: 98.6 (12 Feb 2018 08:00)  HR: 87 (12 Feb 2018 15:45) (67 - 98)  BP: --  BP(mean): --  RR: 18 (12 Feb 2018 15:45) (10 - 46)  SpO2: 100% (12 Feb 2018 15:45) (68% - 100%)    PHYSICAL EXAM:  Constitutional:no acute distress  Eyes:WALT, EOMI  Ear/Nose/Throat: no oral lesions, 	  Respiratory: clear BL anteriorly, decreased at bases  Cardiovascular: LVAD sounds  LVAD dressing dry and intact  Gastrointestinal:soft, (+) BS, no tenderness, distended  diaz catheter with yellow urine  Extremities:no e/e/c  No Lymphadenopathy  IV sites not inflammed.    LABS:                        7.7    14.7  )-----------( 114      ( 12 Feb 2018 06:25 )             21.7     02-12    144  |  113<H>  |  51<H>  ----------------------------<  115<H>  4.5   |  22  |  1.32<H>    Ca    7.8<L>      12 Feb 2018 00:50  Phos  2.9     02-11  Mg     2.6     02-11    TPro  5.0<L>  /  Alb  2.6<L>  /  TBili  0.3  /  DBili  x   /  AST  23  /  ALT  12  /  AlkPhos  37<L>  02-11    PT/INR - ( 11 Feb 2018 01:56 )   PT: 21.9 sec;   INR: 1.98 ratio         PTT - ( 11 Feb 2018 01:56 )  PTT:30.2 sec      MICROBIOLOGY:            RECENT CULTURES:  02-09 @ 16:40  .Blood Blood-Peripheral  --  --  --    No growth to date.  --  02-09 @ 01:05  .Blood Blood-Venous  --  --  --    No growth to date.  --      RADIOLOGY & ADDITIONAL STUDIES:    < from: Xray Chest 1 View- PORTABLE-Routine (02.12.18 @ 03:40) >  mpression:    The heart is slightly enlarged. The lungs are clear. Left ventricular   assisted device is in good position. All life supporting devices are in   good position and unchanged when compared to previous study done February 11, 2018.    < end of copied text >

## 2018-02-12 NOTE — PROGRESS NOTE ADULT - ASSESSMENT
Impression:  67 male with PMH Chronic Systolic Heart Failure (ACC/AHA Stage D) due to NICMP s/p LVAD 6/12/17, GIB s/p Cautery (7/25/2017), known AV Malformations with multiple enteroscopies last year, A-Fib, VT s/p AICD. Pt admitted due to elevated LDH level of 646 for further evaluation and observation.    Problem List:  1) Anemia 2/2 bleeding small bowel angioectasias - s/p enteroscopy on 2/9 with distal duodenal/Jejunal blood clot, NM GI bleed scan on 2/10 with bleeding in proximal duodenum, and repeat enteroscopy 2/11 with APC of bleeding duodenal angioectasia  2) NICM s/p LVAD  3) Afib  4) VT s/p AICD      Recommend:  - s/p enteroscopy yesterday evening, Hgb remains stable  - please trend CBC, transfuse as needed to goal Hb as per cardiology  - clear liquid diet   - if patient rebleeds, recommend IR consult for embolization  - supportive care as per primary team Impression:  67 male with PMH Chronic Systolic Heart Failure (ACC/AHA Stage D) due to NICMP s/p LVAD 6/12/17, GIB s/p Cautery (7/25/2017), known AV Malformations with multiple enteroscopies last year, A-Fib, VT s/p AICD. Pt admitted due to elevated LDH level of 646 for further evaluation and observation.    Problem List:  1) Anemia 2/2 bleeding small bowel angioectasias - s/p enteroscopy 2/11 with APC for 4 AVM   2) NICM s/p LVAD  3) Afib  4) VT s/p AICD      Recommend:  - please trend CBC, transfuse as needed to goal Hb as per cardiology  - clear liquid diet   - if patient rebleeds, recommend IR consult for embolization  - supportive care as per primary team

## 2018-02-12 NOTE — PROGRESS NOTE ADULT - PROBLEM SELECTOR PLAN 2
Resolved. Serum K is 4.9 today. Continue to monitor serum K level. Resolved. Serum K is 4.2 today. Continue to monitor serum K level.

## 2018-02-12 NOTE — PROGRESS NOTE ADULT - SUBJECTIVE AND OBJECTIVE BOX
[facility] Division of Kidney Diseases & Hypertension  FOLLOW UP NOTE  [phone]--------------------------------------------------------------------------------    HPI: 67 year old male with h/o systolic CHF s/p LVAD on 6/12/17, GIB, HTN, A.fib, V tach s/p AICD was admitted on 2/1/18 for LVAD thrombosis and worsening anemia. Patient was found to have NORMAN due to obstructive uropathy. Patient is currently on IV pressors. He denies complains of SOB, CP, abdominal pain.     PAST HISTORY  --------------------------------------------------------------------------------  No significant changes to PMH, PSH, FHx, SHx, unless otherwise noted    ALLERGIES & MEDICATIONS  --------------------------------------------------------------------------------  Allergies    No Known Allergies    Intolerances      Standing Inpatient Medications  ascorbic acid 500 milliGRAM(s) Oral two times a day  docusate sodium 100 milliGRAM(s) Oral three times a day  ferrous    sulfate 325 milliGRAM(s) Oral daily  hydrocortisone 2.5% Cream 1 Application(s) Topical two times a day  mexiletine 150 milliGRAM(s) Oral two times a day  norepinephrine Infusion 0.014 MICROgram(s)/kG/Min IV Continuous <Continuous>  pantoprazole Infusion 8 mG/Hr IV Continuous <Continuous>  polyethylene glycol 3350 17 Gram(s) Oral daily  QUEtiapine 50 milliGRAM(s) Oral at bedtime  senna 2 Tablet(s) Oral at bedtime  vasopressin Infusion 0.033 Unit(s)/Min IV Continuous <Continuous>    PRN Inpatient Medications  acetaminophen   Tablet 650 milliGRAM(s) Oral every 6 hours PRN      REVIEW OF SYSTEMS  --------------------------------------------------------------------------------  Gen: Fatigue +  Respiratory: No dyspnea  CV: No chest pain  GI: No abdominal pain  MSK: no edema  Neuro: No dizziness/lightheadedness      All other systems were reviewed and are negative, except as noted.    VITALS/PHYSICAL EXAM  --------------------------------------------------------------------------------  T(C): 36.7 (02-12-18 @ 05:00), Max: 36.7 (02-12-18 @ 00:00)  HR: 83 (02-12-18 @ 09:05) (67 - 108)  BP: --  RR: 17 (02-12-18 @ 09:05) (7 - 46)  SpO2: 100% (02-12-18 @ 09:05) (86% - 100%)  Wt(kg): --        02-11-18 @ 07:01  -  02-12-18 @ 07:00  --------------------------------------------------------  IN: 890.6 mL / OUT: 2990 mL / NET: -2099.4 mL      Physical Exam:  	Gen: NAD, well-appearing  	HEENT: PERRL, supple neck, clear oropharynx  	Pulm: CTA B/L  	CV: RRR, S1S2;  	Back: No spinal or CVA tenderness  	Abd: +BS, soft, nontender/nondistended  	: No suprapubic tenderness                      Extremities: no bilateral LE edema noted.                       Neuro: No focal deficits, intact gait  	Skin: Warm, without rashes  	Vascular access:    LABS/STUDIES  --------------------------------------------------------------------------------              7.7    14.7  >-----------<  114      [02-12-18 @ 06:25]              21.7     144  |  113  |  51  ----------------------------<  115      [02-12-18 @ 00:50]  4.5   |  22  |  1.32        Ca     7.8     [02-12-18 @ 00:50]      Mg     2.6     [02-11-18 @ 01:56]      Phos  2.9     [02-11-18 @ 01:56]    TPro  5.0  /  Alb  2.6  /  TBili  0.3  /  DBili  x   /  AST  23  /  ALT  12  /  AlkPhos  37  [02-11-18 @ 01:56]    PT/INR: PT 21.9 , INR 1.98       [02-11-18 @ 01:56]  PTT: 30.2       [02-11-18 @ 01:56]          [02-12-18 @ 00:50]    Creatinine Trend:  SCr 1.32 [02-12 @ 00:50]  SCr 1.71 [02-11 @ 01:56]  SCr 2.00 [02-10 @ 01:44]  SCr 2.10 [02-09 @ 12:22]  SCr 2.12 [02-09 @ 04:09]    Urinalysis - [02-09-18 @ 12:27]      Color Yellow / Appearance Clear / SG 1.023 / pH 5.5      Gluc Negative / Ketone Negative  / Bili Negative / Urobili Negative       Blood Negative / Protein Negative / Leuk Est Negative / Nitrite Negative      RBC 0-2 / WBC 0-2 / Hyaline 5-10 / Gran  / Sq Epi  / Non Sq Epi OCC / Bacteria [facility] Division of Kidney Diseases & Hypertension  FOLLOW UP NOTE  [phone]--------------------------------------------------------------------------------    HPI: 67 year old male with h/o systolic CHF s/p LVAD on 6/12/17, GIB, HTN, A.fib, V tach s/p AICD was admitted on 2/1/18 for LVAD thrombosis and worsening anemia. Patient was found to have NORMAN due to obstructive uropathy. Patient is currently on IV pressors. He denies complains of SOB, CP, abdominal pain.     PAST HISTORY  --------------------------------------------------------------------------------  No significant changes to PMH, PSH, FHx, SHx, unless otherwise noted    ALLERGIES & MEDICATIONS  --------------------------------------------------------------------------------  Allergies    No Known Allergies    Intolerances      Standing Inpatient Medications  ascorbic acid 500 milliGRAM(s) Oral two times a day  docusate sodium 100 milliGRAM(s) Oral three times a day  ferrous    sulfate 325 milliGRAM(s) Oral daily  hydrocortisone 2.5% Cream 1 Application(s) Topical two times a day  mexiletine 150 milliGRAM(s) Oral two times a day  norepinephrine Infusion 0.014 MICROgram(s)/kG/Min IV Continuous <Continuous>  pantoprazole Infusion 8 mG/Hr IV Continuous <Continuous>  polyethylene glycol 3350 17 Gram(s) Oral daily  QUEtiapine 50 milliGRAM(s) Oral at bedtime  senna 2 Tablet(s) Oral at bedtime  vasopressin Infusion 0.033 Unit(s)/Min IV Continuous <Continuous>    PRN Inpatient Medications  acetaminophen   Tablet 650 milliGRAM(s) Oral every 6 hours PRN      REVIEW OF SYSTEMS  --------------------------------------------------------------------------------  Gen: Fatigue +  Respiratory: No dyspnea  CV: No chest pain  GI: No abdominal pain  MSK: no edema  Neuro: No dizziness/lightheadedness      All other systems were reviewed and are negative, except as noted.    VITALS/PHYSICAL EXAM  --------------------------------------------------------------------------------  T(C): 36.7 (02-12-18 @ 05:00), Max: 36.7 (02-12-18 @ 00:00)  HR: 83 (02-12-18 @ 09:05) (67 - 108)  BP: --  RR: 17 (02-12-18 @ 09:05) (7 - 46)  SpO2: 100% (02-12-18 @ 09:05) (86% - 100%)  Wt(kg): --        02-11-18 @ 07:01  -  02-12-18 @ 07:00  --------------------------------------------------------  IN: 890.6 mL / OUT: 2990 mL / NET: -2099.4 mL      Physical Exam:  	Gen: NAD, well-appearing  	HEENT: PERRL, supple neck, clear oropharynx  	Pulm: CTA B/L  	CV: RRR, S1S2;  	Back: No spinal or CVA tenderness  	Abd: +BS, soft, nontender/nondistended  	: No suprapubic tenderness                      Extremities: no bilateral LE edema noted.                       Neuro: No focal deficits, intact gait  	Skin: Warm, without rashes    LABS/STUDIES  --------------------------------------------------------------------------------              7.7    14.7  >-----------<  114      [02-12-18 @ 06:25]              21.7     144  |  113  |  51  ----------------------------<  115      [02-12-18 @ 00:50]  4.5   |  22  |  1.32        Ca     7.8     [02-12-18 @ 00:50]      Mg     2.6     [02-11-18 @ 01:56]      Phos  2.9     [02-11-18 @ 01:56]    TPro  5.0  /  Alb  2.6  /  TBili  0.3  /  DBili  x   /  AST  23  /  ALT  12  /  AlkPhos  37  [02-11-18 @ 01:56]    PT/INR: PT 21.9 , INR 1.98       [02-11-18 @ 01:56]  PTT: 30.2       [02-11-18 @ 01:56]          [02-12-18 @ 00:50]    Creatinine Trend:  SCr 1.32 [02-12 @ 00:50]  SCr 1.71 [02-11 @ 01:56]  SCr 2.00 [02-10 @ 01:44]  SCr 2.10 [02-09 @ 12:22]  SCr 2.12 [02-09 @ 04:09]    Urinalysis - [02-09-18 @ 12:27]      Color Yellow / Appearance Clear / SG 1.023 / pH 5.5      Gluc Negative / Ketone Negative  / Bili Negative / Urobili Negative       Blood Negative / Protein Negative / Leuk Est Negative / Nitrite Negative      RBC 0-2 / WBC 0-2 / Hyaline 5-10 / Gran  / Sq Epi  / Non Sq Epi OCC / Bacteria [facility] Division of Kidney Diseases & Hypertension  FOLLOW UP NOTE  [phone]--------------------------------------------------------------------------------    HPI: 67 year old male with h/o systolic CHF s/p LVAD on 6/12/17, GIB, HTN, A.fib, V tach s/p AICD was admitted on 2/1/18 for LVAD thrombosis and worsening anemia. Patient was found to have NORMAN due to obstructive uropathy. Patient is currently on IV pressors. He denies complains of SOB, CP, abdominal pain.     PAST HISTORY  --------------------------------------------------------------------------------  No significant changes to PMH, PSH, FHx, SHx, unless otherwise noted    ALLERGIES & MEDICATIONS  --------------------------------------------------------------------------------  Allergies    No Known Allergies    Intolerances      Standing Inpatient Medications  ascorbic acid 500 milliGRAM(s) Oral two times a day  docusate sodium 100 milliGRAM(s) Oral three times a day  ferrous    sulfate 325 milliGRAM(s) Oral daily  hydrocortisone 2.5% Cream 1 Application(s) Topical two times a day  mexiletine 150 milliGRAM(s) Oral two times a day  norepinephrine Infusion 0.014 MICROgram(s)/kG/Min IV Continuous <Continuous>  pantoprazole Infusion 8 mG/Hr IV Continuous <Continuous>  polyethylene glycol 3350 17 Gram(s) Oral daily  QUEtiapine 50 milliGRAM(s) Oral at bedtime  senna 2 Tablet(s) Oral at bedtime  vasopressin Infusion 0.033 Unit(s)/Min IV Continuous <Continuous>    PRN Inpatient Medications  acetaminophen   Tablet 650 milliGRAM(s) Oral every 6 hours PRN      REVIEW OF SYSTEMS  --------------------------------------------------------------------------------  Gen: Fatigue +  Respiratory: No dyspnea  CV: No chest pain  GI: No abdominal pain  MSK: no edema  Neuro: No dizziness/lightheadedness      All other systems were reviewed and are negative, except as noted.    VITALS/PHYSICAL EXAM  --------------------------------------------------------------------------------  T(C): 36.7 (02-12-18 @ 05:00), Max: 36.7 (02-12-18 @ 00:00)  HR: 83 (02-12-18 @ 09:05) (67 - 108)  BP: --  RR: 17 (02-12-18 @ 09:05) (7 - 46)  SpO2: 100% (02-12-18 @ 09:05) (86% - 100%)  Wt(kg): --        02-11-18 @ 07:01  -  02-12-18 @ 07:00  --------------------------------------------------------  IN: 890.6 mL / OUT: 2990 mL / NET: -2099.4 mL      Physical Exam:  	             Gen: NAD  	HEENT: no JVD  	Pulm: CTA B/L  	CV: RRR, S1S2  	Abd: +BS, soft, nontender/nondistended              Extremities: no bilateral LE edema noted.               Neuro: No focal deficits  	Skin: Warm and dry     LABS/STUDIES  --------------------------------------------------------------------------------              7.7    14.7  >-----------<  114      [02-12-18 @ 06:25]              21.7     144  |  113  |  51  ----------------------------<  115      [02-12-18 @ 00:50]  4.5   |  22  |  1.32        Ca     7.8     [02-12-18 @ 00:50]      Mg     2.6     [02-11-18 @ 01:56]      Phos  2.9     [02-11-18 @ 01:56]    TPro  5.0  /  Alb  2.6  /  TBili  0.3  /  DBili  x   /  AST  23  /  ALT  12  /  AlkPhos  37  [02-11-18 @ 01:56]    PT/INR: PT 21.9 , INR 1.98       [02-11-18 @ 01:56]  PTT: 30.2       [02-11-18 @ 01:56]          [02-12-18 @ 00:50]    Creatinine Trend:  SCr 1.32 [02-12 @ 00:50]  SCr 1.71 [02-11 @ 01:56]  SCr 2.00 [02-10 @ 01:44]  SCr 2.10 [02-09 @ 12:22]  SCr 2.12 [02-09 @ 04:09]    Urinalysis - [02-09-18 @ 12:27]      Color Yellow / Appearance Clear / SG 1.023 / pH 5.5      Gluc Negative / Ketone Negative  / Bili Negative / Urobili Negative       Blood Negative / Protein Negative / Leuk Est Negative / Nitrite Negative      RBC 0-2 / WBC 0-2 / Hyaline 5-10 / Gran  / Sq Epi  / Non Sq Epi OCC / Bacteria [facility] Division of Kidney Diseases & Hypertension  FOLLOW UP NOTE  [phone]--------------------------------------------------------------------------------    HPI: 67 year old male with h/o systolic CHF s/p LVAD on 6/12/17, GIB, HTN, A.fib, V tach s/p AICD was admitted on 2/1/18 for possible LVAD thrombosis and worsening anemia. Patient was found to have NORMAN due to obstructive uropathy. Patient is currently on IV pressors. He denies complains of SOB, CP, abdominal pain.     PAST HISTORY  --------------------------------------------------------------------------------  No significant changes to PMH, PSH, FHx, SHx, unless otherwise noted    ALLERGIES & MEDICATIONS  --------------------------------------------------------------------------------  Allergies    No Known Allergies    Intolerances      Standing Inpatient Medications  ascorbic acid 500 milliGRAM(s) Oral two times a day  docusate sodium 100 milliGRAM(s) Oral three times a day  ferrous    sulfate 325 milliGRAM(s) Oral daily  hydrocortisone 2.5% Cream 1 Application(s) Topical two times a day  mexiletine 150 milliGRAM(s) Oral two times a day  norepinephrine Infusion 0.014 MICROgram(s)/kG/Min IV Continuous <Continuous>  pantoprazole Infusion 8 mG/Hr IV Continuous <Continuous>  polyethylene glycol 3350 17 Gram(s) Oral daily  QUEtiapine 50 milliGRAM(s) Oral at bedtime  senna 2 Tablet(s) Oral at bedtime  vasopressin Infusion 0.033 Unit(s)/Min IV Continuous <Continuous>    PRN Inpatient Medications  acetaminophen   Tablet 650 milliGRAM(s) Oral every 6 hours PRN      REVIEW OF SYSTEMS  --------------------------------------------------------------------------------  Gen: Fatigue +  Respiratory: No dyspnea  CV: No chest pain  GI: No abdominal pain  MSK: no edema  Neuro: No dizziness/lightheadedness      All other systems were reviewed and are negative, except as noted.    VITALS/PHYSICAL EXAM  --------------------------------------------------------------------------------  T(C): 36.7 (02-12-18 @ 05:00), Max: 36.7 (02-12-18 @ 00:00)  HR: 83 (02-12-18 @ 09:05) (67 - 108)  BP: 98/80  RR: 17 (02-12-18 @ 09:05) (7 - 46)  SpO2: 100% (02-12-18 @ 09:05) (86% - 100%)  Wt(kg): --        02-11-18 @ 07:01  -  02-12-18 @ 07:00  --------------------------------------------------------  IN: 890.6 mL / OUT: 2990 mL / NET: -2099.4 mL      Physical Exam:  	             Gen: NAD  	Pulm: CTA B/L  	CV: mechanical LVAD sounds noted  	Abd: +BS, soft, nontender/nondistended              Extremities: no bilateral LE edema noted.               Neuro: No focal deficits  	Skin: Warm and dry     LABS/STUDIES  --------------------------------------------------------------------------------              7.7    14.7  >-----------<  114      [02-12-18 @ 06:25]              21.7     144  |  113  |  51  ----------------------------<  115      [02-12-18 @ 00:50]  4.5   |  22  |  1.32        Ca     7.8     [02-12-18 @ 00:50]      Mg     2.6     [02-11-18 @ 01:56]      Phos  2.9     [02-11-18 @ 01:56]    TPro  5.0  /  Alb  2.6  /  TBili  0.3  /  DBili  x   /  AST  23  /  ALT  12  /  AlkPhos  37  [02-11-18 @ 01:56]    PT/INR: PT 21.9 , INR 1.98       [02-11-18 @ 01:56]  PTT: 30.2       [02-11-18 @ 01:56]          [02-12-18 @ 00:50]    Creatinine Trend:  SCr 1.32 [02-12 @ 00:50]  SCr 1.71 [02-11 @ 01:56]  SCr 2.00 [02-10 @ 01:44]  SCr 2.10 [02-09 @ 12:22]  SCr 2.12 [02-09 @ 04:09]    Urinalysis - [02-09-18 @ 12:27]      Color Yellow / Appearance Clear / SG 1.023 / pH 5.5      Gluc Negative / Ketone Negative  / Bili Negative / Urobili Negative       Blood Negative / Protein Negative / Leuk Est Negative / Nitrite Negative      RBC 0-2 / WBC 0-2 / Hyaline 5-10 / Gran  / Sq Epi  / Non Sq Epi OCC / Bacteria

## 2018-02-12 NOTE — PROGRESS NOTE ADULT - SUBJECTIVE AND OBJECTIVE BOX
Subjective: No current complaints. He had an episode of dizziness with standing this morning. No fevers or chills. No diarrhea    Medications:  acetaminophen   Tablet 650 milliGRAM(s) Oral every 6 hours PRN  albumin human  5% IVPB 250 milliLiter(s) IV Intermittent every 30 minutes  ascorbic acid 500 milliGRAM(s) Oral two times a day  docusate sodium 100 milliGRAM(s) Oral three times a day  ferrous    sulfate 325 milliGRAM(s) Oral daily  hydrocortisone 2.5% Cream 1 Application(s) Topical two times a day  mexiletine 150 milliGRAM(s) Oral two times a day  norepinephrine Infusion 0.014 MICROgram(s)/kG/Min IV Continuous <Continuous>  pantoprazole Infusion 8 mG/Hr IV Continuous <Continuous>  polyethylene glycol 3350 17 Gram(s) Oral daily  QUEtiapine 50 milliGRAM(s) Oral at bedtime  senna 2 Tablet(s) Oral at bedtime  vasopressin Infusion 0.033 Unit(s)/Min IV Continuous <Continuous>    Physical Exam:    Vitals:  T(C): 37 (18 @ 08:00), Max: 37 (18 @ 08:00)  HR: 82 (18 @ 10:00) (67 - 96)  ABP: 98/80 (18 @ 10:00) (47/47 - 115/97)  ABP(mean): 88 (18 @ 10:00) (47 - 105)  RR: 14 (18 @ 10:00) (10 - 46)  SpO2: 95% (18 @ 10:00) (86% - 100%)     Daily Weight in k.1 (2018 02:00)    I&O's Summary    2018 07:  -  2018 07:00  --------------------------------------------------------  IN: 890.6 mL / OUT: 2990 mL / NET: -2099.4 mL    2018 07:  -  2018 11:00  --------------------------------------------------------  IN: 32 mL / OUT: 300 mL / NET: -268 mL    General: No distress. Comfortable.  HEENT: EOM intact.  Neck: Neck supple. JVP not elevated. No masses  Chest: Clear to auscultation bilaterally  CV: Typical LVAD sounds. No distal pulses  Abdomen: Soft, non-distended, non-tender  Driveline exit site: Clean, dry, and intact  Skin: No rashes or skin breakdown  Neurology: Alert and oriented times three. Sensation intact  Psych: Affect normal    LVAD Interrogation: Heart Mate II  RPMs: 9200  Power: 5.1  Flow: 6.0  PI: 7.0  Event: Infrequent PI events. No power spikes  No programming changes were made    Labs:                        7.7    14.7  )-----------( 114      ( 2018 06:25 )             21.7         144  |  113<H>  |  51<H>  ----------------------------<  115<H>  4.5   |  22  |  1.32<H>    Ca    7.8<L>      2018 00:50  Phos  2.9       Mg     2.6         TPro  5.0<L>  /  Alb  2.6<L>  /  TBili  0.3  /  DBili  x   /  AST  23  /  ALT  12  /  AlkPhos  37<L>      PT/INR - ( 2018 01:56 )   PT: 21.9 sec;   INR: 1.98 ratio      PTT - ( 2018 01:56 )  PTT:30.2 sec    Serum Pro-Brain Natriuretic Peptide: 163 pg/mL ( @ 12:22)  Lactate Dehydrogenase, Serum: 399 U/L ( @ 00:50)  Lactate Dehydrogenase, Serum: 397 U/L ( @ 01:56)  Lactate Dehydrogenase, Serum: 492 U/L (02-10 @ 01:44)  Lactate Dehydrogenase, Serum: 612 U/L ( @ 12:22)

## 2018-02-12 NOTE — PROGRESS NOTE ADULT - PROBLEM SELECTOR PLAN 8
Will change listing to status 1A as leukocytosis has improved and he has no clear evidence of infection.   Will need to repeat PRA later this week. Current PRA is 0%.

## 2018-02-13 LAB
ALBUMIN SERPL ELPH-MCNC: 3.2 G/DL — LOW (ref 3.3–5)
ALP SERPL-CCNC: 38 U/L — LOW (ref 40–120)
ALT FLD-CCNC: 9 U/L RC — LOW (ref 10–45)
ANION GAP SERPL CALC-SCNC: 11 MMOL/L — SIGNIFICANT CHANGE UP (ref 5–17)
APTT BLD: 26.7 SEC — LOW (ref 27.5–37.4)
AST SERPL-CCNC: 21 U/L — SIGNIFICANT CHANGE UP (ref 10–40)
BASOPHILS # BLD AUTO: 0 K/UL — SIGNIFICANT CHANGE UP (ref 0–0.2)
BASOPHILS NFR BLD AUTO: 0.1 % — SIGNIFICANT CHANGE UP (ref 0–2)
BILIRUB SERPL-MCNC: 0.6 MG/DL — SIGNIFICANT CHANGE UP (ref 0.2–1.2)
BUN SERPL-MCNC: 30 MG/DL — HIGH (ref 7–23)
CALCIUM SERPL-MCNC: 8.3 MG/DL — LOW (ref 8.4–10.5)
CHLORIDE SERPL-SCNC: 108 MMOL/L — SIGNIFICANT CHANGE UP (ref 96–108)
CO2 SERPL-SCNC: 22 MMOL/L — SIGNIFICANT CHANGE UP (ref 22–31)
CREAT SERPL-MCNC: 1.1 MG/DL — SIGNIFICANT CHANGE UP (ref 0.5–1.3)
EOSINOPHIL # BLD AUTO: 0.3 K/UL — SIGNIFICANT CHANGE UP (ref 0–0.5)
EOSINOPHIL NFR BLD AUTO: 3 % — SIGNIFICANT CHANGE UP (ref 0–6)
FERRITIN SERPL-MCNC: 79 NG/ML — SIGNIFICANT CHANGE UP (ref 30–400)
GAS PNL BLDA: SIGNIFICANT CHANGE UP
GLUCOSE SERPL-MCNC: 90 MG/DL — SIGNIFICANT CHANGE UP (ref 70–99)
HAPTOGLOB SERPL-MCNC: <20 MG/DL — LOW (ref 34–200)
HCT VFR BLD CALC: 18.7 % — CRITICAL LOW (ref 39–50)
HCT VFR BLD CALC: 20.3 % — CRITICAL LOW (ref 39–50)
HGB BLD-MCNC: 6.7 G/DL — CRITICAL LOW (ref 13–17)
HGB BLD-MCNC: 7 G/DL — CRITICAL LOW (ref 13–17)
INR BLD: 1.36 RATIO — HIGH (ref 0.88–1.16)
IRON SATN MFR SERPL: 12 % — LOW (ref 16–55)
IRON SATN MFR SERPL: 22 UG/DL — LOW (ref 45–165)
LDH SERPL L TO P-CCNC: 352 U/L — HIGH (ref 50–242)
LYMPHOCYTES # BLD AUTO: 1.3 K/UL — SIGNIFICANT CHANGE UP (ref 1–3.3)
LYMPHOCYTES # BLD AUTO: 12.5 % — LOW (ref 13–44)
MAGNESIUM SERPL-MCNC: 2.1 MG/DL — SIGNIFICANT CHANGE UP (ref 1.6–2.6)
MCHC RBC-ENTMCNC: 32.8 PG — SIGNIFICANT CHANGE UP (ref 27–34)
MCHC RBC-ENTMCNC: 33.5 PG — SIGNIFICANT CHANGE UP (ref 27–34)
MCHC RBC-ENTMCNC: 34.6 GM/DL — SIGNIFICANT CHANGE UP (ref 32–36)
MCHC RBC-ENTMCNC: 35.9 GM/DL — SIGNIFICANT CHANGE UP (ref 32–36)
MCV RBC AUTO: 93.4 FL — SIGNIFICANT CHANGE UP (ref 80–100)
MCV RBC AUTO: 94.7 FL — SIGNIFICANT CHANGE UP (ref 80–100)
MONOCYTES # BLD AUTO: 0.9 K/UL — SIGNIFICANT CHANGE UP (ref 0–0.9)
MONOCYTES NFR BLD AUTO: 9.3 % — SIGNIFICANT CHANGE UP (ref 2–14)
NEUTROPHILS # BLD AUTO: 7.6 K/UL — HIGH (ref 1.8–7.4)
NEUTROPHILS NFR BLD AUTO: 75.2 % — SIGNIFICANT CHANGE UP (ref 43–77)
PLATELET # BLD AUTO: 123 K/UL — LOW (ref 150–400)
PLATELET # BLD AUTO: 142 K/UL — LOW (ref 150–400)
POTASSIUM SERPL-MCNC: 4.1 MMOL/L — SIGNIFICANT CHANGE UP (ref 3.5–5.3)
POTASSIUM SERPL-SCNC: 4.1 MMOL/L — SIGNIFICANT CHANGE UP (ref 3.5–5.3)
PROT SERPL-MCNC: 5.7 G/DL — LOW (ref 6–8.3)
PROTHROM AB SERPL-ACNC: 14.9 SEC — HIGH (ref 9.8–12.7)
RBC # BLD: 2 M/UL — LOW (ref 4.2–5.8)
RBC # BLD: 2.14 M/UL — LOW (ref 4.2–5.8)
RBC # FLD: 15.3 % — HIGH (ref 10.3–14.5)
RBC # FLD: 16.5 % — HIGH (ref 10.3–14.5)
SODIUM SERPL-SCNC: 141 MMOL/L — SIGNIFICANT CHANGE UP (ref 135–145)
TIBC SERPL-MCNC: 182 UG/DL — LOW (ref 220–430)
TRANSFERRIN SERPL-MCNC: 171 MG/DL — LOW (ref 200–360)
UIBC SERPL-MCNC: 160 UG/DL — SIGNIFICANT CHANGE UP (ref 110–370)
WBC # BLD: 10.1 K/UL — SIGNIFICANT CHANGE UP (ref 3.8–10.5)
WBC # BLD: 11.5 K/UL — HIGH (ref 3.8–10.5)
WBC # FLD AUTO: 10.1 K/UL — SIGNIFICANT CHANGE UP (ref 3.8–10.5)
WBC # FLD AUTO: 11.5 K/UL — HIGH (ref 3.8–10.5)

## 2018-02-13 PROCEDURE — 99232 SBSQ HOSP IP/OBS MODERATE 35: CPT

## 2018-02-13 PROCEDURE — 99233 SBSQ HOSP IP/OBS HIGH 50: CPT | Mod: GC

## 2018-02-13 PROCEDURE — 99291 CRITICAL CARE FIRST HOUR: CPT | Mod: 25

## 2018-02-13 PROCEDURE — 99291 CRITICAL CARE FIRST HOUR: CPT

## 2018-02-13 PROCEDURE — 71045 X-RAY EXAM CHEST 1 VIEW: CPT | Mod: 26

## 2018-02-13 PROCEDURE — 93750 INTERROGATION VAD IN PERSON: CPT

## 2018-02-13 PROCEDURE — 90832 PSYTX W PT 30 MINUTES: CPT

## 2018-02-13 RX ORDER — HEPATITIS B VIRUS VACCINE,RECB 20 MCG/ML
1 VIAL (ML) INTRAMUSCULAR ONCE
Qty: 0 | Refills: 0 | Status: COMPLETED | OUTPATIENT
Start: 2018-02-13 | End: 2018-02-16

## 2018-02-13 RX ORDER — PANTOPRAZOLE SODIUM 20 MG/1
40 TABLET, DELAYED RELEASE ORAL DAILY
Qty: 0 | Refills: 0 | Status: DISCONTINUED | OUTPATIENT
Start: 2018-02-13 | End: 2018-02-23

## 2018-02-13 RX ORDER — PANTOPRAZOLE SODIUM 20 MG/1
40 TABLET, DELAYED RELEASE ORAL EVERY 12 HOURS
Qty: 0 | Refills: 0 | Status: DISCONTINUED | OUTPATIENT
Start: 2018-02-13 | End: 2018-02-13

## 2018-02-13 RX ADMIN — Medication 325 MILLIGRAM(S): at 11:39

## 2018-02-13 RX ADMIN — Medication 500 MILLIGRAM(S): at 19:26

## 2018-02-13 RX ADMIN — Medication 100 MILLIGRAM(S): at 06:05

## 2018-02-13 RX ADMIN — SENNA PLUS 2 TABLET(S): 8.6 TABLET ORAL at 21:08

## 2018-02-13 RX ADMIN — Medication 1 APPLICATION(S): at 19:26

## 2018-02-13 RX ADMIN — MEXILETINE HYDROCHLORIDE 150 MILLIGRAM(S): 150 CAPSULE ORAL at 06:04

## 2018-02-13 RX ADMIN — Medication 100 MILLIGRAM(S): at 21:08

## 2018-02-13 RX ADMIN — Medication 500 MILLIGRAM(S): at 06:05

## 2018-02-13 RX ADMIN — PANTOPRAZOLE SODIUM 40 MILLIGRAM(S): 20 TABLET, DELAYED RELEASE ORAL at 11:39

## 2018-02-13 RX ADMIN — QUETIAPINE FUMARATE 50 MILLIGRAM(S): 200 TABLET, FILM COATED ORAL at 21:08

## 2018-02-13 RX ADMIN — MEXILETINE HYDROCHLORIDE 150 MILLIGRAM(S): 150 CAPSULE ORAL at 19:27

## 2018-02-13 NOTE — PROGRESS NOTE ADULT - ASSESSMENT
Imp:    Improving with WBC within normal limits at this point    Will monitor of antimicrobials at this point      Gary Lujan MD  [phone]  After 5pm/weekends [phone] Imp:    Improving with WBC within normal limits at this point    Will monitor of antimicrobials at this point    Will give another dose of HBV vaccine 20ucg for a total of 40ucg initial dose      Gary Lujan MD  [phone]  After 5pm/weekends [phone]

## 2018-02-13 NOTE — PROGRESS NOTE ADULT - PROBLEM SELECTOR PLAN 4
Will attempt to minimize transfusions to avoid sensitization.   Currently no evidence of active bleeding.   Unable to tolerate anticoagulants.  Please order iron stores. If not replete, will give IV Ferrlecit.

## 2018-02-13 NOTE — PROGRESS NOTE ADULT - PROBLEM SELECTOR PLAN 5
Improving. No joshua evidence of infection per ID.   Antibiotics discontinued. Will observe closely.  Leukocytosis possibly related to hemorrhage and shock, which have resolved.

## 2018-02-13 NOTE — PROGRESS NOTE ADULT - SUBJECTIVE AND OBJECTIVE BOX
BILLY RUSSELL   MRN#: [id number]     The patient is a 67y Male Chronic Systolic Heart Failure (ACC/AHA Stage D) due to NICMP s/p LVAD 6/12/17, GIB s/p Cautery (7/25/2017), known AV Malformations, Chronic Anemia due to numerous GIBs (off AC), A-Fib, VT s/p AICD admitted due to elevated LDH and now in CTU s/p intubation and upper endoscopy for melena and GI bleed who was seen, evaluated, & examined with the CTICU staff on rounds and later in the day with a multidisciplinary care plan formulated & implemented.  All available clinical, laboratory, radiographic, pharmacologic, and electrocardiographic data were reviewed & analyzed.      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    Respiratory status-failure required supplemental oxygen, the following of ABG’s with A-line monitoring and continuous pulse oximetry monitoring for support & to evaluate for & prevent further decompensation secondary to persistent cardiopulmonary dysfunction.    Invasive hemodynamic monitoring with a central venous and arterial catheter were required for the following of continuous central venous and MAP/BP monitoring as well as LVAD monitoring screen to ensure adequate cardiovascular support and to evaluate for & help prevent decompensation while receiving an IV Protonix drip and intermittent measurement of hemoglobin and hematocrit secondary to severe anemia related to GI bleed, cardiovascular dysfunction-S/P LVAD, and resolving acute on chronic renal insufficiency with goal of limiting blood transfusion while awaiting heart transplant.     Patient is now transitioned to daily IV protonix with discontinuation of infusion.    Patient required more than the usual postoperative critical care management and I provided 30 minutes of non-continuous care to the patient.  Discussed at length with the CTICU staff and helped coordinate care.

## 2018-02-13 NOTE — PROGRESS NOTE ADULT - SUBJECTIVE AND OBJECTIVE BOX
Chief Complaint:  Patient is a 67y old  Male who presents with a chief complaint of Elevated LDH levels (2018 19:36)      Interval Events:   Hgb dropped likely 2/2 fluids.  No overt GI bleeding.  Patient feels ok.     Allergies:  No Known Allergies      Hospital Medications:  acetaminophen   Tablet 650 milliGRAM(s) Oral every 6 hours PRN  ascorbic acid 500 milliGRAM(s) Oral two times a day  docusate sodium 100 milliGRAM(s) Oral three times a day  ferrous    sulfate 325 milliGRAM(s) Oral daily  hydrocortisone 2.5% Cream 1 Application(s) Topical two times a day  mexiletine 150 milliGRAM(s) Oral two times a day  pantoprazole  Injectable 40 milliGRAM(s) IV Push daily  polyethylene glycol 3350 17 Gram(s) Oral daily  QUEtiapine 50 milliGRAM(s) Oral at bedtime  senna 2 Tablet(s) Oral at bedtime      PMHX/PSHX:  GIB (gastrointestinal bleeding)  H/O prior ablation treatment  Ventricular fibrillation  PAF (paroxysmal atrial fibrillation)  Non-Ischemic Cardiomyopathy  SVT (Supraventricular Tachycardia)  HTN  CHF (Congestive Heart Failure)  LVAD (left ventricular assist device) present  Status post left hip replacement  Hypertension  Hypertension  History of Prior Ablation Treatment  AICD (Automatic Cardioverter/Defibrillator) Present      Family history:  No pertinent family history in first degree relatives      ROS:     General:  No wt loss, fevers, chills, night sweats, fatigue,   Eyes:  Good vision, no reported pain  ENT:  No sore throat, pain, runny nose, dysphagia  CV:  No pain, palpitations, hypo/hypertension  Resp:  No dyspnea, cough, tachypnea, wheezing  GI:  See HPI  :  No pain, bleeding, incontinence, nocturia  Muscle:  No pain, weakness  Neuro:  No weakness, tingling, memory problems  Psych:  No fatigue, insomnia, mood problems, depression  Endocrine:  No polyuria, polydipsia, cold/heat intolerance  Heme:  No petechiae, ecchymosis, easy bruisability  Skin:  No rash, edema      PHYSICAL EXAM:     GENERAL:  Appears stated age, well-groomed, well-nourished, no distress  HEENT:  NC/AT,  conjunctivae clear, sclera -anicteric  CHEST:  Full & symmetric excursion, no increased effort, breath sounds clear  HEART:  Regular rhythm, S1, S2, no murmur/rub/S3/S4,  no edema  ABDOMEN:  Soft, non-tender, non-distended, normoactive bowel sounds,  no masses ,no hepato-splenomegaly,   EXTREMITIES:  no cyanosis,clubbing or edema  SKIN:  No rash/erythema/ecchymoses/petechiae/wounds/abscess/warm/dry  NEURO:  Alert, oriented    Vital Signs:  Vital Signs Last 24 Hrs  T(C): 36.5 (2018 10:00), Max: 36.5 (2018 10:00)  T(F): 97.7 (2018 10:00), Max: 97.7 (2018 10:00)  HR: 69 (2018 11:00) (69 - 108)  BP: --  BP(mean): --  RR: 25 (2018 11:00) (10 - 51)  SpO2: 95% (2018 11:00) (68% - 100%)  Daily     Daily Weight in k.9 (2018 00:00)    LABS:                        7.0    10.1  )-----------( 142      ( 2018 11:14 )             20.3     02-13    141  |  108  |  30<H>  ----------------------------<  90  4.1   |  22  |  1.10    Ca    8.3<L>      2018 01:59  Mg     2.1     02-13    TPro  5.7<L>  /  Alb  3.2<L>  /  TBili  0.6  /  DBili  x   /  AST  21  /  ALT  9<L>  /  AlkPhos  38<L>  02-13    LIVER FUNCTIONS - ( 2018 01:59 )  Alb: 3.2 g/dL / Pro: 5.7 g/dL / ALK PHOS: 38 U/L / ALT: 9 U/L RC / AST: 21 U/L / GGT: x           PT/INR - ( 2018 01:59 )   PT: 14.9 sec;   INR: 1.36 ratio         PTT - ( 2018 01:59 )  PTT:26.7 sec        Imaging:  no new imaging

## 2018-02-13 NOTE — PROGRESS NOTE ADULT - ASSESSMENT
Feeling better today.  Ambulated in hallway with RN.  Denied having any dizziness or weakness while walking.  Mood upbeat.  Affect full range.  Good understanding of treatment plan (listed 1A for heart transplant). Brother visiting this evening.  Appetite good.  Sleeping well.            DX:  Systolic heart failure; r/o Adjustment disorder     Recommendations:   Benefits from ongoing education with teach back of heart transplant education   Behavioral Cardiology will follow as needed

## 2018-02-13 NOTE — PROGRESS NOTE ADULT - SUBJECTIVE AND OBJECTIVE BOX
[facility] Division of Kidney Diseases & Hypertension  FOLLOW UP NOTE  [phone]--------------------------------------------------------------------------------    HPI: 67 year old male with h/o systolic CHF s/p LVAD on 6/12/17, GIB, HTN, A.fib, V tach s/p AICD was admitted on 2/1/18 for possible LVAD thrombosis and worsening anemia. Patient was found to have NORMAN due to obstructive uropathy. Patient is currently on IV pressors. He denies complains of SOB, CP, abdominal pain.     PAST HISTORY  --------------------------------------------------------------------------------  No significant changes to PMH, PSH, FHx, SHx, unless otherwise noted    ALLERGIES & MEDICATIONS  --------------------------------------------------------------------------------  Allergies    No Known Allergies    Intolerances      Standing Inpatient Medications  ascorbic acid 500 milliGRAM(s) Oral two times a day  docusate sodium 100 milliGRAM(s) Oral three times a day  ferrous    sulfate 325 milliGRAM(s) Oral daily  hydrocortisone 2.5% Cream 1 Application(s) Topical two times a day  mexiletine 150 milliGRAM(s) Oral two times a day  norepinephrine Infusion 0.014 MICROgram(s)/kG/Min IV Continuous <Continuous>  pantoprazole Infusion 8 mG/Hr IV Continuous <Continuous>  polyethylene glycol 3350 17 Gram(s) Oral daily  QUEtiapine 50 milliGRAM(s) Oral at bedtime  senna 2 Tablet(s) Oral at bedtime  vasopressin Infusion 0.033 Unit(s)/Min IV Continuous <Continuous>    PRN Inpatient Medications  acetaminophen   Tablet 650 milliGRAM(s) Oral every 6 hours PRN      REVIEW OF SYSTEMS  --------------------------------------------------------------------------------    Respiratory: No dyspnea  CV: No chest pain  GI: No abdominal pain  MSK: no edema  Neuro: No dizziness/lightheadedness      VITALS/PHYSICAL EXAM  --------------------------------------------------------------------------------  T(C): 36.3 (02-13-18 @ 04:00), Max: 36.4 (02-12-18 @ 12:00)  HR: 81 (02-13-18 @ 08:00) (77 - 102)  BP: 76/62 mm Hg  RR: 27 (02-13-18 @ 08:00) (10 - 51)  SpO2: 99% (02-13-18 @ 08:00) (68% - 100%)  Wt(kg): --        02-12-18 @ 07:01  -  02-13-18 @ 07:00  --------------------------------------------------------  IN: 1507 mL / OUT: 2295 mL / NET: -788 mL    02-13-18 @ 07:01  -  02-13-18 @ 09:21  --------------------------------------------------------  IN: 260 mL / OUT: 400 mL / NET: -140 mL      Physical Exam:    	   Gen: NAD  	Pulm: CTA B/L  	CV: mechanical LVAD sounds noted  	Abd: +BS, soft, nontender/nondistended              Extremities: no bilateral LE edema noted.               Neuro: No focal deficits  	Skin: Warm and dry     LABS/STUDIES  --------------------------------------------------------------------------------              6.7    11.5  >-----------<  123      [02-13-18 @ 01:59]              18.7     141  |  108  |  30  ----------------------------<  90      [02-13-18 @ 01:59]  4.1   |  22  |  1.10        Ca     8.3     [02-13-18 @ 01:59]      Mg     2.1     [02-13-18 @ 01:59]    TPro  5.7  /  Alb  3.2  /  TBili  0.6  /  DBili  x   /  AST  21  /  ALT  9   /  AlkPhos  38  [02-13-18 @ 01:59]    PT/INR: PT 14.9 , INR 1.36       [02-13-18 @ 01:59]  PTT: 26.7       [02-13-18 @ 01:59]          [02-13-18 @ 01:59]    Creatinine Trend:  SCr 1.10 [02-13 @ 01:59]  SCr 1.32 [02-12 @ 00:50]  SCr 1.71 [02-11 @ 01:56]  SCr 2.00 [02-10 @ 01:44]  SCr 2.10 [02-09 @ 12:22]    Urinalysis - [02-09-18 @ 12:27]      Color Yellow / Appearance Clear / SG 1.023 / pH 5.5      Gluc Negative / Ketone Negative  / Bili Negative / Urobili Negative       Blood Negative / Protein Negative / Leuk Est Negative / Nitrite Negative      RBC 0-2 / WBC 0-2 / Hyaline 5-10 / Gran  / Sq Epi  / Non Sq Epi OCC / Bacteria

## 2018-02-13 NOTE — CHART NOTE - NSCHARTNOTEFT_GEN_A_CORE
Source: Patient [ x ]    Family [ ]     other [ x ] Comprehensive chart review, RN    Pt seen for nutrition follow up, sitting in chair at bedside. Reports good appetite and consuming >75% of clear liquid trays since diet was advanced yesterday. Denies nausea, vomiting, diarrhea, and constipation; reports last BM passed yesterday. Pt receptive to review/reinforcement of medical nutrition therapy for CHF, heart health, and Coumadin/Vitamin K interaction; verbalized understanding and states he is willing to make necessary changes to his diet.     Chart reviewed- pt with LVAD, admitted with increasing LDH with concern for pump thrombosis, with anemia and GI bleed; pre-syncopal episode; NORMAN due to obstructive uropathy; s/p enteroscopy on 2/11.    Diet : clear liquids     Admission Weight: 78.9kg  Current Weight: 80.9kg  Weight fluctuations noted. Pt with 2+bilateral arm edema.     Pertinent Medications: MEDICATIONS  (STANDING):  ascorbic acid 500 milliGRAM(s) Oral two times a day  docusate sodium 100 milliGRAM(s) Oral three times a day  ferrous    sulfate 325 milliGRAM(s) Oral daily  hydrocortisone 2.5% Cream 1 Application(s) Topical two times a day  mexiletine 150 milliGRAM(s) Oral two times a day  norepinephrine Infusion 0.014 MICROgram(s)/kG/Min (2 mL/Hr) IV Continuous <Continuous>  pantoprazole Infusion 8 mG/Hr (10 mL/Hr) IV Continuous <Continuous>  polyethylene glycol 3350 17 Gram(s) Oral daily  QUEtiapine 50 milliGRAM(s) Oral at bedtime  senna 2 Tablet(s) Oral at bedtime  vasopressin Infusion 0.033 Unit(s)/Min (2 mL/Hr) IV Continuous <Continuous>    MEDICATIONS  (PRN):  acetaminophen   Tablet 650 milliGRAM(s) Oral every 6 hours PRN For Temp over 38.3 C (100.94 F)    Pertinent Labs:    Complete Blood Count + Automated Diff (02.13.18 @ 01:59)    Hemoglobin: 6.7 g/dL - low    Hematocrit: 18.7 % - low  Comprehensive Metabolic Panel (02.13.18 @ 01:59)    Sodium, Serum: 141 mmol/L    Potassium, Serum: 4.1 mmol/L    Chloride, Serum: 108 mmol/L    Carbon Dioxide, Serum: 22 mmol/L    Anion Gap, Serum: 11 mmol/L    Blood Urea Nitrogen, Serum: 30 mg/dL - high    Creatinine, Serum: 1.10 mg/dL    Glucose, Serum: 90 mg/dL    Calcium, Total Serum: 8.3 mg/dL - low    Protein Total, Serum: 5.7 g/dL - low    Albumin, Serum: 3.2 g/dL - low    Bilirubin Total, Serum: 0.6 mg/dL     Alkaline Phosphatase, Serum: 38 U/L - low    Aspartate Aminotransferase (AST/SGOT): 21 U/L    Alanine Aminotransferase (ALT/SGPT): 9 U/L RC - low  Lactate Dehydrogenase, Serum (02.13.18 @ 01:59)    Lactate Dehydrogenase, Serum: 352 U/L - high    Skin: no pressure ulcers    Estimated Needs:   [ x ] no change since previous assessment  [ ] recalculated:       Previous Nutrition Diagnosis:   Limited adherence to nutrition related recommendations remains, addressed with continued diet education.  Inadequate oral intake remains.       New Nutrition Diagnosis: [ x ] not applicable      Interventions:   1. Recommend advance diet to regular, low sodium with Ensure Enlive 2 cans/day.  2. Encourage PO intake with all meals.  3. Provide food preferences within therapeutic diet when requested.   4. Reviewed/reinforced nutrition therapy for CHF, heart health, and Coumadin/Vitamin K interaction.     d/w team.       Monitoring and Evaluation:     [ x ] PO intake [ x ] Tolerance to diet prescription [ x ] weights [ x ] follow up per protocol    [ ] other:

## 2018-02-13 NOTE — PROGRESS NOTE ADULT - ASSESSMENT
Mr. 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 He was admitted for probable pump thrombosis characterized by elevated LDH in the setting of being off anticoagulation for several months. 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 He was treated with APC of an AVM in the duodenum over the weekend and required blood transfusions. He had acute leukocytosis and was treated with antibiotics, but had no clear source of infection. Currently he is stable, but anemic.     He is transplantable and is currently listed status 1A    Please call me with questions at [phone]. Plan discussed with CTU team and Dr. Parker.

## 2018-02-13 NOTE — PROGRESS NOTE ADULT - PROBLEM SELECTOR PLAN 1
In setting of obstructive uropathy: Scr. was elevated at 1.43 on 2/1/18 which then worsened to 2.11 on 2/9/18. Rosas was placed and drained about 695 cc. Patient with NORMAN secondary to obstructive uropathy ww/o component of hypovolemia in setting of GI bleed. Serum Cr. is now normal at 1.10. Continue to monitor Scr., electrolytes, and urine output. Avoid nephrotoxins.  Elevated BUN out of proportion to creatinine likely related to GI bleed.

## 2018-02-13 NOTE — PROGRESS NOTE ADULT - SUBJECTIVE AND OBJECTIVE BOX
HPI:  Mr. Baez is a 67 year old man with Chronic Systolic Heart Failure (ACC/AHA Stage D) due to NICMP s/p LVAD 6/12/17, GIB s/p Cautery (7/25/2017), known AV Malformations, Chronic Anemia due to numerous GIBs (off AC), A-Fib, VT s/p AICD. Pt admitted due to elevated LDH level of 646 for further evaluation and observation.      Behavioral Health Assessment:     Current stressors  Hospitalization   Adjustment to living with LVAD      Support system/family support: Good support from family and close friend      Coping strategies: Talking with family and staff, watching TV, his pastora, talking to his granddaughter     Understanding of medical illness and treatment plan:  Understands reasons for this admission and treatment plan.  Good understanding of LVAD management; benefiting from ongoing education and review.  Has been able to teach back information on heart transplant (medication, diet, need for close follow-up).     MSE: Pt seen resting comfortably in bed.  A&Ox3.  Well related with good eye contact.  Thought process logical, goal directed.  No abnormal thought content; denied SI.  Mood upbeat.  Affect full range. Insight and judgment adequate.

## 2018-02-13 NOTE — PROGRESS NOTE ADULT - SUBJECTIVE AND OBJECTIVE BOX
INFECTIOUS DISEASES FOLLOW UP-- Sujey Lujan  [phone]    This is a follow up note for this  67yMale with  Heart replaced by heart assist device      ROS:  CONSTITUTIONAL:  No fever, good appetite  CARDIOVASCULAR:  No chest pain or palpitations  RESPIRATORY:  No dyspnea  GASTROINTESTINAL:  No nausea, vomiting, diarrhea, or abdominal pain  GENITOURINARY:  No dysuria  NEUROLOGIC:  No headache,     Allergies    No Known Allergies    Intolerances        ANTIBIOTICS/RELEVANT:  antimicrobials    immunologic:    OTHER:  acetaminophen   Tablet 650 milliGRAM(s) Oral every 6 hours PRN  ascorbic acid 500 milliGRAM(s) Oral two times a day  docusate sodium 100 milliGRAM(s) Oral three times a day  ferrous    sulfate 325 milliGRAM(s) Oral daily  hydrocortisone 2.5% Cream 1 Application(s) Topical two times a day  mexiletine 150 milliGRAM(s) Oral two times a day  pantoprazole  Injectable 40 milliGRAM(s) IV Push daily  polyethylene glycol 3350 17 Gram(s) Oral daily  QUEtiapine 50 milliGRAM(s) Oral at bedtime  senna 2 Tablet(s) Oral at bedtime      Objective:  Vital Signs Last 24 Hrs  T(C): 36.5 (13 Feb 2018 14:00), Max: 36.5 (13 Feb 2018 10:00)  T(F): 97.7 (13 Feb 2018 14:00), Max: 97.7 (13 Feb 2018 10:00)  HR: 89 (13 Feb 2018 16:00) (69 - 127)  BP: --  BP(mean): --  RR: 23 (13 Feb 2018 16:00) (10 - 51)  SpO2: 100% (13 Feb 2018 16:00) (73% - 100%)    PHYSICAL EXAM:  Constitutional:no acute distress  Eyes:WALT, EOMI  Ear/Nose/Throat: no oral lesions, 	  Respiratory: clear BL decreased at bases  sternotomy healed   LVAd dressing dry and intact  Cardiovascular: LVAD sounds  Gastrointestinal:soft, (+) BS, no tenderness  diaz removed  Extremities:no e/e/c  No Lymphadenopathy  IV sites not inflammed.    LABS:                        7.0    10.1  )-----------( 142      ( 13 Feb 2018 11:14 )             20.3     02-13    141  |  108  |  30<H>  ----------------------------<  90  4.1   |  22  |  1.10    Ca    8.3<L>      13 Feb 2018 01:59  Mg     2.1     02-13    TPro  5.7<L>  /  Alb  3.2<L>  /  TBili  0.6  /  DBili  x   /  AST  21  /  ALT  9<L>  /  AlkPhos  38<L>  02-13    PT/INR - ( 13 Feb 2018 01:59 )   PT: 14.9 sec;   INR: 1.36 ratio         PTT - ( 13 Feb 2018 01:59 )  PTT:26.7 sec      MICROBIOLOGY:            RECENT CULTURES:  02-09 @ 16:40  .Blood Blood-Peripheral  --  --  --    No growth to date.  --  02-09 @ 01:05  .Blood Blood-Venous  --  --  --    No growth to date.  --      RADIOLOGY & ADDITIONAL STUDIES:    < from: Xray Chest 1 View- PORTABLE-Routine (02.13.18 @ 04:32) >  Impression:    The heart is normal in size. The lungs are clear. The left ventricular   assisted device is noted seen on the current study. A pacer is in good   position. A central line seen on the right and the tip is in superior   vena cava. No pneumothorax. Status poststernotomy.    < end of copied text >

## 2018-02-13 NOTE — PROGRESS NOTE ADULT - ASSESSMENT
Impression:  67 male with PMH Chronic Systolic Heart Failure (ACC/AHA Stage D) due to NICMP s/p LVAD 6/12/17, GIB s/p Cautery (7/25/2017), known AV Malformations with multiple enteroscopies last year, A-Fib, VT s/p AICD. Pt admitted due to elevated LDH level of 646 for further evaluation and observation.    Problem List:  1) Anemia 2/2 bleeding small bowel angioectasias - s/p enteroscopy 2/11 with APC for 4 AVM   2) NICM s/p LVAD  3) Afib  4) VT s/p AICD      Recommend:  - please trend CBC, transfuse as needed to goal Hb as per cardiology  - advance diet as tolerated   - if patient rebleeds, recommend IR consult for embolization  - supportive care as per primary team

## 2018-02-13 NOTE — PROGRESS NOTE ADULT - SUBJECTIVE AND OBJECTIVE BOX
Subjective: No overnight events. He feels well. No dizziness. No further melena or blood per rectum.     Medications:  acetaminophen   Tablet 650 milliGRAM(s) Oral every 6 hours PRN  ascorbic acid 500 milliGRAM(s) Oral two times a day  docusate sodium 100 milliGRAM(s) Oral three times a day  ferrous    sulfate 325 milliGRAM(s) Oral daily  hydrocortisone 2.5% Cream 1 Application(s) Topical two times a day  mexiletine 150 milliGRAM(s) Oral two times a day  norepinephrine Infusion 0.014 MICROgram(s)/kG/Min IV Continuous <Continuous>  pantoprazole Infusion 8 mG/Hr IV Continuous <Continuous>  polyethylene glycol 3350 17 Gram(s) Oral daily  QUEtiapine 50 milliGRAM(s) Oral at bedtime  senna 2 Tablet(s) Oral at bedtime  vasopressin Infusion 0.033 Unit(s)/Min IV Continuous <Continuous>      Physical Exam:    Vitals:  T(C): 36.3 (18 @ 04:00), Max: 36.4 (18 @ 12:00)  HR: 89 (18 @ 07:00) (76 - 102)  ABP: 68/54 (18 @ 07:00) (62/40 - 106/82)  ABP(mean): 61 (18 @ 07:00) (48 - 92)  RR: 17 (18 @ 07:00) (10 - 51)  SpO2: 99% (18 @ 07:00) (68% - 100%)    Daily Weight in k.9 (2018 00:00)    I&O's Summary    2018 07:01  -  2018 07:00  --------------------------------------------------------  IN: 1507 mL / OUT: 2295 mL / NET: -788 mL    General: No distress. Comfortable.  HEENT: EOM intact.  Neck: Neck supple. JVP not elevated. No masses  Chest: Clear to auscultation bilaterally  CV: Typical LVAD sounds. No distal pulses  Abdomen: Soft, non-distended, non-tender  Driveline exit site: Clean, dry, and intact  Skin: No rashes or skin breakdown  Neurology: Alert and oriented times three. Sensation intact  Psych: Affect normal    LVAD Interrogation: Heart Mate II  RPMs: 9200  Power: 6.1  Flow: 6  PI: 5.2  Event: None  No programming changes were made    Labs:                        6.7    11.5  )-----------( 123      ( 2018 01:59 )             18.7         141  |  108  |  30<H>  ----------------------------<  90  4.1   |  22  |  1.10    Ca    8.3<L>      2018 01:59  Mg     2.1         TPro  5.7<L>  /  Alb  3.2<L>  /  TBili  0.6  /  DBili  x   /  AST  21  /  ALT  9<L>  /  AlkPhos  38<L>      PT/INR - ( 2018 01:59 )   PT: 14.9 sec;   INR: 1.36 ratio      PTT - ( 2018 01:59 )  PTT:26.7 sec    Serum Pro-Brain Natriuretic Peptide: 163 pg/mL ( @ 12:22)    Lactate Dehydrogenase, Serum: 352 U/L ( @ 01:59)  Lactate Dehydrogenase, Serum: 399 U/L ( @ 00:50)  Lactate Dehydrogenase, Serum: 397 U/L ( @ 01:56)

## 2018-02-14 LAB
ALBUMIN SERPL ELPH-MCNC: 3 G/DL — LOW (ref 3.3–5)
ALBUMIN SERPL ELPH-MCNC: 3.4 G/DL — SIGNIFICANT CHANGE UP (ref 3.3–5)
ALP SERPL-CCNC: 63 U/L — SIGNIFICANT CHANGE UP (ref 40–120)
ALP SERPL-CCNC: 67 U/L — SIGNIFICANT CHANGE UP (ref 40–120)
ALT FLD-CCNC: 25 U/L RC — SIGNIFICANT CHANGE UP (ref 10–45)
ALT FLD-CCNC: 27 U/L RC — SIGNIFICANT CHANGE UP (ref 10–45)
ANION GAP SERPL CALC-SCNC: 11 MMOL/L — SIGNIFICANT CHANGE UP (ref 5–17)
ANION GAP SERPL CALC-SCNC: 12 MMOL/L — SIGNIFICANT CHANGE UP (ref 5–17)
ANION GAP SERPL CALC-SCNC: 12 MMOL/L — SIGNIFICANT CHANGE UP (ref 5–17)
AST SERPL-CCNC: 33 U/L — SIGNIFICANT CHANGE UP (ref 10–40)
AST SERPL-CCNC: 40 U/L — SIGNIFICANT CHANGE UP (ref 10–40)
BILIRUB SERPL-MCNC: 0.3 MG/DL — SIGNIFICANT CHANGE UP (ref 0.2–1.2)
BILIRUB SERPL-MCNC: 0.5 MG/DL — SIGNIFICANT CHANGE UP (ref 0.2–1.2)
BUN SERPL-MCNC: 16 MG/DL — SIGNIFICANT CHANGE UP (ref 7–23)
BUN SERPL-MCNC: 21 MG/DL — SIGNIFICANT CHANGE UP (ref 7–23)
BUN SERPL-MCNC: 25 MG/DL — HIGH (ref 7–23)
CALCIUM SERPL-MCNC: 7.8 MG/DL — LOW (ref 8.4–10.5)
CALCIUM SERPL-MCNC: 8.3 MG/DL — LOW (ref 8.4–10.5)
CALCIUM SERPL-MCNC: 8.6 MG/DL — SIGNIFICANT CHANGE UP (ref 8.4–10.5)
CHLORIDE SERPL-SCNC: 101 MMOL/L — SIGNIFICANT CHANGE UP (ref 96–108)
CHLORIDE SERPL-SCNC: 104 MMOL/L — SIGNIFICANT CHANGE UP (ref 96–108)
CHLORIDE SERPL-SCNC: 106 MMOL/L — SIGNIFICANT CHANGE UP (ref 96–108)
CO2 SERPL-SCNC: 22 MMOL/L — SIGNIFICANT CHANGE UP (ref 22–31)
CO2 SERPL-SCNC: 23 MMOL/L — SIGNIFICANT CHANGE UP (ref 22–31)
CO2 SERPL-SCNC: 23 MMOL/L — SIGNIFICANT CHANGE UP (ref 22–31)
CREAT SERPL-MCNC: 0.98 MG/DL — SIGNIFICANT CHANGE UP (ref 0.5–1.3)
CREAT SERPL-MCNC: 0.98 MG/DL — SIGNIFICANT CHANGE UP (ref 0.5–1.3)
CREAT SERPL-MCNC: 1.16 MG/DL — SIGNIFICANT CHANGE UP (ref 0.5–1.3)
CULTURE RESULTS: SIGNIFICANT CHANGE UP
GAS PNL BLDV: SIGNIFICANT CHANGE UP
GLUCOSE SERPL-MCNC: 115 MG/DL — HIGH (ref 70–99)
GLUCOSE SERPL-MCNC: 116 MG/DL — HIGH (ref 70–99)
GLUCOSE SERPL-MCNC: 99 MG/DL — SIGNIFICANT CHANGE UP (ref 70–99)
HAPTOGLOB SERPL-MCNC: <20 MG/DL — LOW (ref 34–200)
HCT VFR BLD CALC: 18.6 % — CRITICAL LOW (ref 39–50)
HCT VFR BLD CALC: 21.4 % — LOW (ref 39–50)
HCT VFR BLD CALC: 23.6 % — LOW (ref 39–50)
HGB BLD-MCNC: 6.2 G/DL — CRITICAL LOW (ref 13–17)
HGB BLD-MCNC: 7 G/DL — CRITICAL LOW (ref 13–17)
HGB BLD-MCNC: 7.9 G/DL — LOW (ref 13–17)
LDH SERPL L TO P-CCNC: 366 U/L — HIGH (ref 50–242)
LDH SERPL L TO P-CCNC: 408 U/L — HIGH (ref 50–242)
MAGNESIUM SERPL-MCNC: 1.9 MG/DL — SIGNIFICANT CHANGE UP (ref 1.6–2.6)
MAGNESIUM SERPL-MCNC: 2.2 MG/DL — SIGNIFICANT CHANGE UP (ref 1.6–2.6)
MCHC RBC-ENTMCNC: 32 PG — SIGNIFICANT CHANGE UP (ref 27–34)
MCHC RBC-ENTMCNC: 32.1 PG — SIGNIFICANT CHANGE UP (ref 27–34)
MCHC RBC-ENTMCNC: 32.4 PG — SIGNIFICANT CHANGE UP (ref 27–34)
MCHC RBC-ENTMCNC: 32.9 GM/DL — SIGNIFICANT CHANGE UP (ref 32–36)
MCHC RBC-ENTMCNC: 33.2 GM/DL — SIGNIFICANT CHANGE UP (ref 32–36)
MCHC RBC-ENTMCNC: 33.2 GM/DL — SIGNIFICANT CHANGE UP (ref 32–36)
MCV RBC AUTO: 96.2 FL — SIGNIFICANT CHANGE UP (ref 80–100)
MCV RBC AUTO: 97.5 FL — SIGNIFICANT CHANGE UP (ref 80–100)
MCV RBC AUTO: 97.6 FL — SIGNIFICANT CHANGE UP (ref 80–100)
PHOSPHATE SERPL-MCNC: 2.4 MG/DL — LOW (ref 2.5–4.5)
PHOSPHATE SERPL-MCNC: 2.5 MG/DL — SIGNIFICANT CHANGE UP (ref 2.5–4.5)
PLATELET # BLD AUTO: 145 K/UL — LOW (ref 150–400)
PLATELET # BLD AUTO: 187 K/UL — SIGNIFICANT CHANGE UP (ref 150–400)
PLATELET # BLD AUTO: 215 K/UL — SIGNIFICANT CHANGE UP (ref 150–400)
POTASSIUM SERPL-MCNC: 3.8 MMOL/L — SIGNIFICANT CHANGE UP (ref 3.5–5.3)
POTASSIUM SERPL-MCNC: 4.2 MMOL/L — SIGNIFICANT CHANGE UP (ref 3.5–5.3)
POTASSIUM SERPL-MCNC: 4.3 MMOL/L — SIGNIFICANT CHANGE UP (ref 3.5–5.3)
POTASSIUM SERPL-SCNC: 3.8 MMOL/L — SIGNIFICANT CHANGE UP (ref 3.5–5.3)
POTASSIUM SERPL-SCNC: 4.2 MMOL/L — SIGNIFICANT CHANGE UP (ref 3.5–5.3)
POTASSIUM SERPL-SCNC: 4.3 MMOL/L — SIGNIFICANT CHANGE UP (ref 3.5–5.3)
PROT SERPL-MCNC: 5.3 G/DL — LOW (ref 6–8.3)
PROT SERPL-MCNC: 6.3 G/DL — SIGNIFICANT CHANGE UP (ref 6–8.3)
RBC # BLD: 1.9 M/UL — LOW (ref 4.2–5.8)
RBC # BLD: 2.19 M/UL — LOW (ref 4.2–5.8)
RBC # BLD: 2.46 M/UL — LOW (ref 4.2–5.8)
RBC # FLD: 17.4 % — HIGH (ref 10.3–14.5)
RBC # FLD: 18 % — HIGH (ref 10.3–14.5)
RBC # FLD: 18.3 % — HIGH (ref 10.3–14.5)
SODIUM SERPL-SCNC: 134 MMOL/L — LOW (ref 135–145)
SODIUM SERPL-SCNC: 139 MMOL/L — SIGNIFICANT CHANGE UP (ref 135–145)
SODIUM SERPL-SCNC: 141 MMOL/L — SIGNIFICANT CHANGE UP (ref 135–145)
SPECIMEN SOURCE: SIGNIFICANT CHANGE UP
WBC # BLD: 10.9 K/UL — HIGH (ref 3.8–10.5)
WBC # BLD: 14.8 K/UL — HIGH (ref 3.8–10.5)
WBC # BLD: 21.1 K/UL — HIGH (ref 3.8–10.5)
WBC # FLD AUTO: 10.9 K/UL — HIGH (ref 3.8–10.5)
WBC # FLD AUTO: 14.8 K/UL — HIGH (ref 3.8–10.5)
WBC # FLD AUTO: 21.1 K/UL — HIGH (ref 3.8–10.5)

## 2018-02-14 PROCEDURE — 99291 CRITICAL CARE FIRST HOUR: CPT

## 2018-02-14 PROCEDURE — 71045 X-RAY EXAM CHEST 1 VIEW: CPT | Mod: 26

## 2018-02-14 PROCEDURE — 99232 SBSQ HOSP IP/OBS MODERATE 35: CPT

## 2018-02-14 PROCEDURE — 99292 CRITICAL CARE ADDL 30 MIN: CPT

## 2018-02-14 PROCEDURE — 93750 INTERROGATION VAD IN PERSON: CPT

## 2018-02-14 RX ORDER — CALCIUM GLUCONATE 100 MG/ML
1 VIAL (ML) INTRAVENOUS ONCE
Qty: 0 | Refills: 0 | Status: COMPLETED | OUTPATIENT
Start: 2018-02-14 | End: 2018-02-14

## 2018-02-14 RX ORDER — CARVEDILOL PHOSPHATE 80 MG/1
6.25 CAPSULE, EXTENDED RELEASE ORAL EVERY 12 HOURS
Qty: 0 | Refills: 0 | Status: DISCONTINUED | OUTPATIENT
Start: 2018-02-14 | End: 2018-02-14

## 2018-02-14 RX ORDER — IRON SUCROSE 20 MG/ML
100 INJECTION, SOLUTION INTRAVENOUS
Qty: 0 | Refills: 0 | Status: COMPLETED | OUTPATIENT
Start: 2018-02-14 | End: 2018-02-18

## 2018-02-14 RX ORDER — IRON SUCROSE 20 MG/ML
200 INJECTION, SOLUTION INTRAVENOUS EVERY 24 HOURS
Qty: 0 | Refills: 0 | Status: DISCONTINUED | OUTPATIENT
Start: 2018-02-14 | End: 2018-02-14

## 2018-02-14 RX ORDER — DIGOXIN 250 MCG
0.5 TABLET ORAL ONCE
Qty: 0 | Refills: 0 | Status: COMPLETED | OUTPATIENT
Start: 2018-02-14 | End: 2018-02-14

## 2018-02-14 RX ORDER — MAGNESIUM SULFATE 500 MG/ML
2 VIAL (ML) INJECTION ONCE
Qty: 0 | Refills: 0 | Status: COMPLETED | OUTPATIENT
Start: 2018-02-14 | End: 2018-02-14

## 2018-02-14 RX ORDER — POTASSIUM CHLORIDE 20 MEQ
10 PACKET (EA) ORAL
Qty: 0 | Refills: 0 | Status: COMPLETED | OUTPATIENT
Start: 2018-02-14 | End: 2018-02-14

## 2018-02-14 RX ORDER — MAGNESIUM SULFATE 500 MG/ML
1 VIAL (ML) INJECTION ONCE
Qty: 0 | Refills: 0 | Status: COMPLETED | OUTPATIENT
Start: 2018-02-14 | End: 2018-02-14

## 2018-02-14 RX ORDER — METOPROLOL TARTRATE 50 MG
5 TABLET ORAL ONCE
Qty: 0 | Refills: 0 | Status: COMPLETED | OUTPATIENT
Start: 2018-02-14 | End: 2018-02-14

## 2018-02-14 RX ORDER — AMIODARONE HYDROCHLORIDE 400 MG/1
150 TABLET ORAL ONCE
Qty: 0 | Refills: 0 | Status: COMPLETED | OUTPATIENT
Start: 2018-02-14 | End: 2018-02-14

## 2018-02-14 RX ORDER — HYDRALAZINE HCL 50 MG
25 TABLET ORAL EVERY 8 HOURS
Qty: 0 | Refills: 0 | Status: DISCONTINUED | OUTPATIENT
Start: 2018-02-14 | End: 2018-02-23

## 2018-02-14 RX ADMIN — PANTOPRAZOLE SODIUM 40 MILLIGRAM(S): 20 TABLET, DELAYED RELEASE ORAL at 12:02

## 2018-02-14 RX ADMIN — Medication 500 MILLIGRAM(S): at 17:27

## 2018-02-14 RX ADMIN — QUETIAPINE FUMARATE 50 MILLIGRAM(S): 200 TABLET, FILM COATED ORAL at 21:04

## 2018-02-14 RX ADMIN — Medication 50 MILLIEQUIVALENT(S): at 03:23

## 2018-02-14 RX ADMIN — Medication 50 MILLIEQUIVALENT(S): at 04:08

## 2018-02-14 RX ADMIN — Medication 325 MILLIGRAM(S): at 12:02

## 2018-02-14 RX ADMIN — MEXILETINE HYDROCHLORIDE 150 MILLIGRAM(S): 150 CAPSULE ORAL at 17:27

## 2018-02-14 RX ADMIN — Medication 0.5 MILLIGRAM(S): at 23:10

## 2018-02-14 RX ADMIN — Medication 100 GRAM(S): at 04:40

## 2018-02-14 RX ADMIN — Medication 1 APPLICATION(S): at 17:27

## 2018-02-14 RX ADMIN — Medication 5 MILLIGRAM(S): at 23:05

## 2018-02-14 RX ADMIN — Medication 50 MILLIEQUIVALENT(S): at 06:00

## 2018-02-14 RX ADMIN — MEXILETINE HYDROCHLORIDE 150 MILLIGRAM(S): 150 CAPSULE ORAL at 05:29

## 2018-02-14 RX ADMIN — AMIODARONE HYDROCHLORIDE 600 MILLIGRAM(S): 400 TABLET ORAL at 22:30

## 2018-02-14 RX ADMIN — POLYETHYLENE GLYCOL 3350 17 GRAM(S): 17 POWDER, FOR SOLUTION ORAL at 12:03

## 2018-02-14 RX ADMIN — Medication 200 GRAM(S): at 18:30

## 2018-02-14 RX ADMIN — Medication 500 MILLIGRAM(S): at 05:29

## 2018-02-14 RX ADMIN — IRON SUCROSE 210 MILLIGRAM(S): 20 INJECTION, SOLUTION INTRAVENOUS at 12:07

## 2018-02-14 RX ADMIN — Medication 50 GRAM(S): at 16:30

## 2018-02-14 RX ADMIN — Medication 50 MILLIEQUIVALENT(S): at 07:45

## 2018-02-14 RX ADMIN — Medication 100 MILLIGRAM(S): at 05:29

## 2018-02-14 RX ADMIN — Medication 25 MILLIGRAM(S): at 21:04

## 2018-02-14 RX ADMIN — Medication 1 APPLICATION(S): at 05:30

## 2018-02-14 NOTE — PROGRESS NOTE ADULT - PROBLEM SELECTOR PLAN 8
Will continue listing to status 1A as leukocytosis has improved and he has no clear evidence of infection.   Will need to repeat PRA later this week. Current PRA is 0%.

## 2018-02-14 NOTE — PROGRESS NOTE ADULT - ASSESSMENT
Mr. 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 He was admitted for probable pump thrombosis characterized by elevated LDH in the setting of being off anticoagulation for several months. 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 He was treated with APC of an AVM in the duodenum over the weekend and required blood transfusions. He had acute leukocytosis and was treated with antibiotics, but had no clear source of infection. Currently he is stable, but anemic.     He is transplantable and is currently listed status 1A.    Please call me with questions at [phone]. Plan discussed with CTU team and Dr. Praker.

## 2018-02-14 NOTE — PROGRESS NOTE ADULT - ASSESSMENT
Impression:  67 male with PMH Chronic Systolic Heart Failure (ACC/AHA Stage D) due to NICMP s/p LVAD 6/12/17, GIB s/p Cautery (7/25/2017), known AV Malformations with multiple enteroscopies last year, A-Fib, VT s/p AICD. Pt admitted due to elevated LDH level of 646 for further evaluation and observation.    Problem List:  1) Anemia 2/2 bleeding small bowel angioectasias - s/p enteroscopy 2/11 with APC for 4 AVM   2) NICM s/p LVAD  3) Afib  4) VT s/p AICD      Recommend:  - please trend CBC, transfuse as needed to goal Hb as per cardiology  - advance diet as tolerated   - if patient develops recurrent overt GI bleeding, recommend IR consult for embolization  - supportive care as per primary team

## 2018-02-14 NOTE — PROGRESS NOTE ADULT - SUBJECTIVE AND OBJECTIVE BOX
Subjective: No current complaints. He feels well. No dizziness, fevers, melena, or abdominal pain.     Medications:  acetaminophen   Tablet 650 milliGRAM(s) Oral every 6 hours PRN  ascorbic acid 500 milliGRAM(s) Oral two times a day  docusate sodium 100 milliGRAM(s) Oral three times a day  ferrous    sulfate 325 milliGRAM(s) Oral daily  hepatitis B Vaccine - Adult (ENGERIX) 1 milliLiter(s) IntraMuscular once  hydrocortisone 2.5% Cream 1 Application(s) Topical two times a day  iron sucrose IVPB 100 milliGRAM(s) IV Intermittent <User Schedule>  mexiletine 150 milliGRAM(s) Oral two times a day  pantoprazole  Injectable 40 milliGRAM(s) IV Push daily  polyethylene glycol 3350 17 Gram(s) Oral daily  QUEtiapine 50 milliGRAM(s) Oral at bedtime  senna 2 Tablet(s) Oral at bedtime    Physical Exam:    Vitals:    Vital Signs Last 24 Hrs  T(C): 37.2 (2018 12:15), Max: 37.2 (2018 12:00)  T(F): 98.9 (2018 12:15), Max: 98.9 (2018 12:00)  HR: 103 (2018 13:00) (86 - 103)  BP(mean): 90 (2018 13:00) (85 - 90)  RR: 16 (2018 13:00) (13 - 28)  SpO2: 100% (2018 13:00) (84% - 100%)    Daily Weight in k.8 (2018 02:00)    I&O's Summary    2018 07:01  -  2018 07:00  --------------------------------------------------------  IN: 900 mL / OUT: 1800 mL / NET: -900 mL    2018 07:01  -  2018 13:11  --------------------------------------------------------  IN: 240 mL / OUT: 300 mL / NET: -60 mL    General: No distress. Comfortable.  HEENT: EOM intact.  Neck: Neck supple. JVP not elevated. No masses  Chest: Clear to auscultation bilaterally  CV: Typical LVAD sounds. No distal pulses  Abdomen: Soft, non-distended, non-tender  Driveline exit site: Clean, dry, and intact  Skin: No rashes or skin breakdown  Neurology: Alert and oriented times three. Sensation intact  Psych: Affect normal    LVAD Interrogation: Heart Mate II  RPMs: 9200  Power: 5.7  Flow: 5.5  PI: 6.2  Event: None  No programming changes were made    Labs:                        6.2    10.9  )-----------( 145      ( 2018 01:41 )             18.6     -    141  |  106  |  25<H>  ----------------------------<  116<H>  3.8   |  23  |  1.16    Ca    7.8<L>      2018 01:41  Phos  2.5       Mg     1.9         TPro  5.3<L>  /  Alb  3.0<L>  /  TBili  0.3  /  DBili  x   /  AST  40  /  ALT  25  /  AlkPhos  63  -    PT/INR - ( 2018 01:59 )   PT: 14.9 sec;   INR: 1.36 ratio     PTT - ( 2018 01:59 )  PTT:26.7 sec    Serum Pro-Brain Natriuretic Peptide: 163 pg/mL ( @ 12:22)    Lactate Dehydrogenase, Serum: 366 U/L ( @ 01:41)  Lactate Dehydrogenase, Serum: 352 U/L ( @ 01:59)  Lactate Dehydrogenase, Serum: 399 U/L ( @ 00:50)

## 2018-02-14 NOTE — PROGRESS NOTE ADULT - PROBLEM SELECTOR PLAN 4
Will attempt to minimize transfusions to avoid sensitization.   Currently no evidence of active bleeding.   Unable to tolerate anticoagulants.  Will give IV iron replacement given low iron levels and concurrent anemia.

## 2018-02-14 NOTE — PROGRESS NOTE ADULT - SUBJECTIVE AND OBJECTIVE BOX
INFECTIOUS DISEASES FOLLOW UP-- Sujey Lujan  [phone]    This is a follow up note for this  67yMale with  Heart replaced by heart assist device. Id is following for leukocytosis and pre-heart tx. evaluation      ROS:  CONSTITUTIONAL:  No fever, good appetite  CARDIOVASCULAR:  No chest pain or palpitations  RESPIRATORY:  No change in dyspnea  GASTROINTESTINAL:  No nausea, vomiting, diarrhea, or abdominal pain  GENITOURINARY:  No dysuria  NEUROLOGIC:  No headache,     Allergies    No Known Allergies    Intolerances        ANTIBIOTICS/RELEVANT:  antimicrobials    immunologic:  hepatitis B Vaccine - Adult (ENGERIX) 1 milliLiter(s) IntraMuscular once    OTHER:  acetaminophen   Tablet 650 milliGRAM(s) Oral every 6 hours PRN  ascorbic acid 500 milliGRAM(s) Oral two times a day  docusate sodium 100 milliGRAM(s) Oral three times a day  ferrous    sulfate 325 milliGRAM(s) Oral daily  hydrocortisone 2.5% Cream 1 Application(s) Topical two times a day  iron sucrose IVPB 100 milliGRAM(s) IV Intermittent <User Schedule>  mexiletine 150 milliGRAM(s) Oral two times a day  pantoprazole  Injectable 40 milliGRAM(s) IV Push daily  polyethylene glycol 3350 17 Gram(s) Oral daily  QUEtiapine 50 milliGRAM(s) Oral at bedtime  senna 2 Tablet(s) Oral at bedtime      Objective:  Vital Signs Last 24 Hrs  T(C): 37.2 (14 Feb 2018 12:15), Max: 37.2 (14 Feb 2018 12:00)  T(F): 98.9 (14 Feb 2018 12:15), Max: 98.9 (14 Feb 2018 12:00)  HR: 106 (14 Feb 2018 16:00) (86 - 106)  BP: --  BP(mean): 100 (14 Feb 2018 15:00) (85 - 100)  RR: 21 (14 Feb 2018 16:00) (13 - 25)  SpO2: 100% (14 Feb 2018 16:00) (84% - 100%)    PHYSICAL EXAM:  Constitutional:no acute distress  Eyes:WALT, EOMI  Ear/Nose/Throat: no oral lesions, 	  Respiratory: clear BL, coarse decreased at bases  Cardiovascular: LVAD sounds, dressing dry and intact  Gastrointestinal:soft, (+) BS, no tenderness  Extremities:no e/e/c  No Lymphadenopathy  IV sites not inflammed.    LABS:                        7.0    14.8  )-----------( 187      ( 14 Feb 2018 13:56 )             21.4     02-14    139  |  104  |  21  ----------------------------<  99  4.2   |  23  |  0.98    Ca    8.3<L>      14 Feb 2018 13:56  Phos  2.5     02-14  Mg     1.9     02-14    TPro  5.3<L>  /  Alb  3.0<L>  /  TBili  0.3  /  DBili  x   /  AST  40  /  ALT  25  /  AlkPhos  63  02-14    PT/INR - ( 13 Feb 2018 01:59 )   PT: 14.9 sec;   INR: 1.36 ratio         PTT - ( 13 Feb 2018 01:59 )  PTT:26.7 sec      MICROBIOLOGY:            RECENT CULTURES:  02-09 @ 16:40  .Blood Blood-Peripheral  --  --  --    No growth to date.  --  02-09 @ 01:05  .Blood Blood-Venous  --  --  --    No growth at 5 days.  --      RADIOLOGY & ADDITIONAL STUDIES:    < from: Xray Chest 1 View- PORTABLE-Routine (02.14.18 @ 03:57) >    Impression: Improving expansion of the lungs.    < end of copied text >

## 2018-02-14 NOTE — PROGRESS NOTE ADULT - SUBJECTIVE AND OBJECTIVE BOX
Chief Complaint:  Patient is a 67y old  Male who presents with a chief complaint of Elevated LDH levels (2018 19:36)      Interval Events:  Hgb downtrended to 6.2 without overt GI bleeding. Patient had a BM yesterday but did not see it, team said it was not melenic.     Allergies:  No Known Allergies      Hospital Medications:  acetaminophen   Tablet 650 milliGRAM(s) Oral every 6 hours PRN  ascorbic acid 500 milliGRAM(s) Oral two times a day  docusate sodium 100 milliGRAM(s) Oral three times a day  ferrous    sulfate 325 milliGRAM(s) Oral daily  hepatitis B Vaccine - Adult (ENGERIX) 1 milliLiter(s) IntraMuscular once  hydrocortisone 2.5% Cream 1 Application(s) Topical two times a day  mexiletine 150 milliGRAM(s) Oral two times a day  pantoprazole  Injectable 40 milliGRAM(s) IV Push daily  polyethylene glycol 3350 17 Gram(s) Oral daily  potassium chloride  10 mEq/50 mL IVPB 10 milliEquivalent(s) IV Intermittent every 1 hour  QUEtiapine 50 milliGRAM(s) Oral at bedtime  senna 2 Tablet(s) Oral at bedtime      PMHX/PSHX:  GIB (gastrointestinal bleeding)  H/O prior ablation treatment  Ventricular fibrillation  PAF (paroxysmal atrial fibrillation)  Non-Ischemic Cardiomyopathy  SVT (Supraventricular Tachycardia)  HTN  CHF (Congestive Heart Failure)  LVAD (left ventricular assist device) present  Status post left hip replacement  Hypertension  Hypertension  History of Prior Ablation Treatment  AICD (Automatic Cardioverter/Defibrillator) Present      Family history:  No pertinent family history in first degree relatives      ROS:     General:  No wt loss, fevers, chills, night sweats, fatigue,   Eyes:  Good vision, no reported pain  ENT:  No sore throat, pain, runny nose, dysphagia  CV:  No pain, palpitations, hypo/hypertension  Resp:  No dyspnea, cough, tachypnea, wheezing  GI:  See HPI  :  No pain, bleeding, incontinence, nocturia  Muscle:  No pain, weakness  Neuro:  No weakness, tingling, memory problems  Psych:  No fatigue, insomnia, mood problems, depression  Endocrine:  No polyuria, polydipsia, cold/heat intolerance  Heme:  No petechiae, ecchymosis, easy bruisability  Skin:  No rash, edema      PHYSICAL EXAM:     GENERAL:  Appears stated age, well-groomed, well-nourished, no distress  HEENT:  NC/AT,  conjunctivae clear, sclera -anicteric  CHEST:  Full & symmetric excursion, no increased effort  ABDOMEN:  Soft, non-tender, non-distended  EXTREMITIES:  no cyanosis,clubbing or edema  SKIN:  No rash/erythema/ecchymoses/petechiae/wounds/abscess/warm/dry  NEURO:  Alert, oriented    Vital Signs:  Vital Signs Last 24 Hrs  T(C): 36.6 (2018 07:00), Max: 37.1 (2018 23:00)  T(F): 97.9 (2018 07:00), Max: 98.8 (2018 23:00)  HR: 92 (2018 08:00) (69 - 127)  BP: --  BP(mean): --  RR: 31 (2018 08:00) (13 - 33)  SpO2: 100% (:00) (84% - 100%)  Daily     Daily Weight in k.8 (2018 02:00)    LABS:                        6.2    10.9  )-----------( 145      ( 2018 01:41 )             18.6     02-    141  |  106  |  25<H>  ----------------------------<  116<H>  3.8   |  23  |  1.16    Ca    7.8<L>      2018 01:41  Phos  2.5     -  Mg     1.9     -14    TPro  5.3<L>  /  Alb  3.0<L>  /  TBili  0.3  /  DBili  x   /  AST  40  /  ALT  25  /  AlkPhos  63  02-14    LIVER FUNCTIONS - ( 2018 01:41 )  Alb: 3.0 g/dL / Pro: 5.3 g/dL / ALK PHOS: 63 U/L / ALT: 25 U/L RC / AST: 40 U/L / GGT: x           PT/INR - ( 2018 01:59 )   PT: 14.9 sec;   INR: 1.36 ratio         PTT - ( 2018 01:59 )  PTT:26.7 sec        Imaging:    no new imaging

## 2018-02-14 NOTE — PROGRESS NOTE ADULT - ASSESSMENT
Imp:    Leukocytosis with negative cultures- suspect GI bleeding as cause    Will monitor of antimicrobials at this point    Will give another dose of HBV vaccine 20ucg for a total of 40ucg initial dose- received    Long discussion with MR. Baez about the option of accepting a heart from an HCV+ donor and the implications for HCV therapy needed- he is considering the options      Gary Lujan MD  [phone]  After 5pm/weekends [phone]

## 2018-02-14 NOTE — PROGRESS NOTE ADULT - SUBJECTIVE AND OBJECTIVE BOX
BILLY RUSSELL   MRN#: [id number]     The patient is a 67y Male who was seen, evaluated, & examined with the CTICU staff on rounds and later in the day with a multidisciplinary care plan formulated & implemented.  All available clinical, laboratory, radiographic, pharmacologic, and electrocardiographic data were reviewed & analyzed.      The patient was in the CTICU in critical condition secondary to acute upper GI bleed-hemorrhagic shock, persistent cardiopulmonary dysfunction, and cardiogenic shock-cardiovascular dysfunction-S/P LAVD.      Respiratory status required supplemental oxygen, the following of ABG’s with A-line monitoring, continuous pulse oximetry monitoring, and an IV Propofol infusion for support & to evaluate for & prevent further decompensation secondary to resolved acute upper GI bleed-hemorrhagic shock, persistent cardiopulmonary dysfunction and cardiogenic shock-cardiovascular dysfunction-S/P LVAD.     Invasive hemodynamic monitoring with an A-line was required for the following of continuous MAP/BP monitoring to ensure adequate cardiovascular support and to evaluate for & help prevent decompensation while receiving an IV Iron sulfate  secondary to resolved acute upper GI bleed-hemorrhagic shock, cardiogenic shock-cardiovascular dysfunction-S/P LVAD, and acute blood loss anemia.  Invasive hemodynamic monitoring with the A-line was subsequently removed.    Patient required critical care management and I provided 80 minutes of non-continuous care to the patient.  Discussed at length with the CTICU staff and helped coordinate care.

## 2018-02-15 LAB
AMYLASE P1 CFR SERPL: 89 U/L — SIGNIFICANT CHANGE UP (ref 25–125)
LIDOCAIN IGE QN: 33 U/L — SIGNIFICANT CHANGE UP (ref 7–60)
PROCALCITONIN SERPL-MCNC: 2.95 NG/ML — HIGH (ref 0–0.04)

## 2018-02-15 PROCEDURE — 93750 INTERROGATION VAD IN PERSON: CPT

## 2018-02-15 PROCEDURE — 99232 SBSQ HOSP IP/OBS MODERATE 35: CPT

## 2018-02-15 PROCEDURE — 99233 SBSQ HOSP IP/OBS HIGH 50: CPT | Mod: 25

## 2018-02-15 PROCEDURE — 71045 X-RAY EXAM CHEST 1 VIEW: CPT | Mod: 26

## 2018-02-15 RX ORDER — SODIUM CHLORIDE 9 MG/ML
3 INJECTION INTRAMUSCULAR; INTRAVENOUS; SUBCUTANEOUS EVERY 8 HOURS
Qty: 0 | Refills: 0 | Status: DISCONTINUED | OUTPATIENT
Start: 2018-02-15 | End: 2018-02-23

## 2018-02-15 RX ADMIN — Medication 325 MILLIGRAM(S): at 12:29

## 2018-02-15 RX ADMIN — Medication 25 MILLIGRAM(S): at 06:24

## 2018-02-15 RX ADMIN — MEXILETINE HYDROCHLORIDE 150 MILLIGRAM(S): 150 CAPSULE ORAL at 06:24

## 2018-02-15 RX ADMIN — Medication 25 MILLIGRAM(S): at 13:44

## 2018-02-15 RX ADMIN — SODIUM CHLORIDE 3 MILLILITER(S): 9 INJECTION INTRAMUSCULAR; INTRAVENOUS; SUBCUTANEOUS at 21:38

## 2018-02-15 RX ADMIN — Medication 500 MILLIGRAM(S): at 17:47

## 2018-02-15 RX ADMIN — Medication 650 MILLIGRAM(S): at 12:30

## 2018-02-15 RX ADMIN — PANTOPRAZOLE SODIUM 40 MILLIGRAM(S): 20 TABLET, DELAYED RELEASE ORAL at 12:29

## 2018-02-15 RX ADMIN — IRON SUCROSE 210 MILLIGRAM(S): 20 INJECTION, SOLUTION INTRAVENOUS at 12:29

## 2018-02-15 RX ADMIN — Medication 1 APPLICATION(S): at 17:47

## 2018-02-15 RX ADMIN — Medication 1 APPLICATION(S): at 06:25

## 2018-02-15 RX ADMIN — Medication 25 MILLIGRAM(S): at 21:39

## 2018-02-15 RX ADMIN — SODIUM CHLORIDE 3 MILLILITER(S): 9 INJECTION INTRAMUSCULAR; INTRAVENOUS; SUBCUTANEOUS at 11:55

## 2018-02-15 RX ADMIN — QUETIAPINE FUMARATE 50 MILLIGRAM(S): 200 TABLET, FILM COATED ORAL at 21:39

## 2018-02-15 RX ADMIN — Medication 500 MILLIGRAM(S): at 06:24

## 2018-02-15 RX ADMIN — MEXILETINE HYDROCHLORIDE 150 MILLIGRAM(S): 150 CAPSULE ORAL at 17:47

## 2018-02-15 NOTE — PROGRESS NOTE ADULT - PROBLEM SELECTOR PLAN 3
Continue Coreg 25 BID   Hydralazine  TID   Mexiletine 150mg BID   Transplant Listed for OHT, blood type B, UNOS 1A status. No crossmatch needed (cPRA 0%).    Check daily BMP. supplement k prn.  MAP goal 70 -80 Continue Coreg 25 BID   Hydralazine  TID   Mexiletine 150mg BID   Transplant Listed for OHT, blood type B, UNOS 1A status. No crossmatch needed (cPRA 0%).    Check daily BMP. LDH supplement k prn.  MAP goal 70 -80  A/C on hold

## 2018-02-15 NOTE — PROGRESS NOTE ADULT - PROBLEM SELECTOR PLAN 8
Will continue listing to status 1A for now.   Will need to repeat PRA later this week. Current PRA is 0%.

## 2018-02-15 NOTE — PROGRESS NOTE ADULT - ASSESSMENT
Mr. 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 He was admitted for probable pump thrombosis characterized by elevated LDH in the setting of being off anticoagulation for several months. 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 He was treated with APC of an AVM in the duodenum over the weekend and required blood transfusions. His intial leukocytosis resolved without specific therapy.    Currently he is stable, with improving anemia following IV iron infusion. However, he has acute leukocytosis with borderline elevated temperature of unclear etiology.     He is transplantable and is currently listed status 1A, although we will reevaluate over the course of the day given the leukocytosis.     Please call me with questions at [phone]. Plan discussed with CTU team and Dr. Parker.

## 2018-02-15 NOTE — PROGRESS NOTE ADULT - SUBJECTIVE AND OBJECTIVE BOX
Subjective Hello resting in bed no acute distress    VITAL SIGNS  Telemetry:  Sinus Tack 130    Vital Signs Last 24 Hrs  T(C): 36.8 (02-15-18 @ 13:30), Max: 37.8 (02-15-18 @ 03:00)  T(F): 98.3 (02-15-18 @ 13:30), Max: 100 (02-15-18 @ 03:00)  HR: 117 (02-15-18 @ 09:30) (100 - 117)  BP: --  RR: 18 (02-15-18 @ 13:30) (16 - 27)  SpO2: 99% (02-15-18 @ 13:30) (98% - 100%)          @ 07:01  -  02-15 @ 07:00  --------------------------------------------------------  IN: 930 mL / OUT: 2790 mL / NET: -1860 mL    02-15 @ 07:01  -  02-15 @ 15:08  --------------------------------------------------------  IN: 470 mL / OUT: 0 mL / NET: 470 mL  LVAD  speed 9200  flow 5.5  power 5.8  PI 6.4   up from 366    Daily Weight in k.9 (15 Feb 2018 00:00)  Coumadin    [ ] YES          [  x]      NO                      PHYSICAL EXAM  Neurology: alert and oriented x 3, nonfocal, no gross deficits  CV : LVAD sounds  drive line site CDI  Lungs:CTA  Abdomen: soft, nontender, nondistended, positive bowel sounds, last bowel movement 2/15  :  Voids           Extremities:   B/L LE warm well perfused        Physical Therapy Rec:   Home  [  ]   Home w/ PT  [ x ]  Rehab  [  ]    Discussed with Cardiothoracic Team at AM rounds.

## 2018-02-15 NOTE — PROGRESS NOTE ADULT - SUBJECTIVE AND OBJECTIVE BOX
INFECTIOUS DISEASES FOLLOW UP-- Sujey Lujan  [phone]    This is a follow up note for this  67yMale with  Heart replaced by heart assist device with leukocytosis and concern for underlying infection but appears clinically well from an ID perspective      ROS:  CONSTITUTIONAL:  No fever, good appetite  CARDIOVASCULAR:  No chest pain or palpitations  RESPIRATORY:  No dyspnea  GASTROINTESTINAL:  No nausea, vomiting, 2 bouts loose stool this Am diarrhea, or abdominal pain  GENITOURINARY:  No dysuria  NEUROLOGIC:  No headache,     Allergies    No Known Allergies    Intolerances        ANTIBIOTICS/RELEVANT:  antimicrobials    immunologic:  hepatitis B Vaccine - Adult (ENGERIX) 1 milliLiter(s) IntraMuscular once    OTHER:  acetaminophen   Tablet 650 milliGRAM(s) Oral every 6 hours PRN  ascorbic acid 500 milliGRAM(s) Oral two times a day  docusate sodium 100 milliGRAM(s) Oral three times a day  ferrous    sulfate 325 milliGRAM(s) Oral daily  hydrALAZINE 25 milliGRAM(s) Oral every 8 hours  hydrocortisone 2.5% Cream 1 Application(s) Topical two times a day  iron sucrose IVPB 100 milliGRAM(s) IV Intermittent <User Schedule>  mexiletine 150 milliGRAM(s) Oral two times a day  pantoprazole  Injectable 40 milliGRAM(s) IV Push daily  polyethylene glycol 3350 17 Gram(s) Oral daily  QUEtiapine 50 milliGRAM(s) Oral at bedtime  senna 2 Tablet(s) Oral at bedtime  sodium chloride 0.9% lock flush 3 milliLiter(s) IV Push every 8 hours      Objective:  Vital Signs Last 24 Hrs  T(C): 36.8 (15 Feb 2018 13:30), Max: 37.8 (15 Feb 2018 03:00)  T(F): 98.3 (15 Feb 2018 13:30), Max: 100 (15 Feb 2018 03:00)  HR: 117 (15 Feb 2018 09:30) (100 - 117)  BP: --  BP(mean): 90 (15 Feb 2018 13:30) (76 - 110)  RR: 18 (15 Feb 2018 13:30) (16 - 27)  SpO2: 99% (15 Feb 2018 13:30) (98% - 100%)    PHYSICAL EXAM:  Constitutional:no acute distress reports feeling well  Eyes:WALT, EOMI  Ear/Nose/Throat: no oral lesions, 	  Respiratory: clear BL on exam  Cardiovascular: LVAD sounds, dressing dry and intact  Gastrointestinal:soft, (+) BS, no tenderness, had 2 loose BMs this morning after taking stool softeners  Extremities:no e/e/c, old IV and CVC sites without erythema  No Lymphadenopathy  IV sites not inflammed.    LABS:                        7.9    21.1  )-----------( 215      ( 14 Feb 2018 22:32 )             23.6     02-14    134<L>  |  101  |  16  ----------------------------<  115<H>  4.3   |  22  |  0.98    Ca    8.6      14 Feb 2018 22:32  Phos  2.4     02-14  Mg     2.2     02-14    TPro  6.3  /  Alb  3.4  /  TBili  0.5  /  DBili  x   /  AST  33  /  ALT  27  /  AlkPhos  67  02-14          MICROBIOLOGY:            RECENT CULTURES:  02-09 @ 16:40  .Blood Blood-Peripheral  --  --  --    No growth at 5 days.  --  02-09 @ 01:05  .Blood Blood-Venous  --  --  --    No growth at 5 days.  --      RADIOLOGY & ADDITIONAL STUDIES:    < from: Xray Chest 1 View- PORTABLE-Routine (02.15.18 @ 04:30) >  Impression:    The heart is normal in size. The lungs are clear. Status post sternotomy.   A pacer is in good position. A left ventricular assisted device is   present.    < end of copied text >

## 2018-02-15 NOTE — PROGRESS NOTE ADULT - ASSESSMENT
67M PMH Chronic Systolic Heart Failure (ACC/AHA Stage D) due to NICMP s/p LVAD 6/12/17, GIB s/p Cautery (7/25/2017), known AV Malformations, Chronic Anemia due to numerous GIBs (off AC), A-Fib, VT s/p AICD. Pt admitted due to elevated LDH level of 646 (previously 466) for further evaluation and observation.   2/2 VSS ; INR 1.3- continue heparin and coumadin   meds as per CHF team  2/3 - monitor LDH twice a day; continue heparin and coumadin - INR 1.5- coumadin 3 mg today; continue meds as per CHF team   renal sono to evaluate left renal cyst   2/4 RAMP echo, no speed adjustments made  2/5 . Transplant evaluation in progress  2/6  Integrilin initiated, hydralazine 10mg tid  2/7 , INR 2.17, continue Heparin drip per HF. Increase integrilin drip to 1mcg, hydralazine to 25q8 and lasix daily.  2/8 VXR020 INR 2.18  c/w heparin gtt intergrilin gTT 1 mcg  D/C lasix and  aldactone and Hold  coumadin tonight  repeat CBC stat  for 9.0/27.5 down from 11.2/34.7  2/9 Near syncope transferred to SDU PRBCx2 given for HCT 21.4 calcium gluconate D50 and Bicarb transferred to 2CTU  2/10 67M PMH Chronic Systolic Heart Failure (ACC/AHA Stage D) due to NICMP s/p LVAD 6/12/17, GIB s/p Cautery (7/25/2017), known AV Malformations, Chronic Anemia due to numerous GIBs (off AC), A-Fib, VT s/p AICD. Pt admitted due to elevated LDH level of 646 (previously 466) for further evaluation and observation.   2/2 VSS ; INR 1.3- continue heparin and coumadin   meds as per CHF team  2/3 - monitor LDH twice a day; continue heparin and coumadin - INR 1.5- coumadin 3 mg today; continue meds as per CHF team   renal sono to evaluate left renal cyst   2/4 RAMP echo, no speed adjustments made  2/5 . Transplant evaluation in progress  2/6  Integrilin initiated, hydralazine 10mg tid  2/7 , INR 2.17, continue Heparin drip per HF. Increase integrilin drip to 1mcg, hydralazine to 25q8 and lasix daily.  2/8 VFI421 INR 2.18  c/w heparin gtt intergrilin gTT 1 mcg  D/C lasix and  aldactone and Hold  coumadin tonight  repeat CBC stat  for 9.0/27.5 down from 11.2/34.7  2/9 Near syncope transferred to SDU PRBCx2 given for HCT 21.4 calcium gluconate D50 and Bicarb transferred to 2CTU in  ARF  2/10 leukocytosis improving on cefepime no source of infection  PRBC x2 coumadin on hold   2/11 intubated  bleeding  scan showing active duodenal bleeding. s/p enteroscopy  with APC for 4 AVM Now PRBC up to PRBC x6  2/12 low dose vasopressin  2/13 stable but anemic  2/14 stable no  evidence of infection  2/15 transferred to [address] [place] [address] today  7.9/23.6

## 2018-02-15 NOTE — PROGRESS NOTE ADULT - SUBJECTIVE AND OBJECTIVE BOX
Subjective: No current complaints. He has mild abdominal distention. No fevers, chills, or sweats. He is not dizzy. He was able to walk yesterday with assistance.     Medications:  acetaminophen   Tablet 650 milliGRAM(s) Oral every 6 hours PRN  ascorbic acid 500 milliGRAM(s) Oral two times a day  docusate sodium 100 milliGRAM(s) Oral three times a day  ferrous    sulfate 325 milliGRAM(s) Oral daily  hepatitis B Vaccine - Adult (ENGERIX) 1 milliLiter(s) IntraMuscular once  hydrALAZINE 25 milliGRAM(s) Oral every 8 hours  hydrocortisone 2.5% Cream 1 Application(s) Topical two times a day  iron sucrose IVPB 100 milliGRAM(s) IV Intermittent <User Schedule>  mexiletine 150 milliGRAM(s) Oral two times a day  pantoprazole  Injectable 40 milliGRAM(s) IV Push daily  polyethylene glycol 3350 17 Gram(s) Oral daily  QUEtiapine 50 milliGRAM(s) Oral at bedtime  senna 2 Tablet(s) Oral at bedtime  sodium chloride 0.9% lock flush 3 milliLiter(s) IV Push every 8 hours    Physical Exam:    Vitals:  T(C): 36.7 (02-15-18 @ 09:30), Max: 37.8 (02-15-18 @ 03:00)  HR: 117 (02-15-18 @ 09:30) (100 - 117)  BP(mean): 76 (02-15-18 @ 09:30) (76 - 110)  RR: 16 (02-15-18 @ 09:30) (13 - 27)  SpO2: 100% (02-15-18 @ 09:30) (98% - 100%)    Daily Weight in k.9 (15 Feb 2018 00:00)    I&O's Summary    2018 07:01  -  15 Feb 2018 07:00  --------------------------------------------------------  IN: 930 mL / OUT: 2790 mL / NET: -1860 mL    15 Feb 2018 07:01  -  15 Feb 2018 13:13  --------------------------------------------------------  IN: 350 mL / OUT: 0 mL / NET: 350 mL    General: No distress. Comfortable.  HEENT: EOM intact.  Neck: Neck supple. JVP not elevated. No masses  Chest: Clear to auscultation bilaterally  CV: Typical LVAD sounds. No distal pulses  Abdomen: Soft, non-distended, non-tender  Driveline exit site: Clean, dry, and intact  Skin: No rashes or skin breakdown  Neurology: Alert and oriented times three. Sensation intact  Psych: Affect normal    LVAD Interrogation: Heart Mate II  RPMs: 9200  Power: 5.6  Flow: 5.3  PI: 6.3  Event: No events.   No programming changes were made.    Labs:                        7.9    21.1  )-----------( 215      ( 2018 22:32 )             23.6         134<L>  |  101  |  16  ----------------------------<  115<H>  4.3   |  22  |  0.98    Ca    8.6      2018 22:32  Phos  2.4       Mg     2.2         TPro  6.3  /  Alb  3.4  /  TBili  0.5  /  DBili  x   /  AST  33  /  ALT  27  /  AlkPhos  67      Serum Pro-Brain Natriuretic Peptide: 163 pg/mL ( @ 12:22)    Lactate Dehydrogenase, Serum: 408 U/L ( @ 22:32)  Lactate Dehydrogenase, Serum: 366 U/L ( @ 01:41)  Lactate Dehydrogenase, Serum: 352 U/L ( @ 01:59)

## 2018-02-15 NOTE — PROGRESS NOTE ADULT - ASSESSMENT
Imp:    Leukocytosis, low grade temp 37.8, elevated pro-calcitonin but clinically looks very well and exam is stable without new findings or source of infection    If loose stool recurs please send C.diff toxin  IF he clinically deteriorates would begin Vancomycin [id number] and Cefepime 2gram q 12hr  would also check RVP- although he has no URI sxs. at this point      Gary Luajn MD  [phone]  After 5pm/weekends [phone]

## 2018-02-15 NOTE — PROGRESS NOTE ADULT - PROBLEM SELECTOR PLAN 1
Continue Amiodarone 200 daily/ coreg and mexiletine  Continue anticoagulation with heparin gttp and Integrilin gtt Continue coreg and mexiletine

## 2018-02-15 NOTE — PROGRESS NOTE ADULT - PROBLEM SELECTOR PLAN 4
Will attempt to minimize transfusions to avoid sensitization.   Currently no evidence of active bleeding.   Unable to tolerate anticoagulants.  Will continue IV iron replacement given low iron levels and concurrent anemia.

## 2018-02-15 NOTE — PROGRESS NOTE ADULT - PROBLEM SELECTOR PLAN 2
Check daily CBC and BID LDH levels  Continue Integrilin gtt at 1.0mcg/kg/min   down from 963 Check daily CBC and BID LDH levels   up from 366

## 2018-02-16 LAB
ANION GAP SERPL CALC-SCNC: 11 MMOL/L — SIGNIFICANT CHANGE UP (ref 5–17)
APTT BLD: 30.5 SEC — SIGNIFICANT CHANGE UP (ref 27.5–37.4)
BUN SERPL-MCNC: 19 MG/DL — SIGNIFICANT CHANGE UP (ref 7–23)
CALCIUM SERPL-MCNC: 8.3 MG/DL — LOW (ref 8.4–10.5)
CHLORIDE SERPL-SCNC: 105 MMOL/L — SIGNIFICANT CHANGE UP (ref 96–108)
CO2 SERPL-SCNC: 24 MMOL/L — SIGNIFICANT CHANGE UP (ref 22–31)
CREAT SERPL-MCNC: 1.22 MG/DL — SIGNIFICANT CHANGE UP (ref 0.5–1.3)
GLUCOSE SERPL-MCNC: 90 MG/DL — SIGNIFICANT CHANGE UP (ref 70–99)
HCT VFR BLD CALC: 22 % — LOW (ref 39–50)
HGB BLD-MCNC: 7.1 G/DL — LOW (ref 13–17)
INR BLD: 1.14 RATIO — SIGNIFICANT CHANGE UP (ref 0.88–1.16)
LDH SERPL L TO P-CCNC: 392 U/L — HIGH (ref 50–242)
MAGNESIUM SERPL-MCNC: 2.2 MG/DL — SIGNIFICANT CHANGE UP (ref 1.6–2.6)
MCHC RBC-ENTMCNC: 31.7 PG — SIGNIFICANT CHANGE UP (ref 27–34)
MCHC RBC-ENTMCNC: 32.3 GM/DL — SIGNIFICANT CHANGE UP (ref 32–36)
MCV RBC AUTO: 98.1 FL — SIGNIFICANT CHANGE UP (ref 80–100)
PLATELET # BLD AUTO: 230 K/UL — SIGNIFICANT CHANGE UP (ref 150–400)
POTASSIUM SERPL-MCNC: 3.8 MMOL/L — SIGNIFICANT CHANGE UP (ref 3.5–5.3)
POTASSIUM SERPL-SCNC: 3.8 MMOL/L — SIGNIFICANT CHANGE UP (ref 3.5–5.3)
PROTHROM AB SERPL-ACNC: 12.5 SEC — SIGNIFICANT CHANGE UP (ref 9.8–12.7)
RBC # BLD: 2.24 M/UL — LOW (ref 4.2–5.8)
RBC # FLD: 17 % — HIGH (ref 10.3–14.5)
SODIUM SERPL-SCNC: 140 MMOL/L — SIGNIFICANT CHANGE UP (ref 135–145)
WBC # BLD: 11.7 K/UL — HIGH (ref 3.8–10.5)
WBC # FLD AUTO: 11.7 K/UL — HIGH (ref 3.8–10.5)

## 2018-02-16 PROCEDURE — 93750 INTERROGATION VAD IN PERSON: CPT

## 2018-02-16 PROCEDURE — 71045 X-RAY EXAM CHEST 1 VIEW: CPT | Mod: 26

## 2018-02-16 PROCEDURE — 99233 SBSQ HOSP IP/OBS HIGH 50: CPT | Mod: 25

## 2018-02-16 PROCEDURE — 99232 SBSQ HOSP IP/OBS MODERATE 35: CPT

## 2018-02-16 RX ORDER — POTASSIUM CHLORIDE 20 MEQ
40 PACKET (EA) ORAL ONCE
Qty: 0 | Refills: 0 | Status: COMPLETED | OUTPATIENT
Start: 2018-02-16 | End: 2018-02-16

## 2018-02-16 RX ADMIN — Medication 1 APPLICATION(S): at 21:10

## 2018-02-16 RX ADMIN — Medication 500 MILLIGRAM(S): at 17:17

## 2018-02-16 RX ADMIN — QUETIAPINE FUMARATE 50 MILLIGRAM(S): 200 TABLET, FILM COATED ORAL at 21:10

## 2018-02-16 RX ADMIN — SODIUM CHLORIDE 3 MILLILITER(S): 9 INJECTION INTRAMUSCULAR; INTRAVENOUS; SUBCUTANEOUS at 14:38

## 2018-02-16 RX ADMIN — Medication 1 MILLILITER(S): at 19:00

## 2018-02-16 RX ADMIN — MEXILETINE HYDROCHLORIDE 150 MILLIGRAM(S): 150 CAPSULE ORAL at 05:58

## 2018-02-16 RX ADMIN — MEXILETINE HYDROCHLORIDE 150 MILLIGRAM(S): 150 CAPSULE ORAL at 18:06

## 2018-02-16 RX ADMIN — Medication 25 MILLIGRAM(S): at 21:10

## 2018-02-16 RX ADMIN — Medication 25 MILLIGRAM(S): at 05:58

## 2018-02-16 RX ADMIN — SODIUM CHLORIDE 3 MILLILITER(S): 9 INJECTION INTRAMUSCULAR; INTRAVENOUS; SUBCUTANEOUS at 05:57

## 2018-02-16 RX ADMIN — Medication 25 MILLIGRAM(S): at 14:35

## 2018-02-16 RX ADMIN — Medication 500 MILLIGRAM(S): at 05:58

## 2018-02-16 RX ADMIN — IRON SUCROSE 210 MILLIGRAM(S): 20 INJECTION, SOLUTION INTRAVENOUS at 08:21

## 2018-02-16 RX ADMIN — Medication 40 MILLIEQUIVALENT(S): at 09:55

## 2018-02-16 RX ADMIN — PANTOPRAZOLE SODIUM 40 MILLIGRAM(S): 20 TABLET, DELAYED RELEASE ORAL at 17:17

## 2018-02-16 RX ADMIN — SODIUM CHLORIDE 3 MILLILITER(S): 9 INJECTION INTRAMUSCULAR; INTRAVENOUS; SUBCUTANEOUS at 21:12

## 2018-02-16 NOTE — PROGRESS NOTE ADULT - PROBLEM SELECTOR PLAN 1
Continue with Hydralazine 25 mg PO TID   Continue with mexiletine 150 mg PO BID  Hold AC therapy 2/2 GIB

## 2018-02-16 NOTE — PROGRESS NOTE ADULT - PROBLEM SELECTOR PLAN 3
Continue Coreg 25mg PO BID   Hydralazine 25 mg PO TID   Mexiletine 150 mg PO BID   Transplant Listed for OHT, blood type B, UNOS 1A status. No crossmatch needed (cPRA 0%).    Check daily BMP. LDH supplement k prn.  MAP goal 70 -80  A/C on hold  Plan of care discussed with covering attending Hydralazine 25 mg PO TID   Mexiletine 150 mg PO BID   Transplant Listed for OHT, blood type B, UNOS 1A status. No crossmatch needed (cPRA 0%).    Check daily BMP. LDH supplement k prn.  MAP goal 70 -80  A/C on hold  Plan of care discussed with covering attending

## 2018-02-16 NOTE — PROGRESS NOTE ADULT - SUBJECTIVE AND OBJECTIVE BOX
Subjective: No current complaints. He feels well.     Medications:  acetaminophen   Tablet 650 milliGRAM(s) Oral every 6 hours PRN  ascorbic acid 500 milliGRAM(s) Oral two times a day  docusate sodium 100 milliGRAM(s) Oral three times a day  hepatitis B Vaccine - Adult (ENGERIX) 1 milliLiter(s) IntraMuscular once  hydrALAZINE 25 milliGRAM(s) Oral every 8 hours  hydrocortisone 2.5% Cream 1 Application(s) Topical two times a day  iron sucrose IVPB 100 milliGRAM(s) IV Intermittent <User Schedule>  mexiletine 150 milliGRAM(s) Oral two times a day  pantoprazole  Injectable 40 milliGRAM(s) IV Push daily  polyethylene glycol 3350 17 Gram(s) Oral daily  QUEtiapine 50 milliGRAM(s) Oral at bedtime  senna 2 Tablet(s) Oral at bedtime  sodium chloride 0.9% lock flush 3 milliLiter(s) IV Push every 8 hours    Physical Exam:    Vital Signs Last 24 Hrs  T(C): 36.3 (2018 09:30), Max: 36.9 (15 Feb 2018 17:30)  T(F): 97.3 (2018 09:30), Max: 98.4 (15 Feb 2018 17:30)  HR: 105 (2018 09:30) (100 - 105)  BP(mean): 80 (2018 09:30) (72 - 90)  RR: 18 (2018 09:30) (18 - 20)  SpO2: 99% (2018 09:30) (97% - 100%)    Daily Weight in k.8 (2018 08:37)    I&O's Summary    15 Feb 2018 07:  -  2018 07:00  --------------------------------------------------------  IN: 710 mL / OUT: 950 mL / NET: -240 mL    2018 07:  -  2018 12:43  --------------------------------------------------------  IN: 240 mL / OUT: 0 mL / NET: 240 mL    General: No distress. Comfortable.  HEENT: EOM intact.  Neck: Neck supple. JVP not elevated. No masses  Chest: Clear to auscultation bilaterally  CV: Typical LVAD sounds. No distal pulses. No edema.   Abdomen: Soft, non-distended, non-tender  Driveline exit site: Clean, dry, and intact  Skin: No rashes or skin breakdown  Neurology: Alert and oriented times three. Sensation intact  Psych: Affect normal    LVAD Interrogation: Heart Mate II  RPMs: 9200  Power: 6.1  Flow: 6.1  PI: 4.5  Event: None  No programming changes were made    Labs:                        7.1    11.7  )-----------( 230      ( 2018 05:58 )             22.0         140  |  105  |  19  ----------------------------<  90  3.8   |  24  |  1.22    Ca    8.3<L>      2018 05:58  Phos  2.4       Mg     2.2         TPro  6.3  /  Alb  3.4  /  TBili  0.5  /  DBili  x   /  AST  33  /  ALT  27  /  AlkPhos  67      PT/INR - ( 2018 05:58 )   PT: 12.5 sec;   INR: 1.14 ratio       PTT - ( 2018 05:58 )  PTT:30.5 sec    Lactate Dehydrogenase, Serum: 392 U/L ( @ 05:58)  Lactate Dehydrogenase, Serum: 408 U/L ( @ 22:32)  Lactate Dehydrogenase, Serum: 366 U/L ( @ 01:41)

## 2018-02-16 NOTE — PROGRESS NOTE ADULT - ASSESSMENT
Mr. 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 He was admitted for probable pump thrombosis characterized by elevated LDH in the setting of being off anticoagulation for several months. 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 He was treated with APC of an AVM in the duodenum over the weekend and required blood transfusions. His intial leukocytosis resolved without specific therapy.    Currently he is stable, with improving anemia following IV iron infusion. His leukocytosis has improved and he has no overt evidence of infection. He feels well.     He is transplantable and is currently listed status 1A.    Please call me with questions at [phone]. Plan discussed with CTU team and Dr. Parker.

## 2018-02-16 NOTE — CHART NOTE - NSCHARTNOTEFT_GEN_A_CORE
Patient's creatinine has been stable.  Resolved NORMAN and hyperkalemia.  Will sign off for now please do not hesitate to reconsult as needed or if patient goes to OR for transplant.

## 2018-02-16 NOTE — PROGRESS NOTE ADULT - SUBJECTIVE AND OBJECTIVE BOX
VITAL SIGNS    Subjective: "Hello; I'm feeling OK"     Telemetry: ST 1st degree 100-105 ( 18 Beats WCT)     Vital Signs Last 24 Hrs  T(C): 36.3 (18 @ 09:30), Max: 36.9 (02-15-18 @ 17:30)  T(F): 97.3 (18 @ 09:30), Max: 98.4 (02-15-18 @ 17:30)  HR: 105 (18 @ 09:30) (100 - 105)  BP: --  RR: 18 (18 @ 09:30) (18 - 20)  SpO2: 99% (18 @ 09:30) (97% - 100%)             02-15 @ 07:01  -   @ 07:00  --------------------------------------------------------  IN: 710 mL / OUT: 950 mL / NET: -240 mL     @ 07:01  -   @ 10:58  --------------------------------------------------------  IN: 240 mL / OUT: 0 mL / NET: 240 mL      Daily     Daily Weight in k.8 (2018 08:37)                PHYSICAL EXAM    Neurology: alert and oriented x 3, nonfocal, no gross deficits    CV: (+) LVAD hum     Sternal Wound:  MSI --> healed     Lungs: CTA B/L     Abdomen: soft, nontender, nondistended, positive bowel sounds, (+) Flatus; (+) BM; RLQ drive line site with occlusive dressing C/D/I      :  Voiding               Extremities:  B/L LE (-) edema; negative calf tenderness; (+) 2 DP palpable       acetaminophen Tablet 650 milliGRAM(s) Oral every 6 hours PRN  ascorbic acid 500 milliGRAM(s) Oral two times a day  docusate sodium 100 milliGRAM(s) Oral three times a day  hepatitis B Vaccine - Adult (ENGERIX) 1 milliLiter(s) IntraMuscular once  hydrALAZINE 25 milliGRAM(s) Oral every 8 hours  hydrocortisone 2.5% Cream 1 Application(s) Topical two times a day  iron sucrose IVPB 100 milliGRAM(s) IV Intermittent <User Schedule>  mexiletine 150 milliGRAM(s) Oral two times a day  pantoprazole  Injectable 40 milliGRAM(s) IV Push daily  polyethylene glycol 3350 17 Gram(s) Oral daily  QUEtiapine 50 milliGRAM(s) Oral at bedtime  senna 2 Tablet(s) Oral at bedtime      Physical Therapy Rec:   Home  [  ]   Home w/ PT  [  ]  Rehab  [  ]    Discussed with Cardiothoracic Team at AM rounds.

## 2018-02-16 NOTE — PROGRESS NOTE ADULT - ASSESSMENT
Imp:    Leukocytosis resolving, no evidence of a new infection     Would continue to observe off anti-microbials    No contra-indications to a heart transplant from an ID perspective      Gary Lujan MD  [phone]  After 5pm/weekends [phone]

## 2018-02-16 NOTE — PROGRESS NOTE ADULT - ASSESSMENT
67M PMH Chronic Systolic Heart Failure (ACC/AHA Stage D) due to NICMP s/p LVAD 6/12/17, GIB s/p Cautery (7/25/2017), known AV Malformations, Chronic Anemia due to numerous GIBs (off AC), A-Fib, VT s/p AICD. Pt admitted due to elevated LDH level of 646 (previously 466) for further evaluation and observation.   2/2 VSS ; INR 1.3- continue heparin and coumadin   meds as per CHF team  2/3 - monitor LDH twice a day; continue heparin and coumadin - INR 1.5- coumadin 3 mg today; continue meds as per CHF team   renal sono to evaluate left renal cyst   2/4 RAMP echo, no speed adjustments made  2/5 . Transplant evaluation in progress  2/6  Integrilin initiated, hydralazine 10mg tid  2/7 , INR 2.17, continue Heparin drip per HF. Increase integrilin drip to 1mcg, hydralazine to 25q8 and lasix daily.  2/8 [id number] INR 2.18  c/w heparin gtt intergrilin gTT 1 mcg  D/C lasix and  aldactone and Hold  coumadin tonight  repeat CBC stat  for 9.0/27.5 down from 11.2/34.7  2/9 Near syncope transferred to SDU PRBCx2 given for HCT 21.4 calcium gluconate D50 and Bicarb transferred to 2CTU in  ARF  2/10 leukocytosis improving on cefepime no source of infection  PRBC x2 coumadin on hold   2/11 intubated  bleeding  scan showing active duodenal bleeding. s/p enteroscopy  with APC for 4 AVM Now PRBC up to PRBC x6  2/12 low dose vasopressin  2/13 stable but anemic  2/14 stable no evidence of infection  2/15 transferred to [address] [place] [address] BM today 7.9/23.6 /   2/16 VVS; H&H stable -->D/C Ferrous sulfate; Continue with IV Iron and current medication regimen 67M PMH Chronic Systolic Heart Failure (ACC/AHA Stage D) due to NICMP s/p LVAD 6/12/17, GIB s/p Cautery (7/25/2017), known AV Malformations, Chronic Anemia due to numerous GIBs (off AC), A-Fib, VT s/p AICD. Pt admitted due to elevated LDH level of 646 (previously 466) for further evaluation and observation.   2/2 VSS ; INR 1.3- continue heparin and coumadin   meds as per CHF team  2/3 - monitor LDH twice a day; continue heparin and coumadin - INR 1.5- coumadin 3 mg today; continue meds as per CHF team   renal sono to evaluate left renal cyst   2/4 RAMP echo, no speed adjustments made  2/5 . Transplant evaluation in progress  2/6  Integrilin initiated, hydralazine 10mg tid  2/7 , INR 2.17, continue Heparin drip per HF. Increase integrilin drip to 1mcg, hydralazine to 25q8 and lasix daily.  2/8 [id number] INR 2.18  c/w heparin gtt intergrilin gTT 1 mcg  D/C lasix and  aldactone and Hold  coumadin tonight  repeat CBC stat  for 9.0/27.5 down from 11.2/34.7  2/9 Near syncope transferred to SDU PRBCx2 given for HCT 21.4 calcium gluconate D50 and Bicarb transferred to 2CTU in  ARF  2/10 leukocytosis improving on cefepime no source of infection  PRBC x2 coumadin on hold   2/11 intubated  bleeding  scan showing active duodenal bleeding. s/p enteroscopy  with APC for 4 AVM Now PRBC up to PRBC x6  2/12 low dose vasopressin  2/13 stable but anemic  2/14 stable no evidence of infection  2/15 transferred to [address] [place] [address] BM today 7.9/23.6 /   2/16 VVS; H&H stable -->D/C Ferrous sulfate; Continue with IV Iron and current medication regimen; Potassium supplement 2/2 18 beats WCT and K+ level 3.8

## 2018-02-16 NOTE — PROGRESS NOTE ADULT - SUBJECTIVE AND OBJECTIVE BOX
INFECTIOUS DISEASES FOLLOW UP-- Sujey Lujan  [phone]    This is a follow up note for this  67yMale with  Heart replaced by heart assist device, patient undergoing a pre heart transplant screening process. Leukocytosis improving, no source of infeciton identifed.      ROS:  CONSTITUTIONAL:  No fever, good appetite  CARDIOVASCULAR:  No chest pain or palpitations  RESPIRATORY:  No dyspnea  GASTROINTESTINAL:  No nausea, vomiting, diarrhea, or abdominal pain  GENITOURINARY:  No dysuria  NEUROLOGIC:  No headache,     Allergies    No Known Allergies    Intolerances        ANTIBIOTICS/RELEVANT:  antimicrobials    immunologic:  hepatitis B Vaccine - Adult (ENGERIX) 1 milliLiter(s) IntraMuscular once    OTHER:  acetaminophen   Tablet 650 milliGRAM(s) Oral every 6 hours PRN  ascorbic acid 500 milliGRAM(s) Oral two times a day  docusate sodium 100 milliGRAM(s) Oral three times a day  hydrALAZINE 25 milliGRAM(s) Oral every 8 hours  hydrocortisone 2.5% Cream 1 Application(s) Topical two times a day  iron sucrose IVPB 100 milliGRAM(s) IV Intermittent <User Schedule>  mexiletine 150 milliGRAM(s) Oral two times a day  pantoprazole  Injectable 40 milliGRAM(s) IV Push daily  polyethylene glycol 3350 17 Gram(s) Oral daily  QUEtiapine 50 milliGRAM(s) Oral at bedtime  senna 2 Tablet(s) Oral at bedtime  sodium chloride 0.9% lock flush 3 milliLiter(s) IV Push every 8 hours      Objective:  Vital Signs Last 24 Hrs  T(C): 36.4 (16 Feb 2018 13:30), Max: 36.9 (15 Feb 2018 17:30)  T(F): 97.5 (16 Feb 2018 13:30), Max: 98.4 (15 Feb 2018 17:30)  HR: 104 (16 Feb 2018 13:30) (100 - 105)  BP: --  BP(mean): 80 (16 Feb 2018 09:30) (72 - 88)  RR: 18 (16 Feb 2018 09:30) (18 - 20)  SpO2: 99% (16 Feb 2018 09:30) (97% - 100%)    PHYSICAL EXAM:  Constitutional:no acute distress  Eyes:WALT, EOMI  Ear/Nose/Throat: no oral lesions, 	  Respiratory: clear BL  Cardiovascular: LVAD sounds  exit site dressing dry and intact  Gastrointestinal:soft, (+) BS, no tenderness  Extremities:no e/e/c, arthritis right knee  No Lymphadenopathy  IV sites not inflammed.    LABS:                        7.1    11.7  )-----------( 230      ( 16 Feb 2018 05:58 )             22.0     02-16    140  |  105  |  19  ----------------------------<  90  3.8   |  24  |  1.22    Ca    8.3<L>      16 Feb 2018 05:58  Phos  2.4     02-14  Mg     2.2     02-16    TPro  6.3  /  Alb  3.4  /  TBili  0.5  /  DBili  x   /  AST  33  /  ALT  27  /  AlkPhos  67  02-14    PT/INR - ( 16 Feb 2018 05:58 )   PT: 12.5 sec;   INR: 1.14 ratio         PTT - ( 16 Feb 2018 05:58 )  PTT:30.5 sec      MICROBIOLOGY:            RECENT CULTURES:  02-15 @ 13:22  .Blood Blood-Peripheral  --  --  --    No growth to date.  --  02-15 @ 11:10  .Blood Blood-Peripheral  --  --  --    No growth to date.  --  02-09 @ 16:40  .Blood Blood-Peripheral  --  --  --    No growth at 5 days.  --      RADIOLOGY & ADDITIONAL STUDIES:    < from: Xray Chest 1 View- PORTABLE-Routine (02.16.18 @ 06:16) >  IMPRESSION:   Clear lungs.    < end of copied text >

## 2018-02-17 LAB
ALBUMIN SERPL ELPH-MCNC: 3.1 G/DL — LOW (ref 3.3–5)
ALP SERPL-CCNC: 67 U/L — SIGNIFICANT CHANGE UP (ref 40–120)
ALT FLD-CCNC: 22 U/L — SIGNIFICANT CHANGE UP (ref 10–45)
ANION GAP SERPL CALC-SCNC: 10 MMOL/L — SIGNIFICANT CHANGE UP (ref 5–17)
AST SERPL-CCNC: 39 U/L — SIGNIFICANT CHANGE UP (ref 10–40)
BILIRUB DIRECT SERPL-MCNC: 0.2 MG/DL — SIGNIFICANT CHANGE UP (ref 0–0.2)
BILIRUB INDIRECT FLD-MCNC: 0.3 MG/DL — SIGNIFICANT CHANGE UP (ref 0.2–1)
BILIRUB SERPL-MCNC: 0.5 MG/DL — SIGNIFICANT CHANGE UP (ref 0.2–1.2)
BUN SERPL-MCNC: 15 MG/DL — SIGNIFICANT CHANGE UP (ref 7–23)
CALCIUM SERPL-MCNC: 8.6 MG/DL — SIGNIFICANT CHANGE UP (ref 8.4–10.5)
CHLORIDE SERPL-SCNC: 108 MMOL/L — SIGNIFICANT CHANGE UP (ref 96–108)
CO2 SERPL-SCNC: 23 MMOL/L — SIGNIFICANT CHANGE UP (ref 22–31)
CREAT SERPL-MCNC: 1.15 MG/DL — SIGNIFICANT CHANGE UP (ref 0.5–1.3)
GLUCOSE SERPL-MCNC: 93 MG/DL — SIGNIFICANT CHANGE UP (ref 70–99)
HCT VFR BLD CALC: 24.7 % — LOW (ref 39–50)
HGB BLD-MCNC: 8.1 G/DL — LOW (ref 13–17)
LDH SERPL L TO P-CCNC: 481 U/L — HIGH (ref 50–242)
MCHC RBC-ENTMCNC: 32.3 PG — SIGNIFICANT CHANGE UP (ref 27–34)
MCHC RBC-ENTMCNC: 32.6 GM/DL — SIGNIFICANT CHANGE UP (ref 32–36)
MCV RBC AUTO: 98.9 FL — SIGNIFICANT CHANGE UP (ref 80–100)
PLATELET # BLD AUTO: 276 K/UL — SIGNIFICANT CHANGE UP (ref 150–400)
POTASSIUM SERPL-MCNC: 4.2 MMOL/L — SIGNIFICANT CHANGE UP (ref 3.5–5.3)
POTASSIUM SERPL-SCNC: 4.2 MMOL/L — SIGNIFICANT CHANGE UP (ref 3.5–5.3)
PROT SERPL-MCNC: 6.1 G/DL — SIGNIFICANT CHANGE UP (ref 6–8.3)
RBC # BLD: 2.5 M/UL — LOW (ref 4.2–5.8)
RBC # FLD: 17.5 % — HIGH (ref 10.3–14.5)
SODIUM SERPL-SCNC: 141 MMOL/L — SIGNIFICANT CHANGE UP (ref 135–145)
WBC # BLD: 10 K/UL — SIGNIFICANT CHANGE UP (ref 3.8–10.5)
WBC # FLD AUTO: 10 K/UL — SIGNIFICANT CHANGE UP (ref 3.8–10.5)

## 2018-02-17 PROCEDURE — 99233 SBSQ HOSP IP/OBS HIGH 50: CPT | Mod: 25

## 2018-02-17 PROCEDURE — 93750 INTERROGATION VAD IN PERSON: CPT

## 2018-02-17 RX ADMIN — Medication 500 MILLIGRAM(S): at 17:11

## 2018-02-17 RX ADMIN — SODIUM CHLORIDE 3 MILLILITER(S): 9 INJECTION INTRAMUSCULAR; INTRAVENOUS; SUBCUTANEOUS at 06:34

## 2018-02-17 RX ADMIN — MEXILETINE HYDROCHLORIDE 150 MILLIGRAM(S): 150 CAPSULE ORAL at 17:11

## 2018-02-17 RX ADMIN — QUETIAPINE FUMARATE 50 MILLIGRAM(S): 200 TABLET, FILM COATED ORAL at 21:39

## 2018-02-17 RX ADMIN — SODIUM CHLORIDE 3 MILLILITER(S): 9 INJECTION INTRAMUSCULAR; INTRAVENOUS; SUBCUTANEOUS at 21:48

## 2018-02-17 RX ADMIN — PANTOPRAZOLE SODIUM 40 MILLIGRAM(S): 20 TABLET, DELAYED RELEASE ORAL at 14:19

## 2018-02-17 RX ADMIN — IRON SUCROSE 210 MILLIGRAM(S): 20 INJECTION, SOLUTION INTRAVENOUS at 08:25

## 2018-02-17 RX ADMIN — SODIUM CHLORIDE 3 MILLILITER(S): 9 INJECTION INTRAMUSCULAR; INTRAVENOUS; SUBCUTANEOUS at 14:14

## 2018-02-17 RX ADMIN — Medication 25 MILLIGRAM(S): at 21:39

## 2018-02-17 RX ADMIN — MEXILETINE HYDROCHLORIDE 150 MILLIGRAM(S): 150 CAPSULE ORAL at 06:31

## 2018-02-17 RX ADMIN — Medication 25 MILLIGRAM(S): at 06:32

## 2018-02-17 RX ADMIN — Medication 1 APPLICATION(S): at 21:38

## 2018-02-17 RX ADMIN — Medication 500 MILLIGRAM(S): at 06:32

## 2018-02-17 RX ADMIN — Medication 25 MILLIGRAM(S): at 14:19

## 2018-02-17 RX ADMIN — Medication 1 APPLICATION(S): at 06:32

## 2018-02-17 NOTE — PROGRESS NOTE ADULT - PROBLEM SELECTOR PLAN 1
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   No evidence of joshua pump malfunction at this time although his LDH is increased.

## 2018-02-17 NOTE — PROGRESS NOTE ADULT - ASSESSMENT
67M PMH Chronic Systolic Heart Failure (ACC/AHA Stage D) due to NICMP s/p LVAD 6/12/17, GIB s/p Cautery (7/25/2017), known AV Malformations, Chronic Anemia due to numerous GIBs (off AC), A-Fib, VT s/p AICD. Pt admitted due to elevated LDH level of 646 (previously 466) for further evaluation and observation.   2/2 VSS ; INR 1.3- continue heparin and coumadin   meds as per CHF team  2/3 - monitor LDH twice a day; continue heparin and coumadin - INR 1.5- coumadin 3 mg today; continue meds as per CHF team   renal sono to evaluate left renal cyst   2/4 RAMP echo, no speed adjustments made  2/5 . Transplant evaluation in progress  2/6  Integrilin initiated, hydralazine 10mg tid  2/7 , INR 2.17, continue Heparin drip per HF. Increase integrilin drip to 1mcg, hydralazine to 25q8 and lasix daily.  2/8 [id number] INR 2.18  c/w heparin gtt intergrilin gTT 1 mcg  D/C lasix and  aldactone and Hold  coumadin tonight  repeat CBC stat  for 9.0/27.5 down from 11.2/34.7  2/9 Near syncope transferred to SDU PRBCx2 given for HCT 21.4 calcium gluconate D50 and Bicarb transferred to 2CTU in  ARF  2/10 leukocytosis improving on cefepime no source of infection  PRBC x2 coumadin on hold   2/11 intubated  bleeding  scan showing active duodenal bleeding. s/p enteroscopy  with APC for 4 AVM Now PRBC up to PRBC x6  2/12 low dose vasopressin  2/13 stable but anemic  2/14 stable no evidence of infection  2/15 transferred to [address] [place] [address] BM today 7.9/23.6 /   2/16 VVS; H&H stable -->D/C Ferrous sulfate; Continue with IV Iron and current medication regimen; Potassium supplement 2/2 18 beats WCT and K+ level 3.8   2/17 Continue with current medication regimen; Monitor LDH

## 2018-02-17 NOTE — PROGRESS NOTE ADULT - SUBJECTIVE AND OBJECTIVE BOX
VITAL SIGNS    Subjective: "I feel pretty good today"     Telemetry: NSR       Vital Signs Last 24 Hrs  T(C): 36.7 (18 @ 14:00), Max: 36.8 (18 @ 17:30)  T(F): 98.1 (18 @ 14:00), Max: 98.2 (18 @ 17:30)  HR: 96 (18 @ 14:00) (87 - 97)  BP: 114/77 (18 @ 14:00) (90/74 - 114/77)  RR: 18 (18 @ 14:00) (18 - 18)  SpO2: 99% (18 @ 14:00) (97% - 100%)              @ 07:01  -   @ 07:00  --------------------------------------------------------  IN: 1440 mL / OUT: 1150 mL / NET: 290 mL     @ 07:01  -   @ 15:54  --------------------------------------------------------  IN: 840 mL / OUT: 300 mL / NET: 540 mL    Daily     Daily Weight in k (2018 08:00)    PHYSICAL EXAM    Neurology: alert and oriented x 3, nonfocal, no gross deficits    CV: (+) LVAD Hum     Sternal Wound:  MSI -->CDI , sternum stable    Lungs: CTA B/L     Abdomen: soft, nontender, nondistended, positive bowel sounds, (+) Flatus; (+) BM; RLQ Driveline site C/D/I     :  Voiding               Extremities:  B/L LE (+) edema; negative calf tenderness; (+) 2 DP palpable        acetaminophen Tablet 650 milliGRAM(s) Oral every 6 hours PRN  ascorbic acid 500 milliGRAM(s) Oral two times a day  docusate sodium 100 milliGRAM(s) Oral three times a day  hydrALAZINE 25 milliGRAM(s) Oral every 8 hours  hydrocortisone 2.5% Cream 1 Application(s) Topical two times a day  iron sucrose IVPB 100 milliGRAM(s) IV Intermittent <User Schedule>  mexiletine 150 milliGRAM(s) Oral two times a day  pantoprazole  Injectable 40 milliGRAM(s) IV Push daily  polyethylene glycol 3350 17 Gram(s) Oral daily  QUEtiapine 50 milliGRAM(s) Oral at bedtime  senna 2 Tablet(s) Oral at bedtime    Physical Therapy Rec:   Home  [  ]   Home w/ PT  [ X ]  Rehab  [  ]    Discussed with Cardiothoracic Team at AM rounds.

## 2018-02-17 NOTE — PROGRESS NOTE ADULT - ASSESSMENT
Mr. 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 He was admitted for probable pump thrombosis characterized by elevated LDH in the setting of being off anticoagulation for several months. 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 He was treated with APC of an AVM in the duodenum over last weekend and required blood transfusions. His intial leukocytosis resolved without specific therapy.    Currently he is stable, with improving anemia following IV iron infusion. His leukocytosis has improved and he has no overt evidence of infection. He feels well.     He is transplantable and is currently listed status 1A.    Please call me with questions at [phone]. Plan discussed with CTU team and Dr. Parkre.

## 2018-02-17 NOTE — PROGRESS NOTE ADULT - SUBJECTIVE AND OBJECTIVE BOX
Subjective: No overnight events. He feels well without any complaints. Walking daily with some assistance.     Medications:  acetaminophen   Tablet 650 milliGRAM(s) Oral every 6 hours PRN  ascorbic acid 500 milliGRAM(s) Oral two times a day  docusate sodium 100 milliGRAM(s) Oral three times a day  hydrALAZINE 25 milliGRAM(s) Oral every 8 hours  hydrocortisone 2.5% Cream 1 Application(s) Topical two times a day  iron sucrose IVPB 100 milliGRAM(s) IV Intermittent <User Schedule>  mexiletine 150 milliGRAM(s) Oral two times a day  pantoprazole  Injectable 40 milliGRAM(s) IV Push daily  polyethylene glycol 3350 17 Gram(s) Oral daily  QUEtiapine 50 milliGRAM(s) Oral at bedtime  senna 2 Tablet(s) Oral at bedtime  sodium chloride 0.9% lock flush 3 milliLiter(s) IV Push every 8 hours    Physical Exam:    Vital Signs Last 24 Hrs  T(C): 36.7 (17 Feb 2018 06:00), Max: 36.8 (16 Feb 2018 17:30)  T(F): 98.1 (17 Feb 2018 06:00), Max: 98.2 (16 Feb 2018 17:30)  HR: 87 (17 Feb 2018 06:00) (87 - 105)  BP: 111/72 (17 Feb 2018 06:00) (90/74 - 111/72)  BP(mean): 85 (17 Feb 2018 06:00) (76 - 89)  RR: 18 (17 Feb 2018 06:00) (18 - 18)  SpO2: 98% (17 Feb 2018 06:00) (97% - 100%)    I&O's Summary    16 Feb 2018 07:01  -  17 Feb 2018 07:00  --------------------------------------------------------  IN: 1440 mL / OUT: 1150 mL / NET: 290 mL    General: No distress. Comfortable.  HEENT: EOM intact.  Neck: Neck supple. JVP not elevated. No masses  Chest: Clear to auscultation bilaterally  CV: Typical LVAD sounds. No distal pulses  Abdomen: Soft, non-distended, non-tender  Driveline exit site: Clean, dry, and intact  Skin: No rashes or skin breakdown  Neurology: Alert and oriented times three. Sensation intact  Psych: Affect normal    LVAD Interrogation: Heart Mate II  RPMs: 9200  Power: 6.7  Flow: 5.0  PI: 5.4  Event: None  No programming changes were made    Labs:                        8.1    10.0  )-----------( 276      ( 17 Feb 2018 06:34 )             24.7     02-17    141  |  108  |  15  ----------------------------<  93  4.2   |  23  |  1.15    Ca    8.6      17 Feb 2018 06:34  Mg     2.2     02-16    PT/INR - ( 16 Feb 2018 05:58 )   PT: 12.5 sec;   INR: 1.14 ratio      PTT - ( 16 Feb 2018 05:58 )  PTT:30.5 sec    Lactate Dehydrogenase, Serum: 481 U/L (02-17 @ 06:34)  Lactate Dehydrogenase, Serum: 392 U/L (02-16 @ 05:58)  Lactate Dehydrogenase, Serum: 408 U/L (02-14 @ 22:32)

## 2018-02-17 NOTE — PROGRESS NOTE ADULT - PROBLEM SELECTOR PLAN 3
Hydralazine 25 mg PO TID   Mexiletine 150 mg PO BID   Transplant Listed for OHT, blood type B, UNOS 1A status. No crossmatch needed (cPRA 0%).    Check daily BMP. LDH supplement k prn.  MAP goal 70 -80  A/C on hold  Plan of care discussed with covering attending

## 2018-02-18 LAB
ANION GAP SERPL CALC-SCNC: 11 MMOL/L — SIGNIFICANT CHANGE UP (ref 5–17)
APTT BLD: 28.7 SEC — SIGNIFICANT CHANGE UP (ref 27.5–37.4)
BUN SERPL-MCNC: 15 MG/DL — SIGNIFICANT CHANGE UP (ref 7–23)
CALCIUM SERPL-MCNC: 8.6 MG/DL — SIGNIFICANT CHANGE UP (ref 8.4–10.5)
CHLORIDE SERPL-SCNC: 105 MMOL/L — SIGNIFICANT CHANGE UP (ref 96–108)
CO2 SERPL-SCNC: 23 MMOL/L — SIGNIFICANT CHANGE UP (ref 22–31)
CREAT SERPL-MCNC: 1.12 MG/DL — SIGNIFICANT CHANGE UP (ref 0.5–1.3)
GLUCOSE SERPL-MCNC: 93 MG/DL — SIGNIFICANT CHANGE UP (ref 70–99)
HCT VFR BLD CALC: 23.3 % — LOW (ref 39–50)
HGB BLD-MCNC: 7.7 G/DL — LOW (ref 13–17)
INR BLD: 1.13 RATIO — SIGNIFICANT CHANGE UP (ref 0.88–1.16)
LDH SERPL L TO P-CCNC: 579 U/L — HIGH (ref 50–242)
LDH SERPL L TO P-CCNC: 723 U/L — HIGH (ref 50–242)
MCHC RBC-ENTMCNC: 32.9 PG — SIGNIFICANT CHANGE UP (ref 27–34)
MCHC RBC-ENTMCNC: 33.2 GM/DL — SIGNIFICANT CHANGE UP (ref 32–36)
MCV RBC AUTO: 99.2 FL — SIGNIFICANT CHANGE UP (ref 80–100)
PLATELET # BLD AUTO: 299 K/UL — SIGNIFICANT CHANGE UP (ref 150–400)
POTASSIUM SERPL-MCNC: 4.5 MMOL/L — SIGNIFICANT CHANGE UP (ref 3.5–5.3)
POTASSIUM SERPL-SCNC: 4.5 MMOL/L — SIGNIFICANT CHANGE UP (ref 3.5–5.3)
PROTHROM AB SERPL-ACNC: 12.2 SEC — SIGNIFICANT CHANGE UP (ref 9.8–12.7)
RBC # BLD: 2.35 M/UL — LOW (ref 4.2–5.8)
RBC # FLD: 17.6 % — HIGH (ref 10.3–14.5)
SODIUM SERPL-SCNC: 139 MMOL/L — SIGNIFICANT CHANGE UP (ref 135–145)
WBC # BLD: 10.7 K/UL — HIGH (ref 3.8–10.5)
WBC # FLD AUTO: 10.7 K/UL — HIGH (ref 3.8–10.5)

## 2018-02-18 PROCEDURE — 99233 SBSQ HOSP IP/OBS HIGH 50: CPT | Mod: 25

## 2018-02-18 PROCEDURE — 93750 INTERROGATION VAD IN PERSON: CPT

## 2018-02-18 RX ORDER — HEPARIN SODIUM 5000 [USP'U]/ML
600 INJECTION INTRAVENOUS; SUBCUTANEOUS
Qty: 25000 | Refills: 0 | Status: DISCONTINUED | OUTPATIENT
Start: 2018-02-18 | End: 2018-02-19

## 2018-02-18 RX ADMIN — SODIUM CHLORIDE 3 MILLILITER(S): 9 INJECTION INTRAMUSCULAR; INTRAVENOUS; SUBCUTANEOUS at 05:47

## 2018-02-18 RX ADMIN — IRON SUCROSE 210 MILLIGRAM(S): 20 INJECTION, SOLUTION INTRAVENOUS at 08:44

## 2018-02-18 RX ADMIN — Medication 1 APPLICATION(S): at 17:42

## 2018-02-18 RX ADMIN — SODIUM CHLORIDE 3 MILLILITER(S): 9 INJECTION INTRAMUSCULAR; INTRAVENOUS; SUBCUTANEOUS at 22:06

## 2018-02-18 RX ADMIN — MEXILETINE HYDROCHLORIDE 150 MILLIGRAM(S): 150 CAPSULE ORAL at 17:57

## 2018-02-18 RX ADMIN — Medication 500 MILLIGRAM(S): at 05:45

## 2018-02-18 RX ADMIN — Medication 100 MILLIGRAM(S): at 14:46

## 2018-02-18 RX ADMIN — MEXILETINE HYDROCHLORIDE 150 MILLIGRAM(S): 150 CAPSULE ORAL at 05:45

## 2018-02-18 RX ADMIN — Medication 25 MILLIGRAM(S): at 05:46

## 2018-02-18 RX ADMIN — Medication 500 MILLIGRAM(S): at 17:42

## 2018-02-18 RX ADMIN — POLYETHYLENE GLYCOL 3350 17 GRAM(S): 17 POWDER, FOR SOLUTION ORAL at 14:46

## 2018-02-18 RX ADMIN — QUETIAPINE FUMARATE 50 MILLIGRAM(S): 200 TABLET, FILM COATED ORAL at 22:12

## 2018-02-18 RX ADMIN — Medication 1 APPLICATION(S): at 05:46

## 2018-02-18 RX ADMIN — Medication 25 MILLIGRAM(S): at 22:12

## 2018-02-18 RX ADMIN — PANTOPRAZOLE SODIUM 40 MILLIGRAM(S): 20 TABLET, DELAYED RELEASE ORAL at 14:46

## 2018-02-18 RX ADMIN — HEPARIN SODIUM 6 UNIT(S)/HR: 5000 INJECTION INTRAVENOUS; SUBCUTANEOUS at 17:43

## 2018-02-18 RX ADMIN — Medication 25 MILLIGRAM(S): at 14:46

## 2018-02-18 RX ADMIN — SODIUM CHLORIDE 3 MILLILITER(S): 9 INJECTION INTRAMUSCULAR; INTRAVENOUS; SUBCUTANEOUS at 14:46

## 2018-02-18 NOTE — PROGRESS NOTE ADULT - ASSESSMENT
Mr. 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 He was admitted for probable pump thrombosis characterized by elevated LDH in the setting of being off anticoagulation for several months. 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 He was treated with APC of an AVM in the duodenum over last weekend and required blood transfusions. His intial leukocytosis resolved without specific therapy.    Currently he is stable, with improving anemia following IV iron infusion. His leukocytosis has improved and he has no overt evidence of infection. He feels well.     He is transplantable and is currently listed status 1A.    Please call me with questions at [phone]. Plan discussed with CTU team and Dr. Parker.

## 2018-02-18 NOTE — PROGRESS NOTE ADULT - SUBJECTIVE AND OBJECTIVE BOX
Subjective: No current complaints. He feels well. No dizziness. Normal stool color.     Medications:  acetaminophen   Tablet 650 milliGRAM(s) Oral every 6 hours PRN  ascorbic acid 500 milliGRAM(s) Oral two times a day  docusate sodium 100 milliGRAM(s) Oral three times a day  hydrALAZINE 25 milliGRAM(s) Oral every 8 hours  hydrocortisone 2.5% Cream 1 Application(s) Topical two times a day  mexiletine 150 milliGRAM(s) Oral two times a day  pantoprazole  Injectable 40 milliGRAM(s) IV Push daily  polyethylene glycol 3350 17 Gram(s) Oral daily  QUEtiapine 50 milliGRAM(s) Oral at bedtime  senna 2 Tablet(s) Oral at bedtime  sodium chloride 0.9% lock flush 3 milliLiter(s) IV Push every 8 hours    Physical Exam:      Vital Signs Last 24 Hrs  T(C): 36.7 (2018 14:42), Max: 36.8 (2018 17:55)  T(F): 98.1 (2018 14:42), Max: 98.2 (2018 17:55)  HR: 88 (2018 14:42) (88 - 94)  BP: 97/70 (2018 14:42) (95/67 - 118/85)  BP(mean): 79 (2018 14:42) (76 - 96)  RR: 18 (2018 14:42) (18 - 18)  SpO2: 99% (2018 14:42) (97% - 100%)    Daily Weight in k.9 (2018 08:16)    I&O's Summary    2018 07:  -  2018 07:00  --------------------------------------------------------  IN: 1580 mL / OUT: 1500 mL / NET: 80 mL    2018 07:01  -  2018 16:43  --------------------------------------------------------  IN: 720 mL / OUT: 350 mL / NET: 370 mL    General: No distress. Comfortable.  HEENT: EOM intact.  Neck: Neck supple. JVP not elevated. No masses  Chest: Clear to auscultation bilaterally  CV: Typical LVAD sounds. No distal pulses  Abdomen: Soft, non-distended, non-tender  Driveline exit site: Clean, dry, and intact  Skin: No rashes or skin breakdown  Neurology: Alert and oriented times three. Sensation intact  Psych: Affect normal    LVAD Interrogation: Heart Mate II  RPMs: 9200  Power: 5.8  Flow: 5.4  PI: 6.2  Event:  None  No programming changes were made    Labs:                        7.7    10.7  )-----------( 299      ( 2018 05:50 )             23.3         139  |  105  |  15  ----------------------------<  93  4.5   |  23  |  1.12    Ca    8.6      2018 05:50    TPro  6.1  /  Alb  3.1<L>  /  TBili  0.5  /  DBili  0.2  /  AST  39  /  ALT  22  /  AlkPhos  67      PT/INR - ( 2018 05:50 )   PT: 12.2 sec;   INR: 1.13 ratio      PTT - ( 2018 05:50 )  PTT:28.7 sec    Lactate Dehydrogenase, Serum: 723 U/L ( @ 16:04)  Lactate Dehydrogenase, Serum: 579 U/L ( @ 05:50)  Lactate Dehydrogenase, Serum: 481 U/L ( @ 06:34)  Lactate Dehydrogenase, Serum: 392 U/L ( @ 05:58)

## 2018-02-18 NOTE — PROGRESS NOTE ADULT - ASSESSMENT
67M PMH Chronic Systolic Heart Failure (ACC/AHA Stage D) due to NICMP s/p LVAD 6/12/17, GIB s/p Cautery (7/25/2017), known AV Malformations, Chronic Anemia due to numerous GIBs (off AC), A-Fib, VT s/p AICD. Pt admitted due to elevated LDH level of 646 (previously 466) for further evaluation and observation.   2/2 VSS ; INR 1.3- continue heparin and coumadin   meds as per CHF team  2/3 - monitor LDH twice a day; continue heparin and coumadin - INR 1.5- coumadin 3 mg today; continue meds as per CHF team   renal sono to evaluate left renal cyst   2/4 RAMP echo, no speed adjustments made  2/5 . Transplant evaluation in progress  2/6  Integrilin initiated, hydralazine 10mg tid  2/7 , INR 2.17, continue Heparin drip per HF. Increase integrilin drip to 1mcg, hydralazine to 25q8 and lasix daily.  2/8 [id number] INR 2.18  c/w heparin gtt intergrilin gTT 1 mcg  D/C lasix and  aldactone and Hold  coumadin tonight  repeat CBC stat  for 9.0/27.5 down from 11.2/34.7  2/9 Near syncope transferred to SDU PRBCx2 given for HCT 21.4 calcium gluconate D50 and Bicarb transferred to 2CTU in  ARF  2/10 leukocytosis improving on cefepime no source of infection  PRBC x2 coumadin on hold   2/11 intubated  bleeding  scan showing active duodenal bleeding. s/p enteroscopy  with APC for 4 AVM Now PRBC up to PRBC x6  2/12 low dose vasopressin  2/13 stable but anemic  2/14 stable no evidence of infection  2/15 transferred to [address] [place] [address] BM today 7.9/23.6 /   2/16 VVS; H&H stable -->D/C Ferrous sulfate; Continue with IV Iron and current medication regimen; Potassium supplement 2/2 18 beats WCT and K+ level 3.8   2/17 Continue with current medication regimen; Monitor LDH   2/18/18 - coumadin on hold d/t gib- no complaints-d/c planning

## 2018-02-19 LAB
ANION GAP SERPL CALC-SCNC: 11 MMOL/L — SIGNIFICANT CHANGE UP (ref 5–17)
ANION GAP SERPL CALC-SCNC: 8 MMOL/L — SIGNIFICANT CHANGE UP (ref 5–17)
APTT BLD: 31.6 SEC — SIGNIFICANT CHANGE UP (ref 27.5–37.4)
APTT BLD: 32.3 SEC — SIGNIFICANT CHANGE UP (ref 27.5–37.4)
APTT BLD: 42.7 SEC — HIGH (ref 27.5–37.4)
APTT BLD: 46.5 SEC — HIGH (ref 27.5–37.4)
BUN SERPL-MCNC: 14 MG/DL — SIGNIFICANT CHANGE UP (ref 7–23)
BUN SERPL-MCNC: 15 MG/DL — SIGNIFICANT CHANGE UP (ref 7–23)
CALCIUM SERPL-MCNC: 8.5 MG/DL — SIGNIFICANT CHANGE UP (ref 8.4–10.5)
CALCIUM SERPL-MCNC: 8.6 MG/DL — SIGNIFICANT CHANGE UP (ref 8.4–10.5)
CHLORIDE SERPL-SCNC: 102 MMOL/L — SIGNIFICANT CHANGE UP (ref 96–108)
CHLORIDE SERPL-SCNC: 104 MMOL/L — SIGNIFICANT CHANGE UP (ref 96–108)
CO2 SERPL-SCNC: 24 MMOL/L — SIGNIFICANT CHANGE UP (ref 22–31)
CO2 SERPL-SCNC: 27 MMOL/L — SIGNIFICANT CHANGE UP (ref 22–31)
CREAT SERPL-MCNC: 1.14 MG/DL — SIGNIFICANT CHANGE UP (ref 0.5–1.3)
CREAT SERPL-MCNC: 1.27 MG/DL — SIGNIFICANT CHANGE UP (ref 0.5–1.3)
GLUCOSE SERPL-MCNC: 106 MG/DL — HIGH (ref 70–99)
GLUCOSE SERPL-MCNC: 94 MG/DL — SIGNIFICANT CHANGE UP (ref 70–99)
HCT VFR BLD CALC: 25.2 % — LOW (ref 39–50)
HCT VFR BLD CALC: 26.4 % — LOW (ref 39–50)
HCT VFR BLD CALC: 26.6 % — LOW (ref 39–50)
HGB BLD-MCNC: 8.1 G/DL — LOW (ref 13–17)
HGB BLD-MCNC: 8.2 G/DL — LOW (ref 13–17)
HGB BLD-MCNC: 8.5 G/DL — LOW (ref 13–17)
INR BLD: 1.06 RATIO — SIGNIFICANT CHANGE UP (ref 0.88–1.16)
LDH SERPL L TO P-CCNC: 611 U/L — HIGH (ref 50–242)
LDH SERPL L TO P-CCNC: 687 U/L — HIGH (ref 50–242)
MAGNESIUM SERPL-MCNC: 1.9 MG/DL — SIGNIFICANT CHANGE UP (ref 1.6–2.6)
MCHC RBC-ENTMCNC: 30.8 GM/DL — LOW (ref 32–36)
MCHC RBC-ENTMCNC: 30.8 PG — SIGNIFICANT CHANGE UP (ref 27–34)
MCHC RBC-ENTMCNC: 32 GM/DL — SIGNIFICANT CHANGE UP (ref 32–36)
MCHC RBC-ENTMCNC: 32.4 PG — SIGNIFICANT CHANGE UP (ref 27–34)
MCHC RBC-ENTMCNC: 32.5 GM/DL — SIGNIFICANT CHANGE UP (ref 32–36)
MCHC RBC-ENTMCNC: 32.5 PG — SIGNIFICANT CHANGE UP (ref 27–34)
MCV RBC AUTO: 100 FL — SIGNIFICANT CHANGE UP (ref 80–100)
MCV RBC AUTO: 102 FL — HIGH (ref 80–100)
MCV RBC AUTO: 99.6 FL — SIGNIFICANT CHANGE UP (ref 80–100)
PLATELET # BLD AUTO: 310 K/UL — SIGNIFICANT CHANGE UP (ref 150–400)
PLATELET # BLD AUTO: 324 K/UL — SIGNIFICANT CHANGE UP (ref 150–400)
PLATELET # BLD AUTO: 354 K/UL — SIGNIFICANT CHANGE UP (ref 150–400)
POTASSIUM SERPL-MCNC: 4.2 MMOL/L — SIGNIFICANT CHANGE UP (ref 3.5–5.3)
POTASSIUM SERPL-MCNC: 4.3 MMOL/L — SIGNIFICANT CHANGE UP (ref 3.5–5.3)
POTASSIUM SERPL-SCNC: 4.2 MMOL/L — SIGNIFICANT CHANGE UP (ref 3.5–5.3)
POTASSIUM SERPL-SCNC: 4.3 MMOL/L — SIGNIFICANT CHANGE UP (ref 3.5–5.3)
PROTHROM AB SERPL-ACNC: 11.6 SEC — SIGNIFICANT CHANGE UP (ref 9.8–12.7)
RBC # BLD: 2.54 M/UL — LOW (ref 4.2–5.8)
RBC # BLD: 2.62 M/UL — LOW (ref 4.2–5.8)
RBC # BLD: 2.64 M/UL — LOW (ref 4.2–5.8)
RBC # FLD: 18.7 % — HIGH (ref 10.3–14.5)
RBC # FLD: 18.8 % — HIGH (ref 10.3–14.5)
RBC # FLD: 19.2 % — HIGH (ref 10.3–14.5)
SODIUM SERPL-SCNC: 137 MMOL/L — SIGNIFICANT CHANGE UP (ref 135–145)
SODIUM SERPL-SCNC: 139 MMOL/L — SIGNIFICANT CHANGE UP (ref 135–145)
WBC # BLD: 11.3 K/UL — HIGH (ref 3.8–10.5)
WBC # BLD: 11.3 K/UL — HIGH (ref 3.8–10.5)
WBC # BLD: 14.1 K/UL — HIGH (ref 3.8–10.5)
WBC # FLD AUTO: 11.3 K/UL — HIGH (ref 3.8–10.5)
WBC # FLD AUTO: 11.3 K/UL — HIGH (ref 3.8–10.5)
WBC # FLD AUTO: 14.1 K/UL — HIGH (ref 3.8–10.5)

## 2018-02-19 PROCEDURE — 93750 INTERROGATION VAD IN PERSON: CPT

## 2018-02-19 PROCEDURE — 99232 SBSQ HOSP IP/OBS MODERATE 35: CPT

## 2018-02-19 PROCEDURE — 99233 SBSQ HOSP IP/OBS HIGH 50: CPT | Mod: 25

## 2018-02-19 RX ORDER — MAGNESIUM SULFATE 500 MG/ML
1 VIAL (ML) INJECTION ONCE
Qty: 0 | Refills: 0 | Status: COMPLETED | OUTPATIENT
Start: 2018-02-19 | End: 2018-02-19

## 2018-02-19 RX ORDER — HEPARIN SODIUM 5000 [USP'U]/ML
700 INJECTION INTRAVENOUS; SUBCUTANEOUS
Qty: 25000 | Refills: 0 | Status: DISCONTINUED | OUTPATIENT
Start: 2018-02-19 | End: 2018-02-19

## 2018-02-19 RX ORDER — HEPARIN SODIUM 5000 [USP'U]/ML
800 INJECTION INTRAVENOUS; SUBCUTANEOUS
Qty: 25000 | Refills: 0 | Status: DISCONTINUED | OUTPATIENT
Start: 2018-02-19 | End: 2018-02-23

## 2018-02-19 RX ADMIN — Medication 100 GRAM(S): at 23:17

## 2018-02-19 RX ADMIN — Medication 25 MILLIGRAM(S): at 05:49

## 2018-02-19 RX ADMIN — SODIUM CHLORIDE 3 MILLILITER(S): 9 INJECTION INTRAMUSCULAR; INTRAVENOUS; SUBCUTANEOUS at 21:53

## 2018-02-19 RX ADMIN — MEXILETINE HYDROCHLORIDE 150 MILLIGRAM(S): 150 CAPSULE ORAL at 17:43

## 2018-02-19 RX ADMIN — Medication 500 MILLIGRAM(S): at 17:43

## 2018-02-19 RX ADMIN — QUETIAPINE FUMARATE 50 MILLIGRAM(S): 200 TABLET, FILM COATED ORAL at 21:55

## 2018-02-19 RX ADMIN — HEPARIN SODIUM 8 UNIT(S)/HR: 5000 INJECTION INTRAVENOUS; SUBCUTANEOUS at 10:41

## 2018-02-19 RX ADMIN — PANTOPRAZOLE SODIUM 40 MILLIGRAM(S): 20 TABLET, DELAYED RELEASE ORAL at 13:15

## 2018-02-19 RX ADMIN — MEXILETINE HYDROCHLORIDE 150 MILLIGRAM(S): 150 CAPSULE ORAL at 05:49

## 2018-02-19 RX ADMIN — Medication 100 MILLIGRAM(S): at 21:55

## 2018-02-19 RX ADMIN — Medication 500 MILLIGRAM(S): at 05:49

## 2018-02-19 RX ADMIN — HEPARIN SODIUM 7 UNIT(S)/HR: 5000 INJECTION INTRAVENOUS; SUBCUTANEOUS at 09:21

## 2018-02-19 RX ADMIN — Medication 100 MILLIGRAM(S): at 13:15

## 2018-02-19 RX ADMIN — HEPARIN SODIUM 8 UNIT(S)/HR: 5000 INJECTION INTRAVENOUS; SUBCUTANEOUS at 18:51

## 2018-02-19 RX ADMIN — Medication 25 MILLIGRAM(S): at 13:15

## 2018-02-19 RX ADMIN — SODIUM CHLORIDE 3 MILLILITER(S): 9 INJECTION INTRAMUSCULAR; INTRAVENOUS; SUBCUTANEOUS at 05:25

## 2018-02-19 RX ADMIN — SODIUM CHLORIDE 3 MILLILITER(S): 9 INJECTION INTRAMUSCULAR; INTRAVENOUS; SUBCUTANEOUS at 13:15

## 2018-02-19 RX ADMIN — Medication 25 MILLIGRAM(S): at 21:55

## 2018-02-19 RX ADMIN — HEPARIN SODIUM 7 UNIT(S)/HR: 5000 INJECTION INTRAVENOUS; SUBCUTANEOUS at 01:26

## 2018-02-19 NOTE — PROGRESS NOTE ADULT - PROBLEM SELECTOR PLAN 4
Will attempt to minimize transfusions to avoid sensitization.   Currently no evidence of active bleeding.   Iron stores repleted with IV iron.

## 2018-02-19 NOTE — PROGRESS NOTE ADULT - SUBJECTIVE AND OBJECTIVE BOX
Subjective: No current complaints. He feels well without shortness of breath. Ambulating with some assistance from walker. Heparin started yesterday evening due to increasing LDH.     Medications:  acetaminophen   Tablet 650 milliGRAM(s) Oral every 6 hours PRN  ascorbic acid 500 milliGRAM(s) Oral two times a day  docusate sodium 100 milliGRAM(s) Oral three times a day  heparin  Infusion 700 Unit(s)/Hr IV Continuous <Continuous>  hydrALAZINE 25 milliGRAM(s) Oral every 8 hours  hydrocortisone 2.5% Cream 1 Application(s) Topical two times a day  mexiletine 150 milliGRAM(s) Oral two times a day  pantoprazole  Injectable 40 milliGRAM(s) IV Push daily  polyethylene glycol 3350 17 Gram(s) Oral daily  QUEtiapine 50 milliGRAM(s) Oral at bedtime  senna 2 Tablet(s) Oral at bedtime  sodium chloride 0.9% lock flush 3 milliLiter(s) IV Push every 8 hours    Physical Exam:    Vitals:  T(C): 36.4 (18 @ 05:47), Max: 36.7 (18 @ 14:42)  HR: 98 (18 @ 05:47) (76 - 98)  BP: 110/84 (18 @ 05:47) (95/67 - 126/84)  BP(mean): 93 (18 @ 05:47) (76 - 98)  RR: 17 (18 @ 05:47) (16 - 18)  SpO2: 98% (18 @ 05:47) (96% - 99%)      Daily Weight in k.9 (2018 08:16)    I&O's Summary    2018 07:01  -  2018 07:00  --------------------------------------------------------  IN: 1152 mL / OUT: 1600 mL / NET: -448 mL    General: No distress. Comfortable.  HEENT: EOM intact.  Neck: Neck supple. JVP not elevated. No masses  Chest: Clear to auscultation bilaterally  CV: Typical LVAD sounds. No distal pulses. No edema.   Abdomen: Soft, non-distended, non-tender  Driveline exit site: Clean, dry, and intact  Skin: No rashes or skin breakdown  Neurology: Alert and oriented times three. Sensation intact  Psych: Affect normal    LVAD Interrogation: Heart Mate II  RPMs:  Power:  Flow:  PI:  Event:  No programming changes were made    Labs:                        8.2    11.3  )-----------( 324      ( 2018 06:51 )             25.2         139  |  104  |  14  ----------------------------<  94  4.3   |  24  |  1.27    Ca    8.5      2018 06:51  Mg     1.9         TPro  6.1  /  Alb  3.1<L>  /  TBili  0.5  /  DBili  0.2  /  AST  39  /  ALT  22  /  AlkPhos  67      PT/INR - ( 2018 06:51 )   PT: 11.6 sec;   INR: 1.06 ratio      PTT - ( 2018 06:51 )  PTT:31.6 sec    Lactate Dehydrogenase, Serum: 611 U/L ( @ 06:51)  Lactate Dehydrogenase, Serum: 723 U/L ( @ 16:04)  Lactate Dehydrogenase, Serum: 579 U/L ( @ 05:50)  Lactate Dehydrogenase, Serum: 481 U/L ( @ 06:34)

## 2018-02-19 NOTE — PROGRESS NOTE ADULT - ASSESSMENT
67M PMH Chronic Systolic Heart Failure (ACC/AHA Stage D) due to NICMP s/p LVAD 6/12/17, GIB s/p Cautery (7/25/2017), known AV Malformations, Chronic Anemia due to numerous GIBs (off AC), A-Fib, VT s/p AICD. Pt admitted due to elevated LDH level of 646 (previously 466) for further evaluation and observation.   2/2 VSS ; INR 1.3- continue heparin and coumadin   meds as per CHF team  2/3 - monitor LDH twice a day; continue heparin and coumadin - INR 1.5- coumadin 3 mg today; continue meds as per CHF team   renal sono to evaluate left renal cyst   2/4 RAMP echo, no speed adjustments made  2/5 . Transplant evaluation in progress  2/6  Integrilin initiated, hydralazine 10mg tid  2/7 , INR 2.17, continue Heparin drip per HF. Increase integrilin drip to 1mcg, hydralazine to 25q8 and lasix daily.  2/8 QIN090 INR 2.18  c/w heparin gtt intergrilin gTT 1 mcg  D/C lasix and  aldactone and Hold  coumadin tonight  repeat CBC stat  for 9.0/27.5 down from 11.2/34.7  2/9 Near syncope transferred to SDU PRBCx2 given for HCT 21.4 calcium gluconate D50 and Bicarb transferred to 2CTU in  ARF  2/10 leukocytosis improving on cefepime no source of infection  PRBC x2 coumadin on hold   2/11 intubated  bleeding  scan showing active duodenal bleeding. s/p enteroscopy  with APC for 4 AVM Now PRBC up to PRBC x6  2/12 low dose vasopressin  2/13 stable but anemic  2/14 stable no evidence of infection  2/15 transferred to [address] [place] [address] 2 BM today 7.9/23.6 /   2/16 VVS; H&H stable -->D/C Ferrous sulfate; Continue with IV Iron and current medication regimen; Potassium supplement 2/2 18 beats WCT and K+ level 3.8   2/17 Continue with current medication regimen; Monitor LDH   2/18/18 - coumadin on hold d/t gib- no complaints-d/c planning  2/19/18 - low dose heparin gtt as per DR. Stahl - maintain PTT 40-60. Coumadin remains on hold  LDH down to 616 this am - will check LDH bid.

## 2018-02-19 NOTE — PROGRESS NOTE ADULT - PROBLEM SELECTOR PLAN 1
Continue with Hydralazine 25 mg PO TID   Continue with mexiletine 150 mg PO BID  low dose heparin gtt initiated last evening d/t   rpt.  this am  will check LDH bid

## 2018-02-19 NOTE — PROGRESS NOTE ADULT - PROBLEM SELECTOR PLAN 1
His LDH is slightly improved today. 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 He has no power spikes or other evidence of VAD malfunction.   To minimize risk of progression and to balance with risk of recurrent bleeding, we will continue heparin with a goal aPTT of 40-60.

## 2018-02-19 NOTE — PROGRESS NOTE ADULT - ASSESSMENT
Mr. 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 He was admitted for probable pump thrombosis characterized by elevated LDH in the setting of being off anticoagulation for several months. 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 He was treated with APC of an AVM in the duodenum on 2/11 and required blood transfusions. His intial leukocytosis resolved without specific therapy.    Currently he is stable, with improving anemia following IV iron infusion. His leukocytosis has improved, but his LDH has again increased. He was started on low dose heparin yesterday evening.     He is transplantable and is currently listed status 1A.    Please call me with questions at [phone]. Plan discussed with CTU team and Dr. Parker.

## 2018-02-19 NOTE — PROGRESS NOTE ADULT - SUBJECTIVE AND OBJECTIVE BOX
VITAL SIGNS-Tele: SR 1st deg.     Vital Signs Last 24 Hrs  T(C): 36.4 (02-19-18 @ 05:47), Max: 36.7 (02-18-18 @ 14:42)  HR: 98 (02-19-18 @ 05:47) (76 - 98)  BP: 110/84 (02-19-18 @ 05:47) (95/67 - 126/84)  SpO2: 98% (02-19-18 @ 05:47) (96% - 99%)         02-18 @ 07:01  -  02-19 @ 07:00  --------------------------------------------------------  IN: 1152 mL / OUT: 1600 mL / NET: -448 mL    Daily     Daily     Coumadin    [ ] YES          [ x ]      NO         Reason:     on hold for GIB hx x 2  PHYSICAL EXAM:  Neurology: alert and oriented x 3, nonfocal, no gross deficits  CV : +LVAD hum  Sternal Wound :  CDI , Stable  Lungs: CTA  Abdomen: soft, nontender, nondistended, positive bowel sounds, last bowel movement         Extremities:     no edema,  no calf tenderness     acetaminophen   Tablet 650 milliGRAM(s) Oral every 6 hours PRN  ascorbic acid 500 milliGRAM(s) Oral two times a day  docusate sodium 100 milliGRAM(s) Oral three times a day  heparin  Infusion 700 Unit(s)/Hr IV Continuous <Continuous>  hydrALAZINE 25 milliGRAM(s) Oral every 8 hours  hydrocortisone 2.5% Cream 1 Application(s) Topical two times a day  mexiletine 150 milliGRAM(s) Oral two times a day  pantoprazole  Injectable 40 milliGRAM(s) IV Push daily  polyethylene glycol 3350 17 Gram(s) Oral daily  QUEtiapine 50 milliGRAM(s) Oral at bedtime  senna 2 Tablet(s) Oral at bedtime  sodium chloride 0.9% lock flush 3 milliLiter(s) IV Push every 8 hours    Physical Therapy Rec:   Home  [  ]   Home w/ PT  [  ]  Rehab  [  ]  Discussed with Cardiothoracic Team at AM rounds.

## 2018-02-20 LAB
ADJUSTED MITOGEN: 5.08 IU/ML
ADJUSTED TB AG: -0.02 IU/ML
ANION GAP SERPL CALC-SCNC: 11 MMOL/L — SIGNIFICANT CHANGE UP (ref 5–17)
APTT BLD: 41.7 SEC — HIGH (ref 27.5–37.4)
BUN SERPL-MCNC: 15 MG/DL — SIGNIFICANT CHANGE UP (ref 7–23)
CALCIUM SERPL-MCNC: 8.7 MG/DL — SIGNIFICANT CHANGE UP (ref 8.4–10.5)
CHLORIDE SERPL-SCNC: 105 MMOL/L — SIGNIFICANT CHANGE UP (ref 96–108)
CO2 SERPL-SCNC: 24 MMOL/L — SIGNIFICANT CHANGE UP (ref 22–31)
CREAT SERPL-MCNC: 1.31 MG/DL — HIGH (ref 0.5–1.3)
CULTURE RESULTS: SIGNIFICANT CHANGE UP
CULTURE RESULTS: SIGNIFICANT CHANGE UP
GLUCOSE SERPL-MCNC: 104 MG/DL — HIGH (ref 70–99)
HBV DNA # SERPL NAA+PROBE: NOT DETECTED
HCT VFR BLD CALC: 25.9 % — LOW (ref 39–50)
HEPB DNA PCR LOG: NOT DETECTED LOGIU/ML
HGB BLD-MCNC: 8.3 G/DL — LOW (ref 13–17)
INR BLD: 1.08 RATIO — SIGNIFICANT CHANGE UP (ref 0.88–1.16)
LDH SERPL L TO P-CCNC: 658 U/L — HIGH (ref 50–242)
LDH SERPL L TO P-CCNC: 706 U/L — HIGH (ref 50–242)
M TB IFN-G BLD-IMP: NEGATIVE
MAGNESIUM SERPL-MCNC: 2.3 MG/DL — SIGNIFICANT CHANGE UP (ref 1.6–2.6)
MCHC RBC-ENTMCNC: 32.1 GM/DL — SIGNIFICANT CHANGE UP (ref 32–36)
MCHC RBC-ENTMCNC: 32.7 PG — SIGNIFICANT CHANGE UP (ref 27–34)
MCV RBC AUTO: 102 FL — HIGH (ref 80–100)
PLATELET # BLD AUTO: 345 K/UL — SIGNIFICANT CHANGE UP (ref 150–400)
POTASSIUM SERPL-MCNC: 4.4 MMOL/L — SIGNIFICANT CHANGE UP (ref 3.5–5.3)
POTASSIUM SERPL-SCNC: 4.4 MMOL/L — SIGNIFICANT CHANGE UP (ref 3.5–5.3)
PROTHROM AB SERPL-ACNC: 11.7 SEC — SIGNIFICANT CHANGE UP (ref 9.8–12.7)
QUANTIFERON GOLD NIL: 0.06 IU/ML
RBC # BLD: 2.55 M/UL — LOW (ref 4.2–5.8)
RBC # FLD: 19.3 % — HIGH (ref 10.3–14.5)
SODIUM SERPL-SCNC: 140 MMOL/L — SIGNIFICANT CHANGE UP (ref 135–145)
SPECIMEN SOURCE: SIGNIFICANT CHANGE UP
SPECIMEN SOURCE: SIGNIFICANT CHANGE UP
VZV IGM SER IF-ACNC: <0.91 INDEX
WBC # BLD: 15.5 K/UL — HIGH (ref 3.8–10.5)
WBC # FLD AUTO: 15.5 K/UL — HIGH (ref 3.8–10.5)

## 2018-02-20 PROCEDURE — 99233 SBSQ HOSP IP/OBS HIGH 50: CPT | Mod: 25,GC

## 2018-02-20 PROCEDURE — 99232 SBSQ HOSP IP/OBS MODERATE 35: CPT

## 2018-02-20 PROCEDURE — 90834 PSYTX W PT 45 MINUTES: CPT

## 2018-02-20 PROCEDURE — 93750 INTERROGATION VAD IN PERSON: CPT

## 2018-02-20 RX ORDER — METOPROLOL TARTRATE 50 MG
25 TABLET ORAL DAILY
Qty: 0 | Refills: 0 | Status: DISCONTINUED | OUTPATIENT
Start: 2018-02-20 | End: 2018-02-23

## 2018-02-20 RX ORDER — METOPROLOL TARTRATE 50 MG
25 TABLET ORAL DAILY
Qty: 0 | Refills: 0 | Status: DISCONTINUED | OUTPATIENT
Start: 2018-02-20 | End: 2018-02-20

## 2018-02-20 RX ORDER — ISOSORBIDE DINITRATE 5 MG/1
10 TABLET ORAL THREE TIMES A DAY
Qty: 0 | Refills: 0 | Status: DISCONTINUED | OUTPATIENT
Start: 2018-02-20 | End: 2018-02-23

## 2018-02-20 RX ADMIN — HEPARIN SODIUM 8 UNIT(S)/HR: 5000 INJECTION INTRAVENOUS; SUBCUTANEOUS at 05:51

## 2018-02-20 RX ADMIN — PANTOPRAZOLE SODIUM 40 MILLIGRAM(S): 20 TABLET, DELAYED RELEASE ORAL at 13:21

## 2018-02-20 RX ADMIN — Medication 100 MILLIGRAM(S): at 13:21

## 2018-02-20 RX ADMIN — ISOSORBIDE DINITRATE 10 MILLIGRAM(S): 5 TABLET ORAL at 21:35

## 2018-02-20 RX ADMIN — Medication 25 MILLIGRAM(S): at 05:11

## 2018-02-20 RX ADMIN — Medication 650 MILLIGRAM(S): at 22:07

## 2018-02-20 RX ADMIN — Medication 25 MILLIGRAM(S): at 19:28

## 2018-02-20 RX ADMIN — Medication 25 MILLIGRAM(S): at 21:35

## 2018-02-20 RX ADMIN — MEXILETINE HYDROCHLORIDE 150 MILLIGRAM(S): 150 CAPSULE ORAL at 17:24

## 2018-02-20 RX ADMIN — HEPARIN SODIUM 8 UNIT(S)/HR: 5000 INJECTION INTRAVENOUS; SUBCUTANEOUS at 00:05

## 2018-02-20 RX ADMIN — MEXILETINE HYDROCHLORIDE 150 MILLIGRAM(S): 150 CAPSULE ORAL at 05:11

## 2018-02-20 RX ADMIN — Medication 1 APPLICATION(S): at 17:24

## 2018-02-20 RX ADMIN — SODIUM CHLORIDE 3 MILLILITER(S): 9 INJECTION INTRAMUSCULAR; INTRAVENOUS; SUBCUTANEOUS at 05:05

## 2018-02-20 RX ADMIN — Medication 500 MILLIGRAM(S): at 05:11

## 2018-02-20 RX ADMIN — Medication 25 MILLIGRAM(S): at 13:21

## 2018-02-20 RX ADMIN — Medication 100 MILLIGRAM(S): at 05:12

## 2018-02-20 RX ADMIN — SODIUM CHLORIDE 3 MILLILITER(S): 9 INJECTION INTRAMUSCULAR; INTRAVENOUS; SUBCUTANEOUS at 21:33

## 2018-02-20 RX ADMIN — SODIUM CHLORIDE 3 MILLILITER(S): 9 INJECTION INTRAMUSCULAR; INTRAVENOUS; SUBCUTANEOUS at 13:20

## 2018-02-20 RX ADMIN — Medication 650 MILLIGRAM(S): at 13:30

## 2018-02-20 RX ADMIN — Medication 1 APPLICATION(S): at 05:12

## 2018-02-20 RX ADMIN — Medication 100 MILLIGRAM(S): at 21:35

## 2018-02-20 RX ADMIN — Medication 500 MILLIGRAM(S): at 17:24

## 2018-02-20 RX ADMIN — QUETIAPINE FUMARATE 50 MILLIGRAM(S): 200 TABLET, FILM COATED ORAL at 21:35

## 2018-02-20 NOTE — PROGRESS NOTE ADULT - ASSESSMENT
Feeling good today, mood upbeat.  Sleeping well.  Appetite good.  Able to teach back aspects of heart transplant education.  Understands need for lifelong immunosuppressant medication; has a strong and reliable support system to assist with medication.  Reviewed other aspects of HT education (close follow-up with team, dietary limitations).  Does well with frequent review of information followed by teach-back.  Brother Nawaf visits frequently and close friend Sharon calls every day.  Receptive to support and validation.     DX:  Systolic heart failure; r/o Adjustment disorder     Recommendations:   Benefits from ongoing education with teach back of heart transplant education   Behavioral Cardiology will follow as needed

## 2018-02-20 NOTE — PROGRESS NOTE ADULT - ASSESSMENT
Mr. 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 He was admitted for probable pump thrombosis characterized by elevated LDH in the setting of being off anticoagulation for several months. 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 He was treated with APC of an AVM in the duodenum on 2/11 and required blood transfusions. His intial leukocytosis resolved without specific therapy.    Currently he is stable. MAPs 80s. Worsening leukocytosis now 15.  from 687 on hep gtt ptt 40s. h/h appears stable. Cr 1.3 mild elev. He is transplantable and is currently listed status 1A.

## 2018-02-20 NOTE — PROGRESS NOTE ADULT - ATTENDING COMMENTS
Overall, looks very well. Listed UNOS status 1A, blood type B, PRA 0% waiting for heart transplant.  Heparin resumed over weekend for rising LDH, now plateaued at 658. No evidence of bleeding.  PE unremarkable. Labs and studies reviewed.    Mildly hypertensive so would add Isordil 10 mg po TID  Had WCT yesterday off Amiodarone (risk of primary graft failure) so would add Toprol XL 25 mg po now.

## 2018-02-20 NOTE — PROGRESS NOTE ADULT - ASSESSMENT
Imp:    Leukocytosis but clinically well and no evidence of a new infection     Would continue to observe off anti-microbials    No contra-indications to a heart transplant from an ID perspective at this point      Gary Lujan MD  [phone]  After 5pm/weekends [phone]

## 2018-02-20 NOTE — PROGRESS NOTE ADULT - PROBLEM SELECTOR PLAN 2
Currently not requiring diuretics. He is on low dose hydralazine. Currently not requiring diuretics. He is on low dose hydralazine. Add isordil 10mg tid and metop succ 25mg bedtime.

## 2018-02-20 NOTE — PROGRESS NOTE ADULT - SUBJECTIVE AND OBJECTIVE BOX
INFECTIOUS DISEASES FOLLOW UP-- Sujey Lujan  [phone]    This is a follow up note for this  67yMale with  Heart replaced by heart assist device. ID following for leukocytosis post LVAD and pre-transplant evaluation  No new complaints, feels well      ROS:  CONSTITUTIONAL:  No fever, good appetite  CARDIOVASCULAR:  No chest pain or palpitations  RESPIRATORY:  No dyspnea  GASTROINTESTINAL:  No nausea, vomiting, diarrhea, or abdominal pain  GENITOURINARY:  No dysuria  NEUROLOGIC:  No headache,     Allergies    No Known Allergies    Intolerances        ANTIBIOTICS/RELEVANT:  antimicrobials    immunologic:    OTHER:  acetaminophen   Tablet 650 milliGRAM(s) Oral every 6 hours PRN  ascorbic acid 500 milliGRAM(s) Oral two times a day  docusate sodium 100 milliGRAM(s) Oral three times a day  heparin  Infusion 800 Unit(s)/Hr IV Continuous <Continuous>  hydrALAZINE 25 milliGRAM(s) Oral every 8 hours  hydrocortisone 2.5% Cream 1 Application(s) Topical two times a day  isosorbide   dinitrate Tablet (ISORDIL) 10 milliGRAM(s) Oral three times a day  metoprolol succinate ER 25 milliGRAM(s) Oral daily  mexiletine 150 milliGRAM(s) Oral two times a day  pantoprazole  Injectable 40 milliGRAM(s) IV Push daily  polyethylene glycol 3350 17 Gram(s) Oral daily  QUEtiapine 50 milliGRAM(s) Oral at bedtime  senna 2 Tablet(s) Oral at bedtime  sodium chloride 0.9% lock flush 3 milliLiter(s) IV Push every 8 hours      Objective:  Vital Signs Last 24 Hrs  T(C): 36.7 (20 Feb 2018 17:17), Max: 36.8 (19 Feb 2018 23:20)  T(F): 98.1 (20 Feb 2018 17:17), Max: 98.3 (19 Feb 2018 23:20)  HR: 90 (20 Feb 2018 17:17) (90 - 97)  BP: 101/75 (20 Feb 2018 17:17) (101/75 - 122/66)  BP(mean): 84 (20 Feb 2018 17:17) (84 - 92)  RR: 18 (20 Feb 2018 17:17) (16 - 18)  SpO2: 100% (20 Feb 2018 17:17) (96% - 100%)    PHYSICAL EXAM:  Constitutional:no acute distress  Eyes:WALT, EOMI  Ear/Nose/Throat: no oral lesions, 	  Respiratory: clear BL  Cardiovascular: LVAD sounds  exit site dressing dry and intact  Gastrointestinal:soft, (+) BS, no tenderness  Extremities:no e/e/c, arthritis in right knee  No Lymphadenopathy  IV sites not inflammed.    LABS:                        8.3    15.5  )-----------( 345      ( 20 Feb 2018 05:24 )             25.9     02-20    140  |  105  |  15  ----------------------------<  104<H>  4.4   |  24  |  1.31<H>    Ca    8.7      20 Feb 2018 05:24  Mg     2.3     02-20      PT/INR - ( 20 Feb 2018 05:24 )   PT: 11.7 sec;   INR: 1.08 ratio         PTT - ( 20 Feb 2018 05:24 )  PTT:41.7 sec      MICROBIOLOGY:            RECENT CULTURES:  02-15 @ 13:22  .Blood Blood-Peripheral  --  --  --    No growth at 5 days.  --  02-15 @ 11:10  .Blood Blood-Peripheral  --  --  --    No growth at 5 days.  --      RADIOLOGY & ADDITIONAL STUDIES:    < from: Xray Chest 1 View- PORTABLE-Routine (02.16.18 @ 06:16) >  IMPRESSION:   Clear lungs.    < end of copied text >

## 2018-02-20 NOTE — PROGRESS NOTE ADULT - SUBJECTIVE AND OBJECTIVE BOX
24H hour events: No acute events.    MEDICATIONS:  heparin  Infusion 800 Unit(s)/Hr IV Continuous <Continuous>  hydrALAZINE 25 milliGRAM(s) Oral every 8 hours  mexiletine 150 milliGRAM(s) Oral two times a day  acetaminophen   Tablet 650 milliGRAM(s) Oral every 6 hours PRN  QUEtiapine 50 milliGRAM(s) Oral at bedtime  docusate sodium 100 milliGRAM(s) Oral three times a day  pantoprazole  Injectable 40 milliGRAM(s) IV Push daily  polyethylene glycol 3350 17 Gram(s) Oral daily  senna 2 Tablet(s) Oral at bedtime  ascorbic acid 500 milliGRAM(s) Oral two times a day  hydrocortisone 2.5% Cream 1 Application(s) Topical two times a day    REVIEW OF SYSTEMS:  Complete 10point ROS negative. Feels well. Ambulating. Denies cp, sob, orthopnea, pnd, palpitations, lh/dizziness. BM normal color.    PHYSICAL EXAM:  T(C): 36.7 (02-20-18 @ 17:17), Max: 36.8 (02-19-18 @ 17:39)  HR: 90 (02-20-18 @ 17:17) (90 - 97)  BP: 101/75 (02-20-18 @ 17:17) (101/75 - 122/66)  RR: 18 (02-20-18 @ 17:17) (16 - 18)  SpO2: 100% (02-20-18 @ 17:17) (96% - 100%)  Wt(kg): --  I&O's Summary    19 Feb 2018 07:01  -  20 Feb 2018 07:00  --------------------------------------------------------  IN: 1412 mL / OUT: 1675 mL / NET: -263 mL    20 Feb 2018 07:01  -  20 Feb 2018 17:33  --------------------------------------------------------  IN: 688 mL / OUT: 900 mL / NET: -212 mL      General: No distress. Comfortable.  HEENT: EOM intact.  Neck: Neck supple. JVD not elevated.  Chest: Clear to auscultation bilaterally  CV: LVAD whine. No distal pulses. No edema.   Abdomen: Soft, non-distended, non-tender  Driveline exit site: Clean, dry, and intact    LABS:	 	    CBC Full  -  ( 20 Feb 2018 05:24 )  WBC Count : 15.5 K/uL  Hemoglobin : 8.3 g/dL  Hematocrit : 25.9 %  Platelet Count - Automated : 345 K/uL  Mean Cell Volume : 102.0 fl  Mean Cell Hemoglobin : 32.7 pg  Mean Cell Hemoglobin Concentration : 32.1 gm/dL  Auto Neutrophil # : x  Auto Lymphocyte # : x  Auto Monocyte # : x  Auto Eosinophil # : x  Auto Basophil # : x  Auto Neutrophil % : x  Auto Lymphocyte % : x  Auto Monocyte % : x  Auto Eosinophil % : x  Auto Basophil % : x    02-20    140  |  105  |  15  ----------------------------<  104<H>  4.4   |  24  |  1.31<H>  02-19    139  |  104  |  14  ----------------------------<  94  4.3   |  24  |  1.27    Ca    8.7      20 Feb 2018 05:24  Ca    8.5      19 Feb 2018 06:51  Mg     2.3     02-20  Mg     1.9     02-18    TELEMETRY: sinus     	  ASSESSMENT/PLAN: 24H hour events: No acute events.    MEDICATIONS:  heparin  Infusion 800 Unit(s)/Hr IV Continuous <Continuous>  hydrALAZINE 25 milliGRAM(s) Oral every 8 hours  mexiletine 150 milliGRAM(s) Oral two times a day  acetaminophen   Tablet 650 milliGRAM(s) Oral every 6 hours PRN  QUEtiapine 50 milliGRAM(s) Oral at bedtime  docusate sodium 100 milliGRAM(s) Oral three times a day  pantoprazole  Injectable 40 milliGRAM(s) IV Push daily  polyethylene glycol 3350 17 Gram(s) Oral daily  senna 2 Tablet(s) Oral at bedtime  ascorbic acid 500 milliGRAM(s) Oral two times a day  hydrocortisone 2.5% Cream 1 Application(s) Topical two times a day    REVIEW OF SYSTEMS:  Complete 10point ROS negative. Feels well. Ambulating. Denies cp, sob, orthopnea, pnd, palpitations, lh/dizziness. BM normal color.    PHYSICAL EXAM:  T(C): 36.7 (02-20-18 @ 17:17), Max: 36.8 (02-19-18 @ 17:39)  HR: 90 (02-20-18 @ 17:17) (90 - 97)  BP: 101/75 (02-20-18 @ 17:17) (101/75 - 122/66)  RR: 18 (02-20-18 @ 17:17) (16 - 18)  SpO2: 100% (02-20-18 @ 17:17) (96% - 100%)  Wt(kg): --  I&O's Summary    19 Feb 2018 07:01  -  20 Feb 2018 07:00  --------------------------------------------------------  IN: 1412 mL / OUT: 1675 mL / NET: -263 mL    20 Feb 2018 07:01  -  20 Feb 2018 17:33  --------------------------------------------------------  IN: 688 mL / OUT: 900 mL / NET: -212 mL      General: No distress. Comfortable.  HEENT: EOM intact.  Neck: Neck supple. JVD not elevated.  Chest: Clear to auscultation bilaterally  CV: LVAD whine. No distal pulses. No edema.   Abdomen: Soft, non-distended, non-tender  Driveline exit site: Clean, dry, and intact    LABS:	 	    CBC Full  -  ( 20 Feb 2018 05:24 )  WBC Count : 15.5 K/uL  Hemoglobin : 8.3 g/dL  Hematocrit : 25.9 %  Platelet Count - Automated : 345 K/uL  Mean Cell Volume : 102.0 fl  Mean Cell Hemoglobin : 32.7 pg  Mean Cell Hemoglobin Concentration : 32.1 gm/dL  Auto Neutrophil # : x  Auto Lymphocyte # : x  Auto Monocyte # : x  Auto Eosinophil # : x  Auto Basophil # : x  Auto Neutrophil % : x  Auto Lymphocyte % : x  Auto Monocyte % : x  Auto Eosinophil % : x  Auto Basophil % : x    02-20    140  |  105  |  15  ----------------------------<  104<H>  4.4   |  24  |  1.31<H>  02-19    139  |  104  |  14  ----------------------------<  94  4.3   |  24  |  1.27    Ca    8.7      20 Feb 2018 05:24  Ca    8.5      19 Feb 2018 06:51  Mg     2.3     02-20  Mg     1.9     02-18    TELEMETRY: sinus     LVAD Interrogation: Heart Mate II  RPMs: 9200  Power: 5.8  Flow: 5.4  PI: 6.2  Event:  None  No programming changes were made	    ASSESSMENT/PLAN:

## 2018-02-20 NOTE — CHART NOTE - NSCHARTNOTEFT_GEN_A_CORE
Source: Patient [ x ]    Family [ ]     other [ x ] Comprehensive chart review     Pt seen for nutrition follow up, sitting in chair at bedside. Reports good appetite and eating 100% of meals. Pt states he is typically drinking about 1 Ensure Enlive daily as he is feeling full from eating meals. Denies GI distress with last BM yesterday. Reports no difficulty chewing/swallowing. Pt able to teach-back nutrition therapy for CHF/heart health and Coumadin/Vitamin K interaction, however, did need prompting. Pt receptive to review/reinforcement and verbalized understanding of relationship between diet and health/disease. Pt states he will make all necessary changes to diet.     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    Diet : regular, low sodium, Ensure Enlive 2 cans daily    Admission Weight: 78.9kg  Current Weight: 71.7kg  Weight fluctuations noted, likely due to fluid shifts. No edema noted at this time.     Pertinent Medications: MEDICATIONS  (STANDING):  ascorbic acid 500 milliGRAM(s) Oral two times a day  docusate sodium 100 milliGRAM(s) Oral three times a day  heparin  Infusion 800 Unit(s)/Hr (8 mL/Hr) IV Continuous <Continuous>  hydrALAZINE 25 milliGRAM(s) Oral every 8 hours  hydrocortisone 2.5% Cream 1 Application(s) Topical two times a day  mexiletine 150 milliGRAM(s) Oral two times a day  pantoprazole  Injectable 40 milliGRAM(s) IV Push daily  polyethylene glycol 3350 17 Gram(s) Oral daily  QUEtiapine 50 milliGRAM(s) Oral at bedtime  senna 2 Tablet(s) Oral at bedtime  sodium chloride 0.9% lock flush 3 milliLiter(s) IV Push every 8 hours    MEDICATIONS  (PRN):  acetaminophen   Tablet 650 milliGRAM(s) Oral every 6 hours PRN For Temp over 38.3 C (100.94 F)    Pertinent Labs:    Complete Blood Count (02.20.18 @ 05:24)    Hemoglobin: 8.3 g/dL - low    Hematocrit: 25.9 % - low  Lactate Dehydrogenase, Serum (02.20.18 @ 05:24)    Lactate Dehydrogenase, Serum: 658 U/L - high  Basic Metabolic Panel - STAT (02.20.18 @ 05:24)    Sodium, Serum: 140 mmol/L    Potassium, Serum: 4.4 mmol/L    Chloride, Serum: 105 mmol/L    Carbon Dioxide, Serum: 24 mmol/L    Anion Gap, Serum: 11 mmol/L    Blood Urea Nitrogen, Serum: 15 mg/dL    Creatinine, Serum: 1.31 mg/dL - high    Glucose, Serum: 104 mg/dL - high    Calcium, Total Serum: 8.7 mg/dL  Hepatic Function Panel in AM (02.17.18 @ 08:45)    Indirect Reacting Bilirubin: 0.3 mg/dL    Protein Total, Serum: 6.1 g/dL    Albumin, Serum: 3.1 g/dL - low    Bilirubin Total, Serum: 0.5 mg/dL    Bilirubin Direct, Serum: 0.2 mg/dL    Alkaline Phosphatase, Serum: 67 U/L    Aspartate Aminotransferase (AST/SGOT): 39 U/L    Alanine Aminotransferase (ALT/SGPT): 22 U/L      Skin: no pressure ulcers noted.    Estimated Needs:   [ x ] no change since previous assessment  [ ] recalculated:       Previous Nutrition Diagnosis:   Limited adherence to nutrition related recommendations improving.  Inadequate oral intake now resolved as pt with good PO intake of meals/supplements.        New Nutrition Diagnosis: [ x ] not applicable    Interventions:   1. Encourage pt to maintain good PO intake of meals/supplements.  2. Recommend decreasing Ensure Enlive to 1 can/day.  3. Provide food preferences within therapeutic diet when requested.   4. Reviewed alternate menu options, encourage use as needed.  5. Reviewed/reinforced nutrition therapy for CHF, heart health, and Coumadin/Vitamin K interaction.    D/w team.        Monitoring and Evaluation:     [ x ] PO intake [ x ] Tolerance to diet prescription [ x ] weights [ x ] follow up per protocol    [ ] other:

## 2018-02-21 LAB
ANION GAP SERPL CALC-SCNC: 10 MMOL/L — SIGNIFICANT CHANGE UP (ref 5–17)
APTT BLD: 42.4 SEC — HIGH (ref 27.5–37.4)
BUN SERPL-MCNC: 15 MG/DL — SIGNIFICANT CHANGE UP (ref 7–23)
CALCIUM SERPL-MCNC: 8.7 MG/DL — SIGNIFICANT CHANGE UP (ref 8.4–10.5)
CHLORIDE SERPL-SCNC: 105 MMOL/L — SIGNIFICANT CHANGE UP (ref 96–108)
CO2 SERPL-SCNC: 24 MMOL/L — SIGNIFICANT CHANGE UP (ref 22–31)
CREAT SERPL-MCNC: 1.15 MG/DL — SIGNIFICANT CHANGE UP (ref 0.5–1.3)
GLUCOSE SERPL-MCNC: 99 MG/DL — SIGNIFICANT CHANGE UP (ref 70–99)
HCT VFR BLD CALC: 26 % — LOW (ref 39–50)
HGB BLD-MCNC: 8 G/DL — LOW (ref 13–17)
INR BLD: 1.1 RATIO — SIGNIFICANT CHANGE UP (ref 0.88–1.16)
LDH SERPL L TO P-CCNC: 647 U/L — HIGH (ref 50–242)
LDH SERPL L TO P-CCNC: 702 U/L — HIGH (ref 50–242)
MAGNESIUM SERPL-MCNC: 2.2 MG/DL — SIGNIFICANT CHANGE UP (ref 1.6–2.6)
MCHC RBC-ENTMCNC: 30.7 GM/DL — LOW (ref 32–36)
MCHC RBC-ENTMCNC: 31.4 PG — SIGNIFICANT CHANGE UP (ref 27–34)
MCV RBC AUTO: 102 FL — HIGH (ref 80–100)
PLATELET # BLD AUTO: 335 K/UL — SIGNIFICANT CHANGE UP (ref 150–400)
POTASSIUM SERPL-MCNC: 5 MMOL/L — SIGNIFICANT CHANGE UP (ref 3.5–5.3)
POTASSIUM SERPL-SCNC: 5 MMOL/L — SIGNIFICANT CHANGE UP (ref 3.5–5.3)
PROTHROM AB SERPL-ACNC: 12 SEC — SIGNIFICANT CHANGE UP (ref 9.8–12.7)
RBC # BLD: 2.55 M/UL — LOW (ref 4.2–5.8)
RBC # FLD: 18.8 % — HIGH (ref 10.3–14.5)
SODIUM SERPL-SCNC: 139 MMOL/L — SIGNIFICANT CHANGE UP (ref 135–145)
WBC # BLD: 15.7 K/UL — HIGH (ref 3.8–10.5)
WBC # FLD AUTO: 15.7 K/UL — HIGH (ref 3.8–10.5)

## 2018-02-21 PROCEDURE — 99233 SBSQ HOSP IP/OBS HIGH 50: CPT | Mod: 25

## 2018-02-21 PROCEDURE — 93750 INTERROGATION VAD IN PERSON: CPT

## 2018-02-21 RX ADMIN — Medication 25 MILLIGRAM(S): at 05:23

## 2018-02-21 RX ADMIN — Medication 500 MILLIGRAM(S): at 05:24

## 2018-02-21 RX ADMIN — Medication 25 MILLIGRAM(S): at 14:02

## 2018-02-21 RX ADMIN — Medication 500 MILLIGRAM(S): at 18:54

## 2018-02-21 RX ADMIN — ISOSORBIDE DINITRATE 10 MILLIGRAM(S): 5 TABLET ORAL at 05:23

## 2018-02-21 RX ADMIN — ISOSORBIDE DINITRATE 10 MILLIGRAM(S): 5 TABLET ORAL at 14:02

## 2018-02-21 RX ADMIN — QUETIAPINE FUMARATE 50 MILLIGRAM(S): 200 TABLET, FILM COATED ORAL at 21:55

## 2018-02-21 RX ADMIN — Medication 1 APPLICATION(S): at 05:27

## 2018-02-21 RX ADMIN — PANTOPRAZOLE SODIUM 40 MILLIGRAM(S): 20 TABLET, DELAYED RELEASE ORAL at 14:01

## 2018-02-21 RX ADMIN — HEPARIN SODIUM 8 UNIT(S)/HR: 5000 INJECTION INTRAVENOUS; SUBCUTANEOUS at 07:02

## 2018-02-21 RX ADMIN — Medication 25 MILLIGRAM(S): at 21:55

## 2018-02-21 RX ADMIN — SODIUM CHLORIDE 3 MILLILITER(S): 9 INJECTION INTRAMUSCULAR; INTRAVENOUS; SUBCUTANEOUS at 05:12

## 2018-02-21 RX ADMIN — MEXILETINE HYDROCHLORIDE 150 MILLIGRAM(S): 150 CAPSULE ORAL at 18:54

## 2018-02-21 RX ADMIN — Medication 650 MILLIGRAM(S): at 05:24

## 2018-02-21 RX ADMIN — MEXILETINE HYDROCHLORIDE 150 MILLIGRAM(S): 150 CAPSULE ORAL at 05:23

## 2018-02-21 RX ADMIN — Medication 100 MILLIGRAM(S): at 21:55

## 2018-02-21 RX ADMIN — SODIUM CHLORIDE 3 MILLILITER(S): 9 INJECTION INTRAMUSCULAR; INTRAVENOUS; SUBCUTANEOUS at 21:51

## 2018-02-21 RX ADMIN — ISOSORBIDE DINITRATE 10 MILLIGRAM(S): 5 TABLET ORAL at 21:55

## 2018-02-21 RX ADMIN — Medication 1 APPLICATION(S): at 18:54

## 2018-02-21 RX ADMIN — SODIUM CHLORIDE 3 MILLILITER(S): 9 INJECTION INTRAMUSCULAR; INTRAVENOUS; SUBCUTANEOUS at 13:41

## 2018-02-21 NOTE — PROGRESS NOTE ADULT - SUBJECTIVE AND OBJECTIVE BOX
Slept well. No overnight events and no complaints. Specifically denies any signs or symptoms of bleeding.    Medications:  acetaminophen   Tablet 650 milliGRAM(s) Oral every 6 hours PRN  ascorbic acid 500 milliGRAM(s) Oral two times a day  docusate sodium 100 milliGRAM(s) Oral three times a day  heparin  Infusion 800 Unit(s)/Hr IV Continuous <Continuous>  hydrALAZINE 25 milliGRAM(s) Oral every 8 hours  hydrocortisone 2.5% Cream 1 Application(s) Topical two times a day  isosorbide   dinitrate Tablet (ISORDIL) 10 milliGRAM(s) Oral three times a day  metoprolol succinate ER 25 milliGRAM(s) Oral daily  mexiletine 150 milliGRAM(s) Oral two times a day  pantoprazole  Injectable 40 milliGRAM(s) IV Push daily  polyethylene glycol 3350 17 Gram(s) Oral daily  QUEtiapine 50 milliGRAM(s) Oral at bedtime  senna 2 Tablet(s) Oral at bedtime  sodium chloride 0.9% lock flush 3 milliLiter(s) IV Push every 8 hours      Vitals:  T(C): 36.7 (02-21-18 @ 09:15), Max: 36.9 (02-20-18 @ 21:29)  HR: 94 (02-21-18 @ 09:15) (84 - 94)  BP: 100/76 (02-21-18 @ 09:15) (90/64 - 111/80)  BP(mean): 84 (02-21-18 @ 09:15) (73 - 90)  RR: 18 (02-21-18 @ 09:15) (18 - 18)  SpO2: 100% (02-21-18 @ 09:15) (97% - 100%)  Wt(kg): 77.1 kg    Vital Signs Last 24 Hrs  T(C): 36.7 (21 Feb 2018 09:15), Max: 36.9 (20 Feb 2018 21:29)  T(F): 98.1 (21 Feb 2018 09:15), Max: 98.4 (20 Feb 2018 21:29)  HR: 94 (21 Feb 2018 09:15) (84 - 94)  BP: 100/76 (21 Feb 2018 09:15) (90/64 - 111/80)  BP(mean): 84 (21 Feb 2018 09:15) (73 - 90)  RR: 18 (21 Feb 2018 09:15) (18 - 18)  SpO2: 100% (21 Feb 2018 09:15) (97% - 100%)      Daily     I&O's Summary    20 Feb 2018 07:01  -  21 Feb 2018 07:00  --------------------------------------------------------  IN: 1392 mL / OUT: 2750 mL / NET: -1358 mL      Physical Exam:     General: No distress. Comfortable.  Neck: Neck supple. JVP not elevated. No masses  Chest: Clear to auscultation bilaterally  CV: Typical LVAD sounds. No distal pulses  Abdomen: Soft, non-distended, non-tender  Skin: No rashes or skin breakdown, warm and well perfused  Neurology: Alert and oriented times three. Sensation intact  Psych: Affect normal    LVAD Interrogation: Heart Mate II  Speed: 9200 rpms  Power: 4.8-6.3 raymond  Flow: 5.6-6.7 lpm  PI: 4.9-6.3  Events: none.  No programming changes were made    Labs:                        8.0    15.7  )-----------( 335      ( 21 Feb 2018 06:31 )             26.0     02-21    139  |  105  |  15  ----------------------------<  99  5.0   |  24  |  1.15    Ca    8.7      21 Feb 2018 06:31  Mg     2.2     02-21      PT/INR - ( 21 Feb 2018 06:31 )   PT: 12.0 sec;   INR: 1.10 ratio    PTT - ( 21 Feb 2018 06:31 )  PTT:42.4 sec      Lactate Dehydrogenase, Serum: 647 U/L (02-21 @ 06:31)  Lactate Dehydrogenase, Serum: 706 U/L (02-20 @ 17:48)  Lactate Dehydrogenase, Serum: 658 U/L (02-20 @ 05:24)  Lactate Dehydrogenase, Serum: 687 U/L (02-19 @ 17:37)  Lactate Dehydrogenase, Serum: 611 U/L (02-19 @ 06:51)  Lactate Dehydrogenase, Serum: 723 U/L (02-18 @ 16:04)

## 2018-02-21 NOTE — PROGRESS NOTE ADULT - PROBLEM SELECTOR PLAN 5
Will continue to monitor. Discussed with Dr. Lujan of ID. No evidence of infection. Continue to monitor.

## 2018-02-21 NOTE — PROGRESS NOTE ADULT - PROBLEM SELECTOR PLAN 3
Hydralazine 25 mg PO TID   Mexiletine 150 mg PO BID   Transplant Listed for OHT, blood type B, UNOS 1A status. No crossmatch needed (cPRA 0%).    Check daily BMP. LDH supplement k prn.  MAP goal 70 -80  Plan of care discussed with covering attending

## 2018-02-21 NOTE — PROGRESS NOTE ADULT - PROBLEM SELECTOR PLAN 2
Currently not requiring diuretics. He is on low dose hydralazine. Currently not requiring diuretics. He is on low dose hydralazine and isordil to manage blood pressure.

## 2018-02-21 NOTE — PROGRESS NOTE ADULT - ASSESSMENT
Mr. 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 He was admitted for probable pump thrombosis characterized by elevated LDH in the setting of being off anticoagulation for several months. 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 He was treated with APC of an AVM in the duodenum on 2/11 and required 6u blood transfusions. His initial leukocytosis resolved without specific therapy, but his WBC count has been slightly elevated for a few days now without signs or symptoms of infection.    LDH stable on heparin infusion with low aPTT goal.  He is transplantable and is currently listed status 1A.

## 2018-02-21 NOTE — PROGRESS NOTE ADULT - PROBLEM SELECTOR PLAN 1
His LDH is slightly improved today. 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 He has no power spikes or other evidence of VAD malfunction.   To minimize risk of progression and to balance with risk of recurrent bleeding, we will continue heparin with a goal aPTT of 40-60. LDH is stable, but consistent with recurrent pump thrombosis. He has no power spikes or other evidence of VAD malfunction.   To minimize risk of progression and to balance with risk of recurrent bleeding, we will continue heparin with a goal aPTT of 40-60.

## 2018-02-21 NOTE — PROGRESS NOTE ADULT - PROBLEM SELECTOR PLAN 8
Will continue listing to status 1A for now.   Current PRA is 0%. Currently UNOS status 1A for OHT.  Current PRA is 0%.

## 2018-02-21 NOTE — PROGRESS NOTE ADULT - SUBJECTIVE AND OBJECTIVE BOX
VITAL SIGNS    Subjective: "I'm feeling ok; no complaint"     Telemetry: NSR 1st degree  (PVC's/Couplets/ 6 beat WCT)     Vital Signs Last 24 Hrs  T(C): 36.7 (18 @ 09:15), Max: 36.9 (18 @ 21:29)  T(F): 98.1 (18 @ 09:15), Max: 98.4 (18 @ 21:29)  HR: 94 (18 @ 09:15) (84 - 94)  BP: 100/76 (18 @ 09:15) (90/64 - 111/80)  RR: 18 (18 @ 09:15) (18 - 18)  SpO2: 100% (18 @ 09:15) (97% - 100%)            @ 07:01  -   @ 07:00  --------------------------------------------------------  IN: 1392 mL / OUT: 2750 mL / NET: -1358 mL     @ 07:01  -   @ 13:20  --------------------------------------------------------  IN: 480 mL / OUT: 550 mL / NET: -70 mL    Daily     Daily Weight in k.1 (2018 08:00)    PHYSICAL EXAM    Neurology: alert and oriented x 3, nonfocal, no gross deficits    CV: (+) LVAD hum     Sternal Wound:  MSI -->CDI , sternum stable    Lungs: CTA B/L     Abdomen: soft, nontender, nondistended, positive bowel sounds, (+) Flatus; (+) BM; RLQ Driveline site with occlusive dressing C/D/I    :  Voiding               Extremities:  B/L LE (+) edema; negative calf tenderness; (+) 2 DP palpable        acetaminophen   Tablet 650 milliGRAM(s) Oral every 6 hours PRN  ascorbic acid 500 milliGRAM(s) Oral two times a day  docusate sodium 100 milliGRAM(s) Oral three times a day  heparin  Infusion 800 Unit(s)/Hr IV Continuous <Continuous>  hydrALAZINE 25 milliGRAM(s) Oral every 8 hours  hydrocortisone 2.5% Cream 1 Application(s) Topical two times a day  isosorbide   dinitrate Tablet (ISORDIL) 10 milliGRAM(s) Oral three times a day  metoprolol succinate ER 25 milliGRAM(s) Oral daily  mexiletine 150 milliGRAM(s) Oral two times a day  pantoprazole  Injectable 40 milliGRAM(s) IV Push daily  polyethylene glycol 3350 17 Gram(s) Oral daily  QUEtiapine 50 milliGRAM(s) Oral at bedtime  senna 2 Tablet(s) Oral at bedtime      Physical Therapy Rec:   Home  [  ]   Home w/ PT  [ X ]  Rehab  [  ]    Discussed with Cardiothoracic Team at AM rounds.

## 2018-02-21 NOTE — PROGRESS NOTE ADULT - PROBLEM SELECTOR PLAN 1
Continue with Hydralazine 25 mg PO TID   Continue with mexiletine 150 mg PO BID  low dose heparin gtt initiated last evening d/t elevated LDH   Monitor LDH bid

## 2018-02-21 NOTE — PROGRESS NOTE ADULT - ASSESSMENT
67M PMH Chronic Systolic Heart Failure (ACC/AHA Stage D) due to NICMP s/p LVAD 6/12/17, GIB s/p Cautery (7/25/2017), known AV Malformations, Chronic Anemia due to numerous GIBs (off AC), A-Fib, VT s/p AICD. Pt admitted due to elevated LDH level of 646 (previously 466) for further evaluation and observation.   2/2 VSS ; INR 1.3- continue heparin and coumadin   meds as per CHF team  2/3 - monitor LDH twice a day; continue heparin and coumadin - INR 1.5- coumadin 3 mg today; continue meds as per CHF team   renal sono to evaluate left renal cyst   2/4 RAMP echo, no speed adjustments made  2/5 . Transplant evaluation in progress  2/6  Integrilin initiated, hydralazine 10mg tid  2/7 , INR 2.17, continue Heparin drip per HF. Increase integrilin drip to 1mcg, hydralazine to 25q8 and lasix daily.  2/8 [id number] INR 2.18  c/w heparin gtt intergrilin gTT 1 mcg  D/C lasix and  aldactone and Hold  coumadin tonight  repeat CBC stat  for 9.0/27.5 down from 11.2/34.7  2/9 Near syncope transferred to SDU PRBCx2 given for HCT 21.4 calcium gluconate D50 and Bicarb transferred to 2CTU in  ARF  2/10 leukocytosis improving on cefepime no source of infection  PRBC x2 coumadin on hold   2/11 intubated  bleeding  scan showing active duodenal bleeding. s/p enteroscopy  with APC for 4 AVM Now PRBC up to PRBC x6  2/12 low dose vasopressin  2/13 stable but anemic  2/14 stable no evidence of infection  2/15 transferred to [address] [place] [address] 2 BM today 7.9/23.6 /   2/16 VVS; H&H stable -->D/C Ferrous sulfate; Continue with IV Iron and current medication regimen; Potassium supplement 2/2 18 beats WCT and K+ level 3.8   2/17 Continue with current medication regimen; Monitor LDH   2/18/18 - coumadin on hold d/t gib- no complaints-d/c planning  2/19/18 - low dose heparin gtt as per DR. Stahl - maintain PTT 40-60. Coumadin remains on hold  LDH down to 616 this am - will check LDH bid.  2/20 Continue with current medication regimen and AC therapy

## 2018-02-22 LAB
ALBUMIN SERPL ELPH-MCNC: 3.4 G/DL — SIGNIFICANT CHANGE UP (ref 3.3–5)
ALP SERPL-CCNC: 85 U/L — SIGNIFICANT CHANGE UP (ref 40–120)
ALT FLD-CCNC: 30 U/L RC — SIGNIFICANT CHANGE UP (ref 10–45)
ANION GAP SERPL CALC-SCNC: 13 MMOL/L — SIGNIFICANT CHANGE UP (ref 5–17)
ANION GAP SERPL CALC-SCNC: 9 MMOL/L — SIGNIFICANT CHANGE UP (ref 5–17)
APTT BLD: 39.6 SEC — HIGH (ref 27.5–37.4)
APTT BLD: 43.8 SEC — HIGH (ref 27.5–37.4)
AST SERPL-CCNC: 53 U/L — HIGH (ref 10–40)
BASOPHILS # BLD AUTO: 0 K/UL — SIGNIFICANT CHANGE UP (ref 0–0.2)
BASOPHILS NFR BLD AUTO: 0.1 % — SIGNIFICANT CHANGE UP (ref 0–2)
BILIRUB SERPL-MCNC: 0.4 MG/DL — SIGNIFICANT CHANGE UP (ref 0.2–1.2)
BLD GP AB SCN SERPL QL: NEGATIVE — SIGNIFICANT CHANGE UP
BUN SERPL-MCNC: 12 MG/DL — SIGNIFICANT CHANGE UP (ref 7–23)
BUN SERPL-MCNC: 17 MG/DL — SIGNIFICANT CHANGE UP (ref 7–23)
CALCIUM SERPL-MCNC: 8.8 MG/DL — SIGNIFICANT CHANGE UP (ref 8.4–10.5)
CALCIUM SERPL-MCNC: 8.8 MG/DL — SIGNIFICANT CHANGE UP (ref 8.4–10.5)
CHLORIDE SERPL-SCNC: 103 MMOL/L — SIGNIFICANT CHANGE UP (ref 96–108)
CHLORIDE SERPL-SCNC: 105 MMOL/L — SIGNIFICANT CHANGE UP (ref 96–108)
CO2 SERPL-SCNC: 23 MMOL/L — SIGNIFICANT CHANGE UP (ref 22–31)
CO2 SERPL-SCNC: 24 MMOL/L — SIGNIFICANT CHANGE UP (ref 22–31)
CREAT SERPL-MCNC: 1.14 MG/DL — SIGNIFICANT CHANGE UP (ref 0.5–1.3)
CREAT SERPL-MCNC: 1.25 MG/DL — SIGNIFICANT CHANGE UP (ref 0.5–1.3)
EOSINOPHIL # BLD AUTO: 0.4 K/UL — SIGNIFICANT CHANGE UP (ref 0–0.5)
EOSINOPHIL NFR BLD AUTO: 3.3 % — SIGNIFICANT CHANGE UP (ref 0–6)
GLUCOSE SERPL-MCNC: 112 MG/DL — HIGH (ref 70–99)
GLUCOSE SERPL-MCNC: 99 MG/DL — SIGNIFICANT CHANGE UP (ref 70–99)
HCT VFR BLD CALC: 26.7 % — LOW (ref 39–50)
HCT VFR BLD CALC: 27.5 % — LOW (ref 39–50)
HGB BLD-MCNC: 8.5 G/DL — LOW (ref 13–17)
HGB BLD-MCNC: 8.6 G/DL — LOW (ref 13–17)
INR BLD: 1.04 RATIO — SIGNIFICANT CHANGE UP (ref 0.88–1.16)
INR BLD: 1.11 RATIO — SIGNIFICANT CHANGE UP (ref 0.88–1.16)
LDH SERPL L TO P-CCNC: 640 U/L — HIGH (ref 50–242)
LYMPHOCYTES # BLD AUTO: 14.2 % — SIGNIFICANT CHANGE UP (ref 13–44)
LYMPHOCYTES # BLD AUTO: 2 K/UL — SIGNIFICANT CHANGE UP (ref 1–3.3)
MCHC RBC-ENTMCNC: 31.4 GM/DL — LOW (ref 32–36)
MCHC RBC-ENTMCNC: 31.8 PG — SIGNIFICANT CHANGE UP (ref 27–34)
MCHC RBC-ENTMCNC: 31.9 GM/DL — LOW (ref 32–36)
MCHC RBC-ENTMCNC: 32.3 PG — SIGNIFICANT CHANGE UP (ref 27–34)
MCV RBC AUTO: 101 FL — HIGH (ref 80–100)
MCV RBC AUTO: 101 FL — HIGH (ref 80–100)
MONOCYTES # BLD AUTO: 1.5 K/UL — HIGH (ref 0–0.9)
MONOCYTES NFR BLD AUTO: 10.6 % — SIGNIFICANT CHANGE UP (ref 2–14)
NEUTROPHILS # BLD AUTO: 9.8 K/UL — HIGH (ref 1.8–7.4)
NEUTROPHILS NFR BLD AUTO: 71.8 % — SIGNIFICANT CHANGE UP (ref 43–77)
PLATELET # BLD AUTO: 338 K/UL — SIGNIFICANT CHANGE UP (ref 150–400)
PLATELET # BLD AUTO: 341 K/UL — SIGNIFICANT CHANGE UP (ref 150–400)
POTASSIUM SERPL-MCNC: 4.7 MMOL/L — SIGNIFICANT CHANGE UP (ref 3.5–5.3)
POTASSIUM SERPL-MCNC: 4.9 MMOL/L — SIGNIFICANT CHANGE UP (ref 3.5–5.3)
POTASSIUM SERPL-SCNC: 4.7 MMOL/L — SIGNIFICANT CHANGE UP (ref 3.5–5.3)
POTASSIUM SERPL-SCNC: 4.9 MMOL/L — SIGNIFICANT CHANGE UP (ref 3.5–5.3)
PROT SERPL-MCNC: 7.2 G/DL — SIGNIFICANT CHANGE UP (ref 6–8.3)
PROTHROM AB SERPL-ACNC: 11.3 SEC — SIGNIFICANT CHANGE UP (ref 9.8–12.7)
PROTHROM AB SERPL-ACNC: 12 SEC — SIGNIFICANT CHANGE UP (ref 9.8–12.7)
RBC # BLD: 2.63 M/UL — LOW (ref 4.2–5.8)
RBC # BLD: 2.71 M/UL — LOW (ref 4.2–5.8)
RBC # FLD: 18.1 % — HIGH (ref 10.3–14.5)
RBC # FLD: 18.1 % — HIGH (ref 10.3–14.5)
RH IG SCN BLD-IMP: POSITIVE — SIGNIFICANT CHANGE UP
SODIUM SERPL-SCNC: 138 MMOL/L — SIGNIFICANT CHANGE UP (ref 135–145)
SODIUM SERPL-SCNC: 139 MMOL/L — SIGNIFICANT CHANGE UP (ref 135–145)
WBC # BLD: 11.7 K/UL — HIGH (ref 3.8–10.5)
WBC # BLD: 13.7 K/UL — HIGH (ref 3.8–10.5)
WBC # FLD AUTO: 11.7 K/UL — HIGH (ref 3.8–10.5)
WBC # FLD AUTO: 13.7 K/UL — HIGH (ref 3.8–10.5)

## 2018-02-22 PROCEDURE — 93010 ELECTROCARDIOGRAM REPORT: CPT

## 2018-02-22 PROCEDURE — 93750 INTERROGATION VAD IN PERSON: CPT

## 2018-02-22 PROCEDURE — 99233 SBSQ HOSP IP/OBS HIGH 50: CPT | Mod: 25

## 2018-02-22 PROCEDURE — 71045 X-RAY EXAM CHEST 1 VIEW: CPT | Mod: 26

## 2018-02-22 PROCEDURE — 90832 PSYTX W PT 30 MINUTES: CPT

## 2018-02-22 PROCEDURE — 99232 SBSQ HOSP IP/OBS MODERATE 35: CPT

## 2018-02-22 RX ORDER — DOCUSATE SODIUM 100 MG
100 CAPSULE ORAL THREE TIMES A DAY
Qty: 0 | Refills: 0 | Status: DISCONTINUED | OUTPATIENT
Start: 2018-02-23 | End: 2018-04-10

## 2018-02-22 RX ORDER — MUPIROCIN 20 MG/G
1 OINTMENT TOPICAL ONCE
Qty: 0 | Refills: 0 | Status: COMPLETED | OUTPATIENT
Start: 2018-02-22 | End: 2018-02-22

## 2018-02-22 RX ORDER — PANTOPRAZOLE SODIUM 20 MG/1
40 TABLET, DELAYED RELEASE ORAL DAILY
Qty: 0 | Refills: 0 | Status: DISCONTINUED | OUTPATIENT
Start: 2018-02-23 | End: 2018-02-28

## 2018-02-22 RX ORDER — SODIUM CHLORIDE 9 MG/ML
1000 INJECTION INTRAMUSCULAR; INTRAVENOUS; SUBCUTANEOUS
Qty: 0 | Refills: 0 | Status: DISCONTINUED | OUTPATIENT
Start: 2018-02-23 | End: 2018-03-03

## 2018-02-22 RX ORDER — CHLORHEXIDINE GLUCONATE 213 G/1000ML
1 SOLUTION TOPICAL ONCE
Qty: 0 | Refills: 0 | Status: COMPLETED | OUTPATIENT
Start: 2018-02-22 | End: 2018-02-23

## 2018-02-22 RX ORDER — LIDOCAINE HCL 20 MG/ML
1 VIAL (ML) INJECTION ONCE
Qty: 0 | Refills: 0 | Status: COMPLETED | OUTPATIENT
Start: 2018-02-22 | End: 2018-02-22

## 2018-02-22 RX ORDER — MEPERIDINE HYDROCHLORIDE 50 MG/ML
25 INJECTION INTRAMUSCULAR; INTRAVENOUS; SUBCUTANEOUS ONCE
Qty: 0 | Refills: 0 | Status: DISCONTINUED | OUTPATIENT
Start: 2018-02-23 | End: 2018-02-26

## 2018-02-22 RX ORDER — MYCOPHENOLATE MOFETIL 250 MG/1
1 CAPSULE ORAL ONCE
Qty: 0 | Refills: 0 | Status: DISCONTINUED | OUTPATIENT
Start: 2018-02-22 | End: 2018-02-23

## 2018-02-22 RX ORDER — CHLORHEXIDINE GLUCONATE 213 G/1000ML
15 SOLUTION TOPICAL ONCE
Qty: 0 | Refills: 0 | Status: COMPLETED | OUTPATIENT
Start: 2018-02-22 | End: 2018-02-23

## 2018-02-22 RX ADMIN — PANTOPRAZOLE SODIUM 40 MILLIGRAM(S): 20 TABLET, DELAYED RELEASE ORAL at 14:55

## 2018-02-22 RX ADMIN — QUETIAPINE FUMARATE 50 MILLIGRAM(S): 200 TABLET, FILM COATED ORAL at 21:36

## 2018-02-22 RX ADMIN — Medication 100 MILLIGRAM(S): at 14:55

## 2018-02-22 RX ADMIN — MEXILETINE HYDROCHLORIDE 150 MILLIGRAM(S): 150 CAPSULE ORAL at 19:02

## 2018-02-22 RX ADMIN — ISOSORBIDE DINITRATE 10 MILLIGRAM(S): 5 TABLET ORAL at 14:55

## 2018-02-22 RX ADMIN — ISOSORBIDE DINITRATE 10 MILLIGRAM(S): 5 TABLET ORAL at 21:36

## 2018-02-22 RX ADMIN — Medication 25 MILLIGRAM(S): at 14:55

## 2018-02-22 RX ADMIN — SODIUM CHLORIDE 3 MILLILITER(S): 9 INJECTION INTRAMUSCULAR; INTRAVENOUS; SUBCUTANEOUS at 05:59

## 2018-02-22 RX ADMIN — Medication 650 MILLIGRAM(S): at 14:55

## 2018-02-22 RX ADMIN — Medication 25 MILLIGRAM(S): at 06:10

## 2018-02-22 RX ADMIN — Medication 25 MILLIGRAM(S): at 21:36

## 2018-02-22 RX ADMIN — Medication 500 MILLIGRAM(S): at 06:10

## 2018-02-22 RX ADMIN — Medication 100 MILLIGRAM(S): at 06:10

## 2018-02-22 RX ADMIN — MUPIROCIN 1 APPLICATION(S): 20 OINTMENT TOPICAL at 23:11

## 2018-02-22 RX ADMIN — SODIUM CHLORIDE 3 MILLILITER(S): 9 INJECTION INTRAMUSCULAR; INTRAVENOUS; SUBCUTANEOUS at 21:35

## 2018-02-22 RX ADMIN — Medication 25 MILLIGRAM(S): at 06:09

## 2018-02-22 RX ADMIN — SODIUM CHLORIDE 3 MILLILITER(S): 9 INJECTION INTRAMUSCULAR; INTRAVENOUS; SUBCUTANEOUS at 14:43

## 2018-02-22 RX ADMIN — Medication 1 APPLICATION(S): at 06:10

## 2018-02-22 RX ADMIN — ISOSORBIDE DINITRATE 10 MILLIGRAM(S): 5 TABLET ORAL at 06:09

## 2018-02-22 RX ADMIN — MEXILETINE HYDROCHLORIDE 150 MILLIGRAM(S): 150 CAPSULE ORAL at 06:09

## 2018-02-22 RX ADMIN — HEPARIN SODIUM 8 UNIT(S)/HR: 5000 INJECTION INTRAVENOUS; SUBCUTANEOUS at 10:23

## 2018-02-22 NOTE — PROGRESS NOTE ADULT - ASSESSMENT
Seen resting in bed.  Reports he has been ambulating without distress several times/day.  Appetite good.  Sleeping well at night. Mood upbeat.  Able to teach back aspects of heart transplant education.  Understands need for lifelong immunosuppressant medication; has a strong and reliable support system to assist with medication.  Reviewed other aspects of HT education (close follow-up with team, dietary limitations).  Does well with frequent review of information followed by teach-back.  Brother Nawaf and friend Tahira very involved in his care. Receptive to support and validation.     DX:  Systolic heart failure; r/o Adjustment disorder     Recommendations:   Benefits from ongoing education with teach back of heart transplant education   Behavioral Cardiology will follow as needed

## 2018-02-22 NOTE — PROGRESS NOTE ADULT - ASSESSMENT
Imp:    Leukocytosis resolving    Would continue to observe off anti-microbials    No contra-indications to a heart transplant from an ID perspective at this point      Gary Lujan MD  [phone]  After 5pm/weekends [phone]

## 2018-02-22 NOTE — PROGRESS NOTE ADULT - ASSESSMENT
67M PMH Chronic Systolic Heart Failure (ACC/AHA Stage D) due to NICMP s/p LVAD 6/12/17, GIB s/p Cautery (7/25/2017), known AV Malformations, Chronic Anemia due to numerous GIBs (off AC), A-Fib, VT s/p AICD. Pt admitted due to elevated LDH level of 646 (previously 466) for further evaluation and observation.   2/2 VSS ; INR 1.3- continue heparin and coumadin   meds as per CHF team  2/3 - monitor LDH twice a day; continue heparin and coumadin - INR 1.5- coumadin 3 mg today; continue meds as per CHF team   renal sono to evaluate left renal cyst   2/4 RAMP echo, no speed adjustments made  2/5 . Transplant evaluation in progress  2/6  Integrilin initiated, hydralazine 10mg tid  2/7 , INR 2.17, continue Heparin drip per HF. Increase integrilin drip to 1mcg, hydralazine to 25q8 and lasix daily.  2/8 [id number] INR 2.18  c/w heparin gtt intergrilin gTT 1 mcg  D/C lasix and  aldactone and Hold  coumadin tonight  repeat CBC stat  for 9.0/27.5 down from 11.2/34.7  2/9 Near syncope transferred to SDU PRBCx2 given for HCT 21.4 calcium gluconate D50 and Bicarb transferred to 2CTU in  ARF  2/10 leukocytosis improving on cefepime no source of infection  PRBC x2 coumadin on hold   2/11 intubated  bleeding  scan showing active duodenal bleeding. s/p enteroscopy  with APC for 4 AVM Now PRBC up to PRBC x6  2/12 low dose vasopressin  2/13 stable but anemic  2/14 stable no evidence of infection  2/15 transferred to [address] [place] [address] 2 BM today 7.9/23.6 /   2/16 VVS; H&H stable -->D/C Ferrous sulfate; Continue with IV Iron and current medication regimen; Potassium supplement 2/2 18 beats WCT and K+ level 3.8   2/17 Continue with current medication regimen; Monitor LDH   2/18/18 - coumadin on hold d/t gib- no complaints-d/c planning  2/19/18 - low dose heparin gtt as per DR. Stahl - maintain PTT 40-60. Coumadin remains on hold  LDH down to 616 this am - will check LDH bid.  2/21 Continue with current medication regimen and AC therapy   2/22 ; VVS; Continue with heparin gtt -->maintain PTT 40-60.

## 2018-02-22 NOTE — PROGRESS NOTE ADULT - SUBJECTIVE AND OBJECTIVE BOX
No complaints. Specifically denies any signs or symptoms of bleeding. Walking multiple times a day and feeling good.    Medications:  ascorbic acid 500 milliGRAM(s) Oral two times a day  docusate sodium 100 milliGRAM(s) Oral three times a day  heparin  Infusion 800 Unit(s)/Hr (8 mL/Hr) IV Continuous <Continuous>  hydrALAZINE 25 milliGRAM(s) Oral every 8 hours  hydrocortisone 2.5% Cream 1 Application(s) Topical two times a day  isosorbide   dinitrate Tablet (ISORDIL) 10 milliGRAM(s) Oral three times a day  metoprolol succinate ER 25 milliGRAM(s) Oral daily  mexiletine 150 milliGRAM(s) Oral two times a day  pantoprazole  Injectable 40 milliGRAM(s) IV Push daily  polyethylene glycol 3350 17 Gram(s) Oral daily  QUEtiapine 50 milliGRAM(s) Oral at bedtime  senna 2 Tablet(s) Oral at bedtime  sodium chloride 0.9% lock flush 3 milliLiter(s) IV Push every 8 hours        Vitals:  Vital Signs Last 24 Hrs  T(C): 36.8 (22 Feb 2018 14:00), Max: 37 (22 Feb 2018 02:00)  T(F): 98.3 (22 Feb 2018 14:00), Max: 98.6 (22 Feb 2018 02:00)  HR: 94 (22 Feb 2018 14:00) (86 - 98)  BP: 104/76 (22 Feb 2018 14:00) (79/62 - 111/78)  BP(mean): 85 (22 Feb 2018 14:00) (67 - 88)  RR: 18 (22 Feb 2018 14:00) (16 - 18)  SpO2: 95% (22 Feb 2018 14:00) (93% - 100%)    Daily   I&O's Summary    21 Feb 2018 07:01  -  22 Feb 2018 07:00  --------------------------------------------------------  IN: 1262 mL / OUT: 3400 mL / NET: -2138 mL    22 Feb 2018 07:01  -  22 Feb 2018 17:01  --------------------------------------------------------  IN: 720 mL / OUT: 200 mL / NET: 520 mL        Physical Exam:     General: No distress. Comfortable.  Neck: Neck supple. JVP not elevated. No masses  Chest: Clear to auscultation bilaterally  CV: Typical LVAD sounds. No distal pulses  Abdomen: Soft, non-distended, non-tender  Skin: No rashes or skin breakdown, warm and well perfused  Neurology: Alert and oriented times three. Sensation intact  Psych: Affect normal    LVAD Interrogation: Heart Mate II  Speed: 9200 rpms  Power: 5.8-6.1 raymond  Flow: 5.6-6.2 lpm  PI: 5.1-5.5  Events: no power spikes or PI events  No programming changes were made    Labs:                        8.6    11.7  )-----------( 341      ( 22 Feb 2018 05:54 )             27.5     02-22    138  |  105  |  12  ----------------------------<  99  4.7   |  24  |  1.14    Ca    8.8      22 Feb 2018 05:54  Mg     2.2     02-21       2-22  Lactate Dehydrogenase, Serum: 647 U/L (02-21 @ 06:31)  Lactate Dehydrogenase, Serum: 706 U/L (02-20 @ 17:48)  Lactate Dehydrogenase, Serum: 658 U/L (02-20 @ 05:24)

## 2018-02-22 NOTE — PROGRESS NOTE ADULT - PROBLEM SELECTOR PLAN 2
Currently not requiring diuretics. He is on low dose hydralazine and isordil to manage blood pressure.

## 2018-02-22 NOTE — CHART NOTE - NSCHARTNOTEFT_GEN_A_CORE
Pre-Transplant Verification Form        Pre-Recovery/Pre-Transplant Verification    Date and Time:	Thursday, February 22, 2018 8:47 PM      Donor Information    Donor ID:	[id number]  Match Run Organ:	HL  Match ID:	[id number]  Organ Laterality:	Not Available  Donor ABO:	B        Intended Recipient Information    Waitlist ID:	[id number]  Center's Patient ID:	[id number]  Name:	Yoseph Baez  YOB: 1950  SSN:	[id number]  ABO:	B  Waitlist Organ:	HR  Other Organs:	None    This page can be used to verify data elements where OPTN policy lists the “[employer] computer system” as an acceptable source.      Thank you,   Komal Gonzales   [phone]

## 2018-02-22 NOTE — PROGRESS NOTE ADULT - PROBLEM SELECTOR PLAN 8
Currently UNOS status 1A for OHT.  Current PRA is 0%. Will need repeat antibody screen tomorrow. Transplant coordinator to arrange.

## 2018-02-22 NOTE — PROGRESS NOTE ADULT - SUBJECTIVE AND OBJECTIVE BOX
VITAL SIGNS    Subjective: "Hello; I'm feeling good today"     Telemetry: NSR 70-90    Vital Signs Last 24 Hrs  T(C): 36.7 (02-22-18 @ 10:00), Max: 37 (02-22-18 @ 02:00)  T(F): 98.1 (02-22-18 @ 10:00), Max: 98.6 (02-22-18 @ 02:00)  HR: 98 (02-22-18 @ 10:00) (86 - 98)  BP: 79/62 (02-22-18 @ 10:00) (79/62 - 111/78)  RR: 18 (02-22-18 @ 10:00) (16 - 18)  SpO2: 93% (02-22-18 @ 10:00) (93% - 100%)           02-21 @ 07:01  -  02-22 @ 07:00  --------------------------------------------------------  IN: 1262 mL / OUT: 3400 mL / NET: -2138 mL    PHYSICAL EXAM    Neurology: alert and oriented x 3, nonfocal, no gross deficits    CV:  (+) LVAD Hum     Sternal Wound:  MSI -->CDI , sternum stable    Lungs: CTA B/L     Abdomen: soft, nontender, nondistended, positive bowel sounds, (+) Flatus; (+) BM; RLQ Driveline cath site with occlusive dressing C/D/I      :  Voiding               Extremities:  B/L LE (-) edema; negative calf tenderness; (+) 2 DP palpable        acetaminophen Tablet 650 milliGRAM(s) Oral every 6 hours PRN  ascorbic acid 500 milliGRAM(s) Oral two times a day  docusate sodium 100 milliGRAM(s) Oral three times a day  heparin  Infusion 800 Unit(s)/Hr IV Continuous <Continuous>  hydrALAZINE 25 milliGRAM(s) Oral every 8 hours  hydrocortisone 2.5% Cream 1 Application(s) Topical two times a day  isosorbide dinitrate Tablet (ISORDIL) 10 milliGRAM(s) Oral three times a day  metoprolol succinate ER 25 milliGRAM(s) Oral daily  mexiletine 150 milliGRAM(s) Oral two times a day  pantoprazole  Injectable 40 milliGRAM(s) IV Push daily  polyethylene glycol 3350 17 Gram(s) Oral daily  QUEtiapine 50 milliGRAM(s) Oral at bedtime  senna 2 Tablet(s) Oral at bedtime    Physical Therapy Rec:   Home  [  ]   Home w/ PT  [X ]  Rehab  [  ]    Discussed with Cardiothoracic Team at AM rounds.

## 2018-02-22 NOTE — PROGRESS NOTE ADULT - PROBLEM SELECTOR PLAN 1
LDH is stable, but consistent with recurrent pump thrombosis. He has no power spikes or other evidence of VAD malfunction.   To minimize risk of progression and to balance with risk of recurrent bleeding, we will continue heparin with a goal aPTT of 40-60.

## 2018-02-22 NOTE — PROGRESS NOTE ADULT - SUBJECTIVE AND OBJECTIVE BOX
INFECTIOUS DISEASES FOLLOW UP-- Sujey Lujan  [phone]    This is a follow up note for this  67yMale with  Heart replaced by heart assist device, feeling well no new complaints      ROS:  CONSTITUTIONAL:  No fever, good appetite  CARDIOVASCULAR:  No chest pain or palpitations  RESPIRATORY:  No dyspnea  GASTROINTESTINAL:  No nausea, vomiting, diarrhea, or abdominal pain  GENITOURINARY:  No dysuria  NEUROLOGIC:  No headache,     Allergies    No Known Allergies    Intolerances        ANTIBIOTICS/RELEVANT:  antimicrobials    immunologic:    OTHER:  acetaminophen   Tablet 650 milliGRAM(s) Oral every 6 hours PRN  ascorbic acid 500 milliGRAM(s) Oral two times a day  docusate sodium 100 milliGRAM(s) Oral three times a day  heparin  Infusion 800 Unit(s)/Hr IV Continuous <Continuous>  hydrALAZINE 25 milliGRAM(s) Oral every 8 hours  hydrocortisone 2.5% Cream 1 Application(s) Topical two times a day  isosorbide   dinitrate Tablet (ISORDIL) 10 milliGRAM(s) Oral three times a day  metoprolol succinate ER 25 milliGRAM(s) Oral daily  mexiletine 150 milliGRAM(s) Oral two times a day  pantoprazole  Injectable 40 milliGRAM(s) IV Push daily  polyethylene glycol 3350 17 Gram(s) Oral daily  QUEtiapine 50 milliGRAM(s) Oral at bedtime  senna 2 Tablet(s) Oral at bedtime  sodium chloride 0.9% lock flush 3 milliLiter(s) IV Push every 8 hours      Objective:  Vital Signs Last 24 Hrs  T(C): 36.8 (22 Feb 2018 14:00), Max: 37 (22 Feb 2018 02:00)  T(F): 98.3 (22 Feb 2018 14:00), Max: 98.6 (22 Feb 2018 02:00)  HR: 94 (22 Feb 2018 14:00) (86 - 98)  BP: 104/76 (22 Feb 2018 14:00) (79/62 - 111/78)  BP(mean): 85 (22 Feb 2018 14:00) (67 - 88)  RR: 18 (22 Feb 2018 14:00) (16 - 18)  SpO2: 95% (22 Feb 2018 14:00) (93% - 100%)    PHYSICAL EXAM:  Constitutional:no acute distress  Eyes:WALT, EOMI  Ear/Nose/Throat: no oral lesions, 	  Respiratory: clear BL  Cardiovascular: LVAD sounds, sternotomy site healed  Gastrointestinal:soft, (+) BS, no tenderness  Extremities:no e/e/c  No Lymphadenopathy  IV sites not inflammed.    LABS:                        8.6    11.7  )-----------( 341      ( 22 Feb 2018 05:54 )             27.5     02-22    138  |  105  |  12  ----------------------------<  99  4.7   |  24  |  1.14    Ca    8.8      22 Feb 2018 05:54  Mg     2.2     02-21      PT/INR - ( 22 Feb 2018 05:54 )   PT: 12.0 sec;   INR: 1.11 ratio         PTT - ( 22 Feb 2018 05:54 )  PTT:43.8 sec      MICROBIOLOGY:            RECENT CULTURES:      RADIOLOGY & ADDITIONAL STUDIES:    < from: Xray Chest 1 View- PORTABLE-Routine (02.16.18 @ 06:16) >    IMPRESSION:   Clear lungs.    < end of copied text >

## 2018-02-22 NOTE — PROGRESS NOTE ADULT - ASSESSMENT
Mr. 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 He was admitted for probable pump thrombosis characterized by elevated LDH in the setting of being off anticoagulation for several months. 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 He was treated with APC of an AVM in the duodenum on 2/11 and required 6u blood transfusions. Leukocytosis is improving without intervention and there are no signs or symptoms of infection.    LDH stable on heparin infusion with low aPTT goal.  He is transplantable and is currently listed status 1A.

## 2018-02-23 ENCOUNTER — RESULT REVIEW (OUTPATIENT)
Age: 68
End: 2018-02-23

## 2018-02-23 LAB
ALBUMIN SERPL ELPH-MCNC: 3.2 G/DL — LOW (ref 3.3–5)
ALBUMIN SERPL ELPH-MCNC: 3.2 G/DL — LOW (ref 3.3–5)
ALP SERPL-CCNC: 41 U/L — SIGNIFICANT CHANGE UP (ref 40–120)
ALP SERPL-CCNC: 48 U/L — SIGNIFICANT CHANGE UP (ref 40–120)
ALT FLD-CCNC: 30 U/L RC — SIGNIFICANT CHANGE UP (ref 10–45)
ALT FLD-CCNC: 41 U/L RC — SIGNIFICANT CHANGE UP (ref 10–45)
ANION GAP SERPL CALC-SCNC: 20 MMOL/L — HIGH (ref 5–17)
ANION GAP SERPL CALC-SCNC: 22 MMOL/L — HIGH (ref 5–17)
APTT BLD: 34.8 SEC — SIGNIFICANT CHANGE UP (ref 27.5–37.4)
AST SERPL-CCNC: 235 U/L — HIGH (ref 10–40)
AST SERPL-CCNC: 95 U/L — HIGH (ref 10–40)
BASE EXCESS BLDMV CALC-SCNC: -3.5 MMOL/L — LOW (ref -3–3)
BASE EXCESS BLDV CALC-SCNC: -1.5 MMOL/L — SIGNIFICANT CHANGE UP (ref -2–2)
BASE EXCESS BLDV CALC-SCNC: -3.5 MMOL/L — LOW (ref -2–2)
BASE EXCESS BLDV CALC-SCNC: -4.6 MMOL/L — LOW (ref -2–2)
BASE EXCESS BLDV CALC-SCNC: -5.4 MMOL/L — LOW (ref -2–2)
BASE EXCESS BLDV CALC-SCNC: -5.4 MMOL/L — LOW (ref -2–2)
BASE EXCESS BLDV CALC-SCNC: -5.7 MMOL/L — LOW (ref -2–2)
BASE EXCESS BLDV CALC-SCNC: -6.5 MMOL/L — LOW (ref -2–2)
BASE EXCESS BLDV CALC-SCNC: -6.5 MMOL/L — LOW (ref -2–2)
BASE EXCESS BLDV CALC-SCNC: -8.1 MMOL/L — LOW (ref -2–2)
BASE EXCESS BLDV CALC-SCNC: -8.4 MMOL/L — LOW (ref -2–2)
BASE EXCESS BLDV CALC-SCNC: -8.6 MMOL/L — LOW (ref -2–2)
BASOPHILS # BLD AUTO: 0 K/UL — SIGNIFICANT CHANGE UP (ref 0–0.2)
BILIRUB SERPL-MCNC: 1.2 MG/DL — SIGNIFICANT CHANGE UP (ref 0.2–1.2)
BILIRUB SERPL-MCNC: 2.9 MG/DL — HIGH (ref 0.2–1.2)
BLOOD GAS VENOUS - CREATININE: SIGNIFICANT CHANGE UP MG/DL (ref 0.5–1.3)
BLOOD GAS VENOUS - CREATININE: SIGNIFICANT CHANGE UP MG/DL (ref 0.5–1.3)
BUN SERPL-MCNC: 17 MG/DL — SIGNIFICANT CHANGE UP (ref 7–23)
BUN SERPL-MCNC: 18 MG/DL — SIGNIFICANT CHANGE UP (ref 7–23)
CA-I SERPL-SCNC: 0.96 MMOL/L — LOW (ref 1.12–1.3)
CA-I SERPL-SCNC: 1.49 MMOL/L — HIGH (ref 1.12–1.3)
CALCIUM SERPL-MCNC: 8.4 MG/DL — SIGNIFICANT CHANGE UP (ref 8.4–10.5)
CALCIUM SERPL-MCNC: 9 MG/DL — SIGNIFICANT CHANGE UP (ref 8.4–10.5)
CHLORIDE BLDV-SCNC: SIGNIFICANT CHANGE UP MMOL/L (ref 96–108)
CHLORIDE BLDV-SCNC: SIGNIFICANT CHANGE UP MMOL/L (ref 96–108)
CHLORIDE SERPL-SCNC: 101 MMOL/L — SIGNIFICANT CHANGE UP (ref 96–108)
CHLORIDE SERPL-SCNC: 105 MMOL/L — SIGNIFICANT CHANGE UP (ref 96–108)
CMV IGG FLD QL: >10 U/ML — HIGH
CMV IGG SERPL-IMP: POSITIVE
CMV IGM FLD-ACNC: <8 AU/ML — SIGNIFICANT CHANGE UP
CMV IGM SERPL QL: NEGATIVE — SIGNIFICANT CHANGE UP
CO2 BLDMV-SCNC: 24 MMOL/L — SIGNIFICANT CHANGE UP (ref 21–29)
CO2 BLDV-SCNC: 19 MMOL/L — LOW (ref 22–30)
CO2 BLDV-SCNC: 20 MMOL/L — LOW (ref 22–30)
CO2 BLDV-SCNC: 20 MMOL/L — LOW (ref 22–30)
CO2 BLDV-SCNC: 21 MMOL/L — LOW (ref 22–30)
CO2 BLDV-SCNC: 22 MMOL/L — SIGNIFICANT CHANGE UP (ref 22–30)
CO2 BLDV-SCNC: 23 MMOL/L — SIGNIFICANT CHANGE UP (ref 22–30)
CO2 SERPL-SCNC: 17 MMOL/L — LOW (ref 22–31)
CO2 SERPL-SCNC: 20 MMOL/L — LOW (ref 22–31)
CREAT SERPL-MCNC: 1.5 MG/DL — HIGH (ref 0.5–1.3)
CREAT SERPL-MCNC: 1.55 MG/DL — HIGH (ref 0.5–1.3)
EOSINOPHIL # BLD AUTO: 0.1 K/UL — SIGNIFICANT CHANGE UP (ref 0–0.5)
FIBRINOGEN PPP-MCNC: 462 MG/DL — SIGNIFICANT CHANGE UP (ref 310–510)
GAS PNL BLDA: SIGNIFICANT CHANGE UP
GAS PNL BLDMV: SIGNIFICANT CHANGE UP
GAS PNL BLDV: 136 MMOL/L — SIGNIFICANT CHANGE UP (ref 136–145)
GAS PNL BLDV: 137 MMOL/L — SIGNIFICANT CHANGE UP (ref 136–145)
GAS PNL BLDV: SIGNIFICANT CHANGE UP
GLUCOSE BLDC GLUCOMTR-MCNC: 382 MG/DL — HIGH (ref 70–99)
GLUCOSE BLDV-MCNC: 142 MG/DL — HIGH (ref 70–99)
GLUCOSE BLDV-MCNC: 223 MG/DL — HIGH (ref 70–99)
GLUCOSE SERPL-MCNC: 258 MG/DL — HIGH (ref 70–99)
GLUCOSE SERPL-MCNC: 415 MG/DL — HIGH (ref 70–99)
GRAM STN FLD: SIGNIFICANT CHANGE UP
HCO3 BLDMV-SCNC: 22 MMOL/L — SIGNIFICANT CHANGE UP (ref 20–28)
HCO3 BLDV-SCNC: 18 MMOL/L — LOW (ref 21–29)
HCO3 BLDV-SCNC: 19 MMOL/L — LOW (ref 21–29)
HCO3 BLDV-SCNC: 19 MMOL/L — LOW (ref 21–29)
HCO3 BLDV-SCNC: 20 MMOL/L — LOW (ref 21–29)
HCO3 BLDV-SCNC: 20 MMOL/L — LOW (ref 21–29)
HCO3 BLDV-SCNC: 21 MMOL/L — SIGNIFICANT CHANGE UP (ref 21–29)
HCO3 BLDV-SCNC: 22 MMOL/L — SIGNIFICANT CHANGE UP (ref 21–29)
HCO3 BLDV-SCNC: 22 MMOL/L — SIGNIFICANT CHANGE UP (ref 21–29)
HCO3 BLDV-SCNC: 24 MMOL/L — SIGNIFICANT CHANGE UP (ref 21–29)
HCT VFR BLD CALC: 30 % — LOW (ref 39–50)
HCT VFR BLD CALC: 31.5 % — LOW (ref 39–50)
HCT VFR BLDA CALC: 23 % — LOW (ref 39–50)
HCT VFR BLDA CALC: 26 % — LOW (ref 39–50)
HGB BLD CALC-MCNC: 7.3 G/DL — LOW (ref 13–17)
HGB BLD CALC-MCNC: 8.2 G/DL — LOW (ref 13–17)
HGB BLD-MCNC: 10.1 G/DL — LOW (ref 13–17)
HGB BLD-MCNC: 9.9 G/DL — LOW (ref 13–17)
HOROWITZ INDEX BLDMV+IHG-RTO: 60 — SIGNIFICANT CHANGE UP
HOROWITZ INDEX BLDV+IHG-RTO: 0 — SIGNIFICANT CHANGE UP
HOROWITZ INDEX BLDV+IHG-RTO: 0 — SIGNIFICANT CHANGE UP
HOROWITZ INDEX BLDV+IHG-RTO: 40 — SIGNIFICANT CHANGE UP
HOROWITZ INDEX BLDV+IHG-RTO: 60 — SIGNIFICANT CHANGE UP
INR BLD: 1.24 RATIO — HIGH (ref 0.88–1.16)
LACTATE BLDV-MCNC: 2.2 MMOL/L — HIGH (ref 0.7–2)
LACTATE BLDV-MCNC: 2.8 MMOL/L — HIGH (ref 0.7–2)
LYMPHOCYTES # BLD AUTO: 1 K/UL — SIGNIFICANT CHANGE UP (ref 1–3.3)
LYMPHOCYTES # BLD AUTO: 2 % — LOW (ref 13–44)
MCHC RBC-ENTMCNC: 30.9 PG — SIGNIFICANT CHANGE UP (ref 27–34)
MCHC RBC-ENTMCNC: 31.7 PG — SIGNIFICANT CHANGE UP (ref 27–34)
MCHC RBC-ENTMCNC: 32.1 GM/DL — SIGNIFICANT CHANGE UP (ref 32–36)
MCHC RBC-ENTMCNC: 33 GM/DL — SIGNIFICANT CHANGE UP (ref 32–36)
MCV RBC AUTO: 96 FL — SIGNIFICANT CHANGE UP (ref 80–100)
MCV RBC AUTO: 96.1 FL — SIGNIFICANT CHANGE UP (ref 80–100)
MONOCYTES # BLD AUTO: 1.3 K/UL — HIGH (ref 0–0.9)
MONOCYTES NFR BLD AUTO: 5 % — SIGNIFICANT CHANGE UP (ref 2–14)
NEUTROPHILS # BLD AUTO: 23.3 K/UL — HIGH (ref 1.8–7.4)
NEUTROPHILS NFR BLD AUTO: 79 % — HIGH (ref 43–77)
O2 CT VFR BLD CALC: 41 MMHG — SIGNIFICANT CHANGE UP (ref 30–65)
PCO2 BLDMV: 48 MMHG — SIGNIFICANT CHANGE UP (ref 30–65)
PCO2 BLDV: 42 MMHG — SIGNIFICANT CHANGE UP (ref 35–50)
PCO2 BLDV: 45 MMHG — SIGNIFICANT CHANGE UP (ref 35–50)
PCO2 BLDV: 47 MMHG — SIGNIFICANT CHANGE UP (ref 35–50)
PCO2 BLDV: 48 MMHG — SIGNIFICANT CHANGE UP (ref 35–50)
PCO2 BLDV: 50 MMHG — SIGNIFICANT CHANGE UP (ref 35–50)
PCO2 BLDV: 50 MMHG — SIGNIFICANT CHANGE UP (ref 35–50)
PH BLDMV: 7.29 — LOW (ref 7.32–7.45)
PH BLDV: 7.2 — LOW (ref 7.35–7.45)
PH BLDV: 7.21 — LOW (ref 7.35–7.45)
PH BLDV: 7.24 — LOW (ref 7.35–7.45)
PH BLDV: 7.24 — LOW (ref 7.35–7.45)
PH BLDV: 7.25 — LOW (ref 7.35–7.45)
PH BLDV: 7.26 — LOW (ref 7.35–7.45)
PH BLDV: 7.28 — LOW (ref 7.35–7.45)
PH BLDV: 7.3 — LOW (ref 7.35–7.45)
PH BLDV: 7.31 — LOW (ref 7.35–7.45)
PLATELET # BLD AUTO: 159 K/UL — SIGNIFICANT CHANGE UP (ref 150–400)
PLATELET # BLD AUTO: 176 K/UL — SIGNIFICANT CHANGE UP (ref 150–400)
PO2 BLDV: 36 MMHG — SIGNIFICANT CHANGE UP (ref 25–45)
PO2 BLDV: 36 MMHG — SIGNIFICANT CHANGE UP (ref 25–45)
PO2 BLDV: 37 MMHG — SIGNIFICANT CHANGE UP (ref 25–45)
PO2 BLDV: 38 MMHG — SIGNIFICANT CHANGE UP (ref 25–45)
PO2 BLDV: 38 MMHG — SIGNIFICANT CHANGE UP (ref 25–45)
PO2 BLDV: 39 MMHG — SIGNIFICANT CHANGE UP (ref 25–45)
PO2 BLDV: 39 MMHG — SIGNIFICANT CHANGE UP (ref 25–45)
PO2 BLDV: 40 MMHG — SIGNIFICANT CHANGE UP (ref 25–45)
PO2 BLDV: 41 MMHG — SIGNIFICANT CHANGE UP (ref 25–45)
PO2 BLDV: 52 MMHG — HIGH (ref 25–45)
PO2 BLDV: 52 MMHG — HIGH (ref 25–45)
POTASSIUM BLDV-SCNC: 4.3 MMOL/L — SIGNIFICANT CHANGE UP (ref 3.5–5)
POTASSIUM BLDV-SCNC: 5.1 MMOL/L — HIGH (ref 3.5–5)
POTASSIUM SERPL-MCNC: 3.9 MMOL/L — SIGNIFICANT CHANGE UP (ref 3.5–5.3)
POTASSIUM SERPL-MCNC: 4.2 MMOL/L — SIGNIFICANT CHANGE UP (ref 3.5–5.3)
POTASSIUM SERPL-SCNC: 3.9 MMOL/L — SIGNIFICANT CHANGE UP (ref 3.5–5.3)
POTASSIUM SERPL-SCNC: 4.2 MMOL/L — SIGNIFICANT CHANGE UP (ref 3.5–5.3)
PROT SERPL-MCNC: 5.5 G/DL — LOW (ref 6–8.3)
PROT SERPL-MCNC: 5.6 G/DL — LOW (ref 6–8.3)
PROTHROM AB SERPL-ACNC: 13.4 SEC — HIGH (ref 9.8–12.7)
RBC # BLD: 3.13 M/UL — LOW (ref 4.2–5.8)
RBC # BLD: 3.28 M/UL — LOW (ref 4.2–5.8)
RBC # FLD: 15.4 % — HIGH (ref 10.3–14.5)
RBC # FLD: 16 % — HIGH (ref 10.3–14.5)
SAO2 % BLDMV: 71 % — SIGNIFICANT CHANGE UP (ref 60–90)
SAO2 % BLDV: 61 % — LOW (ref 67–88)
SAO2 % BLDV: 62 % — LOW (ref 67–88)
SAO2 % BLDV: 62 % — LOW (ref 67–88)
SAO2 % BLDV: 63 % — LOW (ref 67–88)
SAO2 % BLDV: 64 % — LOW (ref 67–88)
SAO2 % BLDV: 66 % — LOW (ref 67–88)
SAO2 % BLDV: 66 % — LOW (ref 67–88)
SAO2 % BLDV: 67 % — SIGNIFICANT CHANGE UP (ref 67–88)
SAO2 % BLDV: 67 % — SIGNIFICANT CHANGE UP (ref 67–88)
SAO2 % BLDV: 79 % — SIGNIFICANT CHANGE UP (ref 67–88)
SAO2 % BLDV: 82 % — SIGNIFICANT CHANGE UP (ref 67–88)
SODIUM SERPL-SCNC: 141 MMOL/L — SIGNIFICANT CHANGE UP (ref 135–145)
SODIUM SERPL-SCNC: 144 MMOL/L — SIGNIFICANT CHANGE UP (ref 135–145)
SPECIMEN SOURCE: SIGNIFICANT CHANGE UP
WBC # BLD: 18.5 K/UL — HIGH (ref 3.8–10.5)
WBC # BLD: 25.7 K/UL — HIGH (ref 3.8–10.5)
WBC # FLD AUTO: 18.5 K/UL — HIGH (ref 3.8–10.5)
WBC # FLD AUTO: 25.7 K/UL — HIGH (ref 3.8–10.5)

## 2018-02-23 PROCEDURE — 33241 REMOVE PULSE GENERATOR: CPT

## 2018-02-23 PROCEDURE — 33980 REMOVE INTRACORPOREAL DEVICE: CPT

## 2018-02-23 PROCEDURE — 93325 DOPPLER ECHO COLOR FLOW MAPG: CPT | Mod: 26

## 2018-02-23 PROCEDURE — 33945 TRANSPLANTATION OF HEART: CPT

## 2018-02-23 PROCEDURE — 88305 TISSUE EXAM BY PATHOLOGIST: CPT | Mod: 26

## 2018-02-23 PROCEDURE — 71045 X-RAY EXAM CHEST 1 VIEW: CPT | Mod: 26

## 2018-02-23 PROCEDURE — 93312 ECHO TRANSESOPHAGEAL: CPT | Mod: 26

## 2018-02-23 PROCEDURE — 99292 CRITICAL CARE ADDL 30 MIN: CPT

## 2018-02-23 PROCEDURE — 92970 CARDIOASSIST INTERNAL: CPT

## 2018-02-23 PROCEDURE — 99233 SBSQ HOSP IP/OBS HIGH 50: CPT

## 2018-02-23 PROCEDURE — 93320 DOPPLER ECHO COMPLETE: CPT | Mod: 26

## 2018-02-23 PROCEDURE — 36514 APHERESIS PLASMA: CPT

## 2018-02-23 PROCEDURE — 88309 TISSUE EXAM BY PATHOLOGIST: CPT | Mod: 26

## 2018-02-23 PROCEDURE — 99291 CRITICAL CARE FIRST HOUR: CPT

## 2018-02-23 PROCEDURE — 99222 1ST HOSP IP/OBS MODERATE 55: CPT | Mod: 25

## 2018-02-23 RX ORDER — ACETAMINOPHEN 500 MG
650 TABLET ORAL ONCE
Qty: 0 | Refills: 0 | Status: DISCONTINUED | OUTPATIENT
Start: 2018-02-23 | End: 2018-02-23

## 2018-02-23 RX ORDER — AMIODARONE HYDROCHLORIDE 400 MG/1
0.5 TABLET ORAL
Qty: 900 | Refills: 0 | Status: DISCONTINUED | OUTPATIENT
Start: 2018-02-23 | End: 2018-02-26

## 2018-02-23 RX ORDER — IMMUNE GLOBULIN,GAMMA(IGG) 5 %
8 VIAL (ML) INTRAVENOUS ONCE
Qty: 0 | Refills: 0 | Status: COMPLETED | OUTPATIENT
Start: 2018-02-23 | End: 2018-02-23

## 2018-02-23 RX ORDER — INSULIN HUMAN 100 [IU]/ML
30 INJECTION, SOLUTION SUBCUTANEOUS
Qty: 100 | Refills: 0 | Status: DISCONTINUED | OUTPATIENT
Start: 2018-02-23 | End: 2018-02-28

## 2018-02-23 RX ORDER — PROPOFOL 10 MG/ML
10 INJECTION, EMULSION INTRAVENOUS
Qty: 500 | Refills: 0 | Status: DISCONTINUED | OUTPATIENT
Start: 2018-02-23 | End: 2018-02-25

## 2018-02-23 RX ORDER — AMIODARONE HYDROCHLORIDE 400 MG/1
150 TABLET ORAL ONCE
Qty: 0 | Refills: 0 | Status: COMPLETED | OUTPATIENT
Start: 2018-02-23 | End: 2018-02-23

## 2018-02-23 RX ORDER — CALCIUM GLUCONATE 100 MG/ML
2 VIAL (ML) INTRAVENOUS ONCE
Qty: 0 | Refills: 0 | Status: COMPLETED | OUTPATIENT
Start: 2018-02-23 | End: 2018-02-23

## 2018-02-23 RX ORDER — IMMUNE GLOBULIN,GAMMA(IGG) 5 %
8 VIAL (ML) INTRAVENOUS ONCE
Qty: 0 | Refills: 0 | Status: COMPLETED | OUTPATIENT
Start: 2018-02-25 | End: 2018-02-25

## 2018-02-23 RX ORDER — EPINEPHRINE 0.3 MG/.3ML
0.14 INJECTION INTRAMUSCULAR; SUBCUTANEOUS
Qty: 4 | Refills: 0 | Status: DISCONTINUED | OUTPATIENT
Start: 2018-02-23 | End: 2018-02-24

## 2018-02-23 RX ORDER — VANCOMYCIN HCL 1 G
1000 VIAL (EA) INTRAVENOUS EVERY 12 HOURS
Qty: 0 | Refills: 0 | Status: DISCONTINUED | OUTPATIENT
Start: 2018-02-23 | End: 2018-02-23

## 2018-02-23 RX ORDER — VANCOMYCIN HCL 1 G
1000 VIAL (EA) INTRAVENOUS EVERY 12 HOURS
Qty: 0 | Refills: 0 | Status: COMPLETED | OUTPATIENT
Start: 2018-02-23 | End: 2018-02-24

## 2018-02-23 RX ORDER — LIDOCAINE HCL 20 MG/ML
1 VIAL (ML) INJECTION
Qty: 2 | Refills: 0 | Status: DISCONTINUED | OUTPATIENT
Start: 2018-02-23 | End: 2018-02-23

## 2018-02-23 RX ORDER — MYCOPHENOLATE MOFETIL 250 MG/1
1 CAPSULE ORAL ONCE
Qty: 0 | Refills: 0 | Status: COMPLETED | OUTPATIENT
Start: 2018-02-23 | End: 2018-02-23

## 2018-02-23 RX ORDER — DIPHENHYDRAMINE HCL 50 MG
50 CAPSULE ORAL ONCE
Qty: 0 | Refills: 0 | Status: COMPLETED | OUTPATIENT
Start: 2018-02-25 | End: 2018-02-26

## 2018-02-23 RX ORDER — MYCOPHENOLATE MOFETIL 250 MG/1
1 CAPSULE ORAL
Qty: 0 | Refills: 0 | Status: DISCONTINUED | OUTPATIENT
Start: 2018-02-24 | End: 2018-02-24

## 2018-02-23 RX ORDER — FUROSEMIDE 40 MG
40 TABLET ORAL ONCE
Qty: 0 | Refills: 0 | Status: COMPLETED | OUTPATIENT
Start: 2018-02-23 | End: 2018-02-23

## 2018-02-23 RX ORDER — POTASSIUM CHLORIDE 20 MEQ
10 PACKET (EA) ORAL ONCE
Qty: 0 | Refills: 0 | Status: COMPLETED | OUTPATIENT
Start: 2018-02-23 | End: 2018-02-23

## 2018-02-23 RX ORDER — IMMUNE GLOBULIN,GAMMA(IGG) 5 %
0.8 VIAL (ML) INTRAVENOUS ONCE
Qty: 0 | Refills: 0 | Status: DISCONTINUED | OUTPATIENT
Start: 2018-02-23 | End: 2018-02-23

## 2018-02-23 RX ORDER — NOREPINEPHRINE BITARTRATE/D5W 8 MG/250ML
0.15 PLASTIC BAG, INJECTION (ML) INTRAVENOUS
Qty: 8 | Refills: 0 | Status: DISCONTINUED | OUTPATIENT
Start: 2018-02-23 | End: 2018-02-26

## 2018-02-23 RX ORDER — ACETAMINOPHEN 500 MG
1000 TABLET ORAL ONCE
Qty: 0 | Refills: 0 | Status: COMPLETED | OUTPATIENT
Start: 2018-02-25 | End: 2018-02-26

## 2018-02-23 RX ORDER — AMIODARONE HYDROCHLORIDE 400 MG/1
150 TABLET ORAL ONCE
Qty: 0 | Refills: 0 | Status: DISCONTINUED | OUTPATIENT
Start: 2018-02-23 | End: 2018-02-23

## 2018-02-23 RX ORDER — MEROPENEM 1 G/30ML
1000 INJECTION INTRAVENOUS ONCE
Qty: 0 | Refills: 0 | Status: COMPLETED | OUTPATIENT
Start: 2018-02-23 | End: 2018-02-23

## 2018-02-23 RX ORDER — VASOPRESSIN 20 [USP'U]/ML
0.1 INJECTION INTRAVENOUS
Qty: 100 | Refills: 0 | Status: DISCONTINUED | OUTPATIENT
Start: 2018-02-23 | End: 2018-03-01

## 2018-02-23 RX ORDER — FLUCONAZOLE 150 MG/1
200 TABLET ORAL EVERY 24 HOURS
Qty: 0 | Refills: 0 | Status: DISCONTINUED | OUTPATIENT
Start: 2018-02-24 | End: 2018-02-28

## 2018-02-23 RX ORDER — MEROPENEM 1 G/30ML
1000 INJECTION INTRAVENOUS EVERY 12 HOURS
Qty: 0 | Refills: 0 | Status: DISCONTINUED | OUTPATIENT
Start: 2018-02-24 | End: 2018-02-28

## 2018-02-23 RX ORDER — ACETAMINOPHEN 500 MG
1000 TABLET ORAL ONCE
Qty: 0 | Refills: 0 | Status: COMPLETED | OUTPATIENT
Start: 2018-02-23 | End: 2018-02-23

## 2018-02-23 RX ORDER — FUROSEMIDE 40 MG
5 TABLET ORAL
Qty: 500 | Refills: 0 | Status: DISCONTINUED | OUTPATIENT
Start: 2018-02-23 | End: 2018-02-26

## 2018-02-23 RX ORDER — MAGNESIUM SULFATE 500 MG/ML
2 VIAL (ML) INJECTION ONCE
Qty: 0 | Refills: 0 | Status: COMPLETED | OUTPATIENT
Start: 2018-02-23 | End: 2018-02-23

## 2018-02-23 RX ORDER — MEROPENEM 1 G/30ML
INJECTION INTRAVENOUS
Qty: 0 | Refills: 0 | Status: DISCONTINUED | OUTPATIENT
Start: 2018-02-23 | End: 2018-02-28

## 2018-02-23 RX ORDER — DIPHENHYDRAMINE HCL 50 MG
50 CAPSULE ORAL ONCE
Qty: 0 | Refills: 0 | Status: COMPLETED | OUTPATIENT
Start: 2018-02-23 | End: 2018-02-23

## 2018-02-23 RX ORDER — FLUCONAZOLE 150 MG/1
200 TABLET ORAL ONCE
Qty: 0 | Refills: 0 | Status: COMPLETED | OUTPATIENT
Start: 2018-02-23 | End: 2018-02-23

## 2018-02-23 RX ORDER — CEFUROXIME AXETIL 250 MG
1500 TABLET ORAL EVERY 8 HOURS
Qty: 0 | Refills: 0 | Status: DISCONTINUED | OUTPATIENT
Start: 2018-02-23 | End: 2018-02-23

## 2018-02-23 RX ORDER — FLUCONAZOLE 150 MG/1
TABLET ORAL
Qty: 0 | Refills: 0 | Status: DISCONTINUED | OUTPATIENT
Start: 2018-02-23 | End: 2018-02-28

## 2018-02-23 RX ORDER — DIPHENHYDRAMINE HCL 50 MG
50 CAPSULE ORAL ONCE
Qty: 0 | Refills: 0 | Status: DISCONTINUED | OUTPATIENT
Start: 2018-02-23 | End: 2018-02-23

## 2018-02-23 RX ORDER — MILRINONE LACTATE 1 MG/ML
0.1 INJECTION, SOLUTION INTRAVENOUS
Qty: 20 | Refills: 0 | Status: DISCONTINUED | OUTPATIENT
Start: 2018-02-23 | End: 2018-02-28

## 2018-02-23 RX ADMIN — PROPOFOL 4.66 MICROGRAM(S)/KG/MIN: 10 INJECTION, EMULSION INTRAVENOUS at 13:10

## 2018-02-23 RX ADMIN — AMIODARONE HYDROCHLORIDE 600 MILLIGRAM(S): 400 TABLET ORAL at 22:45

## 2018-02-23 RX ADMIN — Medication 7.5 MG/MIN: at 13:05

## 2018-02-23 RX ADMIN — Medication 40 MILLIGRAM(S): at 13:30

## 2018-02-23 RX ADMIN — CHLORHEXIDINE GLUCONATE 15 MILLILITER(S): 213 SOLUTION TOPICAL at 00:05

## 2018-02-23 RX ADMIN — Medication 50 MILLIEQUIVALENT(S): at 13:00

## 2018-02-23 RX ADMIN — Medication 13.33 GRAM(S): at 17:19

## 2018-02-23 RX ADMIN — FLUCONAZOLE 100 MILLIGRAM(S): 150 TABLET ORAL at 18:41

## 2018-02-23 RX ADMIN — Medication 400 GRAM(S): at 15:13

## 2018-02-23 RX ADMIN — PANTOPRAZOLE SODIUM 40 MILLIGRAM(S): 20 TABLET, DELAYED RELEASE ORAL at 22:05

## 2018-02-23 RX ADMIN — Medication 50 MILLIGRAM(S): at 15:10

## 2018-02-23 RX ADMIN — Medication 200 GRAM(S): at 20:30

## 2018-02-23 RX ADMIN — Medication 50 MILLIEQUIVALENT(S): at 13:30

## 2018-02-23 RX ADMIN — MYCOPHENOLATE MOFETIL 83.5 GRAM(S): 250 CAPSULE ORAL at 13:10

## 2018-02-23 RX ADMIN — AMIODARONE HYDROCHLORIDE 600 MILLIGRAM(S): 400 TABLET ORAL at 12:40

## 2018-02-23 RX ADMIN — Medication 5 MG/HR: at 13:30

## 2018-02-23 RX ADMIN — VASOPRESSIN 6 UNIT(S)/MIN: 20 INJECTION INTRAVENOUS at 12:30

## 2018-02-23 RX ADMIN — EPINEPHRINE 43.71 MICROGRAM(S)/KG/MIN: 0.3 INJECTION INTRAMUSCULAR; SUBCUTANEOUS at 13:29

## 2018-02-23 RX ADMIN — Medication 21.85 MICROGRAM(S)/KG/MIN: at 12:30

## 2018-02-23 RX ADMIN — CHLORHEXIDINE GLUCONATE 1 APPLICATION(S): 213 SOLUTION TOPICAL at 00:05

## 2018-02-23 RX ADMIN — Medication 125 MILLIGRAM(S): at 22:22

## 2018-02-23 RX ADMIN — MEROPENEM 100 MILLIGRAM(S): 1 INJECTION INTRAVENOUS at 18:04

## 2018-02-23 RX ADMIN — Medication 50 MILLIEQUIVALENT(S): at 12:45

## 2018-02-23 RX ADMIN — Medication 50 GRAM(S): at 13:00

## 2018-02-23 RX ADMIN — MYCOPHENOLATE MOFETIL 83.5 GRAM(S): 250 CAPSULE ORAL at 22:05

## 2018-02-23 RX ADMIN — MILRINONE LACTATE 8.74 MICROGRAM(S)/KG/MIN: 1 INJECTION, SOLUTION INTRAVENOUS at 12:30

## 2018-02-23 RX ADMIN — Medication 250 MILLIGRAM(S): at 18:40

## 2018-02-23 RX ADMIN — Medication 125 MILLIGRAM(S): at 12:30

## 2018-02-23 RX ADMIN — Medication 400 MILLIGRAM(S): at 15:00

## 2018-02-23 RX ADMIN — INSULIN HUMAN 8 UNIT(S)/HR: 100 INJECTION, SOLUTION SUBCUTANEOUS at 12:30

## 2018-02-23 NOTE — PROGRESS NOTE ADULT - ASSESSMENT
66 yo man s/p LVAD placement, no infections related to the LVAD prior to undergoing an orthotopic heart transplant earlier today for a reportedly high risk donor HCV+    Patient's baseline serologies:    MMR IgG+  CMV IgG+  EBV IgG+  HSV1 IgG+  toxo IgG+  HAV IgG+  VZV IgG+  HepbSab+  HCV ab -  Quantiferon gold -    Will broaden his [employer]-operative prophylaxis to Vancomycin/Meropenem/Fluconazole based upon his prior LVAD placement and prolonged hospitalization pre-transplant  Will need to follow up on donor serologies for CMV, HCV viral load and genotype,    Patient will be at intermediate risk for CMV and will need Valcyte prophylaxis once renal function stablizes  Patient will be at high risk for HCV acquisition and will treatment post treatment in the future  Patient will be at intermediate risk for toxoplasmosis, but will receive Bactrim prophylaxis for PCP once his renal function, potassium, stabilizes      Gary Lujan MD  [phone]  After 5pm/weekends [phone]

## 2018-02-23 NOTE — PROGRESS NOTE ADULT - SUBJECTIVE AND OBJECTIVE BOX
INFECTIOUS DISEASES FOLLOW UP-- Sujey Lujan  [phone]    This is a follow up note for this  67yMale with  Heart replaced by heart assist device, who underwent an orthotopic heart transplant earlier today from a reportedly high risk donor HCV+. Post transplant in CTU he is being externally paced and is undergoing plasmaphereisis      ROS:  CONSTITUTIONAL:  intubated sedated, on multiple pressors, receiving plasmapheresis    Allergies    No Known Allergies    Intolerances        ANTIBIOTICS/RELEVANT:  antimicrobials  fluconAZOLE IVPB      fluconAZOLE IVPB 200 milliGRAM(s) IV Intermittent once  meropenem  IVPB      vancomycin  IVPB 1000 milliGRAM(s) IV Intermittent every 12 hours    immunologic:  mycophenolate mofetil IVPB 1 Gram(s) IV Intermittent once    OTHER:  amiodarone Infusion 1 mG/Min IV Continuous <Continuous>  docusate sodium 100 milliGRAM(s) Oral three times a day  EPINEPHrine     Infusion 0.15 MICROgram(s)/kG/Min IV Continuous <Continuous>  furosemide Infusion 20 mG/Hr IV Continuous <Continuous>  insulin Infusion 8 Unit(s)/Hr IV Continuous <Continuous>  lidocaine   Infusion 1 mG/Min IV Continuous <Continuous>  meperidine     Injectable 25 milliGRAM(s) IV Push once PRN  methylPREDNISolone sodium succinate Injectable 125 milliGRAM(s) IV Push every 8 hours  milrinone Infusion 0.375 MICROgram(s)/kG/Min IV Continuous <Continuous>  norepinephrine Infusion 0.15 MICROgram(s)/kG/Min IV Continuous <Continuous>  pantoprazole  Injectable 40 milliGRAM(s) IV Push daily  potassium chloride  10 mEq/50 mL IVPB 10 milliEquivalent(s) IV Intermittent once  propofol Infusion 10 MICROgram(s)/kG/Min IV Continuous <Continuous>  sodium chloride 0.9%. 1000 milliLiter(s) IV Continuous <Continuous>  vasopressin Infusion 0.1 Unit(s)/Min IV Continuous <Continuous>      Objective:  Vital Signs Last 24 Hrs  T(C): 37.3 (23 Feb 2018 18:00), Max: 37.3 (23 Feb 2018 18:00)  T(F): 99.1 (23 Feb 2018 18:00), Max: 99.1 (23 Feb 2018 18:00)  HR: 105 (23 Feb 2018 18:00) (88 - 145)  BP: 105/77 (23 Feb 2018 01:06) (105/77 - 112/89)  BP(mean): 87 (23 Feb 2018 01:06) (87 - 104)  RR: 24 (23 Feb 2018 18:00) (5 - 29)  SpO2: 100% (23 Feb 2018 18:00) (74% - 100%)    PHYSICAL EXAM:  Constitutional: intubated, sedated on pressors  Eyes: anicteric  Ear/Nose/Throat: no oral lesions,ET tube in place   left sided CVC site without bleeding or erythema	  Respiratory: clear BL  chest wound tissue apposed  4 chest tubes exit sites visible without drainage  Cardiovascular: paced  Gastrointestinal: distended,  Extremities:no e/e/c  No Lymphadenopathy  IV sites not inflammed.    LABS:                        10.1   25.7  )-----------( 176      ( 23 Feb 2018 12:44 )             31.5     02-23    144  |  105  |  17  ----------------------------<  258<H>  3.9   |  17<L>  |  1.50<H>    Ca    8.4      23 Feb 2018 12:44    TPro  5.6<L>  /  Alb  3.2<L>  /  TBili  2.9<H>  /  DBili  x   /  AST  235<H>  /  ALT  41  /  AlkPhos  48  02-23    PT/INR - ( 23 Feb 2018 12:43 )   PT: 13.4 sec;   INR: 1.24 ratio         PTT - ( 23 Feb 2018 12:43 )  PTT:34.8 sec      MICROBIOLOGY:            RECENT CULTURES:  02-23 @ 16:56  Pericardial Pericardial Fluid  --  --  --  --  --      RADIOLOGY & ADDITIONAL STUDIES:    < from: Xray Chest 1 View- PORTABLE-Routine (02.23.18 @ 11:51) >  IMPRESSION:   NG tube sidehole likely within the proximal stomach, and ideally should   be advanced.  Left central venous catheter terminates in the left brachiocephalic vein.      < end of copied text >

## 2018-02-23 NOTE — CHART NOTE - NSCHARTNOTEFT_GEN_A_CORE
Cardiology Fellow Event Note    Called to OR 4 to turn off tachytherapy. The patient has a [employer] DR 2311-36 ICD. I turned off both VT zones and the VF zone. d/w team in the OR.  There is a plan to explant the device at the conclusion of the operation.     Jhonatan

## 2018-02-23 NOTE — PROGRESS NOTE ADULT - SUBJECTIVE AND OBJECTIVE BOX
BILLY RUSSELL   MRN#: [id number]     The patient is a 67y Male who was seen, evaluated, & examined with the CTICU staff on rounds and later in the day with a multidisciplinary care plan formulated & implemented.  All available clinical, laboratory, radiographic, pharmacologic, and electrocardiographic data were reviewed & analyzed.      The patient was in the CTICU in critical condition secondary to acute hypoxic respiratory failure-persistent cardiopulmonary dysfunction, hemodynamically significant hypovolemia-shock, hyperlactatemia-acidosis, stress hyperglycemia, and cardiogenic shock-cardiovascular dysfunction-S/P heart transplant with graft dysfunction.     Respiratory failure required full ventilatory support, the following of ABG’s with A-line monitoring, continuous pulse oximetry monitoring, inhaled Nitric oxide, an IV Propofol infusion, and an IV Lasix drip for support & to evaluate for & prevent further decompensation secondary to persistent cardiopulmonary dysfunction and cardiogenic shock-cardiovascular dysfunction-S/P heart transplant with graft dysfunction.      Invasive hemodynamic monitoring with an A-line was required for the following of continuous MAP/BP monitoring to ensure adequate cardiovascular support and to evaluate for & help prevent decompensation while receiving intermittent volume expansion, blood transfusions, an IV Levophed drip, an IV Vasopressin drip, an IV Epinephrine drip, inhaled Nitric oxide, IV Primacor drip, an IABP, an IV Lidocaine drip, & external epicardial pacing secondary to hemodynamically significant anemia/hypovolemia-shock, hemodynamically significant anemia/hypovolemia-shock, hyperlactatemia-acidosis, acute postoperative blood loss anemia, & cardiogenic shock-cardiovascular dysfunction-S/P heart transplant with graft dysfunction.  Patient is starting on Plasmapheresis and IV IgG in addition to steroids for graft dysfunction.    Metabolic stability, stress hyperglycemia, & infection prophylaxis required an IV regular Insulin drip & the following of serial glucose levels to help achieve & maintain euglycemia.      Patient required more than the usual postoperative critical care management and I provided 140 minutes of non-continuous care to the patient.  Discussed at length with the CTICU staff and helped coordinate care.

## 2018-02-24 ENCOUNTER — TRANSCRIPTION ENCOUNTER (OUTPATIENT)
Age: 68
End: 2018-02-24

## 2018-02-24 LAB
ALBUMIN SERPL ELPH-MCNC: 3 G/DL — LOW (ref 3.3–5)
ALBUMIN SERPL ELPH-MCNC: 3 G/DL — LOW (ref 3.3–5)
ALBUMIN SERPL ELPH-MCNC: 3.3 G/DL — SIGNIFICANT CHANGE UP (ref 3.3–5)
ALP SERPL-CCNC: 44 U/L — SIGNIFICANT CHANGE UP (ref 40–120)
ALP SERPL-CCNC: 47 U/L — SIGNIFICANT CHANGE UP (ref 40–120)
ALP SERPL-CCNC: 49 U/L — SIGNIFICANT CHANGE UP (ref 40–120)
ALT FLD-CCNC: 31 U/L RC — SIGNIFICANT CHANGE UP (ref 10–45)
ALT FLD-CCNC: 32 U/L RC — SIGNIFICANT CHANGE UP (ref 10–45)
ALT FLD-CCNC: 34 U/L RC — SIGNIFICANT CHANGE UP (ref 10–45)
ANION GAP SERPL CALC-SCNC: 17 MMOL/L — SIGNIFICANT CHANGE UP (ref 5–17)
ANION GAP SERPL CALC-SCNC: 19 MMOL/L — HIGH (ref 5–17)
ANION GAP SERPL CALC-SCNC: 20 MMOL/L — HIGH (ref 5–17)
APTT BLD: 28 SEC — SIGNIFICANT CHANGE UP (ref 27.5–37.4)
AST SERPL-CCNC: 107 U/L — HIGH (ref 10–40)
AST SERPL-CCNC: 108 U/L — HIGH (ref 10–40)
AST SERPL-CCNC: 99 U/L — HIGH (ref 10–40)
BASE EXCESS BLDV CALC-SCNC: -0.3 MMOL/L — SIGNIFICANT CHANGE UP (ref -2–2)
BASE EXCESS BLDV CALC-SCNC: -0.8 MMOL/L — SIGNIFICANT CHANGE UP (ref -2–2)
BASE EXCESS BLDV CALC-SCNC: -2.6 MMOL/L — LOW (ref -2–2)
BASE EXCESS BLDV CALC-SCNC: -3.5 MMOL/L — LOW (ref -2–2)
BASE EXCESS BLDV CALC-SCNC: -3.9 MMOL/L — LOW (ref -2–2)
BASE EXCESS BLDV CALC-SCNC: -5.5 MMOL/L — LOW (ref -2–2)
BASE EXCESS BLDV CALC-SCNC: 1 MMOL/L — SIGNIFICANT CHANGE UP (ref -2–2)
BASE EXCESS BLDV CALC-SCNC: 1.7 MMOL/L — SIGNIFICANT CHANGE UP (ref -2–2)
BASE EXCESS BLDV CALC-SCNC: 2.2 MMOL/L — HIGH (ref -2–2)
BASE EXCESS BLDV CALC-SCNC: 3.1 MMOL/L — HIGH (ref -2–2)
BASE EXCESS BLDV CALC-SCNC: 4 MMOL/L — HIGH (ref -2–2)
BASE EXCESS BLDV CALC-SCNC: 4.1 MMOL/L — HIGH (ref -2–2)
BASE EXCESS BLDV CALC-SCNC: 4.6 MMOL/L — HIGH (ref -2–2)
BASE EXCESS BLDV CALC-SCNC: 4.8 MMOL/L — HIGH (ref -2–2)
BASE EXCESS BLDV CALC-SCNC: 5 MMOL/L — HIGH (ref -2–2)
BASE EXCESS BLDV CALC-SCNC: 5.7 MMOL/L — HIGH (ref -2–2)
BASE EXCESS BLDV CALC-SCNC: 5.7 MMOL/L — HIGH (ref -2–2)
BASE EXCESS BLDV CALC-SCNC: 6.4 MMOL/L — HIGH (ref -2–2)
BILIRUB SERPL-MCNC: 0.8 MG/DL — SIGNIFICANT CHANGE UP (ref 0.2–1.2)
BILIRUB SERPL-MCNC: 0.8 MG/DL — SIGNIFICANT CHANGE UP (ref 0.2–1.2)
BILIRUB SERPL-MCNC: 1 MG/DL — SIGNIFICANT CHANGE UP (ref 0.2–1.2)
BUN SERPL-MCNC: 20 MG/DL — SIGNIFICANT CHANGE UP (ref 7–23)
BUN SERPL-MCNC: 21 MG/DL — SIGNIFICANT CHANGE UP (ref 7–23)
BUN SERPL-MCNC: 24 MG/DL — HIGH (ref 7–23)
CALCIUM SERPL-MCNC: 8.6 MG/DL — SIGNIFICANT CHANGE UP (ref 8.4–10.5)
CALCIUM SERPL-MCNC: 9 MG/DL — SIGNIFICANT CHANGE UP (ref 8.4–10.5)
CALCIUM SERPL-MCNC: 9.1 MG/DL — SIGNIFICANT CHANGE UP (ref 8.4–10.5)
CHLORIDE SERPL-SCNC: 98 MMOL/L — SIGNIFICANT CHANGE UP (ref 96–108)
CHLORIDE SERPL-SCNC: 98 MMOL/L — SIGNIFICANT CHANGE UP (ref 96–108)
CHLORIDE SERPL-SCNC: 99 MMOL/L — SIGNIFICANT CHANGE UP (ref 96–108)
CO2 BLDV-SCNC: 22 MMOL/L — SIGNIFICANT CHANGE UP (ref 22–30)
CO2 BLDV-SCNC: 23 MMOL/L — SIGNIFICANT CHANGE UP (ref 22–30)
CO2 BLDV-SCNC: 24 MMOL/L — SIGNIFICANT CHANGE UP (ref 22–30)
CO2 BLDV-SCNC: 24 MMOL/L — SIGNIFICANT CHANGE UP (ref 22–30)
CO2 BLDV-SCNC: 26 MMOL/L — SIGNIFICANT CHANGE UP (ref 22–30)
CO2 BLDV-SCNC: 26 MMOL/L — SIGNIFICANT CHANGE UP (ref 22–30)
CO2 BLDV-SCNC: 27 MMOL/L — SIGNIFICANT CHANGE UP (ref 22–30)
CO2 BLDV-SCNC: 27 MMOL/L — SIGNIFICANT CHANGE UP (ref 22–30)
CO2 BLDV-SCNC: 28 MMOL/L — SIGNIFICANT CHANGE UP (ref 22–30)
CO2 BLDV-SCNC: 29 MMOL/L — SIGNIFICANT CHANGE UP (ref 22–30)
CO2 BLDV-SCNC: 29 MMOL/L — SIGNIFICANT CHANGE UP (ref 22–30)
CO2 BLDV-SCNC: 30 MMOL/L — SIGNIFICANT CHANGE UP (ref 22–30)
CO2 BLDV-SCNC: 31 MMOL/L — HIGH (ref 22–30)
CO2 SERPL-SCNC: 19 MMOL/L — LOW (ref 22–31)
CO2 SERPL-SCNC: 23 MMOL/L — SIGNIFICANT CHANGE UP (ref 22–31)
CO2 SERPL-SCNC: 24 MMOL/L — SIGNIFICANT CHANGE UP (ref 22–31)
CREAT SERPL-MCNC: 1.59 MG/DL — HIGH (ref 0.5–1.3)
CREAT SERPL-MCNC: 1.61 MG/DL — HIGH (ref 0.5–1.3)
CREAT SERPL-MCNC: 1.62 MG/DL — HIGH (ref 0.5–1.3)
GAS PNL BLDA: SIGNIFICANT CHANGE UP
GAS PNL BLDV: SIGNIFICANT CHANGE UP
GLUCOSE BLDC GLUCOMTR-MCNC: 168 MG/DL — HIGH (ref 70–99)
GLUCOSE BLDC GLUCOMTR-MCNC: 247 MG/DL — HIGH (ref 70–99)
GLUCOSE BLDC GLUCOMTR-MCNC: 302 MG/DL — HIGH (ref 70–99)
GLUCOSE BLDC GLUCOMTR-MCNC: 360 MG/DL — HIGH (ref 70–99)
GLUCOSE SERPL-MCNC: 131 MG/DL — HIGH (ref 70–99)
GLUCOSE SERPL-MCNC: 141 MG/DL — HIGH (ref 70–99)
GLUCOSE SERPL-MCNC: 372 MG/DL — HIGH (ref 70–99)
HCO3 BLDV-SCNC: 20 MMOL/L — LOW (ref 21–29)
HCO3 BLDV-SCNC: 22 MMOL/L — SIGNIFICANT CHANGE UP (ref 21–29)
HCO3 BLDV-SCNC: 22 MMOL/L — SIGNIFICANT CHANGE UP (ref 21–29)
HCO3 BLDV-SCNC: 23 MMOL/L — SIGNIFICANT CHANGE UP (ref 21–29)
HCO3 BLDV-SCNC: 24 MMOL/L — SIGNIFICANT CHANGE UP (ref 21–29)
HCO3 BLDV-SCNC: 25 MMOL/L — SIGNIFICANT CHANGE UP (ref 21–29)
HCO3 BLDV-SCNC: 26 MMOL/L — SIGNIFICANT CHANGE UP (ref 21–29)
HCO3 BLDV-SCNC: 26 MMOL/L — SIGNIFICANT CHANGE UP (ref 21–29)
HCO3 BLDV-SCNC: 27 MMOL/L — SIGNIFICANT CHANGE UP (ref 21–29)
HCO3 BLDV-SCNC: 28 MMOL/L — SIGNIFICANT CHANGE UP (ref 21–29)
HCO3 BLDV-SCNC: 29 MMOL/L — SIGNIFICANT CHANGE UP (ref 21–29)
HCO3 BLDV-SCNC: 30 MMOL/L — HIGH (ref 21–29)
HCT VFR BLD CALC: 30.1 % — LOW (ref 39–50)
HCT VFR BLD CALC: 30.7 % — LOW (ref 39–50)
HCT VFR BLD CALC: 30.9 % — LOW (ref 39–50)
HCT VFR BLD CALC: 31.5 % — LOW (ref 39–50)
HGB BLD-MCNC: 10 G/DL — LOW (ref 13–17)
HGB BLD-MCNC: 9.8 G/DL — LOW (ref 13–17)
HGB BLD-MCNC: 9.9 G/DL — LOW (ref 13–17)
HGB BLD-MCNC: 9.9 G/DL — LOW (ref 13–17)
HOROWITZ INDEX BLDV+IHG-RTO: 40 — SIGNIFICANT CHANGE UP
HOROWITZ INDEX BLDV+IHG-RTO: 60 — SIGNIFICANT CHANGE UP
INR BLD: 1.23 RATIO — HIGH (ref 0.88–1.16)
LDH SERPL L TO P-CCNC: 453 U/L — HIGH (ref 50–242)
MAGNESIUM SERPL-MCNC: 2.1 MG/DL — SIGNIFICANT CHANGE UP (ref 1.6–2.6)
MCHC RBC-ENTMCNC: 30.3 PG — SIGNIFICANT CHANGE UP (ref 27–34)
MCHC RBC-ENTMCNC: 30.6 PG — SIGNIFICANT CHANGE UP (ref 27–34)
MCHC RBC-ENTMCNC: 30.7 PG — SIGNIFICANT CHANGE UP (ref 27–34)
MCHC RBC-ENTMCNC: 31.2 PG — SIGNIFICANT CHANGE UP (ref 27–34)
MCHC RBC-ENTMCNC: 31.6 GM/DL — LOW (ref 32–36)
MCHC RBC-ENTMCNC: 31.8 GM/DL — LOW (ref 32–36)
MCHC RBC-ENTMCNC: 32.2 GM/DL — SIGNIFICANT CHANGE UP (ref 32–36)
MCHC RBC-ENTMCNC: 33 GM/DL — SIGNIFICANT CHANGE UP (ref 32–36)
MCV RBC AUTO: 94.4 FL — SIGNIFICANT CHANGE UP (ref 80–100)
MCV RBC AUTO: 95.2 FL — SIGNIFICANT CHANGE UP (ref 80–100)
MCV RBC AUTO: 95.9 FL — SIGNIFICANT CHANGE UP (ref 80–100)
MCV RBC AUTO: 96.5 FL — SIGNIFICANT CHANGE UP (ref 80–100)
PCO2 BLDV: 39 MMHG — SIGNIFICANT CHANGE UP (ref 35–50)
PCO2 BLDV: 41 MMHG — SIGNIFICANT CHANGE UP (ref 35–50)
PCO2 BLDV: 42 MMHG — SIGNIFICANT CHANGE UP (ref 35–50)
PCO2 BLDV: 43 MMHG — SIGNIFICANT CHANGE UP (ref 35–50)
PCO2 BLDV: 43 MMHG — SIGNIFICANT CHANGE UP (ref 35–50)
PCO2 BLDV: 44 MMHG — SIGNIFICANT CHANGE UP (ref 35–50)
PCO2 BLDV: 45 MMHG — SIGNIFICANT CHANGE UP (ref 35–50)
PCO2 BLDV: 45 MMHG — SIGNIFICANT CHANGE UP (ref 35–50)
PCO2 BLDV: 46 MMHG — SIGNIFICANT CHANGE UP (ref 35–50)
PCO2 BLDV: 47 MMHG — SIGNIFICANT CHANGE UP (ref 35–50)
PCO2 BLDV: 48 MMHG — SIGNIFICANT CHANGE UP (ref 35–50)
PCO2 BLDV: 48 MMHG — SIGNIFICANT CHANGE UP (ref 35–50)
PH BLDV: 7.28 — LOW (ref 7.35–7.45)
PH BLDV: 7.29 — LOW (ref 7.35–7.45)
PH BLDV: 7.3 — LOW (ref 7.35–7.45)
PH BLDV: 7.31 — LOW (ref 7.35–7.45)
PH BLDV: 7.35 — SIGNIFICANT CHANGE UP (ref 7.35–7.45)
PH BLDV: 7.36 — SIGNIFICANT CHANGE UP (ref 7.35–7.45)
PH BLDV: 7.38 — SIGNIFICANT CHANGE UP (ref 7.35–7.45)
PH BLDV: 7.4 — SIGNIFICANT CHANGE UP (ref 7.35–7.45)
PH BLDV: 7.4 — SIGNIFICANT CHANGE UP (ref 7.35–7.45)
PH BLDV: 7.41 — SIGNIFICANT CHANGE UP (ref 7.35–7.45)
PH BLDV: 7.42 — SIGNIFICANT CHANGE UP (ref 7.35–7.45)
PH BLDV: 7.43 — SIGNIFICANT CHANGE UP (ref 7.35–7.45)
PH BLDV: 7.44 — SIGNIFICANT CHANGE UP (ref 7.35–7.45)
PH BLDV: 7.44 — SIGNIFICANT CHANGE UP (ref 7.35–7.45)
PH BLDV: 7.45 — SIGNIFICANT CHANGE UP (ref 7.35–7.45)
PH BLDV: 7.45 — SIGNIFICANT CHANGE UP (ref 7.35–7.45)
PH BLDV: 7.46 — HIGH (ref 7.35–7.45)
PH BLDV: 7.46 — HIGH (ref 7.35–7.45)
PH BLDV: 7.48 — HIGH (ref 7.35–7.45)
PH BLDV: 7.48 — HIGH (ref 7.35–7.45)
PLATELET # BLD AUTO: 163 K/UL — SIGNIFICANT CHANGE UP (ref 150–400)
PLATELET # BLD AUTO: 163 K/UL — SIGNIFICANT CHANGE UP (ref 150–400)
PLATELET # BLD AUTO: 166 K/UL — SIGNIFICANT CHANGE UP (ref 150–400)
PLATELET # BLD AUTO: 172 K/UL — SIGNIFICANT CHANGE UP (ref 150–400)
PO2 BLDV: 29 MMHG — SIGNIFICANT CHANGE UP (ref 25–45)
PO2 BLDV: 31 MMHG — SIGNIFICANT CHANGE UP (ref 25–45)
PO2 BLDV: 31 MMHG — SIGNIFICANT CHANGE UP (ref 25–45)
PO2 BLDV: 32 MMHG — SIGNIFICANT CHANGE UP (ref 25–45)
PO2 BLDV: 33 MMHG — SIGNIFICANT CHANGE UP (ref 25–45)
PO2 BLDV: 33 MMHG — SIGNIFICANT CHANGE UP (ref 25–45)
PO2 BLDV: 34 MMHG — SIGNIFICANT CHANGE UP (ref 25–45)
PO2 BLDV: 36 MMHG — SIGNIFICANT CHANGE UP (ref 25–45)
PO2 BLDV: 36 MMHG — SIGNIFICANT CHANGE UP (ref 25–45)
PO2 BLDV: 37 MMHG — SIGNIFICANT CHANGE UP (ref 25–45)
PO2 BLDV: 37 MMHG — SIGNIFICANT CHANGE UP (ref 25–45)
PO2 BLDV: 38 MMHG — SIGNIFICANT CHANGE UP (ref 25–45)
PO2 BLDV: 38 MMHG — SIGNIFICANT CHANGE UP (ref 25–45)
PO2 BLDV: 44 MMHG — SIGNIFICANT CHANGE UP (ref 25–45)
POTASSIUM SERPL-MCNC: 4.5 MMOL/L — SIGNIFICANT CHANGE UP (ref 3.5–5.3)
POTASSIUM SERPL-MCNC: 4.5 MMOL/L — SIGNIFICANT CHANGE UP (ref 3.5–5.3)
POTASSIUM SERPL-MCNC: 4.6 MMOL/L — SIGNIFICANT CHANGE UP (ref 3.5–5.3)
POTASSIUM SERPL-SCNC: 4.5 MMOL/L — SIGNIFICANT CHANGE UP (ref 3.5–5.3)
POTASSIUM SERPL-SCNC: 4.5 MMOL/L — SIGNIFICANT CHANGE UP (ref 3.5–5.3)
POTASSIUM SERPL-SCNC: 4.6 MMOL/L — SIGNIFICANT CHANGE UP (ref 3.5–5.3)
PROT SERPL-MCNC: 5.8 G/DL — LOW (ref 6–8.3)
PROT SERPL-MCNC: 6.1 G/DL — SIGNIFICANT CHANGE UP (ref 6–8.3)
PROT SERPL-MCNC: 6.3 G/DL — SIGNIFICANT CHANGE UP (ref 6–8.3)
PROTHROM AB SERPL-ACNC: 13.3 SEC — HIGH (ref 9.8–12.7)
RBC # BLD: 3.18 M/UL — LOW (ref 4.2–5.8)
RBC # BLD: 3.21 M/UL — LOW (ref 4.2–5.8)
RBC # BLD: 3.23 M/UL — LOW (ref 4.2–5.8)
RBC # BLD: 3.28 M/UL — LOW (ref 4.2–5.8)
RBC # FLD: 15.8 % — HIGH (ref 10.3–14.5)
RBC # FLD: 15.9 % — HIGH (ref 10.3–14.5)
RBC # FLD: 16 % — HIGH (ref 10.3–14.5)
RBC # FLD: 16 % — HIGH (ref 10.3–14.5)
SAO2 % BLDV: 52 % — LOW (ref 67–88)
SAO2 % BLDV: 55 % — LOW (ref 67–88)
SAO2 % BLDV: 56 % — LOW (ref 67–88)
SAO2 % BLDV: 57 % — LOW (ref 67–88)
SAO2 % BLDV: 57 % — LOW (ref 67–88)
SAO2 % BLDV: 58 % — LOW (ref 67–88)
SAO2 % BLDV: 58 % — LOW (ref 67–88)
SAO2 % BLDV: 59 % — LOW (ref 67–88)
SAO2 % BLDV: 60 % — LOW (ref 67–88)
SAO2 % BLDV: 61 % — LOW (ref 67–88)
SAO2 % BLDV: 62 % — LOW (ref 67–88)
SAO2 % BLDV: 63 % — LOW (ref 67–88)
SAO2 % BLDV: 64 % — LOW (ref 67–88)
SAO2 % BLDV: 65 % — LOW (ref 67–88)
SAO2 % BLDV: 65 % — LOW (ref 67–88)
SAO2 % BLDV: 66 % — LOW (ref 67–88)
SAO2 % BLDV: 66 % — LOW (ref 67–88)
SODIUM SERPL-SCNC: 138 MMOL/L — SIGNIFICANT CHANGE UP (ref 135–145)
SODIUM SERPL-SCNC: 139 MMOL/L — SIGNIFICANT CHANGE UP (ref 135–145)
SODIUM SERPL-SCNC: 140 MMOL/L — SIGNIFICANT CHANGE UP (ref 135–145)
VANCOMYCIN TROUGH SERPL-MCNC: 12.9 UG/ML — SIGNIFICANT CHANGE UP (ref 10–20)
WBC # BLD: 24.9 K/UL — HIGH (ref 3.8–10.5)
WBC # BLD: 30.5 K/UL — HIGH (ref 3.8–10.5)
WBC # BLD: 37.9 K/UL — HIGH (ref 3.8–10.5)
WBC # BLD: 41.8 K/UL — CRITICAL HIGH (ref 3.8–10.5)
WBC # FLD AUTO: 24.9 K/UL — HIGH (ref 3.8–10.5)
WBC # FLD AUTO: 30.5 K/UL — HIGH (ref 3.8–10.5)
WBC # FLD AUTO: 37.9 K/UL — HIGH (ref 3.8–10.5)
WBC # FLD AUTO: 41.8 K/UL — CRITICAL HIGH (ref 3.8–10.5)

## 2018-02-24 PROCEDURE — 99291 CRITICAL CARE FIRST HOUR: CPT

## 2018-02-24 PROCEDURE — 99232 SBSQ HOSP IP/OBS MODERATE 35: CPT

## 2018-02-24 PROCEDURE — 93306 TTE W/DOPPLER COMPLETE: CPT | Mod: 26

## 2018-02-24 PROCEDURE — 71045 X-RAY EXAM CHEST 1 VIEW: CPT | Mod: 26

## 2018-02-24 PROCEDURE — 99292 CRITICAL CARE ADDL 30 MIN: CPT

## 2018-02-24 RX ORDER — HYDROMORPHONE HYDROCHLORIDE 2 MG/ML
0.5 INJECTION INTRAMUSCULAR; INTRAVENOUS; SUBCUTANEOUS ONCE
Qty: 0 | Refills: 0 | Status: DISCONTINUED | OUTPATIENT
Start: 2018-02-24 | End: 2018-02-24

## 2018-02-24 RX ORDER — ALBUMIN HUMAN 25 %
50 VIAL (ML) INTRAVENOUS ONCE
Qty: 0 | Refills: 0 | Status: COMPLETED | OUTPATIENT
Start: 2018-02-24 | End: 2018-02-24

## 2018-02-24 RX ORDER — MYCOPHENOLATE MOFETIL 250 MG/1
1 CAPSULE ORAL
Qty: 0 | Refills: 0 | Status: DISCONTINUED | OUTPATIENT
Start: 2018-02-24 | End: 2018-02-24

## 2018-02-24 RX ORDER — CALCIUM GLUCONATE 100 MG/ML
2 VIAL (ML) INTRAVENOUS ONCE
Qty: 0 | Refills: 0 | Status: COMPLETED | OUTPATIENT
Start: 2018-02-24 | End: 2018-02-24

## 2018-02-24 RX ORDER — FENTANYL CITRATE 50 UG/ML
25 INJECTION INTRAVENOUS ONCE
Qty: 0 | Refills: 0 | Status: DISCONTINUED | OUTPATIENT
Start: 2018-02-24 | End: 2018-02-24

## 2018-02-24 RX ORDER — ACETAMINOPHEN 500 MG
1000 TABLET ORAL ONCE
Qty: 0 | Refills: 0 | Status: COMPLETED | OUTPATIENT
Start: 2018-02-24 | End: 2018-02-24

## 2018-02-24 RX ORDER — MYCOPHENOLATE MOFETIL 250 MG/1
1 CAPSULE ORAL
Qty: 0 | Refills: 0 | Status: DISCONTINUED | OUTPATIENT
Start: 2018-02-24 | End: 2018-02-27

## 2018-02-24 RX ORDER — INSULIN HUMAN 100 [IU]/ML
10 INJECTION, SOLUTION SUBCUTANEOUS ONCE
Qty: 0 | Refills: 0 | Status: COMPLETED | OUTPATIENT
Start: 2018-02-24 | End: 2018-02-24

## 2018-02-24 RX ORDER — VANCOMYCIN HCL 1 G
1000 VIAL (EA) INTRAVENOUS EVERY 12 HOURS
Qty: 0 | Refills: 0 | Status: DISCONTINUED | OUTPATIENT
Start: 2018-02-25 | End: 2018-02-27

## 2018-02-24 RX ADMIN — MILRINONE LACTATE 11.65 MICROGRAM(S)/KG/MIN: 1 INJECTION, SOLUTION INTRAVENOUS at 07:00

## 2018-02-24 RX ADMIN — Medication 21.85 MICROGRAM(S)/KG/MIN: at 07:43

## 2018-02-24 RX ADMIN — Medication 85 MILLIGRAM(S): at 13:02

## 2018-02-24 RX ADMIN — HYDROMORPHONE HYDROCHLORIDE 0.5 MILLIGRAM(S): 2 INJECTION INTRAMUSCULAR; INTRAVENOUS; SUBCUTANEOUS at 20:30

## 2018-02-24 RX ADMIN — MEROPENEM 100 MILLIGRAM(S): 1 INJECTION INTRAVENOUS at 17:22

## 2018-02-24 RX ADMIN — MEROPENEM 100 MILLIGRAM(S): 1 INJECTION INTRAVENOUS at 05:22

## 2018-02-24 RX ADMIN — Medication 400 MILLIGRAM(S): at 03:45

## 2018-02-24 RX ADMIN — Medication 250 MILLIGRAM(S): at 17:22

## 2018-02-24 RX ADMIN — Medication 40 MILLIGRAM(S): at 10:59

## 2018-02-24 RX ADMIN — MYCOPHENOLATE MOFETIL 83.5 GRAM(S): 250 CAPSULE ORAL at 08:04

## 2018-02-24 RX ADMIN — FENTANYL CITRATE 25 MICROGRAM(S): 50 INJECTION INTRAVENOUS at 14:30

## 2018-02-24 RX ADMIN — FENTANYL CITRATE 25 MICROGRAM(S): 50 INJECTION INTRAVENOUS at 14:45

## 2018-02-24 RX ADMIN — FLUCONAZOLE 100 MILLIGRAM(S): 150 TABLET ORAL at 17:21

## 2018-02-24 RX ADMIN — INSULIN HUMAN 10 UNIT(S): 100 INJECTION, SOLUTION SUBCUTANEOUS at 03:45

## 2018-02-24 RX ADMIN — Medication 125 MILLIGRAM(S): at 21:25

## 2018-02-24 RX ADMIN — MYCOPHENOLATE MOFETIL 83.5 GRAM(S): 250 CAPSULE ORAL at 22:28

## 2018-02-24 RX ADMIN — Medication 10 MG/HR: at 07:41

## 2018-02-24 RX ADMIN — Medication 50 MILLILITER(S): at 18:20

## 2018-02-24 RX ADMIN — AMIODARONE HYDROCHLORIDE 33.33 MG/MIN: 400 TABLET ORAL at 07:00

## 2018-02-24 RX ADMIN — PROPOFOL 4.66 MICROGRAM(S)/KG/MIN: 10 INJECTION, EMULSION INTRAVENOUS at 07:42

## 2018-02-24 RX ADMIN — Medication 250 MILLIGRAM(S): at 06:14

## 2018-02-24 RX ADMIN — INSULIN HUMAN 30 UNIT(S)/HR: 100 INJECTION, SOLUTION SUBCUTANEOUS at 07:42

## 2018-02-24 RX ADMIN — Medication 1000 MILLIGRAM(S): at 04:00

## 2018-02-24 RX ADMIN — HYDROMORPHONE HYDROCHLORIDE 0.5 MILLIGRAM(S): 2 INJECTION INTRAMUSCULAR; INTRAVENOUS; SUBCUTANEOUS at 20:45

## 2018-02-24 RX ADMIN — FENTANYL CITRATE 25 MICROGRAM(S): 50 INJECTION INTRAVENOUS at 13:15

## 2018-02-24 RX ADMIN — Medication 125 MILLIGRAM(S): at 05:22

## 2018-02-24 RX ADMIN — Medication 50 MILLILITER(S): at 06:30

## 2018-02-24 RX ADMIN — FENTANYL CITRATE 25 MICROGRAM(S): 50 INJECTION INTRAVENOUS at 13:00

## 2018-02-24 RX ADMIN — PANTOPRAZOLE SODIUM 40 MILLIGRAM(S): 20 TABLET, DELAYED RELEASE ORAL at 12:44

## 2018-02-24 RX ADMIN — VASOPRESSIN 6 UNIT(S)/MIN: 20 INJECTION INTRAVENOUS at 07:43

## 2018-02-24 NOTE — PROGRESS NOTE ADULT - ASSESSMENT
Mr. 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 He was admitted for probable pump thrombosis characterized by elevated LDH in the setting of being off anticoagulation for several months. 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 He was treated with APC of an AVM in the duodenum on 2/11 and required 6u blood transfusions. Leukocytosis is improving without intervention and there are no signs or symptoms of infection. Mr. 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 He was admitted for probable pump thrombosis characterized by elevated LDH in the setting of being off anticoagulation for several months. 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 He was treated with APC of an AVM in the duodenum on 2/11 and required 6u blood transfusions. Subsequent cPRA 1 week after initial pRBCs was 0%.    He is POD #1 OHT, Blood group B. He remains intubated and sedated with non-HLA AMR s/p PP/IVIg round #1 on 2/24/18. CMV D-/R+ and Toxo D-/R+. Has NORMAN due to hemodynamic compromise, but is now improving.    Antibody-mediated rejection:  Round #2 of PP/IVIg scheduled for D[place] morning (tomorrow) at 8 am.  Round #3 of PP/IVIg scheduled for Tuesday morning at 8 am.  Will send blood sample to [facility] for repeat B-cell Flow on Wednesday morning.    Immunosuppression prophylaxis:  Simulect - will defer as his renal function is improving and we will likely start tacrolimus in the next 24h.  Tacrolimus - none today. Likely will initiate tomorrow as 0.5-1.0 mg po SL dosing.  Continue CellCept 1000 mg IV BID. Dosing will be at 10:00/22:00 so as not to interfere with PP.  Solumedrol - would continue 125 mg IV q8h and NOT taper as he is being treated for AMR.    Antimicrobial prophylaxis:  Intermediate risk CMV - Eventually will start IV ganciclovir or oral valganciclovir when more stable. No urgency.  Intermediate risk Toxo - no prophylaxis needed  PCP - delay start of Bactrim until renal function is stable and patient is extubated.  Broadened [employer]-operative coverage x48h per Dr. Lujan: Fluconazole 200 mg IV daily, Meropenem 1 gm IV q12h, Vancomycin 1 gm IV q12h since he was an LVAD explant.     Other prophylaxis:  Please start Protonix 40 mg IV daily for GI prophylaxis while on steroids.    Critical Care Time = 120 minutes.

## 2018-02-24 NOTE — PROGRESS NOTE ADULT - SUBJECTIVE AND OBJECTIVE BOX
Progress Note:   · Provider Specialty	Critical Care	      · Subjective and Objective: 	  BILLY RUSSELL   MRN#: [id number]      The 67M PMH Chronic Systolic Heart Failure (ACC/AHA Stage D) due to NICMP s/p LVAD 6/12/17, GIB s/p Cautery (7/25/2017), known AV Malformations, Chronic Anemia due to numerous GIBs (off AC), A-Fib, VT s/p AICD.  The patient was seen, evaluated, & examined with the CTICU staff on rounds and later in the day with a multidisciplinary care plan formulated & implemented.  All available clinical, laboratory, radiographic, pharmacologic, and electrocardiographic data were reviewed & analyzed.    Home Medications:   * Patient Currently Takes Medications as of 01-Feb-2018 10:30 documented in Structured Notes  · 	folic acid 1 mg oral tablet: 1 tab(s) orally once a day  · 	ascorbic acid 500 mg oral tablet: 1 tab(s) orally once a day  · 	hydrALAZINE 25 mg oral tablet: 1 tab(s) orally every 8 hours  · 	FeroSul 325 mg (65 mg elemental iron) oral tablet: 1  orally 3 times a day  · 	spironolactone 25 mg oral tablet: 1 tab(s) orally once a day  · 	furosemide 20 mg oral tablet: 1 tab(s) orally every other day  · 	carvedilol 12.5 mg oral tablet: 1 tab(s) orally every 12 hours  · 	QUEtiapine 50 mg oral tablet: 1 tab(s) orally once a day  · 	colchicine 0.6 mg oral tablet: 1 tab(s) orally 2 times a day  · 	mexiletine 150 mg oral capsule: 1 cap(s) orally every 8 hours  · 	amiodarone 200 mg oral tablet: 1 tab(s) orally once a day  · 	acetaminophen 325 mg oral tablet: 2 tab(s) orally every 6 hours, As needed, Moderate Pain (4 - 6)  · 	pantoprazole 40 mg oral delayed release tablet: 1 tab(s) orally once a day    The patient was in the CTICU in critical condition secondary to acute hypoxic respiratory failure-persistent cardiopulmonary dysfunction, hemodynamically significant hypovolemia-shock, hyperlactatemia-acidosis, stress hyperglycemia, and cardiogenic shock-cardiovascular dysfunction-S/P heart transplant with graft dysfunction.     Respiratory failure required full ventilatory support, the following of ABG’s with A-line monitoring, continuous pulse oximetry monitoring, inhaled Nitric oxide, an IV Propofol infusion, and an IV Lasix drip for support & to evaluate for & prevent further decompensation secondary to persistent cardiopulmonary dysfunction and cardiogenic shock-cardiovascular dysfunction-S/P heart transplant with graft dysfunction.      Invasive hemodynamic monitoring with an A-line was required for the following of continuous MAP/BP monitoring to ensure adequate cardiovascular support and to evaluate for & help prevent decompensation while receiving intermittent volume expansion, blood transfusions, an IV Levophed drip, an IV Vasopressin drip, an IV Epinephrine drip, inhaled Nitric oxide, IV Primacor drip, an IABP,  & external epicardial pacing secondary to hemodynamically significant anemia/hypovolemia-shock, hemodynamically significant anemia/hypovolemia-shock, hyperlactatemia-acidosis, acute postoperative blood loss anemia, & cardiogenic shock-cardiovascular dysfunction-S/P heart transplant with graft dysfunction. Bedside echo TODAY IMPROVED LV FUNCTION.     Patient is starting on Plasmapheresis and IV IgG in addition to steroids for graft dysfunction. IVIG and Plasmapheresis in AM.     Metabolic stability, stress hyperglycemia, & infection prophylaxis required an IV regular Insulin drip & the following of serial glucose levels to help achieve & maintain euglycemia.      Patient required more than the usual postoperative critical care management and I provided 140 minutes of non-continuous care to the patient.  Discussed at length with the CTICU staff and helped coordinate care. Progress Note:   · Provider Specialty	Critical Care	      · Subjective and Objective: 	  BILLY RUSSELL   MRN#: [id number]      The 67M PMH Chronic Systolic Heart Failure (ACC/AHA Stage D) due to NICMP s/p LVAD 6/12/17, GIB s/p Cautery (7/25/2017), known AV Malformations, Chronic Anemia due to numerous GIBs (off AC), A-Fib, VT s/p AICD.  The patient was seen, evaluated, & examined with the CTICU staff on rounds and later in the day with a multidisciplinary care plan formulated & implemented.  All available clinical, laboratory, radiographic, pharmacologic, and electrocardiographic data were reviewed & analyzed.    Home Medications:   * Patient Currently Takes Medications as of 01-Feb-2018 10:30 documented in Structured Notes  · 	folic acid 1 mg oral tablet: 1 tab(s) orally once a day  · 	ascorbic acid 500 mg oral tablet: 1 tab(s) orally once a day  · 	hydrALAZINE 25 mg oral tablet: 1 tab(s) orally every 8 hours  · 	FeroSul 325 mg (65 mg elemental iron) oral tablet: 1  orally 3 times a day  · 	spironolactone 25 mg oral tablet: 1 tab(s) orally once a day  · 	furosemide 20 mg oral tablet: 1 tab(s) orally every other day  · 	carvedilol 12.5 mg oral tablet: 1 tab(s) orally every 12 hours  · 	QUEtiapine 50 mg oral tablet: 1 tab(s) orally once a day  · 	colchicine 0.6 mg oral tablet: 1 tab(s) orally 2 times a day  · 	mexiletine 150 mg oral capsule: 1 cap(s) orally every 8 hours  · 	amiodarone 200 mg oral tablet: 1 tab(s) orally once a day  · 	acetaminophen 325 mg oral tablet: 2 tab(s) orally every 6 hours, As needed, Moderate Pain (4 - 6)  · 	pantoprazole 40 mg oral delayed release tablet: 1 tab(s) orally once a day    The patient was in the CTICU in critical condition secondary to acute hypoxic respiratory failure-persistent cardiopulmonary dysfunction, hemodynamically significant hypovolemia-shock, hyperlactatemia-acidosis, stress hyperglycemia, and cardiogenic shock-cardiovascular dysfunction-S/P heart transplant with graft dysfunction.     Respiratory failure required full ventilatory support, the following of ABG’s with A-line monitoring, continuous pulse oximetry monitoring, inhaled Nitric oxide, an IV Propofol infusion, and an IV Lasix drip for support & to evaluate for & prevent further decompensation secondary to persistent cardiopulmonary dysfunction and cardiogenic shock-cardiovascular dysfunction-S/P heart transplant with graft dysfunction.      Invasive hemodynamic monitoring with an A-line was required for the following of continuous MAP/BP monitoring to ensure adequate cardiovascular support and to evaluate for & help prevent decompensation while receiving intermittent volume expansion, blood transfusions, an IV Levophed drip, an IV Vasopressin drip, an IV Epinephrine drip, inhaled Nitric oxide, IV Primacor drip, an IABP,  & external epicardial pacing secondary to hemodynamically significant anemia/hypovolemia-shock, hemodynamically significant anemia/hypovolemia-shock, hyperlactatemia-acidosis, acute postoperative blood loss anemia, & cardiogenic shock-cardiovascular dysfunction-S/P heart transplant with graft dysfunction. Bedside echo TODAY IMPROVED LV FUNCTION.     Patient is starting on Plasmapheresis and IV IgG in addition to steroids for graft dysfunction. IVIG and Plasmapheresis in AM.     Metabolic stability, stress hyperglycemia, & infection prophylaxis required an IV regular Insulin drip & the following of serial glucose levels to help achieve & maintain euglycemia.      Patient required more than the usual postoperative critical care management and I provided 110 minutes of non-continuous care to the patient.  Discussed at length with the CTICU staff and helped coordinate care.

## 2018-02-24 NOTE — PROGRESS NOTE ADULT - PROBLEM SELECTOR PLAN 1
s/p heart transplant, POD #1.  As per heart transplant team, will continue inotropes as ordered and wean levophed and vasopressin as tolerated.  Patient will remain intubated and sedated overnight.  Continue lasix gtt for now

## 2018-02-24 NOTE — CONSULT NOTE ADULT - SUBJECTIVE AND OBJECTIVE BOX
HPI:  66 y/o M with PMHx of Chronic Systolic Heart Failure (ACC/AHA Stage D) due to NICMP s/p LVAD 17, GIB s/p Cautery (2017), known AV Malformations, Chronic Anemia due to numerous GIBs (off AC), A-Fib, VT s/p AICD. Pt admitted for probable pump thrombosis characterized by elevated LDH level above 600, for further evaluation and observation.  Pt denies any chest pain, SOB, palpitations, N/D/V, abdominal pain, headaches, changes in vision, dizziness, pain or burning while urinating, swelling, numbness/tingling anywhere, rashes, fever, or recent travel. (2018 19:36)    Interval events. 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 He was treated with APC of the AVM in the duodenum on  and required blood transfusion (6U of PRBC). He was judged transplantable and received a cardiac transplant in the early hours of .     PAST MEDICAL & SURGICAL HISTORY:  GIB (gastrointestinal bleeding)  Ventricular fibrillation: s/p AICD  PAF (paroxysmal atrial fibrillation): on xarelto  Non-Ischemic Cardiomyopathy  SVT (Supraventricular Tachycardia)  HTN  CHF (Congestive Heart Failure)  LVAD (left ventricular assist device) present  Status post left hip replacement  History of Prior Ablation Treatment: for afib  AICD (Automatic Cardioverter/Defibrillator) Present: [place] with 1 [employer] [place] lead09- explanted and replaced with [employer] 2 leads on 09    MEDICATIONS  (STANDING):  amiodarone Infusion 1 mG/Min (33.333 mL/Hr) IV Continuous <Continuous>  docusate sodium 100 milliGRAM(s) Oral three times a day  EPINEPHrine     Infusion 0.14 MICROgram(s)/kG/Min (40.793 mL/Hr) IV Continuous <Continuous>  fluconAZOLE IVPB      fluconAZOLE IVPB 200 milliGRAM(s) IV Intermittent every 24 hours  furosemide Infusion 20 mG/Hr (10 mL/Hr) IV Continuous <Continuous>  insulin Infusion 8 Unit(s)/Hr (8 mL/Hr) IV Continuous <Continuous>  meropenem  IVPB      meropenem  IVPB 1000 milliGRAM(s) IV Intermittent every 12 hours  methylPREDNISolone sodium succinate Injectable 40 milliGRAM(s) IV Push every 12 hours  methylPREDNISolone sodium succinate Injectable 125 milliGRAM(s) IV Push every 8 hours  milrinone Infusion 0.375 MICROgram(s)/kG/Min (8.741 mL/Hr) IV Continuous <Continuous>  mycophenolate mofetil IVPB 1 Gram(s) IV Intermittent <User Schedule>  norepinephrine Infusion 0.15 MICROgram(s)/kG/Min (21.853 mL/Hr) IV Continuous <Continuous>  pantoprazole  Injectable 40 milliGRAM(s) IV Push daily  propofol Infusion 10 MICROgram(s)/kG/Min (4.662 mL/Hr) IV Continuous <Continuous>  sodium chloride 0.9%. 1000 milliLiter(s) (10 mL/Hr) IV Continuous <Continuous>  vancomycin  IVPB 1000 milliGRAM(s) IV Intermittent every 12 hours  vasopressin Infusion 0.1 Unit(s)/Min (6 mL/Hr) IV Continuous <Continuous>    MEDICATIONS  (PRN):  meperidine     Injectable 25 milliGRAM(s) IV Push once PRN For Shivering    FAMILY HISTORY:  No pertinent family history in first degree relatives    ALLERGIES  No Known Allergies    Vital Signs Last 24 Hrs  T(C): 37.6 (2018 01:00), Max: 37.8 (2018 21:00)  T(F): 99.7 (2018 01:00), Max: 100 (2018 21:00)  HR: 105 (2018 01:00) (95 - 145)  BP: --  BP(mean): --  RR: 24 (2018 01:00) (5 - 26)  SpO2: 100% (2018 01:00) (74% - 100%)    Daily Height in cm: 172.7 (2018 08:00)    Daily Weight in k.3 (2018 00:00)    PHYSICAL EXAM:  General: Intubated, sedated  Eyes: Clear sclerae  Respiratory: CTA B/L  CV: S1, S2, RRR, no murmurs  Abdominal: Soft, NT, ND +BS  Musculoskeletal/Extremities: no pedal edema                      9.8    24.9  )-----------( 166      ( 2018 01:30 )             30.9     Hematocrit: 30.9 % (02-24 @ 01:30)  Hematocrit: 30.0 % ( @ 18:58)  Hematocrit: 31.5 % ( @ 12:44)  Hematocrit: 26.7 % ( @ 21:22)  Hematocrit: 27.5 % ( @ 05:54)  Hematocrit: 26.0 % ( @ 06:31)        141  |  101  |  18  ----------------------------<  415<H>  4.2   |  20<L>  |  1.55<H>    Ca    9.0      2018 18:58    TPro  5.5<L>  /  Alb  3.2<L>  /  TBili  1.2  /  DBili  x   /  AST  95<H>  /  ALT  30  /  AlkPhos  41      Lactate Dehydrogenase, Serum: 640 U/L ( @ 05:54)  Lactate Dehydrogenase, Serum: 702 U/L ( @ 18:17)  Lactate Dehydrogenase, Serum: 647 U/L ( @ 06:31)    PT/INR - ( 2018 12:43 )   PT: 13.4 sec;   INR: 1.24 ratio      PTT - ( 2018 12:43 )  PTT:34.8 sec  Fibrinogen Assay: 462 mg/dL ( @ 12:43)

## 2018-02-24 NOTE — CONSULT NOTE ADULT - ASSESSMENT
68 y/o M with congestive heart failure s/p heart transplant, post-operative day #1. The patient is in the CTICU, where a multidisciplinary plan to treat the graft dysfunction is being implemented, including treatment with Therapeutic Plasma Exchange (TPE) and IVIG in addition to steroids. TPE #1 was performed February 23 (one plasma volume with FFP as replacement fluid) and the next procedure is planned for February 25.

## 2018-02-24 NOTE — PROGRESS NOTE ADULT - SUBJECTIVE AND OBJECTIVE BOX
Interval History: POD #1 OHT, blood type B.  Biventricular function has waxed/waned from the end of the OR time into the early morning yesterday. An IABP was placed in the OR. CDC crossmatch was negative, Luminex was negative for DSA, but B-cell flow was strongly positive, both the "self" and donor samples. 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 He was having significant ectopy as well as short runs of NSVT that are suppressed with Amiodarone infusion. He was initiated on plasmapheresis yesterday followed by IVIg infusion. He tolerated it well, is making good urine, and pressor requirement has lessened. He was responsive and moving all extremities during a sedation holiday. Lactate is steadily declining. Graft function looks better on bedside TTE this morning. He remains intubated. Per Dr. Lujan of transplant ID, his antibiotic prophylaxis was broadened in the acute illness period.    Medications:  amiodarone Infusion 1 mG/Min IV Continuous <Continuous>  docusate sodium 100 milliGRAM(s) Oral three times a day  EPINEPHrine 8 milliGRAM(s),sodium chloride 0.9% 250 milliLiter(s) 8 milliGRAM(s) IV Continuous <Continuous>  fluconAZOLE IVPB      fluconAZOLE IVPB 200 milliGRAM(s) IV Intermittent every 24 hours  furosemide Infusion 20 mG/Hr IV Continuous <Continuous>  insulin Infusion 30 Unit(s)/Hr IV Continuous <Continuous>  meperidine     Injectable 25 milliGRAM(s) IV Push once PRN  meropenem  IVPB      meropenem  IVPB 1000 milliGRAM(s) IV Intermittent every 12 hours  methylPREDNISolone sodium succinate Injectable 40 milliGRAM(s) IV Push every 12 hours  milrinone Infusion 0.5 MICROgram(s)/kG/Min IV Continuous <Continuous>  mycophenolate mofetil IVPB 1 Gram(s) IV Intermittent <User Schedule>  norepinephrine Infusion 0.15 MICROgram(s)/kG/Min IV Continuous <Continuous>  pantoprazole  Injectable 40 milliGRAM(s) IV Push daily  propofol Infusion 10 MICROgram(s)/kG/Min IV Continuous <Continuous>  sodium chloride 0.9%. 1000 milliLiter(s) IV Continuous <Continuous>  vancomycin  IVPB 1000 milliGRAM(s) IV Intermittent every 12 hours  vasopressin Infusion 0.1 Unit(s)/Min IV Continuous <Continuous>    Vitals:  T(C): 37.2 (18 @ 08:00), Max: 37.8 (18 @ 21:00)  HR: 106 (18 @ 10:45) (95 - 145)  ABP: 118/48 (18 @ 10:45) (87/68 - 256/128)  ABP(mean): 74 (18 @ 10:45) (61 - 204)  RR: 24 (18 @ 10:45) (5 - 26)  SpO2: 100% (18 @ 10:45) (74% - 100%)    Unable to get [place] access. RIJ appears clotted.  Central saturation 64%    Weight in k.3 (2018 00:00)  Weight (kg): 77.7 ( @ 08:00)    I&O's Summary    2018 07:  -  2018 07:00  --------------------------------------------------------  IN: 4051.9 mL / OUT: 4000 mL / NET: 51.9 mL    2018 07:  -  2018 10:55  --------------------------------------------------------  IN: 370.5 mL / OUT: 465 mL / NET: -94.5 mL        Physical Exam:  Appearance: Intubated, sedated  HEENT: supple  Cardiovascular: RRR, Normal S1 S2, No murmurs/rubs/gallops  Respiratory: Clear to auscultation bilaterally  Gastrointestinal: Soft, Non-tender, non-distended	  Skin: no skin lesions  Neurologic: Non-focal  Extremities: No LE edema, warm and well perfused  Psychiatry: A & O x 3, Mood & affect appropriate      Labs:                        10.0   30.5  )-----------( 172      ( 2018 06:08 )             31.5         138  |  99  |  20  ----------------------------<  372<H>  4.5   |  19<L>  |  1.61<H>    Ca    9.0      2018 01:30  Mg     2.1         TPro  5.8<L>  /  Alb  3.0<L>  /  TBili  1.0  /  DBili  x   /  AST  108<H>  /  ALT  31  /  AlkPhos  44        PT/INR - ( 2018 01:30 )   PT: 13.3 sec;   INR: 1.23 ratio         PTT - ( 2018 01:30 )  PTT:28.0 sec        Oxygen Saturation, Mixed: 71 % ( @ 13:14)    Lactate Dehydrogenase, Serum: 453 U/L ( @ 01:30)  Lactate Dehydrogenase, Serum: 640 U/L ( @ 05:54)  Lactate Dehydrogenase, Serum: 702 U/L ( @ 18:17)          TELEMETRY:    [ ] Echocardiogram: Interval History: POD #1 OHT, blood type B.  Biventricular function has waxed/waned from the end of the OR time into the early morning yesterday. An IABP was placed in the OR. CDC crossmatch was negative, Luminex was negative for DSA, but B-cell flow was strongly positive, both the "self" and donor samples. 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 He was having significant ectopy as well as short runs of NSVT that are suppressed with Amiodarone infusion. He was initiated on plasmapheresis yesterday followed by IVIg infusion. He tolerated it well, is making good urine, and pressor requirement has lessened. He was responsive and moving all extremities during a sedation holiday. Lactate is steadily declining. Graft function looks better on bedside TTE this morning. He remains intubated. Per Dr. Lujan of transplant ID, his antibiotic prophylaxis was broadened in the acute illness period.    Medications:  amiodarone Infusion 1 mG/Min IV Continuous <Continuous>  docusate sodium 100 milliGRAM(s) Oral three times a day  EPINEPHrine 8 milliGRAM(s),sodium chloride 0.9% 250 milliLiter(s) 8 milliGRAM(s) IV Continuous <Continuous>    fluconAZOLE IVPB 200 milliGRAM(s) IV Intermittent every 24 hours  furosemide Infusion 20 mG/Hr IV Continuous <Continuous>  insulin Infusion 30 Unit(s)/Hr IV Continuous <Continuous>  meperidine     Injectable 25 milliGRAM(s) IV Push once PRN     meropenem  IVPB 1000 milliGRAM(s) IV Intermittent every 12 hours  methylPREDNISolone sodium succinate Injectable 40 milliGRAM(s) IV Push every 12 hours  milrinone Infusion 0.5 MICROgram(s)/kG/Min IV Continuous <Continuous>  mycophenolate mofetil IVPB 1 Gram(s) IV Intermittent <User Schedule>  norepinephrine Infusion 0.15 MICROgram(s)/kG/Min IV Continuous <Continuous>  pantoprazole  Injectable 40 milliGRAM(s) IV Push daily  propofol Infusion 10 MICROgram(s)/kG/Min IV Continuous <Continuous>  sodium chloride 0.9%. 1000 milliLiter(s) IV Continuous <Continuous>  vancomycin  IVPB 1000 milliGRAM(s) IV Intermittent every 12 hours  vasopressin Infusion 0.1 Unit(s)/Min IV Continuous <Continuous>    Vitals:  T(C): 37.2 (18 @ 08:00), Max: 37.8 (18 @ 21:00)  HR: 106 (18 @ 10:45) (95 - 145)  ABP: 118/48 (18 @ 10:45) (87/68 - 256/128)  ABP(mean): 74 (18 @ 10:45) (61 - 204)  RR: 24 (18 @ 10:45) (5 - 26)  SpO2: 100% (18 @ 10:45) (74% - 100%)    Unable to get [place] access. RIJ appears clotted.  Central saturation 64%    Weight in k.3 (2018 00:00)  Weight (kg): 77.7 ( @ 08:00)    I&O's Summary    2018 07:  -  2018 07:00  --------------------------------------------------------  IN: 4051.9 mL / OUT: 4000 mL / NET: 51.9 mL    2018 07:  -  2018 10:55  --------------------------------------------------------  IN: 370.5 mL / OUT: 465 mL / NET: -94.5 mL        Physical Exam:  Appearance: Intubated  HEENT: supple  Cardiovascular: tachy, regular, Normal S1 S2, No murmurs/rubs/gallops  Respiratory: Clear to auscultation anterolaterally  Gastrointestinal: Soft, Non-tender, non-distended	  Skin: no skin lesions  Neurologic: Non-focal  Extremities: No LE edema, warm and well perfused  Psychiatry: sedated      Labs:                        10.0   30.5  )-----------( 172      ( 2018 06:08 )             31.5         138  |  99  |  20  ----------------------------<  372<H>  4.5   |  19<L>  |  1.61<H>    Ca    9.0      2018 01:30  Mg     2.1         TPro  5.8<L>  /  Alb  3.0<L>  /  TBili  1.0  /  DBili  x   /  AST  108<H>  /  ALT  31  /  AlkPhos  44      Lactate 3.6 (peak 9.9, downtrending steadily)    PT/INR - ( 2018 01:30 )   PT: 13.3 sec;   INR: 1.23 ratio    PTT - ( 2018 01:30 )  PTT:28.0 sec      Central Oxygen Saturation, Mixed: 71 % ( @ 13:14)    Lactate Dehydrogenase, Serum: 453 U/L ( @ 01:30)  Lactate Dehydrogenase, Serum: 640 U/L ( @ 05:54)  Lactate Dehydrogenase, Serum: 702 U/L ( @ 18:17)    TELEMETRY: Sinus tach, no recent ectopy.    CXR - no effusions or pulmonary edema

## 2018-02-24 NOTE — PROGRESS NOTE ADULT - SUBJECTIVE AND OBJECTIVE BOX
CC: F/U Heart Transplant    Saw/spoke to patient. Patient intubated, sedated. No fevers, no chills. S/p cardiac transplant yesterday.     Allergies  No Known Allergies    ANTIMICROBIALS:  fluconAZOLE IVPB    fluconAZOLE IVPB 200 every 24 hours  meropenem  IVPB    meropenem  IVPB 1000 every 12 hours  vancomycin  IVPB 1000 every 12 hours    PE:    Vital Signs Last 24 Hrs  T(C): 37.2 (24 Feb 2018 08:00), Max: 37.8 (23 Feb 2018 21:00)  T(F): 99 (24 Feb 2018 08:00), Max: 100 (23 Feb 2018 21:00)  HR: 105 (24 Feb 2018 09:00) (95 - 145)  BP: --  BP(mean): --  RR: 24 (24 Feb 2018 09:00) (5 - 26)  SpO2: 100% (24 Feb 2018 09:00) (74% - 100%)    Gen: AO-0, sedated, minimally responsive to stimuli  CV: S1+S2 normal, no murmurs, nontachycardic, midsternal dressing intact; pericardial drains in place  Resp: Intubated, no crackles/wheeze  Abd: Soft, nontender, +BS  Ext: No LE edema, no wounds  Lines: L femoral shiley; L IJ TLC    LABS:                        10.0   30.5  )-----------( 172      ( 24 Feb 2018 06:08 )             31.5     02-24    138  |  99  |  20  ----------------------------<  372<H>  4.5   |  19<L>  |  1.61<H>    Ca    9.0      24 Feb 2018 01:30  Mg     2.1     02-24    TPro  5.8<L>  /  Alb  3.0<L>  /  TBili  1.0  /  DBili  x   /  AST  108<H>  /  ALT  31  /  AlkPhos  44  02-24    MICROBIOLOGY:  Vancomycin Level, Trough: 12.9 ug/mL (02-24-18 @ 05:18)    Pericardial Pericardial Fluid  02-23-18 --  --    No polymorphonuclear cells seen    .Blood Blood-Peripheral  02-15-18   No growth at 5 days.      .Blood Blood-Peripheral  02-15-18   No growth at 5 days.      .Blood Blood-Peripheral  02-09-18   No growth at 5 days.     .Blood Blood-Venous  02-09-18   No growth at 5 days.      CMV IgG Antibody: >10.00 U/mL (02-22-18 @ 23:45)  HIV-1 RNA Quantitative, Viral Load Log: NOT DET. lg /mL (02-02-18 @ 19:25)    (otherwise reviewed)    RADIOLOGY:    2/23 CXR:    IMPRESSION:   NG tube sidehole likely within the proximal stomach, and ideally should   be advanced.  Left central venous catheter terminates in the left brachiocephalic vein.

## 2018-02-24 NOTE — PROGRESS NOTE ADULT - ASSESSMENT
PHYSICAL EXAM    Constitutitional: intubated, sedated    Respiratory: CTA bilaterally, mildly diminished breath sounds at bilateral bases.  No rhonchi, rales    Cardiovascular:  +S1, +S2 Regular rate and rhythm paced on amiodarone gtt at time of exam    Gastrointestinal:  Abdomen is soft, nontender, non distended.     Extremities: No edema, no swelling, no tenderness noted on exam

## 2018-02-24 NOTE — PROGRESS NOTE ADULT - SUBJECTIVE AND OBJECTIVE BOX
Operation s/p Heart transplant  POD  1    Patient remains intubated and sedated; currently on epinephrine gtt, primacor gtt, insulin gtt, amiodarone gtt for ectopy, off levophed and vasopressin gtts over the course of the evening.  Maintaining MAP 75-85, CVP 15-18, making excellent urine ? 100/hour on lasix gtt @ 20mg/hour.   HR paced >100.  chest tube with minimal drainage    Vital Signs Last 24 Hrs  T(C): 37.7 (24 Feb 2018 00:00), Max: 37.8 (23 Feb 2018 21:00)  T(F): 99.9 (24 Feb 2018 00:00), Max: 100 (23 Feb 2018 21:00)  HR: 105 (24 Feb 2018 00:00) (88 - 145)  BP: 105/77 (23 Feb 2018 01:06) (105/77 - 112/89)  BP(mean): 87 (23 Feb 2018 01:06) (87 - 87)  RR: 24 (24 Feb 2018 00:00) (5 - 26)  SpO2: 100% (24 Feb 2018 00:00) (74% - 100%)  I&O's Detail    22 Feb 2018 07:01  -  23 Feb 2018 07:00  --------------------------------------------------------  IN:    heparin Infusion: 96 mL    Oral Fluid: 1200 mL  Total IN: 1296 mL    OUT:    Voided: 1400 mL  Total OUT: 1400 mL    Total NET: -104 mL      23 Feb 2018 07:01  -  24 Feb 2018 00:06  --------------------------------------------------------  IN:    amiodarone Infusion: 249.8 mL    EPINEPHrine Infusion: 82 mL    EPINEPHrine Infusion: 456 mL    furosemide Infusion: 30 mL    furosemide Infusion: 60 mL    insulin Infusion: 218 mL    IV PiggyBack: 617 mL    lidocaine   Infusion: 67.5 mL    milrinone  Infusion: 142.3 mL    norepinephrine Infusion: 166.6 mL    propofol Infusion: 30.4 mL    sodium chloride 0.9%.: 130 mL    vasopressin Infusion: 54 mL  Total IN: 2303.6 mL    OUT:    Chest Tube: 15 mL    Chest Tube: 150 mL    Chest Tube: 295 mL    Indwelling Catheter - Urethral: 1790 mL  Total OUT: 2250 mL    Total NET: 53.5 mL          CHEST TUBES:  on Suction  EPICARDIAL WIRES: AV wires   HOBSON: Yes    MEDICATIONS  (STANDING):  amiodarone Infusion 1 mG/Min (33.333 mL/Hr) IV Continuous <Continuous>  docusate sodium 100 milliGRAM(s) Oral three times a day  EPINEPHrine     Infusion 0.14 MICROgram(s)/kG/Min (40.793 mL/Hr) IV Continuous <Continuous>  fluconAZOLE IVPB      fluconAZOLE IVPB 200 milliGRAM(s) IV Intermittent every 24 hours  furosemide Infusion 20 mG/Hr (10 mL/Hr) IV Continuous <Continuous>  insulin Infusion 8 Unit(s)/Hr (8 mL/Hr) IV Continuous <Continuous>  meropenem  IVPB      meropenem  IVPB 1000 milliGRAM(s) IV Intermittent every 12 hours  methylPREDNISolone sodium succinate Injectable 40 milliGRAM(s) IV Push every 12 hours  methylPREDNISolone sodium succinate Injectable 125 milliGRAM(s) IV Push every 8 hours  milrinone Infusion 0.375 MICROgram(s)/kG/Min (8.741 mL/Hr) IV Continuous <Continuous>  mycophenolate mofetil IVPB 1 Gram(s) IV Intermittent <User Schedule>  norepinephrine Infusion 0.15 MICROgram(s)/kG/Min (21.853 mL/Hr) IV Continuous <Continuous>  pantoprazole  Injectable 40 milliGRAM(s) IV Push daily  propofol Infusion 10 MICROgram(s)/kG/Min (4.662 mL/Hr) IV Continuous <Continuous>  sodium chloride 0.9%. 1000 milliLiter(s) (10 mL/Hr) IV Continuous <Continuous>  vancomycin  IVPB 1000 milliGRAM(s) IV Intermittent every 12 hours  vasopressin Infusion 0.1 Unit(s)/Min (6 mL/Hr) IV Continuous <Continuous>    MEDICATIONS  (PRN):  meperidine     Injectable 25 milliGRAM(s) IV Push once PRN For Shivering      Does the patient have a history of CHF?  -combined systolic and diastolic heart failure     CXR reviewed with attending.     Does the patient have a history of kidney disease?   Creatinine 1.55    Did the patient receive transfusion of blood and/or products?  -received 3 PRBC intraoperatively, 2 platelets, 10 cryoprecipitate    DVT PPX with vendodynes as well as chemical prophylaxis.    Maxine-operative antibiotics to be discontinued within 48 hours of CTU admission  Beta-blockers contraindicated for the first 24 hours due to vasopressor/inotropic medication  Patient care discussed on morning interdisciplinary rounds with CTS team.

## 2018-02-24 NOTE — PROGRESS NOTE ADULT - ASSESSMENT
68 yo man s/p LVAD placement, no infections related to the LVAD prior to undergoing an orthotopic heart transplant 2/23 for a reportedly high risk donor HCV. Patient on broad federica-operative prophylaxis to Vancomycin/Meropenem/Fluconazole based upon his prior LVAD placement and prolonged hospitalization pre-transplant. Intermediate risk for CMV (seropositive); risk for toxoplasmosis (seropositive). Will need to start PPX when able to tolerate PO, stabilization of Cr. S/p cardiac transplant, CMV/toxo serology positive, leukocytosis.  - Continue post operative Vanco/Isabel/Fluconazole  - Bactrim/Valcyte to be added when Cr improved, able to tolerate PO  - Will need to follow up on donor serologies for CMV, HCV viral load and genotype  - Trend WBC, monitor temperatures    Sander Liu MD  Pager [phone]  After 5pm and on weekends call [phone]

## 2018-02-25 LAB
ALBUMIN SERPL ELPH-MCNC: 3.2 G/DL — LOW (ref 3.3–5)
ALBUMIN SERPL ELPH-MCNC: 3.4 G/DL — SIGNIFICANT CHANGE UP (ref 3.3–5)
ALP SERPL-CCNC: 55 U/L — SIGNIFICANT CHANGE UP (ref 40–120)
ALP SERPL-CCNC: 58 U/L — SIGNIFICANT CHANGE UP (ref 40–120)
ALT FLD-CCNC: 29 U/L RC — SIGNIFICANT CHANGE UP (ref 10–45)
ALT FLD-CCNC: 34 U/L RC — SIGNIFICANT CHANGE UP (ref 10–45)
ANION GAP SERPL CALC-SCNC: 13 MMOL/L — SIGNIFICANT CHANGE UP (ref 5–17)
ANION GAP SERPL CALC-SCNC: 15 MMOL/L — SIGNIFICANT CHANGE UP (ref 5–17)
APTT BLD: 26.6 SEC — LOW (ref 27.5–37.4)
AST SERPL-CCNC: 74 U/L — HIGH (ref 10–40)
AST SERPL-CCNC: 91 U/L — HIGH (ref 10–40)
BASE EXCESS BLDV CALC-SCNC: -0.7 MMOL/L — SIGNIFICANT CHANGE UP (ref -2–2)
BASE EXCESS BLDV CALC-SCNC: 3.9 MMOL/L — HIGH (ref -2–2)
BASE EXCESS BLDV CALC-SCNC: 4.2 MMOL/L — HIGH (ref -2–2)
BASE EXCESS BLDV CALC-SCNC: 4.2 MMOL/L — HIGH (ref -2–2)
BASE EXCESS BLDV CALC-SCNC: 4.3 MMOL/L — HIGH (ref -2–2)
BASE EXCESS BLDV CALC-SCNC: 4.6 MMOL/L — HIGH (ref -2–2)
BASE EXCESS BLDV CALC-SCNC: 5 MMOL/L — HIGH (ref -2–2)
BASE EXCESS BLDV CALC-SCNC: 5 MMOL/L — HIGH (ref -2–2)
BASE EXCESS BLDV CALC-SCNC: 5.3 MMOL/L — HIGH (ref -2–2)
BASE EXCESS BLDV CALC-SCNC: 5.4 MMOL/L — HIGH (ref -2–2)
BASE EXCESS BLDV CALC-SCNC: 5.4 MMOL/L — HIGH (ref -2–2)
BASE EXCESS BLDV CALC-SCNC: 5.5 MMOL/L — HIGH (ref -2–2)
BASE EXCESS BLDV CALC-SCNC: 5.5 MMOL/L — HIGH (ref -2–2)
BASE EXCESS BLDV CALC-SCNC: 5.6 MMOL/L — HIGH (ref -2–2)
BASE EXCESS BLDV CALC-SCNC: 5.6 MMOL/L — HIGH (ref -2–2)
BASE EXCESS BLDV CALC-SCNC: 5.7 MMOL/L — HIGH (ref -2–2)
BASE EXCESS BLDV CALC-SCNC: 5.8 MMOL/L — HIGH (ref -2–2)
BASE EXCESS BLDV CALC-SCNC: 5.8 MMOL/L — HIGH (ref -2–2)
BASE EXCESS BLDV CALC-SCNC: 5.9 MMOL/L — HIGH (ref -2–2)
BASE EXCESS BLDV CALC-SCNC: 6 MMOL/L — HIGH (ref -2–2)
BASE EXCESS BLDV CALC-SCNC: 6.3 MMOL/L — HIGH (ref -2–2)
BASE EXCESS BLDV CALC-SCNC: 6.4 MMOL/L — HIGH (ref -2–2)
BILIRUB SERPL-MCNC: 0.6 MG/DL — SIGNIFICANT CHANGE UP (ref 0.2–1.2)
BILIRUB SERPL-MCNC: 0.7 MG/DL — SIGNIFICANT CHANGE UP (ref 0.2–1.2)
BUN SERPL-MCNC: 28 MG/DL — HIGH (ref 7–23)
BUN SERPL-MCNC: 38 MG/DL — HIGH (ref 7–23)
CALCIUM SERPL-MCNC: 7.9 MG/DL — LOW (ref 8.4–10.5)
CALCIUM SERPL-MCNC: 8.5 MG/DL — SIGNIFICANT CHANGE UP (ref 8.4–10.5)
CHLORIDE SERPL-SCNC: 94 MMOL/L — LOW (ref 96–108)
CHLORIDE SERPL-SCNC: 96 MMOL/L — SIGNIFICANT CHANGE UP (ref 96–108)
CO2 BLDV-SCNC: 24 MMOL/L — SIGNIFICANT CHANGE UP (ref 22–30)
CO2 BLDV-SCNC: 30 MMOL/L — SIGNIFICANT CHANGE UP (ref 22–30)
CO2 BLDV-SCNC: 30 MMOL/L — SIGNIFICANT CHANGE UP (ref 22–30)
CO2 BLDV-SCNC: 31 MMOL/L — HIGH (ref 22–30)
CO2 BLDV-SCNC: 32 MMOL/L — HIGH (ref 22–30)
CO2 SERPL-SCNC: 27 MMOL/L — SIGNIFICANT CHANGE UP (ref 22–31)
CO2 SERPL-SCNC: 28 MMOL/L — SIGNIFICANT CHANGE UP (ref 22–31)
CREAT SERPL-MCNC: 1.59 MG/DL — HIGH (ref 0.5–1.3)
CREAT SERPL-MCNC: 1.66 MG/DL — HIGH (ref 0.5–1.3)
GAS PNL BLDA: SIGNIFICANT CHANGE UP
GAS PNL BLDV: SIGNIFICANT CHANGE UP
GLUCOSE BLDC GLUCOMTR-MCNC: 112 MG/DL — HIGH (ref 70–99)
GLUCOSE SERPL-MCNC: 124 MG/DL — HIGH (ref 70–99)
GLUCOSE SERPL-MCNC: 167 MG/DL — HIGH (ref 70–99)
HCO3 BLDV-SCNC: 23 MMOL/L — SIGNIFICANT CHANGE UP (ref 21–29)
HCO3 BLDV-SCNC: 29 MMOL/L — SIGNIFICANT CHANGE UP (ref 21–29)
HCO3 BLDV-SCNC: 30 MMOL/L — HIGH (ref 21–29)
HCO3 BLDV-SCNC: 31 MMOL/L — HIGH (ref 21–29)
HCT VFR BLD CALC: 27.8 % — LOW (ref 39–50)
HCT VFR BLD CALC: 29.1 % — LOW (ref 39–50)
HCYS SERPL-MCNC: 8.9 UMOL/L — SIGNIFICANT CHANGE UP (ref 5–20)
HGB BLD-MCNC: 9 G/DL — LOW (ref 13–17)
HGB BLD-MCNC: 9.7 G/DL — LOW (ref 13–17)
HOROWITZ INDEX BLDV+IHG-RTO: 60 — SIGNIFICANT CHANGE UP
INR BLD: 1.18 RATIO — HIGH (ref 0.88–1.16)
LDH SERPL L TO P-CCNC: 527 U/L — HIGH (ref 50–242)
LDH SERPL L TO P-CCNC: 550 U/L — HIGH (ref 50–242)
MAGNESIUM SERPL-MCNC: 1.8 MG/DL — SIGNIFICANT CHANGE UP (ref 1.6–2.6)
MCHC RBC-ENTMCNC: 30.8 PG — SIGNIFICANT CHANGE UP (ref 27–34)
MCHC RBC-ENTMCNC: 31.7 PG — SIGNIFICANT CHANGE UP (ref 27–34)
MCHC RBC-ENTMCNC: 32.5 GM/DL — SIGNIFICANT CHANGE UP (ref 32–36)
MCHC RBC-ENTMCNC: 33.4 GM/DL — SIGNIFICANT CHANGE UP (ref 32–36)
MCV RBC AUTO: 94.7 FL — SIGNIFICANT CHANGE UP (ref 80–100)
MCV RBC AUTO: 94.8 FL — SIGNIFICANT CHANGE UP (ref 80–100)
PCO2 BLDV: 34 MMHG — LOW (ref 35–50)
PCO2 BLDV: 44 MMHG — SIGNIFICANT CHANGE UP (ref 35–50)
PCO2 BLDV: 44 MMHG — SIGNIFICANT CHANGE UP (ref 35–50)
PCO2 BLDV: 45 MMHG — SIGNIFICANT CHANGE UP (ref 35–50)
PCO2 BLDV: 46 MMHG — SIGNIFICANT CHANGE UP (ref 35–50)
PCO2 BLDV: 47 MMHG — SIGNIFICANT CHANGE UP (ref 35–50)
PCO2 BLDV: 47 MMHG — SIGNIFICANT CHANGE UP (ref 35–50)
PCO2 BLDV: 48 MMHG — SIGNIFICANT CHANGE UP (ref 35–50)
PCO2 BLDV: 49 MMHG — SIGNIFICANT CHANGE UP (ref 35–50)
PCO2 BLDV: 50 MMHG — SIGNIFICANT CHANGE UP (ref 35–50)
PCO2 BLDV: 52 MMHG — HIGH (ref 35–50)
PH BLDV: 7.38 — SIGNIFICANT CHANGE UP (ref 7.35–7.45)
PH BLDV: 7.39 — SIGNIFICANT CHANGE UP (ref 7.35–7.45)
PH BLDV: 7.4 — SIGNIFICANT CHANGE UP (ref 7.35–7.45)
PH BLDV: 7.41 — SIGNIFICANT CHANGE UP (ref 7.35–7.45)
PH BLDV: 7.41 — SIGNIFICANT CHANGE UP (ref 7.35–7.45)
PH BLDV: 7.42 — SIGNIFICANT CHANGE UP (ref 7.35–7.45)
PH BLDV: 7.42 — SIGNIFICANT CHANGE UP (ref 7.35–7.45)
PH BLDV: 7.43 — SIGNIFICANT CHANGE UP (ref 7.35–7.45)
PH BLDV: 7.44 — SIGNIFICANT CHANGE UP (ref 7.35–7.45)
PH BLDV: 7.45 — SIGNIFICANT CHANGE UP (ref 7.35–7.45)
PH BLDV: 7.45 — SIGNIFICANT CHANGE UP (ref 7.35–7.45)
PLATELET # BLD AUTO: 147 K/UL — LOW (ref 150–400)
PLATELET # BLD AUTO: 168 K/UL — SIGNIFICANT CHANGE UP (ref 150–400)
PO2 BLDV: 32 MMHG — SIGNIFICANT CHANGE UP (ref 25–45)
PO2 BLDV: 33 MMHG — SIGNIFICANT CHANGE UP (ref 25–45)
PO2 BLDV: 37 MMHG — SIGNIFICANT CHANGE UP (ref 25–45)
PO2 BLDV: 37 MMHG — SIGNIFICANT CHANGE UP (ref 25–45)
PO2 BLDV: 38 MMHG — SIGNIFICANT CHANGE UP (ref 25–45)
PO2 BLDV: 39 MMHG — SIGNIFICANT CHANGE UP (ref 25–45)
PO2 BLDV: 40 MMHG — SIGNIFICANT CHANGE UP (ref 25–45)
PO2 BLDV: 41 MMHG — SIGNIFICANT CHANGE UP (ref 25–45)
PO2 BLDV: 41 MMHG — SIGNIFICANT CHANGE UP (ref 25–45)
PO2 BLDV: 42 MMHG — SIGNIFICANT CHANGE UP (ref 25–45)
POTASSIUM SERPL-MCNC: 4.6 MMOL/L — SIGNIFICANT CHANGE UP (ref 3.5–5.3)
POTASSIUM SERPL-MCNC: 4.8 MMOL/L — SIGNIFICANT CHANGE UP (ref 3.5–5.3)
POTASSIUM SERPL-SCNC: 4.6 MMOL/L — SIGNIFICANT CHANGE UP (ref 3.5–5.3)
POTASSIUM SERPL-SCNC: 4.8 MMOL/L — SIGNIFICANT CHANGE UP (ref 3.5–5.3)
PROT SERPL-MCNC: 6.5 G/DL — SIGNIFICANT CHANGE UP (ref 6–8.3)
PROT SERPL-MCNC: 6.7 G/DL — SIGNIFICANT CHANGE UP (ref 6–8.3)
PROTHROM AB SERPL-ACNC: 12.9 SEC — HIGH (ref 9.8–12.7)
RBC # BLD: 2.93 M/UL — LOW (ref 4.2–5.8)
RBC # BLD: 3.07 M/UL — LOW (ref 4.2–5.8)
RBC # FLD: 16 % — HIGH (ref 10.3–14.5)
RBC # FLD: 16.3 % — HIGH (ref 10.3–14.5)
SAO2 % BLDV: 57 % — LOW (ref 67–88)
SAO2 % BLDV: 62 % — LOW (ref 67–88)
SAO2 % BLDV: 62 % — LOW (ref 67–88)
SAO2 % BLDV: 65 % — LOW (ref 67–88)
SAO2 % BLDV: 66 % — LOW (ref 67–88)
SAO2 % BLDV: 67 % — SIGNIFICANT CHANGE UP (ref 67–88)
SAO2 % BLDV: 67 % — SIGNIFICANT CHANGE UP (ref 67–88)
SAO2 % BLDV: 68 % — SIGNIFICANT CHANGE UP (ref 67–88)
SAO2 % BLDV: 69 % — SIGNIFICANT CHANGE UP (ref 67–88)
SAO2 % BLDV: 70 % — SIGNIFICANT CHANGE UP (ref 67–88)
SAO2 % BLDV: 70 % — SIGNIFICANT CHANGE UP (ref 67–88)
SAO2 % BLDV: 71 % — SIGNIFICANT CHANGE UP (ref 67–88)
SAO2 % BLDV: 73 % — SIGNIFICANT CHANGE UP (ref 67–88)
SODIUM SERPL-SCNC: 135 MMOL/L — SIGNIFICANT CHANGE UP (ref 135–145)
SODIUM SERPL-SCNC: 138 MMOL/L — SIGNIFICANT CHANGE UP (ref 135–145)
VANCOMYCIN TROUGH SERPL-MCNC: 17.2 UG/ML — SIGNIFICANT CHANGE UP (ref 10–20)
WBC # BLD: 46.4 K/UL — CRITICAL HIGH (ref 3.8–10.5)
WBC # BLD: 47 K/UL — CRITICAL HIGH (ref 3.8–10.5)
WBC # FLD AUTO: 46.4 K/UL — CRITICAL HIGH (ref 3.8–10.5)
WBC # FLD AUTO: 47 K/UL — CRITICAL HIGH (ref 3.8–10.5)

## 2018-02-25 PROCEDURE — 99292 CRITICAL CARE ADDL 30 MIN: CPT

## 2018-02-25 PROCEDURE — 99291 CRITICAL CARE FIRST HOUR: CPT

## 2018-02-25 PROCEDURE — 99232 SBSQ HOSP IP/OBS MODERATE 35: CPT

## 2018-02-25 PROCEDURE — 71045 X-RAY EXAM CHEST 1 VIEW: CPT | Mod: 26

## 2018-02-25 PROCEDURE — 71045 X-RAY EXAM CHEST 1 VIEW: CPT | Mod: 26,77

## 2018-02-25 PROCEDURE — G0452: CPT | Mod: 26

## 2018-02-25 RX ORDER — CALCIUM GLUCONATE 100 MG/ML
1 VIAL (ML) INTRAVENOUS ONCE
Qty: 0 | Refills: 0 | Status: COMPLETED | OUTPATIENT
Start: 2018-02-25 | End: 2018-02-25

## 2018-02-25 RX ORDER — TACROLIMUS 5 MG/1
0.5 CAPSULE ORAL EVERY 12 HOURS
Qty: 0 | Refills: 0 | Status: DISCONTINUED | OUTPATIENT
Start: 2018-02-25 | End: 2018-02-26

## 2018-02-25 RX ORDER — DEXMEDETOMIDINE HYDROCHLORIDE IN 0.9% SODIUM CHLORIDE 4 UG/ML
0.5 INJECTION INTRAVENOUS
Qty: 200 | Refills: 0 | Status: DISCONTINUED | OUTPATIENT
Start: 2018-02-25 | End: 2018-02-27

## 2018-02-25 RX ORDER — HYDROMORPHONE HYDROCHLORIDE 2 MG/ML
0.5 INJECTION INTRAMUSCULAR; INTRAVENOUS; SUBCUTANEOUS ONCE
Qty: 0 | Refills: 0 | Status: DISCONTINUED | OUTPATIENT
Start: 2018-02-25 | End: 2018-02-25

## 2018-02-25 RX ORDER — FENTANYL CITRATE 50 UG/ML
50 INJECTION INTRAVENOUS ONCE
Qty: 0 | Refills: 0 | Status: DISCONTINUED | OUTPATIENT
Start: 2018-02-25 | End: 2018-02-25

## 2018-02-25 RX ORDER — METRONIDAZOLE 500 MG
TABLET ORAL
Qty: 0 | Refills: 0 | Status: DISCONTINUED | OUTPATIENT
Start: 2018-02-25 | End: 2018-02-28

## 2018-02-25 RX ORDER — FENTANYL CITRATE 50 UG/ML
25 INJECTION INTRAVENOUS ONCE
Qty: 0 | Refills: 0 | Status: DISCONTINUED | OUTPATIENT
Start: 2018-02-25 | End: 2018-02-25

## 2018-02-25 RX ORDER — METRONIDAZOLE 500 MG
500 TABLET ORAL ONCE
Qty: 0 | Refills: 0 | Status: COMPLETED | OUTPATIENT
Start: 2018-02-25 | End: 2018-02-25

## 2018-02-25 RX ORDER — METRONIDAZOLE 500 MG
500 TABLET ORAL EVERY 8 HOURS
Qty: 0 | Refills: 0 | Status: DISCONTINUED | OUTPATIENT
Start: 2018-02-25 | End: 2018-02-28

## 2018-02-25 RX ADMIN — Medication 200 GRAM(S): at 05:16

## 2018-02-25 RX ADMIN — MYCOPHENOLATE MOFETIL 83.5 GRAM(S): 250 CAPSULE ORAL at 10:10

## 2018-02-25 RX ADMIN — Medication 200 GRAM(S): at 18:01

## 2018-02-25 RX ADMIN — FENTANYL CITRATE 50 MICROGRAM(S): 50 INJECTION INTRAVENOUS at 08:50

## 2018-02-25 RX ADMIN — FENTANYL CITRATE 25 MICROGRAM(S): 50 INJECTION INTRAVENOUS at 11:50

## 2018-02-25 RX ADMIN — MYCOPHENOLATE MOFETIL 83.5 GRAM(S): 250 CAPSULE ORAL at 22:00

## 2018-02-25 RX ADMIN — Medication 250 MILLIGRAM(S): at 05:17

## 2018-02-25 RX ADMIN — MEROPENEM 100 MILLIGRAM(S): 1 INJECTION INTRAVENOUS at 06:00

## 2018-02-25 RX ADMIN — HYDROMORPHONE HYDROCHLORIDE 0.5 MILLIGRAM(S): 2 INJECTION INTRAMUSCULAR; INTRAVENOUS; SUBCUTANEOUS at 19:45

## 2018-02-25 RX ADMIN — FENTANYL CITRATE 50 MICROGRAM(S): 50 INJECTION INTRAVENOUS at 08:20

## 2018-02-25 RX ADMIN — FENTANYL CITRATE 25 MICROGRAM(S): 50 INJECTION INTRAVENOUS at 11:20

## 2018-02-25 RX ADMIN — Medication 100 MILLIGRAM(S): at 15:58

## 2018-02-25 RX ADMIN — TACROLIMUS 0.5 MILLIGRAM(S): 5 CAPSULE ORAL at 20:05

## 2018-02-25 RX ADMIN — Medication 250 MILLIGRAM(S): at 17:03

## 2018-02-25 RX ADMIN — FLUCONAZOLE 100 MILLIGRAM(S): 150 TABLET ORAL at 17:05

## 2018-02-25 RX ADMIN — FENTANYL CITRATE 25 MICROGRAM(S): 50 INJECTION INTRAVENOUS at 17:20

## 2018-02-25 RX ADMIN — MEROPENEM 100 MILLIGRAM(S): 1 INJECTION INTRAVENOUS at 18:01

## 2018-02-25 RX ADMIN — Medication 36 MILLIGRAM(S): at 18:02

## 2018-02-25 RX ADMIN — Medication 100 MILLIGRAM(S): at 22:00

## 2018-02-25 RX ADMIN — HYDROMORPHONE HYDROCHLORIDE 0.5 MILLIGRAM(S): 2 INJECTION INTRAMUSCULAR; INTRAVENOUS; SUBCUTANEOUS at 20:00

## 2018-02-25 RX ADMIN — FENTANYL CITRATE 25 MICROGRAM(S): 50 INJECTION INTRAVENOUS at 16:50

## 2018-02-25 RX ADMIN — PANTOPRAZOLE SODIUM 40 MILLIGRAM(S): 20 TABLET, DELAYED RELEASE ORAL at 12:01

## 2018-02-25 RX ADMIN — Medication 125 MILLIGRAM(S): at 05:17

## 2018-02-25 NOTE — PROGRESS NOTE ADULT - SUBJECTIVE AND OBJECTIVE BOX
CC: F/U for Leukocytosis    Saw/spoke to patient. Patient able to open eyes in response to verbal, but otherwise minimal response. Remains intubated. Had R SC line placed today. No overt fevers but low grade elevated temps. Per RN pressors decreasing.    Allergies  No Known Allergies    ANTIMICROBIALS:  fluconAZOLE IVPB    fluconAZOLE IVPB 200 every 24 hours  meropenem  IVPB    meropenem  IVPB 1000 every 12 hours  vancomycin  IVPB 1000 every 12 hours    PE:    Vital Signs Last 24 Hrs  T(C): 37 (25 Feb 2018 14:00), Max: 37.4 (24 Feb 2018 16:00)  T(F): 98.6 (25 Feb 2018 14:00), Max: 99.3 (24 Feb 2018 16:00)  HR: 99 (25 Feb 2018 14:30) (98 - 110)  BP: --  BP(mean): --  RR: 16 (25 Feb 2018 14:30) (0 - 32)  SpO2: 100% (25 Feb 2018 14:30) (98% - 100%)    Gen: AOx0-1, NAD, non-toxic  CV: S1+S2 normal, no murmurs, tachycardic, pericardial drains  Resp: Intubated, no crackles, no wheeze  Abd: Soft, nontender, +BS, OGT  Ext: No LE edema, no wounds  : No Rosas  IV/Skin: No thrombophlebitis, no rash  Lines: R SC CVC; L IJ CVC; L Femoral central line    LABS:                        9.0    47.0  )-----------( 147      ( 25 Feb 2018 14:17 )             27.8     02-25    135  |  94<L>  |  38<H>  ----------------------------<  124<H>  4.8   |  28  |  1.66<H>    Ca    7.9<L>      25 Feb 2018 14:17  Mg     1.8     02-25    TPro  6.5  /  Alb  3.2<L>  /  TBili  0.6  /  DBili  x   /  AST  74<H>  /  ALT  29  /  AlkPhos  58  02-25    MICROBIOLOGY:  Vancomycin Level, Trough: 17.2 ug/mL (02-25-18 @ 05:38)    Pericardial Pericardial Fluid  02-23-18   No growth to date.     .Blood Blood-Peripheral  02-15-18   No growth at 5 days.      .Blood Blood-Peripheral  02-15-18   No growth at 5 days.      .Blood Blood-Peripheral  02-09-18   No growth at 5 days.      .Blood Blood-Venous  02-09-18   No growth at 5 days.     CMV IgG Antibody: >10.00 U/mL (02-22-18 @ 23:45)  HIV-1 RNA Quantitative, Viral Load Log: NOT DET. lg /mL (02-02-18 @ 19:25)    RADIOLOGY:    2/25 CXR:    FINDINGS:  Left-sided IJ approach central line is visualized over the   brachiocephalic vein and is unchanged in position. There is a right-sided   subclavian central line with the tip overlying the region of right   subclavian vein. The side port of the enteric tube is at the level of the   gastroesophageal junction; advancement is advised. The remainder of the   live supporting devices are in good unchanged position.    Evaluation for right pneumothorax is limited by overlying pacer pad.   Bibasilar atelectasis. Cardiac silhouette is stable in size. The patient   is status post median sternotomy.

## 2018-02-25 NOTE — PROGRESS NOTE ADULT - SUBJECTIVE AND OBJECTIVE BOX
BILLY RUSSELL   MRN#: [id number]     The patient is a 67y Male who was seen, evaluated, & examined with the CTICU staff on rounds and later in the day with a multidisciplinary care plan formulated & implemented.  All available clinical, laboratory, radiographic, pharmacologic, and electrocardiographic data were reviewed & analyzed.      The patient was in the CTICU in critical condition secondary to acute hypoxic respiratory failure-persistent cardiopulmonary dysfunction, resolved hyperlactatemia-acidosis, stress hyperglycemia, and cardiogenic shock-cardiovascular dysfunction-S/P heart transplant with graft dysfunction.     Respiratory failure required full ventilatory support, the following of ABG’s with A-line monitoring, continuous pulse oximetry monitoring, inhaled Nitric oxide, transition from an IV Propofol infusion to Precedex drip, and an IV Lasix drip for support & to evaluate for & prevent further decompensation secondary to persistent cardiopulmonary dysfunction and cardiogenic shock-cardiovascular dysfunction-S/P heart transplant with graft dysfunction.      Invasive hemodynamic monitoring with an A-line was required for the following of continuous MAP/BP monitoring to ensure adequate cardiovascular support and to evaluate for & help prevent decompensation while receiving an IV Levophed drip, an IV Vasopressin drip, an IV Epinephrine drip, inhaled Nitric oxide, IV Primacor drip, an IABP, & external epicardial pacing on backup secondary to hemodynamically significant anemia/hypovolemia-shock, hemodynamically significant anemia/hypovolemia-shock, hyperlactatemia-acidosis, acute postoperative blood loss anemia, & cardiogenic shock-cardiovascular dysfunction-S/P heart transplant with graft dysfunction.    Metabolic stability, stress hyperglycemia, & infection prophylaxis required an IV regular Insulin drip & the following of serial glucose levels to help achieve & maintain euglycemia.      Patient required more than the usual postoperative critical care management and I provided 140 minutes of non-continuous care to the patient.  Discussed at length with the CTICU staff and helped coordinate care.

## 2018-02-25 NOTE — PROGRESS NOTE ADULT - ASSESSMENT
Mr. 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 He was admitted for probable pump thrombosis characterized by elevated LDH in the setting of being off anticoagulation for several months. 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 He was treated with APC of an AVM in the duodenum on 2/11 and required 6u blood transfusions. Subsequent cPRA 1 week after initial pRBCs was 0%.    He is POD #2 OHT, Blood group B. Given initial graft dysfunction and positive B-cell flow cytometry, he received 1 round of PP/IVIg. After review of the immunologic data, given the negative CDC crossmatch, this is more likely non-immune mediated graft dysfunction so the PP/IVIg will be stopped.    CMV D-/R+ and Toxo D-/R+. Has NORMAN due to hemodynamic compromise, but is now stable and slightly improved. Will wean pressors as tolerated with the goal to eliminate pressors ASAP. Continue milrinone 0.5 mcg/kg/min and do not taper Epinephrine until off Vaso and Levo for 24h.    Immunosuppression prophylaxis:  Tacrolimus - Will initiate 0.5 mg sublingual BID this evening at 20:00.  Continue CellCept 1000 mg IV BID. Dosing can continue at 10:00 and 22:00 given original plan.  Solumedrol - taper dose to 36 mg IV BID today.    Antimicrobial prophylaxis:  Intermediate risk CMV - Eventually will start IV ganciclovir or oral valganciclovir when more stable. No urgency.  Intermediate risk Toxo - no prophylaxis needed  PCP - delay start of Bactrim until renal function is stable and patient is extubated.  Broadened federica-operative coverage per Dr. Lujan: Fluconazole 200 mg IV daily, Meropenem 1 gm IV q12h, Vancomycin 1 gm IV q12h since he was an LVAD explant. Given massive elevation in WBC count, would prolong coverage, but be cautious with Vanco dosing given renal function.    Other prophylaxis:  Protonix 40 mg IV daily for GI prophylaxis while on steroids.    Critical Care Time = 120 minutes. Mr. 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 He was admitted for probable pump thrombosis characterized by elevated LDH in the setting of being off anticoagulation for several months. 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 He was treated with APC of an AVM in the duodenum on 2/11 and required 6u blood transfusions. Subsequent cPRA 1 week after initial pRBCs was 0%.    He is POD #2 OHT, Blood group B. Given initial graft dysfunction and positive B-cell flow cytometry, he received 1 round of PP/IVIg. After review of the immunologic data, given the negative CDC crossmatch, this is more likely non-immune mediated graft dysfunction so the PP/IVIg will be stopped.    CMV D-/R+ and Toxo D-/R+. Has NORMAN due to hemodynamic compromise, but is now stable and slightly improved. Will wean pressors as tolerated with the goal to eliminate pressors ASAP. Continue milrinone 0.5 mcg/kg/min and do not taper Epinephrine until off Vaso and Levo for 24h.    Immunosuppression prophylaxis:  Tacrolimus - Will initiate 0.5 mg sublingual BID this evening at 20:00.  Continue CellCept 1000 mg IV BID. Dosing can continue at 10:00 and 22:00 given original plan.  Solumedrol - taper dose to 36 mg IV at 20:00 dose x1 then start with taper schedule at 32 mg IV q12h tomorrow morning.  	  Antimicrobial prophylaxis:  Intermediate risk CMV - Eventually will start IV ganciclovir or oral valganciclovir when more stable. No urgency.  Intermediate risk Toxo - no prophylaxis needed  PCP - delay start of Bactrim until renal function is stable and patient is extubated.  Broadened federica-operative coverage per Dr. Lujan: Fluconazole 200 mg IV daily, Meropenem 1 gm IV q12h, Vancomycin 1 gm IV q12h since he was an LVAD explant. Given massive elevation in WBC count, would prolong coverage, but be cautious with Vanco dosing given renal function.    Other prophylaxis:  Protonix 40 mg IV daily for GI prophylaxis while on steroids.    Critical Care Time = 120 minutes.

## 2018-02-25 NOTE — PROGRESS NOTE ADULT - ASSESSMENT
66 yo man s/p LVAD placement, no infections related to the LVAD prior to undergoing an orthotopic heart transplant 2/23 for a reportedly high risk donor HCV. Patient on broad federica-operative prophylaxis to Vancomycin/Meropenem/Fluconazole based upon his prior LVAD placement and prolonged hospitalization pre-transplant. Intermediate risk for CMV (seropositive); intermediate risk for toxoplasmosis (seropositive R). Will need to start PPX when able to tolerate PO, stabilization of Cr. Suspect leukocytosis is reactive, not indicative of infection; however patient high risk, prolonged hospitalization, LVAD; reasonable to continue PPX. Leukocytosis, S/p cardiac transplant, CMV/toxo serology positive.  - Continue post operative Vanco/Isabel/Fluconazole (Would continue in setting of profound leukocytosis; monitor vanco levels closely)  - Bactrim (PCP PPX)/Valcyte (CMV PPX) to be added when Cr improved, able to tolerate PO  - Will need to follow up on donor serologies for CMV, HCV viral load and genotype  - Trend WBC, monitor temperatures    Sander Liu MD  Pager [phone]  After 5pm and on weekends call [phone]

## 2018-02-25 NOTE — PROGRESS NOTE ADULT - SUBJECTIVE AND OBJECTIVE BOX
Interval History: no overnight events. Stabilized on current regimen. Some reduction in pressor requirement. Excellent urine output.  There appears to be clot in both internal jugular systems. New access obtained this morning in right subclavian.    Medications:  acetaminophen  IVPB. 1000 milliGRAM(s) IV Intermittent once  amiodarone Infusion 1 mG/Min IV Continuous <Continuous>  diphenhydrAMINE   Injectable 50 milliGRAM(s) IV Push once  docusate sodium 100 milliGRAM(s) Oral three times a day  EPINEPHrine 8 milliGRAM(s),sodium chloride 250 milliLiter(s) 8 milliGRAM(s) IV Continuous <Continuous>  fentaNYL    Injectable 50 MICROGram(s) IV Push once  fluconAZOLE IVPB 200 milliGRAM(s) IV Intermittent every 24 hours  furosemide Infusion 20 mG/Hr IV Continuous <Continuous>  immune globulin gamma IVPB 8 Gram(s) IV Intermittent once  insulin Infusion 30 Unit(s)/Hr IV Continuous <Continuous>  meperidine     Injectable 25 milliGRAM(s) IV Push once PRN     meropenem  IVPB 1000 milliGRAM(s) IV Intermittent every 12 hours  methylPREDNISolone sodium succinate Injectable 125 milliGRAM(s) IV Push every 8 hours  milrinone Infusion 0.5 MICROgram(s)/kG/Min IV Continuous <Continuous>  mycophenolate mofetil IVPB 1 Gram(s) IV Intermittent <User Schedule>  norepinephrine Infusion 0.15 MICROgram(s)/kG/Min IV Continuous <Continuous>  pantoprazole  Injectable 40 milliGRAM(s) IV Push daily  propofol Infusion 10 MICROgram(s)/kG/Min IV Continuous <Continuous>  sodium chloride 0.9%. 1000 milliLiter(s) IV Continuous <Continuous>  vancomycin  IVPB 1000 milliGRAM(s) IV Intermittent every 12 hours  vasopressin Infusion 0.1 Unit(s)/Min IV Continuous <Continuous>    Vitals:  T(C): 37.4 (18 @ 03:00), Max: 37.4 (18 @ 16:00)  HR: 101 (18 @ 08:11) (101 - 110)  ABP: 112/55 (18 @ 07:00) (77/56 - 188/188)  ABP(mean): 79 (18 @ 07:00) (65 - 188)  RR: 16 (18 @ 07:00) (0 - 32)  SpO2: 100% (18 @ 08:11) (98% - 100%)      Daily     Daily Weight in k.8 (2018 00:00)      I&O's Summary    2018 07:01  -  2018 07:00  --------------------------------------------------------  IN: 3087.3 mL / OUT: 7480 mL / NET: -4392.7 mL      Physical Exam:  HEENT: intubated & sedated  Cardiovascular: tachy, regular, normal S1 S2, No murmurs/rubs/gallops  Respiratory: Clear to auscultation anterolaterally  Gastrointestinal: Soft, Non-tender, non-distended	  Skin: no skin lesions  Neurologic: Non-focal  Extremities: No LE edema, warm and well perfused      Labs:                        9.7    46.4  )-----------( 168      ( 2018 01:23 )             29.1         138  |  96  |  28<H>  ----------------------------<  167<H>  4.6   |  27  |  1.59<H>    Ca    8.5      2018 01:24  Mg     1.8         TPro  6.7  /  Alb  3.4  /  TBili  0.7  /  DBili  x   /  AST  91<H>  /  ALT  34  /  AlkPhos  55        PT/INR - ( 2018 01:24 )   PT: 12.9 sec;   INR: 1.18 ratio    PTT - ( 2018 01:24 )  PTT:26.6 sec    Central saturation 62-66%    Lactate Dehydrogenase, Serum: 550 U/L ( @ 01:24)  Lactate Dehydrogenase, Serum: 453 U/L ( @ 01:30)      TELEMETRY: Sinus tach 100-110

## 2018-02-26 LAB
ALBUMIN SERPL ELPH-MCNC: 3 G/DL — LOW (ref 3.3–5)
ALP SERPL-CCNC: 62 U/L — SIGNIFICANT CHANGE UP (ref 40–120)
ALT FLD-CCNC: 26 U/L RC — SIGNIFICANT CHANGE UP (ref 10–45)
ANION GAP SERPL CALC-SCNC: 12 MMOL/L — SIGNIFICANT CHANGE UP (ref 5–17)
ANION GAP SERPL CALC-SCNC: 14 MMOL/L — SIGNIFICANT CHANGE UP (ref 5–17)
APTT BLD: 24.7 SEC — LOW (ref 27.5–37.4)
AST SERPL-CCNC: 55 U/L — HIGH (ref 10–40)
AT III ACT/NOR PPP CHRO: 72 % — LOW (ref 85–135)
BASE EXCESS BLDMV CALC-SCNC: 5.2 MMOL/L — HIGH (ref -3–3)
BASE EXCESS BLDMV CALC-SCNC: 5.8 MMOL/L — HIGH (ref -3–3)
BASE EXCESS BLDMV CALC-SCNC: 6.4 MMOL/L — HIGH (ref -3–3)
BASE EXCESS BLDMV CALC-SCNC: 6.4 MMOL/L — HIGH (ref -3–3)
BASE EXCESS BLDMV CALC-SCNC: 6.8 MMOL/L — HIGH (ref -3–3)
BASE EXCESS BLDMV CALC-SCNC: 8.2 MMOL/L — HIGH (ref -3–3)
BASE EXCESS BLDV CALC-SCNC: 4.6 MMOL/L — HIGH (ref -2–2)
BASE EXCESS BLDV CALC-SCNC: 4.6 MMOL/L — HIGH (ref -2–2)
BASE EXCESS BLDV CALC-SCNC: 4.7 MMOL/L — HIGH (ref -2–2)
BASE EXCESS BLDV CALC-SCNC: 5.4 MMOL/L — HIGH (ref -2–2)
BASE EXCESS BLDV CALC-SCNC: 5.4 MMOL/L — HIGH (ref -2–2)
BASE EXCESS BLDV CALC-SCNC: 5.6 MMOL/L — HIGH (ref -2–2)
BASE EXCESS BLDV CALC-SCNC: 5.8 MMOL/L — HIGH (ref -2–2)
BASE EXCESS BLDV CALC-SCNC: 5.8 MMOL/L — HIGH (ref -2–2)
BASE EXCESS BLDV CALC-SCNC: 5.9 MMOL/L — HIGH (ref -2–2)
BASE EXCESS BLDV CALC-SCNC: 6 MMOL/L — HIGH (ref -2–2)
BILIRUB SERPL-MCNC: 0.5 MG/DL — SIGNIFICANT CHANGE UP (ref 0.2–1.2)
BLD GP AB SCN SERPL QL: NEGATIVE — SIGNIFICANT CHANGE UP
BUN SERPL-MCNC: 42 MG/DL — HIGH (ref 7–23)
BUN SERPL-MCNC: 42 MG/DL — HIGH (ref 7–23)
CALCIUM SERPL-MCNC: 8.1 MG/DL — LOW (ref 8.4–10.5)
CALCIUM SERPL-MCNC: 8.1 MG/DL — LOW (ref 8.4–10.5)
CHLORIDE SERPL-SCNC: 93 MMOL/L — LOW (ref 96–108)
CHLORIDE SERPL-SCNC: 94 MMOL/L — LOW (ref 96–108)
CO2 BLDMV-SCNC: 30 MMOL/L — HIGH (ref 21–29)
CO2 BLDMV-SCNC: 31 MMOL/L — HIGH (ref 21–29)
CO2 BLDMV-SCNC: 32 MMOL/L — HIGH (ref 21–29)
CO2 BLDMV-SCNC: 34 MMOL/L — HIGH (ref 21–29)
CO2 BLDV-SCNC: 30 MMOL/L — SIGNIFICANT CHANGE UP (ref 22–30)
CO2 BLDV-SCNC: 31 MMOL/L — HIGH (ref 22–30)
CO2 BLDV-SCNC: 32 MMOL/L — HIGH (ref 22–30)
CO2 SERPL-SCNC: 27 MMOL/L — SIGNIFICANT CHANGE UP (ref 22–31)
CO2 SERPL-SCNC: 30 MMOL/L — SIGNIFICANT CHANGE UP (ref 22–31)
CREAT SERPL-MCNC: 1.48 MG/DL — HIGH (ref 0.5–1.3)
CREAT SERPL-MCNC: 1.64 MG/DL — HIGH (ref 0.5–1.3)
DRVVT SCREEN TO CONFIRM RATIO: SIGNIFICANT CHANGE UP
GAS PNL BLDA: SIGNIFICANT CHANGE UP
GAS PNL BLDMV: SIGNIFICANT CHANGE UP
GAS PNL BLDV: SIGNIFICANT CHANGE UP
GLUCOSE BLDC GLUCOMTR-MCNC: 113 MG/DL — HIGH (ref 70–99)
GLUCOSE BLDC GLUCOMTR-MCNC: 130 MG/DL — HIGH (ref 70–99)
GLUCOSE BLDC GLUCOMTR-MCNC: 131 MG/DL — HIGH (ref 70–99)
GLUCOSE BLDC GLUCOMTR-MCNC: 132 MG/DL — HIGH (ref 70–99)
GLUCOSE BLDC GLUCOMTR-MCNC: 136 MG/DL — HIGH (ref 70–99)
GLUCOSE BLDC GLUCOMTR-MCNC: 142 MG/DL — HIGH (ref 70–99)
GLUCOSE BLDC GLUCOMTR-MCNC: 149 MG/DL — HIGH (ref 70–99)
GLUCOSE SERPL-MCNC: 121 MG/DL — HIGH (ref 70–99)
GLUCOSE SERPL-MCNC: 137 MG/DL — HIGH (ref 70–99)
HCO3 BLDMV-SCNC: 29 MMOL/L — HIGH (ref 20–28)
HCO3 BLDMV-SCNC: 30 MMOL/L — HIGH (ref 20–28)
HCO3 BLDMV-SCNC: 30 MMOL/L — HIGH (ref 20–28)
HCO3 BLDMV-SCNC: 31 MMOL/L — HIGH (ref 20–28)
HCO3 BLDMV-SCNC: 31 MMOL/L — HIGH (ref 20–28)
HCO3 BLDMV-SCNC: 33 MMOL/L — HIGH (ref 20–28)
HCO3 BLDV-SCNC: 29 MMOL/L — SIGNIFICANT CHANGE UP (ref 21–29)
HCO3 BLDV-SCNC: 30 MMOL/L — HIGH (ref 21–29)
HCT VFR BLD CALC: 25.9 % — LOW (ref 39–50)
HCT VFR BLD CALC: 31.2 % — LOW (ref 39–50)
HGB BLD-MCNC: 10.4 G/DL — LOW (ref 13–17)
HGB BLD-MCNC: 8.7 G/DL — LOW (ref 13–17)
HOROWITZ INDEX BLDMV+IHG-RTO: 50 — SIGNIFICANT CHANGE UP
HOROWITZ INDEX BLDV+IHG-RTO: 50 — SIGNIFICANT CHANGE UP
HOROWITZ INDEX BLDV+IHG-RTO: 60 — SIGNIFICANT CHANGE UP
INR BLD: 1.06 RATIO — SIGNIFICANT CHANGE UP (ref 0.88–1.16)
LA NT DPL PPP QL: 34.9 SEC — SIGNIFICANT CHANGE UP
LDH SERPL L TO P-CCNC: 500 U/L — HIGH (ref 50–242)
MAGNESIUM SERPL-MCNC: 2 MG/DL — SIGNIFICANT CHANGE UP (ref 1.6–2.6)
MCHC RBC-ENTMCNC: 31.4 PG — SIGNIFICANT CHANGE UP (ref 27–34)
MCHC RBC-ENTMCNC: 31.7 PG — SIGNIFICANT CHANGE UP (ref 27–34)
MCHC RBC-ENTMCNC: 33.4 GM/DL — SIGNIFICANT CHANGE UP (ref 32–36)
MCHC RBC-ENTMCNC: 33.5 GM/DL — SIGNIFICANT CHANGE UP (ref 32–36)
MCV RBC AUTO: 93.9 FL — SIGNIFICANT CHANGE UP (ref 80–100)
MCV RBC AUTO: 94.7 FL — SIGNIFICANT CHANGE UP (ref 80–100)
NT-PROBNP SERPL-SCNC: 9103 PG/ML — HIGH (ref 0–300)
O2 CT VFR BLD CALC: 30 MMHG — SIGNIFICANT CHANGE UP (ref 30–65)
O2 CT VFR BLD CALC: 32 MMHG — SIGNIFICANT CHANGE UP (ref 30–65)
O2 CT VFR BLD CALC: 34 MMHG — SIGNIFICANT CHANGE UP (ref 30–65)
O2 CT VFR BLD CALC: 38 MMHG — SIGNIFICANT CHANGE UP (ref 30–65)
O2 CT VFR BLD CALC: 39 MMHG — SIGNIFICANT CHANGE UP (ref 30–65)
O2 CT VFR BLD CALC: 39 MMHG — SIGNIFICANT CHANGE UP (ref 30–65)
PCO2 BLDMV: 42 MMHG — SIGNIFICANT CHANGE UP (ref 30–65)
PCO2 BLDMV: 44 MMHG — SIGNIFICANT CHANGE UP (ref 30–65)
PCO2 BLDMV: 47 MMHG — SIGNIFICANT CHANGE UP (ref 30–65)
PCO2 BLDMV: 48 MMHG — SIGNIFICANT CHANGE UP (ref 30–65)
PCO2 BLDV: 44 MMHG — SIGNIFICANT CHANGE UP (ref 35–50)
PCO2 BLDV: 45 MMHG — SIGNIFICANT CHANGE UP (ref 35–50)
PCO2 BLDV: 46 MMHG — SIGNIFICANT CHANGE UP (ref 35–50)
PCO2 BLDV: 46 MMHG — SIGNIFICANT CHANGE UP (ref 35–50)
PH BLDMV: 7.43 — SIGNIFICANT CHANGE UP (ref 7.32–7.45)
PH BLDMV: 7.45 — SIGNIFICANT CHANGE UP (ref 7.32–7.45)
PH BLDMV: 7.45 — SIGNIFICANT CHANGE UP (ref 7.32–7.45)
PH BLDMV: 7.46 — HIGH (ref 7.32–7.45)
PH BLDV: 7.42 — SIGNIFICANT CHANGE UP (ref 7.35–7.45)
PH BLDV: 7.42 — SIGNIFICANT CHANGE UP (ref 7.35–7.45)
PH BLDV: 7.44 — SIGNIFICANT CHANGE UP (ref 7.35–7.45)
PH BLDV: 7.45 — SIGNIFICANT CHANGE UP (ref 7.35–7.45)
PH BLDV: 7.45 — SIGNIFICANT CHANGE UP (ref 7.35–7.45)
PHOSPHATE SERPL-MCNC: 5.1 MG/DL — HIGH (ref 2.5–4.5)
PLATELET # BLD AUTO: 143 K/UL — LOW (ref 150–400)
PLATELET # BLD AUTO: 144 K/UL — LOW (ref 150–400)
PO2 BLDV: 36 MMHG — SIGNIFICANT CHANGE UP (ref 25–45)
PO2 BLDV: 36 MMHG — SIGNIFICANT CHANGE UP (ref 25–45)
PO2 BLDV: 39 MMHG — SIGNIFICANT CHANGE UP (ref 25–45)
PO2 BLDV: 40 MMHG — SIGNIFICANT CHANGE UP (ref 25–45)
PO2 BLDV: 41 MMHG — SIGNIFICANT CHANGE UP (ref 25–45)
PO2 BLDV: 43 MMHG — SIGNIFICANT CHANGE UP (ref 25–45)
POTASSIUM SERPL-MCNC: 4.1 MMOL/L — SIGNIFICANT CHANGE UP (ref 3.5–5.3)
POTASSIUM SERPL-MCNC: 4.3 MMOL/L — SIGNIFICANT CHANGE UP (ref 3.5–5.3)
POTASSIUM SERPL-SCNC: 4.1 MMOL/L — SIGNIFICANT CHANGE UP (ref 3.5–5.3)
POTASSIUM SERPL-SCNC: 4.3 MMOL/L — SIGNIFICANT CHANGE UP (ref 3.5–5.3)
PROT C ACT/NOR PPP: 62 % — LOW (ref 74–150)
PROT S FREE AG PPP IA-ACNC: 50 % — LOW (ref 67–141)
PROT SERPL-MCNC: 6.3 G/DL — SIGNIFICANT CHANGE UP (ref 6–8.3)
PROTHROM AB SERPL-ACNC: 11.6 SEC — SIGNIFICANT CHANGE UP (ref 9.8–12.7)
RBC # BLD: 2.74 M/UL — LOW (ref 4.2–5.8)
RBC # BLD: 3.33 M/UL — LOW (ref 4.2–5.8)
RBC # FLD: 15.3 % — HIGH (ref 10.3–14.5)
RBC # FLD: 16.1 % — HIGH (ref 10.3–14.5)
RH IG SCN BLD-IMP: POSITIVE — SIGNIFICANT CHANGE UP
SAO2 % BLDMV: 50 % — LOW (ref 60–90)
SAO2 % BLDMV: 52 % — LOW (ref 60–90)
SAO2 % BLDMV: 59 % — LOW (ref 60–90)
SAO2 % BLDMV: 64 % — SIGNIFICANT CHANGE UP (ref 60–90)
SAO2 % BLDMV: 66 % — SIGNIFICANT CHANGE UP (ref 60–90)
SAO2 % BLDMV: 66 % — SIGNIFICANT CHANGE UP (ref 60–90)
SAO2 % BLDV: 59 % — LOW (ref 67–88)
SAO2 % BLDV: 60 % — LOW (ref 67–88)
SAO2 % BLDV: 64 % — LOW (ref 67–88)
SAO2 % BLDV: 68 % — SIGNIFICANT CHANGE UP (ref 67–88)
SAO2 % BLDV: 68 % — SIGNIFICANT CHANGE UP (ref 67–88)
SAO2 % BLDV: 69 % — SIGNIFICANT CHANGE UP (ref 67–88)
SAO2 % BLDV: 70 % — SIGNIFICANT CHANGE UP (ref 67–88)
SAO2 % BLDV: 70 % — SIGNIFICANT CHANGE UP (ref 67–88)
SAO2 % BLDV: 71 % — SIGNIFICANT CHANGE UP (ref 67–88)
SAO2 % BLDV: 72 % — SIGNIFICANT CHANGE UP (ref 67–88)
SODIUM SERPL-SCNC: 134 MMOL/L — LOW (ref 135–145)
SODIUM SERPL-SCNC: 136 MMOL/L — SIGNIFICANT CHANGE UP (ref 135–145)
TACROLIMUS SERPL-MCNC: <2 NG/ML — SIGNIFICANT CHANGE UP
VANCOMYCIN TROUGH SERPL-MCNC: 19.1 UG/ML — SIGNIFICANT CHANGE UP (ref 10–20)
WBC # BLD: 35.6 K/UL — HIGH (ref 3.8–10.5)
WBC # BLD: 43.3 K/UL — CRITICAL HIGH (ref 3.8–10.5)
WBC # FLD AUTO: 35.6 K/UL — HIGH (ref 3.8–10.5)
WBC # FLD AUTO: 43.3 K/UL — CRITICAL HIGH (ref 3.8–10.5)

## 2018-02-26 PROCEDURE — 93970 EXTREMITY STUDY: CPT | Mod: 26

## 2018-02-26 PROCEDURE — 36514 APHERESIS PLASMA: CPT

## 2018-02-26 PROCEDURE — 76937 US GUIDE VASCULAR ACCESS: CPT | Mod: 26

## 2018-02-26 PROCEDURE — 99233 SBSQ HOSP IP/OBS HIGH 50: CPT | Mod: 25

## 2018-02-26 PROCEDURE — 99291 CRITICAL CARE FIRST HOUR: CPT

## 2018-02-26 PROCEDURE — 93451 RIGHT HEART CATH: CPT | Mod: 26

## 2018-02-26 PROCEDURE — 71045 X-RAY EXAM CHEST 1 VIEW: CPT | Mod: 26

## 2018-02-26 PROCEDURE — 99292 CRITICAL CARE ADDL 30 MIN: CPT

## 2018-02-26 PROCEDURE — 99233 SBSQ HOSP IP/OBS HIGH 50: CPT

## 2018-02-26 PROCEDURE — 71045 X-RAY EXAM CHEST 1 VIEW: CPT | Mod: 26,77

## 2018-02-26 PROCEDURE — 99232 SBSQ HOSP IP/OBS MODERATE 35: CPT

## 2018-02-26 PROCEDURE — 93306 TTE W/DOPPLER COMPLETE: CPT | Mod: 26

## 2018-02-26 RX ORDER — NOREPINEPHRINE BITARTRATE/D5W 8 MG/250ML
0.01 PLASTIC BAG, INJECTION (ML) INTRAVENOUS
Qty: 8 | Refills: 0 | Status: DISCONTINUED | OUTPATIENT
Start: 2018-02-26 | End: 2018-02-26

## 2018-02-26 RX ORDER — PROPOFOL 10 MG/ML
10.72 INJECTION, EMULSION INTRAVENOUS
Qty: 500 | Refills: 0 | Status: DISCONTINUED | OUTPATIENT
Start: 2018-02-25 | End: 2018-02-26

## 2018-02-26 RX ORDER — DOBUTAMINE HCL 250MG/20ML
4 VIAL (ML) INTRAVENOUS
Qty: 500 | Refills: 0 | Status: DISCONTINUED | OUTPATIENT
Start: 2018-02-26 | End: 2018-03-01

## 2018-02-26 RX ORDER — TACROLIMUS 5 MG/1
1 CAPSULE ORAL EVERY 12 HOURS
Qty: 0 | Refills: 0 | Status: DISCONTINUED | OUTPATIENT
Start: 2018-02-26 | End: 2018-02-27

## 2018-02-26 RX ORDER — POTASSIUM CHLORIDE 20 MEQ
10 PACKET (EA) ORAL ONCE
Qty: 0 | Refills: 0 | Status: COMPLETED | OUTPATIENT
Start: 2018-02-26 | End: 2018-02-26

## 2018-02-26 RX ORDER — CALCIUM GLUCONATE 100 MG/ML
1 VIAL (ML) INTRAVENOUS ONCE
Qty: 0 | Refills: 0 | Status: COMPLETED | OUTPATIENT
Start: 2018-02-26 | End: 2018-02-26

## 2018-02-26 RX ORDER — HYDROMORPHONE HYDROCHLORIDE 2 MG/ML
0.5 INJECTION INTRAMUSCULAR; INTRAVENOUS; SUBCUTANEOUS ONCE
Qty: 0 | Refills: 0 | Status: DISCONTINUED | OUTPATIENT
Start: 2018-02-26 | End: 2018-02-26

## 2018-02-26 RX ORDER — MAGNESIUM SULFATE 500 MG/ML
2 VIAL (ML) INJECTION ONCE
Qty: 0 | Refills: 0 | Status: COMPLETED | OUTPATIENT
Start: 2018-02-26 | End: 2018-02-26

## 2018-02-26 RX ORDER — HYDROMORPHONE HYDROCHLORIDE 2 MG/ML
0.5 INJECTION INTRAMUSCULAR; INTRAVENOUS; SUBCUTANEOUS EVERY 8 HOURS
Qty: 0 | Refills: 0 | Status: DISCONTINUED | OUTPATIENT
Start: 2018-02-26 | End: 2018-02-27

## 2018-02-26 RX ADMIN — Medication 400 MILLIGRAM(S): at 08:51

## 2018-02-26 RX ADMIN — HYDROMORPHONE HYDROCHLORIDE 0.5 MILLIGRAM(S): 2 INJECTION INTRAMUSCULAR; INTRAVENOUS; SUBCUTANEOUS at 10:10

## 2018-02-26 RX ADMIN — TACROLIMUS 1 MILLIGRAM(S): 5 CAPSULE ORAL at 20:19

## 2018-02-26 RX ADMIN — HYDROMORPHONE HYDROCHLORIDE 0.5 MILLIGRAM(S): 2 INJECTION INTRAMUSCULAR; INTRAVENOUS; SUBCUTANEOUS at 10:25

## 2018-02-26 RX ADMIN — VASOPRESSIN 6 UNIT(S)/MIN: 20 INJECTION INTRAVENOUS at 07:00

## 2018-02-26 RX ADMIN — Medication 6.99 MICROGRAM(S)/KG/MIN: at 16:56

## 2018-02-26 RX ADMIN — HYDROMORPHONE HYDROCHLORIDE 0.5 MILLIGRAM(S): 2 INJECTION INTRAMUSCULAR; INTRAVENOUS; SUBCUTANEOUS at 13:15

## 2018-02-26 RX ADMIN — MYCOPHENOLATE MOFETIL 83.5 GRAM(S): 250 CAPSULE ORAL at 10:03

## 2018-02-26 RX ADMIN — Medication 36 MILLIGRAM(S): at 05:51

## 2018-02-26 RX ADMIN — TACROLIMUS 0.5 MILLIGRAM(S): 5 CAPSULE ORAL at 08:08

## 2018-02-26 RX ADMIN — MEROPENEM 100 MILLIGRAM(S): 1 INJECTION INTRAVENOUS at 05:51

## 2018-02-26 RX ADMIN — HYDROMORPHONE HYDROCHLORIDE 0.5 MILLIGRAM(S): 2 INJECTION INTRAMUSCULAR; INTRAVENOUS; SUBCUTANEOUS at 22:15

## 2018-02-26 RX ADMIN — INSULIN HUMAN 30 UNIT(S)/HR: 100 INJECTION, SOLUTION SUBCUTANEOUS at 07:00

## 2018-02-26 RX ADMIN — Medication 50 GRAM(S): at 11:00

## 2018-02-26 RX ADMIN — Medication 200 GRAM(S): at 00:51

## 2018-02-26 RX ADMIN — HYDROMORPHONE HYDROCHLORIDE 0.5 MILLIGRAM(S): 2 INJECTION INTRAMUSCULAR; INTRAVENOUS; SUBCUTANEOUS at 13:30

## 2018-02-26 RX ADMIN — Medication 250 MILLIGRAM(S): at 05:27

## 2018-02-26 RX ADMIN — MYCOPHENOLATE MOFETIL 83.5 GRAM(S): 250 CAPSULE ORAL at 22:30

## 2018-02-26 RX ADMIN — Medication 200 GRAM(S): at 08:45

## 2018-02-26 RX ADMIN — Medication 100 MILLIGRAM(S): at 21:20

## 2018-02-26 RX ADMIN — Medication 50 MILLIGRAM(S): at 08:51

## 2018-02-26 RX ADMIN — HYDROMORPHONE HYDROCHLORIDE 0.5 MILLIGRAM(S): 2 INJECTION INTRAMUSCULAR; INTRAVENOUS; SUBCUTANEOUS at 22:00

## 2018-02-26 RX ADMIN — PROPOFOL 5 MICROGRAM(S)/KG/MIN: 10 INJECTION, EMULSION INTRAVENOUS at 07:00

## 2018-02-26 RX ADMIN — Medication 32 MILLIGRAM(S): at 18:29

## 2018-02-26 RX ADMIN — AMIODARONE HYDROCHLORIDE 16.67 MG/MIN: 400 TABLET ORAL at 07:15

## 2018-02-26 RX ADMIN — Medication 2.5 MG/HR: at 16:56

## 2018-02-26 RX ADMIN — Medication 100 MILLIGRAM(S): at 14:16

## 2018-02-26 RX ADMIN — Medication 50 MILLIEQUIVALENT(S): at 10:26

## 2018-02-26 RX ADMIN — Medication 7.5 MG/HR: at 07:00

## 2018-02-26 RX ADMIN — MILRINONE LACTATE 6.99 MICROGRAM(S)/KG/MIN: 1 INJECTION, SOLUTION INTRAVENOUS at 07:00

## 2018-02-26 RX ADMIN — MEROPENEM 100 MILLIGRAM(S): 1 INJECTION INTRAVENOUS at 17:11

## 2018-02-26 RX ADMIN — Medication 100 MILLIGRAM(S): at 06:11

## 2018-02-26 RX ADMIN — FLUCONAZOLE 100 MILLIGRAM(S): 150 TABLET ORAL at 17:11

## 2018-02-26 RX ADMIN — PANTOPRAZOLE SODIUM 40 MILLIGRAM(S): 20 TABLET, DELAYED RELEASE ORAL at 14:14

## 2018-02-26 RX ADMIN — DEXMEDETOMIDINE HYDROCHLORIDE IN 0.9% SODIUM CHLORIDE 9.71 MICROGRAM(S)/KG/HR: 4 INJECTION INTRAVENOUS at 16:00

## 2018-02-26 RX ADMIN — Medication 250 MILLIGRAM(S): at 19:30

## 2018-02-26 NOTE — PROGRESS NOTE ADULT - NSHPATTENDINGPLANDISCUSS_GEN_ALL_CORE
CTU Intensivist & PA, Manohar Parker & Otis, and Dr. Lujan CTU Intensivist & PA, Manohar Parker & Zeynep, and Dr. Lujan

## 2018-02-26 NOTE — PROGRESS NOTE ADULT - SUBJECTIVE AND OBJECTIVE BOX
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 He was admitted for probable pump thrombosis characterized by elevated LDH in the setting of being off anticoagulation for several months. 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 He was treated with APC of an AVM in the duodenum on 2/11 requiring transfusion of 6 PRBCs units. He is S/P OHT in early hours of 2/23. S/P therapeutic plasma exchange (TPE) followed by IVIG on 2/23 for graft dysfunction and concern for antibody mediated rejection.      Interval events: POD#3 s/p OHT, Continues on pressors. Good urine output. Started on prograf last night.       PAST MEDICAL & SURGICAL HISTORY:  GIB (gastrointestinal bleeding)  Ventricular fibrillation: s/p AICD  PAF (paroxysmal atrial fibrillation): on xarelto  Non-Ischemic Cardiomyopathy  SVT (Supraventricular Tachycardia)  HTN  CHF (Congestive Heart Failure)  LVAD (left ventricular assist device) present  Status post left hip replacement  History of Prior Ablation Treatment: for afib  AICD (Automatic Cardioverter/Defibrillator) Present: [employer] with 1 [employer] lead4/1/09- explanted and replaced with [employer] 2 leads on 9/2/09      MEDICATIONS  (STANDING):  amiodarone Infusion 0.5 mG/Min (16.667 mL/Hr) IV Continuous <Continuous>  dexmedetomidine Infusion 0.5 MICROgram(s)/kG/Hr (9.713 mL/Hr) IV Continuous <Continuous>  DOBUTamine Infusion 2 MICROgram(s)/kG/Min (4.662 mL/Hr) IV Continuous <Continuous>  docusate sodium 100 milliGRAM(s) Oral three times a day  EPINEPHrine 8 milliGRAM(s),sodium chloride 250 milliLiter(s) 8 milliGRAM(s) (20.4 mL/Hr) IV Continuous <Continuous>  fluconAZOLE IVPB      fluconAZOLE IVPB 200 milliGRAM(s) IV Intermittent every 24 hours  furosemide Infusion 10 mG/Hr (5 mL/Hr) IV Continuous <Continuous>  HYDROmorphone  Injectable 0.5 milliGRAM(s) IV Push once  immune globulin gamma IVPB 8 Gram(s) IV Intermittent once  insulin Infusion 30 Unit(s)/Hr (30 mL/Hr) IV Continuous <Continuous>  meropenem  IVPB      meropenem  IVPB 1000 milliGRAM(s) IV Intermittent every 12 hours  methylPREDNISolone sodium succinate Injectable 32 milliGRAM(s) IV Push every 12 hours  metroNIDAZOLE  IVPB      metroNIDAZOLE  IVPB 500 milliGRAM(s) IV Intermittent every 8 hours  milrinone Infusion 0.3 MICROgram(s)/kG/Min (6.993 mL/Hr) IV Continuous <Continuous>  mycophenolate mofetil IVPB 1 Gram(s) IV Intermittent <User Schedule>  norepinephrine Infusion 0.014 MICROgram(s)/kG/Min (2 mL/Hr) IV Continuous <Continuous>  pantoprazole  Injectable 40 milliGRAM(s) IV Push daily  propofol Infusion 10.725 MICROgram(s)/kG/Min (5 mL/Hr) IV Continuous <Continuous>  sodium chloride 0.9%. 1000 milliLiter(s) (10 mL/Hr) IV Continuous <Continuous>  tacrolimus 0.5 milliGRAM(s) Oral every 12 hours  vancomycin  IVPB 1000 milliGRAM(s) IV Intermittent every 12 hours  vasopressin Infusion 0.1 Unit(s)/Min (6 mL/Hr) IV Continuous <Continuous>      Allergies  No Known Allergies      Vital Signs Last 24 Hrs  T(C): 37.2 (26 Feb 2018 11:00), Max: 37.3 (25 Feb 2018 23:00)  T(F): 99 (26 Feb 2018 11:00), Max: 99.1 (25 Feb 2018 23:00)  HR: 104 (26 Feb 2018 11:15) (93 - 120)  RR: 16 (26 Feb 2018 11:15) (13 - 26)  SpO2: 100% (26 Feb 2018 11:15) (96% - 100%)      PHYSICAL EXAM:  General: Intubated  Eyes: Anicteric  Respiratory: CTA anterolaterally  CV: S1, S2, tachycardic  Abdominal: Soft, ND, +BS  Musculoskeletal/Extremities: No atrophy, No LE edema  Neurology: Able to open eyes on verbal command                           8.7    43.3  )-----------( 143      ( 26 Feb 2018 02:14 )             25.9       02-26    134<L>  |  93<L>  |  42<H>  ----------------------------<  121<H>  4.3   |  27  |  1.64<H>    Ca    8.1<L>      26 Feb 2018 02:14  Ionized Ca  1.07	     26 Feb 2018 08:06  Phos  5.1     02-26  Mg     2.0     02-26    TPro  6.3  /  Alb  3.0<L>  /  TBili  0.5  /  DBili  x   /  AST  55<H>  /  ALT  26  /  AlkPhos  62  02-26    Lactate Dehydrogenase, Serum: 500 U/L (02-26 @ 02:14)  PT/INR - ( 25 Feb 2018 01:24 )   PT: 12.9 sec;   INR: 1.18 ratio    PTT - ( 25 Feb 2018 01:24 )  PTT:26.6 sec

## 2018-02-26 NOTE — PROGRESS NOTE ADULT - ASSESSMENT
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 He was admitted for probable pump thrombosis characterized by elevated LDH in the setting of being off anticoagulation for several months. 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 He was treated with APC of an AVM in the duodenum on 2/11 requiring transfusion of 6 PRBCs units.     He is S/P OHT in early hours of 2/23. Given initial graft dysfunction and positive B-cell flow, he received TPE followed by IVIG on 2/23. Given the negative CDC crossmatch, process more likely to be non-immune mediated, however transplant team wants to proceed with TPE today to potentially remove inflammatory mediators implicated in graft dysfunction. We will proceed with TPE #2 today using femoral venous access, with plasma as replacement solution, maintaining a 95% fluid balance.

## 2018-02-26 NOTE — PROGRESS NOTE ADULT - ASSESSMENT
68 yo man s/p LVAD placement, no infections related to the LVAD prior to undergoing an orthotopic heart transplant 2/23 for a reportedly high risk donor HCV. Patient on broad federica-operative prophylaxis to Vancomycin/Meropenem/Fluconazole based upon his prior LVAD placement and prolonged hospitalization pre-transplant. Intermediate risk for CMV (seropositive); intermediate risk for toxoplasmosis (seropositive R). Will need to start PPX when able to tolerate PO, stabilization of Cr. Suspect leukocytosis is reactive, not indicative of infection; however patient high risk, prolonged hospitalization, LVAD; reasonable to continue PPX. Leukocytosis, S/p cardiac transplant, CMV/toxo serology positive.    - Continue post operative Vanco/Isabel/Fluconazole (Would continue in setting of profound leukocytosis; monitor vanco levels closely)  - Bactrim (PCP PPX)/Valcyte (CMV PPX) to be added when Cr improved, able to tolerate PO  - Will need to follow up on donor serologies for CMV, HCV viral load and genotype  - Trend WBC, monitor temperatures  - please send Vanco trough  - Cultures are NGTD, WBC remains elevated but in the setting of stress dose steroids, and improving overall condition 66 yo man s/p prior LVAD placement, no infections related to the LVAD prior to undergoing an orthotopic heart transplant 2/23 from a reportedly high risk donor HCV. Patient on broad federica-operative prophylaxis to Vancomycin/Meropenem/Fluconazole based upon his prior LVAD placement and prolonged hospitalization pre-transplant. Intermediate risk for CMV (seropositive); intermediate risk for toxoplasmosis (seropositive R). Will need to start PPX when able to tolerate PO, stabilization of Cr. Suspect leukocytosis is reactive, not indicative of infection; however patient high risk, prolonged hospitalization, LVAD; reasonable to continue PPX. Leukocytosis, S/p cardiac transplant, CMV/toxo serology positive.    - Continue post operative Vanco/Isabel/Fluconazole (Would continue in setting of profound leukocytosis; monitor vanco levels closely)  - Bactrim (PCP PPX)/Valcyte (CMV PPX) to be added when Cr improved, able to tolerate PO  - Will need to follow up on donor serologies for CMV, HCV viral load and genotype  - Trend WBC, monitor temperatures  - please send Vanco trough  - Cultures are NGTD, WBC remains elevated but in the setting of stress dose steroids, and improving overall condition

## 2018-02-26 NOTE — PROGRESS NOTE ADULT - SUBJECTIVE AND OBJECTIVE BOX
Interval History:  IABP weaned off yesterday and d/c'ed   making good UOP to lasix gtt  had [place] placed in cath lab via groin today given b/l IJ clots  received plasmapharesis session today    Medications:  dexmedetomidine Infusion 0.5 MICROgram(s)/kG/Hr IV Continuous <Continuous>  DOBUTamine Infusion 4 MICROgram(s)/kG/Min IV Continuous <Continuous>  docusate sodium 100 milliGRAM(s) Oral three times a day  EPINEPHrine 8 milliGRAM(s),sodium chloride 250 milliLiter(s) 8 milliGRAM(s) IV Continuous <Continuous>  fluconAZOLE IVPB      fluconAZOLE IVPB 200 milliGRAM(s) IV Intermittent every 24 hours  insulin Infusion 30 Unit(s)/Hr IV Continuous <Continuous>  meropenem  IVPB      meropenem  IVPB 1000 milliGRAM(s) IV Intermittent every 12 hours  methylPREDNISolone sodium succinate Injectable 32 milliGRAM(s) IV Push every 12 hours  metroNIDAZOLE  IVPB      metroNIDAZOLE  IVPB 500 milliGRAM(s) IV Intermittent every 8 hours  milrinone Infusion 0.2 MICROgram(s)/kG/Min IV Continuous <Continuous>  mycophenolate mofetil IVPB 1 Gram(s) IV Intermittent <User Schedule>  norepinephrine Infusion 0.014 MICROgram(s)/kG/Min IV Continuous <Continuous>  pantoprazole  Injectable 40 milliGRAM(s) IV Push daily  sodium chloride 0.9%. 1000 milliLiter(s) IV Continuous <Continuous>  tacrolimus 1 milliGRAM(s) Oral every 12 hours  vancomycin  IVPB 1000 milliGRAM(s) IV Intermittent every 12 hours  vasopressin Infusion 0.1 Unit(s)/Min IV Continuous <Continuous>    Vitals:  T(C): 36.6 (18 @ 19:00), Max: 37.3 (18 @ 23:00)  HR: 103 (18 @ 19:00) (93 - 106)  ABP: 152/74 (18 @ 19:00) (82/42 - 183/87)  ABP(mean): 92 (18 @ 19:00) (53 - 109)  RR: 16 (18 @ 19:00) (13 - 26)  SpO2: 100% (18 @ 19:00) (96% - 100%)  CO: 5.9 (18 @ 19:00) (4.8 - 6.2)  CI: 3 (18 @ 19:00) (2.5 - 3.2)  PA: / (18 @ 19:00) ( - )  PA(mean): 20 (18 @ 19:00) (17 - 23)  SVR: 1164 (18 @ 19:00) (890 - 1351)    Daily     Daily Weight in k (2018 00:00)    I&O's Summary    2018 07:  -  2018 07:00  --------------------------------------------------------  IN: 3274 mL / OUT: 5515 mL / NET: -2241 mL    2018 07:  -  2018 20:04  --------------------------------------------------------  IN: 2002.9 mL / OUT: 2885 mL / NET: -882.1 mL    Physical Exam:  Appearance: Intubated, sedated, anasarcic  HEENT: PERRL  Cardiovascular: Normal S1 S2, No murmurs/rubs/gallops  Respiratory: Clear to auscultation bilaterally anterior fields  Gastrointestinal: Soft, Non-tender	  Skin: No cyanosis	  Neurologic: Non-focal  Extremities: 1+ LE edema b/l    Labs:                        10.4   35.6  )-----------( 144      ( 2018 17:16 )             31.2         136  |  94<L>  |  42<H>  ----------------------------<  137<H>  4.1   |  30  |  1.48<H>    Ca    8.1<L>      2018 17:16  Phos  5.1       Mg     2.0         TPro  6.3  /  Alb  3.0<L>  /  TBili  0.5  /  DBili  x   /  AST  55<H>  /  ALT  26  /  AlkPhos  62      PT/INR - ( 2018 17:16 )   PT: 11.6 sec;   INR: 1.06 ratio       PTT - ( 2018 17:16 )  PTT:24.7 sec    Serum Pro-Brain Natriuretic Peptide: 9103 pg/mL ( @ 02:14)    Oxygen Saturation, Mixed: 66 % ( @ 18:40)  Oxygen Saturation, Mixed: 59 % ( @ 17:10)  Oxygen Saturation, Mixed: 50 % ( @ 13:29)  Oxygen Saturation, Mixed: 52 % ( @ 10:52)    Lactate Dehydrogenase, Serum: 500 U/L ( @ 02:14)  Lactate Dehydrogenase, Serum: 527 U/L ( @ 19:02)  Lactate Dehydrogenase, Serum: 550 U/L ( @ 01:24)  Lactate Dehydrogenase, Serum: 453 U/L ( @ 01:30)    TELEMETRY:  sinus tach    Echocardiogram:  18   Mitral Valve: Normal mitral valve.  Aortic Valve/Aorta: Normal trileaflet aortic valve. Peak  left ventricular outflow tract gradient equals 1 mm Hg,  mean gradient is equal to 1 mm Hg, LVOT velocity time  integral equals 7 cm.  LVOT diameter: 2.3 cm.  Left Atrium: Left atrial enlargement.  Left Ventricle: Normal left ventricular systolic function.  Septal motion is consistent with BBB. Normal left  ventricular internal dimensions and wall thicknesses.  Right Heart: Normal right atrium. Decreased right  ventricular systolic function. Normal tricuspid valve.  Minimal tricuspid regurgitation. Normal pulmonic valve.  Pericardium/Pleura: Normal pericardium with no pericardial  effusion.  Hemodynamic: Estimated right atrial pressure is 14 mm Hg.  Estimated right ventricular systolic pressure equals 37 mm  Hg, assuming right atrial pressure equals 14 mm Hg,  consistent with borderline pulmonary hypertension.

## 2018-02-26 NOTE — PROGRESS NOTE ADULT - SUBJECTIVE AND OBJECTIVE BOX
CRITICAL CARE ATTENDING - CTICU    MEDICATIONS  (STANDING):  amiodarone Infusion 0.5 mG/Min (16.667 mL/Hr) IV Continuous <Continuous>  dexmedetomidine Infusion 0.5 MICROgram(s)/kG/Hr (9.713 mL/Hr) IV Continuous <Continuous>  DOBUTamine Infusion 1 MICROgram(s)/kG/Min (2.331 mL/Hr) IV Continuous <Continuous>  docusate sodium 100 milliGRAM(s) Oral three times a day  EPINEPHrine 8 milliGRAM(s),sodium chloride 250 milliLiter(s) 8 milliGRAM(s) (20.4 mL/Hr) IV Continuous <Continuous>  fluconAZOLE IVPB      fluconAZOLE IVPB 200 milliGRAM(s) IV Intermittent every 24 hours  furosemide Infusion 15 mG/Hr (7.5 mL/Hr) IV Continuous <Continuous>  immune globulin gamma IVPB 8 Gram(s) IV Intermittent once  insulin Infusion 30 Unit(s)/Hr (30 mL/Hr) IV Continuous <Continuous>  magnesium sulfate  IVPB 2 Gram(s) IV Intermittent once  meropenem  IVPB      meropenem  IVPB 1000 milliGRAM(s) IV Intermittent every 12 hours  methylPREDNISolone sodium succinate Injectable 32 milliGRAM(s) IV Push every 12 hours  metroNIDAZOLE  IVPB      metroNIDAZOLE  IVPB 500 milliGRAM(s) IV Intermittent every 8 hours  milrinone Infusion 0.3 MICROgram(s)/kG/Min (6.993 mL/Hr) IV Continuous <Continuous>  mycophenolate mofetil IVPB 1 Gram(s) IV Intermittent <User Schedule>  norepinephrine Infusion 0.014 MICROgram(s)/kG/Min (2 mL/Hr) IV Continuous <Continuous>  pantoprazole  Injectable 40 milliGRAM(s) IV Push daily  propofol Infusion 10.725 MICROgram(s)/kG/Min (5 mL/Hr) IV Continuous <Continuous>  sodium chloride 0.9%. 1000 milliLiter(s) (10 mL/Hr) IV Continuous <Continuous>  tacrolimus 0.5 milliGRAM(s) Oral every 12 hours  vancomycin  IVPB 1000 milliGRAM(s) IV Intermittent every 12 hours  vasopressin Infusion 0.1 Unit(s)/Min (6 mL/Hr) IV Continuous <Continuous>                                    8.7    43.3  )-----------( 143      ( 2018 02:14 )             25.9       02-26    134<L>  |  93<L>  |  42<H>  ----------------------------<  121<H>  4.3   |  27  |  1.64<H>    Ca    8.1<L>      2018 02:14  Phos  5.1       Mg     2.0         TPro  6.3  /  Alb  3.0<L>  /  TBili  0.5  /  DBili  x   /  AST  55<H>  /  ALT  26  /  AlkPhos  62        PT/INR - ( 2018 01:24 )   PT: 12.9 sec;   INR: 1.18 ratio         PTT - ( 2018 01:24 )  PTT:26.6 sec    Mode: AC/ CMV (Assist Control/ Continuous Mandatory Ventilation)  RR (machine): 16  TV (machine): 550  FiO2: 50  PEEP: 5  ITime: 1  MAP: 8  PIP: 20      Daily     Daily Weight in k (2018 00:00)       @ 07: @ 07:00  --------------------------------------------------------  IN: 3274 mL / OUT: 5515 mL / NET: -2241 mL     @ 07: @ 10:32  --------------------------------------------------------  IN: 531 mL / OUT: 815 mL / NET: -284 mL        Critically Ill patient  : [ ] preoperative ,   [x ] post operative    Requires :  [ x] Arterial Line   [x] Central Line  [ ] PA catheter  [ ] IABP  [ ] ECMO  [ ] LVAD  [x ] Ventilator  [x ] pacemaker [ ] Impella.    Bedside evaluation , monitoring , treatment of hemodynamics , fluids , IVP/ IVCD meds.        Diagnosis:     POD 3 Heart Transplant    Cardiogenic Shock     CHF- acute [x ]   chronic [ ]    systolic [x ]   diatolic [x ]          - Echo- EF -             [x ] RV dysfunction          - Cxr-cardiomegally, edema          - Clinical-  [x ]inotropes   [x ]pressors   [x ]diuresis   [ ]IABP   [ ]ECMO   [ ]LVAD   x[ ]Respiratory Failure    Acute graft Dysfunction    Hemodynamic lability,instability. Requires IVCD [x ] vasopressors [x ] inotropes  [ ] vasodilator  [x ]IVSS fluid  to maintain MAP, perfusion, C.I.     Plasmaphoresis today    TTE    [place] catheter insertion in cath lab    fluid overload     Renal Failure     Ventilator Management:  [x ]AC-rest    [x ]CPAP-PS Wean    [ ]Trach Collar     [ ]Extubate    [ ] T-Piece  [ ]peep>5     Nitric Oxide                        -                     Discussed with CT surgeon, Physician's Assistant - Nurse Practitioner- Critical care medicine team.   Dicussed at  AM / PM rounds.   Chart, labs , films reviewed.    Total Time: 180 min

## 2018-02-26 NOTE — PROGRESS NOTE ADULT - ASSESSMENT
Mr. Baez is a 67 year old man with ACC/AHA stage D chronic systolic heart failure due to NICM (LVEF 20%) s/p HM2 LVAD 6/17 c/b pump thrombus now s/p OHT 2/23 (CMV D-/R+ and Toxo D-/R+), with post-op course c/b primary graft dysfuction with biventricular dysfunction, initially thought to be immune-mediated due to positive B-cell flow (negative CDC cross match) and received plasmapharesis/IVIg with improvement in LV function since to 55% with persistent RV dysfunction but improving hemodynamically on current support. Was found to have b/l IJ/subclavian thrombi leading to vascular access so [place] placed via groin.     Immunosuppression prophylaxis:  Tacrolimus - started on prograf 2/25; increase to 1 mg sublingual BID this evening at 20:00.  Continue CellCept 1000 mg IV BID. Dosing can continue at 10:00 and 22:00 given original plan.  Solumedrol - continue taper; to receive 32 mg IV q12 taper dose to 36 mg IV at 20:00 dose x1 then start with taper schedule at 32 mg IV q12h tomorrow morning.  	  Antimicrobial prophylaxis:  Intermediate risk CMV - Eventually will start IV ganciclovir or oral valganciclovir when more stable. No urgency.  Intermediate risk Toxo - no prophylaxis needed  PCP - delay start of Bactrim until renal function is stable and patient is extubated.  Broadened [employer]-operative coverage per Dr. Lujan: Fluconazole 200 mg IV daily, Meropenem 1 gm IV q12h, Vancomycin 1 gm IV q12h since he was an LVAD explant. Given massive elevation in WBC count, would prolong coverage, but be cautious with Vanco dosing given renal function.    Other prophylaxis:  Protonix 40 mg IV daily for GI prophylaxis while on steroids.    Critical Care Time = 120 minutes. Mr. Baez is a 67 year old man with ACC/AHA stage D chronic systolic heart failure due to NICM (LVEF 20%) s/p HM2 LVAD  c/b pump thrombus now s/p OHT  (CMV D-/R+ and Toxo D-/R+), with post-op course c/b primary graft dysfuction with biventricular dysfunction, initially thought to be immune-mediated due to positive B-cell flow (negative CDC cross match) and received plasmapharesis/IVIg with improvement in LV function since to 55% with persistent RV dysfunction but improving hemodynamically on current support. Was found to have b/l IJ/subclavian thrombi leading to vascular access so [place] placed via groin.     Immunosuppression prophylaxis:  Tacrolimus - started on prograf ; increase to 1 mg sublingual BID this evening at 20:00; please check levels at 7:30 am (stat)  Continue CellCept 1000 mg IV BID. Dosing can continue at 10:00 and 22:00 given original plan.  Solumedrol - continue taper; to receive 32 mg IV q12 tonight, then 28 mg IV q12h tomorrow morning  	  Antimicrobial prophylaxis:  Intermediate risk CMV - Eventually will start IV ganciclovir or oral valganciclovir when more stable. No urgency.  Intermediate risk Toxo - no prophylaxis needed  PCP - delay start of Bactrim until renal function is stable and patient is extubated.  Broadened federica-operative coverage per Dr. Lujan: Fluconazole 200 mg IV daily, Meropenem 1 gm IV q12h, Vancomycin 1 gm IV q12h since he was an LVAD explant. Given massive elevation in WBC count (downtrending), would prolong coverage, but be cautious with Vanco dosing given renal function.    Graft function:  LV function improved; persistent RV dysfunction however CI stable  Wean off epi and transition to  for RV support  maintain CVP 6-10   would be slow to wean off Kamala until inotrope requirement lower  plan for biopsy Thursday    Other prophylaxis:  Protonix 40 mg IV daily for GI prophylaxis while on steroids.    Critical Care Time = 120 minutes.

## 2018-02-26 NOTE — PROGRESS NOTE ADULT - SUBJECTIVE AND OBJECTIVE BOX
INFECTIOUS DISEASES FOLLOW UP-- Sujey Lujan  [phone]    This is a follow up note for this  67yMale with s/p heart transplant day #      ROS:  CONSTITUTIONAL:  opens eyes to name    Allergies    No Known Allergies    Intolerances        ANTIBIOTICS/RELEVANT:  antimicrobials  fluconAZOLE IVPB      fluconAZOLE IVPB 200 milliGRAM(s) IV Intermittent every 24 hours  meropenem  IVPB      meropenem  IVPB 1000 milliGRAM(s) IV Intermittent every 12 hours  metroNIDAZOLE  IVPB      metroNIDAZOLE  IVPB 500 milliGRAM(s) IV Intermittent every 8 hours  vancomycin  IVPB 1000 milliGRAM(s) IV Intermittent every 12 hours    immunologic:  immune globulin gamma IVPB 8 Gram(s) IV Intermittent once  mycophenolate mofetil IVPB 1 Gram(s) IV Intermittent <User Schedule>  tacrolimus 0.5 milliGRAM(s) Oral every 12 hours    OTHER:  amiodarone Infusion 0.5 mG/Min IV Continuous <Continuous>  dexmedetomidine Infusion 0.5 MICROgram(s)/kG/Hr IV Continuous <Continuous>  DOBUTamine Infusion 3 MICROgram(s)/kG/Min IV Continuous <Continuous>  docusate sodium 100 milliGRAM(s) Oral three times a day  EPINEPHrine 8 milliGRAM(s),sodium chloride 250 milliLiter(s) 8 milliGRAM(s) IV Continuous <Continuous>  furosemide Infusion 5 mG/Hr IV Continuous <Continuous>  insulin Infusion 30 Unit(s)/Hr IV Continuous <Continuous>  methylPREDNISolone sodium succinate Injectable 32 milliGRAM(s) IV Push every 12 hours  milrinone Infusion 0.3 MICROgram(s)/kG/Min IV Continuous <Continuous>  norepinephrine Infusion 0.014 MICROgram(s)/kG/Min IV Continuous <Continuous>  pantoprazole  Injectable 40 milliGRAM(s) IV Push daily  sodium chloride 0.9%. 1000 milliLiter(s) IV Continuous <Continuous>  vasopressin Infusion 0.1 Unit(s)/Min IV Continuous <Continuous>      Objective:  Vital Signs Last 24 Hrs  T(C): 36.6 (26 Feb 2018 15:00), Max: 37.3 (25 Feb 2018 23:00)  T(F): 97.9 (26 Feb 2018 15:00), Max: 99.1 (25 Feb 2018 23:00)  HR: 101 (26 Feb 2018 16:45) (93 - 120)  BP: --  BP(mean): --  RR: 16 (26 Feb 2018 16:45) (13 - 26)  SpO2: 100% (26 Feb 2018 16:45) (96% - 100%)    PHYSICAL EXAM:  Constitutional:no acute distress, opens eyes to name  plan to remove left neck [place] introducer  Eyes:WALT, EOMI  Ear/Nose/Throat: no oral lesions, intubated	  Respiratory: clear BL anteriorly  sternotmy wound dressing dry and intact  chest tube sites are clean without discharge  Cardiovascular: tachy  Gastrointestinal:soft, (+) BS, no tenderness  left groin CVC removed  right with new [place] introducer  Extremities:no e/e/c  No Lymphadenopathy  IV sites not inflammed.    LABS:                        8.7    43.3  )-----------( 143      ( 26 Feb 2018 02:14 )             25.9     02-26    134<L>  |  93<L>  |  42<H>  ----------------------------<  121<H>  4.3   |  27  |  1.64<H>    Ca    8.1<L>      26 Feb 2018 02:14  Phos  5.1     02-26  Mg     2.0     02-26    TPro  6.3  /  Alb  3.0<L>  /  TBili  0.5  /  DBili  x   /  AST  55<H>  /  ALT  26  /  AlkPhos  62  02-26    PT/INR - ( 25 Feb 2018 01:24 )   PT: 12.9 sec;   INR: 1.18 ratio         PTT - ( 25 Feb 2018 01:24 )  PTT:26.6 sec      MICROBIOLOGY:            RECENT CULTURES:  02-23 @ 16:56  Pericardial Pericardial Fluid  --  --  --    No growth to date.  --      RADIOLOGY & ADDITIONAL STUDIES:    < from: Xray Chest 1 View- PORTABLE-Routine (02.26.18 @ 03:53) >  IMPRESSION:     Lines and tubes as above.    No pneumothorax. Minimal left basilar linear subsegmental atelectasis.    < end of copied text >

## 2018-02-26 NOTE — CHART NOTE - NSCHARTNOTEFT_GEN_A_CORE
Source: Patient [ ]    Family [ ]     other [ x ] RN, NP, Comprehensive chart review     Pt seen for nutrition follow up. Chart reviewed- pt with LVAD, admitted with increasing LDH with concern for pump thrombosis; now s/p cardiac transplant, LVAD explant, and AICD removal (POD #3); noted with non-immune mediated graft rejection and being treated with plasmapheresis. Per d/w team, no plan for enteral nutrition support at this time due to current acute illness.     Diet : no active diet order at this time      Admission Weight: 78.9kg  Current Weight: 77kg  Weight fluctuations noted, likely due to fluid shifts. Pt with 1+generalized and 2+bilateral hand edema.     Pertinent Medications: MEDICATIONS  (STANDING):  amiodarone Infusion 0.5 mG/Min (16.667 mL/Hr) IV Continuous <Continuous>  dexmedetomidine Infusion 0.5 MICROgram(s)/kG/Hr (9.713 mL/Hr) IV Continuous <Continuous>  DOBUTamine Infusion 2 MICROgram(s)/kG/Min (4.662 mL/Hr) IV Continuous <Continuous>  docusate sodium 100 milliGRAM(s) Oral three times a day  EPINEPHrine 8 milliGRAM(s),sodium chloride 250 milliLiter(s) 8 milliGRAM(s) (20.4 mL/Hr) IV Continuous <Continuous>  fluconAZOLE IVPB      fluconAZOLE IVPB 200 milliGRAM(s) IV Intermittent every 24 hours  furosemide Infusion 10 mG/Hr (5 mL/Hr) IV Continuous <Continuous>  immune globulin gamma IVPB 8 Gram(s) IV Intermittent once  insulin Infusion 30 Unit(s)/Hr (30 mL/Hr) IV Continuous <Continuous>  meropenem  IVPB      meropenem  IVPB 1000 milliGRAM(s) IV Intermittent every 12 hours  methylPREDNISolone sodium succinate Injectable 32 milliGRAM(s) IV Push every 12 hours  metroNIDAZOLE  IVPB      metroNIDAZOLE  IVPB 500 milliGRAM(s) IV Intermittent every 8 hours  milrinone Infusion 0.3 MICROgram(s)/kG/Min (6.993 mL/Hr) IV Continuous <Continuous>  mycophenolate mofetil IVPB 1 Gram(s) IV Intermittent <User Schedule>  norepinephrine Infusion 0.014 MICROgram(s)/kG/Min (2 mL/Hr) IV Continuous <Continuous>  pantoprazole  Injectable 40 milliGRAM(s) IV Push daily  propofol Infusion 10.725 MICROgram(s)/kG/Min (5 mL/Hr) IV Continuous <Continuous>  sodium chloride 0.9%. 1000 milliLiter(s) (10 mL/Hr) IV Continuous <Continuous>  tacrolimus 0.5 milliGRAM(s) Oral every 12 hours  vancomycin  IVPB 1000 milliGRAM(s) IV Intermittent every 12 hours  vasopressin Infusion 0.1 Unit(s)/Min (6 mL/Hr) IV Continuous <Continuous>    MEDICATIONS  (PRN):    Pertinent Labs:    Complete Blood Count (02.26.18 @ 02:14)    Hemoglobin: 8.7 g/dL - low    Hematocrit: 25.9 % - low  Comprehensive Metabolic Panel (02.26.18 @ 02:14)    Sodium, Serum: 134 mmol/L - low    Potassium, Serum: 4.3 mmol/L    Chloride, Serum: 93 mmol/L - low    Carbon Dioxide, Serum: 27 mmol/L    Anion Gap, Serum: 14 mmol/L    Blood Urea Nitrogen, Serum: 42 mg/dL - high    Creatinine, Serum: 1.64 mg/dL - high    Glucose, Serum: 121 mg/dL - high    Calcium, Total Serum: 8.1 mg/dL - low    Protein Total, Serum: 6.3 g/dL    Albumin, Serum: 3.0 g/dL - low    Bilirubin Total, Serum: 0.5 mg/dL    Alkaline Phosphatase, Serum: 62 U/L    Aspartate Aminotransferase (AST/SGOT): 55 U/L - high    Alanine Aminotransferase (ALT/SGPT): 26 U/L RC  Phosphorus Level, Serum (02.26.18 @ 02:14)    Phosphorus Level, Serum: 5.1 mg/dL - high  Magnesium, Serum (02.26.18 @ 02:14)    Magnesium, Serum: 2.0 mg/dL  Lactate Dehydrogenase, Serum (02.26.18 @ 02:14)    Lactate Dehydrogenase, Serum: 500 U/L - high    Point of Care Testing BG: (2/26), (2/25).    Skin: No pressure ulcers noted.     Estimated Needs:   [ ] no change since previous assessment  [ x ] recalculated: Based on IBW 69.8kg  2094-2443kcal/day (30-35kcal/kg)  97.7-111.7gm prot/day (1.4-1.6gm prot/kg)      Previous Nutrition Diagnosis:   Limited adherence to nutrition related recommendations not applicable at this time.   Inadequate oral intake resumed as pt currently NPO.      New Nutrition Diagnosis:   Increased Nutrient Needs related to increased demand for nutrients to promote postoperative healing as evidenced by pt s/p cardiac transplant, removal of AICD, and LVAD explant via sternotomy.       Interventions:   1. If PO intake remains contraindicated, recommend initiate enteral nutrition support when medically feasible with [email or web address] and advance to goal rate 75ml/hr as tolerated to provide 2160kcal and 100gm prot (31kcal/kg and 1.4gm prot/kg per IBW 69.8kg).   2. If PO intake become appropriate, recommend advance diet to regular, low sodium with Ensure Enlive 2 cans/day as tolerated.   3. Recommendations d/w team.        Monitoring and Evaluation:     [ x ] PO intake [ x ] Tolerance to diet prescription [ x ] weights [ x ] follow up per protocol    [ ] other:

## 2018-02-27 LAB
ALBUMIN SERPL ELPH-MCNC: 3.2 G/DL — LOW (ref 3.3–5)
ALP SERPL-CCNC: 59 U/L — SIGNIFICANT CHANGE UP (ref 40–120)
ALT FLD-CCNC: 16 U/L RC — SIGNIFICANT CHANGE UP (ref 10–45)
ANION GAP SERPL CALC-SCNC: 11 MMOL/L — SIGNIFICANT CHANGE UP (ref 5–17)
ANION GAP SERPL CALC-SCNC: 14 MMOL/L — SIGNIFICANT CHANGE UP (ref 5–17)
APCR PPP: 2.7 RATIO — SIGNIFICANT CHANGE UP
APTT BLD: 24 SEC — LOW (ref 27.5–37.4)
APTT BLD: 25.5 SEC — LOW (ref 27.5–37.4)
AST SERPL-CCNC: 29 U/L — SIGNIFICANT CHANGE UP (ref 10–40)
AT III AG PPP IA-MCNC: 20 MG/DL — LOW (ref 22–39)
B2 GLYCOPROT1 AB SER QL: NEGATIVE — SIGNIFICANT CHANGE UP
BASE EXCESS BLDMV CALC-SCNC: 0.9 MMOL/L — SIGNIFICANT CHANGE UP (ref -3–3)
BASE EXCESS BLDMV CALC-SCNC: 2.2 MMOL/L — SIGNIFICANT CHANGE UP (ref -3–3)
BASE EXCESS BLDMV CALC-SCNC: 3.1 MMOL/L — HIGH (ref -3–3)
BASE EXCESS BLDMV CALC-SCNC: 3.4 MMOL/L — HIGH (ref -3–3)
BASE EXCESS BLDMV CALC-SCNC: 3.4 MMOL/L — HIGH (ref -3–3)
BASE EXCESS BLDMV CALC-SCNC: 3.5 MMOL/L — HIGH (ref -3–3)
BASE EXCESS BLDMV CALC-SCNC: 3.8 MMOL/L — HIGH (ref -3–3)
BASE EXCESS BLDMV CALC-SCNC: 4.7 MMOL/L — HIGH (ref -3–3)
BASE EXCESS BLDMV CALC-SCNC: 5 MMOL/L — HIGH (ref -3–3)
BASE EXCESS BLDMV CALC-SCNC: 5 MMOL/L — HIGH (ref -3–3)
BASE EXCESS BLDMV CALC-SCNC: 5.6 MMOL/L — HIGH (ref -3–3)
BASE EXCESS BLDMV CALC-SCNC: 5.7 MMOL/L — HIGH (ref -3–3)
BASE EXCESS BLDMV CALC-SCNC: 6.2 MMOL/L — HIGH (ref -3–3)
BILIRUB SERPL-MCNC: 0.7 MG/DL — SIGNIFICANT CHANGE UP (ref 0.2–1.2)
BUN SERPL-MCNC: 43 MG/DL — HIGH (ref 7–23)
BUN SERPL-MCNC: 50 MG/DL — HIGH (ref 7–23)
CALCIUM SERPL-MCNC: 7.8 MG/DL — LOW (ref 8.4–10.5)
CALCIUM SERPL-MCNC: 8.1 MG/DL — LOW (ref 8.4–10.5)
CARDIOLIPIN AB SER-ACNC: NEGATIVE — SIGNIFICANT CHANGE UP
CHLORIDE SERPL-SCNC: 97 MMOL/L — SIGNIFICANT CHANGE UP (ref 96–108)
CHLORIDE SERPL-SCNC: 99 MMOL/L — SIGNIFICANT CHANGE UP (ref 96–108)
CO2 BLDMV-SCNC: 26 MMOL/L — SIGNIFICANT CHANGE UP (ref 21–29)
CO2 BLDMV-SCNC: 28 MMOL/L — SIGNIFICANT CHANGE UP (ref 21–29)
CO2 BLDMV-SCNC: 29 MMOL/L — SIGNIFICANT CHANGE UP (ref 21–29)
CO2 BLDMV-SCNC: 30 MMOL/L — HIGH (ref 21–29)
CO2 BLDMV-SCNC: 31 MMOL/L — HIGH (ref 21–29)
CO2 BLDMV-SCNC: 32 MMOL/L — HIGH (ref 21–29)
CO2 SERPL-SCNC: 26 MMOL/L — SIGNIFICANT CHANGE UP (ref 22–31)
CO2 SERPL-SCNC: 29 MMOL/L — SIGNIFICANT CHANGE UP (ref 22–31)
CREAT SERPL-MCNC: 1.39 MG/DL — HIGH (ref 0.5–1.3)
CREAT SERPL-MCNC: 1.45 MG/DL — HIGH (ref 0.5–1.3)
GAS PNL BLDA: SIGNIFICANT CHANGE UP
GAS PNL BLDMV: SIGNIFICANT CHANGE UP
GLUCOSE BLDC GLUCOMTR-MCNC: 102 MG/DL — HIGH (ref 70–99)
GLUCOSE BLDC GLUCOMTR-MCNC: 104 MG/DL — HIGH (ref 70–99)
GLUCOSE BLDC GLUCOMTR-MCNC: 111 MG/DL — HIGH (ref 70–99)
GLUCOSE BLDC GLUCOMTR-MCNC: 114 MG/DL — HIGH (ref 70–99)
GLUCOSE BLDC GLUCOMTR-MCNC: 116 MG/DL — HIGH (ref 70–99)
GLUCOSE BLDC GLUCOMTR-MCNC: 117 MG/DL — HIGH (ref 70–99)
GLUCOSE BLDC GLUCOMTR-MCNC: 117 MG/DL — HIGH (ref 70–99)
GLUCOSE BLDC GLUCOMTR-MCNC: 118 MG/DL — HIGH (ref 70–99)
GLUCOSE BLDC GLUCOMTR-MCNC: 127 MG/DL — HIGH (ref 70–99)
GLUCOSE BLDC GLUCOMTR-MCNC: 130 MG/DL — HIGH (ref 70–99)
GLUCOSE BLDC GLUCOMTR-MCNC: 131 MG/DL — HIGH (ref 70–99)
GLUCOSE BLDC GLUCOMTR-MCNC: 138 MG/DL — HIGH (ref 70–99)
GLUCOSE BLDC GLUCOMTR-MCNC: 96 MG/DL — SIGNIFICANT CHANGE UP (ref 70–99)
GLUCOSE BLDC GLUCOMTR-MCNC: 99 MG/DL — SIGNIFICANT CHANGE UP (ref 70–99)
GLUCOSE SERPL-MCNC: 104 MG/DL — HIGH (ref 70–99)
GLUCOSE SERPL-MCNC: 126 MG/DL — HIGH (ref 70–99)
HCO3 BLDMV-SCNC: 25 MMOL/L — SIGNIFICANT CHANGE UP (ref 20–28)
HCO3 BLDMV-SCNC: 26 MMOL/L — SIGNIFICANT CHANGE UP (ref 20–28)
HCO3 BLDMV-SCNC: 27 MMOL/L — SIGNIFICANT CHANGE UP (ref 20–28)
HCO3 BLDMV-SCNC: 28 MMOL/L — SIGNIFICANT CHANGE UP (ref 20–28)
HCO3 BLDMV-SCNC: 29 MMOL/L — HIGH (ref 20–28)
HCO3 BLDMV-SCNC: 30 MMOL/L — HIGH (ref 20–28)
HCT VFR BLD CALC: 25.1 % — LOW (ref 39–50)
HCT VFR BLD CALC: 29.2 % — LOW (ref 39–50)
HCV GENTYP BLD NAA+PROBE: ABNORMAL
HEPARIN INHIBITION TEG PNL BLD: 100 % — HIGH (ref 0–0)
HGB BLD-MCNC: 8.6 G/DL — LOW (ref 13–17)
HGB BLD-MCNC: 9.9 G/DL — LOW (ref 13–17)
HOROWITZ INDEX BLDMV+IHG-RTO: 40 — SIGNIFICANT CHANGE UP
HOROWITZ INDEX BLDMV+IHG-RTO: 50 — SIGNIFICANT CHANGE UP
INR BLD: 1.06 RATIO — SIGNIFICANT CHANGE UP (ref 0.88–1.16)
INR BLD: 1.12 RATIO — SIGNIFICANT CHANGE UP (ref 0.88–1.16)
MCHC RBC-ENTMCNC: 31.9 PG — SIGNIFICANT CHANGE UP (ref 27–34)
MCHC RBC-ENTMCNC: 32.1 PG — SIGNIFICANT CHANGE UP (ref 27–34)
MCHC RBC-ENTMCNC: 33.8 GM/DL — SIGNIFICANT CHANGE UP (ref 32–36)
MCHC RBC-ENTMCNC: 34.1 GM/DL — SIGNIFICANT CHANGE UP (ref 32–36)
MCV RBC AUTO: 94.2 FL — SIGNIFICANT CHANGE UP (ref 80–100)
MCV RBC AUTO: 94.3 FL — SIGNIFICANT CHANGE UP (ref 80–100)
NT-PROBNP SERPL-SCNC: 3895 PG/ML — HIGH (ref 0–300)
O2 CT VFR BLD CALC: 30 MMHG — SIGNIFICANT CHANGE UP (ref 30–65)
O2 CT VFR BLD CALC: 31 MMHG — SIGNIFICANT CHANGE UP (ref 30–65)
O2 CT VFR BLD CALC: 32 MMHG — SIGNIFICANT CHANGE UP (ref 30–65)
O2 CT VFR BLD CALC: 32 MMHG — SIGNIFICANT CHANGE UP (ref 30–65)
O2 CT VFR BLD CALC: 33 MMHG — SIGNIFICANT CHANGE UP (ref 30–65)
O2 CT VFR BLD CALC: 33 MMHG — SIGNIFICANT CHANGE UP (ref 30–65)
O2 CT VFR BLD CALC: 35 MMHG — SIGNIFICANT CHANGE UP (ref 30–65)
O2 CT VFR BLD CALC: 35 MMHG — SIGNIFICANT CHANGE UP (ref 30–65)
O2 CT VFR BLD CALC: 36 MMHG — SIGNIFICANT CHANGE UP (ref 30–65)
O2 CT VFR BLD CALC: 36 MMHG — SIGNIFICANT CHANGE UP (ref 30–65)
O2 CT VFR BLD CALC: 37 MMHG — SIGNIFICANT CHANGE UP (ref 30–65)
O2 CT VFR BLD CALC: 37 MMHG — SIGNIFICANT CHANGE UP (ref 30–65)
O2 CT VFR BLD CALC: 38 MMHG — SIGNIFICANT CHANGE UP (ref 30–65)
PCO2 BLDMV: 39 MMHG — SIGNIFICANT CHANGE UP (ref 30–65)
PCO2 BLDMV: 41 MMHG — SIGNIFICANT CHANGE UP (ref 30–65)
PCO2 BLDMV: 42 MMHG — SIGNIFICANT CHANGE UP (ref 30–65)
PCO2 BLDMV: 42 MMHG — SIGNIFICANT CHANGE UP (ref 30–65)
PCO2 BLDMV: 43 MMHG — SIGNIFICANT CHANGE UP (ref 30–65)
PCO2 BLDMV: 43 MMHG — SIGNIFICANT CHANGE UP (ref 30–65)
PCO2 BLDMV: 44 MMHG — SIGNIFICANT CHANGE UP (ref 30–65)
PCO2 BLDMV: 45 MMHG — SIGNIFICANT CHANGE UP (ref 30–65)
PCO2 BLDMV: 45 MMHG — SIGNIFICANT CHANGE UP (ref 30–65)
PF4 HEPARIN CMPLX AB SER-ACNC: POSITIVE
PF4 HEPARIN CMPLX AB SER-ACNC: SIGNIFICANT CHANGE UP
PF4 HEPARIN CMPLX AB SERPL QL IA: 0.55 ABS — HIGH
PH BLDMV: 7.42 — SIGNIFICANT CHANGE UP (ref 7.32–7.45)
PH BLDMV: 7.43 — SIGNIFICANT CHANGE UP (ref 7.32–7.45)
PH BLDMV: 7.44 — SIGNIFICANT CHANGE UP (ref 7.32–7.45)
PH BLDMV: 7.44 — SIGNIFICANT CHANGE UP (ref 7.32–7.45)
PH BLDMV: 7.46 — HIGH (ref 7.32–7.45)
PH BLDMV: 7.46 — HIGH (ref 7.32–7.45)
PHOSPHATE SERPL-MCNC: 3.8 MG/DL — SIGNIFICANT CHANGE UP (ref 2.5–4.5)
PLATELET # BLD AUTO: 138 K/UL — LOW (ref 150–400)
PLATELET # BLD AUTO: 142 K/UL — LOW (ref 150–400)
POTASSIUM SERPL-MCNC: 3.7 MMOL/L — SIGNIFICANT CHANGE UP (ref 3.5–5.3)
POTASSIUM SERPL-MCNC: 4.2 MMOL/L — SIGNIFICANT CHANGE UP (ref 3.5–5.3)
POTASSIUM SERPL-SCNC: 3.7 MMOL/L — SIGNIFICANT CHANGE UP (ref 3.5–5.3)
POTASSIUM SERPL-SCNC: 4.2 MMOL/L — SIGNIFICANT CHANGE UP (ref 3.5–5.3)
PROT C AG PPP-MCNC: 55 % — LOW (ref 80–184)
PROT SERPL-MCNC: 5.8 G/DL — LOW (ref 6–8.3)
PROTHROM AB SERPL-ACNC: 11.6 SEC — SIGNIFICANT CHANGE UP (ref 9.8–12.7)
PROTHROM AB SERPL-ACNC: 12.1 SEC — SIGNIFICANT CHANGE UP (ref 9.8–12.7)
RBC # BLD: 2.67 M/UL — LOW (ref 4.2–5.8)
RBC # BLD: 3.1 M/UL — LOW (ref 4.2–5.8)
RBC # FLD: 15.2 % — HIGH (ref 10.3–14.5)
RBC # FLD: 15.5 % — HIGH (ref 10.3–14.5)
SAO2 % BLDMV: 48 % — LOW (ref 60–90)
SAO2 % BLDMV: 51 % — LOW (ref 60–90)
SAO2 % BLDMV: 52 % — LOW (ref 60–90)
SAO2 % BLDMV: 54 % — LOW (ref 60–90)
SAO2 % BLDMV: 54 % — LOW (ref 60–90)
SAO2 % BLDMV: 55 % — LOW (ref 60–90)
SAO2 % BLDMV: 59 % — LOW (ref 60–90)
SAO2 % BLDMV: 60 % — SIGNIFICANT CHANGE UP (ref 60–90)
SAO2 % BLDMV: 63 % — SIGNIFICANT CHANGE UP (ref 60–90)
SAO2 % BLDMV: 64 % — SIGNIFICANT CHANGE UP (ref 60–90)
SAO2 % BLDMV: 66 % — SIGNIFICANT CHANGE UP (ref 60–90)
SODIUM SERPL-SCNC: 137 MMOL/L — SIGNIFICANT CHANGE UP (ref 135–145)
SODIUM SERPL-SCNC: 139 MMOL/L — SIGNIFICANT CHANGE UP (ref 135–145)
TACROLIMUS SERPL-MCNC: <2 NG/ML — SIGNIFICANT CHANGE UP
TSH SERPL-MCNC: 1.48 UIU/ML — SIGNIFICANT CHANGE UP (ref 0.27–4.2)
VANCOMYCIN TROUGH SERPL-MCNC: 17.2 UG/ML — SIGNIFICANT CHANGE UP (ref 10–20)
VANCOMYCIN TROUGH SERPL-MCNC: 24.3 UG/ML — HIGH (ref 10–20)
WBC # BLD: 26.5 K/UL — HIGH (ref 3.8–10.5)
WBC # BLD: 31.1 K/UL — HIGH (ref 3.8–10.5)
WBC # FLD AUTO: 26.5 K/UL — HIGH (ref 3.8–10.5)
WBC # FLD AUTO: 31.1 K/UL — HIGH (ref 3.8–10.5)

## 2018-02-27 PROCEDURE — 99292 CRITICAL CARE ADDL 30 MIN: CPT

## 2018-02-27 PROCEDURE — 99232 SBSQ HOSP IP/OBS MODERATE 35: CPT

## 2018-02-27 PROCEDURE — 93321 DOPPLER ECHO F-UP/LMTD STD: CPT | Mod: 26

## 2018-02-27 PROCEDURE — 71045 X-RAY EXAM CHEST 1 VIEW: CPT | Mod: 26

## 2018-02-27 PROCEDURE — 90832 PSYTX W PT 30 MINUTES: CPT

## 2018-02-27 PROCEDURE — 99223 1ST HOSP IP/OBS HIGH 75: CPT

## 2018-02-27 PROCEDURE — 93308 TTE F-UP OR LMTD: CPT | Mod: 26

## 2018-02-27 PROCEDURE — 99233 SBSQ HOSP IP/OBS HIGH 50: CPT

## 2018-02-27 PROCEDURE — 99291 CRITICAL CARE FIRST HOUR: CPT

## 2018-02-27 RX ORDER — POTASSIUM CHLORIDE 20 MEQ
20 PACKET (EA) ORAL ONCE
Qty: 0 | Refills: 0 | Status: COMPLETED | OUTPATIENT
Start: 2018-02-27 | End: 2018-02-27

## 2018-02-27 RX ORDER — MYCOPHENOLATE MOFETIL 250 MG/1
1000 CAPSULE ORAL
Qty: 0 | Refills: 0 | Status: DISCONTINUED | OUTPATIENT
Start: 2018-02-27 | End: 2018-04-10

## 2018-02-27 RX ORDER — ARGATROBAN 50 MG/50ML
2 INJECTION, SOLUTION INTRAVENOUS
Qty: 250 | Refills: 0 | Status: DISCONTINUED | OUTPATIENT
Start: 2018-02-27 | End: 2018-03-02

## 2018-02-27 RX ORDER — TACROLIMUS 5 MG/1
3 CAPSULE ORAL
Qty: 0 | Refills: 0 | Status: DISCONTINUED | OUTPATIENT
Start: 2018-02-27 | End: 2018-02-27

## 2018-02-27 RX ORDER — VANCOMYCIN HCL 1 G
1000 VIAL (EA) INTRAVENOUS EVERY 24 HOURS
Qty: 0 | Refills: 0 | Status: DISCONTINUED | OUTPATIENT
Start: 2018-02-27 | End: 2018-02-27

## 2018-02-27 RX ORDER — MYCOPHENOLATE MOFETIL 250 MG/1
1 CAPSULE ORAL ONCE
Qty: 0 | Refills: 0 | Status: COMPLETED | OUTPATIENT
Start: 2018-02-27 | End: 2018-02-27

## 2018-02-27 RX ORDER — ALBUMIN HUMAN 25 %
50 VIAL (ML) INTRAVENOUS
Qty: 0 | Refills: 0 | Status: COMPLETED | OUTPATIENT
Start: 2018-02-27 | End: 2018-02-27

## 2018-02-27 RX ORDER — MYCOPHENOLATE MOFETIL 250 MG/1
1 CAPSULE ORAL
Qty: 0 | Refills: 0 | Status: DISCONTINUED | OUTPATIENT
Start: 2018-02-27 | End: 2018-02-27

## 2018-02-27 RX ORDER — TACROLIMUS 5 MG/1
4 CAPSULE ORAL
Qty: 0 | Refills: 0 | Status: DISCONTINUED | OUTPATIENT
Start: 2018-02-27 | End: 2018-02-28

## 2018-02-27 RX ORDER — ASPIRIN/CALCIUM CARB/MAGNESIUM 324 MG
81 TABLET ORAL DAILY
Qty: 0 | Refills: 0 | Status: DISCONTINUED | OUTPATIENT
Start: 2018-02-27 | End: 2018-02-27

## 2018-02-27 RX ORDER — CALCIUM GLUCONATE 100 MG/ML
1 VIAL (ML) INTRAVENOUS ONCE
Qty: 0 | Refills: 0 | Status: COMPLETED | OUTPATIENT
Start: 2018-02-27 | End: 2018-02-27

## 2018-02-27 RX ORDER — HEPARIN SODIUM 5000 [USP'U]/ML
5000 INJECTION INTRAVENOUS; SUBCUTANEOUS EVERY 8 HOURS
Qty: 0 | Refills: 0 | Status: DISCONTINUED | OUTPATIENT
Start: 2018-02-27 | End: 2018-02-27

## 2018-02-27 RX ORDER — FUROSEMIDE 40 MG
10 TABLET ORAL ONCE
Qty: 0 | Refills: 0 | Status: COMPLETED | OUTPATIENT
Start: 2018-02-27 | End: 2018-02-27

## 2018-02-27 RX ORDER — TACROLIMUS 5 MG/1
1 CAPSULE ORAL ONCE
Qty: 0 | Refills: 0 | Status: COMPLETED | OUTPATIENT
Start: 2018-02-27 | End: 2018-02-27

## 2018-02-27 RX ORDER — VANCOMYCIN HCL 1 G
1000 VIAL (EA) INTRAVENOUS ONCE
Qty: 0 | Refills: 0 | Status: COMPLETED | OUTPATIENT
Start: 2018-02-27 | End: 2018-02-27

## 2018-02-27 RX ORDER — ENOXAPARIN SODIUM 100 MG/ML
40 INJECTION SUBCUTANEOUS DAILY
Qty: 0 | Refills: 0 | Status: DISCONTINUED | OUTPATIENT
Start: 2018-02-27 | End: 2018-02-27

## 2018-02-27 RX ORDER — TACROLIMUS 5 MG/1
2 CAPSULE ORAL
Qty: 0 | Refills: 0 | Status: DISCONTINUED | OUTPATIENT
Start: 2018-02-27 | End: 2018-02-27

## 2018-02-27 RX ADMIN — Medication 11.65 MICROGRAM(S)/KG/MIN: at 08:34

## 2018-02-27 RX ADMIN — TACROLIMUS 4 MILLIGRAM(S): 5 CAPSULE ORAL at 20:17

## 2018-02-27 RX ADMIN — Medication 300 MILLILITER(S): at 08:40

## 2018-02-27 RX ADMIN — Medication 300 MILLILITER(S): at 08:50

## 2018-02-27 RX ADMIN — INSULIN HUMAN 30 UNIT(S)/HR: 100 INJECTION, SOLUTION SUBCUTANEOUS at 04:10

## 2018-02-27 RX ADMIN — DEXMEDETOMIDINE HYDROCHLORIDE IN 0.9% SODIUM CHLORIDE 9.71 MICROGRAM(S)/KG/HR: 4 INJECTION INTRAVENOUS at 03:05

## 2018-02-27 RX ADMIN — ENOXAPARIN SODIUM 40 MILLIGRAM(S): 100 INJECTION SUBCUTANEOUS at 11:11

## 2018-02-27 RX ADMIN — Medication 28 MILLIGRAM(S): at 06:13

## 2018-02-27 RX ADMIN — FLUCONAZOLE 100 MILLIGRAM(S): 150 TABLET ORAL at 17:18

## 2018-02-27 RX ADMIN — MILRINONE LACTATE 4.66 MICROGRAM(S)/KG/MIN: 1 INJECTION, SOLUTION INTRAVENOUS at 03:05

## 2018-02-27 RX ADMIN — Medication 200 GRAM(S): at 11:11

## 2018-02-27 RX ADMIN — MILRINONE LACTATE 4.66 MICROGRAM(S)/KG/MIN: 1 INJECTION, SOLUTION INTRAVENOUS at 08:33

## 2018-02-27 RX ADMIN — TACROLIMUS 1 MILLIGRAM(S): 5 CAPSULE ORAL at 08:09

## 2018-02-27 RX ADMIN — MYCOPHENOLATE MOFETIL 83.5 GRAM(S): 250 CAPSULE ORAL at 09:00

## 2018-02-27 RX ADMIN — Medication 9.32 MICROGRAM(S)/KG/MIN: at 03:05

## 2018-02-27 RX ADMIN — ARGATROBAN 2.33 MICROGRAM(S)/KG/MIN: 50 INJECTION, SOLUTION INTRAVENOUS at 22:05

## 2018-02-27 RX ADMIN — VASOPRESSIN 6 UNIT(S)/MIN: 20 INJECTION INTRAVENOUS at 08:33

## 2018-02-27 RX ADMIN — TACROLIMUS 1 MILLIGRAM(S): 5 CAPSULE ORAL at 09:36

## 2018-02-27 RX ADMIN — Medication 100 MILLIGRAM(S): at 13:30

## 2018-02-27 RX ADMIN — MEROPENEM 100 MILLIGRAM(S): 1 INJECTION INTRAVENOUS at 17:27

## 2018-02-27 RX ADMIN — Medication 81 MILLIGRAM(S): at 11:11

## 2018-02-27 RX ADMIN — Medication 100 MILLIGRAM(S): at 22:05

## 2018-02-27 RX ADMIN — Medication 100 MILLIGRAM(S): at 06:09

## 2018-02-27 RX ADMIN — Medication 20 MILLIEQUIVALENT(S): at 17:06

## 2018-02-27 RX ADMIN — PANTOPRAZOLE SODIUM 40 MILLIGRAM(S): 20 TABLET, DELAYED RELEASE ORAL at 11:11

## 2018-02-27 RX ADMIN — Medication 10 MILLIGRAM(S): at 19:30

## 2018-02-27 RX ADMIN — VASOPRESSIN 6 UNIT(S)/MIN: 20 INJECTION INTRAVENOUS at 03:05

## 2018-02-27 RX ADMIN — Medication 250 MILLIGRAM(S): at 18:58

## 2018-02-27 RX ADMIN — Medication 100 MILLIGRAM(S): at 14:35

## 2018-02-27 RX ADMIN — MEROPENEM 100 MILLIGRAM(S): 1 INJECTION INTRAVENOUS at 05:26

## 2018-02-27 RX ADMIN — MYCOPHENOLATE MOFETIL 1000 MILLIGRAM(S): 250 CAPSULE ORAL at 20:14

## 2018-02-27 RX ADMIN — Medication 28 MILLIGRAM(S): at 18:58

## 2018-02-27 NOTE — OCCUPATIONAL THERAPY INITIAL EVALUATION ADULT - LIVES WITH, PROFILE
Per PT note: 67 year old male who PTA was living in a  with brother, 3 steps to enter and 2nd floor bedroom., PTA pt was idependent in all functional mobility able to ambulate with a cane and walker. brother helps out from time to time, however pt reports going to the senior center to stay active and taking care of all his needs.

## 2018-02-27 NOTE — OCCUPATIONAL THERAPY INITIAL EVALUATION ADULT - ADDITIONAL COMMENTS
Xray Chest: Lines and tubes as above.No pneumothorax. Minimal left basilar linear subsegmental atelectasis. Transcutaneous pacing pad projects over the right hemithorax.The heart is stable in size. There are no focal pulmonary consolidations or pneumothorax.Stable bilateral chest tubes and mediastinal drains.Endotracheal tube in place with tip above the bg.Unchanged left IJ approach introducer sheath and central venous catheter.Enteric tube courses below the diaphragm with the side port of the level of the gastroesophageal junction; advancement is advised. S/p sternotomy. Trace left pleural fluid and subsegmental atelectasis at the left lung base. US Renal: Stable left renal cyst. TTE: 1. Left atrial enlargement. Normal left ventricular internal dimensions and wall thicknesses. Normal left ventricular systolic function. Septal motion is consistent with BBB. Decreased right ventricular systolic function. s/p Cardiac transplant  02/23/2018  LVAD explantation, AICD removal

## 2018-02-27 NOTE — CHART NOTE - NSCHARTNOTEFT_GEN_A_CORE
Source: Patient [ x ]    Family [ ]     other [ x ] RN, Comprehensive chart review, pt discussed during CTU rounds     Pt seen for nutrition follow up. Now extubated and diet advanced. Pt awaiting lunch at time of interview and denies GI distress. Reports no difficulty chewing/swallowing. Assisted pt with menu selections for dinner tonight.     Pt seen for nutrition follow up. Chart reviewed- pt with LVAD, admitted with increasing LDH with concern for pump thrombosis; now s/p cardiac transplant, LVAD explant, and AICD removal (POD #3); noted with non-immune mediated graft rejection s/p treatment with plasmapheresis.     Diet : consistent CHO    Admission Weight: 78.9kg  Current Weight: 73.8kg  Weight fluctuations noted, likely due to fluid shifts. Pt with 1+generalized and 2+bilateral hand edema.     Pertinent Medications: MEDICATIONS  (STANDING):  dexmedetomidine Infusion 0.5 MICROgram(s)/kG/Hr (9.713 mL/Hr) IV Continuous <Continuous>  DOBUTamine Infusion 5 MICROgram(s)/kG/Min (11.655 mL/Hr) IV Continuous <Continuous>  docusate sodium 100 milliGRAM(s) Oral three times a day  enoxaparin Injectable 40 milliGRAM(s) SubCutaneous daily  EPINEPHrine 8 milliGRAM(s),sodium chloride 250 milliLiter(s) 8 milliGRAM(s) (11 mL/Hr) IV Continuous <Continuous>  fluconAZOLE IVPB      fluconAZOLE IVPB 200 milliGRAM(s) IV Intermittent every 24 hours  insulin Infusion 30 Unit(s)/Hr (30 mL/Hr) IV Continuous <Continuous>  meropenem  IVPB      meropenem  IVPB 1000 milliGRAM(s) IV Intermittent every 12 hours  methylPREDNISolone sodium succinate Injectable 28 milliGRAM(s) IV Push two times a day  metroNIDAZOLE  IVPB      metroNIDAZOLE  IVPB 500 milliGRAM(s) IV Intermittent every 8 hours  milrinone Infusion 0.2 MICROgram(s)/kG/Min (4.662 mL/Hr) IV Continuous <Continuous>  mycophenolate mofetil 1000 milliGRAM(s) Oral <User Schedule>  pantoprazole  Injectable 40 milliGRAM(s) IV Push daily  sodium chloride 0.9%. 1000 milliLiter(s) (10 mL/Hr) IV Continuous <Continuous>  tacrolimus 3 milliGRAM(s) Oral <User Schedule>  vasopressin Infusion 0.1 Unit(s)/Min (6 mL/Hr) IV Continuous <Continuous>    MEDICATIONS  (PRN):    Pertinent Labs:    Complete Blood Count (02.27.18 @ 02:49)    Hemoglobin: 9.9 g/dL - low    Hematocrit: 29.2 % - low  Basic Metabolic Panel - STAT (02.27.18 @ 12:40)    Sodium, Serum: 139 mmol/L    Potassium, Serum: 3.7 mmol/L    Chloride, Serum: 99 mmol/L    Carbon Dioxide, Serum: 26 mmol/L    Anion Gap, Serum: 14 mmol/L    Blood Urea Nitrogen, Serum: 50 mg/dL - high    Creatinine, Serum: 1.39 mg/dL - high    Glucose, Serum: 104 mg/dL - high    Calcium, Total Serum: 8.1 mg/dL  Comprehensive Metabolic Panel (02.27.18 @ 02:49)    Protein Total, Serum: 5.8 g/dL - low    Albumin, Serum: 3.2 g/dL - low    Bilirubin Total, Serum: 0.7 mg/dL    Alkaline Phosphatase, Serum: 59 U/L    Aspartate Aminotransferase (AST/SGOT): 29 U/L    Alanine Aminotransferase (ALT/SGPT): 16 U/L     Point of Care Testing BG:(2/27), (2/26)      Skin: No pressure ulcers noted.      Estimated Needs:   [ x ] no change since previous assessment  [ ] recalculated:       Previous Nutrition Diagnosis:   Limited adherence to nutrition related recommendations ongoing.  Inadequate oral intake and Increased Nutrient Needs ongoing, addressed with diet advancement.        New Nutrition Diagnosis: [ x ] not applicable    Interventions:   1. Recommend change diet to consistent CHO, low sodium.   2. Recommend Ensure Enlive 8 ounces 2 times/day to promote PO intake.   3. Provide food preferences within therapeutic diet when requested.  4. Encourage PO intake with all meals.  5. Reviewed alternate menu options and menu ordering procedure.   6. Discussed importance of increased PO intake and protein rich foods postoperatively.   7. RD to follow up with diet instruction as appropriate.     D/w team.        Monitoring and Evaluation:     [ x ] PO intake [ x ] Tolerance to diet prescription [ x ] weights [ x ] follow up per protocol    [ ] other:

## 2018-02-27 NOTE — OCCUPATIONAL THERAPY INITIAL EVALUATION ADULT - PERTINENT HX OF CURRENT PROBLEM, REHAB EVAL
67M PMH Chronic Systolic Heart Failure (ACC/AHA Stage D) due to NICMP s/p LVAD 6/12/17, GIB s/p Cautery (7/25/2017), known AV Malformations, Chronic Anemia due to numerous GIBs (off AC), A-Fib, VT s/p AICD. Pt admitted due to elevated LDH level of 646 for further evaluation and observation.

## 2018-02-27 NOTE — PROGRESS NOTE ADULT - SUBJECTIVE AND OBJECTIVE BOX
Behavioral Cardiology Progress Note     HPI:  Mr. 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 Was found to have b/l IJ/subclavian thrombi. Extubated 2/27.     Behavioral Health Assessment:   Current stressors:   Hospitalization   s/p Heart transplant (2/23/18)      Support system/family support: Good support from family and close friend      Coping strategies: Talking with family and staff, watching TV, his pastora, talking to his granddaughter     Understanding of medical illness and treatment plan:  Understands reasons for this admission and treatment plan.  Good understanding of heart transplant education including need for lifelong immunosuppressant medication.  Benefits from frequent review and teach-back of all education.     MSE: Awake and alert. Well related with good eye contact. Thought process goal directed. No abnormal thought content; denies s/i.  Mood upbeat.  Affect full range.  Insight and judgment adequate.

## 2018-02-27 NOTE — PROGRESS NOTE ADULT - ASSESSMENT
Awake and alert.  Upbeat and smiling, feeling "good."  Denies pain. Enjoying support from staff members stopping by to see him. Brother Nawaf and friend Tahira very involved in his care; scheduled to visit later today. Receptive to support and validation.     DX:  Systolic heart failure; r/o Adjustment disorder     Recommendations:   Behavioral Cardiology will follow as needed

## 2018-02-27 NOTE — PROGRESS NOTE ADULT - SUBJECTIVE AND OBJECTIVE BOX
Interval History:  extubated today  feels well!   denies pain/SOB    Medications:  DOBUTamine Infusion 5 MICROgram(s)/kG/Min IV Continuous <Continuous>  docusate sodium 100 milliGRAM(s) Oral three times a day  enoxaparin Injectable 40 milliGRAM(s) SubCutaneous daily  EPINEPHrine 8 milliGRAM(s),sodium chloride 250 milliLiter(s) 8 milliGRAM(s) IV Continuous <Continuous>  fluconAZOLE IVPB      fluconAZOLE IVPB 200 milliGRAM(s) IV Intermittent every 24 hours  insulin Infusion 30 Unit(s)/Hr IV Continuous <Continuous>  meropenem  IVPB      meropenem  IVPB 1000 milliGRAM(s) IV Intermittent every 12 hours  methylPREDNISolone sodium succinate Injectable 28 milliGRAM(s) IV Push two times a day  metroNIDAZOLE  IVPB      metroNIDAZOLE  IVPB 500 milliGRAM(s) IV Intermittent every 8 hours  milrinone Infusion 0.1 MICROgram(s)/kG/Min IV Continuous <Continuous>  mycophenolate mofetil 1000 milliGRAM(s) Oral <User Schedule>  pantoprazole  Injectable 40 milliGRAM(s) IV Push daily  sodium chloride 0.9%. 1000 milliLiter(s) IV Continuous <Continuous>  tacrolimus 4 milliGRAM(s) Oral <User Schedule>  vancomycin  IVPB 1000 milliGRAM(s) IV Intermittent once  vasopressin Infusion 0.1 Unit(s)/Min IV Continuous <Continuous>    Vitals:  T(C): 36.6 (18 @ 17:00), Max: 36.7 (18 @ 03:00)  HR: 108 (18 @ 17:45) (101 - 109)  ABP: 103/59 (18 @ 17:45) (81/43 - 163/80)  ABP(mean): 72 (18 @ 17:45) (54 - 106)  RR: 30 (18 @ 17:45) (0 - 30)  SpO2: 88% (18 @ 17:45) (88% - 100%)  CO: 8.2 (18 @ 16:00) (5.1 - 8.2)  CI: 4.2 (18 @ 16:00) (2.6 - 4.2)  PA: 51/25 (18 @ 17:45) (19/4 - )  PA(mean): 34 (18 @ 17:45) (9 - 51)  SVR: 594 (18 @ 16:00) (594 - 1167)    Daily     Daily Weight in k.8 (2018 00:00)    I&O's Summary    2018 07:  -  2018 07:00  --------------------------------------------------------  IN: 3032.7 mL / OUT: 4120 mL / NET: -1087.3 mL    2018 07:01  -  2018 18:27  --------------------------------------------------------  IN: 956.1 mL / OUT: 570 mL / NET: 386.1 mL    Physical Exam:  Appearance: No Acute Distress. Facial swelling  HEENT: No JVD  Cardiovascular: tachycardic, rub  Respiratory: Clear to auscultation bilaterally  Gastrointestinal: Soft, Non-tender	  Skin: No cyanosis	  Neurologic: Non-focal  Extremities: No LE edema  Psychiatry: A & O x 3, Mood & affect appropriate    Labs:                        8.6    26.5  )-----------( 138      ( 2018 14:54 )             25.1         139  |  99  |  50<H>  ----------------------------<  104<H>  3.7   |  26  |  1.39<H>    Ca    8.1<L>      2018 12:40  Phos  3.8       Mg     2.0         TPro  5.8<L>  /  Alb  3.2<L>  /  TBili  0.7  /  DBili  x   /  AST  29  /  ALT  16  /  AlkPhos  59      PT/INR - ( 2018 14:54 )   PT: 12.1 sec;   INR: 1.12 ratio       PTT - ( 2018 14:54 )  PTT:25.5 sec    Serum Pro-Brain Natriuretic Peptide: 3895 pg/mL ( @ 02:49)  Serum Pro-Brain Natriuretic Peptide: 9103 pg/mL ( @ 02:14)    Oxygen Saturation, Mixed: 52 % ( @ 18:21)  Oxygen Saturation, Mixed: 63 % ( @ 16:20)  Oxygen Saturation, Mixed: 59 % ( @ 14:13)  Oxygen Saturation, Mixed: 51 % ( @ 12:18)  Oxygen Saturation, Mixed: 54 % ( @ 10:07)  Oxygen Saturation, Mixed: 54 % ( @ 09:15)    Lactate Dehydrogenase, Serum: 500 U/L ( @ 02:14)  Lactate Dehydrogenase, Serum: 527 U/L ( @ 19:02)  Lactate Dehydrogenase, Serum: 550 U/L ( @ 01:24)    TELEMETRY:  sinus tach

## 2018-02-27 NOTE — OCCUPATIONAL THERAPY INITIAL EVALUATION ADULT - RANGE OF MOTION EXAMINATION, UPPER EXTREMITY
shoulders to 90* only 2* to sternal precautions/bilateral UE Active ROM was WFL  (within functional limits)

## 2018-02-27 NOTE — OCCUPATIONAL THERAPY INITIAL EVALUATION ADULT - ANTICIPATED DISCHARGE DISPOSITION, OT EVAL
Home with home OT for home safety evaluation, functional mobility, balance and ADLs. Home with supervision/assist for all functional activities and ADLs.

## 2018-02-27 NOTE — PROGRESS NOTE ADULT - SUBJECTIVE AND OBJECTIVE BOX
BILLY RUSSELL   MRN#: [id number]     The patient is a 67y Male who was seen, evaluated, & examined with the CTICU staff on rounds and later in the day with a multidisciplinary care plan formulated & implemented.  All available clinical, laboratory, radiographic, pharmacologic, and electrocardiographic data were reviewed & analyzed.      The patient was in the CTICU in critical condition secondary to resolving acute hypoxic respiratory failure-persistent cardiopulmonary dysfunction, resolved hyperlactatemia-acidosis, stress hyperglycemia, and cardiogenic shock-cardiovascular dysfunction-S/P heart transplant with graft dysfunction.     Respiratory status required supplemental oxygen,, the following of ABG’s with A-line monitoring, continuous pulse oximetry monitoring, and inhaled Nitric oxide for support & to evaluate for & prevent further decompensation secondary to persistent cardiopulmonary dysfunction and cardiogenic shock-cardiovascular dysfunction-S/P heart transplant with graft dysfunction.      Invasive hemodynamic monitoring with a PA catheter and an A-line were required for the following of serial MVO2/CI and continuous MAP/BP monitoring to ensure adequate cardiovascular support and to evaluate for & help prevent decompensation while receiving an IV Levophed drip, an IV Epinephrine drip, inhaled Nitric oxide, IV Primacor drip & intermittent Albumin secondary to resolving hemodynamically significant anemia/hypovolemia-shock, resolved hyperlactatemia-acidosis,  & cardiogenic shock-cardiovascular dysfunction-S/P heart transplant with graft dysfunction.    Metabolic stability, stress hyperglycemia, & infection prophylaxis required an IV regular Insulin drip & the following of serial glucose levels to help achieve & maintain euglycemia.      Patient required more than the usual postoperative critical care management and I provided 140 minutes of non-continuous care to the patient.  Discussed at length with the CTICU staff and helped coordinate care. BILLY RUSSELL   MRN#: [id number]     The patient is a 67y Male who was seen, evaluated, & examined with the CTICU staff on rounds and later in the day with a multidisciplinary care plan formulated & implemented.  All available clinical, laboratory, radiographic, pharmacologic, and electrocardiographic data were reviewed & analyzed.      The patient was in the CTICU in critical condition secondary to resolving acute hypoxic respiratory failure-persistent cardiopulmonary dysfunction, resolved hyperlactatemia-acidosis, stress hyperglycemia, and cardiogenic shock-cardiovascular dysfunction-S/P heart transplant with graft dysfunction.     Respiratory status required supplemental oxygen,, the following of ABG’s with A-line monitoring, continuous pulse oximetry monitoring, and inhaled Nitric oxide for support & to evaluate for & prevent further decompensation secondary to persistent cardiopulmonary dysfunction and cardiogenic shock-cardiovascular dysfunction-S/P heart transplant with graft dysfunction.      Invasive hemodynamic monitoring with a PA catheter and an A-line were required for the following of serial MVO2/CI and continuous MAP/BP monitoring to ensure adequate cardiovascular support and to evaluate for & help prevent decompensation while receiving an IV Levophed drip, an IV Epinephrine drip, inhaled Nitric oxide, IV Primacor drip & intermittent Albumin secondary to resolving hemodynamically significant anemia/hypovolemia-shock, resolved hyperlactatemia-acidosis,  & cardiogenic shock-cardiovascular dysfunction-S/P heart transplant with graft dysfunction.  Patient will require IV Heparin drip for upper extremity DVT's pending HIT antibody assay.    Metabolic stability, stress hyperglycemia, & infection prophylaxis required an IV regular Insulin drip & the following of serial glucose levels to help achieve & maintain euglycemia.      Patient required more than the usual postoperative critical care management and I provided 140 minutes of non-continuous care to the patient.  Discussed at length with the CTICU staff and helped coordinate care.

## 2018-02-27 NOTE — PROGRESS NOTE ADULT - SUBJECTIVE AND OBJECTIVE BOX
BILLY RUSSELL   MRN#: [id number]     The patient is a 67y Male with PMH of end stage heart failure s/p Heart Mate II LVAD June 2017 complicated by pump thrombosis and recurrent GI bleed, s/p orthotopic heart transplant 2/23 complicated by clotting of his right IJ, innominate and bilateral subclavian veins as well as possible hyperacute graft dysfunction 2/23 who was seen, evaluated, & examined with the CTICU staff on rounds and later in the day with a multidisciplinary care plan formulated & implemented.  All available clinical, laboratory, radiographic, pharmacologic, and electrocardiographic data were reviewed & analyzed.      The patient was in the CTICU in critical condition secondary to resolving acute hypoxic respiratory failure-persistent cardiopulmonary dysfunction, resolved hyperlactatemia-acidosis, stress hyperglycemia, and cardiogenic shock-cardiovascular dysfunction-S/P heart transplant with graft dysfunction.     Respiratory status required supplemental oxygen,, the following of ABG’s with A-line monitoring, continuous pulse oximetry monitoring, inhaled Nitric oxide with the addition of high flow oxygen for support & to evaluate for & prevent further decompensation secondary to persistent cardiopulmonary dysfunction and cardiogenic shock-cardiovascular dysfunction-S/P heart transplant with graft dysfunction.      Invasive hemodynamic monitoring with a PA catheter and an A-line were required for the following of serial MVO2/CI and continuous MAP/BP monitoring to ensure adequate cardiovascular support and to evaluate for & help prevent decompensation while receiving an IV Levophed drip, titrating an IV Epinephrine drip, inhaled Nitric oxide, IV Primacor drip, IV Dobutamine & intermittent IV Lasix secondary to resolving hemodynamically significant anemia/hypovolemia-shock, resolved hyperlactatemia-acidosis,  & cardiogenic shock-cardiovascular dysfunction-S/P heart transplant with graft dysfunction.  Patient will require IV Heparin drip for upper extremity DVT's pending HIT antibody assay.    Metabolic stability, stress hyperglycemia, & infection prophylaxis required an IV regular Insulin drip & the following of serial glucose levels to help achieve & maintain euglycemia.      Patient required more than the usual postoperative critical care management and I provided 65 minutes of non-continuous care to the patient.  Discussed at length with the CTICU staff and helped coordinate care.

## 2018-02-27 NOTE — PROGRESS NOTE ADULT - ASSESSMENT
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 Patient on broad federica-operative prophylaxis to Vancomycin/Meropenem/Fluconazole based upon his prior LVAD placement and prolonged hospitalization pre-transplant. Intermediate risk for CMV (seropositive); intermediate risk for toxoplasmosis (seropositive R). Will need to start PPX when able to tolerate PO, stabilization of Cr. Suspect leukocytosis is reactive, not indicative of infection; however patient high risk, prolonged hospitalization, LVAD now S/p cardiac transplant, CMV/toxo serology positive.    -  - Bactrim (PCP PPX)/Valcyte (CMV PPX) to be added when Cr improved, able to tolerate PO  - Will need to follow up on donor serologies for CMV, HCV viral load and genotype    - Cultures are NGTD, WBC remains elevated but in the setting of stress dose steroids, and improving overall condition - Would consider discontinuing the antibiotics    Gary Lujan MD  [phone]  After 5pm/weekends [phone] 66 yo man s/p prior LVAD placement, no infections related to the LVAD prior to undergoing an orthotopic heart transplant 2/23 from a reportedly high risk donor HCV. Patient on broad federica-operative prophylaxis to Vancomycin/Meropenem/Fluconazole based upon his prior LVAD placement and prolonged hospitalization pre-transplant. Intermediate risk for CMV (seropositive); intermediate risk for toxoplasmosis (seropositive R). Will need to start PPX when able to tolerate PO, stabilization of Cr. Suspect leukocytosis is reactive, not indicative of infection; however patient high risk, prolonged hospitalization, LVAD now S/p cardiac transplant, CMV/toxo serology positive.    -  - Bactrim (PCP PPX)/Valcyte (CMV PPX) to be added when Cr improved, able to tolerate PO  - Will need to follow up on donor serologies for CMV, HCV viral load and donor HCV genotype is 1a  - Please send HCV viral load for the recipient (Mr. Baez)  -    - Cultures are NGTD, WBC remains elevated but in the setting of stress dose steroids, and improving overall condition - Would consider discontinuing the antibiotics    Gary Lujan MD  [phone]  After 5pm/weekends [phone]

## 2018-02-27 NOTE — AIRWAY REMOVAL NOTE  ADULT & PEDS - ARTIFICAL AIRWAY REMOVAL COMMENTS
Written order for extubation verified. The patient was identified by full name and birth date compared to the identification band. Present during the procedure was DIXON Saucedo.

## 2018-02-27 NOTE — PROGRESS NOTE ADULT - SUBJECTIVE AND OBJECTIVE BOX
INFECTIOUS DISEASES FOLLOW UP-- Sujey Lujan  [phone]    This is a follow up note for this  67yMale with s/p heart transplant, clinically improving over the past 24 hours extubated, speaking in full sentences sitting up in bed      ROS:  CONSTITUTIONAL:  No fever,  CARDIOVASCULAR:  No chest pain or palpitations  RESPIRATORY:  No dyspnea  GASTROINTESTINAL:  No nausea, vomiting, diarrhea, or abdominal pain  GENITOURINARY:  No dysuria  NEUROLOGIC:  No headache,     Allergies    No Known Allergies    Intolerances        ANTIBIOTICS/RELEVANT:  antimicrobials  fluconAZOLE IVPB      fluconAZOLE IVPB 200 milliGRAM(s) IV Intermittent every 24 hours  meropenem  IVPB      meropenem  IVPB 1000 milliGRAM(s) IV Intermittent every 12 hours  metroNIDAZOLE  IVPB      metroNIDAZOLE  IVPB 500 milliGRAM(s) IV Intermittent every 8 hours  vancomycin  IVPB 1000 milliGRAM(s) IV Intermittent once    immunologic:  mycophenolate mofetil 1000 milliGRAM(s) Oral <User Schedule>  tacrolimus 4 milliGRAM(s) Oral <User Schedule>    OTHER:  DOBUTamine Infusion 5 MICROgram(s)/kG/Min IV Continuous <Continuous>  docusate sodium 100 milliGRAM(s) Oral three times a day  enoxaparin Injectable 40 milliGRAM(s) SubCutaneous daily  EPINEPHrine 8 milliGRAM(s),sodium chloride 250 milliLiter(s) 8 milliGRAM(s) IV Continuous <Continuous>  insulin Infusion 30 Unit(s)/Hr IV Continuous <Continuous>  methylPREDNISolone sodium succinate Injectable 28 milliGRAM(s) IV Push two times a day  milrinone Infusion 0.1 MICROgram(s)/kG/Min IV Continuous <Continuous>  pantoprazole  Injectable 40 milliGRAM(s) IV Push daily  sodium chloride 0.9%. 1000 milliLiter(s) IV Continuous <Continuous>  vasopressin Infusion 0.1 Unit(s)/Min IV Continuous <Continuous>      Objective:  Vital Signs Last 24 Hrs  T(C): 36.5 (27 Feb 2018 18:00), Max: 36.7 (27 Feb 2018 03:00)  T(F): 97.7 (27 Feb 2018 18:00), Max: 98.1 (27 Feb 2018 03:00)  HR: 108 (27 Feb 2018 18:15) (101 - 109)  BP: --  BP(mean): --  RR: 21 (27 Feb 2018 18:15) (0 - 30)  SpO2: 93% (27 Feb 2018 18:15) (88% - 100%)    PHYSICAL EXAM:  Constitutional:no acute distress  Eyes:WALT, EOMI  Ear/Nose/Throat: no oral lesions, 	  Respiratory: clear BL  Cardiovascular: S1S2  Gastrointestinal:soft, (+) BS, no tenderness  Extremities:no e/e/c  No Lymphadenopathy  IV sites not inflammed.    LABS:                        8.6    26.5  )-----------( 138      ( 27 Feb 2018 14:54 )             25.1     02-27    139  |  99  |  50<H>  ----------------------------<  104<H>  3.7   |  26  |  1.39<H>    Ca    8.1<L>      27 Feb 2018 12:40  Phos  3.8     02-27  Mg     2.0     02-26    TPro  5.8<L>  /  Alb  3.2<L>  /  TBili  0.7  /  DBili  x   /  AST  29  /  ALT  16  /  AlkPhos  59  02-27    PT/INR - ( 27 Feb 2018 14:54 )   PT: 12.1 sec;   INR: 1.12 ratio         PTT - ( 27 Feb 2018 14:54 )  PTT:25.5 sec      MICROBIOLOGY:            RECENT CULTURES:  02-23 @ 16:56  Pericardial Pericardial Fluid  --  --  --    No growth to date.  --      RADIOLOGY & ADDITIONAL STUDIES:    < from: Xray Chest 1 View- PORTABLE-Routine (02.27.18 @ 04:08) >  omparison: 2/26/2018.     The mediastinal cardiac silhouette is unremarkable. Sternotomy. ET tube   with tip between level of clavicles and bg. Nasogastric tube can be   traced to below the hemidiaphragms. Inferior approach [place] catheter   with tipin right pulmonary artery. Right cardiac pacer pad overlies   right lung apex.    The lungs are clear.     No acute osseous finding.     IMPRESSION:    As above.    < end of copied text >

## 2018-02-27 NOTE — CONSULT NOTE ADULT - ASSESSMENT
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 Was found to have b/l IJ/ subclavian thrombi. 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     1. Bilateral DVT: Acute, occlusive DVT of the right internal jugular, innominate, and subclavian veins and acute, occlusive DVT of the left subclavian vein along with superficial thrombosis of the right cephalic vein.     DVT appears acute and there is a high risk of PE due to the extension of the DVT into the subclavian vein. Would recommend anticoagulation with Heparin drip or argatroban if HIT panel positive.     Head elevation and bilateral arm elevation will help the edema decrease.     Will follow

## 2018-02-27 NOTE — CONSULT NOTE ADULT - SUBJECTIVE AND OBJECTIVE BOX
Patient seen and evaluated @ bedside   Chief Complaint: DVT    HPI:  67M PMH Chronic Systolic Heart Failure (ACC/AHA Stage D) due to NICMP s/p LVAD 17, GIB s/p Cautery (2017), known AV Malformations, Chronic Anemia due to numerous GIBs (off AC), A-Fib, VT s/p AICD. Pt admitted due to elevated LDH level of 646 for further evaluation and observation.  . Pt denies any chest pain, SOB, palpitations, N/D/V, abdominal pain, headaches, changes in vision, dizziness, pain or burning while urinating, swelling, numbness/tingling anywhere, rashes, fever, or recent travel. (2018 19:36)    PMH:   GIB (gastrointestinal bleeding)  H/O prior ablation treatment  Ventricular fibrillation  PAF (paroxysmal atrial fibrillation)  Non-Ischemic Cardiomyopathy  SVT (Supraventricular Tachycardia)  HTN  CHF (Congestive Heart Failure)    PSH:   LVAD (left ventricular assist device) present  Status post left hip replacement  Hypertension  Hypertension  History of Prior Ablation Treatment  AICD (Automatic Cardioverter/Defibrillator) Present    Medications:   albumin human 25% IVPB 50 milliLiter(s) IV Intermittent every 10 minutes  aspirin enteric coated 81 milliGRAM(s) Oral daily  dexmedetomidine Infusion 0.5 MICROgram(s)/kG/Hr IV Continuous <Continuous>  DOBUTamine Infusion 5 MICROgram(s)/kG/Min IV Continuous <Continuous>  docusate sodium 100 milliGRAM(s) Oral three times a day  enoxaparin Injectable 40 milliGRAM(s) SubCutaneous daily  EPINEPHrine 8 milliGRAM(s),sodium chloride 250 milliLiter(s) 8 milliGRAM(s) IV Continuous <Continuous>  fluconAZOLE IVPB      fluconAZOLE IVPB 200 milliGRAM(s) IV Intermittent every 24 hours  insulin Infusion 30 Unit(s)/Hr IV Continuous <Continuous>  meropenem  IVPB      meropenem  IVPB 1000 milliGRAM(s) IV Intermittent every 12 hours  methylPREDNISolone sodium succinate Injectable 28 milliGRAM(s) IV Push two times a day  metroNIDAZOLE  IVPB      metroNIDAZOLE  IVPB 500 milliGRAM(s) IV Intermittent every 8 hours  milrinone Infusion 0.2 MICROgram(s)/kG/Min IV Continuous <Continuous>  mycophenolate mofetil 1000 milliGRAM(s) Oral <User Schedule>  mycophenolate mofetil IVPB 1 Gram(s) IV Intermittent once  pantoprazole  Injectable 40 milliGRAM(s) IV Push daily  sodium chloride 0.9%. 1000 milliLiter(s) IV Continuous <Continuous>  tacrolimus 2 milliGRAM(s) Oral <User Schedule>  vasopressin Infusion 0.1 Unit(s)/Min IV Continuous <Continuous>    Allergies:  No Known Allergies    FAMILY HISTORY:  No pertinent family history in first degree relatives    Social History:  Smoking:  Alcohol:  Drugs:    Review of Systems:  [X ] 10 point review of systems is otherwise negative except as mentioned above       Physical Exam:  T(C): 36.7 (18 @ 03:00), Max: 36.7 (18 @ 03:00)  HR: 104 (18 @ 10:45) (95 - 107)  BP: --  RR: 14 (18 @ 10:45) (0 - 21)  SpO2: 100% (18 @ 10:45) (98% - 100%)  Wt(kg): --     @ 07:01  -   @ 07:00  --------------------------------------------------------  IN: 3032.7 mL / OUT: 4120 mL / NET: -1087.3 mL     @ 07:01  -   @ 11:43  --------------------------------------------------------  IN: 174.3 mL / OUT: 110 mL / NET: 64.3 mL    Daily     Daily Weight in k.8 (2018 00:00)    Appearance: [ ] Normal [ ] NAD  Eyes: [ ] PERRL [ ] EOMI  HENT: [ ] Normal oral muscosa [ ]NC/AT  Cardiovascular: [ ] S1 [ ] S2 [ ] RRR [ ] No m/r/g [ ]No edema [ ] JVP  Procedural Access Site: [ ] No hematoma [ ] Non-tender to palpation [ ] 2+ pulse [ ] No bruit [ ] No Ecchymosis  Respiratory: [ ] Clear to auscultation bilaterally  Gastrointestinal: [ ] Soft [ ] Non-tender [ ] Non-distended [ ] BS+  Musculoskeletal: [ ] No clubbing [ ] No joint deformity   Neurologic: [ ] Non-focal  Lymphatic: [ ] No lymphadenopathy  Psychiatry: [ ] AAOx3 [ ] Mood & affect appropriate  Skin: [ ] No rashes [ ] No ecchymoses [ ] No cyanosis    Cardiovascular Diagnostic Testing:  ECG:    Echo:    Stress Testing:    Cath:    Interpretation of Telemetry:    Imaging:    Labs:                        9.9    31.1  )-----------( 142      ( 2018 02:49 )             29.2         137  |  97  |  43<H>  ----------------------------<  126<H>  4.2   |  29  |  1.45<H>    Ca    7.8<L>      2018 02:49  Phos  3.8       Mg     2.0         TPro  5.8<L>  /  Alb  3.2<L>  /  TBili  0.7  /  DBili  x   /  AST  29  /  ALT  16  /  AlkPhos  59      PT/INR - ( 2018 02:49 )   PT: 11.6 sec;   INR: 1.06 ratio         PTT - ( 2018 02:49 )  PTT:24.0 sec      Serum Pro-Brain Natriuretic Peptide: 3895 pg/mL ( @ 02:49)  Serum Pro-Brain Natriuretic Peptide: 9103 pg/mL ( @ 02:14)        Thyroid Stimulating Hormone, Serum: 1.48 uIU/mL ( @ 08:13) Patient seen and evaluated @ bedside   Chief Complaint: DVT    HPI:  67M PMH Chronic Systolic Heart Failure (ACC/AHA Stage D) due to NICMP s/p LVAD 17, GIB s/p Cautery (2017), known AV Malformations, Chronic Anemia due to numerous GIBs (off AC), A-Fib, VT s/p AICD. Pt admitted due to elevated LDH level of 646 for further evaluation and observation.  . Pt denies any chest pain, SOB, palpitations, N/D/V, abdominal pain, headaches, changes in vision, dizziness, pain or burning while urinating, swelling, numbness/tingling anywhere, rashes, fever, or recent travel. (2018 19:36)    PMH:   GIB (gastrointestinal bleeding)  H/O prior ablation treatment  Ventricular fibrillation  PAF (paroxysmal atrial fibrillation)  Non-Ischemic Cardiomyopathy  SVT (Supraventricular Tachycardia)  HTN  CHF (Congestive Heart Failure)    PSH:   LVAD (left ventricular assist device) present  Status post left hip replacement  Hypertension  Hypertension  History of Prior Ablation Treatment  AICD (Automatic Cardioverter/Defibrillator) Present    Medications:   albumin human 25% IVPB 50 milliLiter(s) IV Intermittent every 10 minutes  aspirin enteric coated 81 milliGRAM(s) Oral daily  dexmedetomidine Infusion 0.5 MICROgram(s)/kG/Hr IV Continuous <Continuous>  DOBUTamine Infusion 5 MICROgram(s)/kG/Min IV Continuous <Continuous>  docusate sodium 100 milliGRAM(s) Oral three times a day  enoxaparin Injectable 40 milliGRAM(s) SubCutaneous daily  EPINEPHrine 8 milliGRAM(s),sodium chloride 250 milliLiter(s) 8 milliGRAM(s) IV Continuous <Continuous>  fluconAZOLE IVPB      fluconAZOLE IVPB 200 milliGRAM(s) IV Intermittent every 24 hours  insulin Infusion 30 Unit(s)/Hr IV Continuous <Continuous>  meropenem  IVPB      meropenem  IVPB 1000 milliGRAM(s) IV Intermittent every 12 hours  methylPREDNISolone sodium succinate Injectable 28 milliGRAM(s) IV Push two times a day  metroNIDAZOLE  IVPB      metroNIDAZOLE  IVPB 500 milliGRAM(s) IV Intermittent every 8 hours  milrinone Infusion 0.2 MICROgram(s)/kG/Min IV Continuous <Continuous>  mycophenolate mofetil 1000 milliGRAM(s) Oral <User Schedule>  mycophenolate mofetil IVPB 1 Gram(s) IV Intermittent once  pantoprazole  Injectable 40 milliGRAM(s) IV Push daily  sodium chloride 0.9%. 1000 milliLiter(s) IV Continuous <Continuous>  tacrolimus 2 milliGRAM(s) Oral <User Schedule>  vasopressin Infusion 0.1 Unit(s)/Min IV Continuous <Continuous>    Allergies:  No Known Allergies    FAMILY HISTORY:  No pertinent family history in first degree relatives    Social History:  Smoking:  Alcohol:  Drugs:    Review of Systems:  [X ] 10 point review of systems is otherwise negative except as mentioned above       Physical Exam:  T(C): 36.7 (18 @ 03:00), Max: 36.7 (18 @ 03:00)  HR: 104 (18 @ 10:45) (95 - 107)  BP: --  RR: 14 (18 @ 10:45) (0 - 21)  SpO2: 100% (18 @ 10:45) (98% - 100%)  Wt(kg): --     @ 07:01  -   @ 07:00  --------------------------------------------------------  IN: 3032.7 mL / OUT: 4120 mL / NET: -1087.3 mL     @ 07:01  -   @ 11:43  --------------------------------------------------------  IN: 174.3 mL / OUT: 110 mL / NET: 64.3 mL    Daily     Daily Weight in k.8 (2018 00:00)    Appearance: [ ] Normal [ ] NAD  Eyes: [ ] PERRL [ ] EOMI  HENT: [ ] Normal oral muscosa [ ]NC/AT  Cardiovascular: [ ] S1 [ ] S2 [ ] RRR [ ] No m/r/g [ ]No edema [ ] JVP  Procedural Access Site: [ ] No hematoma [ ] Non-tender to palpation [ ] 2+ pulse [ ] No bruit [ ] No Ecchymosis  Respiratory: [ ] Clear to auscultation bilaterally  Gastrointestinal: [ ] Soft [ ] Non-tender [ ] Non-distended [ ] BS+  Musculoskeletal: [ ] No clubbing [ ] No joint deformity   Neurologic: [ ] Non-focal  Lymphatic: [ ] No lymphadenopathy  Psychiatry: [ ] AAOx3 [ ] Mood & affect appropriate  Skin: [ ] No rashes [ ] No ecchymoses [ ] No cyanosis      Interpretation of Telemetry:    Imaging:  Vein duplex Upper Extremity 2018  IMPRESSION:  Acute, occlusive DVT of the right internal jugular, innominate, and   subclavian veins.  Acute, occlusive DVT of the left subclavian vein.  Superficial thrombosis of the right cephalic vein.    Labs:                    9.9    31.1  )-----------( 142      ( 2018 02:49 )             29.2         137  |  97  |  43<H>  ----------------------------<  126<H>  4.2   |  29  |  1.45<H>    Ca    7.8<L>      2018 02:49  Phos  3.8       Mg     2.0         TPro  5.8<L>  /  Alb  3.2<L>  /  TBili  0.7  /  DBili  x   /  AST  29  /  ALT  16  /  AlkPhos  59      PT/INR - ( 2018 02:49 )   PT: 11.6 sec;   INR: 1.06 ratio         PTT - ( 2018 02:49 )  PTT:24.0 sec      Serum Pro-Brain Natriuretic Peptide: 3895 pg/mL ( @ 02:49)  Serum Pro-Brain Natriuretic Peptide: 9103 pg/mL ( @ 02:14)        Thyroid Stimulating Hormone, Serum: 1.48 uIU/mL ( @ 08:13) Patient seen and evaluated @ bedside   Chief Complaint: DVT    HPI:  67 year old man with ACC/AHA stage D chronic systolic heart failure due to NICM (LVEF 20%) s/p HM2 LVAD  c/b pump thrombus now s/p OHT  with post-op course c/b primary graft dysfuction with biventricular dysfunction s/p plasmapharesis/IVIg with improvement in LV function to 55% but with persistent RV dysfunction.   Unable to place Right IJ Capron-Cassidy catheter at the time of the OR () but eventually he had Left IJ Capron-Cassidy placed. Noted to have bilateral IJ and subclavian DVTs after transplant. Now,  Left IJ Capron-cassidy was removed and placed in the Right femoral vein.     Reports swelling of upper extremities and SOB but overall feels ok.     He has history of GI bleeding due to AVM while LVAD in place.      PMH:   GIB (gastrointestinal bleeding)  H/O prior ablation treatment  Ventricular fibrillation  PAF (paroxysmal atrial fibrillation)  Non-Ischemic Cardiomyopathy  SVT (Supraventricular Tachycardia)  HTN  CHF (Congestive Heart Failure)    PSH:   LVAD (left ventricular assist device) present  Status post left hip replacement  Hypertension  Hypertension  History of Prior Ablation Treatment  AICD (Automatic Cardioverter/Defibrillator) Present    Medications:   albumin human 25% IVPB 50 milliLiter(s) IV Intermittent every 10 minutes  aspirin enteric coated 81 milliGRAM(s) Oral daily  dexmedetomidine Infusion 0.5 MICROgram(s)/kG/Hr IV Continuous <Continuous>  DOBUTamine Infusion 5 MICROgram(s)/kG/Min IV Continuous <Continuous>  docusate sodium 100 milliGRAM(s) Oral three times a day  enoxaparin Injectable 40 milliGRAM(s) SubCutaneous daily  EPINEPHrine 8 milliGRAM(s),sodium chloride 250 milliLiter(s) 8 milliGRAM(s) IV Continuous <Continuous>  fluconAZOLE IVPB      fluconAZOLE IVPB 200 milliGRAM(s) IV Intermittent every 24 hours  insulin Infusion 30 Unit(s)/Hr IV Continuous <Continuous>  meropenem  IVPB      meropenem  IVPB 1000 milliGRAM(s) IV Intermittent every 12 hours  methylPREDNISolone sodium succinate Injectable 28 milliGRAM(s) IV Push two times a day  metroNIDAZOLE  IVPB      metroNIDAZOLE  IVPB 500 milliGRAM(s) IV Intermittent every 8 hours  milrinone Infusion 0.2 MICROgram(s)/kG/Min IV Continuous <Continuous>  mycophenolate mofetil 1000 milliGRAM(s) Oral <User Schedule>  mycophenolate mofetil IVPB 1 Gram(s) IV Intermittent once  pantoprazole  Injectable 40 milliGRAM(s) IV Push daily  sodium chloride 0.9%. 1000 milliLiter(s) IV Continuous <Continuous>  tacrolimus 2 milliGRAM(s) Oral <User Schedule>  vasopressin Infusion 0.1 Unit(s)/Min IV Continuous <Continuous>    Allergies:  No Known Allergies    FAMILY HISTORY:  No pertinent family history in first degree relatives    Social History:  Smoking: No  Alcohol: No  Drugs: No    Review of Systems:  [X ] 10 point review of systems is otherwise negative except as mentioned above       Physical Exam:  T(C): 36.7 (18 @ 03:00), Max: 36.7 (18 @ 03:00)  HR: 104 (18 @ 10:45) (95 - 107)  BP: --  RR: 14 (18 @ 10:45) (0 - 21)  SpO2: 100% (18 @ 10:45) (98% - 100%)  Wt(kg): --     @ 07: @ 07:00  --------------------------------------------------------  IN: 3032.7 mL / OUT: 4120 mL / NET: -1087.3 mL     @ 07:01  -   @ 11:43  --------------------------------------------------------  IN: 174.3 mL / OUT: 110 mL / NET: 64.3 mL    Daily     Daily Weight in k.8 (2018 00:00)    Appearance: On Nasal cannula /Nitric oxide, able to talk with mild SOB  Eyes: [ x] PERRL [x ] EOMI  HEENT: [x ] Normal oral muscosa [x ]NC/AT  Cardiovascular: [x ] S1 [x ] S2, tachycardic,  [ x] No m/r/g  bilateral Upper extremity edema, + JVD, No LE edema  sternotomy site covered with clean dressings  Right femoral vein Capron-cassidy in place.   Respiratory: Decreased BS b/l bases  Gastrointestinal: [x ] Soft [x ] Non-tender [x ] Non-distended [x ] BS+  Musculoskeletal: [x ] No clubbing [x ] No joint deformity   Neurologic: [x ] Non-focal  Lymphatic: [x ] No lymphadenopathy  Psychiatry: [x ] AAOx3 [x ] Mood & affect appropriate  Skin: [x ] No rashes [x ] No ecchymoses [x ] No cyanosis  	  Interpretation of Telemetry: Sinus tachycardia 90--110bpm    Imaging:  Vein duplex Upper Extremity 2018  IMPRESSION:  Acute, occlusive DVT of the right internal jugular, innominate, and   subclavian veins.  Acute, occlusive DVT of the left subclavian vein.  Superficial thrombosis of the right cephalic vein.    Labs:                    9.9    31.1  )-----------( 142      ( 2018 02:49 )             29.2         137  |  97  |  43<H>  ----------------------------<  126<H>  4.2   |  29  |  1.45<H>    Ca    7.8<L>      2018 02:49  Phos  3.8       Mg     2.0         TPro  5.8<L>  /  Alb  3.2<L>  /  TBili  0.7  /  DBili  x   /  AST  29  /  ALT  16  /  AlkPhos  59      PT/INR - ( 2018 02:49 )   PT: 11.6 sec;   INR: 1.06 ratio         PTT - ( 2018 02:49 )  PTT:24.0 sec      Serum Pro-Brain Natriuretic Peptide: 3895 pg/mL ( @ 02:49)  Serum Pro-Brain Natriuretic Peptide: 9103 pg/mL ( @ 02:14)        Thyroid Stimulating Hormone, Serum: 1.48 uIU/mL ( @ 08:13)

## 2018-02-27 NOTE — PROGRESS NOTE ADULT - ASSESSMENT
Mr. Baez is a 67 year old man with ACC/AHA stage D chronic systolic heart failure due to NICM (LVEF 20%) s/p HM2 LVAD  c/b pump thrombus now s/p OHT  (CMV D-/R+ and Toxo D-/R+), with post-op course c/b primary graft dysfuction with biventricular dysfunction, initially thought to be immune-mediated due to positive B-cell flow (negative CDC cross match) and received plasmapharesis/IVIg with improvement in LV function since to 55% with improving RV function; improving hemodynamically on current support. Was found to have b/l IJ/subclavian thrombi. Extubated .     Immunosuppression prophylaxis:  Tacrolimus - started on prograf ; increase to 4 mg sublingual BID this evening at 20:00; please check levels at 7:30 am (stat)  Continue CellCept 1000 mg IV BID. change dosing to 0800 and   Steroids - continue taper; to receive 28 mg IV q12 today, then prednisone 30 mg PO q12h tomorrow morning  	  Antimicrobial prophylaxis:  Intermediate risk CMV - Eventually will start IV ganciclovir or oral valganciclovir when more stable. No urgency.  Intermediate risk Toxo - no prophylaxis needed  PCP - delay start of Bactrim until renal function is stable; will consider by Thursday if continues to have good renal function  On prophylactic abx per Dr. Lujan: likely can come off now that improving and cultures negative; on Fluconazole 200 mg IV daily, Meropenem 1 gm IV q12h, Vancomycin 1 gm IV q12h since he was an LVAD explant.     Graft function:  LV function improved; persistent RV dysfunction however CI stable (thermo appears to be better than Marino indices)  Attempt to wean off epi; transition to  5 and milrinone if possible  maintain CVP 6-10   would be slow to wean off Kamala until inotrope requirement lower  plan for biopsy Thursday    Other prophylaxis:  Protonix 40 mg IV daily for GI prophylaxis while on steroids.    Bilateral IJ/Subclavian thrombi  - appreciate vascular consult  - likely warrant anticoagulation; awaiting HIT Ab and if negative, plan for low dose heparin gtt (goal PTT 40-60); monitor H/H    CAV PPx  - will start ASA when more stable  - statin prior to discharge    Critical Care Time = 120 minutes.

## 2018-02-27 NOTE — PROGRESS NOTE ADULT - SUBJECTIVE AND OBJECTIVE BOX
67y M admitted for a cardiac transplant 2/23/18    Outpatient medication reconciliation reviewed and will be re-started appropriately.  Plan discussed with multidisciplinary team.     Continue current immunosuppressive regimen.    Induction:  Methylprednisolone (SoluMedrol) in the OR then taper to PO prednisone when it is feasible  IV CellCept 1gram in OR    Maintenance:  Tacrolimus (Prograf) adjusted dose based on trough level  Mycophenolate mofetil (Cellcept) 1,000 mg IV twice daily (change to PO when tolerating)  Methylprednisolone IV taper (will switch to prednisone when tolerating PO)    Anti-infective prophylaxis:  Sulfamethoxazole/trimethoprim 800/160 mg (Bactrim DS) 1 tablet PO daily (HOLD)  Valgancyclovir 450 mg 1 tablet PO daily (HOLD)  Fluconazole 200 mg daily    GI/DVT ppx:  Pantoprazole (Protonix) 40 mg 1 tablet PO in the morning   Enoxaparin (Lovenox) 40 mg subcutaneously once a day  Aspirin 81 mg daily

## 2018-02-28 LAB
ALBUMIN SERPL ELPH-MCNC: 3.4 G/DL — SIGNIFICANT CHANGE UP (ref 3.3–5)
ALBUMIN SERPL ELPH-MCNC: 3.6 G/DL — SIGNIFICANT CHANGE UP (ref 3.3–5)
ALP SERPL-CCNC: 54 U/L — SIGNIFICANT CHANGE UP (ref 40–120)
ALP SERPL-CCNC: 59 U/L — SIGNIFICANT CHANGE UP (ref 40–120)
ALT FLD-CCNC: 13 U/L RC — SIGNIFICANT CHANGE UP (ref 10–45)
ALT FLD-CCNC: 15 U/L RC — SIGNIFICANT CHANGE UP (ref 10–45)
ANION GAP SERPL CALC-SCNC: 12 MMOL/L — SIGNIFICANT CHANGE UP (ref 5–17)
ANION GAP SERPL CALC-SCNC: 12 MMOL/L — SIGNIFICANT CHANGE UP (ref 5–17)
APTT BLD: 38.5 SEC — HIGH (ref 27.5–37.4)
APTT BLD: 46 SEC — HIGH (ref 27.5–37.4)
APTT BLD: 58.2 SEC — HIGH (ref 27.5–37.4)
APTT BLD: 64.9 SEC — HIGH (ref 27.5–37.4)
APTT BLD: 65.1 SEC — HIGH (ref 27.5–37.4)
AST SERPL-CCNC: 25 U/L — SIGNIFICANT CHANGE UP (ref 10–40)
AST SERPL-CCNC: 27 U/L — SIGNIFICANT CHANGE UP (ref 10–40)
BASE EXCESS BLDMV CALC-SCNC: -0.4 MMOL/L — SIGNIFICANT CHANGE UP (ref -3–3)
BASE EXCESS BLDMV CALC-SCNC: -0.9 MMOL/L — SIGNIFICANT CHANGE UP (ref -3–3)
BASE EXCESS BLDMV CALC-SCNC: 0 MMOL/L — SIGNIFICANT CHANGE UP (ref -3–3)
BASE EXCESS BLDMV CALC-SCNC: 1.4 MMOL/L — SIGNIFICANT CHANGE UP (ref -3–3)
BASE EXCESS BLDMV CALC-SCNC: 2.4 MMOL/L — SIGNIFICANT CHANGE UP (ref -3–3)
BASE EXCESS BLDMV CALC-SCNC: 2.6 MMOL/L — SIGNIFICANT CHANGE UP (ref -3–3)
BILIRUB SERPL-MCNC: 0.6 MG/DL — SIGNIFICANT CHANGE UP (ref 0.2–1.2)
BILIRUB SERPL-MCNC: 0.6 MG/DL — SIGNIFICANT CHANGE UP (ref 0.2–1.2)
BUN SERPL-MCNC: 45 MG/DL — HIGH (ref 7–23)
BUN SERPL-MCNC: 49 MG/DL — HIGH (ref 7–23)
CALCIUM SERPL-MCNC: 7.9 MG/DL — LOW (ref 8.4–10.5)
CALCIUM SERPL-MCNC: 8 MG/DL — LOW (ref 8.4–10.5)
CHLORIDE SERPL-SCNC: 101 MMOL/L — SIGNIFICANT CHANGE UP (ref 96–108)
CHLORIDE SERPL-SCNC: 102 MMOL/L — SIGNIFICANT CHANGE UP (ref 96–108)
CO2 BLDMV-SCNC: 25 MMOL/L — SIGNIFICANT CHANGE UP (ref 21–29)
CO2 BLDMV-SCNC: 27 MMOL/L — SIGNIFICANT CHANGE UP (ref 21–29)
CO2 BLDMV-SCNC: 28 MMOL/L — SIGNIFICANT CHANGE UP (ref 21–29)
CO2 BLDMV-SCNC: 28 MMOL/L — SIGNIFICANT CHANGE UP (ref 21–29)
CO2 SERPL-SCNC: 26 MMOL/L — SIGNIFICANT CHANGE UP (ref 22–31)
CO2 SERPL-SCNC: 26 MMOL/L — SIGNIFICANT CHANGE UP (ref 22–31)
CREAT SERPL-MCNC: 1.34 MG/DL — HIGH (ref 0.5–1.3)
CREAT SERPL-MCNC: 1.45 MG/DL — HIGH (ref 0.5–1.3)
CULTURE RESULTS: NO GROWTH — SIGNIFICANT CHANGE UP
DNA PLOIDY SPEC FC-IMP: SIGNIFICANT CHANGE UP
GAS PNL BLDA: SIGNIFICANT CHANGE UP
GAS PNL BLDMV: SIGNIFICANT CHANGE UP
GLUCOSE BLDC GLUCOMTR-MCNC: 100 MG/DL — HIGH (ref 70–99)
GLUCOSE BLDC GLUCOMTR-MCNC: 100 MG/DL — HIGH (ref 70–99)
GLUCOSE BLDC GLUCOMTR-MCNC: 104 MG/DL — HIGH (ref 70–99)
GLUCOSE BLDC GLUCOMTR-MCNC: 107 MG/DL — HIGH (ref 70–99)
GLUCOSE BLDC GLUCOMTR-MCNC: 114 MG/DL — HIGH (ref 70–99)
GLUCOSE BLDC GLUCOMTR-MCNC: 84 MG/DL — SIGNIFICANT CHANGE UP (ref 70–99)
GLUCOSE BLDC GLUCOMTR-MCNC: 94 MG/DL — SIGNIFICANT CHANGE UP (ref 70–99)
GLUCOSE BLDC GLUCOMTR-MCNC: 97 MG/DL — SIGNIFICANT CHANGE UP (ref 70–99)
GLUCOSE SERPL-MCNC: 107 MG/DL — HIGH (ref 70–99)
GLUCOSE SERPL-MCNC: 86 MG/DL — SIGNIFICANT CHANGE UP (ref 70–99)
HCO3 BLDMV-SCNC: 23 MMOL/L — SIGNIFICANT CHANGE UP (ref 20–28)
HCO3 BLDMV-SCNC: 24 MMOL/L — SIGNIFICANT CHANGE UP (ref 20–28)
HCO3 BLDMV-SCNC: 24 MMOL/L — SIGNIFICANT CHANGE UP (ref 20–28)
HCO3 BLDMV-SCNC: 26 MMOL/L — SIGNIFICANT CHANGE UP (ref 20–28)
HCO3 BLDMV-SCNC: 26 MMOL/L — SIGNIFICANT CHANGE UP (ref 20–28)
HCO3 BLDMV-SCNC: 27 MMOL/L — SIGNIFICANT CHANGE UP (ref 20–28)
HCT VFR BLD CALC: 25.1 % — LOW (ref 39–50)
HCT VFR BLD CALC: 28.2 % — LOW (ref 39–50)
HGB BLD-MCNC: 8.2 G/DL — LOW (ref 13–17)
HGB BLD-MCNC: 9.8 G/DL — LOW (ref 13–17)
HOROWITZ INDEX BLDMV+IHG-RTO: 50 — SIGNIFICANT CHANGE UP
INR BLD: 1.29 RATIO — HIGH (ref 0.88–1.16)
MCHC RBC-ENTMCNC: 31 PG — SIGNIFICANT CHANGE UP (ref 27–34)
MCHC RBC-ENTMCNC: 32.4 PG — SIGNIFICANT CHANGE UP (ref 27–34)
MCHC RBC-ENTMCNC: 32.8 GM/DL — SIGNIFICANT CHANGE UP (ref 32–36)
MCHC RBC-ENTMCNC: 34.6 GM/DL — SIGNIFICANT CHANGE UP (ref 32–36)
MCV RBC AUTO: 93.8 FL — SIGNIFICANT CHANGE UP (ref 80–100)
MCV RBC AUTO: 94.5 FL — SIGNIFICANT CHANGE UP (ref 80–100)
MTHFR GENE INTERPRETATION: SIGNIFICANT CHANGE UP
O2 CT VFR BLD CALC: 32 MMHG — SIGNIFICANT CHANGE UP (ref 30–65)
O2 CT VFR BLD CALC: 36 MMHG — SIGNIFICANT CHANGE UP (ref 30–65)
O2 CT VFR BLD CALC: 37 MMHG — SIGNIFICANT CHANGE UP (ref 30–65)
O2 CT VFR BLD CALC: 37 MMHG — SIGNIFICANT CHANGE UP (ref 30–65)
O2 CT VFR BLD CALC: 40 MMHG — SIGNIFICANT CHANGE UP (ref 30–65)
O2 CT VFR BLD CALC: 40 MMHG — SIGNIFICANT CHANGE UP (ref 30–65)
PCO2 BLDMV: 37 MMHG — SIGNIFICANT CHANGE UP (ref 30–65)
PCO2 BLDMV: 39 MMHG — SIGNIFICANT CHANGE UP (ref 30–65)
PCO2 BLDMV: 40 MMHG — SIGNIFICANT CHANGE UP (ref 30–65)
PCO2 BLDMV: 41 MMHG — SIGNIFICANT CHANGE UP (ref 30–65)
PCO2 BLDMV: 42 MMHG — SIGNIFICANT CHANGE UP (ref 30–65)
PCO2 BLDMV: 42 MMHG — SIGNIFICANT CHANGE UP (ref 30–65)
PH BLDMV: 7.38 — SIGNIFICANT CHANGE UP (ref 7.32–7.45)
PH BLDMV: 7.41 — SIGNIFICANT CHANGE UP (ref 7.32–7.45)
PH BLDMV: 7.41 — SIGNIFICANT CHANGE UP (ref 7.32–7.45)
PH BLDMV: 7.42 — SIGNIFICANT CHANGE UP (ref 7.32–7.45)
PLATELET # BLD AUTO: 148 K/UL — LOW (ref 150–400)
PLATELET # BLD AUTO: 168 K/UL — SIGNIFICANT CHANGE UP (ref 150–400)
POTASSIUM SERPL-MCNC: 3.6 MMOL/L — SIGNIFICANT CHANGE UP (ref 3.5–5.3)
POTASSIUM SERPL-MCNC: 4 MMOL/L — SIGNIFICANT CHANGE UP (ref 3.5–5.3)
POTASSIUM SERPL-SCNC: 3.6 MMOL/L — SIGNIFICANT CHANGE UP (ref 3.5–5.3)
POTASSIUM SERPL-SCNC: 4 MMOL/L — SIGNIFICANT CHANGE UP (ref 3.5–5.3)
PROT SERPL-MCNC: 5.8 G/DL — LOW (ref 6–8.3)
PROT SERPL-MCNC: 6 G/DL — SIGNIFICANT CHANGE UP (ref 6–8.3)
PROTHROM AB SERPL-ACNC: 14 SEC — HIGH (ref 9.8–12.7)
PTR INTERPRETATION: SIGNIFICANT CHANGE UP
RBC # BLD: 2.65 M/UL — LOW (ref 4.2–5.8)
RBC # BLD: 3.01 M/UL — LOW (ref 4.2–5.8)
RBC # FLD: 15.6 % — HIGH (ref 10.3–14.5)
RBC # FLD: 15.6 % — HIGH (ref 10.3–14.5)
SAO2 % BLDMV: 55 % — LOW (ref 60–90)
SAO2 % BLDMV: 59 % — LOW (ref 60–90)
SAO2 % BLDMV: 63 % — SIGNIFICANT CHANGE UP (ref 60–90)
SAO2 % BLDMV: 64 % — SIGNIFICANT CHANGE UP (ref 60–90)
SAO2 % BLDMV: 70 % — SIGNIFICANT CHANGE UP (ref 60–90)
SAO2 % BLDMV: 71 % — SIGNIFICANT CHANGE UP (ref 60–90)
SODIUM SERPL-SCNC: 139 MMOL/L — SIGNIFICANT CHANGE UP (ref 135–145)
SODIUM SERPL-SCNC: 140 MMOL/L — SIGNIFICANT CHANGE UP (ref 135–145)
SPECIMEN SOURCE: SIGNIFICANT CHANGE UP
TACROLIMUS SERPL-MCNC: 4 NG/ML — SIGNIFICANT CHANGE UP
VANCOMYCIN TROUGH SERPL-MCNC: 15.1 UG/ML — SIGNIFICANT CHANGE UP (ref 10–20)
WBC # BLD: 18.1 K/UL — HIGH (ref 3.8–10.5)
WBC # BLD: 21.7 K/UL — HIGH (ref 3.8–10.5)
WBC # FLD AUTO: 18.1 K/UL — HIGH (ref 3.8–10.5)
WBC # FLD AUTO: 21.7 K/UL — HIGH (ref 3.8–10.5)

## 2018-02-28 PROCEDURE — 99291 CRITICAL CARE FIRST HOUR: CPT

## 2018-02-28 PROCEDURE — 99233 SBSQ HOSP IP/OBS HIGH 50: CPT

## 2018-02-28 PROCEDURE — 99292 CRITICAL CARE ADDL 30 MIN: CPT

## 2018-02-28 PROCEDURE — 93321 DOPPLER ECHO F-UP/LMTD STD: CPT | Mod: 26

## 2018-02-28 PROCEDURE — 71045 X-RAY EXAM CHEST 1 VIEW: CPT | Mod: 26

## 2018-02-28 PROCEDURE — 99232 SBSQ HOSP IP/OBS MODERATE 35: CPT

## 2018-02-28 PROCEDURE — 93308 TTE F-UP OR LMTD: CPT | Mod: 26

## 2018-02-28 PROCEDURE — 93970 EXTREMITY STUDY: CPT | Mod: 26

## 2018-02-28 RX ORDER — INSULIN LISPRO 100/ML
VIAL (ML) SUBCUTANEOUS AT BEDTIME
Qty: 0 | Refills: 0 | Status: DISCONTINUED | OUTPATIENT
Start: 2018-02-28 | End: 2018-04-10

## 2018-02-28 RX ORDER — ALBUMIN HUMAN 25 %
50 VIAL (ML) INTRAVENOUS ONCE
Qty: 0 | Refills: 0 | Status: COMPLETED | OUTPATIENT
Start: 2018-02-28 | End: 2018-02-28

## 2018-02-28 RX ORDER — POTASSIUM CHLORIDE 20 MEQ
10 PACKET (EA) ORAL ONCE
Qty: 0 | Refills: 0 | Status: COMPLETED | OUTPATIENT
Start: 2018-02-28 | End: 2018-02-28

## 2018-02-28 RX ORDER — TACROLIMUS 5 MG/1
5 CAPSULE ORAL
Qty: 0 | Refills: 0 | Status: DISCONTINUED | OUTPATIENT
Start: 2018-02-28 | End: 2018-03-01

## 2018-02-28 RX ORDER — PANTOPRAZOLE SODIUM 20 MG/1
40 TABLET, DELAYED RELEASE ORAL
Qty: 0 | Refills: 0 | Status: DISCONTINUED | OUTPATIENT
Start: 2018-02-28 | End: 2018-03-01

## 2018-02-28 RX ORDER — FUROSEMIDE 40 MG
10 TABLET ORAL ONCE
Qty: 0 | Refills: 0 | Status: COMPLETED | OUTPATIENT
Start: 2018-02-28 | End: 2018-02-28

## 2018-02-28 RX ORDER — FUROSEMIDE 40 MG
20 TABLET ORAL ONCE
Qty: 0 | Refills: 0 | Status: COMPLETED | OUTPATIENT
Start: 2018-02-28 | End: 2018-02-28

## 2018-02-28 RX ORDER — NYSTATIN 500MM UNIT
500000 POWDER (EA) MISCELLANEOUS EVERY 6 HOURS
Qty: 0 | Refills: 0 | Status: DISCONTINUED | OUTPATIENT
Start: 2018-02-28 | End: 2018-04-10

## 2018-02-28 RX ORDER — INSULIN LISPRO 100/ML
VIAL (ML) SUBCUTANEOUS
Qty: 0 | Refills: 0 | Status: DISCONTINUED | OUTPATIENT
Start: 2018-02-28 | End: 2018-04-10

## 2018-02-28 RX ADMIN — INSULIN HUMAN 30 UNIT(S)/HR: 100 INJECTION, SOLUTION SUBCUTANEOUS at 01:12

## 2018-02-28 RX ADMIN — PANTOPRAZOLE SODIUM 40 MILLIGRAM(S): 20 TABLET, DELAYED RELEASE ORAL at 08:45

## 2018-02-28 RX ADMIN — Medication 300 MILLILITER(S): at 09:34

## 2018-02-28 RX ADMIN — Medication 50 MILLIEQUIVALENT(S): at 05:10

## 2018-02-28 RX ADMIN — Medication 50 MILLIEQUIVALENT(S): at 01:30

## 2018-02-28 RX ADMIN — Medication 10 MILLIGRAM(S): at 10:06

## 2018-02-28 RX ADMIN — MYCOPHENOLATE MOFETIL 1000 MILLIGRAM(S): 250 CAPSULE ORAL at 08:45

## 2018-02-28 RX ADMIN — Medication 11.65 MICROGRAM(S)/KG/MIN: at 00:21

## 2018-02-28 RX ADMIN — Medication 500000 UNIT(S): at 11:58

## 2018-02-28 RX ADMIN — Medication 500000 UNIT(S): at 17:31

## 2018-02-28 RX ADMIN — Medication 30 MILLIGRAM(S): at 20:19

## 2018-02-28 RX ADMIN — MILRINONE LACTATE 2.33 MICROGRAM(S)/KG/MIN: 1 INJECTION, SOLUTION INTRAVENOUS at 00:21

## 2018-02-28 RX ADMIN — Medication 30 MILLIGRAM(S): at 08:45

## 2018-02-28 RX ADMIN — TACROLIMUS 5 MILLIGRAM(S): 5 CAPSULE ORAL at 20:18

## 2018-02-28 RX ADMIN — TACROLIMUS 4 MILLIGRAM(S): 5 CAPSULE ORAL at 09:57

## 2018-02-28 RX ADMIN — Medication 20 MILLIGRAM(S): at 12:40

## 2018-02-28 RX ADMIN — Medication 100 MILLIGRAM(S): at 06:38

## 2018-02-28 RX ADMIN — MYCOPHENOLATE MOFETIL 1000 MILLIGRAM(S): 250 CAPSULE ORAL at 20:19

## 2018-02-28 RX ADMIN — MEROPENEM 100 MILLIGRAM(S): 1 INJECTION INTRAVENOUS at 05:32

## 2018-02-28 RX ADMIN — VASOPRESSIN 6 UNIT(S)/MIN: 20 INJECTION INTRAVENOUS at 00:21

## 2018-02-28 NOTE — PROGRESS NOTE ADULT - SUBJECTIVE AND OBJECTIVE BOX
INFECTIOUS DISEASES FOLLOW UP-- Sujey Lujan  [phone]    This is a follow up note for this  67yMale with s/p heart transplant on 2/23 doing well, extubated, on hi-flow oxygen      ROS:  CONSTITUTIONAL:  No fever, improving appetite  CARDIOVASCULAR:  No chest pain or palpitations  RESPIRATORY:  No dyspnea  GASTROINTESTINAL:  No nausea, vomiting, diarrhea, or abdominal pain  GENITOURINARY:  No dysuria  NEUROLOGIC:  No headache,     Allergies    No Known Allergies    Intolerances        ANTIBIOTICS/RELEVANT:  antimicrobials  nystatin    Suspension 754151 Unit(s) Oral every 6 hours    immunologic:  mycophenolate mofetil 1000 milliGRAM(s) Oral <User Schedule>  tacrolimus 5 milliGRAM(s) Oral <User Schedule>    OTHER:  argatroban Infusion 2 MICROgram(s)/kG/Min IV Continuous <Continuous>  DOBUTamine Infusion 4 MICROgram(s)/kG/Min IV Continuous <Continuous>  docusate sodium 100 milliGRAM(s) Oral three times a day  insulin lispro (HumaLOG) corrective regimen sliding scale   SubCutaneous three times a day before meals  insulin lispro (HumaLOG) corrective regimen sliding scale   SubCutaneous at bedtime  pantoprazole    Tablet 40 milliGRAM(s) Oral before breakfast  predniSONE   Tablet 30 milliGRAM(s) Oral <User Schedule>  sodium chloride 0.9%. 1000 milliLiter(s) IV Continuous <Continuous>  vasopressin Infusion 0.1 Unit(s)/Min IV Continuous <Continuous>      Objective:  Vital Signs Last 24 Hrs  T(C): 36.1 (28 Feb 2018 12:00), Max: 36.7 (27 Feb 2018 19:00)  T(F): 97 (28 Feb 2018 12:00), Max: 98.1 (27 Feb 2018 19:00)  HR: 93 (28 Feb 2018 16:45) (91 - 109)  BP: 132/75 (28 Feb 2018 16:00) (132/75 - 132/75)  BP(mean): 96 (28 Feb 2018 16:00) (96 - 96)  RR: 19 (28 Feb 2018 16:45) (0 - 62)  SpO2: 100% (28 Feb 2018 16:45) (81% - 100%)    PHYSICAL EXAM:  Constitutional:no acute distress  Eyes:WALT, EOMI  Ear/Nose/Throat: no oral lesions, 	  Respiratory: clear anteriorly but very dimished BS at right base 1/2 way up  Cardiovascular: S1S2 tachy  Gastrointestinal:soft, (+) BS, no tenderness, soft  Extremities:no e/e/c, arthritic changes in hands  No Lymphadenopathy  IV sites not inflammed.    LABS:                        9.8    18.1  )-----------( 168      ( 28 Feb 2018 14:36 )             28.2     02-28    140  |  102  |  49<H>  ----------------------------<  86  4.0   |  26  |  1.34<H>    Ca    7.9<L>      28 Feb 2018 14:36  Phos  3.8     02-27    TPro  6.0  /  Alb  3.6  /  TBili  0.6  /  DBili  x   /  AST  25  /  ALT  15  /  AlkPhos  59  02-28    PT/INR - ( 28 Feb 2018 00:49 )   PT: 14.0 sec;   INR: 1.29 ratio         PTT - ( 28 Feb 2018 12:19 )  PTT:65.1 sec      MICROBIOLOGY:    Hepatitis C Virus Genotype (02.23.18 @ 15:18)    Hepatitis C Virus Genotype: 1a: Hepatitis C virus (HCV) genotypes that can be detected include:  1, 1a, 1b, 1a or 1 b, 2, 2a, 2b, 2a or 2c, 3, 3a, 3b, 3c, 3k, 4,  4a/4c/4d, 4b, 4e, 4f,  4h, 5a, 6a/6b.  This assay is performed using analyte specific reagents, reverse  transcription polymerase chain reaction(RT-PCR) and the INNO-LiPA HCV  Genotype 2.0 DNA line probe assay ([employer], [place], [place]). The HCV  genotype is determined based on the DNA sequence of the 5'UTR and the  core region of  HCV genome. This test was developed and the performance characteristics  for detection for HCV genotypes 1, 2, 3, 4, and subtypes, were determined  by the [facility]. Due to a limited  availability of clinical samples, the performance characteristics of the  assay for the identification of HCV genotypes 5 and 6 have not yet been  established. This test is not cleared or  approved by US Food and Drug Administration (FDA).            RECENT CULTURES:  02-23 @ 16:56  Pericardial Pericardial Fluid  --  --  --    No growth to date.  --      RADIOLOGY & ADDITIONAL STUDIES:    < from: Xray Chest 1 View- PORTABLE-Routine (02.28.18 @ 02:50) >  NDINGS:     Interval extubation and removal of enteric tube. [place]-Cassidy catheter is   unchanged terminating in the right pulmonary artery.  There has been decreased expansion of the right lung compared to the   chest film one day earlier.    There remains discoidatelectasis at the left base.    IMPRESSION: There has been decreased expansion to the right lung   following the removal of the endotracheal tube.    I spoke to OPAL Rosado concerning the atelectasis.    < end of copied text >

## 2018-02-28 NOTE — PROGRESS NOTE ADULT - ASSESSMENT
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 Patient on broad federica-operative prophylaxis to Vancomycin/Meropenem/Fluconazole based upon his prior LVAD placement and prolonged hospitalization pre-transplant. Intermediate risk for CMV (seropositive); intermediate risk for toxoplasmosis (seropositive R). Will need to start PPX when able to tolerate PO, stabilization of Cr. Suspect leukocytosis is reactive, not indicative of infection; however patient high risk, prolonged hospitalization, LVAD now S/p cardiac transplant, CMV/toxo serology positive.    -  - Bactrim (PCP PPX)/Valcyte (CMV PPX) to be added when Cr improved, able to tolerate PO- in the next few days  - Will need to follow up on donor serologies for CMV, HCV viral load and donor HCV genotype is 1a  - Please send HCV viral load for the recipient (Mr. Baez)   - atelectasis at right lung- incentive spirometry to re-expand, repeat CXR 3/1/18    - Cultures are NGTD, WBC improved will discontinue federica-operative antibiotics and observe    Gary Lujan MD  [phone]  After 5pm/weekends [phone]

## 2018-02-28 NOTE — PROGRESS NOTE ADULT - ASSESSMENT
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     1. Bilateral DVT: Acute, occlusive DVT of the right internal jugular, innominate, and subclavian veins and acute, occlusive DVT of the left subclavian vein along with superficial thrombosis of the right cephalic vein.    DVT appears acute and there is a high risk of PE due to the extension of the DVT into the subclavian vein.   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    Head elevation and bilateral arm elevation will help the edema decrease.     Will follow

## 2018-02-28 NOTE — PROGRESS NOTE ADULT - SUBJECTIVE AND OBJECTIVE BOX
HPI:  67M PMH Chronic Systolic Heart Failure (ACC/AHA Stage D) due to NICMP s/p LVAD 17, GIB s/p Cautery (2017), known AV Malformations, Chronic Anemia due to numerous GIBs (off AC), A-Fib, VT s/p AICD. Pt admitted due to elevated LDH level of 646 for further evaluation and observation.  . Pt denies any chest pain, SOB, palpitations, N/D/V, abdominal pain, headaches, changes in vision, dizziness, pain or burning while urinating, swelling, numbness/tingling anywhere, rashes, fever, or recent travel. (2018 19:36)    Review Of Systems:  [x ] 10 point review of systems is otherwise negative except as mentioned above               Medications:  albumin human 25% IVPB 50 milliLiter(s) IV Intermittent once  argatroban Infusion 2 MICROgram(s)/kG/Min IV Continuous <Continuous>  DOBUTamine Infusion 5 MICROgram(s)/kG/Min IV Continuous <Continuous>  docusate sodium 100 milliGRAM(s) Oral three times a day  insulin lispro (HumaLOG) corrective regimen sliding scale   SubCutaneous three times a day before meals  insulin lispro (HumaLOG) corrective regimen sliding scale   SubCutaneous at bedtime  mycophenolate mofetil 1000 milliGRAM(s) Oral <User Schedule>  nystatin    Suspension 593461 Unit(s) Oral every 6 hours  pantoprazole    Tablet 40 milliGRAM(s) Oral before breakfast  predniSONE   Tablet 30 milliGRAM(s) Oral <User Schedule>  sodium chloride 0.9%. 1000 milliLiter(s) IV Continuous <Continuous>  tacrolimus 5 milliGRAM(s) Oral <User Schedule>  vasopressin Infusion 0.1 Unit(s)/Min IV Continuous <Continuous>    PMH/PSH/FH/SH: [ ] Unchanged  Vitals:  T(C): 36.1 (18 @ 12:00), Max: 36.7 (18 @ 19:00)  HR: 95 (18 @ 13:00) (92 - 109)  BP: --  BP(mean): --  RR: 21 (18 @ 13:00) (0 - 62)  SpO2: 100% (18 @ 13:00) (81% - 100%)  Wt(kg): --  Daily     Daily Weight in k.8 (2018 00:00)  I&O's Summary    2018 07:  -  2018 07:00  --------------------------------------------------------  IN: 2240 mL / OUT: 1775 mL / NET: 465 mL    2018 07:  -  2018 14:19  --------------------------------------------------------  IN: 773 mL / OUT: 277 mL / NET: 496 mL    Physical Exam:  Appearance: On Nasal cannula /Nitric oxide, able to talk with mild SOB  Eyes: [ x] PERRL [x ] EOMI  HEENT: [x ] Normal oral [place] [x ]NC/AT  Cardiovascular: [x ] S1 [x ] S2, tachycardic,  [ x] No m/r/g  bilateral Upper extremity edema, + JVD, No LE edema  sternotomy site covered with clean dressings  Right femoral vein [place]-chi in place.   Respiratory: Decreased BS b/l bases  Gastrointestinal: [x ] Soft [x ] Non-tender [x ] Non-distended [x ] BS+  Musculoskeletal: [x ] No clubbing [x ] No joint deformity   Neurologic: [x ] Non-focal  Lymphatic: [x ] No lymphadenopathy  Psychiatry: [x ] AAOx3 [x ] Mood & affect appropriate  Skin: [x ] No rashes [x ] No ecchymoses [x ] No cyanosis        139  |  101  |  45<H>  ----------------------------<  107<H>  3.6   |  26  |  1.45<H>    Ca    8.0<L>      2018 00:49  Phos  3.8         TPro  5.8<L>  /  Alb  3.4  /  TBili  0.6  /  DBili  x   /  AST  27  /  ALT  13  /  AlkPhos  54      PT/INR - ( 2018 00:49 )   PT: 14.0 sec;   INR: 1.29 ratio    PTT - ( 2018 12:19 )  PTT:65.1 sec  Serum Pro-Brain Natriuretic Peptide: 3895 pg/mL ( @ 02:49)  Serum Pro-Brain Natriuretic Peptide: 9103 pg/mL ( @ 02:14)    Interpretation of Telemetry:  Sinus rhythm 90-110bpmm, NVST 4 beatsx1 and 5beats X1 HPI:  The patient reports feeling ok. Still on dobutamine. Started on argatroban. On inhaled Nitric oxide.      Review Of Systems:  [x ] 10 point review of systems is otherwise negative except as mentioned above               Medications:  albumin human 25% IVPB 50 milliLiter(s) IV Intermittent once  argatroban Infusion 2 MICROgram(s)/kG/Min IV Continuous <Continuous>  DOBUTamine Infusion 5 MICROgram(s)/kG/Min IV Continuous <Continuous>  docusate sodium 100 milliGRAM(s) Oral three times a day  insulin lispro (HumaLOG) corrective regimen sliding scale   SubCutaneous three times a day before meals  insulin lispro (HumaLOG) corrective regimen sliding scale   SubCutaneous at bedtime  mycophenolate mofetil 1000 milliGRAM(s) Oral <User Schedule>  nystatin    Suspension 424014 Unit(s) Oral every 6 hours  pantoprazole    Tablet 40 milliGRAM(s) Oral before breakfast  predniSONE   Tablet 30 milliGRAM(s) Oral <User Schedule>  sodium chloride 0.9%. 1000 milliLiter(s) IV Continuous <Continuous>  tacrolimus 5 milliGRAM(s) Oral <User Schedule>  vasopressin Infusion 0.1 Unit(s)/Min IV Continuous <Continuous>    PMH/PSH/FH/SH: [ ] Unchanged  Vitals:  T(C): 36.1 (18 @ 12:00), Max: 36.7 (18 @ 19:00)  HR: 95 (18 @ 13:00) (92 - 109)  BP: --  BP(mean): --  RR: 21 (18 @ 13:00) (0 - 62)  SpO2: 100% (18 @ 13:00) (81% - 100%)  Wt(kg): --  Daily     Daily Weight in k.8 (2018 00:00)  I&O's Summary    2018 07:  -  2018 07:00  --------------------------------------------------------  IN: 2240 mL / OUT: 1775 mL / NET: 465 mL    2018 07:01  -  2018 14:19  --------------------------------------------------------  IN: 773 mL / OUT: 277 mL / NET: 496 mL    Physical Exam:  Appearance: On Nasal cannula /Nitric oxide, able to talk with mild SOB  Eyes: [ x] PERRL [x ] EOMI  HEENT: [x ] Normal oral [place]a [x ]NC/AT  Cardiovascular: [x ] S1 [x ] S2, tachycardic,  [ x] No m/r/g  bilateral Upper extremity edema, + JVD, No LE edema  sternotomy site covered with clean dressings  Right femoral vein [place]-chi in place.   Respiratory: Decreased BS b/l bases  Gastrointestinal: [x ] Soft [x ] Non-tender [x ] Non-distended [x ] BS+  Musculoskeletal: [x ] No clubbing [x ] No joint deformity   Neurologic: [x ] Non-focal  Lymphatic: [x ] No lymphadenopathy  Psychiatry: [x ] AAOx3 [x ] Mood & affect appropriate  Skin: [x ] No rashes [x ] No ecchymoses [x ] No cyanosis        139  |  101  |  45<H>  ----------------------------<  107<H>  3.6   |  26  |  1.45<H>    Ca    8.0<L>      2018 00:49  Phos  3.8         TPro  5.8<L>  /  Alb  3.4  /  TBili  0.6  /  DBili  x   /  AST  27  /  ALT  13  /  AlkPhos  54      PT/INR - ( 2018 00:49 )   PT: 14.0 sec;   INR: 1.29 ratio    PTT - ( 2018 12:19 )  PTT:65.1 sec  Serum Pro-Brain Natriuretic Peptide: 3895 pg/mL ( @ 02:49)  Serum Pro-Brain Natriuretic Peptide: 9103 pg/mL ( @ 02:14)    Interpretation of Telemetry:  Sinus rhythm 90-110bpmm, NVST 4 beatsx1 and 5beats X1

## 2018-02-28 NOTE — PROGRESS NOTE ADULT - SUBJECTIVE AND OBJECTIVE BOX
BILLY RUSSELL   MRN#: [id number]     The patient is a 67y Male with PMH of end stage heart failure s/p Heart Mate II LVAD June 2017 complicated by pump thrombosis and recurrent GI bleed, s/p orthotopic heart transplant 2/23 complicated by clotting of his right IJ, innominate and bilateral subclavian veins as well as possible hyperacute graft dysfunction 2/23 who was seen, evaluated, & examined with the CTICU staff on rounds and later in the day with a multidisciplinary care plan formulated & implemented.  All available clinical, laboratory, radiographic, pharmacologic, and electrocardiographic data were reviewed & analyzed.      The patient was in the CTICU in critical condition secondary to resolving acute hypoxic respiratory failure-persistent cardiopulmonary dysfunction, resolved hyperlactatemia-acidosis, stress hyperglycemia, and cardiogenic shock-cardiovascular dysfunction-S/P heart transplant with graft dysfunction.     Respiratory status required supplemental oxygen,, the following of ABG’s with A-line monitoring, continuous pulse oximetry monitoring, inhaled Nitric oxide with the addition of high flow oxygen for support & to evaluate for & prevent further decompensation secondary to persistent cardiopulmonary dysfunction and cardiogenic shock-cardiovascular dysfunction-S/P heart transplant with graft dysfunction.      Invasive hemodynamic monitoring with a PA catheter and an A-line were required for the following of serial MVO2/CI and continuous MAP/BP monitoring to ensure adequate cardiovascular support and to evaluate for & help prevent decompensation while weaning an IV Levophed drip, weaning an IV Epinephrine drip, inhaled Nitric oxide, IV Primacor drip, IV Dobutamine & intermittent IV Lasix secondary to resolving hemodynamically significant anemia/hypovolemia-shock, resolved hyperlactatemia-acidosis,  & cardiogenic shock-cardiovascular dysfunction-S/P heart transplant with graft dysfunction.      Patient had positive HIT antibody test and IV argatroban started with titration while monitoring PTT. Serotonin releasing assay sent.    Metabolic stability, stress hyperglycemia, & infection prophylaxis required an IV regular Insulin drip & the following of serial glucose levels to help achieve & maintain euglycemia.      Patient required more than the usual postoperative critical care management and I provided 65 minutes of non-continuous care to the patient.  Discussed at length with the CTICU staff and helped coordinate care.

## 2018-02-28 NOTE — PROGRESS NOTE ADULT - SUBJECTIVE AND OBJECTIVE BOX
Interval History:      Medications:  albumin human 25% IVPB 50 milliLiter(s) IV Intermittent once  argatroban Infusion 2 MICROgram(s)/kG/Min IV Continuous <Continuous>  DOBUTamine Infusion 5 MICROgram(s)/kG/Min IV Continuous <Continuous>  docusate sodium 100 milliGRAM(s) Oral three times a day  furosemide   Injectable 20 milliGRAM(s) IV Push once  insulin lispro (HumaLOG) corrective regimen sliding scale   SubCutaneous three times a day before meals  insulin lispro (HumaLOG) corrective regimen sliding scale   SubCutaneous at bedtime  mycophenolate mofetil 1000 milliGRAM(s) Oral <User Schedule>  nystatin    Suspension 298582 Unit(s) Oral every 6 hours  pantoprazole    Tablet 40 milliGRAM(s) Oral before breakfast  predniSONE   Tablet 30 milliGRAM(s) Oral <User Schedule>  sodium chloride 0.9%. 1000 milliLiter(s) IV Continuous <Continuous>  vasopressin Infusion 0.1 Unit(s)/Min IV Continuous <Continuous>    Vitals:  T(C): 36.1 (18 @ 12:00), Max: 36.7 (18 @ 19:00)  HR: 95 (18 @ :00) (92 - 109)  BP: --  BP(mean): --  ABP: 135/64 (18 @ 12:00) (93/47 - 139/63)  ABP(mean): 82 (18 @ 12:00) (61 - 106)  RR: 22 (18 @ :00) (0 - 62)  SpO2: 100% (18 @ 12:00) (81% - 100%)  Wt(kg): --  CVP(cm H2O): --  CO: 9.6 (18 @ 12:00) (5.1 - 10.8)  CI: 5 (18 @ :00) (2.6 - 5.6)  PA: 38/12 (18 @ 12:00) (26/8 - 55/26)  PA(mean): 20 (18 @ 12:00) (14 - 37)  PCWP: --  SVR: 615 (18 @ 12:00) (517 - 1006)  PVR: --    Daily     Daily Weight in k.8 (2018 00:00)        I&O's Summary    2018 07:01  -  2018 07:00  --------------------------------------------------------  IN: 2240 mL / OUT: 1775 mL / NET: 465 mL    2018 07:01  -  2018 12:13  --------------------------------------------------------  IN: 711 mL / OUT: 175 mL / NET: 536 mL        Physical Exam:  Appearance: No Acute Distress  HEENT: No JVD  Cardiovascular: Normal S1 S2, No murmurs/rubs/gallops  Respiratory: Clear to auscultation bilaterally  Gastrointestinal: Soft, Non-tender	  Skin: No cyanosis	  Neurologic: Non-focal  Extremities: No LE edema  Psychiatry: A & O x 3, Mood & affect appropriate    Labs:                        8.2    21.7  )-----------( 148      ( 2018 00:49 )             25.1         139  |  101  |  45<H>  ----------------------------<  107<H>  3.6   |  26  |  1.45<H>    Ca    8.0<L>      2018 00:49  Phos  3.8         TPro  5.8<L>  /  Alb  3.4  /  TBili  0.6  /  DBili  x   /  AST  27  /  ALT  13  /  AlkPhos  54      PT/INR - ( 2018 00:49 )   PT: 14.0 sec;   INR: 1.29 ratio         PTT - ( 2018 06:40 )  PTT:58.2 sec      Serum Pro-Brain Natriuretic Peptide: 3895 pg/mL ( @ 02:49)  Serum Pro-Brain Natriuretic Peptide: 9103 pg/mL ( @ 02:14)    Oxygen Saturation, Mixed: 63 % ( @ 09:28)  Oxygen Saturation, Mixed: 64 % ( @ 06:35)  Oxygen Saturation, Mixed: 55 % ( @ 03:45)  Oxygen Saturation, Mixed: 59 % ( @ 01:10)  Oxygen Saturation, Mixed: 55 % ( @ 23:14)  Oxygen Saturation, Mixed: 60 % ( @ 21:06)  Oxygen Saturation, Mixed: 52 % ( @ 18:21)  Oxygen Saturation, Mixed: 63 % ( @ 16:20)  Oxygen Saturation, Mixed: 59 % ( @ 14:13)  Oxygen Saturation, Mixed: 51 % ( @ 12:18)  Oxygen Saturation, Mixed: 54 % ( @ 10:07)  Oxygen Saturation, Mixed: 54 % ( @ 09:15)  Oxygen Saturation, Mixed: 48 % ( @ 08:19)  Oxygen Saturation, Mixed: 64 % ( @ 06:21)  Oxygen Saturation, Mixed: 66 % ( @ 04:09)  Oxygen Saturation, Mixed: 64 % ( @ 02:34)  Oxygen Saturation, Mixed: 64 % ( @ 00:05)  Oxygen Saturation, Mixed: 66 % ( @ 22:07)  Oxygen Saturation, Mixed: 64 % ( @ 20:18)  Oxygen Saturation, Mixed: 66 % ( @ 18:40)  Oxygen Saturation, Mixed: 59 % ( @ 17:10)  Oxygen Saturation, Mixed: 50 % ( @ 13:29)  Oxygen Saturation, Mixed: 52 % ( @ 10:52)    Lactate Dehydrogenase, Serum: 500 U/L ( @ 02:14)  Lactate Dehydrogenase, Serum: 527 U/L ( @ 19:02)          TELEMETRY:    Echocardiogram: Interval History:  doing well  has some higher O2 requirements; currently on HFNC with A-a gradient on ABG  denies pain  having BMs  weaned off milrinone and epi    Medications:  albumin human 25% IVPB 50 milliLiter(s) IV Intermittent once  argatroban Infusion 2 MICROgram(s)/kG/Min IV Continuous <Continuous>  DOBUTamine Infusion 5 MICROgram(s)/kG/Min IV Continuous <Continuous>  docusate sodium 100 milliGRAM(s) Oral three times a day  furosemide   Injectable 20 milliGRAM(s) IV Push once  insulin lispro (HumaLOG) corrective regimen sliding scale   SubCutaneous three times a day before meals  insulin lispro (HumaLOG) corrective regimen sliding scale   SubCutaneous at bedtime  mycophenolate mofetil 1000 milliGRAM(s) Oral <User Schedule>  nystatin    Suspension 094620 Unit(s) Oral every 6 hours  pantoprazole    Tablet 40 milliGRAM(s) Oral before breakfast  predniSONE   Tablet 30 milliGRAM(s) Oral <User Schedule>  sodium chloride 0.9%. 1000 milliLiter(s) IV Continuous <Continuous>  vasopressin Infusion 0.1 Unit(s)/Min IV Continuous <Continuous>    Vitals:  T(C): 36.1 (18 @ 12:00), Max: 36.7 (18 @ 19:00)  HR: 95 (18 @ :00) (92 - 109)  ABP: 135/64 (18 @ 12:00) (93/47 - 139/63)  ABP(mean): 82 (18 @ 12:00) (61 - 106)  RR: 22 (18 @ 12:00) (0 - 62)  SpO2: 100% (18 @ 12:00) (81% - 100%)  CO: 9.6 (18 @ 12:00) (5.1 - 10.8)  CI: 5 (18 @ :00) (2.6 - 5.6)  PA: 38/12 (18 @ 12:00) (26/8 - 55/26)  PA(mean): 20 (18 @ 12:00) (14 - 37)  SVR: 615 (18 @ 12:00) (517 - 1006)    Daily     Daily Weight in k.8 (2018 00:00)    I&O's Summary    2018 07:01  -  2018 07:00  --------------------------------------------------------  IN: 2240 mL / OUT: 1775 mL / NET: 465 mL    2018 07:01  -  2018 12:13  --------------------------------------------------------  IN: 711 mL / OUT: 175 mL / NET: 536 mL    Physical Exam:  Appearance: No Acute Distress. Facial swelling  HEENT: No significant JVD  Cardiovascular: Normal S1 S2, No murmurs/rubs/gallops  Respiratory: Clear to auscultation bilaterally  Gastrointestinal: Bandages c/d/i  Skin: No cyanosis	  Neurologic: Non-focal  Extremities: No LE edema; Swelling in UE bilaterally  Psychiatry: A & O x 3, Mood & affect appropriate    Labs:                        8.2    21.7  )-----------( 148      ( 2018 00:49 )             25.1         139  |  101  |  45<H>  ----------------------------<  107<H>  3.6   |  26  |  1.45<H>    Ca    8.0<L>      2018 00:49  Phos  3.8         TPro  5.8<L>  /  Alb  3.4  /  TBili  0.6  /  DBili  x   /  AST  27  /  ALT  13  /  AlkPhos  54  28    PT/INR - ( 2018 00:49 )   PT: 14.0 sec;   INR: 1.29 ratio       PTT - ( 2018 06:40 )  PTT:58.2 sec    Serum Pro-Brain Natriuretic Peptide: 3895 pg/mL ( @ 02:49)  Serum Pro-Brain Natriuretic Peptide: 9103 pg/mL ( @ 02:14)    Oxygen Saturation, Mixed: 63 % ( @ 09:28)  Oxygen Saturation, Mixed: 64 % ( @ 06:35)  Oxygen Saturation, Mixed: 55 % ( @ 03:45)  Oxygen Saturation, Mixed: 59 % ( @ 01:10)  Oxygen Saturation, Mixed: 55 % ( @ 23:14)  Oxygen Saturation, Mixed: 60 % ( @ 21:06)  Oxygen Saturation, Mixed: 52 % ( @ 18:21)  Oxygen Saturation, Mixed: 63 % ( @ 16:20)  Oxygen Saturation, Mixed: 59 % ( @ 14:13)  Oxygen Saturation, Mixed: 51 % ( @ 12:18)  Oxygen Saturation, Mixed: 54 % ( @ 10:07)  Oxygen Saturation, Mixed: 54 % ( @ 09:15)  Oxygen Saturation, Mixed: 48 % ( @ 08:19)  Oxygen Saturation, Mixed: 64 % ( @ 06:21)  Oxygen Saturation, Mixed: 66 % ( @ 04:09)  Oxygen Saturation, Mixed: 64 % ( @ 02:34)  Oxygen Saturation, Mixed: 64 % ( @ 00:05)  Oxygen Saturation, Mixed: 66 % ( @ 22:07)  Oxygen Saturation, Mixed: 64 % ( @ 20:18)  Oxygen Saturation, Mixed: 66 % ( @ 18:40)  Oxygen Saturation, Mixed: 59 % ( @ 17:10)  Oxygen Saturation, Mixed: 50 % ( @ 13:29)  Oxygen Saturation, Mixed: 52 % ( @ 10:52)    Lactate Dehydrogenase, Serum: 500 U/L ( @ 02:14)  Lactate Dehydrogenase, Serum: 527 U/L ( @ 19:02)    TELEMETRY:  sinus tach

## 2018-02-28 NOTE — PROGRESS NOTE ADULT - ASSESSMENT
Mr. Baez is a 67 year old man with ACC/AHA stage D chronic systolic heart failure due to NICM (LVEF 20%) s/p HM2 LVAD  c/b pump thrombus now s/p OHT  (CMV D-/R+ and Toxo D-/R+), with post-op course c/b primary graft dysfuction with biventricular dysfunction, initially thought to be immune-mediated due to positive B-cell flow (negative CDC cross match) which was unlikely and received plasmapharesis/IVIg with improvement in LV function since to 55% with improving RV function; improving hemodynamically on current support. Was found to have b/l IJ/subclavian thrombi. Extubated .     Immunosuppression prophylaxis:  Tacrolimus - started on prograf ; received 4 mg last evening at 20:00; tac level 4; increase to 5 mg q12; would check level this evening (would reflect 4 mg x2); please check levels at 7:30 am (stat)  Continue CellCept 1000 mg IV BID. change dosing to 00 and   Steroids - continue taper; on prednisone 30 mg PO q12h; reduce to 25 mg q12 tomorrow  	  Antimicrobial prophylaxis:  Intermediate risk CMV - Eventually will start IV ganciclovir or oral valganciclovir when more stable. No urgency.  Intermediate risk Toxo - no prophylaxis needed  PCP - delay start of Bactrim until renal function is stable; will consider by Thursday if continues to have good renal function  Can discontinue antibiotics given improvement in leukocytosis and no positive cultures     Graft function:  LV function improved; persistent RV dysfunction however CI stable (thermo appears to be better than Marino indices)  continue  5 mcg/kg/min  maintain CVP 6-10; PCWP 8 but mPAP 25  would be slow to wean off Kamala until inotrope requirement lower  plan for biopsy Thursday; keep NPO after MN; hold argatroban 6 hours prior to biopsy and resume 24 hours after biopsy    Other prophylaxis:  Protonix 40 mg IV daily for GI prophylaxis while on steroids.    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 Mr. Baez is a 67 year old man with ACC/AHA stage D chronic systolic heart failure due to NICM (LVEF 20%) s/p HM2 LVAD  c/b pump thrombus now s/p OHT  (CMV D-/R+ and Toxo D-/R+), with post-op course c/b primary graft dysfuction with biventricular dysfunction, initially thought to be immune-mediated due to positive B-cell flow (negative CDC cross match) which was unlikely and received plasmapharesis/IVIg with improvement in LV function since to 55% with improving RV function; improving hemodynamically on current support. Was found to have b/l IJ/subclavian thrombi. Extubated .     Immunosuppression prophylaxis:  Tacrolimus - started on prograf ; received 4 mg last evening at 20:00; tac level 4; increase to 5 mg q12; would check level this evening (would reflect 4 mg x2); please check levels at 7:30 am (stat)  Continue CellCept 1000 mg IV BID. change dosing to 00 and   Steroids - continue taper; on prednisone 30 mg PO q12h; reduce to 25 mg q12 tomorrow  	  Antimicrobial prophylaxis:  Intermediate risk CMV - Eventually will start IV ganciclovir or oral valganciclovir when more stable. No urgency.  Intermediate risk Toxo - no prophylaxis needed  PCP - delay start of Bactrim until renal function is stable; will consider by Thursday if continues to have good renal function  Can discontinue antibiotics given improvement in leukocytosis and no positive cultures     Graft function:  LV function improved; persistent RV dysfunction however CI stable (thermo appears to be better than Marino indices)  continue  5 mcg/kg/min  maintain CVP 6-10; PCWP 8 but mPAP 25  would be slow to wean off Kamala until inotrope requirement lower  plan for biopsy Thursday; keep NPO after MN; hold argatroban 6 hours prior to biopsy and resume 24 hours after biopsy    Hypoxia:  likely d/t atelectasis; concern of PE raised however d/t RV dysfunction, extensive thrombi; repeat TTE; on AC     Other prophylaxis:  Protonix 40 mg IV daily for GI prophylaxis while on steroids.    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

## 2018-03-01 ENCOUNTER — RESULT REVIEW (OUTPATIENT)
Age: 68
End: 2018-03-01

## 2018-03-01 LAB
ALBUMIN SERPL ELPH-MCNC: 3.4 G/DL — SIGNIFICANT CHANGE UP (ref 3.3–5)
ALBUMIN SERPL ELPH-MCNC: 3.4 G/DL — SIGNIFICANT CHANGE UP (ref 3.3–5)
ALP SERPL-CCNC: 58 U/L — SIGNIFICANT CHANGE UP (ref 40–120)
ALP SERPL-CCNC: 58 U/L — SIGNIFICANT CHANGE UP (ref 40–120)
ALT FLD-CCNC: 13 U/L RC — SIGNIFICANT CHANGE UP (ref 10–45)
ALT FLD-CCNC: 14 U/L RC — SIGNIFICANT CHANGE UP (ref 10–45)
ANION GAP SERPL CALC-SCNC: 12 MMOL/L — SIGNIFICANT CHANGE UP (ref 5–17)
ANION GAP SERPL CALC-SCNC: 12 MMOL/L — SIGNIFICANT CHANGE UP (ref 5–17)
APTT BLD: 28.5 SEC — SIGNIFICANT CHANGE UP (ref 27.5–37.4)
APTT BLD: 57 SEC — HIGH (ref 27.5–37.4)
AST SERPL-CCNC: 24 U/L — SIGNIFICANT CHANGE UP (ref 10–40)
AST SERPL-CCNC: 24 U/L — SIGNIFICANT CHANGE UP (ref 10–40)
BASE EXCESS BLDMV CALC-SCNC: 0 MMOL/L — SIGNIFICANT CHANGE UP (ref -3–3)
BILIRUB SERPL-MCNC: 0.6 MG/DL — SIGNIFICANT CHANGE UP (ref 0.2–1.2)
BILIRUB SERPL-MCNC: 0.7 MG/DL — SIGNIFICANT CHANGE UP (ref 0.2–1.2)
BUN SERPL-MCNC: 40 MG/DL — HIGH (ref 7–23)
BUN SERPL-MCNC: 44 MG/DL — HIGH (ref 7–23)
CALCIUM SERPL-MCNC: 7.9 MG/DL — LOW (ref 8.4–10.5)
CALCIUM SERPL-MCNC: 7.9 MG/DL — LOW (ref 8.4–10.5)
CHLORIDE SERPL-SCNC: 104 MMOL/L — SIGNIFICANT CHANGE UP (ref 96–108)
CHLORIDE SERPL-SCNC: 108 MMOL/L — SIGNIFICANT CHANGE UP (ref 96–108)
CO2 BLDMV-SCNC: 25 MMOL/L — SIGNIFICANT CHANGE UP (ref 21–29)
CO2 SERPL-SCNC: 25 MMOL/L — SIGNIFICANT CHANGE UP (ref 22–31)
CO2 SERPL-SCNC: 25 MMOL/L — SIGNIFICANT CHANGE UP (ref 22–31)
CREAT SERPL-MCNC: 1.08 MG/DL — SIGNIFICANT CHANGE UP (ref 0.5–1.3)
CREAT SERPL-MCNC: 1.18 MG/DL — SIGNIFICANT CHANGE UP (ref 0.5–1.3)
GAS PNL BLDA: SIGNIFICANT CHANGE UP
GAS PNL BLDMV: SIGNIFICANT CHANGE UP
GLUCOSE BLDC GLUCOMTR-MCNC: 90 MG/DL — SIGNIFICANT CHANGE UP (ref 70–99)
GLUCOSE SERPL-MCNC: 89 MG/DL — SIGNIFICANT CHANGE UP (ref 70–99)
GLUCOSE SERPL-MCNC: 94 MG/DL — SIGNIFICANT CHANGE UP (ref 70–99)
HCO3 BLDMV-SCNC: 24 MMOL/L — SIGNIFICANT CHANGE UP (ref 20–28)
HCT VFR BLD CALC: 29 % — LOW (ref 39–50)
HCT VFR BLD CALC: 31.9 % — LOW (ref 39–50)
HCT VFR BLD CALC: 32.2 % — LOW (ref 39–50)
HCT VFR BLD CALC: 32.7 % — LOW (ref 39–50)
HCV RNA SERPL NAA DL=5-ACNC: 392 IU/ML — HIGH
HCV RNA SPEC NAA+PROBE-LOG IU: 2.59 LOGIU/ML — HIGH
HGB BLD-MCNC: 10 G/DL — LOW (ref 13–17)
HGB BLD-MCNC: 10.1 G/DL — LOW (ref 13–17)
HGB BLD-MCNC: 10.4 G/DL — LOW (ref 13–17)
HGB BLD-MCNC: 9.7 G/DL — LOW (ref 13–17)
HOROWITZ INDEX BLDMV+IHG-RTO: 50 — SIGNIFICANT CHANGE UP
INR BLD: 1.18 RATIO — HIGH (ref 0.88–1.16)
INR BLD: 1.95 RATIO — HIGH (ref 0.88–1.16)
MCHC RBC-ENTMCNC: 29.6 PG — SIGNIFICANT CHANGE UP (ref 27–34)
MCHC RBC-ENTMCNC: 30.3 PG — SIGNIFICANT CHANGE UP (ref 27–34)
MCHC RBC-ENTMCNC: 30.4 PG — SIGNIFICANT CHANGE UP (ref 27–34)
MCHC RBC-ENTMCNC: 31 GM/DL — LOW (ref 32–36)
MCHC RBC-ENTMCNC: 31.5 PG — SIGNIFICANT CHANGE UP (ref 27–34)
MCHC RBC-ENTMCNC: 31.8 GM/DL — LOW (ref 32–36)
MCHC RBC-ENTMCNC: 31.8 GM/DL — LOW (ref 32–36)
MCHC RBC-ENTMCNC: 33.5 GM/DL — SIGNIFICANT CHANGE UP (ref 32–36)
MCV RBC AUTO: 94 FL — SIGNIFICANT CHANGE UP (ref 80–100)
MCV RBC AUTO: 95.3 FL — SIGNIFICANT CHANGE UP (ref 80–100)
MCV RBC AUTO: 95.4 FL — SIGNIFICANT CHANGE UP (ref 80–100)
MCV RBC AUTO: 95.4 FL — SIGNIFICANT CHANGE UP (ref 80–100)
O2 CT VFR BLD CALC: 38 MMHG — SIGNIFICANT CHANGE UP (ref 30–65)
PCO2 BLDMV: 38 MMHG — SIGNIFICANT CHANGE UP (ref 30–65)
PH BLDMV: 7.41 — SIGNIFICANT CHANGE UP (ref 7.32–7.45)
PLATELET # BLD AUTO: 197 K/UL — SIGNIFICANT CHANGE UP (ref 150–400)
PLATELET # BLD AUTO: 238 K/UL — SIGNIFICANT CHANGE UP (ref 150–400)
PLATELET # BLD AUTO: 248 K/UL — SIGNIFICANT CHANGE UP (ref 150–400)
PLATELET # BLD AUTO: 266 K/UL — SIGNIFICANT CHANGE UP (ref 150–400)
POTASSIUM SERPL-MCNC: 3.9 MMOL/L — SIGNIFICANT CHANGE UP (ref 3.5–5.3)
POTASSIUM SERPL-MCNC: 4.1 MMOL/L — SIGNIFICANT CHANGE UP (ref 3.5–5.3)
POTASSIUM SERPL-SCNC: 3.9 MMOL/L — SIGNIFICANT CHANGE UP (ref 3.5–5.3)
POTASSIUM SERPL-SCNC: 4.1 MMOL/L — SIGNIFICANT CHANGE UP (ref 3.5–5.3)
PROT SERPL-MCNC: 5.7 G/DL — LOW (ref 6–8.3)
PROT SERPL-MCNC: 6 G/DL — SIGNIFICANT CHANGE UP (ref 6–8.3)
PROTHROM AB SERPL-ACNC: 12.8 SEC — HIGH (ref 9.8–12.7)
PROTHROM AB SERPL-ACNC: 21.3 SEC — HIGH (ref 9.8–12.7)
RBC # BLD: 3.08 M/UL — LOW (ref 4.2–5.8)
RBC # BLD: 3.34 M/UL — LOW (ref 4.2–5.8)
RBC # BLD: 3.38 M/UL — LOW (ref 4.2–5.8)
RBC # BLD: 3.43 M/UL — LOW (ref 4.2–5.8)
RBC # FLD: 15.7 % — HIGH (ref 10.3–14.5)
RBC # FLD: 15.8 % — HIGH (ref 10.3–14.5)
RBC # FLD: 16 % — HIGH (ref 10.3–14.5)
RBC # FLD: 16.1 % — HIGH (ref 10.3–14.5)
SAO2 % BLDMV: 69 % — SIGNIFICANT CHANGE UP (ref 60–90)
SODIUM SERPL-SCNC: 141 MMOL/L — SIGNIFICANT CHANGE UP (ref 135–145)
SODIUM SERPL-SCNC: 145 MMOL/L — SIGNIFICANT CHANGE UP (ref 135–145)
TACROLIMUS SERPL-MCNC: 6.5 NG/ML — SIGNIFICANT CHANGE UP
WBC # BLD: 15.5 K/UL — HIGH (ref 3.8–10.5)
WBC # BLD: 17.5 K/UL — HIGH (ref 3.8–10.5)
WBC # BLD: 18.1 K/UL — HIGH (ref 3.8–10.5)
WBC # BLD: 19.5 K/UL — HIGH (ref 3.8–10.5)
WBC # FLD AUTO: 15.5 K/UL — HIGH (ref 3.8–10.5)
WBC # FLD AUTO: 17.5 K/UL — HIGH (ref 3.8–10.5)
WBC # FLD AUTO: 18.1 K/UL — HIGH (ref 3.8–10.5)
WBC # FLD AUTO: 19.5 K/UL — HIGH (ref 3.8–10.5)

## 2018-03-01 PROCEDURE — 99291 CRITICAL CARE FIRST HOUR: CPT

## 2018-03-01 PROCEDURE — 88350 IMFLUOR EA ADDL 1ANTB STN PX: CPT | Mod: 26

## 2018-03-01 PROCEDURE — 93451 RIGHT HEART CATH: CPT | Mod: 26,59

## 2018-03-01 PROCEDURE — 93505 ENDOMYOCARDIAL BIOPSY: CPT | Mod: 26

## 2018-03-01 PROCEDURE — 71045 X-RAY EXAM CHEST 1 VIEW: CPT | Mod: 26,76

## 2018-03-01 PROCEDURE — 36620 INSERTION CATHETER ARTERY: CPT | Mod: 58

## 2018-03-01 PROCEDURE — 93308 TTE F-UP OR LMTD: CPT | Mod: 26

## 2018-03-01 PROCEDURE — 36556 INSERT NON-TUNNEL CV CATH: CPT | Mod: 58

## 2018-03-01 PROCEDURE — 99292 CRITICAL CARE ADDL 30 MIN: CPT

## 2018-03-01 PROCEDURE — 93321 DOPPLER ECHO F-UP/LMTD STD: CPT | Mod: 26

## 2018-03-01 PROCEDURE — 99233 SBSQ HOSP IP/OBS HIGH 50: CPT

## 2018-03-01 PROCEDURE — 88346 IMFLUOR 1ST 1ANTB STAIN PX: CPT | Mod: 26

## 2018-03-01 PROCEDURE — 99232 SBSQ HOSP IP/OBS MODERATE 35: CPT

## 2018-03-01 PROCEDURE — 88307 TISSUE EXAM BY PATHOLOGIST: CPT | Mod: 26

## 2018-03-01 PROCEDURE — 88342 IMHCHEM/IMCYTCHM 1ST ANTB: CPT | Mod: 26

## 2018-03-01 PROCEDURE — 88341 IMHCHEM/IMCYTCHM EA ADD ANTB: CPT | Mod: 26

## 2018-03-01 RX ORDER — PANTOPRAZOLE SODIUM 20 MG/1
40 TABLET, DELAYED RELEASE ORAL
Qty: 0 | Refills: 0 | Status: DISCONTINUED | OUTPATIENT
Start: 2018-03-01 | End: 2018-03-12

## 2018-03-01 RX ORDER — VALGANCICLOVIR 450 MG/1
450 TABLET, FILM COATED ORAL EVERY 12 HOURS
Qty: 0 | Refills: 0 | Status: DISCONTINUED | OUTPATIENT
Start: 2018-03-01 | End: 2018-03-16

## 2018-03-01 RX ORDER — MEROPENEM 1 G/30ML
1000 INJECTION INTRAVENOUS EVERY 8 HOURS
Qty: 0 | Refills: 0 | Status: DISCONTINUED | OUTPATIENT
Start: 2018-03-01 | End: 2018-03-05

## 2018-03-01 RX ORDER — TACROLIMUS 5 MG/1
7 CAPSULE ORAL
Qty: 0 | Refills: 0 | Status: DISCONTINUED | OUTPATIENT
Start: 2018-03-01 | End: 2018-03-02

## 2018-03-01 RX ORDER — TACROLIMUS 5 MG/1
2 CAPSULE ORAL ONCE
Qty: 0 | Refills: 0 | Status: COMPLETED | OUTPATIENT
Start: 2018-03-01 | End: 2018-03-01

## 2018-03-01 RX ADMIN — Medication 500000 UNIT(S): at 06:24

## 2018-03-01 RX ADMIN — Medication 500000 UNIT(S): at 00:14

## 2018-03-01 RX ADMIN — TACROLIMUS 5 MILLIGRAM(S): 5 CAPSULE ORAL at 08:45

## 2018-03-01 RX ADMIN — Medication 25 MILLIGRAM(S): at 20:59

## 2018-03-01 RX ADMIN — PANTOPRAZOLE SODIUM 40 MILLIGRAM(S): 20 TABLET, DELAYED RELEASE ORAL at 17:44

## 2018-03-01 RX ADMIN — ARGATROBAN 9.32 MICROGRAM(S)/KG/MIN: 50 INJECTION, SOLUTION INTRAVENOUS at 11:48

## 2018-03-01 RX ADMIN — Medication 25 MILLIGRAM(S): at 09:11

## 2018-03-01 RX ADMIN — MYCOPHENOLATE MOFETIL 1000 MILLIGRAM(S): 250 CAPSULE ORAL at 20:59

## 2018-03-01 RX ADMIN — Medication 500000 UNIT(S): at 14:27

## 2018-03-01 RX ADMIN — Medication 9.32 MICROGRAM(S)/KG/MIN: at 08:45

## 2018-03-01 RX ADMIN — VALGANCICLOVIR 450 MILLIGRAM(S): 450 TABLET, FILM COATED ORAL at 21:00

## 2018-03-01 RX ADMIN — Medication 100 MILLIGRAM(S): at 14:26

## 2018-03-01 RX ADMIN — TACROLIMUS 7 MILLIGRAM(S): 5 CAPSULE ORAL at 20:59

## 2018-03-01 RX ADMIN — TACROLIMUS 2 MILLIGRAM(S): 5 CAPSULE ORAL at 14:25

## 2018-03-01 RX ADMIN — Medication 9.32 MICROGRAM(S)/KG/MIN: at 05:03

## 2018-03-01 RX ADMIN — ARGATROBAN 9.32 MICROGRAM(S)/KG/MIN: 50 INJECTION, SOLUTION INTRAVENOUS at 21:36

## 2018-03-01 RX ADMIN — MYCOPHENOLATE MOFETIL 1000 MILLIGRAM(S): 250 CAPSULE ORAL at 08:45

## 2018-03-01 RX ADMIN — MEROPENEM 100 MILLIGRAM(S): 1 INJECTION INTRAVENOUS at 21:36

## 2018-03-01 RX ADMIN — Medication 500000 UNIT(S): at 17:44

## 2018-03-01 RX ADMIN — Medication 1 TABLET(S): at 21:00

## 2018-03-01 RX ADMIN — SODIUM CHLORIDE 10 MILLILITER(S): 9 INJECTION INTRAMUSCULAR; INTRAVENOUS; SUBCUTANEOUS at 11:49

## 2018-03-01 RX ADMIN — MEROPENEM 100 MILLIGRAM(S): 1 INJECTION INTRAVENOUS at 17:49

## 2018-03-01 NOTE — PROGRESS NOTE ADULT - SUBJECTIVE AND OBJECTIVE BOX
INFECTIOUS DISEASES FOLLOW UP-- Sujey Lujan  [phone]    This is a follow up note for this  67yMale with s/p OHTx, over the past 24hs. underwent a cardiac biopsy, remains on hi-fl;ow nasal oxygen with absent BS at right base from atelectasis ? early pneumonia WBC 15--18.5k      ROS:  CONSTITUTIONAL:  No fever,   CARDIOVASCULAR:  No chest pain or palpitations  RESPIRATORY:  No dyspnea on hi-flow  GASTROINTESTINAL:  No nausea, vomiting, diarrhea, or abdominal pain  GENITOURINARY:  No dysuria  NEUROLOGIC:  No headache,     Allergies    No Known Allergies    Intolerances        ANTIBIOTICS/RELEVANT:  antimicrobials  nystatin    Suspension 760529 Unit(s) Oral every 6 hours    immunologic:  mycophenolate mofetil 1000 milliGRAM(s) Oral <User Schedule>  tacrolimus 7 milliGRAM(s) Oral every 12 hours    OTHER:  argatroban Infusion 2 MICROgram(s)/kG/Min IV Continuous <Continuous>  DOBUTamine Infusion 4 MICROgram(s)/kG/Min IV Continuous <Continuous>  docusate sodium 100 milliGRAM(s) Oral three times a day  insulin lispro (HumaLOG) corrective regimen sliding scale   SubCutaneous three times a day before meals  insulin lispro (HumaLOG) corrective regimen sliding scale   SubCutaneous at bedtime  pantoprazole    Tablet 40 milliGRAM(s) Oral before breakfast  predniSONE   Tablet 25 milliGRAM(s) Oral <User Schedule>  sodium chloride 0.9%. 1000 milliLiter(s) IV Continuous <Continuous>  vasopressin Infusion 0.1 Unit(s)/Min IV Continuous <Continuous>      Objective:  Vital Signs Last 24 Hrs  T(C): 36.5 (01 Mar 2018 09:00), Max: 36.6 (28 Feb 2018 19:00)  T(F): 97.7 (01 Mar 2018 09:00), Max: 97.9 (28 Feb 2018 19:00)  HR: 84 (01 Mar 2018 12:40) (84 - 97)  BP: 132/75 (28 Feb 2018 16:00) (132/75 - 132/75)  BP(mean): 96 (28 Feb 2018 16:00) (96 - 96)  RR: 13 (01 Mar 2018 12:40) (13 - 28)  SpO2: 100% (01 Mar 2018 12:40) (89% - 100%)    PHYSICAL EXAM:  Constitutional:no acute distress but was a bit confused earlier today  Eyes:WALT, EOMI  Ear/Nose/Throat: no oral lesions, 	  Respiratory: clear on left side, right side with absent BS  Cardiovascular: S1S2 sternal wound dressing dry and intact  Gastrointestinal:soft, (+) BS, no tenderness  Extremities:no e/e/c  No Lymphadenopathy  IV sites not inflammed.    LABS:                        10.0   18.1  )-----------( 238      ( 01 Mar 2018 12:46 )             32.2     03-01    145  |  108  |  40<H>  ----------------------------<  89  3.9   |  25  |  1.08    Ca    7.9<L>      01 Mar 2018 12:46    TPro  6.0  /  Alb  3.4  /  TBili  0.7  /  DBili  x   /  AST  24  /  ALT  14  /  AlkPhos  58  03-01    PT/INR - ( 01 Mar 2018 12:46 )   PT: 12.8 sec;   INR: 1.18 ratio         PTT - ( 01 Mar 2018 12:46 )  PTT:28.5 sec      MICROBIOLOGY:            RECENT CULTURES:  02-23 @ 16:56  Pericardial Pericardial Fluid  --  --  --    No growth  --      RADIOLOGY & ADDITIONAL STUDIES:    < from: Xray Chest 1 View- PORTABLE-Urgent (03.01.18 @ 12:15) >  IMPRESSION:     Interval removal of pulmonary [place]idy catheter.    Redemonstration of a right basilar opacity  compatible with right pleural   effusion and right lower lobe pneumonia and/or atelectasis.    < end of copied text >

## 2018-03-01 NOTE — PROCEDURE NOTE - NSINFORMCONSENT_GEN_A_CORE
Benefits, risks, and possible complications of procedure explained to patient/caregiver who verbalized understanding and gave verbal consent.
This was an emergent procedure.
This was an emergent procedure.
Benefits, risks, and possible complications of procedure explained to patient/caregiver who verbalized understanding and gave written consent.

## 2018-03-01 NOTE — PROCEDURE NOTE - NSSITEPREP_SKIN_A_CORE
chlorhexidine
chlorhexidine/povidone-iodine ( under 2 weeks of age or 1500 grams)
povidone iodine (if allergic to chlorhexidine)
povidone iodine (if allergic to chlorhexidine)
chlorhexidine

## 2018-03-01 NOTE — PROGRESS NOTE ADULT - ATTENDING COMMENTS
resume argatraban without bolus if bleeding risk acceptable post biopsy  Platelets uptrending - followup seratonin release assay  If displays stability can consider fondaparinux, but would keep argatraban for now.       Daysi  [id number]

## 2018-03-01 NOTE — PROGRESS NOTE ADULT - ASSESSMENT
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 Patient on broad federica-operative prophylaxis to Vancomycin/Meropenem/Fluconazole based upon his prior LVAD placement and prolonged hospitalization pre-transplant. Intermediate risk for CMV (seropositive); intermediate risk for toxoplasmosis (seropositive R). Patient with absent BS at right base atelectasis vs early pneumonia    - Plan:  - RLL atelectasis or early pneumonia  - please send blood cultures  start Meropenem 1 gram IV q 8hr  - begin CMV prophylaxis with intermediate risk  Valcyte 450mg q 12hr.    Gary Lujan MD  [phone]  After 5pm/weekends [phone]

## 2018-03-01 NOTE — PROGRESS NOTE ADULT - ASSESSMENT
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     1. Bilateral DVT: Acute, occlusive DVT of the right internal jugular, innominate, and subclavian veins and acute, occlusive DVT of the left subclavian vein along with superficial thrombosis of the right cephalic vein.    DVT appears acute and there is a high risk of PE due to the extension of the DVT into the subclavian vein.   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     Will follow

## 2018-03-01 NOTE — PROCEDURE NOTE - NSPROCDETAILS_GEN_ALL_CORE
sutured in place/location identified, draped/prepped, sterile technique used, needle inserted/introduced/Seldinger technique
sterile dressing applied/sterile technique, catheter placed/guidewire recovered
ultrasound guidance/sterile dressing applied/sterile technique, catheter placed/guidewire recovered/lumen(s) aspirated and flushed
location identified, draped/prepped, sterile technique used, needle inserted/introduced/connected to a pressurized flush line/sutured in place/positive blood return obtained via catheter/Seldinger technique/all materials/supplies accounted for at end of procedure/hemostasis with direct pressure, dressing applied
location identified, draped/prepped, sterile technique used, needle inserted/introduced/all materials/supplies accounted for at end of procedure/ultrasound guidance/hemostasis with direct pressure, dressing applied/Seldinger technique

## 2018-03-01 NOTE — PROCEDURE NOTE - NSINDICATIONS_GEN_A_CORE
critical patient
emergency venous access
hemodynamic monitoring/emergency venous access
monitoring purposes/blood sampling
arterial puncture to obtain ABG's

## 2018-03-01 NOTE — PROGRESS NOTE ADULT - SUBJECTIVE AND OBJECTIVE BOX
CRITICAL CARE ATTENDING - CTICU    MEDICATIONS  (STANDING):  argatroban Infusion 2 MICROgram(s)/kG/Min (9.324 mL/Hr) IV Continuous <Continuous>  DOBUTamine Infusion 4 MICROgram(s)/kG/Min (9.324 mL/Hr) IV Continuous <Continuous>  docusate sodium 100 milliGRAM(s) Oral three times a day  insulin lispro (HumaLOG) corrective regimen sliding scale   SubCutaneous three times a day before meals  insulin lispro (HumaLOG) corrective regimen sliding scale   SubCutaneous at bedtime  mycophenolate mofetil 1000 milliGRAM(s) Oral <User Schedule>  nystatin    Suspension [id number] Unit(s) Oral every 6 hours  pantoprazole    Tablet 40 milliGRAM(s) Oral before breakfast  predniSONE   Tablet 25 milliGRAM(s) Oral <User Schedule>  sodium chloride 0.9%. 1000 milliLiter(s) (10 mL/Hr) IV Continuous <Continuous>  tacrolimus 5 milliGRAM(s) Oral <User Schedule>  vasopressin Infusion 0.1 Unit(s)/Min (6 mL/Hr) IV Continuous <Continuous>                                    9.7    15.5  )-----------( 197      ( 01 Mar 2018 02:18 )             29.0       03-01    141  |  104  |  44<H>  ----------------------------<  94  4.1   |  25  |  1.18    Ca    7.9<L>      01 Mar 2018 02:18    TPro  5.7<L>  /  Alb  3.4  /  TBili  0.6  /  DBili  x   /  AST  24  /  ALT  13  /  AlkPhos  58  03-      PT/INR - ( 2018 00:49 )   PT: 14.0 sec;   INR: 1.29 ratio         PTT - ( 2018 17:08 )  PTT:64.9 sec        Daily     Daily Weight in k.2 (01 Mar 2018 00:00)       @ 07:01  -  - @ 07:00  --------------------------------------------------------  IN: 1483.1 mL / OUT: 2245 mL / NET: -761.9 mL     @ 07:01   @ 10:49  --------------------------------------------------------  IN: 56.3 mL / OUT: 125 mL / NET: -68.7 mL        Critically Ill patient  : [ ] preoperative ,   [x ] post operative    Requires :  [x ] Arterial Line   [x ] Central Line  [x ] PA catheter  [ ] IABP  [ ] ECMO  [ ] LVAD  [ ] Ventilator  [ ] pacemaker [ ] Impella.    Bedside evaluation , monitoring , treatment of hemodynamics , fluids , IVP/ IVCD meds.        Diagnosis:     POD 6 - Heart Transplant    Acute Graft dysfunction post op    Cardiogenic Shock - resolving    CHF- acute [x ]   chronic [ ]    systolic [x ]   diatolic [ ]          - Echo- EF -             [x RV dysfunction          - Cxr-cardiomegally, edema          - Clinical-  [x ]inotropes   [x ]pressors   [ x]diuresis   [ ]IABP   [ ]ECMO   [ ]LVAD   [ ]Respiratory Failure    Myocardial biopsy this AM - r/o rejection    fluid overload     [place] Cassidy catheter interpretation and therapeutic management of unstable hemodynamics     Hypoxia - increased A-a gradient    Hemodynamic lability,instability. Requires IVCD [ x] vasopressors [x ] inotropes  [ ] vasodilator  [ ]IVSS fluid  to maintain MAP, perfusion, C.I.    RLL atelectasis - r/o effusion - sonosite R chest today    Temporary pacemaker (TPM) interrogation and setting.     IVCD anticoagulation with  [x ] Argatroban for upper extremity thrombosis - r/o HIT    h/o UGI bleedeing                                     -                     Discussed with CT surgeon, Physician's Assistant - Nurse Practitioner- Critical care medicine team.   Dicussed at  AM / PM rounds.   Chart, labs , films reviewed.    Total Time: 120 min.

## 2018-03-01 NOTE — PROCEDURE NOTE - NSPOSTPRCRAD_GEN_A_CORE
line adjusted to depth of insertion/post-procedure radiography performed/central line located in the superior vena cava
post procedure radiography not performed/central line located in the superior vena cava

## 2018-03-01 NOTE — PROGRESS NOTE ADULT - ASSESSMENT
Mr. 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 Underwent first biopsy today (3/1) with prelim results c/w AMR but possibly low-grade.     Immunosuppression prophylaxis:  Tacrolimus - started on prograf 2/25; received 5 mg last evening at 20:00; tac level 6.5; give 2 mg extra and increase to 7 mg q12; would check level this evening; please check levels at 7:30 am (stat)  Continue CellCept 1000 mg PO BID  Steroids - continue taper; on prednisone 25 mg PO q12h; reduce to 20 mg q12 tomorrow  	  Antimicrobial prophylaxis:  Intermediate risk CMV - start oral valganciclovir 450 mg q12  Intermediate risk Toxo - on Bactrim PPx  PCP - started on Bactrim 1 SS tab daily     Graft function:  LV function improved; persistent mild RV dysfunction however excellent HD off inotropes  underwent biospy 3/1; prelim positive for AMR; DSA resent; if negative, will hold off on any further plasmapharesis/IVIG    Hypoxia:  likely d/t atelectasis; improving    Other prophylaxis:  Protonix 40 mg PO daily for GI prophylaxis while on steroids.    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

## 2018-03-01 NOTE — PROCEDURE NOTE - NSPROCNAME_GEN_A_CORE
Arterial Puncture/Cannulation
Arterial Puncture/Cannulation
Central Line Insertion
Central Line Insertion
Arterial Puncture/Cannulation

## 2018-03-01 NOTE — PROCEDURE NOTE - NSPOSTCAREGUIDE_GEN_A_CORE
Verbal/written post procedure instructions were given to patient/caregiver/Instructed patient/caregiver regarding signs and symptoms of infection/Care for catheter as per unit/ICU protocols/Keep the cast/splint/dressing clean and dry
Care for catheter as per unit/ICU protocols/Verbal/written post procedure instructions were given to patient/caregiver
Verbal/written post procedure instructions were given to patient/caregiver/Care for catheter as per unit/ICU protocols
Verbal/written post procedure instructions were given to patient/caregiver/Care for catheter as per unit/ICU protocols

## 2018-03-01 NOTE — PROGRESS NOTE ADULT - SUBJECTIVE AND OBJECTIVE BOX
Interval History:  doing well  off dobut/Kamala with excellent BPs  CXR with RLL atelectasis  underwent RHC w/ biopsy which showed excellent HD; prelim biopsy report read as positive for AMR    Medications:  argatroban Infusion 2 MICROgram(s)/kG/Min IV Continuous <Continuous>  docusate sodium 100 milliGRAM(s) Oral three times a day  insulin lispro (HumaLOG) corrective regimen sliding scale   SubCutaneous three times a day before meals  insulin lispro (HumaLOG) corrective regimen sliding scale   SubCutaneous at bedtime  meropenem  IVPB 1000 milliGRAM(s) IV Intermittent every 8 hours  mycophenolate mofetil 1000 milliGRAM(s) Oral <User Schedule>  nystatin    Suspension 639388 Unit(s) Oral every 6 hours  pantoprazole  Injectable 40 milliGRAM(s) IV Push two times a day  sodium chloride 0.9%. 1000 milliLiter(s) IV Continuous <Continuous>  tacrolimus 7 milliGRAM(s) Oral <User Schedule>  trimethoprim   80 mG/sulfamethoxazole 400 mG 1 Tablet(s) Oral daily  valGANciclovir 450 milliGRAM(s) Oral every 12 hours    Vitals:  T(C): 37 (18 @ 19:00), Max: 37 (18 @ 19:00)  HR: 86 (18 @ 22:00) (82 - 97)  ABP: 112/60 (18 @ 22:00) (112/60 - 168/69)  ABP(mean): 76 (18 @ 22:00) (76 - 106)  RR: 12 (18 @ 22:00) (0 - 25)  SpO2: 100% (18 @ 22:00) (87% - 100%)  CO: 10.8 (18 @ 07:00) (8.2 - 12.2)  CI: 5.6 (18 @ 07:00) (4.2 - 6.3)  PA: 46/18 (18 @ 09:00) (37/11 - 47/19)  PA(mean): 26 (18 @ 09:00) (19 - 28)  SVR: 562 (18 @ 07:00) (537 - 799)    Daily     Daily Weight in k.2 (01 Mar 2018 00:00)    I&O's Summary    2018 07:  -  01 Mar 2018 07:00  --------------------------------------------------------  IN: 1483.1 mL / OUT: 2245 mL / NET: -761.9 mL    01 Mar 2018 07:  -  01 Mar 2018 22:53  --------------------------------------------------------  IN: 561.4 mL / OUT: 865 mL / NET: -303.6 mL    Physical Exam:  Appearance: No Acute Distress. Facial swelling.   HEENT: No JVD  Cardiovascular: Normal S1 S2, No murmurs/rubs/gallops  Respiratory: Clear to auscultation bilaterally  Gastrointestinal: Soft, Non-tender	  Skin: No cyanosis	  Neurologic: Non-focal  Extremities: No LE edema; swelling of UE bilaterally   Psychiatry: A & O x 3, Mood & affect appropriate    Labs:                        10.4   19.5  )-----------( 266      ( 01 Mar 2018 21:08 )             32.7     03-    145  |  108  |  40<H>  ----------------------------<  89  3.9   |  25  |  1.08    Ca    7.9<L>      01 Mar 2018 12:46    TPro  6.0  /  Alb  3.4  /  TBili  0.7  /  DBili  x   /  AST  24  /  ALT  14  /  AlkPhos  58      PT/INR - ( 01 Mar 2018 18:51 )   PT: 21.3 sec;   INR: 1.95 ratio         PTT - ( 01 Mar 2018 18:51 )  PTT:57.0 sec      Serum Pro-Brain Natriuretic Peptide: 3895 pg/mL ( @ 02:49)  Serum Pro-Brain Natriuretic Peptide: 9103 pg/mL ( @ 02:14)    Oxygen Saturation, Mixed: 65 % ( @ 09:10)  Oxygen Saturation, Mixed: 69 % ( @ 02:16)  Oxygen Saturation, Mixed: 71 % ( @ 22:25)  Oxygen Saturation, Mixed: 70 % ( @ 17:24)

## 2018-03-01 NOTE — PROGRESS NOTE ADULT - SUBJECTIVE AND OBJECTIVE BOX
HPI:  Plan for endomyocardial biopsy today. Currently in the cathlab    Review Of Systems:  [x ] 10 point review of systems is otherwise negative except as mentioned above               Medications:  albumin human 25% IVPB 50 milliLiter(s) IV Intermittent once  argatroban Infusion 2 MICROgram(s)/kG/Min IV Continuous <Continuous>  DOBUTamine Infusion 5 MICROgram(s)/kG/Min IV Continuous <Continuous>  docusate sodium 100 milliGRAM(s) Oral three times a day  insulin lispro (HumaLOG) corrective regimen sliding scale   SubCutaneous three times a day before meals  insulin lispro (HumaLOG) corrective regimen sliding scale   SubCutaneous at bedtime  mycophenolate mofetil 1000 milliGRAM(s) Oral <User Schedule>  nystatin    Suspension 333358 Unit(s) Oral every 6 hours  pantoprazole    Tablet 40 milliGRAM(s) Oral before breakfast  predniSONE   Tablet 30 milliGRAM(s) Oral <User Schedule>  sodium chloride 0.9%. 1000 milliLiter(s) IV Continuous <Continuous>  tacrolimus 5 milliGRAM(s) Oral <User Schedule>  vasopressin Infusion 0.1 Unit(s)/Min IV Continuous <Continuous>    PMH/PSH/FH/SH: [ ] Unchanged  ICU Vital Signs Last 24 Hrs  T(C): 36.5 (01 Mar 2018 09:00), Max: 36.6 (28 Feb 2018 19:00)  T(F): 97.7 (01 Mar 2018 09:00), Max: 97.9 (28 Feb 2018 19:00)  HR: 88 (01 Mar 2018 09:00) (88 - 97)  BP: 132/75 (28 Feb 2018 16:00) (132/75 - 132/75)  BP(mean): 96 (28 Feb 2018 16:00) (96 - 96)  ABP: 149/71 (01 Mar 2018 09:00) (103/59 - 168/69)  ABP(mean): 90 (01 Mar 2018 09:00) (68 - 111)  RR: 23 (01 Mar 2018 09:00) (0 - 34)  SpO2: 100% (01 Mar 2018 09:00) (89% - 100%)    I&O's Summary  28 Feb 2018 07:01  -  01 Mar 2018 07:00  --------------------------------------------------------  IN: 1483.1 mL / OUT: 2245 mL / NET: -761.9 mL    Physical Exam:  Appearance: On Nasal cannula /Nitric oxide, able to talk with mild SOB  Eyes: [ x] PERRL [x ] EOMI  HEENT: [x ] Normal oral [place] [x ]NC/AT  Cardiovascular: [x ] S1 [x ] S2, tachycardic,  [ x] No m/r/g  bilateral Upper extremity edema, + JVD, No LE edema  sternotomy site covered with clean dressings  Right femoral vein [place]-chi in place.   Respiratory: Decreased BS b/l bases  Gastrointestinal: [x ] Soft [x ] Non-tender [x ] Non-distended [x ] BS+  Musculoskeletal: [x ] No clubbing [x ] No joint deformity   Neurologic: [x ] Non-focal  Lymphatic: [x ] No lymphadenopathy  Psychiatry: [x ] AAOx3 [x ] Mood & affect appropriate  Skin: [x ] No rashes [x ] No ecchymoses [x ] No cyanosis               9.7    15.5  )-----------( 197      ( 01 Mar 2018 02:18 )             29.0   03-01    141  |  104  |  44<H>  ----------------------------<  94  4.1   |  25  |  1.18    Ca    7.9<L>      01 Mar 2018 02:18    TPro  5.7<L>  /  Alb  3.4  /  TBili  0.6  /  DBili  x   /  AST  24  /  ALT  13  /  AlkPhos  58  03-01 2/28/2018  COMPARISON: A prior examination, dated 6/15/2017 shows no DVT..  TECHNIQUE: Duplex sonography of the BILATERAL LOWER extremities with  color and spectral Doppler, with and without compression.    FINDINGS: The right common femoral and femoral veins the proximal thigh  were not imaged because of an indwelling line and overlying dressings.  There is normal compressibility of the right femoral vein in the distal thigh and right popliteal, and the left common femoral, femoral and  popliteal veins.  Doppler examination shows normal spontaneous and phasic flow. No calf vein thrombosis is detected.  IMPRESSION:   No evidence of bilateral lower extremity deep venous thrombosis.    Interpretation of Telemetry:  Sinus rhythm 90-110bpmm, PVCs

## 2018-03-02 LAB
ALBUMIN SERPL ELPH-MCNC: 3.1 G/DL — LOW (ref 3.3–5)
ALBUMIN SERPL ELPH-MCNC: 3.3 G/DL — SIGNIFICANT CHANGE UP (ref 3.3–5)
ALP SERPL-CCNC: 57 U/L — SIGNIFICANT CHANGE UP (ref 40–120)
ALP SERPL-CCNC: 58 U/L — SIGNIFICANT CHANGE UP (ref 40–120)
ALT FLD-CCNC: 11 U/L RC — SIGNIFICANT CHANGE UP (ref 10–45)
ALT FLD-CCNC: 13 U/L RC — SIGNIFICANT CHANGE UP (ref 10–45)
ANION GAP SERPL CALC-SCNC: 12 MMOL/L — SIGNIFICANT CHANGE UP (ref 5–17)
ANION GAP SERPL CALC-SCNC: 12 MMOL/L — SIGNIFICANT CHANGE UP (ref 5–17)
APTT BLD: 34.8 SEC — SIGNIFICANT CHANGE UP (ref 27.5–37.4)
APTT BLD: 56.1 SEC — HIGH (ref 27.5–37.4)
APTT BLD: 59 SEC — HIGH (ref 27.5–37.4)
AST SERPL-CCNC: 18 U/L — SIGNIFICANT CHANGE UP (ref 10–40)
AST SERPL-CCNC: 25 U/L — SIGNIFICANT CHANGE UP (ref 10–40)
BILIRUB SERPL-MCNC: 0.6 MG/DL — SIGNIFICANT CHANGE UP (ref 0.2–1.2)
BILIRUB SERPL-MCNC: 0.6 MG/DL — SIGNIFICANT CHANGE UP (ref 0.2–1.2)
BUN SERPL-MCNC: 38 MG/DL — HIGH (ref 7–23)
BUN SERPL-MCNC: 41 MG/DL — HIGH (ref 7–23)
CALCIUM SERPL-MCNC: 7.9 MG/DL — LOW (ref 8.4–10.5)
CALCIUM SERPL-MCNC: 8.1 MG/DL — LOW (ref 8.4–10.5)
CHLORIDE SERPL-SCNC: 107 MMOL/L — SIGNIFICANT CHANGE UP (ref 96–108)
CHLORIDE SERPL-SCNC: 108 MMOL/L — SIGNIFICANT CHANGE UP (ref 96–108)
CO2 SERPL-SCNC: 23 MMOL/L — SIGNIFICANT CHANGE UP (ref 22–31)
CO2 SERPL-SCNC: 24 MMOL/L — SIGNIFICANT CHANGE UP (ref 22–31)
CREAT SERPL-MCNC: 1.06 MG/DL — SIGNIFICANT CHANGE UP (ref 0.5–1.3)
CREAT SERPL-MCNC: 1.1 MG/DL — SIGNIFICANT CHANGE UP (ref 0.5–1.3)
GAS PNL BLDA: SIGNIFICANT CHANGE UP
GLUCOSE SERPL-MCNC: 102 MG/DL — HIGH (ref 70–99)
GLUCOSE SERPL-MCNC: 104 MG/DL — HIGH (ref 70–99)
GRAM STN FLD: SIGNIFICANT CHANGE UP
HCT VFR BLD CALC: 31.6 % — LOW (ref 39–50)
HCT VFR BLD CALC: 32 % — LOW (ref 39–50)
HGB BLD-MCNC: 10.4 G/DL — LOW (ref 13–17)
HGB BLD-MCNC: 10.6 G/DL — LOW (ref 13–17)
INR BLD: 1.26 RATIO — HIGH (ref 0.88–1.16)
INR BLD: 2.13 RATIO — HIGH (ref 0.88–1.16)
INR BLD: 2.18 RATIO — HIGH (ref 0.88–1.16)
LDH SERPL L TO P-CCNC: 409 U/L — HIGH (ref 50–242)
MAGNESIUM SERPL-MCNC: 2.8 MG/DL — HIGH (ref 1.6–2.6)
MCHC RBC-ENTMCNC: 31.5 PG — SIGNIFICANT CHANGE UP (ref 27–34)
MCHC RBC-ENTMCNC: 31.7 PG — SIGNIFICANT CHANGE UP (ref 27–34)
MCHC RBC-ENTMCNC: 33 GM/DL — SIGNIFICANT CHANGE UP (ref 32–36)
MCHC RBC-ENTMCNC: 33.1 GM/DL — SIGNIFICANT CHANGE UP (ref 32–36)
MCV RBC AUTO: 95.6 FL — SIGNIFICANT CHANGE UP (ref 80–100)
MCV RBC AUTO: 95.8 FL — SIGNIFICANT CHANGE UP (ref 80–100)
NT-PROBNP SERPL-SCNC: 8160 PG/ML — HIGH (ref 0–300)
PHOSPHATE SERPL-MCNC: 2.3 MG/DL — LOW (ref 2.5–4.5)
PHOSPHATE SERPL-MCNC: 3.2 MG/DL — SIGNIFICANT CHANGE UP (ref 2.5–4.5)
PLATELET # BLD AUTO: 290 K/UL — SIGNIFICANT CHANGE UP (ref 150–400)
PLATELET # BLD AUTO: 290 K/UL — SIGNIFICANT CHANGE UP (ref 150–400)
POTASSIUM SERPL-MCNC: 4 MMOL/L — SIGNIFICANT CHANGE UP (ref 3.5–5.3)
POTASSIUM SERPL-MCNC: 4.6 MMOL/L — SIGNIFICANT CHANGE UP (ref 3.5–5.3)
POTASSIUM SERPL-SCNC: 4 MMOL/L — SIGNIFICANT CHANGE UP (ref 3.5–5.3)
POTASSIUM SERPL-SCNC: 4.6 MMOL/L — SIGNIFICANT CHANGE UP (ref 3.5–5.3)
PROCALCITONIN SERPL-MCNC: 6.49 NG/ML — HIGH (ref 0–0.04)
PROT SERPL-MCNC: 5.8 G/DL — LOW (ref 6–8.3)
PROT SERPL-MCNC: 5.8 G/DL — LOW (ref 6–8.3)
PROTHROM AB SERPL-ACNC: 13.8 SEC — HIGH (ref 9.8–12.7)
PROTHROM AB SERPL-ACNC: 23.4 SEC — HIGH (ref 9.8–12.7)
PROTHROM AB SERPL-ACNC: 23.9 SEC — HIGH (ref 9.8–12.7)
RBC # BLD: 3.29 M/UL — LOW (ref 4.2–5.8)
RBC # BLD: 3.35 M/UL — LOW (ref 4.2–5.8)
RBC # FLD: 15.8 % — HIGH (ref 10.3–14.5)
RBC # FLD: 15.9 % — HIGH (ref 10.3–14.5)
SODIUM SERPL-SCNC: 143 MMOL/L — SIGNIFICANT CHANGE UP (ref 135–145)
SODIUM SERPL-SCNC: 143 MMOL/L — SIGNIFICANT CHANGE UP (ref 135–145)
SPECIMEN SOURCE: SIGNIFICANT CHANGE UP
SRA INTERP SER-IMP: SIGNIFICANT CHANGE UP
TACROLIMUS SERPL-MCNC: 14.4 NG/ML — SIGNIFICANT CHANGE UP
WBC # BLD: 21.6 K/UL — HIGH (ref 3.8–10.5)
WBC # BLD: 21.9 K/UL — HIGH (ref 3.8–10.5)
WBC # FLD AUTO: 21.6 K/UL — HIGH (ref 3.8–10.5)
WBC # FLD AUTO: 21.9 K/UL — HIGH (ref 3.8–10.5)

## 2018-03-02 PROCEDURE — 99233 SBSQ HOSP IP/OBS HIGH 50: CPT

## 2018-03-02 PROCEDURE — 99292 CRITICAL CARE ADDL 30 MIN: CPT

## 2018-03-02 PROCEDURE — 99232 SBSQ HOSP IP/OBS MODERATE 35: CPT

## 2018-03-02 PROCEDURE — 31624 DX BRONCHOSCOPE/LAVAGE: CPT

## 2018-03-02 PROCEDURE — 71045 X-RAY EXAM CHEST 1 VIEW: CPT | Mod: 26,76

## 2018-03-02 PROCEDURE — 93930 UPPER EXTREMITY STUDY: CPT | Mod: 26

## 2018-03-02 PROCEDURE — 99291 CRITICAL CARE FIRST HOUR: CPT

## 2018-03-02 PROCEDURE — 90832 PSYTX W PT 30 MINUTES: CPT

## 2018-03-02 RX ORDER — IPRATROPIUM/ALBUTEROL SULFATE 18-103MCG
3 AEROSOL WITH ADAPTER (GRAM) INHALATION EVERY 6 HOURS
Qty: 0 | Refills: 0 | Status: DISCONTINUED | OUTPATIENT
Start: 2018-03-02 | End: 2018-04-10

## 2018-03-02 RX ORDER — ALBUMIN HUMAN 25 %
250 VIAL (ML) INTRAVENOUS ONCE
Qty: 0 | Refills: 0 | Status: COMPLETED | OUTPATIENT
Start: 2018-03-02 | End: 2018-03-02

## 2018-03-02 RX ORDER — HALOPERIDOL DECANOATE 100 MG/ML
1 INJECTION INTRAMUSCULAR ONCE
Qty: 0 | Refills: 0 | Status: COMPLETED | OUTPATIENT
Start: 2018-03-02 | End: 2018-03-02

## 2018-03-02 RX ORDER — DEXMEDETOMIDINE HYDROCHLORIDE IN 0.9% SODIUM CHLORIDE 4 UG/ML
0.2 INJECTION INTRAVENOUS
Qty: 200 | Refills: 0 | Status: DISCONTINUED | OUTPATIENT
Start: 2018-03-02 | End: 2018-03-03

## 2018-03-02 RX ORDER — ENOXAPARIN SODIUM 100 MG/ML
40 INJECTION SUBCUTANEOUS DAILY
Qty: 0 | Refills: 0 | Status: DISCONTINUED | OUTPATIENT
Start: 2018-03-02 | End: 2018-03-02

## 2018-03-02 RX ORDER — CALCIUM GLUCONATE 100 MG/ML
1 VIAL (ML) INTRAVENOUS ONCE
Qty: 0 | Refills: 0 | Status: COMPLETED | OUTPATIENT
Start: 2018-03-02 | End: 2018-03-02

## 2018-03-02 RX ORDER — TACROLIMUS 5 MG/1
6 CAPSULE ORAL EVERY 12 HOURS
Qty: 0 | Refills: 0 | Status: DISCONTINUED | OUTPATIENT
Start: 2018-03-02 | End: 2018-03-02

## 2018-03-02 RX ORDER — TACROLIMUS 5 MG/1
5 CAPSULE ORAL
Qty: 0 | Refills: 0 | Status: DISCONTINUED | OUTPATIENT
Start: 2018-03-02 | End: 2018-03-04

## 2018-03-02 RX ORDER — ENOXAPARIN SODIUM 100 MG/ML
40 INJECTION SUBCUTANEOUS
Qty: 0 | Refills: 0 | Status: DISCONTINUED | OUTPATIENT
Start: 2018-03-02 | End: 2018-03-02

## 2018-03-02 RX ORDER — VANCOMYCIN HCL 1 G
1000 VIAL (EA) INTRAVENOUS ONCE
Qty: 0 | Refills: 0 | Status: COMPLETED | OUTPATIENT
Start: 2018-03-02 | End: 2018-03-02

## 2018-03-02 RX ORDER — IPRATROPIUM BROMIDE 0.2 MG/ML
500 SOLUTION, NON-ORAL INHALATION EVERY 6 HOURS
Qty: 0 | Refills: 0 | Status: DISCONTINUED | OUTPATIENT
Start: 2018-03-02 | End: 2018-03-02

## 2018-03-02 RX ORDER — ENOXAPARIN SODIUM 100 MG/ML
70 INJECTION SUBCUTANEOUS
Qty: 0 | Refills: 0 | Status: DISCONTINUED | OUTPATIENT
Start: 2018-03-02 | End: 2018-03-03

## 2018-03-02 RX ADMIN — MYCOPHENOLATE MOFETIL 1000 MILLIGRAM(S): 250 CAPSULE ORAL at 07:40

## 2018-03-02 RX ADMIN — Medication 500000 UNIT(S): at 05:12

## 2018-03-02 RX ADMIN — Medication 500 MILLILITER(S): at 22:43

## 2018-03-02 RX ADMIN — TACROLIMUS 6 MILLIGRAM(S): 5 CAPSULE ORAL at 19:37

## 2018-03-02 RX ADMIN — Medication 62.5 MILLIMOLE(S): at 05:11

## 2018-03-02 RX ADMIN — Medication 3 MILLILITER(S): at 12:31

## 2018-03-02 RX ADMIN — Medication 500000 UNIT(S): at 00:52

## 2018-03-02 RX ADMIN — Medication 200 GRAM(S): at 13:22

## 2018-03-02 RX ADMIN — VALGANCICLOVIR 450 MILLIGRAM(S): 450 TABLET, FILM COATED ORAL at 07:40

## 2018-03-02 RX ADMIN — Medication 20 MILLIGRAM(S): at 07:39

## 2018-03-02 RX ADMIN — Medication 250 MILLIGRAM(S): at 07:39

## 2018-03-02 RX ADMIN — MEROPENEM 100 MILLIGRAM(S): 1 INJECTION INTRAVENOUS at 05:11

## 2018-03-02 RX ADMIN — VALGANCICLOVIR 450 MILLIGRAM(S): 450 TABLET, FILM COATED ORAL at 19:36

## 2018-03-02 RX ADMIN — PANTOPRAZOLE SODIUM 40 MILLIGRAM(S): 20 TABLET, DELAYED RELEASE ORAL at 17:33

## 2018-03-02 RX ADMIN — MYCOPHENOLATE MOFETIL 1000 MILLIGRAM(S): 250 CAPSULE ORAL at 19:37

## 2018-03-02 RX ADMIN — TACROLIMUS 7 MILLIGRAM(S): 5 CAPSULE ORAL at 07:40

## 2018-03-02 RX ADMIN — Medication 3 MILLILITER(S): at 18:22

## 2018-03-02 RX ADMIN — MEROPENEM 100 MILLIGRAM(S): 1 INJECTION INTRAVENOUS at 22:08

## 2018-03-02 RX ADMIN — PANTOPRAZOLE SODIUM 40 MILLIGRAM(S): 20 TABLET, DELAYED RELEASE ORAL at 05:12

## 2018-03-02 RX ADMIN — ENOXAPARIN SODIUM 70 MILLIGRAM(S): 100 INJECTION SUBCUTANEOUS at 10:20

## 2018-03-02 RX ADMIN — Medication 500000 UNIT(S): at 17:33

## 2018-03-02 RX ADMIN — Medication 20 MILLIGRAM(S): at 19:37

## 2018-03-02 RX ADMIN — Medication 500000 UNIT(S): at 12:26

## 2018-03-02 RX ADMIN — MEROPENEM 100 MILLIGRAM(S): 1 INJECTION INTRAVENOUS at 14:00

## 2018-03-02 RX ADMIN — Medication 1 TABLET(S): at 12:26

## 2018-03-02 RX ADMIN — Medication 125 MILLILITER(S): at 13:00

## 2018-03-02 RX ADMIN — ENOXAPARIN SODIUM 70 MILLIGRAM(S): 100 INJECTION SUBCUTANEOUS at 19:36

## 2018-03-02 NOTE — PROGRESS NOTE ADULT - ASSESSMENT
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     Upper extremity DVT  - CHERIE negative  - transition to Lovenox 1mg/kg BID  - plan to repeat upper extremity venous duplex in 1 week  - elevate arms above heart level.  - hold lovenox 12 hours prior to myocardial biopsy      R 3rd digit discoloration  Dr. Peralta to evaluate  Await Upper extremity arterial duplex     Will follow with you    Thanks    Saurabh Tavarez  [phone]

## 2018-03-02 NOTE — PHYSICAL THERAPY INITIAL EVALUATION ADULT - IMPAIRED TRANSFERS: SIT/STAND, REHAB EVAL
narrow base of support/decreased flexibility/impaired balance/decreased strength
decreased strength/impaired balance

## 2018-03-02 NOTE — PHYSICAL THERAPY INITIAL EVALUATION ADULT - LIVES WITH, PROFILE
67 year old male who PTA was living in a  with brother, 3 steps to enter and 2nd floor bedroom., PTA pt was idependent in all functional mobility able to ambulate with a cane and walker. brother helps out from time to time, however pt reports going to the senior center to stay active and taking care of all his needs.
brother

## 2018-03-02 NOTE — CONSULT NOTE ADULT - SUBJECTIVE AND OBJECTIVE BOX
Vascular Surgery Consult  [id number]    CC: 67y old Male admitted with a chief complaint of Elevated LDH levels (01 Feb 2018 19:36)  , now    HPI:  67M PMHx Afib, VT s/p AICD, heart failure s/p LVAD (6/2017) c/b pump thrombosis & GI bleeding, now s/p orthotopic heart transplant (2/23) c/b venous clot, possible acute graft rejection, & hypoxic respiratory failure.     POD3 UE duplex w/ findings of: Acute, occlusive DVT of the right internal jugular, innominate, and subclavian veins. Acute, occlusive DVT of the left subclavian vein. Superficial thrombosis of the right cephalic vein. Patient noted to have R middle finger discoloration.     Concern for HIT, heparin held & patient was started empirically on argatroban pending testing.        PMHx: GIB (gastrointestinal bleeding)  H/O prior ablation treatment  Ventricular fibrillation  PAF (paroxysmal atrial fibrillation)  Non-Ischemic Cardiomyopathy  SVT (Supraventricular Tachycardia)  HTN  CHF (Congestive Heart Failure)    PSHx: LVAD (left ventricular assist device) present  Status post left hip replacement  Hypertension  Hypertension  History of Prior Ablation Treatment  AICD (Automatic Cardioverter/Defibrillator) Present    Medications (inpatient): ALBUTerol/ipratropium for Nebulization 3 milliLiter(s) Nebulizer every 6 hours  docusate sodium 100 milliGRAM(s) Oral three times a day  enoxaparin Injectable 40 milliGRAM(s) SubCutaneous two times a day  insulin lispro (HumaLOG) corrective regimen sliding scale   SubCutaneous three times a day before meals  insulin lispro (HumaLOG) corrective regimen sliding scale   SubCutaneous at bedtime  meropenem  IVPB 1000 milliGRAM(s) IV Intermittent every 8 hours  mycophenolate mofetil 1000 milliGRAM(s) Oral <User Schedule>  nystatin    Suspension 586603 Unit(s) Oral every 6 hours  pantoprazole  Injectable 40 milliGRAM(s) IV Push two times a day  predniSONE   Tablet 20 milliGRAM(s) Oral every 12 hours  sodium chloride 0.9%. 1000 milliLiter(s) IV Continuous <Continuous>  tacrolimus 7 milliGRAM(s) Oral <User Schedule>  trimethoprim   80 mG/sulfamethoxazole 400 mG 1 Tablet(s) Oral daily  valGANciclovir 450 milliGRAM(s) Oral every 12 hours    Medications (PRN): x    Allergies: No Known Allergies  (Intolerances: )  Social Hx:     Physical Exam  T(C): 36.8 (03-02-18 @ 07:00)  HR: 87 (03-02-18 @ 08:35) (82 - 90)  BP: --  RR: 20 (03-02-18 @ 08:35) (0 - 25)  SpO2: 100% (03-02-18 @ 08:35) (87% - 100%)  Wt(kg): --  Tmax: T(C): , Max: 37.1 (03-01-18 @ 23:00)  Wt(kg): --    03-01-18  -  03-02-18  --------------------------------------------------------  IN:    argatroban Infusion: 148.8 mL    DOBUTamine Infusion: 16.3 mL    IV PiggyBack: 375 mL    Oral Fluid: 320 mL    sodium chloride 0.9%.: 170 mL  Total IN: 1030.1 mL    OUT:    Indwelling Catheter - Urethral: 1265 mL  Total OUT: 1265 mL    Total NET: -234.9 mL      03-02-18  -  03-02-18  --------------------------------------------------------  IN:    IV PiggyBack: 312.5 mL  Total IN: 312.5 mL    OUT:    Indwelling Catheter - Urethral: 40 mL  Total OUT: 40 mL    Total NET: 272.5 mL        General: well developed, well nourished, NAD  Neuro: alert and oriented, no focal deficits, moves all extremities spontaneously  HEENT: NCAT, EOMI, anicteric, mucosa moist  Respiratory: airway patent, respirations unlabored  CVS: regular rate and rhythm  Abdomen: soft, nontender, nondistended  Extremities: no edema, sensation and movement grossly intact  Skin: warm, dry, appropriate color    Labs:                        10.4   21.6  )-----------( 290      ( 02 Mar 2018 03:34 )             31.6     PT/INR - ( 02 Mar 2018 05:33 )   PT: 23.4 sec;   INR: 2.13 ratio    PTT - ( 02 Mar 2018 05:33 )  PTT:59.0 sec      03-02    143  |  108  |  41<H>  ----------------------------<  104<H>  4.6   |  23  |  1.10    Ca    8.1<L>      02 Mar 2018 03:34  Phos  2.3     03-02  Mg     2.8     03-02    TPro  5.8<L>  /  Alb  3.1<L>  /  TBili  0.6  /  DBili  x   /  AST  25  /  ALT  13  /  AlkPhos  58  03-02      ABG - ( 02 Mar 2018 08:05 )  pH: 7.44  /  pCO2: 37    /  pO2: 94    / HCO3: 25    / Base Excess: 1.1   /  SaO2: 98          Imaging and other studies:  VA Duplex Upper Ext Vein Scan, Bilat (02.26.18 @ 13:22) >  Right upper extremity:    There is edema within the superficial soft tissues of the right upper   extremity.    There is acute expansile echogenic thrombus of the right internal jugular   vein.    The right innominate and subclavian veins are also noncompressible.    The right axillary and brachial veins are compressible and patent.    The right cephalic vein is thrombosed in the proximal forearm.    The right basilic vein is patent and compressible.      Left upper extremity:    There is edema within the superficial soft tissues of the left upper   extremity.    The left internal jugular vein was not evaluated due to overlying   dressing.    There is lack of compressibility of the left subclavian vein.     The left axillary and brachial veins are patent and compressible.    The left basilic and cephalic veins are patent and compressible.      IMPRESSION:    Acute, occlusive DVT of the right internal jugular, innominate, and   subclavian veins.    Acute, occlusive DVT of the left subclavian vein.    Superficial thrombosis of the right cephalic vein.        VA Duplex Lower Ext Vein Scan, Bilat (02.28.18 @ 17:11) >  IMPRESSION:     No evidence of bilateral lower extremity deep venous thrombosis.    < end of copied text > Vascular Surgery Consult  [id number]    CC: 67y old Male admitted with a chief complaint of Elevated LDH levels (01 Feb 2018 19:36)  , now    HPI:  67M PMHx Afib, VT s/p AICD, heart failure s/p LVAD (6/2017) c/b pump thrombosis & GI bleeding, now s/p orthotopic heart transplant (2/23) c/b venous clot, possible acute graft rejection, & hypoxic respiratory failure.     POD3 UE duplex w/ findings of: Acute, occlusive DVT of the right internal jugular, innominate, and subclavian veins. Acute, occlusive DVT of the left subclavian vein. Superficial thrombosis of the right cephalic vein. Patient noted to have R middle finger discoloration.     Concern for HIT, heparin held & patient was started empirically on argatroban pending testing.  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+, serotonin release assay negative  3/2: argatroban d/c, started on therapeutic lovenox SQ      PMHx: GIB (gastrointestinal bleeding)  H/O prior ablation treatment  Ventricular fibrillation  PAF (paroxysmal atrial fibrillation)  Non-Ischemic Cardiomyopathy  SVT (Supraventricular Tachycardia)  HTN  CHF (Congestive Heart Failure)    PSHx: LVAD (left ventricular assist device) present  Status post left hip replacement  Hypertension  Hypertension  History of Prior Ablation Treatment  AICD (Automatic Cardioverter/Defibrillator) Present    Medications (inpatient): ALBUTerol/ipratropium for Nebulization 3 milliLiter(s) Nebulizer every 6 hours  docusate sodium 100 milliGRAM(s) Oral three times a day  enoxaparin Injectable 40 milliGRAM(s) SubCutaneous two times a day  insulin lispro (HumaLOG) corrective regimen sliding scale   SubCutaneous three times a day before meals  insulin lispro (HumaLOG) corrective regimen sliding scale   SubCutaneous at bedtime  meropenem  IVPB 1000 milliGRAM(s) IV Intermittent every 8 hours  mycophenolate mofetil 1000 milliGRAM(s) Oral <User Schedule>  nystatin    Suspension 794028 Unit(s) Oral every 6 hours  pantoprazole  Injectable 40 milliGRAM(s) IV Push two times a day  predniSONE   Tablet 20 milliGRAM(s) Oral every 12 hours  sodium chloride 0.9%. 1000 milliLiter(s) IV Continuous <Continuous>  tacrolimus 7 milliGRAM(s) Oral <User Schedule>  trimethoprim   80 mG/sulfamethoxazole 400 mG 1 Tablet(s) Oral daily  valGANciclovir 450 milliGRAM(s) Oral every 12 hours    Medications (PRN): x    Allergies: No Known Allergies  (Intolerances: )  Social Hx:     Physical Exam  T(C): 36.8 (03-02-18 @ 07:00)  HR: 87 (03-02-18 @ 08:35) (82 - 90)  BP: --  RR: 20 (03-02-18 @ 08:35) (0 - 25)  SpO2: 100% (03-02-18 @ 08:35) (87% - 100%)  Wt(kg): --  Tmax: T(C): , Max: 37.1 (03-01-18 @ 23:00)  Wt(kg): --    03-01-18  -  03-02-18  --------------------------------------------------------  IN:    argatroban Infusion: 148.8 mL    DOBUTamine Infusion: 16.3 mL    IV PiggyBack: 375 mL    Oral Fluid: 320 mL    sodium chloride 0.9%.: 170 mL  Total IN: 1030.1 mL    OUT:    Indwelling Catheter - Urethral: 1265 mL  Total OUT: 1265 mL    Total NET: -234.9 mL      03-02-18  -  03-02-18  --------------------------------------------------------  IN:    IV PiggyBack: 312.5 mL  Total IN: 312.5 mL    OUT:    Indwelling Catheter - Urethral: 40 mL  Total OUT: 40 mL    Total NET: 272.5 mL        General: well developed, well nourished, NAD  Neuro: alert and oriented x3; no focal deficits, moves all extremities spontaneously  HEENT: NCAT, EOMI, anicteric, mucosa moist  Respiratory: airway patent, respirations mildly labored on high flow NC  CVS: irregular  Abdomen: soft, nondistended  Extremities: b/l  LE edema, sensation and movement grossly intact  RUE: 3rd finger black & cold distal tip to DIP; palpable radial pulse, +doppler ulnar & palmar arch; swelling of wrist w/ serous fluid draining from puncture site  Skin: warm, dry, appropriate color    Labs:                        10.4   21.6  )-----------( 290      ( 02 Mar 2018 03:34 )             31.6     PT/INR - ( 02 Mar 2018 05:33 )   PT: 23.4 sec;   INR: 2.13 ratio    PTT - ( 02 Mar 2018 05:33 )  PTT:59.0 sec      03-02    143  |  108  |  41<H>  ----------------------------<  104<H>  4.6   |  23  |  1.10    Ca    8.1<L>      02 Mar 2018 03:34  Phos  2.3     03-02  Mg     2.8     03-02    TPro  5.8<L>  /  Alb  3.1<L>  /  TBili  0.6  /  DBili  x   /  AST  25  /  ALT  13  /  AlkPhos  58  03-02      ABG - ( 02 Mar 2018 08:05 )  pH: 7.44  /  pCO2: 37    /  pO2: 94    / HCO3: 25    / Base Excess: 1.1   /  SaO2: 98          Heparin Induced Platelet Antibody (02.27.18 @ 15:29)    Heparin-PF4 AB: Positive    Heparin-PF4 AB EIA: 0.548 ABS    %Heparin Inhibition: 100 %    Heparin-PF4 AB Interp: Interpretation:  The Specificity of the Heparin-PF4 Antibody EIA is confirmed by > 50%  inhibition, by excess Heparin, of reaction with OD > 0.4.  Equivocal  results, indicating < 50% inhibition, should be followed up with repeat  testing in 48-72 hours.        Serotonin Releasing Assay (02.27.18 @ 22:39)    Serotonin Releasing Assay:     Unfractionated Heparin Low Dose      0   %  Unfractionated Heparin High Dose     0  %    Unfractionated Heparin Interpretation:         NEGATIVE    A positive result requires release of > 20% of serotonin from target  platelets in the presence ofpatient serum and low dose 0.1 U/mL  heparin together with the inhibition of  release (to < 20%) with high  dose 100 U/mL  heparin. Results obtained with this patient's serum were negative. These  results argue against, but do not  completely rule out a diagnosis of Heparin-Induced Thrombocytopenia (HIT).    If not already performed and clinically indicated, considered repeat  testing of a fresh serum sample collected 24 - 48 hours after collection  of the current sample.    This test was developed and its performance characteristics validated by  the [facility]. It has not been cleared or approved by the  FDA. However, the FDA has determined that such clearance or approval  is  not necessary. The results are not intended to be used at the sole means  for clinical diagnosis or patient management decisions but should not be  regarded as investigational or for research. [facility] is  certified under the Clinical Laboratory Improvement Amendments (CLIA) as  qualified to perform high complexity laboratory testing.        Imaging and other studies:  VA Duplex Upper Ext Vein Scan, Bilat (02.26.18 @ 13:22) >  Right upper extremity:    There is edema within the superficial soft tissues of the right upper   extremity.    There is acute expansile echogenic thrombus of the right internal jugular   vein.    The right innominate and subclavian veins are also noncompressible.    The right axillary and brachial veins are compressible and patent.    The right cephalic vein is thrombosed in the proximal forearm.    The right basilic vein is patent and compressible.      Left upper extremity:    There is edema within the superficial soft tissues of the left upper   extremity.    The left internal jugular vein was not evaluated due to overlying   dressing.    There is lack of compressibility of the left subclavian vein.     The left axillary and brachial veins are patent and compressible.    The left basilic and cephalic veins are patent and compressible.      IMPRESSION:    Acute, occlusive DVT of the right internal jugular, innominate, and   subclavian veins.    Acute, occlusive DVT of the left subclavian vein.    Superficial thrombosis of the right cephalic vein.        VA Duplex Lower Ext Vein Scan, Bilat (02.28.18 @ 17:11) >  IMPRESSION:     No evidence of bilateral lower extremity deep venous thrombosis.    < end of copied text >

## 2018-03-02 NOTE — CHART NOTE - NSCHARTNOTEFT_GEN_A_CORE
Indication :RT lower lobe collapse  :    History: S/p Heart transplant      Xray Findings: Rt lower lobe collapse    Findings:  Bronchoscope inserted via mouth through oropharynx to trachea & bg after cetacaine spray to oropharynx  Airway evaluation revealed Sharp Bg. CHRISTINE and LLL evaluation revealed  clear. RUL, RML, RLL revealed clear no mucous plug noted.. . Bronchoscope then withdrawn no  bleeding noted from the airways . Bronch done by Dr Sutherland.    Specimens: Bal send for Cult.

## 2018-03-02 NOTE — PHYSICAL THERAPY INITIAL EVALUATION ADULT - GENERAL OBSERVATIONS, REHAB EVAL
Pt received sitting in recliner with +a-line, +tele, +BP cuff, +pulse ox, +O2 via NC, +diaz, +ext pacer and NAD noted.
Pt encountered sitting in chair next to bed. + PIV,. AO x3 moving ad john.

## 2018-03-02 NOTE — PHYSICAL THERAPY INITIAL EVALUATION ADULT - IMPAIRMENTS FOUND, PT EVAL
aerobic capacity/endurance/muscle strength/gait, locomotion, and balance aerobic capacity/endurance/gait, locomotion, and balance/integumentary integrity/muscle strength

## 2018-03-02 NOTE — PHYSICAL THERAPY INITIAL EVALUATION ADULT - PLANNED THERAPY INTERVENTIONS, PT EVAL
balance training/bed mobility training/strengthening/transfer training/gait training gait training/balance training/bed mobility training/strengthening/wound care/transfer training

## 2018-03-02 NOTE — PHYSICAL THERAPY INITIAL EVALUATION ADULT - MANUAL MUSCLE TESTING RESULTS, REHAB EVAL
grossly 3/5 t/o extremities/grossly assessed due to
Strength is grossly at least 3/5 throughout/grossly assessed due to

## 2018-03-02 NOTE — PROGRESS NOTE ADULT - SUBJECTIVE AND OBJECTIVE BOX
BILLY RUSSELL   MRN#: [id number]     The patient is a 67y Male with PMH of end stage heart failure s/p Heart Mate II LVAD June 2017 complicated by pump thrombosis and recurrent GI bleed, s/p orthotopic heart transplant 2/23 complicated by clotting of his right IJ, innominate and bilateral subclavian veins as well as possible hyperacute graft dysfunction 2/23 who was seen, evaluated, & examined with the CTICU staff on rounds and later in the evening with a multidisciplinary care plan formulated & implemented.  All available clinical, laboratory, radiographic, pharmacologic, and electrocardiographic data were reviewed & analyzed.      The patient was in the CTICU in critical condition secondary to persistent cardiopulmonary dysfunction, persistent cardiovascular dysfunction, and delirium, S/P heart transplant.     Respiratory status required supplemental oxygen, the following of ABG’s with A-line monitoring, continuous pulse oximetry monitoring, mobilization and assistance with pulmonary toilet s/p bronchoscopy today for support & to evaluate for & prevent further decompensation secondary to persistent cardiopulmonary dysfunction and right lower lobe collapse.    Invasive hemodynamic monitoring with a central venous and arterial catheter were required for the following of continuous central venous and MAP/BP monitoring and close monitoring of fluid balance to ensure adequate cardiovascular support and to evaluate for & help prevent decompensation while receiving cautious volume resuscitation due to hypotension and intravascular volume depletion mild right ventricular dysfunction-S/P heart transplant with grade 1 rejection on initial biopsy. Immunosuppressive management as per heart failure/transplant team.    Patient has developed delirium and agitation requiring IV Dexmedetomidine with close monitoring of HR and blood pressure due to risk of bradycardia and hypotension.    Argatroban discontinued due to HIT positive but CHERIE negative result. Twice daily therapeutic lovenox for extensive venous clotting in IJ, subclavian and cephalic veins.    Metabolic stability, stress hyperglycemia, & infection prophylaxis required an IV regular Insulin sliding scale & the following of serial glucose levels to help achieve & maintain euglycemia.      Patient required more than the usual postoperative critical care management and I provided 50 minutes of non-continuous care to the patient.  Discussed at length with the CTICU staff and helped coordinate care.

## 2018-03-02 NOTE — PHYSICAL THERAPY INITIAL EVALUATION ADULT - ADDITIONAL COMMENTS
67 year old male who PTA was living in a  with brother, 3 steps to enter and 2nd floor bedroom., PTA pt was idependent in all functional mobility able to ambulate with a cane and walker. brother helps out from time to time, however pt reports going to the senior center to stay active and taking care of all his needs.
Xray Chest: Lines and tubes as above.No pneumothorax. Minimal left basilar linear subsegmental atelectasis. Transcutaneous pacing pad projects over the right hemithorax.The heart is stable in size. There are no focal pulmonary consolidations or pneumothorax.Stable bilateral chest tubes and mediastinal drains.Endotracheal tube in place with tip above the bg.Unchanged left IJ approach introducer sheath and central venous catheter.Enteric tube courses below the diaphragm with the side port of the level of the gastroesophageal junction; advancement is advised. S/p sternotomy. Trace left pleural fluid and subsegmental atelectasis at the left lung base. US Renal: Stable left renal cyst. TTE: 1. Left atrial enlargement. Normal left ventricular internal dimensions and wall thicknesses. Normal left ventricular systolic function. Septal motion is consistent with BBB. Decreased right ventricular systolic function. s/p Cardiac transplant  02/23/2018  LVAD explantation, AICD removal

## 2018-03-02 NOTE — PROGRESS NOTE ADULT - SUBJECTIVE AND OBJECTIVE BOX
PAGER:  [phone]               [facility] [address]              EMAIL [email or web address]   OFFICE [phone]                              ********VASCULAR MEDICINE & CARDIOLOGY PROGRESS NOTE********                        CC:  UE DVT    INTERVAL HISTORY:  S/P biopsy yesterday.  CHERIE negative.  No CP.  R arm swelling stable.  Seen by vascular surgery for evaluation of R 3rd digit ischemia.  Plan for bronch today      HISTORY OF PRESENT ILLNESS:  HPI:  67M PMH Chronic Systolic Heart Failure (ACC/AHA Stage D) due to NICMP s/p LVAD 6/12/17, GIB s/p Cautery (7/25/2017), known AV Malformations, Chronic Anemia due to numerous GIBs (off AC), A-Fib, VT s/p AICD. Pt admitted due to elevated LDH level of 646 for further evaluation and observation.  . Pt denies any chest pain, SOB, palpitations, N/D/V, abdominal pain, headaches, changes in vision, dizziness, pain or burning while urinating, swelling, numbness/tingling anywhere, rashes, fever, or recent travel. (01 Feb 2018 19:36)          Allergies    No Known Allergies    Intolerances    	    MEDICATIONS:  enoxaparin Injectable 70 milliGRAM(s) SubCutaneous two times a day    meropenem  IVPB 1000 milliGRAM(s) IV Intermittent every 8 hours  nystatin    Suspension 844830 Unit(s) Oral every 6 hours  trimethoprim   80 mG/sulfamethoxazole 400 mG 1 Tablet(s) Oral daily  valGANciclovir 450 milliGRAM(s) Oral every 12 hours    ALBUTerol/ipratropium for Nebulization 3 milliLiter(s) Nebulizer every 6 hours    dexmedetomidine Infusion 0.2 MICROgram(s)/kG/Hr IV Continuous <Continuous>    docusate sodium 100 milliGRAM(s) Oral three times a day  pantoprazole  Injectable 40 milliGRAM(s) IV Push two times a day    insulin lispro (HumaLOG) corrective regimen sliding scale   SubCutaneous three times a day before meals  insulin lispro (HumaLOG) corrective regimen sliding scale   SubCutaneous at bedtime  predniSONE   Tablet 20 milliGRAM(s) Oral every 12 hours    mycophenolate mofetil 1000 milliGRAM(s) Oral <User Schedule>  sodium chloride 0.9%. 1000 milliLiter(s) IV Continuous <Continuous>  tacrolimus 7 milliGRAM(s) Oral <User Schedule>      PAST MEDICAL & SURGICAL HISTORY:  GIB (gastrointestinal bleeding)  Ventricular fibrillation: s/p AICD  PAF (paroxysmal atrial fibrillation): on xarelto  Non-Ischemic Cardiomyopathy  SVT (Supraventricular Tachycardia)  HTN  CHF (Congestive Heart Failure)  LVAD (left ventricular assist device) present  Status post left hip replacement  History of Prior Ablation Treatment: for afib  AICD (Automatic Cardioverter/Defibrillator) Present: [employer] with 1 [employer] lead4/1/09- explanted and replaced with [employer] 2 leads on 9/2/09      FAMILY HISTORY:  No pertinent family history in first degree relatives      SOCIAL HISTORY:  unchanged    REVIEW OF SYSTEMS:  CONSTITUTIONAL: No fever, weight loss, or fatigue  EYES: No eye pain, visual disturbances, or discharge  ENMT:  No difficulty hearing, tinnitus, vertigo; No sinus or throat pain  NECK: No pain or stiffness  RESPIRATORY: No cough, wheezing, chills or hemoptysis; +Shortness of Breath  CARDIOVASCULAR: No chest pain, palpitations, passing out, dizziness, or leg swelling  GASTROINTESTINAL: No abdominal or epigastric pain. No nausea, vomiting, or hematemesis; No diarrhea or constipation. No melena or hematochezia.  GENITOURINARY: No dysuria, frequency, hematuria, or incontinence  NEUROLOGICAL: No headaches, memory loss, loss of strength, numbness, or tremors  SKIN: No itching, burning, rashes, or lesions   LYMPH Nodes: No enlarged glands  ENDOCRINE: No heat or cold intolerance; No hair loss  MUSCULOSKELETAL: No joint pain or swelling; No muscle, back, or extremity pain  PSYCHIATRIC: No depression, anxiety, mood swings, or difficulty sleeping  HEME/LYMPH: No easy bruising, or bleeding gums  ALLERY AND IMMUNOLOGIC: No hives or eczema	    [x ] All others negative	  [ ] Unable to obtain    PHYSICAL EXAM:  T(C): 36.7 (03-02-18 @ 11:00), Max: 37.1 (03-01-18 @ 23:00)  HR: 81 (03-02-18 @ 11:00) (80 - 90)  BP: --  RR: 6 (03-02-18 @ 11:00) (0 - 26)  SpO2: 100% (03-02-18 @ 11:00) (87% - 100%)  Wt(kg): --  I&O's Summary    01 Mar 2018 07:01  -  02 Mar 2018 07:00  --------------------------------------------------------  IN: 1030.1 mL / OUT: 1265 mL / NET: -234.9 mL    02 Mar 2018 07:01  -  02 Mar 2018 11:52  --------------------------------------------------------  IN: 402.7 mL / OUT: 170 mL / NET: 232.7 mL        Appearance: Normal, on high flow oxgyen  HEENT:   Normal oral mucosa, PERRL, EOMI	  Lymphatic: No lymphadenopathy  Cardiovascular: Normal S1 S2 tachycardia.   Respiratory: clear	  Psychiatry: A & O x 3, Mood & affect appropriate  Gastrointestinal:  Soft, Non-tender, + BS	  Skin: No rashes, No ecchymoses, No cyanosis	  Neurologic: Non-focal  Extremities: RUE edema.  3rd digit R hand ischemia, motion and sensation intact.       LABS:	 	    CBC Full  -  ( 02 Mar 2018 03:34 )  WBC Count : 21.6 K/uL  Hemoglobin : 10.4 g/dL  Hematocrit : 31.6 %  Platelet Count - Automated : 290 K/uL  Mean Cell Volume : 95.8 fl  Mean Cell Hemoglobin : 31.7 pg  Mean Cell Hemoglobin Concentration : 33.1 gm/dL  Auto Neutrophil # : x  Auto Lymphocyte # : x  Auto Monocyte # : x  Auto Eosinophil # : x  Auto Basophil # : x  Auto Neutrophil % : x  Auto Lymphocyte % : x  Auto Monocyte % : x  Auto Eosinophil % : x  Auto Basophil % : x    03-02    143  |  107  |  38<H>  ----------------------------<  102<H>  4.0   |  24  |  1.06  03-02    143  |  108  |  41<H>  ----------------------------<  104<H>  4.6   |  23  |  1.10    Ca    7.9<L>      02 Mar 2018 11:03  Ca    8.1<L>      02 Mar 2018 03:34  Phos  3.2     03-02  Phos  2.3     03-02  Mg     2.8     03-02    TPro  5.8<L>  /  Alb  3.3  /  TBili  0.6  /  DBili  x   /  AST  18  /  ALT  11  /  AlkPhos  57  03-02  TPro  5.8<L>  /  Alb  3.1<L>  /  TBili  0.6  /  DBili  x   /  AST  25  /  ALT  13  /  AlkPhos  58  03-02

## 2018-03-02 NOTE — CONSULT NOTE ADULT - ASSESSMENT
67M s/p heart transplant h/o b/l UE VTE, now w/ ischemic R 3rd finger.    -no acute Vascular Surgery intervention indicated at this time  -obtain b/l Upper extremity arterial duplex  -agree w/ continued full anticoagulation    -will continue to follow    (Discussed w/ Vascular Fellow Dr. MARISSA Elizalde)        BETHANY Dueñas MD (PGY2)    (Please page Vascular [phone] for questions/concerns.)

## 2018-03-02 NOTE — PHYSICAL THERAPY INITIAL EVALUATION ADULT - CRITERIA FOR SKILLED THERAPEUTIC INTERVENTIONS
impairments found
risk reduction/prevention/rehab potential/impairments found/therapy frequency/anticipated equipment needs at discharge/predicted duration of therapy intervention/anticipated discharge recommendation/functional limitations in following categories

## 2018-03-02 NOTE — PROGRESS NOTE ADULT - SUBJECTIVE AND OBJECTIVE BOX
Behavioral Cardiology Progress Note     HPI:  Mr. 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 Was found to have b/l IJ/subclavian thrombi. Extubated 2/27.     Behavioral Health Assessment:   Current stressors:   Hospitalization   s/p Heart transplant (2/23/18)      Support system/family support: Good support from family and close friend      Coping strategies: Talking with family and staff, watching TV, his pastora, talking to his granddaughter     Understanding of medical illness and treatment plan:  Understands reasons for this admission and treatment plan.  Good understanding of heart transplant education including need for lifelong immunosuppressant medication.  Benefits from frequent review and teach-back of all education.     MSE:  Awake and alert, oriented x3. Well related with good eye contact. Thought process goal directed. No abnormal thought content; denies s/i.  Mood upbeat.  Affect full range.  Insight and judgment adequate.

## 2018-03-02 NOTE — PROGRESS NOTE ADULT - ASSESSMENT
Mr. 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 Underwent biopsy which showed CMR 1R, AMR 1 (no cellular injury). Repeat DSA negative.     Immunosuppression prophylaxis:  Tacrolimus - started on prograf 2/25; received 7 mg last evening at 20:00; tac level 14; reduce to 5 mg q12; please check levels at 7:30 am (stat)  Continue CellCept 1000 mg PO BID  Steroids - continue taper; on prednisone 20 mg PO q12h; reduce to 15 mg q12 tomorrow which will continue until next week   	  Antimicrobial prophylaxis:  Intermediate risk CMV - on oral valganciclovir 450 mg q12  Intermediate risk Toxo - on Bactrim PPx  PCP - on Bactrim 1 SS tab daily     Graft function:  LV function improved; persistent mild RV dysfunction however excellent HD off inotropes  underwent biospy 3/1; positive for grade 1R/AMR 1; DSA negative    Hypoxia:  likely d/t atelectasis; improving; s/p bronch with no mucous plug    Other prophylaxis:  Protonix 40 mg PO daily for GI prophylaxis while on steroids.    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

## 2018-03-02 NOTE — PHYSICAL THERAPY INITIAL EVALUATION ADULT - PERTINENT HX OF CURRENT PROBLEM, REHAB EVAL
Pt is a 67 y.o. male with PMH Chronic Systolic Heart Failure (ACC/AHA Stage D) due to NICMP s/p LVAD 6/12/17, GIB s/p Cautery (7/25/2017), known AV Malformations, Chronic Anemia due to numerous GIBs (off AC), A-Fib, VT s/p AICD. Pt admitted due to elevated LDH level of 646 for further evaluation and observation.
67M PMH Chronic Systolic Heart Failure (ACC/AHA Stage D) due to NICMP s/p LVAD 6/12/17, GIB s/p Cautery (7/25/2017), known AV Malformations, Chronic Anemia due to numerous GIBs (off AC), A-Fib, VT s/p AICD. Pt admitted due to elevated LDH level of 646 for further evaluation and observation.

## 2018-03-02 NOTE — PHYSICAL THERAPY INITIAL EVALUATION ADULT - IMPAIRMENTS CONTRIBUTING TO GAIT DEVIATIONS, PT EVAL
impaired balance/decreased flexibility/decreased strength/narrow base of support
impaired balance/decreased strength

## 2018-03-02 NOTE — CHART NOTE - NSCHARTNOTEFT_GEN_A_CORE
Source: Patient [ x ]    Family [ ]     other [ x ] RN, NP, Comprehensive chart review     Pt seen for nutrition follow up. Reports good appetite and feeling hungry. Pt awaiting lunch, was NPO this morning for bronchoscopy. Pt not interested in reviewing medical nutrition therapy at this time as he is feeling extremely hungry. Denies nausea/vomiting, but reports loose BM today. Noted with BM x2 thus far today.     Pt seen for nutrition follow up. 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 Now s/p first endomyocardial biopsy- preliminary results consistent with AMR but possibly low grade. Noted per ID pt with right lower lobe atelectasis vs early PNA.    Diet : Dysphagia 3 with honey thickened liquids    Per d/w team, pt placed on modified diet/liquid consistency for aspiration precautions.     Admission Weight: 78.9kg  Current Weight: 72.6kg  Weight fluctuations noted, likely due to fluid shifts. Pt with 2+ generalized and 3+right hand edema.     Pertinent Medications: MEDICATIONS  (STANDING):  albumin human  5% IVPB 250 milliLiter(s) IV Intermittent once  ALBUTerol/ipratropium for Nebulization 3 milliLiter(s) Nebulizer every 6 hours  calcium gluconate IVPB 1 Gram(s) IV Intermittent once  dexmedetomidine Infusion 0.2 MICROgram(s)/kG/Hr (3.885 mL/Hr) IV Continuous <Continuous>  docusate sodium 100 milliGRAM(s) Oral three times a day  enoxaparin Injectable 70 milliGRAM(s) SubCutaneous two times a day  insulin lispro (HumaLOG) corrective regimen sliding scale   SubCutaneous three times a day before meals  insulin lispro (HumaLOG) corrective regimen sliding scale   SubCutaneous at bedtime  meropenem  IVPB 1000 milliGRAM(s) IV Intermittent every 8 hours  mycophenolate mofetil 1000 milliGRAM(s) Oral <User Schedule>  nystatin    Suspension 060425 Unit(s) Oral every 6 hours  pantoprazole  Injectable 40 milliGRAM(s) IV Push two times a day  predniSONE   Tablet 20 milliGRAM(s) Oral every 12 hours  sodium chloride 0.9%. 1000 milliLiter(s) (10 mL/Hr) IV Continuous <Continuous>  tacrolimus 7 milliGRAM(s) Oral <User Schedule>  trimethoprim   80 mG/sulfamethoxazole 400 mG 1 Tablet(s) Oral daily  valGANciclovir 450 milliGRAM(s) Oral every 12 hours    MEDICATIONS  (PRN):    Pertinent Labs:    Complete Blood Count (03.02.18 @ 03:34)    Hemoglobin: 10.4 g/dL - low    Hematocrit: 31.6 % - low  Comprehensive Metabolic Panel (03.02.18 @ 11:03)    Sodium, Serum: 143 mmol/L    Potassium, Serum: 4.0 mmol/L    Chloride, Serum: 107 mmol/L    Carbon Dioxide, Serum: 24 mmol/L    Anion Gap, Serum: 12 mmol/L    Blood Urea Nitrogen, Serum: 38 mg/dL - high    Creatinine, Serum: 1.06 mg/dL    Glucose, Serum: 102 mg/dL - high    Calcium, Total Serum: 7.9 mg/dL - low    Protein Total, Serum: 5.8 g/dL - low    Albumin, Serum: 3.3 g/dL    Bilirubin Total, Serum: 0.6 mg/dL    Alkaline Phosphatase, Serum: 57 U/L    Aspartate Aminotransferase (AST/SGOT): 18 U/L    Alanine Aminotransferase (ALT/SGPT): 11 U/L RC  Phosphorus Level, Serum (03.02.18 @ 11:03)    Phosphorus Level, Serum: 3.2 mg/dL - low  Lactate Dehydrogenase, Serum (03.02.18 @ 01:03)    Lactate Dehydrogenase, Serum: 409 U/L - high    Point of Care Testing BG: (3/1)85-98    Skin: No pressure ulcers noted.     Estimated Needs:   [ x ] no change since previous assessment  [ ] recalculated:       Previous Nutrition Diagnosis:   Limited adherence to nutrition related recommendations ongoing.  Inadequate oral intake and Increased Nutrient Needs ongoing, addressed with diet advancement.          New Nutrition Diagnosis: [ x ] not applicable      Interventions:   1. Recommend Ensure Enlive 8 ounces 2 times/day to promote PO intake (thicken to appropriate consistency as needed.  2. Provide food preferences within therapeutic diet when requested.   3. Encourage use of alternate menu options as needed.   4. Provided pt with Nutrition Guidelines after Heart Transplant, will review with pt when he is ready.     D/w team.       Monitoring and Evaluation:     [ x ] PO intake [ x ] Tolerance to diet prescription [ x ] weights [ x ] follow up per protocol    [ ] other:

## 2018-03-02 NOTE — PROGRESS NOTE ADULT - SUBJECTIVE AND OBJECTIVE BOX
Interval History:  doing well  worsening RLL atelectasis; s/p bronch w/o any mucous plug  had L femoral line removed  drainage from old driveline site    Medications:  ALBUTerol/ipratropium for Nebulization 3 milliLiter(s) Nebulizer every 6 hours  dexmedetomidine Infusion 0.2 MICROgram(s)/kG/Hr IV Continuous <Continuous>  docusate sodium 100 milliGRAM(s) Oral three times a day  enoxaparin Injectable 70 milliGRAM(s) SubCutaneous two times a day  insulin lispro (HumaLOG) corrective regimen sliding scale   SubCutaneous three times a day before meals  insulin lispro (HumaLOG) corrective regimen sliding scale   SubCutaneous at bedtime  meropenem  IVPB 1000 milliGRAM(s) IV Intermittent every 8 hours  mycophenolate mofetil 1000 milliGRAM(s) Oral <User Schedule>  nystatin    Suspension 965873 Unit(s) Oral every 6 hours  pantoprazole  Injectable 40 milliGRAM(s) IV Push two times a day  sodium chloride 0.9%. 1000 milliLiter(s) IV Continuous <Continuous>  tacrolimus 6 milliGRAM(s) Oral every 12 hours  trimethoprim   80 mG/sulfamethoxazole 400 mG 1 Tablet(s) Oral daily  valGANciclovir 450 milliGRAM(s) Oral every 12 hours    Vitals:  T(C): 36.7 (18 @ 19:00), Max: 37.1 (18 @ 23:00)  HR: 92 (18 @ 20:00) (80 - 105)  ABP: 107/60 (18 @ 20:00) (90/51 - 148/82)  ABP(mean): 74 (18 @ 20:00) (64 - 105)  RR: 11 (18 @ 20:00) (8 - 26)  SpO2: 100% (18 @ 20:00) (96% - 100%)    Daily     Daily Weight in k.6 (02 Mar 2018 01:00)    I&O's Summary    01 Mar 2018 07:01  -  02 Mar 2018 07:00  --------------------------------------------------------  IN: 1030.1 mL / OUT: 1265 mL / NET: -234.9 mL    02 Mar 2018 07:01  -  02 Mar 2018 20:12  --------------------------------------------------------  IN: 1259.9 mL / OUT: 415 mL / NET: 844.9 mL    Physical Exam:  Appearance: No Acute Distress  HEENT: No JVD. Facial swelling  Cardiovascular: Normal S1 S2, No murmurs/rubs/gallops  Respiratory: Clear to auscultation bilaterally  Gastrointestinal: Soft, Non-tender	  Skin: No cyanosis. Driveline site with drainage	  Neurologic: Non-focal  Extremities: No LE edema  Psychiatry: A & O x 3, Mood & affect appropriate    Labs:                        10.4   21.6  )-----------( 290      ( 02 Mar 2018 03:34 )             31.6         143  |  107  |  38<H>  ----------------------------<  102<H>  4.0   |  24  |  1.06    Ca    7.9<L>      02 Mar 2018 11:03  Phos  3.2       Mg     2.8         TPro  5.8<L>  /  Alb  3.3  /  TBili  0.6  /  DBili  x   /  AST  18  /  ALT  11  /  AlkPhos  57      PT/INR - ( 02 Mar 2018 15:06 )   PT: 13.8 sec;   INR: 1.26 ratio         PTT - ( 02 Mar 2018 15:06 )  PTT:34.8 sec      Serum Pro-Brain Natriuretic Peptide: 8160 pg/mL ( @ 03:34)  Serum Pro-Brain Natriuretic Peptide: 3895 pg/mL ( @ 02:49)  Serum Pro-Brain Natriuretic Peptide: 9103 pg/mL ( @ 02:14)    Oxygen Saturation, Mixed: 65 % ( @ 09:10)    Lactate Dehydrogenase, Serum: 409 U/L ( @ 01:03)

## 2018-03-02 NOTE — PROGRESS NOTE ADULT - ASSESSMENT
In good spirits.  Feeling well physically, denies pain or discomfort.  Slept well last night.  Reviewed immunosuppressant medication with patient who was able to repeat back the names of Prednisone Prograf, and Cellcept.  Understands he needs to be on immunosuppressant medications for life and was able to teach back the importance of never missing a dose. Benefits from frequent review.  Members of his support system have been visiting.  Receptive to support and validation.   DX:  Systolic heart failure; r/o Adjustment disorder     Recommendations:   Benefits from frequent review of HT education with teach-back  Behavioral Cardiology will follow as needed

## 2018-03-02 NOTE — PROGRESS NOTE ADULT - SUBJECTIVE AND OBJECTIVE BOX
CRITICAL CARE ATTENDING - CTICU    MEDICATIONS  (STANDING):  ALBUTerol/ipratropium for Nebulization 3 milliLiter(s) Nebulizer every 6 hours  dexmedetomidine Infusion 0.2 MICROgram(s)/kG/Hr (3.885 mL/Hr) IV Continuous <Continuous>  docusate sodium 100 milliGRAM(s) Oral three times a day  enoxaparin Injectable 70 milliGRAM(s) SubCutaneous two times a day  insulin lispro (HumaLOG) corrective regimen sliding scale   SubCutaneous three times a day before meals  insulin lispro (HumaLOG) corrective regimen sliding scale   SubCutaneous at bedtime  meropenem  IVPB 1000 milliGRAM(s) IV Intermittent every 8 hours  mycophenolate mofetil 1000 milliGRAM(s) Oral <User Schedule>  nystatin    Suspension 610200 Unit(s) Oral every 6 hours  pantoprazole  Injectable 40 milliGRAM(s) IV Push two times a day  predniSONE   Tablet 20 milliGRAM(s) Oral every 12 hours  sodium chloride 0.9%. 1000 milliLiter(s) (10 mL/Hr) IV Continuous <Continuous>  tacrolimus 7 milliGRAM(s) Oral <User Schedule>  trimethoprim   80 mG/sulfamethoxazole 400 mG 1 Tablet(s) Oral daily  valGANciclovir 450 milliGRAM(s) Oral every 12 hours                                    10.4   21.6  )-----------( 290      ( 02 Mar 2018 03:34 )             31.6       03-02    143  |  108  |  41<H>  ----------------------------<  104<H>  4.6   |  23  |  1.10    Ca    8.1<L>      02 Mar 2018 03:34  Phos  2.3     03-02  Mg     2.8     -    TPro  5.8<L>  /  Alb  3.1<L>  /  TBili  0.6  /  DBili  x   /  AST  25  /  ALT  13  /  AlkPhos  58  03-02      PT/INR - ( 02 Mar 2018 05:33 )   PT: 23.4 sec;   INR: 2.13 ratio         PTT - ( 02 Mar 2018 05:33 )  PTT:59.0 sec    Mode: HFNC  FiO2: 50      Daily     Daily Weight in k.6 (02 Mar 2018 01:00)      03- @ 07:01  -   @ 07:00  --------------------------------------------------------  IN: 1030.1 mL / OUT: 1265 mL / NET: -234.9 mL     @ 07:01   @ 11:18  --------------------------------------------------------  IN: 402.7 mL / OUT: 170 mL / NET: 232.7 mL        Critically Ill patient  : [ ] preoperative ,   [ ] post operative    Requires :  [ ] Arterial Line   [ ] Central Line  [ ] PA catheter  [ ] IABP  [ ] ECMO  [ ] LVAD  [ ] Ventilator  [ ] pacemaker [ ] Impella.    Bedside evaluation , monitoring , treatment of hemodynamics , fluids , IVP/ IVCD meds.        Diagnosis:     POD 2: Heart Transplant    Cardiogenic Shock - resolved    CHF- acute [x ]   chronic [ ]    systolic [ ]   diatolic [ ]          - Echo- EF -             [ x] RV dysfunction          - Cxr-cardiomegally, edema          - Clinical-  [x ]inotropes   [ ]pressors   [x ]diuresis   [ ]IABP   [ ]ECMO   [ ]LVAD   [ ]Respiratory Failure    Acute graft Dysfunction    s/p myocardial biopsy    (R) atelectasis - BIPAP / HF o2 - r/o pneumonia antibiotics added    Requires chest PT, pulmonary toilet, suctioning to maintain SaO2,  patent airway and treat atelectasis.     Bronchoscopy today    Hypoxemia    fluid overload     Renal Failure - resolved    Requires bedside physical therapy, mobilization and total residential care.     Hemodynamic lability,instability. Requires IVCD [ ] vasopressors [x ] inotropes  on-off   [ ] vasodilator  [ ]IVSS fluid  to maintain MAP, perfusion, C.I.                             -                     Discussed with CT surgeon, Physician's Assistant - Nurse Practitioner- Critical care medicine team.   Dicussed at  AM / PM rounds.   Chart, labs , films reviewed.    Total Time: 120 min.

## 2018-03-02 NOTE — PROGRESS NOTE ADULT - ASSESSMENT
68 yo man s/p prior LVAD placement, no infections related to the LVAD prior to undergoing an orthotopic heart transplant 2/23 from a reportedly high risk donor HCV. Patient on broad federica-operative prophylaxis to Vancomycin/Meropenem/Fluconazole based upon his prior LVAD placement and prolonged hospitalization pre-transplant. Intermediate risk for CMV (seropositive); intermediate risk for toxoplasmosis (seropositive R). Improved aeration today at right lung base. Significant amount of sero-sanguinous drainage from old LVAD exit site.    - Plan:  Continue OI prophylaxis with Valganciclovir, Bactrim, Nystatin  Continue Meropenem 1 gram q 8hr  Would add Vancomycin 1 gram q 12hr to increase coverage of skin jose based upon exit site drainage  Hep C viral load in the HCV donor is pending- the donor genotype is 1a  HCV viral load and genotype in patient/recipient was sent today- patient will need therapy for HCV as an out patient  Blood cultures are NGTD and patient in need of IV access: risk benefit suggests need for midline    Gary Lujan MD  [phone]  After 5pm/weekends [phone] 66 yo man s/p prior LVAD placement, no infections related to the LVAD prior to undergoing an orthotopic heart transplant 2/23 from a reportedly high risk donor HCV. Patient on broad federica-operative prophylaxis to Vancomycin/Meropenem/Fluconazole based upon his prior LVAD placement and prolonged hospitalization pre-transplant. Intermediate risk for CMV (seropositive); intermediate risk for toxoplasmosis (seropositive R). Improved aeration today at right lung base. Significant amount of sero-sanguinous drainage from old LVAD exit site.    - Plan:  Continue OI prophylaxis with Valganciclovir, Bactrim, Nystatin  Continue Meropenem 1 gram q 8hr  Would add Vancomycin 1 gram q 12hr to increase coverage of skin jose based upon exit site drainage  He will need a Vanco trough prior to the fourth dose  Hep C viral load in the HCV donor is pending- the donor genotype is 1a  HCV viral load and genotype in patient/recipient was sent today- patient will need therapy for HCV as an out patient  Blood cultures are NGTD and patient in need of IV access: risk benefit suggests need for midline    Gary Lujan MD  [phone]  After 5pm/weekends [phone] 68 yo man s/p prior LVAD placement, no infections related to the LVAD prior to undergoing an orthotopic heart transplant 2/23 from a reportedly high risk donor HCV. Patient on broad federica-operative prophylaxis to Vancomycin/Meropenem/Fluconazole based upon his prior LVAD placement and prolonged hospitalization pre-transplant. Intermediate risk for CMV (seropositive); intermediate risk for toxoplasmosis (seropositive R). Improved aeration today at right lung base. Significant amount of sero-sanguinous drainage from old LVAD exit site.    - Plan:  Continue OI prophylaxis with Valganciclovir, Bactrim, Nystatin  Continue Meropenem 1 gram q 8hr  Would add Vancomycin 1 gram q 12hr to increase coverage of skin jose based upon exit site drainage  He will need a Vanco trough prior to the fourth dose  Hep C viral load in the HCV donor is pending- the donor genotype is 1a  HCV viral load and genotype in patient/recipient was sent today- patient will need therapy for HCV as an out patient  Blood cultures are NGTD and patient in need of IV access: risk benefit suggests need for midline      Patient in need of a midline catheter as he has extremely limited access because of clot - no contraindication to placing midline     Gary Lujan MD  [phone]  After 5pm/weekends [phone] 68 yo man s/p prior LVAD placement, no infections related to the LVAD prior to undergoing an orthotopic heart transplant 2/23 from a reportedly high risk donor HCV. Patient on broad federica-operative prophylaxis to Vancomycin/Meropenem/Fluconazole based upon his prior LVAD placement and prolonged hospitalization pre-transplant. Intermediate risk for CMV (seropositive); intermediate risk for toxoplasmosis (seropositive R). Improved aeration today at right lung base. Significant amount of sero-sanguinous drainage from old LVAD exit site.    - Plan:  Continue OI prophylaxis with Valganciclovir, Bactrim, Nystatin  Continue Meropenem 1 gram q 8hr  Would add Vancomycin 1 gram q 12hr to increase coverage of skin jose based upon exit site drainage  He will need a Vanco trough prior to the fourth dose  Hep C viral load in the HCV donor is pending- the donor genotype is 1a  HCV viral load and genotype in patient/recipient was sent today- patient will need therapy for HCV as an out patient  Blood cultures are NGTD and patient in need of IV access: risk benefit suggests need for midline      Patient in need of a midline catheter as he has extremely limited access because of clot - no contraindication to placing midline  I am aware that his 3am blood culture is pending but he needs the midline access- please place    Gary Lujan MD  [phone]  After 5pm/weekends [phone]

## 2018-03-02 NOTE — PROGRESS NOTE ADULT - ATTENDING COMMENTS
resume argatraban without bolus if bleeding risk acceptable post biopsy  Platelets uptrending - followup seratonin release assay  If displays stability can consider fondaparinux, but would keep argatraban for now.       Daysi  [id number]

## 2018-03-02 NOTE — PROGRESS NOTE ADULT - SUBJECTIVE AND OBJECTIVE BOX
INFECTIOUS DISEASES FOLLOW UP-- Sujey Lujan  [phone]    This is a follow up note for this  67yMale with  Heart replaced by OHTx on 2/23/18.  Patient is extubated on hi-flow oxygen and was OOB standing today. ID issue of increasing leukocytosis      ROS:  CONSTITUTIONAL:  No fever,  CARDIOVASCULAR:  No chest pain or palpitations  RESPIRATORY:  mild dyspnea  GASTROINTESTINAL:  No nausea, vomiting, diarrhea, or abdominal pain  GENITOURINARY:  No dysuria  NEUROLOGIC:  No headache,     Allergies    No Known Allergies    Intolerances        ANTIBIOTICS/RELEVANT:  antimicrobials  meropenem  IVPB 1000 milliGRAM(s) IV Intermittent every 8 hours  nystatin    Suspension [id number] Unit(s) Oral every 6 hours  trimethoprim   80 mG/sulfamethoxazole 400 mG 1 Tablet(s) Oral daily  valGANciclovir 450 milliGRAM(s) Oral every 12 hours    immunologic:  mycophenolate mofetil 1000 milliGRAM(s) Oral <User Schedule>  tacrolimus 7 milliGRAM(s) Oral <User Schedule>    OTHER:  ALBUTerol/ipratropium for Nebulization 3 milliLiter(s) Nebulizer every 6 hours  dexmedetomidine Infusion 0.2 MICROgram(s)/kG/Hr IV Continuous <Continuous>  docusate sodium 100 milliGRAM(s) Oral three times a day  enoxaparin Injectable 70 milliGRAM(s) SubCutaneous two times a day  insulin lispro (HumaLOG) corrective regimen sliding scale   SubCutaneous three times a day before meals  insulin lispro (HumaLOG) corrective regimen sliding scale   SubCutaneous at bedtime  pantoprazole  Injectable 40 milliGRAM(s) IV Push two times a day  predniSONE   Tablet 20 milliGRAM(s) Oral every 12 hours  sodium chloride 0.9%. 1000 milliLiter(s) IV Continuous <Continuous>      Objective:  Vital Signs Last 24 Hrs  T(C): 36.7 (02 Mar 2018 11:00), Max: 37.1 (01 Mar 2018 23:00)  T(F): 98.1 (02 Mar 2018 11:00), Max: 98.8 (01 Mar 2018 23:00)  HR: 101 (02 Mar 2018 15:29) (80 - 101)  BP: --  BP(mean): --  RR: 16 (02 Mar 2018 15:29) (0 - 26)  SpO2: 100% (02 Mar 2018 15:29) (87% - 100%)    PHYSICAL EXAM:  Constitutional:no acute distress, hi-flow oxygen  Eyes:WALT, EOMI  Ear/Nose/Throat: no oral lesions, 	  Respiratory: improved BS at the right base, generally clear  Cardiovascular: S1S2  sternotomy wound dressing dry and intact  old LVAD site with a very large amount of sero-sanguinous drainage  Gastrointestinal:soft, (+) BS, no tenderness  Extremities: upper right arm with edema > left side  No Lymphadenopathy  IV sites not inflammed.    LABS:                        10.4   21.6  )-----------( 290      ( 02 Mar 2018 03:34 )             31.6     03-02    143  |  107  |  38<H>  ----------------------------<  102<H>  4.0   |  24  |  1.06    Ca    7.9<L>      02 Mar 2018 11:03  Phos  3.2     03-02  Mg     2.8     03-02    TPro  5.8<L>  /  Alb  3.3  /  TBili  0.6  /  DBili  x   /  AST  18  /  ALT  11  /  AlkPhos  57  03-02    PT/INR - ( 02 Mar 2018 15:06 )   PT: 13.8 sec;   INR: 1.26 ratio         PTT - ( 02 Mar 2018 15:06 )  PTT:34.8 sec      MICROBIOLOGY:            RECENT CULTURES:  02-23 @ 16:56  Pericardial Pericardial Fluid  --  --  --    No growth  --      RADIOLOGY & ADDITIONAL STUDIES:    < from: Xray Chest 1 View- PORTABLE-Urgent (03.02.18 @ 12:26) >  IMPRESSION:   Left lower lobe platelike atelectasis. No pneumothorax.    < end of copied text >

## 2018-03-02 NOTE — PHYSICAL THERAPY INITIAL EVALUATION ADULT - GAIT DEVIATIONS NOTED, PT EVAL
decreased marleny/decreased step length/decreased stride length
decreased weight-shifting ability/decreased step length

## 2018-03-03 LAB
ALBUMIN SERPL ELPH-MCNC: 3 G/DL — LOW (ref 3.3–5)
ALP SERPL-CCNC: 64 U/L — SIGNIFICANT CHANGE UP (ref 40–120)
ALT FLD-CCNC: 12 U/L RC — SIGNIFICANT CHANGE UP (ref 10–45)
ANION GAP SERPL CALC-SCNC: 9 MMOL/L — SIGNIFICANT CHANGE UP (ref 5–17)
APTT BLD: 34.4 SEC — SIGNIFICANT CHANGE UP (ref 27.5–37.4)
AST SERPL-CCNC: 25 U/L — SIGNIFICANT CHANGE UP (ref 10–40)
BASE EXCESS BLDV CALC-SCNC: -0.1 MMOL/L — SIGNIFICANT CHANGE UP (ref -2–2)
BILIRUB SERPL-MCNC: 0.5 MG/DL — SIGNIFICANT CHANGE UP (ref 0.2–1.2)
BUN SERPL-MCNC: 38 MG/DL — HIGH (ref 7–23)
CALCIUM SERPL-MCNC: 7.7 MG/DL — LOW (ref 8.4–10.5)
CHLORIDE SERPL-SCNC: 108 MMOL/L — SIGNIFICANT CHANGE UP (ref 96–108)
CO2 BLDV-SCNC: 26 MMOL/L — SIGNIFICANT CHANGE UP (ref 22–30)
CO2 SERPL-SCNC: 23 MMOL/L — SIGNIFICANT CHANGE UP (ref 22–31)
CREAT SERPL-MCNC: 1.1 MG/DL — SIGNIFICANT CHANGE UP (ref 0.5–1.3)
GAS PNL BLDA: SIGNIFICANT CHANGE UP
GAS PNL BLDV: SIGNIFICANT CHANGE UP
GLUCOSE BLDC GLUCOMTR-MCNC: 117 MG/DL — HIGH (ref 70–99)
GLUCOSE BLDC GLUCOMTR-MCNC: 91 MG/DL — SIGNIFICANT CHANGE UP (ref 70–99)
GLUCOSE SERPL-MCNC: 110 MG/DL — HIGH (ref 70–99)
HCO3 BLDV-SCNC: 25 MMOL/L — SIGNIFICANT CHANGE UP (ref 21–29)
HCT VFR BLD CALC: 27.6 % — LOW (ref 39–50)
HCV RNA SERPL NAA DL=5-ACNC: 3339 IU/ML — HIGH
HCV RNA SPEC NAA+PROBE-LOG IU: 3.52 LOGIU/ML — HIGH
HGB BLD-MCNC: 8.8 G/DL — LOW (ref 13–17)
HOROWITZ INDEX BLDV+IHG-RTO: 40 — SIGNIFICANT CHANGE UP
INR BLD: 1.22 RATIO — HIGH (ref 0.88–1.16)
MCHC RBC-ENTMCNC: 30.4 PG — SIGNIFICANT CHANGE UP (ref 27–34)
MCHC RBC-ENTMCNC: 31.7 GM/DL — LOW (ref 32–36)
MCV RBC AUTO: 95.9 FL — SIGNIFICANT CHANGE UP (ref 80–100)
PCO2 BLDV: 43 MMHG — SIGNIFICANT CHANGE UP (ref 35–50)
PH BLDV: 7.38 — SIGNIFICANT CHANGE UP (ref 7.35–7.45)
PLATELET # BLD AUTO: 295 K/UL — SIGNIFICANT CHANGE UP (ref 150–400)
PO2 BLDV: 40 MMHG — SIGNIFICANT CHANGE UP (ref 25–45)
POTASSIUM SERPL-MCNC: 4.1 MMOL/L — SIGNIFICANT CHANGE UP (ref 3.5–5.3)
POTASSIUM SERPL-SCNC: 4.1 MMOL/L — SIGNIFICANT CHANGE UP (ref 3.5–5.3)
PROT SERPL-MCNC: 5.5 G/DL — LOW (ref 6–8.3)
PROTHROM AB SERPL-ACNC: 13.3 SEC — HIGH (ref 9.8–12.7)
RBC # BLD: 2.88 M/UL — LOW (ref 4.2–5.8)
RBC # FLD: 15.9 % — HIGH (ref 10.3–14.5)
SAO2 % BLDV: 68 % — SIGNIFICANT CHANGE UP (ref 67–88)
SODIUM SERPL-SCNC: 140 MMOL/L — SIGNIFICANT CHANGE UP (ref 135–145)
TACROLIMUS SERPL-MCNC: 12.5 NG/ML — SIGNIFICANT CHANGE UP
WBC # BLD: 16.4 K/UL — HIGH (ref 3.8–10.5)
WBC # FLD AUTO: 16.4 K/UL — HIGH (ref 3.8–10.5)

## 2018-03-03 PROCEDURE — 99232 SBSQ HOSP IP/OBS MODERATE 35: CPT

## 2018-03-03 PROCEDURE — 99233 SBSQ HOSP IP/OBS HIGH 50: CPT

## 2018-03-03 PROCEDURE — 71045 X-RAY EXAM CHEST 1 VIEW: CPT | Mod: 26

## 2018-03-03 RX ORDER — ACETAMINOPHEN 500 MG
1000 TABLET ORAL ONCE
Qty: 0 | Refills: 0 | Status: COMPLETED | OUTPATIENT
Start: 2018-03-03 | End: 2018-03-03

## 2018-03-03 RX ORDER — ENOXAPARIN SODIUM 100 MG/ML
80 INJECTION SUBCUTANEOUS EVERY 12 HOURS
Qty: 0 | Refills: 0 | Status: DISCONTINUED | OUTPATIENT
Start: 2018-03-03 | End: 2018-03-07

## 2018-03-03 RX ADMIN — VALGANCICLOVIR 450 MILLIGRAM(S): 450 TABLET, FILM COATED ORAL at 20:06

## 2018-03-03 RX ADMIN — Medication 15 MILLIGRAM(S): at 20:06

## 2018-03-03 RX ADMIN — Medication 500000 UNIT(S): at 01:15

## 2018-03-03 RX ADMIN — Medication 3 MILLILITER(S): at 05:27

## 2018-03-03 RX ADMIN — PANTOPRAZOLE SODIUM 40 MILLIGRAM(S): 20 TABLET, DELAYED RELEASE ORAL at 05:34

## 2018-03-03 RX ADMIN — Medication 3 MILLILITER(S): at 11:05

## 2018-03-03 RX ADMIN — TACROLIMUS 5 MILLIGRAM(S): 5 CAPSULE ORAL at 07:58

## 2018-03-03 RX ADMIN — MYCOPHENOLATE MOFETIL 1000 MILLIGRAM(S): 250 CAPSULE ORAL at 07:58

## 2018-03-03 RX ADMIN — Medication 3 MILLILITER(S): at 00:11

## 2018-03-03 RX ADMIN — MEROPENEM 100 MILLIGRAM(S): 1 INJECTION INTRAVENOUS at 12:41

## 2018-03-03 RX ADMIN — Medication 500000 UNIT(S): at 17:01

## 2018-03-03 RX ADMIN — MYCOPHENOLATE MOFETIL 1000 MILLIGRAM(S): 250 CAPSULE ORAL at 20:06

## 2018-03-03 RX ADMIN — MEROPENEM 100 MILLIGRAM(S): 1 INJECTION INTRAVENOUS at 21:41

## 2018-03-03 RX ADMIN — Medication 400 MILLIGRAM(S): at 23:30

## 2018-03-03 RX ADMIN — PANTOPRAZOLE SODIUM 40 MILLIGRAM(S): 20 TABLET, DELAYED RELEASE ORAL at 17:00

## 2018-03-03 RX ADMIN — Medication 500000 UNIT(S): at 23:19

## 2018-03-03 RX ADMIN — ENOXAPARIN SODIUM 80 MILLIGRAM(S): 100 INJECTION SUBCUTANEOUS at 17:00

## 2018-03-03 RX ADMIN — VALGANCICLOVIR 450 MILLIGRAM(S): 450 TABLET, FILM COATED ORAL at 08:02

## 2018-03-03 RX ADMIN — TACROLIMUS 5 MILLIGRAM(S): 5 CAPSULE ORAL at 20:06

## 2018-03-03 RX ADMIN — Medication 3 MILLILITER(S): at 17:07

## 2018-03-03 RX ADMIN — Medication 500000 UNIT(S): at 12:41

## 2018-03-03 RX ADMIN — Medication 1 TABLET(S): at 12:41

## 2018-03-03 RX ADMIN — MEROPENEM 100 MILLIGRAM(S): 1 INJECTION INTRAVENOUS at 05:34

## 2018-03-03 RX ADMIN — Medication 15 MILLIGRAM(S): at 07:58

## 2018-03-03 RX ADMIN — Medication 500000 UNIT(S): at 05:34

## 2018-03-03 RX ADMIN — Medication 2: at 08:35

## 2018-03-03 NOTE — PROGRESS NOTE ADULT - ASSESSMENT
68 yo man s/p prior LVAD placement, no infections related to the LVAD prior to undergoing an orthotopic heart transplant 2/23 from a reportedly high risk donor HCV. Patient on broad federica-operative prophylaxis to Vancomycin/Meropenem/Fluconazole based upon his prior LVAD placement and prolonged hospitalization pre-transplant. Intermediate risk for CMV (seropositive); intermediate risk for toxoplasmosis (seropositive R). Improved aeration today at right lung base. He had Significant amount of sero-sanguinous drainage from old LVAD exit site yesterday and vanco was added    - Plan:  Continue OI prophylaxis with Valganciclovir, Bactrim, Nystatin  Continue Meropenem 1 gram q 8hr  c/w Vancomycin 1 gram q 12hr to increase coverage of skin jsoe based upon exit site drainage  He will need a Vanco trough prior to the fourth dose  Hep C viral load in the HCV donor is pending- the donor genotype is 1a  HCV viral load 392and genotype 1a in patient/recipient was sent today- patient will need therapy for HCV as an out patient  Blood cultures are NGTD 66 yo man s/p prior LVAD placement, no infections related to the LVAD prior to undergoing an orthotopic heart transplant 2/23 from a reportedly high risk donor HCV. Patient on broad federica-operative prophylaxis to Vancomycin/Meropenem/Fluconazole based upon his prior LVAD placement and prolonged hospitalization pre-transplant. Intermediate risk for CMV (seropositive); intermediate risk for toxoplasmosis (seropositive R). Improved aeration today at right lung base. He had Significant amount of sero-sanguinous drainage from old LVAD exit site yesterday and vanco was added    - Plan:  Continue OI prophylaxis with Valganciclovir, Bactrim, Nystatin  Continue Meropenem 1 gram q 8hr  c/w Vancomycin 1 gram q 12hr to increase coverage of skin jose based upon exit site drainage  He will need a Vanco trough prior to the fourth dose  Hep C viral load in the HCV donor is pending- the donor genotype is 1a  HCV viral load 392and genotype 1a in patient/recipient was sent today- patient will need therapy for HCV as an out patient  Blood cultures are NGTD   if worsening drainage from the LVAD site or any purulence, will need a CT to evaluate

## 2018-03-03 NOTE — SWALLOW BEDSIDE ASSESSMENT ADULT - ASR SWALLOW RECOMMEND DIAG
If concerned for silent aspiration, an objective swallow test may be ordered for comprehensive swallow assessment.

## 2018-03-03 NOTE — PROGRESS NOTE ADULT - ASSESSMENT
Mr. 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 Underwent biopsy which showed CMR 1R, AMR 1 (no cellular injury). Repeat DSA negative.     Immunosuppression prophylaxis:  Tacrolimus - started on prograf 2/25; received 5 mg last evening at 20:00; tac level 12 (reduced from 14); continue at 5 mg q12; please check levels at 7:30 am (stat)  Continue CellCept 1000 mg PO BID  Steroids - continue taper; on prednisone 15 mg PO q12h; continue until next week  	  Antimicrobial prophylaxis:  Intermediate risk CMV - on oral valganciclovir 450 mg q12  Intermediate risk Toxo - on Bactrim PPx  PCP - on Bactrim 1 SS tab daily     Graft function:  LV function improved; persistent mild RV dysfunction however excellent HD off inotropes  underwent biospy 3/1; positive for grade 1R/AMR 1; DSA negative    Hypoxia:  likely d/t atelectasis; improving; s/p bronch with no mucous plug    Other prophylaxis:  Protonix 40 mg PO daily for GI prophylaxis while on steroids.    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

## 2018-03-03 NOTE — SWALLOW BEDSIDE ASSESSMENT ADULT - SLP GENERAL OBSERVATIONS
Pt awake, alert, able to follow commands and answer questions appropriately, fluent verbal output, denied communication deficits. Denied dysphagia.

## 2018-03-03 NOTE — SWALLOW BEDSIDE ASSESSMENT ADULT - SLP PERTINENT HISTORY OF CURRENT PROBLEM
67M PMH Chronic Systolic HF (ACC/AHA Stage D) due to NICMP s/p LVAD 6/12/17, GIB s/p Cautery (7/25/2017), known AV Malformations, Chronic Anemia due to numerous GIBs (off AC), A-Fib, VT s/p AICD. Pt admitted due to elevated LDH level of 646 for further evaluation and observation. Pt with h/o recurrent GIB 2/2 AVMs s/p clipping/APC that were so frequent that he was off AC until recently when noted increasing LDH so restarted on coumadin but remained subtherapeutic. Due to inability to use lovenox given prior GIB he was admitted for heparin gtt/coumadin. Pt last hospitalized 10/30-11/4, was transferred to [facility] for consideration of heart transplant listing, but lacked secondary insurance so unable to get listed. Also evaluated at [facility] where they had concerns regarding rehab ability given R knee pain. Over past several months has done fairly well off AC without chest pain/SOB/orthopnea. Denies LVAD alarms or urinary discoloration. Has gained weight (approx 20 pounds in time span).

## 2018-03-03 NOTE — SWALLOW BEDSIDE ASSESSMENT ADULT - SWALLOW EVAL: DIAGNOSIS
Oropharyngeal swallow sequence appears WFL to tolerate a regular texture diet with no overt s/s of laryngeal penetration or aspiration.

## 2018-03-03 NOTE — PROGRESS NOTE ADULT - SUBJECTIVE AND OBJECTIVE BOX
Follow Up:  post heart transplant    Interval History: pt afebrile, still in CTU    ROS:      All other systems negative    Constitutional: no fever, no chills  HEENT:  no vision changes, no sore throat, no rhinorrhea  Cardiovascular:  no chest pain, no palpitation  Respiratory:  no SOB, no cough  GI:  no abd pain, no vomiting, no diarrhea  urinary: no dysuria, no hematuria, no flank pain  musculoskeletal:  no joint pain, no joint swelling  skin:  no rash  neurology:  no headache, no seizure, no change in mental status        Allergies  No Known Allergies        ANTIMICROBIALS:  meropenem  IVPB 1000 every 8 hours  nystatin    Suspension [id number] every 6 hours  trimethoprim   80 mG/sulfamethoxazole 400 mG 1 daily  valGANciclovir 450 every 12 hours      OTHER MEDS:  ALBUTerol/ipratropium for Nebulization 3 milliLiter(s) Nebulizer every 6 hours  dexmedetomidine Infusion 0.2 MICROgram(s)/kG/Hr IV Continuous <Continuous>  docusate sodium 100 milliGRAM(s) Oral three times a day  enoxaparin Injectable 80 milliGRAM(s) SubCutaneous every 12 hours  insulin lispro (HumaLOG) corrective regimen sliding scale   SubCutaneous three times a day before meals  insulin lispro (HumaLOG) corrective regimen sliding scale   SubCutaneous at bedtime  mycophenolate mofetil 1000 milliGRAM(s) Oral <User Schedule>  pantoprazole  Injectable 40 milliGRAM(s) IV Push two times a day  predniSONE   Tablet 15 milliGRAM(s) Oral <User Schedule>  sodium chloride 0.9%. 1000 milliLiter(s) IV Continuous <Continuous>  tacrolimus 5 milliGRAM(s) Oral <User Schedule>      Vital Signs Last 24 Hrs  T(C): 36 (03 Mar 2018 09:00), Max: 36.8 (02 Mar 2018 23:00)  T(F): 96.8 (03 Mar 2018 09:00), Max: 98.2 (02 Mar 2018 23:00)  HR: 92 (03 Mar 2018 11:00) (82 - 105)  BP: --  BP(mean): --  RR: 10 (03 Mar 2018 11:00) (8 - 21)  SpO2: 100% (03 Mar 2018 08:51) (90% - 100%)    Physical Exam:  General:    NAD,  non toxic, A&O x 3  HEENT:    no oropharyngeal lesions,   no LAD,   neck supple  Cardio:     regular S1, S2,  no murmur, sternotomy wound clean, serosanguineous from the LVAD site  Respiratory:    clear b/l,    no wheezing  abd:     soft,   BS +,   no tenderness,    no organomegaly  :   no CVAT,  no suprapubic tenderness,   no  diaz  Musculoskeletal:   no joint swelling,   no edema  vascular: no lines, normal pulses  Skin:    no rash  Neurologic:     no focal deficit  psych: normal affect, no suicidal ideation                          8.8    16.4  )-----------( 295      ( 03 Mar 2018 01:28 )             27.6       03-03    140  |  108  |  38<H>  ----------------------------<  110<H>  4.1   |  23  |  1.10    Ca    7.7<L>      03 Mar 2018 01:28  Phos  3.2     03-02  Mg     2.8     03-02    TPro  5.5<L>  /  Alb  3.0<L>  /  TBili  0.5  /  DBili  x   /  AST  25  /  ALT  12  /  AlkPhos  64  03-03          MICROBIOLOGY:  v  .Sputum Sputum  03-02-18 --  --    No polymorphonuclear leukocytes per low power field  Rare Squamous epithelial cells per low power field  Rare Gram Positive Cocci in Clusters per oil power field  Rare Gram Negative Rods per oil power field      .Blood Blood-Peripheral  03-02-18   No growth to date.  --  --      .Blood Blood  03-01-18   No growth to date.  --  --      Pericardial Pericardial Fluid  02-23-18   No growth  --    No polymorphonuclear cells seen  No organisms seen  by cytocentrifuge      .Blood Blood-Peripheral  02-15-18   No growth at 5 days.  --  --      .Blood Blood-Peripheral  02-15-18   No growth at 5 days.  --  --      .Blood Blood-Peripheral  02-09-18   No growth at 5 days.  --  --      .Blood Blood-Venous  02-09-18   No growth at 5 days.  --  --        CMV IgG Antibody: >10.00 U/mL (02-22-18 @ 23:45)  HIV-1 RNA Quantitative, Viral Load Log: NOT DET. lg /mL (02-02-18 @ 19:25)          Culture - Sputum (collected 02 Mar 2018 16:32)  Source: .Sputum Sputum  Gram Stain (02 Mar 2018 22:01):    No polymorphonuclear leukocytes per low power field    Rare Squamous epithelial cells per low power field    Rare Gram Positive Cocci in Clusters per oil power field    Rare Gram Negative Rods per oil power field    Culture - Blood (collected 02 Mar 2018 06:41)  Source: .Blood Blood-Peripheral  Preliminary Report (03 Mar 2018 07:01):    No growth to date.    Culture - Blood (collected 01 Mar 2018 19:48)  Source: .Blood Blood  Preliminary Report (02 Mar 2018 20:01):    No growth to date.      RADIOLOGY:  < from: Xray Chest 1 View- PORTABLE-Routine (03.03.18 @ 04:43) >  Asymmetric elevation of the right hemidiaphragm. Unchanged left lower   lung platelike atelectasis.

## 2018-03-03 NOTE — SWALLOW BEDSIDE ASSESSMENT ADULT - PHARYNGEAL PHASE
Within functional limits/no overt s/s laryngeal penetration/aspiration no overt s/s laryngeal penetration/aspiration/Within functional limits

## 2018-03-03 NOTE — SWALLOW BEDSIDE ASSESSMENT ADULT - ASR SWALLOW ASPIRATION MONITOR
Monitor for s/s aspiration/laryngeal penetration. If noted:  D/C p.o. intake, provide non-oral nutrition/hydration/meds, and contact this service @ [phone]/ksenia voice/upper respiratory infection/fever/throat clearing/pneumonia/change of breathing pattern/cough

## 2018-03-03 NOTE — SWALLOW BEDSIDE ASSESSMENT ADULT - COMMENTS
HC:   2/9: GI consulted for anemia, downtrending Hgb: last BM 3 days ago, not seen any melena or hematochezia recently; last enterosocpy was 9/2017. He reports having had colonoscopy within 1-2 years, but it was not done here and results unknown. Had capsule endoscopy also 11/2017- there was blood seen in cecum, but colonoscopy deferred as bleeding/anemia resolved.   EGD 2/9: normal esophagus, unremarkable stomach. Normal duodenal bulb, first part of duodenum, 2nd part of duodenum and 3rd part of duodenum. Distal duodenal/Jejunal blood clot. No active source of bleeding found. No active bleeding seen.  2/11: episodes of melena overnight. IMPRESSION: Abnormal GI bleeding scan.    Active bleeding site in the proximal duodenum.  2/11: HC:   2/9: GI consulted for anemia, downtrending Hgb: last BM 3 days ago, not seen any melena or hematochezia recently; last enterosocpy was 9/2017. He reports having had colonoscopy within 1-2 years, but it was not done here and results unknown. Had capsule endoscopy also 11/2017- there was blood seen in cecum, but colonoscopy deferred as bleeding/anemia resolved.   EGD 2/9: normal esophagus, unremarkable stomach. Normal duodenal bulb, first part of duodenum, 2nd part of duodenum and 3rd part of duodenum. Distal duodenal/Jejunal blood clot. No active source of bleeding found. No active bleeding seen.  2/11: episodes of melena overnight -> Abnormal GI bleeding scan. Active bleeding site in proximal duodenum. s/p Enteroscopy by GI: Normal esophagus and stomach; Red clotted blood in entire small bowel; Multiple red spots seen in duodenum and jejunum, four sites with red spots were cauterized using gold probe; One actively oozing angioectasia seen in distal duodenum, it was cauterized. This is likely the source of bleeding. Pt with hemorrhagic shock, persistent cardiopulmonary dysfunction, and cardiogenic shock, requiring intubation for procedure, transfusions, pressors, extubated same day.  2/23: s/p Cardiac transplant, LVAD explantation, AICD removal, with post-op course c/b primary graft dysfuction with biventricular dysfunction s/p plasmapharesis/IVIg with improvement in LV function to 55% but with persistent RV dysfunction; now off inotropes. 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 Underwent biopsy (3/1) which showed CMR 1R, AMR 1 (no cellular injury). Repeat DSA negative.   2/27: s/p extubation.  3/1: Per ID: WBC [id number]; RLL atelectasis or ? early pneumonia.  CXR: Redemonstration of R basilar opacity  compatible with R pleural effusion and RLL PNA and/or atelectasis.    3/2: s/p RLL collapse -> s/p bronch: revealed [facility]. CHRISTINE and LLL evaluation revealed clear. RUL, RML, RLL revealed clear no mucous plug noted.

## 2018-03-03 NOTE — PROGRESS NOTE ADULT - SUBJECTIVE AND OBJECTIVE BOX
Interval History:  doing well  serous drainage from driveline  was delirious overnight    Medications:  ALBUTerol/ipratropium for Nebulization 3 milliLiter(s) Nebulizer every 6 hours  dexmedetomidine Infusion 0.2 MICROgram(s)/kG/Hr IV Continuous <Continuous>  docusate sodium 100 milliGRAM(s) Oral three times a day  enoxaparin Injectable 80 milliGRAM(s) SubCutaneous every 12 hours  insulin lispro (HumaLOG) corrective regimen sliding scale   SubCutaneous three times a day before meals  insulin lispro (HumaLOG) corrective regimen sliding scale   SubCutaneous at bedtime  meropenem  IVPB 1000 milliGRAM(s) IV Intermittent every 8 hours  mycophenolate mofetil 1000 milliGRAM(s) Oral <User Schedule>  nystatin    Suspension 544054 Unit(s) Oral every 6 hours  pantoprazole  Injectable 40 milliGRAM(s) IV Push two times a day  predniSONE   Tablet 15 milliGRAM(s) Oral <User Schedule>  sodium chloride 0.9%. 1000 milliLiter(s) IV Continuous <Continuous>  tacrolimus 5 milliGRAM(s) Oral <User Schedule>  trimethoprim   80 mG/sulfamethoxazole 400 mG 1 Tablet(s) Oral daily  valGANciclovir 450 milliGRAM(s) Oral every 12 hours    Vitals:  T(C): 36 (18 @ 09:00), Max: 36.8 (18 @ 23:00)  HR: 99 (18 @ 13:00) (82 - 105)  ABP: 128/75 (18 @ 13:00) (90/51 - 142/76)  ABP(mean): 94 (18 @ 13:00) (64 - 102)  RR: 25 (18 @ 13:00) (8 - 25)  SpO2: 50% (18 @ 13:00) (50% - 100%)    Daily     Daily Weight in k.7 (03 Mar 2018 03:00)    I&O's Summary    02 Mar 2018 07:01  -  03 Mar 2018 07:00  --------------------------------------------------------  IN: 1871.5 mL / OUT: 865 mL / NET: 1006.5 mL    03 Mar 2018 07:01  -  03 Mar 2018 14:29  --------------------------------------------------------  IN: 60 mL / OUT: 200 mL / NET: -140 mL    Physical Exam:  Appearance: No Acute Distress. Facial swelling  HEENT: No JVD  Cardiovascular: Normal S1 S2, No murmurs/rubs/gallops  Respiratory: Clear to auscultation bilaterally  Gastrointestinal: Soft, Non-tender	  Skin: No cyanosis	  Neurologic: Non-focal  Extremities: No LE edema  Psychiatry: A & O x 3, Mood & affect appropriate    Labs:                        8.8    16.4  )-----------( 295      ( 03 Mar 2018 01:28 )             27.6     -    140  |  108  |  38<H>  ----------------------------<  110<H>  4.1   |  23  |  1.10    Ca    7.7<L>      03 Mar 2018 01:28  Phos  3.2     -  Mg     2.8     -    TPro  5.5<L>  /  Alb  3.0<L>  /  TBili  0.5  /  DBili  x   /  AST  25  /  ALT  12  /  AlkPhos  64  03-03    PT/INR - ( 03 Mar 2018 01:28 )   PT: 13.3 sec;   INR: 1.22 ratio         PTT - ( 03 Mar 2018 01:28 )  PTT:34.4 sec    Serum Pro-Brain Natriuretic Peptide: 8160 pg/mL ( @ 03:34)  Serum Pro-Brain Natriuretic Peptide: 3895 pg/mL ( @ 02:49)  Serum Pro-Brain Natriuretic Peptide: 9103 pg/mL ( @ 02:14)    Oxygen Saturation, Mixed: 65 % ( @ 09:10)  Oxygen Saturation, Mixed: 69 % ( @ 02:16)  Oxygen Saturation, Mixed: 71 % ( @ 22:25)  Oxygen Saturation, Mixed: 70 % ( @ 17:24)    Lactate Dehydrogenase, Serum: 409 U/L ( @ 01:03)    TELEMETRY:  no events

## 2018-03-04 DIAGNOSIS — R73.9 HYPERGLYCEMIA, UNSPECIFIED: ICD-10-CM

## 2018-03-04 DIAGNOSIS — I82.90 ACUTE EMBOLISM AND THROMBOSIS OF UNSPECIFIED VEIN: ICD-10-CM

## 2018-03-04 LAB
-  AMIKACIN: SIGNIFICANT CHANGE UP
-  AZTREONAM: SIGNIFICANT CHANGE UP
-  CEFEPIME: SIGNIFICANT CHANGE UP
-  CEFTAZIDIME: SIGNIFICANT CHANGE UP
-  CIPROFLOXACIN: SIGNIFICANT CHANGE UP
-  GENTAMICIN: SIGNIFICANT CHANGE UP
-  IMIPENEM: SIGNIFICANT CHANGE UP
-  LEVOFLOXACIN: SIGNIFICANT CHANGE UP
-  MEROPENEM: SIGNIFICANT CHANGE UP
-  PIPERACILLIN/TAZOBACTAM: SIGNIFICANT CHANGE UP
-  TOBRAMYCIN: SIGNIFICANT CHANGE UP
ALBUMIN SERPL ELPH-MCNC: 3.1 G/DL — LOW (ref 3.3–5)
ALP SERPL-CCNC: 55 U/L — SIGNIFICANT CHANGE UP (ref 40–120)
ALT FLD-CCNC: 14 U/L RC — SIGNIFICANT CHANGE UP (ref 10–45)
ANION GAP SERPL CALC-SCNC: 11 MMOL/L — SIGNIFICANT CHANGE UP (ref 5–17)
APTT BLD: 30 SEC — SIGNIFICANT CHANGE UP (ref 27.5–37.4)
AST SERPL-CCNC: 21 U/L — SIGNIFICANT CHANGE UP (ref 10–40)
BILIRUB SERPL-MCNC: 0.5 MG/DL — SIGNIFICANT CHANGE UP (ref 0.2–1.2)
BUN SERPL-MCNC: 32 MG/DL — HIGH (ref 7–23)
CALCIUM SERPL-MCNC: 7.8 MG/DL — LOW (ref 8.4–10.5)
CHLORIDE SERPL-SCNC: 107 MMOL/L — SIGNIFICANT CHANGE UP (ref 96–108)
CO2 SERPL-SCNC: 22 MMOL/L — SIGNIFICANT CHANGE UP (ref 22–31)
CREAT SERPL-MCNC: 1.23 MG/DL — SIGNIFICANT CHANGE UP (ref 0.5–1.3)
CULTURE RESULTS: SIGNIFICANT CHANGE UP
GAS PNL BLDA: SIGNIFICANT CHANGE UP
GLUCOSE BLDC GLUCOMTR-MCNC: 100 MG/DL — HIGH (ref 70–99)
GLUCOSE BLDC GLUCOMTR-MCNC: 100 MG/DL — HIGH (ref 70–99)
GLUCOSE BLDC GLUCOMTR-MCNC: 128 MG/DL — HIGH (ref 70–99)
GLUCOSE BLDC GLUCOMTR-MCNC: 166 MG/DL — HIGH (ref 70–99)
GLUCOSE SERPL-MCNC: 105 MG/DL — HIGH (ref 70–99)
HCT VFR BLD CALC: 29.4 % — LOW (ref 39–50)
HGB BLD-MCNC: 9.6 G/DL — LOW (ref 13–17)
INR BLD: 1.19 RATIO — HIGH (ref 0.88–1.16)
MAGNESIUM SERPL-MCNC: 2.4 MG/DL — SIGNIFICANT CHANGE UP (ref 1.6–2.6)
MCHC RBC-ENTMCNC: 31.2 PG — SIGNIFICANT CHANGE UP (ref 27–34)
MCHC RBC-ENTMCNC: 32.7 GM/DL — SIGNIFICANT CHANGE UP (ref 32–36)
MCV RBC AUTO: 95.3 FL — SIGNIFICANT CHANGE UP (ref 80–100)
METHOD TYPE: SIGNIFICANT CHANGE UP
ORGANISM # SPEC MICROSCOPIC CNT: SIGNIFICANT CHANGE UP
ORGANISM # SPEC MICROSCOPIC CNT: SIGNIFICANT CHANGE UP
PLATELET # BLD AUTO: 403 K/UL — HIGH (ref 150–400)
POTASSIUM SERPL-MCNC: 4.3 MMOL/L — SIGNIFICANT CHANGE UP (ref 3.5–5.3)
POTASSIUM SERPL-SCNC: 4.3 MMOL/L — SIGNIFICANT CHANGE UP (ref 3.5–5.3)
PROT SERPL-MCNC: 5.6 G/DL — LOW (ref 6–8.3)
PROTHROM AB SERPL-ACNC: 12.9 SEC — HIGH (ref 9.8–12.7)
RBC # BLD: 3.09 M/UL — LOW (ref 4.2–5.8)
RBC # FLD: 15.6 % — HIGH (ref 10.3–14.5)
SODIUM SERPL-SCNC: 140 MMOL/L — SIGNIFICANT CHANGE UP (ref 135–145)
SPECIMEN SOURCE: SIGNIFICANT CHANGE UP
TACROLIMUS SERPL-MCNC: 9.9 NG/ML — SIGNIFICANT CHANGE UP
WBC # BLD: 18.5 K/UL — HIGH (ref 3.8–10.5)
WBC # FLD AUTO: 18.5 K/UL — HIGH (ref 3.8–10.5)

## 2018-03-04 PROCEDURE — 99292 CRITICAL CARE ADDL 30 MIN: CPT

## 2018-03-04 PROCEDURE — 99291 CRITICAL CARE FIRST HOUR: CPT

## 2018-03-04 PROCEDURE — 71045 X-RAY EXAM CHEST 1 VIEW: CPT | Mod: 26

## 2018-03-04 PROCEDURE — 99233 SBSQ HOSP IP/OBS HIGH 50: CPT

## 2018-03-04 RX ORDER — TACROLIMUS 5 MG/1
6 CAPSULE ORAL
Qty: 0 | Refills: 0 | Status: DISCONTINUED | OUTPATIENT
Start: 2018-03-04 | End: 2018-03-06

## 2018-03-04 RX ORDER — TACROLIMUS 5 MG/1
1 CAPSULE ORAL ONCE
Qty: 0 | Refills: 0 | Status: COMPLETED | OUTPATIENT
Start: 2018-03-04 | End: 2018-03-04

## 2018-03-04 RX ADMIN — Medication 3 MILLILITER(S): at 00:17

## 2018-03-04 RX ADMIN — ENOXAPARIN SODIUM 80 MILLIGRAM(S): 100 INJECTION SUBCUTANEOUS at 05:13

## 2018-03-04 RX ADMIN — MEROPENEM 100 MILLIGRAM(S): 1 INJECTION INTRAVENOUS at 22:08

## 2018-03-04 RX ADMIN — MYCOPHENOLATE MOFETIL 1000 MILLIGRAM(S): 250 CAPSULE ORAL at 19:59

## 2018-03-04 RX ADMIN — Medication 1000 MILLIGRAM(S): at 00:00

## 2018-03-04 RX ADMIN — Medication 3 MILLILITER(S): at 05:56

## 2018-03-04 RX ADMIN — TACROLIMUS 6 MILLIGRAM(S): 5 CAPSULE ORAL at 20:00

## 2018-03-04 RX ADMIN — Medication 500000 UNIT(S): at 18:32

## 2018-03-04 RX ADMIN — PANTOPRAZOLE SODIUM 40 MILLIGRAM(S): 20 TABLET, DELAYED RELEASE ORAL at 05:13

## 2018-03-04 RX ADMIN — ENOXAPARIN SODIUM 80 MILLIGRAM(S): 100 INJECTION SUBCUTANEOUS at 18:32

## 2018-03-04 RX ADMIN — Medication 2: at 07:26

## 2018-03-04 RX ADMIN — Medication 3 MILLILITER(S): at 12:02

## 2018-03-04 RX ADMIN — MEROPENEM 100 MILLIGRAM(S): 1 INJECTION INTRAVENOUS at 14:20

## 2018-03-04 RX ADMIN — MEROPENEM 100 MILLIGRAM(S): 1 INJECTION INTRAVENOUS at 05:13

## 2018-03-04 RX ADMIN — VALGANCICLOVIR 450 MILLIGRAM(S): 450 TABLET, FILM COATED ORAL at 19:59

## 2018-03-04 RX ADMIN — VALGANCICLOVIR 450 MILLIGRAM(S): 450 TABLET, FILM COATED ORAL at 08:03

## 2018-03-04 RX ADMIN — Medication 500000 UNIT(S): at 05:13

## 2018-03-04 RX ADMIN — Medication 15 MILLIGRAM(S): at 19:59

## 2018-03-04 RX ADMIN — MYCOPHENOLATE MOFETIL 1000 MILLIGRAM(S): 250 CAPSULE ORAL at 08:03

## 2018-03-04 RX ADMIN — TACROLIMUS 5 MILLIGRAM(S): 5 CAPSULE ORAL at 08:03

## 2018-03-04 RX ADMIN — TACROLIMUS 1 MILLIGRAM(S): 5 CAPSULE ORAL at 11:42

## 2018-03-04 RX ADMIN — Medication 15 MILLIGRAM(S): at 08:02

## 2018-03-04 RX ADMIN — Medication 500000 UNIT(S): at 23:27

## 2018-03-04 RX ADMIN — Medication 3 MILLILITER(S): at 23:27

## 2018-03-04 RX ADMIN — Medication 1 TABLET(S): at 12:15

## 2018-03-04 RX ADMIN — Medication 3 MILLILITER(S): at 19:25

## 2018-03-04 RX ADMIN — Medication 500000 UNIT(S): at 12:15

## 2018-03-04 RX ADMIN — PANTOPRAZOLE SODIUM 40 MILLIGRAM(S): 20 TABLET, DELAYED RELEASE ORAL at 18:33

## 2018-03-04 NOTE — PROGRESS NOTE ADULT - SUBJECTIVE AND OBJECTIVE BOX
Necrotic   finger tip is  stable  Most likely related to the  A line   .  On Lovenox   for  his Venous  thrombosis  Hypercoag work up in  progress

## 2018-03-04 NOTE — PROGRESS NOTE ADULT - SUBJECTIVE AND OBJECTIVE BOX
BILLY RUSSELL   MRN#: [id number]     The patient is a 67y Male with PMH of end stage heart failure s/p Heart Mate II LVAD June 2017 complicated by pump thrombosis and recurrent GI bleed, s/p orthotopic heart transplant 2/23 complicated by clotting of his right IJ, innominate and bilateral subclavian veins as well as possible hyperacute graft dysfunction 2/23 who was seen, evaluated, & examined with the CTICU staff on rounds and later in the day with a multidisciplinary care plan formulated & implemented.  All available clinical, laboratory, radiographic, pharmacologic, and electrocardiographic data were reviewed & analyzed.      The patient was in the CTICU in critical condition secondary to resolved acute hypoxic respiratory failure-persistent cardiopulmonary dysfunction, upper extremity DVT's, resolved cardiogenic shock-cardiovascular dysfunction-S/P heart transplant with graft dysfunction.     Respiratory status required supplemental oxygen,, the following of ABG’s with A-line monitoring, continuous pulse oximetry monitoring, and therapeutic Lovenox for support & to evaluate for & prevent further decompensation secondary to persistent cardiopulmonary dysfunction, resolved cardiogenic shock-cardiovascular dysfunction-S/P heart transplant with graft dysfunction, and upper extremity DVT's-right radial artery thrombosis.      Invasive hemodynamic monitoring with an A-line was required for the following of continuous MAP/BP monitoring to ensure adequate cardiovascular support and to evaluate for & help prevent decompensation while receiving Lovenox BID.      Patient required more than the usual postoperative critical care management and I provided 80 minutes of non-continuous care to the patient.  Discussed at length with the CTICU staff and helped coordinate care.

## 2018-03-04 NOTE — PROGRESS NOTE ADULT - ASSESSMENT
67 yr old male s/p  Heart transplant 2/23/18 for HF   H; GI bleed, LVAD implant, afib, anemia AICD  Intra op acute rejection of graft and Intraop IABP placed  and removed POD 1 and received plasmaphoresis x 2  2/26 UE +RT IJ inominate SC,cephalic DVT and +lt SC DVT  3/1  cardiac bx low grade rejection  3/2 bronchoscopy   neg   3/4  trf too floor,  Imipenum for low grade fevers

## 2018-03-04 NOTE — PROGRESS NOTE ADULT - PROBLEM SELECTOR PLAN 1
sp  heart transplant  2/23 sp  heart transplant  2/23  on rejection meds with HF following,  LVAD site   with VAC in place for drainage

## 2018-03-04 NOTE — PROGRESS NOTE ADULT - SUBJECTIVE AND OBJECTIVE BOX
Interval History:  doing well  wound vac on driveline   having BMs  denies CP/SOB    Medications:  ALBUTerol/ipratropium for Nebulization 3 milliLiter(s) Nebulizer every 6 hours  docusate sodium 100 milliGRAM(s) Oral three times a day  enoxaparin Injectable 80 milliGRAM(s) SubCutaneous every 12 hours  insulin lispro (HumaLOG) corrective regimen sliding scale   SubCutaneous three times a day before meals  insulin lispro (HumaLOG) corrective regimen sliding scale   SubCutaneous at bedtime  meropenem  IVPB 1000 milliGRAM(s) IV Intermittent every 8 hours  mycophenolate mofetil 1000 milliGRAM(s) Oral <User Schedule>  nystatin    Suspension 132238 Unit(s) Oral every 6 hours  pantoprazole  Injectable 40 milliGRAM(s) IV Push two times a day  predniSONE   Tablet 15 milliGRAM(s) Oral <User Schedule>  tacrolimus 6 milliGRAM(s) Oral <User Schedule>  trimethoprim   80 mG/sulfamethoxazole 400 mG 1 Tablet(s) Oral daily  valGANciclovir 450 milliGRAM(s) Oral every 12 hours    Vitals:  T(C): 36.4 (18 @ 17:00), Max: 36.7 (18 @ 00:00)  HR: 99 (18 @ 16:00) (87 - 109)  BP: 120/76 (18 @ 17:00) (120/76 - 120/76)  BP(mean): 93 (18 @ 17:00) (93 - 93)  ABP: 110/84 (18 @ 16:00) (88/75 - 137/76)  ABP(mean): 94 (18 @ 16:00) (78 - 102)  RR: 18 (18 @ 17:00) (9 - 27)  SpO2: 96% (18 @ 17:00) (94% - 100%)    Daily     Daily Weight in k.4 (04 Mar 2018 00:00)    I&O's Summary    03 Mar 2018 07:01  -  04 Mar 2018 07:00  --------------------------------------------------------  IN: 510 mL / OUT: 1350 mL / NET: -840 mL    04 Mar 2018 07:01  -  04 Mar 2018 20:06  --------------------------------------------------------  IN: 850 mL / OUT: 1050 mL / NET: -200 mL    Physical Exam:  Appearance: No Acute Distress  HEENT: No JVD; facial swelling   Cardiovascular: Normal S1 S2, No murmurs/rubs/gallops  Respiratory: Clear to auscultation bilaterally  Gastrointestinal: Soft, Non-tender	  Skin: No cyanosis	  Neurologic: Non-focal  Extremities: No LE edema; LUE swelling   Psychiatry: A & O x 3, Mood & affect appropriate    Labs:                        9.6    18.5  )-----------( 403      ( 04 Mar 2018 01:07 )             29.4     03-04    140  |  107  |  32<H>  ----------------------------<  105<H>  4.3   |  22  |  1.23    Ca    7.8<L>      04 Mar 2018 01:07  Mg     2.4     -04    TPro  5.6<L>  /  Alb  3.1<L>  /  TBili  0.5  /  DBili  x   /  AST  21  /  ALT  14  /  AlkPhos  55  03-04    PT/INR - ( 04 Mar 2018 01:07 )   PT: 12.9 sec;   INR: 1.19 ratio         PTT - ( 04 Mar 2018 01:07 )  PTT:30.0 sec      Serum Pro-Brain Natriuretic Peptide: 8160 pg/mL ( @ 03:34)  Serum Pro-Brain Natriuretic Peptide: 3895 pg/mL ( @ 02:49)  Serum Pro-Brain Natriuretic Peptide: 9103 pg/mL ( @ 02:14)  Lactate Dehydrogenase, Serum: 409 U/L ( @ 01:03

## 2018-03-04 NOTE — PROGRESS NOTE ADULT - ASSESSMENT
Mr. 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 Underwent biopsy which showed CMR 1R, AMR 1 (no cellular injury). Repeat DSA negative.     Immunosuppression prophylaxis:  Tacrolimus - started on prograf 2/25; received 5 mg last evening at 20:00; tac level 9.9 (reduced from 13); give 1 mg and increase to 6 mg q12; please check levels at 7:30 am (stat)  Continue CellCept 1000 mg PO BID  Steroids - continue taper; on prednisone 15 mg PO q12h; continue until next week  	  Antimicrobial prophylaxis:  Intermediate risk CMV - on oral valganciclovir 450 mg q12  Intermediate risk Toxo - on Bactrim PPx  PCP - on Bactrim 1 SS tab daily     Graft function:  LV function improved; persistent mild RV dysfunction however excellent HD off inotropes  underwent biospy 3/1; positive for grade 1R/AMR 1; DSA negative    Hypoxia:  likely d/t atelectasis; improving; s/p bronch with no mucous plug    Other prophylaxis:  Protonix 40 mg PO daily for GI prophylaxis while on steroids.    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

## 2018-03-04 NOTE — PROGRESS NOTE ADULT - SUBJECTIVE AND OBJECTIVE BOX
VITAL SIGNS    tele  ST  110  Vital Signs Last 24 Hrs  T(C): 36.4 (18 @ 16:00), Max: 36.8 (18 @ 20:00)  T(F): 97.5 (18 @ 16:00), Max: 98.3 (18 @ 20:00)  HR: 99 (18 @ 16:00) (87 - 109)  BP: --  RR: 16 (18 @ 16:00) (6 - 27)  SpO2: 95% (18 @ 16:00) (94% - 100%)           Daily Weight in k.4 (04 Mar 2018 00:00)      Bilirubin Total, Serum: 0.5 mg/dL ( @ 01:07)    CAPILLARY BLOOD GLUCOSE  128 (04 Mar 2018 13:00)  166 (04 Mar 2018 07:00)  106 (03 Mar 2018 22:00)      POCT Blood Glucose.: 128 mg/dL (04 Mar 2018 13:54)  POCT Blood Glucose.: 166 mg/dL (04 Mar 2018 07:21)  POCT Blood Glucose.: 91 mg/dL (03 Mar 2018 19:27)          Drains:    prior LVAD site w/ VAC    Pacing Wires    out                    PHYSICAL EXAM    Neurology: alert and oriented x 3, moves all extremities with no defecits  CV :  RRR  Sternal Wound :  CDI , Stable  Lungs:   CTA B/L  Abdomen: soft, nontender, nondistended, positive bowel sounds,   Extremities:     plus one pedal edema   no calf tenderness VITAL SIGNS    tele  ST  110  Vital Signs Last 24 Hrs  T(C): 36.4 (18 @ 16:00), Max: 36.8 (18 @ 20:00)  T(F): 97.5 (18 @ 16:00), Max: 98.3 (18 @ 20:00)  HR: 99 (18 @ 16:00) (87 - 109)  BP: --  RR: 16 (18 @ 16:00) (6 - 27)  SpO2: 95% (18 @ 16:00) (94% - 100%)           Daily Weight in k.4 (04 Mar 2018 00:00)      Bilirubin Total, Serum: 0.5 mg/dL ( @ 01:07)    CAPILLARY BLOOD GLUCOSE  128 (04 Mar 2018 13:00)  166 (04 Mar 2018 07:00)  106 (03 Mar 2018 22:00)      POCT Blood Glucose.: 128 mg/dL (04 Mar 2018 13:54)  POCT Blood Glucose.: 166 mg/dL (04 Mar 2018 07:21)  POCT Blood Glucose.: 91 mg/dL (03 Mar 2018 19:27)          Drains:    prior LVAD site w/ VAC    Pacing Wires    out                    PHYSICAL EXAM    Neurology: alert and oriented x 3, moves all extremities with no defecits  CV :  RRR  Sternal Wound :  CDI , Stable   rt lower abdomen with vac in place  Lungs:   CTA B/L  Abdomen: soft, nontender, nondistended, positive bowel sounds,   Extremities:     plus one pedal edema   no calf tenderness

## 2018-03-05 LAB
ANION GAP SERPL CALC-SCNC: 6 MMOL/L — SIGNIFICANT CHANGE UP (ref 5–17)
BUN SERPL-MCNC: 29 MG/DL — HIGH (ref 7–23)
CALCIUM SERPL-MCNC: 8 MG/DL — LOW (ref 8.4–10.5)
CHLORIDE SERPL-SCNC: 106 MMOL/L — SIGNIFICANT CHANGE UP (ref 96–108)
CO2 SERPL-SCNC: 25 MMOL/L — SIGNIFICANT CHANGE UP (ref 22–31)
CREAT SERPL-MCNC: 1.12 MG/DL — SIGNIFICANT CHANGE UP (ref 0.5–1.3)
GLUCOSE BLDC GLUCOMTR-MCNC: 111 MG/DL — HIGH (ref 70–99)
GLUCOSE BLDC GLUCOMTR-MCNC: 196 MG/DL — HIGH (ref 70–99)
GLUCOSE BLDC GLUCOMTR-MCNC: 86 MG/DL — SIGNIFICANT CHANGE UP (ref 70–99)
GLUCOSE BLDC GLUCOMTR-MCNC: 99 MG/DL — SIGNIFICANT CHANGE UP (ref 70–99)
GLUCOSE SERPL-MCNC: 109 MG/DL — HIGH (ref 70–99)
HCT VFR BLD CALC: 29.9 % — LOW (ref 39–50)
HGB BLD-MCNC: 9.4 G/DL — LOW (ref 13–17)
MCHC RBC-ENTMCNC: 30.1 PG — SIGNIFICANT CHANGE UP (ref 27–34)
MCHC RBC-ENTMCNC: 31.5 GM/DL — LOW (ref 32–36)
MCV RBC AUTO: 95.6 FL — SIGNIFICANT CHANGE UP (ref 80–100)
PLATELET # BLD AUTO: 458 K/UL — HIGH (ref 150–400)
POTASSIUM SERPL-MCNC: 4.8 MMOL/L — SIGNIFICANT CHANGE UP (ref 3.5–5.3)
POTASSIUM SERPL-SCNC: 4.8 MMOL/L — SIGNIFICANT CHANGE UP (ref 3.5–5.3)
RBC # BLD: 3.13 M/UL — LOW (ref 4.2–5.8)
RBC # FLD: 15.5 % — HIGH (ref 10.3–14.5)
SODIUM SERPL-SCNC: 137 MMOL/L — SIGNIFICANT CHANGE UP (ref 135–145)
TACROLIMUS SERPL-MCNC: 8.5 NG/ML — SIGNIFICANT CHANGE UP
WBC # BLD: 20.6 K/UL — HIGH (ref 3.8–10.5)
WBC # FLD AUTO: 20.6 K/UL — HIGH (ref 3.8–10.5)

## 2018-03-05 PROCEDURE — 93306 TTE W/DOPPLER COMPLETE: CPT | Mod: 26

## 2018-03-05 PROCEDURE — 99233 SBSQ HOSP IP/OBS HIGH 50: CPT

## 2018-03-05 PROCEDURE — 71045 X-RAY EXAM CHEST 1 VIEW: CPT | Mod: 26

## 2018-03-05 PROCEDURE — 99232 SBSQ HOSP IP/OBS MODERATE 35: CPT

## 2018-03-05 RX ORDER — CEFEPIME 1 G/1
2000 INJECTION, POWDER, FOR SOLUTION INTRAMUSCULAR; INTRAVENOUS ONCE
Qty: 0 | Refills: 0 | Status: DISCONTINUED | OUTPATIENT
Start: 2018-03-05 | End: 2018-03-05

## 2018-03-05 RX ORDER — CEFEPIME 1 G/1
2000 INJECTION, POWDER, FOR SOLUTION INTRAMUSCULAR; INTRAVENOUS EVERY 8 HOURS
Qty: 0 | Refills: 0 | Status: DISCONTINUED | OUTPATIENT
Start: 2018-03-05 | End: 2018-03-05

## 2018-03-05 RX ORDER — CEFEPIME 1 G/1
INJECTION, POWDER, FOR SOLUTION INTRAMUSCULAR; INTRAVENOUS
Qty: 0 | Refills: 0 | Status: DISCONTINUED | OUTPATIENT
Start: 2018-03-05 | End: 2018-03-16

## 2018-03-05 RX ORDER — CEFEPIME 1 G/1
INJECTION, POWDER, FOR SOLUTION INTRAMUSCULAR; INTRAVENOUS
Qty: 0 | Refills: 0 | Status: DISCONTINUED | OUTPATIENT
Start: 2018-03-05 | End: 2018-03-05

## 2018-03-05 RX ORDER — CEFEPIME 1 G/1
2000 INJECTION, POWDER, FOR SOLUTION INTRAMUSCULAR; INTRAVENOUS ONCE
Qty: 0 | Refills: 0 | Status: COMPLETED | OUTPATIENT
Start: 2018-03-05 | End: 2018-03-05

## 2018-03-05 RX ORDER — CEFEPIME 1 G/1
2000 INJECTION, POWDER, FOR SOLUTION INTRAMUSCULAR; INTRAVENOUS EVERY 8 HOURS
Qty: 0 | Refills: 0 | Status: DISCONTINUED | OUTPATIENT
Start: 2018-03-05 | End: 2018-03-16

## 2018-03-05 RX ORDER — POTASSIUM CHLORIDE 20 MEQ
20 PACKET (EA) ORAL ONCE
Qty: 0 | Refills: 0 | Status: COMPLETED | OUTPATIENT
Start: 2018-03-05 | End: 2018-03-05

## 2018-03-05 RX ORDER — MAGNESIUM SULFATE 500 MG/ML
2 VIAL (ML) INJECTION ONCE
Qty: 0 | Refills: 0 | Status: COMPLETED | OUTPATIENT
Start: 2018-03-05 | End: 2018-03-05

## 2018-03-05 RX ADMIN — Medication 500000 UNIT(S): at 12:01

## 2018-03-05 RX ADMIN — TACROLIMUS 6 MILLIGRAM(S): 5 CAPSULE ORAL at 07:58

## 2018-03-05 RX ADMIN — Medication 20 MILLIEQUIVALENT(S): at 05:50

## 2018-03-05 RX ADMIN — VALGANCICLOVIR 450 MILLIGRAM(S): 450 TABLET, FILM COATED ORAL at 07:58

## 2018-03-05 RX ADMIN — Medication 1 TABLET(S): at 12:01

## 2018-03-05 RX ADMIN — Medication 3 MILLILITER(S): at 23:32

## 2018-03-05 RX ADMIN — Medication 3 MILLILITER(S): at 12:01

## 2018-03-05 RX ADMIN — MYCOPHENOLATE MOFETIL 1000 MILLIGRAM(S): 250 CAPSULE ORAL at 07:58

## 2018-03-05 RX ADMIN — ENOXAPARIN SODIUM 80 MILLIGRAM(S): 100 INJECTION SUBCUTANEOUS at 17:27

## 2018-03-05 RX ADMIN — Medication 15 MILLIGRAM(S): at 07:59

## 2018-03-05 RX ADMIN — Medication 50 GRAM(S): at 05:42

## 2018-03-05 RX ADMIN — TACROLIMUS 6 MILLIGRAM(S): 5 CAPSULE ORAL at 20:05

## 2018-03-05 RX ADMIN — VALGANCICLOVIR 450 MILLIGRAM(S): 450 TABLET, FILM COATED ORAL at 20:05

## 2018-03-05 RX ADMIN — Medication 500000 UNIT(S): at 17:28

## 2018-03-05 RX ADMIN — CEFEPIME 100 MILLIGRAM(S): 1 INJECTION, POWDER, FOR SOLUTION INTRAMUSCULAR; INTRAVENOUS at 22:14

## 2018-03-05 RX ADMIN — Medication 500000 UNIT(S): at 05:48

## 2018-03-05 RX ADMIN — Medication 2: at 12:01

## 2018-03-05 RX ADMIN — MEROPENEM 100 MILLIGRAM(S): 1 INJECTION INTRAVENOUS at 14:12

## 2018-03-05 RX ADMIN — MEROPENEM 100 MILLIGRAM(S): 1 INJECTION INTRAVENOUS at 06:44

## 2018-03-05 RX ADMIN — Medication 3 MILLILITER(S): at 17:27

## 2018-03-05 RX ADMIN — CEFEPIME 100 MILLIGRAM(S): 1 INJECTION, POWDER, FOR SOLUTION INTRAMUSCULAR; INTRAVENOUS at 17:24

## 2018-03-05 RX ADMIN — MYCOPHENOLATE MOFETIL 1000 MILLIGRAM(S): 250 CAPSULE ORAL at 20:05

## 2018-03-05 RX ADMIN — PANTOPRAZOLE SODIUM 40 MILLIGRAM(S): 20 TABLET, DELAYED RELEASE ORAL at 05:48

## 2018-03-05 RX ADMIN — Medication 100 MILLIGRAM(S): at 22:14

## 2018-03-05 RX ADMIN — Medication 500000 UNIT(S): at 23:32

## 2018-03-05 RX ADMIN — Medication 3 MILLILITER(S): at 05:48

## 2018-03-05 RX ADMIN — Medication 15 MILLIGRAM(S): at 20:04

## 2018-03-05 RX ADMIN — ENOXAPARIN SODIUM 80 MILLIGRAM(S): 100 INJECTION SUBCUTANEOUS at 05:48

## 2018-03-05 RX ADMIN — PANTOPRAZOLE SODIUM 40 MILLIGRAM(S): 20 TABLET, DELAYED RELEASE ORAL at 17:27

## 2018-03-05 NOTE — PROGRESS NOTE ADULT - SUBJECTIVE AND OBJECTIVE BOX
Behavioral Cardiology Progress Note     HPI:  Mr. 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 Was found to have b/l IJ/subclavian thrombi. Extubated 2/27.     Behavioral Health Assessment:   Current stressors:   Hospitalization   s/p Heart transplant (2/23/18)      Support system/family support: Good support from family and close friend      Coping strategies: Talking with family and staff, watching TV, his pastora, talking to his granddaughter     Understanding of medical illness and treatment plan:  Understands reasons for this admission and treatment plan.  Good understanding of heart transplant education including need for lifelong immunosuppressant medication.  Benefits from frequent review and teach-back of all education.     MSE:  Awake and alert, oriented x3. Well related with good eye contact. Thought process goal directed. No abnormal thought content.  Mood "good".  Affect somewhat constricted.  Insight and judgment adequate.

## 2018-03-05 NOTE — PROGRESS NOTE ADULT - PROBLEM SELECTOR PLAN 1
sp  heart transplant  2/23  on rejection meds with HF following,  LVAD site   with VAC in place for drainage

## 2018-03-05 NOTE — PROGRESS NOTE ADULT - ASSESSMENT
Reports good mood and doing well overall.  Sleeping well ("much better"), reporting that since coming to [address], he has been sleeping through the night as "no one bothers you here".  Reportedly waking at night only to use the bathroom.  Appetite good.  Has been walking approximately 20 steps.  Only pain when coughing.  Reportedly gets "a little winded sometimes", which has improved recently.  Denied new stressors.        DX:  Systolic heart failure; r/o Adjustment disorder     Recommendations:   Behavioral Cardiology will continue to follow as needed    Rosio Dacosta M.A.

## 2018-03-05 NOTE — PROGRESS NOTE ADULT - ASSESSMENT
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     Upper extremity DVT  - CHERIE negative  - Continue with Lovenox 1mg/kg BID  - plan to repeat upper extremity venous duplex later this week   - elevate arms above heart level - given several pillows to place under Right arm  -  hold lovenox 12 hours prior to myocardial biopsy  - appreciate Vasc sx followup for R 3rd digit ischemia which is stable.     Will follow with you    Thanks    Saurabh Tavarez  [phone]

## 2018-03-05 NOTE — PROGRESS NOTE ADULT - SUBJECTIVE AND OBJECTIVE BOX
Interval History:  Multiple runs of PAT last evening/overnight. None since this AM. Has some oozing fluid over chest but not from sternotomy site.     Medications:  ALBUTerol/ipratropium for Nebulization 3 milliLiter(s) Nebulizer every 6 hours  docusate sodium 100 milliGRAM(s) Oral three times a day  enoxaparin Injectable 80 milliGRAM(s) SubCutaneous every 12 hours  insulin lispro (HumaLOG) corrective regimen sliding scale   SubCutaneous three times a day before meals  insulin lispro (HumaLOG) corrective regimen sliding scale   SubCutaneous at bedtime  meropenem  IVPB 1000 milliGRAM(s) IV Intermittent every 8 hours  mycophenolate mofetil 1000 milliGRAM(s) Oral <User Schedule>  nystatin    Suspension 702784 Unit(s) Oral every 6 hours  pantoprazole  Injectable 40 milliGRAM(s) IV Push two times a day  predniSONE   Tablet 15 milliGRAM(s) Oral <User Schedule>  tacrolimus 6 milliGRAM(s) Oral <User Schedule>  trimethoprim   80 mG/sulfamethoxazole 400 mG 1 Tablet(s) Oral daily  valGANciclovir 450 milliGRAM(s) Oral every 12 hours    Vitals:  T(C): 36.7 (18 @ 05:10), Max: 36.7 (18 @ 05:10)  HR: 102 (18 @ 03:07) (99 - 112)  BP: 122/85 (18 @ 03:07) (111/75 - 131/79)  BP(mean): 100 (18 @ 03:07) (86 - 100)  ABP: 110/84 (18 @ 16:00) (88/75 - 136/78)  ABP(mean): 94 (18 @ 16:00) (82 - 99)  RR: 18 (18 @ 03:07) (10 - 25)  SpO2: 96% (18 @ 03:07) (94% - 96%)  Wt(kg): --    Daily     Daily Weight in k.9 (05 Mar 2018 05:40) (77.4 Kg)        I&O's Summary    04 Mar 2018 07:01  -  05 Mar 2018 07:00  --------------------------------------------------------  IN: 1370 mL / OUT: 1600 mL / NET: -230 mL    05 Mar 2018 07:01  -  05 Mar 2018 13:50  --------------------------------------------------------  IN: 960 mL / OUT: 445 mL / NET: 515 mL        Physical Exam:  Appearance: No Acute Distress  HEENT: No JVD  Cardiovascular: Normal S1 S2, No murmurs/rubs/gallops  Respiratory: Clear to auscultation bilaterally  Gastrointestinal: Soft, Non-tender	  Skin: No cyanosis	  Neurologic: Non-focal  Extremities: No LE edema  Psychiatry: A & O x 3, Mood & affect appropriate    Labs:                        9.4    20.6  )-----------( 458      ( 05 Mar 2018 07:02 )             29.9     03-05    137  |  106  |  29<H>  ----------------------------<  109<H>  4.8   |  25  |  1.12    Ca    8.0<L>      05 Mar 2018 07:02  Mg     2.4     03-04    TPro  5.6<L>  /  Alb  3.1<L>  /  TBili  0.5  /  DBili  x   /  AST  21  /  ALT  14  /  AlkPhos  55  03-04    PT/INR - ( 04 Mar 2018 01:07 )   PT: 12.9 sec;   INR: 1.19 ratio         PTT - ( 04 Mar 2018 01:07 )  PTT:30.0 sec      Serum Pro-Brain Natriuretic Peptide: 8160 pg/mL ( @ 03:34)  Serum Pro-Brain Natriuretic Peptide: 3895 pg/mL ( @ 02:49)      TELEMETRY: PAT overnight. Currently Sinus tachy 100s-110s

## 2018-03-05 NOTE — PROGRESS NOTE ADULT - ASSESSMENT
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 Patient on broad federica-operative prophylaxis to Vancomycin/Meropenem/Fluconazole based upon his prior LVAD placement and prolonged hospitalization pre-transplant. Intermediate risk for CMV (seropositive); intermediate risk for toxoplasmosis (seropositive R). Improved aeration today at right lung base. He had Significant amount of sero-sanguinous drainage from old LVAD exit site  and vanco was added    - Plan:  Continue OI prophylaxis with Valganciclovir, Bactrim, Nystatin  Discontinue the Meropenem and start Cefepime 2 grams q 8hr to cover for pseudomonas isolate in his sputum  c/w Vancomycin 1 gram q 12hr to increase coverage of skin jose based upon exit site drainage  He will need a Vanco trough prior to the fourth dose  Hep C viral load in the HCV donor is pending- the donor genotype is 1a  HCV viral load 3k - patient will need therapy for HCV as an out patient  Blood cultures are NGTD   He had a wound vac placed over the LVAD exit site area    Gary Lujan MD  [phone]  After 5pm/weekends [phone]

## 2018-03-05 NOTE — PROGRESS NOTE ADULT - ATTENDING COMMENTS
s/p OHT 2.23.   1st biopsy normal hemodynamics. 1R/1A. AMR 1.   Off all inotropes.   bilat UE DVTs.   On Vanco and Isabel for serosang drainage from drive line.   Repeated episodes of WCT overnight. None today.   Meds: Lovanox, Bactrim, Valcyte 450 bid, Nystatin SandS,   Prograf 6 bid (level 8.8), Cellcept 1g bid, Pred 15 bid.   EK.20 SR 1deg AVB  TTE: 2.28: normal RV, decreased RV, normal LV.   Afebrile, . /-130/        CXR: raised R hemidiaghragm .   137  |  106  |  29<H>  ----------------------------<  109<H>  4.8   |  25  |  1.12    Ca    8.0<L>      05 Mar 2018 07:02  Mg     2.4     03-04    TPro  5.6<L>  /  Alb  3.1<L>  /  TBili  0.5  /  DBili  x   /  AST  21  /  ALT  14  /  AlkPhos  55  03-04                        9.4    20.6  )-----------( 458      ( 05 Mar 2018 07:02 )             29.9     Persistent elevation in WBC. Still on Abs.   Prograf level subtherapuetic but prograf increased yesterday.   Continue current immunosupressive regimen.  ID to see.   Marvin Campbell s/p OHT 2.23.   1st biopsy normal hemodynamics. 1R/1A. AMR 1.   Off all inotropes.   bilat UE DVTs.   On Vanco and Isabel for serosang drainage from drive line.   Repeated episodes of WCT overnight. None today.   Meds: Lovanox, Bactrim, Valcyte 450 bid, Nystatin SandS,   Prograf 6 bid (level 8.8), Cellcept 1g bid, Pred 15 bid.   EK.20 SR 1deg AVB  TTE: 2.28: normal RV, decreased RV, normal LV.   Afebrile, . /-130/  Awake alert orientated.  good bilat AE. no murmers. abdo soft. no LE edema.    CXR: raised R hemidiaghragm .   137  |  106  |  29<H>  ----------------------------<  109<H>  4.8   |  25  |  1.12    Ca    8.0<L>      05 Mar 2018 07:02  Mg     2.4     03-04    TPro  5.6<L>  /  Alb  3.1<L>  /  TBili  0.5  /  DBili  x   /  AST  21  /  ALT  14  /  AlkPhos  55  03-04                        9.4    20.6  )-----------( 458      ( 05 Mar 2018 07:02 )             29.9     Persistent elevation in WBC. Still on Abs.   Prograf level subtherapuetic but prograf increased yesterday.   Continue current immunosupressive regimen.  ID to see.   Marvin Campbell

## 2018-03-05 NOTE — PROGRESS NOTE ADULT - SUBJECTIVE AND OBJECTIVE BOX
Im doing ok    VITAL SIGNS    Telemetry:  nsr    Vital Signs Last 24 Hrs  T(C): 36.7 (18 @ 15:00), Max: 36.7 (18 @ 05:10)  T(F): 98 (18 @ 15:00), Max: 98 (18 @ 05:10)  HR: 103 (18 @ 15:00) (99 - 112)  BP: 133/86 (18 @ 15:00) (111/75 - 133/86)  RR: 18 (18 @ 15:00) (16 - 18)  SpO2: 94% (18 @ 15:00) (94% - 96%)                    @ 07:01  -   @ 07:00  --------------------------------------------------------  IN: 1370 mL / OUT: 1600 mL / NET: -230 mL     @ 07:01  -   @ 15:32  --------------------------------------------------------  IN: 960 mL / OUT: 445 mL / NET: 515 mL          Daily     Daily Weight in k.9 (05 Mar 2018 05:40)        CAPILLARY BLOOD GLUCOSE  100 (04 Mar 2018 19:00)      POCT Blood Glucose.: 196 mg/dL (05 Mar 2018 11:54)  POCT Blood Glucose.: 111 mg/dL (05 Mar 2018 07:54)  POCT Blood Glucose.: 100 mg/dL (04 Mar 2018 21:41)  POCT Blood Glucose.: 100 mg/dL (04 Mar 2018 19:24)                  Coumadin    [ ] YES          [ x ]      NO                                        PHYSICAL EXAM        Neurology: alert and oriented x 3, nonfocal, no gross deficits  CV : S1 S2 , RRR   Sternal Wound :  CDI , Stable  Lungs: cta  Abdomen: soft, nontender, nondistended, positive bowel sounds, last bowel movement +, vac to suction  :  voiding            Extremities:     trace edema - calve tenderness          ALBUTerol/ipratropium for Nebulization 3 milliLiter(s) Nebulizer every 6 hours  docusate sodium 100 milliGRAM(s) Oral three times a day  enoxaparin Injectable 80 milliGRAM(s) SubCutaneous every 12 hours  insulin lispro (HumaLOG) corrective regimen sliding scale   SubCutaneous three times a day before meals  insulin lispro (HumaLOG) corrective regimen sliding scale   SubCutaneous at bedtime  meropenem  IVPB 1000 milliGRAM(s) IV Intermittent every 8 hours  mycophenolate mofetil 1000 milliGRAM(s) Oral <User Schedule>  nystatin    Suspension 742289 Unit(s) Oral every 6 hours  pantoprazole  Injectable 40 milliGRAM(s) IV Push two times a day  predniSONE   Tablet 15 milliGRAM(s) Oral <User Schedule>  tacrolimus 6 milliGRAM(s) Oral <User Schedule>  trimethoprim   80 mG/sulfamethoxazole 400 mG 1 Tablet(s) Oral daily  valGANciclovir 450 milliGRAM(s) Oral every 12 hours                    Physical Therapy Rec:   Home  [  ]   Home w/ PT  [  ]  Rehab  [  ]  Discussed with Cardiothoracic Team at AM rounds.

## 2018-03-05 NOTE — PROGRESS NOTE ADULT - ASSESSMENT
67 yr old male s/p  Heart transplant 2/23/18 for HF   H; GI bleed, LVAD implant, afib, anemia AICD  Intra op acute rejection of graft and Intraop IABP placed  and removed POD 1 and received plasmaphoresis x 2  2/26 UE +RT IJ inominate SC,cephalic DVT and +lt SC DVT  3/1  cardiac bx low grade rejection  3/2 bronchoscopy   neg   3/4  trf too floor,  Imipenum for low grade fevers  3/5 Sputum + PSA, ID to see pt  Vac changed to RLQ

## 2018-03-05 NOTE — PROGRESS NOTE ADULT - SUBJECTIVE AND OBJECTIVE BOX
PAGER:  [phone]               [facility] [id number]              EMAIL [email or web address]   OFFICE [phone]                              ********VASCULAR MEDICINE & CARDIOLOGY PROGRESS NOTE********                        CC:  UE DVT    INTERVAL HISTORY:    on tele several short episodes of PAT.  Currently sinus 100-110.  Left arm is almost back to normal.  R upper extremity swelling improving, digit is stable.  Seen by Dr. Peralta yesterday.  On Lovenox for UE VTE    HISTORY OF PRESENT ILLNESS:  HPI:  67M PMH Chronic Systolic Heart Failure (ACC/AHA Stage D) due to NICMP s/p LVAD 6/12/17, GIB s/p Cautery (7/25/2017), known AV Malformations, Chronic Anemia due to numerous GIBs (off AC), A-Fib, VT s/p AICD. Pt admitted due to elevated LDH level of 646 for further evaluation and observation.  . Pt denies any chest pain, SOB, palpitations, N/D/V, abdominal pain, headaches, changes in vision, dizziness, pain or burning while urinating, swelling, numbness/tingling anywhere, rashes, fever, or recent travel. (01 Feb 2018 19:36)          Allergies    No Known Allergies    Intolerances    	  MEDICATIONS  (STANDING):  ALBUTerol/ipratropium for Nebulization 3 milliLiter(s) Nebulizer every 6 hours  docusate sodium 100 milliGRAM(s) Oral three times a day  enoxaparin Injectable 80 milliGRAM(s) SubCutaneous every 12 hours  insulin lispro (HumaLOG) corrective regimen sliding scale   SubCutaneous three times a day before meals  insulin lispro (HumaLOG) corrective regimen sliding scale   SubCutaneous at bedtime  meropenem  IVPB 1000 milliGRAM(s) IV Intermittent every 8 hours  mycophenolate mofetil 1000 milliGRAM(s) Oral <User Schedule>  nystatin    Suspension 489998 Unit(s) Oral every 6 hours  pantoprazole  Injectable 40 milliGRAM(s) IV Push two times a day  predniSONE   Tablet 15 milliGRAM(s) Oral <User Schedule>  tacrolimus 6 milliGRAM(s) Oral <User Schedule>  trimethoprim   80 mG/sulfamethoxazole 400 mG 1 Tablet(s) Oral daily  valGANciclovir 450 milliGRAM(s) Oral every 12 hours    MEDICATIONS  (PRN):    PAST MEDICAL & SURGICAL HISTORY:  GIB (gastrointestinal bleeding)  Ventricular fibrillation: s/p AICD  PAF (paroxysmal atrial fibrillation): on xarelto  Non-Ischemic Cardiomyopathy  SVT (Supraventricular Tachycardia)  HTN  CHF (Congestive Heart Failure)  LVAD (left ventricular assist device) present  Status post left hip replacement  History of Prior Ablation Treatment: for afib  AICD (Automatic Cardioverter/Defibrillator) Present: [employer] with 1 [employer] lead4/1/09- explanted and replaced with [employer] 2 leads on 9/2/09      FAMILY HISTORY:  No pertinent family history in first degree relatives      SOCIAL HISTORY:  unchanged    REVIEW OF SYSTEMS:  CONSTITUTIONAL: No fever, weight loss, or fatigue  EYES: No eye pain, visual disturbances, or discharge  ENMT:  No difficulty hearing, tinnitus, vertigo; No sinus or throat pain  NECK: No pain or stiffness  RESPIRATORY: No cough, wheezing, chills or hemoptysis; No Shortness of Breath  CARDIOVASCULAR: No chest pain, palpitations, passing out, dizziness, or leg swelling  GASTROINTESTINAL: No abdominal or epigastric pain. No nausea, vomiting, or hematemesis; No diarrhea or constipation. No melena or hematochezia.  GENITOURINARY: No dysuria, frequency, hematuria, or incontinence  NEUROLOGICAL: No headaches, memory loss, loss of strength, numbness, or tremors  SKIN: No itching, burning, rashes, or lesions   LYMPH Nodes: No enlarged glands  ENDOCRINE: No heat or cold intolerance; No hair loss  MUSCULOSKELETAL: No joint pain or swelling; No muscle, back, or extremity pain  PSYCHIATRIC: No depression, anxiety, mood swings, or difficulty sleeping  HEME/LYMPH: No easy bruising, or bleeding gums  ALLERY AND IMMUNOLOGIC: No hives or eczema	    [x ] All others negative	  [ ] Unable to obtain    ICU Vital Signs Last 24 Hrs  T(C): 36.7 (05 Mar 2018 05:10), Max: 36.7 (05 Mar 2018 05:10)  T(F): 98 (05 Mar 2018 05:10), Max: 98 (05 Mar 2018 05:10)  HR: 102 (05 Mar 2018 03:07) (98 - 112)  BP: 122/85 (05 Mar 2018 03:07) (111/75 - 131/79)  BP(mean): 100 (05 Mar 2018 03:07) (86 - 100)  ABP: 110/84 (04 Mar 2018 16:00) (88/75 - 136/78)  ABP(mean): 94 (04 Mar 2018 16:00) (82 - 99)  RR: 18 (05 Mar 2018 03:07) (10 - 25)  SpO2: 96% (05 Mar 2018 03:07) (94% - 99%)      Appearance: Normal, on high flow oxgyen  HEENT:   Normal oral mucosa, PERRL, EOMI	  Lymphatic: No lymphadenopathy  Cardiovascular: Normal S1 S2 tachycardia.   Respiratory: clear	  Psychiatry: A & O x 3, Mood & affect appropriate  Gastrointestinal:  Soft, Non-tender, + BS	  Skin: No rashes, No ecchymoses, No cyanosis	  Neurologic: Non-focal  Extremities: RUE edema.  3rd digit R hand ischemia, motion and sensation intact.                           9.4    20.6  )-----------( 458      ( 05 Mar 2018 07:02 )             29.9   03-05    137  |  106  |  29<H>  ----------------------------<  109<H>  4.8   |  25  |  1.12    Ca    8.0<L>      05 Mar 2018 07:02  Mg     2.4     03-04    TPro  5.6<L>  /  Alb  3.1<L>  /  TBili  0.5  /  DBili  x   /  AST  21  /  ALT  14  /  AlkPhos  55  03-04

## 2018-03-05 NOTE — PROGRESS NOTE ADULT - SUBJECTIVE AND OBJECTIVE BOX
INFECTIOUS DISEASES FOLLOW UP-- Sujey Lujan  [phone]    This is a follow up note for this  67yMale who is s/p heart transplant on 2/23, transferred to the step down area over the weekend      ROS:  CONSTITUTIONAL:  No fever, good appetite  CARDIOVASCULAR:  No chest pain or palpitations  RESPIRATORY:  No dyspnea  GASTROINTESTINAL:  No nausea, vomiting, diarrhea, or abdominal pain  GENITOURINARY:  No dysuria  NEUROLOGIC:  No headache,     Allergies    No Known Allergies    Intolerances        ANTIBIOTICS/RELEVANT:  antimicrobials  meropenem  IVPB 1000 milliGRAM(s) IV Intermittent every 8 hours  nystatin    Suspension [id number] Unit(s) Oral every 6 hours  trimethoprim   80 mG/sulfamethoxazole 400 mG 1 Tablet(s) Oral daily  valGANciclovir 450 milliGRAM(s) Oral every 12 hours    immunologic:  mycophenolate mofetil 1000 milliGRAM(s) Oral <User Schedule>  tacrolimus 6 milliGRAM(s) Oral <User Schedule>    OTHER:  ALBUTerol/ipratropium for Nebulization 3 milliLiter(s) Nebulizer every 6 hours  docusate sodium 100 milliGRAM(s) Oral three times a day  enoxaparin Injectable 80 milliGRAM(s) SubCutaneous every 12 hours  insulin lispro (HumaLOG) corrective regimen sliding scale   SubCutaneous three times a day before meals  insulin lispro (HumaLOG) corrective regimen sliding scale   SubCutaneous at bedtime  pantoprazole  Injectable 40 milliGRAM(s) IV Push two times a day  predniSONE   Tablet 15 milliGRAM(s) Oral <User Schedule>      Objective:  Vital Signs Last 24 Hrs  T(C): 36.7 (05 Mar 2018 15:00), Max: 36.7 (05 Mar 2018 05:10)  T(F): 98 (05 Mar 2018 15:00), Max: 98 (05 Mar 2018 05:10)  HR: 103 (05 Mar 2018 15:00) (100 - 112)  BP: 133/86 (05 Mar 2018 15:00) (111/75 - 133/86)  BP(mean): 100 (05 Mar 2018 03:07) (86 - 100)  RR: 18 (05 Mar 2018 15:00) (18 - 18)  SpO2: 94% (05 Mar 2018 15:00) (94% - 96%)    PHYSICAL EXAM:  Constitutional:no acute distress, sitting in a chair  Eyes:WALT, EOMI, facial edema remains  Ear/Nose/Throat: no oral lesions, 	  Respiratory: clear BL, decreased BS at right base but lower area than prior exams  wound vac overlying the old LVAD exit site  Cardiovascular: S1S2, irreg  Gastrointestinal:soft, (+) BS, no tenderness  Extremities:no e/e/c, upper extremities bilat edema  No Lymphadenopathy  IV sites not inflammed.    LABS:                        9.4    20.6  )-----------( 458      ( 05 Mar 2018 07:02 )             29.9     03-05    137  |  106  |  29<H>  ----------------------------<  109<H>  4.8   |  25  |  1.12    Ca    8.0<L>      05 Mar 2018 07:02  Mg     2.4     03-04    TPro  5.6<L>  /  Alb  3.1<L>  /  TBili  0.5  /  DBili  x   /  AST  21  /  ALT  14  /  AlkPhos  55  03-04    PT/INR - ( 04 Mar 2018 01:07 )   PT: 12.9 sec;   INR: 1.19 ratio         PTT - ( 04 Mar 2018 01:07 )  PTT:30.0 sec      MICROBIOLOGY:            RECENT CULTURES:  03-02 @ 16:32  .Sputum Sputum  Pseudomonas aeruginosa (Carbapenem Resistant)  Pseudomonas aeruginosa (Carbapenem Resistant)  AKMILA    Few Pseudomonas aeruginosa (Carbapenem Resistant)  Normal Respiratory Heaven present  --  03-02 @ 06:41  .Blood Blood-Peripheral  --  --  --    No growth to date.  --  03-01 @ 19:48  .Blood Blood  --  --  --    No growth to date.  --      RADIOLOGY & ADDITIONAL STUDIES:    < from: Xray Chest 1 View- PORTABLE-Routine (03.05.18 @ 06:07) >          INTERPRETATION:  CLINICAL INFORMATION: Postcardiac surgery.    A single portable view of the chest was obtained.     Comparison: 3/4/2018.     Themediastinal cardiac silhouette is unremarkable. Sternotomy.    Elevated right hemidiaphragm. Mild right basilar atelectasis. The lungs   are otherwise clear. No change from prior    No acute osseous finding.     IMPRESSION:    < end of copied text >  < from: Xray Chest 1 View- PORTABLE-Routine (03.05.18 @ 06:07) >          INTERPRETATION:  CLINICAL INFORMATION: Postcardiac surgery.    A single portable view of the chest was obtained.     Comparison: 3/4/2018.     Themediastinal cardiac silhouette is unremarkable. Sternotomy.    Elevated right hemidiaphragm. Mild right basilar atelectasis. The lungs   are otherwise clear. No change from prior    No acute osseous finding.     IMPRESSION:    < end of copied text >

## 2018-03-05 NOTE — CHART NOTE - NSCHARTNOTEFT_GEN_A_CORE
Source: Patient [ x ]    Family [ ]     other [ x ] RN, Comprehensive chart review     Pt seen for nutrition follow up. Reports good appetite and eating 100% of meals, as well as Ensure Enlive 2 times/day. Noted ordered for 3 cans/day. No GI distress.   Pt able to teach-back medical nutrition therapy for heart health, food safety for transplant recipients, and food/drug interactions. Pt amenable to review/reinforcement and states he is going to continue with strict compliance to therapeutic diet once he is discharged.     Pt seen for nutrition follow up. 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 Now s/p first endomyocardial biopsy- preliminary results consistent with AMR but possibly low grade. Noted per ID pt with right lower lobe atelectasis vs early PNA; s/p bronchoscopy- no mucus plug and atelectasis improving.    Diet : Regular, Ensure Enlive 3 cans/day.      Admission Weight: 78.9kg  Current Weight: 81.9kg  Weight fluctuations noted, likely due to fluid shifts. Pt with 2+generalized and 3+rihgt arm/wrist/hand.    Pertinent Medications: MEDICATIONS  (STANDING):  ALBUTerol/ipratropium for Nebulization 3 milliLiter(s) Nebulizer every 6 hours  docusate sodium 100 milliGRAM(s) Oral three times a day  enoxaparin Injectable 80 milliGRAM(s) SubCutaneous every 12 hours  insulin lispro (HumaLOG) corrective regimen sliding scale   SubCutaneous three times a day before meals  insulin lispro (HumaLOG) corrective regimen sliding scale   SubCutaneous at bedtime  meropenem  IVPB 1000 milliGRAM(s) IV Intermittent every 8 hours  mycophenolate mofetil 1000 milliGRAM(s) Oral <User Schedule>  nystatin    Suspension [id number] Unit(s) Oral every 6 hours  pantoprazole  Injectable 40 milliGRAM(s) IV Push two times a day  predniSONE   Tablet 15 milliGRAM(s) Oral <User Schedule>  tacrolimus 6 milliGRAM(s) Oral <User Schedule>  trimethoprim   80 mG/sulfamethoxazole 400 mG 1 Tablet(s) Oral daily  valGANciclovir 450 milliGRAM(s) Oral every 12 hours    MEDICATIONS  (PRN):    Pertinent Labs:    Complete Blood Count (03.05.18 @ 07:02)    Hemoglobin: 9.4 g/dL - low    Hematocrit: 29.9 % - low  Basic Metabolic Panel - STAT (03.05.18 @ 07:02)    Sodium, Serum: 137 mmol/L    Potassium, Serum: 4.8 mmol/L    Chloride, Serum: 106 mmol/L    Carbon Dioxide, Serum: 25 mmol/L    Anion Gap, Serum: 6 mmol/L    Blood Urea Nitrogen, Serum: 29 mg/dL - high    Creatinine, Serum: 1.12 mg/dL    Glucose, Serum: 109 mg/dL - high    Calcium, Total Serum: 8.0 mg/dL - low  Comprehensive Metabolic Panel (03.04.18 @ 01:07)    Protein Total, Serum: 5.6 g/dL    Albumin, Serum: 3.1 g/dL - low    Bilirubin Total, Serum: 0.5 mg/dL    Alkaline Phosphatase, Serum: 55 U/L    Aspartate Aminotransferase (AST/SGOT): 21 U/L    Alanine Aminotransferase (ALT/SGPT): 14 U/L RC    Point of Care Testing BG: (3/5)111, (3/4)100-166      Skin: No pressure ulcers noted.     Estimated Needs:   [ x ] no change since previous assessment  [ ] recalculated:       Previous Nutrition Diagnosis:   Limited adherence to nutrition related recommendations being addressed with continued diet education.   Inadequate oral intake and Increased Nutrient Needs being addressed with PO diet and supplements.      New Nutrition Diagnosis: [ x ] not applicable      Interventions:   1. Encourage pt to maintain good PO intake with all meals.  2. Provide food preferences within therapeutic diet when requested.   3. Encourage use of alternate menu options as needed.  4. Reviewed heart healthy diet guidelines, food safety, and food/drug interactions.        Monitoring and Evaluation:     [ x ] PO intake [ x ] Tolerance to diet prescription [ x ] weights [ x ] follow up per protocol    [ ] other: Source: Patient [ x ]    Family [ ]     other [ x ] RN, Comprehensive chart review     Pt seen for nutrition follow up. Reports good appetite and eating 100% of meals, as well as Ensure Enlive 2 times/day. Noted ordered for 3 cans/day. No GI distress.   Pt able to teach-back medical nutrition therapy for heart health, food safety for transplant recipients, and food/drug interactions. Pt amenable to review/reinforcement and states he is going to continue with strict compliance to therapeutic diet once he is discharged.     Pt seen for nutrition follow up. 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 Now s/p first endomyocardial biopsy- preliminary results consistent with AMR but possibly low grade. Noted per ID pt with right lower lobe atelectasis vs early PNA; s/p bronchoscopy- no mucus plug and atelectasis improving.    Diet : Regular, Ensure Enlive 3 cans/day.      Admission Weight: 78.9kg  Current Weight: 81.9kg  Weight fluctuations noted, likely due to fluid shifts. Pt with 2+generalized and 3+rihgt arm/wrist/hand.    Pertinent Medications: MEDICATIONS  (STANDING):  ALBUTerol/ipratropium for Nebulization 3 milliLiter(s) Nebulizer every 6 hours  docusate sodium 100 milliGRAM(s) Oral three times a day  enoxaparin Injectable 80 milliGRAM(s) SubCutaneous every 12 hours  insulin lispro (HumaLOG) corrective regimen sliding scale   SubCutaneous three times a day before meals  insulin lispro (HumaLOG) corrective regimen sliding scale   SubCutaneous at bedtime  meropenem  IVPB 1000 milliGRAM(s) IV Intermittent every 8 hours  mycophenolate mofetil 1000 milliGRAM(s) Oral <User Schedule>  nystatin    Suspension 654932 Unit(s) Oral every 6 hours  pantoprazole  Injectable 40 milliGRAM(s) IV Push two times a day  predniSONE   Tablet 15 milliGRAM(s) Oral <User Schedule>  tacrolimus 6 milliGRAM(s) Oral <User Schedule>  trimethoprim   80 mG/sulfamethoxazole 400 mG 1 Tablet(s) Oral daily  valGANciclovir 450 milliGRAM(s) Oral every 12 hours    MEDICATIONS  (PRN):    Pertinent Labs:    Complete Blood Count (03.05.18 @ 07:02)    Hemoglobin: 9.4 g/dL - low    Hematocrit: 29.9 % - low  Basic Metabolic Panel - STAT (03.05.18 @ 07:02)    Sodium, Serum: 137 mmol/L    Potassium, Serum: 4.8 mmol/L    Chloride, Serum: 106 mmol/L    Carbon Dioxide, Serum: 25 mmol/L    Anion Gap, Serum: 6 mmol/L    Blood Urea Nitrogen, Serum: 29 mg/dL - high    Creatinine, Serum: 1.12 mg/dL    Glucose, Serum: 109 mg/dL - high    Calcium, Total Serum: 8.0 mg/dL - low  Comprehensive Metabolic Panel (03.04.18 @ 01:07)    Protein Total, Serum: 5.6 g/dL    Albumin, Serum: 3.1 g/dL - low    Bilirubin Total, Serum: 0.5 mg/dL    Alkaline Phosphatase, Serum: 55 U/L    Aspartate Aminotransferase (AST/SGOT): 21 U/L    Alanine Aminotransferase (ALT/SGPT): 14 U/L RC    Point of Care Testing BG: (3/5)111, (3/4)100-166      Skin: No pressure ulcers noted.     Estimated Needs:   [ x ] no change since previous assessment  [ ] recalculated:       Previous Nutrition Diagnosis:   Limited adherence to nutrition related recommendations being addressed with continued diet education.   Inadequate oral intake and Increased Nutrient Needs being addressed with PO diet and supplements.      New Nutrition Diagnosis: [ x ] not applicable      Interventions:   1. Encourage pt to maintain good PO intake with all meals.  2. Provide food preferences within therapeutic diet when requested.   3. Encourage use of alternate menu options as needed.  4. Reviewed heart healthy diet guidelines, food safety, and food/drug interactions.   5. Recommend change diet to regular, low fiber, Ensure Enlive 2 cans/day       Monitoring and Evaluation:     [ x ] PO intake [ x ] Tolerance to diet prescription [ x ] weights [ x ] follow up per protocol    [ ] other:

## 2018-03-05 NOTE — PROGRESS NOTE ADULT - ASSESSMENT
Mr. 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 Underwent biopsy which showed CMR 1R, AMR 1 (no cellular injury). Repeat DSA negative.     Immunosuppression prophylaxis:  Tacrolimus - started on prograf 2/25; received 5 mg last evening at 20:00; tac level 9.9 (reduced from 13); give 1 mg and increase to 6 mg q12; please check levels at 7:30 am (stat)  Continue CellCept 1000 mg PO BID  Steroids - continue taper; on prednisone 15 mg PO q12h;   	  Antimicrobial prophylaxis:  Intermediate risk CMV - on oral valganciclovir 450 mg q12  Intermediate risk Toxo - on Bactrim PPx (daily dose)  PCP - on Bactrim 1 SS tab daily     Graft function:  LV function improved; persistent mild RV dysfunction however excellent HD off inotropes  underwent biospy 3/1; positive for grade 1R/AMR 1; DSA negative    Hypoxia:  likely d/t atelectasis; improving; s/p bronch with no mucous plug    Other prophylaxis:  Protonix 40 mg PO daily for GI prophylaxis while on steroids.    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

## 2018-03-06 LAB
ANION GAP SERPL CALC-SCNC: 11 MMOL/L — SIGNIFICANT CHANGE UP (ref 5–17)
BUN SERPL-MCNC: 26 MG/DL — HIGH (ref 7–23)
CALCIUM SERPL-MCNC: 8.4 MG/DL — SIGNIFICANT CHANGE UP (ref 8.4–10.5)
CHLORIDE SERPL-SCNC: 106 MMOL/L — SIGNIFICANT CHANGE UP (ref 96–108)
CO2 SERPL-SCNC: 23 MMOL/L — SIGNIFICANT CHANGE UP (ref 22–31)
CREAT SERPL-MCNC: 1.11 MG/DL — SIGNIFICANT CHANGE UP (ref 0.5–1.3)
CULTURE RESULTS: SIGNIFICANT CHANGE UP
GLUCOSE BLDC GLUCOMTR-MCNC: 108 MG/DL — HIGH (ref 70–99)
GLUCOSE BLDC GLUCOMTR-MCNC: 111 MG/DL — HIGH (ref 70–99)
GLUCOSE BLDC GLUCOMTR-MCNC: 116 MG/DL — HIGH (ref 70–99)
GLUCOSE BLDC GLUCOMTR-MCNC: 98 MG/DL — SIGNIFICANT CHANGE UP (ref 70–99)
GLUCOSE SERPL-MCNC: 100 MG/DL — HIGH (ref 70–99)
HCT VFR BLD CALC: 28.9 % — LOW (ref 39–50)
HGB BLD-MCNC: 9.2 G/DL — LOW (ref 13–17)
MCHC RBC-ENTMCNC: 30.8 PG — SIGNIFICANT CHANGE UP (ref 27–34)
MCHC RBC-ENTMCNC: 32 GM/DL — SIGNIFICANT CHANGE UP (ref 32–36)
MCV RBC AUTO: 96.1 FL — SIGNIFICANT CHANGE UP (ref 80–100)
PLATELET # BLD AUTO: 481 K/UL — HIGH (ref 150–400)
POTASSIUM SERPL-MCNC: 5.2 MMOL/L — SIGNIFICANT CHANGE UP (ref 3.5–5.3)
POTASSIUM SERPL-SCNC: 5.2 MMOL/L — SIGNIFICANT CHANGE UP (ref 3.5–5.3)
RBC # BLD: 3 M/UL — LOW (ref 4.2–5.8)
RBC # FLD: 15.7 % — HIGH (ref 10.3–14.5)
SODIUM SERPL-SCNC: 140 MMOL/L — SIGNIFICANT CHANGE UP (ref 135–145)
SPECIMEN SOURCE: SIGNIFICANT CHANGE UP
TACROLIMUS SERPL-MCNC: 8.8 NG/ML — SIGNIFICANT CHANGE UP
WBC # BLD: 24 K/UL — HIGH (ref 3.8–10.5)
WBC # FLD AUTO: 24 K/UL — HIGH (ref 3.8–10.5)

## 2018-03-06 PROCEDURE — 99233 SBSQ HOSP IP/OBS HIGH 50: CPT

## 2018-03-06 PROCEDURE — 99232 SBSQ HOSP IP/OBS MODERATE 35: CPT

## 2018-03-06 RX ORDER — TACROLIMUS 5 MG/1
7 CAPSULE ORAL
Qty: 0 | Refills: 0 | Status: DISCONTINUED | OUTPATIENT
Start: 2018-03-06 | End: 2018-03-08

## 2018-03-06 RX ORDER — ACETAMINOPHEN 500 MG
650 TABLET ORAL EVERY 6 HOURS
Qty: 0 | Refills: 0 | Status: DISCONTINUED | OUTPATIENT
Start: 2018-03-06 | End: 2018-04-10

## 2018-03-06 RX ADMIN — TACROLIMUS 7 MILLIGRAM(S): 5 CAPSULE ORAL at 20:21

## 2018-03-06 RX ADMIN — Medication 15 MILLIGRAM(S): at 08:05

## 2018-03-06 RX ADMIN — Medication 3 MILLILITER(S): at 06:07

## 2018-03-06 RX ADMIN — Medication 100 MILLIGRAM(S): at 21:06

## 2018-03-06 RX ADMIN — Medication 1 TABLET(S): at 11:57

## 2018-03-06 RX ADMIN — TACROLIMUS 6 MILLIGRAM(S): 5 CAPSULE ORAL at 08:05

## 2018-03-06 RX ADMIN — MYCOPHENOLATE MOFETIL 1000 MILLIGRAM(S): 250 CAPSULE ORAL at 08:05

## 2018-03-06 RX ADMIN — Medication 500000 UNIT(S): at 17:15

## 2018-03-06 RX ADMIN — PANTOPRAZOLE SODIUM 40 MILLIGRAM(S): 20 TABLET, DELAYED RELEASE ORAL at 06:08

## 2018-03-06 RX ADMIN — Medication 500000 UNIT(S): at 11:58

## 2018-03-06 RX ADMIN — Medication 500000 UNIT(S): at 06:07

## 2018-03-06 RX ADMIN — Medication 3 MILLILITER(S): at 11:58

## 2018-03-06 RX ADMIN — CEFEPIME 100 MILLIGRAM(S): 1 INJECTION, POWDER, FOR SOLUTION INTRAMUSCULAR; INTRAVENOUS at 06:07

## 2018-03-06 RX ADMIN — CEFEPIME 100 MILLIGRAM(S): 1 INJECTION, POWDER, FOR SOLUTION INTRAMUSCULAR; INTRAVENOUS at 14:15

## 2018-03-06 RX ADMIN — PANTOPRAZOLE SODIUM 40 MILLIGRAM(S): 20 TABLET, DELAYED RELEASE ORAL at 17:15

## 2018-03-06 RX ADMIN — ENOXAPARIN SODIUM 80 MILLIGRAM(S): 100 INJECTION SUBCUTANEOUS at 17:15

## 2018-03-06 RX ADMIN — Medication 500000 UNIT(S): at 23:13

## 2018-03-06 RX ADMIN — MYCOPHENOLATE MOFETIL 1000 MILLIGRAM(S): 250 CAPSULE ORAL at 20:20

## 2018-03-06 RX ADMIN — VALGANCICLOVIR 450 MILLIGRAM(S): 450 TABLET, FILM COATED ORAL at 08:04

## 2018-03-06 RX ADMIN — VALGANCICLOVIR 450 MILLIGRAM(S): 450 TABLET, FILM COATED ORAL at 20:20

## 2018-03-06 RX ADMIN — Medication 3 MILLILITER(S): at 17:15

## 2018-03-06 RX ADMIN — Medication 3 MILLILITER(S): at 23:13

## 2018-03-06 RX ADMIN — Medication 15 MILLIGRAM(S): at 20:20

## 2018-03-06 RX ADMIN — CEFEPIME 100 MILLIGRAM(S): 1 INJECTION, POWDER, FOR SOLUTION INTRAMUSCULAR; INTRAVENOUS at 21:06

## 2018-03-06 RX ADMIN — ENOXAPARIN SODIUM 80 MILLIGRAM(S): 100 INJECTION SUBCUTANEOUS at 06:07

## 2018-03-06 NOTE — PROGRESS NOTE ADULT - SUBJECTIVE AND OBJECTIVE BOX
PAGER:  [phone]               [facility] [address]              EMAIL [email or web address]   OFFICE [phone]                              ********VASCULAR MEDICINE & CARDIOLOGY PROGRESS NOTE********                        CC:  UE DVT    INTERVAL HISTORY:    Left arm is back to normal.  R upper extremity swelling improving, digit is stable.  Remains on Lovenox for UE VTE.  No new facial swelling.     HISTORY OF PRESENT ILLNESS:  HPI:  67M PMH Chronic Systolic Heart Failure (ACC/AHA Stage D) due to NICMP s/p LVAD 6/12/17, GIB s/p Cautery (7/25/2017), known AV Malformations, Chronic Anemia due to numerous GIBs (off AC), A-Fib, VT s/p AICD. Pt admitted due to elevated LDH level of 646 for further evaluation and observation.  . Pt denies any chest pain, SOB, palpitations, N/D/V, abdominal pain, headaches, changes in vision, dizziness, pain or burning while urinating, swelling, numbness/tingling anywhere, rashes, fever, or recent travel. (01 Feb 2018 19:36)          Allergies    No Known Allergies    Intolerances  MEDICATIONS  (STANDING):  ALBUTerol/ipratropium for Nebulization 3 milliLiter(s) Nebulizer every 6 hours  cefepime  IVPB      cefepime  IVPB 2000 milliGRAM(s) IV Intermittent every 8 hours  docusate sodium 100 milliGRAM(s) Oral three times a day  enoxaparin Injectable 80 milliGRAM(s) SubCutaneous every 12 hours  insulin lispro (HumaLOG) corrective regimen sliding scale   SubCutaneous three times a day before meals  insulin lispro (HumaLOG) corrective regimen sliding scale   SubCutaneous at bedtime  mycophenolate mofetil 1000 milliGRAM(s) Oral <User Schedule>  nystatin    Suspension 717519 Unit(s) Oral every 6 hours  pantoprazole  Injectable 40 milliGRAM(s) IV Push two times a day  predniSONE   Tablet 15 milliGRAM(s) Oral <User Schedule>  tacrolimus 6 milliGRAM(s) Oral <User Schedule>  trimethoprim   80 mG/sulfamethoxazole 400 mG 1 Tablet(s) Oral daily  valGANciclovir 450 milliGRAM(s) Oral every 12 hours    MEDICATIONS  (PRN):  acetaminophen   Tablet. 650 milliGRAM(s) Oral every 6 hours PRN Mild Pain (1 - 3)      PAST MEDICAL & SURGICAL HISTORY:  GIB (gastrointestinal bleeding)  Ventricular fibrillation: s/p AICD  PAF (paroxysmal atrial fibrillation): on xarelto  Non-Ischemic Cardiomyopathy  SVT (Supraventricular Tachycardia)  HTN  CHF (Congestive Heart Failure)  LVAD (left ventricular assist device) present  Status post left hip replacement  History of Prior Ablation Treatment: for afib  AICD (Automatic Cardioverter/Defibrillator) Present: [employer] with 1 [employer] lead4/1/09- explanted and replaced with [employer] 2 leads on 9/2/09      FAMILY HISTORY:  No pertinent family history in first degree relatives      SOCIAL HISTORY:  unchanged    REVIEW OF SYSTEMS:  CONSTITUTIONAL: No fever, weight loss, or fatigue  EYES: No eye pain, visual disturbances, or discharge  ENMT:  No difficulty hearing, tinnitus, vertigo; No sinus or throat pain  NECK: No pain or stiffness  RESPIRATORY: No cough, wheezing, chills or hemoptysis; No Shortness of Breath  CARDIOVASCULAR: No chest pain, palpitations, passing out, dizziness, or leg swelling  GASTROINTESTINAL: No abdominal or epigastric pain. No nausea, vomiting, or hematemesis; No diarrhea or constipation. No melena or hematochezia.  GENITOURINARY: No dysuria, frequency, hematuria, or incontinence  NEUROLOGICAL: No headaches, memory loss, loss of strength, numbness, or tremors  SKIN: No itching, burning, rashes, or lesions   LYMPH Nodes: No enlarged glands  ENDOCRINE: No heat or cold intolerance; No hair loss  MUSCULOSKELETAL: No joint pain or swelling; No muscle, back, or extremity pain  PSYCHIATRIC: No depression, anxiety, mood swings, or difficulty sleeping  HEME/LYMPH: No easy bruising, or bleeding gums  ALLERY AND IMMUNOLOGIC: No hives or eczema	    [x ] All others negative	  [ ] Unable to obtain    ICU Vital Signs Last 24 Hrs  T(C): 36.7 (06 Mar 2018 03:10), Max: 36.8 (05 Mar 2018 19:00)  T(F): 98.1 (06 Mar 2018 03:10), Max: 98.2 (05 Mar 2018 19:00)  HR: 98 (06 Mar 2018 03:10) (98 - 112)  BP: 120/80 (06 Mar 2018 03:10) (120/80 - 159/77)  BP(mean): 96 (06 Mar 2018 03:10) (94 - 96)  ABP: --  ABP(mean): --  RR: 18 (06 Mar 2018 03:10) (18 - 18)  SpO2: 96% (06 Mar 2018 03:10) (94% - 96%)      Appearance: Normal, on high flow oxgyen  HEENT:   Normal oral mucosa, PERRL, EOMI	  Lymphatic: No lymphadenopathy  Cardiovascular: Normal S1 S2 tachycardia.   Respiratory: clear	  Psychiatry: A & O x 3, Mood & affect appropriate  Gastrointestinal:  Soft, Non-tender, + BS	  Skin: No rashes, No ecchymoses, No cyanosis	  Neurologic: Non-focal  Extremities: RUE edema.  3rd digit R hand ischemia, motion and sensation intact.                           9.2    24.0  )-----------( 481      ( 06 Mar 2018 07:37 )             28.9   03-06    140  |  106  |  26<H>  ----------------------------<  100<H>  5.2   |  23  |  1.11    Ca    8.4      06 Mar 2018 07:37

## 2018-03-06 NOTE — PROGRESS NOTE ADULT - ATTENDING COMMENTS
Ambulating.   Changed to cefipime for pseudomonas in sputum. ? Vanco initiated for drive line infection.   Prograf 6 bid (level 8.8). Pred 15 bid, Cellcept 1g bid. Bactrim, Valcyte, Nystatin  afebrile. no further ectopy. 120/-159/, HR     TTE: LA 3.6, LVEDD 3.9, LVEF 55-60, decreased RV sys function, some improvement. RVSP 22    140  |  106  |  26<H>  ----------------------------<  100<H>  5.2   |  23  |  1.11    Ca    8.4      06 Mar 2018 07:37                          9.2    (20.6) 24.0  )-----------( 481      ( 06 Mar 2018 07:37 )             28.9     Clinically stable post transplant. Elevation in WBC with no fever.   Increase prograf 7 bid.   Antibiotics as per GRABIEL Campbell

## 2018-03-06 NOTE — PROGRESS NOTE ADULT - ASSESSMENT
67 yr old male s/p  Heart transplant 2/23/18 for HF   H; GI bleed, LVAD implant, afib, anemia AICD  Intra op acute rejection of graft and Intraop IABP placed  and removed POD 1 and received plasmaphoresis x 2  2/26 UE +RT IJ inominate SC,cephalic DVT and +lt SC DVT  3/1  cardiac bx low grade rejection  3/2 bronchoscopy   neg   3/4  trf too floor,  Imipenum for low grade fevers  3/5 Sputum + PSA, ID to see pt  Vac changed to RLQ  3/6 Pt on cefepime  Remains on lovenox, planning on biopsy thursday, will need to hold lovenox 67 yr old male s/p  Heart transplant 2/23/18 for HF   H; GI bleed, LVAD implant, afib, anemia AICD  Intra op acute rejection of graft and Intraop IABP placed  and removed POD 1 and received plasmaphoresis x 2  2/26 UE +RT IJ inominate SC,cephalic DVT and +lt SC DVT  3/1  cardiac bx low grade rejection  3/2 bronchoscopy   neg   3/4  trf too floor,  Imipenum for low grade fevers  3/5 Sputum + PSA, ID to see pt  Vac changed to RLQ  3/6 Pt on cefepime  Remains on lovenox, planning on biopsy thursday, will need to hold

## 2018-03-06 NOTE — PROGRESS NOTE ADULT - SUBJECTIVE AND OBJECTIVE BOX
INFECTIOUS DISEASES FOLLOW UP-- Sujey Lujan  [phone]    This is a follow up note for this  67yMale with s/p heart transplant on 2/23. Feeling well Mental status intact      ROS:  CONSTITUTIONAL:  No fever, good appetite  CARDIOVASCULAR:  No chest pain or palpitations  RESPIRATORY:  No dyspnea  GASTROINTESTINAL:  No nausea, vomiting, diarrhea, or abdominal pain  GENITOURINARY:  No dysuria  NEUROLOGIC:  No headache,     Allergies    No Known Allergies    Intolerances        ANTIBIOTICS/RELEVANT:  antimicrobials  cefepime  IVPB      cefepime  IVPB 2000 milliGRAM(s) IV Intermittent every 8 hours  nystatin    Suspension 366605 Unit(s) Oral every 6 hours  trimethoprim   80 mG/sulfamethoxazole 400 mG 1 Tablet(s) Oral daily  valGANciclovir 450 milliGRAM(s) Oral every 12 hours    immunologic:  mycophenolate mofetil 1000 milliGRAM(s) Oral <User Schedule>  tacrolimus 7 milliGRAM(s) Oral <User Schedule>    OTHER:  acetaminophen   Tablet. 650 milliGRAM(s) Oral every 6 hours PRN  ALBUTerol/ipratropium for Nebulization 3 milliLiter(s) Nebulizer every 6 hours  docusate sodium 100 milliGRAM(s) Oral three times a day  enoxaparin Injectable 80 milliGRAM(s) SubCutaneous every 12 hours  insulin lispro (HumaLOG) corrective regimen sliding scale   SubCutaneous three times a day before meals  insulin lispro (HumaLOG) corrective regimen sliding scale   SubCutaneous at bedtime  pantoprazole  Injectable 40 milliGRAM(s) IV Push two times a day  predniSONE   Tablet 15 milliGRAM(s) Oral <User Schedule>      Objective:  Vital Signs Last 24 Hrs  T(C): 36.4 (06 Mar 2018 15:00), Max: 36.8 (05 Mar 2018 19:00)  T(F): 97.6 (06 Mar 2018 15:00), Max: 98.2 (05 Mar 2018 19:00)  HR: 103 (06 Mar 2018 15:00) (98 - 112)  BP: 155/65 (06 Mar 2018 15:00) (120/80 - 159/77)  BP(mean): 94 (06 Mar 2018 15:00) (94 - 96)  RR: 18 (06 Mar 2018 15:00) (18 - 18)  SpO2: 97% (06 Mar 2018 15:00) (95% - 97%)    PHYSICAL EXAM:  Constitutional:no acute distress  Eyes:WALT, EOMI  Ear/Nose/Throat: no oral lesions, 	  Respiratory: clear BL anteriorly but decreased at right base  prior driveline exit site dressing intact  Cardiovascular: S1S2  Gastrointestinal:soft, (+) BS, no tenderness  Extremities:no e/e/c  No Lymphadenopathy  IV sites not inflammed.    LABS:                        9.2    24.0  )-----------( 481      ( 06 Mar 2018 07:37 )             28.9     03-06    140  |  106  |  26<H>  ----------------------------<  100<H>  5.2   |  23  |  1.11    Ca    8.4      06 Mar 2018 07:37            MICROBIOLOGY:            RECENT CULTURES:  03-05 @ 17:59  Skin LVAD driveline site  --  --  --    No growth to date.  --  03-02 @ 16:32  .Sputum Sputum  Pseudomonas aeruginosa (Carbapenem Resistant)  Pseudomonas aeruginosa (Carbapenem Resistant)  KAMILA    Few Pseudomonas aeruginosa (Carbapenem Resistant)  Normal Respiratory [place] present  --  03-02 @ 06:41  .Blood Blood-Peripheral  --  --  --    No growth to date.  --  03-01 @ 19:48  .Blood Blood  --  --  --    No growth to date.  --      RADIOLOGY & ADDITIONAL STUDIES:    < from: Xray Chest 1 View- PORTABLE-Routine (03.05.18 @ 06:07) >  Comparison: 3/4/2018.     Themediastinal cardiac silhouette is unremarkable. Sternotomy.    Elevated right hemidiaphragm. Mild right basilar atelectasis. The lungs   are otherwise clear. No change from prior    No acute osseous finding.     < end of copied text >

## 2018-03-06 NOTE — PROGRESS NOTE ADULT - ASSESSMENT
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     Upper extremity DVT  - CHERIE negative  - Continue with Lovenox 1mg/kg BID  - elevate arms above heart level - given several pillows to place under Right arm  - hold lovenox 12 hours prior to myocardial biopsy  - appreciate Vasc sx followup for R 3rd digit ischemia which is stable.     Will follow with you    Thanks    Saurabh Tavarez  [id number]

## 2018-03-06 NOTE — PROGRESS NOTE ADULT - ASSESSMENT
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 Patient on broad federica-operative prophylaxis to Vancomycin/Meropenem/Fluconazole based upon his prior LVAD placement and prolonged hospitalization pre-transplant. Intermediate risk for CMV (seropositive); intermediate risk for toxoplasmosis (seropositive R). Improved aeration today at right lung base. He had Significant amount of sero-sanguinous drainage from old LVAD exit site but cultures are NGTD    - Plan:  Continue OI prophylaxis with Valganciclovir, Bactrim, Nystatin  Receiving Cefepime 2gm q8hr for RLL atelectasis/?pneumonia with CR pseudomonas in sputum  Hep C viral load in the HCV donor is pending- the donor genotype is 1a  HCV viral load 3k - patient will need therapy for HCV as an out patient and weekly HDV viral loads    Gary Lujan MD  [phone]  After 5pm/weekends [phone]

## 2018-03-06 NOTE — PROGRESS NOTE ADULT - ASSESSMENT
Mr. Baez is a 67 year old man with ACC/AHA stage D chronic systolic heart failure due to NICM (LVEF 20%) s/p HM2 LVAD 6/17 c/b pump thrombus now s/p OHT 2/23 (CMV D-/R+ and Toxo D-/R+), with post-op course c/b primary graft dysfunction with biventricular dysfunction, initially thought to be immune-mediated due to positive B-cell flow (negative CDC cross match) and received plasmapharesis/IVIg with improvement in LV function since, now off inotropes. 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 Underwent biopsy which showed CMR 1R, AMR 1 (no cellular injury). Repeat DSA negative.     Immunosuppression prophylaxis:  Tacrolimus - 6 mg q12; please check levels at 7:30 am (stat)  Continue CellCept 1000 mg PO BID  Steroids - continue taper; on prednisone 15 mg PO q12h;   	  Antimicrobial prophylaxis:  Intermediate risk CMV - on oral valganciclovir 450 mg q12  Intermediate risk Toxo - on Bactrim PPx (daily dose)  PCP - on Bactrim 1 SS tab daily     Graft function:  LV function improved; persistent mild RV dysfunction however excellent HD off inotropes  underwent biospy 3/1; positive for grade 1R/AMR 1; DSA negative    Hypoxia:  likely d/t atelectasis; improving; s/p bronch with no mucous plug    Other prophylaxis:  Protonix 40 mg PO daily for GI prophylaxis while on steroids.    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  - f/u cultures  - procalcitonin elevated

## 2018-03-06 NOTE — PROGRESS NOTE ADULT - SUBJECTIVE AND OBJECTIVE BOX
VITAL SIGNS    Telemetry:      Vital Signs Last 24 Hrs  T(C): 36.7 (18 @ 03:10), Max: 36.8 (18 @ 19:00)  T(F): 98.1 (18 @ 03:10), Max: 98.2 (18 @ 19:00)  HR: 100 (18 @ 11:19) (98 - 112)  BP: 143/83 (18 @ 11:19) (120/80 - 159/77)  RR: 18 (18 @ 11:19) (18 - 18)  SpO2: 96% (18 @ 11:19) (94% - 96%)                    @ 07:01  -   @ 07:00  --------------------------------------------------------  IN: 1350 mL / OUT: 1495 mL / NET: -145 mL     @ 07:01  -   @ 13:55  --------------------------------------------------------  IN: 720 mL / OUT: 200 mL / NET: 520 mL          Daily     Daily Weight in k.7 (06 Mar 2018 07:55)        CAPILLARY BLOOD GLUCOSE      POCT Blood Glucose.: 116 mg/dL (06 Mar 2018 11:47)  POCT Blood Glucose.: 111 mg/dL (06 Mar 2018 07:39)  POCT Blood Glucose.: 99 mg/dL (05 Mar 2018 22:19)  POCT Blood Glucose.: 86 mg/dL (05 Mar 2018 19:29)                  Coumadin    [ ] YES          [  ]      NO         Reason:                               PHYSICAL EXAM        Neurology: alert and oriented x 3, nonfocal, no gross deficits  CV :  Sternal Wound :  CDI , Stable  Pacing Wires        [  ]   Settings:                                  Isolated  [  ]  Lungs:  Drains:     MS         [  ] Drainage:                 L Pleural  [  ]  Drainage:                R Pleural  [  ]  Drainage:  Abdomen: soft, nontender, nondistended, positive bowel sounds, last bowel movement   :             Extremities:               acetaminophen   Tablet. 650 milliGRAM(s) Oral every 6 hours PRN  ALBUTerol/ipratropium for Nebulization 3 milliLiter(s) Nebulizer every 6 hours  cefepime  IVPB      cefepime  IVPB 2000 milliGRAM(s) IV Intermittent every 8 hours  docusate sodium 100 milliGRAM(s) Oral three times a day  enoxaparin Injectable 80 milliGRAM(s) SubCutaneous every 12 hours  insulin lispro (HumaLOG) corrective regimen sliding scale   SubCutaneous three times a day before meals  insulin lispro (HumaLOG) corrective regimen sliding scale   SubCutaneous at bedtime  mycophenolate mofetil 1000 milliGRAM(s) Oral <User Schedule>  nystatin    Suspension 899711 Unit(s) Oral every 6 hours  pantoprazole  Injectable 40 milliGRAM(s) IV Push two times a day  predniSONE   Tablet 15 milliGRAM(s) Oral <User Schedule>  tacrolimus 6 milliGRAM(s) Oral <User Schedule>  trimethoprim   80 mG/sulfamethoxazole 400 mG 1 Tablet(s) Oral daily  valGANciclovir 450 milliGRAM(s) Oral every 12 hours                    Physical Therapy Rec:   Home  [  ]   Home w/ PT  [  ]  Rehab  [  ]  Discussed with Cardiothoracic Team at AM rounds. im good    VITAL SIGNS    Telemetry:  nsr 90    Vital Signs Last 24 Hrs  T(C): 36.7 (18 @ 03:10), Max: 36.8 (18 @ 19:00)  T(F): 98.1 (18 @ 03:10), Max: 98.2 (18 @ 19:00)  HR: 100 (18 @ 11:19) (98 - 112)  BP: 143/83 (18 @ 11:19) (120/80 - 159/77)  RR: 18 (18 @ 11:19) (18 - 18)  SpO2: 96% (18 @ 11:19) (94% - 96%)                    @ 07:01  -   @ 07:00  --------------------------------------------------------  IN: 1350 mL / OUT: 1495 mL / NET: -145 mL     @ 07:01  -   @ 13:55  --------------------------------------------------------  IN: 720 mL / OUT: 200 mL / NET: 520 mL          Daily     Daily Weight in k.7 (06 Mar 2018 07:55)        CAPILLARY BLOOD GLUCOSE      POCT Blood Glucose.: 116 mg/dL (06 Mar 2018 11:47)  POCT Blood Glucose.: 111 mg/dL (06 Mar 2018 07:39)  POCT Blood Glucose.: 99 mg/dL (05 Mar 2018 22:19)  POCT Blood Glucose.: 86 mg/dL (05 Mar 2018 19:29)                  Coumadin    [ ] YES          [ x ]      NO                                     PHYSICAL EXAM        Neurology: alert and oriented x 3, nonfocal, no gross deficits  CV : S1 S2 , RRR   Sternal Wound :  CDI , Stable  Lungs: cta  Abdomen: soft, nontender, nondistended, positive bowel sounds, last bowel movement +  :          voiding   Extremities:     Trace edema - calve tenderness          acetaminophen   Tablet. 650 milliGRAM(s) Oral every 6 hours PRN  ALBUTerol/ipratropium for Nebulization 3 milliLiter(s) Nebulizer every 6 hours  cefepime  IVPB      cefepime  IVPB 2000 milliGRAM(s) IV Intermittent every 8 hours  docusate sodium 100 milliGRAM(s) Oral three times a day  enoxaparin Injectable 80 milliGRAM(s) SubCutaneous every 12 hours  insulin lispro (HumaLOG) corrective regimen sliding scale   SubCutaneous three times a day before meals  insulin lispro (HumaLOG) corrective regimen sliding scale   SubCutaneous at bedtime  mycophenolate mofetil 1000 milliGRAM(s) Oral <User Schedule>  nystatin    Suspension 983895 Unit(s) Oral every 6 hours  pantoprazole  Injectable 40 milliGRAM(s) IV Push two times a day  predniSONE   Tablet 15 milliGRAM(s) Oral <User Schedule>  tacrolimus 6 milliGRAM(s) Oral <User Schedule>  trimethoprim   80 mG/sulfamethoxazole 400 mG 1 Tablet(s) Oral daily  valGANciclovir 450 milliGRAM(s) Oral every 12 hours                    Physical Therapy Rec:   Home  [  ]   Home w/ PT  [  ]  Rehab  [  ]  Discussed with Cardiothoracic Team at AM rounds.

## 2018-03-06 NOTE — PROGRESS NOTE ADULT - SUBJECTIVE AND OBJECTIVE BOX
Interval History:  No acute events overnight noted.     Medications:  acetaminophen   Tablet. 650 milliGRAM(s) Oral every 6 hours PRN  ALBUTerol/ipratropium for Nebulization 3 milliLiter(s) Nebulizer every 6 hours  cefepime  IVPB      cefepime  IVPB 2000 milliGRAM(s) IV Intermittent every 8 hours  docusate sodium 100 milliGRAM(s) Oral three times a day  enoxaparin Injectable 80 milliGRAM(s) SubCutaneous every 12 hours  insulin lispro (HumaLOG) corrective regimen sliding scale   SubCutaneous three times a day before meals  insulin lispro (HumaLOG) corrective regimen sliding scale   SubCutaneous at bedtime  mycophenolate mofetil 1000 milliGRAM(s) Oral <User Schedule>  nystatin    Suspension 250918 Unit(s) Oral every 6 hours  pantoprazole  Injectable 40 milliGRAM(s) IV Push two times a day  predniSONE   Tablet 15 milliGRAM(s) Oral <User Schedule>  tacrolimus 6 milliGRAM(s) Oral <User Schedule>  trimethoprim   80 mG/sulfamethoxazole 400 mG 1 Tablet(s) Oral daily  valGANciclovir 450 milliGRAM(s) Oral every 12 hours    Vitals:  T(C): 36.7 (18 @ 03:10), Max: 36.8 (18 @ 19:00)  HR: 98 (18 @ 03:10) (98 - 112)  BP: 120/80 (18 @ 03:10) (120/80 - 159/77)  BP(mean): 96 (18 @ 03:10) (94 - 96)  RR: 18 (18 @ 03:10) (18 - 18)  SpO2: 96% (18 @ 03:10) (94% - 96%)    Daily     Daily Weight in k.7 (06 Mar 2018 07:55)        I&O's Summary    05 Mar 2018 07:  -  06 Mar 2018 07:00  --------------------------------------------------------  IN: 1350 mL / OUT: 1495 mL / NET: -145 mL    06 Mar 2018 07:01  -  06 Mar 2018 09:09  --------------------------------------------------------  IN: 360 mL / OUT: 0 mL / NET: 360 mL        Physical Exam:  Appearance: No Acute Distress  HEENT: No JVD  Cardiovascular: Normal S1 S2, No murmurs/rubs/gallops  Respiratory: Clear to auscultation bilaterally  Gastrointestinal: Soft, Non-tender	  Skin: No cyanosis	  Neurologic: Non-focal  Extremities: No LE edema  Psychiatry: A & O x 3, Mood & affect appropriate      Labs:                        9.2    24.0  )-----------( 481      ( 06 Mar 2018 07:37 )             28.9     03-06    140  |  106  |  26<H>  ----------------------------<  100<H>  5.2   |  23  |  1.11    Ca    8.4      06 Mar 2018 07:37            Serum Pro-Brain Natriuretic Peptide: 8160 pg/mL ( @ 03:34)              TELEMETRY:  Sinus, Sinus Tachy 90s  - 110s. No more WCT    [ ] Echocardiogram: Interval History:  No acute events overnight noted. Pt feels "great". States walked to the bathroom with no issues. Denies CP, SOB, N/V/D, fevers or chills. He reports that his RUE swelling has improved.     Medications:  acetaminophen   Tablet. 650 milliGRAM(s) Oral every 6 hours PRN  ALBUTerol/ipratropium for Nebulization 3 milliLiter(s) Nebulizer every 6 hours  cefepime  IVPB      cefepime  IVPB 2000 milliGRAM(s) IV Intermittent every 8 hours  docusate sodium 100 milliGRAM(s) Oral three times a day  enoxaparin Injectable 80 milliGRAM(s) SubCutaneous every 12 hours  insulin lispro (HumaLOG) corrective regimen sliding scale   SubCutaneous three times a day before meals  insulin lispro (HumaLOG) corrective regimen sliding scale   SubCutaneous at bedtime  mycophenolate mofetil 1000 milliGRAM(s) Oral <User Schedule>  nystatin    Suspension 198924 Unit(s) Oral every 6 hours  pantoprazole  Injectable 40 milliGRAM(s) IV Push two times a day  predniSONE   Tablet 15 milliGRAM(s) Oral <User Schedule>  tacrolimus 6 milliGRAM(s) Oral <User Schedule>  trimethoprim   80 mG/sulfamethoxazole 400 mG 1 Tablet(s) Oral daily  valGANciclovir 450 milliGRAM(s) Oral every 12 hours    Vitals:  T(C): 36.7 (18 @ 03:10), Max: 36.8 (18 @ 19:00)  HR: 98 (18 @ 03:10) (98 - 112)  BP: 120/80 (18 @ 03:10) (120/80 - 159/77)  BP(mean): 96 (18 @ 03:10) (94 - 96)  RR: 18 (18 @ 03:10) (18 - 18)  SpO2: 96% (18 @ 03:10) (94% - 96%)    Daily     Daily Weight in k.7 (06 Mar 2018 07:55)        I&O's Summary    05 Mar 2018 07:01  -  06 Mar 2018 07:00  --------------------------------------------------------  IN: 1350 mL / OUT: 1495 mL / NET: -145 mL    06 Mar 2018 07:01  -  06 Mar 2018 09:09  --------------------------------------------------------  IN: 360 mL / OUT: 0 mL / NET: 360 mL        Physical Exam:  Appearance: No Acute Distress  Cardiovascular: Normal S1 S2, tachy,   Respiratory: Clear to auscultation bilaterally  Gastrointestinal: Soft, Non-tender	  Skin: sternotomy site intact  Neurologic: Non-focal  Extremities: No LE edema  Psychiatry: A & O x 3, Mood & affect appropriate      Labs:                        9.2    24.0  )-----------( 481      ( 06 Mar 2018 07:37 )             28.9     -    140  |  106  |  26<H>  ----------------------------<  100<H>  5.2   |  23  |  1.11    Ca    8.4      06 Mar 2018 07:37            Serum Pro-Brain Natriuretic Peptide: 8160 pg/mL ( @ 03:34)              TELEMETRY:  Sinus, Sinus Tachy 90s  - 110s. No more WCT    [ ] Echocardiogram:

## 2018-03-07 LAB
ALBUMIN SERPL ELPH-MCNC: 2.8 G/DL — LOW (ref 3.3–5)
ALP SERPL-CCNC: 71 U/L — SIGNIFICANT CHANGE UP (ref 40–120)
ALT FLD-CCNC: 29 U/L RC — SIGNIFICANT CHANGE UP (ref 10–45)
ANION GAP SERPL CALC-SCNC: 6 MMOL/L — SIGNIFICANT CHANGE UP (ref 5–17)
AST SERPL-CCNC: 43 U/L — HIGH (ref 10–40)
BILIRUB DIRECT SERPL-MCNC: 0.1 MG/DL — SIGNIFICANT CHANGE UP (ref 0–0.2)
BILIRUB INDIRECT FLD-MCNC: 0.4 MG/DL — SIGNIFICANT CHANGE UP (ref 0.2–1)
BILIRUB SERPL-MCNC: 0.5 MG/DL — SIGNIFICANT CHANGE UP (ref 0.2–1.2)
BUN SERPL-MCNC: 26 MG/DL — HIGH (ref 7–23)
CALCIUM SERPL-MCNC: 8.5 MG/DL — SIGNIFICANT CHANGE UP (ref 8.4–10.5)
CHLORIDE SERPL-SCNC: 107 MMOL/L — SIGNIFICANT CHANGE UP (ref 96–108)
CO2 SERPL-SCNC: 25 MMOL/L — SIGNIFICANT CHANGE UP (ref 22–31)
CREAT SERPL-MCNC: 1.02 MG/DL — SIGNIFICANT CHANGE UP (ref 0.5–1.3)
CULTURE RESULTS: SIGNIFICANT CHANGE UP
CULTURE RESULTS: SIGNIFICANT CHANGE UP
GLUCOSE BLDC GLUCOMTR-MCNC: 102 MG/DL — HIGH (ref 70–99)
GLUCOSE BLDC GLUCOMTR-MCNC: 103 MG/DL — HIGH (ref 70–99)
GLUCOSE SERPL-MCNC: 106 MG/DL — HIGH (ref 70–99)
HCT VFR BLD CALC: 29.6 % — LOW (ref 39–50)
HGB BLD-MCNC: 9.5 G/DL — LOW (ref 13–17)
MCHC RBC-ENTMCNC: 30.7 PG — SIGNIFICANT CHANGE UP (ref 27–34)
MCHC RBC-ENTMCNC: 32 GM/DL — SIGNIFICANT CHANGE UP (ref 32–36)
MCV RBC AUTO: 96 FL — SIGNIFICANT CHANGE UP (ref 80–100)
PLATELET # BLD AUTO: 501 K/UL — HIGH (ref 150–400)
POTASSIUM SERPL-MCNC: 5.3 MMOL/L — SIGNIFICANT CHANGE UP (ref 3.5–5.3)
POTASSIUM SERPL-SCNC: 5.3 MMOL/L — SIGNIFICANT CHANGE UP (ref 3.5–5.3)
PROT SERPL-MCNC: 5.9 G/DL — LOW (ref 6–8.3)
RBC # BLD: 3.09 M/UL — LOW (ref 4.2–5.8)
RBC # FLD: 15.9 % — HIGH (ref 10.3–14.5)
SODIUM SERPL-SCNC: 138 MMOL/L — SIGNIFICANT CHANGE UP (ref 135–145)
SPECIMEN SOURCE: SIGNIFICANT CHANGE UP
SPECIMEN SOURCE: SIGNIFICANT CHANGE UP
TACROLIMUS SERPL-MCNC: 8.3 NG/ML — SIGNIFICANT CHANGE UP
WBC # BLD: 25.9 K/UL — HIGH (ref 3.8–10.5)
WBC # FLD AUTO: 25.9 K/UL — HIGH (ref 3.8–10.5)

## 2018-03-07 PROCEDURE — 99232 SBSQ HOSP IP/OBS MODERATE 35: CPT

## 2018-03-07 PROCEDURE — 73120 X-RAY EXAM OF HAND: CPT | Mod: 26,RT

## 2018-03-07 PROCEDURE — 71045 X-RAY EXAM CHEST 1 VIEW: CPT | Mod: 26

## 2018-03-07 PROCEDURE — 99233 SBSQ HOSP IP/OBS HIGH 50: CPT

## 2018-03-07 RX ORDER — METOPROLOL TARTRATE 50 MG
50 TABLET ORAL DAILY
Qty: 0 | Refills: 0 | Status: DISCONTINUED | OUTPATIENT
Start: 2018-03-07 | End: 2018-03-08

## 2018-03-07 RX ORDER — VANCOMYCIN HCL 1 G
750 VIAL (EA) INTRAVENOUS EVERY 12 HOURS
Qty: 0 | Refills: 0 | Status: DISCONTINUED | OUTPATIENT
Start: 2018-03-07 | End: 2018-03-10

## 2018-03-07 RX ADMIN — Medication 150 MILLIGRAM(S): at 17:37

## 2018-03-07 RX ADMIN — TACROLIMUS 7 MILLIGRAM(S): 5 CAPSULE ORAL at 20:04

## 2018-03-07 RX ADMIN — Medication 3 MILLILITER(S): at 11:57

## 2018-03-07 RX ADMIN — ENOXAPARIN SODIUM 80 MILLIGRAM(S): 100 INJECTION SUBCUTANEOUS at 05:46

## 2018-03-07 RX ADMIN — PANTOPRAZOLE SODIUM 40 MILLIGRAM(S): 20 TABLET, DELAYED RELEASE ORAL at 05:47

## 2018-03-07 RX ADMIN — TACROLIMUS 7 MILLIGRAM(S): 5 CAPSULE ORAL at 08:00

## 2018-03-07 RX ADMIN — CEFEPIME 100 MILLIGRAM(S): 1 INJECTION, POWDER, FOR SOLUTION INTRAMUSCULAR; INTRAVENOUS at 21:24

## 2018-03-07 RX ADMIN — Medication 3 MILLILITER(S): at 05:46

## 2018-03-07 RX ADMIN — VALGANCICLOVIR 450 MILLIGRAM(S): 450 TABLET, FILM COATED ORAL at 20:03

## 2018-03-07 RX ADMIN — VALGANCICLOVIR 450 MILLIGRAM(S): 450 TABLET, FILM COATED ORAL at 08:00

## 2018-03-07 RX ADMIN — MYCOPHENOLATE MOFETIL 1000 MILLIGRAM(S): 250 CAPSULE ORAL at 20:03

## 2018-03-07 RX ADMIN — Medication 650 MILLIGRAM(S): at 20:09

## 2018-03-07 RX ADMIN — Medication 50 MILLIGRAM(S): at 17:42

## 2018-03-07 RX ADMIN — Medication 15 MILLIGRAM(S): at 20:03

## 2018-03-07 RX ADMIN — MYCOPHENOLATE MOFETIL 1000 MILLIGRAM(S): 250 CAPSULE ORAL at 07:59

## 2018-03-07 RX ADMIN — Medication 3 MILLILITER(S): at 23:47

## 2018-03-07 RX ADMIN — Medication 100 MILLIGRAM(S): at 05:46

## 2018-03-07 RX ADMIN — Medication 650 MILLIGRAM(S): at 20:49

## 2018-03-07 RX ADMIN — Medication 500000 UNIT(S): at 17:39

## 2018-03-07 RX ADMIN — CEFEPIME 100 MILLIGRAM(S): 1 INJECTION, POWDER, FOR SOLUTION INTRAMUSCULAR; INTRAVENOUS at 13:33

## 2018-03-07 RX ADMIN — Medication 500000 UNIT(S): at 23:47

## 2018-03-07 RX ADMIN — Medication 500000 UNIT(S): at 05:46

## 2018-03-07 RX ADMIN — CEFEPIME 100 MILLIGRAM(S): 1 INJECTION, POWDER, FOR SOLUTION INTRAMUSCULAR; INTRAVENOUS at 05:46

## 2018-03-07 RX ADMIN — PANTOPRAZOLE SODIUM 40 MILLIGRAM(S): 20 TABLET, DELAYED RELEASE ORAL at 17:39

## 2018-03-07 RX ADMIN — Medication 15 MILLIGRAM(S): at 07:59

## 2018-03-07 RX ADMIN — Medication 3 MILLILITER(S): at 17:38

## 2018-03-07 RX ADMIN — Medication 500000 UNIT(S): at 11:58

## 2018-03-07 RX ADMIN — Medication 1 TABLET(S): at 11:58

## 2018-03-07 NOTE — PROGRESS NOTE ADULT - SUBJECTIVE AND OBJECTIVE BOX
PAGER:  [phone]               [facility] [id number]              EMAIL [email or web address]   OFFICE [phone]                              ********VASCULAR MEDICINE PROGRESS NOTE********                        CC:  UE DVT    INTERVAL HISTORY:    No issues.  Arms feel fine.  R upper extremity still with some mild swelling.  R 3rd digit stable.      HISTORY OF PRESENT ILLNESS:  HPI:  67M PMH Chronic Systolic Heart Failure (ACC/AHA Stage D) due to NICMP s/p LVAD 6/12/17, GIB s/p Cautery (7/25/2017), known AV Malformations, Chronic Anemia due to numerous GIBs (off AC), A-Fib, VT s/p AICD. Pt admitted due to elevated LDH level of 646 for further evaluation and observation.  . Pt denies any chest pain, SOB, palpitations, N/D/V, abdominal pain, headaches, changes in vision, dizziness, pain or burning while urinating, swelling, numbness/tingling anywhere, rashes, fever, or recent travel. (01 Feb 2018 19:36)          Allergies    No Known Allergies    MEDICATIONS  (STANDING):  ALBUTerol/ipratropium for Nebulization 3 milliLiter(s) Nebulizer every 6 hours  cefepime  IVPB      cefepime  IVPB 2000 milliGRAM(s) IV Intermittent every 8 hours  docusate sodium 100 milliGRAM(s) Oral three times a day  insulin lispro (HumaLOG) corrective regimen sliding scale   SubCutaneous three times a day before meals  insulin lispro (HumaLOG) corrective regimen sliding scale   SubCutaneous at bedtime  mycophenolate mofetil 1000 milliGRAM(s) Oral <User Schedule>  nystatin    Suspension 055350 Unit(s) Oral every 6 hours  pantoprazole  Injectable 40 milliGRAM(s) IV Push two times a day  predniSONE   Tablet 15 milliGRAM(s) Oral <User Schedule>  tacrolimus 7 milliGRAM(s) Oral <User Schedule>  trimethoprim   80 mG/sulfamethoxazole 400 mG 1 Tablet(s) Oral daily  valGANciclovir 450 milliGRAM(s) Oral every 12 hours    MEDICATIONS  (PRN):  acetaminophen   Tablet. 650 milliGRAM(s) Oral every 6 hours PRN Mild Pain (1 - 3)    PAST MEDICAL & SURGICAL HISTORY:  GIB (gastrointestinal bleeding)  Ventricular fibrillation: s/p AICD  PAF (paroxysmal atrial fibrillation): on xarelto  Non-Ischemic Cardiomyopathy  SVT (Supraventricular Tachycardia)  HTN  CHF (Congestive Heart Failure)  LVAD (left ventricular assist device) present  Status post left hip replacement  History of Prior Ablation Treatment: for afib  AICD (Automatic Cardioverter/Defibrillator) Present: [employer] with 1 [employer] lead4/1/09- explanted and replaced with [employer] 2 leads on 9/2/09      FAMILY HISTORY:  No pertinent family history in first degree relatives      SOCIAL HISTORY:  unchanged    REVIEW OF SYSTEMS:  CONSTITUTIONAL: No fever, weight loss, or fatigue  EYES: No eye pain, visual disturbances, or discharge  ENMT:  No difficulty hearing, tinnitus, vertigo; No sinus or throat pain  NECK: No pain or stiffness  RESPIRATORY: No cough, wheezing, chills or hemoptysis; No Shortness of Breath  CARDIOVASCULAR: No chest pain, palpitations, passing out, dizziness, or leg swelling  GASTROINTESTINAL: No abdominal or epigastric pain. No nausea, vomiting, or hematemesis; No diarrhea or constipation. No melena or hematochezia.  GENITOURINARY: No dysuria, frequency, hematuria, or incontinence  NEUROLOGICAL: No headaches, memory loss, loss of strength, numbness, or tremors  SKIN: No itching, burning, rashes, or lesions   LYMPH Nodes: No enlarged glands  ENDOCRINE: No heat or cold intolerance; No hair loss  MUSCULOSKELETAL: No joint pain or swelling; No muscle, back, or extremity pain  PSYCHIATRIC: No depression, anxiety, mood swings, or difficulty sleeping  HEME/LYMPH: No easy bruising, or bleeding gums  ALLERY AND IMMUNOLOGIC: No hives or eczema	    [x ] All others negative	  [ ] Unable to obtain               ICU Vital Signs Last 24 Hrs  T(C): 36.7 (07 Mar 2018 07:48), Max: 37.1 (06 Mar 2018 19:46)  T(F): 98 (07 Mar 2018 07:48), Max: 98.7 (06 Mar 2018 19:46)  HR: 103 (07 Mar 2018 07:48) (94 - 107)  BP: 138/75 (07 Mar 2018 07:48) (124/80 - 155/65)  BP(mean): 105 (06 Mar 2018 19:46) (94 - 105)  ABP: --  ABP(mean): --  RR: 18 (07 Mar 2018 07:48) (18 - 19)  SpO2: 93% (07 Mar 2018 07:48) (93% - 98%)      Appearance: Normal, on high flow oxgyen  HEENT:   Normal oral mucosa, PERRL, EOMI	  Lymphatic: No lymphadenopathy  Cardiovascular: Normal S1 S2 tachycardia.   Respiratory: clear	  Psychiatry: A & O x 3, Mood & affect appropriate  Gastrointestinal:  Soft, Non-tender, + BS	  Skin: No rashes, No ecchymoses, No cyanosis	  Neurologic: Non-focal  Extremities: RUE edema.  3rd digit R hand ischemia, motion and sensation intact.                         9.5    25.9  )-----------( 501      ( 07 Mar 2018 07:15 )             29.6   03-07    138  |  107  |  26<H>  ----------------------------<  106<H>  5.3   |  25  |  1.02    Ca    8.5      07 Mar 2018 07:15    TPro  5.9<L>  /  Alb  2.8<L>  /  TBili  0.5  /  DBili  0.1  /  AST  43<H>  /  ALT  29  /  AlkPhos  71  03-07

## 2018-03-07 NOTE — PROGRESS NOTE ADULT - SUBJECTIVE AND OBJECTIVE BOX
INFECTIOUS DISEASES FOLLOW UP-- Sujey Lujan  [phone]    This is a follow up note for this  67yMale with s/p heart transplant on 2/23, no interval events overnight. Has a leukocytosis 26k range      ROS:  CONSTITUTIONAL:  No fever, good appetite  CARDIOVASCULAR:  No chest pain or palpitations  RESPIRATORY:  No dyspnea  GASTROINTESTINAL:  No nausea, vomiting, diarrhea, or abdominal pain  GENITOURINARY:  No dysuria  NEUROLOGIC:  No headache,     Allergies    No Known Allergies    Intolerances        ANTIBIOTICS/RELEVANT:  antimicrobials  cefepime  IVPB      cefepime  IVPB 2000 milliGRAM(s) IV Intermittent every 8 hours  nystatin    Suspension 724940 Unit(s) Oral every 6 hours  trimethoprim   80 mG/sulfamethoxazole 400 mG 1 Tablet(s) Oral daily  valGANciclovir 450 milliGRAM(s) Oral every 12 hours  vancomycin  IVPB 750 milliGRAM(s) IV Intermittent every 12 hours    immunologic:  mycophenolate mofetil 1000 milliGRAM(s) Oral <User Schedule>  tacrolimus 7 milliGRAM(s) Oral <User Schedule>    OTHER:  acetaminophen   Tablet. 650 milliGRAM(s) Oral every 6 hours PRN  ALBUTerol/ipratropium for Nebulization 3 milliLiter(s) Nebulizer every 6 hours  docusate sodium 100 milliGRAM(s) Oral three times a day  insulin lispro (HumaLOG) corrective regimen sliding scale   SubCutaneous three times a day before meals  insulin lispro (HumaLOG) corrective regimen sliding scale   SubCutaneous at bedtime  pantoprazole  Injectable 40 milliGRAM(s) IV Push two times a day  predniSONE   Tablet 15 milliGRAM(s) Oral <User Schedule>      Objective:  Vital Signs Last 24 Hrs  T(C): 36.9 (07 Mar 2018 11:51), Max: 37.1 (06 Mar 2018 19:46)  T(F): 98.4 (07 Mar 2018 11:51), Max: 98.7 (06 Mar 2018 19:46)  HR: 101 (07 Mar 2018 11:51) (94 - 107)  BP: 172/88 (07 Mar 2018 11:51) (124/80 - 172/88)  BP(mean): 105 (06 Mar 2018 19:46) (94 - 105)  RR: 18 (07 Mar 2018 11:51) (18 - 19)  SpO2: 94% (07 Mar 2018 11:51) (93% - 98%)    PHYSICAL EXAM:  Constitutional:no acute distress, awake and alert  Eyes:WALT, EOMI  Ear/Nose/Throat: no oral lesions, 	  Respiratory: clear BL anteriorly but with persistent diminished sounds at the right base  old driveline site is packed  Cardiovascular: S1S2  Gastrointestinal:soft, (+) BS, no tenderness  Extremities:no e/e/c, old arthritis changes in knees, right middle finger with ? gout ? tophi distally, but not very tender  No Lymphadenopathy  IV sites not inflammed.    LABS:                        9.5    25.9  )-----------( 501      ( 07 Mar 2018 07:15 )             29.6     03-07    138  |  107  |  26<H>  ----------------------------<  106<H>  5.3   |  25  |  1.02    Ca    8.5      07 Mar 2018 07:15    TPro  5.9<L>  /  Alb  2.8<L>  /  TBili  0.5  /  DBili  0.1  /  AST  43<H>  /  ALT  29  /  AlkPhos  71  03-07          MICROBIOLOGY:            RECENT CULTURES:  03-05 @ 17:59  Skin LVAD driveline site  --  --  --    No growth to date.  --  03-02 @ 16:32  .Sputum Sputum  Pseudomonas aeruginosa (Carbapenem Resistant)  Pseudomonas aeruginosa (Carbapenem Resistant)  KAMILA    Few Pseudomonas aeruginosa (Carbapenem Resistant)  Normal Respiratory [place] present  --  03-02 @ 06:41  .Blood Blood-Peripheral  --  --  --    No growth at 5 days.  --  03-01 @ 19:48  .Blood Blood  --  --  --    No growth at 5 days.  --      RADIOLOGY & ADDITIONAL STUDIES:    < from: Xray Chest 1 View- PORTABLE-Urgent (03.07.18 @ 11:02) >    IMPRESSION:     Small right pleural effusion with adjacent atelectasis.    < end of copied text >

## 2018-03-07 NOTE — PROGRESS NOTE ADULT - ASSESSMENT
68 yo man s/p prior LVAD placement, no infections related to the LVAD prior to undergoing an orthotopic heart transplant 2/23 from a reportedly high risk donor HCV. Patient on broad federica-operative prophylaxis to Vancomycin/Meropenem/Fluconazole based upon his prior LVAD placement and prolonged hospitalization pre-transplant. Intermediate risk for CMV (seropositive); intermediate risk for toxoplasmosis (seropositive R). Improved aeration today at right lung base. He had Significant amount of sero-sanguinous drainage from old LVAD exit site but cultures are NGTD    - Plan:  Continue OI prophylaxis with Valganciclovir, Bactrim, Nystatin  Receiving Cefepime 2gm q8hr for RLL atelectasis/?pneumonia with CR pseudomonas in sputum  Would place on IV Vancomycin 750mg q 12hr given increasing WBC and drainage from old LVAD site  Would image with x-ray the right middle finger  Hep C viral load in the HCV donor is pending- the donor genotype is 1a  HCV viral load 3k - patient will need therapy for HCV as an out patient and weekly HCV viral loads    Gary Lujan MD  [phone]  After 5pm/weekends [phone]

## 2018-03-07 NOTE — PROGRESS NOTE ADULT - SUBJECTIVE AND OBJECTIVE BOX
Interval History:  No acute events overnight noted. Pt seen and examined in bed. States he feels really well. No complaints. Did endorse cough overnight with small white sputum production. Denies fevers, chillls, sob, cp, n/v/d, abdominal pain. He reports that he feels his RUE is improved but did note a new white spot on his 3rd digit. Pt is ambulating.    Medications:  acetaminophen   Tablet. 650 milliGRAM(s) Oral every 6 hours PRN  ALBUTerol/ipratropium for Nebulization 3 milliLiter(s) Nebulizer every 6 hours  cefepime  IVPB      cefepime  IVPB 2000 milliGRAM(s) IV Intermittent every 8 hours  docusate sodium 100 milliGRAM(s) Oral three times a day  enoxaparin Injectable 80 milliGRAM(s) SubCutaneous every 12 hours  insulin lispro (HumaLOG) corrective regimen sliding scale   SubCutaneous three times a day before meals  insulin lispro (HumaLOG) corrective regimen sliding scale   SubCutaneous at bedtime  mycophenolate mofetil 1000 milliGRAM(s) Oral <User Schedule>  nystatin    Suspension 917532 Unit(s) Oral every 6 hours  pantoprazole  Injectable 40 milliGRAM(s) IV Push two times a day  predniSONE   Tablet 15 milliGRAM(s) Oral <User Schedule>  tacrolimus 7 milliGRAM(s) Oral <User Schedule>  trimethoprim   80 mG/sulfamethoxazole 400 mG 1 Tablet(s) Oral daily  valGANciclovir 450 milliGRAM(s) Oral every 12 hours    Vitals:  T(C): 36.7 (18 @ 07:48), Max: 37.1 (18 @ 19:46)  HR: 103 (18 @ 07:48) (94 - 107)  BP: 138/75 (18 @ 07:48) (124/80 - 155/65)  BP(mean): 105 (18 @ 19:46) (94 - 105)  RR: 18 (18 @ 07:48) (18 - 19)  SpO2: 93% (18 @ 07:48) (93% - 98%)      Daily     Daily Weight in k.5 (07 Mar 2018 04:50)      I&O's Summary    06 Mar 2018 07:01  -  07 Mar 2018 07:00  --------------------------------------------------------  IN: 1390 mL / OUT: 1375 mL / NET: 15 mL    07 Mar 2018 07:01  -  07 Mar 2018 10:51  --------------------------------------------------------  IN: 360 mL / OUT: 0 mL / NET: 360 mL        Physical Exam:  Appearance: No Acute Distress, comfortable in bed  Cardiovascular: Normal S1 S2, tachy  Respiratory: Clear to auscultation bilaterally  Gastrointestinal: Soft, Non-tender	  Skin: 3rd r digit stable, new white spot	  Neurologic: Non-focal  Extremities: No LE edema  Psychiatry: A & O x 3, Mood & affect appropriate    Labs:                        9.5    25.9  )-----------( 501      ( 07 Mar 2018 07:15 )             29.6     03-07    138  |  107  |  26<H>  ----------------------------<  106<H>  5.3   |  25  |  1.02    Ca    8.5      07 Mar 2018 07:15      Serum Pro-Brain Natriuretic Peptide: 8160 pg/mL ( @ 03:34)              TELEMETRY: Sinus, Sinus tach 90-100s    [ ] Echocardiogram:  < from: Transthoracic Echocardiogram (18 @ 20:04) >  Dimensions:    Normal Values:  LA:     3.6    2.0 - 4.0 cm  Ao:     3.9    2.0 - 3.8 cm  SEPTUM: 1.1    0.6 - 1.2 cm  PWT:    1.1    0.6 - 1.1 cm  LVIDd:  3.9    3.0 - 5.6 cm  LVIDs:  2.9    1.8 - 4.0 cm  Derived variables:  LVMI: 73 g/m2  RWT: 0.56  Fractional short: 26 %  EF (Visual Estimate): 55-60 %  EF (Teicholtz): 51 %  Doppler Peak Velocity (m/sec): AoV=1.1  ------------------------------------------------------------------------  Observations:  Mitral Valve: Normal mitral valve. Minimal mitral  regurgitation.  Aortic Valve/Aorta: Normal trileaflet aortic valve. Peak  transaortic valve gradient equals 5 mm Hg. Peak left  ventricular outflow tract gradient equals 3 mm Hg.  Aortic Root: 3.9 cm.  LVOT diameter: 2.1 cm.  Left Atrium: Moderately dilated left atrium, which is  probably normal in the setting of cardiac transplant. LA  volume index = 43 cc/m2.  Left Ventricle: Normal left ventricular systolic function.  No segmental wall motion abnormalities. Normal left  ventricular internal dimensions and wall thicknesses.  Normal diastolic function  Right Heart: Normal right atrium. Right ventricular  enlargement with decreased right ventricular systolic  function. Normal tricuspid valve. Normal pulmonic valve.  Minimal pulmonic regurgitation.  Pericardium/Pleura: No pericardial effusion seen.  Hemodynamic: IVC not well seen (unable to estimate RA  pressure).  Inadequate tricuspid regurgitation Doppler  envelope precludes estimation of RVSP.  ------------------------------------------------------------------------  Conclusions:  1. Moderately dilated left atrium, which is probably normal  in the setting of cardiac transplant. LA volume index = 43  cc/m2.  2. Normal left ventricular internal dimensions and wall  thicknesses.  3. Normal left ventricular systolic function. No segmental  wall motion abnormalities.  4. Right ventricular enlargement with decreased right  ventricular systolic function.  5. Estimated right ventricular systolic pressure equals 22  mm Hg, assuming right atrial pressure equals 8 mm Hg,  consistent with normal pulmonary pressures.  6. No pericardial effusion seen.  *** Compared with echocardiogram of 3/1/2018, right  ventricular systolic function appears slightly better.  Technically difficult study (post-transplant).    < end of copied text >

## 2018-03-07 NOTE — PROGRESS NOTE ADULT - ASSESSMENT
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 Underwent biopsy which showed CMR 1R, AMR 1 (no cellular injury). Repeat DSA negative.     Immunosuppression prophylaxis:  Tacrolimus - started on prograf 2/25; now on 7mg BID please check levels at 7:30 am (stat)  Continue CellCept 1000 mg PO BID  Steroids - continue taper; on prednisone 15 mg PO q12h;   	  Antimicrobial prophylaxis:  Intermediate risk CMV - on oral valganciclovir 450 mg q12  Intermediate risk Toxo - on Bactrim PPx (daily dose)  PCP - on Bactrim 1 SS tab daily     Graft function:  LV function improved; persistent mild RV dysfunction however excellent HD off inotropes  underwent biospy 3/1; positive for grade 1R/AMR 1; DSA negative  next biopsy 3/8    Hypoxia:  saturating above 93% on room air;  c/w incentive spirometer    Other prophylaxis:  Protonix 40 mg PO daily for GI prophylaxis while on steroids.    Bilateral IJ/Subclavian thrombi  - HIT Ab positive; CHERIE negative; c/w lovenox  - appreciate vasc sx/card f/u    CAV PPx  - will start ASA when more stable  - statin prior to discharge    ID - afebrile, rising WBC; serosanguinous drainage  - f/u cultures  - on cefepime for pseudomonas in sputum  - f/u ID recs.

## 2018-03-07 NOTE — PROGRESS NOTE ADULT - ASSESSMENT
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     Upper extremity DVT  - Continue with Lovenox 1mg/kg BID  - elevate arms above heart level - given several pillows to place under Right arm  - hold lovenox 12 hours prior to myocardial biopsy, and resume when bleeding risk acceptable.  - appreciate Vasc sx followup for R 3rd digit ischemia which is stable.     Will follow with you    Thanks    Saurabh Tavarez  [phone]

## 2018-03-07 NOTE — PROGRESS NOTE ADULT - ATTENDING COMMENTS
no events. afebrile. cough productive of white sputum.   cefipime/vanco. pseudomonas in sputum, possible right basal consolidation. drive line d/c. 3rd digit right hand still swollen.  meds: prograf 7 bid (increased yesterday for a level 8.8). prograf 8.3. cellcept 1g bid, pred 15 bid, valcyte 450 bid, bactirm ss, nystatin  afebrile, , 120/-150/  03-07    138  |  107  |  26<H>  ----------------------------<  106<H>  5.3   |  25  |  1.02    Ca    8.5      07 Mar 2018 07:15    TPro  5.9<L>  /  Alb  2.8<L>  /  TBili  0.5  /  DBili  0.1  /  AST  43<H>  /  ALT  29  /  AlkPhos  71  03-07                        9.5    25.9  )-----------( 501      ( 07 Mar 2018 07:15 )             29.6   Rising WBC. Finger is more swollen.   Antibiotics as per Dr Lujan. Image finger.   Rheumatology consult.  Increase prograf 8 bid.   Toprol XL 50 to 100 for BP.   hold lovanox for biopsy tomorrow morning   Marvin Campbell

## 2018-03-08 ENCOUNTER — RESULT REVIEW (OUTPATIENT)
Age: 68
End: 2018-03-08

## 2018-03-08 DIAGNOSIS — L81.9 DISORDER OF PIGMENTATION, UNSPECIFIED: ICD-10-CM

## 2018-03-08 LAB
ALBUMIN SERPL ELPH-MCNC: 2.9 G/DL — LOW (ref 3.3–5)
ALBUMIN SERPL ELPH-MCNC: 3.1 G/DL — LOW (ref 3.3–5)
ALP SERPL-CCNC: 76 U/L — SIGNIFICANT CHANGE UP (ref 40–120)
ALP SERPL-CCNC: 87 U/L — SIGNIFICANT CHANGE UP (ref 40–120)
ALT FLD-CCNC: 29 U/L RC — SIGNIFICANT CHANGE UP (ref 10–45)
ALT FLD-CCNC: 29 U/L RC — SIGNIFICANT CHANGE UP (ref 10–45)
ANION GAP SERPL CALC-SCNC: 10 MMOL/L — SIGNIFICANT CHANGE UP (ref 5–17)
ANION GAP SERPL CALC-SCNC: 8 MMOL/L — SIGNIFICANT CHANGE UP (ref 5–17)
AST SERPL-CCNC: 25 U/L — SIGNIFICANT CHANGE UP (ref 10–40)
AST SERPL-CCNC: 30 U/L — SIGNIFICANT CHANGE UP (ref 10–40)
BILIRUB SERPL-MCNC: 0.4 MG/DL — SIGNIFICANT CHANGE UP (ref 0.2–1.2)
BILIRUB SERPL-MCNC: 0.5 MG/DL — SIGNIFICANT CHANGE UP (ref 0.2–1.2)
BUN SERPL-MCNC: 22 MG/DL — SIGNIFICANT CHANGE UP (ref 7–23)
BUN SERPL-MCNC: 22 MG/DL — SIGNIFICANT CHANGE UP (ref 7–23)
CALCIUM SERPL-MCNC: 8.5 MG/DL — SIGNIFICANT CHANGE UP (ref 8.4–10.5)
CALCIUM SERPL-MCNC: 8.8 MG/DL — SIGNIFICANT CHANGE UP (ref 8.4–10.5)
CHLORIDE SERPL-SCNC: 108 MMOL/L — SIGNIFICANT CHANGE UP (ref 96–108)
CHLORIDE SERPL-SCNC: 110 MMOL/L — HIGH (ref 96–108)
CO2 SERPL-SCNC: 20 MMOL/L — LOW (ref 22–31)
CO2 SERPL-SCNC: 21 MMOL/L — LOW (ref 22–31)
CREAT SERPL-MCNC: 0.95 MG/DL — SIGNIFICANT CHANGE UP (ref 0.5–1.3)
CREAT SERPL-MCNC: 0.96 MG/DL — SIGNIFICANT CHANGE UP (ref 0.5–1.3)
GLUCOSE BLDC GLUCOMTR-MCNC: 112 MG/DL — HIGH (ref 70–99)
GLUCOSE BLDC GLUCOMTR-MCNC: 118 MG/DL — HIGH (ref 70–99)
GLUCOSE BLDC GLUCOMTR-MCNC: 126 MG/DL — HIGH (ref 70–99)
GLUCOSE BLDC GLUCOMTR-MCNC: 84 MG/DL — SIGNIFICANT CHANGE UP (ref 70–99)
GLUCOSE SERPL-MCNC: 110 MG/DL — HIGH (ref 70–99)
GLUCOSE SERPL-MCNC: 110 MG/DL — HIGH (ref 70–99)
HCT VFR BLD CALC: 27.3 % — LOW (ref 39–50)
HCT VFR BLD CALC: 30.3 % — LOW (ref 39–50)
HCT VFR BLD CALC: 32.2 % — LOW (ref 39–50)
HGB BLD-MCNC: 10.3 G/DL — LOW (ref 13–17)
HGB BLD-MCNC: 8.8 G/DL — LOW (ref 13–17)
HGB BLD-MCNC: 9.9 G/DL — LOW (ref 13–17)
MCHC RBC-ENTMCNC: 30.7 PG — SIGNIFICANT CHANGE UP (ref 27–34)
MCHC RBC-ENTMCNC: 30.7 PG — SIGNIFICANT CHANGE UP (ref 27–34)
MCHC RBC-ENTMCNC: 31.1 PG — SIGNIFICANT CHANGE UP (ref 27–34)
MCHC RBC-ENTMCNC: 32.1 GM/DL — SIGNIFICANT CHANGE UP (ref 32–36)
MCHC RBC-ENTMCNC: 32.2 GM/DL — SIGNIFICANT CHANGE UP (ref 32–36)
MCHC RBC-ENTMCNC: 32.6 GM/DL — SIGNIFICANT CHANGE UP (ref 32–36)
MCV RBC AUTO: 95.3 FL — SIGNIFICANT CHANGE UP (ref 80–100)
MCV RBC AUTO: 95.5 FL — SIGNIFICANT CHANGE UP (ref 80–100)
MCV RBC AUTO: 95.7 FL — SIGNIFICANT CHANGE UP (ref 80–100)
PLATELET # BLD AUTO: 458 K/UL — HIGH (ref 150–400)
PLATELET # BLD AUTO: 468 K/UL — HIGH (ref 150–400)
PLATELET # BLD AUTO: 477 K/UL — HIGH (ref 150–400)
POTASSIUM SERPL-MCNC: 5.2 MMOL/L — SIGNIFICANT CHANGE UP (ref 3.5–5.3)
POTASSIUM SERPL-MCNC: 6 MMOL/L — HIGH (ref 3.5–5.3)
POTASSIUM SERPL-SCNC: 5.2 MMOL/L — SIGNIFICANT CHANGE UP (ref 3.5–5.3)
POTASSIUM SERPL-SCNC: 6 MMOL/L — HIGH (ref 3.5–5.3)
PROT SERPL-MCNC: 6 G/DL — SIGNIFICANT CHANGE UP (ref 6–8.3)
PROT SERPL-MCNC: 6.4 G/DL — SIGNIFICANT CHANGE UP (ref 6–8.3)
RBC # BLD: 2.87 M/UL — LOW (ref 4.2–5.8)
RBC # BLD: 3.17 M/UL — LOW (ref 4.2–5.8)
RBC # BLD: 3.37 M/UL — LOW (ref 4.2–5.8)
RBC # FLD: 15.7 % — HIGH (ref 10.3–14.5)
RBC # FLD: 15.8 % — HIGH (ref 10.3–14.5)
RBC # FLD: 15.9 % — HIGH (ref 10.3–14.5)
SODIUM SERPL-SCNC: 138 MMOL/L — SIGNIFICANT CHANGE UP (ref 135–145)
SODIUM SERPL-SCNC: 139 MMOL/L — SIGNIFICANT CHANGE UP (ref 135–145)
TACROLIMUS SERPL-MCNC: 7.9 NG/ML — SIGNIFICANT CHANGE UP
WBC # BLD: 23.1 K/UL — HIGH (ref 3.8–10.5)
WBC # BLD: 23.2 K/UL — HIGH (ref 3.8–10.5)
WBC # BLD: 23.4 K/UL — HIGH (ref 3.8–10.5)
WBC # FLD AUTO: 23.1 K/UL — HIGH (ref 3.8–10.5)
WBC # FLD AUTO: 23.2 K/UL — HIGH (ref 3.8–10.5)
WBC # FLD AUTO: 23.4 K/UL — HIGH (ref 3.8–10.5)

## 2018-03-08 PROCEDURE — 93451 RIGHT HEART CATH: CPT | Mod: 26,59

## 2018-03-08 PROCEDURE — 88342 IMHCHEM/IMCYTCHM 1ST ANTB: CPT | Mod: 26

## 2018-03-08 PROCEDURE — 71045 X-RAY EXAM CHEST 1 VIEW: CPT | Mod: 26

## 2018-03-08 PROCEDURE — 93505 ENDOMYOCARDIAL BIOPSY: CPT | Mod: 26

## 2018-03-08 PROCEDURE — 99232 SBSQ HOSP IP/OBS MODERATE 35: CPT

## 2018-03-08 PROCEDURE — 88307 TISSUE EXAM BY PATHOLOGIST: CPT | Mod: 26

## 2018-03-08 PROCEDURE — 99233 SBSQ HOSP IP/OBS HIGH 50: CPT

## 2018-03-08 PROCEDURE — 88341 IMHCHEM/IMCYTCHM EA ADD ANTB: CPT | Mod: 26

## 2018-03-08 RX ORDER — ATOVAQUONE 750 MG/5ML
1500 SUSPENSION ORAL DAILY
Qty: 0 | Refills: 0 | Status: DISCONTINUED | OUTPATIENT
Start: 2018-03-09 | End: 2018-04-10

## 2018-03-08 RX ORDER — DILTIAZEM HCL 120 MG
120 CAPSULE, EXT RELEASE 24 HR ORAL DAILY
Qty: 0 | Refills: 0 | Status: DISCONTINUED | OUTPATIENT
Start: 2018-03-08 | End: 2018-03-13

## 2018-03-08 RX ORDER — ENOXAPARIN SODIUM 100 MG/ML
80 INJECTION SUBCUTANEOUS EVERY 12 HOURS
Qty: 0 | Refills: 0 | Status: DISCONTINUED | OUTPATIENT
Start: 2018-03-08 | End: 2018-03-13

## 2018-03-08 RX ORDER — TACROLIMUS 5 MG/1
8 CAPSULE ORAL
Qty: 0 | Refills: 0 | Status: DISCONTINUED | OUTPATIENT
Start: 2018-03-08 | End: 2018-03-15

## 2018-03-08 RX ORDER — FUROSEMIDE 40 MG
40 TABLET ORAL DAILY
Qty: 0 | Refills: 0 | Status: DISCONTINUED | OUTPATIENT
Start: 2018-03-08 | End: 2018-03-09

## 2018-03-08 RX ADMIN — Medication 3 MILLILITER(S): at 11:10

## 2018-03-08 RX ADMIN — CEFEPIME 100 MILLIGRAM(S): 1 INJECTION, POWDER, FOR SOLUTION INTRAMUSCULAR; INTRAVENOUS at 22:25

## 2018-03-08 RX ADMIN — Medication 500000 UNIT(S): at 23:57

## 2018-03-08 RX ADMIN — TACROLIMUS 8 MILLIGRAM(S): 5 CAPSULE ORAL at 19:55

## 2018-03-08 RX ADMIN — Medication 3 MILLILITER(S): at 05:38

## 2018-03-08 RX ADMIN — TACROLIMUS 7 MILLIGRAM(S): 5 CAPSULE ORAL at 08:14

## 2018-03-08 RX ADMIN — Medication 120 MILLIGRAM(S): at 13:18

## 2018-03-08 RX ADMIN — Medication 650 MILLIGRAM(S): at 11:56

## 2018-03-08 RX ADMIN — PANTOPRAZOLE SODIUM 40 MILLIGRAM(S): 20 TABLET, DELAYED RELEASE ORAL at 05:48

## 2018-03-08 RX ADMIN — Medication 650 MILLIGRAM(S): at 11:12

## 2018-03-08 RX ADMIN — Medication 3 MILLILITER(S): at 17:23

## 2018-03-08 RX ADMIN — VALGANCICLOVIR 450 MILLIGRAM(S): 450 TABLET, FILM COATED ORAL at 08:13

## 2018-03-08 RX ADMIN — Medication 650 MILLIGRAM(S): at 20:08

## 2018-03-08 RX ADMIN — Medication 150 MILLIGRAM(S): at 00:05

## 2018-03-08 RX ADMIN — Medication 50 MILLIGRAM(S): at 11:10

## 2018-03-08 RX ADMIN — Medication 100 MILLIGRAM(S): at 05:44

## 2018-03-08 RX ADMIN — Medication 500000 UNIT(S): at 11:10

## 2018-03-08 RX ADMIN — Medication 15 MILLIGRAM(S): at 08:14

## 2018-03-08 RX ADMIN — Medication 650 MILLIGRAM(S): at 20:38

## 2018-03-08 RX ADMIN — CEFEPIME 100 MILLIGRAM(S): 1 INJECTION, POWDER, FOR SOLUTION INTRAMUSCULAR; INTRAVENOUS at 05:35

## 2018-03-08 RX ADMIN — MYCOPHENOLATE MOFETIL 1000 MILLIGRAM(S): 250 CAPSULE ORAL at 19:55

## 2018-03-08 RX ADMIN — MYCOPHENOLATE MOFETIL 1000 MILLIGRAM(S): 250 CAPSULE ORAL at 08:14

## 2018-03-08 RX ADMIN — PANTOPRAZOLE SODIUM 40 MILLIGRAM(S): 20 TABLET, DELAYED RELEASE ORAL at 17:23

## 2018-03-08 RX ADMIN — Medication 12.5 MILLIGRAM(S): at 19:56

## 2018-03-08 RX ADMIN — CEFEPIME 100 MILLIGRAM(S): 1 INJECTION, POWDER, FOR SOLUTION INTRAMUSCULAR; INTRAVENOUS at 13:18

## 2018-03-08 RX ADMIN — Medication 40 MILLIGRAM(S): at 16:20

## 2018-03-08 RX ADMIN — Medication 3 MILLILITER(S): at 23:58

## 2018-03-08 RX ADMIN — Medication 150 MILLIGRAM(S): at 17:23

## 2018-03-08 RX ADMIN — Medication 1 TABLET(S): at 11:10

## 2018-03-08 RX ADMIN — VALGANCICLOVIR 450 MILLIGRAM(S): 450 TABLET, FILM COATED ORAL at 19:55

## 2018-03-08 RX ADMIN — ENOXAPARIN SODIUM 80 MILLIGRAM(S): 100 INJECTION SUBCUTANEOUS at 22:25

## 2018-03-08 RX ADMIN — Medication 500000 UNIT(S): at 17:23

## 2018-03-08 RX ADMIN — Medication 500000 UNIT(S): at 05:38

## 2018-03-08 NOTE — PROGRESS NOTE ADULT - SUBJECTIVE AND OBJECTIVE BOX
Interval History:  No acute events overnight noted. Pt had 2nd biopsy this am with [facility].     Medications:  acetaminophen   Tablet. 650 milliGRAM(s) Oral every 6 hours PRN  ALBUTerol/ipratropium for Nebulization 3 milliLiter(s) Nebulizer every 6 hours  cefepime  IVPB      cefepime  IVPB 2000 milliGRAM(s) IV Intermittent every 8 hours  docusate sodium 100 milliGRAM(s) Oral three times a day  insulin lispro (HumaLOG) corrective regimen sliding scale   SubCutaneous three times a day before meals  insulin lispro (HumaLOG) corrective regimen sliding scale   SubCutaneous at bedtime  metoprolol succinate ER 50 milliGRAM(s) Oral daily  mycophenolate mofetil 1000 milliGRAM(s) Oral <User Schedule>  nystatin    Suspension 490005 Unit(s) Oral every 6 hours  pantoprazole  Injectable 40 milliGRAM(s) IV Push two times a day  predniSONE   Tablet 15 milliGRAM(s) Oral <User Schedule>  tacrolimus 7 milliGRAM(s) Oral <User Schedule>  trimethoprim   80 mG/sulfamethoxazole 400 mG 1 Tablet(s) Oral daily  valGANciclovir 450 milliGRAM(s) Oral every 12 hours  vancomycin  IVPB 750 milliGRAM(s) IV Intermittent every 12 hours    Vitals:  T(C): 36.7 (03-08-18 @ 05:50), Max: 36.9 (03-07-18 @ 11:51)  HR: 82 (03-08-18 @ 05:50) (82 - 101)  BP: 140/78 (03-08-18 @ 05:50) (122/75 - 172/88)  BP(mean): 112 (03-07-18 @ 16:26) (112 - 112)  RR: 18 (03-08-18 @ 05:50) (18 - 18)  SpO2: 96% (03-08-18 @ 05:50) (94% - 97%)    Daily     Daily         I&O's Summary    07 Mar 2018 07:01  -  08 Mar 2018 07:00  --------------------------------------------------------  IN: 1210 mL / OUT: 2600 mL / NET: -1390 mL        Physical Exam:  Appearance: No Acute Distress  HEENT: No JVD  Cardiovascular: Normal S1 S2, No murmurs/rubs/gallops  Respiratory: Clear to auscultation bilaterally  Gastrointestinal: Soft, Non-tender	  Skin: No cyanosis	  Neurologic: Non-focal  Extremities: No LE edema  Psychiatry: A & O x 3, Mood & affect appropriate    Labs:                        8.8    23.4  )-----------( 468      ( 08 Mar 2018 09:19 )             27.3     03-08    138  |  110<H>  |  22  ----------------------------<  110<H>  6.0<H>   |  20<L>  |  0.95    Ca    8.5      08 Mar 2018 08:09    TPro  6.0  /  Alb  2.9<L>  /  TBili  0.4  /  DBili  x   /  AST  25  /  ALT  29  /  AlkPhos  76  03-08          Serum Pro-Brain Natriuretic Peptide: 8160 pg/mL (03-02 @ 03:34)      TELEMETRY:  Sinus, Sinus Tach: 80-100s    [ ] Echocardiogram:

## 2018-03-08 NOTE — CHART NOTE - NSCHARTNOTEFT_GEN_A_CORE
Source: Patient [ x ]    Family [ ]     other [ x ] Comprehensive chart review, RN     Pt seen for nutrition follow up. Reports good appetite and eating 100% of meals, continues to enjoy Ensure Enlive. Pt reports no GI distress at this time. Pt receptive to review of heart healthy diet guidelines and food safety for transplant recipients.     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 Noted per ID pt with right lower lobe atelectasis vs early PNA; s/p bronchoscopy- no mucus plug and atelectasis improving. Pt s/p biopsy, showed CMR 1R, AMR 1 (no cellular injury)    Diet : regular, low fiber, no concentrated potassium, Ensure Enlive 3 cans/day    Admission Weight: 78.9kg  Current Weight: 80.5kg  Weight fluctuations noted, likely due to fluid shifts. Pt with 2+generalized and 3+right arm/wrist/hand edema.     Pertinent Medications: MEDICATIONS  (STANDING):  ALBUTerol/ipratropium for Nebulization 3 milliLiter(s) Nebulizer every 6 hours  cefepime  IVPB      cefepime  IVPB 2000 milliGRAM(s) IV Intermittent every 8 hours  diltiazem    milliGRAM(s) Oral daily  docusate sodium 100 milliGRAM(s) Oral three times a day  furosemide    Tablet 40 milliGRAM(s) Oral daily  insulin lispro (HumaLOG) corrective regimen sliding scale   SubCutaneous three times a day before meals  insulin lispro (HumaLOG) corrective regimen sliding scale   SubCutaneous at bedtime  mycophenolate mofetil 1000 milliGRAM(s) Oral <User Schedule>  nystatin    Suspension 072841 Unit(s) Oral every 6 hours  pantoprazole  Injectable 40 milliGRAM(s) IV Push two times a day  predniSONE   Tablet 15 milliGRAM(s) Oral <User Schedule>  tacrolimus 7 milliGRAM(s) Oral <User Schedule>  valGANciclovir 450 milliGRAM(s) Oral every 12 hours  vancomycin  IVPB 750 milliGRAM(s) IV Intermittent every 12 hours    MEDICATIONS  (PRN):  acetaminophen   Tablet. 650 milliGRAM(s) Oral every 6 hours PRN Mild Pain (1 - 3)    Pertinent Labs:    Complete Blood Count (03.08.18 @ 13:42)    Hemoglobin: 10.3 g/dL - low    Hematocrit: 32.2 % - low  Comprehensive Metabolic Panel (03.08.18 @ 13:42)    Sodium, Serum: 139 mmol/L    Potassium, Serum: 5.2 mmol/L    Chloride, Serum: 108 mmol/L    Carbon Dioxide, Serum: 21 mmol/L - low    Anion Gap, Serum: 10 mmol/L    Blood Urea Nitrogen, Serum: 22 mg/dL    Creatinine, Serum: 0.96 mg/dL    Glucose, Serum: 110 mg/dL - high    Calcium, Total Serum: 8.8 mg/dL    Protein Total, Serum: 6.4 g/dL    Albumin, Serum: 3.1 g/dL - low    Bilirubin Total, Serum: 0.5 mg/dL    Alkaline Phosphatase, Serum: 87 U/L    Aspartate Aminotransferase (AST/SGOT): 30 U/L    Alanine Aminotransferase (ALT/SGPT): 29 U/L RC     Point of Care Testing BG: (3/8)84,126, (3/7)102,103       Skin: No pressure ulcers noted.    Estimated Needs:   [ x ] no change since previous assessment  [ ] recalculated:       Previous Nutrition Diagnosis:   Limited adherence to nutrition related recommendations being addressed with continued diet education.   Inadequate oral intake and Increased Nutrient Needs being addressed with PO diet and supplements.      New Nutrition Diagnosis: [ x ] not applicable    Interventions:     Recommend  1. Encourage pt to maintain good PO intake.   2. Provide food preferences within therapeutic diet when requested.   3. Reviewed/reinforced food safety and heart healthy diet guidelines.       Monitoring and Evaluation:     [ x ] PO intake [ x ] Tolerance to diet prescription [ x ] weights [ x ] follow up per protocol    [ ] other:

## 2018-03-08 NOTE — PROGRESS NOTE ADULT - PROBLEM SELECTOR PLAN 2
sp  heart transplant  2/23  on rejection meds with HF following,  LVAD site   with VAC in place for drainage  3/8 Prograf lvl =7 this am- will initiate Cardizem CD 120mg po daily to potentiate Prograf  d/c toprol as per chf  R heart cath this am by Marvin Campbell- cardiac biopsy obtained- hi filling pressures noted - will start lasix 40mg po daily as per HF

## 2018-03-08 NOTE — PROGRESS NOTE ADULT - SUBJECTIVE AND OBJECTIVE BOX
VITAL SIGNS-Tele: SR     Vital Signs Last 24 Hrs  T(C): 36.7 (03-08-18 @ 05:50), Max: 36.7 (03-07-18 @ 16:26)  HR: 86 (03-08-18 @ 10:31) (82 - 99)  BP: 121/75 (03-08-18 @ 10:31) (121/75 - 161/87)  SpO2: 96% (03-08-18 @ 05:50) (94% - 96%)         03-07 @ 07:01  -  03-08 @ 07:00  --------------------------------------------------------  IN: 1210 mL / OUT: 2600 mL / NET: -1390 mL    03-08 @ 07:01  -  03-08 @ 15:30  --------------------------------------------------------  IN: 420 mL / OUT: 600 mL / NET: -180 mL    Daily     Daily     CAPILLARY BLOOD GLUCOSE  POCT Blood Glucose.: 84 mg/dL (08 Mar 2018 11:16)  POCT Blood Glucose.: 126 mg/dL (08 Mar 2018 08:05)  POCT Blood Glucose.: 102 mg/dL (07 Mar 2018 22:03)  POCT Blood Glucose.: 103 mg/dL (07 Mar 2018 16:20)        Drains:     MS         [  ] Drainage:                 L Pleural  [  ]  Drainage:                R Pleural  [  ]  Drainage:  Pacing Wires        [  ]   Settings:                                  Isolated  [  ]     PHYSICAL EXAM:  Neurology: alert and oriented x 3, nonfocal, no gross deficits  CV :  Sternal Wound :  CDI , Stable  Lungs:  Abdomen: soft, nontender, nondistended, positive bowel sounds, last bowel movement         Extremities:         acetaminophen   Tablet. 650 milliGRAM(s) Oral every 6 hours PRN  ALBUTerol/ipratropium for Nebulization 3 milliLiter(s) Nebulizer every 6 hours  cefepime  IVPB      cefepime  IVPB 2000 milliGRAM(s) IV Intermittent every 8 hours  diltiazem    milliGRAM(s) Oral daily  docusate sodium 100 milliGRAM(s) Oral three times a day  furosemide    Tablet 40 milliGRAM(s) Oral daily  insulin lispro (HumaLOG) corrective regimen sliding scale   SubCutaneous three times a day before meals  insulin lispro (HumaLOG) corrective regimen sliding scale   SubCutaneous at bedtime  mycophenolate mofetil 1000 milliGRAM(s) Oral <User Schedule>  nystatin    Suspension [id number] Unit(s) Oral every 6 hours  pantoprazole  Injectable 40 milliGRAM(s) IV Push two times a day  predniSONE   Tablet 15 milliGRAM(s) Oral <User Schedule>  tacrolimus 7 milliGRAM(s) Oral <User Schedule>  valGANciclovir 450 milliGRAM(s) Oral every 12 hours  vancomycin  IVPB 750 milliGRAM(s) IV Intermittent every 12 hours    Physical Therapy Rec:   Home  [  ]   Home w/ PT  [  ]  Rehab  [  ]  Discussed with Cardiothoracic Team at AM rounds. VITAL SIGNS-Tele: SR     Vital Signs Last 24 Hrs  T(C): 36.7 (03-08-18 @ 05:50), Max: 36.7 (03-07-18 @ 16:26)  HR: 86 (03-08-18 @ 10:31) (82 - 99)  BP: 121/75 (03-08-18 @ 10:31) (121/75 - 161/87)  SpO2: 96% (03-08-18 @ 05:50) (94% - 96%)         03-07 @ 07:01  -  03-08 @ 07:00  --------------------------------------------------------  IN: 1210 mL / OUT: 2600 mL / NET: -1390 mL    03-08 @ 07:01  -  03-08 @ 15:30  --------------------------------------------------------  IN: 420 mL / OUT: 600 mL / NET: -180 mL    Daily     Daily     CAPILLARY BLOOD GLUCOSE  POCT Blood Glucose.: 84 mg/dL (08 Mar 2018 11:16)  POCT Blood Glucose.: 126 mg/dL (08 Mar 2018 08:05)  POCT Blood Glucose.: 102 mg/dL (07 Mar 2018 22:03)  POCT Blood Glucose.: 103 mg/dL (07 Mar 2018 16:20)        Drains:     MS         [  ] Drainage:                 L Pleural  [  ]  Drainage:                R Pleural  [  ]  Drainage:  Pacing Wires        [  ]   Settings:                                  Isolated  [  ]     PHYSICAL EXAM:  Neurology: alert and oriented x 3, nonfocal, no gross deficits  CV : S1S2  Sternal Wound :  CDI , Stable  Lungs: CTA  Abdomen: soft, nontender, nondistended, positive bowel sounds, last bowel movement         Extremities:     no edema, no calf tenderness Right 3rd digit w/ discoloration    acetaminophen   Tablet. 650 milliGRAM(s) Oral every 6 hours PRN  ALBUTerol/ipratropium for Nebulization 3 milliLiter(s) Nebulizer every 6 hours  cefepime  IVPB      cefepime  IVPB 2000 milliGRAM(s) IV Intermittent every 8 hours  diltiazem    milliGRAM(s) Oral daily  docusate sodium 100 milliGRAM(s) Oral three times a day  furosemide    Tablet 40 milliGRAM(s) Oral daily  insulin lispro (HumaLOG) corrective regimen sliding scale   SubCutaneous three times a day before meals  insulin lispro (HumaLOG) corrective regimen sliding scale   SubCutaneous at bedtime  mycophenolate mofetil 1000 milliGRAM(s) Oral <User Schedule>  nystatin    Suspension [id number] Unit(s) Oral every 6 hours  pantoprazole  Injectable 40 milliGRAM(s) IV Push two times a day  predniSONE   Tablet 15 milliGRAM(s) Oral <User Schedule>  tacrolimus 7 milliGRAM(s) Oral <User Schedule>  valGANciclovir 450 milliGRAM(s) Oral every 12 hours  vancomycin  IVPB 750 milliGRAM(s) IV Intermittent every 12 hours    Physical Therapy Rec:   Home  [  ]   Home w/ PT  [  ]  Rehab  [  ]  Discussed with Cardiothoracic Team at AM rounds.

## 2018-03-08 NOTE — PROGRESS NOTE ADULT - PROBLEM SELECTOR PLAN 1
K+=6.0 this am  Bactrim d/c'd as it causes hyperkalemi  repeat K+ @ 12pm = 5.2  will change prophylactic antibiotic to Mepron 1500mg po daily w/ food

## 2018-03-08 NOTE — PROGRESS NOTE ADULT - ASSESSMENT
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 Underwent biopsy which showed CMR 1R, AMR 1 (no cellular injury). Repeat DSA negative. Now hyperkalemic and hypertensive    #Hyperkalemia  - Rising K now 6.0  -    #Hypertension  - Started on Toprol XL 50mg yesterday.   - HR noted to decline to 80s  -     # Immunosuppression prophylaxis:  Tacrolimus - started on prograf 2/25; now on 8mg BID please check levels at 7:30 am (stat)  Continue CellCept 1000 mg PO BID  Steroids - continue taper; on prednisone 15 mg PO q12h; dose adjustment based on biopsy results.   	  # Antimicrobial prophylaxis:  Intermediate risk CMV - on oral valganciclovir 450 mg q12  Intermediate risk Toxo - on Bactrim PPx (daily dose)  PCP - on Bactrim 1 SS tab daily     # Graft function:  LV function improved; persistent mild RV dysfunction however excellent HD off inotropes  underwent biospy 3/1; positive for grade 1R/AMR 1; DSA negative  Biopsy #2 results pending  Last TTE with RV dysfunction but improved since previous    # Hypoxia:  saturating above 93% on room air;  c/w incentive spirometer    # Other prophylaxis:  Protonix 40 mg PO daily for GI prophylaxis while on steroids.    # Bilateral IJ/Subclavian thrombi  - HIT Ab positive; CHERIE negative; c/w lovenox  - appreciate vasc sx/card f/u  - plastics/hand and rheum to re-evaluate 3rd right digit for ischemia/?gout/infection  - ?role for debridement    # CAV PPx  - will start ASA when more stable  - statin prior to discharge    # ID - afebrile,  WBC declining slightly; serosanguinous drainage  - f/u cultures  - on cefepime for pseudomonas in sputum  - started on Vanc.   - Plastics and rheum consult for 3rd right digit possible infection?  - f/u ID recs.

## 2018-03-08 NOTE — PROGRESS NOTE ADULT - ASSESSMENT
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 Patient on broad federica-operative prophylaxis to Vancomycin/Meropenem/Fluconazole based upon his prior LVAD placement and prolonged hospitalization pre-transplant. Intermediate risk for CMV (seropositive); intermediate risk for toxoplasmosis (seropositive R). Improved aeration today at right lung base. He had Significant amount of sero-sanguinous drainage from old LVAD exit site but cultures are NGTD    - Plan:  Continue OI prophylaxis with Valganciclovir, Bactrim, Nystatin  Receiving Cefepime 2gm q8hr for RLL atelectasis/?pneumonia with CR pseudomonas in sputum   Placed on IV Vancomycin 750mg q 12hr given increasing WBC and drainage from old LVAD site   Image of x-ray the right middle finger suggests erosive arthritis vs. possible infection- necrosis suggests embolic event  Would ask Rheumatology and Vascular to evaluate the patient    Please send blood cultures x 2 sets  May need echo to re-evaluate valves  Weekly HCV viral load screening      Gary Lujan MD  [phone]  After 5pm/weekends [phone]

## 2018-03-08 NOTE — PROGRESS NOTE ADULT - ASSESSMENT
67 yr old male s/p  Heart transplant 2/23/18 for HF   H; GI bleed, LVAD implant, afib, anemia AICD  Intra op acute rejection of graft and Intraop IABP placed  and removed POD 1 and received plasmaphoresis x 2  2/26 UE +RT IJ inominate SC,cephalic DVT and +lt SC DVT  3/1  cardiac bx low grade rejection  3/2 bronchoscopy   neg   3/4  trf too floor,  Imipenum for low grade fevers  3/5 Sputum + PSA, ID to see pt  Vac changed to RLQ  3/6 Pt on cefepime  Remains on lovenox, planning on biopsy thursday, will need to hold   3/8 Cardiac biopsy this am - prograf level - 7- toprol d/c'd,  Cardizem CD 120mg po initiated  hi filling pressures noted on right heart cath-lasix 40mg po daily started as per HF  K+= 6 - Bactrim d/c'd - Mepron to start in am for prophylaxis  rpt k+ = 5.2-  daily prograf levels, daily CXR, bid CMP & CBC  plastics consult for ?necrotic right 3rd digit  d/c planning 67 yr old male s/p  Heart transplant 2/23/18 for HF   H; GI bleed, LVAD implant, afib, anemia AICD  Intra op acute rejection of graft and Intraop IABP placed  and removed POD 1 and received plasmaphoresis x 2  2/26 UE +RT IJ inominate SC,cephalic DVT and +lt SC DVT  3/1  cardiac bx low grade rejection  3/2 bronchoscopy   neg   3/4  trf too floor,  Imipenum for low grade fevers  3/5 Sputum + PSA, ID to see pt  Vac changed to RLQ  3/6 Pt on cefepime  Remains on lovenox, planning on biopsy thursday, will need to hold   3/8 Cardiac biopsy this am - prograf level - 7- prograf level increased to 8mg po bid  toprol d/c'd,  Cardizem CD 120mg po initiated  hi filling pressures noted on right heart cath-lasix 40mg po daily started as per HF  K+= 6 - Bactrim d/c'd - Mepron to start in am for prophylaxis  rpt k+ = 5.2-  daily prograf levels, daily CXR, bid CMP & CBC  plastics consult for ?necrotic right 3rd digit  ** if SBP remains >120-130 will increase CardizemCD to 180mg po daily tomorrow 3/9  resume lvx 80mg sq bid tonight  Cardiac biopsy results pending- depending on biopsy results - HF to adjust prednisone taper  d/c planning

## 2018-03-08 NOTE — PROGRESS NOTE ADULT - SUBJECTIVE AND OBJECTIVE BOX
PAGER:  [phone]               [facility] [id number]              EMAIL [email or web address]   OFFICE [phone]                              ********VASCULAR MEDICINE PROGRESS NOTE********                        CC:  UE DVT    INTERVAL HISTORY:    No issues.  Arms feel fine.  R upper extremity still with some mild swelling.  R 3rd digit stable.  s/p biopsy today with Dr. Campbell.     HISTORY OF PRESENT ILLNESS:  HPI:  67M PMH Chronic Systolic Heart Failure (ACC/AHA Stage D) due to NICMP s/p LVAD 6/12/17, GIB s/p Cautery (7/25/2017), known AV Malformations, Chronic Anemia due to numerous GIBs (off AC), A-Fib, VT s/p AICD. Pt admitted due to elevated LDH level of 646 for further evaluation and observation.  . Pt denies any chest pain, SOB, palpitations, N/D/V, abdominal pain, headaches, changes in vision, dizziness, pain or burning while urinating, swelling, numbness/tingling anywhere, rashes, fever, or recent travel. (01 Feb 2018 19:36)        MEDICATIONS  (STANDING):  ALBUTerol/ipratropium for Nebulization 3 milliLiter(s) Nebulizer every 6 hours  cefepime  IVPB      cefepime  IVPB 2000 milliGRAM(s) IV Intermittent every 8 hours  docusate sodium 100 milliGRAM(s) Oral three times a day  insulin lispro (HumaLOG) corrective regimen sliding scale   SubCutaneous three times a day before meals  insulin lispro (HumaLOG) corrective regimen sliding scale   SubCutaneous at bedtime  metoprolol succinate ER 50 milliGRAM(s) Oral daily  mycophenolate mofetil 1000 milliGRAM(s) Oral <User Schedule>  nystatin    Suspension [id number] Unit(s) Oral every 6 hours  pantoprazole  Injectable 40 milliGRAM(s) IV Push two times a day  predniSONE   Tablet 15 milliGRAM(s) Oral <User Schedule>  tacrolimus 7 milliGRAM(s) Oral <User Schedule>  trimethoprim   80 mG/sulfamethoxazole 400 mG 1 Tablet(s) Oral daily  valGANciclovir 450 milliGRAM(s) Oral every 12 hours  vancomycin  IVPB 750 milliGRAM(s) IV Intermittent every 12 hours    MEDICATIONS  (PRN):  acetaminophen   Tablet. 650 milliGRAM(s) Oral every 6 hours PRN Mild Pain (1 - 3)  PAST MEDICAL & SURGICAL HISTORY:  GIB (gastrointestinal bleeding)  Ventricular fibrillation: s/p AICD  PAF (paroxysmal atrial fibrillation): on xarelto  Non-Ischemic Cardiomyopathy  SVT (Supraventricular Tachycardia)  HTN  CHF (Congestive Heart Failure)  LVAD (left ventricular assist device) present  Status post left hip replacement  History of Prior Ablation Treatment: for afib  AICD (Automatic Cardioverter/Defibrillator) Present: [employer] with 1 [employer] lead4/1/09- explanted and replaced with [employer] 2 leads on 9/2/09      FAMILY HISTORY:  No pertinent family history in first degree relatives      SOCIAL HISTORY:  unchanged    REVIEW OF SYSTEMS:  CONSTITUTIONAL: No fever, weight loss, or fatigue  EYES: No eye pain, visual disturbances, or discharge  ENMT:  No difficulty hearing, tinnitus, vertigo; No sinus or throat pain  NECK: No pain or stiffness  RESPIRATORY: No cough, wheezing, chills or hemoptysis; No Shortness of Breath  CARDIOVASCULAR: No chest pain, palpitations, passing out, dizziness, or leg swelling  GASTROINTESTINAL: No abdominal or epigastric pain. No nausea, vomiting, or hematemesis; No diarrhea or constipation. No melena or hematochezia.  GENITOURINARY: No dysuria, frequency, hematuria, or incontinence  NEUROLOGICAL: No headaches, memory loss, loss of strength, numbness, or tremors  SKIN: No itching, burning, rashes, or lesions   LYMPH Nodes: No enlarged glands  ENDOCRINE: No heat or cold intolerance; No hair loss  MUSCULOSKELETAL: No joint pain or swelling; No muscle, back, or extremity pain  PSYCHIATRIC: No depression, anxiety, mood swings, or difficulty sleeping  HEME/LYMPH: No easy bruising, or bleeding gums  ALLERY AND IMMUNOLOGIC: No hives or eczema	    [x ] All others negative	  [ ] Unable to obtain    ICU Vital Signs Last 24 Hrs  T(C): 36.7 (08 Mar 2018 05:50), Max: 36.9 (07 Mar 2018 11:51)  T(F): 98.1 (08 Mar 2018 05:50), Max: 98.4 (07 Mar 2018 11:51)  HR: 86 (08 Mar 2018 10:31) (82 - 101)  BP: 121/75 (08 Mar 2018 10:31) (121/75 - 172/88)  BP(mean): 112 (07 Mar 2018 16:26) (112 - 112)  ABP: --  ABP(mean): --  RR: 18 (08 Mar 2018 05:50) (18 - 18)  SpO2: 96% (08 Mar 2018 05:50) (94% - 97%)      Appearance: Normal, on high flow oxgyen  HEENT:   Normal oral mucosa, PERRL, EOMI	  Lymphatic: No lymphadenopathy  Cardiovascular: Normal S1 S2 tachycardia.   Respiratory: clear	  Psychiatry: A & O x 3, Mood & affect appropriate  Gastrointestinal:  Soft, Non-tender, + BS	  Skin: No rashes, No ecchymoses, No cyanosis	  Neurologic: Non-focal  Extremities: RUE edema.  3rd digit R hand ischemia, motion and sensation intact.                                  8.8    23.4  )-----------( 468      ( 08 Mar 2018 09:19 )             27.3   03-08    138  |  110<H>  |  22  ----------------------------<  110<H>  6.0<H>   |  20<L>  |  0.95    Ca    8.5      08 Mar 2018 08:09    TPro  6.0  /  Alb  2.9<L>  /  TBili  0.4  /  DBili  x   /  AST  25  /  ALT  29  /  AlkPhos  76  03-08

## 2018-03-08 NOTE — PROGRESS NOTE ADULT - ATTENDING COMMENTS
s/p RHC and biopsy today:  RA 11, RV 45/13, PA 46/24 30, PCWP 17, PA 55, Marino CO 5.2, CI 2.7   Meds: Lovanox, Cellcept 1g bid, Pred 15 bid. Prograf recommendation was to increase to 8 bid based upon a level of 8.3 however patient remained on 7 bid. todays level 7.9.   Bactim changed to mepron for high k, Nystatin S and S, Valcyte 450 bid.   Vanco 750 bid, Cefipime 2Q8. Possible sources of infection include: drive line d/c, pseudomonas in sputum and ischemic digit.   BP not well controlled. 180/ in lab. 120/-170/, Afebrile.   I/O: -1.3 (off diuretics)  CXR: raised Rhemi  03-08    139  |  108  |  22  ----------------------------<  110<H>  5.2   |  21<L>  |  0.96    Ca    8.8      08 Mar 2018 13:42    TPro  6.4  /  Alb  3.1<L>  /  TBili  0.5  /  DBili  x   /  AST  30  /  ALT  29  /  AlkPhos  87  03-08                          10.3    (26) 23.2  )-----------( 477      ( 08 Mar 2018 13:42 )             32.2     Concern over  elevated filling pressures. Prograf subtherapeutic.   Plan:  Check biopsy.   Prograf 8bid, Start Diltiazem 120 to 180   Start lasix 40 QD  consider PICC line.   Marvin Campbell s/p RHC and biopsy today:  RA 11, RV 45/13, PA 46/24 30, PCWP 17, PA 55, Marino CO 5.2, CI 2.7   Meds: Lovanox, Cellcept 1g bid, Pred 15 bid. Prograf recommendation was to increase to 8 bid based upon a level of 8.3 however patient remained on 7 bid. todays level 7.9.   Bactim changed to mepron for high k, Nystatin S and S, Valcyte 450 bid.   Vanco 750 bid, Cefipime 2Q8. Possible sources of infection include: drive line d/c, pseudomonas in sputum and ischemic digit.   BP not well controlled. 180/ in lab. 120/-170/, Afebrile.   I/O: -1.3 (off diuretics)  CXR: raised Rhemi  03-08    139  |  108  |  22  ----------------------------<  110<H>  5.2   |  21<L>  |  0.96    Ca    8.8      08 Mar 2018 13:42    TPro  6.4  /  Alb  3.1<L>  /  TBili  0.5  /  DBili  x   /  AST  30  /  ALT  29  /  AlkPhos  87  03-08                          10.3    (26) 23.2  )-----------( 477      ( 08 Mar 2018 13:42 )             32.2     Concern over  elevated filling pressures. Prograf subtherapeutic.   Plan:  Plastics to see   Check biopsy.   Prograf 8bid, Start Diltiazem 120 to 180   Start lasix 40 QD  consider PICC line.   Marvin Campbell

## 2018-03-08 NOTE — PROGRESS NOTE ADULT - ASSESSMENT
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     Upper extremity DVT  - Resume Lovenox 1mg/kg BID when bleeding risk acceptable after biopsy   - elevate arms above heart level - given several pillows to place under Right arm.  Start light compression to R arm with 4 inch ace wrap from hand to axilla.    -  R 3rd digit ischemia which is stable.     Will follow with you    Thanks    Saurabh Tavarez  [id number] 67 year old man with ACC/AHA stage D chronic systolic heart failure due to NICM (LVEF 20%) s/p HM2 LVAD 6/17 c/b pump thrombus now s/p OHT 2/23 with post-op course c/b primary graft dysfuction with biventricular dysfunction s/p plasmapharesis/IVIg with improvement in LV function to 55% but with persistent RV dysfunction.     Upper extremity DVT  - Resume Lovenox 1mg/kg BID when bleeding risk acceptable after biopsy   - elevate arms above heart level - given several pillows to place under Right arm.  Start light compression to R arm with 4 inch ace wrap from hand to axilla.    Regarding R 3rd digit - seems like he has adequate perfusion  - no surrounding erythema or tenderness- would ask hand/plastics to take a look.      Will follow with you    Thanks    Saurabh Tavarez  [phone]

## 2018-03-08 NOTE — PROGRESS NOTE ADULT - SUBJECTIVE AND OBJECTIVE BOX
INFECTIOUS DISEASES FOLLOW UP-- Sujey Lujan  [phone]    This is a follow up note for this  67yMale with s/p  removal of LVAD and a heart transplant on 2/23. No interval events overnight      ROS:  CONSTITUTIONAL:  No fever, good appetite  CARDIOVASCULAR:  No chest pain or palpitations  RESPIRATORY:  No dyspnea  GASTROINTESTINAL:  No nausea, vomiting, diarrhea, or abdominal pain  GENITOURINARY:  No dysuria  NEUROLOGIC:  No headache,     Allergies    No Known Allergies    Intolerances        ANTIBIOTICS/RELEVANT:  antimicrobials  cefepime  IVPB      cefepime  IVPB 2000 milliGRAM(s) IV Intermittent every 8 hours  nystatin    Suspension 676973 Unit(s) Oral every 6 hours  valGANciclovir 450 milliGRAM(s) Oral every 12 hours  vancomycin  IVPB 750 milliGRAM(s) IV Intermittent every 12 hours    immunologic:  mycophenolate mofetil 1000 milliGRAM(s) Oral <User Schedule>  tacrolimus 8 milliGRAM(s) Oral <User Schedule>    OTHER:  acetaminophen   Tablet. 650 milliGRAM(s) Oral every 6 hours PRN  ALBUTerol/ipratropium for Nebulization 3 milliLiter(s) Nebulizer every 6 hours  diltiazem    milliGRAM(s) Oral daily  docusate sodium 100 milliGRAM(s) Oral three times a day  enoxaparin Injectable 80 milliGRAM(s) SubCutaneous every 12 hours  furosemide    Tablet 40 milliGRAM(s) Oral daily  insulin lispro (HumaLOG) corrective regimen sliding scale   SubCutaneous three times a day before meals  insulin lispro (HumaLOG) corrective regimen sliding scale   SubCutaneous at bedtime  pantoprazole  Injectable 40 milliGRAM(s) IV Push two times a day  predniSONE   Tablet 12.5 milliGRAM(s) Oral <User Schedule>      Objective:  Vital Signs Last 24 Hrs  T(C): 36.4 (08 Mar 2018 15:41), Max: 36.7 (08 Mar 2018 05:50)  T(F): 97.6 (08 Mar 2018 15:41), Max: 98.1 (08 Mar 2018 05:50)  HR: 87 (08 Mar 2018 15:41) (82 - 92)  BP: 144/78 (08 Mar 2018 15:41) (121/75 - 161/87)  BP(mean): 103 (08 Mar 2018 15:41) (103 - 103)  RR: 18 (08 Mar 2018 15:41) (18 - 18)  SpO2: 96% (08 Mar 2018 15:41) (94% - 96%)    PHYSICAL EXAM:  Constitutional:no acute distress  Eyes:WALT, EOMI  Ear/Nose/Throat: no oral lesions, 	  Respiratory: clear BL anteriorly, remains decreased at the right base  old LVAD site with dressing underneath large deep opening with some whitish area  Cardiovascular: S1S2  Gastrointestinal:soft, (+) BS, no tenderness  Extremities: swelling of right arm (known clot)  right middle finger with tip dusky/necrotic apperaing  No Lymphadenopathy  IV sites not inflammed.    LABS:                        10.3   23.2  )-----------( 477      ( 08 Mar 2018 13:42 )             32.2     03-08    139  |  108  |  22  ----------------------------<  110<H>  5.2   |  21<L>  |  0.96    Ca    8.8      08 Mar 2018 13:42    TPro  6.4  /  Alb  3.1<L>  /  TBili  0.5  /  DBili  x   /  AST  30  /  ALT  29  /  AlkPhos  87  03-08          MICROBIOLOGY:      Culture - Other (03.05.18 @ 17:59)    Specimen Source: Skin LVAD driveline site    Culture Results:   No growth at 48 hours    Culture - Sputum . (03.02.18 @ 16:32)    -  Amikacin: S <=8    -  Ceftazidime: S 4    -  Gentamicin: S 4    -  Cefepime: S 4    -  Piperacillin/Tazobactam: S <=8    -  Tobramycin: S <=2    -  Aztreonam: S 8    -  Ciprofloxacin: S <=0.5    -  Imipenem: R >8    Gram Stain:   No polymorphonuclear leukocytes per low power field  Rare Squamous epithelial cells per low power field  Rare Gram Positive Cocci in Clusters per oil power field  Rare Gram Negative Rods per oil power field    -  Levofloxacin: S <=1    -  Meropenem: I 4    Specimen Source: .Sputum Sputum    Culture Results:   Few Pseudomonas aeruginosa (Carbapenem Resistant)  Normal Respiratory [place] present    Organism Identification: Pseudomonas aeruginosa (Carbapenem Resistant)    Organism: Pseudomonas aeruginosa (Carbapenem Resistant)    Method Type: KAMILA          RECENT CULTURES:  03-05 @ 17:59  Skin LVAD driveline site  --  --  --    No growth at 48 hours  --  03-02 @ 16:32  .Sputum Sputum  Pseudomonas aeruginosa (Carbapenem Resistant)  Pseudomonas aeruginosa (Carbapenem Resistant)  KAMILA    Few Pseudomonas aeruginosa (Carbapenem Resistant)  Normal Respiratory [place] present  --  03-02 @ 06:41  .Blood Blood-Peripheral  --  --  --    No growth at 5 days.  --  03-01 @ 19:48  .Blood Blood  --  --  --    No growth at 5 days.  --      RADIOLOGY & ADDITIONAL STUDIES:    < from: Xray Chest 1 View- PORTABLE-Routine (03.08.18 @ 12:17) >  Impression: Right pleural effusion, possible right base atelectasis.    < end of copied text >    < from: Xray Hand 2 Views, Right (03.07.18 @ 16:57) >    IMPRESSION:     Severe radiocarpal, STT, basilar joint, first MCP, second PIP, and third   DIP arthrosis. Suspected erosions at the radiocarpal joint and at the   second PIP and third DIP joint which may be related to the erosive form   of osteoarthritis, although, superimposed infection cannot be excluded.   Clinical correlation is needed.    < end of copied text >

## 2018-03-09 ENCOUNTER — TRANSCRIPTION ENCOUNTER (OUTPATIENT)
Age: 68
End: 2018-03-09

## 2018-03-09 LAB
ALBUMIN SERPL ELPH-MCNC: 2.9 G/DL — LOW (ref 3.3–5)
ALP SERPL-CCNC: 77 U/L — SIGNIFICANT CHANGE UP (ref 40–120)
ALT FLD-CCNC: 28 U/L RC — SIGNIFICANT CHANGE UP (ref 10–45)
ANION GAP SERPL CALC-SCNC: 11 MMOL/L — SIGNIFICANT CHANGE UP (ref 5–17)
ANION GAP SERPL CALC-SCNC: 8 MMOL/L — SIGNIFICANT CHANGE UP (ref 5–17)
AST SERPL-CCNC: 25 U/L — SIGNIFICANT CHANGE UP (ref 10–40)
BILIRUB SERPL-MCNC: 0.4 MG/DL — SIGNIFICANT CHANGE UP (ref 0.2–1.2)
BUN SERPL-MCNC: 21 MG/DL — SIGNIFICANT CHANGE UP (ref 7–23)
BUN SERPL-MCNC: 22 MG/DL — SIGNIFICANT CHANGE UP (ref 7–23)
CALCIUM SERPL-MCNC: 8.3 MG/DL — LOW (ref 8.4–10.5)
CALCIUM SERPL-MCNC: 8.5 MG/DL — SIGNIFICANT CHANGE UP (ref 8.4–10.5)
CHLORIDE SERPL-SCNC: 104 MMOL/L — SIGNIFICANT CHANGE UP (ref 96–108)
CHLORIDE SERPL-SCNC: 106 MMOL/L — SIGNIFICANT CHANGE UP (ref 96–108)
CO2 SERPL-SCNC: 20 MMOL/L — LOW (ref 22–31)
CO2 SERPL-SCNC: 25 MMOL/L — SIGNIFICANT CHANGE UP (ref 22–31)
CREAT SERPL-MCNC: 1.16 MG/DL — SIGNIFICANT CHANGE UP (ref 0.5–1.3)
CREAT SERPL-MCNC: 1.19 MG/DL — SIGNIFICANT CHANGE UP (ref 0.5–1.3)
GLUCOSE BLDC GLUCOMTR-MCNC: 108 MG/DL — HIGH (ref 70–99)
GLUCOSE BLDC GLUCOMTR-MCNC: 110 MG/DL — HIGH (ref 70–99)
GLUCOSE BLDC GLUCOMTR-MCNC: 111 MG/DL — HIGH (ref 70–99)
GLUCOSE BLDC GLUCOMTR-MCNC: 119 MG/DL — HIGH (ref 70–99)
GLUCOSE BLDC GLUCOMTR-MCNC: 125 MG/DL — HIGH (ref 70–99)
GLUCOSE SERPL-MCNC: 108 MG/DL — HIGH (ref 70–99)
GLUCOSE SERPL-MCNC: 112 MG/DL — HIGH (ref 70–99)
HCT VFR BLD CALC: 30.2 % — LOW (ref 39–50)
HGB BLD-MCNC: 9.6 G/DL — LOW (ref 13–17)
MCHC RBC-ENTMCNC: 30.5 PG — SIGNIFICANT CHANGE UP (ref 27–34)
MCHC RBC-ENTMCNC: 32 GM/DL — SIGNIFICANT CHANGE UP (ref 32–36)
MCV RBC AUTO: 95.4 FL — SIGNIFICANT CHANGE UP (ref 80–100)
PLATELET # BLD AUTO: 449 K/UL — HIGH (ref 150–400)
POTASSIUM SERPL-MCNC: 5.1 MMOL/L — SIGNIFICANT CHANGE UP (ref 3.5–5.3)
POTASSIUM SERPL-MCNC: 5.4 MMOL/L — HIGH (ref 3.5–5.3)
POTASSIUM SERPL-SCNC: 5.1 MMOL/L — SIGNIFICANT CHANGE UP (ref 3.5–5.3)
POTASSIUM SERPL-SCNC: 5.4 MMOL/L — HIGH (ref 3.5–5.3)
PROT SERPL-MCNC: 6.4 G/DL — SIGNIFICANT CHANGE UP (ref 6–8.3)
RBC # BLD: 3.16 M/UL — LOW (ref 4.2–5.8)
RBC # FLD: 16.1 % — HIGH (ref 10.3–14.5)
SODIUM SERPL-SCNC: 137 MMOL/L — SIGNIFICANT CHANGE UP (ref 135–145)
SODIUM SERPL-SCNC: 137 MMOL/L — SIGNIFICANT CHANGE UP (ref 135–145)
TACROLIMUS SERPL-MCNC: 10.4 NG/ML — SIGNIFICANT CHANGE UP
TACROLIMUS SERPL-MCNC: 12.3 NG/ML — SIGNIFICANT CHANGE UP
VANCOMYCIN TROUGH SERPL-MCNC: 8.6 UG/ML — LOW (ref 10–20)
WBC # BLD: 23.5 K/UL — HIGH (ref 3.8–10.5)
WBC # FLD AUTO: 23.5 K/UL — HIGH (ref 3.8–10.5)

## 2018-03-09 PROCEDURE — 99232 SBSQ HOSP IP/OBS MODERATE 35: CPT

## 2018-03-09 PROCEDURE — 71045 X-RAY EXAM CHEST 1 VIEW: CPT | Mod: 26

## 2018-03-09 PROCEDURE — 99233 SBSQ HOSP IP/OBS HIGH 50: CPT

## 2018-03-09 RX ORDER — PANTOPRAZOLE SODIUM 20 MG/1
1 TABLET, DELAYED RELEASE ORAL
Qty: 30 | Refills: 5 | OUTPATIENT
Start: 2018-03-09 | End: 2018-09-04

## 2018-03-09 RX ORDER — ENOXAPARIN SODIUM 100 MG/ML
80 INJECTION SUBCUTANEOUS
Qty: 4800 | Refills: 0 | OUTPATIENT
Start: 2018-03-09 | End: 2018-04-07

## 2018-03-09 RX ORDER — DOCUSATE SODIUM 100 MG
1 CAPSULE ORAL
Qty: 270 | Refills: 0 | OUTPATIENT
Start: 2018-03-09 | End: 2018-06-06

## 2018-03-09 RX ORDER — ATOVAQUONE 750 MG/5ML
10 SUSPENSION ORAL
Qty: 300 | Refills: 5 | OUTPATIENT
Start: 2018-03-09 | End: 2018-09-04

## 2018-03-09 RX ORDER — NYSTATIN 500MM UNIT
5 POWDER (EA) MISCELLANEOUS
Qty: 600 | Refills: 0 | OUTPATIENT
Start: 2018-03-09 | End: 2018-04-07

## 2018-03-09 RX ORDER — PANTOPRAZOLE SODIUM 20 MG/1
1 TABLET, DELAYED RELEASE ORAL
Qty: 0 | Refills: 0 | COMMUNITY

## 2018-03-09 RX ORDER — TACROLIMUS 5 MG/1
8 CAPSULE ORAL
Qty: 480 | Refills: 0 | OUTPATIENT
Start: 2018-03-09 | End: 2018-04-07

## 2018-03-09 RX ORDER — MYCOPHENOLATE MOFETIL 250 MG/1
2 CAPSULE ORAL
Qty: 120 | Refills: 5 | OUTPATIENT
Start: 2018-03-09 | End: 2018-09-04

## 2018-03-09 RX ORDER — DILTIAZEM HCL 120 MG
1 CAPSULE, EXT RELEASE 24 HR ORAL
Qty: 30 | Refills: 2 | OUTPATIENT
Start: 2018-03-09 | End: 2018-06-06

## 2018-03-09 RX ORDER — VALGANCICLOVIR 450 MG/1
1 TABLET, FILM COATED ORAL
Qty: 60 | Refills: 4 | OUTPATIENT
Start: 2018-03-09 | End: 2018-08-05

## 2018-03-09 RX ORDER — FUROSEMIDE 40 MG
40 TABLET ORAL ONCE
Qty: 0 | Refills: 0 | Status: COMPLETED | OUTPATIENT
Start: 2018-03-10 | End: 2018-03-10

## 2018-03-09 RX ADMIN — VALGANCICLOVIR 450 MILLIGRAM(S): 450 TABLET, FILM COATED ORAL at 08:07

## 2018-03-09 RX ADMIN — Medication 40 MILLIGRAM(S): at 05:31

## 2018-03-09 RX ADMIN — Medication 150 MILLIGRAM(S): at 18:58

## 2018-03-09 RX ADMIN — PANTOPRAZOLE SODIUM 40 MILLIGRAM(S): 20 TABLET, DELAYED RELEASE ORAL at 17:31

## 2018-03-09 RX ADMIN — Medication 120 MILLIGRAM(S): at 06:55

## 2018-03-09 RX ADMIN — Medication 3 MILLILITER(S): at 17:38

## 2018-03-09 RX ADMIN — Medication 3 MILLILITER(S): at 05:31

## 2018-03-09 RX ADMIN — Medication 500000 UNIT(S): at 23:31

## 2018-03-09 RX ADMIN — CEFEPIME 100 MILLIGRAM(S): 1 INJECTION, POWDER, FOR SOLUTION INTRAMUSCULAR; INTRAVENOUS at 22:15

## 2018-03-09 RX ADMIN — Medication 500000 UNIT(S): at 05:33

## 2018-03-09 RX ADMIN — TACROLIMUS 8 MILLIGRAM(S): 5 CAPSULE ORAL at 19:58

## 2018-03-09 RX ADMIN — PANTOPRAZOLE SODIUM 40 MILLIGRAM(S): 20 TABLET, DELAYED RELEASE ORAL at 05:31

## 2018-03-09 RX ADMIN — Medication 500000 UNIT(S): at 17:31

## 2018-03-09 RX ADMIN — ENOXAPARIN SODIUM 80 MILLIGRAM(S): 100 INJECTION SUBCUTANEOUS at 05:30

## 2018-03-09 RX ADMIN — Medication 12.5 MILLIGRAM(S): at 19:57

## 2018-03-09 RX ADMIN — ATOVAQUONE 1500 MILLIGRAM(S): 750 SUSPENSION ORAL at 12:41

## 2018-03-09 RX ADMIN — Medication 650 MILLIGRAM(S): at 20:01

## 2018-03-09 RX ADMIN — CEFEPIME 100 MILLIGRAM(S): 1 INJECTION, POWDER, FOR SOLUTION INTRAMUSCULAR; INTRAVENOUS at 13:03

## 2018-03-09 RX ADMIN — Medication 650 MILLIGRAM(S): at 20:30

## 2018-03-09 RX ADMIN — Medication 3 MILLILITER(S): at 12:42

## 2018-03-09 RX ADMIN — Medication 150 MILLIGRAM(S): at 08:07

## 2018-03-09 RX ADMIN — Medication 500000 UNIT(S): at 12:42

## 2018-03-09 RX ADMIN — MYCOPHENOLATE MOFETIL 1000 MILLIGRAM(S): 250 CAPSULE ORAL at 19:58

## 2018-03-09 RX ADMIN — Medication 3 MILLILITER(S): at 23:31

## 2018-03-09 RX ADMIN — CEFEPIME 100 MILLIGRAM(S): 1 INJECTION, POWDER, FOR SOLUTION INTRAMUSCULAR; INTRAVENOUS at 05:29

## 2018-03-09 RX ADMIN — MYCOPHENOLATE MOFETIL 1000 MILLIGRAM(S): 250 CAPSULE ORAL at 08:06

## 2018-03-09 RX ADMIN — TACROLIMUS 8 MILLIGRAM(S): 5 CAPSULE ORAL at 08:07

## 2018-03-09 RX ADMIN — VALGANCICLOVIR 450 MILLIGRAM(S): 450 TABLET, FILM COATED ORAL at 19:57

## 2018-03-09 RX ADMIN — Medication 12.5 MILLIGRAM(S): at 08:06

## 2018-03-09 RX ADMIN — ENOXAPARIN SODIUM 80 MILLIGRAM(S): 100 INJECTION SUBCUTANEOUS at 17:31

## 2018-03-09 NOTE — CONSULT NOTE ADULT - SUBJECTIVE AND OBJECTIVE BOX
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 During this hospitalization patient found to have pump thrombus now s/p orthotopic heart transplant 2/23/18 c/b primary graft dysfunction s/p plasmapharesis/IVIG and b/l IJ and subclavian venous thrombus now on lovenox.    Patient noted to have right third digit discoloration 8 days ago, vascular consulted and requested UE arterial duplex as the finger appeared ischemic, hypothesized to be due to arterial line placement. Arterial duplex showed mild focal stenosis of right distal radial artery as well as occlusive thrombosis of left mid and distal radial artery. R hand XRAY also performed which shows radiocarpal periarticular erosion, 2nd PIP and 3rd DIP erosions w/ marginal osteophyte formation. 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    Patient endorses a history of gout for many years and reports being on medications for gout but cannot recall what medication(s) he was on or when he stopped taking these medication(s). He does not know when he last had a gout flare. He reports being seen by a rheumatologist but cannot recall his/her name. There are no rheumatology notes in [facility], however, his outpatient medication review notes that the patient was taking colchicine in Nov 2017. Patient reports his right third digit along with his right knee have been hurting for several months. He also reports hand joint swelling and stiffness for several months. He reports his gout flares are usually in his fingers. He denies fever, chills, malaise, visual changes, oral lesions, rash, trauma.      PAST MEDICAL & SURGICAL HISTORY:  GIB (gastrointestinal bleeding)  Ventricular fibrillation: s/p AICD  PAF (paroxysmal atrial fibrillation): on xarelto  Non-Ischemic Cardiomyopathy  SVT (Supraventricular Tachycardia)  HTN  CHF (Congestive Heart Failure)  LVAD (left ventricular assist device) present  Status post left hip replacement  History of Prior Ablation Treatment: for afib  AICD (Automatic Cardioverter/Defibrillator) Present: [employer] with 1 [employer] lead4/1/09- explanted and replaced with [employer] 2 leads on 9/2/09      Review of Systems:  Gen:  No fevers/chills, weight loss  HEENT: No blurry vision, no difficulty swallowing  CVS: No chest pain/palpitations  Resp: +LOBATO, no wheezing  GI: No N/V/C/D/abdominal pain  MSK: R arm swelling, R third digit pain, R knee pain  Skin: No new rashes  Neuro: No headaches      MEDICATIONS  (STANDING):  ALBUTerol/ipratropium for Nebulization 3 milliLiter(s) Nebulizer every 6 hours  atovaquone Suspension 1500 milliGRAM(s) Oral daily  cefepime  IVPB      cefepime  IVPB 2000 milliGRAM(s) IV Intermittent every 8 hours  diltiazem    milliGRAM(s) Oral daily  docusate sodium 100 milliGRAM(s) Oral three times a day  enoxaparin Injectable 80 milliGRAM(s) SubCutaneous every 12 hours  furosemide    Tablet 40 milliGRAM(s) Oral daily  insulin lispro (HumaLOG) corrective regimen sliding scale   SubCutaneous three times a day before meals  insulin lispro (HumaLOG) corrective regimen sliding scale   SubCutaneous at bedtime  mycophenolate mofetil 1000 milliGRAM(s) Oral <User Schedule>  nystatin    Suspension 102324 Unit(s) Oral every 6 hours  pantoprazole  Injectable 40 milliGRAM(s) IV Push two times a day  predniSONE   Tablet 12.5 milliGRAM(s) Oral <User Schedule>  tacrolimus 8 milliGRAM(s) Oral <User Schedule>  valGANciclovir 450 milliGRAM(s) Oral every 12 hours  vancomycin  IVPB 750 milliGRAM(s) IV Intermittent every 12 hours    MEDICATIONS  (PRN):  acetaminophen   Tablet. 650 milliGRAM(s) Oral every 6 hours PRN Mild Pain (1 - 3)      Allergies  No Known Allergies        PERTINENT MEDICATION HISTORY:    SOCIAL HISTORY: Lives w/ brother; Denies ETOH, smoking or substance use      FAMILY HISTORY:  No pertinent family history in first degree relatives      Vital Signs Last 24 Hrs  T(C): 36.2 (09 Mar 2018 03:25), Max: 36.6 (08 Mar 2018 19:33)  T(F): 97.2 (09 Mar 2018 03:25), Max: 97.9 (08 Mar 2018 19:33)  HR: 86 (09 Mar 2018 05:46) (86 - 95)  BP: 118/64 (09 Mar 2018 05:46) (99/60 - 144/78)  BP(mean): 85 (09 Mar 2018 05:46) (81 - 103)  RR: 18 (09 Mar 2018 03:25) (18 - 18)  SpO2: 99% (09 Mar 2018 03:25) (95% - 99%)    Physical Exam:  General: No apparent distress  HEENT: EOMI, MMM, no oral lesions  CVS: +S1/S2, RRR, no murmurs/rubs/gallops  Resp: CTA b/l. No crackles/wheezing  GI: Soft, NT/ND +BS  MSK:  Shoulders: wnl  Elbows: wnl  Wrists: wnl  MCPs: wnl  PIPs: Right PIPs w/ joint enlargement, warmth  DIPs: Right distal 3rd digit ischemia, volar blister; DIP w/ joint enlargement, warmth, tenderness;   Hips: wnl  Knees: B/l knee enlargement, right worse than left, right tender to palpation, FROM  Ankle: wnl  Neuro: AAOx3, strength 5/5 UE and LEs  Skin: no visible rashes      LABS:                        9.6    23.5  )-----------( 449      ( 09 Mar 2018 07:17 )             30.2     03-09    137  |  106  |  22  ----------------------------<  108<H>  5.4<H>   |  20<L>  |  1.19    Ca    8.5      09 Mar 2018 07:17    TPro  6.4  /  Alb  2.9<L>  /  TBili  0.4  /  DBili  x   /  AST  25  /  ALT  28  /  AlkPhos  77  03-09          RADIOLOGY & ADDITIONAL STUDIES:  < from: Xray Hand 2 Views, Right (03.07.18 @ 16:57) >  FINDINGS:  BONES: No acute fracture or dislocation. Redemonstration of first MCP   volar subluxation.  JOINT SPACES: Severe radiocarpal, STT, basilar, first MCP, 2nd PIP and   3rd DIP arthrosis. Radiocarpal periarticular erosions. Second PIP and   third DIP erosions with marginal osteophyte formation.  Small corticated   ossicles in the soft tissues along the dorsum of the   radiocarpal/ulnocarpal articulations.  SOFT TISSUES: Diffuse swelling. Atherosclerotic calcification.     IMPRESSION:     Severe radiocarpal, STT, basilar joint, first MCP, second PIP, and third   DIP arthrosis. Suspected erosions at the radiocarpal joint and at the   second PIP and third DIP joint which may be related to the erosive form   of osteoarthritis, although, superimposed infection cannot be excluded.   Clinical correlation is needed.    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 During this hospitalization patient found to have pump thrombus now s/p orthotopic heart transplant 2/23/18 c/b primary graft dysfunction s/p plasmapharesis/IVIG and b/l IJ and subclavian venous thrombus now on lovenox.    Patient noted to have right third digit discoloration 8 days ago, vascular consulted and requested UE arterial duplex as the finger appeared ischemic, hypothesized to be due to arterial line placement. Arterial duplex showed mild focal stenosis of right distal radial artery as well as occlusive thrombosis of left mid and distal radial artery. R hand XRAY also performed which shows radiocarpal periarticular erosion, 2nd PIP and 3rd DIP erosions w/ marginal osteophyte formation. 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    Patient endorses a history of gout for many years and reports being on medications for gout but cannot recall what medication(s) he was on or when he stopped taking these medication(s). He does not know when he last had a gout flare. He reports being seen by a rheumatologist but cannot recall his/her name. There are no rheumatology notes in [facility], however, his outpatient medication review notes that the patient was taking colchicine in Nov 2017. Patient reports his right third digit along with his right knee have been hurting for several months. He also reports hand joint swelling and stiffness for several months. He reports his gout flares are usually in his fingers. He states the right 3rd digit pain is now improved s/p drainage by plastics this AM. He denies fever, chills, malaise, visual changes, oral lesions, rash, trauma.      PAST MEDICAL & SURGICAL HISTORY:  GIB (gastrointestinal bleeding)  Ventricular fibrillation: s/p AICD  PAF (paroxysmal atrial fibrillation): on xarelto  Non-Ischemic Cardiomyopathy  SVT (Supraventricular Tachycardia)  HTN  CHF (Congestive Heart Failure)  LVAD (left ventricular assist device) present  Status post left hip replacement  History of Prior Ablation Treatment: for afib  AICD (Automatic Cardioverter/Defibrillator) Present: [employer] with 1 [employer] lead4/1/09- explanted and replaced with [employer] 2 leads on 9/2/09      Review of Systems:  Gen:  No fevers/chills, weight loss  HEENT: No blurry vision, no difficulty swallowing  CVS: No chest pain/palpitations  Resp: +LOBATO, no wheezing  GI: No N/V/C/D/abdominal pain  MSK: R arm swelling, R third digit pain, R knee pain  Skin: No new rashes  Neuro: No headaches      MEDICATIONS  (STANDING):  ALBUTerol/ipratropium for Nebulization 3 milliLiter(s) Nebulizer every 6 hours  atovaquone Suspension 1500 milliGRAM(s) Oral daily  cefepime  IVPB      cefepime  IVPB 2000 milliGRAM(s) IV Intermittent every 8 hours  diltiazem    milliGRAM(s) Oral daily  docusate sodium 100 milliGRAM(s) Oral three times a day  enoxaparin Injectable 80 milliGRAM(s) SubCutaneous every 12 hours  furosemide    Tablet 40 milliGRAM(s) Oral daily  insulin lispro (HumaLOG) corrective regimen sliding scale   SubCutaneous three times a day before meals  insulin lispro (HumaLOG) corrective regimen sliding scale   SubCutaneous at bedtime  mycophenolate mofetil 1000 milliGRAM(s) Oral <User Schedule>  nystatin    Suspension 743152 Unit(s) Oral every 6 hours  pantoprazole  Injectable 40 milliGRAM(s) IV Push two times a day  predniSONE   Tablet 12.5 milliGRAM(s) Oral <User Schedule>  tacrolimus 8 milliGRAM(s) Oral <User Schedule>  valGANciclovir 450 milliGRAM(s) Oral every 12 hours  vancomycin  IVPB 750 milliGRAM(s) IV Intermittent every 12 hours    MEDICATIONS  (PRN):  acetaminophen   Tablet. 650 milliGRAM(s) Oral every 6 hours PRN Mild Pain (1 - 3)      Allergies  No Known Allergies        PERTINENT MEDICATION HISTORY:    SOCIAL HISTORY: Lives w/ brother; Denies ETOH, smoking or substance use      FAMILY HISTORY:  No pertinent family history in first degree relatives      Vital Signs Last 24 Hrs  T(C): 36.2 (09 Mar 2018 03:25), Max: 36.6 (08 Mar 2018 19:33)  T(F): 97.2 (09 Mar 2018 03:25), Max: 97.9 (08 Mar 2018 19:33)  HR: 86 (09 Mar 2018 05:46) (86 - 95)  BP: 118/64 (09 Mar 2018 05:46) (99/60 - 144/78)  BP(mean): 85 (09 Mar 2018 05:46) (81 - 103)  RR: 18 (09 Mar 2018 03:25) (18 - 18)  SpO2: 99% (09 Mar 2018 03:25) (95% - 99%)    Physical Exam:  General: No apparent distress  HEENT: EOMI, MMM, no oral lesions  CVS: +S1/S2, RRR, no murmurs/rubs/gallops  Resp: CTA b/l. No crackles/wheezing  GI: Soft, NT/ND +BS  MSK:  Shoulders: wnl  Elbows: wnl  Wrists: wnl  MCPs: wnl  PIPs: Right 1st PIP w/ joint enlargement and swelling, no warmth or erythema  DIPs: Right 3rd digit ischemia distal to DIP, cool to touch; DIP w/ tophi oozing white matter examined under polarized light microscopy revealing needle-shaped negative birefringent crystals  Hips: wnl  Knees: B/l bony enlargement, right worse than left, right tender to palpation, no warmth or erythema  Ankle: wnl  Neuro: AAOx3, strength 5/5 UE and LEs  Skin: no visible rashes      LABS:                        9.6    23.5  )-----------( 449      ( 09 Mar 2018 07:17 )             30.2     03-09    137  |  106  |  22  ----------------------------<  108<H>  5.4<H>   |  20<L>  |  1.19    Ca    8.5      09 Mar 2018 07:17    TPro  6.4  /  Alb  2.9<L>  /  TBili  0.4  /  DBili  x   /  AST  25  /  ALT  28  /  AlkPhos  77  03-09          RADIOLOGY & ADDITIONAL STUDIES:  < from: Xray Hand 2 Views, Right (03.07.18 @ 16:57) >  FINDINGS:  BONES: No acute fracture or dislocation. Redemonstration of first MCP   volar subluxation.  JOINT SPACES: Severe radiocarpal, STT, basilar, first MCP, 2nd PIP and   3rd DIP arthrosis. Radiocarpal periarticular erosions. Second PIP and   third DIP erosions with marginal osteophyte formation.  Small corticated   ossicles in the soft tissues along the dorsum of the   radiocarpal/ulnocarpal articulations.  SOFT TISSUES: Diffuse swelling. Atherosclerotic calcification.     IMPRESSION:     Severe radiocarpal, STT, basilar joint, first MCP, second PIP, and third   DIP arthrosis. Suspected erosions at the radiocarpal joint and at the   second PIP and third DIP joint which may be related to the erosive form   of osteoarthritis, although, superimposed infection cannot be excluded.   Clinical correlation is needed.    < end of copied text >

## 2018-03-09 NOTE — DISCHARGE NOTE ADULT - HOME CARE AGENCY
[facility] ([phone]) SOC 4/4/18 [facility] ([phone]) SOC 4/11/18

## 2018-03-09 NOTE — DISCHARGE NOTE ADULT - PATIENT PORTAL LINK FT
You can access the [facility] Portal, offered by [facility], by registering with the following website: [email or web address]

## 2018-03-09 NOTE — DISCHARGE NOTE ADULT - HOSPITAL COURSE
67 year old man with ACC/AHA stage D chronic systolic heart failure due to NICM s/p HM2 LVAD 6/17 c/b pump thrombosis now s/p heart transplant on 2/23 (CMV D-/R+ and Toxo D-/R+), with post-op course c/b primary graft dysfunction, initially thought to be immune-mediated due to positive B-cell flow (negative CDC cross match) and received plasmapharesis/IVIg x2 with improvement in LV function. His course has been complicated by bilateral IJ/subclavian thrombi. He currently has acute renal failure of unclear etiology. However, he does not have clear evidence of allograft dysfunction or low output on exam. 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     # Immunosuppression:  Tacrolimus -  Will continue dosing at 9 mg BID (increased yesterday). Tacro level 7 this morning from 9.4 Target trough level of 12-14. - - cardizem  240 qd  CellCept - continue 1000 mg PO BID.   Steroids - will give short pulse of oral steroids. Will give 50 mg PO BID for three days with subsequent taper.  Taper starting 3/24 45 mg x2 the 40 x2  decrease by 10  until 15 mg bid- ordered    # Antimicrobial prophylaxis:  PCP - continue Mepron 1500mg PO daily      # Bilateral IJ/Subclavian thrombi - unchanged on f/u U/S 3/12  -   - CHERIE negative    #Acute renal failure, possibly related to vancomycin dosing, improving.   - Vanco stopped.   - No further diuresis.     # CAV PPx  - Continue ECASA 81mg PO daily  - Continue pravastatin 20mg PO QHS    # ID   - Pseudomonas in sputum - course of cefepime completed 3/15  - DLES improved and vancomycin stopped given high trough with NORMAN. Per ID, will redose with daptomycin depending upon repeat vanc level this morning.   - silver sulfadizaine dressings per plastics for R 3rd digit.   - HCV viral load and PCR per protocol. Therapy to be initiated as outpatient by Dr. Thomas (Hepatology)  - Transplant ID following      3/26 Pt for repeat UE thrombus as per HF, r/o propagation  Creat is improving may need medications adjustment  Ambulate  3/27 Biopsy now scheduled for Wednesday. April 4    UE duplex with new innominate vein partial thrombus, cont anticoagulation, now therapeutic. D/w Dr Tavarez     3/28 prograf increased to 9mg bid.  Renal function continues to improve  Will resume hep sq tonight after biopsy this am  3/29 - Prograf 0.5mg po x 1 stat & increase Prograf to 9.5mg po bid  increase CardizemCD to 300mg po daily - received CardizemCD 240 this am therefore Cardizem 60mg po x 1 given this am  CXR w/ RLL haziness - will ultrasound  R LL today  36/30 Hyperkalemia - kayexalate 30 x1  Na bicarb added  Creat improved, now on lovenox  3/31   feeling well today,  VSS, TAC level 10.0, education by transplant coordinator with family, potassium stable  5.0,   cardizem to 360 Qd  4/1    VSS   5 beats wct in NSR,    LAVD site with VAC,   WBC   16.   afebrile,   TAC level   10.1  4/2 Planning on d/c home Tuesday, vac reordered.  Tacro lvl 12 on 10 bid .  Plastics f/u ,  cont silvadene , f/u outpatient.;  4/3 Pt for d/c home today 67 year old man with ACC/AHA stage D chronic systolic heart failure due to NICM s/p HM2 LVAD 6/17 c/b pump thrombosis now s/p heart transplant on 2/23 (CMV D-/R+ and Toxo D-/R+), with post-op course c/b primary graft dysfunction, initially thought to be immune-mediated due to positive B-cell flow (negative CDC cross match) and received plasmapharesis/IVIg x2 with improvement in LV function. His course has been complicated by bilateral IJ/subclavian thrombi. He currently has acute renal failure of unclear etiology. However, he does not have clear evidence of allograft dysfunction or low output on exam. 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     # Immunosuppression:  Tacrolimus -  Will continue dosing at 9 mg BID (increased yesterday). Tacro level 7 this morning from 9.4 Target trough level of 12-14. - - cardizem  240 qd  CellCept - continue 1000 mg PO BID.   Steroids - will give short pulse of oral steroids. Will give 50 mg PO BID for three days with subsequent taper.  Taper starting 3/24 45 mg x2 the 40 x2  decrease by 10  until 15 mg bid- ordered    # Antimicrobial prophylaxis:  PCP - continue Mepron 1500mg PO daily      # Bilateral IJ/Subclavian thrombi - unchanged on f/u U/S 3/12  -   - CHERIE negative    #Acute renal failure, possibly related to vancomycin dosing, improving.   - Vanco stopped.   - No further diuresis.     # CAV PPx  - Continue ECASA 81mg PO daily  - Continue pravastatin 20mg PO QHS    # ID   - Pseudomonas in sputum - course of cefepime completed 3/15  - DLES improved and vancomycin stopped given high trough with NORMAN. Per ID, will redose with daptomycin depending upon repeat vanc level this morning.   - silver sulfadizaine dressings per plastics for R 3rd digit.   - HCV viral load and PCR per protocol. Therapy to be initiated as outpatient by Dr. Thomas (Hepatology)  - Transplant ID following      3/26 Pt for repeat UE thrombus as per HF, r/o propagation  Creat is improving may need medications adjustment  Ambulate  3/27 Biopsy now scheduled for Wednesday. April 4    UE duplex with new innominate vein partial thrombus, cont anticoagulation, now therapeutic. D/w Dr Tavarez     3/28 prograf increased to 9mg bid.  Renal function continues to improve  Will resume hep sq tonight after biopsy this am  3/29 - Prograf 0.5mg po x 1 stat & increase Prograf to 9.5mg po bid  increase CardizemCD to 300mg po daily - received CardizemCD 240 this am therefore Cardizem 60mg po x 1 given this am  CXR w/ RLL haziness - will ultrasound  R LL today  36/30 Hyperkalemia - kayexalate 30 x1  Na bicarb added  Creat improved, now on lovenox  3/31   feeling well today,  VSS, TAC level 10.0, education by transplant coordinator with family, potassium stable  5.0,   cardizem to 360 Qd  4/1    VSS   5 beats wct in NSR,    LAVD site with VAC,   WBC   16.   afebrile,   TAC level   10.1  4/2 Planning on d/c home Tuesday, vac reordered.  Tacro lvl 12 on 10 bid .  Plastics f/u ,  cont silvadene , f/u outpatient.; 4/3 D/C PLANNING  Discharge 4/3  was aborted as echocardiogram showed evidence of slight worsened LV strain, LVEF of 52%, and slightly diminished right ventricular systolic function. It was determined that he missed five doses of prednisone. RHC today showed elevated biventricular filling pressures with normal cardiac output. Biopsy showed 1R/1A cellular rejection. C4D continues to be diffusely positive. DSA is pending. His tacrolimus level is therapeutic and his renal function is acutely worse of unclear etiology.   4/5 - Rounds made with Dr. Stahl:   tacro level - 22.4 - will hold prograf this evening - & decrease dose to 9mg po bid starting 4/6 IV lasix 40mg x 1;  - with increase tac level - will decrease Cardizem CD to 240mg po daily  4/6 prograf level: 14.4 this am- d/w Dr. Stahl- prograf 5 mg given this am then 12 mg this evening; prograf 9 bid to start in am sat 4/7; VSS;  rt pleural eff- sono site; minimal fluid as per CTU Pa/NP; driveline vac changed today   4/7 GFR improved. Heparin BID changed to Lovenox bid. Magnesium oxide decreased to  once daily. Valcyte 450mg increased QD  4/8 SR  -   4/10 d/c planning

## 2018-03-09 NOTE — DISCHARGE NOTE ADULT - OTHER SIGNIFICANT FINDINGS
am i going  VITAL SIGNS    Telemetry:  sr 90    Vital Signs Last 24 Hrs  T(C): 36.6 (04-10-18 @ 15:37), Max: 36.7 (04-10-18 @ 04:10)  T(F): 97.8 (04-10-18 @ 15:37), Max: 98 (04-10-18 @ 04:10)  HR: 106 (04-10-18 @ 15:37) (100 - 110)  BP: 121/82 (04-10-18 @ 15:37) (121/81 - 141/79)  RR: 18 (04-10-18 @ 15:37) (18 - 18)  SpO2: 99% (04-10-18 @ 15:37) (97% - 100%)                    @ 07:01  -  04-10 @ 07:00  --------------------------------------------------------  IN: 1140 mL / OUT: 1600 mL / NET: -460 mL    04-10 @ 07:01  -  04-10 @ 15:54  --------------------------------------------------------  IN: 780 mL / OUT: 250 mL / NET: 530 mL          Daily     Daily Weight in k.7 (10 Apr 2018 04:10)        CAPILLARY BLOOD GLUCOSE      POCT Blood Glucose.: 159 mg/dL (10 Apr 2018 11:30)  POCT Blood Glucose.: 129 mg/dL (10 Apr 2018 07:22)  POCT Blood Glucose.: 134 mg/dL (2018 22:03)  POCT Blood Glucose.: 126 mg/dL (2018 18:33)        Coumadin    [ ] YES          [  x]      NO                           PHYSICAL EXAM        Neurology: alert and oriented x 3, nonfocal, no gross deficits  CV : .S1S2 RRR  Sternal Wound :  CDI , Stable  Lungs: diminished RLL  Abdomen: soft, nontender, nondistended, positive bowel sounds, last bowel movement +  :         voiding    Extremities:   - edema - calve tenderness            acetaminophen   Tablet. 650 milliGRAM(s) Oral every 6 hours PRN  ALBUTerol/ipratropium for Nebulization 3 milliLiter(s) Nebulizer every 6 hours  aspirin enteric coated 81 milliGRAM(s) Oral daily  atovaquone Suspension 1500 milliGRAM(s) Oral daily  Calcium Citrate + Vit D, 315 mg/200 Unit 2 Tablet(s) 2 Tablet(s) Oral two times a day  diltiazem    milliGRAM(s) Oral daily  docusate sodium 100 milliGRAM(s) Oral three times a day  enoxaparin Injectable 70 milliGRAM(s) SubCutaneous <User Schedule>  famotidine    Tablet 20 milliGRAM(s) Oral daily  insulin lispro (HumaLOG) corrective regimen sliding scale   SubCutaneous three times a day before meals  insulin lispro (HumaLOG) corrective regimen sliding scale   SubCutaneous at bedtime  lidocaine 2% Injectable 20 milliLiter(s) Local Injection once  magnesium oxide 1200 milliGRAM(s) Oral <User Schedule>  multivitamin 1 Tablet(s) Oral daily  mycophenolate mofetil 1000 milliGRAM(s) Oral <User Schedule>  nystatin    Suspension 500398 Unit(s) Oral every 6 hours  pravastatin 20 milliGRAM(s) Oral at bedtime  predniSONE   Tablet 15 milliGRAM(s) Oral <User Schedule>  silver sulfADIAZINE 1% Cream 1 Application(s) Topical two times a day  sodium bicarbonate 650 milliGRAM(s) Oral three times a day  tacrolimus 9 milliGRAM(s) Oral <User Schedule>  tacrolimus 10 milliGRAM(s) Oral <User Schedule>  valGANciclovir 450 milliGRAM(s) Oral <User Schedule>      Discussed with Cardiothoracic Team at AM rounds.

## 2018-03-09 NOTE — DISCHARGE NOTE ADULT - MEDICATION SUMMARY - MEDICATIONS TO STOP TAKING
I will STOP taking the medications listed below when I get home from the hospital:    pantoprazole 40 mg oral delayed release tablet  -- 1 tab(s) by mouth once a day    acetaminophen 325 mg oral tablet  -- 2 tab(s) by mouth every 6 hours, As needed, Moderate Pain (4 - 6)    amiodarone 200 mg oral tablet  -- 1 tab(s) by mouth once a day    mexiletine 150 mg oral capsule  -- 1 cap(s) by mouth every 8 hours    colchicine 0.6 mg oral tablet  -- 1 tab(s) by mouth 2 times a day    QUEtiapine 50 mg oral tablet  -- 1 tab(s) by mouth once a day    carvedilol 12.5 mg oral tablet  -- 1 tab(s) by mouth every 12 hours    furosemide 20 mg oral tablet  -- 1 tab(s) by mouth every other day    spironolactone 25 mg oral tablet  -- 1 tab(s) by mouth once a day    FeroSul 325 mg (65 mg elemental iron) oral tablet  -- 1  by mouth 3 times a day    hydrALAZINE 25 mg oral tablet  -- 1 tab(s) by mouth every 8 hours    ascorbic acid 500 mg oral tablet  -- 1 tab(s) by mouth once a day    folic acid 1 mg oral tablet  -- 1 tab(s) by mouth once a day

## 2018-03-09 NOTE — DISCHARGE NOTE ADULT - MEDICATION SUMMARY - MEDICATIONS TO TAKE
I will START or STAY ON the medications listed below when I get home from the hospital:    predniSONE 5 mg oral tablet  -- 4 tab(s) by mouth 2 times a day   -- It is very important that you take or use this exactly as directed.  Do not skip doses or discontinue unless directed by your doctor.  Obtain medical advice before taking any non-prescription drugs as some may affect the action of this medication.  Take with food or milk.    -- Indication: For Immunosuppressed status    aspirin 81 mg oral delayed release tablet  -- 1 tab(s) by mouth once a day  -- Indication: For Antiplatelet    sodium bicarbonate 650 mg oral tablet  -- 1 tab(s) by mouth 3 times a day  -- Indication: For Hyperkalemia    Cardizem  mg/24 hours oral capsule, extended release  -- 2 cap(s) by mouth once a day   -- It is very important that you take or use this exactly as directed.  Do not skip doses or discontinue unless directed by your doctor.  Some non-prescription drugs may aggravate your condition.  Read all labels carefully.  If a warning appears, check with your doctor before taking.  Swallow whole.  Do not crush.    -- Indication: For blood pressure    enoxaparin 80 mg/0.8 mL injectable solution  -- 80 milligram(s) subcutaneously 2 times a day   -- It is very important that you take or use this exactly as directed.  Do not skip doses or discontinue unless directed by your doctor.    -- Indication: For Anticoagulation    nystatin 100,000 units/mL oral suspension  -- 5 milliliter(s) by mouth 4 times a day   -- Finish all this medication unless otherwise directed by prescriber.  Shake well before use.    -- Indication: For Antifungal    pravastatin 20 mg oral tablet  -- 1 tab(s) by mouth once a day (at bedtime)  -- Indication: For CHolesterol    valGANciclovir 450 mg oral tablet  -- 1 tab(s) by mouth once a day   -- Do not chew, break, or crush.  Do not take this drug if you are pregnant.  May cause drowsiness or dizziness.  Obtain medical advice before taking any non-prescription drugs as some may affect the action of this medication.  Take with food or milk.  Take with food.  This drug may impair the ability to drive or operate machinery.  Use care until you become familiar with its effects.    -- Indication: For Cmv prophylaxis    famotidine 20 mg oral tablet  -- 1 tab(s) by mouth once a day   -- Indication: For GI prophylaxis    mycophenolate mofetil 500 mg oral tablet  -- 2 tab(s) by mouth 2 times a day   -- Do not take this drug if you are pregnant.    -- Indication: For Immunosuppressed status    tacrolimus 1 mg oral capsule  -- 10 cap(s) by mouth 2 times a day   -- Indication: For Immunosuppressed status    docusate sodium 100 mg oral capsule  -- 1 cap(s) by mouth 3 times a day  -- Indication: For Laxative    magnesium oxide 400 mg (241.3 mg elemental magnesium) oral tablet  -- 2 tab(s) by mouth 2 times a day x 30 days  x 30 days   -- Indication: For supplement    atovaquone 750 mg/5 mL oral suspension  -- 10 milliliter(s) by mouth once a day  -- Indication: For Prophylaxis    Multiple Vitamins oral tablet  -- 1 tab(s) by mouth once a day  -- Indication: For supplement    Citracal + D 315 mg-250 intl units oral tablet  -- 2 tab(s) by mouth 2 times a day   -- Indication: For supplement I will START or STAY ON the medications listed below when I get home from the hospital:    predniSONE 5 mg oral tablet  -- 3 tab(s) by mouth 2 times a day   -- It is very important that you take or use this exactly as directed.  Do not skip doses or discontinue unless directed by your doctor.  Obtain medical advice before taking any non-prescription drugs as some may affect the action of this medication.  Take with food or milk.    -- Indication: For steroids    aspirin 81 mg oral delayed release tablet  -- 1 tab(s) by mouth once a day  -- Indication: For Antiplatelet    sodium bicarbonate 650 mg oral tablet  -- 1 tab(s) by mouth 3 times a day  -- Indication: For Hyperkalemia    Cardizem  mg/24 hours oral capsule, extended release  -- 1 cap(s) by mouth once a day   -- It is very important that you take or use this exactly as directed.  Do not skip doses or discontinue unless directed by your doctor.  Some non-prescription drugs may aggravate your condition.  Read all labels carefully.  If a warning appears, check with your doctor before taking.  Swallow whole.  Do not crush.    -- Indication: For Blood pressure    enoxaparin 80 mg/0.8 mL injectable solution  -- 80 milligram(s) subcutaneously 2 times a day   -- It is very important that you take or use this exactly as directed.  Do not skip doses or discontinue unless directed by your doctor.    -- Indication: For Anticoagulation    nystatin 100,000 units/mL oral suspension  -- 5 milliliter(s) by mouth 4 times a day   -- Finish all this medication unless otherwise directed by prescriber.  Shake well before use.    -- Indication: For Antifungal    pravastatin 20 mg oral tablet  -- 1 tab(s) by mouth once a day (at bedtime)  -- Indication: For CHolesterol    valGANciclovir 450 mg oral tablet  -- 1 tab(s) by mouth once a day   -- Do not chew, break, or crush.  Do not take this drug if you are pregnant.  May cause drowsiness or dizziness.  Obtain medical advice before taking any non-prescription drugs as some may affect the action of this medication.  Take with food or milk.  Take with food.  This drug may impair the ability to drive or operate machinery.  Use care until you become familiar with its effects.    -- Indication: For Cmv prophylaxis    Kayexalate oral and rectal powder  -- 15 gram(s) by mouth once a day   -- Not to be used with sorbitol  Shake well before use.    -- Indication: For Hyperkalemia    silver sulfADIAZINE 1% topical cream  -- 1 application on skin 2 times a day   Apply to R hand   -- Indication: For topical agent    famotidine 20 mg oral tablet  -- 1 tab(s) by mouth once a day   -- Indication: For GI prophylaxis    mycophenolate mofetil 500 mg oral tablet  -- 2 tab(s) by mouth 2 times a day   -- Do not take this drug if you are pregnant.    -- Indication: For Immunosuppressed status    tacrolimus 1 mg oral capsule  -- 9 cap(s) by mouth once a day , in Morning  -- Indication: For Immunosuppression    tacrolimus 1 mg oral capsule  -- 10 cap(s) by mouth once a day (at bedtime)   -- Avoid grapefruit and grapefruit juice while taking this medication.  It is very important that you take or use this exactly as directed.  Do not skip doses or discontinue unless directed by your doctor.    -- Indication: For Immunosuppression    docusate sodium 100 mg oral capsule  -- 1 cap(s) by mouth 3 times a day  -- Indication: For Laxative    magnesium oxide 400 mg (241.3 mg elemental magnesium) oral tablet  -- 3 tab(s) by mouth once a day x 30 days    -- Indication: For supplement    atovaquone 750 mg/5 mL oral suspension  -- 10 milliliter(s) by mouth once a day  -- Indication: For Prophylaxis    Multiple Vitamins oral tablet  -- 1 tab(s) by mouth once a day  -- Indication: For supplement    Citracal + D 315 mg-250 intl units oral tablet  -- 2 tab(s) by mouth 2 times a day   -- Indication: For supplement

## 2018-03-09 NOTE — PROGRESS NOTE ADULT - ASSESSMENT
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 Underwent biopsy which showed CMR 1R, AMR 1 (no cellular injury). Repeat DSA negative. Now hyperkalemic and hypertensive    #Hyperkalemia  - Repeat yesterday 5.2; This am 5.4  - Changed bactrim to Mepron today  - on lasix  - low K diet  - repeat bmp this afternoon, consider kayexalate 15mg if still elevated.     #Hypertension  - now of Diltiazem 120mg CD; SBPs now under 120. Would continue current dose   - if SBP  persistently above 120s would increase diltiazem to 180mg    # Immunosuppression prophylaxis:  Tacrolimus - started on prograf 2/25; now on 8mg BID please check levels at 7:30 am (stat)  Continue CellCept 1000 mg PO BID  Steroids - continue taper; prednisone changed to 12.5mg BID yesterday.   	  # Antimicrobial prophylaxis:  Intermediate risk CMV - on oral valganciclovir 450 mg q12  Intermediate risk Toxo - on Mepron  PCP - on Mepron    # Graft function:  LV function improved; persistent mild RV dysfunction however excellent HD off inotropes  underwent biospy 3/1; positive for grade 1R/AMR 1; DSA negative  Biopsy #2 results pending  Last TTE with RV dysfunction but improved since previous    # Hypoxia:  saturating above 93% on room air;  c/w incentive spirometer    # Other prophylaxis:  Protonix 40 mg PO daily for GI prophylaxis while on steroids.    # Bilateral IJ/Subclavian thrombi  - HIT Ab positive; CHERIE negative; c/w lovenox  - appreciate vasc sx/card f/u  - plastics/hand and rheum to re-evaluate 3rd right digit for ischemia/?gout/infection  - ?role for debridement    # CAV PPx  - will start ASA when more stable  - statin prior to discharge    # ID - afebrile,  WBC declining slightly; serosanguinous drainage  - f/u cultures  - on cefepime for pseudomonas in sputum  - started on Vanc.   - Plastics and rheum consult for 3rd right digit possible infection?  - f/u ID recs.

## 2018-03-09 NOTE — PROGRESS NOTE ADULT - ASSESSMENT
68 yo man s/p prior LVAD placement, no infections related to the LVAD prior to undergoing an orthotopic heart transplant 2/23 from a reportedly high risk donor HCV. Patient on broad federica-operative prophylaxis to Vancomycin/Meropenem/Fluconazole based upon his prior LVAD placement and prolonged hospitalization pre-transplant. Intermediate risk for CMV (seropositive); intermediate risk for toxoplasmosis (seropositive R). Improved aeration today at right lung base. He had Significant amount of sero-sanguinous drainage from old LVAD exit site but cultures are NGTD    - Plan:  Continue OI prophylaxis with Valganciclovir, Bactrim, Nystatin    Receiving Cefepime 2gm q8hr for RLL atelectasis/?pneumonia with CR pseudomonas in sputum   Placed on IV Vancomycin 750mg q 12hr given increasing WBC and drainage from old LVAD site Vanco trough= 8.6 would increase dose to 1gram q 12hr.     Finger right middle- most consistent with gout crystals seen by rheum, but not clear why there is a blackening distally will monitor. Recent echo 3/5 without evidence of vegetations  Blood cultures to be sent today    High risk donor HCV+ to HCV- recipient. Needs repeat HCV viral load sent and will need treatment for HCV once stable/discharged            Gayr Lujan MD  [phone]  After 5pm/weekends [phone] 66 yo man s/p prior LVAD placement, no infections related to the LVAD prior to undergoing an orthotopic heart transplant 2/23 from a reportedly high risk donor HCV. Patient on broad federica-operative prophylaxis to Vancomycin/Meropenem/Fluconazole based upon his prior LVAD placement and prolonged hospitalization pre-transplant. Intermediate risk for CMV (seropositive); intermediate risk for toxoplasmosis (seropositive R). Improved aeration today at right lung base. He had Significant amount of sero-sanguinous drainage from old LVAD exit site but cultures are NGTD    - Plan:  Continue OI prophylaxis with Valganciclovir, Nystatin, Bactrim changed to Mepron by transplant team for PCP prophylaxis because of elevated potassium.    Receiving Cefepime 2gm q8hr for RLL atelectasis/?pneumonia with CR pseudomonas in sputum   Placed on IV Vancomycin 750mg q 12hr given increasing WBC and drainage from old LVAD site Vanco trough= 8.6 would increase dose to 1gram q 12hr.     Finger right middle- most consistent with gout crystals seen by rheum, but not clear why there is a blackening distally will monitor. Recent echo 3/5 without evidence of vegetations  Blood cultures to be sent today    High risk donor HCV+ to HCV- recipient. Needs repeat HCV viral load sent and will need treatment for HCV once stable/discharged            Gary Lujan MD  [phone]  After 5pm/weekends [phone]

## 2018-03-09 NOTE — DISCHARGE NOTE ADULT - ADDITIONAL INSTRUCTIONS
1) On 4/5: YOU HAVE AN APPOINTMENT WITH THE LIVER SPECIALIST , DR ADAM ON THURSDAY, 400 [facility] [phone]    2) 4/6 YOU WILL NEED TO HAVE BLOOD WORK DONE AT [address] [place] [address], EARLY AM.  DO NOT TAKE YOUR PROGRAF/TACROLIMUS THIS AM OF 4/6 1) On 4/5: YOU HAVE AN APPOINTMENT WITH THE LIVER SPECIALIST , DR ADAM ON THURSDAY, 400 [facility] [phone]    2) 4/6 YOU WILL NEED TO HAVE BLOOD WORK DONE  [facility], EARLY AM.  DO NOT TAKE YOUR PROGRAF/TACROLIMUS THIS AM OF 4/6.  TAKE IT IMMEDIATELY FOLLOWING YOUR BLOOD WORK    3) ON 4/11, WEDNESDAY ,  YOU ARE SCHEDULED FOR YOUR BIOPSY, 1) YOU HAVE AN APPOINTMENT WITH THE LIVER SPECIALIST , DR ADAM ON THURSDAY 4/26 @[address] @ 7:30 AM   [phone],  ENTRANCE #5    2) 4/13 YOU WILL NEED TO HAVE BLOOD WORK DONE AT [address] [place] [address], EARLY AM.  DO NOT TAKE YOUR PROGRAF/TACROLIMUS THIS AM OF 4/13.  TAKE IT IMMEDIATELY FOLLOWING YOUR BLOOD WORK    3) ON 4/,19,  YOU ARE SCHEDULED FOR YOUR BIOPSY.  YOU WILL SEE THE TRANSPLANT CARDIOLOGIST ON THE DAY OF THE BIOPSY    4) YOU HAVE AN APPOINTMENT ON 4/17, TUESDAY , WITH THE PLASTIC SURGEON DR MORRELL AT 10:45, [facility], TO EXAMINE YOUR FINGER    5)  YOU HAVE AN APPOINTMENT WITH THE VASCULAR CARDIOLOGIST DR BAEZ ON 4/24 AT 9AM, [facility] , ENTRANCE 1 1) YOU HAVE AN APPOINTMENT WITH THE LIVER SPECIALIST , DR ADAM ON THURSDAY 4/26 @[address] @ 7:30 AM   [phone],  ENTRANCE #5    2) YOU WILL NEED TO HAVE BLOOD WORK DONE AT [address] [place] [address], EARLY AM 4/12 THURSDAY  DO NOT TAKE YOUR PROGRAF/TACROLIMUS THIS AM OF 4/12.  TAKE IT IMMEDIATELY FOLLOWING YOUR BLOOD WORK    3) ON 4/,19,  YOU ARE SCHEDULED FOR YOUR BIOPSY.  YOU WILL SEE THE TRANSPLANT CARDIOLOGIST ON THE DAY OF THE BIOPSY    4) YOU HAVE AN APPOINTMENT ON 4/17, TUESDAY , WITH THE PLASTIC SURGEON DR MORRELL AT 10:45, [facility], TO EXAMINE YOUR FINGER    5)  YOU HAVE AN APPOINTMENT WITH THE VASCULAR CARDIOLOGIST DR BAEZ ON 4/24 AT 9AM, 300 [facility] , ENTRANCE 1

## 2018-03-09 NOTE — DISCHARGE NOTE ADULT - CARE PROVIDER_API CALL
Edil Stahl (MD; MPH), Adv Heart Fail Trnsplnt Cardio; Cardiology  [address] [place] [address]  Phone: [phone]  Fax: [phone]    Ti Parker), Thoracic and Cardiac Surgery  [address] [place] [address]  Phone: [phone]  Fax: [phone]    Sujey Lujan), Infectious Disease; Internal Medicine  [address] [place] [address]  Phone: [phone]  Fax: [phone]    Yaya Cross), Plastic Surgery; Surgery; Surgical Critical Care  [address] [place] [address]  Phone: [phone]  Fax: [phone]

## 2018-03-09 NOTE — PROGRESS NOTE ADULT - SUBJECTIVE AND OBJECTIVE BOX
Interval History:  No acute events overnight noted.     Medications:  acetaminophen   Tablet. 650 milliGRAM(s) Oral every 6 hours PRN  ALBUTerol/ipratropium for Nebulization 3 milliLiter(s) Nebulizer every 6 hours  atovaquone Suspension 1500 milliGRAM(s) Oral daily  cefepime  IVPB      cefepime  IVPB 2000 milliGRAM(s) IV Intermittent every 8 hours  diltiazem    milliGRAM(s) Oral daily  docusate sodium 100 milliGRAM(s) Oral three times a day  enoxaparin Injectable 80 milliGRAM(s) SubCutaneous every 12 hours  furosemide    Tablet 40 milliGRAM(s) Oral daily  insulin lispro (HumaLOG) corrective regimen sliding scale   SubCutaneous three times a day before meals  insulin lispro (HumaLOG) corrective regimen sliding scale   SubCutaneous at bedtime  mycophenolate mofetil 1000 milliGRAM(s) Oral <User Schedule>  nystatin    Suspension 645335 Unit(s) Oral every 6 hours  pantoprazole  Injectable 40 milliGRAM(s) IV Push two times a day  predniSONE   Tablet 12.5 milliGRAM(s) Oral <User Schedule>  tacrolimus 8 milliGRAM(s) Oral <User Schedule>  valGANciclovir 450 milliGRAM(s) Oral every 12 hours  vancomycin  IVPB 750 milliGRAM(s) IV Intermittent every 12 hours    Vitals:  T(C): 36.2 (18 @ 03:25), Max: 36.6 (18 @ 19:33)  HR: 86 (18 @ 05:46) (86 - 95)  BP: 118/64 (18 @ 05:46) (99/60 - 144/78)  BP(mean): 85 (18 @ 05:46) (81 - 103)  RR: 18 (18 @ 03:25) (18 - 18)  SpO2: 99% (18 @ 03:25) (95% - 99%)    Daily     Daily Weight in k.5 (08 Mar 2018 10:17)      I&O's Summary    08 Mar 2018 07:01  -  09 Mar 2018 07:00  --------------------------------------------------------  IN: 520 mL / OUT: 1700 mL / NET: -1180 mL    09 Mar 2018 07:01  -  09 Mar 2018 09:39  --------------------------------------------------------  IN: 0 mL / OUT: 350 mL / NET: -350 mL        Physical Exam:  Appearance: No Acute Distress  HEENT: No JVD  Cardiovascular: Normal S1 S2, No murmurs/rubs/gallops  Respiratory: Clear to auscultation bilaterally  Gastrointestinal: Soft, Non-tender	  Skin: No cyanosis	  Neurologic: Non-focal  Extremities: No LE edema  Psychiatry: A & O x 3, Mood & affect appropriate      Labs:                        9.6    23.5  )-----------( 449      ( 09 Mar 2018 07:17 )             30.2         137  |  106  |  22  ----------------------------<  108<H>  5.4<H>   |  20<L>  |  1.19    Ca    8.5      09 Mar 2018 07:17    TPro  6.4  /  Alb  2.9<L>  /  TBili  0.4  /  DBili  x   /  AST  25  /  ALT  28  /  AlkPhos  77            TELEMETRY:  Sinus 90s-100s    [ ] Echocardiogram:

## 2018-03-09 NOTE — PROGRESS NOTE ADULT - SUBJECTIVE AND OBJECTIVE BOX
Hello im ok    VITAL SIGNS    Telemetry:  nsr 80    Vital Signs Last 24 Hrs  T(C): 36.3 (18 @ 16:15), Max: 36.6 (18 @ 19:33)  T(F): 97.4 (18 @ 16:15), Max: 97.9 (18 @ 19:33)  HR: 94 (18 @ 16:15) (86 - 95)  BP: 119/57 (18 @ 16:15) (99/60 - 119/69)  RR: 18 (18 @ 16:15) (18 - 18)  SpO2: 97% (18 @ 16:15) (95% - 99%)                    @ 07:01  -   @ 07:00  --------------------------------------------------------  IN: 520 mL / OUT: 1700 mL / NET: -1180 mL     @ 07:01  -   @ 17:08  --------------------------------------------------------  IN: 440 mL / OUT: 1325 mL / NET: -885 mL          Daily     Daily Weight in k.5 (09 Mar 2018 13:57)        CAPILLARY BLOOD GLUCOSE      POCT Blood Glucose.: 110 mg/dL (09 Mar 2018 16:45)  POCT Blood Glucose.: 119 mg/dL (09 Mar 2018 11:50)  POCT Blood Glucose.: 111 mg/dL (09 Mar 2018 07:57)  POCT Blood Glucose.: 112 mg/dL (08 Mar 2018 21:35)                  Coumadin    [ ] YES          [  ]      NO         Reason:                               PHYSICAL EXAM        Neurology: alert and oriented x 3, nonfocal, no gross deficits  CV : S1 S2 , RRR   Sternal Wound :  CDI , Stable  Lungs: cta  Abdomen: soft, nontender, nondistended, positive bowel sounds, last bowel movement  +  :         Voiding    Extremities:    trace shagufta a- calve tenderness    R 3rd digit w area of ischemia, had drainage           acetaminophen   Tablet. 650 milliGRAM(s) Oral every 6 hours PRN  ALBUTerol/ipratropium for Nebulization 3 milliLiter(s) Nebulizer every 6 hours  atovaquone Suspension 1500 milliGRAM(s) Oral daily  cefepime  IVPB      cefepime  IVPB 2000 milliGRAM(s) IV Intermittent every 8 hours  diltiazem    milliGRAM(s) Oral daily  docusate sodium 100 milliGRAM(s) Oral three times a day  enoxaparin Injectable 80 milliGRAM(s) SubCutaneous every 12 hours  insulin lispro (HumaLOG) corrective regimen sliding scale   SubCutaneous three times a day before meals  insulin lispro (HumaLOG) corrective regimen sliding scale   SubCutaneous at bedtime  mycophenolate mofetil 1000 milliGRAM(s) Oral <User Schedule>  nystatin    Suspension 609202 Unit(s) Oral every 6 hours  pantoprazole  Injectable 40 milliGRAM(s) IV Push two times a day  predniSONE   Tablet 12.5 milliGRAM(s) Oral <User Schedule>  tacrolimus 8 milliGRAM(s) Oral <User Schedule>  valGANciclovir 450 milliGRAM(s) Oral every 12 hours  vancomycin  IVPB 750 milliGRAM(s) IV Intermittent every 12 hours                    Physical Therapy Rec:   Home  [  ]   Home w/ PT  [  ]  Rehab  [  ]  Discussed with Cardiothoracic Team at AM rounds.

## 2018-03-09 NOTE — PROGRESS NOTE ADULT - ASSESSMENT
67 yr old male s/p  Heart transplant 2/23/18 for HF   H; GI bleed, LVAD implant, afib, anemia AICD  Intra op acute rejection of graft and Intraop IABP placed  and removed POD 1 and received plasmaphoresis x 2  2/26 UE +RT IJ inominate SC,cephalic DVT and +lt SC DVT  3/1  cardiac bx low grade rejection  3/2 bronchoscopy   neg   3/4  trf too floor,  Imipenum for low grade fevers  3/5 Sputum + PSA, ID to see pt  Vac changed to RLQ  3/6 Pt on cefepime  Remains on lovenox, planning on biopsy thursday, will need to hold   3/8 Cardiac biopsy this am - prograf level - 7- prograf level increased to 8mg po bid  toprol d/c'd,  Cardizem CD 120mg po initiated  hi filling pressures noted on right heart cath-lasix 40mg po daily started as per HF  K+= 6 - Bactrim d/c'd - Mepron to start in am for prophylaxis  rpt k+ = 5.2-  daily prograf levels, daily CXR, bid CMP & CBC  plastics consult for ?necrotic right 3rd digit  ** if SBP remains >120-130 will increase CardizemCD to 180mg po daily tomorrow 3/9  resume lvx 80mg sq bid tonight  Cardiac biopsy results pending- depending on biopsy results - HF to adjust prednisone taper  d/c planning  3/9 prednisone 12.5 bid.  Lasix x 1 in am tomorrow, then d/c.   Prograf 10.4, level increased from 7.9 so will need a prograf level tonight at 7 pm, Dr. Mccabe to be notified.  K elevated , if continues to rise may need  kaexalate  Finger with  possible necrosis 3rd R.  Tophus noted by rheumatology,+ crystals  specimen sent.  Hand /plastics consulted, finger  drained, may need further intervention

## 2018-03-09 NOTE — PROGRESS NOTE ADULT - PROBLEM SELECTOR PLAN 1
K+=5.4 this am  Bactrim d/c'd  yest as it causes hyperkalemi  repeat K+ @ 12pm = 5.2  will change prophylactic antibiotic to Mepron 1500mg po daily w/ food

## 2018-03-09 NOTE — CONSULT NOTE ADULT - ASSESSMENT
66yo M w/ multiple co-morbidities including HFrEF (20%) and NICM s/p LVAD 6/2017, Afib, Gout who was found to have pump thrombus now s/p orthotopic heart transplant 2/23/18 c/b primary graft dysfunction s/p plasmapharesis/IVIG and bilateral IJ and subclavian venous thrombus now on lovenox. Rheumatology consulted for R 3rd digit discoloration and pain.     1. 3rd digit pain 66yo M w/ multiple co-morbidities including HFrEF (20%) and NICM s/p LVAD 6/2017, Afib, Gout who was found to have pump thrombus now s/p orthotopic heart transplant 2/23/18 c/b primary graft dysfunction s/p plasmapharesis/IVIG and bilateral IJ and subclavian venous thrombus now on lovenox. Rheumatology consulted for R 3rd digit pain, now resolved s/p drainage by plastics this AM.    1. Right 3rd DIP/digit pain  - Exudate from DIP collected and examined under polarized light microscopy revealing needle-shaped negatively birefringent crystals. 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 There are no signs or symptoms of acute gout flare at this time. If patient develops gout flare (i.e., increased pain, swelling, erythema, stiffness) would recommend a short course of increased steroid. 66yo M w/ multiple co-morbidities including HFrEF (20%) and NICM s/p LVAD 6/2017, Afib, Gout who was found to have pump thrombus now s/p orthotopic heart transplant 2/23/18 c/b primary graft dysfunction s/p plasmapharesis/IVIG and bilateral IJ and subclavian venous thrombus now on lovenox. Rheumatology consulted for R 3rd digit pain, now resolved s/p drainage by plastics this AM.    1. 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   - there are no signs or symptoms of acute gout flare at this time, if patient develops gout flare (i.e., increased pain, swelling, erythema, stiffness) would recommend a short course of increased steroid.

## 2018-03-09 NOTE — PROGRESS NOTE ADULT - SUBJECTIVE AND OBJECTIVE BOX
PAGER:  [phone]               [facility] [id number]              EMAIL [email or web address]   OFFICE [phone]                              ********VASCULAR MEDICINE PROGRESS NOTE********                        CC:  UE DVT    INTERVAL HISTORY:    No issues.  Arms feel fine. RUE ACE wrap in place.  R 3rd digit seen by plastics s/p drainage and local care.  No CP.       HISTORY OF PRESENT ILLNESS:  HPI:  67M PMH Chronic Systolic Heart Failure (ACC/AHA Stage D) due to NICMP s/p LVAD 6/12/17, GIB s/p Cautery (7/25/2017), known AV Malformations, Chronic Anemia due to numerous GIBs (off AC), A-Fib, VT s/p AICD. Pt admitted due to elevated LDH level of 646 for further evaluation and observation.  . Pt denies any chest pain, SOB, palpitations, N/D/V, abdominal pain, headaches, changes in vision, dizziness, pain or burning while urinating, swelling, numbness/tingling anywhere, rashes, fever, or recent travel. (01 Feb 2018 19:36)    MEDICATIONS  (STANDING):  ALBUTerol/ipratropium for Nebulization 3 milliLiter(s) Nebulizer every 6 hours  atovaquone Suspension 1500 milliGRAM(s) Oral daily  cefepime  IVPB      cefepime  IVPB 2000 milliGRAM(s) IV Intermittent every 8 hours  diltiazem    milliGRAM(s) Oral daily  docusate sodium 100 milliGRAM(s) Oral three times a day  enoxaparin Injectable 80 milliGRAM(s) SubCutaneous every 12 hours  furosemide    Tablet 40 milliGRAM(s) Oral daily  insulin lispro (HumaLOG) corrective regimen sliding scale   SubCutaneous three times a day before meals  insulin lispro (HumaLOG) corrective regimen sliding scale   SubCutaneous at bedtime  mycophenolate mofetil 1000 milliGRAM(s) Oral <User Schedule>  nystatin    Suspension 059090 Unit(s) Oral every 6 hours  pantoprazole  Injectable 40 milliGRAM(s) IV Push two times a day  predniSONE   Tablet 12.5 milliGRAM(s) Oral <User Schedule>  tacrolimus 8 milliGRAM(s) Oral <User Schedule>  valGANciclovir 450 milliGRAM(s) Oral every 12 hours  vancomycin  IVPB 750 milliGRAM(s) IV Intermittent every 12 hours    MEDICATIONS  (PRN):  acetaminophen   Tablet. 650 milliGRAM(s) Oral every 6 hours PRN Mild Pain (1 - 3)   every 12 hours    MEDICATIONS  (PRN):  acetaminophen   Tablet. 650 milliGRAM(s) Oral every 6 hours PRN Mild Pain (1 - 3)  PAST MEDICAL & SURGICAL HISTORY:  GIB (gastrointestinal bleeding)  Ventricular fibrillation: s/p AICD  PAF (paroxysmal atrial fibrillation): on xarelto  Non-Ischemic Cardiomyopathy  SVT (Supraventricular Tachycardia)  HTN  CHF (Congestive Heart Failure)  LVAD (left ventricular assist device) present  Status post left hip replacement  History of Prior Ablation Treatment: for afib  AICD (Automatic Cardioverter/Defibrillator) Present: [employer] with 1 [employer] lead4/1/09- explanted and replaced with [employer] 2 leads on 9/2/09      FAMILY HISTORY:  No pertinent family history in first degree relatives      SOCIAL HISTORY:  unchanged    REVIEW OF SYSTEMS:  CONSTITUTIONAL: No fever, weight loss, or fatigue  EYES: No eye pain, visual disturbances, or discharge  ENMT:  No difficulty hearing, tinnitus, vertigo; No sinus or throat pain  NECK: No pain or stiffness  RESPIRATORY: No cough, wheezing, chills or hemoptysis; No Shortness of Breath  CARDIOVASCULAR: No chest pain, palpitations, passing out, dizziness, or leg swelling  GASTROINTESTINAL: No abdominal or epigastric pain. No nausea, vomiting, or hematemesis; No diarrhea or constipation. No melena or hematochezia.  GENITOURINARY: No dysuria, frequency, hematuria, or incontinence  NEUROLOGICAL: No headaches, memory loss, loss of strength, numbness, or tremors  SKIN: No itching, burning, rashes, or lesions   LYMPH Nodes: No enlarged glands  ENDOCRINE: No heat or cold intolerance; No hair loss  MUSCULOSKELETAL: No joint pain or swelling; No muscle, back, or extremity pain  PSYCHIATRIC: No depression, anxiety, mood swings, or difficulty sleeping  HEME/LYMPH: No easy bruising, or bleeding gums  ALLERY AND IMMUNOLOGIC: No hives or eczema	    [x ] All others negative	  [ ] Unable to obtain    ICU Vital Signs Last 24 Hrs  T(C): 36.2 (09 Mar 2018 03:25), Max: 36.6 (08 Mar 2018 19:33)  T(F): 97.2 (09 Mar 2018 03:25), Max: 97.9 (08 Mar 2018 19:33)  HR: 86 (09 Mar 2018 05:46) (86 - 95)  BP: 118/64 (09 Mar 2018 05:46) (99/60 - 144/78)  BP(mean): 85 (09 Mar 2018 05:46) (81 - 103)  ABP: --  ABP(mean): --  RR: 18 (09 Mar 2018 03:25) (18 - 18)  SpO2: 99% (09 Mar 2018 03:25) (95% - 99%)      Appearance: Normal, on high flow oxgyen  HEENT:   Normal oral mucosa, PERRL, EOMI	  Lymphatic: No lymphadenopathy  Cardiovascular: Normal S1 S2 tachycardia.   Respiratory: clear	  Psychiatry: A & O x 3, Mood & affect appropriate  Gastrointestinal:  Soft, Non-tender, + BS	  Skin: No rashes, No ecchymoses, No cyanosis	  Neurologic: Non-focal  Extremities: RUE edema.  3rd digit R hand ischemia, motion and sensation intact.                                   9.6    23.5  )-----------( 449      ( 09 Mar 2018 07:17 )             30.2   03-09    137  |  106  |  22  ----------------------------<  108<H>  5.4<H>   |  20<L>  |  1.19    Ca    8.5      09 Mar 2018 07:17    TPro  6.4  /  Alb  2.9<L>  /  TBili  0.4  /  DBili  x   /  AST  25  /  ALT  28  /  AlkPhos  77  03-09

## 2018-03-09 NOTE — PROGRESS NOTE ADULT - SUBJECTIVE AND OBJECTIVE BOX
INFECTIOUS DISEASES FOLLOW UP-- Sujey Lujan  [phone]    This is a follow up note for this  67yMale with s/p removal of LVAD and OHTx from a high risk donor on 2/23. Interval events patient with pain in the distal third finger on the right hand- with evaluation by rheum. consistent with gout tophi/crystals seen by microscopy      ROS:  CONSTITUTIONAL:  No fever, good appetite  CARDIOVASCULAR:  No chest pain or palpitations  RESPIRATORY:  No dyspnea  GASTROINTESTINAL:  No nausea, vomiting, diarrhea, or abdominal pain  GENITOURINARY:  No dysuria  NEUROLOGIC:  No headache,     Allergies    No Known Allergies    Intolerances        ANTIBIOTICS/RELEVANT:  antimicrobials  atovaquone Suspension 1500 milliGRAM(s) Oral daily  cefepime  IVPB      cefepime  IVPB 2000 milliGRAM(s) IV Intermittent every 8 hours  nystatin    Suspension 480338 Unit(s) Oral every 6 hours  valGANciclovir 450 milliGRAM(s) Oral every 12 hours  vancomycin  IVPB 750 milliGRAM(s) IV Intermittent every 12 hours    immunologic:  mycophenolate mofetil 1000 milliGRAM(s) Oral <User Schedule>  tacrolimus 8 milliGRAM(s) Oral <User Schedule>    OTHER:  acetaminophen   Tablet. 650 milliGRAM(s) Oral every 6 hours PRN  ALBUTerol/ipratropium for Nebulization 3 milliLiter(s) Nebulizer every 6 hours  diltiazem    milliGRAM(s) Oral daily  docusate sodium 100 milliGRAM(s) Oral three times a day  enoxaparin Injectable 80 milliGRAM(s) SubCutaneous every 12 hours  insulin lispro (HumaLOG) corrective regimen sliding scale   SubCutaneous three times a day before meals  insulin lispro (HumaLOG) corrective regimen sliding scale   SubCutaneous at bedtime  pantoprazole  Injectable 40 milliGRAM(s) IV Push two times a day  predniSONE   Tablet 12.5 milliGRAM(s) Oral <User Schedule>      Objective:  Vital Signs Last 24 Hrs  T(C): 36.3 (09 Mar 2018 16:15), Max: 36.6 (08 Mar 2018 19:33)  T(F): 97.4 (09 Mar 2018 16:15), Max: 97.9 (08 Mar 2018 19:33)  HR: 94 (09 Mar 2018 16:15) (86 - 95)  BP: 119/57 (09 Mar 2018 16:15) (99/60 - 119/69)  BP(mean): 76 (09 Mar 2018 16:15) (76 - 85)  RR: 18 (09 Mar 2018 16:15) (18 - 18)  SpO2: 97% (09 Mar 2018 16:15) (95% - 99%)    PHYSICAL EXAM:  Constitutional:no acute distress  Eyes:WALT, EOMI  Ear/Nose/Throat: no oral lesions, 	  Respiratory: clear BL anteriorly but wiht decreased BS at right base  sternotomy wound tissue apposed  Cardiovascular: S1S2  old LVAD site with wound vac overlying it  Gastrointestinal:soft, (+) BS, no tenderness  Extremities:no e/e/c  right middle finger distal area with blackening of nail, tophi  No Lymphadenopathy  IV sites not inflammed.    LABS:                        9.6    23.5  )-----------( 449      ( 09 Mar 2018 07:17 )             30.2     03-09    137  |  106  |  22  ----------------------------<  108<H>  5.4<H>   |  20<L>  |  1.19    Ca    8.5      09 Mar 2018 07:17    TPro  6.4  /  Alb  2.9<L>  /  TBili  0.4  /  DBili  x   /  AST  25  /  ALT  28  /  AlkPhos  77  03-09          MICROBIOLOGY:            RECENT CULTURES:  03-05 @ 17:59  Skin LVAD driveline site  --  --  --    No growth at 48 hours  --      RADIOLOGY & ADDITIONAL STUDIES:    < from: Xray Chest 1 View- PORTABLE-Routine (03.09.18 @ 05:26) >    IMPRESSION:    1.  Stable small right pleural effusion with adjacent atelectasis.    < end of copied text >

## 2018-03-09 NOTE — PROGRESS NOTE ADULT - PROBLEM SELECTOR PLAN 3
3rd digit right hand w/ ?embolic event-discolorization  plastics consult called - Dr. Yaya Cross  will continue to monitor

## 2018-03-09 NOTE — PROGRESS NOTE ADULT - SUBJECTIVE AND OBJECTIVE BOX
Asked to evaluate patients left Middle finger tip for concern of ischemic changes.  History and hospital course reviewed  On exam the patient complains of pain in his middle finger consistent with pain he has experiences before in relation to gout  There are changes in the finger distally consistent with an ischemic event  AROM limited by stiffness and pain  No tenderness along flexor tendon sheath to suggest tenosynovitis  Small ulcers dorsal and volar with whitish fluid underneath (opening enlarged to allow for improved drainage)  Particulate matter expressed cw crystalline deposits  XRAYS with diffuse changes in hand and wrist cw arthritis   DIP joint and Distal phalanx on MF with erosive changes     IMP: Crystaline arthropathy along with possible ischemic event  Recc:  - Silvadene Cream Twice daily and dress with dry gauze  - Will make further multidisciplinary plan with primary service as condition evolves  - Will continue to follow patient   [phone]

## 2018-03-09 NOTE — DISCHARGE NOTE ADULT - CARE PLAN
Principal Discharge DX:	H/O heart transplant  Goal:	recovery  Assessment and plan of treatment:	Regular no added salt diet  Shower daily  Daily weights, call for a gain > 3 lbs

## 2018-03-09 NOTE — DISCHARGE NOTE ADULT - CARE PROVIDERS DIRECT ADDRESSES
[email or web address],[email or web address]

## 2018-03-09 NOTE — CONSULT NOTE ADULT - ATTENDING COMMENTS
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 He is blood type B, 79 kg, 5'7", PRA 0  - heparin gtt for goal PTT 70-90; continue coumadin for goal INR 2-2.5  - follow serial LDH  - continue lasix 20 mg every other day  - continue renee 25 mg daily and coreg 12.5 mg bid  - slow VT; continue mexilitine 150 mg q12 and amio 200 mg daily   - transplant eval underway; needs repeat PFTs
PRBC transfusion.  Hold all AC.  GI f/u.  Will d/w [facility] transplant program.
Anemia, rule out recurrent GI bleed  Bedside enteroscopy today
Patient with acute, occlusive DVT of the right internal jugular, innominate, and subclavian veins and acute, occlusive DVT of the left subclavian vein along with superficial thrombosis of the right cephalic vein.  Given the proximal nature of the thrombosis, would favor anticoagulation.  Given proximity to transplant day, would prevent an IV agent with a short half life such as heparin or argatraban (if concern for HIT)  Would start with a narrow therapeutic PTT without a bolus to avoid risk of post operative bleeding    Arm elevation, head elevation.    Will follow closely with you.    Thanks    Saurabh Tavarez  Vascular Medicine  [id number]
pt seen and examined by me personally  hospital course/labs/imaging reviewed  agree with assessment and plan as above     had acute distal digit pain and subsequent drainage by plastics alleviated the sx  he has known chronic OA and gout, erosions on XRAY   also with signs of tophi on exam and sheets of crystals seen under microscope today     there is no indication for tx of acute inflammatory gout attack at the moment   recall as needed for any acute flares   continue tx plan as per transplant medicine
The patient has NORMAN with bland urine sediment and hyaline cases suggestive of a state of decreased effective circulating volume for the kidneys.  The patient also had urinary retention.  No suspicion for pigment nephropathy from pump thrombosis at this time as there is no heme in the urine and bili is not elevated.  Hyperkalemia is concerning but now that obstruction is relieved this should improve.  I recommend monitoring the serum potassium regularly and using intracellular shifting agents.

## 2018-03-09 NOTE — PROGRESS NOTE ADULT - ASSESSMENT
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     Upper extremity DVT  - Continue Lovenox 1mg/kg BID   - elevate arms above heart level and continue with light compression to R arm with 4 inch ace wrap from hand to axilla.    - appreciate plastics recs regarding R 3rd digit.      Will follow with you    Thanks    Saurabh Tavarez  [id number]

## 2018-03-10 LAB
ALBUMIN SERPL ELPH-MCNC: 3 G/DL — LOW (ref 3.3–5)
ALP SERPL-CCNC: 80 U/L — SIGNIFICANT CHANGE UP (ref 40–120)
ALT FLD-CCNC: 25 U/L RC — SIGNIFICANT CHANGE UP (ref 10–45)
ANION GAP SERPL CALC-SCNC: 11 MMOL/L — SIGNIFICANT CHANGE UP (ref 5–17)
ANION GAP SERPL CALC-SCNC: 15 MMOL/L — SIGNIFICANT CHANGE UP (ref 5–17)
APPEARANCE UR: CLEAR — SIGNIFICANT CHANGE UP
AST SERPL-CCNC: 24 U/L — SIGNIFICANT CHANGE UP (ref 10–40)
BILIRUB SERPL-MCNC: 0.4 MG/DL — SIGNIFICANT CHANGE UP (ref 0.2–1.2)
BILIRUB UR-MCNC: NEGATIVE — SIGNIFICANT CHANGE UP
BUN SERPL-MCNC: 26 MG/DL — HIGH (ref 7–23)
BUN SERPL-MCNC: 27 MG/DL — HIGH (ref 7–23)
CALCIUM SERPL-MCNC: 8.2 MG/DL — LOW (ref 8.4–10.5)
CALCIUM SERPL-MCNC: 8.2 MG/DL — LOW (ref 8.4–10.5)
CHLORIDE SERPL-SCNC: 105 MMOL/L — SIGNIFICANT CHANGE UP (ref 96–108)
CHLORIDE SERPL-SCNC: 106 MMOL/L — SIGNIFICANT CHANGE UP (ref 96–108)
CO2 SERPL-SCNC: 18 MMOL/L — LOW (ref 22–31)
CO2 SERPL-SCNC: 22 MMOL/L — SIGNIFICANT CHANGE UP (ref 22–31)
COLOR SPEC: YELLOW — SIGNIFICANT CHANGE UP
CREAT SERPL-MCNC: 1.13 MG/DL — SIGNIFICANT CHANGE UP (ref 0.5–1.3)
CREAT SERPL-MCNC: 1.14 MG/DL — SIGNIFICANT CHANGE UP (ref 0.5–1.3)
CRP SERPL-MCNC: 2.5 MG/DL — HIGH (ref 0–0.4)
DIFF PNL FLD: ABNORMAL
ERYTHROCYTE [SEDIMENTATION RATE] IN BLOOD: 30 MM/HR — HIGH (ref 0–20)
GLUCOSE BLDC GLUCOMTR-MCNC: 103 MG/DL — HIGH (ref 70–99)
GLUCOSE BLDC GLUCOMTR-MCNC: 125 MG/DL — HIGH (ref 70–99)
GLUCOSE BLDC GLUCOMTR-MCNC: 141 MG/DL — HIGH (ref 70–99)
GLUCOSE BLDC GLUCOMTR-MCNC: 88 MG/DL — SIGNIFICANT CHANGE UP (ref 70–99)
GLUCOSE SERPL-MCNC: 128 MG/DL — HIGH (ref 70–99)
GLUCOSE SERPL-MCNC: 134 MG/DL — HIGH (ref 70–99)
GLUCOSE UR QL: NEGATIVE — SIGNIFICANT CHANGE UP
HCT VFR BLD CALC: 25.9 % — LOW (ref 39–50)
HCT VFR BLD CALC: 26.3 % — LOW (ref 39–50)
HGB BLD-MCNC: 8.5 G/DL — LOW (ref 13–17)
HGB BLD-MCNC: 8.6 G/DL — LOW (ref 13–17)
KETONES UR-MCNC: NEGATIVE — SIGNIFICANT CHANGE UP
LEUKOCYTE ESTERASE UR-ACNC: NEGATIVE — SIGNIFICANT CHANGE UP
MCHC RBC-ENTMCNC: 30.8 PG — SIGNIFICANT CHANGE UP (ref 27–34)
MCHC RBC-ENTMCNC: 30.8 PG — SIGNIFICANT CHANGE UP (ref 27–34)
MCHC RBC-ENTMCNC: 32.6 GM/DL — SIGNIFICANT CHANGE UP (ref 32–36)
MCHC RBC-ENTMCNC: 32.6 GM/DL — SIGNIFICANT CHANGE UP (ref 32–36)
MCV RBC AUTO: 94.4 FL — SIGNIFICANT CHANGE UP (ref 80–100)
MCV RBC AUTO: 94.7 FL — SIGNIFICANT CHANGE UP (ref 80–100)
NITRITE UR-MCNC: NEGATIVE — SIGNIFICANT CHANGE UP
PH UR: 6 — SIGNIFICANT CHANGE UP (ref 5–8)
PLATELET # BLD AUTO: 412 K/UL — HIGH (ref 150–400)
PLATELET # BLD AUTO: 430 K/UL — HIGH (ref 150–400)
POTASSIUM SERPL-MCNC: 4.7 MMOL/L — SIGNIFICANT CHANGE UP (ref 3.5–5.3)
POTASSIUM SERPL-MCNC: 5 MMOL/L — SIGNIFICANT CHANGE UP (ref 3.5–5.3)
POTASSIUM SERPL-SCNC: 4.7 MMOL/L — SIGNIFICANT CHANGE UP (ref 3.5–5.3)
POTASSIUM SERPL-SCNC: 5 MMOL/L — SIGNIFICANT CHANGE UP (ref 3.5–5.3)
PROT SERPL-MCNC: 6.2 G/DL — SIGNIFICANT CHANGE UP (ref 6–8.3)
PROT UR-MCNC: 30 MG/DL
RBC # BLD: 2.75 M/UL — LOW (ref 4.2–5.8)
RBC # BLD: 2.78 M/UL — LOW (ref 4.2–5.8)
RBC # FLD: 16.2 % — HIGH (ref 10.3–14.5)
RBC # FLD: 16.3 % — HIGH (ref 10.3–14.5)
RBC CASTS # UR COMP ASSIST: SIGNIFICANT CHANGE UP /HPF (ref 0–2)
SODIUM SERPL-SCNC: 138 MMOL/L — SIGNIFICANT CHANGE UP (ref 135–145)
SODIUM SERPL-SCNC: 139 MMOL/L — SIGNIFICANT CHANGE UP (ref 135–145)
SP GR SPEC: 1.01 — SIGNIFICANT CHANGE UP (ref 1.01–1.02)
TACROLIMUS SERPL-MCNC: 11 NG/ML — SIGNIFICANT CHANGE UP
URATE SERPL-MCNC: 4.8 MG/DL — SIGNIFICANT CHANGE UP (ref 3.4–8.8)
UROBILINOGEN FLD QL: NEGATIVE — SIGNIFICANT CHANGE UP
WBC # BLD: 29.8 K/UL — HIGH (ref 3.8–10.5)
WBC # BLD: 29.9 K/UL — HIGH (ref 3.8–10.5)
WBC # FLD AUTO: 29.8 K/UL — HIGH (ref 3.8–10.5)
WBC # FLD AUTO: 29.9 K/UL — HIGH (ref 3.8–10.5)

## 2018-03-10 PROCEDURE — 99233 SBSQ HOSP IP/OBS HIGH 50: CPT

## 2018-03-10 PROCEDURE — 99232 SBSQ HOSP IP/OBS MODERATE 35: CPT

## 2018-03-10 PROCEDURE — 71250 CT THORAX DX C-: CPT | Mod: 26

## 2018-03-10 PROCEDURE — 71045 X-RAY EXAM CHEST 1 VIEW: CPT | Mod: 26

## 2018-03-10 PROCEDURE — 74176 CT ABD & PELVIS W/O CONTRAST: CPT | Mod: 26

## 2018-03-10 RX ORDER — VANCOMYCIN HCL 1 G
1000 VIAL (EA) INTRAVENOUS EVERY 12 HOURS
Qty: 0 | Refills: 0 | Status: DISCONTINUED | OUTPATIENT
Start: 2018-03-10 | End: 2018-03-16

## 2018-03-10 RX ADMIN — VALGANCICLOVIR 450 MILLIGRAM(S): 450 TABLET, FILM COATED ORAL at 08:00

## 2018-03-10 RX ADMIN — ENOXAPARIN SODIUM 80 MILLIGRAM(S): 100 INJECTION SUBCUTANEOUS at 05:22

## 2018-03-10 RX ADMIN — TACROLIMUS 8 MILLIGRAM(S): 5 CAPSULE ORAL at 20:01

## 2018-03-10 RX ADMIN — Medication 40 MILLIGRAM(S): at 05:25

## 2018-03-10 RX ADMIN — PANTOPRAZOLE SODIUM 40 MILLIGRAM(S): 20 TABLET, DELAYED RELEASE ORAL at 17:49

## 2018-03-10 RX ADMIN — Medication 3 MILLILITER(S): at 23:29

## 2018-03-10 RX ADMIN — CEFEPIME 100 MILLIGRAM(S): 1 INJECTION, POWDER, FOR SOLUTION INTRAMUSCULAR; INTRAVENOUS at 14:14

## 2018-03-10 RX ADMIN — VALGANCICLOVIR 450 MILLIGRAM(S): 450 TABLET, FILM COATED ORAL at 20:01

## 2018-03-10 RX ADMIN — CEFEPIME 100 MILLIGRAM(S): 1 INJECTION, POWDER, FOR SOLUTION INTRAMUSCULAR; INTRAVENOUS at 05:22

## 2018-03-10 RX ADMIN — Medication 12.5 MILLIGRAM(S): at 20:03

## 2018-03-10 RX ADMIN — TACROLIMUS 8 MILLIGRAM(S): 5 CAPSULE ORAL at 08:00

## 2018-03-10 RX ADMIN — Medication 500000 UNIT(S): at 17:49

## 2018-03-10 RX ADMIN — Medication 500000 UNIT(S): at 12:42

## 2018-03-10 RX ADMIN — Medication 12.5 MILLIGRAM(S): at 08:01

## 2018-03-10 RX ADMIN — Medication 120 MILLIGRAM(S): at 05:29

## 2018-03-10 RX ADMIN — Medication 150 MILLIGRAM(S): at 05:54

## 2018-03-10 RX ADMIN — Medication 650 MILLIGRAM(S): at 05:30

## 2018-03-10 RX ADMIN — Medication 3 MILLILITER(S): at 17:49

## 2018-03-10 RX ADMIN — Medication 650 MILLIGRAM(S): at 05:25

## 2018-03-10 RX ADMIN — MYCOPHENOLATE MOFETIL 1000 MILLIGRAM(S): 250 CAPSULE ORAL at 20:02

## 2018-03-10 RX ADMIN — Medication 500000 UNIT(S): at 05:25

## 2018-03-10 RX ADMIN — PANTOPRAZOLE SODIUM 40 MILLIGRAM(S): 20 TABLET, DELAYED RELEASE ORAL at 05:23

## 2018-03-10 RX ADMIN — MYCOPHENOLATE MOFETIL 1000 MILLIGRAM(S): 250 CAPSULE ORAL at 08:00

## 2018-03-10 RX ADMIN — Medication 3 MILLILITER(S): at 12:42

## 2018-03-10 RX ADMIN — Medication 3 MILLILITER(S): at 05:25

## 2018-03-10 RX ADMIN — CEFEPIME 100 MILLIGRAM(S): 1 INJECTION, POWDER, FOR SOLUTION INTRAMUSCULAR; INTRAVENOUS at 22:00

## 2018-03-10 RX ADMIN — Medication 250 MILLIGRAM(S): at 17:49

## 2018-03-10 RX ADMIN — ATOVAQUONE 1500 MILLIGRAM(S): 750 SUSPENSION ORAL at 11:37

## 2018-03-10 RX ADMIN — ENOXAPARIN SODIUM 80 MILLIGRAM(S): 100 INJECTION SUBCUTANEOUS at 20:06

## 2018-03-10 RX ADMIN — Medication 100 MILLIGRAM(S): at 22:00

## 2018-03-10 NOTE — PROGRESS NOTE ADULT - PROBLEM SELECTOR PLAN 3
3rd digit right hand w/ ?embolic event-discolorization  plastics debrided for pus  will continue to monitor endo following for hyperglycemia

## 2018-03-10 NOTE — PROGRESS NOTE ADULT - ASSESSMENT
67 yr old male s/p  Heart transplant 2/23/18 for HF   H; GI bleed, LVAD implant, afib, anemia AICD  Intra op acute rejection of graft and Intraop IABP placed  and removed POD 1 and received plasmaphoresis x 2  2/26 UE +RT IJ inominate SC,cephalic DVT and +lt SC DVT  3/1  cardiac bx low grade rejection  3/2 bronchoscopy   neg   3/4  trf too floor,  Imipenum for low grade fevers  3/5 Sputum + PSA, ID to see pt  Vac changed to RLQ  3/6 Pt on cefepime  Remains on lovenox, planning on biopsy thursday, will need to hold   3/8 Cardiac biopsy this am - prograf level - 7- prograf level increased to 8mg po bid  toprol d/c'd,  Cardizem CD 120mg po initiated  hi filling pressures noted on right heart cath-lasix 40mg po daily started as per HF  K+= 6 - Bactrim d/c'd - Mepron to start in am for prophylaxis  rpt k+ = 5.2-  daily prograf levels, daily CXR, bid CMP & CBC  plastics consult for ?necrotic right 3rd digit  ** if SBP remains >120-130 will increase CardizemCD to 180mg po daily tomorrow 3/9  resume lvx 80mg sq bid tonight  Cardiac biopsy results pending- depending on biopsy results - HF to adjust prednisone taper  d/c planning  3/9 prednisone 12.5 bid.  Lasix x 1 in am tomorrow, then d/c.   Prograf 10.4, level increased from 7.9 so will need a prograf level tonight at 7 pm, Dr. Mccabe to be notified.  K elevated , if continues to rise may need  kaexalate  Finger with  possible necrosis 3rd R.  Tophus noted by rheumatology,+ crystals  specimen sent.  Hand /plastics consulted, finger  drained, may need further intervention  3/10  WBC to 67 yr old male s/p  Heart transplant 2/23/18 for HF   H; GI bleed, LVAD implant, afib, anemia AICD  Intra op acute rejection of graft and Intraop IABP placed  and removed POD 1 and received plasmaphoresis x 2  2/26 UE +RT IJ inominate SC,cephalic DVT and +lt SC DVT  3/1  cardiac bx low grade rejection  3/2 bronchoscopy   neg   3/4  trf too floor,  Imipenum for low grade fevers  3/5 Sputum + PSA, ID to see pt  Vac changed to RLQ  3/6 Pt on cefepime  Remains on lovenox, planning on biopsy thursday, will need to hold   3/8 Cardiac biopsy this am - prograf level - 7- prograf level increased to 8mg po bid  toprol d/c'd,  Cardizem CD 120mg po initiated  hi filling pressures noted on right heart cath-lasix 40mg po daily started as per HF  K+= 6 - Bactrim d/c'd - Mepron to start in am for prophylaxis  rpt k+ = 5.2-  daily prograf levels, daily CXR, bid CMP & CBC  plastics consult for ?necrotic right 3rd digit  ** if SBP remains >120-130 will increase CardizemCD to 180mg po daily tomorrow 3/9  resume lvx 80mg sq bid tonight  Cardiac biopsy results pending- depending on biopsy results - HF to adjust prednisone taper  d/c planning  3/9 prednisone 12.5 bid.  Lasix x 1 in am tomorrow, then d/c.   Prograf 10.4, level increased from 7.9 so will need a prograf level tonight at 7 pm, Dr. Mccabe to be notified.  K elevated , if continues to rise may need  kaexalate  Finger with  possible necrosis 3rd R.  Tophus noted by rheumatology,+ crystals  specimen sent.  Hand /plastics consulted, finger  drained, may need further intervention  3/10  WBC to 29   afebrile,  ct abdomen  chest pelvis ordered,   Prograf level 11,  vss

## 2018-03-10 NOTE — PROGRESS NOTE ADULT - PROBLEM SELECTOR PLAN 1
Bactrim d/c'd  yest as it causes hyperkalemi  will change prophylactic antibiotic to Mepron 1500mg po daily w/ food sp  heart transplant  2/23  on rejection meds with HF following,  LVAD site   with VAC in place for drainage  R heart cath monday  by Marvin Campbell- cardiac biopsy

## 2018-03-10 NOTE — PROGRESS NOTE ADULT - SUBJECTIVE AND OBJECTIVE BOX
PAGER:  [phone]               [facility] [id number]              EMAIL [email or web address]   OFFICE [phone]                              ********VASCULAR MEDICINE PROGRESS NOTE********                        CC:  UE DVT    INTERVAL HISTORY:    No issues.  Arms feel fine. RUE ACE wrap in place. Continues with lovenox       HISTORY OF PRESENT ILLNESS:  HPI:  67M PMH Chronic Systolic Heart Failure (ACC/AHA Stage D) due to NICMP s/p LVAD 6/12/17, GIB s/p Cautery (7/25/2017), known AV Malformations, Chronic Anemia due to numerous GIBs (off AC), A-Fib, VT s/p AICD. Pt admitted due to elevated LDH level of 646 for further evaluation and observation.  . Pt denies any chest pain, SOB, palpitations, N/D/V, abdominal pain, headaches, changes in vision, dizziness, pain or burning while urinating, swelling, numbness/tingling anywhere, rashes, fever, or recent travel. (01 Feb 2018 19:36)    MEDICATIONS  (STANDING):  ALBUTerol/ipratropium for Nebulization 3 milliLiter(s) Nebulizer every 6 hours  atovaquone Suspension 1500 milliGRAM(s) Oral daily  cefepime  IVPB      cefepime  IVPB 2000 milliGRAM(s) IV Intermittent every 8 hours  diltiazem    milliGRAM(s) Oral daily  docusate sodium 100 milliGRAM(s) Oral three times a day  enoxaparin Injectable 80 milliGRAM(s) SubCutaneous every 12 hours  insulin lispro (HumaLOG) corrective regimen sliding scale   SubCutaneous three times a day before meals  insulin lispro (HumaLOG) corrective regimen sliding scale   SubCutaneous at bedtime  mycophenolate mofetil 1000 milliGRAM(s) Oral <User Schedule>  nystatin    Suspension 238618 Unit(s) Oral every 6 hours  pantoprazole  Injectable 40 milliGRAM(s) IV Push two times a day  predniSONE   Tablet 12.5 milliGRAM(s) Oral <User Schedule>  tacrolimus 8 milliGRAM(s) Oral <User Schedule>  valGANciclovir 450 milliGRAM(s) Oral every 12 hours  vancomycin  IVPB 1000 milliGRAM(s) IV Intermittent every 12 hours    MEDICATIONS  (PRN):  acetaminophen   Tablet. 650 milliGRAM(s) Oral every 6 hours PRN Mild Pain (1 - 3)  MEDICATIONS  (PRN):  acetaminophen   Tablet. 650 milliGRAM(s) Oral every 6 hours PRN Mild Pain (1 - 3)  PAST MEDICAL & SURGICAL HISTORY:  GIB (gastrointestinal bleeding)  Ventricular fibrillation: s/p AICD  PAF (paroxysmal atrial fibrillation): on xarelto  Non-Ischemic Cardiomyopathy  SVT (Supraventricular Tachycardia)  HTN  CHF (Congestive Heart Failure)  LVAD (left ventricular assist device) present  Status post left hip replacement  History of Prior Ablation Treatment: for afib  AICD (Automatic Cardioverter/Defibrillator) Present: [employer] with 1 [employer] lead4/1/09- explanted and replaced with [employer] 2 leads on 9/2/09      FAMILY HISTORY:  No pertinent family history in first degree relatives      SOCIAL HISTORY:  unchanged    REVIEW OF SYSTEMS:  CONSTITUTIONAL: No fever, weight loss, or fatigue  EYES: No eye pain, visual disturbances, or discharge  ENMT:  No difficulty hearing, tinnitus, vertigo; No sinus or throat pain  NECK: No pain or stiffness  RESPIRATORY: No cough, wheezing, chills or hemoptysis; No Shortness of Breath  CARDIOVASCULAR: No chest pain, palpitations, passing out, dizziness, or leg swelling  GASTROINTESTINAL: No abdominal or epigastric pain. No nausea, vomiting, or hematemesis; No diarrhea or constipation. No melena or hematochezia.  GENITOURINARY: No dysuria, frequency, hematuria, or incontinence  NEUROLOGICAL: No headaches, memory loss, loss of strength, numbness, or tremors  SKIN: No itching, burning, rashes, or lesions   LYMPH Nodes: No enlarged glands  ENDOCRINE: No heat or cold intolerance; No hair loss  MUSCULOSKELETAL: No joint pain or swelling; No muscle, back, or extremity pain  PSYCHIATRIC: No depression, anxiety, mood swings, or difficulty sleeping  HEME/LYMPH: No easy bruising, or bleeding gums  ALLERY AND IMMUNOLOGIC: No hives or eczema	    [x ] All others negative	  [ ] Unable to obtain    ICU Vital Signs Last 24 Hrs  T(C): 36.8 (10 Mar 2018 15:06), Max: 37.1 (09 Mar 2018 19:35)  T(F): 98.3 (10 Mar 2018 15:06), Max: 98.8 (09 Mar 2018 19:35)  HR: 106 (10 Mar 2018 15:06) (93 - 106)  BP: 118/66 (10 Mar 2018 15:06) (109/55 - 122/70)  BP(mean): 90 (10 Mar 2018 05:26) (77 - 90)  ABP: --  ABP(mean): --  RR: 18 (10 Mar 2018 15:06) (17 - 18)  SpO2: 96% (10 Mar 2018 15:06) (93% - 99%)      Appearance: Normal, on high flow oxgyen  HEENT:   Normal oral mucosa, PERRL, EOMI	  Lymphatic: No lymphadenopathy  Cardiovascular: Normal S1 S2 tachycardia.   Respiratory: clear	  Psychiatry: A & O x 3, Mood & affect appropriate  Gastrointestinal:  Soft, Non-tender, + BS	  Skin: No rashes, No ecchymoses, No cyanosis	  Neurologic: Non-focal  Extremities: RUE edema.  3rd digit R hand ischemia, motion and sensation intact.                                   8.5    29.9  )-----------( 412      ( 10 Mar 2018 13:16 )             25.9   03-10    139  |  106  |  27<H>  ----------------------------<  134<H>  5.0   |  18<L>  |  1.13    Ca    8.2<L>      10 Mar 2018 13:16    TPro  6.2  /  Alb  3.0<L>  /  TBili  0.4  /  DBili  x   /  AST  24  /  ALT  25  /  AlkPhos  80  03-10

## 2018-03-10 NOTE — PROGRESS NOTE ADULT - ATTENDING COMMENTS
Feels well; has had good hemodynamics.  On cefepime for Pseudomonas in sputum.  Off Lasix. Appears euvolemic.  Will f/u tacrolimus level.  WBC slightly up. May be related to finger process, but will get CT to r/o occult infection. Feels well; has had good hemodynamics.  On cefepime for Pseudomonas in sputum.  Off Lasix. Appears euvolemic.  Will f/u tacrolimus level.  WBC slightly up. May be related to finger process, but will get CT to r/o occult infection.    Addendum: tacrolimus levels 11.0. Will continue current dose.    Pt seen with Dr. Parker. Will d/w Dr. Mccabe.

## 2018-03-10 NOTE — PROGRESS NOTE ADULT - SUBJECTIVE AND OBJECTIVE BOX
infectious diseases progress note:    Patient is a 67y old  Male who presents with a chief complaint of Elevated LDH levels (01 Feb 2018 19:36)        Heart replaced by heart assist device        ROS:  CONSTITUTIONAL:  Negative fever or chills, feels well, good appetite  EYES:  Negative  blurry vision or double vision  CARDIOVASCULAR:  Negative for chest pain or palpitations  RESPIRATORY:  Negative for cough, wheezing, or SOB   GASTROINTESTINAL:  Negative for nausea, vomiting, diarrhea, constipation, or abdominal pain  GENITOURINARY:  Negative frequency, urgency or dysuria  NEUROLOGIC:  No headache, confusion, dizziness, lightheadedness    Allergies    No Known Allergies    Intolerances        ANTIBIOTICS/RELEVANT:  antimicrobials  atovaquone Suspension 1500 milliGRAM(s) Oral daily  cefepime  IVPB      cefepime  IVPB 2000 milliGRAM(s) IV Intermittent every 8 hours  nystatin    Suspension 365556 Unit(s) Oral every 6 hours  valGANciclovir 450 milliGRAM(s) Oral every 12 hours  vancomycin  IVPB 750 milliGRAM(s) IV Intermittent every 12 hours    immunologic:  mycophenolate mofetil 1000 milliGRAM(s) Oral <User Schedule>  tacrolimus 8 milliGRAM(s) Oral <User Schedule>    OTHER:  acetaminophen   Tablet. 650 milliGRAM(s) Oral every 6 hours PRN  ALBUTerol/ipratropium for Nebulization 3 milliLiter(s) Nebulizer every 6 hours  diltiazem    milliGRAM(s) Oral daily  docusate sodium 100 milliGRAM(s) Oral three times a day  enoxaparin Injectable 80 milliGRAM(s) SubCutaneous every 12 hours  insulin lispro (HumaLOG) corrective regimen sliding scale   SubCutaneous three times a day before meals  insulin lispro (HumaLOG) corrective regimen sliding scale   SubCutaneous at bedtime  pantoprazole  Injectable 40 milliGRAM(s) IV Push two times a day  predniSONE   Tablet 12.5 milliGRAM(s) Oral <User Schedule>      Objective:  Vital Signs Last 24 Hrs  T(C): 36.3 (10 Mar 2018 07:24), Max: 37.1 (09 Mar 2018 19:35)  T(F): 97.3 (10 Mar 2018 07:24), Max: 98.8 (09 Mar 2018 19:35)  HR: 103 (10 Mar 2018 07:24) (93 - 105)  BP: 109/55 (10 Mar 2018 07:24) (109/55 - 122/70)  BP(mean): 90 (10 Mar 2018 05:26) (76 - 90)  RR: 18 (10 Mar 2018 07:24) (17 - 18)  SpO2: 97% (10 Mar 2018 07:24) (93% - 97%)    PHYSICAL EXAM:   well nourished--no acute distress  Eyes:WALT, EOMI  Ear/Nose/Throat: no oral lesion, no sinus tenderness on percussion	  Neck:no JVD, no lymphadenopathy, supple  Respiratory: CTA tara  Cardiovascular: S1S2 RRR, no murmurs  Gastrointestinal:soft, (+) BS, no HSM  Extremities finger has open ulcer  top material seen but no pus   old aicd site good         LABS:                        8.6    29.8  )-----------( 430      ( 10 Mar 2018 07:13 )             26.3     03-10    138  |  105  |  26<H>  ----------------------------<  128<H>  4.7   |  22  |  1.14    Ca    8.2<L>      10 Mar 2018 07:13    TPro  6.2  /  Alb  3.0<L>  /  TBili  0.4  /  DBili  x   /  AST  24  /  ALT  25  /  AlkPhos  80  03-10            MICROBIOLOGY:    RECENT CULTURES:  03-05 @ 17:59 Skin LVAD driveline site                No growth at 48 hours          RESPIRATORY CULTURES:              RADIOLOGY & ADDITIONAL STUDIES:        Pager [phone]  After 5 pm/weekends or if no response :[phone]

## 2018-03-10 NOTE — PROGRESS NOTE ADULT - SUBJECTIVE AND OBJECTIVE BOX
VITAL SIGNS    Telemetry:  NSR  100    Vital Signs Last 24 Hrs  T(C): 36.7 (03-10-18 @ 11:09), Max: 37.1 (18 @ 19:35)  T(F): 98 (03-10-18 @ 11:09), Max: 98.8 (18 @ 19:35)  HR: 98 (03-10-18 @ 11:09) (93 - 105)  BP: 118/65 (03-10-18 @ 11:09) (109/55 - 122/70)  RR: 18 (03-10-18 @ 11:09) (17 - 18)  SpO2: 99% (03-10-18 @ 11:09) (93% - 99%)            Daily Weight in k.8 (10 Mar 2018 05:26)      Bilirubin Total, Serum: 0.4 mg/dL (03-10 @ 07:13)    CAPILLARY BLOOD GLUCOSE      POCT Blood Glucose.: 141 mg/dL (10 Mar 2018 11:14)  POCT Blood Glucose.: 125 mg/dL (10 Mar 2018 07:54)  POCT Blood Glucose.: 108 mg/dL (09 Mar 2018 22:14)  POCT Blood Glucose.: 125 mg/dL (09 Mar 2018 19:29)  POCT Blood Glucose.: 110 mg/dL (09 Mar 2018 16:45)          Drains:    vac to rt abdomen                      PHYSICAL EXAM    Neurology: alert and oriented x 3, moves all extremities with no defecits  CV :  RRR  Sternal Wound :  CDI , Stable  Lungs:  diminished b/l bases  Abdomen: soft, nontender, nondistended, positive bowel sounds 3/9  Extremities:     r middle finger edematous with good hand pulses,  b/l  trace pedel edema

## 2018-03-10 NOTE — PROGRESS NOTE ADULT - PROBLEM SELECTOR PLAN 2
sp  heart transplant  2/23  on rejection meds with HF following,  LVAD site   with VAC in place for drainage  R heart cath monday  by Marvin Campbell- cardiac biopsy 3rd digit right hand w/ ?embolic event-discolorization  plastics debrided for pus  will continue to monitor

## 2018-03-10 NOTE — PROGRESS NOTE ADULT - SUBJECTIVE AND OBJECTIVE BOX
24H hour events: Prograf level yesterday wnl. S/p RHC 3/8 with slightly with elevated filling pressures, given an additional dose of lasix, now d/c'd. Seen by rheumatology and s/p drainage of digit.    MEDICATIONS:  diltiazem    milliGRAM(s) Oral daily  enoxaparin Injectable 80 milliGRAM(s) SubCutaneous every 12 hours  atovaquone Suspension 1500 milliGRAM(s) Oral daily  cefepime  IVPB      cefepime  IVPB 2000 milliGRAM(s) IV Intermittent every 8 hours  nystatin    Suspension 109994 Unit(s) Oral every 6 hours  valGANciclovir 450 milliGRAM(s) Oral every 12 hours  vancomycin  IVPB 750 milliGRAM(s) IV Intermittent every 12 hours  ALBUTerol/ipratropium for Nebulization 3 milliLiter(s) Nebulizer every 6 hours  acetaminophen   Tablet. 650 milliGRAM(s) Oral every 6 hours PRN  docusate sodium 100 milliGRAM(s) Oral three times a day  pantoprazole  Injectable 40 milliGRAM(s) IV Push two times a day  insulin lispro (HumaLOG) corrective regimen sliding scale   SubCutaneous three times a day before meals  insulin lispro (HumaLOG) corrective regimen sliding scale   SubCutaneous at bedtime  predniSONE   Tablet 12.5 milliGRAM(s) Oral <User Schedule>  mycophenolate mofetil 1000 milliGRAM(s) Oral <User Schedule>  tacrolimus 8 milliGRAM(s) Oral <User Schedule>    REVIEW OF SYSTEMS:  Complete 10point ROS negative except as documented above.    PHYSICAL EXAM:  T(C): 36.3 (03-10-18 @ 07:24), Max: 37.1 (03-09-18 @ 19:35)  HR: 103 (03-10-18 @ 07:24) (93 - 105)  BP: 109/55 (03-10-18 @ 07:24) (109/55 - 122/70)  RR: 18 (03-10-18 @ 07:24) (17 - 18)  SpO2: 97% (03-10-18 @ 07:24) (93% - 97%)  Wt(kg): --  I&O's Summary    09 Mar 2018 07:01  -  10 Mar 2018 07:00  --------------------------------------------------------  IN: 1650 mL / OUT: 2700 mL / NET: -1050 mL      Appearance: Normal	  HEENT:   Normal oral mucosa, PERRL, EOMI	  Lymphatic: No lymphadenopathy  Cardiovascular: Normal S1 S2, No JVD, No murmurs, No edema  Respiratory: Lungs clear to auscultation	  Psychiatry: A & O x 3, Mood & affect appropriate  Gastrointestinal:  Soft, Non-tender, + BS	  Skin: No rashes, No ecchymoses, No cyanosis	  Neurologic: Non-focal  Extremities: Normal range of motion, No clubbing, cyanosis or edema  Vascular: Peripheral pulses palpable 2+ bilaterally        LABS:	 	  CBC Full  -  ( 10 Mar 2018 07:13 )  WBC Count : 29.8 K/uL  Hemoglobin : 8.6 g/dL  Hematocrit : 26.3 %  Platelet Count - Automated : 430 K/uL    03-10    138  |  105  |  26<H>  ----------------------------<  128<H>  4.7   |  22  |  1.14    Ca    8.2<L>      10 Mar 2018 07:13    TPro  6.2  /  Alb  3.0<L>  /  TBili  0.4  /  DBili  x   /  AST  24  /  ALT  25  /  AlkPhos  80  03-10        TELEMETRY: 	    ECG:  	  RADIOLOGY:  OTHER: 	    	  ASSESSMENT/PLAN: 	  This is a 67 year old man with ACC/AHA stage D chronic systolic heart failure due to NICM (LVEF 20%) s/p HM2 LVAD 6/17 c/b pump thrombus now 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 Underwent biopsy which showed CMR 1R, AMR 1 (no cellular injury). Repeat DSA negative. S/p drainage of distal digit by plastics with alleviation of pain.    #Hyperkalemia  - Resolved, continue to monitor  - bactrim d/c'd, continue with Mepron   - low K diet    #Hypertension  - now of Diltiazem 120mg CD; SBPs now under 120. Would continue current dose     # Immunosuppression prophylaxis:  Tacrolimus - started on prograf 2/25; now on 8mg BID please check levels at 7:30 am (stat)  Continue CellCept 1000 mg PO BID  Steroids - continue taper; prednisone changed to 12.5mg BID 3/8.   	  # Antimicrobial prophylaxis:  Intermediate risk CMV - on oral valganciclovir 450 mg q12  Intermediate risk Toxo - on Mepron  PCP - on Mepron    # Graft function:  LV function improved; persistent mild RV dysfunction however excellent HD off inotropes  underwent biospy 3/1; positive for grade 1R/AMR 1; DSA negative  Biopsy #2 results pending  Last TTE with RV dysfunction but improved since previous    # Hypoxia:  saturating above 93% on room air;  c/w incentive spirometer    # Other prophylaxis:  Protonix 40 mg PO daily for GI prophylaxis while on steroids.    # Bilateral IJ/Subclavian thrombi  - HIT Ab positive; CHERIE negative; c/w lovenox  - appreciate vasc sx/card f/u  - plastics/hand rheum to evaluation 3rd right digit for ischemia/?gout/infection. defer tx for acute gout per rheum. s/p drainage by plastics    # CAV PPx  - will start ASA when more stable  - statin prior to discharge    # ID - afebrile,  WBC declining slightly; serosanguinous drainage  - f/u cultures  - on cefepime for pseudomonas in sputum  - started on Vanc.   - Plastics and rheum following 3rd right digit possible infection vs crystaline arthropathy +/- ischemic digit  - f/u culture from finger debridement (3/9)  - f/u ID recs.       Cedrick Zavala 24H hour events: Prograf level yesterday wnl. S/p RHC 3/8 with slightly elevated filling pressures, given an additional dose of lasix, now d/c'd. Seen by rheumatology and s/p drainage of fluid collection in digit.  This morning feels very well. Strong appetite. Finger pain very similar to previous episodes of gout.    MEDICATIONS:  diltiazem    milliGRAM(s) Oral daily  enoxaparin Injectable 80 milliGRAM(s) SubCutaneous every 12 hours  atovaquone Suspension 1500 milliGRAM(s) Oral daily  cefepime  IVPB      cefepime  IVPB 2000 milliGRAM(s) IV Intermittent every 8 hours  nystatin    Suspension [id number] Unit(s) Oral every 6 hours  valGANciclovir 450 milliGRAM(s) Oral every 12 hours  vancomycin  IVPB 750 milliGRAM(s) IV Intermittent every 12 hours  ALBUTerol/ipratropium for Nebulization 3 milliLiter(s) Nebulizer every 6 hours  acetaminophen   Tablet. 650 milliGRAM(s) Oral every 6 hours PRN  docusate sodium 100 milliGRAM(s) Oral three times a day  pantoprazole  Injectable 40 milliGRAM(s) IV Push two times a day  insulin lispro (HumaLOG) corrective regimen sliding scale   SubCutaneous three times a day before meals  insulin lispro (HumaLOG) corrective regimen sliding scale   SubCutaneous at bedtime  predniSONE   Tablet 12.5 milliGRAM(s) Oral <User Schedule>  mycophenolate mofetil 1000 milliGRAM(s) Oral <User Schedule>  tacrolimus 8 milliGRAM(s) Oral <User Schedule>    REVIEW OF SYSTEMS:  Complete 10point ROS negative except as documented above.    PHYSICAL EXAM:  T(C): 36.3 (03-10-18 @ 07:24), Max: 37.1 (03-09-18 @ 19:35)  HR: 103 (03-10-18 @ 07:24) (93 - 105)  BP: 109/55 (03-10-18 @ 07:24) (109/55 - 122/70)  RR: 18 (03-10-18 @ 07:24) (17 - 18)  SpO2: 97% (03-10-18 @ 07:24) (93% - 97%)  Wt(kg): --  I&O's Summary    09 Mar 2018 07:01  -  10 Mar 2018 07:00  --------------------------------------------------------  IN: 1650 mL / OUT: 2700 mL / NET: -1050 mL      Appearance: Normal	  HEENT:   Normal oral mucosa, PERRL, EOMI	  Lymphatic: No lymphadenopathy  Cardiovascular: Normal S1 S2, No JVD, No murmurs, No edema  Respiratory: Lungs clear to auscultation	  Psychiatry: A & O x 3, Mood & affect appropriate  Gastrointestinal:  Soft, Non-tender, + BS	  Skin: No rashes, No ecchymoses, No cyanosis	  Neurologic: Non-focal  Extremities: Normal range of motion, finger with small ulceration and discolored tip    LABS:	 	  CBC Full  -  ( 10 Mar 2018 07:13 )  WBC Count : 29.8 K/uL  Hemoglobin : 8.6 g/dL  Hematocrit : 26.3 %  Platelet Count - Automated : 430 K/uL    03-10    138  |  105  |  26<H>  ----------------------------<  128<H>  4.7   |  22  |  1.14    Ca    8.2<L>      10 Mar 2018 07:13    TPro  6.2  /  Alb  3.0<L>  /  TBili  0.4  /  DBili  x   /  AST  24  /  ALT  25  /  AlkPhos  80  03-10        TELEMETRY: 	    ECG:  	  RADIOLOGY:  OTHER: 	    	  ASSESSMENT/PLAN: 	  This is a 67 year old man with ACC/AHA stage D chronic systolic heart failure due to NICM (LVEF 20%) s/p HM2 LVAD 6/17 c/b pump thrombus now 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 Underwent biopsy which showed CMR 1R, AMR 1 (no cellular injury). Repeat DSA negative. S/p drainage of distal digit by plastics with alleviation of pain. Crystals visualized in fluid. 2nd myocardial biopsy was unremarkable.    #Hyperkalemia  - Resolved, continue to monitor  - bactrim d/c'd, continue with Mepron   - low K diet    #Hypertension  - now of Diltiazem 120mg CD; SBPs now under 120. Would continue current dose     # Immunosuppression prophylaxis:  Tacrolimus - started on prograf 2/25; now on 8mg BID please check levels at 7:30 am (stat). F/u level this morning.  Continue CellCept 1000 mg PO BID  Steroids - continue taper; prednisone changed to 12.5mg BID 3/8.   	  # Antimicrobial prophylaxis:  Intermediate risk CMV - on oral valganciclovir 450 mg q12  Intermediate risk Toxo - on Mepron  PCP - on Mepron    # Graft function:  LV function improved; persistent mild RV dysfunction however excellent HD off inotropes  underwent biospy 3/1; positive for grade 1R/AMR 1; DSA negative  Biopsy #2 results pending; by report, was unremarkable.  Last TTE with RV dysfunction but improved since previous    # Hypoxia:  saturating above 93% on room air;  c/w incentive spirometer    # Other prophylaxis:  Protonix 40 mg PO daily for GI prophylaxis while on steroids.    # Bilateral IJ/Subclavian thrombi  - HIT Ab positive; CHERIE negative; c/w lovenox  - appreciate vasc sx/card f/u  - plastics/hand rheum to evaluation 3rd right digit for ischemia/?gout/infection. defer tx for acute gout per rheum. s/p drainage by plastics    # CAV PPx  - will start ASA when more stable  - statin prior to discharge    # ID - afebrile,  WBC declining slightly; serosanguinous drainage  - f/u cultures  - on cefepime for pseudomonas in sputum  - started on Vanc.   - Plastics and rheum following 3rd right digit possible infection vs crystaline arthropathy +/- ischemic digit  - f/u culture from finger debridement (3/9)  - WBC ct was up today. Will get CT scan to r/o occult infection. 24H hour events: Prograf level yesterday wnl. S/p RHC 3/8 with slightly elevated filling pressures, given an additional dose of lasix, now d/c'd. Seen by rheumatology and s/p drainage of fluid collection in digit.  This morning feels very well. Strong appetite. Finger pain very similar to previous episodes of gout.    MEDICATIONS:  diltiazem    milliGRAM(s) Oral daily  enoxaparin Injectable 80 milliGRAM(s) SubCutaneous every 12 hours  atovaquone Suspension 1500 milliGRAM(s) Oral daily  cefepime  IVPB      cefepime  IVPB 2000 milliGRAM(s) IV Intermittent every 8 hours  nystatin    Suspension [id number] Unit(s) Oral every 6 hours  valGANciclovir 450 milliGRAM(s) Oral every 12 hours  vancomycin  IVPB 750 milliGRAM(s) IV Intermittent every 12 hours  ALBUTerol/ipratropium for Nebulization 3 milliLiter(s) Nebulizer every 6 hours  acetaminophen   Tablet. 650 milliGRAM(s) Oral every 6 hours PRN  docusate sodium 100 milliGRAM(s) Oral three times a day  pantoprazole  Injectable 40 milliGRAM(s) IV Push two times a day  insulin lispro (HumaLOG) corrective regimen sliding scale   SubCutaneous three times a day before meals  insulin lispro (HumaLOG) corrective regimen sliding scale   SubCutaneous at bedtime  predniSONE   Tablet 12.5 milliGRAM(s) Oral <User Schedule>  mycophenolate mofetil 1000 milliGRAM(s) Oral <User Schedule>  tacrolimus 8 milliGRAM(s) Oral <User Schedule>    REVIEW OF SYSTEMS:  Complete 10point ROS negative except as documented above.    PHYSICAL EXAM:  T(C): 36.3 (03-10-18 @ 07:24), Max: 37.1 (03-09-18 @ 19:35)  HR: 103 (03-10-18 @ 07:24) (93 - 105)  BP: 109/55 (03-10-18 @ 07:24) (109/55 - 122/70)  RR: 18 (03-10-18 @ 07:24) (17 - 18)  SpO2: 97% (03-10-18 @ 07:24) (93% - 97%)  Wt(kg): --  I&O's Summary    09 Mar 2018 07:01  -  10 Mar 2018 07:00  --------------------------------------------------------  IN: 1650 mL / OUT: 2700 mL / NET: -1050 mL      Appearance: Normal	  HEENT:   Normal oral mucosa, PERRL, EOMI	  Lymphatic: No lymphadenopathy  Cardiovascular: Normal S1 S2, No JVD, No murmurs, No edema  Respiratory: Lungs clear to auscultation	  Psychiatry: A & O x 3, Mood & affect appropriate  Gastrointestinal:  Soft, Non-tender, + BS	  Skin: No rashes, No ecchymoses, No cyanosis	  Neurologic: Non-focal  Extremities: Normal range of motion, finger with small ulceration and discolored tip    LABS:	 	  CBC Full  -  ( 10 Mar 2018 07:13 )  WBC Count : 29.8 K/uL  Hemoglobin : 8.6 g/dL  Hematocrit : 26.3 %  Platelet Count - Automated : 430 K/uL    03-10    138  |  105  |  26<H>  ----------------------------<  128<H>  4.7   |  22  |  1.14    Ca    8.2<L>      10 Mar 2018 07:13    TPro  6.2  /  Alb  3.0<L>  /  TBili  0.4  /  DBili  x   /  AST  24  /  ALT  25  /  AlkPhos  80  03-10        TELEMETRY: 	    ECG:  	  RADIOLOGY:  OTHER: 	    	  ASSESSMENT/PLAN: 	  This is a 67 year old man with ACC/AHA stage D chronic systolic heart failure due to NICM (LVEF 20%) s/p HM2 LVAD 6/17 c/b pump thrombus now 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 Underwent biopsy which showed CMR 1R, AMR 1 (no cellular injury). Repeat DSA negative. S/p drainage of distal digit by plastics with alleviation of pain. Crystals visualized in fluid. 2nd myocardial biopsy was 1R grade.    #Hyperkalemia  - Resolved, continue to monitor  - bactrim d/c'd, continue with Mepron   - low K diet    #Hypertension  - now of Diltiazem 120mg CD; SBPs now under 120. Would continue current dose     # Immunosuppression prophylaxis:  Tacrolimus - started on prograf 2/25; now on 8mg BID please check levels at 7:30 am (stat). F/u level this morning.  Continue CellCept 1000 mg PO BID  Steroids - continue taper; prednisone changed to 12.5mg BID 3/8.   	  # Antimicrobial prophylaxis:  Intermediate risk CMV - on oral valganciclovir 450 mg q12  Intermediate risk Toxo - on Mepron  PCP - on Mepron    # Graft function:  LV function improved; persistent mild RV dysfunction however excellent HD off inotropes  underwent biospy 3/1; positive for grade 1R/AMR 1; DSA negative  Biopsy #2 results pending; by report, was unremarkable.  Last TTE with RV dysfunction but improved since previous    # Hypoxia:  saturating above 93% on room air;  c/w incentive spirometer    # Other prophylaxis:  Protonix 40 mg PO daily for GI prophylaxis while on steroids.    # Bilateral IJ/Subclavian thrombi  - HIT Ab positive; CHERIE negative; c/w lovenox  - appreciate vasc sx/card f/u  - plastics/hand rheum to evaluation 3rd right digit for ischemia/?gout/infection. defer tx for acute gout per rheum. s/p drainage by plastics    # CAV PPx  - will start ASA when more stable  - statin prior to discharge    # ID - afebrile,  WBC declining slightly; serosanguinous drainage  - f/u cultures  - on cefepime for pseudomonas in sputum  - started on Vanc.   - Plastics and rheum following 3rd right digit possible infection vs crystaline arthropathy +/- ischemic digit  - f/u culture from finger debridement (3/9)  - WBC ct was up today. Will get CT scan to r/o occult infection.

## 2018-03-10 NOTE — PROGRESS NOTE ADULT - ASSESSMENT
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 Patient on broad federica-operative prophylaxis to Vancomycin/Meropenem/Fluconazole based upon his prior LVAD placement and prolonged hospitalization pre-transplant. Intermediate risk for CMV (seropositive); intermediate risk for toxoplasmosis (seropositive R). Improved aeration today at right lung base. He had Significant amount of sero-sanguinous drainage from old LVAD exit site but cultures are NGTD    - Plan:  Continue OI prophylaxis with Valganciclovir, Nystatin, Bactrim changed to Mepron by transplant team for PCP prophylaxis because of elevated potassium.    Receiving Cefepime 2gm q8hr for RLL atelectasis/?pneumonia with CR pseudomonas in sputum   Placed on IV Vancomycin 750mg q 12hr given increasing WBC and drainage from old LVAD site Vanco trough= 8.6 would increase dose to 1gram q 12hr.  clinically looks well without signs or symptoms of pna     his finger is of concern but the site does look more like crystal disease than infection  to continue present antibiotics    discussed with Dr. burrell  for ct scan: elevated WBC could also be related to thrombosis On AC

## 2018-03-10 NOTE — PROGRESS NOTE ADULT - ASSESSMENT
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     Upper extremity DVT  - Continue Lovenox 1mg/kg BID   - elevate arms above heart level and continue with light compression to R arm with 4 inch ace wrap from hand to axilla.    - appreciate plastics recs/rheum  regarding R 3rd digit - continue with current therapy  - patient has bilateral Subclavian DVT - would avoid catheter placement to the area if possible.    - obtain repeat upper extremity bilateral venous duplex Monday.     Will follow with you    Thanks    Saurabh Tavarez  [id number]

## 2018-03-10 NOTE — PROGRESS NOTE ADULT - PROBLEM SELECTOR PLAN 4
endo following for hyperglycemia multiple clots  Lt Sc DVT, cephalic DVT, rt IJ innominate clot     awaiting PICC line eval   for abx and access

## 2018-03-11 LAB
ANION GAP SERPL CALC-SCNC: 10 MMOL/L — SIGNIFICANT CHANGE UP (ref 5–17)
ANION GAP SERPL CALC-SCNC: 13 MMOL/L — SIGNIFICANT CHANGE UP (ref 5–17)
BUN SERPL-MCNC: 24 MG/DL — HIGH (ref 7–23)
BUN SERPL-MCNC: 27 MG/DL — HIGH (ref 7–23)
CALCIUM SERPL-MCNC: 8.4 MG/DL — SIGNIFICANT CHANGE UP (ref 8.4–10.5)
CALCIUM SERPL-MCNC: 8.5 MG/DL — SIGNIFICANT CHANGE UP (ref 8.4–10.5)
CHLORIDE SERPL-SCNC: 107 MMOL/L — SIGNIFICANT CHANGE UP (ref 96–108)
CHLORIDE SERPL-SCNC: 109 MMOL/L — HIGH (ref 96–108)
CO2 SERPL-SCNC: 19 MMOL/L — LOW (ref 22–31)
CO2 SERPL-SCNC: 21 MMOL/L — LOW (ref 22–31)
CREAT SERPL-MCNC: 1.13 MG/DL — SIGNIFICANT CHANGE UP (ref 0.5–1.3)
CREAT SERPL-MCNC: 1.16 MG/DL — SIGNIFICANT CHANGE UP (ref 0.5–1.3)
CULTURE RESULTS: NO GROWTH — SIGNIFICANT CHANGE UP
GLUCOSE BLDC GLUCOMTR-MCNC: 111 MG/DL — HIGH (ref 70–99)
GLUCOSE BLDC GLUCOMTR-MCNC: 112 MG/DL — HIGH (ref 70–99)
GLUCOSE BLDC GLUCOMTR-MCNC: 114 MG/DL — HIGH (ref 70–99)
GLUCOSE BLDC GLUCOMTR-MCNC: 122 MG/DL — HIGH (ref 70–99)
GLUCOSE SERPL-MCNC: 101 MG/DL — HIGH (ref 70–99)
GLUCOSE SERPL-MCNC: 109 MG/DL — HIGH (ref 70–99)
GRAM STN FLD: SIGNIFICANT CHANGE UP
HCT VFR BLD CALC: 24.7 % — LOW (ref 39–50)
HCT VFR BLD CALC: 26.1 % — LOW (ref 39–50)
HGB BLD-MCNC: 8 G/DL — LOW (ref 13–17)
HGB BLD-MCNC: 8.5 G/DL — LOW (ref 13–17)
MCHC RBC-ENTMCNC: 30.9 PG — SIGNIFICANT CHANGE UP (ref 27–34)
MCHC RBC-ENTMCNC: 31 PG — SIGNIFICANT CHANGE UP (ref 27–34)
MCHC RBC-ENTMCNC: 32.5 GM/DL — SIGNIFICANT CHANGE UP (ref 32–36)
MCHC RBC-ENTMCNC: 32.5 GM/DL — SIGNIFICANT CHANGE UP (ref 32–36)
MCV RBC AUTO: 94.9 FL — SIGNIFICANT CHANGE UP (ref 80–100)
MCV RBC AUTO: 95.1 FL — SIGNIFICANT CHANGE UP (ref 80–100)
PLATELET # BLD AUTO: 398 K/UL — SIGNIFICANT CHANGE UP (ref 150–400)
PLATELET # BLD AUTO: 401 K/UL — HIGH (ref 150–400)
POTASSIUM SERPL-MCNC: 4.8 MMOL/L — SIGNIFICANT CHANGE UP (ref 3.5–5.3)
POTASSIUM SERPL-MCNC: 5.1 MMOL/L — SIGNIFICANT CHANGE UP (ref 3.5–5.3)
POTASSIUM SERPL-SCNC: 4.8 MMOL/L — SIGNIFICANT CHANGE UP (ref 3.5–5.3)
POTASSIUM SERPL-SCNC: 5.1 MMOL/L — SIGNIFICANT CHANGE UP (ref 3.5–5.3)
RBC # BLD: 2.6 M/UL — LOW (ref 4.2–5.8)
RBC # BLD: 2.75 M/UL — LOW (ref 4.2–5.8)
RBC # FLD: 16.8 % — HIGH (ref 10.3–14.5)
RBC # FLD: 16.8 % — HIGH (ref 10.3–14.5)
SODIUM SERPL-SCNC: 139 MMOL/L — SIGNIFICANT CHANGE UP (ref 135–145)
SODIUM SERPL-SCNC: 140 MMOL/L — SIGNIFICANT CHANGE UP (ref 135–145)
SPECIMEN SOURCE: SIGNIFICANT CHANGE UP
SPECIMEN SOURCE: SIGNIFICANT CHANGE UP
TACROLIMUS SERPL-MCNC: 14.2 NG/ML — SIGNIFICANT CHANGE UP
VANCOMYCIN TROUGH SERPL-MCNC: 15.7 UG/ML — SIGNIFICANT CHANGE UP (ref 10–20)
WBC # BLD: 24.4 K/UL — HIGH (ref 3.8–10.5)
WBC # BLD: 28.5 K/UL — HIGH (ref 3.8–10.5)
WBC # FLD AUTO: 24.4 K/UL — HIGH (ref 3.8–10.5)
WBC # FLD AUTO: 28.5 K/UL — HIGH (ref 3.8–10.5)

## 2018-03-11 PROCEDURE — 99233 SBSQ HOSP IP/OBS HIGH 50: CPT

## 2018-03-11 PROCEDURE — 71045 X-RAY EXAM CHEST 1 VIEW: CPT | Mod: 26

## 2018-03-11 PROCEDURE — 71045 X-RAY EXAM CHEST 1 VIEW: CPT | Mod: 26,77

## 2018-03-11 RX ADMIN — Medication 12.5 MILLIGRAM(S): at 19:56

## 2018-03-11 RX ADMIN — Medication 12.5 MILLIGRAM(S): at 08:07

## 2018-03-11 RX ADMIN — CEFEPIME 100 MILLIGRAM(S): 1 INJECTION, POWDER, FOR SOLUTION INTRAMUSCULAR; INTRAVENOUS at 06:07

## 2018-03-11 RX ADMIN — Medication 120 MILLIGRAM(S): at 06:08

## 2018-03-11 RX ADMIN — TACROLIMUS 8 MILLIGRAM(S): 5 CAPSULE ORAL at 19:57

## 2018-03-11 RX ADMIN — Medication 3 MILLILITER(S): at 17:34

## 2018-03-11 RX ADMIN — Medication 250 MILLIGRAM(S): at 06:07

## 2018-03-11 RX ADMIN — VALGANCICLOVIR 450 MILLIGRAM(S): 450 TABLET, FILM COATED ORAL at 08:07

## 2018-03-11 RX ADMIN — Medication 500000 UNIT(S): at 00:35

## 2018-03-11 RX ADMIN — ENOXAPARIN SODIUM 80 MILLIGRAM(S): 100 INJECTION SUBCUTANEOUS at 06:09

## 2018-03-11 RX ADMIN — Medication 500000 UNIT(S): at 06:08

## 2018-03-11 RX ADMIN — MYCOPHENOLATE MOFETIL 1000 MILLIGRAM(S): 250 CAPSULE ORAL at 08:07

## 2018-03-11 RX ADMIN — ATOVAQUONE 1500 MILLIGRAM(S): 750 SUSPENSION ORAL at 13:04

## 2018-03-11 RX ADMIN — CEFEPIME 100 MILLIGRAM(S): 1 INJECTION, POWDER, FOR SOLUTION INTRAMUSCULAR; INTRAVENOUS at 13:05

## 2018-03-11 RX ADMIN — Medication 500000 UNIT(S): at 13:04

## 2018-03-11 RX ADMIN — ENOXAPARIN SODIUM 80 MILLIGRAM(S): 100 INJECTION SUBCUTANEOUS at 17:34

## 2018-03-11 RX ADMIN — Medication 500000 UNIT(S): at 17:34

## 2018-03-11 RX ADMIN — Medication 3 MILLILITER(S): at 06:07

## 2018-03-11 RX ADMIN — TACROLIMUS 8 MILLIGRAM(S): 5 CAPSULE ORAL at 08:08

## 2018-03-11 RX ADMIN — MYCOPHENOLATE MOFETIL 1000 MILLIGRAM(S): 250 CAPSULE ORAL at 19:59

## 2018-03-11 RX ADMIN — Medication 250 MILLIGRAM(S): at 19:46

## 2018-03-11 RX ADMIN — PANTOPRAZOLE SODIUM 40 MILLIGRAM(S): 20 TABLET, DELAYED RELEASE ORAL at 17:34

## 2018-03-11 RX ADMIN — PANTOPRAZOLE SODIUM 40 MILLIGRAM(S): 20 TABLET, DELAYED RELEASE ORAL at 06:09

## 2018-03-11 RX ADMIN — Medication 3 MILLILITER(S): at 13:04

## 2018-03-11 RX ADMIN — VALGANCICLOVIR 450 MILLIGRAM(S): 450 TABLET, FILM COATED ORAL at 19:59

## 2018-03-11 RX ADMIN — CEFEPIME 100 MILLIGRAM(S): 1 INJECTION, POWDER, FOR SOLUTION INTRAMUSCULAR; INTRAVENOUS at 22:36

## 2018-03-11 NOTE — PROGRESS NOTE ADULT - ASSESSMENT
67 yr old male s/p  Heart transplant 2/23/18 for HF   H; GI bleed, LVAD implant, afib, anemia AICD  Intra op acute rejection of graft and Intraop IABP placed  and removed POD 1 and received plasmaphoresis x 2  2/26 UE +RT IJ inominate SC,cephalic DVT and +lt SC DVT  3/1  cardiac bx low grade rejection  3/2 bronchoscopy   neg   3/4  trf too floor,  Imipenum for low grade fevers  3/5 Sputum + PSA, ID to see pt  Vac changed to RLQ  3/6 Pt on cefepime  Remains on lovenox, planning on biopsy thursday, will need to hold   3/8 Cardiac biopsy this am - prograf level - 7- prograf level increased to 8mg po bid  toprol d/c'd,  Cardizem CD 120mg po initiated  hi filling pressures noted on right heart cath-lasix 40mg po daily started as per HF  K+= 6 - Bactrim d/c'd - Mepron to start in am for prophylaxis  rpt k+ = 5.2-  daily prograf levels, daily CXR, bid CMP & CBC  plastics consult for ?necrotic right 3rd digit  ** if SBP remains >120-130 will increase CardizemCD to 180mg po daily tomorrow 3/9  resume lvx 80mg sq bid tonight  Cardiac biopsy results pending- depending on biopsy results - HF to adjust prednisone taper  d/c planning  3/9 prednisone 12.5 bid.  Lasix x 1 in am tomorrow, then d/c.   Prograf 10.4, level increased from 7.9 so will need a prograf level tonight at 7 pm, Dr. Mccabe to be notified.  K elevated , if continues to rise may need  kaexalate  Finger with  possible necrosis 3rd R.  Tophus noted by rheumatology,+ crystals  specimen sent.  Hand /plastics consulted, finger  drained, may need further intervention  3/10  WBC to 29   afebrile,  ct abdomen  chest pelvis ordered,   Prograf level 11,  vss   3/11 wbc trending down.  CT chest abd pelvis :  < from: CT Chest No Cont (03.10.18 @ 15:47) >  IMPRESSION:     Small fluid collection inferior to the xiphoid process likely the site of   prior drain.    7 mm nodule in the lingula, new from prior imaging.    No evidence of infection in the abdomen or pelvis.    < end of copied text >  Hct down , if  decreases further on repeat labs , PRBC to be given  Finger  with less discomfort, no drainage today unable to culture.  Minimal drainage after it was drained on  3/9, culture not sent during   BC 3/9 neg to date  UA neg  Driveline site  cx negative from 3/5  WBC trending down  Tacro leve 14.2, D/W hf , MAINTAIN CURRENT DOSAGE  Cont IV abx  Cont lovenox for dvt

## 2018-03-11 NOTE — PROGRESS NOTE ADULT - ATTENDING COMMENTS
See above.  -Still bothered by finger. Will ask rheum about starting colchicine.  -WBC ct down today. CT scan with no signs of active infection.  -Continue tacrolimus at 8 mg po bid.

## 2018-03-11 NOTE — PROGRESS NOTE ADULT - PROBLEM SELECTOR PLAN 2
3rd digit right hand w/ ?embolic event-discolorization  plastics debrided for pus  will continue to monitor

## 2018-03-11 NOTE — PROGRESS NOTE ADULT - PROBLEM SELECTOR PLAN 1
sp  heart transplant  2/23  on rejection meds with HF following,  LVAD site   with VAC in place for drainage  R heart cath monday  by Marvin Campbell- cardiac biopsy

## 2018-03-11 NOTE — PROGRESS NOTE ADULT - SUBJECTIVE AND OBJECTIVE BOX
24H hour events: No acute events. Sitting upright in chair.    MEDICATIONS:  diltiazem    milliGRAM(s) Oral daily  enoxaparin Injectable 80 milliGRAM(s) SubCutaneous every 12 hours  atovaquone Suspension 1500 milliGRAM(s) Oral daily  cefepime  IVPB      cefepime  IVPB 2000 milliGRAM(s) IV Intermittent every 8 hours  nystatin    Suspension 814286 Unit(s) Oral every 6 hours  valGANciclovir 450 milliGRAM(s) Oral every 12 hours  vancomycin  IVPB 1000 milliGRAM(s) IV Intermittent every 12 hours  ALBUTerol/ipratropium for Nebulization 3 milliLiter(s) Nebulizer every 6 hours  acetaminophen   Tablet. 650 milliGRAM(s) Oral every 6 hours PRN  docusate sodium 100 milliGRAM(s) Oral three times a day  pantoprazole  Injectable 40 milliGRAM(s) IV Push two times a day  insulin lispro (HumaLOG) corrective regimen sliding scale   SubCutaneous three times a day before meals  insulin lispro (HumaLOG) corrective regimen sliding scale   SubCutaneous at bedtime  predniSONE   Tablet 12.5 milliGRAM(s) Oral <User Schedule>  mycophenolate mofetil 1000 milliGRAM(s) Oral <User Schedule>  tacrolimus 8 milliGRAM(s) Oral <User Schedule>      REVIEW OF SYSTEMS:  Complete 10point ROS negative except as documented above.    PHYSICAL EXAM:  T(C): 36.6 (03-11-18 @ 10:07), Max: 37 (03-11-18 @ 06:00)  HR: 105 (03-11-18 @ 10:07) (96 - 106)  BP: 113/62 (03-11-18 @ 10:07) (113/62 - 134/74)  RR: 16 (03-11-18 @ 10:07) (16 - 20)  SpO2: 99% (03-11-18 @ 10:07) (96% - 100%)  Wt(kg): --  I&O's Summary    10 Mar 2018 06:01  -  11 Mar 2018 07:00  --------------------------------------------------------  IN: 1030 mL / OUT: 1400 mL / NET: -370 mL    Appearance: Normal	  HEENT:   Normal oral mucosa, PERRL, EOMI	  Lymphatic: No lymphadenopathy  Cardiovascular: Normal S1 S2, No JVD, No murmurs, No edema  Respiratory: Lungs clear to auscultation	  Psychiatry: A & O x 3, Mood & affect appropriate  Gastrointestinal:  Soft, Non-tender, + BS	  Skin: No rashes, No ecchymoses, No cyanosis	  Neurologic: Non-focal  Extremities: Normal range of motion, finger with small ulceration and discolored tip    LABS:	 	    CBC Full  -  ( 11 Mar 2018 06:12 )  WBC Count : 24.4 K/uL  Hemoglobin : 8.0 g/dL  Hematocrit : 24.7 %  Platelet Count - Automated : 398 K/uL    03-11    140  |  109<H>  |  24<H>  ----------------------------<  109<H>  5.1   |  21<L>  |  1.16      Ca    8.5      11 Mar 2018 06:12    TPro  6.2  /  Alb  3.0<L>  /  TBili  0.4  /  DBili  x   /  AST  24  /  ALT  25  /  AlkPhos  80  03-10    	  ASSESSMENT/PLAN: 	  This is a 67 year old man with ACC/AHA stage D chronic systolic heart failure due to NICM (LVEF 20%) s/p HM2 LVAD 6/17 c/b pump thrombus now 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 Underwent biopsy which showed CMR 1R, AMR 1 (no cellular injury). Repeat DSA negative. S/p drainage of distal digit by plastics with alleviation of pain. Crystals visualized in fluid. 2nd myocardial biopsy was 1R grade.    #Hyperkalemia  - Resolved, continue to monitor  - bactrim d/c'd, continue with Mepron   - low K diet    #Hypertension  - now on Diltiazem 120mg CD; SBPs now under 120. Would continue current dose     # Immunosuppression prophylaxis:  Tacrolimus - started on prograf 2/25; now on 8mg BID please check levels at 7:30 am (stat). F/u level this morning. Level 3/11 wnl.  Continue CellCept 1000 mg PO BID  Steroids - continue taper; prednisone changed to 12.5mg BID 3/8.   	  # Antimicrobial prophylaxis:  Intermediate risk CMV - on oral valganciclovir 450 mg q12  Intermediate risk Toxo - on Mepron  PCP - on Mepron    # Graft function:  LV function improved; persistent mild RV dysfunction however excellent HD off inotropes  underwent biospy 3/1; positive for grade 1R/AMR 1; DSA negative  Biopsy #2 results pending; by report, was unremarkable.  Last TTE with RV dysfunction but improved since previous    # Hypoxia:  saturating above 93% on room air;  c/w incentive spirometer    # Other prophylaxis:  Protonix 40 mg PO daily for GI prophylaxis while on steroids.    # Bilateral IJ/Subclavian thrombi  - HIT Ab positive; CHERIE negative; c/w lovenox  - appreciate vasc sx/card f/u  -per vascular, obtain repeat upper extremity bilateral venous duplex Monday.   - plastics/hand rheum to evaluation 3rd right digit for ischemia/?gout/infection. defer tx for acute gout per rheum. s/p drainage by plastics    # CAV PPx  - will start ASA when more stable  - statin prior to discharge    # ID - afebrile,  WBC declining slightly; serosanguinous drainage  - f/u cultures  - on cefepime for pseudomonas in sputum  - started on Vanc.   - Plastics and rheum following 3rd right digit possible infection vs crystaline arthropathy +/- ischemic digit  - f/u culture from finger debridement (3/9)  - WBC ct was up yesterday - CT scan showing small fluid collection inferior to the xiphoid process (likely the site of   prior drain), no obvious source of infection, UA neg      Cedrick Zavala 24H hour events: No acute events. Sitting upright in chair.  CT C/A/P showed no signs of active infection.    MEDICATIONS:  diltiazem    milliGRAM(s) Oral daily  enoxaparin Injectable 80 milliGRAM(s) SubCutaneous every 12 hours  atovaquone Suspension 1500 milliGRAM(s) Oral daily  cefepime  IVPB      cefepime  IVPB 2000 milliGRAM(s) IV Intermittent every 8 hours  nystatin    Suspension [id number] Unit(s) Oral every 6 hours  valGANciclovir 450 milliGRAM(s) Oral every 12 hours  vancomycin  IVPB 1000 milliGRAM(s) IV Intermittent every 12 hours  ALBUTerol/ipratropium for Nebulization 3 milliLiter(s) Nebulizer every 6 hours  acetaminophen   Tablet. 650 milliGRAM(s) Oral every 6 hours PRN  docusate sodium 100 milliGRAM(s) Oral three times a day  pantoprazole  Injectable 40 milliGRAM(s) IV Push two times a day  insulin lispro (HumaLOG) corrective regimen sliding scale   SubCutaneous three times a day before meals  insulin lispro (HumaLOG) corrective regimen sliding scale   SubCutaneous at bedtime  predniSONE   Tablet 12.5 milliGRAM(s) Oral <User Schedule>  mycophenolate mofetil 1000 milliGRAM(s) Oral <User Schedule>  tacrolimus 8 milliGRAM(s) Oral <User Schedule>      REVIEW OF SYSTEMS:  Complete 10point ROS negative except as documented above.    PHYSICAL EXAM:  T(C): 36.6 (03-11-18 @ 10:07), Max: 37 (03-11-18 @ 06:00)  HR: 105 (03-11-18 @ 10:07) (96 - 106)  BP: 113/62 (03-11-18 @ 10:07) (113/62 - 134/74)  RR: 16 (03-11-18 @ 10:07) (16 - 20)  SpO2: 99% (03-11-18 @ 10:07) (96% - 100%)  Wt(kg): --  I&O's Summary    10 Mar 2018 06:01  -  11 Mar 2018 07:00  --------------------------------------------------------  IN: 1030 mL / OUT: 1400 mL / NET: -370 mL    Appearance: Normal	  HEENT:   Normal oral mucosa, PERRL, EOMI	  Lymphatic: No lymphadenopathy  Cardiovascular: Normal S1 S2, No JVD, No murmurs, No edema  Respiratory: Lungs clear to auscultation	  Psychiatry: A & O x 3, Mood & affect appropriate  Gastrointestinal:  Soft, Non-tender, + BS	  Skin: No rashes, No ecchymoses, No cyanosis	  Neurologic: Non-focal  Extremities: Normal range of motion, finger with small ulceration and discolored tip    LABS:	 	    CBC Full  -  ( 11 Mar 2018 06:12 )  WBC Count : 24.4 K/uL  Hemoglobin : 8.0 g/dL  Hematocrit : 24.7 %  Platelet Count - Automated : 398 K/uL    03-11    140  |  109<H>  |  24<H>  ----------------------------<  109<H>  5.1   |  21<L>  |  1.16      Ca    8.5      11 Mar 2018 06:12    TPro  6.2  /  Alb  3.0<L>  /  TBili  0.4  /  DBili  x   /  AST  24  /  ALT  25  /  AlkPhos  80  03-10    	  ASSESSMENT/PLAN: 	  This is a 67 year old man with ACC/AHA stage D chronic systolic heart failure due to NICM (LVEF 20%) s/p HM2 LVAD 6/17 c/b pump thrombus now 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 Underwent biopsy which showed CMR 1R, AMR 1 (no cellular injury). Repeat DSA negative. S/p drainage of distal digit by plastics with alleviation of pain. Crystals visualized in fluid. 2nd myocardial biopsy was 1R grade.    #Hyperkalemia  - Resolved, continue to monitor  - bactrim d/c'd, continue with Mepron   - low K diet    #Hypertension  - now on Diltiazem 120mg CD; SBPs now under 120. Would continue current dose     # Immunosuppression prophylaxis:  Tacrolimus - started on prograf 2/25; now on 8mg BID please check levels at 7:30 am (stat). F/u level this morning. Level 3/11 wnl.  Continue CellCept 1000 mg PO BID  Steroids - continue taper; prednisone changed to 12.5mg BID 3/8.   	  # Antimicrobial prophylaxis:  Intermediate risk CMV - on oral valganciclovir 450 mg q12  Intermediate risk Toxo - on Mepron  PCP - on Mepron    # Graft function:  LV function improved; persistent mild RV dysfunction however excellent HD off inotropes  underwent biospy 3/1; positive for grade 1R/AMR 1; DSA negative  Biopsy #2 results 1R grade.    # Hypoxia:  saturating above 93% on room air;  c/w incentive spirometer    # Other prophylaxis:  Protonix 40 mg PO daily for GI prophylaxis while on steroids.    # Bilateral IJ/Subclavian thrombi  - HIT Ab positive; CHERIE negative; c/w lovenox  - appreciate vasc sx/card f/u  -per vascular, obtain repeat upper extremity bilateral venous duplex Monday.   - plastics/hand rheum to evaluation 3rd right digit for ischemia/?gout/infection. defer tx for acute gout per rheum. s/p drainage by plastics    # CAV PPx  - will start ASA when more stable  - statin prior to discharge    # ID - afebrile,  WBC declining slightly; serosanguinous drainage  - f/u cultures  - on cefepime for pseudomonas in sputum  - started on Vanc.   - Plastics and rheum following 3rd right digit possible infection vs crystaline arthropathy +/- ischemic digit  - f/u culture from finger debridement (3/9)  - WBC ct down today - CT scan showing small fluid collection inferior to the xiphoid process (likely the site of   prior drain), no obvious source of infection, UA neg

## 2018-03-11 NOTE — PROGRESS NOTE ADULT - PROBLEM SELECTOR PLAN 4
multiple clots  Lt Sc DVT, cephalic DVT, rt IJ innominate clot     awaiting PICC line eval   for abx and access

## 2018-03-11 NOTE — PROGRESS NOTE ADULT - SUBJECTIVE AND OBJECTIVE BOX
im ok    VITAL SIGNS    Telemetry:  nsr 90    Vital Signs Last 24 Hrs  T(C): 36.6 (18 @ 10:07), Max: 37 (18 @ 06:00)  T(F): 97.8 (18 @ 10:07), Max: 98.6 (18 @ 06:00)  HR: 105 (18 @ 10:07) (96 - 106)  BP: 113/62 (18 @ 10:07) (113/62 - 134/74)  RR: 16 (18 @ 10:07) (16 - 20)  SpO2: 99% (18 @ 10:07) (96% - 100%)                   03-10 @ 06:01  -   @ 07:00  --------------------------------------------------------  IN: 1030 mL / OUT: 1400 mL / NET: -370 mL     @ 07:01  -   @ 12:34  --------------------------------------------------------  IN: 0 mL / OUT: 200 mL / NET: -200 mL          Daily     Daily Weight in k.4 (11 Mar 2018 06:00)        CAPILLARY BLOOD GLUCOSE      POCT Blood Glucose.: 114 mg/dL (11 Mar 2018 11:29)  POCT Blood Glucose.: 112 mg/dL (11 Mar 2018 07:32)  POCT Blood Glucose.: 103 mg/dL (10 Mar 2018 22:09)  POCT Blood Glucose.: 88 mg/dL (10 Mar 2018 17:48)                  Coumadin    [ ] YES          [ x ]      NO                                      PHYSICAL EXAM        Neurology: alert and oriented x 3, nonfocal, no gross deficits  CV : S1 S2 , RRR   Sternal Wound :  CDI , Stable, lateral chest inc cdi  Lungs: cta  Drains:    vac to rlq         Abdomen: soft, nontender, nondistended, positive bowel sounds, last bowel movement +  :         voiding    Extremities:     trace edema - calve tenderness          acetaminophen   Tablet. 650 milliGRAM(s) Oral every 6 hours PRN  ALBUTerol/ipratropium for Nebulization 3 milliLiter(s) Nebulizer every 6 hours  atovaquone Suspension 1500 milliGRAM(s) Oral daily  cefepime  IVPB      cefepime  IVPB 2000 milliGRAM(s) IV Intermittent every 8 hours  diltiazem    milliGRAM(s) Oral daily  docusate sodium 100 milliGRAM(s) Oral three times a day  enoxaparin Injectable 80 milliGRAM(s) SubCutaneous every 12 hours  insulin lispro (HumaLOG) corrective regimen sliding scale   SubCutaneous three times a day before meals  insulin lispro (HumaLOG) corrective regimen sliding scale   SubCutaneous at bedtime  mycophenolate mofetil 1000 milliGRAM(s) Oral <User Schedule>  nystatin    Suspension 448097 Unit(s) Oral every 6 hours  pantoprazole  Injectable 40 milliGRAM(s) IV Push two times a day  predniSONE   Tablet 12.5 milliGRAM(s) Oral <User Schedule>  tacrolimus 8 milliGRAM(s) Oral <User Schedule>  valGANciclovir 450 milliGRAM(s) Oral every 12 hours  vancomycin  IVPB 1000 milliGRAM(s) IV Intermittent every 12 hours                    Physical Therapy Rec:   Home  [  ]   Home w/ PT  [  ]  Rehab  [  ]  Discussed with Cardiothoracic Team at AM rounds.

## 2018-03-12 LAB
ALBUMIN SERPL ELPH-MCNC: 3.2 G/DL — LOW (ref 3.3–5)
ALP SERPL-CCNC: 89 U/L — SIGNIFICANT CHANGE UP (ref 40–120)
ALT FLD-CCNC: 29 U/L RC — SIGNIFICANT CHANGE UP (ref 10–45)
ANION GAP SERPL CALC-SCNC: 12 MMOL/L — SIGNIFICANT CHANGE UP (ref 5–17)
ANION GAP SERPL CALC-SCNC: 13 MMOL/L — SIGNIFICANT CHANGE UP (ref 5–17)
AST SERPL-CCNC: 25 U/L — SIGNIFICANT CHANGE UP (ref 10–40)
BILIRUB SERPL-MCNC: 0.4 MG/DL — SIGNIFICANT CHANGE UP (ref 0.2–1.2)
BLD GP AB SCN SERPL QL: NEGATIVE — SIGNIFICANT CHANGE UP
BUN SERPL-MCNC: 24 MG/DL — HIGH (ref 7–23)
BUN SERPL-MCNC: 27 MG/DL — HIGH (ref 7–23)
CALCIUM SERPL-MCNC: 8.5 MG/DL — SIGNIFICANT CHANGE UP (ref 8.4–10.5)
CALCIUM SERPL-MCNC: 8.6 MG/DL — SIGNIFICANT CHANGE UP (ref 8.4–10.5)
CHLORIDE SERPL-SCNC: 106 MMOL/L — SIGNIFICANT CHANGE UP (ref 96–108)
CHLORIDE SERPL-SCNC: 108 MMOL/L — SIGNIFICANT CHANGE UP (ref 96–108)
CO2 SERPL-SCNC: 18 MMOL/L — LOW (ref 22–31)
CO2 SERPL-SCNC: 20 MMOL/L — LOW (ref 22–31)
CREAT SERPL-MCNC: 1.08 MG/DL — SIGNIFICANT CHANGE UP (ref 0.5–1.3)
CREAT SERPL-MCNC: 1.09 MG/DL — SIGNIFICANT CHANGE UP (ref 0.5–1.3)
GLUCOSE BLDC GLUCOMTR-MCNC: 118 MG/DL — HIGH (ref 70–99)
GLUCOSE BLDC GLUCOMTR-MCNC: 120 MG/DL — HIGH (ref 70–99)
GLUCOSE BLDC GLUCOMTR-MCNC: 84 MG/DL — SIGNIFICANT CHANGE UP (ref 70–99)
GLUCOSE BLDC GLUCOMTR-MCNC: 85 MG/DL — SIGNIFICANT CHANGE UP (ref 70–99)
GLUCOSE BLDC GLUCOMTR-MCNC: 98 MG/DL — SIGNIFICANT CHANGE UP (ref 70–99)
GLUCOSE SERPL-MCNC: 111 MG/DL — HIGH (ref 70–99)
GLUCOSE SERPL-MCNC: 115 MG/DL — HIGH (ref 70–99)
HCT VFR BLD CALC: 23.5 % — LOW (ref 39–50)
HCT VFR BLD CALC: 27.6 % — LOW (ref 39–50)
HGB BLD-MCNC: 7.7 G/DL — LOW (ref 13–17)
HGB BLD-MCNC: 9.2 G/DL — LOW (ref 13–17)
MAGNESIUM SERPL-MCNC: 1.6 MG/DL — SIGNIFICANT CHANGE UP (ref 1.6–2.6)
MCHC RBC-ENTMCNC: 30.8 PG — SIGNIFICANT CHANGE UP (ref 27–34)
MCHC RBC-ENTMCNC: 30.8 PG — SIGNIFICANT CHANGE UP (ref 27–34)
MCHC RBC-ENTMCNC: 32.6 GM/DL — SIGNIFICANT CHANGE UP (ref 32–36)
MCHC RBC-ENTMCNC: 33.4 GM/DL — SIGNIFICANT CHANGE UP (ref 32–36)
MCV RBC AUTO: 92.2 FL — SIGNIFICANT CHANGE UP (ref 80–100)
MCV RBC AUTO: 94.6 FL — SIGNIFICANT CHANGE UP (ref 80–100)
PLATELET # BLD AUTO: 375 K/UL — SIGNIFICANT CHANGE UP (ref 150–400)
PLATELET # BLD AUTO: 396 K/UL — SIGNIFICANT CHANGE UP (ref 150–400)
POTASSIUM SERPL-MCNC: 4.9 MMOL/L — SIGNIFICANT CHANGE UP (ref 3.5–5.3)
POTASSIUM SERPL-MCNC: 4.9 MMOL/L — SIGNIFICANT CHANGE UP (ref 3.5–5.3)
POTASSIUM SERPL-SCNC: 4.9 MMOL/L — SIGNIFICANT CHANGE UP (ref 3.5–5.3)
POTASSIUM SERPL-SCNC: 4.9 MMOL/L — SIGNIFICANT CHANGE UP (ref 3.5–5.3)
PROT SERPL-MCNC: 6.6 G/DL — SIGNIFICANT CHANGE UP (ref 6–8.3)
RBC # BLD: 2.48 M/UL — LOW (ref 4.2–5.8)
RBC # BLD: 2.99 M/UL — LOW (ref 4.2–5.8)
RBC # FLD: 16.7 % — HIGH (ref 10.3–14.5)
RBC # FLD: 17.3 % — HIGH (ref 10.3–14.5)
RH IG SCN BLD-IMP: POSITIVE — SIGNIFICANT CHANGE UP
SODIUM SERPL-SCNC: 137 MMOL/L — SIGNIFICANT CHANGE UP (ref 135–145)
SODIUM SERPL-SCNC: 140 MMOL/L — SIGNIFICANT CHANGE UP (ref 135–145)
SURGICAL PATHOLOGY STUDY: SIGNIFICANT CHANGE UP
TACROLIMUS SERPL-MCNC: 11.5 NG/ML — SIGNIFICANT CHANGE UP
VANCOMYCIN TROUGH SERPL-MCNC: 13 UG/ML — SIGNIFICANT CHANGE UP (ref 10–20)
WBC # BLD: 25.8 K/UL — HIGH (ref 3.8–10.5)
WBC # BLD: 28.6 K/UL — HIGH (ref 3.8–10.5)
WBC # FLD AUTO: 25.8 K/UL — HIGH (ref 3.8–10.5)
WBC # FLD AUTO: 28.6 K/UL — HIGH (ref 3.8–10.5)

## 2018-03-12 PROCEDURE — 93970 EXTREMITY STUDY: CPT | Mod: 26

## 2018-03-12 PROCEDURE — 71045 X-RAY EXAM CHEST 1 VIEW: CPT | Mod: 26,77

## 2018-03-12 PROCEDURE — 99233 SBSQ HOSP IP/OBS HIGH 50: CPT

## 2018-03-12 PROCEDURE — 99232 SBSQ HOSP IP/OBS MODERATE 35: CPT

## 2018-03-12 PROCEDURE — 71045 X-RAY EXAM CHEST 1 VIEW: CPT | Mod: 26

## 2018-03-12 RX ORDER — ATORVASTATIN CALCIUM 80 MG/1
10 TABLET, FILM COATED ORAL AT BEDTIME
Qty: 0 | Refills: 0 | Status: DISCONTINUED | OUTPATIENT
Start: 2018-03-12 | End: 2018-03-13

## 2018-03-12 RX ORDER — PANTOPRAZOLE SODIUM 20 MG/1
40 TABLET, DELAYED RELEASE ORAL
Qty: 0 | Refills: 0 | Status: DISCONTINUED | OUTPATIENT
Start: 2018-03-12 | End: 2018-03-20

## 2018-03-12 RX ORDER — ASPIRIN/CALCIUM CARB/MAGNESIUM 324 MG
81 TABLET ORAL DAILY
Qty: 0 | Refills: 0 | Status: DISCONTINUED | OUTPATIENT
Start: 2018-03-12 | End: 2018-04-10

## 2018-03-12 RX ADMIN — Medication 3 MILLILITER(S): at 00:21

## 2018-03-12 RX ADMIN — ATOVAQUONE 1500 MILLIGRAM(S): 750 SUSPENSION ORAL at 12:12

## 2018-03-12 RX ADMIN — VALGANCICLOVIR 450 MILLIGRAM(S): 450 TABLET, FILM COATED ORAL at 08:19

## 2018-03-12 RX ADMIN — Medication 120 MILLIGRAM(S): at 05:44

## 2018-03-12 RX ADMIN — Medication 500000 UNIT(S): at 17:36

## 2018-03-12 RX ADMIN — Medication 500000 UNIT(S): at 00:21

## 2018-03-12 RX ADMIN — PANTOPRAZOLE SODIUM 40 MILLIGRAM(S): 20 TABLET, DELAYED RELEASE ORAL at 17:36

## 2018-03-12 RX ADMIN — ATORVASTATIN CALCIUM 10 MILLIGRAM(S): 80 TABLET, FILM COATED ORAL at 22:29

## 2018-03-12 RX ADMIN — ENOXAPARIN SODIUM 80 MILLIGRAM(S): 100 INJECTION SUBCUTANEOUS at 17:36

## 2018-03-12 RX ADMIN — Medication 12.5 MILLIGRAM(S): at 08:20

## 2018-03-12 RX ADMIN — Medication 3 MILLILITER(S): at 05:44

## 2018-03-12 RX ADMIN — Medication 81 MILLIGRAM(S): at 17:37

## 2018-03-12 RX ADMIN — Medication 500000 UNIT(S): at 12:12

## 2018-03-12 RX ADMIN — ENOXAPARIN SODIUM 80 MILLIGRAM(S): 100 INJECTION SUBCUTANEOUS at 05:27

## 2018-03-12 RX ADMIN — Medication 250 MILLIGRAM(S): at 06:33

## 2018-03-12 RX ADMIN — CEFEPIME 100 MILLIGRAM(S): 1 INJECTION, POWDER, FOR SOLUTION INTRAMUSCULAR; INTRAVENOUS at 05:26

## 2018-03-12 RX ADMIN — Medication 1 APPLICATION(S): at 17:50

## 2018-03-12 RX ADMIN — TACROLIMUS 8 MILLIGRAM(S): 5 CAPSULE ORAL at 08:20

## 2018-03-12 RX ADMIN — MYCOPHENOLATE MOFETIL 1000 MILLIGRAM(S): 250 CAPSULE ORAL at 20:02

## 2018-03-12 RX ADMIN — MYCOPHENOLATE MOFETIL 1000 MILLIGRAM(S): 250 CAPSULE ORAL at 08:19

## 2018-03-12 RX ADMIN — TACROLIMUS 8 MILLIGRAM(S): 5 CAPSULE ORAL at 20:03

## 2018-03-12 RX ADMIN — Medication 3 MILLILITER(S): at 12:24

## 2018-03-12 RX ADMIN — Medication 500000 UNIT(S): at 05:44

## 2018-03-12 RX ADMIN — Medication 250 MILLIGRAM(S): at 17:36

## 2018-03-12 RX ADMIN — Medication 12.5 MILLIGRAM(S): at 20:02

## 2018-03-12 RX ADMIN — VALGANCICLOVIR 450 MILLIGRAM(S): 450 TABLET, FILM COATED ORAL at 20:03

## 2018-03-12 RX ADMIN — PANTOPRAZOLE SODIUM 40 MILLIGRAM(S): 20 TABLET, DELAYED RELEASE ORAL at 05:26

## 2018-03-12 RX ADMIN — CEFEPIME 100 MILLIGRAM(S): 1 INJECTION, POWDER, FOR SOLUTION INTRAMUSCULAR; INTRAVENOUS at 22:10

## 2018-03-12 RX ADMIN — Medication 3 MILLILITER(S): at 17:36

## 2018-03-12 RX ADMIN — CEFEPIME 100 MILLIGRAM(S): 1 INJECTION, POWDER, FOR SOLUTION INTRAMUSCULAR; INTRAVENOUS at 14:56

## 2018-03-12 RX ADMIN — Medication 100 MILLIGRAM(S): at 14:56

## 2018-03-12 RX ADMIN — Medication 1 TABLET(S): at 17:37

## 2018-03-12 NOTE — PROGRESS NOTE ADULT - ATTENDING COMMENTS
Reviewed transplant education; required assistance with remembering names of medication.  Reports he sometimes gets "nervous" when asked to recall the information.  Discussed strategies to help him manage stress.  Team has been working closely with him and utilizing various strategies to help with education process.  Marga Martines, PhD  [phone]

## 2018-03-12 NOTE — PROGRESS NOTE ADULT - SUBJECTIVE AND OBJECTIVE BOX
Learner: Patient  Barriers: inability to read    Patient received a cardiac transplant.     Method used: Verbal discussion and written material - include the drug cards with photos to reinforce learning and patient's knowledge of medications.     Medication safety was discussed with the patient: allergies, herbals, adherence, interactions, medication precautions, miss dose instructions, purpose, side effects, signs and symptoms to report, and storage & handling.    Patient was able to repeat and verbalize key points. The patient is able to identify the medication based on the way it looks (rejection medications and prophylaxis - tacrolimus, mycophenolate, prednisone, valganciclovir) in addition to enoxaparin (the patient understands to use it twice a day and the purpose of medication).      Education Summary: Discharge immunosuppressant medications and prophylatic anti-infective agents reviewed with the patient. Outpatient medication schedule was discussed in detail including: medication name, indication, dose, administration times, treatment duration, side effects, drug interactions, and special instructions. Patient questions and concerns were answered and addressed. Patient demonstrated understanding. The education is ongoing - will reinforce the information once or twice a day while up until the discharge.     Time spent discharge education: 120 minutes    MEDICATIONS  (STANDING):  ALBUTerol/ipratropium for Nebulization 3 milliLiter(s) Nebulizer every 6 hours  aspirin enteric coated 81 milliGRAM(s) Oral daily  atorvastatin 10 milliGRAM(s) Oral at bedtime  atovaquone Suspension 1500 milliGRAM(s) Oral daily  Calcium Citrate + Vit D, 315 mg/250 Unit 2 Tablet(s) 2 Tablet(s) Oral two times a day  cefepime  IVPB      cefepime  IVPB 2000 milliGRAM(s) IV Intermittent every 8 hours  diltiazem    milliGRAM(s) Oral daily  docusate sodium 100 milliGRAM(s) Oral three times a day  enoxaparin Injectable 80 milliGRAM(s) SubCutaneous every 12 hours  insulin lispro (HumaLOG) corrective regimen sliding scale   SubCutaneous three times a day before meals  insulin lispro (HumaLOG) corrective regimen sliding scale   SubCutaneous at bedtime  multivitamin 1 Tablet(s) Oral daily  mycophenolate mofetil 1000 milliGRAM(s) Oral <User Schedule>  nystatin    Suspension [id number] Unit(s) Oral every 6 hours  pantoprazole    Tablet 40 milliGRAM(s) Oral before breakfast  predniSONE   Tablet 12.5 milliGRAM(s) Oral <User Schedule>  silver sulfADIAZINE 1% Cream 1 Application(s) Topical two times a day  tacrolimus 8 milliGRAM(s) Oral <User Schedule>  valGANciclovir 450 milliGRAM(s) Oral every 12 hours  vancomycin  IVPB 1000 milliGRAM(s) IV Intermittent every 12 hours    MEDICATIONS  (PRN):  acetaminophen   Tablet. 650 milliGRAM(s) Oral every 6 hours PRN Mild Pain (1 - 3)      Cardiac Transplant Medications:  Tacrolimus adjusted to trough (current dose is 8 mg PO twice a day)  Mycophenolate mofetil 1,000 mg PO twice a day  Prednisone taper   Sulfamethoxazole/Trimethoprim 1SS daily was replaced with atovaquone 1,500 mg PO daily (hyperkalemia)  Nystatin swish and swallow four times a day  Valganciclovir 450 mg PO twice a day  Docusate and Senna  Pantoprazole 40 mg daily                          7.7    25.8  )-----------( 375      ( 12 Mar 2018 05:35 )             23.5     03-12    140  |  108  |  24<H>  ----------------------------<  111<H>  4.9   |  20<L>  |  1.08    Ca    8.5      12 Mar 2018 05:35  Mg     1.6     03-12        Tacrolimus (), Serum: 11.5 ng/mL (03-12-18 @ 09:03)  Vancomycin Level, Trough: 15.7 ug/mL (03-11-18 @ 17:59)  Tacrolimus (), Serum: 14.2 ng/mL (03-11-18 @ 08:06)  Tacrolimus (), Serum: 11.0 ng/mL (03-10-18 @ 07:51)  Tacrolimus (), Serum: 12.3 ng/mL (03-09-18 @ 21:35)

## 2018-03-12 NOTE — PROGRESS NOTE ADULT - ASSESSMENT
Reports good mood and doing well overall.  Sleeping well, reportedly waking 3 times per night to urinate which is usual for him.  Appetite good, eating 3 meals per day.  Has been walking 0.5-1 lap per day with SOB; no other complaints.  Reports having pain "sometimes" in the finger that was recently drained; indicates that his finger is swollen and that he has gout, which is a pre-existing condition.  Denied new stressors.        DX:  Systolic heart failure; r/o Adjustment disorder     Recommendations:   Behavioral Cardiology will continue to follow as needed    Rosio Dacosta M.A.

## 2018-03-12 NOTE — PROGRESS NOTE ADULT - ASSESSMENT
67 yr old male s/p  Heart transplant 2/23/18 for HF   H; GI bleed, LVAD implant, afib, anemia AICD  Intra op acute rejection of graft and Intraop IABP placed  and removed POD 1 and received plasmaphoresis x 2  2/26 UE +RT IJ inominate SC,cephalic DVT and +lt SC DVT  3/1  cardiac bx low grade rejection  3/2 bronchoscopy   neg   3/4  trf too floor,  Imipenum for low grade fevers  3/5 Sputum + PSA, ID to see pt  Vac changed to RLQ  3/6 Pt on cefepime  Remains on lovenox, planning on biopsy thursday, will need to hold   3/8 Cardiac biopsy this am - prograf level - 7- prograf level increased to 8mg po bid  toprol d/c'd,  Cardizem CD 120mg po initiated  hi filling pressures noted on right heart cath-lasix 40mg po daily started as per HF  K+= 6 - Bactrim d/c'd - Mepron to start in am for prophylaxis  rpt k+ = 5.2-  daily prograf levels, daily CXR, bid CMP & CBC  plastics consult for ?necrotic right 3rd digit  ** if SBP remains >120-130 will increase CardizemCD to 180mg po daily tomorrow 3/9  resume lvx 80mg sq bid tonight  Cardiac biopsy results pending- depending on biopsy results - HF to adjust prednisone taper  d/c planning  3/9 prednisone 12.5 bid.  Lasix x 1 in am tomorrow, then d/c.   Prograf 10.4, level increased from 7.9 so will need a prograf level tonight at 7 pm, Dr. Mccabe to be notified.  K elevated , if continues to rise may need  kaexalate  Finger with  possible necrosis 3rd R.  Tophus noted by rheumatology,+ crystals  specimen sent.  Hand /plastics consulted, finger  drained, may need further intervention  3/10  WBC to 29   afebrile,  ct abdomen  chest pelvis ordered,   Prograf level 11,  vss   3/11 wbc trending down.  CT chest abd pelvis :  < from: CT Chest No Cont (03.10.18 @ 15:47) >  IMPRESSION:     Small fluid collection inferior to the xiphoid process likely the site of   prior drain.    7 mm nodule in the lingula, new from prior imaging.    No evidence of infection in the abdomen or pelvis.    < end of copied text >  Hct down , if  decreases further on repeat labs , PRBC to be given  Finger  with less discomfort, no drainage today unable to culture.  Minimal drainage after it was drained on  3/9, culture not sent during   BC 3/9 neg to date  UA neg  Driveline site  cx negative from 3/5  WBC trending down  Tacro leve 14.2, D/W hf , MAINTAIN CURRENT DOSAGE  Cont IV abx  Cont lovenox for dvt  3/12 Pt with decreased hct, 1uprbc being given.  Plastics f/u , would use silvadine  on finger, no further intervention at this time

## 2018-03-12 NOTE — PROGRESS NOTE ADULT - ASSESSMENT
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 Patient on broad federica-operative prophylaxis to Vancomycin/Meropenem/Fluconazole based upon his prior LVAD placement and prolonged hospitalization pre-transplant. Intermediate risk for CMV (seropositive); intermediate risk for toxoplasmosis (seropositive R). Improved aeration today at right lung base. He had Significant amount of sero-sanguinous drainage from old LVAD exit site but cultures are NGTD    - Plan:  Continue OI prophylaxis with Valganciclovir, Nystatin, Bactrim changed to Mepron by transplant team for PCP prophylaxis because of elevated potassium.    Receiving Cefepime 2gm q8hr for RLL atelectasis/?pneumonia with CR pseudomonas in sputum   Placed on IV Vancomycin 750mg q 12hr given increasing WBC and drainage from old LVAD site Vanco trough= 8.6 would increase dose to 1gram q 12hr.  check Vanco trough p[rior to next dose  clinically looks well without signs or symptoms of pna     his finger is of concern but the site does look more like crystal disease than infection  to continue present antibiotics      for ct scan: elevated WBC could also be related to thrombosis On AC 66 yo man s/p prior LVAD placement, no infections related to the LVAD prior to undergoing an orthotopic heart transplant 2/23 from a reportedly high risk donor HCV. Patient on broad federica-operative prophylaxis to Vancomycin/Meropenem/Fluconazole based upon his prior LVAD placement and prolonged hospitalization pre-transplant. Intermediate risk for CMV (seropositive); intermediate risk for toxoplasmosis (seropositive R). Improved aeration today at right lung base. He had Significant amount of sero-sanguinous drainage from old LVAD exit site but cultures are NGTD    - Plan:  Continue OI prophylaxis with Valganciclovir, Nystatin, Bactrim changed to Mepron by transplant team for PCP prophylaxis because of elevated potassium.    Receiving Cefepime 2gm q8hr for RLL atelectasis/?pneumonia with CR pseudomonas in sputum   Placed on IV Vancomycin 750mg q 12hr given increasing WBC and drainage from old LVAD site Vanco trough= 8.6 would increase dose to 1gram q 12hr.  check Vanco trough p[rior to next dose  clinically looks well without signs or symptoms of pna     his finger is of concern but the site does look more like crystal disease than infection  to continue present antibiotics  send another HCV viral load PCR test      for ct scan: elevated WBC could also be related to thrombosis On AC

## 2018-03-12 NOTE — PROGRESS NOTE ADULT - ASSESSMENT
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 Underwent biopsy #1 which showed CMR 1R, AMR 1 (no cellular injury). Repeat DSA negative. Plan for next EMB on Wednesday 3/14. Currently appears euvolemic.     #Hyperkalemia  - Now improved with K 4.9 from 4.8 yesterday.   - Changed bactrim to Mepron.  - low K diet    #Hypertension  - Continue Diltiazem 120mg CD; SBPs now 110s-120s.    # Immunosuppression prophylaxis:  Tacrolimus - started on prograf 2/25; now on 8mg BID please check levels at 7:30 am (stat). Level today 11.5 with goal 12-14. Will continue current dose at this time.  Continue CellCept 1000 mg PO BID  Steroids - continue taper; prednisone changed to 12.5mg BID.  Magnesium added to today's labs. If low may need mag oxide 400mg PO TID  	  # Antimicrobial prophylaxis:  Intermediate risk CMV - on oral valganciclovir 450 mg q12  Intermediate risk Toxo - on Mepron  PCP - on Mepron    # Graft function:  Plan for EMB Wed 3/14.  Last TTE with RV systolic dysfunction but improved since previous    # Other prophylaxis:  Please start the following:  - citracal + vitamin D 2 tabs PO BID  - multivitamin daily  - Please change protonix to 40mg PO daily    # Bilateral IJ/Subclavian thrombi  - HIT Ab positive; CHERIE negative; c/w lovenox 80mg subcut Q12, per vasc cards will need AC for at least 3 months  - appreciate vasc sx/card f/u  - plan for repeat BUE venous dopplers today to reevaluate    # CAV PPx  - enteric coated ASA 81mg PO daily  - pravastatin 20mg QHS    # ID - afebrile,  WBC declining slightly; serosanguinous drainage  - f/u cultures  - on cefepime for pseudomonas in sputum  - started on Vanc for drainage from old vad drive line site  - f/u ID recs. 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 Underwent biopsy #1 which showed CMR 1R, AMR 1 (no cellular injury). Repeat DSA negative. Plan for next EMB on Wednesday 3/14. Currently appears euvolemic.     #Hyperkalemia  - Now improved with K 4.9 from 4.8 yesterday.   - Changed bactrim to Mepron.  - low K diet    #Hypertension  - Continue Diltiazem 120mg CD; SBPs now 110s-120s.    # Immunosuppression prophylaxis:  Tacrolimus - started on prograf 2/25; now on 8mg BID please check levels at 7:30 am (stat). Level today 11.5 with goal 12-14. Will continue current dose at this time.  Continue CellCept 1000 mg PO BID  Steroids - continue prednisone 12.5mg PO BID until next biopsy.  Magnesium added to today's labs. If low may need mag oxide 400mg PO TID  	  # Antimicrobial prophylaxis:  Intermediate risk CMV - on oral valganciclovir 450 mg q12  Intermediate risk Toxo - on Mepron  PCP - on Mepron    # Graft function:  Plan for EMB Wed 3/14.  Last TTE with RV systolic dysfunction but improved since previous    # Other prophylaxis:  Please start the following:  - citracal + vitamin D 2 tabs PO BID  - multivitamin daily  - Please change protonix to 40mg PO daily    # Bilateral IJ/Subclavian thrombi  - HIT Ab positive; CHERIE negative; c/w lovenox 80mg subcut Q12, per vasc cards will need AC for at least 3 months  - appreciate vasc sx/card f/u  - plan for repeat BUE venous dopplers today to reevaluate    # CAV PPx  - please add enteric coated ASA 81mg PO daily  - please add pravastatin 20mg QHS    # ID - afebrile,  WBC declining slightly; serosanguinous drainage  - f/u cultures  - on cefepime for pseudomonas in sputum  - started on Vanc for drainage from old vad drive line site  - f/u ID recs.

## 2018-03-12 NOTE — PROGRESS NOTE ADULT - SUBJECTIVE AND OBJECTIVE BOX
PAGER:  [phone]               [facility] [id number]              EMAIL [email or web address]   OFFICE [phone]                              ********VASCULAR MEDICINE PROGRESS NOTE********                        CC:  UE DVT    INTERVAL HISTORY:    No issues.  Arms feel fine. RUE ACE wrap in place. Continues with lovenox     MEDICATIONS  (STANDING):  ALBUTerol/ipratropium for Nebulization 3 milliLiter(s) Nebulizer every 6 hours  atovaquone Suspension 1500 milliGRAM(s) Oral daily  cefepime  IVPB      cefepime  IVPB 2000 milliGRAM(s) IV Intermittent every 8 hours  diltiazem    milliGRAM(s) Oral daily  docusate sodium 100 milliGRAM(s) Oral three times a day  enoxaparin Injectable 80 milliGRAM(s) SubCutaneous every 12 hours  insulin lispro (HumaLOG) corrective regimen sliding scale   SubCutaneous three times a day before meals  insulin lispro (HumaLOG) corrective regimen sliding scale   SubCutaneous at bedtime  mycophenolate mofetil 1000 milliGRAM(s) Oral <User Schedule>  nystatin    Suspension 574104 Unit(s) Oral every 6 hours  pantoprazole  Injectable 40 milliGRAM(s) IV Push two times a day  predniSONE   Tablet 12.5 milliGRAM(s) Oral <User Schedule>  tacrolimus 8 milliGRAM(s) Oral <User Schedule>  valGANciclovir 450 milliGRAM(s) Oral every 12 hours  vancomycin  IVPB 1000 milliGRAM(s) IV Intermittent every 12 hours    MEDICATIONS  (PRN):  acetaminophen   Tablet. 650 milliGRAM(s) Oral every 6 hours PRN Mild Pain (1 - 3)    PAST MEDICAL & SURGICAL HISTORY:  GIB (gastrointestinal bleeding)  Ventricular fibrillation: s/p AICD  PAF (paroxysmal atrial fibrillation): on xarelto  Non-Ischemic Cardiomyopathy  SVT (Supraventricular Tachycardia)  HTN  CHF (Congestive Heart Failure)  LVAD (left ventricular assist device) present  Status post left hip replacement  History of Prior Ablation Treatment: for afib  AICD (Automatic Cardioverter/Defibrillator) Present: [employer] with 1 [employer] lead4/1/09- explanted and replaced with [employer] 2 leads on 9/2/09      FAMILY HISTORY:  No pertinent family history in first degree relatives      SOCIAL HISTORY:  unchanged    REVIEW OF SYSTEMS:  CONSTITUTIONAL: No fever, weight loss, or fatigue  EYES: No eye pain, visual disturbances, or discharge  ENMT:  No difficulty hearing, tinnitus, vertigo; No sinus or throat pain  NECK: No pain or stiffness  RESPIRATORY: No cough, wheezing, chills or hemoptysis; No Shortness of Breath  CARDIOVASCULAR: No chest pain, palpitations, passing out, dizziness, or leg swelling  GASTROINTESTINAL: No abdominal or epigastric pain. No nausea, vomiting, or hematemesis; No diarrhea or constipation. No melena or hematochezia.  GENITOURINARY: No dysuria, frequency, hematuria, or incontinence  NEUROLOGICAL: No headaches, memory loss, loss of strength, numbness, or tremors  SKIN: No itching, burning, rashes, or lesions   LYMPH Nodes: No enlarged glands  ENDOCRINE: No heat or cold intolerance; No hair loss  MUSCULOSKELETAL: No joint pain or swelling; No muscle, back, or extremity pain  PSYCHIATRIC: No depression, anxiety, mood swings, or difficulty sleeping  HEME/LYMPH: No easy bruising, or bleeding gums  ALLERY AND IMMUNOLOGIC: No hives or eczema	    [x ] All others negative	  [ ] Unable to obtain    Vital Signs Last 24 Hrs  T(C): 36.5 (03-12-18 @ 05:07), Max: 37.1 (03-11-18 @ 19:29)  T(F): 97.7 (03-12-18 @ 05:07), Max: 98.7 (03-11-18 @ 19:29)  HR: 92 (03-12-18 @ 05:07) (92 - 105)  BP: 124/82 (03-12-18 @ 05:07) (110/69 - 124/82)  BP(mean): 96 (03-12-18 @ 05:07) (85 - 96)  RR: 15 (03-12-18 @ 05:07) (15 - 17)  SpO2: 98% (03-12-18 @ 05:07) (97% - 99%)    Appearance: Normal, on high flow oxgyen  HEENT:   Normal oral mucosa, PERRL, EOMI	  Lymphatic: No lymphadenopathy  Cardiovascular: Normal S1 S2 tachycardia.   Respiratory: clear	  Psychiatry: A & O x 3, Mood & affect appropriate  Gastrointestinal:  Soft, Non-tender, + BS	  Skin: No rashes, No ecchymoses, No cyanosis	  Neurologic: Non-focal  Extremities: RUE edema.  3rd digit R hand ischemia, motion and sensation intact.                                 7.7    25.8  )-----------( 375      ( 12 Mar 2018 05:35 )             23.5   03-12    140  |  108  |  24<H>  ----------------------------<  111<H>  4.9   |  20<L>  |  1.08    Ca    8.5      12 Mar 2018 05:35

## 2018-03-12 NOTE — PROGRESS NOTE ADULT - SUBJECTIVE AND OBJECTIVE BOX
Behavioral Cardiology Progress Note     HPI:  Mr. 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 Was found to have b/l IJ/subclavian thrombi. Extubated 2/27.     Behavioral Health Assessment:   Current stressors:   Hospitalization   s/p Heart transplant (2/23/18)      Support system/family support: Good support from family and close friend      Coping strategies: Talking with family and staff, watching TV, his pastora, talking to his granddaughter     Understanding of medical illness and treatment plan:  Understands reasons for this admission and treatment plan.  Good understanding of heart transplant education including need for lifelong immunosuppressant medication.  Benefits from frequent review and teach-back of all education.     MSE:  Patient seen lying in bed.  Awake and responsive, oriented x3. Well related but mostly kept eyes closed. Thought process goal directed. No abnormal thought content.  Mood "good".  Affect somewhat constricted.  Insight and judgment adequate.

## 2018-03-12 NOTE — PROGRESS NOTE ADULT - SUBJECTIVE AND OBJECTIVE BOX
Patient re-evaluated  Left middle finger pain and swelling improved  Area of ischemia demarcating more distal than on initial exam  No gross signs of infection    Recc local wound care with silvadene twice daily to open areas  No plan for acute intervention

## 2018-03-12 NOTE — PROGRESS NOTE ADULT - SUBJECTIVE AND OBJECTIVE BOX
VITAL SIGNS    Telemetry:      Vital Signs Last 24 Hrs  T(C): 35.8 (18 @ 11:05), Max: 37.1 (18 @ 19:29)  T(F): 96.5 (18 @ 11:05), Max: 98.7 (18 @ 19:29)  HR: 112 (18 @ 11:05) (92 - 112)  BP: 139/88 (18 @ 11:05) (110/69 - 139/88)  RR: 15 (18 @ 05:07) (15 - 17)  SpO2: 100% (18 @ 11:05) (97% - 100%)                    @ 07:01  -   @ 07:00  --------------------------------------------------------  IN: 500 mL / OUT: 900 mL / NET: -400 mL     @ 07:01  -   @ 12:55  --------------------------------------------------------  IN: 1220 mL / OUT: 450 mL / NET: 770 mL          Daily     Daily Weight in k.8 (12 Mar 2018 05:07)        CAPILLARY BLOOD GLUCOSE      POCT Blood Glucose.: 98 mg/dL (12 Mar 2018 11:49)  POCT Blood Glucose.: 120 mg/dL (12 Mar 2018 07:40)  POCT Blood Glucose.: 122 mg/dL (11 Mar 2018 22:32)  POCT Blood Glucose.: 111 mg/dL (11 Mar 2018 17:32)                  Coumadin    [ ] YES          [  ]      NO         Reason:                               PHYSICAL EXAM        Neurology: alert and oriented x 3, nonfocal, no gross deficits  CV :  Sternal Wound :  CDI , Stable  Pacing Wires        [  ]   Settings:                                  Isolated  [  ]  Lungs:  Drains:     MS         [  ] Drainage:                 L Pleural  [  ]  Drainage:                R Pleural  [  ]  Drainage:  Abdomen: soft, nontender, nondistended, positive bowel sounds, last bowel movement   :             Extremities:               acetaminophen   Tablet. 650 milliGRAM(s) Oral every 6 hours PRN  ALBUTerol/ipratropium for Nebulization 3 milliLiter(s) Nebulizer every 6 hours  atovaquone Suspension 1500 milliGRAM(s) Oral daily  cefepime  IVPB      cefepime  IVPB 2000 milliGRAM(s) IV Intermittent every 8 hours  diltiazem    milliGRAM(s) Oral daily  docusate sodium 100 milliGRAM(s) Oral three times a day  enoxaparin Injectable 80 milliGRAM(s) SubCutaneous every 12 hours  insulin lispro (HumaLOG) corrective regimen sliding scale   SubCutaneous three times a day before meals  insulin lispro (HumaLOG) corrective regimen sliding scale   SubCutaneous at bedtime  mycophenolate mofetil 1000 milliGRAM(s) Oral <User Schedule>  nystatin    Suspension 848130 Unit(s) Oral every 6 hours  pantoprazole  Injectable 40 milliGRAM(s) IV Push two times a day  predniSONE   Tablet 12.5 milliGRAM(s) Oral <User Schedule>  tacrolimus 8 milliGRAM(s) Oral <User Schedule>  valGANciclovir 450 milliGRAM(s) Oral every 12 hours  vancomycin  IVPB 1000 milliGRAM(s) IV Intermittent every 12 hours                    Physical Therapy Rec:   Home  [  ]   Home w/ PT  [  ]  Rehab  [  ]  Discussed with Cardiothoracic Team at AM rounds. im ok    VITAL SIGNS    Telemetry:  nsr 90    Vital Signs Last 24 Hrs  T(C): 35.8 (18 @ 11:05), Max: 37.1 (18 @ 19:29)  T(F): 96.5 (18 @ 11:05), Max: 98.7 (18 @ 19:29)  HR: 112 (18 @ 11:05) (92 - 112)  BP: 139/88 (18 @ 11:05) (110/69 - 139/88)  RR: 15 (18 @ 05:07) (15 - 17)  SpO2: 100% (18 @ 11:05) (97% - 100%)                    @ 07:01  -   @ 07:00  --------------------------------------------------------  IN: 500 mL / OUT: 900 mL / NET: -400 mL     @ 07:01  -   @ 12:55  --------------------------------------------------------  IN: 1220 mL / OUT: 450 mL / NET: 770 mL          Daily     Daily Weight in k.8 (12 Mar 2018 05:07)        CAPILLARY BLOOD GLUCOSE      POCT Blood Glucose.: 98 mg/dL (12 Mar 2018 11:49)  POCT Blood Glucose.: 120 mg/dL (12 Mar 2018 07:40)  POCT Blood Glucose.: 122 mg/dL (11 Mar 2018 22:32)  POCT Blood Glucose.: 111 mg/dL (11 Mar 2018 17:32)                  Coumadin    [ ] YES          [  ]      NO         Reason:                               PHYSICAL EXAM        Neurology: alert and oriented x 3, nonfocal, no gross deficits  CV : S1 S2 , RRR   Sternal Wound :  CDI , Stable  Lungs:  decreased RLL  Abdomen: soft, nontender, nondistended, positive bowel sounds, last bowel movement +  :       voiding  Extremities:    Trace edema - calve tenderness  R hand 3rd finger  scab and tophi noted.  Less tender, no drainage.           acetaminophen   Tablet. 650 milliGRAM(s) Oral every 6 hours PRN  ALBUTerol/ipratropium for Nebulization 3 milliLiter(s) Nebulizer every 6 hours  atovaquone Suspension 1500 milliGRAM(s) Oral daily  cefepime  IVPB      cefepime  IVPB 2000 milliGRAM(s) IV Intermittent every 8 hours  diltiazem    milliGRAM(s) Oral daily  docusate sodium 100 milliGRAM(s) Oral three times a day  enoxaparin Injectable 80 milliGRAM(s) SubCutaneous every 12 hours  insulin lispro (HumaLOG) corrective regimen sliding scale   SubCutaneous three times a day before meals  insulin lispro (HumaLOG) corrective regimen sliding scale   SubCutaneous at bedtime  mycophenolate mofetil 1000 milliGRAM(s) Oral <User Schedule>  nystatin    Suspension 016340 Unit(s) Oral every 6 hours  pantoprazole  Injectable 40 milliGRAM(s) IV Push two times a day  predniSONE   Tablet 12.5 milliGRAM(s) Oral <User Schedule>  tacrolimus 8 milliGRAM(s) Oral <User Schedule>  valGANciclovir 450 milliGRAM(s) Oral every 12 hours  vancomycin  IVPB 1000 milliGRAM(s) IV Intermittent every 12 hours                    Physical Therapy Rec:   Home  [  ]   Home w/ PT  [  ]  Rehab  [  ]  Discussed with Cardiothoracic Team at AM rounds.

## 2018-03-12 NOTE — CHART NOTE - NSCHARTNOTEFT_GEN_A_CORE
Source: Patient [ x ]    Family [ ]     other [ x ] Comprehensive chart review, RN     Pt seen for nutrition follow up. Reports good appetite and eating 100% of meals, however with reluctance to follow potassium resricted diet (recent hyperkalemia noted). Pt is drinking 1 Ensure Enlive daily; encouraged to drink an additional supplement as a bedtime snack.  Pt reports no GI distress at this time.     Reviewed Heart Healthy diet, including sodium content of foods. Reviewed food safety guidelines for transplant recipients. Pt was receptive and expressed understanding.    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 Noted per ID pt with right lower lobe atelectasis vs early PNA; s/p bronchoscopy- no mucus plug and atelectasis improving. Pt s/p biopsy, showed CMR 1R, AMR 1 (no cellular injury)    Diet : regular, low fiber, no concentrated potassium, Ensure Enlive 3 cans/day    Admission Weight: 78.9kg  Current Weight: 79.8kg (3/12)  Weight fluctuations noted, likely due to fluid shifts. Pt with 1+generalized and 2+right arm edema.       Pertinent Medications: MEDICATIONS  (STANDING):  ALBUTerol/ipratropium for Nebulization 3 milliLiter(s) Nebulizer every 6 hours  aspirin enteric coated 81 milliGRAM(s) Oral daily  atorvastatin 10 milliGRAM(s) Oral at bedtime  atovaquone Suspension 1500 milliGRAM(s) Oral daily  Calcium Citrate + Vit D, 315 mg/250 Unit 2 Tablet(s) 2 Tablet(s) Oral two times a day  cefepime  IVPB      cefepime  IVPB 2000 milliGRAM(s) IV Intermittent every 8 hours  diltiazem    milliGRAM(s) Oral daily  docusate sodium 100 milliGRAM(s) Oral three times a day  enoxaparin Injectable 80 milliGRAM(s) SubCutaneous every 12 hours  insulin lispro (HumaLOG) corrective regimen sliding scale   SubCutaneous three times a day before meals  insulin lispro (HumaLOG) corrective regimen sliding scale   SubCutaneous at bedtime  multivitamin 1 Tablet(s) Oral daily  mycophenolate mofetil 1000 milliGRAM(s) Oral <User Schedule>  nystatin    Suspension 636117 Unit(s) Oral every 6 hours  pantoprazole    Tablet 40 milliGRAM(s) Oral before breakfast  predniSONE   Tablet 12.5 milliGRAM(s) Oral <User Schedule>  silver sulfADIAZINE 1% Cream 1 Application(s) Topical two times a day  tacrolimus 8 milliGRAM(s) Oral <User Schedule>  valGANciclovir 450 milliGRAM(s) Oral every 12 hours  vancomycin  IVPB 1000 milliGRAM(s) IV Intermittent every 12 hours    MEDICATIONS  (PRN):  acetaminophen   Tablet. 650 milliGRAM(s) Oral every 6 hours PRN Mild Pain (1 - 3)    Pertinent Labs:        Hemoglobin: 7.7 g/dL - low    Hematocrit: 23.5 % - low    Sodium, Serum: 140 mmol/L    Potassium, Serum: 4.9 mmol/L    Chloride, Serum: 108 mmol/L    Carbon Dioxide, Serum: 20mmol/L - low    Anion Gap, Serum: 12 mmol/L    Blood Urea Nitrogen, Serum: 24mg/dL - high    Creatinine, Serum: 1.08 mg/dL    Glucose, Serum: 111 mg/dL - high    Calcium, Total Serum: 8.5 mg/dL    Protein Total, Serum: 6.2 g/dL    Albumin, Serum: 3.0 g/dL - low    Bilirubin Total, Serum: 0.4 mg/dL    Alkaline Phosphatase, Serum: 80 U/L    Aspartate Aminotransferase (AST/SGOT): 24 U/L    Alanine Aminotransferase (ALT/SGPT): 25 U/L RC     CAPILLARY BLOOD GLUCOSE  POCT Blood Glucose.: 98 mg/dL (12 Mar 2018 11:49)  POCT Blood Glucose.: 120 mg/dL (12 Mar 2018 07:40)  POCT Blood Glucose.: 122 mg/dL (11 Mar 2018 22:32)  POCT Blood Glucose.: 111 mg/dL (11 Mar 2018 17:32)         Skin: No pressure ulcers noted. Pt noted with possible necrosis of 3rd Right finger; hand/plastics consulted.    Estimated Needs:   [ x ] no change since previous assessment  [ ] recalculated:       Previous Nutrition Diagnosis:   Limited adherence to nutrition related recommendations being addressed with continued diet education.   Inadequate oral intake and Increased Nutrient Needs being addressed with PO diet and supplements.      New Nutrition Diagnosis: [ x ] not applicable    Interventions:     Recommend  1. Encourage pt to maintain good PO intake.   2. Provide food preferences within therapeutic diet when requested.   3. Reviewed/reinforced food safety and heart healthy diet guidelines.      Monitoring and Evaluation:   Follow up per protocol  RD to remain available for further nutritional interventions as indicated.   Lilia Zavaleta, MS RD N [facility], #[phone]. Source: Patient [ x ]    Family [ ]     other [ x ] Comprehensive chart review, RN     Pt seen for nutrition follow up. Reports good appetite and eating 100% of meals, however with reluctance to follow potassium resricted diet (recent hyperkalemia noted). Pt is drinking 1 Ensure Enlive daily; encouraged to drink an additional supplement as a bedtime snack.  Pt reports no GI distress at this time.     Reviewed Heart Healthy diet, including sodium content of foods. Reviewed food safety guidelines for transplant recipients. Reviewed high potassium foods; pt was receptive and expressed understanding.    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 Noted per ID pt with right lower lobe atelectasis vs early PNA; s/p bronchoscopy- no mucus plug and atelectasis improving. Pt s/p biopsy, showed CMR 1R, AMR 1 (no cellular injury)    Diet : regular, low fiber, no concentrated potassium, Ensure Enlive 3 cans/day    Admission Weight: 78.9kg  Current Weight: 79.8kg (3/12)  Weight fluctuations noted, likely due to fluid shifts. Pt with 1+generalized and 2+right arm edema.       Pertinent Medications: MEDICATIONS  (STANDING):  ALBUTerol/ipratropium for Nebulization 3 milliLiter(s) Nebulizer every 6 hours  aspirin enteric coated 81 milliGRAM(s) Oral daily  atorvastatin 10 milliGRAM(s) Oral at bedtime  atovaquone Suspension 1500 milliGRAM(s) Oral daily  Calcium Citrate + Vit D, 315 mg/250 Unit 2 Tablet(s) 2 Tablet(s) Oral two times a day  cefepime  IVPB      cefepime  IVPB 2000 milliGRAM(s) IV Intermittent every 8 hours  diltiazem    milliGRAM(s) Oral daily  docusate sodium 100 milliGRAM(s) Oral three times a day  enoxaparin Injectable 80 milliGRAM(s) SubCutaneous every 12 hours  insulin lispro (HumaLOG) corrective regimen sliding scale   SubCutaneous three times a day before meals  insulin lispro (HumaLOG) corrective regimen sliding scale   SubCutaneous at bedtime  multivitamin 1 Tablet(s) Oral daily  mycophenolate mofetil 1000 milliGRAM(s) Oral <User Schedule>  nystatin    Suspension 157205 Unit(s) Oral every 6 hours  pantoprazole    Tablet 40 milliGRAM(s) Oral before breakfast  predniSONE   Tablet 12.5 milliGRAM(s) Oral <User Schedule>  silver sulfADIAZINE 1% Cream 1 Application(s) Topical two times a day  tacrolimus 8 milliGRAM(s) Oral <User Schedule>  valGANciclovir 450 milliGRAM(s) Oral every 12 hours  vancomycin  IVPB 1000 milliGRAM(s) IV Intermittent every 12 hours    MEDICATIONS  (PRN):  acetaminophen   Tablet. 650 milliGRAM(s) Oral every 6 hours PRN Mild Pain (1 - 3)    Pertinent Labs:        Hemoglobin: 7.7 g/dL - low    Hematocrit: 23.5 % - low    Sodium, Serum: 140 mmol/L    Potassium, Serum: 4.9 mmol/L    Chloride, Serum: 108 mmol/L    Carbon Dioxide, Serum: 20mmol/L - low    Anion Gap, Serum: 12 mmol/L    Blood Urea Nitrogen, Serum: 24mg/dL - high    Creatinine, Serum: 1.08 mg/dL    Glucose, Serum: 111 mg/dL - high    Calcium, Total Serum: 8.5 mg/dL    Protein Total, Serum: 6.2 g/dL    Albumin, Serum: 3.0 g/dL - low    Bilirubin Total, Serum: 0.4 mg/dL    Alkaline Phosphatase, Serum: 80 U/L    Aspartate Aminotransferase (AST/SGOT): 24 U/L    Alanine Aminotransferase (ALT/SGPT): 25 U/L      CAPILLARY BLOOD GLUCOSE  POCT Blood Glucose.: 98 mg/dL (12 Mar 2018 11:49)  POCT Blood Glucose.: 120 mg/dL (12 Mar 2018 07:40)  POCT Blood Glucose.: 122 mg/dL (11 Mar 2018 22:32)  POCT Blood Glucose.: 111 mg/dL (11 Mar 2018 17:32)         Skin: No pressure ulcers noted. Pt noted with possible necrosis of 3rd Right finger; hand/plastics consulted.    Estimated Needs:   [ x ] no change since previous assessment  [ ] recalculated:       Previous Nutrition Diagnosis:   Limited adherence to nutrition related recommendations being addressed with continued diet education.   Inadequate oral intake and Increased Nutrient Needs being addressed with PO diet and supplements.      New Nutrition Diagnosis: [ x ] not applicable    Interventions:     Recommend  1. Encourage pt to maintain good PO intake.   2. Provide food preferences within therapeutic diet when requested.   3. Reviewed/reinforced food safety and heart healthy diet guidelines.      Monitoring and Evaluation:   Follow up per protocol  RD to remain available for further nutritional interventions as indicated.   Lilia Zavaleta, MS RD N [facility], #[phone].

## 2018-03-12 NOTE — PROGRESS NOTE ADULT - ASSESSMENT
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     Upper extremity DVT  - Continue Lovenox 1mg/kg BID   - elevate arms above heart level and continue with light compression to R arm with 4 inch ace wrap from hand to axilla.    - appreciate plastics recs/rheum  regarding R 3rd digit - continue with current therapy  - patient has bilateral Subclavian DVT - would avoid catheter placement to the area if possible.    - obtain repeat upper extremity bilateral venous duplex today.   - would recheck CBC later today.        Will follow with you    Thanks    Saurabh Tavarez  [phone]

## 2018-03-12 NOTE — PROGRESS NOTE ADULT - ATTENDING COMMENTS
Clinically improving and feels really well. He is ambulating and working hard to learn about his transplant medications.  Persistent leukocytosis of unclear etiology, but right third digit appears ischemic (followed by plastics). He denies any pain in the hand or fingers.  On cefepime for pseudomonas in sputum and vancomycin for DLES drainage. Afebrile.  On Lovenox 80 mg SC BID for BUE DVT. Both arms are better and he has less facial swelling.    Tacrolimus trough level has been up/down for last few days with steady dosing of 8 mg BID. Would continue at current dose given within therapeutic window. On CellCept 1 gm PO BID and prednisone 12.5 mg po BID until next biopsy is resulted. EMB on Wed AM.    Please start ECASA 81 mg daily, Pravachol 20 mg po QHS, change protonix to 40 mg po daily, add MVI, and add Citracal +D 2 tabs po BID.   Discharge planning for later this week.

## 2018-03-12 NOTE — PROGRESS NOTE ADULT - SUBJECTIVE AND OBJECTIVE BOX
INFECTIOUS DISEASES FOLLOW UP-- Sujey Lujan  [phone]    This is a follow up note for this  67yMale with  Heart replaced by heart assist device      ROS:  CONSTITUTIONAL:  No fever, good appetite  CARDIOVASCULAR:  No chest pain or palpitations  RESPIRATORY:  No dyspnea  GASTROINTESTINAL:  No nausea, vomiting, diarrhea, or abdominal pain  GENITOURINARY:  No dysuria  NEUROLOGIC:  No headache,     Allergies    No Known Allergies    Intolerances        ANTIBIOTICS/RELEVANT:  antimicrobials  atovaquone Suspension 1500 milliGRAM(s) Oral daily  cefepime  IVPB      cefepime  IVPB 2000 milliGRAM(s) IV Intermittent every 8 hours  nystatin    Suspension 247352 Unit(s) Oral every 6 hours  valGANciclovir 450 milliGRAM(s) Oral every 12 hours  vancomycin  IVPB 1000 milliGRAM(s) IV Intermittent every 12 hours    immunologic:  mycophenolate mofetil 1000 milliGRAM(s) Oral <User Schedule>  tacrolimus 8 milliGRAM(s) Oral <User Schedule>    OTHER:  acetaminophen   Tablet. 650 milliGRAM(s) Oral every 6 hours PRN  ALBUTerol/ipratropium for Nebulization 3 milliLiter(s) Nebulizer every 6 hours  aspirin enteric coated 81 milliGRAM(s) Oral daily  atorvastatin 10 milliGRAM(s) Oral at bedtime  Calcium Citrate + Vit D, 315 mg/200 Unit 2 Tablet(s) 2 Tablet(s) Oral two times a day  diltiazem    milliGRAM(s) Oral daily  docusate sodium 100 milliGRAM(s) Oral three times a day  enoxaparin Injectable 80 milliGRAM(s) SubCutaneous every 12 hours  insulin lispro (HumaLOG) corrective regimen sliding scale   SubCutaneous three times a day before meals  insulin lispro (HumaLOG) corrective regimen sliding scale   SubCutaneous at bedtime  multivitamin 1 Tablet(s) Oral daily  pantoprazole    Tablet 40 milliGRAM(s) Oral before breakfast  predniSONE   Tablet 12.5 milliGRAM(s) Oral <User Schedule>  silver sulfADIAZINE 1% Cream 1 Application(s) Topical two times a day      Objective:  Vital Signs Last 24 Hrs  T(C): 36.3 (12 Mar 2018 15:17), Max: 37.1 (11 Mar 2018 19:29)  T(F): 97.4 (12 Mar 2018 15:17), Max: 98.7 (11 Mar 2018 19:29)  HR: 96 (12 Mar 2018 15:17) (92 - 112)  BP: 124/90 (12 Mar 2018 15:17) (110/69 - 139/88)  BP(mean): 96 (12 Mar 2018 05:07) (85 - 96)  RR: 18 (12 Mar 2018 15:17) (15 - 18)  SpO2: 99% (12 Mar 2018 15:17) (98% - 100%)    PHYSICAL EXAM:  Constitutional:no acute distress  Eyes:WALT, EOMI  Ear/Nose/Throat: no oral lesions, 	  Respiratory: decreased BS at right base  Cardiovascular: S1S2  Gastrointestinal:soft, (+) BS, no tenderness  Extremities:no e/e/c RUE middle finger looks a bit  better perfused  No Lymphadenopathy  IV sites not inflammed.    LABS:                        9.2    28.6  )-----------( 396      ( 12 Mar 2018 16:44 )             27.6     03-12    137  |  106  |  27<H>  ----------------------------<  115<H>  4.9   |  18<L>  |  1.09    Ca    8.6      12 Mar 2018 16:44  Mg     1.6     03-12    TPro  6.6  /  Alb  3.2<L>  /  TBili  0.4  /  DBili  x   /  AST  25  /  ALT  29  /  AlkPhos  89  03-12          MICROBIOLOGY:  Culture Results:   Few Pseudomonas aeruginosa  Normal Respiratory Heaven present (03-11 @ 22:57)            RECENT CULTURES:  03-11 @ 22:57  .Sputum Sputum  --  --  --    Few Pseudomonas aeruginosa  Normal Respiratory [place] present  --  03-10 @ 14:32  .Urine Clean Catch (Midstream)  --  --  --    No growth  --  03-09 @ 22:00  .Blood Blood  --  --  --    No growth to date.  --  03-09 @ 13:03  .Blood Blood  --  --  --    No growth to date.  --  03-05 @ 17:59  Skin LVAD driveline site  --  --  --    No growth at 48 hours  --      RADIOLOGY & ADDITIONAL STUDIES:    < from: Xray Chest 1 View- PORTABLE-Routine (03.12.18 @ 06:27) >    IMPRESSION:   Persistent near complete collapse of the right middle and lower lobes.    < end of copied text >

## 2018-03-12 NOTE — PROGRESS NOTE ADULT - SUBJECTIVE AND OBJECTIVE BOX
Subjective:  PRBC given this morning for Hgb 7.7 from 8.5. No overt s/s of bleeding. Reports feeling well. Ambulated around the unit x 2 without LOBATO. Denies SOB at rest, CP, palpitations, lightheadedness, dizziness, fever, chills.     Medications:  acetaminophen   Tablet. 650 milliGRAM(s) Oral every 6 hours PRN  ALBUTerol/ipratropium for Nebulization 3 milliLiter(s) Nebulizer every 6 hours  aspirin enteric coated 81 milliGRAM(s) Oral daily  atorvastatin 10 milliGRAM(s) Oral at bedtime  atovaquone Suspension 1500 milliGRAM(s) Oral daily  Calcium Citrate + Vit D, 315 mg/250 Unit 2 Tablet(s) 2 Tablet(s) Oral two times a day  cefepime  IVPB      cefepime  IVPB 2000 milliGRAM(s) IV Intermittent every 8 hours  diltiazem    milliGRAM(s) Oral daily  docusate sodium 100 milliGRAM(s) Oral three times a day  enoxaparin Injectable 80 milliGRAM(s) SubCutaneous every 12 hours  insulin lispro (HumaLOG) corrective regimen sliding scale   SubCutaneous three times a day before meals  insulin lispro (HumaLOG) corrective regimen sliding scale   SubCutaneous at bedtime  multivitamin 1 Tablet(s) Oral daily  mycophenolate mofetil 1000 milliGRAM(s) Oral <User Schedule>  nystatin    Suspension 972216 Unit(s) Oral every 6 hours  pantoprazole    Tablet 40 milliGRAM(s) Oral before breakfast  predniSONE   Tablet 12.5 milliGRAM(s) Oral <User Schedule>  silver sulfADIAZINE 1% Cream 1 Application(s) Topical two times a day  tacrolimus 8 milliGRAM(s) Oral <User Schedule>  valGANciclovir 450 milliGRAM(s) Oral every 12 hours  vancomycin  IVPB 1000 milliGRAM(s) IV Intermittent every 12 hours      Physical Exam:    Vitals:  Vital Signs Last 24 Hours  T(C): 36.3 (18 @ 15:17), Max: 37.1 (18 @ 19:29)  HR: 96 (18 @ 15:17) (92 - 112)  BP: 124/90 (18 @ 15:17) (110/69 - 139/88)  RR: 18 (18 @ 15:17) (15  18)  SpO2: 99% (18 @ 15:17) (98% - 100%)    Weight in k.8 ( @ 05:07)    I&O's Summary    11 Mar 2018 07:  -  12 Mar 2018 07:00  --------------------------------------------------------  IN: 500 mL / OUT: 900 mL / NET: -400 mL    12 Mar 2018 07:01  -  12 Mar 2018 16:52  --------------------------------------------------------  IN: 1460 mL / OUT: 450 mL / NET: 1010 mL    Tele: [facility] 90-110s    General: OOB in chair. No distress. Comfortable.  Neck: Neck supple. JVP <6cm H2O.  Chest: Clear to auscultation bilaterally  CV: RRR, Normal S1 and S2. No murmurs, rub, or gallops. +2 radial pulses bilaterally. RUE edematous, wrapped with ace wrap. No LE edema.   Abdomen: Soft, non-distended, non-tender, + BS.   Skin: Wound vac to RLQ abd with no drainage in tubing, scant serous in canister of wound vac. Dressing to midline abd wound c/d/i. Sternal incision well approximated without drainage. R groin dressing s/p bx c/d/i.   Ext: Distal R third digit dusky.   Neurology: Alert and oriented times three. Sensation intact  Psych: Affect normal    Labs:                        7.7    25.8  )-----------( 375      ( 12 Mar 2018 05:35 )             23.5     12    140  |  108  |  24<H>  ----------------------------<  111<H>  4.9   |  20<L>  |  1.08    Ca    8.5      12 Mar 2018 05:35  Mg     1.6     03-12    Tacrolimus (), Serum (18 @ 09:03)    Tacrolimus (), Serum: 11.5    Tacrolimus (), Serum (18 @ 08:06)    Tacrolimus (), Serum: 14.2

## 2018-03-13 LAB
-  AMIKACIN: SIGNIFICANT CHANGE UP
-  AZTREONAM: SIGNIFICANT CHANGE UP
-  CEFEPIME: SIGNIFICANT CHANGE UP
-  CEFTAZIDIME: SIGNIFICANT CHANGE UP
-  CIPROFLOXACIN: SIGNIFICANT CHANGE UP
-  GENTAMICIN: SIGNIFICANT CHANGE UP
-  IMIPENEM: SIGNIFICANT CHANGE UP
-  LEVOFLOXACIN: SIGNIFICANT CHANGE UP
-  MEROPENEM: SIGNIFICANT CHANGE UP
-  PIPERACILLIN/TAZOBACTAM: SIGNIFICANT CHANGE UP
-  TOBRAMYCIN: SIGNIFICANT CHANGE UP
ANION GAP SERPL CALC-SCNC: 11 MMOL/L — SIGNIFICANT CHANGE UP (ref 5–17)
BUN SERPL-MCNC: 25 MG/DL — HIGH (ref 7–23)
CALCIUM SERPL-MCNC: 8.9 MG/DL — SIGNIFICANT CHANGE UP (ref 8.4–10.5)
CHLORIDE SERPL-SCNC: 106 MMOL/L — SIGNIFICANT CHANGE UP (ref 96–108)
CO2 SERPL-SCNC: 18 MMOL/L — LOW (ref 22–31)
CREAT SERPL-MCNC: 1.1 MG/DL — SIGNIFICANT CHANGE UP (ref 0.5–1.3)
CULTURE RESULTS: SIGNIFICANT CHANGE UP
GLUCOSE BLDC GLUCOMTR-MCNC: 111 MG/DL — HIGH (ref 70–99)
GLUCOSE BLDC GLUCOMTR-MCNC: 115 MG/DL — HIGH (ref 70–99)
GLUCOSE BLDC GLUCOMTR-MCNC: 141 MG/DL — HIGH (ref 70–99)
GLUCOSE BLDC GLUCOMTR-MCNC: 143 MG/DL — HIGH (ref 70–99)
GLUCOSE SERPL-MCNC: 136 MG/DL — HIGH (ref 70–99)
HCT VFR BLD CALC: 27.1 % — LOW (ref 39–50)
HGB BLD-MCNC: 8.9 G/DL — LOW (ref 13–17)
MCHC RBC-ENTMCNC: 29.9 PG — SIGNIFICANT CHANGE UP (ref 27–34)
MCHC RBC-ENTMCNC: 32.7 GM/DL — SIGNIFICANT CHANGE UP (ref 32–36)
MCV RBC AUTO: 91.3 FL — SIGNIFICANT CHANGE UP (ref 80–100)
METHOD TYPE: SIGNIFICANT CHANGE UP
ORGANISM # SPEC MICROSCOPIC CNT: SIGNIFICANT CHANGE UP
ORGANISM # SPEC MICROSCOPIC CNT: SIGNIFICANT CHANGE UP
PLATELET # BLD AUTO: 356 K/UL — SIGNIFICANT CHANGE UP (ref 150–400)
POTASSIUM SERPL-MCNC: 4.6 MMOL/L — SIGNIFICANT CHANGE UP (ref 3.5–5.3)
POTASSIUM SERPL-SCNC: 4.6 MMOL/L — SIGNIFICANT CHANGE UP (ref 3.5–5.3)
RBC # BLD: 2.97 M/UL — LOW (ref 4.2–5.8)
RBC # FLD: 17.4 % — HIGH (ref 10.3–14.5)
SODIUM SERPL-SCNC: 135 MMOL/L — SIGNIFICANT CHANGE UP (ref 135–145)
SPECIMEN SOURCE: SIGNIFICANT CHANGE UP
TACROLIMUS SERPL-MCNC: 10.9 NG/ML — SIGNIFICANT CHANGE UP
WBC # BLD: 22.6 K/UL — HIGH (ref 3.8–10.5)
WBC # FLD AUTO: 22.6 K/UL — HIGH (ref 3.8–10.5)

## 2018-03-13 PROCEDURE — 99233 SBSQ HOSP IP/OBS HIGH 50: CPT

## 2018-03-13 PROCEDURE — 99232 SBSQ HOSP IP/OBS MODERATE 35: CPT

## 2018-03-13 PROCEDURE — 71045 X-RAY EXAM CHEST 1 VIEW: CPT | Mod: 26

## 2018-03-13 RX ORDER — DILTIAZEM HCL 120 MG
180 CAPSULE, EXT RELEASE 24 HR ORAL DAILY
Qty: 0 | Refills: 0 | Status: DISCONTINUED | OUTPATIENT
Start: 2018-03-14 | End: 2018-03-18

## 2018-03-13 RX ORDER — MAGNESIUM OXIDE 400 MG ORAL TABLET 241.3 MG
400 TABLET ORAL
Qty: 0 | Refills: 0 | Status: DISCONTINUED | OUTPATIENT
Start: 2018-03-13 | End: 2018-03-18

## 2018-03-13 RX ADMIN — Medication 500000 UNIT(S): at 00:11

## 2018-03-13 RX ADMIN — Medication 1 TABLET(S): at 12:09

## 2018-03-13 RX ADMIN — MAGNESIUM OXIDE 400 MG ORAL TABLET 400 MILLIGRAM(S): 241.3 TABLET ORAL at 20:16

## 2018-03-13 RX ADMIN — Medication 1 APPLICATION(S): at 18:19

## 2018-03-13 RX ADMIN — ENOXAPARIN SODIUM 80 MILLIGRAM(S): 100 INJECTION SUBCUTANEOUS at 06:12

## 2018-03-13 RX ADMIN — Medication 12.5 MILLIGRAM(S): at 08:04

## 2018-03-13 RX ADMIN — Medication 250 MILLIGRAM(S): at 18:19

## 2018-03-13 RX ADMIN — CEFEPIME 100 MILLIGRAM(S): 1 INJECTION, POWDER, FOR SOLUTION INTRAMUSCULAR; INTRAVENOUS at 13:37

## 2018-03-13 RX ADMIN — ATOVAQUONE 1500 MILLIGRAM(S): 750 SUSPENSION ORAL at 12:12

## 2018-03-13 RX ADMIN — CEFEPIME 100 MILLIGRAM(S): 1 INJECTION, POWDER, FOR SOLUTION INTRAMUSCULAR; INTRAVENOUS at 05:09

## 2018-03-13 RX ADMIN — Medication 250 MILLIGRAM(S): at 06:12

## 2018-03-13 RX ADMIN — TACROLIMUS 8 MILLIGRAM(S): 5 CAPSULE ORAL at 08:04

## 2018-03-13 RX ADMIN — Medication 12.5 MILLIGRAM(S): at 20:04

## 2018-03-13 RX ADMIN — CEFEPIME 100 MILLIGRAM(S): 1 INJECTION, POWDER, FOR SOLUTION INTRAMUSCULAR; INTRAVENOUS at 22:08

## 2018-03-13 RX ADMIN — TACROLIMUS 8 MILLIGRAM(S): 5 CAPSULE ORAL at 20:04

## 2018-03-13 RX ADMIN — MYCOPHENOLATE MOFETIL 1000 MILLIGRAM(S): 250 CAPSULE ORAL at 08:04

## 2018-03-13 RX ADMIN — MYCOPHENOLATE MOFETIL 1000 MILLIGRAM(S): 250 CAPSULE ORAL at 20:04

## 2018-03-13 RX ADMIN — VALGANCICLOVIR 450 MILLIGRAM(S): 450 TABLET, FILM COATED ORAL at 20:04

## 2018-03-13 RX ADMIN — PANTOPRAZOLE SODIUM 40 MILLIGRAM(S): 20 TABLET, DELAYED RELEASE ORAL at 06:11

## 2018-03-13 RX ADMIN — Medication 500000 UNIT(S): at 06:11

## 2018-03-13 RX ADMIN — Medication 650 MILLIGRAM(S): at 15:00

## 2018-03-13 RX ADMIN — Medication 3 MILLILITER(S): at 06:11

## 2018-03-13 RX ADMIN — Medication 500000 UNIT(S): at 12:11

## 2018-03-13 RX ADMIN — Medication 500000 UNIT(S): at 18:19

## 2018-03-13 RX ADMIN — Medication 120 MILLIGRAM(S): at 06:11

## 2018-03-13 RX ADMIN — Medication 1 APPLICATION(S): at 06:11

## 2018-03-13 RX ADMIN — Medication 81 MILLIGRAM(S): at 12:15

## 2018-03-13 RX ADMIN — Medication 20 MILLIGRAM(S): at 22:08

## 2018-03-13 RX ADMIN — Medication 3 MILLILITER(S): at 00:11

## 2018-03-13 RX ADMIN — Medication 3 MILLILITER(S): at 12:09

## 2018-03-13 RX ADMIN — Medication 650 MILLIGRAM(S): at 14:28

## 2018-03-13 RX ADMIN — VALGANCICLOVIR 450 MILLIGRAM(S): 450 TABLET, FILM COATED ORAL at 08:05

## 2018-03-13 RX ADMIN — Medication 3 MILLILITER(S): at 18:19

## 2018-03-13 NOTE — PROGRESS NOTE ADULT - SUBJECTIVE AND OBJECTIVE BOX
PAGER:  [phone]               [facility] [id number]              EMAIL [email or web address]   OFFICE [phone]                              ********VASCULAR MEDICINE PROGRESS NOTE********                        CC:  UE DVT    INTERVAL HISTORY:    No issues.  Arms feel fine. RUE ACE wrap in place. Continues with lovenox     MEDICATIONS  (STANDING):  ALBUTerol/ipratropium for Nebulization 3 milliLiter(s) Nebulizer every 6 hours  aspirin enteric coated 81 milliGRAM(s) Oral daily  atovaquone Suspension 1500 milliGRAM(s) Oral daily  Calcium Citrate + Vit D, 315 mg/200 Unit 2 Tablet(s) 2 Tablet(s) Oral two times a day  cefepime  IVPB      cefepime  IVPB 2000 milliGRAM(s) IV Intermittent every 8 hours  diltiazem    milliGRAM(s) Oral daily  docusate sodium 100 milliGRAM(s) Oral three times a day  enoxaparin Injectable 80 milliGRAM(s) SubCutaneous every 12 hours  insulin lispro (HumaLOG) corrective regimen sliding scale   SubCutaneous three times a day before meals  insulin lispro (HumaLOG) corrective regimen sliding scale   SubCutaneous at bedtime  multivitamin 1 Tablet(s) Oral daily  mycophenolate mofetil 1000 milliGRAM(s) Oral <User Schedule>  nystatin    Suspension 573953 Unit(s) Oral every 6 hours  pantoprazole    Tablet 40 milliGRAM(s) Oral before breakfast  pravastatin 20 milliGRAM(s) Oral at bedtime  predniSONE   Tablet 12.5 milliGRAM(s) Oral <User Schedule>  silver sulfADIAZINE 1% Cream 1 Application(s) Topical two times a day  tacrolimus 8 milliGRAM(s) Oral <User Schedule>  valGANciclovir 450 milliGRAM(s) Oral every 12 hours  vancomycin  IVPB 1000 milliGRAM(s) IV Intermittent every 12 hours    MEDICATIONS  (PRN):  acetaminophen   Tablet. 650 milliGRAM(s) Oral every 6 hours PRN Mild Pain (1 - 3)  PAST MEDICAL & SURGICAL HISTORY:  GIB (gastrointestinal bleeding)  Ventricular fibrillation: s/p AICD  PAF (paroxysmal atrial fibrillation): on xarelto  Non-Ischemic Cardiomyopathy  SVT (Supraventricular Tachycardia)  HTN  CHF (Congestive Heart Failure)  LVAD (left ventricular assist device) present  Status post left hip replacement  History of Prior Ablation Treatment: for afib  AICD (Automatic Cardioverter/Defibrillator) Present: [employer] with 1 [employer] lead4/1/09- explanted and replaced with [employer] 2 leads on 9/2/09      FAMILY HISTORY:  No pertinent family history in first degree relatives      SOCIAL HISTORY:  unchanged    REVIEW OF SYSTEMS:  CONSTITUTIONAL: No fever, weight loss, or fatigue  EYES: No eye pain, visual disturbances, or discharge  ENMT:  No difficulty hearing, tinnitus, vertigo; No sinus or throat pain  NECK: No pain or stiffness  RESPIRATORY: No cough, wheezing, chills or hemoptysis; No Shortness of Breath  CARDIOVASCULAR: No chest pain, palpitations, passing out, dizziness, or leg swelling  GASTROINTESTINAL: No abdominal or epigastric pain. No nausea, vomiting, or hematemesis; No diarrhea or constipation. No melena or hematochezia.  GENITOURINARY: No dysuria, frequency, hematuria, or incontinence  NEUROLOGICAL: No headaches, memory loss, loss of strength, numbness, or tremors  SKIN: No itching, burning, rashes, or lesions   LYMPH Nodes: No enlarged glands  ENDOCRINE: No heat or cold intolerance; No hair loss  MUSCULOSKELETAL: No joint pain or swelling; No muscle, back, or extremity pain  PSYCHIATRIC: No depression, anxiety, mood swings, or difficulty sleeping  HEME/LYMPH: No easy bruising, or bleeding gums  ALLERY AND IMMUNOLOGIC: No hives or eczema	    [x ] All others negative	  [ ] Unable to obtain    ICU Vital Signs Last 24 Hrs  T(C): 36.8 (13 Mar 2018 10:14), Max: 36.8 (13 Mar 2018 10:14)  T(F): 98.3 (13 Mar 2018 10:14), Max: 98.3 (13 Mar 2018 10:14)  HR: 101 (13 Mar 2018 10:14) (96 - 101)  BP: 125/65 (13 Mar 2018 10:14) (124/90 - 137/78)  BP(mean): 98 (13 Mar 2018 03:00) (98 - 98)  ABP: --  ABP(mean): --  RR: 18 (13 Mar 2018 10:14) (18 - 18)  SpO2: 97% (13 Mar 2018 10:14) (93% - 99%)    Appearance: Normal, on high flow oxgyen  HEENT:   Normal oral mucosa, PERRL, EOMI	  Lymphatic: No lymphadenopathy  Cardiovascular: Normal S1 S2 tachycardia.   Respiratory: clear	  Psychiatry: A & O x 3, Mood & affect appropriate  Gastrointestinal:  Soft, Non-tender, + BS	  Skin: No rashes, No ecchymoses, No cyanosis	  Neurologic: Non-focal  Extremities: RUE edema.  3rd digit R hand ischemia, motion and sensation intact.                                          8.9    22.6  )-----------( 356      ( 13 Mar 2018 07:07 )             27.1   03-13    135  |  106  |  25<H>  ----------------------------<  136<H>  4.6   |  18<L>  |  1.10    Ca    8.9      13 Mar 2018 07:07  Mg     1.6     03-12    TPro  6.6  /  Alb  3.2<L>  /  TBili  0.4  /  DBili  x   /  AST  25  /  ALT  29  /  AlkPhos  89  03-12

## 2018-03-13 NOTE — PROGRESS NOTE ADULT - SUBJECTIVE AND OBJECTIVE BOX
INFECTIOUS DISEASES FOLLOW UP-- Sujey Lujan  [phone]    This is a follow up note for this  67yMale with s/p OHTx on 2/23, slowly improving in stamina. ID following for leukocytosis      ROS:  CONSTITUTIONAL:  No fever, good appetite  CARDIOVASCULAR:  No chest pain or palpitations  RESPIRATORY:  No dyspnea  GASTROINTESTINAL:  No nausea, vomiting, diarrhea, or abdominal pain  GENITOURINARY:  No dysuria  NEUROLOGIC:  No headache,     Allergies    No Known Allergies    Intolerances        ANTIBIOTICS/RELEVANT:  antimicrobials  atovaquone Suspension 1500 milliGRAM(s) Oral daily  cefepime  IVPB      cefepime  IVPB 2000 milliGRAM(s) IV Intermittent every 8 hours  nystatin    Suspension 567306 Unit(s) Oral every 6 hours  valGANciclovir 450 milliGRAM(s) Oral every 12 hours  vancomycin  IVPB 1000 milliGRAM(s) IV Intermittent every 12 hours    immunologic:  mycophenolate mofetil 1000 milliGRAM(s) Oral <User Schedule>  tacrolimus 8 milliGRAM(s) Oral <User Schedule>    OTHER:  acetaminophen   Tablet. 650 milliGRAM(s) Oral every 6 hours PRN  ALBUTerol/ipratropium for Nebulization 3 milliLiter(s) Nebulizer every 6 hours  aspirin enteric coated 81 milliGRAM(s) Oral daily  Calcium Citrate + Vit D, 315 mg/200 Unit 2 Tablet(s) 2 Tablet(s) Oral two times a day  docusate sodium 100 milliGRAM(s) Oral three times a day  insulin lispro (HumaLOG) corrective regimen sliding scale   SubCutaneous three times a day before meals  insulin lispro (HumaLOG) corrective regimen sliding scale   SubCutaneous at bedtime  magnesium oxide 400 milliGRAM(s) Oral three times a day with meals  multivitamin 1 Tablet(s) Oral daily  pantoprazole    Tablet 40 milliGRAM(s) Oral before breakfast  pravastatin 20 milliGRAM(s) Oral at bedtime  predniSONE   Tablet 12.5 milliGRAM(s) Oral <User Schedule>  silver sulfADIAZINE 1% Cream 1 Application(s) Topical two times a day      Objective:  Vital Signs Last 24 Hrs  T(C): 36.8 (13 Mar 2018 15:09), Max: 36.8 (13 Mar 2018 10:14)  T(F): 98.2 (13 Mar 2018 15:09), Max: 98.3 (13 Mar 2018 10:14)  HR: 107 (13 Mar 2018 15:09) (99 - 107)  BP: 115/75 (13 Mar 2018 15:09) (115/75 - 137/78)  BP(mean): 98 (13 Mar 2018 03:00) (98 - 98)  RR: 18 (13 Mar 2018 15:09) (18 - 18)  SpO2: 100% (13 Mar 2018 15:09) (93% - 100%)    PHYSICAL EXAM:  Constitutional:no acute distress  Eyes:WALT, EOMI  Ear/Nose/Throat: no oral lesions, 	  Respiratory: clear anteriorly but absent at right base  Cardiovascular: S1S2  Gastrointestinal:soft, (+) BS, no tenderness  Extremities:no e/e/c  No Lymphadenopathy  IV sites not inflammed.    LABS:                        8.9    22.6  )-----------( 356      ( 13 Mar 2018 07:07 )             27.1     03-13    135  |  106  |  25<H>  ----------------------------<  136<H>  4.6   |  18<L>  |  1.10    Ca    8.9      13 Mar 2018 07:07  Mg     1.6     03-12    TPro  6.6  /  Alb  3.2<L>  /  TBili  0.4  /  DBili  x   /  AST  25  /  ALT  29  /  AlkPhos  89  03-12          MICROBIOLOGY:      Vanco trough=13 on 3/12      RECENT CULTURES:  03-11 @ 22:57  .Sputum Sputum  Pseudomonas aeruginosa (Carbapenem Resistant)  Pseudomonas aeruginosa (Carbapenem Resistant)  KAMILA    Few Pseudomonas aeruginosa  Normal Respiratory Heaven present  --  03-10 @ 14:32  .Urine Clean Catch (Midstream)  --  --  --    No growth  --  03-09 @ 22:00  .Blood Blood  --  --  --    No growth to date.  --  03-09 @ 13:03  .Blood Blood  --  --  --    No growth to date.  --      RADIOLOGY & ADDITIONAL STUDIES:    < from: Xray Chest 1 View- PORTABLE-Routine (03.13.18 @ 05:45) >    IMPRESSION:  Persistent atelectasis of the right middle and lower lobes. No interval   change compared to prior study.    < end of copied text >

## 2018-03-13 NOTE — PROGRESS NOTE ADULT - SUBJECTIVE AND OBJECTIVE BOX
VITAL SIGNS    Telemetry:      Vital Signs Last 24 Hrs  T(C): 36.8 (18 @ 10:14), Max: 36.8 (18 @ 10:14)  T(F): 98.3 (18 @ 10:14), Max: 98.3 (18 @ 10:14)  HR: 101 (18 @ 10:14) (96 - 101)  BP: 125/65 (18 @ 10:14) (124/90 - 137/78)  RR: 18 (18 @ 10:14) (18 - 18)  SpO2: 97% (18 @ 10:14) (93% - 99%)                    @ 07:01  -   @ 07:00  --------------------------------------------------------  IN: 2310 mL / OUT: 1450 mL / NET: 860 mL     @ 07:01  -   @ 11:19  --------------------------------------------------------  IN: 360 mL / OUT: 300 mL / NET: 60 mL          Daily     Daily Weight in k.4 (12 Mar 2018 20:17)        CAPILLARY BLOOD GLUCOSE      POCT Blood Glucose.: 115 mg/dL (13 Mar 2018 07:43)  POCT Blood Glucose.: 84 mg/dL (12 Mar 2018 22:12)  POCT Blood Glucose.: 85 mg/dL (12 Mar 2018 21:50)  POCT Blood Glucose.: 118 mg/dL (12 Mar 2018 19:03)  POCT Blood Glucose.: 98 mg/dL (12 Mar 2018 11:49)                  Coumadin    [ ] YES          [  ]      NO         Reason:                               PHYSICAL EXAM        Neurology: alert and oriented x 3, nonfocal, no gross deficits  CV :  Sternal Wound :  CDI , Stable  Pacing Wires        [  ]   Settings:                                  Isolated  [  ]  Lungs:  Drains:     MS         [  ] Drainage:                 L Pleural  [  ]  Drainage:                R Pleural  [  ]  Drainage:  Abdomen: soft, nontender, nondistended, positive bowel sounds, last bowel movement   :             Extremities:               acetaminophen   Tablet. 650 milliGRAM(s) Oral every 6 hours PRN  ALBUTerol/ipratropium for Nebulization 3 milliLiter(s) Nebulizer every 6 hours  aspirin enteric coated 81 milliGRAM(s) Oral daily  atovaquone Suspension 1500 milliGRAM(s) Oral daily  Calcium Citrate + Vit D, 315 mg/200 Unit 2 Tablet(s) 2 Tablet(s) Oral two times a day  cefepime  IVPB      cefepime  IVPB 2000 milliGRAM(s) IV Intermittent every 8 hours  diltiazem    milliGRAM(s) Oral daily  docusate sodium 100 milliGRAM(s) Oral three times a day  enoxaparin Injectable 80 milliGRAM(s) SubCutaneous every 12 hours  insulin lispro (HumaLOG) corrective regimen sliding scale   SubCutaneous three times a day before meals  insulin lispro (HumaLOG) corrective regimen sliding scale   SubCutaneous at bedtime  multivitamin 1 Tablet(s) Oral daily  mycophenolate mofetil 1000 milliGRAM(s) Oral <User Schedule>  nystatin    Suspension 101063 Unit(s) Oral every 6 hours  pantoprazole    Tablet 40 milliGRAM(s) Oral before breakfast  pravastatin 20 milliGRAM(s) Oral at bedtime  predniSONE   Tablet 12.5 milliGRAM(s) Oral <User Schedule>  silver sulfADIAZINE 1% Cream 1 Application(s) Topical two times a day  tacrolimus 8 milliGRAM(s) Oral <User Schedule>  valGANciclovir 450 milliGRAM(s) Oral every 12 hours  vancomycin  IVPB 1000 milliGRAM(s) IV Intermittent every 12 hours                    Physical Therapy Rec:   Home  [  ]   Home w/ PT  [  ]  Rehab  [  ]  Discussed with Cardiothoracic Team at AM rounds. im tired    VITAL SIGNS    Telemetry:  nsr 90    Vital Signs Last 24 Hrs  T(C): 36.8 (18 @ 10:14), Max: 36.8 (18 @ 10:14)  T(F): 98.3 (18 @ 10:14), Max: 98.3 (18 @ 10:14)  HR: 101 (18 @ 10:14) (96 - 101)  BP: 125/65 (18 @ 10:14) (124/90 - 137/78)  RR: 18 (18 @ 10:14) (18 - 18)  SpO2: 97% (18 @ 10:14) (93% - 99%)                    @ 07:01  -   @ 07:00  --------------------------------------------------------  IN: 2310 mL / OUT: 1450 mL / NET: 860 mL     @ 07:01  -   @ 11:19  --------------------------------------------------------  IN: 360 mL / OUT: 300 mL / NET: 60 mL          Daily     Daily Weight in k.4 (12 Mar 2018 20:17)        CAPILLARY BLOOD GLUCOSE      POCT Blood Glucose.: 115 mg/dL (13 Mar 2018 07:43)  POCT Blood Glucose.: 84 mg/dL (12 Mar 2018 22:12)  POCT Blood Glucose.: 85 mg/dL (12 Mar 2018 21:50)  POCT Blood Glucose.: 118 mg/dL (12 Mar 2018 19:03)  POCT Blood Glucose.: 98 mg/dL (12 Mar 2018 11:49)                  Coumadin    [ ] YES          [  x]      NO         Reason:                               PHYSICAL EXAM        Neurology: alert and oriented x 3, nonfocal, no gross deficits  CV : S1 S2 , RRR   Sternal Wound :  CDI , Stable  Lungs: cta_  Abdomen: soft, nontender, nondistended, positive bowel sounds, last bowel movement  +  :           +  Extremities:   - edema - calve tenderness            acetaminophen   Tablet. 650 milliGRAM(s) Oral every 6 hours PRN  ALBUTerol/ipratropium for Nebulization 3 milliLiter(s) Nebulizer every 6 hours  aspirin enteric coated 81 milliGRAM(s) Oral daily  atovaquone Suspension 1500 milliGRAM(s) Oral daily  Calcium Citrate + Vit D, 315 mg/200 Unit 2 Tablet(s) 2 Tablet(s) Oral two times a day  cefepime  IVPB      cefepime  IVPB 2000 milliGRAM(s) IV Intermittent every 8 hours  diltiazem    milliGRAM(s) Oral daily  docusate sodium 100 milliGRAM(s) Oral three times a day  enoxaparin Injectable 80 milliGRAM(s) SubCutaneous every 12 hours  insulin lispro (HumaLOG) corrective regimen sliding scale   SubCutaneous three times a day before meals  insulin lispro (HumaLOG) corrective regimen sliding scale   SubCutaneous at bedtime  multivitamin 1 Tablet(s) Oral daily  mycophenolate mofetil 1000 milliGRAM(s) Oral <User Schedule>  nystatin    Suspension 047569 Unit(s) Oral every 6 hours  pantoprazole    Tablet 40 milliGRAM(s) Oral before breakfast  pravastatin 20 milliGRAM(s) Oral at bedtime  predniSONE   Tablet 12.5 milliGRAM(s) Oral <User Schedule>  silver sulfADIAZINE 1% Cream 1 Application(s) Topical two times a day  tacrolimus 8 milliGRAM(s) Oral <User Schedule>  valGANciclovir 450 milliGRAM(s) Oral every 12 hours  vancomycin  IVPB 1000 milliGRAM(s) IV Intermittent every 12 hours                    Physical Therapy Rec:   Home  [  ]   Home w/ PT  [  ]  Rehab  [  ]  Discussed with Cardiothoracic Team at AM rounds.

## 2018-03-13 NOTE — PROGRESS NOTE ADULT - ASSESSMENT
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 Underwent biopsy #1 which showed CMR 1R, AMR 1 (no cellular injury). Repeat DSA negative. Plan for next EMB on Wednesday 3/14.    #Hyperkalemia  - Now improved with K 4.6 this morning.  - Changed bactrim to Mepron due to hyperkalemia.  - low K diet    #Hypertension  - Well controlled with -139  - Continue Diltiazem 120mg CD    # Immunosuppression prophylaxis:  Tacrolimus - started on prograf 2/25; now on 8mg BID please check levels at 7:30 am (stat). Level today 10.9 with goal 12-14. May need to increase PM dose as levels downtrending.  Continue CellCept 1000 mg PO BID  Steroids - continue prednisone 12.5mg PO BID until next biopsy  Please start mag oxide 400mg PO TID as magnesium noted to be 1.6 on 3/12  	  # Antimicrobial prophylaxis:  Intermediate risk CMV - on oral valganciclovir 450 mg q12  Intermediate risk Toxo - on Mepron  PCP - on Mepron    # Graft function:  Plan for EMB Wed 3/14.  Last TTE with RV systolic dysfunction but improved since previous    # Other prophylaxis:  - calcium citrate + vitamin D 45mg/200 units 2 tabs PO BID  - multivitamin daily  - Stress ulcer ppx while on steriods: protonix to 40mg PO daily    # Bilateral IJ/Subclavian thrombi  - Please hold lovenox 3/13 PM and 3/14 AM in preparation for RHC  - HIT Ab positive; CHERIE negative; lovenox 80mg subcut Q12, per vasc cards will need AC for at least 3 months  - appreciate vasc sx/card f/u  - Repeat BUE venous dopplers done 3/12, noted above    # CAV PPx  - Continue enteric coated ASA 81mg PO daily  - Continue pravastatin 20mg QHS    # ID   - afebrile, WBC downtrending today  - on cefepime for pseudomonas in sputum  - on vanc for drainage from old vad drive line site  - silver sulfadizaine dressings per plastics for R 3rd digit.   - HCV viral load and PCR to be sent per ID  - ID following    #endo  - BG well controlled ranging   - SSI

## 2018-03-13 NOTE — PROGRESS NOTE ADULT - ASSESSMENT
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 Patient on broad federica-operative prophylaxis to Vancomycin/Meropenem/Fluconazole based upon his prior LVAD placement and prolonged hospitalization pre-transplant. Intermediate risk for CMV (seropositive); intermediate risk for toxoplasmosis (seropositive R). Improved aeration today at right lung base. He had Significant amount of sero-sanguinous drainage from old LVAD exit site but cultures are NGTD. WBC trending down    - Plan:  Continue OI prophylaxis with Valganciclovir, Nystatin, Bactrim changed to Mepron by transplant team for PCP prophylaxis because of elevated potassium.    Receiving Cefepime 2gm q8hr for RLL atelectasis/?pneumonia with CR pseudomonas in sputum   Placed on IV Vancomycin 1 gram q 12hr given increasing WBC and drainage   check Vanco trough acceptable  clinically looks well without signs or symptoms of pna  except for productive cough  his finger is of concern but the site does look more like crystal disease than infection  to continue present antibiotics  send another HCV viral load PCR test- quantitative    Gary Lujan MD  [phone]  After 5pm/weekends [phone]

## 2018-03-13 NOTE — PROGRESS NOTE ADULT - ATTENDING COMMENTS
He is doing really well. No complaints. Leukocytosis is improving.  On cefepime for pseudomonas in sputum and vancomycin for DLES drainage. Afebrile.  On Lovenox 80 mg SC BID for BUE DVT.     Tacrolimus trough level has been up/down for last few days with steady dosing of 8 mg BID. Today it is further down at 10.9.  Given mild hypertension still, would increase Cardizem CD to 180 mg po daily in AM and give one dose of 60 mg po short-acting cardizem tonight. This will bump the tacro trough level so will not change the dose.   On CellCept 1 gm PO BID and prednisone 12.5 mg po BID until next biopsy is resulted. EMB tomorrow morning.    Please start ECASA 81 mg daily, Pravachol 20 mg po QHS, change protonix to 40 mg po daily, add MVI, and add Citracal +D 2 tabs po BID.   Discharge planning for later this week. He is doing really well. No complaints. Leukocytosis is improving.  On cefepime for pseudomonas in sputum and vancomycin for DLES drainage. Afebrile.  On Lovenox 80 mg SC BID for BUE DVT.     Tacrolimus trough level has been up/down for last few days with steady dosing of 8 mg BID. Today it is further down at 10.9.  Given mild hypertension, would increase Cardizem CD to 180 mg po daily in AM and give one dose of 60 mg po short-acting cardizem tonight. This will bump the tacro trough level so will not change the dose.   On CellCept 1 gm PO BID and prednisone 12.5 mg po BID until next biopsy is resulted. EMB tomorrow morning.  Seems to be tired still and needs to ambulate more.  Discharge planning for possibly Friday or Monday.

## 2018-03-13 NOTE — PROGRESS NOTE ADULT - SUBJECTIVE AND OBJECTIVE BOX
Learner: Patient  Barriers: the patient is not able to read.     Patient received a cardiac transplant.     Method used: Verbal discussion and photos of medications (cards)    Medication safety was discussed with the patient: allergies, herbals, adherence, medication precautions, miss dose instructions, purpose, side effects, signs and symptoms to report, and storage & handling.    Patient was able to repeat and verbalize key points. The patient was able to identify the tablets, what medications he takes, how often and how many. He understands the purpose of each medication (for instance, enoxaparin for "clots", Prograf is for "rejection" while  Valcyte is for "prevention of infection").     Education Summary: Discharge immunosuppressant medications and prophylatic anti-infective agents reviewed with the patient. Outpatient medication schedule was discussed in detail including: medication name, indication, dose, administration times, treatment duration, side effects, drug interactions, and special instructions. Patient questions and concerns were answered and addressed. Patient demonstrated understanding.    Time spent discharge education: 60 minutes    MEDICATIONS  (STANDING):  ALBUTerol/ipratropium for Nebulization 3 milliLiter(s) Nebulizer every 6 hours  aspirin enteric coated 81 milliGRAM(s) Oral daily  atovaquone Suspension 1500 milliGRAM(s) Oral daily  Calcium Citrate + Vit D, 315 mg/200 Unit 2 Tablet(s) 2 Tablet(s) Oral two times a day  cefepime  IVPB      cefepime  IVPB 2000 milliGRAM(s) IV Intermittent every 8 hours  docusate sodium 100 milliGRAM(s) Oral three times a day  insulin lispro (HumaLOG) corrective regimen sliding scale   SubCutaneous three times a day before meals  insulin lispro (HumaLOG) corrective regimen sliding scale   SubCutaneous at bedtime  magnesium oxide 400 milliGRAM(s) Oral three times a day with meals  multivitamin 1 Tablet(s) Oral daily  mycophenolate mofetil 1000 milliGRAM(s) Oral <User Schedule>  nystatin    Suspension 492038 Unit(s) Oral every 6 hours  pantoprazole    Tablet 40 milliGRAM(s) Oral before breakfast  pravastatin 20 milliGRAM(s) Oral at bedtime  predniSONE   Tablet 12.5 milliGRAM(s) Oral <User Schedule>  silver sulfADIAZINE 1% Cream 1 Application(s) Topical two times a day  tacrolimus 8 milliGRAM(s) Oral <User Schedule>  valGANciclovir 450 milliGRAM(s) Oral every 12 hours  vancomycin  IVPB 1000 milliGRAM(s) IV Intermittent every 12 hours    MEDICATIONS  (PRN):  acetaminophen   Tablet. 650 milliGRAM(s) Oral every 6 hours PRN Mild Pain (1 - 3)      Cardiac Transplant Medications:  Tacrolimus adjusted to trough (current dose is 8 mg PO twice a day; the patient is also on diltiazem 180 mg PO daily)  Mycophenolate mofetil 1,000 mg PO twice a day  Prednisone taper (current dose is 12.5 mg PO twice a day)  Atovaquone 1,500 mg PO daily  Nystatin 500,000 units 5 mL swish and swallow four times a day  Valganciclovir 450 mg 1 Tablet PO twice a day  Docusate and Senna  Pantoprazole 40 mg 1 Tablet PO Daily (in the morning)                          8.9    22.6  )-----------( 356      ( 13 Mar 2018 07:07 )             27.1     03-13    135  |  106  |  25<H>  ----------------------------<  136<H>  4.6   |  18<L>  |  1.10    Ca    8.9      13 Mar 2018 07:07  Mg     1.6     03-12    TPro  6.6  /  Alb  3.2<L>  /  TBili  0.4  /  DBili  x   /  AST  25  /  ALT  29  /  AlkPhos  89  03-12    LIVER FUNCTIONS - ( 12 Mar 2018 16:44 )  Alb: 3.2 g/dL / Pro: 6.6 g/dL / ALK PHOS: 89 U/L / ALT: 29 U/L RC / AST: 25 U/L / GGT: x           Tacrolimus (), Serum: 10.9 ng/mL (03-13-18 @ 08:03)  Vancomycin Level, Trough: 13.0 ug/mL (03-12-18 @ 16:44)  Tacrolimus (), Serum: 11.5 ng/mL (03-12-18 @ 09:03)  Vancomycin Level, Trough: 15.7 ug/mL (03-11-18 @ 17:59)  Tacrolimus (), Serum: 14.2 ng/mL (03-11-18 @ 08:06)

## 2018-03-13 NOTE — PROGRESS NOTE ADULT - ASSESSMENT
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     Upper extremity DVT  - Continue Lovenox 1mg/kg BID   - elevate arms above heart level and continue with light compression to R arm with 4 inch ace wrap from hand to axilla.    - repeat duplex reviewed.  On the right he has improved IJ thrombosis.  The bilateral innonimates remain occluded.  The Left seems to have have new thrombus - likely due to peripheral venous catheter  - no change to lovenox dose.    - minimize time off a/c          [place]  [id number]

## 2018-03-13 NOTE — PROGRESS NOTE ADULT - SUBJECTIVE AND OBJECTIVE BOX
Subjective: Feeling well today. Notes serous drainage from 3rd right digit, denies pain. Denies fever, chills. Ambulated around the unit without LOBATO. Denies SOB at rest, CP, palpitations, lightheadedness, dizziness, orthopnea, PND.     Medications:  acetaminophen   Tablet. 650 milliGRAM(s) Oral every 6 hours PRN  ALBUTerol/ipratropium for Nebulization 3 milliLiter(s) Nebulizer every 6 hours  aspirin enteric coated 81 milliGRAM(s) Oral daily  atovaquone Suspension 1500 milliGRAM(s) Oral daily  Calcium Citrate + Vit D, 315 mg/200 Unit 2 Tablet(s) 2 Tablet(s) Oral two times a day  cefepime  IVPB      cefepime  IVPB 2000 milliGRAM(s) IV Intermittent every 8 hours  diltiazem    milliGRAM(s) Oral daily  docusate sodium 100 milliGRAM(s) Oral three times a day  enoxaparin Injectable 80 milliGRAM(s) SubCutaneous every 12 hours  insulin lispro (HumaLOG) corrective regimen sliding scale   SubCutaneous three times a day before meals  insulin lispro (HumaLOG) corrective regimen sliding scale   SubCutaneous at bedtime  multivitamin 1 Tablet(s) Oral daily  mycophenolate mofetil 1000 milliGRAM(s) Oral <User Schedule>  nystatin    Suspension 219705 Unit(s) Oral every 6 hours  pantoprazole    Tablet 40 milliGRAM(s) Oral before breakfast  pravastatin 20 milliGRAM(s) Oral at bedtime  predniSONE   Tablet 12.5 milliGRAM(s) Oral <User Schedule>  silver sulfADIAZINE 1% Cream 1 Application(s) Topical two times a day  tacrolimus 8 milliGRAM(s) Oral <User Schedule>  valGANciclovir 450 milliGRAM(s) Oral every 12 hours  vancomycin  IVPB 1000 milliGRAM(s) IV Intermittent every 12 hours      Physical Exam:    Vitals:  Vital Signs Last 24 Hours  T(C): 36.8 (18 @ 10:14), Max: 36.8 (18 @ 10:14)  HR: 101 (18 @ 10:14) (96 - 101)  BP: 125/65 (18 @ 10:14) (124/90 - 137/78)  RR: 18 (18 @ 10:14) (18 - 18)  SpO2: 97% (18 @ 10:14) (93% - 99%)    Weight in k.4 ( @ 20:17)    I&O's Summary    12 Mar 2018 07:  -  13 Mar 2018 07:00  --------------------------------------------------------  IN: 2310 mL / OUT: 1450 mL / NET: 860 mL    13 Mar 2018 07:01  -  13 Mar 2018 11:25  --------------------------------------------------------  IN: 360 mL / OUT: 300 mL / NET: 60 mL      Tele: -  HR     General: OOB in chair. No distress. Comfortable.  HEENT: Facial/neck swelling improved.   Neck: Neck supple. JVP < 6cm H2O.  Chest: Clear to auscultation bilaterally  CV: RRR, Normal S1 and S2. No murmurs, rub, or gallops. +2 radial pulses bilaterally. No LE edema.   Abdomen: Soft, non-distended, non-tender, + BS  Skin: RLQ wound vac in place without drainage. Sternal incision with steristrips, small area of superficial dehiscence at top of sternal incision, no drainage, remainder of sternal incision well approximated with steristrips in place. No erythema or draiange noted.   Neurology: Alert and oriented times three. Sensation intact  Psych: Affect normal    Labs:                        8.9    22.6  )-----------( 356      ( 13 Mar 2018 07:07 )             27.1     -13    135  |  106  |  25<H>  ----------------------------<  136<H>  4.6   |  18<L>  |  1.10    Ca    8.9      13 Mar 2018 07:07  Mg     1.6     12    TPro  6.6  /  Alb  3.2<L>  /  TBili  0.4  /  DBili  x   /  AST  25  /  ALT  29  /  AlkPhos  89  03-12    Tacrolimus (), Serum (18 @ 08:03)    Tacrolimus (), Serum: 10.9    Tacrolimus (), Serum (18 @ 09:03)    Tacrolimus (), Serum: 11.5    Tacrolimus (), Serum (18 @ 08:06)    Tacrolimus (), Serum: 14.2    < from: [facility] Duplex Upper Ext Vein Scan, Bilat (18 @ 19:53) >  IMPRESSION:     Nonocclusive DVT of the right internal jugular vein. Persistent occlusive   DVT in the innominate and subclavian veins. Superficial thrombosis of the   right cephalic vein.    Persistent occlusive DVT of the left subclavian vein. Nonocclusive DVT of   the left internal jugular and innominate veins. Nonocclusive DVT of the   axillary and brachial vein surrounding a left PICC.    Fluid collections within the right medial chest wall and right medial   upper extremity.

## 2018-03-13 NOTE — PROGRESS NOTE ADULT - ASSESSMENT
67 yr old male s/p  Heart transplant 2/23/18 for HF   H; GI bleed, LVAD implant, afib, anemia AICD  Intra op acute rejection of graft and Intraop IABP placed  and removed POD 1 and received plasmaphoresis x 2  2/26 UE +RT IJ inominate SC,cephalic DVT and +lt SC DVT  3/1  cardiac bx low grade rejection  3/2 bronchoscopy   neg   3/4  trf too floor,  Imipenum for low grade fevers  3/5 Sputum + PSA, ID to see pt  Vac changed to RLQ  3/6 Pt on cefepime  Remains on lovenox, planning on biopsy thursday, will need to hold   3/8 Cardiac biopsy this am - prograf level - 7- prograf level increased to 8mg po bid  toprol d/c'd,  Cardizem CD 120mg po initiated  hi filling pressures noted on right heart cath-lasix 40mg po daily started as per HF  K+= 6 - Bactrim d/c'd - Mepron to start in am for prophylaxis  rpt k+ = 5.2-  daily prograf levels, daily CXR, bid CMP & CBC  plastics consult for ?necrotic right 3rd digit  ** if SBP remains >120-130 will increase CardizemCD to 180mg po daily tomorrow 3/9  resume lvx 80mg sq bid tonight  Cardiac biopsy results pending- depending on biopsy results - HF to adjust prednisone taper  d/c planning  3/9 prednisone 12.5 bid.  Lasix x 1 in am tomorrow, then d/c.   Prograf 10.4, level increased from 7.9 so will need a prograf level tonight at 7 pm, Dr. Mccabe to be notified.  K elevated , if continues to rise may need  kaexalate  Finger with  possible necrosis 3rd R.  Tophus noted by rheumatology,+ crystals  specimen sent.  Hand /plastics consulted, finger  drained, may need further intervention  3/10  WBC to 29   afebrile,  ct abdomen  chest pelvis ordered,   Prograf level 11,  vss   3/11 wbc trending down.  CT chest abd pelvis :  < from: CT Chest No Cont (03.10.18 @ 15:47) >  IMPRESSION:     Small fluid collection inferior to the xiphoid process likely the site of   prior drain.    7 mm nodule in the lingula, new from prior imaging.    No evidence of infection in the abdomen or pelvis.    < end of copied text >  Hct down , if  decreases further on repeat labs , PRBC to be given  Finger  with less discomfort, no drainage today unable to culture.  Minimal drainage after it was drained on  3/9, culture not sent during   BC 3/9 neg to date  UA neg  Driveline site  cx negative from 3/5  WBC trending down  Tacro leve 14.2, D/W hf , MAINTAIN CURRENT DOSAGE  Cont IV abx  Cont lovenox for dvt  3/12 Pt with decreased hct, 1uprbc being given.  Plastics f/u , would use silvadine  on finger, no further intervention at this time  3/13 The pt ambulated on stairs.  To d/w ID the course of antibiotics  Prograf level 10  Repeat duplex: < from: [facility] Duplex Upper Ext Vein Scan, Bilat (03.12.18 @ 19:53) >  Nonocclusive DVT of the right internal jugular vein. Persistent occlusive   DVT in the innominate and subclavian veins. Superficial thrombosis of the   right cephalic vein.    Persistent occlusive DVT of the left subclavian vein. Nonocclusive DVT of   the left internal jugular and innominate veins. Nonocclusive DVT of the   axillary and brachial vein surrounding a left PICC.    Fluid collections within the right medial chest wall and right medial   upper extremity.    < end of copied text >    Studies reviewed w Dr Tavarez, will continue lvx 80 bid  d/c planning

## 2018-03-14 ENCOUNTER — RESULT REVIEW (OUTPATIENT)
Age: 68
End: 2018-03-14

## 2018-03-14 LAB
ANION GAP SERPL CALC-SCNC: 11 MMOL/L — SIGNIFICANT CHANGE UP (ref 5–17)
ANION GAP SERPL CALC-SCNC: 15 MMOL/L — SIGNIFICANT CHANGE UP (ref 5–17)
BUN SERPL-MCNC: 27 MG/DL — HIGH (ref 7–23)
BUN SERPL-MCNC: 27 MG/DL — HIGH (ref 7–23)
CALCIUM SERPL-MCNC: 8.9 MG/DL — SIGNIFICANT CHANGE UP (ref 8.4–10.5)
CALCIUM SERPL-MCNC: 9.2 MG/DL — SIGNIFICANT CHANGE UP (ref 8.4–10.5)
CHLORIDE SERPL-SCNC: 108 MMOL/L — SIGNIFICANT CHANGE UP (ref 96–108)
CHLORIDE SERPL-SCNC: 108 MMOL/L — SIGNIFICANT CHANGE UP (ref 96–108)
CO2 SERPL-SCNC: 14 MMOL/L — LOW (ref 22–31)
CO2 SERPL-SCNC: 18 MMOL/L — LOW (ref 22–31)
CREAT SERPL-MCNC: 1.25 MG/DL — SIGNIFICANT CHANGE UP (ref 0.5–1.3)
CREAT SERPL-MCNC: 1.29 MG/DL — SIGNIFICANT CHANGE UP (ref 0.5–1.3)
CULTURE RESULTS: SIGNIFICANT CHANGE UP
CULTURE RESULTS: SIGNIFICANT CHANGE UP
GLUCOSE BLDC GLUCOMTR-MCNC: 135 MG/DL — HIGH (ref 70–99)
GLUCOSE BLDC GLUCOMTR-MCNC: 148 MG/DL — HIGH (ref 70–99)
GLUCOSE BLDC GLUCOMTR-MCNC: 97 MG/DL — SIGNIFICANT CHANGE UP (ref 70–99)
GLUCOSE BLDC GLUCOMTR-MCNC: 99 MG/DL — SIGNIFICANT CHANGE UP (ref 70–99)
GLUCOSE SERPL-MCNC: 109 MG/DL — HIGH (ref 70–99)
GLUCOSE SERPL-MCNC: 110 MG/DL — HIGH (ref 70–99)
HCT VFR BLD CALC: 31.6 % — LOW (ref 39–50)
HGB BLD-MCNC: 10.4 G/DL — LOW (ref 13–17)
MAGNESIUM SERPL-MCNC: 1.6 MG/DL — SIGNIFICANT CHANGE UP (ref 1.6–2.6)
MCHC RBC-ENTMCNC: 31 PG — SIGNIFICANT CHANGE UP (ref 27–34)
MCHC RBC-ENTMCNC: 32.8 GM/DL — SIGNIFICANT CHANGE UP (ref 32–36)
MCV RBC AUTO: 94.4 FL — SIGNIFICANT CHANGE UP (ref 80–100)
PHOSPHATE SERPL-MCNC: 3.2 MG/DL — SIGNIFICANT CHANGE UP (ref 2.5–4.5)
PLATELET # BLD AUTO: 400 K/UL — SIGNIFICANT CHANGE UP (ref 150–400)
POTASSIUM SERPL-MCNC: 5.3 MMOL/L — SIGNIFICANT CHANGE UP (ref 3.5–5.3)
POTASSIUM SERPL-MCNC: 5.6 MMOL/L — HIGH (ref 3.5–5.3)
POTASSIUM SERPL-SCNC: 5.3 MMOL/L — SIGNIFICANT CHANGE UP (ref 3.5–5.3)
POTASSIUM SERPL-SCNC: 5.6 MMOL/L — HIGH (ref 3.5–5.3)
RBC # BLD: 3.34 M/UL — LOW (ref 4.2–5.8)
RBC # FLD: 17.2 % — HIGH (ref 10.3–14.5)
SODIUM SERPL-SCNC: 137 MMOL/L — SIGNIFICANT CHANGE UP (ref 135–145)
SODIUM SERPL-SCNC: 137 MMOL/L — SIGNIFICANT CHANGE UP (ref 135–145)
SPECIMEN SOURCE: SIGNIFICANT CHANGE UP
SPECIMEN SOURCE: SIGNIFICANT CHANGE UP
TACROLIMUS SERPL-MCNC: 12 NG/ML — SIGNIFICANT CHANGE UP
WBC # BLD: 27.5 K/UL — HIGH (ref 3.8–10.5)
WBC # FLD AUTO: 27.5 K/UL — HIGH (ref 3.8–10.5)

## 2018-03-14 PROCEDURE — 93505 ENDOMYOCARDIAL BIOPSY: CPT | Mod: 26

## 2018-03-14 PROCEDURE — 93451 RIGHT HEART CATH: CPT | Mod: 26,59

## 2018-03-14 PROCEDURE — 88341 IMHCHEM/IMCYTCHM EA ADD ANTB: CPT | Mod: 26

## 2018-03-14 PROCEDURE — 88350 IMFLUOR EA ADDL 1ANTB STN PX: CPT | Mod: 26

## 2018-03-14 PROCEDURE — 99233 SBSQ HOSP IP/OBS HIGH 50: CPT

## 2018-03-14 PROCEDURE — 88313 SPECIAL STAINS GROUP 2: CPT | Mod: 26

## 2018-03-14 PROCEDURE — 31624 DX BRONCHOSCOPE/LAVAGE: CPT

## 2018-03-14 PROCEDURE — 88307 TISSUE EXAM BY PATHOLOGIST: CPT | Mod: 26

## 2018-03-14 PROCEDURE — 88342 IMHCHEM/IMCYTCHM 1ST ANTB: CPT | Mod: 26

## 2018-03-14 PROCEDURE — 88346 IMFLUOR 1ST 1ANTB STAIN PX: CPT | Mod: 26

## 2018-03-14 PROCEDURE — 71045 X-RAY EXAM CHEST 1 VIEW: CPT | Mod: 26,77

## 2018-03-14 PROCEDURE — 99232 SBSQ HOSP IP/OBS MODERATE 35: CPT

## 2018-03-14 PROCEDURE — 71045 X-RAY EXAM CHEST 1 VIEW: CPT | Mod: 26

## 2018-03-14 RX ORDER — ENOXAPARIN SODIUM 100 MG/ML
80 INJECTION SUBCUTANEOUS
Qty: 0 | Refills: 0 | Status: DISCONTINUED | OUTPATIENT
Start: 2018-03-14 | End: 2018-03-17

## 2018-03-14 RX ORDER — LIDOCAINE HCL 20 MG/ML
5 VIAL (ML) INJECTION ONCE
Qty: 0 | Refills: 0 | Status: COMPLETED | OUTPATIENT
Start: 2018-03-14 | End: 2018-03-14

## 2018-03-14 RX ADMIN — Medication 20 MILLIGRAM(S): at 22:28

## 2018-03-14 RX ADMIN — Medication 3 MILLILITER(S): at 01:00

## 2018-03-14 RX ADMIN — PANTOPRAZOLE SODIUM 40 MILLIGRAM(S): 20 TABLET, DELAYED RELEASE ORAL at 05:35

## 2018-03-14 RX ADMIN — VALGANCICLOVIR 450 MILLIGRAM(S): 450 TABLET, FILM COATED ORAL at 19:57

## 2018-03-14 RX ADMIN — Medication 1 TABLET(S): at 15:23

## 2018-03-14 RX ADMIN — MAGNESIUM OXIDE 400 MG ORAL TABLET 400 MILLIGRAM(S): 241.3 TABLET ORAL at 17:21

## 2018-03-14 RX ADMIN — CEFEPIME 100 MILLIGRAM(S): 1 INJECTION, POWDER, FOR SOLUTION INTRAMUSCULAR; INTRAVENOUS at 15:21

## 2018-03-14 RX ADMIN — Medication 180 MILLIGRAM(S): at 05:32

## 2018-03-14 RX ADMIN — Medication 250 MILLIGRAM(S): at 05:30

## 2018-03-14 RX ADMIN — Medication 12.5 MILLIGRAM(S): at 08:04

## 2018-03-14 RX ADMIN — TACROLIMUS 8 MILLIGRAM(S): 5 CAPSULE ORAL at 19:57

## 2018-03-14 RX ADMIN — Medication 3 MILLILITER(S): at 11:37

## 2018-03-14 RX ADMIN — MYCOPHENOLATE MOFETIL 1000 MILLIGRAM(S): 250 CAPSULE ORAL at 08:03

## 2018-03-14 RX ADMIN — Medication 650 MILLIGRAM(S): at 20:32

## 2018-03-14 RX ADMIN — VALGANCICLOVIR 450 MILLIGRAM(S): 450 TABLET, FILM COATED ORAL at 08:03

## 2018-03-14 RX ADMIN — Medication 5 MILLILITER(S): at 14:00

## 2018-03-14 RX ADMIN — MYCOPHENOLATE MOFETIL 1000 MILLIGRAM(S): 250 CAPSULE ORAL at 19:57

## 2018-03-14 RX ADMIN — Medication 1 APPLICATION(S): at 17:22

## 2018-03-14 RX ADMIN — Medication 500000 UNIT(S): at 02:14

## 2018-03-14 RX ADMIN — TACROLIMUS 8 MILLIGRAM(S): 5 CAPSULE ORAL at 08:02

## 2018-03-14 RX ADMIN — Medication 3 MILLILITER(S): at 05:31

## 2018-03-14 RX ADMIN — Medication 81 MILLIGRAM(S): at 15:23

## 2018-03-14 RX ADMIN — CEFEPIME 100 MILLIGRAM(S): 1 INJECTION, POWDER, FOR SOLUTION INTRAMUSCULAR; INTRAVENOUS at 05:30

## 2018-03-14 RX ADMIN — Medication 1 APPLICATION(S): at 05:33

## 2018-03-14 RX ADMIN — CEFEPIME 100 MILLIGRAM(S): 1 INJECTION, POWDER, FOR SOLUTION INTRAMUSCULAR; INTRAVENOUS at 22:27

## 2018-03-14 RX ADMIN — Medication 650 MILLIGRAM(S): at 20:02

## 2018-03-14 RX ADMIN — ATOVAQUONE 1500 MILLIGRAM(S): 750 SUSPENSION ORAL at 15:23

## 2018-03-14 RX ADMIN — Medication 10 MILLIGRAM(S): at 19:57

## 2018-03-14 RX ADMIN — Medication 500000 UNIT(S): at 23:45

## 2018-03-14 RX ADMIN — Medication 250 MILLIGRAM(S): at 17:22

## 2018-03-14 RX ADMIN — Medication 3 MILLILITER(S): at 23:45

## 2018-03-14 RX ADMIN — Medication 500000 UNIT(S): at 17:21

## 2018-03-14 RX ADMIN — ENOXAPARIN SODIUM 80 MILLIGRAM(S): 100 INJECTION SUBCUTANEOUS at 22:28

## 2018-03-14 RX ADMIN — Medication 500000 UNIT(S): at 05:32

## 2018-03-14 NOTE — PROGRESS NOTE ADULT - ASSESSMENT
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 Underwent biopsy #1 which showed CMR 1R, AMR 1 (no cellular injury). Repeat DSA negative.  EMB today with Dr Campbell      #Hyperkalemia  - K 5.3  this morning.  - Changed bactrim to Mepron due to hyperkalemia.  - low K diet    #Hypertension  - Well controlled with -139  - Continue Diltiazem 180 mg daily     # Immunosuppression prophylaxis:  Tacrolimus - started on prograf 2/25; n   8mg BID  check levels daily  with goal 12-14.   Continue CellCept 1000 mg PO BID  Steroids - continue prednisone 12.5mg PO BID  biopsey today   mag oxide 400mg PO TID as magnesium noted to be 1.6 on 3/12 check level   	  # Antimicrobial prophylaxis:  Intermediate risk CMV - on oral valganciclovir 450 mg q12  Intermediate risk Toxo - on Mepron  PCP - on Mepron    # Graft function:  Plan for EMB   Last TTE with RV systolic dysfunction but improved since previous    # Other prophylaxis:  - calcium citrate + vitamin D 45mg/200 units 2 tabs PO BID  - multivitamin daily  - Stress ulcer ppx while on steriods: protonix to 40mg PO daily    # Bilateral IJ/Subclavian thrombi  - Off lovenox 3/13 PM and 3/14 AM in preparation for RHC  - Restart   - 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 Underwent biopsy #1 which showed CMR 1R, AMR 1 (no cellular injury). Repeat DSA negative.  EMB today with Dr Campbell    C RA 14 PA 40/16/27wedge 11Pasat 65%         #Hyperkalemia  - K 5.3  this morning.  - Changed bactrim to Mepron due to hyperkalemia.  - low K diet    #Hypertension  - Well controlled with -139  - Continue Diltiazem 180 mg daily     # Immunosuppression prophylaxis:  Tacrolimus - started on prograf 2/25; n   8mg BID  check levels daily  with goal 12-14.   Continue CellCept 1000 mg PO BID  Steroids - continue prednisone 12.5mg PO BID  biopsy today   mag oxide 400mg PO TID as magnesium noted to be 1.6 on 3/12 check level   	  # Antimicrobial prophylaxis:  Intermediate risk CMV - on oral valganciclovir 450 mg q12  Intermediate risk Toxo - on Mepron  PCP - on Mepron    # Graft function:  Plan for EMB   Last TTE with RV systolic dysfunction but improved since previous    # Other prophylaxis:  - calcium citrate + vitamin D 45mg/200 units 2 tabs PO BID  - multivitamin daily  - Stress ulcer ppx while on steriods: protonix to 40mg PO daily    # Bilateral IJ/Subclavian thrombi  - Off lovenox 3/13 PM and 3/14 AM in preparation for RHC  - Restart   - 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   - HCV viral load and PCR to be sent per ID  - ID following    #endo  - BG well controlled ranging   - SSI

## 2018-03-14 NOTE — PROVIDER CONTACT NOTE (OTHER) - ASSESSMENT
pt asymptomatic MAP 98. last K 4.5. LVAD WDL
No ectopy observed. Asymptomatic
pt awake, alert, BP stable. Low-dose precedex infusion initiated 0945 for agitation. Infusion off at 1020 for HR of 80
pt resting in bed during episode, no s/s VS stable, MAP 87, HR 91, 96% RA, 18 RR, K 4.3

## 2018-03-14 NOTE — PROGRESS NOTE ADULT - SUBJECTIVE AND OBJECTIVE BOX
INFECTIOUS DISEASES FOLLOW UP-- Sujey Lujan  [phone]    This is a follow up note for this  67yMale with s/p OHTx on 2/23 doing well but with persistent leukocytosis      ROS:  CONSTITUTIONAL:  No fever, good appetite  CARDIOVASCULAR:  No chest pain or palpitations  RESPIRATORY:  No dyspnea  GASTROINTESTINAL:  No nausea, vomiting, diarrhea, or abdominal pain  GENITOURINARY:  No dysuria  NEUROLOGIC:  No headache,     Allergies    No Known Allergies    Intolerances        ANTIBIOTICS/RELEVANT:  antimicrobials  atovaquone Suspension 1500 milliGRAM(s) Oral daily  cefepime  IVPB      cefepime  IVPB 2000 milliGRAM(s) IV Intermittent every 8 hours  nystatin    Suspension 353286 Unit(s) Oral every 6 hours  valGANciclovir 450 milliGRAM(s) Oral every 12 hours  vancomycin  IVPB 1000 milliGRAM(s) IV Intermittent every 12 hours    immunologic:  mycophenolate mofetil 1000 milliGRAM(s) Oral <User Schedule>  tacrolimus 8 milliGRAM(s) Oral <User Schedule>    OTHER:  acetaminophen   Tablet. 650 milliGRAM(s) Oral every 6 hours PRN  ALBUTerol/ipratropium for Nebulization 3 milliLiter(s) Nebulizer every 6 hours  aspirin enteric coated 81 milliGRAM(s) Oral daily  Calcium Citrate + Vit D, 315 mg/200 Unit 2 Tablet(s) 2 Tablet(s) Oral two times a day  diltiazem    milliGRAM(s) Oral daily  docusate sodium 100 milliGRAM(s) Oral three times a day  enoxaparin Injectable 80 milliGRAM(s) SubCutaneous <User Schedule>  insulin lispro (HumaLOG) corrective regimen sliding scale   SubCutaneous three times a day before meals  insulin lispro (HumaLOG) corrective regimen sliding scale   SubCutaneous at bedtime  magnesium oxide 400 milliGRAM(s) Oral three times a day with meals  multivitamin 1 Tablet(s) Oral daily  pantoprazole    Tablet 40 milliGRAM(s) Oral before breakfast  pravastatin 20 milliGRAM(s) Oral at bedtime  predniSONE   Tablet 12.5 milliGRAM(s) Oral <User Schedule>  silver sulfADIAZINE 1% Cream 1 Application(s) Topical two times a day      Objective:  Vital Signs Last 24 Hrs  T(C): 36.3 (14 Mar 2018 15:51), Max: 36.8 (14 Mar 2018 03:03)  T(F): 97.3 (14 Mar 2018 15:51), Max: 98.3 (14 Mar 2018 03:03)  HR: 92 (14 Mar 2018 15:51) (92 - 99)  BP: 131/73 (14 Mar 2018 15:51) (115/72 - 140/64)  BP(mean): 95 (14 Mar 2018 15:51) (94 - 95)  RR: 18 (14 Mar 2018 15:51) (15 - 18)  SpO2: 100% (14 Mar 2018 15:51) (99% - 100%)    PHYSICAL EXAM:  Constitutional:no acute distress, sleepy but arousable  Eyes:WALT, EOMI  Ear/Nose/Throat: no oral lesions, 	  Respiratory: clear BL with diminished sounds at right base  Cardiovascular: S1S2  old LVAD site with a wound vac in place  Gastrointestinal:soft, (+) BS, no tenderness  Extremities:no e/e/c  middle finger right hand with darkened discoloration  No Lymphadenopathy  IV sites not inflammed.    LABS:                        10.4   27.5  )-----------( 400      ( 14 Mar 2018 07:14 )             31.6     03-14    137  |  108  |  27<H>  ----------------------------<  109<H>  5.3   |  18<L>  |  1.29    Ca    8.9      14 Mar 2018 07:14  Phos  3.2     03-14  Mg     1.6     03-14            MICROBIOLOGY:            RECENT CULTURES:  03-11 @ 22:57  .Sputum Sputum  Pseudomonas aeruginosa (Carbapenem Resistant)  Pseudomonas aeruginosa (Carbapenem Resistant)  KAMILA    Few Pseudomonas aeruginosa (Carbapenem Resistant)  Normal Respiratory Heaven present  --  03-10 @ 14:32  .Urine Clean Catch (Midstream)  --  --  --    No growth  --  03-09 @ 22:00  .Blood Blood  --  --  --    No growth to date.  --  03-09 @ 13:03  .Blood Blood  --  --  --    No growth at 5 days.  --      RADIOLOGY & ADDITIONAL STUDIES:    < from: Xray Chest 1 View- PORTABLE-Routine (03.14.18 @ 05:28) >    IMPRESSION:  Persistent right lung base atelectasis. No interval change compared to   prior study.    < end of copied text >

## 2018-03-14 NOTE — PROGRESS NOTE ADULT - ASSESSMENT
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     Upper extremity DVT  - Continue Lovenox 1mg/kg BID   - elevate arms above heart level and continue with light compression to R arm with 4 inch ace wrap from hand to axilla.    - no change to lovenox dose.    - minimize time off a/c          [place]  [id number]

## 2018-03-14 NOTE — PROVIDER CONTACT NOTE (OTHER) - ACTION/TREATMENT ORDERED:
check BMP/ mg with next PTT draw at 2330
1 g Mag IVPB, will continue to monitor pt & notify provider for any changes
Provider aware. No orders at this time. continue to monitor
continue to monitor, keep precedex infusion off.

## 2018-03-14 NOTE — PROGRESS NOTE ADULT - SUBJECTIVE AND OBJECTIVE BOX
VITAL SIGNS    Telemetry:      Vital Signs Last 24 Hrs  T(C): 36.7 (03-14-18 @ 07:12), Max: 36.8 (03-13-18 @ 15:09)  T(F): 98 (03-14-18 @ 07:12), Max: 98.3 (03-14-18 @ 03:03)  HR: 96 (03-14-18 @ 07:12) (93 - 107)  BP: 115/72 (03-14-18 @ 07:12) (115/72 - 140/64)  RR: 16 (03-14-18 @ 07:12) (16 - 18)  SpO2: 100% (03-14-18 @ 07:12) (100% - 100%)                   03-13 @ 07:01  -  03-14 @ 07:00  --------------------------------------------------------  IN: 960 mL / OUT: 1050 mL / NET: -90 mL    03-14 @ 07:01  -  03-14 @ 11:34  --------------------------------------------------------  IN: 0 mL / OUT: 250 mL / NET: -250 mL          Daily     Daily         CAPILLARY BLOOD GLUCOSE      POCT Blood Glucose.: 148 mg/dL (14 Mar 2018 08:00)  POCT Blood Glucose.: 143 mg/dL (13 Mar 2018 22:03)  POCT Blood Glucose.: 111 mg/dL (13 Mar 2018 19:23)                  Coumadin    [ ] YES          [  ]      NO         Reason:                               PHYSICAL EXAM        Neurology: alert and oriented x 3, nonfocal, no gross deficits  CV :  Sternal Wound :  CDI , Stable  Pacing Wires        [  ]   Settings:                                  Isolated  [  ]  Lungs:  Drains:     MS         [  ] Drainage:                 L Pleural  [  ]  Drainage:                R Pleural  [  ]  Drainage:  Abdomen: soft, nontender, nondistended, positive bowel sounds, last bowel movement   :             Extremities:               acetaminophen   Tablet. 650 milliGRAM(s) Oral every 6 hours PRN  ALBUTerol/ipratropium for Nebulization 3 milliLiter(s) Nebulizer every 6 hours  aspirin enteric coated 81 milliGRAM(s) Oral daily  atovaquone Suspension 1500 milliGRAM(s) Oral daily  Calcium Citrate + Vit D, 315 mg/200 Unit 2 Tablet(s) 2 Tablet(s) Oral two times a day  cefepime  IVPB      cefepime  IVPB 2000 milliGRAM(s) IV Intermittent every 8 hours  diltiazem    milliGRAM(s) Oral daily  docusate sodium 100 milliGRAM(s) Oral three times a day  insulin lispro (HumaLOG) corrective regimen sliding scale   SubCutaneous three times a day before meals  insulin lispro (HumaLOG) corrective regimen sliding scale   SubCutaneous at bedtime  lidocaine 1% (Preservative-free) Injectable 5 milliLiter(s) Local Injection once  magnesium oxide 400 milliGRAM(s) Oral three times a day with meals  multivitamin 1 Tablet(s) Oral daily  mycophenolate mofetil 1000 milliGRAM(s) Oral <User Schedule>  nystatin    Suspension 529785 Unit(s) Oral every 6 hours  pantoprazole    Tablet 40 milliGRAM(s) Oral before breakfast  pravastatin 20 milliGRAM(s) Oral at bedtime  predniSONE   Tablet 12.5 milliGRAM(s) Oral <User Schedule>  silver sulfADIAZINE 1% Cream 1 Application(s) Topical two times a day  tacrolimus 8 milliGRAM(s) Oral <User Schedule>  valGANciclovir 450 milliGRAM(s) Oral every 12 hours  vancomycin  IVPB 1000 milliGRAM(s) IV Intermittent every 12 hours                    Physical Therapy Rec:   Home  [  ]   Home w/ PT  [  ]  Rehab  [  ]  Discussed with Cardiothoracic Team at AM rounds.

## 2018-03-14 NOTE — PROGRESS NOTE ADULT - ASSESSMENT
67 yr old male s/p  Heart transplant 2/23/18 for HF   H; GI bleed, LVAD implant, afib, anemia AICD  Intra op acute rejection of graft and Intraop IABP placed  and removed POD 1 and received plasmaphoresis x 2  2/26 UE +RT IJ inominate SC,cephalic DVT and +lt SC DVT  3/1  cardiac bx low grade rejection  3/2 bronchoscopy   neg   3/4  trf too floor,  Imipenum for low grade fevers  3/5 Sputum + PSA, ID to see pt  Vac changed to RLQ  3/6 Pt on cefepime  Remains on lovenox, planning on biopsy thursday, will need to hold   3/8 Cardiac biopsy this am - prograf level - 7- prograf level increased to 8mg po bid  toprol d/c'd,  Cardizem CD 120mg po initiated  hi filling pressures noted on right heart cath-lasix 40mg po daily started as per HF  K+= 6 - Bactrim d/c'd - Mepron to start in am for prophylaxis  rpt k+ = 5.2-  daily prograf levels, daily CXR, bid CMP & CBC  plastics consult for ?necrotic right 3rd digit  ** if SBP remains >120-130 will increase CardizemCD to 180mg po daily tomorrow 3/9  resume lvx 80mg sq bid tonight  Cardiac biopsy results pending- depending on biopsy results - HF to adjust prednisone taper  d/c planning  3/9 prednisone 12.5 bid.  Lasix x 1 in am tomorrow, then d/c.   Prograf 10.4, level increased from 7.9 so will need a prograf level tonight at 7 pm, Dr. Mccabe to be notified.  K elevated , if continues to rise may need  kaexalate  Finger with  possible necrosis 3rd R.  Tophus noted by rheumatology,+ crystals  specimen sent.  Hand /plastics consulted, finger  drained, may need further intervention  3/10  WBC to 29   afebrile,  ct abdomen  chest pelvis ordered,   Prograf level 11,  vss   3/11 wbc trending down.  CT chest abd pelvis :  < from: CT Chest No Cont (03.10.18 @ 15:47) >  IMPRESSION:     Small fluid collection inferior to the xiphoid process likely the site of   prior drain.    7 mm nodule in the lingula, new from prior imaging.    No evidence of infection in the abdomen or pelvis.    < end of copied text >  Hct down , if  decreases further on repeat labs , PRBC to be given  Finger  with less discomfort, no drainage today unable to culture.  Minimal drainage after it was drained on  3/9, culture not sent during   BC 3/9 neg to date  UA neg  Driveline site  cx negative from 3/5  WBC trending down  Tacro leve 14.2, D/W hf , MAINTAIN CURRENT DOSAGE  Cont IV abx  Cont lovenox for dvt  3/12 Pt with decreased hct, 1uprbc being given.  Plastics f/u , would use silvadine  on finger, no further intervention at this time  3/13 The pt ambulated on stairs.  To d/w ID the course of antibiotics  Prograf level 10  Repeat duplex: < from: [facility] Duplex Upper Ext Vein Scan, Bilat (03.12.18 @ 19:53) >  Nonocclusive DVT of the right internal jugular vein. Persistent occlusive   DVT in the innominate and subclavian veins. Superficial thrombosis of the   right cephalic vein.    Persistent occlusive DVT of the left subclavian vein. Nonocclusive DVT of   the left internal jugular and innominate veins. Nonocclusive DVT of the   axillary and brachial vein surrounding a left PICC.    Fluid collections within the right medial chest wall and right medial   upper extremity.    < end of copied text >    Studies reviewed w Dr Tavarez, will continue lvx 80 bid  d/c planning  3/14 the patient went for biopsy this am, he will need his lovenox resumed tonight.  RLL collapse with persistent wbc , for bronch today  with Dr Sutherland.  Prograff 12, diose remains at 8 bid,  bp 115/72- 125/77  with cardizem increased yesterday to 180

## 2018-03-14 NOTE — PROGRESS NOTE ADULT - SUBJECTIVE AND OBJECTIVE BOX
PAGER:  [phone]               [facility] [id number]              EMAIL [email or web address]   OFFICE [phone]                              ********VASCULAR MEDICINE PROGRESS NOTE********                        CC:  UE DVT    INTERVAL HISTORY:  s/p biopsy today.  s/p bronch today.  lovenox held federica-porcedure.  No complaints.      MEDICATIONS  (STANDING):  ALBUTerol/ipratropium for Nebulization 3 milliLiter(s) Nebulizer every 6 hours  aspirin enteric coated 81 milliGRAM(s) Oral daily  atovaquone Suspension 1500 milliGRAM(s) Oral daily  Calcium Citrate + Vit D, 315 mg/200 Unit 2 Tablet(s) 2 Tablet(s) Oral two times a day  cefepime  IVPB      cefepime  IVPB 2000 milliGRAM(s) IV Intermittent every 8 hours  diltiazem    milliGRAM(s) Oral daily  docusate sodium 100 milliGRAM(s) Oral three times a day  enoxaparin Injectable 80 milliGRAM(s) SubCutaneous <User Schedule>  insulin lispro (HumaLOG) corrective regimen sliding scale   SubCutaneous three times a day before meals  insulin lispro (HumaLOG) corrective regimen sliding scale   SubCutaneous at bedtime  magnesium oxide 400 milliGRAM(s) Oral three times a day with meals  multivitamin 1 Tablet(s) Oral daily  mycophenolate mofetil 1000 milliGRAM(s) Oral <User Schedule>  nystatin    Suspension [id number] Unit(s) Oral every 6 hours  pantoprazole    Tablet 40 milliGRAM(s) Oral before breakfast  pravastatin 20 milliGRAM(s) Oral at bedtime  predniSONE   Tablet 12.5 milliGRAM(s) Oral <User Schedule>  silver sulfADIAZINE 1% Cream 1 Application(s) Topical two times a day  tacrolimus 8 milliGRAM(s) Oral <User Schedule>  valGANciclovir 450 milliGRAM(s) Oral every 12 hours  vancomycin  IVPB 1000 milliGRAM(s) IV Intermittent every 12 hours    MEDICATIONS  (PRN):  acetaminophen   Tablet. 650 milliGRAM(s) Oral every 6 hours PRN Mild Pain (1 - 3)      FAMILY HISTORY:  No pertinent family history in first degree relatives      SOCIAL HISTORY:  unchanged    REVIEW OF SYSTEMS:  CONSTITUTIONAL: No fever, weight loss, or fatigue  EYES: No eye pain, visual disturbances, or discharge  ENMT:  No difficulty hearing, tinnitus, vertigo; No sinus or throat pain  NECK: No pain or stiffness  RESPIRATORY: No cough, wheezing, chills or hemoptysis; No Shortness of Breath  CARDIOVASCULAR: No chest pain, palpitations, passing out, dizziness, or leg swelling  GASTROINTESTINAL: No abdominal or epigastric pain. No nausea, vomiting, or hematemesis; No diarrhea or constipation. No melena or hematochezia.  GENITOURINARY: No dysuria, frequency, hematuria, or incontinence  NEUROLOGICAL: No headaches, memory loss, loss of strength, numbness, or tremors  SKIN: No itching, burning, rashes, or lesions   LYMPH Nodes: No enlarged glands  ENDOCRINE: No heat or cold intolerance; No hair loss  MUSCULOSKELETAL: No joint pain or swelling; No muscle, back, or extremity pain  PSYCHIATRIC: No depression, anxiety, mood swings, or difficulty sleeping  HEME/LYMPH: No easy bruising, or bleeding gums  ALLERY AND IMMUNOLOGIC: No hives or eczema	    [x ] All others negative	  [ ] Unable to obtain      ICU Vital Signs Last 24 Hrs  T(C): 36.3 (14 Mar 2018 15:51), Max: 36.8 (14 Mar 2018 03:03)  T(F): 97.3 (14 Mar 2018 15:51), Max: 98.3 (14 Mar 2018 03:03)  HR: 92 (14 Mar 2018 15:51) (92 - 99)  BP: 131/73 (14 Mar 2018 15:51) (115/72 - 140/64)  BP(mean): 95 (14 Mar 2018 15:51) (94 - 95)  ABP: --  ABP(mean): --  RR: 18 (14 Mar 2018 15:51) (15 - 18)  SpO2: 100% (14 Mar 2018 15:51) (99% - 100%)      Appearance: Normal, on high flow oxgyen  HEENT:   Normal oral mucosa, PERRL, EOMI	  Lymphatic: No lymphadenopathy  Cardiovascular: Normal S1 S2 tachycardia.   Respiratory: clear	  Psychiatry: A & O x 3, Mood & affect appropriate  Gastrointestinal:  Soft, Non-tender, + BS	  Skin: No rashes, No ecchymoses, No cyanosis	  Neurologic: Non-focal  Extremities: RUE edema.  3rd digit R hand ischemia, motion and sensation intact.                                      10.4   27.5  )-----------( 400      ( 14 Mar 2018 07:14 )             31.6   03-14    137  |  108  |  27<H>  ----------------------------<  109<H>  5.3   |  18<L>  |  1.29    Ca    8.9      14 Mar 2018 07:14  Phos  3.2     03-14  Mg     1.6     03-14    TPro  6.6  /  Alb  3.2<L>  /  TBili  0.4  /  DBili  x   /  AST  25  /  ALT  29  /  AlkPhos  89  03-12

## 2018-03-14 NOTE — PROGRESS NOTE ADULT - ASSESSMENT
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 Patient on broad federica-operative prophylaxis to Vancomycin/Meropenem/Fluconazole based upon his prior LVAD placement and prolonged hospitalization pre-transplant. Intermediate risk for CMV (seropositive); intermediate risk for toxoplasmosis (seropositive R). Improved aeration today at right lung base. He had Significant amount of sero-sanguinous drainage from old LVAD exit site but cultures are NGTD. WBC trending down    - Plan:  Continue OI prophylaxis with Valganciclovir, Nystatin, Bactrim changed to Mepron by transplant team for PCP prophylaxis because of elevated potassium.    Completing 14 days of Cefepime for pseudomonas pneumonia- last day is tomorrow  On Vancomycin for deep wound at old LVAD site with evidence of granulation   send another HCV viral load PCR test- quantitative    Gary Lujan MD  [phone]  After 5pm/weekends [phone]

## 2018-03-14 NOTE — PROGRESS NOTE ADULT - ATTENDING COMMENTS
WBC count and Hgb really bounce quite a bit. Unclear why that is, but he is clinically doing very well.  Working with PT on stairs and trying to walk more.    On cefepime for pseudomonas in sputum and vancomycin for DLES drainage. Afebrile.  On Lovenox 80 mg SC BID for BUE DVT.   EMB done this morning with inadequate tissue, but what was there was grade 0R. Persistent AMR1.   RA 12, PA 40/16/27, PCW 11, PA 61%, CO/CI 5.4/2.5.    Tacro level 12.0 on Cardizem  mg po daily with improved BP control.  On CellCept 1 gm PO BID.  OK to reduce prednisone to 10 mg po BID tonight.  Discharge planning for Monday as I think he needs a bit more time to work with PT. WBC count and Hgb really bounce quite a bit. Unclear why that is, but he is clinically doing very well.  Working with PT on stairs and trying to walk more.    On cefepime for pseudomonas in sputum and vancomycin for DLES drainage. Afebrile.  On Lovenox 80 mg SC BID for BUE DVT.   EMB done this morning with inadequate tissue, but what was there was grade 0R. Persistent AMR1.   RA 12, PA 40/16/27, PCW 11, PA 67%, CO/CI 6.8/3.6.    Tacro level 12.0 on Cardizem  mg po daily with improved BP control.  On CellCept 1 gm PO BID.  OK to reduce prednisone to 10 mg po BID tonight.  Discharge planning for Monday as I think he needs a bit more time to work with PT.

## 2018-03-14 NOTE — PROVIDER CONTACT NOTE (OTHER) - BACKGROUND
6/2017 LVAD HM2  2/1 readmit r/o pump thrombus  2/9 tx CTU near syncope-- anemia 2 u PRBC. EGD bleed cauterized/clipped  2/11 GI capsule study
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
6/2017 LVAD HM2   7/2017 GIB - cautery  2/1 readmit with high LDH, 2/9 near syncope episode-- tx CTU 2 u PRB, GIB clip/cautery
s/p heat transplant 2/23

## 2018-03-14 NOTE — CHART NOTE - NSCHARTNOTEFT_GEN_A_CORE
After consent obtained, pt was slightly upright in reverse Trendelenburg and a 100% NRB mask was placed with access cut out for insertion of flex bronchoscopy. Pt was given 5cc inhaled 4% lidocaine nebulizer for bronchoscopy. Flexible bronchoscopy was advanced into the oropharynx and upon visualization of the vocal cords, 2cc of 1% lidocaine x2 was directly injected onto the vocal cords.  Pt also given oral cetacaine spray for slight cough during bronchoscopy.  Bronchoscope was readvanced into the airway into he trachea without difficulty.  The right mainstem bronchus was entered and a moderate amount of secretions were noted in the BI. 10cc x2 of NS were injected and suctioned and collected for BAL. After suctioning the BI, RML and RLL were patent with no obvious obstruction or secretions.  The Left main bronchus and bronchi to the CHRISTINE and LLL were all patent with minimal secretions noted.  Pt tolerated the procedure well. After consent obtained, pt was slightly upright in reverse Trendelenburg and a 100% NRB mask was placed with access cut out for insertion of flex bronchoscopy. Pt was given 5cc inhaled 4% lidocaine nebulizer for bronchoscopy. Flexible bronchoscopy was advanced into the oropharynx and upon visualization of the vocal cords, 2cc of 1% lidocaine x2 was directly injected onto the vocal cords.  Pt also given oral cetacaine spray for slight cough during bronchoscopy.  Bronchoscope was readvanced into the airway into he trachea without difficulty.  The right mainstem bronchus was entered and a moderate amount of secretions were noted in the BI. 10cc x2 of NS were injected and suctioned and collected for BAL. After suctioning the BI, RML and RLL were patent with no obvious obstruction or secretions.  The Left main bronchus and bronchi to the CHRISTINE and LLL were all patent with minimal secretions noted.  Pt tolerated the procedure well. BAL sent for AFB, sputum culture and gram stain.

## 2018-03-14 NOTE — PROGRESS NOTE ADULT - SUBJECTIVE AND OBJECTIVE BOX
Subjective: EMB today in cath lab    Medications:  acetaminophen   Tablet. 650 milliGRAM(s) Oral every 6 hours PRN  ALBUTerol/ipratropium for Nebulization 3 milliLiter(s) Nebulizer every 6 hours  aspirin enteric coated 81 milliGRAM(s) Oral daily  atovaquone Suspension 1500 milliGRAM(s) Oral daily  Calcium Citrate + Vit D, 315 mg/200 Unit 2 Tablet(s) 2 Tablet(s) Oral two times a day     cefepime  IVPB 2000 milliGRAM(s) IV Intermittent every 8 hours  diltiazem    milliGRAM(s) Oral daily  docusate sodium 100 milliGRAM(s) Oral three times a day  insulin lispro (HumaLOG) corrective regimen sliding scale   SubCutaneous three times a day before meals  insulin lispro (HumaLOG) corrective regimen sliding scale   SubCutaneous at bedtime  magnesium oxide 400 milliGRAM(s) Oral three times a day with meals  multivitamin 1 Tablet(s) Oral daily  mycophenolate mofetil 1000 milliGRAM(s) Oral <User Schedule>  nystatin    Suspension 473944 Unit(s) Oral every 6 hours  pantoprazole    Tablet 40 milliGRAM(s) Oral before breakfast  pravastatin 20 milliGRAM(s) Oral at bedtime  predniSONE   Tablet 12.5 milliGRAM(s) Oral <User Schedule>  silver sulfADIAZINE 1% Cream 1 Application(s) Topical two times a day  tacrolimus 8 milliGRAM(s) Oral <User Schedule>  valGANciclovir 450 milliGRAM(s) Oral every 12 hours  vancomycin  IVPB 1000 milliGRAM(s) IV Intermittent every 12 hours      PHYSICAL EXAM:  Vital Signs Last 24 Hrs  T(C): 36.7 (14 Mar 2018 07:12), Max: 36.8 (13 Mar 2018 10:14)  T(F): 98 (14 Mar 2018 07:12), Max: 98.3 (13 Mar 2018 10:14)  HR: 96 (14 Mar 2018 07:12) (93 - 107)  BP: 115/72 (14 Mar 2018 07:12) (115/72 - 140/64)  BP(mean): 94 (14 Mar 2018 03:03) (94 - 94)  RR: 16 (14 Mar 2018 07:12) (16 - 18)  SpO2: 100% (14 Mar 2018 07:12) (97% - 100%)    I&O's Detail    13 Mar 2018 07:01  -  14 Mar 2018 07:00  --------------------------------------------------------  IN:    Oral Fluid: 960 mL  Total IN: 960 mL    OUT:    Voided: 1050 mL  Total OUT: 1050 mL    Total NET: -90 mL      14 Mar 2018 07:01  -  14 Mar 2018 08:48  --------------------------------------------------------  IN:  Total IN: 0 mL    OUT:    Voided: 250 mL  Total OUT: 250 mL    Total NET: -250 mL      Tacrolimus (), Serum: 10.9: 03.13.18  Tacrolimus (), Serum: 11.5: 03.12.18           General: A/ox 3, No acute Distress  Neck: Supple, NO JVD  Cardiac: S1 S2, No M/R/G  Pulmonary: CTAB, Breathing unlabored, No Rhonchi/Rales/Wheezing  Abdomen: Soft, Non -tender, +BS   Extremities: No Rashes, No edema  Neuro: A/o x 3, No focal deficits  Psch: normal mood , normal affect                              10.4   27.5  )-----------( 400      ( 14 Mar 2018 07:14 )             31.6     03-14    137  |  108  |  27<H>  ----------------------------<  109<H>  5.3   |  18<L>  |  1.29    Ca    8.9      14 Mar 2018 07:14    TPro  6.6  /  Alb  3.2<L>  /  TBili  0.4  /  DBili  x   /  AST  25  /  ALT  29  /  AlkPhos  89  03-12    Magnesium, Serum: 1.6 mg/dL (03.12.18 @ 05:35) Subjective: EMB today in cath lab  Pt feels well no c/o     Medications:  acetaminophen   Tablet. 650 milliGRAM(s) Oral every 6 hours PRN  ALBUTerol/ipratropium for Nebulization 3 milliLiter(s) Nebulizer every 6 hours  aspirin enteric coated 81 milliGRAM(s) Oral daily  atovaquone Suspension 1500 milliGRAM(s) Oral daily  Calcium Citrate + Vit D, 315 mg/200 Unit 2 Tablet(s) 2 Tablet(s) Oral two times a day     cefepime  IVPB 2000 milliGRAM(s) IV Intermittent every 8 hours  diltiazem    milliGRAM(s) Oral daily  docusate sodium 100 milliGRAM(s) Oral three times a day  insulin lispro (HumaLOG) corrective regimen sliding scale   SubCutaneous three times a day before meals  insulin lispro (HumaLOG) corrective regimen sliding scale   SubCutaneous at bedtime  magnesium oxide 400 milliGRAM(s) Oral three times a day with meals  multivitamin 1 Tablet(s) Oral daily  mycophenolate mofetil 1000 milliGRAM(s) Oral <User Schedule>  nystatin    Suspension 979607 Unit(s) Oral every 6 hours  pantoprazole    Tablet 40 milliGRAM(s) Oral before breakfast  pravastatin 20 milliGRAM(s) Oral at bedtime  predniSONE   Tablet 12.5 milliGRAM(s) Oral <User Schedule>  silver sulfADIAZINE 1% Cream 1 Application(s) Topical two times a day  tacrolimus 8 milliGRAM(s) Oral <User Schedule>  valGANciclovir 450 milliGRAM(s) Oral every 12 hours  vancomycin  IVPB 1000 milliGRAM(s) IV Intermittent every 12 hours      PHYSICAL EXAM:  Vital Signs Last 24 Hrs  T(C): 36.7 (14 Mar 2018 07:12), Max: 36.8 (13 Mar 2018 10:14)  T(F): 98 (14 Mar 2018 07:12), Max: 98.3 (13 Mar 2018 10:14)  HR: 96 (14 Mar 2018 07:12) (93 - 107)  BP: 115/72 (14 Mar 2018 07:12) (115/72 - 140/64)  BP(mean): 94 (14 Mar 2018 03:03) (94 - 94)  RR: 16 (14 Mar 2018 07:12) (16 - 18)  SpO2: 100% (14 Mar 2018 07:12) (97% - 100%)    I&O's Detail    13 Mar 2018 07:01  -  14 Mar 2018 07:00  --------------------------------------------------------  IN:    Oral Fluid: 960 mL  Total IN: 960 mL    OUT:    Voided: 1050 mL  Total OUT: 1050 mL    Total NET: -90 mL      14 Mar 2018 07:01  -  14 Mar 2018 08:48  --------------------------------------------------------  IN:  Total IN: 0 mL    OUT:    Voided: 250 mL  Total OUT: 250 mL    Total NET: -250 mL    Tacrolimus (), Serum: 12.0: 03.14.18   Tacrolimus (), Serum: 10.9: 03.13.18  Tacrolimus (), Serum: 11.5: 03.12.18     General: Lying in bed  No distress. Comfortable.  Neck: Neck supple. JVP <6cm H2O.  Chest: Clear to auscultation bilaterally  CV: RRR, Normal S1 and S2. No murmurs, rub, or gallops. +2 radial pulses bilaterally. RUE edematous, wrapped with ace wrap. No LE edema.   Abdomen: Soft, non-distended, non-tender, + BS.   Skin: Wound vac to RLQ abd with no drainage in tubing, scant serous in canister of wound vac. Dressing to midline abd wound c/d/i. Sternal incision well approximated without drainage. R groin dressing s/p bx c/d/i.   Ext: Distal R third digit dusky.   R groin cath site no evidence of bleeding   Neurology: Alert and oriented times three. Sensation intact  Psych: Affect normal                              10.4   27.5  )-----------( 400      ( 14 Mar 2018 07:14 )             31.6     03-14    137  |  108  |  27<H>  ----------------------------<  109<H>  5.3   |  18<L>  |  1.29    Ca    8.9      14 Mar 2018 07:14    TPro  6.6  /  Alb  3.2<L>  /  TBili  0.4  /  DBili  x   /  AST  25  /  ALT  29  /  AlkPhos  89  03-12    Magnesium, Serum: 1.6 mg/dL (03.12.18 @ 05:35)

## 2018-03-15 LAB
ALBUMIN SERPL ELPH-MCNC: 2.8 G/DL — LOW (ref 3.3–5)
ALP SERPL-CCNC: 95 U/L — SIGNIFICANT CHANGE UP (ref 40–120)
ALT FLD-CCNC: 29 U/L RC — SIGNIFICANT CHANGE UP (ref 10–45)
ANION GAP SERPL CALC-SCNC: 14 MMOL/L — SIGNIFICANT CHANGE UP (ref 5–17)
AST SERPL-CCNC: 18 U/L — SIGNIFICANT CHANGE UP (ref 10–40)
BILIRUB SERPL-MCNC: 0.5 MG/DL — SIGNIFICANT CHANGE UP (ref 0.2–1.2)
BUN SERPL-MCNC: 31 MG/DL — HIGH (ref 7–23)
CALCIUM SERPL-MCNC: 8.7 MG/DL — SIGNIFICANT CHANGE UP (ref 8.4–10.5)
CHLORIDE SERPL-SCNC: 105 MMOL/L — SIGNIFICANT CHANGE UP (ref 96–108)
CO2 SERPL-SCNC: 17 MMOL/L — LOW (ref 22–31)
CREAT SERPL-MCNC: 1.41 MG/DL — HIGH (ref 0.5–1.3)
GLUCOSE BLDC GLUCOMTR-MCNC: 101 MG/DL — HIGH (ref 70–99)
GLUCOSE BLDC GLUCOMTR-MCNC: 132 MG/DL — HIGH (ref 70–99)
GLUCOSE BLDC GLUCOMTR-MCNC: 161 MG/DL — HIGH (ref 70–99)
GLUCOSE BLDC GLUCOMTR-MCNC: 99 MG/DL — SIGNIFICANT CHANGE UP (ref 70–99)
GLUCOSE SERPL-MCNC: 158 MG/DL — HIGH (ref 70–99)
GRAM STN FLD: SIGNIFICANT CHANGE UP
HCT VFR BLD CALC: 27.8 % — LOW (ref 39–50)
HCV RNA SERPL NAA DL=5-ACNC: HIGH IU/ML
HCV RNA SPEC NAA+PROBE-LOG IU: 4.34 LOGIU/ML — HIGH
HGB BLD-MCNC: 9.2 G/DL — LOW (ref 13–17)
MCHC RBC-ENTMCNC: 30.9 PG — SIGNIFICANT CHANGE UP (ref 27–34)
MCHC RBC-ENTMCNC: 33.3 GM/DL — SIGNIFICANT CHANGE UP (ref 32–36)
MCV RBC AUTO: 92.8 FL — SIGNIFICANT CHANGE UP (ref 80–100)
PLATELET # BLD AUTO: 407 K/UL — HIGH (ref 150–400)
POTASSIUM SERPL-MCNC: 4.6 MMOL/L — SIGNIFICANT CHANGE UP (ref 3.5–5.3)
POTASSIUM SERPL-SCNC: 4.6 MMOL/L — SIGNIFICANT CHANGE UP (ref 3.5–5.3)
PROT SERPL-MCNC: 6.2 G/DL — SIGNIFICANT CHANGE UP (ref 6–8.3)
RBC # BLD: 2.99 M/UL — LOW (ref 4.2–5.8)
RBC # FLD: 16.9 % — HIGH (ref 10.3–14.5)
SODIUM SERPL-SCNC: 136 MMOL/L — SIGNIFICANT CHANGE UP (ref 135–145)
SPECIMEN SOURCE: SIGNIFICANT CHANGE UP
TACROLIMUS SERPL-MCNC: 15.6 NG/ML — SIGNIFICANT CHANGE UP
WBC # BLD: 23.5 K/UL — HIGH (ref 3.8–10.5)
WBC # FLD AUTO: 23.5 K/UL — HIGH (ref 3.8–10.5)

## 2018-03-15 PROCEDURE — 99232 SBSQ HOSP IP/OBS MODERATE 35: CPT

## 2018-03-15 PROCEDURE — 99233 SBSQ HOSP IP/OBS HIGH 50: CPT

## 2018-03-15 PROCEDURE — 90834 PSYTX W PT 45 MINUTES: CPT

## 2018-03-15 PROCEDURE — 71045 X-RAY EXAM CHEST 1 VIEW: CPT | Mod: 26

## 2018-03-15 RX ORDER — TACROLIMUS 5 MG/1
7 CAPSULE ORAL
Qty: 0 | Refills: 0 | Status: DISCONTINUED | OUTPATIENT
Start: 2018-03-15 | End: 2018-03-18

## 2018-03-15 RX ADMIN — Medication 3 MILLILITER(S): at 05:16

## 2018-03-15 RX ADMIN — Medication 650 MILLIGRAM(S): at 21:05

## 2018-03-15 RX ADMIN — Medication 650 MILLIGRAM(S): at 05:16

## 2018-03-15 RX ADMIN — CEFEPIME 100 MILLIGRAM(S): 1 INJECTION, POWDER, FOR SOLUTION INTRAMUSCULAR; INTRAVENOUS at 22:41

## 2018-03-15 RX ADMIN — MAGNESIUM OXIDE 400 MG ORAL TABLET 400 MILLIGRAM(S): 241.3 TABLET ORAL at 08:00

## 2018-03-15 RX ADMIN — Medication 1 APPLICATION(S): at 18:14

## 2018-03-15 RX ADMIN — Medication 10 MILLIGRAM(S): at 18:12

## 2018-03-15 RX ADMIN — ATOVAQUONE 1500 MILLIGRAM(S): 750 SUSPENSION ORAL at 11:00

## 2018-03-15 RX ADMIN — Medication 650 MILLIGRAM(S): at 05:45

## 2018-03-15 RX ADMIN — Medication 20 MILLIGRAM(S): at 22:41

## 2018-03-15 RX ADMIN — Medication 180 MILLIGRAM(S): at 05:15

## 2018-03-15 RX ADMIN — Medication 500000 UNIT(S): at 11:01

## 2018-03-15 RX ADMIN — Medication 500000 UNIT(S): at 05:16

## 2018-03-15 RX ADMIN — MYCOPHENOLATE MOFETIL 1000 MILLIGRAM(S): 250 CAPSULE ORAL at 08:00

## 2018-03-15 RX ADMIN — Medication 81 MILLIGRAM(S): at 11:01

## 2018-03-15 RX ADMIN — Medication 3 MILLILITER(S): at 18:12

## 2018-03-15 RX ADMIN — ENOXAPARIN SODIUM 80 MILLIGRAM(S): 100 INJECTION SUBCUTANEOUS at 22:42

## 2018-03-15 RX ADMIN — TACROLIMUS 2 MILLIGRAM(S): 5 CAPSULE ORAL at 20:22

## 2018-03-15 RX ADMIN — Medication 250 MILLIGRAM(S): at 18:13

## 2018-03-15 RX ADMIN — Medication 250 MILLIGRAM(S): at 06:08

## 2018-03-15 RX ADMIN — Medication 3 MILLILITER(S): at 11:01

## 2018-03-15 RX ADMIN — MAGNESIUM OXIDE 400 MG ORAL TABLET 400 MILLIGRAM(S): 241.3 TABLET ORAL at 12:34

## 2018-03-15 RX ADMIN — MYCOPHENOLATE MOFETIL 1000 MILLIGRAM(S): 250 CAPSULE ORAL at 20:17

## 2018-03-15 RX ADMIN — CEFEPIME 100 MILLIGRAM(S): 1 INJECTION, POWDER, FOR SOLUTION INTRAMUSCULAR; INTRAVENOUS at 13:09

## 2018-03-15 RX ADMIN — Medication 3 MILLILITER(S): at 23:11

## 2018-03-15 RX ADMIN — Medication 10 MILLIGRAM(S): at 05:24

## 2018-03-15 RX ADMIN — TACROLIMUS 8 MILLIGRAM(S): 5 CAPSULE ORAL at 08:00

## 2018-03-15 RX ADMIN — ENOXAPARIN SODIUM 80 MILLIGRAM(S): 100 INJECTION SUBCUTANEOUS at 10:00

## 2018-03-15 RX ADMIN — Medication 1 APPLICATION(S): at 05:16

## 2018-03-15 RX ADMIN — MAGNESIUM OXIDE 400 MG ORAL TABLET 400 MILLIGRAM(S): 241.3 TABLET ORAL at 18:13

## 2018-03-15 RX ADMIN — Medication 2: at 07:58

## 2018-03-15 RX ADMIN — VALGANCICLOVIR 450 MILLIGRAM(S): 450 TABLET, FILM COATED ORAL at 20:17

## 2018-03-15 RX ADMIN — PANTOPRAZOLE SODIUM 40 MILLIGRAM(S): 20 TABLET, DELAYED RELEASE ORAL at 05:15

## 2018-03-15 RX ADMIN — Medication 1 TABLET(S): at 11:01

## 2018-03-15 RX ADMIN — Medication 650 MILLIGRAM(S): at 20:25

## 2018-03-15 RX ADMIN — CEFEPIME 100 MILLIGRAM(S): 1 INJECTION, POWDER, FOR SOLUTION INTRAMUSCULAR; INTRAVENOUS at 05:12

## 2018-03-15 RX ADMIN — Medication 500000 UNIT(S): at 18:12

## 2018-03-15 RX ADMIN — VALGANCICLOVIR 450 MILLIGRAM(S): 450 TABLET, FILM COATED ORAL at 08:00

## 2018-03-15 RX ADMIN — Medication 500000 UNIT(S): at 23:11

## 2018-03-15 NOTE — PROGRESS NOTE ADULT - ASSESSMENT
Seen resting in bed.  A&Ox3.  Slept well last night. Appetite good.  Mood slightly dysphoric.  Tearful at times when talking about family members that are  (his wife and only son).  Although he is very happy with transplant, has been thinking about how much he misses close family members and wishing they were here to enjoy this moment with him. Given his tendency to mask uncomfortable feelings and put on a "happy face" will benefit from ongoing education on normal range of emotions following heart transplant/impact of steroids on mood.  Receptive to support and validation.  Reviewed transplant education.  Reports he sometimes gets "nervous" when asked to recall the information which makes it more difficult to remember.  Discussed strategies to help him manage stress.  Team has been working closely with him and utilizing various strategies to help with education process.    DX: Adjustment disorder       Recommendations:   Behavioral Cardiology will continue to follow as needed

## 2018-03-15 NOTE — PROGRESS NOTE ADULT - ATTENDING COMMENTS
No overnight events or issues. Continues to improve each day.  Working on stairs with PT.    On cefepime for pseudomonas in sputum and vancomycin for DLES drainage. Afebrile.  On Lovenox 80 mg SC BID for BUE DVT.     EMB #3: 0R. Persistent AMR1. No DSA.  RA 12, PA 40/16/27, PCW 11, PA 67%, CO/CI 6.8/3.6.  Mild increase in SCr. Will monitor for now.    Tacro level 15.6 (12.0) on Cardizem  mg po daily with improved BP control.  On CellCept 1 gm PO BID.  Prednisone at 10 mg po BID.  Discharge planning for Monday as I think he needs a bit more time to work with PT.

## 2018-03-15 NOTE — PROGRESS NOTE ADULT - SUBJECTIVE AND OBJECTIVE BOX
INFECTIOUS DISEASES FOLLOW UP-- Sujey Lujan  [phone]    This is a follow up note for this  67yMale with s/p OHTX on 2/23. Feeling stronger ambulated in hallway today      ROS:  CONSTITUTIONAL:  No fever, good appetite  CARDIOVASCULAR:  No chest pain or palpitations  RESPIRATORY:  No dyspnea  GASTROINTESTINAL:  No nausea, vomiting, diarrhea, or abdominal pain  GENITOURINARY:  No dysuria  NEUROLOGIC:  No headache,     Allergies    No Known Allergies    Intolerances        ANTIBIOTICS/RELEVANT:  antimicrobials  atovaquone Suspension 1500 milliGRAM(s) Oral daily  cefepime  IVPB      cefepime  IVPB 2000 milliGRAM(s) IV Intermittent every 8 hours  nystatin    Suspension 110409 Unit(s) Oral every 6 hours  valGANciclovir 450 milliGRAM(s) Oral every 12 hours  vancomycin  IVPB 1000 milliGRAM(s) IV Intermittent every 12 hours    immunologic:  mycophenolate mofetil 1000 milliGRAM(s) Oral <User Schedule>  tacrolimus 8 milliGRAM(s) Oral <User Schedule>    OTHER:  acetaminophen   Tablet. 650 milliGRAM(s) Oral every 6 hours PRN  ALBUTerol/ipratropium for Nebulization 3 milliLiter(s) Nebulizer every 6 hours  aspirin enteric coated 81 milliGRAM(s) Oral daily  Calcium Citrate + Vit D, 315 mg/200 Unit 2 Tablet(s) 2 Tablet(s) Oral two times a day  diltiazem    milliGRAM(s) Oral daily  docusate sodium 100 milliGRAM(s) Oral three times a day  enoxaparin Injectable 80 milliGRAM(s) SubCutaneous <User Schedule>  insulin lispro (HumaLOG) corrective regimen sliding scale   SubCutaneous three times a day before meals  insulin lispro (HumaLOG) corrective regimen sliding scale   SubCutaneous at bedtime  magnesium oxide 400 milliGRAM(s) Oral three times a day with meals  multivitamin 1 Tablet(s) Oral daily  pantoprazole    Tablet 40 milliGRAM(s) Oral before breakfast  pravastatin 20 milliGRAM(s) Oral at bedtime  predniSONE   Tablet 10 milliGRAM(s) Oral every 12 hours  silver sulfADIAZINE 1% Cream 1 Application(s) Topical two times a day      Objective:  Vital Signs Last 24 Hrs  T(C): 36.3 (15 Mar 2018 10:10), Max: 36.4 (15 Mar 2018 03:26)  T(F): 97.3 (15 Mar 2018 10:10), Max: 97.6 (15 Mar 2018 03:26)  HR: 101 (15 Mar 2018 14:21) (96 - 107)  BP: 105/63 (15 Mar 2018 14:21) (103/63 - 126/70)  BP(mean): 87 (15 Mar 2018 05:14) (79 - 91)  RR: 16 (15 Mar 2018 10:10) (16 - 18)  SpO2: 99% (15 Mar 2018 10:10) (96% - 100%)    PHYSICAL EXAM:  Constitutional:no acute distress  Eyes:WALT, EOMI  Ear/Nose/Throat: no oral lesions, 	  Respiratory: diminished BS at right base  Cardiovascular: S1S2, sternal wound dressing dry and intact  LVAd exit site with wound vac in place  Gastrointestinal:soft, (+) BS, no tenderness  Extremities:no e/e/c  No Lymphadenopathy  IV sites not inflammed.    LABS:                        9.2    23.5  )-----------( 407      ( 15 Mar 2018 07:18 )             27.8     03-15    136  |  105  |  31<H>  ----------------------------<  158<H>  4.6   |  17<L>  |  1.41<H>    Ca    8.7      15 Mar 2018 07:18  Phos  3.2     03-14  Mg     1.6     03-14    TPro  6.2  /  Alb  2.8<L>  /  TBili  0.5  /  DBili  x   /  AST  18  /  ALT  29  /  AlkPhos  95  03-15          MICROBIOLOGY:            RECENT CULTURES:  03-14 @ 21:44  .Sputum Sputum  --  --  --  --  --  03-11 @ 22:57  .Sputum Sputum  Pseudomonas aeruginosa (Carbapenem Resistant)  Pseudomonas aeruginosa (Carbapenem Resistant)  KAMILA    Few Pseudomonas aeruginosa (Carbapenem Resistant)  Normal Respiratory Heaven present  --  03-10 @ 14:32  .Urine Clean Catch (Midstream)  --  --  --    No growth  --  03-09 @ 22:00  .Blood Blood  --  --  --    No growth at 5 days.  --  03-09 @ 13:03  .Blood Blood  --  --  --    No growth at 5 days.  --      RADIOLOGY & ADDITIONAL STUDIES:    < from: Xray Chest 1 View- PORTABLE-Routine (03.15.18 @ 05:47) >  MPRESSION:    1.  Unchanged right pleural effusion.  2.  Right lower lung atelectasis.    < end of copied text >

## 2018-03-15 NOTE — CHART NOTE - NSCHARTNOTEFT_GEN_A_CORE
Source: Patient [ x ]    Family [ ]     other [ x ] RN, heart failure team, Comprehensive chart review     Pt seen for nutrition follow up. States he is feeling grumpy at time of interview, per d/w psychologist pt was encouraged to express his emotions. Pt unwilling to teach-back prior nutrition education as this time, however, was receptive to review of post transplant medical nutrition therapy. Also reviewed foods high and low in potassium as pt has been experiencing hyperkalemia.   Pt with a good appetite and eating well, however, states he did not care for lunch but refused alternate options when offered. Pt is drinking 2 Ensure Enlive per day. Denies GI distress with BM passed today.     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 Noted per ID pt with right lower lobe atelectasis vs early PNA; s/p bronchoscopy- no mucus plug and atelectasis improving. Pt s/p biopsy #1, showed CMR 1R, AMR 1 (no cellular injury); s/p biopsy #2 and bronchoscopy yesterday, with RLL atelectasis.    Diet : regular, fiber/residue restricted, no concentrated potassium, Ensure Enlive 3 cans/day      Admission Weight: 78.9kg  Current Weight: 75.5kg  Weight fluctuations noted, likely due to fluid shifts. Pt with 2+right arm edema.     Pertinent Medications: MEDICATIONS  (STANDING):  ALBUTerol/ipratropium for Nebulization 3 milliLiter(s) Nebulizer every 6 hours  aspirin enteric coated 81 milliGRAM(s) Oral daily  atovaquone Suspension 1500 milliGRAM(s) Oral daily  Calcium Citrate + Vit D, 315 mg/200 Unit 2 Tablet(s) 2 Tablet(s) Oral two times a day  cefepime  IVPB      cefepime  IVPB 2000 milliGRAM(s) IV Intermittent every 8 hours  diltiazem    milliGRAM(s) Oral daily  docusate sodium 100 milliGRAM(s) Oral three times a day  enoxaparin Injectable 80 milliGRAM(s) SubCutaneous <User Schedule>  insulin lispro (HumaLOG) corrective regimen sliding scale   SubCutaneous three times a day before meals  insulin lispro (HumaLOG) corrective regimen sliding scale   SubCutaneous at bedtime  magnesium oxide 400 milliGRAM(s) Oral three times a day with meals  multivitamin 1 Tablet(s) Oral daily  mycophenolate mofetil 1000 milliGRAM(s) Oral <User Schedule>  nystatin    Suspension 601870 Unit(s) Oral every 6 hours  pantoprazole    Tablet 40 milliGRAM(s) Oral before breakfast  pravastatin 20 milliGRAM(s) Oral at bedtime  predniSONE   Tablet 10 milliGRAM(s) Oral every 12 hours  silver sulfADIAZINE 1% Cream 1 Application(s) Topical two times a day  tacrolimus 8 milliGRAM(s) Oral <User Schedule>  valGANciclovir 450 milliGRAM(s) Oral every 12 hours  vancomycin  IVPB 1000 milliGRAM(s) IV Intermittent every 12 hours    MEDICATIONS  (PRN):  acetaminophen   Tablet. 650 milliGRAM(s) Oral every 6 hours PRN Mild Pain (1 - 3)    Pertinent Labs:    Complete Blood Count (03.15.18 @ 07:18)    Hemoglobin: 9.2 g/dL - low    Hematocrit: 27.8 %- low  Comprehensive Metabolic Panel (03.15.18 @ 07:18)    Sodium, Serum: 136 mmol/L    Potassium, Serum: 4.6 mmol/L    Chloride, Serum: 105 mmol/L    Carbon Dioxide, Serum: 17 mmol/L - low    Anion Gap, Serum: 14 mmol/L    Blood Urea Nitrogen, Serum: 31 mg/dL - high    Creatinine, Serum: 1.41 mg/dL - high    Glucose, Serum: 158 mg/dL - high    Calcium, Total Serum: 8.7 mg/dL    Protein Total, Serum: 6.2 g/dL    Albumin, Serum: 2.8 g/dL - low    Bilirubin Total, Serum: 0.5 mg/dL    Alkaline Phosphatase, Serum: 95 U/L    Aspartate Aminotransferase (AST/SGOT): 18 U/L    Alanine Aminotransferase (ALT/SGPT): 29 U/L     Point of Care Testing BG: (3/15)161, (3/14)      Skin: No pressure ulcers noted.    Estimated Needs:   [ x ] no change since previous assessment  [ ] recalculated:       Previous Nutrition Diagnosis:   Limited adherence to nutrition related recommendations being addressed with continued diet education.   Inadequate oral intake and Increased Nutrient Needs being addressed with PO diet and supplements.           New Nutrition Diagnosis: [ x ] not applicable      Interventions:   1. Encourage PO intake of all meals and supplements.  2. Encourage use of alternate menu options as needed.   3. Provide food preferences within therapeutic diet when requested.   4. Reviewed medical nutrition therapy for heart health, potassium content of foods, food safety for transplant recipients, and food/drug interactions.  5. Pt made aware RD remains available and contact information provided.        Monitoring and Evaluation:     [ x ] PO intake [ x ] Tolerance to diet prescription [ x ] weights [ x ] follow up per protocol    [ ] other:

## 2018-03-15 NOTE — PROGRESS NOTE ADULT - SUBJECTIVE AND OBJECTIVE BOX
VITAL SIGNS-Tele: SR     Vital Signs Last 24 Hrs  T(C): 36.3 (03-15-18 @ 10:10), Max: 36.4 (03-15-18 @ 03:26)  HR: 102 (03-15-18 @ 10:10) (92 - 107)  BP: 103/63 (03-15-18 @ 10:10) (103/63 - 131/73)  SpO2: 99% (03-15-18 @ 10:10) (96% - 100%)          @ 07:01  -  03-15 @ 07:00  --------------------------------------------------------  IN: 730 mL / OUT: 700 mL / NET: 30 mL    03-15 @ 07:01  -  03-15 @ 11:21  --------------------------------------------------------  IN: 240 mL / OUT: 200 mL / NET: 40 mL    Daily     Daily Weight in k.5 (15 Mar 2018 05:38)    CAPILLARY BLOOD GLUCOSE  POCT Blood Glucose.: 161 mg/dL (15 Mar 2018 07:10)  POCT Blood Glucose.: 135 mg/dL (14 Mar 2018 21:48)  POCT Blood Glucose.: 97 mg/dL (14 Mar 2018 17:13)  POCT Blood Glucose.: 99 mg/dL (14 Mar 2018 11:34)        Drains:     Wound VAC - to previous drive line site-     PHYSICAL EXAM:  Neurology: alert and oriented x 3, nonfocal, no gross deficits  CV : S1S2  Sternal Wound :  CDI , Stable  Lungs: Absent BS RLL, diminished BS LLL  Abdomen: soft, nontender, nondistended, positive bowel sounds, last bowel movement      3/15 -RLQ VAC -patent  Extremities:     no edema , no calf tenderness    acetaminophen   Tablet. 650 milliGRAM(s) Oral every 6 hours PRN  ALBUTerol/ipratropium for Nebulization 3 milliLiter(s) Nebulizer every 6 hours  aspirin enteric coated 81 milliGRAM(s) Oral daily  atovaquone Suspension 1500 milliGRAM(s) Oral daily  Calcium Citrate + Vit D, 315 mg/200 Unit 2 Tablet(s) 2 Tablet(s) Oral two times a day  cefepime  IVPB      cefepime  IVPB 2000 milliGRAM(s) IV Intermittent every 8 hours  diltiazem    milliGRAM(s) Oral daily  docusate sodium 100 milliGRAM(s) Oral three times a day  enoxaparin Injectable 80 milliGRAM(s) SubCutaneous <User Schedule>  insulin lispro (HumaLOG) corrective regimen sliding scale   SubCutaneous three times a day before meals  insulin lispro (HumaLOG) corrective regimen sliding scale   SubCutaneous at bedtime  magnesium oxide 400 milliGRAM(s) Oral three times a day with meals  multivitamin 1 Tablet(s) Oral daily  mycophenolate mofetil 1000 milliGRAM(s) Oral <User Schedule>  nystatin    Suspension 159524 Unit(s) Oral every 6 hours  pantoprazole    Tablet 40 milliGRAM(s) Oral before breakfast  pravastatin 20 milliGRAM(s) Oral at bedtime  predniSONE   Tablet 10 milliGRAM(s) Oral every 12 hours  silver sulfADIAZINE 1% Cream 1 Application(s) Topical two times a day  tacrolimus 8 milliGRAM(s) Oral <User Schedule>  valGANciclovir 450 milliGRAM(s) Oral every 12 hours  vancomycin  IVPB 1000 milliGRAM(s) IV Intermittent every 12 hours    Physical Therapy Rec:   Home  [  ]   Home w/ PT  [ x ]  Rehab  [  ] goal for discharge is Monday 3/19/18  Discussed with Cardiothoracic Team at AM rounds.

## 2018-03-15 NOTE — PROGRESS NOTE ADULT - SUBJECTIVE AND OBJECTIVE BOX
Subjective:  Reports feeling well. Planned to work with PT this morning to ambulate and do stairs. Endorses LOBATO when walking or climbing stairs. Denies SOB at rest, CP, palpitations, lightheadedness, dizziness, orthopnea, PND, fevers, chills. Denies pain in R 3rd digit.    Medications:  acetaminophen   Tablet. 650 milliGRAM(s) Oral every 6 hours PRN  ALBUTerol/ipratropium for Nebulization 3 milliLiter(s) Nebulizer every 6 hours  aspirin enteric coated 81 milliGRAM(s) Oral daily  atovaquone Suspension 1500 milliGRAM(s) Oral daily  Calcium Citrate + Vit D, 315 mg/200 Unit 2 Tablet(s) 2 Tablet(s) Oral two times a day  cefepime  IVPB      cefepime  IVPB 2000 milliGRAM(s) IV Intermittent every 8 hours  diltiazem    milliGRAM(s) Oral daily  docusate sodium 100 milliGRAM(s) Oral three times a day  enoxaparin Injectable 80 milliGRAM(s) SubCutaneous <User Schedule>  insulin lispro (HumaLOG) corrective regimen sliding scale   SubCutaneous three times a day before meals  insulin lispro (HumaLOG) corrective regimen sliding scale   SubCutaneous at bedtime  magnesium oxide 400 milliGRAM(s) Oral three times a day with meals  multivitamin 1 Tablet(s) Oral daily  mycophenolate mofetil 1000 milliGRAM(s) Oral <User Schedule>  nystatin    Suspension 702490 Unit(s) Oral every 6 hours  pantoprazole    Tablet 40 milliGRAM(s) Oral before breakfast  pravastatin 20 milliGRAM(s) Oral at bedtime  predniSONE   Tablet 10 milliGRAM(s) Oral every 12 hours  silver sulfADIAZINE 1% Cream 1 Application(s) Topical two times a day  tacrolimus 8 milliGRAM(s) Oral <User Schedule>  valGANciclovir 450 milliGRAM(s) Oral every 12 hours  vancomycin  IVPB 1000 milliGRAM(s) IV Intermittent every 12 hours      Physical Exam:    Vitals:  Vital Signs Last 24 Hours  T(C): 36.3 (03-15-18 @ 10:10), Max: 36.4 (03-15-18 @ 03:26)  HR: 101 (03-15-18 @ 14:21) (96 - 107)  BP: 105/63 (03-15-18 @ 14:21) (103/63 - 126/70)  RR: 16 (03-15-18 @ 10:10) (16 - 18)  SpO2: 99% (03-15-18 @ 10:10) (96% - 100%)    Weight in k.5 (03-15 @ 05:38)    I&O's Summary    14 Mar 2018 07:  -  15 Mar 2018 07:00  --------------------------------------------------------  IN: 730 mL / OUT: 700 mL / NET: 30 mL    15 Mar 2018 07:  -  15 Mar 2018 15:58  --------------------------------------------------------  IN: 240 mL / OUT: 500 mL / NET: -260 mL    Tele: - ST 90-110s    General: Lying in bed. No distress. Comfortable.  Neck: Neck supple. JVP < 6cm H2O.  Chest: Diminished RLL, otherwise CTA bilaterally.  CV: Regular, tachycardic. Normal S1 and S2. No murmurs, rub, or gallops. +2 radial pulses bilaterally. No LE edema.   Abdomen: Soft, non-distended, non-tender, + BS  Skin: RLQ wound vac in place without drainage. midline abdominal wound with gauze in place c/d/i.  Sternal incision with steristrips, small area of superficial dehiscence at top of sternal incision, no drainage, remainder of sternal incision well approximated with steristrips in place. No erythema or drainge noted.   Neurology: Alert and oriented times three. Sensation intact  Psych: Affect normal    Labs:                        9.2    23.5  )-----------( 407      ( 15 Mar 2018 07:18 )             27.8     03-15    136  |  105  |  31<H>  ----------------------------<  158<H>  4.6   |  17<L>  |  1.41<H>    Ca    8.7      15 Mar 2018 07:18  Phos  3.2     03-14  Mg     1.6     03-14    TPro  6.2  /  Alb  2.8<L>  /  TBili  0.5  /  DBili  x   /  AST  18  /  ALT  29  /  AlkPhos  95  03-15    Tacrolimus (), Serum (03.15.18 @ 07:49)    Tacrolimus (), Serum: 15.6  Tacrolimus (), Serum (18 @ 08:09)    Tacrolimus (), Serum: 12.0  Tacrolimus (), Serum (18 @ 08:03)    Tacrolimus (), Serum: 10.9

## 2018-03-15 NOTE — PROGRESS NOTE ADULT - PROBLEM SELECTOR PLAN 4
multiple clots  Lt Sc DVT, cephalic DVT, rt IJ innominate clot     ?awaiting PICC line eval   for abx and access  will attempt to place peripheral IV today multiple clots  Lt Sc DVT, cephalic DVT, rt IJ innominate clot  ?awaiting PICC line eval for abx and access  will attempt to place peripheral IV today

## 2018-03-15 NOTE — PROGRESS NOTE ADULT - SUBJECTIVE AND OBJECTIVE BOX
Behavioral Cardiology Progress Note     HPI:  Mr. Baez is a 67 year-old man, , only son . Owns house he lives in with his brother.  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  No PPH.     Current stressors:   Hospitalization   s/p Heart transplant (18)      Support system/family support: Good support from family and close friend      Coping strategies: Talking with family and staff, watching TV, his pastora, talking to his granddaughter     Understanding of medical illness and treatment plan:   Ongoing education on medication management provided by HT coordinator and other team members. Strategies being utilized to accommodate limited literacy (pictures of medication).  Good understanding of need for lifelong immunosuppressant medication, importance of calling HT coordinator.  Benefits from frequent review and teach-back of all education.     MSE:  Seen resting in bed.  A&Ox3.  Well related with good eye contact. Thought process goal directed. No evidence of any psychosis, delusions, jona.  Mood slightly dysphoric. Affect sad.  Insight and judgment adequate.

## 2018-03-15 NOTE — PROGRESS NOTE ADULT - ASSESSMENT
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 Patient on broad federica-operative prophylaxis to Vancomycin/Meropenem/Fluconazole based upon his prior LVAD placement and prolonged hospitalization pre-transplant. Intermediate risk for CMV (seropositive); intermediate risk for toxoplasmosis (seropositive R). Improved aeration today at right lung base. He had Significant amount of sero-sanguinous drainage from old LVAD exit site but cultures are NGTD. WBC trending down    - Plan:  Continue OI prophylaxis with Valganciclovir, Nystatin, Bactrim changed to Mepron by transplant team for PCP prophylaxis because of elevated potassium.    Completing 14 days of Cefepime for pseudomonas pneumonia- last day is tomorrow 3/16/18  On Vancomycin for deep wound at old LVAD site with evidence of granulation   send another HCV viral load PCR test- quantitative  repeat Vanco trough    Gary Lujan MD  [phone]  After 5pm/weekends [phone] 66 yo man s/p prior LVAD placement, no infections related to the LVAD prior to undergoing an orthotopic heart transplant 2/23 from a reportedly high risk donor HCV. Patient on broad federica-operative prophylaxis to Vancomycin/Meropenem/Fluconazole based upon his prior LVAD placement and prolonged hospitalization pre-transplant. Intermediate risk for CMV (seropositive); intermediate risk for toxoplasmosis (seropositive R). Improved aeration today at right lung base. He had Significant amount of sero-sanguinous drainage from old LVAD exit site but cultures are NGTD. WBC trending down    - Plan:  Continue OI prophylaxis with Valganciclovir, Nystatin, Bactrim changed to Mepron by transplant team for PCP prophylaxis because of elevated potassium.    Completing 14 days of Cefepime for pseudomonas pneumonia- last day is tomorrow 3/16/18  On Vancomycin for deep wound at old LVAD site with evidence of granulation   send another HCV viral load PCR test- quantitative  repeat Vanco trough  Patient received a heart from an HCV+ donor with genotype 1a- his/recipient most recent HCV VL is in the 4 log range and will need treatment for HCV post discharge with [facility]. His LFTs currently are wnl    Gary Lujan MD  [phone]  After 5pm/weekends [phone]

## 2018-03-15 NOTE — PROGRESS NOTE ADULT - ASSESSMENT
67 yr old male s/p  Heart transplant 2/23/18 for HF   H; GI bleed, LVAD implant, afib, anemia AICD  Intra op acute rejection of graft and Intra-op IABP placed  and removed POD 1 and received plasmaphoresis x 2    2/26 UE +RT IJ inominate SC,cephalic DVT and +lt SC DVT  3/1  cardiac bx low grade rejection  3/2 bronchoscopy   neg   3/4  t/c too floor,  Imipenum for low grade fevers  3/5 Sputum + PSA, ID to see pt  Vac changed to RLQ  3/6 Pt on cefepime  3/8 Cardiac biopsy this am - prograf level - 7- prograf level increased to 8mg po bid  toprol d/c'd,  Cardizem CD 120mg po initiated  high filling pressures noted on right heart cath-lasix 40mg po daily started as per HF  K+= 6 - Bactrim d/c'd - Mepron to start in am for prophylaxis  rpt k+ = 5.2-  daily prograf levels, daily CXR, bid CMP & CBC  plastics consult for ?necrotic right 3rd digit  Cardiac biopsy results pending- depending on biopsy results - HF to adjust prednisone taper  d/c planning  3/9 prednisone 12.5 bid.  Lasix x 1 in am tomorrow, then d/c.   Prograf 10.4, level increased from 7.9 so will need a prograf level tonight at 7 pm, Dr. Mccabe to be notified.  K elevated , if continues to rise may need  kaexalate  Finger with  possible necrosis 3rd R.  Tophus noted by rheumatology,+ crystals  specimen sent.  Hand /plastics consulted, finger  drained, may need further intervention  3/10  WBC to 29   afebrile,  ct abdomen  chest pelvis ordered,   Prograf level 11,  vss   3/11 wbc trending down.  CT chest abd pelvis :  < from: CT Chest No Cont (03.10.18 @ 15:47) >  IMPRESSION:   Small fluid collection inferior to the xiphoid process likely the site of   prior drain.  7 mm nodule in the lingula, new from prior imaging.  No evidence of infection in the abdomen or pelvis.  < end of copied text >    Hct down , if  decreases further on repeat labs , PRBC to be given  Finger  with less discomfort, no drainage today unable to culture.  Minimal drainage after it was drained on  3/9, culture not sent during   BC 3/9 neg to date  UA neg  Driveline site  cx negative from 3/5  WBC trending down  Tacro leve 14.2, D/W hf , MAINTAIN CURRENT DOSAGE  Cont IV abx  Cont lovenox for dvt  3/12 Pt with decreased hct, 1uprbc being given.  Plastics f/u , would use silvadine  on finger, no further intervention at this time  3/13 The pt ambulated on stairs.  To d/w ID the course of antibiotics  Prograf level 10  Repeat duplex: < from: [facility] Duplex Upper Ext Vein Scan, Bilat (03.12.18 @ 19:53) >  Nonocclusive DVT of the right internal jugular vein. Persistent occlusive   DVT in the innominate and subclavian veins. Superficial thrombosis of the   right cephalic vein.    Persistent occlusive DVT of the left subclavian vein. Nonocclusive DVT of   the left internal jugular and innominate veins. Nonocclusive DVT of the   axillary and brachial vein surrounding a left PICC.    Fluid collections within the right medial chest wall and right medial   upper extremity.    < end of copied text >    Studies reviewed w Dr Tavarez, will continue lvx 80 bid  d/c planning  3/14 the patient went for biopsy this am, he will need his lovenox resumed tonight.  RLL collapse with persistent wbc , for bronch today  with Dr Sutherland.  Prograff 12, dose remains at 8mg bid  bp 115/72- 125/77  with cardizem increased yesterday to 180mg daily  PT changed woundvac  3/15 Pt reports feeling better today. s/p cardiac bx and bedside bronch yesterday. AFB and cultures sent.       ICU Vital Signs Last 24 Hrs  T(C): 36.4 (15 Mar 2018 03:26), Max: 36.4 (15 Mar 2018 03:26)  T(F): 97.6 (15 Mar 2018 03:26), Max: 97.6 (15 Mar 2018 03:26)  HR: 107 (15 Mar 2018 05:14) (92 - 107)  BP: 116/67 (15 Mar 2018 05:14) (116/67 - 131/73)  BP(mean): 87 (15 Mar 2018 05:14) (79 - 95)  ABP: --  ABP(mean): --  RR: 17 (15 Mar 2018 03:26) (15 - 18)  SpO2: 96% (15 Mar 2018 03:26) (96% - 100%)      MEDICATIONS  (STANDING):  ALBUTerol/ipratropium for Nebulization 3 milliLiter(s) Nebulizer every 6 hours  aspirin enteric coated 81 milliGRAM(s) Oral daily  atovaquone Suspension 1500 milliGRAM(s) Oral daily  Calcium Citrate + Vit D, 315 mg/200 Unit 2 Tablet(s) 2 Tablet(s) Oral two times a day  cefepime  IVPB      cefepime  IVPB 2000 milliGRAM(s) IV Intermittent every 8 hours  diltiazem    milliGRAM(s) Oral daily  docusate sodium 100 milliGRAM(s) Oral three times a day  enoxaparin Injectable 80 milliGRAM(s) SubCutaneous <User Schedule> 1000 and 2000  insulin lispro (HumaLOG) corrective regimen sliding scale   SubCutaneous three times a day before meals  insulin lispro (HumaLOG) corrective regimen sliding scale   SubCutaneous at bedtime  magnesium oxide 400 milliGRAM(s) Oral three times a day with meals  multivitamin 1 Tablet(s) Oral daily  mycophenolate mofetil 1000 milliGRAM(s) Oral <User Schedule>  nystatin    Suspension 020137 Unit(s) Oral every 6 hours  pantoprazole    Tablet 40 milliGRAM(s) Oral before breakfast  pravastatin 20 milliGRAM(s) Oral at bedtime  predniSONE   Tablet 10 milliGRAM(s) Oral every 12 hours  silver sulfADIAZINE 1% Cream 1 Application(s) Topical two times a day  tacrolimus 8 milliGRAM(s) Oral <User Schedule>  valGANciclovir 450 milliGRAM(s) Oral every 12 hours  vancomycin  IVPB 1000 milliGRAM(s) IV Intermittent every 12 hours    MEDICATIONS  (PRN):  acetaminophen   Tablet. 650 milliGRAM(s) Oral every 6 hours PRN Mild Pain (1 - 3) 67 yr old male s/p  Heart transplant 2/23/18 for HF   H; GI bleed, LVAD implant, afib, anemia AICD  Intra op acute rejection of graft and Intra-op IABP placed  and removed POD 1 and received plasmaphoresis x 2    2/26 UE +RT IJ inominate SC,cephalic DVT and +lt SC DVT  3/1  cardiac bx low grade rejection  3/2 bronchoscopy   neg   3/4  t/c too floor,  Imipenum for low grade fevers  3/5 Sputum + PSA, ID to see pt  Vac changed to RLQ  3/6 Pt on cefepime  3/8 Cardiac biopsy this am - prograf level - 7- prograf level increased to 8mg po bid  toprol d/c'd,  Cardizem CD 120mg po initiated  high filling pressures noted on right heart cath-lasix 40mg po daily started as per HF  K+= 6 - Bactrim d/c'd - Mepron to start in am for prophylaxis  rpt k+ = 5.2-  daily prograf levels, daily CXR, bid CMP & CBC  plastics consult for ?necrotic right 3rd digit  Cardiac biopsy results pending- depending on biopsy results - HF to adjust prednisone taper  d/c planning  3/9 prednisone 12.5 bid.  Lasix x 1 in am tomorrow, then d/c.   Prograf 10.4, level increased from 7.9 so will need a prograf level tonight at 7 pm, Dr. Mccabe to be notified.  K elevated , if continues to rise may need  kaexalate  Finger with  possible necrosis 3rd R.  Tophus noted by rheumatology,+ crystals  specimen sent.  Hand /plastics consulted, finger  drained, may need further intervention  3/10  WBC to 29   afebrile,  ct abdomen  chest pelvis ordered,   Prograf level 11,  vss   3/11 wbc trending down.  CT chest abd pelvis :  < from: CT Chest No Cont (03.10.18 @ 15:47) >  IMPRESSION:   Small fluid collection inferior to the xiphoid process likely the site of   prior drain.  7 mm nodule in the lingula, new from prior imaging.  No evidence of infection in the abdomen or pelvis.  < end of copied text >    Hct down , if  decreases further on repeat labs , PRBC to be given  Finger  with less discomfort, no drainage today unable to culture.  Minimal drainage after it was drained on  3/9, culture not sent during   BC 3/9 neg to date  UA neg  Driveline site  cx negative from 3/5  WBC trending down  Tacro leve 14.2, D/W hf , MAINTAIN CURRENT DOSAGE  Cont IV abx  Cont lovenox for dvt  3/12 Pt with decreased hct, 1uprbc being given.  Plastics f/u , would use silvadine  on finger, no further intervention at this time  3/13 The pt ambulated on stairs.  To d/w ID the course of antibiotics  Prograf level 10  Repeat duplex: < from: [facility] Duplex Upper Ext Vein Scan, Bilat (03.12.18 @ 19:53) >  Nonocclusive DVT of the right internal jugular vein. Persistent occlusive   DVT in the innominate and subclavian veins. Superficial thrombosis of the   right cephalic vein.    Persistent occlusive DVT of the left subclavian vein. Nonocclusive DVT of   the left internal jugular and innominate veins. Nonocclusive DVT of the   axillary and brachial vein surrounding a left PICC.    Fluid collections within the right medial chest wall and right medial   upper extremity.    < end of copied text >    Studies reviewed w Dr Tavarez, will continue lvx 80 bid  d/c planning  3/14 the patient went for biopsy this am, he will need his lovenox resumed tonight.  RLL collapse with persistent wbc , for bronch today  with Dr Sutherland.  Prograff 12, dose remains at 8mg bid  bp 115/72- 125/77  with cardizem increased yesterday to 180mg daily  PT changed woundvac  3/15 Pt reports feeling better today. K+5.6 overnight, repeat 4.6 this AM, will cont to monitor.  s/p cardiac bx and bedside bronch yesterday. AFB and cultures sent. Prednisone taper 10 mg today. Midline d/c'd. PIV placed.       ICU Vital Signs Last 24 Hrs  T(C): 36.4 (15 Mar 2018 03:26), Max: 36.4 (15 Mar 2018 03:26)  T(F): 97.6 (15 Mar 2018 03:26), Max: 97.6 (15 Mar 2018 03:26)  HR: 107 (15 Mar 2018 05:14) (92 - 107)  BP: 116/67 (15 Mar 2018 05:14) (116/67 - 131/73)  BP(mean): 87 (15 Mar 2018 05:14) (79 - 95)  ABP: --  ABP(mean): --  RR: 17 (15 Mar 2018 03:26) (15 - 18)  SpO2: 96% (15 Mar 2018 03:26) (96% - 100%)      MEDICATIONS  (STANDING):  ALBUTerol/ipratropium for Nebulization 3 milliLiter(s) Nebulizer every 6 hours  aspirin enteric coated 81 milliGRAM(s) Oral daily  atovaquone Suspension 1500 milliGRAM(s) Oral daily  Calcium Citrate + Vit D, 315 mg/200 Unit 2 Tablet(s) 2 Tablet(s) Oral two times a day  cefepime  IVPB      cefepime  IVPB 2000 milliGRAM(s) IV Intermittent every 8 hours  diltiazem    milliGRAM(s) Oral daily  docusate sodium 100 milliGRAM(s) Oral three times a day  enoxaparin Injectable 80 milliGRAM(s) SubCutaneous <User Schedule> 1000 and 2000  insulin lispro (HumaLOG) corrective regimen sliding scale   SubCutaneous three times a day before meals  insulin lispro (HumaLOG) corrective regimen sliding scale   SubCutaneous at bedtime  magnesium oxide 400 milliGRAM(s) Oral three times a day with meals  multivitamin 1 Tablet(s) Oral daily  mycophenolate mofetil 1000 milliGRAM(s) Oral <User Schedule>  nystatin    Suspension 299470 Unit(s) Oral every 6 hours  pantoprazole    Tablet 40 milliGRAM(s) Oral before breakfast  pravastatin 20 milliGRAM(s) Oral at bedtime  predniSONE   Tablet 10 milliGRAM(s) Oral every 12 hours  silver sulfADIAZINE 1% Cream 1 Application(s) Topical two times a day  tacrolimus 8 milliGRAM(s) Oral <User Schedule>  valGANciclovir 450 milliGRAM(s) Oral every 12 hours  vancomycin  IVPB 1000 milliGRAM(s) IV Intermittent every 12 hours    MEDICATIONS  (PRN):  acetaminophen   Tablet. 650 milliGRAM(s) Oral every 6 hours PRN Mild Pain (1 - 3) 67M -PMH: GIB, LVAD implant, afib, anemia AICD, CHF  2/23/18 s/p  Heart transplant  Intra op acute rejection of graft and Intra-op IABP placed  and removed POD 1 and received plasmaphoresis x 2  2/26 UE +RT IJ inominate SC,cephalic DVT and +lt SC DVT  3/1  cardiac bx low grade rejection  3/2 bronchoscopy   neg   3/4  t/c too floor,  Imipenum for low grade fevers  3/5 Sputum + PSA, ID to see pts/p  Heart transplant 2/23/18   Vac changed to RLQ  3/6 Pt on cefepime  3/8 Cardiac biopsy this am - prograf level - 7- prograf level increased to 8mg po bid  toprol d/c'd,  Cardizem CD 120mg po initiated  high filling pressures noted on right heart cath-lasix 40mg po daily started as per HF  K+= 6 - Bactrim d/c'd - Mepron to start in am for prophylaxis  rpt k+ = 5.2-  daily prograf levels, daily CXR, bid CMP & CBC  plastics consult for ?necrotic right 3rd digit  Cardiac biopsy results pending- depending on biopsy results - HF to adjust prednisone taper  d/c planning  3/9 prednisone 12.5 bid.  Lasix x 1 in am tomorrow, then d/c.   Prograf 10.4, level increased from 7.9 so will need a prograf level tonight at 7 pm, Dr. Mccabe to be notified.  K elevated , if continues to rise may need  kaexalate  Finger with  possible necrosis 3rd R.  Tophus noted by rheumatology,+ crystals  specimen sent.  Hand /plastics consulted, finger  drained, may need further intervention  3/10  WBC to 29   afebrile,  ct abdomen  chest pelvis ordered,   Prograf level 11,  vss   3/11 wbc trending down.   CT Chest/Pelvis:   Small fluid collection inferior to the xiphoid process likely the site of   prior drain.-7 mm nodule in the lingula, new from prior imaging.  No evidence of infection in the abdomen or pelvis.    Hct down , if  decreases further on repeat labs , PRBC to be given  Finger  with less discomfort, no drainage today unable to culture.  Minimal drainage after it was drained on  3/9, culture not sent during   BC & UA neg 3/9 neg to date  Driveline site  cx negative from 3/5  Tacro level 14.2, D/W hf , MAINTAIN CURRENT DOSAGE  Cont IV abx  Cont lovenox for dvt  3/12 Pt with decreased hct, 1uprbc being given.  Plastics f/u , would use silvadine  on finger, no further intervention at this time  3/13 The pt ambulated on stairs.  Prograf level 10  3/12/18 Rpt. duplex: [facility] Duplex Upper Ext Vein Scan, Bilat   Nonocclusive DVT of the R internal jugular vein. Persistent occlusive   DVT in the innominate and subclavian veins. Superficial thrombosis of the   R cephalic vein.  Persistent occlusive DVT L subclavian vein. Nonocclusive DVT of   the L internal jugular and innominate veins. Nonocclusive DVT of the   axillary and brachial vein surrounding a left PICC.    Studies reviewed w Dr Tavarez, will continue lvx 80 bid  3/14 Cardiac Biopsy, resume lovenox tonight  3/14 - Bronch performed bedside by DR. Sutherland:  Report as follows:  RLL collapse with persistent wbc , for bronch today  with Dr Sutherland.  Prograf 12, dose remains at 8mg bid  /72- 125/77  with Cardizem increased yesterday to 180mg daily  PT changed wound vac  3/15 Pt reports feeling better today. K+5.6 overnight, repeat 4.6 this AM, will cont to monitor.    AFB and cultures 3/14  results pending.   Prednisone taper 10 mg today. Midline d/c'd. PIV placed.   Prograf level 15.6 will discuss decrease in Prograf dose with Dr. Annabelle Mccabe  d/c planning anticipate Home Monday 3/19/18

## 2018-03-15 NOTE — PROGRESS NOTE ADULT - SUBJECTIVE AND OBJECTIVE BOX
PAGER:  [phone]               [facility] [address]              EMAIL [email or web address]   OFFICE [phone]                              ********VASCULAR MEDICINE PROGRESS NOTE********                        CC:  UE DVT    INTERVAL HISTORY:  Left midline removed.  No CP or SOB.  R arm with swelling, dressing is currently off    MEDICATIONS  (STANDING):  ALBUTerol/ipratropium for Nebulization 3 milliLiter(s) Nebulizer every 6 hours  aspirin enteric coated 81 milliGRAM(s) Oral daily  atovaquone Suspension 1500 milliGRAM(s) Oral daily  Calcium Citrate + Vit D, 315 mg/200 Unit 2 Tablet(s) 2 Tablet(s) Oral two times a day  cefepime  IVPB      cefepime  IVPB 2000 milliGRAM(s) IV Intermittent every 8 hours  diltiazem    milliGRAM(s) Oral daily  docusate sodium 100 milliGRAM(s) Oral three times a day  enoxaparin Injectable 80 milliGRAM(s) SubCutaneous <User Schedule>  insulin lispro (HumaLOG) corrective regimen sliding scale   SubCutaneous three times a day before meals  insulin lispro (HumaLOG) corrective regimen sliding scale   SubCutaneous at bedtime  magnesium oxide 400 milliGRAM(s) Oral three times a day with meals  multivitamin 1 Tablet(s) Oral daily  mycophenolate mofetil 1000 milliGRAM(s) Oral <User Schedule>  nystatin    Suspension 576541 Unit(s) Oral every 6 hours  pantoprazole    Tablet 40 milliGRAM(s) Oral before breakfast  pravastatin 20 milliGRAM(s) Oral at bedtime  predniSONE   Tablet 10 milliGRAM(s) Oral every 12 hours  silver sulfADIAZINE 1% Cream 1 Application(s) Topical two times a day  tacrolimus 8 milliGRAM(s) Oral <User Schedule>  valGANciclovir 450 milliGRAM(s) Oral every 12 hours  vancomycin  IVPB 1000 milliGRAM(s) IV Intermittent every 12 hours    MEDICATIONS  (PRN):  acetaminophen   Tablet. 650 milliGRAM(s) Oral every 6 hours PRN Mild Pain (1 - 3)    FAMILY HISTORY:  No pertinent family history in first degree relatives      SOCIAL HISTORY:  unchanged    REVIEW OF SYSTEMS:  CONSTITUTIONAL: No fever, weight loss, or fatigue  EYES: No eye pain, visual disturbances, or discharge  ENMT:  No difficulty hearing, tinnitus, vertigo; No sinus or throat pain  NECK: No pain or stiffness  RESPIRATORY: No cough, wheezing, chills or hemoptysis; No Shortness of Breath  CARDIOVASCULAR: No chest pain, palpitations, passing out, dizziness, or leg swelling  GASTROINTESTINAL: No abdominal or epigastric pain. No nausea, vomiting, or hematemesis; No diarrhea or constipation. No melena or hematochezia.  GENITOURINARY: No dysuria, frequency, hematuria, or incontinence  NEUROLOGICAL: No headaches, memory loss, loss of strength, numbness, or tremors  SKIN: No itching, burning, rashes, or lesions   LYMPH Nodes: No enlarged glands  ENDOCRINE: No heat or cold intolerance; No hair loss  MUSCULOSKELETAL: No joint pain or swelling; No muscle, back, or extremity pain  PSYCHIATRIC: No depression, anxiety, mood swings, or difficulty sleeping  HEME/LYMPH: No easy bruising, or bleeding gums  ALLERY AND IMMUNOLOGIC: No hives or eczema	    [x ] All others negative	  [ ] Unable to obtain      ICU Vital Signs Last 24 Hrs  T(C): 36.3 (15 Mar 2018 10:10), Max: 36.4 (15 Mar 2018 03:26)  T(F): 97.3 (15 Mar 2018 10:10), Max: 97.6 (15 Mar 2018 03:26)  HR: 102 (15 Mar 2018 10:10) (92 - 107)  BP: 103/63 (15 Mar 2018 10:10) (103/63 - 131/73)  BP(mean): 87 (15 Mar 2018 05:14) (79 - 95)  ABP: --  ABP(mean): --  RR: 16 (15 Mar 2018 10:10) (15 - 18)  SpO2: 99% (15 Mar 2018 10:10) (96% - 100%)      Appearance: Normal, on high flow oxgyen  HEENT:   Normal oral mucosa, PERRL, EOMI	  Lymphatic: No lymphadenopathy  Cardiovascular: Normal S1 S2 .   Respiratory: clear	  Psychiatry: A & O x 3, Mood & affect appropriate  Gastrointestinal:  Soft, Non-tender, + BS	  Skin: No rashes, No ecchymoses, No cyanosis	  Neurologic: Non-focal  Extremities: RUE edema.  3rd digit R hand ischemia, motion and sensation intact. Dressing c/d/i                                       9.2    23.5  )-----------( 407      ( 15 Mar 2018 07:18 )             27.8   03-15    136  |  105  |  31<H>  ----------------------------<  158<H>  4.6   |  17<L>  |  1.41<H>    Ca    8.7      15 Mar 2018 07:18  Phos  3.2     03-14  Mg     1.6     03-14    TPro  6.2  /  Alb  2.8<L>  /  TBili  0.5  /  DBili  x   /  AST  18  /  ALT  29  /  AlkPhos  95  03-15

## 2018-03-15 NOTE — PROGRESS NOTE ADULT - PROBLEM SELECTOR PLAN 2
3rd digit right hand w/ ?embolic event-discolorization  plastics debrided for pus  topical silvadene   will continue to monitor 3rd digit right hand w/ ?embolic event-discolorization  plastics debrided for pus  topical silvadene per plastics  will continue to monitor

## 2018-03-15 NOTE — PROGRESS NOTE ADULT - ASSESSMENT
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 Underwent biopsy #1 which showed CMR 1R, AMR 1 (no cellular injury). Repeat DSA negative. S/P RHC and EMB 3/14. RHC RA 14, PA 40/16/27, wedge 11, Pasat 65%       #Hyperkalemia  - K 4.6 this morning.  - Remains on Mepron due to hyperkalemia.  - low K diet    #Hypertension  - Well controlled on augmented dose of diltiazem 180 PO daily    # Immunosuppression prophylaxis:  Tacrolimus - Continuing 8mg Q12. check levels daily with goal 12-14.   Continue CellCept 1000 mg PO BID  Steroids - continue prednisone 12.5mg PO BID  Mag oxide 400mg PO TID as magnesium noted to be 1.6 on 3/12 check level   	  # Antimicrobial prophylaxis:  Intermediate risk CMV - on oral valganciclovir 450 mg q12  Intermediate risk Toxo - on Mepron  PCP - on Mepron    # Graft function:  biopsy #1 which showed CMR 1R, AMR 1 (no cellular injury). 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 Underwent biopsy #1 which showed CMR 1R, AMR 1 (no cellular injury). Repeat DSA negative. S/P RHC and EMB 3/14. RHC RA 14, PA 40/16/27, wedge 11, Pasat 65%       #Hyperkalemia  - K 4.6 this morning.  - Remains on Mepron due to hyperkalemia.  - low K diet    #Hypertension  - Well controlled on augmented dose of diltiazem 180 PO daily    # Immunosuppression prophylaxis:  Tacrolimus - Continuing 8mg Q12. check levels daily with goal 12-14.   Continue CellCept 1000 mg PO BID  Steroids - continue prednisone 12.5mg PO BID  Mag oxide 400mg PO TID as magnesium noted to be 1.6 on 3/12 check level   	  # Antimicrobial prophylaxis:  Intermediate risk CMV - on oral valganciclovir 450 mg q12  Intermediate risk Toxo - on Mepron  PCP - on Mepron    # Graft function:  biopsy #1 which showed CMR 1R, AMR 1 (no cellular injury). 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   - HCV viral load and PCR sent per ID  - ID following    #endo  - BG generally well controlled ranging   - SSI    #dispo  - Ongoing work with PT for ongoing exercise tolerance prior to d/c This is a 67 year old man with ACC/AHA stage D chronic systolic heart failure due to NICM (LVEF 20%) s/p HM2 LVAD 6/17 c/b pump thrombus now 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 Underwent biopsy #1 which showed CMR 1R, AMR 1 (no cellular injury). Repeat DSA negative. S/P RHC and EMB 3/14. RHC RA 14, PA 40/16/27, wedge 11, Pasat 65%       #Hyperkalemia  - K 4.6 this morning.  - Remains on Mepron due to hyperkalemia.  - low K diet    #Hypertension  - Well controlled on augmented dose of diltiazem 180 PO daily    # Immunosuppression prophylaxis:  Tacrolimus - Reduce dose to 7 mg Q12. check levels daily with goal 12-14.   Continue CellCept 1000 mg PO BID  Steroids - continue prednisone 12.5mg PO BID  Mag oxide 400mg PO TID as magnesium noted to be 1.6 on 3/12 check level   	  # Antimicrobial prophylaxis:  Intermediate risk CMV - on oral valganciclovir 450 mg q12  Intermediate risk Toxo - on Mepron  PCP - on Mepron    # Graft function:  biopsy #1 which showed CMR 1R, AMR 1 (no cellular injury). 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   - HCV viral load and PCR sent per ID  - ID following    #endo  - BG generally well controlled ranging   - SSI    #dispo  - Ongoing work with PT for ongoing exercise tolerance prior to d/c

## 2018-03-15 NOTE — PROGRESS NOTE ADULT - ASSESSMENT
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     Upper extremity DVT  - Continue Lovenox 1mg/kg BID  (creatinine clearance is 55ml/min today using creatinine of 1.41)  - elevate arms above heart level and continue with light compression to R arm with 4 inch ace wrap from hand to axilla.  Discussed with his nurse  - minimize time off a/c  - weekly surveillance of upper extremity VTE due to time off lovenox and proximal nature of clot        Daysi  [id number]

## 2018-03-15 NOTE — PROGRESS NOTE ADULT - PROBLEM SELECTOR PLAN 1
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 S/p  heart transplant  2/23  on rejection meds with HF following,  -Tacrolimus level 15.6 this am - will discuss dosage change with DR. 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

## 2018-03-16 LAB
ALBUMIN SERPL ELPH-MCNC: 2.9 G/DL — LOW (ref 3.3–5)
ALP SERPL-CCNC: 86 U/L — SIGNIFICANT CHANGE UP (ref 40–120)
ALT FLD-CCNC: 26 U/L RC — SIGNIFICANT CHANGE UP (ref 10–45)
ANION GAP SERPL CALC-SCNC: 12 MMOL/L — SIGNIFICANT CHANGE UP (ref 5–17)
ANION GAP SERPL CALC-SCNC: 14 MMOL/L — SIGNIFICANT CHANGE UP (ref 5–17)
AST SERPL-CCNC: 16 U/L — SIGNIFICANT CHANGE UP (ref 10–40)
BILIRUB SERPL-MCNC: 0.6 MG/DL — SIGNIFICANT CHANGE UP (ref 0.2–1.2)
BUN SERPL-MCNC: 37 MG/DL — HIGH (ref 7–23)
BUN SERPL-MCNC: 41 MG/DL — HIGH (ref 7–23)
CALCIUM SERPL-MCNC: 8.6 MG/DL — SIGNIFICANT CHANGE UP (ref 8.4–10.5)
CALCIUM SERPL-MCNC: 9 MG/DL — SIGNIFICANT CHANGE UP (ref 8.4–10.5)
CHLORIDE SERPL-SCNC: 107 MMOL/L — SIGNIFICANT CHANGE UP (ref 96–108)
CHLORIDE SERPL-SCNC: 107 MMOL/L — SIGNIFICANT CHANGE UP (ref 96–108)
CO2 SERPL-SCNC: 17 MMOL/L — LOW (ref 22–31)
CO2 SERPL-SCNC: 18 MMOL/L — LOW (ref 22–31)
CREAT SERPL-MCNC: 1.86 MG/DL — HIGH (ref 0.5–1.3)
CREAT SERPL-MCNC: 2.15 MG/DL — HIGH (ref 0.5–1.3)
GLUCOSE BLDC GLUCOMTR-MCNC: 106 MG/DL — HIGH (ref 70–99)
GLUCOSE BLDC GLUCOMTR-MCNC: 117 MG/DL — HIGH (ref 70–99)
GLUCOSE BLDC GLUCOMTR-MCNC: 117 MG/DL — HIGH (ref 70–99)
GLUCOSE BLDC GLUCOMTR-MCNC: 142 MG/DL — HIGH (ref 70–99)
GLUCOSE SERPL-MCNC: 118 MG/DL — HIGH (ref 70–99)
GLUCOSE SERPL-MCNC: 90 MG/DL — SIGNIFICANT CHANGE UP (ref 70–99)
HCT VFR BLD CALC: 27.5 % — LOW (ref 39–50)
HCV GENTYP BLD NAA+PROBE: ABNORMAL
HCV RNA SERPL QL NAA+PROBE: DETECTED
HGB BLD-MCNC: 9 G/DL — LOW (ref 13–17)
MCHC RBC-ENTMCNC: 30.1 PG — SIGNIFICANT CHANGE UP (ref 27–34)
MCHC RBC-ENTMCNC: 32.6 GM/DL — SIGNIFICANT CHANGE UP (ref 32–36)
MCV RBC AUTO: 92.3 FL — SIGNIFICANT CHANGE UP (ref 80–100)
PLATELET # BLD AUTO: 414 K/UL — HIGH (ref 150–400)
POTASSIUM SERPL-MCNC: 4.9 MMOL/L — SIGNIFICANT CHANGE UP (ref 3.5–5.3)
POTASSIUM SERPL-MCNC: 5.2 MMOL/L — SIGNIFICANT CHANGE UP (ref 3.5–5.3)
POTASSIUM SERPL-SCNC: 4.9 MMOL/L — SIGNIFICANT CHANGE UP (ref 3.5–5.3)
POTASSIUM SERPL-SCNC: 5.2 MMOL/L — SIGNIFICANT CHANGE UP (ref 3.5–5.3)
PROT SERPL-MCNC: 6.2 G/DL — SIGNIFICANT CHANGE UP (ref 6–8.3)
RBC # BLD: 2.98 M/UL — LOW (ref 4.2–5.8)
RBC # FLD: 16.7 % — HIGH (ref 10.3–14.5)
SODIUM SERPL-SCNC: 136 MMOL/L — SIGNIFICANT CHANGE UP (ref 135–145)
SODIUM SERPL-SCNC: 139 MMOL/L — SIGNIFICANT CHANGE UP (ref 135–145)
TACROLIMUS SERPL-MCNC: 13.4 NG/ML — SIGNIFICANT CHANGE UP
VANCOMYCIN FLD-MCNC: 32.7 UG/ML
WBC # BLD: 17.1 K/UL — HIGH (ref 3.8–10.5)
WBC # FLD AUTO: 17.1 K/UL — HIGH (ref 3.8–10.5)

## 2018-03-16 PROCEDURE — 71045 X-RAY EXAM CHEST 1 VIEW: CPT | Mod: 26

## 2018-03-16 PROCEDURE — 99233 SBSQ HOSP IP/OBS HIGH 50: CPT

## 2018-03-16 PROCEDURE — 99232 SBSQ HOSP IP/OBS MODERATE 35: CPT

## 2018-03-16 PROCEDURE — 93306 TTE W/DOPPLER COMPLETE: CPT | Mod: 26

## 2018-03-16 RX ORDER — DAPTOMYCIN 500 MG/10ML
500 INJECTION, POWDER, LYOPHILIZED, FOR SOLUTION INTRAVENOUS EVERY 24 HOURS
Qty: 0 | Refills: 0 | Status: COMPLETED | OUTPATIENT
Start: 2018-03-16 | End: 2018-03-16

## 2018-03-16 RX ORDER — VALGANCICLOVIR 450 MG/1
450 TABLET, FILM COATED ORAL
Qty: 0 | Refills: 0 | Status: DISCONTINUED | OUTPATIENT
Start: 2018-03-16 | End: 2018-03-20

## 2018-03-16 RX ORDER — FUROSEMIDE 40 MG
80 TABLET ORAL ONCE
Qty: 0 | Refills: 0 | Status: COMPLETED | OUTPATIENT
Start: 2018-03-16 | End: 2018-03-16

## 2018-03-16 RX ORDER — FUROSEMIDE 40 MG
40 TABLET ORAL ONCE
Qty: 0 | Refills: 0 | Status: DISCONTINUED | OUTPATIENT
Start: 2018-03-16 | End: 2018-03-16

## 2018-03-16 RX ORDER — FUROSEMIDE 40 MG
20 TABLET ORAL ONCE
Qty: 0 | Refills: 0 | Status: COMPLETED | OUTPATIENT
Start: 2018-03-16 | End: 2018-03-16

## 2018-03-16 RX ADMIN — Medication 3 MILLILITER(S): at 06:05

## 2018-03-16 RX ADMIN — ENOXAPARIN SODIUM 80 MILLIGRAM(S): 100 INJECTION SUBCUTANEOUS at 21:52

## 2018-03-16 RX ADMIN — MAGNESIUM OXIDE 400 MG ORAL TABLET 400 MILLIGRAM(S): 241.3 TABLET ORAL at 08:03

## 2018-03-16 RX ADMIN — MYCOPHENOLATE MOFETIL 1000 MILLIGRAM(S): 250 CAPSULE ORAL at 08:01

## 2018-03-16 RX ADMIN — Medication 500000 UNIT(S): at 06:18

## 2018-03-16 RX ADMIN — Medication 250 MILLIGRAM(S): at 06:14

## 2018-03-16 RX ADMIN — MYCOPHENOLATE MOFETIL 1000 MILLIGRAM(S): 250 CAPSULE ORAL at 19:57

## 2018-03-16 RX ADMIN — MAGNESIUM OXIDE 400 MG ORAL TABLET 400 MILLIGRAM(S): 241.3 TABLET ORAL at 11:34

## 2018-03-16 RX ADMIN — Medication 3 MILLILITER(S): at 11:33

## 2018-03-16 RX ADMIN — CEFEPIME 100 MILLIGRAM(S): 1 INJECTION, POWDER, FOR SOLUTION INTRAMUSCULAR; INTRAVENOUS at 05:30

## 2018-03-16 RX ADMIN — Medication 10 MILLIGRAM(S): at 06:17

## 2018-03-16 RX ADMIN — Medication 1 APPLICATION(S): at 06:19

## 2018-03-16 RX ADMIN — Medication 180 MILLIGRAM(S): at 06:16

## 2018-03-16 RX ADMIN — TACROLIMUS 7 MILLIGRAM(S): 5 CAPSULE ORAL at 19:56

## 2018-03-16 RX ADMIN — PANTOPRAZOLE SODIUM 40 MILLIGRAM(S): 20 TABLET, DELAYED RELEASE ORAL at 06:17

## 2018-03-16 RX ADMIN — ENOXAPARIN SODIUM 80 MILLIGRAM(S): 100 INJECTION SUBCUTANEOUS at 10:01

## 2018-03-16 RX ADMIN — Medication 500000 UNIT(S): at 17:52

## 2018-03-16 RX ADMIN — Medication 10 MILLIGRAM(S): at 17:53

## 2018-03-16 RX ADMIN — ATOVAQUONE 1500 MILLIGRAM(S): 750 SUSPENSION ORAL at 14:01

## 2018-03-16 RX ADMIN — Medication 3 MILLILITER(S): at 17:52

## 2018-03-16 RX ADMIN — Medication 1 TABLET(S): at 11:33

## 2018-03-16 RX ADMIN — VALGANCICLOVIR 450 MILLIGRAM(S): 450 TABLET, FILM COATED ORAL at 08:02

## 2018-03-16 RX ADMIN — DAPTOMYCIN 120 MILLIGRAM(S): 500 INJECTION, POWDER, LYOPHILIZED, FOR SOLUTION INTRAVENOUS at 19:57

## 2018-03-16 RX ADMIN — TACROLIMUS 7 MILLIGRAM(S): 5 CAPSULE ORAL at 08:02

## 2018-03-16 RX ADMIN — Medication 81 MILLIGRAM(S): at 11:33

## 2018-03-16 RX ADMIN — MAGNESIUM OXIDE 400 MG ORAL TABLET 400 MILLIGRAM(S): 241.3 TABLET ORAL at 19:56

## 2018-03-16 RX ADMIN — CEFEPIME 100 MILLIGRAM(S): 1 INJECTION, POWDER, FOR SOLUTION INTRAMUSCULAR; INTRAVENOUS at 13:59

## 2018-03-16 RX ADMIN — Medication 1 APPLICATION(S): at 18:47

## 2018-03-16 RX ADMIN — Medication 80 MILLIGRAM(S): at 18:52

## 2018-03-16 RX ADMIN — Medication 500000 UNIT(S): at 11:34

## 2018-03-16 RX ADMIN — Medication 20 MILLIGRAM(S): at 11:32

## 2018-03-16 RX ADMIN — Medication 20 MILLIGRAM(S): at 21:52

## 2018-03-16 NOTE — PROGRESS NOTE ADULT - SUBJECTIVE AND OBJECTIVE BOX
Learner: patient  Barriers: the patient is not able to read    Patient received a cardiac transplant.    Method used: Verbal discussion, medication cards and written materials    Medication safety was discussed with the patient: allergies, herbals, adherence, interactions, medication precautions, miss dose instructions, purpose, side effects, signs and symptoms to report, and storage & handling    Patient was able to repeat and verbalize key points. He was able to recall what medications are used for rejection versus prophylaxis.     Education Summary: Discharge immunosuppressant medications and prophylatic anti-infective agents reviewed with the patient. Outpatient medication schedule was discussed in detail including: medication name, indication, dose, administration times, treatment duration, side effects, drug interactions, and special instructions. Patient questions and concerns were answered and addressed. Patient demonstrated understanding. The learning was based on remembering the names of medications, how medications look (visualized memory) and color coding was introduced as well.     Time spent discharge education: 30 minutes    MEDICATIONS  (STANDING):  ALBUTerol/ipratropium for Nebulization 3 milliLiter(s) Nebulizer every 6 hours  aspirin enteric coated 81 milliGRAM(s) Oral daily  atovaquone Suspension 1500 milliGRAM(s) Oral daily  Calcium Citrate + Vit D, 315 mg/200 Unit 2 Tablet(s) 2 Tablet(s) Oral two times a day  DAPTOmycin IVPB 500 milliGRAM(s) IV Intermittent every 24 hours  diltiazem    milliGRAM(s) Oral daily  docusate sodium 100 milliGRAM(s) Oral three times a day  enoxaparin Injectable 80 milliGRAM(s) SubCutaneous <User Schedule>  furosemide   Injectable 40 milliGRAM(s) IV Push once  insulin lispro (HumaLOG) corrective regimen sliding scale   SubCutaneous three times a day before meals  insulin lispro (HumaLOG) corrective regimen sliding scale   SubCutaneous at bedtime  magnesium oxide 400 milliGRAM(s) Oral three times a day with meals  multivitamin 1 Tablet(s) Oral daily  mycophenolate mofetil 1000 milliGRAM(s) Oral <User Schedule>  nystatin    Suspension 062543 Unit(s) Oral every 6 hours  pantoprazole    Tablet 40 milliGRAM(s) Oral before breakfast  pravastatin 20 milliGRAM(s) Oral at bedtime  predniSONE   Tablet 10 milliGRAM(s) Oral every 12 hours  silver sulfADIAZINE 1% Cream 1 Application(s) Topical two times a day  tacrolimus 7 milliGRAM(s) Oral <User Schedule>  valGANciclovir 450 milliGRAM(s) Oral <User Schedule>    MEDICATIONS  (PRN):  acetaminophen   Tablet. 650 milliGRAM(s) Oral every 6 hours PRN Mild Pain (1 - 3)      Cardiac Transplant Medications:  Tacrolimus adjusted to trough (currently on 7 mg PO q12h)  Mycophenolate mofetil 1,000 mg PO twice a day  Prednisone taper   Atovaquone 1,500 mg PO daily   Nystatin 5,000 units/5 mL swish and swallow four times a day  Valganciclovir 450 mg q48h - the dose was adjusted based on the recent increase in SCr   Docusate and Senna                          9.0    17.1  )-----------( 414      ( 16 Mar 2018 07:18 )             27.5     03-16    136  |  107  |  37<H>  ----------------------------<  118<H>  4.9   |  17<L>  |  1.86<H>    Ca    8.6      16 Mar 2018 07:18    TPro  6.2  /  Alb  2.9<L>  /  TBili  0.6  /  DBili  x   /  AST  16  /  ALT  26  /  AlkPhos  86  03-16    LIVER FUNCTIONS - ( 16 Mar 2018 07:18 )  Alb: 2.9 g/dL / Pro: 6.2 g/dL / ALK PHOS: 86 U/L / ALT: 26 U/L RC / AST: 16 U/L / GGT: x           Tacrolimus (), Serum: 13.4 ng/mL (03-16-18 @ 07:51)  Tacrolimus (), Serum: 15.6 ng/mL (03-15-18 @ 07:49)  Tacrolimus (), Serum: 12.0 ng/mL (03-14-18 @ 08:09)

## 2018-03-16 NOTE — PROGRESS NOTE ADULT - ASSESSMENT
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     Upper extremity DVT  - Continue Lovenox 1mg/kg BID   -Creatinine clearance is 43ml/min today, continue with current dose  - if creatinine clearnace is less than 30 would switch to once daily dosing lovenox  - ACE wraps for RUE swelling and elevation    Galmer  [id number]

## 2018-03-16 NOTE — PROGRESS NOTE ADULT - SUBJECTIVE AND OBJECTIVE BOX
Subjective: Reports feeling okay today. He has not yet been up out of bed today. Noted worsening dyspnea with ambulation/stairs while working with PT compared to day prior. Endorses infrequent productive cough with white sputum. Denies SOB at rest, lightheadedness, dizziness, orthopnea, PND, abdominal fullness/bloating, fevers, chills.    Medications:  acetaminophen   Tablet. 650 milliGRAM(s) Oral every 6 hours PRN  ALBUTerol/ipratropium for Nebulization 3 milliLiter(s) Nebulizer every 6 hours  aspirin enteric coated 81 milliGRAM(s) Oral daily  atovaquone Suspension 1500 milliGRAM(s) Oral daily  Calcium Citrate + Vit D, 315 mg/200 Unit 2 Tablet(s) 2 Tablet(s) Oral two times a day  cefepime  IVPB      cefepime  IVPB 2000 milliGRAM(s) IV Intermittent every 8 hours  diltiazem    milliGRAM(s) Oral daily  docusate sodium 100 milliGRAM(s) Oral three times a day  enoxaparin Injectable 80 milliGRAM(s) SubCutaneous <User Schedule>  insulin lispro (HumaLOG) corrective regimen sliding scale   SubCutaneous three times a day before meals  insulin lispro (HumaLOG) corrective regimen sliding scale   SubCutaneous at bedtime  magnesium oxide 400 milliGRAM(s) Oral three times a day with meals  multivitamin 1 Tablet(s) Oral daily  mycophenolate mofetil 1000 milliGRAM(s) Oral <User Schedule>  nystatin    Suspension 286155 Unit(s) Oral every 6 hours  pantoprazole    Tablet 40 milliGRAM(s) Oral before breakfast  pravastatin 20 milliGRAM(s) Oral at bedtime  predniSONE   Tablet 10 milliGRAM(s) Oral every 12 hours  silver sulfADIAZINE 1% Cream 1 Application(s) Topical two times a day  tacrolimus 7 milliGRAM(s) Oral <User Schedule>  valGANciclovir 450 milliGRAM(s) Oral every 12 hours  vancomycin  IVPB 1000 milliGRAM(s) IV Intermittent every 12 hours      Physical Exam:    Vitals:  Vital Signs Last 24 Hours  T(C): 36.4 (03-16-18 @ 08:08), Max: 36.9 (03-15-18 @ 23:43)  HR: 108 (03-16-18 @ 08:08) (96 - 108)  BP: 113/74 (03-16-18 @ 08:08) (103/63 - 119/74)  RR: 18 (03-16-18 @ 08:08) (16 - 18)  SpO2: 99% (03-16-18 @ 08:08) (94% - 99%)    I&O's Summary    15 Mar 2018 07:01  -  16 Mar 2018 07:00  --------------------------------------------------------  IN: 960 mL / OUT: 950 mL / NET: 10 mL    Tele: - ST HR     General: Lying in bed. No distress. Comfortable.  Neck: Neck supple. JVP ~ 10cm H2O.  Chest: Diminished RLL, otherwise CTA bilaterally.  CV: Tachycardic, S1, S2. No murmurs, rub, or gallops. RUE mild edema, ace wrap in place. No BLE edema.  Abdomen: rounded, soft, non-distended, non-tender, + BS  Skin:RLQ wound vac in place without drainage. Midline abdominal wound with gauze in place c/d/i.  Sternal incision with steristrips, small area of superficial dehiscence at top of sternal incision, no drainage, remainder of sternal incision well approximated with steristrips in place. No erythema or drainage noted.  Neurology: Alert and oriented times three. Sensation intact  Psych: Affect normal    Labs:                        9.0    17.1  )-----------( 414      ( 16 Mar 2018 07:18 )             27.5     03-16    136  |  107  |  37<H>  ----------------------------<  118<H>  4.9   |  17<L>  |  1.86<H>    Ca    8.6      16 Mar 2018 07:18    TPro  6.2  /  Alb  2.9<L>  /  TBili  0.6  /  DBili  x   /  AST  16  /  ALT  26  /  AlkPhos  86  03-16    Culture - Sputum . (03.14.18 @ 21:44)    Gram Stain:   No polymorphonuclear leukocytes per low power field  Few Squamous epithelial cells per low power field  Few Gram Positive Cocci in Clusters per oil power field  Results consistent with oropharyngeal contamination    Specimen Source: .Sputum Sputum    Culture Results:   No growth to date.    Hepatitis C Virus RNA Detection by PCR (03.13.18 @ 23:47)    Hepatitis C RNA, Log Value: 4.34: HCV RNA Quantification by RT-PCR using Renee m2000:  Assay dynamic range:  12 IU/mL to 100,000,000 HCV RNA IU/mL  1.08 (log10) IU/mL to 8.00 (log10) IU/mL  Assay reference range: Not Detected  The results of this test should be interpreted with consideration of all  clinical and laboratory findings. A result of  "No HCV RNA Detected" does  not preclude the possibility of infection with hepatitis C virus.  In  particular, caution should be used when interpreting low level positive  results when the test is used for diagnostic purposes. LogIU/mL    Hepatitis C Virus RNA Detection by PCR: 66056 IU/mL

## 2018-03-16 NOTE — PROGRESS NOTE ADULT - ASSESSMENT
67M -PMH: GIB, LVAD implant, afib, anemia AICD, CHF  2/23/18 s/p  Heart transplant  Intra op acute rejection of graft and Intra-op IABP placed  and removed POD 1 and received plasmaphoresis x 2  2/26 UE +RT IJ inominate SC,cephalic DVT and +lt SC DVT  3/1  cardiac bx low grade rejection  3/2 bronchoscopy   neg   3/4  t/c too floor,  Imipenum for low grade fevers  3/5 Sputum + PSA, ID to see pts/p  Heart transplant 2/23/18   Vac changed to RLQ  3/6 Pt on cefepime  3/8 Cardiac biopsy this am - prograf level - 7- prograf level increased to 8mg po bid  toprol d/c'd,  Cardizem CD 120mg po initiated  high filling pressures noted on right heart cath-lasix 40mg po daily started as per HF  K+= 6 - Bactrim d/c'd - Mepron to start in am for prophylaxis  rpt k+ = 5.2-  daily prograf levels, daily CXR, bid CMP & CBC  plastics consult for ?necrotic right 3rd digit  Cardiac biopsy results pending- depending on biopsy results - HF to adjust prednisone taper  d/c planning  3/9 prednisone 12.5 bid.  Lasix x 1 in am tomorrow, then d/c.   Prograf 10.4, level increased from 7.9 so will need a prograf level tonight at 7 pm, Dr. Mccabe to be notified.  K elevated , if continues to rise may need  kaexalate  Finger with  possible necrosis 3rd R.  Tophus noted by rheumatology,+ crystals  specimen sent.  Hand /plastics consulted, finger  drained, may need further intervention  3/10  WBC to 29   afebrile,  ct abdomen  chest pelvis ordered,   Prograf level 11,  vss   3/11 wbc trending down.   CT Chest/Pelvis:   Small fluid collection inferior to the xiphoid process likely the site of   prior drain.-7 mm nodule in the lingula, new from prior imaging.  No evidence of infection in the abdomen or pelvis.    Hct down , if  decreases further on repeat labs , PRBC to be given  Finger  with less discomfort, no drainage today unable to culture.  Minimal drainage after it was drained on  3/9, culture not sent during   BC & UA neg 3/9 neg to date  Driveline site  cx negative from 3/5  Tacro level 14.2, D/W hf , MAINTAIN CURRENT DOSAGE  Cont IV abx  Cont lovenox for dvt  3/12 Pt with decreased hct, 1uprbc being given.  Plastics f/u , would use silvadine  on finger, no further intervention at this time  3/13 The pt ambulated on stairs.  Prograf level 10  3/12/18 Rpt. duplex: [facility] Duplex Upper Ext Vein Scan, Bilat   Nonocclusive DVT of the R internal jugular vein. Persistent occlusive   DVT in the innominate and subclavian veins. Superficial thrombosis of the   R cephalic vein.  Persistent occlusive DVT L subclavian vein. Nonocclusive DVT of   the L internal jugular and innominate veins. Nonocclusive DVT of the   axillary and brachial vein surrounding a left PICC.    Studies reviewed w Dr Tavarez, will continue lvx 80 bid  3/14 Cardiac Biopsy, resume lovenox tonight  3/14 -   RLL collapse with persistent wbc , for bronch today  with Dr Sutherland, completed  Prograf 12, dose remains at 8mg bid  /72- 125/77  with Cardizem increased yesterday to 180mg daily  PT changed wound vac  3/15 Pt reports feeling better today. K+5.6 overnight, repeat 4.6 this AM, will cont to monitor.    AFB and cultures 3/14  results pending.   Prednisone taper 10 mg today. Midline d/c'd. PIV placed.   Prograf level 15.6 will discuss decrease in Prograf dose with Dr. Annabelle Mccabe  d/c planning anticipate Home Monday 3/19/18  3/16  tacro level 13.4 on decreased prograff dose of 7.  The Midline was d/c , PIV placed.  Prednisone was decreased  on 3/14.  The wbc is down today to 17.  To d/w ID the antibiotic regimen and question if they will be needed long term.  His creat was Up to1.86 today, d/w HF lasix 20 x 1 given, TTE pending.

## 2018-03-16 NOTE — PROGRESS NOTE ADULT - ASSESSMENT
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 Underwent biopsy #1 which showed CMR 1R, AMR 1 (no cellular injury). Repeat DSA negative. S/P RHC and EMB 3/14. EMB #3: 0R. Persistent AMR1. No DSA.  RA 12, PA 40/16/27, PCW 11, PA 67%, CO/CI 6.8/3.6.    #NORMAN  - Cr continuing to uptrend again today, now 1.86 from 1.41 and 1.25 prior. /24 hours  - RA 12 on RHC 3/14, now with worsening LOBATO  - Please give lasix 20mg IV x 1 now  - Please repeat TTE now  - Strict I/Os, daily weights    # Immunosuppression prophylaxis:  Tacrolimus - 7 mg Q12. Check levels daily with goal 12-14.   CellCept - continue 1000 mg PO BID  Steroids - continue prednisone 10 mg Q12  Mag oxide 400mg PO TID  	  # Antimicrobial prophylaxis:  Intermediate risk CMV - on oral valganciclovir 450 mg q12  Intermediate risk Toxo - on Mepron 1500mg PO daily  PCP - on Mepron 1500mg PO daily    # Graft function:  biopsy #1: CMR 1R, AMR 1 (no cellular injury). Repeat DSA negative  biopsy #3: 0R. Persistent AMR1. No DSA.  Last TTE with RV systolic dysfunction but improved since previous  Please repeat TTE today in setting of rising Cr.    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  - Remains on Mepron due to hyperkalemia.  - low K diet    #Hypertension  - Well controlled on diltiazem 180 PO daily  - BP ranging 105//74    # ID   - afebrile, WBC 17.1 today from 23.5  - on cefepime for pseudomonas in sputum through 3/15  - s/p bronch 3/14, BAL with NGTD  - on vanc for drainage from explanted vad DLES  - silver sulfadizaine dressings per plastics for R 3rd digit.   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 Underwent biopsy #1 which showed CMR 1R, AMR 1 (no cellular injury). Repeat DSA negative. S/P RHC and EMB 3/14. EMB #3: 0R. Persistent AMR1. No DSA.  RA 12, PA 40/16/27, PCW 11, PA 67%, CO/CI 6.8/3.6.    #NORMAN  - Cr continuing to uptrend again today, now 1.86 from 1.41 and 1.25 prior. /24 hours  - RA 12 on RHC 3/14, now with worsening LOBATO  - Please give lasix 20mg IV x 1 now  - Please repeat TTE now  - Strict I/Os, daily weights    # Immunosuppression prophylaxis:  Tacrolimus - 7 mg Q12. Tacro level today 13.4 from 15.6 yesterday. Check levels daily with goal 12-14.   CellCept - continue 1000 mg PO BID  Steroids - continue prednisone 10 mg Q12  Mag oxide 400mg PO TID  	  # Antimicrobial prophylaxis:  Intermediate risk CMV - on oral valganciclovir 450 mg q12  Intermediate risk Toxo - on Mepron 1500mg PO daily  PCP - on Mepron 1500mg PO daily    # Graft function:  biopsy #1: CMR 1R, AMR 1 (no cellular injury). Repeat DSA negative  biopsy #3: 0R. Persistent AMR1. No DSA.  Last TTE with RV systolic dysfunction but improved since previous  Please repeat TTE today in setting of rising Cr.    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  - Remains on Mepron due to hyperkalemia.  - low K diet    #Hypertension  - Well controlled on diltiazem 180 PO daily  - BP ranging 105//74    # ID   - afebrile, WBC 17.1 today from 23.5  - on cefepime for pseudomonas in sputum through 3/15  - s/p bronch 3/14, BAL with NGTD  - on vanc for drainage from explanted vad DLES  - silver sulfadizaine dressings per plastics for R 3rd digit.   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 Underwent biopsy #1 which showed CMR 1R, AMR 1 (no cellular injury). Repeat DSA negative. S/P RHC and EMB 3/14. EMB #3: 0R. Persistent AMR1. No DSA.  RA 12, PA 40/16/27, PCW 11, PA 67%, CO/CI 6.8/3.6.    #NORMAN  - Cr continuing to uptrend again today, now 1.86 from 1.41 and 1.25 prior. /24 hours  - RA 12 on RHC 3/14, now with worsening LOBATO  - Please give lasix 20mg IV x 1 now  - Please repeat TTE now  - Strict I/Os, daily weights    # Immunosuppression prophylaxis:  Tacrolimus - 7 mg Q12. Tacro level today 13.4 from 15.6 yesterday. Check levels daily with goal 12-14.   CellCept - continue 1000 mg PO BID  Steroids - continue prednisone 10 mg Q12  Mag oxide 400mg PO TID  	  # Antimicrobial prophylaxis:  Intermediate risk CMV - Please change oral valganciclovir 450 mg Q48 hours  Intermediate risk Toxo - on Mepron 1500mg PO daily  PCP - on Mepron 1500mg PO daily    # Graft function:  biopsy #1: CMR 1R, AMR 1 (no cellular injury). Repeat DSA negative  biopsy #3: 0R. Persistent AMR1. No DSA.  Last TTE with RV systolic dysfunction but improved since previous  Please repeat TTE today in setting of rising Cr.    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  - Remains on Mepron due to hyperkalemia.  - low K diet    #Hypertension  - Well controlled on diltiazem 180 PO daily  - BP ranging 105//74    # ID   - afebrile, WBC 17.1 today from 23.5  - on cefepime for pseudomonas in sputum through 3/15  - s/p bronch 3/14, BAL with NGTD  - on vanc for drainage from explanted vad DLES  - silver sulfadizaine dressings per plastics for R 3rd digit.   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 Underwent biopsy #1 which showed CMR 1R, AMR 1 (no cellular injury). Repeat DSA negative. S/P RHC and EMB 3/14. EMB #3: 0R. Persistent AMR1. No DSA.  RA 12, PA 40/16/27, PCW 11, PA 67%, CO/CI 6.8/3.6.    #NORMAN  - Cr continuing to uptrend again today, now 1.86 from 1.41 and 1.25 prior. /24 hours  - RA 12 on RHC 3/14, now with worsening LOBATO  - Please give lasix 20mg IV x 1 now  - Please repeat TTE now  - Strict I/Os, daily weights    # Immunosuppression prophylaxis:  Tacrolimus - 7 mg Q12. Tacro level today 13.4 from 15.6 yesterday. Check levels daily with goal 12-14.   CellCept - continue 1000 mg PO BID  Steroids - continue prednisone 10 mg Q12  Mag oxide 400mg PO TID  	  # Antimicrobial prophylaxis:  Intermediate risk CMV - Please change oral valganciclovir 450 mg Q48 hours  Intermediate risk Toxo - on Mepron 1500mg PO daily  PCP - on Mepron 1500mg PO daily    # Graft function:  biopsy #1: CMR 1R, AMR 1 (no cellular injury). Repeat DSA negative  biopsy #3: 0R. Persistent AMR1. No DSA.  Last TTE with RV systolic dysfunction but improved since previous  Please repeat TTE today in setting of rising Cr.    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  - Remains on Mepron due to hyperkalemia.  - low K diet    #Hypertension  - Well controlled on diltiazem 180 PO daily  - BP ranging 105//74    # ID   - afebrile, WBC 17.1 today from 23.5  - on cefepime for pseudomonas in sputum through 3/15  - s/p bronch 3/14, BAL with NGTD  - on vanc for drainage from explanted vad DLES, dose adjustment per ID  - silver sulfadizaine dressings per plastics for R 3rd digit.   - HCV viral load and PCR sent per ID  - ID following    #endo  - BG generally well controlled ranging   - SSI ACHS    #dispo  - Ongoing work with PT for ongoing exercise tolerance prior to d/c

## 2018-03-16 NOTE — PROGRESS NOTE ADULT - ATTENDING COMMENTS
No overnight events or issues. Leukocytosis is improving.  TTE with mild RVE and RV dysfunction.    EMB #3: 0R. Persistent AMR1. No DSA.  RA 12, PA 40/16/27, PCW 11, PA 67%, CO/CI 6.8/3.6.  Worsening SCr that may be mild fluid overload versus medication (Vanco). Give lasix 20 mg IV once and if no response, escalate to lasix 40 mg IV.  Adjust Vanco and Valcyte for renal function.    On cefepime for pseudomonas in sputum and vancomycin for DLES drainage. Afebrile.  On Lovenox 80 mg SC BID for BUE DVT.       Tacro level 13.4 on Cardizem  mg po daily with improved BP control. Continue 7 mg po BID.  On CellCept 1 gm PO BID.  Prednisone at 10 mg po BID.  Discharge planning for Tuesday as I think he needs a bit more time to work with PT.  EMB #4 will be Thursday morning.

## 2018-03-16 NOTE — PROGRESS NOTE ADULT - PROBLEM SELECTOR PLAN 2
3rd digit right hand w/ ?embolic event-discolorization  plastics debrided for pus  topical silvadene per plastics  will continue to monitor

## 2018-03-16 NOTE — CHART NOTE - NSCHARTNOTEFT_GEN_A_CORE
Source: Patient [ x ]    Family [ ]     other [ x ] RN, medical record    Pt seen for nutrition follow up. Pt unable to teach back foods high in potassium, but was receptive to review. Potassium labs are improved today. Pt was able to teach back most food/nutrient interactions and food safety guidelines. Reviewed recommendations to avoid grapefruit and pomegranate while taking immunosuppressant medication, and recommendations for safe food preparation and eating out. Pt was receptive and expressed understanding.     Pt noted with variable appetite, eating most of his breakfast but only a few bites of lunch (meal tray observed mostly untouched). Nursing flowsheets indicate pt is consuming % of meals. Pt is drinking 1 or 2 Ensure Enlive daily; encouraged to drink an evening supplement. Denies GI distress; 3BM noted 3/15.    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 Noted per ID pt with right lower lobe atelectasis vs early PNA; s/p bronchoscopy- no mucus plug and atelectasis improving. Pt s/p biopsy #1, showed CMR 1R, AMR 1 (no cellular injury); s/p biopsy #2 and bronchoscopy yesterday, with RLL atelectasis.    Diet : regular, fiber/residue restricted, no concentrated potassium, Ensure Enlive 3 cans/day    Admission Weight: 78.9kg  Current Weight: 75.5kg (3/15)  Edema: 1+ L arm, 3+ R arm      Pertinent Medications: MEDICATIONS  (STANDING):  ALBUTerol/ipratropium for Nebulization 3 milliLiter(s) Nebulizer every 6 hours  aspirin enteric coated 81 milliGRAM(s) Oral daily  atovaquone Suspension 1500 milliGRAM(s) Oral daily  Calcium Citrate + Vit D, 315 mg/200 Unit 2 Tablet(s) 2 Tablet(s) Oral two times a day  cefepime  IVPB      cefepime  IVPB 2000 milliGRAM(s) IV Intermittent every 8 hours  diltiazem    milliGRAM(s) Oral daily  docusate sodium 100 milliGRAM(s) Oral three times a day  enoxaparin Injectable 80 milliGRAM(s) SubCutaneous <User Schedule>  insulin lispro (HumaLOG) corrective regimen sliding scale   SubCutaneous three times a day before meals  insulin lispro (HumaLOG) corrective regimen sliding scale   SubCutaneous at bedtime  magnesium oxide 400 milliGRAM(s) Oral three times a day with meals  multivitamin 1 Tablet(s) Oral daily  mycophenolate mofetil 1000 milliGRAM(s) Oral <User Schedule>  nystatin    Suspension 296962 Unit(s) Oral every 6 hours  pantoprazole    Tablet 40 milliGRAM(s) Oral before breakfast  pravastatin 20 milliGRAM(s) Oral at bedtime  predniSONE   Tablet 10 milliGRAM(s) Oral every 12 hours  silver sulfADIAZINE 1% Cream 1 Application(s) Topical two times a day  tacrolimus 7 milliGRAM(s) Oral <User Schedule>  valGANciclovir 450 milliGRAM(s) Oral <User Schedule>  vancomycin  IVPB 1000 milliGRAM(s) IV Intermittent every 12 hours    MEDICATIONS  (PRN):  acetaminophen   Tablet. 650 milliGRAM(s) Oral every 6 hours PRN Mild Pain (1 - 3)    Pertinent Labs (3/16):      Hemoglobin: 9.0 g/dL - low    Hematocrit: 27.5 %- low  Comprehensive Metabolic Panel (03.15.18 @ 07:18)    Sodium, Serum: 136 mmol/L    Potassium, Serum: 4.9 mmol/L    Chloride, Serum: 107 mmol/L    Carbon Dioxide, Serum: 17 mmol/L - low    Anion Gap, Serum: 12 mmol/L    Blood Urea Nitrogen, Serum: 37 mg/dL - high    Creatinine, Serum: 1.86 mg/dL - high    Glucose, Serum: 118 mg/dL - high    Calcium, Total Serum: 8.6 mg/dL    Protein Total, Serum: 6.2 g/dL    Albumin, Serum: 2.9 g/dL - low    Bilirubin Total, Serum: 0.6 mg/dL    Alkaline Phosphatase, Serum: 86 U/L    Aspartate Aminotransferase (AST/SGOT): 16 U/L    Alanine Aminotransferase (ALT/SGPT): 26U/L     CAPILLARY BLOOD GLUCOSE  POCT Blood Glucose.: 142 mg/dL (16 Mar 2018 11:31)  POCT Blood Glucose.: 117 mg/dL (16 Mar 2018 07:24)  POCT Blood Glucose.: 99 mg/dL (15 Mar 2018 21:43)  POCT Blood Glucose.: 101 mg/dL (15 Mar 2018 18:09)    Skin: No pressure ulcers noted.     Estimated Needs:   [ x ] no change since previous assessment  [ ] recalculated:       Previous Nutrition Diagnosis:   Limited adherence to nutrition related recommendations being addressed with continued diet education.   Inadequate oral intake and Increased Nutrient Needs being addressed with PO diet and supplements.      New Nutrition Diagnosis: [ x ] not applicable    Interventions:     Recommend  1. Encourage pt to maintain good PO intake.   2. Provide food preferences within therapeutic diet when requested.   3. Reviewed/reinforced food safety, drug/nutrient interactions and potassium content of foods       Monitoring and Evaluation:   Follow up per protocol  RD to remain available for further nutritional interventions as indicated.   Lilia Zavaleta, MS RD CDN [facility], #[phone].    [ ] Other:        Monitoring and Evaluation:     [ ] PO intake [ ] Tolerance to diet prescription [ ] weights [ ] follow up per protocol    [ ] other:

## 2018-03-16 NOTE — PROGRESS NOTE ADULT - PROBLEM SELECTOR PLAN 4
multiple clots  Lt Sc DVT, cephalic DVT, rt IJ innominate clot  ?awaiting PICC line eval for abx and access  will attempt to place peripheral IV today

## 2018-03-16 NOTE — PROGRESS NOTE ADULT - SUBJECTIVE AND OBJECTIVE BOX
PAGER:  [phone]               [facility] [address]              EMAIL [email or web address]   OFFICE [phone]                              ********VASCULAR MEDICINE PROGRESS NOTE********                        CC:  UE DVT    INTERVAL HISTORY:    No issues.  Arms feel fine. Remains on Lovenox.  The Right arm was unwrapped and re-wrapped together with PT.     MEDICATIONS  (STANDING):  ALBUTerol/ipratropium for Nebulization 3 milliLiter(s) Nebulizer every 6 hours  aspirin enteric coated 81 milliGRAM(s) Oral daily  atovaquone Suspension 1500 milliGRAM(s) Oral daily  Calcium Citrate + Vit D, 315 mg/200 Unit 2 Tablet(s) 2 Tablet(s) Oral two times a day  cefepime  IVPB      cefepime  IVPB 2000 milliGRAM(s) IV Intermittent every 8 hours  diltiazem    milliGRAM(s) Oral daily  docusate sodium 100 milliGRAM(s) Oral three times a day  enoxaparin Injectable 80 milliGRAM(s) SubCutaneous <User Schedule>  insulin lispro (HumaLOG) corrective regimen sliding scale   SubCutaneous three times a day before meals  insulin lispro (HumaLOG) corrective regimen sliding scale   SubCutaneous at bedtime  magnesium oxide 400 milliGRAM(s) Oral three times a day with meals  multivitamin 1 Tablet(s) Oral daily  mycophenolate mofetil 1000 milliGRAM(s) Oral <User Schedule>  nystatin    Suspension 945672 Unit(s) Oral every 6 hours  pantoprazole    Tablet 40 milliGRAM(s) Oral before breakfast  pravastatin 20 milliGRAM(s) Oral at bedtime  predniSONE   Tablet 10 milliGRAM(s) Oral every 12 hours  silver sulfADIAZINE 1% Cream 1 Application(s) Topical two times a day  tacrolimus 7 milliGRAM(s) Oral <User Schedule>  valGANciclovir 450 milliGRAM(s) Oral <User Schedule>  vancomycin  IVPB 1000 milliGRAM(s) IV Intermittent every 12 hours    MEDICATIONS  (PRN):  acetaminophen   Tablet. 650 milliGRAM(s) Oral every 6 hours PRN Mild Pain (1 - 3)      PAST MEDICAL & SURGICAL HISTORY:  GIB (gastrointestinal bleeding)  Ventricular fibrillation: s/p AICD  PAF (paroxysmal atrial fibrillation): on xarelto  Non-Ischemic Cardiomyopathy  SVT (Supraventricular Tachycardia)  HTN  CHF (Congestive Heart Failure)  LVAD (left ventricular assist device) present  Status post left hip replacement  History of Prior Ablation Treatment: for afib  AICD (Automatic Cardioverter/Defibrillator) Present: [employer] with 1 [employer] lead4/1/09- explanted and replaced with [employer] 2 leads on 9/2/09      FAMILY HISTORY:  No pertinent family history in first degree relatives      SOCIAL HISTORY:  unchanged    REVIEW OF SYSTEMS:  CONSTITUTIONAL: No fever, weight loss, or fatigue  EYES: No eye pain, visual disturbances, or discharge  ENMT:  No difficulty hearing, tinnitus, vertigo; No sinus or throat pain  NECK: No pain or stiffness  RESPIRATORY: No cough, wheezing, chills or hemoptysis; No Shortness of Breath  CARDIOVASCULAR: No chest pain, palpitations, passing out, dizziness, or leg swelling  GASTROINTESTINAL: No abdominal or epigastric pain. No nausea, vomiting, or hematemesis; No diarrhea or constipation. No melena or hematochezia.  GENITOURINARY: No dysuria, frequency, hematuria, or incontinence  NEUROLOGICAL: No headaches, memory loss, loss of strength, numbness, or tremors  SKIN: No itching, burning, rashes, or lesions   LYMPH Nodes: No enlarged glands  ENDOCRINE: No heat or cold intolerance; No hair loss  MUSCULOSKELETAL: No joint pain or swelling; No muscle, back, or extremity pain  PSYCHIATRIC: No depression, anxiety, mood swings, or difficulty sleeping  HEME/LYMPH: No easy bruising, or bleeding gums  ALLERY AND IMMUNOLOGIC: No hives or eczema	    [x ] All others negative	  [ ] Unable to obtain  ICU Vital Signs Last 24 Hrs  T(C): 36.7 (16 Mar 2018 11:00), Max: 36.9 (15 Mar 2018 23:43)  T(F): 98 (16 Mar 2018 11:00), Max: 98.4 (15 Mar 2018 23:43)  HR: 106 (16 Mar 2018 11:00) (96 - 108)  BP: 138/75 (16 Mar 2018 11:00) (104/64 - 138/75)  BP(mean): 98 (16 Mar 2018 11:00) (74 - 98)  ABP: --  ABP(mean): --  RR: 18 (16 Mar 2018 11:00) (18 - 18)  SpO2: 96% (16 Mar 2018 11:00) (94% - 99%)      Appearance: Normal  HEENT:   Normal oral mucosa, PERRL, EOMI	  Lymphatic: No lymphadenopathy  Cardiovascular: Normal S1 S2 tachycardia.   Respiratory: clear	  Psychiatry: A & O x 3, Mood & affect appropriate  Gastrointestinal:  Soft, Non-tender, + BS	  Skin: No rashes, No ecchymoses, No cyanosis	  Neurologic: Non-focal  Extremities: RUE edema.  3rd digit R hand ischemia, motion and sensation intact. R arm re-wrapped with ACE wrap at bedside.                                                   9.0    17.1  )-----------( 414      ( 16 Mar 2018 07:18 )             27.5   03-16    136  |  107  |  37<H>  ----------------------------<  118<H>  4.9   |  17<L>  |  1.86<H>    Ca    8.6      16 Mar 2018 07:18    TPro  6.2  /  Alb  2.9<L>  /  TBili  0.6  /  DBili  x   /  AST  16  /  ALT  26  /  AlkPhos  86  03-16

## 2018-03-16 NOTE — PROGRESS NOTE ADULT - SUBJECTIVE AND OBJECTIVE BOX
INFECTIOUS DISEASES FOLLOW UP-- Sujey Lujan  [phone]    This is a follow up note for this  67yMale with s/p OHTx on 2/23, on the step down unit, fatigues easily with walking        ROS:  CONSTITUTIONAL:  No fever, good appetite  CARDIOVASCULAR:  No chest pain or palpitations  RESPIRATORY:  No dyspnea  GASTROINTESTINAL:  No nausea, vomiting, diarrhea, or abdominal pain  GENITOURINARY:  No dysuria  NEUROLOGIC:  No headache,     Allergies    No Known Allergies    Intolerances        ANTIBIOTICS/RELEVANT:  antimicrobials  atovaquone Suspension 1500 milliGRAM(s) Oral daily  cefepime  IVPB      cefepime  IVPB 2000 milliGRAM(s) IV Intermittent every 8 hours  nystatin    Suspension 212593 Unit(s) Oral every 6 hours  valGANciclovir 450 milliGRAM(s) Oral <User Schedule>  vancomycin  IVPB 1000 milliGRAM(s) IV Intermittent every 12 hours    immunologic:  mycophenolate mofetil 1000 milliGRAM(s) Oral <User Schedule>  tacrolimus 7 milliGRAM(s) Oral <User Schedule>    OTHER:  acetaminophen   Tablet. 650 milliGRAM(s) Oral every 6 hours PRN  ALBUTerol/ipratropium for Nebulization 3 milliLiter(s) Nebulizer every 6 hours  aspirin enteric coated 81 milliGRAM(s) Oral daily  Calcium Citrate + Vit D, 315 mg/200 Unit 2 Tablet(s) 2 Tablet(s) Oral two times a day  diltiazem    milliGRAM(s) Oral daily  docusate sodium 100 milliGRAM(s) Oral three times a day  enoxaparin Injectable 80 milliGRAM(s) SubCutaneous <User Schedule>  insulin lispro (HumaLOG) corrective regimen sliding scale   SubCutaneous three times a day before meals  insulin lispro (HumaLOG) corrective regimen sliding scale   SubCutaneous at bedtime  magnesium oxide 400 milliGRAM(s) Oral three times a day with meals  multivitamin 1 Tablet(s) Oral daily  pantoprazole    Tablet 40 milliGRAM(s) Oral before breakfast  pravastatin 20 milliGRAM(s) Oral at bedtime  predniSONE   Tablet 10 milliGRAM(s) Oral every 12 hours  silver sulfADIAZINE 1% Cream 1 Application(s) Topical two times a day      Objective:  Vital Signs Last 24 Hrs  T(C): 36.2 (16 Mar 2018 15:36), Max: 36.9 (15 Mar 2018 23:43)  T(F): 97.2 (16 Mar 2018 15:36), Max: 98.4 (15 Mar 2018 23:43)  HR: 100 (16 Mar 2018 15:36) (96 - 108)  BP: 100/66 (16 Mar 2018 15:36) (100/66 - 138/75)  BP(mean): 98 (16 Mar 2018 11:00) (74 - 98)  RR: 18 (16 Mar 2018 15:36) (18 - 18)  SpO2: 98% (16 Mar 2018 15:36) (94% - 99%)    PHYSICAL EXAM:  Constitutional:no acute distress  Eyes:WALT, EOMI  Ear/Nose/Throat: no oral lesions, 	  Respiratory: clear BL, decreased at right base  Cardiovascular: S1S2  sternotomy wound dressing dry and intact  Gastrointestinal:soft, (+) BS, no tenderness  Extremities:no e/e/c  No Lymphadenopathy  IV sites not inflammed.    LABS:                        9.0    17.1  )-----------( 414      ( 16 Mar 2018 07:18 )             27.5     03-16    136  |  107  |  37<H>  ----------------------------<  118<H>  4.9   |  17<L>  |  1.86<H>    Ca    8.6      16 Mar 2018 07:18    TPro  6.2  /  Alb  2.9<L>  /  TBili  0.6  /  DBili  x   /  AST  16  /  ALT  26  /  AlkPhos  86  03-16          MICROBIOLOGY:            RECENT CULTURES:  03-14 @ 21:44  .Sputum Sputum  --  --  --    No growth to date.  --  03-11 @ 22:57  .Sputum Sputum  Pseudomonas aeruginosa (Carbapenem Resistant)  Pseudomonas aeruginosa (Carbapenem Resistant)  KAMILA    Few Pseudomonas aeruginosa (Carbapenem Resistant)  Normal Respiratory Heaven present  --  03-10 @ 14:32  .Urine Clean Catch (Midstream)  --  --  --    No growth  --  03-09 @ 22:00  .Blood Blood  --  --  --    No growth at 5 days.  --      RADIOLOGY & ADDITIONAL STUDIES:    < from: Xray Chest 1 View- PORTABLE-Routine (03.16.18 @ 05:48) >    IMPRESSION:  Moderate sized right pleural effusion and right basilar atelectasis,   unchanged.    < end of copied text >

## 2018-03-16 NOTE — PROGRESS NOTE ADULT - PROBLEM SELECTOR PLAN 1
S/p  heart transplant  2/23  on rejection meds with HF following,  -Tacrolimus level 15.6 this am - will discuss dosage change with DR. 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

## 2018-03-16 NOTE — PROGRESS NOTE ADULT - SUBJECTIVE AND OBJECTIVE BOX
im tired    VITAL SIGNS    Telemetry:   nsr 90    Vital Signs Last 24 Hrs  T(C): 36.4 (03-16-18 @ 08:08), Max: 36.9 (03-15-18 @ 23:43)  T(F): 97.5 (03-16-18 @ 08:08), Max: 98.4 (03-15-18 @ 23:43)  HR: 108 (03-16-18 @ 08:08) (96 - 108)  BP: 113/74 (03-16-18 @ 08:08) (104/64 - 119/74)  RR: 18 (03-16-18 @ 08:08) (18 - 18)  SpO2: 99% (03-16-18 @ 08:08) (94% - 99%)                   03-15 @ 07:01  -  03-16 @ 07:00  --------------------------------------------------------  IN: 960 mL / OUT: 950 mL / NET: 10 mL          Daily     Daily         CAPILLARY BLOOD GLUCOSE      POCT Blood Glucose.: 117 mg/dL (16 Mar 2018 07:24)  POCT Blood Glucose.: 99 mg/dL (15 Mar 2018 21:43)  POCT Blood Glucose.: 101 mg/dL (15 Mar 2018 18:09)  POCT Blood Glucose.: 132 mg/dL (15 Mar 2018 12:46)                  Coumadin    [ ] YES          [x  ]      NO         Reason:     lovenox 80 bid                          PHYSICAL EXAM        Neurology: alert and oriented x 3, nonfocal, no gross deficits  CV : S1 S2 , RRR   Sternal Wound :  CDI , Stable  Lungs:  decreased bases R>l  Abdomen: soft, nontender, nondistended, positive bowel sounds, last bowel movement 3/15, vac to suction old driveline site  :           voiding  Extremities:     - edema - calve tenderness          acetaminophen   Tablet. 650 milliGRAM(s) Oral every 6 hours PRN  ALBUTerol/ipratropium for Nebulization 3 milliLiter(s) Nebulizer every 6 hours  aspirin enteric coated 81 milliGRAM(s) Oral daily  atovaquone Suspension 1500 milliGRAM(s) Oral daily  Calcium Citrate + Vit D, 315 mg/200 Unit 2 Tablet(s) 2 Tablet(s) Oral two times a day  cefepime  IVPB      cefepime  IVPB 2000 milliGRAM(s) IV Intermittent every 8 hours  diltiazem    milliGRAM(s) Oral daily  docusate sodium 100 milliGRAM(s) Oral three times a day  enoxaparin Injectable 80 milliGRAM(s) SubCutaneous <User Schedule>  furosemide   Injectable 20 milliGRAM(s) IV Push once  insulin lispro (HumaLOG) corrective regimen sliding scale   SubCutaneous three times a day before meals  insulin lispro (HumaLOG) corrective regimen sliding scale   SubCutaneous at bedtime  magnesium oxide 400 milliGRAM(s) Oral three times a day with meals  multivitamin 1 Tablet(s) Oral daily  mycophenolate mofetil 1000 milliGRAM(s) Oral <User Schedule>  nystatin    Suspension 390691 Unit(s) Oral every 6 hours  pantoprazole    Tablet 40 milliGRAM(s) Oral before breakfast  pravastatin 20 milliGRAM(s) Oral at bedtime  predniSONE   Tablet 10 milliGRAM(s) Oral every 12 hours  silver sulfADIAZINE 1% Cream 1 Application(s) Topical two times a day  tacrolimus 7 milliGRAM(s) Oral <User Schedule>  valGANciclovir 450 milliGRAM(s) Oral every 12 hours  vancomycin  IVPB 1000 milliGRAM(s) IV Intermittent every 12 hours                    Physical Therapy Rec:   Home  [  ]   Home w/ PT  [  ]  Rehab  [  ]  Discussed with Cardiothoracic Team at AM rounds.

## 2018-03-16 NOTE — PROGRESS NOTE ADULT - ASSESSMENT
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 Patient on broad federica-operative prophylaxis to Vancomycin/Meropenem/Fluconazole based upon his prior LVAD placement and prolonged hospitalization pre-transplant. Intermediate risk for CMV (seropositive); intermediate risk for toxoplasmosis (seropositive R). Improved aeration today at right lung base. He had Significant amount of sero-sanguinous drainage from old LVAD exit site but cultures are NGTD. WBC trending down    - Plan:  Continue OI prophylaxis with Valganciclovir, Nystatin, Bactrim changed to Mepron by transplant team for PCP prophylaxis because of elevated potassium/creatinine.    Will discontinue Cefepime as he has completed therapy for VAP  Creat. rising on Vancomycin- for old deep LVAD site, appears to be granulating- will change to Daptomycin 500mg/24hr.    Patient received a heart from an HCV+ donor with genotype 1a- his/recipient most recent HCV VL is in the 4 log range and will need treatment for HCV post discharge with [facility]. His LFTs currently are wnl    Gary Lujan MD  [phone]  After 5pm/weekends [phone]

## 2018-03-17 LAB
-  AMIKACIN: SIGNIFICANT CHANGE UP
-  AZTREONAM: SIGNIFICANT CHANGE UP
-  CEFEPIME: SIGNIFICANT CHANGE UP
-  CEFTAZIDIME: SIGNIFICANT CHANGE UP
-  CIPROFLOXACIN: SIGNIFICANT CHANGE UP
-  GENTAMICIN: SIGNIFICANT CHANGE UP
-  IMIPENEM: SIGNIFICANT CHANGE UP
-  LEVOFLOXACIN: SIGNIFICANT CHANGE UP
-  MEROPENEM: SIGNIFICANT CHANGE UP
-  PIPERACILLIN/TAZOBACTAM: SIGNIFICANT CHANGE UP
-  TOBRAMYCIN: SIGNIFICANT CHANGE UP
ALBUMIN SERPL ELPH-MCNC: 3 G/DL — LOW (ref 3.3–5)
ALP SERPL-CCNC: 86 U/L — SIGNIFICANT CHANGE UP (ref 40–120)
ALT FLD-CCNC: 23 U/L RC — SIGNIFICANT CHANGE UP (ref 10–45)
ANION GAP SERPL CALC-SCNC: 10 MMOL/L — SIGNIFICANT CHANGE UP (ref 5–17)
AST SERPL-CCNC: 15 U/L — SIGNIFICANT CHANGE UP (ref 10–40)
BILIRUB SERPL-MCNC: 0.6 MG/DL — SIGNIFICANT CHANGE UP (ref 0.2–1.2)
BUN SERPL-MCNC: 43 MG/DL — HIGH (ref 7–23)
CALCIUM SERPL-MCNC: 8.9 MG/DL — SIGNIFICANT CHANGE UP (ref 8.4–10.5)
CHLORIDE SERPL-SCNC: 106 MMOL/L — SIGNIFICANT CHANGE UP (ref 96–108)
CO2 SERPL-SCNC: 22 MMOL/L — SIGNIFICANT CHANGE UP (ref 22–31)
CREAT SERPL-MCNC: 2.44 MG/DL — HIGH (ref 0.5–1.3)
CULTURE RESULTS: SIGNIFICANT CHANGE UP
GLUCOSE BLDC GLUCOMTR-MCNC: 118 MG/DL — HIGH (ref 70–99)
GLUCOSE BLDC GLUCOMTR-MCNC: 124 MG/DL — HIGH (ref 70–99)
GLUCOSE BLDC GLUCOMTR-MCNC: 157 MG/DL — HIGH (ref 70–99)
GLUCOSE BLDC GLUCOMTR-MCNC: 98 MG/DL — SIGNIFICANT CHANGE UP (ref 70–99)
GLUCOSE SERPL-MCNC: 97 MG/DL — SIGNIFICANT CHANGE UP (ref 70–99)
HCT VFR BLD CALC: 26.2 % — LOW (ref 39–50)
HGB BLD-MCNC: 8.5 G/DL — LOW (ref 13–17)
MCHC RBC-ENTMCNC: 29.8 PG — SIGNIFICANT CHANGE UP (ref 27–34)
MCHC RBC-ENTMCNC: 32.3 GM/DL — SIGNIFICANT CHANGE UP (ref 32–36)
MCV RBC AUTO: 92.4 FL — SIGNIFICANT CHANGE UP (ref 80–100)
METHOD TYPE: SIGNIFICANT CHANGE UP
ORGANISM # SPEC MICROSCOPIC CNT: SIGNIFICANT CHANGE UP
ORGANISM # SPEC MICROSCOPIC CNT: SIGNIFICANT CHANGE UP
PLATELET # BLD AUTO: 442 K/UL — HIGH (ref 150–400)
POTASSIUM SERPL-MCNC: 4.7 MMOL/L — SIGNIFICANT CHANGE UP (ref 3.5–5.3)
POTASSIUM SERPL-SCNC: 4.7 MMOL/L — SIGNIFICANT CHANGE UP (ref 3.5–5.3)
PROT SERPL-MCNC: 6.6 G/DL — SIGNIFICANT CHANGE UP (ref 6–8.3)
RBC # BLD: 2.83 M/UL — LOW (ref 4.2–5.8)
RBC # FLD: 16.7 % — HIGH (ref 10.3–14.5)
SODIUM SERPL-SCNC: 138 MMOL/L — SIGNIFICANT CHANGE UP (ref 135–145)
SPECIMEN SOURCE: SIGNIFICANT CHANGE UP
TACROLIMUS SERPL-MCNC: 12.1 NG/ML — SIGNIFICANT CHANGE UP
WBC # BLD: 15.7 K/UL — HIGH (ref 3.8–10.5)
WBC # FLD AUTO: 15.7 K/UL — HIGH (ref 3.8–10.5)

## 2018-03-17 PROCEDURE — 73630 X-RAY EXAM OF FOOT: CPT | Mod: 26,RT

## 2018-03-17 PROCEDURE — 99232 SBSQ HOSP IP/OBS MODERATE 35: CPT

## 2018-03-17 PROCEDURE — 99233 SBSQ HOSP IP/OBS HIGH 50: CPT

## 2018-03-17 PROCEDURE — 71045 X-RAY EXAM CHEST 1 VIEW: CPT | Mod: 26

## 2018-03-17 RX ORDER — HEPARIN SODIUM 5000 [USP'U]/ML
18500 INJECTION INTRAVENOUS; SUBCUTANEOUS
Qty: 0 | Refills: 0 | Status: DISCONTINUED | OUTPATIENT
Start: 2018-03-18 | End: 2018-03-19

## 2018-03-17 RX ORDER — HEPARIN SODIUM 5000 [USP'U]/ML
1850 INJECTION INTRAVENOUS; SUBCUTANEOUS
Qty: 0 | Refills: 0 | Status: DISCONTINUED | OUTPATIENT
Start: 2018-03-17 | End: 2018-03-17

## 2018-03-17 RX ADMIN — Medication 81 MILLIGRAM(S): at 11:30

## 2018-03-17 RX ADMIN — Medication 10 MILLIGRAM(S): at 17:43

## 2018-03-17 RX ADMIN — ATOVAQUONE 1500 MILLIGRAM(S): 750 SUSPENSION ORAL at 11:30

## 2018-03-17 RX ADMIN — VALGANCICLOVIR 450 MILLIGRAM(S): 450 TABLET, FILM COATED ORAL at 05:44

## 2018-03-17 RX ADMIN — Medication 3 MILLILITER(S): at 00:14

## 2018-03-17 RX ADMIN — Medication 180 MILLIGRAM(S): at 05:44

## 2018-03-17 RX ADMIN — MYCOPHENOLATE MOFETIL 1000 MILLIGRAM(S): 250 CAPSULE ORAL at 19:59

## 2018-03-17 RX ADMIN — PANTOPRAZOLE SODIUM 40 MILLIGRAM(S): 20 TABLET, DELAYED RELEASE ORAL at 05:44

## 2018-03-17 RX ADMIN — Medication 1 APPLICATION(S): at 05:45

## 2018-03-17 RX ADMIN — Medication 500000 UNIT(S): at 00:14

## 2018-03-17 RX ADMIN — Medication 10 MILLIGRAM(S): at 05:45

## 2018-03-17 RX ADMIN — Medication 100 MILLIGRAM(S): at 14:48

## 2018-03-17 RX ADMIN — Medication 3 MILLILITER(S): at 17:43

## 2018-03-17 RX ADMIN — ENOXAPARIN SODIUM 80 MILLIGRAM(S): 100 INJECTION SUBCUTANEOUS at 11:30

## 2018-03-17 RX ADMIN — MAGNESIUM OXIDE 400 MG ORAL TABLET 400 MILLIGRAM(S): 241.3 TABLET ORAL at 08:14

## 2018-03-17 RX ADMIN — Medication 20 MILLIGRAM(S): at 21:50

## 2018-03-17 RX ADMIN — Medication 1 APPLICATION(S): at 17:44

## 2018-03-17 RX ADMIN — Medication 500000 UNIT(S): at 17:43

## 2018-03-17 RX ADMIN — TACROLIMUS 7 MILLIGRAM(S): 5 CAPSULE ORAL at 08:15

## 2018-03-17 RX ADMIN — MAGNESIUM OXIDE 400 MG ORAL TABLET 400 MILLIGRAM(S): 241.3 TABLET ORAL at 19:59

## 2018-03-17 RX ADMIN — Medication 500000 UNIT(S): at 11:30

## 2018-03-17 RX ADMIN — MAGNESIUM OXIDE 400 MG ORAL TABLET 400 MILLIGRAM(S): 241.3 TABLET ORAL at 11:30

## 2018-03-17 RX ADMIN — Medication 3 MILLILITER(S): at 11:30

## 2018-03-17 RX ADMIN — MYCOPHENOLATE MOFETIL 1000 MILLIGRAM(S): 250 CAPSULE ORAL at 08:15

## 2018-03-17 RX ADMIN — TACROLIMUS 7 MILLIGRAM(S): 5 CAPSULE ORAL at 19:59

## 2018-03-17 RX ADMIN — Medication 500000 UNIT(S): at 05:44

## 2018-03-17 RX ADMIN — Medication 3 MILLILITER(S): at 05:44

## 2018-03-17 RX ADMIN — Medication 1 TABLET(S): at 11:31

## 2018-03-17 RX ADMIN — Medication 2: at 11:31

## 2018-03-17 NOTE — PROGRESS NOTE ADULT - ASSESSMENT
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 Underwent biopsy #1 which showed CMR 1R, AMR 1 (no cellular injury). Repeat DSA negative. S/P RHC and EMB 3/14. EMB #3: 0R. Persistent AMR1. No DSA.  RA 12, PA 40/16/27, PCW 11, PA 67%, CO/CI 6.8/3.6.    #NORMAN  - Cr continuing to uptrend again today, now 1.86 from 1.41 and 1.25 prior. /24 hours  - RA 12 on RHC 3/14, now with worsening LOBATO  - Please give lasix 20mg IV x 1 now  - Please repeat TTE now  - Strict I/Os, daily weights    # Immunosuppression prophylaxis:  Tacrolimus - 7 mg Q12. Tacro level today 13.4 from 15.6 yesterday. Check levels daily with goal 12-14.   CellCept - continue 1000 mg PO BID  Steroids - continue prednisone 10 mg Q12  Mag oxide 400mg PO TID  	  # Antimicrobial prophylaxis:  Intermediate risk CMV - Please change oral valganciclovir 450 mg Q48 hours  Intermediate risk Toxo - on Mepron 1500mg PO daily  PCP - on Mepron 1500mg PO daily    # Graft function:  biopsy #1: CMR 1R, AMR 1 (no cellular injury). Repeat DSA negative  biopsy #3: 0R. Persistent AMR1. No DSA.  Last TTE with RV systolic dysfunction but improved since previous  Please repeat TTE today in setting of rising Cr.    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  - Remains on Mepron due to hyperkalemia.  - low K diet    #Hypertension  - Well controlled on diltiazem 180 PO daily  - BP ranging 105//74    # ID   - afebrile, WBC 17.1 today from 23.5  - on cefepime for pseudomonas in sputum through 3/15  - s/p bronch 3/14, BAL with NGTD  - on vanc for drainage from explanted vad DLES, dose adjustment per ID  - silver sulfadizaine dressings per plastics for R 3rd digit.   - HCV viral load and PCR sent per ID  - ID following    #endo  - BG generally well controlled ranging   - SSI ACHS    #dispo  - Ongoing work with PT for ongoing exercise tolerance prior to d/c This is a 67 year old man with ACC/AHA stage D chronic systolic heart failure due to NICM (LVEF 20%) s/p HM2 LVAD 6/17 c/b pump thrombus now s/p OHT 2/23 (CMV D-/R+ and Toxo D-/R+), with post-op course c/b primary graft dysfunction, initially thought to be immune-mediated due to positive B-cell flow (negative CDC cross match) and received plasmapharesis/IVIg x2 with improvement in LV function. Was found to have b/l IJ/subclavian thrombi on .     #Biopsies  EMB #1: 1R/1A, AMR 1. DSA negative.  EMB #2: 1R/1A, AMR 1. DSA negative.  EMB #3: 1R/1A. stable AMR1. No DSA. RA 12, PA 40/16/27, PCW 11, PA 67%, CO/CI 6.8/3.6. TTE with mild RV enlargement and dysfunction.  EMB #4: to be scheduled for Thurs, 3/22, 8:30 AM    # Immunosuppression prophylaxis:  Tacrolimus - 7 mg Q12. Target trough level 12-14. In setting of renal impairment, likely will need to dose reduce.  CellCept - continue 1000 mg PO BID  Steroids - continue prednisone 10 mg Q12  	  # Antimicrobial prophylaxis:  Intermediate risk CMV - Adjusted Valcyte for NORMAN to 450 mg Q48 hours  Intermediate risk Toxo - on Mepron 1500mg PO daily  PCP - on Mepron 1500mg PO daily  Thrush - continue Nystatin S/S 5 mL QID    # Additional HCM:  - Citracal + D 2 tabs PO BID  - multivitamin 1 tab daily  - Stress ulcer ppx while on steriods: protonix to 40mg PO daily  - Mag oxide 400mg PO TID (Mg wasting due to tacrolimus)    # Bilateral IJ/Subclavian thrombi - unchanged on f/u U/S 3/12  - Lovenox 80mg subcutaneous Q12 x 3 months  - CHERIE negative    # CAV PPx  - Continue ECASA 81mg PO daily  - Continue pravastatin 20mg PO QHS    #NORMAN likely related to the Vancomycin dosing  - Vanco stopped. DLES looked very good yesterday and WBC trending down  - He responded to diuretics given yesterday. No further diuresis today.  - Please check daily weights and track urine output (NO HOBSON)    #Hypertension  - Well controlled on diltiazem  PO daily    # ID   - Pseudomonas in sputum - course of cefepime completed 3/15  - DLES improved and vancomycin stopped given high trough with NORMAN  - silver sulfadizaine dressings per plastics for R 3rd digit.   - HCV viral load and PCR per protocol. Therapy to be initiated as outpatient by Dr. Thomas (Hepatology)  - Transplant ID following    #endo  - SSI AC & HS    #dispo  - Ongoing work with PT for ongoing exercise tolerance prior to d/c  - Planning for discharge on Tuesday if medically stable This is a 67 year old man with ACC/AHA stage D chronic systolic heart failure due to NICM (LVEF 20%) s/p HM2 LVAD 6/17 c/b pump thrombus now s/p OHT 2/23 (CMV D-/R+ and Toxo D-/R+), with post-op course c/b primary graft dysfunction, initially thought to be immune-mediated due to positive B-cell flow (negative CDC cross match) and received plasmapharesis/IVIg x2 with improvement in LV function. Was found to have b/l IJ/subclavian thrombi on .     #Biopsies  EMB #1: 1R/1A, AMR 1. DSA negative.  EMB #2: 1R/1A, AMR 1. DSA negative.  EMB #3: 1R/1A. stable AMR1. No DSA. RA 12, PA 40/16/27, PCW 11, PA 67%, CO/CI 6.8/3.6. TTE with mild RV enlargement and dysfunction.  EMB #4: to be scheduled for urs, 3/22, 8:30 AM    # Immunosuppression prophylaxis:  Tacrolimus - continue 7 mg PO Q12. Target trough level 12-14.  CellCept - continue 1000 mg PO BID  Steroids - continue prednisone 10 mg Q12  	  # Antimicrobial prophylaxis:  Intermediate risk CMV - Adjusted Valcyte for NORMAN to 450 mg Q48 hours  Intermediate risk Toxo - on Mepron 1500mg PO daily  PCP - on Mepron 1500mg PO daily  Thrush - continue Nystatin S/S 5 mL QID    # Additional HCM:  - Citracal + D 2 tabs PO BID  - multivitamin 1 tab daily  - Stress ulcer ppx while on steriods: protonix to 40mg PO daily  - Mag oxide 400mg PO TID (Mg wasting due to tacrolimus)    # Bilateral IJ/Subclavian thrombi - unchanged on f/u U/S 3/12  - Lovenox 80mg subcutaneous Q12 x 3 months  - CHERIE negative    # CAV PPx  - Continue ECASA 81mg PO daily  - Continue pravastatin 20mg PO QHS    #NORMAN likely related to the Vancomycin dosing  - Vanco stopped. DLES looked very good yesterday and WBC trending down  - He responded to diuretics given yesterday. No further diuresis today.  - Please check daily weights and track urine output (NO HOBSON)    #Hypertension  - Well controlled on cardizem  PO daily    # ID   - Pseudomonas in sputum - course of cefepime completed 3/15  - DLES improved and vancomycin stopped given high trough with NORMAN  - silver sulfadizaine dressings per plastics for R 3rd digit.   - HCV viral load and PCR per protocol. Therapy to be initiated as outpatient by Dr. Thomas (Hepatology)  - Transplant ID following    #endo  - SSI AC & HS    #dispo  - Ongoing work with PT for ongoing exercise tolerance prior to d/c  - Planning for discharge on Tuesday if medically stable

## 2018-03-17 NOTE — PROGRESS NOTE ADULT - SUBJECTIVE AND OBJECTIVE BOX
INFECTIOUS DISEASES FOLLOW UP-- Sujey Lujan  [phone]    This is a follow up note for this  67yMale with s/p OHTx  2/23      ROS:  CONSTITUTIONAL:  No fever, good appetite  CARDIOVASCULAR:  No chest pain or palpitations  RESPIRATORY:  No dyspnea  GASTROINTESTINAL:  No nausea, vomiting, diarrhea, or abdominal pain  GENITOURINARY:  No dysuria  NEUROLOGIC:  No headache,     Allergies    No Known Allergies    Intolerances        ANTIBIOTICS/RELEVANT:  antimicrobials  atovaquone Suspension 1500 milliGRAM(s) Oral daily  nystatin    Suspension 528901 Unit(s) Oral every 6 hours  valGANciclovir 450 milliGRAM(s) Oral <User Schedule>    immunologic:  mycophenolate mofetil 1000 milliGRAM(s) Oral <User Schedule>  tacrolimus 7 milliGRAM(s) Oral <User Schedule>    OTHER:  acetaminophen   Tablet. 650 milliGRAM(s) Oral every 6 hours PRN  ALBUTerol/ipratropium for Nebulization 3 milliLiter(s) Nebulizer every 6 hours  aspirin enteric coated 81 milliGRAM(s) Oral daily  Calcium Citrate + Vit D, 315 mg/200 Unit 2 Tablet(s) 2 Tablet(s) Oral two times a day  diltiazem    milliGRAM(s) Oral daily  docusate sodium 100 milliGRAM(s) Oral three times a day  insulin lispro (HumaLOG) corrective regimen sliding scale   SubCutaneous three times a day before meals  insulin lispro (HumaLOG) corrective regimen sliding scale   SubCutaneous at bedtime  magnesium oxide 400 milliGRAM(s) Oral three times a day with meals  multivitamin 1 Tablet(s) Oral daily  pantoprazole    Tablet 40 milliGRAM(s) Oral before breakfast  pravastatin 20 milliGRAM(s) Oral at bedtime  predniSONE   Tablet 10 milliGRAM(s) Oral every 12 hours  silver sulfADIAZINE 1% Cream 1 Application(s) Topical two times a day      Objective:  Vital Signs Last 24 Hrs  T(C): 36.4 (17 Mar 2018 19:24), Max: 36.9 (17 Mar 2018 03:11)  T(F): 97.5 (17 Mar 2018 19:24), Max: 98.4 (17 Mar 2018 03:11)  HR: 103 (17 Mar 2018 19:24) (96 - 103)  BP: 99/68 (17 Mar 2018 19:24) (98/59 - 112/62)  BP(mean): 74 (17 Mar 2018 03:11) (74 - 86)  RR: 18 (17 Mar 2018 19:24) (18 - 18)  SpO2: 96% (17 Mar 2018 19:24) (96% - 99%)    PHYSICAL EXAM:  Constitutional:no acute distress  Eyes:WALT, EOMI  Ear/Nose/Throat: no oral lesions, 	  Respiratory: clear BL decreased right base  Cardiovascular: S1S2  Gastrointestinal:soft, (+) BS, no tenderness  Extremities:no e/e/c  No Lymphadenopathy  IV sites not inflammed.    LABS:                        8.5    15.7  )-----------( 442      ( 17 Mar 2018 07:15 )             26.2     03-17    138  |  106  |  43<H>  ----------------------------<  97  4.7   |  22  |  2.44<H>    Ca    8.9      17 Mar 2018 07:15    TPro  6.6  /  Alb  3.0<L>  /  TBili  0.6  /  DBili  x   /  AST  15  /  ALT  23  /  AlkPhos  86  03-17          MICROBIOLOGY:      Vanco trough=32.7 3/16      RECENT CULTURES:  03-14 @ 21:44  .Sputum Sputum  Pseudomonas aeruginosa (Carbapenem Resistant)  Pseudomonas aeruginosa (Carbapenem Resistant)  KAMILA    Rare Pseudomonas aeruginosa (Carbapenem Resistant)  Normal Respiratory Heaven present  --  03-11 @ 22:57  .Sputum Sputum  Pseudomonas aeruginosa (Carbapenem Resistant)  Pseudomonas aeruginosa (Carbapenem Resistant)  KAMILA    Few Pseudomonas aeruginosa (Carbapenem Resistant)  Normal Respiratory Heaven present  --      RADIOLOGY & ADDITIONAL STUDIES:    < from: Xray Chest 1 View- PORTABLE-Routine (03.16.18 @ 05:48) >    IMPRESSION:  Moderate sized right pleural effusion and right basilar atelectasis,   unchanged.    < end of copied text >

## 2018-03-17 NOTE — PROGRESS NOTE ADULT - ASSESSMENT
68 yo man s/p prior LVAD placement, no infections related to the LVAD prior to undergoing an orthotopic heart transplant 2/23 from a reportedly high risk donor HCV. Patient on broad federica-operative prophylaxis to Vancomycin/Meropenem/Fluconazole based upon his prior LVAD placement and prolonged hospitalization pre-transplant. Intermediate risk for CMV (seropositive); intermediate risk for toxoplasmosis (seropositive R). Improved aeration today at right lung base. He had Significant amount of sero-sanguinous drainage from old LVAD exit site but cultures are NGTD. WBC trending down    - Plan:  Continue OI prophylaxis with Valganciclovir, Nystatin, Bactrim changed to Mepron by transplant team for PCP prophylaxis because of elevated potassium/creatinine.    NORMAN on Vancomycin with elevated trough level- antibiotics discontinued 3/16/18  observe off antibiotics WBC is trending downward    Patient received a heart from an HCV+ donor with genotype 1a- his/recipient most recent HCV VL is in the 4 log range and will need treatment for HCV post discharge with [facility]. His LFTs currently are wnl    Gary Lujan MD  [phone]  After 5pm/weekends [phone]

## 2018-03-17 NOTE — CONSULT NOTE ADULT - PROVIDER SPECIALTY LIST ADULT
Cardiology
Gastroenterology
Heart Failure
Podiatry
Rheumatology
Transfusion Medicine
Vascular Surgery
Nephrology
Infectious Disease

## 2018-03-17 NOTE — CONSULT NOTE ADULT - SUBJECTIVE AND OBJECTIVE BOX
Podiatry pager #: [phone]    Patient is a 67y old  Male who presents with a chief complaint of Elevated LDH levels (01 Feb 2018 19:36)    HPI:  67M PMH Chronic Systolic Heart Failure (ACC/AHA Stage D) due to NICMP s/p LVAD 6/12/17, GIB s/p Cautery (7/25/2017), known AV Malformations, Chronic Anemia due to numerous GIBs (off AC), A-Fib, VT s/p AICD. Pt admitted for heart transplant. Podiatry consulted for right foot bunion pain. Pt has hx of gout and arthritis in 1st MTPJ. Pt states only feels pain when ambulating. Pt states he noticed it develop over the past couple days. Denies any other pedal complaints.      PAST MEDICAL & SURGICAL HISTORY:  GIB (gastrointestinal bleeding)  Ventricular fibrillation: s/p AICD  PAF (paroxysmal atrial fibrillation): on xarelto  Non-Ischemic Cardiomyopathy  SVT (Supraventricular Tachycardia)  HTN  CHF (Congestive Heart Failure)  LVAD (left ventricular assist device) present  Status post left hip replacement  History of Prior Ablation Treatment: for afib  AICD (Automatic Cardioverter/Defibrillator) Present: [employer] with 1 [employer] lead4/1/09- explanted and replaced with [employer] 2 leads on 9/2/09    MEDICATIONS  (STANDING):  ALBUTerol/ipratropium for Nebulization 3 milliLiter(s) Nebulizer every 6 hours  aspirin enteric coated 81 milliGRAM(s) Oral daily  atovaquone Suspension 1500 milliGRAM(s) Oral daily  Calcium Citrate + Vit D, 315 mg/200 Unit 2 Tablet(s) 2 Tablet(s) Oral two times a day  diltiazem    milliGRAM(s) Oral daily  docusate sodium 100 milliGRAM(s) Oral three times a day  insulin lispro (HumaLOG) corrective regimen sliding scale   SubCutaneous three times a day before meals  insulin lispro (HumaLOG) corrective regimen sliding scale   SubCutaneous at bedtime  magnesium oxide 400 milliGRAM(s) Oral three times a day with meals  multivitamin 1 Tablet(s) Oral daily  mycophenolate mofetil 1000 milliGRAM(s) Oral <User Schedule>  nystatin    Suspension 585339 Unit(s) Oral every 6 hours  pantoprazole    Tablet 40 milliGRAM(s) Oral before breakfast  pravastatin 20 milliGRAM(s) Oral at bedtime  predniSONE   Tablet 10 milliGRAM(s) Oral every 12 hours  silver sulfADIAZINE 1% Cream 1 Application(s) Topical two times a day  tacrolimus 7 milliGRAM(s) Oral <User Schedule>  valGANciclovir 450 milliGRAM(s) Oral <User Schedule>    MEDICATIONS  (PRN):  acetaminophen   Tablet. 650 milliGRAM(s) Oral every 6 hours PRN Mild Pain (1 - 3)    Allergies    No Known Allergies    Intolerances    VITALS:    Vital Signs Last 24 Hrs  T(C): 36.8 (17 Mar 2018 15:33), Max: 36.9 (17 Mar 2018 03:11)  T(F): 98.3 (17 Mar 2018 15:33), Max: 98.4 (17 Mar 2018 03:11)  HR: 99 (17 Mar 2018 15:33) (96 - 105)  BP: 99/65 (17 Mar 2018 15:33) (98/59 - 112/62)  BP(mean): 74 (17 Mar 2018 03:11) (74 - 86)  RR: 18 (17 Mar 2018 15:33) (18 - 18)  SpO2: 98% (17 Mar 2018 15:33) (96% - 99%)    LABS:                     8.5    15.7  )-----------( 442      ( 17 Mar 2018 07:15 )             26.2       03-17    138  |  106  |  43<H>  ----------------------------<  97  4.7   |  22  |  2.44<H>    Ca    8.9      17 Mar 2018 07:15    TPro  6.6  /  Alb  3.0<L>  /  TBili  0.6  /  DBili  x   /  AST  15  /  ALT  23  /  AlkPhos  86  03-17    CAPILLARY BLOOD GLUCOSE    POCT Blood Glucose.: 157 mg/dL (17 Mar 2018 11:17)  POCT Blood Glucose.: 98 mg/dL (17 Mar 2018 07:35)  POCT Blood Glucose.: 117 mg/dL (16 Mar 2018 21:51)  POCT Blood Glucose.: 106 mg/dL (16 Mar 2018 19:43)    LOWER EXTREMITY PHYSICAL EXAM:    Vasular: DP/PT 2/4, B/L, CFT <3 seconds B/L, Temperature gradient WNL, B/L.   Neuro: Epicritic sensation intact to the level of the digits, B/L.  Musculoskeletal/Ortho: Severe HAV deformity, R>L. Painful to palpation to medial aspect of 1st MTPJ right foot. Minimal pain with 1st MTPJ range of motion. Palpable fluid filled like sac medial to 1st MTPJ.  Skin: Small area of hyperpigmentation medial 1st MTPJ, R foot. No erythema, no edema      RADIOLOGY & ADDITIONAL STUDIES:  xray foot right ordered

## 2018-03-17 NOTE — PROGRESS NOTE ADULT - SUBJECTIVE AND OBJECTIVE BOX
Subjective: Reports feeling okay today. He has not yet been up out of bed today. Noted worsening dyspnea with ambulation/stairs while working with PT compared to day prior. Endorses infrequent productive cough with white sputum. Denies SOB at rest, lightheadedness, dizziness, orthopnea, PND, abdominal fullness/bloating, fevers, chills.    MEDICATIONS  (STANDING):  ALBUTerol/ipratropium for Nebulization 3 milliLiter(s) Nebulizer every 6 hours  aspirin enteric coated 81 milliGRAM(s) Oral daily  atovaquone Suspension 1500 milliGRAM(s) Oral daily  Calcium Citrate + Vit D, 315 mg/200 Unit 2 Tablet(s) 2 Tablet(s) Oral two times a day  diltiazem    milliGRAM(s) Oral daily  docusate sodium 100 milliGRAM(s) Oral three times a day  enoxaparin Injectable 80 milliGRAM(s) SubCutaneous <User Schedule>  insulin lispro (HumaLOG) corrective regimen sliding scale   SubCutaneous three times a day before meals  insulin lispro (HumaLOG) corrective regimen sliding scale   SubCutaneous at bedtime  magnesium oxide 400 milliGRAM(s) Oral three times a day with meals  multivitamin 1 Tablet(s) Oral daily  mycophenolate mofetil 1000 milliGRAM(s) Oral <User Schedule>  nystatin    Suspension 735642 Unit(s) Oral every 6 hours  pantoprazole    Tablet 40 milliGRAM(s) Oral before breakfast  pravastatin 20 milliGRAM(s) Oral at bedtime  predniSONE   Tablet 10 milliGRAM(s) Oral every 12 hours  silver sulfADIAZINE 1% Cream 1 Application(s) Topical two times a day  tacrolimus 7 milliGRAM(s) Oral <User Schedule>  valGANciclovir 450 milliGRAM(s) Oral <User Schedule>      Physical Exam:    Vital Signs Last 24 Hrs  T(C): 36.8 (17 Mar 2018 07:50), Max: 36.9 (17 Mar 2018 03:11)  T(F): 98.3 (17 Mar 2018 07:50), Max: 98.4 (17 Mar 2018 03:11)  HR: 99 (17 Mar 2018 07:50) (96 - 106)  BP: 112/62 (17 Mar 2018 07:50) (100/66 - 138/75)  BP(mean): 74 (17 Mar 2018 03:11) (74 - 98)  RR: 18 (17 Mar 2018 07:50) (18 - 18)  SpO2: 97% (17 Mar 2018 07:50) (96% - 99%)    I&O's Summary    16 Mar 2018 07:01  -  17 Mar 2018 07:00  --------------------------------------------------------  IN: 530 mL / OUT: 1750 mL / NET: -1220 mL    17 Mar 2018 07:01  -  17 Mar 2018 08:29  --------------------------------------------------------  IN: 360 mL / OUT: 0 mL / NET: 360 mL      General: Lying in bed. No distress. Comfortable.  Neck: Neck supple. JVP ~ 10cm H2O.  Chest: Diminished RLL, otherwise CTA bilaterally.  CV: Tachycardic, S1, S2. No murmurs, rub, or gallops. RUE mild edema, ace wrap in place. No BLE edema.  Abdomen: rounded, soft, non-distended, non-tender, + BS  Skin:RLQ wound vac in place without drainage. Midline abdominal wound with gauze in place c/d/i.  Sternal incision with steristrips, small area of superficial dehiscence at top of sternal incision, no drainage, remainder of sternal incision well approximated with steristrips in place. No erythema or drainage noted.  Neurology: Alert and oriented times three. Sensation intact  Psych: Affect normal    Labs:                        8.5    15.7  )-----------( 442      ( 17 Mar 2018 07:15 )             26.2     03-17    138  |  106  |  43<H>  ----------------------------<  97  4.7   |  22  |  2.44<H>    Ca    8.9      17 Mar 2018 07:15    TPro  6.6  /  Alb  3.0<L>  /  TBili  0.6  /  DBili  x   /  AST  15  /  ALT  23  /  AlkPhos  86  03-17               Culture - Sputum . (03.14.18 @ 21:44)    Gram Stain:   No polymorphonuclear leukocytes per low power field  Few Squamous epithelial cells per low power field  Few Gram Positive Cocci in Clusters per oil power field  Results consistent with oropharyngeal contamination    Specimen Source: .Sputum Sputum    Culture Results:   No growth to date.    Hepatitis C Virus RNA Detection by PCR: 54524 IU/mL        Tele: [facility] HR  Subjective:     MEDICATIONS  (STANDING):  ALBUTerol/ipratropium for Nebulization 3 milliLiter(s) Nebulizer every 6 hours  aspirin enteric coated 81 milliGRAM(s) Oral daily  atovaquone Suspension 1500 milliGRAM(s) Oral daily  Calcium Citrate + Vit D, 315 mg/200 Unit 2 Tablet(s) 2 Tablet(s) Oral two times a day  diltiazem    milliGRAM(s) Oral daily  docusate sodium 100 milliGRAM(s) Oral three times a day  enoxaparin Injectable 80 milliGRAM(s) SubCutaneous <User Schedule>  insulin lispro (HumaLOG) corrective regimen sliding scale   SubCutaneous three times a day before meals  insulin lispro (HumaLOG) corrective regimen sliding scale   SubCutaneous at bedtime  magnesium oxide 400 milliGRAM(s) Oral three times a day with meals  multivitamin 1 Tablet(s) Oral daily  mycophenolate mofetil 1000 milliGRAM(s) Oral <User Schedule>  nystatin    Suspension 526403 Unit(s) Oral every 6 hours  pantoprazole    Tablet 40 milliGRAM(s) Oral before breakfast  pravastatin 20 milliGRAM(s) Oral at bedtime  predniSONE   Tablet 10 milliGRAM(s) Oral every 12 hours  silver sulfADIAZINE 1% Cream 1 Application(s) Topical two times a day  tacrolimus 7 milliGRAM(s) Oral <User Schedule>  valGANciclovir 450 milliGRAM(s) Oral <User Schedule>      Vital Signs Last 24 Hrs  T(C): 36.8 (17 Mar 2018 07:50), Max: 36.9 (17 Mar 2018 03:11)  T(F): 98.3 (17 Mar 2018 07:50), Max: 98.4 (17 Mar 2018 03:11)  HR: 99 (17 Mar 2018 07:50) (96 - 106)  BP: 112/62 (17 Mar 2018 07:50) (100/66 - 138/75)  BP(mean): 74 (17 Mar 2018 03:11) (74 - 98)  RR: 18 (17 Mar 2018 07:50) (18 - 18)  SpO2: 97% (17 Mar 2018 07:50) (96% - 99%)    I&O's Summary    16 Mar 2018 07:01  -  17 Mar 2018 07:00  --------------------------------------------------------  IN: 530 mL / OUT: 1750 mL / NET: -1220 mL    17 Mar 2018 07:01  -  17 Mar 2018 08:29  --------------------------------------------------------  IN: 360 mL / OUT: 0 mL / NET: 360 mL    Physical Exam:  General: Lying in bed. No distress. Comfortable.  Neck:   Chest: Diminished RLL, otherwise CTA bilaterally.  CV: Tachycardic, S1, S2. No murmurs, rub, or gallops. RUE mild edema, ace wrap in place. No BLE edema.  Abdomen: rounded, soft, non-distended, non-tender, + BS  Skin: RLQ wound vac in place without drainage. Midline abdominal wound with gauze in place c/d/i.  Sternal incision with steristrips, small area of superficial dehiscence at top of sternal incision, no drainage, remainder of sternal incision well approximated with steristrips in place. No erythema or drainage noted.  Neurology: Alert and oriented times three. Sensation intact  Psych: Affect normal    Labs:                        8.5    15.7  )-----------( 442      ( 17 Mar 2018 07:15 )             26.2     03-17    138  |  106  |  43<H>  ----------------------------<  97  4.7   |  22  |  2.44<H>    Ca    8.9      17 Mar 2018 07:15    TPro  6.6  /  Alb  3.0<L>  /  TBili  0.6  /  DBili  x   /  AST  15  /  ALT  23  /  AlkPhos  86  03-17               Culture - Sputum . (03.14.18 @ 21:44)    Gram Stain:   No polymorphonuclear leukocytes per low power field  Few Squamous epithelial cells per low power field  Few Gram Positive Cocci in Clusters per oil power field  Results consistent with oropharyngeal contamination    Specimen Source: .Sputum Sputum    Culture Results:   No growth to date.    Hepatitis C Virus RNA Detection by PCR: [id number] IU/mL    Tacrolimus levels:  3/16	13.4  3/15	15.6  3/14	12.0  3/13	10.9      Tele: - ST HR  Subjective: He reports excellent urine output with lasix dosing yesterday. Has not tested his breathing with any activity other than going to bathroom. Feels like today is a good day! Noted pain in right bunion and there is mild bruising at the site. No erythema and not tender to touch, just with weight bearing.    MEDICATIONS  (STANDING):  ALBUTerol/ipratropium for Nebulization 3 milliLiter(s) Nebulizer every 6 hours  aspirin enteric coated 81 milliGRAM(s) Oral daily  atovaquone Suspension 1500 milliGRAM(s) Oral daily  Calcium Citrate + Vit D, 315 mg/200 Unit 2 Tablet(s) 2 Tablet(s) Oral two times a day  diltiazem    milliGRAM(s) Oral daily  docusate sodium 100 milliGRAM(s) Oral three times a day  enoxaparin Injectable 80 milliGRAM(s) SubCutaneous <User Schedule>  insulin lispro (HumaLOG) corrective regimen sliding scale   SubCutaneous three times a day before meals  insulin lispro (HumaLOG) corrective regimen sliding scale   SubCutaneous at bedtime  magnesium oxide 400 milliGRAM(s) Oral three times a day with meals  multivitamin 1 Tablet(s) Oral daily  mycophenolate mofetil 1000 milliGRAM(s) Oral <User Schedule>  nystatin    Suspension 546763 Unit(s) Oral every 6 hours  pantoprazole    Tablet 40 milliGRAM(s) Oral before breakfast  pravastatin 20 milliGRAM(s) Oral at bedtime  predniSONE   Tablet 10 milliGRAM(s) Oral every 12 hours  silver sulfADIAZINE 1% Cream 1 Application(s) Topical two times a day  tacrolimus 7 milliGRAM(s) Oral <User Schedule>  valGANciclovir 450 milliGRAM(s) Oral <User Schedule>      Vital Signs Last 24 Hrs  T(C): 36.8 (17 Mar 2018 07:50), Max: 36.9 (17 Mar 2018 03:11)  T(F): 98.3 (17 Mar 2018 07:50), Max: 98.4 (17 Mar 2018 03:11)  HR: 99 (17 Mar 2018 07:50) (96 - 106)  BP: 112/62 (17 Mar 2018 07:50) (100/66 - 138/75)  BP(mean): 74 (17 Mar 2018 03:11) (74 - 98)  RR: 18 (17 Mar 2018 07:50) (18 - 18)  SpO2: 97% (17 Mar 2018 07:50) (96% - 99%)    I&O's Summary    16 Mar 2018 07:01  -  17 Mar 2018 07:00  --------------------------------------------------------  IN: 530 mL / OUT: 1750 mL / NET: -1220 mL    17 Mar 2018 07:01  -  17 Mar 2018 08:29  --------------------------------------------------------  IN: 360 mL / OUT: 0 mL / NET: 360 mL    Physical Exam:  General: Lying in bed. No distress. Comfortable.  Neck:   Chest: Diminished RLL, otherwise CTA bilaterally.  CV: Tachycardic, S1, S2. No murmurs, rub, or gallops. RUE mild edema, ace wrap in place. No BLE edema.  Abdomen: rounded, soft, non-distended, non-tender, + BS  Skin: RLQ wound vac in place without drainage. Midline abdominal wound with gauze in place c/d/i.  Sternal incision with steristrips, small area of superficial dehiscence at top of sternal incision, no drainage, remainder of sternal incision well approximated with steristrips in place. No erythema or drainage noted.  Neurology: Alert and oriented times three. Sensation intact  Psych: Affect normal    Labs:                        8.5    15.7  )-----------( 442      ( 17 Mar 2018 07:15 )             26.2     03-17    138  |  106  |  43<H>  ----------------------------<  97  4.7   |  22  |  2.44<H>    Ca    8.9      17 Mar 2018 07:15    TPro  6.6  /  Alb  3.0<L>  /  TBili  0.6  /  DBili  x   /  AST  15  /  ALT  23  /  AlkPhos  86  03-17               Culture - Sputum . (03.14.18 @ 21:44)    Gram Stain:   No polymorphonuclear leukocytes per low power field  Few Squamous epithelial cells per low power field  Few Gram Positive Cocci in Clusters per oil power field  Results consistent with oropharyngeal contamination    Specimen Source: .Sputum Sputum    Culture Results:   No growth to date.    Hepatitis C Virus RNA Detection by PCR: 94415 IU/mL    Tacrolimus levels:  3/17 	12.1  3/16	13.4  3/15	15.6  3/14	12.0  3/13	10.9    Tele: SR in 90s, no events

## 2018-03-17 NOTE — PROGRESS NOTE ADULT - ASSESSMENT
Assessment:  1.  Bilateral, extensive upper extremity DVT  - Currently on lovenox  - Swelling of RUE controlled with elevation and ace wrap  2.  Heart Transplant  3.  Acute renal failure  4.  Right 3rf digit ischemia - stable  5.  Right foot bunion      Plan  1.  Would stop lovenox in setting of rising creatinine  2. Cannot use IV heparin, cannot use IV argatraban because of limited IV access, and Fondaparanux cannot be used due to elevated creatinine  3.  Discussed with pharmacy - can use heparin sub Q for full dose therapeutic anticoagulation : Start heparin 250 units per Kilogram every 12 hours.  Would start this 24 hours after last Lovenox dose (due to elevated creat)  4.  The distal pulses are strong, but he has a small area on the right foot / water blister - may need podiatry to offer him an offloading shoe

## 2018-03-17 NOTE — PROVIDER CONTACT NOTE (CRITICAL VALUE NOTIFICATION) - ACTION/TREATMENT ORDERED:
continue to monitor
Give dextrose insulin and repeat
Hold Heparin and Integrilin gtts.  Will be ordering transfusion of PRBC x 2 units.
SHELLY Lemons made aware. No interventions at this time. Will continue to monitor.
no new orders will continue to monitor
1 unit PRBC to be ordered and transfused. Type and screen available.
No treatment at present, remains on protonix as ordered
nelida duron/pat

## 2018-03-17 NOTE — PROGRESS NOTE ADULT - SUBJECTIVE AND OBJECTIVE BOX
VITAL SIGNS    Telemetry:  SR   Vital Signs Last 24 Hrs  T(C): 36.8 (18 @ 15:33), Max: 36.9 (18 @ 03:11)  T(F): 98.3 (-18 @ 15:33), Max: 98.4 (18 @ 03:11)  HR: 99 (18 @ 15:33) (96 - 105)  BP: 99/65 (-18 @ 15:33) (98/59 - 112/62)  RR: 18 (18 @ 15:33) (18 - 18)  SpO2: 98% (18 @ 15:33) (96% - 99%)             @ 07:01  -   @ 07:00  --------------------------------------------------------  IN: 530 mL / OUT: 1750 mL / NET: -1220 mL     @ 07:01  -   @ 17:37  --------------------------------------------------------  IN: 720 mL / OUT: 0 mL / NET: 720 mL      Daily Weight in k.8 (17 Mar 2018 03:11)  Admit Wt: Drug Dosing Weight  Height (cm): 172.7 (2018 08:00)  Weight (kg): 77.7 (2018 08:00)  BMI (kg/m2): 26.1 (2018 08:00)  BSA (m2): 1.91 (2018 08:00)    Bilirubin Total, Serum: 0.6 mg/dL ( @ 07:15)    POCT Blood Glucose.: 157 mg/dL (17 Mar 2018 11:17)  POCT Blood Glucose.: 98 mg/dL (17 Mar 2018 07:35)  POCT Blood Glucose.: 117 mg/dL (16 Mar 2018 21:51)  POCT Blood Glucose.: 106 mg/dL (16 Mar 2018 19:43)          MEDICATIONS  acetaminophen   Tablet. 650 milliGRAM(s) Oral every 6 hours PRN  ALBUTerol/ipratropium for Nebulization 3 milliLiter(s) Nebulizer every 6 hours  aspirin enteric coated 81 milliGRAM(s) Oral daily  atovaquone Suspension 1500 milliGRAM(s) Oral daily  Calcium Citrate + Vit D, 315 mg/200 Unit 2 Tablet(s) 2 Tablet(s) Oral two times a day  diltiazem    milliGRAM(s) Oral daily  docusate sodium 100 milliGRAM(s) Oral three times a day  insulin lispro (HumaLOG) corrective regimen sliding scale   SubCutaneous three times a day before meals  insulin lispro (HumaLOG) corrective regimen sliding scale   SubCutaneous at bedtime  magnesium oxide 400 milliGRAM(s) Oral three times a day with meals  multivitamin 1 Tablet(s) Oral daily  mycophenolate mofetil 1000 milliGRAM(s) Oral <User Schedule>  nystatin    Suspension 154008 Unit(s) Oral every 6 hours  pantoprazole    Tablet 40 milliGRAM(s) Oral before breakfast  pravastatin 20 milliGRAM(s) Oral at bedtime  predniSONE   Tablet 10 milliGRAM(s) Oral every 12 hours  silver sulfADIAZINE 1% Cream 1 Application(s) Topical two times a day  tacrolimus 7 milliGRAM(s) Oral <User Schedule>  valGANciclovir 450 milliGRAM(s) Oral <User Schedule>      PHYSICAL EXAM  Subjective: NAD  Neurology: alert and oriented x 3, nonfocal, no gross deficits  CV : S1S2  Sternal Wound :  CDI , Stable  Lungs: CTA  Abdomen: soft, NT,ND, (+ )BM  :  voiding  Extremities:   right bunion and blister    LABS      138  |  106  |  43<H>  ----------------------------<  97  4.7   |  22  |  2.44<H>    Ca    8.9      17 Mar 2018 07:15    TPro  6.6  /  Alb  3.0<L>  /  TBili  0.6  /  DBili  x   /  AST  15  /  ALT  23  /  AlkPhos  86                                   8.5    15.7  )-----------( 442      ( 17 Mar 2018 07:15 )             26.2                 PAST MEDICAL & SURGICAL HISTORY:  GIB (gastrointestinal bleeding)  Ventricular fibrillation: s/p AICD  PAF (paroxysmal atrial fibrillation): on xarelto  Non-Ischemic Cardiomyopathy  SVT (Supraventricular Tachycardia)  HTN  CHF (Congestive Heart Failure)  LVAD (left ventricular assist device) present  Status post left hip replacement  History of Prior Ablation Treatment: for afib  AICD (Automatic Cardioverter/Defibrillator) Present: [employer] with 1 [employer] lead09- explanted and replaced with [employer] 2 leads on 09

## 2018-03-17 NOTE — CONSULT NOTE ADULT - ASSESSMENT
68 y/o M w/ R foot medial 1st MTPJ pain  - Pt seen and examined  - Following choloraprep to area, lanced area of tenderness using #11 blade. No serous drainage, no purulence. Sanguinous drainage  - No acute SOI  - Suspect possible bursitis vs acute gout attack  - Xrays ordered  - ESR, CRP and uric acid ordered  - Dressed area with SSD and DSD  - Will d/w attending  - Will cont to follow

## 2018-03-17 NOTE — CONSULT NOTE ADULT - CONSULT REQUESTED BY NAME
Annabelle Mccabe
CT surgery
Dr. Gann
Dr. LAWLER
Dr. LUCY Parker (CT Surgery)
Dr. Parker
Dr. Parker
Ti Parker
Dr. Mccabe

## 2018-03-17 NOTE — CONSULT NOTE ADULT - CONSULT REQUESTED DATE/TIME
02-Mar-2018 08:44
03-Feb-2018 20:27
09-Feb-2018 12:39
09-Mar-2018 13:07
17-Mar-2018 17:55
23-Feb-2018 14:30
27-Feb-2018 11:43
09-Feb-2018 14:58
02-Jan-2018

## 2018-03-17 NOTE — PROGRESS NOTE ADULT - SUBJECTIVE AND OBJECTIVE BOX
PAGER:  [phone]               [facility] [address]              EMAIL [email or web address]   OFFICE [phone]                              ********VASCULAR MEDICINE PROGRESS NOTE********                            CC:  UEDVT    INTERVAL HISTORY:  Creatinine uptrending.  Complains of right foot bunion water blister.  No CP or SOB.  Sitting in chair.           Allergies    No Known Allergies    Intolerances    	    MEDICATIONS:  aspirin enteric coated 81 milliGRAM(s) Oral daily  diltiazem    milliGRAM(s) Oral daily  enoxaparin Injectable 80 milliGRAM(s) SubCutaneous <User Schedule>    atovaquone Suspension 1500 milliGRAM(s) Oral daily  nystatin    Suspension 347990 Unit(s) Oral every 6 hours  valGANciclovir 450 milliGRAM(s) Oral <User Schedule>    ALBUTerol/ipratropium for Nebulization 3 milliLiter(s) Nebulizer every 6 hours    acetaminophen   Tablet. 650 milliGRAM(s) Oral every 6 hours PRN    docusate sodium 100 milliGRAM(s) Oral three times a day  pantoprazole    Tablet 40 milliGRAM(s) Oral before breakfast    insulin lispro (HumaLOG) corrective regimen sliding scale   SubCutaneous three times a day before meals  insulin lispro (HumaLOG) corrective regimen sliding scale   SubCutaneous at bedtime  pravastatin 20 milliGRAM(s) Oral at bedtime  predniSONE   Tablet 10 milliGRAM(s) Oral every 12 hours    magnesium oxide 400 milliGRAM(s) Oral three times a day with meals  multivitamin 1 Tablet(s) Oral daily  mycophenolate mofetil 1000 milliGRAM(s) Oral <User Schedule>  silver sulfADIAZINE 1% Cream 1 Application(s) Topical two times a day  tacrolimus 7 milliGRAM(s) Oral <User Schedule>      PAST MEDICAL & SURGICAL HISTORY:  GIB (gastrointestinal bleeding)  Ventricular fibrillation: s/p AICD  PAF (paroxysmal atrial fibrillation): on xarelto  Non-Ischemic Cardiomyopathy  SVT (Supraventricular Tachycardia)  HTN  CHF (Congestive Heart Failure)  LVAD (left ventricular assist device) present  Status post left hip replacement  History of Prior Ablation Treatment: for afib  AICD (Automatic Cardioverter/Defibrillator) Present: [employer] with 1 [employer] lead4/1/09- explanted and replaced with [employer]onic 2 leads on 9/2/09      FAMILY HISTORY:  No pertinent family history in first degree relatives      SOCIAL HISTORY:  unchanged    REVIEW OF SYSTEMS:  CONSTITUTIONAL: No fever, weight loss, or fatigue  EYES: No eye pain, visual disturbances, or discharge  ENMT:  No difficulty hearing, tinnitus, vertigo; No sinus or throat pain  NECK: No pain or stiffness  RESPIRATORY: No cough, wheezing, chills or hemoptysis; No Shortness of Breath  CARDIOVASCULAR: No chest pain, palpitations, passing out, dizziness, or leg swelling  GASTROINTESTINAL: No abdominal or epigastric pain. No nausea, vomiting, or hematemesis; No diarrhea or constipation. No melena or hematochezia.  GENITOURINARY: No dysuria, frequency, hematuria, or incontinence  NEUROLOGICAL: No headaches, memory loss, loss of strength, numbness, or tremors  SKIN: No itching, burning, rashes, or lesions   LYMPH Nodes: No enlarged glands  ENDOCRINE: No heat or cold intolerance; No hair loss  MUSCULOSKELETAL: No joint pain or swelling; No muscle, back, or extremity pain  PSYCHIATRIC: No depression, anxiety, mood swings, or difficulty sleeping  HEME/LYMPH: No easy bruising, or bleeding gums  ALLERY AND IMMUNOLOGIC: No hives or eczema	    [x ] All others negative	  [ ] Unable to obtain    PHYSICAL EXAM:  T(C): 36.8 (03-17-18 @ 11:22), Max: 36.9 (03-17-18 @ 03:11)  HR: 96 (03-17-18 @ 11:22) (96 - 105)  BP: 98/59 (03-17-18 @ 11:22) (98/59 - 112/62)  RR: 18 (03-17-18 @ 11:22) (18 - 18)  SpO2: 96% (03-17-18 @ 11:22) (96% - 99%)  Wt(kg): --  I&O's Summary    16 Mar 2018 07:01  -  17 Mar 2018 07:00  --------------------------------------------------------  IN: 530 mL / OUT: 1750 mL / NET: -1220 mL    17 Mar 2018 07:01  -  17 Mar 2018 11:26  --------------------------------------------------------  IN: 360 mL / OUT: 0 mL / NET: 360 mL        Appearance: Normal	  HEENT:   Normal oral mucosa, PERRL, EOMI	  Lymphatic: No lymphadenopathy  Cardiovascular: Normal S1 S2   Respiratory: Lungs clear to auscultation	  Psychiatry: A & O x 3, Mood & affect appropriate  Gastrointestinal:  Soft, Non-tender, + BS	  Skin: No rashes, No ecchymoses, No cyanosis	  Neurologic: Non-focal  Extremities:  Right upper extremity bicep area fullness and edema.  Forearm without edema.  Hands are warm.  R 3rd digit is wrapped.    Right bunion waterblister.       LABS:	 	    CBC Full  -  ( 17 Mar 2018 07:15 )  WBC Count : 15.7 K/uL  Hemoglobin : 8.5 g/dL  Hematocrit : 26.2 %  Platelet Count - Automated : 442 K/uL  Mean Cell Volume : 92.4 fl  Mean Cell Hemoglobin : 29.8 pg  Mean Cell Hemoglobin Concentration : 32.3 gm/dL  Auto Neutrophil # : x  Auto Lymphocyte # : x  Auto Monocyte # : x  Auto Eosinophil # : x  Auto Basophil # : x  Auto Neutrophil % : x  Auto Lymphocyte % : x  Auto Monocyte % : x  Auto Eosinophil % : x  Auto Basophil % : x    03-17    138  |  106  |  43<H>  ----------------------------<  97  4.7   |  22  |  2.44<H>  03-16    139  |  107  |  41<H>  ----------------------------<  90  5.2   |  18<L>  |  2.15<H>    Ca    8.9      17 Mar 2018 07:15  Ca    9.0      16 Mar 2018 19:10    TPro  6.6  /  Alb  3.0<L>  /  TBili  0.6  /  DBili  x   /  AST  15  /  ALT  23  /  AlkPhos  86  03-17  TPro  6.2  /  Alb  2.9<L>  /  TBili  0.6  /  DBili  x   /  AST  16  /  ALT  26  /  AlkPhos  86  03-16

## 2018-03-17 NOTE — CONSULT NOTE ADULT - CONSULT REASON
patient for pre cardiac transplant evaluation
3rd digit pain/swelling
Anemia
DVT
LDH rising
R foot bunion pain
R middle finger discoloration
Request for Therapeutic Plasma Exchange
Elevated creatinine

## 2018-03-17 NOTE — PROVIDER CONTACT NOTE (CRITICAL VALUE NOTIFICATION) - ASSESSMENT
vss, nad noted
/75  AFEBRILE  no acute distress
No change, no bleeding noted today
No obvious s/s of bleeding @ this time.
No overt s/s of bleeding.
Patient is asymptomatic
Vital signs, MAP 80 (86/74), RR 20, SpO2 100%, HR 87.
WBC 41.8
cr 2.15  bun 41

## 2018-03-18 DIAGNOSIS — Z94.1 HEART TRANSPLANT STATUS: ICD-10-CM

## 2018-03-18 LAB
ALBUMIN SERPL ELPH-MCNC: 3.2 G/DL — LOW (ref 3.3–5)
ALP SERPL-CCNC: 97 U/L — SIGNIFICANT CHANGE UP (ref 40–120)
ALT FLD-CCNC: 23 U/L RC — SIGNIFICANT CHANGE UP (ref 10–45)
ANION GAP SERPL CALC-SCNC: 12 MMOL/L — SIGNIFICANT CHANGE UP (ref 5–17)
APPEARANCE UR: ABNORMAL
AST SERPL-CCNC: 18 U/L — SIGNIFICANT CHANGE UP (ref 10–40)
BACTERIA # UR AUTO: ABNORMAL /HPF
BILIRUB SERPL-MCNC: 0.5 MG/DL — SIGNIFICANT CHANGE UP (ref 0.2–1.2)
BILIRUB UR-MCNC: NEGATIVE — SIGNIFICANT CHANGE UP
BUN SERPL-MCNC: 51 MG/DL — HIGH (ref 7–23)
CALCIUM SERPL-MCNC: 9.4 MG/DL — SIGNIFICANT CHANGE UP (ref 8.4–10.5)
CHLORIDE SERPL-SCNC: 106 MMOL/L — SIGNIFICANT CHANGE UP (ref 96–108)
CHLORIDE UR-SCNC: <35 MMOL/L — SIGNIFICANT CHANGE UP
CO2 SERPL-SCNC: 20 MMOL/L — LOW (ref 22–31)
COLOR SPEC: YELLOW — SIGNIFICANT CHANGE UP
COMMENT - URINE: SIGNIFICANT CHANGE UP
CREAT ?TM UR-MCNC: 133 MG/DL — SIGNIFICANT CHANGE UP
CREAT SERPL-MCNC: 3.13 MG/DL — HIGH (ref 0.5–1.3)
CRP SERPL-MCNC: 3.1 MG/DL — HIGH (ref 0–0.4)
DIFF PNL FLD: ABNORMAL
ERYTHROCYTE [SEDIMENTATION RATE] IN BLOOD: 44 MM/HR — HIGH (ref 0–20)
GLUCOSE BLDC GLUCOMTR-MCNC: 113 MG/DL — HIGH (ref 70–99)
GLUCOSE BLDC GLUCOMTR-MCNC: 116 MG/DL — HIGH (ref 70–99)
GLUCOSE BLDC GLUCOMTR-MCNC: 152 MG/DL — HIGH (ref 70–99)
GLUCOSE BLDC GLUCOMTR-MCNC: 157 MG/DL — HIGH (ref 70–99)
GLUCOSE SERPL-MCNC: 101 MG/DL — HIGH (ref 70–99)
GLUCOSE UR QL: NEGATIVE — SIGNIFICANT CHANGE UP
HBA1C BLD-MCNC: 5.3 % — SIGNIFICANT CHANGE UP (ref 4–5.6)
HCT VFR BLD CALC: 26.6 % — LOW (ref 39–50)
HGB BLD-MCNC: 8.6 G/DL — LOW (ref 13–17)
HYALINE CASTS # UR AUTO: ABNORMAL
KETONES UR-MCNC: NEGATIVE — SIGNIFICANT CHANGE UP
LEUKOCYTE ESTERASE UR-ACNC: NEGATIVE — SIGNIFICANT CHANGE UP
MCHC RBC-ENTMCNC: 29.6 PG — SIGNIFICANT CHANGE UP (ref 27–34)
MCHC RBC-ENTMCNC: 32.3 GM/DL — SIGNIFICANT CHANGE UP (ref 32–36)
MCV RBC AUTO: 91.7 FL — SIGNIFICANT CHANGE UP (ref 80–100)
NITRITE UR-MCNC: NEGATIVE — SIGNIFICANT CHANGE UP
PH UR: 6 — SIGNIFICANT CHANGE UP (ref 5–8)
PLATELET # BLD AUTO: 445 K/UL — HIGH (ref 150–400)
POTASSIUM SERPL-MCNC: 4.9 MMOL/L — SIGNIFICANT CHANGE UP (ref 3.5–5.3)
POTASSIUM SERPL-SCNC: 4.9 MMOL/L — SIGNIFICANT CHANGE UP (ref 3.5–5.3)
POTASSIUM UR-SCNC: 26 MMOL/L — SIGNIFICANT CHANGE UP
PROT SERPL-MCNC: 6.6 G/DL — SIGNIFICANT CHANGE UP (ref 6–8.3)
PROT UR-MCNC: 100 MG/DL
RBC # BLD: 2.9 M/UL — LOW (ref 4.2–5.8)
RBC # FLD: 16.3 % — HIGH (ref 10.3–14.5)
RBC CASTS # UR COMP ASSIST: SIGNIFICANT CHANGE UP /HPF (ref 0–2)
SODIUM SERPL-SCNC: 138 MMOL/L — SIGNIFICANT CHANGE UP (ref 135–145)
SODIUM UR-SCNC: 35 MMOL/L — SIGNIFICANT CHANGE UP
SP GR SPEC: 1.02 — SIGNIFICANT CHANGE UP (ref 1.01–1.02)
TACROLIMUS SERPL-MCNC: 11.5 NG/ML — SIGNIFICANT CHANGE UP
TACROLIMUS SERPL-MCNC: 13.4 NG/ML — SIGNIFICANT CHANGE UP
URATE SERPL-MCNC: 7.5 MG/DL — SIGNIFICANT CHANGE UP (ref 3.4–8.8)
UROBILINOGEN FLD QL: NEGATIVE — SIGNIFICANT CHANGE UP
UUN UR-MCNC: 618 MG/DL — SIGNIFICANT CHANGE UP
WBC # BLD: 15.6 K/UL — HIGH (ref 3.8–10.5)
WBC # FLD AUTO: 15.6 K/UL — HIGH (ref 3.8–10.5)
WBC UR QL: SIGNIFICANT CHANGE UP /HPF (ref 0–5)

## 2018-03-18 PROCEDURE — 99233 SBSQ HOSP IP/OBS HIGH 50: CPT

## 2018-03-18 PROCEDURE — 99233 SBSQ HOSP IP/OBS HIGH 50: CPT | Mod: GC

## 2018-03-18 PROCEDURE — 71045 X-RAY EXAM CHEST 1 VIEW: CPT | Mod: 26

## 2018-03-18 RX ORDER — TACROLIMUS 5 MG/1
6 CAPSULE ORAL
Qty: 0 | Refills: 0 | Status: DISCONTINUED | OUTPATIENT
Start: 2018-03-18 | End: 2018-03-18

## 2018-03-18 RX ORDER — DILTIAZEM HCL 120 MG
120 CAPSULE, EXT RELEASE 24 HR ORAL DAILY
Qty: 0 | Refills: 0 | Status: DISCONTINUED | OUTPATIENT
Start: 2018-03-19 | End: 2018-03-23

## 2018-03-18 RX ORDER — TACROLIMUS 5 MG/1
7 CAPSULE ORAL
Qty: 0 | Refills: 0 | Status: DISCONTINUED | OUTPATIENT
Start: 2018-03-19 | End: 2018-03-19

## 2018-03-18 RX ADMIN — Medication 650 MILLIGRAM(S): at 20:01

## 2018-03-18 RX ADMIN — HEPARIN SODIUM 18500 UNIT(S): 5000 INJECTION INTRAVENOUS; SUBCUTANEOUS at 06:33

## 2018-03-18 RX ADMIN — TACROLIMUS 7 MILLIGRAM(S): 5 CAPSULE ORAL at 08:09

## 2018-03-18 RX ADMIN — MAGNESIUM OXIDE 400 MG ORAL TABLET 400 MILLIGRAM(S): 241.3 TABLET ORAL at 08:09

## 2018-03-18 RX ADMIN — MAGNESIUM OXIDE 400 MG ORAL TABLET 400 MILLIGRAM(S): 241.3 TABLET ORAL at 11:39

## 2018-03-18 RX ADMIN — ATOVAQUONE 1500 MILLIGRAM(S): 750 SUSPENSION ORAL at 11:39

## 2018-03-18 RX ADMIN — Medication 10 MILLIGRAM(S): at 18:03

## 2018-03-18 RX ADMIN — Medication 1 TABLET(S): at 11:39

## 2018-03-18 RX ADMIN — Medication 81 MILLIGRAM(S): at 11:41

## 2018-03-18 RX ADMIN — Medication 10 MILLIGRAM(S): at 06:32

## 2018-03-18 RX ADMIN — Medication 1 APPLICATION(S): at 18:03

## 2018-03-18 RX ADMIN — Medication 500000 UNIT(S): at 00:45

## 2018-03-18 RX ADMIN — Medication 500000 UNIT(S): at 06:33

## 2018-03-18 RX ADMIN — Medication 500000 UNIT(S): at 11:39

## 2018-03-18 RX ADMIN — Medication 3 MILLILITER(S): at 06:32

## 2018-03-18 RX ADMIN — Medication 3 MILLILITER(S): at 11:39

## 2018-03-18 RX ADMIN — Medication 650 MILLIGRAM(S): at 20:30

## 2018-03-18 RX ADMIN — Medication 2: at 11:39

## 2018-03-18 RX ADMIN — Medication 500000 UNIT(S): at 23:58

## 2018-03-18 RX ADMIN — Medication 3 MILLILITER(S): at 23:58

## 2018-03-18 RX ADMIN — MYCOPHENOLATE MOFETIL 1000 MILLIGRAM(S): 250 CAPSULE ORAL at 08:09

## 2018-03-18 RX ADMIN — PANTOPRAZOLE SODIUM 40 MILLIGRAM(S): 20 TABLET, DELAYED RELEASE ORAL at 06:32

## 2018-03-18 RX ADMIN — MYCOPHENOLATE MOFETIL 1000 MILLIGRAM(S): 250 CAPSULE ORAL at 20:00

## 2018-03-18 RX ADMIN — Medication 3 MILLILITER(S): at 18:03

## 2018-03-18 RX ADMIN — Medication 3 MILLILITER(S): at 00:45

## 2018-03-18 RX ADMIN — Medication 20 MILLIGRAM(S): at 21:12

## 2018-03-18 RX ADMIN — Medication 180 MILLIGRAM(S): at 06:32

## 2018-03-18 RX ADMIN — TACROLIMUS 6 MILLIGRAM(S): 5 CAPSULE ORAL at 20:00

## 2018-03-18 RX ADMIN — Medication 1 APPLICATION(S): at 06:32

## 2018-03-18 RX ADMIN — Medication 500000 UNIT(S): at 18:03

## 2018-03-18 RX ADMIN — HEPARIN SODIUM 18500 UNIT(S): 5000 INJECTION INTRAVENOUS; SUBCUTANEOUS at 18:03

## 2018-03-18 NOTE — PROGRESS NOTE ADULT - ASSESSMENT
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 Found to have b/l IJ/subclavian thrombi as well.    #Biopsies  EMB #1: 1R/1A, AMR 1. DSA negative.  EMB #2: 1R/1A, AMR 1. DSA negative.  EMB #3: 1R/1A. stable AMR1. No DSA. RA 12, PA 40/16/27, PCW 11, PA 67%, CO/CI 6.8/3.6. TTE with mild RV enlargement and dysfunction.  EMB #4: to be scheduled for urs, 3/22, 8:30 AM    # Immunosuppression prophylaxis:  Tacrolimus - continue 7 mg PO Q12. Target trough level 12-14. Given the rise in SCr, level will likely go up, but we are reducing the Cardizem, as well, so no change in tacro dose today.  CellCept - continue 1000 mg PO BID. Would not reduce dose despite his age given AMR 1 rejection.  Steroids - continue prednisone 10 mg Q12  	  # Antimicrobial prophylaxis:  Intermediate risk CMV - Adjust Valcyte for NORMAN to 450 mg TWICE weekly  Intermediate risk Toxo - continue Mepron 1500mg PO daily with food.  PCP - continue Mepron 1500mg PO daily  Thrush - continue Nystatin S/S 5 mL QID    # Additional HCM:  - Citracal + D 2 tabs PO BID  - multivitamin 1 tab daily  - Stress ulcer ppx while on steriods: protonix to 40mg PO daily  - Mag oxide 400mg PO TID (Mg wasting due to tacrolimus). Please check daily Mg level. If elevated, will need to stop MgOx.    # Bilateral IJ/Subclavian thrombi - unchanged on f/u U/S 3/12  - Lovenox 80mg subcutaneous Q12 x 3 months  - CHERIE negative    # CAV PPx  - Continue ECASA 81mg PO daily  - Continue pravastatin 20mg PO QHS    #NORMAN likely related to the Vancomycin dosing  - Vanco stopped. DLES looked very good yesterday and WBC trending down  - He responded to diuretics given yesterday. No further diuresis today.  - Please check daily weights and track urine output (NO HOBSON)    #Hypertension  - He has had a few BP readings with SBP < 100. Please reduce cardizem CD to 120 PO daily.    # ID   - Pseudomonas in sputum - course of cefepime completed 3/15  - DLES improved and vancomycin stopped given high trough with NORMAN  - silver sulfadizaine dressings per plastics for R 3rd digit.   - HCV viral load and PCR per protocol. Therapy to be initiated as outpatient by Dr. 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 Found to have b/l IJ/subclavian thrombi as well.    #Biopsies  EMB #1: 1R/1A, AMR 1. DSA negative.  EMB #2: 1R/1A, AMR 1. DSA negative.  EMB #3: 1R/1A. stable AMR1. No DSA. RA 12, PA 40/16/27, PCW 11, PA 67%, CO/CI 6.8/3.6. TTE with mild RV enlargement and dysfunction.  EMB #4: to be scheduled for Thurs, 3/22, 8:30 AM    # Immunosuppression prophylaxis:  Tacrolimus - reduce to 6 mg PO Q12. Please check a level tonight between 7-7:30 pm. Target trough level 12-14, but it may rise given reduced eGFR.  CellCept - continue 1000 mg PO BID. Would not reduce dose despite his age given AMR 1 rejection.  Steroids - continue prednisone 10 mg Q12  	  # Antimicrobial prophylaxis:  Intermediate risk CMV - Adjust Valcyte for NORMAN to 450 mg daily just TWICE weekly. Last dose on Saturday so will schedule for Tues/Sat.  Intermediate risk Toxo - continue Mepron 1500mg PO daily with food.  PCP - continue Mepron 1500mg PO daily  Thrush - continue Nystatin S/S 5 mL QID    # Additional HCM:  - Citracal + D 2 tabs PO BID  - multivitamin 1 tab daily  - Stress ulcer ppx while on steriods: protonix to 40mg PO daily  - Mag oxide 400mg PO TID (Mg wasting due to tacrolimus). Please check daily Mg level. Will discontinue MgOx for now.    # Bilateral IJ/Subclavian thrombi - unchanged on f/u U/S 3/12  - Lovenox 80mg subcutaneous Q12 x 3 months  - CHERIE negative    # CAV PPx  - Continue ECASA 81mg PO daily  - Continue pravastatin 20mg PO QHS    #NORMAN likely related to the Vancomycin dosing  - Vanco stopped. DLES looked very good yesterday and WBC trending down  - He responded to diuretics given on Friday. No further diuresis.  - Please check daily weights and track urine output (NO HOBSON)  - Cardio-Renal consult, please.    #Hypertension  - He has had a few BP readings with SBP < 100. Please reduce cardizem CD to 120 PO daily.    # ID   - Pseudomonas in sputum - course of cefepime completed 3/15  - DLES improved and vancomycin stopped given high trough with NORMAN  - silver sulfadizaine dressings per plastics for R 3rd digit.   - HCV viral load and PCR per protocol. Therapy to be initiated as outpatient by Dr. Thomas (Hepatology)  - Transplant ID following    #endo  - SSI AC & HS    #dispo  - Ongoing work with PT for ongoing exercise tolerance prior to d/c

## 2018-03-18 NOTE — PROGRESS NOTE ADULT - PROBLEM SELECTOR PLAN 1
S/p  heart transplant  2/23  on rejection meds with HF following,  -Tacrolimus level 13.4 this am - will discuss dosage change with DR. Mccabe- CHF team  Tacrolimus 7mg PO BID  check levels daily  with goal 12-14 per HF team   -Continue CellCept 1000 mg PO BID  -Steroid taper prednisone 10 mg PO BID     -LVAD site  with VAC in place for drainage, changed by PT 3/14  -HIT Ab positive; CHERIE negative; lovenox 80mg subcut Q12, per vasc cards will need AC for at least 3 months S/p  heart transplant  2/23  on rejection meds with HF following,  -Tacrolimus level 13.4 this am - will discuss dosage change with DR. Mccabe- CHF team  Tacrolimus 7mg PO BID  check levels daily  with goal 12-14 per HF team   -Continue CellCept 1000 mg PO BID  -Steroid taper prednisone 10 mg PO BID     -LVAD site  with VAC in place for drainage, changed by PT 3/14  -HIT Ab positive; CHERIE negative; , per vasc cards will need AC for at least 3 months

## 2018-03-18 NOTE — PROGRESS NOTE ADULT - PROBLEM SELECTOR PLAN 1
NORMAN. Cr started to rise on 3/14 with cr of 1.2 also noted to have slightly higher tacro levels at the time (12) compared to previous levels. also patinet was on vancomycin with no vanc level repeated between 3/13-3/15 and an increase vanc level from 13 to 32.7 from 3/12 to 3/16. Therefore it may have been the cause.  NORMAN from tacro and vanc likely  Would check renal sono, PVR  Check cbc with diff  check UA,  Urine cr, na, k, cl, and bun to calculate feurea,

## 2018-03-18 NOTE — PROGRESS NOTE ADULT - SUBJECTIVE AND OBJECTIVE BOX
VITAL SIGNS    Telemetry:  SR   Vital Signs Last 24 Hrs  T(C): 36.4 (18 @ 12:00), Max: 36.8 (18 @ 15:33)  T(F): 97.6 (18 @ 12:00), Max: 98.3 (18 @ 15:33)  HR: 100 (18 @ 12:00) (93 - 107)  BP: 97/54 (18 @ 12:00) (97/54 - 118/69)  RR: 18 (18 @ 12:00) (18 - 18)  SpO2: 95% (18 @ 12:00) (95% - 99%)             @ 07:01  -   @ 07:00  --------------------------------------------------------  IN: 960 mL / OUT: 1100 mL / NET: -140 mL     @ 07:01  -   @ 14:50  --------------------------------------------------------  IN: 720 mL / OUT: 420 mL / NET: 300 mL    Daily     Daily Weight in k (18 Mar 2018 06:44)  Admit Wt: Drug Dosing Weight  Height (cm): 172.7 (2018 08:00)  Weight (kg): 77.7 (2018 08:00)  BMI (kg/m2): 26.1 (2018 08:00)  BSA (m2): 1.91 (2018 08:00)    Bilirubin Total, Serum: 0.5 mg/dL ( @ 07:09)    CAPILLARY BLOOD GLUCOSE      POCT Blood Glucose.: 157 mg/dL (18 Mar 2018 11:36)  POCT Blood Glucose.: 113 mg/dL (18 Mar 2018 07:29)  POCT Blood Glucose.: 124 mg/dL (17 Mar 2018 21:49)  POCT Blood Glucose.: 118 mg/dL (17 Mar 2018 19:20)          MEDICATIONS  acetaminophen   Tablet. 650 milliGRAM(s) Oral every 6 hours PRN  ALBUTerol/ipratropium for Nebulization 3 milliLiter(s) Nebulizer every 6 hours  aspirin enteric coated 81 milliGRAM(s) Oral daily  atovaquone Suspension 1500 milliGRAM(s) Oral daily  Calcium Citrate + Vit D, 315 mg/200 Unit 2 Tablet(s) 2 Tablet(s) Oral two times a day  diltiazem    milliGRAM(s) Oral daily  docusate sodium 100 milliGRAM(s) Oral three times a day  heparin  Injectable 36598 Unit(s) SubCutaneous two times a day  insulin lispro (HumaLOG) corrective regimen sliding scale   SubCutaneous three times a day before meals  insulin lispro (HumaLOG) corrective regimen sliding scale   SubCutaneous at bedtime  magnesium oxide 400 milliGRAM(s) Oral three times a day with meals  multivitamin 1 Tablet(s) Oral daily  mycophenolate mofetil 1000 milliGRAM(s) Oral <User Schedule>  nystatin    Suspension 625553 Unit(s) Oral every 6 hours  pantoprazole    Tablet 40 milliGRAM(s) Oral before breakfast  pravastatin 20 milliGRAM(s) Oral at bedtime  predniSONE   Tablet 10 milliGRAM(s) Oral every 12 hours  silver sulfADIAZINE 1% Cream 1 Application(s) Topical two times a day  tacrolimus 7 milliGRAM(s) Oral <User Schedule>  valGANciclovir 450 milliGRAM(s) Oral <User Schedule>      PHYSICAL EXAM  Subjective: OOB to chair NAD  Neurology: alert and oriented x 3, nonfocal, no gross deficits  CV : S1S2  Sternal Wound :  CDI , Stable  Lungs: CTA b/l  Abdomen: soft, NT,ND, (+ )BM wound VAC intact  :  voiding  Extremities:  -c/c/e right foot dressing c/d/i     LABS  -    138  |  106  |  51<H>  ----------------------------<  101<H>  4.9   |  20<L>  |  3.13<H>    Ca    9.4      18 Mar 2018 07:09    TPro  6.6  /  Alb  3.2<L>  /  TBili  0.5  /  DBili  x   /  AST  18  /  ALT  23  /  AlkPhos  97  03-18                                 8.6    15.6  )-----------( 445      ( 18 Mar 2018 07:23 )             26.6                 PAST MEDICAL & SURGICAL HISTORY:  GIB (gastrointestinal bleeding)  Ventricular fibrillation: s/p AICD  PAF (paroxysmal atrial fibrillation): on xarelto  Non-Ischemic Cardiomyopathy  SVT (Supraventricular Tachycardia)  HTN  CHF (Congestive Heart Failure)  LVAD (left ventricular assist device) present  Status post left hip replacement  History of Prior Ablation Treatment: for afib  AICD (Automatic Cardioverter/Defibrillator) Present: [employer] with 1 [employer] lead09- explanted and replaced with [employer] 2 leads on 09

## 2018-03-18 NOTE — PROGRESS NOTE ADULT - ASSESSMENT
68 y/o M w/ R foot medial 1st MTPJ pain  - Pt seen and examined  - Pt denies change to pain  - No acute SOI  - Suspect possible bursitis vs acute gout attack, elevated uric acid c/w gout  - Xrays ordered, awaiting final read  - Dressed area with SSD and DSD  - Will d/w attending  - Will cont to follow

## 2018-03-18 NOTE — PROGRESS NOTE ADULT - ASSESSMENT
67M -PMH: GIB, LVAD implant, afib, anemia AICD, CHF  2/23/18 s/p  Heart transplant  Intra op acute rejection of graft and Intra-op IABP placed  and removed POD 1 and received plasmaphoresis x 2  2/26 UE +RT IJ inominate SC,cephalic DVT and +lt SC DVT  3/1  cardiac bx low grade rejection  3/2 bronchoscopy   neg   3/4  t/c too floor,  Imipenum for low grade fevers  3/5 Sputum + PSA, ID to see pts/p  Heart transplant 2/23/18   Vac changed to RLQ  3/6 Pt on cefepime  3/8 Cardiac biopsy this am - prograf level - 7- prograf level increased to 8mg po bid  toprol d/c'd,  Cardizem CD 120mg po initiated  high filling pressures noted on right heart cath-lasix 40mg po daily started as per HF  K+= 6 - Bactrim d/c'd - Mepron to start in am for prophylaxis  rpt k+ = 5.2-  daily prograf levels, daily CXR, bid CMP & CBC  plastics consult for ?necrotic right 3rd digit  Cardiac biopsy results pending- depending on biopsy results - HF to adjust prednisone taper  d/c planning  3/9 prednisone 12.5 bid.  Lasix x 1 in am tomorrow, then d/c.   Prograf 10.4, level increased from 7.9 so will need a prograf level tonight at 7 pm, Dr. Mccabe to be notified.  K elevated , if continues to rise may need  kaexalate  Finger with  possible necrosis 3rd R.  Tophus noted by rheumatology,+ crystals  specimen sent.  Hand /plastics consulted, finger  drained, may need further intervention  3/10  WBC to 29   afebrile,  ct abdomen  chest pelvis ordered,   Prograf level 11,  vss   3/11 wbc trending down.   CT Chest/Pelvis:   Small fluid collection inferior to the xiphoid process likely the site of   prior drain.-7 mm nodule in the lingula, new from prior imaging.  No evidence of infection in the abdomen or pelvis.    Hct down , if  decreases further on repeat labs , PRBC to be given  Finger  with less discomfort, no drainage today unable to culture.  Minimal drainage after it was drained on  3/9, culture not sent during   BC & UA neg 3/9 neg to date  Driveline site  cx negative from 3/5  Tacro level 14.2, D/W hf , MAINTAIN CURRENT DOSAGE  Cont IV abx  Cont lovenox for dvt  3/12 Pt with decreased hct, 1uprbc being given.  Plastics f/u , would use silvadine  on finger, no further intervention at this time  3/13 The pt ambulated on stairs.  Prograf level 10  3/12/18 Rpt. duplex: [facility] Duplex Upper Ext Vein Scan, Bilat   Nonocclusive DVT of the R internal jugular vein. Persistent occlusive   DVT in the innominate and subclavian veins. Superficial thrombosis of the   R cephalic vein.  Persistent occlusive DVT L subclavian vein. Nonocclusive DVT of   the L internal jugular and innominate veins. Nonocclusive DVT of the   axillary and brachial vein surrounding a left PICC.    Studies reviewed w Dr Tavarez, will continue lvx 80 bid  3/14 Cardiac Biopsy, resume lovenox tonight  3/14 -   RLL collapse with persistent wbc , for bronch today  with Dr Sutherland, completed  Prograf 12, dose remains at 8mg bid  /72- 125/77  with Cardizem increased yesterday to 180mg daily  PT changed wound vac  3/15 Pt reports feeling better today. K+5.6 overnight, repeat 4.6 this AM, will cont to monitor.    AFB and cultures 3/14  results pending.   Prednisone taper 10 mg today. Midline d/c'd. PIV placed.   Prograf level 15.6 will discuss decrease in Prograf dose with Dr. Annabelle Mccabe  d/c planning anticipate Home Monday 3/19/18  3/16  tacro level 13.4 on decreased prograff dose of 7.  The Midline was d/c , PIV placed.  Prednisone was decreased  on 3/14.  The wbc is down today to 17.  To d/w ID the antibiotic regimen and question if they will be needed long term.  His creat was Up to1.86 today, d/w HF lasix 20 x 1 given,   3/17 Right bunion/blister lanced by podiatry 3/18 Renal consulted for NORMAN , PVR 0cc 67M -PMH: GIB, LVAD implant, afib, anemia AICD, CHF  2/23/18 s/p  Heart transplant  Intra op acute rejection of graft and Intra-op IABP placed  and removed POD 1 and received plasmaphoresis x 2  2/26 UE +RT IJ inominate SC,cephalic DVT and +lt SC DVT  3/1  cardiac bx low grade rejection  3/2 bronchoscopy   neg   3/4  t/c too floor,  Imipenum for low grade fevers  3/5 Sputum + PSA, ID to see pts/p  Heart transplant 2/23/18   Vac changed to RLQ  3/6 Pt on cefepime  3/8 Cardiac biopsy this am - prograf level - 7- prograf level increased to 8mg po bid  toprol d/c'd,  Cardizem CD 120mg po initiated  high filling pressures noted on right heart cath-lasix 40mg po daily started as per HF  K+= 6 - Bactrim d/c'd - Mepron to start in am for prophylaxis  rpt k+ = 5.2-  daily prograf levels, daily CXR, bid CMP & CBC  plastics consult for ?necrotic right 3rd digit  Cardiac biopsy results pending- depending on biopsy results - HF to adjust prednisone taper  d/c planning  3/9 prednisone 12.5 bid.  Lasix x 1 in am tomorrow, then d/c.   Prograf 10.4, level increased from 7.9 so will need a prograf level tonight at 7 pm, Dr. Mccabe to be notified.  K elevated , if continues to rise may need  kaexalate  Finger with  possible necrosis 3rd R.  Tophus noted by rheumatology,+ crystals  specimen sent.  Hand /plastics consulted, finger  drained, may need further intervention  3/10  WBC to 29   afebrile,  ct abdomen  chest pelvis ordered,   Prograf level 11,  vss   3/11 wbc trending down.   CT Chest/Pelvis:   Small fluid collection inferior to the xiphoid process likely the site of   prior drain.-7 mm nodule in the lingula, new from prior imaging.  No evidence of infection in the abdomen or pelvis.    Hct down , if  decreases further on repeat labs , PRBC to be given  Finger  with less discomfort, no drainage today unable to culture.  Minimal drainage after it was drained on  3/9, culture not sent during   BC & UA neg 3/9 neg to date  Driveline site  cx negative from 3/5  Tacro level 14.2, D/W hf , MAINTAIN CURRENT DOSAGE  Cont IV abx  Cont lovenox for dvt  3/12 Pt with decreased hct, 1uprbc being given.  Plastics f/u , would use silvadine  on finger, no further intervention at this time  3/13 The pt ambulated on stairs.  Prograf level 10  3/12/18 Rpt. duplex: [facility] Duplex Upper Ext Vein Scan, Bilat   Nonocclusive DVT of the R internal jugular vein. Persistent occlusive   DVT in the innominate and subclavian veins. Superficial thrombosis of the   R cephalic vein.  Persistent occlusive DVT L subclavian vein. Nonocclusive DVT of   the L internal jugular and innominate veins. Nonocclusive DVT of the   axillary and brachial vein surrounding a left PICC.    Studies reviewed w Dr Tavarez, will continue lvx 80 bid  3/14 Cardiac Biopsy, resume lovenox tonight  3/14 -   RLL collapse with persistent wbc , for bronch today  with Dr Sutherland, completed  Prograf 12, dose remains at 8mg bid  /72- 125/77  with Cardizem increased yesterday to 180mg daily  PT changed wound vac  3/15 Pt reports feeling better today. K+5.6 overnight, repeat 4.6 this AM, will cont to monitor.    AFB and cultures 3/14  results pending.   Prednisone taper 10 mg today. Midline d/c'd. PIV placed.   Prograf level 15.6 will discuss decrease in Prograf dose with Dr. Annabelle Mccabe  d/c planning anticipate Home Monday 3/19/18  3/16  tacro level 13.4 on decreased prograff dose of 7.  The Midline was d/c , PIV placed.  Prednisone was decreased  on 3/14.  The wbc is down today to 17.  To d/w ID the antibiotic regimen and question if they will be needed long term.  His creat was Up to1.86 today, d/w HF lasix 20 x 1 given,   3/17 Right bunion/blister lanced by podiatry 3/18 Renal consulted for NORMAN , PVR 0cc, pending renal U/S, [place] boot 67M -PMH: GIB, LVAD implant, afib, anemia AICD, CHF  2/23/18 s/p  Heart transplant  Intra op acute rejection of graft and Intra-op IABP placed  and removed POD 1 and received plasmaphoresis x 2  2/26 UE +RT IJ inominate SC,cephalic DVT and +lt SC DVT  3/1  cardiac bx low grade rejection  3/2 bronchoscopy   neg   3/4  t/c too floor,  Imipenum for low grade fevers  3/5 Sputum + PSA, ID to see pts/p  Heart transplant 2/23/18   Vac changed to RLQ  3/6 Pt on cefepime  3/8 Cardiac biopsy this am - prograf level - 7- prograf level increased to 8mg po bid  toprol d/c'd,  Cardizem CD 120mg po initiated  high filling pressures noted on right heart cath-lasix 40mg po daily started as per HF  K+= 6 - Bactrim d/c'd - Mepron to start in am for prophylaxis  rpt k+ = 5.2-  daily prograf levels, daily CXR, bid CMP & CBC  plastics consult for ?necrotic right 3rd digit  Cardiac biopsy results pending- depending on biopsy results - HF to adjust prednisone taper  d/c planning  3/9 prednisone 12.5 bid.  Lasix x 1 in am tomorrow, then d/c.   Prograf 10.4, level increased from 7.9 so will need a prograf level tonight at 7 pm, Dr. Mccabe to be notified.  K elevated , if continues to rise may need  kaexalate  Finger with  possible necrosis 3rd R.  Tophus noted by rheumatology,+ crystals  specimen sent.  Hand /plastics consulted, finger  drained, may need further intervention  3/10  WBC to 29   afebrile,  ct abdomen  chest pelvis ordered,   Prograf level 11,  vss   3/11 wbc trending down.   CT Chest/Pelvis:   Small fluid collection inferior to the xiphoid process likely the site of   prior drain.-7 mm nodule in the lingula, new from prior imaging.  No evidence of infection in the abdomen or pelvis.    Hct down , if  decreases further on repeat labs , PRBC to be given  Finger  with less discomfort, no drainage today unable to culture.  Minimal drainage after it was drained on  3/9, culture not sent during   BC & UA neg 3/9 neg to date  Driveline site  cx negative from 3/5  Tacro level 14.2, D/W hf , MAINTAIN CURRENT DOSAGE  Cont IV abx  Cont lovenox for dvt  3/12 Pt with decreased hct, 1uprbc being given.  Plastics f/u , would use silvadine  on finger, no further intervention at this time  3/13 The pt ambulated on stairs.  Prograf level 10  3/12/18 Rpt. duplex: [facility] Duplex Upper Ext Vein Scan, Bilat   Nonocclusive DVT of the R internal jugular vein. Persistent occlusive   DVT in the innominate and subclavian veins. Superficial thrombosis of the   R cephalic vein.  Persistent occlusive DVT L subclavian vein. Nonocclusive DVT of   the L internal jugular and innominate veins. Nonocclusive DVT of the   axillary and brachial vein surrounding a left PICC.    Studies reviewed w Dr Tavarez, will continue lvx 80 bid  3/14 Cardiac Biopsy, resume lovenox tonight  3/14 -   RLL collapse with persistent wbc , for bronch today  with Dr Sutherland, completed  Prograf 12, dose remains at 8mg bid  /72- 125/77  with Cardizem increased yesterday to 180mg daily  PT changed wound vac  3/15 Pt reports feeling better today. K+5.6 overnight, repeat 4.6 this AM, will cont to monitor.    AFB and cultures 3/14  results pending.   Prednisone taper 10 mg today. Midline d/c'd. PIV placed.   Prograf level 15.6 will discuss decrease in Prograf dose with Dr. Annabelle Mccabe  d/c planning anticipate Home Monday 3/19/18  3/16  tacro level 13.4 on decreased prograff dose of 7.  The Midline was d/c , PIV placed.  Prednisone was decreased  on 3/14.  The wbc is down today to 17.  To d/w ID the antibiotic regimen and question if they will be needed long term.  His creat was Up to1.86 today, d/w HF lasix 20 x 1 given,   3/17 Right bunion/blister lanced by podiatry 3/18 Renal consulted for NORMAN , PVR 0cc,  Urine cr, na, k, cl, and bun to calculate feurea,  Cardizem CD decrease to 120 tomorrow, mag oxide discontinued, decreased prograf 6mg bid starting tonight.

## 2018-03-18 NOTE — PROGRESS NOTE ADULT - SUBJECTIVE AND OBJECTIVE BOX
Podiatry pager #: [phone]    Patient is a 67y old  Male who presents with a chief complaint of Elevated LDH levels (01 Feb 2018 19:36)    INTERVAL HPI/OVERNIGHT EVENTS:  Patient seen and evaluated at bedside.  Pt is resting comfortable in NAD. Denies N/V/F/C.  Pt states no change in pain.     Allergies    No Known Allergies    Intolerances    Vital Signs Last 24 Hrs  T(C): 36.6 (18 Mar 2018 07:50), Max: 36.8 (17 Mar 2018 15:33)  T(F): 97.9 (18 Mar 2018 07:50), Max: 98.3 (17 Mar 2018 15:33)  HR: 107 (18 Mar 2018 07:50) (93 - 107)  BP: 115/64 (18 Mar 2018 07:50) (99/65 - 118/69)  BP(mean): 88 (18 Mar 2018 03:44) (88 - 88)  RR: 18 (18 Mar 2018 07:50) (18 - 18)  SpO2: 99% (18 Mar 2018 07:50) (96% - 99%)    LABS:                        8.6    15.6  )-----------( 445      ( 18 Mar 2018 07:23 )             26.6     03-18    138  |  106  |  51<H>  ----------------------------<  101<H>  4.9   |  20<L>  |  3.13<H>    Ca    9.4      18 Mar 2018 07:09    TPro  6.6  /  Alb  3.2<L>  /  TBili  0.5  /  DBili  x   /  AST  18  /  ALT  23  /  AlkPhos  97  03-18    CAPILLARY BLOOD GLUCOSE    POCT Blood Glucose.: 157 mg/dL (18 Mar 2018 11:36)  POCT Blood Glucose.: 113 mg/dL (18 Mar 2018 07:29)  POCT Blood Glucose.: 124 mg/dL (17 Mar 2018 21:49)  POCT Blood Glucose.: 118 mg/dL (17 Mar 2018 19:20)    Lower Extremity Physical Exam:  Vasular: DP/PT 2/4, B/L, CFT <3 seconds B/L, Temperature gradient WNL, B/L.   Neuro: Epicritic sensation intact to the level of the digits, B/L.  Musculoskeletal/Ortho: Severe HAV deformity, R>L. Painful to palpation to medial aspect of 1st MTPJ right foot. Minimal pain with 1st MTPJ range of motion. Palpable fluid filled like sac medial to 1st MTPJ.  Skin: Small area of hyperpigmentation medial 1st MTPJ, R foot. No erythema, no edema    RADIOLOGY & ADDITIONAL TESTS:  xray right foot showing gouty changes 1st MTPJ and medial 1st met head exostosis

## 2018-03-18 NOTE — PROGRESS NOTE ADULT - SUBJECTIVE AND OBJECTIVE BOX
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   Renal reconsulted for elevated cr. It seems cr has been stable at 0.9 - 1.1 but on 3/14 it was 1.2 and has been steadily rising since.   Renal called for evaluation. Vanc levels on 3/12 were 13 and then repeated level 3/16 was 32.7. relatively low bp was noted on 3/17 but not during the initial rise of cr.  Tacro levels also seem more elevated on 3/14 and onwards.       PAST HISTORY  --------------------------------------------------------------------------------  No significant changes to PMH, PSH, FHx, SHx, unless otherwise noted    ALLERGIES & MEDICATIONS  --------------------------------------------------------------------------------  Allergies    No Known Allergies    Intolerances      Standing Inpatient Medications  ALBUTerol/ipratropium for Nebulization 3 milliLiter(s) Nebulizer every 6 hours  aspirin enteric coated 81 milliGRAM(s) Oral daily  atovaquone Suspension 1500 milliGRAM(s) Oral daily  Calcium Citrate + Vit D, 315 mg/200 Unit 2 Tablet(s) 2 Tablet(s) Oral two times a day  diltiazem    milliGRAM(s) Oral daily  docusate sodium 100 milliGRAM(s) Oral three times a day  heparin  Injectable 16819 Unit(s) SubCutaneous two times a day  insulin lispro (HumaLOG) corrective regimen sliding scale   SubCutaneous three times a day before meals  insulin lispro (HumaLOG) corrective regimen sliding scale   SubCutaneous at bedtime  magnesium oxide 400 milliGRAM(s) Oral three times a day with meals  multivitamin 1 Tablet(s) Oral daily  mycophenolate mofetil 1000 milliGRAM(s) Oral <User Schedule>  nystatin    Suspension 430552 Unit(s) Oral every 6 hours  pantoprazole    Tablet 40 milliGRAM(s) Oral before breakfast  pravastatin 20 milliGRAM(s) Oral at bedtime  predniSONE   Tablet 10 milliGRAM(s) Oral every 12 hours  silver sulfADIAZINE 1% Cream 1 Application(s) Topical two times a day  tacrolimus 7 milliGRAM(s) Oral <User Schedule>  valGANciclovir 450 milliGRAM(s) Oral <User Schedule>    PRN Inpatient Medications  acetaminophen   Tablet. 650 milliGRAM(s) Oral every 6 hours PRN      REVIEW OF SYSTEMS  --------------------------------------------------------------------------------  Review Of Systems:  Constitutional: [ ] Fever [ ] Chills [ ] Fatigue [ ] Weight change   HEENT: [ ] Blurred vision [ ] Eye Pain [ ] Headache [ ] Runny nose [ ] Sore Throat   Respiratory: [ ] Cough [ ] Wheezing [ ] Shortness of breath  Cardiovascular: [ ] Chest Pain [ ] Palpitations [ ] LOBATO [ ] PND [ ] Orthopnea  Gastrointestinal: [ ] Abdominal Pain [ ] Diarrhea [ ] Constipation [ ] Hemorrhoids [ ] Nausea [ ] Vomiting  Genitourinary: [ ] Nocturia [ ] Dysuria [ ] Incontinence  Extremities: [ ] Swelling [ ] Joint Pain  Neurologic: [ ] Focal deficit [ ] Paresthesias [ ] Syncope  Lymphatic: [ ] Swelling [ ] Lymphadenopathy   Skin: [ ] Rash [ ] Ecchymoses [ ] Wounds [ ] Lesions  Psychiatry: [ ] Depression [ ] Suicidal/Homicidal Ideation [ ] Anxiety [ ] Sleep Disturbances  [x ] 10 point review of systems is otherwise negative except as mentioned above       [ ]Unable to obtain    All other systems were reviewed and are negative, except as noted.    VITALS/PHYSICAL EXAM  --------------------------------------------------------------------------------  T(C): 36.6 (18 @ 07:50), Max: 36.8 (18 @ 15:33)  HR: 107 (18 @ 07:50) (93 - 107)  BP: 115/64 (18 @ 07:50) (99/65 - 118/69)  RR: 18 (18 @ 07:50) (18 - 18)  SpO2: 99% (18 @ 07:50) (96% - 99%)  Wt(kg): --      Daily     Daily Weight in k (18 Mar 2018 06:44)  I&O's Summary    17 Mar 2018 07:01  -  18 Mar 2018 07:00  --------------------------------------------------------  IN: 960 mL / OUT: 1100 mL / NET: -140 mL    18 Mar 2018 07:01  -  18 Mar 2018 13:32  --------------------------------------------------------  IN: 360 mL / OUT: 200 mL / NET: 160 mL          18 @ 07:01  -  18 @ 07:00  --------------------------------------------------------  IN: 960 mL / OUT: 1100 mL / NET: -140 mL    18 @ 07:01  -  18 @ 13:32  --------------------------------------------------------  IN: 360 mL / OUT: 200 mL / NET: 160 mL        Physical Exam:              Gen: NAD   	HEENT: anicteric  	Pulm: CTA B/L   	CV: RRR  	Back: No dependent edema  	Abd: soft, nontender, nondistended  	: No emily  	LE: Warm, no edema  	Neuro: no asterixis  	Skin: Warm, without rashes  	Vascular access: none    LABS/STUDIES  --------------------------------------------------------------------------------              8.6    15.6  >-----------<  445      [18 @ 07:23]              26.6     138  |  106  |  51  ----------------------------<  101      [03-18-18 @ 07:09]  4.9   |  20  |  3.13        Ca     9.4     [18 07:09]    TPro  6.6  /  Alb  3.2  /  TBili  0.5  /  DBili  x   /  AST  18  /  ALT  23  /  AlkPhos  97  [18 07:09]      Uric acid 7.5      [18 07:09]    Creatinine Trend:  SCr 3.13 [:09]  SCr 2.44 [ 07:15]  SCr 2.15 [ 19:10]  SCr 1.86 [:18]  SCr 1.41 [03-15 @ 07:18]    Urinalysis - [03-10-18 @ 11:56]      Color Yellow / Appearance Clear / SG 1.015 / pH 6.0      Gluc Negative / Ketone Negative  / Bili Negative / Urobili Negative       Blood Trace / Protein 30 / Leuk Est Negative / Nitrite Negative      RBC 0-2 / WBC  / Hyaline  / Gran  / Sq Epi  / Non Sq Epi  / Bacteria       Iron 22, TIBC 182, %sat 12      [18 12:44]  Ferritin 79.0      [18 12:44]  HbA1c 5.3      [18 11:20]  TSH 1.48      [18 08:13]  Lipid: chol 62, TG 33, HDL 17, LDL 38      [17 @ 06:14]          Radiology  --------------------------------------------------------------------------------    --------------------------------------------------------------------------------  Jay [id number] [phone]

## 2018-03-18 NOTE — CHART NOTE - NSCHARTNOTEFT_GEN_A_CORE
Source: Patient [ x ]    Family [ ]     other [ x ] RN, Comprehensive chart review     Pt seen for nutrition follow up. Reports variable appetite and eating about 50% of meals. Pt states he is drinking 1 Ensure Enlive daily, encouraged pt to consume at least 2 daily now that PO intake of meals has decreased. Obtained food preferences from pt, and will provide as able. Assisted pt with dinner selections for today. Pt denied GI distress with BM today.     Pt able to teach-back food drug interactions, and receptive to review of avoiding pomegranate, grapefruit, and sour oranges. Pt also able to teach-back heart healthy diet and food safety for transplant recipients. Pt continues with inability to teach-back high potassium foods but receptive to review and reinforcement.     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 Noted per ID pt with right lower lobe atelectasis vs early PNA; s/p bronchoscopy- no mucus plug and atelectasis improving. Noted s/p 3 endomyocardial biopsies. NORMAN likely related to the Vancomycin dosing, which has since stopped.    Diet : regular, fiber/residue restricted, no concentrated potassium, Ensure Enlive 3 cans/day       Admission Weight: 78.9kg  Current Weight: 74kg  Weight fluctuations noted, likely due to fluid shifts. Pt with 1+right arm edema.    Pertinent Medications: MEDICATIONS  (STANDING):  ALBUTerol/ipratropium for Nebulization 3 milliLiter(s) Nebulizer every 6 hours  aspirin enteric coated 81 milliGRAM(s) Oral daily  atovaquone Suspension 1500 milliGRAM(s) Oral daily  Calcium Citrate + Vit D, 315 mg/200 Unit 2 Tablet(s) 2 Tablet(s) Oral two times a day  docusate sodium 100 milliGRAM(s) Oral three times a day  heparin  Injectable 69034 Unit(s) SubCutaneous two times a day  insulin lispro (HumaLOG) corrective regimen sliding scale   SubCutaneous three times a day before meals  insulin lispro (HumaLOG) corrective regimen sliding scale   SubCutaneous at bedtime  multivitamin 1 Tablet(s) Oral daily  mycophenolate mofetil 1000 milliGRAM(s) Oral <User Schedule>  nystatin    Suspension 652953 Unit(s) Oral every 6 hours  pantoprazole    Tablet 40 milliGRAM(s) Oral before breakfast  pravastatin 20 milliGRAM(s) Oral at bedtime  predniSONE   Tablet 10 milliGRAM(s) Oral every 12 hours  silver sulfADIAZINE 1% Cream 1 Application(s) Topical two times a day  tacrolimus 6 milliGRAM(s) Oral <User Schedule>  valGANciclovir 450 milliGRAM(s) Oral <User Schedule>    MEDICATIONS  (PRN):  acetaminophen   Tablet. 650 milliGRAM(s) Oral every 6 hours PRN Mild Pain (1 - 3)    Pertinent Labs:    Complete Blood Count (03.18.18 @ 07:23)    Hemoglobin: 8.6 g/dL - low    Hematocrit: 26.6 % - low  Comprehensive Metabolic Panel (03.18.18 @ 07:09)    Sodium, Serum: 138 mmol/L    Potassium, Serum: 4.9 mmol/L    Chloride, Serum: 106 mmol/L    Carbon Dioxide, Serum: 20 mmol/L - low    Anion Gap, Serum: 12 mmol/L    Blood Urea Nitrogen, Serum: 51 mg/dL - high    Creatinine, Serum: 3.13 mg/dL - high    Glucose, Serum: 101 mg/dL - high    Calcium, Total Serum: 9.4 mg/dL    Protein Total, Serum: 6.6 g/dL    Albumin, Serum: 3.2 g/dL - low    Bilirubin Total, Serum: 0.5 mg/dL    Alkaline Phosphatase, Serum: 97 U/L    Aspartate Aminotransferase (AST/SGOT): 18 U/L    Alanine Aminotransferase (ALT/SGPT): 23 U/L RC  C-Reactive Protein, Serum (03.18.18 @ 09:13)    C-Reactive Protein, Serum: 3.10 mg/dL - high  Hemoglobin A1C, Whole Blood (03.18.18 @ 11:20)    Hemoglobin A1C, Whole Blood: 5.3%      Point of Care Testing BG: (3/18)157,113, (3/17)    Skin: No pressure ulcers    Estimated Needs:   [ x ] no change since previous assessment  [ ] recalculated:       Previous Nutrition Diagnosis:   Limited adherence to nutrition related recommendations being addressed with continued diet education.   Inadequate oral intake and Increased Nutrient Needs being addressed with PO diet and supplements.      New Nutrition Diagnosis: [ x ] not applicable      Interventions:   1. Encourage PO intake with all meals and supplements.   2. Provide food preferences within therapeutic diet when requested.   3. Reviewed/reinforced food safety, drug/nutrient interactions and potassium content of foods.  4. Pt made aware RD remains available as needed.         Monitoring and Evaluation:     [ x ] PO intake [ x ] Tolerance to diet prescription [ x ] weights [ x ] follow up per protocol    [ ] other:

## 2018-03-18 NOTE — PROGRESS NOTE ADULT - ATTENDING COMMENTS
Patient with NORMAN, non oliguric . Multiple NORMAN  risks - hgih Vanco level, infection, Tac level, hemodynamic changes. Current management will include :  Monitoring chemistry including phosphorus  Monitoring of I/O and maintaining euvolemic state  Avoiding nephrotoxic agents-NSAIDs, contrast, nephrotoxic antibiotics  Adjusting dosage of medications for abnormal creatinine clearance (<10ml/min when creatinine is rising)  Reviewed workup available. Other work up as suggested in consult-urinalysis, urine lytes, urine eos;  renal imaging by sono, post void bladder volume  Please call with any additional questions or on availability of any new information or reports or change in clinical condition. Will follow.

## 2018-03-18 NOTE — PROGRESS NOTE ADULT - ASSESSMENT
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   Renal reconsulted for elevated cr. It seems cr has been stable at 0.9 - 1.1 but on 3/14 it was 1.2 and has been steadily rising since.   Renal called for evaluation. Vanc levels on 3/12 were 13 and then repeated level 3/16 was 32.7. relatively low bp was noted on 3/17 but not during the initial rise of cr.  Tacro levels also seem more elevated on 3/14 and onwards.

## 2018-03-18 NOTE — PROGRESS NOTE ADULT - SUBJECTIVE AND OBJECTIVE BOX
PAGER:  [phone]               [facility] [id number]              EMAIL [email or web address]   OFFICE [phone]                              ********VASCULAR MEDICINE PROGRESS NOTE********                            CC:  UEDVT    INTERVAL HISTORY:  Creatinine continues to uptrend - now seen by Renal.   No CP or SOB.  Sitting in chair. R arm without symptoms.  Pod saw patient yesterday.           Allergies    No Known Allergies    Intolerances  MEDICATIONS  (STANDING):  ALBUTerol/ipratropium for Nebulization 3 milliLiter(s) Nebulizer every 6 hours  aspirin enteric coated 81 milliGRAM(s) Oral daily  atovaquone Suspension 1500 milliGRAM(s) Oral daily  Calcium Citrate + Vit D, 315 mg/200 Unit 2 Tablet(s) 2 Tablet(s) Oral two times a day  diltiazem    milliGRAM(s) Oral daily  docusate sodium 100 milliGRAM(s) Oral three times a day  heparin  Injectable 42634 Unit(s) SubCutaneous two times a day  insulin lispro (HumaLOG) corrective regimen sliding scale   SubCutaneous three times a day before meals  insulin lispro (HumaLOG) corrective regimen sliding scale   SubCutaneous at bedtime  magnesium oxide 400 milliGRAM(s) Oral three times a day with meals  multivitamin 1 Tablet(s) Oral daily  mycophenolate mofetil 1000 milliGRAM(s) Oral <User Schedule>  nystatin    Suspension 245570 Unit(s) Oral every 6 hours  pantoprazole    Tablet 40 milliGRAM(s) Oral before breakfast  pravastatin 20 milliGRAM(s) Oral at bedtime  predniSONE   Tablet 10 milliGRAM(s) Oral every 12 hours  silver sulfADIAZINE 1% Cream 1 Application(s) Topical two times a day  tacrolimus 7 milliGRAM(s) Oral <User Schedule>  valGANciclovir 450 milliGRAM(s) Oral <User Schedule>    MEDICATIONS  (PRN):  acetaminophen   Tablet. 650 milliGRAM(s) Oral every 6 hours PRN Mild Pain (1 - 3)    PAST MEDICAL & SURGICAL HISTORY:  GIB (gastrointestinal bleeding)  Ventricular fibrillation: s/p AICD  PAF (paroxysmal atrial fibrillation): on xarelto  Non-Ischemic Cardiomyopathy  SVT (Supraventricular Tachycardia)  HTN  CHF (Congestive Heart Failure)  LVAD (left ventricular assist device) present  Status post left hip replacement  History of Prior Ablation Treatment: for afib  AICD (Automatic Cardioverter/Defibrillator) Present: [employer] with 1 [employer] lead4/1/09- explanted and replaced with [employer] 2 leads on 9/2/09      FAMILY HISTORY:  No pertinent family history in first degree relatives      SOCIAL HISTORY:  unchanged    REVIEW OF SYSTEMS:  CONSTITUTIONAL: No fever, weight loss, or fatigue  EYES: No eye pain, visual disturbances, or discharge  ENMT:  No difficulty hearing, tinnitus, vertigo; No sinus or throat pain  NECK: No pain or stiffness  RESPIRATORY: No cough, wheezing, chills or hemoptysis; No Shortness of Breath  CARDIOVASCULAR: No chest pain, palpitations, passing out, dizziness, or leg swelling  GASTROINTESTINAL: No abdominal or epigastric pain. No nausea, vomiting, or hematemesis; No diarrhea or constipation. No melena or hematochezia.  GENITOURINARY: No dysuria, frequency, hematuria, or incontinence  NEUROLOGICAL: No headaches, memory loss, loss of strength, numbness, or tremors  SKIN: No itching, burning, rashes, or lesions   LYMPH Nodes: No enlarged glands  ENDOCRINE: No heat or cold intolerance; No hair loss  MUSCULOSKELETAL: No joint pain or swelling; No muscle, back, or extremity pain  PSYCHIATRIC: No depression, anxiety, mood swings, or difficulty sleeping  HEME/LYMPH: No easy bruising, or bleeding gums  ALLERY AND IMMUNOLOGIC: No hives or eczema	    [x ] All others negative	  [ ] Unable to obtain    ICU Vital Signs Last 24 Hrs  T(C): 36.6 (18 Mar 2018 07:50), Max: 36.8 (17 Mar 2018 15:33)  T(F): 97.9 (18 Mar 2018 07:50), Max: 98.3 (17 Mar 2018 15:33)  HR: 107 (18 Mar 2018 07:50) (93 - 107)  BP: 115/64 (18 Mar 2018 07:50) (99/65 - 118/69)  BP(mean): 88 (18 Mar 2018 03:44) (88 - 88)  ABP: --  ABP(mean): --  RR: 18 (18 Mar 2018 07:50) (18 - 18)  SpO2: 99% (18 Mar 2018 07:50) (96% - 99%)    Appearance: Normal	  HEENT:   Normal oral mucosa, PERRL, EOMI	  Lymphatic: No lymphadenopathy  Cardiovascular: Normal S1 S2   Respiratory: Lungs clear to auscultation	  Psychiatry: A & O x 3, Mood & affect appropriate  Gastrointestinal:  Soft, Non-tender, + BS	  Skin: No rashes, No ecchymoses, No cyanosis	  Neurologic: Non-focal  Extremities:  Right upper extremity bicep area fullness and edema.  Forearm without edema.  Hands are warm.  R 3rd digit is wrapped.    Right bunion waterblister.                           8.6    15.6  )-----------( 445      ( 18 Mar 2018 07:23 )             26.6   03-18    138  |  106  |  51<H>  ----------------------------<  101<H>  4.9   |  20<L>  |  3.13<H>    Ca    9.4      18 Mar 2018 07:09    TPro  6.6  /  Alb  3.2<L>  /  TBili  0.5  /  DBili  x   /  AST  18  /  ALT  23  /  AlkPhos  97  03-18

## 2018-03-18 NOTE — PROGRESS NOTE ADULT - SUBJECTIVE AND OBJECTIVE BOX
Interval History:    Medications:  acetaminophen   Tablet. 650 milliGRAM(s) Oral every 6 hours PRN  ALBUTerol/ipratropium for Nebulization 3 milliLiter(s) Nebulizer every 6 hours  aspirin enteric coated 81 milliGRAM(s) Oral daily  atovaquone Suspension 1500 milliGRAM(s) Oral daily  Calcium Citrate + Vit D, 315 mg/200 Unit 2 Tablet(s) 2 Tablet(s) Oral two times a day  diltiazem    milliGRAM(s) Oral daily  docusate sodium 100 milliGRAM(s) Oral three times a day  heparin  Injectable 61550 Unit(s) SubCutaneous two times a day  insulin lispro (HumaLOG) corrective regimen sliding scale   SubCutaneous three times a day before meals  insulin lispro (HumaLOG) corrective regimen sliding scale   SubCutaneous at bedtime  magnesium oxide 400 milliGRAM(s) Oral three times a day with meals  multivitamin 1 Tablet(s) Oral daily  mycophenolate mofetil 1000 milliGRAM(s) Oral <User Schedule>  nystatin    Suspension 244711 Unit(s) Oral every 6 hours  pantoprazole    Tablet 40 milliGRAM(s) Oral before breakfast  pravastatin 20 milliGRAM(s) Oral at bedtime  predniSONE   Tablet 10 milliGRAM(s) Oral every 12 hours  silver sulfADIAZINE 1% Cream 1 Application(s) Topical two times a day  tacrolimus 7 milliGRAM(s) Oral <User Schedule>  valGANciclovir 450 milliGRAM(s) Oral <User Schedule>    Vitals:  T(C): 36.4 (18 @ 12:00), Max: 36.8 (18 @ 15:33)  HR: 100 (18 @ 12:00) (93 - 107)  BP: 97/54 (18 @ 12:00) (97/54 - 118/69)  BP(mean): 88 (18 @ 03:44) (88 - 88)  RR: 18 (18 @ 12:00) (18 - 18)  SpO2: 95% (18 @ 12:00) (95% - 99%)      Daily Weight in k (18 Mar 2018 06:44)    I&O's Summary    17 Mar 2018 07:01  -  18 Mar 2018 07:00  --------------------------------------------------------  IN: 960 mL / OUT: 1100 mL / NET: -140 mL    18 Mar 2018 07:01  -  18 Mar 2018 14:30  --------------------------------------------------------  IN: 720 mL / OUT: 420 mL / NET: 300 mL      Physical Exam:  Appearance: No Acute Distress  HEENT: JVP ___ cm H2O, no HJR  Cardiovascular: RRR, Normal S1 S2, No murmurs/rubs/gallops  Respiratory: Clear to auscultation bilaterally  Gastrointestinal: Soft, Non-tender, non-distended	  Skin: no skin lesions  Neurologic: Non-focal  Extremities: No LE edema, warm and well perfused  Psychiatry: A & O x 3, Mood & affect appropriate      Labs:                        8.6    15.6  )-----------( 445      ( 18 Mar 2018 07:23 )             26.6     03-18    138  |  106  |  51<H>  ----------------------------<  101<H>  4.9   |  20<L>  |  3.13<H>    Ca    9.4      18 Mar 2018 07:09    TPro  6.6  /  Alb  3.2<L>  /  TBili  0.5  /  DBili  x   /  AST  18  /  ALT  23  /  AlkPhos  97  -18      TELEMETRY: Interval History: no overnight events. He is feeling well and is without complaints. Breathing has been better over the last few days and he is able to walk more than before. Had a blister on his right great toe bunion that was lanced and bled a bit, but is now feeling better. A special boot was ordered for him to help cushion the area.    Medications:  acetaminophen   Tablet. 650 milliGRAM(s) Oral every 6 hours PRN  ALBUTerol/ipratropium for Nebulization 3 milliLiter(s) Nebulizer every 6 hours  aspirin enteric coated 81 milliGRAM(s) Oral daily  atovaquone Suspension 1500 milliGRAM(s) Oral daily  Calcium Citrate + Vit D, 315 mg/200 Unit 2 Tablet(s) 2 Tablet(s) Oral two times a day  diltiazem    milliGRAM(s) Oral daily  docusate sodium 100 milliGRAM(s) Oral three times a day  heparin  Injectable 76376 Unit(s) SubCutaneous two times a day  insulin lispro (HumaLOG) corrective regimen sliding scale   SubCutaneous three times a day before meals  insulin lispro (HumaLOG) corrective regimen sliding scale   SubCutaneous at bedtime  magnesium oxide 400 milliGRAM(s) Oral three times a day with meals  multivitamin 1 Tablet(s) Oral daily  mycophenolate mofetil 1000 milliGRAM(s) Oral <User Schedule>  nystatin    Suspension 464919 Unit(s) Oral every 6 hours  pantoprazole    Tablet 40 milliGRAM(s) Oral before breakfast  pravastatin 20 milliGRAM(s) Oral at bedtime  predniSONE   Tablet 10 milliGRAM(s) Oral every 12 hours  silver sulfADIAZINE 1% Cream 1 Application(s) Topical two times a day  tacrolimus 7 milliGRAM(s) Oral <User Schedule>  valGANciclovir 450 milliGRAM(s) Oral <User Schedule>    Vitals:  T(C): 36.4 (18 @ 12:00), Max: 36.8 (18 @ 15:33)  HR: 100 (18 @ 12:00) (93 - 107)  BP: 97/54 (18 @ 12:00) (97/54 - 118/69)  BP(mean): 88 (18 @ 03:44) (88 - 88)  RR: 18 (18 @ 12:00) ()  SpO2: 95% (18 @ 12:00) (95% - 99%)      Daily Weight in k (18 Mar 2018 06:44)    I&O's Summary    17 Mar 2018 07:  -  18 Mar 2018 07:00  --------------------------------------------------------  IN: 960 mL / OUT: 1100 mL / NET: -140 mL    18 Mar 2018 07:01  -  18 Mar 2018 14:30  --------------------------------------------------------  IN: 720 mL / OUT: 420 mL / NET: 300 mL      Physical Exam:  Appearance: No Acute Distress  HEENT: supple, no masses  Cardiovascular: tachy, but regular rhythm, Normal S1 S2, No murmurs/rubs/gallops  Respiratory: Clear to auscultation bilaterally  Gastrointestinal: Soft, Non-tender, non-distended	  Skin: no skin lesions  Neurologic: Non-focal  Extremities: No LE edema, warm and well perfused  Psychiatry: A & O x 3, Mood & affect appropriate      Labs:                        8.6    15.6  )-----------( 445      ( 18 Mar 2018 07:23 )             26.6     18    138  |  106  |  51<H>  ----------------------------<  101<H>  4.9   |  20<L>  |  3.13<H>    Ca    9.4      18 Mar 2018 07:09    TPro  6.6  /  Alb  3.2<L>  /  TBili  0.5  /  DBili  x   /  AST  18  /  ALT  23  /  AlkPhos  97        TELEMETRY: no events

## 2018-03-18 NOTE — PROGRESS NOTE ADULT - ASSESSMENT
Assessment:  1.  Bilateral, extensive upper extremity DVT  - Currently on lovenox  - Swelling of RUE controlled with elevation and ace wrap  2.  Heart Transplant  3.  Acute renal failure  4.  Right 3rf digit ischemia - stable  5.  Right foot bunion      Plan  1.  Continue with heparin 250 units per Kilogram every 12 hours.   2.  Please check PTT 6 hours after next heparin injection  3.  Appreciate podiatry followup of L foot  4.  His R upper arm feels firm, but is compressible and without symptoms.  Lets repeat an upper extremity venous duplex tomorrow (bilateral)    [place] [id number]

## 2018-03-19 LAB
ALBUMIN SERPL ELPH-MCNC: 2.9 G/DL — LOW (ref 3.3–5)
ALP SERPL-CCNC: 100 U/L — SIGNIFICANT CHANGE UP (ref 40–120)
ALT FLD-CCNC: 21 U/L RC — SIGNIFICANT CHANGE UP (ref 10–45)
ANION GAP SERPL CALC-SCNC: 11 MMOL/L — SIGNIFICANT CHANGE UP (ref 5–17)
APTT BLD: 79.1 SEC — HIGH (ref 27.5–37.4)
APTT BLD: 93.2 SEC — HIGH (ref 27.5–37.4)
AST SERPL-CCNC: 17 U/L — SIGNIFICANT CHANGE UP (ref 10–40)
BILIRUB SERPL-MCNC: 0.4 MG/DL — SIGNIFICANT CHANGE UP (ref 0.2–1.2)
BUN SERPL-MCNC: 61 MG/DL — HIGH (ref 7–23)
C3 SERPL-MCNC: 135 MG/DL — SIGNIFICANT CHANGE UP (ref 80–180)
C4 SERPL-MCNC: 37 MG/DL — SIGNIFICANT CHANGE UP (ref 10–45)
CALCIUM SERPL-MCNC: 8.7 MG/DL — SIGNIFICANT CHANGE UP (ref 8.4–10.5)
CHLORIDE SERPL-SCNC: 104 MMOL/L — SIGNIFICANT CHANGE UP (ref 96–108)
CO2 SERPL-SCNC: 21 MMOL/L — LOW (ref 22–31)
CREAT ?TM UR-MCNC: 101 MG/DL — SIGNIFICANT CHANGE UP
CREAT SERPL-MCNC: 3.61 MG/DL — HIGH (ref 0.5–1.3)
GLUCOSE BLDC GLUCOMTR-MCNC: 103 MG/DL — HIGH (ref 70–99)
GLUCOSE BLDC GLUCOMTR-MCNC: 115 MG/DL — HIGH (ref 70–99)
GLUCOSE BLDC GLUCOMTR-MCNC: 138 MG/DL — HIGH (ref 70–99)
GLUCOSE BLDC GLUCOMTR-MCNC: 94 MG/DL — SIGNIFICANT CHANGE UP (ref 70–99)
GLUCOSE SERPL-MCNC: 114 MG/DL — HIGH (ref 70–99)
HCT VFR BLD CALC: 24.9 % — LOW (ref 39–50)
HGB BLD-MCNC: 8.1 G/DL — LOW (ref 13–17)
KAPPA LC SER QL IFE: 4.36 MG/DL — HIGH (ref 0.33–1.94)
KAPPA/LAMBDA FREE LIGHT CHAIN RATIO, SERUM: 0.76 RATIO — SIGNIFICANT CHANGE UP (ref 0.26–1.65)
LAMBDA LC SER QL IFE: 5.71 MG/DL — HIGH (ref 0.57–2.63)
MAGNESIUM SERPL-MCNC: 2.3 MG/DL — SIGNIFICANT CHANGE UP (ref 1.6–2.6)
MCHC RBC-ENTMCNC: 30 PG — SIGNIFICANT CHANGE UP (ref 27–34)
MCHC RBC-ENTMCNC: 32.4 GM/DL — SIGNIFICANT CHANGE UP (ref 32–36)
MCV RBC AUTO: 92.4 FL — SIGNIFICANT CHANGE UP (ref 80–100)
PLATELET # BLD AUTO: 461 K/UL — HIGH (ref 150–400)
POTASSIUM SERPL-MCNC: 4.9 MMOL/L — SIGNIFICANT CHANGE UP (ref 3.5–5.3)
POTASSIUM SERPL-SCNC: 4.9 MMOL/L — SIGNIFICANT CHANGE UP (ref 3.5–5.3)
PROT ?TM UR-MCNC: 89 MG/DL — HIGH (ref 0–12)
PROT SERPL-MCNC: 6.4 G/DL — SIGNIFICANT CHANGE UP (ref 6–8.3)
PROT/CREAT UR-RTO: 0.9 RATIO — HIGH (ref 0–0.2)
RBC # BLD: 2.69 M/UL — LOW (ref 4.2–5.8)
RBC # FLD: 16.5 % — HIGH (ref 10.3–14.5)
SODIUM SERPL-SCNC: 136 MMOL/L — SIGNIFICANT CHANGE UP (ref 135–145)
TACROLIMUS SERPL-MCNC: 13.2 NG/ML — SIGNIFICANT CHANGE UP
WBC # BLD: 15.4 K/UL — HIGH (ref 3.8–10.5)
WBC # FLD AUTO: 15.4 K/UL — HIGH (ref 3.8–10.5)

## 2018-03-19 PROCEDURE — 99233 SBSQ HOSP IP/OBS HIGH 50: CPT | Mod: GC

## 2018-03-19 PROCEDURE — 99233 SBSQ HOSP IP/OBS HIGH 50: CPT

## 2018-03-19 PROCEDURE — 71045 X-RAY EXAM CHEST 1 VIEW: CPT | Mod: 26

## 2018-03-19 PROCEDURE — 90834 PSYTX W PT 45 MINUTES: CPT

## 2018-03-19 PROCEDURE — 93970 EXTREMITY STUDY: CPT | Mod: 26

## 2018-03-19 PROCEDURE — 93975 VASCULAR STUDY: CPT | Mod: 26

## 2018-03-19 PROCEDURE — 99232 SBSQ HOSP IP/OBS MODERATE 35: CPT

## 2018-03-19 PROCEDURE — 93306 TTE W/DOPPLER COMPLETE: CPT | Mod: 26

## 2018-03-19 RX ORDER — TACROLIMUS 5 MG/1
7 CAPSULE ORAL
Qty: 0 | Refills: 0 | Status: DISCONTINUED | OUTPATIENT
Start: 2018-03-19 | End: 2018-03-20

## 2018-03-19 RX ORDER — TACROLIMUS 5 MG/1
6 CAPSULE ORAL
Qty: 0 | Refills: 0 | Status: DISCONTINUED | OUTPATIENT
Start: 2018-03-19 | End: 2018-03-20

## 2018-03-19 RX ADMIN — Medication 500000 UNIT(S): at 23:13

## 2018-03-19 RX ADMIN — Medication 500000 UNIT(S): at 12:03

## 2018-03-19 RX ADMIN — Medication 81 MILLIGRAM(S): at 12:04

## 2018-03-19 RX ADMIN — PANTOPRAZOLE SODIUM 40 MILLIGRAM(S): 20 TABLET, DELAYED RELEASE ORAL at 05:55

## 2018-03-19 RX ADMIN — Medication 3 MILLILITER(S): at 12:01

## 2018-03-19 RX ADMIN — Medication 1 TABLET(S): at 12:04

## 2018-03-19 RX ADMIN — Medication 500000 UNIT(S): at 18:55

## 2018-03-19 RX ADMIN — Medication 10 MILLIGRAM(S): at 18:55

## 2018-03-19 RX ADMIN — Medication 3 MILLILITER(S): at 05:54

## 2018-03-19 RX ADMIN — Medication 1 APPLICATION(S): at 05:55

## 2018-03-19 RX ADMIN — Medication 650 MILLIGRAM(S): at 22:37

## 2018-03-19 RX ADMIN — Medication 10 MILLIGRAM(S): at 05:55

## 2018-03-19 RX ADMIN — MYCOPHENOLATE MOFETIL 1000 MILLIGRAM(S): 250 CAPSULE ORAL at 20:00

## 2018-03-19 RX ADMIN — Medication 1 APPLICATION(S): at 18:55

## 2018-03-19 RX ADMIN — Medication 500000 UNIT(S): at 05:55

## 2018-03-19 RX ADMIN — TACROLIMUS 6 MILLIGRAM(S): 5 CAPSULE ORAL at 20:01

## 2018-03-19 RX ADMIN — Medication 20 MILLIGRAM(S): at 21:52

## 2018-03-19 RX ADMIN — Medication 120 MILLIGRAM(S): at 05:55

## 2018-03-19 RX ADMIN — VALGANCICLOVIR 450 MILLIGRAM(S): 450 TABLET, FILM COATED ORAL at 05:54

## 2018-03-19 RX ADMIN — Medication 3 MILLILITER(S): at 23:13

## 2018-03-19 RX ADMIN — Medication 650 MILLIGRAM(S): at 21:52

## 2018-03-19 RX ADMIN — HEPARIN SODIUM 18500 UNIT(S): 5000 INJECTION INTRAVENOUS; SUBCUTANEOUS at 05:54

## 2018-03-19 RX ADMIN — TACROLIMUS 7 MILLIGRAM(S): 5 CAPSULE ORAL at 08:00

## 2018-03-19 RX ADMIN — Medication 3 MILLILITER(S): at 18:55

## 2018-03-19 RX ADMIN — MYCOPHENOLATE MOFETIL 1000 MILLIGRAM(S): 250 CAPSULE ORAL at 08:00

## 2018-03-19 RX ADMIN — ATOVAQUONE 1500 MILLIGRAM(S): 750 SUSPENSION ORAL at 12:04

## 2018-03-19 NOTE — PROGRESS NOTE ADULT - PROBLEM SELECTOR PLAN 1
As stated above for the ddx.  Checking renal sonogram with PVR, but add renal vein dopplers to rule out clot  Ordered urine for p/crt ratio, free light chains and cryo crit with c3,c4  Optimizing right sided function of the heart might also aid in the improvement of NORMAN  Tacro levels are fluctuating and will monitor for now and if possible to keep 10-12 range   Vanco by level only and can lead to cast nephropathy if levels are high in such cases.  If possible, dc PPI and use H2 blocker but if strong indication- continue PPI

## 2018-03-19 NOTE — PROGRESS NOTE ADULT - SUBJECTIVE AND OBJECTIVE BOX
[facility] DIVISION OF KIDNEY DISEASES AND HYPERTENSION -- FOLLOW UP NOTE  --------------------------------------------------------------------------------  Chief Complaint:  sitting in bed, no specific complaints    24 hour events/subjective:        PAST HISTORY  --------------------------------------------------------------------------------  No significant changes to PMH, PSH, FHx, SHx, unless otherwise noted    ALLERGIES & MEDICATIONS  --------------------------------------------------------------------------------  Allergies    No Known Allergies    Intolerances      Standing Inpatient Medications  ALBUTerol/ipratropium for Nebulization 3 milliLiter(s) Nebulizer every 6 hours  aspirin enteric coated 81 milliGRAM(s) Oral daily  atovaquone Suspension 1500 milliGRAM(s) Oral daily  Calcium Citrate + Vit D, 315 mg/200 Unit 2 Tablet(s) 2 Tablet(s) Oral two times a day  diltiazem    milliGRAM(s) Oral daily  docusate sodium 100 milliGRAM(s) Oral three times a day  heparin  Injectable 50828 Unit(s) SubCutaneous two times a day  insulin lispro (HumaLOG) corrective regimen sliding scale   SubCutaneous three times a day before meals  insulin lispro (HumaLOG) corrective regimen sliding scale   SubCutaneous at bedtime  multivitamin 1 Tablet(s) Oral daily  mycophenolate mofetil 1000 milliGRAM(s) Oral <User Schedule>  nystatin    Suspension 410836 Unit(s) Oral every 6 hours  pantoprazole    Tablet 40 milliGRAM(s) Oral before breakfast  pravastatin 20 milliGRAM(s) Oral at bedtime  predniSONE   Tablet 10 milliGRAM(s) Oral every 12 hours  silver sulfADIAZINE 1% Cream 1 Application(s) Topical two times a day  tacrolimus 7 milliGRAM(s) Oral <User Schedule>  valGANciclovir 450 milliGRAM(s) Oral <User Schedule>    PRN Inpatient Medications  acetaminophen   Tablet. 650 milliGRAM(s) Oral every 6 hours PRN      REVIEW OF SYSTEMS  --------------------------------------------------------------------------------  Gen: No weight changes, fatigue, fevers/chills, weakness  Skin: No rashes  Head/Eyes/Ears/Mouth: No headache; Normal hearing; Normal vision w/o blurriness; No sinus pain/discomfort, sore throat  Respiratory: No dyspnea, cough, wheezing, hemoptysis  CV: No chest pain, PND, orthopnea  : No increased frequency, dysuria, hematuria, nocturia  MSK: No joint pain/swelling; no back pain; no edema  Neuro: No dizziness/lightheadedness, weakness, seizures, numbness, tingling  Heme: No easy bruising or bleeding  Endo: No heat/cold intolerance  Psych: No significant nervousness, anxiety, stress, depression    All other systems were reviewed and are negative, except as noted.    VITALS/PHYSICAL EXAM  --------------------------------------------------------------------------------  T(C): 35.8 (03-19-18 @ 07:53), Max: 36.9 (03-18-18 @ 19:40)  HR: 100 (03-19-18 @ 07:53) (97 - 104)  BP: 106/74 (03-19-18 @ 07:53) (97/54 - 112/75)  RR: 18 (03-19-18 @ 07:53) (18 - 18)  SpO2: 99% (03-19-18 @ 07:53) (95% - 100%)  Wt(kg): --        03-18-18 @ 07:01  -  03-19-18 @ 07:00  --------------------------------------------------------  IN: 960 mL / OUT: 770 mL / NET: 190 mL    03-19-18 @ 07:01  -  03-19-18 @ 11:13  --------------------------------------------------------  IN: 360 mL / OUT: 0 mL / NET: 360 mL      Physical Exam:  	Gen: NAD, well-appearing  	HEENT: PERRL, supple neck, clear oropharynx  	Pulm: CTA B/L  	CV: RRR, S1S2; no rub  	Back: No spinal or CVA tenderness; no sacral edema  	Abd: +BS, soft, nontender/nondistended  	Skin: Warm, without rashes  	LE: no edema    LABS/STUDIES  --------------------------------------------------------------------------------              8.1    15.4  >-----------<  461      [03-19-18 @ 00:14]              24.9     136  |  104  |  61  ----------------------------<  114      [03-19-18 @ 00:14]  4.9   |  21  |  3.61        Ca     8.7     [03-19-18 @ 00:14]      Mg     2.3     [03-19-18 @ 00:14]    TPro  6.4  /  Alb  2.9  /  TBili  0.4  /  DBili  x   /  AST  17  /  ALT  21  /  AlkPhos  100  [03-19-18 @ 00:14]      PTT: 79.1       [03-19-18 @ 00:14]    Uric acid 7.5      [03-18-18 @ 07:09]    Creatinine Trend:  SCr 3.61 [03-19 @ 00:14]  SCr 3.13 [03-18 @ 07:09]  SCr 2.44 [03-17 @ 07:15]  SCr 2.15 [03-16 @ 19:10]  SCr 1.86 [03-16 @ 07:18]    Urinalysis - [03-18-18 @ 15:52]      Color Yellow / Appearance SL Turbid / SG 1.018 / pH 6.0      Gluc Negative / Ketone Negative  / Bili Negative / Urobili Negative       Blood Small / Protein 100 / Leuk Est Negative / Nitrite Negative      RBC 0-2 / WBC 0-2 / Hyaline 2-5 / Gran  / Sq Epi  / Non Sq Epi  / Bacteria Few    Urine Creatinine 133      [03-18-18 @ 15:52]  Urine Sodium 35      [03-18-18 @ 15:52]  Urine Urea Nitrogen 618      [03-18-18 @ 18:40]  Urine Potassium 26      [03-18-18 @ 15:52]  Urine Chloride <35      [03-18-18 @ 15:52]    Iron 22, TIBC 182, %sat 12      [02-13-18 @ 12:44]  Ferritin 79.0      [02-13-18 @ 12:44]  HbA1c 5.3      [03-18-18 @ 11:20]  TSH 1.48      [02-27-18 @ 08:13]  Lipid: chol 62, TG 33, HDL 17, LDL 38      [06-07-17 @ 06:14]

## 2018-03-19 NOTE — PROGRESS NOTE ADULT - SUBJECTIVE AND OBJECTIVE BOX
INFECTIOUS DISEASES FOLLOW UP-- Sujey Lujan  [phone]    This is a follow up note for this  67yMale with s/p orthotopic heart transplant on 18. Slowly improving. Ambulated up a few stairs today      ROS:  CONSTITUTIONAL:  No fever, good appetite  CARDIOVASCULAR:  No chest pain or palpitations  RESPIRATORY:  No dyspnea  GASTROINTESTINAL:  No nausea, vomiting, diarrhea, or abdominal pain  GENITOURINARY:  No dysuria  NEUROLOGIC:  No headache,     Allergies    No Known Allergies    Intolerances        ANTIBIOTICS/RELEVANT:  antimicrobials  atovaquone Suspension 1500 milliGRAM(s) Oral daily  nystatin    Suspension 125935 Unit(s) Oral every 6 hours  valGANciclovir 450 milliGRAM(s) Oral <User Schedule>    immunologic:  mycophenolate mofetil 1000 milliGRAM(s) Oral <User Schedule>  tacrolimus 7 milliGRAM(s) Oral <User Schedule>  tacrolimus 6 milliGRAM(s) Oral <User Schedule>    OTHER:  acetaminophen   Tablet. 650 milliGRAM(s) Oral every 6 hours PRN  ALBUTerol/ipratropium for Nebulization 3 milliLiter(s) Nebulizer every 6 hours  aspirin enteric coated 81 milliGRAM(s) Oral daily  Calcium Citrate + Vit D, 315 mg/200 Unit 2 Tablet(s) 2 Tablet(s) Oral two times a day  diltiazem    milliGRAM(s) Oral daily  docusate sodium 100 milliGRAM(s) Oral three times a day  heparin  Injectable 71846 Unit(s) SubCutaneous two times a day  insulin lispro (HumaLOG) corrective regimen sliding scale   SubCutaneous three times a day before meals  insulin lispro (HumaLOG) corrective regimen sliding scale   SubCutaneous at bedtime  multivitamin 1 Tablet(s) Oral daily  pantoprazole    Tablet 40 milliGRAM(s) Oral before breakfast  pravastatin 20 milliGRAM(s) Oral at bedtime  predniSONE   Tablet 10 milliGRAM(s) Oral every 12 hours  silver sulfADIAZINE 1% Cream 1 Application(s) Topical two times a day      Objective:  Vital Signs Last 24 Hrs  T(C): 35.8 (19 Mar 2018 07:53), Max: 36.9 (18 Mar 2018 19:40)  T(F): 96.4 (19 Mar 2018 07:53), Max: 98.4 (18 Mar 2018 19:40)  HR: 100 (19 Mar 2018 07:53) (97 - 104)  BP: 106/74 (19 Mar 2018 07:53) (100/66 - 112/75)  BP(mean): 86 (19 Mar 2018 07:53) (80 - 89)  RR: 18 (19 Mar 2018 07:53) (18 - 18)  SpO2: 99% (19 Mar 2018 07:53) (96% - 100%)    PHYSICAL EXAM:  Constitutional:no acute distress  Eyes:AWLT, EOMI  Ear/Nose/Throat: no oral lesions, 	  Respiratory: clear BL but absent BS at right base  Cardiovascular: S1S2  Gastrointestinal:soft, (+) BS, no tenderness  Extremities:no e/e/c right middle finger remains with blackened nail bed  No Lymphadenopathy  IV sites not inflammed.    LABS:                        8.1    15.4  )-----------( 461      ( 19 Mar 2018 00:14 )             24.9     -    136  |  104  |  61<H>  ----------------------------<  114<H>  4.9   |  21<L>  |  3.61<H>    Ca    8.7      19 Mar 2018 00:14  Mg     2.3         TPro  6.4  /  Alb  2.9<L>  /  TBili  0.4  /  DBili  x   /  AST  17  /  ALT  21  /  AlkPhos  100  -    PTT - ( 19 Mar 2018 00:14 )  PTT:79.1 sec  Urinalysis Basic - ( 18 Mar 2018 15:52 )    Color: Yellow / Appearance: SL Turbid / S.018 / pH: x  Gluc: x / Ketone: Negative  / Bili: Negative / Urobili: Negative   Blood: x / Protein: 100 mg/dL / Nitrite: Negative   Leuk Esterase: Negative / RBC: 0-2 /HPF / WBC 0-2 /HPF   Sq Epi: x / Non Sq Epi: x / Bacteria: Few /HPF        MICROBIOLOGY:            RECENT CULTURES:   @ 21:44  .Sputum Sputum  Pseudomonas aeruginosa (Carbapenem Resistant)  Pseudomonas aeruginosa (Carbapenem Resistant)  KAMILA    Rare Pseudomonas aeruginosa (Carbapenem Resistant)  Normal Respiratory Heaven present  --      RADIOLOGY & ADDITIONAL STUDIES:    < from: Xray Chest 1 View- PORTABLE-Routine (18 @ 05:31) >  INTERPRETATION:  CLINICAL INFORMATION: Heart transplant.    A single portable view of the chest was obtained.     Comparison: 3/18/2018.     The mediastinal cardiac silhouette is unremarkable. Sternotomy.    Elevated right hemidiaphragm and right effusion. Minimal linear   atelectasis at the left lung base.    No acute osseous finding. Severe degenerative changes of the shoulders.   Right axillary clips.    IMPRESSION:    As above.    < end of copied text >

## 2018-03-19 NOTE — PROGRESS NOTE ADULT - ASSESSMENT
67M -PMH: GIB, LVAD implant, afib, anemia AICD, CHF  2/23/18 s/p  Heart transplant  Intra op acute rejection of graft and Intra-op IABP placed  and removed POD 1 and received plasmaphoresis x 2  2/26 UE +RT IJ inominate SC,cephalic DVT and +lt SC DVT  3/1  cardiac bx low grade rejection  3/2 bronchoscopy   neg   3/4  t/c too floor,  Imipenum for low grade fevers  3/5 Sputum + PSA, ID to see pts/p  Heart transplant 2/23/18   Vac changed to RLQ  3/6 Pt on cefepime  3/8 Cardiac biopsy this am - prograf level - 7- prograf level increased to 8mg po bid  toprol d/c'd,  Cardizem CD 120mg po initiated  high filling pressures noted on right heart cath-lasix 40mg po daily started as per HF  K+= 6 - Bactrim d/c'd - Mepron to start in am for prophylaxis  rpt k+ = 5.2-  daily prograf levels, daily CXR, bid CMP & CBC  plastics consult for ?necrotic right 3rd digit  Cardiac biopsy results pending- depending on biopsy results - HF to adjust prednisone taper  d/c planning  3/9 prednisone 12.5 bid.  Lasix x 1 in am tomorrow, then d/c.   Prograf 10.4, level increased from 7.9 so will need a prograf level tonight at 7 pm, Dr. Mccabe to be notified.  K elevated , if continues to rise may need  kaexalate  Finger with  possible necrosis 3rd R.  Tophus noted by rheumatology,+ crystals  specimen sent.  Hand /plastics consulted, finger  drained, may need further intervention  3/10  WBC to 29   afebrile,  ct abdomen  chest pelvis ordered,   Prograf level 11,  vss   3/11 wbc trending down.   CT Chest/Pelvis:   Small fluid collection inferior to the xiphoid process likely the site of   prior drain.-7 mm nodule in the lingula, new from prior imaging.  No evidence of infection in the abdomen or pelvis.    Hct down , if  decreases further on repeat labs , PRBC to be given  Finger  with less discomfort, no drainage today unable to culture.  Minimal drainage after it was drained on  3/9, culture not sent during   BC & UA neg 3/9 neg to date  Driveline site  cx negative from 3/5  Tacro level 14.2, D/W hf , MAINTAIN CURRENT DOSAGE  Cont IV abx  Cont lovenox for dvt  3/12 Pt with decreased hct, 1uprbc being given.  Plastics f/u , would use silvadine  on finger, no further intervention at this time  3/13 The pt ambulated on stairs.  Prograf level 10  3/12/18 Rpt. duplex: [facility] Duplex Upper Ext Vein Scan, Bilat   Nonocclusive DVT of the R internal jugular vein. Persistent occlusive   DVT in the innominate and subclavian veins. Superficial thrombosis of the   R cephalic vein.  Persistent occlusive DVT L subclavian vein. Nonocclusive DVT of   the L internal jugular and innominate veins. Nonocclusive DVT of the   axillary and brachial vein surrounding a left PICC.    Studies reviewed w Dr Tavarez, will continue lvx 80 bid  3/14 Cardiac Biopsy, resume lovenox tonight  3/14 -   RLL collapse with persistent wbc , for bronch today  with Dr Sutherland, completed  Prograf 12, dose remains at 8mg bid  /72- 125/77  with Cardizem increased yesterday to 180mg daily  PT changed wound vac  3/15 Pt reports feeling better today. K+5.6 overnight, repeat 4.6 this AM, will cont to monitor.    AFB and cultures 3/14  results pending.   Prednisone taper 10 mg today. Midline d/c'd. PIV placed.   Prograf level 15.6 will discuss decrease in Prograf dose with Dr. Annabelle Mccabe  d/c planning anticipate Home Monday 3/19/18  3/16  tacro level 13.4 on decreased prograff dose of 7.  The Midline was d/c , PIV placed.  Prednisone was decreased  on 3/14.  The wbc is down today to 17.  To d/w ID the antibiotic regimen and question if they will be needed long term.  His creat was Up to1.86 today, d/w HF lasix 20 x 1 given,   3/17 Right bunion/blister lanced by podiatry 3/18 Renal consulted for NORMAN , PVR 0cc,  Urine cr, na, k, cl, and bun to calculate feurea,  Cardizem CD decrease to 120 tomorrow, mag oxide discontinued, decreased prograf 6mg bid starting tonight.   3/19 Pt's creat continues to increase, nephrology f/u  Venous renal US ordered.  Anticoagulation changed to 50064 of sq heparin, ptt 79.  Repeat echo and repeat UE venous duplex  today

## 2018-03-19 NOTE — PROGRESS NOTE ADULT - ASSESSMENT
Assessment:  1.  Bilateral, extensive upper extremity DVT  - Currently on lovenox  - Swelling of RUE controlled with elevation and ace wrap  2.  Heart Transplant  3.  Acute renal failure  4.  Right 3rf digit ischemia - stable  5.  Right foot bunion      Plan  1.  Continue with heparin 250 units per Kilogram every 12 hours - his PTT is at goal today.    2.  Please check PTT 6 hours after this evenings heparin injection.    3.  Appreciate podiatry followup of L foot  4.  His R upper arm feels firm, but is compressible and without symptoms with strong radial pulse.  Lets repeat an upper extremity venous duplex today (bilateral).  PT is doing a good job wrapping the L arm, no signs of phlegmasia.      [facility] [id number]

## 2018-03-19 NOTE — PROGRESS NOTE ADULT - ATTENDING COMMENTS
Seen resting in bed.  A&Ox3.  Feeling well today.  Ambulated with PT and climbed stairs.  Slept well last night. Appetite good.  Reports his mood is "better."  Denies symptoms of anxiety or depression.  Reports feeling "a little nervous" about going home related to managing his care.  Has good support system (brother Nawaf Barahona and close friend Tahira) who have been communicating with HT coordinator.  They are both involved in his care and available for ongoing support with medication and other aspects of his care.  Receptive to support and validation.     DX: Adjustment disorder       Recommendations:   Behavioral Cardiology will continue to follow as needed

## 2018-03-19 NOTE — PROGRESS NOTE ADULT - ASSESSMENT
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 Found to have b/l IJ/subclavian thrombi as well.    #Biopsies  EMB #1: 1R/1A, AMR 1. DSA negative.  EMB #2: 1R/1A, AMR 1. DSA negative.  EMB #3: 1R/1A. stable AMR1. No DSA. RA 12, PA 40/16/27, PCW 11, PA 67%, CO/CI 6.8/3.6. TTE with mild RV enlargement and dysfunction.  EMB #4: to be scheduled for Thurs, 3/22, 8:30 AM    # Immunosuppression prophylaxis:  Tacrolimus -  Currently on 7mg PO Q12, which was increased from 6mg last night. Tacro level 13.2 this morning from 11.5, which is within target trough level 12-14. May need to reduce PM dose as level uptrending in setting of decreased GFR.  CellCept - continue 1000 mg PO BID. Would not reduce dose despite his age given AMR 1 rejection.  Steroids - continue prednisone 10 mg Q12 until next biopsy  	  # Antimicrobial prophylaxis:  Intermediate risk CMV - Adjust Valcyte for NORMAN to 450 mg daily just TWICE weekly. Last dose on Saturday so will schedule for Tues/Sat.  Intermediate risk Toxo - continue Mepron 1500mg PO daily with food.  PCP - continue Mepron 1500mg PO daily  Thrush - continue Nystatin S/S 5 mL QID    # Additional HCM:  - Citracal + D 2 tabs PO BID  - multivitamin 1 tab daily  - Stress ulcer ppx while on steriods: protonix to 40mg PO daily  - Please check daily Mg level. Mag oxide discontinued currently. Mag 2.3 this AM.    # Bilateral IJ/Subclavian thrombi - unchanged on f/u U/S 3/12  - lovenox switched to heparin 98232 units subcutaneous Q12 (250 units/kg Q12) per vasc cards recs in setting of renal function.  - CHERIE negative    # CAV PPx  - Continue ECASA 81mg PO daily  - Continue pravastatin 20mg PO QHS    #NORMAN likely related to the Vancomycin dosing  - BUN/Cr continuing to uptrend this morning.   - Vanco stopped. DLES looked very good yesterday and WBC trending down. Received dose of dapto. Per ID, reassessing vanc level prior to redosing daptomycin.   - He responded to diuretics given on Friday. No further diuresis.  - Please check daily weights and track urine output (NO HOBSON). Weight up 0.6kg this morning.   - Cardio-Renal input appreciated  - Renal ultrasound pending    #Hypertension  - BP ranging 97/54- 112/75 over past 24 hours. Cardizem decreased to 120mg daily as of this morning.     # ID   - Pseudomonas in sputum - course of cefepime completed 3/15  - DLES improved and vancomycin stopped given high trough with NORMAN. Per ID, will redose with daptomycin depending upon repeat vanc level this morning.   - silver sulfadizaine dressings per plastics for R 3rd digit.   - HCV viral load and PCR per protocol. Therapy to be initiated as outpatient by Dr. 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 Found to have b/l IJ/subclavian thrombi as well.    #Biopsies  EMB #1: 1R/1A, AMR 1. DSA negative.  EMB #2: 1R/1A, AMR 1. DSA negative.  EMB #3: 1R/1A. stable AMR1. No DSA. RA 12, PA 40/16/27, PCW 11, PA 67%, CO/CI 6.8/3.6. TTE with mild RV enlargement and dysfunction.  EMB #4: to be scheduled for Thurs, 3/22, 8:30 AM    # Immunosuppression prophylaxis:  Tacrolimus -  Currently on 7mg PO Q12, which was increased from 6mg last night. Tacro level 13.2 this morning from 11.5, which is within target trough level 12-14. Please adjust tacro dosing to be 7mg at 0800 and 6mg 2000. Tacro level prior to AM dose.  CellCept - continue 1000 mg PO BID. Would not reduce dose despite his age given AMR 1 rejection.  Steroids - continue prednisone 10 mg Q12 until next biopsy  	  # Antimicrobial prophylaxis:  Intermediate risk CMV - Adjust Valcyte for NORMAN to 450 mg daily just TWICE weekly. Last dose on Saturday so will schedule for Tues/Sat.  Intermediate risk Toxo - continue Mepron 1500mg PO daily with food.  PCP - continue Mepron 1500mg PO daily  Thrush - continue Nystatin S/S 5 mL QID    # Additional HCM:  - Citracal + D 2 tabs PO BID  - multivitamin 1 tab daily  - Stress ulcer ppx while on steriods: protonix to 40mg PO daily  - Please check daily Mg level. Mag oxide discontinued currently. Mag 2.3 this AM.    # Bilateral IJ/Subclavian thrombi - unchanged on f/u U/S 3/12  - lovenox switched to heparin 46132 units subcutaneous Q12 (250 units/kg Q12) per vasc cards recs in setting of renal function.  - CHERIE negative    # CAV PPx  - Continue ECASA 81mg PO daily  - Continue pravastatin 20mg PO QHS    #NORMAN likely related to the Vancomycin dosing  - BUN/Cr continuing to uptrend this morning.   - Vanco stopped. DLES looked very good yesterday and WBC trending down. Received dose of dapto. Per ID, reassessing vanc level prior to redosing daptomycin.   - He responded to diuretics given on Friday. No further diuresis.  - Please check daily weights and track urine output (NO HOBSON). Weight up 0.6kg this morning.   - Cardio-Renal input appreciated  - Renal ultrasound pending    #Hypertension  - BP ranging 97/54- 112/75 over past 24 hours. Cardizem decreased to 120mg daily as of this morning.     # ID   - Pseudomonas in sputum - course of cefepime completed 3/15  - DLES improved and vancomycin stopped given high trough with NORMAN. Per ID, will redose with daptomycin depending upon repeat vanc level this morning.   - silver sulfadizaine dressings per plastics for R 3rd digit.   - HCV viral load and PCR per protocol. Therapy to be initiated as outpatient by Dr. 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 Found to have b/l IJ/subclavian thrombi as well.    #Biopsies  EMB #1: 1R/1A, AMR 1. DSA negative.  EMB #2: 1R/1A, AMR 1. DSA negative.  EMB #3: 1R/1A. stable AMR1. No DSA. RA 12, PA 40/16/27, PCW 11, PA 67%, CO/CI 6.8/3.6. TTE with mild RV enlargement and dysfunction.  EMB #4: to be scheduled for Thurs, 3/22, 8:30 AM    # Immunosuppression prophylaxis:  Tacrolimus -  Currently on 7mg PO Q12, which was increased from 6mg last night. Tacro level 13.2 this morning from 11.5, which is within target trough level 12-14. Please adjust tacro dosing to be 7mg at 0800 and 6mg 2000. Tacro level prior to AM dose.  CellCept - continue 1000 mg PO BID. Would not reduce dose despite his age given AMR 1 rejection.  Steroids - continue prednisone 10 mg Q12 until next biopsy  	  # Antimicrobial prophylaxis:  Intermediate risk CMV - Adjust Valcyte for NORMAN to 450 mg daily just TWICE weekly. Last dose on Saturday so will schedule for Tues/Sat.  Intermediate risk Toxo - continue Mepron 1500mg PO daily with food.  PCP - continue Mepron 1500mg PO daily  Thrush - continue Nystatin S/S 5 mL QID    # Additional HCM:  - Citracal + D 2 tabs PO BID  - multivitamin 1 tab daily  - Stress ulcer ppx while on steriods: protonix to 40mg PO daily  - Please check daily Mg level. Mag oxide discontinued currently. Mag 2.3 this AM.    # Bilateral IJ/Subclavian thrombi - unchanged on f/u U/S 3/12  - lovenox switched to heparin 31143 units subcutaneous Q12 (250 units/kg Q12) per vasc cards recs in setting of renal function. Please hold 3/19 PM and 3/20 AM dose for RHC.   - CHERIE negative    # CAV PPx  - Continue ECASA 81mg PO daily  - Continue pravastatin 20mg PO QHS    #NORMAN likely related to the Vancomycin dosing  - Plan for RHC 3/20  - BUN/Cr continuing to uptrend this morning.   - Vanco stopped. DLES looked very good yesterday and WBC trending down. Received dose of dapto. Per ID, reassessing vanc level prior to redosing daptomycin.   - He responded to diuretics given on Friday. No further diuresis.  - Please check daily weights and track urine output (NO HOBSON). Weight up 0.6kg this morning.   - Cardio-Renal input appreciated  - Renal ultrasound pending    #Hypertension  - BP ranging 97/54- 112/75 over past 24 hours. Cardizem decreased to 120mg daily as of this morning.     # ID   - Pseudomonas in sputum - course of cefepime completed 3/15  - DLES improved and vancomycin stopped given high trough with NORMAN. Per ID, will redose with daptomycin depending upon repeat vanc level this morning.   - silver sulfadizaine dressings per plastics for R 3rd digit.   - HCV viral load and PCR per protocol. Therapy to be initiated as outpatient by Dr. Thomas (Hepatology)  - Transplant ID following    #endo  - BG currently well controlled, ranging 103-157  - SSI AC & HS    #dispo  - Ongoing work with PT for ongoing exercise tolerance prior to d/c This is a 67 year old man with ACC/AHA stage D chronic systolic heart failure due to NICM s/p HM2 LVAD 6/17 c/b pump thrombosis now s/p heart transplant on 2/23 (CMV D-/R+ and Toxo D-/R+), with post-op course c/b primary graft dysfunction, initially thought to be immune-mediated due to positive B-cell flow (negative CDC cross match) and received plasmapharesis/IVIg x2 with improvement in LV function. His course has been complicated by bilateral IJ/subclavian thrombi. He currently has acute renal failure of unclear etiology. However, he does not have clear evidence of allograft dysfunction or low output on exam.     #Heart Transplant  EMB #1: 1R/1A, AMR 1. DSA negative.  EMB #2: 1R/1A, AMR 1. DSA negative.  EMB #3: 1R/1A. stable AMR1. No DSA. RA 12, PA 40/16/27, PCW 11, PA 67%, CO/CI 6.8/3.6. TTE with mild RV enlargement and dysfunction.  EMB #4: to be scheduled for Tuesday, 3/20 given acute renal failure.     # Immunosuppression:  Tacrolimus -  Currently on 7mg PO Q12, which was increased from 6mg last night. Tacro level 13.2 this morning from 11.5, which is within target trough level 12-14. Please adjust tacro dosing to be 7mg at 0800 and 6mg 2000. Tacro level prior to AM dose.  CellCept - continue 1000 mg PO BID.   Steroids - continue prednisone 10 mg Q12     #Hypertension  - BP ranging 97/54- 112/75 over past 24 hours. Cardizem decreased to 120mg daily as of this morning.   	  # Antimicrobial prophylaxis:  Intermediate risk CMV - Adjust Valcyte for NORMAN to 450 mg daily just TWICE weekly. Last dose on Saturday so will schedule for Tues/Sat.  Intermediate risk Toxo - continue Mepron 1500mg PO daily with food.  PCP - continue Mepron 1500mg PO daily  Thrush - continue Nystatin S/S 5 mL QID    # Bilateral IJ/Subclavian thrombi - unchanged on f/u U/S 3/12  - lovenox switched to heparin 38025 units subcutaneous Q12 (250 units/kg Q12) per vasc cards recs in setting of renal function. Please hold 3/19 PM and 3/20 AM dose for RHC.   - CHERIE negative    #Acute renal failure of unclear etiology  - Vanco stopped.   - He responded to diuretics given on Friday. No further diuresis.   - Cardio-Renal input appreciated  - Renal ultrasound pending    # CAV PPx  - Continue ECASA 81mg PO daily  - Continue pravastatin 20mg PO QHS    # ID   - Pseudomonas in sputum - course of cefepime completed 3/15  - DLES improved and vancomycin stopped given high trough with NORMAN. Per ID, will redose with daptomycin depending upon repeat vanc level this morning.   - silver sulfadizaine dressings per plastics for R 3rd digit.   - HCV viral load and PCR per protocol. Therapy to be initiated as outpatient by Dr. Thomas (Hepatology)  - Transplant ID following    #endo  - BG currently well controlled, ranging 103-157  - SSI AC & HS    # Additional prevention:  - Citracal + D 2 tabs PO BID  - multivitamin 1 tab daily  - Stress ulcer ppx while on steroids: will change pantoprazole to ranitidine per renal recommendations.  - Please check daily Mg level. Mag oxide discontinued currently. Mag 2.3 this AM.    #dispo  - Ongoing work with PT for ongoing exercise tolerance prior to d/c

## 2018-03-19 NOTE — PROGRESS NOTE ADULT - PROBLEM SELECTOR PLAN 1
S/p  heart transplant  2/23  on rejection meds with HF following,  -Tacrolimus level 13.4 this am - will discuss dosage change with DR. Mccabe- CHF team  Tacrolimus 7mg PO BID  check levels daily  with goal 12-14 per HF team   -Continue CellCept 1000 mg PO BID  -Steroid taper prednisone 10 mg PO BID     -LVAD site  with VAC in place for drainage, changed by PT 3/14  -HIT Ab positive; CHERIE negative; , per vasc cards will need AC for at least 3 months

## 2018-03-19 NOTE — PROGRESS NOTE ADULT - SUBJECTIVE AND OBJECTIVE BOX
VASCULAR MEDICINE                         CC:  UED[facility]    INTERVAL HISTORY:    Creatinine continues to uptrend.  No CP or SOB.  Sitting in chair. R arm without symptoms, currently unwrapped- but was wrapped all night.  PT is going to re-wrap arm right now.      Allergies    No Known Allergies      MEDICATIONS  (STANDING):  ALBUTerol/ipratropium for Nebulization 3 milliLiter(s) Nebulizer every 6 hours  aspirin enteric coated 81 milliGRAM(s) Oral daily  atovaquone Suspension 1500 milliGRAM(s) Oral daily  Calcium Citrate + Vit D, 315 mg/200 Unit 2 Tablet(s) 2 Tablet(s) Oral two times a day  diltiazem    milliGRAM(s) Oral daily  docusate sodium 100 milliGRAM(s) Oral three times a day  heparin  Injectable 29335 Unit(s) SubCutaneous two times a day  insulin lispro (HumaLOG) corrective regimen sliding scale   SubCutaneous three times a day before meals  insulin lispro (HumaLOG) corrective regimen sliding scale   SubCutaneous at bedtime  multivitamin 1 Tablet(s) Oral daily  mycophenolate mofetil 1000 milliGRAM(s) Oral <User Schedule>  nystatin    Suspension 193051 Unit(s) Oral every 6 hours  pantoprazole    Tablet 40 milliGRAM(s) Oral before breakfast  pravastatin 20 milliGRAM(s) Oral at bedtime  predniSONE   Tablet 10 milliGRAM(s) Oral every 12 hours  silver sulfADIAZINE 1% Cream 1 Application(s) Topical two times a day  tacrolimus 7 milliGRAM(s) Oral <User Schedule>  valGANciclovir 450 milliGRAM(s) Oral <User Schedule>    MEDICATIONS  (PRN):  acetaminophen   Tablet. 650 milliGRAM(s) Oral every 6 hours PRN Mild Pain (1 - 3)    SOCIAL HISTORY:  unchanged    REVIEW OF SYSTEMS:  CONSTITUTIONAL: No fever, weight loss, or fatigue  EYES: No eye pain, visual disturbances, or discharge  ENMT:  No difficulty hearing, tinnitus, vertigo; No sinus or throat pain  NECK: No pain or stiffness  RESPIRATORY: No cough, wheezing, chills or hemoptysis; No Shortness of Breath  CARDIOVASCULAR: No chest pain, palpitations, passing out, dizziness, or leg swelling  GASTROINTESTINAL: No abdominal or epigastric pain. No nausea, vomiting, or hematemesis; No diarrhea or constipation. No melena or hematochezia.  GENITOURINARY: No dysuria, frequency, hematuria, or incontinence  NEUROLOGICAL: No headaches, memory loss, loss of strength, numbness, or tremors  SKIN: No itching, burning, rashes, or lesions   LYMPH Nodes: No enlarged glands  ENDOCRINE: No heat or cold intolerance; No hair loss  MUSCULOSKELETAL: No joint pain or swelling; No muscle, back, or extremity pain  PSYCHIATRIC: No depression, anxiety, mood swings, or difficulty sleeping  HEME/LYMPH: No easy bruising, or bleeding gums  ALLERY AND IMMUNOLOGIC: No hives or eczema	    [x ] All others negative	  [ ] Unable to obtain    ICU Vital Signs Last 24 Hrs  T(C): 35.8 (19 Mar 2018 07:53), Max: 36.9 (18 Mar 2018 19:40)  T(F): 96.4 (19 Mar 2018 07:53), Max: 98.4 (18 Mar 2018 19:40)  HR: 100 (19 Mar 2018 07:53) (97 - 104)  BP: 106/74 (19 Mar 2018 07:53) (97/54 - 112/75)  BP(mean): 86 (19 Mar 2018 07:53) (80 - 89)  ABP: --  ABP(mean): --  RR: 18 (19 Mar 2018 07:53) (18 - 18)  SpO2: 99% (19 Mar 2018 07:53) (95% - 100%)    Appearance: Normal	  HEENT:   Normal oral mucosa, PERRL, EOMI	  Lymphatic: No lymphadenopathy  Cardiovascular: Normal S1 S2   Respiratory: Lungs clear to auscultation	  Psychiatry: A & O x 3, Mood & affect appropriate  Gastrointestinal:  Soft, Non-tender, + BS	  Skin: No rashes, No ecchymoses, No cyanosis	  Neurologic: Non-focal  Extremities:  Right upper extremity bicep area fullness and edema.  Forearm without edema.  Hands are warm.  R 3rd digit is wrapped.                             8.1    15.4  )-----------( 461      ( 19 Mar 2018 00:14 )             24.9   03-19    136  |  104  |  61<H>  ----------------------------<  114<H>  4.9   |  21<L>  |  3.61<H>    Ca    8.7      19 Mar 2018 00:14  Mg     2.3     03-19    TPro  6.4  /  Alb  2.9<L>  /  TBili  0.4  /  DBili  x   /  AST  17  /  ALT  21  /  AlkPhos  100  03-19

## 2018-03-19 NOTE — PROGRESS NOTE ADULT - SUBJECTIVE AND OBJECTIVE BOX
im good  VITAL SIGNS    Telemetry:   nsr    Vital Signs Last 24 Hrs  T(C): 35.8 (18 @ 07:53), Max: 36.9 (18 @ 19:40)  T(F): 96.4 (18 @ 07:53), Max: 98.4 (18 @ 19:40)  HR: 100 (18 @ 07:53) (97 - 104)  BP: 106/74 (18 @ 07:53) (97/54 - 112/75)  RR: 18 (18 @ 07:53) (18 - 18)  SpO2: 99% (18 @ 07:53) (95% - 100%)                    @ 07:01  -   @ 07:00  --------------------------------------------------------  IN: 960 mL / OUT: 770 mL / NET: 190 mL     @ 07:01  -   @ 11:34  --------------------------------------------------------  IN: 360 mL / OUT: 0 mL / NET: 360 mL          Daily     Daily Weight in k.6 (19 Mar 2018 06:37)        CAPILLARY BLOOD GLUCOSE  103 (19 Mar 2018 07:53)      POCT Blood Glucose.: 103 mg/dL (19 Mar 2018 07:55)  POCT Blood Glucose.: 152 mg/dL (18 Mar 2018 22:13)  POCT Blood Glucose.: 116 mg/dL (18 Mar 2018 19:38)  POCT Blood Glucose.: 157 mg/dL (18 Mar 2018 11:36)                  Coumadin    [ ] YES          [  ]      NO         Reason:                               PHYSICAL EXAM        Neurology: alert and oriented x 3, nonfocal, no gross deficits  CV : S1 S2 , RRR   Sternal Wound :  CDI , Stable  Lungs: bibasilar crackles  Drains:   Rlw vac  Abdomen: soft, nontender, nondistended, positive bowel sounds, last bowel movement +  :     voiding        Extremities:     - edema - calve tenderness            acetaminophen   Tablet. 650 milliGRAM(s) Oral every 6 hours PRN  ALBUTerol/ipratropium for Nebulization 3 milliLiter(s) Nebulizer every 6 hours  aspirin enteric coated 81 milliGRAM(s) Oral daily  atovaquone Suspension 1500 milliGRAM(s) Oral daily  Calcium Citrate + Vit D, 315 mg/200 Unit 2 Tablet(s) 2 Tablet(s) Oral two times a day  diltiazem    milliGRAM(s) Oral daily  docusate sodium 100 milliGRAM(s) Oral three times a day  heparin  Injectable 89188 Unit(s) SubCutaneous two times a day  insulin lispro (HumaLOG) corrective regimen sliding scale   SubCutaneous three times a day before meals  insulin lispro (HumaLOG) corrective regimen sliding scale   SubCutaneous at bedtime  multivitamin 1 Tablet(s) Oral daily  mycophenolate mofetil 1000 milliGRAM(s) Oral <User Schedule>  nystatin    Suspension 505084 Unit(s) Oral every 6 hours  pantoprazole    Tablet 40 milliGRAM(s) Oral before breakfast  pravastatin 20 milliGRAM(s) Oral at bedtime  predniSONE   Tablet 10 milliGRAM(s) Oral every 12 hours  silver sulfADIAZINE 1% Cream 1 Application(s) Topical two times a day  tacrolimus 7 milliGRAM(s) Oral <User Schedule>  valGANciclovir 450 milliGRAM(s) Oral <User Schedule>                    Physical Therapy Rec:   Home  [  ]   Home w/ PT  [  ]  Rehab  [  ]  Discussed with Cardiothoracic Team at AM rounds.

## 2018-03-19 NOTE — PROGRESS NOTE ADULT - ASSESSMENT
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 Found to have b/l IJ/subclavian thrombi as well.          	   Antimicrobial prophylaxis:  Intermediate risk CMV - Adjust Valcyte for NORMAN to 450 mg daily just TWICE weekly. Last dose on Saturday so will schedule for Tues/Sat.  Intermediate risk Toxo - continue Mepron 1500mg PO daily with food.  PCP - continue Mepron 1500mg PO daily  Thrush - continue Nystatin S/S 5 mL Qid      - Pseudomonas in sputum - course of cefepime completed 3/15/18  - silver sulfadizaine dressings per plastics for R 3rd digit.   - HCV viral load and PCR per protocol. weekly  Therapy to be initiated as outpatient.  - old LVAD site wound with wound vac in place and slowly granulating without purulence  - Vancomycin trough elevated and Vancomycin on hold - repeat level daily, follow renal function

## 2018-03-19 NOTE — PROGRESS NOTE ADULT - SUBJECTIVE AND OBJECTIVE BOX
Subjective:  Reports feeling well this morning. Ambulated and climbed stairs with PT during which he reports dyspnea, however feels it is less than days prior. Denies SOB at rest, orthopnea, PND, palpitations, lightheadedness, dizziness, fevers, chills.     Medications:  acetaminophen   Tablet. 650 milliGRAM(s) Oral every 6 hours PRN  ALBUTerol/ipratropium for Nebulization 3 milliLiter(s) Nebulizer every 6 hours  aspirin enteric coated 81 milliGRAM(s) Oral daily  atovaquone Suspension 1500 milliGRAM(s) Oral daily  Calcium Citrate + Vit D, 315 mg/200 Unit 2 Tablet(s) 2 Tablet(s) Oral two times a day  diltiazem    milliGRAM(s) Oral daily  docusate sodium 100 milliGRAM(s) Oral three times a day  heparin  Injectable 09454 Unit(s) SubCutaneous two times a day  insulin lispro (HumaLOG) corrective regimen sliding scale   SubCutaneous three times a day before meals  insulin lispro (HumaLOG) corrective regimen sliding scale   SubCutaneous at bedtime  multivitamin 1 Tablet(s) Oral daily  mycophenolate mofetil 1000 milliGRAM(s) Oral <User Schedule>  nystatin    Suspension 178682 Unit(s) Oral every 6 hours  pantoprazole    Tablet 40 milliGRAM(s) Oral before breakfast  pravastatin 20 milliGRAM(s) Oral at bedtime  predniSONE   Tablet 10 milliGRAM(s) Oral every 12 hours  silver sulfADIAZINE 1% Cream 1 Application(s) Topical two times a day  tacrolimus 7 milliGRAM(s) Oral <User Schedule>  valGANciclovir 450 milliGRAM(s) Oral <User Schedule>      Physical Exam:    Vitals:  Vital Signs Last 24 Hours  T(C): 35.8 (18 @ 07:53), Max: 36.9 (18 @ 19:40)  HR: 100 (18 @ 07:53) (97 - 104)  BP: 106/74 (18 @ 07:53) (100/66 - 112/75)  RR: 18 (18 @ 07:53) (18 - 18)  SpO2: 99% (18 @ 07:53) (96% - 100%)    Weight in k.6 (03-19 @ 06:37)    I&O's Summary    18 Mar 2018 07:  -  19 Mar 2018 07:00  --------------------------------------------------------  IN: 960 mL / OUT: 770 mL / NET: 190 mL    19 Mar 2018 07:01  -  19 Mar 2018 13:23  --------------------------------------------------------  IN: 600 mL / OUT: 0 mL / NET: 600 mL    Tele: [facility] 90-110s.    General: Sitting OOB in chair. No distress. Comfortable.  Neck: Neck supple. JVP not elevated.  Chest: Clear to auscultation bilaterally  CV: Tachycardic, regular. normal S1 and S2. No murmurs, rub, or gallops. +2 radial pulses bilaterally. RUE mild swelling, wrapped with ace wrap. No LE edema.   Abdomen: Soft, non-distended, non-tender, + BS  Skin: R 3rd digit dressed. Dressing c/d/i. RLQ abd wound vac in place without drainage. Sternal incision with steristrips intact. Well approximated without drainage or erythema. Midline abd wounds open to air, dry without drainage.  Neurology: Alert and oriented times three. Sensation intact  Psych: Affect normal    Labs:                        8.1    15.4  )-----------( 461      ( 19 Mar 2018 00:14 )             24.9         136  |  104  |  61<H>  ----------------------------<  114<H>  4.9   |  21<L>  |  3.61<H>    Ca    8.7      19 Mar 2018 00:14  Mg     2.3         TPro  6.4  /  Alb  2.9<L>  /  TBili  0.4  /  DBili  x   /  AST  17  /  ALT  21  /  AlkPhos  100      PTT - ( 19 Mar 2018 00:14 )  PTT:79.1 sec    Tacrolimus (), Serum (18 @ 08:03)    Tacrolimus (), Serum: 13.2  Tacrolimus (), Serum (18 @ 21:10)    Tacrolimus (), Serum: 11.5  Tacrolimus (), Serum (18 @ 07:53)    Tacrolimus (), Serum: 13.4 Subjective:  Reports feeling well this morning. Ambulated and climbed stairs with PT during which he reports dyspnea, however feels it is less than days prior. Denies SOB at rest, orthopnea, PND, palpitations, lightheadedness, dizziness, fevers, chills.     Medications:  acetaminophen   Tablet. 650 milliGRAM(s) Oral every 6 hours PRN  ALBUTerol/ipratropium for Nebulization 3 milliLiter(s) Nebulizer every 6 hours  aspirin enteric coated 81 milliGRAM(s) Oral daily  atovaquone Suspension 1500 milliGRAM(s) Oral daily  Calcium Citrate + Vit D, 315 mg/200 Unit 2 Tablet(s) 2 Tablet(s) Oral two times a day  diltiazem    milliGRAM(s) Oral daily  docusate sodium 100 milliGRAM(s) Oral three times a day  heparin  Injectable 61897 Unit(s) SubCutaneous two times a day  insulin lispro (HumaLOG) corrective regimen sliding scale   SubCutaneous three times a day before meals  insulin lispro (HumaLOG) corrective regimen sliding scale   SubCutaneous at bedtime  multivitamin 1 Tablet(s) Oral daily  mycophenolate mofetil 1000 milliGRAM(s) Oral <User Schedule>  nystatin    Suspension 940917 Unit(s) Oral every 6 hours  pantoprazole    Tablet 40 milliGRAM(s) Oral before breakfast  pravastatin 20 milliGRAM(s) Oral at bedtime  predniSONE   Tablet 10 milliGRAM(s) Oral every 12 hours  silver sulfADIAZINE 1% Cream 1 Application(s) Topical two times a day  tacrolimus 7 milliGRAM(s) Oral <User Schedule>  valGANciclovir 450 milliGRAM(s) Oral <User Schedule>      Physical Exam:    Vitals:  Vital Signs Last 24 Hours  T(C): 35.8 (18 @ 07:53), Max: 36.9 (18 @ 19:40)  HR: 100 (18 @ 07:53) (97 - 104)  BP: 106/74 (18 @ 07:53) (100/66 - 112/75)  RR: 18 (18 @ 07:53) (18 - 18)  SpO2: 99% (18 @ 07:53) (96% - 100%)    Weight in k.6 (03-19 @ 06:37)    I&O's Summary    18 Mar 2018 07:  -  19 Mar 2018 07:00  --------------------------------------------------------  IN: 960 mL / OUT: 770 mL / NET: 190 mL    19 Mar 2018 07:01  -  19 Mar 2018 13:23  --------------------------------------------------------  IN: 600 mL / OUT: 0 mL / NET: 600 mL    Tele: [facility] 90-110s.    General: Sitting OOB in chair. No distress. Comfortable.  Neck: Neck supple. JVP not elevated.  Chest: Diminished RLL, otherwise CTA bilaterally.  CV: Tachycardic, regular. normal S1 and S2. No murmurs, rub, or gallops. +2 radial pulses bilaterally. RUE mild swelling, wrapped with ace wrap. No LE edema.   Abdomen: Soft, non-distended, non-tender, + BS  Skin: R 3rd digit dressed. Dressing c/d/i. RLQ abd wound vac in place without drainage. Sternal incision with steristrips intact. Well approximated without drainage or erythema. Midline abd wounds open to air, dry without drainage.  Neurology: Alert and oriented times three. Sensation intact  Psych: Affect normal    Labs:                        8.1    15.4  )-----------( 461      ( 19 Mar 2018 00:14 )             24.9         136  |  104  |  61<H>  ----------------------------<  114<H>  4.9   |  21<L>  |  3.61<H>    Ca    8.7      19 Mar 2018 00:14  Mg     2.3         TPro  6.4  /  Alb  2.9<L>  /  TBili  0.4  /  DBili  x   /  AST  17  /  ALT  21  /  AlkPhos  100      PTT - ( 19 Mar 2018 00:14 )  PTT:79.1 sec    Tacrolimus (), Serum (18 @ 08:03)    Tacrolimus (), Serum: 13.2  Tacrolimus (), Serum (18 @ 21:10)    Tacrolimus (), Serum: 11.5  Tacrolimus (), Serum (18 @ 07:53)    Tacrolimus (), Serum: 13.4 Subjective:  Reports feeling well this morning. Ambulated and climbed stairs with PT during which he reports dyspnea, however feels it is less than days prior. Denies SOB at rest, orthopnea, PND, palpitations, lightheadedness, dizziness, fevers, chills.     Medications:  acetaminophen   Tablet. 650 milliGRAM(s) Oral every 6 hours PRN  ALBUTerol/ipratropium for Nebulization 3 milliLiter(s) Nebulizer every 6 hours  aspirin enteric coated 81 milliGRAM(s) Oral daily  atovaquone Suspension 1500 milliGRAM(s) Oral daily  Calcium Citrate + Vit D, 315 mg/200 Unit 2 Tablet(s) 2 Tablet(s) Oral two times a day  diltiazem    milliGRAM(s) Oral daily  docusate sodium 100 milliGRAM(s) Oral three times a day  heparin  Injectable 04553 Unit(s) SubCutaneous two times a day  insulin lispro (HumaLOG) corrective regimen sliding scale   SubCutaneous three times a day before meals  insulin lispro (HumaLOG) corrective regimen sliding scale   SubCutaneous at bedtime  multivitamin 1 Tablet(s) Oral daily  mycophenolate mofetil 1000 milliGRAM(s) Oral <User Schedule>  nystatin    Suspension 314260 Unit(s) Oral every 6 hours  pantoprazole    Tablet 40 milliGRAM(s) Oral before breakfast  pravastatin 20 milliGRAM(s) Oral at bedtime  predniSONE   Tablet 10 milliGRAM(s) Oral every 12 hours  silver sulfADIAZINE 1% Cream 1 Application(s) Topical two times a day  tacrolimus 7 milliGRAM(s) Oral <User Schedule>  valGANciclovir 450 milliGRAM(s) Oral <User Schedule>      Physical Exam:    Vitals:  Vital Signs Last 24 Hours  T(C): 35.8 (18 @ 07:53), Max: 36.9 (18 @ 19:40)  HR: 100 (18 @ 07:53) (97 - 104)  BP: 106/74 (18 @ 07:53) (100/66 - 112/75)  RR: 18 (18 @ 07:53) (18 - 18)  SpO2: 99% (18 @ 07:53) (96% - 100%)    Weight in k.6 (03-19 @ 06:37)    I&O's Summary    18 Mar 2018 07:  -  19 Mar 2018 07:00  --------------------------------------------------------  IN: 960 mL / OUT: 770 mL / NET: 190 mL    19 Mar 2018 07:01  -  19 Mar 2018 13:23  --------------------------------------------------------  IN: 600 mL / OUT: 0 mL / NET: 600 mL    Tele: [facility] 90-110s.    General: Sitting OOB in chair. No distress. Comfortable.  Neck: Neck supple. JVP not elevated.  Chest: Diminished RLL, otherwise CTA bilaterally.  CV: Tachycardic, regular. normal S1 and S2. No murmurs, rub, or gallops. +2 radial pulses bilaterally. RUE mild swelling, wrapped with ace wrap. No LE edema.   Abdomen: Soft, non-distended, non-tender, + BS  Skin: R 3rd digit dressed. Dressing c/d/i. RLQ abd wound vac in place without drainage. Sternal incision with steristrips intact. Well approximated without drainage or erythema. Midline abd wounds open to air, dry without drainage.  Neurology: Alert and oriented times three. Sensation intact  Psych: Affect normal    Labs:                        8.1    15.4  )-----------( 461      ( 19 Mar 2018 00:14 )             24.9         136  |  104  |  61<H>  ----------------------------<  114<H>  4.9   |  21<L>  |  3.61<H>    Ca    8.7      19 Mar 2018 00:14  Mg     2.3         TPro  6.4  /  Alb  2.9<L>  /  TBili  0.4  /  DBili  x   /  AST  17  /  ALT  21  /  AlkPhos  100      PTT - ( 19 Mar 2018 00:14 )  PTT:79.1 sec    Tacrolimus (), Serum (18 @ 08:03)    Tacrolimus (), Serum: 13.2  Tacrolimus (), Serum (18 @ 21:10)    Tacrolimus (), Serum: 11.5  Tacrolimus (), Serum (18 @ 07:53)    Tacrolimus (), Serum: 13.4

## 2018-03-19 NOTE — PROGRESS NOTE ADULT - ATTENDING COMMENTS
Nonoliguric renal failure concerning and of unclear etiology. The high vancomycin level may be the culprit, but we will repeat RHC tomorrow to ensure that there is not an occult hemodynamic etiology. Otherwise we will continue management as above.    Plan discussed in multidisciplinary rounds with 2 Gonzalo NP team, Dr. Parker, and pharmacy.     Please call me with questions at [phone].

## 2018-03-19 NOTE — PROGRESS NOTE ADULT - ASSESSMENT
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   Renal reconsulted for elevated cr. It seems cr has been stable at 0.9 - 1.1 but on 3/14 it was 1.2 and has been steadily rising since.   Renal called for evaluation.     ddx is wide at this point. Urine lytes suggest a pre renal insult vs early intrinsic. Right ventricular failure could be possibly affecting some of the rise in crt as noted on recent echo. Meanwhile, vanco level was high and tacrolimus vasoconstriction not aiding as well in the recovery. Other possible ddx could be ATN from hypotension, cyro GN from acute Hep C transfer( checking c3,c4 and cryo) vs renal vein thrombosis( given hx of thrombosis of upper ext and pump)

## 2018-03-19 NOTE — PROGRESS NOTE ADULT - SUBJECTIVE AND OBJECTIVE BOX
Behavioral Cardiology Progress Note     HPI:  Mr. Baez is a 67 year-old man, , only son . Owns house he lives in with his brother.  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  No PPH.     Current stressors:   Hospitalization   s/p Heart transplant (18)      Support system/family support: Good support from family and close friend      Coping strategies: Talking with family and staff, watching TV, his pastora, talking to his granddaughter     Understanding of medical illness and treatment plan:   Ongoing education on medication management provided by HT coordinator and other team members. Strategies being utilized to accommodate limited literacy (pictures of medication, frequent teach-back).  Good understanding of need for lifelong immunosuppressant medication, importance of calling HT coordinator.  Benefits from frequent review and teach-back of all education.     MSE:  Seen resting in bed.  A&Ox3.  Well related with good eye contact. Thought process goal directed. No evidence of any psychosis, delusions, jona.  Mood neutral. Affect slightly restricted. Insight and judgment adequate.

## 2018-03-20 ENCOUNTER — RESULT REVIEW (OUTPATIENT)
Age: 68
End: 2018-03-20

## 2018-03-20 LAB
ALBUMIN SERPL ELPH-MCNC: 3.2 G/DL — LOW (ref 3.3–5)
ALP SERPL-CCNC: 99 U/L — SIGNIFICANT CHANGE UP (ref 40–120)
ALT FLD-CCNC: 20 U/L RC — SIGNIFICANT CHANGE UP (ref 10–45)
ANION GAP SERPL CALC-SCNC: 11 MMOL/L — SIGNIFICANT CHANGE UP (ref 5–17)
AST SERPL-CCNC: 13 U/L — SIGNIFICANT CHANGE UP (ref 10–40)
BILIRUB SERPL-MCNC: 0.4 MG/DL — SIGNIFICANT CHANGE UP (ref 0.2–1.2)
BUN SERPL-MCNC: 65 MG/DL — HIGH (ref 7–23)
CALCIUM SERPL-MCNC: 8.9 MG/DL — SIGNIFICANT CHANGE UP (ref 8.4–10.5)
CHLORIDE SERPL-SCNC: 103 MMOL/L — SIGNIFICANT CHANGE UP (ref 96–108)
CO2 SERPL-SCNC: 22 MMOL/L — SIGNIFICANT CHANGE UP (ref 22–31)
CREAT SERPL-MCNC: 3.79 MG/DL — HIGH (ref 0.5–1.3)
GLUCOSE BLDC GLUCOMTR-MCNC: 129 MG/DL — HIGH (ref 70–99)
GLUCOSE BLDC GLUCOMTR-MCNC: 137 MG/DL — HIGH (ref 70–99)
GLUCOSE BLDC GLUCOMTR-MCNC: 91 MG/DL — SIGNIFICANT CHANGE UP (ref 70–99)
GLUCOSE BLDC GLUCOMTR-MCNC: 96 MG/DL — SIGNIFICANT CHANGE UP (ref 70–99)
GLUCOSE SERPL-MCNC: 95 MG/DL — SIGNIFICANT CHANGE UP (ref 70–99)
HCT VFR BLD CALC: 25.3 % — LOW (ref 39–50)
HGB BLD-MCNC: 8.2 G/DL — LOW (ref 13–17)
MAGNESIUM SERPL-MCNC: 2.3 MG/DL — SIGNIFICANT CHANGE UP (ref 1.6–2.6)
MCHC RBC-ENTMCNC: 29.7 PG — SIGNIFICANT CHANGE UP (ref 27–34)
MCHC RBC-ENTMCNC: 32.4 GM/DL — SIGNIFICANT CHANGE UP (ref 32–36)
MCV RBC AUTO: 91.5 FL — SIGNIFICANT CHANGE UP (ref 80–100)
PLATELET # BLD AUTO: 499 K/UL — HIGH (ref 150–400)
POTASSIUM SERPL-MCNC: 5.2 MMOL/L — SIGNIFICANT CHANGE UP (ref 3.5–5.3)
POTASSIUM SERPL-SCNC: 5.2 MMOL/L — SIGNIFICANT CHANGE UP (ref 3.5–5.3)
PROT SERPL-MCNC: 6.6 G/DL — SIGNIFICANT CHANGE UP (ref 6–8.3)
RBC # BLD: 2.77 M/UL — LOW (ref 4.2–5.8)
RBC # FLD: 16.2 % — HIGH (ref 10.3–14.5)
SODIUM SERPL-SCNC: 136 MMOL/L — SIGNIFICANT CHANGE UP (ref 135–145)
TACROLIMUS SERPL-MCNC: 10.7 NG/ML — SIGNIFICANT CHANGE UP
VANCOMYCIN TROUGH SERPL-MCNC: 15.8 UG/ML — SIGNIFICANT CHANGE UP (ref 10–20)
WBC # BLD: 15.1 K/UL — HIGH (ref 3.8–10.5)
WBC # FLD AUTO: 15.1 K/UL — HIGH (ref 3.8–10.5)

## 2018-03-20 PROCEDURE — 93505 ENDOMYOCARDIAL BIOPSY: CPT | Mod: 26

## 2018-03-20 PROCEDURE — 99233 SBSQ HOSP IP/OBS HIGH 50: CPT

## 2018-03-20 PROCEDURE — 93451 RIGHT HEART CATH: CPT | Mod: 26,59

## 2018-03-20 PROCEDURE — 71250 CT THORAX DX C-: CPT | Mod: 26

## 2018-03-20 PROCEDURE — 73200 CT UPPER EXTREMITY W/O DYE: CPT | Mod: 26,RT

## 2018-03-20 PROCEDURE — 88313 SPECIAL STAINS GROUP 2: CPT | Mod: 26

## 2018-03-20 PROCEDURE — 88307 TISSUE EXAM BY PATHOLOGIST: CPT | Mod: 26

## 2018-03-20 PROCEDURE — 88341 IMHCHEM/IMCYTCHM EA ADD ANTB: CPT | Mod: 26

## 2018-03-20 PROCEDURE — 99232 SBSQ HOSP IP/OBS MODERATE 35: CPT

## 2018-03-20 PROCEDURE — 88346 IMFLUOR 1ST 1ANTB STAIN PX: CPT | Mod: 26

## 2018-03-20 PROCEDURE — 88342 IMHCHEM/IMCYTCHM 1ST ANTB: CPT | Mod: 26

## 2018-03-20 PROCEDURE — 88350 IMFLUOR EA ADDL 1ANTB STN PX: CPT | Mod: 26

## 2018-03-20 PROCEDURE — 71045 X-RAY EXAM CHEST 1 VIEW: CPT | Mod: 26

## 2018-03-20 RX ORDER — TACROLIMUS 5 MG/1
7 CAPSULE ORAL
Qty: 0 | Refills: 0 | Status: DISCONTINUED | OUTPATIENT
Start: 2018-03-20 | End: 2018-03-21

## 2018-03-20 RX ORDER — VALGANCICLOVIR 450 MG/1
450 TABLET, FILM COATED ORAL
Qty: 0 | Refills: 0 | Status: DISCONTINUED | OUTPATIENT
Start: 2018-03-20 | End: 2018-03-27

## 2018-03-20 RX ORDER — HEPARIN SODIUM 5000 [USP'U]/ML
11800 INJECTION INTRAVENOUS; SUBCUTANEOUS EVERY 12 HOURS
Qty: 0 | Refills: 0 | Status: DISCONTINUED | OUTPATIENT
Start: 2018-03-20 | End: 2018-03-23

## 2018-03-20 RX ORDER — VALGANCICLOVIR 450 MG/1
450 TABLET, FILM COATED ORAL
Qty: 0 | Refills: 0 | Status: DISCONTINUED | OUTPATIENT
Start: 2018-03-20 | End: 2018-03-20

## 2018-03-20 RX ORDER — HEPARIN SODIUM 5000 [USP'U]/ML
11800 INJECTION INTRAVENOUS; SUBCUTANEOUS
Qty: 0 | Refills: 0 | Status: DISCONTINUED | OUTPATIENT
Start: 2018-03-20 | End: 2018-03-20

## 2018-03-20 RX ORDER — FAMOTIDINE 10 MG/ML
20 INJECTION INTRAVENOUS DAILY
Qty: 0 | Refills: 0 | Status: DISCONTINUED | OUTPATIENT
Start: 2018-03-20 | End: 2018-04-10

## 2018-03-20 RX ADMIN — MYCOPHENOLATE MOFETIL 1000 MILLIGRAM(S): 250 CAPSULE ORAL at 20:19

## 2018-03-20 RX ADMIN — Medication 20 MILLIGRAM(S): at 21:30

## 2018-03-20 RX ADMIN — Medication 3 MILLILITER(S): at 12:09

## 2018-03-20 RX ADMIN — Medication 81 MILLIGRAM(S): at 12:10

## 2018-03-20 RX ADMIN — Medication 1 APPLICATION(S): at 06:50

## 2018-03-20 RX ADMIN — Medication 500000 UNIT(S): at 06:50

## 2018-03-20 RX ADMIN — Medication 120 MILLIGRAM(S): at 06:50

## 2018-03-20 RX ADMIN — MYCOPHENOLATE MOFETIL 1000 MILLIGRAM(S): 250 CAPSULE ORAL at 08:00

## 2018-03-20 RX ADMIN — Medication 500000 UNIT(S): at 12:10

## 2018-03-20 RX ADMIN — Medication 3 MILLILITER(S): at 06:50

## 2018-03-20 RX ADMIN — Medication 500000 UNIT(S): at 23:13

## 2018-03-20 RX ADMIN — ATOVAQUONE 1500 MILLIGRAM(S): 750 SUSPENSION ORAL at 12:29

## 2018-03-20 RX ADMIN — Medication 3 MILLILITER(S): at 23:14

## 2018-03-20 RX ADMIN — Medication 100 MILLIGRAM(S): at 21:30

## 2018-03-20 RX ADMIN — Medication 500000 UNIT(S): at 18:06

## 2018-03-20 RX ADMIN — Medication 10 MILLIGRAM(S): at 06:50

## 2018-03-20 RX ADMIN — TACROLIMUS 7 MILLIGRAM(S): 5 CAPSULE ORAL at 08:00

## 2018-03-20 RX ADMIN — TACROLIMUS 7 MILLIGRAM(S): 5 CAPSULE ORAL at 20:00

## 2018-03-20 RX ADMIN — Medication 1 TABLET(S): at 12:10

## 2018-03-20 RX ADMIN — Medication 3 MILLILITER(S): at 18:05

## 2018-03-20 RX ADMIN — Medication 1 APPLICATION(S): at 18:06

## 2018-03-20 RX ADMIN — Medication 10 MILLIGRAM(S): at 18:06

## 2018-03-20 RX ADMIN — HEPARIN SODIUM 18500 UNIT(S): 5000 INJECTION INTRAVENOUS; SUBCUTANEOUS at 22:32

## 2018-03-20 NOTE — PROGRESS NOTE ADULT - SUBJECTIVE AND OBJECTIVE BOX
Podiatry pager #: [phone]    Patient is a 67y old  Male who presents with a chief complaint of Elevated LDH levels (2018 19:36)     INTERVAL HPI/OVERNIGHT EVENTS:  Patient seen and evaluated at bedside.  Pt is resting comfortable in NAD. Denies N/V/F/C.  Pt states has not experienced pain over past few days in foot.     Allergies    No Known Allergies    Intolerances    Vital Signs Last 24 Hrs  T(C): 35.8 (20 Mar 2018 07:48), Max: 36.5 (19 Mar 2018 15:50)  T(F): 96.4 (20 Mar 2018 07:48), Max: 97.7 (19 Mar 2018 15:50)  HR: 99 (20 Mar 2018 12:00) (94 - 102)  BP: 119/78 (20 Mar 2018 12:00) (108/81 - 133/73)  BP(mean): 92 (20 Mar 2018 12:00) (89 - 100)  RR: 18 (20 Mar 2018 11:15) (17 - 19)  SpO2: 99% (20 Mar 2018 11:15) (98% - 100%)    LABS:                      8.2    15.1  )-----------( 499      ( 20 Mar 2018 07:16 )             25.3     03-20    136  |  103  |  65<H>  ----------------------------<  95  5.2   |  22  |  3.79<H>    Ca    8.9      20 Mar 2018 07:16  Mg     2.3     03-20    TPro  6.6  /  Alb  3.2<L>  /  TBili  0.4  /  DBili  x   /  AST  13  /  ALT  20  /  AlkPhos  99  03-20    PTT - ( 19 Mar 2018 15:13 )  PTT:93.2 sec  Urinalysis Basic - ( 18 Mar 2018 15:52 )    Color: Yellow / Appearance: SL Turbid / S.018 / pH: x  Gluc: x / Ketone: Negative  / Bili: Negative / Urobili: Negative   Blood: x / Protein: 100 mg/dL / Nitrite: Negative   Leuk Esterase: Negative / RBC: 0-2 /HPF / WBC 0-2 /HPF   Sq Epi: x / Non Sq Epi: x / Bacteria: Few /HPF    CAPILLARY BLOOD GLUCOSE    POCT Blood Glucose.: 91 mg/dL (20 Mar 2018 11:34)  POCT Blood Glucose.: 96 mg/dL (20 Mar 2018 07:58)  POCT Blood Glucose.: 115 mg/dL (19 Mar 2018 21:54)  POCT Blood Glucose.: 94 mg/dL (19 Mar 2018 19:43)    Lower Extremity Physical Exam:  Vasular: DP/PT 2/4, B/L, CFT <3 seconds B/L, Temperature gradient WNL, B/L.   Neuro: Epicritic sensation intact to the level of the digits, B/L.  Musculoskeletal/Ortho: Severe HAV deformity, R>L. no pain with palpation or range of motion fo R hallux.  Skin: Small area of hyperpigmentation medial 1st MTPJ, R foot. No erythema, no edema    RADIOLOGY & ADDITIONAL TESTS:  no new

## 2018-03-20 NOTE — PROGRESS NOTE ADULT - SUBJECTIVE AND OBJECTIVE BOX
VITAL SIGNS    Telemetry:      Vital Signs Last 24 Hrs  T(C): 35.9 (03-20-18 @ 05:02), Max: 36.5 (03-19-18 @ 15:50)  T(F): 96.7 (03-20-18 @ 05:02), Max: 97.7 (03-19-18 @ 15:50)  HR: 99 (03-20-18 @ 05:02) (96 - 102)  BP: 133/73 (03-20-18 @ 05:02) (108/81 - 133/73)  RR: 19 (03-20-18 @ 05:02) (17 - 19)  SpO2: 100% (03-20-18 @ 05:02) (98% - 100%)                   03-19 @ 07:01  -  03-20 @ 07:00  --------------------------------------------------------  IN: 840 mL / OUT: 625 mL / NET: 215 mL          Daily     Daily         CAPILLARY BLOOD GLUCOSE      POCT Blood Glucose.: 96 mg/dL (20 Mar 2018 07:58)  POCT Blood Glucose.: 115 mg/dL (19 Mar 2018 21:54)  POCT Blood Glucose.: 94 mg/dL (19 Mar 2018 19:43)  POCT Blood Glucose.: 138 mg/dL (19 Mar 2018 11:45)                  Coumadin    [ ] YES          [  ]      NO         Reason:                               PHYSICAL EXAM        Neurology: alert and oriented x 3, nonfocal, no gross deficits  CV :  Sternal Wound :  CDI , Stable  Pacing Wires        [  ]   Settings:                                  Isolated  [  ]  Lungs:  Drains:     MS         [  ] Drainage:                 L Pleural  [  ]  Drainage:                R Pleural  [  ]  Drainage:  Abdomen: soft, nontender, nondistended, positive bowel sounds, last bowel movement   :             Extremities:               acetaminophen   Tablet. 650 milliGRAM(s) Oral every 6 hours PRN  ALBUTerol/ipratropium for Nebulization 3 milliLiter(s) Nebulizer every 6 hours  aspirin enteric coated 81 milliGRAM(s) Oral daily  atovaquone Suspension 1500 milliGRAM(s) Oral daily  Calcium Citrate + Vit D, 315 mg/200 Unit 2 Tablet(s) 2 Tablet(s) Oral two times a day  diltiazem    milliGRAM(s) Oral daily  docusate sodium 100 milliGRAM(s) Oral three times a day  insulin lispro (HumaLOG) corrective regimen sliding scale   SubCutaneous three times a day before meals  insulin lispro (HumaLOG) corrective regimen sliding scale   SubCutaneous at bedtime  multivitamin 1 Tablet(s) Oral daily  mycophenolate mofetil 1000 milliGRAM(s) Oral <User Schedule>  nystatin    Suspension 351872 Unit(s) Oral every 6 hours  pantoprazole    Tablet 40 milliGRAM(s) Oral before breakfast  pravastatin 20 milliGRAM(s) Oral at bedtime  predniSONE   Tablet 10 milliGRAM(s) Oral every 12 hours  silver sulfADIAZINE 1% Cream 1 Application(s) Topical two times a day  tacrolimus 7 milliGRAM(s) Oral <User Schedule>  tacrolimus 6 milliGRAM(s) Oral <User Schedule>  valGANciclovir 450 milliGRAM(s) Oral <User Schedule>                    Physical Therapy Rec:   Home  [  ]   Home w/ PT  [  ]  Rehab  [  ]  Discussed with Cardiothoracic Team at AM rounds. im good, what did everything show    VITAL SIGNS    Telemetry:   nsr st 100    Vital Signs Last 24 Hrs  T(C): 35.9 (03-20-18 @ 05:02), Max: 36.5 (03-19-18 @ 15:50)  T(F): 96.7 (03-20-18 @ 05:02), Max: 97.7 (03-19-18 @ 15:50)  HR: 99 (03-20-18 @ 05:02) (96 - 102)  BP: 133/73 (03-20-18 @ 05:02) (108/81 - 133/73)  RR: 19 (03-20-18 @ 05:02) (17 - 19)  SpO2: 100% (03-20-18 @ 05:02) (98% - 100%)                   03-19 @ 07:01  -  03-20 @ 07:00  --------------------------------------------------------  IN: 840 mL / OUT: 625 mL / NET: 215 mL          Daily     Daily         CAPILLARY BLOOD GLUCOSE      POCT Blood Glucose.: 96 mg/dL (20 Mar 2018 07:58)  POCT Blood Glucose.: 115 mg/dL (19 Mar 2018 21:54)  POCT Blood Glucose.: 94 mg/dL (19 Mar 2018 19:43)  POCT Blood Glucose.: 138 mg/dL (19 Mar 2018 11:45)                  Coumadin    [ ] YES          [x  ]      NO         Reason:                               PHYSICAL EXAM        Neurology: alert and oriented x 3, nonfocal, no gross deficits  CV :  Sternal Wound :  CDI , Stable  Pacing Wires        [  ]   Settings:                                  Isolated  [  ]  Lungs:  Drains:     MS         [  ] Drainage:                 L Pleural  [  ]  Drainage:                R Pleural  [  ]  Drainage:  Abdomen: soft, nontender, nondistended, positive bowel sounds, last bowel movement   :             Extremities:               acetaminophen   Tablet. 650 milliGRAM(s) Oral every 6 hours PRN  ALBUTerol/ipratropium for Nebulization 3 milliLiter(s) Nebulizer every 6 hours  aspirin enteric coated 81 milliGRAM(s) Oral daily  atovaquone Suspension 1500 milliGRAM(s) Oral daily  Calcium Citrate + Vit D, 315 mg/200 Unit 2 Tablet(s) 2 Tablet(s) Oral two times a day  diltiazem    milliGRAM(s) Oral daily  docusate sodium 100 milliGRAM(s) Oral three times a day  insulin lispro (HumaLOG) corrective regimen sliding scale   SubCutaneous three times a day before meals  insulin lispro (HumaLOG) corrective regimen sliding scale   SubCutaneous at bedtime  multivitamin 1 Tablet(s) Oral daily  mycophenolate mofetil 1000 milliGRAM(s) Oral <User Schedule>  nystatin    Suspension [id number] Unit(s) Oral every 6 hours  pantoprazole    Tablet 40 milliGRAM(s) Oral before breakfast  pravastatin 20 milliGRAM(s) Oral at bedtime  predniSONE   Tablet 10 milliGRAM(s) Oral every 12 hours  silver sulfADIAZINE 1% Cream 1 Application(s) Topical two times a day  tacrolimus 7 milliGRAM(s) Oral <User Schedule>  tacrolimus 6 milliGRAM(s) Oral <User Schedule>  valGANciclovir 450 milliGRAM(s) Oral <User Schedule>                    Physical Therapy Rec:   Home  [  ]   Home w/ PT  [  ]  Rehab  [  ]  Discussed with Cardiothoracic Team at AM rounds. im good, what did everything show    VITAL SIGNS    Telemetry:   nsr st 100    Vital Signs Last 24 Hrs  T(C): 35.9 (03-20-18 @ 05:02), Max: 36.5 (03-19-18 @ 15:50)  T(F): 96.7 (03-20-18 @ 05:02), Max: 97.7 (03-19-18 @ 15:50)  HR: 99 (03-20-18 @ 05:02) (96 - 102)  BP: 133/73 (03-20-18 @ 05:02) (108/81 - 133/73)  RR: 19 (03-20-18 @ 05:02) (17 - 19)  SpO2: 100% (03-20-18 @ 05:02) (98% - 100%)                   03-19 @ 07:01  -  03-20 @ 07:00  --------------------------------------------------------  IN: 840 mL / OUT: 625 mL / NET: 215 mL          Daily     Daily         CAPILLARY BLOOD GLUCOSE      POCT Blood Glucose.: 96 mg/dL (20 Mar 2018 07:58)  POCT Blood Glucose.: 115 mg/dL (19 Mar 2018 21:54)  POCT Blood Glucose.: 94 mg/dL (19 Mar 2018 19:43)  POCT Blood Glucose.: 138 mg/dL (19 Mar 2018 11:45)                  Coumadin    [ ] YES          [x  ]      NO         Reason:                               PHYSICAL EXAM        Neurology: alert and oriented x 3, nonfocal, no gross deficits  CV : S1 S2 , RRR   Sternal Wound :  CDI , Stable  Lungs: cta  Abdomen: soft, nontender, nondistended, positive bowel sounds, last bowel movement +, vac to RLQ  :           voiding  Extremities:     trace  edema - calve tenderness          acetaminophen   Tablet. 650 milliGRAM(s) Oral every 6 hours PRN  ALBUTerol/ipratropium for Nebulization 3 milliLiter(s) Nebulizer every 6 hours  aspirin enteric coated 81 milliGRAM(s) Oral daily  atovaquone Suspension 1500 milliGRAM(s) Oral daily  Calcium Citrate + Vit D, 315 mg/200 Unit 2 Tablet(s) 2 Tablet(s) Oral two times a day  diltiazem    milliGRAM(s) Oral daily  docusate sodium 100 milliGRAM(s) Oral three times a day  insulin lispro (HumaLOG) corrective regimen sliding scale   SubCutaneous three times a day before meals  insulin lispro (HumaLOG) corrective regimen sliding scale   SubCutaneous at bedtime  multivitamin 1 Tablet(s) Oral daily  mycophenolate mofetil 1000 milliGRAM(s) Oral <User Schedule>  nystatin    Suspension 988927 Unit(s) Oral every 6 hours  pantoprazole    Tablet 40 milliGRAM(s) Oral before breakfast  pravastatin 20 milliGRAM(s) Oral at bedtime  predniSONE   Tablet 10 milliGRAM(s) Oral every 12 hours  silver sulfADIAZINE 1% Cream 1 Application(s) Topical two times a day  tacrolimus 7 milliGRAM(s) Oral <User Schedule>  tacrolimus 6 milliGRAM(s) Oral <User Schedule>  valGANciclovir 450 milliGRAM(s) Oral <User Schedule>                    Physical Therapy Rec:   Home  [  ]   Home w/ PT  [  ]  Rehab  [  ]  Discussed with Cardiothoracic Team at AM rounds.

## 2018-03-20 NOTE — PROGRESS NOTE ADULT - ASSESSMENT
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   Renal reconsulted for elevated cr. It seems cr has been stable at 0.9 - 1.1 but on 3/14 it was 1.2 and has been steadily rising since.     ddx is wide at this point. Urine lytes suggest a pre renal insult vs early intrinsic. Right ventricular failure could be possibly affecting some of the rise in crt as noted on recent echo. Meanwhile, vanco level was high and tacrolimus vasoconstriction not aiding as well in the recovery. Cryo GN less likely as complements normal. Renal dopplers ruled out renal vein thrombosis.  At this point crt is plateauing and would continue for now to monitor and if possible to keep tacro levels in 10-11 range if rejection is resolved.

## 2018-03-20 NOTE — PROGRESS NOTE ADULT - SUBJECTIVE AND OBJECTIVE BOX
INFECTIOUS DISEASES FOLLOW UP-- Sujey Lujan  [phone]    This is a follow up note for this  67yMale with s/p orthotopic heart transplant hospital course complicated by pseudomonas VAP/HCAP now resolved, deep wound at old LVAD site initially with some purulence currently with wound vac in place, ?embolic event to right middle finger tip with blackened nail, ultrasound today showing possible RUE hematoma, underwnet cardiac catheterization and biopsy.      ROS:  CONSTITUTIONAL:  No fever, good appetite  CARDIOVASCULAR:  No chest pain or palpitations  RESPIRATORY:  No dyspnea  GASTROINTESTINAL:  No nausea, vomiting, diarrhea, or abdominal pain  GENITOURINARY:  No dysuria  NEUROLOGIC:  No headache,     Allergies    No Known Allergies    Intolerances        ANTIBIOTICS/RELEVANT:  antimicrobials  atovaquone Suspension 1500 milliGRAM(s) Oral daily  nystatin    Suspension 045542 Unit(s) Oral every 6 hours  valGANciclovir 450 milliGRAM(s) Oral <User Schedule>    immunologic:  mycophenolate mofetil 1000 milliGRAM(s) Oral <User Schedule>  tacrolimus 7 milliGRAM(s) Oral <User Schedule>    OTHER:  acetaminophen   Tablet. 650 milliGRAM(s) Oral every 6 hours PRN  ALBUTerol/ipratropium for Nebulization 3 milliLiter(s) Nebulizer every 6 hours  aspirin enteric coated 81 milliGRAM(s) Oral daily  Calcium Citrate + Vit D, 315 mg/200 Unit 2 Tablet(s) 2 Tablet(s) Oral two times a day  diltiazem    milliGRAM(s) Oral daily  docusate sodium 100 milliGRAM(s) Oral three times a day  famotidine    Tablet 20 milliGRAM(s) Oral daily  heparin  Injectable 31674 Unit(s) SubCutaneous every 12 hours  insulin lispro (HumaLOG) corrective regimen sliding scale   SubCutaneous three times a day before meals  insulin lispro (HumaLOG) corrective regimen sliding scale   SubCutaneous at bedtime  multivitamin 1 Tablet(s) Oral daily  pravastatin 20 milliGRAM(s) Oral at bedtime  predniSONE   Tablet 10 milliGRAM(s) Oral every 12 hours  silver sulfADIAZINE 1% Cream 1 Application(s) Topical two times a day      Objective:  Vital Signs Last 24 Hrs  T(C): 36.3 (20 Mar 2018 16:05), Max: 36.3 (19 Mar 2018 19:41)  T(F): 97.3 (20 Mar 2018 16:05), Max: 97.3 (19 Mar 2018 19:41)  HR: 101 (20 Mar 2018 16:05) (94 - 102)  BP: 119/68 (20 Mar 2018 16:05) (111/75 - 133/73)  BP(mean): 92 (20 Mar 2018 12:00) (89 - 100)  RR: 18 (20 Mar 2018 16:05) (17 - 19)  SpO2: 99% (20 Mar 2018 16:05) (98% - 100%)    PHYSICAL EXAM:  Constitutional:no acute distress  Eyes:WALT, EOMI  Ear/Nose/Throat: no oral lesions, 	  Respiratory: clear BL except absent at right base  sternal wound healing  left and right upper chest sites- healing  Cardiovascular: S1S2  Gastrointestinal:soft, (+) BS, no tenderness  Extremities:no e/e/c blackened tip right middle finger  right upper forearm with medial swelling,no significant warmth or erythema  No Lymphadenopathy  IV sites not inflammed.    LABS:                        8.2    15.1  )-----------( 499      ( 20 Mar 2018 07:16 )             25.3     03-20    136  |  103  |  65<H>  ----------------------------<  95  5.2   |  22  |  3.79<H>    Ca    8.9      20 Mar 2018 07:16  Mg     2.3     03-20    TPro  6.6  /  Alb  3.2<L>  /  TBili  0.4  /  DBili  x   /  AST  13  /  ALT  20  /  AlkPhos  99  03-20    PTT - ( 19 Mar 2018 15:13 )  PTT:93.2 sec      MICROBIOLOGY:            RECENT CULTURES:  03-14 @ 21:44  .Sputum Sputum  Pseudomonas aeruginosa (Carbapenem Resistant)  Pseudomonas aeruginosa (Carbapenem Resistant)  KAMILA    Rare Pseudomonas aeruginosa (Carbapenem Resistant)  Normal Respiratory [place] present  --      RADIOLOGY & ADDITIONAL STUDIES:  < from: CT Upper Extremity No Cont, Right (03.20.18 @ 15:57) >  IMPRESSION:    Subxiphoid left chest wall fluid collection at the site of epicardial   pacing wires is slightly decreased in size from 3/10/2018.    Right biceps intramuscular hematoma is either unchanged or may have   minimally decreased in size from 3/10/2018 although accurate comparison   is limited as the hematoma was partially imaged on the prior study.    Tubular thin area of high density as described above representing   retained catheter fragment or calcified fibrin sheaths within the left   brachiocephalic vein and extending into the SVC unchanged since the prior   study of March 10,2018.    Unchanged small to moderate right pleural effusion with partial   compressive atelectasis of the right lower and complete right middle lobe   atelectasis.    Subcentimeter lingular pulmonary nodule may have minimally decreased in   size since March 10, 2018 may correspond to a focus of mucoid impacted   distal airway. Continued follow-up is recommended.    < end of copied text >

## 2018-03-20 NOTE — PROGRESS NOTE ADULT - ASSESSMENT
67M -PMH: GIB, LVAD implant, afib, anemia AICD, CHF  2/23/18 s/p  Heart transplant  Intra op acute rejection of graft and Intra-op IABP placed  and removed POD 1 and received plasmaphoresis x 2  2/26 UE +RT IJ inominate SC,cephalic DVT and +lt SC DVT  3/1  cardiac bx low grade rejection  3/2 bronchoscopy   neg   3/4  t/c too floor,  Imipenum for low grade fevers  3/5 Sputum + PSA, ID to see pts/p  Heart transplant 2/23/18   Vac changed to RLQ  3/6 Pt on cefepime  3/8 Cardiac biopsy this am - prograf level - 7- prograf level increased to 8mg po bid  toprol d/c'd,  Cardizem CD 120mg po initiated  high filling pressures noted on right heart cath-lasix 40mg po daily started as per HF  K+= 6 - Bactrim d/c'd - Mepron to start in am for prophylaxis  rpt k+ = 5.2-  daily prograf levels, daily CXR, bid CMP & CBC  plastics consult for ?necrotic right 3rd digit  Cardiac biopsy results pending- depending on biopsy results - HF to adjust prednisone taper  d/c planning  3/9 prednisone 12.5 bid.  Lasix x 1 in am tomorrow, then d/c.   Prograf 10.4, level increased from 7.9 so will need a prograf level tonight at 7 pm, Dr. Mccabe to be notified.  K elevated , if continues to rise may need  kaexalate  Finger with  possible necrosis 3rd R.  Tophus noted by rheumatology,+ crystals  specimen sent.  Hand /plastics consulted, finger  drained, may need further intervention  3/10  WBC to 29   afebrile,  ct abdomen  chest pelvis ordered,   Prograf level 11,  vss   3/11 wbc trending down.   CT Chest/Pelvis:   Small fluid collection inferior to the xiphoid process likely the site of   prior drain.-7 mm nodule in the lingula, new from prior imaging.  No evidence of infection in the abdomen or pelvis.    Hct down , if  decreases further on repeat labs , PRBC to be given  Finger  with less discomfort, no drainage today unable to culture.  Minimal drainage after it was drained on  3/9, culture not sent during   BC & UA neg 3/9 neg to date  Driveline site  cx negative from 3/5  Tacro level 14.2, D/W hf , MAINTAIN CURRENT DOSAGE  Cont IV abx  Cont lovenox for dvt  3/12 Pt with decreased hct, 1uprbc being given.  Plastics f/u , would use silvadine  on finger, no further intervention at this time  3/13 The pt ambulated on stairs.  Prograf level 10  3/12/18 Rpt. duplex: [facility] Duplex Upper Ext Vein Scan, Bilat   Nonocclusive DVT of the R internal jugular vein. Persistent occlusive   DVT in the innominate and subclavian veins. Superficial thrombosis of the   R cephalic vein.  Persistent occlusive DVT L subclavian vein. Nonocclusive DVT of   the L internal jugular and innominate veins. Nonocclusive DVT of the   axillary and brachial vein surrounding a left PICC.    Studies reviewed w Dr Tavarez, will continue lvx 80 bid  3/14 Cardiac Biopsy, resume lovenox tonight  3/14 -   RLL collapse with persistent wbc , for bronch today  with Dr Sutherland, completed  Prograf 12, dose remains at 8mg bid  /72- 125/77  with Cardizem increased yesterday to 180mg daily  PT changed wound vac  3/15 Pt reports feeling better today. K+5.6 overnight, repeat 4.6 this AM, will cont to monitor.    AFB and cultures 3/14  results pending.   Prednisone taper 10 mg today. Midline d/c'd. PIV placed.   Prograf level 15.6 will discuss decrease in Prograf dose with Dr. Annabelle Mccabe  d/c planning anticipate Home Monday 3/19/18  3/16  tacro level 13.4 on decreased prograff dose of 7.  The Midline was d/c , PIV placed.  Prednisone was decreased  on 3/14.  The wbc is down today to 17.  To d/w ID the antibiotic regimen and question if they will be needed long term.  His creat was Up to1.86 today, d/w HF lasix 20 x 1 given,   3/17 Right bunion/blister lanced by podiatry 3/18 Renal consulted for NORMAN , PVR 0cc,  Urine cr, na, k, cl, and bun to calculate feurea,  Cardizem CD decrease to 120 tomorrow, mag oxide discontinued, decreased prograf 6mg bid starting tonight.   3/19 Pt's creat continues to increase, nephrology f/u  Venous renal US ordered.  Anticoagulation changed to 25716 of sq heparin, ptt 79.  Repeat echo and repeat UE venous duplex  today  3/20 Renal US:< from: US Duplex Kidneys (03.19.18 @ 15:36) >  No evidence of renal vein thrombosis.   Echo: < from: Transthoracic Echocardiogram (03.19.18 @ 13:41) >  1. Normal left ventricular internal dimensions and wall  thicknesses.  2. Normal left ventricular systolic function. No segmental  wall motion abnormalities.  3. Normal right ventricular size with midlly decreased  right ventricular systolic function. TAPSE 1.1cm.  4. Estimated pulmonary artery systolic pressure equals 36  mm Hg, assuming right atrial pressure equals 8 mm Hg,  consistent with borderline pulmonary pressures.  Duplex: : [facility] Duplex Upper Ext Vein Scan, Bilat (03.19.18 @ 16:17) >  Impression: On this examination there is partially occlusive thrombus in   the right internal jugular vein and occlusive thrombus in the right   subclavian vein.  On the left, there is nonocclusive thrombus in the internal jugular vein   and partially occlusive thrombus in the left axillary vein.  A fluid collection is again identified in the right chest wall andthere   is a probable hematoma in the right upper arm.  The pt's renal function remains elevated, for right heart cath and biopsy today.  He will also need a CT  of the RUE and chest wall 2/2 US/Duplex findings.  Heparin on hold, will resume tonight at 800units less.  Prograff dosage adjusted tp  7 qam, 6 qpm yesterday. 67M -PMH: GIB, LVAD implant, afib, anemia AICD, CHF  2/23/18 s/p  Heart transplant  Intra op acute rejection of graft and Intra-op IABP placed  and removed POD 1 and received plasmaphoresis x 2  2/26 UE +RT IJ inominate SC,cephalic DVT and +lt SC DVT  3/1  cardiac bx low grade rejection  3/2 bronchoscopy   neg   3/4  t/c too floor,  Imipenum for low grade fevers  3/5 Sputum + PSA, ID to see pts/p  Heart transplant 2/23/18   Vac changed to RLQ  3/6 Pt on cefepime  3/8 Cardiac biopsy this am - prograf level - 7- prograf level increased to 8mg po bid  toprol d/c'd,  Cardizem CD 120mg po initiated  high filling pressures noted on right heart cath-lasix 40mg po daily started as per HF  K+= 6 - Bactrim d/c'd - Mepron to start in am for prophylaxis  rpt k+ = 5.2-  daily prograf levels, daily CXR, bid CMP & CBC  plastics consult for ?necrotic right 3rd digit  Cardiac biopsy results pending- depending on biopsy results - HF to adjust prednisone taper  d/c planning  3/9 prednisone 12.5 bid.  Lasix x 1 in am tomorrow, then d/c.   Prograf 10.4, level increased from 7.9 so will need a prograf level tonight at 7 pm, Dr. Mccabe to be notified.  K elevated , if continues to rise may need  kaexalate  Finger with  possible necrosis 3rd R.  Tophus noted by rheumatology,+ crystals  specimen sent.  Hand /plastics consulted, finger  drained, may need further intervention  3/10  WBC to 29   afebrile,  ct abdomen  chest pelvis ordered,   Prograf level 11,  vss   3/11 wbc trending down.   CT Chest/Pelvis:   Small fluid collection inferior to the xiphoid process likely the site of   prior drain.-7 mm nodule in the lingula, new from prior imaging.  No evidence of infection in the abdomen or pelvis.    Hct down , if  decreases further on repeat labs , PRBC to be given  Finger  with less discomfort, no drainage today unable to culture.  Minimal drainage after it was drained on  3/9, culture not sent during   BC & UA neg 3/9 neg to date  Driveline site  cx negative from 3/5  Tacro level 14.2, D/W hf , MAINTAIN CURRENT DOSAGE  Cont IV abx  Cont lovenox for dvt  3/12 Pt with decreased hct, 1uprbc being given.  Plastics f/u , would use silvadine  on finger, no further intervention at this time  3/13 The pt ambulated on stairs.  Prograf level 10  3/12/18 Rpt. duplex: [facility] Duplex Upper Ext Vein Scan, Bilat   Nonocclusive DVT of the R internal jugular vein. Persistent occlusive   DVT in the innominate and subclavian veins. Superficial thrombosis of the   R cephalic vein.  Persistent occlusive DVT L subclavian vein. Nonocclusive DVT of   the L internal jugular and innominate veins. Nonocclusive DVT of the   axillary and brachial vein surrounding a left PICC.    Studies reviewed w Dr Tavarez, will continue lvx 80 bid  3/14 Cardiac Biopsy, resume lovenox tonight  3/14 -   RLL collapse with persistent wbc , for bronch today  with Dr Sutherland, completed  Prograf 12, dose remains at 8mg bid  /72- 125/77  with Cardizem increased yesterday to 180mg daily  PT changed wound vac  3/15 Pt reports feeling better today. K+5.6 overnight, repeat 4.6 this AM, will cont to monitor.    AFB and cultures 3/14  results pending.   Prednisone taper 10 mg today. Midline d/c'd. PIV placed.   Prograf level 15.6 will discuss decrease in Prograf dose with Dr. Annabelle Mccabe  d/c planning anticipate Home Monday 3/19/18  3/16  tacro level 13.4 on decreased prograff dose of 7.  The Midline was d/c , PIV placed.  Prednisone was decreased  on 3/14.  The wbc is down today to 17.  To d/w ID the antibiotic regimen and question if they will be needed long term.  His creat was Up to1.86 today, d/w HF lasix 20 x 1 given,   3/17 Right bunion/blister lanced by podiatry 3/18 Renal consulted for NORMAN , PVR 0cc,  Urine cr, na, k, cl, and bun to calculate feurea,  Cardizem CD decrease to 120 tomorrow, mag oxide discontinued, decreased prograf 6mg bid starting tonight.   3/19 Pt's creat continues to increase, nephrology f/u  Venous renal US ordered.  Anticoagulation changed to 78234 of sq heparin, ptt 79.  Repeat echo and repeat UE venous duplex  today  3/20 Renal US:< from: US Duplex Kidneys (03.19.18 @ 15:36) >  No evidence of renal vein thrombosis.   Echo: < from: Transthoracic Echocardiogram (03.19.18 @ 13:41) >  1. Normal left ventricular internal dimensions and wall  thicknesses.  2. Normal left ventricular systolic function. No segmental  wall motion abnormalities.  3. Normal right ventricular size with midlly decreased  right ventricular systolic function. TAPSE 1.1cm.  4. Estimated pulmonary artery systolic pressure equals 36  mm Hg, assuming right atrial pressure equals 8 mm Hg,  consistent with borderline pulmonary pressures.  Duplex: : [facility] Duplex Upper Ext Vein Scan, Bilat (03.19.18 @ 16:17) >  Impression: On this examination there is partially occlusive thrombus in   the right internal jugular vein and occlusive thrombus in the right   subclavian vein.  On the left, there is nonocclusive thrombus in the internal jugular vein   and partially occlusive thrombus in the left axillary vein.  A fluid collection is again identified in the right chest wall andthere   is a probable hematoma in the right upper arm.    The pt's renal function remains elevated, for right heart cath and biopsy today.  He will also need a CT  of the RUE and chest wall 2/2 US/Duplex findings.  Heparin on hold, will resume tonight at 800units less.  Prograff dosage adjusted tp  7 qam, 6 qpm yesterday.  Now level is 10.7 will go to prograff 7 bid  Pepcid or Renatadine , d/c protonix 2/2 renal fx.  To d/w ID resuming dapto now that vanco level is drifting down.  Resume sq heparin at lower dose , TID instead of bid, staring 10pm tonight

## 2018-03-20 NOTE — PROGRESS NOTE ADULT - ASSESSMENT
68 y/o M w/ R foot medial 1st MTPJ pain  - Pt seen and examined  - Pt denies change to pain  - No acute SOI  - Suspect possible gout, however, pain resolved now  - no local wound care to feet needed at this time  - ambulate as tolerated in regular shoe gear   - Sween and discussed with attending who is in agreement  - Podiatry to sign off, please reconsult as needed  - Thank you for the consult

## 2018-03-20 NOTE — PROGRESS NOTE ADULT - ASSESSMENT
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 Found to have b/l IJ/subclavian thrombi as well.          	   Antimicrobial prophylaxis:  Intermediate risk CMV - Adjust Valcyte for NORMAN to 450 mg daily just TWICE weekly. Last dose on Saturday so will schedule for Tues/Sat.  Intermediate risk Toxo - continue Mepron 1500mg PO daily with food.  PCP - continue Mepron 1500mg PO daily  Thrush - continue Nystatin S/S 5 mL Qid      - Pseudomonas in sputum - course of cefepime completed 3/15/18  - silver sulfadizaine dressings per plastics for R 3rd digit.   - HCV viral load and PCR per protocol. weekly  Therapy to be initiated as outpatient.  - old LVAD site wound with wound vac in place and slowly granulating without purulence  - RUE forearm- Ct read as a hematoma ? site of old A-line but does not appear erythematous or with streaking    Would start Daptomycin 500mg/day    Gary Lujan MD  [phone]  After 5pm/weekends [phone]

## 2018-03-20 NOTE — PROGRESS NOTE ADULT - ASSESSMENT
Assessment:  1.  Bilateral, extensive upper extremity DVT  - Currently on lovenox  - Swelling of RUE controlled with elevation and ace wrap  2.  Heart Transplant  3.  Acute renal failure  4.  Right 3rf digit ischemia - stable  5.  Right foot bunion      Plan  1.  Hematoma seen on duplex (and prior duplex) with some arm swelling that is clinically improving with compression - would recommend CT noncontrast of the RUE to eval for size of hematoma  2.  PTT was above 90 yesterday.  Would decrease heparin dosing by 10mg/kg BID (so decrease dose by 800).  Recheck PTT 6 hours after 2nd heparin dose  - goal will be a PTT of closer to 50  3.  The arm should always be wrapped with ACE and elevated for now  4.  R arm is clinically improving but must be wary of compartment syndrome- which I do not think he has - as there is no neuro or arterial deficit and motion is intact.         Daysi [id number]

## 2018-03-20 NOTE — PROGRESS NOTE ADULT - SUBJECTIVE AND OBJECTIVE BOX
VASCULAR MEDICINE                         CC:  UEDVT    INTERVAL HISTORY:    Creatinine continues to uptrend.  No CP or SOB.  Sitting in chair. R arm without symptoms, currently wrapped.  He states that the arm is feeling better.  Innominate vein dvt bilaterally has resolved.  Hematoma in R arm seen on prior ultrasound as well.      Allergies    No Known Allergies    MEDICATIONS  (STANDING):  ALBUTerol/ipratropium for Nebulization 3 milliLiter(s) Nebulizer every 6 hours  aspirin enteric coated 81 milliGRAM(s) Oral daily  atovaquone Suspension 1500 milliGRAM(s) Oral daily  Calcium Citrate + Vit D, 315 mg/200 Unit 2 Tablet(s) 2 Tablet(s) Oral two times a day  diltiazem    milliGRAM(s) Oral daily  docusate sodium 100 milliGRAM(s) Oral three times a day  insulin lispro (HumaLOG) corrective regimen sliding scale   SubCutaneous three times a day before meals  insulin lispro (HumaLOG) corrective regimen sliding scale   SubCutaneous at bedtime  multivitamin 1 Tablet(s) Oral daily  mycophenolate mofetil 1000 milliGRAM(s) Oral <User Schedule>  nystatin    Suspension 163631 Unit(s) Oral every 6 hours  pantoprazole    Tablet 40 milliGRAM(s) Oral before breakfast  pravastatin 20 milliGRAM(s) Oral at bedtime  predniSONE   Tablet 10 milliGRAM(s) Oral every 12 hours  silver sulfADIAZINE 1% Cream 1 Application(s) Topical two times a day  tacrolimus 7 milliGRAM(s) Oral <User Schedule>  tacrolimus 6 milliGRAM(s) Oral <User Schedule>  valGANciclovir 450 milliGRAM(s) Oral <User Schedule>    MEDICATIONS  (PRN):  acetaminophen   Tablet. 650 milliGRAM(s) Oral every 6 hours PRN Mild Pain (1 - 3)  SOCIAL HISTORY:  unchanged    REVIEW OF SYSTEMS:  CONSTITUTIONAL: No fever, weight loss, or fatigue  EYES: No eye pain, visual disturbances, or discharge  ENMT:  No difficulty hearing, tinnitus, vertigo; No sinus or throat pain  NECK: No pain or stiffness  RESPIRATORY: No cough, wheezing, chills or hemoptysis; No Shortness of Breath  CARDIOVASCULAR: No chest pain, palpitations, passing out, dizziness, or leg swelling  GASTROINTESTINAL: No abdominal or epigastric pain. No nausea, vomiting, or hematemesis; No diarrhea or constipation. No melena or hematochezia.  GENITOURINARY: No dysuria, frequency, hematuria, or incontinence  NEUROLOGICAL: No headaches, memory loss, loss of strength, numbness, or tremors  SKIN: No itching, burning, rashes, or lesions   LYMPH Nodes: No enlarged glands  ENDOCRINE: No heat or cold intolerance; No hair loss  MUSCULOSKELETAL: No joint pain or swelling; No muscle, back, or extremity pain  PSYCHIATRIC: No depression, anxiety, mood swings, or difficulty sleeping  HEME/LYMPH: No easy bruising, or bleeding gums  ALLERY AND IMMUNOLOGIC: No hives or eczema	    [x ] All others negative	  [ ] Unable to obtain  ICU Vital Signs Last 24 Hrs  T(C): 35.8 (20 Mar 2018 07:48), Max: 36.5 (19 Mar 2018 15:50)  T(F): 96.4 (20 Mar 2018 07:48), Max: 97.7 (19 Mar 2018 15:50)  HR: 94 (20 Mar 2018 07:48) (94 - 102)  BP: 120/78 (20 Mar 2018 07:48) (108/81 - 133/73)  BP(mean): 93 (20 Mar 2018 07:48) (89 - 94)  ABP: --  ABP(mean): --  RR: 18 (20 Mar 2018 07:48) (17 - 19)  SpO2: 99% (20 Mar 2018 07:48) (98% - 100%)    Appearance: Normal	  HEENT:   Normal oral mucosa, PERRL, EOMI	  Lymphatic: No lymphadenopathy  Cardiovascular: Normal S1 S2   Respiratory: Lungs clear to auscultation	  Psychiatry: A & O x 3, Mood & affect appropriate  Gastrointestinal:  Soft, Non-tender, + BS	  Skin: No rashes, No ecchymoses, No cyanosis	  Neurologic: Non-focal  Extremities:  Right upper extremity bicep area fullness and edema which is stable.  Forearm without edema.  Hands are warm.  R 3rd digit is wrapped.                           8.2    15.1  )-----------( 499      ( 20 Mar 2018 07:16 )             25.3   03-20    136  |  103  |  65<H>  ----------------------------<  95  5.2   |  22  |  3.79<H>    Ca    8.9      20 Mar 2018 07:16  Mg     2.3     03-20    TPro  6.6  /  Alb  3.2<L>  /  TBili  0.4  /  DBili  x   /  AST  13  /  ALT  20  /  AlkPhos  99  03-20

## 2018-03-20 NOTE — PROGRESS NOTE ADULT - SUBJECTIVE AND OBJECTIVE BOX
Subjective: No complaints. He feels well today. Improved shortness of breath and able to walk in halls.     Medications:  acetaminophen   Tablet. 650 milliGRAM(s) Oral every 6 hours PRN  ALBUTerol/ipratropium for Nebulization 3 milliLiter(s) Nebulizer every 6 hours  aspirin enteric coated 81 milliGRAM(s) Oral daily  atovaquone Suspension 1500 milliGRAM(s) Oral daily  Calcium Citrate + Vit D, 315 mg/200 Unit 2 Tablet(s) 2 Tablet(s) Oral two times a day  diltiazem    milliGRAM(s) Oral daily  docusate sodium 100 milliGRAM(s) Oral three times a day  famotidine    Tablet 20 milliGRAM(s) Oral daily  heparin  Injectable 23694 Unit(s) SubCutaneous every 12 hours  insulin lispro (HumaLOG) corrective regimen sliding scale   SubCutaneous three times a day before meals  insulin lispro (HumaLOG) corrective regimen sliding scale   SubCutaneous at bedtime  multivitamin 1 Tablet(s) Oral daily  mycophenolate mofetil 1000 milliGRAM(s) Oral <User Schedule>  nystatin    Suspension 960128 Unit(s) Oral every 6 hours  pravastatin 20 milliGRAM(s) Oral at bedtime  predniSONE   Tablet 10 milliGRAM(s) Oral every 12 hours  silver sulfADIAZINE 1% Cream 1 Application(s) Topical two times a day  tacrolimus 7 milliGRAM(s) Oral <User Schedule>  valGANciclovir 450 milliGRAM(s) Oral <User Schedule>      Physical Exam:    Vital Signs Last 24 Hrs  T(C): 36.3 (20 Mar 2018 16:05), Max: 36.3 (20 Mar 2018 16:05)  T(F): 97.3 (20 Mar 2018 16:05), Max: 97.3 (20 Mar 2018 16:05)  HR: 101 (20 Mar 2018 16:05) (94 - 101)  BP: 119/68 (20 Mar 2018 16:05) (115/60 - 133/73)  BP(mean): 92 (20 Mar 2018 12:00) (92 - 100)  RR: 18 (20 Mar 2018 16:05) (17 - 19)  SpO2: 99% (20 Mar 2018 16:05) (99% - 100%)    Daily Weight in k.1 (20 Mar 2018 12:48)    I&O's Summary    19 Mar 2018 07:01  -  20 Mar 2018 07:00  --------------------------------------------------------  IN: 840 mL / OUT: 625 mL / NET: 215 mL    20 Mar 2018 07:  -  20 Mar 2018 20:41  --------------------------------------------------------  IN: 590 mL / OUT: 150 mL / NET: 440 mL    General: No distress. Comfortable.  HEENT: EOM intact.  Neck: Neck supple. JVP not elevated. No masses  Chest: Clear to auscultation bilaterally  CV: RRR. Normal S1 and S2. No murmurs, rub, or gallops. Radial pulses normal.  Abdomen: Soft, non-distended, non-tender  Skin: No rashes or skin breakdown  Neurology: Alert and oriented times three. Sensation intact  Psych: Affect normal    Labs:                        8.2    15.1  )-----------( 499      ( 20 Mar 2018 07:16 )             25.3     03-20    136  |  103  |  65<H>  ----------------------------<  95  5.2   |  22  |  3.79<H>    Ca    8.9      20 Mar 2018 07:16  Mg     2.3     -    TPro  6.6  /  Alb  3.2<L>  /  TBili  0.4  /  DBili  x   /  AST  13  /  ALT  20  /  AlkPhos  99  03-20    PTT - ( 19 Mar 2018 15:13 )  PTT:93.2 sec

## 2018-03-20 NOTE — PROGRESS NOTE ADULT - PROBLEM SELECTOR PLAN 1
As stated above for the ddx.  Tacro levels are fluctuating and will monitor for now and if possible to keep 10-11 range   Vanco by level only and can lead to cast nephropathy if levels are high in such cases.  If possible, dc PPI and use H2 blocker but if strong indication- continue PPI

## 2018-03-20 NOTE — PROGRESS NOTE ADULT - ASSESSMENT
Mr. Baez is a 67 year old man with ACC/AHA stage D chronic systolic heart failure due to NICM s/p HM2 LVAD 6/17 c/b pump thrombosis now s/p heart transplant on 2/23 (CMV D-/R+ and Toxo D-/R+), with post-op course c/b primary graft dysfunction, initially thought to be immune-mediated due to positive B-cell flow (negative CDC cross match) and received plasmapharesis/IVIg x2 with improvement in LV function. His course has been complicated by bilateral IJ/subclavian thrombi. He currently has acute renal failure of unclear etiology. However, he does not have clear evidence of allograft dysfunction or low output on exam.     Plan discussed with 2 Sánchez NP team and in multidisciplinary rounds.     #Heart Transplant  EMB #1: 1R/1A, AMR 1. DSA negative.  EMB #2: 1R/1A, AMR 1. DSA negative.  EMB #3: 1R/1A. stable AMR1. No DSA. RA 12, PA 40/16/27, PCW 11, PA 67%, CO/CI 6.8/3.6. TTE with mild RV enlargement and dysfunction.  EMB #4: 1R/2A with RA 10, PA 39/18, Wedge 18, CO/CI 6/3.2. No effusion on TTE.     # Immunosuppression:  Tacrolimus -  Will give 7 mg BID. Tacro level 10.7 this morning from 13.2, which is within target trough level 12-14.   CellCept - continue 1000 mg PO BID.   Steroids - continue prednisone 10 mg Q12     #Hypertension  - Continue cardizem 120 mg daily.   	  # Antimicrobial prophylaxis:  Intermediate risk CMV - Valcyte for NORMAN to 450 mg daily just TWICE weekly.   Intermediate risk Toxo - continue Mepron 1500mg PO daily with food.  PCP - continue Mepron 1500mg PO daily  Thrush - continue Nystatin S/S 5 mL QID    # Bilateral IJ/Subclavian thrombi - unchanged on f/u U/S 3/12  - lovenox switched to heparin 71714 units subcutaneous Q12 (250 units/kg Q12) per vasc cards recs in setting of renal function. Please hold 3/19 PM and 3/20 AM dose for RHC.   - CHERIE negative    #Acute renal failure of unclear etiology  - Vanco stopped.   - He responded to diuretics given on Friday. No further diuresis.     # CAV PPx  - Continue ECASA 81mg PO daily  - Continue pravastatin 20mg PO QHS    # ID   - Pseudomonas in sputum - course of cefepime completed 3/15  - DLES improved and vancomycin stopped given high trough with NORMAN. Per ID, will redose with daptomycin depending upon repeat vanc level this morning.   - silver sulfadizaine dressings per plastics for R 3rd digit.   - HCV viral load and PCR per protocol. Therapy to be initiated as outpatient by Dr. Thomas (Hepatology)  - Transplant ID following    #endo  - BG currently well controlled, ranging 103-157  - SSI AC & HS    # Additional prevention:  - Citracal + D 2 tabs PO BID  - multivitamin 1 tab daily  - Stress ulcer ppx while on steroids: will change pantoprazole to ranitidine per renal recommendations.  - Mag oxide discontinued currently. Mag 2.3 this AM.    #dispo  - Ongoing work with PT for ongoing exercise tolerance prior to d/c

## 2018-03-21 LAB
ALBUMIN SERPL ELPH-MCNC: 3.4 G/DL — SIGNIFICANT CHANGE UP (ref 3.3–5)
ALP SERPL-CCNC: 99 U/L — SIGNIFICANT CHANGE UP (ref 40–120)
ALT FLD-CCNC: 20 U/L RC — SIGNIFICANT CHANGE UP (ref 10–45)
ANION GAP SERPL CALC-SCNC: 12 MMOL/L — SIGNIFICANT CHANGE UP (ref 5–17)
ANION GAP SERPL CALC-SCNC: 14 MMOL/L — SIGNIFICANT CHANGE UP (ref 5–17)
ANION GAP SERPL CALC-SCNC: 18 MMOL/L — HIGH (ref 5–17)
APTT BLD: 71.4 SEC — HIGH (ref 27.5–37.4)
APTT BLD: 76.4 SEC — HIGH (ref 27.5–37.4)
AST SERPL-CCNC: 16 U/L — SIGNIFICANT CHANGE UP (ref 10–40)
BILIRUB SERPL-MCNC: 0.4 MG/DL — SIGNIFICANT CHANGE UP (ref 0.2–1.2)
BUN SERPL-MCNC: 59 MG/DL — HIGH (ref 7–23)
BUN SERPL-MCNC: 62 MG/DL — HIGH (ref 7–23)
BUN SERPL-MCNC: 65 MG/DL — HIGH (ref 7–23)
CALCIUM SERPL-MCNC: 8.8 MG/DL — SIGNIFICANT CHANGE UP (ref 8.4–10.5)
CALCIUM SERPL-MCNC: 8.9 MG/DL — SIGNIFICANT CHANGE UP (ref 8.4–10.5)
CALCIUM SERPL-MCNC: 9 MG/DL — SIGNIFICANT CHANGE UP (ref 8.4–10.5)
CHLORIDE SERPL-SCNC: 105 MMOL/L — SIGNIFICANT CHANGE UP (ref 96–108)
CHLORIDE SERPL-SCNC: 106 MMOL/L — SIGNIFICANT CHANGE UP (ref 96–108)
CHLORIDE SERPL-SCNC: 106 MMOL/L — SIGNIFICANT CHANGE UP (ref 96–108)
CO2 SERPL-SCNC: 17 MMOL/L — LOW (ref 22–31)
CO2 SERPL-SCNC: 19 MMOL/L — LOW (ref 22–31)
CO2 SERPL-SCNC: 21 MMOL/L — LOW (ref 22–31)
CREAT SERPL-MCNC: 3.16 MG/DL — HIGH (ref 0.5–1.3)
CREAT SERPL-MCNC: 3.4 MG/DL — HIGH (ref 0.5–1.3)
CREAT SERPL-MCNC: 3.46 MG/DL — HIGH (ref 0.5–1.3)
GLUCOSE BLDC GLUCOMTR-MCNC: 106 MG/DL — HIGH (ref 70–99)
GLUCOSE BLDC GLUCOMTR-MCNC: 108 MG/DL — HIGH (ref 70–99)
GLUCOSE BLDC GLUCOMTR-MCNC: 110 MG/DL — HIGH (ref 70–99)
GLUCOSE BLDC GLUCOMTR-MCNC: 144 MG/DL — HIGH (ref 70–99)
GLUCOSE BLDC GLUCOMTR-MCNC: 96 MG/DL — SIGNIFICANT CHANGE UP (ref 70–99)
GLUCOSE SERPL-MCNC: 104 MG/DL — HIGH (ref 70–99)
GLUCOSE SERPL-MCNC: 123 MG/DL — HIGH (ref 70–99)
GLUCOSE SERPL-MCNC: 136 MG/DL — HIGH (ref 70–99)
HCT VFR BLD CALC: 26.2 % — LOW (ref 39–50)
HGB BLD-MCNC: 8.6 G/DL — LOW (ref 13–17)
INR BLD: 1.18 RATIO — HIGH (ref 0.88–1.16)
INTERPRETATION SERPL IFE-IMP: SIGNIFICANT CHANGE UP
MAGNESIUM SERPL-MCNC: 2.3 MG/DL — SIGNIFICANT CHANGE UP (ref 1.6–2.6)
MCHC RBC-ENTMCNC: 29.9 PG — SIGNIFICANT CHANGE UP (ref 27–34)
MCHC RBC-ENTMCNC: 32.7 GM/DL — SIGNIFICANT CHANGE UP (ref 32–36)
MCV RBC AUTO: 91.4 FL — SIGNIFICANT CHANGE UP (ref 80–100)
PLATELET # BLD AUTO: 499 K/UL — HIGH (ref 150–400)
POTASSIUM SERPL-MCNC: 5.1 MMOL/L — SIGNIFICANT CHANGE UP (ref 3.5–5.3)
POTASSIUM SERPL-MCNC: 5.5 MMOL/L — HIGH (ref 3.5–5.3)
POTASSIUM SERPL-MCNC: 5.6 MMOL/L — HIGH (ref 3.5–5.3)
POTASSIUM SERPL-MCNC: 5.8 MMOL/L — HIGH (ref 3.5–5.3)
POTASSIUM SERPL-SCNC: 5.1 MMOL/L — SIGNIFICANT CHANGE UP (ref 3.5–5.3)
POTASSIUM SERPL-SCNC: 5.5 MMOL/L — HIGH (ref 3.5–5.3)
POTASSIUM SERPL-SCNC: 5.6 MMOL/L — HIGH (ref 3.5–5.3)
POTASSIUM SERPL-SCNC: 5.8 MMOL/L — HIGH (ref 3.5–5.3)
PROT SERPL-MCNC: 6.8 G/DL — SIGNIFICANT CHANGE UP (ref 6–8.3)
PROTHROM AB SERPL-ACNC: 12.8 SEC — HIGH (ref 9.8–12.7)
RBC # BLD: 2.87 M/UL — LOW (ref 4.2–5.8)
RBC # FLD: 16.3 % — HIGH (ref 10.3–14.5)
SODIUM SERPL-SCNC: 138 MMOL/L — SIGNIFICANT CHANGE UP (ref 135–145)
SODIUM SERPL-SCNC: 139 MMOL/L — SIGNIFICANT CHANGE UP (ref 135–145)
SODIUM SERPL-SCNC: 141 MMOL/L — SIGNIFICANT CHANGE UP (ref 135–145)
TACROLIMUS SERPL-MCNC: 10.9 NG/ML — SIGNIFICANT CHANGE UP
WBC # BLD: 15.6 K/UL — HIGH (ref 3.8–10.5)
WBC # FLD AUTO: 15.6 K/UL — HIGH (ref 3.8–10.5)

## 2018-03-21 PROCEDURE — 71045 X-RAY EXAM CHEST 1 VIEW: CPT | Mod: 26

## 2018-03-21 PROCEDURE — 99233 SBSQ HOSP IP/OBS HIGH 50: CPT

## 2018-03-21 PROCEDURE — 93308 TTE F-UP OR LMTD: CPT | Mod: 26

## 2018-03-21 PROCEDURE — 93321 DOPPLER ECHO F-UP/LMTD STD: CPT | Mod: 26

## 2018-03-21 RX ORDER — FUROSEMIDE 40 MG
20 TABLET ORAL ONCE
Qty: 0 | Refills: 0 | Status: COMPLETED | OUTPATIENT
Start: 2018-03-21 | End: 2018-03-21

## 2018-03-21 RX ORDER — TACROLIMUS 5 MG/1
8 CAPSULE ORAL
Qty: 0 | Refills: 0 | Status: DISCONTINUED | OUTPATIENT
Start: 2018-03-21 | End: 2018-03-23

## 2018-03-21 RX ORDER — SODIUM POLYSTYRENE SULFONATE 4.1 MEQ/G
30 POWDER, FOR SUSPENSION ORAL ONCE
Qty: 0 | Refills: 0 | Status: COMPLETED | OUTPATIENT
Start: 2018-03-21 | End: 2018-03-21

## 2018-03-21 RX ORDER — SODIUM POLYSTYRENE SULFONATE 4.1 MEQ/G
15 POWDER, FOR SUSPENSION ORAL ONCE
Qty: 0 | Refills: 0 | Status: COMPLETED | OUTPATIENT
Start: 2018-03-21 | End: 2018-03-21

## 2018-03-21 RX ORDER — TACROLIMUS 5 MG/1
8 CAPSULE ORAL
Qty: 0 | Refills: 0 | Status: DISCONTINUED | OUTPATIENT
Start: 2018-03-21 | End: 2018-03-21

## 2018-03-21 RX ORDER — DAPTOMYCIN 500 MG/10ML
500 INJECTION, POWDER, LYOPHILIZED, FOR SOLUTION INTRAVENOUS EVERY 24 HOURS
Qty: 0 | Refills: 0 | Status: COMPLETED | OUTPATIENT
Start: 2018-03-21 | End: 2018-03-21

## 2018-03-21 RX ORDER — SODIUM BICARBONATE 1 MEQ/ML
650 SYRINGE (ML) INTRAVENOUS THREE TIMES A DAY
Qty: 0 | Refills: 0 | Status: DISCONTINUED | OUTPATIENT
Start: 2018-03-21 | End: 2018-04-10

## 2018-03-21 RX ADMIN — Medication 3 MILLILITER(S): at 06:53

## 2018-03-21 RX ADMIN — Medication 650 MILLIGRAM(S): at 13:00

## 2018-03-21 RX ADMIN — Medication 650 MILLIGRAM(S): at 12:22

## 2018-03-21 RX ADMIN — MYCOPHENOLATE MOFETIL 1000 MILLIGRAM(S): 250 CAPSULE ORAL at 08:01

## 2018-03-21 RX ADMIN — MYCOPHENOLATE MOFETIL 1000 MILLIGRAM(S): 250 CAPSULE ORAL at 19:59

## 2018-03-21 RX ADMIN — Medication 100 MILLIGRAM(S): at 06:53

## 2018-03-21 RX ADMIN — Medication 3 MILLILITER(S): at 22:51

## 2018-03-21 RX ADMIN — Medication 20 MILLIGRAM(S): at 17:09

## 2018-03-21 RX ADMIN — SODIUM POLYSTYRENE SULFONATE 30 GRAM(S): 4.1 POWDER, FOR SUSPENSION ORAL at 12:16

## 2018-03-21 RX ADMIN — FAMOTIDINE 20 MILLIGRAM(S): 10 INJECTION INTRAVENOUS at 12:00

## 2018-03-21 RX ADMIN — Medication 650 MILLIGRAM(S): at 14:34

## 2018-03-21 RX ADMIN — ATOVAQUONE 1500 MILLIGRAM(S): 750 SUSPENSION ORAL at 12:00

## 2018-03-21 RX ADMIN — Medication 81 MILLIGRAM(S): at 12:00

## 2018-03-21 RX ADMIN — Medication 50 MILLIGRAM(S): at 18:02

## 2018-03-21 RX ADMIN — HEPARIN SODIUM 11800 UNIT(S): 5000 INJECTION INTRAVENOUS; SUBCUTANEOUS at 10:03

## 2018-03-21 RX ADMIN — Medication 10 MILLIGRAM(S): at 06:53

## 2018-03-21 RX ADMIN — Medication 20 MILLIGRAM(S): at 22:51

## 2018-03-21 RX ADMIN — DAPTOMYCIN 120 MILLIGRAM(S): 500 INJECTION, POWDER, LYOPHILIZED, FOR SOLUTION INTRAVENOUS at 17:29

## 2018-03-21 RX ADMIN — Medication 120 MILLIGRAM(S): at 06:53

## 2018-03-21 RX ADMIN — Medication 650 MILLIGRAM(S): at 22:50

## 2018-03-21 RX ADMIN — Medication 3 MILLILITER(S): at 12:00

## 2018-03-21 RX ADMIN — Medication 500000 UNIT(S): at 22:51

## 2018-03-21 RX ADMIN — Medication 1 TABLET(S): at 12:00

## 2018-03-21 RX ADMIN — Medication 1 APPLICATION(S): at 06:53

## 2018-03-21 RX ADMIN — Medication 100 MILLIGRAM(S): at 14:34

## 2018-03-21 RX ADMIN — Medication 500000 UNIT(S): at 06:52

## 2018-03-21 RX ADMIN — Medication 3 MILLILITER(S): at 17:10

## 2018-03-21 RX ADMIN — Medication 500000 UNIT(S): at 17:29

## 2018-03-21 RX ADMIN — HEPARIN SODIUM 11800 UNIT(S): 5000 INJECTION INTRAVENOUS; SUBCUTANEOUS at 22:50

## 2018-03-21 RX ADMIN — TACROLIMUS 8 MILLIGRAM(S): 5 CAPSULE ORAL at 19:59

## 2018-03-21 RX ADMIN — SODIUM POLYSTYRENE SULFONATE 15 GRAM(S): 4.1 POWDER, FOR SUSPENSION ORAL at 17:09

## 2018-03-21 RX ADMIN — Medication 1 APPLICATION(S): at 17:30

## 2018-03-21 RX ADMIN — TACROLIMUS 7 MILLIGRAM(S): 5 CAPSULE ORAL at 08:01

## 2018-03-21 RX ADMIN — Medication 500000 UNIT(S): at 12:00

## 2018-03-21 NOTE — PROGRESS NOTE ADULT - ASSESSMENT
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 Renal reconsulted for elevated cr. It seems cr has been stable at 0.9 - 1.1 but on 3/14 it was 1.2 and rakan to mid 3 and now downtrending today. Most likely this was vancomycin induced cast nephropathy that is resolving in setting of perhaps some component of vasoconstriction from tacrolimus.

## 2018-03-21 NOTE — PROGRESS NOTE ADULT - SUBJECTIVE AND OBJECTIVE BOX
[facility] DIVISION OF KIDNEY DISEASES AND HYPERTENSION -- FOLLOW UP NOTE  --------------------------------------------------------------------------------  Chief Complaint:    24 hour events/subjective:  doing well now  Crt downtrending  K high this AM      PAST HISTORY  --------------------------------------------------------------------------------  No significant changes to PMH, PSH, FHx, SHx, unless otherwise noted    ALLERGIES & MEDICATIONS  --------------------------------------------------------------------------------  Allergies    No Known Allergies    Intolerances      Standing Inpatient Medications  ALBUTerol/ipratropium for Nebulization 3 milliLiter(s) Nebulizer every 6 hours  aspirin enteric coated 81 milliGRAM(s) Oral daily  atovaquone Suspension 1500 milliGRAM(s) Oral daily  Calcium Citrate + Vit D, 315 mg/200 Unit 2 Tablet(s) 2 Tablet(s) Oral two times a day  diltiazem    milliGRAM(s) Oral daily  docusate sodium 100 milliGRAM(s) Oral three times a day  famotidine    Tablet 20 milliGRAM(s) Oral daily  heparin  Injectable 90808 Unit(s) SubCutaneous every 12 hours  insulin lispro (HumaLOG) corrective regimen sliding scale   SubCutaneous three times a day before meals  insulin lispro (HumaLOG) corrective regimen sliding scale   SubCutaneous at bedtime  multivitamin 1 Tablet(s) Oral daily  mycophenolate mofetil 1000 milliGRAM(s) Oral <User Schedule>  nystatin    Suspension 718938 Unit(s) Oral every 6 hours  pravastatin 20 milliGRAM(s) Oral at bedtime  predniSONE   Tablet 10 milliGRAM(s) Oral every 12 hours  silver sulfADIAZINE 1% Cream 1 Application(s) Topical two times a day  sodium bicarbonate 650 milliGRAM(s) Oral three times a day  tacrolimus 7 milliGRAM(s) Oral <User Schedule>  valGANciclovir 450 milliGRAM(s) Oral <User Schedule>    PRN Inpatient Medications  acetaminophen   Tablet. 650 milliGRAM(s) Oral every 6 hours PRN      REVIEW OF SYSTEMS  --------------------------------------------------------------------------------  Gen: No weight changes, fatigue, fevers/chills, weakness  Skin: No rashes  Head/Eyes/Ears/Mouth: No headache; Normal hearing; Normal vision w/o blurriness; No sinus pain/discomfort, sore throat  Respiratory: No dyspnea, cough, wheezing, hemoptysis  CV: No chest pain, PND, orthopnea  GI: No abdominal pain, diarrhea, constipation, nausea, vomiting, melena, hematochezia  : No increased frequency, dysuria, hematuria, nocturia  MSK: No joint pain/swelling; no back pain; no edema  Neuro: No dizziness/lightheadedness, weakness, seizures, numbness, tingling  Heme: No easy bruising or bleeding  Endo: No heat/cold intolerance  Psych: No significant nervousness, anxiety, stress, depression    All other systems were reviewed and are negative, except as noted.    VITALS/PHYSICAL EXAM  --------------------------------------------------------------------------------  T(C): 36.6 (03-21-18 @ 07:12), Max: 36.6 (03-21-18 @ 07:12)  HR: 103 (03-21-18 @ 07:12) (101 - 103)  BP: 127/85 (03-21-18 @ 07:12) (112/75 - 127/85)  RR: 18 (03-21-18 @ 07:12) (18 - 18)  SpO2: 98% (03-21-18 @ 07:12) (97% - 99%)  Wt(kg): --        03-20-18 @ 07:01  -  03-21-18 @ 07:00  --------------------------------------------------------  IN: 590 mL / OUT: 150 mL / NET: 440 mL    03-21-18 @ 07:01  -  03-21-18 @ 12:38  --------------------------------------------------------  IN: 240 mL / OUT: 0 mL / NET: 240 mL      Physical Exam:  	Gen: NAD, well-appearing  	HEENT: PERRL, supple neck, clear oropharynx  	Pulm: CTA B/L  	CV: RRR, S1S2; no rub  	Back: No spinal or CVA tenderness; no sacral edema  	Abd: +BS, soft, nontender/nondistended  	LE: Warm, FROM, no clubbing, intact strength; no edema  	Neuro: No focal deficits, intact gait      LABS/STUDIES  --------------------------------------------------------------------------------              8.6    15.6  >-----------<  499      [03-21-18 @ 04:43]              26.2     138  |  105  |  62  ----------------------------<  104      [03-21-18 @ 07:16]  5.6   |  19  |  3.40        Ca     8.9     [03-21-18 @ 07:16]      Mg     2.3     [03-21-18 @ 04:42]    TPro  6.8  /  Alb  3.4  /  TBili  0.4  /  DBili  x   /  AST  16  /  ALT  20  /  AlkPhos  99  [03-21-18 @ 04:42]      PTT: 76.4       [03-21-18 @ 04:42]      Creatinine Trend:  SCr 3.40 [03-21 @ 07:16]  SCr 3.46 [03-21 @ 04:42]  SCr 3.79 [03-20 @ 07:16]  SCr 3.61 [03-19 @ 00:14]  SCr 3.13 [03-18 @ 07:09]    Urinalysis - [03-18-18 @ 15:52]      Color Yellow / Appearance SL Turbid / SG 1.018 / pH 6.0      Gluc Negative / Ketone Negative  / Bili Negative / Urobili Negative       Blood Small / Protein 100 / Leuk Est Negative / Nitrite Negative      RBC 0-2 / WBC 0-2 / Hyaline 2-5 / Gran  / Sq Epi  / Non Sq Epi  / Bacteria Few    Urine Creatinine 101      [03-19-18 @ 20:01]  Urine Protein 89      [03-19-18 @ 20:01]  Urine Sodium 35      [03-18-18 @ 15:52]  Urine Urea Nitrogen 618      [03-18-18 @ 18:40]  Urine Potassium 26      [03-18-18 @ 15:52]  Urine Chloride <35      [03-18-18 @ 15:52]    Iron 22, TIBC 182, %sat 12      [02-13-18 @ 12:44]  Ferritin 79.0      [02-13-18 @ 12:44]  HbA1c 5.3      [03-18-18 @ 11:20]  TSH 1.48      [02-27-18 @ 08:13]  Lipid: chol 62, TG 33, HDL 17, LDL 38      [06-07-17 @ 06:14]      C3 Complement 135      [03-19-18 @ 15:13]  C4 Complement 37      [03-19-18 @ 15:13]  Free Light Chains: kappa 4.36, lambda 5.71, ratio = 0.76      [03-19 @ 15:13]  Immunofixation Serum:   No Monoclonal Band Identified      [03-19-18 @ 15:13]

## 2018-03-21 NOTE — PROGRESS NOTE ADULT - ASSESSMENT
Assessment:  1.  Bilateral, extensive upper extremity DVT  - Currently on lovenox  - Swelling of RUE controlled with elevation and ace wrap  2.  Heart Transplant  3.  Acute renal failure  4.  Right 3rf digit ischemia - stable  5.  Right foot bunion      Plan  1  Continue with heparin SQ for now - would check PTT today 6 hours after his next dose (next dose at 10AM, so 4PM).  Will make subtle adjustments to heparin dosing to get PTT closer to 50-60.  2.  R arm is clinically improving , and hematoma is stable/improved on CT scan - would continue with compression and observation for now.   The arm should always be wrapped with ACE and elevated for now        [place] [id number]

## 2018-03-21 NOTE — PROGRESS NOTE ADULT - SUBJECTIVE AND OBJECTIVE BOX
VASCULAR MEDICINE                         CC:  UEDVT    INTERVAL HISTORY:     No CP or SOB. R arm without symptoms, currently wrapped.  Arm continues to feel better.   CT showed stable/improved R arm hematoma.     Allergies    No Known Allergies    MEDICATIONS  (STANDING):  ALBUTerol/ipratropium for Nebulization 3 milliLiter(s) Nebulizer every 6 hours  aspirin enteric coated 81 milliGRAM(s) Oral daily  atovaquone Suspension 1500 milliGRAM(s) Oral daily  Calcium Citrate + Vit D, 315 mg/200 Unit 2 Tablet(s) 2 Tablet(s) Oral two times a day  diltiazem    milliGRAM(s) Oral daily  docusate sodium 100 milliGRAM(s) Oral three times a day  famotidine    Tablet 20 milliGRAM(s) Oral daily  heparin  Injectable 16721 Unit(s) SubCutaneous every 12 hours  insulin lispro (HumaLOG) corrective regimen sliding scale   SubCutaneous three times a day before meals  insulin lispro (HumaLOG) corrective regimen sliding scale   SubCutaneous at bedtime  multivitamin 1 Tablet(s) Oral daily  mycophenolate mofetil 1000 milliGRAM(s) Oral <User Schedule>  nystatin    Suspension 278735 Unit(s) Oral every 6 hours  pravastatin 20 milliGRAM(s) Oral at bedtime  predniSONE   Tablet 10 milliGRAM(s) Oral every 12 hours  silver sulfADIAZINE 1% Cream 1 Application(s) Topical two times a day  tacrolimus 7 milliGRAM(s) Oral <User Schedule>  valGANciclovir 450 milliGRAM(s) Oral <User Schedule>    MEDICATIONS  (PRN):  acetaminophen   Tablet. 650 milliGRAM(s) Oral every 6 hours PRN Mild Pain (1 - 3)    SOCIAL HISTORY:  unchanged    REVIEW OF SYSTEMS:  CONSTITUTIONAL: No fever, weight loss, or fatigue  EYES: No eye pain, visual disturbances, or discharge  ENMT:  No difficulty hearing, tinnitus, vertigo; No sinus or throat pain  NECK: No pain or stiffness  RESPIRATORY: No cough, wheezing, chills or hemoptysis; No Shortness of Breath  CARDIOVASCULAR: No chest pain, palpitations, passing out, dizziness, or leg swelling  GASTROINTESTINAL: No abdominal or epigastric pain. No nausea, vomiting, or hematemesis; No diarrhea or constipation. No melena or hematochezia.  GENITOURINARY: No dysuria, frequency, hematuria, or incontinence  NEUROLOGICAL: No headaches, memory loss, loss of strength, numbness, or tremors  SKIN: No itching, burning, rashes, or lesions   LYMPH Nodes: No enlarged glands  ENDOCRINE: No heat or cold intolerance; No hair loss  MUSCULOSKELETAL: No joint pain or swelling; No muscle, back, or extremity pain  PSYCHIATRIC: No depression, anxiety, mood swings, or difficulty sleeping  HEME/LYMPH: No easy bruising, or bleeding gums  ALLERY AND IMMUNOLOGIC: No hives or eczema	    [x ] All others negative	  [ ] Unable to obtain      ICU Vital Signs Last 24 Hrs  T(C): 36.6 (21 Mar 2018 07:12), Max: 36.6 (21 Mar 2018 07:12)  T(F): 97.8 (21 Mar 2018 07:12), Max: 97.8 (21 Mar 2018 07:12)  HR: 103 (21 Mar 2018 07:12) (96 - 103)  BP: 127/85 (21 Mar 2018 07:12) (112/75 - 131/78)  BP(mean): 89 (20 Mar 2018 23:21) (89 - 100)  ABP: --  ABP(mean): --  RR: 18 (21 Mar 2018 07:12) (18 - 18)  SpO2: 98% (21 Mar 2018 07:12) (97% - 100%)    Appearance: Normal	  HEENT:   Normal oral mucosa, PERRL, EOMI	  Lymphatic: No lymphadenopathy  Cardiovascular: Normal S1 S2   Respiratory: Lungs clear to auscultation	  Psychiatry: A & O x 3, Mood & affect appropriate  Gastrointestinal:  Soft, Non-tender, + BS	  Skin: No rashes, No ecchymoses, No cyanosis	  Neurologic: Non-focal  Extremities:  Right upper extremity bicep area fullness and edema which is improving.  Forearm without edema.  Hands are warm.  R 3rd digit is wrapped.                              8.6    15.6  )-----------( 499      ( 21 Mar 2018 04:43 )             26.2   03-21    138  |  105  |  62<H>  ----------------------------<  104<H>  5.6<H>   |  19<L>  |  3.40<H>    Ca    8.9      21 Mar 2018 07:16  Mg     2.3     03-21    TPro  6.8  /  Alb  3.4  /  TBili  0.4  /  DBili  x   /  AST  16  /  ALT  20  /  AlkPhos  99  03-21

## 2018-03-21 NOTE — PROGRESS NOTE ADULT - SUBJECTIVE AND OBJECTIVE BOX
Subjective: He feels well today without any symptoms of shortness of breath. Walking in simms with walker and assistance. No dyspnea and no cough.     Medications:  acetaminophen   Tablet. 650 milliGRAM(s) Oral every 6 hours PRN  ALBUTerol/ipratropium for Nebulization 3 milliLiter(s) Nebulizer every 6 hours  aspirin enteric coated 81 milliGRAM(s) Oral daily  atovaquone Suspension 1500 milliGRAM(s) Oral daily  Calcium Citrate + Vit D, 315 mg/200 Unit 2 Tablet(s) 2 Tablet(s) Oral two times a day  diltiazem    milliGRAM(s) Oral daily  docusate sodium 100 milliGRAM(s) Oral three times a day  famotidine    Tablet 20 milliGRAM(s) Oral daily  heparin  Injectable 27829 Unit(s) SubCutaneous every 12 hours  insulin lispro (HumaLOG) corrective regimen sliding scale   SubCutaneous three times a day before meals  insulin lispro (HumaLOG) corrective regimen sliding scale   SubCutaneous at bedtime  multivitamin 1 Tablet(s) Oral daily  mycophenolate mofetil 1000 milliGRAM(s) Oral <User Schedule>  nystatin    Suspension 333598 Unit(s) Oral every 6 hours  pravastatin 20 milliGRAM(s) Oral at bedtime  predniSONE   Tablet 10 milliGRAM(s) Oral every 12 hours  silver sulfADIAZINE 1% Cream 1 Application(s) Topical two times a day  sodium bicarbonate 650 milliGRAM(s) Oral three times a day  tacrolimus 7 milliGRAM(s) Oral <User Schedule>  valGANciclovir 450 milliGRAM(s) Oral <User Schedule>      Physical Exam:    Vital Signs Last 24 Hrs  T(C): 36.6 (21 Mar 2018 07:12), Max: 36.6 (21 Mar 2018 07:12)  T(F): 97.8 (21 Mar 2018 07:12), Max: 97.8 (21 Mar 2018 07:12)  HR: 103 (21 Mar 2018 07:12) (101 - 103)  BP: 127/85 (21 Mar 2018 07:12) (112/75 - 127/85)  BP(mean): 89 (20 Mar 2018 23:21) (89 - 89)  RR: 18 (21 Mar 2018 07:12) (18 - 18)  SpO2: 98% (21 Mar 2018 07:12) (97% - 99%)    Daily Weight in k.2 (21 Mar 2018 07:46)    I&O's Summary    20 Mar 2018 07:  -  21 Mar 2018 07:00  --------------------------------------------------------  IN: 590 mL / OUT: 150 mL / NET: 440 mL    21 Mar 2018 07:  -  21 Mar 2018 13:50  --------------------------------------------------------  IN: 600 mL / OUT: 0 mL / NET: 600 mL    General: No distress. Comfortable.  HEENT: EOM intact.  Neck: Neck supple. JVP not elevated. No masses  Chest: Clear to auscultation bilaterally  CV: Normal S1 and S2. No murmurs, rub, or gallops. Radial pulses normal.  Abdomen: Soft, non-distended, non-tender  Skin: No rashes or skin breakdown  Neurology: Alert and oriented times three. Sensation intact  Psych: Affect normal    Labs:                        8.6    15.6  )-----------( 499      ( 21 Mar 2018 04:43 )             26.2     03-    138  |  105  |  62<H>  ----------------------------<  104<H>  5.6<H>   |  19<L>  |  3.40<H>    Ca    8.9      21 Mar 2018 07:16  Mg     2.3         TPro  6.8  /  Alb  3.4  /  TBili  0.4  /  DBili  x   /  AST  16  /  ALT  20  /  AlkPhos  99  -    PTT - ( 21 Mar 2018 04:42 )  PTT:76.4 sec

## 2018-03-21 NOTE — PROGRESS NOTE ADULT - PROBLEM SELECTOR PLAN 1
Tacro levels are fluctuating and will monitor for now and if possible to keep 10-11 range Vanco induced cast nephropathy if levels are high in such cases.  If possible, dc PPI and use H2 blocker but if strong indication- continue PPI

## 2018-03-21 NOTE — PROGRESS NOTE ADULT - ASSESSMENT
Mr. Baez is a 67 year old man with ACC/AHA stage D chronic systolic heart failure due to NICM s/p HM2 LVAD 6/17 c/b pump thrombosis now s/p heart transplant on 2/23 (CMV D-/R+ and Toxo D-/R+), with post-op course c/b primary graft dysfunction, initially thought to be immune-mediated due to positive B-cell flow (negative CDC cross match) and received plasmapharesis/IVIg x2 with improvement in LV function. His course has been complicated by bilateral IJ/subclavian thrombi. He currently has acute renal failure of unclear etiology. However, he does not have clear evidence of allograft dysfunction or low output on exam. 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     Plan discussed with 2 Sánchez NP team and in multidisciplinary rounds.     #Heart Transplant  EMB #1: 1R/1A, AMR 1. DSA negative.  EMB #2: 1R/1A, AMR 1. DSA negative.  EMB #3: 1R/1A. stable AMR1. No DSA. RA 12, PA 40/16/27, PCW 11, PA 67%, CO/CI 6.8/3.6. TTE with mild RV enlargement and dysfunction.  EMB #4: 1R/2A with RA 10, PA 39/18, Wedge 18, CO/CI 6/3.2. No effusion on TTE.     # Immunosuppression:  Tacrolimus -  Will increase to 8 mg BID. Tacro level 10.1 this morning from 10.7. Target trough level of 12-14.   CellCept - continue 1000 mg PO BID.   Steroids - will give short pulse of oral steroids. Will give 50 mg PO tonight and tomorrow morning with subsequent taper.     #Hypertension  - Continue cardizem 120 mg daily.   	  # Antimicrobial prophylaxis:  Intermediate risk CMV - Valcyte for NORMAN to 450 mg daily just TWICE weekly.   Intermediate risk Toxo - continue Mepron 1500mg PO daily with food.  PCP - continue Mepron 1500mg PO daily  Thrush - continue Nystatin S/S 5 mL QID    # Bilateral IJ/Subclavian thrombi - unchanged on f/u U/S 3/12  - lovenox switched to heparin 10801 units subcutaneous Q12 (250 units/kg Q12) per vasc cards recs in setting of renal function. Please hold 3/19 PM and 3/20 AM dose for RHC.   - CHERIE negative    #Acute renal failure, possibly related to vancomycin dosing, improving.   - Vanco stopped.   - He responded to diuretics given on Friday. No further diuresis.     # CAV PPx  - Continue ECASA 81mg PO daily  - Continue pravastatin 20mg PO QHS    # ID   - Pseudomonas in sputum - course of cefepime completed 3/15  - DLES improved and vancomycin stopped given high trough with NORMAN. Per ID, will redose with daptomycin depending upon repeat vanc level this morning.   - silver sulfadizaine dressings per plastics for R 3rd digit.   - HCV viral load and PCR per protocol. Therapy to be initiated as outpatient by Dr. Thomas (Hepatology)  - Transplant ID following    #endo  - BG currently well controlled, ranging 103-157  - SSI AC & HS    # Additional prevention:  - Citracal + D 2 tabs PO BID  - multivitamin 1 tab daily  - Stress ulcer ppx while on steroids: will change pantoprazole to ranitidine per renal recommendations.  - Mag oxide discontinued currently. Mag 2.3 this AM.    #dispo  - Ongoing work with PT for ongoing exercise tolerance prior to d/c

## 2018-03-21 NOTE — PROGRESS NOTE ADULT - ATTENDING COMMENTS
2. Hyperkalemia: Ken related but could be secondary to tacrolimus as well. can give one dose of kayxalate 30gm and start Na bicarb 650mg TID given acidosis as well and hyperkalemia.       Sarkis Hutchins MD  Cell   Pager   Office

## 2018-03-21 NOTE — PROVIDER CONTACT NOTE (CRITICAL VALUE NOTIFICATION) - ASSESSMENT
Problem: Depressive Symptoms  Goal: Depressive Symptoms  Signs and symptoms of listed problems will be absent or manageable.   Outcome: Improving  Patient denied si or urges to swallow foreign objects at this time. She contracted for safety on the unit.       
no s/s of bleeding noted; s/p LVAD insertion on coumadin at home
HR 99, MAP 86, SPO2 100, pt remains alert and oriented x4, normal sinus on the monitor, two units of blood given

## 2018-03-22 LAB
ALBUMIN SERPL ELPH-MCNC: 3.3 G/DL — SIGNIFICANT CHANGE UP (ref 3.3–5)
ALP SERPL-CCNC: 101 U/L — SIGNIFICANT CHANGE UP (ref 40–120)
ALT FLD-CCNC: 19 U/L RC — SIGNIFICANT CHANGE UP (ref 10–45)
ANION GAP SERPL CALC-SCNC: 16 MMOL/L — SIGNIFICANT CHANGE UP (ref 5–17)
APTT BLD: 60.8 SEC — HIGH (ref 27.5–37.4)
AST SERPL-CCNC: 15 U/L — SIGNIFICANT CHANGE UP (ref 10–40)
BILIRUB SERPL-MCNC: 0.3 MG/DL — SIGNIFICANT CHANGE UP (ref 0.2–1.2)
BUN SERPL-MCNC: 60 MG/DL — HIGH (ref 7–23)
CALCIUM SERPL-MCNC: 8.6 MG/DL — SIGNIFICANT CHANGE UP (ref 8.4–10.5)
CHLORIDE SERPL-SCNC: 105 MMOL/L — SIGNIFICANT CHANGE UP (ref 96–108)
CMV DNA CSF QL NAA+PROBE: SIGNIFICANT CHANGE UP
CO2 SERPL-SCNC: 20 MMOL/L — LOW (ref 22–31)
CREAT SERPL-MCNC: 3.06 MG/DL — HIGH (ref 0.5–1.3)
CRYOGLOB SERPL-MCNC: NEGATIVE — SIGNIFICANT CHANGE UP
GLUCOSE BLDC GLUCOMTR-MCNC: 132 MG/DL — HIGH (ref 70–99)
GLUCOSE BLDC GLUCOMTR-MCNC: 144 MG/DL — HIGH (ref 70–99)
GLUCOSE BLDC GLUCOMTR-MCNC: 206 MG/DL — HIGH (ref 70–99)
GLUCOSE BLDC GLUCOMTR-MCNC: 313 MG/DL — HIGH (ref 70–99)
GLUCOSE SERPL-MCNC: 145 MG/DL — HIGH (ref 70–99)
HCT VFR BLD CALC: 26.1 % — LOW (ref 39–50)
HGB BLD-MCNC: 8.5 G/DL — LOW (ref 13–17)
INR BLD: 1.19 RATIO — HIGH (ref 0.88–1.16)
MAGNESIUM SERPL-MCNC: 2 MG/DL — SIGNIFICANT CHANGE UP (ref 1.6–2.6)
MCHC RBC-ENTMCNC: 29.9 PG — SIGNIFICANT CHANGE UP (ref 27–34)
MCHC RBC-ENTMCNC: 32.6 GM/DL — SIGNIFICANT CHANGE UP (ref 32–36)
MCV RBC AUTO: 91.7 FL — SIGNIFICANT CHANGE UP (ref 80–100)
PLATELET # BLD AUTO: 492 K/UL — HIGH (ref 150–400)
POTASSIUM SERPL-MCNC: 4.9 MMOL/L — SIGNIFICANT CHANGE UP (ref 3.5–5.3)
POTASSIUM SERPL-SCNC: 4.9 MMOL/L — SIGNIFICANT CHANGE UP (ref 3.5–5.3)
PROT SERPL-MCNC: 6.9 G/DL — SIGNIFICANT CHANGE UP (ref 6–8.3)
PROTHROM AB SERPL-ACNC: 12.9 SEC — HIGH (ref 9.8–12.7)
RBC # BLD: 2.84 M/UL — LOW (ref 4.2–5.8)
RBC # FLD: 16.5 % — HIGH (ref 10.3–14.5)
SODIUM SERPL-SCNC: 141 MMOL/L — SIGNIFICANT CHANGE UP (ref 135–145)
TACROLIMUS SERPL-MCNC: 9.4 NG/ML — SIGNIFICANT CHANGE UP
WBC # BLD: 17.8 K/UL — HIGH (ref 3.8–10.5)
WBC # FLD AUTO: 17.8 K/UL — HIGH (ref 3.8–10.5)

## 2018-03-22 PROCEDURE — 99232 SBSQ HOSP IP/OBS MODERATE 35: CPT

## 2018-03-22 PROCEDURE — 99233 SBSQ HOSP IP/OBS HIGH 50: CPT

## 2018-03-22 PROCEDURE — 71045 X-RAY EXAM CHEST 1 VIEW: CPT | Mod: 26

## 2018-03-22 RX ADMIN — Medication 81 MILLIGRAM(S): at 12:04

## 2018-03-22 RX ADMIN — Medication 20 MILLIGRAM(S): at 23:02

## 2018-03-22 RX ADMIN — Medication 3 MILLILITER(S): at 18:29

## 2018-03-22 RX ADMIN — HEPARIN SODIUM 11800 UNIT(S): 5000 INJECTION INTRAVENOUS; SUBCUTANEOUS at 10:09

## 2018-03-22 RX ADMIN — Medication 650 MILLIGRAM(S): at 12:34

## 2018-03-22 RX ADMIN — MYCOPHENOLATE MOFETIL 1000 MILLIGRAM(S): 250 CAPSULE ORAL at 19:59

## 2018-03-22 RX ADMIN — Medication 500000 UNIT(S): at 23:55

## 2018-03-22 RX ADMIN — ATOVAQUONE 1500 MILLIGRAM(S): 750 SUSPENSION ORAL at 12:02

## 2018-03-22 RX ADMIN — Medication 650 MILLIGRAM(S): at 12:04

## 2018-03-22 RX ADMIN — Medication 50 MILLIGRAM(S): at 06:37

## 2018-03-22 RX ADMIN — Medication 120 MILLIGRAM(S): at 06:35

## 2018-03-22 RX ADMIN — Medication 3 MILLILITER(S): at 23:55

## 2018-03-22 RX ADMIN — TACROLIMUS 8 MILLIGRAM(S): 5 CAPSULE ORAL at 19:59

## 2018-03-22 RX ADMIN — Medication 500000 UNIT(S): at 12:02

## 2018-03-22 RX ADMIN — Medication 8: at 12:00

## 2018-03-22 RX ADMIN — FAMOTIDINE 20 MILLIGRAM(S): 10 INJECTION INTRAVENOUS at 12:02

## 2018-03-22 RX ADMIN — HEPARIN SODIUM 11800 UNIT(S): 5000 INJECTION INTRAVENOUS; SUBCUTANEOUS at 23:55

## 2018-03-22 RX ADMIN — Medication 650 MILLIGRAM(S): at 16:18

## 2018-03-22 RX ADMIN — TACROLIMUS 8 MILLIGRAM(S): 5 CAPSULE ORAL at 08:06

## 2018-03-22 RX ADMIN — MYCOPHENOLATE MOFETIL 1000 MILLIGRAM(S): 250 CAPSULE ORAL at 08:06

## 2018-03-22 RX ADMIN — Medication 500000 UNIT(S): at 18:28

## 2018-03-22 RX ADMIN — Medication 3 MILLILITER(S): at 06:34

## 2018-03-22 RX ADMIN — Medication 500000 UNIT(S): at 06:34

## 2018-03-22 RX ADMIN — Medication 1 TABLET(S): at 12:02

## 2018-03-22 RX ADMIN — Medication 650 MILLIGRAM(S): at 23:55

## 2018-03-22 RX ADMIN — Medication 650 MILLIGRAM(S): at 06:35

## 2018-03-22 RX ADMIN — Medication 1 APPLICATION(S): at 06:35

## 2018-03-22 RX ADMIN — Medication 3 MILLILITER(S): at 12:02

## 2018-03-22 RX ADMIN — Medication 50 MILLIGRAM(S): at 18:28

## 2018-03-22 RX ADMIN — Medication 650 MILLIGRAM(S): at 23:02

## 2018-03-22 RX ADMIN — Medication 1 APPLICATION(S): at 18:28

## 2018-03-22 NOTE — PROGRESS NOTE ADULT - PROBLEM SELECTOR PLAN 1
continue current medication  prednisone 50mg po x 5 more doses ( 6 doses toal as per aicha)  anti rejection meds  oob  daily weights

## 2018-03-22 NOTE — PROGRESS NOTE ADULT - ASSESSMENT
Assessment:  1.  Bilateral, extensive upper extremity DVT  - Currently on lovenox  - Swelling of RUE controlled with elevation and ace wrap  2.  Heart Transplant  3.  Acute renal failure  4.  Right 3rf digit ischemia - stable  5.  Right foot bunion      Plan  1  Continue with heparin SQ at current dose would check PTT tomorrow 6 hours after receiving heparin dose   2.  R arm is clinically improving , and hematoma is stable/improved on CT scan - would continue with compression and observation for now.   The arm should always be wrapped with ACE and elevated for now  3.  Ok to hold heparin prior to cardiac biopsies         Daysi [id number]

## 2018-03-22 NOTE — PROGRESS NOTE ADULT - SUBJECTIVE AND OBJECTIVE BOX
VITAL SIGNS-Tele: St 1st deg. 100-110    Vital Signs Last 24 Hrs  T(C): 36.1 (18 @ 11:40), Max: 37 (18 @ 19:31)  HR: 108 (18 @ 11:40) (104 - 115)  BP: 123/77 (18 @ 11:40) (119/79 - 129/86)  SpO2: 99% (18 @ 11:40) (96% - 99%)          @ 07:01  -   @ 07:00  --------------------------------------------------------  IN: 990 mL / OUT: 1000 mL / NET: -10 mL     @ 07:01  -   @ 16:15  --------------------------------------------------------  IN: 610 mL / OUT: 700 mL / NET: -90 mL    Daily     Daily Weight in k.2 (22 Mar 2018 09:50)    CAPILLARY BLOOD GLUCOSE  POCT Blood Glucose.: 313 mg/dL (22 Mar 2018 11:33)- on high dose prednisone  POCT Blood Glucose.: 132 mg/dL (22 Mar 2018 07:24)  POCT Blood Glucose.: 144 mg/dL (21 Mar 2018 22:23)  POCT Blood Glucose.: 96 mg/dL (21 Mar 2018 19:40)        Coumadin    [ ] YES          [x  ]      NO         Reason:       PHYSICAL EXAM:  Neurology: alert and oriented x 3, nonfocal, no gross deficits  CV : S1S2  Sternal Wound :  CDI , Stable  Lungs: CTA  Abdomen: soft, nontender, nondistended, positive bowel sounds, last bowel movement       3/21  Extremities:     no calf tenderness, no edema    acetaminophen   Tablet. 650 milliGRAM(s) Oral every 6 hours PRN  ALBUTerol/ipratropium for Nebulization 3 milliLiter(s) Nebulizer every 6 hours  aspirin enteric coated 81 milliGRAM(s) Oral daily  atovaquone Suspension 1500 milliGRAM(s) Oral daily  Calcium Citrate + Vit D, 315 mg/200 Unit 2 Tablet(s) 2 Tablet(s) Oral two times a day  diltiazem    milliGRAM(s) Oral daily  docusate sodium 100 milliGRAM(s) Oral three times a day  famotidine    Tablet 20 milliGRAM(s) Oral daily  heparin  Injectable 29510 Unit(s) SubCutaneous every 12 hours  insulin lispro (HumaLOG) corrective regimen sliding scale   SubCutaneous three times a day before meals  insulin lispro (HumaLOG) corrective regimen sliding scale   SubCutaneous at bedtime  multivitamin 1 Tablet(s) Oral daily  mycophenolate mofetil 1000 milliGRAM(s) Oral <User Schedule>  nystatin    Suspension 778121 Unit(s) Oral every 6 hours  pravastatin 20 milliGRAM(s) Oral at bedtime  predniSONE   Tablet 50 milliGRAM(s) Oral every 12 hours  silver sulfADIAZINE 1% Cream 1 Application(s) Topical two times a day  sodium bicarbonate 650 milliGRAM(s) Oral three times a day  tacrolimus 8 milliGRAM(s) Oral <User Schedule>  valGANciclovir 450 milliGRAM(s) Oral <User Schedule>      Physical Therapy Rec:   Home  [  ]   Home w/ PT  [ x ]  Rehab  [  ]  Discussed with Cardiothoracic Team at AM rounds.

## 2018-03-22 NOTE — PROGRESS NOTE ADULT - ASSESSMENT
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 Found to have b/l IJ/subclavian thrombi as well.          	   Antimicrobial prophylaxis:  Intermediate risk CMV - Adjust Valcyte for NORMAN to 450 mg daily just TWICE weekly. Last dose on Saturday so will schedule for Tues/Sat.  Intermediate risk Toxo - continue Mepron 1500mg PO daily with food.  PCP - continue Mepron 1500mg PO daily  Thrush - continue Nystatin S/S 5 mL Qid      - Pseudomonas in sputum - course of cefepime completed 3/15/18  - silver sulfadizaine dressings per plastics for R 3rd digit.   - HCV viral load and PCR per protocol. weekly  Therapy to be initiated as outpatient.  - old LVAD site wound with wound vac in place and slowly granulating without purulence and currently off all antibiotics  - RUE forearm- Ct read as a hematoma ? site of old A-line but does not appear erythematous or with streaking  - s/p steroid bolus for rejection concerns will continue to monitor for infection and continue OI prophylaxis      Gary Lujan MD  [phone]  After 5pm/weekends [phone]

## 2018-03-22 NOTE — PROGRESS NOTE ADULT - SUBJECTIVE AND OBJECTIVE BOX
Subjective: No current complaints. He feels well. No shortness of breath.     Medications:  acetaminophen   Tablet. 650 milliGRAM(s) Oral every 6 hours PRN  ALBUTerol/ipratropium for Nebulization 3 milliLiter(s) Nebulizer every 6 hours  aspirin enteric coated 81 milliGRAM(s) Oral daily  atovaquone Suspension 1500 milliGRAM(s) Oral daily  Calcium Citrate + Vit D, 315 mg/200 Unit 2 Tablet(s) 2 Tablet(s) Oral two times a day  diltiazem    milliGRAM(s) Oral daily  docusate sodium 100 milliGRAM(s) Oral three times a day  famotidine    Tablet 20 milliGRAM(s) Oral daily  heparin  Injectable 53866 Unit(s) SubCutaneous every 12 hours  insulin lispro (HumaLOG) corrective regimen sliding scale   SubCutaneous three times a day before meals  insulin lispro (HumaLOG) corrective regimen sliding scale   SubCutaneous at bedtime  multivitamin 1 Tablet(s) Oral daily  mycophenolate mofetil 1000 milliGRAM(s) Oral <User Schedule>  nystatin    Suspension 502105 Unit(s) Oral every 6 hours  pravastatin 20 milliGRAM(s) Oral at bedtime  silver sulfADIAZINE 1% Cream 1 Application(s) Topical two times a day  sodium bicarbonate 650 milliGRAM(s) Oral three times a day  tacrolimus 8 milliGRAM(s) Oral <User Schedule>  valGANciclovir 450 milliGRAM(s) Oral <User Schedule>      Physical Exam:    Vital Signs Last 24 Hrs  T(C): 36.1 (22 Mar 2018 11:40), Max: 37 (21 Mar 2018 19:31)  T(F): 97 (22 Mar 2018 11:40), Max: 98.6 (21 Mar 2018 19:31)  HR: 108 (22 Mar 2018 11:40) (104 - 115)  BP: 123/77 (22 Mar 2018 11:40) (114/80 - 129/86)  BP(mean): 103 (22 Mar 2018 08:00) (92 - 103)  RR: 18 (22 Mar 2018 11:40) (18 - 18)  SpO2: 99% (22 Mar 2018 11:40) (96% - 99%)    Daily Weight in k.2 (22 Mar 2018 09:50)    I&O's Summary    21 Mar 2018 07:01  -  22 Mar 2018 07:00  --------------------------------------------------------  IN: 990 mL / OUT: 1000 mL / NET: -10 mL    22 Mar 2018 07:01  -  22 Mar 2018 13:13  --------------------------------------------------------  IN: 610 mL / OUT: 700 mL / NET: -90 mL      General: No distress. Comfortable.  HEENT: EOM intact.  Neck: Neck supple. JVP not elevated. No masses  Chest: Clear to auscultation bilaterally  CV: Normal S1 and S2. No murmurs, rub, or gallops. Radial pulses normal.  Abdomen: Soft, non-distended, non-tender  Skin: No rashes or skin breakdown  Neurology: Alert and oriented times three. Sensation intact  Psych: Affect normal    Labs:                        8.5    17.8  )-----------( 492      ( 22 Mar 2018 07:09 )             26.1     03-    141  |  105  |  60<H>  ----------------------------<  145<H>  4.9   |  20<L>  |  3.06<H>    Ca    8.6      22 Mar 2018 07:09  Mg     2.0         TPro  6.9  /  Alb  3.3  /  TBili  0.3  /  DBili  x   /  AST  15  /  ALT  19  /  AlkPhos  101  03-22    PT/INR - ( 22 Mar 2018 07:10 )   PT: 12.9 sec;   INR: 1.19 ratio         PTT - ( 22 Mar 2018 07:10 )  PTT:60.8 sec

## 2018-03-22 NOTE — PROGRESS NOTE ADULT - SUBJECTIVE AND OBJECTIVE BOX
INFECTIOUS DISEASES FOLLOW UP-- Sujey Lujan  [phone]    This is a follow up note for this  67yMale with s/p orthotopic heart transplant on 2/23, improving  exercise tolerance, afebrile with a leukocytosis on steroids      ROS:  CONSTITUTIONAL:  No fever, good appetite  CARDIOVASCULAR:  No chest pain or palpitations  RESPIRATORY:  No dyspnea  GASTROINTESTINAL:  No nausea, vomiting, diarrhea, or abdominal pain  GENITOURINARY:  No dysuria  NEUROLOGIC:  No headache,     Allergies    No Known Allergies    Intolerances        ANTIBIOTICS/RELEVANT:  antimicrobials  atovaquone Suspension 1500 milliGRAM(s) Oral daily  nystatin    Suspension 027214 Unit(s) Oral every 6 hours  valGANciclovir 450 milliGRAM(s) Oral <User Schedule>    immunologic:  mycophenolate mofetil 1000 milliGRAM(s) Oral <User Schedule>  tacrolimus 8 milliGRAM(s) Oral <User Schedule>    OTHER:  acetaminophen   Tablet. 650 milliGRAM(s) Oral every 6 hours PRN  ALBUTerol/ipratropium for Nebulization 3 milliLiter(s) Nebulizer every 6 hours  aspirin enteric coated 81 milliGRAM(s) Oral daily  Calcium Citrate + Vit D, 315 mg/200 Unit 2 Tablet(s) 2 Tablet(s) Oral two times a day  diltiazem    milliGRAM(s) Oral daily  docusate sodium 100 milliGRAM(s) Oral three times a day  famotidine    Tablet 20 milliGRAM(s) Oral daily  heparin  Injectable 20577 Unit(s) SubCutaneous every 12 hours  insulin lispro (HumaLOG) corrective regimen sliding scale   SubCutaneous three times a day before meals  insulin lispro (HumaLOG) corrective regimen sliding scale   SubCutaneous at bedtime  multivitamin 1 Tablet(s) Oral daily  pravastatin 20 milliGRAM(s) Oral at bedtime  predniSONE   Tablet 50 milliGRAM(s) Oral every 12 hours  silver sulfADIAZINE 1% Cream 1 Application(s) Topical two times a day  sodium bicarbonate 650 milliGRAM(s) Oral three times a day      Objective:  Vital Signs Last 24 Hrs  T(C): 36.4 (22 Mar 2018 16:00), Max: 37 (21 Mar 2018 19:31)  T(F): 97.5 (22 Mar 2018 16:00), Max: 98.6 (21 Mar 2018 19:31)  HR: 110 (22 Mar 2018 16:00) (104 - 115)  BP: 123/80 (22 Mar 2018 16:00) (119/79 - 129/86)  BP(mean): 103 (22 Mar 2018 08:00) (93 - 103)  RR: 18 (22 Mar 2018 16:00) (18 - 18)  SpO2: 99% (22 Mar 2018 16:00) (96% - 99%)    PHYSICAL EXAM:  Constitutional:no acute distress  Eyes:WALT, EOMI  Ear/Nose/Throat: no oral lesions, 	  Respiratory: clear BL decreased at right base  sternotomy site healed  old LVAD site with wound vac in place  Cardiovascular: S1S2  Gastrointestinal:soft, (+) BS, no tenderness  Extremities:no e/e/c RUE with ace bandage but swelling decreased  No Lymphadenopathy  IV sites not inflammed.    LABS:                        8.5    17.8  )-----------( 492      ( 22 Mar 2018 07:09 )             26.1     03-22    141  |  105  |  60<H>  ----------------------------<  145<H>  4.9   |  20<L>  |  3.06<H>    Ca    8.6      22 Mar 2018 07:09  Mg     2.0     03-22    TPro  6.9  /  Alb  3.3  /  TBili  0.3  /  DBili  x   /  AST  15  /  ALT  19  /  AlkPhos  101  03-22    PT/INR - ( 22 Mar 2018 07:10 )   PT: 12.9 sec;   INR: 1.19 ratio         PTT - ( 22 Mar 2018 07:10 )  PTT:60.8 sec      MICROBIOLOGY:    Cytomegalovirus By PCR (03.21.18 @ 19:27)    [facility]galovirus By PCR:   [facility]      Hepatitis C Virus RNA Detection by PCR (03.13.18 @ 23:47)    Hepatitis C Virus RNA Detection by PCR: 23102 IU/mL    Hepatitis C RNA, Log Value: 4.34: HCV RNA Quantification by RT-PCR using Renee m2000:  Assay dynamic range:  12 IU/mL to 100,000,000 HCV RNA IU/mL  1.08 (log10) IU/mL to 8.00 (log10) IU/mL  Assay reference range: Not Detected  The results of this test should be interpreted with consideration of all  clinical and laboratory findings. A result of  "No HCV RNA Detected" does  not preclude the possibility of infection with hepatitis C virus.  In  particular, caution should be used when interpreting low level positive  results when the test is used for diagnostic purposes. LogIU/mL        RECENT CULTURES:      RADIOLOGY & ADDITIONAL STUDIES:    < from: Xray Chest 1 View- PORTABLE-Routine (03.22.18 @ 05:27) >  Findings: Patient is status post median sternotomy. Dense right basilar   opacity is slightly improved since the prior exam. There is linear   atelectasis in the periphery of the left lung.    Impression: Improving right base atelectasisand/or effusion.    < end of copied text >

## 2018-03-22 NOTE — PROGRESS NOTE ADULT - PROBLEM SELECTOR PLAN 1
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  If possible, dc PPI and use H2 blocker but if strong indication- continue PPI  Tacro increased yesterday and will monitor crt given recent increase due to worsening cardiac rejection.

## 2018-03-22 NOTE — PROGRESS NOTE ADULT - SUBJECTIVE AND OBJECTIVE BOX
Behavioral Cardiology Progress Note     HPI:  Mr. Baez is a 67 year-old man, , only son . Owns house he lives in with his brother.  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  No PPH.     Current stressors:   Hospitalization   s/p Heart transplant (18)      Support system/family support: Good support from family and close friend      Coping strategies: Talking with family and staff, watching TV, his pastora, talking to his granddaughter     Understanding of medical illness and treatment plan:   Ongoing education on medication management provided by HT coordinator and other team members. Strategies being utilized to accommodate limited literacy (pictures of medication, frequent teach-back).  Good understanding of need for lifelong immunosuppressant medication, importance of calling HT coordinator.  Benefits from frequent review and teach-back of all education.     MSE:  Seen sitting in chair.  A&Ox3.  Well related with good eye contact. Thought process goal directed. No evidence of any psychosis, delusions, jona.  Mood neutral. Affect slightly restricted. Insight and judgment adequate.

## 2018-03-22 NOTE — PROGRESS NOTE ADULT - SUBJECTIVE AND OBJECTIVE BOX
VASCULAR MEDICINE                         CC:  UEDVT    INTERVAL HISTORY:     No CP or SOB. R arm without symptoms, currently wrapped.  Arm continues to feel better.     Allergies    No Known Allergies  MEDICATIONS  (STANDING):  ALBUTerol/ipratropium for Nebulization 3 milliLiter(s) Nebulizer every 6 hours  aspirin enteric coated 81 milliGRAM(s) Oral daily  atovaquone Suspension 1500 milliGRAM(s) Oral daily  Calcium Citrate + Vit D, 315 mg/200 Unit 2 Tablet(s) 2 Tablet(s) Oral two times a day  diltiazem    milliGRAM(s) Oral daily  docusate sodium 100 milliGRAM(s) Oral three times a day  famotidine    Tablet 20 milliGRAM(s) Oral daily  heparin  Injectable 21940 Unit(s) SubCutaneous every 12 hours  insulin lispro (HumaLOG) corrective regimen sliding scale   SubCutaneous three times a day before meals  insulin lispro (HumaLOG) corrective regimen sliding scale   SubCutaneous at bedtime  multivitamin 1 Tablet(s) Oral daily  mycophenolate mofetil 1000 milliGRAM(s) Oral <User Schedule>  nystatin    Suspension 577775 Unit(s) Oral every 6 hours  pravastatin 20 milliGRAM(s) Oral at bedtime  silver sulfADIAZINE 1% Cream 1 Application(s) Topical two times a day  sodium bicarbonate 650 milliGRAM(s) Oral three times a day  tacrolimus 8 milliGRAM(s) Oral <User Schedule>  valGANciclovir 450 milliGRAM(s) Oral <User Schedule>    MEDICATIONS  (PRN):  acetaminophen   Tablet. 650 milliGRAM(s) Oral every 6 hours PRN Mild Pain (1 - 3)      SOCIAL HISTORY:  unchanged    REVIEW OF SYSTEMS:  CONSTITUTIONAL: No fever, weight loss, or fatigue  EYES: No eye pain, visual disturbances, or discharge  ENMT:  No difficulty hearing, tinnitus, vertigo; No sinus or throat pain  NECK: No pain or stiffness  RESPIRATORY: No cough, wheezing, chills or hemoptysis; No Shortness of Breath  CARDIOVASCULAR: No chest pain, palpitations, passing out, dizziness, or leg swelling  GASTROINTESTINAL: No abdominal or epigastric pain. No nausea, vomiting, or hematemesis; No diarrhea or constipation. No melena or hematochezia.  GENITOURINARY: No dysuria, frequency, hematuria, or incontinence  NEUROLOGICAL: No headaches, memory loss, loss of strength, numbness, or tremors  SKIN: No itching, burning, rashes, or lesions   LYMPH Nodes: No enlarged glands  ENDOCRINE: No heat or cold intolerance; No hair loss  MUSCULOSKELETAL: No joint pain or swelling; No muscle, back, or extremity pain  PSYCHIATRIC: No depression, anxiety, mood swings, or difficulty sleeping  HEME/LYMPH: No easy bruising, or bleeding gums  ALLERY AND IMMUNOLOGIC: No hives or eczema	    [x ] All others negative	  [ ] Unable to obtain  Vital Signs Last 24 Hrs  T(C): 36.1 (03-22-18 @ 11:40), Max: 37 (03-21-18 @ 19:31)  T(F): 97 (03-22-18 @ 11:40), Max: 98.6 (03-21-18 @ 19:31)  HR: 108 (03-22-18 @ 11:40) (104 - 115)  BP: 123/77 (03-22-18 @ 11:40) (114/80 - 129/86)  BP(mean): 103 (03-22-18 @ 08:00) (92 - 103)  RR: 18 (03-22-18 @ 11:40) (18 - 18)  SpO2: 99% (03-22-18 @ 11:40) (96% - 99%)    Appearance: Normal	  HEENT:   Normal oral mucosa, PERRL, EOMI	  Lymphatic: No lymphadenopathy  Cardiovascular: Normal S1 S2   Respiratory: Lungs clear to auscultation	  Psychiatry: A & O x 3, Mood & affect appropriate  Gastrointestinal:  Soft, Non-tender, + BS	  Skin: No rashes, No ecchymoses, No cyanosis	  Neurologic: Non-focal  Extremities:  Right upper extremity bicep area fullness and edema which is improving.  Forearm without edema.  Hands are warm.  R 3rd digit is wrapped.                                         8.5    17.8  )-----------( 492      ( 22 Mar 2018 07:09 )             26.1   03-22    141  |  105  |  60<H>  ----------------------------<  145<H>  4.9   |  20<L>  |  3.06<H>    Ca    8.6      22 Mar 2018 07:09  Mg     2.0     03-22    TPro  6.9  /  Alb  3.3  /  TBili  0.3  /  DBili  x   /  AST  15  /  ALT  19  /  AlkPhos  101  03-22

## 2018-03-22 NOTE — PROGRESS NOTE ADULT - ASSESSMENT
Mr. Baez is a 67 year old man with ACC/AHA stage D chronic systolic heart failure due to NICM s/p HM2 LVAD 6/17 c/b pump thrombosis now s/p heart transplant on 2/23 (CMV D-/R+ and Toxo D-/R+), with post-op course c/b primary graft dysfunction, initially thought to be immune-mediated due to positive B-cell flow (negative CDC cross match) and received plasmapharesis/IVIg x2 with improvement in LV function. His course has been complicated by bilateral IJ/subclavian thrombi. He currently has acute renal failure of unclear etiology. However, he does not have clear evidence of allograft dysfunction or low output on exam. 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     Plan discussed with 2 Sánchez NP team and in multidisciplinary rounds.     #Heart Transplant  EMB #1: 1R/1A, AMR 1. DSA negative.  EMB #2: 1R/1A, AMR 1. DSA negative.  EMB #3: 1R/1A. stable AMR1. No DSA. RA 12, PA 40/16/27, PCW 11, PA 67%, CO/CI 6.8/3.6. TTE with mild RV enlargement and dysfunction.  EMB #4: 1R/2A with RA 10, PA 39/18, Wedge 18, CO/CI 6/3.2. No effusion on TTE.     # Immunosuppression:  Tacrolimus -  Will continue dosing at 8 mg BID (increased yesterday). Tacro level 9.4 this morning from 10.9. Target trough level of 12-14.   CellCept - continue 1000 mg PO BID.   Steroids - will give short pulse of oral steroids. Will give 50 mg PO BID for three days with subsequent taper.     #Hypertension  - Continue cardizem 120 mg daily.   	  # Antimicrobial prophylaxis:  Intermediate risk CMV - Valcyte for NORMAN to 450 mg daily just TWICE weekly.   Intermediate risk Toxo - continue Mepron 1500mg PO daily with food.  PCP - continue Mepron 1500mg PO daily  Thrush - continue Nystatin S/S 5 mL QID    # Bilateral IJ/Subclavian thrombi - unchanged on f/u U/S 3/12  - lovenox switched to heparin 16787 units subcutaneous Q12 (250 units/kg Q12) per vasc cards recs in setting of renal function. Please hold 3/19 PM and 3/20 AM dose for RHC.   - CHERIE negative    #Acute renal failure, possibly related to vancomycin dosing, improving.   - Vanco stopped.   - He responded to diuretics given on Friday. No further diuresis.     # CAV PPx  - Continue ECASA 81mg PO daily  - Continue pravastatin 20mg PO QHS    # ID   - Pseudomonas in sputum - course of cefepime completed 3/15  - DLES improved and vancomycin stopped given high trough with NORMAN. Per ID, will redose with daptomycin depending upon repeat vanc level this morning.   - silver sulfadizaine dressings per plastics for R 3rd digit.   - HCV viral load and PCR per protocol. Therapy to be initiated as outpatient by Dr. Thomas (Hepatology)  - Transplant ID following    #endo  - BG currently well controlled, ranging 103-157  - SSI AC & HS    # Additional prevention:  - Citracal + D 2 tabs PO BID  - multivitamin 1 tab daily  - Stress ulcer ppx while on steroids: will change pantoprazole to ranitidine per renal recommendations.  - Mag oxide discontinued currently. Mag 2.3 this AM.    #dispo  - Ongoing work with PT for ongoing exercise tolerance prior to d/c

## 2018-03-22 NOTE — PROGRESS NOTE ADULT - ASSESSMENT
Slept well last night.  Appetite good.  Mood "pretty good;" finds talking about his feelings related to transplant helpful.  Denies symptoms of anxiety or depression. Continues to ambulate with PT.  Has good support system (brother Nawaf Barahona and close friend Tahira) who have been communicating with HT coordinator.  They are both involved in his care and available for ongoing support with medication and other aspects of his care.  Receptive to support and validation.     DX: Adjustment disorder       Recommendations:   Behavioral Cardiology will continue to follow as needed.

## 2018-03-22 NOTE — CHART NOTE - NSCHARTNOTEFT_GEN_A_CORE
Patient seen for Transplant follow-up on 2-[facility]    Chart reviewed - Patient admitted with ACC/AHA stage D chronic systolic HF due to NICM S/P heart transplant (2/23); LVAD explant and AICD removal. 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 ?embolic event to right middle finger - ultrasound showed possible RUE hematoma, underwent cardiac catheter and biopsy (3/20). Noted per ID patient with right lower lobe atelectasis vs. early PNA S/P bronchoscopy - improving. NORMAN likely due to Vanco dosing - vanco stopped - patient improving.     Source: Patient [x]    Family [ ]     other [x] Electronic medical chart review    Diet: Low fiber/residue, no concentrated potassium with Ensure Enlive 3x/daily    Patient reports good appetite and consumption of ~75% of most meals. Patient denies any GI distress, nausea, vomiting, chewing/swallowing issues, diarrhea or constipation. Last BM was today and it was normal.     Current Weight: 3/22 weight is 165.7 pounds, 3/18 weight is 163.1 pounds; 2.6 pound weight gain is likely due to fluid shift.    Pertinent Medications: MEDICATIONS  (STANDING):  ALBUTerol/ipratropium for Nebulization 3 milliLiter(s) Nebulizer every 6 hours  aspirin enteric coated 81 milliGRAM(s) Oral daily  atovaquone Suspension 1500 milliGRAM(s) Oral daily  Calcium Citrate + Vit D, 315 mg/200 Unit 2 Tablet(s) 2 Tablet(s) Oral two times a day  diltiazem    milliGRAM(s) Oral daily  docusate sodium 100 milliGRAM(s) Oral three times a day  famotidine    Tablet 20 milliGRAM(s) Oral daily  heparin  Injectable 47306 Unit(s) SubCutaneous every 12 hours  insulin lispro (HumaLOG) corrective regimen sliding scale   SubCutaneous three times a day before meals  insulin lispro (HumaLOG) corrective regimen sliding scale   SubCutaneous at bedtime  multivitamin 1 Tablet(s) Oral daily  mycophenolate mofetil 1000 milliGRAM(s) Oral <User Schedule>  nystatin    Suspension 038845 Unit(s) Oral every 6 hours  pravastatin 20 milliGRAM(s) Oral at bedtime  silver sulfADIAZINE 1% Cream 1 Application(s) Topical two times a day  sodium bicarbonate 650 milliGRAM(s) Oral three times a day  tacrolimus 8 milliGRAM(s) Oral <User Schedule>  valGANciclovir 450 milliGRAM(s) Oral <User Schedule>    MEDICATIONS  (PRN):  acetaminophen   Tablet. 650 milliGRAM(s) Oral every 6 hours PRN Mild Pain (1 - 3)    Pertinent Labs:  03-22 Na141 mmol/L Glu 145 mg/dL<H> K+ 4.9 mmol/L Cr  3.06 mg/dL<H> BUN 60 mg/dL<H> 03-22 Alb 3.3 g/dL 03-18 [id number] 5.3 %      Skin:     Estimated Needs:   [ ] no change since previous assessment  [ ] recalculated:       Previous Nutrition Diagnosis:     [ ] Inadequate Energy Intake [ ]Inadequate Oral Intake [ ] Excessive Energy Intake     [ ] Underweight [ ] Increased Nutrient Needs [ ] Overweight/Obesity     [ ] Altered GI Function [ ] Unintended Weight Loss [ ] Food & Nutrition Related Knowledge Deficit [ ] Malnutrition          Nutrition Diagnosis is [ ] ongoing  [ ] resolved [ ] not applicable          New Nutrition Diagnosis: [ ] not applicable    [ ] Inadequate Protein Energy Intake [ ]Inadequate Oral Intake [ ] Excessive Energy Intake     [ ] Underweight [ ] Increased Nutrient Needs [ ] Overweight/Obesity     [ ] Altered GI Function [ ] Unintended Weight Loss [ ] Food & Nutrition Related Knowledge Deficit[ ] Limited Adherence to nutrition related recommendations [ ] Malnutrition  [ ] other: Free text       Related to:      As evidenced by:      Interventions:     Recommend    [ ] Change Diet To:    [ ] Nutrition Supplement    [ ] Nutrition Support    [ ] Other:        Monitoring and Evaluation:     [ ] PO intake [ ] Tolerance to diet prescription [ ] weights [ ] follow up per protocol    [ ] other: Patient seen for Transplant follow-up on 2-[facility]    Chart reviewed - Patient admitted with ACC/AHA stage D chronic systolic HF due to NICM S/P heart transplant (2/23); LVAD explant and AICD removal. 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 ?embolic event to right middle finger - ultrasound showed possible RUE hematoma, underwent cardiac catheter and biopsy (3/20). Noted per ID patient with right lower lobe atelectasis vs. early PNA S/P bronchoscopy - improving. NORMAN likely due to Vanco dosing - vanco stopped - patient improving.     Source: Patient [x]    Family [ ]     other [x] Electronic medical chart review    Diet: Low fiber/residue, no concentrated potassium with Ensure Enlive 3x/daily    Patient reports good appetite and consumption of ~75% of most meals. Patient denies any GI distress, nausea, vomiting, chewing/swallowing issues, diarrhea or constipation. Last BM was today and it was normal.     Current Weight: 3/22 weight is 165.7 pounds, 3/18 weight is 163.1 pounds; 2.6 pound weight gain is likely due to fluid shift.    Pertinent Medications: MEDICATIONS  (STANDING):  ALBUTerol/ipratropium for Nebulization 3 milliLiter(s) Nebulizer every 6 hours  aspirin enteric coated 81 milliGRAM(s) Oral daily  atovaquone Suspension 1500 milliGRAM(s) Oral daily  Calcium Citrate + Vit D, 315 mg/200 Unit 2 Tablet(s) 2 Tablet(s) Oral two times a day  diltiazem    milliGRAM(s) Oral daily  docusate sodium 100 milliGRAM(s) Oral three times a day  famotidine    Tablet 20 milliGRAM(s) Oral daily  heparin  Injectable 68328 Unit(s) SubCutaneous every 12 hours  insulin lispro (HumaLOG) corrective regimen sliding scale   SubCutaneous three times a day before meals  insulin lispro (HumaLOG) corrective regimen sliding scale   SubCutaneous at bedtime  multivitamin 1 Tablet(s) Oral daily  mycophenolate mofetil 1000 milliGRAM(s) Oral <User Schedule>  nystatin    Suspension 275826 Unit(s) Oral every 6 hours  pravastatin 20 milliGRAM(s) Oral at bedtime  silver sulfADIAZINE 1% Cream 1 Application(s) Topical two times a day  sodium bicarbonate 650 milliGRAM(s) Oral three times a day  tacrolimus 8 milliGRAM(s) Oral <User Schedule>  valGANciclovir 450 milliGRAM(s) Oral <User Schedule>    MEDICATIONS  (PRN):  acetaminophen   Tablet. 650 milliGRAM(s) Oral every 6 hours PRN Mild Pain (1 - 3)    Pertinent Labs:    Complete Blood Count (03.22.18 @ 07:09)    WBC Count: 17.8 K/uL    RBC Count: 2.84 M/uL      Comprehensive Metabolic Panel (03.22.18 @ 07:09)    Sodium, Serum: 141 mmol/L    Potassium, Serum: 4.9 mmol/L    Chloride, Serum: 105 mmol/L    Carbon Dioxide, Serum: 20 mmol/L    Anion Gap, Serum: 16 mmol/L    Blood Urea Nitrogen, Serum: 60 mg/dL    Creatinine, Serum: 3.06 mg/dL    Glucose, Serum: 145 mg/dL    Calcium, Total Serum: 8.6 mg/dL    Protein Total, Serum: 6.9 g/dL    Albumin, Serum: 3.3 g/dL    Bilirubin Total, Serum: 0.3 mg/dL    Alkaline Phosphatase, Serum: 101 U/L    Aspartate Aminotransferase (AST/SGOT): 15 U/L    Alanine Aminotransferase (ALT/SGPT): 19 U/L   03-18 [id number] 5.3 %    Skin: +1 edema on the right arm noted and no pressure injuries charted    Estimated Needs:   [x] no change since previous assessment    Previous Nutrition Diagnosis:   [x]Inadequate Oral Intake and Increased Nutrient Needs - addressed with po diet and supplementation  [x] Food & Nutrition Related Knowledge Deficit - Being addressed via continued education and follow-up reinforcement during stay    Nutrition Diagnosis is [x] ongoing     New Nutrition Diagnosis: [x] not applicable    Interventions:   Encourage po intake and supplementation with Ensure Enlive   Provide food preferences as appropriate within the dietary regimen  Reinforced food safety precautions with transplant patients  Reinforced avoidance of high potassium foods    Recommend  [ ] Continue Low Fiber/residue Diet with no concentrated Potassium    [ ] Nutrition Supplement Continue Ensure Enlive 3x/daily: Patient reminded to consume between meals and in the evening post meal times.    [ ] Nutrition Support    [ ] Other:      Monitoring and Evaluation:     [x] PO intake [x] Tolerance to diet prescription [x] weights [x] follow up per protocol    [x] other:    Dietetic intern to remain available for follow up Korey Rivers, dietetic intern, #[phone] Patient seen for Transplant follow-up on 2-[facility]    Chart reviewed - Patient admitted with ACC/AHA stage D chronic systolic HF due to NICM S/P heart transplant (2/23); LVAD explant and AICD removal. 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 ?embolic event to right middle finger - ultrasound showed possible RUE hematoma, underwent cardiac catheter and biopsy (3/20). Noted per ID patient with right lower lobe atelectasis vs. early PNA S/P bronchoscopy - improving. NORMAN likely due to Vanco dosing - vanco stopped - patient improving.     Source: Patient [x]    Family [ ]     other [x] Electronic medical chart review    Diet: Low fiber/residue, no concentrated potassium with Ensure Enlive 3x/daily    Patient reports good appetite and consumption of ~75% of most meals. Patient denies any GI distress, nausea, vomiting, chewing/swallowing issues, diarrhea or constipation. Last BM was today and it was normal. Patient was receptive to continued education and reinforcement. Patient did have questions about foods high in potassium: He queried about potassium levels in porgies and julia fish. Patient was told that these fish were not high in potassium - can discuss in more detail during follow-up.    Current Weight: 3/22 weight is 165.7 pounds, 3/18 weight is 163.1 pounds; 2.6 pound weight gain is likely due to fluid shift.    Pertinent Medications: MEDICATIONS  (STANDING):  ALBUTerol/ipratropium for Nebulization 3 milliLiter(s) Nebulizer every 6 hours  aspirin enteric coated 81 milliGRAM(s) Oral daily  atovaquone Suspension 1500 milliGRAM(s) Oral daily  Calcium Citrate + Vit D, 315 mg/200 Unit 2 Tablet(s) 2 Tablet(s) Oral two times a day  diltiazem    milliGRAM(s) Oral daily  docusate sodium 100 milliGRAM(s) Oral three times a day  famotidine    Tablet 20 milliGRAM(s) Oral daily  heparin  Injectable 59527 Unit(s) SubCutaneous every 12 hours  insulin lispro (HumaLOG) corrective regimen sliding scale   SubCutaneous three times a day before meals  insulin lispro (HumaLOG) corrective regimen sliding scale   SubCutaneous at bedtime  multivitamin 1 Tablet(s) Oral daily  mycophenolate mofetil 1000 milliGRAM(s) Oral <User Schedule>  nystatin    Suspension 365334 Unit(s) Oral every 6 hours  pravastatin 20 milliGRAM(s) Oral at bedtime  silver sulfADIAZINE 1% Cream 1 Application(s) Topical two times a day  sodium bicarbonate 650 milliGRAM(s) Oral three times a day  tacrolimus 8 milliGRAM(s) Oral <User Schedule>  valGANciclovir 450 milliGRAM(s) Oral <User Schedule>    MEDICATIONS  (PRN):  acetaminophen   Tablet. 650 milliGRAM(s) Oral every 6 hours PRN Mild Pain (1 - 3)    Pertinent Labs:    Complete Blood Count (03.22.18 @ 07:09)    WBC Count: 17.8 K/uL    RBC Count: 2.84 M/uL      Comprehensive Metabolic Panel (03.22.18 @ 07:09)    Sodium, Serum: 141 mmol/L    Potassium, Serum: 4.9 mmol/L    Chloride, Serum: 105 mmol/L    Carbon Dioxide, Serum: 20 mmol/L    Anion Gap, Serum: 16 mmol/L    Blood Urea Nitrogen, Serum: 60 mg/dL    Creatinine, Serum: 3.06 mg/dL    Glucose, Serum: 145 mg/dL    Calcium, Total Serum: 8.6 mg/dL    Protein Total, Serum: 6.9 g/dL    Albumin, Serum: 3.3 g/dL    Bilirubin Total, Serum: 0.3 mg/dL    Alkaline Phosphatase, Serum: 101 U/L    Aspartate Aminotransferase (AST/SGOT): 15 U/L    Alanine Aminotransferase (ALT/SGPT): 19 U/L   03-18 [id number] 5.3 %    Skin: +1 edema on the right arm noted and no pressure injuries charted    Estimated Needs:   [x] no change since previous assessment    Previous Nutrition Diagnosis:   [x]Inadequate Oral Intake and Increased Nutrient Needs - addressed with po diet and supplementation  [x] Food & Nutrition Related Knowledge Deficit - Being addressed via continued education and follow-up reinforcement during stay    Nutrition Diagnosis is [x] ongoing     New Nutrition Diagnosis: [x] not applicable    Interventions:   Encourage po intake and supplementation with Ensure Enlive   Provide food preferences as appropriate within the dietary regimen  Reinforced food safety precautions with transplant patients  Reinforced avoidance of high potassium foods    Recommend  [ ] Continue Low Fiber/residue Diet with no concentrated Potassium    [ ] Nutrition Supplement Continue Ensure Enlive 3x/daily: Patient reminded to consume between meals and in the evening post meal times.    [ ] Nutrition Support    [ ] Other:      Monitoring and Evaluation:     [x] PO intake [x] Tolerance to diet prescription [x] weights [x] follow up per protocol    [x] other:    Dietetic intern to remain available for follow up Korey Rivers, dietetic intern, #[phone] Patient seen for Transplant follow-up on 2-[facility]    Chart reviewed - Patient admitted with ACC/AHA stage D chronic systolic HF due to NICM S/P heart transplant (2/23); LVAD explant and AICD removal. 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 ?embolic event to right middle finger - ultrasound showed possible RUE hematoma, underwent cardiac catheter and biopsy (3/20). Noted per ID patient with right lower lobe atelectasis vs. early PNA S/P bronchoscopy - improving. NORMAN likely due to Vanco dosing - vanco stopped - patient improving.     Source: Patient [x]    Family [ ]     other [x] Electronic medical chart review    Diet: Low fiber/residue, no concentrated potassium with Ensure Enlive 3x/daily    Patient reports good appetite and consumption of ~75% of most meals. Patient denies any GI distress, nausea, vomiting, chewing/swallowing issues, diarrhea or constipation. Last BM was today and it was normal. Patient was receptive to continued education and reinforcement. Patient did have questions about foods high in potassium: He queried about potassium levels in porgies and julia fish. Patient was told that these fish were not high in potassium - can discuss in more detail during follow-up.    Current Weight: 3/22 weight is 165.7 pounds, 3/18 weight is 163.1 pounds; 2.6 pound weight gain is likely due to fluid shift.    Pertinent Medications: MEDICATIONS  (STANDING):  ALBUTerol/ipratropium for Nebulization 3 milliLiter(s) Nebulizer every 6 hours  aspirin enteric coated 81 milliGRAM(s) Oral daily  atovaquone Suspension 1500 milliGRAM(s) Oral daily  Calcium Citrate + Vit D, 315 mg/200 Unit 2 Tablet(s) 2 Tablet(s) Oral two times a day  diltiazem    milliGRAM(s) Oral daily  docusate sodium 100 milliGRAM(s) Oral three times a day  famotidine    Tablet 20 milliGRAM(s) Oral daily  heparin  Injectable 37118 Unit(s) SubCutaneous every 12 hours  insulin lispro (HumaLOG) corrective regimen sliding scale   SubCutaneous three times a day before meals  insulin lispro (HumaLOG) corrective regimen sliding scale   SubCutaneous at bedtime  multivitamin 1 Tablet(s) Oral daily  mycophenolate mofetil 1000 milliGRAM(s) Oral <User Schedule>  nystatin    Suspension 198357 Unit(s) Oral every 6 hours  pravastatin 20 milliGRAM(s) Oral at bedtime  silver sulfADIAZINE 1% Cream 1 Application(s) Topical two times a day  sodium bicarbonate 650 milliGRAM(s) Oral three times a day  tacrolimus 8 milliGRAM(s) Oral <User Schedule>  valGANciclovir 450 milliGRAM(s) Oral <User Schedule>    MEDICATIONS  (PRN):  acetaminophen   Tablet. 650 milliGRAM(s) Oral every 6 hours PRN Mild Pain (1 - 3)    Pertinent Labs:    Complete Blood Count (03.22.18 @ 07:09)    Hemoglobin: 8.5 g/dL <L>    Hematocrit: 26.1 % <L>    Comprehensive Metabolic Panel (03.22.18 @ 07:09)    Sodium, Serum: 141 mmol/L    Potassium, Serum: 4.9 mmol/L    Chloride, Serum: 105 mmol/L    Carbon Dioxide, Serum: 20 mmol/L    Anion Gap, Serum: 16 mmol/L    Blood Urea Nitrogen, Serum: 60 mg/dL <H>    Creatinine, Serum: 3.06 mg/dL <H>    Glucose, Serum: 145 mg/dL <H>    Calcium, Total Serum: 8.6 mg/dL    Protein Total, Serum: 6.9 g/dL    Albumin, Serum: 3.3 g/dL    Bilirubin Total, Serum: 0.3 mg/dL    Alkaline Phosphatase, Serum: 101 U/L    Aspartate Aminotransferase (AST/SGOT): 15 U/L    Alanine Aminotransferase (ALT/SGPT): 19 U/L   03-18 [id number] 5.3 %    Skin: +1 edema on the right arm noted and no pressure injuries charted    Estimated Needs:   [x] no change since previous assessment    Previous Nutrition Diagnosis:   [x]Inadequate Oral Intake and Increased Nutrient Needs - addressed with po diet and supplementation  [x] Food & Nutrition Related Knowledge Deficit - Being addressed via continued education and follow-up reinforcement during stay    Nutrition Diagnosis is [x] ongoing     New Nutrition Diagnosis: [x] not applicable    Interventions:   Encourage po intake and supplementation with Ensure Enlive   Provide food preferences as appropriate within the dietary regimen  Reinforced food safety precautions with transplant patients  Reinforced avoidance of high potassium foods  Reviewed post transplant food and drug interactions    Recommend  [x] Continue Low Fiber/residue Diet with no concentrated Potassium    [x] Nutrition Supplement Continue Ensure Enlive 3x/daily: Patient reminded to consume between meals and in the evening post meal times.    [ ] Nutrition Support    [ ] Other:      Monitoring and Evaluation:     [x] PO intake [x] Tolerance to diet prescription [x] weights [x] follow up per protocol    [x] other:    Dietetic intern to remain available for follow up Korey Rivers, dietetic intern, #[phone]

## 2018-03-22 NOTE — PROGRESS NOTE ADULT - ASSESSMENT
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 Renal reconsulted for elevated cr. It seems cr has been stable at 0.9 - 1.1 but on 3/14 it was 1.2 and rakan to mid 3 and now downtrending last 48 hours. Most likely this was vancomycin induced cast nephropathy that is resolving in setting of perhaps some component of vasoconstriction from tacrolimus.

## 2018-03-22 NOTE — PROGRESS NOTE ADULT - ASSESSMENT
Mr. Baez is a 67 year old man with ACC/AHA stage D chronic systolic heart failure due to NICM s/p HM2 LVAD 6/17 c/b pump thrombosis now s/p heart transplant on 2/23 (CMV D-/R+ and Toxo D-/R+), with post-op course c/b primary graft dysfunction, initially thought to be immune-mediated due to positive B-cell flow (negative CDC cross match) and received plasmapharesis/IVIg x2 with improvement in LV function. His course has been complicated by bilateral IJ/subclavian thrombi. He currently has acute renal failure of unclear etiology. However, he does not have clear evidence of allograft dysfunction or low output on exam. 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     Plan discussed with 2 [facility]án[facility] NP team and in multidisciplinary rounds.     #Heart Transplant  EMB #1: 1R/1A, AMR 1. DSA negative.  EMB #2: 1R/1A, AMR 1. DSA negative.  EMB #3: 1R/1A. stable AMR1. No DSA. RA 12, PA 40/16/27, PCW 11, PA 67%, CO/CI 6.8/3.6. TTE with mild RV enlargement and dysfunction.  EMB #4: 1R/2A with RA 10, PA 39/18, Wedge 18, CO/CI 6/3.2. No effusion on TTE.     # Immunosuppression:  Tacrolimus -  Will continue dosing at 8 mg BID (increased yesterday). [place] level 9.4 this morning from 10.9. Target trough level of 12-14.   CellCept - continue 1000 mg PO BID.   Steroids - will give short pulse of oral steroids. Will give 50 mg PO BID for three days with subsequent taper.     #Hypertension  - Continue cardizem 120 mg daily.   	  # Antimicrobial prophylaxis:  Intermediate risk CMV - Valcyte for NORMAN to 450 mg daily just TWICE weekly.   Intermediate risk Toxo - continue Mepron 1500mg PO daily with food.  PCP - continue Mepron 1500mg PO daily  Thrush - continue Nystatin S/S 5 mL QID    # Bilateral IJ/Subclavian thrombi - unchanged on f/u U/S 3/12  - lovenox switched to heparin 00694 units subcutaneous Q12 (250 units/kg Q12) per vasc cards recs in setting of renal function. Please hold 3/19 PM and 3/20 AM dose for RHC.   - CHERIE negative    #Acute renal failure, possibly related to vancomycin dosing, improving.   - Vanco stopped.   - He responded to diuretics given on Friday. No further diuresis.     # CAV PPx  - Continue ECASA 81mg PO daily  - Continue pravastatin 20mg PO QHS    # ID   - Pseudomonas in sputum - course of cefepime completed 3/15  - DLES improved and vancomycin stopped given high trough with NORAMN. Per ID, will redose with daptomycin depending upon repeat vanc level this morning.   - silver sulfadizaine dressings per plastics for R 3rd digit.   - HCV viral load and PCR per protocol. Therapy to be initiated as outpatient by Dr. Thomas (Hepatology)  - Transplant ID following    #endo  - BG currently well controlled, ranging 103-157  - SSI AC & HS    # Additional prevention:  - Citracal + D 2 tabs PO BID  - multivitamin 1 tab daily  - Stress ulcer ppx while on steroids: will change pantoprazole to ranitidine per renal recommendations.  - Mag oxide discontinued currently. Mag 2.3 this AM.    #dispo  - Ongoing work with PT for ongoing exercise tolerance prior to d/c

## 2018-03-22 NOTE — PROGRESS NOTE ADULT - SUBJECTIVE AND OBJECTIVE BOX
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  got high dose steroids  crt downtrending      PAST HISTORY  --------------------------------------------------------------------------------  No significant changes to PMH, PSH, FHx, SHx, unless otherwise noted    ALLERGIES & MEDICATIONS  --------------------------------------------------------------------------------  Allergies    No Known Allergies    Intolerances      Standing Inpatient Medications  ALBUTerol/ipratropium for Nebulization 3 milliLiter(s) Nebulizer every 6 hours  aspirin enteric coated 81 milliGRAM(s) Oral daily  atovaquone Suspension 1500 milliGRAM(s) Oral daily  Calcium Citrate + Vit D, 315 mg/200 Unit 2 Tablet(s) 2 Tablet(s) Oral two times a day  diltiazem    milliGRAM(s) Oral daily  docusate sodium 100 milliGRAM(s) Oral three times a day  famotidine    Tablet 20 milliGRAM(s) Oral daily  heparin  Injectable 38935 Unit(s) SubCutaneous every 12 hours  insulin lispro (HumaLOG) corrective regimen sliding scale   SubCutaneous three times a day before meals  insulin lispro (HumaLOG) corrective regimen sliding scale   SubCutaneous at bedtime  multivitamin 1 Tablet(s) Oral daily  mycophenolate mofetil 1000 milliGRAM(s) Oral <User Schedule>  nystatin    Suspension 389038 Unit(s) Oral every 6 hours  pravastatin 20 milliGRAM(s) Oral at bedtime  silver sulfADIAZINE 1% Cream 1 Application(s) Topical two times a day  sodium bicarbonate 650 milliGRAM(s) Oral three times a day  tacrolimus 8 milliGRAM(s) Oral <User Schedule>  valGANciclovir 450 milliGRAM(s) Oral <User Schedule>    PRN Inpatient Medications  acetaminophen   Tablet. 650 milliGRAM(s) Oral every 6 hours PRN      REVIEW OF SYSTEMS  --------------------------------------------------------------------------------  Gen: No weight changes, fatigue, fevers/chills, weakness  Skin: No rashes  Head/Eyes/Ears/Mouth: No headache; Normal hearing; Normal vision w/o blurriness; No sinus pain/discomfort, sore throat  Respiratory: No dyspnea, cough, wheezing, hemoptysis  CV: No chest pain, PND, orthopnea  GI: No abdominal pain, diarrhea, constipation, nausea, vomiting, melena, hematochezia  : No increased frequency, dysuria, hematuria, nocturia  MSK: No joint pain/swelling; no back pain; no edema  Neuro: No dizziness/lightheadedness, weakness, seizures, numbness, tingling  Heme: No easy bruising or bleeding    All other systems were reviewed and are negative, except as noted.    VITALS/PHYSICAL EXAM  --------------------------------------------------------------------------------  T(C): 36.1 (03-22-18 @ 11:40), Max: 37 (03-21-18 @ 19:31)  HR: 108 (03-22-18 @ 11:40) (104 - 115)  BP: 123/77 (03-22-18 @ 11:40) (114/80 - 129/86)  RR: 18 (03-22-18 @ 11:40) (18 - 18)  SpO2: 99% (03-22-18 @ 11:40) (96% - 99%)  Wt(kg): --        03-21-18 @ 07:01  -  03-22-18 @ 07:00  --------------------------------------------------------  IN: 990 mL / OUT: 1000 mL / NET: -10 mL    03-22-18 @ 07:01  -  03-22-18 @ 12:45  --------------------------------------------------------  IN: 610 mL / OUT: 525 mL / NET: 85 mL      Physical Exam:  	Gen: NAD, well-appearing  	HEENT: PERRL, supple neck, clear oropharynx  	Pulm: CTA B/L  	CV: RRR, S1S2; no rub  	Abd: +BS, soft, nontender/nondistended  	LE: Warm, FROM, no clubbing, intact strength; no edema  	Skin: Warm, without rashes    LABS/STUDIES  --------------------------------------------------------------------------------              8.5    17.8  >-----------<  492      [03-22-18 @ 07:09]              26.1     141  |  105  |  60  ----------------------------<  145      [03-22-18 @ 07:09]  4.9   |  20  |  3.06        Ca     8.6     [03-22-18 @ 07:09]      Mg     2.0     [03-22-18 @ 07:09]    TPro  6.9  /  Alb  3.3  /  TBili  0.3  /  DBili  x   /  AST  15  /  ALT  19  /  AlkPhos  101  [03-22-18 @ 07:09]    PT/INR: PT 12.9 , INR 1.19       [03-22-18 @ 07:10]  PTT: 60.8       [03-22-18 @ 07:10]      Creatinine Trend:  SCr 3.06 [03-22 @ 07:09]  SCr 3.16 [03-21 @ 21:04]  SCr 3.40 [03-21 @ 07:16]  SCr 3.46 [03-21 @ 04:42]  SCr 3.79 [03-20 @ 07:16]    Urinalysis - [03-18-18 @ 15:52]      Color Yellow / Appearance SL Turbid / SG 1.018 / pH 6.0      Gluc Negative / Ketone Negative  / Bili Negative / Urobili Negative       Blood Small / Protein 100 / Leuk Est Negative / Nitrite Negative      RBC 0-2 / WBC 0-2 / Hyaline 2-5 / Gran  / Sq Epi  / Non Sq Epi  / Bacteria Few    Urine Creatinine 101      [03-19-18 @ 20:01]  Urine Protein 89      [03-19-18 @ 20:01]  Urine Sodium 35      [03-18-18 @ 15:52]  Urine Urea Nitrogen 618      [03-18-18 @ 18:40]  Urine Potassium 26      [03-18-18 @ 15:52]  Urine Chloride <35      [03-18-18 @ 15:52]    Iron 22, TIBC 182, %sat 12      [02-13-18 @ 12:44]  Ferritin 79.0      [02-13-18 @ 12:44]  HbA1c 5.3      [03-18-18 @ 11:20]  TSH 1.48      [02-27-18 @ 08:13]  Lipid: chol 62, TG 33, HDL 17, LDL 38      [06-07-17 @ 06:14]      C3 Complement 135      [03-19-18 @ 15:13]  C4 Complement 37      [03-19-18 @ 15:13]  Free Light Chains: kappa 4.36, lambda 5.71, ratio = 0.76      [03-19 @ 15:13]  Immunofixation Serum:   No Monoclonal Band Identified      [03-19-18 @ 15:13]

## 2018-03-22 NOTE — PROGRESS NOTE ADULT - PROBLEM SELECTOR PLAN 5
renal following  improving kidney function  possibly due to IV Vancomycin which has been discontinued

## 2018-03-22 NOTE — PROGRESS NOTE ADULT - ATTENDING COMMENTS
2. Hyperkalemia: NORMAN related but could be secondary to tacrolimus as well. Started bicarb last evening. K this AM is normalized. If K>5.5, can give kayxalate 15gm again. Low K diet preferred        Sarkis Hutchins MD  Cell   Pager   Office

## 2018-03-23 LAB
ALBUMIN SERPL ELPH-MCNC: 3.3 G/DL — SIGNIFICANT CHANGE UP (ref 3.3–5)
ALP SERPL-CCNC: 96 U/L — SIGNIFICANT CHANGE UP (ref 40–120)
ALT FLD-CCNC: 21 U/L RC — SIGNIFICANT CHANGE UP (ref 10–45)
ANION GAP SERPL CALC-SCNC: 13 MMOL/L — SIGNIFICANT CHANGE UP (ref 5–17)
APPEARANCE UR: ABNORMAL
APTT BLD: 30.3 SEC — SIGNIFICANT CHANGE UP (ref 27.5–37.4)
APTT BLD: 33 SEC — SIGNIFICANT CHANGE UP (ref 27.5–37.4)
AST SERPL-CCNC: 14 U/L — SIGNIFICANT CHANGE UP (ref 10–40)
BILIRUB SERPL-MCNC: 0.3 MG/DL — SIGNIFICANT CHANGE UP (ref 0.2–1.2)
BILIRUB UR-MCNC: NEGATIVE — SIGNIFICANT CHANGE UP
BUN SERPL-MCNC: 59 MG/DL — HIGH (ref 7–23)
CALCIUM SERPL-MCNC: 9.1 MG/DL — SIGNIFICANT CHANGE UP (ref 8.4–10.5)
CHLORIDE SERPL-SCNC: 104 MMOL/L — SIGNIFICANT CHANGE UP (ref 96–108)
CO2 SERPL-SCNC: 23 MMOL/L — SIGNIFICANT CHANGE UP (ref 22–31)
COLOR SPEC: YELLOW — SIGNIFICANT CHANGE UP
CREAT SERPL-MCNC: 3.03 MG/DL — HIGH (ref 0.5–1.3)
DIFF PNL FLD: ABNORMAL
GLUCOSE BLDC GLUCOMTR-MCNC: 123 MG/DL — HIGH (ref 70–99)
GLUCOSE BLDC GLUCOMTR-MCNC: 161 MG/DL — HIGH (ref 70–99)
GLUCOSE BLDC GLUCOMTR-MCNC: 174 MG/DL — HIGH (ref 70–99)
GLUCOSE BLDC GLUCOMTR-MCNC: 178 MG/DL — HIGH (ref 70–99)
GLUCOSE SERPL-MCNC: 118 MG/DL — HIGH (ref 70–99)
GLUCOSE UR QL: NEGATIVE MG/DL — SIGNIFICANT CHANGE UP
HCT VFR BLD CALC: 25.4 % — LOW (ref 39–50)
HGB BLD-MCNC: 8.1 G/DL — LOW (ref 13–17)
INR BLD: 1.12 RATIO — SIGNIFICANT CHANGE UP (ref 0.88–1.16)
INR BLD: 1.13 RATIO — SIGNIFICANT CHANGE UP (ref 0.88–1.16)
KETONES UR-MCNC: NEGATIVE — SIGNIFICANT CHANGE UP
LEUKOCYTE ESTERASE UR-ACNC: ABNORMAL
MAGNESIUM SERPL-MCNC: 2 MG/DL — SIGNIFICANT CHANGE UP (ref 1.6–2.6)
MCHC RBC-ENTMCNC: 29.4 PG — SIGNIFICANT CHANGE UP (ref 27–34)
MCHC RBC-ENTMCNC: 31.9 GM/DL — LOW (ref 32–36)
MCV RBC AUTO: 92 FL — SIGNIFICANT CHANGE UP (ref 80–100)
NITRITE UR-MCNC: NEGATIVE — SIGNIFICANT CHANGE UP
PH UR: 5 — SIGNIFICANT CHANGE UP (ref 5–8)
PLATELET # BLD AUTO: 478 K/UL — HIGH (ref 150–400)
POTASSIUM SERPL-MCNC: 4.7 MMOL/L — SIGNIFICANT CHANGE UP (ref 3.5–5.3)
POTASSIUM SERPL-SCNC: 4.7 MMOL/L — SIGNIFICANT CHANGE UP (ref 3.5–5.3)
PROT SERPL-MCNC: 6.6 G/DL — SIGNIFICANT CHANGE UP (ref 6–8.3)
PROT UR-MCNC: 30 MG/DL
PROTHROM AB SERPL-ACNC: 12.1 SEC — SIGNIFICANT CHANGE UP (ref 9.8–12.7)
PROTHROM AB SERPL-ACNC: 12.3 SEC — SIGNIFICANT CHANGE UP (ref 9.8–12.7)
RBC # BLD: 2.76 M/UL — LOW (ref 4.2–5.8)
RBC # FLD: 16.7 % — HIGH (ref 10.3–14.5)
SODIUM SERPL-SCNC: 140 MMOL/L — SIGNIFICANT CHANGE UP (ref 135–145)
SP GR SPEC: 1.02 — SIGNIFICANT CHANGE UP (ref 1.01–1.02)
TACROLIMUS SERPL-MCNC: 9.7 NG/ML — SIGNIFICANT CHANGE UP
UROBILINOGEN FLD QL: NEGATIVE MG/DL — SIGNIFICANT CHANGE UP
WBC # BLD: 17.5 K/UL — HIGH (ref 3.8–10.5)
WBC # FLD AUTO: 17.5 K/UL — HIGH (ref 3.8–10.5)

## 2018-03-23 PROCEDURE — 99232 SBSQ HOSP IP/OBS MODERATE 35: CPT

## 2018-03-23 PROCEDURE — 71045 X-RAY EXAM CHEST 1 VIEW: CPT | Mod: 26

## 2018-03-23 PROCEDURE — 99233 SBSQ HOSP IP/OBS HIGH 50: CPT

## 2018-03-23 RX ORDER — DILTIAZEM HCL 120 MG
180 CAPSULE, EXT RELEASE 24 HR ORAL DAILY
Qty: 0 | Refills: 0 | Status: DISCONTINUED | OUTPATIENT
Start: 2018-03-23 | End: 2018-03-24

## 2018-03-23 RX ORDER — TACROLIMUS 5 MG/1
9 CAPSULE ORAL
Qty: 0 | Refills: 0 | Status: DISCONTINUED | OUTPATIENT
Start: 2018-03-23 | End: 2018-03-26

## 2018-03-23 RX ORDER — HEPARIN SODIUM 5000 [USP'U]/ML
11800 INJECTION INTRAVENOUS; SUBCUTANEOUS EVERY 12 HOURS
Qty: 0 | Refills: 0 | Status: DISCONTINUED | OUTPATIENT
Start: 2018-03-23 | End: 2018-03-24

## 2018-03-23 RX ADMIN — Medication 2: at 19:43

## 2018-03-23 RX ADMIN — Medication 1 TABLET(S): at 11:33

## 2018-03-23 RX ADMIN — Medication 650 MILLIGRAM(S): at 06:51

## 2018-03-23 RX ADMIN — MYCOPHENOLATE MOFETIL 1000 MILLIGRAM(S): 250 CAPSULE ORAL at 19:58

## 2018-03-23 RX ADMIN — Medication 650 MILLIGRAM(S): at 00:25

## 2018-03-23 RX ADMIN — Medication 500000 UNIT(S): at 06:51

## 2018-03-23 RX ADMIN — Medication 2: at 11:33

## 2018-03-23 RX ADMIN — MYCOPHENOLATE MOFETIL 1000 MILLIGRAM(S): 250 CAPSULE ORAL at 08:05

## 2018-03-23 RX ADMIN — Medication 500000 UNIT(S): at 18:12

## 2018-03-23 RX ADMIN — Medication 50 MILLIGRAM(S): at 06:51

## 2018-03-23 RX ADMIN — Medication 3 MILLILITER(S): at 19:44

## 2018-03-23 RX ADMIN — TACROLIMUS 8 MILLIGRAM(S): 5 CAPSULE ORAL at 08:04

## 2018-03-23 RX ADMIN — Medication 650 MILLIGRAM(S): at 14:22

## 2018-03-23 RX ADMIN — Medication 650 MILLIGRAM(S): at 22:00

## 2018-03-23 RX ADMIN — Medication 50 MILLIGRAM(S): at 18:13

## 2018-03-23 RX ADMIN — HEPARIN SODIUM 11800 UNIT(S): 5000 INJECTION INTRAVENOUS; SUBCUTANEOUS at 11:11

## 2018-03-23 RX ADMIN — TACROLIMUS 9 MILLIGRAM(S): 5 CAPSULE ORAL at 19:57

## 2018-03-23 RX ADMIN — Medication 3 MILLILITER(S): at 06:51

## 2018-03-23 RX ADMIN — Medication 1 APPLICATION(S): at 18:13

## 2018-03-23 RX ADMIN — Medication 650 MILLIGRAM(S): at 07:15

## 2018-03-23 RX ADMIN — Medication 120 MILLIGRAM(S): at 06:51

## 2018-03-23 RX ADMIN — FAMOTIDINE 20 MILLIGRAM(S): 10 INJECTION INTRAVENOUS at 11:09

## 2018-03-23 RX ADMIN — Medication 81 MILLIGRAM(S): at 11:10

## 2018-03-23 RX ADMIN — Medication 100 MILLIGRAM(S): at 14:22

## 2018-03-23 RX ADMIN — Medication 1 APPLICATION(S): at 08:06

## 2018-03-23 RX ADMIN — ATOVAQUONE 1500 MILLIGRAM(S): 750 SUSPENSION ORAL at 11:09

## 2018-03-23 RX ADMIN — Medication 500000 UNIT(S): at 11:10

## 2018-03-23 RX ADMIN — Medication 3 MILLILITER(S): at 14:00

## 2018-03-23 RX ADMIN — Medication 180 MILLIGRAM(S): at 18:28

## 2018-03-23 RX ADMIN — Medication 20 MILLIGRAM(S): at 22:01

## 2018-03-23 NOTE — PROGRESS NOTE ADULT - SUBJECTIVE AND OBJECTIVE BOX
[facility] DIVISION OF KIDNEY DISEASES AND HYPERTENSION -- FOLLOW UP NOTE  --------------------------------------------------------------------------------  Chief Complaint: NORMAN    24 hour events/subjective:  no changes      PAST HISTORY  --------------------------------------------------------------------------------  No significant changes to PMH, PSH, FHx, SHx, unless otherwise noted    ALLERGIES & MEDICATIONS  --------------------------------------------------------------------------------  Allergies    No Known Allergies    Intolerances      Standing Inpatient Medications  ALBUTerol/ipratropium for Nebulization 3 milliLiter(s) Nebulizer every 6 hours  aspirin enteric coated 81 milliGRAM(s) Oral daily  atovaquone Suspension 1500 milliGRAM(s) Oral daily  Calcium Citrate + Vit D, 315 mg/200 Unit 2 Tablet(s) 2 Tablet(s) Oral two times a day  diltiazem    milliGRAM(s) Oral daily  docusate sodium 100 milliGRAM(s) Oral three times a day  famotidine    Tablet 20 milliGRAM(s) Oral daily  heparin  Injectable 69799 Unit(s) SubCutaneous every 12 hours  insulin lispro (HumaLOG) corrective regimen sliding scale   SubCutaneous three times a day before meals  insulin lispro (HumaLOG) corrective regimen sliding scale   SubCutaneous at bedtime  multivitamin 1 Tablet(s) Oral daily  mycophenolate mofetil 1000 milliGRAM(s) Oral <User Schedule>  nystatin    Suspension 548510 Unit(s) Oral every 6 hours  pravastatin 20 milliGRAM(s) Oral at bedtime  predniSONE   Tablet 50 milliGRAM(s) Oral every 12 hours  silver sulfADIAZINE 1% Cream 1 Application(s) Topical two times a day  sodium bicarbonate 650 milliGRAM(s) Oral three times a day  tacrolimus 8 milliGRAM(s) Oral <User Schedule>  valGANciclovir 450 milliGRAM(s) Oral <User Schedule>    PRN Inpatient Medications  acetaminophen   Tablet. 650 milliGRAM(s) Oral every 6 hours PRN      REVIEW OF SYSTEMS  --------------------------------------------------------------------------------  Gen: No weight changes, fatigue, fevers/chills, weakness  Skin: No rashes  Respiratory: No dyspnea, cough, wheezing, hemoptysis  CV: No chest pain, PND, orthopnea  GI: No abdominal pain, diarrhea, constipation, nausea, vomiting, melena, hematochezia  : No increased frequency, dysuria, hematuria, nocturia  Neuro: No dizziness/lightheadedness, weakness, seizures, numbness, tingling  Heme: No easy bruising or bleeding  Endo: No heat/cold intolerance  Psych: No significant nervousness, anxiety, stress, depression    All other systems were reviewed and are negative, except as noted.    VITALS/PHYSICAL EXAM  --------------------------------------------------------------------------------  T(C): 36.1 (03-23-18 @ 07:40), Max: 37 (03-22-18 @ 19:07)  HR: 110 (03-23-18 @ 07:40) (104 - 114)  BP: 137/88 (03-23-18 @ 07:40) (121/84 - 137/88)  RR: 18 (03-23-18 @ 07:40) (18 - 18)  SpO2: 98% (03-23-18 @ 07:40) (98% - 99%)  Wt(kg): --        03-22-18 @ 07:01  -  03-23-18 @ 07:00  --------------------------------------------------------  IN: 730 mL / OUT: 1025 mL / NET: -295 mL    03-23-18 @ 07:01  -  03-23-18 @ 11:40  --------------------------------------------------------  IN: 360 mL / OUT: 300 mL / NET: 60 mL      Physical Exam:  	Gen: NAD, well-appearing  	HEENT: PERRL, supple neck, clear oropharynx  	Pulm: CTA B/L  	CV: RRR, S1S2; no rub  	Back: No spinal or CVA tenderness; no sacral edema  	Abd: +BS, soft, nontender/nondistended  	LE: Warm, FROM, no clubbing, intact strength; no edema  	Neuro: No focal deficits, tremors noted  	    LABS/STUDIES  --------------------------------------------------------------------------------              8.1    17.5  >-----------<  478      [03-23-18 @ 07:20]              25.4     140  |  104  |  59  ----------------------------<  118      [03-23-18 @ 07:20]  4.7   |  23  |  3.03        Ca     9.1     [03-23-18 @ 07:20]      Mg     2.0     [03-23-18 @ 07:20]    TPro  6.6  /  Alb  3.3  /  TBili  0.3  /  DBili  x   /  AST  14  /  ALT  21  /  AlkPhos  96  [03-23-18 @ 07:20]    PT/INR: PT 12.3 , INR 1.13       [03-23-18 @ 07:20]  PTT: 33.0       [03-23-18 @ 07:20]      Creatinine Trend:  SCr 3.03 [03-23 @ 07:20]  SCr 3.06 [03-22 @ 07:09]  SCr 3.16 [03-21 @ 21:04]  SCr 3.40 [03-21 @ 07:16]  SCr 3.46 [03-21 @ 04:42]    Urinalysis - [03-18-18 @ 15:52]      Color Yellow / Appearance SL Turbid / SG 1.018 / pH 6.0      Gluc Negative / Ketone Negative  / Bili Negative / Urobili Negative       Blood Small / Protein 100 / Leuk Est Negative / Nitrite Negative      RBC 0-2 / WBC 0-2 / Hyaline 2-5 / Gran  / Sq Epi  / Non Sq Epi  / Bacteria Few    Urine Creatinine 101      [03-19-18 @ 20:01]  Urine Protein 89      [03-19-18 @ 20:01]  Urine Sodium 35      [03-18-18 @ 15:52]  Urine Urea Nitrogen 618      [03-18-18 @ 18:40]  Urine Potassium 26      [03-18-18 @ 15:52]  Urine Chloride <35      [03-18-18 @ 15:52]    Iron 22, TIBC 182, %sat 12      [02-13-18 @ 12:44]  Ferritin 79.0      [02-13-18 @ 12:44]  HbA1c 5.3      [03-18-18 @ 11:20]  TSH 1.48      [02-27-18 @ 08:13]  Lipid: chol 62, TG 33, HDL 17, LDL 38      [06-07-17 @ 06:14]      C3 Complement 135      [03-19-18 @ 15:13]  C4 Complement 37      [03-19-18 @ 15:13]  Free Light Chains: kappa 4.36, lambda 5.71, ratio = 0.76      [03-19 @ 15:13]  Immunofixation Serum:   No Monoclonal Band Identified      [03-19-18 @ 15:13]  Cryoglobulin: Negative      [03-19-18 @ 15:13]

## 2018-03-23 NOTE — PROGRESS NOTE ADULT - SUBJECTIVE AND OBJECTIVE BOX
VASCULAR MEDICINE                         CC:  UED[facility]    INTERVAL HISTORY:     No CP or SOB. R arm without symptoms, currently wrapped with ACE    Allergies    No Known Allergies      MEDICATIONS  (STANDING):  ALBUTerol/ipratropium for Nebulization 3 milliLiter(s) Nebulizer every 6 hours  aspirin enteric coated 81 milliGRAM(s) Oral daily  atovaquone Suspension 1500 milliGRAM(s) Oral daily  Calcium Citrate + Vit D, 315 mg/200 Unit 2 Tablet(s) 2 Tablet(s) Oral two times a day  diltiazem    milliGRAM(s) Oral daily  docusate sodium 100 milliGRAM(s) Oral three times a day  famotidine    Tablet 20 milliGRAM(s) Oral daily  heparin  Injectable 51181 Unit(s) SubCutaneous every 12 hours  insulin lispro (HumaLOG) corrective regimen sliding scale   SubCutaneous three times a day before meals  insulin lispro (HumaLOG) corrective regimen sliding scale   SubCutaneous at bedtime  multivitamin 1 Tablet(s) Oral daily  mycophenolate mofetil 1000 milliGRAM(s) Oral <User Schedule>  nystatin    Suspension 622463 Unit(s) Oral every 6 hours  pravastatin 20 milliGRAM(s) Oral at bedtime  predniSONE   Tablet 50 milliGRAM(s) Oral every 12 hours  silver sulfADIAZINE 1% Cream 1 Application(s) Topical two times a day  sodium bicarbonate 650 milliGRAM(s) Oral three times a day  tacrolimus 8 milliGRAM(s) Oral <User Schedule>  valGANciclovir 450 milliGRAM(s) Oral <User Schedule>    MEDICATIONS  (PRN):  acetaminophen   Tablet. 650 milliGRAM(s) Oral every 6 hours PRN Mild Pain (1 - 3)  SOCIAL HISTORY:  unchanged    REVIEW OF SYSTEMS:  CONSTITUTIONAL: No fever, weight loss, or fatigue  EYES: No eye pain, visual disturbances, or discharge  ENMT:  No difficulty hearing, tinnitus, vertigo; No sinus or throat pain  NECK: No pain or stiffness  RESPIRATORY: No cough, wheezing, chills or hemoptysis; No Shortness of Breath  CARDIOVASCULAR: No chest pain, palpitations, passing out, dizziness, or leg swelling  GASTROINTESTINAL: No abdominal or epigastric pain. No nausea, vomiting, or hematemesis; No diarrhea or constipation. No melena or hematochezia.  GENITOURINARY: No dysuria, frequency, hematuria, or incontinence  NEUROLOGICAL: No headaches, memory loss, loss of strength, numbness, or tremors  SKIN: No itching, burning, rashes, or lesions   LYMPH Nodes: No enlarged glands  ENDOCRINE: No heat or cold intolerance; No hair loss  MUSCULOSKELETAL: No joint pain or swelling; No muscle, back, or extremity pain  PSYCHIATRIC: No depression, anxiety, mood swings, or difficulty sleeping  HEME/LYMPH: No easy bruising, or bleeding gums  ALLERY AND IMMUNOLOGIC: No hives or eczema	    [x ] All others negative	  [ ] Unable to obtain      ICU Vital Signs Last 24 Hrs  T(C): 36.1 (23 Mar 2018 07:40), Max: 37 (22 Mar 2018 19:07)  T(F): 97 (23 Mar 2018 07:40), Max: 98.6 (22 Mar 2018 19:07)  HR: 110 (23 Mar 2018 07:40) (104 - 114)  BP: 137/88 (23 Mar 2018 07:40) (121/84 - 137/88)  BP(mean): --  ABP: --  ABP(mean): --  RR: 18 (23 Mar 2018 07:40) (18 - 18)  SpO2: 98% (23 Mar 2018 07:40) (98% - 99%)      Appearance: Normal	  HEENT:   Normal oral mucosa, PERRL, EOMI	  Lymphatic: No lymphadenopathy  Cardiovascular: Normal S1 S2   Respiratory: Lungs clear to auscultation	  Psychiatry: A & O x 3, Mood & affect appropriate  Gastrointestinal:  Soft, Non-tender, + BS	  Skin: No rashes, No ecchymoses, No cyanosis	  Neurologic: Non-focal  Extremities:  Right upper extremity bicep area fullness and edema which is improving.  Forearm without edema.  Hands are warm.  R 3rd digit is wrapped.                                        8.1    17.5  )-----------( 478      ( 23 Mar 2018 07:20 )             25.4   03-23    140  |  104  |  59<H>  ----------------------------<  118<H>  4.7   |  23  |  3.03<H>    Ca    9.1      23 Mar 2018 07:20  Mg     2.0     03-23    TPro  6.6  /  Alb  3.3  /  TBili  0.3  /  DBili  x   /  AST  14  /  ALT  21  /  AlkPhos  96  03-23

## 2018-03-23 NOTE — PROGRESS NOTE ADULT - SUBJECTIVE AND OBJECTIVE BOX
Subjective: No current complaints. He feels well without dizziness, abdominal pain, or shortness of breath.     Medications:  acetaminophen   Tablet. 650 milliGRAM(s) Oral every 6 hours PRN  ALBUTerol/ipratropium for Nebulization 3 milliLiter(s) Nebulizer every 6 hours  aspirin enteric coated 81 milliGRAM(s) Oral daily  atovaquone Suspension 1500 milliGRAM(s) Oral daily  Calcium Citrate + Vit D, 315 mg/200 Unit 2 Tablet(s) 2 Tablet(s) Oral two times a day  diltiazem    milliGRAM(s) Oral daily  docusate sodium 100 milliGRAM(s) Oral three times a day  famotidine    Tablet 20 milliGRAM(s) Oral daily  heparin  Injectable 82979 Unit(s) SubCutaneous every 12 hours  insulin lispro (HumaLOG) corrective regimen sliding scale   SubCutaneous three times a day before meals  insulin lispro (HumaLOG) corrective regimen sliding scale   SubCutaneous at bedtime  multivitamin 1 Tablet(s) Oral daily  mycophenolate mofetil 1000 milliGRAM(s) Oral <User Schedule>  nystatin    Suspension 469970 Unit(s) Oral every 6 hours  pravastatin 20 milliGRAM(s) Oral at bedtime  predniSONE   Tablet 50 milliGRAM(s) Oral every 12 hours  silver sulfADIAZINE 1% Cream 1 Application(s) Topical two times a day  sodium bicarbonate 650 milliGRAM(s) Oral three times a day  tacrolimus 8 milliGRAM(s) Oral <User Schedule>  valGANciclovir 450 milliGRAM(s) Oral <User Schedule>    Physical Exam:    Vital Signs Last 24 Hrs  T(C): 36.1 (23 Mar 2018 07:40), Max: 37 (22 Mar 2018 19:07)  T(F): 97 (23 Mar 2018 07:40), Max: 98.6 (22 Mar 2018 19:07)  HR: 110 (23 Mar 2018 07:40) (104 - 114)  BP: 137/88 (23 Mar 2018 07:40) (121/84 - 137/88)  RR: 18 (23 Mar 2018 07:40) (18 - 18)  SpO2: 98% (23 Mar 2018 07:40) (98% - 99%)    Daily Weight in k.3 (23 Mar 2018 09:44)    I&O's Summary    22 Mar 2018 07:01  -  23 Mar 2018 07:00  --------------------------------------------------------  IN: 730 mL / OUT: 1025 mL / NET: -295 mL    23 Mar 2018 07:01  -  23 Mar 2018 09:51  --------------------------------------------------------  IN: 360 mL / OUT: 300 mL / NET: 60 mL      General: No distress. Comfortable.  HEENT: EOM intact.  Neck: Neck supple. JVP not elevated. No masses  Chest: Clear to auscultation bilaterally  CV: RRR with normal S1 and S2. No murmurs, rub, or gallops. Radial pulses normal.  Abdomen: Soft, non-distended, non-tender  Skin: No rashes or skin breakdown  Neurology: Alert and oriented times three. Sensation intact  Psych: Affect normal    Labs:                        8.1    17.5  )-----------( 478      ( 23 Mar 2018 07:20 )             25.4     03-    140  |  104  |  59<H>  ----------------------------<  118<H>  4.7   |  23  |  3.03<H>    Ca    9.1      23 Mar 2018 07:20  Mg     2.0         TPro  6.6  /  Alb  3.3  /  TBili  0.3  /  DBili  x   /  AST  14  /  ALT  21  /  AlkPhos  96  03-23    PT/INR - ( 23 Mar 2018 07:20 )   PT: 12.3 sec;   INR: 1.13 ratio      PTT - ( 23 Mar 2018 07:20 )  PTT:33.0 sec

## 2018-03-23 NOTE — CHART NOTE - NSCHARTNOTEFT_GEN_A_CORE
Patient seen for Transplant follow-up on [facility]    Chart reviewed - Patient admitted with ACC/AHA stage D chronic systolic HF due to NICM S/P heart transplant (2/23); LVAD explant and AICD removal. 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 ?embolic event to right middle finger - ultrasound showed possible RUE hematoma, underwent cardiac catheter and biopsy (3/20). Noted per ID patient with right lower lobe atelectasis vs. early PNA S/P bronchoscopy - improving. Afrebile with leukocytosis on steroids. Bilateral, extensive upper extremity DVT. Hyperkalemia; NORMAN realted, but could be secondary to Tacrolimus started sodium bicarbonate. Improvimg exercise tolerance.    Source: Patient [x]    Family [ ]     other [x] Electronic medical chart review    Diet: Low fiber/residue, no concentrated potassium with Ensure Enlive 3x/daily    Patient reports good appetite and consumed 100% of breakfast; PCA confirmed. Patient denies any GI distress, nausea, vomiting, chewing/swallowing issues, diarrhea or constipation. Last BM was today and it was normal. Follow-up with patient regarding fish preferences - patient was told that porgie and julia fish are not normally prepared in the hospital, but will be ok post-discharge. Patient is attempting to consume Ensure shakes as much as possible, but cannot always finish them.    Current Weight: 3/23 weight is 168.2 pounds, 3/22 165.7 pounds, 2.5 pound weight fluctuation likely due to fluid shifts.    Pertinent Medications: MEDICATIONS  (STANDING):  ALBUTerol/ipratropium for Nebulization 3 milliLiter(s) Nebulizer every 6 hours  aspirin enteric coated 81 milliGRAM(s) Oral daily  atovaquone Suspension 1500 milliGRAM(s) Oral daily  Calcium Citrate + Vit D, 315 mg/200 Unit 2 Tablet(s) 2 Tablet(s) Oral two times a day  diltiazem    milliGRAM(s) Oral daily  docusate sodium 100 milliGRAM(s) Oral three times a day  famotidine    Tablet 20 milliGRAM(s) Oral daily  heparin  Injectable 51344 Unit(s) SubCutaneous every 12 hours  insulin lispro (HumaLOG) corrective regimen sliding scale   SubCutaneous three times a day before meals  insulin lispro (HumaLOG) corrective regimen sliding scale   SubCutaneous at bedtime  multivitamin 1 Tablet(s) Oral daily  mycophenolate mofetil 1000 milliGRAM(s) Oral <User Schedule>  nystatin    Suspension 057754 Unit(s) Oral every 6 hours  pravastatin 20 milliGRAM(s) Oral at bedtime  predniSONE   Tablet 50 milliGRAM(s) Oral every 12 hours  silver sulfADIAZINE 1% Cream 1 Application(s) Topical two times a day  sodium bicarbonate 650 milliGRAM(s) Oral three times a day  tacrolimus 8 milliGRAM(s) Oral <User Schedule>  valGANciclovir 450 milliGRAM(s) Oral <User Schedule>    MEDICATIONS  (PRN):  acetaminophen   Tablet. 650 milliGRAM(s) Oral every 6 hours PRN Mild Pain (1 - 3)    Pertinent Labs:         Skin:     Estimated Needs:   [ ] no change since previous assessment  [ ] recalculated:       Previous Nutrition Diagnosis:     [ ] Inadequate Energy Intake [ ]Inadequate Oral Intake [ ] Excessive Energy Intake     [ ] Underweight [ ] Increased Nutrient Needs [ ] Overweight/Obesity     [ ] Altered GI Function [ ] Unintended Weight Loss [ ] Food & Nutrition Related Knowledge Deficit [ ] Malnutrition          Nutrition Diagnosis is [ ] ongoing  [ ] resolved [ ] not applicable          New Nutrition Diagnosis: [ ] not applicable    [ ] Inadequate Protein Energy Intake [ ]Inadequate Oral Intake [ ] Excessive Energy Intake     [ ] Underweight [ ] Increased Nutrient Needs [ ] Overweight/Obesity     [ ] Altered GI Function [ ] Unintended Weight Loss [ ] Food & Nutrition Related Knowledge Deficit[ ] Limited Adherence to nutrition related recommendations [ ] Malnutrition  [ ] other: Free text       Related to:      As evidenced by:      Interventions:     Recommend    [ ] Change Diet To:    [ ] Nutrition Supplement    [ ] Nutrition Support    [ ] Other:        Monitoring and Evaluation:     [ ] PO intake [ ] Tolerance to diet prescription [ ] weights [ ] follow up per protocol    [ ] other: Patient seen for Transplant follow-up on 2-[facility]    Chart reviewed - Patient admitted with ACC/AHA stage D chronic systolic HF due to NICM S/P heart transplant (2/23); LVAD explant and AICD removal. 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 ?embolic event to right middle finger - ultrasound showed possible RUE hematoma, underwent cardiac catheter and biopsy (3/20). Noted per ID patient with right lower lobe atelectasis vs. early PNA S/P bronchoscopy - improving. Afrebile with leukocytosis on steroids. Bilateral, extensive upper extremity DVT. Hyperkalemia; NORMAN related, but could be secondary to Tacrolimus started sodium bicarbonate. Improving exercise tolerance.    Source: Patient [x]    Family [ ]     other [x] Electronic medical chart review; PCA    Diet: Low fiber/residue, no concentrated potassium with Ensure Enlive 3x/daily    Patient reports good appetite and consumed 100% of breakfast; PCA confirmed. Patient denies any GI distress, nausea, vomiting, chewing/swallowing issues, diarrhea or constipation. Last BM was today and it was normal. Follow-up with patient regarding fish preferences - patient was told that porgie and julia fish are not normally prepared in the hospital, but will be ok post-discharge. Patient is attempting to consume Ensure shakes as much as possible, but cannot always finish them.    Current Weight: 3/23 weight is 168.2 pounds, 3/22 165.7 pounds, 2.5 pound weight fluctuation likely due to fluid shifts.    Pertinent Medications: MEDICATIONS  (STANDING):  ALBUTerol/ipratropium for Nebulization 3 milliLiter(s) Nebulizer every 6 hours  aspirin enteric coated 81 milliGRAM(s) Oral daily  atovaquone Suspension 1500 milliGRAM(s) Oral daily  Calcium Citrate + Vit D, 315 mg/200 Unit 2 Tablet(s) 2 Tablet(s) Oral two times a day  diltiazem    milliGRAM(s) Oral daily  docusate sodium 100 milliGRAM(s) Oral three times a day  famotidine    Tablet 20 milliGRAM(s) Oral daily  heparin  Injectable 91697 Unit(s) SubCutaneous every 12 hours  insulin lispro (HumaLOG) corrective regimen sliding scale   SubCutaneous three times a day before meals  insulin lispro (HumaLOG) corrective regimen sliding scale   SubCutaneous at bedtime  multivitamin 1 Tablet(s) Oral daily  mycophenolate mofetil 1000 milliGRAM(s) Oral <User Schedule>  nystatin    Suspension 329216 Unit(s) Oral every 6 hours  pravastatin 20 milliGRAM(s) Oral at bedtime  predniSONE   Tablet 50 milliGRAM(s) Oral every 12 hours  silver sulfADIAZINE 1% Cream 1 Application(s) Topical two times a day  sodium bicarbonate 650 milliGRAM(s) Oral three times a day  tacrolimus 8 milliGRAM(s) Oral <User Schedule>  valGANciclovir 450 milliGRAM(s) Oral <User Schedule>    MEDICATIONS  (PRN):  acetaminophen   Tablet. 650 milliGRAM(s) Oral every 6 hours PRN Mild Pain (1 - 3)    Pertinent Labs:   Complete Blood Count (03.23.18 @ 07:20)    Hemoglobin: 8.1 g/dL    Hematocrit: 25.4 %    Comprehensive Metabolic Panel (03.23.18 @ 07:20)    Sodium, Serum: 140 mmol/L    Potassium, Serum: 4.7 mmol/L    Chloride, Serum: 104 mmol/L    Carbon Dioxide, Serum: 23 mmol/L    Anion Gap, Serum: 13 mmol/L    Blood Urea Nitrogen, Serum: 59 mg/dL    Creatinine, Serum: 3.03 mg/dL    Glucose, Serum: 118 mg/dL    Calcium, Total Serum: 9.1 mg/dL    Protein Total, Serum: 6.6 g/dL    Albumin, Serum: 3.3 g/dL    Bilirubin Total, Serum: 0.3 mg/dL    Alkaline Phosphatase, Serum: 96 U/L    Aspartate Aminotransferase (AST/SGOT): 14 U/L    Alanine Aminotransferase (ALT/SGPT): 21 U/L RC    Skin:   +1 edema on right arm noted; no pressure injuries noted  wound sites documented    Estimated Needs:   [x] no change since previous assessment    Previous Nutrition Diagnosis:   [x]Inadequate Oral Intake and Increased Nutrient Needs - addressed with po diet and supplementation  [x] Food & Nutrition Related Knowledge Deficit - Being addressed via continued education and follow-up reinforcement during stay    Nutrition Diagnosis is [x] ongoing     New Nutrition Diagnosis: [x] not applicable    Interventions:   Encourage po intake and supplementation with Ensure Enlive 3x/daily  Provide food preferences as appropriate within the dietary regimen  Reinforced avoidance of high potassium foods    Recommend  [x] Continue Low Fiber/residue Diet with no concentrated Potassium  [x] Nutrition Supplement Continue Ensure Enlive 3x/daily: Patient reminded to consume between meals and in the evening post meal times.    [ ] Nutrition Support    [ ] Other:      Monitoring and Evaluation:   [x] PO intake [x] Tolerance to diet prescription [x] weights [x] follow up per protocol  [x] other:  Dietetic intern to remain available for follow up Korey Rivers, dietetic intern, #[phone]. Patient seen for Transplant follow-up on [facility]    Chart reviewed - Patient admitted with ACC/AHA stage D chronic systolic HF due to NICM S/P heart transplant (2/23); LVAD explant and AICD removal. 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 ?embolic event to right middle finger - ultrasound showed possible RUE hematoma, underwent cardiac catheter and biopsy (3/20). Noted per ID patient with right lower lobe atelectasis vs. early PNA S/P bronchoscopy - improving. Afrebile with leukocytosis on steroids. Bilateral, extensive upper extremity DVT. Hyperkalemia; NORMAN related, but could be secondary to Tacrolimus started sodium bicarbonate. Improving exercise tolerance.    Source: Patient [x]    Family [ ]     other [x] Electronic medical chart review; PCA    Diet: Low fiber/residue, no concentrated potassium with Ensure Enlive 3x/daily    Patient reports good appetite and consumed 100% of breakfast; PCA confirmed. Patient denies any GI distress, nausea, vomiting, chewing/swallowing issues, diarrhea or constipation. Last BM was today and it was normal. Follow-up with patient regarding fish preferences - patient was told that porgie and julia fish are not normally prepared in the hospital, but will be ok post-discharge. Patient is attempting to consume Ensure shakes as much as possible, but cannot always finish them.    Current Weight: 3/23 weight is 168.2 pounds, 3/22 165.7 pounds, 2.5 pound weight fluctuation likely due to fluid shifts.    Pertinent Medications: MEDICATIONS  (STANDING):  ALBUTerol/ipratropium for Nebulization 3 milliLiter(s) Nebulizer every 6 hours  aspirin enteric coated 81 milliGRAM(s) Oral daily  atovaquone Suspension 1500 milliGRAM(s) Oral daily  Calcium Citrate + Vit D, 315 mg/200 Unit 2 Tablet(s) 2 Tablet(s) Oral two times a day  diltiazem    milliGRAM(s) Oral daily  docusate sodium 100 milliGRAM(s) Oral three times a day  famotidine    Tablet 20 milliGRAM(s) Oral daily  heparin  Injectable 20652 Unit(s) SubCutaneous every 12 hours  insulin lispro (HumaLOG) corrective regimen sliding scale   SubCutaneous three times a day before meals  insulin lispro (HumaLOG) corrective regimen sliding scale   SubCutaneous at bedtime  multivitamin 1 Tablet(s) Oral daily  mycophenolate mofetil 1000 milliGRAM(s) Oral <User Schedule>  nystatin    Suspension 753008 Unit(s) Oral every 6 hours  pravastatin 20 milliGRAM(s) Oral at bedtime  predniSONE   Tablet 50 milliGRAM(s) Oral every 12 hours  silver sulfADIAZINE 1% Cream 1 Application(s) Topical two times a day  sodium bicarbonate 650 milliGRAM(s) Oral three times a day  tacrolimus 8 milliGRAM(s) Oral <User Schedule>  valGANciclovir 450 milliGRAM(s) Oral <User Schedule>    MEDICATIONS  (PRN):  acetaminophen   Tablet. 650 milliGRAM(s) Oral every 6 hours PRN Mild Pain (1 - 3)    Pertinent Labs:   Complete Blood Count (03.23.18 @ 07:20)    Hemoglobin: 8.1 g/dL <L>    Hematocrit: 25.4 % <L>    Comprehensive Metabolic Panel (03.23.18 @ 07:20)    Sodium, Serum: 140 mmol/L    Potassium, Serum: 4.7 mmol/L    Chloride, Serum: 104 mmol/L    Carbon Dioxide, Serum: 23 mmol/L    Anion Gap, Serum: 13 mmol/L    Blood Urea Nitrogen, Serum: 59 mg/dL <H>    Creatinine, Serum: 3.03 mg/dL <H>    Glucose, Serum: 118 mg/dL <H>    Calcium, Total Serum: 9.1 mg/dL    Protein Total, Serum: 6.6 g/dL    Albumin, Serum: 3.3 g/dL    Bilirubin Total, Serum: 0.3 mg/dL    Alkaline Phosphatase, Serum: 96 U/L    Aspartate Aminotransferase (AST/SGOT): 14 U/L    Alanine Aminotransferase (ALT/SGPT): 21 U/L     Point of care testing: (3/22) 132-313 mg/dL, (3/23) 123-174 mg/dL    Skin:   +1 edema on right arm noted; no pressure injuries noted  wound sites documented    Estimated Needs:   [x] no change since previous assessment    Previous Nutrition Diagnosis:   [x]Inadequate Oral Intake and Increased Nutrient Needs - addressed with po diet and supplementation  [x] Food & Nutrition Related Knowledge Deficit - Being addressed via continued education and follow-up reinforcement during stay    Nutrition Diagnosis is [x] ongoing     New Nutrition Diagnosis: [x] not applicable    Interventions:   Encourage po intake and supplementation with Ensure Enlive 3x/daily  Provide food preferences as appropriate within the dietary regimen  Reinforced avoidance of high potassium foods    Recommend  [x] Continue Low Fiber/residue Diet with no concentrated Potassium  [x] Nutrition Supplement Continue Ensure Enlive 3x/daily: Patient reminded to consume between meals and in the evening post meal times.    [ ] Nutrition Support    [ ] Other:      Monitoring and Evaluation:   [x] PO intake [x] Tolerance to diet prescription [x] weights [x] follow up per protocol  [x] other:  Dietetic intern to remain available for follow up Korey Rivers, dietetic intern, #[phone].

## 2018-03-23 NOTE — PROGRESS NOTE ADULT - ASSESSMENT
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 Found to have b/l IJ/subclavian thrombi as well.          	   Antimicrobial prophylaxis:  Intermediate risk CMV - Adjust Valcyte for NORMAN to 450 mg daily just TWICE weekly. Last dose on Saturday so will schedule for Tues/Sat.  Intermediate risk Toxo - continue Mepron 1500mg PO daily with food.  PCP - continue Mepron 1500mg PO daily  Thrush - continue Nystatin S/S 5 mL Qid  HCV+ donor/HCV- recipient- follow weekly HCV PCR  check HCV genotype in recipient  Patient will need HCV treatment as an out patient      Gary Lujan MD  [phone]  After 5pm/weekends [phone]

## 2018-03-23 NOTE — PROGRESS NOTE ADULT - ASSESSMENT
Mr. Baez is a 67 year old man with ACC/AHA stage D chronic systolic heart failure due to NICM s/p HM2 LVAD 6/17 c/b pump thrombosis now s/p heart transplant on 2/23 (CMV D-/R+ and Toxo D-/R+), with post-op course c/b primary graft dysfunction, initially thought to be immune-mediated due to positive B-cell flow (negative CDC cross match) and received plasmapharesis/IVIg x2 with improvement in LV function. His course has been complicated by bilateral IJ/subclavian thrombi. He currently has acute renal failure of unclear etiology. However, he does not have clear evidence of allograft dysfunction or low output on exam. 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     Plan discussed with 2 [facility] NP team and in multidisciplinary rounds.     #Heart Transplant  EMB #1: 1R/1A, AMR 1. DSA negative.  EMB #2: 1R/1A, AMR 1. DSA negative.  EMB #3: 1R/1A. stable AMR1. No DSA. RA 12, PA 40/16/27, PCW 11, PA 67%, CO/CI 6.8/3.6. TTE with mild RV enlargement and dysfunction.  EMB #4: 1R/2A with RA 10, PA 39/18, Wedge 18, CO/CI 6/3.2. No effusion on TTE.     # Immunosuppression:  Tacrolimus -  Tacro level 9.7 this morning from 9.4. Target trough level of 12-14. Will increase to 9 mg PO BID. Will increase diltiazem to 180 mg daily to increase tacro levels.   CellCept - continue 1000 mg PO BID.   Steroids - will give short pulse of oral steroids. Will give 50 mg PO BID for three days with subsequent taper.     #Hypertension  - Increase diltiazem to 180 mg daily as above.   	  # Antimicrobial prophylaxis:  Intermediate risk CMV - Cotninue valgancyclovir 450 mg daily TWICE weekly.   Intermediate risk Toxo - continue Mepron 1500mg PO daily with food.  PCP - continue Mepron 1500mg PO daily  Thrush - continue Nystatin S/S 5 mL QID    # Bilateral IJ/Subclavian thrombi - unchanged on f/u U/S 3/12  - lovenox switched to heparin 85470 units subcutaneous Q12 (250 units/kg Q12) per vasc cards recs in setting of renal function. Please hold 3/19 PM and 3/20 AM dose for RHC.   - CHERIE negative  - Will consider transition to apixaban.     #Acute renal failure, possibly related to vancomycin dosing, improving, albeit slowly.   - Vanco stopped.   - He responded to diuretics given on Friday. No further diuresis.     # CAV PPx  - Continue ECASA 81mg PO daily  - Continue pravastatin 20mg PO QHS    # ID   - Pseudomonas in sputum - course of cefepime completed 3/15  - DLES improved. Off of antibiotics and will continue to monitor.   - silver sulfadizaine dressings per plastics for R 3rd digit.   - HCV viral load and PCR per protocol. Therapy to be initiated as outpatient by Dr. Thomas (Hepatology)  - Transplant ID following    #endo  - BG currently well controlled, ranging 103-157  - SSI AC & HS    # Additional prevention:  - Citracal + D 2 tabs PO BID  - multivitamin 1 tab daily  - Stress ulcer ppx while on steroids: will change pantoprazole to ranitidine per renal recommendations.  - Mag oxide discontinued currently. Mag 2.0 this AM.    #dispo  - Ongoing work with PT for ongoing exercise tolerance prior to d/c

## 2018-03-23 NOTE — PROGRESS NOTE ADULT - ASSESSMENT
Assessment:  1.  Bilateral, extensive upper extremity DVT  - Currently on lovenox  - Swelling of RUE controlled with elevation and ace wrap  2.  Heart Transplant  3.  Acute renal failure  4.  Right 3rf digit ischemia - stable  5.  Right foot bunion      Plan  1  Continue with heparin SQ at current dose.  Seems that the PTT is low because it was checked much later than his heparin dose.    would check PTT 6 hours after receiving heparin dose   2.  R arm is clinically improving , and hematoma is stable/improved on CT scan - would continue with compression and observation for now.   The arm should always be wrapped with ACE and elevated for now  3.  Ok to hold heparin prior to cardiac biopsies         [place] [id number]

## 2018-03-23 NOTE — PROGRESS NOTE ADULT - PROBLEM SELECTOR PLAN 1
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  Tacro increased 3/21 and will monitor crt given recent increase due to worsening cardiac rejection. It might take some time for ATN to recover given tacro causing some vasoconstriction in an ischemic ATN kidney. Will recheck urinalysis sediment today to see if any other concerns. No major indications for a kidney biopsy at this point.    Hyperkalemia has resolved. Cont bicarbonate Tacro levels are fluctuating and will monitor for now and if possible to keep 10-12 range Vanco induced cast nephropathy if levels are high in such cases.  Tacro increased 3/21 and will monitor crt given recent increase due to worsening cardiac rejection. It might take some time for ATN to recover given tacro causing some vasoconstriction in an ischemic ATN kidney. Will recheck urinalysis sediment today to see if any other concerns. No major indications for a kidney biopsy at this point. Urine sediment is bland on my review- no granular casts noted today    Hyperkalemia has resolved. Cont bicarbonate

## 2018-03-23 NOTE — PROGRESS NOTE ADULT - SUBJECTIVE AND OBJECTIVE BOX
VITAL SIGNS-Tele: [id number]/-[id number]    Vital Signs Last 24 Hrs  T(C): 36.1 (18 @ 07:40), Max: 37 (18 @ 19:07)  HR: 110 (18 @ 07:40) (104 - 114)  BP: 137/88 (18 @ 07:40) (121/84 - 137/88)  SpO2: 98% (18 @ 07:40) (98% - 99%)          07:01  -   @ 07:00  --------------------------------------------------------  IN: 730 mL / OUT: 1025 mL / NET: -295 mL     @ 07:01  -   @ 10:28  --------------------------------------------------------  IN: 360 mL / OUT: 300 mL / NET: 60 mL    Daily     Daily Weight in k.3 (23 Mar 2018 09:44)    CAPILLARY BLOOD GLUCOSE  POCT Blood Glucose.: 123 mg/dL (23 Mar 2018 07:37)  POCT Blood Glucose.: 206 mg/dL (22 Mar 2018 22:14)  POCT Blood Glucose.: 144 mg/dL (22 Mar 2018 18:27)  POCT Blood Glucose.: 313 mg/dL (22 Mar 2018 11:33)       Drains:     Vac To R mid abd.  PHYSICAL EXAM:  Neurology: alert and oriented x 3, nonfocal, no gross deficits  CV : S1S2  Sternal Wound :  CDI , Stable  Lungs: CTA - slightly diminished RLL  Abdomen: soft, nontender, nondistended, positive bowel sounds, last bowel movement       3/23  Extremities:     no edema no calf tenderness    acetaminophen   Tablet. 650 milliGRAM(s) Oral every 6 hours PRN  ALBUTerol/ipratropium for Nebulization 3 milliLiter(s) Nebulizer every 6 hours  aspirin enteric coated 81 milliGRAM(s) Oral daily  atovaquone Suspension 1500 milliGRAM(s) Oral daily  Calcium Citrate + Vit D, 315 mg/200 Unit 2 Tablet(s) 2 Tablet(s) Oral two times a day  diltiazem    milliGRAM(s) Oral daily  docusate sodium 100 milliGRAM(s) Oral three times a day  famotidine    Tablet 20 milliGRAM(s) Oral daily  heparin  Injectable 07939 Unit(s) SubCutaneous every 12 hours  multivitamin 1 Tablet(s) Oral daily  mycophenolate mofetil 1000 milliGRAM(s) Oral <User Schedule>  nystatin    Suspension 207712 Unit(s) Oral every 6 hours  pravastatin 20 milliGRAM(s) Oral at bedtime  predniSONE   Tablet 50 milliGRAM(s) Oral every 12 hours  silver sulfADIAZINE 1% Cream 1 Application(s) Topical two times a day  sodium bicarbonate 650 milliGRAM(s) Oral three times a day  tacrolimus 8 milliGRAM(s) Oral <User Schedule>  valGANciclovir 450 milliGRAM(s) Oral <User Schedule>    Physical Therapy Rec:   Home  [  ]   Home w/ PT  [ x ]  Rehab  [  ]  Discussed with Cardiothoracic Team at AM rounds.

## 2018-03-23 NOTE — PROGRESS NOTE ADULT - SUBJECTIVE AND OBJECTIVE BOX
INFECTIOUS DISEASES FOLLOW UP-- Sujey Lujan  [phone]    This is a follow up note for this  67yMale with s/p orthotopic heart transplant on , with course complicated by acute rejection  Improving  and no events over the past 24 hours Ambulating with PT in the hallway  Heart replaced by heart assist device      ROS:  CONSTITUTIONAL:  No fever, good appetite  CARDIOVASCULAR:  No chest pain or palpitations  RESPIRATORY:  No dyspnea  GASTROINTESTINAL:  No nausea, vomiting, diarrhea, or abdominal pain  GENITOURINARY:  No dysuria  NEUROLOGIC:  No headache,     Allergies    No Known Allergies    Intolerances        ANTIBIOTICS/RELEVANT:  antimicrobials  atovaquone Suspension 1500 milliGRAM(s) Oral daily  nystatin    Suspension 414584 Unit(s) Oral every 6 hours  valGANciclovir 450 milliGRAM(s) Oral <User Schedule>    immunologic:  mycophenolate mofetil 1000 milliGRAM(s) Oral <User Schedule>  tacrolimus 9 milliGRAM(s) Oral <User Schedule>    OTHER:  acetaminophen   Tablet. 650 milliGRAM(s) Oral every 6 hours PRN  ALBUTerol/ipratropium for Nebulization 3 milliLiter(s) Nebulizer every 6 hours  aspirin enteric coated 81 milliGRAM(s) Oral daily  Calcium Citrate + Vit D, 315 mg/200 Unit 2 Tablet(s) 2 Tablet(s) Oral two times a day  diltiazem    milliGRAM(s) Oral daily  docusate sodium 100 milliGRAM(s) Oral three times a day  famotidine    Tablet 20 milliGRAM(s) Oral daily  heparin  Injectable 88310 Unit(s) SubCutaneous every 12 hours  insulin lispro (HumaLOG) corrective regimen sliding scale   SubCutaneous three times a day before meals  insulin lispro (HumaLOG) corrective regimen sliding scale   SubCutaneous at bedtime  multivitamin 1 Tablet(s) Oral daily  pravastatin 20 milliGRAM(s) Oral at bedtime  predniSONE   Tablet 50 milliGRAM(s) Oral every 12 hours  silver sulfADIAZINE 1% Cream 1 Application(s) Topical two times a day  sodium bicarbonate 650 milliGRAM(s) Oral three times a day      Objective:  Vital Signs Last 24 Hrs  T(C): 36.4 (23 Mar 2018 16:06), Max: 37 (22 Mar 2018 19:07)  T(F): 97.6 (23 Mar 2018 16:06), Max: 98.6 (22 Mar 2018 19:07)  HR: 115 (23 Mar 2018 16:06) (104 - 115)  BP: 136/91 (23 Mar 2018 16:06) (121/84 - 137/88)  BP(mean): --  RR: 18 (23 Mar 2018 16:06) (18 - 18)  SpO2: 98% (23 Mar 2018 16:06) (98% - 99%)    PHYSICAL EXAM:  Constitutional:no acute distress  Eyes:WALT, EOMI  Ear/Nose/Throat: no oral lesions, 	  Respiratory: clear BL decreased at right base  old LVAD site with small wound vac in place  Cardiovascular: S1S2  Gastrointestinal:soft, (+) BS, no tenderness  Extremities:no e/e/c, decreased swelling in RUE, right middle finger remains with blackened discoloration of nail  No Lymphadenopathy  IV sites not inflammed.    LABS:                        8.1    17.5  )-----------( 478      ( 23 Mar 2018 07:20 )             25.4     03-23    140  |  104  |  59<H>  ----------------------------<  118<H>  4.7   |  23  |  3.03<H>    Ca    9.1      23 Mar 2018 07:20  Mg     2.0     -    TPro  6.6  /  Alb  3.3  /  TBili  0.3  /  DBili  x   /  AST  14  /  ALT  21  /  AlkPhos  96  03-23    PT/INR - ( 23 Mar 2018 07:20 )   PT: 12.3 sec;   INR: 1.13 ratio         PTT - ( 23 Mar 2018 07:20 )  PTT:33.0 sec  Urinalysis Basic - ( 23 Mar 2018 16:03 )    Color: Yellow / Appearance: Slightly Turbid / S.020 / pH: x  Gluc: x / Ketone: Negative  / Bili: Negative / Urobili: Negative mg/dL   Blood: x / Protein: 30 mg/dL / Nitrite: Negative   Leuk Esterase: Small / RBC: x / WBC x   Sq Epi: x / Non Sq Epi: x / Bacteria: x        MICROBIOLOGY:    Urinalysis + Microscopic Examination (18 @ 16:03)    Blood, Urine: Small    Bilirubin: Negative    Color: Yellow    Protein, Urine: 30 mg/dL    Leukocyte Esterase Concentration: Small    Nitrite: Negative    pH Urine: 5.0    Ketone - Urine: Negative    Glucose Qualitative, Urine: Negative mg/dL    Urobilinogen: Negative mg/dL    Urine Appearance: Slightly Turbid    Specific Gravity: 1.020                RECENT CULTURES:      RADIOLOGY & ADDITIONAL STUDIES:    < from: Xray Chest 1 View- PORTABLE-Routine (18 @ 02:51) >    IMPRESSION:    1.  Small right pleural effusion.   2.  Right lower lung atelectasis.    < end of copied text >

## 2018-03-23 NOTE — PROGRESS NOTE ADULT - ASSESSMENT
Mr. Baez is a 67 year old man with ACC/AHA stage D chronic systolic heart failure due to NICM s/p HM2 LVAD 6/17 c/b pump thrombosis now s/p heart transplant on 2/23 (CMV D-/R+ and Toxo D-/R+), with post-op course c/b primary graft dysfunction, initially thought to be immune-mediated due to positive B-cell flow (negative CDC cross match) and received plasmapharesis/IVIg x2 with improvement in LV function. His course has been complicated by bilateral IJ/subclavian thrombi. He currently has acute renal failure of unclear etiology. However, he does not have clear evidence of allograft dysfunction or low output on exam. 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     # Immunosuppression:  Tacrolimus -  Will continue dosing at 8 mg BID (increased yesterday). Tacro level 9.4 this morning from 10.9. Target trough level of 12-14.   CellCept - continue 1000 mg PO BID.   Steroids - will give short pulse of oral steroids. Will give 50 mg PO BID for three days with subsequent taper.     #Hypertension  - Continue cardizem 120 mg daily.   	  # Antimicrobial prophylaxis:  Intermediate risk CMV - Valcyte for NORMAN to 450 mg daily just TWICE weekly.   Intermediate risk Toxo - continue Mepron 1500mg PO daily with food.  PCP - continue Mepron 1500mg PO daily  Thrush - continue Nystatin S/S 5 mL QID    # Bilateral IJ/Subclavian thrombi - unchanged on f/u U/S 3/12  - lovenox switched to heparin 43546 units subcutaneous Q12 (250 units/kg Q12) per vasc cards recs in setting of renal function. Please hold 3/19 PM and 3/20 AM dose for RHC.   - CHERIE negative    #Acute renal failure, possibly related to vancomycin dosing, improving.   - Vanco stopped.   - He responded to diuretics given on Friday. No further diuresis.     # CAV PPx  - Continue ECASA 81mg PO daily  - Continue pravastatin 20mg PO QHS    # ID   - Pseudomonas in sputum - course of cefepime completed 3/15  - DLES improved and vancomycin stopped given high trough with NORMAN. Per ID, will redose with daptomycin depending upon repeat vanc level this morning.   - silver sulfadizaine dressings per plastics for R 3rd digit.   - HCV viral load and PCR per protocol. Therapy to be initiated as outpatient by Dr. Thomas (Hepatology)  - Transplant ID following    #endo  - BG currently well controlled, ranging 103-157  - SSI AC & HS    # Additional prevention:  - Citracal + D 2 tabs PO BID  - multivitamin 1 tab daily  - Stress ulcer ppx while on steroids: will change pantoprazole to ranitidine per renal recommendations.  - Mag oxide discontinued currently. Mag 2.3 this AM.    #dispo  - Ongoing work with PT for ongoing exercise tolerance prior to d/c    plan d/c home early next week.

## 2018-03-24 DIAGNOSIS — D89.9 DISORDER INVOLVING THE IMMUNE MECHANISM, UNSPECIFIED: ICD-10-CM

## 2018-03-24 DIAGNOSIS — I82.623 ACUTE EMBOLISM AND THROMBOSIS OF DEEP VEINS OF UPPER EXTREMITY, BILATERAL: ICD-10-CM

## 2018-03-24 DIAGNOSIS — J90 PLEURAL EFFUSION, NOT ELSEWHERE CLASSIFIED: ICD-10-CM

## 2018-03-24 DIAGNOSIS — Z79.2 LONG TERM (CURRENT) USE OF ANTIBIOTICS: ICD-10-CM

## 2018-03-24 DIAGNOSIS — T86.21 HEART TRANSPLANT REJECTION: ICD-10-CM

## 2018-03-24 LAB
ANION GAP SERPL CALC-SCNC: 18 MMOL/L — HIGH (ref 5–17)
APTT BLD: 30 SEC — SIGNIFICANT CHANGE UP (ref 27.5–37.4)
APTT BLD: 42.9 SEC — HIGH (ref 27.5–37.4)
BACTERIA # UR AUTO: ABNORMAL
BUN SERPL-MCNC: 57 MG/DL — HIGH (ref 7–23)
CALCIUM SERPL-MCNC: 8.8 MG/DL — SIGNIFICANT CHANGE UP (ref 8.4–10.5)
CHLORIDE SERPL-SCNC: 101 MMOL/L — SIGNIFICANT CHANGE UP (ref 96–108)
CO2 SERPL-SCNC: 20 MMOL/L — LOW (ref 22–31)
COMMENT - URINE: SIGNIFICANT CHANGE UP
CREAT SERPL-MCNC: 2.39 MG/DL — HIGH (ref 0.5–1.3)
EPI CELLS # UR: 4 /HPF — SIGNIFICANT CHANGE UP (ref 0–5)
GLUCOSE BLDC GLUCOMTR-MCNC: 142 MG/DL — HIGH (ref 70–99)
GLUCOSE BLDC GLUCOMTR-MCNC: 154 MG/DL — HIGH (ref 70–99)
GLUCOSE BLDC GLUCOMTR-MCNC: 168 MG/DL — HIGH (ref 70–99)
GLUCOSE BLDC GLUCOMTR-MCNC: 194 MG/DL — HIGH (ref 70–99)
GLUCOSE SERPL-MCNC: 140 MG/DL — HIGH (ref 70–99)
HCT VFR BLD CALC: 28.3 % — LOW (ref 39–50)
HGB BLD-MCNC: 9 G/DL — LOW (ref 13–17)
HYALINE CASTS # UR AUTO: 0 /LPF — SIGNIFICANT CHANGE UP (ref 0–7)
INR BLD: 1.16 RATIO — SIGNIFICANT CHANGE UP (ref 0.88–1.16)
MAGNESIUM SERPL-MCNC: 1.9 MG/DL — SIGNIFICANT CHANGE UP (ref 1.6–2.6)
MCHC RBC-ENTMCNC: 29.5 PG — SIGNIFICANT CHANGE UP (ref 27–34)
MCHC RBC-ENTMCNC: 32 GM/DL — SIGNIFICANT CHANGE UP (ref 32–36)
MCV RBC AUTO: 92.2 FL — SIGNIFICANT CHANGE UP (ref 80–100)
PLATELET # BLD AUTO: 504 K/UL — HIGH (ref 150–400)
POTASSIUM SERPL-MCNC: 4.6 MMOL/L — SIGNIFICANT CHANGE UP (ref 3.5–5.3)
POTASSIUM SERPL-SCNC: 4.6 MMOL/L — SIGNIFICANT CHANGE UP (ref 3.5–5.3)
PROTHROM AB SERPL-ACNC: 12.7 SEC — SIGNIFICANT CHANGE UP (ref 9.8–12.7)
RBC # BLD: 3.06 M/UL — LOW (ref 4.2–5.8)
RBC # FLD: 17.2 % — HIGH (ref 10.3–14.5)
RBC CASTS # UR COMP ASSIST: 5 /HPF — HIGH (ref 0–4)
SODIUM SERPL-SCNC: 139 MMOL/L — SIGNIFICANT CHANGE UP (ref 135–145)
TACROLIMUS SERPL-MCNC: 7.8 NG/ML — SIGNIFICANT CHANGE UP
WBC # BLD: 19.1 K/UL — HIGH (ref 3.8–10.5)
WBC # FLD AUTO: 19.1 K/UL — HIGH (ref 3.8–10.5)
WBC UR QL: 8 /HPF — HIGH (ref 0–5)

## 2018-03-24 PROCEDURE — 99233 SBSQ HOSP IP/OBS HIGH 50: CPT

## 2018-03-24 PROCEDURE — 71045 X-RAY EXAM CHEST 1 VIEW: CPT | Mod: 26

## 2018-03-24 RX ORDER — DILTIAZEM HCL 120 MG
240 CAPSULE, EXT RELEASE 24 HR ORAL DAILY
Qty: 0 | Refills: 0 | Status: DISCONTINUED | OUTPATIENT
Start: 2018-03-24 | End: 2018-03-29

## 2018-03-24 RX ORDER — HEPARIN SODIUM 5000 [USP'U]/ML
1000 INJECTION INTRAVENOUS; SUBCUTANEOUS ONCE
Qty: 0 | Refills: 0 | Status: COMPLETED | OUTPATIENT
Start: 2018-03-24 | End: 2018-03-24

## 2018-03-24 RX ORDER — HEPARIN SODIUM 5000 [USP'U]/ML
13000 INJECTION INTRAVENOUS; SUBCUTANEOUS EVERY 12 HOURS
Qty: 0 | Refills: 0 | Status: DISCONTINUED | OUTPATIENT
Start: 2018-03-24 | End: 2018-03-26

## 2018-03-24 RX ADMIN — Medication 1 APPLICATION(S): at 06:06

## 2018-03-24 RX ADMIN — Medication 500000 UNIT(S): at 17:35

## 2018-03-24 RX ADMIN — Medication 50 MILLIGRAM(S): at 06:05

## 2018-03-24 RX ADMIN — Medication 500000 UNIT(S): at 00:04

## 2018-03-24 RX ADMIN — Medication 500000 UNIT(S): at 12:31

## 2018-03-24 RX ADMIN — Medication 100 MILLIGRAM(S): at 22:04

## 2018-03-24 RX ADMIN — Medication 1 TABLET(S): at 12:30

## 2018-03-24 RX ADMIN — HEPARIN SODIUM 13000 UNIT(S): 5000 INJECTION INTRAVENOUS; SUBCUTANEOUS at 23:48

## 2018-03-24 RX ADMIN — FAMOTIDINE 20 MILLIGRAM(S): 10 INJECTION INTRAVENOUS at 12:56

## 2018-03-24 RX ADMIN — Medication 1 APPLICATION(S): at 17:34

## 2018-03-24 RX ADMIN — Medication 20 MILLIGRAM(S): at 22:05

## 2018-03-24 RX ADMIN — HEPARIN SODIUM 11800 UNIT(S): 5000 INJECTION INTRAVENOUS; SUBCUTANEOUS at 00:07

## 2018-03-24 RX ADMIN — ATOVAQUONE 1500 MILLIGRAM(S): 750 SUSPENSION ORAL at 12:31

## 2018-03-24 RX ADMIN — Medication 3 MILLILITER(S): at 23:48

## 2018-03-24 RX ADMIN — Medication 3 MILLILITER(S): at 06:14

## 2018-03-24 RX ADMIN — Medication 500000 UNIT(S): at 06:05

## 2018-03-24 RX ADMIN — Medication 180 MILLIGRAM(S): at 06:14

## 2018-03-24 RX ADMIN — Medication 100 MILLIGRAM(S): at 06:05

## 2018-03-24 RX ADMIN — MYCOPHENOLATE MOFETIL 1000 MILLIGRAM(S): 250 CAPSULE ORAL at 20:00

## 2018-03-24 RX ADMIN — HEPARIN SODIUM 1000 UNIT(S): 5000 INJECTION INTRAVENOUS; SUBCUTANEOUS at 17:28

## 2018-03-24 RX ADMIN — HEPARIN SODIUM 11800 UNIT(S): 5000 INJECTION INTRAVENOUS; SUBCUTANEOUS at 12:57

## 2018-03-24 RX ADMIN — MYCOPHENOLATE MOFETIL 1000 MILLIGRAM(S): 250 CAPSULE ORAL at 08:12

## 2018-03-24 RX ADMIN — Medication 45 MILLIGRAM(S): at 18:01

## 2018-03-24 RX ADMIN — Medication 81 MILLIGRAM(S): at 12:30

## 2018-03-24 RX ADMIN — Medication 650 MILLIGRAM(S): at 13:03

## 2018-03-24 RX ADMIN — Medication 2: at 12:30

## 2018-03-24 RX ADMIN — Medication 3 MILLILITER(S): at 00:03

## 2018-03-24 RX ADMIN — Medication 650 MILLIGRAM(S): at 06:05

## 2018-03-24 RX ADMIN — Medication 500000 UNIT(S): at 23:47

## 2018-03-24 RX ADMIN — VALGANCICLOVIR 450 MILLIGRAM(S): 450 TABLET, FILM COATED ORAL at 06:05

## 2018-03-24 RX ADMIN — Medication 3 MILLILITER(S): at 17:29

## 2018-03-24 RX ADMIN — Medication 2: at 08:10

## 2018-03-24 RX ADMIN — Medication 650 MILLIGRAM(S): at 22:05

## 2018-03-24 RX ADMIN — TACROLIMUS 9 MILLIGRAM(S): 5 CAPSULE ORAL at 08:15

## 2018-03-24 RX ADMIN — Medication 3 MILLILITER(S): at 12:31

## 2018-03-24 RX ADMIN — TACROLIMUS 9 MILLIGRAM(S): 5 CAPSULE ORAL at 20:01

## 2018-03-24 RX ADMIN — Medication 100 MILLIGRAM(S): at 13:03

## 2018-03-24 NOTE — PROGRESS NOTE ADULT - SUBJECTIVE AND OBJECTIVE BOX
VASCULAR MEDICINE                         CC:  UEDVT    INTERVAL HISTORY:     No CP or SOB. R arm without symptoms.  Creat improving.  PTT slightly lower than goal - now ordered for higher dose of heparin.     Allergies    No Known Allergies        MEDICATIONS  (STANDING):  ALBUTerol/ipratropium for Nebulization 3 milliLiter(s) Nebulizer every 6 hours  aspirin enteric coated 81 milliGRAM(s) Oral daily  atovaquone Suspension 1500 milliGRAM(s) Oral daily  Calcium Citrate + Vit D, 315 mg/200 Unit 2 Tablet(s) 2 Tablet(s) Oral two times a day  diltiazem    milliGRAM(s) Oral daily  docusate sodium 100 milliGRAM(s) Oral three times a day  famotidine    Tablet 20 milliGRAM(s) Oral daily  heparin  Injectable 59829 Unit(s) SubCutaneous every 12 hours  insulin lispro (HumaLOG) corrective regimen sliding scale   SubCutaneous three times a day before meals  insulin lispro (HumaLOG) corrective regimen sliding scale   SubCutaneous at bedtime  multivitamin 1 Tablet(s) Oral daily  mycophenolate mofetil 1000 milliGRAM(s) Oral <User Schedule>  nystatin    Suspension 080277 Unit(s) Oral every 6 hours  pravastatin 20 milliGRAM(s) Oral at bedtime  predniSONE   Tablet 45 milliGRAM(s) Oral every 12 hours  silver sulfADIAZINE 1% Cream 1 Application(s) Topical two times a day  sodium bicarbonate 650 milliGRAM(s) Oral three times a day  tacrolimus 9 milliGRAM(s) Oral <User Schedule>  valGANciclovir 450 milliGRAM(s) Oral <User Schedule>    MEDICATIONS  (PRN):  acetaminophen   Tablet. 650 milliGRAM(s) Oral every 6 hours PRN Mild Pain (1 - 3)    SOCIAL HISTORY:  unchanged    REVIEW OF SYSTEMS:  CONSTITUTIONAL: No fever, weight loss, or fatigue  EYES: No eye pain, visual disturbances, or discharge  ENMT:  No difficulty hearing, tinnitus, vertigo; No sinus or throat pain  NECK: No pain or stiffness  RESPIRATORY: No cough, wheezing, chills or hemoptysis; No Shortness of Breath  CARDIOVASCULAR: No chest pain, palpitations, passing out, dizziness, or leg swelling  GASTROINTESTINAL: No abdominal or epigastric pain. No nausea, vomiting, or hematemesis; No diarrhea or constipation. No melena or hematochezia.  GENITOURINARY: No dysuria, frequency, hematuria, or incontinence  NEUROLOGICAL: No headaches, memory loss, loss of strength, numbness, or tremors  SKIN: No itching, burning, rashes, or lesions   LYMPH Nodes: No enlarged glands  ENDOCRINE: No heat or cold intolerance; No hair loss  MUSCULOSKELETAL: No joint pain or swelling; No muscle, back, or extremity pain  PSYCHIATRIC: No depression, anxiety, mood swings, or difficulty sleeping  HEME/LYMPH: No easy bruising, or bleeding gums  ALLERY AND IMMUNOLOGIC: No hives or eczema	    [x ] All others negative	  [ ] Unable to obtain    ICU Vital Signs Last 24 Hrs  T(C): 36.2 (24 Mar 2018 19:58), Max: 36.7 (24 Mar 2018 12:30)  T(F): 97.2 (24 Mar 2018 19:58), Max: 98 (24 Mar 2018 12:30)  HR: 106 (24 Mar 2018 19:58) (101 - 109)  BP: 121/77 (24 Mar 2018 19:58) (115/74 - 141/92)  BP(mean): 94 (24 Mar 2018 12:30) (94 - 110)  ABP: --  ABP(mean): --  RR: 18 (24 Mar 2018 19:58) (18 - 20)  SpO2: 100% (24 Mar 2018 19:58) (99% - 100%)  Appearance: Normal	  HEENT:   Normal oral mucosa, PERRL, EOMI	  Lymphatic: No lymphadenopathy  Cardiovascular: Normal S1 S2   Respiratory: Lungs clear to auscultation	  Psychiatry: A & O x 3, Mood & affect appropriate  Gastrointestinal:  Soft, Non-tender, + BS	  Skin: No rashes, No ecchymoses, No cyanosis	  Neurologic: Non-focal  Extremities:  Right upper extremity bicep area fullness and edema which is improving.  Forearm without edema.  Hands are warm.  R 3rd digit is wrapped.                              9.0    19.1  )-----------( 504      ( 24 Mar 2018 07:12 )             28.3   03-24    139  |  101  |  57<H>  ----------------------------<  140<H>  4.6   |  20<L>  |  2.39<H>    Ca    8.8      24 Mar 2018 07:12  Mg     1.9     03-24    TPro  6.6  /  Alb  3.3  /  TBili  0.3  /  DBili  x   /  AST  14  /  ALT  21  /  AlkPhos  96  03-23

## 2018-03-24 NOTE — PROGRESS NOTE ADULT - PROBLEM SELECTOR PLAN 1
EMB #1: 1R/1A, pAMR 1. DSA negative.  EMB #2: 1R/1A, pAMR 1. DSA negative.  EMB #3: 1R/1A, pAMR1. No DSA. RA 12, PA 40/16/27, PCW 11, PA 67%, CO/CI 6.8/3.6. TTE with mild RV enlargement and dysfunction.  EMB #4: 1R/2, pAMR1. RA 10, PA 39/18, Wedge 18, CO/CI 6/3.2. No effusion on TTE.     Given the increased lymphocytes seen on the fourth biopsy, we will continue prednisone pulse with taper.     The significance of the pAMR is unclear as he has had C4d deposition since the time of transplant. He has low level DSA also of unclear significance. We will follow closely.

## 2018-03-24 NOTE — PROGRESS NOTE ADULT - PROBLEM SELECTOR PLAN 1
continue current medication  prednisone 50mg po taper tonight 45 x2 decrease by 10 qd until 15 mg -ordered  anti rejection meds  oob  daily weights

## 2018-03-24 NOTE — PROGRESS NOTE ADULT - SUBJECTIVE AND OBJECTIVE BOX
[facility] DIVISION OF KIDNEY DISEASES AND HYPERTENSION -- FOLLOW UP NOTE  --------------------------------------------------------------------------------  Chief Complaint:  NORMAN    24 hour events/subjective:  no changes      PAST HISTORY  --------------------------------------------------------------------------------  No significant changes to PMH, PSH, FHx, SHx, unless otherwise noted    ALLERGIES & MEDICATIONS  --------------------------------------------------------------------------------  Allergies    No Known Allergies    Intolerances      Standing Inpatient Medications  ALBUTerol/ipratropium for Nebulization 3 milliLiter(s) Nebulizer every 6 hours  aspirin enteric coated 81 milliGRAM(s) Oral daily  atovaquone Suspension 1500 milliGRAM(s) Oral daily  Calcium Citrate + Vit D, 315 mg/200 Unit 2 Tablet(s) 2 Tablet(s) Oral two times a day  diltiazem    Tablet 60 milliGRAM(s) Oral once  diltiazem    milliGRAM(s) Oral daily  docusate sodium 100 milliGRAM(s) Oral three times a day  famotidine    Tablet 20 milliGRAM(s) Oral daily  heparin  Injectable 19192 Unit(s) SubCutaneous every 12 hours  insulin lispro (HumaLOG) corrective regimen sliding scale   SubCutaneous three times a day before meals  insulin lispro (HumaLOG) corrective regimen sliding scale   SubCutaneous at bedtime  multivitamin 1 Tablet(s) Oral daily  mycophenolate mofetil 1000 milliGRAM(s) Oral <User Schedule>  nystatin    Suspension 179413 Unit(s) Oral every 6 hours  pravastatin 20 milliGRAM(s) Oral at bedtime  predniSONE   Tablet 50 milliGRAM(s) Oral every 12 hours  silver sulfADIAZINE 1% Cream 1 Application(s) Topical two times a day  sodium bicarbonate 650 milliGRAM(s) Oral three times a day  tacrolimus 9 milliGRAM(s) Oral <User Schedule>  valGANciclovir 450 milliGRAM(s) Oral <User Schedule>    PRN Inpatient Medications  acetaminophen   Tablet. 650 milliGRAM(s) Oral every 6 hours PRN      REVIEW OF SYSTEMS  --------------------------------------------------------------------------------  Gen: No weight changes, fatigue, fevers/chills, weakness  Skin: No rashes  Head/Eyes/Ears/Mouth: No headache; Normal hearing; Normal vision w/o blurriness; No sinus pain/discomfort, sore throat  Respiratory: No dyspnea, cough, wheezing, hemoptysis  CV: No chest pain, PND, orthopnea  GI: No abdominal pain, diarrhea, constipation, nausea, vomiting, melena, hematochezia  : No increased frequency, dysuria, hematuria, nocturia  MSK: No joint pain/swelling; no back pain; no edema  Neuro: No dizziness/lightheadedness, weakness, seizures, numbness, tingling  Heme: No easy bruising or bleeding  Endo: No heat/cold intolerance  Psych: No significant nervousness, anxiety, stress, depression    All other systems were reviewed and are negative, except as noted.    VITALS/PHYSICAL EXAM  --------------------------------------------------------------------------------  T(C): 36.1 (03-24-18 @ 07:39), Max: 36.4 (03-23-18 @ 12:30)  HR: 108 (03-24-18 @ 07:39) (103 - 115)  BP: 131/82 (03-24-18 @ 07:39) (131/81 - 149/80)  RR: 18 (03-24-18 @ 07:39) (18 - 18)  SpO2: 100% (03-24-18 @ 07:39) (98% - 100%)  Wt(kg): --        03-23-18 @ 07:01  -  03-24-18 @ 07:00  --------------------------------------------------------  IN: 720 mL / OUT: 550 mL / NET: 170 mL    03-24-18 @ 07:01  -  03-24-18 @ 10:33  --------------------------------------------------------  IN: 360 mL / OUT: 200 mL / NET: 160 mL      Physical Exam:  	Gen: NAD, well-appearing  	HEENT: PERRL, supple neck, clear oropharynx  	Pulm: CTA B/L  	CV: RRR, S1S2; no rub  	Back: No spinal or CVA tenderness; no sacral edema  	Abd: +BS, soft, nontender/nondistended  	: No suprapubic tenderness  	UE: Warm, FROM, no clubbing, intact strength; no edema; no asterixis  	LE: Warm, FROM, no clubbing, intact strength; no edema  	Neuro: No focal deficits, intact gait    LABS/STUDIES  --------------------------------------------------------------------------------              9.0    19.1  >-----------<  504      [03-24-18 @ 07:12]              28.3     139  |  101  |  57  ----------------------------<  140      [03-24-18 @ 07:12]  4.6   |  20  |  2.39        Ca     8.8     [03-24-18 @ 07:12]      Mg     1.9     [03-24-18 @ 07:12]    TPro  6.6  /  Alb  3.3  /  TBili  0.3  /  DBili  x   /  AST  14  /  ALT  21  /  AlkPhos  96  [03-23-18 @ 07:20]    PT/INR: PT 12.1 , INR 1.12       [03-23-18 @ 17:37]  PTT: 30.0       [03-24-18 @ 07:12]      Creatinine Trend:  SCr 2.39 [03-24 @ 07:12]  SCr 3.03 [03-23 @ 07:20]  SCr 3.06 [03-22 @ 07:09]  SCr 3.16 [03-21 @ 21:04]  SCr 3.40 [03-21 @ 07:16]    Urinalysis - [03-23-18 @ 16:03]      Color Yellow / Appearance Slightly Turbid / SG 1.020 / pH 5.0      Gluc Negative / Ketone Negative  / Bili Negative / Urobili Negative       Blood Small / Protein 30 / Leuk Est Small / Nitrite Negative      RBC 5 / WBC 8 / Hyaline 0 / Gran  / Sq Epi  / Non Sq Epi 4 / Bacteria Few    Urine Creatinine 101      [03-19-18 @ 20:01]  Urine Protein 89      [03-19-18 @ 20:01]  Urine Sodium 35      [03-18-18 @ 15:52]  Urine Urea Nitrogen 618      [03-18-18 @ 18:40]  Urine Potassium 26      [03-18-18 @ 15:52]  Urine Chloride <35      [03-18-18 @ 15:52]    Iron 22, TIBC 182, %sat 12      [02-13-18 @ 12:44]  Ferritin 79.0      [02-13-18 @ 12:44]  HbA1c 5.3      [03-18-18 @ 11:20]  TSH 1.48      [02-27-18 @ 08:13]  Lipid: chol 62, TG 33, HDL 17, LDL 38      [06-07-17 @ 06:14]      C3 Complement 135      [03-19-18 @ 15:13]  C4 Complement 37      [03-19-18 @ 15:13]  Free Light Chains: kappa 4.36, lambda 5.71, ratio = 0.76      [03-19 @ 15:13]  Immunofixation Serum:   No Monoclonal Band Identified      [03-19-18 @ 15:13]  Cryoglobulin: Negative      [03-19-18 @ 15:13]

## 2018-03-24 NOTE — PROGRESS NOTE ADULT - SUBJECTIVE AND OBJECTIVE BOX
Subjective hello in bed resting no acute distress "i am watching the rodeo"    VITAL SIGNS    Telemetry:  Sinus tackycardia 110    Vital Signs Last 24 Hrs  T(C): 36.7 (18 @ 12:30), Max: 36.7 (18 @ 12:30)  T(F): 98 (18 @ 12:30), Max: 98 (18 @ 12:30)  HR: 109 (18 @ 12:30) (103 - 115)  BP: 123/76 (18 @ 12:30) (123/76 - 149/80)  RR: 20 (18 @ 12:30) (18 - 20)  SpO2: 99% (18 @ 12:30) (98% - 100%)           3-23 @ 07:01  -   @ 07:00  --------------------------------------------------------  IN: 720 mL / OUT: 550 mL / NET: 170 mL     @ 07:01  -   @ 14:59  --------------------------------------------------------  IN: 600 mL / OUT: 400 mL / NET: 200 mL    Daily Weight in k.7 (24 Mar 2018 06:07)    POCT Blood Glucose.: 194 mg/dL (24 Mar 2018 12:13)  POCT Blood Glucose.: 154 mg/dL (24 Mar 2018 07:56)  POCT Blood Glucose.: 161 mg/dL (23 Mar 2018 22:13)  POCT Blood Glucose.: 178 mg/dL (23 Mar 2018 19:22)  MEDICATIONS  (STANDING):  ALBUTerol/ipratropium for Nebulization 3 milliLiter(s) Nebulizer every 6 hours  aspirin enteric coated 81 milliGRAM(s) Oral daily  atovaquone Suspension 1500 milliGRAM(s) Oral daily  Calcium Citrate + Vit D, 315 mg/200 Unit 2 Tablet(s) 2 Tablet(s) Oral two times a day  diltiazem    milliGRAM(s) Oral daily  docusate sodium 100 milliGRAM(s) Oral three times a day  famotidine    Tablet 20 milliGRAM(s) Oral daily  heparin  Injectable 16859 Unit(s) SubCutaneous every 12 hours  insulin lispro (HumaLOG) corrective regimen sliding scale   SubCutaneous three times a day before meals  insulin lispro (HumaLOG) corrective regimen sliding scale   SubCutaneous at bedtime  multivitamin 1 Tablet(s) Oral daily  mycophenolate mofetil 1000 milliGRAM(s) Oral <User Schedule>  nystatin    Suspension 316705 Unit(s) Oral every 6 hours  pravastatin 20 milliGRAM(s) Oral at bedtime  predniSONE   Tablet 45 milliGRAM(s) Oral every 12 hours  silver sulfADIAZINE 1% Cream 1 Application(s) Topical two times a day  sodium bicarbonate 650 milliGRAM(s) Oral three times a day  tacrolimus 9 milliGRAM(s) Oral <User Schedule>  valGANciclovir 450 milliGRAM(s) Oral <User Schedule>    MEDICATIONS  (PRN):  acetaminophen   Tablet. 650 milliGRAM(s) Oral every 6 hours PRN Mild Pain (1 - 3)       Drains:    VAC to abdomen drive line site patent   PHYSICAL EXAM  Neurology: alert and oriented x 3, nonfocal, no gross deficits  CV :S1 S2 RRR  Sternal Wound :  NAZ sternum stable no drainage  Lungs:CTA  Abdomen: soft, nontender, nondistended, VAC patent positive bowel sounds, last bowel movement 3/23  :    Voids         Extremities:   B/L lEwarm well perf used + DP          Physical Therapy Rec:   Home  [  x]   Home w/ PT  [  ]  Rehab  [  ]    Discussed with Cardiothoracic Team at AM rounds.

## 2018-03-24 NOTE — PROGRESS NOTE ADULT - PROBLEM SELECTOR PLAN 1
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  Tacro increased 3/21 and will monitor crt given recent increase due to worsening cardiac rejection. Crt trending down to 2.3 now and urinalysis sediment on my view last evening showed no casts at all, no granular casts suggesting a pre renal insult likely from vasoconstriction from tacro.   Monitor renal function as you increase tacro  Dose abx now as Crt is improving  Hyperkalemia resolved, cont bicarb till crt normalizes

## 2018-03-24 NOTE — PROGRESS NOTE ADULT - ASSESSMENT
Mr. Baez is a 67 year old man with ACC/AHA stage D chronic systolic heart failure due to NICM s/p HM2 LVAD 6/17 c/b pump thrombosis now s/p heart transplant on 2/23 (CMV D-/R+ and Toxo D-/R+), with post-op course c/b primary graft dysfunction, initially thought to be immune-mediated due to positive B-cell flow (negative CDC cross match) and received plasmapharesis/IVIg x2 with improvement in LV function. His course has been complicated by bilateral IJ/subclavian thrombi. He currently has acute renal failure of unclear etiology. However, he does not have clear evidence of allograft dysfunction or low output on exam. 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     # Immunosuppression:  Tacrolimus -  Will continue dosing at 9 mg BID (increased yesterday). Tacro level 7 this morning from 9.4 Target trough level of 12-14. - - cardizem increase 240 qd  CellCept - continue 1000 mg PO BID.   Steroids - will give short pulse of oral steroids. Will give 50 mg PO BID for three days with subsequent taper.  Taper starting 3/24 45 mg x2 the 40 x2  decrease by 10  until 15 mg bid- ordered    #Hypertension  - Increase  cardizem 240 mg daily.   	  # Antimicrobial prophylaxis:  Intermediate risk CMV - Valcyte for NORMAN to 450 mg daily just TWICE weekly.   Intermediate risk Toxo - continue Mepron 1500mg PO daily with food.  PCP - continue Mepron 1500mg PO daily  Thrush - continue Nystatin S/S 5 mL QID    # Bilateral IJ/Subclavian thrombi - unchanged on f/u U/S 3/12  -  heparin 11,800  units subcutaneous Q12 (250 units/kg Q12) per vasc cards recs in setting of renal function.  PTT q 6 hrs after administration dosed by Dr Tavarez  - CHERIE negative    #Acute renal failure, possibly related to vancomycin dosing, improving.   - Vanco stopped.   - He responded to diuretics given on Friday. No further diuresis.     # CAV PPx  - Continue ECASA 81mg PO daily  - Continue pravastatin 20mg PO QHS    # ID   - Pseudomonas in sputum - course of cefepime completed 3/15  - DLES improved and vancomycin stopped given high trough with NORMAN. Per ID, will redose with daptomycin depending upon repeat vanc level this morning.   - silver sulfadizaine dressings per plastics for R 3rd digit.   - HCV viral load and PCR per protocol. Therapy to be initiated as outpatient by Dr. Thomas (Hepatology)  - Transplant ID following    #endo  - BG currently well controlled, ranging 103-157  - SSI AC & HS    # Additional prevention:  - Citracal + D 2 tabs PO BID  - multivitamin 1 tab daily  - Stress ulcer ppx while on steroids: will change pantoprazole to ranitidine per renal recommendations.  - Mag oxide discontinued currently. Mag 1.9  this AM.    #dispo  - Ongoing work with PT for ongoing exercise tolerance prior to d/c    plan d/c home early next week.

## 2018-03-24 NOTE — PROGRESS NOTE ADULT - ASSESSMENT
Mr. Baez is a 67 year old man with ACC/AHA stage D chronic systolic heart failure due to NICM s/p HM2 LVAD 6/17 c/b pump thrombosis now s/p heart transplant on 2/23 (CMV D-/R+ and Toxo D-/R+), with post-op course c/b primary graft dysfunction, initially thought to be immune-mediated due to positive B-cell flow (negative CDC cross match) and received plasmapharesis/IVIg x2 with improvement in LV function. His course has been complicated by bilateral IJ/subclavian thrombi and pseudomonas infection, which was treated. He currently has acute renal failure of unclear etiology, which is improving. His biopsy is consistent with mild worsening in cellular rejection. However, he does not have clear evidence of allograft dysfunction or low output on exam.    Plan discussed with 2 Gonzalo NP team and in multidisciplinary rounds.

## 2018-03-24 NOTE — PROGRESS NOTE ADULT - PROBLEM SELECTOR PLAN 3
Continue atovaquone for PJP and toxoplasmosis prophylaxis  Continue valganciclovir for CMV prophylaxis (intermediate risk)  Continue nystatin for thrush prophylaxis

## 2018-03-24 NOTE — PROGRESS NOTE ADULT - ASSESSMENT
Assessment:  1.  Bilateral, extensive upper extremity DVT  - Currently on lovenox  - Swelling of RUE controlled with elevation and ace wrap  2.  Heart Transplant  3.  Acute renal failure  4.  Right 3rf digit ischemia - stable  5.  Right foot bunion      Plan  1  Agree with increasing dose or heparin and repeat PTT 6 hours after receiving heparin dose   2.  R arm is clinically improving , and hematoma is stable/improved on CT scan - would continue with compression and observation for now.   The arm should always be wrapped with ACE and elevated for now  3.  Ok to hold heparin prior to cardiac biopsies   4.  Plan to repeat upper extremity bilateral venous duplex Monday.        [facility] [id number]

## 2018-03-24 NOTE — PROGRESS NOTE ADULT - SUBJECTIVE AND OBJECTIVE BOX
Subjective: No current complaints. He feels excellent. He has no shortness of breath, cough, or chest pain. No nausea.     Medications:  acetaminophen   Tablet. 650 milliGRAM(s) Oral every 6 hours PRN  ALBUTerol/ipratropium for Nebulization 3 milliLiter(s) Nebulizer every 6 hours  aspirin enteric coated 81 milliGRAM(s) Oral daily  atovaquone Suspension 1500 milliGRAM(s) Oral daily  Calcium Citrate + Vit D, 315 mg/200 Unit 2 Tablet(s) 2 Tablet(s) Oral two times a day  diltiazem    Tablet 60 milliGRAM(s) Oral once  diltiazem    milliGRAM(s) Oral daily  docusate sodium 100 milliGRAM(s) Oral three times a day  famotidine    Tablet 20 milliGRAM(s) Oral daily  heparin  Injectable 18683 Unit(s) SubCutaneous every 12 hours  insulin lispro (HumaLOG) corrective regimen sliding scale   SubCutaneous three times a day before meals  insulin lispro (HumaLOG) corrective regimen sliding scale   SubCutaneous at bedtime  multivitamin 1 Tablet(s) Oral daily  mycophenolate mofetil 1000 milliGRAM(s) Oral <User Schedule>  nystatin    Suspension 625135 Unit(s) Oral every 6 hours  pravastatin 20 milliGRAM(s) Oral at bedtime  predniSONE   Tablet 50 milliGRAM(s) Oral every 12 hours  silver sulfADIAZINE 1% Cream 1 Application(s) Topical two times a day  sodium bicarbonate 650 milliGRAM(s) Oral three times a day  tacrolimus 9 milliGRAM(s) Oral <User Schedule>  valGANciclovir 450 milliGRAM(s) Oral <User Schedule>      Physical Exam:    Vital Signs Last 24 Hrs  T(C): 36.1 (24 Mar 2018 07:39), Max: 36.4 (23 Mar 2018 12:30)  T(F): 97 (24 Mar 2018 07:39), Max: 97.6 (23 Mar 2018 12:30)  HR: 108 (24 Mar 2018 07:39) (103 - 115)  BP: 131/82 (24 Mar 2018 07:39) (131/81 - 149/80)  BP(mean): 110 (24 Mar 2018 05:55) (102 - 110)  RR: 18 (24 Mar 2018 07:39) (18 - 18)  SpO2: 100% (24 Mar 2018 07:39) (98% - 100%)      Daily Weight in k.7 (24 Mar 2018 06:07)    I&O's Summary    23 Mar 2018 07:  -  24 Mar 2018 07:00  --------------------------------------------------------  IN: 720 mL / OUT: 550 mL / NET: 170 mL    24 Mar 2018 07:  -  24 Mar 2018 10:44  --------------------------------------------------------  IN: 360 mL / OUT: 200 mL / NET: 160 mL    General: No distress. Comfortable.  HEENT: EOM intact.  Neck: Neck supple. JVP not elevated. No masses  Chest: Clear to auscultation bilaterally  CV: RRR with normal S1 and S2. No murmurs, rub, or gallops. Radial pulses normal.  Abdomen: Soft, non-distended, non-tender  Skin: No rashes.  Neurology: Alert and oriented times three. Sensation intact  Psych: Affect normal    Labs:                        9.0    19.1  )-----------( 504      ( 24 Mar 2018 07:12 )             28.3         139  |  101  |  57<H>  ----------------------------<  140<H>  4.6   |  20<L>  |  2.39<H>    Ca    8.8      24 Mar 2018 07:12  Mg     1.9         TPro  6.6  /  Alb  3.3  /  TBili  0.3  /  DBili  x   /  AST  14  /  ALT  21  /  AlkPhos  96      PT/INR - ( 23 Mar 2018 17:37 )   PT: 12.1 sec;   INR: 1.12 ratio      PTT - ( 24 Mar 2018 07:12 )  PTT:30.0 sec

## 2018-03-24 NOTE — PROGRESS NOTE ADULT - PROBLEM SELECTOR PLAN 2
Continue prednisone taper. He will receive 45 mg PO tonight.  Continue CellCept 1000 mg BID  His tacrolimus level continues to be low. We will increase diltiazem to 240 mg daily as his blood pressure is increased to also increase tac levels. We increased the tac to 9 mg BID yesterday and will continue this dose.    He will continue on aspirin and pravachol.

## 2018-03-25 LAB
ANION GAP SERPL CALC-SCNC: 15 MMOL/L — SIGNIFICANT CHANGE UP (ref 5–17)
APTT BLD: 53.9 SEC — HIGH (ref 27.5–37.4)
APTT BLD: 72.1 SEC — HIGH (ref 27.5–37.4)
BUN SERPL-MCNC: 57 MG/DL — HIGH (ref 7–23)
CALCIUM SERPL-MCNC: 9 MG/DL — SIGNIFICANT CHANGE UP (ref 8.4–10.5)
CHLORIDE SERPL-SCNC: 105 MMOL/L — SIGNIFICANT CHANGE UP (ref 96–108)
CO2 SERPL-SCNC: 21 MMOL/L — LOW (ref 22–31)
CREAT SERPL-MCNC: 2.23 MG/DL — HIGH (ref 0.5–1.3)
GLUCOSE BLDC GLUCOMTR-MCNC: 144 MG/DL — HIGH (ref 70–99)
GLUCOSE BLDC GLUCOMTR-MCNC: 149 MG/DL — HIGH (ref 70–99)
GLUCOSE BLDC GLUCOMTR-MCNC: 213 MG/DL — HIGH (ref 70–99)
GLUCOSE BLDC GLUCOMTR-MCNC: 97 MG/DL — SIGNIFICANT CHANGE UP (ref 70–99)
GLUCOSE SERPL-MCNC: 135 MG/DL — HIGH (ref 70–99)
HCT VFR BLD CALC: 28.1 % — LOW (ref 39–50)
HGB BLD-MCNC: 8.9 G/DL — LOW (ref 13–17)
INR BLD: 1.15 RATIO — SIGNIFICANT CHANGE UP (ref 0.88–1.16)
MCHC RBC-ENTMCNC: 29.4 PG — SIGNIFICANT CHANGE UP (ref 27–34)
MCHC RBC-ENTMCNC: 31.8 GM/DL — LOW (ref 32–36)
MCV RBC AUTO: 92.6 FL — SIGNIFICANT CHANGE UP (ref 80–100)
PLATELET # BLD AUTO: 526 K/UL — HIGH (ref 150–400)
POTASSIUM SERPL-MCNC: 5 MMOL/L — SIGNIFICANT CHANGE UP (ref 3.5–5.3)
POTASSIUM SERPL-SCNC: 5 MMOL/L — SIGNIFICANT CHANGE UP (ref 3.5–5.3)
PROTHROM AB SERPL-ACNC: 12.6 SEC — SIGNIFICANT CHANGE UP (ref 9.8–12.7)
RBC # BLD: 3.03 M/UL — LOW (ref 4.2–5.8)
RBC # FLD: 17.5 % — HIGH (ref 10.3–14.5)
SODIUM SERPL-SCNC: 141 MMOL/L — SIGNIFICANT CHANGE UP (ref 135–145)
TACROLIMUS SERPL-MCNC: 10.1 NG/ML — SIGNIFICANT CHANGE UP
WBC # BLD: 20.9 K/UL — HIGH (ref 3.8–10.5)
WBC # FLD AUTO: 20.9 K/UL — HIGH (ref 3.8–10.5)

## 2018-03-25 PROCEDURE — 99233 SBSQ HOSP IP/OBS HIGH 50: CPT

## 2018-03-25 PROCEDURE — 71045 X-RAY EXAM CHEST 1 VIEW: CPT | Mod: 26

## 2018-03-25 RX ADMIN — HEPARIN SODIUM 13000 UNIT(S): 5000 INJECTION INTRAVENOUS; SUBCUTANEOUS at 11:52

## 2018-03-25 RX ADMIN — Medication 3 MILLILITER(S): at 17:28

## 2018-03-25 RX ADMIN — Medication 650 MILLIGRAM(S): at 06:08

## 2018-03-25 RX ADMIN — Medication 650 MILLIGRAM(S): at 23:34

## 2018-03-25 RX ADMIN — Medication 500000 UNIT(S): at 17:28

## 2018-03-25 RX ADMIN — Medication 3 MILLILITER(S): at 06:07

## 2018-03-25 RX ADMIN — ATOVAQUONE 1500 MILLIGRAM(S): 750 SUSPENSION ORAL at 11:50

## 2018-03-25 RX ADMIN — Medication 650 MILLIGRAM(S): at 14:27

## 2018-03-25 RX ADMIN — MYCOPHENOLATE MOFETIL 1000 MILLIGRAM(S): 250 CAPSULE ORAL at 19:56

## 2018-03-25 RX ADMIN — Medication 20 MILLIGRAM(S): at 23:34

## 2018-03-25 RX ADMIN — Medication 45 MILLIGRAM(S): at 06:08

## 2018-03-25 RX ADMIN — Medication 500000 UNIT(S): at 11:50

## 2018-03-25 RX ADMIN — Medication 240 MILLIGRAM(S): at 06:08

## 2018-03-25 RX ADMIN — Medication 500000 UNIT(S): at 06:08

## 2018-03-25 RX ADMIN — Medication 3 MILLILITER(S): at 23:33

## 2018-03-25 RX ADMIN — Medication 3 MILLILITER(S): at 11:50

## 2018-03-25 RX ADMIN — Medication 500000 UNIT(S): at 23:34

## 2018-03-25 RX ADMIN — HEPARIN SODIUM 13000 UNIT(S): 5000 INJECTION INTRAVENOUS; SUBCUTANEOUS at 23:34

## 2018-03-25 RX ADMIN — Medication 1 APPLICATION(S): at 17:28

## 2018-03-25 RX ADMIN — MYCOPHENOLATE MOFETIL 1000 MILLIGRAM(S): 250 CAPSULE ORAL at 07:59

## 2018-03-25 RX ADMIN — Medication 1 APPLICATION(S): at 06:09

## 2018-03-25 RX ADMIN — Medication 40 MILLIGRAM(S): at 17:27

## 2018-03-25 RX ADMIN — TACROLIMUS 9 MILLIGRAM(S): 5 CAPSULE ORAL at 19:55

## 2018-03-25 RX ADMIN — Medication 1 TABLET(S): at 11:54

## 2018-03-25 RX ADMIN — FAMOTIDINE 20 MILLIGRAM(S): 10 INJECTION INTRAVENOUS at 11:52

## 2018-03-25 RX ADMIN — TACROLIMUS 9 MILLIGRAM(S): 5 CAPSULE ORAL at 07:59

## 2018-03-25 RX ADMIN — Medication 81 MILLIGRAM(S): at 11:50

## 2018-03-25 RX ADMIN — Medication 4: at 11:51

## 2018-03-25 NOTE — PROGRESS NOTE ADULT - PROBLEM SELECTOR PLAN 1
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  Tacro increased 3/21 and will monitor crt given recent increase due to worsening cardiac rejection. Crt trending down now and urinalysis sediment on my view last evening showed no casts at all, no granular casts suggesting a pre renal insult likely from vasoconstriction from tacro.   Monitor renal function as you increase tacro  Dose abx now as Crt is improving  Hyperkalemia resolved, cont bicarb till crt normalizes  Can dose valcyte as renal function improves to more frequent  Ok to start bactrim if need be in next 2 days

## 2018-03-25 NOTE — PROGRESS NOTE ADULT - SUBJECTIVE AND OBJECTIVE BOX
Subjective: No current complaints. He feels well and is breathing without significant shortness of breath.     Medications:  acetaminophen   Tablet. 650 milliGRAM(s) Oral every 6 hours PRN  ALBUTerol/ipratropium for Nebulization 3 milliLiter(s) Nebulizer every 6 hours  aspirin enteric coated 81 milliGRAM(s) Oral daily  atovaquone Suspension 1500 milliGRAM(s) Oral daily  Calcium Citrate + Vit D, 315 mg/200 Unit 2 Tablet(s) 2 Tablet(s) Oral two times a day  diltiazem    milliGRAM(s) Oral daily  docusate sodium 100 milliGRAM(s) Oral three times a day  famotidine    Tablet 20 milliGRAM(s) Oral daily  heparin  Injectable 96204 Unit(s) SubCutaneous every 12 hours  insulin lispro (HumaLOG) corrective regimen sliding scale   SubCutaneous three times a day before meals  insulin lispro (HumaLOG) corrective regimen sliding scale   SubCutaneous at bedtime  multivitamin 1 Tablet(s) Oral daily  mycophenolate mofetil 1000 milliGRAM(s) Oral <User Schedule>  nystatin    Suspension 025546 Unit(s) Oral every 6 hours  pravastatin 20 milliGRAM(s) Oral at bedtime  predniSONE   Tablet 40 milliGRAM(s) Oral every 12 hours  silver sulfADIAZINE 1% Cream 1 Application(s) Topical two times a day  sodium bicarbonate 650 milliGRAM(s) Oral three times a day  tacrolimus 9 milliGRAM(s) Oral <User Schedule>  valGANciclovir 450 milliGRAM(s) Oral <User Schedule>    Physical Exam:    Vital Signs Last 24 Hrs  T(C): 36.2 (25 Mar 2018 08:03), Max: 36.7 (24 Mar 2018 12:30)  T(F): 97.2 (25 Mar 2018 08:03), Max: 98.1 (24 Mar 2018 23:40)  HR: 105 (25 Mar 2018 08:03) (98 - 109)  BP: 123/73 (25 Mar 2018 08:03) (114/70 - 123/76)  BP(mean): 85 (24 Mar 2018 23:40) (85 - 94)  RR: 16 (25 Mar 2018 08:03) (16 - 20)  SpO2: 96% (25 Mar 2018 08:03) (96% - 100%)     Daily Weight in k.4 (25 Mar 2018 07:08)    I&O's Summary    24 Mar 2018 07:  -  25 Mar 2018 07:00  --------------------------------------------------------  IN: 940 mL / OUT: 1150 mL / NET: -210 mL    25 Mar 2018 07:  -  25 Mar 2018 11:50  --------------------------------------------------------  IN: 360 mL / OUT: 200 mL / NET: 160 mL      General: No distress. Comfortable.  HEENT: EOM intact.  Neck: Neck supple. JVP not elevated. No masses  Chest: Slightly diminished breath sounds at right lung base.   CV: Tachycardic, regular. Normal S1 and S2. No murmurs, rub, or gallops. Radial pulses normal.  Abdomen: Soft, non-distended, non-tender  Skin: No rashes or skin breakdown  Neurology: Alert and oriented times three. Sensation intact  Psych: Affect normal    Labs:                        8.9    20.9  )-----------( 526      ( 25 Mar 2018 07:35 )             28.1         141  |  105  |  57<H>  ----------------------------<  135<H>  5.0   |  21<L>  |  2.23<H>    Ca    9.0      25 Mar 2018 07:32  Mg     1.9     03-24    PT/INR - ( 25 Mar 2018 07:32 )   PT: 12.6 sec;   INR: 1.15 ratio       PTT - ( 25 Mar 2018 07:32 )  PTT:72.1 sec

## 2018-03-25 NOTE — PROGRESS NOTE ADULT - SUBJECTIVE AND OBJECTIVE BOX
VASCULAR MEDICINE                         CC:  UEDVT    INTERVAL HISTORY:     No CP or SOB. R arm without symptoms.  Creat improving.   Allergies    No Known Allergies    MEDICATIONS  (STANDING):  ALBUTerol/ipratropium for Nebulization 3 milliLiter(s) Nebulizer every 6 hours  aspirin enteric coated 81 milliGRAM(s) Oral daily  atovaquone Suspension 1500 milliGRAM(s) Oral daily  Calcium Citrate + Vit D, 315 mg/200 Unit 2 Tablet(s) 2 Tablet(s) Oral two times a day  diltiazem    milliGRAM(s) Oral daily  docusate sodium 100 milliGRAM(s) Oral three times a day  famotidine    Tablet 20 milliGRAM(s) Oral daily  heparin  Injectable 87909 Unit(s) SubCutaneous every 12 hours  insulin lispro (HumaLOG) corrective regimen sliding scale   SubCutaneous three times a day before meals  insulin lispro (HumaLOG) corrective regimen sliding scale   SubCutaneous at bedtime  multivitamin 1 Tablet(s) Oral daily  mycophenolate mofetil 1000 milliGRAM(s) Oral <User Schedule>  nystatin    Suspension 947272 Unit(s) Oral every 6 hours  pravastatin 20 milliGRAM(s) Oral at bedtime  predniSONE   Tablet 40 milliGRAM(s) Oral every 12 hours  silver sulfADIAZINE 1% Cream 1 Application(s) Topical two times a day  sodium bicarbonate 650 milliGRAM(s) Oral three times a day  tacrolimus 9 milliGRAM(s) Oral <User Schedule>  valGANciclovir 450 milliGRAM(s) Oral <User Schedule>    MEDICATIONS  (PRN):  acetaminophen   Tablet. 650 milliGRAM(s) Oral every 6 hours PRN Mild Pain (1 - 3)    SOCIAL HISTORY:  unchanged    REVIEW OF SYSTEMS:  CONSTITUTIONAL: No fever, weight loss, or fatigue  EYES: No eye pain, visual disturbances, or discharge  ENMT:  No difficulty hearing, tinnitus, vertigo; No sinus or throat pain  NECK: No pain or stiffness  RESPIRATORY: No cough, wheezing, chills or hemoptysis; No Shortness of Breath  CARDIOVASCULAR: No chest pain, palpitations, passing out, dizziness, or leg swelling  GASTROINTESTINAL: No abdominal or epigastric pain. No nausea, vomiting, or hematemesis; No diarrhea or constipation. No melena or hematochezia.  GENITOURINARY: No dysuria, frequency, hematuria, or incontinence  NEUROLOGICAL: No headaches, memory loss, loss of strength, numbness, or tremors  SKIN: No itching, burning, rashes, or lesions   LYMPH Nodes: No enlarged glands  ENDOCRINE: No heat or cold intolerance; No hair loss  MUSCULOSKELETAL: No joint pain or swelling; No muscle, back, or extremity pain  PSYCHIATRIC: No depression, anxiety, mood swings, or difficulty sleeping  HEME/LYMPH: No easy bruising, or bleeding gums  ALLERY AND IMMUNOLOGIC: No hives or eczema	    [x ] All others negative	  [ ] Unable to obtain    ICU Vital Signs Last 24 Hrs  T(C): 36.6 (25 Mar 2018 15:32), Max: 36.7 (24 Mar 2018 23:40)  T(F): 97.9 (25 Mar 2018 15:32), Max: 98.1 (24 Mar 2018 23:40)  HR: 102 (25 Mar 2018 15:32) (98 - 108)  BP: 120/78 (25 Mar 2018 15:32) (114/70 - 125/81)  BP(mean): 85 (24 Mar 2018 23:40) (85 - 85)  ABP: --  ABP(mean): --  RR: 18 (25 Mar 2018 15:32) (16 - 18)  SpO2: 98% (25 Mar 2018 15:32) (96% - 100%)    SpO2: 100% (24 Mar 2018 19:58) (99% - 100%)  Appearance: Normal	  HEENT:   Normal oral mucosa, PERRL, EOMI	  Lymphatic: No lymphadenopathy  Cardiovascular: Normal S1 S2   Respiratory: Lungs clear to auscultation	  Psychiatry: A & O x 3, Mood & affect appropriate  Gastrointestinal:  Soft, Non-tender, + BS	  Skin: No rashes, No ecchymoses, No cyanosis	  Neurologic: Non-focal  Extremities:  Right upper extremity bicep area fullness and edema which is improving.  Forearm without edema.  Hands are warm.  R 3rd digit is wrapped.                                         8.9    20.9  )-----------( 526      ( 25 Mar 2018 07:35 )             28.1   03-25    141  |  105  |  57<H>  ----------------------------<  135<H>  5.0   |  21<L>  |  2.23<H>    Ca    9.0      25 Mar 2018 07:32  Mg     1.9     03-24  PT/INR - ( 25 Mar 2018 07:32 )   PT: 12.6 sec;   INR: 1.15 ratio         PTT - ( 25 Mar 2018 07:32 )  PTT:72.1 sec

## 2018-03-25 NOTE — PROGRESS NOTE ADULT - ASSESSMENT
Assessment:  1.  Bilateral, extensive upper extremity DVT  - Currently on lovenox  - Swelling of RUE controlled with elevation and ace wrap  2.  Heart Transplant  3.  Acute renal failure  4.  Right 3rf digit ischemia - stable  5.  Right foot bunion      Plan  1  PTT at goal.  Continue heparin at current dose.    2.  R arm is clinically improving , and hematoma is stable/improved on CT scan - would continue with compression and observation for now.   The arm should always be wrapped with ACE and elevated for now  3.  Ok to hold heparin prior to cardiac biopsies   4.  Plan to repeat upper extremity bilateral venous duplex Monday.    5.  If creatinine clearance improves to above 30 and shows stability, I will discuss with Dr. Parker regarding switching to Eliquis 5mg BID and holding 24 hours prior to biopsies.  Would cont heparin for now.      [place] [id number]

## 2018-03-25 NOTE — PROGRESS NOTE ADULT - PROBLEM SELECTOR PLAN 4
Continue subcutaneous heparin given bilateral DVTs. Will adjust dosing as necessary to achieve therapeutic aPTT. Repeat ultrasound of upper extremities on 3/26.

## 2018-03-25 NOTE — PROGRESS NOTE ADULT - SUBJECTIVE AND OBJECTIVE BOX
[facility] DIVISION OF KIDNEY DISEASES AND HYPERTENSION -- FOLLOW UP NOTE  --------------------------------------------------------------------------------  Chief Complaint:  NORMAN    24 hour events/subjective:  no major events      PAST HISTORY  --------------------------------------------------------------------------------  No significant changes to PMH, PSH, FHx, SHx, unless otherwise noted    ALLERGIES & MEDICATIONS  --------------------------------------------------------------------------------  Allergies    No Known Allergies    Intolerances      Standing Inpatient Medications  ALBUTerol/ipratropium for Nebulization 3 milliLiter(s) Nebulizer every 6 hours  aspirin enteric coated 81 milliGRAM(s) Oral daily  atovaquone Suspension 1500 milliGRAM(s) Oral daily  Calcium Citrate + Vit D, 315 mg/200 Unit 2 Tablet(s) 2 Tablet(s) Oral two times a day  diltiazem    milliGRAM(s) Oral daily  docusate sodium 100 milliGRAM(s) Oral three times a day  famotidine    Tablet 20 milliGRAM(s) Oral daily  heparin  Injectable 19243 Unit(s) SubCutaneous every 12 hours  insulin lispro (HumaLOG) corrective regimen sliding scale   SubCutaneous three times a day before meals  insulin lispro (HumaLOG) corrective regimen sliding scale   SubCutaneous at bedtime  multivitamin 1 Tablet(s) Oral daily  mycophenolate mofetil 1000 milliGRAM(s) Oral <User Schedule>  nystatin    Suspension 942551 Unit(s) Oral every 6 hours  pravastatin 20 milliGRAM(s) Oral at bedtime  predniSONE   Tablet 40 milliGRAM(s) Oral every 12 hours  silver sulfADIAZINE 1% Cream 1 Application(s) Topical two times a day  sodium bicarbonate 650 milliGRAM(s) Oral three times a day  tacrolimus 9 milliGRAM(s) Oral <User Schedule>  valGANciclovir 450 milliGRAM(s) Oral <User Schedule>    PRN Inpatient Medications  acetaminophen   Tablet. 650 milliGRAM(s) Oral every 6 hours PRN      REVIEW OF SYSTEMS  --------------------------------------------------------------------------------  Gen: No weight changes, fatigue, fevers/chills, weakness  Skin: No rashes  Head/Eyes/Ears/Mouth: No headache; Normal hearing; Normal vision w/o blurriness; No sinus pain/discomfort, sore throat  Respiratory: No dyspnea, cough, wheezing, hemoptysis  CV: No chest pain, PND, orthopnea  GI: No abdominal pain, diarrhea, constipation, nausea, vomiting, melena, hematochezia  : No increased frequency, dysuria, hematuria, nocturia  MSK: No joint pain/swelling; no back pain; no edema  Neuro: No dizziness/lightheadedness, weakness, seizures, numbness, tingling  Heme: No easy bruising or bleeding  Endo: No heat/cold intolerance  Psych: No significant nervousness, anxiety, stress, depression    All other systems were reviewed and are negative, except as noted.    VITALS/PHYSICAL EXAM  --------------------------------------------------------------------------------  T(C): 36.2 (03-25-18 @ 08:03), Max: 36.7 (03-24-18 @ 12:30)  HR: 105 (03-25-18 @ 08:03) (98 - 109)  BP: 123/73 (03-25-18 @ 08:03) (114/70 - 123/76)  RR: 16 (03-25-18 @ 08:03) (16 - 20)  SpO2: 96% (03-25-18 @ 08:03) (96% - 100%)  Wt(kg): --        03-24-18 @ 07:01  -  03-25-18 @ 07:00  --------------------------------------------------------  IN: 940 mL / OUT: 1150 mL / NET: -210 mL    03-25-18 @ 07:01  -  03-25-18 @ 11:24  --------------------------------------------------------  IN: 360 mL / OUT: 200 mL / NET: 160 mL      Physical Exam:  	Gen: NAD, well-appearing  	HEENT: PERRL, supple neck, clear oropharynx  	Pulm: CTA B/L  	CV: RRR, S1S2; no rub  	Back: No spinal or CVA tenderness; no sacral edema  	Abd: +BS, soft, nontender/nondistended  	: No suprapubic tenderness  	UE: Warm, FROM, no clubbing, intact strength; no edema; no asterixis  	LE: Warm, FROM, no clubbing, intact strength; no edema        LABS/STUDIES  --------------------------------------------------------------------------------              8.9    20.9  >-----------<  526      [03-25-18 @ 07:35]              28.1     141  |  105  |  57  ----------------------------<  135      [03-25-18 @ 07:32]  5.0   |  21  |  2.23        Ca     9.0     [03-25-18 @ 07:32]      Mg     1.9     [03-24-18 @ 07:12]      PT/INR: PT 12.6 , INR 1.15       [03-25-18 @ 07:32]  PTT: 72.1       [03-25-18 @ 07:32]      Creatinine Trend:  SCr 2.23 [03-25 @ 07:32]  SCr 2.39 [03-24 @ 07:12]  SCr 3.03 [03-23 @ 07:20]  SCr 3.06 [03-22 @ 07:09]  SCr 3.16 [03-21 @ 21:04]    Urinalysis - [03-23-18 @ 16:03]      Color Yellow / Appearance Slightly Turbid / SG 1.020 / pH 5.0      Gluc Negative / Ketone Negative  / Bili Negative / Urobili Negative       Blood Small / Protein 30 / Leuk Est Small / Nitrite Negative      RBC 5 / WBC 8 / Hyaline 0 / Gran  / Sq Epi  / Non Sq Epi 4 / Bacteria Few    Urine Creatinine 101      [03-19-18 @ 20:01]  Urine Protein 89      [03-19-18 @ 20:01]  Urine Sodium 35      [03-18-18 @ 15:52]  Urine Urea Nitrogen 618      [03-18-18 @ 18:40]  Urine Potassium 26      [03-18-18 @ 15:52]  Urine Chloride <35      [03-18-18 @ 15:52]    Iron 22, TIBC 182, %sat 12      [02-13-18 @ 12:44]  Ferritin 79.0      [02-13-18 @ 12:44]  HbA1c 5.3      [03-18-18 @ 11:20]  TSH 1.48      [02-27-18 @ 08:13]  Lipid: chol 62, TG 33, HDL 17, LDL 38      [06-07-17 @ 06:14]      C3 Complement 135      [03-19-18 @ 15:13]  C4 Complement 37      [03-19-18 @ 15:13]  Free Light Chains: kappa 4.36, lambda 5.71, ratio = 0.76      [03-19 @ 15:13]  Immunofixation Serum:   No Monoclonal Band Identified      [03-19-18 @ 15:13]  Cryoglobulin: Negative      [03-19-18 @ 15:13]

## 2018-03-25 NOTE — PROGRESS NOTE ADULT - PROBLEM SELECTOR PLAN 2
Continue prednisone taper. He will receive 40 mg PO tonight.  Continue CellCept 1000 mg BID  His tacrolimus level continues to be low, but has increased to 10. We will continue 9 mg PO BID and current dose of diltiazem.   We will continue on aspirin and Pravachol.

## 2018-03-25 NOTE — PROGRESS NOTE ADULT - PROBLEM SELECTOR PLAN 1
EMB #1: 1R/1A, pAMR 1. DSA negative.  EMB #2: 1R/1A, pAMR 1. DSA negative.  EMB #3: 1R/1A, pAMR1. No DSA. RA 12, PA 40/16/27, PCW 11, PA 67%, CO/CI 6.8/3.6. TTE with mild RV enlargement and dysfunction.  EMB #4: 1R/2, pAMR1. RA 10, PA 39/18, Wedge 18, CO/CI 6/3.2.     Given the increased lymphocytes seen on the fourth biopsy, we will continue prednisone pulse with taper.     The significance of the pAMR is unclear as he has had C4d deposition since the time of transplant. He has low level DSA also of unclear significance. We will follow closely. We will resend DSA on 3/26.

## 2018-03-25 NOTE — PROGRESS NOTE ADULT - SUBJECTIVE AND OBJECTIVE BOX
Subjective  hello ambulating "I would like some ice cream"    VITAL SIGNS    Telemetry:      Vital Signs Last 24 Hrs  T(C): 36.2 (18 @ 08:03), Max: 36.7 (18 @ 12:30)  T(F): 97.2 (18 @ 08:03), Max: 98.1 (18 @ 23:40)  HR: 105 (18 @ 08:03) (98 - 109)  BP: 123/73 (18 @ 08:03) (114/70 - 123/76)  RR: 16 (18 @ 08:03) (16 - 20)  SpO2: 96% (18 @ 08:03) (96% - 100%)            @ 07:01  -   @ 07:00  --------------------------------------------------------  IN: 940 mL / OUT: 1150 mL / NET: -210 mL     @ 07:01  -   @ 10:32  --------------------------------------------------------  IN: 360 mL / OUT: 200 mL / NET: 160 mL      Daily Weight in k.4 (25 Mar 2018 07:08      POCT Blood Glucose.: 149 mg/dL (25 Mar 2018 07:34)  POCT Blood Glucose.: 168 mg/dL (24 Mar 2018 21:34)  POCT Blood Glucose.: 142 mg/dL (24 Mar 2018 19:13)  POCT Blood Glucose.: 194 mg/dL (24 Mar 2018 12:13)  MEDICATIONS  (STANDING):  ALBUTerol/ipratropium for Nebulization 3 milliLiter(s) Nebulizer every 6 hours  aspirin enteric coated 81 milliGRAM(s) Oral daily  atovaquone Suspension 1500 milliGRAM(s) Oral daily  Calcium Citrate + Vit D, 315 mg/200 Unit 2 Tablet(s) 2 Tablet(s) Oral two times a day  diltiazem    milliGRAM(s) Oral daily  docusate sodium 100 milliGRAM(s) Oral three times a day  famotidine    Tablet 20 milliGRAM(s) Oral daily  heparin  Injectable 26149 Unit(s) SubCutaneous every 12 hours  insulin lispro (HumaLOG) corrective regimen sliding scale   SubCutaneous three times a day before meals  insulin lispro (HumaLOG) corrective regimen sliding scale   SubCutaneous at bedtime  multivitamin 1 Tablet(s) Oral daily  mycophenolate mofetil 1000 milliGRAM(s) Oral <User Schedule>  nystatin    Suspension 278752 Unit(s) Oral every 6 hours  pravastatin 20 milliGRAM(s) Oral at bedtime  predniSONE   Tablet 40 milliGRAM(s) Oral every 12 hours  silver sulfADIAZINE 1% Cream 1 Application(s) Topical two times a day  sodium bicarbonate 650 milliGRAM(s) Oral three times a day  tacrolimus 9 milliGRAM(s) Oral <User Schedule>  valGANciclovir 450 milliGRAM(s) Oral <User Schedule>    MEDICATIONS  (PRN):  acetaminophen   Tablet. 650 milliGRAM(s) Oral every 6 hours PRN Mild Pain (1 - 3)            Drains:   Vac patent      PHYSICAL EXAM  Neurology: alert and oriented x 3, nonfocal, no gross deficits  CV :S1 S2 RRR  Sternal Wound :  CDI , Stable  Lungs:B/L breath soumds Rale in RT base    Abdomen: VAC soft, nontender, nondistended, positive bowel sounds, last bowel movement 3/24  :   VOIDS          Extremities: b/l le warm well perfused              Physical Therapy Rec:   Home  [x  ]   Home w/ PT  [  ]  Rehab  [  ]    Discussed with Cardiothoracic Team at AM rounds.

## 2018-03-25 NOTE — PROGRESS NOTE ADULT - PROBLEM SELECTOR PLAN 3
Continue atovaquone for PJP and toxoplasmosis prophylaxis  Continue valganciclovir for CMV prophylaxis (intermediate risk). We will adjust dosing given improvement in renal function.   Continue nystatin for thrush prophylaxis

## 2018-03-25 NOTE — PROGRESS NOTE ADULT - ASSESSMENT
Mr. Baez is a 67 year old man with ACC/AHA stage D chronic systolic heart failure due to NICM s/p HM2 LVAD 6/17 c/b pump thrombosis now s/p heart transplant on 2/23 (CMV D-/R+ and Toxo D-/R+), with post-op course c/b primary graft dysfunction, initially thought to be immune-mediated due to positive B-cell flow (negative CDC cross match) and received plasmapharesis/IVIg x2 with improvement in LV function. His course has been complicated by bilateral IJ/subclavian thrombi. He currently has acute renal failure of unclear etiology. However, he does not have clear evidence of allograft dysfunction or low output on exam. 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     # Immunosuppression:  Tacrolimus -  Will continue dosing at 9 mg BID (increased yesterday). Tacro level 7 this morning from 9.4 Target trough level of 12-14. - - cardizem  240 qd  CellCept - continue 1000 mg PO BID.   Steroids - will give short pulse of oral steroids. Will give 50 mg PO BID for three days with subsequent taper.  Taper starting 3/24 45 mg x2 the 40 x2  decrease by 10  until 15 mg bid- ordered    #Hypertension  - Increase  cardizem 240 mg daily.   	  # Antimicrobial prophylaxis:  Intermediate risk CMV - Valcyte for NORMAN to 450 mg daily just TWICE weekly.   Intermediate risk Toxo - continue Mepron 1500mg PO daily with food.  PCP - continue Mepron 1500mg PO daily  Thrush - continue Nystatin S/S 5 mL QID    # Bilateral IJ/Subclavian thrombi - unchanged on f/u U/S 3/12  -  heparin 13,000 units subcutaneous Q12 (250 units/kg Q12) per vasc cards recs in setting of renal function.  PTT q 6 hrs after administration dosed by Dr Tavarez  - CHERIE negative  Plan for Venous duplex UE on monday per Dr Tavarez    #Acute renal failure, possibly related to vancomycin dosing, improving.   - Vanco stopped.   - He responded to diuretics given on Friday. No further diuresis.     # CAV PPx  - Continue ECASA 81mg PO daily  - Continue pravastatin 20mg PO QHS    # ID   - Pseudomonas in sputum - course of cefepime completed 3/15  - DLES improved and vancomycin stopped given high trough with NORMAN. Per ID, will redose with daptomycin depending upon repeat vanc level this morning.   - silver sulfadizaine dressings per plastics for R 3rd digit.   - HCV viral load and PCR per protocol. Therapy to be initiated as outpatient by Dr. Thomas (Hepatology)  - Transplant ID following    #endo  - BG currently well controlled, ranging 103-157  - SSI AC & HS    # Additional prevention:  - Citracal + D 2 tabs PO BID  - multivitamin 1 tab daily  - Stress ulcer ppx while on steroids: will change pantoprazole to ranitidine per renal recommendations.  - Mag oxide discontinued currently. Mag 1.9  this AM.    #dispo  - Ongoing work with PT for ongoing exercise tolerance prior to d/c    plan d/c home early next week.

## 2018-03-26 LAB
ANION GAP SERPL CALC-SCNC: 13 MMOL/L — SIGNIFICANT CHANGE UP (ref 5–17)
APTT BLD: 36.6 SEC — SIGNIFICANT CHANGE UP (ref 27.5–37.4)
APTT BLD: 41.8 SEC — HIGH (ref 27.5–37.4)
BUN SERPL-MCNC: 63 MG/DL — HIGH (ref 7–23)
CALCIUM SERPL-MCNC: 9.1 MG/DL — SIGNIFICANT CHANGE UP (ref 8.4–10.5)
CHLORIDE SERPL-SCNC: 105 MMOL/L — SIGNIFICANT CHANGE UP (ref 96–108)
CO2 SERPL-SCNC: 24 MMOL/L — SIGNIFICANT CHANGE UP (ref 22–31)
CREAT SERPL-MCNC: 2.14 MG/DL — HIGH (ref 0.5–1.3)
GLUCOSE BLDC GLUCOMTR-MCNC: 127 MG/DL — HIGH (ref 70–99)
GLUCOSE BLDC GLUCOMTR-MCNC: 177 MG/DL — HIGH (ref 70–99)
GLUCOSE BLDC GLUCOMTR-MCNC: 212 MG/DL — HIGH (ref 70–99)
GLUCOSE BLDC GLUCOMTR-MCNC: 216 MG/DL — HIGH (ref 70–99)
GLUCOSE SERPL-MCNC: 118 MG/DL — HIGH (ref 70–99)
HCT VFR BLD CALC: 28.6 % — LOW (ref 39–50)
HGB BLD-MCNC: 9.1 G/DL — LOW (ref 13–17)
INR BLD: 1.1 RATIO — SIGNIFICANT CHANGE UP (ref 0.88–1.16)
MCHC RBC-ENTMCNC: 29.5 PG — SIGNIFICANT CHANGE UP (ref 27–34)
MCHC RBC-ENTMCNC: 31.9 GM/DL — LOW (ref 32–36)
MCV RBC AUTO: 92.6 FL — SIGNIFICANT CHANGE UP (ref 80–100)
PLATELET # BLD AUTO: 547 K/UL — HIGH (ref 150–400)
POTASSIUM SERPL-MCNC: 4.8 MMOL/L — SIGNIFICANT CHANGE UP (ref 3.5–5.3)
POTASSIUM SERPL-SCNC: 4.8 MMOL/L — SIGNIFICANT CHANGE UP (ref 3.5–5.3)
PROTHROM AB SERPL-ACNC: 11.9 SEC — SIGNIFICANT CHANGE UP (ref 9.8–12.7)
RBC # BLD: 3.09 M/UL — LOW (ref 4.2–5.8)
RBC # FLD: 17.8 % — HIGH (ref 10.3–14.5)
SODIUM SERPL-SCNC: 142 MMOL/L — SIGNIFICANT CHANGE UP (ref 135–145)
TACROLIMUS SERPL-MCNC: 12.9 NG/ML — SIGNIFICANT CHANGE UP
WBC # BLD: 23.2 K/UL — HIGH (ref 3.8–10.5)
WBC # FLD AUTO: 23.2 K/UL — HIGH (ref 3.8–10.5)

## 2018-03-26 PROCEDURE — 71045 X-RAY EXAM CHEST 1 VIEW: CPT | Mod: 26

## 2018-03-26 PROCEDURE — 99233 SBSQ HOSP IP/OBS HIGH 50: CPT

## 2018-03-26 PROCEDURE — 99232 SBSQ HOSP IP/OBS MODERATE 35: CPT

## 2018-03-26 PROCEDURE — 93970 EXTREMITY STUDY: CPT | Mod: 26

## 2018-03-26 PROCEDURE — 93306 TTE W/DOPPLER COMPLETE: CPT | Mod: 26

## 2018-03-26 RX ORDER — HEPARIN SODIUM 5000 [USP'U]/ML
13000 INJECTION INTRAVENOUS; SUBCUTANEOUS
Qty: 0 | Refills: 0 | Status: DISCONTINUED | OUTPATIENT
Start: 2018-03-26 | End: 2018-03-26

## 2018-03-26 RX ORDER — TACROLIMUS 5 MG/1
8 CAPSULE ORAL
Qty: 0 | Refills: 0 | Status: DISCONTINUED | OUTPATIENT
Start: 2018-03-26 | End: 2018-03-27

## 2018-03-26 RX ORDER — HEPARIN SODIUM 5000 [USP'U]/ML
14000 INJECTION INTRAVENOUS; SUBCUTANEOUS EVERY 12 HOURS
Qty: 0 | Refills: 0 | Status: DISCONTINUED | OUTPATIENT
Start: 2018-03-26 | End: 2018-03-27

## 2018-03-26 RX ADMIN — Medication 100 MILLIGRAM(S): at 13:21

## 2018-03-26 RX ADMIN — Medication 650 MILLIGRAM(S): at 06:47

## 2018-03-26 RX ADMIN — Medication 4: at 20:20

## 2018-03-26 RX ADMIN — Medication 3 MILLILITER(S): at 17:46

## 2018-03-26 RX ADMIN — Medication 3 MILLILITER(S): at 06:46

## 2018-03-26 RX ADMIN — HEPARIN SODIUM 14000 UNIT(S): 5000 INJECTION INTRAVENOUS; SUBCUTANEOUS at 23:17

## 2018-03-26 RX ADMIN — ATOVAQUONE 1500 MILLIGRAM(S): 750 SUSPENSION ORAL at 11:46

## 2018-03-26 RX ADMIN — TACROLIMUS 8 MILLIGRAM(S): 5 CAPSULE ORAL at 19:56

## 2018-03-26 RX ADMIN — Medication 4: at 11:45

## 2018-03-26 RX ADMIN — Medication 500000 UNIT(S): at 17:46

## 2018-03-26 RX ADMIN — MYCOPHENOLATE MOFETIL 1000 MILLIGRAM(S): 250 CAPSULE ORAL at 19:56

## 2018-03-26 RX ADMIN — Medication 40 MILLIGRAM(S): at 06:47

## 2018-03-26 RX ADMIN — Medication 650 MILLIGRAM(S): at 21:17

## 2018-03-26 RX ADMIN — Medication 500000 UNIT(S): at 23:17

## 2018-03-26 RX ADMIN — Medication 35 MILLIGRAM(S): at 17:46

## 2018-03-26 RX ADMIN — Medication 3 MILLILITER(S): at 11:45

## 2018-03-26 RX ADMIN — Medication 1 TABLET(S): at 11:45

## 2018-03-26 RX ADMIN — Medication 240 MILLIGRAM(S): at 06:47

## 2018-03-26 RX ADMIN — Medication 3 MILLILITER(S): at 23:17

## 2018-03-26 RX ADMIN — Medication 1 APPLICATION(S): at 17:46

## 2018-03-26 RX ADMIN — Medication 500000 UNIT(S): at 11:45

## 2018-03-26 RX ADMIN — FAMOTIDINE 20 MILLIGRAM(S): 10 INJECTION INTRAVENOUS at 11:45

## 2018-03-26 RX ADMIN — Medication 650 MILLIGRAM(S): at 13:21

## 2018-03-26 RX ADMIN — Medication 650 MILLIGRAM(S): at 00:04

## 2018-03-26 RX ADMIN — Medication 20 MILLIGRAM(S): at 21:17

## 2018-03-26 RX ADMIN — Medication 1 APPLICATION(S): at 06:48

## 2018-03-26 RX ADMIN — Medication 81 MILLIGRAM(S): at 11:45

## 2018-03-26 RX ADMIN — HEPARIN SODIUM 13000 UNIT(S): 5000 INJECTION INTRAVENOUS; SUBCUTANEOUS at 11:46

## 2018-03-26 RX ADMIN — Medication 500000 UNIT(S): at 06:46

## 2018-03-26 RX ADMIN — MYCOPHENOLATE MOFETIL 1000 MILLIGRAM(S): 250 CAPSULE ORAL at 08:01

## 2018-03-26 RX ADMIN — TACROLIMUS 9 MILLIGRAM(S): 5 CAPSULE ORAL at 08:01

## 2018-03-26 NOTE — PROGRESS NOTE ADULT - SUBJECTIVE AND OBJECTIVE BOX
Subjective:    Medications:  acetaminophen   Tablet. 650 milliGRAM(s) Oral every 6 hours PRN  ALBUTerol/ipratropium for Nebulization 3 milliLiter(s) Nebulizer every 6 hours  aspirin enteric coated 81 milliGRAM(s) Oral daily  atovaquone Suspension 1500 milliGRAM(s) Oral daily  Calcium Citrate + Vit D, 315 mg/200 Unit 2 Tablet(s) 2 Tablet(s) Oral two times a day  diltiazem    milliGRAM(s) Oral daily  docusate sodium 100 milliGRAM(s) Oral three times a day  famotidine    Tablet 20 milliGRAM(s) Oral daily  heparin  Injectable 48375 Unit(s) SubCutaneous every 12 hours  insulin lispro (HumaLOG) corrective regimen sliding scale   SubCutaneous three times a day before meals  insulin lispro (HumaLOG) corrective regimen sliding scale   SubCutaneous at bedtime  multivitamin 1 Tablet(s) Oral daily  mycophenolate mofetil 1000 milliGRAM(s) Oral <User Schedule>  nystatin    Suspension 524520 Unit(s) Oral every 6 hours  pravastatin 20 milliGRAM(s) Oral at bedtime  predniSONE   Tablet 35 milliGRAM(s) Oral every 12 hours  silver sulfADIAZINE 1% Cream 1 Application(s) Topical two times a day  sodium bicarbonate 650 milliGRAM(s) Oral three times a day  tacrolimus 9 milliGRAM(s) Oral <User Schedule>  valGANciclovir 450 milliGRAM(s) Oral <User Schedule>      PHYSICAL EXAM:  Vital Signs Last 24 Hrs  T(C): 36.3 (26 Mar 2018 07:25), Max: 36.6 (25 Mar 2018 15:32)  T(F): 97.3 (26 Mar 2018 07:25), Max: 97.9 (25 Mar 2018 15:32)  HR: 108 (26 Mar 2018 07:25) (99 - 108)  BP: 131/93 (26 Mar 2018 07:25) (120/78 - 136/87)  BP(mean): 101 (26 Mar 2018 04:29) (101 - 101)  RR: 18 (26 Mar 2018 07:25) (16 - 18)  SpO2: 99% (26 Mar 2018 07:25) (96% - 100%)  Daily     Daily Weight in k.2 (26 Mar 2018 07:25)  I&O's Detail    25 Mar 2018 07:01  -  26 Mar 2018 07:00  --------------------------------------------------------  IN:    Oral Fluid: 1070 mL  Total IN: 1070 mL    OUT:    Voided: 1000 mL  Total OUT: 1000 mL    Total NET: 70 mL      26 Mar 2018 07:01  -  26 Mar 2018 10:50  --------------------------------------------------------  IN:    Oral Fluid: 360 mL  Total IN: 360 mL    OUT:    Voided: 350 mL  Total OUT: 350 mL    Total NET: 10 mL      General: A/ox 3, No acute Distress  Neck: Supple,  JVD  Cardiac: S1 S2, No M/R/G  Pulmonary: CTAB, Breathing unlabored, No Rhonchi/Rales/Wheezing  Abdomen: Soft, Non -tender, +BS   Extremities: No Rashes, No edema  Neuro: A/o x 3, No focal deficits  Psch: normal mood , normal affect    LABS:                          9.1    23.2  )-----------( 547      ( 26 Mar 2018 07:15 )             28.6         142  |  105  |  63<H>  ----------------------------<  118<H>  4.8   |  24  |  2.14<H>    Creatinine, Serum: 2.23 mg/dL (18 @ 07:32)  Creatinine, Serum: 2.39 mg/dL (18 @ 07:12)        Tacrolimus (), Serum: 12.9:  (18 @ 09:04)  Tacrolimus (), Serum: 10.1:  (18 @ 08:42)              Ca    9.1      26 Mar 2018 07:15      PT/INR - ( 26 Mar 2018 07:15 )   PT: 11.9 sec;   INR: 1.10 ratio         PTT - ( 26 Mar 2018 07:15 )  PTT:41.8 sec Subjective: Pt sitting in bed lying flat appears euvolemic     Medications:  acetaminophen   Tablet. 650 milliGRAM(s) Oral every 6 hours PRN  ALBUTerol/ipratropium for Nebulization 3 milliLiter(s) Nebulizer every 6 hours  aspirin enteric coated 81 milliGRAM(s) Oral daily  atovaquone Suspension 1500 milliGRAM(s) Oral daily  Calcium Citrate + Vit D, 315 mg/200 Unit 2 Tablet(s) 2 Tablet(s) Oral two times a day  diltiazem    milliGRAM(s) Oral daily  docusate sodium 100 milliGRAM(s) Oral three times a day  famotidine    Tablet 20 milliGRAM(s) Oral daily  heparin  Injectable 26293 Unit(s) SubCutaneous every 12 hours  insulin lispro (HumaLOG) corrective regimen sliding scale   SubCutaneous three times a day before meals  insulin lispro (HumaLOG) corrective regimen sliding scale   SubCutaneous at bedtime  multivitamin 1 Tablet(s) Oral daily  mycophenolate mofetil 1000 milliGRAM(s) Oral <User Schedule>  nystatin    Suspension 945717 Unit(s) Oral every 6 hours  pravastatin 20 milliGRAM(s) Oral at bedtime  predniSONE   Tablet 35 milliGRAM(s) Oral every 12 hours  silver sulfADIAZINE 1% Cream 1 Application(s) Topical two times a day  sodium bicarbonate 650 milliGRAM(s) Oral three times a day  tacrolimus 9 milliGRAM(s) Oral <User Schedule>  valGANciclovir 450 milliGRAM(s) Oral <User Schedule>      PHYSICAL EXAM:  Vital Signs Last 24 Hrs  T(C): 36.3 (26 Mar 2018 07:25), Max: 36.6 (25 Mar 2018 15:32)  T(F): 97.3 (26 Mar 2018 07:25), Max: 97.9 (25 Mar 2018 15:32)  HR: 108 (26 Mar 2018 07:25) (99 - 108)  BP: 131/93 (26 Mar 2018 07:25) (120/78 - 136/87)  BP(mean): 101 (26 Mar 2018 04:29) (101 - 101)  RR: 18 (26 Mar 2018 07:25) (16 - 18)  SpO2: 99% (26 Mar 2018 07:25) (96% - 100%)    Daily Weight in k.2 (26 Mar 2018 07:25)  I&O's Detail    25 Mar 2018 07:01  -  26 Mar 2018 07:00  --------------------------------------------------------  IN:    Oral Fluid: 1070 mL  Total IN: 1070 mL    OUT:    Voided: 1000 mL  Total OUT: 1000 mL    Total NET: 70 mL      26 Mar 2018 07:  -  26 Mar 2018 10:50  --------------------------------------------------------  IN:    Oral Fluid: 360 mL  Total IN: 360 mL    OUT:    Voided: 350 mL  Total OUT: 350 mL    Total NET: 10 mL      General: A/ox 3, No acute Distress  Neck: Supple,  JVD  Cardiac: S1 S2, No M/R/G  Pulmonary: CTAB, Breathing unlabored, No Rhonchi/Rales/Wheezing  Abdomen: Soft, Non -tender, +BS   Extremities: No Rashes, No edema  Neuro: A/o x 3, No focal deficits  Psch: normal mood , normal affect    LABS:                          9.1    23.2  )-----------( 547      ( 26 Mar 2018 07:15 )             28.6         142  |  105  |  63<H>  ----------------------------<  118<H>  4.8   |  24  |  2.14<H>    Creatinine, Serum: 2.23 mg/dL (18 @ 07:32)  Creatinine, Serum: 2.39 mg/dL (18 @ 07:12)        Tacrolimus (), Serum: 12.9:  (18 @ 09:04)  Tacrolimus (), Serum: 10.1:  (18 @ 08:42)              Ca    9.1      26 Mar 2018 07:15      PT/INR - ( 26 Mar 2018 07:15 )   PT: 11.9 sec;   INR: 1.10 ratio         PTT - ( 26 Mar 2018 07:15 )  PTT:41.8 sec

## 2018-03-26 NOTE — PROGRESS NOTE ADULT - PROBLEM SELECTOR PLAN 4
Continue subcutaneous heparin given bilateral DVTs.    Repeat ultrasound of upper extremities on 3/26. Continue subcutaneous heparin given bilateral DVTs.   Repeat ultrasound of upper extremities with improved canalization of veins, however still persistent partial occlusive thrombi

## 2018-03-26 NOTE — PROGRESS NOTE ADULT - ASSESSMENT
Assessment:  1.  Bilateral, extensive upper extremity DVT  - Currently on lovenox  - Swelling of RUE controlled with elevation and ace wrap  2.  Heart Transplant  3.  Acute renal failure  4.  Right 3rf digit ischemia - stable  5.  Right foot bunion      Plan  1  PTT at goal.  Continue heparin at current dose.    2.  R arm is clinically improving , and hematoma is stable/improved on CT scan - would continue with compression and observation for now.   The arm should always be wrapped with ACE and elevated for now  3.  Ok to hold heparin prior to cardiac biopsies   4.  Plan to repeat upper extremity bilateral venous duplex today.    5.  If creatinine clearance improves to above 30 and shows stability, I will discuss with Dr. Parker regarding switching to Eliquis 5mg BID and holding 24 hours prior to biopsies.  Would cont heparin for now.      Daysi [id number]

## 2018-03-26 NOTE — PROGRESS NOTE ADULT - SUBJECTIVE AND OBJECTIVE BOX
VASCULAR MEDICINE                         CC:  UEDVT    INTERVAL HISTORY:     No CP or SOB. R arm without symptoms.  Creat improving.   Allergies    No Known Allergies    MEDICATIONS  (STANDING):  ALBUTerol/ipratropium for Nebulization 3 milliLiter(s) Nebulizer every 6 hours  aspirin enteric coated 81 milliGRAM(s) Oral daily  atovaquone Suspension 1500 milliGRAM(s) Oral daily  Calcium Citrate + Vit D, 315 mg/200 Unit 2 Tablet(s) 2 Tablet(s) Oral two times a day  diltiazem    milliGRAM(s) Oral daily  docusate sodium 100 milliGRAM(s) Oral three times a day  famotidine    Tablet 20 milliGRAM(s) Oral daily  insulin lispro (HumaLOG) corrective regimen sliding scale   SubCutaneous three times a day before meals  insulin lispro (HumaLOG) corrective regimen sliding scale   SubCutaneous at bedtime  multivitamin 1 Tablet(s) Oral daily  mycophenolate mofetil 1000 milliGRAM(s) Oral <User Schedule>  nystatin    Suspension 167278 Unit(s) Oral every 6 hours  pravastatin 20 milliGRAM(s) Oral at bedtime  predniSONE   Tablet 35 milliGRAM(s) Oral every 12 hours  silver sulfADIAZINE 1% Cream 1 Application(s) Topical two times a day  sodium bicarbonate 650 milliGRAM(s) Oral three times a day  tacrolimus 9 milliGRAM(s) Oral <User Schedule>  valGANciclovir 450 milliGRAM(s) Oral <User Schedule>    MEDICATIONS  (PRN):  acetaminophen   Tablet. 650 milliGRAM(s) Oral every 6 hours PRN Mild Pain (1 - 3)      SOCIAL HISTORY:  unchanged    REVIEW OF SYSTEMS:  CONSTITUTIONAL: No fever, weight loss, or fatigue  EYES: No eye pain, visual disturbances, or discharge  ENMT:  No difficulty hearing, tinnitus, vertigo; No sinus or throat pain  NECK: No pain or stiffness  RESPIRATORY: No cough, wheezing, chills or hemoptysis; No Shortness of Breath  CARDIOVASCULAR: No chest pain, palpitations, passing out, dizziness, or leg swelling  GASTROINTESTINAL: No abdominal or epigastric pain. No nausea, vomiting, or hematemesis; No diarrhea or constipation. No melena or hematochezia.  GENITOURINARY: No dysuria, frequency, hematuria, or incontinence  NEUROLOGICAL: No headaches, memory loss, loss of strength, numbness, or tremors  SKIN: No itching, burning, rashes, or lesions   LYMPH Nodes: No enlarged glands  ENDOCRINE: No heat or cold intolerance; No hair loss  MUSCULOSKELETAL: No joint pain or swelling; No muscle, back, or extremity pain  PSYCHIATRIC: No depression, anxiety, mood swings, or difficulty sleeping  HEME/LYMPH: No easy bruising, or bleeding gums  ALLERY AND IMMUNOLOGIC: No hives or eczema	    [x ] All others negative	  [ ] Unable to obtain    ICU Vital Signs Last 24 Hrs  T(C): 36.3 (26 Mar 2018 11:27), Max: 36.6 (25 Mar 2018 15:32)  T(F): 97.3 (26 Mar 2018 11:27), Max: 97.9 (25 Mar 2018 15:32)  HR: 98 (26 Mar 2018 11:27) (98 - 108)  BP: 137/88 (26 Mar 2018 11:27) (120/78 - 137/88)  BP(mean): 101 (26 Mar 2018 04:29) (101 - 101)  ABP: --  ABP(mean): --  RR: 18 (26 Mar 2018 11:27) (17 - 18)  SpO2: 100% (26 Mar 2018 11:27) (98% - 100%)      SpO2: 100% (24 Mar 2018 19:58) (99% - 100%)  Appearance: Normal	  HEENT:   Normal oral mucosa, PERRL, EOMI	  Lymphatic: No lymphadenopathy  Cardiovascular: Normal S1 S2   Respiratory: Lungs clear to auscultation	  Psychiatry: A & O x 3, Mood & affect appropriate  Gastrointestinal:  Soft, Non-tender, + BS	  Skin: No rashes, No ecchymoses, No cyanosis	  Neurologic: Non-focal  Extremities:  Right upper extremity bicep area fullness and edema which is improving.  Forearm without edema.  Hands are warm.  R 3rd digit is wrapped.                            9.1    23.2  )-----------( 547      ( 26 Mar 2018 07:15 )             28.6   03-26    142  |  105  |  63<H>  ----------------------------<  118<H>  4.8   |  24  |  2.14<H>    Ca    9.1      26 Mar 2018 07:15

## 2018-03-26 NOTE — PROGRESS NOTE ADULT - ASSESSMENT
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 Found to have b/l IJ/subclavian thrombi as well.    1) Antimicrobial prophylaxis:  Intermediate risk CMV - Adjust Valcyte for NORMAN to 450 mg daily just TWICE weekly. Last dose on Saturday so will schedule for Tues/Sat.  Intermediate risk Toxo - continue Mepron 1500mg PO daily with food.  PCP - continue Mepron 1500mg PO daily  Thrush - continue Nystatin S/S 5 mL Qid  HCV+ donor/HCV- recipient- follow weekly HCV PCR  check HCV genotype in recipient  Patient will need HCV treatment as an out patient    2) Leukocytosis  - Patient has no signs infection at bedside; no fevers, no chills. No focal complaints, feels well. Note history thrombus, prior episode rejection.  - Monitor for now, low threshold to start antibiotics if any worsening clinical status  - Check blood cultures if any new signs sepsis    Sander Liu MD  Pager [phone]  After 5pm and on weekends call [phone]

## 2018-03-26 NOTE — CHART NOTE - NSCHARTNOTEFT_GEN_A_CORE
Source: Patient [ x ]    Family [ ]     other [ x ]    Pt seen for nutrition follow up. Reports good appetite but minimal PO intake due to food preferences. Pt requesting high potassium foods, reviewed relationship between diet and health/disease and avoiding high potassium foods. Obtained food preferences and assisted with menu selection. Pt states he is drinking 1-2 Ensure Enlive daily, encouraged pt to consume 2-3 8 ounce bottles daily to optimize PO intake due to decreased intake from meals. Pt reports no GI distress, with BM x2 thus far today. Pt requested to defer additional diet instruction at this time.     Chart reviewed- s/p heart transplant on 2/23, post-op course c/b primary graft dysfunction and received plasmapharesis/IVIg x2 with improvement in LV function, bilateral IJ/subclavian thrombi, NORMAN- improving; with increased lymphocytes seen on the fourth biopsy.     Diet : regular, low fiber, no concentrated potassium, Ensure Enlive 8 ounces 3 times/day.    Per d/w NP, unable to liberalize potassium diet restriction as pt with episodes of hyperkalemia.       Admission Weight: 78.9kg  Current Weight: 76.2kg  Weight fluctuations noted, likely due to fluid shifts. Pt with 1+right arm edema.     Pertinent Medications: MEDICATIONS  (STANDING):  ALBUTerol/ipratropium for Nebulization 3 milliLiter(s) Nebulizer every 6 hours  aspirin enteric coated 81 milliGRAM(s) Oral daily  atovaquone Suspension 1500 milliGRAM(s) Oral daily  Calcium Citrate + Vit D, 315 mg/200 Unit 2 Tablet(s) 2 Tablet(s) Oral two times a day  diltiazem    milliGRAM(s) Oral daily  docusate sodium 100 milliGRAM(s) Oral three times a day  famotidine    Tablet 20 milliGRAM(s) Oral daily  insulin lispro (HumaLOG) corrective regimen sliding scale   SubCutaneous three times a day before meals  insulin lispro (HumaLOG) corrective regimen sliding scale   SubCutaneous at bedtime  multivitamin 1 Tablet(s) Oral daily  mycophenolate mofetil 1000 milliGRAM(s) Oral <User Schedule>  nystatin    Suspension 019482 Unit(s) Oral every 6 hours  pravastatin 20 milliGRAM(s) Oral at bedtime  predniSONE   Tablet 35 milliGRAM(s) Oral every 12 hours  silver sulfADIAZINE 1% Cream 1 Application(s) Topical two times a day  sodium bicarbonate 650 milliGRAM(s) Oral three times a day  tacrolimus 9 milliGRAM(s) Oral <User Schedule>  valGANciclovir 450 milliGRAM(s) Oral <User Schedule>    MEDICATIONS  (PRN):  acetaminophen   Tablet. 650 milliGRAM(s) Oral every 6 hours PRN Mild Pain (1 - 3)    Pertinent Labs:    Complete Blood Count (03.26.18 @ 07:15)    Hemoglobin: 9.1 g/dL - low    Hematocrit: 28.6 % - low  Basic Metabolic Panel (03.26.18 @ 07:15)    Sodium, Serum: 142 mmol/L    Potassium, Serum: 4.8 mmol/L    Chloride, Serum: 105 mmol/L    Carbon Dioxide, Serum: 24 mmol/L    Anion Gap, Serum: 13 mmol/L    Blood Urea Nitrogen, Serum: 63 mg/dL - high    Creatinine, Serum: 2.14 mg/dL - high    Glucose, Serum: 118 mg/dL - high    Calcium, Total Serum: 9.1 mg/dL    Point of Care Testing BG: (3/26)212,127, (3/25).    Skin: No pressure ulcers noted.     Estimated Needs:   [ x ] no change since previous assessment  [ ] recalculated:       Previous Nutrition Diagnosis:   Inadequate Oral Intake and Increased Nutrient Needs - addressed with po diet and supplementation  Food & Nutrition Related Knowledge Deficit - Being addressed via continued education and follow-up reinforcement during stay       New Nutrition Diagnosis: [ x ] not applicable    Interventions:   1. Encourage PO intake with all meals.  2. Obtained food preferences, will provide as able.  3. Encourage intake of 2-3 Ensure Enlive daily.  4. Reviewed potassium diet restriction.  5. RD to follow up with further diet instruction as accepted by pt.     D/w NP.        Monitoring and Evaluation:     [ x ] PO intake [ x ] Tolerance to diet prescription [ x ] weights [ x ] follow up per protocol    [ ] other:

## 2018-03-26 NOTE — PROGRESS NOTE ADULT - PROBLEM SELECTOR PLAN 3
Continue atovaquone for PJP and toxoplasmosis prophylaxis  Continue valganciclovir for CMV prophylaxis (intermediate risk).  Continue nystatin for thrush prophylaxis Continue atovaquone for PJP and toxoplasmosis prophylaxis  Continue valganciclovir decrease to very other day  for CMV prophylaxis (intermediate risk).  Continue nystatin for thrush prophylaxis Continue atovaquone for PJP and toxoplasmosis prophylaxis  Continue valganciclovir decrease to every other day  for CMV prophylaxis (intermediate risk).  Continue nystatin for thrush prophylaxis

## 2018-03-26 NOTE — PROGRESS NOTE ADULT - SUBJECTIVE AND OBJECTIVE BOX
CC: F/U leukocytosis    Saw/spoke to patient. Patient feels well. No fevers, no chills. No new complaints, no new pain, no cough, no SOB, no other focal signs infection.    Allergies  No Known Allergies    ANTIMICROBIALS:  atovaquone Suspension 1500 daily  nystatin    Suspension [id number] every 6 hours  valGANciclovir 450 <User Schedule>    PE:    Vital Signs Last 24 Hrs  T(C): 36.3 (26 Mar 2018 11:27), Max: 36.5 (25 Mar 2018 19:29)  T(F): 97.3 (26 Mar 2018 11:27), Max: 97.7 (25 Mar 2018 19:29)  HR: 98 (26 Mar 2018 11:27) (98 - 108)  BP: 137/88 (26 Mar 2018 11:27) (121/86 - 137/88)  BP(mean): 101 (26 Mar 2018 04:29) (101 - 101)  RR: 18 (26 Mar 2018 11:27) (17 - 18)  SpO2: 100% (26 Mar 2018 11:27) (99% - 100%)    Gen: AOx3, NAD, non-toxic, pleasant  CV: S1+S2 normal, no murmurs, nontachycardic; wound vac in place  Resp: Clear bilat, no resp distress, no crackles/wheezes  Abd: Soft, nontender, +BS  Ext: No LE edema, no wounds; digit darkening, bandaged; RUE wrapped for edema    LABS:                        9.1    23.2  )-----------( 547      ( 26 Mar 2018 07:15 )             28.6     03-26    142  |  105  |  63<H>  ----------------------------<  118<H>  4.8   |  24  |  2.14<H>    Ca    9.1      26 Mar 2018 07:15    MICROBIOLOGY:    .Sputum Sputum  03-14-18   Rare Pseudomonas aeruginosa (Carbapenem Resistant)  Normal Respiratory [place] present  --  Pseudomonas aeruginosa (Carbapenem Resistant)    .Sputum Sputum  03-11-18   Few Pseudomonas aeruginosa (Carbapenem Resistant)  Normal Respiratory [place] present  --  Pseudomonas aeruginosa (Carbapenem Resistant)    .Urine Clean Catch (Midstream)  03-10-18   No growth      .Blood Blood  03-09-18   No growth at 5 days.     .Blood Blood  03-09-18   No growth at 5 days.      Skin LVAD driveline site  03-05-18   No growth at 48 hours     (otherwise reviewed)    RADIOLOGY:

## 2018-03-26 NOTE — PROGRESS NOTE ADULT - PROBLEM SELECTOR PLAN 2
Continue prednisone taper. He will receive 35  mg PO  Continue CellCept 1000 mg BID  Tacrolimus level 12 today from 10   Tacrolimus 9 mg PO BID  Diltiazem  po daily   We will continue on aspirin and Pravachol. Continue prednisone taper. He will receive 35  mg PO  Continue CellCept 1000 mg BID  Tacrolimus level 12 today from 10    Decrease Tacrolimus  to 8  mg PO BID  Diltiazem  po daily   We will continue on aspirin and Pravachol.  Echo today   Cardiac biopsy plan for Wednesday Continue prednisone taper. He will receive 30 q12 starting tomorrow  Continue CellCept 1000 mg BID  Tacrolimus level 12 today from 10; given increase dilt dose, reduce Tacrolimus to 8  mg PO BID  Diltiazem  po daily   We will continue on aspirin and Pravachol.  Cardiac biopsy plan for Wednesday

## 2018-03-26 NOTE — PROGRESS NOTE ADULT - SUBJECTIVE AND OBJECTIVE BOX
[facility] DIVISION OF KIDNEY DISEASES AND HYPERTENSION -- FOLLOW UP NOTE  --------------------------------------------------------------------------------  Chief Complaint:  NORMAN    24 hour events/subjective:  improved UO      PAST HISTORY  --------------------------------------------------------------------------------  No significant changes to PMH, PSH, FHx, SHx, unless otherwise noted    ALLERGIES & MEDICATIONS  --------------------------------------------------------------------------------  Allergies    No Known Allergies    Intolerances      Standing Inpatient Medications  ALBUTerol/ipratropium for Nebulization 3 milliLiter(s) Nebulizer every 6 hours  aspirin enteric coated 81 milliGRAM(s) Oral daily  atovaquone Suspension 1500 milliGRAM(s) Oral daily  Calcium Citrate + Vit D, 315 mg/200 Unit 2 Tablet(s) 2 Tablet(s) Oral two times a day  diltiazem    milliGRAM(s) Oral daily  docusate sodium 100 milliGRAM(s) Oral three times a day  famotidine    Tablet 20 milliGRAM(s) Oral daily  heparin  Injectable 99956 Unit(s) SubCutaneous every 12 hours  insulin lispro (HumaLOG) corrective regimen sliding scale   SubCutaneous three times a day before meals  insulin lispro (HumaLOG) corrective regimen sliding scale   SubCutaneous at bedtime  multivitamin 1 Tablet(s) Oral daily  mycophenolate mofetil 1000 milliGRAM(s) Oral <User Schedule>  nystatin    Suspension 840246 Unit(s) Oral every 6 hours  pravastatin 20 milliGRAM(s) Oral at bedtime  predniSONE   Tablet 35 milliGRAM(s) Oral every 12 hours  silver sulfADIAZINE 1% Cream 1 Application(s) Topical two times a day  sodium bicarbonate 650 milliGRAM(s) Oral three times a day  tacrolimus 9 milliGRAM(s) Oral <User Schedule>  valGANciclovir 450 milliGRAM(s) Oral <User Schedule>    PRN Inpatient Medications  acetaminophen   Tablet. 650 milliGRAM(s) Oral every 6 hours PRN      REVIEW OF SYSTEMS  --------------------------------------------------------------------------------  Gen: No weight changes, fatigue, fevers/chills, weakness  Skin: No rashes  Head/Eyes/Ears/Mouth: No headache; Normal hearing; Normal vision w/o blurriness; No sinus pain/discomfort, sore throat  Respiratory: No dyspnea, cough, wheezing, hemoptysis  CV: No chest pain, PND, orthopnea  GI: No abdominal pain, diarrhea, constipation, nausea, vomiting, melena, hematochezia  : No increased frequency, dysuria, hematuria, nocturia  MSK: No joint pain/swelling; no back pain; no edema  Neuro: No dizziness/lightheadedness, weakness, seizures, numbness, tingling  Heme: No easy bruising or bleeding  Endo: No heat/cold intolerance  Psych: No significant nervousness, anxiety, stress, depression    All other systems were reviewed and are negative, except as noted.    VITALS/PHYSICAL EXAM  --------------------------------------------------------------------------------  T(C): 36.3 (03-26-18 @ 11:27), Max: 36.6 (03-25-18 @ 15:32)  HR: 98 (03-26-18 @ 11:27) (98 - 108)  BP: 137/88 (03-26-18 @ 11:27) (120/78 - 137/88)  RR: 18 (03-26-18 @ 11:27) (16 - 18)  SpO2: 100% (03-26-18 @ 11:27) (96% - 100%)  Wt(kg): --        03-25-18 @ 07:01  -  03-26-18 @ 07:00  --------------------------------------------------------  IN: 1070 mL / OUT: 1000 mL / NET: 70 mL    03-26-18 @ 07:01  -  03-26-18 @ 11:46  --------------------------------------------------------  IN: 360 mL / OUT: 350 mL / NET: 10 mL      Physical Exam:  	Gen: NAD, well-appearing  	HEENT: PERRL, supple neck, clear oropharynx  	Pulm: CTA B/L  	CV: RRR, S1S2; no rub  	Back: No spinal or CVA tenderness; no sacral edema  	Abd: +BS, soft, nontender/nondistended  	: No suprapubic tenderness  	UE: Warm, FROM, no clubbing, intact strength; no edema; no asterixis  	LE: Warm, FROM, no clubbing, intact strength; no edema  	Neuro: No focal deficits, intact gait      LABS/STUDIES  --------------------------------------------------------------------------------              9.1    23.2  >-----------<  547      [03-26-18 @ 07:15]              28.6     142  |  105  |  63  ----------------------------<  118      [03-26-18 @ 07:15]  4.8   |  24  |  2.14        Ca     9.1     [03-26-18 @ 07:15]      PT/INR: PT 11.9 , INR 1.10       [03-26-18 @ 07:15]  PTT: 41.8       [03-26-18 @ 07:15]      Creatinine Trend:  SCr 2.14 [03-26 @ 07:15]  SCr 2.23 [03-25 @ 07:32]  SCr 2.39 [03-24 @ 07:12]  SCr 3.03 [03-23 @ 07:20]  SCr 3.06 [03-22 @ 07:09]    Urinalysis - [03-23-18 @ 16:03]      Color Yellow / Appearance Slightly Turbid / SG 1.020 / pH 5.0      Gluc Negative / Ketone Negative  / Bili Negative / Urobili Negative       Blood Small / Protein 30 / Leuk Est Small / Nitrite Negative      RBC 5 / WBC 8 / Hyaline 0 / Gran  / Sq Epi  / Non Sq Epi 4 / Bacteria Few    Urine Creatinine 101      [03-19-18 @ 20:01]  Urine Protein 89      [03-19-18 @ 20:01]    Iron 22, TIBC 182, %sat 12      [02-13-18 @ 12:44]  Ferritin 79.0      [02-13-18 @ 12:44]  HbA1c 5.3      [03-18-18 @ 11:20]  TSH 1.48      [02-27-18 @ 08:13]  Lipid: chol 62, TG 33, HDL 17, LDL 38      [06-07-17 @ 06:14]      C3 Complement 135      [03-19-18 @ 15:13]  C4 Complement 37      [03-19-18 @ 15:13]  Free Light Chains: kappa 4.36, lambda 5.71, ratio = 0.76      [03-19 @ 15:13]  Immunofixation Serum:   No Monoclonal Band Identified      [03-19-18 @ 15:13]  Cryoglobulin: Negative      [03-19-18 @ 15:13]

## 2018-03-26 NOTE — PROGRESS NOTE ADULT - PROBLEM SELECTOR PLAN 1
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  Tacro increased 3/21 and will monitor crt given recent increase due to worsening cardiac rejection. Crt trending down now and urinalysis sediment on my view last evening showed no casts at all, no granular casts suggesting a pre renal insult likely from vasoconstriction from tacro.   Monitor renal function as you increase tacro  Dose abx now as Crt is improving  Hyperkalemia resolved, cont bicarb till crt normalizes  Can dose valcyte as renal function improves to more frequent  Ok to start bactrim if need be in next 24 hours  Can change to lovenox as well if need be once crt <1.5 Tacro levels are fluctuating and will monitor for now and if possible to keep 10-12 range Vanco induced cast nephropathy if levels are high in such cases.  Tacro increased 3/21 and will monitor crt given recent increase due to worsening cardiac rejection. Crt trending down now    Monitor renal function as you increase tacro  Dose abx now as Crt is improving  Hyperkalemia resolved, cont bicarb till crt normalizes  Can dose valcyte as renal function improves to more frequent  Ok to start bactrim if need be in next 24 hours, would do MWF dosing if needed  Can change to lovenox as well if need be once crt <1.5

## 2018-03-26 NOTE — PROGRESS NOTE ADULT - ASSESSMENT
Mr. Baez is a 67 year old man with ACC/AHA stage D chronic systolic heart failure due to NICM s/p HM2 LVAD 6/17 c/b pump thrombosis now s/p heart transplant on 2/23 (CMV D-/R+ and Toxo D-/R+), with post-op course c/b primary graft dysfunction, initially thought to be immune-mediated due to positive B-cell flow (negative CDC cross match) and received plasmapharesis/IVIg x2 with improvement in LV function. His course has been complicated by bilateral IJ/subclavian thrombi. He currently has acute renal failure of unclear etiology. However, he does not have clear evidence of allograft dysfunction or low output on exam. 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     # Immunosuppression:  Tacrolimus -  Will continue dosing at 9 mg BID (increased yesterday). Tacro level 7 this morning from 9.4 Target trough level of 12-14. - - cardizem  240 qd  CellCept - continue 1000 mg PO BID.   Steroids - will give short pulse of oral steroids. Will give 50 mg PO BID for three days with subsequent taper.  Taper starting 3/24 45 mg x2 the 40 x2  decrease by 10  until 15 mg bid- ordered    #Hypertension  - Increase  cardizem 240 mg daily.   	  # Antimicrobial prophylaxis:  Intermediate risk CMV - Valcyte for NORMAN to 450 mg daily just TWICE weekly.   Intermediate risk Toxo - continue Mepron 1500mg PO daily with food.  PCP - continue Mepron 1500mg PO daily  Thrush - continue Nystatin S/S 5 mL QID    # Bilateral IJ/Subclavian thrombi - unchanged on f/u U/S 3/12  -  heparin 13,000 units subcutaneous Q12 (250 units/kg Q12) per vasc cards recs in setting of renal function.  PTT q 6 hrs after administration dosed by Dr Tavarez  - CHERIE negative  Plan for Venous duplex UE on monday per Dr Tavarez    #Acute renal failure, possibly related to vancomycin dosing, improving.   - Vanco stopped.   - He responded to diuretics given on Friday. No further diuresis.     # CAV PPx  - Continue ECASA 81mg PO daily  - Continue pravastatin 20mg PO QHS    # ID   - Pseudomonas in sputum - course of cefepime completed 3/15  - DLES improved and vancomycin stopped given high trough with NORMAN. Per ID, will redose with daptomycin depending upon repeat vanc level this morning.   - silver sulfadizaine dressings per plastics for R 3rd digit.   - HCV viral load and PCR per protocol. Therapy to be initiated as outpatient by Dr. Thomas (Hepatology)  - Transplant ID following    #endo  - BG currently well controlled, ranging 103-157  - SSI AC & HS    # Additional prevention:  - Citracal + D 2 tabs PO BID  - multivitamin 1 tab daily  - Stress ulcer ppx while on steroids: will change pantoprazole to ranitidine per renal recommendations.  - Mag oxide discontinued currently. Mag 1.9  this AM.    #dispo  - Ongoing work with PT for ongoing exercise tolerance prior to d/c    plan d/c home early next week.    3/26 Pt for repeat UE thrombus as per HF, r/o propagation  Creat is improving may need medications adjustment  Ambulate  Biopsy tomorrow, hold heparin tonight

## 2018-03-26 NOTE — PROGRESS NOTE ADULT - PROBLEM SELECTOR PLAN 1
EMB #1: 1R/1A, pAMR 1. DSA negative.  EMB #2: 1R/1A, pAMR 1. DSA negative.  EMB #3: 1R/1A, pAMR1. No DSA. RA 12, PA 40/16/27, PCW 11, PA 67%, CO/CI 6.8/3.6. TTE with mild RV enlargement and dysfunction.  EMB #4: 1R/2, pAMR1. RA 10, PA 39/18, Wedge 18, CO/CI 6/3.2.         The significance of the pAMR is unclear as he has had C4d deposition since the time of transplant. He has low level DSA also of unclear significance. We will follow closely. We will resend DSA on 3/26. EMB #1: 1R/1A, pAMR 1. DSA negative.  EMB #2: 1R/1A, pAMR 1. DSA negative.  EMB #3: 1R/1A, pAMR1. No DSA. RA 12, PA 40/16/27, PCW 11, PA 67%, CO/CI 6.8/3.6. TTE with mild RV enlargement and dysfunction.  EMB #4: 1R/2, pAMR1. RA 10, PA 39/18, Wedge 18, CO/CI 6/3.2  The significance of the pAMR is unclear as he has had C4d deposition since the time of transplant. He has low level DSA also of unclear significance. We will follow closely. We will resend DSA on 3/28 at time of next biopsy.

## 2018-03-26 NOTE — PROGRESS NOTE ADULT - SUBJECTIVE AND OBJECTIVE BOX
hello im ok    VITAL SIGNS    Telemetry:   nsr 90    Vital Signs Last 24 Hrs  T(C): 36.3 (18 @ 11:27), Max: 36.6 (18 @ 15:32)  T(F): 97.3 (18 @ 11:27), Max: 97.9 (18 @ 15:32)  HR: 98 (18 @ 11:27) (98 - 108)  BP: 137/88 (18 @ 11:27) (120/78 - 137/88)  RR: 18 (18 @ 11:27) (17 - 18)  SpO2: 100% (18 @ 11:27) (98% - 100%)                    @ 07:01  -   @ 07:00  --------------------------------------------------------  IN: 1070 mL / OUT: 1000 mL / NET: 70 mL     @ 07:01  -   @ 12:22  --------------------------------------------------------  IN: 360 mL / OUT: 350 mL / NET: 10 mL          Daily     Daily Weight in k.2 (26 Mar 2018 07:25)        CAPILLARY BLOOD GLUCOSE      POCT Blood Glucose.: 212 mg/dL (26 Mar 2018 11:33)  POCT Blood Glucose.: 127 mg/dL (26 Mar 2018 06:56)  POCT Blood Glucose.: 144 mg/dL (25 Mar 2018 22:05)  POCT Blood Glucose.: 97 mg/dL (25 Mar 2018 19:57)                  Coumadin    [ ] YES          [ x ]      NO         Reason:     sq heparin, ue dvt                           PHYSICAL EXAM        Neurology: alert and oriented x 3, nonfocal, no gross deficits  CV : S1 S2 , RRR   Sternal Wound :  CDI , Stable  Lungs: cta  Abdomen: soft, nontender, nondistended, positive bowel sounds, last bowel movement +, driveltne vac in placve  :         voiding    Extremities:       - edema - calve tenderness        acetaminophen   Tablet. 650 milliGRAM(s) Oral every 6 hours PRN  ALBUTerol/ipratropium for Nebulization 3 milliLiter(s) Nebulizer every 6 hours  aspirin enteric coated 81 milliGRAM(s) Oral daily  atovaquone Suspension 1500 milliGRAM(s) Oral daily  Calcium Citrate + Vit D, 315 mg/200 Unit 2 Tablet(s) 2 Tablet(s) Oral two times a day  diltiazem    milliGRAM(s) Oral daily  docusate sodium 100 milliGRAM(s) Oral three times a day  famotidine    Tablet 20 milliGRAM(s) Oral daily  heparin  Injectable 04633 Unit(s) SubCutaneous every 12 hours  insulin lispro (HumaLOG) corrective regimen sliding scale   SubCutaneous three times a day before meals  insulin lispro (HumaLOG) corrective regimen sliding scale   SubCutaneous at bedtime  multivitamin 1 Tablet(s) Oral daily  mycophenolate mofetil 1000 milliGRAM(s) Oral <User Schedule>  nystatin    Suspension 794145 Unit(s) Oral every 6 hours  pravastatin 20 milliGRAM(s) Oral at bedtime  predniSONE   Tablet 35 milliGRAM(s) Oral every 12 hours  silver sulfADIAZINE 1% Cream 1 Application(s) Topical two times a day  sodium bicarbonate 650 milliGRAM(s) Oral three times a day  tacrolimus 9 milliGRAM(s) Oral <User Schedule>  valGANciclovir 450 milliGRAM(s) Oral <User Schedule>                    Physical Therapy Rec:   Home  [  ]   Home w/ PT  [  ]  Rehab  [  ]  Discussed with Cardiothoracic Team at AM rounds.

## 2018-03-26 NOTE — PROGRESS NOTE ADULT - ASSESSMENT
Mr. Baez is a 67 year old man with ACC/AHA stage D chronic systolic heart failure due to NICM s/p HM2 LVAD 6/17 c/b pump thrombosis now s/p heart transplant on 2/23 (CMV D-/R+ and Toxo D-/R+), with post-op course c/b primary graft dysfunction, initially thought to be immune-mediated due to positive B-cell flow (negative CDC cross match) and received plasmapharesis/IVIg x2 with improvement in LV function. His course has been complicated by bilateral IJ/subclavian thrombi and pseudomonas infection, which was treated. He currently has acute renal failure of unclear etiology, which is improving. His biopsy is consistent with mild worsening in cellular rejection. However, he does not have clear evidence of allograft dysfunction or low output on exam.    Plan discussed with 2 [facility] NP team and in multidisciplinary rounds. Mr. Baez is a 67 year old man with ACC/AHA stage D chronic systolic heart failure due to NICM s/p HM2 LVAD 6/17 c/b pump thrombosis now s/p heart transplant on 2/23 (CMV D-/R+ and Toxo D-/R+), with post-op course c/b primary graft dysfunction, initially thought to be immune-mediated due to positive B-cell flow (negative CDC cross match) and received plasmapharesis/IVIg x2 with improvement in LV function. His course has been complicated by bilateral IJ/subclavian thrombi and pseudomonas infection, which was treated. He currently has acute renal failure of unclear etiology, which is improving. His biopsy is consistent with mild worsening in cellular rejection. However, he does not have clear evidence of allograft dysfunction or low output on exam.    Pt presenting today feels well ambulating in halls with PT I mproved Tacro level, today , appears euvolemic but  new murmer

## 2018-03-27 LAB
ALBUMIN SERPL ELPH-MCNC: 3.3 G/DL — SIGNIFICANT CHANGE UP (ref 3.3–5)
ALP SERPL-CCNC: 92 U/L — SIGNIFICANT CHANGE UP (ref 40–120)
ALT FLD-CCNC: 32 U/L RC — SIGNIFICANT CHANGE UP (ref 10–45)
ANION GAP SERPL CALC-SCNC: 12 MMOL/L — SIGNIFICANT CHANGE UP (ref 5–17)
APTT BLD: 66.4 SEC — HIGH (ref 27.5–37.4)
APTT BLD: 76.8 SEC — HIGH (ref 27.5–37.4)
AST SERPL-CCNC: 20 U/L — SIGNIFICANT CHANGE UP (ref 10–40)
BILIRUB SERPL-MCNC: 0.5 MG/DL — SIGNIFICANT CHANGE UP (ref 0.2–1.2)
BUN SERPL-MCNC: 57 MG/DL — HIGH (ref 7–23)
CALCIUM SERPL-MCNC: 9.1 MG/DL — SIGNIFICANT CHANGE UP (ref 8.4–10.5)
CHLORIDE SERPL-SCNC: 107 MMOL/L — SIGNIFICANT CHANGE UP (ref 96–108)
CO2 SERPL-SCNC: 25 MMOL/L — SIGNIFICANT CHANGE UP (ref 22–31)
CREAT SERPL-MCNC: 1.82 MG/DL — HIGH (ref 0.5–1.3)
GLUCOSE BLDC GLUCOMTR-MCNC: 110 MG/DL — HIGH (ref 70–99)
GLUCOSE BLDC GLUCOMTR-MCNC: 116 MG/DL — HIGH (ref 70–99)
GLUCOSE BLDC GLUCOMTR-MCNC: 135 MG/DL — HIGH (ref 70–99)
GLUCOSE BLDC GLUCOMTR-MCNC: 147 MG/DL — HIGH (ref 70–99)
GLUCOSE SERPL-MCNC: 124 MG/DL — HIGH (ref 70–99)
HCT VFR BLD CALC: 27.6 % — LOW (ref 39–50)
HGB BLD-MCNC: 8.3 G/DL — LOW (ref 13–17)
INR BLD: 1.16 RATIO — SIGNIFICANT CHANGE UP (ref 0.88–1.16)
MAGNESIUM SERPL-MCNC: 1.6 MG/DL — SIGNIFICANT CHANGE UP (ref 1.6–2.6)
MCHC RBC-ENTMCNC: 28.1 PG — SIGNIFICANT CHANGE UP (ref 27–34)
MCHC RBC-ENTMCNC: 30.3 GM/DL — LOW (ref 32–36)
MCV RBC AUTO: 92.9 FL — SIGNIFICANT CHANGE UP (ref 80–100)
PLATELET # BLD AUTO: 472 K/UL — HIGH (ref 150–400)
POTASSIUM SERPL-MCNC: 4.8 MMOL/L — SIGNIFICANT CHANGE UP (ref 3.5–5.3)
POTASSIUM SERPL-SCNC: 4.8 MMOL/L — SIGNIFICANT CHANGE UP (ref 3.5–5.3)
PROT SERPL-MCNC: 6.3 G/DL — SIGNIFICANT CHANGE UP (ref 6–8.3)
PROTHROM AB SERPL-ACNC: 12.7 SEC — SIGNIFICANT CHANGE UP (ref 9.8–12.7)
RBC # BLD: 2.97 M/UL — LOW (ref 4.2–5.8)
RBC # FLD: 18.1 % — HIGH (ref 10.3–14.5)
SODIUM SERPL-SCNC: 144 MMOL/L — SIGNIFICANT CHANGE UP (ref 135–145)
TACROLIMUS SERPL-MCNC: 9.7 NG/ML — SIGNIFICANT CHANGE UP
WBC # BLD: 20.6 K/UL — HIGH (ref 3.8–10.5)
WBC # FLD AUTO: 20.6 K/UL — HIGH (ref 3.8–10.5)

## 2018-03-27 PROCEDURE — 90837 PSYTX W PT 60 MINUTES: CPT

## 2018-03-27 PROCEDURE — 71045 X-RAY EXAM CHEST 1 VIEW: CPT | Mod: 26

## 2018-03-27 PROCEDURE — 99233 SBSQ HOSP IP/OBS HIGH 50: CPT

## 2018-03-27 PROCEDURE — 99232 SBSQ HOSP IP/OBS MODERATE 35: CPT

## 2018-03-27 RX ORDER — TACROLIMUS 5 MG/1
9 CAPSULE ORAL
Qty: 0 | Refills: 0 | Status: DISCONTINUED | OUTPATIENT
Start: 2018-03-27 | End: 2018-03-28

## 2018-03-27 RX ORDER — TACROLIMUS 5 MG/1
1 CAPSULE ORAL ONCE
Qty: 0 | Refills: 0 | Status: COMPLETED | OUTPATIENT
Start: 2018-03-27 | End: 2018-03-27

## 2018-03-27 RX ORDER — MAGNESIUM OXIDE 400 MG ORAL TABLET 241.3 MG
400 TABLET ORAL
Qty: 0 | Refills: 0 | Status: DISCONTINUED | OUTPATIENT
Start: 2018-03-27 | End: 2018-04-01

## 2018-03-27 RX ORDER — VALGANCICLOVIR 450 MG/1
450 TABLET, FILM COATED ORAL
Qty: 0 | Refills: 0 | Status: DISCONTINUED | OUTPATIENT
Start: 2018-03-27 | End: 2018-03-28

## 2018-03-27 RX ORDER — VALGANCICLOVIR 450 MG/1
450 TABLET, FILM COATED ORAL
Qty: 0 | Refills: 0 | Status: DISCONTINUED | OUTPATIENT
Start: 2018-03-27 | End: 2018-03-27

## 2018-03-27 RX ADMIN — Medication 500000 UNIT(S): at 17:50

## 2018-03-27 RX ADMIN — Medication 500000 UNIT(S): at 23:57

## 2018-03-27 RX ADMIN — MYCOPHENOLATE MOFETIL 1000 MILLIGRAM(S): 250 CAPSULE ORAL at 08:04

## 2018-03-27 RX ADMIN — Medication 500000 UNIT(S): at 06:26

## 2018-03-27 RX ADMIN — HEPARIN SODIUM 14000 UNIT(S): 5000 INJECTION INTRAVENOUS; SUBCUTANEOUS at 11:49

## 2018-03-27 RX ADMIN — Medication 650 MILLIGRAM(S): at 22:42

## 2018-03-27 RX ADMIN — ATOVAQUONE 1500 MILLIGRAM(S): 750 SUSPENSION ORAL at 11:49

## 2018-03-27 RX ADMIN — TACROLIMUS 1 MILLIGRAM(S): 5 CAPSULE ORAL at 15:02

## 2018-03-27 RX ADMIN — Medication 81 MILLIGRAM(S): at 11:48

## 2018-03-27 RX ADMIN — Medication 100 MILLIGRAM(S): at 15:02

## 2018-03-27 RX ADMIN — Medication 30 MILLIGRAM(S): at 17:50

## 2018-03-27 RX ADMIN — Medication 500000 UNIT(S): at 11:49

## 2018-03-27 RX ADMIN — Medication 1 APPLICATION(S): at 17:50

## 2018-03-27 RX ADMIN — MYCOPHENOLATE MOFETIL 1000 MILLIGRAM(S): 250 CAPSULE ORAL at 20:00

## 2018-03-27 RX ADMIN — Medication 650 MILLIGRAM(S): at 15:02

## 2018-03-27 RX ADMIN — Medication 20 MILLIGRAM(S): at 22:42

## 2018-03-27 RX ADMIN — Medication 240 MILLIGRAM(S): at 06:25

## 2018-03-27 RX ADMIN — Medication 3 MILLILITER(S): at 23:56

## 2018-03-27 RX ADMIN — Medication 35 MILLIGRAM(S): at 06:26

## 2018-03-27 RX ADMIN — VALGANCICLOVIR 450 MILLIGRAM(S): 450 TABLET, FILM COATED ORAL at 06:25

## 2018-03-27 RX ADMIN — Medication 1 TABLET(S): at 11:49

## 2018-03-27 RX ADMIN — FAMOTIDINE 20 MILLIGRAM(S): 10 INJECTION INTRAVENOUS at 11:48

## 2018-03-27 RX ADMIN — Medication 1 APPLICATION(S): at 06:33

## 2018-03-27 RX ADMIN — TACROLIMUS 9 MILLIGRAM(S): 5 CAPSULE ORAL at 20:00

## 2018-03-27 RX ADMIN — TACROLIMUS 8 MILLIGRAM(S): 5 CAPSULE ORAL at 08:04

## 2018-03-27 RX ADMIN — Medication 3 MILLILITER(S): at 06:26

## 2018-03-27 RX ADMIN — Medication 3 MILLILITER(S): at 11:49

## 2018-03-27 RX ADMIN — Medication 650 MILLIGRAM(S): at 06:25

## 2018-03-27 RX ADMIN — Medication 3 MILLILITER(S): at 17:50

## 2018-03-27 RX ADMIN — MAGNESIUM OXIDE 400 MG ORAL TABLET 400 MILLIGRAM(S): 241.3 TABLET ORAL at 20:00

## 2018-03-27 NOTE — PROGRESS NOTE ADULT - ASSESSMENT
Mr. Baez is a 67 year old man with ACC/AHA stage D chronic systolic heart failure due to NICM s/p HM2 LVAD 6/17 c/b pump thrombosis now s/p heart transplant on 2/23 (CMV D-/R+ and Toxo D-/R+), with post-op course c/b primary graft dysfunction, initially thought to be immune-mediated due to positive B-cell flow (negative CDC cross match) and received plasmapharesis/IVIg x2 with improvement in LV function. His course has been complicated by bilateral IJ/subclavian thrombi. He currently has acute renal failure of unclear etiology. However, he does not have clear evidence of allograft dysfunction or low output on exam. 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     # Immunosuppression:  Tacrolimus -  Will continue dosing at 9 mg BID (increased yesterday). Tacro level 7 this morning from 9.4 Target trough level of 12-14. - - cardizem  240 qd  CellCept - continue 1000 mg PO BID.   Steroids - will give short pulse of oral steroids. Will give 50 mg PO BID for three days with subsequent taper.  Taper starting 3/24 45 mg x2 the 40 x2  decrease by 10  until 15 mg bid- ordered    #Hypertension  - Increase  cardizem 240 mg daily.   	  # Antimicrobial prophylaxis:  Intermediate risk CMV - Valcyte for NORMAN to 450 mg daily just TWICE weekly.   Intermediate risk Toxo - continue Mepron 1500mg PO daily with food.  PCP - continue Mepron 1500mg PO daily  Thrush - continue Nystatin S/S 5 mL QID    # Bilateral IJ/Subclavian thrombi - unchanged on f/u U/S 3/12  -  heparin 13,000 units subcutaneous Q12 (250 units/kg Q12) per vasc cards recs in setting of renal function.  PTT q 6 hrs after administration dosed by Dr Tavarez  - CHERIE negative  Plan for Venous duplex UE on monday per Dr Tavarez    #Acute renal failure, possibly related to vancomycin dosing, improving.   - Vanco stopped.   - He responded to diuretics given on Friday. No further diuresis.     # CAV PPx  - Continue ECASA 81mg PO daily  - Continue pravastatin 20mg PO QHS    # ID   - Pseudomonas in sputum - course of cefepime completed 3/15  - DLES improved and vancomycin stopped given high trough with NORMAN. Per ID, will redose with daptomycin depending upon repeat vanc level this morning.   - silver sulfadizaine dressings per plastics for R 3rd digit.   - HCV viral load and PCR per protocol. Therapy to be initiated as outpatient by Dr. Thomas (Hepatology)  - Transplant ID following    #endo  - BG currently well controlled, ranging 103-157  - SSI AC & HS    # Additional prevention:  - Citracal + D 2 tabs PO BID  - multivitamin 1 tab daily  - Stress ulcer ppx while on steroids: will change pantoprazole to ranitidine per renal recommendations.  - Mag oxide discontinued currently. Mag 1.9  this AM.    #dispo  - Ongoing work with PT for ongoing exercise tolerance prior to d/c    plan d/c home early next week.    3/26 Pt for repeat UE thrombus as per HF, r/o propagation  Creat is improving may need medications adjustment  Ambulate  Biopsy tomorrow, hold heparin tonight    3/27 Biopsy now scheduled for wednesday.    Echo :< from: Transthoracic Echocardiogram (03.26.18 @ 20:14) >  . Normal mitral valve. Minimal mitral regurgitation.  2. Normal trileaflet aortic valve. No aortic valve  regurgitation seen.  3. Left atrial enlargement status-post cardiac transplant.  Mobile interatrial septum.  4. Normal left ventricular systolic function. Paradoxical  septal motion consistent with prior open-heart surgery.  5. Normal right ventricular size with mildly decreased  right ventricular systolic function. TAPSE=1.1 cm.  6. Estimated right ventricular systolic pressure equals 49  mm Hg, assuming right atrial pressure equals > 15 mmHg,  consistent with mild pulmonary hypertension.  7. No pericardial effusion.    < end of copied text >    UE duplex with new innominate vein partial thrombus, cont anticoagulation, now therapeutic. D/w Dr Tavarez  NPO p Mn for biopsy, hold heparin tonight.  Prograf level down , to d/w HF  D/c planning

## 2018-03-27 NOTE — PROGRESS NOTE ADULT - SUBJECTIVE AND OBJECTIVE BOX
Subjective:    Medications:  acetaminophen   Tablet. 650 milliGRAM(s) Oral every 6 hours PRN  ALBUTerol/ipratropium for Nebulization 3 milliLiter(s) Nebulizer every 6 hours  aspirin enteric coated 81 milliGRAM(s) Oral daily  atovaquone Suspension 1500 milliGRAM(s) Oral daily  Calcium Citrate + Vit D, 315 mg/200 Unit 2 Tablet(s) 2 Tablet(s) Oral two times a day  diltiazem    milliGRAM(s) Oral daily  docusate sodium 100 milliGRAM(s) Oral three times a day  famotidine    Tablet 20 milliGRAM(s) Oral daily  heparin  Injectable 41878 Unit(s) SubCutaneous every 12 hours  insulin lispro (HumaLOG) corrective regimen sliding scale   SubCutaneous three times a day before meals  insulin lispro (HumaLOG) corrective regimen sliding scale   SubCutaneous at bedtime  multivitamin 1 Tablet(s) Oral daily  mycophenolate mofetil 1000 milliGRAM(s) Oral <User Schedule>  nystatin    Suspension 228990 Unit(s) Oral every 6 hours  pravastatin 20 milliGRAM(s) Oral at bedtime  predniSONE   Tablet 30 milliGRAM(s) Oral every 12 hours  silver sulfADIAZINE 1% Cream 1 Application(s) Topical two times a day  sodium bicarbonate 650 milliGRAM(s) Oral three times a day  tacrolimus 8 milliGRAM(s) Oral <User Schedule>  valGANciclovir 450 milliGRAM(s) Oral <User Schedule>      PHYSICAL EXAM:  Vital Signs Last 24 Hrs  T(C): 36.6 (27 Mar 2018 07:39), Max: 36.6 (26 Mar 2018 23:16)  T(F): 97.9 (27 Mar 2018 07:39), Max: 97.9 (26 Mar 2018 23:16)  HR: 107 (27 Mar 2018 07:39) (96 - 107)  BP: 127/83 (27 Mar 2018 07:39) (127/83 - 139/85)  BP(mean): 109 (27 Mar 2018 03:22) (96 - 109)  RR: 18 (27 Mar 2018 07:39) (17 - 18)  SpO2: 100% (27 Mar 2018 07:39) (97% - 100%)    Daily Weight in k.2 (27 Mar 2018 06:33)  I&O's Detail    26 Mar 2018 07:01  -  27 Mar 2018 07:00  --------------------------------------------------------  IN:    Oral Fluid: 620 mL  Total IN: 620 mL    OUT:    Voided: 1550 mL  Total OUT: 1550 mL    Total NET: -930 mL          General: A/ox 3, No acute Distress  Neck: Supple, NO JVD  Cardiac: S1 S2, No M/R/G  Pulmonary: CTAB, Breathing unlabored, No Rhonchi/Rales/Wheezing  Abdomen: Soft, Non -tender, +BS   Extremities: No Rashes, No edema  Neuro: A/o x 3, No focal deficits  Psch: normal mood , normal affect    LABS:                          9.1    23.2  )-----------( 547      ( 26 Mar 2018 07:15 )             28.6     03-    142  |  105  |  63<H>  ----------------------------<  118<H>  4.8   |  24  |  2.14<H>    Creatinine, Serum: 2.23 mg/dL (18 @ 07:32)        Ca    9.1      26 Mar 2018 07:15      PT/INR - ( 27 Mar 2018 07:19 )   PT: 12.7 sec;   INR: 1.16 ratio         PTT - ( 27 Mar 2018 07:19 )  PTT:76.8 sec      Tacrolimus (), Serum: 12.9:  (18 @ 09:04) Subjective:    Medications:  acetaminophen   Tablet. 650 milliGRAM(s) Oral every 6 hours PRN  ALBUTerol/ipratropium for Nebulization 3 milliLiter(s) Nebulizer every 6 hours  aspirin enteric coated 81 milliGRAM(s) Oral daily  atovaquone Suspension 1500 milliGRAM(s) Oral daily  Calcium Citrate + Vit D, 315 mg/200 Unit 2 Tablet(s) 2 Tablet(s) Oral two times a day  diltiazem    milliGRAM(s) Oral daily  docusate sodium 100 milliGRAM(s) Oral three times a day  famotidine    Tablet 20 milliGRAM(s) Oral daily  heparin  Injectable 99902 Unit(s) SubCutaneous every 12 hours  insulin lispro (HumaLOG) corrective regimen sliding scale   SubCutaneous three times a day before meals  insulin lispro (HumaLOG) corrective regimen sliding scale   SubCutaneous at bedtime  multivitamin 1 Tablet(s) Oral daily  mycophenolate mofetil 1000 milliGRAM(s) Oral <User Schedule>  nystatin    Suspension 298135 Unit(s) Oral every 6 hours  pravastatin 20 milliGRAM(s) Oral at bedtime  predniSONE   Tablet 30 milliGRAM(s) Oral every 12 hours  silver sulfADIAZINE 1% Cream 1 Application(s) Topical two times a day  sodium bicarbonate 650 milliGRAM(s) Oral three times a day  tacrolimus 8 milliGRAM(s) Oral <User Schedule>  valGANciclovir 450 milliGRAM(s) Oral <User Schedule>      PHYSICAL EXAM:  Vital Signs Last 24 Hrs  T(C): 36.6 (27 Mar 2018 07:39), Max: 36.6 (26 Mar 2018 23:16)  T(F): 97.9 (27 Mar 2018 07:39), Max: 97.9 (26 Mar 2018 23:16)  HR: 107 (27 Mar 2018 07:39) (96 - 107)  BP: 127/83 (27 Mar 2018 07:39) (127/83 - 139/85)  BP(mean): 109 (27 Mar 2018 03:22) (96 - 109)  RR: 18 (27 Mar 2018 07:39) (17 - 18)  SpO2: 100% (27 Mar 2018 07:39) (97% - 100%)  Daily Weight in k.2 (27 Mar 2018 06:33)  I&O's Detail    26 Mar 2018 07:01  -  27 Mar 2018 07:00  --------------------------------------------------------  IN:    Oral Fluid: 620 mL  Total IN: 620 mL    OUT:    Voided: 1550 mL  Total OUT: 1550 mL    Total NET: -930 mL          General: A/ox 3, No acute Distress  Neck: Supple, NO JVD  Cardiac: S1 S2, No M/R/G  Pulmonary: CTAB, Breathing unlabored, No Rhonchi/Rales/Wheezing  Abdomen: Soft, Non -tender, +BS   Extremities: No Rashes, No edema  Neuro: A/o x 3, No focal deficits  Psch: normal mood , normal affect    LABS:                          8.3    20.6  )-----------( 472      ( 27 Mar 2018 07:19 )             27.6                           9.1    23.2  )-----------( 547      ( 26 Mar 2018 07:15 )             28.6       03    144  |  107  |  57<H>  ----------------------------<  124<H>  4.8   |  25  |  1.82<H>    Ca    9.1      27 Mar 2018 07:19  Mg     1.6         TPro  6.3  /  Alb  3.3  /  TBili  0.5  /  DBili  x   /  AST  20  /  ALT  32  /  AlkPhos  92      142  |  105  |  63<H>  ----------------------------<  118<H>  4.8   |  24  |  2.14<H>    Creatinine, Serum: 2.23 mg/dL (18 @ 07:32)        Ca    9.1      26 Mar 2018 07:15      PT/INR - ( 27 Mar 2018 07:19 )   PT: 12.7 sec;   INR: 1.16 ratio         PTT - ( 27 Mar 2018 07:19 )  PTT:76.8 sec      Tacrolimus (), Serum: 12.9:  (18 @ 09:04) Subjective: pt sitting in bed feels well   PT ambulated pt in halls with walker   No events overnight     Medications:  acetaminophen   Tablet. 650 milliGRAM(s) Oral every 6 hours PRN  ALBUTerol/ipratropium for Nebulization 3 milliLiter(s) Nebulizer every 6 hours  aspirin enteric coated 81 milliGRAM(s) Oral daily  atovaquone Suspension 1500 milliGRAM(s) Oral daily  Calcium Citrate + Vit D, 315 mg/200 Unit 2 Tablet(s) 2 Tablet(s) Oral two times a day  diltiazem    milliGRAM(s) Oral daily  docusate sodium 100 milliGRAM(s) Oral three times a day  famotidine    Tablet 20 milliGRAM(s) Oral daily  heparin  Injectable 26636 Unit(s) SubCutaneous every 12 hours  insulin lispro (HumaLOG) corrective regimen sliding scale   SubCutaneous three times a day before meals  insulin lispro (HumaLOG) corrective regimen sliding scale   SubCutaneous at bedtime  multivitamin 1 Tablet(s) Oral daily  mycophenolate mofetil 1000 milliGRAM(s) Oral <User Schedule>  nystatin    Suspension 979482 Unit(s) Oral every 6 hours  pravastatin 20 milliGRAM(s) Oral at bedtime  predniSONE   Tablet 30 milliGRAM(s) Oral every 12 hours  silver sulfADIAZINE 1% Cream 1 Application(s) Topical two times a day  sodium bicarbonate 650 milliGRAM(s) Oral three times a day  tacrolimus 8 milliGRAM(s) Oral <User Schedule>  valGANciclovir 450 milliGRAM(s) Oral <User Schedule>      PHYSICAL EXAM:  Vital Signs Last 24 Hrs  T(C): 36.6 (27 Mar 2018 07:39), Max: 36.6 (26 Mar 2018 23:16)  T(F): 97.9 (27 Mar 2018 07:39), Max: 97.9 (26 Mar 2018 23:16)  HR: 107 (27 Mar 2018 07:39) (96 - 107)  BP: 127/83 (27 Mar 2018 07:39) (127/83 - 139/85)  BP(mean): 109 (27 Mar 2018 03:22) (96 - 109)  RR: 18 (27 Mar 2018 07:39) (17 - 18)  SpO2: 100% (27 Mar 2018 07:39) (97% - 100%)  Daily Weight in k.2 (27 Mar 2018 06:33)  I&O's Detail    26 Mar 2018 07:01  -  27 Mar 2018 07:00  --------------------------------------------------------  IN:    Oral Fluid: 620 mL  Total IN: 620 mL    OUT:    Voided: 1550 mL  Total OUT: 1550 mL    Total NET: -930 mL          General: A/ox 3, No acute Distress  Neck: Supple, JVD 10   Cardiac: S1 S2, No M/R/G  Pulmonary: CTAB, Breathing unlabored, No Rhonchi/Rales/Wheezing  Abdomen: Soft, Non -tender, +BS  wound vac to driveline site small drainage   Extremities: No Rashes, 1+ edema  R arm ace wrap in place and digit dsg intact   Finger warm mobile  + pp no swelling or pain good cap refill   Neuro: A/o x 3, No focal deficits    Psch: normal mood , normal affect    LABS:                          8.3    20.6  )-----------( 472      ( 27 Mar 2018 07:19 )             27.6                           9.1    23.2  )-----------( 547      ( 26 Mar 2018 07:15 )             28.6           144  |  107  |  57<H>  ----------------------------<  124<H>  4.8   |  25  |  1.82<H>    Ca    9.1      27 Mar 2018 07:19  Mg     1.6         TPro  6.3  /  Alb  3.3  /  TBili  0.5  /  DBili  x   /  AST  20  /  ALT  32  /  AlkPhos  92      142  |  105  |  63<H>  ----------------------------<  118<H>  4.8   |  24  |  2.14<H>    Creatinine, Serum: 2.23 mg/dL (18 @ 07:32)        Ca    9.1      26 Mar 2018 07:15      PT/INR - ( 27 Mar 2018 07:19 )   PT: 12.7 sec;   INR: 1.16 ratio         PTT - ( 27 Mar 2018 07:19 )  PTT:76.8 sec    Tacrolimus (), Serum: 9.7:  (18 @ 07:57)  Tacrolimus (), Serum: 12.9:  (18 @ 09:04)  Tacrolimus (), Serum: 10.1:  (18 @ 08:42)    < from: Transthoracic Echocardiogram (18 @ 20:14) >  A:     4.7    2.0 - 4.0 cm  Ao:     3.6    2.0 - 3.8 cm  SEPTUM: 1.0    0.6 - 1.2 cm  PWT:    1.0    0.6 - 1.1 cm  LVIDd:  4.5    3.0 - 5.6 cm  LVIDs:  2.9    1.8 - 4.0 cm  Derived variables:  LVMI: 80 g/m2  RWT: 0.44  Fractional short: 36 %  EF (Visual Estimate): 55-60 %  Doppler Peak Velocity (m/sec): AoV=1.0    < from: Transthoracic Echocardiogram (18 @ 20:14) >  onclusions:  1. Normal mitral valve. Minimal mitral regurgitation.  2. Normal trileaflet aortic valve. No aortic valve  regurgitation seen.  3. Left atrial enlargement status-post cardiac transplant.  Mobile interatrial septum.  4. Normal left ventricular systolic function. Paradoxical  septal motion consistent with prior open-heart surgery.  5. Normal right ventricular size with mildly decreased  right ventricular systolic function. TAPSE=1.1 cm.  6. Estimated right ventricular systolic pressure equals 49  mm Hg, assuming right atrial pressure equals > 15 mmHg,  consistent with mild pulmonary hypertension.  7. No pericardial effusion.    < from: [facility] Duplex Upper Ext Vein Scan, Bilat (18 @ 17:12) >  mpression: Persistent deep venous thrombosis right internal jugular   vein, right innominate vein and medial segment of the right subclavian   vein. The thrombus in these vessels is partially occlusive.The   previously seen thrombus within the right cephalic vein has resolved.    Resolution of the previously seen thrombus within the left internal   jugular vein. Again appreciated is partial thrombus within the left   axillary vein.    A more accurate measurement of the previously seen collection within the   medial right upper arm is again appreciated. This represents an evolving   hematoma.

## 2018-03-27 NOTE — PROGRESS NOTE ADULT - SUBJECTIVE AND OBJECTIVE BOX
im ok doc    VITAL SIGNS    Telemetry:  nsr 90    Vital Signs Last 24 Hrs  T(C): 36.6 (18 @ 11:45), Max: 36.6 (18 @ 23:16)  T(F): 97.9 (18 @ 11:45), Max: 97.9 (18 @ 23:16)  HR: 103 (18 @ 11:45) (96 - 107)  BP: 120/83 (18 @ 11:45) (120/83 - 139/85)  RR: 18 (18 @ 11:45) (17 - 18)  SpO2: 99% (18 @ 11:45) (97% - 100%)                    @ 07:01  -   @ 07:00  --------------------------------------------------------  IN: 620 mL / OUT: 1550 mL / NET: -930 mL     @ 07:01  -   @ 12:20  --------------------------------------------------------  IN: 240 mL / OUT: 0 mL / NET: 240 mL          Daily     Daily Weight in k.2 (27 Mar 2018 06:33)        CAPILLARY BLOOD GLUCOSE      POCT Blood Glucose.: 110 mg/dL (27 Mar 2018 11:42)  POCT Blood Glucose.: 135 mg/dL (27 Mar 2018 07:08)  POCT Blood Glucose.: 177 mg/dL (26 Mar 2018 22:50)  POCT Blood Glucose.: 216 mg/dL (26 Mar 2018 19:49)                  Coumadin    [ ] YES          [ x ]      NO         Reason:     Heparin sq                          PHYSICAL EXAM        Neurology: alert and oriented x 3, nonfocal, no gross deficits  CV : S1 S2 , RRR   Sternal Wound :  CDI , Stable  Lungs: cta  Abdomen: soft, nontender, nondistended, positive bowel sounds, last bowel movement +, Vac to suction  :           voiding  Extremities:     trace edema - calve tenderness          acetaminophen   Tablet. 650 milliGRAM(s) Oral every 6 hours PRN  ALBUTerol/ipratropium for Nebulization 3 milliLiter(s) Nebulizer every 6 hours  aspirin enteric coated 81 milliGRAM(s) Oral daily  atovaquone Suspension 1500 milliGRAM(s) Oral daily  Calcium Citrate + Vit D, 315 mg/200 Unit 2 Tablet(s) 2 Tablet(s) Oral two times a day  diltiazem    milliGRAM(s) Oral daily  docusate sodium 100 milliGRAM(s) Oral three times a day  famotidine    Tablet 20 milliGRAM(s) Oral daily  heparin  Injectable 16007 Unit(s) SubCutaneous every 12 hours  insulin lispro (HumaLOG) corrective regimen sliding scale   SubCutaneous three times a day before meals  insulin lispro (HumaLOG) corrective regimen sliding scale   SubCutaneous at bedtime  multivitamin 1 Tablet(s) Oral daily  mycophenolate mofetil 1000 milliGRAM(s) Oral <User Schedule>  nystatin    Suspension 977378 Unit(s) Oral every 6 hours  pravastatin 20 milliGRAM(s) Oral at bedtime  predniSONE   Tablet 30 milliGRAM(s) Oral every 12 hours  silver sulfADIAZINE 1% Cream 1 Application(s) Topical two times a day  sodium bicarbonate 650 milliGRAM(s) Oral three times a day  tacrolimus 8 milliGRAM(s) Oral <User Schedule>  valGANciclovir 450 milliGRAM(s) Oral <User Schedule>                    Physical Therapy Rec:   Home  [  ]   Home w/ PT  [  ]  Rehab  [  ]  Discussed with Cardiothoracic Team at AM rounds.

## 2018-03-27 NOTE — PROGRESS NOTE ADULT - SUBJECTIVE AND OBJECTIVE BOX
CC: F/U for Leukocytosis    Saw/spoke to patient. no fevers, no chills. Overall well. No new complaints.    Allergies  No Known Allergies    ANTIMICROBIALS:  atovaquone Suspension 1500 daily  nystatin    Suspension 018945 every 6 hours  valGANciclovir 450 <User Schedule>    PE:    Vital Signs Last 24 Hrs  T(C): 36.6 (27 Mar 2018 11:45), Max: 36.6 (26 Mar 2018 23:16)  T(F): 97.9 (27 Mar 2018 11:45), Max: 97.9 (26 Mar 2018 23:16)  HR: 103 (27 Mar 2018 11:45) (96 - 107)  BP: 120/83 (27 Mar 2018 11:45) (120/83 - 139/85)  BP(mean): 109 (27 Mar 2018 03:22) (96 - 109)  RR: 18 (27 Mar 2018 11:45) (17 - 18)  SpO2: 99% (27 Mar 2018 11:45) (97% - 100%)    Gen: AOx3, NAD, non-toxic, OOB  CV: S1+S2 normal, no murmurs, tachycardic  Resp: Clear bilat, no resp distress, no crackles/wheezes  Abd: Soft, nontender, +BS; wound vac to prior LVAD site  Ext: No LE edema, no wounds; finger blackened, unchanged    LABS:                        8.3    20.6  )-----------( 472      ( 27 Mar 2018 07:19 )             27.6     03-27    144  |  107  |  57<H>  ----------------------------<  124<H>  4.8   |  25  |  1.82<H>    Ca    9.1      27 Mar 2018 07:19  Mg     1.6     03-27    TPro  6.3  /  Alb  3.3  /  TBili  0.5  /  DBili  x   /  AST  20  /  ALT  32  /  AlkPhos  92  03-27    MICROBIOLOGY:    .Sputum Sputum  03-14-18   Rare Pseudomonas aeruginosa (Carbapenem Resistant)  Normal Respiratory [place] present  --  Pseudomonas aeruginosa (Carbapenem Resistant)    .Sputum Sputum  03-11-18   Few Pseudomonas aeruginosa (Carbapenem Resistant)  Normal Respiratory [place] present  --  Pseudomonas aeruginosa (Carbapenem Resistant)    .Urine Clean Catch (Midstream)  03-10-18   No growth    .Blood Blood  03-09-18   No growth at 5 days.      .Blood Blood  03-09-18   No growth at 5 days.     Skin LVAD driveline site  03-05-18   No growth at 48 hours     HIV-1 RNA Quantitative, Viral Load Log: NOT DET. lg /mL (02-02-18 @ 19:25)    RADIOLOGY:    CXR 3/27:    IMPRESSION:  Small right pleural effusion. Focal consolidation in the right lower lobe   with air bronchograms. No pneumothorax.

## 2018-03-27 NOTE — PROGRESS NOTE ADULT - SUBJECTIVE AND OBJECTIVE BOX
[facility] DIVISION OF KIDNEY DISEASES AND HYPERTENSION -- FOLLOW UP NOTE  --------------------------------------------------------------------------------  Chief Complaint:  NORMAN    24 hour events/subjective:        PAST HISTORY  --------------------------------------------------------------------------------  No significant changes to PMH, PSH, FHx, SHx, unless otherwise noted    ALLERGIES & MEDICATIONS  --------------------------------------------------------------------------------  Allergies    No Known Allergies    Intolerances      Standing Inpatient Medications  ALBUTerol/ipratropium for Nebulization 3 milliLiter(s) Nebulizer every 6 hours  aspirin enteric coated 81 milliGRAM(s) Oral daily  atovaquone Suspension 1500 milliGRAM(s) Oral daily  Calcium Citrate + Vit D, 315 mg/200 Unit 2 Tablet(s) 2 Tablet(s) Oral two times a day  diltiazem    milliGRAM(s) Oral daily  docusate sodium 100 milliGRAM(s) Oral three times a day  famotidine    Tablet 20 milliGRAM(s) Oral daily  heparin  Injectable 08383 Unit(s) SubCutaneous every 12 hours  insulin lispro (HumaLOG) corrective regimen sliding scale   SubCutaneous three times a day before meals  insulin lispro (HumaLOG) corrective regimen sliding scale   SubCutaneous at bedtime  multivitamin 1 Tablet(s) Oral daily  mycophenolate mofetil 1000 milliGRAM(s) Oral <User Schedule>  nystatin    Suspension 568449 Unit(s) Oral every 6 hours  pravastatin 20 milliGRAM(s) Oral at bedtime  predniSONE   Tablet 30 milliGRAM(s) Oral every 12 hours  silver sulfADIAZINE 1% Cream 1 Application(s) Topical two times a day  sodium bicarbonate 650 milliGRAM(s) Oral three times a day  tacrolimus 8 milliGRAM(s) Oral <User Schedule>  valGANciclovir 450 milliGRAM(s) Oral <User Schedule>    PRN Inpatient Medications  acetaminophen   Tablet. 650 milliGRAM(s) Oral every 6 hours PRN      REVIEW OF SYSTEMS  --------------------------------------------------------------------------------  Gen: No weight changes, fatigue, fevers/chills, weakness  Skin: No rashes  Head/Eyes/Ears/Mouth: No headache; Normal hearing; Normal vision w/o blurriness; No sinus pain/discomfort, sore throat  Respiratory: No dyspnea, cough, wheezing, hemoptysis  CV: No chest pain, PND, orthopnea  GI: No abdominal pain, diarrhea, constipation, nausea, vomiting, melena, hematochezia      VITALS/PHYSICAL EXAM  --------------------------------------------------------------------------------  T(C): 36.6 (03-27-18 @ 07:39), Max: 36.6 (03-26-18 @ 23:16)  HR: 107 (03-27-18 @ 07:39) (96 - 107)  BP: 127/83 (03-27-18 @ 07:39) (127/83 - 139/85)  RR: 18 (03-27-18 @ 07:39) (17 - 18)  SpO2: 100% (03-27-18 @ 07:39) (97% - 100%)  Wt(kg): --        03-26-18 @ 07:01  -  03-27-18 @ 07:00  --------------------------------------------------------  IN: 620 mL / OUT: 1550 mL / NET: -930 mL    03-27-18 @ 07:01  -  03-27-18 @ 11:04  --------------------------------------------------------  IN: 240 mL / OUT: 0 mL / NET: 240 mL      Physical Exam:  	Gen: NAD, well-appearing  	HEENT: PERRL, supple neck, clear oropharynx  	Pulm: CTA B/L  	CV: RRR, S1S2; no rub  	Back: No spinal or CVA tenderness; no sacral edema  	Abd: +BS, soft, nontender/nondistended  	: No suprapubic tenderness  	UE: Warm, FROM, no clubbing, intact strength; no edema; no asterixis  	LE: Warm, FROM, no clubbing, intact strength; no edema  	    LABS/STUDIES  --------------------------------------------------------------------------------              8.3    20.6  >-----------<  472      [03-27-18 @ 07:19]              27.6     144  |  107  |  57  ----------------------------<  124      [03-27-18 @ 07:19]  4.8   |  25  |  1.82        Ca     9.1     [03-27-18 @ 07:19]      Mg     1.6     [03-27-18 @ 07:19]    TPro  6.3  /  Alb  3.3  /  TBili  0.5  /  DBili  x   /  AST  20  /  ALT  32  /  AlkPhos  92  [03-27-18 @ 07:19]    PT/INR: PT 12.7 , INR 1.16       [03-27-18 @ 07:19]  PTT: 76.8       [03-27-18 @ 07:19]      Creatinine Trend:  SCr 1.82 [03-27 @ 07:19]  SCr 2.14 [03-26 @ 07:15]  SCr 2.23 [03-25 @ 07:32]  SCr 2.39 [03-24 @ 07:12]  SCr 3.03 [03-23 @ 07:20]    Urinalysis - [03-23-18 @ 16:03]      Color Yellow / Appearance Slightly Turbid / SG 1.020 / pH 5.0      Gluc Negative / Ketone Negative  / Bili Negative / Urobili Negative       Blood Small / Protein 30 / Leuk Est Small / Nitrite Negative      RBC 5 / WBC 8 / Hyaline 0 / Gran  / Sq Epi  / Non Sq Epi 4 / Bacteria Few      Iron 22, TIBC 182, %sat 12      [02-13-18 @ 12:44]  Ferritin 79.0      [02-13-18 @ 12:44]  HbA1c 5.3      [03-18-18 @ 11:20]  TSH 1.48      [02-27-18 @ 08:13]  Lipid: chol 62, TG 33, HDL 17, LDL 38      [06-07-17 @ 06:14]      C3 Complement 135      [03-19-18 @ 15:13]  C4 Complement 37      [03-19-18 @ 15:13]  Free Light Chains: kappa 4.36, lambda 5.71, ratio = 0.76      [03-19 @ 15:13]  Immunofixation Serum:   No Monoclonal Band Identified      [03-19-18 @ 15:13]  Cryoglobulin: Negative      [03-19-18 @ 15:13]

## 2018-03-27 NOTE — PROGRESS NOTE ADULT - PROBLEM SELECTOR PLAN 4
Continue subcutaneous heparin given bilateral DVTs.   Repeat ultrasound of upper extremities with improved canalization of veins, however still persistent partial occlusive thrombi Continue subcutaneous heparin given bilateral DVTs.  restart after cardiac biopsy    Ultrasound of upper extremities  with some improvement in some areas and new findings in other  will continue present treatment

## 2018-03-27 NOTE — PROGRESS NOTE ADULT - ASSESSMENT
Ambulated with PT twice around unit today without distress.  Stopped once to rest in chair.  Slept well last night.  Appetite good.  Mood "good." Scheduled for biopsy tomorrow, denies any stress or worry.  Continues to work with HT Coordinator on education; his primary support person (Tahira) met with her yesterday.  Open to talking about his feelings related to transplant which he finds helpful.  Denies symptoms of anxiety or depression. Receptive to support and validation.     DX: Adjustment disorder       Recommendations:   Behavioral Cardiology will continue to follow as needed.

## 2018-03-27 NOTE — PROGRESS NOTE ADULT - PROBLEM SELECTOR PLAN 2
Continue prednisone taper.  Continue CellCept 1000 mg BID  Tacrolimus level Continue prednisone taper. 25 BID   Continue CellCept 1000 mg BID  Tacrolimus level 9.7( 12.9)   Tacro 9mg po bid   Give 1 mg Tacrolimus now  Mag level 1.6 today start Magnesium 400 mg po daily   AM labs please send :   Hep C PCR and CMV PCR

## 2018-03-27 NOTE — PROGRESS NOTE ADULT - ASSESSMENT
Assessment:  1.  Bilateral, extensive upper extremity DVT  - Currently on lovenox  - Swelling of RUE controlled with elevation and ace wrap  2.  Heart Transplant  3.  Acute renal failure  4.  Right 3rf digit ischemia - stable  5.  Right foot bunion      Plan  1  PTT at goal.  Continue heparin at current dose.    2.  R arm is clinically improving , and hematoma is evolving on duplex.  would continue with compression and observation for now.   The arm should always be wrapped with ACE and elevated for now  3.  Ok to hold heparin prior to cardiac biopsies   4.  Repeat upper extremity bilateral venous duplex reviewed with stability   5.  If creatinine clearance improves to above 30 and shows stability, I will discuss with Dr. Parker regarding switching to Eliquis 5mg BID and holding 24 hours prior to biopsies.  Would cont heparin for now.  Appreciate renal recs, if creat <1.5 would convert back to weight based lovenox 1mg/kg BID.      Daysi [id number]

## 2018-03-27 NOTE — PROGRESS NOTE ADULT - ASSESSMENT
Mr. Baez is a 67 year old man with ACC/AHA stage D chronic systolic heart failure due to NICM s/p HM2 LVAD 6/17 c/b pump thrombosis now s/p heart transplant on 2/23 (CMV D-/R+ and Toxo D-/R+), with post-op course c/b primary graft dysfunction, initially thought to be immune-mediated due to positive B-cell flow (negative CDC cross match) and received plasmapharesis/IVIg x2 with improvement in LV function. His course has been complicated by bilateral IJ/subclavian thrombi and pseudomonas infection, which was treated. He currently has acute renal failure of unclear etiology, which is improving. His biopsy is consistent with mild worsening in cellular rejection. However, he does not have clear evidence of allograft dysfunction or low output on exam.    Pt presenting today Mr. Baez is a 67 year old man with ACC/AHA stage D chronic systolic heart failure due to NICM s/p HM2 LVAD 6/17 c/b pump thrombosis now s/p heart transplant on 2/23 (CMV D-/R+ and Toxo D-/R+), with post-op course c/b primary graft dysfunction, initially thought to be immune-mediated due to positive B-cell flow (negative CDC cross match) and received plasmapharesis/IVIg x2 with improvement in LV function. His course has been complicated by bilateral IJ/subclavian thrombi and pseudomonas infection, which was treated. He currently has acute renal failure of unclear etiology, which is improving. His biopsy is consistent with mild worsening in cellular rejection. However, he does not have clear evidence of allograft dysfunction or low output on exam.    Pt presenting today appears well ambulating in halls   appears euvolemic Tacro level 9.7( 12.9)  wbc declining H/H slightly down Magnesium level low 1.6  progressing appears well compensated

## 2018-03-27 NOTE — PROGRESS NOTE ADULT - PROBLEM SELECTOR PLAN 3
Continue atovaquone for PJP and toxoplasmosis prophylaxis  Continue valganciclovir decrease to every other day  for CMV prophylaxis (intermediate risk).  Continue nystatin for thrush prophylaxis Continue atovaquone for PJP and toxoplasmosis prophylaxis  Continue valganciclovir; change to every other day  for CMV prophylaxis (intermediate risk).  Continue nystatin for thrush prophylaxis

## 2018-03-27 NOTE — PROGRESS NOTE ADULT - PROBLEM SELECTOR PLAN 1
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  Tacro increased 3/21 and will monitor crt given recent increase due to worsening cardiac rejection. Crt trending down now    Monitor renal function as you increase tacro  Dose abx now as Crt is improving  Hyperkalemia resolved, cont bicarb till crt normalizes  Can dose valcyte as renal function improves to more frequent  Ok to start bactrim as now crt down to 1.8mg/dl.   Can change to lovenox as well if need be once crt <1.5

## 2018-03-27 NOTE — PROGRESS NOTE ADULT - SUBJECTIVE AND OBJECTIVE BOX
VASCULAR MEDICINE                         CC:  UEDVT    INTERVAL HISTORY:     No CP or SOB. R arm without symptoms.  Creat improving.   Repeat duplex yesterday.    Allergies    No Known Allergies  MEDICATIONS  (STANDING):  ALBUTerol/ipratropium for Nebulization 3 milliLiter(s) Nebulizer every 6 hours  aspirin enteric coated 81 milliGRAM(s) Oral daily  atovaquone Suspension 1500 milliGRAM(s) Oral daily  Calcium Citrate + Vit D, 315 mg/200 Unit 2 Tablet(s) 2 Tablet(s) Oral two times a day  diltiazem    milliGRAM(s) Oral daily  docusate sodium 100 milliGRAM(s) Oral three times a day  famotidine    Tablet 20 milliGRAM(s) Oral daily  heparin  Injectable 00666 Unit(s) SubCutaneous every 12 hours  insulin lispro (HumaLOG) corrective regimen sliding scale   SubCutaneous three times a day before meals  insulin lispro (HumaLOG) corrective regimen sliding scale   SubCutaneous at bedtime  multivitamin 1 Tablet(s) Oral daily  mycophenolate mofetil 1000 milliGRAM(s) Oral <User Schedule>  nystatin    Suspension 917598 Unit(s) Oral every 6 hours  pravastatin 20 milliGRAM(s) Oral at bedtime  predniSONE   Tablet 30 milliGRAM(s) Oral every 12 hours  silver sulfADIAZINE 1% Cream 1 Application(s) Topical two times a day  sodium bicarbonate 650 milliGRAM(s) Oral three times a day  tacrolimus 8 milliGRAM(s) Oral <User Schedule>  valGANciclovir 450 milliGRAM(s) Oral <User Schedule>    MEDICATIONS  (PRN):  acetaminophen   Tablet. 650 milliGRAM(s) Oral every 6 hours PRN Mild Pain (1 - 3)  SOCIAL HISTORY:  unchanged    REVIEW OF SYSTEMS:  CONSTITUTIONAL: No fever, weight loss, or fatigue  EYES: No eye pain, visual disturbances, or discharge  ENMT:  No difficulty hearing, tinnitus, vertigo; No sinus or throat pain  NECK: No pain or stiffness  RESPIRATORY: No cough, wheezing, chills or hemoptysis; No Shortness of Breath  CARDIOVASCULAR: No chest pain, palpitations, passing out, dizziness, or leg swelling  GASTROINTESTINAL: No abdominal or epigastric pain. No nausea, vomiting, or hematemesis; No diarrhea or constipation. No melena or hematochezia.  GENITOURINARY: No dysuria, frequency, hematuria, or incontinence  NEUROLOGICAL: No headaches, memory loss, loss of strength, numbness, or tremors  SKIN: No itching, burning, rashes, or lesions   LYMPH Nodes: No enlarged glands  ENDOCRINE: No heat or cold intolerance; No hair loss  MUSCULOSKELETAL: No joint pain or swelling; No muscle, back, or extremity pain  PSYCHIATRIC: No depression, anxiety, mood swings, or difficulty sleeping  HEME/LYMPH: No easy bruising, or bleeding gums  ALLERY AND IMMUNOLOGIC: No hives or eczema	    [x ] All others negative	  [ ] Unable to obtain    IICU Vital Signs Last 24 Hrs  T(C): 36.6 (27 Mar 2018 11:45), Max: 36.6 (26 Mar 2018 23:16)  T(F): 97.9 (27 Mar 2018 11:45), Max: 97.9 (26 Mar 2018 23:16)  HR: 103 (27 Mar 2018 11:45) (96 - 107)  BP: 120/83 (27 Mar 2018 11:45) (120/83 - 139/85)  BP(mean): 109 (27 Mar 2018 03:22) (96 - 109)  ABP: --  ABP(mean): --  RR: 18 (27 Mar 2018 11:45) (17 - 18)  SpO2: 99% (27 Mar 2018 11:45) (97% - 100%)    SpO2: 100% (24 Mar 2018 19:58) (99% - 100%)  Appearance: Normal	  HEENT:   Normal oral mucosa, PERRL, EOMI	  Lymphatic: No lymphadenopathy  Cardiovascular: Normal S1 S2   Respiratory: Lungs clear to auscultation	  Psychiatry: A & O x 3, Mood & affect appropriate  Gastrointestinal:  Soft, Non-tender, + BS	  Skin: No rashes, No ecchymoses, No cyanosis	  Neurologic: Non-focal  Extremities:  Right upper extremity bicep area fullness and edema which is improving.  Forearm without edema.  Hands are warm.  R 3rd digit is wrapped.                                     8.3    20.6  )-----------( 472      ( 27 Mar 2018 07:19 )             27.6 03-27    144  |  107  |  57<H>  ----------------------------<  124<H>  4.8   |  25  |  1.82<H>    Ca    9.1      27 Mar 2018 07:19  Mg     1.6     03-27    TPro  6.3  /  Alb  3.3  /  TBili  0.5  /  DBili  x   /  AST  20  /  ALT  32  /  AlkPhos  92  03-27      < from: [facility] Duplex Upper Ext Vein Scan, Bilat (03.26.18 @ 17:12) >  Impression: Persistent deep venous thrombosis right internal jugular   vein, right innominate vein and medial segment of the right subclavian   vein. The thrombus in these vessels is partially occlusive.The   previously seen thrombus within the right cephalic vein has resolved.    Resolution of the previously seen thrombus within the left internal   jugular vein. Again appreciated is partial thrombus within the left   axillary vein.    A more accurate measurement of the previously seen collection within the   medial right upper arm is again appreciated. This represents an evolving   hematoma.    < end of copied text >

## 2018-03-27 NOTE — PROGRESS NOTE ADULT - SUBJECTIVE AND OBJECTIVE BOX
Behavioral Cardiology Progress Note     HPI:  Mr. Baez is a 67 year-old man, , only son . Owns house he lives in with his brother.  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  No PPH.     Current stressors:   Hospitalization   s/p Heart transplant (18)      Support system/family support: Good support from family and close friend      Coping strategies: Talking with family and staff, watching TV, his pastora, talking to his granddaughter     Understanding of medical illness and treatment plan:   Ongoing education on medication management provided by HT coordinator and other team members. Strategies being utilized to accommodate limited literacy (pictures of medication, frequent teach-back).  Good understanding of need for lifelong immunosuppressant medication, importance of calling HT coordinator.  Benefits from frequent review and teach-back of all education.     MSE:  Seen sitting in chair.  A&Ox3.  Well related with good eye contact. Thought process goal directed. No evidence of any psychosis, delusions, jona.  Mood "good." Affect full range. Insight and judgment adequate.

## 2018-03-27 NOTE — PROGRESS NOTE ADULT - ASSESSMENT
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 Found to have b/l IJ/subclavian thrombi as well.    1) Antimicrobial prophylaxis:  Intermediate risk CMV - Adjust Valcyte for NORMAN to 450 mg daily just TWICE weekly. Last dose on Saturday so will schedule for Tues/Sat.  Intermediate risk Toxo - continue Mepron 1500mg PO daily with food.  PCP - continue Mepron 1500mg PO daily  Thrush - continue Nystatin S/S 5 mL Qid  HCV+ donor/HCV- recipient- follow weekly HCV PCR  check HCV genotype in recipient  Patient will need HCV treatment as an out patient    2) Leukocytosis  - Patient has no signs infection at bedside; no fevers, no chills. No focal complaints, feels well. Note history thrombus, prior episode rejection.  - Monitor for now, low threshold to start antibiotics targeting HCAP if worsening clinical status  - Check blood cultures if any new signs sepsis  - CXR reviewed with ? new consolidation, air bronchograms--note that patient has no clinical signs active pneumonia    Sander Liu MD  Pager [phone]  After 5pm and on weekends call [phone]

## 2018-03-27 NOTE — PROGRESS NOTE ADULT - PROBLEM SELECTOR PLAN 1
EMB #1: 1R/1A, pAMR 1. DSA negative.  EMB #2: 1R/1A, pAMR 1. DSA negative.  EMB #3: 1R/1A, pAMR1. No DSA. RA 12, PA 40/16/27, PCW 11, PA 67%, CO/CI 6.8/3.6. TTE with mild RV enlargement and dysfunction.  EMB #4: 1R/2, pAMR1. RA 10, PA 39/18, Wedge 18, CO/CI 6/3.2  The significance of the pAMR is unclear as he has had C4d deposition since the time of transplant. He has low level DSA also of unclear significance. We will follow closely. We will resend DSA on 3/28 at time of next biopsy. EMB #1: 1R/1A, pAMR 1. DSA negative.  EMB #2: 1R/1A, pAMR 1. DSA negative.  EMB #3: 1R/1A, pAMR1. No DSA. RA 12, PA 40/16/27, PCW 11, PA 67%, CO/CI 6.8/3.6. TTE with mild RV enlargement and dysfunction.  EMB #4: 1R/2, pAMR1. RA 10, PA 39/18, Wedge 18, CO/CI 6/3.2  The significance of the pAMR is unclear as he has had C4d deposition since the time of transplant. He has low level DSA also of unclear significance. We will follow closely. We will resend DSA on 3/28 at time of next biopsy.    Cardiac biopsy 3/28 am   Hold heparin 3/27 pm and 3/28 am dosing

## 2018-03-28 ENCOUNTER — RESULT REVIEW (OUTPATIENT)
Age: 68
End: 2018-03-28

## 2018-03-28 LAB
ANION GAP SERPL CALC-SCNC: 14 MMOL/L — SIGNIFICANT CHANGE UP (ref 5–17)
APTT BLD: 28.7 SEC — SIGNIFICANT CHANGE UP (ref 27.5–37.4)
BUN SERPL-MCNC: 49 MG/DL — HIGH (ref 7–23)
CALCIUM SERPL-MCNC: 9.3 MG/DL — SIGNIFICANT CHANGE UP (ref 8.4–10.5)
CHLORIDE SERPL-SCNC: 104 MMOL/L — SIGNIFICANT CHANGE UP (ref 96–108)
CO2 SERPL-SCNC: 25 MMOL/L — SIGNIFICANT CHANGE UP (ref 22–31)
CREAT SERPL-MCNC: 1.69 MG/DL — HIGH (ref 0.5–1.3)
GLUCOSE BLDC GLUCOMTR-MCNC: 100 MG/DL — HIGH (ref 70–99)
GLUCOSE BLDC GLUCOMTR-MCNC: 110 MG/DL — HIGH (ref 70–99)
GLUCOSE BLDC GLUCOMTR-MCNC: 125 MG/DL — HIGH (ref 70–99)
GLUCOSE BLDC GLUCOMTR-MCNC: 131 MG/DL — HIGH (ref 70–99)
GLUCOSE SERPL-MCNC: 117 MG/DL — HIGH (ref 70–99)
HCT VFR BLD CALC: 31.6 % — LOW (ref 39–50)
HGB BLD-MCNC: 10 G/DL — LOW (ref 13–17)
INR BLD: 1.17 RATIO — HIGH (ref 0.88–1.16)
MAGNESIUM SERPL-MCNC: 1.6 MG/DL — SIGNIFICANT CHANGE UP (ref 1.6–2.6)
MCHC RBC-ENTMCNC: 29.6 PG — SIGNIFICANT CHANGE UP (ref 27–34)
MCHC RBC-ENTMCNC: 31.5 GM/DL — LOW (ref 32–36)
MCV RBC AUTO: 93.9 FL — SIGNIFICANT CHANGE UP (ref 80–100)
PLATELET # BLD AUTO: 479 K/UL — HIGH (ref 150–400)
POTASSIUM SERPL-MCNC: 5.1 MMOL/L — SIGNIFICANT CHANGE UP (ref 3.5–5.3)
POTASSIUM SERPL-SCNC: 5.1 MMOL/L — SIGNIFICANT CHANGE UP (ref 3.5–5.3)
PROTHROM AB SERPL-ACNC: 12.7 SEC — SIGNIFICANT CHANGE UP (ref 9.8–12.7)
RBC # BLD: 3.37 M/UL — LOW (ref 4.2–5.8)
RBC # FLD: 19.1 % — HIGH (ref 10.3–14.5)
SODIUM SERPL-SCNC: 143 MMOL/L — SIGNIFICANT CHANGE UP (ref 135–145)
TACROLIMUS SERPL-MCNC: 12 NG/ML — SIGNIFICANT CHANGE UP
WBC # BLD: 23.8 K/UL — HIGH (ref 3.8–10.5)
WBC # FLD AUTO: 23.8 K/UL — HIGH (ref 3.8–10.5)

## 2018-03-28 PROCEDURE — 71045 X-RAY EXAM CHEST 1 VIEW: CPT | Mod: 26

## 2018-03-28 PROCEDURE — 88350 IMFLUOR EA ADDL 1ANTB STN PX: CPT | Mod: 26

## 2018-03-28 PROCEDURE — 88341 IMHCHEM/IMCYTCHM EA ADD ANTB: CPT | Mod: 26

## 2018-03-28 PROCEDURE — 93505 ENDOMYOCARDIAL BIOPSY: CPT | Mod: 26

## 2018-03-28 PROCEDURE — 88346 IMFLUOR 1ST 1ANTB STAIN PX: CPT | Mod: 26

## 2018-03-28 PROCEDURE — 93451 RIGHT HEART CATH: CPT | Mod: 26,59

## 2018-03-28 PROCEDURE — 88342 IMHCHEM/IMCYTCHM 1ST ANTB: CPT | Mod: 26

## 2018-03-28 PROCEDURE — 99233 SBSQ HOSP IP/OBS HIGH 50: CPT

## 2018-03-28 PROCEDURE — 93308 TTE F-UP OR LMTD: CPT | Mod: 26

## 2018-03-28 PROCEDURE — 88313 SPECIAL STAINS GROUP 2: CPT | Mod: 26

## 2018-03-28 PROCEDURE — 88307 TISSUE EXAM BY PATHOLOGIST: CPT | Mod: 26

## 2018-03-28 PROCEDURE — 99233 SBSQ HOSP IP/OBS HIGH 50: CPT | Mod: GC

## 2018-03-28 PROCEDURE — 93321 DOPPLER ECHO F-UP/LMTD STD: CPT | Mod: 26

## 2018-03-28 PROCEDURE — 76937 US GUIDE VASCULAR ACCESS: CPT | Mod: 26

## 2018-03-28 PROCEDURE — 99232 SBSQ HOSP IP/OBS MODERATE 35: CPT

## 2018-03-28 RX ORDER — HEPARIN SODIUM 5000 [USP'U]/ML
14000 INJECTION INTRAVENOUS; SUBCUTANEOUS
Qty: 0 | Refills: 0 | Status: DISCONTINUED | OUTPATIENT
Start: 2018-03-28 | End: 2018-03-29

## 2018-03-28 RX ORDER — VALGANCICLOVIR 450 MG/1
450 TABLET, FILM COATED ORAL
Qty: 0 | Refills: 0 | Status: DISCONTINUED | OUTPATIENT
Start: 2018-03-29 | End: 2018-03-29

## 2018-03-28 RX ORDER — TACROLIMUS 5 MG/1
9 CAPSULE ORAL
Qty: 0 | Refills: 0 | Status: DISCONTINUED | OUTPATIENT
Start: 2018-03-28 | End: 2018-03-29

## 2018-03-28 RX ADMIN — Medication 25 MILLIGRAM(S): at 18:26

## 2018-03-28 RX ADMIN — Medication 100 MILLIGRAM(S): at 11:24

## 2018-03-28 RX ADMIN — Medication 240 MILLIGRAM(S): at 06:05

## 2018-03-28 RX ADMIN — Medication 30 MILLIGRAM(S): at 06:05

## 2018-03-28 RX ADMIN — HEPARIN SODIUM 14000 UNIT(S): 5000 INJECTION INTRAVENOUS; SUBCUTANEOUS at 21:41

## 2018-03-28 RX ADMIN — Medication 3 MILLILITER(S): at 11:22

## 2018-03-28 RX ADMIN — Medication 1 TABLET(S): at 11:24

## 2018-03-28 RX ADMIN — Medication 1 APPLICATION(S): at 18:31

## 2018-03-28 RX ADMIN — Medication 650 MILLIGRAM(S): at 06:04

## 2018-03-28 RX ADMIN — Medication 500000 UNIT(S): at 06:05

## 2018-03-28 RX ADMIN — TACROLIMUS 9 MILLIGRAM(S): 5 CAPSULE ORAL at 19:57

## 2018-03-28 RX ADMIN — FAMOTIDINE 20 MILLIGRAM(S): 10 INJECTION INTRAVENOUS at 11:24

## 2018-03-28 RX ADMIN — Medication 1 APPLICATION(S): at 06:11

## 2018-03-28 RX ADMIN — MAGNESIUM OXIDE 400 MG ORAL TABLET 400 MILLIGRAM(S): 241.3 TABLET ORAL at 19:56

## 2018-03-28 RX ADMIN — ATOVAQUONE 1500 MILLIGRAM(S): 750 SUSPENSION ORAL at 11:23

## 2018-03-28 RX ADMIN — Medication 81 MILLIGRAM(S): at 11:23

## 2018-03-28 RX ADMIN — Medication 20 MILLIGRAM(S): at 21:40

## 2018-03-28 RX ADMIN — MYCOPHENOLATE MOFETIL 1000 MILLIGRAM(S): 250 CAPSULE ORAL at 07:57

## 2018-03-28 RX ADMIN — Medication 650 MILLIGRAM(S): at 21:40

## 2018-03-28 RX ADMIN — Medication 650 MILLIGRAM(S): at 11:25

## 2018-03-28 RX ADMIN — Medication 3 MILLILITER(S): at 18:25

## 2018-03-28 RX ADMIN — Medication 3 MILLILITER(S): at 06:04

## 2018-03-28 RX ADMIN — MAGNESIUM OXIDE 400 MG ORAL TABLET 400 MILLIGRAM(S): 241.3 TABLET ORAL at 11:24

## 2018-03-28 RX ADMIN — Medication 500000 UNIT(S): at 11:22

## 2018-03-28 RX ADMIN — MYCOPHENOLATE MOFETIL 1000 MILLIGRAM(S): 250 CAPSULE ORAL at 19:57

## 2018-03-28 RX ADMIN — Medication 500000 UNIT(S): at 18:26

## 2018-03-28 RX ADMIN — TACROLIMUS 9 MILLIGRAM(S): 5 CAPSULE ORAL at 07:58

## 2018-03-28 NOTE — PROGRESS NOTE ADULT - ASSESSMENT
Mr. Baez is a 67 year old man with ACC/AHA stage D chronic systolic heart failure due to NICM s/p HM2 LVAD 6/17 c/b pump thrombosis now s/p heart transplant on 2/23 (CMV D-/R+ and Toxo D-/R+), with post-op course c/b primary graft dysfunction, initially thought to be immune-mediated due to positive B-cell flow (negative CDC cross match) and received plasmapharesis/IVIg x2 with improvement in LV function. His course has been complicated by bilateral IJ/subclavian thrombi and pseudomonas infection, which was treated.    Pt presenting today with improving  creatinine 1.6(1.8) Mag 1.6 Tacro level    (9.7) Mr. Baez is a 67 year old man with ACC/AHA stage D chronic systolic heart failure due to NICM s/p HM2 LVAD 6/17 c/b pump thrombosis now s/p heart transplant on 2/23 (CMV D-/R+ and Toxo D-/R+), with post-op course c/b primary graft dysfunction, initially thought to be immune-mediated due to positive B-cell flow (negative CDC cross match) and received plasmapharesis/IVIg x2 with improvement in LV function. His course has been complicated by bilateral IJ/subclavian thrombi and pseudomonas infection, which was treated.    Pt presenting today with improving  creatinine 1.6(1.8)  Mag 1.6    Tacro level12. (9.7) cardiac biopsy today tolerated well

## 2018-03-28 NOTE — PROGRESS NOTE ADULT - ASSESSMENT
Assessment:  1.  Bilateral, extensive upper extremity DVT  - Currently on lovenox  - Swelling of RUE controlled with elevation and ace wrap  2.  Heart Transplant  3.  Acute renal failure  4.  Right 3rf digit ischemia - stable  5.  Right foot bunion      Plan  1  If access site is stable post biopsy - then would resume heparin without a bolus at prior rate.    2.  R arm is clinically improving , and hematoma is evolving on duplex.  would continue with compression and observation for now.    3.  Ok to hold heparin prior to cardiac biopsies   4.  Would continue with weekly surveillance with upper extremity duplex as we are holding a/c so often.    5.  If creatinine clearance improves to above 30 and shows stability then we would either go back to lovenox (or even Apixaban)    [place] [phone]

## 2018-03-28 NOTE — PROGRESS NOTE ADULT - ASSESSMENT
This is a 67 year old man with ACC/AHA stage D chronic systolic heart failure due to NICM (LVEF 20%) s/p HM2 LVAD 6/17 c/b pump thrombus now s/p OHT 2/23 (CMV D-/R+ and Toxo D-/R+), with post-op course c/b primary graft dysfunction, initially thought to be immune-mediated due to positive B-cell flow (negative CDC cross match) and received plasmapheresis/IVIG x2 with improvement in LV function. Found to have b/l IJ/subclavian thrombi as well. CrCl 46.    1) Antimicrobial prophylaxis:  Intermediate risk CMV - Can increase to Valcyte 450mg daily (improving CrCl)  Intermediate risk Toxo/PCP - continue Mepron 1500mg PO daily with food.  Thrush - continue Nystatin S/S 5 mL Qid  HCV+ donor/HCV- recipient- follow weekly HCV PCR  check HCV genotype in recipient  Patient will need HCV treatment as an out patient    2) Leukocytosis  - Patient has no signs infection at bedside; no fevers, no chills. No focal complaints, feels well. Note history thrombus, prior episode rejection.  - Monitor for now, low threshold to start antibiotics targeting HCAP if worsening clinical status  - Check blood cultures if any new signs sepsis  - CXR reviewed with ? consolidation, air bronchograms--note that patient has no clinical signs active pneumonia    Sander Liu MD  Pager [phone]  After 5pm and on weekends call [phone]

## 2018-03-28 NOTE — PROGRESS NOTE ADULT - ATTENDING COMMENTS
Doing well. Ambulating. Underwent cath today with mildly elevated filling pressures but good CO. Biopsy resulted grade 0R/AMR 1. DSA pending. Prograf level 12 on prograf 9/9. Plan for d/c early next week.

## 2018-03-28 NOTE — PROGRESS NOTE ADULT - SUBJECTIVE AND OBJECTIVE BOX
[facility] Division of Kidney Diseases & Hypertension  FOLLOW UP NOTE  --------------------------------------------------------------------------------  Chief Complaint:  ARF, S/P cardiac transplant    24 hour events/subjective:        PAST HISTORY  --------------------------------------------------------------------------------  No significant changes to PMH, PSH, FHx, SHx, unless otherwise noted    ALLERGIES & MEDICATIONS  --------------------------------------------------------------------------------  Allergies    No Known Allergies    Intolerances      Standing Inpatient Medications  ALBUTerol/ipratropium for Nebulization 3 milliLiter(s) Nebulizer every 6 hours  aspirin enteric coated 81 milliGRAM(s) Oral daily  atovaquone Suspension 1500 milliGRAM(s) Oral daily  Calcium Citrate + Vit D, 315 mg/200 Unit 2 Tablet(s) 2 Tablet(s) Oral two times a day  diltiazem    milliGRAM(s) Oral daily  docusate sodium 100 milliGRAM(s) Oral three times a day  famotidine    Tablet 20 milliGRAM(s) Oral daily  heparin  Injectable 95873 Unit(s) SubCutaneous <User Schedule>  insulin lispro (HumaLOG) corrective regimen sliding scale   SubCutaneous three times a day before meals  insulin lispro (HumaLOG) corrective regimen sliding scale   SubCutaneous at bedtime  magnesium oxide 400 milliGRAM(s) Oral <User Schedule>  multivitamin 1 Tablet(s) Oral daily  mycophenolate mofetil 1000 milliGRAM(s) Oral <User Schedule>  nystatin    Suspension 590432 Unit(s) Oral every 6 hours  pravastatin 20 milliGRAM(s) Oral at bedtime  predniSONE   Tablet 25 milliGRAM(s) Oral every 12 hours  silver sulfADIAZINE 1% Cream 1 Application(s) Topical two times a day  sodium bicarbonate 650 milliGRAM(s) Oral three times a day  tacrolimus 9 milliGRAM(s) Oral <User Schedule>    PRN Inpatient Medications  acetaminophen   Tablet. 650 milliGRAM(s) Oral every 6 hours PRN      REVIEW OF SYSTEMS  --------------------------------------------------------------------------------  Gen: No fevers/chills, weakness  Skin: ecchymoses  Head/Eyes/Ears/Mouth: No headache;   Respiratory: minimal dyspnea, cough,   CV: No chest pain, (other than surgical site)  GI: No abdominal pain,   : No dysuria, hematuria,  MSK: + edema  Neuro: No dizziness  Psych: No new nervousness, anxiety, stress, depression    All other systems were reviewed and are negative, except as noted.    VITALS/PHYSICAL EXAM  --------------------------------------------------------------------------------  T(C): 37 (03-28-18 @ 22:00), Max: 37 (03-28-18 @ 22:00)  HR: 100 (03-28-18 @ 22:00) (96 - 107)  BP: 129/84 (03-28-18 @ 22:00) (129/84 - 152/96)  RR: 16 (03-28-18 @ 22:00) (16 - 20)  SpO2: 97% (03-28-18 @ 22:00) (95% - 100%)  Wt(kg): --        03-27-18 @ 07:01  -  03-28-18 @ 07:00  --------------------------------------------------------  IN: 360 mL / OUT: 1150 mL / NET: -790 mL    03-28-18 @ 07:01  -  03-28-18 @ 22:15  --------------------------------------------------------  IN: 550 mL / OUT: 750 mL / NET: -200 mL      Physical Exam:  	Gen: NAD,   	HEENT: -icterus  	Pulm: CTA B/L  	CV: RRR, S1S2; no rub;drainlines  	Back: No spinal or CVA tenderness; no sacral edema  	Abd: +BS, soft, nontender/nondistended  	: No suprapubic tenderness  	UE: Warm, FROM, no clubbing, intact strength; no edema; no asterixis  	LE: Warm, FROM, no clubbing, intact strength; no edema  	Neuro: No focal deficits, intact gait  	Psych: Normal affect and mood  	Skin: Warm, without rashes  	Vascular access:    LABS/STUDIES  --------------------------------------------------------------------------------              10.0   23.8  >-----------<  479      [03-28-18 @ 07:15]              31.6     143  |  104  |  49  ----------------------------<  117      [03-28-18 @ 07:15]  5.1   |  25  |  1.69        Ca     9.3     [03-28-18 @ 07:15]      Mg     1.6     [03-28-18 @ 07:15]    TPro  6.3  /  Alb  3.3  /  TBili  0.5  /  DBili  x   /  AST  20  /  ALT  32  /  AlkPhos  92  [03-27-18 @ 07:19]    PT/INR: PT 12.7 , INR 1.17       [03-28-18 @ 07:15]  PTT: 28.7       [03-28-18 @ 07:15]      Creatinine Trend:  SCr 1.69 [03-28 @ 07:15]  SCr 1.82 [03-27 @ 07:19]  SCr 2.14 [03-26 @ 07:15]  SCr 2.23 [03-25 @ 07:32]  SCr 2.39 [03-24 @ 07:12]    Urinalysis - [03-23-18 @ 16:03]      Color Yellow / Appearance Slightly Turbid / SG 1.020 / pH 5.0      Gluc Negative / Ketone Negative  / Bili Negative / Urobili Negative       Blood Small / Protein 30 / Leuk Est Small / Nitrite Negative      RBC 5 / WBC 8 / Hyaline 0 / Gran  / Sq Epi  / Non Sq Epi 4 / Bacteria Few

## 2018-03-28 NOTE — PROGRESS NOTE ADULT - PROBLEM SELECTOR PLAN 4
Continue subcutaneous heparin given bilateral DVTs.  restart after cardiac biopsy    Ultrasound of upper extremities  with some improvement in some areas and new findings in other  will continue present treatment

## 2018-03-28 NOTE — PROGRESS NOTE ADULT - SUBJECTIVE AND OBJECTIVE BOX
CC: F/U for Leukocytosis    Saw/spoke to patient. No fevers, no chills. Appears well, unchanged. No new complaints.    Allergies  No Known Allergies    ANTIMICROBIALS:  atovaquone Suspension 1500 daily  nystatin    Suspension 839350 every 6 hours    PE:    Vital Signs Last 24 Hrs  T(C): 36.8 (28 Mar 2018 11:37), Max: 36.8 (28 Mar 2018 11:37)  T(F): 98.2 (28 Mar 2018 11:37), Max: 98.2 (28 Mar 2018 11:37)  HR: 98 (28 Mar 2018 11:37) (96 - 107)  BP: 139/91 (28 Mar 2018 11:37) (123/84 - 152/96)  BP(mean): 110 (28 Mar 2018 11:37) (100 - 118)  RR: 20 (28 Mar 2018 11:37) (17 - 20)  SpO2: 97% (28 Mar 2018 11:37) (96% - 100%)    Gen: AOx3, NAD, non-toxic  CV: S1+S2 normal, no murmurs, nontachycardic  Resp: Clear bilat, no resp distress, no crackles/wheezes  Abd: Soft, nontender, +BS; driveline site clean/dry (examined during wound vac exchange)  Ext: No LE edema, no wounds; R middle finger unchanged    LABS:                        10.0   23.8  )-----------( 479      ( 28 Mar 2018 07:15 )             31.6     03-28    143  |  104  |  49<H>  ----------------------------<  117<H>  5.1   |  25  |  1.69<H>    Ca    9.3      28 Mar 2018 07:15  Mg     1.6     03-28    TPro  6.3  /  Alb  3.3  /  TBili  0.5  /  DBili  x   /  AST  20  /  ALT  32  /  AlkPhos  92  03-27    MICROBIOLOGY:    .Sputum Sputum  03-14-18   Rare Pseudomonas aeruginosa (Carbapenem Resistant)  Normal Respiratory [place] present  --  Pseudomonas aeruginosa (Carbapenem Resistant)    .Sputum Sputum  03-11-18   Few Pseudomonas aeruginosa (Carbapenem Resistant)  Normal Respiratory [place] present  --  Pseudomonas aeruginosa (Carbapenem Resistant)    .Urine Clean Catch (Midstream)  03-10-18   No growth     .Blood Blood  03-09-18   No growth at 5 days.      .Blood Blood  03-09-18   No growth at 5 days.     Skin LVAD driveline site  03-05-18   No growth at 48 hours      .Sputum Sputum  03-02-18   Few Pseudomonas aeruginosa (Carbapenem Resistant)  Normal Respiratory [place] present  --  Pseudomonas aeruginosa (Carbapenem Resistant)    RADIOLOGY:    3/28 CXR:    FINDINGS:  Right pleural effusion with adjacent passive atelectasis.  Focal consolidation in the right lower lobe with air bronchograms.  No pneumothorax.  Cardiac size is not accurately evaluated in this projection.  Sternotomy.    IMPRESSION:  Unchanged focal right lower lobe consolidation with air bronchograms and   small right pleural effusion.

## 2018-03-28 NOTE — PROGRESS NOTE ADULT - PROBLEM SELECTOR PLAN 2
Continue prednisone taper. 20  Continue CellCept 1000 mg BID  Tacrolimus level   Tacro 9mg po bid   Give 1 mg Tacrolimus now  Mag level 1.6 today start Magnesium 400 mg po daily   AM labs please send :   Hep C PCR and CMV PCR Continue prednisone taper.  Continue CellCept 1000 mg BID  Tacrolimus level 12  Continue Tacro 9mg bid   Tacro 9mg po bid   Mag level 1.6 today start Magnesium 400 mg po daily   RHC today   Restart Heparin tonight   Echo today   on going support and education

## 2018-03-28 NOTE — PROGRESS NOTE ADULT - PROBLEM SELECTOR PLAN 3
Continue atovaquone for PJP and toxoplasmosis prophylaxis  Continue valganciclovir; change to every other day  for CMV prophylaxis (intermediate risk).  Continue nystatin for thrush prophylaxis

## 2018-03-28 NOTE — PROGRESS NOTE ADULT - SUBJECTIVE AND OBJECTIVE BOX
Subjective:    Medications:  acetaminophen   Tablet. 650 milliGRAM(s) Oral every 6 hours PRN  ALBUTerol/ipratropium for Nebulization 3 milliLiter(s) Nebulizer every 6 hours  aspirin enteric coated 81 milliGRAM(s) Oral daily  atovaquone Suspension 1500 milliGRAM(s) Oral daily  Calcium Citrate + Vit D, 315 mg/200 Unit 2 Tablet(s) 2 Tablet(s) Oral two times a day  diltiazem    milliGRAM(s) Oral daily  docusate sodium 100 milliGRAM(s) Oral three times a day  famotidine    Tablet 20 milliGRAM(s) Oral daily  insulin lispro (HumaLOG) corrective regimen sliding scale   SubCutaneous three times a day before meals  insulin lispro (HumaLOG) corrective regimen sliding scale   SubCutaneous at bedtime  magnesium oxide 400 milliGRAM(s) Oral <User Schedule>  multivitamin 1 Tablet(s) Oral daily  mycophenolate mofetil 1000 milliGRAM(s) Oral <User Schedule>  nystatin    Suspension 804359 Unit(s) Oral every 6 hours  pravastatin 20 milliGRAM(s) Oral at bedtime  predniSONE   Tablet 25 milliGRAM(s) Oral every 12 hours  silver sulfADIAZINE 1% Cream 1 Application(s) Topical two times a day  sodium bicarbonate 650 milliGRAM(s) Oral three times a day  tacrolimus 9 milliGRAM(s) Oral <User Schedule>  valGANciclovir 450 milliGRAM(s) Oral <User Schedule>      PHYSICAL EXAM:  Vital Signs Last 24 Hrs  T(C): 36.7 (28 Mar 2018 07:22), Max: 36.7 (28 Mar 2018 03:59)  T(F): 98 (28 Mar 2018 07:22), Max: 98 (28 Mar 2018 03:59)  HR: 106 (28 Mar 2018 07:22) (96 - 107)  BP: 152/89 (28 Mar 2018 07:22) (120/83 - 152/89)  BP(mean): 100 (27 Mar 2018 16:00) (100 - 100)  RR: 18 (28 Mar 2018 07:22) (17 - 18)  SpO2: 96% (28 Mar 2018 07:22) (96% - 99%)    Daily Weight in k (28 Mar 2018 06:39)  I&O's Detail    27 Mar 2018 07:01  -  28 Mar 2018 07:00  --------------------------------------------------------  IN:    Oral Fluid: 360 mL  Total IN: 360 mL    OUT:    Voided: 1150 mL  Total OUT: 1150 mL    Total NET: -790 mL          General: A/ox 3, No acute Distress  Neck: Supple, NO JVD  Cardiac: S1 S2, No M/R/G  Pulmonary: CTAB, Breathing unlabored, No Rhonchi/Rales/Wheezing  Abdomen: Soft, Non -tender, +BS   Extremities: No Rashes, No edema  Neuro: A/o x 3, No focal deficits  Psch: normal mood , normal affect    LABS:                          10.0   23.8  )-----------( 479      ( 28 Mar 2018 07:15 )             31.6         143  |  104  |  49<H>  ----------------------------<  117<H>  5.1   |  25  |  1.69<H>  Creatinine, Serum: 1.82 mg/dL (18 @ 07:19)  Creatinine, Serum: 2.14 mg/dL (18 @ 07:15)      Ca    9.3      28 Mar 2018 07:15  Mg     1.6         TPro  6.3  /  Alb  3.3  /  TBili  0.5  /  DBili  x   /  AST  20  /  ALT  32  /  AlkPhos  92          Ca    9.1      27 Mar 2018 07:19  Mg     1.6         TPro  6.3  /  Alb  3.3  /  TBili  0.5  /  DBili  x   /  AST  20  /  ALT  32  /  AlkPhos  92      PT/INR - ( 28 Mar 2018 07:15 )   PT: 12.7 sec;   INR: 1.17 ratio         PTT - ( 28 Mar 2018 07:15 )  PTT:28.7 sec    Tacrolimus (), Serum: 9.7:  (18 @ 07:57)  Tacrolimus (), Serum: 12.9:  (18 @ 09:04) Subjective: pt feeling well   s/p cardiac biopsy today     Medications:  acetaminophen   Tablet. 650 milliGRAM(s) Oral every 6 hours PRN  ALBUTerol/ipratropium for Nebulization 3 milliLiter(s) Nebulizer every 6 hours  aspirin enteric coated 81 milliGRAM(s) Oral daily  atovaquone Suspension 1500 milliGRAM(s) Oral daily  Calcium Citrate + Vit D, 315 mg/200 Unit 2 Tablet(s) 2 Tablet(s) Oral two times a day  diltiazem    milliGRAM(s) Oral daily  docusate sodium 100 milliGRAM(s) Oral three times a day  famotidine    Tablet 20 milliGRAM(s) Oral daily  insulin lispro (HumaLOG) corrective regimen sliding scale   SubCutaneous three times a day before meals  insulin lispro (HumaLOG) corrective regimen sliding scale   SubCutaneous at bedtime  magnesium oxide 400 milliGRAM(s) Oral <User Schedule>  multivitamin 1 Tablet(s) Oral daily  mycophenolate mofetil 1000 milliGRAM(s) Oral <User Schedule>  nystatin    Suspension 003950 Unit(s) Oral every 6 hours  pravastatin 20 milliGRAM(s) Oral at bedtime  predniSONE   Tablet 25 milliGRAM(s) Oral every 12 hours  silver sulfADIAZINE 1% Cream 1 Application(s) Topical two times a day  sodium bicarbonate 650 milliGRAM(s) Oral three times a day  tacrolimus 9 milliGRAM(s) Oral <User Schedule>  valGANciclovir 450 milliGRAM(s) Oral <User Schedule>      PHYSICAL EXAM:  Vital Signs Last 24 Hrs  T(C): 36.7 (28 Mar 2018 07:22), Max: 36.7 (28 Mar 2018 03:59)  T(F): 98 (28 Mar 2018 07:22), Max: 98 (28 Mar 2018 03:59)  HR: 106 (28 Mar 2018 07:22) (96 - 107)  BP: 152/89 (28 Mar 2018 07:22) (120/83 - 152/89)  BP(mean): 100 (27 Mar 2018 16:00) (100 - 100)  RR: 18 (28 Mar 2018 07:22) (17 - 18)  SpO2: 96% (28 Mar 2018 07:22) (96% - 99%)    Daily Weight in k (28 Mar 2018 06:39)  I&O's Detail    27 Mar 2018 07:01  -  28 Mar 2018 07:00  --------------------------------------------------------  IN:    Oral Fluid: 360 mL  Total IN: 360 mL    OUT:    Voided: 1150 mL  Total OUT: 1150 mL    Total NET: -790 mL          General: A/ox 3, No acute Distress  Neck: Supple, NO JVD  Cardiac: S1 S2, No M/R/G  Pulmonary: decreased r >l , Breathing unlabored, No Rhonchi/Rales/Wheezing  Abdomen: Soft, Non -tender, +BS   wound vac to driveline site   Extremities: No Rashes, No edema  Neuro: A/o x 3, No focal deficits  Psch: normal mood , normal affect    LABS:                          10.0   23.8  )-----------( 479      ( 28 Mar 2018 07:15 )             31.6         143  |  104  |  49<H>  ----------------------------<  117<H>  5.1   |  25  |  1.69<H>  Creatinine, Serum: 1.82 mg/dL (18 @ 07:19)  Creatinine, Serum: 2.14 mg/dL (18 @ 07:15)      Ca    9.3      28 Mar 2018 07:15  Mg     1.6         TPro  6.3  /  Alb  3.3  /  TBili  0.5  /  DBili  x   /  AST  20  /  ALT  32  /  AlkPhos  92          Ca    9.1      27 Mar 2018 07:19  Mg     1.6         TPro  6.3  /  Alb  3.3  /  TBili  0.5  /  DBili  x   /  AST  20  /  ALT  32  /  AlkPhos  92      PT/INR - ( 28 Mar 2018 07:15 )   PT: 12.7 sec;   INR: 1.17 ratio         PTT - ( 28 Mar 2018 07:15 )  PTT:28.7 sec  Tacrolimus (), Serum: 12.0:  (18 @ 09:26)  Tacrolimus (), Serum: 9.7:  (18 @ 07:57)  Tacrolimus (), Serum: 12.9:  (.18 @ 09:04)

## 2018-03-28 NOTE — PROGRESS NOTE ADULT - ASSESSMENT
Mr. Baez is a 67 year old man with ACC/AHA stage D chronic systolic heart failure due to NICM s/p HM2 LVAD 6/17 c/b pump thrombosis now s/p heart transplant on 2/23 (CMV D-/R+ and Toxo D-/R+), with post-op course c/b primary graft dysfunction, initially thought to be immune-mediated due to positive B-cell flow (negative CDC cross match) and received plasmapharesis/IVIg x2 with improvement in LV function. His course has been complicated by bilateral IJ/subclavian thrombi. He currently has acute renal failure of unclear etiology. However, he does not have clear evidence of allograft dysfunction or low output on exam. 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     # Immunosuppression:  Tacrolimus -  Will continue dosing at 9 mg BID (increased yesterday). Tacro level 7 this morning from 9.4 Target trough level of 12-14. - - cardizem  240 qd  CellCept - continue 1000 mg PO BID.   Steroids - will give short pulse of oral steroids. Will give 50 mg PO BID for three days with subsequent taper.  Taper starting 3/24 45 mg x2 the 40 x2  decrease by 10  until 15 mg bid- ordered    #Hypertension  - Increase  cardizem 240 mg daily.   	  # Antimicrobial prophylaxis:  Intermediate risk CMV - Valcyte for NORMAN to 450 mg daily just TWICE weekly.   Intermediate risk Toxo - continue Mepron 1500mg PO daily with food.  PCP - continue Mepron 1500mg PO daily  Thrush - continue Nystatin S/S 5 mL QID    # Bilateral IJ/Subclavian thrombi - unchanged on f/u U/S 3/12  -  heparin 13,000 units subcutaneous Q12 (250 units/kg Q12) per vasc cards recs in setting of renal function.  PTT q 6 hrs after administration dosed by Dr Tavarez  - CHERIE negative  Plan for Venous duplex UE on monday per Dr Tavarez    #Acute renal failure, possibly related to vancomycin dosing, improving.   - Vanco stopped.   - He responded to diuretics given on Friday. No further diuresis.     # CAV PPx  - Continue ECASA 81mg PO daily  - Continue pravastatin 20mg PO QHS    # ID   - Pseudomonas in sputum - course of cefepime completed 3/15  - DLES improved and vancomycin stopped given high trough with NORMAN. Per ID, will redose with daptomycin depending upon repeat vanc level this morning.   - silver sulfadizaine dressings per plastics for R 3rd digit.   - HCV viral load and PCR per protocol. Therapy to be initiated as outpatient by Dr. Thomas (Hepatology)  - Transplant ID following    #endo  - BG currently well controlled, ranging 103-157  - SSI AC & HS    # Additional prevention:  - Citracal + D 2 tabs PO BID  - multivitamin 1 tab daily  - Stress ulcer ppx while on steroids: will change pantoprazole to ranitidine per renal recommendations.  - Mag oxide discontinued currently. Mag 1.9  this AM.    #dispo  - Ongoing work with PT for ongoing exercise tolerance prior to d/c    plan d/c home early next week.    3/26 Pt for repeat UE thrombus as per HF, r/o propagation  Creat is improving may need medications adjustment  Ambulate  Biopsy tomorrow, hold heparin tonight    3/27 Biopsy now scheduled for wednesday.    Echo :< from: Transthoracic Echocardiogram (03.26.18 @ 20:14) >  . Normal mitral valve. Minimal mitral regurgitation.  2. Normal trileaflet aortic valve. No aortic valve  regurgitation seen.  3. Left atrial enlargement status-post cardiac transplant.  Mobile interatrial septum.  4. Normal left ventricular systolic function. Paradoxical  septal motion consistent with prior open-heart surgery.  5. Normal right ventricular size with mildly decreased  right ventricular systolic function. TAPSE=1.1 cm.  6. Estimated right ventricular systolic pressure equals 49  mm Hg, assuming right atrial pressure equals > 15 mmHg,  consistent with mild pulmonary hypertension.  7. No pericardial effusion.    < end of copied text >    UE duplex with new innominate vein partial thrombus, cont anticoagulation, now therapeutic. D/w Dr Tavarez  NPO p Mn for biopsy, hold heparin tonight.  Prograf level down , to d/w HF  D/c planning   3/28 prograf increased to 9mg bid.  Renal function continues to improve  Will resume hep sq tonight after biopsy this am

## 2018-03-28 NOTE — PROGRESS NOTE ADULT - PROBLEM SELECTOR PLAN 1
EMB #1: 1R/1A, pAMR 1. DSA negative.  EMB #2: 1R/1A, pAMR 1. DSA negative.  EMB #3: 1R/1A, pAMR1. No DSA. RA 12, PA 40/16/27, PCW 11, PA 67%, CO/CI 6.8/3.6. TTE with mild RV enlargement and dysfunction.  EMB #4: 1R/2, pAMR1. RA 10, PA 39/18, Wedge 18, CO/CI 6/3.2  The significance of the pAMR is unclear as he has had C4d deposition since the time of transplant. He has low level DSA also of unclear significance. We will follow closely. We will resend DSA on 3/28 at time of next biopsy.    Cardiac biopsy 3/28 am   Hold heparin 3/27 pm and 3/28 am dosing

## 2018-03-28 NOTE — PROGRESS NOTE ADULT - SUBJECTIVE AND OBJECTIVE BOX
VASCULAR MEDICINE                         CC:  UEDVT    INTERVAL HISTORY:     No CP or SOB. R arm without symptoms.  Creat improving. S/P biopsy today, heparin held last night and this AM.      Allergies    No Known Allergies    MEDICATIONS  (STANDING):  ALBUTerol/ipratropium for Nebulization 3 milliLiter(s) Nebulizer every 6 hours  aspirin enteric coated 81 milliGRAM(s) Oral daily  atovaquone Suspension 1500 milliGRAM(s) Oral daily  Calcium Citrate + Vit D, 315 mg/200 Unit 2 Tablet(s) 2 Tablet(s) Oral two times a day  diltiazem    milliGRAM(s) Oral daily  docusate sodium 100 milliGRAM(s) Oral three times a day  famotidine    Tablet 20 milliGRAM(s) Oral daily  heparin  Injectable 96705 Unit(s) SubCutaneous <User Schedule>  insulin lispro (HumaLOG) corrective regimen sliding scale   SubCutaneous three times a day before meals  insulin lispro (HumaLOG) corrective regimen sliding scale   SubCutaneous at bedtime  magnesium oxide 400 milliGRAM(s) Oral <User Schedule>  multivitamin 1 Tablet(s) Oral daily  mycophenolate mofetil 1000 milliGRAM(s) Oral <User Schedule>  nystatin    Suspension 328382 Unit(s) Oral every 6 hours  pravastatin 20 milliGRAM(s) Oral at bedtime  predniSONE   Tablet 25 milliGRAM(s) Oral every 12 hours  silver sulfADIAZINE 1% Cream 1 Application(s) Topical two times a day  sodium bicarbonate 650 milliGRAM(s) Oral three times a day  tacrolimus 9 milliGRAM(s) Oral <User Schedule>    MEDICATIONS  (PRN):  acetaminophen   Tablet. 650 milliGRAM(s) Oral every 6 hours PRN Mild Pain (1 - 3)    REVIEW OF SYSTEMS:  CONSTITUTIONAL: No fever, weight loss, or fatigue  EYES: No eye pain, visual disturbances, or discharge  ENMT:  No difficulty hearing, tinnitus, vertigo; No sinus or throat pain  NECK: No pain or stiffness  RESPIRATORY: No cough, wheezing, chills or hemoptysis; No Shortness of Breath  CARDIOVASCULAR: No chest pain, palpitations, passing out, dizziness, or leg swelling  GASTROINTESTINAL: No abdominal or epigastric pain. No nausea, vomiting, or hematemesis; No diarrhea or constipation. No melena or hematochezia.  GENITOURINARY: No dysuria, frequency, hematuria, or incontinence  NEUROLOGICAL: No headaches, memory loss, loss of strength, numbness, or tremors  SKIN: No itching, burning, rashes, or lesions   LYMPH Nodes: No enlarged glands  ENDOCRINE: No heat or cold intolerance; No hair loss  MUSCULOSKELETAL: No joint pain or swelling; No muscle, back, or extremity pain  PSYCHIATRIC: No depression, anxiety, mood swings, or difficulty sleeping  HEME/LYMPH: No easy bruising, or bleeding gums  ALLERY AND IMMUNOLOGIC: No hives or eczema	    [x ] All others negative	  [ ] Unable to obtain      ICU Vital Signs Last 24 Hrs  T(C): 35.6 (28 Mar 2018 16:15), Max: 36.8 (28 Mar 2018 11:37)  T(F): 96.1 (28 Mar 2018 16:15), Max: 98.2 (28 Mar 2018 11:37)  HR: 102 (28 Mar 2018 16:15) (96 - 107)  BP: 131/84 (28 Mar 2018 16:15) (131/84 - 152/96)  BP(mean): 101 (28 Mar 2018 16:15) (101 - 118)  ABP: --  ABP(mean): --  RR: 20 (28 Mar 2018 16:15) (17 - 20)  SpO2: 95% (28 Mar 2018 16:15) (95% - 100%)    Appearance: Normal	  HEENT:   Normal oral mucosa, PERRL, EOMI	  Lymphatic: No lymphadenopathy  Cardiovascular: Normal S1 S2   Respiratory: Lungs clear to auscultation	  Psychiatry: A & O x 3, Mood & affect appropriate  Gastrointestinal:  Soft, Non-tender, + BS	  Skin: No rashes, No ecchymoses, No cyanosis	  Neurologic: Non-focal  Extremities:  Right upper extremity bicep area fullness and edema which is improving.  Forearm without edema.  Hands are warm.  R 3rd digit is wrapped.                            10.0   23.8  )-----------( 479      ( 28 Mar 2018 07:15 )             31.6   03-28    143  |  104  |  49<H>  ----------------------------<  117<H>  5.1   |  25  |  1.69<H>    Ca    9.3      28 Mar 2018 07:15  Mg     1.6     03-28    TPro  6.3  /  Alb  3.3  /  TBili  0.5  /  DBili  x   /  AST  20  /  ALT  32  /  AlkPhos  92  03-27

## 2018-03-28 NOTE — PROGRESS NOTE ADULT - ASSESSMENT
Not sure about circumcision return 2 weeks 1.  ARF--non oliguric, no HD requirement.  Potential vancomycin, calcineurin, other.  Improvement on therapeutic levels prograf favors former.  Minimize nephrotoxin exposure, monitor prograf levels  2.  Cardiac transplant--immunosuppression optimized.  Renal function improving  3.  Hyperkalemia--calcineurin related.  Continue NaHCO3

## 2018-03-29 DIAGNOSIS — N28.1 CYST OF KIDNEY, ACQUIRED: ICD-10-CM

## 2018-03-29 LAB
ANION GAP SERPL CALC-SCNC: 12 MMOL/L — SIGNIFICANT CHANGE UP (ref 5–17)
APTT BLD: 50.2 SEC — HIGH (ref 27.5–37.4)
BASOPHILS # BLD AUTO: 0 K/UL — SIGNIFICANT CHANGE UP (ref 0–0.2)
BASOPHILS NFR BLD AUTO: 0 % — SIGNIFICANT CHANGE UP (ref 0–2)
BUN SERPL-MCNC: 43 MG/DL — HIGH (ref 7–23)
CALCIUM SERPL-MCNC: 9.1 MG/DL — SIGNIFICANT CHANGE UP (ref 8.4–10.5)
CHLORIDE SERPL-SCNC: 105 MMOL/L — SIGNIFICANT CHANGE UP (ref 96–108)
CO2 SERPL-SCNC: 25 MMOL/L — SIGNIFICANT CHANGE UP (ref 22–31)
CREAT SERPL-MCNC: 1.59 MG/DL — HIGH (ref 0.5–1.3)
EOSINOPHIL # BLD AUTO: 0 K/UL — SIGNIFICANT CHANGE UP (ref 0–0.5)
EOSINOPHIL NFR BLD AUTO: 0.2 % — SIGNIFICANT CHANGE UP (ref 0–6)
GLUCOSE BLDC GLUCOMTR-MCNC: 110 MG/DL — HIGH (ref 70–99)
GLUCOSE BLDC GLUCOMTR-MCNC: 137 MG/DL — HIGH (ref 70–99)
GLUCOSE BLDC GLUCOMTR-MCNC: 154 MG/DL — HIGH (ref 70–99)
GLUCOSE BLDC GLUCOMTR-MCNC: 161 MG/DL — HIGH (ref 70–99)
GLUCOSE SERPL-MCNC: 103 MG/DL — HIGH (ref 70–99)
HCT VFR BLD CALC: 29.2 % — LOW (ref 39–50)
HCV RNA SERPL NAA DL=5-ACNC: HIGH IU/ML
HCV RNA SPEC NAA+PROBE-LOG IU: 4.52 LOGIU/ML — HIGH
HGB BLD-MCNC: 9.4 G/DL — LOW (ref 13–17)
INR BLD: 1.15 RATIO — SIGNIFICANT CHANGE UP (ref 0.88–1.16)
LYMPHOCYTES # BLD AUTO: 0.8 K/UL — LOW (ref 1–3.3)
LYMPHOCYTES # BLD AUTO: 4.4 % — LOW (ref 13–44)
MAGNESIUM SERPL-MCNC: 1.9 MG/DL — SIGNIFICANT CHANGE UP (ref 1.6–2.6)
MCHC RBC-ENTMCNC: 30.1 PG — SIGNIFICANT CHANGE UP (ref 27–34)
MCHC RBC-ENTMCNC: 32 GM/DL — SIGNIFICANT CHANGE UP (ref 32–36)
MCV RBC AUTO: 93.9 FL — SIGNIFICANT CHANGE UP (ref 80–100)
MONOCYTES # BLD AUTO: 1.2 K/UL — HIGH (ref 0–0.9)
MONOCYTES NFR BLD AUTO: 6.4 % — SIGNIFICANT CHANGE UP (ref 2–14)
NEUTROPHILS # BLD AUTO: 17.1 K/UL — HIGH (ref 1.8–7.4)
NEUTROPHILS NFR BLD AUTO: 89 % — HIGH (ref 43–77)
PLATELET # BLD AUTO: 434 K/UL — HIGH (ref 150–400)
POTASSIUM SERPL-MCNC: 5 MMOL/L — SIGNIFICANT CHANGE UP (ref 3.5–5.3)
POTASSIUM SERPL-SCNC: 5 MMOL/L — SIGNIFICANT CHANGE UP (ref 3.5–5.3)
PROTHROM AB SERPL-ACNC: 12.5 SEC — SIGNIFICANT CHANGE UP (ref 9.8–12.7)
RBC # BLD: 3.11 M/UL — LOW (ref 4.2–5.8)
RBC # FLD: 18.9 % — HIGH (ref 10.3–14.5)
SODIUM SERPL-SCNC: 142 MMOL/L — SIGNIFICANT CHANGE UP (ref 135–145)
TACROLIMUS SERPL-MCNC: 10 NG/ML — SIGNIFICANT CHANGE UP
WBC # BLD: 19.2 K/UL — HIGH (ref 3.8–10.5)
WBC # FLD AUTO: 19.2 K/UL — HIGH (ref 3.8–10.5)

## 2018-03-29 PROCEDURE — 99232 SBSQ HOSP IP/OBS MODERATE 35: CPT

## 2018-03-29 PROCEDURE — 99233 SBSQ HOSP IP/OBS HIGH 50: CPT

## 2018-03-29 PROCEDURE — 71045 X-RAY EXAM CHEST 1 VIEW: CPT | Mod: 26

## 2018-03-29 PROCEDURE — 90832 PSYTX W PT 30 MINUTES: CPT

## 2018-03-29 RX ORDER — TACROLIMUS 5 MG/1
9 CAPSULE ORAL
Qty: 270 | Refills: 1 | OUTPATIENT
Start: 2018-03-29 | End: 2018-05-27

## 2018-03-29 RX ORDER — TACROLIMUS 5 MG/1
10 CAPSULE ORAL
Qty: 300 | Refills: 1 | OUTPATIENT
Start: 2018-03-29 | End: 2018-05-27

## 2018-03-29 RX ORDER — TACROLIMUS 5 MG/1
9.5 CAPSULE ORAL
Qty: 0 | Refills: 0 | Status: DISCONTINUED | OUTPATIENT
Start: 2018-03-29 | End: 2018-03-29

## 2018-03-29 RX ORDER — TACROLIMUS 5 MG/1
9 CAPSULE ORAL
Qty: 0 | Refills: 0 | Status: DISCONTINUED | OUTPATIENT
Start: 2018-03-30 | End: 2018-04-01

## 2018-03-29 RX ORDER — VALGANCICLOVIR 450 MG/1
1 TABLET, FILM COATED ORAL
Qty: 30 | Refills: 4 | OUTPATIENT
Start: 2018-03-29 | End: 2018-08-25

## 2018-03-29 RX ORDER — SODIUM BICARBONATE 1 MEQ/ML
1 SYRINGE (ML) INTRAVENOUS
Qty: 90 | Refills: 1 | OUTPATIENT
Start: 2018-03-29 | End: 2018-05-27

## 2018-03-29 RX ORDER — TACROLIMUS 5 MG/1
0.5 CAPSULE ORAL ONCE
Qty: 0 | Refills: 0 | Status: COMPLETED | OUTPATIENT
Start: 2018-03-29 | End: 2018-03-29

## 2018-03-29 RX ORDER — DILTIAZEM HCL 120 MG
300 CAPSULE, EXT RELEASE 24 HR ORAL DAILY
Qty: 0 | Refills: 0 | Status: DISCONTINUED | OUTPATIENT
Start: 2018-03-29 | End: 2018-03-31

## 2018-03-29 RX ORDER — MAGNESIUM OXIDE 400 MG ORAL TABLET 241.3 MG
1 TABLET ORAL
Qty: 60 | Refills: 1 | OUTPATIENT
Start: 2018-03-29 | End: 2018-05-27

## 2018-03-29 RX ORDER — ASPIRIN/CALCIUM CARB/MAGNESIUM 324 MG
1 TABLET ORAL
Qty: 30 | Refills: 1 | OUTPATIENT
Start: 2018-03-29 | End: 2018-05-27

## 2018-03-29 RX ORDER — ENOXAPARIN SODIUM 100 MG/ML
80 INJECTION SUBCUTANEOUS
Qty: 0 | Refills: 0 | Status: DISCONTINUED | OUTPATIENT
Start: 2018-03-29 | End: 2018-04-03

## 2018-03-29 RX ORDER — DILTIAZEM HCL 120 MG
1 CAPSULE, EXT RELEASE 24 HR ORAL
Qty: 30 | Refills: 1 | OUTPATIENT
Start: 2018-03-29 | End: 2018-05-27

## 2018-03-29 RX ORDER — TACROLIMUS 5 MG/1
9.5 CAPSULE ORAL ONCE
Qty: 0 | Refills: 0 | Status: COMPLETED | OUTPATIENT
Start: 2018-03-29 | End: 2018-03-29

## 2018-03-29 RX ORDER — VALGANCICLOVIR 450 MG/1
450 TABLET, FILM COATED ORAL DAILY
Qty: 0 | Refills: 0 | Status: DISCONTINUED | OUTPATIENT
Start: 2018-03-29 | End: 2018-04-04

## 2018-03-29 RX ORDER — TACROLIMUS 5 MG/1
10 CAPSULE ORAL
Qty: 0 | Refills: 0 | Status: DISCONTINUED | OUTPATIENT
Start: 2018-03-30 | End: 2018-04-02

## 2018-03-29 RX ORDER — FAMOTIDINE 10 MG/ML
1 INJECTION INTRAVENOUS
Qty: 30 | Refills: 1 | OUTPATIENT
Start: 2018-03-29 | End: 2018-05-27

## 2018-03-29 RX ADMIN — Medication 3 MILLILITER(S): at 01:17

## 2018-03-29 RX ADMIN — Medication 100 MILLIGRAM(S): at 21:54

## 2018-03-29 RX ADMIN — Medication 650 MILLIGRAM(S): at 13:17

## 2018-03-29 RX ADMIN — Medication 650 MILLIGRAM(S): at 21:54

## 2018-03-29 RX ADMIN — Medication 1 APPLICATION(S): at 06:49

## 2018-03-29 RX ADMIN — Medication 1 APPLICATION(S): at 17:30

## 2018-03-29 RX ADMIN — Medication 2: at 12:00

## 2018-03-29 RX ADMIN — MYCOPHENOLATE MOFETIL 1000 MILLIGRAM(S): 250 CAPSULE ORAL at 20:00

## 2018-03-29 RX ADMIN — Medication 3 MILLILITER(S): at 06:48

## 2018-03-29 RX ADMIN — Medication 650 MILLIGRAM(S): at 06:48

## 2018-03-29 RX ADMIN — VALGANCICLOVIR 450 MILLIGRAM(S): 450 TABLET, FILM COATED ORAL at 08:03

## 2018-03-29 RX ADMIN — Medication 500000 UNIT(S): at 11:56

## 2018-03-29 RX ADMIN — Medication 1 TABLET(S): at 11:57

## 2018-03-29 RX ADMIN — FAMOTIDINE 20 MILLIGRAM(S): 10 INJECTION INTRAVENOUS at 11:58

## 2018-03-29 RX ADMIN — Medication 20 MILLIGRAM(S): at 17:32

## 2018-03-29 RX ADMIN — TACROLIMUS 9.5 MILLIGRAM(S): 5 CAPSULE ORAL at 20:01

## 2018-03-29 RX ADMIN — Medication 25 MILLIGRAM(S): at 06:48

## 2018-03-29 RX ADMIN — Medication 3 MILLILITER(S): at 23:27

## 2018-03-29 RX ADMIN — TACROLIMUS 9 MILLIGRAM(S): 5 CAPSULE ORAL at 08:10

## 2018-03-29 RX ADMIN — Medication 500000 UNIT(S): at 23:27

## 2018-03-29 RX ADMIN — Medication 81 MILLIGRAM(S): at 11:57

## 2018-03-29 RX ADMIN — HEPARIN SODIUM 14000 UNIT(S): 5000 INJECTION INTRAVENOUS; SUBCUTANEOUS at 10:14

## 2018-03-29 RX ADMIN — Medication 500000 UNIT(S): at 17:31

## 2018-03-29 RX ADMIN — ATOVAQUONE 1500 MILLIGRAM(S): 750 SUSPENSION ORAL at 11:57

## 2018-03-29 RX ADMIN — MAGNESIUM OXIDE 400 MG ORAL TABLET 400 MILLIGRAM(S): 241.3 TABLET ORAL at 08:02

## 2018-03-29 RX ADMIN — Medication 3 MILLILITER(S): at 11:56

## 2018-03-29 RX ADMIN — MYCOPHENOLATE MOFETIL 1000 MILLIGRAM(S): 250 CAPSULE ORAL at 08:02

## 2018-03-29 RX ADMIN — TACROLIMUS 0.5 MILLIGRAM(S): 5 CAPSULE ORAL at 12:36

## 2018-03-29 RX ADMIN — Medication 20 MILLIGRAM(S): at 21:54

## 2018-03-29 RX ADMIN — ENOXAPARIN SODIUM 80 MILLIGRAM(S): 100 INJECTION SUBCUTANEOUS at 21:54

## 2018-03-29 RX ADMIN — MAGNESIUM OXIDE 400 MG ORAL TABLET 400 MILLIGRAM(S): 241.3 TABLET ORAL at 20:01

## 2018-03-29 RX ADMIN — Medication 240 MILLIGRAM(S): at 06:48

## 2018-03-29 RX ADMIN — Medication 500000 UNIT(S): at 06:48

## 2018-03-29 RX ADMIN — Medication 3 MILLILITER(S): at 17:30

## 2018-03-29 NOTE — PROGRESS NOTE ADULT - PROBLEM SELECTOR PLAN 1
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  Tacro increased 3/21 and will monitor crt given recent increase due to worsening cardiac rejection. Crt trending down now    Monitor renal function as you increase tacro  Dose abx now as Crt is improving  Dosing of all agents such as valcyte as renal function improves to more frequent  Hold off on mepron to bactrim for now given hyperkalemia still slight.    Can change to lovenox as well if need be once crt <1.5

## 2018-03-29 NOTE — PROGRESS NOTE ADULT - PROBLEM SELECTOR PLAN 3
Continue atovaquone for PJP and toxoplasmosis prophylaxis  Continue valganciclovir; change to every other day  for CMV prophylaxis (intermediate risk).  Continue nystatin for thrush prophylaxis Continue atovaquone for PJP and toxoplasmosis prophylaxis  Continue valganciclovir; change to daily   CMV prophylaxis (intermediate risk).  Continue nystatin for thrush prophylaxis

## 2018-03-29 NOTE — PROGRESS NOTE ADULT - SUBJECTIVE AND OBJECTIVE BOX
CC: F/U for Leukocytosis    Saw/spoke to patient. No fevers, no chills. Feels well. No focal complaints.    Allergies  No Known Allergies    ANTIMICROBIALS:  atovaquone Suspension 1500 daily  nystatin    Suspension 148799 every 6 hours  valGANciclovir 450 daily    PE:    Vital Signs Last 24 Hrs  T(C): 36.6 (29 Mar 2018 15:10), Max: 37 (28 Mar 2018 22:00)  T(F): 97.9 (29 Mar 2018 15:10), Max: 98.6 (28 Mar 2018 22:00)  HR: 103 (29 Mar 2018 15:10) (96 - 107)  BP: 117/69 (29 Mar 2018 15:10) (117/69 - 148/95)  BP(mean): 105 (29 Mar 2018 03:46) (105 - 105)  RR: 18 (29 Mar 2018 15:10) (16 - 18)  SpO2: 97% (29 Mar 2018 15:10) (93% - 97%)    Gen: AOx3, NAD, non-toxic, pleasant  CV: S1+S2 normal, no murmurs, nontachycardic, well healed wounds; wound vac to prior drive line site  Resp: Clear bilat, no resp distress, no crackles/wheezes  Abd: Soft, nontender, +BS  Ext: No LE edema, no wounds    LABS:                        9.4    19.2  )-----------( 434      ( 29 Mar 2018 07:21 )             29.2     03-29    142  |  105  |  43<H>  ----------------------------<  103<H>  5.0   |  25  |  1.59<H>    Ca    9.1      29 Mar 2018 07:21  Mg     1.9     03-29    MICROBIOLOGY:    .Sputum Sputum  03-14-18   Rare Pseudomonas aeruginosa (Carbapenem Resistant)  Normal Respiratory [place] present  --  Pseudomonas aeruginosa (Carbapenem Resistant)    .Sputum Sputum  03-11-18   Few Pseudomonas aeruginosa (Carbapenem Resistant)  Normal Respiratory [place] present  --  Pseudomonas aeruginosa (Carbapenem Resistant)    (prior reviewed)    RADIOLOGY:    3/29 CXR:    Lungs: No change in right lower lung consolidation. The left lung is   clear.      Pleura: No change in right pleural effusion.      Heart and Mediastinum: The heart is normal in size.  The aorta is   unremarkable.      Skeletal: Sternotomy.      IMPRESSION:    1.  No change in right lower lobe consolidation is likely secondary to   atelectasis and the right pleural effusion.

## 2018-03-29 NOTE — PROGRESS NOTE ADULT - SUBJECTIVE AND OBJECTIVE BOX
VASCULAR MEDICINE                         CC:  UEDVT    INTERVAL HISTORY:     No CP or SOB. R arm without symptoms.  Now on lovenox.  Allergies    No Known Allergies    MEDICATIONS  (STANDING):  ALBUTerol/ipratropium for Nebulization 3 milliLiter(s) Nebulizer every 6 hours  aspirin enteric coated 81 milliGRAM(s) Oral daily  atovaquone Suspension 1500 milliGRAM(s) Oral daily  Calcium Citrate + Vit D, 315 mg/200 Unit 2 Tablet(s) 2 Tablet(s) Oral two times a day  diltiazem    milliGRAM(s) Oral daily  docusate sodium 100 milliGRAM(s) Oral three times a day  enoxaparin Injectable 80 milliGRAM(s) SubCutaneous two times a day  famotidine    Tablet 20 milliGRAM(s) Oral daily  insulin lispro (HumaLOG) corrective regimen sliding scale   SubCutaneous three times a day before meals  insulin lispro (HumaLOG) corrective regimen sliding scale   SubCutaneous at bedtime  magnesium oxide 400 milliGRAM(s) Oral <User Schedule>  multivitamin 1 Tablet(s) Oral daily  mycophenolate mofetil 1000 milliGRAM(s) Oral <User Schedule>  nystatin    Suspension 750635 Unit(s) Oral every 6 hours  pravastatin 20 milliGRAM(s) Oral at bedtime  predniSONE   Tablet 20 milliGRAM(s) Oral every 12 hours  silver sulfADIAZINE 1% Cream 1 Application(s) Topical two times a day  sodium bicarbonate 650 milliGRAM(s) Oral three times a day  valGANciclovir 450 milliGRAM(s) Oral daily    MEDICATIONS  (PRN):  acetaminophen   Tablet. 650 milliGRAM(s) Oral every 6 hours PRN Mild Pain (1 - 3)      REVIEW OF SYSTEMS:  CONSTITUTIONAL: No fever, weight loss, or fatigue  EYES: No eye pain, visual disturbances, or discharge  ENMT:  No difficulty hearing, tinnitus, vertigo; No sinus or throat pain  NECK: No pain or stiffness  RESPIRATORY: No cough, wheezing, chills or hemoptysis; No Shortness of Breath  CARDIOVASCULAR: No chest pain, palpitations, passing out, dizziness, or leg swelling  GASTROINTESTINAL: No abdominal or epigastric pain. No nausea, vomiting, or hematemesis; No diarrhea or constipation. No melena or hematochezia.  GENITOURINARY: No dysuria, frequency, hematuria, or incontinence  NEUROLOGICAL: No headaches, memory loss, loss of strength, numbness, or tremors  SKIN: No itching, burning, rashes, or lesions   LYMPH Nodes: No enlarged glands  ENDOCRINE: No heat or cold intolerance; No hair loss  MUSCULOSKELETAL: No joint pain or swelling; No muscle, back, or extremity pain  PSYCHIATRIC: No depression, anxiety, mood swings, or difficulty sleeping  HEME/LYMPH: No easy bruising, or bleeding gums  ALLERY AND IMMUNOLOGIC: No hives or eczema	    [x ] All others negative	  [ ] Unable to obtain    =ICU Vital Signs Last 24 Hrs  T(C): 36.6 (29 Mar 2018 15:10), Max: 37 (28 Mar 2018 22:00)  T(F): 97.9 (29 Mar 2018 15:10), Max: 98.6 (28 Mar 2018 22:00)  HR: 103 (29 Mar 2018 15:10) (96 - 103)  BP: 117/69 (29 Mar 2018 15:10) (117/69 - 148/95)  BP(mean): 105 (29 Mar 2018 03:46) (105 - 105)  ABP: --  ABP(mean): --  RR: 18 (29 Mar 2018 15:10) (16 - 18)  SpO2: 97% (29 Mar 2018 15:10) (93% - 97%)  Appearance: Normal	  HEENT:   Normal oral mucosa, PERRL, EOMI	  Lymphatic: No lymphadenopathy  Cardiovascular: Normal S1 S2   Respiratory: Lungs clear to auscultation	  Psychiatry: A & O x 3, Mood & affect appropriate  Gastrointestinal:  Soft, Non-tender, + BS	  Skin: No rashes, No ecchymoses, No cyanosis	  Neurologic: Non-focal  Extremities:  Right upper extremity bicep area fullness and edema which is improving.  Forearm without edema.  Hands are warm.  R 3rd digit is wrapped.                             9.4    19.2  )-----------( 434      ( 29 Mar 2018 07:21 )             29.2   03-29    142  |  105  |  43<H>  ----------------------------<  103<H>  5.0   |  25  |  1.59<H>    Ca    9.1      29 Mar 2018 07:21  Mg     1.9     03-29

## 2018-03-29 NOTE — PROGRESS NOTE ADULT - ASSESSMENT
Assessment:  1.  Bilateral, extensive upper extremity DVT  - Currently on lovenox  - Swelling of RUE controlled with elevation and ace wrap  2.  Heart Transplant  3.  Acute renal failure  4.  Right 3rf digit ischemia - stable  5.  Right foot bunion      Plan  1 Discussed with CHF team - will switch to weight based lovenox 1mg/kg BID as long as creatine clearance is above 30  2.  Arm elevation and wrapping  3.  We favor lovenox over DOAC in post op setting - he is in agreement to inject himself at home upon d/c  4.  Hold lovenox 12 hours before and after biopsy    Thanks      Daysi [id number]

## 2018-03-29 NOTE — PROGRESS NOTE ADULT - SUBJECTIVE AND OBJECTIVE BOX
Subjective:    Medications:  acetaminophen   Tablet. 650 milliGRAM(s) Oral every 6 hours PRN  ALBUTerol/ipratropium for Nebulization 3 milliLiter(s) Nebulizer every 6 hours  aspirin enteric coated 81 milliGRAM(s) Oral daily  atovaquone Suspension 1500 milliGRAM(s) Oral daily  Calcium Citrate + Vit D, 315 mg/200 Unit 2 Tablet(s) 2 Tablet(s) Oral two times a day  diltiazem    milliGRAM(s) Oral daily  docusate sodium 100 milliGRAM(s) Oral three times a day  famotidine    Tablet 20 milliGRAM(s) Oral daily  heparin  Injectable 56977 Unit(s) SubCutaneous <User Schedule>  insulin lispro (HumaLOG) corrective regimen sliding scale   SubCutaneous three times a day before meals  insulin lispro (HumaLOG) corrective regimen sliding scale   SubCutaneous at bedtime  magnesium oxide 400 milliGRAM(s) Oral <User Schedule>  multivitamin 1 Tablet(s) Oral daily  mycophenolate mofetil 1000 milliGRAM(s) Oral <User Schedule>  nystatin    Suspension 016357 Unit(s) Oral every 6 hours  pravastatin 20 milliGRAM(s) Oral at bedtime  predniSONE   Tablet 20 milliGRAM(s) Oral every 12 hours  silver sulfADIAZINE 1% Cream 1 Application(s) Topical two times a day  sodium bicarbonate 650 milliGRAM(s) Oral three times a day  tacrolimus 9 milliGRAM(s) Oral <User Schedule>  valGANciclovir 450 milliGRAM(s) Oral <User Schedule>      PHYSICAL EXAM:  Vital Signs Last 24 Hrs  T(C): 36.6 (29 Mar 2018 07:38), Max: 37 (28 Mar 2018 22:00)  T(F): 97.9 (29 Mar 2018 07:38), Max: 98.6 (28 Mar 2018 22:00)  HR: 101 (29 Mar 2018 07:38) (96 - 107)  BP: 147/96 (29 Mar 2018 07:38) (128/88 - 148/95)  BP(mean): 105 (29 Mar 2018 03:46) (101 - 110)  RR: 18 (29 Mar 2018 07:38) (16 - 20)  SpO2: 96% (29 Mar 2018 07:38) (93% - 97%)  Daily Weight in k (29 Mar 2018 07:40)  I&O's Detail    28 Mar 2018 07:01  -  29 Mar 2018 07:00  --------------------------------------------------------  IN:    Oral Fluid: 1010 mL  Total IN: 1010 mL    OUT:    Voided: 1500 mL  Total OUT: 1500 mL    Total NET: -490 mL          General: A/ox 3, No acute Distress  Neck: Supple, NO JVD  Cardiac: S1 S2, No M/R/G  Pulmonary: CTAB, Breathing unlabored, No Rhonchi/Rales/Wheezing  Abdomen: Soft, Non -tender, +BS   wound vac on driveline site minimal drainage   Extremities: No Rashes, No edema  Neuro: A/o x 3, No focal deficits  Psch: normal mood , normal affect                            9.4    19.2  )-----------( 434      ( 29 Mar 2018 07:21 )             29.2         142  |  105  |  43<H>  ----------------------------<  103<H>  5.0   |  25  |  1.59<H>    Creatinine, Serum: 1.69 mg/dL (18 @ 07:15)  Creatinine, Serum: 1.82 mg/dL (18 @ 07:19)      Tacrolimus Serum: 10.0 (18 @ 07:56)  Tacrolimus  Serum: 12.0 (18 @ 09:26)      Ca    9.1      29 Mar 2018 07:21  Mg     1.9           PT/INR - ( 29 Mar 2018 07:21 )   PT: 12.5 sec;   INR: 1.15 ratio         PTT - ( 29 Mar 2018 07:21 )  PTT:50.2 sec    .  3/28  s/p heart transplant  PA  52/13/31 wedge: /24, RA  9/ RV 51/15  O2 Sats:  -- AO: 7.6/96/9.92  O2 Sats:  -- Pa: 7.7/59/6.18  O2 Sats:  -- Pa: 7.6/59.3/6.13   CO by Marino: 6.69  Marino cardiac index: 3.57 Subjective: Pt Lying in bed feels well reports ambulating without issue     Medications:  acetaminophen   Tablet. 650 milliGRAM(s) Oral every 6 hours PRN  ALBUTerol/ipratropium for Nebulization 3 milliLiter(s) Nebulizer every 6 hours  aspirin enteric coated 81 milliGRAM(s) Oral daily  atovaquone Suspension 1500 milliGRAM(s) Oral daily  Calcium Citrate + Vit D, 315 mg/200 Unit 2 Tablet(s) 2 Tablet(s) Oral two times a day  diltiazem    milliGRAM(s) Oral daily  docusate sodium 100 milliGRAM(s) Oral three times a day  famotidine    Tablet 20 milliGRAM(s) Oral daily  heparin  Injectable 66040 Unit(s) SubCutaneous <User Schedule>  insulin lispro (HumaLOG) corrective regimen sliding scale   SubCutaneous three times a day before meals  insulin lispro (HumaLOG) corrective regimen sliding scale   SubCutaneous at bedtime  magnesium oxide 400 milliGRAM(s) Oral <User Schedule>  multivitamin 1 Tablet(s) Oral daily  mycophenolate mofetil 1000 milliGRAM(s) Oral <User Schedule>  nystatin    Suspension 581656 Unit(s) Oral every 6 hours  pravastatin 20 milliGRAM(s) Oral at bedtime  predniSONE   Tablet 20 milliGRAM(s) Oral every 12 hours  silver sulfADIAZINE 1% Cream 1 Application(s) Topical two times a day  sodium bicarbonate 650 milliGRAM(s) Oral three times a day  tacrolimus 9 milliGRAM(s) Oral <User Schedule>  valGANciclovir 450 milliGRAM(s) Oral <User Schedule>      PHYSICAL EXAM:  Vital Signs Last 24 Hrs  T(C): 36.6 (29 Mar 2018 07:38), Max: 37 (28 Mar 2018 22:00)  T(F): 97.9 (29 Mar 2018 07:38), Max: 98.6 (28 Mar 2018 22:00)  HR: 101 (29 Mar 2018 07:38) (96 - 107)  BP: 147/96 (29 Mar 2018 07:38) (128/88 - 148/95)  BP(mean): 105 (29 Mar 2018 03:46) (101 - 110)  RR: 18 (29 Mar 2018 07:38) (16 - 20)  SpO2: 96% (29 Mar 2018 07:38) (93% - 97%)  Daily Weight in k (29 Mar 2018 07:40)  I&O's Detail    28 Mar 2018 07:01  -  29 Mar 2018 07:00  --------------------------------------------------------  IN:    Oral Fluid: 1010 mL  Total IN: 1010 mL    OUT:    Voided: 1500 mL  Total OUT: 1500 mL    Total NET: -490 mL          General: A/ox 3, No acute Distress  Neck: Supple, NO JVD  Cardiac: S1 S2, No M/R/G  Pulmonary:  decreased at R base > L Breathing unlabored, No Rhonchi/Rales/Wheezing  Abdomen: Soft, Non -tender, +BS   wound vac on driveline site minimal drainage   Extremities: No Rashes, No edema  Neuro: A/o x 3, No focal deficits  Psch: normal mood , normal affect                            9.4    19.2  )-----------( 434      ( 29 Mar 2018 07:21 )             29.2         142  |  105  |  43<H>  ----------------------------<  103<H>  5.0   |  25  |  1.59<H>    Creatinine, Serum: 1.69 mg/dL (18 @ 07:15)  Creatinine, Serum: 1.82 mg/dL (18 @ 07:19)      Tacrolimus Serum: 10.0 (18 @ 07:56)  Tacrolimus  Serum: 12.0 (18 @ 09:26)      Ca    9.1      29 Mar 2018 07:21  Mg     1.9           PT/INR - ( 29 Mar 2018 07:21 )   PT: 12.5 sec;   INR: 1.15 ratio         PTT - ( 29 Mar 2018 07:21 )  PTT:50.2 sec    .  3/28  s/p heart transplant  PA  52/13/31 wedge: /24, RA  9// RV 51/15  O2 Sats:  -- AO: 7.6/96/9.92  O2 Sats:  -- Pa: 7.7/59/6.18  O2 Sats:  -- Pa: 7.6/59.3/6.13   CO by Marino: 6.69  Marino cardiac index: 3.57

## 2018-03-29 NOTE — PROGRESS NOTE ADULT - ASSESSMENT
Mr. Baez is a 67 year old man with ACC/AHA stage D chronic systolic heart failure due to NICM s/p HM2 LVAD 6/17 c/b pump thrombosis now s/p heart transplant on 2/23 (CMV D-/R+ and Toxo D-/R+), with post-op course c/b primary graft dysfunction, initially thought to be immune-mediated due to positive B-cell flow (negative CDC cross match) and received plasmapharesis/IVIg x2 with improvement in LV function. His course has been complicated by bilateral IJ/subclavian thrombi and pseudomonas infection, which was treated.    Pt presenting today creatinine 1.5( 1.6) Tacro level 10( 12.) on  9 mg of Tacro     3/28 RHC pressures: RA 11 PA 52/13/31, wedge 24, PA Sat % 59.3 , CO/CI 6.6/3.5 Mr. Baez is a 67 year old man with ACC/AHA stage D chronic systolic heart failure due to NICM s/p HM2 LVAD 6/17 c/b pump thrombosis now s/p heart transplant on 2/23 (CMV D-/R+ and Toxo D-/R+), with post-op course c/b primary graft dysfunction, initially thought to be immune-mediated due to positive B-cell flow (negative CDC cross match) and received plasmapharesis/IVIg x2 with improvement in LV function. His course has been complicated by bilateral IJ/subclavian thrombi and pseudomonas infection, which was treated.    Pt presenting today creatinine 1.5( 1.6) Tacro level 10( 12.) on  9 mg of Tacro     3/28 RHC pressures: RA 11 PA 52/13/31, wedge 24, PA Sat % 59.3 , CO/CI 6.6/3.5   appears euvolemic

## 2018-03-29 NOTE — PROGRESS NOTE ADULT - PROBLEM SELECTOR PLAN 2
Continue prednisone taper.  Continue CellCept 1000 mg BID  Tacrolimus level 10( 12 )   Continue Tacro  Mag level 1.9 improved on  Magnesium 400 mg po daily   Continue Heparin SQ   Echo 3/29    on going support and education Continue prednisone taper.  Continue CellCept 1000 mg BID  Tacrolimus level 10( 12 )   Give Tacro  9.5 mg po BID Give extra dose of 0.5 mg tacrolimus this am to= 9.5  Ongoing teaching and reinforcement

## 2018-03-29 NOTE — PROGRESS NOTE ADULT - SUBJECTIVE AND OBJECTIVE BOX
Behavioral Cardiology Progress Note     HPI:  Mr. Baez is a 67 year-old man, , only son . Owns house he lives in with his brother.  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  No PPH.     Current stressors:   Hospitalization   s/p Heart transplant (18)      Support system/family support: Good support from family and close friend      Coping strategies: Talking with family and staff, watching TV, his pastora, talking to his granddaughter     Understanding of medical illness and treatment plan:   Ongoing education on medication management provided by HT coordinator and other team members. Strategies being utilized to accommodate limited literacy (pictures of medication, frequent teach-back).  Good understanding of need for lifelong immunosuppressant medication, importance of calling HT coordinator.  Benefits from frequent review and teach-back of all education.     MSE:  Seen resting in bed. A&Ox3.  Well related with good eye contact. Thought process goal directed. No evidence of any psychosis, delusions, jona.  Mood "pretty good." Affect full range. Insight and judgment adequate.

## 2018-03-29 NOTE — PROGRESS NOTE ADULT - PROBLEM SELECTOR PLAN 7
Cardizem CD increase to 300 mg daily ( monitor tacro levels )  Continue Asa 81 mg po daily   Continue Pravachol 20 mg po daily   Continue Calcium Citrate + Vit D,  twice daily   Continue Mag level 1.9 improved on  Magnesium 400 mg po daily  Continue MVI daily

## 2018-03-29 NOTE — PROGRESS NOTE ADULT - ASSESSMENT
67 year old man with ACC/AHA stage D chronic systolic heart failure due to NICM (LVEF 20%) s/p HM2 LVAD 6/17 c/b pump thrombus now s/p OHT 2/23 (CMV D-/R+ and Toxo D-/R+), with post-op course c/b primary graft dysfunction, initially thought to be immune-mediated due to positive B-cell flow (negative CDC cross match) and received plasmapheresis/IVIG x2 with improvement in LV function. Found to have b/l IJ/subclavian thrombi as well. CrCl 49.    1) Antimicrobial prophylaxis:  Intermediate risk CMV - Valcyte 450mg daily (improving CrCl)  Intermediate risk Toxo/PCP - continue Mepron 1500mg PO daily with food.  Thrush - continue Nystatin S/S 5 mL Qid  HCV+ donor/HCV- recipient; HCV (1a) - New HCV PCR pending    2) Leukocytosis  - Patient has no signs infection at bedside; no fevers, no chills. No focal complaints, feels well. Note history thrombus, prior episode rejection.  - Monitor for now, low threshold to start antibiotics targeting HCAP if worsening clinical status  - Check blood cultures if any new signs sepsis  - CXR stable    Sander Liu MD  Pager [phone]  After 5pm and on weekends call [phone]

## 2018-03-29 NOTE — PROGRESS NOTE ADULT - PROBLEM SELECTOR PLAN 4
Continue subcutaneous heparin given bilateral DVTs.  restart after cardiac biopsy    Ultrasound of upper extremities  with some improvement in some areas and new findings in other  will continue present treatment Discontinue  Heparin SQ   Start Lovenox 80 mg sq BID first dose tonight  Ultrasound of upper extremities  with some improvement in some areas and new findings in other  will continue present treatment

## 2018-03-29 NOTE — PROGRESS NOTE ADULT - PROBLEM SELECTOR PLAN 3
His left kidney with recent sono done shows a small cyst that is showing internal septations. This was present in the past as well. Given on immunosuppresion, would monitor this every few months. Consider getting Urology eval as outpt on discharge with cyst/mass specialist Dr Dane Kiran.

## 2018-03-29 NOTE — CHART NOTE - NSCHARTNOTEFT_GEN_A_CORE
Source: Patient [ x ]    Family [ ]     other [ x ] RN, Comprehensive chart review, heart failure team    Pt seen for nutrition follow up. Reports good appetite and eating 100% of hospital meals. Pt states he has consumed 1 Ensure Enlive thus far today, and has been drinking 2 daily. Pt reports no GI distress and last BM was earlier today.   Pt able to teach-back food/drug interaction (avoid sour oranges, grapefruit, and pomegranate), importance of food safety and hand washing, as well as heart healthy diet guidelines. Pt states his friend Tahira is going to assist with meal preparation and she is aware of nutrition therapy for after heart transplant.     Chart reviewed- s/p heart transplant on 2/23, post-op course c/b primary graft dysfunction and received plasmapharesis/IVIg x2 with improvement in LV function, bilateral IJ/subclavian thrombi, NORMAN- improving; with increased lymphocytes seen on the fourth biopsy, s/p fifth biopsy yesterday.     Diet : regula, low fiber, no concentrated potassium, Ensure Enlive 3 cans/day.      Admission Weight: 78.9kg  Current Weight: 75kg  Weight fluctuations noted, likely due to fluid shifts. Pt with 2+right arm edema.     Pertinent Medications: MEDICATIONS  (STANDING):  ALBUTerol/ipratropium for Nebulization 3 milliLiter(s) Nebulizer every 6 hours  aspirin enteric coated 81 milliGRAM(s) Oral daily  atovaquone Suspension 1500 milliGRAM(s) Oral daily  Calcium Citrate + Vit D, 315 mg/200 Unit 2 Tablet(s) 2 Tablet(s) Oral two times a day  diltiazem    Tablet 60 milliGRAM(s) Oral once  diltiazem    milliGRAM(s) Oral daily  docusate sodium 100 milliGRAM(s) Oral three times a day  famotidine    Tablet 20 milliGRAM(s) Oral daily  heparin  Injectable 00287 Unit(s) SubCutaneous <User Schedule>  insulin lispro (HumaLOG) corrective regimen sliding scale   SubCutaneous three times a day before meals  insulin lispro (HumaLOG) corrective regimen sliding scale   SubCutaneous at bedtime  magnesium oxide 400 milliGRAM(s) Oral <User Schedule>  multivitamin 1 Tablet(s) Oral daily  mycophenolate mofetil 1000 milliGRAM(s) Oral <User Schedule>  nystatin    Suspension 778314 Unit(s) Oral every 6 hours  pravastatin 20 milliGRAM(s) Oral at bedtime  predniSONE   Tablet 20 milliGRAM(s) Oral every 12 hours  silver sulfADIAZINE 1% Cream 1 Application(s) Topical two times a day  sodium bicarbonate 650 milliGRAM(s) Oral three times a day  tacrolimus 0.5 milliGRAM(s) Oral once  tacrolimus 9.5 milliGRAM(s) Oral <User Schedule>  valGANciclovir 450 milliGRAM(s) Oral <User Schedule>    MEDICATIONS  (PRN):  acetaminophen   Tablet. 650 milliGRAM(s) Oral every 6 hours PRN Mild Pain (1 - 3)    Pertinent Labs:    Complete Blood Count + Automated Diff (03.29.18 @ 07:21)    Hemoglobin: 9.4 g/dL - low    Hematocrit: 29.2 % - low  Basic Metabolic Panel - STAT (03.29.18 @ 07:21)    Sodium, Serum: 142 mmol/L    Potassium, Serum: 5.0 mmol/L    Chloride, Serum: 105 mmol/L    Carbon Dioxide, Serum: 25 mmol/L    Anion Gap, Serum: 12 mmol/L    Blood Urea Nitrogen, Serum: 43 mg/dL - high    Creatinine, Serum: 1.59 mg/dL - high    Glucose, Serum: 103 mg/dL - high    Calcium, Total Serum: 9.1 mg/dL  Comprehensive Metabolic Panel (03.27.18 @ 07:19)    Protein Total, Serum: 6.3 g/dL    Albumin, Serum: 3.3 g/dL    Bilirubin Total, Serum: 0.5 mg/dL    Alkaline Phosphatase, Serum: 92 U/L    Aspartate Aminotransferase (AST/SGOT): 20 U/L    Alanine Aminotransferase (ALT/SGPT): 32 U/L     Point of Care Testing BG: (3/29)110, (3/28)100-131.        Skin: No pressure ulcers noted.     Estimated Needs:   [ x ] no change since previous assessment  [ ] recalculated:       Previous Nutrition Diagnosis:   Inadequate Oral Intake and Increased Nutrient Needs - addressed with po diet and supplementation  Food & Nutrition Related Knowledge Deficit - Being addressed via continued education and follow-up reinforcement during stay       New Nutrition Diagnosis: [ x ] not applicable    Interventions:   1. Encourage PO intake with all meals.  2. Obtained food preferences, will provide as able.  3. Encourage intake of 2-3 Ensure Enlive daily.  4. Reviewed potassium diet restriction.  5. Reviewed nutrition therapy for after heart transplant.  6. RD to follow up with further diet instruction as accepted by pt.          Monitoring and Evaluation:     [ x ] PO intake [ x ] Tolerance to diet prescription [ x ] weights [ x ] follow up per protocol    [ ] other:

## 2018-03-29 NOTE — PROGRESS NOTE ADULT - SUBJECTIVE AND OBJECTIVE BOX
VITAL SIGNS-Tele:       Vital Signs Last 24 Hrs  T(C): 36.7 (18 @ 11:17), Max: 37 (18 @ 22:00)  HR: 102 (18 @ 11:17) (96 - 107)  BP: 127/77 (18 @ 11:17) (127/77 - 148/95)  SpO2: 96% (18 @ 11:17) (93% - 97%)          @ 07:01  -   @ 07:00  --------------------------------------------------------  IN: 1010 mL / OUT: 1500 mL / NET: -490 mL     @ 07:01  -   @ 11:40  --------------------------------------------------------  IN: 420 mL / OUT: 400 mL / NET: 20 mL    Daily     Daily Weight in k (29 Mar 2018 07:40)    CAPILLARY BLOOD GLUCOSE  POCT Blood Glucose.: 154 mg/dL (29 Mar 2018 11:12)  POCT Blood Glucose.: 110 mg/dL (29 Mar 2018 07:10)  POCT Blood Glucose.: 125 mg/dL (28 Mar 2018 21:37)  POCT Blood Glucose.: 131 mg/dL (28 Mar 2018 18:18)        PHYSICAL EXAM:  Neurology: alert and oriented x 3, nonfocal, no gross deficits  CV : typical LVAD sound  Sternal Wound :  CDI , Stable  Lungs: diminished BS RLL  Abdomen: soft, nontender, nondistended, positive bowel sounds, last bowel movement 3/28- VAC in place - CDI        Extremities:     no edema no calf tenderness    acetaminophen   Tablet. 650 milliGRAM(s) Oral every 6 hours PRN  ALBUTerol/ipratropium for Nebulization 3 milliLiter(s) Nebulizer every 6 hours  aspirin enteric coated 81 milliGRAM(s) Oral daily  atovaquone Suspension 1500 milliGRAM(s) Oral daily  Calcium Citrate + Vit D, 315 mg/200 Unit 2 Tablet(s) 2 Tablet(s) Oral two times a day  diltiazem    Tablet 60 milliGRAM(s) Oral once  diltiazem    milliGRAM(s) Oral daily  docusate sodium 100 milliGRAM(s) Oral three times a day  famotidine    Tablet 20 milliGRAM(s) Oral daily  heparin  Injectable 50197 Unit(s) SubCutaneous <User Schedule>  insulin lispro (HumaLOG) corrective regimen sliding scale   SubCutaneous three times a day before meals  insulin lispro (HumaLOG) corrective regimen sliding scale   SubCutaneous at bedtime  magnesium oxide 400 milliGRAM(s) Oral <User Schedule>  multivitamin 1 Tablet(s) Oral daily  mycophenolate mofetil 1000 milliGRAM(s) Oral <User Schedule>  nystatin    Suspension 287321 Unit(s) Oral every 6 hours  pravastatin 20 milliGRAM(s) Oral at bedtime  predniSONE   Tablet 20 milliGRAM(s) Oral every 12 hours  silver sulfADIAZINE 1% Cream 1 Application(s) Topical two times a day  sodium bicarbonate 650 milliGRAM(s) Oral three times a day  tacrolimus 0.5 milliGRAM(s) Oral once  tacrolimus 9.5 milliGRAM(s) Oral <User Schedule>  valGANciclovir 450 milliGRAM(s) Oral <User Schedule>      Physical Therapy Rec:   Home  [  ]   Home w/ PT  [  x]  Rehab  [  ]  Discussed with Cardiothoracic Team at AM rounds.

## 2018-03-29 NOTE — PROGRESS NOTE ADULT - ASSESSMENT
Mr. Baez is a 67 year old man with ACC/AHA stage D chronic systolic heart failure due to NICM s/p HM2 LVAD 6/17 c/b pump thrombosis now s/p heart transplant on 2/23 (CMV D-/R+ and Toxo D-/R+), with post-op course c/b primary graft dysfunction, initially thought to be immune-mediated due to positive B-cell flow (negative CDC cross match) and received plasmapharesis/IVIg x2 with improvement in LV function. His course has been complicated by bilateral IJ/subclavian thrombi. He currently has acute renal failure of unclear etiology. However, he does not have clear evidence of allograft dysfunction or low output on exam. 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     # Immunosuppression:  Tacrolimus -  Will continue dosing at 9 mg BID (increased yesterday). Tacro level 7 this morning from 9.4 Target trough level of 12-14. - - cardizem  240 qd  CellCept - continue 1000 mg PO BID.   Steroids - will give short pulse of oral steroids. Will give 50 mg PO BID for three days with subsequent taper.  Taper starting 3/24 45 mg x2 the 40 x2  decrease by 10  until 15 mg bid- ordered    #Hypertension  - Increase  cardizem 240 mg daily.   	  # Antimicrobial prophylaxis:  Intermediate risk CMV - Valcyte for NORMAN to 450 mg daily just TWICE weekly.   Intermediate risk Toxo - continue Mepron 1500mg PO daily with food.  PCP - continue Mepron 1500mg PO daily  Thrush - continue Nystatin S/S 5 mL QID    # Bilateral IJ/Subclavian thrombi - unchanged on f/u U/S 3/12  -  heparin 13,000 units subcutaneous Q12 (250 units/kg Q12) per vasc cards recs in setting of renal function.  PTT q 6 hrs after administration dosed by Dr Tavarez  - CHERIE negative  Plan for Venous duplex UE on monday per Dr Tavarez    #Acute renal failure, possibly related to vancomycin dosing, improving.   - Vanco stopped.   - He responded to diuretics given on Friday. No further diuresis.     # CAV PPx  - Continue ECASA 81mg PO daily  - Continue pravastatin 20mg PO QHS    # ID   - Pseudomonas in sputum - course of cefepime completed 3/15  - DLES improved and vancomycin stopped given high trough with NORMAN. Per ID, will redose with daptomycin depending upon repeat vanc level this morning.   - silver sulfadizaine dressings per plastics for R 3rd digit.   - HCV viral load and PCR per protocol. Therapy to be initiated as outpatient by Dr. Thomas (Hepatology)  - Transplant ID following    #endo  - BG currently well controlled, ranging 103-157  - SSI AC & HS    # Additional prevention:  - Citracal + D 2 tabs PO BID  - multivitamin 1 tab daily  - Stress ulcer ppx while on steroids: will change pantoprazole to ranitidine per renal recommendations.  - Mag oxide discontinued currently. Mag 1.9  this AM.    #dispo  - Ongoing work with PT for ongoing exercise tolerance prior to d/c    plan d/c home early next week.    3/26 Pt for repeat UE thrombus as per HF, r/o propagation  Creat is improving may need medications adjustment  Ambulate  3/27 Biopsy now scheduled for Wednesday. April 4    UE duplex with new innominate vein partial thrombus, cont anticoagulation, now therapeutic. D/w Dr Tavarez  NPO p Mn for biopsy, hold heparin tonight.  Prograf level down , to d/w HF  D/c planning   3/28 prograf increased to 9mg bid.  Renal function continues to improve  Will resume hep sq tonight after biopsy this am  3/29 - Prograf 0.5mg po x 1 stat & increase Prograf to 9.5mg po bid  increase CardizemCD to 300mg po daily - received CardizemCD 240 this am therefore Cardizem 60mg po x 1 given this am  CXR w/ RLL haziness - will ultrasound  R LL today  d/c planning

## 2018-03-29 NOTE — PROGRESS NOTE ADULT - ASSESSMENT
Able to teach back some information on HT education including need for lifelong immunosuppressant medication (able to recall Prograf, Cellcept, and "steroid" but couldn't recall the name Prednisone). His close friend Tahira will be coming by each day to assist with medication management.  Understands need to stay in close contact with HT coordinator and has agreed to call each morning to "check in."  Slept well last night.  Appetite improving every day. Mood "pretty good."  Education provided on normal range of emotions following HT/steroids. Open to talking about his feelings related to transplant which he finds helpful.  Denies symptoms of anxiety or depression. Receptive to support and validation.     DX: Adjustment disorder       Recommendations:   Behavioral Cardiology will continue to follow as needed.

## 2018-03-29 NOTE — PROGRESS NOTE ADULT - ATTENDING COMMENTS
Doing well. Ambulating. Prograf level 10 (reduced from 12). Increased prograf to 9.5/9.5; likely will be on 9/10 on discharge. Increase dilt to 300 mg daily for HTN. Repeat HCV PCR pending. Ensure appt with hepatology for HCV treatment. Also will need appt with urology (Dane Kiran) given septated renal cyst (which was present before). Tentative discharge Tuesday.

## 2018-03-29 NOTE — PROGRESS NOTE ADULT - SUBJECTIVE AND OBJECTIVE BOX
[facility] DIVISION OF KIDNEY DISEASES AND HYPERTENSION -- FOLLOW UP NOTE  --------------------------------------------------------------------------------  Chief Complaint:  NORMAN    24 hour events/subjective:  feels well      PAST HISTORY  --------------------------------------------------------------------------------  No significant changes to PMH, PSH, FHx, SHx, unless otherwise noted    ALLERGIES & MEDICATIONS  --------------------------------------------------------------------------------  Allergies    No Known Allergies    Intolerances      Standing Inpatient Medications  ALBUTerol/ipratropium for Nebulization 3 milliLiter(s) Nebulizer every 6 hours  aspirin enteric coated 81 milliGRAM(s) Oral daily  atovaquone Suspension 1500 milliGRAM(s) Oral daily  Calcium Citrate + Vit D, 315 mg/200 Unit 2 Tablet(s) 2 Tablet(s) Oral two times a day  diltiazem    milliGRAM(s) Oral daily  docusate sodium 100 milliGRAM(s) Oral three times a day  famotidine    Tablet 20 milliGRAM(s) Oral daily  heparin  Injectable 19162 Unit(s) SubCutaneous <User Schedule>  insulin lispro (HumaLOG) corrective regimen sliding scale   SubCutaneous three times a day before meals  insulin lispro (HumaLOG) corrective regimen sliding scale   SubCutaneous at bedtime  magnesium oxide 400 milliGRAM(s) Oral <User Schedule>  multivitamin 1 Tablet(s) Oral daily  mycophenolate mofetil 1000 milliGRAM(s) Oral <User Schedule>  nystatin    Suspension 989926 Unit(s) Oral every 6 hours  pravastatin 20 milliGRAM(s) Oral at bedtime  predniSONE   Tablet 20 milliGRAM(s) Oral every 12 hours  silver sulfADIAZINE 1% Cream 1 Application(s) Topical two times a day  sodium bicarbonate 650 milliGRAM(s) Oral three times a day  tacrolimus 9 milliGRAM(s) Oral <User Schedule>  valGANciclovir 450 milliGRAM(s) Oral <User Schedule>    PRN Inpatient Medications  acetaminophen   Tablet. 650 milliGRAM(s) Oral every 6 hours PRN      REVIEW OF SYSTEMS  --------------------------------------------------------------------------------  Gen: No weight changes, fatigue, fevers/chills, weakness  Skin: No rashes  Head/Eyes/Ears/Mouth: No headache; Normal hearing; Normal vision w/o blurriness; No sinus pain/discomfort, sore throat  Respiratory: No dyspnea, cough, wheezing, hemoptysis  CV: No chest pain, PND, orthopnea  GI: No abdominal pain, diarrhea, constipation, nausea, vomiting, melena, hematochezia  : No increased frequency, dysuria, hematuria, nocturia  MSK: No joint pain/swelling; no back pain; no edema  Neuro: No dizziness/lightheadedness, weakness, seizures, numbness, tingling      VITALS/PHYSICAL EXAM  --------------------------------------------------------------------------------  T(C): 36.6 (03-29-18 @ 07:38), Max: 37 (03-28-18 @ 22:00)  HR: 101 (03-29-18 @ 07:38) (96 - 107)  BP: 147/96 (03-29-18 @ 07:38) (128/88 - 148/95)  RR: 18 (03-29-18 @ 07:38) (16 - 20)  SpO2: 96% (03-29-18 @ 07:38) (93% - 97%)  Wt(kg): --        03-28-18 @ 07:01  -  03-29-18 @ 07:00  --------------------------------------------------------  IN: 1010 mL / OUT: 1500 mL / NET: -490 mL    03-29-18 @ 07:01  -  03-29-18 @ 09:56  --------------------------------------------------------  IN: 420 mL / OUT: 0 mL / NET: 420 mL      Physical Exam:  	Gen: NAD, well-appearing  	HEENT: PERRL, supple neck, clear oropharynx  	Pulm: CTA B/L  	CV: RRR, S1S2; no rub  	Abd: +BS, soft, nontender/nondistended  	UE: Warm, FROM, no clubbing, intact strength; no edema; no asterixis  	LE: Warm, FROM, no clubbing, intact strength; no edema  	    LABS/STUDIES  --------------------------------------------------------------------------------              9.4    19.2  >-----------<  434      [03-29-18 @ 07:21]              29.2     142  |  105  |  43  ----------------------------<  103      [03-29-18 @ 07:21]  5.0   |  25  |  1.59        Ca     9.1     [03-29-18 @ 07:21]      Mg     1.9     [03-29-18 @ 07:21]      PT/INR: PT 12.5 , INR 1.15       [03-29-18 @ 07:21]  PTT: 50.2       [03-29-18 @ 07:21]      Creatinine Trend:  SCr 1.59 [03-29 @ 07:21]  SCr 1.69 [03-28 @ 07:15]  SCr 1.82 [03-27 @ 07:19]  SCr 2.14 [03-26 @ 07:15]  SCr 2.23 [03-25 @ 07:32]    Urinalysis - [03-23-18 @ 16:03]      Color Yellow / Appearance Slightly Turbid / SG 1.020 / pH 5.0      Gluc Negative / Ketone Negative  / Bili Negative / Urobili Negative       Blood Small / Protein 30 / Leuk Est Small / Nitrite Negative      RBC 5 / WBC 8 / Hyaline 0 / Gran  / Sq Epi  / Non Sq Epi 4 / Bacteria Few      Iron 22, TIBC 182, %sat 12      [02-13-18 @ 12:44]  Ferritin 79.0      [02-13-18 @ 12:44]  HbA1c 5.3      [03-18-18 @ 11:20]  TSH 1.48      [02-27-18 @ 08:13]  Lipid: chol 62, TG 33, HDL 17, LDL 38      [06-07-17 @ 06:14]      C3 Complement 135      [03-19-18 @ 15:13]  C4 Complement 37      [03-19-18 @ 15:13]  Free Light Chains: kappa 4.36, lambda 5.71, ratio = 0.76      [03-19 @ 15:13]  Immunofixation Serum:   No Monoclonal Band Identified      [03-19-18 @ 15:13]  Cryoglobulin: Negative      [03-19-18 @ 15:13]

## 2018-03-29 NOTE — PROGRESS NOTE ADULT - PROBLEM SELECTOR PLAN 1
EMB #1: 1R/1A, pAMR 1. DSA negative.  EMB #2: 1R/1A, pAMR 1. DSA negative.  EMB #3: 1R/1A, pAMR1. No DSA. RA 12, PA 40/16/27, PCW 11, PA 67%, CO/CI 6.8/3.6. TTE with mild RV enlargement and dysfunction.  EMB #4: 1R/2, pAMR1. RA 10, PA 39/18, Wedge 18, PA SAT 58.2 CO/CI 6/3.2  EMB #5                      RA 11 PA 52/13,   wedge 24, PA Sat 59.3 , CO/CI 6.6/3.5    DSA sent  3/28 at time of next biopsy.  Cardiac biopsy 3/28 am   Heprain restarted EMB #1: 1R/1A, pAMR 1. DSA negative.  EMB #2: 1R/1A, pAMR 1. DSA negative.  EMB #3: 1R/1A, pAMR1. No DSA. RA 12, PA 40/16/27, PCW 11, PA 67%, CO/CI 6.8/3.6. TTE with mild RV enlargement and dysfunction.  EMB #4: 1R/2, pAMR1. RA 10, PA 39/18, Wedge 18, PA SAT 58.2 CO/CI 6/3.2  EMB #5   0R/  AMR 1   RA11 PA 52/13,   wedge 24, PA Sat 59.3 , CO/CI 6.6/3.5    DSA sent  3/28 at time of next biopsy.  Cardiac biopsy 3/28 am   Heprain restarted EMB #1: 1R/1A, pAMR 1. DSA negative.  EMB #2: 1R/1A, pAMR 1. DSA negative.  EMB #3: 1R/1A, pAMR1. No DSA. RA 12, PA 40/16/27, PCW 11, PA 67%, CO/CI 6.8/3.6. TTE with mild RV enlargement and dysfunction.  EMB #4: 1R/2, pAMR1. RA 10, PA 39/18, wedge 18  Pat Sat %  59.2  Co/CI 6/3.2   EMB # 5                      RA 11 PA 52/13/, wedge 24, PA Sat % 59.3 ,CO/CI 6.6/3.5

## 2018-03-30 LAB
ALBUMIN SERPL ELPH-MCNC: 3.3 G/DL — SIGNIFICANT CHANGE UP (ref 3.3–5)
ALP SERPL-CCNC: 91 U/L — SIGNIFICANT CHANGE UP (ref 40–120)
ALT FLD-CCNC: 51 U/L RC — HIGH (ref 10–45)
ANION GAP SERPL CALC-SCNC: 10 MMOL/L — SIGNIFICANT CHANGE UP (ref 5–17)
ANION GAP SERPL CALC-SCNC: 12 MMOL/L — SIGNIFICANT CHANGE UP (ref 5–17)
AST SERPL-CCNC: 28 U/L — SIGNIFICANT CHANGE UP (ref 10–40)
BILIRUB SERPL-MCNC: 0.6 MG/DL — SIGNIFICANT CHANGE UP (ref 0.2–1.2)
BUN SERPL-MCNC: 38 MG/DL — HIGH (ref 7–23)
BUN SERPL-MCNC: 40 MG/DL — HIGH (ref 7–23)
CALCIUM SERPL-MCNC: 8.9 MG/DL — SIGNIFICANT CHANGE UP (ref 8.4–10.5)
CALCIUM SERPL-MCNC: 8.9 MG/DL — SIGNIFICANT CHANGE UP (ref 8.4–10.5)
CHLORIDE SERPL-SCNC: 107 MMOL/L — SIGNIFICANT CHANGE UP (ref 96–108)
CHLORIDE SERPL-SCNC: 108 MMOL/L — SIGNIFICANT CHANGE UP (ref 96–108)
CO2 SERPL-SCNC: 24 MMOL/L — SIGNIFICANT CHANGE UP (ref 22–31)
CO2 SERPL-SCNC: 25 MMOL/L — SIGNIFICANT CHANGE UP (ref 22–31)
CREAT SERPL-MCNC: 1.5 MG/DL — HIGH (ref 0.5–1.3)
CREAT SERPL-MCNC: 1.63 MG/DL — HIGH (ref 0.5–1.3)
CULTURE RESULTS: NO GROWTH — SIGNIFICANT CHANGE UP
GLUCOSE BLDC GLUCOMTR-MCNC: 103 MG/DL — HIGH (ref 70–99)
GLUCOSE BLDC GLUCOMTR-MCNC: 119 MG/DL — HIGH (ref 70–99)
GLUCOSE BLDC GLUCOMTR-MCNC: 174 MG/DL — HIGH (ref 70–99)
GLUCOSE BLDC GLUCOMTR-MCNC: 95 MG/DL — SIGNIFICANT CHANGE UP (ref 70–99)
GLUCOSE SERPL-MCNC: 125 MG/DL — HIGH (ref 70–99)
GLUCOSE SERPL-MCNC: 97 MG/DL — SIGNIFICANT CHANGE UP (ref 70–99)
HCT VFR BLD CALC: 27.7 % — LOW (ref 39–50)
HGB BLD-MCNC: 8.8 G/DL — LOW (ref 13–17)
MAGNESIUM SERPL-MCNC: 1.7 MG/DL — SIGNIFICANT CHANGE UP (ref 1.6–2.6)
MCHC RBC-ENTMCNC: 30.3 PG — SIGNIFICANT CHANGE UP (ref 27–34)
MCHC RBC-ENTMCNC: 31.9 GM/DL — LOW (ref 32–36)
MCV RBC AUTO: 95.1 FL — SIGNIFICANT CHANGE UP (ref 80–100)
PLATELET # BLD AUTO: 387 K/UL — SIGNIFICANT CHANGE UP (ref 150–400)
POTASSIUM SERPL-MCNC: 4.7 MMOL/L — SIGNIFICANT CHANGE UP (ref 3.5–5.3)
POTASSIUM SERPL-MCNC: 5.5 MMOL/L — HIGH (ref 3.5–5.3)
POTASSIUM SERPL-SCNC: 4.7 MMOL/L — SIGNIFICANT CHANGE UP (ref 3.5–5.3)
POTASSIUM SERPL-SCNC: 5.5 MMOL/L — HIGH (ref 3.5–5.3)
PROT SERPL-MCNC: 6.2 G/DL — SIGNIFICANT CHANGE UP (ref 6–8.3)
RBC # BLD: 2.91 M/UL — LOW (ref 4.2–5.8)
RBC # FLD: 19.7 % — HIGH (ref 10.3–14.5)
SODIUM SERPL-SCNC: 143 MMOL/L — SIGNIFICANT CHANGE UP (ref 135–145)
SODIUM SERPL-SCNC: 143 MMOL/L — SIGNIFICANT CHANGE UP (ref 135–145)
SPECIMEN SOURCE: SIGNIFICANT CHANGE UP
TACROLIMUS SERPL-MCNC: 10.3 NG/ML — SIGNIFICANT CHANGE UP
WBC # BLD: 20.3 K/UL — HIGH (ref 3.8–10.5)
WBC # FLD AUTO: 20.3 K/UL — HIGH (ref 3.8–10.5)

## 2018-03-30 PROCEDURE — 71045 X-RAY EXAM CHEST 1 VIEW: CPT | Mod: 26

## 2018-03-30 PROCEDURE — 99233 SBSQ HOSP IP/OBS HIGH 50: CPT

## 2018-03-30 PROCEDURE — 99232 SBSQ HOSP IP/OBS MODERATE 35: CPT

## 2018-03-30 RX ORDER — SODIUM POLYSTYRENE SULFONATE 4.1 MEQ/G
30 POWDER, FOR SUSPENSION ORAL ONCE
Qty: 0 | Refills: 0 | Status: COMPLETED | OUTPATIENT
Start: 2018-03-30 | End: 2018-03-30

## 2018-03-30 RX ADMIN — Medication 650 MILLIGRAM(S): at 21:45

## 2018-03-30 RX ADMIN — FAMOTIDINE 20 MILLIGRAM(S): 10 INJECTION INTRAVENOUS at 12:21

## 2018-03-30 RX ADMIN — SODIUM POLYSTYRENE SULFONATE 30 GRAM(S): 4.1 POWDER, FOR SUSPENSION ORAL at 11:23

## 2018-03-30 RX ADMIN — MAGNESIUM OXIDE 400 MG ORAL TABLET 400 MILLIGRAM(S): 241.3 TABLET ORAL at 08:01

## 2018-03-30 RX ADMIN — MAGNESIUM OXIDE 400 MG ORAL TABLET 400 MILLIGRAM(S): 241.3 TABLET ORAL at 20:02

## 2018-03-30 RX ADMIN — Medication 81 MILLIGRAM(S): at 12:21

## 2018-03-30 RX ADMIN — TACROLIMUS 9 MILLIGRAM(S): 5 CAPSULE ORAL at 20:02

## 2018-03-30 RX ADMIN — Medication 500000 UNIT(S): at 17:55

## 2018-03-30 RX ADMIN — VALGANCICLOVIR 450 MILLIGRAM(S): 450 TABLET, FILM COATED ORAL at 12:21

## 2018-03-30 RX ADMIN — Medication 650 MILLIGRAM(S): at 13:03

## 2018-03-30 RX ADMIN — MYCOPHENOLATE MOFETIL 1000 MILLIGRAM(S): 250 CAPSULE ORAL at 20:02

## 2018-03-30 RX ADMIN — Medication 650 MILLIGRAM(S): at 06:44

## 2018-03-30 RX ADMIN — Medication 500000 UNIT(S): at 06:38

## 2018-03-30 RX ADMIN — Medication 1 APPLICATION(S): at 08:57

## 2018-03-30 RX ADMIN — Medication 15 MILLIGRAM(S): at 17:55

## 2018-03-30 RX ADMIN — Medication 3 MILLILITER(S): at 06:38

## 2018-03-30 RX ADMIN — Medication 3 MILLILITER(S): at 12:20

## 2018-03-30 RX ADMIN — Medication 20 MILLIGRAM(S): at 21:45

## 2018-03-30 RX ADMIN — Medication 1 TABLET(S): at 12:21

## 2018-03-30 RX ADMIN — Medication 3 MILLILITER(S): at 17:55

## 2018-03-30 RX ADMIN — Medication 100 MILLIGRAM(S): at 06:38

## 2018-03-30 RX ADMIN — Medication 500000 UNIT(S): at 12:20

## 2018-03-30 RX ADMIN — Medication 20 MILLIGRAM(S): at 06:38

## 2018-03-30 RX ADMIN — ENOXAPARIN SODIUM 80 MILLIGRAM(S): 100 INJECTION SUBCUTANEOUS at 08:57

## 2018-03-30 RX ADMIN — TACROLIMUS 10 MILLIGRAM(S): 5 CAPSULE ORAL at 08:01

## 2018-03-30 RX ADMIN — MYCOPHENOLATE MOFETIL 1000 MILLIGRAM(S): 250 CAPSULE ORAL at 08:00

## 2018-03-30 RX ADMIN — ATOVAQUONE 1500 MILLIGRAM(S): 750 SUSPENSION ORAL at 12:20

## 2018-03-30 RX ADMIN — Medication 300 MILLIGRAM(S): at 06:38

## 2018-03-30 RX ADMIN — Medication 1 APPLICATION(S): at 20:05

## 2018-03-30 RX ADMIN — ENOXAPARIN SODIUM 80 MILLIGRAM(S): 100 INJECTION SUBCUTANEOUS at 17:55

## 2018-03-30 NOTE — PROGRESS NOTE ADULT - PROBLEM SELECTOR PLAN 2
CNI induced likely given tacro levels are being increased. This is form of RTA( usually distal). Acidosis better on bicarb. Can give one dose of kayxalate. Consider some gentle hydration to allow for improvement of renal function and increased distal delivery of K.  Also, check renin and raffi level as part of CNI or even lovenox induced hyperkalemia is a hyporenin hypoaldo state. If so, can benefit from florinef( low dose) if his filling pressures allow. Also consider changing dilt to procardia for counteract the vasoconstriction of CNI on renal vessels better and perhaps can use anti fungals to increase tacro levels if needed

## 2018-03-30 NOTE — PROGRESS NOTE ADULT - ASSESSMENT
Mr. Baez is a 67 year old man with ACC/AHA stage D chronic systolic heart failure due to NICM s/p HM2 LVAD 6/17 c/b pump thrombosis now s/p heart transplant on 2/23 (CMV D-/R+ and Toxo D-/R+), with post-op course c/b primary graft dysfunction, initially thought to be immune-mediated due to positive B-cell flow (negative CDC cross match) and received plasmapharesis/IVIg x2 with improvement in LV function. His course has been complicated by bilateral IJ/subclavian thrombi. He currently has acute renal failure of unclear etiology. However, he does not have clear evidence of allograft dysfunction or low output on exam. 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     # Immunosuppression:  Tacrolimus -  Will continue dosing at 9 mg BID (increased yesterday). Tacro level 7 this morning from 9.4 Target trough level of 12-14. - - cardizem  240 qd  CellCept - continue 1000 mg PO BID.   Steroids - will give short pulse of oral steroids. Will give 50 mg PO BID for three days with subsequent taper.  Taper starting 3/24 45 mg x2 the 40 x2  decrease by 10  until 15 mg bid- ordered    #Hypertension  - Increase  cardizem 240 mg daily.   	  # Antimicrobial prophylaxis:  Intermediate risk CMV - Valcyte for NORMAN to 450 mg daily just TWICE weekly.   Intermediate risk Toxo - continue Mepron 1500mg PO daily with food.  PCP - continue Mepron 1500mg PO daily  Thrush - continue Nystatin S/S 5 mL QID    # Bilateral IJ/Subclavian thrombi - unchanged on f/u U/S 3/12  -  heparin 13,000 units subcutaneous Q12 (250 units/kg Q12) per vasc cards recs in setting of renal function.  PTT q 6 hrs after administration dosed by Dr Tavarez  - CHERIE negative  Plan for Venous duplex UE on monday per Dr Tavarez    #Acute renal failure, possibly related to vancomycin dosing, improving.   - Vanco stopped.   - He responded to diuretics given on Friday. No further diuresis.     # CAV PPx  - Continue ECASA 81mg PO daily  - Continue pravastatin 20mg PO QHS    # ID   - Pseudomonas in sputum - course of cefepime completed 3/15  - DLES improved and vancomycin stopped given high trough with NORMAN. Per ID, will redose with daptomycin depending upon repeat vanc level this morning.   - silver sulfadizaine dressings per plastics for R 3rd digit.   - HCV viral load and PCR per protocol. Therapy to be initiated as outpatient by Dr. Thomas (Hepatology)  - Transplant ID following    #endo  - BG currently well controlled, ranging 103-157  - SSI AC & HS    # Additional prevention:  - Citracal + D 2 tabs PO BID  - multivitamin 1 tab daily  - Stress ulcer ppx while on steroids: will change pantoprazole to ranitidine per renal recommendations.  - Mag oxide discontinued currently. Mag 1.9  this AM.    #dispo  - Ongoing work with PT for ongoing exercise tolerance prior to d/c    plan d/c home early next week.    3/26 Pt for repeat UE thrombus as per HF, r/o propagation  Creat is improving may need medications adjustment  Ambulate  3/27 Biopsy now scheduled for Wednesday. April 4    UE duplex with new innominate vein partial thrombus, cont anticoagulation, now therapeutic. D/w Dr Tavarez  NPO p Mn for biopsy, hold heparin tonight.  Prograf level down , to d/w HF  D/c planning   3/28 prograf increased to 9mg bid.  Renal function continues to improve  Will resume hep sq tonight after biopsy this am  3/29 - Prograf 0.5mg po x 1 stat & increase Prograf to 9.5mg po bid  increase CardizemCD to 300mg po daily - received CardizemCD 240 this am therefore Cardizem 60mg po x 1 given this am  CXR w/ RLL haziness - will ultrasound  R LL today  d/c planning  36/30 Hyperkalemia - kayexalate 30 x1  Na bicarb added  Creat improved, now on lovenox  Cultures yesterday for leukocytosis

## 2018-03-30 NOTE — PROGRESS NOTE ADULT - ASSESSMENT
Mr. Baez is a 67 year old man with ACC/AHA stage D chronic systolic heart failure due to NICM s/p HM2 LVAD 6/17 c/b pump thrombosis now s/p heart transplant on 2/23 (CMV D-/R+ and Toxo D-/R+), with post-op course c/b primary graft dysfunction, initially thought to be immune-mediated due to positive B-cell flow (negative CDC cross match) and received plasmapharesis/IVIg x2 with improvement in LV function. His course has been complicated by bilateral IJ/subclavian thrombi and pseudomonas infection, which was treated.    3/28 RHC pressures: RA 11 PA 52/13/31, wedge 24, PA Sat % 59.3 , CO/CI 6.6/3.5.    Currently doing well. He currently appears euvolemic with improving exercise tolerance. Normotensive with stable renal function. Therapeutic tacro level. Blood glucose well controlled despite steroids. Mr. Baez is a 67 year old man with ACC/AHA stage D chronic systolic heart failure due to NICM s/p HM2 LVAD 6/17 c/b pump thrombosis now s/p heart transplant on 2/23 (CMV D-/R+ and Toxo D-/R+), with post-op course c/b primary graft dysfunction, initially thought to be immune-mediated due to positive B-cell flow (negative CDC cross match) and received plasmapharesis/IVIg x2 with improvement in LV function. His course has been complicated by bilateral IJ/subclavian thrombi and pseudomonas infection, which was treated.    3/28 RHC pressures: RA 11 PA 52/13/31, wedge 24, PA Sat % 59.3 , CO/CI 6.6/3.5.    Currently doing well. He currently appears euvolemic with improving exercise tolerance. Normotensive with stable renal function. Tacro level slightly low but uptrending. Blood glucose well controlled despite steroids.

## 2018-03-30 NOTE — PROGRESS NOTE ADULT - PROBLEM SELECTOR PLAN 1
EMB #1: 1R/1A, pAMR 1. DSA negative.  EMB #2: 1R/1A, pAMR 1. DSA negative.  EMB #3: 1R/1A, pAMR1. No DSA. RA 12, PA 40/16/27, PCW 11, PA 67%, CO/CI 6.8/3.6. TTE with mild RV enlargement and dysfunction.  EMB #4: 1R/2, pAMR1. RA 10, PA 39/18, wedge 18  Pat Sat %  59.2  Co/CI 6/3.2   EMB # 5  RA 11 PA 52/13/, wedge 24, PA Sat % 59.3 ,CO/CI 6.6/3.5  Planned for next RHC/EMB on Wed 4/11.

## 2018-03-30 NOTE — PROGRESS NOTE ADULT - ASSESSMENT
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 Renal reconsulted for elevated cr. It seems cr has been stable at 0.9 - 1.1 but on 3/14 it was 1.2 and rakan to mid 3 and now downtrending and stable at 1.6mg/dl. Most likely this was vancomycin induced cast nephropathy that is resolving in setting of perhaps some component of vasoconstriction from tacrolimus. Last few days, most active active issues related to hyperkalemia.

## 2018-03-30 NOTE — PROGRESS NOTE ADULT - SUBJECTIVE AND OBJECTIVE BOX
CC: F/U Leukocytosis    Saw/spoke to patient. No fevers, no chills. Feels well. No new complaints.    Allergies  No Known Allergies    ANTIMICROBIALS:  atovaquone Suspension 1500 daily  nystatin    Suspension [id number] every 6 hours  valGANciclovir 450 daily    PE:    Vital Signs Last 24 Hrs  T(C): 36.7 (30 Mar 2018 16:03), Max: 37.1 (29 Mar 2018 19:43)  T(F): 98.1 (30 Mar 2018 16:03), Max: 98.7 (29 Mar 2018 19:43)  HR: 104 (30 Mar 2018 16:03) (97 - 106)  BP: 124/79 (30 Mar 2018 16:03) (120/80 - 140/88)  BP(mean): --  RR: 18 (30 Mar 2018 16:03) (18 - 18)  SpO2: 96% (30 Mar 2018 16:03) (96% - 97%)    Gen: AOx3, NAD, non-toxic, pleasant, well appearing  CV: S1+S2 normal, no murmurs, low tachycardia  Resp: Clear bilat, no resp distress, no crackles/wheezes  Abd: Soft, nontender, +BS, wounds unchanged, stable  Ext: No LE edema, no wounds    LABS:                        8.8    20.3  )-----------( 387      ( 30 Mar 2018 07:08 )             27.7     03-30    143  |  108  |  38<H>  ----------------------------<  125<H>  4.7   |  25  |  1.50<H>    Ca    8.9      30 Mar 2018 15:13  Mg     1.7     03-30    TPro  6.2  /  Alb  3.3  /  TBili  0.6  /  DBili  x   /  AST  28  /  ALT  51<H>  /  AlkPhos  91  03-30    MICROBIOLOGY:    .Blood Blood-Peripheral  03-29-18   No growth to date.     .Sputum Sputum  03-14-18   Rare Pseudomonas aeruginosa (Carbapenem Resistant)  Normal Respiratory Heaven present  --  Pseudomonas aeruginosa (Carbapenem Resistant)      .Sputum Sputum  03-11-18   Few Pseudomonas aeruginosa (Carbapenem Resistant)  Normal Respiratory [place] present  --  Pseudomonas aeruginosa (Carbapenem Resistant)    .Urine Clean Catch (Midstream)  03-10-18   No growth      .Blood Blood  03-09-18   No growth at 5 days.     .Blood Blood  03-09-18   No growth at 5 days.      Skin LVAD driveline site  03-05-18   No growth at 48 hours     .Sputum Sputum  03-02-18   Few Pseudomonas aeruginosa (Carbapenem Resistant)  Normal Respiratory [place] present  --  Pseudomonas aeruginosa (Carbapenem Resistant)    (otherwise reviewed)    RADIOLOGY:    3/30 CXR:    FINDINGS: Status post median sternotomy. Right pleural effusion. Clear   left lung. Cardiomediastinal silhouette is stable.    IMPRESSION: Right pleural effusion.

## 2018-03-30 NOTE — PROGRESS NOTE ADULT - ASSESSMENT
Assessment:  1.  Bilateral, extensive upper extremity DVT  - Currently on lovenox  - Swelling of RUE controlled with elevation and ace wrap  2.  Heart Transplant  3.  Acute renal failure  4.  Right 3rf digit ischemia - stable  5.  Right foot bunion      Plan  1   Continue with weight based lovenox 1mg/kg BID as long as creatine clearance is above 30  2.  Arm elevation and wrapping  3.  We favor lovenox over DOAC in post op setting - he is in agreement to inject himself at home upon d/c  4.  Hold lovenox 12 hours before and after biopsy  5.  Would ask hand sx to re-evaluate patient's right 3rd digit.     Thanks      Daysi [id number]

## 2018-03-30 NOTE — PROGRESS NOTE ADULT - ASSESSMENT
67 year old man with ACC/AHA stage D chronic systolic heart failure due to NICM (LVEF 20%) s/p HM2 LVAD 6/17 c/b pump thrombus now s/p OHT 2/23 (CMV D-/R+ and Toxo D-/R+), with post-op course c/b primary graft dysfunction, initially thought to be immune-mediated due to positive B-cell flow (negative CDC cross match) and received plasmapheresis/IVIG x2 with improvement in LV function. Found to have b/l IJ/subclavian thrombi as well. CrCl 52.    1) Antimicrobial prophylaxis:  Intermediate risk CMV - Valcyte 450mg daily  Intermediate risk Toxo/PCP - continue Mepron 1500mg PO daily with food.  Thrush - continue Nystatin S/S 5 mL Qid  HCV+ donor/HCV- recipient; HCV (1a) - 3/27 HCV PCR 32,891 (To be treated outpatient, monitoring weekly)    2) Leukocytosis  - Patient has no signs infection at bedside; no fevers, no chills. No focal complaints, feels well. Note history thrombus, prior episode rejection.  - Monitor for now  - BCX NGTD  - CXR stable    Over weekend, please call [phone] with questions or change in status.     Sander Lui MD  Pager [phone]  After 5pm and on weekends call [phone]

## 2018-03-30 NOTE — PROGRESS NOTE ADULT - SUBJECTIVE AND OBJECTIVE BOX
Subjective:  3 second run of PAT overnight, which was reviewed with Dr. Viramontes. Pt reports feeling well ambulating around nursing unit with improved exercise tolerance. He does report having to stop part way due to dyspnea, however. Denies SOB at rest, palpitations, lightheadedness, dizziness, orthopnea, PND, and nausea. Endorses good appetite. Denies fever, chills, cough.    Medications:  acetaminophen   Tablet. 650 milliGRAM(s) Oral every 6 hours PRN  ALBUTerol/ipratropium for Nebulization 3 milliLiter(s) Nebulizer every 6 hours  aspirin enteric coated 81 milliGRAM(s) Oral daily  atovaquone Suspension 1500 milliGRAM(s) Oral daily  Calcium Citrate + Vit D, 315 mg/200 Unit 2 Tablet(s) 2 Tablet(s) Oral two times a day  diltiazem    milliGRAM(s) Oral daily  docusate sodium 100 milliGRAM(s) Oral three times a day  enoxaparin Injectable 80 milliGRAM(s) SubCutaneous two times a day  famotidine    Tablet 20 milliGRAM(s) Oral daily  insulin lispro (HumaLOG) corrective regimen sliding scale   SubCutaneous three times a day before meals  insulin lispro (HumaLOG) corrective regimen sliding scale   SubCutaneous at bedtime  magnesium oxide 400 milliGRAM(s) Oral <User Schedule>  multivitamin 1 Tablet(s) Oral daily  mycophenolate mofetil 1000 milliGRAM(s) Oral <User Schedule>  nystatin    Suspension 761506 Unit(s) Oral every 6 hours  pravastatin 20 milliGRAM(s) Oral at bedtime  predniSONE   Tablet 15 milliGRAM(s) Oral every 12 hours  silver sulfADIAZINE 1% Cream 1 Application(s) Topical two times a day  sodium bicarbonate 650 milliGRAM(s) Oral three times a day  tacrolimus 10 milliGRAM(s) Oral <User Schedule>  tacrolimus 9 milliGRAM(s) Oral <User Schedule>  valGANciclovir 450 milliGRAM(s) Oral daily      Physical Exam:    Vitals:  Vital Signs Last 24 Hours  T(C): 36.7 (03-30-18 @ 16:03), Max: 37.1 (03-29-18 @ 19:43)  HR: 104 (03-30-18 @ 16:03) (97 - 106)  BP: 124/79 (03-30-18 @ 16:03) (120/80 - 140/88)  RR: 18 (03-30-18 @ 16:03) (18 - 18)  SpO2: 96% (03-30-18 @ 16:03) (96% - 97%)        I&O's Summary    29 Mar 2018 07:01  -  30 Mar 2018 07:00  --------------------------------------------------------  IN: 900 mL / OUT: 1075 mL / NET: -175 mL    30 Mar 2018 07:01  -  30 Mar 2018 18:11  --------------------------------------------------------  IN: 360 mL / OUT: 300 mL / NET: 60 mL    Tele: SR 1st deg AV block. HR  occasional PVCs, couplets. 3 seconds of PAT yesterday afternoon.    General: Lying in bed. No distress. Comfortable.  Neck: Neck supple. JVP does not appear elevated, though difficult to assess in setting of IJ thrombi.  Chest: Diminished RLL, otherwise CTAB  CV: Regular, tachycardic. Normal S1 and S2. + rub LLSB. Radial pulses normal. No LE edema.  Abdomen: Soft, rounded, non-distended, non-tender + BS  Skin: Sternal incision well approximated without drainage or erythema. Abdominal wounds dried with scab, no erythema or drainage. RLQ wound vac in place without drainage.  Neurology: Alert and oriented times three. Sensation intact  Psych: Affect normal    Labs:                        8.8    20.3  )-----------( 387      ( 30 Mar 2018 07:08 )             27.7     03-30    143  |  108  |  38<H>  ----------------------------<  125<H>  4.7   |  25  |  1.50<H>    Ca    8.9      30 Mar 2018 15:13  Mg     1.7     03-30    TPro  6.2  /  Alb  3.3  /  TBili  0.6  /  DBili  x   /  AST  28  /  ALT  51<H>  /  AlkPhos  91  03-30    PT/INR - ( 29 Mar 2018 07:21 )   PT: 12.5 sec;   INR: 1.15 ratio         PTT - ( 29 Mar 2018 07:21 )  PTT:50.2 sec

## 2018-03-30 NOTE — PROGRESS NOTE ADULT - SUBJECTIVE AND OBJECTIVE BOX
hello im ok    VITAL SIGNS    Telemetry:  nsr 90    Vital Signs Last 24 Hrs  T(C): 36.7 (03-30-18 @ 07:45), Max: 37.1 (03-29-18 @ 19:43)  T(F): 98 (03-30-18 @ 07:45), Max: 98.7 (03-29-18 @ 19:43)  HR: 97 (03-30-18 @ 07:45) (97 - 105)  BP: 135/93 (03-30-18 @ 07:45) (117/69 - 140/88)  RR: 18 (03-30-18 @ 07:45) (18 - 18)  SpO2: 96% (03-30-18 @ 07:45) (96% - 97%)                   03-29 @ 07:01  -  03-30 @ 07:00  --------------------------------------------------------  IN: 900 mL / OUT: 1075 mL / NET: -175 mL    03-30 @ 07:01  -  03-30 @ 11:48  --------------------------------------------------------  IN: 240 mL / OUT: 200 mL / NET: 40 mL          Daily     Daily         CAPILLARY BLOOD GLUCOSE      POCT Blood Glucose.: 103 mg/dL (30 Mar 2018 07:45)  POCT Blood Glucose.: 161 mg/dL (29 Mar 2018 21:45)  POCT Blood Glucose.: 137 mg/dL (29 Mar 2018 19:02)                  Coumadin    [ ] YES          [x  ]      NO         Reason:     lovenox for  anticoagulation for thrombus                          PHYSICAL EXAM        Neurology: alert and oriented x 3, nonfocal, no gross deficits  CV : S1 S2 , RRR   Sternal Wound :  CDI , Stable    Lungs: cta  Drains:     abd vac to suction  Abdomen: soft, nontender, nondistended, positive bowel sounds, last bowel movement +  :          voiding   Extremities:  - edema - calve tenderness             acetaminophen   Tablet. 650 milliGRAM(s) Oral every 6 hours PRN  ALBUTerol/ipratropium for Nebulization 3 milliLiter(s) Nebulizer every 6 hours  aspirin enteric coated 81 milliGRAM(s) Oral daily  atovaquone Suspension 1500 milliGRAM(s) Oral daily  Calcium Citrate + Vit D, 315 mg/200 Unit 2 Tablet(s) 2 Tablet(s) Oral two times a day  diltiazem    milliGRAM(s) Oral daily  docusate sodium 100 milliGRAM(s) Oral three times a day  enoxaparin Injectable 80 milliGRAM(s) SubCutaneous two times a day  famotidine    Tablet 20 milliGRAM(s) Oral daily  insulin lispro (HumaLOG) corrective regimen sliding scale   SubCutaneous three times a day before meals  insulin lispro (HumaLOG) corrective regimen sliding scale   SubCutaneous at bedtime  magnesium oxide 400 milliGRAM(s) Oral <User Schedule>  multivitamin 1 Tablet(s) Oral daily  mycophenolate mofetil 1000 milliGRAM(s) Oral <User Schedule>  nystatin    Suspension 483521 Unit(s) Oral every 6 hours  pravastatin 20 milliGRAM(s) Oral at bedtime  predniSONE   Tablet 15 milliGRAM(s) Oral every 12 hours  silver sulfADIAZINE 1% Cream 1 Application(s) Topical two times a day  sodium bicarbonate 650 milliGRAM(s) Oral three times a day  tacrolimus 10 milliGRAM(s) Oral <User Schedule>  tacrolimus 9 milliGRAM(s) Oral <User Schedule>  valGANciclovir 450 milliGRAM(s) Oral daily                    Physical Therapy Rec:   Home  [  ]   Home w/ PT  [  ]  Rehab  [  ]  Discussed with Cardiothoracic Team at AM rounds.

## 2018-03-30 NOTE — PROGRESS NOTE ADULT - ATTENDING COMMENTS
Doing well. R digit appears well granulated but more swollen; nontender/no drainage. Ambulating well. Las reviewed. Increase prograf as above. Possible d/c next Tuesday

## 2018-03-30 NOTE — PROGRESS NOTE ADULT - PROBLEM SELECTOR PLAN 3
Continue atovaquone 1500mg PO daily for PJP and toxoplasmosis prophylaxis in setting of hyperkalemia  Continue valganciclovir 450mg PO daily for CMV prophylaxis (intermediate risk).  Continue nystatin 500,000 units swish/swallow Q6for thrush prophylaxis

## 2018-03-30 NOTE — PROGRESS NOTE ADULT - SUBJECTIVE AND OBJECTIVE BOX
Learner: the patient  Barriers: the patient is not able to read    Patient is post cardiac transplant.    Method used: Verbal discussion and medication cards     Medication safety was discussed with the patient: adherence, interactions, medication precautions, miss dose instructions, purpose, side effects, signs and symptoms to report, and storage & handling    Patient was able to repeat and verbalize key points    Education Summary: Discharge immunosuppressant medications and prophylatic anti-infective agents reviewed with the patient. Outpatient medication schedule was discussed in detail including: medication name, indication, dose, administration times, treatment duration, side effects, drug interactions, and special instructions. Patient questions and concerns were answered and addressed. Patient demonstrated understanding.    Time spent discharge education: 30 minutes    MEDICATIONS  (STANDING):  ALBUTerol/ipratropium for Nebulization 3 milliLiter(s) Nebulizer every 6 hours  aspirin enteric coated 81 milliGRAM(s) Oral daily  atovaquone Suspension 1500 milliGRAM(s) Oral daily  Calcium Citrate + Vit D, 315 mg/200 Unit 2 Tablet(s) 2 Tablet(s) Oral two times a day  diltiazem    milliGRAM(s) Oral daily  docusate sodium 100 milliGRAM(s) Oral three times a day  enoxaparin Injectable 80 milliGRAM(s) SubCutaneous two times a day  famotidine    Tablet 20 milliGRAM(s) Oral daily  insulin lispro (HumaLOG) corrective regimen sliding scale   SubCutaneous three times a day before meals  insulin lispro (HumaLOG) corrective regimen sliding scale   SubCutaneous at bedtime  magnesium oxide 400 milliGRAM(s) Oral <User Schedule>  multivitamin 1 Tablet(s) Oral daily  mycophenolate mofetil 1000 milliGRAM(s) Oral <User Schedule>  nystatin    Suspension 330703 Unit(s) Oral every 6 hours  pravastatin 20 milliGRAM(s) Oral at bedtime  predniSONE   Tablet 15 milliGRAM(s) Oral every 12 hours  silver sulfADIAZINE 1% Cream 1 Application(s) Topical two times a day  sodium bicarbonate 650 milliGRAM(s) Oral three times a day  tacrolimus 10 milliGRAM(s) Oral <User Schedule>  tacrolimus 9 milliGRAM(s) Oral <User Schedule>  valGANciclovir 450 milliGRAM(s) Oral daily    MEDICATIONS  (PRN):  acetaminophen   Tablet. 650 milliGRAM(s) Oral every 6 hours PRN Mild Pain (1 - 3)      Cardiac Transplant Medications:  Tacrolimus adjusted to trough (10 mg AM and 9 mg PM)  Mycophenolate mofetil 1,000 mg PO twice a day  Prednisone taper (15 mg twice a day)  Atovaquone 1,500 mg PO daily   Nystatin swish and swallow four times a day  Valganciclovir 450 mg 1 tablet PO daily   Docusate and Senna                          8.8    20.3  )-----------( 387      ( 30 Mar 2018 07:08 )             27.7     03-30    143  |  108  |  38<H>  ----------------------------<  125<H>  4.7   |  25  |  1.50<H>    Ca    8.9      30 Mar 2018 15:13  Mg     1.7     03-30    TPro  6.2  /  Alb  3.3  /  TBili  0.6  /  DBili  x   /  AST  28  /  ALT  51<H>  /  AlkPhos  91  03-30    LIVER FUNCTIONS - ( 30 Mar 2018 07:08 )  Alb: 3.3 g/dL / Pro: 6.2 g/dL / ALK PHOS: 91 U/L / ALT: 51 U/L RC / AST: 28 U/L / GGT: x           Tacrolimus (), Serum: 10.3 ng/mL (03-30-18 @ 07:34)  Tacrolimus (), Serum: 10.0 ng/mL (03-29-18 @ 07:56)  Tacrolimus (), Serum: 12.0 ng/mL (03-28-18 @ 09:26)

## 2018-03-30 NOTE — PROGRESS NOTE ADULT - PROBLEM SELECTOR PLAN 7
Cardizem CD 300mg daily  Continue Asa 81 mg po daily   Continue Pravachol 20 mg po daily   Continue Calcium Citrate + Vit D,  twice daily   Continue max oxide 400 mg PO BID. Monitor daily Mg.  Continue MVI daily Cardizem CD 300mg daily  Continue Asa 81 mg po daily   Continue Pravachol 20 mg po daily   Continue Calcium Citrate + Vit D,  twice daily   Continue max oxide 400 mg PO BID. Monitor daily Mg.  Continue MVI daily  Hep C donor organ - follow up with hepatology outpatient following discharge.

## 2018-03-30 NOTE — CHART NOTE - NSCHARTNOTEFT_GEN_A_CORE
Source: Patient [ x ]    Family [ ]     other [ x ] RN, Comprehensive chart review     Pt seen for nutrition follow up. Reports good appetite and eating 100% of hospital meals. Pt states he is drinking 1-2 Ensure Enlive daily, encouraged pt to consume at least 2 throughout the day. Pt denies GI distress at this time. Pt requested to defer review of nutrition guidelines as they were reviewed yesterday.    Chart reviewed- s/p heart transplant on 2/23, post-op course c/b primary graft dysfunction and received plasmapharesis/IVIg x2 with improvement in LV function, bilateral IJ/subclavian thrombi, NORMAN- improving; with increased lymphocytes seen on the fourth biopsy, s/p fifth biopsy 3/28.    Diet : regular, low fiber, no concentrated potassium, Ensure Enlive 3 cans/day.      Admission Weight: 78.9kg  Current Weight: 75kg  Weight fluctuations noted, likely due to fluid shifts. Pt with 2+right arm edema.     Pertinent Medications: MEDICATIONS  (STANDING):  ALBUTerol/ipratropium for Nebulization 3 milliLiter(s) Nebulizer every 6 hours  aspirin enteric coated 81 milliGRAM(s) Oral daily  atovaquone Suspension 1500 milliGRAM(s) Oral daily  Calcium Citrate + Vit D, 315 mg/200 Unit 2 Tablet(s) 2 Tablet(s) Oral two times a day  diltiazem    milliGRAM(s) Oral daily  docusate sodium 100 milliGRAM(s) Oral three times a day  enoxaparin Injectable 80 milliGRAM(s) SubCutaneous two times a day  famotidine    Tablet 20 milliGRAM(s) Oral daily  insulin lispro (HumaLOG) corrective regimen sliding scale   SubCutaneous three times a day before meals  insulin lispro (HumaLOG) corrective regimen sliding scale   SubCutaneous at bedtime  magnesium oxide 400 milliGRAM(s) Oral <User Schedule>  multivitamin 1 Tablet(s) Oral daily  mycophenolate mofetil 1000 milliGRAM(s) Oral <User Schedule>  nystatin    Suspension 730990 Unit(s) Oral every 6 hours  pravastatin 20 milliGRAM(s) Oral at bedtime  predniSONE   Tablet 15 milliGRAM(s) Oral every 12 hours  silver sulfADIAZINE 1% Cream 1 Application(s) Topical two times a day  sodium bicarbonate 650 milliGRAM(s) Oral three times a day  sodium polystyrene sulfonate Suspension 30 Gram(s) Oral once  tacrolimus 10 milliGRAM(s) Oral <User Schedule>  tacrolimus 9 milliGRAM(s) Oral <User Schedule>  valGANciclovir 450 milliGRAM(s) Oral daily    MEDICATIONS  (PRN):  acetaminophen   Tablet. 650 milliGRAM(s) Oral every 6 hours PRN Mild Pain (1 - 3)    Pertinent Labs:    Complete Blood Count (03.30.18 @ 07:08)    Hemoglobin: 8.8 g/dL - low    Hematocrit: 27.7 % - low  Comprehensive Metabolic Panel (03.30.18 @ 07:08)    Sodium, Serum: 143 mmol/L    Potassium, Serum: 5.5 mmol/L    Chloride, Serum: 107 mmol/L    Carbon Dioxide, Serum: 24 mmol/L    Anion Gap, Serum: 12 mmol/L    Blood Urea Nitrogen, Serum: 40 mg/dL - high    Creatinine, Serum: 1.63 mg/dL - high    Glucose, Serum: 97 mg/dL - high    Calcium, Total Serum: 8.9 mg/dL    Protein Total, Serum: 6.2 g/dL    Albumin, Serum: 3.3 g/dL    Bilirubin Total, Serum: 0.6 mg/dL    Alkaline Phosphatase, Serum: 91 U/L    Aspartate Aminotransferase (AST/SGOT): 28 U/L    Alanine Aminotransferase (ALT/SGPT): 51 U/L RC    Point of Care Testing BG: (3/29)110-161    Skin: No pressure ulcers noted.    Estimated Needs:   [ x ] no change since previous assessment  [ ] recalculated:       Previous Nutrition Diagnosis:   Inadequate Oral Intake and Increased Nutrient Needs improving with PO diet and supplementation.  Food & Nutrition Related Knowledge Deficit remains, addressed with continued education as accepted by pt.      New Nutrition Diagnosis: [ x ] not applicable    Interventions:   1. Encourage PO intake with all meals.  2. Provide food preferences as able.  3. Encourage intake of 2-3 Ensure Enlive daily.  4. Encourage use of alternate menu options as needed.  5. If serum potassium remains WDL, consider liberalizing diet to regular/low fiber.   6. RD to follow up with further diet instruction as accepted by pt.         Monitoring and Evaluation:     [ x ] PO intake [ x ] Tolerance to diet prescription [ x ] weights [ x ] follow up per protocol    [ ] other:

## 2018-03-30 NOTE — PROGRESS NOTE ADULT - PROBLEM SELECTOR PLAN 1
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  Tacro increased 3/21 and will monitor crt given recent increase due to worsening cardiac rejection. Crt stable at 1.5-1.6 now from residual ATN vs vasoconstriction  Monitor renal function as you increase tacro  Dose abx now as Crt is improving  Dosing of all agents such as valcyte as renal function improves to more frequent  Cont mepron for now

## 2018-03-30 NOTE — PROGRESS NOTE ADULT - SUBJECTIVE AND OBJECTIVE BOX
[facility] DIVISION OF KIDNEY DISEASES AND HYPERTENSION -- FOLLOW UP NOTE  --------------------------------------------------------------------------------  Chief Complaint:  NORMAN    24 hour events/subjective:  ongoing hyperkalemia      PAST HISTORY  --------------------------------------------------------------------------------  No significant changes to PMH, PSH, FHx, SHx, unless otherwise noted    ALLERGIES & MEDICATIONS  --------------------------------------------------------------------------------  Allergies    No Known Allergies    Intolerances      Standing Inpatient Medications  ALBUTerol/ipratropium for Nebulization 3 milliLiter(s) Nebulizer every 6 hours  aspirin enteric coated 81 milliGRAM(s) Oral daily  atovaquone Suspension 1500 milliGRAM(s) Oral daily  Calcium Citrate + Vit D, 315 mg/200 Unit 2 Tablet(s) 2 Tablet(s) Oral two times a day  diltiazem    milliGRAM(s) Oral daily  docusate sodium 100 milliGRAM(s) Oral three times a day  enoxaparin Injectable 80 milliGRAM(s) SubCutaneous two times a day  famotidine    Tablet 20 milliGRAM(s) Oral daily  insulin lispro (HumaLOG) corrective regimen sliding scale   SubCutaneous three times a day before meals  insulin lispro (HumaLOG) corrective regimen sliding scale   SubCutaneous at bedtime  magnesium oxide 400 milliGRAM(s) Oral <User Schedule>  multivitamin 1 Tablet(s) Oral daily  mycophenolate mofetil 1000 milliGRAM(s) Oral <User Schedule>  nystatin    Suspension 668365 Unit(s) Oral every 6 hours  pravastatin 20 milliGRAM(s) Oral at bedtime  predniSONE   Tablet 15 milliGRAM(s) Oral every 12 hours  silver sulfADIAZINE 1% Cream 1 Application(s) Topical two times a day  sodium bicarbonate 650 milliGRAM(s) Oral three times a day  tacrolimus 10 milliGRAM(s) Oral <User Schedule>  tacrolimus 9 milliGRAM(s) Oral <User Schedule>  valGANciclovir 450 milliGRAM(s) Oral daily    PRN Inpatient Medications  acetaminophen   Tablet. 650 milliGRAM(s) Oral every 6 hours PRN      REVIEW OF SYSTEMS  --------------------------------------------------------------------------------  Gen: No weight changes, fatigue, fevers/chills, weakness  Skin: No rashes  Head/Eyes/Ears/Mouth: No headache; Normal hearing; Normal vision w/o blurriness; No sinus pain/discomfort, sore throat  Respiratory: No dyspnea, cough, wheezing, hemoptysis  CV: No chest pain, PND, orthopnea  GI: No abdominal pain, diarrhea, constipation, nausea, vomiting, melena, hematochezia  : No increased frequency, dysuria, hematuria, nocturia  MSK: No joint pain/swelling; no back pain; no edema  Neuro: No dizziness/lightheadedness, weakness, seizures, numbness, tingling  Heme: No easy bruising or bleeding  Endo: No heat/cold intolerance  Psych: No significant nervousness, anxiety, stress, depression    All other systems were reviewed and are negative, except as noted.    VITALS/PHYSICAL EXAM  --------------------------------------------------------------------------------  T(C): 36.4 (03-30-18 @ 03:36), Max: 37.1 (03-29-18 @ 19:43)  HR: 99 (03-30-18 @ 03:36) (99 - 105)  BP: 140/88 (03-30-18 @ 03:36) (117/69 - 140/88)  RR: 18 (03-30-18 @ 03:36) (18 - 18)  SpO2: 97% (03-29-18 @ 19:43) (96% - 97%)  Wt(kg): --        03-29-18 @ 07:01  -  03-30-18 @ 07:00  --------------------------------------------------------  IN: 900 mL / OUT: 1075 mL / NET: -175 mL      Physical Exam:  	Gen: NAD, well-appearing  	HEENT: PERRL, supple neck, clear oropharynx  	Pulm: CTA B/L  	CV: RRR, S1S2; no rub  	Back: No spinal or CVA tenderness; no sacral edema  	Abd: +BS, soft, nontender/nondistended  	: No suprapubic tenderness  	UE: Warm, FROM, no clubbing, intact strength; no edema; no asterixis  	LE: Warm, FROM, no clubbing, intact strength; no edema  	    LABS/STUDIES  --------------------------------------------------------------------------------              8.8    20.3  >-----------<  387      [03-30-18 @ 07:08]              27.7     143  |  107  |  40  ----------------------------<  97      [03-30-18 @ 07:08]  5.5   |  24  |  1.63        Ca     8.9     [03-30-18 @ 07:08]      Mg     1.7     [03-30-18 @ 07:08]    TPro  6.2  /  Alb  3.3  /  TBili  0.6  /  DBili  x   /  AST  28  /  ALT  51  /  AlkPhos  91  [03-30-18 @ 07:08]    PT/INR: PT 12.5 , INR 1.15       [03-29-18 @ 07:21]  PTT: 50.2       [03-29-18 @ 07:21]      Creatinine Trend:  SCr 1.63 [03-30 @ 07:08]  SCr 1.59 [03-29 @ 07:21]  SCr 1.69 [03-28 @ 07:15]  SCr 1.82 [03-27 @ 07:19]  SCr 2.14 [03-26 @ 07:15]    Urinalysis - [03-23-18 @ 16:03]      Color Yellow / Appearance Slightly Turbid / SG 1.020 / pH 5.0      Gluc Negative / Ketone Negative  / Bili Negative / Urobili Negative       Blood Small / Protein 30 / Leuk Est Small / Nitrite Negative      RBC 5 / WBC 8 / Hyaline 0 / Gran  / Sq Epi  / Non Sq Epi 4 / Bacteria Few      Iron 22, TIBC 182, %sat 12      [02-13-18 @ 12:44]  Ferritin 79.0      [02-13-18 @ 12:44]  HbA1c 5.3      [03-18-18 @ 11:20]  TSH 1.48      [02-27-18 @ 08:13]  Lipid: chol 62, TG 33, HDL 17, LDL 38      [06-07-17 @ 06:14]      C3 Complement 135      [03-19-18 @ 15:13]  C4 Complement 37      [03-19-18 @ 15:13]  Free Light Chains: kappa 4.36, lambda 5.71, ratio = 0.76      [03-19 @ 15:13]  Immunofixation Serum:   No Monoclonal Band Identified      [03-19-18 @ 15:13]  Cryoglobulin: Negative      [03-19-18 @ 15:13]

## 2018-03-30 NOTE — PROGRESS NOTE ADULT - SUBJECTIVE AND OBJECTIVE BOX
VASCULAR MEDICINE                         CC:  UEDVT    INTERVAL HISTORY:     No CP or SOB. R arm without symptoms.  Now on lovenox.    MEDICATIONS  (STANDING):  ALBUTerol/ipratropium for Nebulization 3 milliLiter(s) Nebulizer every 6 hours  aspirin enteric coated 81 milliGRAM(s) Oral daily  atovaquone Suspension 1500 milliGRAM(s) Oral daily  Calcium Citrate + Vit D, 315 mg/200 Unit 2 Tablet(s) 2 Tablet(s) Oral two times a day  diltiazem    milliGRAM(s) Oral daily  docusate sodium 100 milliGRAM(s) Oral three times a day  enoxaparin Injectable 80 milliGRAM(s) SubCutaneous two times a day  famotidine    Tablet 20 milliGRAM(s) Oral daily  insulin lispro (HumaLOG) corrective regimen sliding scale   SubCutaneous three times a day before meals  insulin lispro (HumaLOG) corrective regimen sliding scale   SubCutaneous at bedtime  magnesium oxide 400 milliGRAM(s) Oral <User Schedule>  multivitamin 1 Tablet(s) Oral daily  mycophenolate mofetil 1000 milliGRAM(s) Oral <User Schedule>  nystatin    Suspension 501604 Unit(s) Oral every 6 hours  pravastatin 20 milliGRAM(s) Oral at bedtime  predniSONE   Tablet 15 milliGRAM(s) Oral every 12 hours  silver sulfADIAZINE 1% Cream 1 Application(s) Topical two times a day  sodium bicarbonate 650 milliGRAM(s) Oral three times a day  tacrolimus 10 milliGRAM(s) Oral <User Schedule>  tacrolimus 9 milliGRAM(s) Oral <User Schedule>  valGANciclovir 450 milliGRAM(s) Oral daily    MEDICATIONS  (PRN):  acetaminophen   Tablet. 650 milliGRAM(s) Oral every 6 hours PRN Mild Pain (1 - 3)      REVIEW OF SYSTEMS:  CONSTITUTIONAL: No fever, weight loss, or fatigue  EYES: No eye pain, visual disturbances, or discharge  ENMT:  No difficulty hearing, tinnitus, vertigo; No sinus or throat pain  NECK: No pain or stiffness  RESPIRATORY: No cough, wheezing, chills or hemoptysis; No Shortness of Breath  CARDIOVASCULAR: No chest pain, palpitations, passing out, dizziness, or leg swelling  GASTROINTESTINAL: No abdominal or epigastric pain. No nausea, vomiting, or hematemesis; No diarrhea or constipation. No melena or hematochezia.  GENITOURINARY: No dysuria, frequency, hematuria, or incontinence  NEUROLOGICAL: No headaches, memory loss, loss of strength, numbness, or tremors  SKIN: No itching, burning, rashes, or lesions   LYMPH Nodes: No enlarged glands  ENDOCRINE: No heat or cold intolerance; No hair loss  MUSCULOSKELETAL: No joint pain or swelling; No muscle, back, or extremity pain  PSYCHIATRIC: No depression, anxiety, mood swings, or difficulty sleeping  HEME/LYMPH: No easy bruising, or bleeding gums  ALLERY AND IMMUNOLOGIC: No hives or eczema	    [x ] All others negative	  [ ] Unable to obtain      ICU Vital Signs Last 24 Hrs  T(C): 36.7 (30 Mar 2018 07:45), Max: 37.1 (29 Mar 2018 19:43)  T(F): 98 (30 Mar 2018 07:45), Max: 98.7 (29 Mar 2018 19:43)  HR: 97 (30 Mar 2018 07:45) (97 - 105)  BP: 135/93 (30 Mar 2018 07:45) (117/69 - 140/88)  BP(mean): --  ABP: --  ABP(mean): --  RR: 18 (30 Mar 2018 07:45) (18 - 18)  SpO2: 96% (30 Mar 2018 07:45) (96% - 97%)      Appearance: Normal	  HEENT:   Normal oral mucosa, PERRL, EOMI	  Lymphatic: No lymphadenopathy  Cardiovascular: Normal S1 S2   Respiratory: Lungs clear to auscultation	  Psychiatry: A & O x 3, Mood & affect appropriate  Gastrointestinal:  Soft, Non-tender, + BS	  Skin: No rashes, No ecchymoses, No cyanosis	  Neurologic: Non-focal  Extremities:  Right upper extremity bicep area fullness and edema which is improving.  Forearm without edema.  Strong Right radial pulse.  The 3rd digit is has distal eschar.  The dorsal aspect is larger and has a small break in the skin.                                    8.8    20.3  )-----------( 387      ( 30 Mar 2018 07:08 )             27.7   03-30    143  |  107  |  40<H>  ----------------------------<  97  5.5<H>   |  24  |  1.63<H>    Ca    8.9      30 Mar 2018 07:08  Mg     1.7     03-30    TPro  6.2  /  Alb  3.3  /  TBili  0.6  /  DBili  x   /  AST  28  /  ALT  51<H>  /  AlkPhos  91  03-30

## 2018-03-30 NOTE — PROGRESS NOTE ADULT - PROBLEM SELECTOR PLAN 2
Tacro level this morning 10.3 from 10.0.   Continue tacro 10mg at 0800 and 9mg at 2000. Daily tacro level prior to AM dose to be sent to STAT lab.  Continue CellCept 1000 mg BID  Continue prednisone taper  Ongoing teaching and reinforcement

## 2018-03-30 NOTE — PROGRESS NOTE ADULT - ATTENDING COMMENTS
For weekend coverage, call Dr Isadora Shine(fellow) or Dr.Rimda Lucia(Attending)    Sarkis Hutchins MD  Cell   Pager   Office

## 2018-03-31 LAB
ALBUMIN SERPL ELPH-MCNC: 3.3 G/DL — SIGNIFICANT CHANGE UP (ref 3.3–5)
ALP SERPL-CCNC: 85 U/L — SIGNIFICANT CHANGE UP (ref 40–120)
ALT FLD-CCNC: 51 U/L RC — HIGH (ref 10–45)
ANION GAP SERPL CALC-SCNC: 12 MMOL/L — SIGNIFICANT CHANGE UP (ref 5–17)
AST SERPL-CCNC: 25 U/L — SIGNIFICANT CHANGE UP (ref 10–40)
BILIRUB SERPL-MCNC: 0.6 MG/DL — SIGNIFICANT CHANGE UP (ref 0.2–1.2)
BUN SERPL-MCNC: 31 MG/DL — HIGH (ref 7–23)
CALCIUM SERPL-MCNC: 8.6 MG/DL — SIGNIFICANT CHANGE UP (ref 8.4–10.5)
CHLORIDE SERPL-SCNC: 107 MMOL/L — SIGNIFICANT CHANGE UP (ref 96–108)
CO2 SERPL-SCNC: 24 MMOL/L — SIGNIFICANT CHANGE UP (ref 22–31)
CREAT SERPL-MCNC: 1.45 MG/DL — HIGH (ref 0.5–1.3)
GLUCOSE BLDC GLUCOMTR-MCNC: 104 MG/DL — HIGH (ref 70–99)
GLUCOSE BLDC GLUCOMTR-MCNC: 112 MG/DL — HIGH (ref 70–99)
GLUCOSE BLDC GLUCOMTR-MCNC: 134 MG/DL — HIGH (ref 70–99)
GLUCOSE BLDC GLUCOMTR-MCNC: 141 MG/DL — HIGH (ref 70–99)
GLUCOSE SERPL-MCNC: 90 MG/DL — SIGNIFICANT CHANGE UP (ref 70–99)
HCT VFR BLD CALC: 28.8 % — LOW (ref 39–50)
HGB BLD-MCNC: 9 G/DL — LOW (ref 13–17)
MAGNESIUM SERPL-MCNC: 1.5 MG/DL — LOW (ref 1.6–2.6)
MCHC RBC-ENTMCNC: 29.8 PG — SIGNIFICANT CHANGE UP (ref 27–34)
MCHC RBC-ENTMCNC: 31.3 GM/DL — LOW (ref 32–36)
MCV RBC AUTO: 95.4 FL — SIGNIFICANT CHANGE UP (ref 80–100)
PLATELET # BLD AUTO: 363 K/UL — SIGNIFICANT CHANGE UP (ref 150–400)
POTASSIUM SERPL-MCNC: 5 MMOL/L — SIGNIFICANT CHANGE UP (ref 3.5–5.3)
POTASSIUM SERPL-SCNC: 5 MMOL/L — SIGNIFICANT CHANGE UP (ref 3.5–5.3)
PROT SERPL-MCNC: 6.1 G/DL — SIGNIFICANT CHANGE UP (ref 6–8.3)
RBC # BLD: 3.02 M/UL — LOW (ref 4.2–5.8)
RBC # FLD: 19.4 % — HIGH (ref 10.3–14.5)
SODIUM SERPL-SCNC: 143 MMOL/L — SIGNIFICANT CHANGE UP (ref 135–145)
TACROLIMUS SERPL-MCNC: 10 NG/ML — SIGNIFICANT CHANGE UP
WBC # BLD: 18.8 K/UL — HIGH (ref 3.8–10.5)
WBC # FLD AUTO: 18.8 K/UL — HIGH (ref 3.8–10.5)

## 2018-03-31 PROCEDURE — 99233 SBSQ HOSP IP/OBS HIGH 50: CPT | Mod: GC

## 2018-03-31 PROCEDURE — 71045 X-RAY EXAM CHEST 1 VIEW: CPT | Mod: 26

## 2018-03-31 RX ORDER — DILTIAZEM HCL 120 MG
360 CAPSULE, EXT RELEASE 24 HR ORAL DAILY
Qty: 0 | Refills: 0 | Status: DISCONTINUED | OUTPATIENT
Start: 2018-04-01 | End: 2018-04-05

## 2018-03-31 RX ADMIN — Medication 1 APPLICATION(S): at 17:46

## 2018-03-31 RX ADMIN — Medication 81 MILLIGRAM(S): at 12:45

## 2018-03-31 RX ADMIN — MAGNESIUM OXIDE 400 MG ORAL TABLET 400 MILLIGRAM(S): 241.3 TABLET ORAL at 07:55

## 2018-03-31 RX ADMIN — Medication 3 MILLILITER(S): at 12:44

## 2018-03-31 RX ADMIN — Medication 650 MILLIGRAM(S): at 06:57

## 2018-03-31 RX ADMIN — Medication 500000 UNIT(S): at 07:00

## 2018-03-31 RX ADMIN — Medication 100 MILLIGRAM(S): at 06:56

## 2018-03-31 RX ADMIN — Medication 500000 UNIT(S): at 00:24

## 2018-03-31 RX ADMIN — ENOXAPARIN SODIUM 80 MILLIGRAM(S): 100 INJECTION SUBCUTANEOUS at 07:05

## 2018-03-31 RX ADMIN — Medication 500000 UNIT(S): at 17:46

## 2018-03-31 RX ADMIN — Medication 650 MILLIGRAM(S): at 21:41

## 2018-03-31 RX ADMIN — Medication 3 MILLILITER(S): at 23:26

## 2018-03-31 RX ADMIN — ENOXAPARIN SODIUM 80 MILLIGRAM(S): 100 INJECTION SUBCUTANEOUS at 17:45

## 2018-03-31 RX ADMIN — MYCOPHENOLATE MOFETIL 1000 MILLIGRAM(S): 250 CAPSULE ORAL at 07:55

## 2018-03-31 RX ADMIN — Medication 650 MILLIGRAM(S): at 12:45

## 2018-03-31 RX ADMIN — Medication 300 MILLIGRAM(S): at 06:57

## 2018-03-31 RX ADMIN — TACROLIMUS 10 MILLIGRAM(S): 5 CAPSULE ORAL at 07:55

## 2018-03-31 RX ADMIN — FAMOTIDINE 20 MILLIGRAM(S): 10 INJECTION INTRAVENOUS at 12:45

## 2018-03-31 RX ADMIN — MYCOPHENOLATE MOFETIL 1000 MILLIGRAM(S): 250 CAPSULE ORAL at 20:02

## 2018-03-31 RX ADMIN — VALGANCICLOVIR 450 MILLIGRAM(S): 450 TABLET, FILM COATED ORAL at 12:45

## 2018-03-31 RX ADMIN — Medication 500000 UNIT(S): at 12:44

## 2018-03-31 RX ADMIN — TACROLIMUS 9 MILLIGRAM(S): 5 CAPSULE ORAL at 20:02

## 2018-03-31 RX ADMIN — MAGNESIUM OXIDE 400 MG ORAL TABLET 400 MILLIGRAM(S): 241.3 TABLET ORAL at 20:02

## 2018-03-31 RX ADMIN — Medication 1 TABLET(S): at 12:45

## 2018-03-31 RX ADMIN — Medication 15 MILLIGRAM(S): at 06:56

## 2018-03-31 RX ADMIN — Medication 500000 UNIT(S): at 23:26

## 2018-03-31 RX ADMIN — Medication 3 MILLILITER(S): at 17:45

## 2018-03-31 RX ADMIN — ATOVAQUONE 1500 MILLIGRAM(S): 750 SUSPENSION ORAL at 12:45

## 2018-03-31 RX ADMIN — Medication 3 MILLILITER(S): at 00:24

## 2018-03-31 RX ADMIN — Medication 20 MILLIGRAM(S): at 21:41

## 2018-03-31 RX ADMIN — Medication 3 MILLILITER(S): at 06:57

## 2018-03-31 RX ADMIN — Medication 1 APPLICATION(S): at 07:00

## 2018-03-31 NOTE — PROGRESS NOTE ADULT - PROBLEM SELECTOR PLAN 3
plastics consulted recalled   no change in intervention   fup plastics as outpt  dressing change with silvadene

## 2018-03-31 NOTE — PROGRESS NOTE ADULT - ASSESSMENT
Mr. Baez is a 67 year old man with ACC/AHA stage D chronic systolic heart failure due to NICM s/p HM2 LVAD 6/17 c/b pump thrombosis now s/p heart transplant on 2/23 (CMV D-/R+ and Toxo D-/R+), with post-op course c/b primary graft dysfunction, initially thought to be immune-mediated due to positive B-cell flow (negative CDC cross match) and received plasmapharesis/IVIg x2 with improvement in LV function. His course has been complicated by bilateral IJ/subclavian thrombi. He currently has acute renal failure of unclear etiology. However, he does not have clear evidence of allograft dysfunction or low output on exam. 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     # Immunosuppression:  Tacrolimus -  Will continue dosing at 9 mg BID (increased yesterday). Tacro level 7 this morning from 9.4 Target trough level of 12-14. - - cardizem  240 qd  CellCept - continue 1000 mg PO BID.   Steroids - will give short pulse of oral steroids. Will give 50 mg PO BID for three days with subsequent taper.  Taper starting 3/24 45 mg x2 the 40 x2  decrease by 10  until 15 mg bid- ordered    #Hypertension  - Increase  cardizem 240 mg daily.   	  # Antimicrobial prophylaxis:  Intermediate risk CMV - Valcyte for NORMAN to 450 mg daily just TWICE weekly.   Intermediate risk Toxo - continue Mepron 1500mg PO daily with food.  PCP - continue Mepron 1500mg PO daily  Thrush - continue Nystatin S/S 5 mL QID    # Bilateral IJ/Subclavian thrombi - unchanged on f/u U/S 3/12  -  heparin 13,000 units subcutaneous Q12 (250 units/kg Q12) per vasc cards recs in setting of renal function.  PTT q 6 hrs after administration dosed by Dr Tavarez  - CHERIE negative  Plan for Venous duplex UE on monday per Dr Tavarez    #Acute renal failure, possibly related to vancomycin dosing, improving.   - Vanco stopped.   - No further diuresis.     # CAV PPx  - Continue ECASA 81mg PO daily  - Continue pravastatin 20mg PO QHS    # ID   - Pseudomonas in sputum - course of cefepime completed 3/15  - DLES improved and vancomycin stopped given high trough with NORMAN. Per ID, will redose with daptomycin depending upon repeat vanc level this morning.   - silver sulfadizaine dressings per plastics for R 3rd digit.   - HCV viral load and PCR per protocol. Therapy to be initiated as outpatient by Dr. Thomas (Hepatology)  - Transplant ID following      3/26 Pt for repeat UE thrombus as per HF, r/o propagation  Creat is improving may need medications adjustment  Ambulate  3/27 Biopsy now scheduled for Wednesday. April 4    UE duplex with new innominate vein partial thrombus, cont anticoagulation, now therapeutic. D/w Dr Tavarez  NPO p Mn for biopsy, hold heparin tonight.  Prograf level down , to d/w HF  D/c planning   3/28 prograf increased to 9mg bid.  Renal function continues to improve  Will resume hep sq tonight after biopsy this am  3/29 - Prograf 0.5mg po x 1 stat & increase Prograf to 9.5mg po bid  increase CardizemCD to 300mg po daily - received CardizemCD 240 this am therefore Cardizem 60mg po x 1 given this am  CXR w/ RLL haziness - will ultrasound  R LL today  d/c planning  36/30 Hyperkalemia - kayexalate 30 x1  Na bicarb added  Creat improved, now on lovenox  3/31   feeling well today,  VSS, TAC level 10.0, education by transplant coordinator with family, potassium stable  5.0 Mr. Baez is a 67 year old man with ACC/AHA stage D chronic systolic heart failure due to NICM s/p HM2 LVAD 6/17 c/b pump thrombosis now s/p heart transplant on 2/23 (CMV D-/R+ and Toxo D-/R+), with post-op course c/b primary graft dysfunction, initially thought to be immune-mediated due to positive B-cell flow (negative CDC cross match) and received plasmapharesis/IVIg x2 with improvement in LV function. His course has been complicated by bilateral IJ/subclavian thrombi. He currently has acute renal failure of unclear etiology. However, he does not have clear evidence of allograft dysfunction or low output on exam. 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     # Immunosuppression:  Tacrolimus -  Will continue dosing at 9 mg BID (increased yesterday). Tacro level 7 this morning from 9.4 Target trough level of 12-14. - - cardizem  240 qd  CellCept - continue 1000 mg PO BID.   Steroids - will give short pulse of oral steroids. Will give 50 mg PO BID for three days with subsequent taper.  Taper starting 3/24 45 mg x2 the 40 x2  decrease by 10  until 15 mg bid- ordered    # Antimicrobial prophylaxis:  PCP - continue Mepron 1500mg PO daily      # Bilateral IJ/Subclavian thrombi - unchanged on f/u U/S 3/12  -   - CHERIE negative    #Acute renal failure, possibly related to vancomycin dosing, improving.   - Vanco stopped.   - No further diuresis.     # CAV PPx  - Continue ECASA 81mg PO daily  - Continue pravastatin 20mg PO QHS    # ID   - Pseudomonas in sputum - course of cefepime completed 3/15  - DLES improved and vancomycin stopped given high trough with NORMAN. Per ID, will redose with daptomycin depending upon repeat vanc level this morning.   - silver sulfadizaine dressings per plastics for R 3rd digit.   - HCV viral load and PCR per protocol. Therapy to be initiated as outpatient by Dr. Thomas (Hepatology)  - Transplant ID following      3/26 Pt for repeat UE thrombus as per HF, r/o propagation  Creat is improving may need medications adjustment  Ambulate  3/27 Biopsy now scheduled for Wednesday. April 4    UE duplex with new innominate vein partial thrombus, cont anticoagulation, now therapeutic. D/w Dr Tavarez     3/28 prograf increased to 9mg bid.  Renal function continues to improve  Will resume hep sq tonight after biopsy this am  3/29 - Prograf 0.5mg po x 1 stat & increase Prograf to 9.5mg po bid  increase CardizemCD to 300mg po daily - received CardizemCD 240 this am therefore Cardizem 60mg po x 1 given this am  CXR w/ RLL haziness - will ultrasound  R LL today  36/30 Hyperkalemia - kayexalate 30 x1  Na bicarb added  Creat improved, now on lovenox  3/31   feeling well today,  VSS, TAC level 10.0, education by transplant coordinator with family, potassium stable  5.0,   cardizem to 360 QD

## 2018-03-31 NOTE — PROGRESS NOTE ADULT - ASSESSMENT
Mr. Baez is a 67 year old man with ACC/AHA stage D chronic systolic heart failure due to NICM s/p HM2 LVAD 6/17 c/b pump thrombosis now s/p heart transplant on 2/23 (CMV D-/R+ and Toxo D-/R+), with post-op course c/b primary graft dysfunction, initially thought to be immune-mediated due to positive B-cell flow (negative CDC cross match) and received plasmapharesis/IVIg x2 with improvement in LV function. His course has been complicated by bilateral IJ/subclavian thrombi and pseudomonas infection, which was treated.    3/28 RHC pressures: RA 11 PA 52/13/31, wedge 24, PA Sat % 59.3 , CO/CI 6.6/3.5.    Currently doing well. He currently appears euvolemic with improving exercise tolerance. Normotensive with stable renal function. Tacro level slightly low but uptrending. Blood glucose well controlled despite steroids.

## 2018-03-31 NOTE — PROGRESS NOTE ADULT - PROBLEM SELECTOR PLAN 2
Tacro level this morning 10 (stable)  Continue tacro 10mg at 0800 and 9mg at 2000. Daily tacro level prior to AM dose to be sent to STAT lab.  Continue CellCept 1000 mg BID  Continue prednisone taper  Ongoing teaching and reinforcement

## 2018-03-31 NOTE — PROGRESS NOTE ADULT - PROBLEM SELECTOR PLAN 1
Renal function has been slowly improving.  Pending serum chemistry for today.  Risk for renal injury from elevated tacrolimus, will follow with you.  Valcyte dosing appropriate for renal function, Cont mepron for now  Urine output reassuring Renal function slowly improving   Valcyte dosing appropriate for renal function, Cont mepron for now  Urine output close to 600 cc

## 2018-03-31 NOTE — PROGRESS NOTE ADULT - SUBJECTIVE AND OBJECTIVE BOX
VITAL SIGNS    Telemetry:  sr   st 110    Vital Signs Last 24 Hrs  T(C): 36.1 (18 @ 07:48), Max: 36.7 (18 @ 16:03)  T(F): 97 (18 @ 07:48), Max: 98.1 (18 @ 16:03)  HR: 104 (18 @ 07:48) (104 - 112)  BP: 145/95 (18 @ 07:48) (124/79 - 145/95)  RR: 18 (18 @ 07:48) (18 - 18)  SpO2: 97% (18 @ 07:48) (96% - 97%)             Daily Weight in k.9 (31 Mar 2018 06:32)      Bilirubin Total, Serum: 0.6 mg/dL ( @ 07:31)    CAPILLARY BLOOD GLUCOSE  104 (31 Mar 2018 07:48)      POCT Blood Glucose.: 104 mg/dL (31 Mar 2018 07:45)  POCT Blood Glucose.: 174 mg/dL (30 Mar 2018 21:30)  POCT Blood Glucose.: 95 mg/dL (30 Mar 2018 19:20)  POCT Blood Glucose.: 119 mg/dL (30 Mar 2018 12:07)                                PHYSICAL EXAM    Neurology: alert and oriented x 3, moves all extremities with no defecits  CV :  RRR  Sternal Wound :  CDI , Stable  Lungs:   CTA B/L  Abdomen: soft, nontender, nondistended, positive bowel sounds,   Extremities:       trace bl   le edema VITAL SIGNS    Telemetry:  sr   st 110    Vital Signs Last 24 Hrs  T(C): 36.1 (18 @ 07:48), Max: 36.7 (18 @ 16:03)  T(F): 97 (18 @ 07:48), Max: 98.1 (18 @ 16:03)  HR: 104 (18 @ 07:48) (104 - 112)  BP: 145/95 (18 @ 07:48) (124/79 - 145/95)  RR: 18 (18 @ 07:48) (18 - 18)  SpO2: 97% (18 @ 07:48) (96% - 97%)             Daily Weight in k.9 (31 Mar 2018 06:32)      Bilirubin Total, Serum: 0.6 mg/dL ( @ 07:31)    CAPILLARY BLOOD GLUCOSE  104 (31 Mar 2018 07:48)      POCT Blood Glucose.: 104 mg/dL (31 Mar 2018 07:45)  POCT Blood Glucose.: 174 mg/dL (30 Mar 2018 21:30)  POCT Blood Glucose.: 95 mg/dL (30 Mar 2018 19:20)  POCT Blood Glucose.: 119 mg/dL (30 Mar 2018 12:07)                                PHYSICAL EXAM    Neurology: alert and oriented x 3, moves all extremities with no defecits  CV :  RRR  Sternal Wound :  CDI , Stable  Lungs:   CTA B/L  Abdomen: soft, nontender, nondistended, positive bowel sounds,   Extremities: VITAL SIGNS    Telemetry:  sr   st 110    Vital Signs Last 24 Hrs  T(C): 36.1 (18 @ 07:48), Max: 36.7 (18 @ 16:03)  T(F): 97 (18 @ 07:48), Max: 98.1 (18 @ 16:03)  HR: 104 (18 @ 07:48) (104 - 112)  BP: 145/95 (18 @ 07:48) (124/79 - 145/95)  RR: 18 (18 @ 07:48) (18 - 18)  SpO2: 97% (18 @ 07:48) (96% - 97%)             Daily Weight in k.9 (31 Mar 2018 06:32)      Bilirubin Total, Serum: 0.6 mg/dL ( @ 07:31)    CAPILLARY BLOOD GLUCOSE  104 (31 Mar 2018 07:48)      POCT Blood Glucose.: 104 mg/dL (31 Mar 2018 07:45)  POCT Blood Glucose.: 174 mg/dL (30 Mar 2018 21:30)  POCT Blood Glucose.: 95 mg/dL (30 Mar 2018 19:20)  POCT Blood Glucose.: 119 mg/dL (30 Mar 2018 12:07)                                PHYSICAL EXAM    Neurology: alert and oriented x 3, moves all extremities with no defecits  CV :  RRR  Sternal Wound :  CDI , Stable  Lungs:   CTA B/L  Abdomen: soft, nontender, nondistended,bm   3/30  Extremities:     rt middle finger w/ dressing     trace to plus one pedal edema

## 2018-03-31 NOTE — PROGRESS NOTE ADULT - SUBJECTIVE AND OBJECTIVE BOX
Interval History:  doing well  family here to learn    Medications:  acetaminophen   Tablet. 650 milliGRAM(s) Oral every 6 hours PRN  ALBUTerol/ipratropium for Nebulization 3 milliLiter(s) Nebulizer every 6 hours  aspirin enteric coated 81 milliGRAM(s) Oral daily  atovaquone Suspension 1500 milliGRAM(s) Oral daily  Calcium Citrate + Vit D, 315 mg/200 Unit 2 Tablet(s) 2 Tablet(s) Oral two times a day  docusate sodium 100 milliGRAM(s) Oral three times a day  enoxaparin Injectable 80 milliGRAM(s) SubCutaneous two times a day  famotidine    Tablet 20 milliGRAM(s) Oral daily  insulin lispro (HumaLOG) corrective regimen sliding scale   SubCutaneous three times a day before meals  insulin lispro (HumaLOG) corrective regimen sliding scale   SubCutaneous at bedtime  magnesium oxide 400 milliGRAM(s) Oral <User Schedule>  multivitamin 1 Tablet(s) Oral daily  mycophenolate mofetil 1000 milliGRAM(s) Oral <User Schedule>  nystatin    Suspension 146261 Unit(s) Oral every 6 hours  pravastatin 20 milliGRAM(s) Oral at bedtime  silver sulfADIAZINE 1% Cream 1 Application(s) Topical two times a day  sodium bicarbonate 650 milliGRAM(s) Oral three times a day  tacrolimus 10 milliGRAM(s) Oral <User Schedule>  tacrolimus 9 milliGRAM(s) Oral <User Schedule>  valGANciclovir 450 milliGRAM(s) Oral daily    Vitals:  T(C): 36.1 (18 @ 15:49), Max: 36.7 (18 @ 19:37)  HR: 104 (18 @ 15:49) (104 - 112)  BP: 117/83 (18 @ 15:49) (117/83 - 145/95)  BP(mean): 115 (18 @ 07:48) (105 - 115)  RR: 18 (18 @ 15:49) (18 - 18)  SpO2: 97% (18 @ 15:49) (96% - 97%)    Daily     Daily Weight in k.9 (31 Mar 2018 06:32)    I&O's Summary    30 Mar 2018 07:01  -  31 Mar 2018 07:00  --------------------------------------------------------  IN: 720 mL / OUT: 600 mL / NET: 120 mL    31 Mar 2018 07:01  -  31 Mar 2018 18:02  --------------------------------------------------------  IN: 0 mL / OUT: 750 mL / NET: -750 mL    Physical Exam:  Appearance: No Acute Distress  HEENT: No JVD  Cardiovascular: Normal S1 S2, No murmurs/rubs/gallops  Respiratory: Clear to auscultation bilaterally  Gastrointestinal: Soft, Non-tender	  Skin: No cyanosis	  Neurologic: Non-focal  Extremities: No LE edema  Psychiatry: A & O x 3, Mood & affect appropriate    Labs:                        9.0    18.8  )-----------( 363      ( 31 Mar 2018 07:31 )             28.8     -    143  |  107  |  31<H>  ----------------------------<  90  5.0   |  24  |  1.45<H>    Ca    8.6      31 Mar 2018 07:31  Mg     1.5         TPro  6.1  /  Alb  3.3  /  TBili  0.6  /  DBili  x   /  AST  25  /  ALT  51<H>  /  AlkPhos  85

## 2018-03-31 NOTE — PROGRESS NOTE ADULT - SUBJECTIVE AND OBJECTIVE BOX
[facility] DIVISION OF KIDNEY DISEASES AND HYPERTENSION -- FOLLOW UP NOTE  --------------------------------------------------------------------------------  Chief Complaint:  NORMAN    24 hour events/subjective:  Patient sleeping comfortably sitting upright in chair.        PAST HISTORY  --------------------------------------------------------------------------------  No significant changes to PMH, PSH, FHx, SHx, unless otherwise noted    ALLERGIES & MEDICATIONS  --------------------------------------------------------------------------------  Allergies    No Known Allergies    Intolerances      Standing Inpatient Medications  ALBUTerol/ipratropium for Nebulization 3 milliLiter(s) Nebulizer every 6 hours  aspirin enteric coated 81 milliGRAM(s) Oral daily  atovaquone Suspension 1500 milliGRAM(s) Oral daily  Calcium Citrate + Vit D, 315 mg/200 Unit 2 Tablet(s) 2 Tablet(s) Oral two times a day  diltiazem    milliGRAM(s) Oral daily  docusate sodium 100 milliGRAM(s) Oral three times a day  enoxaparin Injectable 80 milliGRAM(s) SubCutaneous two times a day  famotidine    Tablet 20 milliGRAM(s) Oral daily  insulin lispro (HumaLOG) corrective regimen sliding scale   SubCutaneous three times a day before meals  insulin lispro (HumaLOG) corrective regimen sliding scale   SubCutaneous at bedtime  magnesium oxide 400 milliGRAM(s) Oral <User Schedule>  multivitamin 1 Tablet(s) Oral daily  mycophenolate mofetil 1000 milliGRAM(s) Oral <User Schedule>  nystatin    Suspension 139990 Unit(s) Oral every 6 hours  pravastatin 20 milliGRAM(s) Oral at bedtime  silver sulfADIAZINE 1% Cream 1 Application(s) Topical two times a day  sodium bicarbonate 650 milliGRAM(s) Oral three times a day  tacrolimus 10 milliGRAM(s) Oral <User Schedule>  tacrolimus 9 milliGRAM(s) Oral <User Schedule>  valGANciclovir 450 milliGRAM(s) Oral daily    PRN Inpatient Medications  acetaminophen   Tablet. 650 milliGRAM(s) Oral every 6 hours PRN      REVIEW OF SYSTEMS  --------------------------------------------------------------------------------  Gen: No fevers/chills  Skin: No rashes  Head/Eyes/Ears/Mouth: No headache  Respiratory: No dyspnea  CV: No chest pain  GI: No abdominal pain  : No dysuria  MSK: No edema  Neuro: No dizziness/lightheadedness    VITALS/PHYSICAL EXAM  --------------------------------------------------------------------------------  T(C): 36.4 (03-31-18 @ 03:47), Max: 36.7 (03-30-18 @ 07:45)  HR: 104 (03-31-18 @ 03:47) (97 - 112)  BP: 137/90 (03-31-18 @ 03:47) (124/79 - 137/90)  RR: 18 (03-31-18 @ 03:47) (18 - 18)  SpO2: 96% (03-30-18 @ 19:37) (96% - 96%)  Wt(kg): --        03-30-18 @ 07:01  -  03-31-18 @ 07:00  --------------------------------------------------------  IN: 720 mL / OUT: 600 mL / NET: 120 mL      Physical Exam:  	Gen: NAD  	HEENT: MMM  	Pulm: CTA B/L, no rales or wheeze audible  	CV: RRR, S1S2; no rub  	Abd: +BS, soft, nontender/nondistended  	: No suprapubic tenderness  	UE:  no edema  	LE:  no pitting edema  	Neuro: No focal deficits  	Psych: somnolent  	Skin: Warm    LABS/STUDIES  --------------------------------------------------------------------------------              8.8    20.3  >-----------<  387      [03-30-18 @ 07:08]              27.7     143  |  108  |  38  ----------------------------<  125      [03-30-18 @ 15:13]  4.7   |  25  |  1.50        Ca     8.9     [03-30-18 @ 15:13]      Mg     1.7     [03-30-18 @ 07:08]    TPro  6.2  /  Alb  3.3  /  TBili  0.6  /  DBili  x   /  AST  28  /  ALT  51  /  AlkPhos  91  [03-30-18 @ 07:08]    PT/INR: PT 12.5 , INR 1.15       [03-29-18 @ 07:21]  PTT: 50.2       [03-29-18 @ 07:21]      Creatinine Trend:  SCr 1.50 [03-30 @ 15:13]  SCr 1.63 [03-30 @ 07:08]  SCr 1.59 [03-29 @ 07:21]  SCr 1.69 [03-28 @ 07:15]  SCr 1.82 [03-27 @ 07:19]

## 2018-03-31 NOTE — PROGRESS NOTE ADULT - SUBJECTIVE AND OBJECTIVE BOX
Asked to follow up on patients right MF  Wound stable and closing dorsal surface  No pain or tenderness  Recc   OT for ROM and edema management  Out pt FU after DC  Rheum FU

## 2018-03-31 NOTE — PROGRESS NOTE ADULT - PROBLEM SELECTOR PLAN 7
increase cardizem to 360 mg daily  Continue Asa 81 mg po daily   Continue Pravachol 20 mg po daily   Continue Calcium Citrate + Vit D,  twice daily   Continue max oxide 400 mg PO BID. Monitor daily Mg.  Continue MVI daily  Hep C donor organ - follow up with hepatology outpatient following discharge.

## 2018-04-01 ENCOUNTER — OUTPATIENT (OUTPATIENT)
Dept: OUTPATIENT SERVICES | Facility: HOSPITAL | Age: 68
LOS: 1 days | End: 2018-04-01
Payer: MEDICAID

## 2018-04-01 DIAGNOSIS — Z95.811 PRESENCE OF HEART ASSIST DEVICE: Chronic | ICD-10-CM

## 2018-04-01 LAB
ALBUMIN SERPL ELPH-MCNC: 3.2 G/DL — LOW (ref 3.3–5)
ALP SERPL-CCNC: 88 U/L — SIGNIFICANT CHANGE UP (ref 40–120)
ALT FLD-CCNC: 47 U/L RC — HIGH (ref 10–45)
ANION GAP SERPL CALC-SCNC: 11 MMOL/L — SIGNIFICANT CHANGE UP (ref 5–17)
APTT BLD: 38.9 SEC — HIGH (ref 27.5–37.4)
AST SERPL-CCNC: 24 U/L — SIGNIFICANT CHANGE UP (ref 10–40)
BILIRUB SERPL-MCNC: 0.6 MG/DL — SIGNIFICANT CHANGE UP (ref 0.2–1.2)
BUN SERPL-MCNC: 28 MG/DL — HIGH (ref 7–23)
CALCIUM SERPL-MCNC: 8.9 MG/DL — SIGNIFICANT CHANGE UP (ref 8.4–10.5)
CHLORIDE SERPL-SCNC: 108 MMOL/L — SIGNIFICANT CHANGE UP (ref 96–108)
CO2 SERPL-SCNC: 23 MMOL/L — SIGNIFICANT CHANGE UP (ref 22–31)
CREAT SERPL-MCNC: 1.48 MG/DL — HIGH (ref 0.5–1.3)
GLUCOSE BLDC GLUCOMTR-MCNC: 83 MG/DL — SIGNIFICANT CHANGE UP (ref 70–99)
GLUCOSE BLDC GLUCOMTR-MCNC: 90 MG/DL — SIGNIFICANT CHANGE UP (ref 70–99)
GLUCOSE BLDC GLUCOMTR-MCNC: 93 MG/DL — SIGNIFICANT CHANGE UP (ref 70–99)
GLUCOSE BLDC GLUCOMTR-MCNC: 95 MG/DL — SIGNIFICANT CHANGE UP (ref 70–99)
GLUCOSE SERPL-MCNC: 95 MG/DL — SIGNIFICANT CHANGE UP (ref 70–99)
HCT VFR BLD CALC: 30.3 % — LOW (ref 39–50)
HGB BLD-MCNC: 9.6 G/DL — LOW (ref 13–17)
INR BLD: 1.22 RATIO — HIGH (ref 0.88–1.16)
MAGNESIUM SERPL-MCNC: 1.6 MG/DL — SIGNIFICANT CHANGE UP (ref 1.6–2.6)
MCHC RBC-ENTMCNC: 30.1 PG — SIGNIFICANT CHANGE UP (ref 27–34)
MCHC RBC-ENTMCNC: 31.8 GM/DL — LOW (ref 32–36)
MCV RBC AUTO: 94.7 FL — SIGNIFICANT CHANGE UP (ref 80–100)
PLATELET # BLD AUTO: 337 K/UL — SIGNIFICANT CHANGE UP (ref 150–400)
POTASSIUM SERPL-MCNC: 4.7 MMOL/L — SIGNIFICANT CHANGE UP (ref 3.5–5.3)
POTASSIUM SERPL-SCNC: 4.7 MMOL/L — SIGNIFICANT CHANGE UP (ref 3.5–5.3)
PROT SERPL-MCNC: 6 G/DL — SIGNIFICANT CHANGE UP (ref 6–8.3)
PROTHROM AB SERPL-ACNC: 13.2 SEC — HIGH (ref 9.8–12.7)
RBC # BLD: 3.2 M/UL — LOW (ref 4.2–5.8)
RBC # FLD: 19.6 % — HIGH (ref 10.3–14.5)
SODIUM SERPL-SCNC: 142 MMOL/L — SIGNIFICANT CHANGE UP (ref 135–145)
TACROLIMUS SERPL-MCNC: 10.1 NG/ML — SIGNIFICANT CHANGE UP
WBC # BLD: 16.1 K/UL — HIGH (ref 3.8–10.5)
WBC # FLD AUTO: 16.1 K/UL — HIGH (ref 3.8–10.5)

## 2018-04-01 PROCEDURE — 71045 X-RAY EXAM CHEST 1 VIEW: CPT | Mod: 26

## 2018-04-01 PROCEDURE — G9001: CPT

## 2018-04-01 RX ORDER — TACROLIMUS 5 MG/1
10 CAPSULE ORAL AT BEDTIME
Qty: 0 | Refills: 0 | Status: DISCONTINUED | OUTPATIENT
Start: 2018-04-01 | End: 2018-04-02

## 2018-04-01 RX ORDER — MAGNESIUM OXIDE 400 MG ORAL TABLET 241.3 MG
800 TABLET ORAL EVERY 12 HOURS
Qty: 0 | Refills: 0 | Status: DISCONTINUED | OUTPATIENT
Start: 2018-04-01 | End: 2018-04-06

## 2018-04-01 RX ADMIN — MAGNESIUM OXIDE 400 MG ORAL TABLET 800 MILLIGRAM(S): 241.3 TABLET ORAL at 17:50

## 2018-04-01 RX ADMIN — MYCOPHENOLATE MOFETIL 1000 MILLIGRAM(S): 250 CAPSULE ORAL at 08:00

## 2018-04-01 RX ADMIN — TACROLIMUS 10 MILLIGRAM(S): 5 CAPSULE ORAL at 20:05

## 2018-04-01 RX ADMIN — Medication 3 MILLILITER(S): at 23:07

## 2018-04-01 RX ADMIN — Medication 500000 UNIT(S): at 11:59

## 2018-04-01 RX ADMIN — Medication 3 MILLILITER(S): at 17:49

## 2018-04-01 RX ADMIN — FAMOTIDINE 20 MILLIGRAM(S): 10 INJECTION INTRAVENOUS at 11:59

## 2018-04-01 RX ADMIN — Medication 3 MILLILITER(S): at 05:28

## 2018-04-01 RX ADMIN — TACROLIMUS 10 MILLIGRAM(S): 5 CAPSULE ORAL at 08:00

## 2018-04-01 RX ADMIN — Medication 1 APPLICATION(S): at 17:50

## 2018-04-01 RX ADMIN — Medication 81 MILLIGRAM(S): at 11:59

## 2018-04-01 RX ADMIN — ENOXAPARIN SODIUM 80 MILLIGRAM(S): 100 INJECTION SUBCUTANEOUS at 05:28

## 2018-04-01 RX ADMIN — Medication 20 MILLIGRAM(S): at 23:07

## 2018-04-01 RX ADMIN — Medication 500000 UNIT(S): at 17:49

## 2018-04-01 RX ADMIN — MYCOPHENOLATE MOFETIL 1000 MILLIGRAM(S): 250 CAPSULE ORAL at 20:05

## 2018-04-01 RX ADMIN — Medication 1 TABLET(S): at 11:59

## 2018-04-01 RX ADMIN — ENOXAPARIN SODIUM 80 MILLIGRAM(S): 100 INJECTION SUBCUTANEOUS at 17:49

## 2018-04-01 RX ADMIN — Medication 3 MILLILITER(S): at 11:59

## 2018-04-01 RX ADMIN — Medication 360 MILLIGRAM(S): at 05:29

## 2018-04-01 RX ADMIN — VALGANCICLOVIR 450 MILLIGRAM(S): 450 TABLET, FILM COATED ORAL at 11:59

## 2018-04-01 RX ADMIN — Medication 650 MILLIGRAM(S): at 05:28

## 2018-04-01 RX ADMIN — Medication 650 MILLIGRAM(S): at 15:07

## 2018-04-01 RX ADMIN — ATOVAQUONE 1500 MILLIGRAM(S): 750 SUSPENSION ORAL at 11:59

## 2018-04-01 RX ADMIN — Medication 1 APPLICATION(S): at 05:28

## 2018-04-01 RX ADMIN — MAGNESIUM OXIDE 400 MG ORAL TABLET 400 MILLIGRAM(S): 241.3 TABLET ORAL at 08:00

## 2018-04-01 RX ADMIN — Medication 500000 UNIT(S): at 05:28

## 2018-04-01 RX ADMIN — Medication 650 MILLIGRAM(S): at 23:07

## 2018-04-01 RX ADMIN — Medication 500000 UNIT(S): at 23:07

## 2018-04-01 NOTE — PROGRESS NOTE ADULT - ASSESSMENT
67 year old man with ACC/AHA stage D chronic systolic heart failure due to NICM s/p HM2 LVAD 6/17 c/b pump thrombosis now s/p heart transplant on 2/23 (CMV D-/R+ and Toxo D-/R+), with post-op course c/b primary graft dysfunction, initially thought to be immune-mediated due to positive B-cell flow (negative CDC cross match) and received plasmapharesis/IVIg x2 with improvement in LV function. His course has been complicated by bilateral IJ/subclavian thrombi. He currently has acute renal failure of unclear etiology. However, he does not have clear evidence of allograft dysfunction or low output on exam. 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     # Immunosuppression:  Tacrolimus -  Will continue dosing at 9 mg BID (increased yesterday). Tacro level 7 this morning from 9.4 Target trough level of 12-14. - - cardizem  240 qd  CellCept - continue 1000 mg PO BID.   Steroids - will give short pulse of oral steroids. Will give 50 mg PO BID for three days with subsequent taper.  Taper starting 3/24 45 mg x2 the 40 x2  decrease by 10  until 15 mg bid- ordered    # Antimicrobial prophylaxis:  PCP - continue Mepron 1500mg PO daily      # Bilateral IJ/Subclavian thrombi - unchanged on f/u U/S 3/12  -   - CHERIE negative    #Acute renal failure, possibly related to vancomycin dosing, improving.   - Vanco stopped.   - No further diuresis.     # CAV PPx  - Continue ECASA 81mg PO daily  - Continue pravastatin 20mg PO QHS    # ID   - Pseudomonas in sputum - course of cefepime completed 3/15  - DLES improved and vancomycin stopped given high trough with NORMAN. Per ID, will redose with daptomycin depending upon repeat vanc level this morning.   - silver sulfadizaine dressings per plastics for R 3rd digit.   - HCV viral load and PCR per protocol. Therapy to be initiated as outpatient by Dr. Thomas (Hepatology)  - Transplant ID following      3/26 Pt for repeat UE thrombus as per HF, r/o propagation  Creat is improving may need medications adjustment  Ambulate  3/27 Biopsy now scheduled for Wednesday. April 4    UE duplex with new innominate vein partial thrombus, cont anticoagulation, now therapeutic. D/w Dr Tavarez     3/28 prograf increased to 9mg bid.  Renal function continues to improve  Will resume hep sq tonight after biopsy this am  3/29 - Prograf 0.5mg po x 1 stat & increase Prograf to 9.5mg po bid  increase CardizemCD to 300mg po daily - received CardizemCD 240 this am therefore Cardizem 60mg po x 1 given this am  CXR w/ RLL haziness - will ultrasound  R LL today  36/30 Hyperkalemia - kayexalate 30 x1  Na bicarb added  Creat improved, now on lovenox  3/31   feeling well today,  VSS, TAC level 10.0, education by transplant coordinator with family, potassium stable  5.0,   cardizem to 360 Qd  4/1    VSS   5 beats wct in NSR,    LAVD site with VAC 67 year old man with ACC/AHA stage D chronic systolic heart failure due to NICM s/p HM2 LVAD 6/17 c/b pump thrombosis now s/p heart transplant on 2/23 (CMV D-/R+ and Toxo D-/R+), with post-op course c/b primary graft dysfunction, initially thought to be immune-mediated due to positive B-cell flow (negative CDC cross match) and received plasmapharesis/IVIg x2 with improvement in LV function. His course has been complicated by bilateral IJ/subclavian thrombi. He currently has acute renal failure of unclear etiology. However, he does not have clear evidence of allograft dysfunction or low output on exam. 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     # Immunosuppression:  Tacrolimus -  Will continue dosing at 9 mg BID (increased yesterday). Tacro level 7 this morning from 9.4 Target trough level of 12-14. - - cardizem  240 qd  CellCept - continue 1000 mg PO BID.   Steroids - will give short pulse of oral steroids. Will give 50 mg PO BID for three days with subsequent taper.  Taper starting 3/24 45 mg x2 the 40 x2  decrease by 10  until 15 mg bid- ordered    # Antimicrobial prophylaxis:  PCP - continue Mepron 1500mg PO daily      # Bilateral IJ/Subclavian thrombi - unchanged on f/u U/S 3/12  -   - CHERIE negative    #Acute renal failure, possibly related to vancomycin dosing, improving.   - Vanco stopped.   - No further diuresis.     # CAV PPx  - Continue ECASA 81mg PO daily  - Continue pravastatin 20mg PO QHS    # ID   - Pseudomonas in sputum - course of cefepime completed 3/15  - DLES improved and vancomycin stopped given high trough with NORMAN. Per ID, will redose with daptomycin depending upon repeat vanc level this morning.   - silver sulfadizaine dressings per plastics for R 3rd digit.   - HCV viral load and PCR per protocol. Therapy to be initiated as outpatient by Dr. Thomas (Hepatology)  - Transplant ID following      3/26 Pt for repeat UE thrombus as per HF, r/o propagation  Creat is improving may need medications adjustment  Ambulate  3/27 Biopsy now scheduled for Wednesday. April 4    UE duplex with new innominate vein partial thrombus, cont anticoagulation, now therapeutic. D/w Dr Tavarez     3/28 prograf increased to 9mg bid.  Renal function continues to improve  Will resume hep sq tonight after biopsy this am  3/29 - Prograf 0.5mg po x 1 stat & increase Prograf to 9.5mg po bid  increase CardizemCD to 300mg po daily - received CardizemCD 240 this am therefore Cardizem 60mg po x 1 given this am  CXR w/ RLL haziness - will ultrasound  R LL today  36/30 Hyperkalemia - kayexalate 30 x1  Na bicarb added  Creat improved, now on lovenox  3/31   feeling well today,  VSS, TAC level 10.0, education by transplant coordinator with family, potassium stable  5.0,   cardizem to 360 Qd  4/1    VSS   5 beats wct in NSR,    LAVD site with VAC,   WBC   16.   afebrile 67 year old man with ACC/AHA stage D chronic systolic heart failure due to NICM s/p HM2 LVAD 6/17 c/b pump thrombosis now s/p heart transplant on 2/23 (CMV D-/R+ and Toxo D-/R+), with post-op course c/b primary graft dysfunction, initially thought to be immune-mediated due to positive B-cell flow (negative CDC cross match) and received plasmapharesis/IVIg x2 with improvement in LV function. His course has been complicated by bilateral IJ/subclavian thrombi. He currently has acute renal failure of unclear etiology. However, he does not have clear evidence of allograft dysfunction or low output on exam. 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     # Immunosuppression:  Tacrolimus -  Will continue dosing at 9 mg BID (increased yesterday). Tacro level 7 this morning from 9.4 Target trough level of 12-14. - - cardizem  240 qd  CellCept - continue 1000 mg PO BID.   Steroids - will give short pulse of oral steroids. Will give 50 mg PO BID for three days with subsequent taper.  Taper starting 3/24 45 mg x2 the 40 x2  decrease by 10  until 15 mg bid- ordered    # Antimicrobial prophylaxis:  PCP - continue Mepron 1500mg PO daily      # Bilateral IJ/Subclavian thrombi - unchanged on f/u U/S 3/12  -   - CHERIE negative    #Acute renal failure, possibly related to vancomycin dosing, improving.   - Vanco stopped.   - No further diuresis.     # CAV PPx  - Continue ECASA 81mg PO daily  - Continue pravastatin 20mg PO QHS    # ID   - Pseudomonas in sputum - course of cefepime completed 3/15  - DLES improved and vancomycin stopped given high trough with NORMAN. Per ID, will redose with daptomycin depending upon repeat vanc level this morning.   - silver sulfadizaine dressings per plastics for R 3rd digit.   - HCV viral load and PCR per protocol. Therapy to be initiated as outpatient by Dr. Thomas (Hepatology)  - Transplant ID following      3/26 Pt for repeat UE thrombus as per HF, r/o propagation  Creat is improving may need medications adjustment  Ambulate  3/27 Biopsy now scheduled for Wednesday. April 4    UE duplex with new innominate vein partial thrombus, cont anticoagulation, now therapeutic. D/w Dr Tavarez     3/28 prograf increased to 9mg bid.  Renal function continues to improve  Will resume hep sq tonight after biopsy this am  3/29 - Prograf 0.5mg po x 1 stat & increase Prograf to 9.5mg po bid  increase CardizemCD to 300mg po daily - received CardizemCD 240 this am therefore Cardizem 60mg po x 1 given this am  CXR w/ RLL haziness - will ultrasound  R LL today  36/30 Hyperkalemia - kayexalate 30 x1  Na bicarb added  Creat improved, now on lovenox  3/31   feeling well today,  VSS, TAC level 10.0, education by transplant coordinator with family, potassium stable  5.0,   cardizem to 360 Qd  4/1    VSS   5 beats wct in NSR,    LAVD site with VAC,   WBC   16.   afebrile,   TAC level   10.1

## 2018-04-01 NOTE — PROGRESS NOTE ADULT - PROBLEM SELECTOR PLAN 2
Tacro level this morning 10 (stable)  Increase tacro 10mg at 0800 and 10mg at 2000. Daily tacro level prior to AM dose to be sent to STAT lab.  Continue CellCept 1000 mg BID  Continue prednisone taper  Ongoing teaching and reinforcement

## 2018-04-01 NOTE — PROGRESS NOTE ADULT - PROBLEM SELECTOR PLAN 7
continue cardizem to 360 mg daily  Continue Asa 81 mg po daily   Continue Pravachol 20 mg po daily   Continue Calcium Citrate + Vit D,  twice daily   increase max oxide 800 mg PO BID. Monitor daily Mg.  Continue MVI daily  Hep C donor organ - follow up with hepatology outpatient following discharge.

## 2018-04-01 NOTE — PROGRESS NOTE ADULT - SUBJECTIVE AND OBJECTIVE BOX
Interval History:  doing well  denies complaints    Medications:  acetaminophen   Tablet. 650 milliGRAM(s) Oral every 6 hours PRN  ALBUTerol/ipratropium for Nebulization 3 milliLiter(s) Nebulizer every 6 hours  aspirin enteric coated 81 milliGRAM(s) Oral daily  atovaquone Suspension 1500 milliGRAM(s) Oral daily  Calcium Citrate + Vit D, 315 mg/200 Unit 2 Tablet(s) 2 Tablet(s) Oral two times a day  diltiazem    milliGRAM(s) Oral daily  docusate sodium 100 milliGRAM(s) Oral three times a day  enoxaparin Injectable 80 milliGRAM(s) SubCutaneous two times a day  famotidine    Tablet 20 milliGRAM(s) Oral daily  insulin lispro (HumaLOG) corrective regimen sliding scale   SubCutaneous three times a day before meals  insulin lispro (HumaLOG) corrective regimen sliding scale   SubCutaneous at bedtime  magnesium oxide 800 milliGRAM(s) Oral every 12 hours  multivitamin 1 Tablet(s) Oral daily  mycophenolate mofetil 1000 milliGRAM(s) Oral <User Schedule>  nystatin    Suspension 218712 Unit(s) Oral every 6 hours  pravastatin 20 milliGRAM(s) Oral at bedtime  silver sulfADIAZINE 1% Cream 1 Application(s) Topical two times a day  sodium bicarbonate 650 milliGRAM(s) Oral three times a day  tacrolimus 10 milliGRAM(s) Oral <User Schedule>  tacrolimus 10 milliGRAM(s) Oral at bedtime  valGANciclovir 450 milliGRAM(s) Oral daily      Vitals:  T(C): 36.8 (18 @ 19:30), Max: 37.1 (18 @ 15:11)  HR: 105 (18 @ 19:30) (105 - 107)  BP: 127/88 (18 @ 19:30) (108/76 - 127/88)  BP(mean): 103 (18 @ 19:30) (94 - 103)  RR: 18 (18 @ 19:30) (18 - 18)  SpO2: 97% (18 @ 19:30) (96% - 99%)    Daily     Daily Weight in k.9 (2018 04:00)    I&O's Summary    31 Mar 2018 07:01  -  2018 07:00  --------------------------------------------------------  IN: 480 mL / OUT: 1350 mL / NET: -870 mL    Physical Exam:  Appearance: No Acute Distress  HEENT: No JVD  Cardiovascular: Normal S1 S2, No murmurs/rubs/gallops  Respiratory: Clear to auscultation bilaterally  Gastrointestinal: Soft, Non-tender	  Skin: No cyanosis	  Neurologic: Non-focal  Extremities: No LE edema  Psychiatry: A & O x 3, Mood & affect appropriate    Labs:                        9.6    16.1  )-----------( 337      ( 2018 07:30 )             30.3     04-01    142  |  108  |  28<H>  ----------------------------<  95  4.7   |  23  |  1.48<H>    Ca    8.9      2018 07:30  Mg     1.6     -    TPro  6.0  /  Alb  3.2<L>  /  TBili  0.6  /  DBili  x   /  AST  24  /  ALT  47<H>  /  AlkPhos  88  04-    PT/INR - ( 2018 07:30 )   PT: 13.2 sec;   INR: 1.22 ratio         PTT - ( 2018 07:30 )  PTT:38.9 sec

## 2018-04-01 NOTE — PROGRESS NOTE ADULT - SUBJECTIVE AND OBJECTIVE BOX
VITAL SIGNS    Telemetry:  SR   5 beats wct    Vital Signs Last 24 Hrs  T(C): 36.8 (18 @ 08:00), Max: 36.8 (18 @ 08:00)  T(F): 98.2 (18 @ 08:00), Max: 98.2 (18 @ 08:00)  HR: 106 (18 @ 08:00) (104 - 108)  BP: 108/76 (18 @ 08:00) (108/76 - 126/84)  RR: 18 (18 @ 08:00) (18 - 18)  SpO2: 99% (18 @ 08:00) (96% - 99%)             Daily Weight in k.9 (2018 04:00)      Bilirubin Total, Serum: 0.6 mg/dL ( @ 07:30)    CAPILLARY BLOOD GLUCOSE      POCT Blood Glucose.: 95 mg/dL (2018 11:55)  POCT Blood Glucose.: 93 mg/dL (2018 07:56)  POCT Blood Glucose.: 141 mg/dL (31 Mar 2018 21:43)  POCT Blood Glucose.: 134 mg/dL (31 Mar 2018 19:32)  POCT Blood Glucose.: 112 mg/dL (31 Mar 2018 12:37)          Drains:      LVAD driveline  site    site to     vac  suction                     PHYSICAL EXAM  S    " I am not walking today "  Neurology: alert and oriented x 3, moves all extremities with no defecits  CV :  RRR  Sternal Wound :  CDI , Stable,    lvad site to VAC  Lungs:   CTA B/L  Abdomen: soft, nontender, nondistended, positive bowel sounds,   Extremities:     rt middle tip of finger necrotic,  b.l no edema

## 2018-04-02 DIAGNOSIS — N28.1 CYST OF KIDNEY, ACQUIRED: ICD-10-CM

## 2018-04-02 LAB
ALBUMIN SERPL ELPH-MCNC: 3 G/DL — LOW (ref 3.3–5)
ALDOST SERPL-MCNC: 3.3 NG/DL — SIGNIFICANT CHANGE UP
ALP SERPL-CCNC: 75 U/L — SIGNIFICANT CHANGE UP (ref 40–120)
ALT FLD-CCNC: 35 U/L RC — SIGNIFICANT CHANGE UP (ref 10–45)
ANION GAP SERPL CALC-SCNC: 12 MMOL/L — SIGNIFICANT CHANGE UP (ref 5–17)
APTT BLD: 39.9 SEC — HIGH (ref 27.5–37.4)
AST SERPL-CCNC: 21 U/L — SIGNIFICANT CHANGE UP (ref 10–40)
BILIRUB SERPL-MCNC: 0.8 MG/DL — SIGNIFICANT CHANGE UP (ref 0.2–1.2)
BUN SERPL-MCNC: 23 MG/DL — SIGNIFICANT CHANGE UP (ref 7–23)
CALCIUM SERPL-MCNC: 8.7 MG/DL — SIGNIFICANT CHANGE UP (ref 8.4–10.5)
CHLORIDE SERPL-SCNC: 106 MMOL/L — SIGNIFICANT CHANGE UP (ref 96–108)
CO2 SERPL-SCNC: 23 MMOL/L — SIGNIFICANT CHANGE UP (ref 22–31)
CREAT SERPL-MCNC: 1.46 MG/DL — HIGH (ref 0.5–1.3)
GLUCOSE BLDC GLUCOMTR-MCNC: 84 MG/DL — SIGNIFICANT CHANGE UP (ref 70–99)
GLUCOSE BLDC GLUCOMTR-MCNC: 84 MG/DL — SIGNIFICANT CHANGE UP (ref 70–99)
GLUCOSE BLDC GLUCOMTR-MCNC: 96 MG/DL — SIGNIFICANT CHANGE UP (ref 70–99)
GLUCOSE BLDC GLUCOMTR-MCNC: 98 MG/DL — SIGNIFICANT CHANGE UP (ref 70–99)
GLUCOSE SERPL-MCNC: 91 MG/DL — SIGNIFICANT CHANGE UP (ref 70–99)
HCT VFR BLD CALC: 29.2 % — LOW (ref 39–50)
HGB BLD-MCNC: 9.1 G/DL — LOW (ref 13–17)
INR BLD: 1.29 RATIO — HIGH (ref 0.88–1.16)
MAGNESIUM SERPL-MCNC: 1.6 MG/DL — SIGNIFICANT CHANGE UP (ref 1.6–2.6)
MCHC RBC-ENTMCNC: 29.1 PG — SIGNIFICANT CHANGE UP (ref 27–34)
MCHC RBC-ENTMCNC: 31.2 GM/DL — LOW (ref 32–36)
MCV RBC AUTO: 93.4 FL — SIGNIFICANT CHANGE UP (ref 80–100)
PLATELET # BLD AUTO: 299 K/UL — SIGNIFICANT CHANGE UP (ref 150–400)
POTASSIUM SERPL-MCNC: 4.8 MMOL/L — SIGNIFICANT CHANGE UP (ref 3.5–5.3)
POTASSIUM SERPL-SCNC: 4.8 MMOL/L — SIGNIFICANT CHANGE UP (ref 3.5–5.3)
PROT SERPL-MCNC: 5.9 G/DL — LOW (ref 6–8.3)
PROTHROM AB SERPL-ACNC: 14 SEC — HIGH (ref 9.8–12.7)
RBC # BLD: 3.12 M/UL — LOW (ref 4.2–5.8)
RBC # FLD: 19.5 % — HIGH (ref 10.3–14.5)
RENIN DIRECT, PLASMA: 7.1 PG/ML — SIGNIFICANT CHANGE UP
SODIUM SERPL-SCNC: 141 MMOL/L — SIGNIFICANT CHANGE UP (ref 135–145)
TACROLIMUS SERPL-MCNC: 12.1 NG/ML — SIGNIFICANT CHANGE UP
WBC # BLD: 16.2 K/UL — HIGH (ref 3.8–10.5)
WBC # FLD AUTO: 16.2 K/UL — HIGH (ref 3.8–10.5)

## 2018-04-02 PROCEDURE — 99233 SBSQ HOSP IP/OBS HIGH 50: CPT

## 2018-04-02 PROCEDURE — 99232 SBSQ HOSP IP/OBS MODERATE 35: CPT

## 2018-04-02 PROCEDURE — 71045 X-RAY EXAM CHEST 1 VIEW: CPT | Mod: 26

## 2018-04-02 RX ORDER — TACROLIMUS 5 MG/1
10 CAPSULE ORAL
Qty: 0 | Refills: 0 | Status: DISCONTINUED | OUTPATIENT
Start: 2018-04-02 | End: 2018-04-04

## 2018-04-02 RX ORDER — MAGNESIUM OXIDE 400 MG ORAL TABLET 241.3 MG
2 TABLET ORAL
Qty: 120 | Refills: 1 | OUTPATIENT
Start: 2018-04-02 | End: 2018-05-31

## 2018-04-02 RX ORDER — DILTIAZEM HCL 120 MG
2 CAPSULE, EXT RELEASE 24 HR ORAL
Qty: 60 | Refills: 1 | OUTPATIENT
Start: 2018-04-02 | End: 2018-05-31

## 2018-04-02 RX ORDER — TACROLIMUS 5 MG/1
10 CAPSULE ORAL
Qty: 600 | Refills: 1 | OUTPATIENT
Start: 2018-04-02 | End: 2018-05-31

## 2018-04-02 RX ADMIN — Medication 81 MILLIGRAM(S): at 12:19

## 2018-04-02 RX ADMIN — MYCOPHENOLATE MOFETIL 1000 MILLIGRAM(S): 250 CAPSULE ORAL at 08:04

## 2018-04-02 RX ADMIN — Medication 500000 UNIT(S): at 06:21

## 2018-04-02 RX ADMIN — ENOXAPARIN SODIUM 80 MILLIGRAM(S): 100 INJECTION SUBCUTANEOUS at 06:21

## 2018-04-02 RX ADMIN — Medication 360 MILLIGRAM(S): at 06:21

## 2018-04-02 RX ADMIN — Medication 3 MILLILITER(S): at 06:21

## 2018-04-02 RX ADMIN — ENOXAPARIN SODIUM 80 MILLIGRAM(S): 100 INJECTION SUBCUTANEOUS at 17:44

## 2018-04-02 RX ADMIN — Medication 1 APPLICATION(S): at 17:44

## 2018-04-02 RX ADMIN — TACROLIMUS 10 MILLIGRAM(S): 5 CAPSULE ORAL at 08:05

## 2018-04-02 RX ADMIN — MYCOPHENOLATE MOFETIL 1000 MILLIGRAM(S): 250 CAPSULE ORAL at 20:00

## 2018-04-02 RX ADMIN — Medication 3 MILLILITER(S): at 17:43

## 2018-04-02 RX ADMIN — MAGNESIUM OXIDE 400 MG ORAL TABLET 800 MILLIGRAM(S): 241.3 TABLET ORAL at 06:21

## 2018-04-02 RX ADMIN — Medication 3 MILLILITER(S): at 12:18

## 2018-04-02 RX ADMIN — ATOVAQUONE 1500 MILLIGRAM(S): 750 SUSPENSION ORAL at 12:18

## 2018-04-02 RX ADMIN — Medication 1 APPLICATION(S): at 06:21

## 2018-04-02 RX ADMIN — Medication 1 TABLET(S): at 12:18

## 2018-04-02 RX ADMIN — Medication 500000 UNIT(S): at 12:18

## 2018-04-02 RX ADMIN — FAMOTIDINE 20 MILLIGRAM(S): 10 INJECTION INTRAVENOUS at 12:18

## 2018-04-02 RX ADMIN — Medication 500000 UNIT(S): at 17:46

## 2018-04-02 RX ADMIN — VALGANCICLOVIR 450 MILLIGRAM(S): 450 TABLET, FILM COATED ORAL at 12:18

## 2018-04-02 RX ADMIN — MAGNESIUM OXIDE 400 MG ORAL TABLET 800 MILLIGRAM(S): 241.3 TABLET ORAL at 17:45

## 2018-04-02 RX ADMIN — Medication 650 MILLIGRAM(S): at 06:21

## 2018-04-02 RX ADMIN — Medication 650 MILLIGRAM(S): at 14:01

## 2018-04-02 RX ADMIN — Medication 20 MILLIGRAM(S): at 22:00

## 2018-04-02 RX ADMIN — Medication 650 MILLIGRAM(S): at 22:00

## 2018-04-02 RX ADMIN — TACROLIMUS 10 MILLIGRAM(S): 5 CAPSULE ORAL at 20:03

## 2018-04-02 NOTE — PROGRESS NOTE ADULT - SUBJECTIVE AND OBJECTIVE BOX
[facility] DIVISION OF KIDNEY DISEASES AND HYPERTENSION -- PROGRESS NOTE    Chief complaint: NORMAN in heart transplant patient    24 hour events/subjective: no acute events noted        PAST HISTORY  --------------------------------------------------------------------------------  No significant changes to PMH, PSH, FHx, SHx, unless otherwise noted    ALLERGIES & MEDICATIONS  --------------------------------------------------------------------------------  Allergies    No Known Allergies    Intolerances      Standing Inpatient Medications  ALBUTerol/ipratropium for Nebulization 3 milliLiter(s) Nebulizer every 6 hours  aspirin enteric coated 81 milliGRAM(s) Oral daily  atovaquone Suspension 1500 milliGRAM(s) Oral daily  Calcium Citrate + Vit D, 315 mg/200 Unit 2 Tablet(s) 2 Tablet(s) Oral two times a day  diltiazem    milliGRAM(s) Oral daily  docusate sodium 100 milliGRAM(s) Oral three times a day  enoxaparin Injectable 80 milliGRAM(s) SubCutaneous two times a day  famotidine    Tablet 20 milliGRAM(s) Oral daily  insulin lispro (HumaLOG) corrective regimen sliding scale   SubCutaneous three times a day before meals  insulin lispro (HumaLOG) corrective regimen sliding scale   SubCutaneous at bedtime  magnesium oxide 800 milliGRAM(s) Oral every 12 hours  multivitamin 1 Tablet(s) Oral daily  mycophenolate mofetil 1000 milliGRAM(s) Oral <User Schedule>  nystatin    Suspension 419140 Unit(s) Oral every 6 hours  pravastatin 20 milliGRAM(s) Oral at bedtime  silver sulfADIAZINE 1% Cream 1 Application(s) Topical two times a day  sodium bicarbonate 650 milliGRAM(s) Oral three times a day  tacrolimus 10 milliGRAM(s) Oral <User Schedule>  valGANciclovir 450 milliGRAM(s) Oral daily    PRN Inpatient Medications  acetaminophen   Tablet. 650 milliGRAM(s) Oral every 6 hours PRN      REVIEW OF SYSTEMS  --------------------------------------------------------------------------------  Constitutional: [x ] no Fever [ ] Chills [ ] Fatigue [ ] Weight change   HEENT: [ ] Blurred vision [ ] Eye Pain [ ] Headache [ ] Runny nose [ ] Sore Throat   Respiratory: [x ]no Cough [ ] Wheezing [x ]no Shortness of breath  Cardiovascular: [x ]no Chest Pain [ ] Palpitations [ ] LOBATO [ ] PND [ ] Orthopnea  Gastrointestinal: [ ] Abdominal Pain [ ] Diarrhea [ ] Constipation [ ] Hemorrhoids [ ] Nausea [ ] Vomiting  Genitourinary: [ ] Nocturia [ ] Dysuria [ ] Incontinence  Extremities: [x ]no Swelling [ ] Joint Pain  Neurologic: [ ] Focal deficit [ ] Paresthesias [ ] Syncope  Lymphatic: [ ] Swelling [ ] Lymphadenopathy   Skin: [ ] Rash [ ] Ecchymoses [ ] Wounds [ ] Lesions  Psychiatry: [ ] Depression [ ] Suicidal/Homicidal Ideation [ ] Anxiety [ ] Sleep Disturbances  [x ] 10 point review of systems is otherwise negative except as mentioned above              [ ]Unable to obtain due to   All other systems were reviewed and are negative, except as noted.    VITALS/PHYSICAL EXAM  --------------------------------------------------------------------------------  T(C): 36.9 (04-02-18 @ 15:11), Max: 37.2 (04-02-18 @ 06:12)  HR: 107 (04-02-18 @ 15:11) (104 - 107)  BP: 98/69 (04-02-18 @ 15:11) (98/69 - 127/88)  RR: 18 (04-02-18 @ 15:11) (17 - 18)  SpO2: 99% (04-02-18 @ 15:11) (97% - 99%)  Wt(kg): --        04-01-18 @ 07:01  -  04-02-18 @ 07:00  --------------------------------------------------------  IN: 360 mL / OUT: 500 mL / NET: -140 mL    04-02-18 @ 07:01  -  04-02-18 @ 15:25  --------------------------------------------------------  IN: 720 mL / OUT: 540 mL / NET: 180 mL      Physical Exam:  	Gen: NAD, well-appearing  	HEENT: on room air  	Pulm: CTA B/L  	CV: normal S1S2; no rub  	Abd: soft                      Back : No sacral edema  	: No emily  	LE: no edema  	Skin: Warm, without rashes  	Vascular access:     LABS/STUDIES  --------------------------------------------------------------------------------              9.1    16.2  >-----------<  299      [04-02-18 @ 07:28]              29.2     141  |  106  |  23  ----------------------------<  91      [04-02-18 @ 07:28]  4.8   |  23  |  1.46        Ca     8.7     [04-02-18 @ 07:28]      Mg     1.6     [04-02-18 @ 07:28]    TPro  5.9  /  Alb  3.0  /  TBili  0.8  /  DBili  x   /  AST  21  /  ALT  35  /  AlkPhos  75  [04-02-18 @ 07:28]    PT/INR: PT 14.0 , INR 1.29       [04-02-18 @ 07:28]  PTT: 39.9       [04-02-18 @ 07:28]      Creatinine Trend:  SCr 1.46 [04-02 @ 07:28]  SCr 1.48 [04-01 @ 07:30]  SCr 1.45 [03-31 @ 07:31]  SCr 1.50 [03-30 @ 15:13]  SCr 1.63 [03-30 @ 07:08]    Urinalysis - [03-23-18 @ 16:03]      Color Yellow / Appearance Slightly Turbid / SG 1.020 / pH 5.0      Gluc Negative / Ketone Negative  / Bili Negative / Urobili Negative       Blood Small / Protein 30 / Leuk Est Small / Nitrite Negative      RBC 5 / WBC 8 / Hyaline 0 / Gran  / Sq Epi  / Non Sq Epi 4 / Bacteria Few      Iron 22, TIBC 182, %sat 12      [02-13-18 @ 12:44]  Ferritin 79.0      [02-13-18 @ 12:44]  HbA1c 5.3      [03-18-18 @ 11:20]  TSH 1.48      [02-27-18 @ 08:13]  Lipid: chol 62, TG 33, HDL 17, LDL 38      [06-07-17 @ 06:14]      C3 Complement 135      [03-19-18 @ 15:13]  C4 Complement 37      [03-19-18 @ 15:13]  Free Light Chains: kappa 4.36, lambda 5.71, ratio = 0.76      [03-19 @ 15:13]  Immunofixation Serum:   No Monoclonal Band Identified      [03-19-18 @ 15:13]  Cryoglobulin: Negative      [03-19-18 @ 15:13]

## 2018-04-02 NOTE — CHART NOTE - NSCHARTNOTEFT_GEN_A_CORE
Source: Patient [ x ]    Family [ ]     other [ x ] RN, Comprehensive chart review, pt discussed during cardiothoracic surgery rounds     Pt seen for nutrition follow up. Reports good appetite and eating 100% of meals. However, drinking 1 Ensure Enlive daily as he feels PO intake of meals is adequate. Denies GI distress with last BM today. Assessed knowledge of nutrition guidelines for after heart transplant- pt able to teach-back food/drug interaction, food safety, high potassium foods to avoid, and heart healthy diet. However, pt did need assistance/reminding with teach-back. Pt has written materials and has RD's contact info. Pt reminded that RD remains available as needed once he is discharged.     Chart reviewed- s/p heart transplant on 2/23, post-op course c/b primary graft dysfunction and received plasmapharesis/IVIg x2 with improvement in LV function, bilateral IJ/subclavian thrombi; with increased lymphocytes seen on the fourth biopsy, s/p fifth biopsy 3/28; NORMAN-improving, hyperkalemia likely due to increasing prograf levels-resolving.     Diet : regular, low fiber, no concentrated potassium, Ensure Enlive 3 cans/day       Admission Weight: 78.9kg  Current Weight: 73.5kg  Weight fluctuations noted, likely due to fluid shifts. Pt with 2+right arm edema.     Pertinent Medications: MEDICATIONS  (STANDING):  ALBUTerol/ipratropium for Nebulization 3 milliLiter(s) Nebulizer every 6 hours  aspirin enteric coated 81 milliGRAM(s) Oral daily  atovaquone Suspension 1500 milliGRAM(s) Oral daily  Calcium Citrate + Vit D, 315 mg/200 Unit 2 Tablet(s) 2 Tablet(s) Oral two times a day  diltiazem    milliGRAM(s) Oral daily  docusate sodium 100 milliGRAM(s) Oral three times a day  enoxaparin Injectable 80 milliGRAM(s) SubCutaneous two times a day  famotidine    Tablet 20 milliGRAM(s) Oral daily  insulin lispro (HumaLOG) corrective regimen sliding scale   SubCutaneous three times a day before meals  insulin lispro (HumaLOG) corrective regimen sliding scale   SubCutaneous at bedtime  magnesium oxide 800 milliGRAM(s) Oral every 12 hours  multivitamin 1 Tablet(s) Oral daily  mycophenolate mofetil 1000 milliGRAM(s) Oral <User Schedule>  nystatin    Suspension 786162 Unit(s) Oral every 6 hours  pravastatin 20 milliGRAM(s) Oral at bedtime  silver sulfADIAZINE 1% Cream 1 Application(s) Topical two times a day  sodium bicarbonate 650 milliGRAM(s) Oral three times a day  tacrolimus 10 milliGRAM(s) Oral <User Schedule>  valGANciclovir 450 milliGRAM(s) Oral daily    MEDICATIONS  (PRN):  acetaminophen   Tablet. 650 milliGRAM(s) Oral every 6 hours PRN Mild Pain (1 - 3)    Pertinent Labs:    Complete Blood Count (04.02.18 @ 07:28)    Hemoglobin: 9.1 g/dL - low    Hematocrit: 29.2 % - low  Comprehensive Metabolic Panel (04.02.18 @ 07:28)    Sodium, Serum: 141 mmol/L    Potassium, Serum: 4.8 mmol/L    Chloride, Serum: 106 mmol/L    Carbon Dioxide, Serum: 23 mmol/L    Anion Gap, Serum: 12 mmol/L    Blood Urea Nitrogen, Serum: 23 mg/dL    Creatinine, Serum: 1.46 mg/dL - high    Glucose, Serum: 91 mg/dL    Calcium, Total Serum: 8.7 mg/dL    Protein Total, Serum: 5.9 g/dL - low    Albumin, Serum: 3.0 g/dL - low    Bilirubin Total, Serum: 0.8 mg/dL    Alkaline Phosphatase, Serum: 75 U/L    Aspartate Aminotransferase (AST/SGOT): 21 U/L    Alanine Aminotransferase (ALT/SGPT): 35 U/L RC  Magnesium, Serum (04.02.18 @ 07:28)    Magnesium, Serum: 1.6 mg/dL     Point of Care Testing BG:(4/2)84, (4/1)83-95        Skin: No pressure ulcers noted.    Estimated Needs:   [ x ] no change since previous assessment  [ ] recalculated:       Previous Nutrition Diagnosis:   Inadequate Oral Intake and Increased Nutrient Needs improving with PO diet and supplementation.  Food & Nutrition Related Knowledge Deficit improving, addressed with continued education.       New Nutrition Diagnosis: [ x ] not applicable      Interventions:   1. Encourage pt to maintain good PO intake of meals.   2. Encourage increased intake of Ensure Enlive to 2 cans/day if PO intake of meals decreases.  3. Provide food preferences within therapeutic diet when requested.   4. Encourage use of alternate menu items as needed.  5. Reviewed/reinforced heart healthy diet guidelines, high potassium foods to avoid, food safety, and food/drug interactions.   6. Pt made aware RD remains available as needed.        Monitoring and Evaluation:     [ x ] PO intake [ x ] Tolerance to diet prescription [ x ] weights [ x ] follow up per protocol    [ ] other:

## 2018-04-02 NOTE — PROGRESS NOTE ADULT - ASSESSMENT
67 year old man with ACC/AHA stage D chronic systolic heart failure due to NICM s/p HM2 LVAD 6/17 c/b pump thrombosis now s/p heart transplant on 2/23 (CMV D-/R+ and Toxo D-/R+), with post-op course c/b primary graft dysfunction, initially thought to be immune-mediated due to positive B-cell flow (negative CDC cross match) and received plasmapharesis/IVIg x2 with improvement in LV function. His course has been complicated by bilateral IJ/subclavian thrombi. He currently has acute renal failure of unclear etiology. However, he does not have clear evidence of allograft dysfunction or low output on exam. 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     # Immunosuppression:  Tacrolimus -  Will continue dosing at 9 mg BID (increased yesterday). Tacro level 7 this morning from 9.4 Target trough level of 12-14. - - cardizem  240 qd  CellCept - continue 1000 mg PO BID.   Steroids - will give short pulse of oral steroids. Will give 50 mg PO BID for three days with subsequent taper.  Taper starting 3/24 45 mg x2 the 40 x2  decrease by 10  until 15 mg bid- ordered    # Antimicrobial prophylaxis:  PCP - continue Mepron 1500mg PO daily      # Bilateral IJ/Subclavian thrombi - unchanged on f/u U/S 3/12  -   - CHERIE negative    #Acute renal failure, possibly related to vancomycin dosing, improving.   - Vanco stopped.   - No further diuresis.     # CAV PPx  - Continue ECASA 81mg PO daily  - Continue pravastatin 20mg PO QHS    # ID   - Pseudomonas in sputum - course of cefepime completed 3/15  - DLES improved and vancomycin stopped given high trough with NORMAN. Per ID, will redose with daptomycin depending upon repeat vanc level this morning.   - silver sulfadizaine dressings per plastics for R 3rd digit.   - HCV viral load and PCR per protocol. Therapy to be initiated as outpatient by Dr. Thomas (Hepatology)  - Transplant ID following      3/26 Pt for repeat UE thrombus as per HF, r/o propagation  Creat is improving may need medications adjustment  Ambulate  3/27 Biopsy now scheduled for Wednesday. April 4    UE duplex with new innominate vein partial thrombus, cont anticoagulation, now therapeutic. D/w Dr Tavarez     3/28 prograf increased to 9mg bid.  Renal function continues to improve  Will resume hep sq tonight after biopsy this am  3/29 - Prograf 0.5mg po x 1 stat & increase Prograf to 9.5mg po bid  increase CardizemCD to 300mg po daily - received CardizemCD 240 this am therefore Cardizem 60mg po x 1 given this am  CXR w/ RLL haziness - will ultrasound  R LL today  36/30 Hyperkalemia - kayexalate 30 x1  Na bicarb added  Creat improved, now on lovenox  3/31   feeling well today,  VSS, TAC level 10.0, education by transplant coordinator with family, potassium stable  5.0,   cardizem to 360 Qd  4/1    VSS   5 beats wct in NSR,    LAVD site with VAC,   WBC   16.   afebrile,   TAC level   10.1  4/2 Planning on d/c home Tuesday, vac reordered.  Tacro lvl 12 on 10 bid .  Plastics f/u ,  cont silvadene , f/u outpatient.;

## 2018-04-02 NOTE — PROGRESS NOTE ADULT - ASSESSMENT
Assessment:  1.  Bilateral, extensive upper extremity DVT  - Currently on lovenox  - Swelling of RUE controlled with elevation and ace wrap  2.  Heart Transplant  3.  Acute renal failure  4.  Right 3rf digit ischemia - stable  5.  Right foot bunion      Plan  1   Continue with weight based lovenox 1mg/kg BID as long as creatine clearance is above 30  2.  Arm elevation and wrapping  3.  We favor lovenox over DOAC in post op setting - he is in agreement to inject himself at home upon d/c  4.  Hold lovenox 12 hours before and after biopsy      Thanks      Daysi [id number]

## 2018-04-02 NOTE — PROGRESS NOTE ADULT - PROBLEM SELECTOR PLAN 2
Continue prednisone at 15 mg PO BID  Continue CellCept 1000 mg BID  Continue tacrolimus 10 mg PO BID  We will continue on aspirin and Pravachol.

## 2018-04-02 NOTE — PROGRESS NOTE ADULT - PROBLEM SELECTOR PLAN 2
His left kidney with recent sono done shows a small cyst that is showing internal septations. This was present in the past as well. Given on immunosuppresion, would need to monitor this every few months. Consider getting Urology eval as outpt on discharge with cyst/mass specialist Dr Dnae Kiran.

## 2018-04-02 NOTE — PROGRESS NOTE ADULT - ASSESSMENT
67 year old man with ACC/AHA stage D chronic systolic heart failure due to NICM (LVEF 20%) s/p HM2 LVAD 6/17 c/b pump thrombus now s/p OHT 2/23 (CMV D-/R+ and Toxo D-/R+), with post-op course c/b primary graft dysfunction, initially thought to be immune-mediated due to positive B-cell flow (negative CDC cross match) and received plasmapheresis/IVIG x2 with improvement in LV function. Found to have b/l IJ/subclavian thrombi as well. CrCl somewhat decreased from baseline pre-tx.    1) Antimicrobial prophylaxis:  Intermediate risk CMV - Valcyte 450mg daily  Intermediate risk Toxo/PCP - continue Mepron 1500mg PO daily with food.  Thrush - continue Nystatin S/S 5 mL Qid  HCV+ donor/HCV- recipient; HCV (1a) - 3/27 HCV PCR 32,891 (To be treated outpatient, monitoring weekly)  will need a repeat specimen either later this week or at his first clinic visit    2) Leukocytosis  - Patient has no signs infection at bedside; no fevers, no chills. No focal complaints, feels well. Note history thrombus, prior episode rejection.  - Monitor for now  - BCX NGTD  - CXR stable      Gary Lujan MD  [phone]  After 5pm/weekends [phone]

## 2018-04-02 NOTE — PROGRESS NOTE ADULT - SUBJECTIVE AND OBJECTIVE BOX
INFECTIOUS DISEASES FOLLOW UP-- Sujey Lujan  [phone]    This is a follow up note for this  67yMale with s/p OHTx, slowly improving with plans for discharge home in the next few days      ROS:  CONSTITUTIONAL:  No fever, good appetite  CARDIOVASCULAR:  No chest pain or palpitations  RESPIRATORY:  No dyspnea  GASTROINTESTINAL:  No nausea, vomiting, diarrhea, or abdominal pain  GENITOURINARY:  No dysuria  NEUROLOGIC:  No headache,     Allergies    No Known Allergies    Intolerances        ANTIBIOTICS/RELEVANT:  antimicrobials  atovaquone Suspension 1500 milliGRAM(s) Oral daily  nystatin    Suspension 098306 Unit(s) Oral every 6 hours  valGANciclovir 450 milliGRAM(s) Oral daily    immunologic:  mycophenolate mofetil 1000 milliGRAM(s) Oral <User Schedule>  tacrolimus 10 milliGRAM(s) Oral <User Schedule>    OTHER:  acetaminophen   Tablet. 650 milliGRAM(s) Oral every 6 hours PRN  ALBUTerol/ipratropium for Nebulization 3 milliLiter(s) Nebulizer every 6 hours  aspirin enteric coated 81 milliGRAM(s) Oral daily  Calcium Citrate + Vit D, 315 mg/200 Unit 2 Tablet(s) 2 Tablet(s) Oral two times a day  diltiazem    milliGRAM(s) Oral daily  docusate sodium 100 milliGRAM(s) Oral three times a day  enoxaparin Injectable 80 milliGRAM(s) SubCutaneous two times a day  famotidine    Tablet 20 milliGRAM(s) Oral daily  insulin lispro (HumaLOG) corrective regimen sliding scale   SubCutaneous three times a day before meals  insulin lispro (HumaLOG) corrective regimen sliding scale   SubCutaneous at bedtime  magnesium oxide 800 milliGRAM(s) Oral every 12 hours  multivitamin 1 Tablet(s) Oral daily  pravastatin 20 milliGRAM(s) Oral at bedtime  silver sulfADIAZINE 1% Cream 1 Application(s) Topical two times a day  sodium bicarbonate 650 milliGRAM(s) Oral three times a day      Objective:  Vital Signs Last 24 Hrs  T(C): 36.9 (02 Apr 2018 15:11), Max: 37.2 (02 Apr 2018 06:12)  T(F): 98.4 (02 Apr 2018 15:11), Max: 99 (02 Apr 2018 06:12)  HR: 107 (02 Apr 2018 15:11) (104 - 107)  BP: 98/69 (02 Apr 2018 15:11) (98/69 - 127/88)  BP(mean): 96 (02 Apr 2018 06:12) (92 - 103)  RR: 18 (02 Apr 2018 15:11) (17 - 18)  SpO2: 99% (02 Apr 2018 15:11) (97% - 99%)    PHYSICAL EXAM:  Constitutional:no acute distress  Eyes:WALT, EOMI  Ear/Nose/Throat: no oral lesions, 	  Respiratory: clear BL  sternotomy wound healed  right side old LVAd site with wound vac in place  Cardiovascular: S1S2  Gastrointestinal:soft, (+) BS, no tenderness  Extremities:no e/e/c right middle finger bandage dry intact  No Lymphadenopathy  IV sites not inflammed.    LABS:                        9.1    16.2  )-----------( 299      ( 02 Apr 2018 07:28 )             29.2     04-02    141  |  106  |  23  ----------------------------<  91  4.8   |  23  |  1.46<H>    Ca    8.7      02 Apr 2018 07:28  Mg     1.6     04-02    TPro  5.9<L>  /  Alb  3.0<L>  /  TBili  0.8  /  DBili  x   /  AST  21  /  ALT  35  /  AlkPhos  75  04-02    PT/INR - ( 02 Apr 2018 07:28 )   PT: 14.0 sec;   INR: 1.29 ratio         PTT - ( 02 Apr 2018 07:28 )  PTT:39.9 sec      MICROBIOLOGY:            RECENT CULTURES:  03-30 @ 02:30  .Urine Clean Catch (Midstream)  --  --  --    No growth  --  03-29 @ 15:14  .Blood Blood-Peripheral  --  --  --    No growth to date.  --      RADIOLOGY & ADDITIONAL STUDIES:    < from: Xray Chest 1 View- PORTABLE-Routine (04.02.18 @ 05:46) >  IMPRESSION:  No change in right pleural effusion.    < end of copied text >

## 2018-04-02 NOTE — PROGRESS NOTE ADULT - PROBLEM SELECTOR PLAN 3
Continue atovaquone for PJP and toxoplasmosis prophylaxis  Continue valganciclovir for CMV prophylaxis (intermediate risk).   Continue nystatin for thrush prophylaxis

## 2018-04-02 NOTE — PROGRESS NOTE ADULT - SUBJECTIVE AND OBJECTIVE BOX
Im ok    VITAL SIGNS    Telemetry:  nsr 100    Vital Signs Last 24 Hrs  T(C): 37.2 (18 @ 06:12), Max: 37.2 (18 @ 06:12)  T(F): 99 (18 @ 06:12), Max: 99 (18 @ 06:12)  HR: 107 (18 @ 06:12) (104 - 107)  BP: 113/73 (18 @ 07:55) (111/77 - 127/88)  RR: 17 (18 @ 06:12) (17 - 18)  SpO2: 99% (18 @ 06:12) (96% - 99%)                    @ 07:01  -   @ 07:00  --------------------------------------------------------  IN: 360 mL / OUT: 500 mL / NET: -140 mL     @ 07:01  -   @ 11:35  --------------------------------------------------------  IN: 360 mL / OUT: 300 mL / NET: 60 mL          Daily     Daily Weight in k.5 (2018 05:45)        CAPILLARY BLOOD GLUCOSE      POCT Blood Glucose.: 84 mg/dL (2018 07:35)  POCT Blood Glucose.: 90 mg/dL (2018 23:13)  POCT Blood Glucose.: 83 mg/dL (2018 19:28)  POCT Blood Glucose.: 95 mg/dL (2018 11:55)                  Coumadin    [ ] YES          [  ]      NO         Reason:                               PHYSICAL EXAM        Neurology: alert and oriented x 3, nonfocal, no gross deficits  CV : S1 S2 , RRR   Sternal Wound :  CDI , Stable  Lungs: RLL crackles  Abdomen: soft, nontender, nondistended, positive bowel sounds, last bowel movement  +  :           voiding  Extremities:     - edema - calve tenderness          acetaminophen   Tablet. 650 milliGRAM(s) Oral every 6 hours PRN  ALBUTerol/ipratropium for Nebulization 3 milliLiter(s) Nebulizer every 6 hours  aspirin enteric coated 81 milliGRAM(s) Oral daily  atovaquone Suspension 1500 milliGRAM(s) Oral daily  Calcium Citrate + Vit D, 315 mg/200 Unit 2 Tablet(s) 2 Tablet(s) Oral two times a day  diltiazem    milliGRAM(s) Oral daily  docusate sodium 100 milliGRAM(s) Oral three times a day  enoxaparin Injectable 80 milliGRAM(s) SubCutaneous two times a day  famotidine    Tablet 20 milliGRAM(s) Oral daily  insulin lispro (HumaLOG) corrective regimen sliding scale   SubCutaneous three times a day before meals  insulin lispro (HumaLOG) corrective regimen sliding scale   SubCutaneous at bedtime  magnesium oxide 800 milliGRAM(s) Oral every 12 hours  multivitamin 1 Tablet(s) Oral daily  mycophenolate mofetil 1000 milliGRAM(s) Oral <User Schedule>  nystatin    Suspension 668325 Unit(s) Oral every 6 hours  pravastatin 20 milliGRAM(s) Oral at bedtime  silver sulfADIAZINE 1% Cream 1 Application(s) Topical two times a day  sodium bicarbonate 650 milliGRAM(s) Oral three times a day  tacrolimus 10 milliGRAM(s) Oral <User Schedule>  valGANciclovir 450 milliGRAM(s) Oral daily                    Physical Therapy Rec:   Home  [  ]   Home w/ PT  [  ]  Rehab  [  ]  Discussed with Cardiothoracic Team at AM rounds.

## 2018-04-02 NOTE — PROGRESS NOTE ADULT - PROBLEM SELECTOR PLAN 1
post cardiac transplant, in the setting of tacrolimus and bactrim use. Renal function slowly improving. Pt. maintaining good UO. Last tacrolimus level acceptable. NORMAN resolving. Monitor BMP. Strict I/Os. Avoid nephrotoxins. Renally dose all medications.

## 2018-04-02 NOTE — PROGRESS NOTE ADULT - SUBJECTIVE AND OBJECTIVE BOX
Subjective:    Medications:  acetaminophen   Tablet. 650 milliGRAM(s) Oral every 6 hours PRN  ALBUTerol/ipratropium for Nebulization 3 milliLiter(s) Nebulizer every 6 hours  aspirin enteric coated 81 milliGRAM(s) Oral daily  atovaquone Suspension 1500 milliGRAM(s) Oral daily  Calcium Citrate + Vit D, 315 mg/200 Unit 2 Tablet(s) 2 Tablet(s) Oral two times a day  diltiazem    milliGRAM(s) Oral daily  docusate sodium 100 milliGRAM(s) Oral three times a day  enoxaparin Injectable 80 milliGRAM(s) SubCutaneous two times a day  famotidine    Tablet 20 milliGRAM(s) Oral daily  insulin lispro (HumaLOG) corrective regimen sliding scale   SubCutaneous three times a day before meals  insulin lispro (HumaLOG) corrective regimen sliding scale   SubCutaneous at bedtime  magnesium oxide 800 milliGRAM(s) Oral every 12 hours  multivitamin 1 Tablet(s) Oral daily  mycophenolate mofetil 1000 milliGRAM(s) Oral <User Schedule>  nystatin    Suspension 886340 Unit(s) Oral every 6 hours  pravastatin 20 milliGRAM(s) Oral at bedtime  silver sulfADIAZINE 1% Cream 1 Application(s) Topical two times a day  sodium bicarbonate 650 milliGRAM(s) Oral three times a day  tacrolimus 10 milliGRAM(s) Oral <User Schedule>  valGANciclovir 450 milliGRAM(s) Oral daily      Physical Exam:    Vital Signs Last 24 Hrs  T(C): 36.9 (2018 19:01), Max: 37.2 (2018 06:12)  T(F): 98.5 (2018 19:01), Max: 99 (2018 06:12)  HR: 110 (2018 19:01) (104 - 110)  BP: 105/73 (2018 19:01) (98/69 - 125/79)  BP(mean): 96 (2018 06:12) (92 - 96)  RR: 18 (2018 19:01) (17 - 18)  SpO2: 99% (2018 19:01) (99% - 99%)    Daily Weight in k.5 (2018 05:45)    I&O's Summary    2018 07:01  -  2018 07:00  --------------------------------------------------------  IN: 360 mL / OUT: 500 mL / NET: -140 mL    2018 07:01  -  2018 20:32  --------------------------------------------------------  IN: 720 mL / OUT: 540 mL / NET: 180 mL    General: No distress. Comfortable.  HEENT: EOM intact.  Neck: Neck supple. JVP not elevated. No masses  Chest: Clear to auscultation bilaterally  CV: Normal S1 and S2. No murmurs, rub, or gallops. Radial pulses normal.  Abdomen: Soft, non-distended, non-tender  Skin: No rashes or skin breakdown  Neurology: Alert and oriented times three. Sensation intact  Psych: Affect normal    Labs:                        9.1    16.2  )-----------( 299      ( 2018 07:28 )             29.2     04-02    141  |  106  |  23  ----------------------------<  91  4.8   |  23  |  1.46<H>    Ca    8.7      2018 07:28  Mg     1.6     04-02    TPro  5.9<L>  /  Alb  3.0<L>  /  TBili  0.8  /  DBili  x   /  AST  21  /  ALT  35  /  AlkPhos  75  04-02    PT/INR - ( 2018 07:28 )   PT: 14.0 sec;   INR: 1.29 ratio      PTT - ( 2018 07:28 )  PTT:39.9 sec

## 2018-04-02 NOTE — PROGRESS NOTE ADULT - SUBJECTIVE AND OBJECTIVE BOX
VASCULAR MEDICINE                         CC:  UEDVT    INTERVAL HISTORY:     No CP or SOB. R arm without symptoms.  Remains on lovenox.  MEDICATIONS  (STANDING):  ALBUTerol/ipratropium for Nebulization 3 milliLiter(s) Nebulizer every 6 hours  aspirin enteric coated 81 milliGRAM(s) Oral daily  atovaquone Suspension 1500 milliGRAM(s) Oral daily  Calcium Citrate + Vit D, 315 mg/200 Unit 2 Tablet(s) 2 Tablet(s) Oral two times a day  diltiazem    milliGRAM(s) Oral daily  docusate sodium 100 milliGRAM(s) Oral three times a day  enoxaparin Injectable 80 milliGRAM(s) SubCutaneous two times a day  famotidine    Tablet 20 milliGRAM(s) Oral daily  insulin lispro (HumaLOG) corrective regimen sliding scale   SubCutaneous three times a day before meals  insulin lispro (HumaLOG) corrective regimen sliding scale   SubCutaneous at bedtime  magnesium oxide 800 milliGRAM(s) Oral every 12 hours  multivitamin 1 Tablet(s) Oral daily  mycophenolate mofetil 1000 milliGRAM(s) Oral <User Schedule>  nystatin    Suspension 048803 Unit(s) Oral every 6 hours  pravastatin 20 milliGRAM(s) Oral at bedtime  silver sulfADIAZINE 1% Cream 1 Application(s) Topical two times a day  sodium bicarbonate 650 milliGRAM(s) Oral three times a day  tacrolimus 10 milliGRAM(s) Oral <User Schedule>  valGANciclovir 450 milliGRAM(s) Oral daily    MEDICATIONS  (PRN):  acetaminophen   Tablet. 650 milliGRAM(s) Oral every 6 hours PRN Mild Pain (1 - 3)    REVIEW OF SYSTEMS:  CONSTITUTIONAL: No fever, weight loss, or fatigue  EYES: No eye pain, visual disturbances, or discharge  ENMT:  No difficulty hearing, tinnitus, vertigo; No sinus or throat pain  NECK: No pain or stiffness  RESPIRATORY: No cough, wheezing, chills or hemoptysis; No Shortness of Breath  CARDIOVASCULAR: No chest pain, palpitations, passing out, dizziness, or leg swelling  GASTROINTESTINAL: No abdominal or epigastric pain. No nausea, vomiting, or hematemesis; No diarrhea or constipation. No melena or hematochezia.  GENITOURINARY: No dysuria, frequency, hematuria, or incontinence  NEUROLOGICAL: No headaches, memory loss, loss of strength, numbness, or tremors  SKIN: No itching, burning, rashes, or lesions   LYMPH Nodes: No enlarged glands  ENDOCRINE: No heat or cold intolerance; No hair loss  MUSCULOSKELETAL: No joint pain or swelling; No muscle, back, or extremity pain  PSYCHIATRIC: No depression, anxiety, mood swings, or difficulty sleeping  HEME/LYMPH: No easy bruising, or bleeding gums  ALLERY AND IMMUNOLOGIC: No hives or eczema	    [x ] All others negative	  [ ] Unable to obtain      ICU Vital Signs Last 24 Hrs  T(C): 36.9 (02 Apr 2018 15:11), Max: 37.2 (02 Apr 2018 06:12)  T(F): 98.4 (02 Apr 2018 15:11), Max: 99 (02 Apr 2018 06:12)  HR: 107 (02 Apr 2018 15:11) (104 - 107)  BP: 98/69 (02 Apr 2018 15:11) (98/69 - 127/88)  BP(mean): 96 (02 Apr 2018 06:12) (92 - 103)  ABP: --  ABP(mean): --  RR: 18 (02 Apr 2018 15:11) (17 - 18)  SpO2: 99% (02 Apr 2018 15:11) (97% - 99%)      Appearance: Normal	  HEENT:   Normal oral mucosa, PERRL, EOMI	  Lymphatic: No lymphadenopathy  Cardiovascular: Normal S1 S2   Respiratory: Lungs clear to auscultation	  Psychiatry: A & O x 3, Mood & affect appropriate  Gastrointestinal:  Soft, Non-tender, + BS	  Skin: No rashes, No ecchymoses, No cyanosis	  Neurologic: Non-focal  Extremities:  Right upper extremity bicep area fullness and edema which is improving.  Forearm without edema.  Strong Right radial pulse.  The 3rd digit is has distal eschar.  The dorsal aspect is larger and has a small break in the skin.                                  9.1    16.2  )-----------( 299      ( 02 Apr 2018 07:28 )             29.2   04-02    141  |  106  |  23  ----------------------------<  91  4.8   |  23  |  1.46<H>    Ca    8.7      02 Apr 2018 07:28  Mg     1.6     04-02    TPro  5.9<L>  /  Alb  3.0<L>  /  TBili  0.8  /  DBili  x   /  AST  21  /  ALT  35  /  AlkPhos  75  04-02

## 2018-04-02 NOTE — PROGRESS NOTE ADULT - ASSESSMENT
Mr. Baez is a 67 year old man with ACC/AHA stage D chronic systolic heart failure due to NICM s/p HM2 LVAD 6/17 c/b pump thrombosis now s/p heart transplant on 2/23 (CMV D-/R+ and Toxo D-/R+), with post-op course c/b primary graft dysfunction, initially thought to be immune-mediated due to positive B-cell flow (negative CDC cross match) and received plasmapharesis/IVIg x2 with improvement in LV function. His course has been complicated by bilateral IJ/subclavian thrombi. He does not have clear evidence of allograft dysfunction or low output on exam.     Plan discussed with 2 Gonz[facility] NP team and in multidisciplinary rounds.

## 2018-04-03 DIAGNOSIS — T86.290 CARDIAC ALLOGRAFT VASCULOPATHY: ICD-10-CM

## 2018-04-03 LAB
ALBUMIN SERPL ELPH-MCNC: 3 G/DL — LOW (ref 3.3–5)
ALP SERPL-CCNC: 73 U/L — SIGNIFICANT CHANGE UP (ref 40–120)
ALT FLD-CCNC: 31 U/L RC — SIGNIFICANT CHANGE UP (ref 10–45)
ANION GAP SERPL CALC-SCNC: 13 MMOL/L — SIGNIFICANT CHANGE UP (ref 5–17)
APTT BLD: 41 SEC — HIGH (ref 27.5–37.4)
AST SERPL-CCNC: 18 U/L — SIGNIFICANT CHANGE UP (ref 10–40)
BILIRUB SERPL-MCNC: 1 MG/DL — SIGNIFICANT CHANGE UP (ref 0.2–1.2)
BUN SERPL-MCNC: 23 MG/DL — SIGNIFICANT CHANGE UP (ref 7–23)
CALCIUM SERPL-MCNC: 8.8 MG/DL — SIGNIFICANT CHANGE UP (ref 8.4–10.5)
CHLORIDE SERPL-SCNC: 102 MMOL/L — SIGNIFICANT CHANGE UP (ref 96–108)
CO2 SERPL-SCNC: 23 MMOL/L — SIGNIFICANT CHANGE UP (ref 22–31)
CREAT SERPL-MCNC: 1.57 MG/DL — HIGH (ref 0.5–1.3)
CULTURE RESULTS: SIGNIFICANT CHANGE UP
CULTURE RESULTS: SIGNIFICANT CHANGE UP
GLUCOSE BLDC GLUCOMTR-MCNC: 115 MG/DL — HIGH (ref 70–99)
GLUCOSE BLDC GLUCOMTR-MCNC: 157 MG/DL — HIGH (ref 70–99)
GLUCOSE BLDC GLUCOMTR-MCNC: 85 MG/DL — SIGNIFICANT CHANGE UP (ref 70–99)
GLUCOSE BLDC GLUCOMTR-MCNC: 90 MG/DL — SIGNIFICANT CHANGE UP (ref 70–99)
GLUCOSE SERPL-MCNC: 92 MG/DL — SIGNIFICANT CHANGE UP (ref 70–99)
HCT VFR BLD CALC: 29.3 % — LOW (ref 39–50)
HGB BLD-MCNC: 9.1 G/DL — LOW (ref 13–17)
INR BLD: 1.23 RATIO — HIGH (ref 0.88–1.16)
MAGNESIUM SERPL-MCNC: 1.8 MG/DL — SIGNIFICANT CHANGE UP (ref 1.6–2.6)
MCHC RBC-ENTMCNC: 29.3 PG — SIGNIFICANT CHANGE UP (ref 27–34)
MCHC RBC-ENTMCNC: 31.2 GM/DL — LOW (ref 32–36)
MCV RBC AUTO: 93.9 FL — SIGNIFICANT CHANGE UP (ref 80–100)
PLATELET # BLD AUTO: 248 K/UL — SIGNIFICANT CHANGE UP (ref 150–400)
POTASSIUM SERPL-MCNC: 5 MMOL/L — SIGNIFICANT CHANGE UP (ref 3.5–5.3)
POTASSIUM SERPL-SCNC: 5 MMOL/L — SIGNIFICANT CHANGE UP (ref 3.5–5.3)
PROT SERPL-MCNC: 6.1 G/DL — SIGNIFICANT CHANGE UP (ref 6–8.3)
PROTHROM AB SERPL-ACNC: 13.5 SEC — HIGH (ref 9.8–12.7)
RBC # BLD: 3.11 M/UL — LOW (ref 4.2–5.8)
RBC # FLD: 19.3 % — HIGH (ref 10.3–14.5)
SODIUM SERPL-SCNC: 138 MMOL/L — SIGNIFICANT CHANGE UP (ref 135–145)
SPECIMEN SOURCE: SIGNIFICANT CHANGE UP
SPECIMEN SOURCE: SIGNIFICANT CHANGE UP
TACROLIMUS SERPL-MCNC: 11.4 NG/ML — SIGNIFICANT CHANGE UP
WBC # BLD: 14.3 K/UL — HIGH (ref 3.8–10.5)
WBC # FLD AUTO: 14.3 K/UL — HIGH (ref 3.8–10.5)

## 2018-04-03 PROCEDURE — 71045 X-RAY EXAM CHEST 1 VIEW: CPT | Mod: 26

## 2018-04-03 PROCEDURE — 99233 SBSQ HOSP IP/OBS HIGH 50: CPT

## 2018-04-03 PROCEDURE — 99233 SBSQ HOSP IP/OBS HIGH 50: CPT | Mod: GC

## 2018-04-03 PROCEDURE — 99232 SBSQ HOSP IP/OBS MODERATE 35: CPT

## 2018-04-03 PROCEDURE — 0399T: CPT

## 2018-04-03 PROCEDURE — 93306 TTE W/DOPPLER COMPLETE: CPT | Mod: 26

## 2018-04-03 PROCEDURE — 90832 PSYTX W PT 30 MINUTES: CPT

## 2018-04-03 RX ADMIN — Medication 500000 UNIT(S): at 06:03

## 2018-04-03 RX ADMIN — TACROLIMUS 10 MILLIGRAM(S): 5 CAPSULE ORAL at 20:01

## 2018-04-03 RX ADMIN — Medication 3 MILLILITER(S): at 17:57

## 2018-04-03 RX ADMIN — Medication 81 MILLIGRAM(S): at 11:47

## 2018-04-03 RX ADMIN — Medication 650 MILLIGRAM(S): at 10:20

## 2018-04-03 RX ADMIN — VALGANCICLOVIR 450 MILLIGRAM(S): 450 TABLET, FILM COATED ORAL at 11:47

## 2018-04-03 RX ADMIN — Medication 650 MILLIGRAM(S): at 13:05

## 2018-04-03 RX ADMIN — MAGNESIUM OXIDE 400 MG ORAL TABLET 800 MILLIGRAM(S): 241.3 TABLET ORAL at 06:03

## 2018-04-03 RX ADMIN — MAGNESIUM OXIDE 400 MG ORAL TABLET 800 MILLIGRAM(S): 241.3 TABLET ORAL at 17:55

## 2018-04-03 RX ADMIN — Medication 20 MILLIGRAM(S): at 22:40

## 2018-04-03 RX ADMIN — Medication 650 MILLIGRAM(S): at 22:39

## 2018-04-03 RX ADMIN — Medication 500000 UNIT(S): at 11:46

## 2018-04-03 RX ADMIN — TACROLIMUS 10 MILLIGRAM(S): 5 CAPSULE ORAL at 08:02

## 2018-04-03 RX ADMIN — Medication 360 MILLIGRAM(S): at 06:03

## 2018-04-03 RX ADMIN — Medication 1 APPLICATION(S): at 06:05

## 2018-04-03 RX ADMIN — Medication 3 MILLILITER(S): at 06:04

## 2018-04-03 RX ADMIN — FAMOTIDINE 20 MILLIGRAM(S): 10 INJECTION INTRAVENOUS at 11:47

## 2018-04-03 RX ADMIN — Medication 650 MILLIGRAM(S): at 06:03

## 2018-04-03 RX ADMIN — Medication 500000 UNIT(S): at 01:58

## 2018-04-03 RX ADMIN — Medication 1 APPLICATION(S): at 17:55

## 2018-04-03 RX ADMIN — ENOXAPARIN SODIUM 80 MILLIGRAM(S): 100 INJECTION SUBCUTANEOUS at 06:04

## 2018-04-03 RX ADMIN — Medication 500000 UNIT(S): at 17:56

## 2018-04-03 RX ADMIN — Medication 25 MILLIGRAM(S): at 15:52

## 2018-04-03 RX ADMIN — MYCOPHENOLATE MOFETIL 1000 MILLIGRAM(S): 250 CAPSULE ORAL at 20:01

## 2018-04-03 RX ADMIN — ATOVAQUONE 1500 MILLIGRAM(S): 750 SUSPENSION ORAL at 11:45

## 2018-04-03 RX ADMIN — Medication 650 MILLIGRAM(S): at 10:50

## 2018-04-03 RX ADMIN — MYCOPHENOLATE MOFETIL 1000 MILLIGRAM(S): 250 CAPSULE ORAL at 08:03

## 2018-04-03 RX ADMIN — Medication 1 TABLET(S): at 11:47

## 2018-04-03 RX ADMIN — Medication 3 MILLILITER(S): at 11:46

## 2018-04-03 RX ADMIN — Medication 2: at 11:45

## 2018-04-03 NOTE — PROGRESS NOTE ADULT - SUBJECTIVE AND OBJECTIVE BOX
hello , im ready    VITAL SIGNS    Telemetry:  nsr/ st 100    Vital Signs Last 24 Hrs  T(C): 36.7 (04-03-18 @ 10:59), Max: 36.9 (04-02-18 @ 15:11)  T(F): 98 (04-03-18 @ 10:59), Max: 98.5 (04-02-18 @ 19:01)  HR: 110 (04-03-18 @ 10:59) (102 - 110)  BP: 109/78 (04-03-18 @ 10:59) (98/69 - 114/84)  RR: 18 (04-03-18 @ 10:59) (18 - 18)  SpO2: 99% (04-03-18 @ 10:59) (99% - 99%)                   04-02 @ 07:01  -  04-03 @ 07:00  --------------------------------------------------------  IN: 720 mL / OUT: 1090 mL / NET: -370 mL    04-03 @ 07:01  -  04-03 @ 11:09  --------------------------------------------------------  IN: 360 mL / OUT: 0 mL / NET: 360 mL          Daily     Daily         CAPILLARY BLOOD GLUCOSE      POCT Blood Glucose.: 90 mg/dL (03 Apr 2018 07:17)  POCT Blood Glucose.: 84 mg/dL (02 Apr 2018 22:10)  POCT Blood Glucose.: 98 mg/dL (02 Apr 2018 19:06)  POCT Blood Glucose.: 96 mg/dL (02 Apr 2018 11:38)                Coumadin    [ ] YES          [  ]      NO                                   PHYSICAL EXAM        Neurology: alert and oriented x 3, nonfocal, no gross deficits  CV : .S1S2 RRR  Sternal Wound :  CDI , Stable  Lungs: bibasilar crackles   Abdomen: soft, nontender, nondistended, positive bowel sounds, last bowel movement 4/2  :         voiding    Extremities:     - edema - calve tenderness          acetaminophen   Tablet. 650 milliGRAM(s) Oral every 6 hours PRN  ALBUTerol/ipratropium for Nebulization 3 milliLiter(s) Nebulizer every 6 hours  aspirin enteric coated 81 milliGRAM(s) Oral daily  atovaquone Suspension 1500 milliGRAM(s) Oral daily  Calcium Citrate + Vit D, 315 mg/200 Unit 2 Tablet(s) 2 Tablet(s) Oral two times a day  diltiazem    milliGRAM(s) Oral daily  docusate sodium 100 milliGRAM(s) Oral three times a day  enoxaparin Injectable 80 milliGRAM(s) SubCutaneous two times a day  famotidine    Tablet 20 milliGRAM(s) Oral daily  insulin lispro (HumaLOG) corrective regimen sliding scale   SubCutaneous three times a day before meals  insulin lispro (HumaLOG) corrective regimen sliding scale   SubCutaneous at bedtime  magnesium oxide 800 milliGRAM(s) Oral every 12 hours  multivitamin 1 Tablet(s) Oral daily  mycophenolate mofetil 1000 milliGRAM(s) Oral <User Schedule>  nystatin    Suspension 602744 Unit(s) Oral every 6 hours  pravastatin 20 milliGRAM(s) Oral at bedtime  silver sulfADIAZINE 1% Cream 1 Application(s) Topical two times a day  sodium bicarbonate 650 milliGRAM(s) Oral three times a day  tacrolimus 10 milliGRAM(s) Oral <User Schedule>  valGANciclovir 450 milliGRAM(s) Oral daily                    Physical Therapy Rec:   Home  [  ]   Home w/ PT  [  ]  Rehab  [  ]  Discussed with Cardiothoracic Team at AM rounds.

## 2018-04-03 NOTE — PROGRESS NOTE ADULT - SUBJECTIVE AND OBJECTIVE BOX
Learner: Tahira (a friend)  Barriers: None    Patient received a cardiac transplant.    Method used: Verbal discussion, written materials, pre-filled pill boxes, and MedAction Plan with medications information and pills colors were provided     Medication safety was discussed with Tahira: allergies, herbals, adherence, interactions, medication precautions, miss dose instructions, purpose, side effects, signs and symptoms to report, and storage & handling    Taihra was able to repeat and verbalize key points. She understands the importance of taking medications and will reach out to the team if there are any questions or concerns at any time.      Education Summary: Discharge immunosuppressant medications and prophylatic anti-infective agents reviewed with Tahira. Outpatient medication schedule was discussed in detail including: medication name, indication, dose, administration times, treatment duration, side effects, drug interactions, and special instructions. Tahira's questions and concerns were answered and addressed. Tahira demonstrated understanding.    Time spent discharge education: 60 minutes    MEDICATIONS  (STANDING):  ALBUTerol/ipratropium for Nebulization 3 milliLiter(s) Nebulizer every 6 hours  aspirin enteric coated 81 milliGRAM(s) Oral daily  atovaquone Suspension 1500 milliGRAM(s) Oral daily  Calcium Citrate + Vit D, 315 mg/200 Unit 2 Tablet(s) 2 Tablet(s) Oral two times a day  diltiazem    milliGRAM(s) Oral daily  docusate sodium 100 milliGRAM(s) Oral three times a day  famotidine    Tablet 20 milliGRAM(s) Oral daily  insulin lispro (HumaLOG) corrective regimen sliding scale   SubCutaneous three times a day before meals  insulin lispro (HumaLOG) corrective regimen sliding scale   SubCutaneous at bedtime  magnesium oxide 800 milliGRAM(s) Oral every 12 hours  multivitamin 1 Tablet(s) Oral daily  mycophenolate mofetil 1000 milliGRAM(s) Oral <User Schedule>  nystatin    Suspension 224691 Unit(s) Oral every 6 hours  pravastatin 20 milliGRAM(s) Oral at bedtime  silver sulfADIAZINE 1% Cream 1 Application(s) Topical two times a day  sodium bicarbonate 650 milliGRAM(s) Oral three times a day  tacrolimus 10 milliGRAM(s) Oral <User Schedule>  valGANciclovir 450 milliGRAM(s) Oral daily    MEDICATIONS  (PRN):  acetaminophen   Tablet. 650 milliGRAM(s) Oral every 6 hours PRN Mild Pain (1 - 3)      Cardiac Transplant Medications:  Tacrolimus adjusted to trough (current dose is 10 mg PO twice a day)  Mycophenolate mofetil 1,000 mg PO twice a day  Prednisone taper (15 mg PO twice a day)  Atovaquone 1,500 mg PO once a day  Nystatin 500,000 units (5 mL/teaspoon) swish, hold in mouth for 1 minute and swallow four times a day  Valganciclovir 450 mg 1 tablet PO daily   Docusate 300 mg PO at bedtime                           9.1    14.3  )-----------( 248      ( 03 Apr 2018 07:30 )             29.3     04-03    138  |  102  |  23  ----------------------------<  92  5.0   |  23  |  1.57<H>    Ca    8.8      03 Apr 2018 07:30  Mg     1.8     04-03    TPro  6.1  /  Alb  3.0<L>  /  TBili  1.0  /  DBili  x   /  AST  18  /  ALT  31  /  AlkPhos  73  04-03    LIVER FUNCTIONS - ( 03 Apr 2018 07:30 )  Alb: 3.0 g/dL / Pro: 6.1 g/dL / ALK PHOS: 73 U/L / ALT: 31 U/L RC / AST: 18 U/L / GGT: x           Tacrolimus (), Serum: 11.4 ng/mL (04-03-18 @ 08:50)  Tacrolimus (), Serum: 12.1 ng/mL (04-02-18 @ 09:27)  Tacrolimus (), Serum: 10.1 ng/mL (04-01-18 @ 07:57)

## 2018-04-03 NOTE — PROGRESS NOTE ADULT - SUBJECTIVE AND OBJECTIVE BOX
Subjective: He feels well today without specific complaints. No cough or shortness of breath. He does not require supplemental oxygen.     Medications:  acetaminophen   Tablet. 650 milliGRAM(s) Oral every 6 hours PRN  ALBUTerol/ipratropium for Nebulization 3 milliLiter(s) Nebulizer every 6 hours  aspirin enteric coated 81 milliGRAM(s) Oral daily  atovaquone Suspension 1500 milliGRAM(s) Oral daily  Calcium Citrate + Vit D, 315 mg/200 Unit 2 Tablet(s) 2 Tablet(s) Oral two times a day  diltiazem    milliGRAM(s) Oral daily  docusate sodium 100 milliGRAM(s) Oral three times a day  enoxaparin Injectable 80 milliGRAM(s) SubCutaneous two times a day  famotidine    Tablet 20 milliGRAM(s) Oral daily  insulin lispro (HumaLOG) corrective regimen sliding scale   SubCutaneous three times a day before meals  insulin lispro (HumaLOG) corrective regimen sliding scale   SubCutaneous at bedtime  magnesium oxide 800 milliGRAM(s) Oral every 12 hours  multivitamin 1 Tablet(s) Oral daily  mycophenolate mofetil 1000 milliGRAM(s) Oral <User Schedule>  nystatin    Suspension 828800 Unit(s) Oral every 6 hours  pravastatin 20 milliGRAM(s) Oral at bedtime  silver sulfADIAZINE 1% Cream 1 Application(s) Topical two times a day  sodium bicarbonate 650 milliGRAM(s) Oral three times a day  tacrolimus 10 milliGRAM(s) Oral <User Schedule>  valGANciclovir 450 milliGRAM(s) Oral daily      Physical Exam:    Vital Signs Last 24 Hrs  T(C): 36.7 (03 Apr 2018 07:02), Max: 36.9 (02 Apr 2018 15:11)  T(F): 98.1 (03 Apr 2018 07:02), Max: 98.5 (02 Apr 2018 19:01)  HR: 109 (03 Apr 2018 07:02) (102 - 110)  BP: 114/84 (03 Apr 2018 07:02) (98/69 - 114/84)  RR: 18 (03 Apr 2018 07:02) (18 - 18)  SpO2: 99% (03 Apr 2018 07:02) (99% - 99%)    I&O's Summary    02 Apr 2018 07:01  -  03 Apr 2018 07:00  --------------------------------------------------------  IN: 720 mL / OUT: 1090 mL / NET: -370 mL    03 Apr 2018 07:01  -  03 Apr 2018 09:24  --------------------------------------------------------  IN: 360 mL / OUT: 0 mL / NET: 360 mL    General: No distress. Comfortable.  HEENT: EOM intact.  Neck: Neck supple. JVP not elevated. No masses  Chest: Decreased breath sounds in right lung base.   CV: RRR with normal S1 and S2. III/VI systolic murmur heard best at left lower sternal border. No rubs or gallops. Radial pulses normal.  Abdomen: Soft, non-distended, non-tender  Skin: No rashes or skin breakdown. Wound vac in place with clean healing wound at former drive line exit site. Right upper arm hematoma, unchanged.   Neurology: Alert and oriented times three. Sensation intact  Psych: Affect normal    Labs:                        9.1    14.3  )-----------( 248      ( 03 Apr 2018 07:30 )             29.3     04-03    138  |  102  |  23  ----------------------------<  92  5.0   |  23  |  1.57<H>    Ca    8.8      03 Apr 2018 07:30  Mg     1.8     04-03    TPro  6.1  /  Alb  3.0<L>  /  TBili  1.0  /  DBili  x   /  AST  18  /  ALT  31  /  AlkPhos  73  04-03    PT/INR - ( 03 Apr 2018 07:30 )   PT: 13.5 sec;   INR: 1.23 ratio      PTT - ( 03 Apr 2018 07:30 )  PTT:41.0 sec

## 2018-04-03 NOTE — PROGRESS NOTE ADULT - ASSESSMENT
67 year old man with ACC/AHA stage D chronic systolic heart failure due to NICM s/p HM2 LVAD 6/17 c/b pump thrombosis now s/p heart transplant on 2/23 (CMV D-/R+ and Toxo D-/R+), with post-op course c/b primary graft dysfunction, initially thought to be immune-mediated due to positive B-cell flow (negative CDC cross match) and received plasmapharesis/IVIg x2 with improvement in LV function. His course has been complicated by bilateral IJ/subclavian thrombi. He currently has acute renal failure of unclear etiology. However, he does not have clear evidence of allograft dysfunction or low output on exam. 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     # Immunosuppression:  Tacrolimus -  Will continue dosing at 9 mg BID (increased yesterday). Tacro level 7 this morning from 9.4 Target trough level of 12-14. - - cardizem  240 qd  CellCept - continue 1000 mg PO BID.   Steroids - will give short pulse of oral steroids. Will give 50 mg PO BID for three days with subsequent taper.  Taper starting 3/24 45 mg x2 the 40 x2  decrease by 10  until 15 mg bid- ordered    # Antimicrobial prophylaxis:  PCP - continue Mepron 1500mg PO daily      # Bilateral IJ/Subclavian thrombi - unchanged on f/u U/S 3/12  -   - CHERIE negative    #Acute renal failure, possibly related to vancomycin dosing, improving.   - Vanco stopped.   - No further diuresis.     # CAV PPx  - Continue ECASA 81mg PO daily  - Continue pravastatin 20mg PO QHS    # ID   - Pseudomonas in sputum - course of cefepime completed 3/15  - DLES improved and vancomycin stopped given high trough with NORMAN. Per ID, will redose with daptomycin depending upon repeat vanc level this morning.   - silver sulfadizaine dressings per plastics for R 3rd digit.   - HCV viral load and PCR per protocol. Therapy to be initiated as outpatient by Dr. Thomas (Hepatology)  - Transplant ID following      3/26 Pt for repeat UE thrombus as per HF, r/o propagation  Creat is improving may need medications adjustment  Ambulate  3/27 Biopsy now scheduled for Wednesday. April 4    UE duplex with new innominate vein partial thrombus, cont anticoagulation, now therapeutic. D/w Dr Tavarez     3/28 prograf increased to 9mg bid.  Renal function continues to improve  Will resume hep sq tonight after biopsy this am  3/29 - Prograf 0.5mg po x 1 stat & increase Prograf to 9.5mg po bid  increase CardizemCD to 300mg po daily - received CardizemCD 240 this am therefore Cardizem 60mg po x 1 given this am  CXR w/ RLL haziness - will ultrasound  R LL today  36/30 Hyperkalemia - kayexalate 30 x1  Na bicarb added  Creat improved, now on lovenox  3/31   feeling well today,  VSS, TAC level 10.0, education by transplant coordinator with family, potassium stable  5.0,   cardizem to 360 Qd  4/1    VSS   5 beats wct in NSR,    LAVD site with VAC,   WBC   16.   afebrile,   TAC level   10.1  4/2 Planning on d/c home Tuesday, vac reordered.  Tacro lvl 12 on 10 bid .  Plastics f/u ,  cont silvadene , f/u outpatient.;  4/3 Pt for d/c home today

## 2018-04-03 NOTE — PROGRESS NOTE ADULT - PROBLEM SELECTOR PLAN 1
post cardiac transplant, in the setting of tacrolimus, vancomycin and bactrim use. Renal function improved and now stable. Pt. maintaining good UO. Last tacrolimus level acceptable. Monitor BMP. Strict I/Os. Avoid nephrotoxins. Renally dose all medications. Pt will need outpt follow up with Nephrology, with Dr. Sarkis Hutchins, [phone] Pt. with hemodynamically mediated NORMAN post cardiac transplant, in the setting of tacrolimus, vancomycin and bactrim use. Renal function improved and now stable. Pt. maintaining good UO. Last tacrolimus level acceptable. Monitor BMP. Strict I/Os. Avoid nephrotoxins. Renally dose all medications.

## 2018-04-03 NOTE — PROGRESS NOTE ADULT - SUBJECTIVE AND OBJECTIVE BOX
[facility] DIVISION OF KIDNEY DISEASES AND HYPERTENSION -- FOLLOW UP NOTE  --------------------------------------------------------------------------------  Chief Complaint:  NORMAN    24 hour events/subjective:  Pt feeling well. Pending d/c today.      PAST HISTORY  --------------------------------------------------------------------------------  No significant changes to PMH, PSH, FHx, SHx, unless otherwise noted    ALLERGIES & MEDICATIONS  --------------------------------------------------------------------------------  Allergies    No Known Allergies    Intolerances      Standing Inpatient Medications  ALBUTerol/ipratropium for Nebulization 3 milliLiter(s) Nebulizer every 6 hours  aspirin enteric coated 81 milliGRAM(s) Oral daily  atovaquone Suspension 1500 milliGRAM(s) Oral daily  Calcium Citrate + Vit D, 315 mg/200 Unit 2 Tablet(s) 2 Tablet(s) Oral two times a day  diltiazem    milliGRAM(s) Oral daily  docusate sodium 100 milliGRAM(s) Oral three times a day  enoxaparin Injectable 80 milliGRAM(s) SubCutaneous two times a day  famotidine    Tablet 20 milliGRAM(s) Oral daily  insulin lispro (HumaLOG) corrective regimen sliding scale   SubCutaneous three times a day before meals  insulin lispro (HumaLOG) corrective regimen sliding scale   SubCutaneous at bedtime  magnesium oxide 800 milliGRAM(s) Oral every 12 hours  multivitamin 1 Tablet(s) Oral daily  mycophenolate mofetil 1000 milliGRAM(s) Oral <User Schedule>  nystatin    Suspension 511771 Unit(s) Oral every 6 hours  pravastatin 20 milliGRAM(s) Oral at bedtime  silver sulfADIAZINE 1% Cream 1 Application(s) Topical two times a day  sodium bicarbonate 650 milliGRAM(s) Oral three times a day  tacrolimus 10 milliGRAM(s) Oral <User Schedule>  valGANciclovir 450 milliGRAM(s) Oral daily    PRN Inpatient Medications  acetaminophen   Tablet. 650 milliGRAM(s) Oral every 6 hours PRN      REVIEW OF SYSTEMS  --------------------------------------------------------------------------------  Gen: no fatigue, fevers/chills  Respiratory: No dyspnea  CV: No chest pain  GI: No abdominal pain  MSK: No edema  Neuro: no confusion    VITALS/PHYSICAL EXAM  --------------------------------------------------------------------------------  T(C): 36.7 (04-03-18 @ 10:59), Max: 36.9 (04-02-18 @ 19:01)  HR: 110 (04-03-18 @ 10:59) (102 - 110)  BP: 109/78 (04-03-18 @ 10:59) (105/73 - 114/84)  RR: 18 (04-03-18 @ 10:59) (18 - 18)  SpO2: 99% (04-03-18 @ 10:59) (99% - 99%)  Wt(kg): --        04-02-18 @ 07:01  -  04-03-18 @ 07:00  --------------------------------------------------------  IN: 720 mL / OUT: 1090 mL / NET: -370 mL    04-03-18 @ 07:01  -  04-03-18 @ 16:04  --------------------------------------------------------  IN: 837 mL / OUT: 550 mL / NET: 287 mL      Physical Exam:  	Gen: NAD, well-appearing  	Pulm: CTA B/L  	CV: RRR, S1S2; no rub  	Abd: +BS, soft, nontender/nondistended  	UE: Warm, no edema  	LE: Warm, no edema  	Neuro: No focal deficits  	Psych: Normal affect and mood  	Skin: Warm, without rashes    LABS/STUDIES  --------------------------------------------------------------------------------              9.1    14.3  >-----------<  248      [04-03-18 @ 07:30]              29.3     138  |  102  |  23  ----------------------------<  92      [04-03-18 @ 07:30]  5.0   |  23  |  1.57        Ca     8.8     [04-03-18 @ 07:30]      Mg     1.8     [04-03-18 @ 07:30]    TPro  6.1  /  Alb  3.0  /  TBili  1.0  /  DBili  x   /  AST  18  /  ALT  31  /  AlkPhos  73  [04-03-18 @ 07:30]    PT/INR: PT 13.5 , INR 1.23       [04-03-18 @ 07:30]  PTT: 41.0       [04-03-18 @ 07:30]      Creatinine Trend:  SCr 1.57 [04-03 @ 07:30]  SCr 1.46 [04-02 @ 07:28]  SCr 1.48 [04-01 @ 07:30]  SCr 1.45 [03-31 @ 07:31]  SCr 1.50 [03-30 @ 15:13]    Urinalysis - [03-23-18 @ 16:03]      Color Yellow / Appearance Slightly Turbid / SG 1.020 / pH 5.0      Gluc Negative / Ketone Negative  / Bili Negative / Urobili Negative       Blood Small / Protein 30 / Leuk Est Small / Nitrite Negative      RBC 5 / WBC 8 / Hyaline 0 / Gran  / Sq Epi  / Non Sq Epi 4 / Bacteria Few      Iron 22, TIBC 182, %sat 12      [02-13-18 @ 12:44]  Ferritin 79.0      [02-13-18 @ 12:44]  HbA1c 5.3      [03-18-18 @ 11:20]  TSH 1.48      [02-27-18 @ 08:13]  Lipid: chol 62, TG 33, HDL 17, LDL 38      [06-07-17 @ 06:14]      C3 Complement 135      [03-19-18 @ 15:13]  C4 Complement 37      [03-19-18 @ 15:13]  Free Light Chains: kappa 4.36, lambda 5.71, ratio = 0.76      [03-19 @ 15:13]  Immunofixation Serum:   No Monoclonal Band Identified      [03-19-18 @ 15:13]  Cryoglobulin: Negative      [03-19-18 @ 15:13]

## 2018-04-03 NOTE — PROGRESS NOTE ADULT - PROBLEM SELECTOR PLAN 1
Improved with treatement. He has no evidence of graft dysfunction or low output.  Next biopsy will be next week.

## 2018-04-03 NOTE — PROGRESS NOTE ADULT - ASSESSMENT
Seen sitting in chair.  Mood upbeat. Primary support person (Tahira) scheduled to meet with HT coordinator to review medications and other aspects of HT management  Able to teach back some information on HT education including need for lifelong immunosuppressant medication and need for dietary restrictions. Understands need to stay in close contact with HT coordinator and has agreed to call each morning to "check in."  Slept well last night.  Appetite improving every day. Open to talking about his feelings related to transplant which he finds helpful.  Denies symptoms of anxiety or depression. Receptive to support and validation.     DX: Adjustment disorder       Recommendations:   Behavioral Cardiology will continue to follow as needed.

## 2018-04-03 NOTE — PROGRESS NOTE ADULT - ASSESSMENT
Mr. Baez is a 67 year old man with ACC/AHA stage D chronic systolic heart failure due to NICM s/p HM2 LVAD 6/17 c/b pump thrombosis now s/p heart transplant on 2/23 (CMV D-/R+ and Toxo D-/R+), with post-op course c/b primary graft dysfunction, initially thought to be immune-mediated due to positive B-cell flow (negative CDC cross match) and received plasmapharesis/IVIg x2 with improvement in LV function. His course has been complicated by bilateral IJ/subclavian thrombi and he has a persistent right sided pleural effusion. He does not have clear evidence of allograft dysfunction or low output on exam.     He will be discharged to home today. He will have home services, and will continue treatment of his old driveline exit site with the wound vaccuum.     Plan discussed with 2 Gonz[facility] NP team and in multidisciplinary rounds.

## 2018-04-03 NOTE — PROGRESS NOTE ADULT - PROBLEM SELECTOR PLAN 2
Continue prednisone at 15 mg PO BID.   Continue CellCept 1000 mg BID  Continue tacrolimus 10 mg PO BID  We will continue on aspirin and Pravachol.

## 2018-04-03 NOTE — PROGRESS NOTE ADULT - PROBLEM SELECTOR PLAN 2
His left kidney with recent sono done shows a small cyst that is showing internal septations. This was present in the past as well. Given immunosuppression, would need to monitor this every few months. Consider getting Urology eval as outpt on discharge with cyst/mass specialist Dr Dane Kiran.

## 2018-04-03 NOTE — PROGRESS NOTE ADULT - ASSESSMENT
67 year old man with ACC/AHA stage D chronic systolic heart failure due to NICM (LVEF 20%) s/p HM2 LVAD 6/17 c/b pump thrombus now s/p OHT 2/23 (CMV D-/R+ and Toxo D-/R+), with post-op course c/b primary graft dysfunction, initially thought to be immune-mediated due to positive B-cell flow (negative CDC cross match) and received plasmapheresis/IVIG x2 with improvement in LV function. Found to have b/l IJ/subclavian thrombi as well. CrCl somewhat decreased from baseline pre-tx.    1) Antimicrobial prophylaxis:  Intermediate risk CMV - Valcyte 450mg daily  Intermediate risk Toxo/PCP - continue Mepron 1500mg PO daily with food.  Thrush - continue Nystatin S/S 5 mL Qid  HCV+ donor/HCV- recipient; HCV (1a) - 3/27 HCV PCR 32,891 (To be treated outpatient, monitoring weekly)  will need a repeat specimen either later this week or at his first clinic visit- will order    2) Leukocytosis improved  - Patient has no signs infection at bedside; no fevers, no chills. No focal complaints, feels well. Note history thrombus, prior episode rejection.  - Monitor for now  - BCX NGTD  - CXR stable      Gary Lujan MD  [phone]  After 5pm/weekends [phone]

## 2018-04-03 NOTE — PROGRESS NOTE ADULT - SUBJECTIVE AND OBJECTIVE BOX
INFECTIOUS DISEASES FOLLOW UP-- Sujey Lujan  [phone]    This is a follow up note for this 68 yo man s/p heart transplant doing well No interval events over the past 24 hours      ROS:  CONSTITUTIONAL:  No fever, good appetite  CARDIOVASCULAR:  No chest pain or palpitations  RESPIRATORY:  No dyspnea  GASTROINTESTINAL:  No nausea, vomiting, diarrhea, or abdominal pain  GENITOURINARY:  No dysuria  NEUROLOGIC:  No headache,     Allergies    No Known Allergies    Intolerances        ANTIBIOTICS/RELEVANT:  antimicrobials  atovaquone Suspension 1500 milliGRAM(s) Oral daily  nystatin    Suspension 145463 Unit(s) Oral every 6 hours  valGANciclovir 450 milliGRAM(s) Oral daily    immunologic:  mycophenolate mofetil 1000 milliGRAM(s) Oral <User Schedule>  tacrolimus 10 milliGRAM(s) Oral <User Schedule>    OTHER:  acetaminophen   Tablet. 650 milliGRAM(s) Oral every 6 hours PRN  ALBUTerol/ipratropium for Nebulization 3 milliLiter(s) Nebulizer every 6 hours  aspirin enteric coated 81 milliGRAM(s) Oral daily  Calcium Citrate + Vit D, 315 mg/200 Unit 2 Tablet(s) 2 Tablet(s) Oral two times a day  diltiazem    milliGRAM(s) Oral daily  docusate sodium 100 milliGRAM(s) Oral three times a day  famotidine    Tablet 20 milliGRAM(s) Oral daily  insulin lispro (HumaLOG) corrective regimen sliding scale   SubCutaneous three times a day before meals  insulin lispro (HumaLOG) corrective regimen sliding scale   SubCutaneous at bedtime  magnesium oxide 800 milliGRAM(s) Oral every 12 hours  multivitamin 1 Tablet(s) Oral daily  pravastatin 20 milliGRAM(s) Oral at bedtime  silver sulfADIAZINE 1% Cream 1 Application(s) Topical two times a day  sodium bicarbonate 650 milliGRAM(s) Oral three times a day      Objective:  Vital Signs Last 24 Hrs  T(C): 36.7 (03 Apr 2018 15:00), Max: 36.9 (02 Apr 2018 19:01)  T(F): 98 (03 Apr 2018 15:00), Max: 98.5 (02 Apr 2018 19:01)  HR: 100 (03 Apr 2018 15:00) (100 - 110)  BP: 101/66 (03 Apr 2018 15:00) (101/66 - 114/84)  BP(mean): --  RR: 18 (03 Apr 2018 15:00) (18 - 18)  SpO2: 99% (03 Apr 2018 15:00) (99% - 99%)    PHYSICAL EXAM:  Constitutional:no acute distress  Eyes:WALT, EOMI  Ear/Nose/Throat: no oral lesions, 	  Respiratory: clear BL decreased at right base  sternotomy site healed  wound vac over old LVAD site  Cardiovascular: S1S2  Gastrointestinal:soft, (+) BS, no tenderness  Extremities:no e/e/c, right middle finger unchanged  No Lymphadenopathy  IV sites not inflammed.    LABS:                        9.1    14.3  )-----------( 248      ( 03 Apr 2018 07:30 )             29.3     04-03    138  |  102  |  23  ----------------------------<  92  5.0   |  23  |  1.57<H>    Ca    8.8      03 Apr 2018 07:30  Mg     1.8     04-03    TPro  6.1  /  Alb  3.0<L>  /  TBili  1.0  /  DBili  x   /  AST  18  /  ALT  31  /  AlkPhos  73  04-03    PT/INR - ( 03 Apr 2018 07:30 )   PT: 13.5 sec;   INR: 1.23 ratio         PTT - ( 03 Apr 2018 07:30 )  PTT:41.0 sec      MICROBIOLOGY:            RECENT CULTURES:  03-30 @ 02:30  .Urine Clean Catch (Midstream)  --  --  --    No growth  --  03-29 @ 15:14  .Blood Blood-Peripheral  --  --  --    No growth at 5 days.  --      RADIOLOGY & ADDITIONAL STUDIES:    < from: Xray Chest 1 View- PORTABLE-Routine (04.03.18 @ 05:58) >    IMPRESSION:   Small right pleural effusion.    < end of copied text >

## 2018-04-04 ENCOUNTER — RESULT REVIEW (OUTPATIENT)
Age: 68
End: 2018-04-04

## 2018-04-04 LAB
ALBUMIN SERPL ELPH-MCNC: 3.1 G/DL — LOW (ref 3.3–5)
ALBUMIN SERPL ELPH-MCNC: 3.1 G/DL — LOW (ref 3.3–5)
ALP SERPL-CCNC: 68 U/L — SIGNIFICANT CHANGE UP (ref 40–120)
ALP SERPL-CCNC: 71 U/L — SIGNIFICANT CHANGE UP (ref 40–120)
ALT FLD-CCNC: 23 U/L RC — SIGNIFICANT CHANGE UP (ref 10–45)
ALT FLD-CCNC: 25 U/L RC — SIGNIFICANT CHANGE UP (ref 10–45)
ANION GAP SERPL CALC-SCNC: 11 MMOL/L — SIGNIFICANT CHANGE UP (ref 5–17)
ANION GAP SERPL CALC-SCNC: 14 MMOL/L — SIGNIFICANT CHANGE UP (ref 5–17)
APTT BLD: 31.7 SEC — SIGNIFICANT CHANGE UP (ref 27.5–37.4)
AST SERPL-CCNC: 12 U/L — SIGNIFICANT CHANGE UP (ref 10–40)
AST SERPL-CCNC: 22 U/L — SIGNIFICANT CHANGE UP (ref 10–40)
BILIRUB SERPL-MCNC: 0.7 MG/DL — SIGNIFICANT CHANGE UP (ref 0.2–1.2)
BILIRUB SERPL-MCNC: 0.7 MG/DL — SIGNIFICANT CHANGE UP (ref 0.2–1.2)
BUN SERPL-MCNC: 33 MG/DL — HIGH (ref 7–23)
BUN SERPL-MCNC: 34 MG/DL — HIGH (ref 7–23)
CALCIUM SERPL-MCNC: 8.6 MG/DL — SIGNIFICANT CHANGE UP (ref 8.4–10.5)
CALCIUM SERPL-MCNC: 9.4 MG/DL — SIGNIFICANT CHANGE UP (ref 8.4–10.5)
CHLORIDE SERPL-SCNC: 103 MMOL/L — SIGNIFICANT CHANGE UP (ref 96–108)
CHLORIDE SERPL-SCNC: 105 MMOL/L — SIGNIFICANT CHANGE UP (ref 96–108)
CO2 SERPL-SCNC: 21 MMOL/L — LOW (ref 22–31)
CO2 SERPL-SCNC: 25 MMOL/L — SIGNIFICANT CHANGE UP (ref 22–31)
CREAT SERPL-MCNC: 2.01 MG/DL — HIGH (ref 0.5–1.3)
CREAT SERPL-MCNC: 2.14 MG/DL — HIGH (ref 0.5–1.3)
GLUCOSE BLDC GLUCOMTR-MCNC: 103 MG/DL — HIGH (ref 70–99)
GLUCOSE BLDC GLUCOMTR-MCNC: 132 MG/DL — HIGH (ref 70–99)
GLUCOSE BLDC GLUCOMTR-MCNC: 150 MG/DL — HIGH (ref 70–99)
GLUCOSE BLDC GLUCOMTR-MCNC: 98 MG/DL — SIGNIFICANT CHANGE UP (ref 70–99)
GLUCOSE SERPL-MCNC: 100 MG/DL — HIGH (ref 70–99)
GLUCOSE SERPL-MCNC: 99 MG/DL — SIGNIFICANT CHANGE UP (ref 70–99)
HCT VFR BLD CALC: 28.3 % — LOW (ref 39–50)
HGB BLD-MCNC: 8.8 G/DL — LOW (ref 13–17)
INR BLD: 1.09 RATIO — SIGNIFICANT CHANGE UP (ref 0.88–1.16)
MAGNESIUM SERPL-MCNC: 2.1 MG/DL — SIGNIFICANT CHANGE UP (ref 1.6–2.6)
MAGNESIUM SERPL-MCNC: 2.1 MG/DL — SIGNIFICANT CHANGE UP (ref 1.6–2.6)
MCHC RBC-ENTMCNC: 29.4 PG — SIGNIFICANT CHANGE UP (ref 27–34)
MCHC RBC-ENTMCNC: 31.2 GM/DL — LOW (ref 32–36)
MCV RBC AUTO: 94.3 FL — SIGNIFICANT CHANGE UP (ref 80–100)
PLATELET # BLD AUTO: 256 K/UL — SIGNIFICANT CHANGE UP (ref 150–400)
POTASSIUM SERPL-MCNC: 5.1 MMOL/L — SIGNIFICANT CHANGE UP (ref 3.5–5.3)
POTASSIUM SERPL-MCNC: 6.1 MMOL/L — HIGH (ref 3.5–5.3)
POTASSIUM SERPL-SCNC: 5.1 MMOL/L — SIGNIFICANT CHANGE UP (ref 3.5–5.3)
POTASSIUM SERPL-SCNC: 6.1 MMOL/L — HIGH (ref 3.5–5.3)
PROT SERPL-MCNC: 5.5 G/DL — LOW (ref 6–8.3)
PROT SERPL-MCNC: 6.3 G/DL — SIGNIFICANT CHANGE UP (ref 6–8.3)
PROTHROM AB SERPL-ACNC: 11.8 SEC — SIGNIFICANT CHANGE UP (ref 9.8–12.7)
RBC # BLD: 3 M/UL — LOW (ref 4.2–5.8)
RBC # FLD: 18.9 % — HIGH (ref 10.3–14.5)
SODIUM SERPL-SCNC: 138 MMOL/L — SIGNIFICANT CHANGE UP (ref 135–145)
SODIUM SERPL-SCNC: 141 MMOL/L — SIGNIFICANT CHANGE UP (ref 135–145)
TACROLIMUS SERPL-MCNC: 13 NG/ML — SIGNIFICANT CHANGE UP
WBC # BLD: 13.1 K/UL — HIGH (ref 3.8–10.5)
WBC # FLD AUTO: 13.1 K/UL — HIGH (ref 3.8–10.5)

## 2018-04-04 PROCEDURE — 88307 TISSUE EXAM BY PATHOLOGIST: CPT | Mod: 26

## 2018-04-04 PROCEDURE — 99233 SBSQ HOSP IP/OBS HIGH 50: CPT

## 2018-04-04 PROCEDURE — 88350 IMFLUOR EA ADDL 1ANTB STN PX: CPT | Mod: 26

## 2018-04-04 PROCEDURE — 88346 IMFLUOR 1ST 1ANTB STAIN PX: CPT | Mod: 26

## 2018-04-04 PROCEDURE — 99233 SBSQ HOSP IP/OBS HIGH 50: CPT | Mod: GC

## 2018-04-04 PROCEDURE — 93505 ENDOMYOCARDIAL BIOPSY: CPT | Mod: 26

## 2018-04-04 PROCEDURE — 71045 X-RAY EXAM CHEST 1 VIEW: CPT | Mod: 26

## 2018-04-04 PROCEDURE — 93451 RIGHT HEART CATH: CPT | Mod: 26,59

## 2018-04-04 PROCEDURE — 88342 IMHCHEM/IMCYTCHM 1ST ANTB: CPT | Mod: 26

## 2018-04-04 PROCEDURE — 88313 SPECIAL STAINS GROUP 2: CPT | Mod: 26

## 2018-04-04 PROCEDURE — 88341 IMHCHEM/IMCYTCHM EA ADD ANTB: CPT | Mod: 26

## 2018-04-04 PROCEDURE — 99232 SBSQ HOSP IP/OBS MODERATE 35: CPT

## 2018-04-04 RX ORDER — TACROLIMUS 5 MG/1
10 CAPSULE ORAL
Qty: 0 | Refills: 0 | Status: DISCONTINUED | OUTPATIENT
Start: 2018-04-05 | End: 2018-04-05

## 2018-04-04 RX ORDER — TACROLIMUS 5 MG/1
9 CAPSULE ORAL
Qty: 0 | Refills: 0 | Status: COMPLETED | OUTPATIENT
Start: 2018-04-04 | End: 2018-04-04

## 2018-04-04 RX ORDER — HEPARIN SODIUM 5000 [USP'U]/ML
14000 INJECTION INTRAVENOUS; SUBCUTANEOUS
Qty: 0 | Refills: 0 | Status: DISCONTINUED | OUTPATIENT
Start: 2018-04-04 | End: 2018-04-07

## 2018-04-04 RX ORDER — VALGANCICLOVIR 450 MG/1
450 TABLET, FILM COATED ORAL
Qty: 0 | Refills: 0 | Status: DISCONTINUED | OUTPATIENT
Start: 2018-04-04 | End: 2018-04-07

## 2018-04-04 RX ADMIN — MAGNESIUM OXIDE 400 MG ORAL TABLET 800 MILLIGRAM(S): 241.3 TABLET ORAL at 06:09

## 2018-04-04 RX ADMIN — FAMOTIDINE 20 MILLIGRAM(S): 10 INJECTION INTRAVENOUS at 12:30

## 2018-04-04 RX ADMIN — Medication 500000 UNIT(S): at 12:29

## 2018-04-04 RX ADMIN — Medication 650 MILLIGRAM(S): at 06:17

## 2018-04-04 RX ADMIN — Medication 15 MILLIGRAM(S): at 20:02

## 2018-04-04 RX ADMIN — TACROLIMUS 10 MILLIGRAM(S): 5 CAPSULE ORAL at 08:04

## 2018-04-04 RX ADMIN — Medication 1 APPLICATION(S): at 06:11

## 2018-04-04 RX ADMIN — Medication 81 MILLIGRAM(S): at 12:30

## 2018-04-04 RX ADMIN — Medication 650 MILLIGRAM(S): at 22:37

## 2018-04-04 RX ADMIN — TACROLIMUS 9 MILLIGRAM(S): 5 CAPSULE ORAL at 20:14

## 2018-04-04 RX ADMIN — ATOVAQUONE 1500 MILLIGRAM(S): 750 SUSPENSION ORAL at 12:30

## 2018-04-04 RX ADMIN — MYCOPHENOLATE MOFETIL 1000 MILLIGRAM(S): 250 CAPSULE ORAL at 08:04

## 2018-04-04 RX ADMIN — Medication 650 MILLIGRAM(S): at 06:09

## 2018-04-04 RX ADMIN — Medication 500000 UNIT(S): at 00:07

## 2018-04-04 RX ADMIN — Medication 1 APPLICATION(S): at 17:33

## 2018-04-04 RX ADMIN — Medication 360 MILLIGRAM(S): at 06:10

## 2018-04-04 RX ADMIN — MYCOPHENOLATE MOFETIL 1000 MILLIGRAM(S): 250 CAPSULE ORAL at 20:02

## 2018-04-04 RX ADMIN — Medication 1 TABLET(S): at 12:30

## 2018-04-04 RX ADMIN — VALGANCICLOVIR 450 MILLIGRAM(S): 450 TABLET, FILM COATED ORAL at 12:30

## 2018-04-04 RX ADMIN — Medication 3 MILLILITER(S): at 17:32

## 2018-04-04 RX ADMIN — Medication 3 MILLILITER(S): at 06:09

## 2018-04-04 RX ADMIN — Medication 650 MILLIGRAM(S): at 13:18

## 2018-04-04 RX ADMIN — Medication 3 MILLILITER(S): at 12:30

## 2018-04-04 RX ADMIN — Medication 500000 UNIT(S): at 17:33

## 2018-04-04 RX ADMIN — Medication 20 MILLIGRAM(S): at 22:37

## 2018-04-04 RX ADMIN — MAGNESIUM OXIDE 400 MG ORAL TABLET 800 MILLIGRAM(S): 241.3 TABLET ORAL at 17:32

## 2018-04-04 RX ADMIN — HEPARIN SODIUM 14000 UNIT(S): 5000 INJECTION INTRAVENOUS; SUBCUTANEOUS at 22:36

## 2018-04-04 RX ADMIN — Medication 3 MILLILITER(S): at 00:07

## 2018-04-04 RX ADMIN — Medication 650 MILLIGRAM(S): at 06:47

## 2018-04-04 RX ADMIN — Medication 15 MILLIGRAM(S): at 08:05

## 2018-04-04 RX ADMIN — Medication 500000 UNIT(S): at 06:09

## 2018-04-04 NOTE — PROGRESS NOTE ADULT - SUBJECTIVE AND OBJECTIVE BOX
VASCULAR MEDICINE                         CC:  UEDVT    INTERVAL HISTORY:     No CP or SOB. R arm without symptoms.  Creatinine up.  S/P Biopsy today .   MEDICATIONS  (STANDING):  ALBUTerol/ipratropium for Nebulization 3 milliLiter(s) Nebulizer every 6 hours  aspirin enteric coated 81 milliGRAM(s) Oral daily  atovaquone Suspension 1500 milliGRAM(s) Oral daily  Calcium Citrate + Vit D, 315 mg/200 Unit 2 Tablet(s) 2 Tablet(s) Oral two times a day  diltiazem    milliGRAM(s) Oral daily  docusate sodium 100 milliGRAM(s) Oral three times a day  famotidine    Tablet 20 milliGRAM(s) Oral daily  heparin  Injectable 10316 Unit(s) SubCutaneous <User Schedule>  insulin lispro (HumaLOG) corrective regimen sliding scale   SubCutaneous three times a day before meals  insulin lispro (HumaLOG) corrective regimen sliding scale   SubCutaneous at bedtime  magnesium oxide 800 milliGRAM(s) Oral every 12 hours  multivitamin 1 Tablet(s) Oral daily  mycophenolate mofetil 1000 milliGRAM(s) Oral <User Schedule>  nystatin    Suspension 142902 Unit(s) Oral every 6 hours  pravastatin 20 milliGRAM(s) Oral at bedtime  predniSONE   Tablet 15 milliGRAM(s) Oral <User Schedule>  silver sulfADIAZINE 1% Cream 1 Application(s) Topical two times a day  sodium bicarbonate 650 milliGRAM(s) Oral three times a day  tacrolimus 10 milliGRAM(s) Oral <User Schedule>  valGANciclovir 450 milliGRAM(s) Oral <User Schedule>    MEDICATIONS  (PRN):  acetaminophen   Tablet. 650 milliGRAM(s) Oral every 6 hours PRN Mild Pain (1 - 3)  REVIEW OF SYSTEMS:  CONSTITUTIONAL: No fever, weight loss, or fatigue  EYES: No eye pain, visual disturbances, or discharge  ENMT:  No difficulty hearing, tinnitus, vertigo; No sinus or throat pain  NECK: No pain or stiffness  RESPIRATORY: No cough, wheezing, chills or hemoptysis; No Shortness of Breath  CARDIOVASCULAR: No chest pain, palpitations, passing out, dizziness, or leg swelling  GASTROINTESTINAL: No abdominal or epigastric pain. No nausea, vomiting, or hematemesis; No diarrhea or constipation. No melena or hematochezia.  GENITOURINARY: No dysuria, frequency, hematuria, or incontinence  NEUROLOGICAL: No headaches, memory loss, loss of strength, numbness, or tremors  SKIN: No itching, burning, rashes, or lesions   LYMPH Nodes: No enlarged glands  ENDOCRINE: No heat or cold intolerance; No hair loss  MUSCULOSKELETAL: No joint pain or swelling; No muscle, back, or extremity pain  PSYCHIATRIC: No depression, anxiety, mood swings, or difficulty sleeping  HEME/LYMPH: No easy bruising, or bleeding gums  ALLERY AND IMMUNOLOGIC: No hives or eczema	    [x ] All others negative	  [ ] Unable to obtain  ICU Vital Signs Last 24 Hrs  T(C): 36.8 (04 Apr 2018 10:30), Max: 37 (03 Apr 2018 23:19)  T(F): 98.2 (04 Apr 2018 10:30), Max: 98.6 (03 Apr 2018 23:19)  HR: 101 (04 Apr 2018 13:22) (94 - 103)  BP: 106/75 (04 Apr 2018 13:22) (98/68 - 116/85)  BP(mean): 85 (04 Apr 2018 13:22) (78 - 97)  ABP: --  ABP(mean): --  RR: 18 (04 Apr 2018 13:22) (17 - 18)  SpO2: 99% (04 Apr 2018 13:22) (98% - 99%)    Appearance: Normal	  HEENT:   Normal oral mucosa, PERRL, EOMI	  Lymphatic: No lymphadenopathy  Cardiovascular: Normal S1 S2   Respiratory: Lungs clear to auscultation	  Psychiatry: A & O x 3, Mood & affect appropriate  Gastrointestinal:  Soft, Non-tender, + BS	  Skin: No rashes, No ecchymoses, No cyanosis	  Neurologic: Non-focal  Extremities:  Very mild Right upper extremity bicep area fullness and edema.  Forearm without edema.  Strong Right radial pulse.  The 3rd digit is has distal eschar.  The dorsal aspect is larger and has a small break in the skin.                                8.8    13.1  )-----------( 256      ( 04 Apr 2018 07:31 )             28.3   04-04    141  |  105  |  34<H>  ----------------------------<  100<H>  5.1   |  25  |  2.01<H>    Ca    9.4      04 Apr 2018 10:38  Mg     2.1     04-04    TPro  5.5<L>  /  Alb  3.1<L>  /  TBili  0.7  /  DBili  x   /  AST  12  /  ALT  23  /  AlkPhos  68  04-04

## 2018-04-04 NOTE — PROGRESS NOTE ADULT - ASSESSMENT
67 year old man with ACC/AHA stage D chronic systolic heart failure due to NICM (LVEF 20%) s/p HM2 LVAD 6/17 c/b pump thrombus now s/p OHT 2/23 (CMV D-/R+ and Toxo D-/R+), with post-op course c/b primary graft dysfunction, initially thought to be immune-mediated due to positive B-cell flow (negative CDC cross match) and received plasmapheresis/IVIG x2 with improvement in LV function. Found to have b/l IJ/subclavian thrombi as well. CrCl somewhat decreased from baseline pre-tx.    1) Antimicrobial prophylaxis:  Intermediate risk CMV - Valcyte 450mg adjusted to tiw based upon rising creatinine  Intermediate risk Toxo/PCP - continue Mepron 1500mg PO daily with food.  Thrush - continue Nystatin S/S 5 mL Qid  HCV+ donor/HCV- recipient; HCV (1a) - 3/27 HCV PCR 32,891 (To be treated outpatient, monitoring weekly)  will need a repeat specimen either later this week or at his first clinic visit- will order    2) Leukocytosis improved        Gary Lujan MD  [phone]  After 5pm/weekends [phone]

## 2018-04-04 NOTE — PROGRESS NOTE ADULT - PROBLEM SELECTOR PLAN 2
His left kidney with recent sono done shows a small cyst that is showing internal septations. This was present in the past as well. Given immunosuppression, would need to monitor this every few months. Consider getting Urology eval as outpt on discharge with cyst/mass specialist Dr Dane Kiran. in the setting of NORMAN and tacrolimus use. Consider decreasing tacrolimus dose. Recommend to give kayexalate. Repeat BMP. Monitor K.

## 2018-04-04 NOTE — PROGRESS NOTE ADULT - ASSESSMENT
Assessment:  1.  Bilateral, extensive upper extremity DVT  - Currently on lovenox  - Swelling of RUE controlled with elevation and ace wrap  2.  Heart Transplant  3.  Acute renal failure  4.  Right 3rf digit ischemia - stable  5.  Right foot bunion      Plan  1  Creatinine is up today - please switch over to heparin sub Q full dose as before - discussed with Dr. Roy and SHELLY Caba  2.  Arm elevation and wrapping        Thanks      [facility] [id number]

## 2018-04-04 NOTE — PROGRESS NOTE ADULT - PROBLEM SELECTOR PLAN 6
Continue atovaquone for PJP and toxoplasmosis prophylaxis  Continue valganciclovir for CMV prophylaxis (intermediate risk).   Continue nystatin for thrush prophylaxis Continue atovaquone for PJP and toxoplasmosis prophylaxis  Continue valganciclovir for CMV prophylaxis (intermediate risk). Will adjust dosing given renal dysfunction.   Continue nystatin for thrush prophylaxis

## 2018-04-04 NOTE — PROGRESS NOTE ADULT - SUBJECTIVE AND OBJECTIVE BOX
[facility] DIVISION OF KIDNEY DISEASES AND HYPERTENSION -- FOLLOW UP NOTE  --------------------------------------------------------------------------------  Chief Complaint:  NORMAN     24 hour events/subjective:  SCr uprended today and pt hyperkalemic. Pt denies any complaints; no issues with urination.       PAST HISTORY  --------------------------------------------------------------------------------  No significant changes to PMH, PSH, FHx, SHx, unless otherwise noted    ALLERGIES & MEDICATIONS  --------------------------------------------------------------------------------  Allergies    No Known Allergies    Intolerances      Standing Inpatient Medications  ALBUTerol/ipratropium for Nebulization 3 milliLiter(s) Nebulizer every 6 hours  aspirin enteric coated 81 milliGRAM(s) Oral daily  atovaquone Suspension 1500 milliGRAM(s) Oral daily  Calcium Citrate + Vit D, 315 mg/200 Unit 2 Tablet(s) 2 Tablet(s) Oral two times a day  diltiazem    milliGRAM(s) Oral daily  docusate sodium 100 milliGRAM(s) Oral three times a day  famotidine    Tablet 20 milliGRAM(s) Oral daily  insulin lispro (HumaLOG) corrective regimen sliding scale   SubCutaneous three times a day before meals  insulin lispro (HumaLOG) corrective regimen sliding scale   SubCutaneous at bedtime  magnesium oxide 800 milliGRAM(s) Oral every 12 hours  multivitamin 1 Tablet(s) Oral daily  mycophenolate mofetil 1000 milliGRAM(s) Oral <User Schedule>  nystatin    Suspension 334106 Unit(s) Oral every 6 hours  pravastatin 20 milliGRAM(s) Oral at bedtime  predniSONE   Tablet 15 milliGRAM(s) Oral <User Schedule>  silver sulfADIAZINE 1% Cream 1 Application(s) Topical two times a day  sodium bicarbonate 650 milliGRAM(s) Oral three times a day  tacrolimus 10 milliGRAM(s) Oral <User Schedule>  valGANciclovir 450 milliGRAM(s) Oral daily    PRN Inpatient Medications  acetaminophen   Tablet. 650 milliGRAM(s) Oral every 6 hours PRN      REVIEW OF SYSTEMS  --------------------------------------------------------------------------------  Gen: no fatigue, fevers/chills, weakness  Skin: No rashes  Respiratory: No dyspnea, cough  CV: No chest pain  GI: No abdominal pain, diarrhea  : No dysuria, hematuria  MSK: No edema  Neuro: no confusion    VITALS/PHYSICAL EXAM  --------------------------------------------------------------------------------  T(C): 36.8 (04-04-18 @ 10:30), Max: 37 (04-03-18 @ 23:19)  HR: 94 (04-04-18 @ 10:30) (94 - 103)  BP: 116/85 (04-04-18 @ 10:30) (98/68 - 116/85)  RR: 18 (04-04-18 @ 10:30) (17 - 18)  SpO2: 99% (04-04-18 @ 10:30) (98% - 99%)  Wt(kg): --        04-03-18 @ 07:01  -  04-04-18 @ 07:00  --------------------------------------------------------  IN: 1437 mL / OUT: 1225 mL / NET: 212 mL    04-04-18 @ 07:01  -  04-04-18 @ 12:25  --------------------------------------------------------  IN: 0 mL / OUT: 200 mL / NET: -200 mL      Physical Exam:  	Gen: NAD, well-appearing  	Pulm: CTA B/L  	CV: RRR, S1S2; no rub  	Abd: +BS, soft, nontender/nondistended  	UE: Warm, no edema  	LE: Warm, no edema              : no suprapubic distension or ttp  	Neuro: No focal deficits  	Psych: Normal affect and mood  	Skin: Warm, without rashes    LABS/STUDIES  --------------------------------------------------------------------------------              8.8    13.1  >-----------<  256      [04-04-18 @ 07:31]              28.3     141  |  105  |  34  ----------------------------<  100      [04-04-18 @ 10:38]  5.1   |  25  |  2.01        Ca     9.4     [04-04-18 @ 10:38]      Mg     2.1     [04-04-18 @ 10:38]    TPro  5.5  /  Alb  3.1  /  TBili  0.7  /  DBili  x   /  AST  12  /  ALT  23  /  AlkPhos  68  [04-04-18 @ 10:38]    PT/INR: PT 11.8 , INR 1.09       [04-04-18 @ 07:31]  PTT: 31.7       [04-04-18 @ 07:31]      Creatinine Trend:  SCr 2.01 [04-04 @ 10:38]  SCr 2.14 [04-04 @ 07:31]  SCr 1.57 [04-03 @ 07:30]  SCr 1.46 [04-02 @ 07:28]  SCr 1.48 [04-01 @ 07:30]    Urinalysis - [03-23-18 @ 16:03]      Color Yellow / Appearance Slightly Turbid / SG 1.020 / pH 5.0      Gluc Negative / Ketone Negative  / Bili Negative / Urobili Negative       Blood Small / Protein 30 / Leuk Est Small / Nitrite Negative      RBC 5 / WBC 8 / Hyaline 0 / Gran  / Sq Epi  / Non Sq Epi 4 / Bacteria Few      Iron 22, TIBC 182, %sat 12      [02-13-18 @ 12:44]  Ferritin 79.0      [02-13-18 @ 12:44]  HbA1c 5.3      [03-18-18 @ 11:20]  TSH 1.48      [02-27-18 @ 08:13]  Lipid: chol 62, TG 33, HDL 17, LDL 38      [06-07-17 @ 06:14]      C3 Complement 135      [03-19-18 @ 15:13]  C4 Complement 37      [03-19-18 @ 15:13]  Free Light Chains: kappa 4.36, lambda 5.71, ratio = 0.76      [03-19 @ 15:13]  Immunofixation Serum:   No Monoclonal Band Identified      [03-19-18 @ 15:13]  Cryoglobulin: Negative      [03-19-18 @ 15:13]

## 2018-04-04 NOTE — PROGRESS NOTE ADULT - PROBLEM SELECTOR PLAN 1
Pt. with hemodynamically mediated NORMAN post cardiac transplant, in the setting of tacrolimus, vancomycin and bactrim use. Renal function was stable, but now sCr from 1.5 to 2.1 overnight. Consider decreasing tacro level. Obtain post void bladder sono to r/o retention. Monitor BMP. Strict I/Os. Avoid nephrotoxins. Renally dose all medications. Pt. with hemodynamically mediated NORMAN post cardiac transplant, in the setting of tacrolimus, vancomycin and bactrim use. Renal function was stable, but sCr increased from 1.5 to 2.1 overnight, ??tacrolimus related NORMAN. Consider decreasing tacrolimus dose. Obtain post void bladder sono to r/o urinary retention. Monitor BMP. Strict I/Os. Avoid nephrotoxins. Renally dose all medications.

## 2018-04-04 NOTE — PROGRESS NOTE ADULT - PROBLEM SELECTOR PLAN 2
Continue prednisone at 15 mg PO BID.   Continue CellCept 1000 mg BID  Give 9 mg of tacrolimus today and 10 mg in the morning. He continues on diltiazem to help maintain tacro levels.   We will continue on aspirin and Pravachol.

## 2018-04-04 NOTE — PROGRESS NOTE ADULT - PROBLEM SELECTOR PLAN 3
Consider decreasing tacro dose. Treat medically. Repeat BMP. Monitor K. His left kidney with recent sono done shows a small cyst that is showing internal septations. This was present in the past as well. Given immunosuppression, would need to monitor this every few months. Consider getting Urology eval as outpt on discharge with cyst/mass specialist Dr Dane Kiran.

## 2018-04-04 NOTE — PROGRESS NOTE ADULT - SUBJECTIVE AND OBJECTIVE BOX
Subjective: He feels well today without specific complaints. He has no shortness of breath. He is not dizzy. No abdominal pain or discomfort.     Medications:  acetaminophen   Tablet. 650 milliGRAM(s) Oral every 6 hours PRN  ALBUTerol/ipratropium for Nebulization 3 milliLiter(s) Nebulizer every 6 hours  aspirin enteric coated 81 milliGRAM(s) Oral daily  atovaquone Suspension 1500 milliGRAM(s) Oral daily  Calcium Citrate + Vit D, 315 mg/200 Unit 2 Tablet(s) 2 Tablet(s) Oral two times a day  diltiazem    milliGRAM(s) Oral daily  docusate sodium 100 milliGRAM(s) Oral three times a day  famotidine    Tablet 20 milliGRAM(s) Oral daily  heparin  Injectable 69646 Unit(s) SubCutaneous <User Schedule>  insulin lispro (HumaLOG) corrective regimen sliding scale   SubCutaneous three times a day before meals  insulin lispro (HumaLOG) corrective regimen sliding scale   SubCutaneous at bedtime  magnesium oxide 800 milliGRAM(s) Oral every 12 hours  multivitamin 1 Tablet(s) Oral daily  mycophenolate mofetil 1000 milliGRAM(s) Oral <User Schedule>  nystatin    Suspension 467702 Unit(s) Oral every 6 hours  pravastatin 20 milliGRAM(s) Oral at bedtime  predniSONE   Tablet 15 milliGRAM(s) Oral <User Schedule>  silver sulfADIAZINE 1% Cream 1 Application(s) Topical two times a day  sodium bicarbonate 650 milliGRAM(s) Oral three times a day  tacrolimus 10 milliGRAM(s) Oral <User Schedule>  valGANciclovir 450 milliGRAM(s) Oral <User Schedule>      Physical Exam:    Vital Signs Last 24 Hrs  T(C): 36.4 (04 Apr 2018 16:20), Max: 37 (03 Apr 2018 23:19)  T(F): 97.6 (04 Apr 2018 16:20), Max: 98.6 (03 Apr 2018 23:19)  HR: 100 (04 Apr 2018 16:20) (94 - 103)  BP: 109/75 (04 Apr 2018 16:20) (98/68 - 116/85)  BP(mean): 88 (04 Apr 2018 16:20) (78 - 97)  RR: 18 (04 Apr 2018 16:20) (17 - 18)  SpO2: 99% (04 Apr 2018 16:20) (98% - 99%)    I&O's Summary    03 Apr 2018 07:01  -  04 Apr 2018 07:00  --------------------------------------------------------  IN: 1437 mL / OUT: 1225 mL / NET: 212 mL    04 Apr 2018 07:01  -  04 Apr 2018 17:29  --------------------------------------------------------  IN: 240 mL / OUT: 525 mL / NET: -285 mL    General: No distress. Comfortable.  HEENT: EOM intact.  Neck: Neck supple. JVP not elevated. No masses  Chest: Clear to auscultation bilaterally  CV: RRR with normal S1 and S2. No murmurs, rub, or gallops. Radial pulses normal.  Abdomen: Soft, non-distended, non-tender  Skin: No rashes or skin breakdown  Neurology: Alert and oriented times three. Sensation intact  Psych: Affect normal    Labs:                        8.8    13.1  )-----------( 256      ( 04 Apr 2018 07:31 )             28.3     04-04    141  |  105  |  34<H>  ----------------------------<  100<H>  5.1   |  25  |  2.01<H>    Ca    9.4      04 Apr 2018 10:38  Mg     2.1     04-04    TPro  5.5<L>  /  Alb  3.1<L>  /  TBili  0.7  /  DBili  x   /  AST  12  /  ALT  23  /  AlkPhos  68  04-04    PT/INR - ( 04 Apr 2018 07:31 )   PT: 11.8 sec;   INR: 1.09 ratio      PTT - ( 04 Apr 2018 07:31 )  PTT:31.7 sec      RHC: RA 14, RVP 40/13, PAP 49/22 (30), PCWP 24, CO/CI 6.3/3.4 PA sat 56.9%.

## 2018-04-04 NOTE — PROGRESS NOTE ADULT - PROBLEM SELECTOR PLAN 1
We will continue current steroid dose at prednisone 15 mg PO BID.   We will await DSA. We will consider the possibility of non-HLA antibodies leading to the persistent C4D positivity given persistent allograft dysfunction.

## 2018-04-04 NOTE — PROGRESS NOTE ADULT - ASSESSMENT
Mr. Baez is a 67 year old man with ACC/AHA stage D chronic systolic heart failure due to NICM s/p HM2 LVAD 6/17 c/b pump thrombosis now s/p heart transplant on 2/23 (CMV D-/R+ and Toxo D-/R+), with post-op course c/b primary graft dysfunction, initially thought to be immune-mediated due to positive B-cell flow (negative CDC cross match) and received plasmapharesis/IVIg x2 with improvement in LV function. His course has been complicated by bilateral IJ/subclavian thrombi and he has a persistent right sided pleural effusion.     Discharge yesterday was aborted as echocardiogram showed evidence of slight worsened LV strain, LVEF of 52%, and slightly diminished right ventricular systolic function. It was determined that he missed five doses of prednisone. RHC today showed elevated biventricular filling pressures with normal cardiac output. Biopsy showed 1R/1A cellular rejection. C4D continues to be diffusely positive. DSA is pending. His tacrolimus level is therapeutic and his renal function is acutely worse of unclear etiology.     Please call me with questions at [phone]. Plan discussed with Dr. Parker and 2 Gonzalo NP team.

## 2018-04-04 NOTE — PROGRESS NOTE ADULT - PROBLEM SELECTOR PLAN 5
Acute increase in creatinine likely related to calcineurin inhibitor effect. Decrease in evening dose of tacro as above.  He continues on bicarbonate and magnesium supplements.

## 2018-04-04 NOTE — PROGRESS NOTE ADULT - SUBJECTIVE AND OBJECTIVE BOX
INFECTIOUS DISEASES FOLLOW UP-- Sujey Lujan  [phone]    This is a follow up note for this  67yMale with s/p heart transplant. creatinine is increasing      ROS:  CONSTITUTIONAL:  No fever, good appetite  CARDIOVASCULAR:  No chest pain or palpitations  RESPIRATORY:  No dyspnea  GASTROINTESTINAL:  No nausea, vomiting, diarrhea, or abdominal pain  GENITOURINARY:  No dysuria  NEUROLOGIC:  No headache,     Allergies    No Known Allergies    Intolerances        ANTIBIOTICS/RELEVANT:  antimicrobials  atovaquone Suspension 1500 milliGRAM(s) Oral daily  nystatin    Suspension 480287 Unit(s) Oral every 6 hours  valGANciclovir 450 milliGRAM(s) Oral <User Schedule>    immunologic:  mycophenolate mofetil 1000 milliGRAM(s) Oral <User Schedule>  tacrolimus 9 milliGRAM(s) Oral <User Schedule>    OTHER:  acetaminophen   Tablet. 650 milliGRAM(s) Oral every 6 hours PRN  ALBUTerol/ipratropium for Nebulization 3 milliLiter(s) Nebulizer every 6 hours  aspirin enteric coated 81 milliGRAM(s) Oral daily  Calcium Citrate + Vit D, 315 mg/200 Unit 2 Tablet(s) 2 Tablet(s) Oral two times a day  diltiazem    milliGRAM(s) Oral daily  docusate sodium 100 milliGRAM(s) Oral three times a day  famotidine    Tablet 20 milliGRAM(s) Oral daily  heparin  Injectable 44992 Unit(s) SubCutaneous <User Schedule>  insulin lispro (HumaLOG) corrective regimen sliding scale   SubCutaneous three times a day before meals  insulin lispro (HumaLOG) corrective regimen sliding scale   SubCutaneous at bedtime  magnesium oxide 800 milliGRAM(s) Oral every 12 hours  multivitamin 1 Tablet(s) Oral daily  pravastatin 20 milliGRAM(s) Oral at bedtime  predniSONE   Tablet 15 milliGRAM(s) Oral <User Schedule>  silver sulfADIAZINE 1% Cream 1 Application(s) Topical two times a day  sodium bicarbonate 650 milliGRAM(s) Oral three times a day      Objective:  Vital Signs Last 24 Hrs  T(C): 36.4 (04 Apr 2018 16:20), Max: 37 (03 Apr 2018 23:19)  T(F): 97.6 (04 Apr 2018 16:20), Max: 98.6 (03 Apr 2018 23:19)  HR: 100 (04 Apr 2018 16:20) (94 - 103)  BP: 109/75 (04 Apr 2018 16:20) (98/68 - 116/85)  BP(mean): 88 (04 Apr 2018 16:20) (78 - 97)  RR: 18 (04 Apr 2018 16:20) (17 - 18)  SpO2: 99% (04 Apr 2018 16:20) (98% - 99%)    PHYSICAL EXAM:  Constitutional:no acute distress  Eyes:WALT, EOMI  Ear/Nose/Throat: no oral lesions, 	  Respiratory: clear BL  old LVAD exit site is granulating  Cardiovascular: S1S2  Gastrointestinal:soft, (+) BS, no tenderness  Extremities:no e/e/c  right middle finger dressing dry/intact  No Lymphadenopathy  IV sites not inflammed.    LABS:                        8.8    13.1  )-----------( 256      ( 04 Apr 2018 07:31 )             28.3     04-04    141  |  105  |  34<H>  ----------------------------<  100<H>  5.1   |  25  |  2.01<H>    Ca    9.4      04 Apr 2018 10:38  Mg     2.1     04-04    TPro  5.5<L>  /  Alb  3.1<L>  /  TBili  0.7  /  DBili  x   /  AST  12  /  ALT  23  /  AlkPhos  68  04-04    PT/INR - ( 04 Apr 2018 07:31 )   PT: 11.8 sec;   INR: 1.09 ratio         PTT - ( 04 Apr 2018 07:31 )  PTT:31.7 sec      MICROBIOLOGY:            RECENT CULTURES:  03-30 @ 02:30  .Urine Clean Catch (Midstream)  --  --  --    No growth  --  03-29 @ 15:14  .Blood Blood-Peripheral  --  --  --    No growth at 5 days.  --      RADIOLOGY & ADDITIONAL STUDIES:    < from: Xray Chest 1 View- PORTABLE-Urgent (04.04.18 @ 06:14) >    IMPRESSION:  Right pleural effusion which may have slightly increased in size.  No pneumothorax.  Cardiac size is not accurately evaluated in this projection.  Sternotomy.    < end of copied text >

## 2018-04-05 ENCOUNTER — APPOINTMENT (OUTPATIENT)
Age: 68
End: 2018-04-05

## 2018-04-05 DIAGNOSIS — J90 PLEURAL EFFUSION, NOT ELSEWHERE CLASSIFIED: ICD-10-CM

## 2018-04-05 LAB
ANION GAP SERPL CALC-SCNC: 11 MMOL/L — SIGNIFICANT CHANGE UP (ref 5–17)
ANION GAP SERPL CALC-SCNC: 11 MMOL/L — SIGNIFICANT CHANGE UP (ref 5–17)
ANION GAP SERPL CALC-SCNC: 14 MMOL/L — SIGNIFICANT CHANGE UP (ref 5–17)
APTT BLD: 30.8 SEC — SIGNIFICANT CHANGE UP (ref 27.5–37.4)
APTT BLD: 31.2 SEC — SIGNIFICANT CHANGE UP (ref 27.5–37.4)
APTT BLD: 34.2 SEC — SIGNIFICANT CHANGE UP (ref 27.5–37.4)
BUN SERPL-MCNC: 37 MG/DL — HIGH (ref 7–23)
BUN SERPL-MCNC: 40 MG/DL — HIGH (ref 7–23)
BUN SERPL-MCNC: 40 MG/DL — HIGH (ref 7–23)
CALCIUM SERPL-MCNC: 8.6 MG/DL — SIGNIFICANT CHANGE UP (ref 8.4–10.5)
CALCIUM SERPL-MCNC: 9.1 MG/DL — SIGNIFICANT CHANGE UP (ref 8.4–10.5)
CALCIUM SERPL-MCNC: 9.2 MG/DL — SIGNIFICANT CHANGE UP (ref 8.4–10.5)
CHLORIDE SERPL-SCNC: 104 MMOL/L — SIGNIFICANT CHANGE UP (ref 96–108)
CHLORIDE SERPL-SCNC: 104 MMOL/L — SIGNIFICANT CHANGE UP (ref 96–108)
CHLORIDE SERPL-SCNC: 105 MMOL/L — SIGNIFICANT CHANGE UP (ref 96–108)
CO2 SERPL-SCNC: 20 MMOL/L — LOW (ref 22–31)
CO2 SERPL-SCNC: 23 MMOL/L — SIGNIFICANT CHANGE UP (ref 22–31)
CO2 SERPL-SCNC: 24 MMOL/L — SIGNIFICANT CHANGE UP (ref 22–31)
CREAT SERPL-MCNC: 1.9 MG/DL — HIGH (ref 0.5–1.3)
CREAT SERPL-MCNC: 1.93 MG/DL — HIGH (ref 0.5–1.3)
CREAT SERPL-MCNC: 1.98 MG/DL — HIGH (ref 0.5–1.3)
GLUCOSE BLDC GLUCOMTR-MCNC: 111 MG/DL — HIGH (ref 70–99)
GLUCOSE BLDC GLUCOMTR-MCNC: 132 MG/DL — HIGH (ref 70–99)
GLUCOSE BLDC GLUCOMTR-MCNC: 133 MG/DL — HIGH (ref 70–99)
GLUCOSE BLDC GLUCOMTR-MCNC: 137 MG/DL — HIGH (ref 70–99)
GLUCOSE BLDC GLUCOMTR-MCNC: 199 MG/DL — HIGH (ref 70–99)
GLUCOSE SERPL-MCNC: 108 MG/DL — HIGH (ref 70–99)
GLUCOSE SERPL-MCNC: 110 MG/DL — HIGH (ref 70–99)
GLUCOSE SERPL-MCNC: 81 MG/DL — SIGNIFICANT CHANGE UP (ref 70–99)
HCT VFR BLD CALC: 28.3 % — LOW (ref 39–50)
HCT VFR BLD CALC: 28.9 % — LOW (ref 39–50)
HGB BLD-MCNC: 8.9 G/DL — LOW (ref 13–17)
HGB BLD-MCNC: 9.2 G/DL — LOW (ref 13–17)
INR BLD: 1.09 RATIO — SIGNIFICANT CHANGE UP (ref 0.88–1.16)
INR BLD: 1.09 RATIO — SIGNIFICANT CHANGE UP (ref 0.88–1.16)
MCHC RBC-ENTMCNC: 29.6 PG — SIGNIFICANT CHANGE UP (ref 27–34)
MCHC RBC-ENTMCNC: 29.8 PG — SIGNIFICANT CHANGE UP (ref 27–34)
MCHC RBC-ENTMCNC: 31.4 GM/DL — LOW (ref 32–36)
MCHC RBC-ENTMCNC: 31.8 GM/DL — LOW (ref 32–36)
MCV RBC AUTO: 93.9 FL — SIGNIFICANT CHANGE UP (ref 80–100)
MCV RBC AUTO: 94.2 FL — SIGNIFICANT CHANGE UP (ref 80–100)
PLATELET # BLD AUTO: 261 K/UL — SIGNIFICANT CHANGE UP (ref 150–400)
PLATELET # BLD AUTO: 270 K/UL — SIGNIFICANT CHANGE UP (ref 150–400)
POTASSIUM SERPL-MCNC: 5.4 MMOL/L — HIGH (ref 3.5–5.3)
POTASSIUM SERPL-MCNC: 5.6 MMOL/L — HIGH (ref 3.5–5.3)
POTASSIUM SERPL-MCNC: 5.6 MMOL/L — HIGH (ref 3.5–5.3)
POTASSIUM SERPL-SCNC: 5.4 MMOL/L — HIGH (ref 3.5–5.3)
POTASSIUM SERPL-SCNC: 5.6 MMOL/L — HIGH (ref 3.5–5.3)
POTASSIUM SERPL-SCNC: 5.6 MMOL/L — HIGH (ref 3.5–5.3)
PROTHROM AB SERPL-ACNC: 11.8 SEC — SIGNIFICANT CHANGE UP (ref 9.8–12.7)
PROTHROM AB SERPL-ACNC: 11.9 SEC — SIGNIFICANT CHANGE UP (ref 9.8–12.7)
RBC # BLD: 3 M/UL — LOW (ref 4.2–5.8)
RBC # BLD: 3.08 M/UL — LOW (ref 4.2–5.8)
RBC # FLD: 18.9 % — HIGH (ref 10.3–14.5)
RBC # FLD: 18.9 % — HIGH (ref 10.3–14.5)
SODIUM SERPL-SCNC: 138 MMOL/L — SIGNIFICANT CHANGE UP (ref 135–145)
SODIUM SERPL-SCNC: 139 MMOL/L — SIGNIFICANT CHANGE UP (ref 135–145)
SODIUM SERPL-SCNC: 139 MMOL/L — SIGNIFICANT CHANGE UP (ref 135–145)
TACROLIMUS SERPL-MCNC: 22.4 NG/ML — SIGNIFICANT CHANGE UP
WBC # BLD: 12.1 K/UL — HIGH (ref 3.8–10.5)
WBC # BLD: 13 K/UL — HIGH (ref 3.8–10.5)
WBC # FLD AUTO: 12.1 K/UL — HIGH (ref 3.8–10.5)
WBC # FLD AUTO: 13 K/UL — HIGH (ref 3.8–10.5)

## 2018-04-05 PROCEDURE — 99233 SBSQ HOSP IP/OBS HIGH 50: CPT

## 2018-04-05 PROCEDURE — 99232 SBSQ HOSP IP/OBS MODERATE 35: CPT

## 2018-04-05 PROCEDURE — 71045 X-RAY EXAM CHEST 1 VIEW: CPT | Mod: 26

## 2018-04-05 RX ORDER — INSULIN HUMAN 100 [IU]/ML
10 INJECTION, SOLUTION SUBCUTANEOUS ONCE
Qty: 0 | Refills: 0 | Status: COMPLETED | OUTPATIENT
Start: 2018-04-05 | End: 2018-04-05

## 2018-04-05 RX ORDER — DILTIAZEM HCL 120 MG
240 CAPSULE, EXT RELEASE 24 HR ORAL DAILY
Qty: 0 | Refills: 0 | Status: DISCONTINUED | OUTPATIENT
Start: 2018-04-05 | End: 2018-04-10

## 2018-04-05 RX ORDER — TACROLIMUS 5 MG/1
9 CAPSULE ORAL
Qty: 0 | Refills: 0 | Status: DISCONTINUED | OUTPATIENT
Start: 2018-04-05 | End: 2018-04-05

## 2018-04-05 RX ORDER — DEXTROSE 50 % IN WATER 50 %
50 SYRINGE (ML) INTRAVENOUS ONCE
Qty: 0 | Refills: 0 | Status: COMPLETED | OUTPATIENT
Start: 2018-04-05 | End: 2018-04-05

## 2018-04-05 RX ORDER — LIDOCAINE HCL 20 MG/ML
20 VIAL (ML) INJECTION ONCE
Qty: 0 | Refills: 0 | Status: DISCONTINUED | OUTPATIENT
Start: 2018-04-05 | End: 2018-04-10

## 2018-04-05 RX ADMIN — Medication 3 MILLILITER(S): at 18:34

## 2018-04-05 RX ADMIN — ATOVAQUONE 1500 MILLIGRAM(S): 750 SUSPENSION ORAL at 12:38

## 2018-04-05 RX ADMIN — Medication 500000 UNIT(S): at 12:38

## 2018-04-05 RX ADMIN — TACROLIMUS 10 MILLIGRAM(S): 5 CAPSULE ORAL at 07:59

## 2018-04-05 RX ADMIN — Medication 500000 UNIT(S): at 18:34

## 2018-04-05 RX ADMIN — Medication 650 MILLIGRAM(S): at 06:39

## 2018-04-05 RX ADMIN — Medication 3 MILLILITER(S): at 06:39

## 2018-04-05 RX ADMIN — Medication 1 APPLICATION(S): at 18:11

## 2018-04-05 RX ADMIN — Medication 650 MILLIGRAM(S): at 14:04

## 2018-04-05 RX ADMIN — MYCOPHENOLATE MOFETIL 1000 MILLIGRAM(S): 250 CAPSULE ORAL at 07:59

## 2018-04-05 RX ADMIN — Medication 500000 UNIT(S): at 06:39

## 2018-04-05 RX ADMIN — Medication 1 TABLET(S): at 12:37

## 2018-04-05 RX ADMIN — Medication 81 MILLIGRAM(S): at 12:38

## 2018-04-05 RX ADMIN — MAGNESIUM OXIDE 400 MG ORAL TABLET 800 MILLIGRAM(S): 241.3 TABLET ORAL at 06:39

## 2018-04-05 RX ADMIN — Medication 1 APPLICATION(S): at 06:39

## 2018-04-05 RX ADMIN — MAGNESIUM OXIDE 400 MG ORAL TABLET 800 MILLIGRAM(S): 241.3 TABLET ORAL at 18:34

## 2018-04-05 RX ADMIN — Medication 50 MILLILITER(S): at 02:23

## 2018-04-05 RX ADMIN — Medication 3 MILLILITER(S): at 12:37

## 2018-04-05 RX ADMIN — HEPARIN SODIUM 14000 UNIT(S): 5000 INJECTION INTRAVENOUS; SUBCUTANEOUS at 09:21

## 2018-04-05 RX ADMIN — Medication 15 MILLIGRAM(S): at 20:03

## 2018-04-05 RX ADMIN — Medication 3 MILLILITER(S): at 23:21

## 2018-04-05 RX ADMIN — TACROLIMUS 9 MILLIGRAM(S): 5 CAPSULE ORAL at 20:05

## 2018-04-05 RX ADMIN — INSULIN HUMAN 10 UNIT(S): 100 INJECTION, SOLUTION SUBCUTANEOUS at 02:22

## 2018-04-05 RX ADMIN — Medication 20 MILLIGRAM(S): at 21:02

## 2018-04-05 RX ADMIN — Medication 650 MILLIGRAM(S): at 21:02

## 2018-04-05 RX ADMIN — FAMOTIDINE 20 MILLIGRAM(S): 10 INJECTION INTRAVENOUS at 12:38

## 2018-04-05 RX ADMIN — MYCOPHENOLATE MOFETIL 1000 MILLIGRAM(S): 250 CAPSULE ORAL at 20:03

## 2018-04-05 RX ADMIN — Medication 500000 UNIT(S): at 01:03

## 2018-04-05 RX ADMIN — Medication 650 MILLIGRAM(S): at 09:21

## 2018-04-05 RX ADMIN — Medication 360 MILLIGRAM(S): at 06:38

## 2018-04-05 RX ADMIN — Medication 3 MILLILITER(S): at 01:03

## 2018-04-05 RX ADMIN — Medication 650 MILLIGRAM(S): at 09:51

## 2018-04-05 RX ADMIN — Medication 500000 UNIT(S): at 23:21

## 2018-04-05 RX ADMIN — Medication 15 MILLIGRAM(S): at 08:09

## 2018-04-05 RX ADMIN — Medication 2: at 12:37

## 2018-04-05 NOTE — PROGRESS NOTE ADULT - PROBLEM SELECTOR PLAN 3
plastics consulted recalled   no change in intervention   fup plastics as outpt  dressing change with silvadene wrap RUE w/ ace wrap,  inominate vein dvt

## 2018-04-05 NOTE — PROGRESS NOTE ADULT - SUBJECTIVE AND OBJECTIVE BOX
VITAL SIGNS-Tele: SR 1st deg. 90    Vital Signs Last 24 Hrs  T(C): 36.6 (18 @ 07:17), Max: 36.6 (18 @ 19:54)  HR: 104 (18 @ 07:17) (95 - 105)  BP: 110/74 (18 @ 07:17) (98/64 - 116/85)  SpO2: 100% (18 @ 07:17) (97% - 100%)          @ 07:01  -   @ 07:00  --------------------------------------------------------  IN: 710 mL / OUT: 825 mL / NET: -115 mL     @ 07:01  -   @ 10:42  --------------------------------------------------------  IN: 360 mL / OUT: 0 mL / NET: 360 mL    Daily     Daily Weight in k (2018 07:30)    CAPILLARY BLOOD GLUCOSE  POCT Blood Glucose.: 111 mg/dL (2018 07:30)  POCT Blood Glucose.: 133 mg/dL (2018 02:22)  POCT Blood Glucose.: 150 mg/dL (2018 22:29)  POCT Blood Glucose.: 132 mg/dL (2018 19:50)        PHYSICAL EXAM:  Neurology: alert and oriented x 3, nonfocal, no gross deficits  CV : S1S2  Sternal Wound :  CDI , Stable  Lungs: diminished BS RLL  Abdomen: soft, nontender, nondistended, positive bowel sounds, last bowel movement         Extremities:     no edema no calf tenderness    acetaminophen   Tablet. 650 milliGRAM(s) Oral every 6 hours PRN  ALBUTerol/ipratropium for Nebulization 3 milliLiter(s) Nebulizer every 6 hours  aspirin enteric coated 81 milliGRAM(s) Oral daily  atovaquone Suspension 1500 milliGRAM(s) Oral daily  Calcium Citrate + Vit D, 315 mg/200 Unit 2 Tablet(s) 2 Tablet(s) Oral two times a day  diltiazem    milliGRAM(s) Oral daily  docusate sodium 100 milliGRAM(s) Oral three times a day  famotidine    Tablet 20 milliGRAM(s) Oral daily  heparin  Injectable 80580 Unit(s) SubCutaneous <User Schedule>  magnesium oxide 800 milliGRAM(s) Oral every 12 hours  multivitamin 1 Tablet(s) Oral daily  mycophenolate mofetil 1000 milliGRAM(s) Oral <User Schedule>  nystatin    Suspension 023549 Unit(s) Oral every 6 hours  pravastatin 20 milliGRAM(s) Oral at bedtime  predniSONE   Tablet 15 milliGRAM(s) Oral <User Schedule>  silver sulfADIAZINE 1% Cream 1 Application(s) Topical two times a day  sodium bicarbonate 650 milliGRAM(s) Oral three times a day  tacrolimus 10 milliGRAM(s) Oral <User Schedule>  valGANciclovir 450 milliGRAM(s) Oral <User Schedule>      Physical Therapy Rec:   Home  [  ]   Home w/ PT  [x  ]  Rehab  [  ]  Discussed with Cardiothoracic Team at AM rounds.

## 2018-04-05 NOTE — PROGRESS NOTE ADULT - SUBJECTIVE AND OBJECTIVE BOX
Subjective:    Medications:  acetaminophen   Tablet. 650 milliGRAM(s) Oral every 6 hours PRN  ALBUTerol/ipratropium for Nebulization 3 milliLiter(s) Nebulizer every 6 hours  aspirin enteric coated 81 milliGRAM(s) Oral daily  atovaquone Suspension 1500 milliGRAM(s) Oral daily  Calcium Citrate + Vit D, 315 mg/200 Unit 2 Tablet(s) 2 Tablet(s) Oral two times a day  diltiazem    milliGRAM(s) Oral daily  docusate sodium 100 milliGRAM(s) Oral three times a day  famotidine    Tablet 20 milliGRAM(s) Oral daily  heparin  Injectable 53779 Unit(s) SubCutaneous <User Schedule>  insulin lispro (HumaLOG) corrective regimen sliding scale   SubCutaneous three times a day before meals  insulin lispro (HumaLOG) corrective regimen sliding scale   SubCutaneous at bedtime  magnesium oxide 800 milliGRAM(s) Oral every 12 hours  multivitamin 1 Tablet(s) Oral daily  mycophenolate mofetil 1000 milliGRAM(s) Oral <User Schedule>  nystatin    Suspension 537912 Unit(s) Oral every 6 hours  pravastatin 20 milliGRAM(s) Oral at bedtime  predniSONE   Tablet 15 milliGRAM(s) Oral <User Schedule>  silver sulfADIAZINE 1% Cream 1 Application(s) Topical two times a day  sodium bicarbonate 650 milliGRAM(s) Oral three times a day  valGANciclovir 450 milliGRAM(s) Oral <User Schedule>      Physical Exam:    Vitals:  T(C): 36.6 (18 @ 11:05), Max: 36.6 (18 @ 19:54)  HR: 99 (18 @ 11:05) (95 - 105)  BP: 107/70 (18 @ 11:05) (98/64 - 116/85)  BP(mean): 97 (18 @ 19:54) (88 - 97)  ABP: --  ABP(mean): --  RR: 18 (18 @ 11:05) (18 - 19)  SpO2: 100% (18 @ 11:05) (97% - 100%)  Wt(kg): --  CVP(cm H2O): --  CO: --  CI: --  PA: --  PA(mean): --  PCWP: --  SVR: --  PVR: --  Vital Signs Last 24 Hrs  T(C): 36.6 (2018 11:05), Max: 36.6 (2018 19:54)  T(F): 97.9 (2018 11:05), Max: 97.9 (2018 19:54)  HR: 99 (2018 11:05) (95 - 105)  BP: 107/70 (2018 11:05) (98/64 - 116/85)  BP(mean): 97 (2018 19:54) (88 - 97)  RR: 18 (2018 11:05) (18 - 19)  SpO2: 100% (2018 11:05) (97% - 100%)    Daily     Daily Weight in k (2018 07:30)    I&O's Summary    2018 07:01  -  2018 07:00  --------------------------------------------------------  IN: 710 mL / OUT: 825 mL / NET: -115 mL    2018 07:01  -  2018 14:48  --------------------------------------------------------  IN: 360 mL / OUT: 0 mL / NET: 360 mL        General: No distress. Comfortable.  HEENT: EOM intact.  Neck: Neck supple. JVP not elevated. No masses  Chest: Clear to auscultation bilaterally  CV: Normal S1 and S2. No murmurs, rub, or gallops. Radial pulses normal.  Abdomen: Soft, non-distended, non-tender  Skin: No rashes or skin breakdown  Neurology: Alert and oriented times three. Sensation intact  Psych: Affect normal    Labs:                        8.9    12.1  )-----------( 261      ( 2018 07:42 )             28.3         138  |  104  |  40<H>  ----------------------------<  110<H>  5.4<H>   |  20<L>  |  1.93<H>    Ca    8.6      2018 07:42  Mg     2.1     04-04    TPro  5.5<L>  /  Alb  3.1<L>  /  TBili  0.7  /  DBili  x   /  AST  12  /  ALT  23  /  AlkPhos  68  04-04    PT/INR - ( 2018 07:42 )   PT: 11.9 sec;   INR: 1.09 ratio         PTT - ( 2018 07:42 )  PTT:31.2 sec Subjective: No current complaints. He feels well without any shortness of breath, dizziness, or headaches.     Medications:  acetaminophen   Tablet. 650 milliGRAM(s) Oral every 6 hours PRN  ALBUTerol/ipratropium for Nebulization 3 milliLiter(s) Nebulizer every 6 hours  aspirin enteric coated 81 milliGRAM(s) Oral daily  atovaquone Suspension 1500 milliGRAM(s) Oral daily  Calcium Citrate + Vit D, 315 mg/200 Unit 2 Tablet(s) 2 Tablet(s) Oral two times a day  diltiazem    milliGRAM(s) Oral daily  docusate sodium 100 milliGRAM(s) Oral three times a day  famotidine    Tablet 20 milliGRAM(s) Oral daily  heparin  Injectable 02675 Unit(s) SubCutaneous <User Schedule>  insulin lispro (HumaLOG) corrective regimen sliding scale   SubCutaneous three times a day before meals  insulin lispro (HumaLOG) corrective regimen sliding scale   SubCutaneous at bedtime  magnesium oxide 800 milliGRAM(s) Oral every 12 hours  multivitamin 1 Tablet(s) Oral daily  mycophenolate mofetil 1000 milliGRAM(s) Oral <User Schedule>  nystatin    Suspension 356411 Unit(s) Oral every 6 hours  pravastatin 20 milliGRAM(s) Oral at bedtime  predniSONE   Tablet 15 milliGRAM(s) Oral <User Schedule>  silver sulfADIAZINE 1% Cream 1 Application(s) Topical two times a day  sodium bicarbonate 650 milliGRAM(s) Oral three times a day  valGANciclovir 450 milliGRAM(s) Oral <User Schedule>      Physical Exam:    Vital Signs Last 24 Hrs  T(C): 36.6 (2018 11:05), Max: 36.6 (2018 19:54)  T(F): 97.9 (2018 11:05), Max: 97.9 (2018 19:54)  HR: 99 (2018 11:05) (95 - 105)  BP: 107/70 (2018 11:05) (98/64 - 116/85)  BP(mean): 97 (2018 19:54) (88 - 97)  RR: 18 (2018 11:05) (18 - 19)  SpO2: 100% (2018 11:05) (97% - 100%)     Daily Weight in k (2018 07:30)    I&O's Summary    2018 07:01  -  2018 07:00  --------------------------------------------------------  IN: 710 mL / OUT: 825 mL / NET: -115 mL    2018 07:01  -  2018 14:48  --------------------------------------------------------  IN: 360 mL / OUT: 0 mL / NET: 360 mL    General: No distress. Comfortable.  HEENT: EOM intact.  Neck: Neck supple. JVP mildly elevated. No masses  Chest: Clear to auscultation bilaterally  CV: RRR with Normal S1 and S2. III/VI systolic murmur heard at LLSB. No rubs or gallops. Radial pulses normal.  Abdomen: Soft, non-distended, non-tender  Skin: No rashes or skin breakdown  Neurology: Alert and oriented times three. Sensation intact  Psych: Affect normal    Labs:                        8.9    12.1  )-----------( 261      ( 2018 07:42 )             28.3     -05    138  |  104  |  40<H>  ----------------------------<  110<H>  5.4<H>   |  20<L>  |  1.93<H>    Ca    8.6      2018 07:42  Mg     2.1     04-04    TPro  5.5<L>  /  Alb  3.1<L>  /  TBili  0.7  /  DBili  x   /  AST  12  /  ALT  23  /  AlkPhos  68  04-04    PT/INR - ( 2018 07:42 )   PT: 11.9 sec;   INR: 1.09 ratio         PTT - ( 2018 07:42 )  PTT:31.2 sec      Tacrolimus (), Serum (18 @ 09:05)    Tacrolimus (), Serum: 13.0:     Tacrolimus (), Serum (18 @ 08:44)    Tacrolimus (), Serum: 22.4:

## 2018-04-05 NOTE — PROGRESS NOTE ADULT - PROBLEM SELECTOR PLAN 1
Pt. with hemodynamically mediated NORMAN in the setting of post cardiac transplant, and tacrolimus, vancomycin and bactrim use. Scr elevated but stable today, likely 2/2 elevated tacrolimus level. Recommend to decrease tacrolimus dose. Obtain post void bladder sono to r/o urinary retention. Monitor BMP. Strict I/Os. Avoid nephrotoxins. Renally dose all medications.

## 2018-04-05 NOTE — PROGRESS NOTE ADULT - PROBLEM SELECTOR PLAN 1
TAC level- 22.4 - will d/w transplant team  and prograf dosing  oob  daily weights TAC level- 22.4 - hold pm dose of prograf  dec. to prograf 9mg po bid   oob  daily weights

## 2018-04-05 NOTE — PROGRESS NOTE ADULT - ASSESSMENT
Mr. Baez is a 67 year old man with ACC/AHA stage D chronic systolic heart failure due to NICM s/p HM2 LVAD 6/17 c/b pump thrombosis now s/p heart transplant on 2/23 (CMV D-/R+ and Toxo D-/R+), with post-op course c/b primary graft dysfunction, initially thought to be immune-mediated due to positive B-cell flow (negative CDC cross match) and received plasmapharesis/IVIg x2 with improvement in LV function. His course has been complicated by bilateral IJ/subclavian thrombi and he has a persistent right sided pleural effusion.     Discharge yesterday was aborted as echocardiogram showed evidence of slight worsened LV strain, LVEF of 52%, and slightly diminished right ventricular systolic function. It was determined that he missed five doses of prednisone. RHC today showed elevated biventricular filling pressures with normal cardiac output. Biopsy showed 1R/1A cellular rejection. C4D continues to be diffusely positive. DSA is unremarkable. His tacrolimus level is supratherapeutic and his renal function is acutely worse of unclear etiology.     Please call me with questions at [phone]. Plan discussed with Dr. Parker and 2 Gonz[facility] NP team. Mr. Baez is a 67 year old man with ACC/AHA stage D chronic systolic heart failure due to NICM s/p HM2 LVAD 6/17 c/b pump thrombosis now s/p heart transplant on 2/23 (CMV D-/R+ and Toxo D-/R+), with post-op course c/b primary graft dysfunction, initially thought to be immune-mediated due to positive B-cell flow (negative CDC cross match) and received plasmapharesis/IVIg x2 with improvement in LV function. His course has been complicated by bilateral IJ/subclavian thrombi and he has a persistent right sided pleural effusion as well as acute on chronic renal failure.     His echocardiogram on 4/3 showed evidence of slight worsened LV strain, LVEF of 52%, and slightly diminished right ventricular systolic function. It was determined that he missed five doses of prednisone. RHC today showed elevated biventricular filling pressures with normal cardiac output. Biopsy showed 1R/1A cellular rejection. C4D continues to be diffusely positive. DSA is unremarkable. His tacrolimus level is supratherapeutic although his renal function is slightly improved today.     Please call me with questions at [phone]. Plan discussed with Dr. Parker and 2 [facility] NP team.

## 2018-04-05 NOTE — PROGRESS NOTE ADULT - PROBLEM SELECTOR PLAN 6
Continue atovaquone for PJP and toxoplasmosis prophylaxis  Continue valganciclovir for CMV prophylaxis (intermediate risk). Will adjust dosing given renal dysfunction.   Continue nystatin for thrush prophylaxis

## 2018-04-05 NOTE — PROGRESS NOTE ADULT - PROBLEM SELECTOR PLAN 2
in the setting of NORMAN and tacrolimus use. Recommend to lower tacrolimus dose. Recommend to give kayexalate if serum potassium remains elevated. Monitor potassium and give low potassium diet.

## 2018-04-05 NOTE — PROGRESS NOTE ADULT - PROBLEM SELECTOR PLAN 4
insulin sliding scale for high dose steroids following cardiac biopsy plastics consulted recalled   no change in intervention   fup plastics as outpt  dressing change with silvadene

## 2018-04-05 NOTE — PROGRESS NOTE ADULT - PROBLEM SELECTOR PLAN 5
Acute increase in creatinine likely related to calcineurin inhibitor effect. Decrease in tacro as above.  He continues on bicarbonate and magnesium supplements.

## 2018-04-05 NOTE — PROGRESS NOTE ADULT - SUBJECTIVE AND OBJECTIVE BOX
INFECTIOUS DISEASES FOLLOW UP-- Sujey Lujan  [phone]    This is a follow up note for this  67yMale with s/p transplant no interval ID events      ROS:  CONSTITUTIONAL:  No fever, good appetite  CARDIOVASCULAR:  No chest pain or palpitations  RESPIRATORY:  No dyspnea  GASTROINTESTINAL:  No nausea, vomiting, diarrhea, or abdominal pain  GENITOURINARY:  No dysuria  NEUROLOGIC:  No headache,     Allergies    No Known Allergies    Intolerances        ANTIBIOTICS/RELEVANT:  antimicrobials  atovaquone Suspension 1500 milliGRAM(s) Oral daily  nystatin    Suspension 988615 Unit(s) Oral every 6 hours  valGANciclovir 450 milliGRAM(s) Oral <User Schedule>    immunologic:  mycophenolate mofetil 1000 milliGRAM(s) Oral <User Schedule>  tacrolimus 9 milliGRAM(s) Oral <User Schedule>    OTHER:  acetaminophen   Tablet. 650 milliGRAM(s) Oral every 6 hours PRN  ALBUTerol/ipratropium for Nebulization 3 milliLiter(s) Nebulizer every 6 hours  aspirin enteric coated 81 milliGRAM(s) Oral daily  Calcium Citrate + Vit D, 315 mg/200 Unit 2 Tablet(s) 2 Tablet(s) Oral two times a day  diltiazem    milliGRAM(s) Oral daily  docusate sodium 100 milliGRAM(s) Oral three times a day  famotidine    Tablet 20 milliGRAM(s) Oral daily  heparin  Injectable 85948 Unit(s) SubCutaneous <User Schedule>  insulin lispro (HumaLOG) corrective regimen sliding scale   SubCutaneous three times a day before meals  insulin lispro (HumaLOG) corrective regimen sliding scale   SubCutaneous at bedtime  magnesium oxide 800 milliGRAM(s) Oral every 12 hours  multivitamin 1 Tablet(s) Oral daily  pravastatin 20 milliGRAM(s) Oral at bedtime  predniSONE   Tablet 15 milliGRAM(s) Oral <User Schedule>  silver sulfADIAZINE 1% Cream 1 Application(s) Topical two times a day  sodium bicarbonate 650 milliGRAM(s) Oral three times a day      Objective:  Vital Signs Last 24 Hrs  T(C): 36.4 (05 Apr 2018 15:36), Max: 36.6 (04 Apr 2018 19:54)  T(F): 97.6 (05 Apr 2018 15:36), Max: 97.9 (04 Apr 2018 19:54)  HR: 105 (05 Apr 2018 15:36) (95 - 105)  BP: 110/75 (05 Apr 2018 15:36) (98/64 - 116/85)  BP(mean): 86 (05 Apr 2018 15:36) (86 - 97)  RR: 18 (05 Apr 2018 15:36) (18 - 19)  SpO2: 98% (05 Apr 2018 15:36) (97% - 100%)    PHYSICAL EXAM:  Constitutional:no acute distress  Eyes:WALT, EOMI  Ear/Nose/Throat: no oral lesions, 	  Respiratory: clear BL, sternum tissue apposed  Cardiovascular: S1S2  Gastrointestinal:soft, (+) BS, no tenderness  Extremities:no e/e/c right middle finger with tip blackened discoloration  No Lymphadenopathy  IV sites not inflammed.    LABS:                        8.9    12.1  )-----------( 261      ( 05 Apr 2018 07:42 )             28.3     04-05    138  |  104  |  40<H>  ----------------------------<  110<H>  5.4<H>   |  20<L>  |  1.93<H>    Ca    8.6      05 Apr 2018 07:42  Mg     2.1     04-04    TPro  5.5<L>  /  Alb  3.1<L>  /  TBili  0.7  /  DBili  x   /  AST  12  /  ALT  23  /  AlkPhos  68  04-04    PT/INR - ( 05 Apr 2018 07:42 )   PT: 11.9 sec;   INR: 1.09 ratio         PTT - ( 05 Apr 2018 07:42 )  PTT:31.2 sec      MICROBIOLOGY:    Culture - Urine (03.30.18 @ 02:30)    Specimen Source: .Urine Clean Catch (Midstream)    Culture Results:   No growth            RECENT CULTURES:  03-30 @ 02:30  .Urine Clean Catch (Midstream)  --  --  --    No growth  --      RADIOLOGY & ADDITIONAL STUDIES:

## 2018-04-05 NOTE — CHART NOTE - NSCHARTNOTEFT_GEN_A_CORE
Source: Patient [ x ]    Family [ ]     other [ x ] RN, Comprehensive chart review, heart failure team    Pt seen for nutrition follow up. Reports good appetite and eating >80% of meals and 1-2 Ensure Enlive daily. Denies GI distress. Pt requested to defer further diet education at this time.     Chart reviewed- s/p heart transplant on 2/23, post-op course c/b primary graft dysfunction and received plasmapharesis/IVIg x2 with improvement in LV function, bilateral IJ/subclavian thrombi; with increased lymphocytes seen on the fourth biopsy, s/p fifth biopsy 3/28; NORMAN-improving, hyperkalemia likely due to increasing prograf levels-resolving. Pt missed five doses of prednisone. Right heart cath showed elevated biventricular filling pressures with normal cardiac output. Biopsy showed 1R/1A cellular rejection. Worsening renal function.     Diet : regular, low fiber, no concentrated potassium, Ensure Enlive 3 cans/day.      Admission Weight: 78.9kg  Current Weight: 74kg  Weight fluctuations noted, likely due to fluid shifts. Pt with 1+right arm edema.     Pertinent Medications: MEDICATIONS  (STANDING):  ALBUTerol/ipratropium for Nebulization 3 milliLiter(s) Nebulizer every 6 hours  aspirin enteric coated 81 milliGRAM(s) Oral daily  atovaquone Suspension 1500 milliGRAM(s) Oral daily  Calcium Citrate + Vit D, 315 mg/200 Unit 2 Tablet(s) 2 Tablet(s) Oral two times a day  diltiazem    milliGRAM(s) Oral daily  docusate sodium 100 milliGRAM(s) Oral three times a day  famotidine    Tablet 20 milliGRAM(s) Oral daily  heparin  Injectable 50688 Unit(s) SubCutaneous <User Schedule>  insulin lispro (HumaLOG) corrective regimen sliding scale   SubCutaneous three times a day before meals  insulin lispro (HumaLOG) corrective regimen sliding scale   SubCutaneous at bedtime  magnesium oxide 800 milliGRAM(s) Oral every 12 hours  multivitamin 1 Tablet(s) Oral daily  mycophenolate mofetil 1000 milliGRAM(s) Oral <User Schedule>  nystatin    Suspension 420878 Unit(s) Oral every 6 hours  pravastatin 20 milliGRAM(s) Oral at bedtime  predniSONE   Tablet 15 milliGRAM(s) Oral <User Schedule>  silver sulfADIAZINE 1% Cream 1 Application(s) Topical two times a day  sodium bicarbonate 650 milliGRAM(s) Oral three times a day  tacrolimus 10 milliGRAM(s) Oral <User Schedule>  valGANciclovir 450 milliGRAM(s) Oral <User Schedule>    MEDICATIONS  (PRN):  acetaminophen   Tablet. 650 milliGRAM(s) Oral every 6 hours PRN Mild Pain (1 - 3)    Pertinent Labs:    Complete Blood Count (04.05.18 @ 07:42)    Hemoglobin: 8.9 g/dL - low    Hematocrit: 28.3 % - low  Basic Metabolic Panel - STAT (04.05.18 @ 07:42)    Sodium, Serum: 138 mmol/L    Potassium, Serum: 5.4 mmol/L - high    Chloride, Serum: 104 mmol/L    Carbon Dioxide, Serum: 20 mmol/L - low    Anion Gap, Serum: 14 mmol/L    Blood Urea Nitrogen, Serum: 40 mg/dL - high    Creatinine, Serum: 1.93 mg/dL - high    Glucose, Serum: 110 mg/dL - high    Calcium, Total Serum: 8.6 mg/dL  Comprehensive Metabolic Panel (04.04.18 @ 10:38)    Protein Total, Serum: 5.5 g/dL - low    Albumin, Serum: 3.1 g/dL - low    Bilirubin Total, Serum: 0.7 mg/dL    Alkaline Phosphatase, Serum: 68 U/L    Aspartate Aminotransferase (AST/SGOT): 12 U/L    Alanine Aminotransferase (ALT/SGPT): 23 U/L RC    Point of Care Testing BG:(4/5)111,133, (4/4).    Skin: No pressure ulcers noted.    Estimated Needs:   [ x ] no change since previous assessment  [ ] recalculated:       Previous Nutrition Diagnosis:   Inadequate Oral Intake and Increased Nutrient Needs improving with PO diet and supplementation.  Food & Nutrition Related Knowledge Deficit improving.         New Nutrition Diagnosis: [ x ] not applicable      Interventions:   1. Recommend change diet to regular, low fiber, no concentrated potassium, low sodium.   2. Recommend change Ensure Enlive 3 cans/day to Nepro 1 can/day.  3. Encourage PO intake with all meals.  4. Provide food preferences within therapeutic diet when requested.   5. Reviewed relationship of diet to health/disease.     D/w NP.       Monitoring and Evaluation:     [ x ] PO intake [ x ] Tolerance to diet prescription [ x ] weights [ x ] follow up per protocol    [ ] other:

## 2018-04-05 NOTE — PROGRESS NOTE ADULT - PROBLEM SELECTOR PLAN 2
Continue prednisone at 15 mg PO BID.   Continue CellCept 1000 mg BID  Given supratherapeutic levels of tacrolimus, we will hold tonight's dose and resume tomorrow at 9 mg PO BID. We will decrease the diltiazem to 240 mg daily.    We will continue on aspirin and Pravachol.

## 2018-04-05 NOTE — PROGRESS NOTE ADULT - PROBLEM SELECTOR PLAN 5
renal following  improving kidney function insulin sliding scale for high dose steroids following cardiac biopsy

## 2018-04-05 NOTE — PROGRESS NOTE ADULT - ASSESSMENT
67 year old man with ACC/AHA stage D chronic systolic heart failure due to NICM s/p HM2 LVAD 6/17 c/b pump thrombosis now s/p heart transplant on 2/23 (CMV D-/R+ and Toxo D-/R+), with post-op course c/b primary graft dysfunction, initially thought to be immune-mediated due to positive B-cell flow (negative CDC cross match) and received plasmapharesis/IVIg x2 with improvement in LV function. His course has been complicated by bilateral IJ/subclavian thrombi. He currently has acute renal failure of unclear etiology. However, he does not have clear evidence of allograft dysfunction or low output on exam. 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     # Immunosuppression:  Tacrolimus -  Will continue dosing at 9 mg BID (increased yesterday). Tacro level 7 this morning from 9.4 Target trough level of 12-14. - - cardizem  240 qd  CellCept - continue 1000 mg PO BID.   Steroids - will give short pulse of oral steroids. Will give 50 mg PO BID for three days with subsequent taper.  Taper starting 3/24 45 mg x2 the 40 x2  decrease by 10  until 15 mg bid- ordered    # Antimicrobial prophylaxis:  PCP - continue Mepron 1500mg PO daily      # Bilateral IJ/Subclavian thrombi - unchanged on f/u U/S 3/12  -   - CHERIE negative    #Acute renal failure, possibly related to vancomycin dosing, improving.   - Vanco stopped.   - No further diuresis.     # CAV PPx  - Continue ECASA 81mg PO daily  - Continue pravastatin 20mg PO QHS    # ID   - Pseudomonas in sputum - course of cefepime completed 3/15  - DLES improved and vancomycin stopped given high trough with NORMAN. Per ID, will redose with daptomycin depending upon repeat vanc level this morning.   - silver sulfadizaine dressings per plastics for R 3rd digit.   - HCV viral load and PCR per protocol. Therapy to be initiated as outpatient by Dr. Thomas (Hepatology)  - Transplant ID following      3/26 Pt for repeat UE thrombus as per HF, r/o propagation  Creat is improving may need medications adjustment  Ambulate  3/27 Biopsy now scheduled for Wednesday. April 4    UE duplex with new innominate vein partial thrombus, cont anticoagulation, now therapeutic. D/w Dr Tavarez     3/28 prograf increased to 9mg bid.  Renal function continues to improve  Will resume hep sq tonight after biopsy this am  3/29 - Prograf 0.5mg po x 1 stat & increase Prograf to 9.5mg po bid  increase CardizemCD to 300mg po daily - received CardizemCD 240 this am therefore Cardizem 60mg po x 1 given this am  CXR w/ RLL haziness - will ultrasound  R LL today  36/30 Hyperkalemia - kayexalate 30 x1  Na bicarb added  Creat improved, now on lovenox  3/31   feeling well today,  VSS, TAC level 10.0, education by transplant coordinator with family, potassium stable  5.0,   cardizem to 360 Qd  4/1    VSS   5 beats wct in NSR,    LAVD site with VAC,   WBC   16.   afebrile,   TAC level   10.1  4/2 Planning on d/c home Tuesday, vac reordered.  Tacro lvl 12 on 10 bid .  Plastics f/u ,  cont silvadene , f/u outpatient.;  4/3 Pt for d/c home today 67 year old man with ACC/AHA stage D chronic systolic heart failure due to NICM s/p HM2 LVAD 6/17 c/b pump thrombosis now s/p heart transplant on 2/23 (CMV D-/R+ and Toxo D-/R+), with post-op course c/b primary graft dysfunction, initially thought to be immune-mediated due to positive B-cell flow (negative CDC cross match) and received plasmapharesis/IVIg x2 with improvement in LV function. His course has been complicated by bilateral IJ/subclavian thrombi. He currently has acute renal failure of unclear etiology. However, he does not have clear evidence of allograft dysfunction or low output on exam. 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     # Immunosuppression:  Tacrolimus -  Will continue dosing at 9 mg BID (increased yesterday). Tacro level 7 this morning from 9.4 Target trough level of 12-14. - - cardizem  240 qd  CellCept - continue 1000 mg PO BID.   Steroids - will give short pulse of oral steroids. Will give 50 mg PO BID for three days with subsequent taper.  Taper starting 3/24 45 mg x2 the 40 x2  decrease by 10  until 15 mg bid- ordered    # Antimicrobial prophylaxis:  PCP - continue Mepron 1500mg PO daily      # Bilateral IJ/Subclavian thrombi - unchanged on f/u U/S 3/12  -   - CHERIE negative    #Acute renal failure, possibly related to vancomycin dosing, improving.   - Vanco stopped.   - No further diuresis.     # CAV PPx  - Continue ECASA 81mg PO daily  - Continue pravastatin 20mg PO QHS    # ID   - Pseudomonas in sputum - course of cefepime completed 3/15  - DLES improved and vancomycin stopped given high trough with NORMAN. Per ID, will redose with daptomycin depending upon repeat vanc level this morning.   - silver sulfadizaine dressings per plastics for R 3rd digit.   - HCV viral load and PCR per protocol. Therapy to be initiated as outpatient by Dr. Thomas (Hepatology)  - Transplant ID following      3/26 Pt for repeat UE thrombus as per HF, r/o propagation  Creat is improving may need medications adjustment  Ambulate  3/27 Biopsy now scheduled for Wednesday. April 4    UE duplex with new innominate vein partial thrombus, cont anticoagulation, now therapeutic. D/w Dr Tavarez     3/28 prograf increased to 9mg bid.  Renal function continues to improve  Will resume hep sq tonight after biopsy this am  3/29 - Prograf 0.5mg po x 1 stat & increase Prograf to 9.5mg po bid  increase CardizemCD to 300mg po daily - received CardizemCD 240 this am therefore Cardizem 60mg po x 1 given this am  CXR w/ RLL haziness - will ultrasound  R LL today  36/30 Hyperkalemia - kayexalate 30 x1  Na bicarb added  Creat improved, now on lovenox  3/31   feeling well today,  VSS, TAC level 10.0, education by transplant coordinator with family, potassium stable  5.0,   cardizem to 360 Qd  4/1    VSS   5 beats wct in NSR,    LAVD site with VAC,   WBC   16.   afebrile,   TAC level   10.1  4/2 Planning on d/c home Tuesday, vac reordered.  Tacro lvl 12 on 10 bid .  Plastics f/u ,  cont silvadene , f/u outpatient.;  4/5 - Rounds made with Dr. Stahl:  Plan:   1. tacro level - 22.4 - will hold prograf this evening - & decrease dose to 9mg po bid starting 4/6  2. IV lasix 40mg x 1   3.  - with increase tac level - will decrease Cardizem CD to 240mg po daily  4. obtain daily Mg. levels  5. rpt Pt/Ptt @ 4am & tap Right pleural effusion @ 5pm d/t increasing size of right pleural effusion  6. d/c planning

## 2018-04-05 NOTE — PROGRESS NOTE ADULT - PROBLEM SELECTOR PLAN 2
wrap RUE w/ ace wrap,  inominate vein dvt U/S - right chest - reveals increasing right pleural effusion  will rpt. pt/ptt d/t hi dose of sq heparin  & insert pigtail later today

## 2018-04-05 NOTE — PROGRESS NOTE ADULT - SUBJECTIVE AND OBJECTIVE BOX
VASCULAR MEDICINE                         CC:  UEDVT    INTERVAL HISTORY:     No CP or SOB. R arm without symptoms.  Creatinine up.  S/P Biopsy yesterday      MEDICATIONS  (STANDING):  ALBUTerol/ipratropium for Nebulization 3 milliLiter(s) Nebulizer every 6 hours  aspirin enteric coated 81 milliGRAM(s) Oral daily  atovaquone Suspension 1500 milliGRAM(s) Oral daily  Calcium Citrate + Vit D, 315 mg/200 Unit 2 Tablet(s) 2 Tablet(s) Oral two times a day  diltiazem    milliGRAM(s) Oral daily  docusate sodium 100 milliGRAM(s) Oral three times a day  famotidine    Tablet 20 milliGRAM(s) Oral daily  heparin  Injectable 50094 Unit(s) SubCutaneous <User Schedule>  insulin lispro (HumaLOG) corrective regimen sliding scale   SubCutaneous three times a day before meals  insulin lispro (HumaLOG) corrective regimen sliding scale   SubCutaneous at bedtime  magnesium oxide 800 milliGRAM(s) Oral every 12 hours  multivitamin 1 Tablet(s) Oral daily  mycophenolate mofetil 1000 milliGRAM(s) Oral <User Schedule>  nystatin    Suspension 268891 Unit(s) Oral every 6 hours  pravastatin 20 milliGRAM(s) Oral at bedtime  predniSONE   Tablet 15 milliGRAM(s) Oral <User Schedule>  silver sulfADIAZINE 1% Cream 1 Application(s) Topical two times a day  sodium bicarbonate 650 milliGRAM(s) Oral three times a day  tacrolimus 10 milliGRAM(s) Oral <User Schedule>  valGANciclovir 450 milliGRAM(s) Oral <User Schedule>    MEDICATIONS  (PRN):  acetaminophen   Tablet. 650 milliGRAM(s) Oral every 6 hours PRN Mild Pain (1 - 3)        REVIEW OF SYSTEMS:  CONSTITUTIONAL: No fever, weight loss, or fatigue  EYES: No eye pain, visual disturbances, or discharge  ENMT:  No difficulty hearing, tinnitus, vertigo; No sinus or throat pain  NECK: No pain or stiffness  RESPIRATORY: No cough, wheezing, chills or hemoptysis; No Shortness of Breath  CARDIOVASCULAR: No chest pain, palpitations, passing out, dizziness, or leg swelling  GASTROINTESTINAL: No abdominal or epigastric pain. No nausea, vomiting, or hematemesis; No diarrhea or constipation. No melena or hematochezia.  GENITOURINARY: No dysuria, frequency, hematuria, or incontinence  NEUROLOGICAL: No headaches, memory loss, loss of strength, numbness, or tremors  SKIN: No itching, burning, rashes, or lesions   LYMPH Nodes: No enlarged glands  ENDOCRINE: No heat or cold intolerance; No hair loss  MUSCULOSKELETAL: No joint pain or swelling; No muscle, back, or extremity pain  PSYCHIATRIC: No depression, anxiety, mood swings, or difficulty sleeping  HEME/LYMPH: No easy bruising, or bleeding gums  ALLERY AND IMMUNOLOGIC: No hives or eczema	    [x ] All others negative	  [ ] Unable to obtain    ICU Vital Signs Last 24 Hrs  T(C): 36.6 (05 Apr 2018 07:17), Max: 36.8 (04 Apr 2018 10:30)  T(F): 97.9 (05 Apr 2018 07:17), Max: 98.2 (04 Apr 2018 10:30)  HR: 104 (05 Apr 2018 07:17) (94 - 105)  BP: 110/74 (05 Apr 2018 07:17) (98/64 - 116/85)  BP(mean): 97 (04 Apr 2018 19:54) (85 - 97)  ABP: --  ABP(mean): --  RR: 18 (05 Apr 2018 07:17) (18 - 19)  SpO2: 100% (05 Apr 2018 07:17) (97% - 100%)      Appearance: Normal	  HEENT:   Normal oral mucosa, PERRL, EOMI	  Lymphatic: No lymphadenopathy  Cardiovascular: Normal S1 S2   Respiratory: Lungs clear to auscultation	  Psychiatry: A & O x 3, Mood & affect appropriate  Gastrointestinal:  Soft, Non-tender, + BS	  Skin: No rashes, No ecchymoses, No cyanosis	  Neurologic: Non-focal  Extremities:  Very mild Right upper extremity bicep area fullness and edema.  Forearm without edema.  Strong Right radial pulse.  The 3rd digit is has distal eschar.  The dorsal aspect is larger and has a small break in the skin.         	                      8.9    12.1  )-----------( 261      ( 05 Apr 2018 07:42 )             28.3   04-05    138  |  104  |  40<H>  ----------------------------<  110<H>  5.4<H>   |  20<L>  |  1.93<H>    Ca    8.6      05 Apr 2018 07:42  Mg     2.1     04-04    TPro  5.5<L>  /  Alb  3.1<L>  /  TBili  0.7  /  DBili  x   /  AST  12  /  ALT  23  /  AlkPhos  68  04-04

## 2018-04-05 NOTE — PROGRESS NOTE ADULT - ASSESSMENT
67 year old man with ACC/AHA stage D chronic systolic heart failure due to NICM (LVEF 20%) s/p HM2 LVAD 6/17 c/b pump thrombus now s/p OHT 2/23 (CMV D-/R+ and Toxo D-/R+), with post-op course c/b primary graft dysfunction, initially thought to be immune-mediated due to positive B-cell flow (negative CDC cross match) and received plasmapheresis/IVIG x2 with improvement in LV function. Found to have b/l IJ/subclavian thrombi as well. CrCl somewhat decreased from baseline pre-tx.    1) Antimicrobial prophylaxis:  Intermediate risk CMV - Valcyte 450mg adjusted to tiw based upon rising creatinine  Intermediate risk Toxo/PCP - continue Mepron 1500mg PO daily with food.  Thrush - continue Nystatin S/S 5 mL Qid  HCV+ donor/HCV- recipient; HCV (1a) - 3/27 HCV PCR 32,891 (To be treated outpatient, monitoring weekly)  will repeat HCv VL for tomorrow  will need a repeat specimen either later this week or at his first clinic visit- will order    2) Leukocytosis improved        Gary Lujan MD  [phone]  After 5pm/weekends [phone]

## 2018-04-05 NOTE — PROGRESS NOTE ADULT - ASSESSMENT
Assessment:  1.  Bilateral, extensive upper extremity DVT  - Currently on lovenox  - Swelling of RUE controlled with elevation and ace wrap  2.  Heart Transplant  3.  Acute renal failure  4.  Right 3rf digit ischemia - stable  5.  Right foot bunion      Plan  1.  Continue with full dose SubQ heparin at current dose.   2.  After the 3rd dose of heparin, would check PTT 6 hours after administration  3.  Will follow with you - would keep goal PTT around 50-60 to minimize bleeding risk  4.  Repeat upper extremity duplex next week      Thanks      [place] [id number]

## 2018-04-05 NOTE — PROGRESS NOTE ADULT - PROBLEM SELECTOR PLAN 6
all cultures negative  ck cbc daily renal following  improving kidney function  IV lasix 40mg x 1 per Dr. Stahl

## 2018-04-05 NOTE — PROGRESS NOTE ADULT - PROBLEM SELECTOR PLAN 1
We will continue current steroid dose at prednisone 15 mg PO BID.   DSA was negative. We will consider the possibility of non-HLA antibodies leading to the persistent C4D positivity given persistent allograft dysfunction.  We will give lasix IV 40 mg x 1.

## 2018-04-06 LAB
ANION GAP SERPL CALC-SCNC: 15 MMOL/L — SIGNIFICANT CHANGE UP (ref 5–17)
APTT BLD: 27.3 SEC — LOW (ref 27.5–37.4)
APTT BLD: 30.5 SEC — SIGNIFICANT CHANGE UP (ref 27.5–37.4)
APTT BLD: 30.8 SEC — SIGNIFICANT CHANGE UP (ref 27.5–37.4)
BUN SERPL-MCNC: 45 MG/DL — HIGH (ref 7–23)
CALCIUM SERPL-MCNC: 8.8 MG/DL — SIGNIFICANT CHANGE UP (ref 8.4–10.5)
CHLORIDE SERPL-SCNC: 103 MMOL/L — SIGNIFICANT CHANGE UP (ref 96–108)
CO2 SERPL-SCNC: 23 MMOL/L — SIGNIFICANT CHANGE UP (ref 22–31)
CREAT SERPL-MCNC: 2.08 MG/DL — HIGH (ref 0.5–1.3)
GLUCOSE BLDC GLUCOMTR-MCNC: 122 MG/DL — HIGH (ref 70–99)
GLUCOSE BLDC GLUCOMTR-MCNC: 125 MG/DL — HIGH (ref 70–99)
GLUCOSE BLDC GLUCOMTR-MCNC: 138 MG/DL — HIGH (ref 70–99)
GLUCOSE BLDC GLUCOMTR-MCNC: 142 MG/DL — HIGH (ref 70–99)
GLUCOSE SERPL-MCNC: 132 MG/DL — HIGH (ref 70–99)
HCT VFR BLD CALC: 30 % — LOW (ref 39–50)
HGB BLD-MCNC: 9.5 G/DL — LOW (ref 13–17)
INR BLD: 1.07 RATIO — SIGNIFICANT CHANGE UP (ref 0.88–1.16)
MAGNESIUM SERPL-MCNC: 2.5 MG/DL — SIGNIFICANT CHANGE UP (ref 1.6–2.6)
MCHC RBC-ENTMCNC: 29.4 PG — SIGNIFICANT CHANGE UP (ref 27–34)
MCHC RBC-ENTMCNC: 31.6 GM/DL — LOW (ref 32–36)
MCV RBC AUTO: 93.2 FL — SIGNIFICANT CHANGE UP (ref 80–100)
PLATELET # BLD AUTO: 295 K/UL — SIGNIFICANT CHANGE UP (ref 150–400)
POTASSIUM SERPL-MCNC: 5.3 MMOL/L — SIGNIFICANT CHANGE UP (ref 3.5–5.3)
POTASSIUM SERPL-SCNC: 5.3 MMOL/L — SIGNIFICANT CHANGE UP (ref 3.5–5.3)
PROTHROM AB SERPL-ACNC: 11.6 SEC — SIGNIFICANT CHANGE UP (ref 9.8–12.7)
RBC # BLD: 3.22 M/UL — LOW (ref 4.2–5.8)
RBC # FLD: 18.8 % — HIGH (ref 10.3–14.5)
SODIUM SERPL-SCNC: 141 MMOL/L — SIGNIFICANT CHANGE UP (ref 135–145)
TACROLIMUS SERPL-MCNC: 14.4 NG/ML — SIGNIFICANT CHANGE UP
WBC # BLD: 13.5 K/UL — HIGH (ref 3.8–10.5)
WBC # FLD AUTO: 13.5 K/UL — HIGH (ref 3.8–10.5)

## 2018-04-06 PROCEDURE — 71045 X-RAY EXAM CHEST 1 VIEW: CPT | Mod: 26

## 2018-04-06 PROCEDURE — 99233 SBSQ HOSP IP/OBS HIGH 50: CPT

## 2018-04-06 PROCEDURE — 99232 SBSQ HOSP IP/OBS MODERATE 35: CPT

## 2018-04-06 RX ORDER — TACROLIMUS 5 MG/1
9 CAPSULE ORAL
Qty: 0 | Refills: 0 | Status: DISCONTINUED | OUTPATIENT
Start: 2018-04-07 | End: 2018-04-09

## 2018-04-06 RX ORDER — MAGNESIUM OXIDE 400 MG ORAL TABLET 241.3 MG
1200 TABLET ORAL EVERY 12 HOURS
Qty: 0 | Refills: 0 | Status: DISCONTINUED | OUTPATIENT
Start: 2018-04-06 | End: 2018-04-06

## 2018-04-06 RX ORDER — TACROLIMUS 5 MG/1
12 CAPSULE ORAL ONCE
Qty: 0 | Refills: 0 | Status: DISCONTINUED | OUTPATIENT
Start: 2018-04-06 | End: 2018-04-06

## 2018-04-06 RX ORDER — MAGNESIUM OXIDE 400 MG ORAL TABLET 241.3 MG
1200 TABLET ORAL
Qty: 0 | Refills: 0 | Status: DISCONTINUED | OUTPATIENT
Start: 2018-04-06 | End: 2018-04-07

## 2018-04-06 RX ORDER — TACROLIMUS 5 MG/1
12 CAPSULE ORAL
Qty: 0 | Refills: 0 | Status: COMPLETED | OUTPATIENT
Start: 2018-04-06 | End: 2018-04-06

## 2018-04-06 RX ORDER — TACROLIMUS 5 MG/1
5 CAPSULE ORAL ONCE
Qty: 0 | Refills: 0 | Status: COMPLETED | OUTPATIENT
Start: 2018-04-06 | End: 2018-04-06

## 2018-04-06 RX ADMIN — Medication 3 MILLILITER(S): at 12:10

## 2018-04-06 RX ADMIN — HEPARIN SODIUM 14000 UNIT(S): 5000 INJECTION INTRAVENOUS; SUBCUTANEOUS at 22:12

## 2018-04-06 RX ADMIN — FAMOTIDINE 20 MILLIGRAM(S): 10 INJECTION INTRAVENOUS at 12:13

## 2018-04-06 RX ADMIN — Medication 1 APPLICATION(S): at 06:21

## 2018-04-06 RX ADMIN — Medication 650 MILLIGRAM(S): at 12:12

## 2018-04-06 RX ADMIN — Medication 1 TABLET(S): at 12:10

## 2018-04-06 RX ADMIN — TACROLIMUS 12 MILLIGRAM(S): 5 CAPSULE ORAL at 20:08

## 2018-04-06 RX ADMIN — Medication 3 MILLILITER(S): at 06:21

## 2018-04-06 RX ADMIN — Medication 81 MILLIGRAM(S): at 12:10

## 2018-04-06 RX ADMIN — Medication 650 MILLIGRAM(S): at 06:20

## 2018-04-06 RX ADMIN — Medication 15 MILLIGRAM(S): at 08:06

## 2018-04-06 RX ADMIN — TACROLIMUS 5 MILLIGRAM(S): 5 CAPSULE ORAL at 10:08

## 2018-04-06 RX ADMIN — Medication 15 MILLIGRAM(S): at 20:06

## 2018-04-06 RX ADMIN — Medication 500000 UNIT(S): at 06:21

## 2018-04-06 RX ADMIN — Medication 650 MILLIGRAM(S): at 22:12

## 2018-04-06 RX ADMIN — MAGNESIUM OXIDE 400 MG ORAL TABLET 800 MILLIGRAM(S): 241.3 TABLET ORAL at 06:20

## 2018-04-06 RX ADMIN — MYCOPHENOLATE MOFETIL 1000 MILLIGRAM(S): 250 CAPSULE ORAL at 20:06

## 2018-04-06 RX ADMIN — MYCOPHENOLATE MOFETIL 1000 MILLIGRAM(S): 250 CAPSULE ORAL at 08:07

## 2018-04-06 RX ADMIN — VALGANCICLOVIR 450 MILLIGRAM(S): 450 TABLET, FILM COATED ORAL at 08:08

## 2018-04-06 RX ADMIN — Medication 1 APPLICATION(S): at 18:49

## 2018-04-06 RX ADMIN — Medication 500000 UNIT(S): at 12:10

## 2018-04-06 RX ADMIN — ATOVAQUONE 1500 MILLIGRAM(S): 750 SUSPENSION ORAL at 12:11

## 2018-04-06 RX ADMIN — Medication 20 MILLIGRAM(S): at 22:12

## 2018-04-06 RX ADMIN — MAGNESIUM OXIDE 400 MG ORAL TABLET 1200 MILLIGRAM(S): 241.3 TABLET ORAL at 20:07

## 2018-04-06 RX ADMIN — Medication 100 MILLIGRAM(S): at 12:11

## 2018-04-06 RX ADMIN — Medication 240 MILLIGRAM(S): at 06:21

## 2018-04-06 RX ADMIN — Medication 500000 UNIT(S): at 18:49

## 2018-04-06 RX ADMIN — Medication 3 MILLILITER(S): at 18:49

## 2018-04-06 NOTE — CHART NOTE - NSCHARTNOTEFT_GEN_A_CORE
Source: Patient [ x ]    Family [ ]     other [ x ] RN, Comprehensive chart review     Pt seen for nutrition follow up. Reports good appetite and eating 100% of meals. Denies GI distress with 2 BMs passed yesterday. Pt continues to refuse diet education, amenable to review at next follow up. However, able to teach-back food/drug interaction.     Chart reviewed- s/p heart transplant on 2/23, post-op course c/b primary graft dysfunction and received plasmapharesis/IVIg x2 with improvement in LV function, bilateral IJ/subclavian thrombi; with increased lymphocytes seen on the fourth biopsy, s/p fifth biopsy 3/28; NORMAN-improving, hyperkalemia likely due to increasing prograf levels-resolving. Pt missed five doses of prednisone. Right heart cath showed elevated biventricular filling pressures with normal cardiac output. Biopsy showed 1R/1A cellular rejection. Worsening renal function.     Diet : regular, low fiber, no concentrated potassium, Ensure Enlive 3 cans/day.      Admission Weight: 78.9kg  Current Weight: 75.6kg  Weight fluctuations noted, likely due to fluid shifts. No edema or pressure ulcers noted.     Pertinent Medications: MEDICATIONS  (STANDING):  ALBUTerol/ipratropium for Nebulization 3 milliLiter(s) Nebulizer every 6 hours  aspirin enteric coated 81 milliGRAM(s) Oral daily  atovaquone Suspension 1500 milliGRAM(s) Oral daily  Calcium Citrate + Vit D, 315 mg/200 Unit 2 Tablet(s) 2 Tablet(s) Oral two times a day  diltiazem    milliGRAM(s) Oral daily  docusate sodium 100 milliGRAM(s) Oral three times a day  famotidine    Tablet 20 milliGRAM(s) Oral daily  heparin  Injectable 10567 Unit(s) SubCutaneous <User Schedule>  insulin lispro (HumaLOG) corrective regimen sliding scale   SubCutaneous three times a day before meals  insulin lispro (HumaLOG) corrective regimen sliding scale   SubCutaneous at bedtime  lidocaine 2% Injectable 20 milliLiter(s) Local Injection once  magnesium oxide 800 milliGRAM(s) Oral every 12 hours  multivitamin 1 Tablet(s) Oral daily  mycophenolate mofetil 1000 milliGRAM(s) Oral <User Schedule>  nystatin    Suspension 354590 Unit(s) Oral every 6 hours  pravastatin 20 milliGRAM(s) Oral at bedtime  predniSONE   Tablet 15 milliGRAM(s) Oral <User Schedule>  silver sulfADIAZINE 1% Cream 1 Application(s) Topical two times a day  sodium bicarbonate 650 milliGRAM(s) Oral three times a day  valGANciclovir 450 milliGRAM(s) Oral <User Schedule>    MEDICATIONS  (PRN):  acetaminophen   Tablet. 650 milliGRAM(s) Oral every 6 hours PRN Mild Pain (1 - 3)    Pertinent Labs:    Complete Blood Count (04.06.18 @ 07:31)    Hemoglobin: 9.5 g/dL - low    Hematocrit: 30.0 % - low  Basic Metabolic Panel (04.06.18 @ 07:31)    Sodium, Serum: 141 mmol/L    Potassium, Serum: 5.3 mmol/L    Chloride, Serum: 103 mmol/L    Carbon Dioxide, Serum: 23 mmol/L    Anion Gap, Serum: 15 mmol/L    Blood Urea Nitrogen, Serum: 45 mg/dL - high    Creatinine, Serum: 2.08 mg/dL - high    Glucose, Serum: 132 mg/dL - high    Calcium, Total Serum: 8.8 mg/dL    Point of Care Testing BG: (4/6)125, (4/5)111-199.    Skin: no pressure ulcers noted.     Estimated Needs:   [ x ] no change since previous assessment  [ ] recalculated:       Previous Nutrition Diagnosis:   Inadequate Oral Intake and Increased Nutrient Needs improving with PO diet and supplementation.  Food & Nutrition Related Knowledge Deficit improving.       New Nutrition Diagnosis: [ x ] not applicable    Interventions:   1. Recommend change diet to regula, low sodium, no concentrated potassium, low fiber with Nepro 1 can/day.   2. Encourage pt to maintain good PO intake with all meals.  3. Provide food preferences within therapeutic diet when requested.   4. Reviewed food/drug interaction.  5. RD to follow up with further review of nutrition guidelines for after transplant when pt is amenable.     D/w NP.      Monitoring and Evaluation:     [ x ] PO intake [ x ] Tolerance to diet prescription [ x ] weights [ x ] follow up per protocol    [ ] other:

## 2018-04-06 NOTE — PROGRESS NOTE ADULT - ASSESSMENT
67 year old man with ACC/AHA stage D chronic systolic heart failure due to NICM s/p HM2 LVAD 6/17 c/b pump thrombosis now s/p heart transplant on 2/23 (CMV D-/R+ and Toxo D-/R+), with post-op course c/b primary graft dysfunction, initially thought to be immune-mediated due to positive B-cell flow (negative CDC cross match) and received plasmapharesis/IVIg x2 with improvement in LV function. His course has been complicated by bilateral IJ/subclavian thrombi. He currently has acute renal failure of unclear etiology. However, he does not have clear evidence of allograft dysfunction or low output on exam. 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     # Immunosuppression:  Tacrolimus -  Will continue dosing at 9 mg BID (increased yesterday). Tacro level 7 this morning from 9.4 Target trough level of 12-14. - - cardizem  240 qd  CellCept - continue 1000 mg PO BID.   Steroids - will give short pulse of oral steroids. Will give 50 mg PO BID for three days with subsequent taper.  Taper starting 3/24 45 mg x2 the 40 x2  decrease by 10  until 15 mg bid- ordered    # Antimicrobial prophylaxis:  PCP - continue Mepron 1500mg PO daily      # Bilateral IJ/Subclavian thrombi - unchanged on f/u U/S 3/12  -   - CHERIE negative    #Acute renal failure, possibly related to vancomycin dosing, improving.   - Vanco stopped.   - No further diuresis.     # CAV PPx  - Continue ECASA 81mg PO daily  - Continue pravastatin 20mg PO QHS    # ID   - Pseudomonas in sputum - course of cefepime completed 3/15  - DLES improved and vancomycin stopped given high trough with NORMAN. Per ID, will redose with daptomycin depending upon repeat vanc level this morning.   - silver sulfadizaine dressings per plastics for R 3rd digit.   - HCV viral load and PCR per protocol. Therapy to be initiated as outpatient by Dr. Thomas (Hepatology)  - Transplant ID following      3/26 Pt for repeat UE thrombus as per HF, r/o propagation  Creat is improving may need medications adjustment  Ambulate  3/27 Biopsy now scheduled for Wednesday. April 4    UE duplex with new innominate vein partial thrombus, cont anticoagulation, now therapeutic. D/w Dr Tavarez     3/28 prograf increased to 9mg bid.  Renal function continues to improve  Will resume hep sq tonight after biopsy this am  3/29 - Prograf 0.5mg po x 1 stat & increase Prograf to 9.5mg po bid  increase CardizemCD to 300mg po daily - received CardizemCD 240 this am therefore Cardizem 60mg po x 1 given this am  CXR w/ RLL haziness - will ultrasound  R LL today  36/30 Hyperkalemia - kayexalate 30 x1  Na bicarb added  Creat improved, now on lovenox  3/31   feeling well today,  VSS, TAC level 10.0, education by transplant coordinator with family, potassium stable  5.0,   cardizem to 360 Qd  4/1    VSS   5 beats wct in NSR,    LAVD site with VAC,   WBC   16.   afebrile,   TAC level   10.1  4/2 Planning on d/c home Tuesday, vac reordered.  Tacro lvl 12 on 10 bid .  Plastics f/u ,  cont silvadene , f/u outpatient.;  4/5 - Rounds made with Dr. Stahl:   tacro level - 22.4 - will hold prograf this evening - & decrease dose to 9mg po bid starting 4/6 IV lasix 40mg x 1;  - with increase tac level - will decrease Cardizem CD to 240mg po daily  4/6 prograf level: 14.4 this am- d/w Dr. Stahl- prograf 5 mg given this am; VSS;  rt pleural eff- sono site; minimal fluid as per CTU Pa/NP;   Discharge planning - Home next week 67 year old man with ACC/AHA stage D chronic systolic heart failure due to NICM s/p HM2 LVAD 6/17 c/b pump thrombosis now s/p heart transplant on 2/23 (CMV D-/R+ and Toxo D-/R+), with post-op course c/b primary graft dysfunction, initially thought to be immune-mediated due to positive B-cell flow (negative CDC cross match) and received plasmapharesis/IVIg x2 with improvement in LV function. His course has been complicated by bilateral IJ/subclavian thrombi. He currently has acute renal failure of unclear etiology. However, he does not have clear evidence of allograft dysfunction or low output on exam. 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     # Immunosuppression:  Tacrolimus -  Will continue dosing at 9 mg BID (increased yesterday). Tacro level 7 this morning from 9.4 Target trough level of 12-14. - - cardizem  240 qd  CellCept - continue 1000 mg PO BID.   Steroids - will give short pulse of oral steroids. Will give 50 mg PO BID for three days with subsequent taper.  Taper starting 3/24 45 mg x2 the 40 x2  decrease by 10  until 15 mg bid- ordered    # Antimicrobial prophylaxis:  PCP - continue Mepron 1500mg PO daily      # Bilateral IJ/Subclavian thrombi - unchanged on f/u U/S 3/12  -   - CHERIE negative    #Acute renal failure, possibly related to vancomycin dosing, improving.   - Vanco stopped.   - No further diuresis.     # CAV PPx  - Continue ECASA 81mg PO daily  - Continue pravastatin 20mg PO QHS    # ID   - Pseudomonas in sputum - course of cefepime completed 3/15  - DLES improved and vancomycin stopped given high trough with NORMAN. Per ID, will redose with daptomycin depending upon repeat vanc level this morning.   - silver sulfadizaine dressings per plastics for R 3rd digit.   - HCV viral load and PCR per protocol. Therapy to be initiated as outpatient by Dr. Thomas (Hepatology)  - Transplant ID following      3/26 Pt for repeat UE thrombus as per HF, r/o propagation  Creat is improving may need medications adjustment  Ambulate  3/27 Biopsy now scheduled for Wednesday. April 4    UE duplex with new innominate vein partial thrombus, cont anticoagulation, now therapeutic. D/w Dr Tavarez     3/28 prograf increased to 9mg bid.  Renal function continues to improve  Will resume hep sq tonight after biopsy this am  3/29 - Prograf 0.5mg po x 1 stat & increase Prograf to 9.5mg po bid  increase CardizemCD to 300mg po daily - received CardizemCD 240 this am therefore Cardizem 60mg po x 1 given this am  CXR w/ RLL haziness - will ultrasound  R LL today  36/30 Hyperkalemia - kayexalate 30 x1  Na bicarb added  Creat improved, now on lovenox  3/31   feeling well today,  VSS, TAC level 10.0, education by transplant coordinator with family, potassium stable  5.0,   cardizem to 360 Qd  4/1    VSS   5 beats wct in NSR,    LAVD site with VAC,   WBC   16.   afebrile,   TAC level   10.1  4/2 Planning on d/c home Tuesday, vac reordered.  Tacro lvl 12 on 10 bid .  Plastics f/u ,  cont silvadene , f/u outpatient.;  4/5 - Rounds made with Dr. Stahl:   tacro level - 22.4 - will hold prograf this evening - & decrease dose to 9mg po bid starting 4/6 IV lasix 40mg x 1;  - with increase tac level - will decrease Cardizem CD to 240mg po daily  4/6 prograf level: 14.4 this am- d/w Dr. Stahl- prograf 5 mg given this am then 12 mg this evening; prograf 9 bid to start in am sat 4/7; VSS;  rt pleural eff- sono site; minimal fluid as per CTU Pa/NP;   Discharge planning - Home next week 67 year old man with ACC/AHA stage D chronic systolic heart failure due to NICM s/p HM2 LVAD 6/17 c/b pump thrombosis now s/p heart transplant on 2/23 (CMV D-/R+ and Toxo D-/R+), with post-op course c/b primary graft dysfunction, initially thought to be immune-mediated due to positive B-cell flow (negative CDC cross match) and received plasmapharesis/IVIg x2 with improvement in LV function. His course has been complicated by bilateral IJ/subclavian thrombi. He currently has acute renal failure of unclear etiology. However, he does not have clear evidence of allograft dysfunction or low output on exam. 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     # Immunosuppression:  Tacrolimus -  Will continue dosing at 9 mg BID (increased yesterday). Tacro level 7 this morning from 9.4 Target trough level of 12-14. - - cardizem  240 qd  CellCept - continue 1000 mg PO BID.   Steroids - will give short pulse of oral steroids. Will give 50 mg PO BID for three days with subsequent taper.  Taper starting 3/24 45 mg x2 the 40 x2  decrease by 10  until 15 mg bid- ordered    # Antimicrobial prophylaxis:  PCP - continue Mepron 1500mg PO daily      # Bilateral IJ/Subclavian thrombi - unchanged on f/u U/S 3/12  -   - CHERIE negative    #Acute renal failure, possibly related to vancomycin dosing, improving.   - Vanco stopped.   - No further diuresis.     # CAV PPx  - Continue ECASA 81mg PO daily  - Continue pravastatin 20mg PO QHS    # ID   - Pseudomonas in sputum - course of cefepime completed 3/15  - DLES improved and vancomycin stopped given high trough with NORMAN. Per ID, will redose with daptomycin depending upon repeat vanc level this morning.   - silver sulfadizaine dressings per plastics for R 3rd digit.   - HCV viral load and PCR per protocol. Therapy to be initiated as outpatient by Dr. Thomas (Hepatology)  - Transplant ID following      3/26 Pt for repeat UE thrombus as per HF, r/o propagation  Creat is improving may need medications adjustment  Ambulate  3/27 Biopsy now scheduled for Wednesday. April 4    UE duplex with new innominate vein partial thrombus, cont anticoagulation, now therapeutic. D/w Dr Tavarez     3/28 prograf increased to 9mg bid.  Renal function continues to improve  Will resume hep sq tonight after biopsy this am  3/29 - Prograf 0.5mg po x 1 stat & increase Prograf to 9.5mg po bid  increase CardizemCD to 300mg po daily - received CardizemCD 240 this am therefore Cardizem 60mg po x 1 given this am  CXR w/ RLL haziness - will ultrasound  R LL today  36/30 Hyperkalemia - kayexalate 30 x1  Na bicarb added  Creat improved, now on lovenox  3/31   feeling well today,  VSS, TAC level 10.0, education by transplant coordinator with family, potassium stable  5.0,   cardizem to 360 Qd  4/1    VSS   5 beats wct in NSR,    LAVD site with VAC,   WBC   16.   afebrile,   TAC level   10.1  4/2 Planning on d/c home Tuesday, vac reordered.  Tacro lvl 12 on 10 bid .  Plastics f/u ,  cont silvadene , f/u outpatient.;  4/5 - Rounds made with Dr. Stahl:   tacro level - 22.4 - will hold prograf this evening - & decrease dose to 9mg po bid starting 4/6 IV lasix 40mg x 1;  - with increase tac level - will decrease Cardizem CD to 240mg po daily  4/6 prograf level: 14.4 this am- d/w Dr. Stahl- prograf 5 mg given this am then 12 mg this evening; prograf 9 bid to start in am sat 4/7; VSS;  rt pleural eff- sono site; minimal fluid as per CTU Pa/NP; driveline vac changed today   Discharge planning - Home next week

## 2018-04-06 NOTE — PROGRESS NOTE ADULT - PROBLEM SELECTOR PLAN 2
Continue prednisone at 15 mg PO BID.   Continue CellCept 1000 mg BID.  Given supratherapeutic levels of tacrolimus, we will hold tonight's dose and resume tomorrow at 9 mg PO BID. We will decrease the diltiazem to 240 mg daily.    We will continue on aspirin and Pravachol. Continue prednisone at 15 mg PO BID.   Continue CellCept 1000 mg BID.  We will give 12 mg of tacro tonight (total dose of 17 mg today) and given 9 mg PO BID thereafter. We will continue the diltiazem at reduced dose of 240 mg daily.    We will continue on aspirin and Pravachol.

## 2018-04-06 NOTE — PROGRESS NOTE ADULT - SUBJECTIVE AND OBJECTIVE BOX
[facility] DIVISION OF KIDNEY DISEASES AND HYPERTENSION -- PROGRESS NOTE    Chief complaint: NORMAN in heart transplant patient    24 hour events/subjective: states that PVR was negative for urinary retention        PAST HISTORY  --------------------------------------------------------------------------------  No significant changes to PMH, PSH, FHx, SHx, unless otherwise noted    ALLERGIES & MEDICATIONS  --------------------------------------------------------------------------------  Allergies    No Known Allergies    Intolerances      Standing Inpatient Medications  ALBUTerol/ipratropium for Nebulization 3 milliLiter(s) Nebulizer every 6 hours  aspirin enteric coated 81 milliGRAM(s) Oral daily  atovaquone Suspension 1500 milliGRAM(s) Oral daily  Calcium Citrate + Vit D, 315 mg/200 Unit 2 Tablet(s) 2 Tablet(s) Oral two times a day  diltiazem    milliGRAM(s) Oral daily  docusate sodium 100 milliGRAM(s) Oral three times a day  famotidine    Tablet 20 milliGRAM(s) Oral daily  heparin  Injectable 41623 Unit(s) SubCutaneous <User Schedule>  insulin lispro (HumaLOG) corrective regimen sliding scale   SubCutaneous three times a day before meals  insulin lispro (HumaLOG) corrective regimen sliding scale   SubCutaneous at bedtime  lidocaine 2% Injectable 20 milliLiter(s) Local Injection once  magnesium oxide 800 milliGRAM(s) Oral every 12 hours  multivitamin 1 Tablet(s) Oral daily  mycophenolate mofetil 1000 milliGRAM(s) Oral <User Schedule>  nystatin    Suspension 960214 Unit(s) Oral every 6 hours  pravastatin 20 milliGRAM(s) Oral at bedtime  predniSONE   Tablet 15 milliGRAM(s) Oral <User Schedule>  silver sulfADIAZINE 1% Cream 1 Application(s) Topical two times a day  sodium bicarbonate 650 milliGRAM(s) Oral three times a day  valGANciclovir 450 milliGRAM(s) Oral <User Schedule>    PRN Inpatient Medications  acetaminophen   Tablet. 650 milliGRAM(s) Oral every 6 hours PRN      REVIEW OF SYSTEMS  --------------------------------------------------------------------------------  Constitutional: [ ] Fever [ ] Chills [x ] no Fatigue [ ] Weight change   HEENT: [ ] Blurred vision [ ] Eye Pain [ ] Headache [ ] Runny nose [ ] Sore Throat   Respiratory: [ ] Cough [ ] Wheezing [x ] no Shortness of breath  Cardiovascular: [x ] no Chest Pain [ ] Palpitations [ ] LOBATO [ ] PND [ ] Orthopnea  Gastrointestinal: [ ] Abdominal Pain [ ] Diarrhea [ ] Constipation [ ] Hemorrhoids [ ] Nausea [ ] Vomiting  Genitourinary: [ ] Nocturia [ ] Dysuria [ ] Incontinence  Extremities: [ x]no Swelling [ ] Joint Pain  Neurologic: [ ] Focal deficit [ ] Paresthesias [ ] Syncope  Lymphatic: [ ] Swelling [ ] Lymphadenopathy   Skin: [ ] Rash [ ] Ecchymoses [ ] Wounds [ ] Lesions  Psychiatry: [ ] Depression [ ] Suicidal/Homicidal Ideation [ ] Anxiety [ ] Sleep Disturbances  [x ] 10 point review of systems is otherwise negative except as mentioned above              [ ]Unable to obtain due to   All other systems were reviewed and are negative, except as noted.    VITALS/PHYSICAL EXAM  --------------------------------------------------------------------------------  T(C): 36.6 (04-06-18 @ 11:39), Max: 36.6 (04-05-18 @ 19:50)  HR: 101 (04-06-18 @ 11:39) (99 - 109)  BP: 121/76 (04-06-18 @ 11:39) (110/75 - 127/73)  RR: 18 (04-06-18 @ 11:39) (18 - 18)  SpO2: 98% (04-06-18 @ 11:39) (93% - 98%)  Wt(kg): --        04-05-18 @ 07:01  -  04-06-18 @ 07:00  --------------------------------------------------------  IN: 480 mL / OUT: 495 mL / NET: -15 mL    04-06-18 @ 07:01  -  04-06-18 @ 12:52  --------------------------------------------------------  IN: 720 mL / OUT: 250 mL / NET: 470 mL      Physical Exam:  	Gen: NAD, well-appearing  	HEENT: on room air  	Pulm: CTA B/L  	CV: normal S1S2; no rub  	Abd: soft                      Back : No sacral edema  	: No emily  	LE: no edema  	Skin: Warm, without rashes  	    LABS/STUDIES  --------------------------------------------------------------------------------              9.5    13.5  >-----------<  295      [04-06-18 @ 07:31]              30.0     141  |  103  |  45  ----------------------------<  132      [04-06-18 @ 07:31]  5.3   |  23  |  2.08        Ca     8.8     [04-06-18 @ 07:31]      Mg     2.5     [04-06-18 @ 07:31]      PT/INR: PT 11.6 , INR 1.07       [04-05-18 @ 23:43]  PTT: 30.8       [04-06-18 @ 07:31]      Creatinine Trend:  SCr 2.08 [04-06 @ 07:31]  SCr 1.93 [04-05 @ 07:42]  SCr 1.98 [04-05 @ 06:08]  SCr 1.90 [04-05 @ 01:02]  SCr 2.01 [04-04 @ 10:38]    Urinalysis - [03-23-18 @ 16:03]      Color Yellow / Appearance Slightly Turbid / SG 1.020 / pH 5.0      Gluc Negative / Ketone Negative  / Bili Negative / Urobili Negative       Blood Small / Protein 30 / Leuk Est Small / Nitrite Negative      RBC 5 / WBC 8 / Hyaline 0 / Gran  / Sq Epi  / Non Sq Epi 4 / Bacteria Few      Iron 22, TIBC 182, %sat 12      [02-13-18 @ 12:44]  Ferritin 79.0      [02-13-18 @ 12:44]  HbA1c 5.3      [03-18-18 @ 11:20]  TSH 1.48      [02-27-18 @ 08:13]  Lipid: chol 62, TG 33, HDL 17, LDL 38      [06-07-17 @ 06:14]      C3 Complement 135      [03-19-18 @ 15:13]  C4 Complement 37      [03-19-18 @ 15:13]  Free Light Chains: kappa 4.36, lambda 5.71, ratio = 0.76      [03-19 @ 15:13]  Immunofixation Serum:   No Monoclonal Band Identified      [03-19-18 @ 15:13]  Cryoglobulin: Negative      [03-19-18 @ 15:13]

## 2018-04-06 NOTE — PROGRESS NOTE ADULT - SUBJECTIVE AND OBJECTIVE BOX
VITAL SIGNS    Telemetry:  rsr/ st 100-110  Vital Signs Last 24 Hrs  T(C): 36.6 (18 @ 11:39), Max: 36.6 (18 @ 19:50)  T(F): 97.9 (18 @ 11:39), Max: 97.9 (18 @ 07:07)  HR: 101 (18 @ 11:39) (99 - 109)  BP: 121/76 (18 @ 11:39) (110/75 - 127/73)  RR: 18 (18 @ 11:39) (18 - 18)  SpO2: 98% (18 @ 11:39) (93% - 98%)             @ 07:01  -   @ 07:00  --------------------------------------------------------  IN: 480 mL / OUT: 495 mL / NET: -15 mL     @ 07:01  -   @ 12:03  --------------------------------------------------------  IN: 720 mL / OUT: 250 mL / NET: 470 mL       Daily     Daily Weight in k.4 (2018 06:27)  Admit Wt: Drug Dosing Weight  Height (cm): 172.7 (2018 08:00)  Weight (kg): 69.1 (30 Oct 2017 23:55)  BMI (kg/m2): 23.2 (2018 08:00)  BSA (m2): 1.82 (2018 08:00)      CAPILLARY BLOOD GLUCOSE      POCT Blood Glucose.: 138 mg/dL (2018 11:34)  POCT Blood Glucose.: 125 mg/dL (2018 07:21)  POCT Blood Glucose.: 132 mg/dL (2018 22:05)  POCT Blood Glucose.: 137 mg/dL (2018 19:43)          acetaminophen   Tablet. 650 milliGRAM(s) Oral every 6 hours PRN  ALBUTerol/ipratropium for Nebulization 3 milliLiter(s) Nebulizer every 6 hours  aspirin enteric coated 81 milliGRAM(s) Oral daily  atovaquone Suspension 1500 milliGRAM(s) Oral daily  Calcium Citrate + Vit D, 315 mg/200 Unit 2 Tablet(s) 2 Tablet(s) Oral two times a day  diltiazem    milliGRAM(s) Oral daily  docusate sodium 100 milliGRAM(s) Oral three times a day  famotidine    Tablet 20 milliGRAM(s) Oral daily  heparin  Injectable 00368 Unit(s) SubCutaneous <User Schedule>  insulin lispro (HumaLOG) corrective regimen sliding scale   SubCutaneous three times a day before meals  insulin lispro (HumaLOG) corrective regimen sliding scale   SubCutaneous at bedtime  lidocaine 2% Injectable 20 milliLiter(s) Local Injection once  magnesium oxide 800 milliGRAM(s) Oral every 12 hours  multivitamin 1 Tablet(s) Oral daily  mycophenolate mofetil 1000 milliGRAM(s) Oral <User Schedule>  nystatin    Suspension 427697 Unit(s) Oral every 6 hours  pravastatin 20 milliGRAM(s) Oral at bedtime  predniSONE   Tablet 15 milliGRAM(s) Oral <User Schedule>  silver sulfADIAZINE 1% Cream 1 Application(s) Topical two times a day  sodium bicarbonate 650 milliGRAM(s) Oral three times a day  valGANciclovir 450 milliGRAM(s) Oral <User Schedule>      PHYSICAL EXAM    Subjective: "I feel ok."   Neurology: alert and oriented x 3, nonfocal, no gross deficits  CV : tele:  rsr/ st 100-110  Sternal Wound :  CDI NAZ- healing well.   Lungs: clear. RR easy, unlabored   Abdomen: soft, nontender, nondistended, positive bowel sounds, + bowel movement   Neg N/V/D   :  pt voiding without difficulty   Extremities:   RUVALCABA; neg LE edema, neg calf tenderness.   PPP bilaterally      PW:  none   Chest tubes:   none

## 2018-04-06 NOTE — PROGRESS NOTE ADULT - PROBLEM SELECTOR PLAN 2
in the setting of NORMAN and tacrolimus use. Serum potassium WNL today. Recommend to lower tacrolimus dose. Monitor potassium and give low potassium diet.

## 2018-04-06 NOTE — PROGRESS NOTE ADULT - PROBLEM SELECTOR PLAN 5
Acute increase in creatinine likely related to calcineurin inhibitor effect. Decrease in tacro as above.  He continues on bicarbonate and magnesium supplements. Acute increase in creatinine likely related to calcineurin inhibitor effect. Change in tacro dose as above.  He continues on bicarbonate and magnesium supplements, the latter of which we will increase to 1200 mg PO BID.

## 2018-04-06 NOTE — PROGRESS NOTE ADULT - ASSESSMENT
Assessment:  1.  Bilateral, extensive upper extremity DVT  - Currently on lovenox  - Swelling of RUE controlled with elevation and ace wrap  2.  Heart Transplant  3.  Acute renal failure  4.  Right 3rf digit ischemia - stable  5.  Right foot bunion      Plan  1.  Resume heparin full dose AC when bleeding risk acceptable post thoracentesis.    2.  After the 3rd dose of heparin, would check PTT 6 hours after administration  3.  Will follow with you - would keep goal PTT around 50-60 to minimize bleeding risk  4.  Repeat upper extremity duplex next week  5.  Right digit appears stable/improving, continue with local care.       Thanks      Daysi [id number]

## 2018-04-06 NOTE — PROGRESS NOTE ADULT - SUBJECTIVE AND OBJECTIVE BOX
Subjective:    Medications:  acetaminophen   Tablet. 650 milliGRAM(s) Oral every 6 hours PRN  ALBUTerol/ipratropium for Nebulization 3 milliLiter(s) Nebulizer every 6 hours  aspirin enteric coated 81 milliGRAM(s) Oral daily  atovaquone Suspension 1500 milliGRAM(s) Oral daily  Calcium Citrate + Vit D, 315 mg/200 Unit 2 Tablet(s) 2 Tablet(s) Oral two times a day  diltiazem    milliGRAM(s) Oral daily  docusate sodium 100 milliGRAM(s) Oral three times a day  famotidine    Tablet 20 milliGRAM(s) Oral daily  heparin  Injectable 84463 Unit(s) SubCutaneous <User Schedule>  insulin lispro (HumaLOG) corrective regimen sliding scale   SubCutaneous three times a day before meals  insulin lispro (HumaLOG) corrective regimen sliding scale   SubCutaneous at bedtime  lidocaine 2% Injectable 20 milliLiter(s) Local Injection once  magnesium oxide 800 milliGRAM(s) Oral every 12 hours  multivitamin 1 Tablet(s) Oral daily  mycophenolate mofetil 1000 milliGRAM(s) Oral <User Schedule>  nystatin    Suspension 406754 Unit(s) Oral every 6 hours  pravastatin 20 milliGRAM(s) Oral at bedtime  predniSONE   Tablet 15 milliGRAM(s) Oral <User Schedule>  silver sulfADIAZINE 1% Cream 1 Application(s) Topical two times a day  sodium bicarbonate 650 milliGRAM(s) Oral three times a day  valGANciclovir 450 milliGRAM(s) Oral <User Schedule>      Physical Exam:    Vitals:  T(C): 36.6 (18 @ 11:39), Max: 36.6 (18 @ 19:50)  HR: 101 (18 @ 11:39) (99 - 109)  BP: 121/76 (18 @ 11:39) (110/75 - 127/73)  BP(mean): 93 (18 @ 03:41) (86 - 93)  ABP: --  ABP(mean): --  RR: 18 (18 @ 11:39) (18 - 18)  SpO2: 98% (18 @ 11:39) (93% - 98%)  Wt(kg): --  CVP(cm H2O): --  CO: --  CI: --  PA: --  PA(mean): --  PCWP: --  SVR: --  PVR: --  Vital Signs Last 24 Hrs  T(C): 36.6 (2018 11:39), Max: 36.6 (2018 19:50)  T(F): 97.9 (2018 11:39), Max: 97.9 (2018 07:07)  HR: 101 (2018 11:39) (99 - 109)  BP: 121/76 (2018 11:39) (110/75 - 127/73)  BP(mean): 93 (2018 03:41) (86 - 93)  RR: 18 (2018 11:39) (18 - 18)  SpO2: 98% (2018 11:39) (93% - 98%)    Daily     Daily Weight in k.4 (2018 06:27)    I&O's Summary    2018 07:01  -  2018 07:00  --------------------------------------------------------  IN: 480 mL / OUT: 495 mL / NET: -15 mL    2018 07:01  -  2018 13:51  --------------------------------------------------------  IN: 720 mL / OUT: 250 mL / NET: 470 mL        General: No distress. Comfortable.  HEENT: EOM intact.  Neck: Neck supple. JVP not elevated. No masses  Chest: Clear to auscultation bilaterally  CV: Normal S1 and S2. No murmurs, rub, or gallops. Radial pulses normal.  Abdomen: Soft, non-distended, non-tender  Skin: No rashes or skin breakdown  Neurology: Alert and oriented times three. Sensation intact  Psych: Affect normal    Labs:                        9.5    13.5  )-----------( 295      ( 2018 07:31 )             30.0     04-06    141  |  103  |  45<H>  ----------------------------<  132<H>  5.3   |  23  |  2.08<H>    Ca    8.8      2018 07:31  Mg     2.5     -      PT/INR - ( 2018 23:43 )   PT: 11.6 sec;   INR: 1.07 ratio         PTT - ( 2018 07:31 )  PTT:30.8 sec Subjective: No current complaints. He feels well without shortness of breath. He is not dizzy and has no palpitations.     Medications:  acetaminophen   Tablet. 650 milliGRAM(s) Oral every 6 hours PRN  ALBUTerol/ipratropium for Nebulization 3 milliLiter(s) Nebulizer every 6 hours  aspirin enteric coated 81 milliGRAM(s) Oral daily  atovaquone Suspension 1500 milliGRAM(s) Oral daily  Calcium Citrate + Vit D, 315 mg/200 Unit 2 Tablet(s) 2 Tablet(s) Oral two times a day  diltiazem    milliGRAM(s) Oral daily  docusate sodium 100 milliGRAM(s) Oral three times a day  famotidine    Tablet 20 milliGRAM(s) Oral daily  heparin  Injectable 88744 Unit(s) SubCutaneous <User Schedule>  insulin lispro (HumaLOG) corrective regimen sliding scale   SubCutaneous three times a day before meals  insulin lispro (HumaLOG) corrective regimen sliding scale   SubCutaneous at bedtime  lidocaine 2% Injectable 20 milliLiter(s) Local Injection once  magnesium oxide 800 milliGRAM(s) Oral every 12 hours  multivitamin 1 Tablet(s) Oral daily  mycophenolate mofetil 1000 milliGRAM(s) Oral <User Schedule>  nystatin    Suspension 205759 Unit(s) Oral every 6 hours  pravastatin 20 milliGRAM(s) Oral at bedtime  predniSONE   Tablet 15 milliGRAM(s) Oral <User Schedule>  silver sulfADIAZINE 1% Cream 1 Application(s) Topical two times a day  sodium bicarbonate 650 milliGRAM(s) Oral three times a day  valGANciclovir 450 milliGRAM(s) Oral <User Schedule>      Physical Exam:    Vital Signs Last 24 Hrs  T(C): 36.6 (2018 11:39), Max: 36.6 (2018 19:50)  T(F): 97.9 (2018 11:39), Max: 97.9 (2018 07:07)  HR: 101 (2018 11:39) (99 - 109)  BP: 121/76 (2018 11:39) (110/75 - 127/73)  BP(mean): 93 (2018 03:41) (86 - 93)  RR: 18 (2018 11:39) (18 - 18)  SpO2: 98% (2018 11:39) (93% - 98%)    Daily     Daily Weight in k.4 (2018 06:27)    I&O's Summary    2018 07:01  -  2018 07:00  --------------------------------------------------------  IN: 480 mL / OUT: 495 mL / NET: -15 mL    2018 07:  -  2018 13:51  --------------------------------------------------------  IN: 720 mL / OUT: 250 mL / NET: 470 mL    General: No distress. Comfortable.  HEENT: EOM intact.  Neck: Neck supple. JVP not elevated. No masses  Chest: Clear to auscultation bilaterally  CV: RRR with normal S1 and S2. III/VI systolic murmur heard at LLSB. No rubs or gallops. Radial pulses normal.  Abdomen: Soft, non-distended, non-tender  Skin: No rashes or skin breakdown  Neurology: Alert and oriented times three. Sensation intact  Psych: Affect normal    Labs:                        9.5    13.5  )-----------( 295      ( 2018 07:31 )             30.0     04-06    141  |  103  |  45<H>  ----------------------------<  132<H>  5.3   |  23  |  2.08<H>    Ca    8.8      2018 07:31  Mg     2.5     04-    PT/INR - ( 2018 23:43 )   PT: 11.6 sec;   INR: 1.07 ratio       PTT - ( 2018 07:31 )  PTT:30.8 sec    Tacrolimus (), Serum (18 @ 08:44)    Tacrolimus (), Serum: 22.4:     Tacrolimus (), Serum (18 @ 08:12)    Tacrolimus (), Serum: 14.4:

## 2018-04-06 NOTE — PROGRESS NOTE ADULT - PROBLEM SELECTOR PLAN 1
continue meds as per transplant team  prograf level this am- 14.4- d/w Dr. Stahl- prograf 5 mg given this am  will discuss evening dose continue meds as per transplant team  prograf level this am- 14.4- d/w Dr. Stahl- prograf 5 mg given this am then 12 this evening; then prograf 9 bid   next cardiac biopsy 4/18

## 2018-04-06 NOTE — PROGRESS NOTE ADULT - ASSESSMENT
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 Renal reconsulted for elevated cr. It seems cr has been stable at 0.9 - 1.1 but on 3/14 it was 1.2 and raakn to mid 3 and now downtrending and stable at 1.6mg/dl. Most likely this was vancomycin induced cast nephropathy that is resolving in setting of perhaps some component of vasoconstriction from tacrolimus. Last few days, most active active issues related to hyperkalemia.

## 2018-04-06 NOTE — PROGRESS NOTE ADULT - ASSESSMENT
Mr. Baez is a 67 year old man with ACC/AHA stage D chronic systolic heart failure due to NICM s/p HM2 LVAD 6/17 c/b pump thrombosis now s/p heart transplant on 2/23 (CMV D-/R+ and Toxo D-/R+), with post-op course c/b primary graft dysfunction, initially thought to be immune-mediated due to positive B-cell flow (negative CDC cross match) and received plasmapharesis/IVIg x2 with improvement in LV function. His course has been complicated by bilateral IJ/subclavian thrombi and he has a persistent right sided pleural effusion as well as acute on chronic renal failure.     His echocardiogram on 4/3 showed evidence of slight worsened LV strain, LVEF of 52%, and slightly diminished right ventricular systolic function. It was determined that he missed five doses of prednisone. RHC today showed elevated biventricular filling pressures with normal cardiac output. Biopsy showed 1R/1A cellular rejection. C4D continues to be diffusely positive. DSA is unremarkable. His tacrolimus level is supratherapeutic although his renal function is slightly improved today.     Please call me with questions at [phone]. Plan discussed with Dr. Parker and 2 [facility] NP team. Mr. Baez is a 67 year old man with ACC/AHA stage D chronic systolic heart failure due to NICM s/p HM2 LVAD 6/17 c/b pump thrombosis now s/p heart transplant on 2/23 (CMV D-/R+ and Toxo D-/R+), with post-op course c/b primary graft dysfunction, initially thought to be immune-mediated due to positive B-cell flow (negative CDC cross match) and received plasmapharesis/IVIg x2 with improvement in LV function. His course has been complicated by bilateral IJ/subclavian thrombi and he has a persistent right sided pleural effusion as well as acute on chronic renal failure.     His echocardiogram on 4/3 showed evidence of slight worsened LV strain, LVEF of 52%, and slightly diminished right ventricular systolic function. It was determined that he missed five doses of prednisone. RHC today showed elevated biventricular filling pressures with normal cardiac output. Biopsy showed 1R/1A cellular rejection. C4D continues to be diffusely positive. DSA is unremarkable. His tacrolimus level is within range today, but has fluctuated significantly.     Please call me with questions at [phone]. Plan discussed with Dr. Parker and 2 [facility] NP team.

## 2018-04-06 NOTE — PROGRESS NOTE ADULT - PROBLEM SELECTOR PLAN 1
We will continue current steroid dose at prednisone 15 mg PO BID.   DSA was negative. We will consider the possibility of non-HLA antibodies leading to the persistent C4D positivity given persistent allograft dysfunction.

## 2018-04-06 NOTE — PROGRESS NOTE ADULT - SUBJECTIVE AND OBJECTIVE BOX
INFECTIOUS DISEASES FOLLOW UP-- Sujey Lujan  [phone]    This is a follow up note for this  67yMale with  Heart replaced by heart assist device      ROS:  CONSTITUTIONAL:  No fever, good appetite  CARDIOVASCULAR:  No chest pain or palpitations  RESPIRATORY:  No dyspnea  GASTROINTESTINAL:  No nausea, vomiting, diarrhea, or abdominal pain  GENITOURINARY:  No dysuria  NEUROLOGIC:  No headache,     Allergies    No Known Allergies    Intolerances        ANTIBIOTICS/RELEVANT:  antimicrobials  atovaquone Suspension 1500 milliGRAM(s) Oral daily  nystatin    Suspension 707281 Unit(s) Oral every 6 hours  valGANciclovir 450 milliGRAM(s) Oral <User Schedule>    immunologic:  mycophenolate mofetil 1000 milliGRAM(s) Oral <User Schedule>  tacrolimus 12 milliGRAM(s) Oral <User Schedule>    OTHER:  acetaminophen   Tablet. 650 milliGRAM(s) Oral every 6 hours PRN  ALBUTerol/ipratropium for Nebulization 3 milliLiter(s) Nebulizer every 6 hours  aspirin enteric coated 81 milliGRAM(s) Oral daily  Calcium Citrate + Vit D, 315 mg/200 Unit 2 Tablet(s) 2 Tablet(s) Oral two times a day  diltiazem    milliGRAM(s) Oral daily  docusate sodium 100 milliGRAM(s) Oral three times a day  famotidine    Tablet 20 milliGRAM(s) Oral daily  heparin  Injectable 94108 Unit(s) SubCutaneous <User Schedule>  insulin lispro (HumaLOG) corrective regimen sliding scale   SubCutaneous three times a day before meals  insulin lispro (HumaLOG) corrective regimen sliding scale   SubCutaneous at bedtime  lidocaine 2% Injectable 20 milliLiter(s) Local Injection once  magnesium oxide 1200 milliGRAM(s) Oral <User Schedule>  multivitamin 1 Tablet(s) Oral daily  pravastatin 20 milliGRAM(s) Oral at bedtime  predniSONE   Tablet 15 milliGRAM(s) Oral <User Schedule>  silver sulfADIAZINE 1% Cream 1 Application(s) Topical two times a day  sodium bicarbonate 650 milliGRAM(s) Oral three times a day      Objective:  Vital Signs Last 24 Hrs  T(C): 36.3 (06 Apr 2018 15:25), Max: 36.6 (05 Apr 2018 19:50)  T(F): 97.4 (06 Apr 2018 15:25), Max: 97.9 (06 Apr 2018 07:07)  HR: 97 (06 Apr 2018 15:25) (97 - 109)  BP: 118/79 (06 Apr 2018 15:25) (115/81 - 127/73)  BP(mean): 93 (06 Apr 2018 03:41) (89 - 93)  RR: 18 (06 Apr 2018 15:25) (18 - 18)  SpO2: 98% (06 Apr 2018 15:25) (93% - 98%)    PHYSICAL EXAM:  Constitutional:no acute distress  Eyes:WALT, EOMI  Ear/Nose/Throat: no oral lesions, 	  Respiratory: clear BL  Cardiovascular: S1S2  Gastrointestinal:soft, (+) BS, no tenderness  Extremities:no e/e/c right middle finger unchanged, blackened tip  No Lymphadenopathy  IV sites not inflammed.    LABS:                        9.5    13.5  )-----------( 295      ( 06 Apr 2018 07:31 )             30.0     04-06    141  |  103  |  45<H>  ----------------------------<  132<H>  5.3   |  23  |  2.08<H>    Ca    8.8      06 Apr 2018 07:31  Mg     2.5     04-06      PT/INR - ( 05 Apr 2018 23:43 )   PT: 11.6 sec;   INR: 1.07 ratio         PTT - ( 06 Apr 2018 07:31 )  PTT:30.8 sec      MICROBIOLOGY:            RECENT CULTURES:      RADIOLOGY & ADDITIONAL STUDIES:  < from: Xray Chest 1 View- PORTABLE-Routine (04.06.18 @ 05:35) >  IMPRESSION:  No change in right pleural effusion.    < end of copied text >

## 2018-04-06 NOTE — PROGRESS NOTE ADULT - PROBLEM SELECTOR PLAN 1
Pt. with hemodynamically mediated NORMAN in the setting of post cardiac transplant, and tacrolimus, vancomycin and bactrim use. Scr elevated but stable today, likely 2/2 elevated tacrolimus level. Tacrolimus dose to be decreased by heart transplant team. Monitor BMP. Strict I/Os. Avoid nephrotoxins. Renally dose all medications.

## 2018-04-06 NOTE — PROGRESS NOTE ADULT - SUBJECTIVE AND OBJECTIVE BOX
VASCULAR MEDICINE                         CC:  UEDVT    INTERVAL HISTORY:     No CP or SOB. R arm without symptoms. Plan for thoracentesis today.  Heparin being held.     MEDICATIONS  (STANDING):  ALBUTerol/ipratropium for Nebulization 3 milliLiter(s) Nebulizer every 6 hours  aspirin enteric coated 81 milliGRAM(s) Oral daily  atovaquone Suspension 1500 milliGRAM(s) Oral daily  Calcium Citrate + Vit D, 315 mg/200 Unit 2 Tablet(s) 2 Tablet(s) Oral two times a day  diltiazem    milliGRAM(s) Oral daily  docusate sodium 100 milliGRAM(s) Oral three times a day  famotidine    Tablet 20 milliGRAM(s) Oral daily  heparin  Injectable 30260 Unit(s) SubCutaneous <User Schedule>  insulin lispro (HumaLOG) corrective regimen sliding scale   SubCutaneous three times a day before meals  insulin lispro (HumaLOG) corrective regimen sliding scale   SubCutaneous at bedtime  lidocaine 2% Injectable 20 milliLiter(s) Local Injection once  magnesium oxide 800 milliGRAM(s) Oral every 12 hours  multivitamin 1 Tablet(s) Oral daily  mycophenolate mofetil 1000 milliGRAM(s) Oral <User Schedule>  nystatin    Suspension 582490 Unit(s) Oral every 6 hours  pravastatin 20 milliGRAM(s) Oral at bedtime  predniSONE   Tablet 15 milliGRAM(s) Oral <User Schedule>  silver sulfADIAZINE 1% Cream 1 Application(s) Topical two times a day  sodium bicarbonate 650 milliGRAM(s) Oral three times a day  valGANciclovir 450 milliGRAM(s) Oral <User Schedule>    MEDICATIONS  (PRN):  acetaminophen   Tablet. 650 milliGRAM(s) Oral every 6 hours PRN Mild Pain (1 - 3)        REVIEW OF SYSTEMS:  CONSTITUTIONAL: No fever, weight loss, or fatigue  EYES: No eye pain, visual disturbances, or discharge  ENMT:  No difficulty hearing, tinnitus, vertigo; No sinus or throat pain  NECK: No pain or stiffness  RESPIRATORY: No cough, wheezing, chills or hemoptysis; No Shortness of Breath  CARDIOVASCULAR: No chest pain, palpitations, passing out, dizziness, or leg swelling  GASTROINTESTINAL: No abdominal or epigastric pain. No nausea, vomiting, or hematemesis; No diarrhea or constipation. No melena or hematochezia.  GENITOURINARY: No dysuria, frequency, hematuria, or incontinence  NEUROLOGICAL: No headaches, memory loss, loss of strength, numbness, or tremors  SKIN: No itching, burning, rashes, or lesions   LYMPH Nodes: No enlarged glands  ENDOCRINE: No heat or cold intolerance; No hair loss  MUSCULOSKELETAL: No joint pain or swelling; No muscle, back, or extremity pain  PSYCHIATRIC: No depression, anxiety, mood swings, or difficulty sleeping  HEME/LYMPH: No easy bruising, or bleeding gums  ALLERY AND IMMUNOLOGIC: No hives or eczema	    [x ] All others negative	  [ ] Unable to obtain  ICU Vital Signs Last 24 Hrs    T(C): 36.6 (06 Apr 2018 07:07), Max: 36.6 (05 Apr 2018 11:05)  T(F): 97.9 (06 Apr 2018 07:07), Max: 97.9 (05 Apr 2018 11:05)  HR: 100 (06 Apr 2018 07:07) (99 - 109)  BP: 118/77 (06 Apr 2018 07:07) (107/70 - 127/73)  BP(mean): 93 (06 Apr 2018 03:41) (86 - 93)  ABP: --  ABP(mean): --  RR: 18 (06 Apr 2018 07:07) (18 - 18)  SpO2: 96% (06 Apr 2018 07:07) (93% - 100%)      Appearance: Normal	  HEENT:   Normal oral mucosa, PERRL, EOMI	  Lymphatic: No lymphadenopathy  Cardiovascular: Normal S1 S2   Respiratory: Lungs clear to auscultation	  Psychiatry: A & O x 3, Mood & affect appropriate  Gastrointestinal:  Soft, Non-tender, + BS	  Skin: No rashes, No ecchymoses, No cyanosis	  Neurologic: Non-focal  Extremities:  Very mild Right upper extremity bicep area fullness and edema.  Forearm without edema.  Strong Right radial pulse.  The 3rd digit is has distal eschar.  The dorsal aspect is larger and has a small break in the skin.                             9.5    13.5  )-----------( 295      ( 06 Apr 2018 07:31 )             30.0   04-06    141  |  103  |  45<H>  ----------------------------<  132<H>  5.3   |  23  |  2.08<H>    Ca    8.8      06 Apr 2018 07:31  Mg     2.5     04-06

## 2018-04-06 NOTE — PROGRESS NOTE ADULT - ASSESSMENT
67 year old man with ACC/AHA stage D chronic systolic heart failure due to NICM (LVEF 20%) s/p HM2 LVAD 6/17 c/b pump thrombus now s/p OHT 2/23 (CMV D-/R+ and Toxo D-/R+), with post-op course c/b primary graft dysfunction, initially thought to be immune-mediated due to positive B-cell flow (negative CDC cross match) and received plasmapheresis/IVIG x2 with improvement in LV function. Found to have b/l IJ/subclavian thrombi as well. CrCl somewhat decreased from baseline pre-tx.    1) Antimicrobial prophylaxis:  Intermediate risk CMV - Valcyte 450mg adjusted to tiw based upon rising creatinine  Intermediate risk Toxo/PCP - continue Mepron 1500mg PO daily with food.  Thrush - continue Nystatin S/S 5 mL Qid  HCV+ donor/HCV- recipient; HCV (1a) - 3/27 HCV PCR 32,891 (To be treated outpatient, monitoring weekly)      2) Leukocytosis improved        Gary Lujan MD  [phone]  After 5pm/weekends [phone]

## 2018-04-07 LAB
ANION GAP SERPL CALC-SCNC: 12 MMOL/L — SIGNIFICANT CHANGE UP (ref 5–17)
APTT BLD: 42.2 SEC — HIGH (ref 27.5–37.4)
BUN SERPL-MCNC: 39 MG/DL — HIGH (ref 7–23)
CALCIUM SERPL-MCNC: 9.1 MG/DL — SIGNIFICANT CHANGE UP (ref 8.4–10.5)
CHLORIDE SERPL-SCNC: 105 MMOL/L — SIGNIFICANT CHANGE UP (ref 96–108)
CO2 SERPL-SCNC: 23 MMOL/L — SIGNIFICANT CHANGE UP (ref 22–31)
CREAT SERPL-MCNC: 1.55 MG/DL — HIGH (ref 0.5–1.3)
GLUCOSE BLDC GLUCOMTR-MCNC: 128 MG/DL — HIGH (ref 70–99)
GLUCOSE BLDC GLUCOMTR-MCNC: 133 MG/DL — HIGH (ref 70–99)
GLUCOSE BLDC GLUCOMTR-MCNC: 192 MG/DL — HIGH (ref 70–99)
GLUCOSE BLDC GLUCOMTR-MCNC: 201 MG/DL — HIGH (ref 70–99)
GLUCOSE BLDC GLUCOMTR-MCNC: 330 MG/DL — HIGH (ref 70–99)
GLUCOSE SERPL-MCNC: 133 MG/DL — HIGH (ref 70–99)
HCT VFR BLD CALC: 29.8 % — LOW (ref 39–50)
HGB BLD-MCNC: 9.5 G/DL — LOW (ref 13–17)
MAGNESIUM SERPL-MCNC: 2.4 MG/DL — SIGNIFICANT CHANGE UP (ref 1.6–2.6)
MCHC RBC-ENTMCNC: 29.6 PG — SIGNIFICANT CHANGE UP (ref 27–34)
MCHC RBC-ENTMCNC: 31.7 GM/DL — LOW (ref 32–36)
MCV RBC AUTO: 93.2 FL — SIGNIFICANT CHANGE UP (ref 80–100)
PLATELET # BLD AUTO: 316 K/UL — SIGNIFICANT CHANGE UP (ref 150–400)
POTASSIUM SERPL-MCNC: 5.3 MMOL/L — SIGNIFICANT CHANGE UP (ref 3.5–5.3)
POTASSIUM SERPL-SCNC: 5.3 MMOL/L — SIGNIFICANT CHANGE UP (ref 3.5–5.3)
RBC # BLD: 3.2 M/UL — LOW (ref 4.2–5.8)
RBC # FLD: 18.4 % — HIGH (ref 10.3–14.5)
SODIUM SERPL-SCNC: 140 MMOL/L — SIGNIFICANT CHANGE UP (ref 135–145)
TACROLIMUS SERPL-MCNC: 14.9 NG/ML — SIGNIFICANT CHANGE UP
WBC # BLD: 14.2 K/UL — HIGH (ref 3.8–10.5)
WBC # FLD AUTO: 14.2 K/UL — HIGH (ref 3.8–10.5)

## 2018-04-07 PROCEDURE — 71045 X-RAY EXAM CHEST 1 VIEW: CPT | Mod: 26

## 2018-04-07 PROCEDURE — 99233 SBSQ HOSP IP/OBS HIGH 50: CPT

## 2018-04-07 RX ORDER — ENOXAPARIN SODIUM 100 MG/ML
70 INJECTION SUBCUTANEOUS EVERY 12 HOURS
Qty: 0 | Refills: 0 | Status: DISCONTINUED | OUTPATIENT
Start: 2018-04-07 | End: 2018-04-07

## 2018-04-07 RX ORDER — MAGNESIUM OXIDE 400 MG ORAL TABLET 241.3 MG
1200 TABLET ORAL
Qty: 0 | Refills: 0 | Status: DISCONTINUED | OUTPATIENT
Start: 2018-04-07 | End: 2018-04-10

## 2018-04-07 RX ORDER — ENOXAPARIN SODIUM 100 MG/ML
70 INJECTION SUBCUTANEOUS
Qty: 0 | Refills: 0 | Status: DISCONTINUED | OUTPATIENT
Start: 2018-04-07 | End: 2018-04-10

## 2018-04-07 RX ORDER — VALGANCICLOVIR 450 MG/1
450 TABLET, FILM COATED ORAL
Qty: 0 | Refills: 0 | Status: DISCONTINUED | OUTPATIENT
Start: 2018-04-07 | End: 2018-04-10

## 2018-04-07 RX ORDER — ENOXAPARIN SODIUM 100 MG/ML
70 INJECTION SUBCUTANEOUS
Qty: 0 | Refills: 0 | Status: DISCONTINUED | OUTPATIENT
Start: 2018-04-07 | End: 2018-04-07

## 2018-04-07 RX ADMIN — Medication 500000 UNIT(S): at 11:15

## 2018-04-07 RX ADMIN — Medication 1 TABLET(S): at 11:19

## 2018-04-07 RX ADMIN — Medication 20 MILLIGRAM(S): at 22:18

## 2018-04-07 RX ADMIN — Medication 81 MILLIGRAM(S): at 11:19

## 2018-04-07 RX ADMIN — HEPARIN SODIUM 14000 UNIT(S): 5000 INJECTION INTRAVENOUS; SUBCUTANEOUS at 11:16

## 2018-04-07 RX ADMIN — Medication 15 MILLIGRAM(S): at 20:03

## 2018-04-07 RX ADMIN — MAGNESIUM OXIDE 400 MG ORAL TABLET 1200 MILLIGRAM(S): 241.3 TABLET ORAL at 08:22

## 2018-04-07 RX ADMIN — Medication 650 MILLIGRAM(S): at 06:16

## 2018-04-07 RX ADMIN — Medication 3 MILLILITER(S): at 17:39

## 2018-04-07 RX ADMIN — Medication 3 MILLILITER(S): at 11:15

## 2018-04-07 RX ADMIN — Medication 15 MILLIGRAM(S): at 08:21

## 2018-04-07 RX ADMIN — ATOVAQUONE 1500 MILLIGRAM(S): 750 SUSPENSION ORAL at 11:19

## 2018-04-07 RX ADMIN — Medication 3 MILLILITER(S): at 06:16

## 2018-04-07 RX ADMIN — Medication 1 APPLICATION(S): at 17:39

## 2018-04-07 RX ADMIN — Medication 650 MILLIGRAM(S): at 22:18

## 2018-04-07 RX ADMIN — Medication 4: at 19:52

## 2018-04-07 RX ADMIN — FAMOTIDINE 20 MILLIGRAM(S): 10 INJECTION INTRAVENOUS at 11:15

## 2018-04-07 RX ADMIN — TACROLIMUS 9 MILLIGRAM(S): 5 CAPSULE ORAL at 08:21

## 2018-04-07 RX ADMIN — Medication 500000 UNIT(S): at 06:16

## 2018-04-07 RX ADMIN — TACROLIMUS 9 MILLIGRAM(S): 5 CAPSULE ORAL at 20:04

## 2018-04-07 RX ADMIN — ENOXAPARIN SODIUM 70 MILLIGRAM(S): 100 INJECTION SUBCUTANEOUS at 22:17

## 2018-04-07 RX ADMIN — MYCOPHENOLATE MOFETIL 1000 MILLIGRAM(S): 250 CAPSULE ORAL at 08:22

## 2018-04-07 RX ADMIN — MYCOPHENOLATE MOFETIL 1000 MILLIGRAM(S): 250 CAPSULE ORAL at 20:03

## 2018-04-07 RX ADMIN — Medication 500000 UNIT(S): at 17:39

## 2018-04-07 RX ADMIN — Medication 3 MILLILITER(S): at 00:20

## 2018-04-07 RX ADMIN — Medication 650 MILLIGRAM(S): at 13:54

## 2018-04-07 RX ADMIN — Medication 240 MILLIGRAM(S): at 06:16

## 2018-04-07 RX ADMIN — Medication 2: at 12:15

## 2018-04-07 RX ADMIN — Medication 1 APPLICATION(S): at 06:17

## 2018-04-07 RX ADMIN — Medication 500000 UNIT(S): at 00:20

## 2018-04-07 NOTE — PROGRESS NOTE ADULT - PROBLEM SELECTOR PLAN 4
Given acute renal failure, we will change back to UFH injected subcutaneously. As his renal function has improved, we will change back to subcutaneous enoxaparin.

## 2018-04-07 NOTE — PROGRESS NOTE ADULT - PROBLEM SELECTOR PLAN 3
On ultrasound, this does not appear large. We will continue to monitor. On ultrasound, this does not appear large. We will continue to monitor. Appearance may be related to elevated right diaphragm.

## 2018-04-07 NOTE — PROGRESS NOTE ADULT - SUBJECTIVE AND OBJECTIVE BOX
VITAL SIGNS    Telemetry:  St   Vital Signs Last 24 Hrs  T(C): 35.9 (18 @ 15:43), Max: 36.6 (18 @ 19:40)  T(F): 96.6 (18 @ 15:43), Max: 97.9 (18 @ 12:03)  HR: 103 (18 @ 15:43) (101 - 106)  BP: 123/78 (18 @ 15:43) (118/81 - 129/94)  RR: 18 (18 @ 15:43) (18 - 18)  SpO2: 97% (18 @ 15:43) (96% - 98%)             @ 07:01  -   @ 07:00  --------------------------------------------------------  IN: 1300 mL / OUT: 975 mL / NET: 325 mL     @ 07:01  -   @ 17:06  --------------------------------------------------------  IN: 360 mL / OUT: 500 mL / NET: -140 mL       Daily     Daily Weight in k.9 (2018 04:19)  Admit Wt: Drug Dosing Weight  Height (cm): 172.7 (2018 08:00)  Weight (kg): 73.9 (2018 12:31)  BMI (kg/m2): 24.8 (2018 12:31)  BSA (m2): 1.87 (2018 12:31)      CAPILLARY BLOOD GLUCOSE      POCT Blood Glucose.: 192 mg/dL (2018 11:58)  POCT Blood Glucose.: 330 mg/dL (2018 11:47)  POCT Blood Glucose.: 128 mg/dL (2018 07:21)  POCT Blood Glucose.: 142 mg/dL (2018 22:11)  POCT Blood Glucose.: 122 mg/dL (2018 19:27)          MEDICATIONS  acetaminophen   Tablet. 650 milliGRAM(s) Oral every 6 hours PRN  ALBUTerol/ipratropium for Nebulization 3 milliLiter(s) Nebulizer every 6 hours  aspirin enteric coated 81 milliGRAM(s) Oral daily  atovaquone Suspension 1500 milliGRAM(s) Oral daily  Calcium Citrate + Vit D, 315 mg/200 Unit 2 Tablet(s) 2 Tablet(s) Oral two times a day  diltiazem    milliGRAM(s) Oral daily  docusate sodium 100 milliGRAM(s) Oral three times a day  enoxaparin Injectable 70 milliGRAM(s) SubCutaneous <User Schedule>  famotidine    Tablet 20 milliGRAM(s) Oral daily  insulin lispro (HumaLOG) corrective regimen sliding scale   SubCutaneous three times a day before meals  insulin lispro (HumaLOG) corrective regimen sliding scale   SubCutaneous at bedtime  lidocaine 2% Injectable 20 milliLiter(s) Local Injection once  magnesium oxide 1200 milliGRAM(s) Oral <User Schedule>  multivitamin 1 Tablet(s) Oral daily  mycophenolate mofetil 1000 milliGRAM(s) Oral <User Schedule>  nystatin    Suspension 355968 Unit(s) Oral every 6 hours  pravastatin 20 milliGRAM(s) Oral at bedtime  predniSONE   Tablet 15 milliGRAM(s) Oral <User Schedule>  silver sulfADIAZINE 1% Cream 1 Application(s) Topical two times a day  sodium bicarbonate 650 milliGRAM(s) Oral three times a day  tacrolimus 9 milliGRAM(s) Oral <User Schedule>  valGANciclovir 450 milliGRAM(s) Oral <User Schedule>      PHYSICAL EXAM  Subjective: NAD  Neurology: alert and oriented x 3, nonfocal, no gross deficits  CV : S1S2  Sternal Wound :  CDI , Stable  Lungs: CTA b/l  Abdomen: soft, NT,ND, (+ )BM  :  voiding  Extremities:  -c/c/e    LABS  -    140  |  105  |  39<H>  ----------------------------<  133<H>  5.3   |  23  |  1.55<H>    Ca    9.1      2018 07:36  Mg     2.4     -07                                   9.5    14.2  )-----------( 316      ( 2018 07:36 )             29.8          PT/INR - ( 2018 23:43 )   PT: 11.6 sec;   INR: 1.07 ratio         PTT - ( 2018 07:35 )  PTT:42.2 sec       PAST MEDICAL & SURGICAL HISTORY:  GIB (gastrointestinal bleeding)  Ventricular fibrillation: s/p AICD  PAF (paroxysmal atrial fibrillation): on xarelto  Non-Ischemic Cardiomyopathy  SVT (Supraventricular Tachycardia)  HTN  CHF (Congestive Heart Failure)  LVAD (left ventricular assist device) present  Status post left hip replacement  History of Prior Ablation Treatment: for afib  AICD (Automatic Cardioverter/Defibrillator) Present: [employer] with 1 [employer] lead09- explanted and replaced with [employer]tronic 2 leads on 09

## 2018-04-07 NOTE — PROGRESS NOTE ADULT - SUBJECTIVE AND OBJECTIVE BOX
Subjective: He feels well today without shortness of breath. He was able to walk for a brief distance without the aid of a walker. No dizziness. No cough.     Medications:  acetaminophen   Tablet. 650 milliGRAM(s) Oral every 6 hours PRN  ALBUTerol/ipratropium for Nebulization 3 milliLiter(s) Nebulizer every 6 hours  aspirin enteric coated 81 milliGRAM(s) Oral daily  atovaquone Suspension 1500 milliGRAM(s) Oral daily  Calcium Citrate + Vit D, 315 mg/200 Unit 2 Tablet(s) 2 Tablet(s) Oral two times a day  diltiazem    milliGRAM(s) Oral daily  docusate sodium 100 milliGRAM(s) Oral three times a day  famotidine    Tablet 20 milliGRAM(s) Oral daily  insulin lispro (HumaLOG) corrective regimen sliding scale   SubCutaneous three times a day before meals  insulin lispro (HumaLOG) corrective regimen sliding scale   SubCutaneous at bedtime  lidocaine 2% Injectable 20 milliLiter(s) Local Injection once  magnesium oxide 1200 milliGRAM(s) Oral <User Schedule>  multivitamin 1 Tablet(s) Oral daily  mycophenolate mofetil 1000 milliGRAM(s) Oral <User Schedule>  nystatin    Suspension 633728 Unit(s) Oral every 6 hours  pravastatin 20 milliGRAM(s) Oral at bedtime  predniSONE   Tablet 15 milliGRAM(s) Oral <User Schedule>  silver sulfADIAZINE 1% Cream 1 Application(s) Topical two times a day  sodium bicarbonate 650 milliGRAM(s) Oral three times a day  tacrolimus 9 milliGRAM(s) Oral <User Schedule>  valGANciclovir 450 milliGRAM(s) Oral <User Schedule>      Physical Exam:    Vital Signs Last 24 Hrs  T(C): 36.6 (2018 12:03), Max: 36.6 (2018 19:40)  T(F): 97.9 (2018 12:03), Max: 97.9 (2018 12:03)  HR: 106 (2018 12:03) (97 - 106)  BP: 118/81 (2018 12:03) (118/79 - 129/94)  BP(mean): 101 (2018 04:19) (97 - 101)  RR: 18 (2018 12:03) (18 - 18)  SpO2: 96% (2018 12:03) (96% - 98%)      Daily Weight in k.9 (2018 04:19)    I&O's Summary    2018 07:01  -  2018 07:00  --------------------------------------------------------  IN: 1300 mL / OUT: 975 mL / NET: 325 mL    2018 07:01  -  2018 12:42  --------------------------------------------------------  IN: 360 mL / OUT: 500 mL / NET: -140 mL    General: No distress. Comfortable.  HEENT: EOM intact.  Neck: Neck supple. JVP not elevated. No masses  Chest: Clear to auscultation bilaterally  CV: Normal S1 and S2. No murmurs, rub, or gallops. Radial pulses normal.  Abdomen: Soft, non-distended, non-tender  Skin: No rashes or skin breakdown  Neurology: Alert and oriented times three. Sensation intact  Psych: Affect normal    Labs:                        9.5    14.2  )-----------( 316      ( 2018 07:36 )             29.8     04-07    140  |  105  |  39<H>  ----------------------------<  133<H>  5.3   |  23  |  1.55<H>    Ca    9.1      2018 07:36  Mg     2.4     04-07      PT/INR - ( 2018 23:43 )   PT: 11.6 sec;   INR: 1.07 ratio         PTT - ( 2018 07:35 )  PTT:42.2 sec    Tacrolimus (), Serum: 14.9: Tacrolimus testing is performed on the [employer]  by  chemiluminescent microparticle immunoassay. The therapeutic range of  tacrolimus is not clearly defined but target 12-hour trough whole blood  concentrations are 5.0 - 20.0 ng/mL early post transplant. Twenty-four  hour  trough concentrations are 33-50% less than the corresponding 12-hour  trough. ng/mL (18 @ 08:47)

## 2018-04-07 NOTE — PROGRESS NOTE ADULT - PROBLEM SELECTOR PLAN 2
Continue prednisone at 15 mg PO BID.   Continue CellCept 1000 mg BID.  We will give 12 mg of tacro tonight (total dose of 17 mg today) and given 9 mg PO BID thereafter. We will continue the diltiazem at reduced dose of 240 mg daily.    We will continue on aspirin and Pravachol. Continue prednisone at 15 mg PO BID.   Continue CellCept 1000 mg BID.  We will continue tacrolimus 9 mg PO BID thereafter. We will continue the diltiazem at reduced dose of 240 mg daily.    We will continue on aspirin and Pravachol.

## 2018-04-07 NOTE — PROGRESS NOTE ADULT - PROBLEM SELECTOR PLAN 1
continue meds as per transplant team  prograf level this am- 14.9- d/w Dr. Stahl- prograf 9mg given this am then this evening; then prograf 9mg bid   next cardiac biopsy 4/18

## 2018-04-07 NOTE — PROGRESS NOTE ADULT - ASSESSMENT
Mr. Baez is a 67 year old man with ACC/AHA stage D chronic systolic heart failure due to NICM s/p HM2 LVAD 6/17 c/b pump thrombosis now s/p heart transplant on 2/23 (CMV D-/R+ and Toxo D-/R+), with post-op course c/b primary graft dysfunction, initially thought to be immune-mediated due to positive B-cell flow (negative CDC cross match) and received plasmapharesis/IVIg x2 with improvement in LV function. His course has been complicated by bilateral IJ/subclavian thrombi and he has a persistent right sided pleural effusion as well as acute on chronic renal failure.     His echocardiogram on 4/3 showed evidence of slight worsened LV strain, LVEF of 52%, and slightly diminished right ventricular systolic function. It was determined that he missed five doses of prednisone. RHC today showed elevated biventricular filling pressures with normal cardiac output. Biopsy showed 1R/1A cellular rejection. C4D continues to be diffusely positive. DSA is unremarkable. His tacrolimus level is within range today, but has fluctuated significantly.     Please call me with questions at [phone]. Plan discussed with Dr. Parker and 2 Gonzalo NP team. Mr. Baez is a 67 year old man with ACC/AHA stage D chronic systolic heart failure due to NICM s/p HM2 LVAD 6/17 c/b pump thrombosis now s/p heart transplant on 2/23 (CMV D-/R+ and Toxo D-/R+), with post-op course c/b primary graft dysfunction, initially thought to be immune-mediated due to positive B-cell flow (negative CDC cross match) and received plasmapharesis/IVIg x2 with improvement in LV function. His course has been complicated by bilateral IJ/subclavian thrombi and he has a persistent right sided pleural effusion as well as acute on chronic renal failure, thought to be related to Prograf.     His echocardiogram on 4/3 showed evidence of slight worsened LV strain, LVEF of 52%, and slightly diminished right ventricular systolic function. It was determined that he missed five doses of prednisone. RHC today showed elevated biventricular filling pressures with normal cardiac output. Biopsy showed 1R/1A cellular rejection. C4D continues to be diffusely positive. DSA is unremarkable. His tacrolimus level is within range today, but has fluctuated significantly.     Please call me with questions at [phone]. Plan discussed with Dr. Parker and 2 Gonzalo NP team.

## 2018-04-07 NOTE — PROGRESS NOTE ADULT - ASSESSMENT
67 year old man with ACC/AHA stage D chronic systolic heart failure due to NICM s/p HM2 LVAD 6/17 c/b pump thrombosis now s/p heart transplant on 2/23 (CMV D-/R+ and Toxo D-/R+), with post-op course c/b primary graft dysfunction, initially thought to be immune-mediated due to positive B-cell flow (negative CDC cross match) and received plasmapharesis/IVIg x2 with improvement in LV function. His course has been complicated by bilateral IJ/subclavian thrombi. He currently has acute renal failure of unclear etiology. However, he does not have clear evidence of allograft dysfunction or low output on exam. 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     # Immunosuppression:  Tacrolimus -  Will continue dosing at 9 mg BID (increased yesterday). Tacro level 7 this morning from 9.4 Target trough level of 12-14. - - cardizem  240 qd  CellCept - continue 1000 mg PO BID.   Steroids - will give short pulse of oral steroids. Will give 50 mg PO BID for three days with subsequent taper.  Taper starting 3/24 45 mg x2 the 40 x2  decrease by 10  until 15 mg bid- ordered    # Antimicrobial prophylaxis:  PCP - continue Mepron 1500mg PO daily      # Bilateral IJ/Subclavian thrombi - unchanged on f/u U/S 3/12  -   - CHERIE negative    #Acute renal failure, possibly related to vancomycin dosing, improving.   - Vanco stopped.   - No further diuresis.     # CAV PPx  - Continue ECASA 81mg PO daily  - Continue pravastatin 20mg PO QHS    # ID   - Pseudomonas in sputum - course of cefepime completed 3/15  - DLES improved and vancomycin stopped given high trough with NORMAN. Per ID, will redose with daptomycin depending upon repeat vanc level this morning.   - silver sulfadizaine dressings per plastics for R 3rd digit.   - HCV viral load and PCR per protocol. Therapy to be initiated as outpatient by Dr. Thomas (Hepatology)  - Transplant ID following      3/26 Pt for repeat UE thrombus as per HF, r/o propagation  Creat is improving may need medications adjustment  Ambulate  3/27 Biopsy now scheduled for Wednesday. April 4    UE duplex with new innominate vein partial thrombus, cont anticoagulation, now therapeutic. D/w Dr Tavarez     3/28 prograf increased to 9mg bid.  Renal function continues to improve  Will resume hep sq tonight after biopsy this am  3/29 - Prograf 0.5mg po x 1 stat & increase Prograf to 9.5mg po bid  increase CardizemCD to 300mg po daily - received CardizemCD 240 this am therefore Cardizem 60mg po x 1 given this am  CXR w/ RLL haziness - will ultrasound  R LL today  36/30 Hyperkalemia - kayexalate 30 x1  Na bicarb added  Creat improved, now on lovenox  3/31   feeling well today,  VSS, TAC level 10.0, education by transplant coordinator with family, potassium stable  5.0,   cardizem to 360 Qd  4/1    VSS   5 beats wct in NSR,    LAVD site with VAC,   WBC   16.   afebrile,   TAC level   10.1  4/2 Planning on d/c home Tuesday, vac reordered.  Tacro lvl 12 on 10 bid .  Plastics f/u ,  cont silvadene , f/u outpatient.;  4/5 - Rounds made with Dr. Stahl:   tacro level - 22.4 - will hold prograf this evening - & decrease dose to 9mg po bid starting 4/6 IV lasix 40mg x 1;  - with increase tac level - will decrease Cardizem CD to 240mg po daily  4/6 prograf level: 14.4 this am- d/w Dr. Stahl- prograf 5 mg given this am then 12 mg this evening; prograf 9 bid to start in am sat 4/7; VSS;  rt pleural eff- sono site; minimal fluid as per CTU Pa/NP; driveline vac changed today   4/7 GFR improved. Heparin BID changed to Lovenox bid. Magnesium oxide decreased to  once daily. Valcyte 450mg increased QD  Discharge planning - Home next week

## 2018-04-07 NOTE — PROGRESS NOTE ADULT - PROBLEM SELECTOR PLAN 5
Acute increase in creatinine likely related to calcineurin inhibitor effect. Change in tacro dose as above.  He continues on bicarbonate and magnesium supplements, the latter of which we will increase to 1200 mg PO BID.

## 2018-04-08 LAB
ANION GAP SERPL CALC-SCNC: 11 MMOL/L — SIGNIFICANT CHANGE UP (ref 5–17)
BUN SERPL-MCNC: 41 MG/DL — HIGH (ref 7–23)
CALCIUM SERPL-MCNC: 9.1 MG/DL — SIGNIFICANT CHANGE UP (ref 8.4–10.5)
CHLORIDE SERPL-SCNC: 107 MMOL/L — SIGNIFICANT CHANGE UP (ref 96–108)
CO2 SERPL-SCNC: 24 MMOL/L — SIGNIFICANT CHANGE UP (ref 22–31)
CREAT SERPL-MCNC: 1.51 MG/DL — HIGH (ref 0.5–1.3)
GLUCOSE BLDC GLUCOMTR-MCNC: 118 MG/DL — HIGH (ref 70–99)
GLUCOSE BLDC GLUCOMTR-MCNC: 128 MG/DL — HIGH (ref 70–99)
GLUCOSE BLDC GLUCOMTR-MCNC: 139 MG/DL — HIGH (ref 70–99)
GLUCOSE BLDC GLUCOMTR-MCNC: 181 MG/DL — HIGH (ref 70–99)
GLUCOSE SERPL-MCNC: 128 MG/DL — HIGH (ref 70–99)
HCT VFR BLD CALC: 28.7 % — LOW (ref 39–50)
HGB BLD-MCNC: 8.8 G/DL — LOW (ref 13–17)
MAGNESIUM SERPL-MCNC: 2.4 MG/DL — SIGNIFICANT CHANGE UP (ref 1.6–2.6)
MCHC RBC-ENTMCNC: 28.8 PG — SIGNIFICANT CHANGE UP (ref 27–34)
MCHC RBC-ENTMCNC: 30.7 GM/DL — LOW (ref 32–36)
MCV RBC AUTO: 93.5 FL — SIGNIFICANT CHANGE UP (ref 80–100)
PLATELET # BLD AUTO: 311 K/UL — SIGNIFICANT CHANGE UP (ref 150–400)
POTASSIUM SERPL-MCNC: 5.8 MMOL/L — HIGH (ref 3.5–5.3)
POTASSIUM SERPL-SCNC: 5.8 MMOL/L — HIGH (ref 3.5–5.3)
RBC # BLD: 3.07 M/UL — LOW (ref 4.2–5.8)
RBC # FLD: 19.1 % — HIGH (ref 10.3–14.5)
SODIUM SERPL-SCNC: 142 MMOL/L — SIGNIFICANT CHANGE UP (ref 135–145)
TACROLIMUS SERPL-MCNC: 11.9 NG/ML — SIGNIFICANT CHANGE UP
WBC # BLD: 15.3 K/UL — HIGH (ref 3.8–10.5)
WBC # FLD AUTO: 15.3 K/UL — HIGH (ref 3.8–10.5)

## 2018-04-08 PROCEDURE — 71045 X-RAY EXAM CHEST 1 VIEW: CPT | Mod: 26

## 2018-04-08 PROCEDURE — 99233 SBSQ HOSP IP/OBS HIGH 50: CPT

## 2018-04-08 RX ORDER — SODIUM POLYSTYRENE SULFONATE 4.1 MEQ/G
15 POWDER, FOR SUSPENSION ORAL ONCE
Qty: 0 | Refills: 0 | Status: COMPLETED | OUTPATIENT
Start: 2018-04-08 | End: 2018-04-08

## 2018-04-08 RX ADMIN — Medication 81 MILLIGRAM(S): at 11:26

## 2018-04-08 RX ADMIN — Medication 500000 UNIT(S): at 11:26

## 2018-04-08 RX ADMIN — Medication 20 MILLIGRAM(S): at 22:45

## 2018-04-08 RX ADMIN — Medication 3 MILLILITER(S): at 11:42

## 2018-04-08 RX ADMIN — ENOXAPARIN SODIUM 70 MILLIGRAM(S): 100 INJECTION SUBCUTANEOUS at 22:45

## 2018-04-08 RX ADMIN — Medication 3 MILLILITER(S): at 06:15

## 2018-04-08 RX ADMIN — MAGNESIUM OXIDE 400 MG ORAL TABLET 1200 MILLIGRAM(S): 241.3 TABLET ORAL at 08:19

## 2018-04-08 RX ADMIN — Medication 1 APPLICATION(S): at 06:16

## 2018-04-08 RX ADMIN — TACROLIMUS 9 MILLIGRAM(S): 5 CAPSULE ORAL at 08:20

## 2018-04-08 RX ADMIN — Medication 15 MILLIGRAM(S): at 20:01

## 2018-04-08 RX ADMIN — Medication 500000 UNIT(S): at 06:16

## 2018-04-08 RX ADMIN — MYCOPHENOLATE MOFETIL 1000 MILLIGRAM(S): 250 CAPSULE ORAL at 08:19

## 2018-04-08 RX ADMIN — SODIUM POLYSTYRENE SULFONATE 15 GRAM(S): 4.1 POWDER, FOR SUSPENSION ORAL at 15:47

## 2018-04-08 RX ADMIN — FAMOTIDINE 20 MILLIGRAM(S): 10 INJECTION INTRAVENOUS at 11:28

## 2018-04-08 RX ADMIN — ENOXAPARIN SODIUM 70 MILLIGRAM(S): 100 INJECTION SUBCUTANEOUS at 11:26

## 2018-04-08 RX ADMIN — VALGANCICLOVIR 450 MILLIGRAM(S): 450 TABLET, FILM COATED ORAL at 08:20

## 2018-04-08 RX ADMIN — Medication 500000 UNIT(S): at 00:10

## 2018-04-08 RX ADMIN — Medication 1 APPLICATION(S): at 17:41

## 2018-04-08 RX ADMIN — Medication 650 MILLIGRAM(S): at 13:24

## 2018-04-08 RX ADMIN — Medication 500000 UNIT(S): at 17:41

## 2018-04-08 RX ADMIN — Medication 15 MILLIGRAM(S): at 08:19

## 2018-04-08 RX ADMIN — TACROLIMUS 9 MILLIGRAM(S): 5 CAPSULE ORAL at 20:01

## 2018-04-08 RX ADMIN — Medication 650 MILLIGRAM(S): at 22:45

## 2018-04-08 RX ADMIN — ATOVAQUONE 1500 MILLIGRAM(S): 750 SUSPENSION ORAL at 11:28

## 2018-04-08 RX ADMIN — Medication 3 MILLILITER(S): at 17:41

## 2018-04-08 RX ADMIN — Medication 2: at 11:40

## 2018-04-08 RX ADMIN — Medication 240 MILLIGRAM(S): at 06:16

## 2018-04-08 RX ADMIN — Medication 3 MILLILITER(S): at 00:10

## 2018-04-08 RX ADMIN — Medication 650 MILLIGRAM(S): at 06:16

## 2018-04-08 RX ADMIN — Medication 1 TABLET(S): at 11:26

## 2018-04-08 RX ADMIN — MYCOPHENOLATE MOFETIL 1000 MILLIGRAM(S): 250 CAPSULE ORAL at 20:01

## 2018-04-08 NOTE — PROGRESS NOTE ADULT - ASSESSMENT
Mr. Baez is a 67 year old man with ACC/AHA stage D chronic systolic heart failure due to NICM s/p HM2 LVAD 6/17 c/b pump thrombosis now s/p heart transplant on 2/23 (CMV D-/R+ and Toxo D-/R+), with post-op course c/b primary graft dysfunction, initially thought to be immune-mediated due to positive B-cell flow (negative CDC cross match) and received plasmapharesis/IVIg x2 with improvement in LV function. His course has been complicated by bilateral IJ/subclavian thrombi and he has a persistent small  right sided pleural effusion as well as chronic renal failure, thought to be related to Prograf.     His echocardiogram on 4/3 showed evidence of slight worsened LV strain, LVEF of 52%, and slightly diminished right ventricular systolic function. It was determined that he missed five doses of prednisone. RHC on 4/4 showed elevated biventricular filling pressures with normal cardiac output. Biopsy showed 1R/1A cellular rejection. C4D continues to be diffusely positive. DSA is unremarkable. His tacrolimus level is within range today, but has fluctuated significantly. We are aiming for a slightly lower goal level (10-12) given his significant renal dysfunction.     Please call me with questions at [phone]. Plan discussed with Dr. Parker and 2 [facility] NP team.

## 2018-04-08 NOTE — PROGRESS NOTE ADULT - ASSESSMENT
67M ACC/AHA stage D chronic systolic heart failure due to NICM s/p HM2 LVAD 6/17 c/b pump thrombosis now s/p heart transplant on 2/23 (CMV D-/R+ and Toxo D-/R+), with post-op course c/b primary graft dysfunction, initially thought to be immune-mediated due to positive B-cell flow (negative CDC cross match) and received plasmapharesis/IVIg x2 with improvement in LV function. His course has been complicated by bilateral IJ/subclavian thrombi. He currently has acute renal failure of unclear etiology. However, he does not have clear evidence of allograft dysfunction or low output on exam. 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     # Immunosuppression:  Tacrolimus -  Will continue dosing at 9 mg BID (increased yesterday). Tacro level 7 this morning from 11.9 Target trough level of 12-14. - - cardizem  240 qd  CellCept - continue 1000 mg PO BID.   Steroids - will give short pulse of oral steroids. Will give 50 mg PO BID for three days with subsequent taper.  Taper starting 3/24 45 mg x2 the 40 x2  decrease by 10  until 15 mg bid- ordered    # Antimicrobial prophylaxis:  PCP - continue Mepron 1500mg PO daily      # Bilateral IJ/Subclavian thrombi - unchanged on f/u U/S 3/12  -   - CHERIE negative    #Acute renal failure, possibly related to vancomycin dosing, improving.   - Vanco stopped.   - No further diuresis.     # CAV PPx  - Continue ECASA 81mg PO daily  - Continue pravastatin 20mg PO QHS    # ID   - Pseudomonas in sputum - course of cefepime completed 3/15  - DLES improved and vancomycin stopped given high trough with NORMAN. Per ID, will redose with daptomycin depending upon repeat vanc level this morning.   - silver sulfadizaine dressings per plastics for R 3rd digit.   - HCV viral load and PCR per protocol. Therapy to be initiated as outpatient by Dr. Thomas (Hepatology)  - Transplant ID following      3/26 Pt for repeat UE thrombus as per HF, r/o propagation  Creat is improving may need medications adjustment  Ambulate  3/27 Biopsy now scheduled for Wednesday. April 4    UE duplex with new innominate vein partial thrombus, cont anticoagulation, now therapeutic. D/w Dr Tavarez     3/28 prograf increased to 9mg bid.  Renal function continues to improve  Will resume hep sq tonight after biopsy this am  3/29 - Prograf 0.5mg po x 1 stat & increase Prograf to 9.5mg po bid  increase CardizemCD to 300mg po daily - received CardizemCD 240 this am therefore Cardizem 60mg po x 1 given this am  CXR w/ RLL haziness - will ultrasound  R LL today  36/30 Hyperkalemia - kayexalate 30 x1  Na bicarb added  Creat improved, now on lovenox  3/31   feeling well today,  VSS, TAC level 10.0, education by transplant coordinator with family, potassium stable  5.0,   cardizem to 360 Qd  4/1    VSS   5 beats wct in NSR,    LAVD site with VAC,   WBC   16.   afebrile,   TAC level   10.1  4/2 Planning on d/c home Tuesday, vac reordered.  Tacro lvl 12 on 10 bid .  Plastics f/u ,  cont silvadene , f/u outpatient.;  4/5 - Rounds made with Dr. Stahl:   tacro level - 22.4 - will hold prograf this evening - & decrease dose to 9mg po bid starting 4/6 IV lasix 40mg x 1;  - with increase tac level - will decrease Cardizem CD to 240mg po daily  4/6 prograf level: 14.4 this am- d/w Dr. Sthal- prograf 5 mg given this am then 12 mg this evening; prograf 9 bid to start in am sat 4/7; VSS;  rt pleural eff- sono site; minimal fluid as per CTU Pa/NP; driveline vac changed today   4/7 GFR improved. Heparin BID changed to Lovenox bid. Magnesium oxide decreased to  once daily. Valcyte 450mg increased QD  4/8 SR  - possible d/c home Monday as per transplant team.

## 2018-04-08 NOTE — PROGRESS NOTE ADULT - SUBJECTIVE AND OBJECTIVE BOX
Subjective: No overnight events. He feels well. No cough. No shortness of breath.     Medications:  acetaminophen   Tablet. 650 milliGRAM(s) Oral every 6 hours PRN  ALBUTerol/ipratropium for Nebulization 3 milliLiter(s) Nebulizer every 6 hours  aspirin enteric coated 81 milliGRAM(s) Oral daily  atovaquone Suspension 1500 milliGRAM(s) Oral daily  Calcium Citrate + Vit D, 315 mg/200 Unit 2 Tablet(s) 2 Tablet(s) Oral two times a day  diltiazem    milliGRAM(s) Oral daily  docusate sodium 100 milliGRAM(s) Oral three times a day  enoxaparin Injectable 70 milliGRAM(s) SubCutaneous <User Schedule>  famotidine    Tablet 20 milliGRAM(s) Oral daily  insulin lispro (HumaLOG) corrective regimen sliding scale   SubCutaneous three times a day before meals  insulin lispro (HumaLOG) corrective regimen sliding scale   SubCutaneous at bedtime  lidocaine 2% Injectable 20 milliLiter(s) Local Injection once  magnesium oxide 1200 milliGRAM(s) Oral <User Schedule>  multivitamin 1 Tablet(s) Oral daily  mycophenolate mofetil 1000 milliGRAM(s) Oral <User Schedule>  nystatin    Suspension 142175 Unit(s) Oral every 6 hours  pravastatin 20 milliGRAM(s) Oral at bedtime  predniSONE   Tablet 15 milliGRAM(s) Oral <User Schedule>  silver sulfADIAZINE 1% Cream 1 Application(s) Topical two times a day  sodium bicarbonate 650 milliGRAM(s) Oral three times a day  tacrolimus 9 milliGRAM(s) Oral <User Schedule>  valGANciclovir 450 milliGRAM(s) Oral <User Schedule>    Physical Exam:    Vitals:  T(C): 36.3 (18 @ 07:42), Max: 36.7 (18 @ 03:33)  HR: 105 (18 @ 07:42) (99 - 105)  BP: 135/95 (18 @ 07:42) (115/76 - 139/89)  BP(mean): 109 (18 @ 03:33) (92 - 109)  RR: 18 (18 @ 07:42) (18 - 18)  SpO2: 97% (18 @ 03:33) (97% - 98%)     Daily Weight in k.1 (2018 07:42)    I&O's Summary    2018 07:01  -  2018 07:00  --------------------------------------------------------  IN: 560 mL / OUT: 1400 mL / NET: -840 mL    2018 07:  -  2018 14:09  --------------------------------------------------------  IN: 360 mL / OUT: 200 mL / NET: 160 mL    General: No distress. Comfortable.  HEENT: EOM intact.  Neck: Neck supple. JVP not elevated. No masses  Chest: Clear to auscultation bilaterally  CV: Normal S1 and S2. No murmurs, rub, or gallops. Radial pulses normal.  Abdomen: Soft, non-distended, non-tender  Skin: No rashes or skin breakdown  Neurology: Alert and oriented times three. Sensation intact  Psych: Affect normal    Labs:                        8.8    15.3  )-----------( 311      ( 2018 07:44 )             28.7     04-08    142  |  107  |  41<H>  ----------------------------<  128<H>  5.8<H>   |  24  |  1.51<H>    Ca    9.1      2018 07:44  Mg     2.4     04-08    PTT - ( 2018 07:35 )  PTT:42.2 sec

## 2018-04-08 NOTE — PROGRESS NOTE ADULT - PROBLEM SELECTOR PLAN 3
On ultrasound, this does not appear large. We will continue to monitor. Appearance may be related to elevated right diaphragm.

## 2018-04-08 NOTE — PROGRESS NOTE ADULT - SUBJECTIVE AND OBJECTIVE BOX
VITAL SIGNS-Tele: SR    Vital Signs Last 24 Hrs  T(C): 36.3 (18 @ 07:42), Max: 36.7 (18 @ 03:33)  HR: 105 (18 @ 07:42) (99 - 105)  BP: 135/95 (18 @ 07:42) (115/76 - 139/89)  SpO2: 97% (18 @ 03:33) (97% - 98%)          @ 07:01  -   @ 07:00  --------------------------------------------------------  IN: 560 mL / OUT: 1400 mL / NET: -840 mL     @ 07:01  -   @ 14:00  --------------------------------------------------------  IN: 360 mL / OUT: 200 mL / NET: 160 mL    Daily     Daily Weight in k.1 (2018 07:42)    CAPILLARY BLOOD GLUCOSE  POCT Blood Glucose.: 181 mg/dL (2018 11:34)  POCT Blood Glucose.: 118 mg/dL (2018 07:45)  POCT Blood Glucose.: 133 mg/dL (2018 22:21)  POCT Blood Glucose.: 201 mg/dL (2018 19:31)     Coumadin    [ ] YES          [ x ]      NO         Reason:       PHYSICAL EXAM:  Neurology: alert and oriented x 3, nonfocal, no gross deficits  CV : S1S2  Sternal Wound :  CDI , Stable  Lungs: CTA  Abdomen: soft, nontender, nondistended, positive bowel sounds, last bowel movement         Extremities:     no edema, no calf tenderness    acetaminophen   Tablet. 650 milliGRAM(s) Oral every 6 hours PRN  ALBUTerol/ipratropium for Nebulization 3 milliLiter(s) Nebulizer every 6 hours  aspirin enteric coated 81 milliGRAM(s) Oral daily  atovaquone Suspension 1500 milliGRAM(s) Oral daily  Calcium Citrate + Vit D, 315 mg/200 Unit 2 Tablet(s) 2 Tablet(s) Oral two times a day  diltiazem    milliGRAM(s) Oral daily  docusate sodium 100 milliGRAM(s) Oral three times a day  enoxaparin Injectable 70 milliGRAM(s) SubCutaneous <User Schedule>  famotidine    Tablet 20 milliGRAM(s) Oral daily  lidocaine 2% Injectable 20 milliLiter(s) Local Injection once  magnesium oxide 1200 milliGRAM(s) Oral <User Schedule>  multivitamin 1 Tablet(s) Oral daily  mycophenolate mofetil 1000 milliGRAM(s) Oral <User Schedule>  nystatin    Suspension 188437 Unit(s) Oral every 6 hours  pravastatin 20 milliGRAM(s) Oral at bedtime  predniSONE   Tablet 15 milliGRAM(s) Oral <User Schedule>  silver sulfADIAZINE 1% Cream 1 Application(s) Topical two times a day  sodium bicarbonate 650 milliGRAM(s) Oral three times a day  tacrolimus 9 milliGRAM(s) Oral <User Schedule>  valGANciclovir 450 milliGRAM(s) Oral <User Schedule>    Physical Therapy Rec:   Home  [  ]   Home w/ PT  [ x ]  Rehab  [  ]  Discussed with Cardiothoracic Team at AM rounds.

## 2018-04-08 NOTE — PROGRESS NOTE ADULT - PROBLEM SELECTOR PLAN 2
Continue prednisone at 15 mg PO BID.   Continue CellCept 1000 mg BID.  We will continue tacrolimus 9 mg PO BID. We will continue the diltiazem at reduced dose of 240 mg daily.    We will continue on aspirin and Pravachol.

## 2018-04-08 NOTE — PROGRESS NOTE ADULT - PROBLEM SELECTOR PLAN 1
continue meds as per transplant team  prograf level this am- 11.9- d/w Dr. Stahl- prograf 9mg given this am then this evening; then prograf 9mg bid   next cardiac biopsy 4/18

## 2018-04-08 NOTE — PROGRESS NOTE ADULT - PROBLEM SELECTOR PLAN 5
Likely due calcineurin inhibitor effect. Change in tacro dose as above.  He continues on bicarbonate and magnesium supplements  His potassium is high. We will give Kayexelate. We will discuss with renal use of Veltassa.

## 2018-04-09 ENCOUNTER — APPOINTMENT (OUTPATIENT)
Dept: ELECTROPHYSIOLOGY | Facility: CLINIC | Age: 68
End: 2018-04-09

## 2018-04-09 LAB
ALBUMIN SERPL ELPH-MCNC: 3.7 G/DL — SIGNIFICANT CHANGE UP (ref 3.3–5)
ALP SERPL-CCNC: 91 U/L — SIGNIFICANT CHANGE UP (ref 40–120)
ALT FLD-CCNC: 46 U/L RC — HIGH (ref 10–45)
ANION GAP SERPL CALC-SCNC: 13 MMOL/L — SIGNIFICANT CHANGE UP (ref 5–17)
AST SERPL-CCNC: 26 U/L — SIGNIFICANT CHANGE UP (ref 10–40)
BILIRUB SERPL-MCNC: 0.5 MG/DL — SIGNIFICANT CHANGE UP (ref 0.2–1.2)
BUN SERPL-MCNC: 33 MG/DL — HIGH (ref 7–23)
CALCIUM SERPL-MCNC: 9.3 MG/DL — SIGNIFICANT CHANGE UP (ref 8.4–10.5)
CHLORIDE SERPL-SCNC: 106 MMOL/L — SIGNIFICANT CHANGE UP (ref 96–108)
CMV DNA CSF QL NAA+PROBE: SIGNIFICANT CHANGE UP
CO2 SERPL-SCNC: 22 MMOL/L — SIGNIFICANT CHANGE UP (ref 22–31)
CREAT SERPL-MCNC: 1.31 MG/DL — HIGH (ref 0.5–1.3)
GLUCOSE BLDC GLUCOMTR-MCNC: 118 MG/DL — HIGH (ref 70–99)
GLUCOSE BLDC GLUCOMTR-MCNC: 126 MG/DL — HIGH (ref 70–99)
GLUCOSE BLDC GLUCOMTR-MCNC: 134 MG/DL — HIGH (ref 70–99)
GLUCOSE BLDC GLUCOMTR-MCNC: 145 MG/DL — HIGH (ref 70–99)
GLUCOSE SERPL-MCNC: 118 MG/DL — HIGH (ref 70–99)
HCT VFR BLD CALC: 29.9 % — LOW (ref 39–50)
HGB BLD-MCNC: 9.3 G/DL — LOW (ref 13–17)
MAGNESIUM SERPL-MCNC: 2.2 MG/DL — SIGNIFICANT CHANGE UP (ref 1.6–2.6)
MCHC RBC-ENTMCNC: 29.1 PG — SIGNIFICANT CHANGE UP (ref 27–34)
MCHC RBC-ENTMCNC: 31 GM/DL — LOW (ref 32–36)
MCV RBC AUTO: 93.9 FL — SIGNIFICANT CHANGE UP (ref 80–100)
PLATELET # BLD AUTO: 315 K/UL — SIGNIFICANT CHANGE UP (ref 150–400)
POTASSIUM SERPL-MCNC: 5.2 MMOL/L — SIGNIFICANT CHANGE UP (ref 3.5–5.3)
POTASSIUM SERPL-MCNC: 5.5 MMOL/L — HIGH (ref 3.5–5.3)
POTASSIUM SERPL-SCNC: 5.2 MMOL/L — SIGNIFICANT CHANGE UP (ref 3.5–5.3)
POTASSIUM SERPL-SCNC: 5.5 MMOL/L — HIGH (ref 3.5–5.3)
PROT SERPL-MCNC: 7 G/DL — SIGNIFICANT CHANGE UP (ref 6–8.3)
RBC # BLD: 3.19 M/UL — LOW (ref 4.2–5.8)
RBC # FLD: 18.9 % — HIGH (ref 10.3–14.5)
SODIUM SERPL-SCNC: 141 MMOL/L — SIGNIFICANT CHANGE UP (ref 135–145)
TACROLIMUS SERPL-MCNC: 10 NG/ML — SIGNIFICANT CHANGE UP
WBC # BLD: 13 K/UL — HIGH (ref 3.8–10.5)
WBC # FLD AUTO: 13 K/UL — HIGH (ref 3.8–10.5)

## 2018-04-09 PROCEDURE — 99232 SBSQ HOSP IP/OBS MODERATE 35: CPT

## 2018-04-09 PROCEDURE — 99233 SBSQ HOSP IP/OBS HIGH 50: CPT

## 2018-04-09 PROCEDURE — 71045 X-RAY EXAM CHEST 1 VIEW: CPT | Mod: 26

## 2018-04-09 PROCEDURE — 90834 PSYTX W PT 45 MINUTES: CPT

## 2018-04-09 RX ORDER — SODIUM POLYSTYRENE SULFONATE 4.1 MEQ/G
30 POWDER, FOR SUSPENSION ORAL ONCE
Qty: 0 | Refills: 0 | Status: COMPLETED | OUTPATIENT
Start: 2018-04-09 | End: 2018-04-09

## 2018-04-09 RX ORDER — TACROLIMUS 5 MG/1
9 CAPSULE ORAL
Qty: 0 | Refills: 0 | Status: DISCONTINUED | OUTPATIENT
Start: 2018-04-09 | End: 2018-04-10

## 2018-04-09 RX ORDER — TACROLIMUS 5 MG/1
10 CAPSULE ORAL
Qty: 0 | Refills: 0 | Status: DISCONTINUED | OUTPATIENT
Start: 2018-04-09 | End: 2018-04-10

## 2018-04-09 RX ADMIN — MYCOPHENOLATE MOFETIL 1000 MILLIGRAM(S): 250 CAPSULE ORAL at 07:57

## 2018-04-09 RX ADMIN — Medication 650 MILLIGRAM(S): at 05:45

## 2018-04-09 RX ADMIN — Medication 1 APPLICATION(S): at 18:11

## 2018-04-09 RX ADMIN — Medication 1 APPLICATION(S): at 05:45

## 2018-04-09 RX ADMIN — Medication 3 MILLILITER(S): at 00:28

## 2018-04-09 RX ADMIN — ENOXAPARIN SODIUM 70 MILLIGRAM(S): 100 INJECTION SUBCUTANEOUS at 21:34

## 2018-04-09 RX ADMIN — Medication 650 MILLIGRAM(S): at 08:26

## 2018-04-09 RX ADMIN — Medication 20 MILLIGRAM(S): at 21:34

## 2018-04-09 RX ADMIN — VALGANCICLOVIR 450 MILLIGRAM(S): 450 TABLET, FILM COATED ORAL at 07:55

## 2018-04-09 RX ADMIN — SODIUM POLYSTYRENE SULFONATE 30 GRAM(S): 4.1 POWDER, FOR SUSPENSION ORAL at 11:29

## 2018-04-09 RX ADMIN — Medication 650 MILLIGRAM(S): at 21:34

## 2018-04-09 RX ADMIN — Medication 3 MILLILITER(S): at 18:10

## 2018-04-09 RX ADMIN — Medication 500000 UNIT(S): at 00:28

## 2018-04-09 RX ADMIN — Medication 500000 UNIT(S): at 23:01

## 2018-04-09 RX ADMIN — Medication 81 MILLIGRAM(S): at 11:30

## 2018-04-09 RX ADMIN — FAMOTIDINE 20 MILLIGRAM(S): 10 INJECTION INTRAVENOUS at 11:31

## 2018-04-09 RX ADMIN — Medication 500000 UNIT(S): at 18:11

## 2018-04-09 RX ADMIN — Medication 3 MILLILITER(S): at 05:45

## 2018-04-09 RX ADMIN — Medication 500000 UNIT(S): at 11:30

## 2018-04-09 RX ADMIN — TACROLIMUS 10 MILLIGRAM(S): 5 CAPSULE ORAL at 20:01

## 2018-04-09 RX ADMIN — MYCOPHENOLATE MOFETIL 1000 MILLIGRAM(S): 250 CAPSULE ORAL at 20:01

## 2018-04-09 RX ADMIN — Medication 500000 UNIT(S): at 05:45

## 2018-04-09 RX ADMIN — Medication 3 MILLILITER(S): at 11:30

## 2018-04-09 RX ADMIN — Medication 1 TABLET(S): at 11:31

## 2018-04-09 RX ADMIN — TACROLIMUS 9 MILLIGRAM(S): 5 CAPSULE ORAL at 07:54

## 2018-04-09 RX ADMIN — Medication 15 MILLIGRAM(S): at 07:56

## 2018-04-09 RX ADMIN — Medication 15 MILLIGRAM(S): at 20:01

## 2018-04-09 RX ADMIN — ENOXAPARIN SODIUM 70 MILLIGRAM(S): 100 INJECTION SUBCUTANEOUS at 11:29

## 2018-04-09 RX ADMIN — Medication 3 MILLILITER(S): at 23:01

## 2018-04-09 RX ADMIN — Medication 240 MILLIGRAM(S): at 05:45

## 2018-04-09 RX ADMIN — MAGNESIUM OXIDE 400 MG ORAL TABLET 1200 MILLIGRAM(S): 241.3 TABLET ORAL at 07:56

## 2018-04-09 RX ADMIN — Medication 650 MILLIGRAM(S): at 13:49

## 2018-04-09 RX ADMIN — ATOVAQUONE 1500 MILLIGRAM(S): 750 SUSPENSION ORAL at 11:30

## 2018-04-09 RX ADMIN — Medication 650 MILLIGRAM(S): at 07:56

## 2018-04-09 NOTE — PROGRESS NOTE ADULT - SUBJECTIVE AND OBJECTIVE BOX
Subjective:  -No acute events overnight  -Reports feeling good without complaints of chest pain, palpitations, shortness of breath, abdominal discomfort or nausea   -Ambulated to and from the bathroom today without LOBATO     Medications:  acetaminophen   Tablet. 650 milliGRAM(s) Oral every 6 hours PRN  ALBUTerol/ipratropium for Nebulization 3 milliLiter(s) Nebulizer every 6 hours  aspirin enteric coated 81 milliGRAM(s) Oral daily  atovaquone Suspension 1500 milliGRAM(s) Oral daily  Calcium Citrate + Vit D, 315 mg/200 Unit 2 Tablet(s) 2 Tablet(s) Oral two times a day  diltiazem    milliGRAM(s) Oral daily  docusate sodium 100 milliGRAM(s) Oral three times a day  enoxaparin Injectable 70 milliGRAM(s) SubCutaneous <User Schedule>  famotidine    Tablet 20 milliGRAM(s) Oral daily  insulin lispro (HumaLOG) corrective regimen sliding scale   SubCutaneous three times a day before meals  insulin lispro (HumaLOG) corrective regimen sliding scale   SubCutaneous at bedtime  lidocaine 2% Injectable 20 milliLiter(s) Local Injection once  magnesium oxide 1200 milliGRAM(s) Oral <User Schedule>  multivitamin 1 Tablet(s) Oral daily  mycophenolate mofetil 1000 milliGRAM(s) Oral <User Schedule>  nystatin    Suspension 124677 Unit(s) Oral every 6 hours  pravastatin 20 milliGRAM(s) Oral at bedtime  predniSONE   Tablet 15 milliGRAM(s) Oral <User Schedule>  silver sulfADIAZINE 1% Cream 1 Application(s) Topical two times a day  sodium bicarbonate 650 milliGRAM(s) Oral three times a day  tacrolimus 9 milliGRAM(s) Oral <User Schedule>  valGANciclovir 450 milliGRAM(s) Oral <User Schedule>      Physical Exam:    Vital Signs Last 24 Hrs  T(C): 36.7 (2018 11:35), Max: 36.8 (2018 19:39)  T(F): 98 (2018 11:35), Max: 98.2 (2018 19:39)  HR: 99 (2018 11:35) (96 - 103)  BP: 128/87 (2018 11:35) (120/85 - 138/92)  BP(mean): 111 (2018 03:30) (105 - 111)  RR: 18 (2018 11:35) (17 - 18)  SpO2: 99% (2018 11:35) (96% - 99%)    Daily     Daily Weight in k.5 (2018 05:10)    I&O's Summary    2018 07:  -  2018 07:00  --------------------------------------------------------  IN: 830 mL / OUT: 1200 mL / NET: -370 mL    2018 07:  -  2018 13:14  --------------------------------------------------------  IN: 780 mL / OUT: 550 mL / NET: 230 mL    General: No distress. Sitting in chair. Comfortable.  Neck: Neck supple. JVP not elevated.  Chest: Clear to auscultation bilaterally  CV: Normal S1 and S2. No murmurs, rub, or gallops. Radial pulses normal. No lower extremity edema.   Abdomen: Soft, non-distended, non-tender, +bowel sounds   Skin: Right abdominal wound vac site clean, dry, and intact. Right third digit dressing clean, dry, and intact  Neurology: Alert and oriented times three. Sensation intact  Psych: Affect normal    Labs:                        9.3    13.0  )-----------( 315      ( 2018 07:39 )             29.9         141  |  106  |  33<H>  ----------------------------<  118<H>  5.5<H>   |  22  |  1.31<H>    Ca    9.3      2018 07:39  Mg     2.2         TPro  7.0  /  Alb  3.7  /  TBili  0.5  /  DBili  x   /  AST  26  /  ALT  46<H>  /  AlkPhos  91

## 2018-04-09 NOTE — PROGRESS NOTE ADULT - PROBLEM SELECTOR PLAN 5
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   -Creatinine trending down   -He continues on bicarbonate and magnesium supplements  -Kayexelate given for hyperakalemia today.   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   -Creatinine trending down   -He continues on bicarbonate and magnesium supplements  -Kayexelate given for hyperakalemia today.   -Discuss with renal and pharmacy use of Veltassa for his chronic hyperkalemia  -low potassium diet

## 2018-04-09 NOTE — PROGRESS NOTE ADULT - SUBJECTIVE AND OBJECTIVE BOX
Behavioral Cardiology Progress Note     HPI:  Mr. Baez is a 67 year-old man, , only son . Owns house he lives in with his brother.  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  No PPH.     Current stressors:   Hospitalization   s/p Heart transplant (18)      Support system/family support: Good support from family and close friend      Coping strategies: Talking with family and staff, watching TV, his pastora, talking to his granddaughter     Understanding of medical illness and treatment plan:   Ongoing education on medication management provided by HT coordinator and other team members. Strategies being utilized to accommodate limited literacy (pictures of medication, frequent teach-back).  Good understanding of need for lifelong immunosuppressant medication, importance of calling HT coordinator.  Benefits from frequent review and teach-back of all education.     MSE:  Seen sitting in chair. A&Ox3.  Well-related but with poor eye contact. Talkative.  Thought process goal directed. No evidence of any psychosis, delusions, jona.  Mood "OK". Affect full range. Insight and judgment adequate.  Denied SI.

## 2018-04-09 NOTE — PROGRESS NOTE ADULT - PROBLEM SELECTOR PLAN 1
-Continue current steroid dose at prednisone 15 mg PO BID.   -DSA was negative. We will consider the possibility of non-HLA antibodies leading to the persistent C4D positivity given persistent allograft dysfunction.

## 2018-04-09 NOTE — PROGRESS NOTE ADULT - PROBLEM SELECTOR PLAN 2
-Continue prednisone at 15 mg PO BID.   -Continue CellCept 1000 mg BID.  -Adjust tacrolimus to 9 mg in the AM and 10mg in the PM. Continue the diltiazem at current dose of 240 mg daily.

## 2018-04-09 NOTE — PROGRESS NOTE ADULT - PROBLEM SELECTOR PLAN 5
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   -Creatinine trending down   -He continues on bicarbonate and magnesium supplements  -Kayexelate given for hyperakalemia today.   -Discuss with renal and pharmacy use of Veltassa for his chronic hyperkalemia  -low potassium diet

## 2018-04-09 NOTE — PROGRESS NOTE ADULT - SUBJECTIVE AND OBJECTIVE BOX
INFECTIOUS DISEASES FOLLOW UP-- Sujey Lujan  [phone]    This is a follow up note for this  67yMale with s/p orthotopic heart transplant  no interval events  tentative plans for discharge home in AM      ROS:  CONSTITUTIONAL:  No fever, good appetite  CARDIOVASCULAR:  No chest pain or palpitations  RESPIRATORY:  No dyspnea  GASTROINTESTINAL:  No nausea, vomiting, diarrhea, or abdominal pain  GENITOURINARY:  No dysuria  NEUROLOGIC:  No headache,     Allergies    No Known Allergies    Intolerances        ANTIBIOTICS/RELEVANT:  antimicrobials  atovaquone Suspension 1500 milliGRAM(s) Oral daily  nystatin    Suspension 744724 Unit(s) Oral every 6 hours  valGANciclovir 450 milliGRAM(s) Oral <User Schedule>    immunologic:  mycophenolate mofetil 1000 milliGRAM(s) Oral <User Schedule>  tacrolimus 9 milliGRAM(s) Oral <User Schedule>    OTHER:  acetaminophen   Tablet. 650 milliGRAM(s) Oral every 6 hours PRN  ALBUTerol/ipratropium for Nebulization 3 milliLiter(s) Nebulizer every 6 hours  aspirin enteric coated 81 milliGRAM(s) Oral daily  Calcium Citrate + Vit D, 315 mg/200 Unit 2 Tablet(s) 2 Tablet(s) Oral two times a day  diltiazem    milliGRAM(s) Oral daily  docusate sodium 100 milliGRAM(s) Oral three times a day  enoxaparin Injectable 70 milliGRAM(s) SubCutaneous <User Schedule>  famotidine    Tablet 20 milliGRAM(s) Oral daily  insulin lispro (HumaLOG) corrective regimen sliding scale   SubCutaneous three times a day before meals  insulin lispro (HumaLOG) corrective regimen sliding scale   SubCutaneous at bedtime  lidocaine 2% Injectable 20 milliLiter(s) Local Injection once  magnesium oxide 1200 milliGRAM(s) Oral <User Schedule>  multivitamin 1 Tablet(s) Oral daily  pravastatin 20 milliGRAM(s) Oral at bedtime  predniSONE   Tablet 15 milliGRAM(s) Oral <User Schedule>  silver sulfADIAZINE 1% Cream 1 Application(s) Topical two times a day  sodium bicarbonate 650 milliGRAM(s) Oral three times a day      Objective:  Vital Signs Last 24 Hrs  T(C): 36.7 (09 Apr 2018 15:29), Max: 36.8 (08 Apr 2018 19:39)  T(F): 98.1 (09 Apr 2018 15:29), Max: 98.2 (08 Apr 2018 19:39)  HR: 101 (09 Apr 2018 15:29) (96 - 103)  BP: 135/89 (09 Apr 2018 15:29) (120/85 - 138/92)  BP(mean): 111 (09 Apr 2018 03:30) (105 - 111)  RR: 18 (09 Apr 2018 15:29) (17 - 18)  SpO2: 100% (09 Apr 2018 15:29) (96% - 100%)    PHYSICAL EXAM:  Constitutional:no acute distress  Eyes:WALT, EOMI  Ear/Nose/Throat: no oral lesions, 	  Respiratory: clear BL  Cardiovascular: S1S2  Gastrointestinal:soft, (+) BS, no tenderness  Extremities:no e/e/c right middle finger with blackened tip unchanged  No Lymphadenopathy  IV sites not inflammed.    LABS:                        9.3    13.0  )-----------( 315      ( 09 Apr 2018 07:39 )             29.9     04-09    141  |  106  |  33<H>  ----------------------------<  118<H>  5.5<H>   |  22  |  1.31<H>    Ca    9.3      09 Apr 2018 07:39  Mg     2.2     04-09    TPro  7.0  /  Alb  3.7  /  TBili  0.5  /  DBili  x   /  AST  26  /  ALT  46<H>  /  AlkPhos  91  04-09          MICROBIOLOGY:    Cytomegalovirus By PCR (04.06.18 @ 21:57)    Cytomegalovirus By PCR:   NotDetec            RECENT CULTURES:      RADIOLOGY & ADDITIONAL STUDIES:

## 2018-04-09 NOTE — CHART NOTE - NSCHARTNOTEFT_GEN_A_CORE
Source: Patient [ x ]    Family [ ]     other [ x ] RN, Comprehensive chart review     Pt seen for nutrition follow up. Pt states he is feeling grumpy and expressed frustration with changing discharge date. Pt does report good appetite and eating 100% of meals, states he also drank Nepro. Reviewed low potassium diet guidelines as pt continues with hyperkalemia and discussed reasoning for Nepro vs Ensure Enlive due to potassium content. Also reviewed heart healthy diet, food/drug interaction, and food safety; pt somewhat receptive. Denies GI distress at this time.     Chart reviewed- s/p heart transplant on 2/23, post-op course c/b primary graft dysfunction and received plasmapharesis/IVIg x2 with improvement in LV function, bilateral IJ/subclavian thrombi; with increased lymphocytes seen on the fourth biopsy, s/p fifth biopsy 3/28; NORMAN-improving, hyperkalemia likely due to increasing prograf levels-resolving. Pt missed five doses of prednisone. Right heart cath showed elevated biventricular filling pressures with normal cardiac output. Worsening renal function. Pt does not have clear evidence of allograft dysfunction or low output on exam; biopsy is consistent with mild worsening in rejection per chart. Pt with hyperkalemia requiring kayexalate.      Diet : regular, low finer, no concentrated potassium, low sodium     Admission Weight: 78.9kg  Current Weight: 74.5kg  Weight fluctuations noted.     Pertinent Medications: MEDICATIONS  (STANDING):  ALBUTerol/ipratropium for Nebulization 3 milliLiter(s) Nebulizer every 6 hours  aspirin enteric coated 81 milliGRAM(s) Oral daily  atovaquone Suspension 1500 milliGRAM(s) Oral daily  Calcium Citrate + Vit D, 315 mg/200 Unit 2 Tablet(s) 2 Tablet(s) Oral two times a day  diltiazem    milliGRAM(s) Oral daily  docusate sodium 100 milliGRAM(s) Oral three times a day  enoxaparin Injectable 70 milliGRAM(s) SubCutaneous <User Schedule>  famotidine    Tablet 20 milliGRAM(s) Oral daily  insulin lispro (HumaLOG) corrective regimen sliding scale   SubCutaneous three times a day before meals  insulin lispro (HumaLOG) corrective regimen sliding scale   SubCutaneous at bedtime  lidocaine 2% Injectable 20 milliLiter(s) Local Injection once  magnesium oxide 1200 milliGRAM(s) Oral <User Schedule>  multivitamin 1 Tablet(s) Oral daily  mycophenolate mofetil 1000 milliGRAM(s) Oral <User Schedule>  nystatin    Suspension [id number] Unit(s) Oral every 6 hours  pravastatin 20 milliGRAM(s) Oral at bedtime  predniSONE   Tablet 15 milliGRAM(s) Oral <User Schedule>  silver sulfADIAZINE 1% Cream 1 Application(s) Topical two times a day  sodium bicarbonate 650 milliGRAM(s) Oral three times a day  tacrolimus 9 milliGRAM(s) Oral <User Schedule>  valGANciclovir 450 milliGRAM(s) Oral <User Schedule>    MEDICATIONS  (PRN):  acetaminophen   Tablet. 650 milliGRAM(s) Oral every 6 hours PRN Mild Pain (1 - 3)    Pertinent Labs:    Complete Blood Count (04.09.18 @ 07:39)    Hemoglobin: 9.3 g/dL - low    Hematocrit: 29.9 % - low  Comprehensive Metabolic Panel (04.09.18 @ 07:39)    Sodium, Serum: 141 mmol/L    Potassium, Serum: 5.5 mmol/L - high    Chloride, Serum: 106 mmol/L    Carbon Dioxide, Serum: 22 mmol/L    Anion Gap, Serum: 13 mmol/L    Blood Urea Nitrogen, Serum: 33 mg/dL - high    Creatinine, Serum: 1.31 mg/dL - high    Glucose, Serum: 118 mg/dL - high    Calcium, Total Serum: 9.3 mg/dL    Protein Total, Serum: 7.0 g/dL    Albumin, Serum: 3.7 g/dL    Bilirubin Total, Serum: 0.5 mg/dL    Alkaline Phosphatase, Serum: 91 U/L    Aspartate Aminotransferase (AST/SGOT): 26 U/L    Alanine Aminotransferase (ALT/SGPT): 46 U/L RC - high  Magnesium, Serum (04.09.18 @ 07:39)    Magnesium, Serum: 2.2 mg/dL    Point of Care Testing BG: (4/9)145,118, (4/8)118-181.    Skin: No pressure ulcers noted.    Estimated Needs:   [ x ] no change since previous assessment  [ ] recalculated:       Previous Nutrition Diagnosis:   Inadequate Oral Intake and Increased Nutrient Needs improving with PO diet and supplementation.  Food & Nutrition Related Knowledge Deficit improving with diet education.        New Nutrition Diagnosis: [ x ] not applicable    Interventions:   1. Encourage PO intake with all meals.  2. Encourage use of alternate menu options as needed.  3. Provide food preferences within therapeutic diet when requested.   4. Reviewed nutrition guidelines for after transplant.  5. Pt made aware RD remains available as needed.       Monitoring and Evaluation:     [ x ] PO intake [ x ] Tolerance to diet prescription [ x ] weights [ x ] follow up per protocol    [ ] other:

## 2018-04-09 NOTE — PROGRESS NOTE ADULT - SUBJECTIVE AND OBJECTIVE BOX
Health system DIVISION OF KIDNEY DISEASES AND HYPERTENSION -- PROGRESS NOTE    Chief complaint: NORMAN in heart transplant patient    24 hour events/subjective: received kayexalate for hyperkalemia        PAST HISTORY  --------------------------------------------------------------------------------  No significant changes to PMH, PSH, FHx, SHx, unless otherwise noted    ALLERGIES & MEDICATIONS  --------------------------------------------------------------------------------  Allergies    No Known Allergies    Intolerances      Standing Inpatient Medications  ALBUTerol/ipratropium for Nebulization 3 milliLiter(s) Nebulizer every 6 hours  aspirin enteric coated 81 milliGRAM(s) Oral daily  atovaquone Suspension 1500 milliGRAM(s) Oral daily  Calcium Citrate + Vit D, 315 mg/200 Unit 2 Tablet(s) 2 Tablet(s) Oral two times a day  diltiazem    milliGRAM(s) Oral daily  docusate sodium 100 milliGRAM(s) Oral three times a day  enoxaparin Injectable 70 milliGRAM(s) SubCutaneous <User Schedule>  famotidine    Tablet 20 milliGRAM(s) Oral daily  insulin lispro (HumaLOG) corrective regimen sliding scale   SubCutaneous three times a day before meals  insulin lispro (HumaLOG) corrective regimen sliding scale   SubCutaneous at bedtime  lidocaine 2% Injectable 20 milliLiter(s) Local Injection once  magnesium oxide 1200 milliGRAM(s) Oral <User Schedule>  multivitamin 1 Tablet(s) Oral daily  mycophenolate mofetil 1000 milliGRAM(s) Oral <User Schedule>  nystatin    Suspension [id number] Unit(s) Oral every 6 hours  pravastatin 20 milliGRAM(s) Oral at bedtime  predniSONE   Tablet 15 milliGRAM(s) Oral <User Schedule>  silver sulfADIAZINE 1% Cream 1 Application(s) Topical two times a day  sodium bicarbonate 650 milliGRAM(s) Oral three times a day  tacrolimus 9 milliGRAM(s) Oral <User Schedule>  valGANciclovir 450 milliGRAM(s) Oral <User Schedule>    PRN Inpatient Medications  acetaminophen   Tablet. 650 milliGRAM(s) Oral every 6 hours PRN      REVIEW OF SYSTEMS  --------------------------------------------------------------------------------  Constitutional: [ ] Fever [ ] Chills [x ]no Fatigue [ ] Weight change   HEENT: [ ] Blurred vision [ ] Eye Pain [ ] Headache [ ] Runny nose [ ] Sore Throat   Respiratory: [ ] Cough [ ] Wheezing [x ]no Shortness of breath  Cardiovascular: [x ] no Chest Pain [ ] Palpitations [ ] LOBATO [ ] PND [ ] Orthopnea  Gastrointestinal: [x ] no Abdominal Pain [ ] Diarrhea [ ] Constipation [ ] Hemorrhoids [ ] Nausea [ ] Vomiting  Genitourinary: [ ] Nocturia [ ] Dysuria [ ] Incontinence  Extremities: [x ]no Swelling [ ] Joint Pain  Neurologic: [ ] Focal deficit [ ] Paresthesias [ ] Syncope  Lymphatic: [ ] Swelling [ ] Lymphadenopathy   Skin: [ ] Rash [ ] Ecchymoses [ ] Wounds [ ] Lesions  Psychiatry: [ ] Depression [ ] Suicidal/Homicidal Ideation [ ] Anxiety [ ] Sleep Disturbances  [x ] 10 point review of systems is otherwise negative except as mentioned above              [ ]Unable to obtain due to   All other systems were reviewed and are negative, except as noted.    VITALS/PHYSICAL EXAM  --------------------------------------------------------------------------------  T(C): 36.7 (04-09-18 @ 11:35), Max: 36.8 (04-08-18 @ 19:39)  HR: 99 (04-09-18 @ 11:35) (96 - 103)  BP: 128/87 (04-09-18 @ 11:35) (120/85 - 138/92)  RR: 18 (04-09-18 @ 11:35) (17 - 18)  SpO2: 99% (04-09-18 @ 11:35) (96% - 99%)  Wt(kg): --    Weight (kg): 73.9 (04-07-18 @ 12:31)      04-08-18 @ 07:01  -  04-09-18 @ 07:00  --------------------------------------------------------  IN: 830 mL / OUT: 1200 mL / NET: -370 mL    04-09-18 @ 07:01  -  04-09-18 @ 12:29  --------------------------------------------------------  IN: 360 mL / OUT: 0 mL / NET: 360 mL      Physical Exam:  	Gen: NAD, well-appearing  	HEENT: on room air  	Pulm: CTA B/L  	CV: normal S1S2; no rub  	Abd: soft                      Back : No sacral edema  	: No emily  	LE: no edema  	Skin: Warm  	    LABS/STUDIES  --------------------------------------------------------------------------------              9.3    13.0  >-----------<  315      [04-09-18 @ 07:39]              29.9     141  |  106  |  33  ----------------------------<  118      [04-09-18 @ 07:39]  5.5   |  22  |  1.31        Ca     9.3     [04-09-18 @ 07:39]      Mg     2.2     [04-09-18 @ 07:39]    TPro  7.0  /  Alb  3.7  /  TBili  0.5  /  DBili  x   /  AST  26  /  ALT  46  /  AlkPhos  91  [04-09-18 @ 07:39]          Creatinine Trend:  SCr 1.31 [04-09 @ 07:39]  SCr 1.51 [04-08 @ 07:44]  SCr 1.55 [04-07 @ 07:36]  SCr 2.08 [04-06 @ 07:31]  SCr 1.93 [04-05 @ 07:42]    Urinalysis - [03-23-18 @ 16:03]      Color Yellow / Appearance Slightly Turbid / SG 1.020 / pH 5.0      Gluc Negative / Ketone Negative  / Bili Negative / Urobili Negative       Blood Small / Protein 30 / Leuk Est Small / Nitrite Negative      RBC 5 / WBC 8 / Hyaline 0 / Gran  / Sq Epi  / Non Sq Epi 4 / Bacteria Few      Iron 22, TIBC 182, %sat 12      [02-13-18 @ 12:44]  Ferritin 79.0      [02-13-18 @ 12:44]  HbA1c 5.3      [03-18-18 @ 11:20]  TSH 1.48      [02-27-18 @ 08:13]  Lipid: chol 62, TG 33, HDL 17, LDL 38      [06-07-17 @ 06:14]      C3 Complement 135      [03-19-18 @ 15:13]  C4 Complement 37      [03-19-18 @ 15:13]  Free Light Chains: kappa 4.36, lambda 5.71, ratio = 0.76      [03-19 @ 15:13]  Immunofixation Serum:   No Monoclonal Band Identified      [03-19-18 @ 15:13]  Cryoglobulin: Negative      [03-19-18 @ 15:13]

## 2018-04-09 NOTE — PROGRESS NOTE ADULT - ASSESSMENT
67M ACC/AHA stage D chronic systolic heart failure due to NICM s/p HM2 LVAD 6/17 c/b pump thrombosis now s/p heart transplant on 2/23 (CMV D-/R+ and Toxo D-/R+), with post-op course c/b primary graft dysfunction, initially thought to be immune-mediated due to positive B-cell flow (negative CDC cross match) and received plasmapharesis/IVIg x2 with improvement in LV function. His course has been complicated by bilateral IJ/subclavian thrombi. He currently has acute renal failure of unclear etiology. However, he does not have clear evidence of allograft dysfunction or low output on exam. 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     # Immunosuppression:  Tacrolimus -  Will continue dosing at 9 mg BID (increased yesterday). Tacro level 7 this morning from 11.9 Target trough level of 12-14. - - cardizem  240 qd  CellCept - continue 1000 mg PO BID.   Steroids - will give short pulse of oral steroids. Will give 50 mg PO BID for three days with subsequent taper.  Taper starting 3/24 45 mg x2 the 40 x2  decrease by 10  until 15 mg bid- ordered    # Antimicrobial prophylaxis:  PCP - continue Mepron 1500mg PO daily      # Bilateral IJ/Subclavian thrombi - unchanged on f/u U/S 3/12  -   - CHERIE negative    #Acute renal failure, possibly related to vancomycin dosing, improving.   - Vanco stopped.   - No further diuresis.     # CAV PPx  - Continue ECASA 81mg PO daily  - Continue pravastatin 20mg PO QHS    # ID   - Pseudomonas in sputum - course of cefepime completed 3/15  - DLES improved and vancomycin stopped given high trough with NORMAN. Per ID, will redose with daptomycin depending upon repeat vanc level this morning.   - silver sulfadizaine dressings per plastics for R 3rd digit.   - HCV viral load and PCR per protocol. Therapy to be initiated as outpatient by Dr. Thomas (Hepatology)  - Transplant ID following      3/26 Pt for repeat UE thrombus as per HF, r/o propagation  Creat is improving may need medications adjustment  Ambulate  3/27 Biopsy now scheduled for Wednesday. April 4    UE duplex with new innominate vein partial thrombus, cont anticoagulation, now therapeutic. D/w Dr Tavarez     3/28 prograf increased to 9mg bid.  Renal function continues to improve  Will resume hep sq tonight after biopsy this am  3/29 - Prograf 0.5mg po x 1 stat & increase Prograf to 9.5mg po bid  increase CardizemCD to 300mg po daily - received CardizemCD 240 this am therefore Cardizem 60mg po x 1 given this am  CXR w/ RLL haziness - will ultrasound  R LL today  36/30 Hyperkalemia - kayexalate 30 x1  Na bicarb added  Creat improved, now on lovenox  3/31   feeling well today,  VSS, TAC level 10.0, education by transplant coordinator with family, potassium stable  5.0,   cardizem to 360 Qd  4/1    VSS   5 beats wct in NSR,    LAVD site with VAC,   WBC   16.   afebrile,   TAC level   10.1  4/2 Planning on d/c home Tuesday, vac reordered.  Tacro lvl 12 on 10 bid .  Plastics f/u ,  cont silvadene , f/u outpatient.;  4/5 - Rounds made with Dr. Stahl:   tacro level - 22.4 - will hold prograf this evening - & decrease dose to 9mg po bid starting 4/6 IV lasix 40mg x 1;  - with increase tac level - will decrease Cardizem CD to 240mg po daily  4/6 prograf level: 14.4 this am- d/w Dr. Stahl- prograf 5 mg given this am then 12 mg this evening; prograf 9 bid to start in am sat 4/7; VSS;  rt pleural eff- sono site; minimal fluid as per CTU Pa/NP; driveline vac changed today   4/7 GFR improved. Heparin BID changed to Lovenox bid. Magnesium oxide decreased to  once daily. Valcyte 450mg increased QD  4/8 SR  - possible d/c home Monday as per transplant team.  4/9 hyperkalemia requiring kayexalate..  Will d/w HF the timing of d/c home 67M ACC/AHA stage D chronic systolic heart failure due to NICM s/p HM2 LVAD 6/17 c/b pump thrombosis now s/p heart transplant on 2/23 (CMV D-/R+ and Toxo D-/R+), with post-op course c/b primary graft dysfunction, initially thought to be immune-mediated due to positive B-cell flow (negative CDC cross match) and received plasmapharesis/IVIg x2 with improvement in LV function. His course has been complicated by bilateral IJ/subclavian thrombi. He currently has acute renal failure of unclear etiology. However, he does not have clear evidence of allograft dysfunction or low output on exam. 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     # Immunosuppression:  Tacrolimus -  Will continue dosing at 9 mg BID (increased yesterday). Tacro level 7 this morning from 11.9 Target trough level of 12-14. - - cardizem  240 qd  CellCept - continue 1000 mg PO BID.   Steroids - will give short pulse of oral steroids. Will give 50 mg PO BID for three days with subsequent taper.  Taper starting 3/24 45 mg x2 the 40 x2  decrease by 10  until 15 mg bid- ordered    # Antimicrobial prophylaxis:  PCP - continue Mepron 1500mg PO daily      # Bilateral IJ/Subclavian thrombi - unchanged on f/u U/S 3/12  -   - CHERIE negative    #Acute renal failure, possibly related to vancomycin dosing, improving.   - Vanco stopped.   - No further diuresis.     # CAV PPx  - Continue ECASA 81mg PO daily  - Continue pravastatin 20mg PO QHS    # ID   - Pseudomonas in sputum - course of cefepime completed 3/15  - DLES improved and vancomycin stopped given high trough with NORMAN. Per ID, will redose with daptomycin depending upon repeat vanc level this morning.   - silver sulfadizaine dressings per plastics for R 3rd digit.   - HCV viral load and PCR per protocol. Therapy to be initiated as outpatient by Dr. Thomas (Hepatology)  - Transplant ID following      3/26 Pt for repeat UE thrombus as per HF, r/o propagation  Creat is improving may need medications adjustment  Ambulate  3/27 Biopsy now scheduled for Wednesday. April 4    UE duplex with new innominate vein partial thrombus, cont anticoagulation, now therapeutic. D/w Dr Tavarez     3/28 prograf increased to 9mg bid.  Renal function continues to improve  Will resume hep sq tonight after biopsy this am  3/29 - Prograf 0.5mg po x 1 stat & increase Prograf to 9.5mg po bid  increase CardizemCD to 300mg po daily - received CardizemCD 240 this am therefore Cardizem 60mg po x 1 given this am  CXR w/ RLL haziness - will ultrasound  R LL today  36/30 Hyperkalemia - kayexalate 30 x1  Na bicarb added  Creat improved, now on lovenox  3/31   feeling well today,  VSS, TAC level 10.0, education by transplant coordinator with family, potassium stable  5.0,   cardizem to 360 Qd  4/1    VSS   5 beats wct in NSR,    LAVD site with VAC,   WBC   16.   afebrile,   TAC level   10.1  4/2 Planning on d/c home Tuesday, vac reordered.  Tacro lvl 12 on 10 bid .  Plastics f/u ,  cont silvadene , f/u outpatient.;  4/5 - Rounds made with Dr. Stahl:   tacro level - 22.4 - will hold prograf this evening - & decrease dose to 9mg po bid starting 4/6 IV lasix 40mg x 1;  - with increase tac level - will decrease Cardizem CD to 240mg po daily  4/6 prograf level: 14.4 this am- d/w Dr. Stahl- prograf 5 mg given this am then 12 mg this evening; prograf 9 bid to start in am sat 4/7; VSS;  rt pleural eff- sono site; minimal fluid as per CTU Pa/NP; driveline vac changed today   4/7 GFR improved. Heparin BID changed to Lovenox bid. Magnesium oxide decreased to  once daily. Valcyte 450mg increased QD  4/8 SR  - possible d/c home Monday as per transplant team.  4/9 hyperkalemia requiring kayexalate, will need to repeat.  Increase ambulation.  D/c planning

## 2018-04-09 NOTE — PROGRESS NOTE ADULT - ATTENDING COMMENTS
Agree with above assessment, have reviewed and made edits where appropriate.  Overall Mr. Baez is doing well.  During session, was open to discussing his feelings related to process of heart transplant.  Receptive to education on possible occurrence of mood shifts as a result of steroid use. Strong support system (close friend, brothers).

## 2018-04-09 NOTE — PROGRESS NOTE ADULT - PROBLEM SELECTOR PLAN 1
Pt. with hemodynamically mediated NORMAN in the setting of post cardiac transplant, and tacrolimus, and bactrim use. Off bactrim now and with reduced dose of Tacrolimus. Non-oliguric, with decrease in Scr on labs. Monitor BMP. Strict I/Os. Avoid nephrotoxins.

## 2018-04-09 NOTE — PROGRESS NOTE ADULT - ASSESSMENT
Seen sitting in chair.  Mood frustrated.  Ambulating around hallway.  Sleep OK.  Appetite so-so.  Occasional pain in feet; takes Tylenol with effect.  Coping strategy: watching television.  Patient expressed frustration, upset, feeling of powerlessness, and mistrust in hospital staff due to recent change in discharge date and in lack of communication by staff regarding his health condition and discharge plan.  Patient also expressed fear of needing to be readmitted to hospital post-discharge.  Patient was provided some psychoeducation on possible occurrence of mood shifts as a result of steroid use.           DX: Adjustment disorder       Recommendations:   Patient would benefit from increased communication regarding his health condition and discharge plan by hospital staff.  Behavioral Cardiology will continue to follow as needed.     Rosio Dacosta M.A.

## 2018-04-09 NOTE — PROGRESS NOTE ADULT - SUBJECTIVE AND OBJECTIVE BOX
VITAL SIGNS    Telemetry:      Vital Signs Last 24 Hrs  T(C): 36.7 (18 @ 11:35), Max: 36.8 (18 @ 19:39)  T(F): 98 (18 @ 11:35), Max: 98.2 (18 @ 19:39)  HR: 99 (18 @ 11:35) (96 - 103)  BP: 128/87 (18 @ 11:35) (120/85 - 138/92)  RR: 18 (18 @ 11:35) (17 - 18)  SpO2: 99% (18 @ 11:35) (96% - 99%)                    @ 07:01  -   @ 07:00  --------------------------------------------------------  IN: 830 mL / OUT: 1200 mL / NET: -370 mL     @ 07:01  -   @ 11:54  --------------------------------------------------------  IN: 360 mL / OUT: 0 mL / NET: 360 mL          Daily     Daily Weight in k.5 (2018 05:10)        CAPILLARY BLOOD GLUCOSE      POCT Blood Glucose.: 145 mg/dL (2018 11:16)  POCT Blood Glucose.: 118 mg/dL (2018 07:30)  POCT Blood Glucose.: 139 mg/dL (2018 22:16)  POCT Blood Glucose.: 128 mg/dL (2018 19:13)                Coumadin    [ ] YES          [  ]      NO                                   PHYSICAL EXAM        Neurology: alert and oriented x 3, nonfocal, no gross deficits  CV : .S1S2 RRR  Sternal Wound :  CDI , Stable  Pacing Wires        [  ]   Settings:                                  Isolated  [  ]  Lungs: bibasilar crackles   Drains:     MS         [  ] Drainage:                 L Pleural  [  ]  Drainage:                R Pleural  [  ]  Drainage:  Abdomen: soft, nontender, nondistended, positive bowel sounds, last bowel movement   :         voiding    Extremities:               acetaminophen   Tablet. 650 milliGRAM(s) Oral every 6 hours PRN  ALBUTerol/ipratropium for Nebulization 3 milliLiter(s) Nebulizer every 6 hours  aspirin enteric coated 81 milliGRAM(s) Oral daily  atovaquone Suspension 1500 milliGRAM(s) Oral daily  Calcium Citrate + Vit D, 315 mg/200 Unit 2 Tablet(s) 2 Tablet(s) Oral two times a day  diltiazem    milliGRAM(s) Oral daily  docusate sodium 100 milliGRAM(s) Oral three times a day  enoxaparin Injectable 70 milliGRAM(s) SubCutaneous <User Schedule>  famotidine    Tablet 20 milliGRAM(s) Oral daily  insulin lispro (HumaLOG) corrective regimen sliding scale   SubCutaneous three times a day before meals  insulin lispro (HumaLOG) corrective regimen sliding scale   SubCutaneous at bedtime  lidocaine 2% Injectable 20 milliLiter(s) Local Injection once  magnesium oxide 1200 milliGRAM(s) Oral <User Schedule>  multivitamin 1 Tablet(s) Oral daily  mycophenolate mofetil 1000 milliGRAM(s) Oral <User Schedule>  nystatin    Suspension 083017 Unit(s) Oral every 6 hours  pravastatin 20 milliGRAM(s) Oral at bedtime  predniSONE   Tablet 15 milliGRAM(s) Oral <User Schedule>  silver sulfADIAZINE 1% Cream 1 Application(s) Topical two times a day  sodium bicarbonate 650 milliGRAM(s) Oral three times a day  tacrolimus 9 milliGRAM(s) Oral <User Schedule>  valGANciclovir 450 milliGRAM(s) Oral <User Schedule>                    Physical Therapy Rec:   Home  [  ]   Home w/ PT  [  ]  Rehab  [  ]  Discussed with Cardiothoracic Team at AM rounds. hello whats     VITAL SIGNS    Telemetry:  nsr 90    Vital Signs Last 24 Hrs  T(C): 36.7 (18 @ 11:35), Max: 36.8 (18 @ 19:39)  T(F): 98 (18 @ 11:35), Max: 98.2 (18 @ 19:39)  HR: 99 (18 @ 11:35) (96 - 103)  BP: 128/87 (18 @ 11:35) (120/85 - 138/92)  RR: 18 (18 @ 11:35) (17 - 18)  SpO2: 99% (18 @ 11:35) (96% - 99%)                    @ 07:01  -   @ 07:00  --------------------------------------------------------  IN: 830 mL / OUT: 1200 mL / NET: -370 mL     @ 07:01  -   @ 11:54  --------------------------------------------------------  IN: 360 mL / OUT: 0 mL / NET: 360 mL          Daily     Daily Weight in k.5 (2018 05:10)        CAPILLARY BLOOD GLUCOSE      POCT Blood Glucose.: 145 mg/dL (2018 11:16)  POCT Blood Glucose.: 118 mg/dL (2018 07:30)  POCT Blood Glucose.: 139 mg/dL (2018 22:16)  POCT Blood Glucose.: 128 mg/dL (2018 19:13)                Coumadin    [ ] YES          [ x ]      NO                                   PHYSICAL EXAM        Neurology: alert and oriented x 3, nonfocal, no gross deficits  CV : .S1S2 RRR  Sternal Wound :  CDI , Stable  Lungs: rll diminished  Abdomen: soft, nontender, nondistended, positive bowel sounds, last bowel movement , vac to suction  :         voiding    Extremities:     - edema - calve           acetaminophen   Tablet. 650 milliGRAM(s) Oral every 6 hours PRN  ALBUTerol/ipratropium for Nebulization 3 milliLiter(s) Nebulizer every 6 hours  aspirin enteric coated 81 milliGRAM(s) Oral daily  atovaquone Suspension 1500 milliGRAM(s) Oral daily  Calcium Citrate + Vit D, 315 mg/200 Unit 2 Tablet(s) 2 Tablet(s) Oral two times a day  diltiazem    milliGRAM(s) Oral daily  docusate sodium 100 milliGRAM(s) Oral three times a day  enoxaparin Injectable 70 milliGRAM(s) SubCutaneous <User Schedule>  famotidine    Tablet 20 milliGRAM(s) Oral daily  insulin lispro (HumaLOG) corrective regimen sliding scale   SubCutaneous three times a day before meals  insulin lispro (HumaLOG) corrective regimen sliding scale   SubCutaneous at bedtime  lidocaine 2% Injectable 20 milliLiter(s) Local Injection once  magnesium oxide 1200 milliGRAM(s) Oral <User Schedule>  multivitamin 1 Tablet(s) Oral daily  mycophenolate mofetil 1000 milliGRAM(s) Oral <User Schedule>  nystatin    Suspension 826797 Unit(s) Oral every 6 hours  pravastatin 20 milliGRAM(s) Oral at bedtime  predniSONE   Tablet 15 milliGRAM(s) Oral <User Schedule>  silver sulfADIAZINE 1% Cream 1 Application(s) Topical two times a day  sodium bicarbonate 650 milliGRAM(s) Oral three times a day  tacrolimus 9 milliGRAM(s) Oral <User Schedule>  valGANciclovir 450 milliGRAM(s) Oral <User Schedule>                    Physical Therapy Rec:   Home  [  ]   Home w/ PT  [  ]  Rehab  [  ]  Discussed with Cardiothoracic Team at AM rounds.

## 2018-04-09 NOTE — PROGRESS NOTE ADULT - ASSESSMENT
Mr. Baez is a 67 year old man with ACC/AHA stage D chronic systolic heart failure due to NICM s/p HM2 LVAD 6/17 c/b pump thrombosis now s/p heart transplant on 2/23 (CMV D-/R+ and Toxo D-/R+), with post-op course c/b primary graft dysfunction, initially thought to be immune-mediated due to positive B-cell flow (negative CDC cross match) and received plasmapharesis/IVIg x2 with improvement in LV function. His course has been complicated by bilateral IJ/subclavian thrombi and he has a persistent small  right sided pleural effusion as well as chronic renal failure, thought to be related to Prograf.     His echocardiogram on 4/3 showed evidence of slight worsened LV strain, LVEF of 52%, and slightly diminished right ventricular systolic function. It was determined that he missed five doses of prednisone. RHC on 4/4 showed elevated biventricular filling pressures with normal cardiac output. Biopsy showed 1R/1A cellular rejection. C4D continues to be diffusely positive. DSA is unremarkable.     His tacrolimus level is within range today at 10.0. Due to his renal dysfunction, he has a slightly lower goal level of 10-12. Mr. Baez is a 67 year old man with ACC/AHA stage D chronic systolic heart failure due to NICM s/p HM2 LVAD 6/17 c/b pump thrombosis now s/p heart transplant on 2/23 (CMV D-/R+ and Toxo D-/R+), with post-op course c/b primary graft dysfunction, initially thought to be immune-mediated due to positive B-cell flow (negative CDC cross match) and received plasmapharesis/IVIg x2 with improvement in LV function. His course has been complicated by bilateral IJ/subclavian thrombi and he has a persistent small  right sided pleural effusion as well as chronic renal failure, thought to be related to Prograf.     His echocardiogram on 4/3 showed evidence of slight worsened LV strain, LVEF of 52%, and slightly diminished right ventricular systolic function. It was determined that he missed five doses of prednisone. RHC on 4/4 showed elevated biventricular filling pressures with normal cardiac output. Biopsy showed 1R/1A cellular rejection. C4D continues to be diffusely positive. DSA is unremarkable.     His tacrolimus level today is 10.0, down from 11.9 yesterday and 14.9 on 4/7 in the setting of improving renal function and a decreased dose of diltiazem. Due to his renal dysfunction, he has a slightly lower goal level of 10-12. Mr. Baez is a 67 year old man with ACC/AHA stage D chronic systolic heart failure due to NICM s/p HM2 LVAD 6/17 c/b pump thrombosis now s/p heart transplant on 2/23 (CMV D-/R+ and Toxo D-/R+), with post-op course c/b primary graft dysfunction, initially thought to be immune-mediated due to positive B-cell flow (negative CDC cross match) and received plasmapharesis/IVIg x2 with improvement in LV function. His course has been complicated by bilateral IJ/subclavian thrombi and he has a persistent small  right sided pleural effusion as well as chronic renal failure, thought to be related to Prograf.     His echocardiogram on 4/3 showed evidence of slight worsened LV strain, LVEF of 52%, and slightly diminished right ventricular systolic function. It was determined that he missed five doses of prednisone. RHC on 4/4 showed elevated biventricular filling pressures with normal cardiac output. Biopsy showed 1R/1A cellular rejection. C4D continues to be diffusely positive. DSA is unremarkable.     His tacrolimus level today is 10.0, down from 11.9 yesterday and 14.9 on 4/7 in the setting of improving renal function and a decreased dose of diltiazem. Due to his renal dysfunction, he has a slightly lower goal level of 10-12. His potassium continues to be elevated. Of note, he endorses drinking leftover ensure shakes left at his bedside from an old diet order. Mr. Baez is a 67 year old man with ACC/AHA stage D chronic systolic heart failure due to NICM s/p HM2 LVAD 6/17 c/b pump thrombosis now s/p heart transplant on 2/23 (CMV D-/R+ and Toxo D-/R+), with post-op course c/b primary graft dysfunction, initially thought to be immune-mediated due to positive B-cell flow (negative CDC cross match) and received plasmapharesis/IVIg x2 with improvement in LV function. His course has been complicated by bilateral IJ/subclavian thrombi and he has a persistent small  right sided pleural effusion as well as chronic renal failure, thought to be related to Prograf.     His echocardiogram on 4/3 showed evidence of slight worsened LV strain, LVEF of 52%, and slightly diminished right ventricular systolic function. It was determined that he missed five doses of prednisone. RHC on 4/4 showed elevated biventricular filling pressures with normal cardiac output. Biopsy showed 1R/1A cellular rejection. C4D continues to be diffusely positive. DSA is unremarkable.     His tacrolimus level today is 10.0, down from 11.9 yesterday, and 14.9 on 4/7,this is in the setting of improving renal function and a decreased dose of diltiazem. Due to his renal dysfunction, he has a slightly lower tacro goal level of 10-12. His potassium continues to be elevated which could be related to his tacro or ensure supplements that he endorses drinking.

## 2018-04-09 NOTE — PROGRESS NOTE ADULT - ASSESSMENT
67 year old man with ACC/AHA stage D chronic systolic heart failure due to NICM (LVEF 20%) s/p HM2 LVAD 6/17 c/b pump thrombus now s/p OHT 2/23 (CMV D-/R+ and Toxo D-/R+), with post-op course c/b primary graft dysfunction, initially thought to be immune-mediated due to positive B-cell flow (negative CDC cross match) and received plasmapheresis/IVIG x2 with improvement in LV function. Found to have b/l IJ/subclavian thrombi as well. CrCl somewhat decreased from baseline pre-tx.    1) Antimicrobial prophylaxis:  Intermediate risk CMV - Valcyte 450mg adjusted to tiw based upon rising creatinine  Intermediate risk Toxo/PCP - continue Mepron 1500mg PO daily with food.  Thrush - continue Nystatin S/S 5 mL Qid  HCV+ donor/HCV- recipient; HCV (1a) - 3/27 HCV PCR 32,891 (To be treated outpatient, monitoring weekly)      2) Leukocytosis improved    stable for discharge home from an ID perspective        Gary Lujan MD  [phone]  After 5pm/weekends [phone]

## 2018-04-09 NOTE — PROGRESS NOTE ADULT - PROBLEM SELECTOR PLAN 2
in the setting of NORMAN and tacrolimus use. Serum potassium elevated at 5.5 today. Give kayexalate 30 grams stat. Monitor potassium and give low potassium diet.

## 2018-04-09 NOTE — PROGRESS NOTE ADULT - PROBLEM SELECTOR PLAN 6
-Continue atovaquone for PJP and toxoplasmosis prophylaxis  -Creatinine clearance 57.7. Continue valganciclovir 450mg once daily for CMV prophylaxis (intermediate risk).   -Continue nystatin for thrush prophylaxis -Continue atovaquone for PJP and toxoplasmosis prophylaxis  -Creatinine clearance 57.7. increase valganciclovir 450mg to twice daily for CMV prophylaxis (intermediate risk).   -Continue nystatin for thrush prophylaxis

## 2018-04-09 NOTE — PROGRESS NOTE ADULT - ASSESSMENT
Mr. Baez is a 67 year old man with ACC/AHA stage D chronic systolic heart failure due to NICM s/p HM2 LVAD 6/17 c/b pump thrombosis now s/p heart transplant on 2/23 (CMV D-/R+ and Toxo D-/R+), with post-op course c/b primary graft dysfunction, initially thought to be immune-mediated due to positive B-cell flow (negative CDC cross match) and received plasmapharesis/IVIg x2 with improvement in LV function. His course has been complicated by bilateral IJ/subclavian thrombi and he has a persistent small  right sided pleural effusion as well as chronic renal failure, thought to be related to Prograf.     His echocardiogram on 4/3 showed evidence of slight worsened LV strain, LVEF of 52%, and slightly diminished right ventricular systolic function. It was determined that he missed five doses of prednisone. RHC on 4/4 showed elevated biventricular filling pressures with normal cardiac output. Biopsy showed 1R/1A cellular rejection. C4D continues to be diffusely positive. DSA is unremarkable.     His tacrolimus level today is 10.0, down from 11.9 yesterday, and 14.9 on 4/7,this is in the setting of improving renal function and a decreased dose of diltiazem. Due to his renal dysfunction, he has a slightly lower tacro goal level of 10-12. His potassium continues to be elevated which could be related to his tacro or ensure supplements that he endorses drinking.

## 2018-04-09 NOTE — PROGRESS NOTE ADULT - PROBLEM SELECTOR PLAN 2
-Continue prednisone at 15 mg PO BID.   -Continue CellCept 1000 mg BID.  -Continue tacrolimus 9 mg PO BID. We will continue the diltiazem at reduced dose of 240 mg daily.    -Continue on aspirin and Pravachol. -Continue prednisone at 15 mg PO BID.   -Continue CellCept 1000 mg BID.  -Adjust tacrolimus to 9 mg in the AM and 10mg in the PM. Continue the diltiazem at reduced dose of 240 mg daily.    -Continue on aspirin and Pravachol. -Continue prednisone at 15 mg PO BID.   -Continue CellCept 1000 mg BID.  -Adjust tacrolimus to 9 mg in the AM and 10mg in the PM. Continue the diltiazem at current dose of 240 mg daily.

## 2018-04-09 NOTE — PROGRESS NOTE ADULT - PROBLEM SELECTOR PLAN 4
-Continue subcutaneous enoxaparin. -Continue subcutaneous enoxaparin at current dosing.  -Vascular cardiology

## 2018-04-09 NOTE — PROGRESS NOTE ADULT - SUBJECTIVE AND OBJECTIVE BOX
Subjective:  -No acute events overnight  -Reports feeling good without complaints of chest pain, palpitations, shortness of breath, abdominal discomfort or nausea   -Ambulated to and from the bathroom today without LOBATO     Medications:  acetaminophen   Tablet. 650 milliGRAM(s) Oral every 6 hours PRN  ALBUTerol/ipratropium for Nebulization 3 milliLiter(s) Nebulizer every 6 hours  aspirin enteric coated 81 milliGRAM(s) Oral daily  atovaquone Suspension 1500 milliGRAM(s) Oral daily  Calcium Citrate + Vit D, 315 mg/200 Unit 2 Tablet(s) 2 Tablet(s) Oral two times a day  diltiazem    milliGRAM(s) Oral daily  docusate sodium 100 milliGRAM(s) Oral three times a day  enoxaparin Injectable 70 milliGRAM(s) SubCutaneous <User Schedule>  famotidine    Tablet 20 milliGRAM(s) Oral daily  insulin lispro (HumaLOG) corrective regimen sliding scale   SubCutaneous three times a day before meals  insulin lispro (HumaLOG) corrective regimen sliding scale   SubCutaneous at bedtime  lidocaine 2% Injectable 20 milliLiter(s) Local Injection once  magnesium oxide 1200 milliGRAM(s) Oral <User Schedule>  multivitamin 1 Tablet(s) Oral daily  mycophenolate mofetil 1000 milliGRAM(s) Oral <User Schedule>  nystatin    Suspension 989813 Unit(s) Oral every 6 hours  pravastatin 20 milliGRAM(s) Oral at bedtime  predniSONE   Tablet 15 milliGRAM(s) Oral <User Schedule>  silver sulfADIAZINE 1% Cream 1 Application(s) Topical two times a day  sodium bicarbonate 650 milliGRAM(s) Oral three times a day  tacrolimus 9 milliGRAM(s) Oral <User Schedule>  valGANciclovir 450 milliGRAM(s) Oral <User Schedule>      Physical Exam:    Vital Signs Last 24 Hrs  T(C): 36.7 (2018 11:35), Max: 36.8 (2018 19:39)  T(F): 98 (2018 11:35), Max: 98.2 (2018 19:39)  HR: 99 (2018 11:35) (96 - 103)  BP: 128/87 (2018 11:35) (120/85 - 138/92)  BP(mean): 111 (2018 03:30) (105 - 111)  RR: 18 (2018 11:35) (17 - 18)  SpO2: 99% (2018 11:35) (96% - 99%)    Daily     Daily Weight in k.5 (2018 05:10)    I&O's Summary    2018 07:  -  2018 07:00  --------------------------------------------------------  IN: 830 mL / OUT: 1200 mL / NET: -370 mL    2018 07:  -  2018 13:14  --------------------------------------------------------  IN: 780 mL / OUT: 550 mL / NET: 230 mL    General: No distress. Sitting in chair. Comfortable.  Neck: Neck supple. JVP not elevated. No masses  Chest: Clear to auscultation bilaterally  CV: Normal S1 and S2. No murmurs, rub, or gallops. Radial pulses normal. No lower extremity edema.   Abdomen: Soft, non-distended, non-tender, +bowel sounds   Skin: No rashes or skin breakdown. Wound vac site clean, dry, and intact   Neurology: Alert and oriented times three. Sensation intact  Psych: Affect normal    Labs:                        9.3    13.0  )-----------( 315      ( 2018 07:39 )             29.9         141  |  106  |  33<H>  ----------------------------<  118<H>  5.5<H>   |  22  |  1.31<H>    Ca    9.3      2018 07:39  Mg     2.2         TPro  7.0  /  Alb  3.7  /  TBili  0.5  /  DBili  x   /  AST  26  /  ALT  46<H>  /  AlkPhos  91   Subjective:  -No acute events overnight  -Reports feeling good without complaints of chest pain, palpitations, shortness of breath, abdominal discomfort or nausea   -Ambulated to and from the bathroom today without LOBATO     Medications:  acetaminophen   Tablet. 650 milliGRAM(s) Oral every 6 hours PRN  ALBUTerol/ipratropium for Nebulization 3 milliLiter(s) Nebulizer every 6 hours  aspirin enteric coated 81 milliGRAM(s) Oral daily  atovaquone Suspension 1500 milliGRAM(s) Oral daily  Calcium Citrate + Vit D, 315 mg/200 Unit 2 Tablet(s) 2 Tablet(s) Oral two times a day  diltiazem    milliGRAM(s) Oral daily  docusate sodium 100 milliGRAM(s) Oral three times a day  enoxaparin Injectable 70 milliGRAM(s) SubCutaneous <User Schedule>  famotidine    Tablet 20 milliGRAM(s) Oral daily  insulin lispro (HumaLOG) corrective regimen sliding scale   SubCutaneous three times a day before meals  insulin lispro (HumaLOG) corrective regimen sliding scale   SubCutaneous at bedtime  lidocaine 2% Injectable 20 milliLiter(s) Local Injection once  magnesium oxide 1200 milliGRAM(s) Oral <User Schedule>  multivitamin 1 Tablet(s) Oral daily  mycophenolate mofetil 1000 milliGRAM(s) Oral <User Schedule>  nystatin    Suspension 725957 Unit(s) Oral every 6 hours  pravastatin 20 milliGRAM(s) Oral at bedtime  predniSONE   Tablet 15 milliGRAM(s) Oral <User Schedule>  silver sulfADIAZINE 1% Cream 1 Application(s) Topical two times a day  sodium bicarbonate 650 milliGRAM(s) Oral three times a day  tacrolimus 9 milliGRAM(s) Oral <User Schedule>  valGANciclovir 450 milliGRAM(s) Oral <User Schedule>      Physical Exam:    Vital Signs Last 24 Hrs  T(C): 36.7 (2018 11:35), Max: 36.8 (2018 19:39)  T(F): 98 (2018 11:35), Max: 98.2 (2018 19:39)  HR: 99 (2018 11:35) (96 - 103)  BP: 128/87 (2018 11:35) (120/85 - 138/92)  BP(mean): 111 (2018 03:30) (105 - 111)  RR: 18 (2018 11:35) (17 - 18)  SpO2: 99% (2018 11:35) (96% - 99%)    Daily     Daily Weight in k.5 (2018 05:10)    I&O's Summary    2018 07:  -  2018 07:00  --------------------------------------------------------  IN: 830 mL / OUT: 1200 mL / NET: -370 mL    2018 07:  -  2018 13:14  --------------------------------------------------------  IN: 780 mL / OUT: 550 mL / NET: 230 mL    General: No distress. Sitting in chair. Comfortable.  Neck: Neck supple. JVP not elevated.  Chest: Clear to auscultation bilaterally  CV: Normal S1 and S2. No murmurs, rub, or gallops. Radial pulses normal. No lower extremity edema.   Abdomen: Soft, non-distended, non-tender, +bowel sounds   Skin: Right abdominal wound vac site clean, dry, and intact. Right third digit dressing clean, dry, and intact  Neurology: Alert and oriented times three. Sensation intact  Psych: Affect normal    Labs:                        9.3    13.0  )-----------( 315      ( 2018 07:39 )             29.9         141  |  106  |  33<H>  ----------------------------<  118<H>  5.5<H>   |  22  |  1.31<H>    Ca    9.3      2018 07:39  Mg     2.2         TPro  7.0  /  Alb  3.7  /  TBili  0.5  /  DBili  x   /  AST  26  /  ALT  46<H>  /  AlkPhos  91

## 2018-04-10 VITALS
OXYGEN SATURATION: 99 % | HEART RATE: 106 BPM | SYSTOLIC BLOOD PRESSURE: 121 MMHG | TEMPERATURE: 98 F | RESPIRATION RATE: 18 BRPM | DIASTOLIC BLOOD PRESSURE: 82 MMHG

## 2018-04-10 DIAGNOSIS — Z02.9 ENCOUNTER FOR ADMINISTRATIVE EXAMINATIONS, UNSPECIFIED: ICD-10-CM

## 2018-04-10 LAB
ALBUMIN SERPL ELPH-MCNC: 3.4 G/DL — SIGNIFICANT CHANGE UP (ref 3.3–5)
ALP SERPL-CCNC: 84 U/L — SIGNIFICANT CHANGE UP (ref 40–120)
ALT FLD-CCNC: 52 U/L RC — HIGH (ref 10–45)
ANION GAP SERPL CALC-SCNC: 13 MMOL/L — SIGNIFICANT CHANGE UP (ref 5–17)
AST SERPL-CCNC: 26 U/L — SIGNIFICANT CHANGE UP (ref 10–40)
BILIRUB SERPL-MCNC: 0.4 MG/DL — SIGNIFICANT CHANGE UP (ref 0.2–1.2)
BUN SERPL-MCNC: 32 MG/DL — HIGH (ref 7–23)
CALCIUM SERPL-MCNC: 8.8 MG/DL — SIGNIFICANT CHANGE UP (ref 8.4–10.5)
CHLORIDE SERPL-SCNC: 105 MMOL/L — SIGNIFICANT CHANGE UP (ref 96–108)
CO2 SERPL-SCNC: 23 MMOL/L — SIGNIFICANT CHANGE UP (ref 22–31)
CREAT SERPL-MCNC: 1.35 MG/DL — HIGH (ref 0.5–1.3)
GLUCOSE BLDC GLUCOMTR-MCNC: 129 MG/DL — HIGH (ref 70–99)
GLUCOSE BLDC GLUCOMTR-MCNC: 159 MG/DL — HIGH (ref 70–99)
GLUCOSE SERPL-MCNC: 127 MG/DL — HIGH (ref 70–99)
HCT VFR BLD CALC: 27.4 % — LOW (ref 39–50)
HCV RNA FLD QL NAA+PROBE: SIGNIFICANT CHANGE UP
HCV RNA SPEC QL PROBE+SIG AMP: DETECTED
HGB BLD-MCNC: 8.6 G/DL — LOW (ref 13–17)
MAGNESIUM SERPL-MCNC: 2.1 MG/DL — SIGNIFICANT CHANGE UP (ref 1.6–2.6)
MCHC RBC-ENTMCNC: 29.1 PG — SIGNIFICANT CHANGE UP (ref 27–34)
MCHC RBC-ENTMCNC: 31.4 GM/DL — LOW (ref 32–36)
MCV RBC AUTO: 92.7 FL — SIGNIFICANT CHANGE UP (ref 80–100)
PLATELET # BLD AUTO: 302 K/UL — SIGNIFICANT CHANGE UP (ref 150–400)
POTASSIUM SERPL-MCNC: 5.1 MMOL/L — SIGNIFICANT CHANGE UP (ref 3.5–5.3)
POTASSIUM SERPL-SCNC: 5.1 MMOL/L — SIGNIFICANT CHANGE UP (ref 3.5–5.3)
PROT SERPL-MCNC: 6.4 G/DL — SIGNIFICANT CHANGE UP (ref 6–8.3)
RBC # BLD: 2.95 M/UL — LOW (ref 4.2–5.8)
RBC # FLD: 19 % — HIGH (ref 10.3–14.5)
SODIUM SERPL-SCNC: 141 MMOL/L — SIGNIFICANT CHANGE UP (ref 135–145)
TACROLIMUS SERPL-MCNC: 10.1 NG/ML — SIGNIFICANT CHANGE UP
WBC # BLD: 13.7 K/UL — HIGH (ref 3.8–10.5)
WBC # FLD AUTO: 13.7 K/UL — HIGH (ref 3.8–10.5)

## 2018-04-10 PROCEDURE — 84295 ASSAY OF SERUM SODIUM: CPT

## 2018-04-10 PROCEDURE — 86923 COMPATIBILITY TEST ELECTRIC: CPT

## 2018-04-10 PROCEDURE — 83880 ASSAY OF NATRIURETIC PEPTIDE: CPT

## 2018-04-10 PROCEDURE — 84156 ASSAY OF PROTEIN URINE: CPT

## 2018-04-10 PROCEDURE — 83735 ASSAY OF MAGNESIUM: CPT

## 2018-04-10 PROCEDURE — 85652 RBC SED RATE AUTOMATED: CPT

## 2018-04-10 PROCEDURE — 86900 BLOOD TYPING SEROLOGIC ABO: CPT

## 2018-04-10 PROCEDURE — 86022 PLATELET ANTIBODIES: CPT

## 2018-04-10 PROCEDURE — 92526 ORAL FUNCTION THERAPY: CPT

## 2018-04-10 PROCEDURE — 84100 ASSAY OF PHOSPHORUS: CPT

## 2018-04-10 PROCEDURE — 82947 ASSAY GLUCOSE BLOOD QUANT: CPT

## 2018-04-10 PROCEDURE — 82272 OCCULT BLD FECES 1-3 TESTS: CPT

## 2018-04-10 PROCEDURE — 94010 BREATHING CAPACITY TEST: CPT

## 2018-04-10 PROCEDURE — 84550 ASSAY OF BLOOD/URIC ACID: CPT

## 2018-04-10 PROCEDURE — 85730 THROMBOPLASTIN TIME PARTIAL: CPT

## 2018-04-10 PROCEDURE — 97116 GAIT TRAINING THERAPY: CPT

## 2018-04-10 PROCEDURE — 92610 EVALUATE SWALLOWING FUNCTION: CPT

## 2018-04-10 PROCEDURE — 86645 CMV ANTIBODY IGM: CPT

## 2018-04-10 PROCEDURE — 85306 CLOT INHIBIT PROT S FREE: CPT

## 2018-04-10 PROCEDURE — 84244 ASSAY OF RENIN: CPT

## 2018-04-10 PROCEDURE — 83605 ASSAY OF LACTIC ACID: CPT

## 2018-04-10 PROCEDURE — C1887: CPT

## 2018-04-10 PROCEDURE — 76775 US EXAM ABDO BACK WALL LIM: CPT

## 2018-04-10 PROCEDURE — 87040 BLOOD CULTURE FOR BACTERIA: CPT

## 2018-04-10 PROCEDURE — P9012: CPT

## 2018-04-10 PROCEDURE — 82803 BLOOD GASES ANY COMBINATION: CPT

## 2018-04-10 PROCEDURE — 94799 UNLISTED PULMONARY SVC/PX: CPT

## 2018-04-10 PROCEDURE — 80048 BASIC METABOLIC PNL TOTAL CA: CPT

## 2018-04-10 PROCEDURE — 88341 IMHCHEM/IMCYTCHM EA ADD ANTB: CPT

## 2018-04-10 PROCEDURE — 83550 IRON BINDING TEST: CPT

## 2018-04-10 PROCEDURE — 88307 TISSUE EXAM BY PATHOLOGIST: CPT

## 2018-04-10 PROCEDURE — 81240 F2 GENE: CPT

## 2018-04-10 PROCEDURE — 97605 NEG PRS WND THER DME<=50SQCM: CPT

## 2018-04-10 PROCEDURE — 82088 ASSAY OF ALDOSTERONE: CPT

## 2018-04-10 PROCEDURE — 94770: CPT

## 2018-04-10 PROCEDURE — 84540 ASSAY OF URINE/UREA-N: CPT

## 2018-04-10 PROCEDURE — 97161 PT EVAL LOW COMPLEX 20 MIN: CPT

## 2018-04-10 PROCEDURE — 73200 CT UPPER EXTREMITY W/O DYE: CPT

## 2018-04-10 PROCEDURE — 78278 ACUTE GI BLOOD LOSS IMAGING: CPT

## 2018-04-10 PROCEDURE — 83010 ASSAY OF HAPTOGLOBIN QUANT: CPT

## 2018-04-10 PROCEDURE — 87070 CULTURE OTHR SPECIMN AEROBIC: CPT

## 2018-04-10 PROCEDURE — 80076 HEPATIC FUNCTION PANEL: CPT

## 2018-04-10 PROCEDURE — 99231 SBSQ HOSP IP/OBS SF/LOW 25: CPT

## 2018-04-10 PROCEDURE — 93451 RIGHT HEART CATH: CPT | Mod: 59

## 2018-04-10 PROCEDURE — 71250 CT THORAX DX C-: CPT

## 2018-04-10 PROCEDURE — 87389 HIV-1 AG W/HIV-1&-2 AB AG IA: CPT

## 2018-04-10 PROCEDURE — 85610 PROTHROMBIN TIME: CPT

## 2018-04-10 PROCEDURE — 31720 CLEARANCE OF AIRWAYS: CPT

## 2018-04-10 PROCEDURE — 94660 CPAP INITIATION&MGMT: CPT

## 2018-04-10 PROCEDURE — A9560: CPT

## 2018-04-10 PROCEDURE — C1769: CPT

## 2018-04-10 PROCEDURE — C1884: CPT

## 2018-04-10 PROCEDURE — P9045: CPT

## 2018-04-10 PROCEDURE — 93975 VASCULAR STUDY: CPT

## 2018-04-10 PROCEDURE — 83690 ASSAY OF LIPASE: CPT

## 2018-04-10 PROCEDURE — 93325 DOPPLER ECHO COLOR FLOW MAPG: CPT

## 2018-04-10 PROCEDURE — 81001 URINALYSIS AUTO W/SCOPE: CPT

## 2018-04-10 PROCEDURE — 84466 ASSAY OF TRANSFERRIN: CPT

## 2018-04-10 PROCEDURE — 84145 PROCALCITONIN (PCT): CPT

## 2018-04-10 PROCEDURE — 93308 TTE F-UP OR LMTD: CPT

## 2018-04-10 PROCEDURE — 93320 DOPPLER ECHO COMPLETE: CPT

## 2018-04-10 PROCEDURE — 88309 TISSUE EXAM BY PATHOLOGIST: CPT

## 2018-04-10 PROCEDURE — 84133 ASSAY OF URINE POTASSIUM: CPT

## 2018-04-10 PROCEDURE — 94003 VENT MGMT INPAT SUBQ DAY: CPT

## 2018-04-10 PROCEDURE — 97164 PT RE-EVAL EST PLAN CARE: CPT

## 2018-04-10 PROCEDURE — 97110 THERAPEUTIC EXERCISES: CPT

## 2018-04-10 PROCEDURE — 93306 TTE W/DOPPLER COMPLETE: CPT

## 2018-04-10 PROCEDURE — 93505 ENDOMYOCARDIAL BIOPSY: CPT

## 2018-04-10 PROCEDURE — 94640 AIRWAY INHALATION TREATMENT: CPT

## 2018-04-10 PROCEDURE — 74176 CT ABD & PELVIS W/O CONTRAST: CPT

## 2018-04-10 PROCEDURE — 85303 CLOT INHIBIT PROT C ACTIVITY: CPT

## 2018-04-10 PROCEDURE — 97167 OT EVAL HIGH COMPLEX 60 MIN: CPT

## 2018-04-10 PROCEDURE — 82150 ASSAY OF AMYLASE: CPT

## 2018-04-10 PROCEDURE — 81241 F5 GENE: CPT

## 2018-04-10 PROCEDURE — 86901 BLOOD TYPING SEROLOGIC RH(D): CPT

## 2018-04-10 PROCEDURE — 87536 HIV-1 QUANT&REVRSE TRNSCRPJ: CPT

## 2018-04-10 PROCEDURE — 71045 X-RAY EXAM CHEST 1 VIEW: CPT

## 2018-04-10 PROCEDURE — 90834 PSYTX W PT 45 MINUTES: CPT

## 2018-04-10 PROCEDURE — C1889: CPT

## 2018-04-10 PROCEDURE — 97535 SELF CARE MNGMENT TRAINING: CPT

## 2018-04-10 PROCEDURE — 82330 ASSAY OF CALCIUM: CPT

## 2018-04-10 PROCEDURE — 86140 C-REACTIVE PROTEIN: CPT

## 2018-04-10 PROCEDURE — 88305 TISSUE EXAM BY PATHOLOGIST: CPT

## 2018-04-10 PROCEDURE — 80197 ASSAY OF TACROLIMUS: CPT

## 2018-04-10 PROCEDURE — 86850 RBC ANTIBODY SCREEN: CPT

## 2018-04-10 PROCEDURE — 84300 ASSAY OF URINE SODIUM: CPT

## 2018-04-10 PROCEDURE — 86146 BETA-2 GLYCOPROTEIN ANTIBODY: CPT

## 2018-04-10 PROCEDURE — 86147 CARDIOLIPIN ANTIBODY EA IG: CPT

## 2018-04-10 PROCEDURE — 90632 HEPA VACCINE ADULT IM: CPT

## 2018-04-10 PROCEDURE — 36514 APHERESIS PLASMA: CPT

## 2018-04-10 PROCEDURE — P9016: CPT

## 2018-04-10 PROCEDURE — 81291 MTHFR GENE: CPT

## 2018-04-10 PROCEDURE — 93970 EXTREMITY STUDY: CPT

## 2018-04-10 PROCEDURE — 82728 ASSAY OF FERRITIN: CPT

## 2018-04-10 PROCEDURE — 83036 HEMOGLOBIN GLYCOSYLATED A1C: CPT

## 2018-04-10 PROCEDURE — P9059: CPT

## 2018-04-10 PROCEDURE — 93930 UPPER EXTREMITY STUDY: CPT

## 2018-04-10 PROCEDURE — P9037: CPT

## 2018-04-10 PROCEDURE — 93321 DOPPLER ECHO F-UP/LMTD STD: CPT

## 2018-04-10 PROCEDURE — 88350 IMFLUOR EA ADDL 1ANTB STN PX: CPT

## 2018-04-10 PROCEDURE — 80202 ASSAY OF VANCOMYCIN: CPT

## 2018-04-10 PROCEDURE — 93312 ECHO TRANSESOPHAGEAL: CPT

## 2018-04-10 PROCEDURE — 83615 LACTATE (LD) (LDH) ENZYME: CPT

## 2018-04-10 PROCEDURE — 88342 IMHCHEM/IMCYTCHM 1ST ANTB: CPT

## 2018-04-10 PROCEDURE — 87521 HEPATITIS C PROBE&RVRS TRNSC: CPT

## 2018-04-10 PROCEDURE — 82565 ASSAY OF CREATININE: CPT

## 2018-04-10 PROCEDURE — 73630 X-RAY EXAM OF FOOT: CPT

## 2018-04-10 PROCEDURE — 87186 SC STD MICRODIL/AGAR DIL: CPT

## 2018-04-10 PROCEDURE — 87522 HEPATITIS C REVRS TRNSCRPJ: CPT

## 2018-04-10 PROCEDURE — 87086 URINE CULTURE/COLONY COUNT: CPT

## 2018-04-10 PROCEDURE — C1751: CPT

## 2018-04-10 PROCEDURE — 86160 COMPLEMENT ANTIGEN: CPT

## 2018-04-10 PROCEDURE — 99233 SBSQ HOSP IP/OBS HIGH 50: CPT | Mod: GC

## 2018-04-10 PROCEDURE — 71045 X-RAY EXAM CHEST 1 VIEW: CPT | Mod: 26

## 2018-04-10 PROCEDURE — 81003 URINALYSIS AUTO W/O SCOPE: CPT

## 2018-04-10 PROCEDURE — 86644 CMV ANTIBODY: CPT

## 2018-04-10 PROCEDURE — 36430 TRANSFUSION BLD/BLD COMPNT: CPT

## 2018-04-10 PROCEDURE — 83521 IG LIGHT CHAINS FREE EACH: CPT

## 2018-04-10 PROCEDURE — 86334 IMMUNOFIX E-PHORESIS SERUM: CPT

## 2018-04-10 PROCEDURE — 86905 BLOOD TYPING RBC ANTIGENS: CPT

## 2018-04-10 PROCEDURE — 85014 HEMATOCRIT: CPT

## 2018-04-10 PROCEDURE — 93005 ELECTROCARDIOGRAM TRACING: CPT

## 2018-04-10 PROCEDURE — 82435 ASSAY OF BLOOD CHLORIDE: CPT

## 2018-04-10 PROCEDURE — 85300 ANTITHROMBIN III ACTIVITY: CPT

## 2018-04-10 PROCEDURE — C1817: CPT

## 2018-04-10 PROCEDURE — 82570 ASSAY OF URINE CREATININE: CPT

## 2018-04-10 PROCEDURE — 82595 ASSAY OF CRYOGLOBULIN: CPT

## 2018-04-10 PROCEDURE — P9017: CPT

## 2018-04-10 PROCEDURE — C1894: CPT

## 2018-04-10 PROCEDURE — 88313 SPECIAL STAINS GROUP 2: CPT

## 2018-04-10 PROCEDURE — 82962 GLUCOSE BLOOD TEST: CPT

## 2018-04-10 PROCEDURE — 85302 CLOT INHIBIT PROT C ANTIGEN: CPT

## 2018-04-10 PROCEDURE — 85027 COMPLETE CBC AUTOMATED: CPT

## 2018-04-10 PROCEDURE — 87205 SMEAR GRAM STAIN: CPT

## 2018-04-10 PROCEDURE — 82436 ASSAY OF URINE CHLORIDE: CPT

## 2018-04-10 PROCEDURE — 97530 THERAPEUTIC ACTIVITIES: CPT

## 2018-04-10 PROCEDURE — 99233 SBSQ HOSP IP/OBS HIGH 50: CPT

## 2018-04-10 PROCEDURE — 85307 ASSAY ACTIVATED PROTEIN C: CPT

## 2018-04-10 PROCEDURE — 73120 X-RAY EXAM OF HAND: CPT

## 2018-04-10 PROCEDURE — 83090 ASSAY OF HOMOCYSTEINE: CPT

## 2018-04-10 PROCEDURE — 84132 ASSAY OF SERUM POTASSIUM: CPT

## 2018-04-10 PROCEDURE — 84443 ASSAY THYROID STIM HORMONE: CPT

## 2018-04-10 PROCEDURE — 87075 CULTR BACTERIA EXCEPT BLOOD: CPT

## 2018-04-10 PROCEDURE — C1768: CPT

## 2018-04-10 PROCEDURE — 76937 US GUIDE VASCULAR ACCESS: CPT

## 2018-04-10 PROCEDURE — 80053 COMPREHEN METABOLIC PANEL: CPT

## 2018-04-10 PROCEDURE — 90746 HEPB VACCINE 3 DOSE ADULT IM: CPT

## 2018-04-10 PROCEDURE — 85384 FIBRINOGEN ACTIVITY: CPT

## 2018-04-10 PROCEDURE — 87902 NFCT AGT GNTYP ALYS HEP C: CPT

## 2018-04-10 PROCEDURE — P9011: CPT

## 2018-04-10 PROCEDURE — 85301 ANTITHROMBIN III ANTIGEN: CPT

## 2018-04-10 PROCEDURE — 85613 RUSSELL VIPER VENOM DILUTED: CPT

## 2018-04-10 PROCEDURE — P9047: CPT

## 2018-04-10 PROCEDURE — 94002 VENT MGMT INPAT INIT DAY: CPT

## 2018-04-10 RX ORDER — SODIUM POLYSTYRENE SULFONATE 4.1 MEQ/G
15 POWDER, FOR SUSPENSION ORAL
Qty: 450 | Refills: 0 | OUTPATIENT
Start: 2018-04-10 | End: 2018-05-09

## 2018-04-10 RX ORDER — MAGNESIUM OXIDE 400 MG ORAL TABLET 241.3 MG
3 TABLET ORAL
Qty: 90 | Refills: 1 | OUTPATIENT
Start: 2018-04-10 | End: 2018-06-08

## 2018-04-10 RX ORDER — DILTIAZEM HCL 120 MG
1 CAPSULE, EXT RELEASE 24 HR ORAL
Qty: 30 | Refills: 1 | OUTPATIENT
Start: 2018-04-10 | End: 2018-06-08

## 2018-04-10 RX ORDER — TACROLIMUS 5 MG/1
10 CAPSULE ORAL
Qty: 0 | Refills: 1 | COMMUNITY
Start: 2018-04-10 | End: 2018-06-08

## 2018-04-10 RX ORDER — TACROLIMUS 5 MG/1
9 CAPSULE ORAL
Qty: 270 | Refills: 1 | OUTPATIENT
Start: 2018-04-10 | End: 2018-06-08

## 2018-04-10 RX ORDER — MAGNESIUM OXIDE 400 MG ORAL TABLET 241.3 MG
1 TABLET ORAL
Qty: 0 | Refills: 1 | COMMUNITY
Start: 2018-04-10 | End: 2018-06-08

## 2018-04-10 RX ORDER — TACROLIMUS 5 MG/1
10 CAPSULE ORAL
Qty: 300 | Refills: 0 | OUTPATIENT
Start: 2018-04-10 | End: 2018-05-09

## 2018-04-10 RX ORDER — SODIUM POLYSTYRENE SULFONATE 4.1 MEQ/G
15 POWDER, FOR SUSPENSION ORAL ONCE
Qty: 0 | Refills: 0 | Status: COMPLETED | OUTPATIENT
Start: 2018-04-10 | End: 2018-04-10

## 2018-04-10 RX ADMIN — Medication 1 TABLET(S): at 12:13

## 2018-04-10 RX ADMIN — Medication 240 MILLIGRAM(S): at 05:27

## 2018-04-10 RX ADMIN — TACROLIMUS 9 MILLIGRAM(S): 5 CAPSULE ORAL at 08:01

## 2018-04-10 RX ADMIN — ENOXAPARIN SODIUM 70 MILLIGRAM(S): 100 INJECTION SUBCUTANEOUS at 09:21

## 2018-04-10 RX ADMIN — Medication 3 MILLILITER(S): at 12:15

## 2018-04-10 RX ADMIN — FAMOTIDINE 20 MILLIGRAM(S): 10 INJECTION INTRAVENOUS at 12:15

## 2018-04-10 RX ADMIN — Medication 500000 UNIT(S): at 05:26

## 2018-04-10 RX ADMIN — SODIUM POLYSTYRENE SULFONATE 15 GRAM(S): 4.1 POWDER, FOR SUSPENSION ORAL at 14:12

## 2018-04-10 RX ADMIN — VALGANCICLOVIR 450 MILLIGRAM(S): 450 TABLET, FILM COATED ORAL at 08:01

## 2018-04-10 RX ADMIN — Medication 650 MILLIGRAM(S): at 09:19

## 2018-04-10 RX ADMIN — ATOVAQUONE 1500 MILLIGRAM(S): 750 SUSPENSION ORAL at 12:15

## 2018-04-10 RX ADMIN — Medication 15 MILLIGRAM(S): at 08:01

## 2018-04-10 RX ADMIN — MAGNESIUM OXIDE 400 MG ORAL TABLET 1200 MILLIGRAM(S): 241.3 TABLET ORAL at 08:00

## 2018-04-10 RX ADMIN — Medication 3 MILLILITER(S): at 05:26

## 2018-04-10 RX ADMIN — Medication 2: at 12:21

## 2018-04-10 RX ADMIN — Medication 650 MILLIGRAM(S): at 09:49

## 2018-04-10 RX ADMIN — Medication 81 MILLIGRAM(S): at 14:11

## 2018-04-10 RX ADMIN — MYCOPHENOLATE MOFETIL 1000 MILLIGRAM(S): 250 CAPSULE ORAL at 08:01

## 2018-04-10 RX ADMIN — Medication 1 APPLICATION(S): at 05:27

## 2018-04-10 RX ADMIN — Medication 500000 UNIT(S): at 12:15

## 2018-04-10 RX ADMIN — Medication 650 MILLIGRAM(S): at 12:13

## 2018-04-10 RX ADMIN — Medication 650 MILLIGRAM(S): at 05:26

## 2018-04-10 NOTE — PROGRESS NOTE ADULT - PROBLEM/PLAN-1
DISPLAY PLAN FREE TEXT

## 2018-04-10 NOTE — PROGRESS NOTE ADULT - PROBLEM SELECTOR PLAN 5
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   -Creatinine stable today, overall downtrending  -He continues on bicarbonate 650mg TID and magnesium 1200mg BID  -Kayexelate given for hyperakalemia today. Will be discharged with kayexelate PRN to be taken following instruction by transplant team upon review of outpatient labs.  -low potassium diet  - CMP, Mg to be drawn Thursday morning.

## 2018-04-10 NOTE — PROGRESS NOTE ADULT - PROBLEM SELECTOR PLAN 8
He will need follow up with the following specialists outpatient:  - Dr. Thomas with hepatology for evaluation and management of hep C  - Dr. Tavarez, vascular cardiology, for follow up of UE DVT  - Plastics regarding R 3rd digit wound  - Dr. MANISH Kiran, urology, regarding renal cyst noted on renal ultrasound

## 2018-04-10 NOTE — PROGRESS NOTE ADULT - SUBJECTIVE AND OBJECTIVE BOX
[facility] DIVISION OF KIDNEY DISEASES AND HYPERTENSION -- FOLLOW UP NOTE  --------------------------------------------------------------------------------  Chief Complaint:  NORMAN    24 hour events/subjective:  Pt feeling well. No acute complaints.      PAST HISTORY  --------------------------------------------------------------------------------  No significant changes to PMH, PSH, FHx, SHx, unless otherwise noted    ALLERGIES & MEDICATIONS  --------------------------------------------------------------------------------  Allergies    No Known Allergies    Intolerances      Standing Inpatient Medications  ALBUTerol/ipratropium for Nebulization 3 milliLiter(s) Nebulizer every 6 hours  aspirin enteric coated 81 milliGRAM(s) Oral daily  atovaquone Suspension 1500 milliGRAM(s) Oral daily  Calcium Citrate + Vit D, 315 mg/200 Unit 2 Tablet(s) 2 Tablet(s) Oral two times a day  diltiazem    milliGRAM(s) Oral daily  docusate sodium 100 milliGRAM(s) Oral three times a day  enoxaparin Injectable 70 milliGRAM(s) SubCutaneous <User Schedule>  famotidine    Tablet 20 milliGRAM(s) Oral daily  insulin lispro (HumaLOG) corrective regimen sliding scale   SubCutaneous three times a day before meals  insulin lispro (HumaLOG) corrective regimen sliding scale   SubCutaneous at bedtime  lidocaine 2% Injectable 20 milliLiter(s) Local Injection once  magnesium oxide 1200 milliGRAM(s) Oral <User Schedule>  multivitamin 1 Tablet(s) Oral daily  mycophenolate mofetil 1000 milliGRAM(s) Oral <User Schedule>  nystatin    Suspension [id number] Unit(s) Oral every 6 hours  pravastatin 20 milliGRAM(s) Oral at bedtime  predniSONE   Tablet 15 milliGRAM(s) Oral <User Schedule>  silver sulfADIAZINE 1% Cream 1 Application(s) Topical two times a day  sodium bicarbonate 650 milliGRAM(s) Oral three times a day  sodium polystyrene sulfonate Suspension 15 Gram(s) Oral once  tacrolimus 9 milliGRAM(s) Oral <User Schedule>  tacrolimus 10 milliGRAM(s) Oral <User Schedule>  valGANciclovir 450 milliGRAM(s) Oral <User Schedule>    PRN Inpatient Medications  acetaminophen   Tablet. 650 milliGRAM(s) Oral every 6 hours PRN      REVIEW OF SYSTEMS  --------------------------------------------------------------------------------  Gen: no fatigue  Skin: No rashes  Respiratory: No dyspnea, cough  CV: No chest pain, orthopnea  GI: No abdominal pain, diarrhea, constipation, nausea, vomiting  : No dysuria  MSK: No joint pain/swelling; no edema  Neuro: no confusion    VITALS/PHYSICAL EXAM  --------------------------------------------------------------------------------  T(C): 36.7 (04-10-18 @ 11:33), Max: 36.7 (04-09-18 @ 15:29)  HR: 100 (04-10-18 @ 11:33) (100 - 110)  BP: 131/81 (04-10-18 @ 11:33) (121/81 - 141/79)  RR: 18 (04-10-18 @ 11:33) (18 - 18)  SpO2: 100% (04-10-18 @ 11:33) (97% - 100%)  Wt(kg): --        04-09-18 @ 07:01  -  04-10-18 @ 07:00  --------------------------------------------------------  IN: 1140 mL / OUT: 1600 mL / NET: -460 mL    04-10-18 @ 07:01  -  04-10-18 @ 13:51  --------------------------------------------------------  IN: 780 mL / OUT: 250 mL / NET: 530 mL      Physical Exam:  	Gen: NAD, well-appearing  	Pulm: CTA B/L  	CV: RRR, S1S2; no rub  	Abd: +BS, soft, nontender/nondistended  	UE: Warm, no edema  	LE: Warm, no edema  	Neuro: No focal deficits  	Psych: Normal affect and mood  	Skin: Warm, without rashes    LABS/STUDIES  --------------------------------------------------------------------------------              8.6    13.7  >-----------<  302      [04-10-18 @ 07:32]              27.4     141  |  105  |  32  ----------------------------<  127      [04-10-18 @ 07:31]  5.1   |  23  |  1.35        Ca     8.8     [04-10-18 @ 07:31]      Mg     2.1     [04-10-18 @ 07:31]    TPro  6.4  /  Alb  3.4  /  TBili  0.4  /  DBili  x   /  AST  26  /  ALT  52  /  AlkPhos  84  [04-10-18 @ 07:31]          Creatinine Trend:  SCr 1.35 [04-10 @ 07:31]  SCr 1.31 [04-09 @ 07:39]  SCr 1.51 [04-08 @ 07:44]  SCr 1.55 [04-07 @ 07:36]  SCr 2.08 [04-06 @ 07:31]    Urinalysis - [03-23-18 @ 16:03]      Color Yellow / Appearance Slightly Turbid / SG 1.020 / pH 5.0      Gluc Negative / Ketone Negative  / Bili Negative / Urobili Negative       Blood Small / Protein 30 / Leuk Est Small / Nitrite Negative      RBC 5 / WBC 8 / Hyaline 0 / Gran  / Sq Epi  / Non Sq Epi 4 / Bacteria Few      Iron 22, TIBC 182, %sat 12      [02-13-18 @ 12:44]  Ferritin 79.0      [02-13-18 @ 12:44]  HbA1c 5.3      [03-18-18 @ 11:20]  TSH 1.48      [02-27-18 @ 08:13]  Lipid: chol 62, TG 33, HDL 17, LDL 38      [06-07-17 @ 06:14]      C3 Complement 135      [03-19-18 @ 15:13]  C4 Complement 37      [03-19-18 @ 15:13]  Free Light Chains: kappa 4.36, lambda 5.71, ratio = 0.76      [03-19 @ 15:13]  Immunofixation Serum:   No Monoclonal Band Identified      [03-19-18 @ 15:13]  Cryoglobulin: Negative      [03-19-18 @ 15:13]

## 2018-04-10 NOTE — PROGRESS NOTE ADULT - ATTENDING COMMENTS
Doing well. No complaints. Tacro level 10.1 (level plateauing). Plan to be d/c'ed with repeat labs Thursday.   - continue valcyte daily(renally dosed)  - on prograf to 9/10; f/u level Thursday  - hyperkalemia issue improved; pt had been drinking Ensure which contains potassium; kayexalate today; will look into [place]; labs Thursday  - d/c today; next biopsy 4/19

## 2018-04-10 NOTE — PROGRESS NOTE ADULT - SUBJECTIVE AND OBJECTIVE BOX
Behavioral Cardiology Progress Note     HPI:  Mr. Baez is a 67 year-old man, , only son . Owns house he lives in with his brother.  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  No PPH.     Current stressors:   Hospitalization   s/p Heart transplant (18)      Support system/family support: Good support from family and close friend      Coping strategies: Talking with family and staff, watching TV, his pastora, talking to his granddaughter     Understanding of medical illness and treatment plan:   Ongoing education on medication management provided by HT coordinator and other team members. Strategies being utilized to accommodate limited literacy (pictures of medication, frequent teach-back).  Good understanding of need for lifelong immunosuppressant medication, importance of calling HT coordinator.  Benefits from frequent review and teach-back of all education.     MSE:  Seen sitting in chair. A&Ox3.  Well-related with good eye contact. Thought process goal directed. No evidence of any psychosis, delusions, jona. Denies s/i.   Mood "I can’t wait to see my granddaughter." Affect full range. Insight and judgment adequate.

## 2018-04-10 NOTE — PROGRESS NOTE ADULT - PROBLEM SELECTOR PLAN 6
-Continue atovaquone 1500mg PO daily for PJP and toxoplasmosis prophylaxis  -Creatinine clearance 55.3, valganciclovir 450mg daily for CMV prophylaxis (intermediate risk).   -Continue nystatin for thrush prophylaxis

## 2018-04-10 NOTE — PROGRESS NOTE ADULT - ASSESSMENT
Mr. Baez is a 67 year old man with ACC/AHA stage D chronic systolic heart failure due to NICM s/p HM2 LVAD 6/17 c/b pump thrombosis now s/p heart transplant on 2/23 (CMV D-/R+ and Toxo D-/R+), with post-op course c/b primary graft dysfunction, initially thought to be immune-mediated due to positive B-cell flow (negative CDC cross match) and received plasmapharesis/IVIg x2 with improvement in LV function. His course has been complicated by bilateral IJ/subclavian thrombi and he has a persistent small  right sided pleural effusion as well as chronic renal failure, thought to be related to Prograf.     His echocardiogram on 4/3 showed evidence of slight worsened LV strain, LVEF of 52%, and slightly diminished right ventricular systolic function. It was determined that he missed five doses of prednisone. RHC on 4/4 showed elevated biventricular filling pressures with normal cardiac output. Biopsy showed 1R/1A cellular rejection. C4D continues to be diffusely positive. DSA is unremarkable.     His tacrolimus level today is 10.1, from 10.0 yesterday, and 11.9 on 4/8,this is in the setting of improving renal function and a decreased dose of diltiazem. Due to his renal dysfunction, he has a slightly lower tacro goal level of 10-12. His hyperkalemia is improved today with K 5.1 this morning. Overall doing well and stable for discharge home.

## 2018-04-10 NOTE — PROGRESS NOTE ADULT - SUBJECTIVE AND OBJECTIVE BOX
Subjective:  Reports feeling well today. Notes day by day improvement in activity tolerance. Denies LOBATO walking around nursing unit. Denies orthopnea, PND, CP, palpitations, lightheadedness, dizziness, and nausea.     Medications:  acetaminophen   Tablet. 650 milliGRAM(s) Oral every 6 hours PRN  ALBUTerol/ipratropium for Nebulization 3 milliLiter(s) Nebulizer every 6 hours  aspirin enteric coated 81 milliGRAM(s) Oral daily  atovaquone Suspension 1500 milliGRAM(s) Oral daily  Calcium Citrate + Vit D, 315 mg/200 Unit 2 Tablet(s) 2 Tablet(s) Oral two times a day  diltiazem    milliGRAM(s) Oral daily  docusate sodium 100 milliGRAM(s) Oral three times a day  enoxaparin Injectable 70 milliGRAM(s) SubCutaneous <User Schedule>  famotidine    Tablet 20 milliGRAM(s) Oral daily  insulin lispro (HumaLOG) corrective regimen sliding scale   SubCutaneous three times a day before meals  insulin lispro (HumaLOG) corrective regimen sliding scale   SubCutaneous at bedtime  lidocaine 2% Injectable 20 milliLiter(s) Local Injection once  magnesium oxide 1200 milliGRAM(s) Oral <User Schedule>  multivitamin 1 Tablet(s) Oral daily  mycophenolate mofetil 1000 milliGRAM(s) Oral <User Schedule>  nystatin    Suspension [id number] Unit(s) Oral every 6 hours  pravastatin 20 milliGRAM(s) Oral at bedtime  predniSONE   Tablet 15 milliGRAM(s) Oral <User Schedule>  silver sulfADIAZINE 1% Cream 1 Application(s) Topical two times a day  sodium bicarbonate 650 milliGRAM(s) Oral three times a day  tacrolimus 9 milliGRAM(s) Oral <User Schedule>  tacrolimus 10 milliGRAM(s) Oral <User Schedule>  valGANciclovir 450 milliGRAM(s) Oral <User Schedule>      Physical Exam:    Vitals:  Vital Signs Last 24 Hours  T(C): 36.6 (04-10-18 @ 15:37), Max: 36.7 (04-10-18 @ 04:10)  HR: 106 (04-10-18 @ 15:37) (100 - 110)  BP: 121/82 (04-10-18 @ 15:37) (121/81 - 141/79)  RR: 18 (04-10-18 @ 15:37) (18 - 18)  SpO2: 99% (04-10-18 @ 15:37) (97% - 100%)    Weight in k.7 (04-10 @ 04:10)    I&O's Summary    2018 07:01  -  10 Apr 2018 07:00  --------------------------------------------------------  IN: 1140 mL / OUT: 1600 mL / NET: -460 mL    10 Apr 2018 07:01  -  10 Apr 2018 17:39  --------------------------------------------------------  IN: 780 mL / OUT: 250 mL / NET: 530 mL    Tele: ST 1st deg AV block -120    General: OOB in chair. No distress. Comfortable.  Neck: Neck supple. JVP not elevated. No masses  Chest: Clear to auscultation bilaterally  CV: Normal S1 and S2. No murmurs, rub, or gallops. Radial pulses normal. No LE edema.  Abdomen: Soft, non-distended, non-tender  Skin: Right abdominal wound vac site clean, dry, and intact. Right third digit dressing clean, dry, and intact  Neurology: Alert and oriented times three. Sensation intact  Psych: Affect normal    Labs:                        8.6    13.7  )-----------( 302      ( 10 Apr 2018 07:32 )             27.4     10    141  |  105  |  32<H>  ----------------------------<  127<H>  5.1   |  23  |  1.35<H>    Ca    8.8      10 Apr 2018 07:31  Mg     2.1     04-10    TPro  6.4  /  Alb  3.4  /  TBili  0.4  /  DBili  x   /  AST  26  /  ALT  52<H>  /  AlkPhos  84  04-10    Tacrolimus (), Serum (04.10.18 @ 09:24)    Tacrolimus (), Serum: 10.1  Tacrolimus (), Serum (18 @ 09:24)    Tacrolimus (), Serum: 10.0  Tacrolimus (), Serum (18 @ 08:52)    Tacrolimus (), Serum: 11.9

## 2018-04-10 NOTE — PROGRESS NOTE ADULT - PROBLEM/PLAN-2
DISPLAY PLAN FREE TEXT

## 2018-04-10 NOTE — PROGRESS NOTE ADULT - PROBLEM SELECTOR PROBLEM 7
Ventricular tachycardia (paroxysmal)
Cardiac allograft vasculopathy
Ventricular tachycardia (paroxysmal)
Leukocytosis
Ventricular tachycardia (paroxysmal)
Cardiac allograft vasculopathy
Heart transplant recipient
Leukocytosis
Ventricular tachycardia (paroxysmal)
Cardiac allograft vasculopathy
Ventricular tachycardia (paroxysmal)
Heart transplant recipient
Leukocytosis

## 2018-04-10 NOTE — PROGRESS NOTE ADULT - PROBLEM/PLAN-3
DISPLAY PLAN FREE TEXT

## 2018-04-10 NOTE — PROGRESS NOTE ADULT - PROBLEM SELECTOR PLAN 1
-Continue current steroid dose at prednisone 15 mg PO BID.   -DSA was negative. We will consider the possibility of non-HLA antibodies leading to the persistent C4D positivity given persistent allograft dysfunction.  - Next EMB to be done 4/19

## 2018-04-10 NOTE — PROGRESS NOTE ADULT - ASSESSMENT
Very happy about going home today, is looking forward to "being in my own home." Family has prepared home in anticipation of his homecoming (cleaning, moving bed to first floor).  Was able to teach back information on importance of taking all medication as prescribed.  Has strong support from close friend (Tahira) who will oversee his medication management.  Reviewed psychoeducation on possible occurrence of mood shifts as a result of steroid use. Understands importance of staying in close communication with HT team.  Strong support system (close friend, brothers).    Dx: Adjustment disorder. [id number]

## 2018-04-10 NOTE — PROGRESS NOTE ADULT - PROBLEM SELECTOR PLAN 2
-Continue prednisone at 15 mg PO BID.   -Continue CellCept 1000 mg BID.  -Continue tacrolimus to 9 mg in the AM and 10mg in the PM. Continue the diltiazem at current dose of 240 mg daily. Tacro level, CMP, Mg, CBC to be drawn Thursday morning prior to AM dose at [facility] outpatient lab.  - Continue famotidine 20mg daily for stress ulcer ppx, calcium citrate + vit D 2 tabs BID for osteoporosis ppx, continue MVI 1 tab daily

## 2018-04-10 NOTE — PROGRESS NOTE ADULT - ASSESSMENT
67 year old man with ACC/AHA stage D chronic systolic heart failure due to NICM (LVEF 20%) s/p HM2 LVAD 6/17 c/b pump thrombus now s/p OHT 2/23 (CMV D-/R+ and Toxo D-/R+), with post-op course c/b primary graft dysfunction, initially thought to be immune-mediated due to positive B-cell flow (negative CDC cross match) and received plasmapheresis/IVIG x2 with improvement in LV function. Found to have b/l IJ/subclavian thrombi as well. CrCl somewhat decreased from baseline pre-tx.    1) Antimicrobial prophylaxis:  Intermediate risk CMV - Valcyte 450mg adjusted to tiw based upon rising creatinine  Intermediate risk Toxo/PCP - continue Mepron 1500mg PO daily with food.  Thrush - continue Nystatin S/S 5 mL Qid  HCV+ donor/HCV- recipient; HCV (1a) - 3/27 HCV PCR 32,891 (To be treated outpatient, monitoring weekly)      2) Leukocytosis improved    stable for discharge home from an ID perspective        Gary Lujan MD  [phone]  After 5pm/weekends [phone]

## 2018-04-10 NOTE — PROGRESS NOTE ADULT - SUBJECTIVE AND OBJECTIVE BOX
INFECTIOUS DISEASES FOLLOW UP-- Sujey Lujan  [phone]    This is a follow up note for this  67yMale with s/p orthotopic heart transplant with plans for discharge home today      ROS:  CONSTITUTIONAL:  No fever, good appetite  CARDIOVASCULAR:  No chest pain or palpitations  RESPIRATORY:  No dyspnea  GASTROINTESTINAL:  No nausea, vomiting, diarrhea, or abdominal pain  GENITOURINARY:  No dysuria  NEUROLOGIC:  No headache,     Allergies    No Known Allergies    Intolerances        ANTIBIOTICS/RELEVANT:  antimicrobials  atovaquone Suspension 1500 milliGRAM(s) Oral daily  nystatin    Suspension 258998 Unit(s) Oral every 6 hours  valGANciclovir 450 milliGRAM(s) Oral <User Schedule>    immunologic:  mycophenolate mofetil 1000 milliGRAM(s) Oral <User Schedule>  tacrolimus 9 milliGRAM(s) Oral <User Schedule>  tacrolimus 10 milliGRAM(s) Oral <User Schedule>    OTHER:  acetaminophen   Tablet. 650 milliGRAM(s) Oral every 6 hours PRN  ALBUTerol/ipratropium for Nebulization 3 milliLiter(s) Nebulizer every 6 hours  aspirin enteric coated 81 milliGRAM(s) Oral daily  Calcium Citrate + Vit D, 315 mg/200 Unit 2 Tablet(s) 2 Tablet(s) Oral two times a day  diltiazem    milliGRAM(s) Oral daily  docusate sodium 100 milliGRAM(s) Oral three times a day  enoxaparin Injectable 70 milliGRAM(s) SubCutaneous <User Schedule>  famotidine    Tablet 20 milliGRAM(s) Oral daily  insulin lispro (HumaLOG) corrective regimen sliding scale   SubCutaneous three times a day before meals  insulin lispro (HumaLOG) corrective regimen sliding scale   SubCutaneous at bedtime  lidocaine 2% Injectable 20 milliLiter(s) Local Injection once  magnesium oxide 1200 milliGRAM(s) Oral <User Schedule>  multivitamin 1 Tablet(s) Oral daily  pravastatin 20 milliGRAM(s) Oral at bedtime  predniSONE   Tablet 15 milliGRAM(s) Oral <User Schedule>  silver sulfADIAZINE 1% Cream 1 Application(s) Topical two times a day  sodium bicarbonate 650 milliGRAM(s) Oral three times a day      Objective:  Vital Signs Last 24 Hrs  T(C): 36.6 (10 Apr 2018 15:37), Max: 36.7 (10 Apr 2018 04:10)  T(F): 97.8 (10 Apr 2018 15:37), Max: 98 (10 Apr 2018 04:10)  HR: 106 (10 Apr 2018 15:37) (100 - 110)  BP: 121/82 (10 Apr 2018 15:37) (121/81 - 141/79)  BP(mean): 96 (09 Apr 2018 19:22) (96 - 96)  RR: 18 (10 Apr 2018 15:37) (18 - 18)  SpO2: 99% (10 Apr 2018 15:37) (97% - 100%)    PHYSICAL EXAM:  Constitutional:no acute distress  Eyes:WALT, EOMI  Ear/Nose/Throat: no oral lesions, 	  Respiratory: clear BL decreased at bases  Cardiovascular: S1S2  Gastrointestinal:soft, (+) BS, no tenderness  Extremities:no e/e/c  No Lymphadenopathy  IV sites not inflammed.    LABS:                        8.6    13.7  )-----------( 302      ( 10 Apr 2018 07:32 )             27.4     04-10    141  |  105  |  32<H>  ----------------------------<  127<H>  5.1   |  23  |  1.35<H>    Ca    8.8      10 Apr 2018 07:31  Mg     2.1     04-10    TPro  6.4  /  Alb  3.4  /  TBili  0.4  /  DBili  x   /  AST  26  /  ALT  52<H>  /  AlkPhos  84  04-10          MICROBIOLOGY:            RECENT CULTURES:      RADIOLOGY & ADDITIONAL STUDIES:  < from: Xray Chest 1 View- PORTABLE-Routine (04.10.18 @ 05:50) >  IMPRESSION:   Unchanged right pleural effusion.    < end of copied text >

## 2018-04-10 NOTE — PROGRESS NOTE ADULT - PROBLEM SELECTOR PLAN 4
-Continue subcutaneous enoxaparin 80mg BID  -Vascular cardiology follow up after discharge scheduled with Dr. Tavarez

## 2018-04-10 NOTE — PROGRESS NOTE ADULT - PROBLEM SELECTOR PLAN 3
On ultrasound, this does not appear large.   Appearance may be related to elevated right diaphragm.  CXR to be done 4/19 to further monitor.

## 2018-04-10 NOTE — PROGRESS NOTE ADULT - PROBLEM SELECTOR PLAN 2
in the setting of NORMAN and tacrolimus use. Serum potassium to 5.1 today. Planned to be discharged on kayexalate 15mg PO daily. Will have labwork checked 2xs/week. Needs to stay on low K diet. in the setting of NORMAN and tacrolimus use. Serum potassium to 5.1 today. Agree with dose of kayexalate today. Planned to be discharged on kayexalate 15mg PO daily as needed. Will have labwork checked 2xs/week. Needs to stay on low K diet.

## 2018-04-10 NOTE — PROGRESS NOTE ADULT - PROBLEM SELECTOR PLAN 1
Pt. with hemodynamically mediated NORMAN in the setting of post cardiac transplant, and tacrolimus, and bactrim use. Off bactrim now and with reduced dose of Tacrolimus. Non-oliguric, with SCr greatly improved. Monitor BMP. Strict I/Os. Avoid nephrotoxins.

## 2018-04-10 NOTE — PROGRESS NOTE ADULT - I WAS PHYSICALLY PRESENT FOR THE KEY PORTIONS OF THE EVALUATION AND MANAGEMENT (E/M) SERVICE PROVIDED.  I AGREE WITH THE ABOVE HISTORY, PHYSICAL, AND PLAN WHICH I HAVE REVIEWED AND EDITED WHERE APPROPRIATE

## 2018-04-10 NOTE — PROGRESS NOTE ADULT - SUBJECTIVE AND OBJECTIVE BOX
Learner: Patient and his brother   Barriers: the patient cannot rely. We rely heavily on Tahira, his friend, and his brother for medications administration.     Patient received a cardiac transplant.    Method used: Verbal discussion and written materials    Medication safety was discussed with the patient and his brother: allergies, herbals, adherence, interactions, medication precautions, miss dose instructions, purpose, side effects, signs and symptoms to report, and storage & handling    Patient and the brother were able to repeat and verbalize key points    Education Summary: Discharge immunosuppressant medications and prophylatic anti-infective agents reviewed with the patient. Outpatient medication schedule was discussed in detail including: medication name, indication, dose, administration times, treatment duration, side effects, drug interactions, and special instructions. Patient questions and concerns were answered and addressed. Patient and the brother demonstrated understanding. The pill box was filled with medications for 2 weeks. The patient, and the brother understand the importance of taking medications. They also know who they can contact if any questions/concerns arise.     Time spent discharge education: 60 minutes    MEDICATIONS  (STANDING):    MEDICATIONS  (PRN):      Cardiac Transplant Medications:  Tacrolimus adjusted to trough (9 mg AM and 10 mg HS) - the pill box was updated for 2 weeks. The patient is coming on Thursday to obtain the trough - the dose will be adjusted if necessary. The patient also received a PRN supply of Kayexalate (the patient and his brother understand that this medication only will be used if hyperkalemia will be present).   Mycophenolate mofetil 1,000 mg PO twice a day  Prednisone taper 15 mg PO BID  Sulfamethoxazole/Trimethoprim 1SS daily - changed to atovaquone 1,500 mg PO daily (to minimize hyperkalmia)  Nystatin 500,000 units (5 mL) swish and swallow four times a day  Valganciclovir 450 mg 1 tablet PO daily   Docusate and Senna                          8.6    13.7  )-----------( 302      ( 10 Apr 2018 07:32 )             27.4     04-10    141  |  105  |  32<H>  ----------------------------<  127<H>  5.1   |  23  |  1.35<H>    Ca    8.8      10 Apr 2018 07:31  Mg     2.1     04-10    TPro  6.4  /  Alb  3.4  /  TBili  0.4  /  DBili  x   /  AST  26  /  ALT  52<H>  /  AlkPhos  84  04-10    LIVER FUNCTIONS - ( 10 Apr 2018 07:31 )  Alb: 3.4 g/dL / Pro: 6.4 g/dL / ALK PHOS: 84 U/L / ALT: 52 U/L RC / AST: 26 U/L / GGT: x           Tacrolimus (), Serum: 10.1 ng/mL (04-10-18 @ 09:24)  Tacrolimus (), Serum: 10.0 ng/mL (04-09-18 @ 09:24)

## 2018-04-11 ENCOUNTER — OTHER (OUTPATIENT)
Age: 68
End: 2018-04-11

## 2018-04-11 DIAGNOSIS — Z01.818 ENCOUNTER FOR OTHER PREPROCEDURAL EXAMINATION: ICD-10-CM

## 2018-04-11 DIAGNOSIS — Z86.79 PERSONAL HISTORY OF OTHER DISEASES OF THE CIRCULATORY SYSTEM: ICD-10-CM

## 2018-04-11 DIAGNOSIS — Z95.811 PRESENCE OF HEART ASSIST DEVICE: ICD-10-CM

## 2018-04-11 DIAGNOSIS — I48.0 PAROXYSMAL ATRIAL FIBRILLATION: ICD-10-CM

## 2018-04-11 DIAGNOSIS — I42.0 DILATED CARDIOMYOPATHY: ICD-10-CM

## 2018-04-11 DIAGNOSIS — I50.20 UNSPECIFIED SYSTOLIC (CONGESTIVE) HEART FAILURE: ICD-10-CM

## 2018-04-11 RX ORDER — MULTIVIT-MIN/FOLIC/VIT K/LYCOP 400-300MCG
500 TABLET ORAL
Qty: 180 | Refills: 3 | Status: DISCONTINUED | COMMUNITY
Start: 2017-09-05 | End: 2018-04-11

## 2018-04-11 RX ORDER — FUROSEMIDE 20 MG/1
20 TABLET ORAL
Qty: 90 | Refills: 3 | Status: DISCONTINUED | COMMUNITY
Start: 2017-09-07 | End: 2018-04-11

## 2018-04-11 RX ORDER — SPIRONOLACTONE 25 MG/1
25 TABLET ORAL
Qty: 30 | Refills: 3 | Status: DISCONTINUED | COMMUNITY
Start: 2017-09-07 | End: 2018-04-11

## 2018-04-11 RX ORDER — CARVEDILOL 25 MG/1
25 TABLET, FILM COATED ORAL TWICE DAILY
Qty: 180 | Refills: 3 | Status: DISCONTINUED | COMMUNITY
Start: 2018-01-11 | End: 2018-04-11

## 2018-04-11 RX ORDER — QUETIAPINE FUMARATE 50 MG/1
50 TABLET ORAL
Qty: 90 | Refills: 0 | Status: DISCONTINUED | COMMUNITY
Start: 2017-08-10 | End: 2018-04-11

## 2018-04-11 RX ORDER — HYDROCORTISONE 25 MG/G
2.5 CREAM TOPICAL
Qty: 1 | Refills: 0 | Status: DISCONTINUED | COMMUNITY
Start: 2017-09-07 | End: 2018-04-11

## 2018-04-11 RX ORDER — MEXILETINE HYDROCHLORIDE 150 MG/1
150 CAPSULE ORAL 3 TIMES DAILY
Qty: 90 | Refills: 5 | Status: DISCONTINUED | COMMUNITY
Start: 2017-03-01 | End: 2018-04-11

## 2018-04-11 RX ORDER — AMIODARONE HYDROCHLORIDE 200 MG/1
200 TABLET ORAL DAILY
Qty: 90 | Refills: 3 | Status: DISCONTINUED | COMMUNITY
Start: 2017-08-10 | End: 2018-04-11

## 2018-04-11 RX ORDER — PANTOPRAZOLE 40 MG/1
40 TABLET, DELAYED RELEASE ORAL DAILY
Qty: 90 | Refills: 0 | Status: DISCONTINUED | COMMUNITY
Start: 2017-09-05 | End: 2018-04-11

## 2018-04-11 RX ORDER — DOCUSATE SODIUM 100 MG/1
100 CAPSULE ORAL
Qty: 60 | Refills: 3 | Status: DISCONTINUED | COMMUNITY
Start: 2017-05-25 | End: 2018-04-11

## 2018-04-11 RX ORDER — WARFARIN 1 MG/1
1 TABLET ORAL DAILY
Qty: 90 | Refills: 3 | Status: DISCONTINUED | COMMUNITY
Start: 2018-01-11 | End: 2018-04-11

## 2018-04-11 RX ORDER — FERROUS SULFATE TAB EC 325 MG (65 MG FE EQUIVALENT) 325 (65 FE) MG
325 (65 FE) TABLET DELAYED RESPONSE ORAL DAILY
Qty: 90 | Refills: 0 | Status: DISCONTINUED | COMMUNITY
Start: 2017-09-07 | End: 2018-04-11

## 2018-04-11 RX ORDER — COLCHICINE 0.6 MG/1
0.6 TABLET ORAL DAILY
Qty: 90 | Refills: 3 | Status: DISCONTINUED | COMMUNITY
Start: 2017-12-08 | End: 2018-04-11

## 2018-04-11 RX ORDER — SPIRONOLACTONE 25 MG/1
25 TABLET ORAL DAILY
Qty: 90 | Refills: 3 | Status: DISCONTINUED | COMMUNITY
Start: 2017-10-26 | End: 2018-04-11

## 2018-04-12 ENCOUNTER — LABORATORY RESULT (OUTPATIENT)
Age: 68
End: 2018-04-12

## 2018-04-12 ENCOUNTER — OTHER (OUTPATIENT)
Age: 68
End: 2018-04-12

## 2018-04-15 NOTE — DIETITIAN INITIAL EVALUATION ADULT. - OTHER INFO
RASH
Pt seen for LOS. Per chart, Pt currently NPO s/p EGD and pending a capsule study to evaluate GI bleed. Pt reports a good PO intake while in rehab and states his weight was monitored every days and was slowly trending up. Pt states a staff memer held his batteries while being weighed daily; ? Wt gain related to improved PO Intake, vs fluids shifts. Will monitor Wt. Pt able to use CHF each back points regarding low Na diet, as well as daily Wt's and Wt gain parameters to contact MD. Pt able to use general teach back points regarding coumadin/vitamin K. Pt was amenable to both CHF and Coumadin education review. Pt requesting ensure Enlive x1 daily when diet advances. Pt taking vit C and folic acid PTA. Pt denies GI distress. Pt denies chewing/swallowing difficulty. NKFA

## 2018-04-16 ENCOUNTER — LABORATORY RESULT (OUTPATIENT)
Age: 68
End: 2018-04-16

## 2018-04-16 LAB
ALBUMIN SERPL ELPH-MCNC: 3.7 G/DL
ALP BLD-CCNC: 98 U/L
ALT SERPL-CCNC: 61 U/L
ANION GAP SERPL CALC-SCNC: 11 MMOL/L
AST SERPL-CCNC: 35 U/L
BASOPHILS # BLD AUTO: 0 K/UL
BASOPHILS NFR BLD AUTO: 0 %
BILIRUB SERPL-MCNC: 0.5 MG/DL
BUN SERPL-MCNC: 29 MG/DL
CALCIUM SERPL-MCNC: 9.3 MG/DL
CHLORIDE SERPL-SCNC: 111 MMOL/L
CO2 SERPL-SCNC: 20 MMOL/L
CREAT SERPL-MCNC: 1.2 MG/DL
EOSINOPHIL # BLD AUTO: 0 K/UL
EOSINOPHIL NFR BLD AUTO: 0 %
GLUCOSE SERPL-MCNC: 123 MG/DL
HCT VFR BLD CALC: 33.3 %
HGB BLD-MCNC: 9.9 G/DL
LYMPHOCYTES # BLD AUTO: 1.07 K/UL
LYMPHOCYTES NFR BLD AUTO: 9 %
MAGNESIUM SERPL-MCNC: 2.3 MG/DL
MAN DIFF?: NORMAL
MCHC RBC-ENTMCNC: 28.1 PG
MCHC RBC-ENTMCNC: 29.7 GM/DL
MCV RBC AUTO: 94.6 FL
MONOCYTES # BLD AUTO: 1.07 K/UL
MONOCYTES NFR BLD AUTO: 9 %
NEUTROPHILS # BLD AUTO: 9.36 K/UL
NEUTROPHILS NFR BLD AUTO: 77 %
PLATELET # BLD AUTO: 401 K/UL
POTASSIUM SERPL-SCNC: 5.5 MMOL/L
PROT SERPL-MCNC: 6.7 G/DL
RBC # BLD: 3.52 M/UL
RBC # FLD: 21.5 %
SODIUM SERPL-SCNC: 142 MMOL/L
TACROLIMUS SERPL-MCNC: 10.1 NG/ML
WBC # FLD AUTO: 11.85 K/UL

## 2018-04-17 LAB
ALBUMIN SERPL ELPH-MCNC: 3.6 G/DL
ALP BLD-CCNC: 97 U/L
ALT SERPL-CCNC: 64 U/L
ANION GAP SERPL CALC-SCNC: 10 MMOL/L
AST SERPL-CCNC: 36 U/L
BASOPHILS # BLD AUTO: 0 K/UL
BASOPHILS NFR BLD AUTO: 0 %
BILIRUB SERPL-MCNC: 0.6 MG/DL
BUN SERPL-MCNC: 28 MG/DL
CALCIUM SERPL-MCNC: 8.9 MG/DL
CHLORIDE SERPL-SCNC: 107 MMOL/L
CO2 SERPL-SCNC: 26 MMOL/L
CREAT SERPL-MCNC: 1.2 MG/DL
EOSINOPHIL # BLD AUTO: 0.01 K/UL
EOSINOPHIL NFR BLD AUTO: 0.1 %
GLUCOSE SERPL-MCNC: 102 MG/DL
HCT VFR BLD CALC: 27.8 %
HGB BLD-MCNC: 8.6 G/DL
IMM GRANULOCYTES NFR BLD AUTO: 0.5 %
LYMPHOCYTES # BLD AUTO: 0.88 K/UL
LYMPHOCYTES NFR BLD AUTO: 6.7 %
MAGNESIUM SERPL-MCNC: 2.1 MG/DL
MAN DIFF?: NORMAL
MCHC RBC-ENTMCNC: 27.7 PG
MCHC RBC-ENTMCNC: 30.9 GM/DL
MCV RBC AUTO: 89.4 FL
MONOCYTES # BLD AUTO: 0.7 K/UL
MONOCYTES NFR BLD AUTO: 5.3 %
NEUTROPHILS # BLD AUTO: 11.44 K/UL
NEUTROPHILS NFR BLD AUTO: 87.4 %
PLATELET # BLD AUTO: 322 K/UL
POTASSIUM SERPL-SCNC: 5.3 MMOL/L
PROT SERPL-MCNC: 6.6 G/DL
RBC # BLD: 3.11 M/UL
RBC # FLD: 20.3 %
SODIUM SERPL-SCNC: 143 MMOL/L
TACROLIMUS SERPL-MCNC: 8.7 NG/ML
WBC # FLD AUTO: 13.09 K/UL

## 2018-04-18 ENCOUNTER — OTHER (OUTPATIENT)
Age: 68
End: 2018-04-18

## 2018-04-19 ENCOUNTER — APPOINTMENT (OUTPATIENT)
Dept: CARDIOLOGY | Facility: CLINIC | Age: 68
End: 2018-04-19
Payer: MEDICARE

## 2018-04-19 ENCOUNTER — APPOINTMENT (OUTPATIENT)
Dept: CV DIAGNOSITCS | Facility: HOSPITAL | Age: 68
End: 2018-04-19

## 2018-04-19 ENCOUNTER — INPATIENT (INPATIENT)
Facility: HOSPITAL | Age: 68
LOS: 6 days | Discharge: HOME | DRG: 286 | End: 2018-04-26
Attending: THORACIC SURGERY (CARDIOTHORACIC VASCULAR SURGERY) | Admitting: INTERNAL MEDICINE
Payer: MEDICARE

## 2018-04-19 ENCOUNTER — RESULT REVIEW (OUTPATIENT)
Age: 68
End: 2018-04-19

## 2018-04-19 ENCOUNTER — OTHER (OUTPATIENT)
Age: 68
End: 2018-04-19

## 2018-04-19 VITALS
SYSTOLIC BLOOD PRESSURE: 157 MMHG | DIASTOLIC BLOOD PRESSURE: 110 MMHG | RESPIRATION RATE: 16 BRPM | TEMPERATURE: 98 F | HEIGHT: 68 IN | HEART RATE: 114 BPM | OXYGEN SATURATION: 97 % | WEIGHT: 169.09 LBS

## 2018-04-19 VITALS
TEMPERATURE: 97.5 F | WEIGHT: 159 LBS | RESPIRATION RATE: 16 BRPM | HEART RATE: 114 BPM | BODY MASS INDEX: 24.18 KG/M2 | SYSTOLIC BLOOD PRESSURE: 157 MMHG | OXYGEN SATURATION: 97 % | DIASTOLIC BLOOD PRESSURE: 110 MMHG

## 2018-04-19 VITALS — DIASTOLIC BLOOD PRESSURE: 88 MMHG | SYSTOLIC BLOOD PRESSURE: 138 MMHG

## 2018-04-19 DIAGNOSIS — N17.9 ACUTE KIDNEY FAILURE, UNSPECIFIED: ICD-10-CM

## 2018-04-19 DIAGNOSIS — S61.209D UNSPECIFIED OPEN WOUND OF UNSPECIFIED FINGER WITHOUT DAMAGE TO NAIL, SUBSEQUENT ENCOUNTER: ICD-10-CM

## 2018-04-19 DIAGNOSIS — Z95.811 PRESENCE OF HEART ASSIST DEVICE: Chronic | ICD-10-CM

## 2018-04-19 DIAGNOSIS — Z87.19 PERSONAL HISTORY OF OTHER DISEASES OF THE DIGESTIVE SYSTEM: ICD-10-CM

## 2018-04-19 DIAGNOSIS — I10 ESSENTIAL (PRIMARY) HYPERTENSION: ICD-10-CM

## 2018-04-19 DIAGNOSIS — B19.20 UNSPECIFIED VIRAL HEPATITIS C WITHOUT HEPATIC COMA: ICD-10-CM

## 2018-04-19 DIAGNOSIS — E87.5 HYPERKALEMIA: ICD-10-CM

## 2018-04-19 DIAGNOSIS — Z94.1 HEART TRANSPLANT STATUS: Chronic | ICD-10-CM

## 2018-04-19 DIAGNOSIS — I82.409 ACUTE EMBOLISM AND THROMBOSIS OF UNSPECIFIED DEEP VEINS OF UNSPECIFIED LOWER EXTREMITY: ICD-10-CM

## 2018-04-19 DIAGNOSIS — I50.9 HEART FAILURE, UNSPECIFIED: ICD-10-CM

## 2018-04-19 DIAGNOSIS — Z94.1 HEART TRANSPLANT STATUS: ICD-10-CM

## 2018-04-19 DIAGNOSIS — I82.629 ACUTE EMBOLISM AND THROMBOSIS OF DEEP VEINS OF UNSPECIFIED UPPER EXTREMITY: ICD-10-CM

## 2018-04-19 DIAGNOSIS — Z98.890 OTHER SPECIFIED POSTPROCEDURAL STATES: ICD-10-CM

## 2018-04-19 DIAGNOSIS — Z29.9 ENCOUNTER FOR PROPHYLACTIC MEASURES, UNSPECIFIED: ICD-10-CM

## 2018-04-19 DIAGNOSIS — D89.9 DISORDER INVOLVING THE IMMUNE MECHANISM, UNSPECIFIED: ICD-10-CM

## 2018-04-19 LAB
ALBUMIN SERPL ELPH-MCNC: 4.2 G/DL — SIGNIFICANT CHANGE UP (ref 3.3–5)
ALP SERPL-CCNC: 93 U/L — SIGNIFICANT CHANGE UP (ref 40–120)
ALT FLD-CCNC: 63 U/L — HIGH (ref 10–45)
ANION GAP SERPL CALC-SCNC: 11 MMOL/L — SIGNIFICANT CHANGE UP (ref 5–17)
ANION GAP SERPL CALC-SCNC: 13 MMOL/L — SIGNIFICANT CHANGE UP (ref 5–17)
APTT BLD: 28.8 SEC — SIGNIFICANT CHANGE UP (ref 27.5–37.4)
AST SERPL-CCNC: 34 U/L — SIGNIFICANT CHANGE UP (ref 10–40)
BASOPHILS # BLD AUTO: 0 K/UL — SIGNIFICANT CHANGE UP (ref 0–0.2)
BILIRUB SERPL-MCNC: 0.5 MG/DL — SIGNIFICANT CHANGE UP (ref 0.2–1.2)
BUN SERPL-MCNC: 30 MG/DL — HIGH (ref 7–23)
BUN SERPL-MCNC: 32 MG/DL — HIGH (ref 7–23)
CALCIUM SERPL-MCNC: 9.1 MG/DL — SIGNIFICANT CHANGE UP (ref 8.4–10.5)
CALCIUM SERPL-MCNC: 9.5 MG/DL — SIGNIFICANT CHANGE UP (ref 8.4–10.5)
CHLORIDE SERPL-SCNC: 105 MMOL/L — SIGNIFICANT CHANGE UP (ref 96–108)
CHLORIDE SERPL-SCNC: 106 MMOL/L — SIGNIFICANT CHANGE UP (ref 96–108)
CO2 SERPL-SCNC: 22 MMOL/L — SIGNIFICANT CHANGE UP (ref 22–31)
CO2 SERPL-SCNC: 24 MMOL/L — SIGNIFICANT CHANGE UP (ref 22–31)
CREAT SERPL-MCNC: 1.35 MG/DL — HIGH (ref 0.5–1.3)
CREAT SERPL-MCNC: 1.39 MG/DL — HIGH (ref 0.5–1.3)
EOSINOPHIL # BLD AUTO: 0.1 K/UL — SIGNIFICANT CHANGE UP (ref 0–0.5)
GLUCOSE SERPL-MCNC: 105 MG/DL — HIGH (ref 70–99)
GLUCOSE SERPL-MCNC: 140 MG/DL — HIGH (ref 70–99)
HCT VFR BLD CALC: 34.8 % — LOW (ref 39–50)
HGB BLD-MCNC: 10.4 G/DL — LOW (ref 13–17)
INR BLD: 1.05 RATIO — SIGNIFICANT CHANGE UP (ref 0.88–1.16)
LYMPHOCYTES # BLD AUTO: 1.4 K/UL — SIGNIFICANT CHANGE UP (ref 1–3.3)
LYMPHOCYTES # BLD AUTO: 11 % — LOW (ref 13–44)
MAGNESIUM SERPL-MCNC: 2.4 MG/DL — SIGNIFICANT CHANGE UP (ref 1.6–2.6)
MCHC RBC-ENTMCNC: 28.8 PG — SIGNIFICANT CHANGE UP (ref 27–34)
MCHC RBC-ENTMCNC: 29.9 GM/DL — LOW (ref 32–36)
MCV RBC AUTO: 96.2 FL — SIGNIFICANT CHANGE UP (ref 80–100)
METAMYELOCYTES # FLD: 1 % — HIGH (ref 0–0)
MONOCYTES # BLD AUTO: 1.1 K/UL — HIGH (ref 0–0.9)
MONOCYTES NFR BLD AUTO: 5 % — SIGNIFICANT CHANGE UP (ref 2–14)
MYELOCYTES NFR BLD: 1 % — HIGH (ref 0–0)
NEUTROPHILS # BLD AUTO: 10.3 K/UL — HIGH (ref 1.8–7.4)
NEUTROPHILS NFR BLD AUTO: 80 % — HIGH (ref 43–77)
NEUTS BAND # BLD: 2 % — SIGNIFICANT CHANGE UP (ref 0–8)
PLAT MORPH BLD: NORMAL — SIGNIFICANT CHANGE UP
PLATELET # BLD AUTO: 406 K/UL — HIGH (ref 150–400)
POLYCHROMASIA BLD QL SMEAR: SLIGHT — SIGNIFICANT CHANGE UP
POTASSIUM SERPL-MCNC: 5.7 MMOL/L — HIGH (ref 3.5–5.3)
POTASSIUM SERPL-MCNC: 6 MMOL/L — HIGH (ref 3.5–5.3)
POTASSIUM SERPL-SCNC: 5.7 MMOL/L — HIGH (ref 3.5–5.3)
POTASSIUM SERPL-SCNC: 6 MMOL/L — HIGH (ref 3.5–5.3)
PROT SERPL-MCNC: 7.1 G/DL — SIGNIFICANT CHANGE UP (ref 6–8.3)
PROTHROM AB SERPL-ACNC: 11.4 SEC — SIGNIFICANT CHANGE UP (ref 9.8–12.7)
RBC # BLD: 3.62 M/UL — LOW (ref 4.2–5.8)
RBC # FLD: 20.7 % — HIGH (ref 10.3–14.5)
RBC BLD AUTO: ABNORMAL
SCHISTOCYTES BLD QL AUTO: SLIGHT — SIGNIFICANT CHANGE UP
SODIUM SERPL-SCNC: 140 MMOL/L — SIGNIFICANT CHANGE UP (ref 135–145)
SODIUM SERPL-SCNC: 141 MMOL/L — SIGNIFICANT CHANGE UP (ref 135–145)
TACROLIMUS SERPL-MCNC: 10.3 NG/ML — SIGNIFICANT CHANGE UP
WBC # BLD: 12.9 K/UL — HIGH (ref 3.8–10.5)
WBC # FLD AUTO: 12.9 K/UL — HIGH (ref 3.8–10.5)

## 2018-04-19 PROCEDURE — 76937 US GUIDE VASCULAR ACCESS: CPT | Mod: 26

## 2018-04-19 PROCEDURE — 93010 ELECTROCARDIOGRAM REPORT: CPT

## 2018-04-19 PROCEDURE — 88341 IMHCHEM/IMCYTCHM EA ADD ANTB: CPT | Mod: 26

## 2018-04-19 PROCEDURE — 88307 TISSUE EXAM BY PATHOLOGIST: CPT | Mod: 26

## 2018-04-19 PROCEDURE — 88346 IMFLUOR 1ST 1ANTB STAIN PX: CPT | Mod: 26

## 2018-04-19 PROCEDURE — 88342 IMHCHEM/IMCYTCHM 1ST ANTB: CPT | Mod: 26

## 2018-04-19 PROCEDURE — 93451 RIGHT HEART CATH: CPT | Mod: 26,59

## 2018-04-19 PROCEDURE — 93505 ENDOMYOCARDIAL BIOPSY: CPT | Mod: 26

## 2018-04-19 PROCEDURE — 88350 IMFLUOR EA ADDL 1ANTB STN PX: CPT | Mod: 26

## 2018-04-19 PROCEDURE — 99152 MOD SED SAME PHYS/QHP 5/>YRS: CPT

## 2018-04-19 PROCEDURE — 88313 SPECIAL STAINS GROUP 2: CPT | Mod: 26

## 2018-04-19 PROCEDURE — 99215 OFFICE O/P EST HI 40 MIN: CPT | Mod: PD

## 2018-04-19 RX ORDER — VALGANCICLOVIR 450 MG/1
450 TABLET, FILM COATED ORAL
Qty: 0 | Refills: 0 | Status: DISCONTINUED | OUTPATIENT
Start: 2018-04-19 | End: 2018-04-19

## 2018-04-19 RX ORDER — ENOXAPARIN SODIUM 100 MG/ML
80 INJECTION SUBCUTANEOUS EVERY 12 HOURS
Qty: 0 | Refills: 0 | Status: DISCONTINUED | OUTPATIENT
Start: 2018-04-19 | End: 2018-04-20

## 2018-04-19 RX ORDER — ENOXAPARIN SODIUM 100 MG/ML
80 INJECTION SUBCUTANEOUS
Qty: 0 | Refills: 0 | Status: DISCONTINUED | OUTPATIENT
Start: 2018-04-19 | End: 2018-04-19

## 2018-04-19 RX ORDER — MAGNESIUM OXIDE 400 MG ORAL TABLET 241.3 MG
2 TABLET ORAL
Qty: 60 | Refills: 1 | OUTPATIENT
Start: 2018-04-19 | End: 2018-06-17

## 2018-04-19 RX ORDER — ASPIRIN/CALCIUM CARB/MAGNESIUM 324 MG
81 TABLET ORAL DAILY
Qty: 0 | Refills: 0 | Status: DISCONTINUED | OUTPATIENT
Start: 2018-04-19 | End: 2018-04-19

## 2018-04-19 RX ORDER — ATOVAQUONE 750 MG/5ML
1500 SUSPENSION ORAL DAILY
Qty: 0 | Refills: 0 | Status: DISCONTINUED | OUTPATIENT
Start: 2018-04-19 | End: 2018-04-26

## 2018-04-19 RX ORDER — SODIUM POLYSTYRENE SULFONATE 4.1 MEQ/G
15 POWDER, FOR SUSPENSION ORAL ONCE
Qty: 0 | Refills: 0 | Status: COMPLETED | OUTPATIENT
Start: 2018-04-19 | End: 2018-04-19

## 2018-04-19 RX ORDER — AMLODIPINE BESYLATE 2.5 MG/1
1 TABLET ORAL
Qty: 30 | Refills: 0 | OUTPATIENT
Start: 2018-04-19 | End: 2018-05-18

## 2018-04-19 RX ORDER — FUROSEMIDE 40 MG
1 TABLET ORAL
Qty: 30 | Refills: 0 | OUTPATIENT
Start: 2018-04-19 | End: 2018-05-18

## 2018-04-19 RX ORDER — SODIUM BICARBONATE 1 MEQ/ML
650 SYRINGE (ML) INTRAVENOUS THREE TIMES A DAY
Qty: 0 | Refills: 0 | Status: DISCONTINUED | OUTPATIENT
Start: 2018-04-19 | End: 2018-04-26

## 2018-04-19 RX ORDER — ASPIRIN/CALCIUM CARB/MAGNESIUM 324 MG
81 TABLET ORAL DAILY
Qty: 0 | Refills: 0 | Status: DISCONTINUED | OUTPATIENT
Start: 2018-04-19 | End: 2018-04-26

## 2018-04-19 RX ORDER — FUROSEMIDE 40 MG
20 TABLET ORAL DAILY
Qty: 0 | Refills: 0 | Status: DISCONTINUED | OUTPATIENT
Start: 2018-04-19 | End: 2018-04-22

## 2018-04-19 RX ORDER — SODIUM BICARBONATE 1 MEQ/ML
650 SYRINGE (ML) INTRAVENOUS THREE TIMES A DAY
Qty: 0 | Refills: 0 | Status: DISCONTINUED | OUTPATIENT
Start: 2018-04-19 | End: 2018-04-19

## 2018-04-19 RX ORDER — MYCOPHENOLATE MOFETIL 250 MG/1
1000 CAPSULE ORAL
Qty: 0 | Refills: 0 | Status: DISCONTINUED | OUTPATIENT
Start: 2018-04-19 | End: 2018-04-20

## 2018-04-19 RX ORDER — VALGANCICLOVIR 450 MG/1
1 TABLET, FILM COATED ORAL
Qty: 60 | Refills: 4 | OUTPATIENT
Start: 2018-04-19 | End: 2018-09-15

## 2018-04-19 RX ORDER — FUROSEMIDE 40 MG
20 TABLET ORAL ONCE
Qty: 0 | Refills: 0 | Status: COMPLETED | OUTPATIENT
Start: 2018-04-19 | End: 2018-04-19

## 2018-04-19 RX ORDER — DOCUSATE SODIUM 100 MG
100 CAPSULE ORAL THREE TIMES A DAY
Qty: 0 | Refills: 0 | Status: DISCONTINUED | OUTPATIENT
Start: 2018-04-19 | End: 2018-04-26

## 2018-04-19 RX ORDER — MAGNESIUM OXIDE 400 MG ORAL TABLET 241.3 MG
400 TABLET ORAL
Qty: 0 | Refills: 0 | Status: DISCONTINUED | OUTPATIENT
Start: 2018-04-19 | End: 2018-04-26

## 2018-04-19 RX ORDER — NYSTATIN 500MM UNIT
500000 POWDER (EA) MISCELLANEOUS EVERY 6 HOURS
Qty: 0 | Refills: 0 | Status: DISCONTINUED | OUTPATIENT
Start: 2018-04-19 | End: 2018-04-26

## 2018-04-19 RX ORDER — TACROLIMUS 5 MG/1
10 CAPSULE ORAL
Qty: 0 | Refills: 0 | Status: DISCONTINUED | OUTPATIENT
Start: 2018-04-19 | End: 2018-04-20

## 2018-04-19 RX ORDER — FAMOTIDINE 10 MG/ML
20 INJECTION INTRAVENOUS DAILY
Qty: 0 | Refills: 0 | Status: DISCONTINUED | OUTPATIENT
Start: 2018-04-19 | End: 2018-04-19

## 2018-04-19 RX ORDER — VALGANCICLOVIR 450 MG/1
450 TABLET, FILM COATED ORAL
Qty: 0 | Refills: 0 | Status: DISCONTINUED | OUTPATIENT
Start: 2018-04-19 | End: 2018-04-20

## 2018-04-19 RX ORDER — MAGNESIUM OXIDE 400 MG ORAL TABLET 241.3 MG
800 TABLET ORAL DAILY
Qty: 0 | Refills: 0 | Status: DISCONTINUED | OUTPATIENT
Start: 2018-04-19 | End: 2018-04-19

## 2018-04-19 RX ORDER — DILTIAZEM HCL 120 MG
240 CAPSULE, EXT RELEASE 24 HR ORAL DAILY
Qty: 0 | Refills: 0 | Status: DISCONTINUED | OUTPATIENT
Start: 2018-04-19 | End: 2018-04-26

## 2018-04-19 RX ORDER — AMLODIPINE BESYLATE 2.5 MG/1
5 TABLET ORAL DAILY
Qty: 0 | Refills: 0 | Status: DISCONTINUED | OUTPATIENT
Start: 2018-04-19 | End: 2018-04-26

## 2018-04-19 RX ORDER — DILTIAZEM HCL 120 MG
240 CAPSULE, EXT RELEASE 24 HR ORAL DAILY
Qty: 0 | Refills: 0 | Status: DISCONTINUED | OUTPATIENT
Start: 2018-04-19 | End: 2018-04-19

## 2018-04-19 RX ORDER — FAMOTIDINE 10 MG/ML
20 INJECTION INTRAVENOUS
Qty: 0 | Refills: 0 | Status: DISCONTINUED | OUTPATIENT
Start: 2018-04-19 | End: 2018-04-26

## 2018-04-19 RX ADMIN — Medication 20 MILLIGRAM(S): at 22:11

## 2018-04-19 RX ADMIN — Medication 15 MILLIGRAM(S): at 22:11

## 2018-04-19 RX ADMIN — VALGANCICLOVIR 450 MILLIGRAM(S): 450 TABLET, FILM COATED ORAL at 22:14

## 2018-04-19 RX ADMIN — ENOXAPARIN SODIUM 80 MILLIGRAM(S): 100 INJECTION SUBCUTANEOUS at 22:31

## 2018-04-19 RX ADMIN — TACROLIMUS 10 MILLIGRAM(S): 5 CAPSULE ORAL at 21:41

## 2018-04-19 RX ADMIN — Medication 650 MILLIGRAM(S): at 22:11

## 2018-04-19 RX ADMIN — MYCOPHENOLATE MOFETIL 1000 MILLIGRAM(S): 250 CAPSULE ORAL at 22:12

## 2018-04-19 RX ADMIN — MAGNESIUM OXIDE 400 MG ORAL TABLET 400 MILLIGRAM(S): 241.3 TABLET ORAL at 22:14

## 2018-04-19 RX ADMIN — SODIUM POLYSTYRENE SULFONATE 15 GRAM(S): 4.1 POWDER, FOR SUSPENSION ORAL at 16:24

## 2018-04-19 NOTE — H&P ADULT - HISTORY OF PRESENT ILLNESS
67M with ACC/AHA stage D chronic systolic heart failure due to NICM s/p HM2 LVAD 6/17 c/b pump thrombosis now s/p heart transplant on 2/23 (CMV D-/R+ and Toxo D-/R+), with post-op course c/b primary graft dysfunction, initially thought to be immune-mediated due to positive B-cell flow (negative CDC cross match) and received plasmapharesis/IVIg x2 with improvement in LV function. Post-transplant course has been complicated by bilateral IJ/subclavian thrombi and he has a persistent small  right sided pleural effusion as well as chronic renal failure, thought to be related to Prograf.     His echocardiogram on 4/3 showed evidence of slight worsened LV strain, LVEF of 52%, and slightly diminished right ventricular systolic function. It was determined that he missed five doses of prednisone. RHC on 4/4 showed elevated biventricular filling pressures with normal cardiac output. Biopsy showed 1R/1A cellular rejection. C4D continues to be diffusely positive. DSA is unremarkable. 67M with ACC/AHA stage D chronic systolic heart failure due to NICM s/p HM2 LVAD 6/17 c/b pump thrombosis now s/p heart transplant on 2/23 (CMV D-/R+ and Toxo D-/R+), with post-op course c/b primary graft dysfunction, initially thought to be immune-mediated due to positive B-cell flow (negative CDC cross match) and received plasmapharesis/IVIg x2 with improvement in LV function. Post-transplant course has been complicated by bilateral IJ/subclavian thrombi and he has a persistent small  right sided pleural effusion as well as chronic renal failure, thought to be related to Prograf.  Echo on 4/3 showed evidence of slight worsened LV strain, LVEF of 52%, and slightly diminished right ventricular systolic function.  RHC on 4/4 showed elevated biventricular filling pressures with normal cardiac output. Biopsy showed 1R/1A cellular rejection. C4D continues to be diffusely positive. DSA is unremarkable.    He continued to make progress and was discharged to home on 4/ Returns today for RHC for cardiac biopsy and was found to have potassium of 6.0.  Received kaexylate in cath lab with +BM. 67M with ACC/AHA stage D chronic systolic heart failure due to NICM s/p HM2 LVAD 6/17 c/b pump thrombosis now s/p heart transplant on 2/23 (CMV D-/R+ and Toxo D-/R+), with post-op course c/b primary graft dysfunction, initially thought to be immune-mediated due to positive B-cell flow (negative CDC cross match) and received plasmapharesis/IVIg x2 with improvement in LV function. Post-transplant course has been complicated by bilateral IJ/subclavian thrombi and he has a persistent small  right sided pleural effusion as well as chronic renal failure, thought to be related to Prograf.  Echo on 4/3 showed evidence of slight worsened LV strain, LVEF of 52%, and slightly diminished right ventricular systolic function.  RHC on 4/4 showed elevated biventricular filling pressures with normal cardiac output. Biopsy showed 1R/1A cellular rejection. C4D continues to be diffusely positive. DSA is unremarkable.    He continued to make progress and was discharged to home on 4/10  Returns today for RHC for cardiac biopsy and was found to have potassium of 6.0.  Received kaexylate in cath lab with +BM.

## 2018-04-19 NOTE — H&P CARDIOLOGY - PMH
CHF (Congestive Heart Failure)    GIB (gastrointestinal bleeding)    HTN    Non-Ischemic Cardiomyopathy    PAF (paroxysmal atrial fibrillation)  on xarelto  SVT (Supraventricular Tachycardia)    Ventricular fibrillation  s/p AICD CHF (Congestive Heart Failure)    GIB (gastrointestinal bleeding)    Hepatitis C virus    HTN    Non-Ischemic Cardiomyopathy    PAF (paroxysmal atrial fibrillation)  on xarelto  SVT (Supraventricular Tachycardia)    Ventricular fibrillation  s/p AICD

## 2018-04-19 NOTE — H&P CARDIOLOGY - HISTORY OF PRESENT ILLNESS
67M PMH with s/p HEART TRANSPLANT IN 2/25/2018,GIB s/p Cautery (7/25/2017), known AV Malformations, Chronic Anemia due to numerous GIBs  presents today for cardiac cath with Biopsy ( 7th biopsy) following the heart transplant. Patient had 6 biopsy in the past.  Pt denies any chest pain, SOB, palpitations, N/D/V, abdominal pain, headaches, changes in vision, dizziness, pain or burning while urinating, swelling, numbness/tingling anywhere, rashes, fever, or recent travel. Wound vac present at the chest wound.

## 2018-04-19 NOTE — H&P ADULT - PSH
AICD (Automatic Cardioverter/Defibrillator) Present  [employer] with 1 [employer] lead4/1/09- explanted and replaced with [employer] 2 leads on 9/2/09  H/O heart transplant  2/2018  History of Prior Ablation Treatment  for afib  LVAD (left ventricular assist device) present    Status post left hip replacement

## 2018-04-19 NOTE — H&P ADULT - NSHPPHYSICALEXAM_GEN_ALL_CORE
General: WN/WD NAD  Neurology: A&Ox3, nonfocal, RUVALCABA x 4  Head:  Normocephalic, atraumatic  ENT:  Mucosa moist, no ulcerations  Neck:  Supple, no sinuses or palpable masses  Lymphatic:  No palpable cervical, supraclavicular, axillary or inguinal adenopathy  Respiratory: CTA B/L  CV: RRR, S1S2, no murmur  Abdominal: Soft, NT, ND no palpable mass  MSK: No edema, + peripheral pulses, FROM all 4 extremity  Incisions: R groin cath site with band-aid, no hematoma  Former LVAD site: Vac intact General: WN/WD NAD  Neurology: A&Ox3, nonfocal, RUVALCABA x 4  Head:  Normocephalic, atraumatic  ENT:  Mucosa moist, no ulcerations  Neck:  Supple, no sinuses or palpable masses  Lymphatic:  No palpable cervical, supraclavicular, axillary or inguinal adenopathy  Respiratory: CTA B/L  CV: RRR, S1S2, no murmur  Abdominal: Soft, NT, ND no palpable mass  MSK: No edema, + peripheral pulses, FROM all 4 extremity  Incisions: R groin cath site with band-aid, no hematoma  Former LVAD site: Vac with black sponge intact to suction

## 2018-04-19 NOTE — H&P ADULT - PROBLEM SELECTOR PLAN 4
ID: CMV: Valcyte 450mg BID f/u CMV PCR  PCP/Toxo: mepron: 1500 mg daily  Thrush: Nystatin 500,000units QID  CAV ppx: ASA 81/pravachol 20mg  Osteoporosis: Citracal

## 2018-04-19 NOTE — H&P ADULT - NSHPREVIEWOFSYSTEMS_GEN_ALL_CORE
Review of Systems  GENERAL:  Fevers[] chills[] sweats[] fatigue[] weight loss[] weight gain []                                        NEURO: seizures []  syncope []  confusion []                                                                                  EYES: glasses[]  blurry vision[]  discharge[] pain[] glaucoma []                                                                            ENT:  difficulty hearing []  vertigo[]  dysphagia[] epistaxis[] recent dental work []                                      CV:  chest pain[] palpitations[] LOBATO [] diaphoresis [] edema[]                                                                                             RESPIRATORY:  wheezing[] SOB[] cough [] sputum[] hemoptysis[]                                                                    GI:  nausea[]  vomiting []  diarrhea[] constipation [] melena []                                                                        : hematuria[ ]  dysuria[ ] urgency[] incontinence[]                                                                                              MUSCULOSKELETAL:  arthritis[ ]  joint swelling [ ] muscle weakness [ ]                                                                  SKIN/BREAST:  rash[ ] itching [ ]  hair loss[ ] masses[ ]                                                                                                PSYCH:  dementia [ ] depression [ ] anxiety[ ]                                                                                                                  HEME/LYMPH:  bruises easily[ ] enlarged lymph nodes[ ] tender lymph nodes[ ]                                                 ENDOCRINE:  cold intolerance[ ] heat intolerance[ ] polydipsia[ ]

## 2018-04-19 NOTE — H&P ADULT - NSHPSOCIALHISTORY_GEN_ALL_CORE
SOCIAL HISTORY:  Smoker: [ ] Yes  [ ] No        PACK YEARS:          Quit date:  ETOH use: [ ] Yes  [ ] No              FREQUENCY / QUANTITY:  Ilicit Drug use:  [ ] Yes  [ ] No  Occupation:   Living arrangements:   Assist device use:     Relevant Family History  FAMILY HISTORY:  Family history of heart disease: SOCIAL HISTORY:  Smoker: [ ] Yes  [x ] No        PACK YEARS:          Quit date:  ETOH use: [ ] Yes  [ x] No              FREQUENCY / QUANTITY:  Ilicit Drug use:  [ ] Yes  [x ] No  Occupation: disabled  Living arrangements: with family   Assist device use: none

## 2018-04-19 NOTE — H&P ADULT - PROBLEM SELECTOR PLAN 1
K=6.0 on admission  Received one dose Kayexelate +BM  Repeat K+  Will administer IV lasix if K>5.5  Repeat Kayexelate if required  low potassium diet

## 2018-04-19 NOTE — H&P ADULT - ASSESSMENT
67M with ACC/AHA stage D chronic systolic heart failure due to NICM s/p HM2 LVAD 6/17 c/b pump thrombosis now s/p heart transplant on 2/23 (CMV D-/R+ and Toxo D-/R+), with post-op course c/b primary graft dysfunction, initially thought to be immune-mediated due to positive B-cell flow (negative CDC cross match) and received plasmapharesis/IVIg x2 with improvement in LV function. Post-transplant course has been complicated by bilateral IJ/subclavian thrombi and he has a persistent small  right sided pleural effusion as well as chronic renal failure, thought to be related to Prograf. Echocardiogram on 4/3 showed evidence of slight worsened LV strain, LVEF of 52%, and slightly diminished right ventricular systolic function.  RHC on 4/4 showed elevated biventricular filling pressures with normal cardiac output. Biopsy showed 1R/1A cellular rejection. C4D continues to be diffusely positive. DSA is unremarkable. 4/5 - Rounds made with Dr. Stahl:   tacro level - 22.4 - will hold prograf this evening - & decrease dose to 9mg po bid starting 4/6 IV lasix 40mg x 1;  - with increase tac level - will decrease Cardizem CD to 240mg po daily  4/6 prograf level: 14.4 this am- d/w Dr. Stahl- prograf 5 mg given this am then 12 mg this evening; prograf 9 bid to start in am sat 4/7; VSS;  rt pleural eff- sono site; minimal fluid as per CTU Pa/NP; driveline vac changed today   4/7 GFR improved. Heparin BID changed to Lovenox bid. Magnesium oxide decreased to  once daily. Valcyte 450mg increased QD  4/8 SR  - possible d/c home Monday as per transplant team.  4/9 hyperkalemia requiring kayexalate, will need to repeat.  Increase ambulation.  D/c planning    Returns today after RHC after biopsy for further management. Possible discharge in AM

## 2018-04-19 NOTE — H&P ADULT - PROBLEM SELECTOR PLAN 2
Patient takes medications at home at [id number]  discussed with Dr. Eliot Raymundo level at 0815  Tacrolimus [id number]/Cardizem 240mg daily  Prednisone 15mg q 12 may decrease depending on bx  Cellcept 1000mg Q12

## 2018-04-19 NOTE — H&P CARDIOLOGY - PSH
AICD (Automatic Cardioverter/Defibrillator) Present  [employer] with 1 [employer] lead4/1/09- explanted and replaced with [employer] 2 leads on 9/2/09  H/O heart transplant  2/2018  History of Prior Ablation Treatment  for afib  LVAD (left ventricular assist device) present    Status post left hip replacement

## 2018-04-19 NOTE — H&P ADULT - PMH
CHF (Congestive Heart Failure)    GIB (gastrointestinal bleeding)    Hepatitis C virus    HTN    Non-Ischemic Cardiomyopathy    PAF (paroxysmal atrial fibrillation)  on xarelto  SVT (Supraventricular Tachycardia)    Ventricular fibrillation  s/p AICD

## 2018-04-19 NOTE — H&P ADULT - PROBLEM SELECTOR PLAN 5
lovenox BID  As per transplant note: restart tonight will administer at 2230 and change timing to be administered at [id number]

## 2018-04-20 ENCOUNTER — OTHER (OUTPATIENT)
Age: 68
End: 2018-04-20

## 2018-04-20 PROBLEM — Z87.19 HISTORY OF GI BLEED: Status: RESOLVED | Noted: 2017-09-05 | Resolved: 2018-04-20

## 2018-04-20 LAB
ANION GAP SERPL CALC-SCNC: 12 MMOL/L — SIGNIFICANT CHANGE UP (ref 5–17)
ANION GAP SERPL CALC-SCNC: 13 MMOL/L — SIGNIFICANT CHANGE UP (ref 5–17)
BUN SERPL-MCNC: 29 MG/DL — HIGH (ref 7–23)
BUN SERPL-MCNC: 30 MG/DL — HIGH (ref 7–23)
CALCIUM SERPL-MCNC: 8.8 MG/DL — SIGNIFICANT CHANGE UP (ref 8.4–10.5)
CALCIUM SERPL-MCNC: 8.9 MG/DL — SIGNIFICANT CHANGE UP (ref 8.4–10.5)
CHLORIDE SERPL-SCNC: 101 MMOL/L — SIGNIFICANT CHANGE UP (ref 96–108)
CHLORIDE SERPL-SCNC: 104 MMOL/L — SIGNIFICANT CHANGE UP (ref 96–108)
CMV DNA CSF QL NAA+PROBE: SIGNIFICANT CHANGE UP
CO2 SERPL-SCNC: 24 MMOL/L — SIGNIFICANT CHANGE UP (ref 22–31)
CO2 SERPL-SCNC: 25 MMOL/L — SIGNIFICANT CHANGE UP (ref 22–31)
CREAT SERPL-MCNC: 1.31 MG/DL — HIGH (ref 0.5–1.3)
CREAT SERPL-MCNC: 1.33 MG/DL — HIGH (ref 0.5–1.3)
GLUCOSE SERPL-MCNC: 150 MG/DL — HIGH (ref 70–99)
GLUCOSE SERPL-MCNC: 153 MG/DL — HIGH (ref 70–99)
HCT VFR BLD CALC: 32.8 % — LOW (ref 39–50)
HCV RNA SERPL NAA DL=5-ACNC: HIGH IU/ML
HCV RNA SPEC NAA+PROBE-LOG IU: 4.63 LOGIU/ML — HIGH
HGB BLD-MCNC: 10.1 G/DL — LOW (ref 13–17)
MCHC RBC-ENTMCNC: 29.3 PG — SIGNIFICANT CHANGE UP (ref 27–34)
MCHC RBC-ENTMCNC: 30.8 GM/DL — LOW (ref 32–36)
MCV RBC AUTO: 95.4 FL — SIGNIFICANT CHANGE UP (ref 80–100)
PLATELET # BLD AUTO: 314 K/UL — SIGNIFICANT CHANGE UP (ref 150–400)
POTASSIUM SERPL-MCNC: 4.9 MMOL/L — SIGNIFICANT CHANGE UP (ref 3.5–5.3)
POTASSIUM SERPL-MCNC: 5.7 MMOL/L — HIGH (ref 3.5–5.3)
POTASSIUM SERPL-SCNC: 4.9 MMOL/L — SIGNIFICANT CHANGE UP (ref 3.5–5.3)
POTASSIUM SERPL-SCNC: 5.7 MMOL/L — HIGH (ref 3.5–5.3)
RBC # BLD: 3.43 M/UL — LOW (ref 4.2–5.8)
RBC # FLD: 20.5 % — HIGH (ref 10.3–14.5)
SODIUM SERPL-SCNC: 139 MMOL/L — SIGNIFICANT CHANGE UP (ref 135–145)
SODIUM SERPL-SCNC: 140 MMOL/L — SIGNIFICANT CHANGE UP (ref 135–145)
TACROLIMUS SERPL-MCNC: 13.3 NG/ML — SIGNIFICANT CHANGE UP
WBC # BLD: 9.7 K/UL — SIGNIFICANT CHANGE UP (ref 3.8–10.5)
WBC # FLD AUTO: 9.7 K/UL — SIGNIFICANT CHANGE UP (ref 3.8–10.5)

## 2018-04-20 PROCEDURE — 71045 X-RAY EXAM CHEST 1 VIEW: CPT | Mod: 26,77

## 2018-04-20 PROCEDURE — 99223 1ST HOSP IP/OBS HIGH 75: CPT

## 2018-04-20 PROCEDURE — 99232 SBSQ HOSP IP/OBS MODERATE 35: CPT | Mod: 25

## 2018-04-20 PROCEDURE — 71045 X-RAY EXAM CHEST 1 VIEW: CPT | Mod: 26

## 2018-04-20 PROCEDURE — 93970 EXTREMITY STUDY: CPT | Mod: 26

## 2018-04-20 PROCEDURE — 36514 APHERESIS PLASMA: CPT

## 2018-04-20 RX ORDER — DIPHENHYDRAMINE HCL 50 MG
25 CAPSULE ORAL ONCE
Qty: 0 | Refills: 0 | Status: COMPLETED | OUTPATIENT
Start: 2018-04-20 | End: 2018-04-20

## 2018-04-20 RX ORDER — TACROLIMUS 5 MG/1
10 CAPSULE ORAL
Qty: 0 | Refills: 0 | Status: DISCONTINUED | OUTPATIENT
Start: 2018-04-20 | End: 2018-04-22

## 2018-04-20 RX ORDER — ACETAMINOPHEN 500 MG
650 TABLET ORAL ONCE
Qty: 0 | Refills: 0 | Status: COMPLETED | OUTPATIENT
Start: 2018-04-20 | End: 2018-04-20

## 2018-04-20 RX ORDER — ENOXAPARIN SODIUM 100 MG/ML
80 INJECTION SUBCUTANEOUS
Qty: 0 | Refills: 0 | Status: DISCONTINUED | OUTPATIENT
Start: 2018-04-20 | End: 2018-04-20

## 2018-04-20 RX ORDER — VALGANCICLOVIR 450 MG/1
450 TABLET, FILM COATED ORAL
Qty: 0 | Refills: 0 | Status: DISCONTINUED | OUTPATIENT
Start: 2018-04-20 | End: 2018-04-22

## 2018-04-20 RX ORDER — IMMUNE GLOBULIN,GAMMA(IGG) 5 %
10 VIAL (ML) INTRAVENOUS ONCE
Qty: 0 | Refills: 0 | Status: COMPLETED | OUTPATIENT
Start: 2018-04-20 | End: 2018-04-20

## 2018-04-20 RX ORDER — MYCOPHENOLATE MOFETIL 250 MG/1
1000 CAPSULE ORAL
Qty: 0 | Refills: 0 | Status: DISCONTINUED | OUTPATIENT
Start: 2018-04-20 | End: 2018-04-26

## 2018-04-20 RX ADMIN — Medication 650 MILLIGRAM(S): at 21:33

## 2018-04-20 RX ADMIN — Medication 650 MILLIGRAM(S): at 20:30

## 2018-04-20 RX ADMIN — Medication 650 MILLIGRAM(S): at 20:00

## 2018-04-20 RX ADMIN — ATOVAQUONE 1500 MILLIGRAM(S): 750 SUSPENSION ORAL at 12:56

## 2018-04-20 RX ADMIN — Medication 500000 UNIT(S): at 23:45

## 2018-04-20 RX ADMIN — FAMOTIDINE 20 MILLIGRAM(S): 10 INJECTION INTRAVENOUS at 06:22

## 2018-04-20 RX ADMIN — Medication 1 TABLET(S): at 12:53

## 2018-04-20 RX ADMIN — Medication 500000 UNIT(S): at 06:22

## 2018-04-20 RX ADMIN — Medication 100 GRAM(S): at 20:30

## 2018-04-20 RX ADMIN — TACROLIMUS 10 MILLIGRAM(S): 5 CAPSULE ORAL at 20:12

## 2018-04-20 RX ADMIN — Medication 500000 UNIT(S): at 12:53

## 2018-04-20 RX ADMIN — Medication 1 APPLICATION(S): at 06:22

## 2018-04-20 RX ADMIN — Medication 20 MILLIGRAM(S): at 21:33

## 2018-04-20 RX ADMIN — Medication 650 MILLIGRAM(S): at 15:50

## 2018-04-20 RX ADMIN — Medication 20 MILLIGRAM(S): at 00:21

## 2018-04-20 RX ADMIN — Medication 500000 UNIT(S): at 00:21

## 2018-04-20 RX ADMIN — Medication 25 MILLIGRAM(S): at 20:00

## 2018-04-20 RX ADMIN — Medication 81 MILLIGRAM(S): at 12:53

## 2018-04-20 RX ADMIN — Medication 20 MILLIGRAM(S): at 06:22

## 2018-04-20 RX ADMIN — MAGNESIUM OXIDE 400 MG ORAL TABLET 400 MILLIGRAM(S): 241.3 TABLET ORAL at 08:15

## 2018-04-20 RX ADMIN — MAGNESIUM OXIDE 400 MG ORAL TABLET 400 MILLIGRAM(S): 241.3 TABLET ORAL at 21:33

## 2018-04-20 RX ADMIN — Medication 650 MILLIGRAM(S): at 06:22

## 2018-04-20 RX ADMIN — Medication 15 MILLIGRAM(S): at 08:18

## 2018-04-20 RX ADMIN — Medication 1 APPLICATION(S): at 17:40

## 2018-04-20 RX ADMIN — MYCOPHENOLATE MOFETIL 1000 MILLIGRAM(S): 250 CAPSULE ORAL at 20:13

## 2018-04-20 RX ADMIN — ENOXAPARIN SODIUM 80 MILLIGRAM(S): 100 INJECTION SUBCUTANEOUS at 06:22

## 2018-04-20 RX ADMIN — VALGANCICLOVIR 450 MILLIGRAM(S): 450 TABLET, FILM COATED ORAL at 08:17

## 2018-04-20 RX ADMIN — TACROLIMUS 10 MILLIGRAM(S): 5 CAPSULE ORAL at 08:15

## 2018-04-20 RX ADMIN — Medication 500000 UNIT(S): at 17:40

## 2018-04-20 RX ADMIN — MYCOPHENOLATE MOFETIL 1000 MILLIGRAM(S): 250 CAPSULE ORAL at 08:17

## 2018-04-20 RX ADMIN — Medication 240 MILLIGRAM(S): at 07:09

## 2018-04-20 RX ADMIN — AMLODIPINE BESYLATE 5 MILLIGRAM(S): 2.5 TABLET ORAL at 06:22

## 2018-04-20 RX ADMIN — VALGANCICLOVIR 450 MILLIGRAM(S): 450 TABLET, FILM COATED ORAL at 20:12

## 2018-04-20 RX ADMIN — Medication 15 MILLIGRAM(S): at 20:14

## 2018-04-20 RX ADMIN — FAMOTIDINE 20 MILLIGRAM(S): 10 INJECTION INTRAVENOUS at 17:40

## 2018-04-20 NOTE — CHART NOTE - NSCHARTNOTEFT_GEN_A_CORE
Patient received 20mg IVP lasix for K of 5.7.   Urinated 200cc and had additional large BM after 15mg Kayexelate given in cath lab.   Will continue to monitor and repeat lasix at 0600.

## 2018-04-20 NOTE — PROCEDURE NOTE - NSPOSTPRCRAD_GEN_A_CORE
depth of insertion/line adjusted to depth of insertion/central line located in the/central line located in the superior vena cava/post procedure radiography not performed/no pneumothorax

## 2018-04-20 NOTE — CONSULT NOTE ADULT - ASSESSMENT
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 Found to have b/l IJ/subclavian thrombi as well at that time and was treated with ongoing AC.    # s/p OHT now with graft dysfunction - While no HLA DSA identified, this could be non-HLA. Given his history, we will proceed with plasmapheresis/IVIg every other day starting today and reassess graft function by echo on Monday. Would consider continuing IVIg without PP if graft function improves. Have collected a sample to send to the Immunology lab at [facility] to look for angiotensin type 1 receptor antibodies that have been associated with non-HLA graft impairment (treatment includes ARB).    - Biopsies  EMB #1: 1R/1A, AMR 1. No DSA.  EMB #2: 1R/1A, AMR 1. No DSA.  EMB #3: 1R/1A. stable AMR1. No DSA. RA 12, PA 40/16/27, PCW 11, PA 67%, CO/CI 6.8/3.6. TTE with mild RV enlargement and dysfunction.  EMB #4:   EMB #5: 0R, stable AMR1. No DSA.     - Immunosuppression prophylaxis:  Tacrolimus - continue . Target trough level 12-14.  CellCept - continue 1000 mg PO BID.  Steroids -   	  - Antimicrobial prophylaxis:  Intermediate risk CMV -   Intermediate risk Toxo - continue Mepron 1500mg PO daily with food.  PCP - continue Mepron 1500mg PO daily  Thrush - continue Nystatin S/S 5 mL QID    # Additional treatments:  - Citracal + D 2 tabs PO BID  - multivitamin 1 tab daily  - Protonix 40 mg po daily fror stress ulcer prophylaxis while on steriods    # Bilateral IJ/Subclavian thrombi -   - Lovenox 80mg subcutaneous Q12 x 3 months  - CHERIE negative    # CAV PPx  - Continue ECASA 81mg PO daily  - Continue pravastatin 20mg PO QHS    #NORMAN likely related to the Vancomycin dosing  - Vanco stopped. DLES looked very good yesterday and WBC trending down  - He responded to diuretics given on Friday. No further diuresis.  - Please check daily weights and track urine output (NO HOBSON)  - Cardio-Renal consult, please.    #Hypertension  - He is currently on cardizem and norvasc for his BP control. Given the possibility that this could be non-HLA rejection, it would be ideal to use losartan instead of norvasc for BP control if his potassium level is under better control.    # ID   - Pseudomonas in sputum - course of cefepime completed 3/15  - DLES improved and vancomycin stopped given prior NORMAN  - silver sulfadizaine dressings per plastics for R 3rd digit.   - HCV viral load and PCR per protocol. Therapy to be initiated as outpatient by Dr. 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 Found to have b/l IJ/subclavian thrombi as well at that time and was treated with ongoing AC. 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    # s/p OHT now with graft dysfunction - While no HLA DSA identified, this could be non-HLA. Given his history, we will proceed with plasmapheresis/IVIg every other day starting today and reassess graft function by echo on Monday. Would consider continuing IVIg without PP if graft function improves. Have collected a sample to send to the Immunology lab at [facility] to look for angiotensin type 1 receptor antibodies that have been associated with non-HLA graft impairment (treatment includes ARB).    # Immunosuppression prophylaxis:  Tacrolimus - Therapeutic at current dose. Target trough level 12-14.  CellCept - continue 1000 mg PO BID.  Steroids - would continue 15 mg po BID and not taper yet.  	  # Antimicrobial prophylaxis:  Intermediate risk CMV - continue Valcyte 450 mg po BID  Intermediate risk Toxo - continue Mepron 1500mg PO daily with food.  PCP - continue Mepron 1500mg PO daily  Thrush - continue Nystatin S/S 5 mL QID until prednisone < 10 mg po BID.    # Additional treatments:  - Citracal + D 2 tabs PO BID  - multivitamin 1 tab daily  - Protonix 40 mg po daily fror stress ulcer prophylaxis while on steriods    # Bilateral IJ/Subclavian thrombi -   - Will hold AC until he completes plasmapheresis as it depletes clotting factors. He was supposed to complete a 3 month course.  - CHERIE negative on last admission    # CAV PPx  - Continue ECASA 81mg PO daily  - Continue pravastatin 20mg PO QHS    # Hypertension  - He is currently on cardizem and norvasc for his BP control. Given the possibility that this could be non-HLA rejection, it would be ideal to use losartan instead of norvasc for BP control if his potassium level is under better control.    # ID   - DLES improved and vancomycin stopped given prior NORMAN  - silver sulfadizaine dressings per plastics for R 3rd digit.   - HCV viral load and PCR per protocol. Therapy to be initiated as outpatient by Dr. Thomas (Hepatology)  - Transplant ID following    # endo  - SSI AC & HS

## 2018-04-20 NOTE — PROGRESS NOTE ADULT - ASSESSMENT
68 y/o M s/p heart transplant about two months ago, who had graft dysfunction thought to represent antibody-mediated rejection (AMR) in the immediate aftermath. The patient was treated with therapeutic plasma exchange (TPE) + IVIg  x 2 at that time, responding with improved function. He now presents with recurring graft dysfunction, which again could represent AMR, even though no donor-specific Anti-HLA antibodies has been detected. Thus, the plan is to proceed with TPE/IVIg every other day. Thus, today we will perform TPE, one plasma volume, with FFP as replacement fluid.

## 2018-04-20 NOTE — CONSULT NOTE ADULT - SUBJECTIVE AND OBJECTIVE BOX
Interval History:    Medications:  amLODIPine   Tablet 5 milliGRAM(s) Oral daily  aspirin enteric coated 81 milliGRAM(s) Oral daily  atovaquone Suspension 1500 milliGRAM(s) Oral daily  calcium citrate with vit D 315mg/250unit 2 Tablet(s) 2 Tablet(s) Oral two times a day  diltiazem    milliGRAM(s) Oral daily  docusate sodium 100 milliGRAM(s) Oral three times a day PRN  famotidine    Tablet 20 milliGRAM(s) Oral two times a day  furosemide    Tablet 20 milliGRAM(s) Oral daily  magnesium oxide 400 milliGRAM(s) Oral <User Schedule>  multivitamin 1 Tablet(s) Oral daily  mycophenolate mofetil 1000 milliGRAM(s) Oral <User Schedule>  nystatin    Suspension 329104 Unit(s) Oral every 6 hours  pravastatin 20 milliGRAM(s) Oral at bedtime  predniSONE   Tablet 15 milliGRAM(s) Oral <User Schedule>  silver sulfADIAZINE 1% Cream 1 Application(s) Topical two times a day  sodium bicarbonate 650 milliGRAM(s) Oral three times a day  tacrolimus 10 milliGRAM(s) Oral <User Schedule>  valGANciclovir 450 milliGRAM(s) Oral <User Schedule>    Vitals:  T(C): 36.5 (18 @ 03:18), Max: 36.5 (18 @ 00:05)  HR: 93 (18 @ 03:18) (93 - 108)  BP: 134/86 (18 @ 03:18) (134/86 - 141/94)  BP(mean): 113 (18 @ 20:28) (113 - 113)  RR: 18 (18 @ 03:18) (18 - 18)  SpO2: 96% (18 @ 03:18) (95% - 96%)      Daily Height in cm: 172.72 (2018 21:51)    Daily Weight in k.4 (2018 21:51)    Weight (kg): 71 ( @ 21:51)    I&O's Summary    2018 07:01  -  2018 07:00  --------------------------------------------------------  IN: 0 mL / OUT: 750 mL / NET: -750 mL    2018 07:01  -  2018 11:27  --------------------------------------------------------  IN: 0 mL / OUT: 300 mL / NET: -300 mL        Physical Exam:  Appearance: No Acute Distress  HEENT: JVP ___ cm H2O, no HJR  Cardiovascular: RRR, Normal S1 S2, No murmurs/rubs/gallops  Respiratory: Clear to auscultation bilaterally  Gastrointestinal: Soft, Non-tender, non-distended	  Skin: no skin lesions  Neurologic: Non-focal  Extremities: No LE edema, warm and well perfused  Psychiatry: A & O x 3, Mood & affect appropriate      Labs:                        10.1   9.7   )-----------( 314      ( 2018 05:21 )             32.8     04-20    140  |  104  |  29<H>  ----------------------------<  150<H>  5.7<H>   |  24  |  1.33<H>    Ca    8.8      2018 05:12  Mg     2.4         TPro  7.1  /  Alb  4.2  /  TBili  0.5  /  DBili  x   /  AST  34  /  ALT  63<H>  /  AlkPhos  93  19      PT/INR - ( 2018 10:34 )   PT: 11.4 sec;   INR: 1.05 ratio    PTT - ( 2018 10:34 )  PTT:28.8 sec    Tacrolimus 13.1    TELEMETRY:    EXAM:  DUPLEX SCAN EXT VEINS UPPER BI                        Findings: Duplex evaluation of the deep venous system of both upper   extremities was performed to include the brachiocephalic, internal   jugular, subclavian, axillary, brachial, basilic and cephalic veins. In   the right arm, there is nonocclusive thrombus in the brachiocephalic   vein, internal jugular vein, and central portion of the subclavian vein.   This is diminished since the prior exam, but there is persistent abnormal   monophasic venous waveform within the subclavian vein. The axillary,   brachial, basilic and cephalic veins are widely patent.    Moderately large hematoma associated with the right biceps muscle is   again noted, slightly diminished in size.    There is no evidence of DVT in the right upper extremity. Specifically,   nonocclusive thrombus identified within the left axillary vein on prior   exam has since resolved.    Impression: Nonocclusive DVT in the right brachiocephalic, internal   jugular and subclavian veins, decreased since prior exam of .    Interval resolution of nonocclusive DVT in the left axillary vein.      18 Echocardiogram:  Dimensions:    Normal Values:  LA:     4.6    2.0 - 4.0 cm  Ao:     3.9    2.0 - 3.8 cm  SEPTUM: 1.3    0.6 - 1.2 cm  PWT:    1.2    0.6 - 1.1 cm  LVIDd: 5.0    3.0 - 5.6 cm  LVIDs:  3.3    1.8 - 4.0 cm    Conclusions:  Limited TTE  1. Left atrial enlargement secondary to transplant.  2. Mild global left ventricular systolic dysfunction. EF 42-45%  3. Normal right ventricular size with decreased right ventricular systolic function.  4. No pericardial effusion seen. This is a 67 year old man who had ACC/AHA stage D chronic systolic heart failure due to NICM (LVEF 20%) s/p HM2 LVAD  c/b pump thrombus & GIB now s/p OHT  (CMV D-/R+ and Toxo D-/R+), with post-op course c/b primary graft dysfunction, initially thought to be immune-mediated due to positive B-cell flow (negative CDC cross match) and received plasmapharesis/IVIg x2 with improvement in LV function. His echocardiogram on 4/3 showed evidence of slightly worsened left ventricular strain, LVEF of 52% and slightly diminished RV systolic function. It was determined he missed five doses of prednisone. RHC on  showed elevated biventricular filling pressures with normal cardiac output. Endomyocardial biopsy at that time showed 1R/1A cellular rejection. C4D continued to be diffusely positive. DSA was unremarkable. He was discharged home on 4/10.    He was also diagnosed early post-op with bilateral IJ & subclavian thrombi for which he has had systemic AC for the last several weeks, including as an outpatient after discharge last week. His subsequent 3-4 RHC/EMB results revealed no cellular rejection, but with dense C4d positivity indicating complement deposition, and he had persistently elevated filling pressures and normal graft function. He presented for his routine outpatient surveillance RHC/EMB, which was grade 0, AMR 1, with the same elevation in filling pressures. An echocardiogram revealed a marked drop in LV systolic function prompting his admission. Routine labs also revealed a potassium of 6.0.    Overall, though, he reports having done very well as an outpatient. He was feeling good, had a lot of energy, eating well, and without issues. He was quite upset about being admitted. He has no SOB at rest or LOBATO, no orthopnea or PND, and he has not had any LE swelling. His appetite is good and bowel and bladder habits are normal for him. No fevers or chills and he has been taking his medications as directed.      Medications:  amLODIPine   Tablet 5 milliGRAM(s) Oral daily  aspirin enteric coated 81 milliGRAM(s) Oral daily  atovaquone Suspension 1500 milliGRAM(s) Oral daily  calcium citrate with vit D 315mg/250unit 2 Tablet(s) 2 Tablet(s) Oral two times a day  diltiazem    milliGRAM(s) Oral daily  docusate sodium 100 milliGRAM(s) Oral three times a day PRN  famotidine    Tablet 20 milliGRAM(s) Oral two times a day  furosemide    Tablet 20 milliGRAM(s) Oral daily  magnesium oxide 400 milliGRAM(s) Oral <User Schedule>  multivitamin 1 Tablet(s) Oral daily  mycophenolate mofetil 1000 milliGRAM(s) Oral <User Schedule>  nystatin    Suspension 636581 Unit(s) Oral every 6 hours  pravastatin 20 milliGRAM(s) Oral at bedtime  predniSONE   Tablet 15 milliGRAM(s) Oral <User Schedule>  silver sulfADIAZINE 1% Cream 1 Application(s) Topical two times a day  sodium bicarbonate 650 milliGRAM(s) Oral three times a day  tacrolimus 10 milliGRAM(s) Oral <User Schedule>  valGANciclovir 450 milliGRAM(s) Oral <User Schedule>    Vitals:  T(C): 36.5 (18 @ 03:18), Max: 36.5 (18 @ 00:05)  HR: 93 (18 @ 03:18) (93 - 108)  BP: 134/86 (18 @ 03:18) (134/86 - 141/94)  BP(mean): 113 (18 @ 20:28) (113 - 113)  RR: 18 (18 @ 03:18) (18 - 18)  SpO2: 96% (18 @ 03:18) (95% - 96%)      Daily Height in cm: 172.72 (2018 21:51)    Daily Weight in k.4 (2018 21:51)    Weight (kg): 71 ( @ 21:51)    I&O's Summary    2018 07:  -  2018 07:00  --------------------------------------------------------  IN: 0 mL / OUT: 750 mL / NET: -750 mL    2018 07:  -  2018 11:27  --------------------------------------------------------  IN: 0 mL / OUT: 300 mL / NET: -300 mL        Physical Exam:  Appearance: No Acute Distress  HEENT: JVP 10 cm H2O, no HJR  Cardiovascular: RRR, Normal S1 S2, II/VI SM at LLSB, no rubs or gallops  Respiratory: Clear to auscultation bilaterally  Gastrointestinal: Soft, Non-tender, non-distended	  Skin: no skin lesions  Neurologic: Non-focal  Extremities: No LE edema, warm and well perfused  Psychiatry: A & O x 3, Mood & affect appropriate      Labs:                        10.1   9.7   )-----------( 314      ( 2018 05:21 )             32.8     04-20    140  |  104  |  29<H>  ----------------------------<  150<H>  5.7<H>   |  24  |  1.33<H>    Ca    8.8      2018 05:12  Mg     2.4     -    TPro  7.1  /  Alb  4.2  /  TBili  0.5  /  DBili  x   /  AST  34  /  ALT  63<H>  /  AlkPhos  93  04-19      PT/INR - ( 2018 10:34 )   PT: 11.4 sec;   INR: 1.05 ratio    PTT - ( 2018 10:34 )  PTT:28.8 sec    Tacrolimus 13.1    TELEMETRY:    EXAM:  DUPLEX SCAN EXT VEINS UPPER BI                        Findings: Duplex evaluation of the deep venous system of both upper   extremities was performed to include the brachiocephalic, internal   jugular, subclavian, axillary, brachial, basilic and cephalic veins. In   the right arm, there is nonocclusive thrombus in the brachiocephalic   vein, internal jugular vein, and central portion of the subclavian vein.   This is diminished since the prior exam, but there is persistent abnormal   monophasic venous waveform within the subclavian vein. The axillary,   brachial, basilic and cephalic veins are widely patent.    Moderately large hematoma associated with the right biceps muscle is   again noted, slightly diminished in size.    There is no evidence of DVT in the right upper extremity. Specifically,   nonocclusive thrombus identified within the left axillary vein on prior   exam has since resolved.    Impression: Nonocclusive DVT in the right brachiocephalic, internal   jugular and subclavian veins, decreased since prior exam of .    Interval resolution of nonocclusive DVT in the left axillary vein.    18 RHC: , /101, Ao sat 100%, Hgb 8.7, RA 13, RV 48/12, PA 47/21/32, PCWP 25, PA sat 56%, CO/CI (Marino) 5.2/2.6     18 Echocardiogram:  Dimensions:    Normal Values:  LA:     4.6    2.0 - 4.0 cm  Ao:     3.9    2.0 - 3.8 cm  SEPTUM: 1.3    0.6 - 1.2 cm  PWT:    1.2    0.6 - 1.1 cm  LVIDd: 5.0    3.0 - 5.6 cm  LVIDs:  3.3    1.8 - 4.0 cm    Conclusions:  Limited TTE  1. Left atrial enlargement secondary to transplant.  2. Mild global left ventricular systolic dysfunction. EF 42-45%  3. Normal right ventricular size with decreased right ventricular systolic function.  4. No pericardial effusion seen.

## 2018-04-20 NOTE — PROGRESS NOTE ADULT - SUBJECTIVE AND OBJECTIVE BOX
Patient is a 67 y/o M who presents with a chief complaint of volume overload s/p bx (2018 20:47)    HPI:  67M with ACC/AHA stage D chronic systolic heart failure due to NICM s/p HM2 LVAD  c/b pump thrombosis now s/p heart transplant on  (CMV D-/R+ and Toxo D-/R+), with post-op course c/b primary graft dysfunction, initially thought to be immune-mediated due to positive B-cell flow (negative CDC cross match) and received plasmapharesis/IVIg x2 with improvement in LV function. Post-transplant course has been complicated by bilateral IJ/subclavian thrombi and he has a persistent small  right sided pleural effusion as well as chronic renal failure, thought to be related to Prograf.  Echo on 4/3 showed evidence of slight worsened LV strain, LVEF of 52%, and slightly diminished right ventricular systolic function.  RHC on  showed elevated biventricular filling pressures with normal cardiac output. Biopsy showed 1R/1A cellular rejection. C4D continues to be diffusely positive. DSA is unremarkable.  He continued to make progress and was discharged to home on 4/10. Returns today for RHC for cardiac biopsy and was found to have potassium of 6.0.  Received kayexylate in cath lab with +BM. (2018 20:47)    MEDICATIONS  (STANDING):  amLODIPine   Tablet 5 milliGRAM(s) Oral daily  aspirin enteric coated 81 milliGRAM(s) Oral daily  atovaquone Suspension 1500 milliGRAM(s) Oral daily  calcium citrate with vit D 315mg/250unit 2 Tablet(s) 2 Tablet(s) Oral two times a day  diltiazem    milliGRAM(s) Oral daily  famotidine    Tablet 20 milliGRAM(s) Oral two times a day  furosemide    Tablet 20 milliGRAM(s) Oral daily  magnesium oxide 400 milliGRAM(s) Oral <User Schedule>  multivitamin 1 Tablet(s) Oral daily  mycophenolate mofetil 1000 milliGRAM(s) Oral <User Schedule>  nystatin    Suspension 334386 Unit(s) Oral every 6 hours  pravastatin 20 milliGRAM(s) Oral at bedtime  predniSONE   Tablet 15 milliGRAM(s) Oral <User Schedule>  silver sulfADIAZINE 1% Cream 1 Application(s) Topical two times a day  sodium bicarbonate 650 milliGRAM(s) Oral three times a day  tacrolimus 10 milliGRAM(s) Oral <User Schedule>  valGANciclovir 450 milliGRAM(s) Oral <User Schedule>    MEDICATIONS  (PRN):  docusate sodium 100 milliGRAM(s) Oral three times a day PRN Constipation    Vital Signs Last 24 Hrs  T(C): 36.4 (2018 11:30), Max: 36.5 (2018 00:05)  T(F): 97.6 (2018 11:30), Max: 97.7 (2018 00:05)  HR: 100 (2018 13:00) (93 - 108)  BP: 129/97 (2018 13:00) (129/97 - 141/94)  BP(mean): 107 (2018 13:00) (10 - 113)  RR: 18 (2018 13:00) (18 - 29)  SpO2: 99% (2018 13:00) (95% - 100%)    Daily Height in cm: 172.72 (2018 21:51)    Daily Weight in k.4 (2018 21:51)    PHYSICAL EXAM:  General: WN/WD NAD  Eyes: EOMI, clear sclerae  ENT: Moist mucosa  Respiratory: CTA B/L  CV: S1, S2, RRR, no murmurs  Abdominal: Soft, NT, ND +BS  Musculoskeletal/Extremities: full range of motion X 4, no edema  Neurology: A&Ox3, no focal deficits   Skin: No rash, no petechia                     10.1   9.7   )-----------( 314      ( 2018 05:21 )             32.8     Hematocrit: 32.8 % ( @ 05:21)  Hematocrit: 34.8 % ( @ 10:34)        139  |  101  |  30<H>  ----------------------------<  153<H>  4.9   |  25  |  1.31<H>    Ca    8.9      2018 11:59  Mg     2.4         TPro  7.1  /  Alb  4.2  /  TBili  0.5  /  DBili  x   /  AST  34  /  ALT  63<H>  /  AlkPhos  93  04-19    PT/INR - ( 2018 10:34 )   PT: 11.4 sec;   INR: 1.05 ratio    PTT - ( 2018 10:34 )  PTT:28.8 sec

## 2018-04-21 PROBLEM — B19.20 UNSPECIFIED VIRAL HEPATITIS C WITHOUT HEPATIC COMA: Chronic | Status: ACTIVE | Noted: 2018-04-19

## 2018-04-21 LAB
ALBUMIN SERPL ELPH-MCNC: 3.4 G/DL — SIGNIFICANT CHANGE UP (ref 3.3–5)
ALP SERPL-CCNC: 57 U/L — SIGNIFICANT CHANGE UP (ref 40–120)
ALT FLD-CCNC: 33 U/L — SIGNIFICANT CHANGE UP (ref 10–45)
ANION GAP SERPL CALC-SCNC: 11 MMOL/L — SIGNIFICANT CHANGE UP (ref 5–17)
APTT BLD: 25.3 SEC — LOW (ref 27.5–37.4)
APTT BLD: 29.9 SEC — SIGNIFICANT CHANGE UP (ref 27.5–37.4)
AST SERPL-CCNC: 22 U/L — SIGNIFICANT CHANGE UP (ref 10–40)
BILIRUB SERPL-MCNC: 0.5 MG/DL — SIGNIFICANT CHANGE UP (ref 0.2–1.2)
BUN SERPL-MCNC: 32 MG/DL — HIGH (ref 7–23)
CALCIUM SERPL-MCNC: 8.6 MG/DL — SIGNIFICANT CHANGE UP (ref 8.4–10.5)
CHLORIDE SERPL-SCNC: 103 MMOL/L — SIGNIFICANT CHANGE UP (ref 96–108)
CO2 SERPL-SCNC: 26 MMOL/L — SIGNIFICANT CHANGE UP (ref 22–31)
CREAT SERPL-MCNC: 1.41 MG/DL — HIGH (ref 0.5–1.3)
GLUCOSE SERPL-MCNC: 130 MG/DL — HIGH (ref 70–99)
HCT VFR BLD CALC: 33.8 % — LOW (ref 39–50)
HGB BLD-MCNC: 10.4 G/DL — LOW (ref 13–17)
INR BLD: 1.05 RATIO — SIGNIFICANT CHANGE UP (ref 0.88–1.16)
INR BLD: 1.09 RATIO — SIGNIFICANT CHANGE UP (ref 0.88–1.16)
MAGNESIUM SERPL-MCNC: 2.1 MG/DL — SIGNIFICANT CHANGE UP (ref 1.6–2.6)
MCHC RBC-ENTMCNC: 29.2 PG — SIGNIFICANT CHANGE UP (ref 27–34)
MCHC RBC-ENTMCNC: 30.9 GM/DL — LOW (ref 32–36)
MCV RBC AUTO: 94.6 FL — SIGNIFICANT CHANGE UP (ref 80–100)
PHOSPHATE SERPL-MCNC: 4.7 MG/DL — HIGH (ref 2.5–4.5)
PLATELET # BLD AUTO: 263 K/UL — SIGNIFICANT CHANGE UP (ref 150–400)
POTASSIUM SERPL-MCNC: 5 MMOL/L — SIGNIFICANT CHANGE UP (ref 3.5–5.3)
POTASSIUM SERPL-SCNC: 5 MMOL/L — SIGNIFICANT CHANGE UP (ref 3.5–5.3)
PROT SERPL-MCNC: 6 G/DL — SIGNIFICANT CHANGE UP (ref 6–8.3)
PROTHROM AB SERPL-ACNC: 11.4 SEC — SIGNIFICANT CHANGE UP (ref 9.8–12.7)
PROTHROM AB SERPL-ACNC: 11.9 SEC — SIGNIFICANT CHANGE UP (ref 9.8–12.7)
RBC # BLD: 3.58 M/UL — LOW (ref 4.2–5.8)
RBC # FLD: 20.4 % — HIGH (ref 10.3–14.5)
SODIUM SERPL-SCNC: 140 MMOL/L — SIGNIFICANT CHANGE UP (ref 135–145)
TACROLIMUS SERPL-MCNC: 16.8 NG/ML — SIGNIFICANT CHANGE UP
WBC # BLD: 9.5 K/UL — SIGNIFICANT CHANGE UP (ref 3.8–10.5)
WBC # FLD AUTO: 9.5 K/UL — SIGNIFICANT CHANGE UP (ref 3.8–10.5)

## 2018-04-21 PROCEDURE — 99233 SBSQ HOSP IP/OBS HIGH 50: CPT

## 2018-04-21 RX ORDER — DIPHENHYDRAMINE HCL 50 MG
50 CAPSULE ORAL ONCE
Qty: 0 | Refills: 0 | Status: DISCONTINUED | OUTPATIENT
Start: 2018-04-22 | End: 2018-04-22

## 2018-04-21 RX ADMIN — VALGANCICLOVIR 450 MILLIGRAM(S): 450 TABLET, FILM COATED ORAL at 08:17

## 2018-04-21 RX ADMIN — Medication 1 APPLICATION(S): at 05:01

## 2018-04-21 RX ADMIN — Medication 500000 UNIT(S): at 12:28

## 2018-04-21 RX ADMIN — Medication 650 MILLIGRAM(S): at 06:01

## 2018-04-21 RX ADMIN — MYCOPHENOLATE MOFETIL 1000 MILLIGRAM(S): 250 CAPSULE ORAL at 08:15

## 2018-04-21 RX ADMIN — MAGNESIUM OXIDE 400 MG ORAL TABLET 400 MILLIGRAM(S): 241.3 TABLET ORAL at 21:08

## 2018-04-21 RX ADMIN — Medication 500000 UNIT(S): at 17:05

## 2018-04-21 RX ADMIN — Medication 500000 UNIT(S): at 06:00

## 2018-04-21 RX ADMIN — FAMOTIDINE 20 MILLIGRAM(S): 10 INJECTION INTRAVENOUS at 17:05

## 2018-04-21 RX ADMIN — Medication 650 MILLIGRAM(S): at 13:09

## 2018-04-21 RX ADMIN — Medication 20 MILLIGRAM(S): at 21:08

## 2018-04-21 RX ADMIN — TACROLIMUS 10 MILLIGRAM(S): 5 CAPSULE ORAL at 19:49

## 2018-04-21 RX ADMIN — Medication 1 TABLET(S): at 12:29

## 2018-04-21 RX ADMIN — Medication 1 APPLICATION(S): at 17:05

## 2018-04-21 RX ADMIN — Medication 15 MILLIGRAM(S): at 08:15

## 2018-04-21 RX ADMIN — AMLODIPINE BESYLATE 5 MILLIGRAM(S): 2.5 TABLET ORAL at 06:00

## 2018-04-21 RX ADMIN — Medication 15 MILLIGRAM(S): at 19:50

## 2018-04-21 RX ADMIN — FAMOTIDINE 20 MILLIGRAM(S): 10 INJECTION INTRAVENOUS at 05:59

## 2018-04-21 RX ADMIN — TACROLIMUS 10 MILLIGRAM(S): 5 CAPSULE ORAL at 08:16

## 2018-04-21 RX ADMIN — MYCOPHENOLATE MOFETIL 1000 MILLIGRAM(S): 250 CAPSULE ORAL at 19:50

## 2018-04-21 RX ADMIN — Medication 240 MILLIGRAM(S): at 06:00

## 2018-04-21 RX ADMIN — MAGNESIUM OXIDE 400 MG ORAL TABLET 400 MILLIGRAM(S): 241.3 TABLET ORAL at 08:17

## 2018-04-21 RX ADMIN — Medication 20 MILLIGRAM(S): at 06:00

## 2018-04-21 RX ADMIN — Medication 81 MILLIGRAM(S): at 12:29

## 2018-04-21 RX ADMIN — VALGANCICLOVIR 450 MILLIGRAM(S): 450 TABLET, FILM COATED ORAL at 19:50

## 2018-04-21 RX ADMIN — Medication 500000 UNIT(S): at 23:12

## 2018-04-21 RX ADMIN — ATOVAQUONE 1500 MILLIGRAM(S): 750 SUSPENSION ORAL at 12:29

## 2018-04-21 RX ADMIN — Medication 650 MILLIGRAM(S): at 21:08

## 2018-04-21 NOTE — PHYSICAL THERAPY INITIAL EVALUATION ADULT - PERTINENT HX OF CURRENT PROBLEM, REHAB EVAL
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 Found to have b/l IJ/subclavian thrombi & treated w/ ongoing AC. 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

## 2018-04-21 NOTE — PROGRESS NOTE ADULT - SUBJECTIVE AND OBJECTIVE BOX
Medications:  amLODIPine   Tablet 5 milliGRAM(s) Oral daily  aspirin enteric coated 81 milliGRAM(s) Oral daily  atovaquone Suspension 1500 milliGRAM(s) Oral daily  calcium citrate with vit D 315mg/250unit 2 Tablet(s) 2 Tablet(s) Oral two times a day  diltiazem    milliGRAM(s) Oral daily  docusate sodium 100 milliGRAM(s) Oral three times a day PRN  famotidine    Tablet 20 milliGRAM(s) Oral two times a day  furosemide    Tablet 20 milliGRAM(s) Oral daily  magnesium oxide 400 milliGRAM(s) Oral BID  multivitamin 1 Tablet(s) Oral daily  mycophenolate mofetil 1000 milliGRAM(s) Oral 08:00/20:00  nystatin    Suspension 805292 Unit(s) Oral every 6 hours  pravastatin 20 milliGRAM(s) Oral at bedtime  predniSONE   Tablet 15 milliGRAM(s) Oral 08:00/20:00  silver sulfADIAZINE 1% Cream 1 Application(s) Topical two times a day  sodium bicarbonate 650 milliGRAM(s) Oral three times a day  tacrolimus 10 milliGRAM(s) Oral 08:00/20:00  valGANciclovir 450 milliGRAM(s) Oral 08:00/20:00    Vitals:  T(C): 36.8 (18 @ 07:00), Max: 36.8 (18 @ 07:00)  HR: 98 (18 @ 08:00) (90 - 114)  BP: 131/85 (18 @ 07:00) (103/77 - 131/85)  BP(mean): 103 (18 @ 07:00) (10 - 107)  RR: 50 (18 @ 08:00) (12 - 50)  SpO2: 89% (18 @ 08:00) (89% - 100%)      Daily Weight in k.6 (2018 00:00)  Weight (kg): 71 ( @ 21:51)    I&O's Summary    2018 07:01  -  2018 07:00  --------------------------------------------------------  IN: 940 mL / OUT: 1506 mL / NET: -566 mL        Physical Exam:  Appearance: No Acute Distress  HEENT: JVP ___ cm H2O, no HJR  Cardiovascular: RRR, Normal S1 S2, No murmurs/rubs/gallops  Respiratory: Clear to auscultation bilaterally  Gastrointestinal: Soft, Non-tender, non-distended	  Skin: no skin lesions  Neurologic: Non-focal  Extremities: No LE edema, warm and well perfused  Psychiatry: A & O x 3, Mood & affect appropriate      Labs:                        10.4   9.5   )-----------( 263      ( 2018 01:16 )             33.8     -    140  |  103  |  32<H>  ----------------------------<  130<H>  5.0   |  26  |  1.41<H>    Ca    8.6      2018 01:16  Phos  4.7     -  Mg     2.1         TPro  6.0  /  Alb  3.4  /  TBili  0.5  /  DBili  x   /  AST  22  /  ALT  33  /  AlkPhos  57  -      PT/INR - ( 2018 03:07 )   PT: 11.9 sec;   INR: 1.09 ratio    PTT - ( 2018 03:07 )  PTT:29.9 sec    Tacrolimus Trough:   13.3   10.3 Feels well and without complaints. Tolerated PP/IVIg #1 yesterday.  No SOB, no HA or tremor. Normal bowel and bladder habits. Appetite is excellent.    Medications:  amLODIPine   Tablet 5 milliGRAM(s) Oral daily  aspirin enteric coated 81 milliGRAM(s) Oral daily  atovaquone Suspension 1500 milliGRAM(s) Oral daily  calcium citrate with vit D 315mg/250unit 2 Tablet(s) 2 Tablet(s) Oral two times a day  diltiazem    milliGRAM(s) Oral daily  docusate sodium 100 milliGRAM(s) Oral three times a day PRN  famotidine    Tablet 20 milliGRAM(s) Oral two times a day  furosemide    Tablet 20 milliGRAM(s) Oral daily  magnesium oxide 400 milliGRAM(s) Oral BID  multivitamin 1 Tablet(s) Oral daily  mycophenolate mofetil 1000 milliGRAM(s) Oral 08:00/20:00  nystatin    Suspension 240580 Unit(s) Oral every 6 hours  pravastatin 20 milliGRAM(s) Oral at bedtime  predniSONE   Tablet 15 milliGRAM(s) Oral 08:/20:00  silver sulfADIAZINE 1% Cream 1 Application(s) Topical two times a day  sodium bicarbonate 650 milliGRAM(s) Oral three times a day  tacrolimus 10 milliGRAM(s) Oral 08:/20:00  valGANciclovir 450 milliGRAM(s) Oral 08:00/20:00    Vitals:  T(C): 36.8 (18 @ 07:00), Max: 36.8 (18 @ 07:00)  HR: 98 (18 @ 08:00) (90 - 114)  BP: 131/85 (18 @ 07:00) (103/77 - 131/85)  BP(mean): 103 (18 @ 07:00) (10 - 107)  RR: 50 (18 @ 08:00) (12 - 50)  SpO2: 89% (18 @ 08:00) (89% - 100%)      Daily Weight in k.6 (2018 00:00)  Weight (kg): 71 ( @ 21:51)    I&O's Summary    2018 07:01  -  2018 07:00  --------------------------------------------------------  IN: 940 mL / OUT: 1506 mL / NET: -566 mL        Physical Exam:  Appearance: No Acute Distress  HEENT: JVP 10 cm H2O, no HJR  Cardiovascular: RRR, Normal S1 S2, No murmurs/rubs/gallops  Respiratory: Clear to auscultation bilaterally  Gastrointestinal: Soft, Non-tender, non-distended	  Skin: Right 3rd digit wrapped at tip  Neurologic: Non-focal  Extremities: No LE edema, warm and well perfused  Psychiatry: A & O x 3, Mood & affect appropriate      Labs:                        10.4   9.5   )-----------( 263      ( 2018 01:16 )             33.8     04-21    140  |  103  |  32<H>  ----------------------------<  130<H>  5.0   |  26  |  1.41<H>    Ca    8.6      2018 01:16  Phos  4.7     -  Mg     2.1         TPro  6.0  /  Alb  3.4  /  TBili  0.5  /  DBili  x   /  AST  22  /  ALT  33  /  AlkPhos  57  -21      PT/INR - ( 2018 03:07 )   PT: 11.9 sec;   INR: 1.09 ratio    PTT - ( 2018 03:07 )  PTT:29.9 sec    Tacrolimus Trough:   16.8   13.3   10.3

## 2018-04-21 NOTE — PROGRESS NOTE ADULT - ASSESSMENT
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 Found to have b/l IJ/subclavian thrombi as well at that time and was treated with ongoing AC. 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    # s/p OHT now with graft dysfunction - He had his PP/IVIg #1 on 4/20. Treatment #2 scheduled for 4/22. Plan for TTE on Monday morning. Would consider continuing IVIg without PP if graft function improves. Have collected a sample to send to the Immunology lab at [facility] to look for angiotensin type 1 receptor antibodies that have been associated with non-HLA graft impairment (treatment includes ARB).    # Immunosuppression prophylaxis:  Tacrolimus - Therapeutic at current dose. Target trough level 12-14.  CellCept - continue 1000 mg PO BID.  Steroids - would continue 15 mg po BID and not taper yet.  	  # Antimicrobial prophylaxis:  Intermediate risk CMV - continue Valcyte 450 mg po BID  Intermediate risk Toxo - continue Mepron 1500mg PO daily with food.  PCP - continue Mepron 1500mg PO daily  Thrush - continue Nystatin S/S 5 mL QID until prednisone < 10 mg po BID.    # Additional treatments:  - Citracal + D 2 tabs PO BID  - multivitamin 1 tab daily  - Protonix 40 mg po daily fror stress ulcer prophylaxis while on steriods    # Bilateral IJ/Subclavian thrombi -   - Will hold AC until he completes plasmapheresis as it depletes clotting factors. He was supposed to complete a 3 month course.  - CHERIE negative on last admission    # CAV PPx  - Continue ECASA 81mg PO daily  - Continue pravastatin 20mg PO QHS    # Hypertension  - He is currently on cardizem and norvasc for his BP control. Given the possibility that this could be non-HLA rejection, it would be ideal to use losartan instead of norvasc for BP control if his potassium level is under better control.    # ID   - DLES improved and vancomycin stopped given prior NORMAN  - silver sulfadizaine dressings per plastics for R 3rd digit.   - HCV viral load and PCR per protocol. Therapy to be initiated as outpatient by Dr. 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 Found to have b/l IJ/subclavian thrombi as well at that time and was treated with ongoing AC. 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    # s/p OHT now with graft dysfunction - He had his PP/IVIg #1 on 4/20. Treatment #2 scheduled for 4/22. Plan for TTE on Monday morning. Would consider continuing IVIg without PP if graft function improves. Have collected a sample to send to the Immunology lab at [facility] to look for angiotensin type 1 receptor antibodies that have been associated with non-HLA graft impairment (treatment includes ARB). Continue lasix 20 mg po daily for mild volume overload.    # Immunosuppression prophylaxis:  Tacrolimus - Would continue 10 mg BID with food. Level has fluctated over last 3 days so will wait and see level tomorrow before adjusting.  CellCept - continue 1000 mg PO BID.  Steroids - continue 15 mg po BID and not taper yet given likely AMR.  	  # Antimicrobial prophylaxis:  Intermediate risk CMV - continue Valcyte 450 mg po BID. eGFR 59. If renal function declines further, will reduce to daily dosing. Routine CMV PCR testing was negative on 4/19.  Intermediate risk Toxo - continue Mepron 1500mg PO daily with food.  PCP - continue Mepron 1500mg PO daily  Thrush - continue Nystatin S/S 5 mL QID until prednisone < 10 mg po BID.    # Additional treatments:  - Citracal + D 2 tabs PO BID  - multivitamin 1 tab daily  - Protonix 40 mg po daily fror stress ulcer prophylaxis while on steriods    # Bilateral IJ/Subclavian thrombi -   - Will hold AC until he completes plasmapheresis as it depletes clotting factors. He was supposed to complete a 3 month course for UE DVT.  - CHERIE negative on last admission    # CAV PPx  - Continue ECASA 81mg PO daily  - Continue pravastatin 20mg PO QHS    # Hypertension  - He is currently on cardizem and norvasc for his BP control. Given the possibility that this could be non-HLA rejection, it would be ideal to use losartan instead of norvasc for BP control if his potassium level is under better control.    # ID   - DLES improved and wound vac in place. Dressing changes per protocol of CTU. Off antibiotics.  - Right 3rd digit ulceration improving. Review if silver sulfadiazine dressings still required by plastics.   - HCV viral load by PCR 4/19 was 42,855. Therapy to be initiated by Dr. Thomas (Hepatology) in near future. Will review the plan with him.  - Transplant ID following    # endo  - SSI AC & HS    # Hyperkalemia - He received one dose of Kayexalate and K is now 5.0. Also on oral lasix. No further treatment needed at this time. Low K diet.

## 2018-04-22 LAB
ALBUMIN SERPL ELPH-MCNC: 3.5 G/DL — SIGNIFICANT CHANGE UP (ref 3.3–5)
ALP SERPL-CCNC: 68 U/L — SIGNIFICANT CHANGE UP (ref 40–120)
ALT FLD-CCNC: 35 U/L — SIGNIFICANT CHANGE UP (ref 10–45)
AMYLASE P1 CFR SERPL: 62 U/L — SIGNIFICANT CHANGE UP (ref 25–125)
ANION GAP SERPL CALC-SCNC: 10 MMOL/L — SIGNIFICANT CHANGE UP (ref 5–17)
AST SERPL-CCNC: 19 U/L — SIGNIFICANT CHANGE UP (ref 10–40)
BILIRUB SERPL-MCNC: 0.4 MG/DL — SIGNIFICANT CHANGE UP (ref 0.2–1.2)
BUN SERPL-MCNC: 40 MG/DL — HIGH (ref 7–23)
CA-I BLD-SCNC: 1.22 MMOL/L — SIGNIFICANT CHANGE UP (ref 1.12–1.3)
CALCIUM SERPL-MCNC: 8.5 MG/DL — SIGNIFICANT CHANGE UP (ref 8.4–10.5)
CHLORIDE SERPL-SCNC: 102 MMOL/L — SIGNIFICANT CHANGE UP (ref 96–108)
CO2 SERPL-SCNC: 26 MMOL/L — SIGNIFICANT CHANGE UP (ref 22–31)
CREAT SERPL-MCNC: 1.51 MG/DL — HIGH (ref 0.5–1.3)
FIBRINOGEN PPP-MCNC: 312 MG/DL — SIGNIFICANT CHANGE UP (ref 310–510)
GLUCOSE SERPL-MCNC: 212 MG/DL — HIGH (ref 70–99)
HAPTOGLOB SERPL-MCNC: 80 MG/DL — SIGNIFICANT CHANGE UP (ref 34–200)
HCT VFR BLD CALC: 33.1 % — LOW (ref 39–50)
HGB BLD-MCNC: 10.3 G/DL — LOW (ref 13–17)
LDH SERPL L TO P-CCNC: 416 U/L — HIGH (ref 50–242)
LIDOCAIN IGE QN: 14 U/L — SIGNIFICANT CHANGE UP (ref 7–60)
MAGNESIUM SERPL-MCNC: 2.2 MG/DL — SIGNIFICANT CHANGE UP (ref 1.6–2.6)
MCHC RBC-ENTMCNC: 29.6 PG — SIGNIFICANT CHANGE UP (ref 27–34)
MCHC RBC-ENTMCNC: 31.1 GM/DL — LOW (ref 32–36)
MCV RBC AUTO: 95.2 FL — SIGNIFICANT CHANGE UP (ref 80–100)
NT-PROBNP SERPL-SCNC: 3992 PG/ML — HIGH (ref 0–300)
PHOSPHATE SERPL-MCNC: 4.2 MG/DL — SIGNIFICANT CHANGE UP (ref 2.5–4.5)
PLATELET # BLD AUTO: 223 K/UL — SIGNIFICANT CHANGE UP (ref 150–400)
POTASSIUM SERPL-MCNC: 5.3 MMOL/L — SIGNIFICANT CHANGE UP (ref 3.5–5.3)
POTASSIUM SERPL-SCNC: 5.3 MMOL/L — SIGNIFICANT CHANGE UP (ref 3.5–5.3)
PROT SERPL-MCNC: 6.1 G/DL — SIGNIFICANT CHANGE UP (ref 6–8.3)
RBC # BLD: 3.48 M/UL — LOW (ref 4.2–5.8)
RBC # FLD: 20.4 % — HIGH (ref 10.3–14.5)
SODIUM SERPL-SCNC: 138 MMOL/L — SIGNIFICANT CHANGE UP (ref 135–145)
TACROLIMUS SERPL-MCNC: 17.2 NG/ML — SIGNIFICANT CHANGE UP
WBC # BLD: 9.8 K/UL — SIGNIFICANT CHANGE UP (ref 3.8–10.5)
WBC # FLD AUTO: 9.8 K/UL — SIGNIFICANT CHANGE UP (ref 3.8–10.5)

## 2018-04-22 PROCEDURE — 71045 X-RAY EXAM CHEST 1 VIEW: CPT | Mod: 26

## 2018-04-22 PROCEDURE — 36514 APHERESIS PLASMA: CPT

## 2018-04-22 PROCEDURE — 99233 SBSQ HOSP IP/OBS HIGH 50: CPT

## 2018-04-22 PROCEDURE — 99223 1ST HOSP IP/OBS HIGH 75: CPT

## 2018-04-22 PROCEDURE — 99233 SBSQ HOSP IP/OBS HIGH 50: CPT | Mod: 25

## 2018-04-22 RX ORDER — TACROLIMUS 5 MG/1
9 CAPSULE ORAL
Qty: 0 | Refills: 0 | Status: DISCONTINUED | OUTPATIENT
Start: 2018-04-22 | End: 2018-04-23

## 2018-04-22 RX ORDER — IMMUNE GLOBULIN,GAMMA(IGG) 5 %
10 VIAL (ML) INTRAVENOUS ONCE
Qty: 0 | Refills: 0 | Status: COMPLETED | OUTPATIENT
Start: 2018-04-22 | End: 2018-04-22

## 2018-04-22 RX ORDER — ACETAMINOPHEN 500 MG
650 TABLET ORAL ONCE
Qty: 0 | Refills: 0 | Status: COMPLETED | OUTPATIENT
Start: 2018-04-22 | End: 2018-04-22

## 2018-04-22 RX ORDER — DIPHENHYDRAMINE HCL 50 MG
50 CAPSULE ORAL ONCE
Qty: 0 | Refills: 0 | Status: COMPLETED | OUTPATIENT
Start: 2018-04-22 | End: 2018-04-22

## 2018-04-22 RX ORDER — VALGANCICLOVIR 450 MG/1
450 TABLET, FILM COATED ORAL
Qty: 0 | Refills: 0 | Status: DISCONTINUED | OUTPATIENT
Start: 2018-04-22 | End: 2018-04-23

## 2018-04-22 RX ORDER — DIPHENHYDRAMINE HCL 50 MG
25 CAPSULE ORAL ONCE
Qty: 0 | Refills: 0 | Status: COMPLETED | OUTPATIENT
Start: 2018-04-22 | End: 2018-04-22

## 2018-04-22 RX ADMIN — Medication 25 MILLIGRAM(S): at 13:55

## 2018-04-22 RX ADMIN — Medication 20 MILLIGRAM(S): at 05:15

## 2018-04-22 RX ADMIN — Medication 1 APPLICATION(S): at 05:17

## 2018-04-22 RX ADMIN — TACROLIMUS 9 MILLIGRAM(S): 5 CAPSULE ORAL at 20:17

## 2018-04-22 RX ADMIN — Medication 650 MILLIGRAM(S): at 21:29

## 2018-04-22 RX ADMIN — FAMOTIDINE 20 MILLIGRAM(S): 10 INJECTION INTRAVENOUS at 17:51

## 2018-04-22 RX ADMIN — AMLODIPINE BESYLATE 5 MILLIGRAM(S): 2.5 TABLET ORAL at 05:16

## 2018-04-22 RX ADMIN — FAMOTIDINE 20 MILLIGRAM(S): 10 INJECTION INTRAVENOUS at 05:15

## 2018-04-22 RX ADMIN — VALGANCICLOVIR 450 MILLIGRAM(S): 450 TABLET, FILM COATED ORAL at 09:31

## 2018-04-22 RX ADMIN — MAGNESIUM OXIDE 400 MG ORAL TABLET 400 MILLIGRAM(S): 241.3 TABLET ORAL at 20:16

## 2018-04-22 RX ADMIN — TACROLIMUS 10 MILLIGRAM(S): 5 CAPSULE ORAL at 09:30

## 2018-04-22 RX ADMIN — Medication 15 MILLIGRAM(S): at 09:33

## 2018-04-22 RX ADMIN — Medication 20 MILLIGRAM(S): at 21:29

## 2018-04-22 RX ADMIN — Medication 500000 UNIT(S): at 05:15

## 2018-04-22 RX ADMIN — Medication 650 MILLIGRAM(S): at 13:08

## 2018-04-22 RX ADMIN — Medication 650 MILLIGRAM(S): at 13:54

## 2018-04-22 RX ADMIN — Medication 15 MILLIGRAM(S): at 20:17

## 2018-04-22 RX ADMIN — Medication 240 MILLIGRAM(S): at 05:36

## 2018-04-22 RX ADMIN — Medication 1 APPLICATION(S): at 17:51

## 2018-04-22 RX ADMIN — Medication 500000 UNIT(S): at 17:52

## 2018-04-22 RX ADMIN — Medication 1 TABLET(S): at 13:08

## 2018-04-22 RX ADMIN — MYCOPHENOLATE MOFETIL 1000 MILLIGRAM(S): 250 CAPSULE ORAL at 20:17

## 2018-04-22 RX ADMIN — Medication 650 MILLIGRAM(S): at 05:16

## 2018-04-22 RX ADMIN — Medication 500000 UNIT(S): at 13:08

## 2018-04-22 RX ADMIN — Medication 81 MILLIGRAM(S): at 13:08

## 2018-04-22 RX ADMIN — ATOVAQUONE 1500 MILLIGRAM(S): 750 SUSPENSION ORAL at 13:08

## 2018-04-22 RX ADMIN — Medication 50 GRAM(S): at 14:27

## 2018-04-22 RX ADMIN — MAGNESIUM OXIDE 400 MG ORAL TABLET 400 MILLIGRAM(S): 241.3 TABLET ORAL at 09:34

## 2018-04-22 RX ADMIN — MYCOPHENOLATE MOFETIL 1000 MILLIGRAM(S): 250 CAPSULE ORAL at 09:32

## 2018-04-22 NOTE — PROGRESS NOTE ADULT - SUBJECTIVE AND OBJECTIVE BOX
Interval History:    Medications:  amLODIPine   Tablet 5 milliGRAM(s) Oral daily  aspirin enteric coated 81 milliGRAM(s) Oral daily  atovaquone Suspension 1500 milliGRAM(s) Oral daily  calcium citrate with vit D 315mg/250unit 2 Tablet(s) 2 Tablet(s) Oral two times a day  diltiazem    milliGRAM(s) Oral daily  diphenhydrAMINE   Injectable 50 milliGRAM(s) IV Push once  docusate sodium 100 milliGRAM(s) Oral three times a day PRN  famotidine    Tablet 20 milliGRAM(s) Oral two times a day  magnesium oxide 400 milliGRAM(s) Oral <User Schedule>  multivitamin 1 Tablet(s) Oral daily  mycophenolate mofetil 1000 milliGRAM(s) Oral <User Schedule>  nystatin    Suspension 943390 Unit(s) Oral every 6 hours  pravastatin 20 milliGRAM(s) Oral at bedtime  predniSONE   Tablet 15 milliGRAM(s) Oral <User Schedule>  silver sulfADIAZINE 1% Cream 1 Application(s) Topical two times a day  sodium bicarbonate 650 milliGRAM(s) Oral three times a day  tacrolimus 10 milliGRAM(s) Oral <User Schedule>  valGANciclovir 450 milliGRAM(s) Oral <User Schedule>    Vitals:  T(C): 36 (18 @ 00:00), Max: 37.3 (18 @ 15:00)  HR: 104 (18 @ 08:00) (92 - 114)  BP: 124/83 (18 @ 08:00) (93/74 - 127/91)  BP(mean): 95 (18 @ 08:00) (76 - 106)  RR: 51 (18 @ 08:00) (10 - 51)  SpO2: 100% (18 @ 08:00) (99% - 100%)      Daily Weight in k.1 (2018 00:00)      I&O's Summary    2018 07:01  -  2018 07:00  --------------------------------------------------------  IN: 850 mL / OUT: 1700 mL / NET: -850 mL        Physical Exam:  Appearance: No Acute Distress  HEENT: JVP ___ cm H2O, no HJR  Cardiovascular: RRR, Normal S1 S2, No murmurs/rubs/gallops  Respiratory: Clear to auscultation bilaterally  Gastrointestinal: Soft, Non-tender, non-distended	  Skin: no skin lesions  Neurologic: Non-focal  Extremities: No LE edema, warm and well perfused  Psychiatry: A & O x 3, Mood & affect appropriate      Labs:                        10.3   9.8   )-----------( 223      ( 2018 02:29 )             33.1         138  |  102  |  40<H>  ----------------------------<  212<H>  5.3   |  26  |  1.51<H>    Ca    8.5      2018 02:29  Phos  4.2       Mg     2.2         TPro  6.1  /  Alb  3.5  /  TBili  0.4  /  DBili  x   /  AST  19  /  ALT  35  /  AlkPhos  68        PT/INR - ( 2018 18:25 )   PT: 11.4 sec;   INR: 1.05 ratio    PTT - ( 2018 18:25 )  PTT:25.3 sec      Serum Pro-Brain Natriuretic Peptide: 3992 pg/mL ( @ 02:29)    Lactate Dehydrogenase, Serum: 416 U/L ( @ 02:29)          TELEMETRY:    [ ] Echocardiogram: Yoseph is feeling really well. No complaints and he is tolerating his treatments well.    Medications:  amLODIPine   Tablet 5 milliGRAM(s) Oral daily  aspirin enteric coated 81 milliGRAM(s) Oral daily  atovaquone Suspension 1500 milliGRAM(s) Oral daily  calcium citrate with vit D 315mg/250unit 2 Tablet(s) 2 Tablet(s) Oral two times a day  diltiazem    milliGRAM(s) Oral daily  diphenhydrAMINE   Injectable 50 milliGRAM(s) IV Push once  docusate sodium 100 milliGRAM(s) Oral three times a day PRN  famotidine    Tablet 20 milliGRAM(s) Oral two times a day  magnesium oxide 400 milliGRAM(s) Oral <User Schedule>  multivitamin 1 Tablet(s) Oral daily  mycophenolate mofetil 1000 milliGRAM(s) Oral <User Schedule>  nystatin    Suspension 598654 Unit(s) Oral every 6 hours  pravastatin 20 milliGRAM(s) Oral at bedtime  predniSONE   Tablet 15 milliGRAM(s) Oral <User Schedule>  silver sulfADIAZINE 1% Cream 1 Application(s) Topical two times a day  sodium bicarbonate 650 milliGRAM(s) Oral three times a day  tacrolimus 10 milliGRAM(s) Oral <User Schedule>  valGANciclovir 450 milliGRAM(s) Oral <User Schedule>    Vitals:  T(C): 36 (18 @ 00:00), Max: 37.3 (18 @ 15:00)  HR: 104 (18 @ 08:00) (92 - 114)  BP: 124/83 (18 @ 08:00) (93/74 - 127/91)  BP(mean): 95 (18 @ 08:00) (76 - 106)  RR: 51 (18 @ 08:00) (10 - 51)  SpO2: 100% (18 @ 08:00) (99% - 100%)      Daily Weight in k.1 (2018 00:00)      I&O's Summary    2018 07:01  -  2018 07:00  --------------------------------------------------------  IN: 850 mL / OUT: 1700 mL / NET: -850 mL        Physical Exam:  Appearance: No Acute Distress  HEENT: JVP ___ cm H2O, no HJR  Cardiovascular: RRR, Normal S1 S2, No murmurs/rubs/gallops  Respiratory: Clear to auscultation bilaterally  Gastrointestinal: Soft, Non-tender, non-distended	  Skin: no skin lesions  Neurologic: Non-focal  Extremities: No LE edema, warm and well perfused  Psychiatry: A & O x 3, Mood & affect appropriate      Labs:                        10.3   9.8   )-----------( 223      ( 2018 02:29 )             33.1         138  |  102  |  40<H>  ----------------------------<  212<H>  5.3   |  26  |  1.51<H>    Ca    8.5      2018 02:29  Phos  4.2       Mg     2.2         TPro  6.1  /  Alb  3.5  /  TBili  0.4  /  DBili  x   /  AST  19  /  ALT  35  /  AlkPhos  68        PT/INR - ( 2018 18:25 )   PT: 11.4 sec;   INR: 1.05 ratio    PTT - ( 2018 18:25 )  PTT:25.3 sec      Serum Pro-Brain Natriuretic Peptide: 3992 pg/mL ( @ 02:29)    Lactate Dehydrogenase, Serum: 416 U/L ( @ 02:29)    Tacrolimus 17.2

## 2018-04-22 NOTE — CONSULT NOTE ADULT - SUBJECTIVE AND OBJECTIVE BOX
Patient is a 68y old  Male who presents with a chief complaint of volume overload s/p bx on 2018.      HPI:  67M status post heart transplant heart transplant on  with post-op course c/b primary graft dysfunction, initially thought to be immune-mediated due to positive B-cell flow (negative CDC cross match) and received plasmapharesis/IVIg x2 with improvement in LV function. Post-transplant course has been complicated by bilateral IJ/subclavian thrombi and he has a persistent small  right sided pleural effusion as well as chronic renal failure, thought to be related to Prograf.  Echo on 4/3 showed evidence of slight worsened LV strain, LVEF of 52%, and slightly diminished right ventricular systolic function.  RHC on  showed elevated biventricular filling pressures with normal cardiac output. Biopsy showed 1R/1A cellular rejection. C4D continues to be diffusely positive. DSA is unremarkable.  He continued to make progress and was discharged to home on 4/10. 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 He had his Plasmapheresis/IVIg #1 on . Treatment #2 2018.    PAST MEDICAL & SURGICAL HISTORY:  DVT of upper extremity (deep vein thrombosis)  Hepatitis C virus  GIB (gastrointestinal bleeding)  Ventricular fibrillation: s/p AICD  PAF (paroxysmal atrial fibrillation): on xarelto  Non-Ischemic Cardiomyopathy  SVT (Supraventricular Tachycardia)  HTN  CHF (Congestive Heart Failure)  H/O heart transplant: 2018  LVAD (left ventricular assist device) present  Status post left hip replacement  History of Prior Ablation Treatment: for afib  AICD (Automatic Cardioverter/Defibrillator) Present: [employer] with 1 [employer] lead09- explanted and replaced with [employer] 2 leads on 09      MEDICATIONS  (STANDING):  amLODIPine   Tablet 5 milliGRAM(s) Oral daily  aspirin enteric coated 81 milliGRAM(s) Oral daily  atovaquone Suspension 1500 milliGRAM(s) Oral daily  calcium citrate with vit D 315mg/250unit 2 Tablet(s) 2 Tablet(s) Oral two times a day  diltiazem    milliGRAM(s) Oral daily  famotidine    Tablet 20 milliGRAM(s) Oral two times a day  magnesium oxide 400 milliGRAM(s) Oral <User Schedule>  multivitamin 1 Tablet(s) Oral daily  mycophenolate mofetil 1000 milliGRAM(s) Oral <User Schedule>  nystatin    Suspension 086581 Unit(s) Oral every 6 hours  pravastatin 20 milliGRAM(s) Oral at bedtime  predniSONE   Tablet 15 milliGRAM(s) Oral <User Schedule>  silver sulfADIAZINE 1% Cream 1 Application(s) Topical two times a day  sodium bicarbonate 650 milliGRAM(s) Oral three times a day  tacrolimus 10 milliGRAM(s) Oral <User Schedule>  valGANciclovir 450 milliGRAM(s) Oral <User Schedule>    MEDICATIONS  (PRN):  docusate sodium 100 milliGRAM(s) Oral three times a day PRN Constipation    FAMILY HISTORY:  No pertinent family history in first degree relatives    ALLERGIES:  No Known Allergies or intolerances.    REVIEW OF SYSTEMS:    CONSTITUTIONAL: No weakness, fevers or chills.  EYES/ENT: No visual changes;  No vertigo or throat pain   RESPIRATORY: No cough, wheezing, hemoptysis; No shortness of breath  CARDIOVASCULAR: No chest pain or palpitations  GASTROINTESTINAL: No abdominal or epigastric pain. No nausea, vomiting, or hematemesis; No diarrhea or constipation. No melena or hematochezia.  MUSCULOSKELETAL: Full range of motion  NEUROLOGICAL: No numbness or weakness  HEMATOLOGY: No anemia, no bleeding  SKIN: No itching, burning, rashes, petechia, or lesions  ENDOCRINE: No diabetes, no thyroid disease    Vital Signs Last 24 Hrs  T(C): 36 (2018 00:00), Max: 37.3 (2018 15:00)  T(F): 96.8 (2018 00:00), Max: 99.1 (2018 15:00)  HR: 101 (2018 09:00) (92 - 114)  BP: 108/75 (2018 09:00) (93/74 - 127/91)  BP(mean): 88 (2018 09:00) (76 - 106)  RR: 19 (2018 09:00) (10 - 51)  SpO2: 100% (2018 09:00) (99% - 100%)    Daily     Daily Weight in k.1 (2018 00:00)    PHYSICAL EXAM:  General: WN/WD NAD. Resting comfortably and having breakfast.  Eyes: No jaundice  ENT: Moist mucosae, no discharges.  Respiratory: CTA B/L  CV: RRR, no murmurs  Abdominal: Soft, NT, ND +BS  Musculoskeletal/Extremities: full range of motion X 4, no edema  Neurology: A&Ox3, no focal deficits   Skin: No rash, no petechia  Catheters/Tubes/Wires: Left Internal Jugular CVC.                          10.3   9.8   )-----------( 223      ( 2018 02:29 )             33.1       Hematocrit: 33.1 % ( @ 02:29)  Hematocrit: 33.8 % ( @ 01:16)  Hematocrit: 32.8 % ( @ 05:21)  Hematocrit: 34.8 % ( @ 10:34)        138  |  102  |  40<H>  ----------------------------<  212<H>  5.3   |  26  |  1.51<H>    Ca    8.5      2018 02:29  Phos  4.2       Mg     2.2         TPro  6.1  /  Alb  3.5  /  TBili  0.4  /  DBili  x   /  AST  19  /  ALT  35  /  AlkPhos  68      Lactate Dehydrogenase, Serum: 416 U/L ( @ 02:29)    Haptoglobin, Serum: 80 mg/dL ( @ 05:13)    PT/INR - ( 2018 18:25 )   PT: 11.4 sec;   INR: 1.05 ratio         PTT - ( 2018 18:25 )  PTT: 25.3 sec  Fibrinogen Assay: 312 mg/dL ( @ 08:38)  Ionized Calcium: 1.22 mmol/L (2018 @8:38)

## 2018-04-22 NOTE — CONSULT NOTE ADULT - ASSESSMENT
68 y/o M s/p heart transplant on 2/23/2018, who had graft dysfunction thought to represent antibody-mediated rejection (AMR) in the immediate aftermath. The patient was treated with therapeutic plasma exchange (TPE) + IVIg  x 2 at that time, responding with improved LV function. He now presents with recurring graft dysfunction, which again could represent AMR, even though no donor-specific Anti-HLA antibodies has been detected. TPE every other day initiated on 04/20/2018, one plasma volume, with FFP as replacement fluid.    I have evaluated the patient's clinical condition and coagulation tests results, and in consultation with Dr. Jeremiah Deshpande decided to do TPE #2 today, one plasma volume with 5% albumin as replacement fluid.    TPE #3 is scheduled on Tuesday 04/24/2018. We will continue to monitor the patient's clinical condition and coagulation tests results to decide on replacement fluid combination.

## 2018-04-22 NOTE — PROGRESS NOTE ADULT - ASSESSMENT
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 Found to have b/l IJ/subclavian thrombi as well at that time and was treated with ongoing AC. 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    # s/p OHT now with graft dysfunction - He had his PP/IVIg #1 on 4/20. Treatment #2 today. Plan for TTE on Monday morning. Would consider continuing IVIg without PP if graft function improves. Will send pre-treatment and post-treatment samples to [place] to look for angiotensin type 1 receptor antibodies that have been associated with non-HLA graft impairment (treatment includes ARB). Discontinue lasix today.    # Immunosuppression prophylaxis:  Tacrolimus - Would continue 10 mg BID with food. Level has fluctated over last 3 days so will wait and see level tomorrow before adjusting.  CellCept - continue 1000 mg PO BID.  Steroids - continue 15 mg po BID and not taper yet given likely AMR.  	  # Antimicrobial prophylaxis:  Intermediate risk CMV - continue Valcyte 450 mg po BID. eGFR 59. If renal function declines further, will reduce to daily dosing. Routine CMV PCR testing was negative on 4/19.  Intermediate risk Toxo - continue Mepron 1500mg PO daily with food.  PCP - continue Mepron 1500mg PO daily  Thrush - continue Nystatin S/S 5 mL QID until prednisone < 10 mg po BID.    # Additional treatments:  - Citracal + D 2 tabs PO BID  - multivitamin 1 tab daily  - Protonix 40 mg po daily fror stress ulcer prophylaxis while on steriods    # Bilateral IJ/Subclavian thrombi -   - Will hold AC until he completes plasmapheresis as it depletes clotting factors. He was supposed to complete a 3 month course for UE DVT.  - CHERIE negative on last admission    # CAV PPx  - Continue ECASA 81mg PO daily  - Continue pravastatin 20mg PO QHS    # Hypertension  - He is currently on cardizem and norvasc for his BP control. Given the possibility that this could be non-HLA rejection, it would be ideal to use losartan instead of norvasc for BP control if his potassium level is under better control.    # ID   - DLES improved and wound vac in place. Dressing changes per protocol of CTU. Off antibiotics.  - Right 3rd digit ulceration improving. Review if silver sulfadiazine dressings still required by plastics.   - HCV viral load by PCR 4/19 was 42,855. Therapy to be initiated by Dr. Thomas (Hepatology) in near future. Will review the plan with him.  - Transplant ID following    # endo  - SSI AC & HS    # Hyperkalemia - He received one dose of Kayexalate and K is now 5.0. Also on oral lasix. No further treatment needed at this time. Low K diet. 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 Found to have b/l IJ/subclavian thrombi as well at that time and was treated with ongoing AC. 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    # s/p OHT now with graft dysfunction - He had his PP/IVIg #1 on 4/20. Treatment #2 today. Plan for TTE on Monday morning. Would consider continuing IVIg without PP if graft function improves. Will send pre-treatment and post-treatment samples to [place] to look for angiotensin type 1 receptor antibodies that have been associated with non-HLA graft impairment (treatment includes ARB). Discontinue lasix today.    # Immunosuppression prophylaxis:  Tacrolimus - Would reduce to 9 mg BID with food given trough has been rising for the last 3 checks.  CellCept - continue 1000 mg PO BID.  Steroids - continue 15 mg po BID and not taper yet given likely AMR.  	  # Antimicrobial prophylaxis:  Intermediate risk CMV - reduce Valcyte to 450 mg po daily as eGFR < 60. Routine CMV PCR testing was negative on 4/19.  Intermediate risk Toxo - continue Mepron 1500mg PO daily with food.  PCP - continue Mepron 1500mg PO daily  Thrush - continue Nystatin S/S 5 mL QID until prednisone < 10 mg po BID.    # Additional treatments:  - Citracal + D 2 tabs PO BID  - multivitamin 1 tab daily  - Protonix 40 mg po daily for stress ulcer prophylaxis while on steroids    # Bilateral IJ/Subclavian thrombi -   - Will hold AC until he completes plasmapheresis as it depletes clotting factors. He was supposed to complete a 3 month course for UE DVT.  - CHERIE negative on last admission    # CAV PPx  - Continue ECASA 81mg PO daily  - Continue pravastatin 20mg PO QHS    # Hypertension  - He is currently on cardizem and norvasc for his BP control. Given the possibility that this could be non-HLA rejection, it would be ideal to use losartan instead of norvasc for BP control if his potassium level is under better control.    # ID   - DLES improved and wound vac in place. Dressing changes per protocol of CTU. Off antibiotics.  - Right 3rd digit ulceration improving. Review if silver sulfadiazine dressings still required by plastics.   - HCV viral load by PCR 4/19 was 42,855. Therapy to be initiated by Dr. Thomas (Hepatology) after discharge.  - Transplant ID following    # endo  - SSI AC & HS    # Hyperkalemia - He received one dose of Kayexalate and K is now stable. No further treatment needed at this time. Low K diet.

## 2018-04-22 NOTE — PROGRESS NOTE ADULT - SUBJECTIVE AND OBJECTIVE BOX
CHIEF COMPLAINT: SOB     HPI:  67M with ACC/AHA stage D chronic systolic heart failure due to NICM s/p HM2 LVAD  c/b pump thrombosis now s/p heart transplant on  (CMV D-/R+ and Toxo D-/R+), with post-op course c/b primary graft dysfunction, initially thought to be immune-mediated due to positive B-cell flow (negative CDC cross match) and received plasmapharesis/IVIg x2 with improvement in LV function. Post-transplant course has been complicated by bilateral IJ/subclavian thrombi and he has a persistent small  right sided pleural effusion as well as chronic renal failure, thought to be related to Prograf.  Echo on 4/3 showed evidence of slight worsened LV strain, LVEF of 52%, and slightly diminished right ventricular systolic function.  RHC on  showed elevated biventricular filling pressures with normal cardiac output. Biopsy showed 1R/1A cellular rejection. C4D continues to be diffusely positive. DSA is unremarkable.    He continued to make progress and was discharged to home on 4/10, re-admitted to the hospital for RHC for cardiac biopsy and was found to have potassium of 6 and progressive rejection.    PAST MEDICAL & SURGICAL HISTORY:  DVT of upper extremity (deep vein thrombosis)  Hepatitis C virus  GIB (gastrointestinal bleeding)  Ventricular fibrillation: s/p AICD  PAF (paroxysmal atrial fibrillation): on xarelto  Non-Ischemic Cardiomyopathy  SVT (Supraventricular Tachycardia)  HTN  CHF (Congestive Heart Failure)  H/O heart transplant: 2018  LVAD (left ventricular assist device) present  Status post left hip replacement  History of Prior Ablation Treatment: for afib  AICD (Automatic Cardioverter/Defibrillator) Present: [employer] with 1 [employer] lead09- explanted and replaced with [employer] 2 leads on 09      FAMILY HISTORY:  No pertinent family history in first degree relatives    Social History:    Marital Status:  X    (   ) Single    (   )    (  )   Occupation: Retired   Lives with: (  ) alone  (  ) children  X spouse   (  ) parents  (  ) other    Substance Use (street drugs): X never used  (  ) other:  Tobacco Usage: X never smoked   (   ) former smoker   (   ) current smoker  (     ) pack year  (    ) last cigarette date  Alcohol Usage: Social      OBJECTIVE:  Vital Signs Last 24 Hrs  T(C): 36 (2018 00:00), Max: 37.3 (2018 15:00)  T(F): 96.8 (2018 00:00), Max: 99.1 (2018 15:00)  HR: 101 (2018 11:00) (92 - 114)  BP: 117/77 (2018 11:00) (93/74 - 127/91)  BP(mean): 90 (2018 11:00) (76 - 106)  RR: 48 (2018 11:00) (10 - 51)  SpO2: 100% (2018 11:00) (99% - 100%)       @ 07:  -   @ 07:00  --------------------------------------------------------  IN: 850 mL / OUT: 1700 mL / NET: -850 mL     @ 07: @ 11:28  --------------------------------------------------------  IN: 0 mL / OUT: 200 mL / NET: -200 mL      HOSPITAL MEDICATIONS:  amLODIPine   Tablet 5 milliGRAM(s) Oral daily  aspirin enteric coated 81 milliGRAM(s) Oral daily  atovaquone Suspension 1500 milliGRAM(s) Oral daily  calcium citrate with vit D 315mg/250unit 2 Tablet(s) 2 Tablet(s) Oral two times a day  diltiazem    milliGRAM(s) Oral daily  docusate sodium 100 milliGRAM(s) Oral three times a day PRN  famotidine    Tablet 20 milliGRAM(s) Oral two times a day  magnesium oxide 400 milliGRAM(s) Oral <User Schedule>  multivitamin 1 Tablet(s) Oral daily  mycophenolate mofetil 1000 milliGRAM(s) Oral <User Schedule>  nystatin    Suspension 865334 Unit(s) Oral every 6 hours  pravastatin 20 milliGRAM(s) Oral at bedtime  predniSONE   Tablet 15 milliGRAM(s) Oral <User Schedule>  silver sulfADIAZINE 1% Cream 1 Application(s) Topical two times a day  sodium bicarbonate 650 milliGRAM(s) Oral three times a day  tacrolimus 10 milliGRAM(s) Oral <User Schedule>  valGANciclovir 450 milliGRAM(s) Oral <User Schedule>    No Known Allergies    CONSTITUTIONAL: No fever, weight loss, or fatigue  EYES: No eye pain, visual disturbances, or discharge  ENMT:  No difficulty hearing, tinnitus, vertigo; No sinus or throat pain  NECK: No pain or stiffness  BREASTS: No pain, masses, or nipple discharge  RESPIRATORY: No cough, wheezing, chills or hemoptysis; No shortness of breath  CARDIOVASCULAR: No chest pain, palpitations, dizziness, or leg swelling  GASTROINTESTINAL: No abdominal or epigastric pain. No nausea, vomiting, or hematemesis; No diarrhea or constipation. No melena or hematochezia.  GENITOURINARY: No dysuria, frequency, hematuria, or incontinence  NEUROLOGICAL: No headaches, memory loss, loss of strength, numbness, or tremors  SKIN: No itching, burning, rashes, or lesions   LYMPH NODES: No enlarged glands  ENDOCRINE: No heat or cold intolerance; No hair loss  MUSCULOSKELETAL: No joint pain or swelling; No muscle, back, or extremity pain  PSYCHIATRIC: No depression, anxiety, mood swings, or difficulty sleeping  HEME/LYMPH: No easy bruising, or bleeding gums  ALLERY AND IMMUNOLOGIC: No hives or eczema      PHYSICAL EXAM:Daily     Daily Weight in k.1 (2018 00:00)  HEENT:     + NCAT  + EOMI  - Conjunctival edema   - Icterus   - Thrush   - ETT  - NGT/OGT  Neck:         + FROM    + JVD     - Nodes     - Masses    + Mid-line trachea   - Tracheostomy  Chest:         - Sternal click  - Sternal drainage  - Pacing wires  - Chest tubes  - SubQ emphysema  Lungs:          + CTA   - Rhonchi    - Rales    - Wheezing     - Decreased BS   - Dullness R L  Cardiac:       + S1 + S2    + RRR   - Irregular   - S3  - S4    - Murmurs   - Rub   - Hamman’s sign   Abdomen:    + BS     + Soft    + Non-tender     - Distended    - Organomegaly  - PEG  Extremities:   - Cyanosis U/L   - Clubbing  U/L  - LE/UE Edema   + Capillary refill    + Pulses   Neuro:        + Awake   +  Alert   - Confused   - Lethargic   - Sedated   - Generalized Weakness  Skin:        - Rashes    - Erythema   + Normal incisions   + IV sites intact  - Sacral decubitus    LABS:                        10.3   9.8   )-----------( 223      ( 2018 02:29 )             33.1         138  |  102  |  40<H>  ----------------------------<  212<H>  5.3   |  26  |  1.51<H>    Ca    8.5      2018 02:29  Phos  4.2       Mg     2.2         TPro  6.1  /  Alb  3.5  /  TBili  0.4  /  DBili  x   /  AST  19  /  ALT  35  /  AlkPhos  68      PT/INR - ( 2018 18:25 )   PT: 11.4 sec;   INR: 1.05 ratio         PTT - ( 2018 18:25 )  PTT:25.3 sec  LIVER FUNCTIONS - ( 2018 02:29 )  Alb: 3.5 g/dL / Pro: 6.1 g/dL / ALK PHOS: 68 U/L / ALT: 35 U/L / AST: 19 U/L / GGT: x            RADIOLOGY:  X Reviewed and interpreted by me    Assessment: 1. S/P Heart transplant with acute rejection    Plan:    Patient receiving plasmapheresis with albumin based regimen instead of FFP ASA continued for graft occlusion-thromboembolism prophylaxis  Pepcid maintained for GI bleeding prophylaxis  Immunosuppression as per Heart Failure  Reviewed & addressed medication regimen

## 2018-04-23 LAB
ALBUMIN SERPL ELPH-MCNC: 4.3 G/DL — SIGNIFICANT CHANGE UP (ref 3.3–5)
ALP SERPL-CCNC: 44 U/L — SIGNIFICANT CHANGE UP (ref 40–120)
ALT FLD-CCNC: 25 U/L — SIGNIFICANT CHANGE UP (ref 10–45)
ANION GAP SERPL CALC-SCNC: 11 MMOL/L — SIGNIFICANT CHANGE UP (ref 5–17)
ANION GAP SERPL CALC-SCNC: 14 MMOL/L — SIGNIFICANT CHANGE UP (ref 5–17)
ANION GAP SERPL CALC-SCNC: 9 MMOL/L — SIGNIFICANT CHANGE UP (ref 5–17)
AST SERPL-CCNC: 17 U/L — SIGNIFICANT CHANGE UP (ref 10–40)
BILIRUB SERPL-MCNC: 0.5 MG/DL — SIGNIFICANT CHANGE UP (ref 0.2–1.2)
BUN SERPL-MCNC: 30 MG/DL — HIGH (ref 7–23)
BUN SERPL-MCNC: 32 MG/DL — HIGH (ref 7–23)
BUN SERPL-MCNC: 33 MG/DL — HIGH (ref 7–23)
CALCIUM SERPL-MCNC: 8.6 MG/DL — SIGNIFICANT CHANGE UP (ref 8.4–10.5)
CALCIUM SERPL-MCNC: 8.7 MG/DL — SIGNIFICANT CHANGE UP (ref 8.4–10.5)
CALCIUM SERPL-MCNC: 9.1 MG/DL — SIGNIFICANT CHANGE UP (ref 8.4–10.5)
CHLORIDE SERPL-SCNC: 104 MMOL/L — SIGNIFICANT CHANGE UP (ref 96–108)
CHLORIDE SERPL-SCNC: 105 MMOL/L — SIGNIFICANT CHANGE UP (ref 96–108)
CHLORIDE SERPL-SCNC: 105 MMOL/L — SIGNIFICANT CHANGE UP (ref 96–108)
CO2 SERPL-SCNC: 20 MMOL/L — LOW (ref 22–31)
CO2 SERPL-SCNC: 25 MMOL/L — SIGNIFICANT CHANGE UP (ref 22–31)
CO2 SERPL-SCNC: 25 MMOL/L — SIGNIFICANT CHANGE UP (ref 22–31)
CREAT SERPL-MCNC: 1.22 MG/DL — SIGNIFICANT CHANGE UP (ref 0.5–1.3)
CREAT SERPL-MCNC: 1.29 MG/DL — SIGNIFICANT CHANGE UP (ref 0.5–1.3)
CREAT SERPL-MCNC: 1.37 MG/DL — HIGH (ref 0.5–1.3)
GLUCOSE SERPL-MCNC: 170 MG/DL — HIGH (ref 70–99)
GLUCOSE SERPL-MCNC: 185 MG/DL — HIGH (ref 70–99)
GLUCOSE SERPL-MCNC: 205 MG/DL — HIGH (ref 70–99)
HAPTOGLOB SERPL-MCNC: 67 MG/DL — SIGNIFICANT CHANGE UP (ref 34–200)
HCT VFR BLD CALC: 32.4 % — LOW (ref 39–50)
HGB BLD-MCNC: 9.8 G/DL — LOW (ref 13–17)
LDH SERPL L TO P-CCNC: 346 U/L — HIGH (ref 50–242)
MAGNESIUM SERPL-MCNC: 2.2 MG/DL — SIGNIFICANT CHANGE UP (ref 1.6–2.6)
MCHC RBC-ENTMCNC: 28.8 PG — SIGNIFICANT CHANGE UP (ref 27–34)
MCHC RBC-ENTMCNC: 30.4 GM/DL — LOW (ref 32–36)
MCV RBC AUTO: 94.9 FL — SIGNIFICANT CHANGE UP (ref 80–100)
PHOSPHATE SERPL-MCNC: 2.9 MG/DL — SIGNIFICANT CHANGE UP (ref 2.5–4.5)
PLATELET # BLD AUTO: 155 K/UL — SIGNIFICANT CHANGE UP (ref 150–400)
POTASSIUM SERPL-MCNC: 5.5 MMOL/L — HIGH (ref 3.5–5.3)
POTASSIUM SERPL-MCNC: 5.5 MMOL/L — HIGH (ref 3.5–5.3)
POTASSIUM SERPL-MCNC: 5.6 MMOL/L — HIGH (ref 3.5–5.3)
POTASSIUM SERPL-MCNC: 5.7 MMOL/L — HIGH (ref 3.5–5.3)
POTASSIUM SERPL-SCNC: 5.5 MMOL/L — HIGH (ref 3.5–5.3)
POTASSIUM SERPL-SCNC: 5.5 MMOL/L — HIGH (ref 3.5–5.3)
POTASSIUM SERPL-SCNC: 5.6 MMOL/L — HIGH (ref 3.5–5.3)
POTASSIUM SERPL-SCNC: 5.7 MMOL/L — HIGH (ref 3.5–5.3)
PROT SERPL-MCNC: 6 G/DL — SIGNIFICANT CHANGE UP (ref 6–8.3)
RBC # BLD: 3.41 M/UL — LOW (ref 4.2–5.8)
RBC # FLD: 20.6 % — HIGH (ref 10.3–14.5)
SODIUM SERPL-SCNC: 138 MMOL/L — SIGNIFICANT CHANGE UP (ref 135–145)
SODIUM SERPL-SCNC: 139 MMOL/L — SIGNIFICANT CHANGE UP (ref 135–145)
SODIUM SERPL-SCNC: 141 MMOL/L — SIGNIFICANT CHANGE UP (ref 135–145)
TACROLIMUS SERPL-MCNC: 18.1 NG/ML — SIGNIFICANT CHANGE UP
WBC # BLD: 9.8 K/UL — SIGNIFICANT CHANGE UP (ref 3.8–10.5)
WBC # FLD AUTO: 9.8 K/UL — SIGNIFICANT CHANGE UP (ref 3.8–10.5)

## 2018-04-23 PROCEDURE — 99233 SBSQ HOSP IP/OBS HIGH 50: CPT

## 2018-04-23 PROCEDURE — 71045 X-RAY EXAM CHEST 1 VIEW: CPT | Mod: 26

## 2018-04-23 PROCEDURE — 93306 TTE W/DOPPLER COMPLETE: CPT | Mod: 26

## 2018-04-23 RX ORDER — INSULIN HUMAN 100 [IU]/ML
10 INJECTION, SOLUTION SUBCUTANEOUS ONCE
Qty: 0 | Refills: 0 | Status: DISCONTINUED | OUTPATIENT
Start: 2018-04-23 | End: 2018-04-23

## 2018-04-23 RX ORDER — TACROLIMUS 5 MG/1
8 CAPSULE ORAL
Qty: 0 | Refills: 0 | Status: DISCONTINUED | OUTPATIENT
Start: 2018-04-24 | End: 2018-04-26

## 2018-04-23 RX ORDER — TACROLIMUS 5 MG/1
7 CAPSULE ORAL ONCE
Qty: 0 | Refills: 0 | Status: COMPLETED | OUTPATIENT
Start: 2018-04-23 | End: 2018-04-23

## 2018-04-23 RX ORDER — INSULIN HUMAN 100 [IU]/ML
10 INJECTION, SOLUTION SUBCUTANEOUS ONCE
Qty: 0 | Refills: 0 | Status: COMPLETED | OUTPATIENT
Start: 2018-04-23 | End: 2018-04-23

## 2018-04-23 RX ORDER — VALGANCICLOVIR 450 MG/1
450 TABLET, FILM COATED ORAL
Qty: 0 | Refills: 0 | Status: DISCONTINUED | OUTPATIENT
Start: 2018-04-23 | End: 2018-04-26

## 2018-04-23 RX ADMIN — TACROLIMUS 9 MILLIGRAM(S): 5 CAPSULE ORAL at 08:17

## 2018-04-23 RX ADMIN — Medication 1 TABLET(S): at 11:59

## 2018-04-23 RX ADMIN — Medication 500000 UNIT(S): at 18:12

## 2018-04-23 RX ADMIN — Medication 650 MILLIGRAM(S): at 21:47

## 2018-04-23 RX ADMIN — MYCOPHENOLATE MOFETIL 1000 MILLIGRAM(S): 250 CAPSULE ORAL at 20:05

## 2018-04-23 RX ADMIN — Medication 500000 UNIT(S): at 06:03

## 2018-04-23 RX ADMIN — Medication 500000 UNIT(S): at 11:59

## 2018-04-23 RX ADMIN — FAMOTIDINE 20 MILLIGRAM(S): 10 INJECTION INTRAVENOUS at 18:12

## 2018-04-23 RX ADMIN — Medication 650 MILLIGRAM(S): at 11:59

## 2018-04-23 RX ADMIN — MAGNESIUM OXIDE 400 MG ORAL TABLET 400 MILLIGRAM(S): 241.3 TABLET ORAL at 08:19

## 2018-04-23 RX ADMIN — Medication 1 APPLICATION(S): at 07:06

## 2018-04-23 RX ADMIN — MYCOPHENOLATE MOFETIL 1000 MILLIGRAM(S): 250 CAPSULE ORAL at 08:18

## 2018-04-23 RX ADMIN — Medication 15 MILLIGRAM(S): at 08:19

## 2018-04-23 RX ADMIN — Medication 12.5 MILLIGRAM(S): at 20:06

## 2018-04-23 RX ADMIN — MAGNESIUM OXIDE 400 MG ORAL TABLET 400 MILLIGRAM(S): 241.3 TABLET ORAL at 20:06

## 2018-04-23 RX ADMIN — INSULIN HUMAN 10 UNIT(S): 100 INJECTION, SOLUTION SUBCUTANEOUS at 03:59

## 2018-04-23 RX ADMIN — Medication 240 MILLIGRAM(S): at 06:03

## 2018-04-23 RX ADMIN — Medication 500000 UNIT(S): at 21:47

## 2018-04-23 RX ADMIN — Medication 500000 UNIT(S): at 00:50

## 2018-04-23 RX ADMIN — VALGANCICLOVIR 450 MILLIGRAM(S): 450 TABLET, FILM COATED ORAL at 20:05

## 2018-04-23 RX ADMIN — Medication 20 MILLIGRAM(S): at 21:47

## 2018-04-23 RX ADMIN — FAMOTIDINE 20 MILLIGRAM(S): 10 INJECTION INTRAVENOUS at 06:03

## 2018-04-23 RX ADMIN — AMLODIPINE BESYLATE 5 MILLIGRAM(S): 2.5 TABLET ORAL at 06:03

## 2018-04-23 RX ADMIN — VALGANCICLOVIR 450 MILLIGRAM(S): 450 TABLET, FILM COATED ORAL at 08:20

## 2018-04-23 RX ADMIN — ATOVAQUONE 1500 MILLIGRAM(S): 750 SUSPENSION ORAL at 11:59

## 2018-04-23 RX ADMIN — Medication 1 APPLICATION(S): at 18:13

## 2018-04-23 RX ADMIN — TACROLIMUS 7 MILLIGRAM(S): 5 CAPSULE ORAL at 20:04

## 2018-04-23 RX ADMIN — Medication 81 MILLIGRAM(S): at 11:59

## 2018-04-23 RX ADMIN — Medication 650 MILLIGRAM(S): at 06:03

## 2018-04-23 NOTE — DIETITIAN INITIAL EVALUATION ADULT. - PROBLEM SELECTOR PLAN 2
Patient takes medications at home at [id number]  discussed with Dr. Eliot Raymundo level at 0815  Tacrolimus [id number]/Cardizem 240mg daily  Prednisone 15mg q 12 may decrease depending on bx  Cellcept 1000mg Q12

## 2018-04-23 NOTE — DIETITIAN INITIAL EVALUATION ADULT. - PERTINENT MEDS FT
Prograf, Cellcept, prednisone, colace, pepcid, calcium+vitamin D, magnesium oxide, multivitamin, sodium bicarbonate.

## 2018-04-23 NOTE — PROGRESS NOTE ADULT - ATTENDING COMMENTS
Currently admitted for treatment of possible AMR.   Most recent biopsy showed no cellular rejection but persistent complement deposition, which has not been treated to date for lack of DSAs.   However last weeks TTE showed decline in systolic function. Patient admitted for empiric plasmaphoresis/IVIG and initiation of ritoxamab.  Now s/p 2 X PF, IVIG.  Fri/Monday. Due for next PF tomorrow.   Meds: Valcyte 450 bid, Mepron, Prograf 9/9 (decreased 10/10 yesterday pm). Prograf 13.3, 16.8, 17.2. 18.1 this am.   Cellcept 1g bid, Pred 15 bid, Novasc 5, Dilt 240, Off Lovanox. ASA  80-90, 110/-115/ afebrile                        9.8    9.8   )-----------( 155      ( 23 Apr 2018 03:01 )             32.4   04-23    x   |  x   |  x   ----------------------------<  x   5.5<H>   |  x   |  x     Ca    9.1      23 Apr 2018 08:42  Phos  2.9     04-23  Mg     2.2     04-23    TPro  6.0  /  Alb  4.3  /  TBili  0.5  /  DBili  x   /  AST  17  /  ALT  25  /  AlkPhos  44  04-23  Na 141, K 5.5, Cl 105, Bi 25, BUN 30, Cr 1.2    TTE: LVEF 57%, normal RV size but decreased function. compared to prior TTE improved LVEF.   Plan:  One further PF/IVIG tomorrow. Then d/c line  Consult hematology for ritoxamab dosing.   Decrease pred 12.5 bid.   Make prograf 7 this evening only. then reduce to 8 bid.   Marvin Campbell

## 2018-04-23 NOTE — PROGRESS NOTE ADULT - SUBJECTIVE AND OBJECTIVE BOX
24H hour events: No acute events overnight. Patient feeling well, sitting in chair.     MEDICATIONS:  amLODIPine   Tablet 5 milliGRAM(s) Oral daily  aspirin enteric coated 81 milliGRAM(s) Oral daily  diltiazem    milliGRAM(s) Oral daily  atovaquone Suspension 1500 milliGRAM(s) Oral daily  nystatin    Suspension 589658 Unit(s) Oral every 6 hours  valGANciclovir 450 milliGRAM(s) Oral <User Schedule>  docusate sodium 100 milliGRAM(s) Oral three times a day PRN  famotidine    Tablet 20 milliGRAM(s) Oral two times a day  pravastatin 20 milliGRAM(s) Oral at bedtime  predniSONE   Tablet 15 milliGRAM(s) Oral <User Schedule>  magnesium oxide 400 milliGRAM(s) Oral <User Schedule>  multivitamin 1 Tablet(s) Oral daily  mycophenolate mofetil 1000 milliGRAM(s) Oral <User Schedule>  silver sulfADIAZINE 1% Cream 1 Application(s) Topical two times a day  sodium bicarbonate 650 milliGRAM(s) Oral three times a day  tacrolimus 9 milliGRAM(s) Oral <User Schedule>      REVIEW OF SYSTEMS:  Complete 10point ROS negative except as documented above.    PHYSICAL EXAM:  T(C): 36.1 (04-23-18 @ 08:00), Max: 36.1 (04-23-18 @ 08:00)  HR: 106 (04-23-18 @ 13:00) (91 - 106)  BP: 115/70 (04-23-18 @ 13:00) (103/69 - 123/86)  RR: 19 (04-23-18 @ 13:00) (15 - 58)  SpO2: 98% (04-23-18 @ 13:00) (98% - 100%)  Wt(kg): --  I&O's Summary    22 Apr 2018 07:01  -  23 Apr 2018 07:00  --------------------------------------------------------  IN: 1075 mL / OUT: 2100 mL / NET: -1025 mL    23 Apr 2018 07:01  -  23 Apr 2018 13:26  --------------------------------------------------------  IN: 0 mL / OUT: 400 mL / NET: -400 mL        Appearance: Normal	  HEENT:   Normal oral mucosa, PERRL, EOMI	  Lymphatic: No lymphadenopathy  Cardiovascular: Normal S1 S2, No JVD, No murmurs, No edema  Respiratory: Lungs clear to auscultation	  Psychiatry: A & O x 3, Mood & affect appropriate  Gastrointestinal:  Soft, Non-tender, + BS	  Skin: No rashes, No ecchymoses, No cyanosis	  Neurologic: Non-focal  Extremities: Normal range of motion, No clubbing, cyanosis or edema  Vascular: Peripheral pulses palpable 2+ bilaterally        LABS:	 	    CBC Full  -  ( 23 Apr 2018 03:01 )  WBC Count : 9.8 K/uL  Hemoglobin : 9.8 g/dL  Hematocrit : 32.4 %  Platelet Count - Automated : 155 K/uL  Mean Cell Volume : 94.9 fl  Mean Cell Hemoglobin : 28.8 pg  Mean Cell Hemoglobin Concentration : 30.4 gm/dL  Auto Neutrophil # : x  Auto Lymphocyte # : x  Auto Monocyte # : x  Auto Eosinophil # : x  Auto Basophil # : x  Auto Neutrophil % : x  Auto Lymphocyte % : x  Auto Monocyte % : x  Auto Eosinophil % : x  Auto Basophil % : x    04-23    x   |  x   |  x   ----------------------------<  x   5.5<H>   |  x   |  x   04-23    141  |  105  |  30<H>  ----------------------------<  170<H>  5.6<H>   |  25  |  1.29    Ca    9.1      23 Apr 2018 08:42  Ca    8.6      23 Apr 2018 03:00  Phos  2.9     04-23  Phos  4.2     04-22  Mg     2.2     04-23  Mg     2.2     04-22    TPro  6.0  /  Alb  4.3  /  TBili  0.5  /  DBili  x   /  AST  17  /  ALT  25  /  AlkPhos  44  04-23  TPro  6.1  /  Alb  3.5  /  TBili  0.4  /  DBili  x   /  AST  19  /  ALT  35  /  AlkPhos  68  04-22      proBNP: Serum Pro-Brain Natriuretic Peptide: 3992 pg/mL (04-22 @ 02:29)    Lipid Profile:   HgA1c:   TSH:       CARDIAC MARKERS:      TELEMETRY: 	    ECG:  	  RADIOLOGY:  OTHER: 	    PREVIOUS DIAGNOSTIC TESTING:    [ ] Echocardiogram:  [ ]  Catheterization:  [ ] Stress Test:  	  	  ASSESSMENT/PLAN: 	    Heath Bonner MD   [id number] 24H hour events: No acute events overnight. Patient feeling well, sitting in chair.     MEDICATIONS:  amLODIPine   Tablet 5 milliGRAM(s) Oral daily  aspirin enteric coated 81 milliGRAM(s) Oral daily  diltiazem    milliGRAM(s) Oral daily  atovaquone Suspension 1500 milliGRAM(s) Oral daily  nystatin    Suspension 349353 Unit(s) Oral every 6 hours  valGANciclovir 450 milliGRAM(s) Oral <User Schedule>  docusate sodium 100 milliGRAM(s) Oral three times a day PRN  famotidine    Tablet 20 milliGRAM(s) Oral two times a day  pravastatin 20 milliGRAM(s) Oral at bedtime  predniSONE   Tablet 15 milliGRAM(s) Oral <User Schedule>  magnesium oxide 400 milliGRAM(s) Oral <User Schedule>  multivitamin 1 Tablet(s) Oral daily  mycophenolate mofetil 1000 milliGRAM(s) Oral <User Schedule>  silver sulfADIAZINE 1% Cream 1 Application(s) Topical two times a day  sodium bicarbonate 650 milliGRAM(s) Oral three times a day  tacrolimus 9 milliGRAM(s) Oral <User Schedule>    REVIEW OF SYSTEMS:  Complete 10point ROS negative except as documented above.    PHYSICAL EXAM:  T(C): 36.1 (04-23-18 @ 08:00), Max: 36.1 (04-23-18 @ 08:00)  HR: 106 (04-23-18 @ 13:00) (91 - 106)  BP: 115/70 (04-23-18 @ 13:00) (103/69 - 123/86)  RR: 19 (04-23-18 @ 13:00) (15 - 58)  SpO2: 98% (04-23-18 @ 13:00) (98% - 100%)  Wt(kg): --  I&O's Summary    22 Apr 2018 07:01  -  23 Apr 2018 07:00  --------------------------------------------------------  IN: 1075 mL / OUT: 2100 mL / NET: -1025 mL    23 Apr 2018 07:01  -  23 Apr 2018 13:26  --------------------------------------------------------  IN: 0 mL / OUT: 400 mL / NET: -400 mL    Appearance: Normal	  HEENT:   Normal oral mucosa, PERRL, EOMI	  Lymphatic: No lymphadenopathy  Cardiovascular: Normal S1 S2, No JVD, No murmurs, No edema  Respiratory: Lungs clear to auscultation	  Psychiatry: A & O x 3, Mood & affect appropriate  Gastrointestinal:  Soft, Non-tender, + BS	  Skin: No rashes, No ecchymoses, No cyanosis	  Neurologic: Non-focal  Extremities: Normal range of motion, No clubbing, cyanosis or edema  Vascular: Peripheral pulses palpable 2+ bilaterally    LABS:	 	    CBC Full  -  ( 23 Apr 2018 03:01 )  WBC Count : 9.8 K/uL  Hemoglobin : 9.8 g/dL  Hematocrit : 32.4 %  Platelet Count - Automated : 155 K/uL  Mean Cell Volume : 94.9 fl  Mean Cell Hemoglobin : 28.8 pg  Mean Cell Hemoglobin Concentration : 30.4 gm/dL  Auto Neutrophil # : x  Auto Lymphocyte # : x  Auto Monocyte # : x  Auto Eosinophil # : x  Auto Basophil # : x  Auto Neutrophil % : x  Auto Lymphocyte % : x  Auto Monocyte % : x  Auto Eosinophil % : x  Auto Basophil % : x    04-23    x   |  x   |  x   ----------------------------<  x   5.5<H>   |  x   |  x   04-23    141  |  105  |  30<H>  ----------------------------<  170<H>  5.6<H>   |  25  |  1.29    Ca    9.1      23 Apr 2018 08:42  Ca    8.6      23 Apr 2018 03:00  Phos  2.9     04-23  Phos  4.2     04-22  Mg     2.2     04-23  Mg     2.2     04-22    TPro  6.0  /  Alb  4.3  /  TBili  0.5  /  DBili  x   /  AST  17  /  ALT  25  /  AlkPhos  44  04-23  TPro  6.1  /  Alb  3.5  /  TBili  0.4  /  DBili  x   /  AST  19  /  ALT  35  /  AlkPhos  68  04-22      proBNP: Serum Pro-Brain Natriuretic Peptide: 3992 pg/mL (04-22 @ 02:29)      CARDIAC MARKERS:    TELEMETRY: 	      PREVIOUS DIAGNOSTIC TESTING:    [ ] Echocardiogram:    [ ]  Catheterization:  [ ] Stress Test:  	  	  ASSESSMENT/PLAN: 	    Heath Bonner MD   [id number]

## 2018-04-23 NOTE — DIETITIAN INITIAL EVALUATION ADULT. - NS AS NUTRI INTERV ED CONTENT
Reviewed potassium content of foods, foods to limit and foods to avoid. Also reviewed heart healthy diet guidelines, food/drug interaction, and food safety. Pt aware of relationship between diet and health/disease and states he has been adhering to restrictions at home.

## 2018-04-23 NOTE — PROGRESS NOTE ADULT - SUBJECTIVE AND OBJECTIVE BOX
CHIEF COMPLAINT: SOB     HPI:  67M with ACC/AHA stage D chronic systolic heart failure due to NICM s/p HM2 LVAD  c/b pump thrombosis now s/p heart transplant on  (CMV D-/R+ and Toxo D-/R+), with post-op course c/b primary graft dysfunction, initially thought to be immune-mediated due to positive B-cell flow (negative CDC cross match) and received plasmapharesis/IVIg x2 with improvement in LV function. Post-transplant course has been complicated by bilateral IJ/subclavian thrombi and he has a persistent small  right sided pleural effusion as well as chronic renal failure, thought to be related to Prograf.  Echo on 4/3 showed evidence of slight worsened LV strain, LVEF of 52%, and slightly diminished right ventricular systolic function.  RHC on  showed elevated biventricular filling pressures with normal cardiac output. Biopsy showed 1R/1A cellular rejection. C4D continues to be diffusely positive. DSA is unremarkable.    He continued to make progress and was discharged to home on 4/10, re-admitted to the hospital for RHC for cardiac biopsy and was found to have potassium of 6 and progressive rejection.    PAST MEDICAL & SURGICAL HISTORY:  DVT of upper extremity (deep vein thrombosis)  Hepatitis C virus  GIB (gastrointestinal bleeding)  Ventricular fibrillation: s/p AICD  PAF (paroxysmal atrial fibrillation): on xarelto  Non-Ischemic Cardiomyopathy  SVT (Supraventricular Tachycardia)  HTN  CHF (Congestive Heart Failure)  H/O heart transplant: 2018  LVAD (left ventricular assist device) present  Status post left hip replacement  History of Prior Ablation Treatment: for afib  AICD (Automatic Cardioverter/Defibrillator) Present: [employer] with 1 [employer] lead09- explanted and replaced with [employer] 2 leads on 09      FAMILY HISTORY:  No pertinent family history in first degree relatives    Social History:    Marital Status:  X    (   ) Single    (   )    (  )   Occupation: Retired   Lives with: (  ) alone  (  ) children  X spouse   (  ) parents  (  ) other    Substance Use (street drugs): X never used  (  ) other:  Tobacco Usage: X never smoked   (   ) former smoker   (   ) current smoker  (     ) pack year  (    ) last cigarette date  Alcohol Usage: Social      OBJECTIVE:  Vital Signs Last 24 Hrs  T(C): 36 (2018 00:00), Max: 37.3 (2018 15:00)  T(F): 96.8 (2018 00:00), Max: 99.1 (2018 15:00)  HR: 101 (2018 11:00) (92 - 114)  BP: 117/77 (2018 11:00) (93/74 - 127/91)  BP(mean): 90 (2018 11:00) (76 - 106)  RR: 48 (2018 11:00) (10 - 51)  SpO2: 100% (2018 11:00) (99% - 100%)       @ 07:  -   @ 07:00  --------------------------------------------------------  IN: 850 mL / OUT: 1700 mL / NET: -850 mL     @ 07: @ 11:28  --------------------------------------------------------  IN: 0 mL / OUT: 200 mL / NET: -200 mL      HOSPITAL MEDICATIONS:  amLODIPine   Tablet 5 milliGRAM(s) Oral daily  aspirin enteric coated 81 milliGRAM(s) Oral daily  atovaquone Suspension 1500 milliGRAM(s) Oral daily  calcium citrate with vit D 315mg/250unit 2 Tablet(s) 2 Tablet(s) Oral two times a day  diltiazem    milliGRAM(s) Oral daily  docusate sodium 100 milliGRAM(s) Oral three times a day PRN  famotidine    Tablet 20 milliGRAM(s) Oral two times a day  magnesium oxide 400 milliGRAM(s) Oral <User Schedule>  multivitamin 1 Tablet(s) Oral daily  mycophenolate mofetil 1000 milliGRAM(s) Oral <User Schedule>  nystatin    Suspension 922685 Unit(s) Oral every 6 hours  pravastatin 20 milliGRAM(s) Oral at bedtime  predniSONE   Tablet 15 milliGRAM(s) Oral <User Schedule>  silver sulfADIAZINE 1% Cream 1 Application(s) Topical two times a day  sodium bicarbonate 650 milliGRAM(s) Oral three times a day  tacrolimus 10 milliGRAM(s) Oral <User Schedule>  valGANciclovir 450 milliGRAM(s) Oral <User Schedule>    No Known Allergies    CONSTITUTIONAL: No fever, weight loss, or fatigue  EYES: No eye pain, visual disturbances, or discharge  ENMT:  No difficulty hearing, tinnitus, vertigo; No sinus or throat pain  NECK: No pain or stiffness  BREASTS: No pain, masses, or nipple discharge  RESPIRATORY: No cough, wheezing, chills or hemoptysis; No shortness of breath  CARDIOVASCULAR: No chest pain, palpitations, dizziness, or leg swelling  GASTROINTESTINAL: No abdominal or epigastric pain. No nausea, vomiting, or hematemesis; No diarrhea or constipation. No melena or hematochezia.  GENITOURINARY: No dysuria, frequency, hematuria, or incontinence  NEUROLOGICAL: No headaches, memory loss, loss of strength, numbness, or tremors  SKIN: No itching, burning, rashes, or lesions   LYMPH NODES: No enlarged glands  ENDOCRINE: No heat or cold intolerance; No hair loss  MUSCULOSKELETAL: No joint pain or swelling; No muscle, back, or extremity pain  PSYCHIATRIC: No depression, anxiety, mood swings, or difficulty sleeping  HEME/LYMPH: No easy bruising, or bleeding gums  ALLERY AND IMMUNOLOGIC: No hives or eczema      PHYSICAL EXAM:Daily     Daily Weight in k.1 (2018 00:00)  HEENT:     + NCAT  + EOMI  - Conjunctival edema   - Icterus   - Thrush   - ETT  - NGT/OGT  Neck:         + FROM    + JVD     - Nodes     - Masses    + Mid-line trachea   - Tracheostomy  Chest:         - Sternal click  - Sternal drainage  - Pacing wires  - Chest tubes  - SubQ emphysema  Lungs:          + CTA   - Rhonchi    - Rales    - Wheezing     - Decreased BS   - Dullness R L  Cardiac:       + S1 + S2    + RRR   - Irregular   - S3  - S4    - Murmurs   - Rub   - Hamman’s sign   Abdomen:    + BS     + Soft    + Non-tender     - Distended    - Organomegaly  - PEG  Extremities:   - Cyanosis U/L   - Clubbing  U/L  - LE/UE Edema   + Capillary refill    + Pulses   Neuro:        + Awake   +  Alert   - Confused   - Lethargic   - Sedated   - Generalized Weakness  Skin:        - Rashes    - Erythema   + Normal incisions   + IV sites intact  - Sacral decubitus    LABS:                        10.3   9.8   )-----------( 223      ( 2018 02:29 )             33.1         138  |  102  |  40<H>  ----------------------------<  212<H>  5.3   |  26  |  1.51<H>    Ca    8.5      2018 02:29  Phos  4.2       Mg     2.2         TPro  6.1  /  Alb  3.5  /  TBili  0.4  /  DBili  x   /  AST  19  /  ALT  35  /  AlkPhos  68      PT/INR - ( 2018 18:25 )   PT: 11.4 sec;   INR: 1.05 ratio         PTT - ( 2018 18:25 )  PTT:25.3 sec  LIVER FUNCTIONS - ( 2018 02:29 )  Alb: 3.5 g/dL / Pro: 6.1 g/dL / ALK PHOS: 68 U/L / ALT: 35 U/L / AST: 19 U/L / GGT: x            RADIOLOGY:  X Reviewed and interpreted by me    Assessment: 1. S/P Heart transplant with acute rejection    Plan:    Patient receiving plasmapheresis with albumin based regimen instead of FFP ASA continued for graft occlusion-thromboembolism prophylaxis  Pepcid maintained for GI bleeding prophylaxis  Immunosuppression as per Heart Failure  Reviewed & addressed medication regimen

## 2018-04-23 NOTE — PROGRESS NOTE ADULT - ASSESSMENT
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 Found to have b/l IJ/subclavian thrombi as well at that time and was treated with ongoing AC. 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    OHT 2/2018  # s/p OHT now with graft dysfunction - He had his PP/IVIg s/p 2 treatments with the 3rd treatment planned for tomorrow. His TTE today shows improved LVEF to 57% today suggesting his initial issue was graft dysfunction. Tacro level continues to be elevated.   -reduce tacrolimus to to 7 mg tonight and then 8 mg BID with food tomorrow  -Steroids - please decrease dose to 12.5  mg po BID   -CellCept - continue 1000 mg PO BID.  -plan for IVIG # 3 treatment tomorrow.  Plan to sarai therapy with rituximab, will coordinate with hematology  	  # Antimicrobial prophylaxis:  Intermediate risk CMV - increase Valcyte to 450 mg BID. Routine CMV PCR testing was negative on 4/19.  Intermediate risk Toxo - continue Mepron 1500mg PO daily with food.  PCP - continue Mepron 1500mg PO daily  Thrush - continue Nystatin S/S 5 mL QID until prednisone < 10 mg po BID.    # Additional treatments:  - Citracal + D 2 tabs PO BID  - multivitamin 1 tab daily  - Protonix 40 mg po daily for stress ulcer prophylaxis while on steroids    # Bilateral IJ/Subclavian thrombi -   - Will hold AC until he completes plasmapheresis as it depletes clotting factors. He was supposed to complete a 3 month course for UE DVT.  - CHERIE negative on last admission    # CAV PPx  - Continue ECASA 81mg PO daily  - Continue pravastatin 20mg PO QHS    # Hypertension  - He is currently on cardizem and norvasc for his BP control. Given the possibility that this could be non-HLA rejection, it would be ideal to use losartan instead of norvasc for BP control if his potassium level is under better control.  Continue current therapy for now.     # ID   - DLES improved and wound vac in place. Dressing changes per protocol of CTU. Off antibiotics.  - Right 3rd digit ulceration improving. Review if silver sulfadiazine dressings still required by plastics.   - HCV viral load by PCR 4/19 was 42,855. Therapy to be initiated by Dr. Thomas (Hepatology) after discharge.  - Transplant ID following    # endo  - SSI AC & HS    # Hyperkalemia - He received one dose of Kayexalate and K is now stable. No further treatment needed at this time. Low K diet, and will press forward wiwth adjust ment of tacrolimus.     Please call me at [phone] for any further questions up till 5 pm. For after hours, please call the covering cardiology [place] call fellow [place] [phone] for any further assistance.

## 2018-04-23 NOTE — DIETITIAN INITIAL EVALUATION ADULT. - ENERGY NEEDS
Ht:5ft8in, Wt:156.5pounds (adm), BMI:23.9,   IBW:154pounds (+/-10%), 102%IBW  Pertinent Information: Pt s/p heart transplant on 2/23/18 and discharged on 4/10/18; came in for right heart catheterization and biopsy on 4/19/18, found with hyperkalemia and graft dysfunction, now s/p 2 treatments of IVIg as per chart. Pt with 1+right middle finger edema; no pressure ulcers noted.

## 2018-04-23 NOTE — DIETITIAN INITIAL EVALUATION ADULT. - PERTINENT LABORATORY DATA
()Na:138, K:5.7-high, Cl:104, BUN:32-high, Cr:1.22, Glucose:185-high, Ca:8.6, Total Prot:6, Albuimin:4.3, Total Bilirubin:0.5, AlkPhos:44, AST:17, ALT:25, M.2, LDH:346-high, Phos:2.9, () Hgb/Hct:10.3/33.1-low.

## 2018-04-23 NOTE — DIETITIAN INITIAL EVALUATION ADULT. - ORAL INTAKE PTA
fair/Pt reports eating 2 meals/day PTA which were prepared by his brother. Pt has a home health aide for 10 hours/day, however aide does not cook.

## 2018-04-23 NOTE — DIETITIAN INITIAL EVALUATION ADULT. - OTHER INFO
Pt last seen by this RD on 4/9, which was day prior to discharge (4/10) from last admission. At that time pt had weighed 74.5kg. Noted this admission weight 71kg and current weight 70.5kg. This reflects 4kg (8.8pound, 5%) weight loss in 2 weeks. Pt admitted to decreased PO intake at home compared to previous admission. However, states now he is eating more (>% of meals). Denies GI distress and reports no difficulty chewing/swallowing. No known food allergies or intolerances reported. Supplementation PTA consisted of magnesium oxide, calcium with vitamin D, and a multivitamin.

## 2018-04-23 NOTE — PROGRESS NOTE ADULT - SUBJECTIVE AND OBJECTIVE BOX
Behavioral Cardiology Progress Note     HPI:  Mr. Baez is a 67 year-old man, , only son . Owns house he lives in with his brother.  ACC/AHA stage D chronic systolic heart failure due to NICM (LVEF 20%) s/p HM2 LVAD  c/b pump thrombus now s/p OHT  (CMV D-/R+ and Toxo D-/R+), with post-op course c/b primary graft dysfunction, initially thought to be immune-mediated due to positive B-cell flow (negative CDC cross match) and received plasmapharesis/IVIg x2 with improvement in LV function. Found to have b/l IJ/subclavian thrombi as well at that time and was treated with ongoing AC. 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  No PPH.     Current stressors:   Hospitalization   s/p Heart transplant (18)      Support system/family support: Good support from family and close friend      Coping strategies: Talking with family and staff, watching TV, his pastora, talking to his granddaughter     Understanding of medical illness and treatment plan:   Ongoing education on medication management provided by HT coordinator and other team members. Strategies being utilized to accommodate limited literacy (pictures of medication, frequent teach-back).  Good understanding of need for lifelong immunosuppressant medication, importance of calling HT coordinator.  Benefits from frequent review and teach-back of all education.     MSE:  Seen lying in bed. A&Ox3.  Well-related with variant eye contact; tearful. Thought process goal directed. No evidence of any psychosis, delusions, jona. Mood "a little depressed." Affect constricted. Insight and judgment adequate.

## 2018-04-23 NOTE — DIETITIAN INITIAL EVALUATION ADULT. - ADHERENCE
Breakfast is cheerios with whole milk, eggs, and grits. Lunch is a turkey burger. Pt states he often skips dinner, but will snack on ice cream or cake. Pt able to teach-back food safety for transplant recipients, as well as food/drug interactions, and heart healthy diet./good

## 2018-04-23 NOTE — CHART NOTE - NSCHARTNOTEFT_GEN_A_CORE
Upon Nutritional Assessment by the Registered Dietitian your patient was determined to meet criteria / has evidence of the following diagnosis/diagnoses:          [ ]  Mild Protein Calorie Malnutrition        [ x ]  Moderate Protein Calorie Malnutrition        [ ] Severe Protein Calorie Malnutrition        [ ] Unspecified Protein Calorie Malnutrition        [ ] Underweight / BMI <19        [ ] Morbid Obesity / BMI > 40      Findings as based on:  [ x ] Comprehensive nutrition assessment   [ x ] Nutrition Focused Physical Exam - mild muscle and fat loss  [ ] Other:       Nutrition Plan/Recommendations:    1. Encourage PO intake with all meals.   2. Provide food preferences within therapeutic diet when requested.   3. Reviewed alternate menu options and menu ordering procedure.   4. Recommend change diet to regular, low fiber, no concentrated potassium, with Nepro 1 can/day.      PROVIDER Section:     By signing this assessment you are acknowledging and agree with the diagnosis/diagnoses assigned by the Registered Dietitian    Comments:

## 2018-04-23 NOTE — DIETITIAN INITIAL EVALUATION ADULT. - NS AS NUTRI INTERV MEALS SNACK
Recommend liberalize diet to regular, low fiber, no concentrated potassium. Encourage PO intake with all meals. Provide food preferences within therapeutic diet when requested. Reviewed alternate menu options and menu ordering procedure.

## 2018-04-23 NOTE — DIETITIAN INITIAL EVALUATION ADULT. - SOURCE
Comprehensive chart review, pt discussed during cardiothoracic surgery rounds/patient/other (specify)

## 2018-04-23 NOTE — DIETITIAN INITIAL EVALUATION ADULT. - PHYSICAL APPEARANCE
Pt agreeable to nutrition focused physical exam. Pt found with mild muscle loss at temples, interosseous region, and thigh; mild fat loss at orbital region, ribs, and triceps./well nourished

## 2018-04-23 NOTE — PROGRESS NOTE ADULT - ASSESSMENT
Patient appeared tearful, remarking that he believes that staff is keeping information from him regarding the condition of his physical health.  Remarked, "I want to know what's going on" and does not understand why he is being kept in the hospital if he is subjectively feeling well.  Reportedly ambulating 1 lap per day, with some SOB.  Coping by watching TV and having his brother visit.  Sleeping and eating well.  Denied having pain.  Supportive therapy provided.        Dx: Adjustment disorder. F43    Recommendations:   -Would benefit from frequent review and teach-back of all education and communication by staff, to accommodate limited literacy.  -Behavioral Cardiology will continue to follow as needed.    Rosio Dacosta M.A.  Psychology Extern

## 2018-04-23 NOTE — PROVIDER CONTACT NOTE (OTHER) - ACTION/TREATMENT ORDERED:
OPAL Hathaway aware of K 5.7. 10 units regular insulin IVP to be given stat. will continue to monitor.

## 2018-04-24 ENCOUNTER — APPOINTMENT (OUTPATIENT)
Dept: CARDIOLOGY | Facility: CLINIC | Age: 68
End: 2018-04-24

## 2018-04-24 LAB
ALBUMIN SERPL ELPH-MCNC: 3.8 G/DL — SIGNIFICANT CHANGE UP (ref 3.3–5)
ALP SERPL-CCNC: 52 U/L — SIGNIFICANT CHANGE UP (ref 40–120)
ALT FLD-CCNC: 25 U/L — SIGNIFICANT CHANGE UP (ref 10–45)
ANION GAP SERPL CALC-SCNC: 11 MMOL/L — SIGNIFICANT CHANGE UP (ref 5–17)
AST SERPL-CCNC: 20 U/L — SIGNIFICANT CHANGE UP (ref 10–40)
BILIRUB SERPL-MCNC: 0.4 MG/DL — SIGNIFICANT CHANGE UP (ref 0.2–1.2)
BUN SERPL-MCNC: 37 MG/DL — HIGH (ref 7–23)
CALCIUM SERPL-MCNC: 8.9 MG/DL — SIGNIFICANT CHANGE UP (ref 8.4–10.5)
CHLORIDE SERPL-SCNC: 104 MMOL/L — SIGNIFICANT CHANGE UP (ref 96–108)
CO2 SERPL-SCNC: 23 MMOL/L — SIGNIFICANT CHANGE UP (ref 22–31)
CREAT SERPL-MCNC: 1.48 MG/DL — HIGH (ref 0.5–1.3)
GLUCOSE SERPL-MCNC: 204 MG/DL — HIGH (ref 70–99)
HCT VFR BLD CALC: 29.8 % — LOW (ref 39–50)
HGB BLD-MCNC: 9.2 G/DL — LOW (ref 13–17)
MCHC RBC-ENTMCNC: 29.3 PG — SIGNIFICANT CHANGE UP (ref 27–34)
MCHC RBC-ENTMCNC: 30.9 GM/DL — LOW (ref 32–36)
MCV RBC AUTO: 94.6 FL — SIGNIFICANT CHANGE UP (ref 80–100)
PLATELET # BLD AUTO: 190 K/UL — SIGNIFICANT CHANGE UP (ref 150–400)
POTASSIUM SERPL-MCNC: 5.6 MMOL/L — HIGH (ref 3.5–5.3)
POTASSIUM SERPL-SCNC: 5.6 MMOL/L — HIGH (ref 3.5–5.3)
PROT SERPL-MCNC: 5.6 G/DL — LOW (ref 6–8.3)
RBC # BLD: 3.15 M/UL — LOW (ref 4.2–5.8)
RBC # FLD: 20.3 % — HIGH (ref 10.3–14.5)
SODIUM SERPL-SCNC: 138 MMOL/L — SIGNIFICANT CHANGE UP (ref 135–145)
TACROLIMUS SERPL-MCNC: 15.2 NG/ML — SIGNIFICANT CHANGE UP
WBC # BLD: 11.1 K/UL — HIGH (ref 3.8–10.5)
WBC # FLD AUTO: 11.1 K/UL — HIGH (ref 3.8–10.5)

## 2018-04-24 PROCEDURE — 99233 SBSQ HOSP IP/OBS HIGH 50: CPT

## 2018-04-24 PROCEDURE — 90834 PSYTX W PT 45 MINUTES: CPT

## 2018-04-24 PROCEDURE — 99232 SBSQ HOSP IP/OBS MODERATE 35: CPT | Mod: 25

## 2018-04-24 PROCEDURE — 36514 APHERESIS PLASMA: CPT

## 2018-04-24 RX ORDER — ACETAMINOPHEN 500 MG
650 TABLET ORAL ONCE
Qty: 0 | Refills: 0 | Status: COMPLETED | OUTPATIENT
Start: 2018-04-24 | End: 2018-04-24

## 2018-04-24 RX ORDER — IMMUNE GLOBULIN,GAMMA(IGG) 5 %
10 VIAL (ML) INTRAVENOUS ONCE
Qty: 0 | Refills: 0 | Status: COMPLETED | OUTPATIENT
Start: 2018-04-24 | End: 2018-04-24

## 2018-04-24 RX ORDER — DIPHENHYDRAMINE HCL 50 MG
50 CAPSULE ORAL ONCE
Qty: 0 | Refills: 0 | Status: COMPLETED | OUTPATIENT
Start: 2018-04-24 | End: 2018-04-24

## 2018-04-24 RX ADMIN — Medication 500000 UNIT(S): at 06:19

## 2018-04-24 RX ADMIN — Medication 12.5 MILLIGRAM(S): at 08:29

## 2018-04-24 RX ADMIN — TACROLIMUS 8 MILLIGRAM(S): 5 CAPSULE ORAL at 08:28

## 2018-04-24 RX ADMIN — FAMOTIDINE 20 MILLIGRAM(S): 10 INJECTION INTRAVENOUS at 06:19

## 2018-04-24 RX ADMIN — TACROLIMUS 8 MILLIGRAM(S): 5 CAPSULE ORAL at 20:07

## 2018-04-24 RX ADMIN — Medication 12.5 MILLIGRAM(S): at 20:09

## 2018-04-24 RX ADMIN — Medication 240 MILLIGRAM(S): at 06:19

## 2018-04-24 RX ADMIN — Medication 650 MILLIGRAM(S): at 21:19

## 2018-04-24 RX ADMIN — ATOVAQUONE 1500 MILLIGRAM(S): 750 SUSPENSION ORAL at 11:43

## 2018-04-24 RX ADMIN — VALGANCICLOVIR 450 MILLIGRAM(S): 450 TABLET, FILM COATED ORAL at 08:28

## 2018-04-24 RX ADMIN — Medication 1 APPLICATION(S): at 07:04

## 2018-04-24 RX ADMIN — Medication 50 MILLIGRAM(S): at 15:32

## 2018-04-24 RX ADMIN — Medication 81 MILLIGRAM(S): at 11:44

## 2018-04-24 RX ADMIN — Medication 1 APPLICATION(S): at 17:44

## 2018-04-24 RX ADMIN — VALGANCICLOVIR 450 MILLIGRAM(S): 450 TABLET, FILM COATED ORAL at 20:07

## 2018-04-24 RX ADMIN — Medication 500000 UNIT(S): at 11:44

## 2018-04-24 RX ADMIN — Medication 650 MILLIGRAM(S): at 15:33

## 2018-04-24 RX ADMIN — MYCOPHENOLATE MOFETIL 1000 MILLIGRAM(S): 250 CAPSULE ORAL at 20:11

## 2018-04-24 RX ADMIN — MAGNESIUM OXIDE 400 MG ORAL TABLET 400 MILLIGRAM(S): 241.3 TABLET ORAL at 21:18

## 2018-04-24 RX ADMIN — Medication 33.33 GRAM(S): at 15:51

## 2018-04-24 RX ADMIN — Medication 500000 UNIT(S): at 17:41

## 2018-04-24 RX ADMIN — AMLODIPINE BESYLATE 5 MILLIGRAM(S): 2.5 TABLET ORAL at 06:19

## 2018-04-24 RX ADMIN — FAMOTIDINE 20 MILLIGRAM(S): 10 INJECTION INTRAVENOUS at 17:41

## 2018-04-24 RX ADMIN — MYCOPHENOLATE MOFETIL 1000 MILLIGRAM(S): 250 CAPSULE ORAL at 08:29

## 2018-04-24 RX ADMIN — MAGNESIUM OXIDE 400 MG ORAL TABLET 400 MILLIGRAM(S): 241.3 TABLET ORAL at 08:29

## 2018-04-24 RX ADMIN — Medication 1 TABLET(S): at 11:44

## 2018-04-24 RX ADMIN — Medication 20 MILLIGRAM(S): at 21:18

## 2018-04-24 RX ADMIN — Medication 650 MILLIGRAM(S): at 06:20

## 2018-04-24 RX ADMIN — Medication 650 MILLIGRAM(S): at 13:33

## 2018-04-24 NOTE — PROGRESS NOTE ADULT - SUBJECTIVE AND OBJECTIVE BOX
24H hour events: No acute events overnight. Doing well. Awaiting 3rd IVIg.     MEDICATIONS:  amLODIPine   Tablet 5 milliGRAM(s) Oral daily  aspirin enteric coated 81 milliGRAM(s) Oral daily  diltiazem    milliGRAM(s) Oral daily  atovaquone Suspension 1500 milliGRAM(s) Oral daily  nystatin    Suspension 963075 Unit(s) Oral every 6 hours  valGANciclovir 450 milliGRAM(s) Oral <User Schedule>  docusate sodium 100 milliGRAM(s) Oral three times a day PRN  famotidine    Tablet 20 milliGRAM(s) Oral two times a day  pravastatin 20 milliGRAM(s) Oral at bedtime  predniSONE   Tablet 12.5 milliGRAM(s) Oral <User Schedule>  magnesium oxide 400 milliGRAM(s) Oral <User Schedule>  multivitamin 1 Tablet(s) Oral daily  mycophenolate mofetil 1000 milliGRAM(s) Oral <User Schedule>  silver sulfADIAZINE 1% Cream 1 Application(s) Topical two times a day  sodium bicarbonate 650 milliGRAM(s) Oral three times a day  tacrolimus 8 milliGRAM(s) Oral <User Schedule>    REVIEW OF SYSTEMS:  Complete 10point ROS negative except as documented above.    PHYSICAL EXAM:  T(C): 35.7 (04-24-18 @ 08:00), Max: 36.7 (04-24-18 @ 00:00)  HR: 111 (04-24-18 @ 09:00) (91 - 111)  BP: 135/88 (04-24-18 @ 09:00) (100/71 - 135/88)  RR: 16 (04-24-18 @ 09:00) (14 - 36)  SpO2: 100% (04-24-18 @ 09:00) (97% - 100%)  Wt(kg): --  I&O's Summary    23 Apr 2018 07:01  -  24 Apr 2018 07:00  --------------------------------------------------------  IN: 0 mL / OUT: 1550 mL / NET: -1550 mL    24 Apr 2018 07:01  -  24 Apr 2018 10:29  --------------------------------------------------------  IN: 360 mL / OUT: 150 mL / NET: 210 mL    Appearance: Normal	  HEENT:   Normal oral mucosa, PERRL, EOMI	  Lymphatic: No lymphadenopathy  Cardiovascular: Normal S1 S2, No JVD, No murmurs, No edema. Left IJ catheter,   Respiratory: Lungs clear to auscultation	  Psychiatry: A & O x 3, Mood & affect appropriate  Gastrointestinal:  Soft, Non-tender, + BS	  Skin: No rashes, No ecchymoses, No cyanosis	  Neurologic: Non-focal  Extremities: Normal range of motion, No clubbing, cyanosis or edema  Vascular: Peripheral pulses palpable 2+ bilaterally    LABS:	 	    CBC Full  -  ( 24 Apr 2018 01:09 )  WBC Count : 11.1 K/uL  Hemoglobin : 9.2 g/dL  Hematocrit : 29.8 %  Platelet Count - Automated : 190 K/uL  Mean Cell Volume : 94.6 fl  Mean Cell Hemoglobin : 29.3 pg  Mean Cell Hemoglobin Concentration : 30.9 gm/dL  Auto Neutrophil # : x  Auto Lymphocyte # : x  Auto Monocyte # : x  Auto Eosinophil # : x  Auto Basophil # : x  Auto Neutrophil % : x  Auto Lymphocyte % : x  Auto Monocyte % : x  Auto Eosinophil % : x  Auto Basophil % : x    04-24    138  |  104  |  37<H>  ----------------------------<  204<H>  5.6<H>   |  23  |  1.48<H>  04-23    139  |  105  |  33<H>  ----------------------------<  205<H>  5.5<H>   |  25  |  1.37<H>    Ca    8.9      24 Apr 2018 01:09  Ca    8.7      23 Apr 2018 16:26  Phos  2.9     04-23  Mg     2.2     04-23    TPro  5.6<L>  /  Alb  3.8  /  TBili  0.4  /  DBili  x   /  AST  20  /  ALT  25  /  AlkPhos  52  04-24  TPro  6.0  /  Alb  4.3  /  TBili  0.5  /  DBili  x   /  AST  17  /  ALT  25  /  AlkPhos  44  04-23    proBNP: Serum Pro-Brain Natriuretic Peptide: 3992 pg/mL (04-22 @ 02:29)    TELEMETRY: 	    ECG:  	  RADIOLOGY:  OTHER: 	    PREVIOUS DIAGNOSTIC TESTING:    [ ] Echocardiogram:  < from: Transthoracic Echocardiogram (04.23.18 @ 08:33) >  Conclusions:  1. Enlarged left atrium due to cardiac transplant.  LA  volume index = 68 cc/m2.  2. Preserved  left ventricular systolic function. Septal  motion consistent with prior cardiac surgery.  3. Normal right ventricular size with decreased right  ventricular systolic function. TAPSE 1.3cm  *** Compared with echocardiogram of 4/19/2018, EF has  improved.    < end of copied text >    [ ]  Catheterization:  [ ] Stress Test:  	  	  ASSESSMENT/PLAN: 	    Heath Bonner MD   [id number]

## 2018-04-24 NOTE — PROGRESS NOTE ADULT - ASSESSMENT
Endorsing periods of low mood, sadness related to hospitalization, worries about his health, sad his granddaughter didn't visit him on his birthday last week. Also down because "staff don't come talk to me anymore."  Open to discussing his feelings and receptive to support and validation. Appetite good. Sleeping well. Ambulating without distress.     Dx: Adjustment disorder. F43    Recommendations:   Benefits from frequent review and teach-back of all education to accommodate limited literacy.  Behavioral Cardiology will continue to follow as needed. DISPLAY PLAN FREE TEXT

## 2018-04-24 NOTE — PROGRESS NOTE ADULT - ATTENDING COMMENTS
no events overnight.   awaiting PF/IVIG today  meds: Prednisone 12.5 bid, Prograf 8 bid (7 one dose last night). Prograf level 15.2, Cellcept 1g bid, Valcyte 450 bid, Mepron 1500 QD. Amlod 5, Dilt 240,  afebrile, 100SR, 110/-135/   I/O -1L  04-24    138  |  104  |  37<H>  ----------------------------<  204<H>  5.6<H>   |  23  |  1.48<H>    Ca    8.9      24 Apr 2018 01:09  Phos  2.9     04-23  Mg     2.2     04-23    TPro  5.6<L>  /  Alb  3.8  /  TBili  0.4  /  DBili  x   /  AST  20  /  ALT  25  /  AlkPhos  52  04-24                        9.2    11.1  )-----------( 190      ( 24 Apr 2018 01:09 )              29.8     Last PF/IVIG today. d/c Vascath.   Reinitiate lovanox tomorrow morning.   For Ritoxan today/tomorrow. d/c cellcept when ritoxan initiated.   Follow prograf level on current dose.  Marvin Campbell no events overnight.   awaiting PF/IVIG today  meds: Prednisone 12.5 bid, Prograf 8 bid (7 one dose last night). Prograf level 15.2, Cellcept 1g bid, Valcyte 450 bid, Mepron 1500 QD. Amlod 5, Dilt 240,  afebrile, 100SR, 110/-135/   exam unremarkable  I/O -1L  04-24    138  |  104  |  37<H>  ----------------------------<  204<H>  5.6<H>   |  23  |  1.48<H>    Ca    8.9      24 Apr 2018 01:09  Phos  2.9     04-23  Mg     2.2     04-23    TPro  5.6<L>  /  Alb  3.8  /  TBili  0.4  /  DBili  x   /  AST  20  /  ALT  25  /  AlkPhos  52  04-24                        9.2    11.1  )-----------( 190      ( 24 Apr 2018 01:09 )              29.8     Last PF/IVIG today. d/c Vascath.   Reinitiate lovanox tomorrow morning.   For Ritoxan today/tomorrow. d/c cellcept when ritoxan initiated.   Follow prograf level on current dose.  Marvin Campbell

## 2018-04-24 NOTE — PROGRESS NOTE ADULT - ASSESSMENT
68 y/o M s/p heart transplant on 2/23/2018, who had graft dysfunction thought to represent antibody-mediated rejection (AMR) in the immediate aftermath. The patient was treated with therapeutic plasma exchange (TPE) + IVIG  x 2 at that time, responding with improved LV function. He now presents with recurring graft dysfunction, which again could represent AMR, even though no donor-specific Anti-HLA antibodies have been detected. TPE/IVIG every other day was initiated on 04/20/2018 and continued with a second cycle on 04/22. The procedures were well tolerated both days. We will continue with TPE #3 today, one plasma volume with 5% albumin as replacement fluid, followed by IVIG.

## 2018-04-24 NOTE — PROGRESS NOTE ADULT - SUBJECTIVE AND OBJECTIVE BOX
69 y/o M who presents with a chief complaint of volume overload s/p bx (2018 20:47)    HPI:  68 y/o M s/p heart transplant on  with post-op course c/b primary graft dysfunction, initially thought to be immune-mediated due to positive B-cell flow (negative CDC cross match) and received Therapeutic Plasma Exchange (TPE)/IVIG x2 with improvement in LV function. The post-transplant course has been complicated by bilateral IJ/subclavian thrombi and he has a persistent small  right sided pleural effusion as well as chronic renal failure, thought to be related to Prograf.  Echo on 4/3 showed evidence of slight worsened LV strain, LVEF of 52%, and slightly diminished right ventricular systolic function.  RHC on  showed elevated biventricular filling pressures with normal cardiac output. Biopsy showed 1R/1A cellular rejection. C4D continues to be diffusely positive. DSA is unremarkable.  He continued to make progress and was discharged to home on 4/10. Returns today for RHC for cardiac biopsy and was found to have potassium of 6.0.  Received kaexylate in cath lab with +BM. 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      MEDICATIONS  (STANDING):  amLODIPine   Tablet 5 milliGRAM(s) Oral daily  aspirin enteric coated 81 milliGRAM(s) Oral daily  atovaquone Suspension 1500 milliGRAM(s) Oral daily  calcium citrate with vit D 315mg/250unit 2 Tablet(s) 2 Tablet(s) Oral two times a day  diltiazem    milliGRAM(s) Oral daily  famotidine    Tablet 20 milliGRAM(s) Oral two times a day  immune globulin gamma IVPB 10 Gram(s) IV Intermittent once  magnesium oxide 400 milliGRAM(s) Oral <User Schedule>  multivitamin 1 Tablet(s) Oral daily  mycophenolate mofetil 1000 milliGRAM(s) Oral <User Schedule>  nystatin    Suspension 145501 Unit(s) Oral every 6 hours  pravastatin 20 milliGRAM(s) Oral at bedtime  predniSONE   Tablet 12.5 milliGRAM(s) Oral <User Schedule>  silver sulfADIAZINE 1% Cream 1 Application(s) Topical two times a day  sodium bicarbonate 650 milliGRAM(s) Oral three times a day  tacrolimus 8 milliGRAM(s) Oral <User Schedule>  valGANciclovir 450 milliGRAM(s) Oral <User Schedule>    MEDICATIONS  (PRN):  docusate sodium 100 milliGRAM(s) Oral three times a day PRN Constipation    Vital Signs Last 24 Hrs  T(C): 35.7 (2018 08:00), Max: 36.7 (2018 00:00)  T(F): 96.3 (2018 08:00), Max: 98 (2018 00:00)  HR: 101 (2018 12:00) (91 - 111)  BP: 107/82 (2018 12:00) (100/71 - 135/88)  BP(mean): 102 (2018 12:00) (81 - 104)  RR: 35 (2018 12:00) (14 - 36)  SpO2: 100% (2018 11:00) (97% - 100%)    Daily     Daily Weight in k.5 (2018 00:00)    PHYSICAL EXAM:  General: WN/WD NAD  Eyes: EOMI, clear sclerae  ENT: Moist mucosa  Respiratory: CTA B/L  CV: S1, S2, RRR, no murmurs  Abdominal: Soft, NT, ND +BS  Musculoskeletal/Extremities: full range of motion X 4, no edema  Neurology: A&Ox3, no focal deficits   Skin: No rash, no petechia  Catheters/Tubes/Wires: Left internal jugular CVC               9.2    11.1  )-----------( 190      ( 2018 01:09 )             29.8     Hematocrit: 29.8 % ( @ 01:09)  Hematocrit: 32.4 % ( @ 03:01)  Hematocrit: 33.1 % ( @ 02:29)        138  |  104  |  37<H>  ----------------------------<  204<H>  5.6<H>   |  23  |  1.48<H>    Ca    8.9      2018 01:09  Phos  2.9       Mg     2.2     04-23    TPro  5.6<L>  /  Alb  3.8  /  TBili  0.4  /  DBili  x   /  AST  20  /  ALT  25  /  AlkPhos  52      Lactate Dehydrogenase, Serum: 346 U/L ( @ 03:00)  Lactate Dehydrogenase, Serum: 416 U/L ( @ 02:29)    Haptoglobin, Serum: 67 mg/dL ( @ 06:15)  Haptoglobin, Serum: 80 mg/dL ( @ 05:13)    Fibrinogen Assay: 312 mg/dL ( @ 08:38)

## 2018-04-24 NOTE — PROGRESS NOTE ADULT - ASSESSMENT
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 Found to have b/l IJ/subclavian thrombi as well at that time and was treated with ongoing AC. 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    OHT 2/2018  # s/p OHT now with graft dysfunction - He had his PP/IVIg s/p 2 treatments with the 3rd treatment planned for today. His TTE yesterdayshows improved LVEF to 57% today suggesting his initial issue was graft dysfunction. Tacro level continues to be elevated.   -c/w tacrolimus 8 mg BID with food tomorrow  -Steroids - c/w prednisone 12.5  mg po BID   -CellCept - continue 1000 mg PO BID.  -plan for IVIG # 3 treatment today.  Plan to sarai therapy with rituximab, will coordinate with hematology  	  # Antimicrobial prophylaxis:  Intermediate risk CMV - c/w Valcyte to 450 mg BID. Routine CMV PCR testing was negative on 4/19.  Intermediate risk Toxo - continue Mepron 1500mg PO daily with food.  PCP - continue Mepron 1500mg PO daily  Thrush - continue Nystatin S/S 5 mL QID until prednisone < 10 mg po BID.    # Additional treatments:  - Citracal + D 2 tabs PO BID  - multivitamin 1 tab daily  - Protonix 40 mg po daily for stress ulcer prophylaxis while on steroids    # Bilateral IJ/Subclavian thrombi -   - Will hold AC until he completes plasmapheresis as it depletes clotting factors. He was supposed to complete a 3 month course for UE DVT.  - CHERIE negative on last admission    # CAV PPx  - Continue ECASA 81mg PO daily  - Continue pravastatin 20mg PO QHS    # Hypertension  - He is currently on cardizem and norvasc for his BP control. Given the possibility that this could be non-HLA rejection, it would be ideal to use losartan instead of norvasc for BP control if his potassium level is under better control.  Continue current therapy for now.     # ID   - DLES improved and wound vac in place. Dressing changes per protocol of CTU. Off antibiotics.  - Right 3rd digit ulceration improving. Review if silver sulfadiazine dressings still required by plastics.   - HCV viral load by PCR 4/19 was 42,855. Therapy to be initiated by Dr. Thomas (Hepatology) after discharge.  - Transplant ID following    # endo  - SSI AC & HS    # Hyperkalemia - He received one dose of Kayexalate and K is now stable. No further treatment needed at this time. Low K diet, and will press forward wiwth adjust ment of tacrolimus.     Please call me at [phone] for any further questions up till 5 pm. For after hours, please call the covering cardiology on call fellow at [phone] for any further assistance.

## 2018-04-24 NOTE — PROGRESS NOTE ADULT - SUBJECTIVE AND OBJECTIVE BOX
Behavioral Cardiology Progress Note     HPI:  Mr. Baez is a 67 year-old man, , only son . Owns house he lives in with his brother.  ACC/AHA stage D chronic systolic heart failure due to NICM (LVEF 20%) s/p HM2 LVAD  c/b pump thrombus now s/p OHT  (CMV D-/R+ and Toxo D-/R+), with post-op course c/b primary graft dysfunction, initially thought to be immune-mediated due to positive B-cell flow (negative CDC cross match) and received plasmapharesis/IVIg x2 with improvement in LV function. Found to have b/l IJ/subclavian thrombi as well at that time and was treated with ongoing AC. 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  No PPH.     Current stressors:   Hospitalization   s/p Heart transplant (18)      Support system/family support: Good support from family and close friend      Coping strategies: Talking with family and staff, watching TV, his pastora, talking to his granddaughter     Understanding of medical illness and treatment plan:   Ongoing education on medication management provided by HT coordinator and other team members. Strategies being utilized to accommodate limited literacy (pictures of medication, frequent teach-back).  Good understanding of need for lifelong immunosuppressant medication, importance of calling HT coordinator.  Benefits from frequent review and teach-back of all education.     MSE:  Seen sitting in bed. A&Ox3.  Well-related with good eye contact. Speech normal rate and volume. Thought process goal directed. No evidence of any psychosis, delusions, jona. Mood "no one comes to see me."  Affect sad, tearful at times. Insight and judgment adequate. Behavioral Cardiology Progress Note     HPI:  Mr. Baez is a 67 year-old man, , only son . Owns house he lives in with his brother.  Stage D chronic systolic heart failure due to NICM, s/p LVAD  c/b pump thrombus now s/p OHT  (CMV D-/R+ and Toxo D-/R+), with post- op course c/b primary graft dysfunction.  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    Current stressors:   Hospitalization   s/p Heart transplant (18)      Support system/family support: Good support from family and close friend      Coping strategies: Talking with family and staff, watching TV, his pastora, talking to his granddaughter     Understanding of medical illness and treatment plan:   Ongoing education on medication management provided by HT coordinator and other team members. Strategies being utilized to accommodate limited literacy (pictures of medication, frequent teach-back).  Good understanding of need for lifelong immunosuppressant medication, importance of calling HT coordinator.  Benefits from frequent review and teach-back of all education.     MSE:  Seen sitting in bed. A&Ox3.  Well-related with good eye contact. Speech normal rate and volume. Thought process goal directed. No evidence of any psychosis, delusions, jona. Mood "no one comes to see me."  Affect sad, tearful at times. Insight and judgment adequate.

## 2018-04-25 ENCOUNTER — APPOINTMENT (OUTPATIENT)
Dept: CV DIAGNOSITCS | Facility: HOSPITAL | Age: 68
End: 2018-04-25

## 2018-04-25 ENCOUNTER — APPOINTMENT (OUTPATIENT)
Dept: NEPHROLOGY | Facility: CLINIC | Age: 68
End: 2018-04-25

## 2018-04-25 DIAGNOSIS — R69 ILLNESS, UNSPECIFIED: ICD-10-CM

## 2018-04-25 LAB
ALBUMIN SERPL ELPH-MCNC: 4.3 G/DL — SIGNIFICANT CHANGE UP (ref 3.3–5)
ALBUMIN SERPL ELPH-MCNC: 4.4 G/DL — SIGNIFICANT CHANGE UP (ref 3.3–5)
ALP SERPL-CCNC: 25 U/L — LOW (ref 40–120)
ALP SERPL-CCNC: 26 U/L — LOW (ref 40–120)
ALT FLD-CCNC: 17 U/L — SIGNIFICANT CHANGE UP (ref 10–45)
ALT FLD-CCNC: 18 U/L — SIGNIFICANT CHANGE UP (ref 10–45)
ANION GAP SERPL CALC-SCNC: 11 MMOL/L — SIGNIFICANT CHANGE UP (ref 5–17)
ANION GAP SERPL CALC-SCNC: 13 MMOL/L — SIGNIFICANT CHANGE UP (ref 5–17)
AST SERPL-CCNC: 19 U/L — SIGNIFICANT CHANGE UP (ref 10–40)
AST SERPL-CCNC: 19 U/L — SIGNIFICANT CHANGE UP (ref 10–40)
BILIRUB SERPL-MCNC: 0.6 MG/DL — SIGNIFICANT CHANGE UP (ref 0.2–1.2)
BILIRUB SERPL-MCNC: 0.7 MG/DL — SIGNIFICANT CHANGE UP (ref 0.2–1.2)
BUN SERPL-MCNC: 27 MG/DL — HIGH (ref 7–23)
BUN SERPL-MCNC: 27 MG/DL — HIGH (ref 7–23)
CALCIUM SERPL-MCNC: 9 MG/DL — SIGNIFICANT CHANGE UP (ref 8.4–10.5)
CALCIUM SERPL-MCNC: 9.1 MG/DL — SIGNIFICANT CHANGE UP (ref 8.4–10.5)
CHLORIDE SERPL-SCNC: 108 MMOL/L — SIGNIFICANT CHANGE UP (ref 96–108)
CHLORIDE SERPL-SCNC: 108 MMOL/L — SIGNIFICANT CHANGE UP (ref 96–108)
CO2 SERPL-SCNC: 20 MMOL/L — LOW (ref 22–31)
CO2 SERPL-SCNC: 21 MMOL/L — LOW (ref 22–31)
CREAT SERPL-MCNC: 1.11 MG/DL — SIGNIFICANT CHANGE UP (ref 0.5–1.3)
CREAT SERPL-MCNC: 1.11 MG/DL — SIGNIFICANT CHANGE UP (ref 0.5–1.3)
GLUCOSE SERPL-MCNC: 178 MG/DL — HIGH (ref 70–99)
GLUCOSE SERPL-MCNC: 81 MG/DL — SIGNIFICANT CHANGE UP (ref 70–99)
HAPTOGLOB SERPL-MCNC: 54 MG/DL — SIGNIFICANT CHANGE UP (ref 34–200)
HAV IGM SER-ACNC: SIGNIFICANT CHANGE UP
HBV CORE IGM SER-ACNC: SIGNIFICANT CHANGE UP
HBV SURFACE AG SER-ACNC: SIGNIFICANT CHANGE UP
HCT VFR BLD CALC: 32.2 % — LOW (ref 39–50)
HCV AB S/CO SERPL IA: 0.21 S/CO — SIGNIFICANT CHANGE UP
HCV AB SERPL-IMP: SIGNIFICANT CHANGE UP
HGB BLD-MCNC: 9.8 G/DL — LOW (ref 13–17)
LDH SERPL L TO P-CCNC: 264 U/L — HIGH (ref 50–242)
MAGNESIUM SERPL-MCNC: 2.2 MG/DL — SIGNIFICANT CHANGE UP (ref 1.6–2.6)
MCHC RBC-ENTMCNC: 28.8 PG — SIGNIFICANT CHANGE UP (ref 27–34)
MCHC RBC-ENTMCNC: 30.3 GM/DL — LOW (ref 32–36)
MCV RBC AUTO: 94.9 FL — SIGNIFICANT CHANGE UP (ref 80–100)
PHOSPHATE SERPL-MCNC: 3.2 MG/DL — SIGNIFICANT CHANGE UP (ref 2.5–4.5)
PLATELET # BLD AUTO: 170 K/UL — SIGNIFICANT CHANGE UP (ref 150–400)
POTASSIUM SERPL-MCNC: 5.2 MMOL/L — SIGNIFICANT CHANGE UP (ref 3.5–5.3)
POTASSIUM SERPL-MCNC: 6.1 MMOL/L — HIGH (ref 3.5–5.3)
POTASSIUM SERPL-SCNC: 5.2 MMOL/L — SIGNIFICANT CHANGE UP (ref 3.5–5.3)
POTASSIUM SERPL-SCNC: 6.1 MMOL/L — HIGH (ref 3.5–5.3)
PROT SERPL-MCNC: 5.7 G/DL — LOW (ref 6–8.3)
PROT SERPL-MCNC: 5.9 G/DL — LOW (ref 6–8.3)
RBC # BLD: 3.39 M/UL — LOW (ref 4.2–5.8)
RBC # FLD: 19.9 % — HIGH (ref 10.3–14.5)
SODIUM SERPL-SCNC: 140 MMOL/L — SIGNIFICANT CHANGE UP (ref 135–145)
SODIUM SERPL-SCNC: 141 MMOL/L — SIGNIFICANT CHANGE UP (ref 135–145)
TACROLIMUS SERPL-MCNC: 14.9 NG/ML — SIGNIFICANT CHANGE UP
WBC # BLD: 11 K/UL — HIGH (ref 3.8–10.5)
WBC # FLD AUTO: 11 K/UL — HIGH (ref 3.8–10.5)

## 2018-04-25 PROCEDURE — 99233 SBSQ HOSP IP/OBS HIGH 50: CPT

## 2018-04-25 RX ORDER — ACETAMINOPHEN 500 MG
650 TABLET ORAL ONCE
Qty: 0 | Refills: 0 | Status: COMPLETED | OUTPATIENT
Start: 2018-04-25 | End: 2018-04-25

## 2018-04-25 RX ORDER — RITUXIMAB 10 MG/ML
700 INJECTION, SOLUTION INTRAVENOUS ONCE
Qty: 0 | Refills: 0 | Status: COMPLETED | OUTPATIENT
Start: 2018-04-25 | End: 2018-04-25

## 2018-04-25 RX ORDER — DIPHENHYDRAMINE HCL 50 MG
50 CAPSULE ORAL ONCE
Qty: 0 | Refills: 0 | Status: COMPLETED | OUTPATIENT
Start: 2018-04-25 | End: 2018-04-25

## 2018-04-25 RX ADMIN — Medication 500000 UNIT(S): at 05:47

## 2018-04-25 RX ADMIN — ATOVAQUONE 1500 MILLIGRAM(S): 750 SUSPENSION ORAL at 12:11

## 2018-04-25 RX ADMIN — Medication 1 APPLICATION(S): at 06:17

## 2018-04-25 RX ADMIN — Medication 500000 UNIT(S): at 00:01

## 2018-04-25 RX ADMIN — VALGANCICLOVIR 450 MILLIGRAM(S): 450 TABLET, FILM COATED ORAL at 20:03

## 2018-04-25 RX ADMIN — Medication 500000 UNIT(S): at 18:02

## 2018-04-25 RX ADMIN — Medication 12.5 MILLIGRAM(S): at 20:03

## 2018-04-25 RX ADMIN — FAMOTIDINE 20 MILLIGRAM(S): 10 INJECTION INTRAVENOUS at 18:02

## 2018-04-25 RX ADMIN — Medication 650 MILLIGRAM(S): at 21:15

## 2018-04-25 RX ADMIN — FAMOTIDINE 20 MILLIGRAM(S): 10 INJECTION INTRAVENOUS at 05:49

## 2018-04-25 RX ADMIN — MAGNESIUM OXIDE 400 MG ORAL TABLET 400 MILLIGRAM(S): 241.3 TABLET ORAL at 09:08

## 2018-04-25 RX ADMIN — Medication 240 MILLIGRAM(S): at 05:47

## 2018-04-25 RX ADMIN — Medication 500000 UNIT(S): at 12:12

## 2018-04-25 RX ADMIN — Medication 50 MILLIGRAM(S): at 09:52

## 2018-04-25 RX ADMIN — Medication 12.5 MILLIGRAM(S): at 08:05

## 2018-04-25 RX ADMIN — Medication 1 TABLET(S): at 12:12

## 2018-04-25 RX ADMIN — RITUXIMAB 11.67 MILLIGRAM(S): 10 INJECTION, SOLUTION INTRAVENOUS at 11:12

## 2018-04-25 RX ADMIN — AMLODIPINE BESYLATE 5 MILLIGRAM(S): 2.5 TABLET ORAL at 05:49

## 2018-04-25 RX ADMIN — Medication 81 MILLIGRAM(S): at 12:12

## 2018-04-25 RX ADMIN — Medication 650 MILLIGRAM(S): at 09:52

## 2018-04-25 RX ADMIN — Medication 1 APPLICATION(S): at 18:03

## 2018-04-25 RX ADMIN — MAGNESIUM OXIDE 400 MG ORAL TABLET 400 MILLIGRAM(S): 241.3 TABLET ORAL at 21:15

## 2018-04-25 RX ADMIN — MYCOPHENOLATE MOFETIL 1000 MILLIGRAM(S): 250 CAPSULE ORAL at 20:01

## 2018-04-25 RX ADMIN — VALGANCICLOVIR 450 MILLIGRAM(S): 450 TABLET, FILM COATED ORAL at 08:08

## 2018-04-25 RX ADMIN — Medication 20 MILLIGRAM(S): at 21:15

## 2018-04-25 RX ADMIN — MYCOPHENOLATE MOFETIL 1000 MILLIGRAM(S): 250 CAPSULE ORAL at 08:07

## 2018-04-25 RX ADMIN — TACROLIMUS 8 MILLIGRAM(S): 5 CAPSULE ORAL at 08:27

## 2018-04-25 RX ADMIN — Medication 650 MILLIGRAM(S): at 14:10

## 2018-04-25 RX ADMIN — Medication 650 MILLIGRAM(S): at 05:47

## 2018-04-25 RX ADMIN — TACROLIMUS 8 MILLIGRAM(S): 5 CAPSULE ORAL at 20:02

## 2018-04-25 NOTE — PROGRESS NOTE ADULT - SUBJECTIVE AND OBJECTIVE BOX
Learner: Patient    Patient received a cardiac transplant.    Method used: Verbal discussion and written material    Medication safety was discussed with the patient: the patient is scheduled to administer rituximab 700 mG once a week for 2 doses. I went over the side effects, why we use this medication and what to expect while infusion starts.     Patient was able to repeat and verbalize key points    Education Summary: purpose of medication, side effects, why we use premedication.    Time spent discharge education: 20 minutes    MEDICATIONS  (STANDING):  amLODIPine   Tablet 5 milliGRAM(s) Oral daily  aspirin enteric coated 81 milliGRAM(s) Oral daily  atovaquone Suspension 1500 milliGRAM(s) Oral daily  calcium citrate with vit D 315mg/250unit 2 Tablet(s) 2 Tablet(s) Oral two times a day  diltiazem    milliGRAM(s) Oral daily  enoxaparin Injectable 80 milliGRAM(s) SubCutaneous <User Schedule>  famotidine    Tablet 20 milliGRAM(s) Oral two times a day  magnesium oxide 400 milliGRAM(s) Oral <User Schedule>  multivitamin 1 Tablet(s) Oral daily  mycophenolate mofetil 1000 milliGRAM(s) Oral <User Schedule>  nystatin    Suspension 813080 Unit(s) Oral every 6 hours  pravastatin 20 milliGRAM(s) Oral at bedtime  predniSONE   Tablet 12.5 milliGRAM(s) Oral <User Schedule>  silver sulfADIAZINE 1% Cream 1 Application(s) Topical two times a day  sodium bicarbonate 650 milliGRAM(s) Oral three times a day  tacrolimus 7 milliGRAM(s) Oral <User Schedule>  valGANciclovir 450 milliGRAM(s) Oral <User Schedule>    MEDICATIONS  (PRN):  docusate sodium 100 milliGRAM(s) Oral three times a day PRN Constipation                        10.1   12.8  )-----------( 175      ( 26 Apr 2018 01:27 )             32.4     04-26    x   |  x   |  x   ----------------------------<  x   5.5<H>   |  x   |  x     Ca    9.3      26 Apr 2018 01:27  Phos  3.2     04-25  Mg     2.2     04-25    TPro  5.9<L>  /  Alb  4.3  /  TBili  0.5  /  DBili  x   /  AST  21  /  ALT  22  /  AlkPhos  39<L>  04-26    LIVER FUNCTIONS - ( 26 Apr 2018 01:27 )  Alb: 4.3 g/dL / Pro: 5.9 g/dL / ALK PHOS: 39 U/L / ALT: 22 U/L / AST: 21 U/L / GGT: x           Tacrolimus (), Serum: 14.5 ng/mL (04-26-18 @ 07:29)  Tacrolimus (), Serum: 14.9 ng/mL (04-25-18 @ 07:40)  Tacrolimus (), Serum: 15.2 ng/mL (04-24-18 @ 07:41)

## 2018-04-25 NOTE — PROGRESS NOTE ADULT - SUBJECTIVE AND OBJECTIVE BOX
INFECTIOUS DISEASES FOLLOW UP-- Sujey Lujan  [phone]    This is a follow up note for this  68yMale with  Heart failure, s/p orthotopic heart transplant from a genotype 1A HCV+ donor on 2/23. Patient underwent rituxan treatment infusion earlier today and has had his CVP line removed  Feeling well      ROS:  CONSTITUTIONAL:  No fever, good appetite  CARDIOVASCULAR:  No chest pain or palpitations  RESPIRATORY:  No dyspnea  GASTROINTESTINAL:  No nausea, vomiting, diarrhea, or abdominal pain  GENITOURINARY:  No dysuria  NEUROLOGIC:  No headache,     Allergies    No Known Allergies    Intolerances        ANTIBIOTICS/RELEVANT:  antimicrobials  atovaquone Suspension 1500 milliGRAM(s) Oral daily  nystatin    Suspension 432384 Unit(s) Oral every 6 hours  valGANciclovir 450 milliGRAM(s) Oral <User Schedule>    immunologic:  mycophenolate mofetil 1000 milliGRAM(s) Oral <User Schedule>  tacrolimus 8 milliGRAM(s) Oral <User Schedule>    OTHER:  amLODIPine   Tablet 5 milliGRAM(s) Oral daily  aspirin enteric coated 81 milliGRAM(s) Oral daily  calcium citrate with vit D 315mg/250unit 2 Tablet(s) 2 Tablet(s) Oral two times a day  diltiazem    milliGRAM(s) Oral daily  docusate sodium 100 milliGRAM(s) Oral three times a day PRN  famotidine    Tablet 20 milliGRAM(s) Oral two times a day  magnesium oxide 400 milliGRAM(s) Oral <User Schedule>  multivitamin 1 Tablet(s) Oral daily  pravastatin 20 milliGRAM(s) Oral at bedtime  predniSONE   Tablet 12.5 milliGRAM(s) Oral <User Schedule>  silver sulfADIAZINE 1% Cream 1 Application(s) Topical two times a day  sodium bicarbonate 650 milliGRAM(s) Oral three times a day      Objective:  Vital Signs Last 24 Hrs  T(C): 36.6 (25 Apr 2018 16:00), Max: 36.6 (25 Apr 2018 12:30)  T(F): 97.8 (25 Apr 2018 16:00), Max: 97.9 (25 Apr 2018 14:30)  HR: 106 (25 Apr 2018 17:00) (89 - 106)  BP: 115/77 (25 Apr 2018 17:00) (100/73 - 133/78)  BP(mean): 92 (25 Apr 2018 17:00) (74 - 101)  RR: 28 (25 Apr 2018 11:00) (12 - 35)  SpO2: 98% (25 Apr 2018 16:00) (98% - 100%)    PHYSICAL EXAM:  Constitutional:no acute distress  Eyes:WALT, EOMI  Ear/Nose/Throat: no oral lesions, 	  Respiratory: clear BL  Cardiovascular: S1S2  Gastrointestinal:soft, (+) BS, no tenderness  Extremities:no e/e/c  No Lymphadenopathy  IV sites not inflammed.    LABS:                        9.8    11.0  )-----------( 170      ( 25 Apr 2018 03:53 )             32.2     04-25    141  |  108  |  27<H>  ----------------------------<  81  5.2   |  20<L>  |  1.11    Ca    9.1      25 Apr 2018 06:52  Phos  3.2     04-25  Mg     2.2     04-25    TPro  5.9<L>  /  Alb  4.4  /  TBili  0.7  /  DBili  x   /  AST  19  /  ALT  18  /  AlkPhos  26<L>  04-25          MICROBIOLOGY:    Hepatitis C Virus RNA Detection by PCR (04.19.18 @ 13:47)    Hepatitis C RNA, Log Value: 4.63: HCV RNA Quantification by RT-PCR using Renee m2000:  Assay dynamic range:  12 IU/mL to 100,000,000 HCV RNA IU/mL  1.08 (log10) IU/mL to 8.00 (log10) IU/mL  Assay reference range: Not Detected  The results of this test should be interpreted with consideration of all  clinical and laboratory findings. A result of  "No HCV RNA Detected" does  not preclude the possibility of infection with hepatitis C virus.  In  particular, caution should be used when interpreting low level positive  results when the test is used for diagnostic purposes. LogIU/mL    Hepatitis C Virus RNA Detection by PCR: 86805 IU/mL    Cytomegalovirus By PCR (04.19.18 @ 13:47)    Cytomegalovirus By PCR:   NotDetec            RECENT CULTURES:      RADIOLOGY & ADDITIONAL STUDIES:    < from: Xray Chest 1 View- PORTABLE-Routine (04.23.18 @ 03:11) >  Impression:    The heart is slightly enlarged. Right pleural effusion. Platelike   atelectasis right lower lobe. The left lung is clear. Status post   sternotomy. A central line is seen on the left and the tip is in the   entrance to the superior vena cava. No pneumothorax.    < end of copied text >

## 2018-04-25 NOTE — CHART NOTE - NSCHARTNOTEFT_GEN_A_CORE
Source: Patient [ x ]    Family [ ]     other [ x ] NP, RN, Comprehensive chart review     Pt seen for nutrition follow up. Reports good appetite, eating 100% of meals and requesting additional items. Assisted pt with menu selections. Denies GI distress. Pt requested to defer review of therapeutic diet guidelines at this time.     Pt s/p heart transplant on 2/23/18 and discharged on 4/10/18; came in for right heart catheterization and biopsy on 4/19/18, found with hyperkalemia and graft dysfunction, now s/p 3 treatments of PP/IVIg. 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    Diet : regular, low fiber, no concentrated potassium, Nepro 1 can/day  Noted diet changed from renal diet today.      Admission Weight: 71kg  Current Weight: 70.8kg  Weight stable since admission with +/- 1kg fluctuation.    Pertinent Medications: MEDICATIONS  (STANDING):  amLODIPine   Tablet 5 milliGRAM(s) Oral daily  aspirin enteric coated 81 milliGRAM(s) Oral daily  atovaquone Suspension 1500 milliGRAM(s) Oral daily  calcium citrate with vit D 315mg/250unit 2 Tablet(s) 2 Tablet(s) Oral two times a day  diltiazem    milliGRAM(s) Oral daily  famotidine    Tablet 20 milliGRAM(s) Oral two times a day  magnesium oxide 400 milliGRAM(s) Oral <User Schedule>  multivitamin 1 Tablet(s) Oral daily  mycophenolate mofetil 1000 milliGRAM(s) Oral <User Schedule>  nystatin    Suspension 537113 Unit(s) Oral every 6 hours  pravastatin 20 milliGRAM(s) Oral at bedtime  predniSONE   Tablet 12.5 milliGRAM(s) Oral <User Schedule>  silver sulfADIAZINE 1% Cream 1 Application(s) Topical two times a day  sodium bicarbonate 650 milliGRAM(s) Oral three times a day  tacrolimus 8 milliGRAM(s) Oral <User Schedule>  valGANciclovir 450 milliGRAM(s) Oral <User Schedule>    MEDICATIONS  (PRN):  docusate sodium 100 milliGRAM(s) Oral three times a day PRN Constipation    Pertinent Labs:    Complete Blood Count (04.25.18 @ 03:53)    Hemoglobin: 9.8 g/dL - low    Hematocrit: 32.2 % - low  Comprehensive Metabolic Panel (04.25.18 @ 06:52)    Sodium, Serum: 141 mmol/L    Potassium, Serum: 5.2 mmol/L    Chloride, Serum: 108 mmol/L    Carbon Dioxide, Serum: 20 mmol/L - low    Anion Gap, Serum: 13 mmol/L    Blood Urea Nitrogen, Serum: 27 mg/dL - high    Creatinine, Serum: 1.11 mg/dL    Glucose, Serum: 81 mg/dL    Calcium, Total Serum: 9.1 mg/dL    Protein Total, Serum: 5.9 g/dL - low    Albumin, Serum: 4.4 g/dL    Bilirubin Total, Serum: 0.7 mg/dL    Alkaline Phosphatase, Serum: 26 U/L - high    Aspartate Aminotransferase (AST/SGOT): 19 U/L    Alanine Aminotransferase (ALT/SGPT): 18 U/L  Lactate Dehydrogenase, Serum (04.25.18 @ 03:53)    Lactate Dehydrogenase, Serum: 264: Mild hemolysis results may be falsely elevated U/L - high  Magnesium, Serum (04.25.18 @ 03:53)    Magnesium, Serum: 2.2 mg/dL  Phosphorus Level, Serum (04.25.18 @ 03:53)    Phosphorus Level, Serum: 3.2 mg/dL      Skin: no pressure ulcers noted.    Estimated Needs:   [ x ] no change since previous assessment  [ ] recalculated:       Previous Nutrition Diagnosis:   Moderate Malnutrition remains, improving with PO intake of meals and addition of PO nutritional supplements.       New Nutrition Diagnosis: [ x ] not applicable      Interventions:   1. Encourage PO intake with all meals.  2. Provide food preferences within therapeutic diet when requested.  3. Assist pt with menu selections as needed.  4. RD to follow up with diet education as accepted by pt.       Monitoring and Evaluation:     [ x ] PO intake [ x ] Tolerance to diet prescription [ x ] weights [ x ] follow up per protocol    [ ] other:

## 2018-04-25 NOTE — PROGRESS NOTE ADULT - ASSESSMENT
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 Found to have b/l IJ/subclavian thrombi as well at that time and was treated with ongoing AC. 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    OHT 2/2018  # s/p OHT now with graft dysfunction - He had his PP/IVIg s/p 2 treatments with the 3rd treatment planned for today. His TTE yesterdayshows improved LVEF to 57% today suggesting his initial issue was graft dysfunction. Tacro level continues to be elevated.   -c/w tacrolimus 8 mg BID with food tomorrow  -Steroids - c/w prednisone 12.5  mg po BID   -CellCept - continue 1000 mg PO BID.  -plan for IVIG # 3 treatment yesterday. Rituximab today.   	  # Antimicrobial prophylaxis:  Intermediate risk CMV - c/w Valcyte to 450 mg BID. Routine CMV PCR testing was negative on 4/19.  Intermediate risk Toxo - continue Mepron 1500mg PO daily with food.  PCP - continue Mepron 1500mg PO daily  Thrush - continue Nystatin S/S 5 mL QID until prednisone < 10 mg po BID.    # Additional treatments:  - Citracal + D 2 tabs PO BID  - multivitamin 1 tab daily  - Protonix 40 mg po daily for stress ulcer prophylaxis while on steroids    # Bilateral IJ/Subclavian thrombi -   - Will hold AC until he completes plasmapheresis as it depletes clotting factors. He was supposed to complete a 3 month course for UE DVT.  - CHERIE negative on last admission    # CAV PPx  - Continue ECASA 81mg PO daily  - Continue pravastatin 20mg PO QHS    # Hypertension  - He is currently on cardizem and norvasc for his BP control. Given the possibility that this could be non-HLA rejection, it would be ideal to use losartan instead of norvasc for BP control if his potassium level is under better control.  Continue current therapy for now.     # ID   - DLES improved and wound vac in place. Dressing changes per protocol of CTU. Off antibiotics.  - Right 3rd digit ulceration improving. Review if silver sulfadiazine dressings still required by plastics.   - HCV viral load by PCR 4/19 was 42,855. Therapy to be initiated by Dr. Thomas (Hepatology) after discharge.  - Transplant ID following    # endo  - SSI AC & HS    # Hyperkalemia - He received one dose of Kayexalate and K is now stable. No further treatment needed at this time. Low K diet, and will press forward wiwth adjust ment of tacrolimus.     Please call me at [phone] for any further questions up till 5 pm. For after hours, please call the covering cardiology [place] fellow [place] [phone] for any further assistance.

## 2018-04-25 NOTE — ADVANCED PRACTICE NURSE CONSULT - ASSESSMENT
Pt in bed in CTU A&Ox4 HT/WT verified-pre v/s stable afebrile HT/WT verified-Dr Bonner and Manuel ADAMSPA aware of all lab results-and hx of Hep-C- titer drawn prior to tx and to be monitored closely with therapy-continue with tx as ordered; Pt has left IJ shiley in place used for plasmaphoresis-Donor Room RN notified and OK to use for infusion-accessed by CTU RN-site without redness no swelling or pain-positive brisk blood return noted; Pre medicated with benadryl 50mg IVP and tylenol 650mg PO x1 Discussed with pt drug dosage and possible side effects[consent in chart] pt verbalized understanding of all education[hand out given] Rituxan 375mg/g0=609is in NS started @1130am @50cc/hr v/s infusing via pump monitored [id number] and stable rate increased by 50cc/hr every 30 minutes until max rate of 400cc/hr pt monitored closely throughout infusion and remained  stable throughout infusion tolerated without any adverse reactions noted see flow sheet for v/s details . primary nurse given full report

## 2018-04-25 NOTE — PROGRESS NOTE ADULT - ASSESSMENT
67 yo man with a history of an orthotopic heart transplant on 2/23/18 from a HCV genotype 1A donor. Post op course complicated by acute rejection requiring plasmapheresis, CRE pseudomonas pneumonia, now resolved, feeling well post Rituxan infusion    Maintain CMV and PCP prophylaxis with valganciclovir and Mepron  check - CMV viral load  HCV viral load slowly increasing- given the fact that Rituxan may increase the viral load favor beginning HCV treatment if possible

## 2018-04-25 NOTE — PROGRESS NOTE ADULT - SUBJECTIVE AND OBJECTIVE BOX
Date of Admission:    24H hour events:     MEDICATIONS:  amLODIPine   Tablet 5 milliGRAM(s) Oral daily  aspirin enteric coated 81 milliGRAM(s) Oral daily  diltiazem    milliGRAM(s) Oral daily  atovaquone Suspension 1500 milliGRAM(s) Oral daily  nystatin    Suspension 434209 Unit(s) Oral every 6 hours  valGANciclovir 450 milliGRAM(s) Oral <User Schedule>  docusate sodium 100 milliGRAM(s) Oral three times a day PRN  famotidine    Tablet 20 milliGRAM(s) Oral two times a day  pravastatin 20 milliGRAM(s) Oral at bedtime  predniSONE   Tablet 12.5 milliGRAM(s) Oral <User Schedule>  magnesium oxide 400 milliGRAM(s) Oral <User Schedule>  multivitamin 1 Tablet(s) Oral daily  mycophenolate mofetil 1000 milliGRAM(s) Oral <User Schedule>  silver sulfADIAZINE 1% Cream 1 Application(s) Topical two times a day  sodium bicarbonate 650 milliGRAM(s) Oral three times a day  tacrolimus 8 milliGRAM(s) Oral <User Schedule>      REVIEW OF SYSTEMS:  Complete 10point ROS negative except as documented above.    PHYSICAL EXAM:  T(C): 36.4 (04-25-18 @ 08:00), Max: 36.4 (04-25-18 @ 08:00)  HR: 96 (04-25-18 @ 11:00) (89 - 109)  BP: 116/79 (04-25-18 @ 12:00) (100/73 - 132/79)  RR: 28 (04-25-18 @ 11:00) (12 - 35)  SpO2: 100% (04-25-18 @ 07:00) (98% - 100%)  Wt(kg): --  I&O's Summary    24 Apr 2018 07:01  -  25 Apr 2018 07:00  --------------------------------------------------------  IN: 1046 mL / OUT: 2350 mL / NET: -1304 mL    25 Apr 2018 07:01  -  25 Apr 2018 12:00  --------------------------------------------------------  IN: 50 mL / OUT: 0 mL / NET: 50 mL        Appearance: Normal	  HEENT:   Normal oral mucosa, PERRL, EOMI	  Lymphatic: No lymphadenopathy  Cardiovascular: Normal S1 S2, No JVD, No murmurs, No edema  Respiratory: Lungs clear to auscultation	  Psychiatry: A & O x 3, Mood & affect appropriate  Gastrointestinal:  Soft, Non-tender, + BS	  Skin: No rashes, No ecchymoses, No cyanosis	  Neurologic: Non-focal  Extremities: Normal range of motion, No clubbing, cyanosis or edema  Vascular: Peripheral pulses palpable 2+ bilaterally        LABS:	 	    CBC Full  -  ( 25 Apr 2018 03:53 )  WBC Count : 11.0 K/uL  Hemoglobin : 9.8 g/dL  Hematocrit : 32.2 %  Platelet Count - Automated : 170 K/uL  Mean Cell Volume : 94.9 fl  Mean Cell Hemoglobin : 28.8 pg  Mean Cell Hemoglobin Concentration : 30.3 gm/dL  Auto Neutrophil # : x  Auto Lymphocyte # : x  Auto Monocyte # : x  Auto Eosinophil # : x  Auto Basophil # : x  Auto Neutrophil % : x  Auto Lymphocyte % : x  Auto Monocyte % : x  Auto Eosinophil % : x  Auto Basophil % : x    04-25    141  |  108  |  27<H>  ----------------------------<  81  5.2   |  20<L>  |  1.11  04-25    140  |  108  |  27<H>  ----------------------------<  178<H>  6.1<H>   |  21<L>  |  1.11    Ca    9.1      25 Apr 2018 06:52  Ca    9.0      25 Apr 2018 03:53  Phos  3.2     04-25  Mg     2.2     04-25    TPro  5.9<L>  /  Alb  4.4  /  TBili  0.7  /  DBili  x   /  AST  19  /  ALT  18  /  AlkPhos  26<L>  04-25  TPro  5.7<L>  /  Alb  4.3  /  TBili  0.6  /  DBili  x   /  AST  19  /  ALT  17  /  AlkPhos  25<L>  04-25    Tacrolimus 14.9    proBNP: Serum Pro-Brain Natriuretic Peptide: 3992 pg/mL (04-22 @ 02:29)    Lipid Profile:   HgA1c:   TSH:       CARDIAC MARKERS:            TELEMETRY: 	    ECG:  	  RADIOLOGY:  OTHER: 	    PREVIOUS DIAGNOSTIC TESTING:    [ ] Echocardiogram:  [ ]  Catheterization:  [ ] Stress Test:  	  	  ASSESSMENT/PLAN: 	    Heath Bonner MD   423[address] 24H hour events: No acute events overnight. IVIG 3rd treatment yesterday. Rituxibab given today.     MEDICATIONS:  amLODIPine   Tablet 5 milliGRAM(s) Oral daily  aspirin enteric coated 81 milliGRAM(s) Oral daily  diltiazem    milliGRAM(s) Oral daily  atovaquone Suspension 1500 milliGRAM(s) Oral daily  nystatin    Suspension 090678 Unit(s) Oral every 6 hours  valGANciclovir 450 milliGRAM(s) Oral <User Schedule>  docusate sodium 100 milliGRAM(s) Oral three times a day PRN  famotidine    Tablet 20 milliGRAM(s) Oral two times a day  pravastatin 20 milliGRAM(s) Oral at bedtime  predniSONE   Tablet 12.5 milliGRAM(s) Oral <User Schedule>  magnesium oxide 400 milliGRAM(s) Oral <User Schedule>  multivitamin 1 Tablet(s) Oral daily  mycophenolate mofetil 1000 milliGRAM(s) Oral <User Schedule>  silver sulfADIAZINE 1% Cream 1 Application(s) Topical two times a day  sodium bicarbonate 650 milliGRAM(s) Oral three times a day  tacrolimus 8 milliGRAM(s) Oral <User Schedule>      REVIEW OF SYSTEMS:  Complete 10point ROS negative except as documented above.    PHYSICAL EXAM:  T(C): 36.4 (04-25-18 @ 08:00), Max: 36.4 (04-25-18 @ 08:00)  HR: 96 (04-25-18 @ 11:00) (89 - 109)  BP: 116/79 (04-25-18 @ 12:00) (100/73 - 132/79)  RR: 28 (04-25-18 @ 11:00) (12 - 35)  SpO2: 100% (04-25-18 @ 07:00) (98% - 100%)  Wt(kg): --  I&O's Summary    24 Apr 2018 07:01  -  25 Apr 2018 07:00  --------------------------------------------------------  IN: 1046 mL / OUT: 2350 mL / NET: -1304 mL    25 Apr 2018 07:01  -  25 Apr 2018 12:00  --------------------------------------------------------  IN: 50 mL / OUT: 0 mL / NET: 50 mL      Appearance: Normal	  HEENT:   Normal oral mucosa, PERRL, EOMI	  Lymphatic: No lymphadenopathy  Cardiovascular: Normal S1 S2, No JVD, No murmurs, No edema  Respiratory: Lungs clear to auscultation	  Psychiatry: A & O x 3, Mood & affect appropriate  Gastrointestinal:  Soft, Non-tender, + BS	  Skin: No rashes, No ecchymoses, No cyanosis	  Neurologic: Non-focal  Extremities: Normal range of motion, No clubbing, cyanosis or edema  Vascular: Peripheral pulses palpable 2+ bilaterally      LABS:	 	    CBC Full  -  ( 25 Apr 2018 03:53 )  WBC Count : 11.0 K/uL  Hemoglobin : 9.8 g/dL  Hematocrit : 32.2 %  Platelet Count - Automated : 170 K/uL  Mean Cell Volume : 94.9 fl  Mean Cell Hemoglobin : 28.8 pg  Mean Cell Hemoglobin Concentration : 30.3 gm/dL  Auto Neutrophil # : x  Auto Lymphocyte # : x  Auto Monocyte # : x  Auto Eosinophil # : x  Auto Basophil # : x  Auto Neutrophil % : x  Auto Lymphocyte % : x  Auto Monocyte % : x  Auto Eosinophil % : x  Auto Basophil % : x    04-25    141  |  108  |  27<H>  ----------------------------<  81  5.2   |  20<L>  |  1.11  04-25    140  |  108  |  27<H>  ----------------------------<  178<H>  6.1<H>   |  21<L>  |  1.11    Ca    9.1      25 Apr 2018 06:52  Ca    9.0      25 Apr 2018 03:53  Phos  3.2     04-25  Mg     2.2     04-25    TPro  5.9<L>  /  Alb  4.4  /  TBili  0.7  /  DBili  x   /  AST  19  /  ALT  18  /  AlkPhos  26<L>  04-25  TPro  5.7<L>  /  Alb  4.3  /  TBili  0.6  /  DBili  x   /  AST  19  /  ALT  17  /  AlkPhos  25<L>  04-25    Tacrolimus 14.9    proBNP: Serum Pro-Brain Natriuretic Peptide: 3992 pg/mL (04-22 @ 02:29)    Lipid Profile:   HgA1c:   TSH:     PREVIOUS DIAGNOSTIC TESTING:    [ ] Echocardiogram:  [ ]  Catheterization:  [ ] Stress Test:  	  	  ASSESSMENT/PLAN:

## 2018-04-25 NOTE — PROGRESS NOTE ADULT - ATTENDING COMMENTS
No events overnight.   Currently receiving first dose ritoxan.  Meds: Prograf 8 bid (level 14). Pred 12.5 bid, Cellcept 1g bid, Mepron, Valcyte 450 bid. Amlod 5, Dilt 240.   No fevers. 100/-132/, .   exam unremarkable.   I/O -1300  04-25    141  |  108  |  27<H>  ----------------------------<  81  5.2   |  20<L>  |  1.11    Ca    9.1      25 Apr 2018 06:52  Phos  3.2     04-25  Mg     2.2     04-25    TPro  5.9<L>  /  Alb  4.4  /  TBili  0.7  /  DBili  x   /  AST  19  /  ALT  18  /  AlkPhos  26<L>  04-25                        9.8    11.0  )-----------( 170      ( 25 Apr 2018 03:53 )             32.2   Case discussed with ID and hepatology re administration of ritoxan in setting of untreated Hep C viral load.   Dr Thomas has advised that antiviral therapy can be delayed to the outpatient setting for this patient.   Plan:  Remove vascath after ritoxan.  d/c home tomorrow on current medical regimen.   B cell panel prior to repeat ritoxan administration next week.   cardiac biopsy 2 weeks.  For fibroscan tomorrow after d/c.  Marvin Campbell

## 2018-04-26 ENCOUNTER — TRANSCRIPTION ENCOUNTER (OUTPATIENT)
Age: 68
End: 2018-04-26

## 2018-04-26 ENCOUNTER — APPOINTMENT (OUTPATIENT)
Age: 68
End: 2018-04-26

## 2018-04-26 ENCOUNTER — OTHER (OUTPATIENT)
Age: 68
End: 2018-04-26

## 2018-04-26 VITALS — SYSTOLIC BLOOD PRESSURE: 103 MMHG | DIASTOLIC BLOOD PRESSURE: 70 MMHG

## 2018-04-26 LAB
ALBUMIN SERPL ELPH-MCNC: 4.3 G/DL — SIGNIFICANT CHANGE UP (ref 3.3–5)
ALP SERPL-CCNC: 39 U/L — LOW (ref 40–120)
ALT FLD-CCNC: 22 U/L — SIGNIFICANT CHANGE UP (ref 10–45)
ANION GAP SERPL CALC-SCNC: 10 MMOL/L — SIGNIFICANT CHANGE UP (ref 5–17)
AST SERPL-CCNC: 21 U/L — SIGNIFICANT CHANGE UP (ref 10–40)
BILIRUB SERPL-MCNC: 0.5 MG/DL — SIGNIFICANT CHANGE UP (ref 0.2–1.2)
BUN SERPL-MCNC: 28 MG/DL — HIGH (ref 7–23)
CALCIUM SERPL-MCNC: 9.3 MG/DL — SIGNIFICANT CHANGE UP (ref 8.4–10.5)
CHLORIDE SERPL-SCNC: 107 MMOL/L — SIGNIFICANT CHANGE UP (ref 96–108)
CO2 SERPL-SCNC: 22 MMOL/L — SIGNIFICANT CHANGE UP (ref 22–31)
CREAT SERPL-MCNC: 1.14 MG/DL — SIGNIFICANT CHANGE UP (ref 0.5–1.3)
GLUCOSE SERPL-MCNC: 221 MG/DL — HIGH (ref 70–99)
GRAM STN FLD: SIGNIFICANT CHANGE UP
HAPTOGLOB SERPL-MCNC: 60 MG/DL — SIGNIFICANT CHANGE UP (ref 34–200)
HBV CORE AB SER-ACNC: SIGNIFICANT CHANGE UP
HCT VFR BLD CALC: 32.4 % — LOW (ref 39–50)
HGB BLD-MCNC: 10.1 G/DL — LOW (ref 13–17)
MCHC RBC-ENTMCNC: 29.5 PG — SIGNIFICANT CHANGE UP (ref 27–34)
MCHC RBC-ENTMCNC: 31.2 GM/DL — LOW (ref 32–36)
MCV RBC AUTO: 94.6 FL — SIGNIFICANT CHANGE UP (ref 80–100)
PLATELET # BLD AUTO: 175 K/UL — SIGNIFICANT CHANGE UP (ref 150–400)
POTASSIUM SERPL-MCNC: 5.5 MMOL/L — HIGH (ref 3.5–5.3)
POTASSIUM SERPL-MCNC: 5.8 MMOL/L — HIGH (ref 3.5–5.3)
POTASSIUM SERPL-SCNC: 5.5 MMOL/L — HIGH (ref 3.5–5.3)
POTASSIUM SERPL-SCNC: 5.8 MMOL/L — HIGH (ref 3.5–5.3)
PROT SERPL-MCNC: 5.9 G/DL — LOW (ref 6–8.3)
RBC # BLD: 3.43 M/UL — LOW (ref 4.2–5.8)
RBC # FLD: 20.1 % — HIGH (ref 10.3–14.5)
SODIUM SERPL-SCNC: 139 MMOL/L — SIGNIFICANT CHANGE UP (ref 135–145)
SPECIMEN SOURCE: SIGNIFICANT CHANGE UP
TACROLIMUS SERPL-MCNC: 14.5 NG/ML — SIGNIFICANT CHANGE UP
WBC # BLD: 12.8 K/UL — HIGH (ref 3.8–10.5)
WBC # FLD AUTO: 12.8 K/UL — HIGH (ref 3.8–10.5)

## 2018-04-26 PROCEDURE — 90832 PSYTX W PT 30 MINUTES: CPT

## 2018-04-26 PROCEDURE — 99233 SBSQ HOSP IP/OBS HIGH 50: CPT

## 2018-04-26 RX ORDER — SODIUM BICARBONATE 1 MEQ/ML
1 SYRINGE (ML) INTRAVENOUS
Qty: 0 | Refills: 0 | COMMUNITY
Start: 2018-04-26

## 2018-04-26 RX ORDER — FUROSEMIDE 20 MG/1
20 TABLET ORAL DAILY
Refills: 0 | Status: DISCONTINUED | COMMUNITY
Start: 2018-04-19 | End: 2018-04-26

## 2018-04-26 RX ORDER — ENOXAPARIN SODIUM 100 MG/ML
80 INJECTION SUBCUTANEOUS
Qty: 0 | Refills: 0 | COMMUNITY
Start: 2018-04-26

## 2018-04-26 RX ORDER — MAGNESIUM OXIDE 400 MG ORAL TABLET 241.3 MG
1 TABLET ORAL
Qty: 0 | Refills: 0 | COMMUNITY
Start: 2018-04-26

## 2018-04-26 RX ORDER — MYCOPHENOLATE MOFETIL 250 MG/1
2 CAPSULE ORAL
Qty: 0 | Refills: 0 | COMMUNITY
Start: 2018-04-26

## 2018-04-26 RX ORDER — DOCUSATE SODIUM 100 MG
1 CAPSULE ORAL
Qty: 0 | Refills: 0 | COMMUNITY
Start: 2018-04-26

## 2018-04-26 RX ORDER — DILTIAZEM HCL 120 MG
1 CAPSULE, EXT RELEASE 24 HR ORAL
Qty: 0 | Refills: 0 | COMMUNITY
Start: 2018-04-26

## 2018-04-26 RX ORDER — VALGANCICLOVIR 450 MG/1
1 TABLET, FILM COATED ORAL
Qty: 0 | Refills: 0 | COMMUNITY
Start: 2018-04-26

## 2018-04-26 RX ORDER — ENOXAPARIN SODIUM 100 MG/ML
80 INJECTION SUBCUTANEOUS
Qty: 0 | Refills: 0 | Status: DISCONTINUED | OUTPATIENT
Start: 2018-04-26 | End: 2018-04-26

## 2018-04-26 RX ORDER — TACROLIMUS 5 MG/1
7 CAPSULE ORAL
Qty: 0 | Refills: 0 | Status: DISCONTINUED | OUTPATIENT
Start: 2018-04-26 | End: 2018-04-26

## 2018-04-26 RX ORDER — FAMOTIDINE 10 MG/ML
1 INJECTION INTRAVENOUS
Qty: 0 | Refills: 0 | COMMUNITY
Start: 2018-04-26

## 2018-04-26 RX ORDER — NYSTATIN 500MM UNIT
5 POWDER (EA) MISCELLANEOUS
Qty: 0 | Refills: 0 | COMMUNITY
Start: 2018-04-26

## 2018-04-26 RX ORDER — ATOVAQUONE 750 MG/5ML
10 SUSPENSION ORAL
Qty: 0 | Refills: 0 | COMMUNITY
Start: 2018-04-26

## 2018-04-26 RX ORDER — ASPIRIN/CALCIUM CARB/MAGNESIUM 324 MG
1 TABLET ORAL
Qty: 0 | Refills: 0 | COMMUNITY
Start: 2018-04-26

## 2018-04-26 RX ORDER — AMLODIPINE BESYLATE 2.5 MG/1
1 TABLET ORAL
Qty: 0 | Refills: 0 | COMMUNITY
Start: 2018-04-26

## 2018-04-26 RX ORDER — TACROLIMUS 5 MG/1
7 CAPSULE ORAL
Qty: 0 | Refills: 0 | COMMUNITY
Start: 2018-04-26

## 2018-04-26 RX ADMIN — MAGNESIUM OXIDE 400 MG ORAL TABLET 400 MILLIGRAM(S): 241.3 TABLET ORAL at 08:00

## 2018-04-26 RX ADMIN — Medication 500000 UNIT(S): at 17:45

## 2018-04-26 RX ADMIN — Medication 500000 UNIT(S): at 06:27

## 2018-04-26 RX ADMIN — Medication 81 MILLIGRAM(S): at 08:00

## 2018-04-26 RX ADMIN — Medication 650 MILLIGRAM(S): at 13:13

## 2018-04-26 RX ADMIN — Medication 500000 UNIT(S): at 11:11

## 2018-04-26 RX ADMIN — Medication 12.5 MILLIGRAM(S): at 08:03

## 2018-04-26 RX ADMIN — MYCOPHENOLATE MOFETIL 1000 MILLIGRAM(S): 250 CAPSULE ORAL at 08:00

## 2018-04-26 RX ADMIN — Medication 1 TABLET(S): at 08:00

## 2018-04-26 RX ADMIN — AMLODIPINE BESYLATE 5 MILLIGRAM(S): 2.5 TABLET ORAL at 06:27

## 2018-04-26 RX ADMIN — FAMOTIDINE 20 MILLIGRAM(S): 10 INJECTION INTRAVENOUS at 17:45

## 2018-04-26 RX ADMIN — Medication 500000 UNIT(S): at 00:53

## 2018-04-26 RX ADMIN — VALGANCICLOVIR 450 MILLIGRAM(S): 450 TABLET, FILM COATED ORAL at 08:01

## 2018-04-26 RX ADMIN — FAMOTIDINE 20 MILLIGRAM(S): 10 INJECTION INTRAVENOUS at 06:27

## 2018-04-26 RX ADMIN — TACROLIMUS 8 MILLIGRAM(S): 5 CAPSULE ORAL at 08:00

## 2018-04-26 RX ADMIN — ATOVAQUONE 1500 MILLIGRAM(S): 750 SUSPENSION ORAL at 11:11

## 2018-04-26 RX ADMIN — Medication 240 MILLIGRAM(S): at 06:27

## 2018-04-26 RX ADMIN — Medication 1 APPLICATION(S): at 06:28

## 2018-04-26 RX ADMIN — Medication 650 MILLIGRAM(S): at 06:27

## 2018-04-26 NOTE — PROGRESS NOTE ADULT - SUBJECTIVE AND OBJECTIVE BOX
Learner: Patient  Barriers: patient has difficulties to remember medications despite multiple sessions. Keep reinforcing     Patient received a cardiac transplant.    Method used: Verbal discussion and written materials were provided.    Medication safety was discussed with the patient: allergies, herbals, adherence, interactions, medication precautions, miss dose instructions, purpose, side effects, signs and symptoms to report, and storage & handling    Patient was able to repeat and verbalize key points.    Education Summary: Discharge immunosuppressant medications and prophylatic anti-infective agents reviewed with the patient. Outpatient medication schedule was discussed in detail including: medication name, indication, dose, administration times, treatment duration, side effects, drug interactions, and special instructions. Patient questions and concerns were answered and addressed. Patient demonstrated understanding. In addition, introduced the new medication Mavyret for the management of hepatitis C - patient was taught the purpose of the medication, side effects, and duration of the therapy. The patient understands the importance of taking medication and how to take it - "once a day with food". Once again, reinforced the medications that the patient has been taking. The pill box was reviewed by the coordinator.     The team was made aware regarding the potential drug-drug interactions - Mavyret and tacrolimus (might elevate tacrolimus levels; the dose was adjusted/lowered to 7 mg PO twice a day) in addition to diltiazem/Mavyret - potentially elevated diltiazem levels. The patient will be monitored, and the therapy will be adjusted. Tacrolimus level will be checked on Monday.     Time spent discharge education: 60 minutes    MEDICATIONS  (STANDING):  amLODIPine   Tablet 5 milliGRAM(s) Oral daily  aspirin enteric coated 81 milliGRAM(s) Oral daily  atovaquone Suspension 1500 milliGRAM(s) Oral daily  calcium citrate with vit D 315mg/250unit 2 Tablet(s) 2 Tablet(s) Oral two times a day  diltiazem    milliGRAM(s) Oral daily  enoxaparin Injectable 80 milliGRAM(s) SubCutaneous <User Schedule>  famotidine    Tablet 20 milliGRAM(s) Oral two times a day  magnesium oxide 400 milliGRAM(s) Oral <User Schedule>  multivitamin 1 Tablet(s) Oral daily  mycophenolate mofetil 1000 milliGRAM(s) Oral <User Schedule>  nystatin    Suspension [id number] Unit(s) Oral every 6 hours  pravastatin 20 milliGRAM(s) Oral at bedtime  predniSONE   Tablet 12.5 milliGRAM(s) Oral <User Schedule>  silver sulfADIAZINE 1% Cream 1 Application(s) Topical two times a day  sodium bicarbonate 650 milliGRAM(s) Oral three times a day  tacrolimus 7 milliGRAM(s) Oral <User Schedule>  valGANciclovir 450 milliGRAM(s) Oral <User Schedule>    MEDICATIONS  (PRN):  docusate sodium 100 milliGRAM(s) Oral three times a day PRN Constipation      Cardiac Transplant Medications:  Tacrolimus adjusted to trough (7 mg PO every 12 hours; the dose was adjusted from 8 mg PO every 12 hours to minimize the DDI with new Hepatitis C treatment)  Mycophenolate mofetil 1,000 mg PO twice a day  Prednisone taper (12.5 mg PO every 12 hours)  Atovaquone 1,500 mg PO daily  Nystatin 500,000 units 5 mL swish and swallow four times a day  Valganciclovir 450 mG PO every 12 hours   Docusate 100 mG 1 capsule PO 3 times a day   Famotidine 20 mg 1 tablet PO daily                         10.1   12.8  )-----------( 175      ( 26 Apr 2018 01:27 )             32.4     04-26    x   |  x   |  x   ----------------------------<  x   5.5<H>   |  x   |  x     Ca    9.3      26 Apr 2018 01:27  Phos  3.2     04-25  Mg     2.2     04-25    TPro  5.9<L>  /  Alb  4.3  /  TBili  0.5  /  DBili  x   /  AST  21  /  ALT  22  /  AlkPhos  39<L>  04-26    LIVER FUNCTIONS - ( 26 Apr 2018 01:27 )  Alb: 4.3 g/dL / Pro: 5.9 g/dL / ALK PHOS: 39 U/L / ALT: 22 U/L / AST: 21 U/L / GGT: x           Tacrolimus (), Serum: 14.5 ng/mL (04-26-18 @ 07:29)  Tacrolimus (), Serum: 14.9 ng/mL (04-25-18 @ 07:40)  Tacrolimus (), Serum: 15.2 ng/mL (04-24-18 @ 07:41)

## 2018-04-26 NOTE — CONSULT NOTE ADULT - PROBLEM SELECTOR RECOMMENDATION 9
in the setting of tacrolimus use. Pts renal fn stable, and had been taken off Bactrim. Given kayexalate, lasix, and on sodium bicarb tabs. Serum potassium still remains elevated to 5s.   -   - Needs to stay on low K diet. in the setting of tacrolimus use. Pts renal fn stable, and had been taken off Bactrim. Given kayexalate, lasix, and on sodium bicarb tabs. Serum potassium still remains elevated to 5s.   - Suggest to start pt on florinef 0.1mg PO daily  - Needs to stay on low K diet.  - Pt to f/u with Renal (Dr. Hutchins) on May 3rd at 10AM (appointment made), at [address] [place] [address].

## 2018-04-26 NOTE — DISCHARGE NOTE ADULT - CARE PROVIDER_API CALL
Ti Parker), Thoracic and Cardiac Surgery  [address] [place] [address]  Phone: [phone]  Fax: [phone]    Sujey Lujan), Infectious Disease; Internal Medicine  [address] [place] [address]  Phone: [phone]  Fax: [phone]    Leola Spence), Internal Medicine; Nephrology  [address] [facility]  [address] [place] [address]  Phone: [phone]  Fax: [phone]

## 2018-04-26 NOTE — PROGRESS NOTE ADULT - SUBJECTIVE AND OBJECTIVE BOX
Hepatitis C Medication Counseling Session - Mavyret (Glecaprevir and Pibrentasvir)    Reviewed How to take medication:   Mavyret should be taken 3 tablet once a day in morning at 9am with food     Reviewed Side Effects of medication:   headache and fatigue.  Reviewed with patient when to call the office when side effect happen.     Patient should take Mavyret should be taken everyday. Patient should not consume alcohol and abstain from illicit drug use. Reviewed drug interactions. Patient should avoid herbal supplements and grapefruit juice due to drug interactions.  Informed patient to bring medication when traveling or if patients ends up hospitalized since medication is not readily available.     Drug interaction with patient's current medication and Mavyret are listed below:   ·	Mavyret (Glecaprevir and Pibrentasvir)  –  Tacrolimus (Systemic)  Glecaprevir and Pibrentasvir may increase the serum concentration of Tacrolimus.  Patient's Tacrolimus level will be closely monitored  ·	CellCept (Mycophenolate)  –  Magnesium Citrate (Magnesium Salts)   Magnesium Salts may decrease the serum concentration of Mycophenolate.  Patient should take mycophenolate and magnesium separately at least 4 hours apart from another.   ·	Mavyret (Glecaprevir and Pibrentasvir)  –  Pravachol (HMG-CoA Reductase Inhibitors (Statins))   Mavyret may increase the serum concentration of Pravachol  HMG-CoA Reductase Inhibitors (Statins)  Patient is on a low dose of Pravachol 20mg.  Will inform patient of side effect - unexplained muscle pain.      Patient received medication from : [facility] [phone]  Follow up labs the week of:5/7/18 and 5/21/18  and 6/25/18 and 9/24/18  (Quantitative HCV viral load, CBC, CMP)    Patient will start Mavyret on 4/47/18 and will complete the 8 week of treatment on 6/22/18    Patient will know if he is cured of hepatitis C the week of September 24th 2018.  The quantitative HCV viral load should be done the week of 9/17/18.     Mavyret (Glecaprevir and Pibrentasvir) copay of  $0.00    Samantha James, RomieD BCPS CDE  Specialist Pharmacist  [facility]  [place] [address]  Telephone: [phone]  Fax: [phone] Hepatitis C Medication Counseling Session - Mavyret (Glecaprevir and Pibrentasvir)    Reviewed How to take medication:   Mavyret should be taken 3 tablet once a day in morning at 9am with food     Reviewed Side Effects of medication:   headache and fatigue.  Reviewed with patient when to call the office when side effect happen.     Patient should take Mavyret should be taken everyday. Patient should not consume alcohol and abstain from illicit drug use. Reviewed drug interactions. Patient should avoid herbal supplements and grapefruit juice due to drug interactions.  Informed patient to bring medication when traveling or if patients ends up hospitalized since medication is not readily available.     No Known Allergies  Current list:     amLODIPine   Tablet 5 milliGRAM(s) Oral daily  aspirin enteric coated 81 milliGRAM(s) Oral daily  atovaquone Suspension 1500 milliGRAM(s) Oral daily  calcium citrate with vit D 315mg/250unit 2 Tablet(s) 2 Tablet(s) Oral two times a day  diltiazem    milliGRAM(s) Oral daily  docusate sodium 100 milliGRAM(s) Oral three times a day PRN  enoxaparin Injectable 80 milliGRAM(s) SubCutaneous <User Schedule>  famotidine    Tablet 20 milliGRAM(s) Oral two times a day  magnesium oxide 400 milliGRAM(s) Oral <User Schedule>  multivitamin 1 Tablet(s) Oral daily  mycophenolate mofetil 1000 milliGRAM(s) Oral <User Schedule>  nystatin    Suspension 659424 Unit(s) Oral every 6 hours  pravastatin 20 milliGRAM(s) Oral at bedtime  predniSONE   Tablet 12.5 milliGRAM(s) Oral <User Schedule>  silver sulfADIAZINE 1% Cream 1 Application(s) Topical two times a day  sodium bicarbonate 650 milliGRAM(s) Oral three times a day  tacrolimus 7 milliGRAM(s) Oral <User Schedule>  valGANciclovir 450 milliGRAM(s) Oral <User Schedule>    Drug interaction with patient's current medication and Mavyret are listed below:   ·	Mavyret (Glecaprevir and Pibrentasvir)  –  Tacrolimus (Systemic)  Glecaprevir and Pibrentasvir may increase the serum concentration of Tacrolimus.  Patient's Tacrolimus level will be closely monitored  ·	CellCept (Mycophenolate)  –  Magnesium Citrate (Magnesium Salts)   Magnesium Salts may decrease the serum concentration of Mycophenolate.  Patient should take mycophenolate and magnesium separately at least 4 hours apart from another.   ·	Mavyret (Glecaprevir and Pibrentasvir)  –  Pravachol (HMG-CoA Reductase Inhibitors (Statins))   Mavyret may increase the serum concentration of Pravachol  HMG-CoA Reductase Inhibitors (Statins)  Patient is on a low dose of Pravachol 20mg.  Will inform patient of side effect - unexplained muscle pain.      Patient received medication from : [facility] [phone]  Follow up labs the week of:5/7/18 and 5/21/18  and 6/25/18 and 9/24/18  (Quantitative HCV viral load, CBC, CMP)    Patient will start Mavyret on 4/47/18 and will complete the 8 week of treatment on 6/22/18    Patient will know if he is cured of hepatitis C the week of September 24th 2018.  The quantitative HCV viral load should be done the week of 9/17/18.     Mavyret (Glecaprevir and Pibrentasvir) copay of  $0.00    Samantha James, PharmD BCPS CDE  Specialist Pharmacist  [facility]  Medicine Service [facility] [place] [place] [address]  Telephone: [phone]  Fax: [phone] Hepatitis C Medication Counseling Session - Mavyret (Glecaprevir and Pibrentasvir)    Reviewed How to take medication:   Mavyret should be taken 3 tablet once a day in morning at 9am with food     Reviewed Side Effects of medication:   headache and fatigue.  Reviewed with patient when to call the office when side effect happen.     Patient should take Mavyret should be taken everyday. Patient should not consume alcohol and abstain from illicit drug use. Reviewed drug interactions. Patient should avoid herbal supplements and grapefruit juice due to drug interactions.  Informed patient to bring medication when traveling or if patients ends up hospitalized since medication is not readily available.     No Known Allergies  Current list:     amLODIPine   Tablet 5 milliGRAM(s) Oral daily  aspirin enteric coated 81 milliGRAM(s) Oral daily  atovaquone Suspension 1500 milliGRAM(s) Oral daily  calcium citrate with vit D 315mg/250unit 2 Tablet(s) 2 Tablet(s) Oral two times a day  diltiazem    milliGRAM(s) Oral daily  docusate sodium 100 milliGRAM(s) Oral three times a day PRN  enoxaparin Injectable 80 milliGRAM(s) SubCutaneous <User Schedule>  famotidine    Tablet 20 milliGRAM(s) Oral two times a day  magnesium oxide 400 milliGRAM(s) Oral <User Schedule>  multivitamin 1 Tablet(s) Oral daily  mycophenolate mofetil 1000 milliGRAM(s) Oral <User Schedule>  nystatin    Suspension 636023 Unit(s) Oral every 6 hours  pravastatin 20 milliGRAM(s) Oral at bedtime  predniSONE   Tablet 12.5 milliGRAM(s) Oral <User Schedule>  silver sulfADIAZINE 1% Cream 1 Application(s) Topical two times a day  sodium bicarbonate 650 milliGRAM(s) Oral three times a day  tacrolimus 7 milliGRAM(s) Oral <User Schedule>  valGANciclovir 450 milliGRAM(s) Oral <User Schedule>    Drug interaction with patient's current medication and Mavyret are listed below:   ·	Mavyret (Glecaprevir and Pibrentasvir)  –  Tacrolimus (Systemic)  Glecaprevir and Pibrentasvir may increase the serum concentration of Tacrolimus.  Patient's Tacrolimus level will be closely monitored  ·	CellCept (Mycophenolate)  –  Magnesium Citrate (Magnesium Salts)   Magnesium Salts may decrease the serum concentration of Mycophenolate.  Patient should take mycophenolate and magnesium separately at least 4 hours apart from another.   ·	Mavyret (Glecaprevir and Pibrentasvir)  –  Pravachol (HMG-CoA Reductase Inhibitors (Statins))   Mavyret may increase the serum concentration of Pravachol  HMG-CoA Reductase Inhibitors (Statins)  Patient is on a low dose of Pravachol 20mg.  Will inform patient of side effect - unexplained muscle pain.      Patient received medication from : [facility] [phone]  Follow up labs the week of:5/7/18 and 5/21/18  and 6/25/18 and 9/24/18  (Quantitative HCV viral load, CBC, CMP)    Patient will start Mavyret on 4/47/18 and will complete the 8 week of treatment on 6/22/18    Patient will know if he is cured of hepatitis C the week of September 24th 2018.  The quantitative HCV viral load should be done the week of 9/17/18.     Mavyret (Glecaprevir and Pibrentasvir) copay of  $0.00    Samantha James, PharmD BCPS CDE  Specialist Pharmacist  [facility]  Medicine Service [facility] [place] [address]  Telephone: [phone]  Fax: [phone] Hepatitis C Medication Counseling Session - Mavyret (Glecaprevir and Pibrentasvir)  Patient has hepatitis C  genotype 1a.  Patient liver is non cirrhotic via CT abdomen.  Patient's last hepatitis C viral load was 4/19/18 42,855 IU/ml     Reviewed How to take medication:   Mavyret should be taken 3 tablet once a day in morning at 9am with food     Reviewed Side Effects of medication:   headache and fatigue.  Reviewed with patient when to call the office when side effect happen.     Patient should take Mavyret should be taken everyday. Patient should not consume alcohol and abstain from illicit drug use. Reviewed drug interactions. Patient should avoid herbal supplements and grapefruit juice due to drug interactions.  Informed patient to bring medication when traveling or if patients ends up hospitalized since medication is not readily available.     No Known Allergies  Current list:     amLODIPine   Tablet 5 milliGRAM(s) Oral daily  aspirin enteric coated 81 milliGRAM(s) Oral daily  atovaquone Suspension 1500 milliGRAM(s) Oral daily  calcium citrate with vit D 315mg/250unit 2 Tablet(s) 2 Tablet(s) Oral two times a day  diltiazem    milliGRAM(s) Oral daily  docusate sodium 100 milliGRAM(s) Oral three times a day PRN  enoxaparin Injectable 80 milliGRAM(s) SubCutaneous <User Schedule>  famotidine    Tablet 20 milliGRAM(s) Oral two times a day  magnesium oxide 400 milliGRAM(s) Oral <User Schedule>  multivitamin 1 Tablet(s) Oral daily  mycophenolate mofetil 1000 milliGRAM(s) Oral <User Schedule>  nystatin    Suspension 595787 Unit(s) Oral every 6 hours  pravastatin 20 milliGRAM(s) Oral at bedtime  predniSONE   Tablet 12.5 milliGRAM(s) Oral <User Schedule>  silver sulfADIAZINE 1% Cream 1 Application(s) Topical two times a day  sodium bicarbonate 650 milliGRAM(s) Oral three times a day  tacrolimus 7 milliGRAM(s) Oral <User Schedule>  valGANciclovir 450 milliGRAM(s) Oral <User Schedule>    Drug interaction with patient's current medication and Mavyret are listed below:   ·	Mavyret (Glecaprevir and Pibrentasvir)  –  Tacrolimus (Systemic)  Glecaprevir and Pibrentasvir may increase the serum concentration of Tacrolimus.  Patient's Tacrolimus level will be closely monitored  ·	CellCept (Mycophenolate)  –  Magnesium Citrate (Magnesium Salts)   Magnesium Salts may decrease the serum concentration of Mycophenolate.  Patient should take mycophenolate and magnesium separately at least 4 hours apart from another.   ·	Mavyret (Glecaprevir and Pibrentasvir)  –  Pravachol (HMG-CoA Reductase Inhibitors (Statins))   Mavyret may increase the serum concentration of Pravachol  HMG-CoA Reductase Inhibitors (Statins)  Patient is on a low dose of Pravachol 20mg.  Will inform patient of side effect - unexplained muscle pain.      Patient received medication from : [facility] [phone]  Follow up labs the week of:5/7/18 and 5/21/18  and 6/25/18 and 9/24/18  (Quantitative HCV viral load, CBC, CMP)    Patient will start Mavyret on 4/47/18 and will complete the 8 week of treatment on 6/22/18    Patient will know if he is cured of hepatitis C the week of September 24th 2018.  The quantitative HCV viral load should be done the week of 9/17/18.     Mavyret (Glecaprevir and Pibrentasvir) copay of  $0.00    Samantha James, PharmD BCPS CDE  Specialist Pharmacist  [facility]  [place] [address]  Telephone: [phone]  Fax: [phone]

## 2018-04-26 NOTE — DISCHARGE NOTE ADULT - CARE PLAN
Principal Discharge DX:	Heart transplant recipient  Goal:	Optimize cardiovascular health  Assessment and plan of treatment:	Continue with medications as prescribed.

## 2018-04-26 NOTE — DISCHARGE NOTE ADULT - PATIENT PORTAL LINK FT
You can access the [facility] Patient Portal, offered by [facility], by registering with the following website: [email or web address]

## 2018-04-26 NOTE — DISCHARGE NOTE ADULT - ADDITIONAL INSTRUCTIONS
B cell flow, chemistry, prograf level monday  2nd dose ritoxan one week.  repeat biopsy 2 weeks.  (UUE duplex )   outpatient apt Dr Thomas over next 10 days.  Mavyret to be started tomorrow. Follow up with appointments as per Transplant team and Transplant coordinator, Anum Campos.

## 2018-04-26 NOTE — CONSULT NOTE ADULT - SUBJECTIVE AND OBJECTIVE BOX
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    Pt returned for RHC and biopsy.   Found to have graft dysfunction, thought 2/2 AMR.  Started on plasmapheresis and IVIG on 4/20.  Also received Rituximab.  Graft function has improved.    Renal called for pts persistent hyperkalemia.  Pt has been placed on low K diet.  Has been given kayexalate, lasix, and on sodium bicarb tabs.  Tacro dose was decreased.    However K still elevated.     PAST HISTORY  --------------------------------------------------------------------------------  PAST MEDICAL & SURGICAL HISTORY:  DVT of upper extremity (deep vein thrombosis)  Hepatitis C virus  GIB (gastrointestinal bleeding)  Ventricular fibrillation: s/p AICD  PAF (paroxysmal atrial fibrillation): on xarelto  Non-Ischemic Cardiomyopathy  SVT (Supraventricular Tachycardia)  HTN  CHF (Congestive Heart Failure)  H/O heart transplant: 2/2018  LVAD (left ventricular assist device) present  Status post left hip replacement  History of Prior Ablation Treatment: for afib  AICD (Automatic Cardioverter/Defibrillator) Present: [employer] with 1 [employer] lead4/1/09- explanted and replaced with Medtronic 2 leads on 9/2/09    FAMILY HISTORY:  No pertinent family history in first degree relatives    PAST SOCIAL HISTORY:    ALLERGIES & MEDICATIONS  --------------------------------------------------------------------------------  Allergies    No Known Allergies    Intolerances      Standing Inpatient Medications  amLODIPine   Tablet 5 milliGRAM(s) Oral daily  aspirin enteric coated 81 milliGRAM(s) Oral daily  atovaquone Suspension 1500 milliGRAM(s) Oral daily  calcium citrate with vit D 315mg/250unit 2 Tablet(s) 2 Tablet(s) Oral two times a day  diltiazem    milliGRAM(s) Oral daily  famotidine    Tablet 20 milliGRAM(s) Oral two times a day  magnesium oxide 400 milliGRAM(s) Oral <User Schedule>  multivitamin 1 Tablet(s) Oral daily  mycophenolate mofetil 1000 milliGRAM(s) Oral <User Schedule>  nystatin    Suspension 916054 Unit(s) Oral every 6 hours  pravastatin 20 milliGRAM(s) Oral at bedtime  predniSONE   Tablet 12.5 milliGRAM(s) Oral <User Schedule>  silver sulfADIAZINE 1% Cream 1 Application(s) Topical two times a day  sodium bicarbonate 650 milliGRAM(s) Oral three times a day  tacrolimus 8 milliGRAM(s) Oral <User Schedule>  valGANciclovir 450 milliGRAM(s) Oral <User Schedule>    PRN Inpatient Medications  docusate sodium 100 milliGRAM(s) Oral three times a day PRN      REVIEW OF SYSTEMS  --------------------------------------------------------------------------------  Gen: No weight changes  Skin: No rashes  Head/Eyes/Ears/Mouth: No headache  Respiratory: No dyspnea, cough  CV: No chest pain  GI: No abdominal pain, diarrhea  : No increased frequency  MSK: No edema  Neuro: No dizziness  Heme: No easy bruising or bleeding  Endo: No heat/cold intolerance  Psych: No significant nervousness    VITALS/PHYSICAL EXAM  --------------------------------------------------------------------------------  T(C): 36.4 (04-26-18 @ 12:00), Max: 36.6 (04-25-18 @ 14:30)  HR: 112 (04-26-18 @ 13:00) (93 - 112)  BP: 116/68 (04-26-18 @ 13:00) (108/69 - 130/95)  RR: 26 (04-26-18 @ 12:00) (13 - 36)  SpO2: 100% (04-26-18 @ 12:00) (98% - 100%)  Wt(kg): --        04-25-18 @ 07:01  -  04-26-18 @ 07:00  --------------------------------------------------------  IN: 1380 mL / OUT: 1325 mL / NET: 55 mL    04-26-18 @ 07:01  -  04-26-18 @ 13:38  --------------------------------------------------------  IN: 200 mL / OUT: 700 mL / NET: -500 mL      Physical Exam:  	Gen: NAD, well-appearing  	HEENT: anicteric  	Pulm: CTA B/L  	CV: RRR, S1S2; no rub  	Back: No spinal or CVA tenderness  	Abd: +BS, soft, nontender/nondistended  	: No suprapubic tenderness  	UE: Warm, no edema  	LE: Warm, no edema  	Neuro: follows commands  	Psych: Normal affect and mood  	Skin: Warm, without rashes    LABS/STUDIES  --------------------------------------------------------------------------------              10.1   12.8  >-----------<  175      [04-26-18 @ 01:27]              32.4     x   |  x   |  x   ----------------------------<  x       [04-26-18 @ 07:01]  5.5   |  x   |  x         Ca     9.3     [04-26-18 @ 01:27]      Mg     2.2     [04-25-18 @ 03:53]      Phos  3.2     [04-25-18 @ 03:53]    TPro  5.9  /  Alb  4.3  /  TBili  0.5  /  DBili  x   /  AST  21  /  ALT  22  /  AlkPhos  39  [04-26-18 @ 01:27]              [04-25-18 @ 03:53]    Creatinine Trend:  SCr 1.14 [04-26 @ 01:27]  SCr 1.11 [04-25 @ 06:52]  SCr 1.11 [04-25 @ 03:53]  SCr 1.48 [04-24 @ 01:09]  SCr 1.37 [04-23 @ 16:26]    Urinalysis - [03-23-18 @ 16:03]      Color Yellow / Appearance Slightly Turbid / SG 1.020 / pH 5.0      Gluc Negative / Ketone Negative  / Bili Negative / Urobili Negative       Blood Small / Protein 30 / Leuk Est Small / Nitrite Negative      RBC 5 / WBC 8 / Hyaline 0 / Gran  / Sq Epi  / Non Sq Epi 4 / Bacteria Few      Iron 22, TIBC 182, %sat 12      [02-13-18 @ 12:44]  Ferritin 79.0      [02-13-18 @ 12:44]  HbA1c 5.3      [03-18-18 @ 11:20]  TSH 1.48      [02-27-18 @ 08:13]  Lipid: chol 62, TG 33, HDL 17, LDL 38      [06-07-17 @ 06:14]    HBsAb Nonreact      [05-15-17 @ 15:40]  HBsAg Nonreact      [04-25-18 @ 15:20]  HBcAb Nonreact      [05-15-17 @ 15:40]  HCV 0.21, Nonreact      [04-25-18 @ 15:20]  HIV Nonreact      [02-02-18 @ 19:25]    C3 Complement 135      [03-19-18 @ 15:13]  C4 Complement 37      [03-19-18 @ 15:13]  Free Light Chains: kappa 4.36, lambda 5.71, ratio = 0.76      [03-19 @ 15:13]  Immunofixation Serum:   No Monoclonal Band Identified      [03-19-18 @ 15:13]  SPEP Interpretation: Hypoalbuminemia.      [06-27-17 @ 15:50]  Cryoglobulin: Negative      [03-19-18 @ 15:13] 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    Pt returned for RHC and biopsy.   Found to have graft dysfunction, thought 2/2 AMR.  Started on plasmapheresis and IVIG on 4/20.  Also received Rituximab.  Graft function has improved.    Renal called for pts persistent hyperkalemia.  Pt has been placed on low K diet.  Has been given kayexalate, lasix, and on sodium bicarb tabs.  Tacro dose was decreased.    However K still elevated.     PAST HISTORY  --------------------------------------------------------------------------------  PAST MEDICAL & SURGICAL HISTORY:  DVT of upper extremity (deep vein thrombosis)  Hepatitis C virus  GIB (gastrointestinal bleeding)  Ventricular fibrillation: s/p AICD  PAF (paroxysmal atrial fibrillation): on xarelto  Non-Ischemic Cardiomyopathy  SVT (Supraventricular Tachycardia)  HTN  CHF (Congestive Heart Failure)  H/O heart transplant: 2/2018  LVAD (left ventricular assist device) present  Status post left hip replacement  History of Prior Ablation Treatment: for afib  AICD (Automatic Cardioverter/Defibrillator) Present: [employer] with 1 [employer] lead4/1/09- explanted and replaced with Medtronic 2 leads on 9/2/09    FAMILY HISTORY:  No pertinent family history in first degree relatives    PAST SOCIAL HISTORY:    ALLERGIES & MEDICATIONS  --------------------------------------------------------------------------------  Allergies    No Known Allergies    Intolerances      Standing Inpatient Medications  amLODIPine   Tablet 5 milliGRAM(s) Oral daily  aspirin enteric coated 81 milliGRAM(s) Oral daily  atovaquone Suspension 1500 milliGRAM(s) Oral daily  calcium citrate with vit D 315mg/250unit 2 Tablet(s) 2 Tablet(s) Oral two times a day  diltiazem    milliGRAM(s) Oral daily  famotidine    Tablet 20 milliGRAM(s) Oral two times a day  magnesium oxide 400 milliGRAM(s) Oral <User Schedule>  multivitamin 1 Tablet(s) Oral daily  mycophenolate mofetil 1000 milliGRAM(s) Oral <User Schedule>  nystatin    Suspension 021353 Unit(s) Oral every 6 hours  pravastatin 20 milliGRAM(s) Oral at bedtime  predniSONE   Tablet 12.5 milliGRAM(s) Oral <User Schedule>  silver sulfADIAZINE 1% Cream 1 Application(s) Topical two times a day  sodium bicarbonate 650 milliGRAM(s) Oral three times a day  tacrolimus 8 milliGRAM(s) Oral <User Schedule>  valGANciclovir 450 milliGRAM(s) Oral <User Schedule>    PRN Inpatient Medications  docusate sodium 100 milliGRAM(s) Oral three times a day PRN      REVIEW OF SYSTEMS  --------------------------------------------------------------------------------  Gen: No weight changes  Skin: No rashes  Head/Eyes/Ears/Mouth: No headache  Respiratory: No dyspnea, cough  CV: No chest pain  GI: No abdominal pain, diarrhea  : No increased frequency  MSK: No edema  Neuro: No dizziness  Heme: No easy bruising or bleeding  Endo: No heat/cold intolerance  Psych: No significant nervousness    VITALS/PHYSICAL EXAM  --------------------------------------------------------------------------------  T(C): 36.4 (04-26-18 @ 12:00), Max: 36.6 (04-25-18 @ 14:30)  HR: 112 (04-26-18 @ 13:00) (93 - 112)  BP: 116/68 (04-26-18 @ 13:00) (108/69 - 130/95)  RR: 26 (04-26-18 @ 12:00) (13 - 36)  SpO2: 100% (04-26-18 @ 12:00) (98% - 100%)  Wt(kg): --        04-25-18 @ 07:01  -  04-26-18 @ 07:00  --------------------------------------------------------  IN: 1380 mL / OUT: 1325 mL / NET: 55 mL    04-26-18 @ 07:01  -  04-26-18 @ 13:38  --------------------------------------------------------  IN: 200 mL / OUT: 700 mL / NET: -500 mL      Physical Exam:  	Gen: NAD, well-appearing AAM  	HEENT: anicteric  	Pulm: CTA B/L  	CV: RRR, S1S2; no rub  	Back: No spinal or CVA tenderness  	Abd: +BS, soft, nontender/nondistended  	: No suprapubic tenderness  	UE: Warm, no edema  	LE: Warm, no edema  	Neuro: follows commands  	Psych: Normal affect and mood  	Skin: Warm, without rashes    LABS/STUDIES  --------------------------------------------------------------------------------              10.1   12.8  >-----------<  175      [04-26-18 @ 01:27]              32.4     x   |  x   |  x   ----------------------------<  x       [04-26-18 @ 07:01]  5.5   |  x   |  x         Ca     9.3     [04-26-18 @ 01:27]      Mg     2.2     [04-25-18 @ 03:53]      Phos  3.2     [04-25-18 @ 03:53]    TPro  5.9  /  Alb  4.3  /  TBili  0.5  /  DBili  x   /  AST  21  /  ALT  22  /  AlkPhos  39  [04-26-18 @ 01:27]              [04-25-18 @ 03:53]    Creatinine Trend:  SCr 1.14 [04-26 @ 01:27]  SCr 1.11 [04-25 @ 06:52]  SCr 1.11 [04-25 @ 03:53]  SCr 1.48 [04-24 @ 01:09]  SCr 1.37 [04-23 @ 16:26]    Urinalysis - [03-23-18 @ 16:03]      Color Yellow / Appearance Slightly Turbid / SG 1.020 / pH 5.0      Gluc Negative / Ketone Negative  / Bili Negative / Urobili Negative       Blood Small / Protein 30 / Leuk Est Small / Nitrite Negative      RBC 5 / WBC 8 / Hyaline 0 / Gran  / Sq Epi  / Non Sq Epi 4 / Bacteria Few      Iron 22, TIBC 182, %sat 12      [02-13-18 @ 12:44]  Ferritin 79.0      [02-13-18 @ 12:44]  HbA1c 5.3      [03-18-18 @ 11:20]  TSH 1.48      [02-27-18 @ 08:13]  Lipid: chol 62, TG 33, HDL 17, LDL 38      [06-07-17 @ 06:14]    HBsAb Nonreact      [05-15-17 @ 15:40]  HBsAg Nonreact      [04-25-18 @ 15:20]  HBcAb Nonreact      [05-15-17 @ 15:40]  HCV 0.21, Nonreact      [04-25-18 @ 15:20]  HIV Nonreact      [02-02-18 @ 19:25]    C3 Complement 135      [03-19-18 @ 15:13]  C4 Complement 37      [03-19-18 @ 15:13]  Free Light Chains: kappa 4.36, lambda 5.71, ratio = 0.76      [03-19 @ 15:13]  Immunofixation Serum:   No Monoclonal Band Identified      [03-19-18 @ 15:13]  SPEP Interpretation: Hypoalbuminemia.      [06-27-17 @ 15:50]  Cryoglobulin: Negative      [03-19-18 @ 15:13]

## 2018-04-26 NOTE — PROGRESS NOTE ADULT - PROVIDER SPECIALTY LIST ADULT
Critical Care
Critical Care
Gastroenterology
Infectious Disease
Pharmacy
Pharmacy
Psychiatry
Psychiatry
Transfusion Medicine
Transfusion Medicine
Transplant Medicine
Psychiatry

## 2018-04-26 NOTE — CONSULT NOTE ADULT - ASSESSMENT
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 Found to have graft dysfunction, thought 2/2 AMR.  Started on plasmapheresis and IVIG on 4/20. Also received Rituximab. Graft function has improved. Renal called for hyperK.

## 2018-04-26 NOTE — PROGRESS NOTE ADULT - ASSESSMENT
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 Found to have b/l IJ/subclavian thrombi as well at that time and was treated with ongoing AC. 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    OHT 2/2018  # s/p OHT now with graft dysfunction - He had his PP/IVIg s/p 2 treatments with the 3rd treatment planned for today. His TTE yesterdayshows improved LVEF to 57% today suggesting his initial issue was graft dysfunction. Tacro level continues to be elevated.   -c/w tacrolimus 8 mg BID with food tomorrow  -Steroids - c/w prednisone 12.5  mg po BID   -CellCept - continue 1000 mg PO BID.  -plan for IVIG # 3 treatment yesterday. Rituximab today.   	  # Antimicrobial prophylaxis:  Intermediate risk CMV - c/w Valcyte to 450 mg BID. Routine CMV PCR testing was negative on 4/19.  Intermediate risk Toxo - continue Mepron 1500mg PO daily with food.  PCP - continue Mepron 1500mg PO daily  Thrush - continue Nystatin S/S 5 mL QID until prednisone < 10 mg po BID.    # Additional treatments:  - Citracal + D 2 tabs PO BID  - multivitamin 1 tab daily  - Protonix 40 mg po daily for stress ulcer prophylaxis while on steroids    # Bilateral IJ/Subclavian thrombi -   - Will hold AC until he completes plasmapheresis as it depletes clotting factors. He was supposed to complete a 3 month course for UE DVT.  - CHERIE negative on last admission    # CAV PPx  - Continue ECASA 81mg PO daily  - Continue pravastatin 20mg PO QHS    # Hypertension  - He is currently on cardizem and norvasc for his BP control. Given the possibility that this could be non-HLA rejection, it would be ideal to use losartan instead of norvasc for BP control if his potassium level is under better control.  Continue current therapy for now.     # ID   - DLES improved and wound vac in place. Dressing changes per protocol of CTU. Off antibiotics.  - Right 3rd digit ulceration improving. Review if silver sulfadiazine dressings still required by plastics.   - HCV viral load by PCR 4/19 was 42,855. Therapy to be initiated by Dr. Thomas (Hepatology) after discharge.  - Transplant ID following    # endo  - SSI AC & HS    # Hyperkalemia - He received one dose of Kayexalate and K is now stable. No further treatment needed at this time. Low K diet, and will press forward wiwth adjust ment of tacrolimus.     Please call me at [phone] for any further questions up till 5 pm. For after hours, please call the covering cardiology [place] [phone] for any further assistance. 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 Found to have b/l IJ/subclavian thrombi as well at that time and was treated with ongoing AC. 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    OHT 2/2018  # s/p OHT now with graft dysfunction - He had his PP/IVIg s/p 3 treatments. His TTE shows improved LVEF to 57% today suggesting his initial issue was graft dysfunction. Tacro level continues to be elevated.   -c/w tacrolimus 8 mg BID with food  -Steroids - c/w prednisone 12.5  mg po BID   -CellCept - continue 1000 mg PO BID.  -Plan for IVIG # 3 treatment yesterday. Rituximab today.   	  # Antimicrobial prophylaxis:  Intermediate risk CMV - c/w Valcyte to 450 mg BID. Routine CMV PCR testing was negative on 4/19.  Intermediate risk Toxo - continue Mepron 1500mg PO daily with food.  PCP - continue Mepron 1500mg PO daily  Thrush - continue Nystatin S/S 5 mL QID until prednisone < 10 mg po BID.    # Additional treatments:  - Citracal + D 2 tabs PO BID  - multivitamin 1 tab daily  - Protonix 40 mg po daily for stress ulcer prophylaxis while on steroids    # Bilateral IJ/Subclavian thrombi -   - Will hold AC until he completes plasmapheresis as it depletes clotting factors. He was supposed to complete a 3 month course for UE DVT.  - CHERIE negative on last admission    # CAV PPx  - Continue ECASA 81mg PO daily  - Continue pravastatin 20mg PO QHS    # Hypertension  - He is currently on cardizem and norvasc for his BP control. Given the possibility that this could be non-HLA rejection, it would be ideal to use losartan instead of norvasc for BP control if his potassium level is under better control.  Continue current therapy for now.     # ID   - DLES improved and wound vac in place. Dressing changes per protocol of CTU. Off antibiotics.  - Right 3rd digit ulceration improving. Review if silver sulfadiazine dressings still required by plastics.   - HCV viral load by PCR 4/19 was 42,855. Therapy to be initiated by Dr. Thomas (Hepatology) after discharge.  - Transplant ID following    # endo  - SSI AC & HS    # Hyperkalemia - He received one dose of Kayexalate and K is now stable. No further treatment needed at this time. Low K diet, and will press forward wiwth adjust ment of tacrolimus.     Please call [phone] for further questions up to 5 pm. For after hours, please call the covering cardiology [place] fellow [place] [phone] for any further assistance.

## 2018-04-26 NOTE — DISCHARGE NOTE ADULT - MEDICATION SUMMARY - MEDICATIONS TO CHANGE
I will SWITCH the dose or number of times a day I take the medications listed below when I get home from the hospital:    famotidine 20 mg oral tablet  -- 1 tab(s) by mouth once a day    predniSONE 5 mg oral tablet  -- 3 tab(s) by mouth 2 times a day   -- It is very important that you take or use this exactly as directed.  Do not skip doses or discontinue unless directed by your doctor.  Obtain medical advice before taking any non-prescription drugs as some may affect the action of this medication.  Take with food or milk.    tacrolimus 1 mg oral capsule  -- 10 cap(s) by mouth once a day (at bedtime)   -- Avoid grapefruit and grapefruit juice while taking this medication.  It is very important that you take or use this exactly as directed.  Do not skip doses or discontinue unless directed by your doctor.    tacrolimus 1 mg oral capsule  -- 10 cap(s) by mouth once a day    magnesium oxide 400 mg (241.3 mg elemental magnesium) oral tablet  -- 2 tab(s) by mouth once a day    valGANciclovir 450 mg oral tablet  -- 1 tab(s) by mouth 2 times a day MDD:2  -- Do not chew, break, or crush.  Do not take this drug if you are pregnant.  May cause drowsiness or dizziness.  Obtain medical advice before taking any non-prescription drugs as some may affect the action of this medication.  Take with food or milk.  Take with food.  This drug may impair the ability to drive or operate machinery.  Use care until you become familiar with its effects.

## 2018-04-26 NOTE — DISCHARGE NOTE ADULT - COMMUNITY RESOURCES
[employer]: [phone]. Pickup scheduled for 4/27/18 at 7AM with [facility]i [facility] ([phone]), Confirmation #: [id number] [employer]: [phone]. Pickup scheduled for 4/30/18 at 7AM with [employer] ([phone]), Confirmation #: [id number]

## 2018-04-26 NOTE — PROGRESS NOTE ADULT - ATTENDING COMMENTS
s/p ritoxan.   ASA, Mepron, Nystatin, Valcyte 450 bid, Amlodipine 5, Dilt 240, Cellcept 1g bid, Prava, Pred 12.5 bid, Na Bicarb 650 tid. Prograf 8 bid.   no events overnight.  90SR, 120/80   04-26    139  |  107   |  28   ----------------------------<  x   5.5   | 27   | 1.14     Prograf 14.5    Ca    9.3      26 Apr 2018 01:27  Phos  3.2     04-25  Mg     2.2     04-25    TPro  5.9<L>  /  Alb  4.3  /  TBili  0.5  /  DBili  x   /  AST  21  /  ALT  22  /  AlkPhos  39<L>  04-26                          10.1   12.8  )-----------( 175      ( 26 Apr 2018 01:27 )             32.4   d/c home today  B cell flow, chemistry, prograf level monday  2nd dose ritoxan one week.  repeat biopsy 2 weeks.  (UUE duplex )   outpatient apt Dr Thomas over next 10 days.  Mavyret to be started tomorrow.   Decrease in prograf 7 and 7 in anticipation of drug drug interaction with decreased prograf clearance.   Restart lovanox   Marvin Campbell

## 2018-04-26 NOTE — DISCHARGE NOTE ADULT - CARE PROVIDERS DIRECT ADDRESSES
,DirectAddress_Unknown,[email or web address]

## 2018-04-26 NOTE — PROGRESS NOTE ADULT - SUBJECTIVE AND OBJECTIVE BOX
Behavioral Cardiology Progress Note     HPI:  Mr. Baez is a 67 year-old man, , only son . Owns house he lives in with his brother.  Stage D chronic systolic heart failure due to NICM, s/p LVAD  c/b pump thrombus now s/p OHT  (CMV D-/R+ and Toxo D-/R+), with post- op course c/b primary graft dysfunction.  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    Current stressors:   Hospitalization   s/p Heart transplant (18)      Support system/family support: Good support from family and close friend      Coping strategies: Talking with family and staff, watching TV, his pastora, talking to his granddaughter     Understanding of medical illness and treatment plan:   Ongoing education on medication management provided by HT coordinator and other team members. Strategies being utilized to accommodate limited literacy (pictures of medication, frequent teach-back).  Good understanding of need for lifelong immunosuppressant medication, importance of calling HT coordinator.  Benefits from frequent review and teach-back of all education.     MSE:  Seen sitting in chair. A&Ox3.  Well-related with good eye contact. Speech normal rate and volume. Thought process goal directed. No evidence of any psychosis, delusions, jona. Mood upbeat, "I'm going home."  Affect full range. Insight and judgment adequate.

## 2018-04-26 NOTE — CONSULT NOTE ADULT - ATTENDING COMMENTS
I have seen and examined this patient, and have authored the present notes. I discussed with the patient and Dr. Deshpande the decision to use albumin as replacement fluid during plasma exchange today. I also discussed with the patient the possible complications of plasmapheresis, including citrate anticoagulation and hypocalcemia signs and symptoms.
cardiac transplant  AMR s/p pharesis and IVIG, rituxan with hyperkalemia  Has been off bactrim  Tac level adjusted and now appropriate  Renal function stable  Low K diet  Continue bicarb  No objection to florinef 0.1 daily if persistent hyperkalemia  Pt being discharged home now  Pt can followup with Dr. Hutchins in [address] next week May 3 at 10AM

## 2018-04-26 NOTE — DISCHARGE NOTE ADULT - HOSPITAL COURSE
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 Found to have b/l IJ/subclavian thrombi as well at that time and was treated with ongoing AC. 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 S/p PP/IVIg 3 treatments. TTE 4/24 shows improved LVEF to 57% suggesting his initial issue was graft dysfunction. [place]ro level continues to be elevated. Received Rituximab yesterday. With b/l IJ/subclavian thrombi found postop, Will hold AC until he completes plasmapheresis as it depletes clotting factors. He was supposed to complete a 3 month course for UE DVT. C/w tacrolimus 8 mg BID with food tomorrow, c/w prednisone 12.5 mg po BID, C/w CellCept 1000 mg PO BID. C/w Valcyte for CMV ppx to 450 mg BID. Routine CMV PCR testing was negative on 4/19. Continue Toxo ppx with Mepron 1500mg PO daily with food. Continue PCP ppx with Mepron 1500mg PO daily. For thrush, continue Nystatin S/S 5 mL QID until prednisone < 10 mg po BID. Right 3rd digit ulceration improving. Review if silver sulfadiazine dressings still required by plastics. HCV viral load by PCR 4/19 was 42,855. Therapy to be initiated by Dr. Thomas (Hepatology) after discharge. 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 Found to have b/l IJ/subclavian thrombi as well at that time and was treated with ongoing AC. 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 Now s/p PP/IVIg 3 treatments, being discharged home. Additional discharge instructions given to patient by  Transplant team and Transplant coordinator, Anum Campos.

## 2018-04-26 NOTE — DISCHARGE NOTE ADULT - MEDICATION SUMMARY - MEDICATIONS TO STOP TAKING
I will STOP taking the medications listed below when I get home from the hospital:    furosemide 20 mg oral tablet  -- 1 tab(s) by mouth once a day MDD:1  -- Avoid prolonged or excessive exposure to direct and/or artificial sunlight while taking this medication.  It is very important that you take or use this exactly as directed.  Do not skip doses or discontinue unless directed by your doctor.  It may be advisable to drink a full glass orange juice or eat a banana daily while taking this medication.

## 2018-04-26 NOTE — PROGRESS NOTE ADULT - SUBJECTIVE AND OBJECTIVE BOX
Interval Events:    Tolerated rituximab well.    ROS: Denies HA/dizziness/CP/SOB/PND/orthopnea/LE edema/abd pain    MEDICATIONS:  amLODIPine   Tablet 5 milliGRAM(s) Oral daily  aspirin enteric coated 81 milliGRAM(s) Oral daily  diltiazem    milliGRAM(s) Oral daily  atovaquone Suspension 1500 milliGRAM(s) Oral daily  nystatin    Suspension 410604 Unit(s) Oral every 6 hours  valGANciclovir 450 milliGRAM(s) Oral <User Schedule>  docusate sodium 100 milliGRAM(s) Oral three times a day PRN  famotidine    Tablet 20 milliGRAM(s) Oral two times a day  pravastatin 20 milliGRAM(s) Oral at bedtime  predniSONE   Tablet 12.5 milliGRAM(s) Oral <User Schedule>  magnesium oxide 400 milliGRAM(s) Oral <User Schedule>  multivitamin 1 Tablet(s) Oral daily  mycophenolate mofetil 1000 milliGRAM(s) Oral <User Schedule>  silver sulfADIAZINE 1% Cream 1 Application(s) Topical two times a day  sodium bicarbonate 650 milliGRAM(s) Oral three times a day  tacrolimus 8 milliGRAM(s) Oral <User Schedule>      PHYSICAL EXAM:  T(C): 36.5 (04-26-18 @ 08:00), Max: 36.6 (04-25-18 @ 12:30)  HR: 101 (04-26-18 @ 10:00) (93 - 110)  BP: 114/71 (04-26-18 @ 10:00) (108/69 - 133/78)  RR: 21 (04-26-18 @ 10:00) (13 - 36)  SpO2: 100% (04-26-18 @ 10:00) (98% - 100%)  Wt(kg): --  I&O's Summary    25 Apr 2018 07:01  -  26 Apr 2018 07:00  --------------------------------------------------------  IN: 1380 mL / OUT: 1325 mL / NET: 55 mL    26 Apr 2018 07:01  -  26 Apr 2018 10:42  --------------------------------------------------------  IN: 200 mL / OUT: 400 mL / NET: -200 mL      Appearance: No Acute Distress  HEENT: No JVD  Cardiovascular: Normal S1 S2, RRR, No murmurs/rubs/gallops  Respiratory: Clear to auscultation bilaterally  Gastrointestinal: Soft, Non-tender	  Skin: No cyanosis	  Neurologic: Non-focal  Extremities: No clubbing, cyanosis or edema  Psychiatry: A & O x 3, Mood & affect appropriate    LABS:	 	    CBC Full  -  ( 26 Apr 2018 01:27 )  WBC Count : 12.8 K/uL  Hemoglobin : 10.1 g/dL  Hematocrit : 32.4 %  Platelet Count - Automated : 175 K/uL  Mean Cell Volume : 94.6 fl  Mean Cell Hemoglobin : 29.5 pg  Mean Cell Hemoglobin Concentration : 31.2 gm/dL  Auto Neutrophil # : x  Auto Lymphocyte # : x  Auto Monocyte # : x  Auto Eosinophil # : x  Auto Basophil # : x  Auto Neutrophil % : x  Auto Lymphocyte % : x  Auto Monocyte % : x  Auto Eosinophil % : x  Auto Basophil % : x    04-26    x   |  x   |  x   ----------------------------<  x   5.5<H>   |  x   |  x   04-26    139  |  107  |  28<H>  ----------------------------<  221<H>  5.8<H>   |  22  |  1.14    Ca    9.3      26 Apr 2018 01:27  Ca    9.1      25 Apr 2018 06:52  Phos  3.2     04-25  Mg     2.2     04-25    TPro  5.9<L>  /  Alb  4.3  /  TBili  0.5  /  DBili  x   /  AST  21  /  ALT  22  /  AlkPhos  39<L>  04-26  TPro  5.9<L>  /  Alb  4.4  /  TBili  0.7  /  DBili  x   /  AST  19  /  ALT  18  /  AlkPhos  26<L>  04-25      TELEMETRY: Interval Events:    Tolerated rituximab well, feels well this AM.    ROS: Denies HA/dizziness/CP/SOB/PND/orthopnea/LE edema/abd pain    MEDICATIONS:  amLODIPine   Tablet 5 milliGRAM(s) Oral daily  aspirin enteric coated 81 milliGRAM(s) Oral daily  diltiazem    milliGRAM(s) Oral daily  atovaquone Suspension 1500 milliGRAM(s) Oral daily  nystatin    Suspension 471138 Unit(s) Oral every 6 hours  valGANciclovir 450 milliGRAM(s) Oral <User Schedule>  docusate sodium 100 milliGRAM(s) Oral three times a day PRN  famotidine    Tablet 20 milliGRAM(s) Oral two times a day  pravastatin 20 milliGRAM(s) Oral at bedtime  predniSONE   Tablet 12.5 milliGRAM(s) Oral <User Schedule>  magnesium oxide 400 milliGRAM(s) Oral <User Schedule>  multivitamin 1 Tablet(s) Oral daily  mycophenolate mofetil 1000 milliGRAM(s) Oral <User Schedule>  silver sulfADIAZINE 1% Cream 1 Application(s) Topical two times a day  sodium bicarbonate 650 milliGRAM(s) Oral three times a day  tacrolimus 8 milliGRAM(s) Oral <User Schedule>      PHYSICAL EXAM:  T(C): 36.5 (04-26-18 @ 08:00), Max: 36.6 (04-25-18 @ 12:30)  HR: 101 (04-26-18 @ 10:00) (93 - 110)  BP: 114/71 (04-26-18 @ 10:00) (108/69 - 133/78)  RR: 21 (04-26-18 @ 10:00) (13 - 36)  SpO2: 100% (04-26-18 @ 10:00) (98% - 100%)  Wt(kg): --  I&O's Summary    25 Apr 2018 07:01  -  26 Apr 2018 07:00  --------------------------------------------------------  IN: 1380 mL / OUT: 1325 mL / NET: 55 mL    26 Apr 2018 07:01  -  26 Apr 2018 10:42  --------------------------------------------------------  IN: 200 mL / OUT: 400 mL / NET: -200 mL    Appearance: No Acute Distress  HEENT: No JVD  Cardiovascular: Normal S1 S2, RRR, No murmurs/rubs/gallops  Respiratory: Clear to auscultation bilaterally  Gastrointestinal: Soft, Non-tender	  Skin: No cyanosis	  Neurologic: Non-focal  Extremities: No edema  Psychiatry: A & O x 3, Mood & affect appropriate    LABS:	 	    CBC Full  -  ( 26 Apr 2018 01:27 )  WBC Count : 12.8 K/uL  Hemoglobin : 10.1 g/dL  Hematocrit : 32.4 %  Platelet Count - Automated : 175 K/uL  Mean Cell Volume : 94.6 fl  Mean Cell Hemoglobin : 29.5 pg  Mean Cell Hemoglobin Concentration : 31.2 gm/dL      04-26    x   |  x   |  x   ----------------------------<  x   5.5<H>   |  x   |  x   04-26    139  |  107  |  28<H>  ----------------------------<  221<H>  5.8<H>   |  22  |  1.14    Ca    9.3      26 Apr 2018 01:27  Ca    9.1      25 Apr 2018 06:52  Phos  3.2     04-25  Mg     2.2     04-25    TPro  5.9<L>  /  Alb  4.3  /  TBili  0.5  /  DBili  x   /  AST  21  /  ALT  22  /  AlkPhos  39<L>  04-26  TPro  5.9<L>  /  Alb  4.4  /  TBili  0.7  /  DBili  x   /  AST  19  /  ALT  18  /  AlkPhos  26<L>  04-25      TELEMETRY:

## 2018-04-26 NOTE — PROGRESS NOTE ADULT - ASSESSMENT
Mood upbeat in setting of discharge today.  Waiting for brother Arpit to pick him up this evening.  Sleeping well.  Appetite good.  Ambulating without distress.  Receptive to support and validation.

## 2018-04-26 NOTE — DISCHARGE NOTE ADULT - MEDICATION SUMMARY - MEDICATIONS TO TAKE
I will START or STAY ON the medications listed below when I get home from the hospital:    predniSONE 2.5 mg oral tablet  -- 5 tab(s) by mouth 2 times a day at 0800 and 2000  -- Indication: For Antirejection    aspirin 81 mg oral delayed release tablet  -- 1 tab(s) by mouth once a day  -- Indication: For Heart protection    sodium bicarbonate 650 mg oral tablet  -- 1 tab(s) by mouth 3 times a day  -- Indication: For Electrolytes    dilTIAZem 240 mg/24 hours oral capsule, extended release  -- 1 cap(s) by mouth once a day  -- Indication: For Blood pressure    enoxaparin  -- 80 milligram(s) subcutaneously every 12 hours  -- Indication: For Blood thinner    nystatin 100,000 units/mL oral suspension  -- 5 milliliter(s) by mouth every 6 hours  -- Indication: For Mouthwash    pravastatin 20 mg oral tablet  -- 1 tab(s) by mouth once a day (at bedtime)  -- Indication: For Cholesterol    valGANciclovir 450 mg oral tablet  -- 1 tab(s) by mouth 2 times a day at 0800 and 2000  -- Indication: For Antiviral    amLODIPine 5 mg oral tablet  -- 1 tab(s) by mouth once a day  -- Indication: For Blood pressure    silver sulfADIAZINE 1% topical cream  -- 1 application on skin 2 times a day  -- Indication: For Finger lesion    famotidine 20 mg oral tablet  -- 1 tab(s) by mouth 2 times a day  -- Indication: For Acid reflux    CellCept 500 mg oral tablet  -- 2 tab(s) by mouth 2 times a day at 0800 and 2000  -- Indication: For Antirejection    tacrolimus  -- 7 milligram(s) by mouth 2 times a day at 0800 and 2000  -- Indication: For Antirejection    docusate sodium 100 mg oral capsule  -- 1 cap(s) by mouth 3 times a day, As needed, Constipation  -- Indication: For Constipation    magnesium oxide 400 mg (241.3 mg elemental magnesium) oral tablet  -- 1 tab(s) by mouth 2 times a day at 0900 and 2100  -- Indication: For Electrolytes    atovaquone 750 mg/5 mL oral suspension  -- 10 milliliter(s) by mouth once a day  -- Indication: For Antibiotic    Citracal + D 315 mg-250 intl units oral tablet  -- 2 tab(s) by mouth 2 times a day  -- Indication: For Electrolytes    Multiple Vitamins oral tablet  -- 1 tab(s) by mouth once a day  -- Indication: For Multivitamin

## 2018-04-27 LAB
CULTURE RESULTS: SIGNIFICANT CHANGE UP
SPECIMEN SOURCE: SIGNIFICANT CHANGE UP

## 2018-04-30 ENCOUNTER — OTHER (OUTPATIENT)
Age: 68
End: 2018-04-30

## 2018-04-30 ENCOUNTER — LABORATORY RESULT (OUTPATIENT)
Age: 68
End: 2018-04-30

## 2018-04-30 NOTE — DISCHARGE NOTE ADULT - OTHER SIGNIFICANT FINDINGS
ANTICOAGULATION FOLLOW-UP CLINIC VISIT    Patient Name:  Kecia Blue  Date:  4/30/2018  Contact Type:  Telephone    SUBJECTIVE:     Patient Findings     Positives No Problem Findings           OBJECTIVE    INR   Date Value Ref Range Status   04/30/2018 3.21 (H) 0.86 - 1.14 Final       ASSESSMENT / PLAN  INR assessment SUPRA    Recheck INR In: 3 DAYS    INR Location Clinic      Anticoagulation Summary as of 4/30/2018     INR goal 2.0-3.0   Today's INR 3.21!   Maintenance plan No maintenance plan   Full instructions 4/30: 4 mg; 5/1: 4 mg; 5/2: 4 mg; Otherwise No maintenance plan   Next INR check 5/3/2018   Priority INR   Target end date     Indications   Lung transplant recipient (H) [Z94.2]  Deep vein thrombosis (DVT) (H) [I82.409] [I82.409]         Anticoagulation Episode Summary     INR check location Clinic Lab    Preferred lab     Send INR reminders to [facility]    Comments Plan for 3 months of therapy- Provoked DVT. Welcome package sent . Patient staying at [facility] HIPPA OK and  Verbal OK to speak with  and leave messages on Cell [phone]      Anticoagulation Care Providers     Provider Role Specialty Phone number    Ame Chow PA-C Responsible Pulmonary [phone]            See the Encounter Report to view Anticoagulation Flowsheet and Dosing Calendar (Go to Encounters tab in chart review, and find the Anticoagulation Therapy Visit)    Spoke with patient. Gave them their lab results and new warfarin recommendation.  No changes in health, medication, or diet. No missed doses, no falls. No signs or symptoms of bleed or clotting.      Judith Salazar RN                AS ABOVE

## 2018-05-01 ENCOUNTER — OTHER (OUTPATIENT)
Age: 68
End: 2018-05-01

## 2018-05-01 LAB
ALBUMIN SERPL ELPH-MCNC: 4.5 G/DL
ALP BLD-CCNC: 80 U/L
ALT SERPL-CCNC: 30 U/L
ANION GAP SERPL CALC-SCNC: 17 MMOL/L
AST SERPL-CCNC: 25 U/L
BILIRUB SERPL-MCNC: 0.5 MG/DL
BUN SERPL-MCNC: 32 MG/DL
CALCIUM SERPL-MCNC: 9.6 MG/DL
CHLORIDE SERPL-SCNC: 108 MMOL/L
CO2 SERPL-SCNC: 14 MMOL/L
CREAT SERPL-MCNC: 1.13 MG/DL
GLUCOSE SERPL-MCNC: 221 MG/DL
POTASSIUM SERPL-SCNC: 5.9 MMOL/L
PROT SERPL-MCNC: 6.5 G/DL
SODIUM SERPL-SCNC: 139 MMOL/L
TACROLIMUS SERPL-MCNC: 13.5 NG/ML

## 2018-05-02 ENCOUNTER — LABORATORY RESULT (OUTPATIENT)
Age: 68
End: 2018-05-02

## 2018-05-02 ENCOUNTER — APPOINTMENT (OUTPATIENT)
Dept: HEPATOLOGY | Facility: CLINIC | Age: 68
End: 2018-05-02
Payer: MEDICARE

## 2018-05-02 ENCOUNTER — OTHER (OUTPATIENT)
Age: 68
End: 2018-05-02

## 2018-05-02 VITALS
DIASTOLIC BLOOD PRESSURE: 88 MMHG | RESPIRATION RATE: 17 BRPM | SYSTOLIC BLOOD PRESSURE: 126 MMHG | HEART RATE: 65 BPM | WEIGHT: 158 LBS | BODY MASS INDEX: 23.95 KG/M2 | TEMPERATURE: 97.7 F | HEIGHT: 68 IN

## 2018-05-02 PROCEDURE — ZZZZZ: CPT | Mod: PD

## 2018-05-02 PROCEDURE — 91200 LIVER ELASTOGRAPHY: CPT | Mod: PD

## 2018-05-02 PROCEDURE — 99204 OFFICE O/P NEW MOD 45 MIN: CPT | Mod: 25,PD

## 2018-05-02 PROCEDURE — 91200 LIVER ELASTOGRAPHY: CPT

## 2018-05-02 PROCEDURE — 99214 OFFICE O/P EST MOD 30 MIN: CPT | Mod: 25,PD

## 2018-05-02 NOTE — DISCHARGE NOTE ADULT - MEDICATION SUMMARY - MEDICATIONS TO CHANGE
I will SWITCH the dose or number of times a day I take the medications listed below when I get home from the hospital:  None slip in winter

## 2018-05-03 ENCOUNTER — APPOINTMENT (OUTPATIENT)
Dept: NEPHROLOGY | Facility: CLINIC | Age: 68
End: 2018-05-03

## 2018-05-03 ENCOUNTER — OTHER (OUTPATIENT)
Age: 68
End: 2018-05-03

## 2018-05-03 ENCOUNTER — OUTPATIENT (OUTPATIENT)
Dept: INPATIENT UNIT | Facility: HOSPITAL | Age: 68
LOS: 1 days | End: 2018-05-03
Payer: MEDICARE

## 2018-05-03 ENCOUNTER — TRANSCRIPTION ENCOUNTER (OUTPATIENT)
Age: 68
End: 2018-05-03

## 2018-05-03 ENCOUNTER — MEDICATION RENEWAL (OUTPATIENT)
Age: 68
End: 2018-05-03

## 2018-05-03 VITALS
SYSTOLIC BLOOD PRESSURE: 134 MMHG | RESPIRATION RATE: 18 BRPM | DIASTOLIC BLOOD PRESSURE: 93 MMHG | TEMPERATURE: 98 F | HEART RATE: 101 BPM | OXYGEN SATURATION: 97 %

## 2018-05-03 VITALS
RESPIRATION RATE: 18 BRPM | DIASTOLIC BLOOD PRESSURE: 93 MMHG | TEMPERATURE: 98 F | WEIGHT: 154.54 LBS | HEART RATE: 109 BPM | HEIGHT: 68 IN | OXYGEN SATURATION: 97 % | SYSTOLIC BLOOD PRESSURE: 132 MMHG

## 2018-05-03 DIAGNOSIS — Z95.811 PRESENCE OF HEART ASSIST DEVICE: Chronic | ICD-10-CM

## 2018-05-03 DIAGNOSIS — T86.11 KIDNEY TRANSPLANT REJECTION: ICD-10-CM

## 2018-05-03 DIAGNOSIS — Z94.1 HEART TRANSPLANT STATUS: Chronic | ICD-10-CM

## 2018-05-03 DIAGNOSIS — E87.5 HYPERKALEMIA: ICD-10-CM

## 2018-05-03 LAB
ALBUMIN SERPL ELPH-MCNC: 4.2 G/DL
ALBUMIN SERPL ELPH-MCNC: 4.3 G/DL — SIGNIFICANT CHANGE UP (ref 3.3–5)
ALP BLD-CCNC: 63 U/L
ALP SERPL-CCNC: 59 U/L — SIGNIFICANT CHANGE UP (ref 40–120)
ALT FLD-CCNC: 36 U/L — SIGNIFICANT CHANGE UP (ref 10–45)
ALT SERPL-CCNC: 36 U/L
ANION GAP SERPL CALC-SCNC: 10 MMOL/L — SIGNIFICANT CHANGE UP (ref 5–17)
ANION GAP SERPL CALC-SCNC: 12 MMOL/L — SIGNIFICANT CHANGE UP (ref 5–17)
ANION GAP SERPL CALC-SCNC: 13 MMOL/L
AST SERPL-CCNC: 28 U/L
AST SERPL-CCNC: 45 U/L — HIGH (ref 10–40)
BILIRUB SERPL-MCNC: 0.6 MG/DL
BILIRUB SERPL-MCNC: 0.7 MG/DL — SIGNIFICANT CHANGE UP (ref 0.2–1.2)
BUN SERPL-MCNC: 31 MG/DL
BUN SERPL-MCNC: 33 MG/DL — HIGH (ref 7–23)
BUN SERPL-MCNC: 35 MG/DL — HIGH (ref 7–23)
CALCIUM SERPL-MCNC: 9.1 MG/DL — SIGNIFICANT CHANGE UP (ref 8.4–10.5)
CALCIUM SERPL-MCNC: 9.7 MG/DL
CALCIUM SERPL-MCNC: 9.9 MG/DL — SIGNIFICANT CHANGE UP (ref 8.4–10.5)
CHLORIDE SERPL-SCNC: 105 MMOL/L
CHLORIDE SERPL-SCNC: 106 MMOL/L — SIGNIFICANT CHANGE UP (ref 96–108)
CHLORIDE SERPL-SCNC: 106 MMOL/L — SIGNIFICANT CHANGE UP (ref 96–108)
CMV DNA SPEC QL NAA+PROBE: NOT DETECTED IU/ML
CMVPCR LOG: NOT DETECTED LOGIU/ML
CO2 SERPL-SCNC: 20 MMOL/L
CO2 SERPL-SCNC: 20 MMOL/L — LOW (ref 22–31)
CO2 SERPL-SCNC: 22 MMOL/L — SIGNIFICANT CHANGE UP (ref 22–31)
CREAT SERPL-MCNC: 1.1 MG/DL
CREAT SERPL-MCNC: 1.24 MG/DL — SIGNIFICANT CHANGE UP (ref 0.5–1.3)
CREAT SERPL-MCNC: 1.27 MG/DL — SIGNIFICANT CHANGE UP (ref 0.5–1.3)
GLUCOSE SERPL-MCNC: 131 MG/DL — HIGH (ref 70–99)
GLUCOSE SERPL-MCNC: 204 MG/DL — HIGH (ref 70–99)
HCT VFR BLD CALC: 30.6 % — LOW (ref 39–50)
HCV RNA SERPL NAA DL=5-ACNC: 325 IU/ML
HCV RNA SERPL NAA+PROBE-LOG IU: 2.51 LOGIU/ML
HGB BLD-MCNC: 9.4 G/DL — LOW (ref 13–17)
MCHC RBC-ENTMCNC: 28.5 PG — SIGNIFICANT CHANGE UP (ref 27–34)
MCHC RBC-ENTMCNC: 30.7 GM/DL — LOW (ref 32–36)
MCV RBC AUTO: 92.7 FL — SIGNIFICANT CHANGE UP (ref 80–100)
PLATELET # BLD AUTO: 280 K/UL — SIGNIFICANT CHANGE UP (ref 150–400)
POTASSIUM SERPL-MCNC: 5.7 MMOL/L — HIGH (ref 3.5–5.3)
POTASSIUM SERPL-MCNC: 6.2 MMOL/L — CRITICAL HIGH (ref 3.5–5.3)
POTASSIUM SERPL-SCNC: 5.7 MMOL/L — HIGH (ref 3.5–5.3)
POTASSIUM SERPL-SCNC: 5.8 MMOL/L
POTASSIUM SERPL-SCNC: 6.2 MMOL/L — CRITICAL HIGH (ref 3.5–5.3)
PROT SERPL-MCNC: 6.6 G/DL — SIGNIFICANT CHANGE UP (ref 6–8.3)
PROT SERPL-MCNC: 6.8 G/DL
RBC # BLD: 3.3 M/UL — LOW (ref 4.2–5.8)
RBC # FLD: 21.8 % — HIGH (ref 10.3–14.5)
SODIUM SERPL-SCNC: 136 MMOL/L — SIGNIFICANT CHANGE UP (ref 135–145)
SODIUM SERPL-SCNC: 138 MMOL/L
SODIUM SERPL-SCNC: 140 MMOL/L — SIGNIFICANT CHANGE UP (ref 135–145)
TACROLIMUS SERPL-MCNC: 19.8 NG/ML
WBC # BLD: 10.99 K/UL — HIGH (ref 3.8–10.5)
WBC # FLD AUTO: 10.99 K/UL — HIGH (ref 3.8–10.5)

## 2018-05-03 PROCEDURE — 93005 ELECTROCARDIOGRAM TRACING: CPT

## 2018-05-03 PROCEDURE — 80048 BASIC METABOLIC PNL TOTAL CA: CPT

## 2018-05-03 PROCEDURE — 93970 EXTREMITY STUDY: CPT

## 2018-05-03 PROCEDURE — 80053 COMPREHEN METABOLIC PANEL: CPT

## 2018-05-03 PROCEDURE — 85027 COMPLETE CBC AUTOMATED: CPT

## 2018-05-03 PROCEDURE — 93010 ELECTROCARDIOGRAM REPORT: CPT

## 2018-05-03 RX ORDER — NYSTATIN 500MM UNIT
500000 POWDER (EA) MISCELLANEOUS
Qty: 0 | Refills: 0 | Status: COMPLETED | OUTPATIENT
Start: 2018-05-03 | End: 2018-05-03

## 2018-05-03 RX ORDER — DIPHENHYDRAMINE HCL 50 MG
50 CAPSULE ORAL ONCE
Qty: 0 | Refills: 0 | Status: COMPLETED | OUTPATIENT
Start: 2018-05-03 | End: 2018-05-03

## 2018-05-03 RX ORDER — SODIUM POLYSTYRENE SULFONATE 4.1 MEQ/G
15 POWDER, FOR SUSPENSION ORAL ONCE
Qty: 0 | Refills: 0 | Status: COMPLETED | OUTPATIENT
Start: 2018-05-03 | End: 2018-05-03

## 2018-05-03 RX ORDER — SODIUM BICARBONATE 1 MEQ/ML
2 SYRINGE (ML) INTRAVENOUS
Qty: 0 | Refills: 0 | COMMUNITY
Start: 2018-05-03

## 2018-05-03 RX ORDER — ACETAMINOPHEN 500 MG
650 TABLET ORAL ONCE
Qty: 0 | Refills: 0 | Status: COMPLETED | OUTPATIENT
Start: 2018-05-03 | End: 2018-05-03

## 2018-05-03 RX ORDER — RITUXIMAB 10 MG/ML
700 INJECTION, SOLUTION INTRAVENOUS ONCE
Qty: 0 | Refills: 0 | Status: DISCONTINUED | OUTPATIENT
Start: 2018-05-03 | End: 2018-05-03

## 2018-05-03 RX ORDER — SODIUM BICARBONATE 1 MEQ/ML
650 SYRINGE (ML) INTRAVENOUS
Qty: 0 | Refills: 0 | Status: COMPLETED | OUTPATIENT
Start: 2018-05-03 | End: 2018-05-03

## 2018-05-03 RX ORDER — SODIUM POLYSTYRENE SULFONATE 4.1 MEQ/G
15 POWDER, FOR SUSPENSION ORAL ONCE
Qty: 0 | Refills: 0 | Status: DISCONTINUED | OUTPATIENT
Start: 2018-05-03 | End: 2018-05-03

## 2018-05-03 RX ORDER — SODIUM CHLORIDE 9 MG/ML
3 INJECTION INTRAMUSCULAR; INTRAVENOUS; SUBCUTANEOUS EVERY 8 HOURS
Qty: 0 | Refills: 0 | Status: DISCONTINUED | OUTPATIENT
Start: 2018-05-03 | End: 2018-05-03

## 2018-05-03 RX ADMIN — SODIUM CHLORIDE 3 MILLILITER(S): 9 INJECTION INTRAMUSCULAR; INTRAVENOUS; SUBCUTANEOUS at 15:38

## 2018-05-03 RX ADMIN — Medication 650 MILLIGRAM(S): at 15:51

## 2018-05-03 RX ADMIN — Medication 500000 UNIT(S): at 15:51

## 2018-05-03 RX ADMIN — Medication 50 MILLIGRAM(S): at 13:03

## 2018-05-03 RX ADMIN — Medication 650 MILLIGRAM(S): at 13:03

## 2018-05-03 RX ADMIN — SODIUM POLYSTYRENE SULFONATE 15 GRAM(S): 4.1 POWDER, FOR SUSPENSION ORAL at 14:21

## 2018-05-03 NOTE — CONSULT NOTE ADULT - PROBLEM SELECTOR RECOMMENDATION 9
in setting of CNI toxicity. Calcineurin inhbitors decrease urinary K+ excretion by causing hyporeninemic hypoaldosteronism.  K+ at present 6.2(moderately hemolyzed). Please repeat BMP to monitor K+. Recommend to increase sodium bicarb tabs to 1300 mg tid. Start HCTZ 25 mg daily to help aid K+excretion.

## 2018-05-03 NOTE — DISCHARGE NOTE ADULT - PATIENT PORTAL LINK FT
You can access the [facility] Patient Portal, offered by [facility], by registering with the following website: [email or web address]

## 2018-05-03 NOTE — CONSULT NOTE ADULT - ASSESSMENT
68 year old man with cardiac transplant 68 year old man s/p cardiac transplant with cellular rejection with hyperkalemia.

## 2018-05-03 NOTE — H&P ADULT - PROBLEM SELECTOR PLAN 1
Patient takes medications at home at [phone]  Tacrolimus [id number]/Cardizem 240mg daily  Continue Prednisone   Cellcept 1000mg Q12

## 2018-05-03 NOTE — H&P ADULT - NSHPSOCIALHISTORY_GEN_ALL_CORE
SOCIAL HISTORY:  Smoker: [ ] Yes  [x ] No        PACK YEARS:          Quit date:  ETOH use: [ ] Yes  [ x] No              FREQUENCY / QUANTITY:  Ilicit Drug use:  [ ] Yes  [x ] No  Occupation: disabled  Living arrangements: with family   Assist device use: none

## 2018-05-03 NOTE — CONSULT NOTE ADULT - SUBJECTIVE AND OBJECTIVE BOX
[facility] DIVISION OF KIDNEY DISEASES AND HYPERTENSION -- INITIAL CONSULT NOTE  --------------------------------------------------------------------------------  HPI:    67 year old man with chronic systolic heart failure due to NICM s/p HM2 LVAD 6/17 c/b pump thrombosis now s/p heart transplant on 2/23 (CMV D-/R+ and Toxo D-/R+), with post-op course c/b primary graft dysfunction, initially thought to be immune-mediated due to positive B-cell flow (negative CDC cross match) and received plasmapheresis/ IVIG  x2 with improvement in LV function. Post-transplant course has been complicated by bilateral IJ/subclavian thrombi and he has a persistent small  right sided pleural effusion as well as chronic renal failure, thought to be related to Prograf.  Echo on 4/3 showed evidence of slight worsened LV strain, LVEF of 52%, and slightly diminished right ventricular systolic function.  RHC on 4/4 showed elevated biventricular filling pressures with normal cardiac output. Biopsy showed 1R/1A cellular rejection. C4d continues to be diffusely positive and DSA. Pt was discharged on 04/10 and now returns for Rituxan infusion.      PAST HISTORY  --------------------------------------------------------------------------------  PAST MEDICAL & SURGICAL HISTORY:  DVT of upper extremity (deep vein thrombosis)  Hepatitis C virus  GIB (gastrointestinal bleeding)  Ventricular fibrillation: s/p AICD  PAF (paroxysmal atrial fibrillation): on xarelto  Non-Ischemic Cardiomyopathy  SVT (Supraventricular Tachycardia)  HTN  CHF (Congestive Heart Failure)  H/O heart transplant: 2/2018  LVAD (left ventricular assist device) present  Status post left hip replacement  History of Prior Ablation Treatment: for afib  AICD (Automatic Cardioverter/Defibrillator) Present: [employer] with 1 [employer] lead4/1/09- explanted and replaced with [employer]tr[employer] 2 leads on 9/2/09    FAMILY HISTORY:  No pertinent family history in first degree relatives    PAST SOCIAL HISTORY:    ALLERGIES & MEDICATIONS  --------------------------------------------------------------------------------  Allergies    No Known Allergies    Intolerances      Standing Inpatient Medications  acetaminophen   Tablet 650 milliGRAM(s) Oral once  diphenhydrAMINE   Injectable 50 milliGRAM(s) IV Push once  nystatin    Suspension [id number] Unit(s) Oral <User Schedule>  riTUXimab IVPB (Non - oncologic) 700 milliGRAM(s) IV Intermittent once  sodium bicarbonate 650 milliGRAM(s) Oral <User Schedule>  sodium chloride 0.9% lock flush 3 milliLiter(s) IV Push every 8 hours    PRN Inpatient Medications      REVIEW OF SYSTEMS  --------------------------------------------------------------------------------  Gen: No weight changes, fatigue, fevers/chills, weakness  Skin: No rashes  Head/Eyes/Ears/Mouth: No headache; Normal hearing; Normal vision w/o blurriness; No sinus pain/discomfort, sore throat  Respiratory: No dyspnea, cough, wheezing, hemoptysis  CV: No chest pain, PND, orthopnea  GI: No abdominal pain, diarrhea, constipation, nausea, vomiting, melena, hematochezia  : No increased frequency, dysuria, hematuria, nocturia  MSK: No joint pain/swelling; no back pain; no edema  Neuro: No dizziness/lightheadedness, weakness, seizures, numbness, tingling  Heme: No easy bruising or bleeding  Endo: No heat/cold intolerance  Psych: No significant nervousness, anxiety, stress, depression    All other systems were reviewed and are negative, except as noted.    VITALS/PHYSICAL EXAM  --------------------------------------------------------------------------------  T(C): 36.6 (05-03-18 @ 11:15), Max: 36.6 (05-03-18 @ 11:15)  HR: 109 (05-03-18 @ 11:15) (109 - 109)  BP: 132/93 (05-03-18 @ 11:15) (132/93 - 132/93)  RR: 18 (05-03-18 @ 11:15) (18 - 18)  SpO2: 97% (05-03-18 @ 11:15) (97% - 97%)  Wt(kg): --  Height (cm): 172.72 (05-03-18 @ 11:15)  Weight (kg): 70.1 (05-03-18 @ 11:15)  BMI (kg/m2): 23.5 (05-03-18 @ 11:15)  BSA (m2): 1.83 (05-03-18 @ 11:15)      Physical Exam:  	Gen: NAD  	Pulm: CTA B/L  	CV: RRR, S1S2; no rub  	Abd: +BS, soft, nontender/nondistended  	: No suprapubic tenderness  	UE: Warm, no edema; no asterixis  	LE: Warm,  no edema  	Skin: Warm, without rashes      LABS/STUDIES  --------------------------------------------------------------------------------    136  |  106  |  35  ----------------------------<  131      [05-03-18 @ 12:02]  6.2   |  20  |  1.27        Ca     9.9     [05-03-18 @ 12:02]    TPro  6.6  /  Alb  4.3  /  TBili  0.7  /  DBili  x   /  AST  45  /  ALT  36  /  AlkPhos  59  [05-03-18 @ 12:02]          Creatinine Trend:  SCr 1.27 [05-03 @ 12:02]  SCr 1.14 [04-26 @ 01:27]  SCr 1.11 [04-25 @ 06:52]  SCr 1.11 [04-25 @ 03:53]  SCr 1.48 [04-24 @ 01:09]    Urinalysis - [03-23-18 @ 16:03]      Color Yellow / Appearance Slightly Turbid / SG 1.020 / pH 5.0      Gluc Negative / Ketone Negative  / Bili Negative / Urobili Negative       Blood Small / Protein 30 / Leuk Est Small / Nitrite Negative      RBC 5 / WBC 8 / Hyaline 0 / Gran  / Sq Epi  / Non Sq Epi 4 / Bacteria Few      Iron 22, TIBC 182, %sat 12      [02-13-18 @ 12:44]  Ferritin 79.0      [02-13-18 @ 12:44]  HbA1c 5.3      [03-18-18 @ 11:20]  TSH 1.48      [02-27-18 @ 08:13]  Lipid: chol 62, TG 33, HDL 17, LDL 38      [06-07-17 @ 06:14]    HBsAb Nonreact      [05-15-17 @ 15:40]  HBsAg Nonreact      [04-25-18 @ 15:20]  HBcAb Nonreact      [04-25-18 @ 15:20]  HCV 0.21, Nonreact      [04-25-18 @ 15:20]  HIV Nonreact      [02-02-18 @ 19:25]    C3 Complement 135      [03-19-18 @ 15:13]  C4 Complement 37      [03-19-18 @ 15:13]  Free Light Chains: kappa 4.36, lambda 5.71, ratio = 0.76      [03-19 @ 15:13]  Immunofixation Serum:   No Monoclonal Band Identified      [03-19-18 @ 15:13]  SPEP Interpretation: Hypoalbuminemia.      [06-27-17 @ 15:50]  Cryoglobulin: Negative      [03-19-18 @ 15:13]

## 2018-05-03 NOTE — H&P ADULT - PSH
AICD (Automatic Cardioverter/Defibrillator) Present  [employer] with 1 [employer] lead4/1/09- explanted and replaced with [email or web address] 2 leads on 9/2/09  H/O heart transplant  2/2018  History of Prior Ablation Treatment  for afib  LVAD (left ventricular assist device) present    Status post left hip replacement

## 2018-05-03 NOTE — DISCHARGE NOTE ADULT - PLAN OF CARE
recovery continue medication as prescribed normalization of potassium given kayaxelate in hospital prevent propagation continue lovenox

## 2018-05-03 NOTE — H&P ADULT - HISTORY OF PRESENT ILLNESS
67M with ACC/AHA stage D chronic systolic heart failure due to NICM s/p HM2 LVAD 6/17 c/b pump thrombosis now s/p heart transplant on 2/23 (CMV D-/R+ and Toxo D-/R+), with post-op course c/b primary graft dysfunction, initially thought to be immune-mediated due to positive B-cell flow (negative CDC cross match) and received plasmapharesis/IVIg x2 with improvement in LV function. Post-transplant course has been complicated by bilateral IJ/subclavian thrombi and he has a persistent small  right sided pleural effusion as well as chronic renal failure, thought to be related to Prograf.  Echo on 4/3 showed evidence of slight worsened LV strain, LVEF of 52%, and slightly diminished right ventricular systolic function.  RHC on 4/4 showed elevated biventricular filling pressures with normal cardiac output. Biopsy showed 1R/1A cellular rejection. C4D continues to be diffusely positive. DSA is unremarkable.    He continued to make progress and was discharged to home on 4/10  He presented today for a chemotherapy infusion for graft dysfunction.  Will be discharged later today

## 2018-05-03 NOTE — ADVANCED PRACTICE NURSE CONSULT - ASSESSMENT
Patient received in bed alert and oriented x 3, capable of expressing all needs to staff. Cycle 2, day 1/1. Height and weight verified.  Lab results as per sunrise, Md aware of same, patient to receive kayexalate. Vital signs stable prior to chemotherapy and  within acceptable parameters, see sunrise. Pt educated on the importance of saving urine, verbalizes good understanding. Pt education done re chemo regimen, drug effects and potential side effects, written materials provided, pt states understanding. Reports that he tolerated his last chemotherapy well without side effects. Pt with  left arm peripheral line, site free from signs and symptoms of infection, good blood return obtained. Pre- medicated with Tylenol 650mg PO and Benadryl 50mg IVP. Rituxan 375mg/g1=624gk IVPB, same started at 50cc/hr, increased by 50cc/he every 30 minutes to a maximum rate of 400ml/hr Patient received in bed alert and oriented x 3, capable of expressing all needs to staff. Cycle 2, day 1/1. Height and weight verified.  Lab results as per sunrise, Md aware of same, patient to receive kayexalate. Vital signs stable prior to chemotherapy and  within acceptable parameters, see sunrise. Pt educated on the importance of saving urine, verbalizes good understanding. Pt education done re chemo regimen, drug effects and potential side effects, written materials provided, pt states understanding. Reports that he tolerated his last chemotherapy well without side effects. Pt with  left arm peripheral line, site free from signs and symptoms of infection, good blood return obtained. Pre- medicated with Tylenol 650mg PO and Benadryl 50mg IVP. Rituxan 375mg/z8=686jt IVPB, same started at 50cc/hr, increased by 50cc/he every 30 minutes to a maximum rate of 400ml/hr, patient tolerated same well without incidence.

## 2018-05-03 NOTE — DISCHARGE NOTE ADULT - CARE PLAN
Principal Discharge DX:	H/O heart transplant  Goal:	recovery  Assessment and plan of treatment:	continue medication as prescribed  Secondary Diagnosis:	Hyperkalemia  Goal:	normalization of potassium  Assessment and plan of treatment:	given kayaxelate in hospital  Secondary Diagnosis:	DVT of upper extremity (deep vein thrombosis)  Goal:	prevent propagation  Assessment and plan of treatment:	continue lovenox

## 2018-05-03 NOTE — DISCHARGE NOTE ADULT - CARE PROVIDER_API CALL
Annabelle Mccabe (MD; PhD), Adv Heart Fail Trnsplnt Cardio; Cardiovascular Disease; Internal Medicine  [address] [place] [address]  Phone: [phone]  Fax: [phone]

## 2018-05-03 NOTE — H&P ADULT - ASSESSMENT
67M with ACC/AHA stage D chronic systolic heart failure due to NICM s/p HM2 LVAD 6/17 c/b pump thrombosis now s/p heart transplant on 2/23 (CMV D-/R+ and Toxo D-/R+), with post-op course c/b primary graft dysfunction, initially thought to be immune-mediated due to positive B-cell flow (negative CDC cross match) and received plasmapharesis/IVIg x2 with improvement in LV function. Post-transplant course has been complicated by bilateral IJ/subclavian thrombi and he has a persistent small  right sided pleural effusion as well as chronic renal failure, thought to be related to Prograf. Echocardiogram on 4/3 showed evidence of slight worsened LV strain, LVEF of 52%, and slightly diminished right ventricular systolic function.  RHC on 4/4 showed elevated biventricular filling pressures with normal cardiac output. Biopsy showed 1R/1A cellular rejection. C4D continues to be diffusely positive. DSA is unremarkable. 4/5 - Rounds made with Dr. Stahl:   tacro level - 22.4 - will hold prograf this evening - & decrease dose to 9mg po bid starting 4/6 IV lasix 40mg x 1;  - with increase tac level - will decrease Cardizem CD to 240mg po daily  4/6 prograf level: 14.4 this am- d/w Dr. Stahl- prograf 5 mg given this am then 12 mg this evening; prograf 9 bid to start in am sat 4/7; VSS;  rt pleural eff- sono site; minimal fluid as per CTU Pa/NP; driveline vac changed today   4/7 GFR improved. Heparin BID changed to Lovenox bid. Magnesium oxide decreased to  once daily. Valcyte 450mg increased QD  4/8 SR  - possible d/c home Monday as per transplant team.  4/9 hyperkalemia requiring kayexalate, will need to repeat.  Increase ambulation.  D/c planning    Returns today for infusion of chemotherapy for primary graft dysfunction.  Will be discharged later today

## 2018-05-03 NOTE — H&P ADULT - NSHPPHYSICALEXAM_GEN_ALL_CORE
General: WN/WD NAD  Neurology: A&Ox3, nonfocal, RUVALCABA x 4  Head:  Normocephalic, atraumatic  ENT:  Mucosa moist, no ulcerations  Neck:  Supple, no sinuses or palpable masses  Lymphatic:  No palpable cervical, supraclavicular, axillary or inguinal adenopathy  Respiratory: CTA B/L  CV: RRR, S1S2, no murmur  Abdominal: Soft, NT, ND no palpable mass  MSK: No edema, + peripheral pulses, FROM all 4 extremities

## 2018-05-03 NOTE — CONSULT NOTE ADULT - ATTENDING COMMENTS
#Heart transplant  #on immunosuppression- with hyperkalemia- likely hyporeninemic, hypoaldo/distal RTA from CNI/tacrolimus level  -for hyperkalemia acutely would give Kayexalate, and repeat  of note it is moderately hemolyzed  -chronically-increase sodium bicarb tabs to 650mg- 2 tabs po tid; and start HCTZ 25mg po daily  d/w dr fraire and dr holcomb

## 2018-05-03 NOTE — DISCHARGE NOTE ADULT - MEDICATION SUMMARY - MEDICATIONS TO CHANGE
I will SWITCH the dose or number of times a day I take the medications listed below when I get home from the hospital:    sodium bicarbonate 650 mg oral tablet  -- 1 tab(s) by mouth 3 times a day

## 2018-05-03 NOTE — DISCHARGE NOTE ADULT - MEDICATION SUMMARY - MEDICATIONS TO TAKE
I will START or STAY ON the medications listed below when I get home from the hospital:    predniSONE 2.5 mg oral tablet  -- 5 tab(s) by mouth 2 times a day at 0800 and 2000  -- Indication: For Home meds    aspirin 81 mg oral delayed release tablet  -- 1 tab(s) by mouth once a day  -- Indication: For Home med    sodium bicarbonate 650 mg oral tablet  -- 2 tab(s) by mouth 3 times a day  -- Indication: For Home med    dilTIAZem 240 mg/24 hours oral capsule, extended release  -- 1 cap(s) by mouth once a day  -- Indication: For Home med    enoxaparin  -- 80 milligram(s) subcutaneously every 12 hours  -- Indication: For Home med     nystatin 100,000 units/mL oral suspension  -- 5 milliliter(s) by mouth every 6 hours  -- Indication: For Home med    pravastatin 20 mg oral tablet  -- 1 tab(s) by mouth once a day (at bedtime)  -- Indication: For Home med    valGANciclovir 450 mg oral tablet  -- 1 tab(s) by mouth 2 times a day at 0800 and 2000  -- Indication: For Home med    amLODIPine 5 mg oral tablet  -- 1 tab(s) by mouth once a day  -- Indication: For Home med    silver sulfADIAZINE 1% topical cream  -- 1 application on skin 2 times a day  -- Indication: For Home med    hydroCHLOROthiazide 25 mg oral tablet  -- 1 tab(s) by mouth once a day  -- Indication: For blood pressure    famotidine 20 mg oral tablet  -- 1 tab(s) by mouth 2 times a day  -- Indication: For Home med    CellCept 500 mg oral tablet  -- 2 tab(s) by mouth 2 times a day at 0800 and 2000  -- Indication: For Home med    tacrolimus  -- 7 milligram(s) by mouth 2 times a day at 0800 and 2000  -- Indication: For Home med    docusate sodium 100 mg oral capsule  -- 1 cap(s) by mouth 3 times a day, As needed, Constipation  -- Indication: For Home med    magnesium oxide 400 mg (241.3 mg elemental magnesium) oral tablet  -- 1 tab(s) by mouth 2 times a day at 0900 and 2100  -- Indication: For Home med    atovaquone 750 mg/5 mL oral suspension  -- 10 milliliter(s) by mouth once a day  -- Indication: For Home med    Citracal + D 315 mg-250 intl units oral tablet  -- 2 tab(s) by mouth 2 times a day  -- Indication: For Home med    Multiple Vitamins oral tablet  -- 1 tab(s) by mouth once a day  -- Indication: For Home med

## 2018-05-08 ENCOUNTER — OTHER (OUTPATIENT)
Age: 68
End: 2018-05-08

## 2018-05-09 ENCOUNTER — APPOINTMENT (OUTPATIENT)
Dept: CARDIOLOGY | Facility: CLINIC | Age: 68
End: 2018-05-09
Payer: MEDICARE

## 2018-05-09 ENCOUNTER — OUTPATIENT (OUTPATIENT)
Dept: OUTPATIENT SERVICES | Facility: HOSPITAL | Age: 68
LOS: 1 days | End: 2018-05-09
Payer: MEDICARE

## 2018-05-09 ENCOUNTER — APPOINTMENT (OUTPATIENT)
Dept: CV DIAGNOSITCS | Facility: HOSPITAL | Age: 68
End: 2018-05-09

## 2018-05-09 ENCOUNTER — RESULT REVIEW (OUTPATIENT)
Age: 68
End: 2018-05-09

## 2018-05-09 VITALS
RESPIRATION RATE: 18 BRPM | HEIGHT: 68 IN | OXYGEN SATURATION: 100 % | BODY MASS INDEX: 23.79 KG/M2 | DIASTOLIC BLOOD PRESSURE: 90 MMHG | WEIGHT: 157 LBS | HEART RATE: 106 BPM | SYSTOLIC BLOOD PRESSURE: 127 MMHG

## 2018-05-09 VITALS
WEIGHT: 157.63 LBS | DIASTOLIC BLOOD PRESSURE: 90 MMHG | HEIGHT: 68 IN | TEMPERATURE: 98 F | SYSTOLIC BLOOD PRESSURE: 127 MMHG | OXYGEN SATURATION: 100 % | RESPIRATION RATE: 16 BRPM | HEART RATE: 106 BPM

## 2018-05-09 DIAGNOSIS — Z94.1 HEART TRANSPLANT STATUS: Chronic | ICD-10-CM

## 2018-05-09 DIAGNOSIS — Z95.811 PRESENCE OF HEART ASSIST DEVICE: Chronic | ICD-10-CM

## 2018-05-09 DIAGNOSIS — Z94.1 HEART TRANSPLANT STATUS: ICD-10-CM

## 2018-05-09 LAB
ALBUMIN SERPL ELPH-MCNC: 4.2 G/DL — SIGNIFICANT CHANGE UP (ref 3.3–5)
ALP SERPL-CCNC: 66 U/L — SIGNIFICANT CHANGE UP (ref 40–120)
ALT FLD-CCNC: 44 U/L — SIGNIFICANT CHANGE UP (ref 10–45)
ANION GAP SERPL CALC-SCNC: 14 MMOL/L — SIGNIFICANT CHANGE UP (ref 5–17)
AST SERPL-CCNC: 67 U/L — HIGH (ref 10–40)
BASOPHILS # BLD AUTO: 0 K/UL — SIGNIFICANT CHANGE UP (ref 0–0.2)
BILIRUB SERPL-MCNC: 0.4 MG/DL — SIGNIFICANT CHANGE UP (ref 0.2–1.2)
BUN SERPL-MCNC: 30 MG/DL — HIGH (ref 7–23)
CALCIUM SERPL-MCNC: 9.6 MG/DL — SIGNIFICANT CHANGE UP (ref 8.4–10.5)
CHLORIDE SERPL-SCNC: 101 MMOL/L — SIGNIFICANT CHANGE UP (ref 96–108)
CO2 SERPL-SCNC: 22 MMOL/L — SIGNIFICANT CHANGE UP (ref 22–31)
CREAT SERPL-MCNC: 1.04 MG/DL — SIGNIFICANT CHANGE UP (ref 0.5–1.3)
EOSINOPHIL # BLD AUTO: 0 K/UL — SIGNIFICANT CHANGE UP (ref 0–0.5)
GLUCOSE SERPL-MCNC: 267 MG/DL — HIGH (ref 70–99)
HCT VFR BLD CALC: 34.4 % — LOW (ref 39–50)
HGB BLD-MCNC: 10.9 G/DL — LOW (ref 13–17)
INR BLD: 0.99 RATIO — SIGNIFICANT CHANGE UP (ref 0.88–1.16)
LYMPHOCYTES # BLD AUTO: 0.6 K/UL — LOW (ref 1–3.3)
LYMPHOCYTES # BLD AUTO: 6 % — LOW (ref 13–44)
MAGNESIUM SERPL-MCNC: 2.3 MG/DL — SIGNIFICANT CHANGE UP (ref 1.6–2.6)
MCHC RBC-ENTMCNC: 29.9 PG — SIGNIFICANT CHANGE UP (ref 27–34)
MCHC RBC-ENTMCNC: 31.6 GM/DL — LOW (ref 32–36)
MCV RBC AUTO: 94.6 FL — SIGNIFICANT CHANGE UP (ref 80–100)
MONOCYTES # BLD AUTO: 0.2 K/UL — SIGNIFICANT CHANGE UP (ref 0–0.9)
MONOCYTES NFR BLD AUTO: 1 % — LOW (ref 2–14)
NEUTROPHILS # BLD AUTO: 9 K/UL — HIGH (ref 1.8–7.4)
NEUTROPHILS NFR BLD AUTO: 92 % — HIGH (ref 43–77)
PLATELET # BLD AUTO: 224 K/UL — SIGNIFICANT CHANGE UP (ref 150–400)
POTASSIUM SERPL-MCNC: 5.5 MMOL/L — HIGH (ref 3.5–5.3)
POTASSIUM SERPL-MCNC: 6.4 MMOL/L — CRITICAL HIGH (ref 3.5–5.3)
POTASSIUM SERPL-SCNC: 5.5 MMOL/L — HIGH (ref 3.5–5.3)
POTASSIUM SERPL-SCNC: 6.4 MMOL/L — CRITICAL HIGH (ref 3.5–5.3)
PROT SERPL-MCNC: 6.9 G/DL — SIGNIFICANT CHANGE UP (ref 6–8.3)
PROTHROM AB SERPL-ACNC: 10.7 SEC — SIGNIFICANT CHANGE UP (ref 9.8–12.7)
RBC # BLD: 3.63 M/UL — LOW (ref 4.2–5.8)
RBC # FLD: 20.8 % — HIGH (ref 10.3–14.5)
SODIUM SERPL-SCNC: 137 MMOL/L — SIGNIFICANT CHANGE UP (ref 135–145)
TACROLIMUS SERPL-MCNC: 8.6 NG/ML — SIGNIFICANT CHANGE UP
WBC # BLD: 9.8 K/UL — SIGNIFICANT CHANGE UP (ref 3.8–10.5)
WBC # FLD AUTO: 9.8 K/UL — SIGNIFICANT CHANGE UP (ref 3.8–10.5)

## 2018-05-09 PROCEDURE — 83735 ASSAY OF MAGNESIUM: CPT

## 2018-05-09 PROCEDURE — 84132 ASSAY OF SERUM POTASSIUM: CPT

## 2018-05-09 PROCEDURE — 99153 MOD SED SAME PHYS/QHP EA: CPT

## 2018-05-09 PROCEDURE — 88342 IMHCHEM/IMCYTCHM 1ST ANTB: CPT

## 2018-05-09 PROCEDURE — 88313 SPECIAL STAINS GROUP 2: CPT | Mod: 26

## 2018-05-09 PROCEDURE — 93451 RIGHT HEART CATH: CPT | Mod: 59

## 2018-05-09 PROCEDURE — 88313 SPECIAL STAINS GROUP 2: CPT

## 2018-05-09 PROCEDURE — 88341 IMHCHEM/IMCYTCHM EA ADD ANTB: CPT | Mod: 26

## 2018-05-09 PROCEDURE — 85027 COMPLETE CBC AUTOMATED: CPT

## 2018-05-09 PROCEDURE — 76937 US GUIDE VASCULAR ACCESS: CPT | Mod: 26

## 2018-05-09 PROCEDURE — 93505 ENDOMYOCARDIAL BIOPSY: CPT | Mod: 26

## 2018-05-09 PROCEDURE — C1894: CPT

## 2018-05-09 PROCEDURE — 88341 IMHCHEM/IMCYTCHM EA ADD ANTB: CPT

## 2018-05-09 PROCEDURE — C1817: CPT

## 2018-05-09 PROCEDURE — 99215 OFFICE O/P EST HI 40 MIN: CPT

## 2018-05-09 PROCEDURE — 80197 ASSAY OF TACROLIMUS: CPT

## 2018-05-09 PROCEDURE — 88342 IMHCHEM/IMCYTCHM 1ST ANTB: CPT | Mod: 26

## 2018-05-09 PROCEDURE — 88307 TISSUE EXAM BY PATHOLOGIST: CPT | Mod: 26

## 2018-05-09 PROCEDURE — 76937 US GUIDE VASCULAR ACCESS: CPT

## 2018-05-09 PROCEDURE — 80053 COMPREHEN METABOLIC PANEL: CPT

## 2018-05-09 PROCEDURE — 99152 MOD SED SAME PHYS/QHP 5/>YRS: CPT

## 2018-05-09 PROCEDURE — 88307 TISSUE EXAM BY PATHOLOGIST: CPT

## 2018-05-09 PROCEDURE — 93306 TTE W/DOPPLER COMPLETE: CPT

## 2018-05-09 PROCEDURE — 93010 ELECTROCARDIOGRAM REPORT: CPT

## 2018-05-09 PROCEDURE — 86355 B CELLS TOTAL COUNT: CPT

## 2018-05-09 PROCEDURE — 93306 TTE W/DOPPLER COMPLETE: CPT | Mod: 26

## 2018-05-09 PROCEDURE — 88346 IMFLUOR 1ST 1ANTB STAIN PX: CPT | Mod: 26

## 2018-05-09 PROCEDURE — 93505 ENDOMYOCARDIAL BIOPSY: CPT

## 2018-05-09 PROCEDURE — C1769: CPT

## 2018-05-09 PROCEDURE — 85610 PROTHROMBIN TIME: CPT

## 2018-05-09 PROCEDURE — 88350 IMFLUOR EA ADDL 1ANTB STN PX: CPT

## 2018-05-09 PROCEDURE — 88350 IMFLUOR EA ADDL 1ANTB STN PX: CPT | Mod: 26

## 2018-05-09 PROCEDURE — C1884: CPT

## 2018-05-09 PROCEDURE — 93005 ELECTROCARDIOGRAM TRACING: CPT

## 2018-05-09 PROCEDURE — 93451 RIGHT HEART CATH: CPT | Mod: 26,59

## 2018-05-09 NOTE — H&P CARDIOLOGY - HISTORY OF PRESENT ILLNESS
67M with ACC/AHA stage D chronic systolic heart failure due to NICM s/p HM2 LVAD 6/17 c/b pump thrombosis now s/p heart transplant on 2/23 (CMV D-/R+ and Toxo D-/R+), with post-op course c/b primary graft dysfunction, initially thought to be immune-mediated due to positive B-cell flow (negative CDC cross match) and received plasmapharesis/IVIg x2 with improvement in LV function. Post-transplant course has been complicated by bilateral IJ/subclavian thrombi and he has a persistent small  right sided pleural effusion as well as chronic renal failure, thought to be related to Prograf.  Echo on 4/3 showed evidence of slight worsened LV strain, LVEF of 52%, and slightly diminished right ventricular systolic function.  RHC on 4/4 showed elevated biventricular filling pressures with normal cardiac output. Biopsy showed 1R/1A cellular rejection. C4D continues to be diffusely positive. DSA is unremarkable.    He continued to make progress and was discharged to home on 4/10  He presented today for a chemotherapy infusion for graft dysfunction.  Will be discharged later today 67M with ACC/AHA stage D chronic systolic heart failure due to NICM s/p HM2 LVAD 6/17 c/b pump thrombosis now s/p heart transplant on 2/23 (CMV D-/R+ and Toxo D-/R+), with post-op course c/b primary graft dysfunction, initially thought to be immune-mediated due to positive B-cell flow (negative CDC cross match) and received plasmapheresis/IVIg x2 with improvement in LV function. Post-transplant course has been complicated by bilateral IJ/subclavian thrombi and he has a persistent small  right sided pleural effusion as well as chronic renal failure, thought to be related to Prograf.  Echo on 4/3 showed evidence of slight worsened LV strain, LVEF of 52%, and slightly diminished right ventricular systolic function.  RHC on 4/4 showed elevated biventricular filling pressures with normal cardiac output. Biopsy showed 1R/1A cellular rejection. C4D continues to be diffusely positive. DSA is unremarkable.  He continued to make progress and was discharged to home on 4/10.  RHC on 4/19 for RHC with biopsy revealing no rejection  and presented on 5/3 for infusion of Rituxan second dose for suspected Graft rejection.  Pt presents for RHC for biopsy and echo.  Denies symptoms presently but mentions that he does have LOBATO when walking more than a 1/2 a block that has improved steadily.

## 2018-05-09 NOTE — H&P CARDIOLOGY - PSH
AICD (Automatic Cardioverter/Defibrillator) Present  [employer] with 1 [employer] lead4/1/09- explanted and replaced with [employer] 2 leads on 9/2/09  H/O heart transplant  2/2018  History of Prior Ablation Treatment  for afib  LVAD (left ventricular assist device) present    Status post left hip replacement

## 2018-05-10 ENCOUNTER — OTHER (OUTPATIENT)
Age: 68
End: 2018-05-10

## 2018-05-10 RX ORDER — PREDNISONE 5 MG/1
5 TABLET ORAL
Refills: 0 | Status: DISCONTINUED | COMMUNITY
Start: 2018-04-12 | End: 2018-05-10

## 2018-05-10 RX ORDER — TACROLIMUS 1 MG/1
1 CAPSULE ORAL
Refills: 0 | Status: DISCONTINUED | COMMUNITY
Start: 2018-04-11 | End: 2018-05-10

## 2018-05-14 ENCOUNTER — APPOINTMENT (OUTPATIENT)
Dept: HEPATOLOGY | Facility: CLINIC | Age: 68
End: 2018-05-14

## 2018-05-14 ENCOUNTER — OTHER (OUTPATIENT)
Age: 68
End: 2018-05-14

## 2018-05-14 ENCOUNTER — LABORATORY RESULT (OUTPATIENT)
Age: 68
End: 2018-05-14

## 2018-05-14 LAB
ALBUMIN SERPL ELPH-MCNC: 4.1 G/DL
ALP BLD-CCNC: 99 U/L
ALT SERPL-CCNC: 44 U/L
ANION GAP SERPL CALC-SCNC: 13 MMOL/L
AST SERPL-CCNC: 25 U/L
BILIRUB SERPL-MCNC: 0.5 MG/DL
BUN SERPL-MCNC: 25 MG/DL
CALCIUM SERPL-MCNC: 9.7 MG/DL
CHLORIDE SERPL-SCNC: 102 MMOL/L
CO2 SERPL-SCNC: 21 MMOL/L
CREAT SERPL-MCNC: 1.15 MG/DL
GLUCOSE SERPL-MCNC: 462 MG/DL
MAGNESIUM SERPL-MCNC: 2.1 MG/DL
POTASSIUM SERPL-SCNC: 4.9 MMOL/L
PROT SERPL-MCNC: 6.7 G/DL
SODIUM SERPL-SCNC: 136 MMOL/L
TACROLIMUS SERPL-MCNC: 11.4 NG/ML

## 2018-05-15 LAB
BASOPHILS # BLD AUTO: 0.01 K/UL
BASOPHILS NFR BLD AUTO: 0.1 %
EOSINOPHIL # BLD AUTO: 0 K/UL
EOSINOPHIL NFR BLD AUTO: 0 %
HCT VFR BLD CALC: 33.1 %
HGB BLD-MCNC: 10.4 G/DL
IMM GRANULOCYTES NFR BLD AUTO: 2 %
LYMPHOCYTES # BLD AUTO: 0.48 K/UL
LYMPHOCYTES NFR BLD AUTO: 6.8 %
MAN DIFF?: NORMAL
MCHC RBC-ENTMCNC: 28.9 PG
MCHC RBC-ENTMCNC: 31.4 GM/DL
MCV RBC AUTO: 91.9 FL
MONOCYTES # BLD AUTO: 0.36 K/UL
MONOCYTES NFR BLD AUTO: 5.1 %
NEUTROPHILS # BLD AUTO: 6.07 K/UL
NEUTROPHILS NFR BLD AUTO: 86 %
PLATELET # BLD AUTO: 194 K/UL
RBC # BLD: 3.6 M/UL
RBC # FLD: 21 %
WBC # FLD AUTO: 7.06 K/UL

## 2018-05-15 NOTE — H&P ADULT. - NS PRO FEM  PAP SMEARS 3YRS
May 15, 2018     Patient: Pancho Franco   YOB: 1978   Date of Visit: 5/15/2018       To Whom it May Concern:    Anup Duffy is under my professional care  He was seen in my office on 5/15/2018  He may return to work on 5/15/18  If you have any questions or concerns, please don't hesitate to call           Sincerely,          Tammy Dyana Hodgkin, CRNP        CC: No Recipients
not applicable (Male)

## 2018-05-23 ENCOUNTER — LABORATORY RESULT (OUTPATIENT)
Age: 68
End: 2018-05-23

## 2018-05-23 ENCOUNTER — APPOINTMENT (OUTPATIENT)
Dept: CARDIOLOGY | Facility: CLINIC | Age: 68
End: 2018-05-23
Payer: MEDICARE

## 2018-05-23 ENCOUNTER — NON-APPOINTMENT (OUTPATIENT)
Age: 68
End: 2018-05-23

## 2018-05-23 ENCOUNTER — INPATIENT (INPATIENT)
Facility: HOSPITAL | Age: 68
LOS: 13 days | Discharge: HOME | DRG: 638 | End: 2018-06-06
Attending: THORACIC SURGERY (CARDIOTHORACIC VASCULAR SURGERY) | Admitting: THORACIC SURGERY (CARDIOTHORACIC VASCULAR SURGERY)
Payer: MEDICARE

## 2018-05-23 VITALS
DIASTOLIC BLOOD PRESSURE: 94 MMHG | SYSTOLIC BLOOD PRESSURE: 117 MMHG | OXYGEN SATURATION: 100 % | HEART RATE: 103 BPM | RESPIRATION RATE: 20 BRPM

## 2018-05-23 VITALS
WEIGHT: 146 LBS | RESPIRATION RATE: 16 BRPM | BODY MASS INDEX: 22.13 KG/M2 | OXYGEN SATURATION: 97 % | HEIGHT: 68 IN | HEART RATE: 111 BPM | SYSTOLIC BLOOD PRESSURE: 125 MMHG | DIASTOLIC BLOOD PRESSURE: 90 MMHG

## 2018-05-23 DIAGNOSIS — Z94.1 HEART TRANSPLANT STATUS: Chronic | ICD-10-CM

## 2018-05-23 DIAGNOSIS — E08.65 DIABETES MELLITUS DUE TO UNDERLYING CONDITION WITH HYPERGLYCEMIA: ICD-10-CM

## 2018-05-23 DIAGNOSIS — R73.9 HYPERGLYCEMIA, UNSPECIFIED: ICD-10-CM

## 2018-05-23 DIAGNOSIS — B19.20 UNSPECIFIED VIRAL HEPATITIS C WITHOUT HEPATIC COMA: ICD-10-CM

## 2018-05-23 DIAGNOSIS — N17.9 ACUTE KIDNEY FAILURE, UNSPECIFIED: ICD-10-CM

## 2018-05-23 DIAGNOSIS — I10 ESSENTIAL (PRIMARY) HYPERTENSION: ICD-10-CM

## 2018-05-23 DIAGNOSIS — E78.5 HYPERLIPIDEMIA, UNSPECIFIED: ICD-10-CM

## 2018-05-23 DIAGNOSIS — Z95.811 PRESENCE OF HEART ASSIST DEVICE: Chronic | ICD-10-CM

## 2018-05-23 DIAGNOSIS — I82.629 ACUTE EMBOLISM AND THROMBOSIS OF DEEP VEINS OF UNSPECIFIED UPPER EXTREMITY: ICD-10-CM

## 2018-05-23 DIAGNOSIS — Z94.9 TRANSPLANTED ORGAN AND TISSUE STATUS, UNSPECIFIED: ICD-10-CM

## 2018-05-23 LAB
ALBUMIN SERPL ELPH-MCNC: 4.2 G/DL — SIGNIFICANT CHANGE UP (ref 3.3–5)
ALBUMIN SERPL ELPH-MCNC: 4.3 G/DL
ALP BLD-CCNC: 97 U/L
ALP SERPL-CCNC: 101 U/L — SIGNIFICANT CHANGE UP (ref 40–120)
ALT FLD-CCNC: 21 U/L — SIGNIFICANT CHANGE UP (ref 10–45)
ALT SERPL-CCNC: 23 U/L
ANION GAP SERPL CALC-SCNC: 15 MMOL/L — SIGNIFICANT CHANGE UP (ref 5–17)
ANION GAP SERPL CALC-SCNC: 19 MMOL/L
APPEARANCE UR: CLEAR — SIGNIFICANT CHANGE UP
APTT BLD: 32.2 SEC — SIGNIFICANT CHANGE UP (ref 27.5–37.4)
AST SERPL-CCNC: 22 U/L
AST SERPL-CCNC: 26 U/L — SIGNIFICANT CHANGE UP (ref 10–40)
B-OH-BUTYR SERPL-SCNC: 0.8 MMOL/L — HIGH
BASOPHILS # BLD AUTO: 0 K/UL
BASOPHILS # BLD AUTO: 0 K/UL — SIGNIFICANT CHANGE UP (ref 0–0.2)
BASOPHILS NFR BLD AUTO: 0 %
BASOPHILS NFR BLD AUTO: 0.4 % — SIGNIFICANT CHANGE UP (ref 0–2)
BILIRUB SERPL-MCNC: 0.6 MG/DL
BILIRUB SERPL-MCNC: 0.7 MG/DL — SIGNIFICANT CHANGE UP (ref 0.2–1.2)
BILIRUB UR-MCNC: NEGATIVE — SIGNIFICANT CHANGE UP
BUN SERPL-MCNC: 51 MG/DL
BUN SERPL-MCNC: 53 MG/DL — HIGH (ref 7–23)
CALCIUM SERPL-MCNC: 10.1 MG/DL
CALCIUM SERPL-MCNC: 10.1 MG/DL — SIGNIFICANT CHANGE UP (ref 8.4–10.5)
CHLORIDE SERPL-SCNC: 78 MMOL/L — LOW (ref 96–108)
CHLORIDE SERPL-SCNC: 86 MMOL/L
CHLORIDE UR-SCNC: 51 MMOL/L — SIGNIFICANT CHANGE UP
CO2 SERPL-SCNC: 21 MMOL/L
CO2 SERPL-SCNC: 26 MMOL/L — SIGNIFICANT CHANGE UP (ref 22–31)
COLOR SPEC: YELLOW — SIGNIFICANT CHANGE UP
CREAT ?TM UR-MCNC: 32 MG/DL — SIGNIFICANT CHANGE UP
CREAT SERPL-MCNC: 1.87 MG/DL — HIGH (ref 0.5–1.3)
CREAT SERPL-MCNC: 1.92 MG/DL
DIFF PNL FLD: NEGATIVE — SIGNIFICANT CHANGE UP
EOSINOPHIL # BLD AUTO: 0 K/UL
EOSINOPHIL # BLD AUTO: 0 K/UL — SIGNIFICANT CHANGE UP (ref 0–0.5)
EOSINOPHIL NFR BLD AUTO: 0 %
EOSINOPHIL NFR BLD AUTO: 0.3 % — SIGNIFICANT CHANGE UP (ref 0–6)
GAS PNL BLDV: SIGNIFICANT CHANGE UP
GLUCOSE BLDC GLUCOMTR-MCNC: 165 MG/DL — HIGH (ref 70–99)
GLUCOSE BLDC GLUCOMTR-MCNC: 349 MG/DL — HIGH (ref 70–99)
GLUCOSE BLDC GLUCOMTR-MCNC: 475 MG/DL — CRITICAL HIGH (ref 70–99)
GLUCOSE BLDC GLUCOMTR-MCNC: 570 MG/DL — CRITICAL HIGH (ref 70–99)
GLUCOSE BLDC GLUCOMTR-MCNC: >600 MG/DL — CRITICAL HIGH (ref 70–99)
GLUCOSE SERPL-MCNC: 723 MG/DL
GLUCOSE SERPL-MCNC: 904 MG/DL — CRITICAL HIGH (ref 70–99)
GLUCOSE UR QL: 1000 MG/DL
HBA1C BLD-MCNC: 10.4 % — HIGH (ref 4–5.6)
HBA1C MFR BLD HPLC: 10.2 %
HCT VFR BLD CALC: 38.4 %
HCT VFR BLD CALC: 38.9 % — LOW (ref 39–50)
HGB BLD-MCNC: 12.8 G/DL
HGB BLD-MCNC: 13 G/DL — SIGNIFICANT CHANGE UP (ref 13–17)
INR BLD: 1 RATIO — SIGNIFICANT CHANGE UP (ref 0.88–1.16)
KETONES UR-MCNC: NEGATIVE — SIGNIFICANT CHANGE UP
LEUKOCYTE ESTERASE UR-ACNC: NEGATIVE — SIGNIFICANT CHANGE UP
LYMPHOCYTES # BLD AUTO: 0.3 K/UL — LOW (ref 1–3.3)
LYMPHOCYTES # BLD AUTO: 0.45 K/UL
LYMPHOCYTES # BLD AUTO: 5.9 % — LOW (ref 13–44)
LYMPHOCYTES NFR BLD AUTO: 9.6 %
MAGNESIUM SERPL-MCNC: 2.9 MG/DL
MAN DIFF?: NORMAL
MCHC RBC-ENTMCNC: 29.2 PG
MCHC RBC-ENTMCNC: 31 PG — SIGNIFICANT CHANGE UP (ref 27–34)
MCHC RBC-ENTMCNC: 33.3 GM/DL
MCHC RBC-ENTMCNC: 33.5 GM/DL — SIGNIFICANT CHANGE UP (ref 32–36)
MCV RBC AUTO: 87.7 FL
MCV RBC AUTO: 92.7 FL — SIGNIFICANT CHANGE UP (ref 80–100)
MONOCYTES # BLD AUTO: 0.16 K/UL
MONOCYTES # BLD AUTO: 0.3 K/UL — SIGNIFICANT CHANGE UP (ref 0–0.9)
MONOCYTES NFR BLD AUTO: 3.5 %
MONOCYTES NFR BLD AUTO: 6.8 % — SIGNIFICANT CHANGE UP (ref 2–14)
NEUTROPHILS # BLD AUTO: 4.05 K/UL
NEUTROPHILS # BLD AUTO: 4.2 K/UL — SIGNIFICANT CHANGE UP (ref 1.8–7.4)
NEUTROPHILS NFR BLD AUTO: 86 %
NEUTROPHILS NFR BLD AUTO: 86.6 % — HIGH (ref 43–77)
NITRITE UR-MCNC: NEGATIVE — SIGNIFICANT CHANGE UP
PH UR: 6.5 — SIGNIFICANT CHANGE UP (ref 5–8)
PLATELET # BLD AUTO: 218 K/UL
PLATELET # BLD AUTO: 220 K/UL — SIGNIFICANT CHANGE UP (ref 150–400)
POTASSIUM SERPL-MCNC: 5.3 MMOL/L — SIGNIFICANT CHANGE UP (ref 3.5–5.3)
POTASSIUM SERPL-SCNC: 5.2 MMOL/L
POTASSIUM SERPL-SCNC: 5.3 MMOL/L — SIGNIFICANT CHANGE UP (ref 3.5–5.3)
PROT SERPL-MCNC: 7.3 G/DL — SIGNIFICANT CHANGE UP (ref 6–8.3)
PROT SERPL-MCNC: 7.5 G/DL
PROT UR-MCNC: NEGATIVE MG/DL — SIGNIFICANT CHANGE UP
PROTHROM AB SERPL-ACNC: 10.8 SEC — SIGNIFICANT CHANGE UP (ref 9.8–12.7)
RBC # BLD: 4.2 M/UL — SIGNIFICANT CHANGE UP (ref 4.2–5.8)
RBC # BLD: 4.38 M/UL
RBC # FLD: 19.1 %
RBC # FLD: 19.7 % — HIGH (ref 10.3–14.5)
SODIUM SERPL-SCNC: 119 MMOL/L — CRITICAL LOW (ref 135–145)
SODIUM SERPL-SCNC: 126 MMOL/L
SODIUM UR-SCNC: 74 MMOL/L — SIGNIFICANT CHANGE UP
SP GR SPEC: 1.02 — SIGNIFICANT CHANGE UP (ref 1.01–1.02)
TACROLIMUS SERPL-MCNC: 17.5 NG/ML
UROBILINOGEN FLD QL: NEGATIVE MG/DL — SIGNIFICANT CHANGE UP
WBC # BLD: 4.9 K/UL — SIGNIFICANT CHANGE UP (ref 3.8–10.5)
WBC # FLD AUTO: 4.71 K/UL
WBC # FLD AUTO: 4.9 K/UL — SIGNIFICANT CHANGE UP (ref 3.8–10.5)

## 2018-05-23 PROCEDURE — 76937 US GUIDE VASCULAR ACCESS: CPT | Mod: 26

## 2018-05-23 PROCEDURE — 99215 OFFICE O/P EST HI 40 MIN: CPT

## 2018-05-23 PROCEDURE — 36430 TRANSFUSION BLD/BLD COMPNT: CPT

## 2018-05-23 PROCEDURE — 93451 RIGHT HEART CATH: CPT

## 2018-05-23 PROCEDURE — 86704 HEP B CORE ANTIBODY TOTAL: CPT

## 2018-05-23 PROCEDURE — 88341 IMHCHEM/IMCYTCHM EA ADD ANTB: CPT

## 2018-05-23 PROCEDURE — 85027 COMPLETE CBC AUTOMATED: CPT

## 2018-05-23 PROCEDURE — 84100 ASSAY OF PHOSPHORUS: CPT

## 2018-05-23 PROCEDURE — P9041: CPT

## 2018-05-23 PROCEDURE — 83010 ASSAY OF HAPTOGLOBIN QUANT: CPT

## 2018-05-23 PROCEDURE — P9011: CPT

## 2018-05-23 PROCEDURE — 82330 ASSAY OF CALCIUM: CPT

## 2018-05-23 PROCEDURE — 88350 IMFLUOR EA ADDL 1ANTB STN PX: CPT

## 2018-05-23 PROCEDURE — 80197 ASSAY OF TACROLIMUS: CPT

## 2018-05-23 PROCEDURE — 87522 HEPATITIS C REVRS TRNSCRPJ: CPT

## 2018-05-23 PROCEDURE — 80053 COMPREHEN METABOLIC PANEL: CPT

## 2018-05-23 PROCEDURE — 88342 IMHCHEM/IMCYTCHM 1ST ANTB: CPT

## 2018-05-23 PROCEDURE — 99153 MOD SED SAME PHYS/QHP EA: CPT

## 2018-05-23 PROCEDURE — 80048 BASIC METABOLIC PNL TOTAL CA: CPT

## 2018-05-23 PROCEDURE — 93306 TTE W/DOPPLER COMPLETE: CPT

## 2018-05-23 PROCEDURE — 88307 TISSUE EXAM BY PATHOLOGIST: CPT

## 2018-05-23 PROCEDURE — 99223 1ST HOSP IP/OBS HIGH 75: CPT

## 2018-05-23 PROCEDURE — 36514 APHERESIS PLASMA: CPT

## 2018-05-23 PROCEDURE — 93970 EXTREMITY STUDY: CPT

## 2018-05-23 PROCEDURE — P9059: CPT

## 2018-05-23 PROCEDURE — 93005 ELECTROCARDIOGRAM TRACING: CPT

## 2018-05-23 PROCEDURE — 71045 X-RAY EXAM CHEST 1 VIEW: CPT

## 2018-05-23 PROCEDURE — 99284 EMERGENCY DEPT VISIT MOD MDM: CPT | Mod: 25,GC

## 2018-05-23 PROCEDURE — 82150 ASSAY OF AMYLASE: CPT

## 2018-05-23 PROCEDURE — 85730 THROMBOPLASTIN TIME PARTIAL: CPT

## 2018-05-23 PROCEDURE — 99152 MOD SED SAME PHYS/QHP 5/>YRS: CPT

## 2018-05-23 PROCEDURE — 85610 PROTHROMBIN TIME: CPT

## 2018-05-23 PROCEDURE — 83880 ASSAY OF NATRIURETIC PEPTIDE: CPT

## 2018-05-23 PROCEDURE — 97162 PT EVAL MOD COMPLEX 30 MIN: CPT

## 2018-05-23 PROCEDURE — C1817: CPT

## 2018-05-23 PROCEDURE — 83735 ASSAY OF MAGNESIUM: CPT

## 2018-05-23 PROCEDURE — P9045: CPT

## 2018-05-23 PROCEDURE — 97602 WOUND(S) CARE NON-SELECTIVE: CPT

## 2018-05-23 PROCEDURE — 87070 CULTURE OTHR SPECIMN AEROBIC: CPT

## 2018-05-23 PROCEDURE — 80074 ACUTE HEPATITIS PANEL: CPT

## 2018-05-23 PROCEDURE — 83615 LACTATE (LD) (LDH) ENZYME: CPT

## 2018-05-23 PROCEDURE — 85384 FIBRINOGEN ACTIVITY: CPT

## 2018-05-23 PROCEDURE — 76937 US GUIDE VASCULAR ACCESS: CPT

## 2018-05-23 PROCEDURE — C1894: CPT

## 2018-05-23 PROCEDURE — 88313 SPECIAL STAINS GROUP 2: CPT

## 2018-05-23 PROCEDURE — 84132 ASSAY OF SERUM POTASSIUM: CPT

## 2018-05-23 PROCEDURE — C1884: CPT

## 2018-05-23 PROCEDURE — 93505 ENDOMYOCARDIAL BIOPSY: CPT

## 2018-05-23 PROCEDURE — C1769: CPT

## 2018-05-23 PROCEDURE — 83690 ASSAY OF LIPASE: CPT

## 2018-05-23 PROCEDURE — 71045 X-RAY EXAM CHEST 1 VIEW: CPT | Mod: 26

## 2018-05-23 RX ORDER — PREDNISONE 5 MG/1
5 TABLET ORAL TWICE DAILY
Refills: 0 | Status: COMPLETED | COMMUNITY
Start: 2018-05-10 | End: 2018-05-23

## 2018-05-23 RX ORDER — HYDROCHLOROTHIAZIDE 25 MG
25 TABLET ORAL DAILY
Qty: 0 | Refills: 0 | Status: DISCONTINUED | OUTPATIENT
Start: 2018-05-23 | End: 2018-05-26

## 2018-05-23 RX ORDER — INSULIN LISPRO 100/ML
6 VIAL (ML) SUBCUTANEOUS
Qty: 0 | Refills: 0 | Status: DISCONTINUED | OUTPATIENT
Start: 2018-05-23 | End: 2018-05-25

## 2018-05-23 RX ORDER — INSULIN HUMAN 100 [IU]/ML
4 INJECTION, SOLUTION SUBCUTANEOUS
Qty: 100 | Refills: 0 | Status: DISCONTINUED | OUTPATIENT
Start: 2018-05-23 | End: 2018-05-25

## 2018-05-23 RX ORDER — AMLODIPINE BESYLATE 2.5 MG/1
5 TABLET ORAL DAILY
Qty: 0 | Refills: 0 | Status: DISCONTINUED | OUTPATIENT
Start: 2018-05-23 | End: 2018-05-25

## 2018-05-23 RX ORDER — ATORVASTATIN CALCIUM 80 MG/1
10 TABLET, FILM COATED ORAL AT BEDTIME
Qty: 0 | Refills: 0 | Status: DISCONTINUED | OUTPATIENT
Start: 2018-05-23 | End: 2018-06-04

## 2018-05-23 RX ORDER — ATOVAQUONE 750 MG/5ML
1500 SUSPENSION ORAL DAILY
Qty: 0 | Refills: 0 | Status: DISCONTINUED | OUTPATIENT
Start: 2018-05-23 | End: 2018-06-06

## 2018-05-23 RX ORDER — TACROLIMUS 5 MG/1
5 CAPSULE ORAL ONCE
Qty: 0 | Refills: 0 | Status: DISCONTINUED | OUTPATIENT
Start: 2018-05-23 | End: 2018-05-23

## 2018-05-23 RX ORDER — INSULIN HUMAN 100 [IU]/ML
6 INJECTION, SOLUTION SUBCUTANEOUS ONCE
Qty: 0 | Refills: 0 | Status: COMPLETED | OUTPATIENT
Start: 2018-05-23 | End: 2018-05-23

## 2018-05-23 RX ORDER — MAGNESIUM OXIDE 400 MG ORAL TABLET 241.3 MG
400 TABLET ORAL EVERY 12 HOURS
Qty: 0 | Refills: 0 | Status: DISCONTINUED | OUTPATIENT
Start: 2018-05-23 | End: 2018-06-06

## 2018-05-23 RX ORDER — VALGANCICLOVIR 450 MG/1
450 TABLET, FILM COATED ORAL
Qty: 0 | Refills: 0 | Status: DISCONTINUED | OUTPATIENT
Start: 2018-05-23 | End: 2018-05-25

## 2018-05-23 RX ORDER — SODIUM CHLORIDE 9 MG/ML
500 INJECTION INTRAMUSCULAR; INTRAVENOUS; SUBCUTANEOUS ONCE
Qty: 0 | Refills: 0 | Status: COMPLETED | OUTPATIENT
Start: 2018-05-23 | End: 2018-05-23

## 2018-05-23 RX ORDER — DILTIAZEM HCL 120 MG
240 CAPSULE, EXT RELEASE 24 HR ORAL DAILY
Qty: 0 | Refills: 0 | Status: DISCONTINUED | OUTPATIENT
Start: 2018-05-23 | End: 2018-05-25

## 2018-05-23 RX ORDER — INSULIN LISPRO 100/ML
VIAL (ML) SUBCUTANEOUS
Qty: 0 | Refills: 0 | Status: DISCONTINUED | OUTPATIENT
Start: 2018-05-23 | End: 2018-05-31

## 2018-05-23 RX ORDER — SODIUM CHLORIDE 9 MG/ML
1000 INJECTION INTRAMUSCULAR; INTRAVENOUS; SUBCUTANEOUS ONCE
Qty: 0 | Refills: 0 | Status: COMPLETED | OUTPATIENT
Start: 2018-05-23 | End: 2018-05-23

## 2018-05-23 RX ORDER — TACROLIMUS 5 MG/1
6 CAPSULE ORAL
Qty: 0 | Refills: 0 | Status: DISCONTINUED | OUTPATIENT
Start: 2018-05-24 | End: 2018-05-30

## 2018-05-23 RX ORDER — ASPIRIN/CALCIUM CARB/MAGNESIUM 324 MG
81 TABLET ORAL DAILY
Qty: 0 | Refills: 0 | Status: DISCONTINUED | OUTPATIENT
Start: 2018-05-23 | End: 2018-06-06

## 2018-05-23 RX ORDER — NYSTATIN 500MM UNIT
500000 POWDER (EA) MISCELLANEOUS EVERY 6 HOURS
Qty: 0 | Refills: 0 | Status: DISCONTINUED | OUTPATIENT
Start: 2018-05-23 | End: 2018-06-05

## 2018-05-23 RX ORDER — FAMOTIDINE 10 MG/ML
20 INJECTION INTRAVENOUS
Qty: 0 | Refills: 0 | Status: DISCONTINUED | OUTPATIENT
Start: 2018-05-23 | End: 2018-06-06

## 2018-05-23 RX ORDER — INSULIN GLARGINE 100 [IU]/ML
12 INJECTION, SOLUTION SUBCUTANEOUS AT BEDTIME
Qty: 0 | Refills: 0 | Status: DISCONTINUED | OUTPATIENT
Start: 2018-05-23 | End: 2018-05-25

## 2018-05-23 RX ORDER — TACROLIMUS 5 MG/1
4 CAPSULE ORAL ONCE
Qty: 0 | Refills: 0 | Status: COMPLETED | OUTPATIENT
Start: 2018-05-23 | End: 2018-05-23

## 2018-05-23 RX ORDER — SODIUM BICARBONATE 1 MEQ/ML
1300 SYRINGE (ML) INTRAVENOUS EVERY 8 HOURS
Qty: 0 | Refills: 0 | Status: DISCONTINUED | OUTPATIENT
Start: 2018-05-23 | End: 2018-06-06

## 2018-05-23 RX ORDER — SODIUM CHLORIDE 9 MG/ML
3 INJECTION INTRAMUSCULAR; INTRAVENOUS; SUBCUTANEOUS EVERY 8 HOURS
Qty: 0 | Refills: 0 | Status: DISCONTINUED | OUTPATIENT
Start: 2018-05-23 | End: 2018-06-06

## 2018-05-23 RX ORDER — SODIUM CHLORIDE 9 MG/ML
1000 INJECTION INTRAMUSCULAR; INTRAVENOUS; SUBCUTANEOUS
Qty: 0 | Refills: 0 | Status: DISCONTINUED | OUTPATIENT
Start: 2018-05-23 | End: 2018-05-23

## 2018-05-23 RX ORDER — TACROLIMUS 5 MG/1
7 CAPSULE ORAL
Qty: 0 | Refills: 0 | Status: DISCONTINUED | OUTPATIENT
Start: 2018-05-23 | End: 2018-05-23

## 2018-05-23 RX ORDER — ENOXAPARIN SODIUM 100 MG/ML
80 INJECTION SUBCUTANEOUS
Qty: 0 | Refills: 0 | Status: DISCONTINUED | OUTPATIENT
Start: 2018-05-23 | End: 2018-05-25

## 2018-05-23 RX ORDER — INSULIN LISPRO 100/ML
VIAL (ML) SUBCUTANEOUS AT BEDTIME
Qty: 0 | Refills: 0 | Status: DISCONTINUED | OUTPATIENT
Start: 2018-05-23 | End: 2018-06-06

## 2018-05-23 RX ORDER — MYCOPHENOLATE MOFETIL 250 MG/1
1000 CAPSULE ORAL
Qty: 0 | Refills: 0 | Status: DISCONTINUED | OUTPATIENT
Start: 2018-05-23 | End: 2018-05-25

## 2018-05-23 RX ORDER — SODIUM CHLORIDE 9 MG/ML
1000 INJECTION INTRAMUSCULAR; INTRAVENOUS; SUBCUTANEOUS
Qty: 0 | Refills: 0 | Status: DISCONTINUED | OUTPATIENT
Start: 2018-05-23 | End: 2018-05-24

## 2018-05-23 RX ADMIN — Medication 500000 UNIT(S): at 20:15

## 2018-05-23 RX ADMIN — VALGANCICLOVIR 450 MILLIGRAM(S): 450 TABLET, FILM COATED ORAL at 20:09

## 2018-05-23 RX ADMIN — INSULIN HUMAN 4 UNIT(S)/HR: 100 INJECTION, SOLUTION SUBCUTANEOUS at 19:40

## 2018-05-23 RX ADMIN — Medication 1 APPLICATION(S): at 20:15

## 2018-05-23 RX ADMIN — MAGNESIUM OXIDE 400 MG ORAL TABLET 400 MILLIGRAM(S): 241.3 TABLET ORAL at 20:10

## 2018-05-23 RX ADMIN — ENOXAPARIN SODIUM 80 MILLIGRAM(S): 100 INJECTION SUBCUTANEOUS at 20:13

## 2018-05-23 RX ADMIN — Medication 2: at 22:22

## 2018-05-23 RX ADMIN — Medication 7.5 MILLIGRAM(S): at 20:12

## 2018-05-23 RX ADMIN — TACROLIMUS 4 MILLIGRAM(S): 5 CAPSULE ORAL at 20:10

## 2018-05-23 RX ADMIN — Medication 2 TABLET(S): at 23:12

## 2018-05-23 RX ADMIN — FAMOTIDINE 20 MILLIGRAM(S): 10 INJECTION INTRAVENOUS at 20:09

## 2018-05-23 RX ADMIN — INSULIN HUMAN 6 UNIT(S): 100 INJECTION, SOLUTION SUBCUTANEOUS at 17:42

## 2018-05-23 RX ADMIN — MYCOPHENOLATE MOFETIL 1000 MILLIGRAM(S): 250 CAPSULE ORAL at 20:10

## 2018-05-23 RX ADMIN — SODIUM CHLORIDE 3 MILLILITER(S): 9 INJECTION INTRAMUSCULAR; INTRAVENOUS; SUBCUTANEOUS at 22:14

## 2018-05-23 RX ADMIN — Medication 1 TABLET(S): at 20:16

## 2018-05-23 RX ADMIN — INSULIN GLARGINE 12 UNIT(S): 100 INJECTION, SOLUTION SUBCUTANEOUS at 22:23

## 2018-05-23 RX ADMIN — Medication 1300 MILLIGRAM(S): at 23:18

## 2018-05-23 RX ADMIN — SODIUM CHLORIDE 1000 MILLILITER(S): 9 INJECTION INTRAMUSCULAR; INTRAVENOUS; SUBCUTANEOUS at 17:02

## 2018-05-23 RX ADMIN — ATORVASTATIN CALCIUM 10 MILLIGRAM(S): 80 TABLET, FILM COATED ORAL at 22:22

## 2018-05-23 RX ADMIN — Medication 500000 UNIT(S): at 23:13

## 2018-05-23 RX ADMIN — SODIUM CHLORIDE 1000 MILLILITER(S): 9 INJECTION INTRAMUSCULAR; INTRAVENOUS; SUBCUTANEOUS at 20:05

## 2018-05-23 NOTE — ED ADULT NURSE REASSESSMENT NOTE - NS ED NURSE REASSESS COMMENT FT1
Pt received bed assignment. Pt aware of bed assignment. Pt AAOx3, NAD, resp nonlabored, resting comfortably in bed, pt denies any changes. Pt denies any sx. Pt denies headache, dizziness, chest pain, palpitations, fevers, chills, weakness at this time. Pt on portable tele box. Repeat FS after 2L NS and IV insulin performed was MD SHARON Aware. Safety maintained. Report called to 2 Gonzalo RN. Pt awaiting transport to floor.

## 2018-05-23 NOTE — CONSULT NOTE ADULT - SUBJECTIVE AND OBJECTIVE BOX
HPI:  68 y/om M w/     PAST MEDICAL & SURGICAL HISTORY:  DVT of upper extremity (deep vein thrombosis)  Hepatitis C virus  GIB (gastrointestinal bleeding)  Ventricular fibrillation: s/p AICD  PAF (paroxysmal atrial fibrillation): on xarelto  Non-Ischemic Cardiomyopathy  SVT (Supraventricular Tachycardia)  HTN  CHF (Congestive Heart Failure)  H/O heart transplant: 2/2018  LVAD (left ventricular assist device) present  Status post left hip replacement  History of Prior Ablation Treatment: for afib  AICD (Automatic Cardioverter/Defibrillator) Present: [employer] with 1 [employer] lead4/1/09- explanted and replaced with [employer] 2 leads on 9/2/09      FAMILY HISTORY:  No pertinent family history in first degree relatives      Social History:    Outpatient Medications:    MEDICATIONS  (STANDING):  sodium chloride 0.9% Bolus 1000 milliLiter(s) IV Bolus once  sodium chloride 0.9% Bolus 1000 milliLiter(s) IV Bolus once    MEDICATIONS  (PRN):      Allergies    No Known Allergies    Intolerances      Review of Systems:  Constitutional: No fever, No change in weight  Eyes: No blurry vision  Neuro: No headache, No paresthesias  HEENT: No throat pain  Cardiovascular: No chest pain  Respiratory: No SOB  GI: No nausea or vomiting  : No polyuria  Skin: no rash  Psych: no depression  Endocrine: No polydipsia, No heat or cold intolerance, rest as noted in HPI  Hem/lymph: no swelling    All other review of systems negative      PHYSICAL EXAM:  VITALS: T(C): --  T(F): --  HR: 103 (05-23-18 @ 16:15) (103 - 103)  BP: 117/94 (05-23-18 @ 16:15) (117/94 - 117/94)  RR:  (20 - 20)  SpO2:  (100% - 100%)  Wt(kg): --  GENERAL: NAD at this time  EYES: No proptosis, EOMI  HEENT:  Atraumatic, Normocephalic,   THYROID: Normal size, no palpable nodules  RESPIRATORY: Clear to auscultation bilaterally, full excursion, non-labored  CARDIOVASCULAR: Regular rhythm; No murmurs; no peripheral edema  GI: Soft, nontender, non distended, normal bowel sounds  SKIN: Dry, intact, No rashes or lesions  MUSCULOSKELETAL: normal strength  NEURO: follows commands, no tremor, normal reflexes  PSYCH: Alert and oriented x 3, normal affect, normal mood  CUSHING'S SIGNS: no striae      POCT Blood Glucose.: >600 mg/dL (05-23-18 @ 16:03)                  Thyroid Function Tests:      Hemoglobin A1C, Whole Blood: 5.3 % [4.0 - 5.6] (03-18-18 @ 11:20)        Radiology: HPI:  67 y/o M w/ hx of heart transplant 1/2018 now on prednisone and immunosuppresants with recent polyuria, polydipsia, dry mouth and blurry vision. He was drinking juice and regular soda to relieve his thirst. No prior hx of diabetes. Reports never on diabetes medications. No reported family hx of diabetes. Nonadherent to carb consistent diet. Does not have a glucometer. No reported hypoglycemia. Now presenting with glucose > 600.     PAST MEDICAL & SURGICAL HISTORY:  DVT of upper extremity (deep vein thrombosis)  Hepatitis C virus  GIB (gastrointestinal bleeding)  Ventricular fibrillation: s/p AICD  PAF (paroxysmal atrial fibrillation): on xarelto  Non-Ischemic Cardiomyopathy  SVT (Supraventricular Tachycardia)  HTN  CHF (Congestive Heart Failure)  H/O heart transplant: 2/2018  LVAD (left ventricular assist device) present  Status post left hip replacement  History of Prior Ablation Treatment: for afib  AICD (Automatic Cardioverter/Defibrillator) Present: [employer] with 1 [employer] lead4/1/09- explanted and replaced with [employer] 2 leads on 9/2/09      FAMILY HISTORY:  No pertinent family history in first degree relatives      Social History: +former tobacco use, no substance abuse    Outpatient Medications:  · 	sodium bicarbonate 650 mg oral tablet: 2 tab(s) orally 3 times a day  · 	Multiple Vitamins oral tablet: 1 tab(s) orally once a day  · 	Citracal + D 315 mg-250 intl units oral tablet: 2 tab(s) orally 2 times a day  · 	atovaquone 750 mg/5 mL oral suspension: 10 milliliter(s) orally once a day  · 	magnesium oxide 400 mg (241.3 mg elemental magnesium) oral tablet: 1 tab(s) orally 2 times a day at 0900 and 2100  · 	docusate sodium 100 mg oral capsule: 1 cap(s) orally 3 times a day, As needed, Constipation  · 	tacrolimus: 7 milligram(s) orally 2 times a day at 0800 and 2000  · 	CellCept 500 mg oral tablet: 2 tab(s) orally 2 times a day at 0800 and 2000  · 	famotidine 20 mg oral tablet: 1 tab(s) orally 2 times a day  · 	silver sulfADIAZINE 1% topical cream: 1 application topically 2 times a day  · 	dilTIAZem 240 mg/24 hours oral capsule, extended release: 1 cap(s) orally once a day  · 	amLODIPine 5 mg oral tablet: 1 tab(s) orally once a day  · 	valGANciclovir 450 mg oral tablet: 1 tab(s) orally 2 times a day at 0800 and 2000  · 	aspirin 81 mg oral delayed release tablet: 1 tab(s) orally once a day  · 	pravastatin 20 mg oral tablet: 1 tab(s) orally once a day (at bedtime)  · 	nystatin 100,000 units/mL oral suspension: 5 milliliter(s) orally every 6 hours  · 	enoxaparin: 80 milligram(s) subcutaneously every 12 hours - last dose 5/8 am  · 	predniSONE 2.5 mg oral tablet: 5 tab(s) orally 2 times a day at 0800 and 2000  · 	hydroCHLOROthiazide 25 mg oral tablet: 1 tab(s) orally once a day    MEDICATIONS  (STANDING):  sodium chloride 0.9% Bolus 1000 milliLiter(s) IV Bolus once  sodium chloride 0.9% Bolus 1000 milliLiter(s) IV Bolus once    MEDICATIONS  (PRN):      Allergies    No Known Allergies    Intolerances      Review of Systems:  Constitutional: No fever, No change in weight  Eyes: + blurry vision  Neuro: No headache, No paresthesias  HEENT: No throat pain  Cardiovascular: No chest pain  Respiratory: No SOB  GI: No nausea or vomiting  : + polyuria  Skin: no rash  Psych: no depression  Endocrine: + polydipsia, No heat or cold intolerance, rest as noted in HPI  Hem/lymph: no swelling    All other review of systems negative      PHYSICAL EXAM:  VITALS: T(C): --  T(F): --  HR: 103 (05-23-18 @ 16:15) (103 - 103)  BP: 117/94 (05-23-18 @ 16:15) (117/94 - 117/94)  RR:  (20 - 20)  SpO2:  (100% - 100%)  Wt(kg): --  GENERAL: NAD at this time  EYES: No proptosis, EOMI  HEENT:  Atraumatic, Normocephalic,   THYROID: Normal size, no palpable nodules  RESPIRATORY: Clear to auscultation bilaterally, full excursion, non-labored  CARDIOVASCULAR: Regular rhythm; no peripheral edema  GI: Soft, nontender, non distended, normal bowel sounds  SKIN: Dry, intact, No rashes or lesions  MUSCULOSKELETAL: normal strength  NEURO: follows commands  PSYCH: Alert and oriented x 3, normal affect, normal mood  CUSHING'S SIGNS: no striae      POCT Blood Glucose.: >600 mg/dL (05-23-18 @ 16:03)                  Thyroid Function Tests:      Hemoglobin A1C, Whole Blood: 5.3 % [4.0 - 5.6] (03-18-18 @ 11:20)        Radiology:

## 2018-05-23 NOTE — ED PROVIDER NOTE - MEDICAL DECISION MAKING DETAILS
HYPER GLYCEMIA new onset in heart transplant pt, IV fluid ressuicatinon, LAB TBA transplnt  Sammy Alvares MD, Facep

## 2018-05-23 NOTE — ED PROVIDER NOTE - OBJECTIVE STATEMENT
Hx from PT and EMS  68y male pmh,  Non-Ischemic Cardiomyopathy  sp Heart Transplant, PAF on xeralto SVT,CHF, HTN, DVT, GIB,HeP C, Ventricular fibrillation  s/p AICD PT comes to ed complains of  routine visit to Private Physician Today and Had glu 462 on 5/14, Today seen again glu was 700+and pt was called back to ED. PT complains of polyruia/polydipsia, No nvdc,cp.cough

## 2018-05-23 NOTE — CONSULT NOTE ADULT - ATTENDING COMMENTS
67yrs DCM, s/p HMII  complicated by pump thrombosis. s/p OHT 2.23.18. Hep C positive donor.   complicated by PGF with persistent C4D deposition and graft dysfunction that improved with PF/IVIG. s/p 2 doses of ritoxan (last 5.3)  Last RHC 5.9: Ra 12, RV 40/12, PA 43/20 29, PCWP 14. PA sat 58, CO 5.2. Biopsy no rejection. improved C4D deposition.  Last TTE 5.9: LA 3.7 LVEDD 4.6, LVEF 50, mild mR, borderline LV function. normal RV size decreased function.   outpatient meds:  FK , cardizem 240, cellcept 1g Q12, pred 12.5 bid, valcyte 450 bid, mepron 1500, asa 81, pravachol 20, lovanox 80 bid. HCTZ 25,   novasc 5, mg ox 800, Ca/Vit D, Famotidine 20. Nystatin SS, mavyret 100/40 tid. not treated for diabetes. Nabicarb 1300 tid  Now admitted with  on routine clinic labs. Admits to feeling weak and blurry vision and increase thirst and urination for past 4 days.   Admits to dietary indiscretion with carbs.   Afebrile, HR 98 120/90    05-23    119<LL>  |  78<L>  |  53<H>  ----------------------------<  904<HH>  5.3   |  26  |  1.87<H>    Ca    10.1      23 May 2018 16:39    TPro  7.3  /  Alb  4.2  /  TBili  0.7  /  DBili  x   /  AST  26  /  ALT  21  /  AlkPhos  101  05-23    VB.3, 59, 38, 28, 57   normal betahydroxybuterate. 67yrs DCM, s/p HMII  complicated by pump thrombosis. s/p OHT 2..18. Hep C positive donor.   complicated by PGF with persistent C4D deposition and graft dysfunction that improved with PF/IVIG. s/p 2 doses of ritoxan (last 5.3)  Last RHC 5.9: Ra 12, RV 40/12, PA 43/20 29, PCWP 14. PA sat 58, CO 5.2. Biopsy no rejection. improved C4D deposition.  Last TTE 5.9: LA 3.7 LVEDD 4.6, LVEF 50, mild mR, borderline LV function. normal RV size decreased function.   outpatient meds:  FK  cardizem 240, cellcept 1g Q12, pred 10 bid, valcyte 450 bid, mepron 1500, asa 81, pravachol 20, lovanox 80 bid. HCTZ 25,   novasc 5, mg ox 800, Ca/Vit D, Famotidine 20. Nystatin SS, mavyret 100/40 tid. not treated for diabetes. Nabicarb 1300 tid  Now admitted with  on routine clinic labs. Admits to feeling weak and blurry vision and increase thirst and urination for past 4 days.   Admits to dietary indiscretion with carbs.   Afebrile, HR 98 120/90    05-23    119<LL>  |  78<L>  |  53<H>  ----------------------------<  904<HH>  5.3   |  26  |  1.87<H>    Ca    10.1      23 May 2018 16:39    TPro  7.3  /  Alb  4.2  /  TBili  0.7  /  DBili  x   /  AST  26  /  ALT  21  /  AlkPhos  101  05-23  Prograf 17.8     VB.3, 59, 38, 28, 57   normal betahydroxybuterate.  Seen by endocrine who recommends lantis 12iu bed time, 6iu humolog before meals and sliding scale. IV fluids normal saline.  Suggest:  Resume outpatient transplant medications with the following changes.  Make prograf 4 tonight and 6 bid from tomorrow.   make prednisone 7.5 bid.   fluids and insulin as per endocrine.   blood and urine cultures and CXR.   Marvin Campbell 67yrs DCM, s/p HMII  complicated by pump thrombosis. s/p OHT 2..18. Hep C positive donor.   complicated by PGF with persistent C4D deposition and graft dysfunction that improved with PF/IVIG. s/p 2 doses of ritoxan (last 5.3)  Last RHC 5.9: Ra 12, RV 40/12, PA 43/20 29, PCWP 14. PA sat 58, CO 5.2. Biopsy no rejection. improved C4D deposition.  Last TTE 5.9: LA 3.7 LVEDD 4.6, LVEF 50, mild mR, borderline LV function. normal RV size decreased function.   outpatient meds:  FK  cardizem 240, cellcept 1g Q12, pred 10 bid, valcyte 450 bid, mepron 1500, asa 81, pravachol 20, lovanox 80 bid. HCTZ 25,   novasc 5, mg ox 800, Ca/Vit D, Famotidine 20. Nystatin SS, mavyret 100/40 tid. not treated for diabetes. Nabicarb 1300 tid  Now admitted with  on routine clinic labs. Admits to feeling weak and blurry vision and increase thirst and urination for past 4 days.   Admits to dietary indiscretion with carbs.   Afebrile, HR 98 120/90  holosystolic murmer across the entire precordium varies with respiration.       119<LL>  |  78<L>  |  53<H>  ----------------------------<  904<HH>  5.3   |  26  |  1.87<H>    Ca    10.1      23 May 2018 16:39    TPro  7.3  /  Alb  4.2  /  TBili  0.7  /  DBili  x   /  AST  26  /  ALT  21  /  AlkPhos  101  -  Prograf 17.8     VB.3, 59, 38, 28, 57   normal betahydroxybuterate.  Seen by endocrine who recommends lantis 12iu bed time, 6iu humolog before meals and sliding scale. IV fluids normal saline.  Suggest:  Resume outpatient transplant medications with the following changes.  Make prograf 4 tonight and 6 bid from tomorrow.   make prednisone 7.5 bid.   fluids and insulin as per endocrine.   blood and urine cultures and CXR.   TTE for new cultures amber Campbell

## 2018-05-23 NOTE — H&P ADULT - PROBLEM SELECTOR PLAN 3
-Pt on mavyret 100-40 mg po x3 tablets daily for Hep C treatment  -Needs to bring in from home, pharmacy does not carry

## 2018-05-23 NOTE — CONSULT NOTE ADULT - ASSESSMENT
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 Now presenting with hyperglycemia likely 2/2 to steroids vs. prograf.

## 2018-05-23 NOTE — H&P ADULT - PROBLEM SELECTOR PLAN 2
-Heart Transplant services following  -Continue home medications- cardizem 240, cellcept 1g Q12, pred 10 bid, valcyte 450 bid, mepron 1500, asa 81, pravachol 20, sq lovanox 80 bid. HCTZ 25,   novasc 5, mg ox 800, Ca/Vit D, Famotidine 20. Nystatin SS, Nabicarb 1300 tid  -Tacrolimus 4mg tonight and 6mg BID starting tomorrow per HF  -Check TTE in AM

## 2018-05-23 NOTE — ED PROVIDER NOTE - PROGRESS NOTE DETAILS
[place] PGY2 - seen with Dr. Alvares, patient HD stable and feeling well. CT surg at bedside. Glucose found to be >700 on am labs. pending repeat labs

## 2018-05-23 NOTE — ED ADULT NURSE NOTE - PMH
CHF (Congestive Heart Failure)    DVT of upper extremity (deep vein thrombosis)    GIB (gastrointestinal bleeding)    Hepatitis C virus    HTN    Non-Ischemic Cardiomyopathy    PAF (paroxysmal atrial fibrillation)  on xarelto  SVT (Supraventricular Tachycardia)    Ventricular fibrillation  s/p AICD

## 2018-05-23 NOTE — H&P ADULT - HISTORY OF PRESENT ILLNESS
67M with ACC/AHA stage D chronic systolic heart failure due to NICM s/p HM2 LVAD 6/17 c/b pump thrombosis now s/p heart transplant on 2/23 (CMV D-/R+ and Toxo D-/R+), with post-op course c/b primary graft dysfunction, initially thought to be immune-mediated due to positive B-cell flow (negative CDC cross match) and received plasmapheresis/IVIg x2 with improvement in LV function. Post-transplant course has been complicated by bilateral IJ/subclavian thrombi and he has a persistent small  right sided pleural effusion as well as chronic renal failure, thought to be related to Prograf.  Echo on 4/3 showed evidence of slight worsened LV strain, LVEF of 52%, and slightly diminished right ventricular systolic function.  RHC on 4/4 showed elevated biventricular filling pressures with normal cardiac output. Biopsy showed 1R/1A cellular rejection. C4D continues to be diffusely positive. DSA is unremarkable.  He continued to make progress and was discharged to home on 4/10.  RHC on 4/19 for RHC with biopsy revealing no rejection  and presented on 5/3 for infusion of Rituxan second dose for suspected Graft rejection.  Last RHC 5/9 for biopsy and echo.  Pt presents to clinic for routine lab check up and c/o generalized malaise with frequent urination and thirst. Glucose on BMP noted to be >700 and pt admitted for further workup r/o HHNK.

## 2018-05-23 NOTE — CONSULT NOTE ADULT - PROBLEM SELECTOR RECOMMENDATION 2
Likely prerenal given hyperglycemia and increased urination.   - Trend Cr  - avoid nephrotoxic agents.

## 2018-05-23 NOTE — H&P ADULT - ASSESSMENT
66 y/o Male with ACC/AHA stage D chronic systolic heart failure s/p heart transplant on 2/23 now presenting with hyperglycemia possibly steroid induced. 66 y/o Male with ACC/AHA stage D chronic systolic heart failure s/p heart transplant on 2/23 with no known history or treatment for diabetes (A1c 5.3), now presenting with hyperglycemia, BS>600 possibly steroid induced.

## 2018-05-23 NOTE — H&P ADULT - PROBLEM SELECTOR PLAN 1
Endocrine consulted  Transfer pt to stepdown for insulin infusion  Decrease prednisone dose to 7.5mg BID per HF Endocrine consulted  Transfer pt to stepdown for insulin infusion  Decrease prednisone dose to 7.5mg BID per HF  Check Blood culture x2, urine culture  Start Normal Saline for hyponatremia  Repeat BMP in 6-8 hours

## 2018-05-23 NOTE — CONSULT NOTE ADULT - PROBLEM SELECTOR RECOMMENDATION 3
Immunosuppression:   - C/w Prograf 7mg BID  - Check Prograf level 30min before AM dose. (do not hold dose pending level)  - c/w Cellcept 1000mg q12h,, prednisone 7.5mg daily  - PPX with Atovaquone 1500mg daily, Nystatin,  Valcyte 450mg BID  - c/w ASA and Pravachol

## 2018-05-23 NOTE — H&P ADULT - PSH
AICD (Automatic Cardioverter/Defibrillator) Present  [employer] [place] with 1 [employer] [place] lead4/1/09- explanted and replaced with [employer] 2 leads on 9/2/09  H/O heart transplant  2/2018  History of Prior Ablation Treatment  for afib  LVAD (left ventricular assist device) present    Status post left hip replacement

## 2018-05-23 NOTE — CONSULT NOTE ADULT - SUBJECTIVE AND OBJECTIVE BOX
Patient seen and evaluated at bedside    Chief Complaint: Hyperglycemia    HPI:  67M with ACC/AHA stage D chronic systolic heart failure due to NICM s/p HM2 LVAD 6/17 c/b pump thrombosis now s/p heart transplant on 2/23 (CMV D-/R+ and Toxo D-/R+), with post-op course c/b primary graft dysfunction, initially thought to be immune-mediated due to positive B-cell flow (negative CDC cross match) and received plasmapheresis/IVIg x2 with improvement in LV function. Post-transplant course has been complicated by bilateral IJ/subclavian thrombi and he has a persistent small  right sided pleural effusion as well as chronic renal failure, thought to be related to Prograf.  Echo on 4/3 showed evidence of slight worsened LV strain, LVEF of 52%, and slightly diminished right ventricular systolic function.  RHC on 4/4 showed elevated biventricular filling pressures with normal cardiac output. Biopsy showed 1R/1A cellular rejection. C4D continues to be diffusely positive. DSA is unremarkable.  He continued to make progress and was discharged to home on 4/10.  RHC on 4/19 for RHC with biopsy revealing no rejection  and presented on 5/3 for infusion of Rituxan second dose for suspected Graft rejection.  Last RHC 5/9 for biopsy and echo.  Pt presents to clinic for routine lab check up and c/o generalized malaise with frequent urination and thirst. Glucose on BMP noted to be >700 and pt admitted for further workup r/o HHNK. (23 May 2018 16:57)      PMHx:   DVT of upper extremity (deep vein thrombosis)  Hepatitis C virus  GIB (gastrointestinal bleeding)  H/O prior ablation treatment  Ventricular fibrillation  PAF (paroxysmal atrial fibrillation)  Non-Ischemic Cardiomyopathy  SVT (Supraventricular Tachycardia)  HTN  CHF (Congestive Heart Failure)      PSHx:   H/O heart transplant  LVAD (left ventricular assist device) present  Status post left hip replacement  Hypertension  Hypertension  History of Prior Ablation Treatment  AICD (Automatic Cardioverter/Defibrillator) Present      Allergies:  No Known Allergies      Home Meds:    Current Medications:   amLODIPine   Tablet 5 milliGRAM(s) Oral daily  aspirin enteric coated 81 milliGRAM(s) Oral daily  atorvastatin 10 milliGRAM(s) Oral at bedtime  atovaquone Suspension 1500 milliGRAM(s) Oral daily  calcium carbonate 1250 mG + Vitamin D (OsCal 500 + D) 2 Tablet(s) Oral two times a day  diltiazem    milliGRAM(s) Oral daily  enoxaparin Injectable 80 milliGRAM(s) SubCutaneous two times a day  famotidine    Tablet 20 milliGRAM(s) Oral two times a day  hydrochlorothiazide 25 milliGRAM(s) Oral daily  insulin glargine Injectable (LANTUS) 12 Unit(s) SubCutaneous at bedtime  insulin lispro (HumaLOG) corrective regimen sliding scale   SubCutaneous three times a day before meals  insulin lispro (HumaLOG) corrective regimen sliding scale   SubCutaneous at bedtime  insulin lispro Injectable (HumaLOG) 6 Unit(s) SubCutaneous three times a day before meals  insulin regular  human recombinant. 6 Unit(s) IV Push once  magnesium oxide 400 milliGRAM(s) Oral every 12 hours  multivitamin 1 Tablet(s) Oral daily  mycophenolate mofetil 1000 milliGRAM(s) Oral two times a day  nystatin    Suspension 616061 Unit(s) Oral every 6 hours  predniSONE   Tablet 7.5 milliGRAM(s) Oral two times a day  silver sulfADIAZINE 1% Cream 1 Application(s) Topical two times a day  sodium chloride 0.9% lock flush 3 milliLiter(s) IV Push every 8 hours  tacrolimus 7 milliGRAM(s) Oral two times a day  valGANciclovir 450 milliGRAM(s) Oral two times a day      FAMILY HISTORY:  No pertinent family history in first degree relatives      Social History:  Smoking History:  Alcohol Use:  Drug Use:    Review of Systems:  REVIEW OF SYSTEMS:    CONSTITUTIONAL: No weakness, fevers or chills  EYES/ENT: No visual changes;  No dysphagia  NECK: No pain or stiffness  RESPIRATORY: No cough, wheezing, hemoptysis; No shortness of breath  CARDIOVASCULAR: No chest pain or palpitations; No lower extremity edema  GASTROINTESTINAL: No abdominal or epigastric pain. No nausea, vomiting, or hematemesis; No diarrhea or constipation. No melena or hematochezia.  BACK: No back pain  GENITOURINARY: No dysuria, frequency or hematuria  NEUROLOGICAL: No numbness or weakness  SKIN: No itching, burning, rashes, or lesions   All other review of systems is negative unless indicated above.    Physical Exam:  T(F): 98.7 (05-23), Max: 98.7 (05-23)  HR: 98 (05-23) (98 - 103)  BP: 121/95 (05-23) (117/94 - 121/95)  RR: 20 (05-23)  SpO2: 97% (05-23)  GENERAL: No acute distress, well-developed  HEAD:  Atraumatic, Normocephalic  ENT: EOMI, PERRLA, conjunctiva and sclera clear, Neck supple, No JVD, moist mucosa  CHEST/LUNG: Clear to auscultation bilaterally; No wheeze, equal breath sounds bilaterally   BACK: No spinal tenderness  HEART: Regular rate and rhythm; No murmurs, rubs, or gallops  ABDOMEN: Soft, Nontender, Nondistended; Bowel sounds present  EXTREMITIES:  No clubbing, cyanosis, or edema  PSYCH: Nl behavior, nl affect  NEUROLOGY: AAOx3, non-focal, cranial nerves intact  SKIN: Normal color, No rashes or lesions  LINES:    Cardiovascular Diagnostic Testing:    ECG: Personally reviewed:    Echo: Personally reviewed:    Stress Testing:    Cath:    Imaging:    CXR: Personally reviewed    Labs: Personally reviewed                        13.0   4.9   )-----------( 220      ( 23 May 2018 16:39 )             38.9     05-23    119<LL>  |  78<L>  |  53<H>  ----------------------------<  904<HH>  5.3   |  26  |  1.87<H>    Ca    10.1      23 May 2018 16:39    TPro  7.3  /  Alb  4.2  /  TBili  0.7  /  DBili  x   /  AST  26  /  ALT  21  /  AlkPhos  101  05-23    PT/INR - ( 23 May 2018 16:39 )   PT: 10.8 sec;   INR: 1.00 ratio         PTT - ( 23 May 2018 16:39 )  PTT:32.2 sec Patient seen and evaluated at bedside    Chief Complaint: Hyperglycemia    HPI:  67M with ACC/AHA stage D chronic systolic heart failure due to NICM s/p HM2 LVAD  c/b pump thrombosis now s/p heart transplant on  (CMV D-/R+ and Toxo D-/R+), with post-op course c/b primary graft dysfunction, initially thought to be immune-mediated due to positive B-cell flow (negative CDC cross match) and received plasmapheresis/IVIg x2 with improvement in LV function. Post-transplant course has been complicated by bilateral IJ/subclavian thrombi and he has a persistent small  right sided pleural effusion as well as chronic renal failure, thought to be related to Prograf.  Echo on 4/3 showed evidence of slight worsened LV strain, LVEF of 52%, and slightly diminished right ventricular systolic function.  RHC on  showed elevated biventricular filling pressures with normal cardiac output. Biopsy showed 1R/1A cellular rejection. C4D continues to be diffusely positive. DSA is unremarkable.  He continued to make progress and was discharged to home on 4/10.  RHC on  for RHC with biopsy revealing no rejection  and presented on 5/3 for infusion of Rituxan second dose for suspected Graft rejection.  Last RHC  for biopsy and echo.  Pt states he has had dietary indiscretion drinking sodas, juices, eating rice and bread. For the past four days has noticed increased thirst, urination, blurry vision, and weakness.   Was seen in outpt HF clinic this am and had labs drawn. Glucose noted to be >700 and pt told to return to ED for further mgmt. Currently denies, cp, sob, n/v/d, fevers or chills. States has been compliant with all medications.       PMHx:   DVT of upper extremity (deep vein thrombosis)  Hepatitis C virus  GIB (gastrointestinal bleeding)  H/O prior ablation treatment  Ventricular fibrillation  PAF (paroxysmal atrial fibrillation)  Non-Ischemic Cardiomyopathy  SVT (Supraventricular Tachycardia)  HTN  CHF (Congestive Heart Failure)      PSHx:   H/O heart transplant  LVAD (left ventricular assist device) present  Status post left hip replacement  Hypertension  Hypertension  History of Prior Ablation Treatment  AICD (Automatic Cardioverter/Defibrillator) Present      Allergies:  No Known Allergies      Home Meds:    Current Medications:   amLODIPine   Tablet 5 milliGRAM(s) Oral daily  aspirin enteric coated 81 milliGRAM(s) Oral daily  atorvastatin 10 milliGRAM(s) Oral at bedtime  atovaquone Suspension 1500 milliGRAM(s) Oral daily  calcium carbonate 1250 mG + Vitamin D (OsCal 500 + D) 2 Tablet(s) Oral two times a day  diltiazem    milliGRAM(s) Oral daily  enoxaparin Injectable 80 milliGRAM(s) SubCutaneous two times a day  famotidine    Tablet 20 milliGRAM(s) Oral two times a day  hydrochlorothiazide 25 milliGRAM(s) Oral daily  insulin glargine Injectable (LANTUS) 12 Unit(s) SubCutaneous at bedtime  insulin lispro (HumaLOG) corrective regimen sliding scale   SubCutaneous three times a day before meals  insulin lispro (HumaLOG) corrective regimen sliding scale   SubCutaneous at bedtime  insulin lispro Injectable (HumaLOG) 6 Unit(s) SubCutaneous three times a day before meals  insulin regular  human recombinant. 6 Unit(s) IV Push once  magnesium oxide 400 milliGRAM(s) Oral every 12 hours  multivitamin 1 Tablet(s) Oral daily  mycophenolate mofetil 1000 milliGRAM(s) Oral two times a day  nystatin    Suspension 379084 Unit(s) Oral every 6 hours  predniSONE   Tablet 7.5 milliGRAM(s) Oral two times a day  silver sulfADIAZINE 1% Cream 1 Application(s) Topical two times a day  sodium chloride 0.9% lock flush 3 milliLiter(s) IV Push every 8 hours  tacrolimus 7 milliGRAM(s) Oral two times a day  valGANciclovir 450 milliGRAM(s) Oral two times a day      FAMILY HISTORY:  No pertinent family history in first degree relatives      Social History:    Review of Systems:  REVIEW OF SYSTEMS:    CONSTITUTIONAL:  weakness, fevers or chills  EYES/ENT: Blurry vision  NECK: No pain or stiffness  RESPIRATORY: No cough, wheezing, hemoptysis; No shortness of breath  CARDIOVASCULAR: No chest pain or palpitations; No lower extremity edema  GASTROINTESTINAL: No abdominal or epigastric pain. No nausea, vomiting, or hematemesis; No diarrhea or constipation. No melena or hematochezia.  BACK: No back pain  GENITOURINARY: Urinary frequency  NEUROLOGICAL: No numbness or weakness  SKIN: No itching, burning, rashes, or lesions   All other review of systems is negative unless indicated above.    Physical Exam:  T(F): 98.7 (), Max: 98.7 ()  HR: 98 () (98 - 103)  BP: 121/95 () (117/94 - 121/95)  RR: 20 ()  SpO2: 97% ()  GENERAL: No acute distress, well-developed  HEAD:  Atraumatic, Normocephalic  ENT: EOMI, PERRLA, conjunctiva and sclera clear, Neck supple, No JVD, moist mucosa  CHEST/LUNG: Clear to auscultation bilaterally; No wheeze, equal breath sounds bilaterally   BACK: No spinal tenderness  HEART: medial sternotomy noted Regular rate and rhythm; +systolic murmur  ABDOMEN: Soft, Nontender, Nondistended; Bowel sounds present  EXTREMITIES:  No clubbing, cyanosis, or edema  PSYCH: Nl behavior, nl affect  NEUROLOGY: AAOx3, non-focal, cranial nerves intact  SKIN: Normal color, No rashes or lesions  LINES:    Cardiovascular Diagnostic Testing:    ECG: Personally reviewed: pending      Labs: Personally reviewed                        13.0   4.9   )-----------( 220      ( 23 May 2018 16:39 )             38.9         119<LL>  |  78<L>  |  53<H>  ----------------------------<  904<HH>  5.3   |  26  |  1.87<H> (baseline 1.1)    Ca    10.1      23 May 2018 16:39    TPro  7.3  /  Alb  4.2  /  TBili  0.7  /  DBili  x   /  AST  26  /  ALT  21  /  AlkPhos  101      PT/INR - ( 23 May 2018 16:39 )   PT: 10.8 sec;   INR: 1.00 ratio         PTT - ( 23 May 2018 16:39 )  PTT:32.2 sec        < from: Cardiac Cath Lab - Adult (18 @ 11:53) >  ressures:  -- Pulmonary Artery (S/D/M): 43/20/29  Pressures:  -- Pulmonary Capillary Wedge: 15/16/14  Pressures:  -- Right Atrium (a/v/M): 14/14/12  Pressures:  -- Right Ventricle (s/edp): 40/12/--  O2 Sats:  Baseline  O2 Sats:  - HR: 96  O2 Sats:  - Rhythm:  O2 Sats:  -- AO: 9.3/95/12.02  O2 Sats:  -- PA: 9.3/58.8/7.44  Outputs:  Baseline  Outputs:  -- CALCULATIONS[age]  Outputs:  -- CALCULATIONS: Body Surface Area: 1.85  Outputs:  -- CALCULATIONS: Height in cm: 173.00  Outputs:  -- CALCULATIONS: Sex: Male  Outputs:  -- CALCULATIONS: Weight in k.50  Outputs:  -- OUTPUTS: CO by Marino: 5.42  Outputs:  -- OUTPUTS: Marino cardiac index: 2.93    < end of copied text >    < from: Transthoracic Echocardiogram (18 @ 09:23) >  Dimensions:    Normal Values:  LA:     3.7    2.0 - 4.0 cm  Ao:     4.2    2.0 - 3.8 cm  SEPTUM: 0.9    0.6 - 1.2 cm  PWT:    1.2    0.6 - 1.1 cm  LVIDd:  4.6    3.0 - 5.6 cm  LVIDs:  3.3    1.8 - 4.0 cm  Derived variables:  LVMI: 92 g/m2  RWT: 0.52  Fractional short: 28 %  EF (Teicholtz): 50 %  ------------------------------------------------------------------------  Observations:  MitralValve: Normal mitral valve. Mild mitral  regurgitation.  Aortic Valve/Aorta: Normal trileaflet aortic valve. Minimal  aortic regurgitation.  Aortic Root: 4.2 cm.  Left Atrium: Dilated left atrium secondary to cardiac  transplant.  LA volume index = 58 cc/m2.  Left Ventricle: Overall borderline  left ventricular  systolic function. Septal motion consistent with prior  cardiac surgery. Increased relative wall thickness with  normal left ventricular mass index, consistent with  concentric left ventricular remodeling.  Right Heart: Normal right atrium. Normal right ventricular  size with decreased right ventricular systolic function.  Normal tricuspid valve. Mild tricuspid regurgitation.  Normal pulmonic valve. Mild pulmonic regurgitation.  Pericardium/Pleura: No pericardial effusion seen.  Hemodynamic: Estimated right atrial pressure is 8 mm Hg.  Estimated right ventricular systolic pressure equals 33 mm  Hg, assuming right atrial pressure equals 8 mm Hg,  consistent with normal pulmonary pressures.  ------------------------------------------------------------------------  Conclusions:  1. Dilated left atrium secondary to cardiac transplant.  LA  volume index = 58 cc/m2.  2. Increased relative wall thickness with normal left  ventricularmass index, consistent with concentric left  ventricular remodeling.  3. Overall borderline  left ventricular systolic function.  Septal motion consistent with prior cardiac surgery.  4. Normal right ventricular size with decreased right  ventricularsystolic function.    < end of copied text >

## 2018-05-23 NOTE — ED ADULT NURSE NOTE - OBJECTIVE STATEMENT
67 y/o M, PMH Hep C, HTN, DVT, CHF, PSH heart transplant 1/23/18 on steroids, BIBA from home for elevated glucose on labs. Pt was at Cardiologist's office for his weekly lab work, called 2 hours later and told his sugar was < 700 and to go to Hospital. Pt denies having Diabetes or being on any insulin/diabetes meds. Pt denies any sx. Denies chest pain, palpitations, SOB, dizziness at this time. Pt's distal R 3rd digit is necrotic. Cards MD at bedside in ED, pt on EKG machine with , as per Cards MDs at bedside pt will be waiting in ED while his room on [address] is cleaned. Safety maintained. 69 y/o M, PMH Hep C, HTN, DVT, CHF, PSH heart transplant 1/23/18 on steroids, BIBA from home for elevated glucose on labs. Pt was at Cardiologist's office for his weekly lab work, called 2 hours later and told his sugar was > 700 and to go to Hospital. Pt denies having Diabetes or being on any insulin/diabetes meds. Pt denies any sx. Denies chest pain, palpitations, SOB, dizziness at this time. Pt's distal R 3rd digit is necrotic. Cards MD at bedside in ED, pt on EKG machine with , as per Cards MDs at bedside pt will be waiting in ED while his room on [address] is cleaned. Safety maintained.

## 2018-05-23 NOTE — CONSULT NOTE ADULT - ASSESSMENT
69 y/o M w/ new onset diabetes after transplantation glucose > 600, HTN, HLD (high risk patient with high level decision-making)

## 2018-05-23 NOTE — H&P ADULT - NSHPPHYSICALEXAM_GEN_ALL_CORE
General: NAD  HEENT:  NC/AT  Neuro: A&Ox4, no focal deficits noted  Respiratory: B/L BS CTA, no wheeze, no rhonchi noted  Cardiovascular: RRR, normal S1S2, no murmur noted  GI: Abd soft, NT/ND, +BSx4Q +BM  Peripheral Vascular:  B/L LE neg edema, 2+ peripheral pulses, no cyanosis, varicosities/PVD noted  Musculoskeletal: B/L UE and LE 5/5 strength   Psychiatric: Normal mood, normal affect observed  Skin: Normal exam to inspection and palpation.

## 2018-05-23 NOTE — H&P ADULT - NSHPREVIEWOFSYSTEMS_GEN_ALL_CORE
REVIEW OF SYSTEMS:  CONSTITUTIONAL: +10 lb weight loss, +generalized fatigue, no fever/chills  EYES: No eye pain, visual disturbances, or discharge  ENMT:  No tinnitus, vertigo, dysphagia  RESPIRATORY: No cough, SOB, wheezing, chills or hemoptysis  CARDIOVASCULAR: No chest pain, palpitations, dizziness, or leg swelling  GASTROINTESTINAL: No abdominal pain. No nausea, vomiting, or diarrhea.  GENITOURINARY: No dysuria, frequency, hematuria, or incontinence.  +polyuria  NEUROLOGICAL: No headaches, memory loss, loss of strength, numbness, or tremors  SKIN: No itching, burning, rashes, or lesions   ENDOCRINE: No heat or cold intolerance. +polydipsia  MUSCULOSKELETAL: No joint pain or swelling

## 2018-05-24 DIAGNOSIS — R73.9 HYPERGLYCEMIA, UNSPECIFIED: ICD-10-CM

## 2018-05-24 LAB
ALBUMIN SERPL ELPH-MCNC: 3.6 G/DL — SIGNIFICANT CHANGE UP (ref 3.3–5)
ALP SERPL-CCNC: 65 U/L — SIGNIFICANT CHANGE UP (ref 40–120)
ALT FLD-CCNC: 21 U/L — SIGNIFICANT CHANGE UP (ref 10–45)
ANION GAP SERPL CALC-SCNC: 14 MMOL/L — SIGNIFICANT CHANGE UP (ref 5–17)
ANION GAP SERPL CALC-SCNC: 16 MMOL/L — SIGNIFICANT CHANGE UP (ref 5–17)
AST SERPL-CCNC: 42 U/L — HIGH (ref 10–40)
BILIRUB SERPL-MCNC: 0.8 MG/DL — SIGNIFICANT CHANGE UP (ref 0.2–1.2)
BUN SERPL-MCNC: 37 MG/DL — HIGH (ref 7–23)
BUN SERPL-MCNC: 41 MG/DL — HIGH (ref 7–23)
CALCIUM SERPL-MCNC: 9.2 MG/DL — SIGNIFICANT CHANGE UP (ref 8.4–10.5)
CALCIUM SERPL-MCNC: 9.4 MG/DL — SIGNIFICANT CHANGE UP (ref 8.4–10.5)
CHLORIDE SERPL-SCNC: 97 MMOL/L — SIGNIFICANT CHANGE UP (ref 96–108)
CHLORIDE SERPL-SCNC: 98 MMOL/L — SIGNIFICANT CHANGE UP (ref 96–108)
CO2 SERPL-SCNC: 19 MMOL/L — LOW (ref 22–31)
CO2 SERPL-SCNC: 22 MMOL/L — SIGNIFICANT CHANGE UP (ref 22–31)
CREAT SERPL-MCNC: 1.29 MG/DL — SIGNIFICANT CHANGE UP (ref 0.5–1.3)
CREAT SERPL-MCNC: 1.4 MG/DL — HIGH (ref 0.5–1.3)
GLUCOSE BLDC GLUCOMTR-MCNC: 101 MG/DL — HIGH (ref 70–99)
GLUCOSE BLDC GLUCOMTR-MCNC: 102 MG/DL — HIGH (ref 70–99)
GLUCOSE BLDC GLUCOMTR-MCNC: 105 MG/DL — HIGH (ref 70–99)
GLUCOSE BLDC GLUCOMTR-MCNC: 105 MG/DL — HIGH (ref 70–99)
GLUCOSE BLDC GLUCOMTR-MCNC: 113 MG/DL — HIGH (ref 70–99)
GLUCOSE BLDC GLUCOMTR-MCNC: 116 MG/DL — HIGH (ref 70–99)
GLUCOSE BLDC GLUCOMTR-MCNC: 127 MG/DL — HIGH (ref 70–99)
GLUCOSE BLDC GLUCOMTR-MCNC: 127 MG/DL — HIGH (ref 70–99)
GLUCOSE BLDC GLUCOMTR-MCNC: 131 MG/DL — HIGH (ref 70–99)
GLUCOSE BLDC GLUCOMTR-MCNC: 134 MG/DL — HIGH (ref 70–99)
GLUCOSE BLDC GLUCOMTR-MCNC: 136 MG/DL — HIGH (ref 70–99)
GLUCOSE BLDC GLUCOMTR-MCNC: 149 MG/DL — HIGH (ref 70–99)
GLUCOSE BLDC GLUCOMTR-MCNC: 150 MG/DL — HIGH (ref 70–99)
GLUCOSE BLDC GLUCOMTR-MCNC: 151 MG/DL — HIGH (ref 70–99)
GLUCOSE BLDC GLUCOMTR-MCNC: 84 MG/DL — SIGNIFICANT CHANGE UP (ref 70–99)
GLUCOSE BLDC GLUCOMTR-MCNC: 88 MG/DL — SIGNIFICANT CHANGE UP (ref 70–99)
GLUCOSE BLDC GLUCOMTR-MCNC: 90 MG/DL — SIGNIFICANT CHANGE UP (ref 70–99)
GLUCOSE BLDC GLUCOMTR-MCNC: 95 MG/DL — SIGNIFICANT CHANGE UP (ref 70–99)
GLUCOSE BLDC GLUCOMTR-MCNC: 98 MG/DL — SIGNIFICANT CHANGE UP (ref 70–99)
GLUCOSE SERPL-MCNC: 118 MG/DL — HIGH (ref 70–99)
GLUCOSE SERPL-MCNC: 135 MG/DL — HIGH (ref 70–99)
HCT VFR BLD CALC: 39.7 % — SIGNIFICANT CHANGE UP (ref 39–50)
HGB BLD-MCNC: 12.2 G/DL — LOW (ref 13–17)
MCHC RBC-ENTMCNC: 29 PG — SIGNIFICANT CHANGE UP (ref 27–34)
MCHC RBC-ENTMCNC: 30.8 GM/DL — LOW (ref 32–36)
MCV RBC AUTO: 94.2 FL — SIGNIFICANT CHANGE UP (ref 80–100)
OSMOLALITY UR: 493 MOS/KG — SIGNIFICANT CHANGE UP (ref 50–1200)
PLATELET # BLD AUTO: 146 K/UL — LOW (ref 150–400)
POTASSIUM SERPL-MCNC: 4.2 MMOL/L — SIGNIFICANT CHANGE UP (ref 3.5–5.3)
POTASSIUM SERPL-MCNC: 4.6 MMOL/L — SIGNIFICANT CHANGE UP (ref 3.5–5.3)
POTASSIUM SERPL-SCNC: 4.2 MMOL/L — SIGNIFICANT CHANGE UP (ref 3.5–5.3)
POTASSIUM SERPL-SCNC: 4.6 MMOL/L — SIGNIFICANT CHANGE UP (ref 3.5–5.3)
PROT SERPL-MCNC: 6.3 G/DL — SIGNIFICANT CHANGE UP (ref 6–8.3)
RBC # BLD: 4.21 M/UL — SIGNIFICANT CHANGE UP (ref 4.2–5.8)
RBC # FLD: 19.5 % — HIGH (ref 10.3–14.5)
SODIUM SERPL-SCNC: 132 MMOL/L — LOW (ref 135–145)
SODIUM SERPL-SCNC: 134 MMOL/L — LOW (ref 135–145)
T3 SERPL-MCNC: 61 NG/DL — LOW (ref 80–200)
T4 AB SER-ACNC: 6.3 UG/DL — SIGNIFICANT CHANGE UP (ref 4.6–12)
TACROLIMUS SERPL-MCNC: 17.5 NG/ML — SIGNIFICANT CHANGE UP
TSH SERPL-MCNC: 0.86 UIU/ML — SIGNIFICANT CHANGE UP (ref 0.27–4.2)
WBC # BLD: 4.6 K/UL — SIGNIFICANT CHANGE UP (ref 3.8–10.5)
WBC # FLD AUTO: 4.6 K/UL — SIGNIFICANT CHANGE UP (ref 3.8–10.5)

## 2018-05-24 PROCEDURE — 99222 1ST HOSP IP/OBS MODERATE 55: CPT | Mod: GC

## 2018-05-24 PROCEDURE — 99233 SBSQ HOSP IP/OBS HIGH 50: CPT

## 2018-05-24 PROCEDURE — 90832 PSYTX W PT 30 MINUTES: CPT

## 2018-05-24 PROCEDURE — 93306 TTE W/DOPPLER COMPLETE: CPT | Mod: 26

## 2018-05-24 RX ADMIN — FAMOTIDINE 20 MILLIGRAM(S): 10 INJECTION INTRAVENOUS at 17:32

## 2018-05-24 RX ADMIN — Medication 2 TABLET(S): at 07:25

## 2018-05-24 RX ADMIN — Medication 6 UNIT(S): at 08:13

## 2018-05-24 RX ADMIN — Medication 7.5 MILLIGRAM(S): at 17:32

## 2018-05-24 RX ADMIN — TACROLIMUS 6 MILLIGRAM(S): 5 CAPSULE ORAL at 08:06

## 2018-05-24 RX ADMIN — ENOXAPARIN SODIUM 80 MILLIGRAM(S): 100 INJECTION SUBCUTANEOUS at 07:22

## 2018-05-24 RX ADMIN — ATOVAQUONE 1500 MILLIGRAM(S): 750 SUSPENSION ORAL at 12:26

## 2018-05-24 RX ADMIN — INSULIN GLARGINE 12 UNIT(S): 100 INJECTION, SOLUTION SUBCUTANEOUS at 23:06

## 2018-05-24 RX ADMIN — SODIUM CHLORIDE 3 MILLILITER(S): 9 INJECTION INTRAMUSCULAR; INTRAVENOUS; SUBCUTANEOUS at 13:10

## 2018-05-24 RX ADMIN — Medication 6 UNIT(S): at 17:32

## 2018-05-24 RX ADMIN — Medication 7.5 MILLIGRAM(S): at 07:24

## 2018-05-24 RX ADMIN — AMLODIPINE BESYLATE 5 MILLIGRAM(S): 2.5 TABLET ORAL at 07:22

## 2018-05-24 RX ADMIN — Medication 1 APPLICATION(S): at 07:23

## 2018-05-24 RX ADMIN — Medication 2 TABLET(S): at 17:32

## 2018-05-24 RX ADMIN — ENOXAPARIN SODIUM 80 MILLIGRAM(S): 100 INJECTION SUBCUTANEOUS at 17:32

## 2018-05-24 RX ADMIN — MYCOPHENOLATE MOFETIL 1000 MILLIGRAM(S): 250 CAPSULE ORAL at 08:04

## 2018-05-24 RX ADMIN — Medication 500000 UNIT(S): at 17:32

## 2018-05-24 RX ADMIN — Medication 500000 UNIT(S): at 23:07

## 2018-05-24 RX ADMIN — VALGANCICLOVIR 450 MILLIGRAM(S): 450 TABLET, FILM COATED ORAL at 08:04

## 2018-05-24 RX ADMIN — TACROLIMUS 6 MILLIGRAM(S): 5 CAPSULE ORAL at 20:34

## 2018-05-24 RX ADMIN — Medication 1300 MILLIGRAM(S): at 23:07

## 2018-05-24 RX ADMIN — Medication 500000 UNIT(S): at 12:26

## 2018-05-24 RX ADMIN — Medication 81 MILLIGRAM(S): at 12:25

## 2018-05-24 RX ADMIN — Medication 240 MILLIGRAM(S): at 08:04

## 2018-05-24 RX ADMIN — MAGNESIUM OXIDE 400 MG ORAL TABLET 400 MILLIGRAM(S): 241.3 TABLET ORAL at 07:22

## 2018-05-24 RX ADMIN — ATORVASTATIN CALCIUM 10 MILLIGRAM(S): 80 TABLET, FILM COATED ORAL at 23:06

## 2018-05-24 RX ADMIN — SODIUM CHLORIDE 3 MILLILITER(S): 9 INJECTION INTRAMUSCULAR; INTRAVENOUS; SUBCUTANEOUS at 23:07

## 2018-05-24 RX ADMIN — Medication 1300 MILLIGRAM(S): at 13:10

## 2018-05-24 RX ADMIN — SODIUM CHLORIDE 3 MILLILITER(S): 9 INJECTION INTRAMUSCULAR; INTRAVENOUS; SUBCUTANEOUS at 06:15

## 2018-05-24 RX ADMIN — FAMOTIDINE 20 MILLIGRAM(S): 10 INJECTION INTRAVENOUS at 07:22

## 2018-05-24 RX ADMIN — Medication 1300 MILLIGRAM(S): at 07:22

## 2018-05-24 RX ADMIN — Medication 25 MILLIGRAM(S): at 07:25

## 2018-05-24 RX ADMIN — Medication 1 TABLET(S): at 12:26

## 2018-05-24 RX ADMIN — MAGNESIUM OXIDE 400 MG ORAL TABLET 400 MILLIGRAM(S): 241.3 TABLET ORAL at 17:32

## 2018-05-24 RX ADMIN — VALGANCICLOVIR 450 MILLIGRAM(S): 450 TABLET, FILM COATED ORAL at 20:34

## 2018-05-24 RX ADMIN — Medication 6 UNIT(S): at 12:25

## 2018-05-24 RX ADMIN — MYCOPHENOLATE MOFETIL 1000 MILLIGRAM(S): 250 CAPSULE ORAL at 20:34

## 2018-05-24 RX ADMIN — Medication 1 APPLICATION(S): at 17:33

## 2018-05-24 RX ADMIN — Medication 500000 UNIT(S): at 07:22

## 2018-05-24 NOTE — DIETITIAN INITIAL EVALUATION ADULT. - ENERGY NEEDS
Ht:5ft8in, Wt:146.6pounds, BMI:22.4,  IBW:154pounds (+/-10%), 95%IBW  Pertinent Information: Pt admitted with hyperglycemia, BC >600 possiblye steroid induced; resolving polyuria, polydipsia, and blurred vision as per chart. No pressure ulcers or edema noted at this time.

## 2018-05-24 NOTE — PROGRESS NOTE ADULT - ASSESSMENT
67M with ACC/AHA stage D chronic systolic heart failure due to NICM s/p HM2 LVAD 6/17 c/b pump thrombosis now s/p heart transplant on 2/23 (CMV D-/R+ and Toxo D-/R+), with post-op course c/b primary graft dysfunction, initially thought to be immune-mediated due to positive B-cell flow (negative CDC cross match) and received plasmapheresis/IVIg x2 with improvement in LV function.   Post-transplant course has been complicated by bilateral IJ/subclavian thrombi and he has a persistent small  right sided pleural effusion as well as chronic renal failure, thought to be related to Prograf.    Echo on 4/3 showed evidence of slight worsened LV strain, LVEF of 52%, and slightly diminished right ventricular systolic function.    RHC on 4/4 showed elevated biventricular filling pressures with normal cardiac output.   Biopsy showed 1R/1A cellular rejection. C4D continues to be diffusely positive. DSA is unremarkable.  He continued to make progress and was discharged to home on 4/10.    RHC on 4/19 for RHC with biopsy revealing no rejection  and presented on 5/3 for infusion of Rituxan second dose for suspected Graft rejection.    Last RHC 5/9 for biopsy and echo.  Pt presents to clinic for routine lab check up and c/o generalized malaise with frequent urination and thirst.  Glucose on BMP noted to be >700 and pt admitted for further workup r/o HHNK.  5/24 The patient was placed on an insulin infusion yesterday, endocrine consulted.  HF following, prograf 4mg last carie , 6mg BID, level 17.5 today.  The dosage is pending  Endo f/u to d/c insulin infusion to determine dosage of lantus/humalog

## 2018-05-24 NOTE — PROGRESS NOTE ADULT - SUBJECTIVE AND OBJECTIVE BOX
Behavioral Cardiology Progress Note     HPI:  Mr. Baez is a 68 year-old man, , only son . Owns house he lives in with his brother.  Stage D chronic systolic heart failure due to NICM, s/p LVAD  c/b pump thrombus now s/p OHT  (CMV D-/R+ and Toxo D-/R+), with post- op course c/b primary graft dysfunction.  Now presenting with hyperglycemia likely 2/2 to steroids vs. prograf.     Current stressors:   Hospitalization   s/p Heart transplant (18)      Support system/family support: Good support from family and close friend      Coping strategies: Talking with family and staff, watching TV, his pastora, talking to his granddaughter     MSE:  Seen sitting in chair. A&Ox3.  Well-related with good eye contact. Speech normal rate and volume. Thought process goal directed. No evidence of any psychosis, delusions, jona. Mood neutral. Affect full range. Insight and judgment adequate.

## 2018-05-24 NOTE — CONSULT NOTE ADULT - SUBJECTIVE AND OBJECTIVE BOX
HPI:  68M s/p cardiac transplant 18 for NICM. Due to concern for graft rejection he was admitted -18 for plasmapheresis and IVIG which improved his LV function. He was admitted 18 for hyperglycemia >700mg/dL with associated malaise, polyuria and polydipsia.   On Tacrolimus, Cellcept and Prednisone 12.5mg BID       PAST MEDICAL & SURGICAL HISTORY:  DVT of upper extremity (deep vein thrombosis)  Hepatitis C virus  GIB (gastrointestinal bleeding)  Ventricular fibrillation: s/p AICD  PAF (paroxysmal atrial fibrillation): on xarelto  Non-Ischemic Cardiomyopathy  SVT (Supraventricular Tachycardia)  HTN  CHF (Congestive Heart Failure)  H/O heart transplant: 2018  LVAD (left ventricular assist device) present  Status post left hip replacement  History of Prior Ablation Treatment: for afib  AICD (Automatic Cardioverter/Defibrillator) Present: [employer] with 1 [employer] lead09- explanted and replaced with Medtronic 2 leads on 09      Allergies    No Known Allergies    Intolerances        ANTIMICROBIALS:  atovaquone Suspension 1500 daily  nystatin    Suspension [id number] every 6 hours  valGANciclovir 450 two times a day      OTHER MEDS:  amLODIPine   Tablet 5 milliGRAM(s) Oral daily  aspirin enteric coated 81 milliGRAM(s) Oral daily  atorvastatin 10 milliGRAM(s) Oral at bedtime  calcium carbonate 1250 mG + Vitamin D (OsCal 500 + D) 2 Tablet(s) Oral two times a day  diltiazem    milliGRAM(s) Oral daily  enoxaparin Injectable 80 milliGRAM(s) SubCutaneous two times a day  famotidine    Tablet 20 milliGRAM(s) Oral two times a day  hydrochlorothiazide 25 milliGRAM(s) Oral daily  insulin glargine Injectable (LANTUS) 12 Unit(s) SubCutaneous at bedtime  insulin Infusion 4 Unit(s)/Hr IV Continuous <Continuous>  insulin lispro (HumaLOG) corrective regimen sliding scale   SubCutaneous three times a day before meals  insulin lispro (HumaLOG) corrective regimen sliding scale   SubCutaneous at bedtime  insulin lispro Injectable (HumaLOG) 6 Unit(s) SubCutaneous three times a day before meals  magnesium oxide 400 milliGRAM(s) Oral every 12 hours  mavyret 100mg/40mg  tablet 3 Tablet(s) 3 Tablet(s) Oral daily  multivitamin 1 Tablet(s) Oral daily  mycophenolate mofetil 1000 milliGRAM(s) Oral two times a day  predniSONE   Tablet 7.5 milliGRAM(s) Oral two times a day  silver sulfADIAZINE 1% Cream 1 Application(s) Topical two times a day  sodium bicarbonate 1300 milliGRAM(s) Oral every 8 hours  sodium chloride 0.9% lock flush 3 milliLiter(s) IV Push every 8 hours  sodium chloride 0.9%. 1000 milliLiter(s) IV Continuous <Continuous>  tacrolimus 6 milliGRAM(s) Oral two times a day      SOCIAL HISTORY:    Marital Status:  (   )    (  ) Single    (   )    (  )   Occupation:   Lives with: (  ) alone  (  ) children   (  ) spouse   (  ) parents  (  ) other    Substance Use (street drugs): (  ) never used  (  ) other:  Tobacco Usage:  (  ) never smoked   (   ) former smoker   (   ) current smoker  (     ) pack year  (        ) last cigarette date  Alcohol Usage: Social EtOH    FAMILY HISTORY:  No pertinent family history in first degree relatives      ROS:  Unobtainable because:   All other systems negative     Constitutional: no fever, no chills, no weight loss, no night sweats  HEENT:  no vision changes, no sore throat, no rhinorrhea  Cardiovascular:  no chest pain, no palpitation  Respiratory:  no SOB, no cough  GI:  no abd pain, no vomiting, no diarrhea  urinary: no dysuria, no hematuria, no flank pain  musculoskeletal:  no joint pain, no joint swelling  skin:  no rash  neurology:  no headache, no seizure, no change in mental status    Physical Exam:    General:    NAD, non toxic, A&O x3  HEENT:   no oropharyngeal lesions, no LAD, neck supple  Cardiovascular:    regular S1,S2, no murmur  Respiratory:   clear b/l, no wheezing  abd:   soft, BS +, not tender, no hepatosplenomegaly  :     no CVAT, no spurapubic tenderness, no diaz  Musculoskeletal : no joint swelling, no edema  Skin:    no rash  Neurologic:     no focal deficits      Drug Dosing Weight  Height (cm): 172.72 (23 May 2018 18:20)  Weight (kg): 66.5 (23 May 2018 18:20)  BMI (kg/m2): 22.3 (23 May 2018 18:20)  BSA (m2): 1.79 (23 May 2018 18:20)    Vital Signs Last 24 Hrs  T(F): 97.9 (18 @ 11:33), Max: 98.7 (18 @ 17:11)    Vital Signs Last 24 Hrs  HR: 96 (18 @ 11:33) (85 - 105)  BP: 104/73 (18 @ 11:33) (104/73 - 121/95)  RR: 18 (18 @ 11:33)  SpO2: 96% (18 @ 11:33) (93% - 100%)  Wt(kg): --                          12.2   4.6   )-----------( 146      ( 24 May 2018 07:22 )             39.7           132<L>  |  97  |  37<H>  ----------------------------<  135<H>  4.6   |  19<L>  |  1.29    Ca    9.4      24 May 2018 07:22    TPro  6.3  /  Alb  3.6  /  TBili  0.8  /  DBili  x   /  AST  42<H>  /  ALT  21  /  AlkPhos  65  05-24      Urinalysis Basic - ( 23 May 2018 20:06 )    Color: Yellow / Appearance: Clear / S.025 / pH: x  Gluc: x / Ketone: Negative  / Bili: Negative / Urobili: Negative mg/dL   Blood: x / Protein: Negative mg/dL / Nitrite: Negative   Leuk Esterase: Negative / RBC: x / WBC x   Sq Epi: x / Non Sq Epi: x / Bacteria: x        MICROBIOLOGY:  v  .Sputum Sputum  18   Normal Respiratory Heaven present  --    Few Squamous epithelial cells per low power field  Few polymorphonuclear leukocytes per low power field  Few Gram Variable Rods per oil power field  Rare Gram variable coccobacilli per oil power field        CMV IgG Antibody: >10.00 U/mL (18 @ 23:45)  HIV-1 RNA Quantitative, Viral Load Log: NOT DET. lg /mL (18 @ 19:25)            RADIOLOGY: HPI:  68M s/p cardiac transplant 18 for NICM, discharged 4/10/18, on Tacrolimus, Cellcept and Prednisone 12.5mg BID. Recently admitted -18 for plasmapheresis and IVIG due to concern for graft rejection. Now admitted 18 for hyperglycemia >700mg/dL with associated malaise, polyuria and polydipsia at outpatient follow up. He had these symptoms for about three days prior to presentation. Denies previous history of diabetes. Glucose is much better now. He feels well. Never had fevers or chills or preceding illness. Denies sore throat, cough, rhinorrhea. No abdominal pain, diarrhea or vomiting. No dysuria. His surgical sites have healed well. No skin rash or joint pain.     PAST MEDICAL & SURGICAL HISTORY:  DVT of upper extremity (deep vein thrombosis)  Hepatitis C virus  GIB (gastrointestinal bleeding)  Ventricular fibrillation: s/p AICD  PAF (paroxysmal atrial fibrillation): on xarelto  Non-Ischemic Cardiomyopathy  SVT (Supraventricular Tachycardia)  HTN  CHF (Congestive Heart Failure)  H/O heart transplant: 2018  LVAD (left ventricular assist device) present  Status post left hip replacement  History of Prior Ablation Treatment: for afib  AICD (Automatic Cardioverter/Defibrillator) Present: [employer] with 1 [employer] lead09- explanted and replaced with Medtronic 2 leads on 09      Allergies    No Known Allergies    Intolerances    ANTIMICROBIALS:  atovaquone Suspension 1500 daily  nystatin    Suspension [id number] every 6 hours  valGANciclovir 450 two times a day    OTHER MEDS:  amLODIPine   Tablet 5 milliGRAM(s) Oral daily  aspirin enteric coated 81 milliGRAM(s) Oral daily  atorvastatin 10 milliGRAM(s) Oral at bedtime  calcium carbonate 1250 mG + Vitamin D (OsCal 500 + D) 2 Tablet(s) Oral two times a day  diltiazem    milliGRAM(s) Oral daily  enoxaparin Injectable 80 milliGRAM(s) SubCutaneous two times a day  famotidine    Tablet 20 milliGRAM(s) Oral two times a day  hydrochlorothiazide 25 milliGRAM(s) Oral daily  insulin glargine Injectable (LANTUS) 12 Unit(s) SubCutaneous at bedtime  insulin Infusion 4 Unit(s)/Hr IV Continuous <Continuous>  insulin lispro (HumaLOG) corrective regimen sliding scale   SubCutaneous three times a day before meals  insulin lispro (HumaLOG) corrective regimen sliding scale   SubCutaneous at bedtime  insulin lispro Injectable (HumaLOG) 6 Unit(s) SubCutaneous three times a day before meals  magnesium oxide 400 milliGRAM(s) Oral every 12 hours  mavyret 100mg/40mg  tablet 3 Tablet(s) 3 Tablet(s) Oral daily  multivitamin 1 Tablet(s) Oral daily  mycophenolate mofetil 1000 milliGRAM(s) Oral two times a day  predniSONE   Tablet 7.5 milliGRAM(s) Oral two times a day  silver sulfADIAZINE 1% Cream 1 Application(s) Topical two times a day  sodium bicarbonate 1300 milliGRAM(s) Oral every 8 hours  sodium chloride 0.9% lock flush 3 milliLiter(s) IV Push every 8 hours  sodium chloride 0.9%. 1000 milliLiter(s) IV Continuous <Continuous>  tacrolimus 6 milliGRAM(s) Oral two times a day    SOCIAL HISTORY: Quit smoking. Lives with his brother.     FAMILY HISTORY:  No pertinent family history in first degree relatives    ROS:  All other systems negative   Constitutional: no fever, no chills  HEENT:  no vision changes, no sore throat, no rhinorrhea  Cardiovascular:  no chest pain, no palpitation  Respiratory:  no SOB, no cough  GI:  no abd pain, no vomiting, no diarrhea  urinary: no dysuria, no hematuria, no flank pain  musculoskeletal:  no joint pain, no joint swelling  skin:  no rash  neurology:  no headache  heme: bleeding a little from lovenox injection site, otherwise none     Physical Exam:  General:    NAD, non toxic, A&O x3  HEENT:   no oropharyngeal lesions, top dentures, bottom teeth are his own, missing some but fair dentition. no LAD, neck supple  Cardiovascular: tachycardic, regular rhythm, systolic murmur. sternotomy scar well healed   Respiratory:   nonlabored on room air, clear b/l, no wheezing  abd:  soft, BS +, not tender, no hepatosplenomegaly. multiple healed scars.   : no spurapubic tenderness, no diaz  Musculoskeletal: no joint swelling, no edema  Skin: no rash. small skin break left abdomen from lovenox injection   Neurologic:     no focal deficits  Psych: cooperative     Drug Dosing Weight  Height (cm): 172.72 (23 May 2018 18:20)  Weight (kg): 66.5 (23 May 2018 18:20)  BMI (kg/m2): 22.3 (23 May 2018 18:20)  BSA (m2): 1.79 (23 May 2018 18:20)    Vital Signs Last 24 Hrs  T(F): 97.9 (18 @ 11:33), Max: 98.7 (18 @ 17:11)    Vital Signs Last 24 Hrs  HR: 96 (18 @ 11:33) (85 - 105)  BP: 104/73 (18 @ 11:33) (104/73 - 121/95)  RR: 18 (18 @ 11:33)  SpO2: 96% (18 @ 11:33) (93% - 100%)  Wt(kg): --                          12.2   4.6   )-----------( 146      ( 24 May 2018 07:22 )             39.7           132<L>  |  97  |  37<H>  ----------------------------<  135<H>  4.6   |  19<L>  |  1.29    Ca    9.4      24 May 2018 07:22    TPro  6.3  /  Alb  3.6  /  TBili  0.8  /  DBili  x   /  AST  42<H>  /  ALT  21  /  AlkPhos  65  05-24      Urinalysis Basic - ( 23 May 2018 20:06 )    Color: Yellow / Appearance: Clear / S.025 / pH: x  Gluc: x / Ketone: Negative  / Bili: Negative / Urobili: Negative mg/dL   Blood: x / Protein: Negative mg/dL / Nitrite: Negative   Leuk Esterase: Negative / RBC: x / WBC x   Sq Epi: x / Non Sq Epi: x / Bacteria: x        MICROBIOLOGY:  Culture - Sputum . (18 @ 00:37)    Gram Stain:   Few Squamous epithelial cells per low power field  Few polymorphonuclear leukocytes per low power field  Few Gram Variable Rods per oil power field  Rare Gram variable coccobacilli per oil power field    Specimen Source: .Sputum Sputum    Culture Results:   Normal Respiratory Heaven present    CMV IgG Antibody: >10.00 U/mL (18 @ 23:45)    RADIOLOGY:  Xray Chest 1 View- PORTABLE-Urgent (18 @ 20:32)   Right lower lung patchy opacities, some of which partially obscure the   right heart are likely due to atelectasis.

## 2018-05-24 NOTE — DIETITIAN INITIAL EVALUATION ADULT. - NS AS NUTRI INTERV MEALS SNACK3
Recommend change diet to consistent CHO, low fiber, no concentrated potassium. Provide food preferences within therapeutic diet when requested. Encourage use of alternate menu options with in therapeutic diet guidelines as needed. Provide assistance with meals as needed. D/w NP.

## 2018-05-24 NOTE — CONSULT NOTE ADULT - ASSESSMENT
68M s/p cardiac transplant 2/23/18 for NICM, discharged 4/10/18, on Tacrolimus, Cellcept and Prednisone 12.5mg BID. Admitted 5/23/18 for hyperglycemia >700mg/dL with associated malaise, polyuria and polydipsia. Steroid-induced hyperglycemia, now improved.   Immune compromised and known active HCV and CMV positive donor, on Valgancyclovir PCR negative 4/19. No signs of acute infection. Nontoxic, no fevers, asymptomatic.     Recommend  -no additional antibiotics 68M s/p cardiac transplant 2/23/18 for NICM, discharged 4/10/18, on Tacrolimus, Cellcept and Prednisone 12.5mg BID. Admitted 5/23/18 for hyperglycemia >700mg/dL with associated malaise, polyuria and polydipsia. Steroid-induced hyperglycemia, now improved.   Immune compromised. Acquired HCV from donor, on treatment with Mavyret. CMV negative 4/19, on Valgancyclovir. No signs of acute infection. Nontoxic, no fevers, asymptomatic.     Recommend  -no additional antibiotics   -continue Mepron prophylaxis and Valganciclovir 68M s/p cardiac transplant 2/23/18 for NICM, discharged 4/10/18, on Tacrolimus, Cellcept and Prednisone 12.5mg BID. Admitted 5/23/18 for hyperglycemia >700mg/dL with associated malaise, polyuria and polydipsia. Steroid-induced hyperglycemia, now improved.   Immune compromised. Acquired HCV from donor, on treatment with Mavyret. CMV viral load negative 4/19, on Valgancyclovir. No signs of acute infection. Nontoxic, no fevers, asymptomatic.     Recommend  -no additional antibiotics   -continue Mepron prophylaxis and Valganciclovir   - please repeat CMV titer  - please check HCV viral load

## 2018-05-24 NOTE — PROGRESS NOTE ADULT - SUBJECTIVE AND OBJECTIVE BOX
Interval History:  No acute events overnight noted.       Medications:  amLODIPine   Tablet 5 milliGRAM(s) Oral daily  aspirin enteric coated 81 milliGRAM(s) Oral daily  atorvastatin 10 milliGRAM(s) Oral at bedtime  atovaquone Suspension 1500 milliGRAM(s) Oral daily  calcium carbonate 1250 mG + Vitamin D (OsCal 500 + D) 2 Tablet(s) Oral two times a day  diltiazem    milliGRAM(s) Oral daily  enoxaparin Injectable 80 milliGRAM(s) SubCutaneous two times a day  famotidine    Tablet 20 milliGRAM(s) Oral two times a day  hydrochlorothiazide 25 milliGRAM(s) Oral daily  insulin glargine Injectable (LANTUS) 12 Unit(s) SubCutaneous at bedtime  insulin Infusion 4 Unit(s)/Hr IV Continuous <Continuous>  insulin lispro (HumaLOG) corrective regimen sliding scale   SubCutaneous three times a day before meals  insulin lispro (HumaLOG) corrective regimen sliding scale   SubCutaneous at bedtime  insulin lispro Injectable (HumaLOG) 6 Unit(s) SubCutaneous three times a day before meals  magnesium oxide 400 milliGRAM(s) Oral every 12 hours  multivitamin 1 Tablet(s) Oral daily  mycophenolate mofetil 1000 milliGRAM(s) Oral two times a day  nystatin    Suspension [id number] Unit(s) Oral every 6 hours  predniSONE   Tablet 7.5 milliGRAM(s) Oral two times a day  silver sulfADIAZINE 1% Cream 1 Application(s) Topical two times a day  sodium bicarbonate 1300 milliGRAM(s) Oral every 8 hours  sodium chloride 0.9% lock flush 3 milliLiter(s) IV Push every 8 hours  sodium chloride 0.9%. 1000 milliLiter(s) IV Continuous <Continuous>  tacrolimus 6 milliGRAM(s) Oral two times a day  valGANciclovir 450 milliGRAM(s) Oral two times a day    Vitals:  T(C): 36.4 (05-24-18 @ 03:00), Max: 37.1 (05-23-18 @ 17:11)  HR: 85 (05-24-18 @ 03:00) (85 - 105)  BP: 107/73 (05-24-18 @ 03:00) (107/73 - 121/95)  BP(mean): --  ABP: --  ABP(mean): --  RR: 16 (05-24-18 @ 03:00) (16 - 20)  SpO2: 93% (05-24-18 @ 03:00) (93% - 100%)      Daily Height in cm: 172.72 (23 May 2018 18:20)    Daily     Weight (kg): 66.5 (05-23 @ 18:20)    I&O's Summary    23 May 2018 07:01  -  24 May 2018 07:00  --------------------------------------------------------  IN: 1949 mL / OUT: 400 mL / NET: 1549 mL    24 May 2018 07:01  -  24 May 2018 09:19  --------------------------------------------------------  IN: 227 mL / OUT: 0 mL / NET: 227 mL        Physical Exam:  Appearance: No Acute Distress  HEENT: No JVD  Cardiovascular: Normal S1 S2, No murmurs/rubs/gallops  Respiratory: Clear to auscultation bilaterally  Gastrointestinal: Soft, Non-tender	  Skin: No cyanosis	  Neurologic: Non-focal  Extremities: No LE edema  Psychiatry: A & O x 3, Mood & affect appropriate        Labs:                        12.2   4.6   )-----------( 146      ( 24 May 2018 07:22 )             39.7     05-24    132<L>  |  97  |  37<H>  ----------------------------<  135<H>  4.6   |  19<L>  |  1.29    Ca    9.4      24 May 2018 07:22    TPro  6.3  /  Alb  3.6  /  TBili  0.8  /  DBili  x   /  AST  42<H>  /  ALT  21  /  AlkPhos  65  05-24    PT/INR - ( 23 May 2018 16:39 )   PT: 10.8 sec;   INR: 1.00 ratio         PTT - ( 23 May 2018 16:39 )  PTT:32.2 sec    TELEMETRY:    [ ] Echocardiogram:

## 2018-05-24 NOTE — PROGRESS NOTE ADULT - PROBLEM SELECTOR PLAN 1
-Test BG ac and hs  -Continue Lantus 12 units q hs and Humalog 6 with meals for now.  -Discontinue insulin drip. Pt doesn't need it any more.  -Once off insulin drip will be able to assess further insulin needs.  -Spoke to staff about the need to practice with pt insulin injection/preparation with pen and finger sticks> document teach back!  -Will need home care to reinforce education. Brother might be able to assist pt at home.  -Has f/u apt at endo clinic on 6/21/18 at 10:40am . [address] [facility].  -Plan discussed with pt/team/staff.  Contact info: [phone] (24/7). pager [phone] -Test BG ac and hs  -Continue Lantus 12 units q hs and Humalog 6 with meals for now.  -Discontinue insulin drip. Pt doesn't need it any more.  -Once off insulin drip will be able to assess further insulin needs.  -Spoke to staff about the need to practice with pt insulin injection/preparation with pen and finger sticks> document teach back!  -Needs Rx for:  Solo Star Insulin pen/Humalog Kwik insulin pen/[place] insulin pen/glucose meter/strips/lancets  -Will need home care to reinforce education. Brother might be able to assist pt at home.  -Has f/u apt at endo clinic on 6/21/18 at 10:40am . [address] [facility].  -Plan discussed with pt/team/staff.  Contact info: [phone] (24/7). pager [phone]

## 2018-05-24 NOTE — PROGRESS NOTE ADULT - SUBJECTIVE AND OBJECTIVE BOX
DIABETES FOLLOW UP NOTE: Saw pt earlier today  INTERVAL HX: 67 y/o M w/h/o HTN/CHF/STV/Tony Fib>AICD/PAF/NICM>LVAD and more recently heart tx c/b DVT on Lovenox. Here with S&Sx of hyperglycemia and BG >900s likely due to steroid therapy and prograf. Pt now requiring insulin. Reports tolerating POs and resolving polyuria/polydipsia and blurred vision. Still feeling tired and weak.  Glycemic control at goal while on sq insulin. However, insulin drip was restarted this am for BG >120s and pt is presently getting 3 units/hr for . No hypoglycemia.    Review of Systems:  "I'm ok"  Cardiovascular: No chest pain, palpitations  Respiratory: No SOB, no cough  GI: No nausea, vomiting, abdominal pain  Endocrine: resloving polyuria, polydipsia and no S&Sx of hypoglycemia    Allergies    No Known Allergies    Intolerances      MEDICATIONS:  atorvastatin 10 milliGRAM(s) Oral at bedtime  hydrochlorothiazide 25 milliGRAM(s) Oral daily  insulin glargine Injectable (LANTUS) 12 Unit(s) SubCutaneous at bedtime  insulin Infusion 4 Unit(s)/Hr (4 mL/Hr) IV Continuous <Continuous>  insulin lispro (HumaLOG) corrective regimen sliding scale   SubCutaneous three times a day before meals  insulin lispro (HumaLOG) corrective regimen sliding scale   SubCutaneous at bedtime  insulin lispro Injectable (HumaLOG) 6 Unit(s) SubCutaneous three times a day before meals  predniSONE   Tablet 7.5 milliGRAM(s) Oral two times a day  tacrolimus 6 milliGRAM(s) Oral two times a day  valGANciclovir 450 milliGRAM(s) Oral two times a day      PHYSICAL EXAM:  VITALS: T(C): 36.6 (05-24-18 @ 11:33)  T(F): 97.9 (05-24-18 @ 11:33), Max: 98.7 (05-23-18 @ 17:11)  HR: 96 (05-24-18 @ 11:33) (85 - 105)  BP: 104/73 (05-24-18 @ 11:33) (104/73 - 121/95)  RR:  (16 - 20)  SpO2:  (93% - 100%)  Wt(kg): --  GENERAL: Male laying in bed in NAD  Chest: Old scars from prior cardiac surgeries healed  Abdomen: Soft, nontender, non distended  Extremities: Warm, no edema noted in all 4 exts  NEURO: A&Ox3. Somewhat forgetful. Can't recall heart tx meds, states someone place thm in a box. Also brother gives  him a shot of "something" in his "belly".      LABS:  POCT Blood Glucose.: 102 mg/dL (05-24-18 @ 11:52)  POCT Blood Glucose.: 105 mg/dL (05-24-18 @ 11:05)  POCT Blood Glucose.: 149 mg/dL (05-24-18 @ 10:04)  POCT Blood Glucose.: 131 mg/dL (05-24-18 @ 09:07)  POCT Blood Glucose.: 127 mg/dL (05-24-18 @ 08:09)  POCT Blood Glucose.: 134 mg/dL (05-24-18 @ 07:10)  POCT Blood Glucose.: 151 mg/dL (05-24-18 @ 05:55)  POCT Blood Glucose.: 127 mg/dL (05-24-18 @ 05:10)  POCT Blood Glucose.: 113 mg/dL (05-24-18 @ 04:36)  POCT Blood Glucose.: 101 mg/dL (05-24-18 @ 03:41)  POCT Blood Glucose.: 98 mg/dL (05-24-18 @ 03:06)  POCT Blood Glucose.: 95 mg/dL (05-24-18 @ 03:03)  POCT Blood Glucose.: 136 mg/dL (05-24-18 @ 01:47)  POCT Blood Glucose.: 90 mg/dL (05-24-18 @ 01:03)  POCT Blood Glucose.: 116 mg/dL (05-23-18 @ 23:58)  POCT Blood Glucose.: 165 mg/dL (05-23-18 @ 23:09)  POCT Blood Glucose.: 349 mg/dL (05-23-18 @ 21:31)  POCT Blood Glucose.: 475 mg/dL (05-23-18 @ 19:35)  POCT Blood Glucose.: 570 mg/dL (05-23-18 @ 19:33)  POCT Blood Glucose.: >600 mg/dL (05-23-18 @ 18:08)  POCT Blood Glucose.: >600 mg/dL (05-23-18 @ 16:03)                            12.2   4.6   )-----------( 146      ( 24 May 2018 07:22 )             39.7       05-24    132<L>  |  97  |  37<H>  ----------------------------<  135<H>  4.6   |  19<L>  |  1.29    EGFR if : 66  EGFR if non : 57<L>    Ca    9.4      05-24    TPro  6.3  /  Alb  3.6  /  TBili  0.8  /  DBili  x   /  AST  42<H>  /  ALT  21  /  AlkPhos  65  05-24      Thyroid Function Tests:  05-24 @ 05:48 TSH 0.86 FreeT4 -- T3 61 Anti TPO -- Anti Thyroglobulin Ab -- TSI --      Hemoglobin A1C, Whole Blood: 10.4 % <H> [4.0 - 5.6] (05-23-18 @ 20:06)  Hemoglobin A1C, Whole Blood: 5.3 % [4.0 - 5.6] (03-18-18 @ 11:20)

## 2018-05-24 NOTE — PROGRESS NOTE ADULT - ASSESSMENT
Sitting comfortably in chair.  Mood "ok."  Not happy to be readmitted but understands reasons for admission, "my blood sugar was too high."  At home has strong support system (family, close friends) also has VNS and home health aid for additional assistance with health care needs.  His granddaughter and her mother have been visiting him frequently which he enjoys.  Appetite good.  Has meals on wheels deliver food twice weekly.  Sleeping well at home but finds hospital bed uncomfortable.  Receptive to support and validation. Sitting comfortably in chair.  Mood "ok."  Not happy to be readmitted but understands reasons for admission, "my blood sugar was too high."  At home has strong support system (family, close friends) also has VNS and home health aid for additional assistance with health care needs.  His granddaughter and her mother have been visiting him frequently which he enjoys.  Appetite good.  Has meals on wheels deliver food twice weekly.  Sleeping well at home but finds hospital bed uncomfortable.  Receptive to support and validation.    Dx: r/o Adjustment disorder F43

## 2018-05-24 NOTE — PROGRESS NOTE ADULT - PROBLEM SELECTOR PLAN 6
Goal SBP: 110/70  Continue with HCTZ 25 po daily   Will discuss continuation of Norvasc 5 mg po daily due to borderline blood pressure of 100/60.

## 2018-05-24 NOTE — PROGRESS NOTE ADULT - ASSESSMENT
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 Now presenting with hyperglycemia likely 2/2 to steroids vs. prograf.     Please call me at [phone] for any further questions up till 5 pm. For after hours, please call the covering cardiology [place] [phone] for any further assistance.

## 2018-05-24 NOTE — DIETITIAN INITIAL EVALUATION ADULT. - PERTINENT MEDS FT
NS at 75ml/hr, Prograf, [id number], Pepcid, hydrochlorothiazide, Lantus (12 units at bedtime), Insulin Infusion, Humalog sliding scale, Multivitamin, Cellcept, Prednisone, Sodium Bicarbonate.

## 2018-05-24 NOTE — DIETITIAN INITIAL EVALUATION ADULT. - ADHERENCE
Suspected poor compliance to nutrition guidelines for after heart transplant as pt well known to this RD and has requested foods not within therapeutic diet guidelines during previous admissions.

## 2018-05-24 NOTE — DIETITIAN INITIAL EVALUATION ADULT. - NS AS NUTRI INTERV ED CONTENT
Pt refused diet instruction at this time, written materials left at bedside. RD to follow up with diet education as accepted by pt. D/w NP.

## 2018-05-24 NOTE — PROGRESS NOTE ADULT - SUBJECTIVE AND OBJECTIVE BOX
VITAL SIGNS  T(F): 97.9  HR: 100  BP: 105/57  RR: 18  SpO2: 96%    05-23-18 @ 07:01  -  05-24-18 @ 07:00  --------------------------------------------------------  OUT: 400 mL / NET: -400 mL    05-24-18 @ 07:01  -  05-24-18 @ 19:40  --------------------------------------------------------  OUT: 700 mL / NET: -700 mL        LAB  [facility]    CAPILLARY BLOOD GLUCOSE    DIAGNOSTICS    MEDICATIONS  amLODIPine   Tablet 5 milliGRAM(s) Oral daily  atorvastatin 10 milliGRAM(s) Oral at bedtime  atovaquone Suspension 1500 milliGRAM(s) Oral daily  diltiazem    milliGRAM(s) Oral daily  famotidine    Tablet 20 milliGRAM(s) Oral two times a day  hydrochlorothiazide 25 milliGRAM(s) Oral daily  insulin glargine Injectable (LANTUS) 12 Unit(s) SubCutaneous at bedtime  insulin Infusion 4 Unit(s)/Hr IV Continuous <Continuous>  insulin lispro (HumaLOG) corrective regimen sliding scale   SubCutaneous three times a day before meals  insulin lispro (HumaLOG) corrective regimen sliding scale   SubCutaneous at bedtime  insulin lispro Injectable (HumaLOG) 6 Unit(s) SubCutaneous three times a day before meals  nystatin    Suspension 606404 Unit(s) Oral every 6 hours  predniSONE   Tablet 7.5 milliGRAM(s) Oral two times a day  valGANciclovir 450 milliGRAM(s) Oral two times a day      PHYSICAL EXAM  GEN: No apparent distress, examined in   NEURO: Non-focal,  A+Ox 3  CV: S1 S2 without rub or murmur  PULMONARY:  CTA, Decreased left base   ABDOMEN:  Soft, non-tender, non-distended, bowel sounds active x 4  LBM:  : voiding   VASCULAR: +pp +radial  SKIN: Warm, dry, intact   leg incision:  ACE [  ]  MUSCULOSKELETAL:  Moves all extremities equally VITAL SIGNS  T(F): 97.9  HR:  Tele: SR  BP: 105/57  RR: 18  SpO2: 96%    05-24-18 @ 07:01  -  05-24-18 @ 19:40  --------------------------------------------------------  OUT: 700 mL / NET: -700 mL    LAB                        12.2   4.6   )-----------( 146      ( 24 May 2018 07:22 )             39.7   05-24    132<L>  |  97  |  37<H>  ----------------------------<  135<H>  4.6   |  19<L>  |  1.29    Ca    9.4      24 May 2018 07:22    TPro  6.3  /  Alb  3.6  /  TBili  0.8  /  DBili  x   /  AST  42<H>  /  ALT  21  /  AlkPhos  65  05-24    FK level: Tacrolimus: 17.5      CAPILLARY BLOOD GLUCOSE  POCT Blood Glucose.: 150 mg/dL (24 May 2018 22:09)  POCT Blood Glucose.: 105 mg/dL (24 May 2018 17:06)  POCT Blood Glucose.: 88 mg/dL (24 May 2018 13:38)  POCT Blood Glucose.: 84 mg/dL (24 May 2018 13:04)  POCT Blood Glucose.: 102 mg/dL (24 May 2018 11:52)    DIAGNOSTICS  Cardiac Cath Lab - Adult (05.09.18 @ 11:53)    filling pressures improved, but remain elevated with normal  cardiac output. uncomplicated RV biopsy.  Pressures:  -- Pulmonary Artery (S/D/M): 43/20/29  Pressures:  -- Pulmonary Capillary Wedge: 15/16/14  Pressures:  -- Right Atrium (a/v/M): 14/14/12  Pressures:  -- Right Ventricle (s/edp): 40/12/--    Transthoracic Echocardiogram (05.09.18 @ 09:23)   1. Dilated left atrium secondary to cardiac transplant.  LA  volume index = 58 cc/m2.  2. Increased relative wall thickness with normal left  ventricularmass index, consistent with concentric left  ventricular remodeling.  3. Overall borderline  left ventricular systolic function.  Septal motion consistent with prior cardiac surgery.  4. Normal right ventricular size with decreased right  ventricularsystolic function.      MEDICATIONS  asa 81 mg po daily  lovenox 80 mg BID  amLODIPine   Tablet 5 milliGRAM(s) Oral daily  atorvastatin 10 milliGRAM(s) Oral at bedtime  atovaquone Suspension 1500 milliGRAM(s) Oral daily  diltiazem    milliGRAM(s) Oral daily  famotidine    Tablet 20 milliGRAM(s) Oral two times a day  hydrochlorothiazide 25 milliGRAM(s) Oral daily  insulin glargine Injectable (LANTUS) 12 Unit(s) SubCutaneous at bedtime  insulin lispro (HumaLOG) corrective regimen sliding scale   SubCutaneous three times a day before meals  insulin lispro (HumaLOG) corrective regimen sliding scale   SubCutaneous at bedtime  insulin lispro Injectable (HumaLOG) 6 Unit(s) SubCutaneous three times a day before meals  nystatin    Suspension 574579 Unit(s) Oral every 6 hours  predniSONE   Tablet 7.5 milliGRAM(s) Oral two times a day  valGANciclovir 450 milliGRAM(s) Oral two times a day      PHYSICAL EXAM  GEN: No apparent distress, examined OOB to chair  Psychiatry: A & O x 3, Mood & affect appropriate  NEURO: Non-focal,  A+Ox 3  CV: S1 S2 without rub or murmur  PULMONARY:  clear bilaterally  ABDOMEN:  Soft, non-tender, non-distended, bowel sounds active x 4  LBM: 5/23  : voiding   VASCULAR: +pp +radial  SKIN: Warm, dry, intact   MUSCULOSKELETAL:  Moves all extremities equally, ambulating

## 2018-05-24 NOTE — PROGRESS NOTE ADULT - PROBLEM SELECTOR PLAN 1
1) Discontinue insulin gtt  2) Initiate basal bolus dosing with Lantus 12 units q hs and Humalog 6 with meals   3) Goal blood glucose 100-140  4) R/o Infectious cause- observe off additional antibiotics, continue prophylactic dosing.   CMV titer and HCV viral load pending.  U/A negative, Blood cultures pending. CXR without significant change from previous.   5) Glucose monitoring AC and HS.  Patient and family glucometer teaching implemented.    6) Insulin administration instruction to be initiated with brother and patient.   Reviewed use of insulin pen, requires prompting for correct administration.  7) Consistent Carb diet with nutrition follow up for dietary teaching

## 2018-05-24 NOTE — PROGRESS NOTE ADULT - PROBLEM SELECTOR PLAN 1
Endocrine consulted  Transfer pt to stepdown for insulin infusion  Decrease prednisone dose to 7.5mg BID per HF  Check Blood culture x2, urine culture  Start Normal Saline for hyponatremia  Repeat BMP in 6-8 hours

## 2018-05-24 NOTE — PROGRESS NOTE ADULT - ATTENDING COMMENTS
overnight treated with insulin drip, being transitioned to basal/bolus today.   BS now controlled.  meds: Prograf 4 last night now on 6bid.  level this am 17.5   cellcept 1g bid, pred 7.5 bid, mepron 1500, valcyte 450 bid, lovanox 80 bid. dilt 240, HCTZ 25, novasc 5, mavyret,     05-24    132<L>  |  97  |  37<H>  ----------------------------<  135<H>  4.6   |  19<L>  |  1.29    Ca    9.4      24 May 2018 07:22    TPro  6.3  /  Alb  3.6  /  TBili  0.8  /  DBili  x   /  AST  42<H>  /  ALT  21  /  AlkPhos  65  05-24                          12.2   4.6   )-----------( 146      ( 24 May 2018 07:22 )             39.7   no change in prograf today.  transition to s/c insulin.   diabetic teaching   TTE for continuous murmer.   Marvin Campbell overnight treated with insulin drip, being transitioned to basal/bolus today.   BS now controlled.  meds: Prograf 4 last night now on 6bid.  level this am 17.5   cellcept 1g bid, pred 7.5 bid, mepron 1500, valcyte 450 bid, lovanox 80 bid. dilt 240, HCTZ 25, novasc 5, mavyret,   90, 110/-130/  lunbgs clear, abdo soft.   no LE edema.    05-24    132<L>  |  97  |  37<H>  ----------------------------<  135<H>  4.6   |  19<L>  |  1.29    Ca    9.4      24 May 2018 07:22    TPro  6.3  /  Alb  3.6  /  TBili  0.8  /  DBili  x   /  AST  42<H>  /  ALT  21  /  AlkPhos  65  05-24                          12.2   4.6   )-----------( 146      ( 24 May 2018 07:22 )             39.7   no change in prograf today.  transition to s/c insulin.   diabetic teaching   TTE for continuous murmer.   plastics follow up while he is in house.   Marvin Campbell

## 2018-05-24 NOTE — DIETITIAN INITIAL EVALUATION ADULT. - PERTINENT LABORATORY DATA
()Hgb:12.2-low, Hct:39.7, Na:132-low, K:4.6, Cl:97, BUN:37-high, Cr:1.29, Glucose:135-high, Ca:9.4, Total Protein:6.3, Albumin:3.6, Total Bilirubin:0.8, AlkPhos:65, AST:42-high, ALT:21, Point of Care Testing B-151, ()HbA1c:10.4, Point of Care Testing B->600, ()M.3

## 2018-05-24 NOTE — DIETITIAN INITIAL EVALUATION ADULT. - OTHER INFO
Pt refused to speak with RD at this time. Suspect pt with weight loss PTA. Noted weight per initial RD note prior to transplant of 173.9pounds and noted this admission weight of 146.6pounds. Therefore suspect pt with 27.3pound (~16%) wt loss in about 4 months. Suspect weight loss was unintentional postoperatively. No known food allergies or intolerances reported. Supplementation PTA consisted of a multivitamin, calcium with vitamin D, and magnesium oxide per H&P. Per RN pt with a good appetite and eating >75% of meals. Noted per endocrinology note, "Pt likes food rich in fat and cholesterol (pork chops/oxtail/pork ribs/regular milk)." Note endocrinology spoke with pt about CHO portion control. Provided pt with written materials on CHO counting and pt made aware RD remains available as needed.

## 2018-05-24 NOTE — PROGRESS NOTE ADULT - ASSESSMENT
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 Now presenting with hyperglycemia, glucose initally >700,  likely 2/2 to steroids vs. prograf.     Hospital course:  5/24: Weaned off Insulin gtt transitioned to basal bolus insulin.  Glucometer/Insulin teaching initiated  5/25: Overnight: HR with low noted to be 75-80 with sleep

## 2018-05-24 NOTE — PROGRESS NOTE ADULT - PROBLEM SELECTOR PLAN 7
HCV donor: Continue Mavyret, Hepatology follow up as scheduled  PCP/Toxoplasmosis prophylaxis: Continue mepron 1500 po daily   CMV prophylaxis: continue valcyte 450 mg bid  Oral Candidiasis: Nystatin 500,000 U oral q 6 hours  CAD prophylaxis:  ASA STATIN   GI PPX: H2 blocker, pepcid daily  DVT PPX: covered by Lovenox BID

## 2018-05-24 NOTE — PROGRESS NOTE ADULT - ASSESSMENT
67 y/o M w/h/o HTN/CHF/STV/Tony Fib>AICD/PAF/NICM>LVAD and more recently heart tx c/b DVT on Lovenox. Here with S&Sx of hyperglycemia and BG >900s likely due to steroid therapy and prograf. Pt now requiring insulin. Reports tolerating POs and resolving S&Sx of med induced hyperglycemia. Glycemic control at goal while on sq insulin. However, insulin drip was restarted this am for BG >120s and pt is presently getting 3 units/hr for . No hypoglycemia.  Met with patient and reviewed the following:    -A1c LEVEL. now and goal  -Blood glucose goals> 100s to 200  -Glucose monitoring frequency> ac and hs  -Healthy eating and portion control. Pt likes food rich in fat and cholesterol (pork chops/oxtail/pork ribs/regular milk). Spoke about carbs portion control. Also drinking juices and sodas specially when he was polydipsic.  -Insulin(s) action, time of administration and side effects. Basal/bolus therapy  -Importance of follow up care. Needs to f/u at endo clinic  Reviewed use of insulin pen. Pt unable to recall steps on how to use pen but was able to perform task if prompted. Pt needs to practice finger sticks and insulin prep and injection while in hospital but will need home care and supervision at home with insulin therapy and glucose monitoring.  Spent over 45 minutes with pt

## 2018-05-24 NOTE — PROGRESS NOTE ADULT - SUBJECTIVE AND OBJECTIVE BOX
Immediate Brief Procedure Note    Patient: Kerry Aguirre    Pre-op Dx: multiple left ureteral stones    Post-op Dx: same, evidence infection    Procedure: Left complicated stent placement, left RGPG    Surgeon:  Daniel Degroot MD    Assistants: none    Anesthesia Staff: Anesthesiologist: Jonathan Crespo DO  Anesthesia Staff: Caty Bowen    Anesthesia Type: general    Findings: contrast only to distal ureter, unable to pass a 6 Lithuanian open ended but able to pass a 4.8 Lithuanian 24 cm ureteral stent    Estimated Blood Loss: none    Complications: none    Specimens Removed: urine culture    Disposition:   -home with stent and follow up Dr. Marinelli for definitive management    whats happening  VITAL SIGNS    Telemetry:  nsr     Vital Signs Last 24 Hrs  T(C): 36.6 (05-24-18 @ 11:33), Max: 37.1 (05-23-18 @ 17:11)  T(F): 97.9 (05-24-18 @ 11:33), Max: 98.7 (05-23-18 @ 17:11)  HR: 96 (05-24-18 @ 11:33) (85 - 105)  BP: 104/73 (05-24-18 @ 11:33) (104/73 - 121/95)  RR: 18 (05-24-18 @ 11:33) (16 - 20)  SpO2: 96% (05-24-18 @ 11:33) (93% - 100%)                   05-23 @ 07:01  -  05-24 @ 07:00  --------------------------------------------------------  IN: 1949 mL / OUT: 400 mL / NET: 1549 mL    05-24 @ 07:01  -  05-24 @ 12:18  --------------------------------------------------------  IN: 587 mL / OUT: 300 mL / NET: 287 mL          Daily Height in cm: 172.72 (23 May 2018 18:20)    Daily         CAPILLARY BLOOD GLUCOSE  127 (24 May 2018 08:00)  98 (24 May 2018 03:00)  136 (24 May 2018 01:52)  90 (24 May 2018 01:03)  116 (24 May 2018 00:00)  165 (23 May 2018 23:00)  349 (23 May 2018 21:30)  475 (23 May 2018 20:00)      POCT Blood Glucose.: 102 mg/dL (24 May 2018 11:52)  POCT Blood Glucose.: 105 mg/dL (24 May 2018 11:05)  POCT Blood Glucose.: 149 mg/dL (24 May 2018 10:04)  POCT Blood Glucose.: 131 mg/dL (24 May 2018 09:07)  POCT Blood Glucose.: 127 mg/dL (24 May 2018 08:09)  POCT Blood Glucose.: 134 mg/dL (24 May 2018 07:10)  POCT Blood Glucose.: 151 mg/dL (24 May 2018 05:55)  POCT Blood Glucose.: 127 mg/dL (24 May 2018 05:10)  POCT Blood Glucose.: 113 mg/dL (24 May 2018 04:36)  POCT Blood Glucose.: 101 mg/dL (24 May 2018 03:41)  POCT Blood Glucose.: 98 mg/dL (24 May 2018 03:06)  POCT Blood Glucose.: 95 mg/dL (24 May 2018 03:03)  POCT Blood Glucose.: 136 mg/dL (24 May 2018 01:47)  POCT Blood Glucose.: 90 mg/dL (24 May 2018 01:03)  POCT Blood Glucose.: 116 mg/dL (23 May 2018 23:58)  POCT Blood Glucose.: 165 mg/dL (23 May 2018 23:09)  POCT Blood Glucose.: 349 mg/dL (23 May 2018 21:31)  POCT Blood Glucose.: 475 mg/dL (23 May 2018 19:35)  POCT Blood Glucose.: 570 mg/dL (23 May 2018 19:33)  POCT Blood Glucose.: >600 mg/dL (23 May 2018 18:08)  POCT Blood Glucose.: >600 mg/dL (23 May 2018 16:03)                Coumadin    [ ] YES          [x  ]      NO         lovenox for h/o dvt                          PHYSICAL EXAM        Neurology: alert and oriented x 3, nonfocal, no gross deficits  CV : .S1S2 RRR  Sternal Wound :  CDI , Stable  Lungs: cta  Abdomen: soft, nontender, nondistended, positive bowel sounds, last bowel movement pre admission  :         voiding    Extremities:     - edema - calve tenderness          amLODIPine   Tablet 5 milliGRAM(s) Oral daily  aspirin enteric coated 81 milliGRAM(s) Oral daily  atorvastatin 10 milliGRAM(s) Oral at bedtime  atovaquone Suspension 1500 milliGRAM(s) Oral daily  calcium carbonate 1250 mG + Vitamin D (OsCal 500 + D) 2 Tablet(s) Oral two times a day  diltiazem    milliGRAM(s) Oral daily  enoxaparin Injectable 80 milliGRAM(s) SubCutaneous two times a day  famotidine    Tablet 20 milliGRAM(s) Oral two times a day  hydrochlorothiazide 25 milliGRAM(s) Oral daily  insulin glargine Injectable (LANTUS) 12 Unit(s) SubCutaneous at bedtime  insulin Infusion 4 Unit(s)/Hr IV Continuous <Continuous>  insulin lispro (HumaLOG) corrective regimen sliding scale   SubCutaneous three times a day before meals  insulin lispro (HumaLOG) corrective regimen sliding scale   SubCutaneous at bedtime  insulin lispro Injectable (HumaLOG) 6 Unit(s) SubCutaneous three times a day before meals  magnesium oxide 400 milliGRAM(s) Oral every 12 hours  mavyret 100mg/40mg  tablet 3 Tablet(s) 3 Tablet(s) Oral daily  multivitamin 1 Tablet(s) Oral daily  mycophenolate mofetil 1000 milliGRAM(s) Oral two times a day  nystatin    Suspension 450078 Unit(s) Oral every 6 hours  predniSONE   Tablet 7.5 milliGRAM(s) Oral two times a day  silver sulfADIAZINE 1% Cream 1 Application(s) Topical two times a day  sodium bicarbonate 1300 milliGRAM(s) Oral every 8 hours  sodium chloride 0.9% lock flush 3 milliLiter(s) IV Push every 8 hours  sodium chloride 0.9%. 1000 milliLiter(s) IV Continuous <Continuous>  tacrolimus 6 milliGRAM(s) Oral two times a day  valGANciclovir 450 milliGRAM(s) Oral two times a day                    Physical Therapy Rec:   Home  [  ]   Home w/ PT  [  ]  Rehab  [  ]  Discussed with Cardiothoracic Team at AM rounds.

## 2018-05-24 NOTE — CONSULT NOTE ADULT - ATTENDING COMMENTS
Patient seen and examined with Dr. Fonseca  I agree with his history , exam findings and plans as noted above    Admitted with steroid induced hyperglycemia, r/o underlying infection as a cause  Would send blood cultures, urine culture, CXR similar in appearance to discharge film    continue prophylaxis but can hold off on starting antibiotics    Gary Lujan MD  [phone]  After 5pm/weekends [phone]

## 2018-05-25 DIAGNOSIS — Z29.9 ENCOUNTER FOR PROPHYLACTIC MEASURES, UNSPECIFIED: ICD-10-CM

## 2018-05-25 DIAGNOSIS — R00.1 BRADYCARDIA, UNSPECIFIED: ICD-10-CM

## 2018-05-25 DIAGNOSIS — Z02.9 ENCOUNTER FOR ADMINISTRATIVE EXAMINATIONS, UNSPECIFIED: ICD-10-CM

## 2018-05-25 LAB
ALBUMIN SERPL ELPH-MCNC: 3.6 G/DL — SIGNIFICANT CHANGE UP (ref 3.3–5)
ALP SERPL-CCNC: 58 U/L — SIGNIFICANT CHANGE UP (ref 40–120)
ALT FLD-CCNC: 19 U/L — SIGNIFICANT CHANGE UP (ref 10–45)
ANION GAP SERPL CALC-SCNC: 14 MMOL/L — SIGNIFICANT CHANGE UP (ref 5–17)
AST SERPL-CCNC: 29 U/L — SIGNIFICANT CHANGE UP (ref 10–40)
BILIRUB SERPL-MCNC: 0.8 MG/DL — SIGNIFICANT CHANGE UP (ref 0.2–1.2)
BUN SERPL-MCNC: 28 MG/DL — HIGH (ref 7–23)
CALCIUM SERPL-MCNC: 8.9 MG/DL — SIGNIFICANT CHANGE UP (ref 8.4–10.5)
CHLORIDE SERPL-SCNC: 97 MMOL/L — SIGNIFICANT CHANGE UP (ref 96–108)
CMV IGG FLD QL: 5.4 U/ML — HIGH
CMV IGG SERPL-IMP: POSITIVE
CMV IGM FLD-ACNC: <8 AU/ML — SIGNIFICANT CHANGE UP
CMV IGM SERPL QL: NEGATIVE — SIGNIFICANT CHANGE UP
CO2 SERPL-SCNC: 24 MMOL/L — SIGNIFICANT CHANGE UP (ref 22–31)
CREAT SERPL-MCNC: 1.3 MG/DL — SIGNIFICANT CHANGE UP (ref 0.5–1.3)
GLUCOSE BLDC GLUCOMTR-MCNC: 115 MG/DL — HIGH (ref 70–99)
GLUCOSE BLDC GLUCOMTR-MCNC: 154 MG/DL — HIGH (ref 70–99)
GLUCOSE BLDC GLUCOMTR-MCNC: 169 MG/DL — HIGH (ref 70–99)
GLUCOSE BLDC GLUCOMTR-MCNC: 210 MG/DL — HIGH (ref 70–99)
GLUCOSE SERPL-MCNC: 145 MG/DL — HIGH (ref 70–99)
HCT VFR BLD CALC: 38.6 % — LOW (ref 39–50)
HGB BLD-MCNC: 12.2 G/DL — LOW (ref 13–17)
MCHC RBC-ENTMCNC: 29.3 PG — SIGNIFICANT CHANGE UP (ref 27–34)
MCHC RBC-ENTMCNC: 31.5 GM/DL — LOW (ref 32–36)
MCV RBC AUTO: 92.9 FL — SIGNIFICANT CHANGE UP (ref 80–100)
PLATELET # BLD AUTO: 210 K/UL — SIGNIFICANT CHANGE UP (ref 150–400)
POTASSIUM SERPL-MCNC: 4.1 MMOL/L — SIGNIFICANT CHANGE UP (ref 3.5–5.3)
POTASSIUM SERPL-SCNC: 4.1 MMOL/L — SIGNIFICANT CHANGE UP (ref 3.5–5.3)
PROT SERPL-MCNC: 6.4 G/DL — SIGNIFICANT CHANGE UP (ref 6–8.3)
RBC # BLD: 4.15 M/UL — LOW (ref 4.2–5.8)
RBC # FLD: 19.5 % — HIGH (ref 10.3–14.5)
SODIUM SERPL-SCNC: 135 MMOL/L — SIGNIFICANT CHANGE UP (ref 135–145)
TACROLIMUS SERPL-MCNC: 17.5 NG/ML — SIGNIFICANT CHANGE UP
WBC # BLD: 3.4 K/UL — LOW (ref 3.8–10.5)
WBC # FLD AUTO: 3.4 K/UL — LOW (ref 3.8–10.5)

## 2018-05-25 PROCEDURE — 99232 SBSQ HOSP IP/OBS MODERATE 35: CPT

## 2018-05-25 PROCEDURE — 99233 SBSQ HOSP IP/OBS HIGH 50: CPT

## 2018-05-25 PROCEDURE — 99231 SBSQ HOSP IP/OBS SF/LOW 25: CPT

## 2018-05-25 PROCEDURE — 99232 SBSQ HOSP IP/OBS MODERATE 35: CPT | Mod: GC

## 2018-05-25 RX ORDER — INSULIN LISPRO 100/ML
8 VIAL (ML) SUBCUTANEOUS
Qty: 0 | Refills: 0 | Status: DISCONTINUED | OUTPATIENT
Start: 2018-05-25 | End: 2018-05-30

## 2018-05-25 RX ORDER — VALGANCICLOVIR 450 MG/1
450 TABLET, FILM COATED ORAL ONCE
Qty: 0 | Refills: 0 | Status: DISCONTINUED | OUTPATIENT
Start: 2018-05-25 | End: 2018-05-25

## 2018-05-25 RX ORDER — INSULIN GLARGINE 100 [IU]/ML
13 INJECTION, SOLUTION SUBCUTANEOUS AT BEDTIME
Qty: 0 | Refills: 0 | Status: DISCONTINUED | OUTPATIENT
Start: 2018-05-25 | End: 2018-06-02

## 2018-05-25 RX ORDER — DILTIAZEM HCL 120 MG
180 CAPSULE, EXT RELEASE 24 HR ORAL DAILY
Qty: 0 | Refills: 0 | Status: DISCONTINUED | OUTPATIENT
Start: 2018-05-26 | End: 2018-06-06

## 2018-05-25 RX ORDER — VALGANCICLOVIR 450 MG/1
450 TABLET, FILM COATED ORAL DAILY
Qty: 0 | Refills: 0 | Status: DISCONTINUED | OUTPATIENT
Start: 2018-05-26 | End: 2018-05-30

## 2018-05-25 RX ORDER — ENOXAPARIN SODIUM 100 MG/ML
70 INJECTION SUBCUTANEOUS
Qty: 0 | Refills: 0 | Status: DISCONTINUED | OUTPATIENT
Start: 2018-05-25 | End: 2018-06-04

## 2018-05-25 RX ORDER — MYCOPHENOLATE MOFETIL 250 MG/1
750 CAPSULE ORAL
Qty: 0 | Refills: 0 | Status: DISCONTINUED | OUTPATIENT
Start: 2018-05-25 | End: 2018-05-30

## 2018-05-25 RX ADMIN — Medication 2: at 08:11

## 2018-05-25 RX ADMIN — Medication 7.5 MILLIGRAM(S): at 06:13

## 2018-05-25 RX ADMIN — MYCOPHENOLATE MOFETIL 1000 MILLIGRAM(S): 250 CAPSULE ORAL at 08:14

## 2018-05-25 RX ADMIN — Medication 4: at 11:53

## 2018-05-25 RX ADMIN — Medication 1300 MILLIGRAM(S): at 06:12

## 2018-05-25 RX ADMIN — Medication 2 TABLET(S): at 08:15

## 2018-05-25 RX ADMIN — FAMOTIDINE 20 MILLIGRAM(S): 10 INJECTION INTRAVENOUS at 18:10

## 2018-05-25 RX ADMIN — Medication 8 UNIT(S): at 17:41

## 2018-05-25 RX ADMIN — Medication 2 TABLET(S): at 20:29

## 2018-05-25 RX ADMIN — MYCOPHENOLATE MOFETIL 750 MILLIGRAM(S): 250 CAPSULE ORAL at 18:11

## 2018-05-25 RX ADMIN — Medication 500000 UNIT(S): at 11:53

## 2018-05-25 RX ADMIN — Medication 1 APPLICATION(S): at 08:16

## 2018-05-25 RX ADMIN — MAGNESIUM OXIDE 400 MG ORAL TABLET 400 MILLIGRAM(S): 241.3 TABLET ORAL at 18:10

## 2018-05-25 RX ADMIN — ATOVAQUONE 1500 MILLIGRAM(S): 750 SUSPENSION ORAL at 11:49

## 2018-05-25 RX ADMIN — Medication 81 MILLIGRAM(S): at 11:49

## 2018-05-25 RX ADMIN — MAGNESIUM OXIDE 400 MG ORAL TABLET 400 MILLIGRAM(S): 241.3 TABLET ORAL at 06:12

## 2018-05-25 RX ADMIN — INSULIN GLARGINE 13 UNIT(S): 100 INJECTION, SOLUTION SUBCUTANEOUS at 22:46

## 2018-05-25 RX ADMIN — AMLODIPINE BESYLATE 5 MILLIGRAM(S): 2.5 TABLET ORAL at 06:12

## 2018-05-25 RX ADMIN — FAMOTIDINE 20 MILLIGRAM(S): 10 INJECTION INTRAVENOUS at 06:12

## 2018-05-25 RX ADMIN — Medication 6 UNIT(S): at 11:53

## 2018-05-25 RX ADMIN — SODIUM CHLORIDE 3 MILLILITER(S): 9 INJECTION INTRAMUSCULAR; INTRAVENOUS; SUBCUTANEOUS at 06:25

## 2018-05-25 RX ADMIN — VALGANCICLOVIR 450 MILLIGRAM(S): 450 TABLET, FILM COATED ORAL at 08:14

## 2018-05-25 RX ADMIN — Medication 240 MILLIGRAM(S): at 06:12

## 2018-05-25 RX ADMIN — Medication 6 UNIT(S): at 08:11

## 2018-05-25 RX ADMIN — Medication 1300 MILLIGRAM(S): at 13:36

## 2018-05-25 RX ADMIN — SODIUM CHLORIDE 3 MILLILITER(S): 9 INJECTION INTRAMUSCULAR; INTRAVENOUS; SUBCUTANEOUS at 21:22

## 2018-05-25 RX ADMIN — ENOXAPARIN SODIUM 80 MILLIGRAM(S): 100 INJECTION SUBCUTANEOUS at 06:13

## 2018-05-25 RX ADMIN — SODIUM CHLORIDE 3 MILLILITER(S): 9 INJECTION INTRAMUSCULAR; INTRAVENOUS; SUBCUTANEOUS at 13:35

## 2018-05-25 RX ADMIN — ENOXAPARIN SODIUM 70 MILLIGRAM(S): 100 INJECTION SUBCUTANEOUS at 18:11

## 2018-05-25 RX ADMIN — Medication 1300 MILLIGRAM(S): at 21:22

## 2018-05-25 RX ADMIN — Medication 500000 UNIT(S): at 18:14

## 2018-05-25 RX ADMIN — TACROLIMUS 6 MILLIGRAM(S): 5 CAPSULE ORAL at 08:13

## 2018-05-25 RX ADMIN — ATORVASTATIN CALCIUM 10 MILLIGRAM(S): 80 TABLET, FILM COATED ORAL at 21:22

## 2018-05-25 RX ADMIN — Medication 500000 UNIT(S): at 06:14

## 2018-05-25 RX ADMIN — Medication 1 TABLET(S): at 11:49

## 2018-05-25 RX ADMIN — TACROLIMUS 6 MILLIGRAM(S): 5 CAPSULE ORAL at 20:27

## 2018-05-25 RX ADMIN — Medication 500000 UNIT(S): at 23:26

## 2018-05-25 RX ADMIN — Medication 7.5 MILLIGRAM(S): at 18:13

## 2018-05-25 RX ADMIN — Medication 25 MILLIGRAM(S): at 06:12

## 2018-05-25 RX ADMIN — Medication 1 APPLICATION(S): at 18:13

## 2018-05-25 NOTE — PROGRESS NOTE ADULT - ATTENDING COMMENTS
meds; lovanox 70 bid, asa, mepron 1500, valcyte 450 bid, Lantis 13, Lispro 8 tid, pred 7.5 bid, prograf 6/6, cellcept 1g bid, cardizem 240, HTDZ 25, norvasc 5, Na bi, Mg ox, maveret   dropped to SR 70 overnight. 90/-110/,     135  |  97  |  28<H>  ----------------------------<  145<H>  4.1   |  24  |  1.30    Ca    8.9      25 May 2018 07:36    TPro  6.4  /  Alb  3.6  /  TBili  0.8  /  DBili  x   /  AST  29  /  ALT  19  /  AlkPhos  58  05-25                        12.2   3.4   )-----------( 210      ( 25 May 2018 07:36 )             38.6   Decrease valcyte 450 QD.   decrease cellcept 750 bid.  Check CMV PCR   Continue diabetic teaching.   Decrease cardizem (for BP, HR and prograf level).   Marvin Campbell meds; lovanox 70 bid, asa, mepron 1500, valcyte 450 bid, Lantis 13, Lispro 8 tid, pred 7.5 bid, prograf 6/6, cellcept 1g bid, cardizem 240, HTDZ 25, norvasc 5, Na bi, Mg ox, maveret   dropped to SR 70 overnight. 90/-110/,   no JVP, lungs clear, no murmers, abdo soft. no LE edema.    135  |  97  |  28<H>  ----------------------------<  145<H>  4.1   |  24  |  1.30    Ca    8.9      25 May 2018 07:36    TPro  6.4  /  Alb  3.6  /  TBili  0.8  /  DBili  x   /  AST  29  /  ALT  19  /  AlkPhos  58  05-25                        12.2   3.4   )-----------( 210      ( 25 May 2018 07:36 )             38.6   Case d/w with Dr Lujan  Decrease valcyte 450 QD.   decrease cellcept 750 bid.  please send CMV PCR   Continue diabetic teaching.   Decrease cardizem (for BP, HR and prograf level).   Marvin Campbell

## 2018-05-25 NOTE — PROGRESS NOTE ADULT - SUBJECTIVE AND OBJECTIVE BOX
Follow Up:  Heart transplant     Interval History: No further hyperglycemic events. Feels well, slept well. No pain. No fevers or chills.     ROS:    All other systems negative  Constitutional: no fever, no chills  HEENT:  no sore throat, no rhinorrhea  Cardiovascular:  no chest pain  Respiratory:  no SOB, no cough  GI:  a little loose stools yesterday and this morning, not watery. no abd pain, no vomiting  urinary: no dysuria  musculoskeletal:  no joint pain, no joint swelling    Allergies  No Known Allergies    ANTIMICROBIALS:  atovaquone Suspension 1500 daily  nystatin    Suspension [id number] every 6 hours  valGANciclovir 450 two times a day    OTHER MEDS:  amLODIPine   Tablet 5 milliGRAM(s) Oral daily  aspirin enteric coated 81 milliGRAM(s) Oral daily  atorvastatin 10 milliGRAM(s) Oral at bedtime  calcium carbonate 1250 mG + Vitamin D (OsCal 500 + D) 2 Tablet(s) Oral two times a day  diltiazem    milliGRAM(s) Oral daily  enoxaparin Injectable 80 milliGRAM(s) SubCutaneous two times a day  famotidine    Tablet 20 milliGRAM(s) Oral two times a day  hydrochlorothiazide 25 milliGRAM(s) Oral daily  insulin glargine Injectable (LANTUS) 12 Unit(s) SubCutaneous at bedtime  insulin lispro (HumaLOG) corrective regimen sliding scale   SubCutaneous three times a day before meals  insulin lispro (HumaLOG) corrective regimen sliding scale   SubCutaneous at bedtime  insulin lispro Injectable (HumaLOG) 6 Unit(s) SubCutaneous three times a day before meals  magnesium oxide 400 milliGRAM(s) Oral every 12 hours  mavyret 100mg/40mg  tablet 3 Tablet(s) 3 Tablet(s) Oral daily  multivitamin 1 Tablet(s) Oral daily  mycophenolate mofetil 1000 milliGRAM(s) Oral two times a day  predniSONE   Tablet 7.5 milliGRAM(s) Oral two times a day  silver sulfADIAZINE 1% Cream 1 Application(s) Topical two times a day  sodium bicarbonate 1300 milliGRAM(s) Oral every 8 hours  sodium chloride 0.9% lock flush 3 milliLiter(s) IV Push every 8 hours  tacrolimus 6 milliGRAM(s) Oral two times a day    Vital Signs Last 24 Hrs  T(C): 36.4 (25 May 2018 05:12), Max: 36.6 (24 May 2018 11:33)  T(F): 97.6 (25 May 2018 05:12), Max: 97.9 (24 May 2018 11:33)  HR: 91 (25 May 2018 05:12) (90 - 100)  BP: 110/75 (25 May 2018 05:12) (99/58 - 110/75)  BP(mean): --  RR: 18 (25 May 2018 05:12) (18 - 18)  SpO2: 98% (25 May 2018 05:12) (96% - 98%)    Physical Exam:  General:    NAD,  non toxic, A&O x 3  HEENT:    no oropharyngeal lesions,   no LAD,   neck supple  Cardiovascular:  regular rate and rhythm   Respiratory:  nonlabored, clear b/l,    no wheezing  abd:   soft,   BS +,   no tenderness,    no organomegaly  :   no  diaz  Musculoskeletal: no joint swelling,   no edema  Skin:    no rash. chest and abdominal scars well healed   Neurologic:     no focal deficit                          12.2   3.4   )-----------( 210      ( 25 May 2018 07:36 )             38.6           135  |  97  |  28<H>  ----------------------------<  145<H>  4.1   |  24  |  1.30    Ca    8.9      25 May 2018 07:36    TPro  6.4  /  Alb  3.6  /  TBili  0.8  /  DBili  x   /  AST  29  /  ALT  19  /  AlkPhos  58  25      Urinalysis Basic - ( 23 May 2018 20:06 )    Color: Yellow / Appearance: Clear / S.025 / pH: x  Gluc: x / Ketone: Negative  / Bili: Negative / Urobili: Negative mg/dL   Blood: x / Protein: Negative mg/dL / Nitrite: Negative   Leuk Esterase: Negative / RBC: x / WBC x   Sq Epi: x / Non Sq Epi: x / Bacteria: x        MICROBIOLOGY:  Blood and urine cultures in lab    RADIOLOGY:  Xray Chest 1 View- PORTABLE-Urgent (18 @ 20:32)   Right lower lung patchy opacities, some of which partially obscure the   right heart are likely due to atelectasis.

## 2018-05-25 NOTE — PROGRESS NOTE ADULT - ASSESSMENT
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 Admitted 5/23/18 with marked medication-induced hyperglycemia, now improved.   Immune compromised but clinically well, no fevers, does not seem to have been provoked by an infection. Cultures in lab. Acquired HCV from donor, on treatment with Mavyret. CMV viral load negative 4/19, on Valgancyclovir.     Recommend  -no additional antibiotics   -continue Mepron prophylaxis and Valganciclovir   -f/u blood and urine cultures in lab   -please repeat CMV titer  -please check HCV viral load 68M s/p cardiac transplant 2/23/18 for NICM, on Tacrolimus, Cellcept and Prednisone 12.5mg BID. Admitted 5/23/18 with marked medication-induced hyperglycemia, now improved.   Immune compromised but clinically well, no fevers, does not seem to have been provoked by an infection. Cultures in lab. Acquired HCV from donor, on treatment with Mavyret. CMV viral load negative 4/19, on Valgancyclovir.     Recommend  -no additional antibiotics   -continue Mepron prophylaxis and Valganciclovir   -f/u blood and urine cultures in lab   -please repeat CMV viral load  -please check HCV viral load

## 2018-05-25 NOTE — PROGRESS NOTE ADULT - PROBLEM SELECTOR PLAN 1
-Basal bolus dosing with Lantus 12 units q hs and Humalog 6 with meals   -Goal blood glucose 100-140  -Ck  CMV titer and HCV viral load pending.  Blood cultures pending.  -Glucose monitoring AC and HS.  Patient and family glucometer teaching implemented.    -Insulin administration instruction to be initiated with brother and patient.   Reviewed use of insulin pen, requires prompting for correct administration.  -Consistent Carb diet with nutrition follow up for dietary teaching

## 2018-05-25 NOTE — PROGRESS NOTE ADULT - PROBLEM SELECTOR PLAN 1
-Basal bolus dosing increased with Lantus 14 units q hs and Humalog 8 with meals   -Goal blood glucose 100-140  -Ck CMV titer and HCV viral load pending.  Blood cultures pending.  -Glucose monitoring AC and HS.  Patient and family glucometer teaching implemented.    -Insulin administration instruction to be initiated with brother and patient.   Reviewed use of insulin pen, requires prompting for correct administration.  -Consistent Carb diet with nutrition follow up for dietary teaching  - Endo following -Basal bolus dosing increased with Lantus 13 units q hs and Humalog 8 with meals   -Goal blood glucose 100-140  -Ck CMV titer and HCV viral load pending.  Blood cultures pending.  -Glucose monitoring AC and HS.  Patient and family glucometer teaching implemented.    -Insulin administration instruction to be initiated with brother and patient.   Reviewed use of insulin pen, requires prompting for correct administration.  -Consistent Carb diet with nutrition follow up for dietary teaching  - Endo following -Basal bolus dosing increased with Lantus 13 units q hs and Humalog 8 with meals   -Goal blood glucose 100-140  -Ck CMV titer and HCV viral load Sent STAT.  Blood cultures pending.  -Glucose monitoring AC and HS. Patient and family glucometer teaching implemented.    -Insulin administration instruction to be initiated with brother and patient.   Reviewed use of insulin pen, requires prompting for correct administration.  -Consistent Carb diet with nutrition follow up for dietary teaching  - Endo following

## 2018-05-25 NOTE — PROGRESS NOTE ADULT - ASSESSMENT
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 Now presenting with hyperglycemia, glucose initally >700,  likely 2/2 to steroids vs. prograf.   Hospital course:  5/24: Weaned off Insulin gtt transitioned to basal bolus insulin. Glucometer/Insulin teaching initiated  5/25: Overnight: HR with low noted to be 75-80 with sleep; Decrease Cardizem; Prograft level 17.5; decrease Tacrolimus to 5 mg PO BID; Continue with Diabetic teaching. Endo following for hyperglycemia; Lantus increased to 14 units HA and pre meal increased to 8 units TID.  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 Now presenting with hyperglycemia, glucose initally >700,  likely 2/2 to steroids vs. prograf.   Hospital course:  5/24: Weaned off Insulin gtt transitioned to basal bolus insulin. Glucometer/Insulin teaching initiated  5/25: Overnight: HR with low noted to be 75-80 with sleep; Hypotensive with SPB 91; Decrease Cardizem; Prograft level 17.5; decrease Tacrolimus to 5 mg PO BID; Continue with Diabetic teaching. Endo following for hyperglycemia; Lantus increased to 13 units HA and pre meal increased to 8 units TID.  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 Now presenting with hyperglycemia, glucose initally >700,  likely 2/2 to steroids vs. prograf.   Hospital course:  5/24: Weaned off Insulin gtt transitioned to basal bolus insulin. Glucometer/Insulin teaching initiated  5/25: Overnight: HR with low noted to be 75-80 with sleep; Hypotensive with SPB 91; Decrease Cardizem to 180 mg daily; Prograft level 17.5; Decrease valganciclovir 450 mg po daily and Cellcept to 750 mg PO BID. Maintain Tacrolimus 6 mg PO BID; Continue with Diabetic teaching. Endo following for hyperglycemia; Lantus increased to 13 units HA and pre meal increased to 8 units TID.  Disposition: Home once medically cleared

## 2018-05-25 NOTE — PROGRESS NOTE ADULT - SUBJECTIVE AND OBJECTIVE BOX
VITAL SIGNS  T(F): 97.5  HR: 78-90  Tele; SR  BP: 99/58  RR: 18  SpO2: 98%    05-24-18 @ 07:01  -  05-25-18 @ 03:30  --------------------------------------------------------  OUT: 1150 mL / NET: -1150 mL    LAB  5/25 @ 0715: pending    CAPILLARY BLOOD GLUCOSE  POCT Blood Glucose.: 150 mg/dL (24 May 2018 22:09)  POCT Blood Glucose.: 105 mg/dL (24 May 2018 17:06)  POCT Blood Glucose.: 88 mg/dL (24 May 2018 13:38)  POCT Blood Glucose.: 84 mg/dL (24 May 2018 13:04)    DIAGNOSTICS    MEDICATIONS  Lovenox 80 BID  asa  amLODIPine   Tablet 5 milliGRAM(s) Oral daily  atorvastatin 10 milliGRAM(s) Oral at bedtime  atovaquone Suspension 1500 milliGRAM(s) Oral daily  diltiazem    milliGRAM(s) Oral daily  famotidine    Tablet 20 milliGRAM(s) Oral two times a day  hydrochlorothiazide 25 milliGRAM(s) Oral daily  insulin glargine Injectable (LANTUS) 12 Unit(s) SubCutaneous at bedtime  insulin lispro (HumaLOG) corrective regimen sliding scale   SubCutaneous three times a day before meals  insulin lispro (HumaLOG) corrective regimen sliding scale   SubCutaneous at bedtime  insulin lispro Injectable (HumaLOG) 6 Unit(s) SubCutaneous three times a day before meals  nystatin    Suspension 755298 Unit(s) Oral every 6 hours  predniSONE   Tablet 7.5 milliGRAM(s) Oral two times a day  valGANciclovir 450 milliGRAM(s) Oral two times a day      PHYSICAL EXAM

## 2018-05-25 NOTE — PROGRESS NOTE ADULT - PROBLEM SELECTOR PLAN 1
-test BG AC/HS. Please allow patient to practice testing finger sticks. Would send RX to [facility] for Glucometer, test strips and lancets to allow patient/brother to learn on his home machine  -Increase Lantus 13 units QHS  -Increase Humalog 8 units AC meals  -c/w Humalog moderate correction scale AC and Low HS scale for now  -Please review Insulin Pen w/patient daily until able to provide teach back  -RD to reinforce nutritional education  -Needs Rx for:  [employer] Insulin pen/Humalog Kwik insulin pen/[place] insulin pen/glucose meter/strips/lancets  Will need home care to reinforce education. Brother might be able to assist pt at home.  -Has f/u apt at endo clinic on 6/21/18 at 10:40am . [address].  -Plan discussed with pt/team/staff.  pager: [phone]

## 2018-05-25 NOTE — PROGRESS NOTE ADULT - PROBLEM SELECTOR PLAN 3
CK FK level   Prograf 6mg bid   cellcept 1g bid,   pred 7.5 bid CK FK level   Prograf 6mg bid   Decrease Cellcept to 750 mg PO bid,   Continue with prednisone 7.5 mg po BID

## 2018-05-25 NOTE — PROGRESS NOTE ADULT - PROBLEM SELECTOR PLAN 2
HR 78-85 overnight, during sleep  On diltiazem 240mg daily, will review with transplant cardiology team

## 2018-05-25 NOTE — PROGRESS NOTE ADULT - ATTENDING COMMENTS
Patient seen and examined with Dr. Fonseca  I agree with his history, exam findings and plans as noted above  Feeling improved, with improved glucose control but with developing leukopenia and mild diarrhea  ? CMV flare    CMV PCR to be sent today  would also check HCV viral load  Continue the Valganciclovir  CMV PCR will be run on Monday by the lab.

## 2018-05-25 NOTE — PROGRESS NOTE ADULT - ASSESSMENT
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 Now presenting with hyperglycemia likely 2/2 to steroids vs. prograf.     Otoniel Zimmerman MD PGY4    Please call me at [phone] for any further questions up till 5 pm. For after hours, please call the covering cardiology on call fellow at [phone] for any further assistance.    #Hyperglycemia  - Now on basal bolus. C/w endocrine recs  - Nutrition and Dietary education  - Insulin injection education    #Immunosuppresion  - c/w Cellcept 1000mg BID, Prednisone 7.5mg BID, will decrease prograf to 5mg bid    #PPX  - Valcyte 450mg BID, Mepron, Nystatin     #DVT  - Lovenox 80mg BID    #HTN  - Will reduce Cardizem to 120mg, c/w hCTZ and Norvasc

## 2018-05-25 NOTE — PROGRESS NOTE ADULT - ASSESSMENT
69 y/o M w/h/o HTN/CHF/STV/Tony Fib>AICD/PAF/NICM>LVAD and more recently heart tx c/b DVT on Lovenox. 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 Pt now requiring insulin. Participating in education but needs daily  reinforcement of teaching. Will slightly increase basal and premeal to improve glycemic control. BG goal (100-180mg/dl). Discussed importance of not snacking between meals and following consistent carb diet.

## 2018-05-25 NOTE — PROGRESS NOTE ADULT - SUBJECTIVE AND OBJECTIVE BOX
Interval History:  No acute events overnight noted. Pt still with blurry vision. Otherwise no new complaints this am. Denies CP, SOB, N/v/d, dizziness, fevers or chills. Low heart rate overnight noted. Assymptomatic    Medications:  amLODIPine   Tablet 5 milliGRAM(s) Oral daily  aspirin enteric coated 81 milliGRAM(s) Oral daily  atorvastatin 10 milliGRAM(s) Oral at bedtime  atovaquone Suspension 1500 milliGRAM(s) Oral daily  calcium carbonate 1250 mG + Vitamin D (OsCal 500 + D) 2 Tablet(s) Oral two times a day  diltiazem    milliGRAM(s) Oral daily  enoxaparin Injectable 80 milliGRAM(s) SubCutaneous two times a day  famotidine    Tablet 20 milliGRAM(s) Oral two times a day  hydrochlorothiazide 25 milliGRAM(s) Oral daily  insulin glargine Injectable (LANTUS) 12 Unit(s) SubCutaneous at bedtime  insulin lispro (HumaLOG) corrective regimen sliding scale   SubCutaneous three times a day before meals  insulin lispro (HumaLOG) corrective regimen sliding scale   SubCutaneous at bedtime  insulin lispro Injectable (HumaLOG) 6 Unit(s) SubCutaneous three times a day before meals  magnesium oxide 400 milliGRAM(s) Oral every 12 hours  mavyret 100mg/40mg  tablet 3 Tablet(s) 3 Tablet(s) Oral daily  multivitamin 1 Tablet(s) Oral daily  mycophenolate mofetil 1000 milliGRAM(s) Oral two times a day  nystatin    Suspension 594283 Unit(s) Oral every 6 hours  predniSONE   Tablet 7.5 milliGRAM(s) Oral two times a day  silver sulfADIAZINE 1% Cream 1 Application(s) Topical two times a day  sodium bicarbonate 1300 milliGRAM(s) Oral every 8 hours  sodium chloride 0.9% lock flush 3 milliLiter(s) IV Push every 8 hours  tacrolimus 6 milliGRAM(s) Oral two times a day  valGANciclovir 450 milliGRAM(s) Oral two times a day    Vitals:  T(C): 36.4 (18 @ 05:12), Max: 36.6 (18 @ 11:33)  HR: 91 (18 @ 05:12) (90 - 100)  BP: 110/75 (18 @ 05:12) (99/58 - 110/75)  RR: 18 (18 @ 05:12) (18 - 18)  SpO2: 98% (18 @ 05:12) (96% - 98%)    Daily     Daily Weight in k.5 (24 May 2018 15:16)    Weight (kg): 66.5 ( @ 18:20)    I&O's Summary    24 May 2018 07:01  -  25 May 2018 07:00  --------------------------------------------------------  IN: 1195.7 mL / OUT: 1150 mL / NET: 45.7 mL        Physical Exam:  Appearance: No Acute Distress  HEENT: No JVD  Cardiovascular: Normal S1 S2, +murmur over precordium  Respiratory: Clear to auscultation bilaterally  Gastrointestinal: Soft, Non-tender	  Skin: R third digit wrapped  Neurologic: Non-focal  Extremities: No LE edema  Psychiatry: A & O x 3, Mood & affect appropriate    Labs:                        12.2   3.4   )-----------( 210      ( 25 May 2018 07:36 )             38.6         135  |  97  |  28<H>  ----------------------------<  145<H>  4.1   |  24  |  1.30    Ca    8.9      25 May 2018 07:36    TPro  6.4  /  Alb  3.6  /  TBili  0.8  /  DBili  x   /  AST  29  /  ALT  19  /  AlkPhos  58      PT/INR - ( 23 May 2018 16:39 )   PT: 10.8 sec;   INR: 1.00 ratio         PTT - ( 23 May 2018 16:39 )  PTT:32.2 sec    Tacrolimus (), Serum (18 @ 08:21)    Tacrolimus (), Serum: 17.5 (17.5, 17.5)        TELEMETRY:  Sinus-Sinus Tachycardia 70s-110s  [ ] Echocardiogram:

## 2018-05-25 NOTE — CHART NOTE - NSCHARTNOTEFT_GEN_A_CORE
Source: Patient [ x ]    Family [ ]     other [ x ] RN, Comprehensive chart review     Pt seen for nutrition follow up. Reports good appetite and eating 100% of meals, confirmed by RN. Pt denies GI distress at this time. Pt refused diet education at this time, made aware RD remains available.     Pt admitted with hyperglycemia, BC >600 possiblye steroid induced; resolving polyuria, polydipsia, and blurred vision as per chart. Noted insulin teaching has been initiated.    Diet : consistent CHO, low fiber, no concentrated potassium       Admission Weight: 66.5kg  Current Weight: 68.4kg  Weight fluctuations noted, likely due to fluid shifts. No peripheral edema noted at this time.    Pertinent Medications: MEDICATIONS  (STANDING):  amLODIPine   Tablet 5 milliGRAM(s) Oral daily  aspirin enteric coated 81 milliGRAM(s) Oral daily  atorvastatin 10 milliGRAM(s) Oral at bedtime  atovaquone Suspension 1500 milliGRAM(s) Oral daily  calcium carbonate 1250 mG + Vitamin D (OsCal 500 + D) 2 Tablet(s) Oral two times a day  diltiazem    milliGRAM(s) Oral daily  enoxaparin Injectable 80 milliGRAM(s) SubCutaneous two times a day  famotidine    Tablet 20 milliGRAM(s) Oral two times a day  hydrochlorothiazide 25 milliGRAM(s) Oral daily  insulin glargine Injectable (LANTUS) 12 Unit(s) SubCutaneous at bedtime  insulin lispro (HumaLOG) corrective regimen sliding scale   SubCutaneous three times a day before meals  insulin lispro (HumaLOG) corrective regimen sliding scale   SubCutaneous at bedtime  insulin lispro Injectable (HumaLOG) 6 Unit(s) SubCutaneous three times a day before meals  magnesium oxide 400 milliGRAM(s) Oral every 12 hours  mavyret 100mg/40mg  tablet 3 Tablet(s) 3 Tablet(s) Oral daily  multivitamin 1 Tablet(s) Oral daily  mycophenolate mofetil 1000 milliGRAM(s) Oral two times a day  nystatin    Suspension 421856 Unit(s) Oral every 6 hours  predniSONE   Tablet 7.5 milliGRAM(s) Oral two times a day  silver sulfADIAZINE 1% Cream 1 Application(s) Topical two times a day  sodium bicarbonate 1300 milliGRAM(s) Oral every 8 hours  sodium chloride 0.9% lock flush 3 milliLiter(s) IV Push every 8 hours  tacrolimus 6 milliGRAM(s) Oral two times a day  valGANciclovir 450 milliGRAM(s) Oral two times a day    MEDICATIONS  (PRN):    Pertinent Labs:    Complete Blood Count (05.25.18 @ 07:36)    Hemoglobin: 12.2 g/dL - low    Hematocrit: 38.6 % - low  Comprehensive Metabolic Panel (05.25.18 @ 07:36)    Sodium, Serum: 135 mmol/L - low    Potassium, Serum: 4.1 mmol/L    Chloride, Serum: 97 mmol/L    Carbon Dioxide, Serum: 24 mmol/L - low    Anion Gap, Serum: 14 mmol/L    Blood Urea Nitrogen, Serum: 28 mg/dL - high    Creatinine, Serum: 1.30 mg/dL    Glucose, Serum: 145 mg/dL - high    Calcium, Total Serum: 8.9 mg/dL    Protein Total, Serum: 6.4 g/dL    Albumin, Serum: 3.6 g/dL    Bilirubin Total, Serum: 0.8 mg/dL    Alkaline Phosphatase, Serum: 58 U/L    Aspartate Aminotransferase (AST/SGOT): 29 U/L - high    Alanine Aminotransferase (ALT/SGPT): 19 U/L     Point of Care Testing BG: (5/25)154, (5/24).    Skin: No pressure ulcers noted.    Estimated Needs:   [ x ] no change since previous assessment  [ ] recalculated:       Previous Nutrition Diagnosis:   Altered nutrition - related laboratory values remains.  Unintended weight loss remains, being addressed with good PO intake.       New Nutrition Diagnosis: [ x ] not applicable    Interventions:   1. Encourage pt to maintain good PO intake.   2. Provide food preferences within therapeutic diet when requested.   3. Encourage use of alternate menu options as needed.  4. RD to follow up with diet education as accepted by pt.       Monitoring and Evaluation:     [ x ] PO intake [ x ] Tolerance to diet prescription [ x ] weights [ x ] follow up per protocol    [ ] other:

## 2018-05-25 NOTE — PROGRESS NOTE ADULT - SUBJECTIVE AND OBJECTIVE BOX
Diabetes Follow up note:  Interval Hx:      Review of Systems:  General:  GI: Tolerating POs without any N/V/D/ABD PAIN.  CV: No CP/SOB  ENDO: No S&Sx of hypoglycemia  MEDS:  atorvastatin 10 milliGRAM(s) Oral at bedtime  insulin glargine Injectable (LANTUS) 12 Unit(s) SubCutaneous at bedtime  insulin lispro (HumaLOG) corrective regimen sliding scale   SubCutaneous three times a day before meals  insulin lispro (HumaLOG) corrective regimen sliding scale   SubCutaneous at bedtime  insulin lispro Injectable (HumaLOG) 6 Unit(s) SubCutaneous three times a day before meals  predniSONE   Tablet 7.5 milliGRAM(s) Oral two times a day    atovaquone Suspension 1500 milliGRAM(s) Oral daily  nystatin    Suspension 553805 Unit(s) Oral every 6 hours  valGANciclovir 450 milliGRAM(s) Oral two times a day    Allergies    No Known Allergies        PE:  General:  Vital Signs Last 24 Hrs  T(C): 36.4 (25 May 2018 11:34), Max: 36.4 (24 May 2018 22:00)  T(F): 97.5 (25 May 2018 11:34), Max: 97.6 (25 May 2018 05:12)  HR: 102 (25 May 2018 11:34) (90 - 102)  BP: 91/59 (25 May 2018 11:34) (91/59 - 110/75)  BP(mean): 71 (25 May 2018 11:34) (71 - 71)  RR: 18 (25 May 2018 11:34) (18 - 18)  SpO2: 100% (25 May 2018 11:34) (98% - 100%)  Abd: Soft, NT,ND,   Extremities: Warm  Neuro: A&O X3    LABS:    POCT Blood Glucose.: 210 mg/dL (05-25-18 @ 11:42)  POCT Blood Glucose.: 154 mg/dL (05-25-18 @ 07:39)  POCT Blood Glucose.: 150 mg/dL (05-24-18 @ 22:09)  POCT Blood Glucose.: 105 mg/dL (05-24-18 @ 17:06)  POCT Blood Glucose.: 88 mg/dL (05-24-18 @ 13:38)  POCT Blood Glucose.: 84 mg/dL (05-24-18 @ 13:04)  POCT Blood Glucose.: 102 mg/dL (05-24-18 @ 11:52)  POCT Blood Glucose.: 105 mg/dL (05-24-18 @ 11:05)  POCT Blood Glucose.: 149 mg/dL (05-24-18 @ 10:04)  POCT Blood Glucose.: 131 mg/dL (05-24-18 @ 09:07)  POCT Blood Glucose.: 127 mg/dL (05-24-18 @ 08:09)  POCT Blood Glucose.: 134 mg/dL (05-24-18 @ 07:10)  POCT Blood Glucose.: 151 mg/dL (05-24-18 @ 05:55)  POCT Blood Glucose.: 127 mg/dL (05-24-18 @ 05:10)  POCT Blood Glucose.: 113 mg/dL (05-24-18 @ 04:36)  POCT Blood Glucose.: 101 mg/dL (05-24-18 @ 03:41)  POCT Blood Glucose.: 98 mg/dL (05-24-18 @ 03:06)  POCT Blood Glucose.: 95 mg/dL (05-24-18 @ 03:03)  POCT Blood Glucose.: 136 mg/dL (05-24-18 @ 01:47)  POCT Blood Glucose.: 90 mg/dL (05-24-18 @ 01:03)  POCT Blood Glucose.: 116 mg/dL (05-23-18 @ 23:58)  POCT Blood Glucose.: 165 mg/dL (05-23-18 @ 23:09)  POCT Blood Glucose.: 349 mg/dL (05-23-18 @ 21:31)  POCT Blood Glucose.: 475 mg/dL (05-23-18 @ 19:35)  POCT Blood Glucose.: 570 mg/dL (05-23-18 @ 19:33)  POCT Blood Glucose.: >600 mg/dL (05-23-18 @ 18:08)  POCT Blood Glucose.: >600 mg/dL (05-23-18 @ 16:03)                            12.2   3.4   )-----------( 210      ( 25 May 2018 07:36 )             38.6       05-25    135  |  97  |  28<H>  ----------------------------<  145<H>  4.1   |  24  |  1.30    Ca    8.9      25 May 2018 07:36    TPro  6.4  /  Alb  3.6  /  TBili  0.8  /  DBili  x   /  AST  29  /  ALT  19  /  AlkPhos  58  05-25      Thyroid Function Tests:  05-24 @ 05:48 TSH 0.86 FreeT4 -- T3 61 Anti TPO -- Anti Thyroglobulin Ab -- TSI --      Hemoglobin A1C, Whole Blood: 10.4 % <H> [4.0 - 5.6] (05-23-18 @ 20:06)  Hemoglobin A1C, Whole Blood: 5.3 % [4.0 - 5.6] (03-18-18 @ 11:20)            Contact number: deanne [phone] or [phone] Diabetes Follow up note:  Interval Hx:  67 y/o M w/h/o HTN/CHF/STV/Tony Fib>AICD/PAF/NICM>LVAD and more recently heart tx c/b DVT on Lovenox. Here with S&Sx of hyperglycemia and BG >900s likely due to steroid therapy and prograf. Pt now requiring insulin. Transitioned off insulin gtt to basal/bolus yesterday. BG values 100-low 200's in last 24 hours. Pt reports receiving lots of education but still does not feel comfortable with all of the steps of the insulin pen.     Review of Systems:  General: "I'm feeling better"  GI: Tolerating POs without any N/V/D/ABD PAIN.  CV: No CP/SOB  ENDO: No S&Sx of hypoglycemia  MEDS:  atorvastatin 10 milliGRAM(s) Oral at bedtime  insulin glargine Injectable (LANTUS) 12 Unit(s) SubCutaneous at bedtime  insulin lispro (HumaLOG) corrective regimen sliding scale   SubCutaneous three times a day before meals  insulin lispro (HumaLOG) corrective regimen sliding scale   SubCutaneous at bedtime  insulin lispro Injectable (HumaLOG) 6 Unit(s) SubCutaneous three times a day before meals  predniSONE   Tablet 7.5 milliGRAM(s) Oral two times a day    atovaquone Suspension 1500 milliGRAM(s) Oral daily  nystatin    Suspension 584630 Unit(s) Oral every 6 hours  valGANciclovir 450 milliGRAM(s) Oral two times a day    Allergies    No Known Allergies        PE:  General: Male sitting in chair. NAD.   Vital Signs Last 24 Hrs  T(C): 36.4 (25 May 2018 11:34), Max: 36.4 (24 May 2018 22:00)  T(F): 97.5 (25 May 2018 11:34), Max: 97.6 (25 May 2018 05:12)  HR: 102 (25 May 2018 11:34) (90 - 102)  BP: 91/59 (25 May 2018 11:34) (91/59 - 110/75)  BP(mean): 71 (25 May 2018 11:34) (71 - 71)  RR: 18 (25 May 2018 11:34) (18 - 18)  SpO2: 100% (25 May 2018 11:34) (98% - 100%)  CV: S1, S2. NSR on monitor.   Chest: Midsternal incision healed  Abd: Soft, NT,ND,   Extremities: Warm  Neuro: A&O X3    LABS:    POCT Blood Glucose.: 210 mg/dL (05-25-18 @ 11:42)  POCT Blood Glucose.: 154 mg/dL (05-25-18 @ 07:39)  POCT Blood Glucose.: 150 mg/dL (05-24-18 @ 22:09)  POCT Blood Glucose.: 105 mg/dL (05-24-18 @ 17:06)  POCT Blood Glucose.: 88 mg/dL (05-24-18 @ 13:38)  POCT Blood Glucose.: 84 mg/dL (05-24-18 @ 13:04)  POCT Blood Glucose.: 102 mg/dL (05-24-18 @ 11:52)  POCT Blood Glucose.: 105 mg/dL (05-24-18 @ 11:05)  POCT Blood Glucose.: 149 mg/dL (05-24-18 @ 10:04)  POCT Blood Glucose.: 131 mg/dL (05-24-18 @ 09:07)  POCT Blood Glucose.: 127 mg/dL (05-24-18 @ 08:09)  POCT Blood Glucose.: 134 mg/dL (05-24-18 @ 07:10)  POCT Blood Glucose.: 151 mg/dL (05-24-18 @ 05:55)  POCT Blood Glucose.: 127 mg/dL (05-24-18 @ 05:10)  POCT Blood Glucose.: 113 mg/dL (05-24-18 @ 04:36)  POCT Blood Glucose.: 101 mg/dL (05-24-18 @ 03:41)  POCT Blood Glucose.: 98 mg/dL (05-24-18 @ 03:06)  POCT Blood Glucose.: 95 mg/dL (05-24-18 @ 03:03)  POCT Blood Glucose.: 136 mg/dL (05-24-18 @ 01:47)  POCT Blood Glucose.: 90 mg/dL (05-24-18 @ 01:03)  POCT Blood Glucose.: 116 mg/dL (05-23-18 @ 23:58)  POCT Blood Glucose.: 165 mg/dL (05-23-18 @ 23:09)  POCT Blood Glucose.: 349 mg/dL (05-23-18 @ 21:31)  POCT Blood Glucose.: 475 mg/dL (05-23-18 @ 19:35)  POCT Blood Glucose.: 570 mg/dL (05-23-18 @ 19:33)  POCT Blood Glucose.: >600 mg/dL (05-23-18 @ 18:08)  POCT Blood Glucose.: >600 mg/dL (05-23-18 @ 16:03)                            12.2   3.4   )-----------( 210      ( 25 May 2018 07:36 )             38.6       05-25    135  |  97  |  28<H>  ----------------------------<  145<H>  4.1   |  24  |  1.30    Ca    8.9      25 May 2018 07:36    TPro  6.4  /  Alb  3.6  /  TBili  0.8  /  DBili  x   /  AST  29  /  ALT  19  /  AlkPhos  58  05-25      Thyroid Function Tests:  05-24 @ 05:48 TSH 0.86 FreeT4 -- T3 61 Anti TPO -- Anti Thyroglobulin Ab -- TSI --      Hemoglobin A1C, Whole Blood: 10.4 % <H> [4.0 - 5.6] (05-23-18 @ 20:06)  Hemoglobin A1C, Whole Blood: 5.3 % [4.0 - 5.6] (03-18-18 @ 11:20)            Contact number: deanne [phone] or [phone]

## 2018-05-25 NOTE — PROGRESS NOTE ADULT - SUBJECTIVE AND OBJECTIVE BOX
VITAL SIGNS    Subjective: Patient states: "I'm feeling alright." Denies CP, palpitation, SOB, LOBATO, N/V/D, visual disturbance, HA, dizziness, fever, or chills.  No acute events noted over night.      Telemetry: NSR 80-90 ( episodic 78 overnight)     Vital Signs Last 24 Hrs  T(C): 36.4 (18 @ 11:34), Max: 36.4 (18 @ 22:00)  T(F): 97.5 (18 @ 11:34), Max: 97.6 (18 @ 05:12)  HR: 102 (18 @ 11:34) (90 - 102)  BP: 91/59 (18 @ 11:34) (91/59 - 110/75)  RR: 18 (18 @ 11:34) (18 - 18)  SpO2: 100% (18 @ 11:34) (98% - 100%)            @ 07:01  -   @ 07:00  --------------------------------------------------------  IN: 1195.7 mL / OUT: 1150 mL / NET: 45.7 mL     @ 07:01  -   @ 12:31  --------------------------------------------------------  IN: 200 mL / OUT: 0 mL / NET: 200 mL    Daily     Daily Weight in k.2 (25 May 2018 12:23)    CAPILLARY BLOOD GLUCOSE  88 (24 May 2018 13:30)  84 (24 May 2018 13:00)    POCT Blood Glucose.: 210 mg/dL (25 May 2018 11:42)  POCT Blood Glucose.: 154 mg/dL (25 May 2018 07:39)  POCT Blood Glucose.: 150 mg/dL (24 May 2018 22:09)  POCT Blood Glucose.: 105 mg/dL (24 May 2018 17:06)  POCT Blood Glucose.: 88 mg/dL (24 May 2018 13:38)  POCT Blood Glucose.: 84 mg/dL (24 May 2018 13:04)                        PHYSICAL EXAM    Neurology: alert and oriented x 3, nonfocal, no gross deficits    CV: (+) S1 and S2, No murmurs, rubs, gallops or clicks     Sternal Wound:  MSI -->CDI , sternum stable    Lungs: CTA B/L     Abdomen: soft, nontender, nondistended, positive bowel sounds, (+) Flatus; (+) BM     :  Voiding               Extremities:  B/L LE (-) edema; negative calf tenderness; (+) 2 DP palpable        amLODIPine  Tablet 5 milliGRAM(s) Oral daily  aspirin enteric coated 81 milliGRAM(s) Oral daily  atorvastatin 10 milliGRAM(s) Oral at bedtime  atovaquone Suspension 1500 milliGRAM(s) Oral daily  calcium carbonate 1250 mG + Vitamin D (OsCal 500 + D) 2 Tablet(s) Oral two times a day  diltiazem  milliGRAM(s) Oral daily  enoxaparin Injectable 70 milliGRAM(s) SubCutaneous two times a day  famotidine Tablet 20 milliGRAM(s) Oral two times a day  hydrochlorothiazide 25 milliGRAM(s) Oral daily  insulin glargine Injectable (LANTUS) 13 Unit(s) SubCutaneous at bedtime  insulin lispro (HumaLOG) corrective regimen sliding scale SubCutaneous three times a day before meals  insulin lispro (HumaLOG) corrective regimen sliding scale SubCutaneous at bedtime  insulin lispro Injectable (HumaLOG) 8 Unit(s) SubCutaneous three times a day before meals  magnesium oxide 400 milliGRAM(s) Oral every 12 hours  mavyret 100mg/40mg  tablet 3 Tablet(s) 3 Tablet(s) Oral daily  multivitamin 1 Tablet(s) Oral daily  mycophenolate mofetil 1000 milliGRAM(s) Oral two times a day  nystatin  Suspension 979662 Unit(s) Oral every 6 hours  predniSONE Tablet 7.5 milliGRAM(s) Oral two times a day  silver sulfADIAZINE 1% Cream 1 Application(s) Topical two times a day  sodium bicarbonate 1300 milliGRAM(s) Oral every 8 hours  sodium chloride 0.9% lock flush 3 milliLiter(s) IV Push every 8 hours  tacrolimus 6 milliGRAM(s) Oral two times a day  valGANciclovir 450 milliGRAM(s) Oral two times a day    Physical Therapy Rec:   Home  [  ]   Home w/ PT  [ X ]  Rehab  [  ]    Discussed with Cardiothoracic Team at AM rounds. VITAL SIGNS    Subjective: Patient states: "I'm feeling alright." Denies CP, palpitation, SOB, LOBATO, N/V/D, visual disturbance, HA, dizziness, fever, or chills.  No acute events noted over night.      Telemetry: NSR 80-90 ( episodic 78 overnight)     Vital Signs Last 24 Hrs  T(C): 36.4 (18 @ 11:34), Max: 36.4 (18 @ 22:00)  T(F): 97.5 (18 @ 11:34), Max: 97.6 (18 @ 05:12)  HR: 102 (18 @ 11:34) (90 - 102)  BP: 91/59 (18 @ 11:34) (91/59 - 110/75)  RR: 18 (18 @ 11:34) (18 - 18)  SpO2: 100% (18 @ 11:34) (98% - 100%)            @ 07:01  -   @ 07:00  --------------------------------------------------------  IN: 1195.7 mL / OUT: 1150 mL / NET: 45.7 mL     @ 07:01  -   @ 12:31  --------------------------------------------------------  IN: 200 mL / OUT: 0 mL / NET: 200 mL    Daily     Daily Weight in k.2 (25 May 2018 12:23)    CAPILLARY BLOOD GLUCOSE  88 (24 May 2018 13:30)  84 (24 May 2018 13:00)    POCT Blood Glucose.: 210 mg/dL (25 May 2018 11:42)  POCT Blood Glucose.: 154 mg/dL (25 May 2018 07:39)  POCT Blood Glucose.: 150 mg/dL (24 May 2018 22:09)  POCT Blood Glucose.: 105 mg/dL (24 May 2018 17:06)  POCT Blood Glucose.: 88 mg/dL (24 May 2018 13:38)  POCT Blood Glucose.: 84 mg/dL (24 May 2018 13:04)                        PHYSICAL EXAM    Neurology: alert and oriented x 3, nonfocal, no gross deficits    CV: (+) S1 and S2, No murmurs, rubs, gallops or clicks     Sternal Wound:  MSI -->CDI , sternum stable    Lungs: CTA B/L     Abdomen: soft, nontender, nondistended, positive bowel sounds, (+) Flatus; (+) BM     :  Voiding               Extremities:  B/L LE (-) edema; negative calf tenderness; (+) 2 DP palpable; Right UE third digit with DSD in place C/D/I     amLODIPine  Tablet 5 milliGRAM(s) Oral daily  aspirin enteric coated 81 milliGRAM(s) Oral daily  atorvastatin 10 milliGRAM(s) Oral at bedtime  atovaquone Suspension 1500 milliGRAM(s) Oral daily  calcium carbonate 1250 mG + Vitamin D (OsCal 500 + D) 2 Tablet(s) Oral two times a day  diltiazem  milliGRAM(s) Oral daily  enoxaparin Injectable 70 milliGRAM(s) SubCutaneous two times a day  famotidine Tablet 20 milliGRAM(s) Oral two times a day  hydrochlorothiazide 25 milliGRAM(s) Oral daily  insulin glargine Injectable (LANTUS) 13 Unit(s) SubCutaneous at bedtime  insulin lispro (HumaLOG) corrective regimen sliding scale SubCutaneous three times a day before meals  insulin lispro (HumaLOG) corrective regimen sliding scale SubCutaneous at bedtime  insulin lispro Injectable (HumaLOG) 8 Unit(s) SubCutaneous three times a day before meals  magnesium oxide 400 milliGRAM(s) Oral every 12 hours  mavyret 100mg/40mg  tablet 3 Tablet(s) 3 Tablet(s) Oral daily  multivitamin 1 Tablet(s) Oral daily  mycophenolate mofetil 1000 milliGRAM(s) Oral two times a day  nystatin  Suspension 208553 Unit(s) Oral every 6 hours  predniSONE Tablet 7.5 milliGRAM(s) Oral two times a day  silver sulfADIAZINE 1% Cream 1 Application(s) Topical two times a day  sodium bicarbonate 1300 milliGRAM(s) Oral every 8 hours  sodium chloride 0.9% lock flush 3 milliLiter(s) IV Push every 8 hours  tacrolimus 6 milliGRAM(s) Oral two times a day  valGANciclovir 450 milliGRAM(s) Oral two times a day    Physical Therapy Rec:   Home  [  ]   Home w/ PT  [ X ]  Rehab  [  ]    Discussed with Cardiothoracic Team at AM rounds. VITAL SIGNS    Subjective: Patient states: "I'm feeling alright." Denies CP, palpitation, SOB, LOBATO, N/V/D, visual disturbance, HA, dizziness, fever, or chills.  Episode of low HR (78) overnight. No acute events noted.      Telemetry: NSR 80-90 (episodic 78 overnight)     Vital Signs Last 24 Hrs  T(C): 36.4 (18 @ 11:34), Max: 36.4 (18 @ 22:00)  T(F): 97.5 (18 @ 11:34), Max: 97.6 (18 @ 05:12)  HR: 102 (18 @ 11:34) (90 - 102)  BP: 91/59 (18 @ 11:34) (91/59 - 110/75)  RR: 18 (18 @ 11:34) (18 - 18)  SpO2: 100% (18 @ 11:34) (98% - 100%)            @ 07:01  -   @ 07:00  --------------------------------------------------------  IN: 1195.7 mL / OUT: 1150 mL / NET: 45.7 mL     @ 07:01  -   @ 12:31  --------------------------------------------------------  IN: 200 mL / OUT: 0 mL / NET: 200 mL    Daily     Daily Weight in k.2 (25 May 2018 12:23)    CAPILLARY BLOOD GLUCOSE  88 (24 May 2018 13:30)  84 (24 May 2018 13:00)    POCT Blood Glucose.: 210 mg/dL (25 May 2018 11:42)  POCT Blood Glucose.: 154 mg/dL (25 May 2018 07:39)  POCT Blood Glucose.: 150 mg/dL (24 May 2018 22:09)  POCT Blood Glucose.: 105 mg/dL (24 May 2018 17:06)  POCT Blood Glucose.: 88 mg/dL (24 May 2018 13:38)  POCT Blood Glucose.: 84 mg/dL (24 May 2018 13:04)                        PHYSICAL EXAM    Neurology: alert and oriented x 3, nonfocal, no gross deficits    CV: (+) S1 and S2, No murmurs, rubs, gallops or clicks     Sternal Wound:  MSI -->CDI , sternum stable    Lungs: CTA B/L     Abdomen: soft, nontender, nondistended, positive bowel sounds, (+) Flatus; (+) BM     :  Voiding               Extremities:  B/L LE (-) edema; negative calf tenderness; (+) 2 DP palpable; Right UE third digit with DSD in place C/D/I     amLODIPine  Tablet 5 milliGRAM(s) Oral daily  aspirin enteric coated 81 milliGRAM(s) Oral daily  atorvastatin 10 milliGRAM(s) Oral at bedtime  atovaquone Suspension 1500 milliGRAM(s) Oral daily  calcium carbonate 1250 mG + Vitamin D (OsCal 500 + D) 2 Tablet(s) Oral two times a day  diltiazem  milliGRAM(s) Oral daily  enoxaparin Injectable 70 milliGRAM(s) SubCutaneous two times a day  famotidine Tablet 20 milliGRAM(s) Oral two times a day  hydrochlorothiazide 25 milliGRAM(s) Oral daily  insulin glargine Injectable (LANTUS) 13 Unit(s) SubCutaneous at bedtime  insulin lispro (HumaLOG) corrective regimen sliding scale SubCutaneous three times a day before meals  insulin lispro (HumaLOG) corrective regimen sliding scale SubCutaneous at bedtime  insulin lispro Injectable (HumaLOG) 8 Unit(s) SubCutaneous three times a day before meals  magnesium oxide 400 milliGRAM(s) Oral every 12 hours  mavyret 100mg/40mg  tablet 3 Tablet(s) 3 Tablet(s) Oral daily  multivitamin 1 Tablet(s) Oral daily  mycophenolate mofetil 1000 milliGRAM(s) Oral two times a day  nystatin  Suspension 676709 Unit(s) Oral every 6 hours  predniSONE Tablet 7.5 milliGRAM(s) Oral two times a day  silver sulfADIAZINE 1% Cream 1 Application(s) Topical two times a day  sodium bicarbonate 1300 milliGRAM(s) Oral every 8 hours  sodium chloride 0.9% lock flush 3 milliLiter(s) IV Push every 8 hours  tacrolimus 6 milliGRAM(s) Oral two times a day  valGANciclovir 450 milliGRAM(s) Oral two times a day    Physical Therapy Rec:   Home  [  ]   Home w/ PT  [ X ]  Rehab  [  ]    Discussed with Cardiothoracic Team at AM rounds.

## 2018-05-25 NOTE — PROGRESS NOTE ADULT - PROBLEM SELECTOR PLAN 7
HCV donor: Continue Mavyret, Hepatology follow up as scheduled  PCP/Toxoplasmosis prophylaxis: Continue mepron 1500 po daily   CMV prophylaxis: continue valcyte 450 mg bid  Oral Candidiasis: Nystatin 500,000 U oral q 6 hours  CAD prophylaxis:  ASA STATIN   GI PPX: H2 blocker, pepcid daily  DVT PPX: covered by Lovenox BID HCV donor: Continue with Mavyret 100mg/40mg 3 Tablet(s) Oral daily  PCP/Toxoplasmosis prophylaxis: Continue mepron 1500 po daily   CMV prophylaxis: Decrease valcyte 450 mg PO daily   Oral Candidiasis: Nystatin 500,000 U oral q 6 hours  CAD prophylaxis:  ASA STATIN   GI PPX: H2 blocker, pepcid daily  DVT PPX: covered by Lovenox BID

## 2018-05-26 DIAGNOSIS — I95.9 HYPOTENSION, UNSPECIFIED: ICD-10-CM

## 2018-05-26 LAB
-  AMIKACIN: SIGNIFICANT CHANGE UP
-  AMOXICILLIN/CLAVULANIC ACID: SIGNIFICANT CHANGE UP
-  AMPICILLIN/SULBACTAM: SIGNIFICANT CHANGE UP
-  AMPICILLIN: SIGNIFICANT CHANGE UP
-  AZTREONAM: SIGNIFICANT CHANGE UP
-  CEFAZOLIN: SIGNIFICANT CHANGE UP
-  CEFEPIME: SIGNIFICANT CHANGE UP
-  CEFOXITIN: SIGNIFICANT CHANGE UP
-  CEFTRIAXONE: SIGNIFICANT CHANGE UP
-  CIPROFLOXACIN: SIGNIFICANT CHANGE UP
-  ERTAPENEM: SIGNIFICANT CHANGE UP
-  GENTAMICIN: SIGNIFICANT CHANGE UP
-  IMIPENEM: SIGNIFICANT CHANGE UP
-  LEVOFLOXACIN: SIGNIFICANT CHANGE UP
-  MEROPENEM: SIGNIFICANT CHANGE UP
-  NITROFURANTOIN: SIGNIFICANT CHANGE UP
-  PIPERACILLIN/TAZOBACTAM: SIGNIFICANT CHANGE UP
-  TIGECYCLINE: SIGNIFICANT CHANGE UP
-  TOBRAMYCIN: SIGNIFICANT CHANGE UP
-  TRIMETHOPRIM/SULFAMETHOXAZOLE: SIGNIFICANT CHANGE UP
ANION GAP SERPL CALC-SCNC: 13 MMOL/L — SIGNIFICANT CHANGE UP (ref 5–17)
BUN SERPL-MCNC: 27 MG/DL — HIGH (ref 7–23)
CALCIUM SERPL-MCNC: 9.1 MG/DL — SIGNIFICANT CHANGE UP (ref 8.4–10.5)
CHLORIDE SERPL-SCNC: 96 MMOL/L — SIGNIFICANT CHANGE UP (ref 96–108)
CO2 SERPL-SCNC: 25 MMOL/L — SIGNIFICANT CHANGE UP (ref 22–31)
CREAT SERPL-MCNC: 1.2 MG/DL — SIGNIFICANT CHANGE UP (ref 0.5–1.3)
CULTURE RESULTS: SIGNIFICANT CHANGE UP
GLUCOSE BLDC GLUCOMTR-MCNC: 115 MG/DL — HIGH (ref 70–99)
GLUCOSE BLDC GLUCOMTR-MCNC: 123 MG/DL — HIGH (ref 70–99)
GLUCOSE BLDC GLUCOMTR-MCNC: 177 MG/DL — HIGH (ref 70–99)
GLUCOSE BLDC GLUCOMTR-MCNC: 195 MG/DL — HIGH (ref 70–99)
GLUCOSE BLDC GLUCOMTR-MCNC: 212 MG/DL — HIGH (ref 70–99)
GLUCOSE SERPL-MCNC: 195 MG/DL — HIGH (ref 70–99)
HCT VFR BLD CALC: 38.7 % — LOW (ref 39–50)
HGB BLD-MCNC: 12.1 G/DL — LOW (ref 13–17)
MCHC RBC-ENTMCNC: 29 PG — SIGNIFICANT CHANGE UP (ref 27–34)
MCHC RBC-ENTMCNC: 31.3 GM/DL — LOW (ref 32–36)
MCV RBC AUTO: 92.5 FL — SIGNIFICANT CHANGE UP (ref 80–100)
METHOD TYPE: SIGNIFICANT CHANGE UP
ORGANISM # SPEC MICROSCOPIC CNT: SIGNIFICANT CHANGE UP
ORGANISM # SPEC MICROSCOPIC CNT: SIGNIFICANT CHANGE UP
PLATELET # BLD AUTO: 193 K/UL — SIGNIFICANT CHANGE UP (ref 150–400)
POTASSIUM SERPL-MCNC: 3.8 MMOL/L — SIGNIFICANT CHANGE UP (ref 3.5–5.3)
POTASSIUM SERPL-SCNC: 3.8 MMOL/L — SIGNIFICANT CHANGE UP (ref 3.5–5.3)
RBC # BLD: 4.18 M/UL — LOW (ref 4.2–5.8)
RBC # FLD: 19.5 % — HIGH (ref 10.3–14.5)
SODIUM SERPL-SCNC: 134 MMOL/L — LOW (ref 135–145)
SPECIMEN SOURCE: SIGNIFICANT CHANGE UP
TACROLIMUS SERPL-MCNC: 15.4 NG/ML — SIGNIFICANT CHANGE UP
WBC # BLD: 3.1 K/UL — LOW (ref 3.8–10.5)
WBC # FLD AUTO: 3.1 K/UL — LOW (ref 3.8–10.5)

## 2018-05-26 PROCEDURE — 99233 SBSQ HOSP IP/OBS HIGH 50: CPT

## 2018-05-26 PROCEDURE — 99231 SBSQ HOSP IP/OBS SF/LOW 25: CPT | Mod: GC

## 2018-05-26 RX ORDER — POTASSIUM CHLORIDE 20 MEQ
40 PACKET (EA) ORAL ONCE
Qty: 0 | Refills: 0 | Status: COMPLETED | OUTPATIENT
Start: 2018-05-26 | End: 2018-05-26

## 2018-05-26 RX ADMIN — SODIUM CHLORIDE 3 MILLILITER(S): 9 INJECTION INTRAMUSCULAR; INTRAVENOUS; SUBCUTANEOUS at 06:07

## 2018-05-26 RX ADMIN — Medication 1 APPLICATION(S): at 16:59

## 2018-05-26 RX ADMIN — ATORVASTATIN CALCIUM 10 MILLIGRAM(S): 80 TABLET, FILM COATED ORAL at 21:47

## 2018-05-26 RX ADMIN — Medication 1300 MILLIGRAM(S): at 05:59

## 2018-05-26 RX ADMIN — MAGNESIUM OXIDE 400 MG ORAL TABLET 400 MILLIGRAM(S): 241.3 TABLET ORAL at 05:59

## 2018-05-26 RX ADMIN — Medication 1 APPLICATION(S): at 05:58

## 2018-05-26 RX ADMIN — MAGNESIUM OXIDE 400 MG ORAL TABLET 400 MILLIGRAM(S): 241.3 TABLET ORAL at 16:53

## 2018-05-26 RX ADMIN — SODIUM CHLORIDE 3 MILLILITER(S): 9 INJECTION INTRAMUSCULAR; INTRAVENOUS; SUBCUTANEOUS at 21:48

## 2018-05-26 RX ADMIN — Medication 500000 UNIT(S): at 17:54

## 2018-05-26 RX ADMIN — MYCOPHENOLATE MOFETIL 750 MILLIGRAM(S): 250 CAPSULE ORAL at 08:19

## 2018-05-26 RX ADMIN — Medication 81 MILLIGRAM(S): at 11:58

## 2018-05-26 RX ADMIN — VALGANCICLOVIR 450 MILLIGRAM(S): 450 TABLET, FILM COATED ORAL at 12:04

## 2018-05-26 RX ADMIN — Medication 2: at 08:18

## 2018-05-26 RX ADMIN — Medication 8 UNIT(S): at 16:51

## 2018-05-26 RX ADMIN — Medication 8 UNIT(S): at 08:18

## 2018-05-26 RX ADMIN — ATOVAQUONE 1500 MILLIGRAM(S): 750 SUSPENSION ORAL at 11:58

## 2018-05-26 RX ADMIN — SODIUM CHLORIDE 3 MILLILITER(S): 9 INJECTION INTRAMUSCULAR; INTRAVENOUS; SUBCUTANEOUS at 13:12

## 2018-05-26 RX ADMIN — MYCOPHENOLATE MOFETIL 750 MILLIGRAM(S): 250 CAPSULE ORAL at 20:00

## 2018-05-26 RX ADMIN — Medication 1 TABLET(S): at 11:59

## 2018-05-26 RX ADMIN — ENOXAPARIN SODIUM 70 MILLIGRAM(S): 100 INJECTION SUBCUTANEOUS at 16:58

## 2018-05-26 RX ADMIN — Medication 2 TABLET(S): at 05:59

## 2018-05-26 RX ADMIN — Medication 4: at 16:51

## 2018-05-26 RX ADMIN — Medication 8 UNIT(S): at 11:59

## 2018-05-26 RX ADMIN — INSULIN GLARGINE 13 UNIT(S): 100 INJECTION, SOLUTION SUBCUTANEOUS at 21:47

## 2018-05-26 RX ADMIN — Medication 25 MILLIGRAM(S): at 05:59

## 2018-05-26 RX ADMIN — Medication 500000 UNIT(S): at 11:58

## 2018-05-26 RX ADMIN — FAMOTIDINE 20 MILLIGRAM(S): 10 INJECTION INTRAVENOUS at 06:00

## 2018-05-26 RX ADMIN — Medication 500000 UNIT(S): at 05:58

## 2018-05-26 RX ADMIN — Medication 1300 MILLIGRAM(S): at 21:47

## 2018-05-26 RX ADMIN — TACROLIMUS 6 MILLIGRAM(S): 5 CAPSULE ORAL at 20:00

## 2018-05-26 RX ADMIN — TACROLIMUS 6 MILLIGRAM(S): 5 CAPSULE ORAL at 08:18

## 2018-05-26 RX ADMIN — Medication 40 MILLIEQUIVALENT(S): at 16:52

## 2018-05-26 RX ADMIN — FAMOTIDINE 20 MILLIGRAM(S): 10 INJECTION INTRAVENOUS at 16:55

## 2018-05-26 RX ADMIN — Medication 2 TABLET(S): at 16:55

## 2018-05-26 RX ADMIN — ENOXAPARIN SODIUM 70 MILLIGRAM(S): 100 INJECTION SUBCUTANEOUS at 05:58

## 2018-05-26 RX ADMIN — Medication 7.5 MILLIGRAM(S): at 05:59

## 2018-05-26 RX ADMIN — Medication 7.5 MILLIGRAM(S): at 16:53

## 2018-05-26 RX ADMIN — Medication 1300 MILLIGRAM(S): at 16:52

## 2018-05-26 NOTE — PROGRESS NOTE ADULT - ASSESSMENT
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 Now presenting with hyperglycemia likely 2/2 to steroids vs. prograf.     Please call me at [phone] for any further questions up till 5 pm. For after hours, please call the covering cardiology on call fellow at [phone] for any further assistance.    #Hyperglycemia  - Now on basal bolus. C/w endocrine recs  - Nutrition and Dietary education  - Insulin injection education    #Immunosuppresion  Tacro level improved today.   - c/w Cellcept 1000mg BID, Prednisone 7.5mg BID, with decreased dose of prograf to 5mg bid    #PPX  - Valcyte 450mg BID, Mepron, Nystatin     #DVT  - Lovenox 80mg BID    #HTN  - Will reduce Cardizem to 120mg, c/w hCTZ and Norvasc

## 2018-05-26 NOTE — PROGRESS NOTE ADULT - ASSESSMENT
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 Now presenting with hyperglycemia, glucose initally >700,  likely 2/2 to steroids vs. prograf.     Hospital course:  5/24: Weaned off Insulin gtt transitioned to basal bolus insulin.  Glucometer/Insulin teaching initiated  5/25: Overnight: HR with low noted to be 75-80 with sleep    5/26: Cardizem decreased to 180, norvasc discontinued

## 2018-05-26 NOTE — PROGRESS NOTE ADULT - SUBJECTIVE AND OBJECTIVE BOX
VITAL SIGNS    Subjective: Patient states: "I'm feeling great." Denies CP, palpitation, SOB, LOBATO, HA, dizziness, N/V/D, fever or chills.  No acute events noted overnight.     Telemetry: NSR 80-90     Vital Signs Last 24 Hrs  T(C): 36.4 (18 @ 11:04), Max: 36.8 (18 @ 19:04)  T(F): 97.6 (18 @ 11:04), Max: 98.2 (18 @ 19:04)  HR: 103 (18 @ 11:04) (95 - 103)  BP: 105/72 (18 @ 11:04) (92/63 - 119/80)  RR: 18 (18 @ 11:04) (18 - 18)  SpO2: 98% (18 @ 11:04) (98% - 99%)            07:01  -   @ 07:00  --------------------------------------------------------  IN: 920 mL / OUT: 1275 mL / NET: -355 mL     @ 07:01  -   @ 15:07  --------------------------------------------------------  IN: 140 mL / OUT: 400 mL / NET: -260 mL    Daily     Daily Weight in k.7 (26 May 2018 10:38)    CAPILLARY BLOOD GLUCOSE    POCT Blood Glucose.: 177 mg/dL (26 May 2018 11:51)  POCT Blood Glucose.: 195 mg/dL (26 May 2018 07:22)  POCT Blood Glucose.: 169 mg/dL (25 May 2018 21:49)  POCT Blood Glucose.: 115 mg/dL (25 May 2018 17:27)        PHYSICAL EXAM    Neurology: alert and oriented x 3, nonfocal, no gross deficits    CV: (+) S1 and S2, No murmurs, rubs, gallops or clicks     Sternal Wound:  MSI -->healed , sternum stable    Lungs: CTA B/L     Abdomen: soft, nontender, nondistended, positive bowel sounds, (+) Flatus; (+) BM     :  Voiding               Extremities:  B/L LE (-) edema; negative calf tenderness; (+) 2 DP palpable.  Right upper extremity thrid digit proximal tip necrotic with DSD in place, C/D/I       aspirin enteric coated 81 milliGRAM(s) Oral daily  atorvastatin 10 milliGRAM(s) Oral at bedtime  atovaquone Suspension 1500 milliGRAM(s) Oral daily  calcium carbonate 1250 mG + Vitamin D (OsCal 500 + D) 2 Tablet(s) Oral two times a day  diltiazem  milliGRAM(s) Oral daily  enoxaparin Injectable 70 milliGRAM(s) SubCutaneous two times a day  famotidine Tablet 20 milliGRAM(s) Oral two times a day  hydrochlorothiazide 25 milliGRAM(s) Oral daily  insulin glargine Injectable (LANTUS) 13 Unit(s) SubCutaneous at bedtime  insulin lispro (HumaLOG) corrective regimen sliding scale   SubCutaneous three times a day before meals  insulin lispro (HumaLOG) corrective regimen sliding scale   SubCutaneous at bedtime  insulin lispro Injectable (HumaLOG) 8 Unit(s) SubCutaneous three times a day before meals  magnesium oxide 400 milliGRAM(s) Oral every 12 hours  mavyret 100mg/40mg  tablet 3 Tablet(s) 3 Tablet(s) Oral daily  multivitamin 1 Tablet(s) Oral daily  mycophenolate mofetil 750 milliGRAM(s) Oral two times a day  nystatin Suspension 427652 Unit(s) Oral every 6 hours  predniSONE   Tablet 7.5 milliGRAM(s) Oral two times a day  silver sulfADIAZINE 1% Cream 1 Application(s) Topical two times a day  sodium bicarbonate 1300 milliGRAM(s) Oral every 8 hours  sodium chloride 0.9% lock flush 3 milliLiter(s) IV Push every 8 hours  tacrolimus 6 milliGRAM(s) Oral two times a day  valGANciclovir 450 milliGRAM(s) Oral daily    Physical Therapy Rec:   Home  [  ]   Home w/ PT  [ X ]  Rehab  [  ]    Discussed with Cardiothoracic Team at AM rounds.

## 2018-05-26 NOTE — PROGRESS NOTE ADULT - ATTENDING COMMENTS
no events. slowly learning to admin insuling with insulin pen  aspirin enteric coated 81 milliGRAM(s) Oral daily  atorvastatin 10 milliGRAM(s) Oral at bedtime  atovaquone Suspension 1500 milliGRAM(s) Oral daily  diltiazem    milliGRAM(s) Oral daily  enoxaparin Injectable 70 milliGRAM(s) SubCutaneous two times a day  hydrochlorothiazide 25 milliGRAM(s) Oral daily  mycophenolate mofetil 750 milliGRAM(s) Oral two times a day  nystatin    Suspension 023041 Unit(s) Oral every 6 hours  predniSONE   Tablet 7.5 milliGRAM(s) Oral two times a day  tacrolimus 6 milliGRAM(s) Oral two times a day  valGANciclovir 450 milliGRAM(s) Oral daily  T(C): 36.5 (05-26-18 @ 07:45), Max: 36.8 (05-25-18 @ 19:04)  HR: 96 (05-26-18 @ 07:45) (95 - 102)  BP: 115/81 (05-26-18 @ 07:45) (91/59 - 119/80)  BP(mean): 95 (05-25-18 @ 23:34) (71 - 95)  05-26    134<L>  |  96  |  27<H>  ----------------------------<  195<H>  3.8   |  25  |  1.20    Ca    9.1      26 May 2018 07:39    TPro  6.4  /  Alb  3.6  /  TBili  0.8  /  DBili  x   /  AST  29  /  ALT  19  /  AlkPhos  58  05-25                        12.1   3.1   )-----------( 193      ( 26 May 2018 07:39 )             38.7   No change in prograf  D/c HZTD, replete K.   Follow WBC, NO change in cellcept/valcyte  Follow CMV PCR.  continue diabetic teaching, d/c next week. no events. slowly learning to admin insuling with insulin pen  no further GI symptoms  aspirin enteric coated 81 milliGRAM(s) Oral daily  atorvastatin 10 milliGRAM(s) Oral at bedtime  atovaquone Suspension 1500 milliGRAM(s) Oral daily  diltiazem    milliGRAM(s) Oral daily  enoxaparin Injectable 70 milliGRAM(s) SubCutaneous two times a day  hydrochlorothiazide 25 milliGRAM(s) Oral daily  mycophenolate mofetil 750 milliGRAM(s) Oral two times a day  nystatin    Suspension 422740 Unit(s) Oral every 6 hours  predniSONE   Tablet 7.5 milliGRAM(s) Oral two times a day  tacrolimus 6 milliGRAM(s) Oral two times a day  valGANciclovir 450 milliGRAM(s) Oral daily  T(C): 36.5 (05-26-18 @ 07:45), Max: 36.8 (05-25-18 @ 19:04)  HR: 96 (05-26-18 @ 07:45) (95 - 102)  BP: 115/81 (05-26-18 @ 07:45) (91/59 - 119/80)  BP(mean): 95 (05-25-18 @ 23:34) (71 - 95)  05-26    134<L>  |  96  |  27<H>  ----------------------------<  195<H>  3.8   |  25  |  1.20    Ca    9.1      26 May 2018 07:39    TPro  6.4  /  Alb  3.6  /  TBili  0.8  /  DBili  x   /  AST  29  /  ALT  19  /  AlkPhos  58  05-25                        12.1   3.1   )-----------( 193      ( 26 May 2018 07:39 )             38.7   Plan:  prograf  level falling, No change in prograf  D/c HZTD, replete K.   Follow WBC, No change in cellcept/valcyte  Follow CMV PCR.  continue diabetic teaching, d/c next week.

## 2018-05-26 NOTE — PROGRESS NOTE ADULT - PROBLEM SELECTOR PLAN 8
1) DC planning to home with home care and supervision at home with insulin therapy and glucose monitoring.  2) Requires re-demonstration of ability to administer insulin prior to discharge with documented teach-back  3) Endocrine clinic follow up to be scheduled upon discharge
D/C planning to home with home care and supervision at home with insulin therapy and glucose monitoring.  Requires re-demonstration of ability to administer insulin prior to discharge with documented teach-back  Endocrine clinic follow up to be scheduled upon discharge
D/C planning to home with home care and supervision at home with insulin therapy and glucose monitoring.  Requires re-demonstration of ability to administer insulin prior to discharge with documented teach-back  Endocrine clinic follow up to be scheduled upon discharge
1) DC planning to home with home care and supervision at home with insulin therapy and glucose monitoring.  2) Requires re-demonstration of ability to administer insulin prior to discharge with documented teach-back  3) Endocrine clinic follow up to be scheduled upon discharge

## 2018-05-26 NOTE — PROGRESS NOTE ADULT - ASSESSMENT
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 Now presenting with hyperglycemia, glucose initally >700,  likely 2/2 to steroids vs. prograf.   Hospital course:  5/24: Weaned off Insulin gtt transitioned to basal bolus insulin. Glucometer/Insulin teaching initiated  5/25: Overnight: HR with low noted to be 75-80 with sleep; Hypotensive with SPB 91; Decrease Cardizem to 180 mg daily; Prograft level 17.5; Mild neutropenia WBC 3.4 Decrease valganciclovir 450 mg po daily and Cellcept to 750 mg PO BID. Maintain Tacrolimus 6 mg PO BID; Continue with Diabetic teaching. Endo following for hyperglycemia; Lantus increased to 13 units HA and pre meal increased to 8 units TID.  5/26 VVS; Continue with current medication regimen.  Monitor leukopenia WBC 3.1.  Awaiting CMV titer and HCV viral load. 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 Now presenting with hyperglycemia, glucose initally >700,  likely 2/2 to steroids vs. prograf.   Hospital course:  5/24: Weaned off Insulin gtt transitioned to basal bolus insulin. Glucometer/Insulin teaching initiated  5/25: Overnight: HR with low noted to be 75-80 with sleep; Hypotensive with SPB 91; Decrease Cardizem to 180 mg daily; Prograft level 17.5; Mild neutropenia WBC 3.4 Decrease valganciclovir 450 mg po daily and Cellcept to 750 mg PO BID. Maintain Tacrolimus 6 mg PO BID; Continue with Diabetic teaching. Endo following for hyperglycemia; Lantus increased to 13 units HA and pre meal increased to 8 units TID.  5/26 VVS; Continue with current medication regimen.  Monitor leukopenia WBC 3.1.  Awaiting CMV titer and HCV viral load. Hypotension SBP 90's, Norvasc D/C'd. 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 Now presenting with hyperglycemia, glucose initally >700,  likely 2/2 to steroids vs. prograf.   Hospital course:  5/24: Weaned off Insulin gtt transitioned to basal bolus insulin. Glucometer/Insulin teaching initiated  5/25: Overnight: HR with low noted to be 75-80 with sleep; Hypotensive with SPB 91; Decrease Cardizem to 180 mg daily; Prograft level 17.5; Mild neutropenia WBC 3.4 Decrease valganciclovir 450 mg po daily and Cellcept to 750 mg PO BID. Maintain Tacrolimus 6 mg PO BID; Continue with Diabetic teaching. Endo following for hyperglycemia; Lantus increased to 13 units HA and pre meal increased to 8 units TID.  5/26 VVS; Continue with current medication regimen.  Monitor leukopenia WBC 3.1.  Awaiting CMV titer and HCV viral load. Hypotension SBP 90's, Norvasc and HCTZ D/C'd. Urine culture with Klebsiella oxytoca; patient asymptomatic per ID no abx warranted at this time--> continue to monitor.  Progaf level 15; continue with current dose. Supplement potassium 40 meq PO x 1 for K+ 3.8.    Disposition: Home once medically cleared

## 2018-05-26 NOTE — PROGRESS NOTE ADULT - SUBJECTIVE AND OBJECTIVE BOX
VITAL SIGNS  T(F): 97.9  HR: 93  BP: 112/78  RR: 18  SpO2: 97%    05-25-18 @ 07:01  -  05-26-18 @ 07:00  --------------------------------------------------------  OUT: 1275 mL / NET: -1275 mL    LAB             5/26: pending  Tacro FK level: 17.5 on 5/25  pending, to be drawn at 0715    CAPILLARY BLOOD GLUCOSE  195  169  115  210  154    DIAGNOSTICS  Xray Chest 1 View- PORTABLE-Urgent (05.23.18 @ 20:32)   Status post median sternotomy. Surgical clips overlying the mediastinum   and right upper chest.  Right lower lung patchy opacities, some of which partially obscure the   right heart border.The left lung is clear.  There is no pleural effusion.  There is no pneumothorax.  There is no acute abnormality of the visualized osseous structures.  IMPRESSION:  Right lower lung patchy opacities, some of which partially obscure the   right heart are likely due to atelectasis.    MEDICATIONS  asa 81  Lovenox 70 BID  hydrochlorthiazide 25 daily  atorvastatin 10 milliGRAM(s) Oral at bedtime  atovaquone Suspension 1500 milliGRAM(s) Oral daily  diltiazem    milliGRAM(s) Oral daily  famotidine    Tablet 20 milliGRAM(s) Oral two times a day  insulin glargine Injectable (LANTUS) 13 Unit(s) SubCutaneous at bedtime  insulin lispro (HumaLOG) corrective regimen sliding scale   SubCutaneous three times a day before meals  insulin lispro (HumaLOG) corrective regimen sliding scale   SubCutaneous at bedtime  insulin lispro Injectable (HumaLOG) 8 Unit(s) SubCutaneous three times a day before meals  nystatin    Suspension 804509 Unit(s) Oral every 6 hours  predniSONE   Tablet 7.5 milliGRAM(s) Oral two times a day  valGANciclovir 450 milliGRAM(s) Oral daily      PHYSICAL EXAM  GEN: No apparent distress, examined in bed  NEURO: Non-focal,  A+Ox 3  CV: S1 S2 without rub or murmur  PULMONARY: clear  ABDOMEN:  Soft, non-tender, non-distended, bowel sounds active x 4  LBM: 5/25  : voiding   VASCULAR: +pp +radial  SKIN: Warm, dry, intact    MUSCULOSKELETAL:  Moves all extremities equally  PHYSICAL THERAPY REC:  Home [ x ]  Home w PT [   ]   JP [   ]

## 2018-05-26 NOTE — PROGRESS NOTE ADULT - SUBJECTIVE AND OBJECTIVE BOX
Interval History:    Medications:  aspirin enteric coated 81 milliGRAM(s) Oral daily  atorvastatin 10 milliGRAM(s) Oral at bedtime  atovaquone Suspension 1500 milliGRAM(s) Oral daily  calcium carbonate 1250 mG + Vitamin D (OsCal 500 + D) 2 Tablet(s) Oral two times a day  diltiazem    milliGRAM(s) Oral daily  enoxaparin Injectable 70 milliGRAM(s) SubCutaneous two times a day  famotidine    Tablet 20 milliGRAM(s) Oral two times a day  hydrochlorothiazide 25 milliGRAM(s) Oral daily  insulin glargine Injectable (LANTUS) 13 Unit(s) SubCutaneous at bedtime  insulin lispro (HumaLOG) corrective regimen sliding scale   SubCutaneous three times a day before meals  insulin lispro (HumaLOG) corrective regimen sliding scale   SubCutaneous at bedtime  insulin lispro Injectable (HumaLOG) 8 Unit(s) SubCutaneous three times a day before meals  magnesium oxide 400 milliGRAM(s) Oral every 12 hours  mavyret 100mg/40mg  tablet 3 Tablet(s) 3 Tablet(s) Oral daily  multivitamin 1 Tablet(s) Oral daily  mycophenolate mofetil 750 milliGRAM(s) Oral two times a day  nystatin    Suspension 611996 Unit(s) Oral every 6 hours  predniSONE   Tablet 7.5 milliGRAM(s) Oral two times a day  silver sulfADIAZINE 1% Cream 1 Application(s) Topical two times a day  sodium bicarbonate 1300 milliGRAM(s) Oral every 8 hours  sodium chloride 0.9% lock flush 3 milliLiter(s) IV Push every 8 hours  tacrolimus 6 milliGRAM(s) Oral two times a day  valGANciclovir 450 milliGRAM(s) Oral daily    Vitals:  T(C): 36.5 (18 @ 07:45), Max: 36.8 (18 @ 19:04)  HR: 96 (18 @ 07:45) (95 - 102)  BP: 115/81 (18 @ 07:45) (91/59 - 119/80)  BP(mean): 95 (18 @ 23:34) (71 - 95)  ABP: --  ABP(mean): --  RR: 18 (18 @ 23:34) (18 - 18)  SpO2: 98% (18 @ 23:34) (98% - 100%)  Wt(kg): --  CVP(cm H2O): --  CO: --  CI: --  PA: --  PA(mean): --  PCWP: --  SVR: --  PVR: --    Daily     Daily Weight in k.2 (25 May 2018 12:23)        I&O's Summary    25 May 2018 07:01  -  26 May 2018 07:00  --------------------------------------------------------  IN: 920 mL / OUT: 1275 mL / NET: -355 mL        Physical Exam:  Appearance: No Acute Distress  HEENT: No JVD  Cardiovascular: Normal S1 S2, No murmurs/rubs/gallops  Respiratory: Clear to auscultation bilaterally  Gastrointestinal: Soft, Non-tender	  Skin: No cyanosis	  Neurologic: Non-focal  Extremities: No LE edema  Psychiatry: A & O x 3, Mood & affect appropriate    LVAD Interrogation:  Pump Flow:  Pump Speed:  Pulse Index:  Pump Power:  VAD Events:   Driveline evaluation:    Programming Changes: No changes made    Labs:                        12.1   3.1   )-----------( 193      ( 26 May 2018 07:39 )             38.7         134<L>  |  96  |  27<H>  ----------------------------<  195<H>  3.8   |  25  |  1.20    Ca    9.1      26 May 2018 07:39    TPro  6.4  /  Alb  3.6  /  TBili  0.8  /  DBili  x   /  AST  29  /  ALT  19  /  AlkPhos  58                        TELEMETRY:    [ ] Echocardiogram: Interval History: No acute event overnight.     Medications:  aspirin enteric coated 81 milliGRAM(s) Oral daily  atorvastatin 10 milliGRAM(s) Oral at bedtime  atovaquone Suspension 1500 milliGRAM(s) Oral daily  calcium carbonate 1250 mG + Vitamin D (OsCal 500 + D) 2 Tablet(s) Oral two times a day  diltiazem    milliGRAM(s) Oral daily  enoxaparin Injectable 70 milliGRAM(s) SubCutaneous two times a day  famotidine    Tablet 20 milliGRAM(s) Oral two times a day  hydrochlorothiazide 25 milliGRAM(s) Oral daily  insulin glargine Injectable (LANTUS) 13 Unit(s) SubCutaneous at bedtime  insulin lispro (HumaLOG) corrective regimen sliding scale   SubCutaneous three times a day before meals  insulin lispro (HumaLOG) corrective regimen sliding scale   SubCutaneous at bedtime  insulin lispro Injectable (HumaLOG) 8 Unit(s) SubCutaneous three times a day before meals  magnesium oxide 400 milliGRAM(s) Oral every 12 hours  mavyret 100mg/40mg  tablet 3 Tablet(s) 3 Tablet(s) Oral daily  multivitamin 1 Tablet(s) Oral daily  mycophenolate mofetil 750 milliGRAM(s) Oral two times a day  nystatin    Suspension 535279 Unit(s) Oral every 6 hours  predniSONE   Tablet 7.5 milliGRAM(s) Oral two times a day  silver sulfADIAZINE 1% Cream 1 Application(s) Topical two times a day  sodium bicarbonate 1300 milliGRAM(s) Oral every 8 hours  sodium chloride 0.9% lock flush 3 milliLiter(s) IV Push every 8 hours  tacrolimus 6 milliGRAM(s) Oral two times a day  valGANciclovir 450 milliGRAM(s) Oral daily    Vitals:  T(C): 36.5 (18 @ 07:45), Max: 36.8 (18 @ 19:04)  HR: 96 (18 @ 07:45) (95 - 102)  BP: 115/81 (18 @ 07:45) (91/59 - 119/80)  BP(mean): 95 (18 @ 23:34) (71 - 95)  ABP: --  ABP(mean): --  RR: 18 (18 @ 23:34) (18 - 18)  SpO2: 98% (18 @ 23:34) (98% - 100%)  Wt(kg): --  CVP(cm H2O): --  CO: --  CI: --  PA: --  PA(mean): --  PCWP: --  SVR: --  PVR: --    Daily     Daily Weight in k.2 (25 May 2018 12:23)        I&O's Summary    25 May 2018 07:01  -  26 May 2018 07:00  --------------------------------------------------------  IN: 920 mL / OUT: 1275 mL / NET: -355 mL        Physical Exam:  Appearance: No Acute Distress  HEENT: No JVD  Cardiovascular: Normal S1 S2, No murmurs/rubs/gallops  Respiratory: Clear to auscultation bilaterally  Gastrointestinal: Soft, Non-tender	  Skin: No cyanosis	  Neurologic: Non-focal  Extremities: No LE edema  Psychiatry: A & O x 3, Mood & affect appropriate    Labs:                        12.1   3.1   )-----------( 193      ( 26 May 2018 07:39 )             38.7         134<L>  |  96  |  27<H>  ----------------------------<  195<H>  3.8   |  25  |  1.20    Ca    9.1      26 May 2018 07:39    TPro  6.4  /  Alb  3.6  /  TBili  0.8  /  DBili  x   /  AST  29  /  ALT  19  /  AlkPhos  58      TELEMETRY:    [ ] Echocardiogram: sinus 60-80

## 2018-05-26 NOTE — PROGRESS NOTE ADULT - ASSESSMENT
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 Admitted 5/23/18 with marked medication-induced hyperglycemia, now improved.   Immune compromised but clinically well, no fevers, does not seem to have been provoked by an infection. Cultures in lab. Acquired HCV from donor, on treatment with Mavyret. CMV viral load negative 4/19, on Valgancyclovir.     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 graft rejection requiring plasmapheresis in April ,4/19-4/26/18. Admitted 5/23/18 with marked medication-induced hyperglycemia, now much improved blood sugars on insulin. Immune compromised but clinically well, no fevers, does not seem to have been provoked by an infection. Cultures in lab. Acquired HCV from donor, treated with Mavyret. CMV viral load negative 4/19, on Valgancyclovir.     Recommend  -no additional antibiotics   -continue Mepron prophylaxis and Valganciclovir at current doses  -f/u blood and urine cultures in lab   -fu CMV viral load, HCV viral load  -consider hand surgery eval for necrotic r middle finger  -monitor CBC, now with mild leukopenia ? 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 graft rejection requiring plasmapheresis in April ,4/19-4/26/18. Admitted 5/23/18 with marked medication-induced hyperglycemia, now much improved blood sugars on insulin. Immune compromised but clinically well, no fevers, does not seem to have been provoked by an infection. Cultures in lab. Acquired HCV from donor, treated with Mavyret. CMV viral load negative 4/19, on Valgancyclovir prophylaxis. Blood culture is negative, urine culture with Kleb oxytoca    Recommend  -no additional antibiotics   -continue Mepron prophylaxis and Valganciclovir at current doses  -f/u blood and urine cultures in lab   -fu CMV viral load, HCV viral load  -consider hand surgery eval for necrotic r middle finger  -monitor CBC, now with mild leukopenia ? 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 graft rejection requiring plasmapheresis in April ,4/19-4/26/18. Admitted 5/23/18 with marked medication-induced hyperglycemia, now much improved blood sugars on insulin. Immune compromised but clinically well, no fevers, does not seem to have been provoked by an infection. Cultures in lab. Acquired HCV from donor, on treatment with Mavyret. CMV viral load negative 4/19, on Valgancyclovir prophylaxis. Blood culture is negative, urine culture with Kleb oxytoca    Recommend  -continue Descovy/Tivicay for HIV and Mavyret for Hep C  -no additional antibiotics   -continue Mepron prophylaxis and Valganciclovir at current doses  -f/u blood and urine cultures in lab   -fu CMV viral load, HCV viral load  -consider hand surgery eval for necrotic r middle finger  -monitor CBC, now with mild leukopenia ? 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 graft rejection requiring plasmapheresis in April ,4/19-4/26/18. Admitted 5/23/18 with marked medication-induced hyperglycemia, now much improved blood sugars on insulin. Immune compromised but clinically well, no fevers, does not seem to have been provoked by an infection. Cultures in lab. Acquired HCV from donor, on treatment with Mavyret. CMV viral load negative 4/19, on Valgancyclovir prophylaxis. Blood culture is negative, urine culture with Kleb oxytoca    Recommend  -continue Mavyret for Hep C  -no additional antibiotics   -continue Mepron prophylaxis and Valganciclovir at current doses  -f/u blood and urine cultures in lab   -fu CMV viral load, HCV viral load  -consider hand surgery eval for necrotic r middle finger  -monitor CBC, now with mild leukopenia ? related to Valcyte  -urine culture with Klebsiella, pt w/o symptoms likely represents asymptomatic bacteriuria, hence wouldn't not treat

## 2018-05-26 NOTE — PROGRESS NOTE ADULT - PROBLEM SELECTOR PLAN 7
D/C planning to home with home care and supervision at home with insulin therapy and glucose monitoring.  Requires re-demonstration of ability to administer insulin prior to discharge with documented teach-back  Endocrine clinic follow up to be scheduled upon discharge Goal SBP: 110/70  D/C HCTZ 25 po daily  D/C Norvasc 5 mg PO daily   Continue with Cardizem 180 mg PO daily

## 2018-05-26 NOTE — PROGRESS NOTE ADULT - PROBLEM SELECTOR PLAN 1
-Basal bolus dosing increased with Lantus 13 units q hs and Humalog 8 with meals   -Goal blood glucose 100-140  -Ck CMV titer and HCV viral load Sent STAT.  Blood cultures pending.  -Glucose monitoring AC and HS. Patient and family glucometer teaching implemented.    -Insulin administration instruction to be initiated with brother and patient.   Reviewed use of insulin pen, requires prompting for correct administration.  -Consistent Carb diet with nutrition follow up for dietary teaching  - Endo following

## 2018-05-26 NOTE — PROGRESS NOTE ADULT - PROBLEM SELECTOR PLAN 5
Goal SBP: 110/70  Continue with HCTZ 25 po daily  Continue with Cardizem 180 mg PO daily HCV donor: Continue with Mavyret 100mg/40mg 3 Tablet(s) Oral daily  PCP/Toxoplasmosis prophylaxis: Continue mepron 1500 po daily   CMV prophylaxis: Decrease valcyte 450 mg PO daily   Oral Candidiasis: Nystatin 500,000 U oral q 6 hours  CAD prophylaxis:  ASA STATIN   GI PPX: H2 blocker, pepcid daily  DVT PPX: covered by Lovenox BID

## 2018-05-26 NOTE — PROGRESS NOTE ADULT - PROBLEM SELECTOR PLAN 2
CK FK level   Prograf 6mg bid   Continue with Cellcept to 750 mg PO bid.  Continue with prednisone 7.5 mg po BID

## 2018-05-26 NOTE — PROGRESS NOTE ADULT - SUBJECTIVE AND OBJECTIVE BOX
Follow Up:  Heart transplant     Interval History: No further hyperglycemic events. Feels well, slept well. No pain. No fevers or chills. Valcyte decreased to 450 once a day    ROS:    All other systems negative  Constitutional: no fever, no chills  HEENT:  no sore throat, no rhinorrhea  Cardiovascular:  no chest pain  Respiratory:  no SOB, no cough  GI:  a little loose stools yesterday and this morning, not watery. no abd pain, no vomiting  urinary: no dysuria  musculoskeletal:  no joint pain, no joint swelling    Allergies  No Known Allergies    ANTIMICROBIALS:    atovaquone Suspension 1500 daily  nystatin    Suspension 059567 every 6 hours  valGANciclovir 450 daily    OTHER MEDS:  amLODIPine   Tablet 5 milliGRAM(s) Oral daily  aspirin enteric coated 81 milliGRAM(s) Oral daily  atorvastatin 10 milliGRAM(s) Oral at bedtime  calcium carbonate 1250 mG + Vitamin D (OsCal 500 + D) 2 Tablet(s) Oral two times a day  diltiazem    milliGRAM(s) Oral daily  enoxaparin Injectable 80 milliGRAM(s) SubCutaneous two times a day  famotidine    Tablet 20 milliGRAM(s) Oral two times a day  hydrochlorothiazide 25 milliGRAM(s) Oral daily  insulin glargine Injectable (LANTUS) 12 Unit(s) SubCutaneous at bedtime  insulin lispro (HumaLOG) corrective regimen sliding scale   SubCutaneous three times a day before meals  insulin lispro (HumaLOG) corrective regimen sliding scale   SubCutaneous at bedtime  insulin lispro Injectable (HumaLOG) 6 Unit(s) SubCutaneous three times a day before meals  magnesium oxide 400 milliGRAM(s) Oral every 12 hours  mavyret 100mg/40mg  tablet 3 Tablet(s) 3 Tablet(s) Oral daily  multivitamin 1 Tablet(s) Oral daily  mycophenolate mofetil 1000 milliGRAM(s) Oral two times a day  predniSONE   Tablet 7.5 milliGRAM(s) Oral two times a day  silver sulfADIAZINE 1% Cream 1 Application(s) Topical two times a day  sodium bicarbonate 1300 milliGRAM(s) Oral every 8 hours  sodium chloride 0.9% lock flush 3 milliLiter(s) IV Push every 8 hours  tacrolimus 6 milliGRAM(s) Oral two times a day    Vital Signs Last 24 Hrs  T(C): 36.5 (26 May 2018 07:45), Max: 36.8 (25 May 2018 19:04)  T(F): 97.7 (26 May 2018 07:45), Max: 98.2 (25 May 2018 19:04)  HR: 96 (26 May 2018 07:45) (95 - 102)  BP: 115/81 (26 May 2018 07:45) (91/59 - 119/80)  BP(mean): 95 (25 May 2018 23:34) (71 - 95)  RR: 18 (25 May 2018 23:34) (18 - 18)  SpO2: 98% (25 May 2018 23:34) (98% - 100%)  Physical Exam:  General:    NAD,  non toxic, A&O x 3  HEENT:    no oropharyngeal lesions,   no LAD,   neck supple  Cardiovascular:  regular rate and rhythm   Respiratory:  nonlabored, clear b/l,    no wheezing  abd:   soft,   BS +,   no tenderness,    no organomegaly  :   no  diaz  Musculoskeletal: no joint swelling,   no edema  Skin:    no rash. chest and abdominal scars well healed   Neurologic:     no focal deficit                                     12.1   3.1   )-----------( 193      ( 26 May 2018 07:39 )             38.7                12.2   3.4   )-----------( 210      ( 25 May 2018 07:36 )             38.6     05-26    134<L>  |  96  |  27<H>  ----------------------------<  195<H>  3.8   |  25  |  1.20    Ca    9.1      26 May 2018 07:39        05-25    135  |  97  |  28<H>  ----------------------------<  145<H>  4.1   |  24  |  1.30    Ca    8.9      25 May 2018 07:36    TPro  6.4  /  Alb  3.6  /  TBili  0.8  /  DBili  x   /  AST  29  /  ALT  19  /  AlkPhos  58  05-25      Urinalysis Basic - ( 23 May 2018 20:06 )    Color: Yellow / Appearance: Clear / S.025 / pH: x  Gluc: x / Ketone: Negative  / Bili: Negative / Urobili: Negative mg/dL   Blood: x / Protein: Negative mg/dL / Nitrite: Negative   Leuk Esterase: Negative / RBC: x / WBC x   Sq Epi: x / Non Sq Epi: x / Bacteria: x        MICROBIOLOGY:  Blood and urine cultures in lab    RADIOLOGY:  Xray Chest 1 View- PORTABLE-Urgent (18 @ 20:32)   Right lower lung patchy opacities, some of which partially obscure the   right heart are likely due to atelectasis. Follow Up:  Heart transplant     Interval History: No further hyperglycemic events. Feels well, slept well. No pain. No fevers or chills. Valcyte decreased to 450 once a day. Had diarrhea yesterday 2 episodes, but not today. NO abdominal pain.     ROS:    All other systems negative  Constitutional: no fever, no chills  HEENT:  no sore throat, no rhinorrhea  Cardiovascular:  no chest pain  Respiratory:  no SOB, no cough  GI:  a little loose stools yesterday and this morning, not watery. no abd pain, no vomiting  urinary: no dysuria  musculoskeletal:  no joint pain, no joint swelling    Allergies  No Known Allergies    ANTIMICROBIALS:    atovaquone Suspension 1500 daily  nystatin    Suspension [id number] every 6 hours  valGANciclovir  daily    OTHER MEDS:  amLODIPine   Tablet 5 milliGRAM(s) Oral daily  aspirin enteric coated 81 milliGRAM(s) Oral daily  atorvastatin 10 milliGRAM(s) Oral at bedtime  calcium carbonate 1250 mG + Vitamin D (OsCal 500 + D) 2 Tablet(s) Oral two times a day  diltiazem    milliGRAM(s) Oral daily  enoxaparin Injectable 80 milliGRAM(s) SubCutaneous two times a day  famotidine    Tablet 20 milliGRAM(s) Oral two times a day  hydrochlorothiazide 25 milliGRAM(s) Oral daily  insulin glargine Injectable (LANTUS) 12 Unit(s) SubCutaneous at bedtime  insulin lispro (HumaLOG) corrective regimen sliding scale   SubCutaneous three times a day before meals  insulin lispro (HumaLOG) corrective regimen sliding scale   SubCutaneous at bedtime  insulin lispro Injectable (HumaLOG) 6 Unit(s) SubCutaneous three times a day before meals  magnesium oxide 400 milliGRAM(s) Oral every 12 hours  mavyret 100mg/40mg  tablet 3 Tablet(s) 3 Tablet(s) Oral daily  multivitamin 1 Tablet(s) Oral daily  mycophenolate mofetil 1000 milliGRAM(s) Oral two times a day  predniSONE   Tablet 7.5 milliGRAM(s) Oral two times a day  silver sulfADIAZINE 1% Cream 1 Application(s) Topical two times a day  sodium bicarbonate 1300 milliGRAM(s) Oral every 8 hours  sodium chloride 0.9% lock flush 3 milliLiter(s) IV Push every 8 hours  tacrolimus 6 milliGRAM(s) Oral two times a day    Vital Signs Last 24 Hrs  T(C): 36.5 (26 May 2018 07:45), Max: 36.8 (25 May 2018 19:04)  T(F): 97.7 (26 May 2018 07:45), Max: 98.2 (25 May 2018 19:04)  HR: 96 (26 May 2018 07:45) (95 - 102)  BP: 115/81 (26 May 2018 07:45) (91/59 - 119/80)  BP(mean): 95 (25 May 2018 23:34) (71 - 95)  RR: 18 (25 May 2018 23:34) (18 - 18)  SpO2: 98% (25 May 2018 23:34) (98% - 100%)  Physical Exam:  General:    NAD,  non toxic, A&O x 3  HEENT:    no oropharyngeal lesions,   no LAD,   neck supple  Cardiovascular:  regular rate and rhythm   Respiratory:  nonlabored, clear b/l,    no wheezing  abd:   soft,   BS +,   no tenderness,    no organomegaly  :   no  diaz  Musculoskeletal: no joint swelling,   no edema  Skin:    no rash. chest and abdominal scars well healed   Neurologic:     no focal deficit  Extremities: R middle finger with distal necrosis, no obvious infection                                     12.1   3.1   )-----------( 193      ( 26 May 2018 07:39 )             38.7                12.2   3.4   )-----------( 210      ( 25 May 2018 07:36 )             38.6     05-26    134<L>  |  96  |  27<H>  ----------------------------<  195<H>  3.8   |  25  |  1.20    Ca    9.1      26 May 2018 07:39        05-25    135  |  97  |  28<H>  ----------------------------<  145<H>  4.1   |  24  |  1.30    Ca    8.9      25 May 2018 07:36    TPro  6.4  /  Alb  3.6  /  TBili  0.8  /  DBili  x   /  AST  29  /  ALT  19  /  AlkPhos  58  05-25      Urinalysis Basic - ( 23 May 2018 20:06 )    Color: Yellow / Appearance: Clear / S.025 / pH: x  Gluc: x / Ketone: Negative  / Bili: Negative / Urobili: Negative mg/dL   Blood: x / Protein: Negative mg/dL / Nitrite: Negative   Leuk Esterase: Negative / RBC: x / WBC x   Sq Epi: x / Non Sq Epi: x / Bacteria: x        MICROBIOLOGY:  Blood and urine cultures in lab    RADIOLOGY:  Xray Chest 1 View- PORTABLE-Urgent (18 @ 20:32)   Right lower lung patchy opacities, some of which partially obscure the   right heart are likely due to atelectasis. Follow Up:  Heart transplant     Interval History: No further hyperglycemic events. Feels well, slept well. No pain. No fevers or chills. Valcyte decreased to 450 once a day. Had diarrhea yesterday 2 episodes, but not today. NO abdominal pain.     ROS:    All other systems negative  Constitutional: no fever, no chills  HEENT:  no sore throat, no rhinorrhea  Cardiovascular:  no chest pain  Respiratory:  no SOB, no cough  GI:  a little loose stools yesterday and this morning, not watery. no abd pain, no vomiting  urinary: no dysuria  musculoskeletal:  no joint pain, no joint swelling    Allergies  No Known Allergies    ANTIMICROBIALS:    atovaquone Suspension 1500 daily  nystatin    Suspension 800510 every 6 hours  valGANciclovir  daily    OTHER MEDS:  amLODIPine   Tablet 5 milliGRAM(s) Oral daily  aspirin enteric coated 81 milliGRAM(s) Oral daily  atorvastatin 10 milliGRAM(s) Oral at bedtime  calcium carbonate 1250 mG + Vitamin D (OsCal 500 + D) 2 Tablet(s) Oral two times a day  diltiazem    milliGRAM(s) Oral daily  enoxaparin Injectable 80 milliGRAM(s) SubCutaneous two times a day  famotidine    Tablet 20 milliGRAM(s) Oral two times a day  hydrochlorothiazide 25 milliGRAM(s) Oral daily  insulin glargine Injectable (LANTUS) 12 Unit(s) SubCutaneous at bedtime  insulin lispro (HumaLOG) corrective regimen sliding scale   SubCutaneous three times a day before meals  insulin lispro (HumaLOG) corrective regimen sliding scale   SubCutaneous at bedtime  insulin lispro Injectable (HumaLOG) 6 Unit(s) SubCutaneous three times a day before meals  magnesium oxide 400 milliGRAM(s) Oral every 12 hours  mavyret 100mg/40mg  tablet 3 Tablet(s) 3 Tablet(s) Oral daily  multivitamin 1 Tablet(s) Oral daily  mycophenolate mofetil 1000 milliGRAM(s) Oral two times a day  predniSONE   Tablet 7.5 milliGRAM(s) Oral two times a day  silver sulfADIAZINE 1% Cream 1 Application(s) Topical two times a day  sodium bicarbonate 1300 milliGRAM(s) Oral every 8 hours  sodium chloride 0.9% lock flush 3 milliLiter(s) IV Push every 8 hours  tacrolimus 6 milliGRAM(s) Oral two times a day    Vital Signs Last 24 Hrs  T(C): 36.5 (26 May 2018 07:45), Max: 36.8 (25 May 2018 19:04)  T(F): 97.7 (26 May 2018 07:45), Max: 98.2 (25 May 2018 19:04)  HR: 96 (26 May 2018 07:45) (95 - 102)  BP: 115/81 (26 May 2018 07:45) (91/59 - 119/80)  BP(mean): 95 (25 May 2018 23:34) (71 - 95)  RR: 18 (25 May 2018 23:34) (18 - 18)  SpO2: 98% (25 May 2018 23:34) (98% - 100%)  Physical Exam:  General:    NAD,  non toxic, A&O x 3  HEENT:    no oropharyngeal lesions,   no LAD,   neck supple  Cardiovascular:  regular rate and rhythm   Respiratory:  nonlabored, clear b/l,    no wheezing  abd:   soft,   BS +,   no tenderness,    no organomegaly  :   no  diaz  Musculoskeletal: no joint swelling,   no edema  Skin:    no rash. chest and abdominal scars well healed   Neurologic:     no focal deficit  Extremities: R middle finger with distal necrosis, no obvious infection                                     12.1   3.1   )-----------( 193      ( 26 May 2018 07:39 )             38.7                12.2   3.4   )-----------( 210      ( 25 May 2018 07:36 )             38.6     05-26    134<L>  |  96  |  27<H>  ----------------------------<  195<H>  3.8   |  25  |  1.20    Ca    9.1      26 May 2018 07:39        05-25    135  |  97  |  28<H>  ----------------------------<  145<H>  4.1   |  24  |  1.30    Ca    8.9      25 May 2018 07:36    TPro  6.4  /  Alb  3.6  /  TBili  0.8  /  DBili  x   /  AST  29  /  ALT  19  /  AlkPhos  58  05-25      Urinalysis Basic - ( 23 May 2018 20:06 )    Color: Yellow / Appearance: Clear / S.025 / pH: x  Gluc: x / Ketone: Negative  / Bili: Negative / Urobili: Negative mg/dL   Blood: x / Protein: Negative mg/dL / Nitrite: Negative   Leuk Esterase: Negative / RBC: x / WBC x   Sq Epi: x / Non Sq Epi: x / Bacteria: x        MICROBIOLOGY:  Culture - Urine (18 @ 13:31)    -  Gentamicin: S <=1    -  Tigecycline: S <=1    -  Trimethoprim/Sulfamethoxazole: S <=0.5/9.5    -  Amikacin: S <=8    -  Ampicillin: R >16 These ampicillin results predict results for amoxicillin    -  Cefazolin: S 8    -  Cefoxitin: I 16    -  Ampicillin/Sulbactam: S 8/4    -  Ceftriaxone: S <=1 Enterobacter, Citrobacter, and Serratia may develop resistance during prolonged therapy    -  Cefepime: S <=2    -  Piperacillin/Tazobactam: S <=8    -  Tobramycin: S <=2    -  Amoxicillin/Clavulanic Acid: S <=8/4    -  Levofloxacin: S <=1    -  Meropenem: S <=1    -  Nitrofurantoin: S <=32 Should not be used to treat pyelonephritis    -  Aztreonam: S <=4    -  Ciprofloxacin: S <=0.5    -  Ertapenem: S <=0.5    -  Imipenem: S <=1    Specimen Source: .Urine Clean Catch (Midstream)    Culture Results:   10,000 - 49,000 CFU/mL Klebsiella oxytoca    Organism Identification: Klebsiella oxytoca    Culture - Blood (18 @ 11:31)  Specimen Source: .Blood Blood-Peripheral  Culture Results:   No growth to date.      RADIOLOGY:  Xray Chest 1 View- PORTABLE-Urgent (18 @ 20:32)   Right lower lung patchy opacities, some of which partially obscure the   right heart are likely due to atelectasis. Follow Up:  Heart transplant     Interval History: No further hyperglycemic events. Feels well, slept well. No pain. No fevers or chills. Valcyte decreased to 450 once a day. Had diarrhea yesterday 2 episodes, but not today. NO abdominal pain.     ROS:    All other systems negative  Constitutional: no fever, no chills  HEENT:  no sore throat, no rhinorrhea  Cardiovascular:  no chest pain  Respiratory:  no SOB, no cough  GI:  a little loose stools yesterday and this morning, not watery. no abd pain, no vomiting  urinary: no dysuria  musculoskeletal:  no joint pain, no joint swelling    Allergies  No Known Allergies    ANTIMICROBIALS:    atovaquone Suspension 1500 daily  nystatin    Suspension [id number] every 6 hours  valGANciclovir  daily  mavyret 100mg/40mg  tablet 3 Tablet(s) 3 Tablet(s) Oral daily    OTHER MEDS:  amLODIPine   Tablet 5 milliGRAM(s) Oral daily  aspirin enteric coated 81 milliGRAM(s) Oral daily  atorvastatin 10 milliGRAM(s) Oral at bedtime  calcium carbonate 1250 mG + Vitamin D (OsCal 500 + D) 2 Tablet(s) Oral two times a day  diltiazem    milliGRAM(s) Oral daily  enoxaparin Injectable 80 milliGRAM(s) SubCutaneous two times a day  famotidine    Tablet 20 milliGRAM(s) Oral two times a day  hydrochlorothiazide 25 milliGRAM(s) Oral daily  insulin glargine Injectable (LANTUS) 12 Unit(s) SubCutaneous at bedtime  insulin lispro (HumaLOG) corrective regimen sliding scale   SubCutaneous three times a day before meals  insulin lispro (HumaLOG) corrective regimen sliding scale   SubCutaneous at bedtime  insulin lispro Injectable (HumaLOG) 6 Unit(s) SubCutaneous three times a day before meals  magnesium oxide 400 milliGRAM(s) Oral every 12 hours  multivitamin 1 Tablet(s) Oral daily  mycophenolate mofetil 1000 milliGRAM(s) Oral two times a day  predniSONE   Tablet 7.5 milliGRAM(s) Oral two times a day  silver sulfADIAZINE 1% Cream 1 Application(s) Topical two times a day  sodium bicarbonate 1300 milliGRAM(s) Oral every 8 hours  sodium chloride 0.9% lock flush 3 milliLiter(s) IV Push every 8 hours  tacrolimus 6 milliGRAM(s) Oral two times a day    Vital Signs Last 24 Hrs  T(C): 36.5 (26 May 2018 07:45), Max: 36.8 (25 May 2018 19:04)  T(F): 97.7 (26 May 2018 07:45), Max: 98.2 (25 May 2018 19:04)  HR: 96 (26 May 2018 07:45) (95 - 102)  BP: 115/81 (26 May 2018 07:45) (91/59 - 119/80)  BP(mean): 95 (25 May 2018 23:34) (71 - 95)  RR: 18 (25 May 2018 23:34) (18 - 18)  SpO2: 98% (25 May 2018 23:34) (98% - 100%)  Physical Exam:  General:    NAD,  non toxic, A&O x 3  HEENT:    no oropharyngeal lesions,   no LAD,   neck supple  Cardiovascular:  regular rate and rhythm   Respiratory:  nonlabored, clear b/l,    no wheezing  abd:   soft,   BS +,   no tenderness,    no organomegaly  :   no  diaz  Musculoskeletal: no joint swelling,   no edema  Skin:    no rash. chest and abdominal scars well healed   Neurologic:     no focal deficit  Extremities: R middle finger with distal necrosis, no obvious infection                                     12.1   3.1   )-----------( 193      ( 26 May 2018 07:39 )             38.7                12.2   3.4   )-----------( 210      ( 25 May 2018 07:36 )             38.6     05-26    134<L>  |  96  |  27<H>  ----------------------------<  195<H>  3.8   |  25  |  1.20    Ca    9.1      26 May 2018 07:39        05-25    135  |  97  |  28<H>  ----------------------------<  145<H>  4.1   |  24  |  1.30    Ca    8.9      25 May 2018 07:36    TPro  6.4  /  Alb  3.6  /  TBili  0.8  /  DBili  x   /  AST  29  /  ALT  19  /  AlkPhos  58  05-25      Urinalysis Basic - ( 23 May 2018 20:06 )    Color: Yellow / Appearance: Clear / S.025 / pH: x  Gluc: x / Ketone: Negative  / Bili: Negative / Urobili: Negative mg/dL   Blood: x / Protein: Negative mg/dL / Nitrite: Negative   Leuk Esterase: Negative / RBC: x / WBC x   Sq Epi: x / Non Sq Epi: x / Bacteria: x        MICROBIOLOGY:  Culture - Urine (18 @ 13:31)    -  Gentamicin: S <=1    -  Tigecycline: S <=1    -  Trimethoprim/Sulfamethoxazole: S <=0.5/9.5    -  Amikacin: S <=8    -  Ampicillin: R >16 These ampicillin results predict results for amoxicillin    -  Cefazolin: S 8    -  Cefoxitin: I 16    -  Ampicillin/Sulbactam: S 8/4    -  Ceftriaxone: S <=1 Enterobacter, Citrobacter, and Serratia may develop resistance during prolonged therapy    -  Cefepime: S <=2    -  Piperacillin/Tazobactam: S <=8    -  Tobramycin: S <=2    -  Amoxicillin/Clavulanic Acid: S <=8/4    -  Levofloxacin: S <=1    -  Meropenem: S <=1    -  Nitrofurantoin: S <=32 Should not be used to treat pyelonephritis    -  Aztreonam: S <=4    -  Ciprofloxacin: S <=0.5    -  Ertapenem: S <=0.5    -  Imipenem: S <=1    Specimen Source: .Urine Clean Catch (Midstream)    Culture Results:   10,000 - 49,000 CFU/mL Klebsiella oxytoca    Organism Identification: Klebsiella oxytoca    Culture - Blood (18 @ 11:31)  Specimen Source: .Blood Blood-Peripheral  Culture Results:   No growth to date.      RADIOLOGY:  Xray Chest 1 View- PORTABLE-Urgent (18 @ 20:32)   Right lower lung patchy opacities, some of which partially obscure the   right heart are likely due to atelectasis.

## 2018-05-26 NOTE — PROGRESS NOTE ADULT - PROBLEM SELECTOR PLAN 7
HCV donor: Continue with Mavyret 100mg/40mg 3 Tablet(s) Oral daily  PCP/Toxoplasmosis prophylaxis: Continue mepron 1500 po daily   CMV prophylaxis: Decrease valcyte 450 mg PO daily   Oral Candidiasis: Nystatin 500,000 U oral q 6 hours  CAD prophylaxis:  ASA STATIN   GI PPX: H2 blocker, pepcid daily  DVT PPX: covered by Lovenox BID

## 2018-05-26 NOTE — PROGRESS NOTE ADULT - PROBLEM SELECTOR PLAN 6
HCV donor: Continue with Mavyret 100mg/40mg 3 Tablet(s) Oral daily  PCP/Toxoplasmosis prophylaxis: Continue mepron 1500 po daily   CMV prophylaxis: Decrease valcyte 450 mg PO daily   Oral Candidiasis: Nystatin 500,000 U oral q 6 hours  CAD prophylaxis:  ASA STATIN   GI PPX: H2 blocker, pepcid daily  DVT PPX: covered by Lovenox BID D/C planning to home with home care and supervision at home with insulin therapy and glucose monitoring.  Requires re-demonstration of ability to administer insulin prior to discharge with documented teach-back  Endocrine clinic follow up to be scheduled upon discharge

## 2018-05-26 NOTE — PROGRESS NOTE ADULT - PROBLEM SELECTOR PLAN 3
CK FK level Daily at 0730  Prograf 6mg bid   Cellcept to 750 mg PO bid,   Continue with prednisone 7.5 mg po BID

## 2018-05-27 LAB
ANION GAP SERPL CALC-SCNC: 7 MMOL/L — SIGNIFICANT CHANGE UP (ref 5–17)
BUN SERPL-MCNC: 30 MG/DL — HIGH (ref 7–23)
CALCIUM SERPL-MCNC: 8.9 MG/DL — SIGNIFICANT CHANGE UP (ref 8.4–10.5)
CHLORIDE SERPL-SCNC: 99 MMOL/L — SIGNIFICANT CHANGE UP (ref 96–108)
CO2 SERPL-SCNC: 29 MMOL/L — SIGNIFICANT CHANGE UP (ref 22–31)
CREAT SERPL-MCNC: 1.29 MG/DL — SIGNIFICANT CHANGE UP (ref 0.5–1.3)
GLUCOSE BLDC GLUCOMTR-MCNC: 140 MG/DL — HIGH (ref 70–99)
GLUCOSE BLDC GLUCOMTR-MCNC: 189 MG/DL — HIGH (ref 70–99)
GLUCOSE BLDC GLUCOMTR-MCNC: 203 MG/DL — HIGH (ref 70–99)
GLUCOSE BLDC GLUCOMTR-MCNC: 87 MG/DL — SIGNIFICANT CHANGE UP (ref 70–99)
GLUCOSE SERPL-MCNC: 130 MG/DL — HIGH (ref 70–99)
HCT VFR BLD CALC: 37.5 % — LOW (ref 39–50)
HCV RNA FLD QL NAA+PROBE: SIGNIFICANT CHANGE UP
HCV RNA SPEC QL PROBE+SIG AMP: SIGNIFICANT CHANGE UP
HGB BLD-MCNC: 11.4 G/DL — LOW (ref 13–17)
MCHC RBC-ENTMCNC: 28.2 PG — SIGNIFICANT CHANGE UP (ref 27–34)
MCHC RBC-ENTMCNC: 30.4 GM/DL — LOW (ref 32–36)
MCV RBC AUTO: 92.8 FL — SIGNIFICANT CHANGE UP (ref 80–100)
PLATELET # BLD AUTO: 200 K/UL — SIGNIFICANT CHANGE UP (ref 150–400)
POTASSIUM SERPL-MCNC: 4.3 MMOL/L — SIGNIFICANT CHANGE UP (ref 3.5–5.3)
POTASSIUM SERPL-SCNC: 4.3 MMOL/L — SIGNIFICANT CHANGE UP (ref 3.5–5.3)
RBC # BLD: 4.04 M/UL — LOW (ref 4.2–5.8)
RBC # FLD: 19.8 % — HIGH (ref 10.3–14.5)
SODIUM SERPL-SCNC: 135 MMOL/L — SIGNIFICANT CHANGE UP (ref 135–145)
TACROLIMUS SERPL-MCNC: 12.2 NG/ML — SIGNIFICANT CHANGE UP
WBC # BLD: 3.4 K/UL — LOW (ref 3.8–10.5)
WBC # FLD AUTO: 3.4 K/UL — LOW (ref 3.8–10.5)

## 2018-05-27 PROCEDURE — 99233 SBSQ HOSP IP/OBS HIGH 50: CPT

## 2018-05-27 RX ADMIN — SODIUM CHLORIDE 3 MILLILITER(S): 9 INJECTION INTRAMUSCULAR; INTRAVENOUS; SUBCUTANEOUS at 16:55

## 2018-05-27 RX ADMIN — ATOVAQUONE 1500 MILLIGRAM(S): 750 SUSPENSION ORAL at 11:53

## 2018-05-27 RX ADMIN — ENOXAPARIN SODIUM 70 MILLIGRAM(S): 100 INJECTION SUBCUTANEOUS at 05:42

## 2018-05-27 RX ADMIN — MYCOPHENOLATE MOFETIL 750 MILLIGRAM(S): 250 CAPSULE ORAL at 20:04

## 2018-05-27 RX ADMIN — VALGANCICLOVIR 450 MILLIGRAM(S): 450 TABLET, FILM COATED ORAL at 11:53

## 2018-05-27 RX ADMIN — MYCOPHENOLATE MOFETIL 750 MILLIGRAM(S): 250 CAPSULE ORAL at 08:12

## 2018-05-27 RX ADMIN — Medication 1300 MILLIGRAM(S): at 22:17

## 2018-05-27 RX ADMIN — Medication 8 UNIT(S): at 17:11

## 2018-05-27 RX ADMIN — SODIUM CHLORIDE 3 MILLILITER(S): 9 INJECTION INTRAMUSCULAR; INTRAVENOUS; SUBCUTANEOUS at 05:43

## 2018-05-27 RX ADMIN — TACROLIMUS 6 MILLIGRAM(S): 5 CAPSULE ORAL at 20:03

## 2018-05-27 RX ADMIN — Medication 1 APPLICATION(S): at 05:43

## 2018-05-27 RX ADMIN — Medication 7.5 MILLIGRAM(S): at 05:42

## 2018-05-27 RX ADMIN — FAMOTIDINE 20 MILLIGRAM(S): 10 INJECTION INTRAVENOUS at 18:11

## 2018-05-27 RX ADMIN — Medication 2 TABLET(S): at 18:11

## 2018-05-27 RX ADMIN — Medication 500000 UNIT(S): at 18:13

## 2018-05-27 RX ADMIN — SODIUM CHLORIDE 3 MILLILITER(S): 9 INJECTION INTRAMUSCULAR; INTRAVENOUS; SUBCUTANEOUS at 22:11

## 2018-05-27 RX ADMIN — Medication 8 UNIT(S): at 11:54

## 2018-05-27 RX ADMIN — Medication 1 APPLICATION(S): at 18:13

## 2018-05-27 RX ADMIN — ENOXAPARIN SODIUM 70 MILLIGRAM(S): 100 INJECTION SUBCUTANEOUS at 18:11

## 2018-05-27 RX ADMIN — Medication 500000 UNIT(S): at 05:42

## 2018-05-27 RX ADMIN — Medication 500000 UNIT(S): at 11:56

## 2018-05-27 RX ADMIN — Medication 7.5 MILLIGRAM(S): at 18:11

## 2018-05-27 RX ADMIN — Medication 180 MILLIGRAM(S): at 05:42

## 2018-05-27 RX ADMIN — Medication 1 TABLET(S): at 11:55

## 2018-05-27 RX ADMIN — Medication 500000 UNIT(S): at 00:44

## 2018-05-27 RX ADMIN — Medication 1300 MILLIGRAM(S): at 05:42

## 2018-05-27 RX ADMIN — MAGNESIUM OXIDE 400 MG ORAL TABLET 400 MILLIGRAM(S): 241.3 TABLET ORAL at 18:11

## 2018-05-27 RX ADMIN — INSULIN GLARGINE 13 UNIT(S): 100 INJECTION, SOLUTION SUBCUTANEOUS at 22:17

## 2018-05-27 RX ADMIN — Medication 8 UNIT(S): at 07:47

## 2018-05-27 RX ADMIN — Medication 2 TABLET(S): at 08:12

## 2018-05-27 RX ADMIN — Medication 81 MILLIGRAM(S): at 11:53

## 2018-05-27 RX ADMIN — FAMOTIDINE 20 MILLIGRAM(S): 10 INJECTION INTRAVENOUS at 05:43

## 2018-05-27 RX ADMIN — TACROLIMUS 6 MILLIGRAM(S): 5 CAPSULE ORAL at 08:11

## 2018-05-27 RX ADMIN — ATORVASTATIN CALCIUM 10 MILLIGRAM(S): 80 TABLET, FILM COATED ORAL at 22:17

## 2018-05-27 RX ADMIN — Medication 4: at 11:54

## 2018-05-27 RX ADMIN — MAGNESIUM OXIDE 400 MG ORAL TABLET 400 MILLIGRAM(S): 241.3 TABLET ORAL at 05:42

## 2018-05-27 RX ADMIN — Medication 1300 MILLIGRAM(S): at 17:10

## 2018-05-27 NOTE — CHART NOTE - NSCHARTNOTEFT_GEN_A_CORE
5/27/18 - 10:25am-  pt ambulating to bathroom.  Pt. states "My right foot slipped" pt. found on buttocks. Pt. had socks on.  NO complaints of pain after fall  VSS - will check VS 1hr. post-fall & 4 hrs post-fall. Neuro exam WNL  will monitor   pt. instructed to ring call bell prior to getting OOB and going to the bathroom.  PE/ VSS- no events on tele  cor S1S2  lungs: CTA  MTinc: healing/stable  ext no edema no calf tenderness

## 2018-05-27 NOTE — PROGRESS NOTE ADULT - PROBLEM SELECTOR PLAN 1
-Basal bolus dosing increased with Lantus 13 units q hs and Humalog 8 with meals   -Goal blood glucose 100-140  -Ck  HCV viral load   Blood cultures negative  CMV titer ab +  -Glucose monitoring AC and HS. Patient and family glucometer teaching implemented.    -Insulin administration instruction to be initiated with brother and patient.   Reviewed use of insulin pen, requires prompting for correct administration.  -Consistent Carb diet with nutrition follow up for dietary teaching  - Endo following

## 2018-05-27 NOTE — PROGRESS NOTE ADULT - ASSESSMENT
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 Now presenting with hyperglycemia, glucose initally >700,  likely 2/2 to steroids vs. prograf.   Hospital course:  5/24: Weaned off Insulin gtt transitioned to basal bolus insulin. Glucometer/Insulin teaching initiated  5/25: Overnight: HR with low noted to be 75-80 with sleep; Hypotensive with SPB 91; Decrease Cardizem to 180 mg daily; Prograft level 17.5; Mild neutropenia WBC 3.4 Decrease valganciclovir 450 mg po daily and Cellcept to 750 mg PO BID. Maintain Tacrolimus 6 mg PO BID; Continue with Diabetic teaching. Endo following for hyperglycemia; Lantus increased to 13 units HA and pre meal increased to 8 units TID.  5/26 VVS; Continue with current medication regimen.  Monitor leukopenia WBC 3.1.  Awaiting HCV viral load. Hypotension SBP 90's, Norvasc and HCTZ D/C'd. Urine culture with Klebsiella oxytoca; patient asymptomatic per ID no abx warranted at this time--> continue to monitor.  Progaf level 15; continue with current dose. Supplement potassium 40 meq PO x 1 for K+ 3.8.    5/27: CMV titer:   Disposition: Home once medically cleared

## 2018-05-27 NOTE — PROGRESS NOTE ADULT - ASSESSMENT
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 Now presenting with hyperglycemia likely 2/2 to steroids vs. prograf.     #Hyperglycemia  - Now on basal bolus. C/w endocrine recs  - Nutrition and Dietary education  - Insulin injection education    #Immunosuppresion  Tacro level 15.4 yesterday   - c/w Cellcept 1000mg BID, Prednisone 7.5mg BID, prograf  5mg bid    #PPX  - Valcyte 450mg BID, Mepron, Nystatin     #DVT  - Lovenox 80mg BID    #HTN  - Will reduce Cardizem to 120mg, c/w hCTZ and Norvasc    Jorge Call MD  Cardiology Fellow   Please call me at [phone] for any further questions up till 5 pm. For after hours, please call the covering cardiology on call fellow at [phone] for any further assistance.

## 2018-05-27 NOTE — PROGRESS NOTE ADULT - PROBLEM SELECTOR PLAN 2
CK FK level Daily  Prograf 6mg bid for level of 15 on 5/26  Continue with Cellcept to 750 mg PO bid.  Continue with prednisone 7.5 mg po BID

## 2018-05-27 NOTE — PROGRESS NOTE ADULT - PROBLEM SELECTOR PLAN 7
Goal SBP: 110/70  D/C HCTZ 25 po daily  D/C Norvasc 5 mg PO daily   Continue with Cardizem 180 mg PO daily

## 2018-05-27 NOTE — PROGRESS NOTE ADULT - PROBLEM SELECTOR PLAN 6
D/C planning to home with home care and supervision at home with insulin therapy and glucose monitoring.  Requires re-demonstration of ability to administer insulin prior to discharge with documented teach-back  Endocrine clinic follow up to be scheduled upon discharge

## 2018-05-27 NOTE — PROGRESS NOTE ADULT - ATTENDING COMMENTS
BS controlled. Administering his own insulin intermittently.  Fall today on going to rest room     aspirin enteric coated 81 milliGRAM(s) Oral daily  diltiazem    milliGRAM(s) Oral daily  enoxaparin Injectable 70 milliGRAM(s) SubCutaneous two times a day  atovaquone Suspension 1500 milliGRAM(s) Oral daily  nystatin    Suspension 086734 Unit(s) Oral every 6 hours  valGANciclovir 450 milliGRAM(s) Oral daily  famotidine    Tablet 20 milliGRAM(s) Oral two times a day  atorvastatin 10 milliGRAM(s) Oral at bedtime  mycophenolate mofetil 750 milliGRAM(s) Oral two times a day  tacrolimus 6 milliGRAM(s) Oral two times a day  predniSONE   Tablet 7.5 milliGRAM(s) Oral two times a day    HR: 108 (05-27-18 @ 08:26) (93 - 109)  BP: 99/69 (05-27-18 @ 08:26) (97/68 - 116/77)      Tac level 12.2  05-27    135  |  99  |  30<H>  ----------------------------<  130<H>  4.3   |  29  |  1.29    Ca    8.9      27 May 2018 07:39                          11.4   3.4   )-----------( 200      ( 27 May 2018 07:39 )             37.5   Slowly learning to self administer  WBC recovering.  Prograf level stable on decrease Dilt  Fall precautions.

## 2018-05-27 NOTE — PROGRESS NOTE ADULT - SUBJECTIVE AND OBJECTIVE BOX
CC:  Acute Decompensated Heart failure  24H hour events:  No acute events, the patient feels well, improved since admission.    MEDICATIONS:  aspirin enteric coated 81 milliGRAM(s) Oral daily  diltiazem    milliGRAM(s) Oral daily  enoxaparin Injectable 70 milliGRAM(s) SubCutaneous two times a day    atovaquone Suspension 1500 milliGRAM(s) Oral daily  nystatin    Suspension 945517 Unit(s) Oral every 6 hours  valGANciclovir 450 milliGRAM(s) Oral daily        famotidine    Tablet 20 milliGRAM(s) Oral two times a day    atorvastatin 10 milliGRAM(s) Oral at bedtime  insulin glargine Injectable (LANTUS) 13 Unit(s) SubCutaneous at bedtime  insulin lispro (HumaLOG) corrective regimen sliding scale   SubCutaneous three times a day before meals  insulin lispro (HumaLOG) corrective regimen sliding scale   SubCutaneous at bedtime  insulin lispro Injectable (HumaLOG) 8 Unit(s) SubCutaneous three times a day before meals  predniSONE   Tablet 7.5 milliGRAM(s) Oral two times a day    calcium carbonate 1250 mG + Vitamin D (OsCal 500 + D) 2 Tablet(s) Oral two times a day  magnesium oxide 400 milliGRAM(s) Oral every 12 hours  multivitamin 1 Tablet(s) Oral daily  mycophenolate mofetil 750 milliGRAM(s) Oral two times a day  silver sulfADIAZINE 1% Cream 1 Application(s) Topical two times a day  sodium bicarbonate 1300 milliGRAM(s) Oral every 8 hours  sodium chloride 0.9% lock flush 3 milliLiter(s) IV Push every 8 hours  tacrolimus 6 milliGRAM(s) Oral two times a day        PHYSICAL EXAM:  T(C): 36.2 (05-27-18 @ 08:26), Max: 36.7 (05-26-18 @ 15:23)  HR: 108 (05-27-18 @ 08:26) (93 - 109)  BP: 99/69 (05-27-18 @ 08:26) (97/68 - 116/77)  RR: 18 (05-27-18 @ 08:26) (18 - 18)  SpO2: 97% (05-27-18 @ 03:03) (97% - 98%)  I&O's Summary    26 May 2018 07:01  -  27 May 2018 07:00  --------------------------------------------------------  IN: 1190 mL / OUT: 1400 mL / NET: -210 mL        Appearance: Normal	  HEENT:   Normal oral mucosa  Lymphatic: No lymphadenopathy  Cardiovascular: Normal S1 S2,         No JVD,      No murmurs,      No edema  Respiratory: trace bibasilar crackles  Psychiatry: Mood & affect appropriate  Gastrointestinal:  Soft, Non-tender	  Skin: No rashes, No ecchymoses, No cyanosis	  Neurologic: Non-focal  Extremities: Normal range of motion, No clubbing, cyanosis   Vascular: warm extremities        LABS:	 	    CBC Full  -  ( 27 May 2018 07:39 )  WBC Count : 3.4 K/uL  Hemoglobin : 11.4 g/dL  Hematocrit : 37.5 %  Platelet Count - Automated : 200 K/uL  Mean Cell Volume : 92.8 fl  Mean Cell Hemoglobin : 28.2 pg  Mean Cell Hemoglobin Concentration : 30.4 gm/dL  Auto Neutrophil # : x  Auto Lymphocyte # : x  Auto Monocyte # : x  Auto Eosinophil # : x  Auto Basophil # : x  Auto Neutrophil % : x  Auto Lymphocyte % : x  Auto Monocyte % : x  Auto Eosinophil % : x  Auto Basophil % : x    05-27    135  |  99  |  30<H>  ----------------------------<  130<H>  4.3   |  29  |  1.29  05-26    134<L>  |  96  |  27<H>  ----------------------------<  195<H>  3.8   |  25  |  1.20    Ca    8.9      27 May 2018 07:39  Ca    9.1      26 May 2018 07:39      TELEMETRY: 	  ANA

## 2018-05-27 NOTE — PROGRESS NOTE ADULT - SUBJECTIVE AND OBJECTIVE BOX
Subjective: no events overnight    VITAL SIGNS  T(F): 97.9  HR: 93  BP: 112/78  RR: 18  SpO2: 97%    05-26-18 @ 07:01  -  05-27-18 @ 05:41  --------------------------------------------------------  OUT: 1400 mL / NET: -1400 mL    LAB: 5/27 to be drawn at 0730                        12.1   3.1   )-----------( 193      ( 26 May 2018 07:39 )             38.7   134<L>  |  96  |  27<H>  ----------------------------<  195<H>  3.8   |  25  |  1.20    CAPILLARY BLOOD GLUCOSE  POCT Blood Glucose.: 115 mg/dL (26 May 2018 22:03)  POCT Blood Glucose.: 123 mg/dL (26 May 2018 21:46)  POCT Blood Glucose.: 212 mg/dL (26 May 2018 16:35)  POCT Blood Glucose.: 177 mg/dL (26 May 2018 11:51)  POCT Blood Glucose.: 195 mg/dL (26 May 2018    MEDICATIONS  asa 81  Lovenox 70 bid  atorvastatin 10 milliGRAM(s) Oral at bedtime  atovaquone Suspension 1500 milliGRAM(s) Oral daily  diltiazem    milliGRAM(s) Oral daily  famotidine    Tablet 20 milliGRAM(s) Oral two times a day  insulin glargine Injectable (LANTUS) 13 Unit(s) SubCutaneous at bedtime  insulin lispro (HumaLOG) corrective regimen sliding scale   SubCutaneous three times a day before meals  insulin lispro (HumaLOG) corrective regimen sliding scale   SubCutaneous at bedtime  insulin lispro Injectable (HumaLOG) 8 Unit(s) SubCutaneous three times a day before meals  nystatin    Suspension 213398 Unit(s) Oral every 6 hours  predniSONE   Tablet 7.5 milliGRAM(s) Oral two times a day  valGANciclovir 450 milliGRAM(s) Oral daily    PHYSICAL ASSESSMENT  GEN:  No distress  PSYCH: Appropriate, communicative  NEURO: non-focal, A+Ox3  HEENT: BRITTNEY MOSHERMI moist oral mucosa  CV: (+) S1 and S2, No murmurs, rubs, gallops or clicks   Sternal Wound:  MSI -->healed , sternum stable  Lungs: CTA B/L   Abdomen: soft, nontender, nondistended, positive bowel sounds, (+) Flatus; (+) BM   :  Voiding             Extremities:  B/L LE (-) edema; negative calf tenderness; (+) 2 DP palpable.  Right upper extremity thrid digit proximal tip necrotic with DSD in place, C/D/I

## 2018-05-28 LAB
ANION GAP SERPL CALC-SCNC: 9 MMOL/L — SIGNIFICANT CHANGE UP (ref 5–17)
BUN SERPL-MCNC: 27 MG/DL — HIGH (ref 7–23)
CALCIUM SERPL-MCNC: 8.7 MG/DL — SIGNIFICANT CHANGE UP (ref 8.4–10.5)
CHLORIDE SERPL-SCNC: 100 MMOL/L — SIGNIFICANT CHANGE UP (ref 96–108)
CO2 SERPL-SCNC: 27 MMOL/L — SIGNIFICANT CHANGE UP (ref 22–31)
CREAT SERPL-MCNC: 1.35 MG/DL — HIGH (ref 0.5–1.3)
GLUCOSE BLDC GLUCOMTR-MCNC: 136 MG/DL — HIGH (ref 70–99)
GLUCOSE BLDC GLUCOMTR-MCNC: 154 MG/DL — HIGH (ref 70–99)
GLUCOSE BLDC GLUCOMTR-MCNC: 163 MG/DL — HIGH (ref 70–99)
GLUCOSE BLDC GLUCOMTR-MCNC: 171 MG/DL — HIGH (ref 70–99)
GLUCOSE SERPL-MCNC: 154 MG/DL — HIGH (ref 70–99)
HCT VFR BLD CALC: 31.9 % — LOW (ref 39–50)
HGB BLD-MCNC: 10.4 G/DL — LOW (ref 13–17)
MCHC RBC-ENTMCNC: 30.4 PG — SIGNIFICANT CHANGE UP (ref 27–34)
MCHC RBC-ENTMCNC: 32.6 GM/DL — SIGNIFICANT CHANGE UP (ref 32–36)
MCV RBC AUTO: 93.3 FL — SIGNIFICANT CHANGE UP (ref 80–100)
PLATELET # BLD AUTO: 184 K/UL — SIGNIFICANT CHANGE UP (ref 150–400)
POTASSIUM SERPL-MCNC: 4.2 MMOL/L — SIGNIFICANT CHANGE UP (ref 3.5–5.3)
POTASSIUM SERPL-SCNC: 4.2 MMOL/L — SIGNIFICANT CHANGE UP (ref 3.5–5.3)
RBC # BLD: 3.42 M/UL — LOW (ref 4.2–5.8)
RBC # FLD: 19.4 % — HIGH (ref 10.3–14.5)
SODIUM SERPL-SCNC: 136 MMOL/L — SIGNIFICANT CHANGE UP (ref 135–145)
TACROLIMUS SERPL-MCNC: 11.1 NG/ML — SIGNIFICANT CHANGE UP
WBC # BLD: 3.1 K/UL — LOW (ref 3.8–10.5)
WBC # FLD AUTO: 3.1 K/UL — LOW (ref 3.8–10.5)

## 2018-05-28 PROCEDURE — 99233 SBSQ HOSP IP/OBS HIGH 50: CPT

## 2018-05-28 RX ADMIN — Medication 500000 UNIT(S): at 00:08

## 2018-05-28 RX ADMIN — MYCOPHENOLATE MOFETIL 750 MILLIGRAM(S): 250 CAPSULE ORAL at 08:00

## 2018-05-28 RX ADMIN — Medication 1 TABLET(S): at 13:00

## 2018-05-28 RX ADMIN — ATORVASTATIN CALCIUM 10 MILLIGRAM(S): 80 TABLET, FILM COATED ORAL at 22:18

## 2018-05-28 RX ADMIN — Medication 1300 MILLIGRAM(S): at 13:00

## 2018-05-28 RX ADMIN — FAMOTIDINE 20 MILLIGRAM(S): 10 INJECTION INTRAVENOUS at 06:17

## 2018-05-28 RX ADMIN — SODIUM CHLORIDE 3 MILLILITER(S): 9 INJECTION INTRAMUSCULAR; INTRAVENOUS; SUBCUTANEOUS at 06:15

## 2018-05-28 RX ADMIN — Medication 2 TABLET(S): at 06:15

## 2018-05-28 RX ADMIN — ENOXAPARIN SODIUM 70 MILLIGRAM(S): 100 INJECTION SUBCUTANEOUS at 06:18

## 2018-05-28 RX ADMIN — Medication 500000 UNIT(S): at 06:17

## 2018-05-28 RX ADMIN — VALGANCICLOVIR 450 MILLIGRAM(S): 450 TABLET, FILM COATED ORAL at 13:00

## 2018-05-28 RX ADMIN — MAGNESIUM OXIDE 400 MG ORAL TABLET 400 MILLIGRAM(S): 241.3 TABLET ORAL at 17:09

## 2018-05-28 RX ADMIN — Medication 7.5 MILLIGRAM(S): at 17:09

## 2018-05-28 RX ADMIN — Medication 2 TABLET(S): at 17:08

## 2018-05-28 RX ADMIN — TACROLIMUS 6 MILLIGRAM(S): 5 CAPSULE ORAL at 20:08

## 2018-05-28 RX ADMIN — Medication 8 UNIT(S): at 08:00

## 2018-05-28 RX ADMIN — MAGNESIUM OXIDE 400 MG ORAL TABLET 400 MILLIGRAM(S): 241.3 TABLET ORAL at 06:17

## 2018-05-28 RX ADMIN — Medication 2: at 11:35

## 2018-05-28 RX ADMIN — Medication 7.5 MILLIGRAM(S): at 06:00

## 2018-05-28 RX ADMIN — Medication 500000 UNIT(S): at 23:04

## 2018-05-28 RX ADMIN — ENOXAPARIN SODIUM 70 MILLIGRAM(S): 100 INJECTION SUBCUTANEOUS at 17:09

## 2018-05-28 RX ADMIN — Medication 8 UNIT(S): at 17:07

## 2018-05-28 RX ADMIN — Medication 2: at 08:00

## 2018-05-28 RX ADMIN — Medication 500000 UNIT(S): at 13:00

## 2018-05-28 RX ADMIN — Medication 1 APPLICATION(S): at 17:09

## 2018-05-28 RX ADMIN — Medication 1300 MILLIGRAM(S): at 23:04

## 2018-05-28 RX ADMIN — Medication 500000 UNIT(S): at 17:10

## 2018-05-28 RX ADMIN — FAMOTIDINE 20 MILLIGRAM(S): 10 INJECTION INTRAVENOUS at 17:08

## 2018-05-28 RX ADMIN — MYCOPHENOLATE MOFETIL 750 MILLIGRAM(S): 250 CAPSULE ORAL at 20:09

## 2018-05-28 RX ADMIN — Medication 1 APPLICATION(S): at 06:18

## 2018-05-28 RX ADMIN — Medication 1300 MILLIGRAM(S): at 06:16

## 2018-05-28 RX ADMIN — ATOVAQUONE 1500 MILLIGRAM(S): 750 SUSPENSION ORAL at 13:00

## 2018-05-28 RX ADMIN — SODIUM CHLORIDE 3 MILLILITER(S): 9 INJECTION INTRAMUSCULAR; INTRAVENOUS; SUBCUTANEOUS at 16:25

## 2018-05-28 RX ADMIN — Medication 2: at 17:08

## 2018-05-28 RX ADMIN — Medication 81 MILLIGRAM(S): at 13:00

## 2018-05-28 RX ADMIN — Medication 8 UNIT(S): at 11:35

## 2018-05-28 RX ADMIN — TACROLIMUS 6 MILLIGRAM(S): 5 CAPSULE ORAL at 08:00

## 2018-05-28 RX ADMIN — INSULIN GLARGINE 13 UNIT(S): 100 INJECTION, SOLUTION SUBCUTANEOUS at 22:18

## 2018-05-28 RX ADMIN — SODIUM CHLORIDE 3 MILLILITER(S): 9 INJECTION INTRAMUSCULAR; INTRAVENOUS; SUBCUTANEOUS at 22:17

## 2018-05-28 RX ADMIN — Medication 180 MILLIGRAM(S): at 06:16

## 2018-05-28 NOTE — PROGRESS NOTE ADULT - SUBJECTIVE AND OBJECTIVE BOX
Interval History: Pt feels well.  Blood sugar improved    Medications:  aspirin enteric coated 81 milliGRAM(s) Oral daily  atorvastatin 10 milliGRAM(s) Oral at bedtime  atovaquone Suspension 1500 milliGRAM(s) Oral daily  calcium carbonate 1250 mG + Vitamin D (OsCal 500 + D) 2 Tablet(s) Oral two times a day  diltiazem    milliGRAM(s) Oral daily  enoxaparin Injectable 70 milliGRAM(s) SubCutaneous two times a day  famotidine    Tablet 20 milliGRAM(s) Oral two times a day  insulin glargine Injectable (LANTUS) 13 Unit(s) SubCutaneous at bedtime  insulin lispro (HumaLOG) corrective regimen sliding scale   SubCutaneous three times a day before meals  insulin lispro (HumaLOG) corrective regimen sliding scale   SubCutaneous at bedtime  insulin lispro Injectable (HumaLOG) 8 Unit(s) SubCutaneous three times a day before meals  magnesium oxide 400 milliGRAM(s) Oral every 12 hours  mavyret 100mg/40mg  tablet 3 Tablet(s) 3 Tablet(s) Oral daily  multivitamin 1 Tablet(s) Oral daily  mycophenolate mofetil 750 milliGRAM(s) Oral two times a day  nystatin    Suspension 361046 Unit(s) Oral every 6 hours  predniSONE   Tablet 7.5 milliGRAM(s) Oral two times a day  silver sulfADIAZINE 1% Cream 1 Application(s) Topical two times a day  sodium bicarbonate 1300 milliGRAM(s) Oral every 8 hours  sodium chloride 0.9% lock flush 3 milliLiter(s) IV Push every 8 hours  tacrolimus 6 milliGRAM(s) Oral two times a day  valGANciclovir 450 milliGRAM(s) Oral daily    Vitals:  T(C): 36.7 (05-28-18 @ 06:12), Max: 36.9 (05-27-18 @ 11:27)  HR: 92 (05-28-18 @ 06:12) (91 - 116)  BP: 105/77 (05-28-18 @ 06:12) (95/59 - 142/89)  BP(mean): --  ABP: --  ABP(mean): --  RR: 18 (05-28-18 @ 06:12) (18 - 18)  SpO2: 100% (05-28-18 @ 03:18) (98% - 100%)        I&O's Summary    27 May 2018 07:01  -  28 May 2018 07:00  --------------------------------------------------------  IN: 120 mL / OUT: 500 mL / NET: -380 mL        Physical Exam:  Appearance: No Acute Distress  HEENT: No JVD  Cardiovascular: Normal S1 S2, No murmurs/rubs/gallops  Respiratory: Clear to auscultation bilaterally  Gastrointestinal: Soft, Non-tender	  Skin: No cyanosis	  Neurologic: Non-focal  Extremities: No LE edema  Psychiatry: A & O x 3, Mood & affect appropriate    Labs:                        10.4   3.1   )-----------( 184      ( 28 May 2018 07:20 )             31.9     05-28    136  |  100  |  27<H>  ----------------------------<  154<H>  4.2   |  27  |  1.35<H>    Ca    8.7      28 May 2018 07:20                        TELEMETRY:  90s

## 2018-05-28 NOTE — PROGRESS NOTE ADULT - SUBJECTIVE AND OBJECTIVE BOX
VITAL SIGNS-Tele: /AFib 80    Vital Signs Last 24 Hrs  T(C): 36.7 (05-28-18 @ 06:12), Max: 36.9 (05-27-18 @ 11:27)  HR: 92 (05-28-18 @ 06:12) (91 - 116)  BP: 105/77 (05-28-18 @ 06:12) (95/59 - 119/82)  SpO2: 100% (05-28-18 @ 03:18) (98% - 100%)         05-27 @ 07:01  -  05-28 @ 07:00  --------------------------------------------------------  IN: 120 mL / OUT: 500 mL / NET: -380 mL    05-28 @ 07:01  -  05-28 @ 10:46  --------------------------------------------------------  IN: 0 mL / OUT: 150 mL / NET: -150 mL  Daily     Daily     CAPILLARY BLOOD GLUCOSE  POCT Blood Glucose.: 171 mg/dL (28 May 2018 07:11)  POCT Blood Glucose.: 189 mg/dL (27 May 2018 21:53)  POCT Blood Glucose.: 87 mg/dL (27 May 2018 16:51)  POCT Blood Glucose.: 203 mg/dL (27 May 2018 11:28)        Drains:     MS palmer     [ x ] Drainage:        Pacing Wires        [ x ]   Settings:                                  Isolated  [  ]  Coumadin    [x ] YES          [  ]      NO         Reason:     af  PHYSICAL EXAM:  Neurology: alert and oriented x 3, nonfocal, no gross deficits  CV : S1S2  Sternal Wound :  CDI , Stable  Lungs: CTA  Abdomen: soft, nontender, nondistended, positive bowel sounds, last bowel movement    pre-op     Extremities:     no edema, no calf tenderness    aspirin enteric coated 81 milliGRAM(s) Oral daily  atorvastatin 10 milliGRAM(s) Oral at bedtime  atovaquone Suspension 1500 milliGRAM(s) Oral daily  calcium carbonate 1250 mG + Vitamin D (OsCal 500 + D) 2 Tablet(s) Oral two times a day  diltiazem    milliGRAM(s) Oral daily  enoxaparin Injectable 70 milliGRAM(s) SubCutaneous two times a day  famotidine    Tablet 20 milliGRAM(s) Oral two times a day  insulin glargine Injectable (LANTUS) 13 Unit(s) SubCutaneous at bedtime  insulin lispro Injectable (HumaLOG) 8 Unit(s) SubCutaneous three times a day before meals  magnesium oxide 400 milliGRAM(s) Oral every 12 hours  mavyret 100mg/40mg  tablet 3 Tablet(s) 3 Tablet(s) Oral daily  multivitamin 1 Tablet(s) Oral daily  mycophenolate mofetil 750 milliGRAM(s) Oral two times a day  nystatin    Suspension 099992 Unit(s) Oral every 6 hours  predniSONE   Tablet 7.5 milliGRAM(s) Oral two times a day  silver sulfADIAZINE 1% Cream 1 Application(s) Topical two times a day  sodium bicarbonate 1300 milliGRAM(s) Oral every 8 hours  sodium chloride 0.9% lock flush 3 milliLiter(s) IV Push every 8 hours  tacrolimus 6 milliGRAM(s) Oral two times a day  valGANciclovir 450 milliGRAM(s) Oral daily    Physical Therapy Rec:   Home  [  ]   Home w/ PT  [x  ]  Rehab  [  ]  Discussed with Cardiothoracic Team at AM rounds.

## 2018-05-28 NOTE — PROGRESS NOTE ADULT - ATTENDING COMMENTS
No events.    diltiazem    milliGRAM(s) Oral daily  enoxaparin Injectable 70 milliGRAM(s) SubCutaneous two times a day  mavyret 100mg/40mg  tablet 3 Tablet(s) 3 Tablet(s) Oral daily  mycophenolate mofetil 750 milliGRAM(s) Oral two times a day  predniSONE   Tablet 7.5 milliGRAM(s) Oral two times a day  tacrolimus 6 milliGRAM(s) Oral two times a day  valGANciclovir 450 milliGRAM(s) Oral daily  IN: 120 mL / OUT: 500 mL / NET: -380 mL    05-28    136  |  100  |  27<H>  ----------------------------<  154<H>  4.2   |  27  |  1.35<H>    Ca    8.7      28 May 2018 07:20                          10.4   3.1   )-----------( 184      ( 28 May 2018 07:20 )             31.9   tacrolymus 11.1   BS: 140-200.    T(C): 36.7 (05-28-18 @ 06:12), Max: 36.9 (05-27-18 @ 11:27)  HR: 92 (05-28-18 @ 06:12) (91 - 116)  BP: 105/77 (05-28-18 @ 06:12) (95/59 - 142/89)  exam unchanged.  leukopenia persists.   Plan;  Check CMV PCR  Continue current transplant regimen.  diabetic teaching.  possible d/c tomorrow if he is ready to manage his diabetes at home.  Marvin Campbell No events.    diltiazem    milliGRAM(s) Oral daily  enoxaparin Injectable 70 milliGRAM(s) SubCutaneous two times a day  mavyret 100mg/40mg  tablet 3 Tablet(s) 3 Tablet(s) Oral daily  mycophenolate mofetil 750 milliGRAM(s) Oral two times a day  predniSONE   Tablet 7.5 milliGRAM(s) Oral two times a day  tacrolimus 6 milliGRAM(s) Oral two times a day  valGANciclovir 450 milliGRAM(s) Oral daily  IN: 120 mL / OUT: 500 mL / NET: -380 mL      05-28    136  |  100  |  27<H>  ----------------------------<  154<H>  4.2   |  27  |  1.35<H>    Ca    8.7      28 May 2018 07:20                          10.4   3.1   )-----------( 184      ( 28 May 2018 07:20 )             31.9   tacrolymus 11.1   BS: 140-200.  T(C): 36.7 (05-28-18 @ 06:12), Max: 36.9 (05-27-18 @ 11:27)  HR: 92 (05-28-18 @ 06:12) (91 - 116)  BP: 105/77 (05-28-18 @ 06:12) (95/59 - 142/89)  exam unchanged.  leukopenia persists.   Plan;  Check CMV PCR  Continue current transplant regimen.  diabetic teaching. please order the glucometer he will use as an outpatient.   possible d/c tomorrow if he is ready to manage his diabetes at home.  Marvin Campbell

## 2018-05-28 NOTE — PROGRESS NOTE ADULT - ASSESSMENT
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 Now presenting with hyperglycemia likely 2/2 to steroids vs. prograf.     #Hyperglycemia  - Now on basal bolus, improved. C/w endocrine recs  - Nutrition and Dietary education  - Insulin injection education    #Immunosuppresion  Tacro level 12.4 yesterday   - c/w Cellcept 1000mg BID, Prednisone 7.5mg BID, prograf  5mg bid    #PPX  - Valcyte 450mg BID, Mepron, Nystatin     #DVT  - Lovenox 80mg BID    #HTN  - Cont cardizem

## 2018-05-28 NOTE — PROGRESS NOTE ADULT - ASSESSMENT
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 Now presenting with hyperglycemia, glucose initally >700,  likely 2/2 to steroids vs. prograf.   Hospital course:  5/24: Weaned off Insulin gtt transitioned to basal bolus insulin. Glucometer/Insulin teaching initiated  5/25: Overnight: HR with low noted to be 75-80 with sleep; Hypotensive with SPB 91; Decrease Cardizem to 180 mg daily; Prograft level 17.5; Mild neutropenia WBC 3.4 Decrease valganciclovir 450 mg po daily and Cellcept to 750 mg PO BID. Maintain Tacrolimus 6 mg PO BID; Continue with Diabetic teaching. Endo following for hyperglycemia; Lantus increased to 13 units HA and pre meal increased to 8 units TID.  5/26 VVS; Continue with current medication regimen.  Monitor leukopenia WBC 3.1.  Awaiting HCV viral load. Hypotension SBP 90's, Norvasc and HCTZ D/C'd. Urine culture with Klebsiella oxytoca; patient asymptomatic per ID no abx warranted at this time--> continue to monitor.  Progaf level 15; continue with current dose. Supplement potassium 40 meq PO x 1 for K+ 3.8.    5/27: CMV titer:  5/28 VSS - no complaints - Prograf level = 11.1    Disposition: Home once medically cleared

## 2018-05-29 LAB
ANION GAP SERPL CALC-SCNC: 10 MMOL/L — SIGNIFICANT CHANGE UP (ref 5–17)
BUN SERPL-MCNC: 30 MG/DL — HIGH (ref 7–23)
CALCIUM SERPL-MCNC: 8.9 MG/DL — SIGNIFICANT CHANGE UP (ref 8.4–10.5)
CHLORIDE SERPL-SCNC: 98 MMOL/L — SIGNIFICANT CHANGE UP (ref 96–108)
CMV DNA CSF QL NAA+PROBE: SIGNIFICANT CHANGE UP
CMV DNA SPEC NAA+PROBE-LOG#: SIGNIFICANT CHANGE UP LOGIU/ML
CO2 SERPL-SCNC: 27 MMOL/L — SIGNIFICANT CHANGE UP (ref 22–31)
CREAT SERPL-MCNC: 1.59 MG/DL — HIGH (ref 0.5–1.3)
CULTURE RESULTS: SIGNIFICANT CHANGE UP
GLUCOSE BLDC GLUCOMTR-MCNC: 100 MG/DL — HIGH (ref 70–99)
GLUCOSE BLDC GLUCOMTR-MCNC: 107 MG/DL — HIGH (ref 70–99)
GLUCOSE BLDC GLUCOMTR-MCNC: 168 MG/DL — HIGH (ref 70–99)
GLUCOSE BLDC GLUCOMTR-MCNC: 275 MG/DL — HIGH (ref 70–99)
GLUCOSE SERPL-MCNC: 164 MG/DL — HIGH (ref 70–99)
HCT VFR BLD CALC: 29.6 % — LOW (ref 39–50)
HGB BLD-MCNC: 9.8 G/DL — LOW (ref 13–17)
MCHC RBC-ENTMCNC: 29.4 PG — SIGNIFICANT CHANGE UP (ref 27–34)
MCHC RBC-ENTMCNC: 33.1 GM/DL — SIGNIFICANT CHANGE UP (ref 32–36)
MCV RBC AUTO: 88.9 FL — SIGNIFICANT CHANGE UP (ref 80–100)
NIGHT BLUE STAIN TISS: SIGNIFICANT CHANGE UP
PLATELET # BLD AUTO: 236 K/UL — SIGNIFICANT CHANGE UP (ref 150–400)
POTASSIUM SERPL-MCNC: 4.2 MMOL/L — SIGNIFICANT CHANGE UP (ref 3.5–5.3)
POTASSIUM SERPL-SCNC: 4.2 MMOL/L — SIGNIFICANT CHANGE UP (ref 3.5–5.3)
RBC # BLD: 3.33 M/UL — LOW (ref 4.2–5.8)
RBC # FLD: 20.8 % — HIGH (ref 10.3–14.5)
SODIUM SERPL-SCNC: 135 MMOL/L — SIGNIFICANT CHANGE UP (ref 135–145)
SPECIMEN SOURCE: SIGNIFICANT CHANGE UP
SPECIMEN SOURCE: SIGNIFICANT CHANGE UP
TACROLIMUS SERPL-MCNC: 11.7 NG/ML — SIGNIFICANT CHANGE UP
WBC # BLD: 3.41 K/UL — LOW (ref 3.8–10.5)
WBC # FLD AUTO: 3.41 K/UL — LOW (ref 3.8–10.5)

## 2018-05-29 PROCEDURE — 99233 SBSQ HOSP IP/OBS HIGH 50: CPT

## 2018-05-29 PROCEDURE — 99232 SBSQ HOSP IP/OBS MODERATE 35: CPT

## 2018-05-29 RX ADMIN — Medication 500000 UNIT(S): at 12:18

## 2018-05-29 RX ADMIN — Medication 500000 UNIT(S): at 18:02

## 2018-05-29 RX ADMIN — Medication 1 TABLET(S): at 12:29

## 2018-05-29 RX ADMIN — Medication 1 APPLICATION(S): at 18:02

## 2018-05-29 RX ADMIN — INSULIN GLARGINE 13 UNIT(S): 100 INJECTION, SOLUTION SUBCUTANEOUS at 22:29

## 2018-05-29 RX ADMIN — Medication 7.5 MILLIGRAM(S): at 06:36

## 2018-05-29 RX ADMIN — Medication 1300 MILLIGRAM(S): at 06:39

## 2018-05-29 RX ADMIN — ENOXAPARIN SODIUM 70 MILLIGRAM(S): 100 INJECTION SUBCUTANEOUS at 06:38

## 2018-05-29 RX ADMIN — Medication 2 TABLET(S): at 18:57

## 2018-05-29 RX ADMIN — Medication 1 APPLICATION(S): at 06:37

## 2018-05-29 RX ADMIN — MYCOPHENOLATE MOFETIL 750 MILLIGRAM(S): 250 CAPSULE ORAL at 08:01

## 2018-05-29 RX ADMIN — Medication 7.5 MILLIGRAM(S): at 18:01

## 2018-05-29 RX ADMIN — SODIUM CHLORIDE 3 MILLILITER(S): 9 INJECTION INTRAMUSCULAR; INTRAVENOUS; SUBCUTANEOUS at 06:39

## 2018-05-29 RX ADMIN — MAGNESIUM OXIDE 400 MG ORAL TABLET 400 MILLIGRAM(S): 241.3 TABLET ORAL at 06:36

## 2018-05-29 RX ADMIN — Medication 180 MILLIGRAM(S): at 06:36

## 2018-05-29 RX ADMIN — ATOVAQUONE 1500 MILLIGRAM(S): 750 SUSPENSION ORAL at 12:17

## 2018-05-29 RX ADMIN — Medication 81 MILLIGRAM(S): at 12:18

## 2018-05-29 RX ADMIN — Medication 2 TABLET(S): at 08:00

## 2018-05-29 RX ADMIN — Medication 8 UNIT(S): at 07:58

## 2018-05-29 RX ADMIN — Medication 1300 MILLIGRAM(S): at 22:28

## 2018-05-29 RX ADMIN — MAGNESIUM OXIDE 400 MG ORAL TABLET 400 MILLIGRAM(S): 241.3 TABLET ORAL at 18:02

## 2018-05-29 RX ADMIN — SODIUM CHLORIDE 3 MILLILITER(S): 9 INJECTION INTRAMUSCULAR; INTRAVENOUS; SUBCUTANEOUS at 22:27

## 2018-05-29 RX ADMIN — VALGANCICLOVIR 450 MILLIGRAM(S): 450 TABLET, FILM COATED ORAL at 12:18

## 2018-05-29 RX ADMIN — TACROLIMUS 6 MILLIGRAM(S): 5 CAPSULE ORAL at 20:05

## 2018-05-29 RX ADMIN — FAMOTIDINE 20 MILLIGRAM(S): 10 INJECTION INTRAVENOUS at 06:36

## 2018-05-29 RX ADMIN — SODIUM CHLORIDE 3 MILLILITER(S): 9 INJECTION INTRAMUSCULAR; INTRAVENOUS; SUBCUTANEOUS at 14:39

## 2018-05-29 RX ADMIN — FAMOTIDINE 20 MILLIGRAM(S): 10 INJECTION INTRAVENOUS at 18:03

## 2018-05-29 RX ADMIN — ENOXAPARIN SODIUM 70 MILLIGRAM(S): 100 INJECTION SUBCUTANEOUS at 18:02

## 2018-05-29 RX ADMIN — MYCOPHENOLATE MOFETIL 750 MILLIGRAM(S): 250 CAPSULE ORAL at 20:06

## 2018-05-29 RX ADMIN — Medication 500000 UNIT(S): at 06:37

## 2018-05-29 RX ADMIN — Medication 2: at 07:58

## 2018-05-29 RX ADMIN — Medication 6: at 12:17

## 2018-05-29 RX ADMIN — Medication 8 UNIT(S): at 12:17

## 2018-05-29 RX ADMIN — Medication 8 UNIT(S): at 17:07

## 2018-05-29 RX ADMIN — Medication 500000 UNIT(S): at 23:42

## 2018-05-29 RX ADMIN — ATORVASTATIN CALCIUM 10 MILLIGRAM(S): 80 TABLET, FILM COATED ORAL at 22:28

## 2018-05-29 RX ADMIN — TACROLIMUS 6 MILLIGRAM(S): 5 CAPSULE ORAL at 08:00

## 2018-05-29 RX ADMIN — Medication 1300 MILLIGRAM(S): at 14:43

## 2018-05-29 NOTE — PROGRESS NOTE ADULT - SUBJECTIVE AND OBJECTIVE BOX
Interval History:  - pt reports feeling well  - has had some vascular lesions on arms and one on abdomen that were not at sites of lovenox  - using insulin but requires reinforcement    Medications:  aspirin enteric coated 81 milliGRAM(s) Oral daily  atorvastatin 10 milliGRAM(s) Oral at bedtime  atovaquone Suspension 1500 milliGRAM(s) Oral daily  calcium carbonate 1250 mG + Vitamin D (OsCal 500 + D) 2 Tablet(s) Oral two times a day  diltiazem    milliGRAM(s) Oral daily  enoxaparin Injectable 70 milliGRAM(s) SubCutaneous two times a day  famotidine    Tablet 20 milliGRAM(s) Oral two times a day  insulin glargine Injectable (LANTUS) 13 Unit(s) SubCutaneous at bedtime  insulin lispro (HumaLOG) corrective regimen sliding scale   SubCutaneous three times a day before meals  insulin lispro (HumaLOG) corrective regimen sliding scale   SubCutaneous at bedtime  insulin lispro Injectable (HumaLOG) 8 Unit(s) SubCutaneous three times a day before meals  magnesium oxide 400 milliGRAM(s) Oral every 12 hours  mavyret 100mg/40mg  tablet 3 Tablet(s) 3 Tablet(s) Oral daily  multivitamin 1 Tablet(s) Oral daily  mycophenolate mofetil 750 milliGRAM(s) Oral two times a day  nystatin    Suspension 600940 Unit(s) Oral every 6 hours  predniSONE   Tablet 7.5 milliGRAM(s) Oral two times a day  silver sulfADIAZINE 1% Cream 1 Application(s) Topical two times a day  sodium bicarbonate 1300 milliGRAM(s) Oral every 8 hours  sodium chloride 0.9% lock flush 3 milliLiter(s) IV Push every 8 hours  tacrolimus 6 milliGRAM(s) Oral two times a day  valGANciclovir 450 milliGRAM(s) Oral daily    Vitals:  T(C): 36.8 (18 @ 20:13), Max: 36.9 (18 @ 03:15)  HR: 100 (18 @ 20:13) (91 - 100)  BP: 109/78 (18 @ 20:13) (103/77 - 111/71)  BP(mean): 78 (18 @ 20:13) (78 - 86)  RR: 18 (18 @ 20:13) (18  18)  SpO2: 100% (18 @ 20:13) (99% - 110%)    Daily     Daily Weight in k.3 (29 May 2018 11:29)    I&O's Summary    28 May 2018 07:  -  29 May 2018 07:00  --------------------------------------------------------  IN: 1160 mL / OUT: 1150 mL / NET: 10 mL    29 May 2018 07:  -  29 May 2018 23:26  --------------------------------------------------------  IN: 880 mL / OUT: 600 mL / NET: 280 mL    Physical Exam:  Appearance: No Acute Distress  HEENT: No JVD  Cardiovascular: Normal S1 S2, No murmurs/rubs/gallops  Respiratory: Clear to auscultation bilaterally  Gastrointestinal: Soft, Non-tender	  Skin: No cyanosis	  Neurologic: Non-focal  Extremities: No LE edema  Psychiatry: A & O x 3, Mood & affect appropriate  Derm: two lesions on each arm approx <1 cm, papular with purplish discoloration; 3 cm lesion on abdomen appears vascular in nature, bleeding    Labs:                        9.8    3.41  )-----------( 236      ( 29 May 2018 07:51 )             29.6         135  |  98  |  30<H>  ----------------------------<  164<H>  4.2   |  27  |  1.59<H>    Ca    8.9      29 May 2018 07:24

## 2018-05-29 NOTE — PROGRESS NOTE ADULT - ASSESSMENT
67 year old man with ACC/AHA stage D chronic systolic heart failure due to NICM s/p HM2 LVAD 6/17 c/b pump thrombosis now s/p heart transplant on 2/23 (CMV D-/R+ and Toxo D-/R+), with post-op course c/b primary graft dysfunction, initially thought to be immune-mediated due to positive B-cell flow (negative CDC cross match) and received plasmapharesis/IVIg x2 with improvement in LV function. His course has been complicated by bilateral IJ/subclavian thrombi (on lovenox). Given persistent C4D staining and decline in LV function in April he was treated with plasmapharesis/IVIg and rituxan (received 2 doses, last 5/9) for suspected AMR (noted to have non-HLA Ab to angiotension II). Has since started Mavyret for HCV treatment. Was admitted for hyperglycemia, hyponatremia and acute kidney injury on 5/23 which has improved with insulin therapy. Noted to have lesions that on arms/abdomen that were new. Labs also notable for leukopenia for which cellcept was reduced.     Immunosuppression  - tacro level 11.7 (prev 11.1); on tacro 6/6 since 5/24; continue for now  - on cellcept 750 mg bid for leukopenia; may require reducing to 500 mg q12  - on pred 7.5 q12; continue for now    Graft function   - EF 48-50% on 5/24 (unchanged from TTE 5/9)    CAV PPx  - on ASA 81 mg daily  - on lipitor 10 mg daily    Antimicrobial PPx  - on nystatin; ? will consider d/c'ing given >1 month out per protocol  - continue mepron 1500 mg daily  - on valcyte 450 mg daily (renally dosed)    Diabetes - new onset likely 2/2 steroids/prograf  - appreciate endo assistance  - BGs improved    Renal dysfunction   - improved  - continue monitoring for now  - on sodium bicarb for hyperkalemia which has resolved

## 2018-05-29 NOTE — PROGRESS NOTE ADULT - ASSESSMENT
67 y/o M w/h/o HTN/CHF/STV/Tony Fib>AICD/PAF/NICM>LVAD and more recently heart tx c/b DVT on Lovenox. Here with S&Sx of hyperglycemia and BG >900s likely due to steroid therapy and prograf (NODAT). Pt now requiring insulin. Participating in education but still unable to demonstrate proper use of insulin pen. Also not doing finger pricks yet. However, pt appears motivated to learn. Reviewed proper use of insulin pen and pt was able to give return demonstration requiring some assistance. He needs reinforcement of teaching> Spoke to pt's RN to review finger stick and insulin pen procedure as well as supervising pt with insulin injections> can use side of thighs instead of abdomen.  Pt will need some assistance with insulin therapy upon discharge> Per pt his brothers can help him > they will need to be instructed prior to discharge.   Spent over 30 minutes reviewing DM plan ot care and reinforcing proper use of insulin pen x2.

## 2018-05-29 NOTE — PROGRESS NOTE ADULT - SUBJECTIVE AND OBJECTIVE BOX
Subjective:   Events overnight: None  CC: "Do you have any mints?"    VITAL SIGNS  T(F): 97.7  HR: 96  BP: 109/77  RR: 17  SpO2: 99% on room air    05-27-18 @ 07:01  -  05-28-18 @ 07:00  --------------------------------------------------------  OUT: 500 mL / NET: -500 mL    05-28-18 @ 07:01  -  05-29-18 @ 04:10  --------------------------------------------------------  OUT: 950 mL / NET: -950 mL    LAB: 5/29                        10.4   3.1   )-----------( 184      ( 28 May 2018 07:20 )             31.9     136  |  100  |  27<H>  ----------------------------<  154<H>  4.2   |  27  |  1.35<H>      CAPILLARY BLOOD GLUCOSE    DIAGNOSTICS    MEDICATIONS  aspirin enteric coated 81 milliGRAM(s) Oral daily  atorvastatin 10 milliGRAM(s) Oral at bedtime  atovaquone Suspension 1500 milliGRAM(s) Oral daily  calcium carbonate 1250 mG + Vitamin D (OsCal 500 + D) 2 Tablet(s) Oral two times a day  diltiazem    milliGRAM(s) Oral daily  enoxaparin Injectable 70 milliGRAM(s) SubCutaneous two times a day  famotidine    Tablet 20 milliGRAM(s) Oral two times a day  insulin glargine Injectable (LANTUS) 13 Unit(s) SubCutaneous at bedtime  insulin lispro (HumaLOG) corrective regimen sliding scale   SubCutaneous three times a day before meals  insulin lispro (HumaLOG) corrective regimen sliding scale   SubCutaneous at bedtime  insulin lispro Injectable (HumaLOG) 8 Unit(s) SubCutaneous three times a day before meals  magnesium oxide 400 milliGRAM(s) Oral every 12 hours  multivitamin 1 Tablet(s) Oral daily  mycophenolate mofetil 750 milliGRAM(s) Oral two times a day  nystatin    Suspension 061821 Unit(s) Oral every 6 hours  predniSONE   Tablet 7.5 milliGRAM(s) Oral two times a day  silver sulfADIAZINE 1% Cream 1 Application(s) Topical two times a day  sodium bicarbonate 1300 milliGRAM(s) Oral every 8 hours  sodium chloride 0.9% lock flush 3 milliLiter(s) IV Push every 8 hours  tacrolimus 6 milliGRAM(s) Oral two times a day  valGANciclovir 450 milliGRAM(s) Oral daily      PHYSICAL EXAM  GEN: No apparent distress, examined in   PSYCH: Appropriate, communicative, A+Ox3  NEURO: Non-focal,  A+Ox 3  CV: S1 S2 without rub or murmur  MEDIASTINUM: Sternal incision covered with dressing, CDI, stable  PACING WIRES:   VVI [  ]  setting:      Isolated/Insulated [  ]  DRAINS:  Tello [  ]  Drainage:         Pleural [  ]  Drainage:    PULMONARY:  CTA, Decreased left base   INCENTIVE SPIROMETRY:  ABDOMEN:  Soft, non-tender, non-distended, bowel sounds active x 4  LBM:  : voiding   VASCULAR: +pp +radial  SKIN: Warm, dry, intact   leg incision:  ACE [  ]  MUSCULOSKELETAL:  Moves all extremities equally  PHYSICAL THERAPY REC:  Home [  ]  Home w PT [   ]   JP [   ] Subjective:   Events overnight: None  CC: "Do you have any mints?"    VITAL SIGNS  T(F): 97.7  HR: 96  BP: 109/77  RR: 17  SpO2: 99% on room air    05-27-18 @ 07:01  -  05-28-18 @ 07:00  --------------------------------------------------------  OUT: 500 mL / NET: -500 mL    05-28-18 @ 07:01  -  05-29-18 @ 04:10  --------------------------------------------------------  OUT: 950 mL / NET: -950 mL    LAB: 5/29 pending    Tacrolimus (), Serum (05.28.18 @ 08:30): 11.1                 10.4   3.1   )-----------( 184      ( 28 May 2018 07:20 )             31.9     136  |  100  |  27<H>  ----------------------------<  154<H>  4.2   |  27  |  1.35<H>      CAPILLARY BLOOD GLUCOSE  POCT Blood Glucose.: 136 mg/dL (28 May 2018 22:16)  POCT Blood Glucose.: 154 mg/dL (28 May 2018 16:26)  POCT Blood Glucose.: 163 mg/dL (28 May 2018 11:29)  POCT Blood Glucose.: 171 mg/dL (28 May 2018 07:11)    DIAGNOSTICS   Xray Chest 1 View- PORTABLE-Urgent (05.23.18 @ 20:32)   Status post median sternotomy. Surgical clips overlying the mediastinum   and right upper chest.  Right lower lung patchy opacities, some of which partially obscure the   right heart border.The left lung is clear.  There is no pleural effusion.  There is no pneumothorax.  There is no acute abnormality of the visualized osseous structures.  IMPRESSION:  Right lower lung patchy opacities, some of which partially obscure the   right heart are likely due to atelectasis.    MEDICATIONS  aspirin enteric coated 81 milliGRAM(s) Oral daily  atorvastatin 10 milliGRAM(s) Oral at bedtime  atovaquone Suspension 1500 milliGRAM(s) Oral daily  calcium carbonate 1250 mG + Vitamin D (OsCal 500 + D) 2 Tablet(s) Oral two times a day  diltiazem    milliGRAM(s) Oral daily  enoxaparin Injectable 70 milliGRAM(s) SubCutaneous two times a day  famotidine    Tablet 20 milliGRAM(s) Oral two times a day  insulin glargine Injectable (LANTUS) 13 Unit(s) SubCutaneous at bedtime  insulin lispro (HumaLOG) corrective regimen sliding scale   SubCutaneous three times a day before meals  insulin lispro (HumaLOG) corrective regimen sliding scale   SubCutaneous at bedtime  insulin lispro Injectable (HumaLOG) 8 Unit(s) SubCutaneous three times a day before meals  magnesium oxide 400 milliGRAM(s) Oral every 12 hours  multivitamin 1 Tablet(s) Oral daily  mycophenolate mofetil 750 milliGRAM(s) Oral two times a day  nystatin    Suspension 332644 Unit(s) Oral every 6 hours  predniSONE   Tablet 7.5 milliGRAM(s) Oral two times a day  silver sulfADIAZINE 1% Cream 1 Application(s) Topical two times a day  sodium bicarbonate 1300 milliGRAM(s) Oral every 8 hours  sodium chloride 0.9% lock flush 3 milliLiter(s) IV Push every 8 hours  tacrolimus 6 milliGRAM(s) Oral two times a day  valGANciclovir 450 milliGRAM(s) Oral daily      PHYSICAL EXAM  GEN: No apparent distress, examined in bed, observed ambulating to bathroom with walker and assist  PSYCH: Appropriate, communicative, A+Ox3  NEURO: Non-focal,   CV: S1 S2 without rub or murmur  MEDIASTINUM: Sternal incision healed  PULMONARY: decreased R base  ABDOMEN:  Soft, non-tender, non-distended, bowel sounds active x 4  LBM:5/27  : voiding   VASCULAR: +2/4 pp +2/4 radial  SKIN: Warm, dry, intact. L arm with small hematoma. R finger with dry sterile dressing, darkened nail bed, followed by plastics  MUSCULOSKELETAL:  Moves all extremities equally, chair alarm in place due to recent fall  PHYSICAL THERAPY REC:  Home [ x]  Home w PT [   ]   JP [   ]

## 2018-05-29 NOTE — PROGRESS NOTE ADULT - PROBLEM SELECTOR PLAN 2
CK FK level Daily  Prograf 6mg bid   Continue with Cellcept to 750 mg PO bid.  Continue with prednisone 7.5 mg po BID

## 2018-05-29 NOTE — PROGRESS NOTE ADULT - SUBJECTIVE AND OBJECTIVE BOX
Diabetes Follow up note: Saw pt earlier today  Interval Hx: 67 y/o M w/h/o HTN/CHF/STV/Tony Fib>AICD/PAF/NICM>LVAD and more recently heart tx c/b DVT on Lovenox. Here with S&Sx of hyperglycemia and BG >900s likely due to steroid therapy and prograf. Pt now requiring insulin. Tolerating POs. Glycemic control mostly at goal of 100 to 180s. Had a BG >200s today ac lunch. No hypoglycemia. Per staff and pt he has been injecting insulin on and off under RN supervision. Pt doesn't wan to use abdomen since he is on Lovenox as well. Per RN who took care of pt last week, he was able to give return demonstration regarding use of insulin pen.    Review of Systems:  General: "I'm feeling much better", "I Can't remember you> I was very sick last week"  GI: Tolerating POs without any N/V/D/ABD PAIN.  CV: No CP/SOB  ENDO: No S&Sx of hypoglycemia      MEDS:  atorvastatin 10 milliGRAM(s) Oral at bedtime  insulin glargine Injectable (LANTUS) 13 Unit(s) SubCutaneous at bedtime  insulin lispro (HumaLOG) corrective regimen sliding scale   SubCutaneous three times a day before meals  insulin lispro (HumaLOG) corrective regimen sliding scale   SubCutaneous at bedtime  insulin lispro Injectable (HumaLOG) 8 Unit(s) SubCutaneous three times a day before meals  predniSONE   Tablet 7.5 milliGRAM(s) Oral two times a day  atovaquone Suspension 1500 milliGRAM(s) Oral daily  nystatin    Suspension [id number] Unit(s) Oral every 6 hours  tacrolimus 6 milliGRAM(s) Oral two times a day  valGANciclovir 450 milliGRAM(s) Oral daily    MEDICATIONS  (PRN):    Allergies    No Known Allergies        PE:  General: Male sitting in chair in NAD.   Vital Signs Last 24 Hrs  T(C): 36.6 (05-29-18 @ 15:00), Max: 37 (05-28-18 @ 19:30)  T(F): 97.8 (05-29-18 @ 15:00), Max: 98.6 (05-28-18 @ 19:30)  HR: 94 (05-29-18 @ 15:00) (91 - 101)  BP: 109/76 (05-29-18 @ 15:00) (102/69 - 111/71)  BP(mean): 86 (05-29-18 @ 15:00) (81 - 87)  RR: 18 (05-29-18 @ 15:00) (17 - 18)  SpO2: 99% (05-29-18 @ 15:00) (99% - 110%)  Chest: Midsternal incision healed  Abd: Soft, NT, ND  Extremities: Warm, no edema noted in all 4 exts  Neuro: A&O X3    LABS:  POCT Blood Glucose.: 275 mg/dL (05-29-18 @ 11:54)  POCT Blood Glucose.: 168 mg/dL (05-29-18 @ 07:12)  POCT Blood Glucose.: 136 mg/dL (05-28-18 @ 22:16)  POCT Blood Glucose.: 154 mg/dL (05-28-18 @ 16:26)  POCT Blood Glucose.: 163 mg/dL (05-28-18 @ 11:29)  POCT Blood Glucose.: 171 mg/dL (05-28-18 @ 07:11)  POCT Blood Glucose.: 189 mg/dL (05-27-18 @ 21:53)  POCT Blood Glucose.: 87 mg/dL (05-27-18 @ 16:51)                          9.8    3.41  )-----------( 236      ( 29 May 2018 07:51 )             29.6     05-29    135  |  98  |  30<H>  ----------------------------<  164<H>  4.2   |  27  |  1.59<H>    Ca    8.9      29 May 2018 07:24      Thyroid Function Tests:  05-24 @ 05:48 TSH 0.86 FreeT4 -- T3 61 Anti TPO -- Anti Thyroglobulin Ab -- TSI --      Hemoglobin A1C, Whole Blood: 10.4 % <H> [4.0 - 5.6] (05-23-18 @ 20:06)  Hemoglobin A1C, Whole Blood: 5.3 % [4.0 - 5.6] (03-18-18 @ 11:20)

## 2018-05-29 NOTE — PROGRESS NOTE ADULT - SUBJECTIVE AND OBJECTIVE BOX
INFECTIOUS DISEASES FOLLOW UP-- Sujey Lujan  [phone]    This is a follow up note for this  68yMale with  Hyperglycemia  ID following for r/o infection  interval events- development of a few scattered hemorrhagic like skin blisters of varying sizes      ROS:  CONSTITUTIONAL:  No fever, good appetite  CARDIOVASCULAR:  No chest pain or palpitations  RESPIRATORY:  No dyspnea  GASTROINTESTINAL:  No nausea, vomiting, diarrhea, or abdominal pain  GENITOURINARY:  No dysuria  NEUROLOGIC:  No headache,     Allergies    No Known Allergies    Intolerances        ANTIBIOTICS/RELEVANT:  antimicrobials  atovaquone Suspension 1500 milliGRAM(s) Oral daily  nystatin    Suspension [id number] Unit(s) Oral every 6 hours  valGANciclovir 450 milliGRAM(s) Oral daily    immunologic:  mycophenolate mofetil 750 milliGRAM(s) Oral two times a day  tacrolimus 6 milliGRAM(s) Oral two times a day    OTHER:  aspirin enteric coated 81 milliGRAM(s) Oral daily  atorvastatin 10 milliGRAM(s) Oral at bedtime  calcium carbonate 1250 mG + Vitamin D (OsCal 500 + D) 2 Tablet(s) Oral two times a day  diltiazem    milliGRAM(s) Oral daily  enoxaparin Injectable 70 milliGRAM(s) SubCutaneous two times a day  famotidine    Tablet 20 milliGRAM(s) Oral two times a day  insulin glargine Injectable (LANTUS) 13 Unit(s) SubCutaneous at bedtime  insulin lispro (HumaLOG) corrective regimen sliding scale   SubCutaneous three times a day before meals  insulin lispro (HumaLOG) corrective regimen sliding scale   SubCutaneous at bedtime  insulin lispro Injectable (HumaLOG) 8 Unit(s) SubCutaneous three times a day before meals  magnesium oxide 400 milliGRAM(s) Oral every 12 hours  mavyret 100mg/40mg  tablet 3 Tablet(s) 3 Tablet(s) Oral daily  multivitamin 1 Tablet(s) Oral daily  predniSONE   Tablet 7.5 milliGRAM(s) Oral two times a day  silver sulfADIAZINE 1% Cream 1 Application(s) Topical two times a day  sodium bicarbonate 1300 milliGRAM(s) Oral every 8 hours  sodium chloride 0.9% lock flush 3 milliLiter(s) IV Push every 8 hours      Objective:  Vital Signs Last 24 Hrs  T(C): 36.6 (29 May 2018 15:00), Max: 36.9 (29 May 2018 03:15)  T(F): 97.8 (29 May 2018 15:00), Max: 98.4 (29 May 2018 03:15)  HR: 94 (29 May 2018 15:00) (91 - 100)  BP: 109/76 (29 May 2018 15:00) (103/77 - 111/71)  BP(mean): 86 (29 May 2018 15:00) (86 - 87)  RR: 18 (29 May 2018 15:00) (17 - 18)  SpO2: 99% (29 May 2018 15:00) (99% - 110%)    PHYSICAL EXAM:  Constitutional:no acute distress  Eyes:WALT, EOMI  Ear/Nose/Throat: no oral lesions, 	  Respiratory: clear BL  Cardiovascular: S1S2  Gastrointestinal:soft, (+) BS, no tenderness  Extremities:no e/e/c  No Lymphadenopathy  IV sites not inflammed.    LABS:                        9.8    3.41  )-----------( 236      ( 29 May 2018 07:51 )             29.6     05-29    135  |  98  |  30<H>  ----------------------------<  164<H>  4.2   |  27  |  1.59<H>    Ca    8.9      29 May 2018 07:24            MICROBIOLOGY:            RECENT CULTURES:  05-25 @ 17:59  .Blood Blood-Peripheral  --  --  --    No growth to date.  --  05-24 @ 13:31  .Urine Clean Catch (Midstream)  Klebsiella oxytoca  Klebsiella oxytoca  KAMILA    10,000 - 49,000 CFU/mL Klebsiella oxytoca  --  05-24 @ 11:31  .Blood Blood-Peripheral  --  --  --    No growth at 5 days.  --      RADIOLOGY & ADDITIONAL STUDIES:

## 2018-05-29 NOTE — PROGRESS NOTE ADULT - ASSESSMENT
68M s/p cardiac transplant 2/23/18 for NICM, on Tacrolimus, Cellcept and Prednisone 12.5mg BID, post op course complicated by ? graft rejection requiring plasmapheresis in April ,4/19-4/26/18. Admitted 5/23/18 with marked medication-induced hyperglycemia, now much improved blood sugars on insulin. Immune compromised but clinically well, no fevers, does not seem to have been provoked by an infection. Cultures in lab. Acquired HCV from donor, on treatment with Mavyret. CMV viral load negative 4/19, on Valgancyclovir prophylaxis. Blood culture is negative, urine culture with Kleb oxytoca    Recommend  -continue Mavyret for Hep C  -no additional antibiotics   -continue Mepron prophylaxis and Valganciclovir at current doses  -f/u blood and urine cultures in lab   -fu CMV viral load, HCV viral load  -monitor CBC, now with mild leukopenia ? related to Valcyte  -urine culture with Klebsiella, pt w/o symptoms likely represents asymptomatic bacteriuria, hence wouldn't not treat    few scattered hemorrhagic blister like lesions abdomen left forearm ? etiology- fluid sent for culutre, gram stain , fungal and AFB stains  would check KSHV viral load  culture for bacillary angiomatosis

## 2018-05-29 NOTE — PROGRESS NOTE ADULT - ASSESSMENT
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 Now presenting with hyperglycemia, glucose initally >700,  likely 2/2 to steroids vs. prograf.   Hospital course:  5/24: Weaned off Insulin gtt transitioned to basal bolus insulin. Glucometer/Insulin teaching initiated  5/25: Overnight: HR with low noted to be 75-80 with sleep; Hypotensive with SPB 91; Decrease Cardizem to 180 mg daily; Prograft level 17.5; Mild neutropenia WBC 3.4 Decrease valganciclovir 450 mg po daily and Cellcept to 750 mg PO BID. Maintain Tacrolimus 6 mg PO BID; Continue with Diabetic teaching. Endo following for hyperglycemia; Lantus increased to 13 units HA and pre meal increased to 8 units TID.  5/26 VVS; Continue with current medication regimen.  Monitor leukopenia WBC 3.1.  Awaiting HCV viral load. Hypotension SBP 90's, Norvasc and HCTZ D/C'd. Urine culture with Klebsiella oxytoca; patient asymptomatic per ID no abx warranted at this time--> continue to monitor.  Progaf level 15; continue with current dose. Supplement potassium 40 meq PO x 1 for K+ 3.8.    5/27: HCV viral load/CMV PCR pending.  s/p Fall from chair. Bed Alarm in place  5/28 VSS - no complaints - Prograf level = 11.1  continue Tacro@6 BID  5/29: HCV viral load/CMV PCR pending.  Peripheral IV replaced.   Disposition: Home once medically cleared

## 2018-05-29 NOTE — PROGRESS NOTE ADULT - PROBLEM SELECTOR PLAN 1
-Basal bolus dosing increased with Lantus 13 units q hs and Humalog 8 with meals   -Goal blood glucose 100-140  -Glucose monitoring AC and HS.   R/o Infection: Blood cultures negative  -Ck  HCV viral load: Results pending  CMV titer:  ab +  CMV PCR sent: Results pending

## 2018-05-29 NOTE — CHART NOTE - NSCHARTNOTEFT_GEN_A_CORE
Endocrine NP in to teach patient how to administer his own insulin  Pt. 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  As per Endocrine NP = pt. is not ready for home insulin at this time.

## 2018-05-30 ENCOUNTER — APPOINTMENT (OUTPATIENT)
Dept: HEPATOLOGY | Facility: CLINIC | Age: 68
End: 2018-05-30

## 2018-05-30 DIAGNOSIS — R23.8 OTHER SKIN CHANGES: ICD-10-CM

## 2018-05-30 LAB
ANION GAP SERPL CALC-SCNC: 12 MMOL/L — SIGNIFICANT CHANGE UP (ref 5–17)
BUN SERPL-MCNC: 23 MG/DL — SIGNIFICANT CHANGE UP (ref 7–23)
CALCIUM SERPL-MCNC: 8.9 MG/DL — SIGNIFICANT CHANGE UP (ref 8.4–10.5)
CHLORIDE SERPL-SCNC: 101 MMOL/L — SIGNIFICANT CHANGE UP (ref 96–108)
CO2 SERPL-SCNC: 27 MMOL/L — SIGNIFICANT CHANGE UP (ref 22–31)
CREAT SERPL-MCNC: 1.34 MG/DL — HIGH (ref 0.5–1.3)
CULTURE RESULTS: SIGNIFICANT CHANGE UP
GLUCOSE BLDC GLUCOMTR-MCNC: 114 MG/DL — HIGH (ref 70–99)
GLUCOSE BLDC GLUCOMTR-MCNC: 211 MG/DL — HIGH (ref 70–99)
GLUCOSE BLDC GLUCOMTR-MCNC: 264 MG/DL — HIGH (ref 70–99)
GLUCOSE BLDC GLUCOMTR-MCNC: 93 MG/DL — SIGNIFICANT CHANGE UP (ref 70–99)
GLUCOSE BLDC GLUCOMTR-MCNC: 99 MG/DL — SIGNIFICANT CHANGE UP (ref 70–99)
GLUCOSE SERPL-MCNC: 107 MG/DL — HIGH (ref 70–99)
HCT VFR BLD CALC: 28.4 % — LOW (ref 39–50)
HGB BLD-MCNC: 8.9 G/DL — LOW (ref 13–17)
MCHC RBC-ENTMCNC: 29.7 PG — SIGNIFICANT CHANGE UP (ref 27–34)
MCHC RBC-ENTMCNC: 31.2 GM/DL — LOW (ref 32–36)
MCV RBC AUTO: 94.9 FL — SIGNIFICANT CHANGE UP (ref 80–100)
PLATELET # BLD AUTO: 230 K/UL — SIGNIFICANT CHANGE UP (ref 150–400)
POTASSIUM SERPL-MCNC: 4.3 MMOL/L — SIGNIFICANT CHANGE UP (ref 3.5–5.3)
POTASSIUM SERPL-SCNC: 4.3 MMOL/L — SIGNIFICANT CHANGE UP (ref 3.5–5.3)
RBC # BLD: 2.99 M/UL — LOW (ref 4.2–5.8)
RBC # FLD: 19.8 % — HIGH (ref 10.3–14.5)
SODIUM SERPL-SCNC: 140 MMOL/L — SIGNIFICANT CHANGE UP (ref 135–145)
SPECIMEN SOURCE: SIGNIFICANT CHANGE UP
TACROLIMUS SERPL-MCNC: 9.5 NG/ML — SIGNIFICANT CHANGE UP
WBC # BLD: 2.8 K/UL — LOW (ref 3.8–10.5)
WBC # FLD AUTO: 2.8 K/UL — LOW (ref 3.8–10.5)

## 2018-05-30 PROCEDURE — 99223 1ST HOSP IP/OBS HIGH 75: CPT | Mod: GC

## 2018-05-30 PROCEDURE — 99232 SBSQ HOSP IP/OBS MODERATE 35: CPT

## 2018-05-30 PROCEDURE — 99233 SBSQ HOSP IP/OBS HIGH 50: CPT | Mod: GC

## 2018-05-30 RX ORDER — INSULIN LISPRO 100/ML
8 VIAL (ML) SUBCUTANEOUS
Qty: 0 | Refills: 0 | Status: DISCONTINUED | OUTPATIENT
Start: 2018-05-30 | End: 2018-06-03

## 2018-05-30 RX ORDER — TACROLIMUS 5 MG/1
6.5 CAPSULE ORAL
Qty: 0 | Refills: 0 | Status: DISCONTINUED | OUTPATIENT
Start: 2018-05-30 | End: 2018-06-01

## 2018-05-30 RX ORDER — VALGANCICLOVIR 450 MG/1
450 TABLET, FILM COATED ORAL EVERY 12 HOURS
Qty: 0 | Refills: 0 | Status: DISCONTINUED | OUTPATIENT
Start: 2018-05-30 | End: 2018-06-06

## 2018-05-30 RX ORDER — MYCOPHENOLATE MOFETIL 250 MG/1
500 CAPSULE ORAL
Qty: 0 | Refills: 0 | Status: DISCONTINUED | OUTPATIENT
Start: 2018-05-30 | End: 2018-06-06

## 2018-05-30 RX ORDER — INSULIN LISPRO 100/ML
10 VIAL (ML) SUBCUTANEOUS
Qty: 0 | Refills: 0 | Status: DISCONTINUED | OUTPATIENT
Start: 2018-05-30 | End: 2018-06-03

## 2018-05-30 RX ADMIN — Medication 8 UNIT(S): at 09:18

## 2018-05-30 RX ADMIN — Medication 1300 MILLIGRAM(S): at 15:24

## 2018-05-30 RX ADMIN — Medication 7.5 MILLIGRAM(S): at 18:02

## 2018-05-30 RX ADMIN — Medication 1 APPLICATION(S): at 06:19

## 2018-05-30 RX ADMIN — MAGNESIUM OXIDE 400 MG ORAL TABLET 400 MILLIGRAM(S): 241.3 TABLET ORAL at 06:17

## 2018-05-30 RX ADMIN — Medication 1 TABLET(S): at 12:05

## 2018-05-30 RX ADMIN — ENOXAPARIN SODIUM 70 MILLIGRAM(S): 100 INJECTION SUBCUTANEOUS at 17:59

## 2018-05-30 RX ADMIN — SODIUM CHLORIDE 3 MILLILITER(S): 9 INJECTION INTRAMUSCULAR; INTRAVENOUS; SUBCUTANEOUS at 22:00

## 2018-05-30 RX ADMIN — ATORVASTATIN CALCIUM 10 MILLIGRAM(S): 80 TABLET, FILM COATED ORAL at 21:28

## 2018-05-30 RX ADMIN — ATOVAQUONE 1500 MILLIGRAM(S): 750 SUSPENSION ORAL at 12:05

## 2018-05-30 RX ADMIN — TACROLIMUS 5.5 MILLIGRAM(S): 5 CAPSULE ORAL at 19:59

## 2018-05-30 RX ADMIN — Medication 2 TABLET(S): at 17:59

## 2018-05-30 RX ADMIN — FAMOTIDINE 20 MILLIGRAM(S): 10 INJECTION INTRAVENOUS at 06:17

## 2018-05-30 RX ADMIN — Medication 2 TABLET(S): at 06:17

## 2018-05-30 RX ADMIN — Medication 6: at 12:04

## 2018-05-30 RX ADMIN — Medication 180 MILLIGRAM(S): at 06:17

## 2018-05-30 RX ADMIN — SODIUM CHLORIDE 3 MILLILITER(S): 9 INJECTION INTRAMUSCULAR; INTRAVENOUS; SUBCUTANEOUS at 15:14

## 2018-05-30 RX ADMIN — MYCOPHENOLATE MOFETIL 500 MILLIGRAM(S): 250 CAPSULE ORAL at 19:59

## 2018-05-30 RX ADMIN — MAGNESIUM OXIDE 400 MG ORAL TABLET 400 MILLIGRAM(S): 241.3 TABLET ORAL at 17:59

## 2018-05-30 RX ADMIN — INSULIN GLARGINE 13 UNIT(S): 100 INJECTION, SOLUTION SUBCUTANEOUS at 22:12

## 2018-05-30 RX ADMIN — Medication 81 MILLIGRAM(S): at 12:05

## 2018-05-30 RX ADMIN — TACROLIMUS 6 MILLIGRAM(S): 5 CAPSULE ORAL at 07:58

## 2018-05-30 RX ADMIN — VALGANCICLOVIR 450 MILLIGRAM(S): 450 TABLET, FILM COATED ORAL at 17:58

## 2018-05-30 RX ADMIN — Medication 8 UNIT(S): at 12:05

## 2018-05-30 RX ADMIN — Medication 1 APPLICATION(S): at 17:58

## 2018-05-30 RX ADMIN — Medication 1300 MILLIGRAM(S): at 21:28

## 2018-05-30 RX ADMIN — Medication 7.5 MILLIGRAM(S): at 06:18

## 2018-05-30 RX ADMIN — MYCOPHENOLATE MOFETIL 750 MILLIGRAM(S): 250 CAPSULE ORAL at 07:58

## 2018-05-30 RX ADMIN — ENOXAPARIN SODIUM 70 MILLIGRAM(S): 100 INJECTION SUBCUTANEOUS at 06:18

## 2018-05-30 RX ADMIN — Medication 1300 MILLIGRAM(S): at 06:20

## 2018-05-30 RX ADMIN — Medication 500000 UNIT(S): at 12:05

## 2018-05-30 RX ADMIN — Medication 500000 UNIT(S): at 18:00

## 2018-05-30 RX ADMIN — FAMOTIDINE 20 MILLIGRAM(S): 10 INJECTION INTRAVENOUS at 17:59

## 2018-05-30 RX ADMIN — Medication 8 UNIT(S): at 16:28

## 2018-05-30 RX ADMIN — Medication 500000 UNIT(S): at 23:11

## 2018-05-30 RX ADMIN — Medication 500000 UNIT(S): at 06:18

## 2018-05-30 RX ADMIN — SODIUM CHLORIDE 3 MILLILITER(S): 9 INJECTION INTRAMUSCULAR; INTRAVENOUS; SUBCUTANEOUS at 06:22

## 2018-05-30 NOTE — CHART NOTE - NSCHARTNOTEFT_GEN_A_CORE
Source: Patient [ x ]    Family [ ]     other [ x ] RN, Comprehensive chart review    Pt seen for nutrition follow up. Reports good appetite and eating >75% of meals. Denies GI distress. Pt now amenable to diet instruction on medical nutrition therapy for DM. Pt with no prior knowledge of consistent CHO diet guidelines, and unable to state CHO or protein foods. Reviewed CHO foods and serving sizes, discussed importance of consistent CHO intake throughout the day, consuming mixed meals/snacks (protein with CHO), and avoiding sugary beverages (soda, juice, sports drinks). Discussed limiting intake of cake, cookies, and ice cream- pt amenable to limiting sweets to 2 times/month. Also discussed importance of increased protein intake, states he is receiving meals on wheels and has a HHA who prepares breakfast and lunch for him. Discussed continuing to avoid salt, high sodium foods, and high potassium foods. At this point pt began to display negative body language and appeared disinterested in further instruction. Pt made aware RD remains available as needed.    Pt admitted with hyperglycemia, BC >600 possiblye steroid induced; resolving polyuria, polydipsia, and blurred vision as per chart. Noted insulin teaching has been initiated.    Diet : consistent CHO, low fiber no concentrated potassium       Admission Weight: 66.5kg  Current Weight: 69.3kg  Weight fluctuations noted. No peripheral edema noted at this time.     Pertinent Medications: MEDICATIONS  (STANDING):  aspirin enteric coated 81 milliGRAM(s) Oral daily  atorvastatin 10 milliGRAM(s) Oral at bedtime  atovaquone Suspension 1500 milliGRAM(s) Oral daily  calcium carbonate 1250 mG + Vitamin D (OsCal 500 + D) 2 Tablet(s) Oral two times a day  diltiazem    milliGRAM(s) Oral daily  enoxaparin Injectable 70 milliGRAM(s) SubCutaneous two times a day  famotidine    Tablet 20 milliGRAM(s) Oral two times a day  insulin glargine Injectable (LANTUS) 13 Unit(s) SubCutaneous at bedtime  insulin lispro (HumaLOG) corrective regimen sliding scale   SubCutaneous three times a day before meals  insulin lispro (HumaLOG) corrective regimen sliding scale   SubCutaneous at bedtime  insulin lispro Injectable (HumaLOG) 8 Unit(s) SubCutaneous three times a day before meals  magnesium oxide 400 milliGRAM(s) Oral every 12 hours  mavyret 100mg/40mg  tablet 3 Tablet(s) 3 Tablet(s) Oral daily  multivitamin 1 Tablet(s) Oral daily  mycophenolate mofetil 750 milliGRAM(s) Oral two times a day  nystatin    Suspension [id number] Unit(s) Oral every 6 hours  predniSONE   Tablet 7.5 milliGRAM(s) Oral two times a day  silver sulfADIAZINE 1% Cream 1 Application(s) Topical two times a day  sodium bicarbonate 1300 milliGRAM(s) Oral every 8 hours  sodium chloride 0.9% lock flush 3 milliLiter(s) IV Push every 8 hours  tacrolimus 6 milliGRAM(s) Oral two times a day  valGANciclovir 450 milliGRAM(s) Oral daily    MEDICATIONS  (PRN):    Pertinent Labs:    Complete Blood Count (05.30.18 @ 07:43)    Hemoglobin: 8.9 g/dL - low    Hematocrit: 28.4 % - low  Basic Metabolic Panel (05.30.18 @ 07:43)    Sodium, Serum: 140 mmol/L     Potassium, Serum: 4.3 mmol/L    Chloride, Serum: 101 mmol/L    Carbon Dioxide, Serum: 27 mmol/L    Anion Gap, Serum: 12 mmol/L    Blood Urea Nitrogen, Serum: 23 mg/dL    Creatinine, Serum: 1.34 mg/dL - high    Glucose, Serum: 107 mg/dL - high    Calcium, Total Serum: 8.9 mg/dL     Point of Care Testing BG: (5/30)114, (5/29)100-275.    Skin: No pressure ulcers noted.     Estimated Needs:   [ x ] no change since previous assessment  [ ] recalculated:       Previous Nutrition Diagnosis:   Altered nutrition - related laboratory values remains.  Unintended weight loss remains, being addressed with good PO intake.         New Nutrition Diagnosis: [ x ] not applicable      Interventions:   1. Provided extensive instruction on consistent CHO diet guidelines, pt has written materials at bedside from previous RD visit.  2. Reinforced limiting high sodium foods, salt, and high potassium foods.  3. Encourage pt to maintain good PO intake.  4. Provide food preferences within therapeutic diet when requested.        Monitoring and Evaluation:     [ x ] PO intake [ x ] Tolerance to diet prescription [ x ] weights [ x ] follow up per protocol    [ ] other:

## 2018-05-30 NOTE — PROGRESS NOTE ADULT - ASSESSMENT
67 year old man with ACC/AHA stage D chronic systolic heart failure due to NICM s/p HM2 LVAD 6/17 c/b pump thrombosis now s/p heart transplant on 2/23 (CMV D-/R+ and Toxo D-/R+), with post-op course c/b primary graft dysfunction, initially thought to be immune-mediated due to positive B-cell flow (negative CDC cross match) and received plasmapharesis/IVIg x2 with improvement in LV function. His course has been complicated by bilateral IJ/subclavian thrombi (on lovenox). Given persistent C4D staining and decline in LV function in April he was treated with plasmapharesis/IVIg and rituxan (received 2 doses, last 5/9) for suspected AMR (noted to have non-HLA Ab to angiotension II). Has since started Mavyret for HCV treatment. Was admitted for hyperglycemia, hyponatremia and acute kidney injury on 5/23 which has improved with insulin therapy. Noted to have papular lesions on his arms and a bullous lesion of the abdomen that were new. Labs also notable for continued leukopenia.     Immunosuppression  - Tacro level 9.5; increase to tacro 6.5mg/6.5mg PO (5/30) given improving renal function  - Continued leukopenia on cellcept 750 mg bid, reduce to 500 mg q12 PO  - on pred 7.5 q12; continue for now    Graft function   - EF 48-50% on 5/24 (unchanged from TTE 5/9)    CAV PPx  - on ASA 81 mg daily  - on lipitor 10 mg daily    Antimicrobial PPx  - on nystatin; ? will consider d/c'ing given >1 month out per protocol  - continue mepron 1500 mg daily  - on valcyte 450 mg daily increase to BID    Diabetes - new onset likely 2/2 steroids/prograf  - appreciate endo assistance; c/w home DM education  - BGs improved    Renal dysfunction   - improved  - continue monitoring for now  - on sodium bicarb for hyperkalemia which has resolved    Papular lesions  - Appear to have spontaneously occurred, does not appear to be related to trauma, unclear etiology  - Await culture results, would have derm evaluate    Please call me at [phone] for any further questions up till 5 pm. For after hours, please call the covering cardiology [place] [phone] for any further assistance.

## 2018-05-30 NOTE — PROGRESS NOTE ADULT - ASSESSMENT
68M s/p cardiac transplant 2/23/18 for NICM, on Tacrolimus, Cellcept and Prednisone 12.5mg BID, post op course complicated by ? graft rejection requiring plasmapheresis in April ,4/19-4/26/18. Admitted 5/23/18 with marked medication-induced hyperglycemia, now much improved blood sugars on insulin. Immune compromised but clinically well, no fevers, does not seem to have been provoked by an infection. Cultures in lab. Acquired HCV from donor, on treatment with Mavyret. CMV viral load negative 4/19, on Valgancyclovir prophylaxis. Blood culture is negative, urine culture with Kleb oxytoca    Recommend  -continue Mavyret for Hep C  -no additional antibiotics   -continue Mepron prophylaxis and Valganciclovir at current doses  -f/u blood and urine cultures in lab   -fu CMV viral load, HCV viral load  -monitor CBC, now with mild leukopenia ? related to Valcyte      few scattered hemorrhagic blister like lesions abdomen left forearm ? etiology- fluid sent for culutre, gram stain , fungal and AFB stains  gram stain, AFB stain and cultures  are NGTD  culture for bacillary angiomatosis- bartonella should grow negative to date      Gary Lujan MD  [phone]  After 5pm/weekends [phone]

## 2018-05-30 NOTE — PROGRESS NOTE ADULT - ASSESSMENT
67 y/o M w/h/o HTN/CHF/STV/Tony Fib>AICD/PAF/NICM>LVAD and more recently heart tx c/b DVT on Lovenox. Here with NODAT. Tolerating POs. Pt now requiring insulin and participating in education> feels overwhelmed with all the tasks he needs to learn.  Also hyperglycemic after breakfast for the past 2 days. Will increase Humalog with breakfast to correct  post breakfast hyperglycemia.  Reviewed with pt use of One Touch Glucose meter. Pt able to give return demonstration if prompted. Needs to continue practicing use of home meter and insulin pen. Reassured pt that the more he practices while in hospital the easier is going to be once he goes home. Also spoke to primary team to get family members involve in DM education in order to assist pt upon discharge. Pt states he has a brother that knows how to test BG levels and also assist him with Lovenox injections.  Spoke to staff to reinforce education as often as possible so pt feels more confident about his DM care.

## 2018-05-30 NOTE — CONSULT NOTE ADULT - PROBLEM SELECTOR RECOMMENDATION 9
Mix Mupirocin and Triamcinolone 0.1% ointment BID X 2 wks    f/u in Dermatology clinic if still symptomatic. Call to schedule. [phone]
Diabetes Education and Nutrition Eval  IVF hydration  Start Lantus 12 units qhs  Start Humalog 6 units qac  Mod correction scale qac + bedtime  Goal glucose 100-180  Outpt. endo follow-up  Outpt. optho, podiatry, nephrology  Discharge plan to be determined.  Needs education and pen teaching.
Hyperglycemia post transplant. Likely 2/2 to steroids vs >prograf  - Appreciate Endocrine recs  - started on basal bolus regimen of insulin  - will need teaching prior to discharge  - consistent carb diet.   - FS ac and qhs

## 2018-05-30 NOTE — CONSULT NOTE ADULT - SUBJECTIVE AND OBJECTIVE BOX
HPI:  67M with ACC/AHA stage D chronic systolic heart failure due to NICM s/p HM2 LVAD 6/17 c/b pump thrombosis now s/p heart transplant on 2/23 (on MMF, Prograf, Pred), with post-op course c/b primary graft dysfunction, initially thought to be immune-mediated due to positive B-cell flow (negative CDC cross match) and received plasmapheresis/IVIg x2 with improvement in LV function.  Admitted for elevated glucose and insulin injection teaching. Derm consulted for a blister on abdomen x 1 day. No tender or itch. No other blisters or mucosal involvement. No fever, chills, fatigue, malaise.      PAST MEDICAL & SURGICAL HISTORY:  DVT of upper extremity (deep vein thrombosis)  Hepatitis C virus  GIB (gastrointestinal bleeding)  Ventricular fibrillation: s/p AICD  PAF (paroxysmal atrial fibrillation): on xarelto  Non-Ischemic Cardiomyopathy  SVT (Supraventricular Tachycardia)  HTN  CHF (Congestive Heart Failure)  H/O heart transplant: 2/2018  LVAD (left ventricular assist device) present  Status post left hip replacement  History of Prior Ablation Treatment: for afib  AICD (Automatic Cardioverter/Defibrillator) Present: [employer] with 1 [employer] lead4/1/09- explanted and replaced with [employer] 2 leads on 9/2/09      REVIEW OF SYSTEMS      14 pt ROS neg except if noted above in HPI    MEDICATIONS  (STANDING):  aspirin enteric coated 81 milliGRAM(s) Oral daily  atorvastatin 10 milliGRAM(s) Oral at bedtime  atovaquone Suspension 1500 milliGRAM(s) Oral daily  calcium carbonate 1250 mG + Vitamin D (OsCal 500 + D) 2 Tablet(s) Oral two times a day  diltiazem    milliGRAM(s) Oral daily  enoxaparin Injectable 70 milliGRAM(s) SubCutaneous two times a day  famotidine    Tablet 20 milliGRAM(s) Oral two times a day  insulin glargine Injectable (LANTUS) 13 Unit(s) SubCutaneous at bedtime  insulin lispro (HumaLOG) corrective regimen sliding scale   SubCutaneous three times a day before meals  insulin lispro (HumaLOG) corrective regimen sliding scale   SubCutaneous at bedtime  insulin lispro Injectable (HumaLOG) 10 Unit(s) SubCutaneous before breakfast  insulin lispro Injectable (HumaLOG) 8 Unit(s) SubCutaneous before lunch  insulin lispro Injectable (HumaLOG) 8 Unit(s) SubCutaneous before dinner  magnesium oxide 400 milliGRAM(s) Oral every 12 hours  mavyret 100mg/40mg  tablet 3 Tablet(s) 3 Tablet(s) Oral daily  multivitamin 1 Tablet(s) Oral daily  mycophenolate mofetil 500 milliGRAM(s) Oral <User Schedule>  nystatin    Suspension 873524 Unit(s) Oral every 6 hours  predniSONE   Tablet 7.5 milliGRAM(s) Oral two times a day  silver sulfADIAZINE 1% Cream 1 Application(s) Topical two times a day  sodium bicarbonate 1300 milliGRAM(s) Oral every 8 hours  sodium chloride 0.9% lock flush 3 milliLiter(s) IV Push every 8 hours  tacrolimus 6.5 milliGRAM(s) Oral <User Schedule>  valGANciclovir 450 milliGRAM(s) Oral every 12 hours    MEDICATIONS  (PRN):      Allergies    No Known Allergies    Intolerances        SOCIAL HISTORY:    FAMILY HISTORY:  No pertinent family history in first degree relatives      Vital Signs Last 24 Hrs  T(C): 36.4 (30 May 2018 15:16), Max: 36.8 (29 May 2018 20:13)  T(F): 97.6 (30 May 2018 15:16), Max: 98.3 (29 May 2018 20:13)  HR: 96 (30 May 2018 15:16) (85 - 102)  BP: 96/67 (30 May 2018 15:16) (96/67 - 122/80)  BP(mean): 91 (30 May 2018 11:43) (78 - 91)  RR: 18 (30 May 2018 15:16) (17 - 18)  SpO2: 99% (30 May 2018 15:16) (99% - 100%)    PHYSICAL EXAM:     The patient was alert and oriented X 3, well nourished, and in no  apparent distress.  OP showed no ulcerations  There was no visible lymphadenopathy.  Conjunctiva were non injected  There was no clubbing or edema of extremities.  The scalp, hair, face, eyebrows, lips, OP, neck, chest, back,   extremities X 4, nails were examined.  There was no hyperhidrosis or bromhidrosis.    Of note on skin exam:     single tense bulla on abdomen, no surrounding erythema, drained joshua blood when punctured    LABS:                        8.9    2.8   )-----------( 230      ( 30 May 2018 07:43 )             28.4     05-30    140  |  101  |  23  ----------------------------<  107<H>  4.3   |  27  |  1.34<H>    Ca    8.9      30 May 2018 07:43            RADIOLOGY & ADDITIONAL STUDIES:

## 2018-05-30 NOTE — PROGRESS NOTE ADULT - ASSESSMENT
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 Now presenting with hyperglycemia, glucose initally >700,  likely 2/2 to steroids vs. prograf.   Hospital course:  5/24: Weaned off Insulin gtt transitioned to basal bolus insulin. Glucometer/Insulin teaching initiated  5/25: Overnight: HR with low noted to be 75-80 with sleep; Hypotensive with SPB 91; Decrease Cardizem to 180 mg daily; Prograft level 17.5; Mild neutropenia WBC 3.4 Decrease valganciclovir 450 mg po daily and Cellcept to 750 mg PO BID. Maintain Tacrolimus 6 mg PO BID; Continue with Diabetic teaching. Endo following for hyperglycemia; Lantus increased to 13 units HA and pre meal increased to 8 units TID.  5/26 VVS; Continue with current medication regimen.  Monitor leukopenia WBC 3.1.  Awaiting HCV viral load. Hypotension SBP 90's, Norvasc and HCTZ D/C'd. Urine culture with Klebsiella oxytoca; patient asymptomatic per ID no abx warranted at this time--> continue to monitor.  Progaf level 15; continue with current dose. Supplement potassium 40 meq PO x 1 for K+ 3.8.    5/27: HCV viral load/CMV PCR pending.  s/p Fall from chair. Bed Alarm in place  5/28 VSS - no complaints - Prograf level = 11.1  continue Tacro@6 BID  5/29: HCV viral load/CMV PCR pending.  Peripheral IV replaced.   Disposition: Home once medically cleared    5/30 cmv, hep c neg.  Pt w small hematoma on the surface of his abd. derm consulted, cultures sent  Insulin teaching in progress.  D/c planning

## 2018-05-30 NOTE — PROGRESS NOTE ADULT - PROBLEM SELECTOR PLAN 1
-test BG AC/HS. Please allow patient to practice testing finger sticks.   -Reinforce and practice proper use of insulin pen and glucose meter.  -Continue Lantus 13 units QHS  -Change Humalog  dose to 10-8-8  units AC meals   -c/w Humalog moderate correction scale AC and Low HS scale for now  -Pt got DM Rxs filled:    -Will need home care to reinforce education. Brother might be able to assist pt at home.  -Has f/u apt at endo clinic on 6/21/18 at 10:40am . [facility]  -Plan discussed with pt/team/staff.  Contact info: [phone] (24/7). pager [phone]

## 2018-05-30 NOTE — PROGRESS NOTE ADULT - SUBJECTIVE AND OBJECTIVE BOX
INFECTIOUS DISEASES FOLLOW UP-- Sujey Lujan  [phone]    This is a follow up note for this  68yMale with  Hyperglycemia  ID following to r/o infection causing the hyperglycemia  interval events- no new skin lesions identified      ROS:  CONSTITUTIONAL:  No fever, good appetite  CARDIOVASCULAR:  No chest pain or palpitations  RESPIRATORY:  No dyspnea  GASTROINTESTINAL:  No nausea, vomiting, diarrhea, or abdominal pain  GENITOURINARY:  No dysuria  NEUROLOGIC:  No headache,     Allergies    No Known Allergies    Intolerances        ANTIBIOTICS/RELEVANT:  antimicrobials  atovaquone Suspension 1500 milliGRAM(s) Oral daily  nystatin    Suspension 781206 Unit(s) Oral every 6 hours  valGANciclovir 450 milliGRAM(s) Oral every 12 hours    immunologic:  mycophenolate mofetil 500 milliGRAM(s) Oral <User Schedule>  tacrolimus 6.5 milliGRAM(s) Oral <User Schedule>    OTHER:  aspirin enteric coated 81 milliGRAM(s) Oral daily  atorvastatin 10 milliGRAM(s) Oral at bedtime  calcium carbonate 1250 mG + Vitamin D (OsCal 500 + D) 2 Tablet(s) Oral two times a day  diltiazem    milliGRAM(s) Oral daily  enoxaparin Injectable 70 milliGRAM(s) SubCutaneous two times a day  famotidine    Tablet 20 milliGRAM(s) Oral two times a day  insulin glargine Injectable (LANTUS) 13 Unit(s) SubCutaneous at bedtime  insulin lispro (HumaLOG) corrective regimen sliding scale   SubCutaneous three times a day before meals  insulin lispro (HumaLOG) corrective regimen sliding scale   SubCutaneous at bedtime  insulin lispro Injectable (HumaLOG) 10 Unit(s) SubCutaneous before breakfast  insulin lispro Injectable (HumaLOG) 8 Unit(s) SubCutaneous before lunch  insulin lispro Injectable (HumaLOG) 8 Unit(s) SubCutaneous before dinner  magnesium oxide 400 milliGRAM(s) Oral every 12 hours  mavyret 100mg/40mg  tablet 3 Tablet(s) 3 Tablet(s) Oral daily  multivitamin 1 Tablet(s) Oral daily  predniSONE   Tablet 7.5 milliGRAM(s) Oral two times a day  silver sulfADIAZINE 1% Cream 1 Application(s) Topical two times a day  sodium bicarbonate 1300 milliGRAM(s) Oral every 8 hours  sodium chloride 0.9% lock flush 3 milliLiter(s) IV Push every 8 hours      Objective:  Vital Signs Last 24 Hrs  T(C): 36.4 (30 May 2018 15:16), Max: 36.8 (29 May 2018 20:13)  T(F): 97.6 (30 May 2018 15:16), Max: 98.3 (29 May 2018 20:13)  HR: 96 (30 May 2018 15:16) (85 - 110)  BP: 96/67 (30 May 2018 15:16) (96/67 - 122/80)  BP(mean): 91 (30 May 2018 11:43) (78 - 91)  RR: 18 (30 May 2018 15:16) (17 - 18)  SpO2: 99% (30 May 2018 15:16) (99% - 100%)    PHYSICAL EXAM:  Constitutional:no acute distress  Eyes:WALT, EOMI  Ear/Nose/Throat: no oral lesions, 	  Respiratory: clear BL sternotomy healed  Cardiovascular: S1S2  Gastrointestinal:soft, (+) BS, no tenderness  abd. hem. blister remains  with additional spots on his right arm and left hand but no new lesions  Extremities:no e/e/c  No Lymphadenopathy  IV sites not inflammed.    LABS:                        8.9    2.8   )-----------( 230      ( 30 May 2018 07:43 )             28.4     05-30    140  |  101  |  23  ----------------------------<  107<H>  4.3   |  27  |  1.34<H>    Ca    8.9      30 May 2018 07:43            MICROBIOLOGY:  Culture Results:   No growth (05-29 @ 19:07)  Culture Results:   Testing in progress (05-29 @ 19:07)    AFB smear -            RECENT CULTURES:  05-29 @ 19:07  Skin Skin, abdomen  --  --  --    No growth  --  05-25 @ 17:59  .Blood Blood-Peripheral  --  --  --    No growth to date.  --  05-24 @ 13:31  .Urine Clean Catch (Midstream)  Klebsiella oxytoca  Klebsiella oxytoca  KAMILA    10,000 - 49,000 CFU/mL Klebsiella oxytoca  --  05-24 @ 11:31  .Blood Blood-Peripheral  --  --  --    No growth at 5 days.  --      RADIOLOGY & ADDITIONAL STUDIES:

## 2018-05-30 NOTE — PROGRESS NOTE ADULT - SUBJECTIVE AND OBJECTIVE BOX
Diabetes Follow up note: Saw pt earlier today  Interval Hx: 69 y/o M w/h/o HTN/CHF/STV/Tony Fib>AICD/PAF/NICM>LVAD and more recently heart tx c/b DVT on Lovenox. Here with NODAT requiring insulin. Tolerating POs. Glycemic control mostly at goal except for prandial hyperglycemia after breakfast for the past 2 days. No hypoglycemia. Per staff  pt having trouble learning to use One touch glucose meter. Pt injecting insulin under RN supervision. Has not practiced insulin pen use since yesterday because he was learning use of glucose meter. Pt somewhat overwhelmed by DM care needed.     Review of Systems:  General: "I have to learn too many things"  GI: Tolerating POs without any N/V/D/ABD PAIN.  CV: No CP/SOB  ENDO: No S&Sx of hypoglycemia      MEDS:  atorvastatin 10 milliGRAM(s) Oral at bedtime  insulin glargine Injectable (LANTUS) 13 Unit(s) SubCutaneous at bedtime  insulin lispro (HumaLOG) corrective regimen sliding scale   SubCutaneous three times a day before meals  insulin lispro (HumaLOG) corrective regimen sliding scale   SubCutaneous at bedtime  insulin lispro Injectable (HumaLOG) 8 Unit(s) SubCutaneous three times a day before meals  predniSONE   Tablet 7.5 milliGRAM(s) Oral two times a day  atovaquone Suspension 1500 milliGRAM(s) Oral daily  nystatin    Suspension [id number] Unit(s) Oral every 6 hours  tacrolimus 6.5 milliGRAM(s) Oral <User Schedule>  valGANciclovir 450 milliGRAM(s) Oral every 12 hours    Allergies    No Known Allergies    PE:  General: Male sitting at edge of bed in NAD.   Vital Signs Last 24 Hrs  T(C): 36.4 (05-30-18 @ 15:16), Max: 36.8 (05-29-18 @ 20:13)  T(F): 97.6 (05-30-18 @ 15:16), Max: 98.3 (05-29-18 @ 20:13)  HR: 96 (05-30-18 @ 15:16) (85 - 102)  BP: 96/67 (05-30-18 @ 15:16) (96/67 - 122/80)  BP(mean): 91 (05-30-18 @ 11:43) (78 - 91)  RR: 18 (05-30-18 @ 15:16) (17 - 18)  SpO2: 99% (05-30-18 @ 15:16) (99% - 100%)  Chest: Midsternal incision healed  Abd: Soft, NT, ND  Extremities: Warm, no edema noted in all 4 exts  Neuro: A&O X3    LABS:  POCT Blood Glucose.: 264 mg/dL (05-30-18 @ 11:44)  POCT Blood Glucose.: 211 mg/dL (05-30-18 @ 09:17)  POCT Blood Glucose.: 114 mg/dL (05-30-18 @ 07:30)  POCT Blood Glucose.: 100 mg/dL (05-29-18 @ 22:17)  POCT Blood Glucose.: 107 mg/dL (05-29-18 @ 16:31)  POCT Blood Glucose.: 275 mg/dL (05-29-18 @ 11:54)  POCT Blood Glucose.: 168 mg/dL (05-29-18 @ 07:12)  POCT Blood Glucose.: 136 mg/dL (05-28-18 @ 22:16)  POCT Blood Glucose.: 154 mg/dL (05-28-18 @ 16:26)                          8.9    2.8   )-----------( 230      ( 30 May 2018 07:43 )             28.4       05-30    140  |  101  |  23  ----------------------------<  107<H>  4.3   |  27  |  1.34<H>    Ca    8.9      30 May 2018 07:43      Thyroid Function Tests:  05-24 @ 05:48 TSH 0.86 FreeT4 -- T3 61 Anti TPO -- Anti Thyroglobulin Ab -- TSI --      Hemoglobin A1C, Whole Blood: 10.4 % <H> [4.0 - 5.6] (05-23-18 @ 20:06)  Hemoglobin A1C, Whole Blood: 5.3 % [4.0 - 5.6] (03-18-18 @ 11:20)

## 2018-05-30 NOTE — CONSULT NOTE ADULT - ASSESSMENT
Differential diagnosis includes hematoma 2/2 Lovenox injection (although pt denies injecting himself in the area) vs bullous impetigo (unlikely) vs other traumatic process Solitary bulla and scattered small non palp purpura  non-toxic patient    purpura may be 2/2 Lovenox injection (although pt denies injecting himself in the area)   solitary blister may represent bullous impetigo vs other traumatic process

## 2018-05-30 NOTE — PROGRESS NOTE ADULT - SUBJECTIVE AND OBJECTIVE BOX
Interval Events:    Feels well this morning. Learning how to perform home diabetes care.    ROS: Denies HA/dizziness/CP/SOB/PND/orthopnea/LE edema/abd pain    MEDICATIONS:  aspirin enteric coated 81 milliGRAM(s) Oral daily  diltiazem    milliGRAM(s) Oral daily  enoxaparin Injectable 70 milliGRAM(s) SubCutaneous two times a day  atovaquone Suspension 1500 milliGRAM(s) Oral daily  nystatin    Suspension 187713 Unit(s) Oral every 6 hours  valGANciclovir 450 milliGRAM(s) Oral every 12 hours  famotidine    Tablet 20 milliGRAM(s) Oral two times a day  atorvastatin 10 milliGRAM(s) Oral at bedtime  insulin glargine Injectable (LANTUS) 13 Unit(s) SubCutaneous at bedtime  insulin lispro (HumaLOG) corrective regimen sliding scale   SubCutaneous three times a day before meals  insulin lispro (HumaLOG) corrective regimen sliding scale   SubCutaneous at bedtime  insulin lispro Injectable (HumaLOG) 8 Unit(s) SubCutaneous three times a day before meals  predniSONE   Tablet 7.5 milliGRAM(s) Oral two times a day  calcium carbonate 1250 mG + Vitamin D (OsCal 500 + D) 2 Tablet(s) Oral two times a day  magnesium oxide 400 milliGRAM(s) Oral every 12 hours  multivitamin 1 Tablet(s) Oral daily  mycophenolate mofetil 500 milliGRAM(s) Oral <User Schedule>  silver sulfADIAZINE 1% Cream 1 Application(s) Topical two times a day  sodium bicarbonate 1300 milliGRAM(s) Oral every 8 hours  sodium chloride 0.9% lock flush 3 milliLiter(s) IV Push every 8 hours  tacrolimus 6.5 milliGRAM(s) Oral <User Schedule>      PHYSICAL EXAM:  T(C): 36.6 (05-30-18 @ 11:43), Max: 36.8 (05-29-18 @ 20:13)  HR: 98 (05-30-18 @ 11:43) (85 - 102)  BP: 115/77 (05-30-18 @ 11:43) (106/75 - 122/80)  RR: 18 (05-30-18 @ 11:43) (17 - 18)  SpO2: 99% (05-30-18 @ 11:43) (99% - 100%)  Wt(kg): --  I&O's Summary    29 May 2018 07:01  -  30 May 2018 07:00  --------------------------------------------------------  IN: 880 mL / OUT: 600 mL / NET: 280 mL    30 May 2018 07:01  -  30 May 2018 13:18  --------------------------------------------------------  IN: 680 mL / OUT: 300 mL / NET: 380 mL      Appearance: No Acute Distress  HEENT: Trachea midline  Cardiovascular: Normal S1 S2, RRR, JVP < 8cm  Respiratory: Clear to auscultation bilaterally  Gastrointestinal: +BS, Soft, Non-tender  Extremities: No cyanosis or edema  Skin: discrete papule on right hand and left forearm, single bullous lesion on abdomen with dried blood  Psychiatry: A & O x 3, Mood & affect appropriate    LABS:	 	    CBC Full  -  ( 30 May 2018 07:43 )  WBC Count : 2.8 K/uL  Hemoglobin : 8.9 g/dL  Hematocrit : 28.4 %  Platelet Count - Automated : 230 K/uL  Mean Cell Volume : 94.9 fl  Mean Cell Hemoglobin : 29.7 pg  Mean Cell Hemoglobin Concentration : 31.2 gm/dL      05-30    140  |  101  |  23  ----------------------------<  107<H>  4.3   |  27  |  1.34<H>  05-29    135  |  98  |  30<H>  ----------------------------<  164<H>  4.2   |  27  |  1.59<H>    Ca    8.9      30 May 2018 07:43  Ca    8.9      29 May 2018 07:24      Tacrolimus (), Serum: 9.5 ng/mL (05.30.18 @ 09:30)  Tacrolimus (), Serum: 11.7 ng/mL (05.29.18 @ 07:45)  Tacrolimus (), Serum: 11.1 ng/mL (05.28.18 @ 08:30)

## 2018-05-30 NOTE — CONSULT NOTE ADULT - PROBLEM SELECTOR PROBLEM 1
[place]
Diabetes mellitus due to underlying condition with hyperglycemia, without long-term current use of insulin
Hyperglycemia

## 2018-05-31 LAB
ALBUMIN SERPL ELPH-MCNC: 3.4 G/DL — SIGNIFICANT CHANGE UP (ref 3.3–5)
ALP SERPL-CCNC: 64 U/L — SIGNIFICANT CHANGE UP (ref 40–120)
ALT FLD-CCNC: 22 U/L — SIGNIFICANT CHANGE UP (ref 10–45)
ANION GAP SERPL CALC-SCNC: 12 MMOL/L — SIGNIFICANT CHANGE UP (ref 5–17)
APTT BLD: 36.5 SEC — SIGNIFICANT CHANGE UP (ref 27.5–37.4)
AST SERPL-CCNC: 22 U/L — SIGNIFICANT CHANGE UP (ref 10–40)
BASOPHILS # BLD AUTO: 0 K/UL — SIGNIFICANT CHANGE UP (ref 0–0.2)
BASOPHILS NFR BLD AUTO: 0 % — SIGNIFICANT CHANGE UP (ref 0–2)
BILIRUB SERPL-MCNC: 0.8 MG/DL — SIGNIFICANT CHANGE UP (ref 0.2–1.2)
BUN SERPL-MCNC: 22 MG/DL — SIGNIFICANT CHANGE UP (ref 7–23)
CALCIUM SERPL-MCNC: 8.9 MG/DL — SIGNIFICANT CHANGE UP (ref 8.4–10.5)
CHLORIDE SERPL-SCNC: 102 MMOL/L — SIGNIFICANT CHANGE UP (ref 96–108)
CO2 SERPL-SCNC: 25 MMOL/L — SIGNIFICANT CHANGE UP (ref 22–31)
CREAT SERPL-MCNC: 1.34 MG/DL — HIGH (ref 0.5–1.3)
CULTURE RESULTS: SIGNIFICANT CHANGE UP
EOSINOPHIL # BLD AUTO: 0 K/UL — SIGNIFICANT CHANGE UP (ref 0–0.5)
EOSINOPHIL NFR BLD AUTO: 0.9 % — SIGNIFICANT CHANGE UP (ref 0–6)
GLUCOSE BLDC GLUCOMTR-MCNC: 100 MG/DL — HIGH (ref 70–99)
GLUCOSE BLDC GLUCOMTR-MCNC: 124 MG/DL — HIGH (ref 70–99)
GLUCOSE BLDC GLUCOMTR-MCNC: 168 MG/DL — HIGH (ref 70–99)
GLUCOSE BLDC GLUCOMTR-MCNC: 97 MG/DL — SIGNIFICANT CHANGE UP (ref 70–99)
GLUCOSE SERPL-MCNC: 103 MG/DL — HIGH (ref 70–99)
HCT VFR BLD CALC: 26.6 % — LOW (ref 39–50)
HCT VFR BLD CALC: 30.5 % — LOW (ref 39–50)
HCV RNA SERPL QL NAA+PROBE: SIGNIFICANT CHANGE UP
HGB BLD-MCNC: 8.6 G/DL — LOW (ref 13–17)
HGB BLD-MCNC: 9.5 G/DL — LOW (ref 13–17)
INR BLD: 1.04 RATIO — SIGNIFICANT CHANGE UP (ref 0.88–1.16)
LYMPHOCYTES # BLD AUTO: 0.5 K/UL — LOW (ref 1–3.3)
LYMPHOCYTES # BLD AUTO: 16.7 % — SIGNIFICANT CHANGE UP (ref 13–44)
MCHC RBC-ENTMCNC: 30 PG — SIGNIFICANT CHANGE UP (ref 27–34)
MCHC RBC-ENTMCNC: 31 PG — SIGNIFICANT CHANGE UP (ref 27–34)
MCHC RBC-ENTMCNC: 31.2 GM/DL — LOW (ref 32–36)
MCHC RBC-ENTMCNC: 32.5 GM/DL — SIGNIFICANT CHANGE UP (ref 32–36)
MCV RBC AUTO: 95.5 FL — SIGNIFICANT CHANGE UP (ref 80–100)
MCV RBC AUTO: 96.3 FL — SIGNIFICANT CHANGE UP (ref 80–100)
MONOCYTES # BLD AUTO: 0.9 K/UL — SIGNIFICANT CHANGE UP (ref 0–0.9)
MONOCYTES NFR BLD AUTO: 30.7 % — HIGH (ref 2–14)
NEUTROPHILS # BLD AUTO: 1.5 K/UL — LOW (ref 1.8–7.4)
NEUTROPHILS NFR BLD AUTO: 51.8 % — SIGNIFICANT CHANGE UP (ref 43–77)
PLATELET # BLD AUTO: 276 K/UL — SIGNIFICANT CHANGE UP (ref 150–400)
PLATELET # BLD AUTO: 280 K/UL — SIGNIFICANT CHANGE UP (ref 150–400)
POTASSIUM SERPL-MCNC: 4.1 MMOL/L — SIGNIFICANT CHANGE UP (ref 3.5–5.3)
POTASSIUM SERPL-SCNC: 4.1 MMOL/L — SIGNIFICANT CHANGE UP (ref 3.5–5.3)
PROT SERPL-MCNC: 6.2 G/DL — SIGNIFICANT CHANGE UP (ref 6–8.3)
PROTHROM AB SERPL-ACNC: 11.3 SEC — SIGNIFICANT CHANGE UP (ref 9.8–12.7)
RBC # BLD: 2.78 M/UL — LOW (ref 4.2–5.8)
RBC # BLD: 3.17 M/UL — LOW (ref 4.2–5.8)
RBC # FLD: 19.7 % — HIGH (ref 10.3–14.5)
RBC # FLD: 20.1 % — HIGH (ref 10.3–14.5)
SODIUM SERPL-SCNC: 139 MMOL/L — SIGNIFICANT CHANGE UP (ref 135–145)
SPECIMEN SOURCE: SIGNIFICANT CHANGE UP
TACROLIMUS SERPL-MCNC: 11 NG/ML — SIGNIFICANT CHANGE UP
WBC # BLD: 2.8 K/UL — LOW (ref 3.8–10.5)
WBC # BLD: 3.2 K/UL — LOW (ref 3.8–10.5)
WBC # FLD AUTO: 2.8 K/UL — LOW (ref 3.8–10.5)
WBC # FLD AUTO: 3.2 K/UL — LOW (ref 3.8–10.5)

## 2018-05-31 PROCEDURE — 99232 SBSQ HOSP IP/OBS MODERATE 35: CPT

## 2018-05-31 PROCEDURE — 99233 SBSQ HOSP IP/OBS HIGH 50: CPT | Mod: GC

## 2018-05-31 RX ORDER — INSULIN LISPRO 100/ML
VIAL (ML) SUBCUTANEOUS
Qty: 0 | Refills: 0 | Status: DISCONTINUED | OUTPATIENT
Start: 2018-05-31 | End: 2018-06-06

## 2018-05-31 RX ADMIN — FAMOTIDINE 20 MILLIGRAM(S): 10 INJECTION INTRAVENOUS at 17:24

## 2018-05-31 RX ADMIN — Medication 10 UNIT(S): at 07:54

## 2018-05-31 RX ADMIN — VALGANCICLOVIR 450 MILLIGRAM(S): 450 TABLET, FILM COATED ORAL at 06:17

## 2018-05-31 RX ADMIN — SODIUM CHLORIDE 3 MILLILITER(S): 9 INJECTION INTRAMUSCULAR; INTRAVENOUS; SUBCUTANEOUS at 06:09

## 2018-05-31 RX ADMIN — ENOXAPARIN SODIUM 70 MILLIGRAM(S): 100 INJECTION SUBCUTANEOUS at 06:16

## 2018-05-31 RX ADMIN — ATORVASTATIN CALCIUM 10 MILLIGRAM(S): 80 TABLET, FILM COATED ORAL at 21:16

## 2018-05-31 RX ADMIN — Medication 2 TABLET(S): at 07:54

## 2018-05-31 RX ADMIN — Medication 500000 UNIT(S): at 06:16

## 2018-05-31 RX ADMIN — Medication 1300 MILLIGRAM(S): at 06:16

## 2018-05-31 RX ADMIN — Medication 180 MILLIGRAM(S): at 06:17

## 2018-05-31 RX ADMIN — Medication 500000 UNIT(S): at 12:18

## 2018-05-31 RX ADMIN — TACROLIMUS 6.5 MILLIGRAM(S): 5 CAPSULE ORAL at 20:01

## 2018-05-31 RX ADMIN — Medication 7.5 MILLIGRAM(S): at 17:23

## 2018-05-31 RX ADMIN — Medication 1 APPLICATION(S): at 06:17

## 2018-05-31 RX ADMIN — INSULIN GLARGINE 13 UNIT(S): 100 INJECTION, SOLUTION SUBCUTANEOUS at 21:19

## 2018-05-31 RX ADMIN — Medication 81 MILLIGRAM(S): at 12:17

## 2018-05-31 RX ADMIN — Medication 8 UNIT(S): at 12:18

## 2018-05-31 RX ADMIN — Medication 500000 UNIT(S): at 17:24

## 2018-05-31 RX ADMIN — ATOVAQUONE 1500 MILLIGRAM(S): 750 SUSPENSION ORAL at 12:17

## 2018-05-31 RX ADMIN — TACROLIMUS 6.5 MILLIGRAM(S): 5 CAPSULE ORAL at 07:55

## 2018-05-31 RX ADMIN — Medication 2 TABLET(S): at 17:24

## 2018-05-31 RX ADMIN — Medication 7.5 MILLIGRAM(S): at 06:17

## 2018-05-31 RX ADMIN — MAGNESIUM OXIDE 400 MG ORAL TABLET 400 MILLIGRAM(S): 241.3 TABLET ORAL at 06:17

## 2018-05-31 RX ADMIN — FAMOTIDINE 20 MILLIGRAM(S): 10 INJECTION INTRAVENOUS at 06:17

## 2018-05-31 RX ADMIN — Medication 500000 UNIT(S): at 23:36

## 2018-05-31 RX ADMIN — Medication 8 UNIT(S): at 17:22

## 2018-05-31 RX ADMIN — VALGANCICLOVIR 450 MILLIGRAM(S): 450 TABLET, FILM COATED ORAL at 17:23

## 2018-05-31 RX ADMIN — Medication 1300 MILLIGRAM(S): at 21:15

## 2018-05-31 RX ADMIN — Medication 2: at 12:16

## 2018-05-31 RX ADMIN — SODIUM CHLORIDE 3 MILLILITER(S): 9 INJECTION INTRAMUSCULAR; INTRAVENOUS; SUBCUTANEOUS at 22:00

## 2018-05-31 RX ADMIN — MYCOPHENOLATE MOFETIL 500 MILLIGRAM(S): 250 CAPSULE ORAL at 07:54

## 2018-05-31 RX ADMIN — Medication 1300 MILLIGRAM(S): at 14:49

## 2018-05-31 RX ADMIN — SODIUM CHLORIDE 3 MILLILITER(S): 9 INJECTION INTRAMUSCULAR; INTRAVENOUS; SUBCUTANEOUS at 14:42

## 2018-05-31 RX ADMIN — MAGNESIUM OXIDE 400 MG ORAL TABLET 400 MILLIGRAM(S): 241.3 TABLET ORAL at 17:24

## 2018-05-31 RX ADMIN — ENOXAPARIN SODIUM 70 MILLIGRAM(S): 100 INJECTION SUBCUTANEOUS at 17:24

## 2018-05-31 RX ADMIN — MYCOPHENOLATE MOFETIL 500 MILLIGRAM(S): 250 CAPSULE ORAL at 20:01

## 2018-05-31 RX ADMIN — Medication 1 APPLICATION(S): at 17:24

## 2018-05-31 RX ADMIN — Medication 1 TABLET(S): at 12:17

## 2018-05-31 NOTE — PROGRESS NOTE ADULT - ASSESSMENT
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 Now presenting with hyperglycemia, glucose initally >700,  likely 2/2 to steroids vs. prograf.   Hospital course:  5/24: Weaned off Insulin gtt transitioned to basal bolus insulin. Glucometer/Insulin teaching initiated  5/25: Overnight: HR with low noted to be 75-80 with sleep; Hypotensive with SPB 91; Decrease Cardizem to 180 mg daily; Prograft level 17.5; Mild neutropenia WBC 3.4 Decrease valganciclovir 450 mg po daily and Cellcept to 750 mg PO BID. Maintain Tacrolimus 6 mg PO BID; Continue with Diabetic teaching. Endo following for hyperglycemia; Lantus increased to 13 units HA and pre meal increased to 8 units TID.  5/26 VVS; Continue with current medication regimen.  Monitor leukopenia WBC 3.1.  Awaiting HCV viral load. Hypotension SBP 90's, Norvasc and HCTZ D/C'd. Urine culture with Klebsiella oxytoca; patient asymptomatic per ID no abx warranted at this time--> continue to monitor.  Progaf level 15; continue with current dose. Supplement potassium 40 meq PO x 1 for K+ 3.8.    5/27: HCV viral load/CMV PCR pending.  s/p Fall from chair. Bed Alarm in place  5/28 VSS - no complaints - Prograf level = 11.1  continue Tacro@6 BID  5/29: HCV viral load/CMV PCR pending.  Peripheral IV replaced.   Disposition: Home once medically cleared    5/30 cmv, hep c neg.  Pt w small hematoma on the surface of his abd. derm consulted, cultures sent  Insulin teaching in progress.  5/31/18-D/c planning ? home friday

## 2018-05-31 NOTE — PROGRESS NOTE ADULT - ASSESSMENT
67 y/o M w/h/o HTN/CHF/STV/Tony Fib>AICD/PAF/NICM>LVAD and more recently heart tx c/b DVT on Lovenox. Here with NODAT. Tolerating POs. Pt reports actively participating in diabetes self-care and feels confident now he will be able to self-manage at home. Glucometer and insulin pens at bedtime. BG goal (100-180mg/dl).

## 2018-05-31 NOTE — PROGRESS NOTE ADULT - SUBJECTIVE AND OBJECTIVE BOX
Interval Events:    Continues to practice diabetes self care. Reaccumulation of blood in abdominal bulla.    ROS: Denies HA/dizziness/CP/SOB/PND/orthopnea/LE edema/abd pain    MEDICATIONS:  aspirin enteric coated 81 milliGRAM(s) Oral daily  diltiazem    milliGRAM(s) Oral daily  enoxaparin Injectable 70 milliGRAM(s) SubCutaneous two times a day  atovaquone Suspension 1500 milliGRAM(s) Oral daily  nystatin    Suspension 091251 Unit(s) Oral every 6 hours  valGANciclovir 450 milliGRAM(s) Oral every 12 hours  famotidine    Tablet 20 milliGRAM(s) Oral two times a day  atorvastatin 10 milliGRAM(s) Oral at bedtime  insulin glargine Injectable (LANTUS) 13 Unit(s) SubCutaneous at bedtime  insulin lispro (HumaLOG) corrective regimen sliding scale   SubCutaneous at bedtime  insulin lispro (HumaLOG) corrective regimen sliding scale   SubCutaneous three times a day before meals  insulin lispro Injectable (HumaLOG) 10 Unit(s) SubCutaneous before breakfast  insulin lispro Injectable (HumaLOG) 8 Unit(s) SubCutaneous before lunch  insulin lispro Injectable (HumaLOG) 8 Unit(s) SubCutaneous before dinner  predniSONE   Tablet 7.5 milliGRAM(s) Oral two times a day  calcium carbonate 1250 mG + Vitamin D (OsCal 500 + D) 2 Tablet(s) Oral two times a day  magnesium oxide 400 milliGRAM(s) Oral every 12 hours  multivitamin 1 Tablet(s) Oral daily  mycophenolate mofetil 500 milliGRAM(s) Oral <User Schedule>  silver sulfADIAZINE 1% Cream 1 Application(s) Topical two times a day  sodium bicarbonate 1300 milliGRAM(s) Oral every 8 hours  sodium chloride 0.9% lock flush 3 milliLiter(s) IV Push every 8 hours  tacrolimus 6.5 milliGRAM(s) Oral <User Schedule>      PHYSICAL EXAM:  T(C): 36.8 (05-31-18 @ 16:25), Max: 36.9 (05-30-18 @ 23:27)  HR: 99 (05-31-18 @ 16:25) (90 - 104)  BP: 103/65 (05-31-18 @ 16:25) (98/71 - 115/74)  RR: 18 (05-31-18 @ 16:25) (18 - 18)  SpO2: 98% (05-31-18 @ 16:25) (95% - 100%)  Wt(kg): --  I&O's Summary    30 May 2018 07:01  -  31 May 2018 07:00  --------------------------------------------------------  IN: 1080 mL / OUT: 1300 mL / NET: -220 mL    31 May 2018 07:01  -  31 May 2018 17:52  --------------------------------------------------------  IN: 480 mL / OUT: 500 mL / NET: -20 mL      Appearance: No Acute Distress  HEENT: Trachea midline  Cardiovascular: Normal S1 S2, RRR, JVP < 8cm  Respiratory: Clear to auscultation bilaterally  Gastrointestinal: +BS, Soft, Non-tender  Extremities: No clubbing, cyanosis or edema  Skin: dry papular lesions left forearm and right hand, single sanguinous bulla on abdomen  Psychiatry: A & O x 3, Mood & affect appropriate    LABS:	 	    CBC Full  -  ( 31 May 2018 16:19 )  WBC Count : 2.8 K/uL  Hemoglobin : 8.6 g/dL  Hematocrit : 26.6 %  Platelet Count - Automated : 280 K/uL  Mean Cell Volume : 95.5 fl  Mean Cell Hemoglobin : 31.0 pg  Mean Cell Hemoglobin Concentration : 32.5 gm/dL  Auto Neutrophil # : 1.5 K/uL  Auto Lymphocyte # : 0.5 K/uL  Auto Monocyte # : 0.9 K/uL  Auto Eosinophil # : 0.0 K/uL  Auto Basophil # : 0.0 K/uL  Auto Neutrophil % : 51.8 %  Auto Lymphocyte % : 16.7 %  Auto Monocyte % : 30.7 %  Auto Eosinophil % : 0.9 %  Auto Basophil % : 0.0 %    05-31    139  |  102  |  22  ----------------------------<  103<H>  4.1   |  25  |  1.34<H>  05-30    140  |  101  |  23  ----------------------------<  107<H>  4.3   |  27  |  1.34<H>    Ca    8.9      31 May 2018 07:19  Ca    8.9      30 May 2018 07:43    TPro  6.2  /  Alb  3.4  /  TBili  0.8  /  DBili  x   /  AST  22  /  ALT  22  /  AlkPhos  64  05-31    Tacrolimus (), Serum: 11.0 ng/mL (05.31.18 @ 07:49)  Tacrolimus (), Serum: 9.5  ng/mL (05.30.18 @ 09:30)  Tacrolimus (), Serum: 11.7 ng/mL (05.29.18 @ 07:45)

## 2018-05-31 NOTE — PROGRESS NOTE ADULT - PROBLEM SELECTOR PLAN 1
-test BG AC/HS. Allow patient to continue to practice self-testing glucose  -c/w Lantus 13 units QHS  -c/w Humalog 10-8-8 w/meals  -Adjust Humalog low correction scale (to avoid overcorrection) AC and Low HS scale  -Pt got DM Rxs filled:    -Will need home care to reinforce education. Brother might be able to assist pt at home.  -Has f/u apt at endo clinic on 6/21/18 at 10:40am . [address] [facility]  -Plan discussed with pt/team/staff.  pager: [phone]

## 2018-05-31 NOTE — PROGRESS NOTE ADULT - SUBJECTIVE AND OBJECTIVE BOX
VITAL SIGNS-Telemetry:    Vital Signs Last 24 Hrs  T(C): 36.7 (18 @ 11:22), Max: 36.9 (18 @ 23:27)  T(F): 98 (18 @ 11:22), Max: 98.4 (18 @ 23:27)  HR: 101 (18 @ 11:22) (90 - 110)  BP: 105/67 (18 @ 11:22) (96/67 - 115/74)  RR: 18 (18 @ 11:22) (18 - 18)  SpO2: 100% (18 @ 11:22) (95% - 100%)          @ 07:01  -   @ 07:00  --------------------------------------------------------  IN: 1080 mL / OUT: 1300 mL / NET: -220 mL     @ 07:01  -   @ 13:20  --------------------------------------------------------  IN: 120 mL / OUT: 500 mL / NET: -380 mL        Daily     Daily Weight in k.2 (31 May 2018 06:44)    CAPILLARY BLOOD GLUCOSE      POCT Blood Glucose.: 168 mg/dL (31 May 2018 12:00)  POCT Blood Glucose.: 124 mg/dL (31 May 2018 07:11)  POCT Blood Glucose.: 99 mg/dL (30 May 2018 21:38)  POCT Blood Glucose.: 93 mg/dL (30 May 2018 16:23)          Drains:     MS         [  ] Drainage:                 L Pleural  [  ]  Drainage:                R Pleural  [  ]  Drainage:  Pacing Wires        [  ]   Settings:                                  Isolated  [  ]  Coumadin    [ ] YES          [  ]      NO         Reason:       PHYSICAL EXAM:  Neurology: alert and oriented x 3, nonfocal, no gross deficits  CV : S1S2  Sternal Wound :  CDI , Stable  Lungs: CTA  Abdomen: soft, nontender, nondistended, positive bowel sounds, last bowel movement         Extremities:     no edema, no calf tenderness    aspirin enteric coated 81 milliGRAM(s) Oral daily  atorvastatin 10 milliGRAM(s) Oral at bedtime  atovaquone Suspension 1500 milliGRAM(s) Oral daily  calcium carbonate 1250 mG + Vitamin D (OsCal 500 + D) 2 Tablet(s) Oral two times a day  diltiazem    milliGRAM(s) Oral daily  enoxaparin Injectable 70 milliGRAM(s) SubCutaneous two times a day  famotidine    Tablet 20 milliGRAM(s) Oral two times a day  insulin glargine Injectable (LANTUS) 13 Unit(s) SubCutaneous at bedtime  insulin lispro Injectable (HumaLOG) 10 Unit(s) SubCutaneous before breakfast  insulin lispro Injectable (HumaLOG) 8 Unit(s) SubCutaneous before lunch  insulin lispro Injectable (HumaLOG) 8 Unit(s) SubCutaneous before dinner  magnesium oxide 400 milliGRAM(s) Oral every 12 hours  mavyret 100mg/40mg  tablet 3 Tablet(s) 3 Tablet(s) Oral daily  multivitamin 1 Tablet(s) Oral daily  mycophenolate mofetil 500 milliGRAM(s) Oral <User Schedule>  nystatin    Suspension 583052 Unit(s) Oral every 6 hours  predniSONE   Tablet 7.5 milliGRAM(s) Oral two times a day  silver sulfADIAZINE 1% Cream 1 Application(s) Topical two times a day  sodium bicarbonate 1300 milliGRAM(s) Oral every 8 hours  sodium chloride 0.9% lock flush 3 milliLiter(s) IV Push every 8 hours  tacrolimus 6.5 milliGRAM(s) Oral <User Schedule>  valGANciclovir 450 milliGRAM(s) Oral every 12 hours    Physical Therapy Rec:   Home  [ x ]   Home w/ PT  [  ]  Rehab  [  ]  Discussed with Cardiothoracic Team at AM rounds.

## 2018-05-31 NOTE — PROGRESS NOTE ADULT - SUBJECTIVE AND OBJECTIVE BOX
INFECTIOUS DISEASES FOLLOW UP-- Sujey Lujan  [phone]    This is a follow up note for this  68yMale with  Hyperglycemia, leukopenia  no interval events- feeling well      ROS:  CONSTITUTIONAL:  No fever, good appetite  CARDIOVASCULAR:  No chest pain or palpitations  RESPIRATORY:  No dyspnea  GASTROINTESTINAL:  No nausea, vomiting, diarrhea, or abdominal pain  GENITOURINARY:  No dysuria  NEUROLOGIC:  No headache,     Allergies    No Known Allergies    Intolerances        ANTIBIOTICS/RELEVANT:  antimicrobials  atovaquone Suspension 1500 milliGRAM(s) Oral daily  nystatin    Suspension 611790 Unit(s) Oral every 6 hours  valGANciclovir 450 milliGRAM(s) Oral every 12 hours    immunologic:  mycophenolate mofetil 500 milliGRAM(s) Oral <User Schedule>  tacrolimus 6.5 milliGRAM(s) Oral <User Schedule>    OTHER:  aspirin enteric coated 81 milliGRAM(s) Oral daily  atorvastatin 10 milliGRAM(s) Oral at bedtime  calcium carbonate 1250 mG + Vitamin D (OsCal 500 + D) 2 Tablet(s) Oral two times a day  diltiazem    milliGRAM(s) Oral daily  enoxaparin Injectable 70 milliGRAM(s) SubCutaneous two times a day  famotidine    Tablet 20 milliGRAM(s) Oral two times a day  insulin glargine Injectable (LANTUS) 13 Unit(s) SubCutaneous at bedtime  insulin lispro (HumaLOG) corrective regimen sliding scale   SubCutaneous three times a day before meals  insulin lispro (HumaLOG) corrective regimen sliding scale   SubCutaneous at bedtime  insulin lispro Injectable (HumaLOG) 10 Unit(s) SubCutaneous before breakfast  insulin lispro Injectable (HumaLOG) 8 Unit(s) SubCutaneous before lunch  insulin lispro Injectable (HumaLOG) 8 Unit(s) SubCutaneous before dinner  magnesium oxide 400 milliGRAM(s) Oral every 12 hours  mavyret 100mg/40mg  tablet 3 Tablet(s) 3 Tablet(s) Oral daily  multivitamin 1 Tablet(s) Oral daily  predniSONE   Tablet 7.5 milliGRAM(s) Oral two times a day  silver sulfADIAZINE 1% Cream 1 Application(s) Topical two times a day  sodium bicarbonate 1300 milliGRAM(s) Oral every 8 hours  sodium chloride 0.9% lock flush 3 milliLiter(s) IV Push every 8 hours      Objective:  Vital Signs Last 24 Hrs  T(C): 36.7 (31 May 2018 11:22), Max: 36.9 (30 May 2018 23:27)  T(F): 98 (31 May 2018 11:22), Max: 98.4 (30 May 2018 23:27)  HR: 101 (31 May 2018 11:22) (90 - 110)  BP: 105/67 (31 May 2018 11:22) (96/67 - 115/74)  BP(mean): --  RR: 18 (31 May 2018 11:22) (18 - 18)  SpO2: 100% (31 May 2018 11:22) (95% - 100%)    PHYSICAL EXAM:  Constitutional:no acute distress  Eyes:WALT, EOMI  Ear/Nose/Throat: no oral lesions, 	  Respiratory: clear BL  sternotomy site healed  Cardiovascular: S1S2  Gastrointestinal:soft, (+) BS, no tenderness  abdominal blister- has expanded with blood again  Extremities:no e/e/c two small vascular lesions one on right thumb one on left forearm no change in size  No Lymphadenopathy  IV sites not inflammed.    LABS:                        9.5    3.2   )-----------( 276      ( 31 May 2018 07:19 )             30.5     05-31    139  |  102  |  22  ----------------------------<  103<H>  4.1   |  25  |  1.34<H>    Ca    8.9      31 May 2018 07:19    TPro  6.2  /  Alb  3.4  /  TBili  0.8  /  DBili  x   /  AST  22  /  ALT  22  /  AlkPhos  64  05-31          MICROBIOLOGY:            RECENT CULTURES:  05-29 @ 19:07  Skin Skin, abdomen  --  --  --    No growth  --  05-25 @ 17:59  .Blood Blood-Peripheral  --  --  --    No growth at 5 days.  --  05-24 @ 13:31  .Urine Clean Catch (Midstream)  Klebsiella oxytoca  Klebsiella oxytoca  KAMILA    10,000 - 49,000 CFU/mL Klebsiella oxytoca  --      RADIOLOGY & ADDITIONAL STUDIES:

## 2018-05-31 NOTE — PROGRESS NOTE ADULT - SUBJECTIVE AND OBJECTIVE BOX
Diabetes Follow up note:  Interval Hx:  67 y/o M w/h/o HTN/CHF/STV/Tony Fib>AICD/PAF/NICM>LVAD and more recently heart tx c/b DVT on Lovenox. Here with NODAT requiring insulin. Tolerating POs. BG mostly at goal w/BG in 90's at dinner and bedtime last evening. Pt endorses is checking his own glucose and giving himself injections. Per team, may be discharged home tomorrow.     Review of Systems:  General: "I'm doing well"  GI: Tolerating POs without any N/V/D/ABD PAIN.  CV: No CP/SOB  ENDO: No S&Sx of hypoglycemia  MEDS:  atorvastatin 10 milliGRAM(s) Oral at bedtime  insulin glargine Injectable (LANTUS) 13 Unit(s) SubCutaneous at bedtime  insulin lispro (HumaLOG) corrective regimen sliding scale   SubCutaneous three times a day before meals  insulin lispro (HumaLOG) corrective regimen sliding scale   SubCutaneous at bedtime  insulin lispro Injectable (HumaLOG) 10 Unit(s) SubCutaneous before breakfast  insulin lispro Injectable (HumaLOG) 8 Unit(s) SubCutaneous before lunch  insulin lispro Injectable (HumaLOG) 8 Unit(s) SubCutaneous before dinner  predniSONE   Tablet 7.5 milliGRAM(s) Oral two times a day    atovaquone Suspension 1500 milliGRAM(s) Oral daily  nystatin    Suspension [id number] Unit(s) Oral every 6 hours  valGANciclovir 450 milliGRAM(s) Oral every 12 hours    Allergies    No Known Allergies      PE:  General: Male sitting in chair. NAD.   Vital Signs Last 24 Hrs  T(C): 36.7 (31 May 2018 11:22), Max: 36.9 (30 May 2018 23:27)  T(F): 98 (31 May 2018 11:22), Max: 98.4 (30 May 2018 23:27)  HR: 101 (31 May 2018 11:22) (90 - 110)  BP: 105/67 (31 May 2018 11:22) (96/67 - 115/74)  BP(mean): --  RR: 18 (31 May 2018 11:22) (18 - 18)  SpO2: 100% (31 May 2018 11:22) (95% - 100%)  Chest: Midsternal incision, healed  Abd: Soft, NT,ND,   Extremities: Warm  Neuro: A&O X3    LABS:    POCT Blood Glucose.: 168 mg/dL (05-31-18 @ 12:00)  POCT Blood Glucose.: 124 mg/dL (05-31-18 @ 07:11)  POCT Blood Glucose.: 99 mg/dL (05-30-18 @ 21:38)  POCT Blood Glucose.: 93 mg/dL (05-30-18 @ 16:23)  POCT Blood Glucose.: 264 mg/dL (05-30-18 @ 11:44)  POCT Blood Glucose.: 211 mg/dL (05-30-18 @ 09:17)  POCT Blood Glucose.: 114 mg/dL (05-30-18 @ 07:30)  POCT Blood Glucose.: 100 mg/dL (05-29-18 @ 22:17)  POCT Blood Glucose.: 107 mg/dL (05-29-18 @ 16:31)  POCT Blood Glucose.: 275 mg/dL (05-29-18 @ 11:54)  POCT Blood Glucose.: 168 mg/dL (05-29-18 @ 07:12)  POCT Blood Glucose.: 136 mg/dL (05-28-18 @ 22:16)  POCT Blood Glucose.: 154 mg/dL (05-28-18 @ 16:26)                            9.5    3.2   )-----------( 276      ( 31 May 2018 07:19 )             30.5       05-31    139  |  102  |  22  ----------------------------<  103<H>  4.1   |  25  |  1.34<H>    Ca    8.9      31 May 2018 07:19    TPro  6.2  /  Alb  3.4  /  TBili  0.8  /  DBili  x   /  AST  22  /  ALT  22  /  AlkPhos  64  05-31      Thyroid Function Tests:  05-24 @ 05:48 TSH 0.86 FreeT4 -- T3 61 Anti TPO -- Anti Thyroglobulin Ab -- TSI --      Hemoglobin A1C, Whole Blood: 10.4 % <H> [4.0 - 5.6] (05-23-18 @ 20:06)  Hemoglobin A1C, Whole Blood: 5.3 % [4.0 - 5.6] (03-18-18 @ 11:20)            Contact number: deanne [phone] or [phone]

## 2018-05-31 NOTE — PROGRESS NOTE ADULT - ASSESSMENT
68M s/p cardiac transplant 2/23/18 for NICM, on Tacrolimus, Cellcept and Prednisone 12.5mg BID, post op course complicated by ? graft rejection requiring plasmapheresis in April ,4/19-4/26/18. Admitted 5/23/18 with marked medication-induced hyperglycemia, now much improved blood sugars on insulin. Immune compromised but clinically well, no fevers, does not seem to have been provoked by an infection. Cultures in lab. Acquired HCV from donor, on treatment with Mavyret. CMV viral load negative 4/19, on Valgancyclovir prophylaxis. Blood culture is negative, urine culture with Kleb oxytoca    Recommend    -continue Mepron prophylaxis and Valganciclovir at current doses  -f/u blood and urine cultures in lab are no growth  -fu CMV viral load will send repeat, HCV viral load  -monitor CBC, now with mild leukopenia ? related to Valcyte      few scattered hemorrhagic blister like lesions abdomen left forearm ? etiology- fluid sent for culutre, gram stain , fungal and AFB stains  gram stain, AFB stain and cultures  are NGTD  culture for bacillary angiomatosis- bartonella should grow negative to date      Gary Lujan MD  [phone]  After 5pm/weekends [phone]

## 2018-06-01 ENCOUNTER — TRANSCRIPTION ENCOUNTER (OUTPATIENT)
Age: 68
End: 2018-06-01

## 2018-06-01 LAB
ALBUMIN SERPL ELPH-MCNC: 3.2 G/DL — LOW (ref 3.3–5)
ALP SERPL-CCNC: 58 U/L — SIGNIFICANT CHANGE UP (ref 40–120)
ALT FLD-CCNC: 25 U/L — SIGNIFICANT CHANGE UP (ref 10–45)
ANION GAP SERPL CALC-SCNC: 11 MMOL/L — SIGNIFICANT CHANGE UP (ref 5–17)
AST SERPL-CCNC: 23 U/L — SIGNIFICANT CHANGE UP (ref 10–40)
BILIRUB SERPL-MCNC: 0.9 MG/DL — SIGNIFICANT CHANGE UP (ref 0.2–1.2)
BUN SERPL-MCNC: 16 MG/DL — SIGNIFICANT CHANGE UP (ref 7–23)
CALCIUM SERPL-MCNC: 8.9 MG/DL — SIGNIFICANT CHANGE UP (ref 8.4–10.5)
CHLORIDE SERPL-SCNC: 103 MMOL/L — SIGNIFICANT CHANGE UP (ref 96–108)
CMV DNA CSF QL NAA+PROBE: SIGNIFICANT CHANGE UP
CMV DNA SPEC NAA+PROBE-LOG#: SIGNIFICANT CHANGE UP LOGIU/ML
CO2 SERPL-SCNC: 24 MMOL/L — SIGNIFICANT CHANGE UP (ref 22–31)
CREAT SERPL-MCNC: 1.13 MG/DL — SIGNIFICANT CHANGE UP (ref 0.5–1.3)
GLUCOSE BLDC GLUCOMTR-MCNC: 105 MG/DL — HIGH (ref 70–99)
GLUCOSE BLDC GLUCOMTR-MCNC: 131 MG/DL — HIGH (ref 70–99)
GLUCOSE BLDC GLUCOMTR-MCNC: 180 MG/DL — HIGH (ref 70–99)
GLUCOSE BLDC GLUCOMTR-MCNC: 263 MG/DL — HIGH (ref 70–99)
GLUCOSE SERPL-MCNC: 93 MG/DL — SIGNIFICANT CHANGE UP (ref 70–99)
HCT VFR BLD CALC: 28.2 % — LOW (ref 39–50)
HGB BLD-MCNC: 9 G/DL — LOW (ref 13–17)
HIV-1 VIRAL LOAD RESULT: SIGNIFICANT CHANGE UP
HIV1 RNA # SERPL NAA+PROBE: SIGNIFICANT CHANGE UP
HIV1 RNA SER-IMP: SIGNIFICANT CHANGE UP
HIV1 RNA SERPL NAA+PROBE-ACNC: SIGNIFICANT CHANGE UP
HIV1 RNA SERPL NAA+PROBE-LOG#: SIGNIFICANT CHANGE UP LG COP/ML
MCHC RBC-ENTMCNC: 30.9 PG — SIGNIFICANT CHANGE UP (ref 27–34)
MCHC RBC-ENTMCNC: 32.1 GM/DL — SIGNIFICANT CHANGE UP (ref 32–36)
MCV RBC AUTO: 96.3 FL — SIGNIFICANT CHANGE UP (ref 80–100)
PLATELET # BLD AUTO: 311 K/UL — SIGNIFICANT CHANGE UP (ref 150–400)
POTASSIUM SERPL-MCNC: 4.1 MMOL/L — SIGNIFICANT CHANGE UP (ref 3.5–5.3)
POTASSIUM SERPL-SCNC: 4.1 MMOL/L — SIGNIFICANT CHANGE UP (ref 3.5–5.3)
PROT SERPL-MCNC: 5.7 G/DL — LOW (ref 6–8.3)
RBC # BLD: 2.93 M/UL — LOW (ref 4.2–5.8)
RBC # FLD: 20.1 % — HIGH (ref 10.3–14.5)
SODIUM SERPL-SCNC: 138 MMOL/L — SIGNIFICANT CHANGE UP (ref 135–145)
TACROLIMUS SERPL-MCNC: 8.7 NG/ML — SIGNIFICANT CHANGE UP
WBC # BLD: 3.1 K/UL — LOW (ref 3.8–10.5)
WBC # FLD AUTO: 3.1 K/UL — LOW (ref 3.8–10.5)

## 2018-06-01 PROCEDURE — 99231 SBSQ HOSP IP/OBS SF/LOW 25: CPT

## 2018-06-01 PROCEDURE — 99233 SBSQ HOSP IP/OBS HIGH 50: CPT

## 2018-06-01 PROCEDURE — 99233 SBSQ HOSP IP/OBS HIGH 50: CPT | Mod: GC

## 2018-06-01 PROCEDURE — 99232 SBSQ HOSP IP/OBS MODERATE 35: CPT

## 2018-06-01 RX ORDER — TACROLIMUS 5 MG/1
7 CAPSULE ORAL
Qty: 420 | Refills: 1 | OUTPATIENT
Start: 2018-06-01 | End: 2018-07-30

## 2018-06-01 RX ORDER — ATOVAQUONE 750 MG/5ML
10 SUSPENSION ORAL
Qty: 240 | Refills: 0 | OUTPATIENT
Start: 2018-06-01 | End: 2018-06-30

## 2018-06-01 RX ORDER — MAGNESIUM OXIDE 400 MG ORAL TABLET 241.3 MG
1 TABLET ORAL
Qty: 60 | Refills: 1 | OUTPATIENT
Start: 2018-06-01 | End: 2018-07-30

## 2018-06-01 RX ORDER — VALGANCICLOVIR 450 MG/1
1 TABLET, FILM COATED ORAL
Qty: 60 | Refills: 0 | OUTPATIENT
Start: 2018-06-01 | End: 2018-06-30

## 2018-06-01 RX ORDER — MYCOPHENOLATE MOFETIL 250 MG/1
1 CAPSULE ORAL
Qty: 60 | Refills: 0 | OUTPATIENT
Start: 2018-06-01 | End: 2018-06-30

## 2018-06-01 RX ORDER — TACROLIMUS 5 MG/1
7 CAPSULE ORAL
Qty: 0 | Refills: 0 | Status: DISCONTINUED | OUTPATIENT
Start: 2018-06-01 | End: 2018-06-02

## 2018-06-01 RX ORDER — ASPIRIN/CALCIUM CARB/MAGNESIUM 324 MG
1 TABLET ORAL
Qty: 30 | Refills: 0 | OUTPATIENT
Start: 2018-06-01 | End: 2018-06-30

## 2018-06-01 RX ORDER — ATORVASTATIN CALCIUM 80 MG/1
1 TABLET, FILM COATED ORAL
Qty: 30 | Refills: 1 | OUTPATIENT
Start: 2018-06-01 | End: 2018-07-30

## 2018-06-01 RX ORDER — FAMOTIDINE 10 MG/ML
1 INJECTION INTRAVENOUS
Qty: 30 | Refills: 0 | OUTPATIENT
Start: 2018-06-01 | End: 2018-06-30

## 2018-06-01 RX ORDER — GLECAPREVIR AND PIBRENTASVIR 40; 100 MG/1; MG/1
3 TABLET, FILM COATED ORAL
Qty: 90 | Refills: 1 | OUTPATIENT
Start: 2018-06-01 | End: 2018-07-30

## 2018-06-01 RX ORDER — INSULIN LISPRO 100/ML
8 VIAL (ML) SUBCUTANEOUS
Qty: 15 | Refills: 0 | OUTPATIENT
Start: 2018-06-01 | End: 2018-06-30

## 2018-06-01 RX ORDER — NYSTATIN 500MM UNIT
5 POWDER (EA) MISCELLANEOUS
Qty: 80 | Refills: 1 | OUTPATIENT
Start: 2018-06-01 | End: 2018-07-30

## 2018-06-01 RX ORDER — SODIUM BICARBONATE 1 MEQ/ML
2 SYRINGE (ML) INTRAVENOUS
Qty: 180 | Refills: 1 | OUTPATIENT
Start: 2018-06-01 | End: 2018-07-30

## 2018-06-01 RX ORDER — TACROLIMUS 5 MG/1
1 CAPSULE ORAL ONCE
Qty: 0 | Refills: 0 | Status: COMPLETED | OUTPATIENT
Start: 2018-06-01 | End: 2018-06-01

## 2018-06-01 RX ORDER — DOCUSATE SODIUM 100 MG
1 CAPSULE ORAL
Qty: 90 | Refills: 0 | OUTPATIENT
Start: 2018-06-01 | End: 2018-06-30

## 2018-06-01 RX ORDER — DILTIAZEM HCL 120 MG
1 CAPSULE, EXT RELEASE 24 HR ORAL
Qty: 30 | Refills: 0 | OUTPATIENT
Start: 2018-06-01 | End: 2018-06-30

## 2018-06-01 RX ORDER — ENOXAPARIN SODIUM 100 MG/ML
60 INJECTION SUBCUTANEOUS
Qty: 3600 | Refills: 0 | OUTPATIENT
Start: 2018-06-01 | End: 2018-06-30

## 2018-06-01 RX ORDER — ENOXAPARIN SODIUM 100 MG/ML
13 INJECTION SUBCUTANEOUS
Qty: 15 | Refills: 1 | OUTPATIENT
Start: 2018-06-01 | End: 2018-07-30

## 2018-06-01 RX ADMIN — ATOVAQUONE 1500 MILLIGRAM(S): 750 SUSPENSION ORAL at 11:53

## 2018-06-01 RX ADMIN — Medication 1 TABLET(S): at 11:53

## 2018-06-01 RX ADMIN — Medication 500000 UNIT(S): at 11:53

## 2018-06-01 RX ADMIN — MYCOPHENOLATE MOFETIL 500 MILLIGRAM(S): 250 CAPSULE ORAL at 20:05

## 2018-06-01 RX ADMIN — MAGNESIUM OXIDE 400 MG ORAL TABLET 400 MILLIGRAM(S): 241.3 TABLET ORAL at 06:21

## 2018-06-01 RX ADMIN — TACROLIMUS 6.5 MILLIGRAM(S): 5 CAPSULE ORAL at 08:07

## 2018-06-01 RX ADMIN — MAGNESIUM OXIDE 400 MG ORAL TABLET 400 MILLIGRAM(S): 241.3 TABLET ORAL at 17:29

## 2018-06-01 RX ADMIN — TACROLIMUS 1 MILLIGRAM(S): 5 CAPSULE ORAL at 14:13

## 2018-06-01 RX ADMIN — Medication 1 APPLICATION(S): at 06:25

## 2018-06-01 RX ADMIN — FAMOTIDINE 20 MILLIGRAM(S): 10 INJECTION INTRAVENOUS at 17:29

## 2018-06-01 RX ADMIN — MYCOPHENOLATE MOFETIL 500 MILLIGRAM(S): 250 CAPSULE ORAL at 08:07

## 2018-06-01 RX ADMIN — Medication 500000 UNIT(S): at 23:32

## 2018-06-01 RX ADMIN — Medication 1300 MILLIGRAM(S): at 14:13

## 2018-06-01 RX ADMIN — Medication 2 TABLET(S): at 17:30

## 2018-06-01 RX ADMIN — Medication 7.5 MILLIGRAM(S): at 06:22

## 2018-06-01 RX ADMIN — Medication 500000 UNIT(S): at 17:31

## 2018-06-01 RX ADMIN — Medication 1: at 17:29

## 2018-06-01 RX ADMIN — INSULIN GLARGINE 13 UNIT(S): 100 INJECTION, SOLUTION SUBCUTANEOUS at 22:05

## 2018-06-01 RX ADMIN — Medication 2 TABLET(S): at 06:20

## 2018-06-01 RX ADMIN — ATORVASTATIN CALCIUM 10 MILLIGRAM(S): 80 TABLET, FILM COATED ORAL at 22:05

## 2018-06-01 RX ADMIN — Medication 7.5 MILLIGRAM(S): at 17:29

## 2018-06-01 RX ADMIN — Medication 8 UNIT(S): at 17:29

## 2018-06-01 RX ADMIN — Medication 81 MILLIGRAM(S): at 11:53

## 2018-06-01 RX ADMIN — VALGANCICLOVIR 450 MILLIGRAM(S): 450 TABLET, FILM COATED ORAL at 17:29

## 2018-06-01 RX ADMIN — ENOXAPARIN SODIUM 70 MILLIGRAM(S): 100 INJECTION SUBCUTANEOUS at 17:29

## 2018-06-01 RX ADMIN — Medication 1300 MILLIGRAM(S): at 22:05

## 2018-06-01 RX ADMIN — TACROLIMUS 7 MILLIGRAM(S): 5 CAPSULE ORAL at 20:06

## 2018-06-01 RX ADMIN — Medication 8 UNIT(S): at 11:54

## 2018-06-01 RX ADMIN — SODIUM CHLORIDE 3 MILLILITER(S): 9 INJECTION INTRAMUSCULAR; INTRAVENOUS; SUBCUTANEOUS at 07:18

## 2018-06-01 RX ADMIN — Medication 500000 UNIT(S): at 06:20

## 2018-06-01 RX ADMIN — Medication 3: at 11:54

## 2018-06-01 RX ADMIN — Medication 10 UNIT(S): at 08:07

## 2018-06-01 RX ADMIN — SODIUM CHLORIDE 3 MILLILITER(S): 9 INJECTION INTRAMUSCULAR; INTRAVENOUS; SUBCUTANEOUS at 13:14

## 2018-06-01 RX ADMIN — SODIUM CHLORIDE 3 MILLILITER(S): 9 INJECTION INTRAMUSCULAR; INTRAVENOUS; SUBCUTANEOUS at 20:23

## 2018-06-01 RX ADMIN — ENOXAPARIN SODIUM 70 MILLIGRAM(S): 100 INJECTION SUBCUTANEOUS at 06:21

## 2018-06-01 RX ADMIN — Medication 1300 MILLIGRAM(S): at 06:22

## 2018-06-01 RX ADMIN — Medication 1 APPLICATION(S): at 20:06

## 2018-06-01 RX ADMIN — FAMOTIDINE 20 MILLIGRAM(S): 10 INJECTION INTRAVENOUS at 06:21

## 2018-06-01 RX ADMIN — Medication 180 MILLIGRAM(S): at 06:21

## 2018-06-01 RX ADMIN — VALGANCICLOVIR 450 MILLIGRAM(S): 450 TABLET, FILM COATED ORAL at 06:21

## 2018-06-01 NOTE — DISCHARGE NOTE ADULT - NS AS ACTIVITY OBS
No Heavy lifting/straining/Sex allowed/Walking-Outdoors allowed/Stairs allowed Sex allowed/Walking-Indoors allowed/Walking-Outdoors allowed/Stairs allowed/Showering allowed/No Heavy lifting/straining

## 2018-06-01 NOTE — DISCHARGE NOTE ADULT - PATIENT PORTAL LINK FT
You can access the [facility] Portal, offered by [facility] [place], by registering with the following website: [email or web address]

## 2018-06-01 NOTE — PROGRESS NOTE ADULT - ASSESSMENT
68M s/p cardiac transplant 2/23/18 for NICM, on Tacrolimus, Cellcept and Prednisone 12.5mg BID, post op course complicated by ? graft rejection requiring plasmapheresis in April ,4/19-4/26/18. Admitted 5/23/18 with marked medication-induced hyperglycemia, now much improved blood sugars on insulin. Immune compromised but clinically well, no fevers, does not seem to have been provoked by an infection. Cultures in lab. Acquired HCV from donor, on treatment with Mavyret. CMV viral load negative 4/19, on Valgancyclovir prophylaxis. Blood culture is negative, urine culture with Kleb oxytoca    Recommend    -continue Mepron prophylaxis and Valganciclovir at current doses  -f/u blood and urine cultures in lab are no growth  -fu CMV viral load will send repeat, HCV viral load  -monitor CBC, now with mild leukopenia ? related to Valcyte      few scattered hemorrhagic blister like lesions abdomen left forearm ? etiology- fluid sent for culutre, gram stain , fungal and AFB stains  gram stain, AFB stain and cultures  are NGTD  no new skin lesions- suspect non infectious  Agree with plans to discharge home in AM      Gary Lujan MD  [phone]  After 5pm/weekends [phone]

## 2018-06-01 NOTE — DISCHARGE NOTE ADULT - DO YOU HAVE DIFFICULTY CLIMBING STAIRS
Patient has past medical history inclusive of trisomy 21 and iron deficiency anemia (treated with iron supplementation).  She has undergone hospitalization thus far for management of febrile status epilepticus, respiratory failure, frontal lobe infarcts, and newly diagnosed Moyamoya disease.  Patient was previously followed by Pediatric Nutrition Support Team, during which time she was maintained upon parenteral and enteral nutrition regimens.  However, care has now been transferred to this RD.  RD met with patient and mother during time of encounter.  RD has been able to converse with mother within her native language of [place].  Current diet prescription depicts the following:  nasogastric feeds (previous order for nasoduodenal feeds) of Pediasure 1.0 kcal per ml formula, 180 ml (delivered at a rate of 120 ml/hr) administered every 4 hours.  This regimen yields approximately 1,080 kcal and 32.4 grams of protein daily.  As per mother and medical team, patient has been with relatively good tolerance of this regimen, with no recent episodes of emesis or diarrhea.  Patient underwent subsequent swallow study yesterday, the results of which have indicated for continued provision of non-oral means of nutrition and hydration.  In addition, Speech Language Pathologist has permitted for the initiation of therapeutic trials of puree consistency foods with SLP.  Mother has verbalized good comprehension regarding this recommendation.  Mother states that prior to hospital admission, patient was maintained upon an oral diet regimen consisting of thin fluids (primarily juice and milk), in addition to a variety of solid foods (such as macaroni and cheese, pasta, chicken, meat, rice, some vegetables, and an extensive array of fruits).  Patient has no known food allergies, however she avoids feeding patient fish and peanut butter.  Mother remarks that patient's maximum weight ever achieved equated to 12.7 kg.  Most recent inpatient weight obtained on 1/14/18 equated to 12.3 kg. No

## 2018-06-01 NOTE — PROGRESS NOTE ADULT - ASSESSMENT
67 year old man with ACC/AHA stage D chronic systolic heart failure due to NICM s/p HM2 LVAD 6/17 c/b pump thrombosis now s/p heart transplant on 2/23 (CMV D-/R+ and Toxo D-/R+), with post-op course c/b primary graft dysfunction, initially thought to be immune-mediated due to positive B-cell flow (negative CDC cross match) and received plasmapharesis/IVIg x2 with improvement in LV function. His course has been complicated by bilateral IJ/subclavian thrombi (on lovenox). Given persistent C4D staining and decline in LV function in April he was treated with plasmapharesis/IVIg and rituxan (received 2 doses, last 5/9) for suspected AMR (noted to have non-HLA Ab to angiotension II). Has since started Mavyret for HCV treatment. Was admitted for hyperglycemia, hyponatremia and acute kidney injury on 5/23 which has improved with insulin therapy. Noted to have papular lesions on his arms and a bullous lesion of the abdomen that were new. Labs also notable for continued leukopenia.     Immunosuppression  - Tacro level 9.0; increase to 7/7 PO   - Continued leukopenia on cellcept 500 mg q12 PO; stable  - on pred 7.5 q12; continue for now    Graft function   - EF 48-50% on 5/24 (unchanged from TTE 5/9)    CAV PPx  - on ASA 81 mg daily  - on lipitor 10 mg daily    Antimicrobial PPx  - on nystatin  - continue mepron 1500 mg daily  - on valcyte 450 mg BID    Diabetes  - new onset likely 2/2 steroids/prograf  - appreciate endo assistance; c/w home DM education  - BGs improved    Renal dysfunction   - improved  - continue monitoring for now  - on sodium bicarb for hyperkalemia which has resolved    Papular lesions  - Appears to have spontaneously occurred, does not appear to be related to trauma or Lovenox injection, unclear etiology; appreciate derm c/s  - coags wnl and factor Xa level 4 hours post next Lovenox dose  - culture results negative; HIV negative    B/L UE DVTs  - Repeat upper extremity Dopplers pending

## 2018-06-01 NOTE — PROGRESS NOTE ADULT - ASSESSMENT
69 y/o M w/h/o HTN/CHF/STV/Tony Fib>AICD/PAF/NICM>LVAD and more recently heart tx c/b DVT on Lovenox. Here with NODAT. Tolerating POs. Glycemic control mostly at goal while on present insulin regimen. No hypoglycemia. Going home tomorrow.   Pt has all written materials about DM care as wll as diabetes supplies at bedside.

## 2018-06-01 NOTE — DISCHARGE NOTE ADULT - MEDICATION SUMMARY - MEDICATIONS TO TAKE
I will START or STAY ON the medications listed below when I get home from the hospital:    predniSONE 2.5 mg oral tablet  -- 3 tab(s) by mouth 2 times a day  -- Indication: For rejection    aspirin 81 mg oral delayed release tablet  -- 1 tab(s) by mouth once a day  -- Indication: For Antiplatelet    sodium bicarbonate 650 mg oral tablet  -- 2 tab(s) by mouth every 8 hours  -- Indication: For Antacid    dilTIAZem 180 mg/24 hours oral capsule, extended release  -- 1 cap(s) by mouth once a day  -- Indication: For Heart rate    enoxaparin 60 mg/0.6 mL injectable solution  -- 60 milligram(s) subcutaneously 2 times a day   -- It is very important that you take or use this exactly as directed.  Do not skip doses or discontinue unless directed by your doctor.    -- Indication: For Anticoagulation    Lantus Solostar Pen 100 units/mL subcutaneous solution  -- 13 unit(s) subcutaneous once a day   -- Do not drink alcoholic beverages when taking this medication.  It is very important that you take or use this exactly as directed.  Do not skip doses or discontinue unless directed by your doctor.  Keep in refrigerator.  Do not freeze.    -- Indication: For glucose    HumaLOG KwikPen 100 units/mL injectable solution  -- 8 unit(s) injectable 3 times a day (before meals) x 30 days   -- Indication: For glucose    nystatin 100,000 units/mL oral suspension  -- 5 milliliter(s) by mouth every 6 hours  -- Indication: For Antifungal    atorvastatin 10 mg oral tablet  -- 1 tab(s) by mouth once a day (at bedtime)  -- Indication: For cholesterol    valGANciclovir 450 mg oral tablet  -- 1 tab(s) by mouth every 12 hours  -- Indication: For Antiviral    Mavyret 100 mg-40 mg oral tablet  -- 3 tab(s) by mouth once a day   -- It is very important that you take or use this exactly as directed.  Do not skip doses or discontinue unless directed by your doctor.  It may be advisable to drink a full glass orange juice or eat a banana daily while taking this medication.  Obtain medical advice before taking any non-prescription drugs as some may affect the action of this medication.  Take with food.    -- Indication: For Antiviral    silver sulfADIAZINE 1% topical cream  -- 1 application on skin 2 times a day  -- Indication: For finger wound, derm agent    famotidine 20 mg oral tablet  -- 1 tab(s) by mouth once a day   -- Indication: For Gastrointestinal    mycophenolate mofetil 500 mg oral tablet  -- 1 tab(s) by mouth 2 times a day   -- Do not take this drug if you are pregnant.    -- Indication: For immunosuppression    tacrolimus 1 mg oral capsule  -- 7 cap(s) by mouth 2 times a day x 30 days   -- Avoid grapefruit and grapefruit juice while taking this medication.  It is very important that you take or use this exactly as directed.  Do not skip doses or discontinue unless directed by your doctor.    -- Indication: For immunosuppression    docusate sodium 100 mg oral capsule  -- 1 cap(s) by mouth 3 times a day, As needed, Constipation  -- Indication: For laxative    magnesium oxide 400 mg (241.3 mg elemental magnesium) oral tablet  -- 1 tab(s) by mouth every 12 hours  -- Indication: For supplement    atovaquone 750 mg/5 mL oral suspension  -- 10 milliliter(s) by mouth once a day  -- Indication: For Antiinfective    calcium-vitamin D 500 mg-200 intl units oral tablet  -- 2 tab(s) by mouth 2 times a day  -- Indication: For supplement    Multiple Vitamins oral tablet  -- 1 tab(s) by mouth once a day  -- Indication: For supplement I will START or STAY ON the medications listed below when I get home from the hospital:    predniSONE 2.5 mg oral tablet  -- 3 tab(s) by mouth 2 times a day  -- Indication: For rejection    aspirin 81 mg oral delayed release tablet  -- 1 tab(s) by mouth once a day  -- Indication: For Antiplatelet    sodium bicarbonate 650 mg oral tablet  -- 2 tab(s) by mouth every 8 hours  -- Indication: For Antacid    dilTIAZem 180 mg/24 hours oral capsule, extended release  -- 1 cap(s) by mouth once a day  -- Indication: For Heart rate    enoxaparin 60 mg/0.6 mL injectable solution  -- 60 milligram(s) subcutaneously 2 times a day   -- It is very important that you take or use this exactly as directed.  Do not skip doses or discontinue unless directed by your doctor.    -- Indication: For Anticoagulation    HumaLOG KwikPen 100 units/mL injectable solution  -- 8 unit(s) injectable 3 times a day (before meals) x 30 days   -- Indication: For glucose    Lantus Solostar Pen 100 units/mL subcutaneous solution  -- 12 unit(s) subcutaneous once a day 2   -- Do not drink alcoholic beverages when taking this medication.  It is very important that you take or use this exactly as directed.  Do not skip doses or discontinue unless directed by your doctor.  Keep in refrigerator.  Do not freeze.    -- Indication: For At night for blood sugar    nystatin 100,000 units/mL oral suspension  -- 5 milliliter(s) by mouth every 6 hours  -- Indication: For Antifungal    atorvastatin 10 mg oral tablet  -- 1 tab(s) by mouth once a day (at bedtime)  -- Indication: For cholesterol    Mavyret 100 mg-40 mg oral tablet  -- 3 tab(s) by mouth once a day   -- It is very important that you take or use this exactly as directed.  Do not skip doses or discontinue unless directed by your doctor.  It may be advisable to drink a full glass orange juice or eat a banana daily while taking this medication.  Obtain medical advice before taking any non-prescription drugs as some may affect the action of this medication.  Take with food.    -- Indication: For Antiviral    valGANciclovir 450 mg oral tablet  -- 1 tab(s) by mouth every 12 hours  -- Indication: For Antiviral    mupirocin 2% topical ointment  -- Apply on skin to affected area 2 times a day   x 14 days   -- Indication: For To abdominal wound    silver sulfADIAZINE 1% topical cream  -- 1 application on skin 2 times a day  -- Indication: For finger wound, derm agent    famotidine 20 mg oral tablet  -- 1 tab(s) by mouth once a day   -- Indication: For Gastrointestinal    mycophenolate mofetil 500 mg oral tablet  -- 1 tab(s) by mouth 2 times a day   -- Do not take this drug if you are pregnant.    -- Indication: For immunosuppression    tacrolimus 1 mg oral capsule  -- 8 cap(s) by mouth 2 times a day x 30 days  x 30 days   -- Avoid grapefruit and grapefruit juice while taking this medication.  It is very important that you take or use this exactly as directed.  Do not skip doses or discontinue unless directed by your doctor.    -- Indication: For 2x day    docusate sodium 100 mg oral capsule  -- 1 cap(s) by mouth 3 times a day, As needed, Constipation  -- Indication: For laxative    magnesium oxide 400 mg (241.3 mg elemental magnesium) oral tablet  -- 1 tab(s) by mouth every 12 hours  -- Indication: For supplement    atovaquone 750 mg/5 mL oral suspension  -- 10 milliliter(s) by mouth once a day  -- Indication: For Antiinfective    calcium-vitamin D 500 mg-200 intl units oral tablet  -- 2 tab(s) by mouth 2 times a day  -- Indication: For supplement    Multiple Vitamins oral tablet  -- 1 tab(s) by mouth once a day  -- Indication: For supplement I will START or STAY ON the medications listed below when I get home from the hospital:    predniSONE 2.5 mg oral tablet  -- 3 tab(s) by mouth 2 times a day  -- Indication: For rejection    aspirin 81 mg oral delayed release tablet  -- 1 tab(s) by mouth once a day  -- Indication: For Antiplatelet    sodium bicarbonate 650 mg oral tablet  -- 2 tab(s) by mouth every 8 hours  -- Indication: For Antacid    dilTIAZem 180 mg/24 hours oral capsule, extended release  -- 1 cap(s) by mouth once a day  -- Indication: For Heart rate    Lantus Solostar Pen 100 units/mL subcutaneous solution  -- 7 unit(s) subcutaneous once a day (at bedtime)    Give 1 month supply  -- Do not drink alcoholic beverages when taking this medication.  It is very important that you take or use this exactly as directed.  Do not skip doses or discontinue unless directed by your doctor.  Keep in refrigerator.  Do not freeze.    -- Indication: For Diabetes mellitus due to underlying condition with hyperglycemia, without long-term current use of insulin    HumaLOG KwikPen 100 units/mL injectable solution  -- 3 unit(s) injectable 3 times a day (before meals)  Give 1 month supply   -- Indication: For Diabetes mellitus due to underlying condition with hyperglycemia, without long-term current use of insulin    atorvastatin 10 mg oral tablet  -- 1 tab(s) by mouth once a day (at bedtime)  -- Indication: For cholesterol    valGANciclovir 450 mg oral tablet  -- 1 tab(s) by mouth every 12 hours  -- Indication: For Antiviral    Mavyret 100 mg-40 mg oral tablet  -- 3 tab(s) by mouth once a day   -- It is very important that you take or use this exactly as directed.  Do not skip doses or discontinue unless directed by your doctor.  It may be advisable to drink a full glass orange juice or eat a banana daily while taking this medication.  Obtain medical advice before taking any non-prescription drugs as some may affect the action of this medication.  Take with food.    -- Indication: For Antiviral    mupirocin 2% topical ointment  -- Apply on skin to affected area 2 times a day   x 14 days   -- Indication: For To abdominal wound    silver sulfADIAZINE 1% topical cream  -- 1 application on skin 2 times a day  -- Indication: For finger wound, derm agent    famotidine 20 mg oral tablet  -- 1 tab(s) by mouth once a day   -- Indication: For Gastrointestinal    mycophenolate mofetil 500 mg oral tablet  -- 1 tab(s) by mouth 2 times a day   -- Do not take this drug if you are pregnant.    -- Indication: For immunosuppression    tacrolimus 1 mg oral capsule  -- 8 cap(s) by mouth 2 times a day x 30 days  x 30 days   -- Avoid grapefruit and grapefruit juice while taking this medication.  It is very important that you take or use this exactly as directed.  Do not skip doses or discontinue unless directed by your doctor.    -- Indication: For 2x day    docusate sodium 100 mg oral capsule  -- 1 cap(s) by mouth 3 times a day, As needed, Constipation  -- Indication: For laxative    magnesium oxide 400 mg (241.3 mg elemental magnesium) oral tablet  -- 1 tab(s) by mouth every 12 hours  -- Indication: For supplement    atovaquone 750 mg/5 mL oral suspension  -- 10 milliliter(s) by mouth once a day  -- Indication: For Antiinfective    calcium-vitamin D 500 mg-200 intl units oral tablet  -- 2 tab(s) by mouth 2 times a day  -- Indication: For supplement    Multiple Vitamins oral tablet  -- 1 tab(s) by mouth once a day  -- Indication: For supplement I will START or STAY ON the medications listed below when I get home from the hospital:    predniSONE 2.5 mg oral tablet  -- 3 tab(s) by mouth 2 times a day  -- Indication: For rejection    aspirin 81 mg oral delayed release tablet  -- 1 tab(s) by mouth once a day  -- Indication: For Antiplatelet    sodium bicarbonate 650 mg oral tablet  -- 2 tab(s) by mouth every 8 hours  -- Indication: For Antacid    dilTIAZem 180 mg/24 hours oral capsule, extended release  -- 1 cap(s) by mouth once a day  -- Indication: For Heart rate    Lantus Solostar Pen 100 units/mL subcutaneous solution  -- 7 unit(s) subcutaneous once a day (at bedtime)    Give 1 month supply  -- Do not drink alcoholic beverages when taking this medication.  It is very important that you take or use this exactly as directed.  Do not skip doses or discontinue unless directed by your doctor.  Keep in refrigerator.  Do not freeze.    -- Indication: For Diabetes mellitus due to underlying condition with hyperglycemia, without long-term current use of insulin    HumaLOG KwikPen 100 units/mL injectable solution  -- 3 unit(s) injectable 3 times a day (before meals)  Give 1 month supply   -- Indication: For Diabetes mellitus due to underlying condition with hyperglycemia, without long-term current use of insulin    atorvastatin 10 mg oral tablet  -- 1 tab(s) by mouth once a day (at bedtime)  -- Indication: For cholesterol    valGANciclovir 450 mg oral tablet  -- 1 tab(s) by mouth every 12 hours  -- Indication: For Antiviral    Mavyret 100 mg-40 mg oral tablet  -- 3 tab(s) by mouth once a day   -- It is very important that you take or use this exactly as directed.  Do not skip doses or discontinue unless directed by your doctor.  It may be advisable to drink a full glass orange juice or eat a banana daily while taking this medication.  Obtain medical advice before taking any non-prescription drugs as some may affect the action of this medication.  Take with food.    -- Indication: For Antiviral    mupirocin 2% topical ointment  -- Apply on skin to affected area 2 times a day   x 14 days   -- Indication: For To abdominal wound    silver sulfADIAZINE 1% topical cream  -- 1 application on skin 2 times a day  -- Indication: For finger wound, derm agent    famotidine 20 mg oral tablet  -- 1 tab(s) by mouth once a day   -- Indication: For Gastrointestinal    mycophenolate mofetil 500 mg oral tablet  -- 1 tab(s) by mouth 2 times a day   -- Do not take this drug if you are pregnant.    -- Indication: For immunosuppression    tacrolimus 1 mg oral capsule  -- 8 cap(s) by mouth 2 times a day x 30 days  x 30 days   -- Avoid grapefruit and grapefruit juice while taking this medication.  It is very important that you take or use this exactly as directed.  Do not skip doses or discontinue unless directed by your doctor.    -- Indication: For 2x day    tacrolimus 1 mg oral capsule  -- 8 cap(s) by mouth 2 times a day  -- Avoid grapefruit and grapefruit juice while taking this medication.  It is very important that you take or use this exactly as directed.  Do not skip doses or discontinue unless directed by your doctor.    -- Indication: For prevent organ rejection s/p transplant    magnesium oxide 400 mg (241.3 mg elemental magnesium) oral tablet  -- 1 tab(s) by mouth every 12 hours  -- Indication: For supplement    atovaquone 750 mg/5 mL oral suspension  -- 10 milliliter(s) by mouth once a day  -- Indication: For Antiinfective    calcium-vitamin D 500 mg-200 intl units oral tablet  -- 2 tab(s) by mouth 2 times a day  -- Indication: For supplement    Multiple Vitamins oral tablet  -- 1 tab(s) by mouth once a day  -- Indication: For supplement I will START or STAY ON the medications listed below when I get home from the hospital:    predniSONE 2.5 mg oral tablet  -- 3 tab(s) by mouth 2 times a day  -- Indication: For rejection    aspirin 81 mg oral delayed release tablet  -- 1 tab(s) by mouth once a day  -- Indication: For Antiplatelet    sodium bicarbonate 650 mg oral tablet  -- 2 tab(s) by mouth every 8 hours  -- Indication: For Antacid    dilTIAZem 180 mg/24 hours oral capsule, extended release  -- 1 cap(s) by mouth once a day  -- Indication: For Heart rate    Lantus Solostar Pen 100 units/mL subcutaneous solution  -- 7 unit(s) subcutaneous once a day (at bedtime)    Give 1 month supply  -- Do not drink alcoholic beverages when taking this medication.  It is very important that you take or use this exactly as directed.  Do not skip doses or discontinue unless directed by your doctor.  Keep in refrigerator.  Do not freeze.    -- Indication: For Diabetes mellitus due to underlying condition with hyperglycemia, without long-term current use of insulin    HumaLOG KwikPen 100 units/mL injectable solution  -- 3 unit(s) injectable 3 times a day (before meals)  Give 1 month supply   -- Indication: For Diabetes mellitus due to underlying condition with hyperglycemia, without long-term current use of insulin    atorvastatin 10 mg oral tablet  -- 1 tab(s) by mouth once a day (at bedtime)  -- Indication: For cholesterol    valGANciclovir 450 mg oral tablet  -- 1 tab(s) by mouth every 12 hours  -- Indication: For Antiviral    Mavyret 100 mg-40 mg oral tablet  -- 3 tab(s) by mouth once a day   -- It is very important that you take or use this exactly as directed.  Do not skip doses or discontinue unless directed by your doctor.  It may be advisable to drink a full glass orange juice or eat a banana daily while taking this medication.  Obtain medical advice before taking any non-prescription drugs as some may affect the action of this medication.  Take with food.    -- Indication: For Antiviral    mupirocin 2% topical ointment  -- Apply on skin to affected area 2 times a day   x 14 days   -- Indication: For To abdominal wound    silver sulfADIAZINE 1% topical cream  -- 1 application on skin 2 times a day  -- Indication: For finger wound, derm agent    famotidine 20 mg oral tablet  -- 1 tab(s) by mouth once a day   -- Indication: For Gastrointestinal    mycophenolate mofetil 500 mg oral tablet  -- 1 tab(s) by mouth 2 times a day   -- Do not take this drug if you are pregnant.    -- Indication: For immunosuppression    tacrolimus 1 mg oral capsule  -- 8 cap(s) by mouth 2 times a day  -- Avoid grapefruit and grapefruit juice while taking this medication.  It is very important that you take or use this exactly as directed.  Do not skip doses or discontinue unless directed by your doctor.    -- Indication: For prevent organ rejection s/p transplant    magnesium oxide 400 mg (241.3 mg elemental magnesium) oral tablet  -- 1 tab(s) by mouth every 12 hours  -- Indication: For supplement    atovaquone 750 mg/5 mL oral suspension  -- 10 milliliter(s) by mouth once a day  -- Indication: For Antiinfective    calcium-vitamin D 500 mg-200 intl units oral tablet  -- 2 tab(s) by mouth 2 times a day  -- Indication: For supplement    Multiple Vitamins oral tablet  -- 1 tab(s) by mouth once a day  -- Indication: For supplement

## 2018-06-01 NOTE — DISCHARGE NOTE ADULT - MEDICATION SUMMARY - MEDICATIONS TO STOP TAKING
I will STOP taking the medications listed below when I get home from the hospital:    enoxaparin  -- 80 milligram(s) subcutaneously every 12 hours - last dose 5/8 am    pravastatin 20 mg oral tablet  -- 1 tab(s) by mouth once a day (at bedtime)    amLODIPine 5 mg oral tablet  -- 1 tab(s) by mouth once a day    famotidine 20 mg oral tablet  -- 1 tab(s) by mouth 2 times a day    CellCept 500 mg oral tablet  -- 2 tab(s) by mouth 2 times a day at 0800 and 2000    predniSONE 2.5 mg oral tablet  -- 5 tab(s) by mouth 2 times a day at 0800 and 2000    hydroCHLOROthiazide 25 mg oral tablet  -- 1 tab(s) by mouth once a day I will STOP taking the medications listed below when I get home from the hospital:    enoxaparin  -- 80 milligram(s) subcutaneously every 12 hours - last dose 5/8 am    nystatin 100,000 units/mL oral suspension  -- 5 milliliter(s) by mouth every 6 hours    pravastatin 20 mg oral tablet  -- 1 tab(s) by mouth once a day (at bedtime)    amLODIPine 5 mg oral tablet  -- 1 tab(s) by mouth once a day    famotidine 20 mg oral tablet  -- 1 tab(s) by mouth 2 times a day    CellCept 500 mg oral tablet  -- 2 tab(s) by mouth 2 times a day at 0800 and 2000    predniSONE 2.5 mg oral tablet  -- 5 tab(s) by mouth 2 times a day at 0800 and 2000    hydroCHLOROthiazide 25 mg oral tablet  -- 1 tab(s) by mouth once a day

## 2018-06-01 NOTE — DISCHARGE NOTE ADULT - HOSPITAL COURSE
HCV viral load. Hypotension SBP 90's, Norvasc and HCTZ D/C'd. Urine culture with Klebsiella oxytoca; patient asymptomatic per ID no abx warranted at this time--> continue to monitor.  Progaf level 15; continue with current dose. Supplement potassium 40 meq PO x 1 for K+ 3.8.    5/27: HCV viral load/CMV PCR pending.  s/p Fall from chair. Bed Alarm in place  5/28 VSS - no complaints - Prograf level = 11.1  continue Tacro@6 BID  5/29: HCV viral load/CMV PCR pending.  Peripheral IV replaced.   Disposition: Home once medically cleared    5/30 cmv, hep c neg.  Pt w small hematoma on the surface of his abd. derm consulted, cultures sent  Insulin teaching in progress.  5/31/18-D/c planning ? home friday · Assessment		  68M- ACC/AHA stage D chronic systolic heart failure due to NICM (LVEF 20%) s/p HM2 LVAD 6/17 c/b pump thrombus now s/p OHT 2/23 (CMV D-/R+ and Toxo D-/R+), with post-op course c/b primary graft dysfunction, initially thought to be immune-mediated due to positive B-cell flow (negative CDC cross match) and received plasmapharesis/IVIg x2 with improvement in LV function. Now presenting with hyperglycemia, glucose initally >700,  likely 2/2 to steroids vs. prograf.   Hospital course:  5/24: Weaned off Insulin gtt transitioned to basal bolus insulin. Glucometer/Insulin teaching initiated  5/25: Overnight: HR with low noted to be 75-80 with sleep; Hypotensive with SPB 91; Decrease Cardizem to 180 mg daily; Prograft level 17.5; Mild neutropenia WBC 3.4 Decrease valganciclovir 450 mg po daily and Cellcept to 750 mg PO BID. Maintain Tacrolimus 6 mg PO BID; Continue with Diabetic teaching. Endo following for hyperglycemia; Lantus increased to 13 units HA and pre meal increased to 8 units TID.  5/26 VVS; Continue with current medication regimen.  Monitor leukopenia WBC 3.1.  Awaiting  HCV viral load. Hypotension SBP 90's, Norvasc and HCTZ D/C'd. Urine culture with Klebsiella oxytoca; patient asymptomatic per ID no abx warranted at this time--> continue to monitor.  Progaf level 15; continue with current dose. Supplement potassium 40 meq PO x 1 for K+ 3.8.    5/27: HCV viral load/CMV PCR pending.  s/p Fall from chair. Bed Alarm in place  5/28 VSS - no complaints - Prograf level = 11.1  continue Tacro@6 BID  5/29: HCV viral load/CMV PCR pending.  Peripheral IV replaced.   5/30 cmv, hep c neg.  Pt w small hematoma on the surface of his abd. derm consulted, cultures sent  Insulin teaching in progress.    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 Now presenting with hyperglycemia, glucose initally >700,  likely 2/2 to steroids vs. prograf.   Hospital course:  5/24: Weaned off Insulin gtt transitioned to basal bolus insulin. Glucometer/Insulin teaching initiated  5/25: Overnight: HR with low noted to be 75-80 with sleep; Hypotensive with SPB 91; Decrease Cardizem to 180 mg daily; Prograft level 17.5; Mild neutropenia WBC 3.4 Decrease valganciclovir 450 mg po daily and Cellcept to 750 mg PO BID. Maintain Tacrolimus 6 mg PO BID; Continue with Diabetic teaching. Endo following for hyperglycemia; Lantus increased to 13 units HA and pre meal increased to 8 units TID.  5/26 VVS; Continue with current medication regimen.  Monitor leukopenia WBC 3.1.  Awaiting  HCV viral load. Hypotension SBP 90's, Norvasc and HCTZ D/C'd. Urine culture with Klebsiella oxytoca; patient asymptomatic per ID no abx warranted at this time--> continue to monitor.  Progaf level 15; continue with current dose. Supplement potassium 40 meq PO x 1 for K+ 3.8.    5/27: HCV viral load/CMV PCR pending.  s/p Fall from chair. Bed Alarm in place  5/28 VSS - no complaints - Prograf level = 11.1  continue Tacro@6 BID  5/29: HCV viral load/CMV PCR pending.  Peripheral IV replaced.   5/30 cmv, hep c neg.  Pt w small hematoma on the surface of his abd. derm consulted, cultures sent  Insulin teaching in progress.    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 Now presenting with hyperglycemia, glucose initally >700,  likely 2/2 to steroids vs. prograf.   Hospital course:  5/24: Weaned off Insulin gtt transitioned to basal bolus insulin. Glucometer/Insulin teaching initiated  5/25: Overnight: HR with low noted to be 75-80 with sleep; Hypotensive with SPB 91; Decrease Cardizem to 180 mg daily; Prograft level 17.5; Mild neutropenia WBC 3.4 Decrease valganciclovir 450 mg po daily and Cellcept to 750 mg PO BID. Maintain Tacrolimus 6 mg PO BID; Continue with Diabetic teaching. Endo following for hyperglycemia; Lantus increased to 13 units HA and pre meal increased to 8 units TID.  5/26 VVS; Continue with current medication regimen.  Monitor leukopenia WBC 3.1.  Awaiting  HCV viral load. Hypotension SBP 90's, Norvasc and HCTZ D/C'd. Urine culture with Klebsiella oxytoca; patient asymptomatic per ID no abx warranted at this time--> continue to monitor.  Progaf level 15; continue with current dose. Supplement potassium 40 meq PO x 1 for K+ 3.8.    5/27: HCV viral load/CMV PCR pending.  s/p Fall from chair. Bed Alarm in place  5/28 VSS - no complaints - Prograf level = 11.1  continue Tacro@6 BID  5/29: HCV viral load/CMV PCR pending.  Peripheral IV replaced.   5/30 cmv, hep c neg.  Pt w small hematoma on the surface of his abd. derm consulted, cultures sent  Insulin teaching in progress.    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 Now presenting with hyperglycemia, glucose initally >700,  likely 2/2 to steroids vs. prograf.   Hospital course:  5/24: Weaned off Insulin gtt transitioned to basal bolus insulin. Glucometer/Insulin teaching initiated  5/25: Overnight: HR with low noted to be 75-80 with sleep; Hypotensive with SPB 91; Decrease Cardizem to 180 mg daily; Prograft level 17.5; Mild neutropenia WBC 3.4 Decrease valganciclovir 450 mg po daily and Cellcept to 750 mg PO BID. Maintain Tacrolimus 6 mg PO BID; Continue with Diabetic teaching. Endo following for hyperglycemia; Lantus increased to 13 units HA and pre meal increased to 8 units TID.  5/26 VVS; Continue with current medication regimen.  Monitor leukopenia WBC 3.1.  Awaiting HCV viral load. Hypotension SBP 90's, Norvasc and HCTZ D/C'd. Urine culture with Klebsiella oxytoca; patient asymptomatic per ID no abx warranted at this time--> continue to monitor.  Progaf level 15; continue with current dose. Supplement potassium 40 meq PO x 1 for K+ 3.8.    5/27: HCV viral load/CMV PCR pending.  s/p Fall from chair. Bed Alarm in place  5/28 VSS - no complaints - Prograf level = 11.1  continue Tacro@6 BID  5/29: HCV viral load/CMV PCR pending.  Peripheral IV replaced.   Disposition: Home once medically cleared    5/30 cmv, hep c neg.  Pt w small hematoma on the surface of his abd. derm consulted, cultures sent  Insulin teaching in progress.  5/31/18-D/c planning ? home friday 6/2     VSS   prograf dose increased to 8 mg  bid,  discharge cx  secobdary to pt " has no ride"  and home care asst   not set up,  6/3   wct   obtaining echo,   small drop in Hct   obtaining abd ct to assess  for bleed, bs  60  lantus decreased  6/4  R and L heart catheterization in am for Bx and evaluate coronary arteries.  UE duplex --negative for DVT.  CT chest ordered to eval lung nodule.  Tacro level 9.1.  Continue Prograf 7.5 BID --going to get Mayvret tonight.  Hold lovenox tonight and in am for cath.  6/5 CT Chest: new LLL nodule 2.5x2.5cm likely infectious in nature, small right pleural effusion. Will check fungitell per ID. Tacro level 8.3. Prograf increased to 8 BID. OK to continue off AC (SQ Lovenox) per Vascular Cardiology - Dr. Tavarez. F/U cardiac biopsy results and dc home in AM. 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 Now presenting with hyperglycemia, glucose initally >700,  likely 2/2 to steroids vs. prograf.   Hospital course:  5/24: Weaned off Insulin gtt transitioned to basal bolus insulin. Glucometer/Insulin teaching initiated  5/25: Overnight: HR with low noted to be 75-80 with sleep; Hypotensive with SPB 91; Decrease Cardizem to 180 mg daily; Prograft level 17.5; Mild neutropenia WBC 3.4 Decrease valganciclovir 450 mg po daily and Cellcept to 750 mg PO BID. Maintain Tacrolimus 6 mg PO BID; Continue with Diabetic teaching. Endo following for hyperglycemia; Lantus increased to 13 units HA and pre meal increased to 8 units TID.  5/26 VVS; Continue with current medication regimen.  Monitor leukopenia WBC 3.1.  Awaiting HCV viral load. Hypotension SBP 90's, Norvasc and HCTZ D/C'd. Urine culture with Klebsiella oxytoca; patient asymptomatic per ID no abx warranted at this time--> continue to monitor.  Progaf level 15; continue with current dose. Supplement potassium 40 meq PO x 1 for K+ 3.8.    5/27: HCV viral load/CMV PCR pending.  s/p Fall from chair. Bed Alarm in place  5/28 VSS - no complaints - Prograf level = 11.1  continue Tacro@6 BID  5/29: HCV viral load/CMV PCR pending.  Peripheral IV replaced.   Disposition: Home once medically cleared    5/30 cmv, hep c neg.  Pt w small hematoma on the surface of his abd. derm consulted, cultures sent  Insulin teaching in progress.  5/31/18-D/c planning ? home friday 6/2     VSS   prograf dose increased to 8 mg  bid,  discharge cx  secobdary to pt " has no ride"  and home care asst   not set up,  6/3   wct   obtaining echo,   small drop in Hct   obtaining abd ct to assess  for bleed, bs  60  lantus decreased  6/4  R and L heart catheterization in am for Bx and evaluate coronary arteries.  UE duplex --negative for DVT.  CT chest ordered to eval lung nodule.  Tacro level 9.1.  Continue Prograf 7.5 BID --going to get Mayvret tonight.  Hold lovenox tonight and in am for cath.  6/5 CT Chest: new LLL nodule 2.5x2.5cm likely infectious in nature, small right pleural effusion. Will check fungitell per ID. Tacro level 8.3. Prograf increased to 8 BID. OK to continue off AC (SQ Lovenox) per Vascular Cardiology - Dr. Tavarez. F/U cardiac biopsy results and dc home in AM.  6/6 Pt cleared for discharge home today. 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 Now presenting with hyperglycemia, glucose initally >700,  likely 2/2 to steroids vs. prograf.   Hospital course:  5/24: Weaned off Insulin gtt transitioned to basal bolus insulin. Glucometer/Insulin teaching initiated  5/25: Overnight: HR with low noted to be 75-80 with sleep; Hypotensive with SPB 91; Decrease Cardizem to 180 mg daily; Prograft level 17.5; Mild neutropenia WBC 3.4 Decrease valganciclovir 450 mg po daily and Cellcept to 750 mg PO BID. Maintain Tacrolimus 6 mg PO BID; Continue with Diabetic teaching. Endo following for hyperglycemia; Lantus increased to 13 units HA and pre meal increased to 8 units TID.  5/26 VVS; Continue with current medication regimen.  Monitor leukopenia WBC 3.1.  Awaiting HCV viral load. Hypotension SBP 90's, Norvasc and HCTZ D/C'd. Urine culture with Klebsiella oxytoca; patient asymptomatic per ID no abx warranted at this time--> continue to monitor.  Progaf level 15; continue with current dose. Supplement potassium 40 meq PO x 1 for K+ 3.8.    5/27: HCV viral load/CMV PCR pending.  s/p Fall from chair. Bed Alarm in place  5/28 VSS - no complaints - Prograf level = 11.1  continue Tacro@6 BID  5/29: HCV viral load/CMV PCR pending.  Peripheral IV replaced.   Disposition: Home once medically cleared    5/30 cmv, hep c neg.  Pt w small hematoma on the surface of his abd. derm consulted, cultures sent  Insulin teaching in progress.  5/31/18-D/c planning ? home friday 6/2     VSS   prograf dose increased to 8 mg  bid,  discharge cx  secobdary to pt " has no ride"  and home care asst   not set up,  6/3   wct   obtaining echo,   small drop in Hct   obtaining abd ct to assess  for bleed, bs  60  lantus decreased  6/4  R and L heart catheterization in am for Bx and evaluate coronary arteries.  UE duplex --negative for DVT.  CT chest ordered to eval lung nodule.  Tacro level 9.1.  Continue Prograf 7.5 BID --going to get Mayvret tonight.  Hold lovenox tonight and in am for cath.  6/5 CT Chest: new LLL nodule 2.5x2.5cm likely infectious in nature, small right pleural effusion. Will check fungitell per ID. Tacro level 8.3. Prograf increased to 8 BID. OK to continue off AC (SQ Lovenox) per Vascular Cardiology - Dr. Tavarez. F/U cardiac biopsy results and dc home in AM.  6/6 Pt cleared for discharge home today. Continue prograf at 8 BID and prednisone at 7.5 BID.

## 2018-06-01 NOTE — DISCHARGE NOTE ADULT - ADDITIONAL INSTRUCTIONS
YOU WILL NEED TO FOLLOW UP WITH THE HEART FAILURE TEAM AS SCHEDULED.  YOUR BIOPSY WILL BE NEXT WEEK    YOU WILL NEED TO FOLLOW UP WITH THE ENDOCRINOLOGY EDUCATOR ON JUNE 11 AT 3:30AT [address] [facility] [address]     YOU HAVE AN APPOINTMENT WITH THE ENDOCRINOLOGIST  ON MONDAY JULY 30 AT 2:30   [facility] [address] , [phone] YOU WILL NEED TO FOLLOW UP WITH THE HEART FAILURE TEAM AS SCHEDULED.  YOUR BIOPSY WILL BE NEXT WEEK    YOU WILL NEED TO FOLLOW UP WITH THE ENDOCRINOLOGY EDUCATOR ON   JUNE 11 AT 3:30   [facility] [address]     YOU WILL NEED TO FOLLOW UP WITH ENDOCRINE ON   JUNE 21 AT 10:40 AM   ,[address]     YOU HAVE AN APPOINTMENT WITH THE ENDOCRINOLOGIST  ON MONDAY JULY 30 AT 2:30     [facility] [address] , [phone] YOU WILL NEED TO FOLLOW UP WITH THE HEART FAILURE TEAM AS SCHEDULED.  YOUR BIOPSY WILL BE NEXT WEEK    YOU WILL NEED TO FOLLOW UP WITH THE ENDOCRINOLOGY EDUCATOR ON   JUNE 11 AT 3:30   [facility] [address]     YOU WILL NEED TO FOLLOW UP WITH ENDOCRINE ON   JUNE 21 AT 10:40 AM   ,3 [facility] ,[address] [place] [address]     YOU HAVE AN APPOINTMENT WITH THE ENDOCRINOLOGIST  ON MONDAY JULY 30 AT 2:30     [facility] [address] , [phone]  Follow up with Dermatology 2 weeks  re:  Bulla YOU WILL NEED TO FOLLOW UP WITH THE HEART FAILURE TEAM AS SCHEDULED.  YOUR BIOPSY WILL BE wednesday   YOU WILL NEED TO FOLLOW UP WITH THE ENDOCRINOLOGY EDUCATOR ON   JUNE 11 AT 3:30   [facility] [address]     YOU WILL NEED TO FOLLOW UP WITH ENDOCRINE ON   JUNE 21 AT 10:40 AM   ,3 [facility] ,[address] [place] [address] DRIVE     YOU HAVE AN APPOINTMENT WITH THE ENDOCRINOLOGIST  ON MONDAY JULY 30 AT 2:30     [facility] [address] , [phone]  Follow up with Dermatology 2 weeks  re:  Bulla on abdomen FOLLOW UP WITH THE HEART FAILURE TEAM AS SCHEDULED.   Follow up with Dermatology, Dr. Shrestha in 2 weeks regarding Bulla on abdomen. Call the office [phone] to schedule appointment.  Follow up with Endocrine educator on Monday JUNE 11 AT 3:30pm at [address] [facility] [address]. Call [phone] to confirm appointment.  Follow up with ENDOCRINE clinic on Thursday JUNE 21st at 10:40AM at [address] [facility] [facility]. Call [phone] to confirm appointment.  Follow up with ENDOCRINOLOGIST, Dr. Antonio on MONDAY JULY 30 AT 2:30pm  [facility] [address]. Call [phone] to confirm appointment. 1. Follow up with Dermatology, Dr. Shrestha in 2 weeks regarding Bulla on abdomen. Call the office [phone] to schedule appointment.  2. Follow up with Endocrine educator on Monday JUNE 11 AT 3:30pm at [address] [facility] [address]. Call [phone] to confirm appointment.  3. Follow up with Endocrine clinic on Thursday JUNE 21st at 10:40AM at [address]. Call [phone] to confirm appointment.  4. Follow up with Heart Transplant Cardiology, Dr. Edil Stahl on June 21st at 12pm at [address]. Call [phone] to confirm appointment.  5. Follow up with ENDOCRINOLOGIST, Dr. Antonio on MONDAY JULY 30 AT 2:30pm  [facility] [address]. Call [phone] to confirm appointment.

## 2018-06-01 NOTE — DISCHARGE NOTE ADULT - OTHER SIGNIFICANT FINDINGS
S  COR  MT  Lungs  GI     EXT S   I feel we,  No chest pain or SOB"  COR   RRR  MT   healed  Lungs  CTA  GI     soft  ND   NT  abdominal lesion non tender  EXT  no edema   no cald tenderness   good pulses S   "I feel well,  No chest pain or SOB"  COR   RRR  MT   healed  Lungs  CTA  GI     soft  ND   NT  abdominal lesion non tender  EXT  no edema   no cald tenderness   good pulses, rt pinky dressing PHYSICAL EXAM  Neurology: A&Ox3, nonfocal, no gross deficits  CV : RRR+S1S2  Sternal Wound: MSI healed  Lungs: Respirations non-labored, B/L BS CTA  Abdomen: Soft, NT/ND, +BSx4Q, (-)N/V/D  : Voiding without difficulty  Extremities: B/L LE no edema, negative calf tenderness, +PP       More than 30 mins spent on total encounter, patient counseling and discharge instructions.

## 2018-06-01 NOTE — PROGRESS NOTE ADULT - SUBJECTIVE AND OBJECTIVE BOX
Diabetes Follow up note:  Interval Hx: 69 y/o M w/h/o HTN/CHF/STV/Tony Fib>AICD/PAF/NICM>LVAD and more recently heart tx c/b DVT on Lovenox. Here with NODAT requiring insulin. Tolerating POs. BG mostly at goal without hypoglycemia. Pt states he is participating on own diabetes care under RN supervision and feels confident to go home and continue this care. Going home tomorrow    Review of Systems:  General: "I want to go home"  GI: Tolerating POs without any N/V/D/ABD PAIN.  CV: No CP/SOB  ENDO: No S&Sx of hypoglycemia      MEDS:  atorvastatin 10 milliGRAM(s) Oral at bedtime  insulin glargine Injectable (LANTUS) 13 Unit(s) SubCutaneous at bedtime  insulin lispro (HumaLOG) corrective regimen sliding scale   SubCutaneous three times a day before meals  insulin lispro (HumaLOG) corrective regimen sliding scale   SubCutaneous at bedtime  insulin lispro Injectable (HumaLOG) 10 Unit(s) SubCutaneous before breakfast  insulin lispro Injectable (HumaLOG) 8 Unit(s) SubCutaneous before lunch  insulin lispro Injectable (HumaLOG) 8 Unit(s) SubCutaneous before dinner  predniSONE   Tablet 7.5 milliGRAM(s) Oral two times a day  atovaquone Suspension 1500 milliGRAM(s) Oral daily  nystatin    Suspension 970158 Unit(s) Oral every 6 hours  valGANciclovir 450 milliGRAM(s) Oral every 12 hours  tacrolimus 7 milliGRAM(s) Oral <User Schedule>      MEDICATIONS  (PRN):    Allergies    No Known Allergies      PE:  General: Male sitting in chair in NAD.   Vital Signs Last 24 Hrs  T(C): 36.7 (06-01-18 @ 11:20), Max: 36.9 (05-31-18 @ 23:45)  T(F): 98.1 (06-01-18 @ 11:20), Max: 98.4 (05-31-18 @ 23:45)  HR: 102 (06-01-18 @ 11:20) (95 - 102)  BP: 103/71 (06-01-18 @ 11:20) (99/71 - 121/80)  BP(mean): 95 (06-01-18 @ 03:46) (81 - 95)  RR: 18 (06-01-18 @ 11:20) (18 - 18)  SpO2: 99% (06-01-18 @ 11:20) (97% - 99%)  Chest: Midsternal incision, healed  Abd: Soft, NT, ND   Extremities: Warm, no edema noted in all 4 exts  Neuro: A&O X3    LABS:  POCT Blood Glucose.: 263 mg/dL (06-01-18 @ 11:34)  POCT Blood Glucose.: 105 mg/dL (06-01-18 @ 07:18)  POCT Blood Glucose.: 97 mg/dL (05-31-18 @ 21:14)  POCT Blood Glucose.: 100 mg/dL (05-31-18 @ 16:35)  POCT Blood Glucose.: 168 mg/dL (05-31-18 @ 12:00)  POCT Blood Glucose.: 124 mg/dL (05-31-18 @ 07:11)  POCT Blood Glucose.: 99 mg/dL (05-30-18 @ 21:38)  POCT Blood Glucose.: 93 mg/dL (05-30-18 @ 16:23)                            9.0    3.1   )-----------( 311      ( 01 Jun 2018 07:25 )             28.2       06-01    138  |  103  |  16  ----------------------------<  93  4.1   |  24  |  1.13    Ca    8.9      01 Jun 2018 07:25    TPro  5.7<L>  /  Alb  3.2<L>  /  TBili  0.9  /  DBili  x   /  AST  23  /  ALT  25  /  AlkPhos  58  06-01    Thyroid Function Tests:  05-24 @ 05:48 TSH 0.86 FreeT4 -- T3 61 Anti TPO -- Anti Thyroglobulin Ab -- TSI --      Hemoglobin A1C, Whole Blood: 10.4 % <H> [4.0 - 5.6] (05-23-18 @ 20:06)  Hemoglobin A1C, Whole Blood: 5.3 % [4.0 - 5.6] (03-18-18 @ 11:20)

## 2018-06-01 NOTE — PROGRESS NOTE ADULT - SUBJECTIVE AND OBJECTIVE BOX
INTERVAL HPI/OVERNIGHT EVENTS:  Blister fluid cx is NGTD.  Team called to evaluate 2 lesions on b/l arms. Unclear duration but may have been present for ~ 1 week. One of them was mildly itchy.    MEDICATIONS  (STANDING):  aspirin enteric coated 81 milliGRAM(s) Oral daily  atorvastatin 10 milliGRAM(s) Oral at bedtime  atovaquone Suspension 1500 milliGRAM(s) Oral daily  calcium carbonate 1250 mG + Vitamin D (OsCal 500 + D) 2 Tablet(s) Oral two times a day  diltiazem    milliGRAM(s) Oral daily  enoxaparin Injectable 70 milliGRAM(s) SubCutaneous two times a day  famotidine    Tablet 20 milliGRAM(s) Oral two times a day  insulin glargine Injectable (LANTUS) 13 Unit(s) SubCutaneous at bedtime  insulin lispro (HumaLOG) corrective regimen sliding scale   SubCutaneous at bedtime  insulin lispro (HumaLOG) corrective regimen sliding scale   SubCutaneous three times a day before meals  insulin lispro Injectable (HumaLOG) 10 Unit(s) SubCutaneous before breakfast  insulin lispro Injectable (HumaLOG) 8 Unit(s) SubCutaneous before lunch  insulin lispro Injectable (HumaLOG) 8 Unit(s) SubCutaneous before dinner  magnesium oxide 400 milliGRAM(s) Oral every 12 hours  mavyret 100mg/40mg  tablet 3 Tablet(s) 3 Tablet(s) Oral daily  multivitamin 1 Tablet(s) Oral daily  mycophenolate mofetil 500 milliGRAM(s) Oral <User Schedule>  nystatin    Suspension 292148 Unit(s) Oral every 6 hours  predniSONE   Tablet 7.5 milliGRAM(s) Oral two times a day  silver sulfADIAZINE 1% Cream 1 Application(s) Topical two times a day  sodium bicarbonate 1300 milliGRAM(s) Oral every 8 hours  sodium chloride 0.9% lock flush 3 milliLiter(s) IV Push every 8 hours  tacrolimus 7 milliGRAM(s) Oral <User Schedule>  valGANciclovir 450 milliGRAM(s) Oral every 12 hours    MEDICATIONS  (PRN):      Allergies    No Known Allergies    Intolerances        REVIEW OF SYSTEMS      General: no fevers/chills, no NS	    Skin: see HPI  	  Ophthalmologic: no eye pain or change in vision    Genitourinary: no dysuria or hematuria    Musculoskeletal: no joint pains or weakness	    Neurological:no weakness or tingling          Vital Signs Last 24 Hrs  T(C): 36.9 (01 Jun 2018 20:14), Max: 36.9 (31 May 2018 23:45)  T(F): 98.4 (01 Jun 2018 20:14), Max: 98.4 (31 May 2018 23:45)  HR: 99 (01 Jun 2018 20:14) (95 - 102)  BP: 108/77 (01 Jun 2018 20:14) (99/71 - 121/80)  BP(mean): 89 (01 Jun 2018 20:14) (81 - 95)  RR: 18 (01 Jun 2018 20:14) (18 - 18)  SpO2: 99% (01 Jun 2018 20:14) (98% - 99%)    PHYSICAL EXAM:   The patient was alert and oriented X 3, well nourished, and in no apparent distress.  There was no visible lymphadenopathy.  Conjunctiva were non injected  There was no clubbing or edema of extremities.  There was no hyperhidrosis or bromhidrosis.    Of note on skin exam:   flaccid hemorrhagic bulla on mid abdomen  single papule with hemorrhagic crust on R dorsal thumb and another on L forearm    LABS:                        9.0    3.1   )-----------( 311      ( 01 Jun 2018 07:25 )             28.2     06-01    138  |  103  |  16  ----------------------------<  93  4.1   |  24  |  1.13    Ca    8.9      01 Jun 2018 07:25    TPro  5.7<L>  /  Alb  3.2<L>  /  TBili  0.9  /  DBili  x   /  AST  23  /  ALT  25  /  AlkPhos  58  06-01    PT/INR - ( 31 May 2018 16:19 )   PT: 11.3 sec;   INR: 1.04 ratio         PTT - ( 31 May 2018 16:19 )  PTT:36.5 sec

## 2018-06-01 NOTE — DISCHARGE NOTE ADULT - MEDICATION SUMMARY - MEDICATIONS TO CHANGE
I will SWITCH the dose or number of times a day I take the medications listed below when I get home from the hospital:  None I will SWITCH the dose or number of times a day I take the medications listed below when I get home from the hospital:    tacrolimus  -- 7 milligram(s) by mouth 2 times a day at 0800 and 2000

## 2018-06-01 NOTE — DISCHARGE NOTE ADULT - CARE PROVIDERS DIRECT ADDRESSES
,DirectAddress_Unknown,[email or web address],DirectAddress_Unknown ,DirectAddress_Unknown,[email or web address],DirectAddress_Unknown,[email or web address]

## 2018-06-01 NOTE — PROGRESS NOTE ADULT - SUBJECTIVE AND OBJECTIVE BOX
INFECTIOUS DISEASES FOLLOW UP-- Sujey Lujan  [phone]    This is a follow up note for this  68yMale with  Hyperglycemia, feels well, cultures from his abdominal hemorrhagic blister are all NGTD      ROS:  CONSTITUTIONAL:  No fever, good appetite  CARDIOVASCULAR:  No chest pain or palpitations  RESPIRATORY:  No dyspnea  GASTROINTESTINAL:  No nausea, vomiting, diarrhea, or abdominal pain  GENITOURINARY:  No dysuria  NEUROLOGIC:  No headache,     Allergies    No Known Allergies    Intolerances        ANTIBIOTICS/RELEVANT:  antimicrobials  atovaquone Suspension 1500 milliGRAM(s) Oral daily  nystatin    Suspension 276982 Unit(s) Oral every 6 hours  valGANciclovir 450 milliGRAM(s) Oral every 12 hours    immunologic:  mycophenolate mofetil 500 milliGRAM(s) Oral <User Schedule>  tacrolimus 7 milliGRAM(s) Oral <User Schedule>    OTHER:  aspirin enteric coated 81 milliGRAM(s) Oral daily  atorvastatin 10 milliGRAM(s) Oral at bedtime  calcium carbonate 1250 mG + Vitamin D (OsCal 500 + D) 2 Tablet(s) Oral two times a day  diltiazem    milliGRAM(s) Oral daily  enoxaparin Injectable 70 milliGRAM(s) SubCutaneous two times a day  famotidine    Tablet 20 milliGRAM(s) Oral two times a day  insulin glargine Injectable (LANTUS) 13 Unit(s) SubCutaneous at bedtime  insulin lispro (HumaLOG) corrective regimen sliding scale   SubCutaneous at bedtime  insulin lispro (HumaLOG) corrective regimen sliding scale   SubCutaneous three times a day before meals  insulin lispro Injectable (HumaLOG) 10 Unit(s) SubCutaneous before breakfast  insulin lispro Injectable (HumaLOG) 8 Unit(s) SubCutaneous before lunch  insulin lispro Injectable (HumaLOG) 8 Unit(s) SubCutaneous before dinner  magnesium oxide 400 milliGRAM(s) Oral every 12 hours  mavyret 100mg/40mg  tablet 3 Tablet(s) 3 Tablet(s) Oral daily  multivitamin 1 Tablet(s) Oral daily  predniSONE   Tablet 7.5 milliGRAM(s) Oral two times a day  silver sulfADIAZINE 1% Cream 1 Application(s) Topical two times a day  sodium bicarbonate 1300 milliGRAM(s) Oral every 8 hours  sodium chloride 0.9% lock flush 3 milliLiter(s) IV Push every 8 hours      Objective:  Vital Signs Last 24 Hrs  T(C): 36.7 (01 Jun 2018 16:19), Max: 36.9 (31 May 2018 23:45)  T(F): 98 (01 Jun 2018 16:19), Max: 98.4 (31 May 2018 23:45)  HR: 101 (01 Jun 2018 16:19) (95 - 102)  BP: 101/73 (01 Jun 2018 16:19) (99/71 - 121/80)  BP(mean): 82 (01 Jun 2018 16:19) (81 - 95)  RR: 18 (01 Jun 2018 16:19) (18 - 18)  SpO2: 99% (01 Jun 2018 16:19) (97% - 99%)    PHYSICAL EXAM:  Constitutional:no acute distress  Eyes:WALT, EOMI  Ear/Nose/Throat: no oral lesions, 	  Respiratory: clear BL decreased right base  Cardiovascular: S1S2  Gastrointestinal:soft, (+) BS, no tenderness blood blister under umbilicus  Extremities:no e/e/c  No Lymphadenopathy  IV sites not inflammed.    LABS:                        9.0    3.1   )-----------( 311      ( 01 Jun 2018 07:25 )             28.2     06-01    138  |  103  |  16  ----------------------------<  93  4.1   |  24  |  1.13    Ca    8.9      01 Jun 2018 07:25    TPro  5.7<L>  /  Alb  3.2<L>  /  TBili  0.9  /  DBili  x   /  AST  23  /  ALT  25  /  AlkPhos  58  06-01    PT/INR - ( 31 May 2018 16:19 )   PT: 11.3 sec;   INR: 1.04 ratio         PTT - ( 31 May 2018 16:19 )  PTT:36.5 sec      MICROBIOLOGY:            RECENT CULTURES:  05-29 @ 19:07  Skin Skin, abdomen  --  --  --    No growth at 48 hours  --      RADIOLOGY & ADDITIONAL STUDIES:    < from: Xray Chest 1 View- PORTABLE-Urgent (05.23.18 @ 20:32) >    IMPRESSION:    Right lower lung patchy opacities, some of which partially obscure the   right heart are likely due to atelectasis.      < end of copied text >

## 2018-06-01 NOTE — PROGRESS NOTE ADULT - SUBJECTIVE AND OBJECTIVE BOX
Learner: Patient  Barriers: Reading/memory    Patient post cardiac transplant.     Method used: Verbal discussion and written materials.    Medication safety was discussed with the patient: allergies, herbals, adherence, interactions, medication precautions, miss dose instructions, purpose, side effects, signs and symptoms to report, and storage & handling. Reinforced previous thought information.     Patient was able to repeat and verbalize key points.    Education Summary: Discharge immunosuppressant medications and prophylatic anti-infective agents reviewed with the patient. Outpatient medication schedule was discussed in detail including: medication name, indication, dose, administration times, treatment duration, side effects, drug interactions, and special instructions. Patient questions and concerns were answered and addressed. Patient demonstrated understanding. The patient's family is coming tomorrow around 1-1:30 PM. Will come by to speak to Mr. Baez's brother to assure that they know how to inject insulin and how to check blood sugar. In addition, the Lovenox dose was changed from 80 mG (previous home dose) to 60 mG; therefore, I will educate the family to assure that they understand what dose/syringe need to be used.     Time spent discharge education: 90 minutes    MEDICATIONS  (STANDING):  aspirin enteric coated 81 milliGRAM(s) Oral daily  atorvastatin 10 milliGRAM(s) Oral at bedtime  atovaquone Suspension 1500 milliGRAM(s) Oral daily  calcium carbonate 1250 mG + Vitamin D (OsCal 500 + D) 2 Tablet(s) Oral two times a day  diltiazem    milliGRAM(s) Oral daily  enoxaparin Injectable 70 milliGRAM(s) SubCutaneous two times a day  famotidine    Tablet 20 milliGRAM(s) Oral two times a day  insulin glargine Injectable (LANTUS) 13 Unit(s) SubCutaneous at bedtime  insulin lispro (HumaLOG) corrective regimen sliding scale   SubCutaneous at bedtime  insulin lispro (HumaLOG) corrective regimen sliding scale   SubCutaneous three times a day before meals  insulin lispro Injectable (HumaLOG) 10 Unit(s) SubCutaneous before breakfast  insulin lispro Injectable (HumaLOG) 8 Unit(s) SubCutaneous before lunch  insulin lispro Injectable (HumaLOG) 8 Unit(s) SubCutaneous before dinner  magnesium oxide 400 milliGRAM(s) Oral every 12 hours  mavyret 100mg/40mg  tablet 3 Tablet(s) 3 Tablet(s) Oral daily  multivitamin 1 Tablet(s) Oral daily  mycophenolate mofetil 500 milliGRAM(s) Oral <User Schedule>  nystatin    Suspension 239657 Unit(s) Oral every 6 hours  predniSONE   Tablet 7.5 milliGRAM(s) Oral two times a day  silver sulfADIAZINE 1% Cream 1 Application(s) Topical two times a day  sodium bicarbonate 1300 milliGRAM(s) Oral every 8 hours  sodium chloride 0.9% lock flush 3 milliLiter(s) IV Push every 8 hours  tacrolimus 7 milliGRAM(s) Oral <User Schedule>  valGANciclovir 450 milliGRAM(s) Oral every 12 hours    MEDICATIONS  (PRN):      Cardiac Transplant Medications:  Tacrolimus adjusted to trough (current dose was changed to 7 mG twice a day; 1 mG was given extra during a day)  Mycophenolate mofetil 500 mg PO twice a day  Prednisone taper (current dose is 7.5 mG PO twice a day)  Atovaquone 1,500 mG (10 mL) PO daily   Nystatin swish and swallow four times a day  Valganciclovir 450 mg PO daily   Docusate  Famotidine 20 mG PO twice a day (the patient will go home on once a day regiment since renal function fluctuates)  Aspirin 81 mG PO daily  Atorvastatin 10 mG PO daily (changed from pravastatin)                         9.0    3.1   )-----------( 311      ( 01 Jun 2018 07:25 )             28.2     06-01    138  |  103  |  16  ----------------------------<  93  4.1   |  24  |  1.13    Ca    8.9      01 Jun 2018 07:25    TPro  5.7<L>  /  Alb  3.2<L>  /  TBili  0.9  /  DBili  x   /  AST  23  /  ALT  25  /  AlkPhos  58  06-01    LIVER FUNCTIONS - ( 01 Jun 2018 07:25 )  Alb: 3.2 g/dL / Pro: 5.7 g/dL / ALK PHOS: 58 U/L / ALT: 25 U/L / AST: 23 U/L / GGT: x           Tacrolimus (), Serum: 8.7 ng/mL (06-01-18 @ 07:58)  Tacrolimus (), Serum: 11.0 ng/mL (05-31-18 @ 07:49)  Tacrolimus (), Serum: 9.5 ng/mL (05-30-18 @ 09:30)

## 2018-06-01 NOTE — DISCHARGE NOTE ADULT - PROVIDER TOKENS
TOKEN:'[id number]',TOKEN:'[id number]:'[id number]' TOKEN:'[id number]:'[id number]:'[id number]',TOKEN:'[id number]'

## 2018-06-01 NOTE — PROGRESS NOTE ADULT - SUBJECTIVE AND OBJECTIVE BOX
Interval History:  doing well  would like to go home; [facility] has enough support at home to supervise insulin injections and BG monitoring  denies any issues    Medications:  aspirin enteric coated 81 milliGRAM(s) Oral daily  atorvastatin 10 milliGRAM(s) Oral at bedtime  atovaquone Suspension 1500 milliGRAM(s) Oral daily  calcium carbonate 1250 mG + Vitamin D (OsCal 500 + D) 2 Tablet(s) Oral two times a day  diltiazem    milliGRAM(s) Oral daily  enoxaparin Injectable 70 milliGRAM(s) SubCutaneous two times a day  famotidine    Tablet 20 milliGRAM(s) Oral two times a day  insulin glargine Injectable (LANTUS) 13 Unit(s) SubCutaneous at bedtime  insulin lispro (HumaLOG) corrective regimen sliding scale   SubCutaneous at bedtime  insulin lispro (HumaLOG) corrective regimen sliding scale   SubCutaneous three times a day before meals  insulin lispro Injectable (HumaLOG) 10 Unit(s) SubCutaneous before breakfast  insulin lispro Injectable (HumaLOG) 8 Unit(s) SubCutaneous before lunch  insulin lispro Injectable (HumaLOG) 8 Unit(s) SubCutaneous before dinner  magnesium oxide 400 milliGRAM(s) Oral every 12 hours  mavyret 100mg/40mg  tablet 3 Tablet(s) 3 Tablet(s) Oral daily  multivitamin 1 Tablet(s) Oral daily  mycophenolate mofetil 500 milliGRAM(s) Oral <User Schedule>  nystatin    Suspension 374991 Unit(s) Oral every 6 hours  predniSONE   Tablet 7.5 milliGRAM(s) Oral two times a day  silver sulfADIAZINE 1% Cream 1 Application(s) Topical two times a day  sodium bicarbonate 1300 milliGRAM(s) Oral every 8 hours  sodium chloride 0.9% lock flush 3 milliLiter(s) IV Push every 8 hours  tacrolimus 7 milliGRAM(s) Oral <User Schedule>  valGANciclovir 450 milliGRAM(s) Oral every 12 hours      Vitals:  T(C): 36.9 (18 @ 20:14), Max: 36.9 (18 @ 23:45)  HR: 99 (18 @ 20:14) (95 - 102)  BP: 108/77 (18 @ 20:14) (99/71 - 121/80)  BP(mean): 89 (18 @ 20:14) (81 - 95)  RR: 18 (18 @ 20:14) (18 - 18)  SpO2: 99% (18 @ 20:14) (98% - 99%)    Daily     Daily Weight in k.2 (2018 13:56)    I&O's Summary    31 May 2018 07:  -  2018 07:00  --------------------------------------------------------  IN: 720 mL / OUT: 1650 mL / NET: -930 mL    2018 07:  -  2018 22:46  --------------------------------------------------------  IN: 840 mL / OUT: 800 mL / NET: 40 mL    Physical Exam:  Appearance: No Acute Distress  HEENT: No JVD  Cardiovascular: Normal S1 S2, No murmurs/rubs/gallops  Respiratory: Clear to auscultation bilaterally  Gastrointestinal: Soft, Non-tender	  Skin: No cyanosis; hemorrhagic crust on arms; bullae on abdomen 	  Neurologic: Non-focal  Extremities: No LE edema  Psychiatry: A & O x 3, Mood & affect appropriate    Labs:                        9.0    3.1   )-----------( 311      ( 2018 07:25 )             28.2         138  |  103  |  16  ----------------------------<  93  4.1   |  24  |  1.13    Ca    8.9      2018 07:25    TPro  5.7<L>  /  Alb  3.2<L>  /  TBili  0.9  /  DBili  x   /  AST  23  /  ALT  25  /  AlkPhos  58  06-    PT/INR - ( 31 May 2018 16:19 )   PT: 11.3 sec;   INR: 1.04 ratio         PTT - ( 31 May 2018 16:19 )  PTT:36.5 sec    TELEMETRY  no event

## 2018-06-01 NOTE — PROGRESS NOTE ADULT - PROBLEM SELECTOR PLAN 1
DISCHARGE:  -test BG AC/HS.   -c/w Lantus 13 units QHS. If FBG <100 tomorrow can decrease Lantus to 12 units   -c/w Humalog 10-8-8 w/meals. Upon discharge can take 8 units ac meals to avoid confusion with dosing.  -Continue with Humalog low correction scale AC and Low HS scale while in hospital only  -Will need home care to reinforce education. Brother might be able to assist pt at home.  -Has f/u apt at endo clinic on 6/21/18 at 10:40am . [facility]  -Plan discussed with pt/team.  Contact info: [phone] (24/7). pager [phone]

## 2018-06-01 NOTE — DISCHARGE NOTE ADULT - CARE PLAN
Principal Discharge DX:	H/O heart transplant  Goal:	recovery  Assessment and plan of treatment:	Asa, Statin, B-blocker, Chest PT,  Incentive spirometry, wound care and assessment.  Ambulate  Secondary Diagnosis:	Hyperglycemia  Goal:	glucose control  Assessment and plan of treatment:	Insulin coverage  Insulin teaching  Endocrine f/u Principal Discharge DX:	H/O heart transplant  Goal:	recovery  Assessment and plan of treatment:	Asa, Statin, B-blocker, Chest PT,  Incentive spirometry, wound care and assessment.  Ambulate  Secondary Diagnosis:	Hyperglycemia  Goal:	glucose control  Assessment and plan of treatment:	Insulin coverage  Insulin teaching  Endocrine f/u  Secondary Diagnosis:	Bulla  Goal:	healing  Assessment and plan of treatment:	treat w/ medication 2 x day   x 14 days   follow up with dermatology 2 weeks Principal Discharge DX:	H/O heart transplant  Goal:	recovery  Assessment and plan of treatment:	Cardiac Biopsy to be done Wednesday,  Blood Work to Be done on Monday  Secondary Diagnosis:	Hyperglycemia  Goal:	glucose control  Assessment and plan of treatment:	Insulin coverage  Insulin teaching  Endocrine f/u  Secondary Diagnosis:	Bulla  Goal:	healing  Assessment and plan of treatment:	treat w/ medication 2 x day   x 14 days   follow up with dermatology 2 weeks Principal Discharge DX:	H/O heart transplant  Goal:	recovery  Assessment and plan of treatment:	Cardiac Biopsy to be done Wednesday, Please have  Blood Work to Be done on Monday  Secondary Diagnosis:	Hyperglycemia  Goal:	glucose control  Assessment and plan of treatment:	Insulin coverage  Insulin teaching  Endocrine f/u  Secondary Diagnosis:	Bulla  Goal:	healing  Assessment and plan of treatment:	treat w/ medication 2 x day   x 14 days   follow up with dermatology 2 weeks Principal Discharge DX:	H/O heart transplant  Goal:	complete recovery  Assessment and plan of treatment:	1. Daily Shower  2. Weight yourself daily and notify any weight gain greater than 2-3 pounds in 24 hours.  3. Diabetic diet - low fat, low cholesterol, no added salt.  4. Increase Activity as tolerated.   5. Take medications on time as directed by MD.  Secondary Diagnosis:	Hyperglycemia  Goal:	glucose control  Assessment and plan of treatment:	Check fingersticks 4x daily before meals and at bedtime. Record levels  Take insulin as directed.  Secondary Diagnosis:	Bulla  Goal:	healing  Assessment and plan of treatment:	treat w/ bactroban twice daily x 14 days,   follow up with dermatology 2 weeks

## 2018-06-01 NOTE — DISCHARGE NOTE ADULT - INSTRUCTIONS
Regular no added salt diet Regular    diabetic   no added salt diet Diabetic diet - low fat, low cholesterol, no added salt.

## 2018-06-01 NOTE — DISCHARGE NOTE ADULT - CARE PROVIDER_API CALL
Ti Parker (MD), Thoracic and Cardiac Surgery  [address] [place] [address]  Phone: [phone]  Fax: [phone]    Edil Stahl (MD; MPH), Adv Heart Fail Trnsplnt Cardio; Cardiology  [address] [place] [address]  Phone: [phone]  Fax: [phone]    Tim Antonio (), Internal Medicine  [address] [address]  Phone: [phone]  Fax: [phone] Ti Parker), Thoracic and Cardiac Surgery  [address] [place] [address]  Phone: [phone]  Fax: [phone]    Edil Stahl (MD; MPH), Adv Heart Fail Trnsplnt Cardio; Cardiology  [address] [place] [address]  Phone: [phone]  Fax: [phone]    Tim Antonio (), Internal Medicine  [address] [address]  Phone: [phone]  Fax: [phone]    Paulette Shrestha), Dermatology  [address] [facility]  [address]  [place] [address]  Phone: [phone]  Fax: [phone]

## 2018-06-01 NOTE — DISCHARGE NOTE ADULT - PLAN OF CARE
recovery Asa, Statin, B-blocker, Chest PT,  Incentive spirometry, wound care and assessment.  Ambulate glucose control Insulin coverage  Insulin teaching  Endocrine f/u healing treat w/ medication 2 x day   x 14 days   follow up with dermatology 2 weeks Cardiac Biopsy to be done Wednesday,  Blood Work to Be done on Monday Cardiac Biopsy to be done Wednesday, Please have  Blood Work to Be done on Monday complete recovery 1. Daily Shower  2. Weight yourself daily and notify any weight gain greater than 2-3 pounds in 24 hours.  3. Diabetic diet - low fat, low cholesterol, no added salt.  4. Increase Activity as tolerated.   5. Take medications on time as directed by MD. Check fingersticks 4x daily before meals and at bedtime. Record levels  Take insulin as directed. treat w/ bactroban twice daily x 14 days,   follow up with dermatology 2 weeks

## 2018-06-01 NOTE — PROGRESS NOTE ADULT - ASSESSMENT
ASSESSMENT AND PLAN:     69 yo M s/p heart transplant on pred, MMF, Prograf, IVIG/rituximab and hep C with a single hemorrhagic bulla on mid abdomen and 2 papules with hemorrhagic crust on upper extremities.     Bacterial/AFB/fungal cx from fluid from the bulla on mid abdomen is NGTD.  These skin lesions are non-inflammatory bulla/papules with no tenderness and absence of progression. Pt has not developed fever, malaise or other symptoms of infection during the hospitalization.    Etiology of the lesions does not appear to be infectious. I still favor local iatrogenic trauma, such as Lovenox injection and IV insertion, leading to these hemorrhagic lesions.    - Please email [email or web address] with pt's name, , and phone number to make a follow-up appointment with us  - Pt can be discharged home with mupirocin 2% ointment BID to be applied to the skin lesions for 2 weeks       Noel Menezes MD  Resident Physician, PGY-3  Department of Dermatology  [facility]  Pager: [phone]  Office: [phone] ASSESSMENT AND PLAN:     67 yo M s/p heart transplant on pred, MMF, Prograf, IVIG/rituximab and hep C with a single hemorrhagic bulla on mid abdomen and 2 papules with hemorrhagic crust on upper extremities.     Bacterial/AFB/fungal cx from fluid from the bulla on mid abdomen is NGTD.  These skin lesions are non-inflammatory bulla/papules with no tenderness and absence of progression. Pt has not developed fever, malaise or other symptoms of infection during the hospitalization.    Etiology of the lesions does not appear to be infectious. We favor local iatrogenic trauma, such as Lovenox injection and IV insertion, leading to these hemorrhagic lesions.    - Please email [email or web address] with pt's name, , and phone number to make a follow-up appointment with us  - Pt can be discharged home with mupirocin 2% ointment BID to be applied to the skin lesions for 2 weeks       Noel Menezes MD  Resident Physician, PGY-3  Department of Dermatology  [facility]  Pager: [phone]  Office: [phone]

## 2018-06-01 NOTE — CHART NOTE - NSCHARTNOTEFT_GEN_A_CORE
Source: Patient [ x ]    Family [ ]     other [ x ] RN, Comprehensive chart review     Pt seen for nutrition follow up. Reports good appetite, and eating >% of meals. Reports no GI distress.   Pt initially refused to teach-back therapeutic guidelines for DM, however, upon further conversation stated that he has to "stay away from sugar, white starch, soda, cake, and sweets." Pt with questions regarding juice, instructed pt to avoid/limit juice. Pt somewhat receptive to additional review of diet guidelines for DM, high potassium foods to avoid, heart healthy diet, and food safety for transplant recipients; remained somewhat argumentative and therefore question pt's willingness to strictly adhere to therapeutic diet guidelines- d/w NP.     Pt admitted with hyperglycemia, BC >600 possiblye steroid induced; resolving polyuria, polydipsia, and blurred vision as per chart. Noted insulin teaching ongoing. Pt weaned from insulin infusion, receiving basal/bolus therapy.     Diet : consistent CHO, fiber/residue restricted, no concentrated potassium       Admission Weight: 66.5kg  Current Weight: 69.2kg  Weight fluctuations noted. No peripheral edema noted at this time.     Pertinent Medications: MEDICATIONS  (STANDING):  aspirin enteric coated 81 milliGRAM(s) Oral daily  atorvastatin 10 milliGRAM(s) Oral at bedtime  atovaquone Suspension 1500 milliGRAM(s) Oral daily  calcium carbonate 1250 mG + Vitamin D (OsCal 500 + D) 2 Tablet(s) Oral two times a day  diltiazem    milliGRAM(s) Oral daily  enoxaparin Injectable 70 milliGRAM(s) SubCutaneous two times a day  famotidine    Tablet 20 milliGRAM(s) Oral two times a day  insulin glargine Injectable (LANTUS) 13 Unit(s) SubCutaneous at bedtime  insulin lispro (HumaLOG) corrective regimen sliding scale   SubCutaneous at bedtime  insulin lispro (HumaLOG) corrective regimen sliding scale   SubCutaneous three times a day before meals  insulin lispro Injectable (HumaLOG) 10 Unit(s) SubCutaneous before breakfast  insulin lispro Injectable (HumaLOG) 8 Unit(s) SubCutaneous before lunch  insulin lispro Injectable (HumaLOG) 8 Unit(s) SubCutaneous before dinner  magnesium oxide 400 milliGRAM(s) Oral every 12 hours  mavyret 100mg/40mg  tablet 3 Tablet(s) 3 Tablet(s) Oral daily  multivitamin 1 Tablet(s) Oral daily  mycophenolate mofetil 500 milliGRAM(s) Oral <User Schedule>  nystatin    Suspension 102040 Unit(s) Oral every 6 hours  predniSONE   Tablet 7.5 milliGRAM(s) Oral two times a day  silver sulfADIAZINE 1% Cream 1 Application(s) Topical two times a day  sodium bicarbonate 1300 milliGRAM(s) Oral every 8 hours  sodium chloride 0.9% lock flush 3 milliLiter(s) IV Push every 8 hours  tacrolimus 6.5 milliGRAM(s) Oral <User Schedule>  valGANciclovir 450 milliGRAM(s) Oral every 12 hours    MEDICATIONS  (PRN):    Pertinent Labs:    Complete Blood Count (06.01.18 @ 07:25)    Hemoglobin: 9.0 g/dL - low    Hematocrit: 28.2 % - low  Comprehensive Metabolic Panel (06.01.18 @ 07:25)    Sodium, Serum: 138 mmol/L    Potassium, Serum: 4.1 mmol/L    Chloride, Serum: 103 mmol/L    Carbon Dioxide, Serum: 24 mmol/L    Anion Gap, Serum: 11 mmol/L    Blood Urea Nitrogen, Serum: 16 mg/dL    Creatinine, Serum: 1.13 mg/dL    Glucose, Serum: 93 mg/dL    Calcium, Total Serum: 8.9 mg/dL    Protein Total, Serum: 5.7 g/dL - low    Albumin, Serum: 3.2 g/dL - low    Bilirubin Total, Serum: 0.9 mg/dL    Alkaline Phosphatase, Serum: 58 U/L    Aspartate Aminotransferase (AST/SGOT): 23 U/L    Alanine Aminotransferase (ALT/SGPT): 25 U/L    Point of Care Testing BG: (6/1)105, (5/31)    Skin: No pressure ulcers noted    Estimated Needs:   [ x ] no change since previous assessment  [ ] recalculated:       Previous Nutrition Diagnosis:   Altered nutrition - related laboratory values remains, being addressed with continued diet education.  Unintended weight loss improving, being addressed with good PO intake.         New Nutrition Diagnosis: [ x ] not applicable    Interventions:   1. Encourage PO intake with all meals.   2. Provide food preferences within therapeutic diet when requested.   3. Encourage use of alternate menu items as needed.  4. Reviewed/reinforced therapeutic diet guidelines for heart health, DM, no concentrated potassium, and food safety for transplant recipients.        Monitoring and Evaluation:     [ x ] PO intake [ x ] Tolerance to diet prescription [ x ] weights [ x ] follow up per protocol    [ ] other:

## 2018-06-02 LAB
ALBUMIN SERPL ELPH-MCNC: 3.4 G/DL — SIGNIFICANT CHANGE UP (ref 3.3–5)
ALP SERPL-CCNC: 58 U/L — SIGNIFICANT CHANGE UP (ref 40–120)
ALT FLD-CCNC: 23 U/L — SIGNIFICANT CHANGE UP (ref 10–45)
ANION GAP SERPL CALC-SCNC: 12 MMOL/L — SIGNIFICANT CHANGE UP (ref 5–17)
AST SERPL-CCNC: 18 U/L — SIGNIFICANT CHANGE UP (ref 10–40)
BILIRUB SERPL-MCNC: 1.1 MG/DL — SIGNIFICANT CHANGE UP (ref 0.2–1.2)
BUN SERPL-MCNC: 16 MG/DL — SIGNIFICANT CHANGE UP (ref 7–23)
CALCIUM SERPL-MCNC: 9.1 MG/DL — SIGNIFICANT CHANGE UP (ref 8.4–10.5)
CHLORIDE SERPL-SCNC: 102 MMOL/L — SIGNIFICANT CHANGE UP (ref 96–108)
CO2 SERPL-SCNC: 26 MMOL/L — SIGNIFICANT CHANGE UP (ref 22–31)
CREAT SERPL-MCNC: 1.21 MG/DL — SIGNIFICANT CHANGE UP (ref 0.5–1.3)
GLUCOSE BLDC GLUCOMTR-MCNC: 156 MG/DL — HIGH (ref 70–99)
GLUCOSE BLDC GLUCOMTR-MCNC: 65 MG/DL — LOW (ref 70–99)
GLUCOSE BLDC GLUCOMTR-MCNC: 66 MG/DL — LOW (ref 70–99)
GLUCOSE BLDC GLUCOMTR-MCNC: 73 MG/DL — SIGNIFICANT CHANGE UP (ref 70–99)
GLUCOSE BLDC GLUCOMTR-MCNC: 75 MG/DL — SIGNIFICANT CHANGE UP (ref 70–99)
GLUCOSE BLDC GLUCOMTR-MCNC: 76 MG/DL — SIGNIFICANT CHANGE UP (ref 70–99)
GLUCOSE SERPL-MCNC: 75 MG/DL — SIGNIFICANT CHANGE UP (ref 70–99)
HCT VFR BLD CALC: 29.6 % — LOW (ref 39–50)
HGB BLD-MCNC: 9.3 G/DL — LOW (ref 13–17)
MCHC RBC-ENTMCNC: 30.5 PG — SIGNIFICANT CHANGE UP (ref 27–34)
MCHC RBC-ENTMCNC: 31.5 GM/DL — LOW (ref 32–36)
MCV RBC AUTO: 97 FL — SIGNIFICANT CHANGE UP (ref 80–100)
PLATELET # BLD AUTO: 354 K/UL — SIGNIFICANT CHANGE UP (ref 150–400)
POTASSIUM SERPL-MCNC: 4.1 MMOL/L — SIGNIFICANT CHANGE UP (ref 3.5–5.3)
POTASSIUM SERPL-SCNC: 4.1 MMOL/L — SIGNIFICANT CHANGE UP (ref 3.5–5.3)
PROT SERPL-MCNC: 6.3 G/DL — SIGNIFICANT CHANGE UP (ref 6–8.3)
RBC # BLD: 3.05 M/UL — LOW (ref 4.2–5.8)
RBC # FLD: 20 % — HIGH (ref 10.3–14.5)
SODIUM SERPL-SCNC: 140 MMOL/L — SIGNIFICANT CHANGE UP (ref 135–145)
TACROLIMUS SERPL-MCNC: 7.8 NG/ML — SIGNIFICANT CHANGE UP
WBC # BLD: 3.5 K/UL — LOW (ref 3.8–10.5)
WBC # FLD AUTO: 3.5 K/UL — LOW (ref 3.8–10.5)

## 2018-06-02 PROCEDURE — 99233 SBSQ HOSP IP/OBS HIGH 50: CPT

## 2018-06-02 PROCEDURE — 99232 SBSQ HOSP IP/OBS MODERATE 35: CPT

## 2018-06-02 RX ORDER — TACROLIMUS 5 MG/1
1 CAPSULE ORAL ONCE
Qty: 0 | Refills: 0 | Status: COMPLETED | OUTPATIENT
Start: 2018-06-02 | End: 2018-06-02

## 2018-06-02 RX ORDER — MUPIROCIN 20 MG/G
1 OINTMENT TOPICAL
Qty: 0 | Refills: 0 | Status: DISCONTINUED | OUTPATIENT
Start: 2018-06-02 | End: 2018-06-06

## 2018-06-02 RX ORDER — MUPIROCIN 20 MG/G
2 OINTMENT TOPICAL
Qty: 0 | Refills: 0 | COMMUNITY
Start: 2018-06-02

## 2018-06-02 RX ORDER — INSULIN LISPRO 100/ML
4 VIAL (ML) SUBCUTANEOUS ONCE
Qty: 0 | Refills: 0 | Status: COMPLETED | OUTPATIENT
Start: 2018-06-02 | End: 2018-06-02

## 2018-06-02 RX ORDER — TACROLIMUS 5 MG/1
8 CAPSULE ORAL
Qty: 0 | Refills: 0 | Status: DISCONTINUED | OUTPATIENT
Start: 2018-06-02 | End: 2018-06-03

## 2018-06-02 RX ORDER — MAGNESIUM SULFATE 500 MG/ML
2 VIAL (ML) INJECTION ONCE
Qty: 0 | Refills: 0 | Status: COMPLETED | OUTPATIENT
Start: 2018-06-02 | End: 2018-06-02

## 2018-06-02 RX ORDER — ENOXAPARIN SODIUM 100 MG/ML
12 INJECTION SUBCUTANEOUS
Qty: 1 | Refills: 1 | OUTPATIENT
Start: 2018-06-02 | End: 2018-07-31

## 2018-06-02 RX ORDER — INSULIN GLARGINE 100 [IU]/ML
12 INJECTION, SOLUTION SUBCUTANEOUS AT BEDTIME
Qty: 0 | Refills: 0 | Status: DISCONTINUED | OUTPATIENT
Start: 2018-06-02 | End: 2018-06-03

## 2018-06-02 RX ADMIN — VALGANCICLOVIR 450 MILLIGRAM(S): 450 TABLET, FILM COATED ORAL at 17:16

## 2018-06-02 RX ADMIN — MYCOPHENOLATE MOFETIL 500 MILLIGRAM(S): 250 CAPSULE ORAL at 07:58

## 2018-06-02 RX ADMIN — INSULIN GLARGINE 12 UNIT(S): 100 INJECTION, SOLUTION SUBCUTANEOUS at 23:00

## 2018-06-02 RX ADMIN — Medication 500000 UNIT(S): at 12:17

## 2018-06-02 RX ADMIN — TACROLIMUS 1 MILLIGRAM(S): 5 CAPSULE ORAL at 14:40

## 2018-06-02 RX ADMIN — Medication 1300 MILLIGRAM(S): at 14:40

## 2018-06-02 RX ADMIN — Medication 7.5 MILLIGRAM(S): at 17:15

## 2018-06-02 RX ADMIN — Medication 1300 MILLIGRAM(S): at 22:14

## 2018-06-02 RX ADMIN — ATOVAQUONE 1500 MILLIGRAM(S): 750 SUSPENSION ORAL at 14:38

## 2018-06-02 RX ADMIN — Medication 81 MILLIGRAM(S): at 12:17

## 2018-06-02 RX ADMIN — MAGNESIUM OXIDE 400 MG ORAL TABLET 400 MILLIGRAM(S): 241.3 TABLET ORAL at 17:13

## 2018-06-02 RX ADMIN — Medication 4 UNIT(S): at 12:16

## 2018-06-02 RX ADMIN — Medication 50 GRAM(S): at 20:16

## 2018-06-02 RX ADMIN — Medication 2 TABLET(S): at 17:14

## 2018-06-02 RX ADMIN — Medication 1 TABLET(S): at 12:17

## 2018-06-02 RX ADMIN — ENOXAPARIN SODIUM 70 MILLIGRAM(S): 100 INJECTION SUBCUTANEOUS at 05:29

## 2018-06-02 RX ADMIN — ATORVASTATIN CALCIUM 10 MILLIGRAM(S): 80 TABLET, FILM COATED ORAL at 22:14

## 2018-06-02 RX ADMIN — Medication 1 APPLICATION(S): at 07:58

## 2018-06-02 RX ADMIN — VALGANCICLOVIR 450 MILLIGRAM(S): 450 TABLET, FILM COATED ORAL at 05:30

## 2018-06-02 RX ADMIN — TACROLIMUS 7 MILLIGRAM(S): 5 CAPSULE ORAL at 07:58

## 2018-06-02 RX ADMIN — Medication 7.5 MILLIGRAM(S): at 05:30

## 2018-06-02 RX ADMIN — Medication 1300 MILLIGRAM(S): at 05:30

## 2018-06-02 RX ADMIN — ENOXAPARIN SODIUM 70 MILLIGRAM(S): 100 INJECTION SUBCUTANEOUS at 17:14

## 2018-06-02 RX ADMIN — SODIUM CHLORIDE 3 MILLILITER(S): 9 INJECTION INTRAMUSCULAR; INTRAVENOUS; SUBCUTANEOUS at 05:23

## 2018-06-02 RX ADMIN — Medication 500000 UNIT(S): at 05:30

## 2018-06-02 RX ADMIN — MYCOPHENOLATE MOFETIL 500 MILLIGRAM(S): 250 CAPSULE ORAL at 20:02

## 2018-06-02 RX ADMIN — MAGNESIUM OXIDE 400 MG ORAL TABLET 400 MILLIGRAM(S): 241.3 TABLET ORAL at 05:30

## 2018-06-02 RX ADMIN — SODIUM CHLORIDE 3 MILLILITER(S): 9 INJECTION INTRAMUSCULAR; INTRAVENOUS; SUBCUTANEOUS at 14:40

## 2018-06-02 RX ADMIN — Medication 180 MILLIGRAM(S): at 05:30

## 2018-06-02 RX ADMIN — TACROLIMUS 8 MILLIGRAM(S): 5 CAPSULE ORAL at 20:02

## 2018-06-02 RX ADMIN — Medication 10 UNIT(S): at 07:58

## 2018-06-02 RX ADMIN — FAMOTIDINE 20 MILLIGRAM(S): 10 INJECTION INTRAVENOUS at 17:14

## 2018-06-02 RX ADMIN — MUPIROCIN 1 APPLICATION(S): 20 OINTMENT TOPICAL at 22:14

## 2018-06-02 RX ADMIN — Medication 500000 UNIT(S): at 17:15

## 2018-06-02 RX ADMIN — MUPIROCIN 1 APPLICATION(S): 20 OINTMENT TOPICAL at 14:39

## 2018-06-02 RX ADMIN — Medication 8 UNIT(S): at 17:13

## 2018-06-02 RX ADMIN — FAMOTIDINE 20 MILLIGRAM(S): 10 INJECTION INTRAVENOUS at 05:30

## 2018-06-02 RX ADMIN — Medication 500000 UNIT(S): at 23:00

## 2018-06-02 RX ADMIN — Medication 1 APPLICATION(S): at 17:15

## 2018-06-02 RX ADMIN — SODIUM CHLORIDE 3 MILLILITER(S): 9 INJECTION INTRAMUSCULAR; INTRAVENOUS; SUBCUTANEOUS at 22:13

## 2018-06-02 RX ADMIN — Medication 2 TABLET(S): at 06:47

## 2018-06-02 NOTE — PROGRESS NOTE ADULT - PROBLEM SELECTOR PLAN 1
-humalog dose reduced to 4 units today at lunch x 1 (BG 75)  Discharge plan:  Can be discharged on Lantus 12 units QHS and Humalog 8 units TID w/meals  -Will need home care to reinforce education. Brother might be able to assist pt at home.  -Has f/u apt at endo clinic on 6/21/18 at 10:40am . [address] [facility].  Pt has all relevant glucometer and insulin supplies at bedside  -Plan discussed with pt/team.  pager: [phone]

## 2018-06-02 NOTE — PROGRESS NOTE ADULT - ASSESSMENT
67 year old man with ACC/AHA stage D chronic systolic heart failure due to NICM s/p HM2 LVAD 6/17 c/b pump thrombosis now s/p heart transplant on 2/23 (CMV D-/R+ and Toxo D-/R+), with post-op course c/b primary graft dysfunction, initially thought to be immune-mediated due to positive B-cell flow (negative CDC cross match) and received plasmapharesis/IVIg x2 with improvement in LV function. His course has been complicated by bilateral IJ/subclavian thrombi (on lovenox). Given persistent C4D staining and decline in LV function in April he was treated with plasmapharesis/IVIg and rituxan (received 2 doses, last 5/9) for suspected AMR (noted to have non-HLA Ab to angiotension II). Has since started Mavyret for HCV treatment. Was admitted for hyperglycemia, hyponatremia and acute kidney injury on 5/23 which has improved with insulin therapy. Noted to have papular lesions on his arms and a bullous lesion of the abdomen that were new. Labs also notable for continued leukopenia but improving.     Immunosuppression  - Tacro level 7.7; increase to 8/8 PO   - Continued leukopenia on cellcept 500 mg q12 PO; stable  - on pred 7.5 q12; continue for now    Graft function   - EF 48-50% on 5/24 (unchanged from TTE 5/9)    CAV PPx  - on ASA 81 mg daily  - on lipitor 10 mg daily    Antimicrobial PPx  - on nystatin  - continue mepron 1500 mg daily  - on valcyte 450 mg BID    Diabetes  - new onset likely 2/2 steroids/prograf  - appreciate endo assistance; c/w home DM education  - BGs improved    Renal dysfunction   - improved  - continue monitoring for now  - on sodium bicarb for hyperkalemia which has resolved    Papular lesions  - Appears to have spontaneously occurred, does not appear to be related to trauma or Lovenox injection, unclear etiology; appreciate derm c/s  - coags wnl and factor Xa level 4 hours post next Lovenox dose  - culture results negative; HIV negative    B/L UE DVTs  - Repeat upper extremity Dopplers pending

## 2018-06-02 NOTE — PROGRESS NOTE ADULT - SUBJECTIVE AND OBJECTIVE BOX
Interval History:  doing well  plan was to go home today but home attendant not set up and won't be until Tuesday; pt would like to wait until services reinstituted     Medications:  aspirin enteric coated 81 milliGRAM(s) Oral daily  atorvastatin 10 milliGRAM(s) Oral at bedtime  atovaquone Suspension 1500 milliGRAM(s) Oral daily  calcium carbonate 1250 mG + Vitamin D (OsCal 500 + D) 2 Tablet(s) Oral two times a day  diltiazem    milliGRAM(s) Oral daily  enoxaparin Injectable 70 milliGRAM(s) SubCutaneous two times a day  famotidine    Tablet 20 milliGRAM(s) Oral two times a day  insulin glargine Injectable (LANTUS) 12 Unit(s) SubCutaneous at bedtime  insulin lispro (HumaLOG) corrective regimen sliding scale   SubCutaneous at bedtime  insulin lispro (HumaLOG) corrective regimen sliding scale   SubCutaneous three times a day before meals  insulin lispro Injectable (HumaLOG) 10 Unit(s) SubCutaneous before breakfast  insulin lispro Injectable (HumaLOG) 8 Unit(s) SubCutaneous before lunch  insulin lispro Injectable (HumaLOG) 8 Unit(s) SubCutaneous before dinner  magnesium oxide 400 milliGRAM(s) Oral every 12 hours  mavyret 100mg/40mg  tablet 3 Tablet(s) 3 Tablet(s) Oral daily  multivitamin 1 Tablet(s) Oral daily  mupirocin 2% Ointment 1 Application(s) Topical two times a day  mycophenolate mofetil 500 milliGRAM(s) Oral <User Schedule>  nystatin    Suspension 895489 Unit(s) Oral every 6 hours  predniSONE   Tablet 7.5 milliGRAM(s) Oral two times a day  silver sulfADIAZINE 1% Cream 1 Application(s) Topical two times a day  sodium bicarbonate 1300 milliGRAM(s) Oral every 8 hours  sodium chloride 0.9% lock flush 3 milliLiter(s) IV Push every 8 hours  tacrolimus 8 milliGRAM(s) Oral <User Schedule>  valGANciclovir 450 milliGRAM(s) Oral every 12 hours      Vitals:  T(C): 37.1 (18 @ 19:40), Max: 37.2 (18 @ 03:31)  HR: 108 (18 @ 19:40) (95 - 108)  BP: 116/79 (18 @ 19:40) (98/68 - 116/79)  BP(mean): 88 (18 @ 04:30) (85 - 88)  ABP: --  ABP(mean): --  RR: 18 (18 @ 19:40) (18 - 18)  SpO2: 100% (18 @ 19:40) (98% - 100%)  Wt(kg): --  CVP(cm H2O): --  CO: --  CI: --  PA: --  PA(mean): --  PCWP: --  SVR: --  PVR: --    Daily     Daily Weight in k.6 (2018 05:55)        I&O's Summary    2018 07:01  -  2018 07:00  --------------------------------------------------------  IN: 840 mL / OUT: 1100 mL / NET: -260 mL    2018 07:01  -  2018 23:46  --------------------------------------------------------  IN: 650 mL / OUT: 350 mL / NET: 300 mL        Physical Exam:  Appearance: No Acute Distress  HEENT: No JVD  Cardiovascular: Normal S1 S2, No murmurs/rubs/gallops  Respiratory: Clear to auscultation bilaterally  Gastrointestinal: Soft, Non-tender; hemorrhagic bullous on abdomen  Skin: No cyanosis	  Neurologic: Non-focal  Extremities: No LE edema  Psychiatry: A & O x 3, Mood & affect appropriate    Labs:                        9.3    3.5   )-----------( 354      ( 2018 07:14 )             29.6     -02    140  |  102  |  16  ----------------------------<  75  4.1   |  26  |  1.21    Ca    9.1      2018 07:14    TPro  6.3  /  Alb  3.4  /  TBili  1.1  /  DBili  x   /  AST  18  /  ALT  23  /  AlkPhos  58  06-02        TELEMETRY:  8 beats WCT

## 2018-06-02 NOTE — PROGRESS NOTE ADULT - ASSESSMENT
69 y/o M w/h/o HTN/CHF/STV/Tony Fib>AICD/PAF/NICM>LVAD and more recently heart tx c/b DVT on Lovenox. Here with NODAT. Tolerating POs. Glycemic control mostly at goal while on present insulin regimen. Will adjust basal insulin and recommend simplified regimen upon discharge (no scales/set dose amounts).

## 2018-06-02 NOTE — PROGRESS NOTE ADULT - SUBJECTIVE AND OBJECTIVE BOX
Learner: Patient/Brother (Nawaf)  Barriers: Patient's memory/reading abilities     Patient post cardiac transplant.    Method used: Verbal discussion and written materials.    Medication safety was discussed with the patient: allergies, herbals, adherence, interactions, medication precautions, miss dose instructions, purpose, side effects, signs and symptoms to report, and storage & handling.    Patient was able to repeat and verbalize key points.    Education Summary:     Discharge immunosuppressant medications and prophylactic anti-infective agents reviewed with the patient. Outpatient medication schedule was discussed in detail including: medication name, indication, dose, administration times, treatment duration, side effects, drug interactions, and special instructions. Patient questions and concerns were answered and addressed. Patient demonstrated understanding.    The pill box was updated to incorporate all new changes. The patient and Nawaf were educated regarding insulins use (Lantus and Novolog), definition of hypoglycemia and signs and symptoms of it, how to check blood sugar, and storage of insulin products.     Also, the new topical product was initiated - mupirocin. The patient and Nawaf know that it needs to be used twice a day (stomach/wound). Enoxaparin (Lovenox) dose was changed; the patient and Nawaf are aware and will make sure that 80 mG syringes will be  and will not be used. The patient understands that injection area should be rotated to minimize hematoma and infection. Both are familiar with the injections and how to administer the medication.     The patient is coming back on Wednesday for biopsy - he will bring all his medications and the second pill box to be updated.     Time spent discharge education: 120 minutes    MEDICATIONS  (STANDING):  aspirin enteric coated 81 milliGRAM(s) Oral daily  atorvastatin 10 milliGRAM(s) Oral at bedtime  atovaquone Suspension 1500 milliGRAM(s) Oral daily  calcium carbonate 1250 mG + Vitamin D (OsCal 500 + D) 2 Tablet(s) Oral two times a day  diltiazem    milliGRAM(s) Oral daily  enoxaparin Injectable 70 milliGRAM(s) SubCutaneous two times a day  famotidine    Tablet 20 milliGRAM(s) Oral two times a day  insulin glargine Injectable (LANTUS) 12 Unit(s) SubCutaneous at bedtime  insulin lispro (HumaLOG) corrective regimen sliding scale   SubCutaneous at bedtime  insulin lispro (HumaLOG) corrective regimen sliding scale   SubCutaneous three times a day before meals  insulin lispro Injectable (HumaLOG) 10 Unit(s) SubCutaneous before breakfast  insulin lispro Injectable (HumaLOG) 8 Unit(s) SubCutaneous before lunch  insulin lispro Injectable (HumaLOG) 8 Unit(s) SubCutaneous before dinner  magnesium oxide 400 milliGRAM(s) Oral every 12 hours  mavyret 100mg/40mg  tablet 3 Tablet(s) 3 Tablet(s) Oral daily  multivitamin 1 Tablet(s) Oral daily  mupirocin 2% Ointment 1 Application(s) Topical two times a day  mycophenolate mofetil 500 milliGRAM(s) Oral <User Schedule>  nystatin    Suspension 623031 Unit(s) Oral every 6 hours  predniSONE   Tablet 7.5 milliGRAM(s) Oral two times a day  silver sulfADIAZINE 1% Cream 1 Application(s) Topical two times a day  sodium bicarbonate 1300 milliGRAM(s) Oral every 8 hours  sodium chloride 0.9% lock flush 3 milliLiter(s) IV Push every 8 hours  tacrolimus 7 milliGRAM(s) Oral <User Schedule>  valGANciclovir 450 milliGRAM(s) Oral every 12 hours    MEDICATIONS  (PRN):      Cardiac Transplant Medications:  Tacrolimus adjusted to trough (recent dose change was 8 mG PO twice a day, 9 AM and 9 PM while at home; the pill box incorporated new changes)   Mycophenolate mofetil 500 mg PO twice a day (the patient and Nawaf are aware of the recent dose change and why we did it)  Prednisone taper (current dose is 7.5 mG PO twice a day)   Atovaquone 1,500 mG (10 mL) PO daily   Nystatin 500,000 units PO swish and swallow four times a day  Valganciclovir 450 mG 1 tablet PO twice a day   Docusate 300 mG PO at bedtime   Famotidine 20 mG PO daily                           9.3    3.5   )-----------( 354      ( 02 Jun 2018 07:14 )             29.6     06-02    140  |  102  |  16  ----------------------------<  75  4.1   |  26  |  1.21    Ca    9.1      02 Jun 2018 07:14    TPro  6.3  /  Alb  3.4  /  TBili  1.1  /  DBili  x   /  AST  18  /  ALT  23  /  AlkPhos  58  06-02    LIVER FUNCTIONS - ( 02 Jun 2018 07:14 )  Alb: 3.4 g/dL / Pro: 6.3 g/dL / ALK PHOS: 58 U/L / ALT: 23 U/L / AST: 18 U/L / GGT: x           Tacrolimus (), Serum: 7.8 ng/mL (06-02-18 @ 07:47)  Tacrolimus (), Serum: 8.7 ng/mL (06-01-18 @ 07:58)  Tacrolimus (), Serum: 11.0 ng/mL (05-31-18 @ 07:49)

## 2018-06-02 NOTE — PROGRESS NOTE ADULT - SUBJECTIVE AND OBJECTIVE BOX
Diabetes Follow up note:  Interval Hx:  67 y/o M w/h/o HTN/CHF/STV/Tony Fib>AICD/PAF/NICM>LVAD and more recently heart tx c/b DVT on Lovenox. Here with NODAT requiring insulin. Tolerating POs. BG mostly at goal without hypoglycemia. BG values tightly controlled in 70's today. Plan for patient to go home today per team if all services are arranged. Pt seen at bedside w/brother present. Reports good appetite.     Review of Systems:  General: denies pain  GI: Tolerating POs without any N/V/D/ABD PAIN.  CV: No CP/SOB  ENDO: No S&Sx of hypoglycemia  MEDS:  atorvastatin 10 milliGRAM(s) Oral at bedtime  insulin glargine Injectable (LANTUS) 13 Unit(s) SubCutaneous at bedtime  insulin lispro (HumaLOG) corrective regimen sliding scale   SubCutaneous at bedtime  insulin lispro (HumaLOG) corrective regimen sliding scale   SubCutaneous three times a day before meals  insulin lispro Injectable (HumaLOG) 10 Unit(s) SubCutaneous before breakfast  insulin lispro Injectable (HumaLOG) 8 Unit(s) SubCutaneous before lunch  insulin lispro Injectable (HumaLOG) 8 Unit(s) SubCutaneous before dinner  predniSONE   Tablet 7.5 milliGRAM(s) Oral two times a day    atovaquone Suspension 1500 milliGRAM(s) Oral daily  nystatin    Suspension 425083 Unit(s) Oral every 6 hours  valGANciclovir 450 milliGRAM(s) Oral every 12 hours    Allergies    No Known Allergies        PE:  General: Male sitting in chair. NAD.   Vital Signs Last 24 Hrs  T(C): 37 (02 Jun 2018 11:13), Max: 37.2 (02 Jun 2018 03:31)  T(F): 98.6 (02 Jun 2018 11:13), Max: 98.9 (02 Jun 2018 03:31)  HR: 99 (02 Jun 2018 11:13) (95 - 103)  BP: 98/68 (02 Jun 2018 11:13) (98/68 - 113/78)  BP(mean): 88 (02 Jun 2018 04:30) (82 - 89)  RR: 18 (02 Jun 2018 11:13) (18 - 18)  SpO2: 100% (02 Jun 2018 11:13) (99% - 100%)  Abd: Soft, NT,ND,   Extremities: Warm. no edema  Neuro: A&O X3    LABS:    POCT Blood Glucose.: 75 mg/dL (06-02-18 @ 11:39)  POCT Blood Glucose.: 76 mg/dL (06-02-18 @ 07:07)  POCT Blood Glucose.: 131 mg/dL (06-01-18 @ 21:51)  POCT Blood Glucose.: 180 mg/dL (06-01-18 @ 16:54)  POCT Blood Glucose.: 263 mg/dL (06-01-18 @ 11:34)  POCT Blood Glucose.: 105 mg/dL (06-01-18 @ 07:18)  POCT Blood Glucose.: 97 mg/dL (05-31-18 @ 21:14)  POCT Blood Glucose.: 100 mg/dL (05-31-18 @ 16:35)  POCT Blood Glucose.: 168 mg/dL (05-31-18 @ 12:00)  POCT Blood Glucose.: 124 mg/dL (05-31-18 @ 07:11)  POCT Blood Glucose.: 99 mg/dL (05-30-18 @ 21:38)  POCT Blood Glucose.: 93 mg/dL (05-30-18 @ 16:23)                            9.3    3.5   )-----------( 354      ( 02 Jun 2018 07:14 )             29.6       06-02    140  |  102  |  16  ----------------------------<  75  4.1   |  26  |  1.21    Ca    9.1      02 Jun 2018 07:14    TPro  6.3  /  Alb  3.4  /  TBili  1.1  /  DBili  x   /  AST  18  /  ALT  23  /  AlkPhos  58  06-02      Thyroid Function Tests:  05-24 @ 05:48 TSH 0.86 FreeT4 -- T3 61 Anti TPO -- Anti Thyroglobulin Ab -- TSI --      Hemoglobin A1C, Whole Blood: 10.4 % <H> [4.0 - 5.6] (05-23-18 @ 20:06)  Hemoglobin A1C, Whole Blood: 5.3 % [4.0 - 5.6] (03-18-18 @ 11:20)            Contact number: verayakelin [phone] or [phone]

## 2018-06-03 LAB
ALBUMIN SERPL ELPH-MCNC: 3.2 G/DL — LOW (ref 3.3–5)
ALP SERPL-CCNC: 57 U/L — SIGNIFICANT CHANGE UP (ref 40–120)
ALT FLD-CCNC: 20 U/L — SIGNIFICANT CHANGE UP (ref 10–45)
ANION GAP SERPL CALC-SCNC: 12 MMOL/L — SIGNIFICANT CHANGE UP (ref 5–17)
AST SERPL-CCNC: 17 U/L — SIGNIFICANT CHANGE UP (ref 10–40)
BASOPHILS # BLD AUTO: 0 K/UL — SIGNIFICANT CHANGE UP (ref 0–0.2)
BASOPHILS NFR BLD AUTO: 0 % — SIGNIFICANT CHANGE UP (ref 0–2)
BILIRUB SERPL-MCNC: 0.9 MG/DL — SIGNIFICANT CHANGE UP (ref 0.2–1.2)
BUN SERPL-MCNC: 15 MG/DL — SIGNIFICANT CHANGE UP (ref 7–23)
CALCIUM SERPL-MCNC: 8.9 MG/DL — SIGNIFICANT CHANGE UP (ref 8.4–10.5)
CHLORIDE SERPL-SCNC: 101 MMOL/L — SIGNIFICANT CHANGE UP (ref 96–108)
CO2 SERPL-SCNC: 25 MMOL/L — SIGNIFICANT CHANGE UP (ref 22–31)
CREAT SERPL-MCNC: 1.16 MG/DL — SIGNIFICANT CHANGE UP (ref 0.5–1.3)
EOSINOPHIL # BLD AUTO: 0 K/UL — SIGNIFICANT CHANGE UP (ref 0–0.5)
EOSINOPHIL NFR BLD AUTO: 0 % — SIGNIFICANT CHANGE UP (ref 0–6)
GLUCOSE BLDC GLUCOMTR-MCNC: 129 MG/DL — HIGH (ref 70–99)
GLUCOSE BLDC GLUCOMTR-MCNC: 140 MG/DL — HIGH (ref 70–99)
GLUCOSE BLDC GLUCOMTR-MCNC: 82 MG/DL — SIGNIFICANT CHANGE UP (ref 70–99)
GLUCOSE BLDC GLUCOMTR-MCNC: 99 MG/DL — SIGNIFICANT CHANGE UP (ref 70–99)
GLUCOSE SERPL-MCNC: 92 MG/DL — SIGNIFICANT CHANGE UP (ref 70–99)
HCT VFR BLD CALC: 27 % — LOW (ref 39–50)
HGB BLD-MCNC: 8.8 G/DL — LOW (ref 13–17)
LMWH PPP CHRO-ACNC: 1.22 IU/ML — HIGH (ref 0.5–1.1)
LYMPHOCYTES # BLD AUTO: 0.5 K/UL — LOW (ref 1–3.3)
LYMPHOCYTES # BLD AUTO: 15 % — SIGNIFICANT CHANGE UP (ref 13–44)
MAGNESIUM SERPL-MCNC: 2.4 MG/DL — SIGNIFICANT CHANGE UP (ref 1.6–2.6)
MCHC RBC-ENTMCNC: 31.8 PG — SIGNIFICANT CHANGE UP (ref 27–34)
MCHC RBC-ENTMCNC: 32.7 GM/DL — SIGNIFICANT CHANGE UP (ref 32–36)
MCV RBC AUTO: 97.1 FL — SIGNIFICANT CHANGE UP (ref 80–100)
MONOCYTES # BLD AUTO: 0.1 K/UL — SIGNIFICANT CHANGE UP (ref 0–0.9)
MONOCYTES NFR BLD AUTO: 10 % — SIGNIFICANT CHANGE UP (ref 2–14)
NEUTROPHILS # BLD AUTO: 2.2 K/UL — SIGNIFICANT CHANGE UP (ref 1.8–7.4)
NEUTROPHILS NFR BLD AUTO: 70 % — SIGNIFICANT CHANGE UP (ref 43–77)
NEUTS BAND # BLD: 5 % — SIGNIFICANT CHANGE UP (ref 0–8)
PLAT MORPH BLD: NORMAL — SIGNIFICANT CHANGE UP
PLATELET # BLD AUTO: 402 K/UL — HIGH (ref 150–400)
POTASSIUM SERPL-MCNC: 4 MMOL/L — SIGNIFICANT CHANGE UP (ref 3.5–5.3)
POTASSIUM SERPL-SCNC: 4 MMOL/L — SIGNIFICANT CHANGE UP (ref 3.5–5.3)
PROT SERPL-MCNC: 6.2 G/DL — SIGNIFICANT CHANGE UP (ref 6–8.3)
RBC # BLD: 2.78 M/UL — LOW (ref 4.2–5.8)
RBC # FLD: 19.9 % — HIGH (ref 10.3–14.5)
RBC BLD AUTO: SIGNIFICANT CHANGE UP
SODIUM SERPL-SCNC: 138 MMOL/L — SIGNIFICANT CHANGE UP (ref 135–145)
TACROLIMUS SERPL-MCNC: 11.1 NG/ML — SIGNIFICANT CHANGE UP
WBC # BLD: 2.8 K/UL — LOW (ref 3.8–10.5)
WBC # FLD AUTO: 2.8 K/UL — LOW (ref 3.8–10.5)

## 2018-06-03 PROCEDURE — 93306 TTE W/DOPPLER COMPLETE: CPT | Mod: 26

## 2018-06-03 PROCEDURE — 74176 CT ABD & PELVIS W/O CONTRAST: CPT | Mod: 26

## 2018-06-03 PROCEDURE — 99233 SBSQ HOSP IP/OBS HIGH 50: CPT

## 2018-06-03 PROCEDURE — 99232 SBSQ HOSP IP/OBS MODERATE 35: CPT

## 2018-06-03 RX ORDER — TACROLIMUS 5 MG/1
8 CAPSULE ORAL
Qty: 481 | Refills: 1 | OUTPATIENT
Start: 2018-06-03 | End: 2018-08-01

## 2018-06-03 RX ORDER — INSULIN LISPRO 100/ML
9 VIAL (ML) SUBCUTANEOUS
Qty: 0 | Refills: 0 | Status: DISCONTINUED | OUTPATIENT
Start: 2018-06-04 | End: 2018-06-04

## 2018-06-03 RX ORDER — TACROLIMUS 5 MG/1
7.5 CAPSULE ORAL
Qty: 0 | Refills: 0 | Status: DISCONTINUED | OUTPATIENT
Start: 2018-06-03 | End: 2018-06-05

## 2018-06-03 RX ORDER — INSULIN LISPRO 100/ML
6 VIAL (ML) SUBCUTANEOUS
Qty: 0 | Refills: 0 | Status: DISCONTINUED | OUTPATIENT
Start: 2018-06-03 | End: 2018-06-04

## 2018-06-03 RX ORDER — POTASSIUM CHLORIDE 20 MEQ
20 PACKET (EA) ORAL ONCE
Qty: 0 | Refills: 0 | Status: COMPLETED | OUTPATIENT
Start: 2018-06-03 | End: 2018-06-03

## 2018-06-03 RX ORDER — TACROLIMUS 5 MG/1
7.5 CAPSULE ORAL
Qty: 0 | Refills: 0 | Status: DISCONTINUED | OUTPATIENT
Start: 2018-06-03 | End: 2018-06-03

## 2018-06-03 RX ORDER — INSULIN GLARGINE 100 [IU]/ML
11 INJECTION, SOLUTION SUBCUTANEOUS AT BEDTIME
Qty: 0 | Refills: 0 | Status: DISCONTINUED | OUTPATIENT
Start: 2018-06-03 | End: 2018-06-04

## 2018-06-03 RX ADMIN — FAMOTIDINE 20 MILLIGRAM(S): 10 INJECTION INTRAVENOUS at 18:07

## 2018-06-03 RX ADMIN — Medication 1 APPLICATION(S): at 18:05

## 2018-06-03 RX ADMIN — MAGNESIUM OXIDE 400 MG ORAL TABLET 400 MILLIGRAM(S): 241.3 TABLET ORAL at 18:06

## 2018-06-03 RX ADMIN — VALGANCICLOVIR 450 MILLIGRAM(S): 450 TABLET, FILM COATED ORAL at 18:06

## 2018-06-03 RX ADMIN — SODIUM CHLORIDE 3 MILLILITER(S): 9 INJECTION INTRAMUSCULAR; INTRAVENOUS; SUBCUTANEOUS at 06:38

## 2018-06-03 RX ADMIN — TACROLIMUS 8 MILLIGRAM(S): 5 CAPSULE ORAL at 08:00

## 2018-06-03 RX ADMIN — INSULIN GLARGINE 11 UNIT(S): 100 INJECTION, SOLUTION SUBCUTANEOUS at 22:47

## 2018-06-03 RX ADMIN — SODIUM CHLORIDE 3 MILLILITER(S): 9 INJECTION INTRAMUSCULAR; INTRAVENOUS; SUBCUTANEOUS at 14:07

## 2018-06-03 RX ADMIN — MUPIROCIN 1 APPLICATION(S): 20 OINTMENT TOPICAL at 18:05

## 2018-06-03 RX ADMIN — MUPIROCIN 1 APPLICATION(S): 20 OINTMENT TOPICAL at 06:33

## 2018-06-03 RX ADMIN — Medication 2 TABLET(S): at 18:06

## 2018-06-03 RX ADMIN — Medication 500000 UNIT(S): at 12:07

## 2018-06-03 RX ADMIN — Medication 500000 UNIT(S): at 06:33

## 2018-06-03 RX ADMIN — Medication 500000 UNIT(S): at 18:06

## 2018-06-03 RX ADMIN — Medication 1 TABLET(S): at 12:08

## 2018-06-03 RX ADMIN — VALGANCICLOVIR 450 MILLIGRAM(S): 450 TABLET, FILM COATED ORAL at 06:34

## 2018-06-03 RX ADMIN — Medication 10 UNIT(S): at 08:03

## 2018-06-03 RX ADMIN — ENOXAPARIN SODIUM 70 MILLIGRAM(S): 100 INJECTION SUBCUTANEOUS at 06:33

## 2018-06-03 RX ADMIN — FAMOTIDINE 20 MILLIGRAM(S): 10 INJECTION INTRAVENOUS at 06:32

## 2018-06-03 RX ADMIN — Medication 7.5 MILLIGRAM(S): at 06:32

## 2018-06-03 RX ADMIN — Medication 1300 MILLIGRAM(S): at 14:04

## 2018-06-03 RX ADMIN — SODIUM CHLORIDE 3 MILLILITER(S): 9 INJECTION INTRAMUSCULAR; INTRAVENOUS; SUBCUTANEOUS at 22:44

## 2018-06-03 RX ADMIN — Medication 81 MILLIGRAM(S): at 12:08

## 2018-06-03 RX ADMIN — Medication 6 UNIT(S): at 12:43

## 2018-06-03 RX ADMIN — TACROLIMUS 7.5 MILLIGRAM(S): 5 CAPSULE ORAL at 20:09

## 2018-06-03 RX ADMIN — Medication 7.5 MILLIGRAM(S): at 18:06

## 2018-06-03 RX ADMIN — Medication 20 MILLIEQUIVALENT(S): at 14:04

## 2018-06-03 RX ADMIN — Medication 1300 MILLIGRAM(S): at 06:32

## 2018-06-03 RX ADMIN — Medication 2 TABLET(S): at 06:32

## 2018-06-03 RX ADMIN — Medication 500000 UNIT(S): at 23:03

## 2018-06-03 RX ADMIN — MYCOPHENOLATE MOFETIL 500 MILLIGRAM(S): 250 CAPSULE ORAL at 20:09

## 2018-06-03 RX ADMIN — ENOXAPARIN SODIUM 70 MILLIGRAM(S): 100 INJECTION SUBCUTANEOUS at 18:04

## 2018-06-03 RX ADMIN — MAGNESIUM OXIDE 400 MG ORAL TABLET 400 MILLIGRAM(S): 241.3 TABLET ORAL at 06:32

## 2018-06-03 RX ADMIN — MYCOPHENOLATE MOFETIL 500 MILLIGRAM(S): 250 CAPSULE ORAL at 08:01

## 2018-06-03 RX ADMIN — ATOVAQUONE 1500 MILLIGRAM(S): 750 SUSPENSION ORAL at 12:44

## 2018-06-03 RX ADMIN — Medication 180 MILLIGRAM(S): at 06:32

## 2018-06-03 RX ADMIN — Medication 1 APPLICATION(S): at 06:33

## 2018-06-03 RX ADMIN — Medication 1300 MILLIGRAM(S): at 22:46

## 2018-06-03 RX ADMIN — ATORVASTATIN CALCIUM 10 MILLIGRAM(S): 80 TABLET, FILM COATED ORAL at 20:13

## 2018-06-03 NOTE — PROVIDER CONTACT NOTE (OTHER) - RECOMMENDATIONS
Patient instructed to have family member bring him the meds from home, however patient stated that noone is available.

## 2018-06-03 NOTE — PROVIDER CONTACT NOTE (OTHER) - ACTION/TREATMENT ORDERED:
No new orders at this time--NP will review rhythm strip and orders. Nurse will continue to monitor pt.
Pharmacy called to see if med was available in pharmacy,

## 2018-06-03 NOTE — PROGRESS NOTE ADULT - SUBJECTIVE AND OBJECTIVE BOX
Diabetes Follow up note:  Interval Hx:  69 y/o M w/h/o HTN/CHF/STV/Tony Fib>AICD/PAF/NICM>LVAD and more recently heart tx c/b DVT on Lovenox. Here with NODAT requiring insulin. Tolerating POs. BG values 70's during day yesterday and then with hypoglycemic event at bedtime. Discharge delayed to Monday for dispo issues. Pt seen at bedside. Attributes hypoglycemia to "I'm not eating enough". Fasting glucose 99 this AM. Denies hypo symptoms today and had "better breakfast"    Review of Systems:  General: "I don't want my sugar to go low when I go home"  GI: Tolerating POs without any N/V/D/ABD PAIN.  CV: No CP/SOB  Resp: + cough  ENDO: No S&Sx of hypoglycemia  MEDS:  atorvastatin 10 milliGRAM(s) Oral at bedtime  insulin glargine Injectable (LANTUS) 12 Unit(s) SubCutaneous at bedtime  insulin lispro (HumaLOG) corrective regimen sliding scale   SubCutaneous at bedtime  insulin lispro (HumaLOG) corrective regimen sliding scale   SubCutaneous three times a day before meals  insulin lispro Injectable (HumaLOG) 10 Unit(s) SubCutaneous before breakfast  insulin lispro Injectable (HumaLOG) 8 Unit(s) SubCutaneous before lunch  insulin lispro Injectable (HumaLOG) 8 Unit(s) SubCutaneous before dinner  predniSONE   Tablet 7.5 milliGRAM(s) Oral two times a day    atovaquone Suspension 1500 milliGRAM(s) Oral daily  nystatin    Suspension 421387 Unit(s) Oral every 6 hours  valGANciclovir 450 milliGRAM(s) Oral every 12 hours    Allergies    No Known Allergies        PE:  General: Male lying in bed. NAD.   Vital Signs Last 24 Hrs  T(C): 37.1 (03 Jun 2018 05:48), Max: 37.1 (02 Jun 2018 19:40)  T(F): 98.8 (03 Jun 2018 05:48), Max: 98.8 (02 Jun 2018 19:40)  HR: 98 (03 Jun 2018 05:48) (97 - 108)  BP: 110/78 (03 Jun 2018 05:48) (98/68 - 116/79)  BP(mean): --  RR: 17 (03 Jun 2018 05:48) (17 - 18)  SpO2: 99% (03 Jun 2018 05:48) (98% - 100%)  Chest: Midsternal incision healed  Abd: Soft, NT,ND,   Extremities: Warm  Neuro: A&O X3    LABS:    POCT Blood Glucose.: 99 mg/dL (06-03-18 @ 07:31)  POCT Blood Glucose.: 156 mg/dL (06-02-18 @ 22:52)  POCT Blood Glucose.: 65 mg/dL (06-02-18 @ 22:00)  POCT Blood Glucose.: 66 mg/dL (06-02-18 @ 21:56)  POCT Blood Glucose.: 73 mg/dL (06-02-18 @ 16:22)  POCT Blood Glucose.: 75 mg/dL (06-02-18 @ 11:39)  POCT Blood Glucose.: 76 mg/dL (06-02-18 @ 07:07)  POCT Blood Glucose.: 131 mg/dL (06-01-18 @ 21:51)  POCT Blood Glucose.: 180 mg/dL (06-01-18 @ 16:54)  POCT Blood Glucose.: 263 mg/dL (06-01-18 @ 11:34)  POCT Blood Glucose.: 105 mg/dL (06-01-18 @ 07:18)  POCT Blood Glucose.: 97 mg/dL (05-31-18 @ 21:14)  POCT Blood Glucose.: 100 mg/dL (05-31-18 @ 16:35)  POCT Blood Glucose.: 168 mg/dL (05-31-18 @ 12:00)                            8.8    2.8   )-----------( 402      ( 03 Jun 2018 07:39 )             27.0       06-03    138  |  101  |  15  ----------------------------<  92  4.0   |  25  |  1.16    Ca    8.9      03 Jun 2018 07:39    TPro  6.2  /  Alb  3.2<L>  /  TBili  0.9  /  DBili  x   /  AST  17  /  ALT  20  /  AlkPhos  57  06-03      Thyroid Function Tests:  05-24 @ 05:48 TSH 0.86 FreeT4 -- T3 61 Anti TPO -- Anti Thyroglobulin Ab -- TSI --      Hemoglobin A1C, Whole Blood: 10.4 % <H> [4.0 - 5.6] (05-23-18 @ 20:06)  Hemoglobin A1C, Whole Blood: 5.3 % [4.0 - 5.6] (03-18-18 @ 11:20)            Contact number: deanne [phone] or [phone]

## 2018-06-03 NOTE — PROGRESS NOTE ADULT - ASSESSMENT
67 y/o M w/h/o HTN/CHF/STV/Tony Fib>AICD/PAF/NICM>LVAD and more recently heart tx c/b DVT on Lovenox. Here with NODAT. Tolerating POs. BG values running below goal w/hypoglycemic incident. Suspect that patient's insulin requirements may be decreasing as his body has adjusted to having well controlled glucose values over the past two weeks. Will slightly titrate down basal/bolus regimen. BG goal (100-180mg/dl).

## 2018-06-03 NOTE — PROGRESS NOTE ADULT - SUBJECTIVE AND OBJECTIVE BOX
Interval History:  doing well  unable to go home as home attendant not arranged   plan to go home tomorrow  missed dose of mavyret since family brought it home yesterday  had 10 beats wCT on tele, asymptomatic    Medications:  aspirin enteric coated 81 milliGRAM(s) Oral daily  atorvastatin 10 milliGRAM(s) Oral at bedtime  atovaquone Suspension 1500 milliGRAM(s) Oral daily  calcium carbonate 1250 mG + Vitamin D (OsCal 500 + D) 2 Tablet(s) Oral two times a day  diltiazem    milliGRAM(s) Oral daily  enoxaparin Injectable 70 milliGRAM(s) SubCutaneous two times a day  famotidine    Tablet 20 milliGRAM(s) Oral two times a day  insulin glargine Injectable (LANTUS) 11 Unit(s) SubCutaneous at bedtime  insulin lispro (HumaLOG) corrective regimen sliding scale   SubCutaneous at bedtime  insulin lispro (HumaLOG) corrective regimen sliding scale   SubCutaneous three times a day before meals  insulin lispro Injectable (HumaLOG) 6 Unit(s) SubCutaneous before lunch  insulin lispro Injectable (HumaLOG) 6 Unit(s) SubCutaneous before dinner  magnesium oxide 400 milliGRAM(s) Oral every 12 hours  mavyret 100mg/40mg  tablet 3 Tablet(s) 3 Tablet(s) Oral daily  multivitamin 1 Tablet(s) Oral daily  mupirocin 2% Ointment 1 Application(s) Topical two times a day  mycophenolate mofetil 500 milliGRAM(s) Oral <User Schedule>  nystatin    Suspension 449337 Unit(s) Oral every 6 hours  predniSONE   Tablet 7.5 milliGRAM(s) Oral two times a day  silver sulfADIAZINE 1% Cream 1 Application(s) Topical two times a day  sodium bicarbonate 1300 milliGRAM(s) Oral every 8 hours  sodium chloride 0.9% lock flush 3 milliLiter(s) IV Push every 8 hours  tacrolimus 7.5 milliGRAM(s) Oral <User Schedule>  valGANciclovir 450 milliGRAM(s) Oral every 12 hours      Vitals:  T(C): 36.9 (18 @ 22:08), Max: 37.1 (18 @ 05:48)  HR: 99 (18 @ 22:08) (98 - 103)  BP: 103/74 (18 @ 22:08) (103/74 - 117/77)  RR: 18 (18 @ 22:08) (17 - 18)  SpO2: 97% (18 @ 22:08) (94% - 99%)    Daily     Daily Weight in k.6 (2018 08:20)    I&O's Summary    2018 07:  -  2018 07:00  --------------------------------------------------------  IN: 700 mL / OUT: 650 mL / NET: 50 mL    2018 07:  -  2018 23:56  --------------------------------------------------------  IN: 840 mL / OUT: 800 mL / NET: 40 mL    Physical Exam:  Appearance: No Acute Distress  HEENT: No JVD  Cardiovascular: Normal S1 S2, No murmurs/rubs/gallops  Respiratory: Clear to auscultation bilaterally  Gastrointestinal: tense, protuberant, subcutaneous hematomas; blood blister above umbilical   Skin: No cyanosis	  Neurologic: Non-focal  Extremities: No LE edema  Psychiatry: A & O x 3, Mood & affect appropriate    Labs:                        8.8    2.8   )-----------( 402      ( 2018 07:39 )             27.0     -    138  |  101  |  15  ----------------------------<  92  4.0   |  25  |  1.16    Ca    8.9      2018 07:39  Mg     2.4     -    TPro  6.2  /  Alb  3.2<L>  /  TBili  0.9  /  DBili  x   /  AST  17  /  ALT  20  /  AlkPhos  57  06-        TELEMETRY:  8 beats WCT    Echocardiogram:

## 2018-06-03 NOTE — PROGRESS NOTE ADULT - SUBJECTIVE AND OBJECTIVE BOX
VITAL SIGNS    Telemetry:  sr   pvc's    Vital Signs Last 24 Hrs  T(C): 37.1 (18 @ 05:48), Max: 37.1 (18 @ 19:40)  T(F): 98.8 (18 @ 05:48), Max: 98.8 (18 @ 19:40)  HR: 98 (18 @ 05:48) (97 - 108)  BP: 110/78 (18 @ 05:48) (110/78 - 116/79)  RR: 17 (18 @ 05:48) (17 - 18)  SpO2: 99% (18 @ 05:48) (98% - 100%)            Daily Weight in k.6 (2018 08:20)      Bilirubin Total, Serum: 0.9 mg/dL ( @ 07:39)    CAPILLARY BLOOD GLUCOSE      POCT Blood Glucose.: 140 mg/dL (2018 12:04)  POCT Blood Glucose.: 99 mg/dL (2018 07:31)  POCT Blood Glucose.: 156 mg/dL (2018 22:52)  POCT Blood Glucose.: 65 mg/dL (2018 22:00)  POCT Blood Glucose.: 66 mg/dL (2018 21:56)  POCT Blood Glucose.: 73 mg/dL (2018 16:22)                         PHYSICAL EXAM    Neurology: alert and oriented x 3, moves all extremities with no defecits  CV :  RRR  Sternal Wound :  CDI , Stable  Lungs:   CTA B/L  Abdomen: soft, nontender, nondistended, positive bowel sounds, last bowel movement   6/2   abd lesion stable  Extremities:     no edema    ,  rt pinky finger with dressing

## 2018-06-03 NOTE — PROGRESS NOTE ADULT - ASSESSMENT
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 Now presenting with hyperglycemia, glucose initally >700,  likely 2/2 to steroids vs. prograf.   Hospital course:  5/24: Weaned off Insulin gtt transitioned to basal bolus insulin. Glucometer/Insulin teaching initiated  5/25: Overnight: HR with low noted to be 75-80 with sleep; Hypotensive with SPB 91; Decrease Cardizem to 180 mg daily; Prograft level 17.5; Mild neutropenia WBC 3.4 Decrease valganciclovir 450 mg po daily and Cellcept to 750 mg PO BID. Maintain Tacrolimus 6 mg PO BID; Continue with Diabetic teaching. Endo following for hyperglycemia; Lantus increased to 13 units HA and pre meal increased to 8 units TID.  5/26 VVS; Continue with current medication regimen.  Monitor leukopenia WBC 3.1.  Awaiting HCV viral load. Hypotension SBP 90's, Norvasc and HCTZ D/C'd. Urine culture with Klebsiella oxytoca; patient asymptomatic per ID no abx warranted at this time--> continue to monitor.  Progaf level 15; continue with current dose. Supplement potassium 40 meq PO x 1 for K+ 3.8.    5/27: HCV viral load/CMV PCR pending.  s/p Fall from chair. Bed Alarm in place  5/28 VSS - no complaints - Prograf level = 11.1  continue Tacro@6 BID  5/29: HCV viral load/CMV PCR pending.  Peripheral IV replaced.   Disposition: Home once medically cleared    5/30 cmv, hep c neg.  Pt w small hematoma on the surface of his abd. derm consulted, cultures sent  Insulin teaching in progress.  5/31/18-D/c planning ? home friday 6/2     VSS   prograf dose increased to 8 mg  bid,  discharge cx  secobdary to pt " has no ride"  and home care asst   not set up,  6/3   wct   obtaining echo,   ssmall drop in Hct   obtaining abd ct to assess  for bleed, bs  60  lantus decreased

## 2018-06-03 NOTE — PROGRESS NOTE ADULT - PROBLEM SELECTOR PLAN 5
HCV donor: Continue with Mavyret 100mg/40mg 3 Tablet(s) Oral daily  PCP/Toxoplasmosis prophylaxis: mepron 1500 po daily   CMV prophylaxis:valcyte 450 mg PO daily

## 2018-06-03 NOTE — PROGRESS NOTE ADULT - PROBLEM SELECTOR PLAN 6
D/C planning to home with home care and supervision at home with insulin therapy and glucose monitoring.    Endocrine clinic follow up to be scheduled upon discharge

## 2018-06-03 NOTE — PROGRESS NOTE ADULT - PROBLEM SELECTOR PLAN 2
CK FK level Daily  Prograf 8mg bid ,      abd ct to r/o bleed  Continue with Cellcept to 750 mg PO bid.  Continue with prednisone  BID

## 2018-06-03 NOTE — PROGRESS NOTE ADULT - PROBLEM SELECTOR PLAN 1
Inpatient:  -test BG AC/HS  -Decrease Lantus 11 units QHS  -Adjust Humalog 9-6-6 w/meals. (please hold if patient not eating-if BG in 70's, can give 50% of dose)  -c/w Humalog low correction scale AC and Low HS scale  Discharge plan:  -can discharge on Lantus 11 and Novolog 6 units TID w/meals  -Will need home care to reinforce education. Brother might be able to assist pt at home.  -Has f/u apt at endo clinic on 6/21/18 at 10:40am . [address].  Pt has all relevant glucometer and insulin supplies at bedside  -Plan discussed with pt/team.  pager: [phone]

## 2018-06-03 NOTE — PROVIDER CONTACT NOTE (OTHER) - SITUATION
RN was told by patient that his meds was sent home because he was supposed to be D/C home yesterday and noone can bring his meds for him today. OPAL Agudelo made aware

## 2018-06-04 LAB
ALBUMIN SERPL ELPH-MCNC: 3.2 G/DL — LOW (ref 3.3–5)
ALP SERPL-CCNC: 57 U/L — SIGNIFICANT CHANGE UP (ref 40–120)
ALT FLD-CCNC: 18 U/L — SIGNIFICANT CHANGE UP (ref 10–45)
ANION GAP SERPL CALC-SCNC: 12 MMOL/L — SIGNIFICANT CHANGE UP (ref 5–17)
AST SERPL-CCNC: 19 U/L — SIGNIFICANT CHANGE UP (ref 10–40)
BILIRUB SERPL-MCNC: 1 MG/DL — SIGNIFICANT CHANGE UP (ref 0.2–1.2)
BUN SERPL-MCNC: 18 MG/DL — SIGNIFICANT CHANGE UP (ref 7–23)
CALCIUM SERPL-MCNC: 8.6 MG/DL — SIGNIFICANT CHANGE UP (ref 8.4–10.5)
CHLORIDE SERPL-SCNC: 101 MMOL/L — SIGNIFICANT CHANGE UP (ref 96–108)
CO2 SERPL-SCNC: 25 MMOL/L — SIGNIFICANT CHANGE UP (ref 22–31)
CREAT SERPL-MCNC: 1.23 MG/DL — SIGNIFICANT CHANGE UP (ref 0.5–1.3)
GLUCOSE BLDC GLUCOMTR-MCNC: 105 MG/DL — HIGH (ref 70–99)
GLUCOSE BLDC GLUCOMTR-MCNC: 123 MG/DL — HIGH (ref 70–99)
GLUCOSE BLDC GLUCOMTR-MCNC: 170 MG/DL — HIGH (ref 70–99)
GLUCOSE BLDC GLUCOMTR-MCNC: 74 MG/DL — SIGNIFICANT CHANGE UP (ref 70–99)
GLUCOSE BLDC GLUCOMTR-MCNC: 92 MG/DL — SIGNIFICANT CHANGE UP (ref 70–99)
GLUCOSE SERPL-MCNC: 100 MG/DL — HIGH (ref 70–99)
HCT VFR BLD CALC: 27.5 % — LOW (ref 39–50)
HGB BLD-MCNC: 8.6 G/DL — LOW (ref 13–17)
MCHC RBC-ENTMCNC: 29.9 PG — SIGNIFICANT CHANGE UP (ref 27–34)
MCHC RBC-ENTMCNC: 31 GM/DL — LOW (ref 32–36)
MCV RBC AUTO: 96.5 FL — SIGNIFICANT CHANGE UP (ref 80–100)
PLATELET # BLD AUTO: 422 K/UL — HIGH (ref 150–400)
POTASSIUM SERPL-MCNC: 4.2 MMOL/L — SIGNIFICANT CHANGE UP (ref 3.5–5.3)
POTASSIUM SERPL-SCNC: 4.2 MMOL/L — SIGNIFICANT CHANGE UP (ref 3.5–5.3)
PROT SERPL-MCNC: 6.3 G/DL — SIGNIFICANT CHANGE UP (ref 6–8.3)
RBC # BLD: 2.85 M/UL — LOW (ref 4.2–5.8)
RBC # FLD: 19.6 % — HIGH (ref 10.3–14.5)
SODIUM SERPL-SCNC: 138 MMOL/L — SIGNIFICANT CHANGE UP (ref 135–145)
TACROLIMUS SERPL-MCNC: 9.1 NG/ML — SIGNIFICANT CHANGE UP
WBC # BLD: 3 K/UL — LOW (ref 3.8–10.5)
WBC # FLD AUTO: 3 K/UL — LOW (ref 3.8–10.5)

## 2018-06-04 PROCEDURE — 99232 SBSQ HOSP IP/OBS MODERATE 35: CPT

## 2018-06-04 PROCEDURE — 99233 SBSQ HOSP IP/OBS HIGH 50: CPT

## 2018-06-04 PROCEDURE — 71250 CT THORAX DX C-: CPT | Mod: 26

## 2018-06-04 PROCEDURE — 93970 EXTREMITY STUDY: CPT | Mod: 26

## 2018-06-04 RX ORDER — INSULIN LISPRO 100/ML
3 VIAL (ML) SUBCUTANEOUS
Qty: 0 | Refills: 0 | Status: DISCONTINUED | OUTPATIENT
Start: 2018-06-04 | End: 2018-06-05

## 2018-06-04 RX ORDER — INSULIN LISPRO 100/ML
7 VIAL (ML) SUBCUTANEOUS
Qty: 0 | Refills: 0 | Status: DISCONTINUED | OUTPATIENT
Start: 2018-06-04 | End: 2018-06-05

## 2018-06-04 RX ORDER — INSULIN GLARGINE 100 [IU]/ML
9 INJECTION, SOLUTION SUBCUTANEOUS AT BEDTIME
Qty: 0 | Refills: 0 | Status: DISCONTINUED | OUTPATIENT
Start: 2018-06-04 | End: 2018-06-05

## 2018-06-04 RX ORDER — INSULIN LISPRO 100/ML
4 VIAL (ML) SUBCUTANEOUS
Qty: 0 | Refills: 0 | Status: DISCONTINUED | OUTPATIENT
Start: 2018-06-04 | End: 2018-06-05

## 2018-06-04 RX ADMIN — Medication 500000 UNIT(S): at 06:13

## 2018-06-04 RX ADMIN — Medication 1 APPLICATION(S): at 17:41

## 2018-06-04 RX ADMIN — Medication 1300 MILLIGRAM(S): at 06:38

## 2018-06-04 RX ADMIN — Medication 1300 MILLIGRAM(S): at 13:01

## 2018-06-04 RX ADMIN — Medication 9 UNIT(S): at 08:39

## 2018-06-04 RX ADMIN — Medication 1 APPLICATION(S): at 06:13

## 2018-06-04 RX ADMIN — MYCOPHENOLATE MOFETIL 500 MILLIGRAM(S): 250 CAPSULE ORAL at 20:01

## 2018-06-04 RX ADMIN — Medication 7.5 MILLIGRAM(S): at 06:12

## 2018-06-04 RX ADMIN — MUPIROCIN 1 APPLICATION(S): 20 OINTMENT TOPICAL at 06:11

## 2018-06-04 RX ADMIN — Medication 500000 UNIT(S): at 12:58

## 2018-06-04 RX ADMIN — MYCOPHENOLATE MOFETIL 500 MILLIGRAM(S): 250 CAPSULE ORAL at 08:00

## 2018-06-04 RX ADMIN — FAMOTIDINE 20 MILLIGRAM(S): 10 INJECTION INTRAVENOUS at 06:12

## 2018-06-04 RX ADMIN — Medication 2 TABLET(S): at 17:42

## 2018-06-04 RX ADMIN — Medication 500000 UNIT(S): at 17:42

## 2018-06-04 RX ADMIN — TACROLIMUS 7.5 MILLIGRAM(S): 5 CAPSULE ORAL at 08:08

## 2018-06-04 RX ADMIN — ENOXAPARIN SODIUM 70 MILLIGRAM(S): 100 INJECTION SUBCUTANEOUS at 06:11

## 2018-06-04 RX ADMIN — Medication 81 MILLIGRAM(S): at 12:58

## 2018-06-04 RX ADMIN — MAGNESIUM OXIDE 400 MG ORAL TABLET 400 MILLIGRAM(S): 241.3 TABLET ORAL at 17:42

## 2018-06-04 RX ADMIN — TACROLIMUS 7.5 MILLIGRAM(S): 5 CAPSULE ORAL at 20:02

## 2018-06-04 RX ADMIN — Medication 7.5 MILLIGRAM(S): at 17:41

## 2018-06-04 RX ADMIN — Medication 3 UNIT(S): at 17:31

## 2018-06-04 RX ADMIN — VALGANCICLOVIR 450 MILLIGRAM(S): 450 TABLET, FILM COATED ORAL at 17:49

## 2018-06-04 RX ADMIN — FAMOTIDINE 20 MILLIGRAM(S): 10 INJECTION INTRAVENOUS at 17:42

## 2018-06-04 RX ADMIN — SODIUM CHLORIDE 3 MILLILITER(S): 9 INJECTION INTRAMUSCULAR; INTRAVENOUS; SUBCUTANEOUS at 22:06

## 2018-06-04 RX ADMIN — Medication 2 TABLET(S): at 06:11

## 2018-06-04 RX ADMIN — Medication 1 TABLET(S): at 12:58

## 2018-06-04 RX ADMIN — VALGANCICLOVIR 450 MILLIGRAM(S): 450 TABLET, FILM COATED ORAL at 06:13

## 2018-06-04 RX ADMIN — MUPIROCIN 1 APPLICATION(S): 20 OINTMENT TOPICAL at 17:41

## 2018-06-04 RX ADMIN — INSULIN GLARGINE 9 UNIT(S): 100 INJECTION, SOLUTION SUBCUTANEOUS at 22:19

## 2018-06-04 RX ADMIN — ATOVAQUONE 1500 MILLIGRAM(S): 750 SUSPENSION ORAL at 12:58

## 2018-06-04 RX ADMIN — SODIUM CHLORIDE 3 MILLILITER(S): 9 INJECTION INTRAMUSCULAR; INTRAVENOUS; SUBCUTANEOUS at 13:00

## 2018-06-04 RX ADMIN — Medication 180 MILLIGRAM(S): at 06:12

## 2018-06-04 RX ADMIN — Medication 1300 MILLIGRAM(S): at 22:19

## 2018-06-04 RX ADMIN — Medication 20 MILLIGRAM(S): at 22:19

## 2018-06-04 RX ADMIN — MAGNESIUM OXIDE 400 MG ORAL TABLET 400 MILLIGRAM(S): 241.3 TABLET ORAL at 06:12

## 2018-06-04 RX ADMIN — SODIUM CHLORIDE 3 MILLILITER(S): 9 INJECTION INTRAMUSCULAR; INTRAVENOUS; SUBCUTANEOUS at 06:14

## 2018-06-04 NOTE — PROGRESS NOTE ADULT - SUBJECTIVE AND OBJECTIVE BOX
S:  wants to go home    Telemetry:  SR/ST     Vital Signs Last 24 Hrs  T(C): 36.7 (18 @ 10:01), Max: 37.1 (18 @ 18:05)  T(F): 98.1 (18 @ 10:01), Max: 98.8 (18 @ 18:05)  HR: 101 (18 @ 10:01) (93 - 103)  BP: 104/70 (18 @ 10:01) (103/74 - 118/82)  RR: 18 (18 @ 10:01) (17 - 18)  SpO2: 99% (18 @ 10:01) (94% - 99%)                    @ 07:01  -   @ 07:00  --------------------------------------------------------  IN: 840 mL / OUT: 1075 mL / NET: -235 mL     @ 07:01  -   @ 15:04  --------------------------------------------------------  IN: 320 mL / OUT: 0 mL / NET: 320 mL      Daily Weight in k.4 (2018 13:00)      POCT Blood Glucose.: 92 mg/dL (2018 11:37)  POCT Blood Glucose.: 170 mg/dL (2018 08:35)  POCT Blood Glucose.: 74 mg/dL (2018 07:41)  POCT Blood Glucose.: 129 mg/dL (2018 22:03)  POCT Blood Glucose.: 82 mg/dL (2018 16:16)          Drains:     MS         [  ] Drainage:                 L Pleural  [  ]  Drainage:                R Pleural  [  ]  Drainage:    Pacing Wires        [  ]   Settings:                                  Isolated  [  ]    Coumadin    [ ] YES          [ x ]      NO         Reason:     on lovenox for Hx DVT                            8.6    3.0   )-----------( 422      ( 2018 07:30 )             27.5       06-04    138  |  101  |  18  ----------------------------<  100<H>  4.2   |  25  |  1.23    Ca    8.6      2018 07:30  Mg     2.4     06-03    TPro  6.3  /  Alb  3.2<L>  /  TBili  1.0  /  DBili  x   /  AST  19  /  ALT  18  /  AlkPhos  57  06-04          PHYSICAL EXAM:    Neurology: alert and oriented x 3, nonfocal, no gross deficits    CV :  S1S2 heard    Sternal Wound :  well healed    Lungs:  clear to ausc    Abdomen:          Extremities:  no edema             aspirin enteric coated 81 milliGRAM(s) Oral daily  atovaquone Suspension 1500 milliGRAM(s) Oral daily  calcium carbonate 1250 mG + Vitamin D (OsCal 500 + D) 2 Tablet(s) Oral two times a day  diltiazem    milliGRAM(s) Oral daily  famotidine    Tablet 20 milliGRAM(s) Oral two times a day  insulin glargine Injectable (LANTUS) 9 Unit(s) SubCutaneous at bedtime  insulin lispro (HumaLOG) corrective regimen sliding scale   SubCutaneous at bedtime  insulin lispro (HumaLOG) corrective regimen sliding scale   SubCutaneous three times a day before meals  insulin lispro Injectable (HumaLOG) 4 Unit(s) SubCutaneous before lunch  insulin lispro Injectable (HumaLOG) 3 Unit(s) SubCutaneous before dinner  insulin lispro Injectable (HumaLOG) 9 Unit(s) SubCutaneous before breakfast  magnesium oxide 400 milliGRAM(s) Oral every 12 hours  mavyret 100mg/40mg  tablet 3 Tablet(s) 3 Tablet(s) Oral daily  multivitamin 1 Tablet(s) Oral daily  mupirocin 2% Ointment 1 Application(s) Topical two times a day  mycophenolate mofetil 500 milliGRAM(s) Oral <User Schedule>  nystatin    Suspension 229650 Unit(s) Oral every 6 hours  predniSONE   Tablet 7.5 milliGRAM(s) Oral two times a day  silver sulfADIAZINE 1% Cream 1 Application(s) Topical two times a day  sodium bicarbonate 1300 milliGRAM(s) Oral every 8 hours  sodium chloride 0.9% lock flush 3 milliLiter(s) IV Push every 8 hours  tacrolimus 7.5 milliGRAM(s) Oral <User Schedule>  valGANciclovir 450 milliGRAM(s) Oral every 12 hours                              Physical Therapy Rec:   Home  [ x ]   Home w/ PT  [  ]  Rehab  [  ]    Discussed with Cardiothoracic Team at AM rounds.

## 2018-06-04 NOTE — PROGRESS NOTE ADULT - ASSESSMENT
67 year old man with ACC/AHA stage D chronic systolic heart failure due to NICM s/p HM2 LVAD 6/17 c/b pump thrombosis now s/p heart transplant on 2/23 (CMV D-/R+ and Toxo D-/R+), with post-op course c/b primary graft dysfunction, initially thought to be immune-mediated due to positive B-cell flow (negative CDC cross match) and received plasmapharesis/IVIg x2 with improvement in LV function. His course has been complicated by bilateral IJ/subclavian thrombi (on lovenox). Given persistent C4D staining and decline in LV function in April he was treated with plasmapharesis/IVIg and rituxan (received 2 doses, last 5/9) for suspected AMR (noted to have non-HLA Ab to angiotension II). Has since started Mavyret for HCV treatment. Was admitted for hyperglycemia, hyponatremia and acute kidney injury on 5/23 which has improved with insulin therapy. Noted to have ecchymotic lesions on his arms and a hemorrhagic bullous lesion of the abdomen that were new. Labs also notable for continued leukopenia despite cellcept reduction.     He currently is feeling well. He appears euvolemic on exam without evidence of graft dysfunction. Blood sugars are better controlled on current insulin regimen. Tacro level subtherapeutic today.     Immunosuppression  - Tacro level 9.1; currently ordered for tacro 7.5 Q12, goal tacro level 10-12  - Continued leukopenia on cellcept 500 mg q12 PO; may need to d/c completely   - on pred 7.5 q12; continue for now    Graft function   - EF 48-50% on 5/24 (unchanged from TTE 5/9)  - repeat TTE stable (done for NSVT)  - RHC with EMB as well as LHC scheduled for 6/5; RHC/EMB for routine graft surveillance and LHC to eval for early CAV in setting of antibody mediated rejection  - Will need to hold lovenox 6/4 PM and 6/5 AM for cath    CAV PPx  - on ASA 81 mg daily  - Please change lipitor to prevastatin 40mg daily    Antimicrobial PPx  - Can discontinue nystatin as prednisone is less than 20mg/day.  - continue mepron 1500 mg daily  - on valcyte 450 mg BID    Diabetes  - new onset likely 2/2 steroids/prograf  - appreciate endo assistance; c/w home DM education  - BGs improved on current insulin regimen    Renal dysfunction   - improved  - continue monitoring for now  - on sodium bicarb for hyperkalemia which has resolved    Papular lesions  - Appears to have spontaneously occurred, does not appear to be related to trauma or Lovenox injection, unclear etiology; appreciate derm c/s  - coags wnl and factor Xa level 4 hours post next Lovenox dose were wnl   - culture results negative; HIV negative    B/L UE DVTs  - Repeat upper extremity Dopplers pending  - Follow up with vascular cardiology regarding duration of AC    Anemia - H/H dropping  - CT A/P unrevealing but did note 2.2 cm nodule in lung 67 year old man with ACC/AHA stage D chronic systolic heart failure due to NICM s/p HM2 LVAD 6/17 c/b pump thrombosis now s/p heart transplant on 2/23 (CMV D-/R+ and Toxo D-/R+), with post-op course c/b primary graft dysfunction, initially thought to be immune-mediated due to positive B-cell flow (negative CDC cross match) and received plasmapharesis/IVIg x2 with improvement in LV function. His course has been complicated by bilateral IJ/subclavian thrombi (on lovenox). Given persistent C4D staining and decline in LV function in April he was treated with plasmapharesis/IVIg and rituxan (received 2 doses, last 5/9) for suspected AMR (noted to have non-HLA Ab to angiotension II). Has since started Mavyret for HCV treatment. Was admitted for hyperglycemia, hyponatremia and acute kidney injury on 5/23 which has improved with insulin therapy. Noted to have ecchymotic lesions on his arms and a hemorrhagic bullous lesion of the abdomen that were new. Labs also notable for continued leukopenia despite cellcept reduction.     He currently is feeling well. He appears euvolemic on exam without evidence of graft dysfunction. Blood sugars are better controlled on current insulin regimen. Tacro level subtherapeutic today.     Immunosuppression  - Tacro level 9.1; continue tacro 7.5 mg Q12, goal tacro level 10-12 (resuming Mayvret tonight)  - Continue CellCept at 500 mg po Q12h given leukopenia  - on pred 7.5 q12; taper pending EMB result tomorrow    Graft function   - EF 48-50% on 5/24 (unchanged from TTE 5/9)  - repeat TTE stable (done for NSVT)  - RHC with EMB as well as LHC to look for early TCAD (given AMR) scheduled for 6/5  - Will need to hold lovenox 6/4 PM and 6/5 AM for cath    CAV PPx  - on ASA 81 mg daily  - Please change lipitor to pravastatin 20 mg at bedtime    Antimicrobial PPx  - Likely will discontinue nystatin once prednisone is tapered tomorrow.  - continue mepron 1500 mg daily  - on valcyte 450 mg BID    Diabetes  - new onset likely 2/2 steroids/prograf  - appreciate endo assistance; c/w home DM education  - BGs improved on current insulin regimen    Renal dysfunction   - improved  - continue monitoring for now  - on sodium bicarb for hyperkalemia which has resolved    Papular lesions  - Appears to have spontaneously occurred, does not appear to be related to trauma or Lovenox injection, unclear etiology; appreciate derm c/s  - coags wnl and factor Xa level 4 hours post next Lovenox dose were wnl   - culture results negative; HIV negative    B/L UE DVTs  - Repeat upper extremity Dopplers pending  - Follow up with vascular cardiology regarding duration of AC    Anemia - H/H dropping  - CT A/P unrevealing but did note 2.2 cm nodule in lung

## 2018-06-04 NOTE — PROGRESS NOTE ADULT - ASSESSMENT
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 Now presenting with hyperglycemia, glucose initally >700,  likely 2/2 to steroids vs. prograf.   Hospital course:  5/24: Weaned off Insulin gtt transitioned to basal bolus insulin. Glucometer/Insulin teaching initiated  5/25: Overnight: HR with low noted to be 75-80 with sleep; Hypotensive with SPB 91; Decrease Cardizem to 180 mg daily; Prograft level 17.5; Mild neutropenia WBC 3.4 Decrease valganciclovir 450 mg po daily and Cellcept to 750 mg PO BID. Maintain Tacrolimus 6 mg PO BID; Continue with Diabetic teaching. Endo following for hyperglycemia; Lantus increased to 13 units HA and pre meal increased to 8 units TID.  5/26 VVS; Continue with current medication regimen.  Monitor leukopenia WBC 3.1.  Awaiting HCV viral load. Hypotension SBP 90's, Norvasc and HCTZ D/C'd. Urine culture with Klebsiella oxytoca; patient asymptomatic per ID no abx warranted at this time--> continue to monitor.  Progaf level 15; continue with current dose. Supplement potassium 40 meq PO x 1 for K+ 3.8.    5/27: HCV viral load/CMV PCR pending.  s/p Fall from chair. Bed Alarm in place  5/28 VSS - no complaints - Prograf level = 11.1  continue Tacro@6 BID  5/29: HCV viral load/CMV PCR pending.  Peripheral IV replaced.   Disposition: Home once medically cleared    5/30 cmv, hep c neg.  Pt w small hematoma on the surface of his abd. derm consulted, cultures sent  Insulin teaching in progress.  5/31/18-D/c planning ? home friday 6/2     VSS   prograf dose increased to 8 mg  bid,  discharge cx  secobdary to pt " has no ride"  and home care asst   not set up,  6/3   wct   obtaining echo,   small drop in Hct   obtaining abd ct to assess  for bleed, bs  60  lantus decreased  6/4  R and L heart catheterization in am for Bx and evaluate coronary arteries.  UE duplex --negative for DVT.  CT chest ordered to eval lung nodule.  Tacro level 9.1.  Continue Prograf 7.5 BID --going to get Mayvret tonight.  Hold lovenox tonight and in am for cath.

## 2018-06-04 NOTE — PROGRESS NOTE ADULT - SUBJECTIVE AND OBJECTIVE BOX
Subjective:  - No acute events overnight  - Frustrated with still being in the hospital  - Ambulating in hallways without LOBATO. Denies SOB at rest, orthopnea, PND, CP, palpitations, lightheadedness, dizziness, abdominal pain, nausea, fevers, chills, fatigue.     Medications:  aspirin enteric coated 81 milliGRAM(s) Oral daily  atorvastatin 10 milliGRAM(s) Oral at bedtime  atovaquone Suspension 1500 milliGRAM(s) Oral daily  calcium carbonate 1250 mG + Vitamin D (OsCal 500 + D) 2 Tablet(s) Oral two times a day  diltiazem    milliGRAM(s) Oral daily  famotidine    Tablet 20 milliGRAM(s) Oral two times a day  insulin glargine Injectable (LANTUS) 9 Unit(s) SubCutaneous at bedtime  insulin lispro (HumaLOG) corrective regimen sliding scale   SubCutaneous at bedtime  insulin lispro (HumaLOG) corrective regimen sliding scale   SubCutaneous three times a day before meals  insulin lispro Injectable (HumaLOG) 4 Unit(s) SubCutaneous before lunch  insulin lispro Injectable (HumaLOG) 3 Unit(s) SubCutaneous before dinner  insulin lispro Injectable (HumaLOG) 9 Unit(s) SubCutaneous before breakfast  magnesium oxide 400 milliGRAM(s) Oral every 12 hours  mavyret 100mg/40mg  tablet 3 Tablet(s) 3 Tablet(s) Oral daily  multivitamin 1 Tablet(s) Oral daily  mupirocin 2% Ointment 1 Application(s) Topical two times a day  mycophenolate mofetil 500 milliGRAM(s) Oral <User Schedule>  nystatin    Suspension 344898 Unit(s) Oral every 6 hours  predniSONE   Tablet 7.5 milliGRAM(s) Oral two times a day  silver sulfADIAZINE 1% Cream 1 Application(s) Topical two times a day  sodium bicarbonate 1300 milliGRAM(s) Oral every 8 hours  sodium chloride 0.9% lock flush 3 milliLiter(s) IV Push every 8 hours  tacrolimus 7.5 milliGRAM(s) Oral <User Schedule>  valGANciclovir 450 milliGRAM(s) Oral every 12 hours      Physical Exam:    Vitals:  Vital Signs Last 24 Hours  T(C): 36.7 (06-04-18 @ 10:01), Max: 37.1 (06-03-18 @ 18:05)  HR: 101 (06-04-18 @ 10:01) (93 - 103)  BP: 104/70 (06-04-18 @ 10:01) (103/74 - 118/82)  RR: 18 (06-04-18 @ 10:01) (17 - 18)  SpO2: 99% (06-04-18 @ 10:01) (94% - 99%)    I&O's Summary    03 Jun 2018 07:01  -  04 Jun 2018 07:00  --------------------------------------------------------  IN: 840 mL / OUT: 1075 mL / NET: -235 mL    Tele: -  HR .    General: OOB in chair. No distress. Comfortable.  Neck: Neck supple. JVP not elevated. No masses  Chest: Clear to auscultation bilaterally  CV: Tachycardic. Normal S1 and S2. III/VI SM LLSB. Radial pulses normal. No LE edema.  Abdomen: Soft, non-distended, non-tender  Skin: Dressing over bullae on abdomen.   Neurology: Alert and oriented times three. Sensation intact  Psych: Affect normal. Frustrated mood.    Labs:                        8.6    3.0   )-----------( 422      ( 04 Jun 2018 07:30 )             27.5     06-04    138  |  101  |  18  ----------------------------<  100<H>  4.2   |  25  |  1.23    Ca    8.6      04 Jun 2018 07:30  Mg     2.4     06-03    TPro  6.3  /  Alb  3.2<L>  /  TBili  1.0  /  DBili  x   /  AST  19  /  ALT  18  /  AlkPhos  57  06-04

## 2018-06-04 NOTE — PROGRESS NOTE ADULT - PROBLEM SELECTOR PLAN 1
Inpatient:  -test BG AC/HS  -Decrease Lantus 9 units QHS  -Adjust Humalog 7-4-3 w/meals. (please hold if patient not eating)  -c/w Humalog low correction scale AC and Low HS scale  Discharge plan:  -can discharge on Lantus 9 and Novolog 5 units TID w/meals  -Will need home care to reinforce education. Per pt brother came over the weekend and learned use of insulin pen and assist pt at home.  -Has f/u apt at endo clinic on 6/21/18 at 10:40am . [address] [facility] [facility].  -Also has f/u apts on 6/11 with GIOVANI and 7/30 with Dr Antonio endocrinologist at [address] [place] [address] [address]. Phone   Pt has all relevant glucometer and insulin supplies at home  -Plan discussed with pt/team.  Contact info: [phone] (24/7). pager [phone]

## 2018-06-04 NOTE — PROGRESS NOTE ADULT - SUBJECTIVE AND OBJECTIVE BOX
Behavioral Cardiology Progress Note     HPI:  Mr. Baez is a 68 year-old man, , only son . Owns house he lives in with his brother.  Stage D chronic systolic heart failure due to NICM, s/p LVAD  c/b pump thrombus now s/p OHT  (CMV D-/R+ and Toxo D-/R+), with post- op course c/b primary graft dysfunction.  Now presenting with hyperglycemia likely 2/2 to steroids vs. prograf.     Current stressors:   Hospitalization   s/p Heart transplant (18)      Support system/family support: Good support from family and close friend      Coping strategies: Talking with family and staff, watching TV, his pastora, talking to his granddaughter     MSE:  Seen lying in bed.  A&Ox3.  Speech normal rate and volume. Thought process goal directed. No evidence of any psychosis, delusions, jona. Mood upset. Affect full range. Insight and judgment adequate.

## 2018-06-04 NOTE — PROGRESS NOTE ADULT - ASSESSMENT
69 y/o M w/h/o HTN/CHF/STV/Tony Fib>AICD/PAF/NICM>LVAD and more recently heart tx c/b DVT on Lovenox. Here with NODAT. Tolerating POs. BG values running below goal likley due to decreased PO intake and resolution of glucotoxicity. Awaiting discharge. Will decrease insulin doses to BG 100s to 180s. Spoke to pt about the need to decrease insulin doses as well as the need to eat when taking Humalog insulin. Pt verbalized understanding.

## 2018-06-04 NOTE — PROGRESS NOTE ADULT - SUBJECTIVE AND OBJECTIVE BOX
Diabetes Follow up note:  Interval Hx: 69 y/o M w/h/o HTN/CHF/STV/Tony Fib>AICD/PAF/NICM>LVAD and more recently heart tx c/b DVT on Lovenox. Here with NODAT requiring insulin. Tolerating POs. BG values remain <100s without hypoglycemia. Reports tolerating POs but not eating as much as before due to the fact that food doesn't taste good anymore.  Eager to go home.      Review of Systems:  General: "I don't know why I still here"  GI: Tolerating POs without any N/V/D/ABD PAIN.  CV: No CP/SOB  Resp: + cough  ENDO: No S&Sx of hypoglycemia      MEDS:  atorvastatin 10 milliGRAM(s) Oral at bedtime  insulin glargine Injectable (LANTUS) 11 Unit(s) SubCutaneous at bedtime  insulin lispro (HumaLOG) corrective regimen sliding scale   SubCutaneous at bedtime  insulin lispro (HumaLOG) corrective regimen sliding scale   SubCutaneous three times a day before meals  insulin lispro Injectable (HumaLOG) 9 Unit(s) SubCutaneous before breakfast  insulin lispro Injectable (HumaLOG) 6 Unit(s) SubCutaneous before lunch  insulin lispro Injectable (HumaLOG) 6 Unit(s) SubCutaneous before dinner  predniSONE   Tablet 7.5 milliGRAM(s) Oral two times a day  atovaquone Suspension 1500 milliGRAM(s) Oral daily  nystatin    Suspension 061046 Unit(s) Oral every 6 hours  valGANciclovir 450 milliGRAM(s) Oral every 12 hours  tacrolimus 7.5 milliGRAM(s) Oral <User Schedule>    Allergies    No Known Allergies        PE:  General: Male lying in bed. NAD.   Vital Signs Last 24 Hrs  T(C): 36.7 (06-04-18 @ 14:30), Max: 37.1 (06-03-18 @ 18:05)  T(F): 98 (06-04-18 @ 14:30), Max: 98.8 (06-03-18 @ 18:05)  HR: 98 (06-04-18 @ 14:30) (93 - 103)  BP: 104/71 (06-04-18 @ 14:30) (103/74 - 118/82)  BP(mean): --  RR: 18 (06-04-18 @ 14:30) (17 - 18)  SpO2: 99% (06-04-18 @ 14:30) (94% - 99%)  Chest: Midsternal incision healed  Abd: Soft, NT, ND   Extremities: Warm, no edema noted in all 4 exts  Neuro: A&O X3    LABS:  POCT Blood Glucose.: 123 mg/dL (06-04-18 @ 16:37)  POCT Blood Glucose.: 92 mg/dL (06-04-18 @ 11:37)  POCT Blood Glucose.: 170 mg/dL (06-04-18 @ 08:35)  POCT Blood Glucose.: 74 mg/dL (06-04-18 @ 07:41)  POCT Blood Glucose.: 129 mg/dL (06-03-18 @ 22:03)  POCT Blood Glucose.: 82 mg/dL (06-03-18 @ 16:16)  POCT Blood Glucose.: 140 mg/dL (06-03-18 @ 12:04)  POCT Blood Glucose.: 99 mg/dL (06-03-18 @ 07:31)  POCT Blood Glucose.: 156 mg/dL (06-02-18 @ 22:52)  POCT Blood Glucose.: 65 mg/dL (06-02-18 @ 22:00)  POCT Blood Glucose.: 66 mg/dL (06-02-18 @ 21:56)                          8.6    3.0   )-----------( 422      ( 04 Jun 2018 07:30 )             27.5                 06-04    138  |  101  |  18  ----------------------------<  100<H>  4.2   |  25  |  1.23    Ca    8.6      04 Jun 2018 07:30  Mg     2.4     06-03    TPro  6.3  /  Alb  3.2<L>  /  TBili  1.0  /  DBili  x   /  AST  19  /  ALT  18  /  AlkPhos  57  06-04                Thyroid Function Tests:  05-24 @ 05:48 TSH 0.86 FreeT4 -- T3 61 Anti TPO -- Anti Thyroglobulin Ab -- TSI --      Hemoglobin A1C, Whole Blood: 10.4 % <H> [4.0 - 5.6] (05-23-18 @ 20:06)  Hemoglobin A1C, Whole Blood: 5.3 % [4.0 - 5.6] (03-18-18 @ 11:20)

## 2018-06-04 NOTE — PROGRESS NOTE ADULT - SUBJECTIVE AND OBJECTIVE BOX
INFECTIOUS DISEASES FOLLOW UP-- Sujey Lujan  [phone]    This is a follow up note for this  68yMale with  Hyperglycemia      ROS:  CONSTITUTIONAL:  No fever, good appetite  CARDIOVASCULAR:  No chest pain or palpitations  RESPIRATORY:  No dyspnea  GASTROINTESTINAL:  No nausea, vomiting, diarrhea, or abdominal pain  GENITOURINARY:  No dysuria  NEUROLOGIC:  No headache,     Allergies    No Known Allergies    Intolerances        ANTIBIOTICS/RELEVANT:  antimicrobials  atovaquone Suspension 1500 milliGRAM(s) Oral daily  nystatin    Suspension 015792 Unit(s) Oral every 6 hours  valGANciclovir 450 milliGRAM(s) Oral every 12 hours    immunologic:  mycophenolate mofetil 500 milliGRAM(s) Oral <User Schedule>  tacrolimus 7.5 milliGRAM(s) Oral <User Schedule>    OTHER:  aspirin enteric coated 81 milliGRAM(s) Oral daily  calcium carbonate 1250 mG + Vitamin D (OsCal 500 + D) 2 Tablet(s) Oral two times a day  diltiazem    milliGRAM(s) Oral daily  famotidine    Tablet 20 milliGRAM(s) Oral two times a day  insulin glargine Injectable (LANTUS) 9 Unit(s) SubCutaneous at bedtime  insulin lispro (HumaLOG) corrective regimen sliding scale   SubCutaneous at bedtime  insulin lispro (HumaLOG) corrective regimen sliding scale   SubCutaneous three times a day before meals  insulin lispro Injectable (HumaLOG) 4 Unit(s) SubCutaneous before lunch  insulin lispro Injectable (HumaLOG) 3 Unit(s) SubCutaneous before dinner  insulin lispro Injectable (HumaLOG) 7 Unit(s) SubCutaneous before breakfast  magnesium oxide 400 milliGRAM(s) Oral every 12 hours  mavyret 100mg/40mg  tablet 3 Tablet(s) 3 Tablet(s) Oral daily  multivitamin 1 Tablet(s) Oral daily  mupirocin 2% Ointment 1 Application(s) Topical two times a day  pravastatin 20 milliGRAM(s) Oral at bedtime  predniSONE   Tablet 7.5 milliGRAM(s) Oral two times a day  silver sulfADIAZINE 1% Cream 1 Application(s) Topical two times a day  sodium bicarbonate 1300 milliGRAM(s) Oral every 8 hours  sodium chloride 0.9% lock flush 3 milliLiter(s) IV Push every 8 hours      Objective:  Vital Signs Last 24 Hrs  T(C): 36.7 (04 Jun 2018 14:30), Max: 36.9 (03 Jun 2018 22:08)  T(F): 98 (04 Jun 2018 14:30), Max: 98.4 (03 Jun 2018 22:08)  HR: 98 (04 Jun 2018 14:30) (93 - 101)  BP: 104/71 (04 Jun 2018 14:30) (103/74 - 118/82)  BP(mean): --  RR: 18 (04 Jun 2018 14:30) (17 - 18)  SpO2: 99% (04 Jun 2018 14:30) (97% - 99%)    PHYSICAL EXAM:  Constitutional:no acute distress, good spirits  Eyes:WALT, EOMI  Ear/Nose/Throat: no oral lesions, 	  Respiratory: clear BL anteriorly  sternotomy healed  Cardiovascular: S1S2  Gastrointestinal:soft, (+) BS, no tenderness  blood blister like lesion on left abdomen has again become firm  Extremities:no e/e/c right middle finger blackened tip unchanged  No Lymphadenopathy  IV sites not inflammed.    LABS:                        8.6    3.0   )-----------( 422      ( 04 Jun 2018 07:30 )             27.5     06-04    138  |  101  |  18  ----------------------------<  100<H>  4.2   |  25  |  1.23    Ca    8.6      04 Jun 2018 07:30  Mg     2.4     06-03    TPro  6.3  /  Alb  3.2<L>  /  TBili  1.0  /  DBili  x   /  AST  19  /  ALT  18  /  AlkPhos  57  06-04          MICROBIOLOGY:            RECENT CULTURES:  05-29 @ 19:07  Skin Skin, abdomen  --  --  --    No growth at 48 hours  --      RADIOLOGY & ADDITIONAL STUDIES:    < from: CT Abdomen and Pelvis No Cont (06.03.18 @ 13:46) >  IMPRESSION:   1.  No retroperitoneal hematoma.   2.  A 2.2 cm left lower lobe nodule is new from 3/20/2018. A dedicated   chest CT is recommended for further evaluation.  3.  Small loculated right pleural effusion with adjacent passive   atelectasis is decreased since prior study 3/20/2018.    < end of copied text >    < from: [facility] Duplex Upper Ext Vein Scan, Bilat (06.04.18 @ 14:21) >  Impression: No acute DVT identified.    < end of copied text >

## 2018-06-04 NOTE — PROGRESS NOTE ADULT - ASSESSMENT
Lying in bed.  Mood "OK".  Denied anxiety; appeared to demonstrate feelings of irritation and misunderstanding at times.  Coping strategies include watching TV and lying down.  Reportedly walking 2 laps per day, with some fatigue and SOB, which seems to have improved; denied dizziness.  Sleeping well but waking up twice per night to use bathroom and then watches TV.  Appetite good but complained of disliking the food.  Complained of occasional right hand numbness; staff aware.  Current stressors include bed not being made until 1:00 PM; staff aware.  Also demonstrated some upset regarding not being visited enough recently by staff, including LVAD team and psychologist.      Dx: r/o Adjustment disorder F43    Recommendations: Behavioral Cardiology will continue to follow as needed.  Visits by staff when possible.    Rosio Dacosta M.A.  Psychology Extern

## 2018-06-04 NOTE — PROGRESS NOTE ADULT - ATTENDING COMMENTS
Admitted with new onset DM and now on insulin with appropriate glycemic control. Overall doing well and looks good.  He was scheduled for routine surveillance RHC/EMB on Wed so we will do it tomorrow instead prior to discharge.    Given the VT over the weekend and early AMR, we will also do LHC tomorrow to r/o early onset TCAD.  Lovenox stopped given plan for cath tomorrow, but will repeat BUE Duplex to determine if ongoing AC is necessary.    Please change the calcium carbonate to calcium citrate +D.  Given that he missed the [place] Sun and today, would keep the tacrolimus at 7.5 mg po Q12h as he will take the dose tonight. The interaction will boost the tacro level.  May be able to stop nystatin tomorrow pending final recs from Dr. Lujan.

## 2018-06-04 NOTE — CHART NOTE - NSCHARTNOTEFT_GEN_A_CORE
Source: Patient [ x ]    Family [ ]     other [ x ] RN, Comprehensive chart review     Pt seen for nutrition follow up. Chart reviewed, events noted- d/c cancelled as home care not arranged. Reports decreased PO intake as he dislikes institutional food due to multiple hospital foods. Pt also expressed dissatisfaction with restrictive therapeutic diet. Discussed relationship of therapeutic diet guidelines to health/disease, pt stated he understood but continued to request items not within diet guidelines. Pt refused review of medical nutrition therapy for DM, heart health, food safety, and nutrition guidelines for after transplant; therefore, suspect pt will have poor compliance to therapeutic diet- d/w team.       Diet : Consistent CHO, low fiber, no concentrated potassium      Admission Weight: 66.5kg  Current Weight: 68.6kg  Weight fluctuations noted, likely due to fluid shifts. No peripheral edema noted at this time.    Pertinent Medications: MEDICATIONS  (STANDING):  aspirin enteric coated 81 milliGRAM(s) Oral daily  atorvastatin 10 milliGRAM(s) Oral at bedtime  atovaquone Suspension 1500 milliGRAM(s) Oral daily  calcium carbonate 1250 mG + Vitamin D (OsCal 500 + D) 2 Tablet(s) Oral two times a day  diltiazem    milliGRAM(s) Oral daily  famotidine    Tablet 20 milliGRAM(s) Oral two times a day  insulin glargine Injectable (LANTUS) 9 Unit(s) SubCutaneous at bedtime  insulin lispro (HumaLOG) corrective regimen sliding scale   SubCutaneous at bedtime  insulin lispro (HumaLOG) corrective regimen sliding scale   SubCutaneous three times a day before meals  insulin lispro Injectable (HumaLOG) 4 Unit(s) SubCutaneous before lunch  insulin lispro Injectable (HumaLOG) 3 Unit(s) SubCutaneous before dinner  insulin lispro Injectable (HumaLOG) 9 Unit(s) SubCutaneous before breakfast  magnesium oxide 400 milliGRAM(s) Oral every 12 hours  mavyret 100mg/40mg  tablet 3 Tablet(s) 3 Tablet(s) Oral daily  multivitamin 1 Tablet(s) Oral daily  mupirocin 2% Ointment 1 Application(s) Topical two times a day  mycophenolate mofetil 500 milliGRAM(s) Oral <User Schedule>  nystatin    Suspension 685523 Unit(s) Oral every 6 hours  predniSONE   Tablet 7.5 milliGRAM(s) Oral two times a day  silver sulfADIAZINE 1% Cream 1 Application(s) Topical two times a day  sodium bicarbonate 1300 milliGRAM(s) Oral every 8 hours  sodium chloride 0.9% lock flush 3 milliLiter(s) IV Push every 8 hours  tacrolimus 7.5 milliGRAM(s) Oral <User Schedule>  valGANciclovir 450 milliGRAM(s) Oral every 12 hours    MEDICATIONS  (PRN):    Pertinent Labs:    Complete Blood Count (06.04.18 @ 07:30)    Hemoglobin: 8.6 g/dL    Hematocrit: 27.5 %          Skin:     Estimated Needs:   [ ] no change since previous assessment  [ ] recalculated:       Previous Nutrition Diagnosis:     [ ] Inadequate Energy Intake [ ]Inadequate Oral Intake [ ] Excessive Energy Intake     [ ] Underweight [ ] Increased Nutrient Needs [ ] Overweight/Obesity     [ ] Altered GI Function [ ] Unintended Weight Loss [ ] Food & Nutrition Related Knowledge Deficit [ ] Malnutrition          Nutrition Diagnosis is [ ] ongoing  [ ] resolved [ ] not applicable          New Nutrition Diagnosis: [ ] not applicable    [ ] Inadequate Protein Energy Intake [ ]Inadequate Oral Intake [ ] Excessive Energy Intake     [ ] Underweight [ ] Increased Nutrient Needs [ ] Overweight/Obesity     [ ] Altered GI Function [ ] Unintended Weight Loss [ ] Food & Nutrition Related Knowledge Deficit[ ] Limited Adherence to nutrition related recommendations [ ] Malnutrition  [ ] other: Free text       Related to:      As evidenced by:      Interventions:     Recommend    [ ] Change Diet To:    [ ] Nutrition Supplement    [ ] Nutrition Support    [ ] Other:        Monitoring and Evaluation:     [ ] PO intake [ ] Tolerance to diet prescription [ ] weights [ ] follow up per protocol    [ ] other: Source: Patient [ x ]    Family [ ]     other [ x ] RN, Comprehensive chart review     Pt seen for nutrition follow up. Chart reviewed, events noted- d/c cancelled as home care not arranged. Reports decreased PO intake as he dislikes institutional food due to multiple hospital foods. Pt also expressed dissatisfaction with restrictive therapeutic diet. Discussed relationship of therapeutic diet guidelines to health/disease, pt stated he understood but continued to request items not within diet guidelines. Pt refused review of medical nutrition therapy for DM, heart health, food safety, and nutrition guidelines for after transplant; therefore, suspect pt will have poor compliance to therapeutic diet- d/w team.       Diet : Consistent CHO, low fiber, no concentrated potassium      Admission Weight: 66.5kg  Current Weight: 68.6kg  Weight fluctuations noted, likely due to fluid shifts. No peripheral edema noted at this time.    Pertinent Medications: MEDICATIONS  (STANDING):  aspirin enteric coated 81 milliGRAM(s) Oral daily  atorvastatin 10 milliGRAM(s) Oral at bedtime  atovaquone Suspension 1500 milliGRAM(s) Oral daily  calcium carbonate 1250 mG + Vitamin D (OsCal 500 + D) 2 Tablet(s) Oral two times a day  diltiazem    milliGRAM(s) Oral daily  famotidine    Tablet 20 milliGRAM(s) Oral two times a day  insulin glargine Injectable (LANTUS) 9 Unit(s) SubCutaneous at bedtime  insulin lispro (HumaLOG) corrective regimen sliding scale   SubCutaneous at bedtime  insulin lispro (HumaLOG) corrective regimen sliding scale   SubCutaneous three times a day before meals  insulin lispro Injectable (HumaLOG) 4 Unit(s) SubCutaneous before lunch  insulin lispro Injectable (HumaLOG) 3 Unit(s) SubCutaneous before dinner  insulin lispro Injectable (HumaLOG) 9 Unit(s) SubCutaneous before breakfast  magnesium oxide 400 milliGRAM(s) Oral every 12 hours  mavyret 100mg/40mg  tablet 3 Tablet(s) 3 Tablet(s) Oral daily  multivitamin 1 Tablet(s) Oral daily  mupirocin 2% Ointment 1 Application(s) Topical two times a day  mycophenolate mofetil 500 milliGRAM(s) Oral <User Schedule>  nystatin    Suspension [id number] Unit(s) Oral every 6 hours  predniSONE   Tablet 7.5 milliGRAM(s) Oral two times a day  silver sulfADIAZINE 1% Cream 1 Application(s) Topical two times a day  sodium bicarbonate 1300 milliGRAM(s) Oral every 8 hours  sodium chloride 0.9% lock flush 3 milliLiter(s) IV Push every 8 hours  tacrolimus 7.5 milliGRAM(s) Oral <User Schedule>  valGANciclovir 450 milliGRAM(s) Oral every 12 hours    MEDICATIONS  (PRN):    Pertinent Labs:    Complete Blood Count (06.04.18 @ 07:30)    Hemoglobin: 8.6 g/dL - low    Hematocrit: 27.5 % - low  Comprehensive Metabolic Panel (06.04.18 @ 07:30)    Sodium, Serum: 138 mmol/L    Potassium, Serum: 4.2 mmol/L    Chloride, Serum: 101 mmol/L    Carbon Dioxide, Serum: 25 mmol/L    Anion Gap, Serum: 12 mmol/L    Blood Urea Nitrogen, Serum: 18 mg/dL    Creatinine, Serum: 1.23 mg/dL    Glucose, Serum: 100 mg/dL - high    Calcium, Total Serum: 8.6 mg/dL    Protein Total, Serum: 6.3 g/dL    Albumin, Serum: 3.2 g/dL - low    Bilirubin Total, Serum: 1.0 mg/dL    Alkaline Phosphatase, Serum: 57 U/L    Aspartate Aminotransferase (AST/SGOT): 19 U/L    Alanine Aminotransferase (ALT/SGPT): 18 U/L  Magnesium, Serum (06.03.18 @ 07:39)    Magnesium, Serum: 2.4 mg/dL    Point of Care Testing BG: (6/4)4-170, (6/3)      Skin: No pressure ulcers noted.    Estimated Needs:   [ x ] no change since previous assessment  [ ] recalculated:       Previous Nutrition Diagnosis:   Altered nutrition - related laboratory values remains, addressed with diet education.  Unintended weight loss improving, addressed with PO diet.         New Nutrition Diagnosis: [ x ] not applicable      Interventions:   1. Encourage PO intake with all meals.  2. Provide food preferences within therapeutic diet when requested.   3. Encourage use of alternate menu options as needed.  4. Recommend Glucerna Shakes 2 cans/day to promote PO intake.         Monitoring and Evaluation:     [ x ] PO intake [ x ] Tolerance to diet prescription [ x ] weights [ x ] follow up per protocol    [ ] other:

## 2018-06-04 NOTE — PROGRESS NOTE ADULT - ASSESSMENT
68M s/p cardiac transplant 2/23/18 for NICM, on Tacrolimus, Cellcept and Prednisone 12.5mg BID, post op course complicated by ? graft rejection requiring plasmapheresis in April ,4/19-4/26/18. Admitted 5/23/18 with marked medication-induced hyperglycemia, now much improved blood sugars on insulin. Immune compromised but clinically well, no fevers, does not seem to have been provoked by an infection. Cultures in lab. Acquired HCV from donor, on treatment with Mavyret. CMV viral load negative 4/19, on Valgancyclovir prophylaxis. Blood culture is negative, urine culture with Kleb oxytoca    Recommend    -continue Mepron prophylaxis and Valganciclovir and Nystatin  while he remains on supra-physiologic prednisone doses  - new lung nodule seen on chest CT scan- patient asymptomatic, feels well, but will check fungitell and obtain chest chest CT scan to better assess  - would ask Dermatology to re-evaluate the abdominal 'blister' for a possible skin biopsy, culture for AFB, fungal, bacterial pathogens but no other new lesions identified      Gary Lujan MD  [phone]  After 5pm/weekends [phone]

## 2018-06-04 NOTE — PROGRESS NOTE ADULT - PROBLEM SELECTOR PLAN 2
CK FK level Daily  Prograf 7.5mg bid ,     Continue with Cellcept to 750 mg PO bid.  Continue with prednisone  BID

## 2018-06-05 ENCOUNTER — RESULT REVIEW (OUTPATIENT)
Age: 68
End: 2018-06-05

## 2018-06-05 DIAGNOSIS — Z94.1 HEART TRANSPLANT STATUS: ICD-10-CM

## 2018-06-05 DIAGNOSIS — I82.629 ACUTE EMBOLISM AND THROMBOSIS OF DEEP VEINS OF UNSPECIFIED UPPER EXTREMITY: ICD-10-CM

## 2018-06-05 DIAGNOSIS — D89.9 DISORDER INVOLVING THE IMMUNE MECHANISM, UNSPECIFIED: ICD-10-CM

## 2018-06-05 LAB
ANION GAP SERPL CALC-SCNC: 11 MMOL/L — SIGNIFICANT CHANGE UP (ref 5–17)
BASOPHILS # BLD AUTO: 0 K/UL
BASOPHILS NFR BLD AUTO: 0 %
BUN SERPL-MCNC: 19 MG/DL — SIGNIFICANT CHANGE UP (ref 7–23)
CALCIUM SERPL-MCNC: 8.8 MG/DL — SIGNIFICANT CHANGE UP (ref 8.4–10.5)
CHLORIDE SERPL-SCNC: 102 MMOL/L — SIGNIFICANT CHANGE UP (ref 96–108)
CO2 SERPL-SCNC: 28 MMOL/L — SIGNIFICANT CHANGE UP (ref 22–31)
CREAT SERPL-MCNC: 1.28 MG/DL — SIGNIFICANT CHANGE UP (ref 0.5–1.3)
EOSINOPHIL # BLD AUTO: 0 K/UL
EOSINOPHIL NFR BLD AUTO: 0 %
GLUCOSE BLDC GLUCOMTR-MCNC: 118 MG/DL — HIGH (ref 70–99)
GLUCOSE BLDC GLUCOMTR-MCNC: 89 MG/DL — SIGNIFICANT CHANGE UP (ref 70–99)
GLUCOSE BLDC GLUCOMTR-MCNC: 98 MG/DL — SIGNIFICANT CHANGE UP (ref 70–99)
GLUCOSE SERPL-MCNC: 86 MG/DL — SIGNIFICANT CHANGE UP (ref 70–99)
HCT VFR BLD CALC: 25 % — LOW (ref 39–50)
HCT VFR BLD CALC: 32.2 %
HGB BLD-MCNC: 8.2 G/DL — LOW (ref 13–17)
HGB BLD-MCNC: 9.6 G/DL
LYMPHOCYTES # BLD AUTO: 0.4 K/UL
LYMPHOCYTES NFR BLD AUTO: 3 %
MAGNESIUM SERPL-MCNC: 2.1 MG/DL
MAN DIFF?: NORMAL
MCHC RBC-ENTMCNC: 27.7 PG
MCHC RBC-ENTMCNC: 29.8 GM/DL
MCHC RBC-ENTMCNC: 31.7 PG — SIGNIFICANT CHANGE UP (ref 27–34)
MCHC RBC-ENTMCNC: 32.9 GM/DL — SIGNIFICANT CHANGE UP (ref 32–36)
MCV RBC AUTO: 92.8 FL
MCV RBC AUTO: 96.5 FL — SIGNIFICANT CHANGE UP (ref 80–100)
MONOCYTES # BLD AUTO: 0.66 K/UL
MONOCYTES NFR BLD AUTO: 5 %
NEUTROPHILS # BLD AUTO: 12.12 K/UL
NEUTROPHILS NFR BLD AUTO: 92 %
PLATELET # BLD AUTO: 231 K/UL
PLATELET # BLD AUTO: 435 K/UL — HIGH (ref 150–400)
POTASSIUM SERPL-MCNC: 4.3 MMOL/L — SIGNIFICANT CHANGE UP (ref 3.5–5.3)
POTASSIUM SERPL-SCNC: 4.3 MMOL/L — SIGNIFICANT CHANGE UP (ref 3.5–5.3)
RBC # BLD: 2.59 M/UL — LOW (ref 4.2–5.8)
RBC # BLD: 3.47 M/UL
RBC # FLD: 19.4 % — HIGH (ref 10.3–14.5)
RBC # FLD: 21.5 %
SODIUM SERPL-SCNC: 141 MMOL/L — SIGNIFICANT CHANGE UP (ref 135–145)
TACROLIMUS SERPL-MCNC: 8.3 NG/ML — SIGNIFICANT CHANGE UP
WBC # BLD: 2.8 K/UL — LOW (ref 3.8–10.5)
WBC # FLD AUTO: 13.17 K/UL
WBC # FLD AUTO: 2.8 K/UL — LOW (ref 3.8–10.5)

## 2018-06-05 PROCEDURE — 88342 IMHCHEM/IMCYTCHM 1ST ANTB: CPT | Mod: 26

## 2018-06-05 PROCEDURE — 88307 TISSUE EXAM BY PATHOLOGIST: CPT | Mod: 26

## 2018-06-05 PROCEDURE — 99233 SBSQ HOSP IP/OBS HIGH 50: CPT

## 2018-06-05 PROCEDURE — 99152 MOD SED SAME PHYS/QHP 5/>YRS: CPT

## 2018-06-05 PROCEDURE — 88350 IMFLUOR EA ADDL 1ANTB STN PX: CPT | Mod: 26

## 2018-06-05 PROCEDURE — 99223 1ST HOSP IP/OBS HIGH 75: CPT

## 2018-06-05 PROCEDURE — 93306 TTE W/DOPPLER COMPLETE: CPT | Mod: 26

## 2018-06-05 PROCEDURE — 88313 SPECIAL STAINS GROUP 2: CPT | Mod: 26

## 2018-06-05 PROCEDURE — 88341 IMHCHEM/IMCYTCHM EA ADD ANTB: CPT | Mod: 26

## 2018-06-05 PROCEDURE — 93505 ENDOMYOCARDIAL BIOPSY: CPT | Mod: 26

## 2018-06-05 PROCEDURE — 88346 IMFLUOR 1ST 1ANTB STAIN PX: CPT | Mod: 26

## 2018-06-05 RX ORDER — INSULIN LISPRO 100/ML
3 VIAL (ML) SUBCUTANEOUS
Qty: 0 | Refills: 0 | Status: DISCONTINUED | OUTPATIENT
Start: 2018-06-05 | End: 2018-06-06

## 2018-06-05 RX ORDER — TACROLIMUS 5 MG/1
8 CAPSULE ORAL
Qty: 0 | Refills: 0 | Status: DISCONTINUED | OUTPATIENT
Start: 2018-06-05 | End: 2018-06-06

## 2018-06-05 RX ORDER — INSULIN GLARGINE 100 [IU]/ML
7 INJECTION, SOLUTION SUBCUTANEOUS EVERY MORNING
Qty: 0 | Refills: 0 | Status: DISCONTINUED | OUTPATIENT
Start: 2018-06-06 | End: 2018-06-06

## 2018-06-05 RX ADMIN — Medication 7.5 MILLIGRAM(S): at 18:28

## 2018-06-05 RX ADMIN — Medication 1300 MILLIGRAM(S): at 05:45

## 2018-06-05 RX ADMIN — TACROLIMUS 7.5 MILLIGRAM(S): 5 CAPSULE ORAL at 08:15

## 2018-06-05 RX ADMIN — VALGANCICLOVIR 450 MILLIGRAM(S): 450 TABLET, FILM COATED ORAL at 18:27

## 2018-06-05 RX ADMIN — MYCOPHENOLATE MOFETIL 500 MILLIGRAM(S): 250 CAPSULE ORAL at 08:16

## 2018-06-05 RX ADMIN — Medication 1 TABLET(S): at 18:26

## 2018-06-05 RX ADMIN — Medication 180 MILLIGRAM(S): at 05:45

## 2018-06-05 RX ADMIN — Medication 1300 MILLIGRAM(S): at 18:25

## 2018-06-05 RX ADMIN — Medication 7.5 MILLIGRAM(S): at 05:45

## 2018-06-05 RX ADMIN — Medication 81 MILLIGRAM(S): at 18:27

## 2018-06-05 RX ADMIN — SODIUM CHLORIDE 3 MILLILITER(S): 9 INJECTION INTRAMUSCULAR; INTRAVENOUS; SUBCUTANEOUS at 18:29

## 2018-06-05 RX ADMIN — Medication 20 MILLIGRAM(S): at 21:37

## 2018-06-05 RX ADMIN — Medication 1 APPLICATION(S): at 05:46

## 2018-06-05 RX ADMIN — MUPIROCIN 1 APPLICATION(S): 20 OINTMENT TOPICAL at 05:46

## 2018-06-05 RX ADMIN — VALGANCICLOVIR 450 MILLIGRAM(S): 450 TABLET, FILM COATED ORAL at 05:46

## 2018-06-05 RX ADMIN — SODIUM CHLORIDE 3 MILLILITER(S): 9 INJECTION INTRAMUSCULAR; INTRAVENOUS; SUBCUTANEOUS at 05:46

## 2018-06-05 RX ADMIN — Medication 500000 UNIT(S): at 00:14

## 2018-06-05 RX ADMIN — Medication 500000 UNIT(S): at 05:46

## 2018-06-05 RX ADMIN — ATOVAQUONE 1500 MILLIGRAM(S): 750 SUSPENSION ORAL at 18:29

## 2018-06-05 RX ADMIN — MUPIROCIN 1 APPLICATION(S): 20 OINTMENT TOPICAL at 19:44

## 2018-06-05 RX ADMIN — MAGNESIUM OXIDE 400 MG ORAL TABLET 400 MILLIGRAM(S): 241.3 TABLET ORAL at 05:46

## 2018-06-05 RX ADMIN — Medication 1 APPLICATION(S): at 19:44

## 2018-06-05 RX ADMIN — FAMOTIDINE 20 MILLIGRAM(S): 10 INJECTION INTRAVENOUS at 05:45

## 2018-06-05 RX ADMIN — Medication 1300 MILLIGRAM(S): at 21:37

## 2018-06-05 RX ADMIN — MAGNESIUM OXIDE 400 MG ORAL TABLET 400 MILLIGRAM(S): 241.3 TABLET ORAL at 18:28

## 2018-06-05 RX ADMIN — FAMOTIDINE 20 MILLIGRAM(S): 10 INJECTION INTRAVENOUS at 18:26

## 2018-06-05 RX ADMIN — Medication 2 TABLET(S): at 05:45

## 2018-06-05 RX ADMIN — MYCOPHENOLATE MOFETIL 500 MILLIGRAM(S): 250 CAPSULE ORAL at 20:04

## 2018-06-05 RX ADMIN — TACROLIMUS 8 MILLIGRAM(S): 5 CAPSULE ORAL at 20:05

## 2018-06-05 NOTE — PROGRESS NOTE ADULT - PROBLEM SELECTOR PROBLEM 3
Immunosuppression
Transplant, organ
Hepatitis C virus
Deep vein thrombosis (DVT) of other vein of upper extremity
Transplant, organ
Deep vein thrombosis (DVT) of other vein of upper extremity
Transplant, organ

## 2018-06-05 NOTE — PROGRESS NOTE ADULT - PROBLEM SELECTOR PLAN 3
- Tacro level 9.1; continue tacro 7.5 mg Q12, goal tacro level 10-12   - Continue CellCept at 500 mg po Q12h given leukopenia  - on pred 7.5 q12; taper pending EMB result tomorrow

## 2018-06-05 NOTE — PROGRESS NOTE ADULT - PROBLEM SELECTOR PROBLEM 4
DVT of upper extremity (deep vein thrombosis)
Essential hypertension
Deep vein thrombosis (DVT) of other vein of upper extremity
NORMAN (acute kidney injury)
Deep vein thrombosis (DVT) of other vein of upper extremity

## 2018-06-05 NOTE — PROGRESS NOTE ADULT - ATTENDING COMMENTS
Mr. Baez is looking and feeling very well. Seen in cath recovery area s/p RHC/LHC with IVUS and EMB.  Per Dr. Carpenter, hemodynamics normal, PA 55%, no angiographic or IVUS CAD. EMB samples sent. 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 PCW ~7 so doesn't appear to be hemodynamically significant.    He has better glycemic control now on insulin.   No longer needs systemic AC as BUE DVTs have resolved after 3 months of AC. Lovenox stopped.  Will increase the tacrolimus to 8 mg po Q12h, but will follow levels closely when he goes back on the MedPlexusight.  May be able to stop nystatin now pending final recs from Dr. Lujan.  Plan for home tomorrow morning.

## 2018-06-05 NOTE — PROGRESS NOTE ADULT - SUBJECTIVE AND OBJECTIVE BOX
Subjective: Pt states "I'm going for my test today" denies any CP, SOB, N/V and palpitations. No acute events overnight.     Telemetry:  SR 1st degree 90  Vital Signs Last 24 Hrs  T(C): 36.9 (18 @ 05:38), Max: 36.9 (18 @ 05:38)  T(F): 98.4 (18 @ 05:38), Max: 98.4 (18 @ 05:38)  HR: 96 (18 @ 05:38) (96 - 100)  BP: 115/83 (18 @ 05:38) (104/71 - 115/83)  RR: 18 (18 @ 05:38) (18 - 18)  SpO2: 98% (18 @ 05:38) (98% - 99%)           06-05 @ 07:01  -  05 @ 13:37  --------------------------------------------------------  IN: 0 mL / OUT: 0 mL / NET: 0 mL      Daily Weight in k.7 (2018 09:01)    CAPILLARY BLOOD GLUCOSE  89 - 105    PHYSICAL EXAM  Neurology: A&Ox3, nonfocal, no gross deficits  CV : RRR+S1S2  Sternal Wound: MSI healed  Lungs: Respirations non-labored, B/L BS CTA  Abdomen: Soft, NT/ND, +BSx4Q, (-)N/V/D  : Voiding without difficulty  Extremities: B/L LE no edema, negative calf tenderness, +PP       MEDICATIONS  aspirin enteric coated 81 milliGRAM(s) Oral daily  atovaquone Suspension 1500 milliGRAM(s) Oral daily  Calcium Citrate Vit D 315mG/250 IU 2 Tablet(s) 2 Tablet(s) Oral <User Schedule>  diltiazem    milliGRAM(s) Oral daily  famotidine    Tablet 20 milliGRAM(s) Oral two times a day  insulin glargine Injectable (LANTUS) 9 Unit(s) SubCutaneous at bedtime  insulin lispro (HumaLOG) corrective regimen sliding scale   SubCutaneous at bedtime  insulin lispro (HumaLOG) corrective regimen sliding scale   SubCutaneous three times a day before meals  insulin lispro Injectable (HumaLOG) 4 Unit(s) SubCutaneous before lunch  insulin lispro Injectable (HumaLOG) 3 Unit(s) SubCutaneous before dinner  insulin lispro Injectable (HumaLOG) 7 Unit(s) SubCutaneous before breakfast  magnesium oxide 400 milliGRAM(s) Oral every 12 hours  mavyret 100mg/40mg  tablet 3 Tablet(s) 3 Tablet(s) Oral daily  multivitamin 1 Tablet(s) Oral daily  mupirocin 2% Ointment 1 Application(s) Topical two times a day  mycophenolate mofetil 500 milliGRAM(s) Oral <User Schedule>  nystatin    Suspension 685677 Unit(s) Oral every 6 hours  pravastatin 20 milliGRAM(s) Oral at bedtime  predniSONE   Tablet 7.5 milliGRAM(s) Oral two times a day  silver sulfADIAZINE 1% Cream 1 Application(s) Topical two times a day  sodium bicarbonate 1300 milliGRAM(s) Oral every 8 hours  sodium chloride 0.9% lock flush 3 milliLiter(s) IV Push every 8 hours  tacrolimus 7.5 milliGRAM(s) Oral <User Schedule>  valGANciclovir 450 milliGRAM(s) Oral every 12 hours      Discussed with Cardiothoracic Team at AM rounds.

## 2018-06-05 NOTE — PROGRESS NOTE ADULT - PROBLEM SELECTOR PLAN 1
Continued insulin teaching  Controlled carb diet  FS AC HS  Lantus q hs  AC humalog with sliding scale

## 2018-06-05 NOTE — PROGRESS NOTE ADULT - PROBLEM SELECTOR PLAN 1
Inpatient:  -test BG AC/HS  -Change Lantus to 7  units Qam  -Adjust Humalog to 3 units ac meals. (please hold if patient not eating)  -c/w Humalog low correction scale AC and Low HS scale  Discharge plan depending on BG levels and insulin needs  -Will need home care to reinforce education. Per pt brother came over the weekend and learned use of insulin pen and assist pt at home.  -Has f/u apt at [facility] clinic on 6/21/18 at 10:40am . [address] [facility].  -Also has f/u apts on 6/11 with GIOVANI and 7/30 with Dr Antonio endocrinologist at [address] [place] [address] [address]. Phone   Pt has all relevant glucometer and insulin supplies at home  -Plan discussed with pt/team.  Contact info: [phone] (24/7). pager [phone]

## 2018-06-05 NOTE — PROGRESS NOTE ADULT - PROBLEM SELECTOR PLAN 1
lantus and premeal   endo following  Glucose monitoring AC and HS. Continue lantus and premeal   endo following  Glucose monitoring AC and HS.  Continue insulin teaching to pt and family member, demonstrate teach back.  Per Endo can discharge on Lantus 9 and Novolog 5 units TID w/meals  Has f/u apt at endo clinic on 6/21/18 at 10:40am . [address] [facility] [address] [facility].  -Also has f/u apts on 6/11 with GIOVANI and 7/30 with Dr Antonio endocrinologist at [address] [place] [address] [address]. Phone   Pt has all relevant glucometer and insulin supplies at home

## 2018-06-05 NOTE — PROGRESS NOTE ADULT - PROBLEM SELECTOR PLAN 2
CK tacrolimus level Daily  Prograf 7.5mg bid ,     Continue with Cellcept to 750 mg PO bid.  Continue with prednisone  BID CK tacrolimus level Daily  Prograf 8mg bid ,     Continue with Cellcept to 750 mg PO bid.  Continue with prednisone  BID  F/U cardiac biopsy results from 6/5

## 2018-06-05 NOTE — PROGRESS NOTE ADULT - ASSESSMENT
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 Now presenting with hyperglycemia, glucose initally >700,  likely 2/2 to steroids vs. prograf.   Hospital course:  5/24: Weaned off Insulin gtt transitioned to basal bolus insulin. Glucometer/Insulin teaching initiated  5/25: Overnight: HR with low noted to be 75-80 with sleep; Hypotensive with SPB 91; Decrease Cardizem to 180 mg daily; Prograft level 17.5; Mild neutropenia WBC 3.4 Decrease valganciclovir 450 mg po daily and Cellcept to 750 mg PO BID. Maintain Tacrolimus 6 mg PO BID; Continue with Diabetic teaching. Endo following for hyperglycemia; Lantus increased to 13 units HA and pre meal increased to 8 units TID.  5/26 VVS; Continue with current medication regimen.  Monitor leukopenia WBC 3.1.  Awaiting HCV viral load. Hypotension SBP 90's, Norvasc and HCTZ D/C'd. Urine culture with Klebsiella oxytoca; patient asymptomatic per ID no abx warranted at this time--> continue to monitor.  Progaf level 15; continue with current dose. Supplement potassium 40 meq PO x 1 for K+ 3.8.    5/27: HCV viral load/CMV PCR pending.  s/p Fall from chair. Bed Alarm in place  5/28 VSS - no complaints - Prograf level = 11.1  continue Tacro@6 BID  5/29: HCV viral load/CMV PCR pending.  Peripheral IV replaced.   Disposition: Home once medically cleared    5/30 cmv, hep c neg.  Pt w small hematoma on the surface of his abd. derm consulted, cultures sent  Insulin teaching in progress.  5/31/18-D/c planning ? home friday 6/2     VSS   prograf dose increased to 8 mg  bid,  discharge cx  secobdary to pt " has no ride"  and home care asst   not set up,  6/3   wct   obtaining echo,   small drop in Hct   obtaining abd ct to assess  for bleed, bs  60  lantus decreased  6/4  R and L heart catheterization in am for Bx and evaluate coronary arteries.  UE duplex --negative for DVT.  CT chest ordered to eval lung nodule.  Tacro level 9.1.  Continue Prograf 7.5 BID --going to get Mayvret tonight.  Hold lovenox tonight and in am for cath.  6/5 CT Chest: new LLL nodule 2.5x2.5cm likely infectious in nature, small right pleural effusion. Tacro level 8.3. 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 Now presenting with hyperglycemia, glucose initally >700,  likely 2/2 to steroids vs. prograf.   Hospital course:  5/24: Weaned off Insulin gtt transitioned to basal bolus insulin. Glucometer/Insulin teaching initiated  5/25: Overnight: HR with low noted to be 75-80 with sleep; Hypotensive with SPB 91; Decrease Cardizem to 180 mg daily; Prograft level 17.5; Mild neutropenia WBC 3.4 Decrease valganciclovir 450 mg po daily and Cellcept to 750 mg PO BID. Maintain Tacrolimus 6 mg PO BID; Continue with Diabetic teaching. Endo following for hyperglycemia; Lantus increased to 13 units HA and pre meal increased to 8 units TID.  5/26 VVS; Continue with current medication regimen.  Monitor leukopenia WBC 3.1.  Awaiting HCV viral load. Hypotension SBP 90's, Norvasc and HCTZ D/C'd. Urine culture with Klebsiella oxytoca; patient asymptomatic per ID no abx warranted at this time--> continue to monitor.  Progaf level 15; continue with current dose. Supplement potassium 40 meq PO x 1 for K+ 3.8.    5/27: HCV viral load/CMV PCR pending.  s/p Fall from chair. Bed Alarm in place  5/28 VSS - no complaints - Prograf level = 11.1  continue Tacro@6 BID  5/29: HCV viral load/CMV PCR pending.  Peripheral IV replaced.   Disposition: Home once medically cleared    5/30 cmv, hep c neg.  Pt w small hematoma on the surface of his abd. derm consulted, cultures sent  Insulin teaching in progress.  5/31/18-D/c planning ? home friday 6/2     VSS   prograf dose increased to 8 mg  bid,  discharge cx  secobdary to pt " has no ride"  and home care asst   not set up,  6/3   wct   obtaining echo,   small drop in Hct   obtaining abd ct to assess  for bleed, bs  60  lantus decreased  6/4  R and L heart catheterization in am for Bx and evaluate coronary arteries.  UE duplex --negative for DVT.  CT chest ordered to eval lung nodule.  Tacro level 9.1.  Continue Prograf 7.5 BID --going to get Mayvret tonight.  Hold lovenox tonight and in am for cath.  6/5 CT Chest: new LLL nodule 2.5x2.5cm likely infectious in nature, small right pleural effusion. Will check fungitell per ID. Tacro level 8.3. Prograf increased to 8 BID. OK to continue off AC (SQ Lovenox) per Vascular Cardiology - Dr. Tavarez. F/U cardiac biopsy results and dc home in AM.

## 2018-06-05 NOTE — PROGRESS NOTE ADULT - ASSESSMENT
CAV PPx  Diabetes  -   Renal dysfunction   - improved  - continue monitoring for now  - on sodium bicarb for hyperkalemia which has resolved    Papular lesions  - Appears to have spontaneously occurred, does not appear to be related to trauma or Lovenox injection, unclear etiology; appreciate derm c/s  - coags wnl and factor Xa level 4 hours post next Lovenox dose were wnl   - culture results negative; HIV negative

## 2018-06-05 NOTE — PROGRESS NOTE ADULT - ASSESSMENT
67 y/o M w/h/o HTN/CHF/STV/Tony Fib>AICD/PAF/NICM>LVAD and more recently heart tx c/b DVT on Lovenox. Here with NODAT. Off unit most of the day today so unable to see. Evaluated BG levels and insulin requirements. BG levels too tightly controlled.  Pt migght benefit from Lantus in am with small doses of humalog ac meals. Not recommending to stop insulin due to the fact that pt will continue steroid and tacrolimus which are glucotoxic and put strength to pancreas.

## 2018-06-05 NOTE — PROGRESS NOTE ADULT - SUBJECTIVE AND OBJECTIVE BOX
Diabetes Follow up note: Unable to see pt. Pt off unit since am for card cath.  Reviewed BG levels and insulin doses. Pt  with BG 80s to low 100s while on present insulin regimen as follows:  MEDS:  insulin glargine Injectable (LANTUS) 9 Unit(s) SubCutaneous at bedtime  insulin lispro (HumaLOG) corrective regimen sliding scale   SubCutaneous at bedtime  insulin lispro (HumaLOG) corrective regimen sliding scale   SubCutaneous three times a day before meals  insulin lispro Injectable (HumaLOG) 4 Unit(s) SubCutaneous before lunch  insulin lispro Injectable (HumaLOG) 3 Unit(s) SubCutaneous before dinner  insulin lispro Injectable (HumaLOG) 7 Unit(s) SubCutaneous before breakfast  predniSONE   Tablet 7.5 milliGRAM(s) Oral two times a day  tacrolimus 8 milliGRAM(s) Oral <User Schedule>    LABS:  POCT Blood Glucose.: 98 mg/dL (06-05-18 @ 15:40)  POCT Blood Glucose.: 89 mg/dL (06-05-18 @ 07:34)  POCT Blood Glucose.: 105 mg/dL (06-04-18 @ 22:10)  POCT Blood Glucose.: 123 mg/dL (06-04-18 @ 16:37)  POCT Blood Glucose.: 92 mg/dL (06-04-18 @ 11:37)  POCT Blood Glucose.: 170 mg/dL (06-04-18 @ 08:35)  POCT Blood Glucose.: 74 mg/dL (06-04-18 @ 07:41)  POCT Blood Glucose.: 129 mg/dL (06-03-18 @ 22:03)                           8.2    2.8   )-----------( 435      ( 05 Jun 2018 07:22 )             25.0     06-05    141  |  102  |  19  ----------------------------<  86  4.3   |  28  |  1.28    Ca    8.8      05 Jun 2018 07:22    TPro  6.3  /  Alb  3.2<L>  /  TBili  1.0  /  DBili  x   /  AST  19  /  ALT  18  /  AlkPhos  57  06-04              Hemoglobin A1C, Whole Blood: 10.4 % <H> [4.0 - 5.6] (05-23-18 @ 20:06)  Hemoglobin A1C, Whole Blood: 5.3 % [4.0 - 5.6] (03-18-18 @ 11:20)

## 2018-06-05 NOTE — PROGRESS NOTE ADULT - PROBLEM SELECTOR PLAN 4
--Holding Lovenox for cath this morning  --Per Doppler study, UE DVT appears to be resolved, will determine length of course of AC/discontinuance with vascular cardiology

## 2018-06-05 NOTE — PROGRESS NOTE ADULT - PROBLEM SELECTOR PLAN 5
HCV donor: Continue with Mavyret 100mg/40mg 3 Tablet(s) Oral daily  PCP/Toxoplasmosis prophylaxis: mepron 1500 po daily   CMV prophylaxis: valcyte 450 mg PO daily

## 2018-06-05 NOTE — PROGRESS NOTE ADULT - SUBJECTIVE AND OBJECTIVE BOX
PAGER:  [phone]               [facility] [id number]              EMAIL [email or web address]   OFFICE [phone]                              ********VASCULAR MEDICINE & CARDIOLOGY CONSULT NOTE********                            Pt seen at bedside pre- RHC/ biopsy. Pt states that RUE swelling and pain is greatly improved. His R digit is healing. He has a blister on his abdomen that is being treated. He denies CP, SOB, palpitations.       HISTORY OF PRESENT ILLNESS:  HPI:  68 year old man with ACC/AHA stage D chronic systolic heart failure due to NICM s/p HM2 LVAD 6/17 c/b pump thrombosis now s/p heart transplant on 2/23 (CMV D-/R+ and Toxo D-/R+), with post-op course c/b primary graft dysfunction, initially thought to be immune-mediated due to positive B-cell flow (negative CDC cross match) and received plasmapheresis/IVIg x2 with improvement in LV function. Post-transplant course has been complicated by bilateral IJ/subclavian thrombi and he has a persistent small  right sided pleural effusion as well as chronic renal failure, thought to be related to Prograf.  Echo on 4/3 showed evidence of slight worsened LV strain, LVEF of 52%, and slightly diminished right ventricular systolic function.  RHC on 4/4 showed elevated biventricular filling pressures with normal cardiac output. Biopsy showed 1R/1A cellular rejection. C4D continues to be diffusely positive. DSA is unremarkable.  He continued to make progress and was discharged to home on 4/10.  RHC on 4/19 for RHC with biopsy revealing no rejection  and presented on 5/3 for infusion of Rituxan second dose for suspected Graft rejection.  Last RHC 5/9 for biopsy and echo.  Pt presents to clinic for routine lab check up and c/o generalized malaise with frequent urination and thirst. Glucose on BMP noted to be >700 and pt admitted for further workup r/o HHNK. (23 May 2018 16:57)    Pt has h/o RUE DVT diagnosed in feb 2018 and on this admission pt noted to have drop in hgb without clear source of bleeding. His lovenox was held.       Allergies    No Known Allergies    Intolerances    	    MEDICATIONS:  aspirin enteric coated 81 milliGRAM(s) Oral daily  diltiazem    milliGRAM(s) Oral daily    atovaquone Suspension 1500 milliGRAM(s) Oral daily  nystatin    Suspension 700174 Unit(s) Oral every 6 hours  valGANciclovir 450 milliGRAM(s) Oral every 12 hours        famotidine    Tablet 20 milliGRAM(s) Oral two times a day    insulin glargine Injectable (LANTUS) 9 Unit(s) SubCutaneous at bedtime  insulin lispro (HumaLOG) corrective regimen sliding scale   SubCutaneous at bedtime  insulin lispro (HumaLOG) corrective regimen sliding scale   SubCutaneous three times a day before meals  insulin lispro Injectable (HumaLOG) 4 Unit(s) SubCutaneous before lunch  insulin lispro Injectable (HumaLOG) 3 Unit(s) SubCutaneous before dinner  insulin lispro Injectable (HumaLOG) 7 Unit(s) SubCutaneous before breakfast  pravastatin 20 milliGRAM(s) Oral at bedtime  predniSONE   Tablet 7.5 milliGRAM(s) Oral two times a day    magnesium oxide 400 milliGRAM(s) Oral every 12 hours  multivitamin 1 Tablet(s) Oral daily  mupirocin 2% Ointment 1 Application(s) Topical two times a day  mycophenolate mofetil 500 milliGRAM(s) Oral <User Schedule>  silver sulfADIAZINE 1% Cream 1 Application(s) Topical two times a day  sodium bicarbonate 1300 milliGRAM(s) Oral every 8 hours  sodium chloride 0.9% lock flush 3 milliLiter(s) IV Push every 8 hours  tacrolimus 7.5 milliGRAM(s) Oral <User Schedule>      PAST MEDICAL & SURGICAL HISTORY:  DVT of upper extremity (deep vein thrombosis)  Hepatitis C virus  GIB (gastrointestinal bleeding)  Ventricular fibrillation: s/p AICD  PAF (paroxysmal atrial fibrillation): on xarelto  Non-Ischemic Cardiomyopathy  SVT (Supraventricular Tachycardia)  HTN  CHF (Congestive Heart Failure)  H/O heart transplant: 2/2018  LVAD (left ventricular assist device) present  Status post left hip replacement  History of Prior Ablation Treatment: for afib  AICD (Automatic Cardioverter/Defibrillator) Present: [employer] with 1 [employer] lead4/1/09- explanted and replaced with Medtronic 2 leads on 9/2/09      FAMILY HISTORY:  No pertinent family history in first degree relatives      SOCIAL HISTORY:  unchanged    REVIEW OF SYSTEMS:  CONSTITUTIONAL: No fever, weight loss, or fatigue  EYES: No eye pain, visual disturbances, or discharge  ENMT:  No difficulty hearing, tinnitus, vertigo; No sinus or throat pain  NECK: No pain or stiffness  RESPIRATORY: No cough, wheezing, chills or hemoptysis; No Shortness of Breath  CARDIOVASCULAR: No chest pain, palpitations, passing out, dizziness, or leg swelling  GASTROINTESTINAL: No abdominal or epigastric pain. No nausea, vomiting, or hematemesis; No diarrhea or constipation. No melena or hematochezia.  GENITOURINARY: No dysuria, frequency, hematuria, or incontinence  NEUROLOGICAL: No headaches, memory loss, loss of strength, numbness, or tremors  SKIN: No itching, burning, rashes, or lesions   LYMPH Nodes: No enlarged glands  ENDOCRINE: No heat or cold intolerance; No hair loss  MUSCULOSKELETAL: No joint pain or swelling; No muscle, back, or extremity pain  PSYCHIATRIC: No depression, anxiety, mood swings, or difficulty sleeping  HEME/LYMPH: No easy bruising, or bleeding gums  ALLERY AND IMMUNOLOGIC: No hives or eczema	    [ ] All others negative	  [ ] Unable to obtain    PHYSICAL EXAM:  T(C): 36.9 (06-05-18 @ 05:38), Max: 36.9 (06-05-18 @ 05:38)  HR: 96 (06-05-18 @ 05:38) (96 - 100)  BP: 115/83 (06-05-18 @ 05:38) (104/71 - 115/83)  RR: 18 (06-05-18 @ 05:38) (18 - 18)  SpO2: 98% (06-05-18 @ 05:38) (98% - 99%)  Wt(kg): --  I&O's Summary    04 Jun 2018 07:01 - 05 Jun 2018 07:00  --------------------------------------------------------  IN: 520 mL / OUT: 400 mL / NET: 120 mL    05 Jun 2018 07:01 - 05 Jun 2018 12:54  --------------------------------------------------------  IN: 0 mL / OUT: 0 mL / NET: 0 mL        Appearance: Normal, NAD   HEENT:   Normal oral mucosa, PERRL, EOMI	  Lymphatic: No lymphadenopathy  Cardiovascular: Normal S1 S2, No JVD, No murmurs  Respiratory: Lungs clear to auscultation	  Psychiatry: A & O x 3, Mood & affect appropriate  Gastrointestinal:  Soft, Non-tender, + BS	  Skin: + blister with dressing on abdomen	  Neurologic: Non-focal  Extremities: Normal range of motion, No clubbing, cyanosis or edema  Vascular: Peripheral pulses palpable bilaterally      LABS:	 	    CBC Full  -  ( 05 Jun 2018 07:22 )  WBC Count : 2.8 K/uL  Hemoglobin : 8.2 g/dL  Hematocrit : 25.0 %  Platelet Count - Automated : 435 K/uL  Mean Cell Volume : 96.5 fl  Mean Cell Hemoglobin : 31.7 pg  Mean Cell Hemoglobin Concentration : 32.9 gm/dL  Auto Neutrophil # : x  Auto Lymphocyte # : x  Auto Monocyte # : x  Auto Eosinophil # : x  Auto Basophil # : x  Auto Neutrophil % : x  Auto Lymphocyte % : x  Auto Monocyte % : x  Auto Eosinophil % : x  Auto Basophil % : x    06-05    141  |  102  |  19  ----------------------------<  86  4.3   |  28  |  1.28  06-04    138  |  101  |  18  ----------------------------<  100<H>  4.2   |  25  |  1.23    Ca    8.8      05 Jun 2018 07:22  Ca    8.6      04 Jun 2018 07:30    TPro  6.3  /  Alb  3.2<L>  /  TBili  1.0  /  DBili  x   /  AST  19  /  ALT  18  /  AlkPhos  57  06-04 PAGER:  [phone]               [facility] [address]              EMAIL [email or web address]   OFFICE [phone]                              ********VASCULAR MEDICINE CONSULT NOTE********                            Pt seen at bedside pre- RHC/ biopsy. Pt states that RUE swelling and pain is greatly improved. His R digit is healing. He has a blister on his abdomen that is being treated. He denies CP, SOB, palpitations.       HISTORY OF PRESENT ILLNESS:  HPI:  68 year old man with ACC/AHA stage D chronic systolic heart failure due to NICM s/p HM2 LVAD 6/17 c/b pump thrombosis now s/p heart transplant on 2/23 (CMV D-/R+ and Toxo D-/R+), with post-op course c/b primary graft dysfunction, initially thought to be immune-mediated due to positive B-cell flow (negative CDC cross match) and received plasmapheresis/IVIg x2 with improvement in LV function. Post-transplant course has been complicated by bilateral IJ/subclavian thrombi and he has a persistent small  right sided pleural effusion as well as chronic renal failure, thought to be related to Prograf.  Echo on 4/3 showed evidence of slight worsened LV strain, LVEF of 52%, and slightly diminished right ventricular systolic function.  RHC on 4/4 showed elevated biventricular filling pressures with normal cardiac output. Biopsy showed 1R/1A cellular rejection. C4D continues to be diffusely positive. DSA is unremarkable.  He continued to make progress and was discharged to home on 4/10.  RHC on 4/19 for RHC with biopsy revealing no rejection  and presented on 5/3 for infusion of Rituxan second dose for suspected Graft rejection.  Last RHC 5/9 for biopsy and echo.  Pt presents to clinic for routine lab check up and c/o generalized malaise with frequent urination and thirst. Glucose on BMP noted to be >700 and pt admitted for further workup r/o HHNK. (23 May 2018 16:57)    Pt has h/o RUE DVT diagnosed in feb 2018 and on this admission pt noted to have drop in hgb without clear source of bleeding. His lovenox was held.       Allergies    No Known Allergies    Intolerances    	    MEDICATIONS:  aspirin enteric coated 81 milliGRAM(s) Oral daily  diltiazem    milliGRAM(s) Oral daily    atovaquone Suspension 1500 milliGRAM(s) Oral daily  nystatin    Suspension 012710 Unit(s) Oral every 6 hours  valGANciclovir 450 milliGRAM(s) Oral every 12 hours        famotidine    Tablet 20 milliGRAM(s) Oral two times a day    insulin glargine Injectable (LANTUS) 9 Unit(s) SubCutaneous at bedtime  insulin lispro (HumaLOG) corrective regimen sliding scale   SubCutaneous at bedtime  insulin lispro (HumaLOG) corrective regimen sliding scale   SubCutaneous three times a day before meals  insulin lispro Injectable (HumaLOG) 4 Unit(s) SubCutaneous before lunch  insulin lispro Injectable (HumaLOG) 3 Unit(s) SubCutaneous before dinner  insulin lispro Injectable (HumaLOG) 7 Unit(s) SubCutaneous before breakfast  pravastatin 20 milliGRAM(s) Oral at bedtime  predniSONE   Tablet 7.5 milliGRAM(s) Oral two times a day    magnesium oxide 400 milliGRAM(s) Oral every 12 hours  multivitamin 1 Tablet(s) Oral daily  mupirocin 2% Ointment 1 Application(s) Topical two times a day  mycophenolate mofetil 500 milliGRAM(s) Oral <User Schedule>  silver sulfADIAZINE 1% Cream 1 Application(s) Topical two times a day  sodium bicarbonate 1300 milliGRAM(s) Oral every 8 hours  sodium chloride 0.9% lock flush 3 milliLiter(s) IV Push every 8 hours  tacrolimus 7.5 milliGRAM(s) Oral <User Schedule>      PAST MEDICAL & SURGICAL HISTORY:  DVT of upper extremity (deep vein thrombosis)  Hepatitis C virus  GIB (gastrointestinal bleeding)  Ventricular fibrillation: s/p AICD  PAF (paroxysmal atrial fibrillation): on xarelto  Non-Ischemic Cardiomyopathy  SVT (Supraventricular Tachycardia)  HTN  CHF (Congestive Heart Failure)  H/O heart transplant: 2/2018  LVAD (left ventricular assist device) present  Status post left hip replacement  History of Prior Ablation Treatment: for afib  AICD (Automatic Cardioverter/Defibrillator) Present: [employer] with 1 [employer] lead4/1/09- explanted and replaced with Medtronic 2 leads on 9/2/09      FAMILY HISTORY:  No pertinent family history in first degree relatives      SOCIAL HISTORY:  unchanged    REVIEW OF SYSTEMS:  CONSTITUTIONAL: No fever, weight loss, or fatigue  EYES: No eye pain, visual disturbances, or discharge  ENMT:  No difficulty hearing, tinnitus, vertigo; No sinus or throat pain  NECK: No pain or stiffness  RESPIRATORY: No cough, wheezing, chills or hemoptysis; No Shortness of Breath  CARDIOVASCULAR: No chest pain, palpitations, passing out, dizziness, or leg swelling  GASTROINTESTINAL: No abdominal or epigastric pain. No nausea, vomiting, or hematemesis; No diarrhea or constipation. No melena or hematochezia.  GENITOURINARY: No dysuria, frequency, hematuria, or incontinence  NEUROLOGICAL: No headaches, memory loss, loss of strength, numbness, or tremors  SKIN: No itching, burning, rashes, or lesions   LYMPH Nodes: No enlarged glands  ENDOCRINE: No heat or cold intolerance; No hair loss  MUSCULOSKELETAL: No joint pain or swelling; No muscle, back, or extremity pain  PSYCHIATRIC: No depression, anxiety, mood swings, or difficulty sleeping  HEME/LYMPH: No easy bruising, or bleeding gums  ALLERY AND IMMUNOLOGIC: No hives or eczema	    [x ] All others negative	  [ ] Unable to obtain    PHYSICAL EXAM:  T(C): 36.9 (06-05-18 @ 05:38), Max: 36.9 (06-05-18 @ 05:38)  HR: 96 (06-05-18 @ 05:38) (96 - 100)  BP: 115/83 (06-05-18 @ 05:38) (104/71 - 115/83)  RR: 18 (06-05-18 @ 05:38) (18 - 18)  SpO2: 98% (06-05-18 @ 05:38) (98% - 99%)  Wt(kg): --  I&O's Summary    04 Jun 2018 07:01 - 05 Jun 2018 07:00  --------------------------------------------------------  IN: 520 mL / OUT: 400 mL / NET: 120 mL    05 Jun 2018 07:01 - 05 Jun 2018 12:54  --------------------------------------------------------  IN: 0 mL / OUT: 0 mL / NET: 0 mL        Appearance: Normal, NAD   HEENT:   Normal oral mucosa, PERRL, EOMI	  Lymphatic: No lymphadenopathy  Cardiovascular: Normal S1 S2, No JVD, No murmurs  Respiratory: Lungs clear to auscultation	  Psychiatry: A & O x 3, Mood & affect appropriate  Gastrointestinal:  Soft, Non-tender, + BS	  Skin: + blister with dressing on abdomen	  Neurologic: Non-focal  Extremities: Normal range of motion, No clubbing, cyanosis or edema.  The right 3rd digit has a well demarcated eschar.   Vascular: Peripheral pulses palpable bilaterally      LABS:	 	    CBC Full  -  ( 05 Jun 2018 07:22 )  WBC Count : 2.8 K/uL  Hemoglobin : 8.2 g/dL  Hematocrit : 25.0 %  Platelet Count - Automated : 435 K/uL  Mean Cell Volume : 96.5 fl  Mean Cell Hemoglobin : 31.7 pg  Mean Cell Hemoglobin Concentration : 32.9 gm/dL  Auto Neutrophil # : x  Auto Lymphocyte # : x  Auto Monocyte # : x  Auto Eosinophil # : x  Auto Basophil # : x  Auto Neutrophil % : x  Auto Lymphocyte % : x  Auto Monocyte % : x  Auto Eosinophil % : x  Auto Basophil % : x    06-05    141  |  102  |  19  ----------------------------<  86  4.3   |  28  |  1.28  06-04    138  |  101  |  18  ----------------------------<  100<H>  4.2   |  25  |  1.23    Ca    8.8      05 Jun 2018 07:22  Ca    8.6      04 Jun 2018 07:30    TPro  6.3  /  Alb  3.2<L>  /  TBili  1.0  /  DBili  x   /  AST  19  /  ALT  18  /  AlkPhos  57  06-04

## 2018-06-05 NOTE — PROGRESS NOTE ADULT - ASSESSMENT
68 year old man with ACC/AHA stage D chronic systolic heart failure due to NICM s/p HM2 LVAD 6/17 c/b pump thrombosis now s/p heart transplant on 2/23 (CMV D-/R+ and Toxo D-/R+), with post-op course c/b primary graft dysfunction now with improvement in LV function and h/o RUE DVT with decrease in hgb on this admission.     1. RUE DVT - most recent duplex demonstrates resolution of DVT   pt has completed over 3 months of anticoagulation with resolution of initial DVT on recent duplex   given anemia at this time risk of continuing anticoagulation outweigh benefit and pt has completed course of AC for provoked DVT   would cont to observe off AC    Rayna Andersen MD

## 2018-06-05 NOTE — PROGRESS NOTE ADULT - SUBJECTIVE AND OBJECTIVE BOX
Subjective:   Events overnight: none. NPO for cardiac biopsy  CC: "hey I am good"    VITAL SIGNS  T(F): 98  HR: 98  TELE: SR-ST  BP: 109/70  RR: 18  SpO2: 99%    06-03-18 @ 07:01  -  06-04-18 @ 07:00  --------------------------------------------------------  OUT: 1075 mL / NET: -1075 mL    06-04-18 @ 07:01  -  06-05-18 @ 05:06  --------------------------------------------------------  OUT: 400 mL / NET: -400 mL    LAB: 6/5 labwork pending                        8.6    3.0   )-----------( 422      ( 04 Jun 2018 07:30 )             27.5   138  |  101  |  18  ----------------------------<  100<H>  4.2   |  25  |  1.23      CAPILLARY BLOOD GLUCOSE  POCT Blood Glucose.: 105 mg/dL (04 Jun 2018 22:10)  POCT Blood Glucose.: 123 mg/dL (04 Jun 2018 16:37)  POCT Blood Glucose.: 92 mg/dL (04 Jun 2018 11:37)  POCT Blood Glucose.: 170 mg/dL (04 Jun 2018 08:35)  POCT Blood Glucose.: 74 mg/dL (04 Jun 2018 07:41)    DIAGNOSTICS  CT Chest No Cont (06.04.18 @ 16:24)  LUNGS AND LARGE AIRWAYS: Emphysema. 2.5 x 2.5 cm left lower lobe nodular   opacity new since March 20, 2018. There is consolidation within theright   middle lobe with internal air bronchogram demonstrating interval   improvement as manifested by increased aeration. There is linear opacity   within the right lower lobe with overall interval improvement since March 20, 2018.   PLEURA: Small loculated right pleural effusion with interval decrease in   size.  VESSELS: Atherosclerotic aorta. Previously described tubular area of high   density within the left brachiocephalic vein extending into the superior   vena cava is again noted.  HEART: Cardiomegaly. No pericardial effusion. Epicardial pacemaker wires   are present.  MEDIASTINUM AND DARIEN: No lymphadenopathy.  CHEST WALL AND LOWER NECK: Right axillary lymph nodes with the largest   measuring about 1.6 x 2.4 cm containing a fatty hilum suggesting   benignity demonstrating mild interval increase in size.  VISUALIZED UPPER ABDOMEN: Elevation of the right hemidiaphragm. Left   adrenal gland 1.2 cm nodule is unchanged.  BONES: Sternotomy. Degenerative changes of the spine and shoulders.    IMPRESSION:   2.5 x 2.5 cm left lower lobe nodular opacity new since March 20, 2018   likely infectious in etiology. A one-month follow-up CT is recommended to   ensure resolution.  Small loculated right pleural effusion with right lung base   consolidations as described above with overall interval improvement since   March 20, 2018.    MEDICATIONS  aspirin enteric coated 81 milliGRAM(s) Oral daily  atovaquone Suspension 1500 milliGRAM(s) Oral daily  calcium carbonate 1250 mG + Vitamin D (OsCal 500 + D) 2 Tablet(s) Oral two times a day  diltiazem    milliGRAM(s) Oral daily  famotidine    Tablet 20 milliGRAM(s) Oral two times a day  insulin glargine Injectable (LANTUS) 9 Unit(s) SubCutaneous at bedtime  insulin lispro (HumaLOG) corrective regimen sliding scale   SubCutaneous at bedtime  insulin lispro (HumaLOG) corrective regimen sliding scale   SubCutaneous three times a day before meals  insulin lispro Injectable (HumaLOG) 4 Unit(s) SubCutaneous before lunch  insulin lispro Injectable (HumaLOG) 3 Unit(s) SubCutaneous before dinner  insulin lispro Injectable (HumaLOG) 7 Unit(s) SubCutaneous before breakfast  magnesium oxide 400 milliGRAM(s) Oral every 12 hours  multivitamin 1 Tablet(s) Oral daily  mupirocin 2% Ointment 1 Application(s) Topical two times a day  mycophenolate mofetil 500 milliGRAM(s) Oral <User Schedule>  nystatin    Suspension 440792 Unit(s) Oral every 6 hours  pravastatin 20 milliGRAM(s) Oral at bedtime  predniSONE   Tablet 7.5 milliGRAM(s) Oral two times a day  silver sulfADIAZINE 1% Cream 1 Application(s) Topical two times a day  sodium bicarbonate 1300 milliGRAM(s) Oral every 8 hours  sodium chloride 0.9% lock flush 3 milliLiter(s) IV Push every 8 hours  tacrolimus 7.5 milliGRAM(s) Oral <User Schedule>  valGANciclovir 450 milliGRAM(s) Oral every 12 hours      PHYSICAL EXAM  GEN: No apparent distress, examined in bed  PSYCH: Appropriate, communicative, A+Ox3  NEURO: Non-focal  CV: S1 S2 without rub or murmur, no ectopy  PULMONARY:clear  ABDOMEN:  Soft, non-tender, non-distended, bowel sounds active x 4  LBM:   : voiding   VASCULAR: +pp +radial  SKIN: Warm, dry, intact   R finger: wrapped with clean sterile dressing  MUSCULOSKELETAL:  Moves all extremities equally  DISPOSITION: Home when medically stable

## 2018-06-05 NOTE — PROGRESS NOTE ADULT - PROBLEM SELECTOR PROBLEM 2
NORMAN (acute kidney injury)
Transplant, organ
Bradycardia, drug induced
Transplant, organ
Bradycardia, drug induced
H/O heart transplant

## 2018-06-05 NOTE — PROGRESS NOTE ADULT - PROBLEM SELECTOR PLAN 3
UE duplex negative for DVT  LVX held for cath.  Per vascular surgery ok to observe off AC UE duplex negative for DVT  SQ LVX held for R & LHC.  Per vascular cardiology ok to observe off AC- pt has been treated for over 3 months of therapy for DVT

## 2018-06-05 NOTE — PROGRESS NOTE ADULT - PROBLEM SELECTOR PLAN 2
---Routine surveillance RHC/EMB today and  LHC tomorrow to r/o early onset TCAD 2/2 episodes of VT on the weekend.  - EF 48-50% on 5/24 (unchanged from TTE 5/9)  - repeat TTE stable (done for NSVT)  - Will need to hold lovenox 6/4 PM and 6/5 AM for cath  - on ASA 81 mg daily  - Please change lipitor to pravastatin 20 mg at bedtime

## 2018-06-05 NOTE — PROGRESS NOTE ADULT - ASSESSMENT
67 year old man with ACC/AHA stage D chronic systolic heart failure due to NICM s/p HM2 LVAD 6/17 c/b pump thrombosis now s/p heart transplant on 2/23 (CMV D-/R+ and Toxo D-/R+), with post-op course c/b primary graft dysfunction, initially thought to be immune-mediated due to positive B-cell flow (negative CDC cross match) and received plasmapharesis/IVIg x2 with improvement in LV function. His course has been complicated by bilateral IJ/subclavian thrombi (on lovenox). Given persistent C4D staining and decline in LV function in April he was treated with plasmapharesis/IVIg and rituxan (received 2 doses, last 5/9) for suspected AMR (noted to have non-HLA Ab to angiotension II). Has since started Mavyret for HCV treatment. Was admitted for hyperglycemia, hyponatremia and acute kidney injury on 5/23 which has improved with insulin therapy. Noted to have ecchymotic lesions on his arms and a hemorrhagic bullous lesion of the abdomen that were new. Labs also notable for continued leukopenia despite cellcept reduction.     He currently is feeling well. He appears euvolemic on exam without evidence of graft dysfunction. Blood sugars are better controlled on current insulin regimen. Tacro level subtherapeutic today.     # s/p OHT now with graft dysfunction - He had his PP/IVIg #1 on 4/20. Treatment #2 today. Plan for TTE on Monday morning. Would consider continuing IVIg without PP if graft function improves. Will send pre-treatment and post-treatment samples to [place] to look for angiotensin type 1 receptor antibodies that have been associated with non-HLA graft impairment (treatment includes ARB). Discontinue lasix today.    # Immunosuppression prophylaxis:  - Tacro level 8.3; increase tacro to 8mg Q12, goal tacro level 10-12 (resuming Mayvret tonight)  - Continue CellCept at 500 mg po Q12h given leukopenia  - on pred 7.5 q12; taper pending EMB result from today  	  # Antimicrobial prophylaxis:  Intermediate risk CMV - Continue Valcyte to 450 mg po Q12. Routine CMV PCR testing was negative on 5/31.  Intermediate risk Toxo - continue Mepron 1500mg PO daily with food.  PCP - continue Mepron 1500mg PO daily  Thrush - continue Nystatin S/S 5 mL QID until prednisone < 10 mg po BID.    # Additional treatments:  - Citracal + D 2 tabs PO BID  - multivitamin 1 tab daily  - Famotidine 20mg po BID for stress ulcer prophylaxis while on steroids    # Bilateral IJ/Subclavian thrombi -   - Noted to have resolved on repeat venous dopplers 6/4  - Per vascular cardiology, will observe off     # CAV PPx  - Continue ECASA 81mg PO daily  - Continue pravastatin 20mg PO QHS    # Hypertension  - He is currently on cardizem and norvasc for his BP control. Given the possibility that this could be non-HLA rejection, it would be ideal to use losartan instead of norvasc for BP control if his potassium level is under better control.    # ID   - DLES improved and wound vac in place. Dressing changes per protocol of CTU. Off antibiotics.  - Right 3rd digit ulceration improving. Review if silver sulfadiazine dressings still required by plastics.   - HCV viral load by PCR 4/19 was 42,855. Therapy to be initiated by Dr. Thomas (Hepatology) after discharge.  - Transplant ID following        Graft function   - EF 48-50% on 5/24 (unchanged from TTE 5/9)  - repeat TTE stable (done for NSVT)  - RHC with EMB as well as LHC to look for early TCAD (given AMR) scheduled for 6/5  - Will need to hold lovenox 6/4 PM and 6/5 AM for cath    CAV PPx  - on ASA 81 mg daily  - Please change lipitor to pravastatin 20 mg at bedtime    Antimicrobial PPx  - Likely will discontinue nystatin once prednisone is tapered tomorrow.  - continue mepron 1500 mg daily  - on valcyte 450 mg BID    Diabetes  - new onset likely 2/2 steroids/prograf  - appreciate endo assistance; c/w home DM education  - BGs improved on current insulin regimen    Renal dysfunction   - improved  - continue monitoring for now  - on sodium bicarb for hyperkalemia which has resolved    Papular lesions  - Appears to have spontaneously occurred, does not appear to be related to trauma or Lovenox injection, unclear etiology; appreciate derm c/s  - coags wnl and factor Xa level 4 hours post next Lovenox dose were wnl   - culture results negative; HIV negative    B/L UE DVTs  - Repeat upper extremity Dopplers pending  - Follow up with vascular cardiology regarding duration of AC    Anemia - H/H dropping  - CT A/P unrevealing but did note 2.2 cm nodule in lung 67 year old man with ACC/AHA stage D chronic systolic heart failure due to NICM s/p HM2 LVAD 6/17 c/b pump thrombosis now s/p heart transplant on 2/23 (CMV D-/R+ and Toxo D-/R+), with post-op course c/b primary graft dysfunction, initially thought to be immune-mediated due to positive B-cell flow (negative CDC cross match) and received plasmapharesis/IVIg x2 with improvement in LV function. His course has been complicated by bilateral IJ/subclavian thrombi (on lovenox). Given persistent C4D staining and decline in LV function in April he was treated with plasmapharesis/IVIg and rituxan (received 2 doses, last 5/9) for suspected AMR (noted to have non-HLA Ab to angiotension II). Has since started Mavyret for HCV treatment. Was admitted for hyperglycemia, hyponatremia and acute kidney injury on 5/23 which has improved with insulin therapy. Noted to have ecchymotic lesions on his arms and a hemorrhagic bullous lesion of the abdomen that were new. Labs also notable for continued leukopenia despite cellcept reduction.     Underwent RHC with endomyocardial biopsy and coronary angiogram today. Blood sugars are better controlled on current insulin regimen. Tacro level subtherapeutic today.     # s/p OHT  - EF 48-50% on 5/24 (unchanged from TTE 5/9)  - repeat TTE stable (done for NSVT)  - RHC with EMB as well as LHC to look for early TCAD (given AMR) done 6/5    # Immunosuppression prophylaxis:  - Tacro level 8.3; increase tacro to 8mg Q12, goal tacro level 10-12 (resuming Mayvret tonight)  - Continue CellCept at 500 mg po Q12h given leukopenia  - On pred 7.5mg Q12; taper pending EMB result from today  	  # Antimicrobial prophylaxis:  Intermediate risk CMV - Continue Valcyte to 450 mg po Q12. Routine CMV PCR testing was negative on 5/31.  Intermediate risk Toxo - Continue Mepron 1500mg PO daily with food.  PCP - continue Mepron 1500mg PO daily  Thrush - continue Nystatin S/S 5 mL QID until prednisone < 10 mg po BID.    # Additional treatments:  - Citracal + D 2 tabs PO BID  - multivitamin 1 tab daily  - Famotidine 20mg po BID for stress ulcer prophylaxis while on steroids    # Bilateral IJ/Subclavian thrombi -   - Noted to have resolved on repeat venous dopplers 6/4  - Per vascular cardiology, will observe off anticoagulation    # CAV PPx  - Continue EC ASA 81mg PO daily  - Continue pravastatin 20mg PO QHS    # Hypertension  - BP currently well controlled, normotensive  - Continue cardizem 180mg daily.     # ID   - 2.5 x 2.5 LLL nodule noted on chest CT. Fungitell and quant gold to be sent per ID to further eval.  - Right 3rd digit ulceration improving. Silver sulfadine dressings BID  - On Mavyret for hep C. Following with Dr. Thomas. Last received dose on 6/2 as meds taken home by family Saturday.  - Transplant ID following    # Diabetes  - new onset likely 2/2 steroids/prograf  - appreciate endo assistance; c/w home DM education  - BGs improved on current insulin regimen    #Renal dysfunction   - improved  - continue monitoring for now  - on sodium bicarb for hyperkalemia which has resolved    #Papular lesions  - Appears to have spontaneously occurred, does not appear to be related to trauma or Lovenox injection, unclear etiology; appreciate derm input  - coags wnl and factor Xa level 4 hours post next Lovenox dose were wnl   - culture results negative; HIV negative    #Normocytic anemia   - H/H gradually downtrending without overt signs of bleeding 67 year old man with ACC/AHA stage D chronic systolic heart failure due to NICM s/p HM2 LVAD 6/17 c/b pump thrombosis now s/p heart transplant on 2/23 (CMV D-/R+ and Toxo D-/R+), with post-op course c/b primary graft dysfunction, initially thought to be immune-mediated due to positive B-cell flow (negative CDC cross match) and received plasmapharesis/IVIg x2 with improvement in LV function. His course has been complicated by bilateral IJ/subclavian thrombi (on lovenox). Given persistent C4D staining and decline in LV function in April he was treated with plasmapharesis/IVIg and rituxan (received 2 doses, last 5/9) for suspected AMR (noted to have non-HLA Ab to angiotension II). Has since started Mavyret for HCV treatment. Was admitted for hyperglycemia, hyponatremia and acute kidney injury on 5/23 which has improved with insulin therapy. Noted to have ecchymotic lesions on his arms and a hemorrhagic bullous lesion of the abdomen that were new. Labs also notable for continued leukopenia despite cellcept reduction.     Underwent RHC with endomyocardial biopsy and coronary angiogram with IVUS today. Blood sugars are better controlled on current insulin regimen. Tacro level subtherapeutic today.     # s/p OHT  - EF 48-50% on 5/24 (unchanged from TTE 5/9)  - repeat TTE stable (done for NSVT)  - RHC with EMB as well as LHC to look for early TCAD (given AMR) done 6/5    # Immunosuppression prophylaxis:  - Tacro level 8.3; increase tacro to 8mg Q12, goal tacro level 10-12 (resuming Mayvret tonight)  - Continue CellCept at 500 mg po Q12h, but closely follow CBC with diff given leukopenia  - On pred 7.5mg Q12; taper pending EMB result from today  	  # Antimicrobial prophylaxis:  Intermediate risk CMV - Continue Valcyte to 450 mg po Q12. Routine CMV PCR testing was negative on 5/31.  Intermediate risk Toxo - Continue Mepron 1500mg PO daily with food.  PCP - continue Mepron 1500mg PO daily  Thrush - continue Nystatin S/S 5 mL QID, but will review with Dr. Lujan if he needs to continue given current steroid dose.    # Additional treatments:  - Citracal + D 2 tabs PO BID  - multivitamin 1 tab daily  - Famotidine 20mg po BID for stress ulcer prophylaxis while on steroids    # Bilateral IJ/Subclavian thrombi -   - Noted to have resolved on repeat venous dopplers 6/4  - Per vascular cardiology, will observe off anticoagulation    # CAV PPx  - Continue EC ASA 81mg PO daily  - Continue pravastatin 20mg PO QHS    # Hypertension  - BP currently well controlled, normotensive  - Continue cardizem 180mg daily.     # ID   - 2.5 x 2.5 LLL nodule noted on chest CT. Fungitell and quant gold to be sent per ID to further eval.  - Right 3rd digit ulceration improving. Silver sulfadine dressings BID  - On Mavyret for hep C. Following with Dr. Thomas. Last received dose on 6/2 as meds taken home by family Saturday. They will bring back tonight!  - Transplant ID following    # Diabetes  - new onset likely 2/2 steroids/prograf  - appreciate endo assistance; c/w home DM education  - BGs improved on current insulin regimen    #Renal dysfunction   - improved  - continue monitoring for now  - on sodium bicarb for hyperkalemia which has resolved    #Papular lesions  - Appears to have spontaneously occurred, does not appear to be related to trauma or Lovenox injection, unclear etiology; appreciate derm input  - coags wnl and factor Xa level 4 hours post Lovenox dose were wnl   - culture results negative; HIV negative    #Normocytic anemia   - H/H gradually downtrending without overt signs of bleeding. Phlebotomy induced?

## 2018-06-05 NOTE — PROGRESS NOTE ADULT - ATTENDING COMMENTS
He has been treated for over 3 months of therapy for a provoked upper extremity DVT.  At this moment it is reasonable to observe the patient off of full dose anticoagulation  I would however make sure that he is on appropriate phamacologic DVT prophylaxis while inpatient and also don mechanical compression stockings while dvt ppx is being held during biopsies      Thanks    Saurabh Tavarez [id number]  Vascular Cardiology

## 2018-06-05 NOTE — PROGRESS NOTE ADULT - PROBLEM SELECTOR PLAN 6
Antimicrobial PPx  - Thrush: nystatin   - continue mepron 1500 mg daily  - on valcyte 450 mg BID    GI PPX: H2 blocker

## 2018-06-05 NOTE — PROGRESS NOTE ADULT - SUBJECTIVE AND OBJECTIVE BOX
Subjective:    Medications:  aspirin enteric coated 81 milliGRAM(s) Oral daily  atovaquone Suspension 1500 milliGRAM(s) Oral daily  Calcium Citrate Vit D 315mG/250 IU 2 Tablet(s) 2 Tablet(s) Oral <User Schedule>  diltiazem    milliGRAM(s) Oral daily  famotidine    Tablet 20 milliGRAM(s) Oral two times a day  insulin glargine Injectable (LANTUS) 9 Unit(s) SubCutaneous at bedtime  insulin lispro (HumaLOG) corrective regimen sliding scale   SubCutaneous at bedtime  insulin lispro (HumaLOG) corrective regimen sliding scale   SubCutaneous three times a day before meals  insulin lispro Injectable (HumaLOG) 4 Unit(s) SubCutaneous before lunch  insulin lispro Injectable (HumaLOG) 3 Unit(s) SubCutaneous before dinner  insulin lispro Injectable (HumaLOG) 7 Unit(s) SubCutaneous before breakfast  magnesium oxide 400 milliGRAM(s) Oral every 12 hours  mavyret 100mg/40mg  tablet 3 Tablet(s) 3 Tablet(s) Oral daily  multivitamin 1 Tablet(s) Oral daily  mupirocin 2% Ointment 1 Application(s) Topical two times a day  mycophenolate mofetil 500 milliGRAM(s) Oral <User Schedule>  nystatin    Suspension 508726 Unit(s) Oral every 6 hours  pravastatin 20 milliGRAM(s) Oral at bedtime  predniSONE   Tablet 7.5 milliGRAM(s) Oral two times a day  silver sulfADIAZINE 1% Cream 1 Application(s) Topical two times a day  sodium bicarbonate 1300 milliGRAM(s) Oral every 8 hours  sodium chloride 0.9% lock flush 3 milliLiter(s) IV Push every 8 hours  tacrolimus 7.5 milliGRAM(s) Oral <User Schedule>  valGANciclovir 450 milliGRAM(s) Oral every 12 hours      Physical Exam:    Vitals:  Vital Signs Last 24 Hours  T(C): 36.9 (18 @ 05:38), Max: 36.9 (18 @ 05:38)  HR: 96 (18 @ 05:38) (96 - 100)  BP: 115/83 (18 @ 05:38) (104/71 - 115/83)  RR: 18 (18 @ 05:38) (18 - 18)  SpO2: 98% (18 @ 05:38) (98% - 99%)    Weight in k.7 ( @ 09:01)    I&O's Summary    2018 07:  -  2018 07:00  --------------------------------------------------------  IN: 520 mL / OUT: 400 mL / NET: 120 mL    2018 07:  -  2018 13:47  --------------------------------------------------------  IN: 0 mL / OUT: 0 mL / NET: 0 mL      Tele: SR- ST 90-110s    General: No distress. Comfortable.  HEENT: EOM intact.  Neck: Neck supple. JVP not elevated. No masses  Chest: Clear to auscultation bilaterally  CV: Normal S1 and S2. No murmurs, rub, or gallops. Radial pulses normal.  Abdomen: Soft, non-distended, non-tender  Skin: No rashes or skin breakdown  Neurology: Alert and oriented times three. Sensation intact  Psych: Affect normal    Labs:                        8.2    2.8   )-----------( 435      ( 2018 07:22 )             25.0         141  |  102  |  19  ----------------------------<  86  4.3   |  28  |  1.28    Ca    8.8      2018 07:22    TPro  6.3  /  Alb  3.2<L>  /  TBili  1.0  /  DBili  x   /  AST  19  /  ALT  18  /  AlkPhos  57  0604 Subjective: s/p RHC/EMB, LHC and IVUS. No complaints. Seen in cath lab.    Medications:  aspirin enteric coated 81 milliGRAM(s) Oral daily  atovaquone Suspension 1500 milliGRAM(s) Oral daily  Calcium Citrate Vit D 315mG/250 IU 2 Tablet(s) 2 Tablet(s) Oral <User Schedule>  diltiazem    milliGRAM(s) Oral daily  famotidine    Tablet 20 milliGRAM(s) Oral two times a day  insulin glargine Injectable (LANTUS) 9 Unit(s) SubCutaneous at bedtime  insulin lispro (HumaLOG) corrective regimen sliding scale   SubCutaneous at bedtime  insulin lispro (HumaLOG) corrective regimen sliding scale   SubCutaneous three times a day before meals  insulin lispro Injectable (HumaLOG) 4 Unit(s) SubCutaneous before lunch  insulin lispro Injectable (HumaLOG) 3 Unit(s) SubCutaneous before dinner  insulin lispro Injectable (HumaLOG) 7 Unit(s) SubCutaneous before breakfast  magnesium oxide 400 milliGRAM(s) Oral every 12 hours  mavyret 100mg/40mg  tablet 3 Tablet(s) 3 Tablet(s) Oral daily  multivitamin 1 Tablet(s) Oral daily  mupirocin 2% Ointment 1 Application(s) Topical two times a day  mycophenolate mofetil 500 milliGRAM(s) Oral <User Schedule>  nystatin    Suspension [id number] Unit(s) Oral every 6 hours  pravastatin 20 milliGRAM(s) Oral at bedtime  predniSONE   Tablet 7.5 milliGRAM(s) Oral two times a day  silver sulfADIAZINE 1% Cream 1 Application(s) Topical two times a day  sodium bicarbonate 1300 milliGRAM(s) Oral every 8 hours  sodium chloride 0.9% lock flush 3 milliLiter(s) IV Push every 8 hours  tacrolimus 7.5 milliGRAM(s) Oral <User Schedule>  valGANciclovir 450 milliGRAM(s) Oral every 12 hours      Physical Exam:    Vitals:  Vital Signs Last 24 Hours  T(C): 36.9 (18 @ 05:38), Max: 36.9 (18 @ 05:38)  HR: 96 (18 @ 05:38) (96 - 100)  BP: 115/83 (18 @ 05:38) (104/71 - 115/83)  RR: 18 (18 @ 05:38) (18 - 18)  SpO2: 98% (18 @ 05:38) (98% - 99%)    Weight in k.7 ( @ 09:01)    I&O's Summary    2018 07:  -  2018 07:00  --------------------------------------------------------  IN: 520 mL / OUT: 400 mL / NET: 120 mL    2018 07:  -  2018 13:47  --------------------------------------------------------  IN: 0 mL / OUT: 0 mL / NET: 0 mL      Tele: SR- ST 90-110s    General: No distress. Comfortable.  HEENT: EOM intact.  Neck: Neck supple. JVP not elevated. No masses  Chest: Clear to auscultation bilaterally  CV: Normal S1 and S2. No murmurs, rub, or gallops. Radial pulses normal.  Abdomen: Soft, non-distended, non-tender  Skin: No rashes or skin breakdown  Neurology: Alert and oriented times three. Sensation intact  Psych: Affect normal    Labs:                        8.2    2.8   )-----------( 435      ( 2018 07:22 )             25.0         141  |  102  |  19  ----------------------------<  86  4.3   |  28  |  1.28    Ca    8.8      2018 07:22    TPro  6.3  /  Alb  3.2<L>  /  TBili  1.0  /  DBili  x   /  AST  19  /  ALT  18  /  AlkPhos  57  06-04

## 2018-06-06 ENCOUNTER — APPOINTMENT (OUTPATIENT)
Dept: CV DIAGNOSITCS | Facility: HOSPITAL | Age: 68
End: 2018-06-06

## 2018-06-06 ENCOUNTER — OTHER (OUTPATIENT)
Age: 68
End: 2018-06-06

## 2018-06-06 ENCOUNTER — APPOINTMENT (OUTPATIENT)
Dept: CARDIOLOGY | Facility: CLINIC | Age: 68
End: 2018-06-06

## 2018-06-06 VITALS
TEMPERATURE: 98 F | SYSTOLIC BLOOD PRESSURE: 102 MMHG | HEART RATE: 86 BPM | DIASTOLIC BLOOD PRESSURE: 75 MMHG | OXYGEN SATURATION: 98 % | RESPIRATION RATE: 18 BRPM

## 2018-06-06 LAB
ANION GAP SERPL CALC-SCNC: 12 MMOL/L — SIGNIFICANT CHANGE UP (ref 5–17)
BASOPHILS # BLD AUTO: 0 K/UL — SIGNIFICANT CHANGE UP (ref 0–0.2)
BUN SERPL-MCNC: 16 MG/DL — SIGNIFICANT CHANGE UP (ref 7–23)
CALCIUM SERPL-MCNC: 8.7 MG/DL — SIGNIFICANT CHANGE UP (ref 8.4–10.5)
CHLORIDE SERPL-SCNC: 102 MMOL/L — SIGNIFICANT CHANGE UP (ref 96–108)
CO2 SERPL-SCNC: 25 MMOL/L — SIGNIFICANT CHANGE UP (ref 22–31)
CREAT SERPL-MCNC: 1.17 MG/DL — SIGNIFICANT CHANGE UP (ref 0.5–1.3)
EOSINOPHIL # BLD AUTO: 0 K/UL — SIGNIFICANT CHANGE UP (ref 0–0.5)
GLUCOSE BLDC GLUCOMTR-MCNC: 117 MG/DL — HIGH (ref 70–99)
GLUCOSE BLDC GLUCOMTR-MCNC: 143 MG/DL — HIGH (ref 70–99)
GLUCOSE SERPL-MCNC: 114 MG/DL — HIGH (ref 70–99)
HCT VFR BLD CALC: 26.3 % — LOW (ref 39–50)
HGB BLD-MCNC: 8.1 G/DL — LOW (ref 13–17)
LYMPHOCYTES # BLD AUTO: 0.5 K/UL — LOW (ref 1–3.3)
LYMPHOCYTES # BLD AUTO: 16 % — SIGNIFICANT CHANGE UP (ref 13–44)
MCHC RBC-ENTMCNC: 29.4 PG — SIGNIFICANT CHANGE UP (ref 27–34)
MCHC RBC-ENTMCNC: 30.6 GM/DL — LOW (ref 32–36)
MCV RBC AUTO: 95.9 FL — SIGNIFICANT CHANGE UP (ref 80–100)
MONOCYTES # BLD AUTO: 0.1 K/UL — SIGNIFICANT CHANGE UP (ref 0–0.9)
MONOCYTES NFR BLD AUTO: 13 % — SIGNIFICANT CHANGE UP (ref 2–14)
NEUTROPHILS # BLD AUTO: 1.9 K/UL — SIGNIFICANT CHANGE UP (ref 1.8–7.4)
NEUTROPHILS NFR BLD AUTO: 68 % — SIGNIFICANT CHANGE UP (ref 43–77)
PLATELET # BLD AUTO: 426 K/UL — HIGH (ref 150–400)
POTASSIUM SERPL-MCNC: 4.2 MMOL/L — SIGNIFICANT CHANGE UP (ref 3.5–5.3)
POTASSIUM SERPL-SCNC: 4.2 MMOL/L — SIGNIFICANT CHANGE UP (ref 3.5–5.3)
RBC # BLD: 2.75 M/UL — LOW (ref 4.2–5.8)
RBC # FLD: 19.1 % — HIGH (ref 10.3–14.5)
SODIUM SERPL-SCNC: 139 MMOL/L — SIGNIFICANT CHANGE UP (ref 135–145)
TACROLIMUS SERPL-MCNC: 10 NG/ML — SIGNIFICANT CHANGE UP
WBC # BLD: 2.6 K/UL — LOW (ref 3.8–10.5)
WBC # FLD AUTO: 2.6 K/UL — LOW (ref 3.8–10.5)

## 2018-06-06 PROCEDURE — 87521 HEPATITIS C PROBE&RVRS TRNSC: CPT

## 2018-06-06 PROCEDURE — C1753: CPT

## 2018-06-06 PROCEDURE — 83935 ASSAY OF URINE OSMOLALITY: CPT

## 2018-06-06 PROCEDURE — 99285 EMERGENCY DEPT VISIT HI MDM: CPT | Mod: 25

## 2018-06-06 PROCEDURE — 99233 SBSQ HOSP IP/OBS HIGH 50: CPT

## 2018-06-06 PROCEDURE — 84480 ASSAY TRIIODOTHYRONINE (T3): CPT

## 2018-06-06 PROCEDURE — 80048 BASIC METABOLIC PNL TOTAL CA: CPT

## 2018-06-06 PROCEDURE — 82330 ASSAY OF CALCIUM: CPT

## 2018-06-06 PROCEDURE — 87070 CULTURE OTHR SPECIMN AEROBIC: CPT

## 2018-06-06 PROCEDURE — C1817: CPT

## 2018-06-06 PROCEDURE — 76937 US GUIDE VASCULAR ACCESS: CPT

## 2018-06-06 PROCEDURE — 82803 BLOOD GASES ANY COMBINATION: CPT

## 2018-06-06 PROCEDURE — 84443 ASSAY THYROID STIM HORMONE: CPT

## 2018-06-06 PROCEDURE — 88341 IMHCHEM/IMCYTCHM EA ADD ANTB: CPT

## 2018-06-06 PROCEDURE — 87116 MYCOBACTERIA CULTURE: CPT

## 2018-06-06 PROCEDURE — 88313 SPECIAL STAINS GROUP 2: CPT

## 2018-06-06 PROCEDURE — 93505 ENDOMYOCARDIAL BIOPSY: CPT

## 2018-06-06 PROCEDURE — C1769: CPT

## 2018-06-06 PROCEDURE — 71045 X-RAY EXAM CHEST 1 VIEW: CPT

## 2018-06-06 PROCEDURE — 80053 COMPREHEN METABOLIC PANEL: CPT

## 2018-06-06 PROCEDURE — 85730 THROMBOPLASTIN TIME PARTIAL: CPT

## 2018-06-06 PROCEDURE — 82436 ASSAY OF URINE CHLORIDE: CPT

## 2018-06-06 PROCEDURE — 87206 SMEAR FLUORESCENT/ACID STAI: CPT

## 2018-06-06 PROCEDURE — 87040 BLOOD CULTURE FOR BACTERIA: CPT

## 2018-06-06 PROCEDURE — 88342 IMHCHEM/IMCYTCHM 1ST ANTB: CPT

## 2018-06-06 PROCEDURE — 83036 HEMOGLOBIN GLYCOSYLATED A1C: CPT

## 2018-06-06 PROCEDURE — 86644 CMV ANTIBODY: CPT

## 2018-06-06 PROCEDURE — 74176 CT ABD & PELVIS W/O CONTRAST: CPT

## 2018-06-06 PROCEDURE — 83605 ASSAY OF LACTIC ACID: CPT

## 2018-06-06 PROCEDURE — 84132 ASSAY OF SERUM POTASSIUM: CPT

## 2018-06-06 PROCEDURE — 93970 EXTREMITY STUDY: CPT

## 2018-06-06 PROCEDURE — 80197 ASSAY OF TACROLIMUS: CPT

## 2018-06-06 PROCEDURE — 83735 ASSAY OF MAGNESIUM: CPT

## 2018-06-06 PROCEDURE — C1884: CPT

## 2018-06-06 PROCEDURE — C1894: CPT

## 2018-06-06 PROCEDURE — 87102 FUNGUS ISOLATION CULTURE: CPT

## 2018-06-06 PROCEDURE — 36000 PLACE NEEDLE IN VEIN: CPT

## 2018-06-06 PROCEDURE — 87522 HEPATITIS C REVRS TRNSCRPJ: CPT

## 2018-06-06 PROCEDURE — 84300 ASSAY OF URINE SODIUM: CPT

## 2018-06-06 PROCEDURE — 88350 IMFLUOR EA ADDL 1ANTB STN PX: CPT

## 2018-06-06 PROCEDURE — 87186 SC STD MICRODIL/AGAR DIL: CPT

## 2018-06-06 PROCEDURE — 82010 KETONE BODYS QUAN: CPT

## 2018-06-06 PROCEDURE — 87536 HIV-1 QUANT&REVRSE TRNSCRPJ: CPT

## 2018-06-06 PROCEDURE — 85520 HEPARIN ASSAY: CPT

## 2018-06-06 PROCEDURE — 87902 NFCT AGT GNTYP ALYS HEP C: CPT

## 2018-06-06 PROCEDURE — 93306 TTE W/DOPPLER COMPLETE: CPT

## 2018-06-06 PROCEDURE — 82962 GLUCOSE BLOOD TEST: CPT

## 2018-06-06 PROCEDURE — 82947 ASSAY GLUCOSE BLOOD QUANT: CPT

## 2018-06-06 PROCEDURE — 99153 MOD SED SAME PHYS/QHP EA: CPT

## 2018-06-06 PROCEDURE — 85027 COMPLETE CBC AUTOMATED: CPT

## 2018-06-06 PROCEDURE — 71045 X-RAY EXAM CHEST 1 VIEW: CPT | Mod: 26

## 2018-06-06 PROCEDURE — 85014 HEMATOCRIT: CPT

## 2018-06-06 PROCEDURE — 87086 URINE CULTURE/COLONY COUNT: CPT

## 2018-06-06 PROCEDURE — 81003 URINALYSIS AUTO W/O SCOPE: CPT

## 2018-06-06 PROCEDURE — 82570 ASSAY OF URINE CREATININE: CPT

## 2018-06-06 PROCEDURE — 84436 ASSAY OF TOTAL THYROXINE: CPT

## 2018-06-06 PROCEDURE — 84295 ASSAY OF SERUM SODIUM: CPT

## 2018-06-06 PROCEDURE — 85610 PROTHROMBIN TIME: CPT

## 2018-06-06 PROCEDURE — 82435 ASSAY OF BLOOD CHLORIDE: CPT

## 2018-06-06 PROCEDURE — 99152 MOD SED SAME PHYS/QHP 5/>YRS: CPT

## 2018-06-06 PROCEDURE — 71250 CT THORAX DX C-: CPT

## 2018-06-06 PROCEDURE — C1887: CPT

## 2018-06-06 PROCEDURE — 86645 CMV ANTIBODY IGM: CPT

## 2018-06-06 PROCEDURE — 99239 HOSP IP/OBS DSCHRG MGMT >30: CPT

## 2018-06-06 PROCEDURE — 88307 TISSUE EXAM BY PATHOLOGIST: CPT

## 2018-06-06 RX ORDER — INSULIN LISPRO 100/ML
3 VIAL (ML) SUBCUTANEOUS
Qty: 100 | Refills: 0 | OUTPATIENT
Start: 2018-06-06 | End: 2018-07-05

## 2018-06-06 RX ORDER — TACROLIMUS 5 MG/1
8 CAPSULE ORAL
Qty: 480 | Refills: 1 | OUTPATIENT
Start: 2018-06-06 | End: 2018-08-04

## 2018-06-06 RX ORDER — ENOXAPARIN SODIUM 100 MG/ML
7 INJECTION SUBCUTANEOUS
Qty: 100 | Refills: 1 | OUTPATIENT
Start: 2018-06-06 | End: 2018-08-04

## 2018-06-06 RX ADMIN — MYCOPHENOLATE MOFETIL 500 MILLIGRAM(S): 250 CAPSULE ORAL at 07:59

## 2018-06-06 RX ADMIN — Medication 1 APPLICATION(S): at 06:03

## 2018-06-06 RX ADMIN — ATOVAQUONE 1500 MILLIGRAM(S): 750 SUSPENSION ORAL at 12:36

## 2018-06-06 RX ADMIN — Medication 3 UNIT(S): at 12:35

## 2018-06-06 RX ADMIN — SODIUM CHLORIDE 3 MILLILITER(S): 9 INJECTION INTRAMUSCULAR; INTRAVENOUS; SUBCUTANEOUS at 13:19

## 2018-06-06 RX ADMIN — MAGNESIUM OXIDE 400 MG ORAL TABLET 400 MILLIGRAM(S): 241.3 TABLET ORAL at 06:02

## 2018-06-06 RX ADMIN — MUPIROCIN 1 APPLICATION(S): 20 OINTMENT TOPICAL at 06:04

## 2018-06-06 RX ADMIN — SODIUM CHLORIDE 3 MILLILITER(S): 9 INJECTION INTRAMUSCULAR; INTRAVENOUS; SUBCUTANEOUS at 06:07

## 2018-06-06 RX ADMIN — Medication 3 UNIT(S): at 07:56

## 2018-06-06 RX ADMIN — INSULIN GLARGINE 7 UNIT(S): 100 INJECTION, SOLUTION SUBCUTANEOUS at 08:04

## 2018-06-06 RX ADMIN — SODIUM CHLORIDE 3 MILLILITER(S): 9 INJECTION INTRAMUSCULAR; INTRAVENOUS; SUBCUTANEOUS at 01:17

## 2018-06-06 RX ADMIN — VALGANCICLOVIR 450 MILLIGRAM(S): 450 TABLET, FILM COATED ORAL at 06:02

## 2018-06-06 RX ADMIN — Medication 1 TABLET(S): at 12:36

## 2018-06-06 RX ADMIN — Medication 7.5 MILLIGRAM(S): at 06:02

## 2018-06-06 RX ADMIN — Medication 1300 MILLIGRAM(S): at 06:03

## 2018-06-06 RX ADMIN — Medication 1300 MILLIGRAM(S): at 14:31

## 2018-06-06 RX ADMIN — Medication 180 MILLIGRAM(S): at 06:02

## 2018-06-06 RX ADMIN — FAMOTIDINE 20 MILLIGRAM(S): 10 INJECTION INTRAVENOUS at 06:02

## 2018-06-06 RX ADMIN — Medication 81 MILLIGRAM(S): at 12:36

## 2018-06-06 RX ADMIN — TACROLIMUS 8 MILLIGRAM(S): 5 CAPSULE ORAL at 07:59

## 2018-06-06 NOTE — PROGRESS NOTE ADULT - SUBJECTIVE AND OBJECTIVE BOX
Learner: Patient  Barriers: limited reading abilities    Patient post heart transplant was readmitted with hyperglycemia.     Method used: Verbal discussion and written materials.    Medication safety was discussed with the patient: allergies, herbals, adherence, interactions, medication precautions, miss dose instructions, purpose, side effects, signs and symptoms to report, and storage & handling.     Patient was able to repeat and verbalize key points.    Education Summary: Discharge immunosuppressant medications and prophylactic anti-infective agents reviewed with the patient. Outpatient medication schedule was discussed in detail including: medication name, indication, dose, administration times, treatment duration, side effects, drug interactions, and special instructions. Patient questions and concerns were answered and addressed. Patient demonstrated understanding. The patient was able to repeat what insulin products he is supposed to use and the doses of each insulin. In addition, we went over the diabetes education that involved insulin products, how to check blood sugar (what "low" means and what to do), and where to store insulin. The pill box was filled for a week, and the patient will bring medications on Monday to fill another box. Tahira and Nawaf are made aware. Nystatin was stopped.     Time spent discharge education: 60 minutes    MEDICATIONS  (STANDING):  aspirin enteric coated 81 milliGRAM(s) Oral daily  atovaquone Suspension 1500 milliGRAM(s) Oral daily  Calcium Citrate Vit D 315mG/250 IU 2 Tablet(s) 2 Tablet(s) Oral <User Schedule>  diltiazem    milliGRAM(s) Oral daily  famotidine    Tablet 20 milliGRAM(s) Oral two times a day  insulin glargine Injectable (LANTUS) 7 Unit(s) SubCutaneous every morning  insulin lispro (HumaLOG) corrective regimen sliding scale   SubCutaneous at bedtime  insulin lispro (HumaLOG) corrective regimen sliding scale   SubCutaneous three times a day before meals  insulin lispro Injectable (HumaLOG) 3 Unit(s) SubCutaneous before breakfast  insulin lispro Injectable (HumaLOG) 3 Unit(s) SubCutaneous before lunch  insulin lispro Injectable (HumaLOG) 3 Unit(s) SubCutaneous before dinner  magnesium oxide 400 milliGRAM(s) Oral every 12 hours  mavyret 100mg/40mg  tablet 3 Tablet(s) 3 Tablet(s) Oral daily  multivitamin 1 Tablet(s) Oral daily  mupirocin 2% Ointment 1 Application(s) Topical two times a day  mycophenolate mofetil 500 milliGRAM(s) Oral <User Schedule>  pravastatin 20 milliGRAM(s) Oral at bedtime  predniSONE   Tablet 7.5 milliGRAM(s) Oral two times a day  silver sulfADIAZINE 1% Cream 1 Application(s) Topical two times a day  sodium bicarbonate 1300 milliGRAM(s) Oral every 8 hours  sodium chloride 0.9% lock flush 3 milliLiter(s) IV Push every 8 hours  tacrolimus 8 milliGRAM(s) Oral <User Schedule>  valGANciclovir 450 milliGRAM(s) Oral every 12 hours    MEDICATIONS  (PRN):      Cardiac Transplant Medications:  Tacrolimus adjusted to trough (current dose is 8 mG PO twice a day)  Mycophenolate mofetil 500 mg PO twice a day  Prednisone taper (current dose is 7.5 mG PO twice a day)  Atovaquone 1,500 mG (10 mL) PO once a day  Nystatin swish and swallow four times a day - stopped   Valganciclovir 450 mG 1 tablet PO twice a day   Docusate and Senna  Famotidine 20 mG PO twice a day   Calcium Citrate + Vit. D 2 tablets PO twice a day   Pravastatin 20 mG 1 tablet PO at bedtime                         8.1    2.6   )-----------( 426      ( 06 Jun 2018 07:43 )             26.3     06-06    139  |  102  |  16  ----------------------------<  114<H>  4.2   |  25  |  1.17    Ca    8.7      06 Jun 2018 07:43        Tacrolimus (), Serum: 10.0 ng/mL (06-06-18 @ 09:44)  Tacrolimus (), Serum: 8.3 ng/mL (06-05-18 @ 07:55)  Tacrolimus (), Serum: 9.1 ng/mL (06-04-18 @ 09:09)

## 2018-06-06 NOTE — PROGRESS NOTE ADULT - SUBJECTIVE AND OBJECTIVE BOX
Subjective:    Medications:  aspirin enteric coated 81 milliGRAM(s) Oral daily  atovaquone Suspension 1500 milliGRAM(s) Oral daily  Calcium Citrate Vit D 315mG/250 IU 2 Tablet(s) 2 Tablet(s) Oral <User Schedule>  diltiazem    milliGRAM(s) Oral daily  famotidine    Tablet 20 milliGRAM(s) Oral two times a day  insulin glargine Injectable (LANTUS) 7 Unit(s) SubCutaneous every morning  insulin lispro (HumaLOG) corrective regimen sliding scale   SubCutaneous at bedtime  insulin lispro (HumaLOG) corrective regimen sliding scale   SubCutaneous three times a day before meals  insulin lispro Injectable (HumaLOG) 3 Unit(s) SubCutaneous before breakfast  insulin lispro Injectable (HumaLOG) 3 Unit(s) SubCutaneous before lunch  insulin lispro Injectable (HumaLOG) 3 Unit(s) SubCutaneous before dinner  magnesium oxide 400 milliGRAM(s) Oral every 12 hours  mavyret 100mg/40mg  tablet 3 Tablet(s) 3 Tablet(s) Oral daily  multivitamin 1 Tablet(s) Oral daily  mupirocin 2% Ointment 1 Application(s) Topical two times a day  mycophenolate mofetil 500 milliGRAM(s) Oral <User Schedule>  pravastatin 20 milliGRAM(s) Oral at bedtime  predniSONE   Tablet 7.5 milliGRAM(s) Oral two times a day  silver sulfADIAZINE 1% Cream 1 Application(s) Topical two times a day  sodium bicarbonate 1300 milliGRAM(s) Oral every 8 hours  sodium chloride 0.9% lock flush 3 milliLiter(s) IV Push every 8 hours  tacrolimus 8 milliGRAM(s) Oral <User Schedule>  valGANciclovir 450 milliGRAM(s) Oral every 12 hours      Physical Exam:    Vitals:  Vital Signs Last 24 Hours  T(C): 36.7 (06-06-18 @ 04:32), Max: 37.2 (06-05-18 @ 20:29)  HR: 86 (06-06-18 @ 04:32) (86 - 108)  BP: 102/75 (06-06-18 @ 04:32) (102/75 - 115/76)  RR: 18 (06-06-18 @ 04:32) (18 - 18)  SpO2: 98% (06-06-18 @ 04:32) (98% - 100%)        I&O's Summary    05 Jun 2018 07:01  -  06 Jun 2018 07:00  --------------------------------------------------------  IN: 240 mL / OUT: 700 mL / NET: -460 mL    06 Jun 2018 07:01  -  06 Jun 2018 11:18  --------------------------------------------------------  IN: 220 mL / OUT: 200 mL / NET: 20 mL        Tele: [facility]     General: Lying in bed. No distress. Comfortable.  Neck: Neck supple. JVP ~6cm H2O.  Chest: Clear to auscultation bilaterally  CV: Regular. Tachycardic. Normal S1 and S2. II/VI SM LLSB. Radial pulses normal. No LE edema.  Abdomen: Rounded, soft, non-distended, non-tender + BS  Skin: R femoral site soft, no hematoma, dressing C/D/I. Bullae on mid abdomen with dressing intact, no drainage.  Neurology: Alert and oriented times three. Sensation intact  Psych: Affect normal    Labs:                        8.1    2.6   )-----------( [id number]      ( 06 Jun 2018 07:43 )             26.3     06-06    139  |  102  |  16  ----------------------------<  114<H>  4.2   |  25  |  1.17    Ca    8.7      06 Jun 2018 07:43 Subjective:  - Reports feeling well today. Denies SOB, palpitations, lightheadedness, dizziness, orthopnea, PND, fevers, chills, fatigue, nausea and pain at R groin site.     Medications:  aspirin enteric coated 81 milliGRAM(s) Oral daily  atovaquone Suspension 1500 milliGRAM(s) Oral daily  Calcium Citrate Vit D 315mG/250 IU 2 Tablet(s) 2 Tablet(s) Oral <User Schedule>  diltiazem    milliGRAM(s) Oral daily  famotidine    Tablet 20 milliGRAM(s) Oral two times a day  insulin glargine Injectable (LANTUS) 7 Unit(s) SubCutaneous every morning  insulin lispro (HumaLOG) corrective regimen sliding scale   SubCutaneous at bedtime  insulin lispro (HumaLOG) corrective regimen sliding scale   SubCutaneous three times a day before meals  insulin lispro Injectable (HumaLOG) 3 Unit(s) SubCutaneous before breakfast  insulin lispro Injectable (HumaLOG) 3 Unit(s) SubCutaneous before lunch  insulin lispro Injectable (HumaLOG) 3 Unit(s) SubCutaneous before dinner  magnesium oxide 400 milliGRAM(s) Oral every 12 hours  mavyret 100mg/40mg  tablet 3 Tablet(s) 3 Tablet(s) Oral daily  multivitamin 1 Tablet(s) Oral daily  mupirocin 2% Ointment 1 Application(s) Topical two times a day  mycophenolate mofetil 500 milliGRAM(s) Oral <User Schedule>  pravastatin 20 milliGRAM(s) Oral at bedtime  predniSONE   Tablet 7.5 milliGRAM(s) Oral two times a day  silver sulfADIAZINE 1% Cream 1 Application(s) Topical two times a day  sodium bicarbonate 1300 milliGRAM(s) Oral every 8 hours  sodium chloride 0.9% lock flush 3 milliLiter(s) IV Push every 8 hours  tacrolimus 8 milliGRAM(s) Oral <User Schedule>  valGANciclovir 450 milliGRAM(s) Oral every 12 hours    Physical Exam:    Vitals:  Vital Signs Last 24 Hours  T(C): 36.7 (06-06-18 @ 04:32), Max: 37.2 (06-05-18 @ 20:29)  HR: 86 (06-06-18 @ 04:32) (86 - 108)  BP: 102/75 (06-06-18 @ 04:32) (102/75 - 115/76)  RR: 18 (06-06-18 @ 04:32) (18 - 18)  SpO2: 98% (06-06-18 @ 04:32) (98% - 100%)      I&O's Summary    05 Jun 2018 07:01  -  06 Jun 2018 07:00  --------------------------------------------------------  IN: 240 mL / OUT: 700 mL / NET: -460 mL    06 Jun 2018 07:01  -  06 Jun 2018 11:18  --------------------------------------------------------  IN: 220 mL / OUT: 200 mL / NET: 20 mL    Tele: [facility]     General: Lying in bed. No distress. Comfortable.  Neck: Neck supple. JVP ~6cm H2O.  Chest: Clear to auscultation bilaterally  CV: Regular. Tachycardic. Normal S1 and S2. II/VI SM LLSB. Radial pulses normal. No LE edema.  Abdomen: Rounded, soft, non-distended, non-tender + BS  Skin: R femoral site soft, no hematoma, dressing C/D/I. Bullae on mid abdomen with dressing intact, no drainage.  Neurology: Alert and oriented times three. Sensation intact  Psych: Affect normal    Labs:                        8.1    2.6   )-----------( 426      ( 06 Jun 2018 07:43 )             26.3     06-06    139  |  102  |  16  ----------------------------<  114<H>  4.2   |  25  |  1.17    Ca    8.7      06 Jun 2018 07:43    Tacrolimus (), Serum (06.06.18 @ 09:44)    Tacrolimus (), Serum: 10.0

## 2018-06-06 NOTE — PROGRESS NOTE ADULT - ASSESSMENT
67 year old man with ACC/AHA stage D chronic systolic heart failure due to NICM s/p HM2 LVAD 6/17 c/b pump thrombosis now s/p heart transplant on 2/23 (CMV D-/R+ and Toxo D-/R+), with post-op course c/b primary graft dysfunction, initially thought to be immune-mediated due to positive B-cell flow (negative CDC cross match) and received plasmapharesis/IVIg x2 with improvement in LV function. His course has been complicated by bilateral IJ/subclavian thrombi (on lovenox). Given persistent C4D staining and decline in LV function in April he was treated with plasmapharesis/IVIg and rituxan (received 2 doses, last 5/9) for suspected AMR (noted to have non-HLA Ab to angiotension II). Has since started Mavyret for HCV treatment. Was admitted for hyperglycemia, hyponatremia and acute kidney injury on 5/23 which has improved with insulin therapy. Noted to have ecchymotic lesions on his arms and a hemorrhagic bullous lesion of the abdomen that were new. Labs also notable for continued leukopenia despite cellcept reduction.     Underwent RHC with endomyocardial biopsy and coronary angiogram with IVUS yesterday, which showed normal coronaries, filling pressures and output. mLAD to LV fistula was incidentally noted. Blood sugars are well controlled on current insulin regimen. Tacro level pending today.     # s/p OHT  - EF 48-50% on 5/24 (unchanged from TTE 5/9)  - repeat TTE stable (done for NSVT)  - Biopsy results pending.    # Immunosuppression prophylaxis:  - Tacro level 8.3 yesterday, level pending this morning; currently on tacro to 8mg Q12, goal tacro level 10-12 (resumed Mayvret 6/5 PM)  - Continue CellCept at 500 mg po Q12h, but closely follow CBC with diff given leukopenia  - On pred 7.5mg Q12; taper pending EMB result from yesterday  	  # Antimicrobial prophylaxis:  Intermediate risk CMV - Continue Valcyte to 450 mg po Q12. Routine CMV PCR testing was negative on 5/31.  Intermediate risk Toxo - Continue Mepron 1500mg PO daily with food.  PCP - continue Mepron 1500mg PO daily  Thrush - Nystatin discontinue per discussion with ID    # Additional treatments:  - Citracal + D 2 tabs PO BID  - multivitamin 1 tab daily  - Famotidine 20mg po BID for stress ulcer prophylaxis while on steroids    # Bilateral IJ/Subclavian thrombi -   - Noted to have resolved on repeat venous dopplers 6/4  - Per vascular cardiology, will observe off anticoagulation    # CAV PPx  - Continue EC ASA 81mg PO daily  - Continue pravastatin 20mg PO QHS    # Hypertension  - BP currently well controlled, normotensive  - Continue cardizem 180mg daily.     # ID   - 2.5 x 2.5 LLL nodule noted on chest CT. Fungitell and quant gold to be sent per ID to further eval.  - Right 3rd digit ulceration improving. Silver sulfadine dressings BID  - On Mavyret for hep C. Following with Dr. Thomas. Missed 2 doses, resumed 6/5 PM.  - Transplant ID following    # Diabetes  - new onset likely 2/2 steroids/prograf  - appreciate endo assistance; c/w home DM education  - BGs improved on current insulin regimen    #Renal dysfunction   - improved  - continue monitoring for now  - on sodium bicarb for hyperkalemia which has resolved    #Papular lesions  - Appears to have spontaneously occurred, does not appear to be related to trauma or Lovenox injection, unclear etiology; appreciate derm input  - coags wnl and factor Xa level 4 hours post Lovenox dose were wnl   - culture results negative; HIV negative    #Normocytic anemia   - H/H gradually downtrending without overt signs of bleeding. Phlebotomy induced?    #Leukopenia  - Absolute neutrophil count of 1.9 noted this morning  - On reduced dose of CellCept 67 year old man with ACC/AHA stage D chronic systolic heart failure due to NICM s/p HM2 LVAD 6/17 c/b pump thrombosis now s/p heart transplant on 2/23 (CMV D-/R+ and Toxo D-/R+), with post-op course c/b primary graft dysfunction, initially thought to be immune-mediated due to positive B-cell flow (negative CDC cross match) and received plasmapharesis/IVIg x2 with improvement in LV function. His course has been complicated by bilateral IJ/subclavian thrombi (on lovenox). Given persistent C4D staining and decline in LV function in April he was treated with plasmapharesis/IVIg and rituxan (received 2 doses, last 5/9) for suspected AMR (noted to have non-HLA Ab to angiotension II). Has since started Mavyret for HCV treatment. Was admitted for hyperglycemia, hyponatremia and acute kidney injury on 5/23 which has improved with insulin therapy. Noted to have ecchymotic lesions on his arms and a hemorrhagic bullous lesion of the abdomen that were new. Labs also notable for continued leukopenia despite cellcept reduction.     Underwent RHC with endomyocardial biopsy and coronary angiogram with IVUS yesterday, which showed normal coronaries, filling pressures and output. mLAD to LV fistula was incidentally noted. Blood sugars are well controlled on current insulin regimen. Tacro level pending today.     # s/p OHT  - EF 48-50% on 5/24 (unchanged from TTE 5/9)  - repeat TTE stable (done for NSVT)  - Biopsy results from 6/5 pending  - Follow up with Dr. Stahl in clinic on 6/20    # Immunosuppression prophylaxis:  - Tacro level 10 today, will continue with tacro 8mg Q12, goal tacro level 10-12 (resumed Mayvret 6/5 PM)  - Continue CellCept at 500 mg po Q12h, but closely follow CBC with diff given leukopenia  - On pred 7.5mg Q12; taper pending EMB result from yesterday  - Labs including CBC with diff, CMP, Mg, and tacro level to be drawn at outpt lab on Monday 6/11 in morning prior to AM tacro dose  	  # Antimicrobial prophylaxis:  Intermediate risk CMV - Continue Valcyte to 450 mg po Q12. Routine CMV PCR testing was negative on 5/31.  Intermediate risk Toxo - Continue Mepron 1500mg PO daily with food.  PCP - continue Mepron 1500mg PO daily  Thrush - Nystatin discontinued per discussion with ID    # Additional treatments:  - Citracal + D 2 tabs PO BID  - Multivitamin 1 tab daily  - Famotidine 20mg po BID for stress ulcer prophylaxis while on steroids    # Bilateral IJ/Subclavian thrombi -   - Noted to have resolved on repeat venous dopplers 6/4  - Per vascular cardiology, will observe off anticoagulation    # CAV PPx  - Continue EC ASA 81mg PO daily  - Continue pravastatin 20mg PO QHS    # Hypertension  - BP currently well controlled, normotensive  - Continue cardizem 180mg daily.     # ID   - 2.5 x 2.5 LLL nodule noted on chest CT. Fungitell and quant gold to be sent per ID to further eval.  - Right 3rd digit ulceration improving. Silver sulfadine dressings BID  - On Mavyret for hep C. Following with Dr. Thomas. Missed 2 doses, resumed 6/5 PM.  - Transplant ID following    # Diabetes  - new onset likely 2/2 steroids/prograf  - appreciate endo assistance; c/w home DM education  - BGs improved on current insulin regimen    #Renal dysfunction   - improved  - continue monitoring for now  - on sodium bicarb for hyperkalemia which has resolved    #Papular lesions  - Appears to have spontaneously occurred, does not appear to be related to trauma or Lovenox injection, unclear etiology; appreciate derm input  - coags wnl and factor Xa level 4 hours post Lovenox dose were wnl   - culture results negative; HIV negative    #Normocytic anemia   - H/H gradually downtrending without overt signs of bleeding. Phlebotomy induced?  - Will continue to monitor    #Leukopenia  - Absolute neutrophil count of 1.9 noted this morning  - On reduced dose of CellCept  - Will continue to closely monitor 67 year old man with ACC/AHA stage D chronic systolic heart failure due to NICM s/p HM2 LVAD 6/17 c/b pump thrombosis now s/p heart transplant on 2/23 (CMV D-/R+ and Toxo D-/R+), with post-op course c/b primary graft dysfunction, initially thought to be immune-mediated due to positive B-cell flow (negative CDC cross match) and received plasmapharesis/IVIg x2 with improvement in LV function. His course has been complicated by bilateral IJ/subclavian thrombi (on lovenox). Given persistent C4D staining and decline in LV function in April he was treated with plasmapharesis/IVIg and rituxan (received 2 doses, last 5/9) for suspected AMR (noted to have non-HLA Ab to angiotension II). Has since started Mavyret for HCV treatment. Was admitted for hyperglycemia, hyponatremia and acute kidney injury on 5/23 which has improved with insulin therapy. Noted to have ecchymotic lesions on his arms and a hemorrhagic bullous lesion of the abdomen that were new. Labs also notable for continued leukopenia despite cellcept reduction.     Underwent RHC with endomyocardial biopsy and coronary angiogram with IVUS yesterday, which showed normal coronaries, filling pressures and output. mLAD to LV fistula was incidentally noted. Blood sugars are well controlled on current insulin regimen. Tacro level pending today.     # s/p OHT  - EF 48-50% on 5/24 (unchanged from TTE 5/9)  - repeat TTE stable (done for NSVT)  - Biopsy results from 6/5 pending  - Follow up with Dr. Stahl in clinic on 6/20    # Immunosuppression prophylaxis:  - Tacro level 10 today, will continue with tacro 8mg Q12, goal tacro level 10-12 (resumed Mayvret 6/5 PM)  - Continue CellCept at 500 mg po Q12h, but closely follow CBC with diff given leukopenia  - On pred 7.5mg Q12; taper pending EMB result from yesterday  - Labs including CBC with diff, CMP, Mg, and tacro level to be drawn at outpt lab on Monday 6/11 in morning prior to AM tacro dose  	  # Antimicrobial prophylaxis:  Intermediate risk CMV - Continue Valcyte to 450 mg po Q12. Routine CMV PCR testing was negative on 5/31.  Intermediate risk Toxo - Continue Mepron 1500mg PO daily with food.  PCP - continue Mepron 1500mg PO daily  Thrush - Nystatin discontinued per discussion with ID    # Additional treatments:  - Citracal + D 2 tabs PO BID  - Multivitamin 1 tab daily  - Famotidine 20mg po BID for stress ulcer prophylaxis while on steroids    # Bilateral IJ/Subclavian thrombi -   - Noted to have resolved on repeat venous dopplers 6/4  - Per vascular cardiology, will observe off anticoagulation    # CAV PPx  - Continue EC ASA 81mg PO daily  - Continue pravastatin 20mg PO QHS    # Hypertension  - BP currently well controlled, normotensive  - Continue cardizem 180mg daily.     # ID   - 2.5 x 2.5 LLL nodule noted on chest CT. Fungitell and quant gold to be sent per ID to further eval.  - Repeat chest CT in 1 month for follow up.  - Right 3rd digit ulceration improving. Silver sulfadine dressings BID  - On Mavyret for hep C. Following with Dr. Thomas. Missed 2 doses, resumed 6/5 PM.  - Transplant ID following    # Diabetes  - new onset likely 2/2 steroids/prograf  - appreciate endo assistance; c/w home DM education  - BGs improved on current insulin regimen  - Apt scheduled for f/u with endo on 6/11 at 3:30pm    #Renal dysfunction   - improved  - continue monitoring for now  - on sodium bicarb for hyperkalemia which has resolved    #Papular lesions  - Appears to have spontaneously occurred, does not appear to be related to trauma or Lovenox injection, unclear etiology; appreciate derm input  - coags wnl and factor Xa level 4 hours post Lovenox dose were wnl   - culture results negative; HIV negative    #Normocytic anemia   - H/H gradually downtrending without overt signs of bleeding. Phlebotomy induced?  - Will continue to monitor    #Leukopenia  - Absolute neutrophil count of 1.9 noted this morning  - On reduced dose of CellCept  - Will continue to closely monitor 67 year old man with ACC/AHA stage D chronic systolic heart failure due to NICM s/p HM2 LVAD 6/17 c/b pump thrombosis now s/p heart transplant on 2/23 (CMV D-/R+ and Toxo D-/R+), with post-op course c/b primary graft dysfunction, initially thought to be immune-mediated due to positive B-cell flow (negative CDC cross match) and received plasmapharesis/IVIg x2 with improvement in LV function. His course has been complicated by bilateral IJ/subclavian thrombi (on lovenox). Given persistent C4D staining and decline in LV function in April he was treated with plasmapharesis/IVIg and rituxan (received 2 doses, last 5/9) for suspected AMR (noted to have non-HLA Ab to angiotension II). Has since started Mavyret for HCV treatment. Was admitted for hyperglycemia, hyponatremia and acute kidney injury on 5/23 which has improved with insulin therapy. Noted to have ecchymotic lesions on his arms and a hemorrhagic bullous lesion of the abdomen that were new. Labs also notable for continued leukopenia despite cellcept reduction.     Underwent RHC with endomyocardial biopsy and coronary angiogram with IVUS yesterday, which showed normal coronaries, filling pressures and output. mLAD to LV fistula was incidentally noted. Blood sugars are well controlled on current insulin regimen. Tacro level pending today.     # s/p OHT  - EF 48-50% on 5/24 (unchanged from TTE 5/9)  - repeat TTE stable (done for NSVT)  - Biopsy results from 6/5 pending  - Follow up with Dr. Stahl in clinic on 6/21 12pm    # Immunosuppression prophylaxis:  - Tacro level 10 today, will continue with tacro 8mg Q12, goal tacro level 10-12 (resumed Mayvret 6/5 PM)  - Continue CellCept at 500 mg po Q12h, but closely follow CBC with diff given leukopenia  - On pred 7.5mg Q12; taper pending EMB result from yesterday  - Labs including CBC with diff, CMP, Mg, and tacro level to be drawn at outpt lab on Monday 6/11 in morning prior to AM tacro dose  	  # Antimicrobial prophylaxis:  Intermediate risk CMV - Continue Valcyte to 450 mg po Q12. Routine CMV PCR testing was negative on 5/31.  Intermediate risk Toxo - Continue Mepron 1500mg PO daily with food.  PCP - continue Mepron 1500mg PO daily  Thrush - Nystatin discontinued per discussion with ID    # Additional treatments:  - Citracal + D 2 tabs PO BID  - Multivitamin 1 tab daily  - Famotidine 20mg po BID for stress ulcer prophylaxis while on steroids    # Bilateral IJ/Subclavian thrombi -   - Noted to have resolved on repeat venous dopplers 6/4  - Per vascular cardiology, will observe off anticoagulation    # CAV PPx  - Continue EC ASA 81mg PO daily  - Continue pravastatin 20mg PO QHS    # Hypertension  - BP currently well controlled, normotensive  - Continue cardizem 180mg daily.     # ID   - 2.5 x 2.5 LLL nodule noted on chest CT. Fungitell and quant gold to be sent per ID to further eval.  - Repeat chest CT in 1 month for follow up.  - Right 3rd digit ulceration improving. Silver sulfadine dressings BID  - On Mavyret for hep C. Following with Dr. Thomas. Missed 2 doses, resumed 6/5 PM.  - Transplant ID following    # Diabetes  - new onset likely 2/2 steroids/prograf  - appreciate endo assistance; c/w home DM education  - BGs improved on current insulin regimen    #Renal dysfunction   - improved  - continue monitoring for now  - on sodium bicarb for hyperkalemia which has resolved    #Papular lesions  - Appears to have spontaneously occurred, does not appear to be related to trauma or Lovenox injection, unclear etiology; appreciate derm input  - coags wnl and factor Xa level 4 hours post Lovenox dose were wnl   - culture results negative; HIV negative    #Normocytic anemia   - H/H gradually downtrending without overt signs of bleeding. Phlebotomy induced?  - Will continue to monitor    #Leukopenia  - Absolute neutrophil count of 1.9 noted this morning  - On reduced dose of CellCept  - Will continue to closely monitor

## 2018-06-06 NOTE — PROGRESS NOTE ADULT - PROBLEM SELECTOR PROBLEM 1
Diabetes mellitus due to underlying condition with hyperglycemia, without long-term current use of insulin
Hyperglycemia
Diabetes mellitus due to underlying condition with hyperglycemia, without long-term current use of insulin
Hyperglycemia
Hyperglycemia, drug-induced
Diabetes mellitus due to underlying condition with hyperglycemia, without long-term current use of insulin
Diabetes mellitus due to underlying condition with hyperglycemia, without long-term current use of insulin
Hyperglycemia, drug-induced

## 2018-06-06 NOTE — PROGRESS NOTE ADULT - SUBJECTIVE AND OBJECTIVE BOX
Diabetes Follow up note: Saw pt this am prio discahrge.  Interval Hx: 69 y/o M w/h/o HTN/CHF/STV/Tony Fib>AICD/PAF/NICM>LVAD and more recently heart tx c/b DVT on Lovenox. Here with NODAT requiring insulin. Tolerating POs. BG values improve today with insulin dose changes made yesterday. Pt tolerating POs with improve PO intake today. Going home.      Review of Systems:  General: "I'm going home"  GI: Tolerating POs without any N/V/D/ABD PAIN.  CV: No CP/SOB  ENDO: No S&Sx of hypoglycemia  Neuro: No HAs      MEDICATIONS:  atovaquone Suspension 1500 milliGRAM(s) Oral daily  insulin glargine Injectable (LANTUS) 7 Unit(s) SubCutaneous every morning  insulin lispro (HumaLOG) corrective regimen sliding scale   SubCutaneous at bedtime  insulin lispro (HumaLOG) corrective regimen sliding scale   SubCutaneous three times a day before meals  insulin lispro Injectable (HumaLOG) 3 Unit(s) SubCutaneous before breakfast  insulin lispro Injectable (HumaLOG) 3 Unit(s) SubCutaneous before lunch  insulin lispro Injectable (HumaLOG) 3 Unit(s) SubCutaneous before dinner  pravastatin 20 milliGRAM(s) Oral at bedtime  predniSONE   Tablet 7.5 milliGRAM(s) Oral two times a day  tacrolimus 8 milliGRAM(s) Oral <User Schedule>  valGANciclovir 450 milliGRAM(s) Oral every 12 hours    MEDICATIONS  (PRN):    Allergies    No Known Allergies        PE:  General: Male lying in bed in NAD.   Vital Signs Last 24 Hrs  T(C): 36.7 (06-06-18 @ 04:32), Max: 37.2 (06-05-18 @ 20:29)  T(F): 98 (06-06-18 @ 04:32), Max: 98.9 (06-05-18 @ 20:29)  HR: 86 (06-06-18 @ 04:32) (86 - 108)  BP: 102/75 (06-06-18 @ 04:32) (102/75 - 115/76)  BP(mean): 89 (06-05-18 @ 18:15) (89 - 89)  RR: 18 (06-06-18 @ 04:32) (18 - 18)  SpO2: 98% (06-06-18 @ 04:32) (98% - 100%)  Chest: Midsternal incision healed  Abd: Soft, NT, ND   Extremities: Warm, no edema noted in all 4 exts  Neuro: A&O X3    LABS:  POCT Blood Glucose.: 143 mg/dL (06-06-18 @ 12:21)  POCT Blood Glucose.: 117 mg/dL (06-06-18 @ 07:41)  POCT Blood Glucose.: 118 mg/dL (06-05-18 @ 21:29)  POCT Blood Glucose.: 98 mg/dL (06-05-18 @ 15:40)  POCT Blood Glucose.: 89 mg/dL (06-05-18 @ 07:34)  POCT Blood Glucose.: 105 mg/dL (06-04-18 @ 22:10)  POCT Blood Glucose.: 123 mg/dL (06-04-18 @ 16:37)                          8.1    2.6   )-----------( 426      ( 06 Jun 2018 07:43 )             26.3   06-06    139  |  102  |  16  ----------------------------<  114<H>  4.2   |  25  |  1.17    Ca    8.7      06 Jun 2018 07:43                Thyroid Function Tests:  05-24 @ 05:48 TSH 0.86 FreeT4 -- T3 61 Anti TPO -- Anti Thyroglobulin Ab -- TSI --      Hemoglobin A1C, Whole Blood: 10.4 % <H> [4.0 - 5.6] (05-23-18 @ 20:06)  Hemoglobin A1C, Whole Blood: 5.3 % [4.0 - 5.6] (03-18-18 @ 11:20)

## 2018-06-06 NOTE — PROGRESS NOTE ADULT - PROVIDER SPECIALTY LIST ADULT
CT Surgery
Dermatology
Endocrinology
Infectious Disease
Pharmacy
Psychiatry
Transplant Medicine
Vascular Cardiology
Endocrinology
Infectious Disease
Transplant Medicine
Infectious Disease
Infectious Disease
Endocrinology
Psychiatry
CT Surgery

## 2018-06-06 NOTE — PROGRESS NOTE ADULT - ASSESSMENT
69 y/o M w/h/o HTN/CHF/STV/Tony Fib>AICD/PAF/NICM>LVAD and more recently heart tx c/b DVT on Lovenox. Here with NODAT. Tolerating POs. with improved glycemic control on new insulin doses. Going home today.  Spoke to pt about new insulin regimen and also reinforced use of insulin pen. However, pt need to be prompted on use of pen but is able to physically do it with assistance. Per pt he will be assisted by his brother and friend "Tahira". Brother received education during this stay as per nursing staff. Tried to contact brother Nawaf Barahona at > left message in voice mail.  Gave written info on how to take insulin at home as well as this provider's contact info in case of any questions. Pt verbalized understanding. Spoke to pt's RN to reinforce use of insulin pen once more prior discharge. 67 y/o M w/h/o HTN/CHF/STV/Tony Fib>AICD/PAF/NICM>LVAD and more recently heart tx c/b DVT on Lovenox. Here with NODAT. Tolerating POs. with improved glycemic control on new insulin doses. Going home today.  Spoke to pt about new insulin regimen and also reinforced use of insulin pen. However, pt need to be prompted on use of pen but is able to physically do it with assistance. Per pt he will be assisted by his brother and friend "Tahira". Brother received education during this stay as per nursing staff. Tried to contact brother Nawaf Barahona at > left message in voice mail.  Gave written info on how to take insulin at home as well as this provider's contact info in case of any questions. Pt verbalized understanding. Spoke to pt's RN to reinforce use of insulin pen once more prior discharge.   Spent over 40 minutes reviewing DM discharge plan and insulin pen teaching.

## 2018-06-06 NOTE — PROGRESS NOTE ADULT - PROBLEM SELECTOR PLAN 1
DISCHARGE:  -test BG AC/HS  -Continue Lantus 7 units QAM and Humalog 3 ac meals. No humalog correction scales needed.  -Will need home care to reinforce education. Has home care set up, brother to assists with insulin injections  -Has f/u apt at endo clinic on 6/21/18 at 10:40am . [address] 4rd floor [facility].  -Also has f/u apts on 6/11 with GIOVANI and 7/30 with Dr Antonio endocrinologist at [address] [place] [address] [address]. Phone   Pt has all relevant glucometer and insulin supplies at home  -Plan discussed with pt/team. Has this provider contact info> Pt to contact if BG >200s a6itlsr of BG <70.  Contact info: [phone] (24/7). pager [phone]

## 2018-06-11 ENCOUNTER — APPOINTMENT (OUTPATIENT)
Dept: ENDOCRINOLOGY | Facility: CLINIC | Age: 68
End: 2018-06-11

## 2018-06-12 ENCOUNTER — APPOINTMENT (OUTPATIENT)
Dept: ENDOCRINOLOGY | Facility: CLINIC | Age: 68
End: 2018-06-12
Payer: MEDICARE

## 2018-06-12 LAB — GLUCOSE BLDC GLUCOMTR-MCNC: 131

## 2018-06-12 PROCEDURE — 82962 GLUCOSE BLOOD TEST: CPT | Mod: PD

## 2018-06-12 PROCEDURE — 95251 CONT GLUC MNTR ANALYSIS I&R: CPT

## 2018-06-12 PROCEDURE — G0108 DIAB MANAGE TRN  PER INDIV: CPT | Mod: PD

## 2018-06-13 ENCOUNTER — INPATIENT (INPATIENT)
Facility: HOSPITAL | Age: 68
LOS: 12 days | Discharge: HOME HEALTH | DRG: 871 | End: 2018-06-26
Attending: THORACIC SURGERY (CARDIOTHORACIC VASCULAR SURGERY) | Admitting: THORACIC SURGERY (CARDIOTHORACIC VASCULAR SURGERY)
Payer: MEDICARE

## 2018-06-13 VITALS
HEART RATE: 130 BPM | RESPIRATION RATE: 22 BRPM | TEMPERATURE: 102 F | DIASTOLIC BLOOD PRESSURE: 75 MMHG | SYSTOLIC BLOOD PRESSURE: 108 MMHG | OXYGEN SATURATION: 97 %

## 2018-06-13 DIAGNOSIS — Z94.1 HEART TRANSPLANT STATUS: ICD-10-CM

## 2018-06-13 DIAGNOSIS — D70.9 NEUTROPENIA, UNSPECIFIED: ICD-10-CM

## 2018-06-13 DIAGNOSIS — D64.9 ANEMIA, UNSPECIFIED: ICD-10-CM

## 2018-06-13 DIAGNOSIS — Z95.811 PRESENCE OF HEART ASSIST DEVICE: Chronic | ICD-10-CM

## 2018-06-13 DIAGNOSIS — Z79.2 LONG TERM (CURRENT) USE OF ANTIBIOTICS: ICD-10-CM

## 2018-06-13 DIAGNOSIS — Z94.1 HEART TRANSPLANT STATUS: Chronic | ICD-10-CM

## 2018-06-13 DIAGNOSIS — B19.20 UNSPECIFIED VIRAL HEPATITIS C WITHOUT HEPATIC COMA: ICD-10-CM

## 2018-06-13 DIAGNOSIS — R65.10 SYSTEMIC INFLAMMATORY RESPONSE SYNDROME (SIRS) OF NON-INFECTIOUS ORIGIN WITHOUT ACUTE ORGAN DYSFUNCTION: ICD-10-CM

## 2018-06-13 LAB
ALBUMIN SERPL ELPH-MCNC: 3.2 G/DL — LOW (ref 3.3–5)
ALP SERPL-CCNC: 53 U/L — SIGNIFICANT CHANGE UP (ref 40–120)
ALT FLD-CCNC: 14 U/L — SIGNIFICANT CHANGE UP (ref 10–45)
ANION GAP SERPL CALC-SCNC: 17 MMOL/L — SIGNIFICANT CHANGE UP (ref 5–17)
APTT BLD: 25.9 SEC — LOW (ref 27.5–37.4)
AST SERPL-CCNC: 24 U/L — SIGNIFICANT CHANGE UP (ref 10–40)
BASE EXCESS BLDV CALC-SCNC: -2.1 MMOL/L — LOW (ref -2–2)
BASOPHILS # BLD AUTO: 0 K/UL — SIGNIFICANT CHANGE UP (ref 0–0.2)
BILIRUB SERPL-MCNC: 1.4 MG/DL — HIGH (ref 0.2–1.2)
BLD GP AB SCN SERPL QL: NEGATIVE — SIGNIFICANT CHANGE UP
BUN SERPL-MCNC: 30 MG/DL — HIGH (ref 7–23)
CA-I SERPL-SCNC: 1.15 MMOL/L — SIGNIFICANT CHANGE UP (ref 1.12–1.3)
CALCIUM SERPL-MCNC: 8.6 MG/DL — SIGNIFICANT CHANGE UP (ref 8.4–10.5)
CHLORIDE BLDV-SCNC: 104 MMOL/L — SIGNIFICANT CHANGE UP (ref 96–108)
CHLORIDE SERPL-SCNC: 100 MMOL/L — SIGNIFICANT CHANGE UP (ref 96–108)
CO2 BLDV-SCNC: 22 MMOL/L — SIGNIFICANT CHANGE UP (ref 22–30)
CO2 SERPL-SCNC: 19 MMOL/L — LOW (ref 22–31)
CREAT SERPL-MCNC: 1.39 MG/DL — HIGH (ref 0.5–1.3)
EOSINOPHIL # BLD AUTO: 0 K/UL — SIGNIFICANT CHANGE UP (ref 0–0.5)
GAS PNL BLDA: SIGNIFICANT CHANGE UP
GAS PNL BLDV: 133 MMOL/L — LOW (ref 136–145)
GAS PNL BLDV: SIGNIFICANT CHANGE UP
GAS PNL BLDV: SIGNIFICANT CHANGE UP
GLUCOSE BLDC GLUCOMTR-MCNC: 102 MG/DL — HIGH (ref 70–99)
GLUCOSE BLDC GLUCOMTR-MCNC: 107 MG/DL — HIGH (ref 70–99)
GLUCOSE BLDV-MCNC: 99 MG/DL — SIGNIFICANT CHANGE UP (ref 70–99)
GLUCOSE SERPL-MCNC: 93 MG/DL — SIGNIFICANT CHANGE UP (ref 70–99)
GRAM STN FLD: SIGNIFICANT CHANGE UP
HCO3 BLDV-SCNC: 21 MMOL/L — SIGNIFICANT CHANGE UP (ref 21–29)
HCT VFR BLD CALC: 20.5 % — CRITICAL LOW (ref 39–50)
HCT VFR BLD CALC: 22.7 % — LOW (ref 39–50)
HCT VFR BLD CALC: 24.1 % — LOW (ref 39–50)
HCT VFR BLDA CALC: 23 % — LOW (ref 39–50)
HGB BLD CALC-MCNC: 7.3 G/DL — LOW (ref 13–17)
HGB BLD-MCNC: 6.8 G/DL — CRITICAL LOW (ref 13–17)
HGB BLD-MCNC: 6.9 G/DL — CRITICAL LOW (ref 13–17)
HGB BLD-MCNC: 7.7 G/DL — LOW (ref 13–17)
HOROWITZ INDEX BLDV+IHG-RTO: SIGNIFICANT CHANGE UP
INR BLD: 1.3 RATIO — HIGH (ref 0.88–1.16)
INR BLD: 1.3 RATIO — HIGH (ref 0.88–1.16)
LACTATE BLDV-MCNC: 1.6 MMOL/L — SIGNIFICANT CHANGE UP (ref 0.7–2)
LEGIONELLA AG UR QL: NEGATIVE — SIGNIFICANT CHANGE UP
LYMPHOCYTES # BLD AUTO: 0.1 K/UL — LOW (ref 1–3.3)
LYMPHOCYTES # BLD AUTO: 14 % — SIGNIFICANT CHANGE UP (ref 13–44)
MCHC RBC-ENTMCNC: 27.7 PG — SIGNIFICANT CHANGE UP (ref 27–34)
MCHC RBC-ENTMCNC: 30.4 GM/DL — LOW (ref 32–36)
MCHC RBC-ENTMCNC: 30.4 PG — SIGNIFICANT CHANGE UP (ref 27–34)
MCHC RBC-ENTMCNC: 31.8 PG — SIGNIFICANT CHANGE UP (ref 27–34)
MCHC RBC-ENTMCNC: 31.9 GM/DL — LOW (ref 32–36)
MCHC RBC-ENTMCNC: 33.3 GM/DL — SIGNIFICANT CHANGE UP (ref 32–36)
MCV RBC AUTO: 91.2 FL — SIGNIFICANT CHANGE UP (ref 80–100)
MCV RBC AUTO: 95.2 FL — SIGNIFICANT CHANGE UP (ref 80–100)
MCV RBC AUTO: 95.7 FL — SIGNIFICANT CHANGE UP (ref 80–100)
MONOCYTES # BLD AUTO: 0.1 K/UL — SIGNIFICANT CHANGE UP (ref 0–0.9)
MONOCYTES NFR BLD AUTO: 16 % — HIGH (ref 2–14)
MYELOCYTES NFR BLD: 2 % — HIGH (ref 0–0)
NEUTROPHILS # BLD AUTO: 0.5 K/UL — LOW (ref 1.8–7.4)
NEUTROPHILS NFR BLD AUTO: 66 % — SIGNIFICANT CHANGE UP (ref 43–77)
NEUTS BAND # BLD: 2 % — SIGNIFICANT CHANGE UP (ref 0–8)
NRBC # BLD: 2 /100 — HIGH (ref 0–0)
NT-PROBNP SERPL-SCNC: 2146 PG/ML — HIGH (ref 0–300)
PCO2 BLDV: 33 MMHG — LOW (ref 35–50)
PH BLDV: 7.43 — SIGNIFICANT CHANGE UP (ref 7.35–7.45)
PLAT MORPH BLD: NORMAL — SIGNIFICANT CHANGE UP
PLATELET # BLD AUTO: 252 K/UL — SIGNIFICANT CHANGE UP (ref 150–400)
PLATELET # BLD AUTO: 335 K/UL — SIGNIFICANT CHANGE UP (ref 150–400)
PLATELET # BLD AUTO: 352 K/UL — SIGNIFICANT CHANGE UP (ref 150–400)
PO2 BLDV: 58 MMHG — HIGH (ref 25–45)
POTASSIUM BLDV-SCNC: 5.1 MMOL/L — SIGNIFICANT CHANGE UP (ref 3.5–5.3)
POTASSIUM SERPL-MCNC: 5.2 MMOL/L — SIGNIFICANT CHANGE UP (ref 3.5–5.3)
POTASSIUM SERPL-SCNC: 5.2 MMOL/L — SIGNIFICANT CHANGE UP (ref 3.5–5.3)
PROT SERPL-MCNC: 6.6 G/DL — SIGNIFICANT CHANGE UP (ref 6–8.3)
PROTHROM AB SERPL-ACNC: 14.1 SEC — HIGH (ref 9.8–12.7)
PROTHROM AB SERPL-ACNC: 14.1 SEC — HIGH (ref 9.8–12.7)
RAPID RVP RESULT: SIGNIFICANT CHANGE UP
RBC # BLD: 2.14 M/UL — LOW (ref 4.2–5.8)
RBC # BLD: 2.49 M/UL — LOW (ref 4.2–5.8)
RBC # BLD: 2.53 M/UL — LOW (ref 4.2–5.8)
RBC # FLD: 19.1 % — HIGH (ref 10.3–14.5)
RBC # FLD: 19.3 % — HIGH (ref 10.3–14.5)
RBC # FLD: 20.4 % — HIGH (ref 10.3–14.5)
RBC BLD AUTO: SIGNIFICANT CHANGE UP
RH IG SCN BLD-IMP: POSITIVE — SIGNIFICANT CHANGE UP
SAO2 % BLDV: 88 % — SIGNIFICANT CHANGE UP (ref 67–88)
SODIUM SERPL-SCNC: 136 MMOL/L — SIGNIFICANT CHANGE UP (ref 135–145)
SPECIMEN SOURCE: SIGNIFICANT CHANGE UP
TACROLIMUS SERPL-MCNC: 6.6 NG/ML — SIGNIFICANT CHANGE UP
WBC # BLD: 0.7 K/UL — CRITICAL LOW (ref 3.8–10.5)
WBC # BLD: 0.9 K/UL — CRITICAL LOW (ref 3.8–10.5)
WBC # BLD: 1.04 K/UL — CRITICAL LOW (ref 3.8–10.5)
WBC # FLD AUTO: 0.7 K/UL — CRITICAL LOW (ref 3.8–10.5)
WBC # FLD AUTO: 0.9 K/UL — CRITICAL LOW (ref 3.8–10.5)
WBC # FLD AUTO: 1.04 K/UL — CRITICAL LOW (ref 3.8–10.5)

## 2018-06-13 PROCEDURE — 93306 TTE W/DOPPLER COMPLETE: CPT | Mod: 26

## 2018-06-13 PROCEDURE — 99223 1ST HOSP IP/OBS HIGH 75: CPT | Mod: GC

## 2018-06-13 PROCEDURE — 71045 X-RAY EXAM CHEST 1 VIEW: CPT | Mod: 26

## 2018-06-13 PROCEDURE — 99285 EMERGENCY DEPT VISIT HI MDM: CPT | Mod: GC

## 2018-06-13 PROCEDURE — 0399T: CPT

## 2018-06-13 PROCEDURE — 36620 INSERTION CATHETER ARTERY: CPT

## 2018-06-13 RX ORDER — FAMOTIDINE 10 MG/ML
20 INJECTION INTRAVENOUS DAILY
Qty: 0 | Refills: 0 | Status: DISCONTINUED | OUTPATIENT
Start: 2018-06-13 | End: 2018-06-26

## 2018-06-13 RX ORDER — MAGNESIUM OXIDE 400 MG ORAL TABLET 241.3 MG
400 TABLET ORAL EVERY 12 HOURS
Qty: 0 | Refills: 0 | Status: DISCONTINUED | OUTPATIENT
Start: 2018-06-13 | End: 2018-06-26

## 2018-06-13 RX ORDER — SODIUM CHLORIDE 9 MG/ML
3 INJECTION INTRAMUSCULAR; INTRAVENOUS; SUBCUTANEOUS EVERY 8 HOURS
Qty: 0 | Refills: 0 | Status: DISCONTINUED | OUTPATIENT
Start: 2018-06-13 | End: 2018-06-26

## 2018-06-13 RX ORDER — PIPERACILLIN AND TAZOBACTAM 4; .5 G/20ML; G/20ML
3.38 INJECTION, POWDER, LYOPHILIZED, FOR SOLUTION INTRAVENOUS ONCE
Qty: 0 | Refills: 0 | Status: COMPLETED | OUTPATIENT
Start: 2018-06-13 | End: 2018-06-13

## 2018-06-13 RX ORDER — PIPERACILLIN AND TAZOBACTAM 4; .5 G/20ML; G/20ML
3.38 INJECTION, POWDER, LYOPHILIZED, FOR SOLUTION INTRAVENOUS EVERY 8 HOURS
Qty: 0 | Refills: 0 | Status: DISCONTINUED | OUTPATIENT
Start: 2018-06-13 | End: 2018-06-17

## 2018-06-13 RX ORDER — ASPIRIN/CALCIUM CARB/MAGNESIUM 324 MG
81 TABLET ORAL DAILY
Qty: 0 | Refills: 0 | Status: DISCONTINUED | OUTPATIENT
Start: 2018-06-13 | End: 2018-06-26

## 2018-06-13 RX ORDER — SODIUM BICARBONATE 1 MEQ/ML
650 SYRINGE (ML) INTRAVENOUS EVERY 8 HOURS
Qty: 0 | Refills: 0 | Status: DISCONTINUED | OUTPATIENT
Start: 2018-06-13 | End: 2018-06-26

## 2018-06-13 RX ORDER — ATOVAQUONE 750 MG/5ML
1500 SUSPENSION ORAL DAILY
Qty: 0 | Refills: 0 | Status: DISCONTINUED | OUTPATIENT
Start: 2018-06-13 | End: 2018-06-26

## 2018-06-13 RX ORDER — ACETAMINOPHEN 500 MG
1000 TABLET ORAL ONCE
Qty: 0 | Refills: 0 | Status: COMPLETED | OUTPATIENT
Start: 2018-06-13 | End: 2018-06-13

## 2018-06-13 RX ORDER — VANCOMYCIN HCL 1 G
1000 VIAL (EA) INTRAVENOUS ONCE
Qty: 0 | Refills: 0 | Status: COMPLETED | OUTPATIENT
Start: 2018-06-13 | End: 2018-06-13

## 2018-06-13 RX ORDER — FILGRASTIM 480MCG/1.6
480 VIAL (ML) INJECTION DAILY
Qty: 0 | Refills: 0 | Status: COMPLETED | OUTPATIENT
Start: 2018-06-13 | End: 2018-06-16

## 2018-06-13 RX ORDER — TACROLIMUS 5 MG/1
8 CAPSULE ORAL
Qty: 0 | Refills: 0 | Status: DISCONTINUED | OUTPATIENT
Start: 2018-06-13 | End: 2018-06-15

## 2018-06-13 RX ORDER — FILGRASTIM 480MCG/1.6
480 VIAL (ML) INJECTION DAILY
Qty: 0 | Refills: 0 | Status: DISCONTINUED | OUTPATIENT
Start: 2018-06-13 | End: 2018-06-13

## 2018-06-13 RX ORDER — ATORVASTATIN CALCIUM 80 MG/1
10 TABLET, FILM COATED ORAL AT BEDTIME
Qty: 0 | Refills: 0 | Status: DISCONTINUED | OUTPATIENT
Start: 2018-06-13 | End: 2018-06-14

## 2018-06-13 RX ORDER — SODIUM CHLORIDE 9 MG/ML
1000 INJECTION INTRAMUSCULAR; INTRAVENOUS; SUBCUTANEOUS ONCE
Qty: 0 | Refills: 0 | Status: COMPLETED | OUTPATIENT
Start: 2018-06-13 | End: 2018-06-13

## 2018-06-13 RX ORDER — INSULIN LISPRO 100/ML
3 VIAL (ML) SUBCUTANEOUS
Qty: 0 | Refills: 0 | Status: DISCONTINUED | OUTPATIENT
Start: 2018-06-13 | End: 2018-06-26

## 2018-06-13 RX ORDER — INSULIN GLARGINE 100 [IU]/ML
7 INJECTION, SOLUTION SUBCUTANEOUS AT BEDTIME
Qty: 0 | Refills: 0 | Status: DISCONTINUED | OUTPATIENT
Start: 2018-06-13 | End: 2018-06-26

## 2018-06-13 RX ADMIN — Medication 1 TABLET(S): at 18:38

## 2018-06-13 RX ADMIN — Medication 480 MICROGRAM(S): at 16:51

## 2018-06-13 RX ADMIN — FAMOTIDINE 20 MILLIGRAM(S): 10 INJECTION INTRAVENOUS at 18:37

## 2018-06-13 RX ADMIN — SODIUM CHLORIDE 1000 MILLILITER(S): 9 INJECTION INTRAMUSCULAR; INTRAVENOUS; SUBCUTANEOUS at 11:00

## 2018-06-13 RX ADMIN — Medication 1 TABLET(S): at 18:37

## 2018-06-13 RX ADMIN — PIPERACILLIN AND TAZOBACTAM 200 GRAM(S): 4; .5 INJECTION, POWDER, LYOPHILIZED, FOR SOLUTION INTRAVENOUS at 11:05

## 2018-06-13 RX ADMIN — Medication 5 MILLIGRAM(S): at 18:37

## 2018-06-13 RX ADMIN — Medication 250 MILLIGRAM(S): at 11:58

## 2018-06-13 RX ADMIN — MAGNESIUM OXIDE 400 MG ORAL TABLET 400 MILLIGRAM(S): 241.3 TABLET ORAL at 18:37

## 2018-06-13 RX ADMIN — ATORVASTATIN CALCIUM 10 MILLIGRAM(S): 80 TABLET, FILM COATED ORAL at 22:21

## 2018-06-13 RX ADMIN — SODIUM CHLORIDE 3 MILLILITER(S): 9 INJECTION INTRAMUSCULAR; INTRAVENOUS; SUBCUTANEOUS at 22:22

## 2018-06-13 RX ADMIN — Medication 400 MILLIGRAM(S): at 11:15

## 2018-06-13 RX ADMIN — INSULIN GLARGINE 7 UNIT(S): 100 INJECTION, SOLUTION SUBCUTANEOUS at 22:48

## 2018-06-13 RX ADMIN — Medication 81 MILLIGRAM(S): at 18:37

## 2018-06-13 RX ADMIN — PIPERACILLIN AND TAZOBACTAM 25 GRAM(S): 4; .5 INJECTION, POWDER, LYOPHILIZED, FOR SOLUTION INTRAVENOUS at 22:21

## 2018-06-13 RX ADMIN — TACROLIMUS 8 MILLIGRAM(S): 5 CAPSULE ORAL at 20:07

## 2018-06-13 RX ADMIN — Medication 650 MILLIGRAM(S): at 22:21

## 2018-06-13 NOTE — ED PROVIDER NOTE - PSH
AICD (Automatic Cardioverter/Defibrillator) Present  [employer] with 1 [employer] lead4/1/09- explanted and replaced with [employer] 2 leads on 9/2/09  H/O heart transplant  2/2018  History of Prior Ablation Treatment  for afib  LVAD (left ventricular assist device) present    Status post left hip replacement

## 2018-06-13 NOTE — PATIENT PROFILE ADULT. - PSH
AICD (Automatic Cardioverter/Defibrillator) Present  [employer] with 1 [employer] lead4/1/09- explanted and replaced with [employer] 2 leads on 9/2/09  H/O heart transplant  2/2018  History of Prior Ablation Treatment  for afib  LVAD (left ventricular assist device) present    Status post left hip replacement

## 2018-06-13 NOTE — CONSULT NOTE ADULT - SUBJECTIVE AND OBJECTIVE BOX
HPI:      PAST MEDICAL & SURGICAL HISTORY:  DVT of upper extremity (deep vein thrombosis)  Hepatitis C virus  GIB (gastrointestinal bleeding)  Ventricular fibrillation: s/p AICD  PAF (paroxysmal atrial fibrillation): on xarelto  Non-Ischemic Cardiomyopathy  SVT (Supraventricular Tachycardia)  HTN  CHF (Congestive Heart Failure)  H/O heart transplant: 2018  LVAD (left ventricular assist device) present  Status post left hip replacement  History of Prior Ablation Treatment: for afib  AICD (Automatic Cardioverter/Defibrillator) Present: [employer] with 1 [employer] lead09- explanted and replaced with Medtronic 2 leads on 09      Allergies    No Known Allergies    Intolerances        ANTIMICROBIALS:      OTHER MEDS:      SOCIAL HISTORY:    Marital Status:  (   )    (  ) Single    (   )    (  )   Occupation:   Lives with: (  ) alone  (  ) children   (  ) spouse   (  ) parents  (  ) other    Substance Use (street drugs): (  ) never used  (  ) other:  Tobacco Usage:  (  ) never smoked   (   ) former smoker   (   ) current smoker  (     ) pack year  (        ) last cigarette date  Alcohol Usage: Social EtOH    FAMILY HISTORY:  No pertinent family history in first degree relatives      ROS:  Unobtainable because:   All other systems negative     Constitutional: no fever, no chills, no weight loss, no night sweats  HEENT:  no vision changes, no sore throat, no rhinorrhea  Cardiovascular:  no chest pain, no palpitation  Respiratory:  no SOB, no cough  GI:  no abd pain, no vomiting, no diarrhea  urinary: no dysuria, no hematuria, no flank pain  musculoskeletal:  no joint pain, no joint swelling  skin:  no rash  neurology:  no headache, no seizure, no change in mental status    Physical Exam:    General:    NAD, non toxic, A&O x3  HEENT:   no oropharyngeal lesions, no LAD, neck supple  Cardiovascular:    regular S1,S2, no murmur  Respiratory:   clear b/l, no wheezing  abd:   soft, BS +, not tender, no hepatosplenomegaly  :     no CVAT, no spurapubic tenderness, no diaz  Musculoskeletal : no joint swelling, no edema  Skin:    no rash  Neurologic:     no focal deficits      Drug Dosing Weight  Height (cm): 172.72 (23 May 2018 18:20)  Weight (kg): 79.4 (2018 11:26)  BMI (kg/m2): 26.6 (2018 11:26)  BSA (m2): 1.93 (2018 11:26)    Vital Signs Last 24 Hrs  T(F): 101.8 (18 @ 10:28), Max: 101.8 (18 @ 10:28)    Vital Signs Last 24 Hrs  HR: 130 (18 @ 10:28) (130 - 130)  BP: 108/75 (18 @ 10:28) (108/75 - 108/75)  RR: 22 (18 @ 10:28)  SpO2: 97% (18 @ 10:28) (97% - 97%)  Wt(kg): --                          7.7    0.7   )-----------( 352      ( 2018 11:21 )             24.1           136  |  100  |  30<H>  ----------------------------<  93  5.2   |  19<L>  |  1.39<H>    Ca    8.6      2018 11:21    TPro  6.6  /  Alb  3.2<L>  /  TBili  1.4<H>  /  DBili  x   /  AST  24  /  ALT  14  /  AlkPhos  53        Urinalysis Basic - ( 2018 11:23 )    Color: Yellow / Appearance: Clear / S.013 / pH: x  Gluc: x / Ketone: Negative  / Bili: Negative / Urobili: Negative   Blood: x / Protein: 30 mg/dL / Nitrite: Negative   Leuk Esterase: Negative / RBC: 2-5 /HPF / WBC 2-5 /HPF   Sq Epi: x / Non Sq Epi: x / Bacteria: x        MICROBIOLOGY:  v  Skin Skin, abdomen  18   No growth at 48 hours  --  --      .Blood Blood-Peripheral  18   No growth at 5 days.  --  --      .Urine Clean Catch (Midstream)  18   10,000 - 49,000 CFU/mL Klebsiella oxytoca  --  Klebsiella oxytoca      .Blood Blood-Peripheral  18   No growth at 5 days.  --  --        HIV-1 RNA Quantitative, Viral Load Log: NOT DET. lg /mL (18 @ 19:10)  CMV IgG Antibody: 5.40 U/mL (18 @ 17:51)  CMV IgG Antibody: >10.00 U/mL (18 @ 23:45)  HIV-1 RNA Quantitative, Viral Load Log: NOT DET. lg /mL (18 @ 19:25)            RADIOLOGY: HPI:  68M s/p cardiac transplant 18 for NICM, on Tacrolimus, Cellcept and Prednisone and HCV acquired from donor on [address], was recently admitted -18 for medication-induced hyperglycemia, admitted 18 for sepsis.   [place] "unwell" yesterday during the day and this morning developed chills and fevers. He's been coughing more in the past five days with increased white sputum but not much. Some dyspnea, no chest pain. No sick contacts or traveling. No sore throat or runny nose. No diarrhea or dysuria.   As per EMS, was found to have bedbugs. He was not aware of this. He lives in [place] with his family. Has not had skin rashes, itching or pain and neither has any of his family members.   Feels better since being in the hospital. No other complaints.     PAST MEDICAL & SURGICAL HISTORY:  DVT of upper extremity (deep vein thrombosis)  Hepatitis C virus  GIB (gastrointestinal bleeding)  Ventricular fibrillation: s/p AICD  PAF (paroxysmal atrial fibrillation): on xarelto  Non-Ischemic Cardiomyopathy  SVT (Supraventricular Tachycardia)  HTN  CHF (Congestive Heart Failure)  H/O heart transplant: 2018  LVAD (left ventricular assist device) present  Status post left hip replacement  History of Prior Ablation Treatment: for afib  AICD (Automatic Cardioverter/Defibrillator) Present: [employer] with 1 [employer] [place] lead09- explanted and replaced with Medtronic 2 leads on 09    Allergies    No Known Allergies    Intolerances    ANTIMICROBIALS:      OTHER MEDS:      SOCIAL HISTORY: Former smoker. Lives with family in [place].     FAMILY HISTORY:  No pertinent family history in first degree relatives      ROS:  All other systems negative   Constitutional: fevers, chills malaise- all improved now   HEENT:  no sinus pain, no sore throat, no rhinorrhea  Cardiovascular:  no chest pain, no palpitation  Respiratory:  as per HPI   GI:  no abd pain, no vomiting, no diarrhea  urinary: no dysuria, no hematuria, no flank pain  musculoskeletal:  no joint pain, no joint swelling  skin:  no rash  neurology:  no headache   heme: no bleeding     Physical Exam:  General:  NAD, non toxic, A&O   HEENT: upper dentures. no oropharyngeal lesions, no LAD, neck supple  Cardiovascular:  slightly tachycardic, regular rhythm   Respiratory: nonlabored, decreased breath sounds with some crackles in RLL, otherwise clear, no wheezing   abd: soft, BS +, not tender, no hepatosplenomegaly  :  no spurapubic tenderness, no diaz  Musculoskeletal: no joint swelling, no edema  Skin:    no rash. healed surgical scars over chest and abdomen   Neurologic:     no focal deficits  Psych: normal affect     Drug Dosing Weight  Height (cm): 172.72 (23 May 2018 18:20)  Weight (kg): 79.4 (2018 11:26)  BMI (kg/m2): 26.6 (2018 11:26)  BSA (m2): 1.93 (2018 11:26)    Vital Signs Last 24 Hrs  T(F): 101.8 (18 @ 10:28), Max: 101.8 (18 @ 10:28)    Vital Signs Last 24 Hrs  HR: 130 (18 @ 10:28) (130 - 130)  BP: 108/75 (18 @ 10:28) (108/75 - 108/75)  RR: 22 (18 @ 10:28)  SpO2: 97% (18 @ 10:28) (97% - 97%)  Wt(kg): --                          7.7    0.7   )-----------( 352      ( 2018 11:21 )             24.1           136  |  100  |  30<H>  ----------------------------<  93  5.2   |  19<L>  |  1.39<H>    Ca    8.6      2018 11:21    TPro  6.6  /  Alb  3.2<L>  /  TBili  1.4<H>  /  DBili  x   /  AST  24  /  ALT  14  /  AlkPhos  53        Urinalysis Basic - ( 2018 11:23 )    Color: Yellow / Appearance: Clear / S.013 / pH: x  Gluc: x / Ketone: Negative  / Bili: Negative / Urobili: Negative   Blood: x / Protein: 30 mg/dL / Nitrite: Negative   Leuk Esterase: Negative / RBC: 2-5 /HPF / WBC 2-5 /HPF   Sq Epi: x / Non Sq Epi: x / Bacteria: x        MICROBIOLOGY:  Blood cultures in lab   Urine cultures in lab     RADIOLOGY:  Xray Chest 1 View- PORTABLE-Urgent (18 @ 12:40)   Poor inspiratory effort effort. Heart is normal in size. Platelike   atelectasis right lower lobe. The left lung appears to be clear. Status   post sternotomy.

## 2018-06-13 NOTE — ED ADULT NURSE REASSESSMENT NOTE - NS ED NURSE REASSESS COMMENT FT1
ECHO at bedside. Pt admitted to CCU stepdown, receive admitting bed assignment on 2 [address]. Report given to Cande METCALF. Cande METCALF aware of contact precautions for r/o bed bugs and hypotension (Maribeth GARCIA aware)

## 2018-06-13 NOTE — CONSULT NOTE ADULT - PROBLEM SELECTOR RECOMMENDATION 2
-would hold further IVF  -cont empyric abx  -check cmv pcr  -blood, urine, sputum cultures; viral panel  -cxr -Will monitor closely with arterial line.  -Given 1L of fluid in ED. Will give further volume as necessary.

## 2018-06-13 NOTE — CONSULT NOTE ADULT - SUBJECTIVE AND OBJECTIVE BOX
Patient seen and evaluated @ er cc26  Chief Complaint: shaking    HPI:  67 year old man with ACC/AHA stage D chronic systolic heart failure due to NICM s/p HM2 LVAD 6/17 c/b pump thrombosis s/p heart transplant on 2/23 (CMV D-/R+ and Toxo D-/R+), with post-op course c/b primary graft dysfunction, initially thought to be immune-mediated due to positive B-cell flow (negative CDC cross match) and received plasmapharesis/IVIg x2 with improvement in LV function; course was also complicated by bilateral IJ/subclavian thrombi (on lovenox). Given persistent C4D staining and decline in LV function in April he was treated with plasmapharesis/IVIg and rituxan (received 2 doses, last 5/9) for suspected AMR (noted to have non-HLA Ab to angiotension II). Has since started Mavyret for HCV treatment.     5/23/18 admitted for hyperglycemia, hyponatremia and acute kidney injury    States last two days has been increasingly shaking. Associated w breathing more labored in am associated w cough productive clear sputum, although improved w rest, and darker brown urine. Yesterday, developed frontal headache and describing that he "can't see clearly." He has been compliant with his medications and diet. Today his home nurse found him shaking. He was trying to give himself his injections, but was unable to do so due to shaking. He denied loc, orthopnea, extremity swelling. Home nurse called EMS and he was brought by ambulance to [facility].     In ER, sinus tach 130s improved to 110s after 1L IVF and vanc/zosyn infusions. /75, satting high 90s on 2L NC, w resp rate 22. Rectal temp recorded 38.8C.     PMH:   DVT of upper extremity (deep vein thrombosis)  Hepatitis C virus  GIB (gastrointestinal bleeding)  H/O prior ablation treatment  Ventricular fibrillation  PAF (paroxysmal atrial fibrillation)  Non-Ischemic Cardiomyopathy  SVT (Supraventricular Tachycardia)  HTN  CHF (Congestive Heart Failure)    PSH:   H/O heart transplant  LVAD (left ventricular assist device) present  Status post left hip replacement  Hypertension  Hypertension  History of Prior Ablation Treatment  AICD (Automatic Cardioverter/Defibrillator) Present    Medications:     Allergies:  No Known Allergies    FAMILY HISTORY:  No pertinent family history in first degree relatives    Social History:  Smoking:  Alcohol:  Drugs:    Review of Systems:  Constitutional: [ ] Fever [ ] Chills [ ] Fatigue [ ] Weight change   HEENT: [ ] Blurred vision [ ] Eye Pain [ ] Headache [ ] Runny nose [ ] Sore Throat   Respiratory: [ ] Cough [ ] Wheezing [ ] Shortness of breath  Cardiovascular: [ ] Chest Pain [ ] Palpitations [ ] LOBATO [ ] PND [ ] Orthopnea  Gastrointestinal: [ ] Abdominal Pain [ ] Diarrhea [ ] Constipation [ ] Hemorrhoids [ ] Nausea [ ] Vomiting  Genitourinary: [ ] Nocturia [ ] Dysuria [ ] Incontinence  Extremities: [ ] Swelling [ ] Joint Pain  Neurologic: [ ] Focal deficit [ ] Paresthesias [ ] Syncope  Lymphatic: [ ] Swelling [ ] Lymphadenopathy   Skin: [ ] Rash [ ] Ecchymoses [ ] Wounds [ ] Lesions  Psychiatry: [ ] Depression [ ] Suicidal/Homicidal Ideation [ ] Anxiety [ ] Sleep Disturbances  [ ] 10 point review of systems is otherwise negative except as mentioned above            [ ]Unable to obtain    Physical Exam:  T(C): 38.8 (06-13-18 @ 10:28), Max: 38.8 (06-13-18 @ 10:28)  HR: 130 (06-13-18 @ 10:28) (130 - 130)  BP: 108/75 (06-13-18 @ 10:28) (108/75 - 108/75)  RR: 22 (06-13-18 @ 10:28) (22 - 22)  SpO2: 97% (06-13-18 @ 10:28) (97% - 97%)  Wt(kg): --    Daily     Daily     Appearance: [ ] Normal [ ] NAD  Eyes: [ ] PERRL [ ] EOMI  HENT: [ ] Normal oral muscosa [ ]NC/AT  Cardiovascular: [ ] S1 [ ] S2 [ ] RRR [ ] No m/r/g [ ]No edema [ ] no JVP  Procedural Access Site: [ ] No hematoma [ ] Non-tender to palpation [ ] 2+ pulse [ ] No bruit [ ] No Ecchymosis  Respiratory: [ ] Clear to auscultation bilaterally  Gastrointestinal: [ ] Soft [ ] Non-tender [ ] Non-distended [ ] BS+  Musculoskeletal: [ ] No clubbing [ ] No joint deformity   Neurologic: [ ] Non-focal  Lymphatic: [ ] No lymphadenopathy  Psychiatry: [ ] AAOx3 [ ] Mood & affect appropriate  Skin: [ ] No rashes [ ] No ecchymoses [ ] No cyanosis    Labs:                        7.7    0.7   )-----------( 352      ( 13 Jun 2018 11:21 )             24.1     06-13    136  |  100  |  30<H>  ----------------------------<  93  5.2   |  19<L>  |  1.39<H>    Ca    8.6      13 Jun 2018 11:21    TPro  6.6  /  Alb  3.2<L>  /  TBili  1.4<H>  /  DBili  x   /  AST  24  /  ALT  14  /  AlkPhos  53  06-13    PT/INR - ( 13 Jun 2018 11:21 )   PT: 14.1 sec;   INR: 1.30 ratio         PTT - ( 13 Jun 2018 11:21 )  PTT:25.9 sec    < from: Cardiac Cath Lab - Adult (06.05.18 @ 13:47) >  Pressures:  -- Aortic Pressure (S/D/M): 99/68/82  Pressures:  -- Left Ventricle (s/edp): 97/9/--  Pressures:  -- Pulmonary Artery (S/D/M): 33/14/22  Pressures:  -- Pulmonary Capillary Wedge: 9/8/7  Pressures:  -- Right Atrium (a/v/M): 6/8/6  Pressures:  -- Right Ventricle (s/edp): 31/6/--  Outputs:  -- OUTPUTS: CO by Marino: 6.15  Outputs:  -- OUTPUTS: Marino cardiac index: 3.44    fistula from mid-distal LAD to the LV  < end of copied text > Patient seen and evaluated @ er cc26  Chief Complaint: shaking    HPI:  67 year old man with ACC/AHA stage D chronic systolic heart failure due to NICM s/p HM2 LVAD 6/17 c/b pump thrombosis s/p heart transplant on 2/23 (CMV D-/R+ and Toxo D-/R+), with post-op course c/b primary graft dysfunction, initially thought to be immune-mediated due to positive B-cell flow (negative CDC cross match) and received plasmapharesis/IVIg x2 with improvement in LV function; course was also complicated by bilateral IJ/subclavian thrombi (on lovenox). Given persistent C4D staining and decline in LV function in April he was treated with plasmapharesis/IVIg and rituxan (received 2 doses, last 5/9) for suspected AMR (noted to have non-HLA Ab to angiotension II). Has since started Mavyret for HCV treatment.     5/23/18 admitted for hyperglycemia, hyponatremia and acute kidney injury    States last two days has been increasingly shaking. Associated w breathing more labored in am associated w cough productive clear sputum, although improved w rest, and darker yellow urine. Yesterday, developed frontal headache and describing that he "can't see clearly." He has been compliant with his medications and diet. Today his home nurse found him shaking. He was trying to give himself his injections, but was unable to do so due to shaking. He denied loc, orthopnea, extremity swelling. Home nurse called EMS and he was brought by ambulance to [facility].     In ER, sinus tach 130s improved to 110s after 1L IVF and vanc/zosyn infusions. /75, satting high 90s on 2L NC, w resp rate 22. Rectal temp recorded 38.8C.     PMH:   DVT of upper extremity (deep vein thrombosis)  Hepatitis C virus  GIB (gastrointestinal bleeding)  H/O prior ablation treatment  Ventricular fibrillation  PAF (paroxysmal atrial fibrillation)  Non-Ischemic Cardiomyopathy  SVT (Supraventricular Tachycardia)  HTN  CHF (Congestive Heart Failure)    PSH:   H/O heart transplant  LVAD (left ventricular assist device) present  Status post left hip replacement  Hypertension  Hypertension  History of Prior Ablation Treatment  AICD (Automatic Cardioverter/Defibrillator) Present    Medications from last admit:   aspirin enteric coated 81 milliGRAM(s) Oral daily  atovaquone Suspension 1500 milliGRAM(s) Oral daily  Calcium Citrate Vit D 315mG/250 IU 2 Tablet(s) 2 Tablet(s) Oral <User Schedule>  diltiazem    milliGRAM(s) Oral daily  famotidine    Tablet 20 milliGRAM(s) Oral two times a day  insulin glargine Injectable (LANTUS) 7 Unit(s) SubCutaneous every morning  insulin lispro (HumaLOG) corrective regimen sliding scale   SubCutaneous at bedtime  insulin lispro (HumaLOG) corrective regimen sliding scale   SubCutaneous three times a day before meals  insulin lispro Injectable (HumaLOG) 3 Unit(s) SubCutaneous before breakfast  insulin lispro Injectable (HumaLOG) 3 Unit(s) SubCutaneous before lunch  insulin lispro Injectable (HumaLOG) 3 Unit(s) SubCutaneous before dinner  magnesium oxide 400 milliGRAM(s) Oral every 12 hours  mavyret 100mg/40mg  tablet 3 Tablet(s) 3 Tablet(s) Oral daily  multivitamin 1 Tablet(s) Oral daily  mupirocin 2% Ointment 1 Application(s) Topical two times a day  mycophenolate mofetil 500 milliGRAM(s) Oral q12h <User Schedule>  pravastatin 20 milliGRAM(s) Oral at bedtime  predniSONE   Tablet 7.5 milliGRAM(s) Oral two times a day  silver sulfADIAZINE 1% Cream 1 Application(s) Topical two times a day  sodium bicarbonate 1300 milliGRAM(s) Oral every 8 hours  sodium chloride 0.9% lock flush 3 milliLiter(s) IV Push every 8 hours  tacrolimus 8 milliGRAM(s) Oral q12h <User Schedule>  valGANciclovir 450 milliGRAM(s) Oral every 12 hours      Allergies:  No Known Allergies    FAMILY HISTORY:  No pertinent family history in first degree relatives    Social History:  Smoking: former, quit 30 yrs ago  Alcohol: denies  Drugs: denies    Review of Systems:  Constitutional: shaking chills, denies overt fever  HEENT: inability to "see clear"  Respiratory: labored breathing in am above baseline assoc w cough w clear sputum  Cardiovascular: denies cp, orthopnea, palpitations, loc  Gastrointestinal: denies n/v, abd pain, diarrhea  Genitourinary: darker yellow urine  Extremities: denies swelling, pain  Neurologic: denies focal deficits, loc  Skin: denies rashes, echymosis. Abd scars healing well. Not bleeding per patient.  Psychiatry: denies SI/HI    Physical Exam:  T(C): 38.8 (06-13-18 @ 10:28), Max: 38.8 (06-13-18 @ 10:28)  HR: 130 (06-13-18 @ 10:28) (130 - 130)  BP: 108/75 (06-13-18 @ 10:28) (108/75 - 108/75)  RR: 22 (06-13-18 @ 10:28) (22 - 22)  SpO2: 97% (06-13-18 @ 10:28) (97% - 97%)  Wt(kg): --    Daily     Daily     Appearance: [x ] Normal [ x] NAD  Eyes: [x ] PERRL [x ] EOMI  HENT: [x ] Normal oral muscosa [x ]NC/AT  Cardiovascular: [x ] S1 [ x] S2 [x ] RRR [x ] No m/r/g [ x]No edema, JVD near clavicle visible w mild liver pressure  Respiratory: [x ] Clear to auscultation bilaterally  Gastrointestinal: [x ] Soft [x ] Non-tender x ] Non-distended [x ] BS+; negative Stubbs  Musculoskeletal: [x ] No clubbing [x ] No joint deformity   Neurologic: [x ] Non-focal  Lymphatic: x[ ] No lymphadenopathy  Psychiatry: [x] AAOx3 [x ] Mood & affect appropriate  Skin: [x ] No rashes    Labs:                        7.7    0.7   )-----------( 352      ( 13 Jun 2018 11:21 )             24.1     06-13    136  |  100  |  30<H>  ----------------------------<  93  5.2   |  19<L>  |  1.39<H>    Ca    8.6      13 Jun 2018 11:21    TPro  6.6  /  Alb  3.2<L>  /  TBili  1.4<H>  /  DBili  x   /  AST  24  /  ALT  14  /  AlkPhos  53  06-13    PT/INR - ( 13 Jun 2018 11:21 )   PT: 14.1 sec;   INR: 1.30 ratio         PTT - ( 13 Jun 2018 11:21 )  PTT:25.9 sec    < from: Cardiac Cath Lab - Adult (06.05.18 @ 13:47) >  Pressures:  -- Aortic Pressure (S/D/M): 99/68/82  Pressures:  -- Left Ventricle (s/edp): 97/9/--  Pressures:  -- Pulmonary Artery (S/D/M): 33/14/22  Pressures:  -- Pulmonary Capillary Wedge: 9/8/7  Pressures:  -- Right Atrium (a/v/M): 6/8/6  Pressures:  -- Right Ventricle (s/edp): 31/6/--  Outputs:  -- OUTPUTS: CO by Marino: 6.15  Outputs:  -- OUTPUTS: Marino cardiac index: 3.44    fistula from mid-distal LAD to the LV  < end of copied text >    < from: Transthoracic Echocardiogram (06.05.18 @ 13:30) >  CONCLUSIONS:  Limited TTE to r/o effusion post RV biopsy  1. Septal motion consistent with prior cardiac surgery.  Endocardium not well visualized; unable to evaluate left  ventricular systolic function.  2. Normal right ventricular size with decreased right  ventricular systolic function.  3. Trace pericardial effusion at apex.    < end of copied text >    < from: Transthoracic Echocardiogram (06.03.18 @ 09:09) >  Dimensions:    Normal Values:  LA:     5.0    2.0 - 4.0 cm  Ao:     3.5    2.0 - 3.8 cm  SEPTUM: 0.9    0.6 - 1.2 cm  PWT:    0.8    0.6 - 1.1 cm  LVIDd:  4.9    3.0 - 5.6 cm  LVIDs:         1.8 - 4.0 cm  Derived variables:  LVMI: 79 g/m2  RWT: 0.32  ------------------------------------------------------------------------  Conclusions:  1. Dilated left atrium post transplant.   LA volume index =  42 cc/m2.  2. Endocardium not well visualized; grossly normal left  ventricular systolic function. Septal motion consistent  with cardiac surgery.  3. Normal right ventricular size with decreased right  ventricular systolic function.  ------------------------------------------------------------------------    < end of copied text >

## 2018-06-13 NOTE — H&P ADULT - NSHPPHYSICALEXAM_GEN_ALL_CORE
Constitutional:   ill looking   Eyes: EOMI, PERRL  ENMT:  WDL  Neck:  no bruits ,no thyromegaly  Breasts: not examined  Back: n o deformities no kyphosis  Respiratory :  b/l breath sounds rhochous in bases  Cardiovascular:  S1 S2 RRR regular rate and rhythm no murmurs rubs  Gastrointestinal: Soft nontender positive bowels sounds   Genitourinary:  not examined  Rectal: not examined  Extremities:  equal strength throughout + pedal pulse no edema  Vascular :Equal and normal throughout  Neurological: Alert and oriented no focal deficits  Skin:  surgical incision MTI  normal no rashes   Lymph Nodes: non tender   Musculoskeletal: equal  strength throughout

## 2018-06-13 NOTE — ED PROVIDER NOTE - OBJECTIVE STATEMENT
68M h/o 68M h/o CHF, DVT, HepC, HTN, Cardiomyopathy, Heart transplant (Dr. Ti Parker, 2/2018) presents from home with noted fever of 101F by home nurse. He has no current complaints, denies any cough, chest pain, SOB, urinary complaints (other than darker urine than usual), abdominal pain, N/V/D. Recent admission for hyperglycemia, likely 2/2 steroid/prograf use, but FS in ER 100s.

## 2018-06-13 NOTE — CONSULT NOTE ADULT - PROBLEM SELECTOR RECOMMENDATION 3
-recheck cbc -Will hold Cellcept for now  -Decrease prednisone to 5 mg PO BID  -Given home dose of tacrolimus at 8 mg PO BID  -Check tacrolimus trough levels daily  -hold diltiazem; will need adjusting tacrolimus -Will hold Cellcept for now  -Decrease prednisone to 5 mg PO BID  -Given home dose of tacrolimus at 8 mg PO BID  -Check tacrolimus trough levels daily  -hold diltiazem; will need adjusting tacrolimus.  -Continue aspirin and atorvastatin.

## 2018-06-13 NOTE — CONSULT NOTE ADULT - ATTENDING COMMENTS
MrEduar Baez presents with neutropenic Mr. Baez presents with neutropenic fever with systemic symptoms and signs of SIRS. We have started broad spectrum antibiotics. Will hold Cellcept and give Filgrastrim. We will monitor in the step down unit with invasive arterial blood pressure monitoring.     Please call me with questions at [phone].

## 2018-06-13 NOTE — ED ADULT NURSE REASSESSMENT NOTE - NS ED NURSE REASSESS COMMENT FT1
Pt arrive to ED tachpneic, tachycardic and febrile. Ofirmev and antibioitcs administered. Temperature decrease and respirations WNL. EMS report bed bugs seen at home. Pt placed on contact precautions, all belongings triple bagged and secures. ED RN speak with Komal at infectious disease. Vera JOSEPH, Maribeth GARCIA and infectious disease see pt in ED.  Pt hypotensive arounf 1300. Srinivas GARCIA notified. Srinivas GARCIA speak with Dr Stahl (pt cardiologist) who states pt should not receive additional IV fluid. Maribeth GARCIA states he wasn't to come see pt again to consult CCU vs CTU. Pt resting in bed comfortably. ED RN closely monitoring blood pressure.

## 2018-06-13 NOTE — ED ADULT TRIAGE NOTE - CHIEF COMPLAINT QUOTE
fever/ cough- heart transplant pt fever/ cough- heart transplant pt  as per ems pt had bugs on them and in their bed

## 2018-06-13 NOTE — ED PROVIDER NOTE - ATTENDING CONTRIBUTION TO CARE
Patient is a 69 yo M with hx of chf, dvt, htn, s/p hep c heart transplant in 2/2018 here for fever that likely started yesterday. Patient denies feeling more short of breath than his baseline. Denies abdominal pain, chest pain. He has noted that his urine has been dark but denies dysuria. He has been complaint with his immunosuppressant medications. Patient's doctor, Dr. Nunes aware patient in ED and RN from team is present to help coordinate care. Given patient arrives febrile and tachycardic, sepsis protocol initiated, patient given broad spectrum Abx. Pt's baseline HR is in 100-teens.   VS noted, mildly hypoxic on RA  Gen. no acute distress, Non toxic, chronically ill  HEENT: EOMI, mmm  Lungs: decreased breath sounds on right anterior chest  CVS: tachycardic  Abd; Soft non tender, non distended   Ext: no edema  Skin: no rash  Neuro AAOx3 non focal clear speech  a/p: fever, immunocompromised - sepsis work up, labs, cultures, IVF - start with 1 liter - pt with known right pleural effusion and after discussion with Dr. Nunes - 1 liter to be given slowly. XR, admit.   - Srinivas GARCIA

## 2018-06-13 NOTE — CONSULT NOTE ADULT - PROBLEM SELECTOR RECOMMENDATION 9
-cont home immunosuppresants  -cont home prophylactic abx  -cont diltiazem as also serves to elevate tacro levels -cont home immunosuppresants  -check tacrolimus levels  -cont home prophylactic abx  -cont diltiazem as also serves to elevate tacro levels -cont home immunosuppresants  -check tacrolimus levels  -cont home prophylactic abx  -cont diltiazem as also serves to elevate tacro levels  -TTE -cont home immunosuppresants  -check tacrolimus levels  -hold valgancyclovir, cont remaining prophylactic abx  -hold cellcept  -hold diltiazem; will need adjusting tacrolimus   -TTE -Continue antibiotics per transplant ID recommendations. Blood cultures pending.  -No evidence of UTI.   -CMV PCR pending   -Start Filgrastrim 480 mcg daily times three days  -Immunosuppresion as below.

## 2018-06-13 NOTE — ED PROVIDER NOTE - PHYSICAL EXAMINATION
Marya Joe DO.:   GENERAL: Patient awake alert NAD.  HEENT: Moist mucous membranes, PERRL, EOMI  LUNGS: Decreased BS on right Bases, no wheezes or crackles.   CARDIAC: RRR, no m/r/g.    ABDOMEN: Soft, NT, ND, No rebound, guarding. Well healed scars presents, no signs of infection.  EXT: No edema. No calf tenderness.  NEURO: A&Ox3. Gait normal.    SKIN: Warm and dry. No rash.

## 2018-06-13 NOTE — CONSULT NOTE ADULT - ASSESSMENT
67 year old man with ACC/AHA stage D chronic systolic heart failure due to NICM s/p HM2 LVAD 6/17 c/b pump thrombosis s/p heart transplant on 2/23 (CMV D-/R+ and Toxo D-/R+), with post-op course c/b primary graft dysfunction, initially thought to be immune-mediated due to positive B-cell flow (negative CDC cross match) and received plasmapharesis/IVIg x2 with improvement in LV function; course was also complicated by bilateral IJ/subclavian thrombi (on lovenox). Given persistent C4D staining and decline in LV function in April he was treated with plasmapharesis/IVIg and rituxan (received 2 doses, last 5/9) for suspected AMR (noted to have non-HLA Ab to angiotension II). Has since started Mavyret for HCV treatment found SIRS with headache and vision changes concerning for CMV; alternate infection also possible. He is not in overt heart failure.     Please call me at [phone] for any further questions up till 5 pm. For after hours, please call [phone] HF attending. 67 year old man with prior history of chronic heart failure due to NICM s/p HM2 LVAD 6/17 c/b pump thrombosis s/p heart transplant on 2/23 (CMV D-/R+ and Toxo D-/R+), with post-op course c/b graft dysfunction currently with borderline to mildly decreased LV ejection fraction possibly due to antibody mediated rejection, treated with plasmapheresis, IVIG, and rituximab. He now presents with severe leukopenia and neutropenic fevers with SIRS. He is borderline hypotensive.     Please call me at [phone] for any further questions up till 5 pm. For after hours, please call [phone] HF attending.

## 2018-06-13 NOTE — ED PROVIDER NOTE - MEDICAL DECISION MAKING DETAILS
68M p/w fever s/p transplant. Will get sepsis workup, BCx, UA, UCx, CXR, basic labs, tacrolimus level, CMV PCR. Will contact transplant team. Admit.

## 2018-06-13 NOTE — H&P ADULT - NSHPREVIEWOFSYSTEMS_GEN_ALL_CORE
GENERAL SYMPTOMS:  weakness, fevers or chills  ENT:  visual changes;  No vertigo or throat pain   SKIN/BREAST: Negative  NECK: No pain or stiffness  RESPIRATORY/THORAX:  cough,  sputum  shortness of breath  CARDIOVASCULAR: No chest pain or palpitations  GASTROINTESTINAL: No abdominal or epigastric pain. No nausea, vomiting, or hematemesis; No diarrhea or constipation.   GENITOURINARY: No dysuria, frequency or hematuria  NEUROLOGICAL: No numbness or weakness  MUSCULOSKELETAL:  right middle finger necrotic  equal strength throughout   SKIN:   surgical incision stable NAZ No itching, burning, rashes, or lesions   All other review of systems is negative unless indicated above.  HEMATOLOGY/LYMPHATICS: Negative  ENDOCRINE: Negative

## 2018-06-13 NOTE — H&P ADULT - NSHPLABSRESULTS_GEN_ALL_CORE
CBC Full  -  ( 13 Jun 2018 13:20 )  WBC Count : 0.9 K/uL  Hemoglobin : 6.8 g/dL  Hematocrit : 20.5 %  Platelet Count - Automated : 252 K/uL  Mean Cell Volume : 95.7 fl  Mean Cell Hemoglobin : 31.8 pg  Mean Cell Hemoglobin Concentration : 33.3 gm/dL  Auto Neutrophil # : x  Auto Lymphocyte # : x  Auto Monocyte # : x  Auto Eosinophil # : x  Auto Basophil # : x  Auto Neutrophil % : x  Auto Lymphocyte % : x  Auto Monocyte % : x  Auto Eosinophil % : x  Auto Basophil % : x

## 2018-06-13 NOTE — H&P ADULT - ASSESSMENT
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 EF 43% His course also complicated by bilateral IJ/subclavian  thrombi, small  persistent right pleural effusion as well as acute on chronic renal failure. 5/23/18 admitted for hyperglycemia, hyponatremia and acute kidney injury  Today 6/18 BIBEMS to [facility] ED reports fever  rigors, labored breathing productive cough with clear sputum  for past two days ,unable to give himself injections r/t shaking home RN called 911    In ER, sinus tach 130s improved to 110s after 1L IVF and vanc/zosyn infusions. /75, satting high 90s on 2L NC, w resp rate 22. Rectal temp recorded 38.8C.  Admitted to 2 [facility] SDU  Seen by heart failure team ananya callahan

## 2018-06-13 NOTE — ED PROVIDER NOTE - NS ED ROS FT
CONST: no fevers (did not note any fevers at home), no chills  EYES: no pain, no vision changes  ENT: no sore throat, no ear pain, no change in hearing  CV: no chest pain, no leg swelling  RESP: no shortness of breath, +chronic cough  ABD: no abdominal pain, no nausea, no vomiting, no diarrhea  : no dysuria, no flank pain, no hematuria  MSK: no back pain, no extremity pain  NEURO: no headache or additional neurologic complaints  HEME: no easy bleeding  SKIN:  no rash

## 2018-06-13 NOTE — CONSULT NOTE ADULT - ATTENDING COMMENTS
Patient seen and examined with Dr. Fonseca I agree with his history, exam findings and plasn as noted above.  Briefly, Mr. Baez is 3.5mos. post heart transplant donor HCV+. Patient was recently hospitalized for hyperglycemia with a negative infection work up during that hospitalization. He was discharged home a week ago. Returns with complaints of a day of fevers/chills. dry cough, no N/V or GI complaints. No travel other than to the local store. Lives with family members.  Exam notable for febrile, tachycardic, lying flat on stretcher  no oral lesions or thrush  lungs with sternotomy wound healed, diminished BS at right base  nontender abdomen  blister on abdomen present last admission is healing  Labs notable for significant neutropenia and anemia but nl plts.    Blood cultures sent and pending  Suspect pneumonia viral vs. bacterial process as he takes Mepron for prophylaxis  possibly CMV with leukopenia     urine legionella ag , crypt. ag,   fungitell, CMV PCR  RVP  chest CT scan    Given one dose of Vancomycin in ED  Given Zosyn- past colonization with CR pseudomonas but sensitive to Zosyn so would continue for now      Gary Lujan MD  [phone]  After 5pm/weekends [phone]

## 2018-06-13 NOTE — H&P ADULT - PSH
AICD (Automatic Cardioverter/Defibrillator) Present  [employer] [place] with 1 [employer] [place] lead4/1/09- explanted and replaced with Arrivelytronic 2 leads on 9/2/09  H/O heart transplant  2/2018  History of Prior Ablation Treatment  for afib  LVAD (left ventricular assist device) present    Status post left hip replacement

## 2018-06-13 NOTE — ED ADULT NURSE NOTE - OBJECTIVE STATEMENT
68y male brought in by EMS c/o fever/ A&Ox3, tachycardic, tachypneic and febrile. Hx of heart transplant on 2/23/18. Heart transplant nurse visits q3 days. Upon RN visit this morning, pt found to be febrile and tachypneic. Upon arrival to ED, pt 104.2F rectally, tachypneic to 68y male brought in by EMS c/o fever/ A&Ox3, tachycardic, tachypneic and febrile. Hx of heart transplant on 2/23/18. Heart transplant nurse visits q3 days. Upon RN visit this morning, pt found to be febrile and tachypneic. Upon arrival to ED, pt 104.2F rectally, tachypneic to 24 resp/min and tachycardic to 130bpm. Code sepsis called. Pt has no complaints at this time. Denies chest pain, sob, n/v/d. Lung sounds diminished. Chest incision is not red, warm or swollen. Pt states urine more concentrated than normal. Denies burning upon urination, frequency and burning upon urination. EMS states bedbugs present at home, pt placed on contact precaution upon arrival to ED.

## 2018-06-14 DIAGNOSIS — N25.89 OTHER DISORDERS RESULTING FROM IMPAIRED RENAL TUBULAR FUNCTION: ICD-10-CM

## 2018-06-14 LAB
ALBUMIN SERPL ELPH-MCNC: 2.1 G/DL — LOW (ref 3.3–5)
ALP SERPL-CCNC: 37 U/L — LOW (ref 40–120)
ALT FLD-CCNC: 9 U/L — LOW (ref 10–45)
ANION GAP SERPL CALC-SCNC: 13 MMOL/L — SIGNIFICANT CHANGE UP (ref 5–17)
ANION GAP SERPL CALC-SCNC: 13 MMOL/L — SIGNIFICANT CHANGE UP (ref 5–17)
APTT BLD: 29.7 SEC — SIGNIFICANT CHANGE UP (ref 27.5–37.4)
AST SERPL-CCNC: 12 U/L — SIGNIFICANT CHANGE UP (ref 10–40)
BILIRUB SERPL-MCNC: 1.3 MG/DL — HIGH (ref 0.2–1.2)
BUN SERPL-MCNC: 21 MG/DL — SIGNIFICANT CHANGE UP (ref 7–23)
BUN SERPL-MCNC: 24 MG/DL — HIGH (ref 7–23)
C DIFF GDH STL QL: NEGATIVE — SIGNIFICANT CHANGE UP
C DIFF GDH STL QL: SIGNIFICANT CHANGE UP
CALCIUM SERPL-MCNC: 6.4 MG/DL — CRITICAL LOW (ref 8.4–10.5)
CALCIUM SERPL-MCNC: 8.2 MG/DL — LOW (ref 8.4–10.5)
CHLORIDE SERPL-SCNC: 103 MMOL/L — SIGNIFICANT CHANGE UP (ref 96–108)
CHLORIDE SERPL-SCNC: 112 MMOL/L — HIGH (ref 96–108)
CMV DNA CSF QL NAA+PROBE: SIGNIFICANT CHANGE UP
CMV DNA SPEC NAA+PROBE-LOG#: SIGNIFICANT CHANGE UP LOGIU/ML
CO2 SERPL-SCNC: 16 MMOL/L — LOW (ref 22–31)
CO2 SERPL-SCNC: 21 MMOL/L — LOW (ref 22–31)
CREAT SERPL-MCNC: 1.08 MG/DL — SIGNIFICANT CHANGE UP (ref 0.5–1.3)
CREAT SERPL-MCNC: 1.22 MG/DL — SIGNIFICANT CHANGE UP (ref 0.5–1.3)
CRYPTOC AG FLD QL: NEGATIVE — SIGNIFICANT CHANGE UP
CULTURE RESULTS: SIGNIFICANT CHANGE UP
GLUCOSE BLDC GLUCOMTR-MCNC: 103 MG/DL — HIGH (ref 70–99)
GLUCOSE BLDC GLUCOMTR-MCNC: 125 MG/DL — HIGH (ref 70–99)
GLUCOSE BLDC GLUCOMTR-MCNC: 125 MG/DL — HIGH (ref 70–99)
GLUCOSE BLDC GLUCOMTR-MCNC: 91 MG/DL — SIGNIFICANT CHANGE UP (ref 70–99)
GLUCOSE BLDC GLUCOMTR-MCNC: 91 MG/DL — SIGNIFICANT CHANGE UP (ref 70–99)
GLUCOSE SERPL-MCNC: 67 MG/DL — LOW (ref 70–99)
GLUCOSE SERPL-MCNC: 80 MG/DL — SIGNIFICANT CHANGE UP (ref 70–99)
HCT VFR BLD CALC: 20.6 % — CRITICAL LOW (ref 39–50)
HCT VFR BLD CALC: 22 % — LOW (ref 39–50)
HCT VFR BLD CALC: 24 % — LOW (ref 39–50)
HGB BLD-MCNC: 6.5 G/DL — CRITICAL LOW (ref 13–17)
HGB BLD-MCNC: 6.8 G/DL — CRITICAL LOW (ref 13–17)
HGB BLD-MCNC: 7.8 G/DL — LOW (ref 13–17)
MCHC RBC-ENTMCNC: 29.5 PG — SIGNIFICANT CHANGE UP (ref 27–34)
MCHC RBC-ENTMCNC: 30.1 PG — SIGNIFICANT CHANGE UP (ref 27–34)
MCHC RBC-ENTMCNC: 30.7 GM/DL — LOW (ref 32–36)
MCHC RBC-ENTMCNC: 30.8 PG — SIGNIFICANT CHANGE UP (ref 27–34)
MCHC RBC-ENTMCNC: 31.6 GM/DL — LOW (ref 32–36)
MCHC RBC-ENTMCNC: 32.6 GM/DL — SIGNIFICANT CHANGE UP (ref 32–36)
MCV RBC AUTO: 94.4 FL — SIGNIFICANT CHANGE UP (ref 80–100)
MCV RBC AUTO: 95.1 FL — SIGNIFICANT CHANGE UP (ref 80–100)
MCV RBC AUTO: 96.1 FL — SIGNIFICANT CHANGE UP (ref 80–100)
MRSA PCR RESULT.: SIGNIFICANT CHANGE UP
OB PNL STL: NEGATIVE — SIGNIFICANT CHANGE UP
PLATELET # BLD AUTO: 290 K/UL — SIGNIFICANT CHANGE UP (ref 150–400)
PLATELET # BLD AUTO: 293 K/UL — SIGNIFICANT CHANGE UP (ref 150–400)
PLATELET # BLD AUTO: 300 K/UL — SIGNIFICANT CHANGE UP (ref 150–400)
POTASSIUM SERPL-MCNC: 3.3 MMOL/L — LOW (ref 3.5–5.3)
POTASSIUM SERPL-MCNC: 4.1 MMOL/L — SIGNIFICANT CHANGE UP (ref 3.5–5.3)
POTASSIUM SERPL-SCNC: 3.3 MMOL/L — LOW (ref 3.5–5.3)
POTASSIUM SERPL-SCNC: 4.1 MMOL/L — SIGNIFICANT CHANGE UP (ref 3.5–5.3)
PROT SERPL-MCNC: 4.7 G/DL — LOW (ref 6–8.3)
RBC # BLD: 2.17 M/UL — LOW (ref 4.2–5.8)
RBC # BLD: 2.29 M/UL — LOW (ref 4.2–5.8)
RBC # BLD: 2.54 M/UL — LOW (ref 4.2–5.8)
RBC # FLD: 18.7 % — HIGH (ref 10.3–14.5)
RBC # FLD: 19.1 % — HIGH (ref 10.3–14.5)
RBC # FLD: 19.2 % — HIGH (ref 10.3–14.5)
S AUREUS DNA NOSE QL NAA+PROBE: SIGNIFICANT CHANGE UP
SODIUM SERPL-SCNC: 137 MMOL/L — SIGNIFICANT CHANGE UP (ref 135–145)
SODIUM SERPL-SCNC: 141 MMOL/L — SIGNIFICANT CHANGE UP (ref 135–145)
SPECIMEN SOURCE: SIGNIFICANT CHANGE UP
T GONDII IGG SER QL: 4.7 IU/ML — SIGNIFICANT CHANGE UP
T GONDII IGG SER QL: NEGATIVE — SIGNIFICANT CHANGE UP
TACROLIMUS SERPL-MCNC: 8.2 NG/ML — SIGNIFICANT CHANGE UP
TSH SERPL-MCNC: 1.19 UIU/ML — SIGNIFICANT CHANGE UP (ref 0.27–4.2)
WBC # BLD: 0.9 K/UL — CRITICAL LOW (ref 3.8–10.5)
WBC # BLD: 1 K/UL — CRITICAL LOW (ref 3.8–10.5)
WBC # BLD: 1 K/UL — CRITICAL LOW (ref 3.8–10.5)
WBC # FLD AUTO: 0.9 K/UL — CRITICAL LOW (ref 3.8–10.5)
WBC # FLD AUTO: 1 K/UL — CRITICAL LOW (ref 3.8–10.5)
WBC # FLD AUTO: 1 K/UL — CRITICAL LOW (ref 3.8–10.5)

## 2018-06-14 PROCEDURE — 99232 SBSQ HOSP IP/OBS MODERATE 35: CPT | Mod: GC

## 2018-06-14 PROCEDURE — 99221 1ST HOSP IP/OBS SF/LOW 40: CPT

## 2018-06-14 PROCEDURE — 99233 SBSQ HOSP IP/OBS HIGH 50: CPT

## 2018-06-14 RX ADMIN — FAMOTIDINE 20 MILLIGRAM(S): 10 INJECTION INTRAVENOUS at 12:37

## 2018-06-14 RX ADMIN — SODIUM CHLORIDE 3 MILLILITER(S): 9 INJECTION INTRAMUSCULAR; INTRAVENOUS; SUBCUTANEOUS at 20:56

## 2018-06-14 RX ADMIN — SODIUM CHLORIDE 3 MILLILITER(S): 9 INJECTION INTRAMUSCULAR; INTRAVENOUS; SUBCUTANEOUS at 06:46

## 2018-06-14 RX ADMIN — Medication 480 MICROGRAM(S): at 12:57

## 2018-06-14 RX ADMIN — PIPERACILLIN AND TAZOBACTAM 25 GRAM(S): 4; .5 INJECTION, POWDER, LYOPHILIZED, FOR SOLUTION INTRAVENOUS at 06:45

## 2018-06-14 RX ADMIN — Medication 1 TABLET(S): at 12:37

## 2018-06-14 RX ADMIN — INSULIN GLARGINE 7 UNIT(S): 100 INJECTION, SOLUTION SUBCUTANEOUS at 22:20

## 2018-06-14 RX ADMIN — Medication 3 UNIT(S): at 17:09

## 2018-06-14 RX ADMIN — TACROLIMUS 8 MILLIGRAM(S): 5 CAPSULE ORAL at 08:01

## 2018-06-14 RX ADMIN — Medication 1 TABLET(S): at 06:45

## 2018-06-14 RX ADMIN — MAGNESIUM OXIDE 400 MG ORAL TABLET 400 MILLIGRAM(S): 241.3 TABLET ORAL at 17:10

## 2018-06-14 RX ADMIN — Medication 650 MILLIGRAM(S): at 22:20

## 2018-06-14 RX ADMIN — TACROLIMUS 8 MILLIGRAM(S): 5 CAPSULE ORAL at 19:58

## 2018-06-14 RX ADMIN — Medication 5 MILLIGRAM(S): at 17:09

## 2018-06-14 RX ADMIN — SODIUM CHLORIDE 3 MILLILITER(S): 9 INJECTION INTRAMUSCULAR; INTRAVENOUS; SUBCUTANEOUS at 13:01

## 2018-06-14 RX ADMIN — Medication 5 MILLIGRAM(S): at 06:45

## 2018-06-14 RX ADMIN — PIPERACILLIN AND TAZOBACTAM 25 GRAM(S): 4; .5 INJECTION, POWDER, LYOPHILIZED, FOR SOLUTION INTRAVENOUS at 14:18

## 2018-06-14 RX ADMIN — MAGNESIUM OXIDE 400 MG ORAL TABLET 400 MILLIGRAM(S): 241.3 TABLET ORAL at 06:45

## 2018-06-14 RX ADMIN — Medication 20 MILLIGRAM(S): at 22:20

## 2018-06-14 RX ADMIN — Medication 650 MILLIGRAM(S): at 14:19

## 2018-06-14 RX ADMIN — PIPERACILLIN AND TAZOBACTAM 25 GRAM(S): 4; .5 INJECTION, POWDER, LYOPHILIZED, FOR SOLUTION INTRAVENOUS at 22:20

## 2018-06-14 RX ADMIN — Medication 81 MILLIGRAM(S): at 12:37

## 2018-06-14 RX ADMIN — ATOVAQUONE 1500 MILLIGRAM(S): 750 SUSPENSION ORAL at 12:37

## 2018-06-14 RX ADMIN — Medication 650 MILLIGRAM(S): at 06:45

## 2018-06-14 NOTE — PROVIDER CONTACT NOTE (OTHER) - BACKGROUND
6/2017 LVAD  2/23 heart transplant Hep C heart  6/13 readmit with fever rigor & SOB  pt on 8 mg tacro BID

## 2018-06-14 NOTE — PROGRESS NOTE ADULT - PROBLEM SELECTOR PLAN 1
RBC x 1 this am - hct =20  will rpt hct   if still low - will transfuse a 2nd unit rbc RBC x 1 this am - hct =20  rpt hct =24  heme consulted  ck cbc w/ diff, reticulocyte count, & anemia w/u

## 2018-06-14 NOTE — PROGRESS NOTE ADULT - PROBLEM SELECTOR PLAN 4
Unclear etiology. No overt evidence of bleeding. Transfused 1 Unit of PRBCs this morning.   Will consult hematology to assist in evaluation (i.e. bone marrow suppression vs. hemolysis)

## 2018-06-14 NOTE — PROGRESS NOTE ADULT - ASSESSMENT
Mr. 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 He was admitted on 6/13 with severe leukopenia and neutropenic fevers with SIRS and hypotension. He is currently improved, but still neutropenic.      Please call me with questions at [phone]. Plan discussed in multidisciplinary rounds with CTU team.

## 2018-06-14 NOTE — PROGRESS NOTE ADULT - SUBJECTIVE AND OBJECTIVE BOX
Follow Up:  Neutropenic Fevers    Interval History: Afebrile overnight. Feels fine today. Coughing is essentially gone, he's not short of breath and has no chest pain. No more chills or fevers. No sore throat or runny nose.     ROS:    All other systems negative  Constitutional: no fever, no chills  HEENT:  no sore throat, no rhinorrhea  Cardiovascular:  no chest pain, no palpitation  Respiratory:  no SOB, no cough  GI:  was told he had a loose bowel movement but no diarrhea as far as he knows   urinary: no dysuria  skin:  no rash    Allergies  No Known Allergies        ANTIMICROBIALS:  atovaquone Suspension 1500 daily  piperacillin/tazobactam IVPB. 3.375 every 8 hours      OTHER MEDS:  aspirin enteric coated 81 milliGRAM(s) Oral daily  atorvastatin 10 milliGRAM(s) Oral at bedtime  calcium carbonate 1250 mG + Vitamin D (OsCal 500 + D) 1 Tablet(s) Oral two times a day  famotidine    Tablet 20 milliGRAM(s) Oral daily  filgrastim-sndz Injectable 480 MICROGram(s) SubCutaneous daily  insulin glargine Injectable (LANTUS) 7 Unit(s) SubCutaneous at bedtime  insulin lispro Injectable (HumaLOG) 3 Unit(s) SubCutaneous before breakfast  insulin lispro Injectable (HumaLOG) 3 Unit(s) SubCutaneous before lunch  insulin lispro Injectable (HumaLOG) 3 Unit(s) SubCutaneous before dinner  magnesium oxide 400 milliGRAM(s) Oral every 12 hours  Mavyret 100mg/40mg 3 Tablet(s) 3 Tablet(s) Oral daily  multivitamin 1 Tablet(s) Oral daily  predniSONE   Tablet 5 milliGRAM(s) Oral two times a day  sodium bicarbonate 650 milliGRAM(s) Oral every 8 hours  sodium chloride 0.9% lock flush 3 milliLiter(s) IV Push every 8 hours  tacrolimus 8 milliGRAM(s) Oral two times a day      Vital Signs Last 24 Hrs  T(C): 36.9 (2018 09:40), Max: 37.9 (2018 13:09)  T(F): 98.5 (2018 09:40), Max: 100.2 (2018 13:09)  HR: 103 (2018 09:40) (91 - 116)  BP: 104/74 (2018 09:12) (86/63 - 113/73)  BP(mean): 76 (2018 23:26) (76 - 79)  RR: 15 (2018 09:40) (15 - 20)  SpO2: 97% (2018 09:40) (95% - 100%)    Physical Exam:  General:    NAD,  non toxic, A&O   HEENT:  no oropharyngeal lesions, no LAD  Cardiovascular: regular rate and rhythm   Respiratory:  nonlabored, faint crackles Right base, otherwise clear b/l  abd: soft, BS +, no tenderness  : no  diaz  Musculoskeletal: no joint swelling,   no edema  Skin: healed/healing surgical scars over chest abdomen. no rash  Neurologic:     no focal deficit                        6.8    1.0   )-----------( 300      ( 2018 07:28 )             22.0           137  |  103  |  24<H>  ----------------------------<  80  4.1   |  21<L>  |  1.22    Ca    8.2<L>      2018 10:40    TPro  4.7<L>  /  Alb  2.1<L>  /  TBili  1.3<H>  /  DBili  x   /  AST  12  /  ALT  9<L>  /  AlkPhos  37<L>        Urinalysis Basic - ( 2018 11:23 )    Color: Yellow / Appearance: Clear / S.013 / pH: x  Gluc: x / Ketone: Negative  / Bili: Negative / Urobili: Negative   Blood: x / Protein: 30 mg/dL / Nitrite: Negative   Leuk Esterase: Negative / RBC: 2-5 /HPF / WBC 2-5 /HPF   Sq Epi: x / Non Sq Epi: x / Bacteria: x        MICROBIOLOGY:  Culture - Sputum . (18 @ 21:58)    Gram Stain:   Rare polymorphonuclear leukocytes per low power field  No Squamous epithelial cells per low power field  Rare Gram Negative Rods per oil power field    Specimen Source: .Sputum Sputum    Blood cultures in lab     Rapid RVP Result: NotDetec ( @ 19:12)    RADIOLOGY:  Xray Chest 1 View- PORTABLE-Urgent (18 @ 12:40)   Poor inspiratory effort effort. Heart is normal in size. Platelike   atelectasis right lower lobe. The left lung appears to be clear. Status   post sternotomy. Follow Up:  Neutropenic Fevers    Interval History: Afebrile overnight. Feels fine today. Coughing is essentially gone but then he coughed a moderate amount of sputum in front of me. He's not short of breath and has no chest pain. No more chills or fevers. No sore throat or runny nose.     ROS:    All other systems negative  Constitutional: no fever, no chills  HEENT:  no sore throat, no rhinorrhea  Cardiovascular:  no chest pain, no palpitation  Respiratory:  no SOB, no cough  GI:  was told he had a loose bowel movement but no diarrhea as far as he knows   urinary: no dysuria  skin:  no rash    Allergies  No Known Allergies        ANTIMICROBIALS:  atovaquone Suspension 1500 daily  piperacillin/tazobactam IVPB. 3.375 every 8 hours      OTHER MEDS:  aspirin enteric coated 81 milliGRAM(s) Oral daily  atorvastatin 10 milliGRAM(s) Oral at bedtime  calcium carbonate 1250 mG + Vitamin D (OsCal 500 + D) 1 Tablet(s) Oral two times a day  famotidine    Tablet 20 milliGRAM(s) Oral daily  filgrastim-sndz Injectable 480 MICROGram(s) SubCutaneous daily  insulin glargine Injectable (LANTUS) 7 Unit(s) SubCutaneous at bedtime  insulin lispro Injectable (HumaLOG) 3 Unit(s) SubCutaneous before breakfast  insulin lispro Injectable (HumaLOG) 3 Unit(s) SubCutaneous before lunch  insulin lispro Injectable (HumaLOG) 3 Unit(s) SubCutaneous before dinner  magnesium oxide 400 milliGRAM(s) Oral every 12 hours  Mavyret 100mg/40mg 3 Tablet(s) 3 Tablet(s) Oral daily  multivitamin 1 Tablet(s) Oral daily  predniSONE   Tablet 5 milliGRAM(s) Oral two times a day  sodium bicarbonate 650 milliGRAM(s) Oral every 8 hours  sodium chloride 0.9% lock flush 3 milliLiter(s) IV Push every 8 hours  tacrolimus 8 milliGRAM(s) Oral two times a day      Vital Signs Last 24 Hrs  T(C): 36.9 (2018 09:40), Max: 37.9 (2018 13:09)  T(F): 98.5 (2018 09:40), Max: 100.2 (2018 13:09)  HR: 103 (2018 09:40) (91 - 116)  BP: 104/74 (2018 09:12) (86/63 - 113/73)  BP(mean): 76 (2018 23:26) (76 - 79)  RR: 15 (2018 09:40) (15 - 20)  SpO2: 97% (2018 09:40) (95% - 100%)    Physical Exam:  General:    NAD,  non toxic, A&O   HEENT:  no oropharyngeal lesions, no LAD  Cardiovascular: regular rate and rhythm   Respiratory:  nonlabored, decreased breath sounds and faint crackles at the Right base, otherwise clear b/l  abd: soft, BS +, no tenderness  : no  diaz  Musculoskeletal: no joint swelling,   no edema  Skin: healed/healing surgical scars over chest abdomen. no rash  Neurologic:     no focal deficit                        6.8    1.0   )-----------( 300      ( 2018 07:28 )             22.0       06-14    137  |  103  |  24<H>  ----------------------------<  80  4.1   |  21<L>  |  1.22    Ca    8.2<L>      2018 10:40    TPro  4.7<L>  /  Alb  2.1<L>  /  TBili  1.3<H>  /  DBili  x   /  AST  12  /  ALT  9<L>  /  AlkPhos  37<L>        Urinalysis Basic - ( 2018 11:23 )    Color: Yellow / Appearance: Clear / S.013 / pH: x  Gluc: x / Ketone: Negative  / Bili: Negative / Urobili: Negative   Blood: x / Protein: 30 mg/dL / Nitrite: Negative   Leuk Esterase: Negative / RBC: 2-5 /HPF / WBC 2-5 /HPF   Sq Epi: x / Non Sq Epi: x / Bacteria: x        MICROBIOLOGY:  Culture - Sputum . (18 @ 21:58)    Gram Stain:   Rare polymorphonuclear leukocytes per low power field  No Squamous epithelial cells per low power field  Rare Gram Negative Rods per oil power field    Specimen Source: .Sputum Sputum    Blood cultures in lab     Rapid RVP Result: NotDetec ( @ 19:12)    RADIOLOGY:  Xray Chest 1 View- PORTABLE-Urgent (06.13.18 @ 12:40)   Poor inspiratory effort effort. Heart is normal in size. Platelike   atelectasis right lower lobe. The left lung appears to be clear. Status   post sternotomy.

## 2018-06-14 NOTE — PROVIDER CONTACT NOTE (OTHER) - ACTION/TREATMENT ORDERED:
no changes in orders at this time. will continue to monitor & will notify provider for changes in status

## 2018-06-14 NOTE — PROGRESS NOTE ADULT - SUBJECTIVE AND OBJECTIVE BOX
Subjective: He feels better today with no nausea or fever. No headache. No systemic symptoms. He notes improvement in his breathing.     Medications:  aspirin enteric coated 81 milliGRAM(s) Oral daily  atorvastatin 10 milliGRAM(s) Oral at bedtime  atovaquone Suspension 1500 milliGRAM(s) Oral daily  calcium carbonate 1250 mG + Vitamin D (OsCal 500 + D) 1 Tablet(s) Oral two times a day  famotidine    Tablet 20 milliGRAM(s) Oral daily  filgrastim-sndz Injectable 480 MICROGram(s) SubCutaneous daily  insulin glargine Injectable (LANTUS) 7 Unit(s) SubCutaneous at bedtime  insulin lispro Injectable (HumaLOG) 3 Unit(s) SubCutaneous before breakfast  insulin lispro Injectable (HumaLOG) 3 Unit(s) SubCutaneous before lunch  insulin lispro Injectable (HumaLOG) 3 Unit(s) SubCutaneous before dinner  magnesium oxide 400 milliGRAM(s) Oral every 12 hours  Mavyret 100mg/40mg 3 Tablet(s) 3 Tablet(s) Oral daily  multivitamin 1 Tablet(s) Oral daily  piperacillin/tazobactam IVPB. 3.375 Gram(s) IV Intermittent every 8 hours  predniSONE   Tablet 5 milliGRAM(s) Oral two times a day  sodium bicarbonate 650 milliGRAM(s) Oral every 8 hours  sodium chloride 0.9% lock flush 3 milliLiter(s) IV Push every 8 hours  tacrolimus 8 milliGRAM(s) Oral two times a day    Physical Exam:    T(C): 36.8 (06-14-18 @ 12:15), Max: 37.7 (06-13-18 @ 20:00)  HR: 98 (06-14-18 @ 12:15) (91 - 116)  BP: 104/74 (06-14-18 @ 09:12) (98/66 - 113/73)  BP(mean): 76 (06-13-18 @ 23:26) (76 - 79)  ABP: 127/69 (06-14-18 @ 12:15) (91/64 - 127/69)  ABP(mean): 78 (06-13-18 @ 16:21) (78 - 78)  RR: 14 (06-14-18 @ 12:15) (14 - 20)  SpO2: 97% (06-14-18 @ 12:15) (97% - 100%)    Daily Height in cm: 170.18 (14 Jun 2018 07:13)      I&O's Summary    13 Jun 2018 07:01  -  14 Jun 2018 07:00  --------------------------------------------------------  IN: 200 mL / OUT: 525 mL / NET: -325 mL    14 Jun 2018 07:01  -  14 Jun 2018 13:56  --------------------------------------------------------  IN: 120 mL / OUT: 0 mL / NET: 120 mL    General: No distress. Comfortable.  HEENT: EOM intact.  Neck: Neck supple. JVP not elevated. No masses  Chest: Clear to auscultation bilaterally  CV: Tachycardic, but regular. Normal S1 and S2. No rubs or gallops. III/VI diastolic murmur heard across precordium.  Radial pulses normal. No edema.  Abdomen: Soft, non-distended, non-tender  Skin: No rashes or skin breakdown  Neurology: Alert and oriented times three. Sensation intact  Psych: Affect normal    Labs:                        6.8    1.0   )-----------( 300      ( 14 Jun 2018 07:28 )             22.0     06-14    137  |  103  |  24<H>  ----------------------------<  80  4.1   |  21<L>  |  1.22    Ca    8.2<L>      14 Jun 2018 10:40    TPro  4.7<L>  /  Alb  2.1<L>  /  TBili  1.3<H>  /  DBili  x   /  AST  12  /  ALT  9<L>  /  AlkPhos  37<L>  06-14    PT/INR - ( 13 Jun 2018 17:21 )   PT: 14.1 sec;   INR: 1.30 ratio      PTT - ( 14 Jun 2018 08:16 )  PTT:29.7 sec    Serum Pro-Brain Natriuretic Peptide: 2146 pg/mL (06-13 @ 17:21)

## 2018-06-14 NOTE — PROGRESS NOTE ADULT - PROBLEM SELECTOR PLAN 3
Continue to hold CellCept given leukopenia  Continue tacrolimus 8 mg PO BID  Continue reduced dose of prednisone 5 mg PO BID

## 2018-06-14 NOTE — PROGRESS NOTE ADULT - ATTENDING COMMENTS
Patient seen and examined with Dr. Fonseca. I agree with his interval history, exam findings and plans    He looks and feels a bit better today, coughing yellowish phlegm  Had an episode of diarrhea this afternoon    will follow up blood cultures  will send stool studies for C.dif., GI-PCR,  CMV viral load- called the lab and they  have not yet received a specimen:  Reordered CMV VL  Consider starting G-CSF  Valganciclovir is temporarily being held until neutropenia improves  optho screen for CMV retinitis

## 2018-06-14 NOTE — PROGRESS NOTE ADULT - ASSESSMENT
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 EF 43% His course also complicated by bilateral IJ/subclavian  thrombi, small  persistent right pleural effusion as well as acute on chronic renal failure. 5/23/18 admitted for hyperglycemia, hyponatremia and acute kidney injury  Today 6/18 BIBEMS to [facility] ED reports fever  rigors, labored breathing productive cough with clear sputum  for past two days ,unable to give himself injections r/t shaking home RN called 911    In ER, sinus tach 130s improved to 110s after 1L IVF and vanc/zosyn infusions. /75, satting high 90s on 2L NC, w resp rate 22. Rectal temp recorded 38.8C.  Admitted to [facility] SDU  Seen by heart failure team ananya callahan      6/14 - HCT =20.6 Packed RBCs ordered by DR. Stahl- will check HCT 2 hrs post-transfusion- if HCT still low - will transfuse a 2nd unit RBC  echo done - unchanged from last echo  tacro level 8.2 - will d/w Dr. 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 EF 43% His course also complicated by bilateral IJ/subclavian  thrombi, small  persistent right pleural effusion as well as acute on chronic renal failure. 5/23/18 admitted for hyperglycemia, hyponatremia and acute kidney injury  Today 6/18 BIBEMS to [facility] ED reports fever  rigors, labored breathing productive cough with clear sputum  for past two days ,unable to give himself injections r/t shaking home RN called 911    In ER, sinus tach 130s improved to 110s after 1L IVF and vanc/zosyn infusions. /75, satting high 90s on 2L NC, w resp rate 22. Rectal temp recorded 38.8C.  Admitted to [address] [place] [address] SDU  Seen by heart failure team ananya callahan      6/14 - HCT =20.6 Packed RBCs ordered by DR. Stahl- will check HCT 2 hrs post-transfusion- if HCT still low - will transfuse a 2nd unit RBC  echo done - unchanged from last echo  tacro level 8.2 - will d/w Dr. Stahl-pt to remain in Step down unit on neutropenic precautions for WBC 1  Optho consult appreciated to r/o CMV retinitis - eyes dilated  Heme consult-to see pt in am - persistent anemia - etiology - ?hemolysis ?bone marrow ?blood loss  d/c planning- return home

## 2018-06-14 NOTE — CONSULT NOTE ADULT - SUBJECTIVE AND OBJECTIVE BOX
67 yom PMHx Hep C, chronic heart failure due to NICM s/p s/p heart transplant on 2/23.  Pt admitted for severe leukopenia and neutropenic fevers. Ophtho consulted to r/o CMV retinitis though prior CMV PCR is negative? Pt denies change in vision, flashes, floaters, eye pain, diplopia or photophobia.    PMH: as above  Meds: see med rec  POcHx (including surgeries/lasers/trauma):  None  Drops: None  FamHx: None  Social Hx: None  Allergies: NKDA    ROS:  General (neg), Vision (per HPI), Head and Neck (neg), Pulm (neg), CV (neg), GI (neg),  (neg), Musculoskeletal (neg), Skin/Integ (neg), Neuro (neg), Endocrine (neg), Heme (neg), All/Immuno (neg)    Mood and Affect Appropriate ( x ),  Oriented to Time, Place, and Person x 3 ( x )    Ophthalmology Exam    Visual acuity (cc): 20/25 OU  Pupils: PERRL OU, no APD  Ttono: 12, 14  Extraocular movements (EOMs): Full OU, no pain, no diplopia   Confrontational Visual Field (CVF):  Full OU  Color Plates: 10/12, 9/12    Pen Light Exam (PLE)  External:  Flat OU  Lids/Lashes/Lacrimal Ducts: Flat OU    Sclera/Conjunctiva:  W+Q OU  Cornea: Cl OU  Anterior Chamber: D+F OU  Iris:  Flat OU  Lens:  NS OU    Fundus Exam: dilated with 1% tropicamide and 2.5% phenylephrine  Approval obtained from primary team for dilation  Patient aware that pupils can remained dilated for at least 4-6 hours  Exam performed with 20D lens    Vitreous: wnl OU  Disc, cup/disc: sharp and pink, 0.4 OU  Macula:  wnl OU  Vessels:  wnl OU  Periphery: wnl OU    A&P  >>68 yom w/ neutropenic and fever s/p heart tx  -Neg for CMV retinitis or other retina pathology on exam  -Cont management per ID and transplant team  -Pt requests reading glasses, can f/u as outpt    Follow-Up:  Patient should follow up his/her ophthalmologist or in the [facility] within 1 week of discharge.  [address] [place] [address].  [place] [address] [phone]    S/D/W Dr Islas (attending) 67 yom PMHx Hep C, chronic heart failure due to NICM s/p s/p heart transplant on 2/23.  Pt admitted for severe leukopenia and neutropenic fevers. Ophtho consulted to r/o CMV retinitis though prior CMV PCR is negative? Pt denies change in vision, flashes, floaters, eye pain, diplopia or photophobia.    PMH: as above  Meds: see med rec  POcHx (including surgeries/lasers/trauma):  None  Drops: None  FamHx: None  Social Hx: None  Allergies: NKDA    ROS:  General (neg), Vision (per HPI), Head and Neck (neg), Pulm (neg), CV (neg), GI (neg),  (neg), Musculoskeletal (neg), Skin/Integ (neg), Neuro (neg), Endocrine (neg), Heme (neg), All/Immuno (neg)    Mood and Affect Appropriate ( x ),  Oriented to Time, Place, and Person x 3 ( x )    Ophthalmology Exam    Visual acuity (cc): 20/25 OU  Pupils: PERRL OU, no APD  Ttono: 12, 14  Extraocular movements (EOMs): Full OU, no pain, no diplopia   Confrontational Visual Field (CVF):  Full OU  Color Plates: 10/12, 9/12    Pen Light Exam (PLE)  External:  Flat OU  Lids/Lashes/Lacrimal Ducts: Flat OU    Sclera/Conjunctiva:  W+Q OU  Cornea: Cl OU  Anterior Chamber: D+F OU  Iris:  Flat OU  Lens:  NS OU    Fundus Exam: dilated with 1% tropicamide and 2.5% phenylephrine  Approval obtained from primary team for dilation  Patient aware that pupils can remained dilated for at least 4-6 hours  Exam performed with 20D lens    Vitreous: wnl OU  Disc, cup/disc: sharp and pink, 0.4 OU  Macula:  wnl OU  Vessels:  wnl OU  Periphery: wnl OU, no retinal infiltrates, no hemorrhages OU    A&P  >>68 yom w/ neutropenic and fever s/p heart tx  -Neg for CMV retinitis or other retina pathology on exam  -Cont management per ID and transplant team  -Pt requests reading glasses, can f/u as outpt  - findings and plan discussed with primary team    Follow-Up:  Patient should follow up his/her ophthalmologist or in the [facility] Practice within 1 week of discharge.  [address] [place] [address]  [phone]    S/D/W Dr Islas (attending)

## 2018-06-14 NOTE — PROGRESS NOTE ADULT - PROBLEM SELECTOR PLAN 2
Improved. Will continue arterial line today. If he continues to be stable, will discontinue tomorrow.

## 2018-06-14 NOTE — PROGRESS NOTE ADULT - SUBJECTIVE AND OBJECTIVE BOX
VITAL SIGNS-Tele: SR/ST     Vital Signs Last 24 Hrs  T(C): 36.9 (06-14-18 @ 09:40), Max: 37.9 (06-13-18 @ 13:09)  HR: 103 (06-14-18 @ 09:40) (91 - 123)  BP: 104/74 (06-14-18 @ 09:12) (86/63 - 114/76)  SpO2: 97% (06-14-18 @ 09:40) (95% - 100%)         06-13 @ 07:01  -  06-14 @ 07:00  --------------------------------------------------------  IN: 200 mL / OUT: 525 mL / NET: -325 mL    06-14 @ 07:01  -  06-14 @ 10:51  --------------------------------------------------------  IN: 120 mL / OUT: 0 mL / NET: 120 mL    Daily     Daily     CAPILLARY BLOOD GLUCOSE  POCT Blood Glucose.: 91 mg/dL (14 Jun 2018 08:07)  POCT Blood Glucose.: 103 mg/dL (14 Jun 2018 01:56)  POCT Blood Glucose.: 107 mg/dL (13 Jun 2018 22:31)  POCT Blood Glucose.: 102 mg/dL (13 Jun 2018 15:26)  POCT Blood Glucose.: 100 mg/dL (13 Jun 2018 10:58)       Drains:     MS         [  ] Drainage:                 L Pleural  [  ]  Drainage:                R Pleural  [  ]  Drainage:  Pacing Wires        [  ]   Settings:                                  Isolated  [  ]  Coumadin    [ ] YES          [  ]      NO         Reason:       PHYSICAL EXAM:  Neurology: alert and oriented x 3, nonfocal, no gross deficits  CV : S1S2  Sternal Wound :  CDI , Stable  Lungs: CTA  Abdomen: soft, nontender, nondistended, positive bowel sounds, last bowel movement         Extremities:     no edema no calf tenderness    aspirin enteric coated 81 milliGRAM(s) Oral daily  atorvastatin 10 milliGRAM(s) Oral at bedtime  atovaquone Suspension 1500 milliGRAM(s) Oral daily  calcium carbonate 1250 mG + Vitamin D (OsCal 500 + D) 1 Tablet(s) Oral two times a day  famotidine    Tablet 20 milliGRAM(s) Oral daily  filgrastim-sndz Injectable 480 MICROGram(s) SubCutaneous daily  insulin glargine Injectable (LANTUS) 7 Unit(s) SubCutaneous at bedtime  insulin lispro Injectable (HumaLOG) 3 Unit(s) SubCutaneous before breakfast  insulin lispro Injectable (HumaLOG) 3 Unit(s) SubCutaneous before lunch  insulin lispro Injectable (HumaLOG) 3 Unit(s) SubCutaneous before dinner  magnesium oxide 400 milliGRAM(s) Oral every 12 hours  Mavyret 100mg/40mg 3 Tablet(s) 3 Tablet(s) Oral daily  multivitamin 1 Tablet(s) Oral daily  piperacillin/tazobactam IVPB. 3.375 Gram(s) IV Intermittent every 8 hours  predniSONE   Tablet 5 milliGRAM(s) Oral two times a day  sodium bicarbonate 650 milliGRAM(s) Oral every 8 hours  sodium chloride 0.9% lock flush 3 milliLiter(s) IV Push every 8 hours  tacrolimus 8 milliGRAM(s) Oral two times a day    Physical Therapy Rec:   Home  [ x ]   Home w/ PT  [  ]  Rehab  [  ]  Discussed with Cardiothoracic Team at AM rounds.

## 2018-06-14 NOTE — CONSULT NOTE ADULT - ATTENDING COMMENTS
I have interviewed and examined the patient and reviewed the residents note including the history, exam, assessment, and plan.  I agree with the residents assessment and plan.    68 yom w/ neutropenic and fever s/p heart tx  -Neg for CMV retinitis or other retina pathology on exam  -Cont management per ID and transplant team  -Pt requests reading glasses, can f/u as outpt  - findings and plan discussed with primary team  - pt advised to tell team if symptoms change (flashes, floaters, etc.)    Follow-Up:  Patient should follow up his/her ophthalmologist or in the [facility] within 1 week of discharge.    Neeta Islas MD

## 2018-06-14 NOTE — PROGRESS NOTE ADULT - PROBLEM SELECTOR PLAN 7
Continue atovaquone for PCP prophylaxis.   We will hold valganciclovir for now given possible role in leukopenia. CMV PCR pending.

## 2018-06-14 NOTE — PROGRESS NOTE ADULT - ASSESSMENT
68M s/p cardiac transplant 2/23/18 for NICM, on Tacrolimus, Cellcept and Prednisone and HCV acquired from donor on Mavyret, recent hospitalization 5/23-6/6/18 for medication-induced hyperglycemia, admitted 6/13/18 for sepsis, 101.8F and tachycardia, with neutropenia. Had a slight productive cough concerning for pneumonia. CXR with grossly unchanged RLL opacity, read as atelectasis. RVP and urine Legionella negative. Had Pseudomonas on sputum cultures in March, current sputum gram stain with rare GNR. Feels much better today, respiratory symptoms essentially gone, on Zosyn day 2. Prednisone dose has been decreased from 7.5mg to 5mg BID.   Reported by EMS to have bedbugs. None seen now and no skin complaints.     Recommend  -continue Zosyn 3.375GM IV q8h   -check CT chest   -f/u sputum culture   -f/u Fungitell, serum cryptococcal antigen, toxoplasmosis titers   -check HHV6 PCR - ordered  -f/u blood cultures   -f/u serum CMV PCR  -Mepron for PCP PPX, Valgancyclovir for CMV PPX 68M s/p cardiac transplant 2/23/18 for NICM, on Tacrolimus, Cellcept and Prednisone and HCV acquired from donor on Mavyret, admitted 6/13/18 for sepsis, 104.2F and tachycardia, neutropenia. Suspect pneumonia. Had Pseudomonas on sputum cultures in March, current sputum gram stain with rare GNR. CXR with grossly unchanged RLL opacity, read as atelectasis. RVP and urine Legionella negative. Improved, on Zosyn day 2. Prednisone dose has been decreased from 7.5mg to 5mg BID.   Reported by EMS to have bedbugs. None seen and no skin lesions or complaints.     Recommend  -continue Zosyn 3.375GM IV q8h   -check CT chest   -f/u sputum culture   -f/u Fungitell, serum cryptococcal antigen, toxoplasmosis titers   -check HHV6 PCR - ordered  -f/u blood cultures   -f/u serum CMV PCR  -Mepron for PCP PPX, Valgancyclovir for CMV PPX 68M s/p cardiac transplant 2/23/18 for NICM, on Tacrolimus, Cellcept and Prednisone and HCV acquired from donor on Mavyret, admitted 6/13/18 for sepsis, 104.2F and tachycardia, neutropenia. Suspect pneumonia. Had Pseudomonas on sputum cultures in March, current sputum gram stain with rare GNR. CXR with grossly unchanged RLL opacity, read as atelectasis. RVP and urine Legionella negative. Improved, on Zosyn day 2. Prednisone dose has been decreased from 7.5mg to 5mg BID.   Reported by EMS to have bedbugs. None seen and no skin lesions or complaints.   New loose stools, watery this afternoon.     Recommend  -continue Zosyn 3.375GM IV q8h   -check CT chest   -f/u sputum culture   -f/u Fungitell, serum cryptococcal antigen, toxoplasmosis titers   -check HHV6 PCR - ordered  -f/u blood cultures   -check GI PCR panel and C diff - ordered  -f/u serum CMV PCR  -Mepron for PCP PPX

## 2018-06-14 NOTE — PROGRESS NOTE ADULT - PROBLEM SELECTOR PLAN 1
Evaluation underway. Unclear etiology  Continue filgastrim for three days total for now.   Continue Vancomycin and Zosyn per ID recs.

## 2018-06-15 DIAGNOSIS — N17.9 ACUTE KIDNEY FAILURE, UNSPECIFIED: ICD-10-CM

## 2018-06-15 LAB
ANION GAP SERPL CALC-SCNC: 13 MMOL/L — SIGNIFICANT CHANGE UP (ref 5–17)
BASOPHILS # BLD AUTO: 0 K/UL — SIGNIFICANT CHANGE UP (ref 0–0.2)
BASOPHILS NFR BLD AUTO: 0.5 % — SIGNIFICANT CHANGE UP (ref 0–2)
BUN SERPL-MCNC: 31 MG/DL — HIGH (ref 7–23)
CALCIUM SERPL-MCNC: 8.6 MG/DL — SIGNIFICANT CHANGE UP (ref 8.4–10.5)
CHLORIDE SERPL-SCNC: 106 MMOL/L — SIGNIFICANT CHANGE UP (ref 96–108)
CMV DNA CSF QL NAA+PROBE: SIGNIFICANT CHANGE UP
CMV DNA SPEC NAA+PROBE-LOG#: SIGNIFICANT CHANGE UP LOGIU/ML
CO2 SERPL-SCNC: 21 MMOL/L — LOW (ref 22–31)
CREAT SERPL-MCNC: 1.62 MG/DL — HIGH (ref 0.5–1.3)
EOSINOPHIL # BLD AUTO: 0 K/UL — SIGNIFICANT CHANGE UP (ref 0–0.5)
EOSINOPHIL NFR BLD AUTO: 3.4 % — SIGNIFICANT CHANGE UP (ref 0–6)
FERRITIN SERPL-MCNC: 925 NG/ML — HIGH (ref 30–400)
GLUCOSE BLDC GLUCOMTR-MCNC: 127 MG/DL — HIGH (ref 70–99)
GLUCOSE BLDC GLUCOMTR-MCNC: 132 MG/DL — HIGH (ref 70–99)
GLUCOSE BLDC GLUCOMTR-MCNC: 89 MG/DL — SIGNIFICANT CHANGE UP (ref 70–99)
GLUCOSE BLDC GLUCOMTR-MCNC: 95 MG/DL — SIGNIFICANT CHANGE UP (ref 70–99)
GLUCOSE SERPL-MCNC: 93 MG/DL — SIGNIFICANT CHANGE UP (ref 70–99)
HAPTOGLOB SERPL-MCNC: 310 MG/DL — HIGH (ref 34–200)
HCT VFR BLD CALC: 26 % — LOW (ref 39–50)
HGB BLD-MCNC: 8.3 G/DL — LOW (ref 13–17)
IRON SATN MFR SERPL: 11 % — LOW (ref 16–55)
IRON SATN MFR SERPL: 19 UG/DL — LOW (ref 45–165)
LDH SERPL L TO P-CCNC: 309 U/L — HIGH (ref 50–242)
LYMPHOCYTES # BLD AUTO: 0.3 K/UL — LOW (ref 1–3.3)
LYMPHOCYTES # BLD AUTO: 30.1 % — SIGNIFICANT CHANGE UP (ref 13–44)
MCHC RBC-ENTMCNC: 30.3 PG — SIGNIFICANT CHANGE UP (ref 27–34)
MCHC RBC-ENTMCNC: 31.9 GM/DL — LOW (ref 32–36)
MCV RBC AUTO: 95.1 FL — SIGNIFICANT CHANGE UP (ref 80–100)
MONOCYTES # BLD AUTO: 0.2 K/UL — SIGNIFICANT CHANGE UP (ref 0–0.9)
MONOCYTES NFR BLD AUTO: 23.5 % — HIGH (ref 2–14)
NEUTROPHILS # BLD AUTO: 0.4 K/UL — LOW (ref 1.8–7.4)
NEUTROPHILS NFR BLD AUTO: 42.5 % — LOW (ref 43–77)
PLAT MORPH BLD: NORMAL — SIGNIFICANT CHANGE UP
PLATELET # BLD AUTO: 330 K/UL — SIGNIFICANT CHANGE UP (ref 150–400)
POTASSIUM SERPL-MCNC: 3.9 MMOL/L — SIGNIFICANT CHANGE UP (ref 3.5–5.3)
POTASSIUM SERPL-SCNC: 3.9 MMOL/L — SIGNIFICANT CHANGE UP (ref 3.5–5.3)
POTASSIUM UR-SCNC: 21 MMOL/L — SIGNIFICANT CHANGE UP
PROT SERPL-MCNC: 5.6 G/DL — LOW (ref 6–8.3)
PROT SERPL-MCNC: 5.6 G/DL — LOW (ref 6–8.3)
RBC # BLD: 2.74 M/UL — LOW (ref 4.2–5.8)
RBC # BLD: 2.74 M/UL — LOW (ref 4.2–5.8)
RBC # BLD: 2.84 M/UL — LOW (ref 4.2–5.8)
RBC # FLD: 18.6 % — HIGH (ref 10.3–14.5)
RBC BLD AUTO: SIGNIFICANT CHANGE UP
RETICS #: 39.5 K/UL — SIGNIFICANT CHANGE UP (ref 25–125)
RETICS #: 39.9 K/UL — SIGNIFICANT CHANGE UP (ref 25–125)
RETICS/RBC NFR: 1.4 % — SIGNIFICANT CHANGE UP (ref 0.5–2.5)
RETICS/RBC NFR: 1.5 % — SIGNIFICANT CHANGE UP (ref 0.5–2.5)
SODIUM SERPL-SCNC: 140 MMOL/L — SIGNIFICANT CHANGE UP (ref 135–145)
SODIUM UR-SCNC: 43 MMOL/L — SIGNIFICANT CHANGE UP
SODIUM UR-SCNC: 44 MMOL/L — SIGNIFICANT CHANGE UP
SODIUM UR-SCNC: 44 MMOL/L — SIGNIFICANT CHANGE UP
SURGICAL PATHOLOGY STUDY: SIGNIFICANT CHANGE UP
TACROLIMUS SERPL-MCNC: 12.3 NG/ML — SIGNIFICANT CHANGE UP
TIBC SERPL-MCNC: 167 UG/DL — LOW (ref 220–430)
UIBC SERPL-MCNC: 148 UG/DL — SIGNIFICANT CHANGE UP (ref 110–370)
VIT B12 SERPL-MCNC: >2000 PG/ML — HIGH (ref 232–1245)
WBC # BLD: 1 K/UL — CRITICAL LOW (ref 3.8–10.5)
WBC # FLD AUTO: 1 K/UL — CRITICAL LOW (ref 3.8–10.5)

## 2018-06-15 PROCEDURE — 88189 FLOWCYTOMETRY/READ 16 & >: CPT

## 2018-06-15 PROCEDURE — 99233 SBSQ HOSP IP/OBS HIGH 50: CPT

## 2018-06-15 PROCEDURE — 99232 SBSQ HOSP IP/OBS MODERATE 35: CPT | Mod: GC

## 2018-06-15 PROCEDURE — 71250 CT THORAX DX C-: CPT | Mod: 26

## 2018-06-15 PROCEDURE — 99223 1ST HOSP IP/OBS HIGH 75: CPT | Mod: GC

## 2018-06-15 PROCEDURE — 99223 1ST HOSP IP/OBS HIGH 75: CPT

## 2018-06-15 RX ORDER — SODIUM CHLORIDE 9 MG/ML
500 INJECTION INTRAMUSCULAR; INTRAVENOUS; SUBCUTANEOUS ONCE
Qty: 0 | Refills: 0 | Status: COMPLETED | OUTPATIENT
Start: 2018-06-15 | End: 2018-06-15

## 2018-06-15 RX ORDER — TACROLIMUS 5 MG/1
7 CAPSULE ORAL
Qty: 0 | Refills: 0 | Status: DISCONTINUED | OUTPATIENT
Start: 2018-06-15 | End: 2018-06-19

## 2018-06-15 RX ADMIN — Medication 5 MILLIGRAM(S): at 18:01

## 2018-06-15 RX ADMIN — PIPERACILLIN AND TAZOBACTAM 25 GRAM(S): 4; .5 INJECTION, POWDER, LYOPHILIZED, FOR SOLUTION INTRAVENOUS at 13:17

## 2018-06-15 RX ADMIN — SODIUM CHLORIDE 3 MILLILITER(S): 9 INJECTION INTRAMUSCULAR; INTRAVENOUS; SUBCUTANEOUS at 23:26

## 2018-06-15 RX ADMIN — INSULIN GLARGINE 7 UNIT(S): 100 INJECTION, SOLUTION SUBCUTANEOUS at 22:32

## 2018-06-15 RX ADMIN — SODIUM CHLORIDE 3 MILLILITER(S): 9 INJECTION INTRAMUSCULAR; INTRAVENOUS; SUBCUTANEOUS at 13:18

## 2018-06-15 RX ADMIN — Medication 20 MILLIGRAM(S): at 22:32

## 2018-06-15 RX ADMIN — Medication 650 MILLIGRAM(S): at 13:18

## 2018-06-15 RX ADMIN — TACROLIMUS 8 MILLIGRAM(S): 5 CAPSULE ORAL at 08:02

## 2018-06-15 RX ADMIN — Medication 650 MILLIGRAM(S): at 22:32

## 2018-06-15 RX ADMIN — Medication 480 MICROGRAM(S): at 13:01

## 2018-06-15 RX ADMIN — Medication 5 MILLIGRAM(S): at 06:17

## 2018-06-15 RX ADMIN — Medication 1 TABLET(S): at 12:16

## 2018-06-15 RX ADMIN — MAGNESIUM OXIDE 400 MG ORAL TABLET 400 MILLIGRAM(S): 241.3 TABLET ORAL at 06:17

## 2018-06-15 RX ADMIN — Medication 81 MILLIGRAM(S): at 12:17

## 2018-06-15 RX ADMIN — SODIUM CHLORIDE 3 MILLILITER(S): 9 INJECTION INTRAMUSCULAR; INTRAVENOUS; SUBCUTANEOUS at 06:15

## 2018-06-15 RX ADMIN — SODIUM CHLORIDE 1000 MILLILITER(S): 9 INJECTION INTRAMUSCULAR; INTRAVENOUS; SUBCUTANEOUS at 18:31

## 2018-06-15 RX ADMIN — Medication 3 UNIT(S): at 12:15

## 2018-06-15 RX ADMIN — FAMOTIDINE 20 MILLIGRAM(S): 10 INJECTION INTRAVENOUS at 12:17

## 2018-06-15 RX ADMIN — PIPERACILLIN AND TAZOBACTAM 25 GRAM(S): 4; .5 INJECTION, POWDER, LYOPHILIZED, FOR SOLUTION INTRAVENOUS at 22:31

## 2018-06-15 RX ADMIN — TACROLIMUS 7 MILLIGRAM(S): 5 CAPSULE ORAL at 20:47

## 2018-06-15 RX ADMIN — SODIUM CHLORIDE 1000 MILLILITER(S): 9 INJECTION INTRAMUSCULAR; INTRAVENOUS; SUBCUTANEOUS at 14:38

## 2018-06-15 RX ADMIN — MAGNESIUM OXIDE 400 MG ORAL TABLET 400 MILLIGRAM(S): 241.3 TABLET ORAL at 18:01

## 2018-06-15 RX ADMIN — Medication 650 MILLIGRAM(S): at 06:17

## 2018-06-15 RX ADMIN — PIPERACILLIN AND TAZOBACTAM 25 GRAM(S): 4; .5 INJECTION, POWDER, LYOPHILIZED, FOR SOLUTION INTRAVENOUS at 06:17

## 2018-06-15 RX ADMIN — ATOVAQUONE 1500 MILLIGRAM(S): 750 SUSPENSION ORAL at 12:17

## 2018-06-15 RX ADMIN — Medication 3 UNIT(S): at 08:03

## 2018-06-15 NOTE — CONSULT NOTE ADULT - SUBJECTIVE AND OBJECTIVE BOX
[facility] DIVISION OF KIDNEY DISEASES AND HYPERTENSION -- INITIAL CONSULT NOTE  --------------------------------------------------------------------------------  HPI:  Pt is a 68yoAAM w/ PMH of [facility] s/p HM2 LVAD 6/2017 then s/p Heart transplant 2/23/18. 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 Pt now presented w/ fevers rigors and cough. Admitted for sepsis, thought 2/2 pneumonia.    Renal now called for NORMAN.  Pts baseline sCr ~1.1-1.3, and pt presented with sCr of 1.3.  SCr bumped up to 1.6 today.     Pt presented w/ Hgb to 7.7 which improved to 8s s/p 1 unit PRBCs.  Pt neutropenic as well. Cellcept has been held.   Pt w/ BP to systolic 90s on 6/13, which improved s/p NS IVF to systolic of 100s.   Pt received 1 dose of vancomycin 1g and is currently continued on zosyn for broad spectrum coverage.  Tacro level elevated to 12s today- dose was decreased from 8/8 to 7/7 today.     Pt says today he is feeling "great" and feels he can go home.  Says he still has a cough, but it has improved.  Denies any fevers, chills.  Says he has been having loose stools, but only had 1 episode this AM.  Is making urine- but says he only urinated this AM.   Does endorse having "bumps under his right armpit which he did not notice before.     PAST HISTORY  --------------------------------------------------------------------------------  PAST MEDICAL & SURGICAL HISTORY:  DVT of upper extremity (deep vein thrombosis)  Hepatitis C virus  GIB (gastrointestinal bleeding)  Ventricular fibrillation: s/p AICD  PAF (paroxysmal atrial fibrillation): on xarelto  Non-Ischemic Cardiomyopathy  SVT (Supraventricular Tachycardia)  HTN  CHF (Congestive Heart Failure)  H/O heart transplant: 2/2018  LVAD (left ventricular assist device) present  Status post left hip replacement  History of Prior Ablation Treatment: for afib  AICD (Automatic Cardioverter/Defibrillator) Present: [employer] with 1 [employer] lead4/1/09- explanted and replaced with Medtronic 2 leads on 9/2/09    FAMILY HISTORY:  No pertinent family history in first degree relatives    PAST SOCIAL HISTORY:    ALLERGIES & MEDICATIONS  --------------------------------------------------------------------------------  Allergies    No Known Allergies    Intolerances      Standing Inpatient Medications  aspirin enteric coated 81 milliGRAM(s) Oral daily  atovaquone Suspension 1500 milliGRAM(s) Oral daily  Calcium Citrate+D3 (315mg-250mg/tablet) 2 Tablet(s) 2 Tablet(s) Oral two times a day  famotidine    Tablet 20 milliGRAM(s) Oral daily  filgrastim-sndz Injectable 480 MICROGram(s) SubCutaneous daily  insulin glargine Injectable (LANTUS) 7 Unit(s) SubCutaneous at bedtime  insulin lispro Injectable (HumaLOG) 3 Unit(s) SubCutaneous before breakfast  insulin lispro Injectable (HumaLOG) 3 Unit(s) SubCutaneous before lunch  insulin lispro Injectable (HumaLOG) 3 Unit(s) SubCutaneous before dinner  magnesium oxide 400 milliGRAM(s) Oral every 12 hours  Mavyret 100mg/40mg 3 Tablet(s) 3 Tablet(s) Oral daily  multivitamin 1 Tablet(s) Oral daily  piperacillin/tazobactam IVPB. 3.375 Gram(s) IV Intermittent every 8 hours  pravastatin 20 milliGRAM(s) Oral at bedtime  predniSONE   Tablet 5 milliGRAM(s) Oral two times a day  sodium bicarbonate 650 milliGRAM(s) Oral every 8 hours  sodium chloride 0.9% lock flush 3 milliLiter(s) IV Push every 8 hours  tacrolimus 7 milliGRAM(s) Oral <User Schedule>    PRN Inpatient Medications      REVIEW OF SYSTEMS  --------------------------------------------------------------------------------  Gen: No weight changes, fatigue, fevers/chills  Skin: No rashes  Head/Eyes/Ears/Mouth: No headache  Respiratory: No dyspnea, +cough, no wheezing  CV: No chest pain  GI: No abdominal pain, +diarrhea, no nausea, no vomiting  : No increased frequency, dysuria, hematuria  MSK: No joint pain/swelling; no edema  Neuro: No dizziness  Heme: No easy bruising or bleeding  Endo: No heat/cold intolerance  Psych: No significant nervousness    All other systems were reviewed and are negative, except as noted.    VITALS/PHYSICAL EXAM  --------------------------------------------------------------------------------  T(C): 36.7 (06-15-18 @ 08:00), Max: 37 (06-14-18 @ 23:23)  HR: 102 (06-15-18 @ 12:14) (99 - 113)  BP: 108/74 (06-15-18 @ 12:14) (101/63 - 128/86)  RR: 18 (06-15-18 @ 08:00) (16 - 18)  SpO2: 98% (06-15-18 @ 08:00) (95% - 98%)  Wt(kg): --  Height (cm): 170.18 (06-14-18 @ 07:13)      06-14-18 @ 07:01  -  06-15-18 @ 07:00  --------------------------------------------------------  IN: 560 mL / OUT: 700 mL / NET: -140 mL    06-15-18 @ 07:01  -  06-15-18 @ 13:30  --------------------------------------------------------  IN: 780 mL / OUT: 0 mL / NET: 780 mL      Physical Exam:  	Gen: NAD, well-appearing AAM sitting in chair  	HEENT: anicteric  	Pulm: left lower chest coarse breath sounds, few right basilar crackles, otherwise b/l CTA  	CV: RRR, S1S2; no rub  	Back: No spinal or CVA tenderness  	Abd: +BS, soft, nontender/nondistended  	: No suprapubic tenderness  	UE: Warm, no edema; right axillary few moveable nodules palpable- ttp  	LE: Warm, no edema  	Neuro: follows commands  	Psych: Normal affect and mood  	Skin: Warm, without rashes    LABS/STUDIES  --------------------------------------------------------------------------------              8.3    1.0   >-----------<  330      [06-15-18 @ 07:47]              26.0     140  |  106  |  31  ----------------------------<  93      [06-15-18 @ 07:47]  3.9   |  21  |  1.62        Ca     8.6     [06-15-18 @ 07:47]    TPro  5.6  /  Alb  x   /  TBili  x   /  DBili  x   /  AST  x   /  ALT  x   /  AlkPhos  x   [06-15-18 @ 09:35]    PT/INR: PT 14.1 , INR 1.30       [06-13-18 @ 17:21]  PTT: 29.7       [06-14-18 @ 08:16]          [06-15-18 @ 07:47]    Creatinine Trend:  SCr 1.62 [06-15 @ 07:47]  SCr 1.22 [06-14 @ 10:40]  SCr 1.08 [06-14 @ 07:28]  SCr 1.39 [06-13 @ 11:21]  SCr 1.17 [06-06 @ 07:43]    Urinalysis - [06-13-18 @ 11:23]      Color Yellow / Appearance Clear / SG 1.013 / pH 6.0      Gluc Negative / Ketone Negative  / Bili Negative / Urobili Negative       Blood Negative / Protein 30 / Leuk Est Negative / Nitrite Negative      RBC 2-5 / WBC 2-5 / Hyaline  / Gran  / Sq Epi  / Non Sq Epi  / Bacteria       Iron 19, TIBC 167, %sat 11      [06-15-18 @ 09:35]  Ferritin 79.0      [02-13-18 @ 12:44]  HbA1c 10.4      [05-23-18 @ 20:06]  TSH 1.19      [06-13-18 @ 20:23]    HBsAb Nonreact      [05-15-17 @ 15:40]  HBsAg Nonreact      [04-25-18 @ 15:20]  HBcAb Nonreact      [04-25-18 @ 15:20]  HCV 0.21, Nonreact      [04-25-18 @ 15:20]  HIV Nonreact      [02-02-18 @ 19:25]    C3 Complement 135      [03-19-18 @ 15:13]  C4 Complement 37      [03-19-18 @ 15:13]  Free Light Chains: kappa 4.36, lambda 5.71, ratio = 0.76      [03-19 @ 15:13]  Immunofixation Serum:   No Monoclonal Band Identified      [03-19-18 @ 15:13]  SPEP Interpretation: Hypoalbuminemia.      [06-27-17 @ 15:50]  Cryoglobulin: Negative      [03-19-18 @ 15:13]

## 2018-06-15 NOTE — PROGRESS NOTE ADULT - ASSESSMENT
Mr. 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 He was admitted on 6/13 with severe leukopenia and neutropenic fevers with SIRS and hypotension. He is currently improved, but still leukopenic. He has acute worsening of his renal function.     Please call me with questions at [phone]. Plan discussed in multidisciplinary rounds with CTU team.

## 2018-06-15 NOTE — DIETITIAN INITIAL EVALUATION ADULT. - PT NOT SOURCE
other (specify)/Of note, Pt slightly argumentative during interview. Limited detailed information collected at this time.

## 2018-06-15 NOTE — CONSULT NOTE ADULT - SUBJECTIVE AND OBJECTIVE BOX
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 EF 43% His course also complicated by bilateral IJ/subclavian  thrombi, small  persistent right pleural effusion as well as acute on chronic renal failure. 5/23/18 admitted for hyperglycemia, hyponatremia and acute kidney injury  Today 6/18 BIBEMS to [facility] ED reports fever  rigors, labored breathing productive cough with clear sputum  for past two days ,unable to give himself injections r/t shaking home RN called 911    In ER, sinus tach 130s improved to 110s after 1L IVF and vanc/zosyn infusions. /75, satting high 90s on 2L NC, w resp rate 22. Rectal temp recorded 38.8C.  Admitted to 2 [facility] (13 Jun 2018 16:11)      Allergies    No Known Allergies    Intolerances        MEDICATIONS  (STANDING):  aspirin enteric coated 81 milliGRAM(s) Oral daily  atovaquone Suspension 1500 milliGRAM(s) Oral daily  Calcium Citrate+D3 (315mg-250mg/tablet) 2 Tablet(s) 2 Tablet(s) Oral two times a day  famotidine    Tablet 20 milliGRAM(s) Oral daily  filgrastim-sndz Injectable 480 MICROGram(s) SubCutaneous daily  insulin glargine Injectable (LANTUS) 7 Unit(s) SubCutaneous at bedtime  insulin lispro Injectable (HumaLOG) 3 Unit(s) SubCutaneous before breakfast  insulin lispro Injectable (HumaLOG) 3 Unit(s) SubCutaneous before lunch  insulin lispro Injectable (HumaLOG) 3 Unit(s) SubCutaneous before dinner  magnesium oxide 400 milliGRAM(s) Oral every 12 hours  Mavyret 100mg/40mg 3 Tablet(s) 3 Tablet(s) Oral daily  multivitamin 1 Tablet(s) Oral daily  piperacillin/tazobactam IVPB. 3.375 Gram(s) IV Intermittent every 8 hours  pravastatin 20 milliGRAM(s) Oral at bedtime  predniSONE   Tablet 5 milliGRAM(s) Oral two times a day  sodium bicarbonate 650 milliGRAM(s) Oral every 8 hours  sodium chloride 0.9% lock flush 3 milliLiter(s) IV Push every 8 hours  tacrolimus 7 milliGRAM(s) Oral <User Schedule>    MEDICATIONS  (PRN):      PAST MEDICAL & SURGICAL HISTORY:  DVT of upper extremity (deep vein thrombosis)  Hepatitis C virus  GIB (gastrointestinal bleeding)  Ventricular fibrillation: s/p AICD  PAF (paroxysmal atrial fibrillation): on xarelto  Non-Ischemic Cardiomyopathy  SVT (Supraventricular Tachycardia)  HTN  CHF (Congestive Heart Failure)  H/O heart transplant: 2/2018  LVAD (left ventricular assist device) present  Status post left hip replacement  History of Prior Ablation Treatment: for afib  AICD (Automatic Cardioverter/Defibrillator) Present: [employer] with 1 [employer] lead4/1/09- explanted and replaced with Medtronic 2 leads on 9/2/09      FAMILY HISTORY:  No pertinent family history in first degree relatives      SOCIAL HISTORY: No EtOH, no tobacco    REVIEW OF SYSTEMS:    CONSTITUTIONAL: No weakness, fevers or chills  EYES/ENT: No visual changes;  No vertigo or throat pain   NECK: No pain or stiffness  RESPIRATORY: No cough, wheezing, hemoptysis; No shortness of breath  CARDIOVASCULAR: No chest pain or palpitations  GASTROINTESTINAL: No abdominal or epigastric pain. No nausea, vomiting, or hematemesis; No diarrhea or constipation. No melena or hematochezia.  GENITOURINARY: No dysuria, frequency or hematuria  NEUROLOGICAL: No numbness or weakness  SKIN: No itching, burning, rashes, or lesions   All other review of systems is negative unless indicated above.        T(F): 97.7 (06-15-18 @ 16:00), Max: 98.6 (06-14-18 @ 23:23)  HR: 81 (06-15-18 @ 16:00)  BP: 103/68 (06-15-18 @ 16:00)  RR: 18 (06-15-18 @ 16:00)  SpO2: 98% (06-15-18 @ 16:00)  Wt(kg): --    GENERAL: NAD, well-developed  HEAD:  Atraumatic, Normocephalic  EYES: EOMI, PERRLA, conjunctiva and sclera clear  NECK: Supple, No JVD  CHEST/LUNG: Clear to auscultation bilaterally; No wheeze  HEART: Regular rate and rhythm; No murmurs, rubs, or gallops  ABDOMEN: Soft, Nontender, Nondistended; Bowel sounds present  EXTREMITIES:  2+ Peripheral Pulses, No clubbing, cyanosis, or edema  NEUROLOGY: non-focal  SKIN: No rashes or lesions                          8.3    1.0   )-----------( 330      ( 15 Kennedy 2018 07:47 )             26.0       06-15    140  |  106  |  31<H>  ----------------------------<  93  3.9   |  21<L>  |  1.62<H>    Ca    8.6      15 Kennedy 2018 07:47    TPro  5.6<L>  /  Alb  x   /  TBili  x   /  DBili  x   /  AST  x   /  ALT  x   /  AlkPhos  x   06-15      Haptoglobin, Serum: 310 mg/dL (06-15 @ 09:35)  Lactate Dehydrogenase, Serum: 309 U/L (06-15 @ 07:47)

## 2018-06-15 NOTE — PROGRESS NOTE ADULT - PROBLEM SELECTOR PLAN 1
RBC x 1 this am - hct =20  rpt hct =24  heme consulted  ck cbc w/ diff, reticulocyte count, & anemia w/u

## 2018-06-15 NOTE — DIETITIAN INITIAL EVALUATION ADULT. - FACTORS AFF FOOD INTAKE
Pt reports 1 episode of diarrhea and vomiting today./other (specify) Pt reports 1 episode of diarrhea and vomiting today. Pt unaware of the cause, has limited interest in discussing his GI symptoms at this time./other (specify)

## 2018-06-15 NOTE — CONSULT NOTE ADULT - ATTENDING COMMENTS
Patient seen and agree with Dr. Cohen's note. Briefly, patient is 69 y/o male with PMH Non Ischemic Cardiomyopathy, chronic systolic heart failure s/p HeartMate2 LVAD 6/2017 , heart transplant 2/23/18 with post op course complicated by graft dysfunction of unclear etiology and persistent C4d positive straining on endomyocardial biopsy. He received plasmapheresis, IVIG and rituximab with improvement in LV function. 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 Hematology consulted for anemia and leukopenia:    Plan:  1. Leukopenia and anemia:  - Peripheral Blood Smear is of post-transfused blood so not completely reflective of patient's current anemic state. There are some rbcs with large central pallor, no schistocytes, few yenifer/spur cells, few WBCs, but no immature cells or blasts.    - new neutropenia and worsening anemia are likely multifactorial and due to combination of patient's immuno-suppressive medications (Cellcept is being held for that reason) and sepsis, which can cause bone marrow suppression.  - iron studies show low TIBC which is seen in Anemia of Chronic Inflammation, but iron level and saturation are low, need ferritin to correlate.  - please get vitamin B12, folate, repeat reticulocyte count, EBV, parvovirus, erythropoietin, cornel test, fecal occult  - will send peripheral flow cytometry to investigate for underlying hematological process - completed the requisition form and dropped off green top tube. Collected blood should be sent to central processing.  - CBC with differential daily  - transfuse for hgb< 7  - continue to monitor

## 2018-06-15 NOTE — DIETITIAN INITIAL EVALUATION ADULT. - NS AS NUTRI INTERV ED CONTENT
Pt denies need to review heart transplant and DM nutrition therapy at this time. Pt denies to accept written education materials. Pt is aware that RD remains available to monitor PO intake, wt, labs, GI tolerance and diet education review as Pt Amenable./Other (specify)

## 2018-06-15 NOTE — PROGRESS NOTE ADULT - PROBLEM SELECTOR PLAN 4
Unclear etiology. No overt evidence of bleeding. Transfused 1 Unit of PRBCs this yesterday with appropriate increase in hematocrit.   Hematology consulted to assist in evaluation (i.e. bone marrow suppression vs. hemolysis) Unclear etiology. Will monitor closely. Likely related to acute illness.

## 2018-06-15 NOTE — DIETITIAN INITIAL EVALUATION ADULT. - PERTINENT LABORATORY DATA
Hgb/Hct:8./26.0-low, Na:140, K:3.9, Cl:106, BUN:31-high, Cr:1.62, Glucose:93, Ca:8.6, Bg levels: 6/14: , 6/15:  Hgb/Hct:8./26.0-low, Na:140, K:3.9, Cl:106, BUN:31-high, Cr:1.62, Glucose:93, Ca:8.6, Bg levels: 6/14: , 6/15: , Hgba1c (5/2018) 10.4%

## 2018-06-15 NOTE — DIETITIAN INITIAL EVALUATION ADULT. - ORAL INTAKE PTA
Pt reports a good PO intake PTA. Breakfast: eggs and cereal. Lunch and dinner are similar: lamb chops, chicken, diego greens and rice. Pt drinks water./good

## 2018-06-15 NOTE — CONSULT NOTE ADULT - ASSESSMENT
68yoAAM w/ PMH of [facility] s/p HM2 LVAD 6/2017 then s/p Heart transplant 2/23/18. 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 Pt now presented w/ fevers rigors and cough. Admitted for sepsis, thought 2/2 pneumonia. Renal now called for NORMAN.

## 2018-06-15 NOTE — PROGRESS NOTE ADULT - ASSESSMENT
68M s/p cardiac transplant 2/23/18 for NICM, on Tacrolimus, Cellcept and Prednisone and HCV acquired from donor on Mavyret, admitted 6/13/18 for sepsis, 104.2F and tachycardia, neutropenia. Suspect pneumonia. Had Pseudomonas on sputum cultures in March, current sputum gram stain with rare GNR. CXR with grossly unchanged RLL opacity, read as atelectasis. RVP and urine Legionella negative. Improved, on Zosyn day 2. Prednisone dose has been decreased from 7.5mg to 5mg BID.   Reported by EMS to have bedbugs. None seen and no skin lesions or complaints.   New loose stools, watery this afternoon.     Recommend  -continue Zosyn 3.375GM IV q8h   -check CT chest   -f/u sputum culture   -f/u Fungitell, serum cryptococcal antigen, toxoplasmosis titers   -check HHV6 PCR - ordered  -f/u blood cultures   -check GI PCR panel and C diff - ordered  -f/u serum CMV PCR  -Mepron for PCP PPX 68M s/p cardiac transplant 2/23/18 for NICM, on Tacrolimus, Cellcept and Prednisone and HCV acquired from donor on Mavyret, admitted 6/13/18 for sepsis, 104.2F and tachycardia, neutropenia. Treating as pneumonia, improving on Zosyn, afebrile since initial temp. Had Pseudomonas on sputum cultures in March, current sputum with rare GNR but resulted as normal jose. CXR with unchanged RLL atelectasis. RVP and urine Legionella negative. CMV PCR, cryptococcus antigen and toxoplasmosis antibody negative. Blood cultures negative to date.   New loose stool, C diff negative, GI PCR cancelled.   Reported by EMS to have bedbugs. None seen. Now with painful right axillary skin nodules, did not complain of this on presentation.   Bump in creatinine 1.6 today from 1.0. Clearance still around 50.     Recommend  -check Tacrolimus levels given rise in creatinine   -consider Dermatology evaluation of right axillary lesions   -continue Zosyn 3.375GM IV q8h   -Mepron for PCP PPX  -monitor ANC and fevers  -CT chest   -Fungitell, HHV6 PCR   -GI PCR panel 68M s/p cardiac transplant 2/23/18 for NICM, on Tacrolimus, Cellcept and Prednisone and HCV acquired from donor on Mavyret, admitted 6/13/18 for sepsis, 104.2F and tachycardia, neutropenia. Treating as pneumonia, improving on Zosyn, afebrile since initial temp. Had Pseudomonas on sputum cultures in March, current sputum with rare GNR but resulted as normal jose. CXR with unchanged RLL atelectasis. RVP and urine Legionella negative. CMV PCR, cryptococcus antigen and toxoplasmosis antibody negative. Blood cultures negative to date.   New loose stool, C diff negative, GI PCR cancelled.   Reported by EMS to have bedbugs. None seen. Now with painful right axillary skin nodules, did not complain of this on presentation.   Bump in creatinine 1.6 today from 1.0. Clearance still around 50.     Recommend  -continue to follow  Tacrolimus levels given rise in creatinine   -consider Dermatology evaluation of right axillary lesions   -continue Zosyn 3.375GM IV q8h - may have to adjust dose further if further rise in creatinine  -Mepron for PCP PPX  -monitor ANC and fevers  -CT chest - NO IV contrast   -Fungitell, HHV6 PCR   -GI PCR panel     ID service will be covering over the weekend. Please call for acute issues or questions. [phone]

## 2018-06-15 NOTE — DIETITIAN INITIAL EVALUATION ADULT. - ENERGY NEEDS
Ht: 5'7", Wt: 175lbs, BMI: 27.4kg/m2, IBW: 148lbs(+/-10%), 118%IBW  Pertinent information: Pt admitted for neutropenic fevers, and anemia. Per chart, Pt with shitory of NICM, s/p HM2 LVAD implant in 6/2017, recurrent GIB and heart transplant 2/2018.  Pt undergoing w/o for fevers, C-Diff negative.   No Edema, Skin intact Ht: 5'7", Wt: 175lbs, BMI: 27.4kg/m2, IBW: 148lbs(+/-10%), 118%IBW  Pertinent information: Pt admitted for neutropenic fevers, and anemia. Per chart, Pt with history of NICM, s/p HM2 LVAD implant in 6/2017, recurrent GIB and heart transplant 2/2018.  Pt undergoing workup for fevers, C-Diff negative.   No Edema, Skin intact

## 2018-06-15 NOTE — CHART NOTE - NSCHARTNOTEFT_GEN_A_CORE
HPI:  This is a  69 y/o male PMH  NICCM, Chronic systolic HF s/p HM2 LVAD 6/2017 , recurrent GIB  no s/p Heart transplant 2/23/18 post op course complicated bygraft dysfunction of unclear etiology. Admitted for fever, rigors, SOB and productive cough.   Dermatology was consulted for painful lesions on R axillary x 3 days. Pt reports he did not notice them before. Very tender. Might have grown larger. No drainage.   No recent animal scratches or gardening.        PAST MEDICAL & SURGICAL HISTORY:  DVT of upper extremity (deep vein thrombosis)  Hepatitis C virus  GIB (gastrointestinal bleeding)  Ventricular fibrillation: s/p AICD  PAF (paroxysmal atrial fibrillation): on xarelto  Non-Ischemic Cardiomyopathy  SVT (Supraventricular Tachycardia)  HTN  CHF (Congestive Heart Failure)  H/O heart transplant: 2/2018  LVAD (left ventricular assist device) present  Status post left hip replacement  History of Prior Ablation Treatment: for afib  AICD (Automatic Cardioverter/Defibrillator) Present: [employer] with 1 [employer] lead4/1/09- explanted and replaced with [employer] 2 leads on 9/2/09      General: no fevers/chills, no lethargy  Skin: See HPI  Ophthalmologic: no eye pain or change in vision  ENT: no dysphagia or change in hearing  Respiratory: no SOB or cough  Cardiovascular: no palpitations or chest pain  Gastrointestinal: no abdominal  pain or blood in stool  Genitourinary: no dysuria or frequency  Musculoskeletal: no joint pain or weakness  Neurological: no weakness, numbness or tingling    MEDICATIONS  (STANDING):  aspirin enteric coated 81 milliGRAM(s) Oral daily  atovaquone Suspension 1500 milliGRAM(s) Oral daily  Calcium Citrate+D3 (315mg-250mg/tablet) 2 Tablet(s) 2 Tablet(s) Oral two times a day  famotidine    Tablet 20 milliGRAM(s) Oral daily  filgrastim-sndz Injectable 480 MICROGram(s) SubCutaneous daily  insulin glargine Injectable (LANTUS) 7 Unit(s) SubCutaneous at bedtime  insulin lispro Injectable (HumaLOG) 3 Unit(s) SubCutaneous before breakfast  insulin lispro Injectable (HumaLOG) 3 Unit(s) SubCutaneous before lunch  insulin lispro Injectable (HumaLOG) 3 Unit(s) SubCutaneous before dinner  magnesium oxide 400 milliGRAM(s) Oral every 12 hours  Mavyret 100mg/40mg 3 Tablet(s) 3 Tablet(s) Oral daily  multivitamin 1 Tablet(s) Oral daily  piperacillin/tazobactam IVPB. 3.375 Gram(s) IV Intermittent every 8 hours  pravastatin 20 milliGRAM(s) Oral at bedtime  predniSONE   Tablet 5 milliGRAM(s) Oral two times a day  sodium bicarbonate 650 milliGRAM(s) Oral every 8 hours  sodium chloride 0.9% lock flush 3 milliLiter(s) IV Push every 8 hours  tacrolimus 7 milliGRAM(s) Oral <User Schedule>    MEDICATIONS  (PRN):      Allergies    No Known Allergies        SOCIAL HISTORY: Non-smoker    FAMILY HISTORY:  No pertinent family history in first degree relatives      Vital Signs Last 24 Hrs  T(C): 36.5 (15 Kennedy 2018 16:00), Max: 37 (14 Jun 2018 23:23)  T(F): 97.7 (15 Kennedy 2018 16:00), Max: 98.6 (14 Jun 2018 23:23)  HR: 81 (15 Kennedy 2018 16:00) (81 - 113)  BP: 103/68 (15 Kennedy 2018 16:00) (101/63 - 128/86)  BP(mean): 88 (15 Kennedy 2018 03:34) (84 - 88)  RR: 18 (15 Kennedy 2018 16:00) (16 - 18)  SpO2: 98% (15 Kennedy 2018 16:00) (95% - 98%)    PHYSICAL EXAM:     The patient was alert and oriented X 3, well nourished, and in no  apparent distress.  OP showed no ulcerations  There was no visible lymphadenopathy.  Conjunctiva were non injected  There was no clubbing or edema of extremities.  The scalp, hair, face, eyebrows, lips, OP, neck, chest, back,   extremities X 4, nails were examined.  There was no hyperhidrosis or bromhidrosis.    Of note on skin exam:   3 tender skin nodules on R axillary  + cervical lymphadenopathy  hypopigmented patch with exfoliative scale on mid abdomen, crusted papules on L arm and R hand are resolved    LABS:                        8.3    1.0   )-----------( 330      ( 15 Kennedy 2018 07:47 )             26.0     06-15    140  |  106  |  31<H>  ----------------------------<  93  3.9   |  21<L>  |  1.62<H>    Ca    8.6      15 Kennedy 2018 07:47    TPro  5.6<L>  /  Alb  x   /  TBili  x   /  DBili  x   /  AST  x   /  ALT  x   /  AlkPhos  x   06-15    PTT - ( 14 Jun 2018 08:16 )  PTT:29.7 sec          ASSESSMENT AND PLAN:   69 y/o male PMH  NICCM, Chronic systolic HF s/p HM2 LVAD 6/2017 , recurrent GIB  no s/p Heart transplant 2/23/18 post op course complicated bygraft dysfunction of unclear etiology. Admitted for fever, rigors, SOB and productive cough. On Zosyn for presumed pneumonia.  Dermatology was consulted for 3 nodules on R axilla. These are likely to represent lymphadenopathy in the setting of likely acute infection. Pt is severely immunosuppressed, and will keep infectious etiologies that present with lymphadenopathy in differentials.    - No role for skin biopsy for H&E and tissue culture today  - Will follow Chest CT which may better characterize these nodules  - Pt will be formally seen by attending tomorrow         Noel Menezes MD  Resident Physician, PGY-3  Department of Dermatology  [facility]  Pager: [phone]  Office: [phone] HPI:  This is a  67 y/o male PMH  NICCM, Chronic systolic HF s/p HM2 LVAD 6/2017 , recurrent GIB  no s/p Heart transplant 2/23/18 post op course complicated bygraft dysfunction of unclear etiology. Admitted for fever, rigors, SOB and productive cough.   Dermatology was consulted for painful lesions on R axillary x 3 days. Pt reports he did not notice them before. Very tender. Might have grown larger. No drainage.   No recent animal scratches or gardening.        PAST MEDICAL & SURGICAL HISTORY:  DVT of upper extremity (deep vein thrombosis)  Hepatitis C virus  GIB (gastrointestinal bleeding)  Ventricular fibrillation: s/p AICD  PAF (paroxysmal atrial fibrillation): on xarelto  Non-Ischemic Cardiomyopathy  SVT (Supraventricular Tachycardia)  HTN  CHF (Congestive Heart Failure)  H/O heart transplant: 2/2018  LVAD (left ventricular assist device) present  Status post left hip replacement  History of Prior Ablation Treatment: for afib  AICD (Automatic Cardioverter/Defibrillator) Present: [employer] with 1 [employer] lead4/1/09- explanted and replaced with [employer] 2 leads on 9/2/09      General: no fevers/chills, no lethargy  Skin: See HPI  Ophthalmologic: no eye pain or change in vision  ENT: no dysphagia or change in hearing  Respiratory: no SOB or cough  Cardiovascular: no palpitations or chest pain  Gastrointestinal: no abdominal  pain or blood in stool  Genitourinary: no dysuria or frequency  Musculoskeletal: no joint pain or weakness  Neurological: no weakness, numbness or tingling    MEDICATIONS  (STANDING):  aspirin enteric coated 81 milliGRAM(s) Oral daily  atovaquone Suspension 1500 milliGRAM(s) Oral daily  Calcium Citrate+D3 (315mg-250mg/tablet) 2 Tablet(s) 2 Tablet(s) Oral two times a day  famotidine    Tablet 20 milliGRAM(s) Oral daily  filgrastim-sndz Injectable 480 MICROGram(s) SubCutaneous daily  insulin glargine Injectable (LANTUS) 7 Unit(s) SubCutaneous at bedtime  insulin lispro Injectable (HumaLOG) 3 Unit(s) SubCutaneous before breakfast  insulin lispro Injectable (HumaLOG) 3 Unit(s) SubCutaneous before lunch  insulin lispro Injectable (HumaLOG) 3 Unit(s) SubCutaneous before dinner  magnesium oxide 400 milliGRAM(s) Oral every 12 hours  Mavyret 100mg/40mg 3 Tablet(s) 3 Tablet(s) Oral daily  multivitamin 1 Tablet(s) Oral daily  piperacillin/tazobactam IVPB. 3.375 Gram(s) IV Intermittent every 8 hours  pravastatin 20 milliGRAM(s) Oral at bedtime  predniSONE   Tablet 5 milliGRAM(s) Oral two times a day  sodium bicarbonate 650 milliGRAM(s) Oral every 8 hours  sodium chloride 0.9% lock flush 3 milliLiter(s) IV Push every 8 hours  tacrolimus 7 milliGRAM(s) Oral <User Schedule>    MEDICATIONS  (PRN):      Allergies    No Known Allergies        SOCIAL HISTORY: Non-smoker    FAMILY HISTORY:  No pertinent family history in first degree relatives      Vital Signs Last 24 Hrs  T(C): 36.5 (15 Kennedy 2018 16:00), Max: 37 (14 Jun 2018 23:23)  T(F): 97.7 (15 Kennedy 2018 16:00), Max: 98.6 (14 Jun 2018 23:23)  HR: 81 (15 Kennedy 2018 16:00) (81 - 113)  BP: 103/68 (15 Kennedy 2018 16:00) (101/63 - 128/86)  BP(mean): 88 (15 Kennedy 2018 03:34) (84 - 88)  RR: 18 (15 Kennedy 2018 16:00) (16 - 18)  SpO2: 98% (15 Kennedy 2018 16:00) (95% - 98%)    PHYSICAL EXAM:     The patient was alert and oriented X 3, well nourished, and in no  apparent distress.  OP showed no ulcerations  There was no visible lymphadenopathy.  Conjunctiva were non injected  There was no clubbing or edema of extremities.  The scalp, hair, face, eyebrows, lips, OP, neck, chest, back,   extremities X 4, nails were examined.  There was no hyperhidrosis or bromhidrosis.    Of note on skin exam:   3 tender skin nodules on R axillary  + cervical lymphadenopathy  hypopigmented patch with exfoliative scale on mid abdomen, crusted papules on L arm and R hand are resolved    LABS:                        8.3    1.0   )-----------( 330      ( 15 Kennedy 2018 07:47 )             26.0     06-15    140  |  106  |  31<H>  ----------------------------<  93  3.9   |  21<L>  |  1.62<H>    Ca    8.6      15 Eknnedy 2018 07:47    TPro  5.6<L>  /  Alb  x   /  TBili  x   /  DBili  x   /  AST  x   /  ALT  x   /  AlkPhos  x   06-15    PTT - ( 14 Jun 2018 08:16 )  PTT:29.7 sec          ASSESSMENT AND PLAN:   67 y/o male PMH  NICCM, Chronic systolic HF s/p HM2 LVAD 6/2017 , recurrent GIB  no s/p Heart transplant 2/23/18 post op course complicated bygraft dysfunction of unclear etiology. Admitted for fever, rigors, SOB and productive cough. On Zosyn for presumed pneumonia.  Dermatology was consulted for 3 nodules on R axilla. These are likely to represent reactive lymphadenopathy in the setting of likely acute infection. Pt is severely immunosuppressed, and will keep infectious etiologies that present with lymphadenopathy in differentials.    The skin lesions on the mid abd, L arm and R arm for which dermatology service saw him during his last hospitalization have been resolved or healing with complete reepithelialization.    - No role for skin biopsy for H&E and tissue culture today  - Will follow Chest CT which may better characterize these nodules  - Pt will be formally seen by attending tomorrow         Noel Menezes MD  Resident Physician, PGY-3  Department of Dermatology  [facility]  Pager: [phone]  Office: [phone]

## 2018-06-15 NOTE — CONSULT NOTE ADULT - PROBLEM SELECTOR RECOMMENDATION 9
likely hemodynamic in setting of sepsis, with possible pre-renal component in setting of diarrhea, with contribution from elevated tacro level. Possible retention. BP to systolic 90s on 6/13. Received 1 dose of vancomycin 1g.   - Dose zosyn renally.  - Obtain random vancomycin level.  - Obtain UA and Izabela, Cl, K, Cr.  - Monitor tacro levels and adjust dose accordingly; as per Cardio.   - Obtain post-void bladder sono.   - BMP daily.   - Monitor urine output.  - Start on NS IVF at 80cc/hr.

## 2018-06-15 NOTE — PROGRESS NOTE ADULT - PROBLEM SELECTOR PLAN 6
Continue Mavyret daily. Unclear etiology. No overt evidence of bleeding. Transfused 1 Unit of PRBCs this yesterday with appropriate increase in hematocrit.   Hematology consulted to assist in evaluation (i.e. bone marrow suppression vs. hemolysis)

## 2018-06-15 NOTE — CONSULT NOTE ADULT - ASSESSMENT
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 Hematology consulted for anemia:    #Leukopenia and anemia:  - PBS is of post-transfused blood so not completely reflective of patient's current anemic state. There are some rbcs with large central pallor, no schistocytes, few eynifer/spurr cells, few WBCs, but no immature cells or blasts.    - new neutropenia and worsening anemia are likely multifactorial and due to combination of patient's immuno-suppressive medications and sepsis which can cause bone marrow suppression.  - iron studies show low TIBC which is seen in AOCD, but iron level and saturation are low, need ferritin to correlate.  - please get vitamin B12, folate, repeat retic count, EBV, parvovirus, erythropoietin, cornel test, fecal occult  - will send peripheral flow cytometry to investigate for underlying hematological process - completed the requisition form and dropped off green top tube. Collected blood should be sent to central processing.  - CBC with differential daily  - transfuse for hgb< 7  - continue to monitor

## 2018-06-15 NOTE — DIETITIAN INITIAL EVALUATION ADULT. - OTHER INFO
Pt seen for transplant LOS. Pt seen sitting in chair, reports not feeling well today. Pt admits to 1 episode of vomiting and 1 episode of diarrhea. Pt states his appetite is good and denies need for food preferences at this time. Pt states he does monitor his BG levels PTA with usual results between 100-150mg/dl. Pt states he recently went to see a diabetes specialist, where he received education. Pt denies to discuss the DM education he received. Pt reports following all food safety rules however denies to discuss this at this time. Pt denies any weights changes. States last week at DM specialist office he was 154lbs which indicated a 2lb wt gain since previous inhouse RD encounter on June 1st. Although Pt's admission Wt is currently 175lbs which is consistent with his pre-transplant weight. ? accuracy of weights obtained vs Wt's weight history. Pt denies taking micronutrient supplements, states he only takes his medications. Pt denies chewing/swallowing difficulty. NKFA.27.4 Pt seen for transplant LOS. Pt seen sitting in chair, reports not feeling well today. Pt admits to 1 episode of vomiting and 1 episode of diarrhea. Pt states his appetite is good and denies need for food preferences at this time. Pt states he does monitor his BG levels PTA with usual results between 100-150mg/dl. Pt states he recently went to see a diabetes specialist, where he received education. Pt denies to discuss the DM education he received. Pt reports following all food safety rules however denies to discuss this at this time. Pt denies any weights changes. States last week at DM specialist office he was 154lbs which indicated a 2lb wt gain since previous inhouse RD encounter on June 1st. Although Pt's admission Wt is currently 175lbs which is consistent with his pre-transplant weight. ? accuracy of weights obtained vs Wt's weight history. Pt denies taking micronutrient supplements, states he only takes his medications. Pt denies chewing/swallowing difficulty. NKFA.

## 2018-06-15 NOTE — PROGRESS NOTE ADULT - SUBJECTIVE AND OBJECTIVE BOX
Subjective: No current complaints. He has a mildly productive cough, infrequent. No fevers. He says that he feels better. He had watery bowel movement yesterday, but no abdominal pain.     Medications:  aspirin enteric coated 81 milliGRAM(s) Oral daily  atovaquone Suspension 1500 milliGRAM(s) Oral daily  Calcium Citrate+D3 (315mg-250mg/tablet) 2 Tablet(s) 2 Tablet(s) Oral two times a day  famotidine    Tablet 20 milliGRAM(s) Oral daily  filgrastim-sndz Injectable 480 MICROGram(s) SubCutaneous daily  insulin glargine Injectable (LANTUS) 7 Unit(s) SubCutaneous at bedtime  insulin lispro Injectable (HumaLOG) 3 Unit(s) SubCutaneous before breakfast  insulin lispro Injectable (HumaLOG) 3 Unit(s) SubCutaneous before lunch  insulin lispro Injectable (HumaLOG) 3 Unit(s) SubCutaneous before dinner  magnesium oxide 400 milliGRAM(s) Oral every 12 hours  Mavyret 100mg/40mg 3 Tablet(s) 3 Tablet(s) Oral daily  multivitamin 1 Tablet(s) Oral daily  piperacillin/tazobactam IVPB. 3.375 Gram(s) IV Intermittent every 8 hours  pravastatin 20 milliGRAM(s) Oral at bedtime  predniSONE   Tablet 5 milliGRAM(s) Oral two times a day  sodium bicarbonate 650 milliGRAM(s) Oral every 8 hours  sodium chloride 0.9% lock flush 3 milliLiter(s) IV Push every 8 hours  tacrolimus 8 milliGRAM(s) Oral two times a day      Physical Exam:    Vital Signs Last 24 Hrs  T(C): 36.7 (15 Kennedy 2018 03:34), Max: 37 (14 Jun 2018 23:23)  T(F): 98 (15 Kennedy 2018 03:34), Max: 98.6 (14 Jun 2018 23:23)  HR: 99 (15 Kennedy 2018 03:34) (98 - 113)  BP: 109/76 (15 Kennedy 2018 03:34) (101/63 - 109/76)  BP(mean): 88 (15 Kennedy 2018 03:34) (84 - 88)  RR: 16 (14 Jun 2018 23:23) (14 - 18)  SpO2: 95% (14 Jun 2018 23:23) (95% - 98%)    Daily     I&O's Summary    14 Jun 2018 07:01  -  15 Kennedy 2018 07:00  --------------------------------------------------------  IN: 560 mL / OUT: 700 mL / NET: -140 mL    15 Kennedy 2018 07:01  -  15 Kennedy 2018 08:46  --------------------------------------------------------  IN: 360 mL / OUT: 0 mL / NET: 360 mL    General: No distress. Comfortable.  HEENT: EOM intact.  Neck: Neck supple. JVP not elevated. No masses  Chest: Clear to auscultation bilaterally  CV: Normal S1 and S2. No murmurs, rub, or gallops. Radial pulses normal.  Abdomen: Soft, non-distended, non-tender  Skin: No rashes or skin breakdown. Index finger on right hand does not appear infected.   Neurology: Alert and oriented times three. Sensation intact  Psych: Affect normal    Labs:                        8.3    1.0   )-----------( 330      ( 15 Kennedy 2018 07:47 )             26.0     06-15    140  |  106  |  31<H>  ----------------------------<  93  3.9   |  21<L>  |  1.62<H>    Ca    8.6      15 Kennedy 2018 07:47    TPro  4.7<L>  /  Alb  2.1<L>  /  TBili  1.3<H>  /  DBili  x   /  AST  12  /  ALT  9<L>  /  AlkPhos  37<L>  06-14    PT/INR - ( 13 Jun 2018 17:21 )   PT: 14.1 sec;   INR: 1.30 ratio      PTT - ( 14 Jun 2018 08:16 )  PTT:29.7 sec    Serum Pro-Brain Natriuretic Peptide: 2146 pg/mL (06-13 @ 17:21)      Lactate Dehydrogenase, Serum: 309 U/L (06-15 @ 07:47) Subjective: No current complaints. He has a mildly productive cough, infrequent. No fevers. He says that he feels better. He had watery bowel movement yesterday, but no abdominal pain.     Medications:  aspirin enteric coated 81 milliGRAM(s) Oral daily  atovaquone Suspension 1500 milliGRAM(s) Oral daily  Calcium Citrate+D3 (315mg-250mg/tablet) 2 Tablet(s) 2 Tablet(s) Oral two times a day  famotidine    Tablet 20 milliGRAM(s) Oral daily  filgrastim-sndz Injectable 480 MICROGram(s) SubCutaneous daily  insulin glargine Injectable (LANTUS) 7 Unit(s) SubCutaneous at bedtime  insulin lispro Injectable (HumaLOG) 3 Unit(s) SubCutaneous before breakfast  insulin lispro Injectable (HumaLOG) 3 Unit(s) SubCutaneous before lunch  insulin lispro Injectable (HumaLOG) 3 Unit(s) SubCutaneous before dinner  magnesium oxide 400 milliGRAM(s) Oral every 12 hours  Mavyret 100mg/40mg 3 Tablet(s) 3 Tablet(s) Oral daily  multivitamin 1 Tablet(s) Oral daily  piperacillin/tazobactam IVPB. 3.375 Gram(s) IV Intermittent every 8 hours  pravastatin 20 milliGRAM(s) Oral at bedtime  predniSONE   Tablet 5 milliGRAM(s) Oral two times a day  sodium bicarbonate 650 milliGRAM(s) Oral every 8 hours  sodium chloride 0.9% lock flush 3 milliLiter(s) IV Push every 8 hours  tacrolimus 8 milliGRAM(s) Oral two times a day      Physical Exam:    Vital Signs Last 24 Hrs  T(C): 36.7 (15 Kennedy 2018 03:34), Max: 37 (14 Jun 2018 23:23)  T(F): 98 (15 Kennedy 2018 03:34), Max: 98.6 (14 Jun 2018 23:23)  HR: 99 (15 Kennedy 2018 03:34) (98 - 113)  BP: 109/76 (15 Kennedy 2018 03:34) (101/63 - 109/76)  BP(mean): 88 (15 Kennedy 2018 03:34) (84 - 88)  RR: 16 (14 Jun 2018 23:23) (14 - 18)  SpO2: 95% (14 Jun 2018 23:23) (95% - 98%)    Daily     I&O's Summary    14 Jun 2018 07:01  -  15 Kennedy 2018 07:00  --------------------------------------------------------  IN: 560 mL / OUT: 700 mL / NET: -140 mL    15 Kennedy 2018 07:01  -  15 Kennedy 2018 08:46  --------------------------------------------------------  IN: 360 mL / OUT: 0 mL / NET: 360 mL    General: No distress. Comfortable.  HEENT: EOM intact.  Neck: Neck supple. JVP not elevated. No masses  Chest: Clear to auscultation bilaterally  CV: Normal S1 and S2. III/VI diastolic murmur across precordium. No rub, or gallops. Radial pulses normal.  Abdomen: Soft, non-distended, non-tender  Skin: No rashes or skin breakdown. Index finger on right hand does not appear infected.   Neurology: Alert and oriented times three. Sensation intact  Psych: Affect normal    Labs:                        8.3    1.0   )-----------( 330      ( 15 Kennedy 2018 07:47 )             26.0     06-15    140  |  106  |  31<H>  ----------------------------<  93  3.9   |  21<L>  |  1.62<H>    Ca    8.6      15 Kennedy 2018 07:47    TPro  4.7<L>  /  Alb  2.1<L>  /  TBili  1.3<H>  /  DBili  x   /  AST  12  /  ALT  9<L>  /  AlkPhos  37<L>  06-14    PT/INR - ( 13 Jun 2018 17:21 )   PT: 14.1 sec;   INR: 1.30 ratio      PTT - ( 14 Jun 2018 08:16 )  PTT:29.7 sec    Serum Pro-Brain Natriuretic Peptide: 2146 pg/mL (06-13 @ 17:21)      Lactate Dehydrogenase, Serum: 309 U/L (06-15 @ 07:47)

## 2018-06-15 NOTE — PROGRESS NOTE ADULT - PROBLEM SELECTOR PLAN 7
Continue atovaquone for PCP prophylaxis.   We will hold valganciclovir for now given possible role in leukopenia. CMV PCR negative. Continue Mavyret daily.

## 2018-06-15 NOTE — CONSULT NOTE ADULT - ATTENDING COMMENTS
NORMAN ( baseline creatinine has ranged between 1.1-1.3 mg/dl):   possibly secondary to hemodynamic changes secondary to sepsis/Diarrhea. Need to also r/o retention  Would check U/A, Urine lytes  Renal and bladder sono  Would give a trial of IVF for 12 hours ( NS at 50 cc/hr)  F/U viral studies, consider checking Hep c   Trend tacrolimus level  Will follow

## 2018-06-15 NOTE — PROGRESS NOTE ADULT - PROBLEM SELECTOR PLAN 1
Evaluation underway. Unclear etiology. CMV negative. Cultures negative to this point. C. diff negative.   Continue filgastrim for three days total for now.   Continue Vancomycin per ID recs.

## 2018-06-15 NOTE — DIETITIAN INITIAL EVALUATION ADULT. - PHYSICAL APPEARANCE
Nutrition focused physical exam deferred at this time. Pt visually appears well nourished./well nourished

## 2018-06-15 NOTE — DIETITIAN INITIAL EVALUATION ADULT. - ADHERENCE
Pt states he decreased the sugar in his diet and cut out sweetened beverages. Pt admits to following food safety rules./good

## 2018-06-15 NOTE — PROGRESS NOTE ADULT - PROBLEM SELECTOR PLAN 8
Continue atovaquone for PCP prophylaxis.   We will hold valganciclovir for now given possible role in leukopenia. CMV PCR negative.

## 2018-06-15 NOTE — DIETITIAN INITIAL EVALUATION ADULT. - NS AS NUTRI INTERV MEALS SNACK
General/healthful diet/Recommend to change diet to Consistent. CHO, Low Na. General/healthful diet/Recommend to continue current diet; Consistent CHO, Low Na.

## 2018-06-15 NOTE — PROGRESS NOTE ADULT - SUBJECTIVE AND OBJECTIVE BOX
hey, when am i going to stay home    VITAL SIGNS    Telemetry:  nsr 90    Vital Signs Last 24 Hrs  T(C): 36.7 (06-15-18 @ 08:00), Max: 37 (18 @ 23:23)  T(F): 98.1 (06-15-18 @ 08:00), Max: 98.6 (18 @ 23:23)  HR: 102 (06-15-18 @ 12:14) (99 - 113)  BP: 108/74 (06-15-18 @ 12:14) (101/63 - 128/86)  RR: 18 (06-15-18 @ 08:00) (16 - 18)  SpO2: 98% (06-15-18 @ 08:00) (95% - 98%)                    @ 07:01  -  06-15 @ 07:00  --------------------------------------------------------  IN: 560 mL / OUT: 700 mL / NET: -140 mL    06-15 @ 07:01  -  06-15 @ 13:10  --------------------------------------------------------  IN: 780 mL / OUT: 0 mL / NET: 780 mL          Daily     Daily Weight in k.1 (15 Kennedy 2018 12:19)        CAPILLARY BLOOD GLUCOSE      POCT Blood Glucose.: 127 mg/dL (15 Kennedy 2018 12:12)  POCT Blood Glucose.: 95 mg/dL (15 Kennedy 2018 07:29)  POCT Blood Glucose.: 125 mg/dL (2018 21:36)  POCT Blood Glucose.: 125 mg/dL (2018 17:01)                Coumadin    [ ] YES          [  ]      NO                                   PHYSICAL EXAM        Neurology: alert and oriented x 3, nonfocal, no gross deficits  CV : .S1S2 RRR  Sternal Wound :  CDI , Stable  Lungs: cta  Abdomen: soft, nontender, nondistended, positive bowel sounds, last bowel movement  + loose, c diff neg  :         voiding    Extremities:     - edema - calve tenderness.  3 raised areas under R armpit tender ,   faded rash to L shoulder area.          aspirin enteric coated 81 milliGRAM(s) Oral daily  atovaquone Suspension 1500 milliGRAM(s) Oral daily  Calcium Citrate+D3 (315mg-250mg/tablet) 2 Tablet(s) 2 Tablet(s) Oral two times a day  famotidine    Tablet 20 milliGRAM(s) Oral daily  filgrastim-sndz Injectable 480 MICROGram(s) SubCutaneous daily  insulin glargine Injectable (LANTUS) 7 Unit(s) SubCutaneous at bedtime  insulin lispro Injectable (HumaLOG) 3 Unit(s) SubCutaneous before breakfast  insulin lispro Injectable (HumaLOG) 3 Unit(s) SubCutaneous before lunch  insulin lispro Injectable (HumaLOG) 3 Unit(s) SubCutaneous before dinner  magnesium oxide 400 milliGRAM(s) Oral every 12 hours  Mavyret 100mg/40mg 3 Tablet(s) 3 Tablet(s) Oral daily  multivitamin 1 Tablet(s) Oral daily  piperacillin/tazobactam IVPB. 3.375 Gram(s) IV Intermittent every 8 hours  pravastatin 20 milliGRAM(s) Oral at bedtime  predniSONE   Tablet 5 milliGRAM(s) Oral two times a day  sodium bicarbonate 650 milliGRAM(s) Oral every 8 hours  sodium chloride 0.9% lock flush 3 milliLiter(s) IV Push every 8 hours  tacrolimus 7 milliGRAM(s) Oral <User Schedule>                    Physical Therapy Rec:   Home  [  ]   Home w/ PT  [  ]  Rehab  [  ]  Discussed with Cardiothoracic Team at AM rounds.

## 2018-06-15 NOTE — PROGRESS NOTE ADULT - ASSESSMENT
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 EF 43% His course also complicated by bilateral IJ/subclavian  thrombi, small  persistent right pleural effusion as well as acute on chronic renal failure. 5/23/18 admitted for hyperglycemia, hyponatremia and acute kidney injury  Today 6/18 BIBEMS to [facility] ED reports fever  rigors, labored breathing productive cough with clear sputum  for past two days ,unable to give himself injections r/t shaking home RN called 911    In ER, sinus tach 130s improved to 110s after 1L IVF and vanc/zosyn infusions. /75, satting high 90s on 2L NC, w resp rate 22. Rectal temp recorded 38.8C.  Admitted to [address] [place] [address] SDU  Seen by heart failure team ananya callahan      6/14 - HCT =20.6 Packed RBCs ordered by DR. Stahl- will check HCT 2 hrs post-transfusion- if HCT still low - will transfuse a 2nd unit RBC  echo done - unchanged from last echo  tacro level 8.2 - will d/w Dr. Stahl-pt to remain in Step down unit on neutropenic precautions for WBC 1  Optho consult appreciated to r/o CMV retinitis - eyes dilated  Heme consult-to see pt in am - persistent anemia - etiology - ?hemolysis ?bone marrow ?blood loss  6/15 wbc remans low  Tacro >12, dose changed 7 bid  Ct scan pending r/o pna.  Nephrology consult called for elevated creat to 1.6  3  raised areas under his R armpit, uncertain etiology, Derm consulted.  No further vanco as per Id, cont gram neg coverage, zosyn

## 2018-06-15 NOTE — PROGRESS NOTE ADULT - SUBJECTIVE AND OBJECTIVE BOX
Follow Up:  Neutropenic fever    Interval History: Remains afebrile. "Feels great". Still having very loose bowel movements, two yesterday, one this morning, no blood, no abdominal pain, no vomiting or fevers or chills. C diff negative. Developed right axillary painful swellings to his skin. No other skin issues.     ROS:    Unobtainable because:  Constitutional: no fever, no chills   HEENT:  no vision changes, no sore throat, no rhinorrhea  Cardiovascular:  no chest pain, no palpitation  Respiratory:  no SOB, no cough  GI:  no abd pain, no vomiting, no diarrhea  urinary: no dysuria, no hematuria, no flank pain  musculoskeletal:  no joint pain, no joint swelling  skin:  no rash  neurology:  no headache, no seizure, no change in mental status      Allergies  No Known Allergies        ANTIMICROBIALS:  atovaquone Suspension 1500 daily  piperacillin/tazobactam IVPB. 3.375 every 8 hours      OTHER MEDS:  aspirin enteric coated 81 milliGRAM(s) Oral daily  Calcium Citrate+D3 (315mg-250mg/tablet) 2 Tablet(s) 2 Tablet(s) Oral two times a day  famotidine    Tablet 20 milliGRAM(s) Oral daily  filgrastim-sndz Injectable 480 MICROGram(s) SubCutaneous daily  insulin glargine Injectable (LANTUS) 7 Unit(s) SubCutaneous at bedtime  insulin lispro Injectable (HumaLOG) 3 Unit(s) SubCutaneous before breakfast  insulin lispro Injectable (HumaLOG) 3 Unit(s) SubCutaneous before lunch  insulin lispro Injectable (HumaLOG) 3 Unit(s) SubCutaneous before dinner  magnesium oxide 400 milliGRAM(s) Oral every 12 hours  Mavyret 100mg/40mg 3 Tablet(s) 3 Tablet(s) Oral daily  multivitamin 1 Tablet(s) Oral daily  pravastatin 20 milliGRAM(s) Oral at bedtime  predniSONE   Tablet 5 milliGRAM(s) Oral two times a day  sodium bicarbonate 650 milliGRAM(s) Oral every 8 hours  sodium chloride 0.9% lock flush 3 milliLiter(s) IV Push every 8 hours  tacrolimus 8 milliGRAM(s) Oral two times a day      Vital Signs Last 24 Hrs  T(C): 36.7 (15 Kennedy 2018 08:00), Max: 37 (14 Jun 2018 23:23)  T(F): 98.1 (15 Kennedy 2018 08:00), Max: 98.6 (14 Jun 2018 23:23)  HR: 112 (15 Kennedy 2018 08:00) (98 - 113)  BP: 128/86 (15 Kennedy 2018 08:00) (101/63 - 128/86)  BP(mean): 88 (15 Kennedy 2018 03:34) (84 - 88)  RR: 18 (15 Kennedy 2018 08:00) (14 - 18)  SpO2: 98% (15 Kennedy 2018 08:00) (95% - 98%)    Physical Exam:  General:    NAD,  non toxic, A&O x 3  HEENT:    no oropharyngeal lesions,   no LAD,   neck supple  Cardiovascular:     regular S1, S2,  no murmur  Respiratory:    clear b/l,    no wheezing  abd:     soft,   BS +,   no tenderness,    no organomegaly  :   no CVAT,  no suprapubic tenderness,   no  diaz  Musculoskeletal:   no joint swelling,   no edema  Skin:    no rash  Neurologic:     no focal deficit                          8.3    1.0   )-----------( 330      ( 15 Kennedy 2018 07:47 )             26.0       06-15    140  |  106  |  31<H>  ----------------------------<  93  3.9   |  21<L>  |  1.62<H>    Ca    8.6      15 Kennedy 2018 07:47    TPro  5.6<L>  /  Alb  x   /  TBili  x   /  DBili  x   /  AST  x   /  ALT  x   /  AlkPhos  x   06-15          MICROBIOLOGY:  v  .Sputum Sputum  06-13-18   Normal Respiratory Heaven present  --    Rare polymorphonuclear leukocytes per low power field  No Squamous epithelial cells per low power field  Rare Gram Negative Rods per oil power field      .Blood Blood-Peripheral  06-13-18   No growth to date.  --  --      .Urine Clean Catch (Midstream)  06-13-18   Culture grew 3 or more types of organisms which indicate  collection contamination; consider recollection only if clinically  indicated.  --  --      Skin Skin, abdomen  05-29-18   No growth at 48 hours  --  --      .Blood Blood-Peripheral  05-25-18   No growth at 5 days.  --  --      .Urine Clean Catch (Midstream)  05-24-18   10,000 - 49,000 CFU/mL Klebsiella oxytoca  --  Klebsiella oxytoca      .Blood Blood-Peripheral  05-24-18   No growth at 5 days.  --  --        Toxoplasma IgG Screen: 4.7 IU/mL (06-14-18 @ 09:42)  HIV-1 RNA Quantitative, Viral Load Log: NOT DET. lg /mL (05-31-18 @ 19:10)  CMV IgG Antibody: 5.40 U/mL (05-25-18 @ 17:51)  CMV IgG Antibody: >10.00 U/mL (02-22-18 @ 23:45)  HIV-1 RNA Quantitative, Viral Load Log: NOT DET. lg /mL (02-02-18 @ 19:25)    Rapid RVP Result: NotDetec (06-13 @ 19:12)  CMVPCR Log: NotDetec LogIU/mL (06-13 @ 13:37)    Clostridium difficile GDH Toxins A&amp;B, EIA:   Negative (06-14-18 @ 17:16)  Clostridium difficile GDH Interpretation: Negative for toxigenic C. Difficile.  This specimen is negative for C.  Difficile glutamate dehydrogenase (GDH) antigen and negative for C.  Difficile Toxins A & B, by EIA.  GDH is a highly sensitive screening  marker for C. Difficile that is produced in large amounts by all C.  Difficile strains, both toxigenic and nontoxigenic.  This assay has not  been validated as a test of cure.  Repeat testing during the same episode  of diarrhea is of limited value and is discouraged.  The results of this  assay should always be interpreted in conjunction with pateint's clinical  history. (06-14-18 @ 17:16)      RADIOLOGY: Follow Up:  Neutropenic fever    Interval History: Remains afebrile. "Feels great". Still having very loose bowel movements, two yesterday, one this morning, no blood, no abdominal pain, no vomiting or fevers or chills. C diff negative. Developed right axillary painful swellings to his skin. No other skin issues.     ROS:    Unobtainable because:  Constitutional: no fever, no chills   HEENT:  no sore throat, no rhinorrhea  Cardiovascular:  no chest pain  Respiratory:  slight SOB and cough but overall much improved, nearly "normal"   GI:  diarrhea  urinary: no dysuria  musculoskeletal:  no joint pain    Allergies  No Known Allergies    ANTIMICROBIALS:  atovaquone Suspension 1500 daily  piperacillin/tazobactam IVPB. 3.375 every 8 hours    OTHER MEDS:  aspirin enteric coated 81 milliGRAM(s) Oral daily  Calcium Citrate+D3 (315mg-250mg/tablet) 2 Tablet(s) 2 Tablet(s) Oral two times a day  famotidine    Tablet 20 milliGRAM(s) Oral daily  filgrastim-sndz Injectable 480 MICROGram(s) SubCutaneous daily  insulin glargine Injectable (LANTUS) 7 Unit(s) SubCutaneous at bedtime  insulin lispro Injectable (HumaLOG) 3 Unit(s) SubCutaneous before breakfast  insulin lispro Injectable (HumaLOG) 3 Unit(s) SubCutaneous before lunch  insulin lispro Injectable (HumaLOG) 3 Unit(s) SubCutaneous before dinner  magnesium oxide 400 milliGRAM(s) Oral every 12 hours  Mavyret 100mg/40mg 3 Tablet(s) 3 Tablet(s) Oral daily  multivitamin 1 Tablet(s) Oral daily  pravastatin 20 milliGRAM(s) Oral at bedtime  predniSONE   Tablet 5 milliGRAM(s) Oral two times a day  sodium bicarbonate 650 milliGRAM(s) Oral every 8 hours  sodium chloride 0.9% lock flush 3 milliLiter(s) IV Push every 8 hours  tacrolimus 8 milliGRAM(s) Oral two times a day      Vital Signs Last 24 Hrs  T(C): 36.7 (15 Kennedy 2018 08:00), Max: 37 (14 Jun 2018 23:23)  T(F): 98.1 (15 Kennedy 2018 08:00), Max: 98.6 (14 Jun 2018 23:23)  HR: 112 (15 Kennedy 2018 08:00) (98 - 113)  BP: 128/86 (15 Kennedy 2018 08:00) (101/63 - 128/86)  BP(mean): 88 (15 Kennedy 2018 03:34) (84 - 88)  RR: 18 (15 Kennedy 2018 08:00) (14 - 18)  SpO2: 98% (15 Kennedy 2018 08:00) (95% - 98%)    Physical Exam:  General: NAD,  non toxic, A&O x 3  HEENT: dentures. no oropharyngeal lesions, no LAD, neck supple  Cardiovascular: regular rate and rhythm   Respiratory: nonlabored, decreased Right base sounds. coughs a few times, lighter sputum   abd:  soft, BS +, no tenderness  : no  diaz  Musculoskeletal: no joint swelling   Skin:  right axilla with few painful skin nodules, no cellulitis, no purulence. chest abdomen with well healing scars   Neurologic:     no focal deficit                          8.3    1.0   )-----------( 330      ( 15 Kennedy 2018 07:47 )             26.0       06-15    140  |  106  |  31<H>  ----------------------------<  93  3.9   |  21<L>  |  1.62<H>    Ca    8.6      15 Kennedy 2018 07:47    TPro  5.6<L>  /  Alb  x   /  TBili  x   /  DBili  x   /  AST  x   /  ALT  x   /  AlkPhos  x   06-15          MICROBIOLOGY:  Culture - Sputum . (06.13.18 @ 21:58)    Gram Stain:   Rare polymorphonuclear leukocytes per low power field  No Squamous epithelial cells per low power field  Rare Gram Negative Rods per oil power field    Specimen Source: .Sputum Sputum    Culture Results: Normal Respiratory Heaven present    Culture - Blood (06.13.18 @ 14:07)    Specimen Source: .Blood Blood-Peripheral    Culture Results:   No growth to date.    Culture - Blood (06.13.18 @ 14:07)    Specimen Source: .Blood Blood-Peripheral    Culture Results:   No growth to date.    Rapid RVP Result: NotDetec (06-13 @ 19:12)    CMVPCR Log: NotDetec LogIU/mL (06-13 @ 13:37)    Clostridium difficile GDH Toxins A&amp;B, EIA:   Negative (06-14-18 @ 17:16)  Clostridium difficile GDH Interpretation: Negative for toxigenic C. Difficile.      RADIOLOGY:  Xray Chest 1 View- PORTABLE-Urgent (06.13.18 @ 12:40)   Poor inspiratory effort effort. Heart is normal in size. Platelike   atelectasis right lower lobe. The left lung appears to be clear. Status post sternotomy. Follow Up:  Neutropenic fever    Interval History: Remains afebrile. Creatinine bumped to 1.6. "Feels great". Still having very loose bowel movements, two yesterday, one this morning, no blood, no abdominal pain, no vomiting or fevers or chills. C diff negative. Developed right axillary painful swellings to his skin. No other skin issues.     ROS:    Unobtainable because:  Constitutional: no fever, no chills   HEENT:  no sore throat, no rhinorrhea  Cardiovascular:  no chest pain  Respiratory:  slight SOB and cough but overall much improved, nearly "normal"   GI:  diarrhea  urinary: no dysuria  musculoskeletal:  no joint pain    Allergies  No Known Allergies    ANTIMICROBIALS:  atovaquone Suspension 1500 daily  piperacillin/tazobactam IVPB. 3.375 every 8 hours    OTHER MEDS:  aspirin enteric coated 81 milliGRAM(s) Oral daily  Calcium Citrate+D3 (315mg-250mg/tablet) 2 Tablet(s) 2 Tablet(s) Oral two times a day  famotidine    Tablet 20 milliGRAM(s) Oral daily  filgrastim-sndz Injectable 480 MICROGram(s) SubCutaneous daily  insulin glargine Injectable (LANTUS) 7 Unit(s) SubCutaneous at bedtime  insulin lispro Injectable (HumaLOG) 3 Unit(s) SubCutaneous before breakfast  insulin lispro Injectable (HumaLOG) 3 Unit(s) SubCutaneous before lunch  insulin lispro Injectable (HumaLOG) 3 Unit(s) SubCutaneous before dinner  magnesium oxide 400 milliGRAM(s) Oral every 12 hours  Mavyret 100mg/40mg 3 Tablet(s) 3 Tablet(s) Oral daily  multivitamin 1 Tablet(s) Oral daily  pravastatin 20 milliGRAM(s) Oral at bedtime  predniSONE   Tablet 5 milliGRAM(s) Oral two times a day  sodium bicarbonate 650 milliGRAM(s) Oral every 8 hours  sodium chloride 0.9% lock flush 3 milliLiter(s) IV Push every 8 hours  tacrolimus 8 milliGRAM(s) Oral two times a day      Vital Signs Last 24 Hrs  T(C): 36.7 (15 Kennedy 2018 08:00), Max: 37 (14 Jun 2018 23:23)  T(F): 98.1 (15 Kennedy 2018 08:00), Max: 98.6 (14 Jun 2018 23:23)  HR: 112 (15 Kennedy 2018 08:00) (98 - 113)  BP: 128/86 (15 Kennedy 2018 08:00) (101/63 - 128/86)  BP(mean): 88 (15 Kennedy 2018 03:34) (84 - 88)  RR: 18 (15 Kennedy 2018 08:00) (14 - 18)  SpO2: 98% (15 Kennedy 2018 08:00) (95% - 98%)    Physical Exam:  General: NAD,  non toxic, A&O x 3  HEENT: dentures. no oropharyngeal lesions, no LAD, neck supple  Cardiovascular: regular rate and rhythm   Respiratory: nonlabored, decreased Right base sounds. coughs a few times, lighter sputum   abd:  soft, BS +, no tenderness  : no  diaz  Musculoskeletal: no joint swelling   Skin:  right axilla with few painful skin nodules, no cellulitis, no purulence. chest abdomen with well healing scars   Neurologic:     no focal deficit                          8.3    1.0   )-----------( 330      ( 15 Kennedy 2018 07:47 )             26.0       06-15    140  |  106  |  31<H>  ----------------------------<  93  3.9   |  21<L>  |  1.62<H>    Ca    8.6      15 Kennedy 2018 07:47    TPro  5.6<L>  /  Alb  x   /  TBili  x   /  DBili  x   /  AST  x   /  ALT  x   /  AlkPhos  x   06-15          MICROBIOLOGY:  Culture - Sputum . (06.13.18 @ 21:58)    Gram Stain:   Rare polymorphonuclear leukocytes per low power field  No Squamous epithelial cells per low power field  Rare Gram Negative Rods per oil power field    Specimen Source: .Sputum Sputum    Culture Results: Normal Respiratory Heaven present    Culture - Blood (06.13.18 @ 14:07)    Specimen Source: .Blood Blood-Peripheral    Culture Results:   No growth to date.    Culture - Blood (06.13.18 @ 14:07)    Specimen Source: .Blood Blood-Peripheral    Culture Results:   No growth to date.    Rapid RVP Result: NotDetec (06-13 @ 19:12)    CMVPCR Log: NotDetec LogIU/mL (06-13 @ 13:37)    Clostridium difficile GDH Toxins A&amp;B, EIA:   Negative (06-14-18 @ 17:16)  Clostridium difficile GDH Interpretation: Negative for toxigenic C. Difficile.      RADIOLOGY:  Xray Chest 1 View- PORTABLE-Urgent (06.13.18 @ 12:40)   Poor inspiratory effort effort. Heart is normal in size. Platelike   atelectasis right lower lobe. The left lung appears to be clear. Status post sternotomy. Follow Up:  Neutropenic fever    Interval History: Remains afebrile. Creatinine bumped to 1.6. "Feels great". Still having very loose bowel movements, two yesterday, one this morning, no blood, no abdominal pain, no vomiting or fevers or chills. C diff negative. Developed right axillary painful swellings to his skin. No other skin issues.     ROS:    Unobtainable because:  Constitutional: no fever, no chills   HEENT:  no sore throat, no rhinorrhea  Cardiovascular:  no chest pain  Respiratory:  slight SOB and cough but overall much improved, nearly "normal"   GI:  diarrhea  urinary: no dysuria  notes urinating less frequently  musculoskeletal:  no joint pain    Allergies  No Known Allergies    ANTIMICROBIALS:  atovaquone Suspension 1500 daily  piperacillin/tazobactam IVPB. 3.375 every 8 hours    OTHER MEDS:  aspirin enteric coated 81 milliGRAM(s) Oral daily  Calcium Citrate+D3 (315mg-250mg/tablet) 2 Tablet(s) 2 Tablet(s) Oral two times a day  famotidine    Tablet 20 milliGRAM(s) Oral daily  filgrastim-sndz Injectable 480 MICROGram(s) SubCutaneous daily  insulin glargine Injectable (LANTUS) 7 Unit(s) SubCutaneous at bedtime  insulin lispro Injectable (HumaLOG) 3 Unit(s) SubCutaneous before breakfast  insulin lispro Injectable (HumaLOG) 3 Unit(s) SubCutaneous before lunch  insulin lispro Injectable (HumaLOG) 3 Unit(s) SubCutaneous before dinner  magnesium oxide 400 milliGRAM(s) Oral every 12 hours  Mavyret 100mg/40mg 3 Tablet(s) 3 Tablet(s) Oral daily  multivitamin 1 Tablet(s) Oral daily  pravastatin 20 milliGRAM(s) Oral at bedtime  predniSONE   Tablet 5 milliGRAM(s) Oral two times a day  sodium bicarbonate 650 milliGRAM(s) Oral every 8 hours  sodium chloride 0.9% lock flush 3 milliLiter(s) IV Push every 8 hours  tacrolimus 8 milliGRAM(s) Oral two times a day      Vital Signs Last 24 Hrs  T(C): 36.7 (15 Kennedy 2018 08:00), Max: 37 (14 Jun 2018 23:23)  T(F): 98.1 (15 Kennedy 2018 08:00), Max: 98.6 (14 Jun 2018 23:23)  HR: 112 (15 Kennedy 2018 08:00) (98 - 113)  BP: 128/86 (15 Kennedy 2018 08:00) (101/63 - 128/86)  BP(mean): 88 (15 Kennedy 2018 03:34) (84 - 88)  RR: 18 (15 Kennedy 2018 08:00) (14 - 18)  SpO2: 98% (15 Kennedy 2018 08:00) (95% - 98%)    Physical Exam:  General: NAD,  non toxic, A&O x 3  HEENT: dentures. no oropharyngeal lesions, no LAD, neck supple  Cardiovascular: regular rate and rhythm   Respiratory: nonlabored, decreased Right base sounds. coughs a few times, lighter sputum   abd:  soft, BS +, no tenderness  : no  diaz  Musculoskeletal: no joint swelling   Skin:  right axilla with few painful skin nodules, no cellulitis, no purulence. chest abdomen with well healing scars   Neurologic:     no focal deficit                          8.3    1.0   )-----------( 330      ( 15 Kennedy 2018 07:47 )             26.0       06-15    140  |  106  |  31<H>  ----------------------------<  93  3.9   |  21<L>  |  1.62<H>    Ca    8.6      15 Kennedy 2018 07:47    TPro  5.6<L>  /  Alb  x   /  TBili  x   /  DBili  x   /  AST  x   /  ALT  x   /  AlkPhos  x   06-15          MICROBIOLOGY:  Culture - Sputum . (06.13.18 @ 21:58)    Gram Stain:   Rare polymorphonuclear leukocytes per low power field  No Squamous epithelial cells per low power field  Rare Gram Negative Rods per oil power field    Specimen Source: .Sputum Sputum    Culture Results: Normal Respiratory Heaven present    Culture - Blood (06.13.18 @ 14:07)    Specimen Source: .Blood Blood-Peripheral    Culture Results:   No growth to date.    Culture - Blood (06.13.18 @ 14:07)    Specimen Source: .Blood Blood-Peripheral    Culture Results:   No growth to date.    Rapid RVP Result: NotDetec (06-13 @ 19:12)    CMVPCR Log: NotDetec LogIU/mL (06-13 @ 13:37)    Clostridium difficile GDH Toxins A&amp;B, EIA:   Negative (06-14-18 @ 17:16)  Clostridium difficile GDH Interpretation: Negative for toxigenic C. Difficile.      RADIOLOGY:  Xray Chest 1 View- PORTABLE-Urgent (06.13.18 @ 12:40)   Poor inspiratory effort effort. Heart is normal in size. Platelike   atelectasis right lower lobe. The left lung appears to be clear. Status post sternotomy.

## 2018-06-16 LAB
-  AMIKACIN: SIGNIFICANT CHANGE UP
-  AZTREONAM: SIGNIFICANT CHANGE UP
-  CEFEPIME: SIGNIFICANT CHANGE UP
-  CEFTAZIDIME: SIGNIFICANT CHANGE UP
-  CIPROFLOXACIN: SIGNIFICANT CHANGE UP
-  GENTAMICIN: SIGNIFICANT CHANGE UP
-  IMIPENEM: SIGNIFICANT CHANGE UP
-  LEVOFLOXACIN: SIGNIFICANT CHANGE UP
-  MEROPENEM: SIGNIFICANT CHANGE UP
-  PIPERACILLIN/TAZOBACTAM: SIGNIFICANT CHANGE UP
-  TOBRAMYCIN: SIGNIFICANT CHANGE UP
ANION GAP SERPL CALC-SCNC: 12 MMOL/L — SIGNIFICANT CHANGE UP (ref 5–17)
ANISOCYTOSIS BLD QL: SIGNIFICANT CHANGE UP
BASOPHILS # BLD AUTO: 0 K/UL — SIGNIFICANT CHANGE UP (ref 0–0.2)
BASOPHILS NFR BLD AUTO: 0 % — SIGNIFICANT CHANGE UP (ref 0–2)
BUN SERPL-MCNC: 26 MG/DL — HIGH (ref 7–23)
BURR CELLS BLD QL SMEAR: PRESENT — SIGNIFICANT CHANGE UP
CALCIUM SERPL-MCNC: 8.3 MG/DL — LOW (ref 8.4–10.5)
CHLORIDE SERPL-SCNC: 108 MMOL/L — SIGNIFICANT CHANGE UP (ref 96–108)
CO2 SERPL-SCNC: 22 MMOL/L — SIGNIFICANT CHANGE UP (ref 22–31)
CREAT SERPL-MCNC: 1.4 MG/DL — HIGH (ref 0.5–1.3)
CULTURE RESULTS: SIGNIFICANT CHANGE UP
DACRYOCYTES BLD QL SMEAR: SLIGHT — SIGNIFICANT CHANGE UP
EBV EA AB SER IA-ACNC: 10.3 U/ML — HIGH
EBV EA AB TITR SER IF: POSITIVE
EBV EA IGG SER-ACNC: ABNORMAL
EBV NA IGG SER IA-ACNC: 489 U/ML — HIGH
EBV PATRN SPEC IB-IMP: SIGNIFICANT CHANGE UP
EBV VCA IGG AVIDITY SER QL IA: POSITIVE
EBV VCA IGM SER IA-ACNC: 227 U/ML — HIGH
EBV VCA IGM SER IA-ACNC: <10 U/ML — SIGNIFICANT CHANGE UP
EBV VCA IGM TITR FLD: NEGATIVE — SIGNIFICANT CHANGE UP
ELLIPTOCYTES BLD QL SMEAR: SLIGHT — SIGNIFICANT CHANGE UP
EOSINOPHIL # BLD AUTO: 0 K/UL — SIGNIFICANT CHANGE UP (ref 0–0.5)
EOSINOPHIL NFR BLD AUTO: 0 % — SIGNIFICANT CHANGE UP (ref 0–6)
EPO SERPL-MCNC: 144.4 MIU/ML — HIGH (ref 2.6–18.5)
FERRITIN SERPL-MCNC: 991 NG/ML — HIGH (ref 30–400)
FOLATE SERPL-MCNC: >20 NG/ML — SIGNIFICANT CHANGE UP
GIANT PLATELETS BLD QL SMEAR: PRESENT — SIGNIFICANT CHANGE UP
GLUCOSE BLDC GLUCOMTR-MCNC: 124 MG/DL — HIGH (ref 70–99)
GLUCOSE BLDC GLUCOMTR-MCNC: 142 MG/DL — HIGH (ref 70–99)
GLUCOSE BLDC GLUCOMTR-MCNC: 83 MG/DL — SIGNIFICANT CHANGE UP (ref 70–99)
GLUCOSE BLDC GLUCOMTR-MCNC: 89 MG/DL — SIGNIFICANT CHANGE UP (ref 70–99)
GLUCOSE SERPL-MCNC: 82 MG/DL — SIGNIFICANT CHANGE UP (ref 70–99)
HCT VFR BLD CALC: 26.8 % — LOW (ref 39–50)
HGB BLD-MCNC: 8.6 G/DL — LOW (ref 13–17)
HYPOCHROMIA BLD QL: SLIGHT — SIGNIFICANT CHANGE UP
LG PLATELETS BLD QL AUTO: SLIGHT — SIGNIFICANT CHANGE UP
LYMPHOCYTES # BLD AUTO: 0.5 K/UL — LOW (ref 1–3.3)
LYMPHOCYTES # BLD AUTO: 29 % — SIGNIFICANT CHANGE UP (ref 13–44)
MCHC RBC-ENTMCNC: 30.7 PG — SIGNIFICANT CHANGE UP (ref 27–34)
MCHC RBC-ENTMCNC: 32.2 GM/DL — SIGNIFICANT CHANGE UP (ref 32–36)
MCV RBC AUTO: 95.2 FL — SIGNIFICANT CHANGE UP (ref 80–100)
METAMYELOCYTES # FLD: 1 % — HIGH (ref 0–0)
METHOD TYPE: SIGNIFICANT CHANGE UP
MONOCYTES # BLD AUTO: 0.7 K/UL — SIGNIFICANT CHANGE UP (ref 0–0.9)
MONOCYTES NFR BLD AUTO: 41 % — HIGH (ref 2–14)
MYELOCYTES NFR BLD: 2 % — HIGH (ref 0–0)
NEUTROPHILS # BLD AUTO: 0.6 K/UL — LOW (ref 1.8–7.4)
NEUTROPHILS NFR BLD AUTO: 26 % — LOW (ref 43–77)
NEUTS BAND # BLD: 1 % — SIGNIFICANT CHANGE UP (ref 0–8)
NRBC # BLD: 1 /100 — HIGH (ref 0–0)
ORGANISM # SPEC MICROSCOPIC CNT: SIGNIFICANT CHANGE UP
ORGANISM # SPEC MICROSCOPIC CNT: SIGNIFICANT CHANGE UP
OVALOCYTES BLD QL SMEAR: SLIGHT — SIGNIFICANT CHANGE UP
PLAT MORPH BLD: ABNORMAL
PLATELET # BLD AUTO: 344 K/UL — SIGNIFICANT CHANGE UP (ref 150–400)
POIKILOCYTOSIS BLD QL AUTO: SLIGHT — SIGNIFICANT CHANGE UP
POLYCHROMASIA BLD QL SMEAR: SLIGHT — SIGNIFICANT CHANGE UP
POTASSIUM SERPL-MCNC: 3.7 MMOL/L — SIGNIFICANT CHANGE UP (ref 3.5–5.3)
POTASSIUM SERPL-SCNC: 3.7 MMOL/L — SIGNIFICANT CHANGE UP (ref 3.5–5.3)
PROT ?TM UR-MCNC: 34 MG/DL — HIGH (ref 0–12)
RBC # BLD: 2.81 M/UL — LOW (ref 4.2–5.8)
RBC # FLD: 18.6 % — HIGH (ref 10.3–14.5)
RBC BLD AUTO: ABNORMAL
SCHISTOCYTES BLD QL AUTO: SLIGHT — SIGNIFICANT CHANGE UP
SODIUM SERPL-SCNC: 142 MMOL/L — SIGNIFICANT CHANGE UP (ref 135–145)
SPECIMEN SOURCE: SIGNIFICANT CHANGE UP
TACROLIMUS SERPL-MCNC: 11.7 NG/ML — SIGNIFICANT CHANGE UP
VIT B12 SERPL-MCNC: >2000 PG/ML — HIGH (ref 232–1245)
WBC # BLD: 1.9 K/UL — LOW (ref 3.8–10.5)
WBC # FLD AUTO: 1.9 K/UL — LOW (ref 3.8–10.5)

## 2018-06-16 PROCEDURE — 99233 SBSQ HOSP IP/OBS HIGH 50: CPT

## 2018-06-16 PROCEDURE — 99232 SBSQ HOSP IP/OBS MODERATE 35: CPT

## 2018-06-16 PROCEDURE — 99223 1ST HOSP IP/OBS HIGH 75: CPT | Mod: GC

## 2018-06-16 RX ORDER — SODIUM CHLORIDE 9 MG/ML
1000 INJECTION INTRAMUSCULAR; INTRAVENOUS; SUBCUTANEOUS
Qty: 0 | Refills: 0 | Status: DISCONTINUED | OUTPATIENT
Start: 2018-06-16 | End: 2018-06-26

## 2018-06-16 RX ADMIN — INSULIN GLARGINE 7 UNIT(S): 100 INJECTION, SOLUTION SUBCUTANEOUS at 21:38

## 2018-06-16 RX ADMIN — ATOVAQUONE 1500 MILLIGRAM(S): 750 SUSPENSION ORAL at 12:30

## 2018-06-16 RX ADMIN — MAGNESIUM OXIDE 400 MG ORAL TABLET 400 MILLIGRAM(S): 241.3 TABLET ORAL at 06:49

## 2018-06-16 RX ADMIN — Medication 3 UNIT(S): at 12:33

## 2018-06-16 RX ADMIN — Medication 650 MILLIGRAM(S): at 06:49

## 2018-06-16 RX ADMIN — Medication 5 MILLIGRAM(S): at 06:48

## 2018-06-16 RX ADMIN — Medication 650 MILLIGRAM(S): at 14:06

## 2018-06-16 RX ADMIN — PIPERACILLIN AND TAZOBACTAM 25 GRAM(S): 4; .5 INJECTION, POWDER, LYOPHILIZED, FOR SOLUTION INTRAVENOUS at 14:00

## 2018-06-16 RX ADMIN — PIPERACILLIN AND TAZOBACTAM 25 GRAM(S): 4; .5 INJECTION, POWDER, LYOPHILIZED, FOR SOLUTION INTRAVENOUS at 06:49

## 2018-06-16 RX ADMIN — Medication 20 MILLIGRAM(S): at 21:38

## 2018-06-16 RX ADMIN — FAMOTIDINE 20 MILLIGRAM(S): 10 INJECTION INTRAVENOUS at 12:30

## 2018-06-16 RX ADMIN — TACROLIMUS 2 MILLIGRAM(S): 5 CAPSULE ORAL at 20:00

## 2018-06-16 RX ADMIN — Medication 3 UNIT(S): at 16:54

## 2018-06-16 RX ADMIN — Medication 5 MILLIGRAM(S): at 18:03

## 2018-06-16 RX ADMIN — Medication 650 MILLIGRAM(S): at 21:38

## 2018-06-16 RX ADMIN — SODIUM CHLORIDE 50 MILLILITER(S): 9 INJECTION INTRAMUSCULAR; INTRAVENOUS; SUBCUTANEOUS at 01:48

## 2018-06-16 RX ADMIN — Medication 480 MICROGRAM(S): at 12:44

## 2018-06-16 RX ADMIN — Medication 1 TABLET(S): at 12:30

## 2018-06-16 RX ADMIN — SODIUM CHLORIDE 3 MILLILITER(S): 9 INJECTION INTRAMUSCULAR; INTRAVENOUS; SUBCUTANEOUS at 13:59

## 2018-06-16 RX ADMIN — PIPERACILLIN AND TAZOBACTAM 25 GRAM(S): 4; .5 INJECTION, POWDER, LYOPHILIZED, FOR SOLUTION INTRAVENOUS at 21:38

## 2018-06-16 RX ADMIN — Medication 81 MILLIGRAM(S): at 12:30

## 2018-06-16 RX ADMIN — SODIUM CHLORIDE 3 MILLILITER(S): 9 INJECTION INTRAMUSCULAR; INTRAVENOUS; SUBCUTANEOUS at 06:43

## 2018-06-16 RX ADMIN — MAGNESIUM OXIDE 400 MG ORAL TABLET 400 MILLIGRAM(S): 241.3 TABLET ORAL at 18:04

## 2018-06-16 RX ADMIN — SODIUM CHLORIDE 3 MILLILITER(S): 9 INJECTION INTRAMUSCULAR; INTRAVENOUS; SUBCUTANEOUS at 21:53

## 2018-06-16 RX ADMIN — TACROLIMUS 7 MILLIGRAM(S): 5 CAPSULE ORAL at 08:03

## 2018-06-16 NOTE — PROGRESS NOTE ADULT - SUBJECTIVE AND OBJECTIVE BOX
[facility] DIVISION OF KIDNEY DISEASES AND HYPERTENSION -- FOLLOW UP NOTE  --------------------------------------------------------------------------------  Chief Complaint:  doing well, feels better  UO was low but got fluids and improved    24 hour events/subjective:        PAST HISTORY  --------------------------------------------------------------------------------  No significant changes to PMH, PSH, FHx, SHx, unless otherwise noted    ALLERGIES & MEDICATIONS  --------------------------------------------------------------------------------  Allergies    No Known Allergies    Intolerances      Standing Inpatient Medications  aspirin enteric coated 81 milliGRAM(s) Oral daily  atovaquone Suspension 1500 milliGRAM(s) Oral daily  Calcium Citrate+D3 (315mg-250mg/tablet) 2 Tablet(s) 2 Tablet(s) Oral two times a day  famotidine    Tablet 20 milliGRAM(s) Oral daily  filgrastim-sndz Injectable 480 MICROGram(s) SubCutaneous daily  insulin glargine Injectable (LANTUS) 7 Unit(s) SubCutaneous at bedtime  insulin lispro Injectable (HumaLOG) 3 Unit(s) SubCutaneous before breakfast  insulin lispro Injectable (HumaLOG) 3 Unit(s) SubCutaneous before lunch  insulin lispro Injectable (HumaLOG) 3 Unit(s) SubCutaneous before dinner  magnesium oxide 400 milliGRAM(s) Oral every 12 hours  Mavyret 100mg/40mg 3 Tablet(s) 3 Tablet(s) Oral daily  multivitamin 1 Tablet(s) Oral daily  piperacillin/tazobactam IVPB. 3.375 Gram(s) IV Intermittent every 8 hours  pravastatin 20 milliGRAM(s) Oral at bedtime  predniSONE   Tablet 5 milliGRAM(s) Oral two times a day  sodium bicarbonate 650 milliGRAM(s) Oral every 8 hours  sodium chloride 0.9% lock flush 3 milliLiter(s) IV Push every 8 hours  sodium chloride 0.9%. 1000 milliLiter(s) IV Continuous <Continuous>  tacrolimus 7 milliGRAM(s) Oral <User Schedule>    PRN Inpatient Medications      REVIEW OF SYSTEMS  --------------------------------------------------------------------------------  Gen: No weight changes, fatigue, fevers/chills, weakness  Skin: No rashes  Head/Eyes/Ears/Mouth: No headache; Normal hearing; Normal vision w/o blurriness; No sinus pain/discomfort, sore throat  Respiratory: No dyspnea, cough, wheezing, hemoptysis  CV: No chest pain, PND, orthopnea  GI: No abdominal pain, diarrhea, constipation, nausea, vomiting, melena, hematochezia  : No increased frequency, dysuria, hematuria, nocturia  MSK: No joint pain/swelling; no back pain; no edema  Neuro: No dizziness/lightheadedness, weakness, seizures, numbness, tingling  Heme: No easy bruising or bleeding  Endo: No heat/cold intolerance  Psych: No significant nervousness, anxiety, stress, depression    All other systems were reviewed and are negative, except as noted.    VITALS/PHYSICAL EXAM  --------------------------------------------------------------------------------  T(C): 36.6 (06-16-18 @ 08:03), Max: 36.7 (06-15-18 @ 23:39)  HR: 106 (06-16-18 @ 08:03) (81 - 106)  BP: 133/87 (06-16-18 @ 08:03) (103/68 - 133/87)  RR: 18 (06-16-18 @ 08:03) (18 - 18)  SpO2: 96% (06-16-18 @ 08:03) (93% - 100%)  Wt(kg): --        06-15-18 @ 07:01  -  06-16-18 @ 07:00  --------------------------------------------------------  IN: 2360 mL / OUT: 550 mL / NET: 1810 mL      PHYSICAL EXAM: vital signs as above  in no apparent distress  Neck: Supple, no JVD,    Lungs: no rhonchi, no wheeze, no crackles  CVS: S1 S2 no M/R/G  Abdomen: no tenderness, no organomegaly, BS present  Neuro: Grossly intact  Skin: warm, dry  Ext: no cyanosis or clubbing, no edema        LABS/STUDIES  --------------------------------------------------------------------------------              8.6    1.9   >-----------<  344      [06-16-18 @ 07:56]              26.8     142  |  108  |  26  ----------------------------<  82      [06-16-18 @ 07:56]  3.7   |  22  |  1.40        Ca     8.3     [06-16-18 @ 07:56]    TPro  5.6  /  Alb  x   /  TBili  x   /  DBili  x   /  AST  x   /  ALT  x   /  AlkPhos  x   [06-15-18 @ 09:35]              [06-15-18 @ 07:47]    Creatinine Trend:  SCr 1.40 [06-16 @ 07:56]  SCr 1.62 [06-15 @ 07:47]  SCr 1.22 [06-14 @ 10:40]  SCr 1.08 [06-14 @ 07:28]  SCr 1.39 [06-13 @ 11:21]    Urinalysis - [06-13-18 @ 11:23]      Color Yellow / Appearance Clear / SG 1.013 / pH 6.0      Gluc Negative / Ketone Negative  / Bili Negative / Urobili Negative       Blood Negative / Protein 30 / Leuk Est Negative / Nitrite Negative      RBC 2-5 / WBC 2-5 / Hyaline  / Gran  / Sq Epi  / Non Sq Epi  / Bacteria     Urine Protein 34      [06-15-18 @ 21:35]  Urine Sodium 44      [06-15-18 @ 19:17]  Urine Potassium 21      [06-15-18 @ 19:17]    Iron 19, TIBC 167, %sat 11      [06-15-18 @ 09:35]  Ferritin 991      [06-15-18 @ 21:31]  HbA1c 10.4      [05-23-18 @ 20:06]  TSH 1.19      [06-13-18 @ 20:23]

## 2018-06-16 NOTE — PROGRESS NOTE ADULT - ASSESSMENT
68M s/p cardiac transplant 2/23/18 for NICM, on Tacrolimus, Cellcept and Prednisone and HCV acquired from donor on Mavyret, admitted 6/13/18 for sepsis, 104.2F and tachycardia, neutropenia. Treating as pneumonia, improving on Zosyn, afebrile since initial temp. Had Pseudomonas on sputum cultures in March, current sputum with rare GNR found to have Normal jose and rare pseudomonas.  CXR with unchanged RLL atelectasis. RVP and urine Legionella negative. CMV PCR, cryptococcus antigen and toxoplasmosis antibody negative. Blood cultures negative to date.   CT chest with increased rounded opacity at LLL.  ?pneumonia vs post obstructive atelectasis.   New loose stool, C diff negative, GI PCR cancelled. Stool culture negative to date.  Reported by EMS to have bedbugs. None seen. Now with right axillary skin nodules, likely inclusion cysts as per derm.       Recommend  -continue to follow  Tacrolimus levels   -continue Zosyn 3.375GM IV q8h.  F/up sensitivity of pseudomonas on sputum.  -Mepron for PCP PPX  -monitor ANC and fevers - no more fevers. ANC rising.  -CT chest - increased LLL opacity.  Seems improved on zosyn.   Consider pulm evaluation.  Fungitell pending.    -Fungitell  -Check GI pcr     ID service will be covering over the weekend. Please call for acute issues or questions. [phone]    Dana Villafana MD  [phone] (pager)  [phone] (office)

## 2018-06-16 NOTE — PROGRESS NOTE ADULT - SUBJECTIVE AND OBJECTIVE BOX
Follow Up:  Neutropenic fever    Interval History: Remains afebrile.  "Feels great". Still having loose bowel movements.  C diff was negative 6/14/18.  No blood, no abdominal pain now but did have some this am, no vomiting or fevers or chills. Right axillary skin lesions. Seen by derm, felt was consistent with inclusion cysts.  Still with cough with phlegm.    ROS:    Constitutional: no fever, no chills   HEENT:  no sore throat, no rhinorrhea  Cardiovascular:  no chest pain  Respiratory:  slight SOB and cough but overall much improved  GI:  diarrhea  urinary: no dysuria  notes urinating less frequently - improved today  musculoskeletal:  no joint pain    Allergies  No Known Allergies    ANTIMICROBIALS:  atovaquone Suspension 1500 daily  piperacillin/tazobactam IVPB. 3.375 every 8 hours    MEDICATIONS  (STANDING):  aspirin enteric coated 81 daily  famotidine    Tablet 20 daily  insulin glargine Injectable (LANTUS) 7 at bedtime  insulin lispro Injectable (HumaLOG) 3 before breakfast  insulin lispro Injectable (HumaLOG) 3 before lunch  insulin lispro Injectable (HumaLOG) 3 before dinner  pravastatin 20 at bedtime  predniSONE   Tablet 5 two times a day  tacrolimus 7 <User Schedule>      Vital Signs Last 24 Hrs  T(F): 97.8 (06-16-18 @ 08:03), Max: 98 (06-15-18 @ 23:39)  HR: 106 (06-16-18 @ 08:03)  BP: 133/87 (06-16-18 @ 08:03)  RR: 18 (06-16-18 @ 08:03)  SpO2: 96% (06-16-18 @ 08:03) (93% - 100%)  Wt(kg): --    Physical Exam:  General: NAD,  non toxic, A&O x 3  HEENT: dentures. no oropharyngeal lesions, no LAD, neck supple  Cardiovascular: regular rate and rhythm   Respiratory: nonlabored, some rhonci noted b/l.  abd:  soft, BS +, no tenderness  : no  diaz  Musculoskeletal: no joint swelling   Skin:  right axilla with few skin nodules, no cellulitis, no purulence - not tender today. chest abdomen with well healing scars   Neurologic:     no focal deficit                        8.6    1.9   )-----------( 344      ( 16 Jun 2018 07:56 )             26.8 06-16    142  |  108  |  26  ----------------------------<  82  3.7   |  22  |  1.40  Ca    8.3      16 Jun 2018 07:56  TPro  5.6  /  Alb  x   /  TBili  x   /  DBili  x   /  AST  x   /  ALT  x   /  AlkPhos  x   06-15                          8.3    1.0   )-----------( 330      ( 15 Kennedy 2018 07:47 )             26.0       06-15    140  |  106  |  31<H>  ----------------------------<  93  3.9   |  21<L>  |  1.62<H>    Ca    8.6      15 Kennedy 2018 07:47    TPro  5.6<L>  /  Alb  x   /  TBili  x   /  DBili  x   /  AST  x   /  ALT  x   /  AlkPhos  x   06-15          MICROBIOLOGY:  Culture - Stool (06.14.18 @ 22:23)    Specimen Source: .Stool Feces    Culture Results:   No enteric pathogens to date: Final culture pending    Cytomegalovirus By PCR (06.14.18 @ 21:56)    Cytomegalovirus By PCR:   NotDetec        Culture - Sputum . (06.13.18 @ 21:58)    Gram Stain:   Rare polymorphonuclear leukocytes per low power field  No Squamous epithelial cells per low power field  Rare Gram Negative Rods per oil power field    Specimen Source: .Sputum Sputum    Culture Results:   Rare Pseudomonas aeruginosa  Normal Respiratory [place] present    Legionella pneumophila Antigen, Urine (06.13.18 @ 20:23)    Legionella Antigen, Urine: Negative      Culture - Blood (06.13.18 @ 14:07)    Specimen Source: .Blood Blood-Peripheral    Culture Results:   No growth to date.    Culture - Blood (06.13.18 @ 14:07)    Specimen Source: .Blood Blood-Peripheral    Culture Results:   No growth to date.    Rapid RVP Result: NotDetec (06-13 @ 19:12)    CMVPCR Log: NotDetec LogIU/mL (06-13 @ 13:37)    Clostridium difficile GDH Toxins A&amp;B, EIA:   Negative (06-14-18 @ 17:16)  Clostridium difficile GDH Interpretation: Negative for toxigenic C. Difficile.      RADIOLOGY:  Xray Chest 1 View- PORTABLE-Urgent (06.13.18 @ 12:40)   Poor inspiratory effort effort. Heart is normal in size. Platelike   atelectasis right lower lobe. The left lung appears to be clear. Status post sternotomy.    < from: CT Chest No Cont (06.15.18 @ 17:13) >  IMPRESSION:    Increase in size of left lower lobe rounded opacity, now measuring 5.3 x   4.6 cm. Rapid enlargement may reflect in part post obstructive   atelectasis.    Unchanged consolidative linear opacities in the right lower and middle   lobes, likely atelectasis.    Small bilateral pleural effusions.    < end of copied text >

## 2018-06-16 NOTE — CHART NOTE - NSCHARTNOTEFT_GEN_A_CORE
No urine output since 1930  RN asked to bladder scan and ambulate patient to bathroom  GN=110 with 27cc PVR  Will implement hydration recommendations of 0.9ns@50cc hour per nephrology.  Continue to monitor urine output

## 2018-06-16 NOTE — PROGRESS NOTE ADULT - PROBLEM SELECTOR PLAN 6
Unclear etiology. No overt evidence of bleeding. Transfused 1 Unit of PRBCs at admission with appropriate increase in hematocrit.   Hematology consulted to assist in evaluation (i.e. bone marrow suppression vs. hemolysis)

## 2018-06-16 NOTE — PROGRESS NOTE ADULT - ASSESSMENT
68yoAAM w/ PMH of [facility] s/p HM2 LVAD 6/2017 then s/p Heart transplant 2/23/18. 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 Pt now presented w/ fevers rigors and cough. Admitted for sepsis, thought 2/2 pneumonia. Renal now called for NORMAN.

## 2018-06-16 NOTE — PROGRESS NOTE ADULT - PROBLEM SELECTOR PLAN 3
Continue to hold CellCept given leukopenia  Continue tacrolimus 8 mg PO BID. Level today of 11.7. We will aim for level closer to 10 given severe immunosuppression.   Continue reduced dose of prednisone 5 mg PO BID

## 2018-06-16 NOTE — CONSULT NOTE ADULT - SUBJECTIVE AND OBJECTIVE BOX
This is a  69 y/o male PMH  NICCM, Chronic systolic HF s/p HM2 LVAD 6/2017 , recurrent GIB  no s/p Heart transplant 2/23/18 post op course complicated bygraft dysfunction of unclear etiology. Admitted for fever, rigors, SOB and productive cough.   Dermatology was consulted for painful lesions on R axillary x 3 days. Pt reports he did not notice them before. Very tender. Might have grown larger. No drainage. No recent animal scratches or gardening.        PAST MEDICAL & SURGICAL HISTORY:  DVT of upper extremity (deep vein thrombosis)  Hepatitis C virus  GIB (gastrointestinal bleeding)  Ventricular fibrillation: s/p AICD  PAF (paroxysmal atrial fibrillation): on xarelto  Non-Ischemic Cardiomyopathy  SVT (Supraventricular Tachycardia)  HTN  CHF (Congestive Heart Failure)  H/O heart transplant: 2/2018  LVAD (left ventricular assist device) present  Status post left hip replacement  History of Prior Ablation Treatment: for afib  AICD (Automatic Cardioverter/Defibrillator) Present: [employer] [place] with 1 [employer] [place] lead4/1/09- explanted and replaced with Nektedtronic 2 leads on 9/2/09      General: no fevers/chills, no lethargy  Skin: See HPI  Ophthalmologic: no eye pain or change in vision  ENT: no dysphagia or change in hearing  Respiratory: no SOB or cough  Cardiovascular: no palpitations or chest pain  Gastrointestinal: no abdominal  pain or blood in stool  Genitourinary: no dysuria or frequency  Musculoskeletal: no joint pain or weakness  Neurological: no weakness, numbness or tingling    MEDICATIONS  (STANDING):  aspirin enteric coated 81 milliGRAM(s) Oral daily  atovaquone Suspension 1500 milliGRAM(s) Oral daily  Calcium Citrate+D3 (315mg-250mg/tablet) 2 Tablet(s) 2 Tablet(s) Oral two times a day  famotidine    Tablet 20 milliGRAM(s) Oral daily  filgrastim-sndz Injectable 480 MICROGram(s) SubCutaneous daily  insulin glargine Injectable (LANTUS) 7 Unit(s) SubCutaneous at bedtime  insulin lispro Injectable (HumaLOG) 3 Unit(s) SubCutaneous before breakfast  insulin lispro Injectable (HumaLOG) 3 Unit(s) SubCutaneous before lunch  insulin lispro Injectable (HumaLOG) 3 Unit(s) SubCutaneous before dinner  magnesium oxide 400 milliGRAM(s) Oral every 12 hours  Mavyret 100mg/40mg 3 Tablet(s) 3 Tablet(s) Oral daily  multivitamin 1 Tablet(s) Oral daily  piperacillin/tazobactam IVPB. 3.375 Gram(s) IV Intermittent every 8 hours  pravastatin 20 milliGRAM(s) Oral at bedtime  predniSONE   Tablet 5 milliGRAM(s) Oral two times a day  sodium bicarbonate 650 milliGRAM(s) Oral every 8 hours  sodium chloride 0.9% lock flush 3 milliLiter(s) IV Push every 8 hours  tacrolimus 7 milliGRAM(s) Oral <User Schedule>    MEDICATIONS  (PRN):      Allergies    No Known Allergies        SOCIAL HISTORY: Non-smoker    FAMILY HISTORY:  No pertinent family history in first degree relatives      Vital Signs Last 24 Hrs  Vital Signs Last 24 Hrs  T(C): 36.6 (16 Jun 2018 08:03), Max: 36.7 (15 Kennedy 2018 23:39)  T(F): 97.8 (16 Jun 2018 08:03), Max: 98 (15 Kennedy 2018 23:39)  HR: 106 (16 Jun 2018 08:03) (81 - 106)  BP: 133/87 (16 Jun 2018 08:03) (103/68 - 133/87)  BP(mean): 86 (15 Kennedy 2018 23:39) (86 - 86)  RR: 18 (16 Jun 2018 08:03) (18 - 18)  SpO2: 96% (16 Jun 2018 08:03) (93% - 100%)    PHYSICAL EXAM:     The patient was alert and oriented X 3, well nourished, and in no  apparent distress.  OP showed no ulcerations  There was no visible lymphadenopathy.  Conjunctiva were non injected  There was no clubbing or edema of extremities.  The scalp, hair, face, eyebrows, lips, OP, neck, chest, back,   extremities X 4, nails were examined.  There was no hyperhidrosis or bromhidrosis.    Of note on skin exam:   3 tender skin nodules on R axilla    LABS:                        8.3    1.0   )-----------( 330      ( 15 Kennedy 2018 07:47 )             26.0     06-15    140  |  106  |  31<H>  ----------------------------<  93  3.9   |  21<L>  |  1.62<H>    Ca    8.6      15 Kennedy 2018 07:47    TPro  5.6<L>  /  Alb  x   /  TBili  x   /  DBili  x   /  AST  x   /  ALT  x   /  AlkPhos  x   06-15    PTT - ( 14 Jun 2018 08:16 )  PTT:29.7 sec

## 2018-06-16 NOTE — PROGRESS NOTE ADULT - PROBLEM SELECTOR PLAN 8
Continue atovaquone for PCP prophylaxis.   We will hold valganciclovir for now given possible role in leukopenia. CMV PCR negative. He will need repeat CMV PCR to ensure absence of recrudescence. He may need biopsy of his gut to rule out CMV confined to the gut.

## 2018-06-16 NOTE — PROGRESS NOTE ADULT - PROBLEM SELECTOR PLAN 1
Evaluation underway. Unclear etiology. CMV negative. Cultures negative to this point. C. diff negative.   Continue add on differential to determine Filgastrim dosing for today.   Continue Zosyn per ID recommendations.

## 2018-06-16 NOTE — CONSULT NOTE ADULT - ASSESSMENT
67 y/o male PMH  NICCM, Chronic systolic HF s/p HM2 LVAD 6/2017 , recurrent GIB  no s/p Heart transplant 2/23/18 post op course complicated bygraft dysfunction of unclear etiology. Admitted for fever, rigors, SOB and productive cough. On Zosyn for presumed pneumonia.    Dermatology was consulted for 3 nodules on R axilla. 67 y/o male PMH  NICCM, Chronic systolic HF s/p HM2 LVAD 6/2017 , recurrent GIB  no s/p Heart transplant 2/23/18 post op course complicated bygraft dysfunction of unclear etiology. Admitted for fever, rigors, SOB and productive cough. On Zosyn for presumed pneumonia.    Dermatology was consulted for 3 nodules on R axilla.    Lesions are consistent with epidermal inclusion cysts, which do not appear inflamed.  Suggest treatment as outpatient.

## 2018-06-16 NOTE — PROGRESS NOTE ADULT - SUBJECTIVE AND OBJECTIVE BOX
Subjective: He has ongoing watery stools. He noted some abdominal discomfort last night. No current nausea or vomiting. He has no fevers and in general feels better. No dizziness.     Medications:  aspirin enteric coated 81 milliGRAM(s) Oral daily  atovaquone Suspension 1500 milliGRAM(s) Oral daily  Calcium Citrate+D3 (315mg-250mg/tablet) 2 Tablet(s) 2 Tablet(s) Oral two times a day  famotidine    Tablet 20 milliGRAM(s) Oral daily  filgrastim-sndz Injectable 480 MICROGram(s) SubCutaneous daily  insulin glargine Injectable (LANTUS) 7 Unit(s) SubCutaneous at bedtime  insulin lispro Injectable (HumaLOG) 3 Unit(s) SubCutaneous before breakfast  insulin lispro Injectable (HumaLOG) 3 Unit(s) SubCutaneous before lunch  insulin lispro Injectable (HumaLOG) 3 Unit(s) SubCutaneous before dinner  magnesium oxide 400 milliGRAM(s) Oral every 12 hours  Mavyret 100mg/40mg 3 Tablet(s) 3 Tablet(s) Oral daily  multivitamin 1 Tablet(s) Oral daily  piperacillin/tazobactam IVPB. 3.375 Gram(s) IV Intermittent every 8 hours  pravastatin 20 milliGRAM(s) Oral at bedtime  predniSONE   Tablet 5 milliGRAM(s) Oral two times a day  sodium bicarbonate 650 milliGRAM(s) Oral every 8 hours  sodium chloride 0.9% lock flush 3 milliLiter(s) IV Push every 8 hours  sodium chloride 0.9%. 1000 milliLiter(s) IV Continuous <Continuous>  tacrolimus 7 milliGRAM(s) Oral <User Schedule>      Physical Exam:    Vital Signs Last 24 Hrs  T(C): 36.6 (2018 08:03), Max: 36.7 (15 Kennedy 2018 23:39)  T(F): 97.8 (2018 08:03), Max: 98 (15 Kennedy 2018 23:39)  HR: 106 (2018 08:03) (81 - 106)  BP: 133/87 (2018 08:03) (103/68 - 133/87)  BP(mean): 86 (15 Kennedy 2018 23:39) (86 - 86)  RR: 18 (2018 08:03) (18 - 18)  SpO2: 96% (2018 08:03) (93% - 100%)    Daily Weight in k.7 (2018 09:00)    I&O's Summary    15 Kennedy 2018 07:01  -  2018 07:00  --------------------------------------------------------  IN: 2360 mL / OUT: 550 mL / NET: 1810 mL    General: No distress. Comfortable.  HEENT: EOM intact.  Neck: Neck supple. JVP not elevated. No masses  Chest: Clear to auscultation bilaterally  CV: RRR. Normal S1 and S2. No rubs or gallops. III/VI diastolic murmur heard across precordium. Radial pulses normal. No edema.   Abdomen: Soft, non-distended, non-tender  Skin: No rashes or skin breakdown  Neurology: Alert and oriented times three. Sensation intact  Psych: Affect normal    Labs:                        8.6    1.9   )-----------( 344      ( 2018 07:56 )             26.8         142  |  108  |  26<H>  ----------------------------<  82  3.7   |  22  |  1.40<H>    Ca    8.3<L>      2018 07:56    TPro  5.6<L>  /  Alb  x   /  TBili  x   /  DBili  x   /  AST  x   /  ALT  x   /  AlkPhos  x   06-15    Serum Pro-Brain Natriuretic Peptide: 2146 pg/mL ( @ 17:21)  Lactate Dehydrogenase, Serum: 309 U/L (06-15 @ 07:47)

## 2018-06-16 NOTE — PROGRESS NOTE ADULT - PROBLEM SELECTOR PLAN 1
Pre renal that has improved with hydration. Would continue hydrating with NS. He has hx of hyperkalemia, would avoid LR.  No need for renal sonogram for now. Dose all abx as GFR improves especially antibiotics.  Still neutropenic, consider checking parvo and ebv PCR.

## 2018-06-16 NOTE — PROGRESS NOTE ADULT - ASSESSMENT
Mr. 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 He was admitted on 6/13 with severe leukopenia and neutropenic fevers with SIRS and hypotension. He is currently improved, but still leukopenic, although improving on Filgastrim. He had acute worsening of his renal function, which is improved today with volume administration.      Please call me with questions at [phone]. Plan discussed in multidisciplinary rounds with CTU team and Dr. Parker.

## 2018-06-17 DIAGNOSIS — J18.9 PNEUMONIA, UNSPECIFIED ORGANISM: ICD-10-CM

## 2018-06-17 DIAGNOSIS — R91.8 OTHER NONSPECIFIC ABNORMAL FINDING OF LUNG FIELD: ICD-10-CM

## 2018-06-17 LAB
ANION GAP SERPL CALC-SCNC: 15 MMOL/L — SIGNIFICANT CHANGE UP (ref 5–17)
BASOPHILS # BLD AUTO: 0 K/UL — SIGNIFICANT CHANGE UP (ref 0–0.2)
BASOPHILS NFR BLD AUTO: 0 % — SIGNIFICANT CHANGE UP (ref 0–2)
BUN SERPL-MCNC: 16 MG/DL — SIGNIFICANT CHANGE UP (ref 7–23)
CALCIUM SERPL-MCNC: 8.6 MG/DL — SIGNIFICANT CHANGE UP (ref 8.4–10.5)
CHLORIDE SERPL-SCNC: 103 MMOL/L — SIGNIFICANT CHANGE UP (ref 96–108)
CO2 SERPL-SCNC: 22 MMOL/L — SIGNIFICANT CHANGE UP (ref 22–31)
CREAT SERPL-MCNC: 1.07 MG/DL — SIGNIFICANT CHANGE UP (ref 0.5–1.3)
CULTURE RESULTS: SIGNIFICANT CHANGE UP
EOSINOPHIL # BLD AUTO: 0 K/UL — SIGNIFICANT CHANGE UP (ref 0–0.5)
EOSINOPHIL NFR BLD AUTO: 1 % — SIGNIFICANT CHANGE UP (ref 0–6)
GLUCOSE BLDC GLUCOMTR-MCNC: 117 MG/DL — HIGH (ref 70–99)
GLUCOSE BLDC GLUCOMTR-MCNC: 85 MG/DL — SIGNIFICANT CHANGE UP (ref 70–99)
GLUCOSE BLDC GLUCOMTR-MCNC: 85 MG/DL — SIGNIFICANT CHANGE UP (ref 70–99)
GLUCOSE BLDC GLUCOMTR-MCNC: 89 MG/DL — SIGNIFICANT CHANGE UP (ref 70–99)
GLUCOSE SERPL-MCNC: 114 MG/DL — HIGH (ref 70–99)
HCT VFR BLD CALC: 29 % — LOW (ref 39–50)
HGB BLD-MCNC: 9.7 G/DL — LOW (ref 13–17)
LYMPHOCYTES # BLD AUTO: 1.4 K/UL — SIGNIFICANT CHANGE UP (ref 1–3.3)
LYMPHOCYTES # BLD AUTO: 26 % — SIGNIFICANT CHANGE UP (ref 13–44)
MCHC RBC-ENTMCNC: 31.5 PG — SIGNIFICANT CHANGE UP (ref 27–34)
MCHC RBC-ENTMCNC: 33.5 GM/DL — SIGNIFICANT CHANGE UP (ref 32–36)
MCV RBC AUTO: 94.1 FL — SIGNIFICANT CHANGE UP (ref 80–100)
METAMYELOCYTES # FLD: 2 % — HIGH (ref 0–0)
MONOCYTES # BLD AUTO: 2 K/UL — HIGH (ref 0–0.9)
MONOCYTES NFR BLD AUTO: 26 % — HIGH (ref 2–14)
MYELOCYTES NFR BLD: 2 % — HIGH (ref 0–0)
NEUTROPHILS # BLD AUTO: 3.9 K/UL — SIGNIFICANT CHANGE UP (ref 1.8–7.4)
NEUTROPHILS NFR BLD AUTO: 40 % — LOW (ref 43–77)
NEUTS BAND # BLD: 3 % — SIGNIFICANT CHANGE UP (ref 0–8)
NRBC # BLD: 2 /100 — HIGH (ref 0–0)
PLAT MORPH BLD: NORMAL — SIGNIFICANT CHANGE UP
PLATELET # BLD AUTO: 380 K/UL — SIGNIFICANT CHANGE UP (ref 150–400)
POTASSIUM SERPL-MCNC: 4.5 MMOL/L — SIGNIFICANT CHANGE UP (ref 3.5–5.3)
POTASSIUM SERPL-SCNC: 4.5 MMOL/L — SIGNIFICANT CHANGE UP (ref 3.5–5.3)
RBC # BLD: 3.08 M/UL — LOW (ref 4.2–5.8)
RBC # FLD: 18.7 % — HIGH (ref 10.3–14.5)
RBC BLD AUTO: SIGNIFICANT CHANGE UP
SODIUM SERPL-SCNC: 140 MMOL/L — SIGNIFICANT CHANGE UP (ref 135–145)
SPECIMEN SOURCE: SIGNIFICANT CHANGE UP
TACROLIMUS SERPL-MCNC: 10 NG/ML — SIGNIFICANT CHANGE UP
WBC # BLD: 7.3 K/UL — SIGNIFICANT CHANGE UP (ref 3.8–10.5)
WBC # FLD AUTO: 7.3 K/UL — SIGNIFICANT CHANGE UP (ref 3.8–10.5)

## 2018-06-17 PROCEDURE — 99232 SBSQ HOSP IP/OBS MODERATE 35: CPT

## 2018-06-17 PROCEDURE — 99233 SBSQ HOSP IP/OBS HIGH 50: CPT

## 2018-06-17 RX ORDER — VALGANCICLOVIR 450 MG/1
450 TABLET, FILM COATED ORAL DAILY
Qty: 0 | Refills: 0 | Status: DISCONTINUED | OUTPATIENT
Start: 2018-06-17 | End: 2018-06-18

## 2018-06-17 RX ORDER — CEFEPIME 1 G/1
2000 INJECTION, POWDER, FOR SOLUTION INTRAMUSCULAR; INTRAVENOUS EVERY 12 HOURS
Qty: 0 | Refills: 0 | Status: DISCONTINUED | OUTPATIENT
Start: 2018-06-17 | End: 2018-06-22

## 2018-06-17 RX ADMIN — FAMOTIDINE 20 MILLIGRAM(S): 10 INJECTION INTRAVENOUS at 12:17

## 2018-06-17 RX ADMIN — Medication 1 TABLET(S): at 12:17

## 2018-06-17 RX ADMIN — PIPERACILLIN AND TAZOBACTAM 25 GRAM(S): 4; .5 INJECTION, POWDER, LYOPHILIZED, FOR SOLUTION INTRAVENOUS at 05:49

## 2018-06-17 RX ADMIN — Medication 5 MILLIGRAM(S): at 17:10

## 2018-06-17 RX ADMIN — SODIUM CHLORIDE 3 MILLILITER(S): 9 INJECTION INTRAMUSCULAR; INTRAVENOUS; SUBCUTANEOUS at 05:50

## 2018-06-17 RX ADMIN — Medication 3 UNIT(S): at 07:59

## 2018-06-17 RX ADMIN — SODIUM CHLORIDE 3 MILLILITER(S): 9 INJECTION INTRAMUSCULAR; INTRAVENOUS; SUBCUTANEOUS at 11:48

## 2018-06-17 RX ADMIN — Medication 3 UNIT(S): at 17:09

## 2018-06-17 RX ADMIN — SODIUM CHLORIDE 3 MILLILITER(S): 9 INJECTION INTRAMUSCULAR; INTRAVENOUS; SUBCUTANEOUS at 21:48

## 2018-06-17 RX ADMIN — CEFEPIME 100 MILLIGRAM(S): 1 INJECTION, POWDER, FOR SOLUTION INTRAMUSCULAR; INTRAVENOUS at 17:09

## 2018-06-17 RX ADMIN — Medication 81 MILLIGRAM(S): at 12:17

## 2018-06-17 RX ADMIN — ATOVAQUONE 1500 MILLIGRAM(S): 750 SUSPENSION ORAL at 12:16

## 2018-06-17 RX ADMIN — MAGNESIUM OXIDE 400 MG ORAL TABLET 400 MILLIGRAM(S): 241.3 TABLET ORAL at 05:50

## 2018-06-17 RX ADMIN — Medication 20 MILLIGRAM(S): at 21:47

## 2018-06-17 RX ADMIN — Medication 650 MILLIGRAM(S): at 05:49

## 2018-06-17 RX ADMIN — MAGNESIUM OXIDE 400 MG ORAL TABLET 400 MILLIGRAM(S): 241.3 TABLET ORAL at 17:10

## 2018-06-17 RX ADMIN — Medication 650 MILLIGRAM(S): at 13:40

## 2018-06-17 RX ADMIN — Medication 3 UNIT(S): at 12:17

## 2018-06-17 RX ADMIN — Medication 5 MILLIGRAM(S): at 05:50

## 2018-06-17 RX ADMIN — TACROLIMUS 7 MILLIGRAM(S): 5 CAPSULE ORAL at 19:59

## 2018-06-17 RX ADMIN — TACROLIMUS 7 MILLIGRAM(S): 5 CAPSULE ORAL at 08:18

## 2018-06-17 RX ADMIN — INSULIN GLARGINE 7 UNIT(S): 100 INJECTION, SOLUTION SUBCUTANEOUS at 21:47

## 2018-06-17 RX ADMIN — Medication 650 MILLIGRAM(S): at 21:47

## 2018-06-17 RX ADMIN — VALGANCICLOVIR 450 MILLIGRAM(S): 450 TABLET, FILM COATED ORAL at 14:06

## 2018-06-17 NOTE — PROGRESS NOTE ADULT - ASSESSMENT
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 EF 43% His course also complicated by bilateral IJ/subclavian  thrombi, small  persistent right pleural effusion as well as acute on chronic renal failure. 5/23/18 admitted for hyperglycemia, hyponatremia and acute kidney injury  Today 6/18 BIBEMS to [facility] ED reports fever  rigors, labored breathing productive cough with clear sputum  for past two days ,unable to give himself injections r/t shaking home RN called 911    In ER, sinus tach 130s improved to 110s after 1L IVF and vanc/zosyn infusions. /75, satting high 90s on 2L NC, w resp rate 22. Rectal temp recorded 38.8C.  Admitted to [address] [place] [address] SDU  Seen by heart failure team ananya callahan      6/14 - HCT =20.6 Packed RBCs ordered by DR. Stahl- will check HCT 2 hrs post-transfusion- if HCT still low - will transfuse a 2nd unit RBC  echo done - unchanged from last echo  tacro level 8.2 - will d/w Dr. Stahl-pt to remain in Step down unit on neutropenic precautions for WBC 1  Optho consult appreciated to r/o CMV retinitis - eyes dilated  Heme consult-to see pt in am - persistent anemia - etiology - ?hemolysis ?bone marrow ?blood loss  6/15 wbc remans low  Tacro >12, dose changed 7 bid  Ct scan pending r/o pna.  Nephrology consult called for elevated creat to 1.6  3  raised areas under his R armpit, uncertain etiology, Derm consulted.  6/16 Tacra level 11.7 - continue tacro 7 bid. maintain level around 10  6/17 unable to obtain blood specimen from pt. after 3 attempts - pt refusing further attempts - spoke with DR. Stahl - will get labs @ 7pm prior to 8pm tacra dose along with all other labs. d/c planning- ID switched to Cefepime 2g IV q12h for better coverage

## 2018-06-17 NOTE — PROGRESS NOTE ADULT - PROBLEM SELECTOR PLAN 9
Continue atovaquone for PCP prophylaxis.   We will resume valganciclovir 450 mg daily as above. He may need biopsy of his gut to rule out CMV confined to the gut.

## 2018-06-17 NOTE — PROGRESS NOTE ADULT - PROBLEM SELECTOR PLAN 7
Resolved. Unclear etiology. No overt evidence of bleeding. Transfused 1 Unit of PRBCs at admission with appropriate increase in hematocrit.   Hematology consulted to assist in evaluation (i.e. bone marrow suppression vs. hemolysis)

## 2018-06-17 NOTE — PROGRESS NOTE ADULT - SUBJECTIVE AND OBJECTIVE BOX
Subjective: He says that he feels better today without acute complaints. He has a persistent cough, that is non-productive.     Medications:  aspirin enteric coated 81 milliGRAM(s) Oral daily  atovaquone Suspension 1500 milliGRAM(s) Oral daily  Calcium Citrate+D3 (315mg-250mg/tablet) 2 Tablet(s) 2 Tablet(s) Oral two times a day  cefepime   IVPB 2000 milliGRAM(s) IV Intermittent every 12 hours  famotidine    Tablet 20 milliGRAM(s) Oral daily  insulin glargine Injectable (LANTUS) 7 Unit(s) SubCutaneous at bedtime  insulin lispro Injectable (HumaLOG) 3 Unit(s) SubCutaneous before breakfast  insulin lispro Injectable (HumaLOG) 3 Unit(s) SubCutaneous before lunch  insulin lispro Injectable (HumaLOG) 3 Unit(s) SubCutaneous before dinner  magnesium oxide 400 milliGRAM(s) Oral every 12 hours  Mavyret 100mg/40mg 3 Tablet(s) 3 Tablet(s) Oral daily  multivitamin 1 Tablet(s) Oral daily  pravastatin 20 milliGRAM(s) Oral at bedtime  predniSONE   Tablet 5 milliGRAM(s) Oral two times a day  sodium bicarbonate 650 milliGRAM(s) Oral every 8 hours  sodium chloride 0.9% lock flush 3 milliLiter(s) IV Push every 8 hours  sodium chloride 0.9%. 1000 milliLiter(s) IV Continuous <Continuous>  tacrolimus 7 milliGRAM(s) Oral <User Schedule>    Physical Exam:    Vital Signs Last 24 Hrs  T(C): 36.7 (17 Jun 2018 08:19), Max: 37.1 (16 Jun 2018 20:31)  T(F): 98 (17 Jun 2018 08:19), Max: 98.7 (16 Jun 2018 20:31)  HR: 90 (17 Jun 2018 08:19) (90 - 105)  BP: 132/71 (17 Jun 2018 08:19) (122/85 - 132/71)  RR: 18 (17 Jun 2018 08:19) (18 - 18)  SpO2: 96% (17 Jun 2018 08:19) (94% - 98%)    I&O's Summary    16 Jun 2018 07:01  -  17 Jun 2018 07:00  --------------------------------------------------------  IN: 1350 mL / OUT: 2050 mL / NET: -700 mL    17 Jun 2018 07:01  -  17 Jun 2018 12:08  --------------------------------------------------------  IN: 240 mL / OUT: 400 mL / NET: -160 mL      General: No distress. Comfortable.  HEENT: EOM intact.  Neck: Neck supple. JVP not elevated. No masses  Chest: Crackles heard at left lung base.   CV: Normal S1 and S2. No murmurs, rub, or gallops. Radial pulses normal.  Abdomen: Soft, non-distended, non-tender  Skin: No rashes or skin breakdown  Neurology: Alert and oriented times three. Sensation intact  Psych: Affect normal    Labs:                        9.7    7.3   )-----------( 380      ( 17 Jun 2018 11:23 )             29.0     06-17    140  |  103  |  16  ----------------------------<  114<H>  4.5   |  22  |  1.07    Ca    8.6      17 Jun 2018 11:23    Serum Pro-Brain Natriuretic Peptide: 2146 pg/mL (06-13 @ 17:21)    Lactate Dehydrogenase, Serum: 309 U/L (06-15 @ 07:47)    Tacrolimus (), Serum: 11.7: Tacrolimus testing is performed on the [employer]  by  chemiluminescent microparticle immunoassay. The therapeutic range of  tacrolimus is not clearly defined but target 12-hour trough whole blood  concentrations are 5.0 - 20.0 ng/mL early post transplant. Twenty-four  hour  trough concentrations are 33-50% less than the corresponding 12-hour  trough. ng/mL (06.16.18 @ 08:51)

## 2018-06-17 NOTE — PROGRESS NOTE ADULT - SUBJECTIVE AND OBJECTIVE BOX
VITAL SIGNS-Tele:  SR   Vital Signs Last 24 Hrs  T(C): 36.7 (06-17-18 @ 08:19), Max: 37.1 (06-16-18 @ 20:31)  HR: 90 (06-17-18 @ 08:19) (90 - 105)  BP: 132/71 (06-17-18 @ 08:19) (122/85 - 132/71)  SpO2: 96% (06-17-18 @ 08:19) (94% - 98%)         06-16 @ 07:01  -  06-17 @ 07:00  --------------------------------------------------------  IN: 1350 mL / OUT: 2050 mL / NET: -700 mL    06-17 @ 07:01  -  06-17 @ 10:49  --------------------------------------------------------  IN: 240 mL / OUT: 400 mL / NET: -160 mL    Daily     Daily     CAPILLARY BLOOD GLUCOSE  POCT Blood Glucose.: 85 mg/dL (17 Jun 2018 07:38)  POCT Blood Glucose.: 89 mg/dL (16 Jun 2018 21:35)  POCT Blood Glucose.: 142 mg/dL (16 Jun 2018 16:52)            PHYSICAL EXAM:  Neurology: alert and oriented x 3, nonfocal, no gross deficits  CV : +LVAD sounds  Sternal Wound :  CDI , Stable  Lungs: CTA  Abdomen: soft, nontender, nondistended, positive bowel sounds, last bowel movement         Extremities:     NO Edema, no calf tenderness    aspirin enteric coated 81 milliGRAM(s) Oral daily  atovaquone Suspension 1500 milliGRAM(s) Oral daily  Calcium Citrate+D3 (315mg-250mg/tablet) 2 Tablet(s) 2 Tablet(s) Oral two times a day  cefepime   IVPB 2000 milliGRAM(s) IV Intermittent every 12 hours  famotidine    Tablet 20 milliGRAM(s) Oral daily  insulin glargine Injectable (LANTUS) 7 Unit(s) SubCutaneous at bedtime  insulin lispro Injectable (HumaLOG) 3 Unit(s) SubCutaneous before breakfast  insulin lispro Injectable (HumaLOG) 3 Unit(s) SubCutaneous before lunch  insulin lispro Injectable (HumaLOG) 3 Unit(s) SubCutaneous before dinner  magnesium oxide 400 milliGRAM(s) Oral every 12 hours  Mavyret 100mg/40mg 3 Tablet(s) 3 Tablet(s) Oral daily  multivitamin 1 Tablet(s) Oral daily  pravastatin 20 milliGRAM(s) Oral at bedtime  predniSONE   Tablet 5 milliGRAM(s) Oral two times a day  sodium bicarbonate 650 milliGRAM(s) Oral every 8 hours  sodium chloride 0.9% lock flush 3 milliLiter(s) IV Push every 8 hours  sodium chloride 0.9%. 1000 milliLiter(s) IV Continuous <Continuous>  tacrolimus 7 milliGRAM(s) Oral <User Schedule>    Physical Therapy Rec:   Home  [ x ]   Home w/ PT  [  ]  Rehab  [  ]  Discussed with Cardiothoracic Team at AM rounds.

## 2018-06-17 NOTE — PROGRESS NOTE ADULT - PROBLEM SELECTOR PLAN 1
- Resolved. Unclear why he became neutropenic, but prior to last discharge his CellCept had been decreased due to decreasing WBC count.  - Given resolution of leukopenia, will resume valganciclovir at 450 mg daily and increase to 450 mg BID if tolerated. CMV PCR was negative.

## 2018-06-17 NOTE — CHART NOTE - NSCHARTNOTEFT_GEN_A_CORE
Discussed CT chest findings with transplant team.  Lesion is peripheral and increasing in size.  Concerning giving immunosuppressed state.  Team to arrange for biopsy.  Pt stable today.  Will hold off on starting antifungals for now.  Fungitell is pending.

## 2018-06-17 NOTE — PROGRESS NOTE ADULT - SUBJECTIVE AND OBJECTIVE BOX
[facility] DIVISION OF KIDNEY DISEASES AND HYPERTENSION -- FOLLOW UP NOTE  --------------------------------------------------------------------------------  Chief Complaint:    24 hour events/subjective:  doing well overall  UO 2L      PAST HISTORY  --------------------------------------------------------------------------------  No significant changes to PMH, PSH, FHx, SHx, unless otherwise noted    ALLERGIES & MEDICATIONS  --------------------------------------------------------------------------------  Allergies    No Known Allergies    Intolerances      Standing Inpatient Medications  aspirin enteric coated 81 milliGRAM(s) Oral daily  atovaquone Suspension 1500 milliGRAM(s) Oral daily  Calcium Citrate+D3 (315mg-250mg/tablet) 2 Tablet(s) 2 Tablet(s) Oral two times a day  famotidine    Tablet 20 milliGRAM(s) Oral daily  insulin glargine Injectable (LANTUS) 7 Unit(s) SubCutaneous at bedtime  insulin lispro Injectable (HumaLOG) 3 Unit(s) SubCutaneous before breakfast  insulin lispro Injectable (HumaLOG) 3 Unit(s) SubCutaneous before lunch  insulin lispro Injectable (HumaLOG) 3 Unit(s) SubCutaneous before dinner  magnesium oxide 400 milliGRAM(s) Oral every 12 hours  Mavyret 100mg/40mg 3 Tablet(s) 3 Tablet(s) Oral daily  multivitamin 1 Tablet(s) Oral daily  piperacillin/tazobactam IVPB. 3.375 Gram(s) IV Intermittent every 8 hours  pravastatin 20 milliGRAM(s) Oral at bedtime  predniSONE   Tablet 5 milliGRAM(s) Oral two times a day  sodium bicarbonate 650 milliGRAM(s) Oral every 8 hours  sodium chloride 0.9% lock flush 3 milliLiter(s) IV Push every 8 hours  sodium chloride 0.9%. 1000 milliLiter(s) IV Continuous <Continuous>  tacrolimus 7 milliGRAM(s) Oral <User Schedule>    PRN Inpatient Medications      REVIEW OF SYSTEMS  --------------------------------------------------------------------------------  Gen: No weight changes, fatigue, fevers/chills, weakness  Skin: No rashes  Head/Eyes/Ears/Mouth: No headache; Normal hearing; Normal vision w/o blurriness; No sinus pain/discomfort, sore throat  Respiratory: No dyspnea, cough, wheezing, hemoptysis  CV: No chest pain, PND, orthopnea  GI: No abdominal pain, diarrhea, constipation, nausea, vomiting, melena, hematochezia  : No increased frequency, dysuria, hematuria, nocturia  MSK: No joint pain/swelling; no back pain; no edema  Neuro: No dizziness/lightheadedness, weakness, seizures, numbness, tingling  Heme: No easy bruising or bleeding  Endo: No heat/cold intolerance  Psych: No significant nervousness, anxiety, stress, depression    All other systems were reviewed and are negative, except as noted.    VITALS/PHYSICAL EXAM  --------------------------------------------------------------------------------  T(C): 36.7 (06-17-18 @ 08:19), Max: 37.1 (06-16-18 @ 20:31)  HR: 90 (06-17-18 @ 08:19) (90 - 105)  BP: 132/71 (06-17-18 @ 08:19) (122/85 - 132/71)  RR: 18 (06-17-18 @ 08:19) (18 - 18)  SpO2: 96% (06-17-18 @ 08:19) (94% - 98%)  Wt(kg): --        06-16-18 @ 07:01  -  06-17-18 @ 07:00  --------------------------------------------------------  IN: 1350 mL / OUT: 2050 mL / NET: -700 mL    06-17-18 @ 07:01  -  06-17-18 @ 08:41  --------------------------------------------------------  IN: 240 mL / OUT: 400 mL / NET: -160 mL    PHYSICAL EXAM: vital signs as above  in no apparent distress  Neck: Supple, no JVD,    Lungs: no rhonchi, no wheeze, no crackles  CVS: S1 S2 no M/R/G  Ext: no cyanosis or clubbing, no edema        LABS/STUDIES  --------------------------------------------------------------------------------              8.6    1.9   >-----------<  344      [06-16-18 @ 07:56]              26.8     142  |  108  |  26  ----------------------------<  82      [06-16-18 @ 07:56]  3.7   |  22  |  1.40        Ca     8.3     [06-16-18 @ 07:56]    TPro  5.6  /  Alb  x   /  TBili  x   /  DBili  x   /  AST  x   /  ALT  x   /  AlkPhos  x   [06-15-18 @ 09:35]          Creatinine Trend:  SCr 1.40 [06-16 @ 07:56]  SCr 1.62 [06-15 @ 07:47]  SCr 1.22 [06-14 @ 10:40]  SCr 1.08 [06-14 @ 07:28]  SCr 1.39 [06-13 @ 11:21]    Urinalysis - [06-13-18 @ 11:23]      Color Yellow / Appearance Clear / SG 1.013 / pH 6.0      Gluc Negative / Ketone Negative  / Bili Negative / Urobili Negative       Blood Negative / Protein 30 / Leuk Est Negative / Nitrite Negative      RBC 2-5 / WBC 2-5 / Hyaline  / Gran  / Sq Epi  / Non Sq Epi  / Bacteria     Urine Protein 34      [06-15-18 @ 21:35]  Urine Sodium 44      [06-15-18 @ 19:17]  Urine Potassium 21      [06-15-18 @ 19:17]    Iron 19, TIBC 167, %sat 11      [06-15-18 @ 09:35]  Ferritin 991      [06-15-18 @ 21:31]  HbA1c 10.4      [05-23-18 @ 20:06]  TSH 1.19      [06-13-18 @ 20:23]

## 2018-06-17 NOTE — PROGRESS NOTE ADULT - PROBLEM SELECTOR PLAN 4
- Concerning finding in setting of severe immunosuppression. Fungitel pending. We will consult pulmonary. He may need bronchoscopy to further evaluate.

## 2018-06-17 NOTE — PROGRESS NOTE ADULT - ASSESSMENT
Mr. 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 He was admitted on 6/13 with severe leukopenia and neutropenic fevers with SIRS and hypotension. He is currently improved, with normal WBC count after receiving 4 doses of Filgastrim, and holding CellCept and valganciclovir. He had acute worsening of his renal function, which has resolved. He has a large left sided lung nodule/opacity of unclear etiology.      Please call me with questions at [phone]. Plan discussed in multidisciplinary rounds with CTU team and Dr. Parker.

## 2018-06-17 NOTE — PROGRESS NOTE ADULT - PROBLEM SELECTOR PLAN 1
Pre renal that has improved with hydration, UO increased to 2L last evening. Would continue hydrating with NS if needed given active infection. He has hx of hyperkalemia, would avoid LR.  No need for renal sonogram for now. Dose all abx as GFR improves especially antibiotics.  Crt likely better today

## 2018-06-17 NOTE — PROGRESS NOTE ADULT - ASSESSMENT
68yoAAM w/ PMH of [facility] s/p HM2 LVAD 6/2017 then s/p Heart transplant 2/23/18. 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 Pt now presented w/ fevers rigors and cough. Admitted for sepsis, thought 2/2 pneumonia. Renal now called for NORMAN.

## 2018-06-17 NOTE — PROGRESS NOTE ADULT - SUBJECTIVE AND OBJECTIVE BOX
Follow Up:  Neutropenic fever    Interval History: Remains afebrile.  Feels good.  Stools are loose, but not watery.  2 BMs yestserday, none today.  C diff was negative 6/14/18.  No blood, no abdominal pain now but did have some this am, no vomiting or fevers or chills. Right axillary skin lesions. Seen by derm, felt was consistent with inclusion cysts.  Still with cough with phlegm (clear - yellow).  Cough is about the same.      ROS:    Constitutional: no fever, no chills   HEENT:  no sore throat, no rhinorrhea  Cardiovascular:  no chest pain  Respiratory:  slight SOB and cough but overall much improved  GI:  diarrhea  urinary: no dysuria  notes urinating less frequently - improved today  musculoskeletal:  no joint pain    Allergies  No Known Allergies    ANTIMICROBIALS:  atovaquone Suspension 1500 daily  piperacillin/tazobactam IVPB. 3.375 every 8 hours      MEDICATIONS  (STANDING):  aspirin enteric coated 81 daily  famotidine    Tablet 20 daily  insulin glargine Injectable (LANTUS) 7 at bedtime  insulin lispro Injectable (HumaLOG) 3 before breakfast  insulin lispro Injectable (HumaLOG) 3 before lunch  insulin lispro Injectable (HumaLOG) 3 before dinner  pravastatin 20 at bedtime  predniSONE   Tablet 5 two times a day  tacrolimus 7 <User Schedule>        Vital Signs Last 24 Hrs  T(F): 98 (06-17-18 @ 08:19), Max: 98.7 (06-16-18 @ 20:31)  HR: 90 (06-17-18 @ 08:19)  BP: 132/71 (06-17-18 @ 08:19)  RR: 18 (06-17-18 @ 08:19)  SpO2: 96% (06-17-18 @ 08:19) (94% - 98%)  Wt(kg): --    Physical Exam:  General: NAD,  non toxic, A&O x 3  HEENT: dentures. no oropharyngeal lesions, no LAD, neck supple  Cardiovascular: regular rate and rhythm   Respiratory: nonlabored, some rhonci noted b/l.  abd:  soft, BS +, no tenderness  : no  diaz  Musculoskeletal: no joint swelling   Skin:  right axilla with few skin nodules, no cellulitis, no purulence - not tender today. chest abdomen with well healing scars   Neurologic:     no focal deficit                                   8.6    1.9   )-----------( 344      ( 16 Jun 2018 07:56 )             26.8 06-16    142  |  108  |  26  ----------------------------<  82  3.7   |  22  |  1.40  Ca    8.3      16 Jun 2018 07:56  TPro  5.6  /  Alb  x   /  TBili  x   /  DBili  x   /  AST  x   /  ALT  x   /  AlkPhos  x   06-15               8.6    1.9   )-----------( 344      ( 16 Jun 2018 07:56 )             26.8 06-16    142  |  108  |  26  ----------------------------<  82  3.7   |  22  |  1.40  Ca    8.3      16 Jun 2018 07:56  TPro  5.6  /  Alb  x   /  TBili  x   /  DBili  x   /  AST  x   /  ALT  x   /  AlkPhos  x   06-15                          8.3    1.0   )-----------( 330      ( 15 Kennedy 2018 07:47 )             26.0       06-15    140  |  106  |  31<H>  ----------------------------<  93  3.9   |  21<L>  |  1.62<H>    Ca    8.6      15 Kennedy 2018 07:47    TPro  5.6<L>  /  Alb  x   /  TBili  x   /  DBili  x   /  AST  x   /  ALT  x   /  AlkPhos  x   06-15          MICROBIOLOGY:  Culture - Stool (06.14.18 @ 22:23)    Specimen Source: .Stool Feces    Culture Results:   No enteric pathogens to date: Final culture pending    Cytomegalovirus By PCR (06.14.18 @ 21:56)    Cytomegalovirus By PCR:   NotDete      Culture - Sputum . (06.13.18 @ 21:58)    Gram Stain:   Rare polymorphonuclear leukocytes per low power field  No Squamous epithelial cells per low power field  Rare Gram Negative Rods per oil power field    -  Levofloxacin: S <=1    -  Meropenem: R >8    -  Aztreonam: S 8    -  Ciprofloxacin: S <=0.5    -  Imipenem: R >8    -  Cefepime: S 4    -  Piperacillin/Tazobactam: I 32    -  Tobramycin: S <=2    -  Ceftazidime: S 8    -  Gentamicin: S 4    -  Amikacin: S <=8    Specimen Source: .Sputum Sputum    Culture Results:   Rare Pseudomonas aeruginosa (Carbapenem Resistant)  Normal Respiratory Haeven present    Organism Identification: Pseudomonas aeruginosa (Carbapenem Resistant)    Organism: Pseudomonas aeruginosa (Carbapenem Resistant)    Method Type: KAMILA      Legionella pneumophila Antigen, Urine (06.13.18 @ 20:23)    Legionella Antigen, Urine: Negative      Culture - Blood (06.13.18 @ 14:07)    Specimen Source: .Blood Blood-Peripheral    Culture Results:   No growth to date.    Culture - Blood (06.13.18 @ 14:07)    Specimen Source: .Blood Blood-Peripheral    Culture Results:   No growth to date.    Rapid RVP Result: NotDetec (06-13 @ 19:12)    CMVPCR Log: NotDetec LogIU/mL (06-13 @ 13:37)    Clostridium difficile GDH Toxins A&amp;B, EIA:   Negative (06-14-18 @ 17:16)  Clostridium difficile GDH Interpretation: Negative for toxigenic C. Difficile.      RADIOLOGY:  Xray Chest 1 View- PORTABLE-Urgent (06.13.18 @ 12:40)   Poor inspiratory effort effort. Heart is normal in size. Platelike   atelectasis right lower lobe. The left lung appears to be clear. Status post sternotomy.    < from: CT Chest No Cont (06.15.18 @ 17:13) >  IMPRESSION:    Increase in size of left lower lobe rounded opacity, now measuring 5.3 x   4.6 cm. Rapid enlargement may reflect in part post obstructive   atelectasis.    Unchanged consolidative linear opacities in the right lower and middle   lobes, likely atelectasis.    Small bilateral pleural effusions.    < end of copied text >

## 2018-06-17 NOTE — PROCEDURE NOTE - PROCEDURE
<<-----Click on this checkbox to enter Procedure Arterial blood gas draw  06/17/2018    Active  [id number]

## 2018-06-17 NOTE — PROGRESS NOTE ADULT - PROBLEM SELECTOR PLAN 3
Continue to hold CellCept given leukopenia  Dose decreased to 7 mg PO BID given relatively increased level. Level today not drawn due to inability to access vein. Will order level tonight. We will aim for level closer to 10 given severe immunosuppression.   Continue reduced dose of prednisone 5 mg PO BID.  We will continue to hold CellCept.   His echo shows borderline reduced LV and RV systolic function.

## 2018-06-17 NOTE — PROGRESS NOTE ADULT - ASSESSMENT
68M s/p cardiac transplant 2/23/18 for NICM, on Tacrolimus, Cellcept and Prednisone and HCV acquired from donor on Mavyret, admitted 6/13/18 for sepsis, 104.2F and tachycardia, neutropenia. Treating as pneumonia, improving on Zosyn, afebrile since initial temp. Had Pseudomonas on sputum cultures in March, current sputum with rare GNR found to have Normal jose and rare pseudomonas.  CXR with unchanged RLL atelectasis. RVP and urine Legionella negative. CMV PCR, cryptococcus antigen and toxoplasmosis antibody negative. Blood cultures negative to date.   CT chest with increased rounded opacity at LLL.  ?pneumonia vs post obstructive atelectasis.   Loose stool, C diff negative, GI PCR cancelled. Stool culture negative to date.  Reported by EMS to have bedbugs. None seen. Now with right axillary skin nodules, likely inclusion cysts as per derm.  Sputum w/ pseudomonas and now sensitivity reveals intermediate susceptibility to zosyn with carbapenem resistance.  Still remains sensitive to quinolones and cefepime.       Recommend  -continue to follow  Tacrolimus levels   -Discontinue Zosyn 3.375GM IV q8h.  Start cefepime 2 grams IV q12.  Avoiding quinolones b/c of interaction with tacro.  -Mepron for PCP PPX  -monitor ANC and fevers - no more fevers. ANC rising.  -CT chest - increased LLL opacity.  Seems improved..   Consider pulm evaluation.  Fungitell pending.    -Check GI pcr     ID service will be covering over the weekend. Please call for acute issues or questions. [phone]    Dana Villafana MD  [phone] (pager)  [phone] (office)

## 2018-06-18 ENCOUNTER — RESULT REVIEW (OUTPATIENT)
Age: 68
End: 2018-06-18

## 2018-06-18 LAB
% ALBUMIN: 45.9 % — SIGNIFICANT CHANGE UP
% ALPHA 1: 11.1 % — SIGNIFICANT CHANGE UP
% ALPHA 2: 16.7 % — SIGNIFICANT CHANGE UP
% BETA: 16.2 % — SIGNIFICANT CHANGE UP
% GAMMA: 10.1 % — SIGNIFICANT CHANGE UP
ALBUMIN SERPL ELPH-MCNC: 2.6 G/DL — LOW (ref 3.6–5.5)
ALBUMIN/GLOB SERPL ELPH: 0.9 RATIO — SIGNIFICANT CHANGE UP
ALPHA1 GLOB SERPL ELPH-MCNC: 0.6 G/DL — HIGH (ref 0.1–0.4)
ALPHA2 GLOB SERPL ELPH-MCNC: 0.9 G/DL — SIGNIFICANT CHANGE UP (ref 0.5–1)
ANION GAP SERPL CALC-SCNC: 13 MMOL/L — SIGNIFICANT CHANGE UP (ref 5–17)
B-GLOBULIN SERPL ELPH-MCNC: 0.9 G/DL — SIGNIFICANT CHANGE UP (ref 0.5–1)
BASOPHILS # BLD AUTO: 0 K/UL — SIGNIFICANT CHANGE UP (ref 0–0.2)
BASOPHILS NFR BLD AUTO: 0 % — SIGNIFICANT CHANGE UP (ref 0–2)
BLASTS # FLD: 2 % — HIGH (ref 0–0)
BUN SERPL-MCNC: 14 MG/DL — SIGNIFICANT CHANGE UP (ref 7–23)
CALCIUM SERPL-MCNC: 8.9 MG/DL — SIGNIFICANT CHANGE UP (ref 8.4–10.5)
CHLORIDE SERPL-SCNC: 106 MMOL/L — SIGNIFICANT CHANGE UP (ref 96–108)
CMV DNA CSF QL NAA+PROBE: SIGNIFICANT CHANGE UP
CMV DNA SPEC NAA+PROBE-LOG#: SIGNIFICANT CHANGE UP LOGIU/ML
CO2 SERPL-SCNC: 22 MMOL/L — SIGNIFICANT CHANGE UP (ref 22–31)
CREAT SERPL-MCNC: 0.99 MG/DL — SIGNIFICANT CHANGE UP (ref 0.5–1.3)
CULTURE RESULTS: SIGNIFICANT CHANGE UP
CULTURE RESULTS: SIGNIFICANT CHANGE UP
EOSINOPHIL # BLD AUTO: 0 K/UL — SIGNIFICANT CHANGE UP (ref 0–0.5)
EOSINOPHIL NFR BLD AUTO: 0 % — SIGNIFICANT CHANGE UP (ref 0–6)
FUNGITELL: <31 PG/ML — SIGNIFICANT CHANGE UP (ref 0–59)
GAMMA GLOBULIN: 0.6 G/DL — SIGNIFICANT CHANGE UP (ref 0.6–1.6)
GLUCOSE BLDC GLUCOMTR-MCNC: 121 MG/DL — HIGH (ref 70–99)
GLUCOSE BLDC GLUCOMTR-MCNC: 96 MG/DL — SIGNIFICANT CHANGE UP (ref 70–99)
GLUCOSE BLDC GLUCOMTR-MCNC: 96 MG/DL — SIGNIFICANT CHANGE UP (ref 70–99)
GLUCOSE SERPL-MCNC: 74 MG/DL — SIGNIFICANT CHANGE UP (ref 70–99)
HCT VFR BLD CALC: 30.9 % — LOW (ref 39–50)
HGB BLD-MCNC: 9.5 G/DL — LOW (ref 13–17)
INTERPRETATION SERPL IFE-IMP: SIGNIFICANT CHANGE UP
LYMPHOCYTES # BLD AUTO: 1.3 K/UL — SIGNIFICANT CHANGE UP (ref 1–3.3)
LYMPHOCYTES # BLD AUTO: 3 % — LOW (ref 13–44)
MCHC RBC-ENTMCNC: 28.8 PG — SIGNIFICANT CHANGE UP (ref 27–34)
MCHC RBC-ENTMCNC: 30.7 GM/DL — LOW (ref 32–36)
MCV RBC AUTO: 94 FL — SIGNIFICANT CHANGE UP (ref 80–100)
METAMYELOCYTES # FLD: 6 % — HIGH (ref 0–0)
MONOCYTES # BLD AUTO: 2.8 K/UL — HIGH (ref 0–0.9)
MONOCYTES NFR BLD AUTO: 13 % — SIGNIFICANT CHANGE UP (ref 2–14)
MYELOCYTES NFR BLD: 6 % — HIGH (ref 0–0)
NEUTROPHILS # BLD AUTO: SIGNIFICANT CHANGE UP (ref 1.8–7.4)
NEUTROPHILS NFR BLD AUTO: 47 % — SIGNIFICANT CHANGE UP (ref 43–77)
NEUTS BAND # BLD: 21 % — HIGH (ref 0–8)
NRBC # BLD: 4 /100 — HIGH (ref 0–0)
PLAT MORPH BLD: NORMAL — SIGNIFICANT CHANGE UP
PLATELET # BLD AUTO: 364 K/UL — SIGNIFICANT CHANGE UP (ref 150–400)
POTASSIUM SERPL-MCNC: 4 MMOL/L — SIGNIFICANT CHANGE UP (ref 3.5–5.3)
POTASSIUM SERPL-SCNC: 4 MMOL/L — SIGNIFICANT CHANGE UP (ref 3.5–5.3)
PROMYELOCYTES # FLD: 2 % — HIGH (ref 0–0)
PROT PATTERN SERPL ELPH-IMP: SIGNIFICANT CHANGE UP
RBC # BLD: 3.28 M/UL — LOW (ref 4.2–5.8)
RBC # FLD: 19.2 % — HIGH (ref 10.3–14.5)
RBC BLD AUTO: SIGNIFICANT CHANGE UP
SODIUM SERPL-SCNC: 141 MMOL/L — SIGNIFICANT CHANGE UP (ref 135–145)
SPECIMEN SOURCE: SIGNIFICANT CHANGE UP
SPECIMEN SOURCE: SIGNIFICANT CHANGE UP
TACROLIMUS SERPL-MCNC: 10.5 NG/ML — SIGNIFICANT CHANGE UP
WBC # BLD: 19 K/UL — HIGH (ref 3.8–10.5)
WBC # FLD AUTO: 19 K/UL — HIGH (ref 3.8–10.5)

## 2018-06-18 PROCEDURE — 88305 TISSUE EXAM BY PATHOLOGIST: CPT | Mod: 26

## 2018-06-18 PROCEDURE — 76775 US EXAM ABDO BACK WALL LIM: CPT | Mod: 26

## 2018-06-18 PROCEDURE — 99233 SBSQ HOSP IP/OBS HIGH 50: CPT

## 2018-06-18 PROCEDURE — 88312 SPECIAL STAINS GROUP 1: CPT | Mod: 26

## 2018-06-18 PROCEDURE — 77012 CT SCAN FOR NEEDLE BIOPSY: CPT | Mod: 26

## 2018-06-18 PROCEDURE — 71045 X-RAY EXAM CHEST 1 VIEW: CPT | Mod: 26

## 2018-06-18 PROCEDURE — 88173 CYTOPATH EVAL FNA REPORT: CPT | Mod: 26

## 2018-06-18 PROCEDURE — 32405: CPT

## 2018-06-18 PROCEDURE — 99232 SBSQ HOSP IP/OBS MODERATE 35: CPT | Mod: GC

## 2018-06-18 RX ORDER — VALGANCICLOVIR 450 MG/1
450 TABLET, FILM COATED ORAL
Qty: 0 | Refills: 0 | Status: DISCONTINUED | OUTPATIENT
Start: 2018-06-18 | End: 2018-06-26

## 2018-06-18 RX ORDER — INSULIN LISPRO 100/ML
VIAL (ML) SUBCUTANEOUS
Qty: 0 | Refills: 0 | Status: DISCONTINUED | OUTPATIENT
Start: 2018-06-18 | End: 2018-06-26

## 2018-06-18 RX ADMIN — CEFEPIME 100 MILLIGRAM(S): 1 INJECTION, POWDER, FOR SOLUTION INTRAMUSCULAR; INTRAVENOUS at 21:03

## 2018-06-18 RX ADMIN — Medication 81 MILLIGRAM(S): at 11:16

## 2018-06-18 RX ADMIN — VALGANCICLOVIR 450 MILLIGRAM(S): 450 TABLET, FILM COATED ORAL at 11:16

## 2018-06-18 RX ADMIN — MAGNESIUM OXIDE 400 MG ORAL TABLET 400 MILLIGRAM(S): 241.3 TABLET ORAL at 06:47

## 2018-06-18 RX ADMIN — Medication 650 MILLIGRAM(S): at 11:16

## 2018-06-18 RX ADMIN — Medication 650 MILLIGRAM(S): at 06:47

## 2018-06-18 RX ADMIN — FAMOTIDINE 20 MILLIGRAM(S): 10 INJECTION INTRAVENOUS at 11:16

## 2018-06-18 RX ADMIN — SODIUM CHLORIDE 3 MILLILITER(S): 9 INJECTION INTRAMUSCULAR; INTRAVENOUS; SUBCUTANEOUS at 13:58

## 2018-06-18 RX ADMIN — SODIUM CHLORIDE 3 MILLILITER(S): 9 INJECTION INTRAMUSCULAR; INTRAVENOUS; SUBCUTANEOUS at 23:03

## 2018-06-18 RX ADMIN — VALGANCICLOVIR 450 MILLIGRAM(S): 450 TABLET, FILM COATED ORAL at 23:09

## 2018-06-18 RX ADMIN — Medication 650 MILLIGRAM(S): at 23:09

## 2018-06-18 RX ADMIN — MAGNESIUM OXIDE 400 MG ORAL TABLET 400 MILLIGRAM(S): 241.3 TABLET ORAL at 20:42

## 2018-06-18 RX ADMIN — Medication 1 TABLET(S): at 11:16

## 2018-06-18 RX ADMIN — Medication 3 UNIT(S): at 07:39

## 2018-06-18 RX ADMIN — Medication 5 MILLIGRAM(S): at 20:45

## 2018-06-18 RX ADMIN — Medication 20 MILLIGRAM(S): at 23:09

## 2018-06-18 RX ADMIN — TACROLIMUS 7 MILLIGRAM(S): 5 CAPSULE ORAL at 08:02

## 2018-06-18 RX ADMIN — ATOVAQUONE 1500 MILLIGRAM(S): 750 SUSPENSION ORAL at 11:16

## 2018-06-18 RX ADMIN — Medication 5 MILLIGRAM(S): at 06:47

## 2018-06-18 RX ADMIN — TACROLIMUS 7 MILLIGRAM(S): 5 CAPSULE ORAL at 20:42

## 2018-06-18 RX ADMIN — CEFEPIME 100 MILLIGRAM(S): 1 INJECTION, POWDER, FOR SOLUTION INTRAMUSCULAR; INTRAVENOUS at 07:00

## 2018-06-18 RX ADMIN — INSULIN GLARGINE 7 UNIT(S): 100 INJECTION, SOLUTION SUBCUTANEOUS at 23:12

## 2018-06-18 RX ADMIN — SODIUM CHLORIDE 3 MILLILITER(S): 9 INJECTION INTRAMUSCULAR; INTRAVENOUS; SUBCUTANEOUS at 07:17

## 2018-06-18 NOTE — PROGRESS NOTE ADULT - ASSESSMENT
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 EF 43% His course also complicated by bilateral IJ/subclavian  thrombi, small  persistent right pleural effusion as well as acute on chronic renal failure. 5/23/18 admitted for hyperglycemia, hyponatremia and acute kidney injury  Today 6/18 BIBEMS to [facility] ED reports fever  rigors, labored breathing productive cough with clear sputum  for past two days ,unable to give himself injections r/t shaking home RN called 911    In ER, sinus tach 130s improved to 110s after 1L IVF and vanc/zosyn infusions. /75, satting high 90s on 2L NC, w resp rate 22. Rectal temp recorded 38.8C.  Admitted to [address] [place] [address] SDU  Seen by heart failure team ananya callahan      6/14 - HCT =20.6 Packed RBCs ordered by DR. Stahl- will check HCT 2 hrs post-transfusion- if HCT still low - will transfuse a 2nd unit RBC  echo done - unchanged from last echo  tacro level 8.2 - will d/w Dr. Stahl-pt to remain in Step down unit on neutropenic precautions for WBC 1  Optho consult appreciated to r/o CMV retinitis - eyes dilated  Heme consult-to see pt in am - persistent anemia - etiology - ?hemolysis ?bone marrow ?blood loss  6/15 wbc remans low  Tacro >12, dose changed 7 bid  Ct scan pending r/o pna.  Nephrology consult called for elevated creat to 1.6  3  raised areas under his R armpit, uncertain etiology, Derm consulted.  6/16 Tacra level 11.7 - continue tacro 7 bid. maintain level around 10  6/17 unable to obtain blood specimen from pt. after 3 attempts - pt refusing further attempts - spoke with DR. Stahl - will get labs @ 7pm prior to 8pm tacra dose along with all other labs. d/c planning- ID switched to Cefepime 2g IV q12h for better coverage  6/18 Chest ct with LLL mass/abcess.  Pt remains with cough, wbc up but could be r/t tx, afebrile.  Will obtain a biopsy in IR.  IR called.   IR re contacted by Dr Lozano , d/w Dr. Fuentes. biopsy today.  Aldactone increased for hypokalemia

## 2018-06-18 NOTE — PROGRESS NOTE ADULT - SUBJECTIVE AND OBJECTIVE BOX
[facility] DIVISION OF KIDNEY DISEASES AND HYPERTENSION -- [phone]   FOLLOW UP NOTE  --------------------------------------------------------------------------------  HPI:  68yoAAM w/ PMH of [facility] s/p HM2 LVAD 6/2017 then s/p Heart transplant 2/23/18. 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 Pt now presented w/ fevers rigors and cough. Admitted for sepsis, thought 2/2 pneumonia. Renal now called for NORMAN.      PAST HISTORY  --------------------------------------------------------------------------------  No significant changes to PMH, PSH, FHx, SHx, unless otherwise noted    ALLERGIES & MEDICATIONS  --------------------------------------------------------------------------------  Allergies    No Known Allergies    Intolerances      Standing Inpatient Medications  aspirin enteric coated 81 milliGRAM(s) Oral daily  atovaquone Suspension 1500 milliGRAM(s) Oral daily  Calcium Citrate+D3 (315mg-250mg/tablet) 2 Tablet(s) 2 Tablet(s) Oral two times a day  cefepime   IVPB 2000 milliGRAM(s) IV Intermittent every 12 hours  famotidine    Tablet 20 milliGRAM(s) Oral daily  insulin glargine Injectable (LANTUS) 7 Unit(s) SubCutaneous at bedtime  insulin lispro Injectable (HumaLOG) 3 Unit(s) SubCutaneous before breakfast  insulin lispro Injectable (HumaLOG) 3 Unit(s) SubCutaneous before lunch  insulin lispro Injectable (HumaLOG) 3 Unit(s) SubCutaneous before dinner  magnesium oxide 400 milliGRAM(s) Oral every 12 hours  Mavyret 100mg/40mg 3 Tablet(s) 3 Tablet(s) Oral daily  multivitamin 1 Tablet(s) Oral daily  pravastatin 20 milliGRAM(s) Oral at bedtime  predniSONE   Tablet 5 milliGRAM(s) Oral two times a day  sodium bicarbonate 650 milliGRAM(s) Oral every 8 hours  sodium chloride 0.9% lock flush 3 milliLiter(s) IV Push every 8 hours  sodium chloride 0.9%. 1000 milliLiter(s) IV Continuous <Continuous>  tacrolimus 7 milliGRAM(s) Oral <User Schedule>  valGANciclovir 450 milliGRAM(s) Oral daily    PRN Inpatient Medications      REVIEW OF SYSTEMS  --------------------------------------------------------------------------------  General: no fever  CVS: no chest pain  RESP: no sob, no cough  ABD: no abdominal pain  MSK: no edema     VITALS/PHYSICAL EXAM  --------------------------------------------------------------------------------  T(C): 36.7 (06-18-18 @ 03:18), Max: 36.8 (06-17-18 @ 23:24)  HR: 94 (06-18-18 @ 03:18) (94 - 100)  BP: 132/88 (06-18-18 @ 03:18) (126/82 - 135/92)  RR: 17 (06-18-18 @ 03:18) (17 - 18)  SpO2: 94% (06-18-18 @ 03:18) (90% - 94%)  Wt(kg): --        06-17-18 @ 07:01  -  06-18-18 @ 07:00  --------------------------------------------------------  IN: 1160 mL / OUT: 1550 mL / NET: -390 mL    06-18-18 @ 07:01  -  06-18-18 @ 11:08  --------------------------------------------------------  IN: 180 mL / OUT: 625 mL / NET: -445 mL      Physical Exam:  	Gen: NAD  	HEENT: MMM  	Pulm: CTA B/L  	CV: S1S2  	Abd: Soft, +BS  	Ext: No LE edema B/L                      Neuro: Awake   	Skin: Warm and Dry   	    LABS/STUDIES  --------------------------------------------------------------------------------              9.5    19.0  >-----------<  364      [06-18-18 @ 07:24]              30.9     141  |  106  |  14  ----------------------------<  74      [06-18-18 @ 07:24]  4.0   |  22  |  0.99        Ca     8.9     [06-18-18 @ 07:24]            Creatinine Trend:  SCr 0.99 [06-18 @ 07:24]  SCr 1.07 [06-17 @ 11:23]  SCr 1.40 [06-16 @ 07:56]  SCr 1.62 [06-15 @ 07:47]  SCr 1.22 [06-14 @ 10:40]    Urinalysis - [06-13-18 @ 11:23]      Color Yellow / Appearance Clear / SG 1.013 / pH 6.0      Gluc Negative / Ketone Negative  / Bili Negative / Urobili Negative       Blood Negative / Protein 30 / Leuk Est Negative / Nitrite Negative      RBC 2-5 / WBC 2-5 / Hyaline  / Gran  / Sq Epi  / Non Sq Epi  / Bacteria     Urine Protein 34      [06-15-18 @ 21:35]  Urine Sodium 44      [06-15-18 @ 19:17]  Urine Potassium 21      [06-15-18 @ 19:17]    Iron 19, TIBC 167, %sat 11      [06-15-18 @ 09:35]  Ferritin 991      [06-15-18 @ 21:31]  HbA1c 10.4      [05-23-18 @ 20:06]  TSH 1.19      [06-13-18 @ 20:23]      Immunofixation Serum:   No Monoclonal Band Identified      [06-15-18 @ 09:48]  SPEP Interpretation: Normal Electrophoresis Pattern      [06-15-18 @ 09:35]

## 2018-06-18 NOTE — PROGRESS NOTE ADULT - SUBJECTIVE AND OBJECTIVE BOX
Patient is a 68y old  Male who presents with a chief complaint of Fever (13 Jun 2018 20:23)    Being followed by ID for help with managment        pt notes occasional white phlegm  occasional left sided hest pain  No N/V/D  No abd pain  No urinary complaints  No HA  No joint or limb pain  No other complaints    PAST MEDICAL & SURGICAL HISTORY:  DVT of upper extremity (deep vein thrombosis)  Hepatitis C virus  GIB (gastrointestinal bleeding)  Ventricular fibrillation: s/p AICD  PAF (paroxysmal atrial fibrillation): on xarelto  Non-Ischemic Cardiomyopathy  SVT (Supraventricular Tachycardia)  HTN  CHF (Congestive Heart Failure)  H/O heart transplant: 2/2018  LVAD (left ventricular assist device) present  Status post left hip replacement  History of Prior Ablation Treatment: for afib  AICD (Automatic Cardioverter/Defibrillator) Present: [employer] [place] with 1 [employer] lead4/1/09- explanted and replaced with [employer] 2 leads on 9/2/09    Social history: former     Antimicrobials:    atovaquone Suspension 1500 milliGRAM(s) Oral daily  cefepime   IVPB 2000 milliGRAM(s) IV Intermittent every 12 hours  valGANciclovir 450 milliGRAM(s) Oral daily    MEDICATIONS  (STANDING):  aspirin enteric coated 81 milliGRAM(s) Oral daily  atovaquone Suspension 1500 milliGRAM(s) Oral daily  Calcium Citrate+D3 (315mg-250mg/tablet) 2 Tablet(s) 2 Tablet(s) Oral two times a day  cefepime   IVPB 2000 milliGRAM(s) IV Intermittent every 12 hours  famotidine    Tablet 20 milliGRAM(s) Oral daily  insulin glargine Injectable (LANTUS) 7 Unit(s) SubCutaneous at bedtime  insulin lispro Injectable (HumaLOG) 3 Unit(s) SubCutaneous before breakfast  insulin lispro Injectable (HumaLOG) 3 Unit(s) SubCutaneous before lunch  insulin lispro Injectable (HumaLOG) 3 Unit(s) SubCutaneous before dinner  magnesium oxide 400 milliGRAM(s) Oral every 12 hours  Mavyret 100mg/40mg 3 Tablet(s) 3 Tablet(s) Oral daily  multivitamin 1 Tablet(s) Oral daily  pravastatin 20 milliGRAM(s) Oral at bedtime  predniSONE   Tablet 5 milliGRAM(s) Oral two times a day  sodium bicarbonate 650 milliGRAM(s) Oral every 8 hours  sodium chloride 0.9% lock flush 3 milliLiter(s) IV Push every 8 hours  sodium chloride 0.9%. 1000 milliLiter(s) (50 mL/Hr) IV Continuous <Continuous>  tacrolimus 7 milliGRAM(s) Oral <User Schedule>  valGANciclovir 450 milliGRAM(s) Oral daily      Vital Signs Last 24 Hrs  T(C): 36.7 (06-18-18 @ 03:18), Max: 36.8 (06-17-18 @ 23:24)  T(F): 98 (06-18-18 @ 03:18), Max: 98.2 (06-17-18 @ 23:24)  HR: 94 (06-18-18 @ 03:18) (94 - 100)  BP: 132/88 (06-18-18 @ 03:18) (126/82 - 135/92)  BP(mean): --  RR: 17 (06-18-18 @ 03:18) (17 - 18)  SpO2: 94% (06-18-18 @ 03:18) (90% - 94%)    Physical Exam:    Constitutional well preserved,comfortable,pleasant    HEENT PERRLA EOMI,No pallor or icterus    No oral exudate or erythema    Neck supple no JVD or LN    Chest Good AE,CTA    CVS RRR S1 S2 WNl No murmur or rub or gallop    Abd soft BS normal No tenderness no masses    Ext No cyanosis clubbing or edema    IV site no erythema tenderness or discharge    Joints no swelling or LOM     CNS AAO X 3 no focal    Lab Data:                          9.5    19.0  )-----------( 364      ( 18 Jun 2018 07:24 )             30.9       06-18    141  |  106  |  14  ----------------------------<  74  4.0   |  22  |  0.99    Ca    8.9      18 Jun 2018 07:24            .Stool Feces  06-14-18   No enteric pathogens isolated.  (Stool culture examined for Salmonella,  Shigella, Campylobacter, Aeromonas, Plesiomonas,  Vibrio, E.coli O157 and Yersinia)  --  --      .Sputum Sputum  06-13-18   Rare Pseudomonas aeruginosa (Carbapenem Resistant)  Normal Respiratory [place] present  --  Pseudomonas aeruginosa (Carbapenem Resistant)      .Blood Blood-Peripheral  06-13-18   No growth to date.  --  --      .Urine Clean Catch (Midstream)  06-13-18   Culture grew 3 or more types of organisms which indicate  collection contamination; consider recollection only if clinically  indicated.  --  --

## 2018-06-18 NOTE — PROGRESS NOTE ADULT - SUBJECTIVE AND OBJECTIVE BOX
im ok    VITAL SIGNS    Telemetry:  nsr     Vital Signs Last 24 Hrs  T(C): 36.7 (06-18-18 @ 03:18), Max: 36.8 (06-17-18 @ 23:24)  T(F): 98 (06-18-18 @ 03:18), Max: 98.2 (06-17-18 @ 23:24)  HR: 94 (06-18-18 @ 03:18) (94 - 100)  BP: 132/88 (06-18-18 @ 03:18) (126/82 - 135/92)  RR: 17 (06-18-18 @ 03:18) (17 - 18)  SpO2: 94% (06-18-18 @ 03:18) (90% - 94%)                   06-17 @ 07:01  -  06-18 @ 07:00  --------------------------------------------------------  IN: 1160 mL / OUT: 1550 mL / NET: -390 mL    06-18 @ 07:01  -  06-18 @ 12:41  --------------------------------------------------------  IN: 180 mL / OUT: 625 mL / NET: -445 mL          Daily     Daily         CAPILLARY BLOOD GLUCOSE      POCT Blood Glucose.: 121 mg/dL (18 Jun 2018 11:35)  POCT Blood Glucose.: 96 mg/dL (18 Jun 2018 07:27)  POCT Blood Glucose.: 85 mg/dL (17 Jun 2018 21:22)  POCT Blood Glucose.: 89 mg/dL (17 Jun 2018 16:26)                Coumadin    [ ] YES          [  x]      NO                                   PHYSICAL EXAM        Neurology: alert and oriented x 3, nonfocal, no gross deficits  CV : .S1S2 RRR  Sternal Wound :  CDI , Stable  Lungs: cta  Abdomen: soft, nontender, nondistended, positive bowel sounds, last bowel movement today, driveline site cdi  :         voiding    Extremities:     - edema - calve tenderness          aspirin enteric coated 81 milliGRAM(s) Oral daily  atovaquone Suspension 1500 milliGRAM(s) Oral daily  Calcium Citrate+D3 (315mg-250mg/tablet) 2 Tablet(s) 2 Tablet(s) Oral two times a day  cefepime   IVPB 2000 milliGRAM(s) IV Intermittent every 12 hours  famotidine    Tablet 20 milliGRAM(s) Oral daily  insulin glargine Injectable (LANTUS) 7 Unit(s) SubCutaneous at bedtime  insulin lispro Injectable (HumaLOG) 3 Unit(s) SubCutaneous before breakfast  insulin lispro Injectable (HumaLOG) 3 Unit(s) SubCutaneous before lunch  insulin lispro Injectable (HumaLOG) 3 Unit(s) SubCutaneous before dinner  magnesium oxide 400 milliGRAM(s) Oral every 12 hours  Mavyret 100mg/40mg 3 Tablet(s) 3 Tablet(s) Oral daily  multivitamin 1 Tablet(s) Oral daily  pravastatin 20 milliGRAM(s) Oral at bedtime  predniSONE   Tablet 5 milliGRAM(s) Oral two times a day  sodium bicarbonate 650 milliGRAM(s) Oral every 8 hours  sodium chloride 0.9% lock flush 3 milliLiter(s) IV Push every 8 hours  sodium chloride 0.9%. 1000 milliLiter(s) IV Continuous <Continuous>  tacrolimus 7 milliGRAM(s) Oral <User Schedule>  valGANciclovir 450 milliGRAM(s) Oral daily                    Physical Therapy Rec:   Home  [  ]   Home w/ PT  [  ]  Rehab  [  ]  Discussed with Cardiothoracic Team at AM rounds.

## 2018-06-18 NOTE — PROGRESS NOTE ADULT - PROBLEM SELECTOR PLAN 1
- Resolved. Unclear why he became neutropenic, but prior to last discharge his CellCept had been decreased due to decreasing WBC count.  - Given resolution of leukopenia, will resume valganciclovir at 450 mg daily and increase to 450 mg BID if tolerated. CMV PCR was negative. - Resolved and WBC count is now elevated after Neupogen x4  CellCept had been decreased due to decreasing WBC count.  - Given resolution of leukopenia, will resume valganciclovir at 450 mg daily and increase to 450 mg BID if tolerated. CMV PCR was negative.

## 2018-06-18 NOTE — PROGRESS NOTE ADULT - ATTENDING COMMENTS
NICCM s/p LVAD s/p OHT.  Here with PNA/sepsis complicated by NORMAN.    Feeling better. Serum creatinine improved.    Exam: NAD, no edema    NORMAN with improved renal function.  Continue current management.  Remainder per fellow.

## 2018-06-18 NOTE — PROGRESS NOTE ADULT - SUBJECTIVE AND OBJECTIVE BOX
Interventional Radiology Brief- Operative Note    Procedure: CT guided left lung biopsy    Operators: Gina    Anesthesia (type): MAC    Contrast: none    EBL: minimal    Findings/Follow up Plan of Care: CT guided left lung biopsy performed. Core and FNA specimens obtained. Cytopath tech present to evaluate specimen. No pneumo post bx. Pt tolerated procedure. Full report to follow. CXR to be obtained post as portable.    Specimens Removed: samples collected for micro (bacterial, fungal, afb), and path.    Implants: none    Complications: none    Condition/Disposition: stable / pacu    Please call Interventional Radiology x [phone] with any questions, concerns, or issues.

## 2018-06-18 NOTE — CHART NOTE - NSCHARTNOTEFT_GEN_A_CORE
Source: Patient [ X    Family [ ]     other [ X] RN, medical record     Pt seen for nutritional follow up. Pt seen in bed, reports feeling well. Pt expressed being pleased with well controlled BG levels. Pt reports that nausea and loose BM's that were present upon admission have since improved. Pt states he is eating well, 100% of meals, but is requesting a night snack. Pt is currently NPO and was previously ordered for a night; will recommend to resume as medically feasible. Pt denies need for education regarding DM and transplant at this time.     Pt admitted for neutropenic fevers, and anemia. Per chart, Pt with history of NICM, s/p HM2 LVAD implant in 6/2017, recurrent GIB and heart transplant 2/2018. Fevers resolved. Pt with sepsis 2/2 PNA and now NORMAN. Plan for Lung Biopsy today.    Diet : NPO       Patient reports no GI distress      PO intake:  100%      Source for PO intake [X] Patient [ ] family [ X] chart [ ] staff [ ] other      Current Weight: Weight (kg): 70.7kg.   Admission Wt: 79.4kg, fluctuations seen, will continue to trend.   % Weight Change    Pertinent Medications: MEDICATIONS  (STANDING):  aspirin enteric coated 81 milliGRAM(s) Oral daily  atovaquone Suspension 1500 milliGRAM(s) Oral daily  Calcium Citrate+D3 (315mg-250mg/tablet) 2 Tablet(s) 2 Tablet(s) Oral two times a day  cefepime   IVPB 2000 milliGRAM(s) IV Intermittent every 12 hours  famotidine    Tablet 20 milliGRAM(s) Oral daily  insulin glargine Injectable (LANTUS) 7 Unit(s) SubCutaneous at bedtime  insulin lispro Injectable (HumaLOG) 3 Unit(s) SubCutaneous before breakfast  insulin lispro Injectable (HumaLOG) 3 Unit(s) SubCutaneous before lunch  insulin lispro Injectable (HumaLOG) 3 Unit(s) SubCutaneous before dinner  magnesium oxide 400 milliGRAM(s) Oral every 12 hours  Mavyret 100mg/40mg 3 Tablet(s) 3 Tablet(s) Oral daily  multivitamin 1 Tablet(s) Oral daily  pravastatin 20 milliGRAM(s) Oral at bedtime  predniSONE   Tablet 5 milliGRAM(s) Oral two times a day  sodium bicarbonate 650 milliGRAM(s) Oral every 8 hours  sodium chloride 0.9% lock flush 3 milliLiter(s) IV Push every 8 hours  sodium chloride 0.9%. 1000 milliLiter(s) (50 mL/Hr) IV Continuous <Continuous>  tacrolimus 7 milliGRAM(s) Oral <User Schedule>  valGANciclovir 450 milliGRAM(s) Oral two times a day    MEDICATIONS  (PRN):    Pertinent Labs:  Hgb/Hct:9.5/30.9, Na:141, K:4.0, BUN:14, Cr:0.99, Glucose:74, Ca:8.9, BG levels: 6/18: 96. 6/17:       Skin: No edema, skin intake     Estimated Needs:   [ X] no change since previous assessment  [ ] recalculated:       Previous Nutrition Diagnosis: None       Nutrition Diagnosis is [ X] ongoing  [ ] resolved [ ] not applicable          New Nutrition Diagnosis: [X ] not applicable     Interventions:     Recommend    1. Provide food preferences as requested by Pt/family within diet restrictions    2. Encourage PO intake during meal times   3. Reviewed menu ordering procedures   4. Trend BG levels, renal indices, LFT's and electrolytes     Monitoring and Evaluation:     [ X] PO intake [X ] Tolerance to diet prescription [X ] weights [X ] follow up per protocol    [ X] other: RD remains available Sarah Siegler RD, [facility] Pager #[phone]

## 2018-06-18 NOTE — PROGRESS NOTE ADULT - ASSESSMENT
68M s/p cardiac transplant 2/23/18 for NICM, on Tacrolimus, Cellcept and Prednisone and HCV acquired from donor on Mavyret, admitted 6/13/18 for sepsis, 104.2F and tachycardia, neutropenia. Treating as pneumonia, improving on Zosyn, afebrile since initial temp. Had Pseudomonas on sputum cultures in March, current sputum with rare GNR found to have Normal jose and rare pseudomonas.  CXR with unchanged RLL atelectasis. RVP and urine Legionella negative. CMV PCR, cryptococcus antigen and toxoplasmosis antibody negative. Blood cultures negative to date.   CT chest with increased rounded opacity at LLL.  ?pneumonia vs post obstructive atelectasis.   Loose stool, C diff negative, GI PCR cancelled. Stool culture negative to date.  Reported by EMS to have bedbugs. None seen. Now with right axillary skin nodules, likely inclusion cysts as per derm.  Sputum w/ pseudomonas and now sensitivity reveals intermediate susceptibility to zosyn with carbapenem resistance.  Still remains sensitive to quinolones and cefepime.       Recommend  -continue to follow  Tacrolimus levels   -Discontinued Zosyn 3.375GM IV q8h.  Started cefepime 2 grams IV q12.  Avoiding quinolones b/c of interaction with tacro.  -Mepron for PCP PPX  -monitor ANC and fevers - no more fevers. ANC rising.  -CT chest - increased LLL opacity.  Seems improved..   Consider pulm evaluation.  Fungitell pending.   This could be fungal or bacterial ( the Pseudomonas that was I to ZOsyn)  -Check GI pcr - no diarrhea or bowel movement today 68M s/p cardiac transplant 2/23/18 for NICM, on Tacrolimus, Cellcept and Prednisone and HCV acquired from donor on Mavyret, admitted 6/13/18 for sepsis, 104.2F and tachycardia, neutropenia. Treating as pneumonia, improving on Zosyn, afebrile since initial temp. Had Pseudomonas on sputum cultures in March, current sputum with rare GNR found to have Normal jose and rare pseudomonas.  CXR with unchanged RLL atelectasis. RVP and urine Legionella negative. CMV PCR, cryptococcus antigen and toxoplasmosis antibody negative. Blood cultures negative to date.   CT chest with increased rounded opacity at LLL.  ?pneumonia vs post obstructive atelectasis.   Loose stool, C diff negative, GI PCR cancelled. Stool culture negative to date.  Reported by EMS to have bedbugs. None seen. Now with right axillary skin nodules, likely inclusion cysts as per derm.  Sputum w/ pseudomonas and now sensitivity reveals intermediate susceptibility to zosyn with carbapenem resistance.  Still remains sensitive to quinolones and cefepime.       Recommend  -continue to follow  Tacrolimus levels   -Discontinued Zosyn 3.375GM IV q8h.  Started cefepime 2 grams IV q12.   NOte quinolones can interact with Tacromlimus and increase Qtc, would monitor levels closely  -Mepron for PCP PPX  -monitor ANC and fevers - no more fevers. ANC rising.  -CT chest - increased LLL opacity.  Seems improved..   Consider pulm evaluation.  Fungitell pending.   This could be fungal or bacterial ( the Pseudomonas that was I to ZOsyn)  -Check GI pcr - no diarrhea or bowel movement today  Note the azoles also can prolong Qtc and interact with tacrolimus, would need to monitor tacrolimus levels closlely

## 2018-06-18 NOTE — PROGRESS NOTE ADULT - ASSESSMENT
Mr. 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 He was admitted on 6/13 with severe leukopenia and neutropenic fevers with SIRS and hypotension. He is currently improved, with normal WBC count after receiving 4 doses of Filgastrim, and holding CellCept and valganciclovir. He had acute worsening of his renal function, which has resolved. He has a large left sided lung nodule/opacity of unclear etiology.

## 2018-06-18 NOTE — PROGRESS NOTE ADULT - PROBLEM SELECTOR PLAN 1
Pt with NORMAN in setting of sepsis, now resolved. Pt with stable renal function today, non -oliguric.  Monitor BMP, strict I/o, Avoid nephrotoxcis, NSAIDs, RCA      ** will sign off, please re call as needed

## 2018-06-18 NOTE — PROGRESS NOTE ADULT - PROBLEM SELECTOR PLAN 4
- Concerning finding in setting of severe immunosuppression.   - Plan for CT-guided biopsy by IR.  - Fungitel pending. - Concerning finding in setting of severe immunosuppression.   - Plan for CT-guided biopsy by IR.  - Fungitel pending.  - Prior Quantiferon negative on last admission.

## 2018-06-18 NOTE — PROGRESS NOTE ADULT - PROBLEM SELECTOR PLAN 3
Continue to hold CellCept given presumed infectious process  Tacrolimus dose decreased to 7 mg PO BID. We will aim for trough closer to 10 given severe immunosuppression s/p Rituximab.   Continue prednisone 5 mg PO BID with plan for expedited taper.  We will continue to hold CellCept.   His echo shows borderline reduced LV and RV systolic function. - No sig change in graft function on recent TTE  - CellCept held given recent leukopenia and likely infectious process  - Continue tacrolimus 7 mg BID for goal trough 10-12 for now. If confirmed fungal pneumonia, will reduce target to 8-10.  - Currently on pred 5 BID, but will taper weekly.

## 2018-06-18 NOTE — PROGRESS NOTE ADULT - SUBJECTIVE AND OBJECTIVE BOX
Interval History:    Medications:  aspirin enteric coated 81 milliGRAM(s) Oral daily  atovaquone Suspension 1500 milliGRAM(s) Oral daily  Calcium Citrate+D3 (315mg-250mg/tablet) 2 Tablet(s) 2 Tablet(s) Oral two times a day  cefepime   IVPB 2000 milliGRAM(s) IV Intermittent every 12 hours  famotidine    Tablet 20 milliGRAM(s) Oral daily  insulin glargine Injectable (LANTUS) 7 Unit(s) SubCutaneous at bedtime  insulin lispro Injectable (HumaLOG) 3 Unit(s) SubCutaneous before breakfast  insulin lispro Injectable (HumaLOG) 3 Unit(s) SubCutaneous before lunch  insulin lispro Injectable (HumaLOG) 3 Unit(s) SubCutaneous before dinner  magnesium oxide 400 milliGRAM(s) Oral every 12 hours  Mavyret 100mg/40mg 3 Tablet(s) 3 Tablet(s) Oral daily  multivitamin 1 Tablet(s) Oral daily  pravastatin 20 milliGRAM(s) Oral at bedtime  predniSONE   Tablet 5 milliGRAM(s) Oral two times a day  sodium bicarbonate 650 milliGRAM(s) Oral every 8 hours  sodium chloride 0.9% lock flush 3 milliLiter(s) IV Push every 8 hours  sodium chloride 0.9%. 1000 milliLiter(s) IV Continuous <Continuous>  tacrolimus 7 milliGRAM(s) Oral <User Schedule>  valGANciclovir 450 milliGRAM(s) Oral daily    Vitals:  T(C): 36.7 (06-18-18 @ 03:18), Max: 36.8 (06-17-18 @ 23:24)  HR: 94 (06-18-18 @ 03:18) (94 - 100)  BP: 132/88 (06-18-18 @ 03:18) (126/82 - 135/92)  BP(mean): --  ABP: --  ABP(mean): --  RR: 17 (06-18-18 @ 03:18) (17 - 18)  SpO2: 94% (06-18-18 @ 03:18) (90% - 94%)  Wt(kg): --  CVP(cm H2O): --  CO: --  CI: --  PA: --  PA(mean): --  PCWP: --  SVR: --  PVR: --    Daily     Daily         I&O's Summary    17 Jun 2018 07:01  -  18 Jun 2018 07:00  --------------------------------------------------------  IN: 1160 mL / OUT: 1550 mL / NET: -390 mL    18 Jun 2018 07:01  -  18 Jun 2018 12:35  --------------------------------------------------------  IN: 180 mL / OUT: 625 mL / NET: -445 mL        Physical Exam:  Appearance: No Acute Distress  HEENT: JVP ___ cm H2O, no HJR  Cardiovascular: RRR, Normal S1 S2, No murmurs/rubs/gallops  Respiratory: Clear to auscultation bilaterally  Gastrointestinal: Soft, Non-tender, non-distended	  Skin: no skin lesions  Neurologic: Non-focal  Extremities: No LE edema, warm and well perfused  Psychiatry: A & O x 3, Mood & affect appropriate      Labs:                        9.5    19.0  )-----------( 364      ( 18 Jun 2018 07:24 )             30.9     06-18    141  |  106  |  14  ----------------------------<  74  4.0   |  22  |  0.99    Ca    8.9      18 Jun 2018 07:24              Serum Pro-Brain Natriuretic Peptide: 2146 pg/mL (06-13 @ 17:21)              TELEMETRY:    [ ] Echocardiogram: Interval History: slight cough productive of white sputum, but no CP, not other complaints.    Medications:  aspirin enteric coated 81 milliGRAM(s) Oral daily  atovaquone Suspension 1500 milliGRAM(s) Oral daily  Calcium Citrate+D3 (315mg-250mg/tablet) 2 Tablet(s) 2 Tablet(s) Oral two times a day  cefepime   IVPB 2000 milliGRAM(s) IV Intermittent every 12 hours  famotidine    Tablet 20 milliGRAM(s) Oral daily  insulin glargine Injectable (LANTUS) 7 Unit(s) SubCutaneous at bedtime  insulin lispro Injectable (HumaLOG) 3 Unit(s) SubCutaneous before breakfast  insulin lispro Injectable (HumaLOG) 3 Unit(s) SubCutaneous before lunch  insulin lispro Injectable (HumaLOG) 3 Unit(s) SubCutaneous before dinner  magnesium oxide 400 milliGRAM(s) Oral every 12 hours  Mavyret 100mg/40mg 3 Tablet(s) 3 Tablet(s) Oral daily  multivitamin 1 Tablet(s) Oral daily  pravastatin 20 milliGRAM(s) Oral at bedtime  predniSONE   Tablet 5 milliGRAM(s) Oral two times a day  sodium bicarbonate 650 milliGRAM(s) Oral every 8 hours  sodium chloride 0.9% lock flush 3 milliLiter(s) IV Push every 8 hours  sodium chloride 0.9%. 1000 milliLiter(s) IV Continuous <Continuous>  tacrolimus 7 milliGRAM(s) Oral <User Schedule>  valGANciclovir 450 milliGRAM(s) Oral daily    Vitals:  T(C): 36.7 (06-18-18 @ 03:18), Max: 36.8 (06-17-18 @ 23:24)  HR: 94 (06-18-18 @ 03:18) (94 - 100)  BP: 132/88 (06-18-18 @ 03:18) (126/82 - 135/92)  RR: 17 (06-18-18 @ 03:18) (17 - 18)  SpO2: 94% (06-18-18 @ 03:18) (90% - 94%)          I&O's Summary    17 Jun 2018 07:01  -  18 Jun 2018 07:00  --------------------------------------------------------  IN: 1160 mL / OUT: 1550 mL / NET: -390 mL    18 Jun 2018 07:01  -  18 Jun 2018 12:35  --------------------------------------------------------  IN: 180 mL / OUT: 625 mL / NET: -445 mL        Physical Exam:  Appearance: No Acute Distress  HEENT: JVP <6 cm H2O, no HJR  Cardiovascular: RRR, Normal S1 S2, No murmurs/rubs/gallops  Respiratory: Clear to auscultation bilaterally  Gastrointestinal: Soft, Non-tender, non-distended	  Skin: no skin lesions  Neurologic: Non-focal  Extremities: No LE edema, warm and well perfused  Psychiatry: A & O x 3, Mood & affect appropriate      Labs:                        9.5    19.0  )-----------( 364      ( 18 Jun 2018 07:24 )             30.9     06-18    141  |  106  |  14  ----------------------------<  74  4.0   |  22  |  0.99    Ca    8.9      18 Jun 2018 07:24

## 2018-06-18 NOTE — PROGRESS NOTE ADULT - ASSESSMENT
68yoAAM w/ PMH of [facility] s/p HM2 LVAD 6/2017 then s/p Heart transplant 2/23/18. 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 Pt now presented w/ fevers rigors and cough. Admitted for sepsis, thought 2/2 pneumonia. Renal now called for NORMAN.

## 2018-06-18 NOTE — PROGRESS NOTE ADULT - PROBLEM SELECTOR PLAN 9
Continue atovaquone for PCP prophylaxis.   We will resume valganciclovir 450 mg daily as above. He may need biopsy of his gut to rule out CMV confined to the gut. Continue atovaquone for PCP and toxo prophylaxis.   We will resume valganciclovir 450 mg BID for CMV prophylaxis.

## 2018-06-18 NOTE — PROGRESS NOTE ADULT - SUBJECTIVE AND OBJECTIVE BOX
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  His course also complicated by bilateral IJ/subclavian  thrombi, small  persistent right pleural effusion as well as acute on chronic renal failure. 5/23/18 admitted for hyperglycemia, hyponatremia and acute kidney injury    Admitted to [facility] ED on 6/13/18 with Sepsis, found to have LLL mass/abscess on CT scan.  Pt comes to IR today for biopsy of LLL finding to r/o bacterial and/or fungal etiology      PAST MEDICAL  DVT of upper extremity (deep vein thrombosis)  Hepatitis C virus  GIB (gastrointestinal bleeding)  Ventricular fibrillation: s/p AICD  PAF (paroxysmal atrial fibrillation): on xarelto  Non-Ischemic Cardiomyopathy  SVT (Supraventricular Tachycardia)  HTN  CHF (Congestive Heart Failure)    SURGICAL HISTORY:  H/O heart transplant: 2/2018  LVAD (left ventricular assist device) present  Status post left hip replacement  History of Prior Ablation Treatment: for afib  AICD (Automatic Cardioverter/Defibrillator) Present: [employer] with 1 [employer] lead4/1/09- explanted and replaced with Medtronic 2 leads on 9/2/09      Social History:     FAMILY HISTORY:  No pertinent family history in first degree relatives    Allergies: No Known Allergies    Current Medications: aspirin enteric coated 81 milliGRAM(s) Oral daily  atovaquone Suspension 1500 milliGRAM(s) Oral daily  Calcium Citrate+D3 (315mg-250mg/tablet) 2 Tablet(s) 2 Tablet(s) Oral two times a day  cefepime   IVPB 2000 milliGRAM(s) IV Intermittent every 12 hours  famotidine    Tablet 20 milliGRAM(s) Oral daily  insulin glargine Injectable (LANTUS) 7 Unit(s) SubCutaneous at bedtime  insulin lispro Injectable (HumaLOG) 3 Unit(s) SubCutaneous before breakfast  insulin lispro Injectable (HumaLOG) 3 Unit(s) SubCutaneous before lunch  insulin lispro Injectable (HumaLOG) 3 Unit(s) SubCutaneous before dinner  magnesium oxide 400 milliGRAM(s) Oral every 12 hours  Mavyret 100mg/40mg 3 Tablet(s) 3 Tablet(s) Oral daily  multivitamin 1 Tablet(s) Oral daily  pravastatin 20 milliGRAM(s) Oral at bedtime  predniSONE   Tablet 5 milliGRAM(s) Oral two times a day  sodium bicarbonate 650 milliGRAM(s) Oral every 8 hours  sodium chloride 0.9% lock flush 3 milliLiter(s) IV Push every 8 hours  sodium chloride 0.9%. 1000 milliLiter(s) IV Continuous <Continuous>  tacrolimus 7 milliGRAM(s) Oral <User Schedule>  valGANciclovir 450 milliGRAM(s) Oral two times a day      Labs:                         9.5    19.0  )-----------( 364      ( 18 Jun 2018 07:24 )             30.9       06-18    141  |  106  |  14  ----------------------------<  74  4.0   |  22  |  0.99    Ca    8.9      18 Jun 2018 07:24      Blood Bank:   Blood Available Until:    Assessment/Plan:   This is a 68y Male with above pmhx who presents to IR for biopsy of left lower lung lesion.  Procedure/ risks/ benefits/ goals/ alternatives were explained. All questions answered. Informed content obtained from patient. Consent placed in chart. 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  His course also complicated by bilateral IJ/subclavian  thrombi, small  persistent right pleural effusion as well as acute on chronic renal failure. 5/23/18 admitted for hyperglycemia, hyponatremia and acute kidney injury    Admitted to [facility] ED on 6/13/18 with Sepsis, found to have LLL mass/abscess on CT scan.  Pt comes to IR today for biopsy of LLL finding to r/o bacterial and/or fungal etiology  NPO since 0730 6/18/18    PAST MEDICAL  DVT of upper extremity (deep vein thrombosis)  Hepatitis C virus  GIB (gastrointestinal bleeding)  Ventricular fibrillation: s/p AICD  PAF (paroxysmal atrial fibrillation): on xarelto  Non-Ischemic Cardiomyopathy  SVT (Supraventricular Tachycardia)  HTN  CHF (Congestive Heart Failure)    SURGICAL HISTORY:  H/O heart transplant: 2/2018  LVAD (left ventricular assist device) present  Status post left hip replacement  History of Prior Ablation Treatment: for afib  AICD (Automatic Cardioverter/Defibrillator) Present: [employer] with 1 [employer] lead4/1/09- explanted and replaced with Medtronic 2 leads on 9/2/09      Social History:     FAMILY HISTORY:  No pertinent family history in first degree relatives    Allergies: No Known Allergies    Current Medications: aspirin enteric coated 81 milliGRAM(s) Oral daily  atovaquone Suspension 1500 milliGRAM(s) Oral daily  Calcium Citrate+D3 (315mg-250mg/tablet) 2 Tablet(s) 2 Tablet(s) Oral two times a day  cefepime   IVPB 2000 milliGRAM(s) IV Intermittent every 12 hours  famotidine    Tablet 20 milliGRAM(s) Oral daily  insulin glargine Injectable (LANTUS) 7 Unit(s) SubCutaneous at bedtime  insulin lispro Injectable (HumaLOG) 3 Unit(s) SubCutaneous before breakfast  insulin lispro Injectable (HumaLOG) 3 Unit(s) SubCutaneous before lunch  insulin lispro Injectable (HumaLOG) 3 Unit(s) SubCutaneous before dinner  magnesium oxide 400 milliGRAM(s) Oral every 12 hours  Mavyret 100mg/40mg 3 Tablet(s) 3 Tablet(s) Oral daily  multivitamin 1 Tablet(s) Oral daily  pravastatin 20 milliGRAM(s) Oral at bedtime  predniSONE   Tablet 5 milliGRAM(s) Oral two times a day  sodium bicarbonate 650 milliGRAM(s) Oral every 8 hours  sodium chloride 0.9% lock flush 3 milliLiter(s) IV Push every 8 hours  sodium chloride 0.9%. 1000 milliLiter(s) IV Continuous <Continuous>  tacrolimus 7 milliGRAM(s) Oral <User Schedule>  valGANciclovir 450 milliGRAM(s) Oral two times a day      Labs:                         9.5    19.0  )-----------( 364      ( 18 Jun 2018 07:24 )             30.9       06-18    141  |  106  |  14  ----------------------------<  74  4.0   |  22  |  0.99    Ca    8.9      18 Jun 2018 07:24      Blood Bank:   Blood Available Until:    Assessment/Plan:   This is a 68y Male with above pmhx who presents to IR for biopsy of left lower lung lesion.  Procedure/ risks/ benefits/ goals/ alternatives were explained. All questions answered. Informed content obtained from patient. Consent placed in chart.

## 2018-06-18 NOTE — PROGRESS NOTE ADULT - PROBLEM SELECTOR PLAN 7
Resolved. Unclear etiology. No overt evidence of bleeding. Transfused 1 Unit of PRBCs at admission with appropriate increase in hematocrit.   Hematology consulted to assist in evaluation (i.e. bone marrow suppression vs. hemolysis) Resolved. Unclear etiology. No overt evidence of bleeding. Transfused 1 Unit of PRBCs at admission with appropriate increase in hematocrit.   Hematology consulted to assist in evaluation (i.e. bone marrow suppression vs. hemolysis) and possibly related to Valcyte

## 2018-06-19 LAB
ANION GAP SERPL CALC-SCNC: 14 MMOL/L — SIGNIFICANT CHANGE UP (ref 5–17)
B19V IGG SER-ACNC: 3.8 INDEX — HIGH (ref 0–0.8)
B19V IGG+IGM SER-IMP: POSITIVE
B19V IGG+IGM SER-IMP: SIGNIFICANT CHANGE UP
B19V IGM FLD-ACNC: 0.2 INDEX — SIGNIFICANT CHANGE UP (ref 0–0.8)
B19V IGM SER-ACNC: NEGATIVE — SIGNIFICANT CHANGE UP
BASOPHILS # BLD AUTO: 0 K/UL — SIGNIFICANT CHANGE UP (ref 0–0.2)
BUN SERPL-MCNC: 17 MG/DL — SIGNIFICANT CHANGE UP (ref 7–23)
CALCIUM SERPL-MCNC: 9 MG/DL — SIGNIFICANT CHANGE UP (ref 8.4–10.5)
CHLORIDE SERPL-SCNC: 104 MMOL/L — SIGNIFICANT CHANGE UP (ref 96–108)
CO2 SERPL-SCNC: 23 MMOL/L — SIGNIFICANT CHANGE UP (ref 22–31)
CREAT SERPL-MCNC: 1.18 MG/DL — SIGNIFICANT CHANGE UP (ref 0.5–1.3)
EOSINOPHIL # BLD AUTO: 0.1 K/UL — SIGNIFICANT CHANGE UP (ref 0–0.5)
GLUCOSE BLDC GLUCOMTR-MCNC: 105 MG/DL — HIGH (ref 70–99)
GLUCOSE BLDC GLUCOMTR-MCNC: 120 MG/DL — HIGH (ref 70–99)
GLUCOSE BLDC GLUCOMTR-MCNC: 165 MG/DL — HIGH (ref 70–99)
GLUCOSE BLDC GLUCOMTR-MCNC: 93 MG/DL — SIGNIFICANT CHANGE UP (ref 70–99)
GLUCOSE SERPL-MCNC: 93 MG/DL — SIGNIFICANT CHANGE UP (ref 70–99)
GRAM STN FLD: SIGNIFICANT CHANGE UP
HCT VFR BLD CALC: 29.6 % — LOW (ref 39–50)
HCT VFR BLD CALC: 31.6 % — LOW (ref 39–50)
HGB BLD-MCNC: 10.1 G/DL — LOW (ref 13–17)
HGB BLD-MCNC: 9.2 G/DL — LOW (ref 13–17)
LYMPHOCYTES # BLD AUTO: 2 % — LOW (ref 13–44)
LYMPHOCYTES # BLD AUTO: 2.4 K/UL — SIGNIFICANT CHANGE UP (ref 1–3.3)
MCHC RBC-ENTMCNC: 29.3 PG — SIGNIFICANT CHANGE UP (ref 27–34)
MCHC RBC-ENTMCNC: 30.5 PG — SIGNIFICANT CHANGE UP (ref 27–34)
MCHC RBC-ENTMCNC: 31 GM/DL — LOW (ref 32–36)
MCHC RBC-ENTMCNC: 32.1 GM/DL — SIGNIFICANT CHANGE UP (ref 32–36)
MCV RBC AUTO: 94.3 FL — SIGNIFICANT CHANGE UP (ref 80–100)
MCV RBC AUTO: 94.8 FL — SIGNIFICANT CHANGE UP (ref 80–100)
MONOCYTES # BLD AUTO: 2.3 K/UL — HIGH (ref 0–0.9)
MONOCYTES NFR BLD AUTO: 7 % — SIGNIFICANT CHANGE UP (ref 2–14)
NEUTROPHILS # BLD AUTO: 38.1 K/UL — HIGH (ref 1.8–7.4)
NEUTROPHILS NFR BLD AUTO: 66 % — SIGNIFICANT CHANGE UP (ref 43–77)
NIGHT BLUE STAIN TISS: SIGNIFICANT CHANGE UP
PLATELET # BLD AUTO: 336 K/UL — SIGNIFICANT CHANGE UP (ref 150–400)
PLATELET # BLD AUTO: 351 K/UL — SIGNIFICANT CHANGE UP (ref 150–400)
POTASSIUM SERPL-MCNC: 4.5 MMOL/L — SIGNIFICANT CHANGE UP (ref 3.5–5.3)
POTASSIUM SERPL-SCNC: 4.5 MMOL/L — SIGNIFICANT CHANGE UP (ref 3.5–5.3)
RBC # BLD: 3.14 M/UL — LOW (ref 4.2–5.8)
RBC # BLD: 3.33 M/UL — LOW (ref 4.2–5.8)
RBC # FLD: 19.8 % — HIGH (ref 10.3–14.5)
RBC # FLD: 20.1 % — HIGH (ref 10.3–14.5)
SODIUM SERPL-SCNC: 141 MMOL/L — SIGNIFICANT CHANGE UP (ref 135–145)
SPECIMEN SOURCE: SIGNIFICANT CHANGE UP
SPECIMEN SOURCE: SIGNIFICANT CHANGE UP
TACROLIMUS SERPL-MCNC: 14.4 NG/ML — SIGNIFICANT CHANGE UP
TM INTERPRETATION: SIGNIFICANT CHANGE UP
WBC # BLD: 39.8 K/UL — HIGH (ref 3.8–10.5)
WBC # BLD: 42.9 K/UL — CRITICAL HIGH (ref 3.8–10.5)
WBC # FLD AUTO: 39.8 K/UL — HIGH (ref 3.8–10.5)
WBC # FLD AUTO: 42.9 K/UL — CRITICAL HIGH (ref 3.8–10.5)

## 2018-06-19 PROCEDURE — 99223 1ST HOSP IP/OBS HIGH 75: CPT | Mod: GC

## 2018-06-19 PROCEDURE — 99232 SBSQ HOSP IP/OBS MODERATE 35: CPT

## 2018-06-19 PROCEDURE — 99231 SBSQ HOSP IP/OBS SF/LOW 25: CPT

## 2018-06-19 PROCEDURE — 99233 SBSQ HOSP IP/OBS HIGH 50: CPT

## 2018-06-19 RX ORDER — TACROLIMUS 5 MG/1
2 CAPSULE ORAL
Qty: 0 | Refills: 0 | Status: DISCONTINUED | OUTPATIENT
Start: 2018-06-19 | End: 2018-06-19

## 2018-06-19 RX ORDER — VORICONAZOLE 10 MG/ML
400 INJECTION, POWDER, LYOPHILIZED, FOR SOLUTION INTRAVENOUS EVERY 12 HOURS
Qty: 0 | Refills: 0 | Status: COMPLETED | OUTPATIENT
Start: 2018-06-19 | End: 2018-06-19

## 2018-06-19 RX ORDER — TACROLIMUS 5 MG/1
2 CAPSULE ORAL
Qty: 0 | Refills: 0 | Status: DISCONTINUED | OUTPATIENT
Start: 2018-06-19 | End: 2018-06-21

## 2018-06-19 RX ORDER — VORICONAZOLE 10 MG/ML
200 INJECTION, POWDER, LYOPHILIZED, FOR SOLUTION INTRAVENOUS EVERY 12 HOURS
Qty: 0 | Refills: 0 | Status: DISCONTINUED | OUTPATIENT
Start: 2018-06-20 | End: 2018-06-25

## 2018-06-19 RX ADMIN — TACROLIMUS 2 MILLIGRAM(S): 5 CAPSULE ORAL at 20:02

## 2018-06-19 RX ADMIN — FAMOTIDINE 20 MILLIGRAM(S): 10 INJECTION INTRAVENOUS at 12:21

## 2018-06-19 RX ADMIN — Medication 20 MILLIGRAM(S): at 21:53

## 2018-06-19 RX ADMIN — ATOVAQUONE 1500 MILLIGRAM(S): 750 SUSPENSION ORAL at 12:30

## 2018-06-19 RX ADMIN — INSULIN GLARGINE 7 UNIT(S): 100 INJECTION, SOLUTION SUBCUTANEOUS at 21:53

## 2018-06-19 RX ADMIN — Medication 5 MILLIGRAM(S): at 17:01

## 2018-06-19 RX ADMIN — Medication 5 MILLIGRAM(S): at 06:07

## 2018-06-19 RX ADMIN — VALGANCICLOVIR 450 MILLIGRAM(S): 450 TABLET, FILM COATED ORAL at 06:07

## 2018-06-19 RX ADMIN — VORICONAZOLE 400 MILLIGRAM(S): 10 INJECTION, POWDER, LYOPHILIZED, FOR SOLUTION INTRAVENOUS at 17:00

## 2018-06-19 RX ADMIN — Medication 650 MILLIGRAM(S): at 16:58

## 2018-06-19 RX ADMIN — SODIUM CHLORIDE 3 MILLILITER(S): 9 INJECTION INTRAMUSCULAR; INTRAVENOUS; SUBCUTANEOUS at 12:30

## 2018-06-19 RX ADMIN — Medication 650 MILLIGRAM(S): at 21:52

## 2018-06-19 RX ADMIN — CEFEPIME 100 MILLIGRAM(S): 1 INJECTION, POWDER, FOR SOLUTION INTRAMUSCULAR; INTRAVENOUS at 06:09

## 2018-06-19 RX ADMIN — Medication 1: at 16:57

## 2018-06-19 RX ADMIN — VORICONAZOLE 400 MILLIGRAM(S): 10 INJECTION, POWDER, LYOPHILIZED, FOR SOLUTION INTRAVENOUS at 09:40

## 2018-06-19 RX ADMIN — SODIUM CHLORIDE 3 MILLILITER(S): 9 INJECTION INTRAMUSCULAR; INTRAVENOUS; SUBCUTANEOUS at 21:10

## 2018-06-19 RX ADMIN — SODIUM CHLORIDE 3 MILLILITER(S): 9 INJECTION INTRAMUSCULAR; INTRAVENOUS; SUBCUTANEOUS at 06:10

## 2018-06-19 RX ADMIN — CEFEPIME 100 MILLIGRAM(S): 1 INJECTION, POWDER, FOR SOLUTION INTRAMUSCULAR; INTRAVENOUS at 17:02

## 2018-06-19 RX ADMIN — Medication 81 MILLIGRAM(S): at 12:30

## 2018-06-19 RX ADMIN — Medication 3 UNIT(S): at 12:17

## 2018-06-19 RX ADMIN — Medication 3 UNIT(S): at 08:30

## 2018-06-19 RX ADMIN — Medication 650 MILLIGRAM(S): at 06:07

## 2018-06-19 RX ADMIN — TACROLIMUS 2 MILLIGRAM(S): 5 CAPSULE ORAL at 08:52

## 2018-06-19 RX ADMIN — MAGNESIUM OXIDE 400 MG ORAL TABLET 400 MILLIGRAM(S): 241.3 TABLET ORAL at 16:59

## 2018-06-19 RX ADMIN — Medication 1 TABLET(S): at 12:21

## 2018-06-19 RX ADMIN — Medication 3 UNIT(S): at 16:57

## 2018-06-19 RX ADMIN — VALGANCICLOVIR 450 MILLIGRAM(S): 450 TABLET, FILM COATED ORAL at 17:00

## 2018-06-19 RX ADMIN — MAGNESIUM OXIDE 400 MG ORAL TABLET 400 MILLIGRAM(S): 241.3 TABLET ORAL at 06:07

## 2018-06-19 NOTE — CONSULT NOTE ADULT - ATTENDING COMMENTS
I have seen and examined the patient. I agree with the above history, physical exam, and plan of care except for as detailed below.    67 y/o M w/NICM s/p OHT 2/2018 now presenting with sepsis and neutropenic fever. Patient being treated with broad spectrum abx and antifungals with symptomatic improvement. Patient now has leukocytosis following filgrastim. Patient underwent CT chest showing enlarging left lower lobe mass like consolidation with concern for possible infection. Suspect this is rounded atelectasis, however infection is a possibility. Unlikely malignancy due to rapid growth and lesion was not present on imaging back in March. Patient had CT guided biopsy of lesion yesterday to evaluate for potential infectious etiology. At this time there is no indication for bronchoscopy.    - Follow up culture and path results from CT guided biopsy  - Abx and antifungals as per ID  - Ambulation, incentive spirometry, and chest PT  - Will need repeat imaging in 6-8 weeks to ensure resolution

## 2018-06-19 NOTE — CONSULT NOTE ADULT - SUBJECTIVE AND OBJECTIVE BOX
CHIEF COMPLAINT:      HPI:  68 M with NIC s/p heart transplant 2/23/2018 with complicated post-operative course including graft dysfunction requiring plasmapheresis and IVIg, bilateral IJ/subclavian thrombi, and acute on chronic renal failure who presents this admission with two days of dyspnea, fevers, rigors, and productive cough with clear sputum. Of note, was recently admitted here 5/2016 for renal failure. Prior to onset of symptoms, patient         PAST MEDICAL & SURGICAL HISTORY:  DVT of upper extremity (deep vein thrombosis)  Hepatitis C virus  GIB (gastrointestinal bleeding)  Ventricular fibrillation: s/p AICD  PAF (paroxysmal atrial fibrillation): on xarelto  Non-Ischemic Cardiomyopathy  SVT (Supraventricular Tachycardia)  HTN  CHF (Congestive Heart Failure)  H/O heart transplant: 2/2018  LVAD (left ventricular assist device) present  Status post left hip replacement  History of Prior Ablation Treatment: for afib  AICD (Automatic Cardioverter/Defibrillator) Present: [employer] with 1 [employer] lead4/1/09- explanted and replaced with Medtronic 2 leads on 9/2/09      FAMILY HISTORY:  No pertinent family history in first degree relatives      SOCIAL HISTORY:  Smoking: [ ] Never Smoked [ ] Former Smoker (__ packs x ___ years) [ ] Current Smoker  (__ packs x ___ years)  Substance Use: [ ] Never Used [ ] Used ____  EtOH Use:  Marital Status: [ ] Single [ ]  [ ]  [ ]   Sexual History:   Occupation:  Recent Travel:  Country of Birth:  Advance Directives:    Allergies    No Known Allergies    Intolerances        HOME MEDICATIONS:    REVIEW OF SYSTEMS:  Constitutional: [ ] negative [ ] fevers [ ] chills [ ] weight loss [ ] weight gain  HEENT: [ ] negative [ ] dry eyes [ ] eye irritation [ ] postnasal drip [ ] nasal congestion  CV: [ ] negative  [ ] chest pain [ ] orthopnea [ ] palpitations [ ] murmur  Resp: [ ] negative [ ] cough [ ] shortness of breath [ ] dyspnea [ ] wheezing [ ] sputum [ ] hemoptysis  GI: [ ] negative [ ] nausea [ ] vomiting [ ] diarrhea [ ] constipation [ ] abd pain [ ] dysphagia   : [ ] negative [ ] dysuria [ ] nocturia [ ] hematuria [ ] increased urinary frequency  Musculoskeletal: [ ] negative [ ] back pain [ ] myalgias [ ] arthralgias [ ] fracture  Skin: [ ] negative [ ] rash [ ] itch  Neurological: [ ] negative [ ] headache [ ] dizziness [ ] syncope [ ] weakness [ ] numbness  Psychiatric: [ ] negative [ ] anxiety [ ] depression  Endocrine: [ ] negative [ ] diabetes [ ] thyroid problem  Hematologic/Lymphatic: [ ] negative [ ] anemia [ ] bleeding problem  Allergic/Immunologic: [ ] negative [ ] itchy eyes [ ] nasal discharge [ ] hives [ ] angioedema  [ ] All other systems negative  [ ] Unable to assess ROS because ________    OBJECTIVE:  ICU Vital Signs Last 24 Hrs  T(C): 36.7 (19 Jun 2018 12:19), Max: 36.7 (19 Jun 2018 05:42)  T(F): 98.1 (19 Jun 2018 12:19), Max: 98.1 (19 Jun 2018 12:19)  HR: 104 (19 Jun 2018 12:19) (74 - 104)  BP: 120/84 (19 Jun 2018 12:19) (117/81 - 137/85)  BP(mean): 104 (18 Jun 2018 22:33) (104 - 104)  ABP: --  ABP(mean): --  RR: 18 (19 Jun 2018 12:19) (18 - 20)  SpO2: 94% (19 Jun 2018 12:19) (94% - 97%)        06-18 @ 07:01 - 06-19 @ 07:00  --------------------------------------------------------  IN: 180 mL / OUT: 976 mL / NET: -796 mL    06-19 @ 07:01  - 06-19 @ 14:52  --------------------------------------------------------  IN: 320 mL / OUT: 275 mL / NET: 45 mL      CAPILLARY BLOOD GLUCOSE      POCT Blood Glucose.: 93 mg/dL (19 Jun 2018 11:30)      PHYSICAL EXAM:  General:   HEENT:   Lymph Nodes:  Neck:   Respiratory:   Cardiovascular:   Abdomen:   Extremities:   Skin:   Neurological:  Psychiatry:    HOSPITAL MEDICATIONS:  aspirin enteric coated 81 milliGRAM(s) Oral daily    atovaquone Suspension 1500 milliGRAM(s) Oral daily  cefepime   IVPB 2000 milliGRAM(s) IV Intermittent every 12 hours  valGANciclovir 450 milliGRAM(s) Oral two times a day  voriconazole 400 milliGRAM(s) Oral every 12 hours      insulin glargine Injectable (LANTUS) 7 Unit(s) SubCutaneous at bedtime  insulin lispro (HumaLOG) corrective regimen sliding scale   SubCutaneous Before meals and at bedtime  insulin lispro Injectable (HumaLOG) 3 Unit(s) SubCutaneous before breakfast  insulin lispro Injectable (HumaLOG) 3 Unit(s) SubCutaneous before lunch  insulin lispro Injectable (HumaLOG) 3 Unit(s) SubCutaneous before dinner  pravastatin 20 milliGRAM(s) Oral at bedtime  predniSONE   Tablet 5 milliGRAM(s) Oral two times a day        famotidine    Tablet 20 milliGRAM(s) Oral daily        magnesium oxide 400 milliGRAM(s) Oral every 12 hours  multivitamin 1 Tablet(s) Oral daily  sodium bicarbonate 650 milliGRAM(s) Oral every 8 hours  sodium chloride 0.9% lock flush 3 milliLiter(s) IV Push every 8 hours  sodium chloride 0.9%. 1000 milliLiter(s) IV Continuous <Continuous>    tacrolimus 2 milliGRAM(s) Oral <User Schedule>      Calcium Citrate+D3 (315mg-250mg/tablet) 2 Tablet(s) 2 Tablet(s) Oral two times a day  Mavyret 100mg/40mg 3 Tablet(s) 3 Tablet(s) Oral daily      LABS:                        10.1   42.9  )-----------( 351      ( 19 Jun 2018 09:00 )             31.6     Hgb Trend: 10.1<--, 9.2<--, 9.5<--, 9.7<--, 8.6<--  06-19    141  |  104  |  17  ----------------------------<  93  4.5   |  23  |  1.18    Ca    9.0      19 Jun 2018 07:45      Creatinine Trend: 1.18<--, 0.99<--, 1.07<--, 1.40<--, 1.62<--, 1.22<--            MICROBIOLOGY:     RADIOLOGY:  [ ] Reviewed and interpreted by me    PULMONARY FUNCTION TESTS:    EKG: CHIEF COMPLAINT:  Fevers, rigors, dyspnea x 2 days    HPI:  68 M with NICCM s/p heart transplant 2/23/2018 with complicated post-operative course including graft dysfunction requiring plasmapheresis and IVIg, bilateral IJ/subclavian thrombi, and acute on chronic renal failure who presents this admission with two days of dyspnea, fevers, rigors, and productive cough with clear sputum. Of note, was recently admitted here 5/2016 for renal failure. Prior to onset of symptoms, patient was feeling well. Relatively active, ambulating with walker. He was septic at time of presentation. Received fluids, vancomycin, and zosyn. Was on Zosyn from 6/19 - 6/17, which was changed to cefepime based on sputum culture sensitivities growing pseudomonas. CT chest reveals a LLL mass-like consolidation seen on prior CT chest, although larger in size, from early 6/2018. He underwent CT-guided biopsy 6/18. Currently on Cefepime and Voriconazole for empiric fungal coverage given elevated white count. Patient initially was neutropenic but now with leukocytosis (40s) following recent administration of Neupogen. Since admission, patient feels better. He has been afebrile for past several days. Cough improved. No dyspnea. Saturating well on room air.      PAST MEDICAL & SURGICAL HISTORY:  DVT of upper extremity (deep vein thrombosis)  Hepatitis C virus  GIB (gastrointestinal bleeding)  Ventricular fibrillation: s/p AICD  PAF (paroxysmal atrial fibrillation): on xarelto  Non-Ischemic Cardiomyopathy  SVT (Supraventricular Tachycardia)  HTN  CHF (Congestive Heart Failure)  H/O heart transplant: 2/2018  LVAD (left ventricular assist device) present  Status post left hip replacement  History of Prior Ablation Treatment: for afib  AICD (Automatic Cardioverter/Defibrillator) Present: [employer] with 1 [employer] lead4/1/09- explanted and replaced with Medtronic 2 leads on 9/2/09      FAMILY HISTORY:  No pertinent family history in first degree relatives      SOCIAL HISTORY:  Smoking: [ ] Never Smoked [X] Former Smoker (__ packs x ___ years) [ ] Current Smoker  (__ packs x ___ years)  Substance Use: Denies  EtOH Use: Denies  Marital Status: [X] Single [ ]  [ ]  [ ]   Sexual History:   Occupation:  Recent Travel:  Country of Birth: [place]  Advance Directives:    Allergies    No Known Allergies    Intolerances        HOME MEDICATIONS:    REVIEW OF SYSTEMS:  Constitutional: [ ] negative [-] fevers [-] chills [ ] weight loss [ ] weight gain  HEENT: [ ] negative [ ] dry eyes [ ] eye irritation [ ] postnasal drip [ ] nasal congestion  CV: [ ] negative  [-] chest pain [-] orthopnea [ ] palpitations [ ] murmur  Resp: [ ] negative [-] cough [ ] shortness of breath [ ] dyspnea [ ] wheezing [ ] sputum [ ] hemoptysis  GI: [ ] negative [-] nausea [-] vomiting [-] diarrhea (1 episode but now resolved) [ ] constipation [-] abd pain [ ] dysphagia   : [-] negative [ ] dysuria [ ] nocturia [ ] hematuria [ ] increased urinary frequency  Musculoskeletal: [-] negative [ ] back pain [ ] myalgias [ ] arthralgias [ ] fracture  Skin: - ] negative [ ] rash [ ] itch  Neurological: [-] negative [ ] headache [ ] dizziness [ ] syncope [ ] weakness [ ] numbness  Psychiatric: [ ] negative [ ] anxiety [ ] depression  Endocrine: [ ] negative [ ] diabetes [ ] thyroid problem  Hematologic/Lymphatic: [ ] negative [ ] anemia [ ] bleeding problem  Allergic/Immunologic: [ ] negative [ ] itchy eyes [ ] nasal discharge [ ] hives [ ] angioedema  [ ] All other systems negative  [ ] Unable to assess ROS because ________    OBJECTIVE:  ICU Vital Signs Last 24 Hrs  T(C): 36.7 (19 Jun 2018 12:19), Max: 36.7 (19 Jun 2018 05:42)  T(F): 98.1 (19 Jun 2018 12:19), Max: 98.1 (19 Jun 2018 12:19)  HR: 104 (19 Jun 2018 12:19) (74 - 104)  BP: 120/84 (19 Jun 2018 12:19) (117/81 - 137/85)  BP(mean): 104 (18 Jun 2018 22:33) (104 - 104)  ABP: --  ABP(mean): --  RR: 18 (19 Jun 2018 12:19) (18 - 20)  SpO2: 94% (19 Jun 2018 12:19) (94% - 97%)        06-18 @ 07:01  -  06-19 @ 07:00  --------------------------------------------------------  IN: 180 mL / OUT: 976 mL / NET: -796 mL    06-19 @ 07:01  - 06-19 @ 14:52  --------------------------------------------------------  IN: 320 mL / OUT: 275 mL / NET: 45 mL      CAPILLARY BLOOD GLUCOSE      POCT Blood Glucose.: 93 mg/dL (19 Jun 2018 11:30)      PHYSICAL EXAM:  General: Well appearing, no distress  Lymph Nodes: No LAD  Neck: Supple  Respiratory: CTAB  Cardiovascular: RRR, no murmur  Abdomen: Soft, nontender  Extremities: No edema  Skin: Intact  Neurological: A&Ox3  Psychiatry: Normal mood and affect    HOSPITAL MEDICATIONS:  aspirin enteric coated 81 milliGRAM(s) Oral daily    atovaquone Suspension 1500 milliGRAM(s) Oral daily  cefepime   IVPB 2000 milliGRAM(s) IV Intermittent every 12 hours  valGANciclovir 450 milliGRAM(s) Oral two times a day  voriconazole 400 milliGRAM(s) Oral every 12 hours      insulin glargine Injectable (LANTUS) 7 Unit(s) SubCutaneous at bedtime  insulin lispro (HumaLOG) corrective regimen sliding scale   SubCutaneous Before meals and at bedtime  insulin lispro Injectable (HumaLOG) 3 Unit(s) SubCutaneous before breakfast  insulin lispro Injectable (HumaLOG) 3 Unit(s) SubCutaneous before lunch  insulin lispro Injectable (HumaLOG) 3 Unit(s) SubCutaneous before dinner  pravastatin 20 milliGRAM(s) Oral at bedtime  predniSONE   Tablet 5 milliGRAM(s) Oral two times a day        famotidine    Tablet 20 milliGRAM(s) Oral daily        magnesium oxide 400 milliGRAM(s) Oral every 12 hours  multivitamin 1 Tablet(s) Oral daily  sodium bicarbonate 650 milliGRAM(s) Oral every 8 hours  sodium chloride 0.9% lock flush 3 milliLiter(s) IV Push every 8 hours  sodium chloride 0.9%. 1000 milliLiter(s) IV Continuous <Continuous>    tacrolimus 2 milliGRAM(s) Oral <User Schedule>      Calcium Citrate+D3 (315mg-250mg/tablet) 2 Tablet(s) 2 Tablet(s) Oral two times a day  Mavyret 100mg/40mg 3 Tablet(s) 3 Tablet(s) Oral daily      LABS:                        10.1   42.9  )-----------( 351      ( 19 Jun 2018 09:00 )             31.6     Hgb Trend: 10.1<--, 9.2<--, 9.5<--, 9.7<--, 8.6<--  06-19    141  |  104  |  17  ----------------------------<  93  4.5   |  23  |  1.18    Ca    9.0      19 Jun 2018 07:45      Creatinine Trend: 1.18<--, 0.99<--, 1.07<--, 1.40<--, 1.62<--, 1.22<--            MICROBIOLOGY:   Culture - Sputum . (06.13.18 @ 21:58)    -  Amikacin: S <=8    -  Ceftazidime: S 8    -  Gentamicin: S 4    -  Cefepime: S 4    -  Piperacillin/Tazobactam: I 32    -  Tobramycin: S <=2    -  Aztreonam: S 8    -  Ciprofloxacin: S <=0.5    -  Imipenem: R >8    Gram Stain:   Rare polymorphonuclear leukocytes per low power field  No Squamous epithelial cells per low power field  Rare Gram Negative Rods per oil power field    -  Levofloxacin: S <=1    -  Meropenem: R >8    Specimen Source: .Sputum Sputum    Culture Results:   Rare Pseudomonas aeruginosa (Carbapenem Resistant)  Normal Respiratory [place] present    Organism Identification: Pseudomonas aeruginosa (Carbapenem Resistant)    Organism: Pseudomonas aeruginosa (Carbapenem Resistant)    Method Type: KAMILA      RADIOLOGY:  [x] Reviewed and interpreted by me    ASSESSMENT AND RECOMMENDATION    68 M with [facility] s/p heart transplant 2/23/2018 presents with two days of dyspnea, fevers, rigors, and productive cough with clear sputum. Initially neutropenic, etiology probably multifactorial given sepsis and immunocompromising medications. Now with leukocytosis following Neupogen. CT chest shows LLL mass-like consolidation as well as consolidation in the RLL. The LLL finding was present in early June but has rapidly grown in size. Differential includes infectious (bacterial, fungal), rounded atelectasis, and pulmonary infarct (less likely).     Would continue cefepime and voriconazole for empiric fungal coverage  Follow up results of biopsy  Incentive spirometry  OOB to chair and ambulation as tolerates  No role of bronchoscopy at this time  Will need follow up imaging in 6-8 weeks following abx    Nigel Riley MD  Pulmonary and Critical Care Fellow  [phone]

## 2018-06-19 NOTE — PROGRESS NOTE ADULT - SUBJECTIVE AND OBJECTIVE BOX
Interval History: s/p CT-guided biopsy of LLL lung mass yesterday in IR. Tolerated it well. No complaints this am.    Medications:  aspirin enteric coated 81 milliGRAM(s) Oral daily  atovaquone Suspension 1500 milliGRAM(s) Oral daily  Calcium Citrate+D3 (315mg-250mg/tablet) 2 Tablet(s) 2 Tablet(s) Oral two times a day  cefepime   IVPB 2000 milliGRAM(s) IV Intermittent every 12 hours  famotidine    Tablet 20 milliGRAM(s) Oral daily  insulin glargine Injectable (LANTUS) 7 Unit(s) SubCutaneous at bedtime  insulin lispro (HumaLOG) corrective regimen sliding scale   SubCutaneous Before meals and at bedtime  insulin lispro Injectable (HumaLOG) 3 Unit(s) SubCutaneous before breakfast  insulin lispro Injectable (HumaLOG) 3 Unit(s) SubCutaneous before lunch  insulin lispro Injectable (HumaLOG) 3 Unit(s) SubCutaneous before dinner  magnesium oxide 400 milliGRAM(s) Oral every 12 hours  Mavyret 100mg/40mg 3 Tablet(s) 3 Tablet(s) Oral daily  multivitamin 1 Tablet(s) Oral daily  pravastatin 20 milliGRAM(s) Oral at bedtime  predniSONE   Tablet 5 milliGRAM(s) Oral two times a day  sodium bicarbonate 650 milliGRAM(s) Oral every 8 hours  sodium chloride 0.9% lock flush 3 milliLiter(s) IV Push every 8 hours  sodium chloride 0.9%. 1000 milliLiter(s) IV Continuous <Continuous>  tacrolimus 2 milliGRAM(s) Oral <User Schedule>  valGANciclovir 450 milliGRAM(s) Oral two times a day  voriconazole 400 milliGRAM(s) Oral every 12 hours x 2 doses      Vital Signs Last 24 Hrs  T(C): 36.7 (2018 05:42), Max: 36.8 (2018 13:31)  T(F): 98 (2018 05:42), Max: 98.2 (2018 13:31)  HR: 90 (2018 05:42) (74 - 98)  BP: 132/91 (2018 05:42) (117/81 - 137/85)  BP(mean): 104 (2018 22:33) (104 - 104)  RR: 18 (2018 05:42) (18 - 20)  SpO2: 95% (2018 05:42) (94% - 97%)      Daily     Daily Weight in k (2018 10:57)        I&O's Summary    2018 07:01  -  2018 07:00  --------------------------------------------------------  IN: 180 mL / OUT: 976 mL / NET: -796 mL    2018 07:01  -  2018 12:11  --------------------------------------------------------  IN: 200 mL / OUT: 275 mL / NET: -75 mL        Physical Exam:  Appearance: No Acute Distress  HEENT: JVP ___ cm H2O, no HJR  Cardiovascular: RRR, Normal S1 S2, No murmurs/rubs/gallops  Respiratory: Clear to auscultation bilaterally  Gastrointestinal: Soft, Non-tender, non-distended	  Skin: no skin lesions  Neurologic: Non-focal  Extremities: No LE edema, warm and well perfused  Psychiatry: A & O x 3, Mood & affect appropriate      Labs:                        10.1   42.9  )-----------( 351      ( 2018 09:00 )             31.6     06-19    141  |  104  |  17  ----------------------------<  93  4.5   |  23  |  1.18    Ca    9.0      2018 07:45              Serum Pro-Brain Natriuretic Peptide: 2146 pg/mL ( @ 17:21)              TELEMETRY:    [ ] Echocardiogram: Interval History: s/p CT-guided biopsy of LLL lung mass yesterday in IR. Tolerated it well. No complaints this am.    Medications:  aspirin enteric coated 81 milliGRAM(s) Oral daily  atovaquone Suspension 1500 milliGRAM(s) Oral daily  Calcium Citrate+D3 (315mg-250mg/tablet) 2 Tablet(s) 2 Tablet(s) Oral two times a day  cefepime   IVPB 2000 milliGRAM(s) IV Intermittent every 12 hours  famotidine    Tablet 20 milliGRAM(s) Oral daily  insulin glargine Injectable (LANTUS) 7 Unit(s) SubCutaneous at bedtime  insulin lispro (HumaLOG) corrective regimen sliding scale   SubCutaneous Before meals and at bedtime  insulin lispro Injectable (HumaLOG) 3 Unit(s) SubCutaneous before breakfast  insulin lispro Injectable (HumaLOG) 3 Unit(s) SubCutaneous before lunch  insulin lispro Injectable (HumaLOG) 3 Unit(s) SubCutaneous before dinner  magnesium oxide 400 milliGRAM(s) Oral every 12 hours  Mavyret 100mg/40mg 3 Tablet(s) 3 Tablet(s) Oral daily  multivitamin 1 Tablet(s) Oral daily  pravastatin 20 milliGRAM(s) Oral at bedtime  predniSONE   Tablet 5 milliGRAM(s) Oral two times a day  sodium bicarbonate 650 milliGRAM(s) Oral every 8 hours  sodium chloride 0.9% lock flush 3 milliLiter(s) IV Push every 8 hours  sodium chloride 0.9%. 1000 milliLiter(s) IV Continuous <Continuous>  tacrolimus 7 mg 0800   tacrolimus 2 milliGRAM(s) Oral Q12h  valGANciclovir 450 milliGRAM(s) Oral two times a day  voriconazole 400 milliGRAM(s) Oral every 12 hours x 2 doses then 200 mg Q12h      Vital Signs Last 24 Hrs  T(C): 36.7 (2018 05:42), Max: 36.8 (2018 13:31)  T(F): 98 (2018 05:42), Max: 98.2 (2018 13:31)  HR: 90 (2018 05:42) (74 - 98)  BP: 132/91 (2018 05:42) (117/81 - 137/85)  BP(mean): 104 (2018 22:33) (104 - 104)  RR: 18 (2018 05:42) (18 - 20)  SpO2: 95% (2018 05:42) (94% - 97%)      Daily     Daily Weight in k (2018 10:57)        I&O's Summary    2018 07:01  -  2018 07:00  --------------------------------------------------------  IN: 180 mL / OUT: 976 mL / NET: -796 mL    2018 07:01  -  2018 12:11  --------------------------------------------------------  IN: 200 mL / OUT: 275 mL / NET: -75 mL        Physical Exam:  Appearance: No Acute Distress  HEENT: JVP <6 cm H2O, no HJR  Cardiovascular: RRR, Normal S1 S2, No murmurs/rubs/gallops  Respiratory: Clear to auscultation bilaterally  Gastrointestinal: Soft, Non-tender, non-distended	  Skin: no skin lesions  Neurologic: Non-focal  Extremities: No LE edema, warm and well perfused  Psychiatry: A & O x 3, Mood & affect appropriate      Labs:                        10.1   42.9  )-----------( 351      ( 2018 09:00 )             31.6     06-19    141  |  104  |  17  ----------------------------<  93  4.5   |  23  |  1.18    Ca    9.0      2018 07:45 Interval History: s/p CT-guided biopsy of LLL lung mass yesterday in IR. Tolerated it well. No complaints this am.    Medications:  aspirin enteric coated 81 milliGRAM(s) Oral daily  atovaquone Suspension 1500 milliGRAM(s) Oral daily  Calcium Citrate+D3 (315mg-250mg/tablet) 2 Tablet(s) 2 Tablet(s) Oral two times a day  cefepime   IVPB 2000 milliGRAM(s) IV Intermittent every 12 hours  famotidine    Tablet 20 milliGRAM(s) Oral daily  insulin glargine Injectable (LANTUS) 7 Unit(s) SubCutaneous at bedtime  insulin lispro (HumaLOG) corrective regimen sliding scale   SubCutaneous Before meals and at bedtime  insulin lispro Injectable (HumaLOG) 3 Unit(s) SubCutaneous before breakfast  insulin lispro Injectable (HumaLOG) 3 Unit(s) SubCutaneous before lunch  insulin lispro Injectable (HumaLOG) 3 Unit(s) SubCutaneous before dinner  magnesium oxide 400 milliGRAM(s) Oral every 12 hours  Mavyret 100mg/40mg 3 Tablet(s) 3 Tablet(s) Oral daily  multivitamin 1 Tablet(s) Oral daily  pravastatin 20 milliGRAM(s) Oral at bedtime  predniSONE   Tablet 5 milliGRAM(s) Oral two times a day  sodium bicarbonate 650 milliGRAM(s) Oral every 8 hours  sodium chloride 0.9% lock flush 3 milliLiter(s) IV Push every 8 hours  sodium chloride 0.9%. 1000 milliLiter(s) IV Continuous <Continuous>  tacrolimus 7 mg 0800   tacrolimus 2 milliGRAM(s) Oral Q12h  valGANciclovir 450 milliGRAM(s) Oral two times a day  voriconazole 400 milliGRAM(s) Oral every 12 hours x 2 doses then 200 mg Q12h      Vital Signs Last 24 Hrs  T(C): 36.7 (2018 05:42), Max: 36.8 (2018 13:31)  T(F): 98 (2018 05:42), Max: 98.2 (2018 13:31)  HR: 90 (2018 05:42) (74 - 98)  BP: 132/91 (2018 05:42) (117/81 - 137/85)  BP(mean): 104 (2018 22:33) (104 - 104)  RR: 18 (2018 05:42) (18 - 20)  SpO2: 95% (2018 05:42) (94% - 97%)      Daily     Daily Weight in k (2018 10:57)        I&O's Summary    2018 07:01  -  2018 07:00  --------------------------------------------------------  IN: 180 mL / OUT: 976 mL / NET: -796 mL    2018 07:01  -  2018 12:11  --------------------------------------------------------  IN: 200 mL / OUT: 275 mL / NET: -75 mL        Physical Exam:  Appearance: No Acute Distress  HEENT: JVP <6 cm H2O, no HJR  Cardiovascular: RRR, Normal S1 S2, No murmurs/rubs/gallops  Respiratory: Clear to auscultation bilaterally  Gastrointestinal: Soft, Non-tender, non-distended	  Skin: no skin lesions  Neurologic: Non-focal  Extremities: No LE edema, warm and well perfused  Psychiatry: A & O x 3, Mood & affect appropriate      Labs:                        10.1   42.9  )-----------( 351      ( 2018 09:00 )             31.6     06-19    141  |  104  |  17  ----------------------------<  93  4.5   |  23  |  1.18    Ca    9.0      2018 07:45    Tacrolimus trough 14.4 Interval History: s/p CT-guided biopsy of LLL lung mass yesterday in IR. Tolerated it well. No complaints this am.    Medications:  aspirin enteric coated 81 milliGRAM(s) Oral daily  atovaquone Suspension 1500 milliGRAM(s) Oral daily  Calcium Citrate+D3 (315mg-250mg/tablet) 2 Tablet(s) 2 Tablet(s) Oral two times a day  cefepime   IVPB 2000 milliGRAM(s) IV Intermittent every 12 hours  famotidine    Tablet 20 milliGRAM(s) Oral daily  insulin glargine Injectable (LANTUS) 7 Unit(s) SubCutaneous at bedtime  insulin lispro (HumaLOG) corrective regimen sliding scale   SubCutaneous Before meals and at bedtime  insulin lispro Injectable (HumaLOG) 3 Unit(s) SubCutaneous before breakfast  insulin lispro Injectable (HumaLOG) 3 Unit(s) SubCutaneous before lunch  insulin lispro Injectable (HumaLOG) 3 Unit(s) SubCutaneous before dinner  magnesium oxide 400 milliGRAM(s) Oral every 12 hours  Mavyret 100mg/40mg 3 Tablet(s) 3 Tablet(s) Oral daily  multivitamin 1 Tablet(s) Oral daily  pravastatin 20 milliGRAM(s) Oral at bedtime  predniSONE   Tablet 5 milliGRAM(s) Oral two times a day  sodium bicarbonate 650 milliGRAM(s) Oral every 8 hours  sodium chloride 0.9% lock flush 3 milliLiter(s) IV Push every 8 hours  sodium chloride 0.9%. 1000 milliLiter(s) IV Continuous <Continuous>  tacrolimus 2 milliGRAM(s) Oral Q12h  valGANciclovir 450 milliGRAM(s) Oral two times a day  voriconazole 400 milliGRAM(s) Oral every 12 hours x 2 doses then 200 mg Q12h      Vital Signs Last 24 Hrs  T(C): 36.7 (2018 05:42), Max: 36.8 (2018 13:31)  T(F): 98 (2018 05:42), Max: 98.2 (2018 13:31)  HR: 90 (2018 05:42) (74 - 98)  BP: 132/91 (2018 05:42) (117/81 - 137/85)  BP(mean): 104 (2018 22:33) (104 - 104)  RR: 18 (2018 05:42) (18 - 20)  SpO2: 95% (2018 05:42) (94% - 97%)      Daily     Daily Weight in k (2018 10:57)        I&O's Summary    2018 07:01  -  2018 07:00  --------------------------------------------------------  IN: 180 mL / OUT: 976 mL / NET: -796 mL    2018 07:01  -  2018 12:11  --------------------------------------------------------  IN: 200 mL / OUT: 275 mL / NET: -75 mL        Physical Exam:  Appearance: No Acute Distress  HEENT: JVP <6 cm H2O, no HJR  Cardiovascular: RRR, Normal S1 S2, No murmurs/rubs/gallops  Respiratory: Clear to auscultation bilaterally  Gastrointestinal: Soft, Non-tender, non-distended	  Skin: no skin lesions  Neurologic: Non-focal  Extremities: No LE edema, warm and well perfused  Psychiatry: A & O x 3, Mood & affect appropriate      Labs:                        10.1   42.9  )-----------( 351      ( 2018 09:00 )             31.6     06-19    141  |  104  |  17  ----------------------------<  93  4.5   |  23  |  1.18    Ca    9.0      2018 07:45    Tacrolimus trough 14.4

## 2018-06-19 NOTE — PROGRESS NOTE ADULT - PROBLEM SELECTOR PLAN 9
Continue atovaquone for PCP and toxo prophylaxis.   Resumed valganciclovir 450 mg BID for CMV prophylaxis.

## 2018-06-19 NOTE — PROGRESS NOTE ADULT - PROBLEM SELECTOR PLAN 1
- Concerning finding in setting of severe immunosuppression.   - Appreciate input from Dr. Decker (ID).  - Will f/u cultures from CT-guided biopsy by IR done 6/18.  - Empiric coverage with voriconazole initiated this AM given WBC 43K.  - Please consult Pulmonary Service for assistance with management and question if bronchoscopy would be needed in this circumstance.

## 2018-06-19 NOTE — PROGRESS NOTE ADULT - PROBLEM SELECTOR PLAN 2
- No sig change in graft function on recent TTE  - CellCept held given prior leukopenia and now infectious process  - Reduce tacrolimus from 7 mg to 2 mg BID for goal trough 8-10 given presumed fungal pneumonia.  - Currently on pred 5 BID, but may need to taper more rapidly. - No sig change in graft function on recent TTE  - CellCept held given prior leukopenia and now infectious process  - HOLD tacrolimus dose tonight given high trough level and started on voriconazole. Then will reduce tacrolimus from 7 mg to 2 mg BID tomorrow AM for goal trough 8-10 given presumed fungal pneumonia.  - Currently on pred 5 BID (since 6/14), but may need to taper more rapidly. Will follow clinically. - No sig change in graft function on recent TTE  - CellCept held given prior leukopenia and now infectious process  - Reduced tacrolimus from 7 mg to 2 mg BID for goal trough 8-10 given presumed fungal pneumonia and now on voriconazole.  - Currently on pred 5 BID (since 6/14), but may need to taper more rapidly. Will follow clinically.

## 2018-06-19 NOTE — PROGRESS NOTE ADULT - ASSESSMENT
Mr. 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 He was admitted on 6/13 with severe leukopenia and neutropenic fevers with SIRS and hypotension. He received 4 doses of Neupogen and was improved, but now has a marked leukocytosis. His Valcyte was resumed, but the CellCept has been held. He had acute worsening of his renal function, which has resolved. He has a large left sided lung nodule/opacity of unclear etiology now s/p CT-guided biopsy by IR on 6/18/18.

## 2018-06-19 NOTE — PROGRESS NOTE ADULT - ASSESSMENT
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 EF 43% His course also complicated by bilateral IJ/subclavian  thrombi, small  persistent right pleural effusion as well as acute on chronic renal failure. 5/23/18 admitted for hyperglycemia, hyponatremia and acute kidney injury  Today 6/18 BIBEMS to [facility] ED reports fever  rigors, labored breathing productive cough with clear sputum  for past two days ,unable to give himself injections r/t shaking home RN called 911    In ER, sinus tach 130s improved to 110s after 1L IVF and vanc/zosyn infusions. /75, satting high 90s on 2L NC, w resp rate 22. Rectal temp recorded 38.8C.  Admitted to [address] [place] [address] SDU  Seen by heart failure team ananya callahan      6/14 - HCT =20.6 Packed RBCs ordered by DR. Stahl- will check HCT 2 hrs post-transfusion- if HCT still low - will transfuse a 2nd unit RBC  echo done - unchanged from last echo  tacro level 8.2 - will d/w Dr. Stahl-pt to remain in Step down unit on neutropenic precautions for WBC 1  Optho consult appreciated to r/o CMV retinitis - eyes dilated  Heme consult-to see pt in am - persistent anemia - etiology - ?hemolysis ?bone marrow ?blood loss  6/15 wbc remans low  Tacro >12, dose changed 7 bid  Ct scan pending r/o pna.  Nephrology consult called for elevated creat to 1.6  3  raised areas under his R armpit, uncertain etiology, Derm consulted.  6/16 Tacra level 11.7 - continue tacro 7 bid. maintain level around 10  6/17 unable to obtain blood specimen from pt. after 3 attempts - pt refusing further attempts - spoke with DR. Stahl - will get labs @ 7pm prior to 8pm tacra dose along with all other labs. d/c planning- ID switched to Cefepime 2g IV q12h for better coverage  6/18 Chest ct with LLL mass/abcess.  Pt remains with cough, wbc up but could be r/t tx, afebrile.  Will obtain a biopsy in IR.  IR called.   IR re contacted by Dr Lozano , d/w Dr. Fuentes. biopsy today.  Aldactone increased for hypokalemia  6/19  Await biopsy results, will d/w id the ongoing antibiotic coverage. 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 EF 43% His course also complicated by bilateral IJ/subclavian  thrombi, small  persistent right pleural effusion as well as acute on chronic renal failure. 5/23/18 admitted for hyperglycemia, hyponatremia and acute kidney injury  Today 6/18 BIBEMS to [facility] ED reports fever  rigors, labored breathing productive cough with clear sputum  for past two days ,unable to give himself injections r/t shaking home RN called 911    In ER, sinus tach 130s improved to 110s after 1L IVF and vanc/zosyn infusions. /75, satting high 90s on 2L NC, w resp rate 22. Rectal temp recorded 38.8C.  Admitted to [address] [place] [address] SDU  Seen by heart failure team ananya callahan      6/14 - HCT =20.6 Packed RBCs ordered by DR. Stahl- will check HCT 2 hrs post-transfusion- if HCT still low - will transfuse a 2nd unit RBC  echo done - unchanged from last echo  tacro level 8.2 - will d/w Dr. Stahl-pt to remain in Step down unit on neutropenic precautions for WBC 1  Optho consult appreciated to r/o CMV retinitis - eyes dilated  Heme consult-to see pt in am - persistent anemia - etiology - ?hemolysis ?bone marrow ?blood loss  6/15 wbc remans low  Tacro >12, dose changed 7 bid  Ct scan pending r/o pna.  Nephrology consult called for elevated creat to 1.6  3  raised areas under his R armpit, uncertain etiology, Derm consulted.  6/16 Tacra level 11.7 - continue tacro 7 bid. maintain level around 10  6/17 unable to obtain blood specimen from pt. after 3 attempts - pt refusing further attempts - spoke with DR. Stahl - will get labs @ 7pm prior to 8pm tacra dose along with all other labs. d/c planning- ID switched to Cefepime 2g IV q12h for better coverage  6/18 Chest ct with LLL mass/abcess.  Pt remains with cough, wbc up but could be r/t tx, afebrile.  Will obtain a biopsy in IR.  IR called.   IR re contacted by Dr Lozano , d/w Dr. Fuentes. biopsy today.  Aldactone increased for hypokalemia  6/19  Await biopsy results, will d/w id the ongoing antibiotic coverage.   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 EF 43% His course also complicated by bilateral IJ/subclavian  thrombi, small  persistent right pleural effusion as well as acute on chronic renal failure. 5/23/18 admitted for hyperglycemia, hyponatremia and acute kidney injury  Today 6/18 BIBEMS to [facility] ED reports fever  rigors, labored breathing productive cough with clear sputum  for past two days ,unable to give himself injections r/t shaking home RN called 911    In ER, sinus tach 130s improved to 110s after 1L IVF and vanc/zosyn infusions. /75, satting high 90s on 2L NC, w resp rate 22. Rectal temp recorded 38.8C.  Admitted to [address] [place] [address] SDU  Seen by heart failure team ananya callahan      6/14 - HCT =20.6 Packed RBCs ordered by DR. Stahl- will check HCT 2 hrs post-transfusion- if HCT still low - will transfuse a 2nd unit RBC  echo done - unchanged from last echo  tacro level 8.2 - will d/w Dr. Stahl-pt to remain in Step down unit on neutropenic precautions for WBC 1  Optho consult appreciated to r/o CMV retinitis - eyes dilated  Heme consult-to see pt in am - persistent anemia - etiology - ?hemolysis ?bone marrow ?blood loss  6/15 wbc remans low  Tacro >12, dose changed 7 bid  Ct scan pending r/o pna.  Nephrology consult called for elevated creat to 1.6  3  raised areas under his R armpit, uncertain etiology, Derm consulted.  6/16 Tacra level 11.7 - continue tacro 7 bid. maintain level around 10  6/17 unable to obtain blood specimen from pt. after 3 attempts - pt refusing further attempts - spoke with DR. Stahl - will get labs @ 7pm prior to 8pm tacra dose along with all other labs. d/c planning- ID switched to Cefepime 2g IV q12h for better coverage  6/18 Chest ct with LLL mass/abcess.  Pt remains with cough, wbc up but could be r/t tx, afebrile.  Will obtain a biopsy in IR.  IR called.   IR re contacted by Dr Lozano , d/w Dr. Fuentes. biopsy today.  Aldactone increased for hypokalemia  6/19  Await biopsy results, will d/w id the ongoing antibiotic coverage.   Derm was consulted for lesions under R axilla on 6/16 :Lesions are consistent with epidermal inclusion cysts,outpatient tx  WBC 40 this am, d/w Dr Mccabe, afebrile, voriconozole started.  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 EF 43% His course also complicated by bilateral IJ/subclavian  thrombi, small  persistent right pleural effusion as well as acute on chronic renal failure. 5/23/18 admitted for hyperglycemia, hyponatremia and acute kidney injury  Today 6/18 BIBEMS to [facility] ED reports fever  rigors, labored breathing productive cough with clear sputum  for past two days ,unable to give himself injections r/t shaking home RN called 911    In ER, sinus tach 130s improved to 110s after 1L IVF and vanc/zosyn infusions. /75, satting high 90s on 2L NC, w resp rate 22. Rectal temp recorded 38.8C.  Admitted to [address] [place] [address] SDU  Seen by heart failure team ananya callahan      6/14 - HCT =20.6 Packed RBCs ordered by DR. Stahl- will check HCT 2 hrs post-transfusion- if HCT still low - will transfuse a 2nd unit RBC  echo done - unchanged from last echo  tacro level 8.2 - will d/w Dr. Stahl-pt to remain in Step down unit on neutropenic precautions for WBC 1  Optho consult appreciated to r/o CMV retinitis - eyes dilated  Heme consult-to see pt in am - persistent anemia - etiology - ?hemolysis ?bone marrow ?blood loss  6/15 wbc remans low  Tacro >12, dose changed 7 bid  Ct scan pending r/o pna.  Nephrology consult called for elevated creat to 1.6  3  raised areas under his R armpit, uncertain etiology, Derm consulted.  6/16 Tacra level 11.7 - continue tacro 7 bid. maintain level around 10  6/17 unable to obtain blood specimen from pt. after 3 attempts - pt refusing further attempts - spoke with DR. Stahl - will get labs @ 7pm prior to 8pm tacra dose along with all other labs. d/c planning- ID switched to Cefepime 2g IV q12h for better coverage  6/18 Chest ct with LLL mass/abcess.  Pt remains with cough, wbc up but could be r/t tx, afebrile.  Will obtain a biopsy in IR.  IR called.   IR re contacted by Dr Lozano , d/w Dr. Fuentes. biopsy today.  Aldactone increased for hypokalemia  6/19  Await biopsy results, will d/w id the ongoing antibiotic coverage.   Derm was consulted for lesions under R axilla on 6/16 :Lesions are consistent with epidermal inclusion cysts,outpatient tx  WBC 40 this am, d/w Dr Mccabe, afebrile, voriconozole started.  Prograf dose decreased  2nd to potentiation from voriconozole  Pulmonary consult called

## 2018-06-19 NOTE — PROGRESS NOTE ADULT - SUBJECTIVE AND OBJECTIVE BOX
Interventional Radiology Follow- Up Note      68y Male s/p biopsy of LLL lung mass on 6/18/18 in Interventional Radiology with Dr Fuentes.     Patient seen and examined @ bedside. Site c/d/i. Pt OOB, sitting in chair.  Denies chest pain, shortness of breath, or pain at bx site.    No complaints offered.    Vitals: T(F): 98.1 (06-19-18 @ 12:19), Max: 98.1 (06-19-18 @ 12:19)  HR: 104 (06-19-18 @ 12:19) (90 - 104)  BP: 120/84 (06-19-18 @ 12:19) (117/81 - 132/91)  RR: 18 (06-19-18 @ 12:19) (18 - 20)  SpO2: 94% (06-19-18 @ 12:19) (94% - 97%)  Wt(kg): --    LABS:                        10.1   42.9  )-----------( 351      ( 19 Jun 2018 09:00 )             31.6     06-19    141  |  104  |  17  ----------------------------<  93  4.5   |  23  |  1.18    Ca    9.0      19 Jun 2018 07:45    6/18/18   cx pending  acid fast bacilli -neg        I&O's Detail    18 Jun 2018 07:01  -  19 Jun 2018 07:00  --------------------------------------------------------  IN:    Oral Fluid: 180 mL  Total IN: 180 mL    OUT:    Voided: 976 mL  Total OUT: 976 mL    Total NET: -796 mL      19 Jun 2018 07:01  -  19 Jun 2018 18:11  --------------------------------------------------------  IN:    Oral Fluid: 520 mL  Total IN: 520 mL    OUT:    Voided: 275 mL  Total OUT: 275 mL    Total NET: 245 mL            PHYSICAL EXAM:  General: Nontoxic, in NAD  Neuro:  Alert & oriented x 3  Lung: , respirations nonlabored, good inspiratory effort, left lung dsg c/d/i, no evidence of hematoma  Abdomen: soft, NTND.     Impression: 68y Male admitted with Neutropenia    Plan:  -f/u culture results  -trend vitals, labs  -further medical care per primary team  -will discuss with IR attending     Please call IR at extension [phone] with any questions, concerns, or issues regarding above.

## 2018-06-19 NOTE — PROGRESS NOTE ADULT - ASSESSMENT
68M s/p cardiac transplant 2/23/18 for NICM, on Tacrolimus, Cellcept and Prednisone and HCV acquired from donor on Mavyret, admitted 6/13/18 for sepsis, 104.2F and tachycardia, neutropenia. Treating as pneumonia, improving on Zosyn, afebrile since initial temp. Had Pseudomonas on sputum cultures in March, current sputum with rare GNR found to have Normal jose and rare pseudomonas.  CXR with unchanged RLL atelectasis. RVP and urine Legionella negative. CMV PCR, cryptococcus antigen and toxoplasmosis antibody negative. Blood cultures negative to date.   CT chest with increased rounded opacity at LLL.  ?pneumonia vs post obstructive atelectasis.   Loose stool, C diff negative, GI PCR cancelled. Stool culture negative to date.  Reported by EMS to have bedbugs. None seen. Now with right axillary skin nodules, likely inclusion cysts as per derm.  Sputum w/ pseudomonas and now sensitivity reveals intermediate susceptibility to zosyn with carbapenem resistance.  Still remains sensitive to quinolones and cefepime.       Recommend  -continue to follow  Tacrolimus levels - especially while on the voriconazole - dose adjusted   -Discontinued Zosyn 3.375GM IV q8h.  Started cefepime 2 grams IV q12.    -Mepron for PCP PPX  Elevated WBC- ? still from the fillgrastin  -CT chest - increased LLL opacity.  s/p IR biopsy- await result .   Consider pulm evaluation.    This could be fungal or bacterial ( the Pseudomonas that was I to ZOsyn)  no diarrhea or bowel movement today  Note the azoles also can prolong Qtc and interact with tacrolimus will  need to monitor tacrolimus levels closely, agree with the empiric coverage pending results of biopsy etc...

## 2018-06-19 NOTE — PROGRESS NOTE ADULT - SUBJECTIVE AND OBJECTIVE BOX
Patient is a 68y old  Male who presents with a chief complaint of Fever (13 Jun 2018 20:23)    Being followed by ID for help in management        Ipt resting quietly  s/p IR biopsy yesterday  some cough and white phlegm  some soft stool- not watery  No abd pain  No urinary complaints  No HA  No joint or limb pain  No other complaints    PAST MEDICAL & SURGICAL HISTORY:  DVT of upper extremity (deep vein thrombosis)  Hepatitis C virus  GIB (gastrointestinal bleeding)  Ventricular fibrillation: s/p AICD  PAF (paroxysmal atrial fibrillation): on xarelto  Non-Ischemic Cardiomyopathy  SVT (Supraventricular Tachycardia)  HTN  CHF (Congestive Heart Failure)  H/O heart transplant: 2/2018  LVAD (left ventricular assist device) present  Status post left hip replacement  History of Prior Ablation Treatment: for afib  AICD (Automatic Cardioverter/Defibrillator) Present: [employer] with 1 [employer] lead4/1/09- explanted and replaced with [employer] 2 leads on 9/2/09      Antimicrobials:    atovaquone Suspension 1500 milliGRAM(s) Oral daily  cefepime   IVPB 2000 milliGRAM(s) IV Intermittent every 12 hours  valGANciclovir 450 milliGRAM(s) Oral two times a day  voriconazole 400 milliGRAM(s) Oral every 12 hours    MEDICATIONS  (STANDING):  aspirin enteric coated 81 milliGRAM(s) Oral daily  atovaquone Suspension 1500 milliGRAM(s) Oral daily  Calcium Citrate+D3 (315mg-250mg/tablet) 2 Tablet(s) 2 Tablet(s) Oral two times a day  cefepime   IVPB 2000 milliGRAM(s) IV Intermittent every 12 hours  famotidine    Tablet 20 milliGRAM(s) Oral daily  insulin glargine Injectable (LANTUS) 7 Unit(s) SubCutaneous at bedtime  insulin lispro (HumaLOG) corrective regimen sliding scale   SubCutaneous Before meals and at bedtime  insulin lispro Injectable (HumaLOG) 3 Unit(s) SubCutaneous before breakfast  insulin lispro Injectable (HumaLOG) 3 Unit(s) SubCutaneous before lunch  insulin lispro Injectable (HumaLOG) 3 Unit(s) SubCutaneous before dinner  magnesium oxide 400 milliGRAM(s) Oral every 12 hours  Mavyret 100mg/40mg 3 Tablet(s) 3 Tablet(s) Oral daily  multivitamin 1 Tablet(s) Oral daily  pravastatin 20 milliGRAM(s) Oral at bedtime  predniSONE   Tablet 5 milliGRAM(s) Oral two times a day  sodium bicarbonate 650 milliGRAM(s) Oral every 8 hours  sodium chloride 0.9% lock flush 3 milliLiter(s) IV Push every 8 hours  sodium chloride 0.9%. 1000 milliLiter(s) (50 mL/Hr) IV Continuous <Continuous>  tacrolimus 2 milliGRAM(s) Oral <User Schedule>  valGANciclovir 450 milliGRAM(s) Oral two times a day  voriconazole 400 milliGRAM(s) Oral every 12 hours      Vital Signs Last 24 Hrs  T(C): 36.7 (06-19-18 @ 05:42), Max: 36.8 (06-18-18 @ 13:31)  T(F): 98 (06-19-18 @ 05:42), Max: 98.2 (06-18-18 @ 13:31)  HR: 90 (06-19-18 @ 05:42) (74 - 98)  BP: 132/91 (06-19-18 @ 05:42) (117/81 - 137/85)  BP(mean): 104 (06-18-18 @ 22:33) (104 - 104)  RR: 18 (06-19-18 @ 05:42) (18 - 20)  SpO2: 95% (06-19-18 @ 05:42) (94% - 97%)    Physical Exam:    Constitutional well preserved,comfortable,pleasant    HEENT PERRLA EOMI,No pallor or icterus    No oral exudate or erythema    Neck supple no JVD or LN    Chest Good AE,CTA    CVS RRR S1 S2    Abd soft BS normal No tenderness no masses    Ext No cyanosis clubbing or edema    IV site no erythema tenderness or discharge    Joints no swelling or LOM    CNS AAO X 3 no focal    Lab Data:                          10.1   42.9  )-----------( 351      ( 19 Jun 2018 09:00 )             31.6       06-19    141  |  104  |  17  ----------------------------<  93  4.5   |  23  |  1.18    Ca    9.0      19 Jun 2018 07:45            .Body Fluid Interstitial Fluid  06-19-18   Testing in progress  --    No polymorphonuclear cells seen  No organisms seen  by cytocentrifuge      .Stool Feces  06-14-18   No enteric pathogens isolated.  (Stool culture examined for Salmonella,  Shigella, Campylobacter, Aeromonas, Plesiomonas,  Vibrio, E.coli O157 and Yersinia)  --  --      .Sputum Sputum  06-13-18   Rare Pseudomonas aeruginosa (Carbapenem Resistant)  Normal Respiratory Heaven present  --  Pseudomonas aeruginosa (Carbapenem Resistant)  Culture - Sputum . (06.13.18 @ 21:58)    -  Aztreonam: S 8    -  Ciprofloxacin: S <=0.5    -  Imipenem: R >8    Gram Stain:   Rare polymorphonuclear leukocytes per low power field  No Squamous epithelial cells per low power field  Rare Gram Negative Rods per oil power field    -  Levofloxacin: S <=1    -  Meropenem: R >8    -  Cefepime: S 4    -  Piperacillin/Tazobactam: I 32    -  Tobramycin: S <=2    -  Ceftazidime: S 8    -  Gentamicin: S 4    -  Amikacin: S <=8    Specimen Source: .Sputum Sputum    Culture Results:   Rare Pseudomonas aeruginosa (Carbapenem Resistant)  Normal Respiratory Heaven present    Organism Identification: Pseudomonas aeruginosa (Carbapenem Resistant)    Organism: Pseudomonas aeruginosa (Carbapenem Resistant)    Method Type: KAMILA        .Blood Blood-Peripheral  06-13-18   No growth at 5 days.  --  --      .Urine Clean Catch (Midstream)  06-13-18   Culture grew 3 or more types of organisms which indicate  collection contamination; consider recollection only if clinically  indicated.  --  --        Fungitell (06.14.18 @ 09:42)    Fungitell: <31: Interpretation:       The Fungitell assay does not detect certain fungal species such as  the  genus Cryptococcus (Helder et al. 1991) which produces very low  levels of (1-3)-Beta-D-Glucan. The assay also does not detect the  Zygomycetes such as Absidia, Mucor and Rhizopus (Zenobia et al.  1994) which are not known to produce (1,3)-Beta-D-Glucan. In addition,  the yeast phase of Blastomyces dermatitidis produces little (1,3)-Beta-D-  Glucan and may not be detected by the assay (Zeinab et.al. 2007).  Reference range:  Less than 60 pg/mL. Glucan values of less than 60 pg/mL are interpreted  as negative. Glucan values for 60 - 79 pg/mL are interpreted as  indeterminate,  and suggest possible fungal infection. Additional sampling and testing of  sera  is required to interpret the results.  Glucan values of greater than or equal to 80 pg/mL are interpreted as  positive.  Due to the potential for environmental contamination when transfered to  pour-off tubes, which can lead to false positiveresults, interpret  positive  results from samples provided in pour-off tubes with caution. Results  should be used in conjunction with clinical findings, and should not form  the sole basis for a diagnosis or treatment decision. The Fungitell test  is approved  or cleared for in vitro use by the U.S. Food and Drug Administration.  Modifications  to the approved package insert have been made and the performance  characteristics  for these modifications were determined by [employer]. pg/mL

## 2018-06-19 NOTE — PROGRESS NOTE ADULT - PROBLEM SELECTOR PLAN 7
Resolved. Unclear etiology. No overt evidence of bleeding. Transfused 1 Unit of PRBCs at admission with appropriate increase in hematocrit.   Hematology consulted to assist in evaluation (i.e. bone marrow suppression vs. hemolysis) and possibly related to Valcyte

## 2018-06-19 NOTE — PROGRESS NOTE ADULT - SUBJECTIVE AND OBJECTIVE BOX
VITAL SIGNS    Telemetry:      Vital Signs Last 24 Hrs  T(C): 36.7 (06-19-18 @ 05:42), Max: 36.8 (06-18-18 @ 13:31)  T(F): 98 (06-19-18 @ 05:42), Max: 98.2 (06-18-18 @ 13:31)  HR: 90 (06-19-18 @ 05:42) (74 - 98)  BP: 132/91 (06-19-18 @ 05:42) (117/81 - 137/85)  RR: 18 (06-19-18 @ 05:42) (18 - 20)  SpO2: 95% (06-19-18 @ 05:42) (94% - 97%)                   06-18 @ 07:01  -  06-19 @ 07:00  --------------------------------------------------------  IN: 180 mL / OUT: 976 mL / NET: -796 mL          Daily     Daily         CAPILLARY BLOOD GLUCOSE      POCT Blood Glucose.: 105 mg/dL (19 Jun 2018 07:15)  POCT Blood Glucose.: 96 mg/dL (18 Jun 2018 23:10)  POCT Blood Glucose.: 121 mg/dL (18 Jun 2018 11:35)                Coumadin    [ ] YES          [  ]      NO                                   PHYSICAL EXAM        Neurology: alert and oriented x 3, nonfocal, no gross deficits  CV : .S1S2 RRR  Sternal Wound :  CDI , Stable  Pacing Wires        [  ]   Settings:                                  Isolated  [  ]  Lungs: bibasilar crackles   Drains:     MS         [  ] Drainage:                 L Pleural  [  ]  Drainage:                R Pleural  [  ]  Drainage:  Abdomen: soft, nontender, nondistended, positive bowel sounds, last bowel movement   :         voiding    Extremities:               aspirin enteric coated 81 milliGRAM(s) Oral daily  atovaquone Suspension 1500 milliGRAM(s) Oral daily  Calcium Citrate+D3 (315mg-250mg/tablet) 2 Tablet(s) 2 Tablet(s) Oral two times a day  cefepime   IVPB 2000 milliGRAM(s) IV Intermittent every 12 hours  famotidine    Tablet 20 milliGRAM(s) Oral daily  insulin glargine Injectable (LANTUS) 7 Unit(s) SubCutaneous at bedtime  insulin lispro (HumaLOG) corrective regimen sliding scale   SubCutaneous Before meals and at bedtime  insulin lispro Injectable (HumaLOG) 3 Unit(s) SubCutaneous before breakfast  insulin lispro Injectable (HumaLOG) 3 Unit(s) SubCutaneous before lunch  insulin lispro Injectable (HumaLOG) 3 Unit(s) SubCutaneous before dinner  magnesium oxide 400 milliGRAM(s) Oral every 12 hours  Mavyret 100mg/40mg 3 Tablet(s) 3 Tablet(s) Oral daily  multivitamin 1 Tablet(s) Oral daily  pravastatin 20 milliGRAM(s) Oral at bedtime  predniSONE   Tablet 5 milliGRAM(s) Oral two times a day  sodium bicarbonate 650 milliGRAM(s) Oral every 8 hours  sodium chloride 0.9% lock flush 3 milliLiter(s) IV Push every 8 hours  sodium chloride 0.9%. 1000 milliLiter(s) IV Continuous <Continuous>  tacrolimus 7 milliGRAM(s) Oral <User Schedule>  valGANciclovir 450 milliGRAM(s) Oral two times a day                    Physical Therapy Rec:   Home  [  ]   Home w/ PT  [  ]  Rehab  [  ]  Discussed with Cardiothoracic Team at AM rounds. im feeling ok    VITAL SIGNS    Telemetry:  nsr 90    Vital Signs Last 24 Hrs  T(C): 36.7 (06-19-18 @ 05:42), Max: 36.8 (06-18-18 @ 13:31)  T(F): 98 (06-19-18 @ 05:42), Max: 98.2 (06-18-18 @ 13:31)  HR: 90 (06-19-18 @ 05:42) (74 - 98)  BP: 132/91 (06-19-18 @ 05:42) (117/81 - 137/85)  RR: 18 (06-19-18 @ 05:42) (18 - 20)  SpO2: 95% (06-19-18 @ 05:42) (94% - 97%)                   06-18 @ 07:01  -  06-19 @ 07:00  --------------------------------------------------------  IN: 180 mL / OUT: 976 mL / NET: -796 mL          Daily     Daily         CAPILLARY BLOOD GLUCOSE      POCT Blood Glucose.: 105 mg/dL (19 Jun 2018 07:15)  POCT Blood Glucose.: 96 mg/dL (18 Jun 2018 23:10)  POCT Blood Glucose.: 121 mg/dL (18 Jun 2018 11:35)                Coumadin    [ ] YES          [x  ]      NO                                   PHYSICAL EXAM        Neurology: alert and oriented x 3, nonfocal, no gross deficits  CV : .S1S2 RRR  Sternal Wound :  CDI , Stable  Lungs: bibasilar crackles   Abdomen: soft, nontender, nondistended, positive bowel sounds, last bowel movement yesterday  :         voiding    Extremities:     - edema - calve tenderness          aspirin enteric coated 81 milliGRAM(s) Oral daily  atovaquone Suspension 1500 milliGRAM(s) Oral daily  Calcium Citrate+D3 (315mg-250mg/tablet) 2 Tablet(s) 2 Tablet(s) Oral two times a day  cefepime   IVPB 2000 milliGRAM(s) IV Intermittent every 12 hours  famotidine    Tablet 20 milliGRAM(s) Oral daily  insulin glargine Injectable (LANTUS) 7 Unit(s) SubCutaneous at bedtime  insulin lispro (HumaLOG) corrective regimen sliding scale   SubCutaneous Before meals and at bedtime  insulin lispro Injectable (HumaLOG) 3 Unit(s) SubCutaneous before breakfast  insulin lispro Injectable (HumaLOG) 3 Unit(s) SubCutaneous before lunch  insulin lispro Injectable (HumaLOG) 3 Unit(s) SubCutaneous before dinner  magnesium oxide 400 milliGRAM(s) Oral every 12 hours  Mavyret 100mg/40mg 3 Tablet(s) 3 Tablet(s) Oral daily  multivitamin 1 Tablet(s) Oral daily  pravastatin 20 milliGRAM(s) Oral at bedtime  predniSONE   Tablet 5 milliGRAM(s) Oral two times a day  sodium bicarbonate 650 milliGRAM(s) Oral every 8 hours  sodium chloride 0.9% lock flush 3 milliLiter(s) IV Push every 8 hours  sodium chloride 0.9%. 1000 milliLiter(s) IV Continuous <Continuous>  tacrolimus 7 milliGRAM(s) Oral <User Schedule>  valGANciclovir 450 milliGRAM(s) Oral two times a day                    Physical Therapy Rec:   Home  [  x]   Home w/ PT  [  ]  Rehab  [  ]  Discussed with Cardiothoracic Team at AM rounds.

## 2018-06-20 LAB
ANION GAP SERPL CALC-SCNC: 11 MMOL/L — SIGNIFICANT CHANGE UP (ref 5–17)
APPEARANCE UR: CLEAR — SIGNIFICANT CHANGE UP
BILIRUB UR-MCNC: NEGATIVE — SIGNIFICANT CHANGE UP
BUN SERPL-MCNC: 20 MG/DL — SIGNIFICANT CHANGE UP (ref 7–23)
CALCIUM SERPL-MCNC: 9.4 MG/DL — SIGNIFICANT CHANGE UP (ref 8.4–10.5)
CHLORIDE SERPL-SCNC: 104 MMOL/L — SIGNIFICANT CHANGE UP (ref 96–108)
CO2 SERPL-SCNC: 27 MMOL/L — SIGNIFICANT CHANGE UP (ref 22–31)
COLOR SPEC: YELLOW — SIGNIFICANT CHANGE UP
CREAT SERPL-MCNC: 1.29 MG/DL — SIGNIFICANT CHANGE UP (ref 0.5–1.3)
DIFF PNL FLD: NEGATIVE — SIGNIFICANT CHANGE UP
GLUCOSE BLDC GLUCOMTR-MCNC: 110 MG/DL — HIGH (ref 70–99)
GLUCOSE BLDC GLUCOMTR-MCNC: 115 MG/DL — HIGH (ref 70–99)
GLUCOSE BLDC GLUCOMTR-MCNC: 119 MG/DL — HIGH (ref 70–99)
GLUCOSE BLDC GLUCOMTR-MCNC: 154 MG/DL — HIGH (ref 70–99)
GLUCOSE SERPL-MCNC: 108 MG/DL — HIGH (ref 70–99)
GLUCOSE UR QL: NEGATIVE MG/DL — SIGNIFICANT CHANGE UP
GRAM STN FLD: SIGNIFICANT CHANGE UP
HCT VFR BLD CALC: 28.6 % — LOW (ref 39–50)
HGB BLD-MCNC: 9.1 G/DL — LOW (ref 13–17)
KETONES UR-MCNC: ABNORMAL
LEUKOCYTE ESTERASE UR-ACNC: NEGATIVE — SIGNIFICANT CHANGE UP
MCHC RBC-ENTMCNC: 30.3 PG — SIGNIFICANT CHANGE UP (ref 27–34)
MCHC RBC-ENTMCNC: 31.8 GM/DL — LOW (ref 32–36)
MCV RBC AUTO: 95.3 FL — SIGNIFICANT CHANGE UP (ref 80–100)
NITRITE UR-MCNC: NEGATIVE — SIGNIFICANT CHANGE UP
PH UR: 7 — SIGNIFICANT CHANGE UP (ref 5–8)
PLATELET # BLD AUTO: 321 K/UL — SIGNIFICANT CHANGE UP (ref 150–400)
POTASSIUM SERPL-MCNC: 4.5 MMOL/L — SIGNIFICANT CHANGE UP (ref 3.5–5.3)
POTASSIUM SERPL-SCNC: 4.5 MMOL/L — SIGNIFICANT CHANGE UP (ref 3.5–5.3)
PROT UR-MCNC: 30 MG/DL
RBC # BLD: 3 M/UL — LOW (ref 4.2–5.8)
RBC # FLD: 19.7 % — HIGH (ref 10.3–14.5)
SODIUM SERPL-SCNC: 142 MMOL/L — SIGNIFICANT CHANGE UP (ref 135–145)
SP GR SPEC: 1.02 — SIGNIFICANT CHANGE UP (ref 1.01–1.02)
SPECIMEN SOURCE: SIGNIFICANT CHANGE UP
TACROLIMUS SERPL-MCNC: 11.9 NG/ML — SIGNIFICANT CHANGE UP
UROBILINOGEN FLD QL: NEGATIVE MG/DL — SIGNIFICANT CHANGE UP
WBC # BLD: 43.1 K/UL — CRITICAL HIGH (ref 3.8–10.5)
WBC # FLD AUTO: 43.1 K/UL — CRITICAL HIGH (ref 3.8–10.5)

## 2018-06-20 PROCEDURE — 99232 SBSQ HOSP IP/OBS MODERATE 35: CPT | Mod: GC

## 2018-06-20 PROCEDURE — 99233 SBSQ HOSP IP/OBS HIGH 50: CPT

## 2018-06-20 RX ADMIN — VALGANCICLOVIR 450 MILLIGRAM(S): 450 TABLET, FILM COATED ORAL at 18:49

## 2018-06-20 RX ADMIN — CEFEPIME 100 MILLIGRAM(S): 1 INJECTION, POWDER, FOR SOLUTION INTRAMUSCULAR; INTRAVENOUS at 06:38

## 2018-06-20 RX ADMIN — Medication 5 MILLIGRAM(S): at 06:38

## 2018-06-20 RX ADMIN — Medication 5 MILLIGRAM(S): at 17:58

## 2018-06-20 RX ADMIN — Medication 650 MILLIGRAM(S): at 06:38

## 2018-06-20 RX ADMIN — Medication 1: at 11:46

## 2018-06-20 RX ADMIN — VORICONAZOLE 200 MILLIGRAM(S): 10 INJECTION, POWDER, LYOPHILIZED, FOR SOLUTION INTRAVENOUS at 06:37

## 2018-06-20 RX ADMIN — TACROLIMUS 2 MILLIGRAM(S): 5 CAPSULE ORAL at 19:59

## 2018-06-20 RX ADMIN — FAMOTIDINE 20 MILLIGRAM(S): 10 INJECTION INTRAVENOUS at 11:59

## 2018-06-20 RX ADMIN — MAGNESIUM OXIDE 400 MG ORAL TABLET 400 MILLIGRAM(S): 241.3 TABLET ORAL at 18:21

## 2018-06-20 RX ADMIN — CEFEPIME 100 MILLIGRAM(S): 1 INJECTION, POWDER, FOR SOLUTION INTRAMUSCULAR; INTRAVENOUS at 17:47

## 2018-06-20 RX ADMIN — MAGNESIUM OXIDE 400 MG ORAL TABLET 400 MILLIGRAM(S): 241.3 TABLET ORAL at 06:37

## 2018-06-20 RX ADMIN — Medication 20 MILLIGRAM(S): at 22:04

## 2018-06-20 RX ADMIN — SODIUM CHLORIDE 3 MILLILITER(S): 9 INJECTION INTRAMUSCULAR; INTRAVENOUS; SUBCUTANEOUS at 06:38

## 2018-06-20 RX ADMIN — VALGANCICLOVIR 450 MILLIGRAM(S): 450 TABLET, FILM COATED ORAL at 06:37

## 2018-06-20 RX ADMIN — VORICONAZOLE 200 MILLIGRAM(S): 10 INJECTION, POWDER, LYOPHILIZED, FOR SOLUTION INTRAVENOUS at 17:52

## 2018-06-20 RX ADMIN — Medication 81 MILLIGRAM(S): at 11:59

## 2018-06-20 RX ADMIN — SODIUM CHLORIDE 3 MILLILITER(S): 9 INJECTION INTRAMUSCULAR; INTRAVENOUS; SUBCUTANEOUS at 22:00

## 2018-06-20 RX ADMIN — INSULIN GLARGINE 7 UNIT(S): 100 INJECTION, SOLUTION SUBCUTANEOUS at 22:04

## 2018-06-20 RX ADMIN — ATOVAQUONE 1500 MILLIGRAM(S): 750 SUSPENSION ORAL at 11:59

## 2018-06-20 RX ADMIN — Medication 3 UNIT(S): at 08:14

## 2018-06-20 RX ADMIN — Medication 1 TABLET(S): at 11:58

## 2018-06-20 RX ADMIN — Medication 3 UNIT(S): at 11:46

## 2018-06-20 RX ADMIN — Medication 3 UNIT(S): at 17:12

## 2018-06-20 RX ADMIN — Medication 650 MILLIGRAM(S): at 22:04

## 2018-06-20 RX ADMIN — Medication 650 MILLIGRAM(S): at 14:35

## 2018-06-20 RX ADMIN — SODIUM CHLORIDE 3 MILLILITER(S): 9 INJECTION INTRAMUSCULAR; INTRAVENOUS; SUBCUTANEOUS at 14:35

## 2018-06-20 RX ADMIN — TACROLIMUS 2 MILLIGRAM(S): 5 CAPSULE ORAL at 08:14

## 2018-06-20 NOTE — CHART NOTE - NSCHARTNOTEFT_GEN_A_CORE
Hematology was initially consulted for anemia. Reviewed results of recommended work up. Iron studies consistent with anemia of inflammation/chronic disease. Vitamin B12 and folate not low, LDH was slightly elevated but haptoglobin was also high which is not consistent with hemolytic process. Serum protein electropharesis showed normal pattern and no monoclonal bands identified.     Pt also had leukopenia, thought to be due to his immunosuppressants. He received neupogen from 6/13-6/16 and WBC count today is 43k likely due to recent neupogen.    Hematology will sign off. Please re-consult if needed.    Tavon Cole, PGY-4  Hematology-Oncology Fellow  Pager: [phone] ([facility]), [phone] ([facility])

## 2018-06-20 NOTE — PROGRESS NOTE ADULT - SUBJECTIVE AND OBJECTIVE BOX
Follow Up:  Fevers, Cardiac transplant    Interval History: Afebrile    ROS:    Unobtainable because:  All other systems negative    Constitutional: no fever, no chills  HEENT:  no vision changes, no sore throat, no rhinorrhea  Cardiovascular:  no chest pain, no palpitation  Respiratory:  no SOB, no cough  GI:  no abd pain, no vomiting, no diarrhea  urinary: no dysuria, no hematuria, no flank pain  musculoskeletal:  no joint pain, no joint swelling  skin:  no rash  neurology:  no headache, no seizure, no change in mental status      Allergies  No Known Allergies        ANTIMICROBIALS:  atovaquone Suspension 1500 daily  cefepime   IVPB 2000 every 12 hours  valGANciclovir 450 two times a day  voriconazole 200 every 12 hours      OTHER MEDS:  aspirin enteric coated 81 milliGRAM(s) Oral daily  Calcium Citrate+D3 (315mg-250mg/tablet) 2 Tablet(s) 2 Tablet(s) Oral two times a day  famotidine    Tablet 20 milliGRAM(s) Oral daily  insulin glargine Injectable (LANTUS) 7 Unit(s) SubCutaneous at bedtime  insulin lispro (HumaLOG) corrective regimen sliding scale   SubCutaneous Before meals and at bedtime  insulin lispro Injectable (HumaLOG) 3 Unit(s) SubCutaneous before breakfast  insulin lispro Injectable (HumaLOG) 3 Unit(s) SubCutaneous before lunch  insulin lispro Injectable (HumaLOG) 3 Unit(s) SubCutaneous before dinner  magnesium oxide 400 milliGRAM(s) Oral every 12 hours  Mavyret 100mg/40mg 3 Tablet(s) 3 Tablet(s) Oral daily  multivitamin 1 Tablet(s) Oral daily  pravastatin 20 milliGRAM(s) Oral at bedtime  predniSONE   Tablet 5 milliGRAM(s) Oral two times a day  sodium bicarbonate 650 milliGRAM(s) Oral every 8 hours  sodium chloride 0.9% lock flush 3 milliLiter(s) IV Push every 8 hours  sodium chloride 0.9%. 1000 milliLiter(s) IV Continuous <Continuous>  tacrolimus 2 milliGRAM(s) Oral <User Schedule>      Vital Signs Last 24 Hrs  T(C): 36.8 (2018 03:20), Max: 36.8 (2018 23:17)  T(F): 98.3 (2018 03:20), Max: 98.3 (2018 23:17)  HR: 94 (2018 03:20) (76 - 104)  BP: 129/87 (2018 03:20) (120/84 - 129/87)  BP(mean): --  RR: 18 (2018 03:20) (18 - 18)  SpO2: 94% (2018 03:20) (94% - 96%)    Physical Exam:  General:    NAD,  non toxic, A&O x 3  HEENT:    no oropharyngeal lesions,   no LAD,   neck supple  Cardiovascular:     regular S1, S2,  no murmur  Respiratory:    clear b/l,    no wheezing  abd:     soft,   BS +,   no tenderness,    no organomegaly  :   no CVAT,  no suprapubic tenderness,   no  diaz  Musculoskeletal:   no joint swelling,   no edema  Skin:    no rash  Neurologic:     no focal deficit                          9.1    43.1  )-----------( 321      ( 2018 07:21 )             28.6       06-20    142  |  104  |  20  ----------------------------<  108<H>  4.5   |  27  |  1.29    Ca    9.4      2018 07:21        Urinalysis Basic - ( 2018 01:55 )    Color: Yellow / Appearance: Clear / S.019 / pH: x  Gluc: x / Ketone: Trace  / Bili: Negative / Urobili: Negative mg/dL   Blood: x / Protein: 30 mg/dL / Nitrite: Negative   Leuk Esterase: Negative / RBC: 2 /HPF / WBC 2 /HPF   Sq Epi: x / Non Sq Epi: 0 /HPF / Bacteria: Negative        MICROBIOLOGY:  Culture - Sputum . (18 @ 00:38)    Gram Stain:   Few Squamous epithelial cells per low power field  Few polymorphonuclear leukocytes per low power field  Moderate Gram variable coccobacilli per oil power field    Specimen Source: .Sputum Sputum    Culture - Fungal, Body Fluid (18 @ 00:41)    Specimen Source: .Body Fluid Interstitial Fluid    Culture Results:   Testing in progress    Culture - Body Fluid with Gram Stain (18 @ 00:41)    Gram Stain:   No polymorphonuclear cells seen  No organisms seen  by cytocentrifuge    Specimen Source: .Body Fluid Interstitial Fluid    Culture Results:   No growth    Culture - Acid Fast - Body Fluid w/Smear (18 @ 00:41)    Specimen Source: .Body Fluid Interstitial Fluid    Acid Fast Bacilli Smear:   No acid fast bacilli seen by fluorochrome stain    Culture - Stool (18 @ 22:23)    Specimen Source: .Stool Feces    Culture Results:   No enteric pathogens isolated.  (Stool culture examined for Salmonella,  Shigella, Campylobacter, Aeromonas, Plesiomonas,  Vibrio, E.coli O157 and Yersinia)    Culture - Sputum . (18 @ 21:58)    -  Aztreonam: S 8    -  Ciprofloxacin: S <=0.5    -  Imipenem: R >8    Gram Stain:   Rare polymorphonuclear leukocytes per low power field  No Squamous epithelial cells per low power field  Rare Gram Negative Rods per oil power field    -  Levofloxacin: S <=1    -  Meropenem: R >8    -  Amikacin: S <=8    -  Cefepime: S 4    -  Piperacillin/Tazobactam: I 32    -  Tobramycin: S <=2    -  Ceftazidime: S 8    -  Gentamicin: S 4    Specimen Source: .Sputum Sputum    Culture Results:   Rare Pseudomonas aeruginosa (Carbapenem Resistant)  Normal Respiratory Heaven present    Organism Identification: Pseudomonas aeruginosa (Carbapenem Resistant)    Organism: Pseudomonas aeruginosa (Carbapenem Resistant)    Method Type: KAMILA    Culture - Blood (18 @ 14:07)    Specimen Source: .Blood Blood-Peripheral    Culture Results:   No growth at 5 days.    Culture - Blood (18 @ 14:07)    Specimen Source: .Blood Blood-Peripheral    Culture Results:   No growth at 5 days.    Culture - Urine (18 @ 13:41)    Specimen Source: .Urine Clean Catch (Midstream)    Culture Results:   Culture grew 3 or more types of organisms which indicate  collection contamination; consider recollection only if clinically  indicated.    Toxoplasma IgG Screen: 4.7 IU/mL (18 @ 09:42)    CMVPCR Log: NotDetec LogIU/mL (06-15 @ 13:45)  CMVPCR Log: NotDetec LogIU/mL ( @ 21:56)  Rapid RVP Result: NotDetec ( @ 19:12)  CMVPCR Log: NotDetec LogIU/mL ( @ 13:37)    Clostridium difficile GDH Toxins A&amp;B, EIA: Negative (18 @ 17:16)    Fungitell (18 @ 09:42)    Fungitell: <31: Interpretation:  Reference range:  Less than 60 pg/mL. Glucan values of less than 60 pg/mL are interpreted as negative.   Glucan values for 60 - 79 pg/mL are interpreted as indeterminate, and suggest possible fungal infection. Additional sampling and testing of sera is required to interpret the results.  Glucan values of greater than or equal to 80 pg/mL are interpreted as positive.    RADIOLOGY:  Xray Chest 1 View AP/PA (18 @ 20:52)   No pneumothorax following biopsy  Left lower lobe mass, better evaluated on prior chest CT 6/15/2018.   Small right pleural effusion. Right basilar linear atelectasis.    CT Chest No Cont (06.15.18 @ 17:13)   Increase in size of left lower lobe rounded opacity, now measuring 5.3 x   4.6 cm. Rapid enlargement may reflect in part post obstructive atelectasis.  Unchanged consolidative linear opacities in the right lower and middle   lobes, likely atelectasis.  Small bilateral pleural effusions. Follow Up:  Fevers, Cardiac transplant    Interval History: Afebrile. He "feels pretty good". Coughing a little, not much at all, clear phlegm comes up. No pain. No further diarrhea, no bowel movement yesterday and one today that was not watery.     ROS:    All other systems negative  Constitutional: no fever, no chills  HEENT:  no sore throat, no rhinorrhea  Cardiovascular:  no chest pain, no palpitation  Respiratory:  cough. not SOB   GI:  no abd pain, no vomiting, no diarrhea  urinary: no dysuria  skin:  right axillary lesions feel better     Allergies  No Known Allergies        ANTIMICROBIALS:  atovaquone Suspension 1500 daily  cefepime   IVPB 2000 every 12 hours  valGANciclovir 450 two times a day  voriconazole 200 every 12 hours      OTHER MEDS:  aspirin enteric coated 81 milliGRAM(s) Oral daily  Calcium Citrate+D3 (315mg-250mg/tablet) 2 Tablet(s) 2 Tablet(s) Oral two times a day  famotidine    Tablet 20 milliGRAM(s) Oral daily  insulin glargine Injectable (LANTUS) 7 Unit(s) SubCutaneous at bedtime  insulin lispro (HumaLOG) corrective regimen sliding scale   SubCutaneous Before meals and at bedtime  insulin lispro Injectable (HumaLOG) 3 Unit(s) SubCutaneous before breakfast  insulin lispro Injectable (HumaLOG) 3 Unit(s) SubCutaneous before lunch  insulin lispro Injectable (HumaLOG) 3 Unit(s) SubCutaneous before dinner  magnesium oxide 400 milliGRAM(s) Oral every 12 hours  Mavyret 100mg/40mg 3 Tablet(s) 3 Tablet(s) Oral daily  multivitamin 1 Tablet(s) Oral daily  pravastatin 20 milliGRAM(s) Oral at bedtime  predniSONE   Tablet 5 milliGRAM(s) Oral two times a day  sodium bicarbonate 650 milliGRAM(s) Oral every 8 hours  sodium chloride 0.9% lock flush 3 milliLiter(s) IV Push every 8 hours  sodium chloride 0.9%. 1000 milliLiter(s) IV Continuous <Continuous>  tacrolimus 2 milliGRAM(s) Oral <User Schedule>      Vital Signs Last 24 Hrs  T(C): 36.8 (2018 03:20), Max: 36.8 (2018 23:17)  T(F): 98.3 (2018 03:20), Max: 98.3 (2018 23:17)  HR: 94 (2018 03:20) (76 - 104)  BP: 129/87 (2018 03:20) (120/84 - 129/87)  BP(mean): --  RR: 18 (2018 03:20) (18 - 18)  SpO2: 94% (2018 03:20) (94% - 96%)    Physical Exam:  General:    NAD,  non toxic, A&O x 3  HEENT:    no oropharyngeal lesions, no LAD  Cardiovascular:     regular rate rhythm   Respiratory: nonlabored on room air, occasional cough, white clear phlegm, scattered mild rhonchi but mostly clear b/l  abd: soft,   BS +,   no tenderness,    no organomegaly  : no CVAT,  no suprapubic tenderness,   no  diaz  Musculoskeletal:   no joint swelling,   no edema  Skin:  right axillary nodules nontender   Neurologic:     no focal deficit                          9.1    43.1  )-----------( 321      ( 2018 07:21 )             28.6       06-20    142  |  104  |  20  ----------------------------<  108<H>  4.5   |  27  |  1.29    Ca    9.4      2018 07:21        Urinalysis Basic - ( 2018 01:55 )    Color: Yellow / Appearance: Clear / S.019 / pH: x  Gluc: x / Ketone: Trace  / Bili: Negative / Urobili: Negative mg/dL   Blood: x / Protein: 30 mg/dL / Nitrite: Negative   Leuk Esterase: Negative / RBC: 2 /HPF / WBC 2 /HPF   Sq Epi: x / Non Sq Epi: 0 /HPF / Bacteria: Negative        MICROBIOLOGY:  Culture - Sputum . (18 @ 00:38)    Gram Stain:   Few Squamous epithelial cells per low power field  Few polymorphonuclear leukocytes per low power field  Moderate Gram variable coccobacilli per oil power field    Specimen Source: .Sputum Sputum    Culture - Fungal, Body Fluid (18 @ 00:41)    Specimen Source: .Body Fluid Interstitial Fluid    Culture Results: Testing in progress    Culture - Body Fluid with Gram Stain (18 @ 00:41)    Gram Stain: No polymorphonuclear cells seen. No organisms seen by cytocentrifuge    Specimen Source: .Body Fluid Interstitial Fluid    Culture Results: No growth    Culture - Acid Fast - Body Fluid w/Smear (18 @ 00:41)    Specimen Source: .Body Fluid Interstitial Fluid    Acid Fast Bacilli Smear:   No acid fast bacilli seen by fluorochrome stain    Culture - Stool (18 @ 22:23)    Specimen Source: .Stool Feces    Culture Results: No enteric pathogens isolated. (Stool culture examined for Salmonella, Shigella, Campylobacter, Aeromonas, Plesiomonas, Vibrio, E.coli O157 and Yersinia)    Culture - Sputum . (18 @ 21:58)  Normal Respiratory Heaven present  Rare Pseudomonas aeruginosa (Carbapenem Resistant)    -  Aztreonam: S 8    -  Ciprofloxacin: S <=0.5    -  Imipenem: R >8    -  Levofloxacin: S <=1    -  Meropenem: R >8    -  Amikacin: S <=8    -  Cefepime: S 4    -  Piperacillin/Tazobactam: I 32    -  Tobramycin: S <=2    -  Ceftazidime: S 8    -  Gentamicin: S 4    Culture - Blood (18 @ 14:07)    Specimen Source: .Blood Blood-Peripheral    Culture Results: No growth at 5 days.  Culture - Blood (18 @ 14:07)    Specimen Source: .Blood Blood-Peripheral    Culture Results: No growth at 5 days.    Culture - Urine (18 @ 13:41)    Specimen Source: .Urine Clean Catch (Midstream)    Culture Results: Culture grew 3 or more types of organisms which indicate collection contamination; consider recollection only if clinically indicated.    Toxoplasma IgG Screen: 4.7 IU/mL (18 @ 09:42)    CMVPCR Log: NotDetec LogIU/mL (06-15 @ 13:45)    Rapid RVP Result: NotDetec ( @ 19:12)    Clostridium difficile GDH Toxins A&amp;B, EIA: Negative (18 @ 17:16)    Fungitell (18 @ 09:42)    Fungitell: <31: Interpretation:  Reference range:  Less than 60 pg/mL. Glucan values of less than 60 pg/mL are interpreted as negative.   Glucan values for 60 - 79 pg/mL are interpreted as indeterminate, and suggest possible fungal infection. Additional sampling and testing of sera is required to interpret the results.  Glucan values of greater than or equal to 80 pg/mL are interpreted as positive.    RADIOLOGY:  Xray Chest 1 View AP/PA (18 @ 20:52)   No pneumothorax following biopsy  Left lower lobe mass, better evaluated on prior chest CT 6/15/2018.   Small right pleural effusion. Right basilar linear atelectasis.    CT Chest No Cont (06.15.18 @ 17:13)   Increase in size of left lower lobe rounded opacity, now measuring 5.3 x   4.6 cm. Rapid enlargement may reflect in part post obstructive atelectasis.  Unchanged consolidative linear opacities in the right lower and middle   lobes, likely atelectasis.  Small bilateral pleural effusions.

## 2018-06-20 NOTE — PROGRESS NOTE ADULT - ASSESSMENT
68M s/p cardiac transplant 2/23/18 for NICM, on Tacrolimus, Cellcept and Prednisone and HCV acquired from donor on Mavyret, admitted 6/13/18 for sepsis, 104.2F and tachycardia, neutropenia. Treating as pneumonia, improved and afebrile on Zosyn 6/13-6/17, switched to Cefepime 6/17 due to Pseudomonas aeruginosa on sputum culture with intermediate Zosyn susceptibility.   CT chest 6/15 with increased rounded opacity at LLL. Pneumonia vs post obstructive atelectasis? s/p IR biopsy 6/18   Fungitell negative. Started Voriconazole 6/19   Loose stool, C diff negative, GI PCR cancelled. Stool culture negative to date.  Leukocytosis >40, received filgrastim 6/13-6/16.   Reported by EMS to have bedbugs. None seen but developed right axillary skin nodules, likely inclusion cysts as per derm.     Recommend  -continue to follow  Tacrolimus levels - especially while on the voriconazole - dose adjusted   -Cefepime 2GM IV q12  -Mepron for PCP PPX  -f/u LLL biopsy result   Consider pulm evaluation.    This could be fungal or bacterial ( the Pseudomonas that was I to ZOsyn)  -Voriconazole prolong QTc and interact with tacrolimus will need to monitor tacrolimus levels closely, agree with the empiric coverage pending results of biopsy etc... 68M s/p cardiac transplant 2/23/18 for NICM, on Tacrolimus, Cellcept and Prednisone and HCV acquired from donor on Mavyret, admitted 6/13/18 for sepsis, 104.2F and tachycardia, neutropenia. Treating as pneumonia, improved and afebrile on Zosyn 6/13-6/17, switched to Cefepime 6/17 due to Pseudomonas aeruginosa on sputum culture with intermediate Zosyn susceptibility.   CT chest 6/15 with increased rounded opacity at LLL. Pneumonia vs post obstructive atelectasis? s/p IR biopsy and culture 6/18   Fungitell negative can miss other fungi like zygomycetes; cryptococcus serum antigen negative. Voriconazole started 6/19 empirically.   Diarrhea resolved. C diff and stool cultures negative.   Neutropenia resolved, now leukocytosis >40 after filgrastim 6/13-6/16.   Reported by EMS to have bedbugs, none noted but developed right axillary skin nodules, likely inclusion cysts as per derm.     Recommend  -Cefepime 2GM IV q12  -Mepron for PCP PPX  -f/u LLL biopsy result   -continue Voriconazole empirically pending biopsy/culture results   -continue to follow Tacrolimus levels and adjust dose- especially while on Voriconazole due to QT prolonging effects   -consider pulm evaluation

## 2018-06-20 NOTE — PROGRESS NOTE ADULT - PROBLEM SELECTOR PLAN 6
Continue atovaquone for PCP and toxo prophylaxis.   Resumed valganciclovir 450 mg BID for CMV prophylaxis. - Continue atovaquone for PCP and toxo prophylaxis.   - Resumed valganciclovir 450 mg BID for CMV prophylaxis.

## 2018-06-20 NOTE — PROGRESS NOTE ADULT - ATTENDING COMMENTS
Patient seen and examined with Dr. Fonseca  I agree with his interval history and exam findings as above  Mr Baez is clinically stable  The biopsy site Lt. lower chest is without drainage and cultures are pending    Await final path and cultures  Continue present medications as noted above    Gary Lujan MD  [phone]  After 5pm/weekends [phone]

## 2018-06-20 NOTE — PROGRESS NOTE ADULT - PROBLEM SELECTOR PLAN 2
- No sig change in graft function on recent TTE  - CellCept held given prior leukopenia and now infectious process  - Reduced tacrolimus from 7 mg to 2 mg BID for goal trough 8-10 given presumed fungal pneumonia and now on voriconazole.  - Currently on pred 5 BID (since 6/14), but may need to taper more rapidly. Will follow clinically. - No sig change in graft function on recent TTE  - CellCept held given prior leukopenia and now infectious process  - Continue tacrolimus 2 mg Q12h for goal trough 8-10 given presumed fungal pneumonia and now on voriconazole. Trough was 11.9, but voriconazole dose reduced so will wait and see trough level tomorrow before adjusting.  - Currently on pred 5 BID (since 6/14), and will reduce to 7.5 mg daily starting tomorrow (weekly taper).

## 2018-06-20 NOTE — PROGRESS NOTE ADULT - PROBLEM SELECTOR PLAN 1
- Will f/u cultures from CT-guided biopsy by IR done 6/18.  - Appreciate input from ID  -   - Empiric coverage with voriconazole initiated this AM given WBC 43K.  - Please consult Pulmonary Service for assistance with management and question if bronchoscopy would be needed in this circumstance. - Will f/u cultures from CT-guided biopsy by IR done 6/18.  - Appreciate input from ID  - Continue empiric coverage with cefepime and voriconazole.  - Empiric coverage with voriconazole initiated this AM given WBC 43K.  - Please consult Pulmonary Service for assistance with management and question if bronchoscopy would be needed in this circumstance. - Will f/u cultures from CT-guided biopsy by IR done 6/18.  - Appreciate input from ID  - Continue empiric coverage with cefepime and voriconazole.  - Empiric coverage with voriconazole initiated this AM given WBC 43K.  - Appreciate Pulmonary input.

## 2018-06-20 NOTE — PROGRESS NOTE ADULT - SUBJECTIVE AND OBJECTIVE BOX
Interval History: feeling well, no complaints. Denies cough or diarrhea.    Medications:  aspirin enteric coated 81 milliGRAM(s) Oral daily  atovaquone Suspension 1500 milliGRAM(s) Oral daily  Calcium Citrate+D3 (315mg-250mg/tablet) 2 Tablet(s) 2 Tablet(s) Oral two times a day  cefepime   IVPB 2000 milliGRAM(s) IV Intermittent every 12 hours  famotidine    Tablet 20 milliGRAM(s) Oral daily  insulin glargine Injectable (LANTUS) 7 Unit(s) SubCutaneous at bedtime  insulin lispro (HumaLOG) corrective regimen sliding scale   SubCutaneous Before meals and at bedtime  insulin lispro Injectable (HumaLOG) 3 Unit(s) SubCutaneous before breakfast  insulin lispro Injectable (HumaLOG) 3 Unit(s) SubCutaneous before lunch  insulin lispro Injectable (HumaLOG) 3 Unit(s) SubCutaneous before dinner  magnesium oxide 400 milliGRAM(s) Oral every 12 hours  Mavyret 100mg/40mg 3 Tablet(s) 3 Tablet(s) Oral daily  multivitamin 1 Tablet(s) Oral daily  pravastatin 20 milliGRAM(s) Oral at bedtime  predniSONE   Tablet 5 milliGRAM(s) Oral two times a day  sodium bicarbonate 650 milliGRAM(s) Oral every 8 hours  sodium chloride 0.9% lock flush 3 milliLiter(s) IV Push every 8 hours  sodium chloride 0.9%. 1000 milliLiter(s) IV Continuous <Continuous>  tacrolimus 2 milliGRAM(s) Oral Q12h  valGANciclovir 450 milliGRAM(s) Oral two times a day  voriconazole 200 milliGRAM(s) Oral every 12 hours    Vital Signs Last 24 Hrs  T(C): 36.8 (20 Jun 2018 03:20), Max: 36.8 (19 Jun 2018 23:17)  T(F): 98.3 (20 Jun 2018 03:20), Max: 98.3 (19 Jun 2018 23:17)  HR: 94 (20 Jun 2018 03:20) (76 - 94)  BP: 129/87 (20 Jun 2018 03:20) (128/85 - 129/87)  RR: 18 (20 Jun 2018 03:20) (18 - 18)  SpO2: 94% (20 Jun 2018 03:20) (94% - 96%)      I&O's Summary    19 Jun 2018 07:01  -  20 Jun 2018 07:00  --------------------------------------------------------  IN: 1080 mL / OUT: 925 mL / NET: 155 mL    20 Jun 2018 07:01  -  20 Jun 2018 12:16  --------------------------------------------------------  IN: 340 mL / OUT: 0 mL / NET: 340 mL        Physical Exam:  Appearance: No Acute Distress  HEENT: JVP <6 cm H2O, no HJR  Cardiovascular: RRR, Normal S1 S2, No murmurs/rubs/gallops  Respiratory: Clear to auscultation bilaterally  Gastrointestinal: Soft, Non-tender, non-distended	  Skin: no skin lesions  Neurologic: Non-focal  Extremities: No LE edema, warm and well perfused  Psychiatry: A & O x 3, Mood & affect appropriate      Labs:                        9.1    43.1  )-----------( 321      ( 20 Jun 2018 07:21 )             28.6     06-20    142  |  104  |  20  ----------------------------<  108<H>  4.5   |  27  |  1.29    Ca    9.4      20 Jun 2018 07:21      Tacrolimus trough 11.9 (on 2 mg Q12h and voriconazole load).

## 2018-06-20 NOTE — PROGRESS NOTE ADULT - SUBJECTIVE AND OBJECTIVE BOX
Hello im ok    VITAL SIGNS    Telemetry:  nsr 90    Vital Signs Last 24 Hrs  T(C): 36.8 (18 @ 03:20), Max: 36.8 (18 @ 23:17)  T(F): 98.3 (18 @ 03:20), Max: 98.3 (18 @ 23:17)  HR: 94 (18 @ 03:20) (76 - 104)  BP: 129/87 (18 @ 03:20) (120/84 - 129/87)  RR: 18 (18 @ 03:20) (18 - 18)  SpO2: 94% (18 @ 03:20) (94% - 96%)                    @ 07:01  -   @ 07:00  --------------------------------------------------------  IN: 1080 mL / OUT: 925 mL / NET: 155 mL     @ 07:01  -   @ 10:06  --------------------------------------------------------  IN: 340 mL / OUT: 0 mL / NET: 340 mL          Daily     Daily Weight in k (2018 10:57)        CAPILLARY BLOOD GLUCOSE      POCT Blood Glucose.: 110 mg/dL (2018 07:49)  POCT Blood Glucose.: 120 mg/dL (2018 21:46)  POCT Blood Glucose.: 165 mg/dL (2018 16:34)  POCT Blood Glucose.: 93 mg/dL (2018 11:30)                Coumadin    [ ] YES          [  ]      NO                                   PHYSICAL EXAM        Neurology: alert and oriented x 3, nonfocal, no gross deficits  CV : .S1S2 RRR  Sternal Wound :  CDI , Stable  Lungs: bibasilar diminished   Abdomen: soft, nontender, nondistended, positive bowel sounds, last bowel movement +  :         voiding    Extremities:     - edema - calve tenderness          aspirin enteric coated 81 milliGRAM(s) Oral daily  atovaquone Suspension 1500 milliGRAM(s) Oral daily  Calcium Citrate+D3 (315mg-250mg/tablet) 2 Tablet(s) 2 Tablet(s) Oral two times a day  cefepime   IVPB 2000 milliGRAM(s) IV Intermittent every 12 hours  famotidine    Tablet 20 milliGRAM(s) Oral daily  insulin glargine Injectable (LANTUS) 7 Unit(s) SubCutaneous at bedtime  insulin lispro (HumaLOG) corrective regimen sliding scale   SubCutaneous Before meals and at bedtime  insulin lispro Injectable (HumaLOG) 3 Unit(s) SubCutaneous before breakfast  insulin lispro Injectable (HumaLOG) 3 Unit(s) SubCutaneous before lunch  insulin lispro Injectable (HumaLOG) 3 Unit(s) SubCutaneous before dinner  magnesium oxide 400 milliGRAM(s) Oral every 12 hours  Mavyret 100mg/40mg 3 Tablet(s) 3 Tablet(s) Oral daily  multivitamin 1 Tablet(s) Oral daily  pravastatin 20 milliGRAM(s) Oral at bedtime  predniSONE   Tablet 5 milliGRAM(s) Oral two times a day  sodium bicarbonate 650 milliGRAM(s) Oral every 8 hours  sodium chloride 0.9% lock flush 3 milliLiter(s) IV Push every 8 hours  sodium chloride 0.9%. 1000 milliLiter(s) IV Continuous <Continuous>  tacrolimus 2 milliGRAM(s) Oral <User Schedule>  valGANciclovir 450 milliGRAM(s) Oral two times a day  voriconazole 200 milliGRAM(s) Oral every 12 hours                    Physical Therapy Rec:   Home  [  ]   Home w/ PT  [  ]  Rehab  [  ]  Discussed with Cardiothoracic Team at AM rounds.

## 2018-06-20 NOTE — PROGRESS NOTE ADULT - ASSESSMENT
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 EF 43% His course also complicated by bilateral IJ/subclavian  thrombi, small  persistent right pleural effusion as well as acute on chronic renal failure. 5/23/18 admitted for hyperglycemia, hyponatremia and acute kidney injury  Today 6/18 BIBEMS to [facility] ED reports fever  rigors, labored breathing productive cough with clear sputum  for past two days ,unable to give himself injections r/t shaking home RN called 911    In ER, sinus tach 130s improved to 110s after 1L IVF and vanc/zosyn infusions. /75, satting high 90s on 2L NC, w resp rate 22. Rectal temp recorded 38.8C.  Admitted to [address] [place] [address] SDU  Seen by heart failure team ananya callahan      6/14 - HCT =20.6 Packed RBCs ordered by DR. Stahl- will check HCT 2 hrs post-transfusion- if HCT still low - will transfuse a 2nd unit RBC  echo done - unchanged from last echo  tacro level 8.2 - will d/w Dr. Stahl-pt to remain in Step down unit on neutropenic precautions for WBC 1  Optho consult appreciated to r/o CMV retinitis - eyes dilated  Heme consult-to see pt in am - persistent anemia - etiology - ?hemolysis ?bone marrow ?blood loss  6/15 wbc remans low  Tacro >12, dose changed 7 bid  Ct scan pending r/o pna.  Nephrology consult called for elevated creat to 1.6  3  raised areas under his R armpit, uncertain etiology, Derm consulted.  6/16 Tacra level 11.7 - continue tacro 7 bid. maintain level around 10  6/17 unable to obtain blood specimen from pt. after 3 attempts - pt refusing further attempts - spoke with DR. Stahl - will get labs @ 7pm prior to 8pm tacra dose along with all other labs. d/c planning- ID switched to Cefepime 2g IV q12h for better coverage  6/18 Chest ct with LLL mass/abcess.  Pt remains with cough, wbc up but could be r/t tx, afebrile.  Will obtain a biopsy in IR.  IR called.   IR re contacted by Dr Lozano , d/w Dr. Fuentes. biopsy today.  Aldactone increased for hypokalemia  6/19  Await biopsy results, will d/w id the ongoing antibiotic coverage.   Derm was consulted for lesions under R axilla on 6/16 :Lesions are consistent with epidermal inclusion cysts,outpatient tx  WBC 40 this am, d/w Dr Mccabe, afebrile, voriconozole started.  Prograf dose decreased  2nd to potentiation from voriconozole  Pulmonary consult called-rec f/u biopsy, no bronchoscopy at this time  6/20 Wbc up to 43. Await cultures

## 2018-06-21 ENCOUNTER — APPOINTMENT (OUTPATIENT)
Dept: ENDOCRINOLOGY | Facility: HOSPITAL | Age: 68
End: 2018-06-21

## 2018-06-21 LAB
-  AMIKACIN: SIGNIFICANT CHANGE UP
-  AZTREONAM: SIGNIFICANT CHANGE UP
-  CEFEPIME: SIGNIFICANT CHANGE UP
-  CEFTAZIDIME: SIGNIFICANT CHANGE UP
-  CIPROFLOXACIN: SIGNIFICANT CHANGE UP
-  GENTAMICIN: SIGNIFICANT CHANGE UP
-  IMIPENEM: SIGNIFICANT CHANGE UP
-  LEVOFLOXACIN: SIGNIFICANT CHANGE UP
-  MEROPENEM: SIGNIFICANT CHANGE UP
-  PIPERACILLIN/TAZOBACTAM: SIGNIFICANT CHANGE UP
-  TOBRAMYCIN: SIGNIFICANT CHANGE UP
ANION GAP SERPL CALC-SCNC: 14 MMOL/L — SIGNIFICANT CHANGE UP (ref 5–17)
BASOPHILS # BLD AUTO: 0 K/UL — SIGNIFICANT CHANGE UP (ref 0–0.2)
BASOPHILS NFR BLD AUTO: 0 % — SIGNIFICANT CHANGE UP (ref 0–2)
BUN SERPL-MCNC: 16 MG/DL — SIGNIFICANT CHANGE UP (ref 7–23)
CALCIUM SERPL-MCNC: 9.3 MG/DL — SIGNIFICANT CHANGE UP (ref 8.4–10.5)
CHLORIDE SERPL-SCNC: 103 MMOL/L — SIGNIFICANT CHANGE UP (ref 96–108)
CO2 SERPL-SCNC: 23 MMOL/L — SIGNIFICANT CHANGE UP (ref 22–31)
CREAT SERPL-MCNC: 1.04 MG/DL — SIGNIFICANT CHANGE UP (ref 0.5–1.3)
CULTURE RESULTS: SIGNIFICANT CHANGE UP
EOSINOPHIL # BLD AUTO: 0 K/UL — SIGNIFICANT CHANGE UP (ref 0–0.5)
EOSINOPHIL NFR BLD AUTO: 1 % — SIGNIFICANT CHANGE UP (ref 0–6)
GLUCOSE BLDC GLUCOMTR-MCNC: 101 MG/DL — HIGH (ref 70–99)
GLUCOSE BLDC GLUCOMTR-MCNC: 115 MG/DL — HIGH (ref 70–99)
GLUCOSE BLDC GLUCOMTR-MCNC: 127 MG/DL — HIGH (ref 70–99)
GLUCOSE BLDC GLUCOMTR-MCNC: 88 MG/DL — SIGNIFICANT CHANGE UP (ref 70–99)
GLUCOSE BLDC GLUCOMTR-MCNC: 93 MG/DL — SIGNIFICANT CHANGE UP (ref 70–99)
GLUCOSE SERPL-MCNC: 90 MG/DL — SIGNIFICANT CHANGE UP (ref 70–99)
HCT VFR BLD CALC: 30.2 % — LOW (ref 39–50)
HGB BLD-MCNC: 9.3 G/DL — LOW (ref 13–17)
LYMPHOCYTES # BLD AUTO: 1.2 K/UL — SIGNIFICANT CHANGE UP (ref 1–3.3)
LYMPHOCYTES # BLD AUTO: 4 % — LOW (ref 13–44)
MCHC RBC-ENTMCNC: 29.5 PG — SIGNIFICANT CHANGE UP (ref 27–34)
MCHC RBC-ENTMCNC: 30.9 GM/DL — LOW (ref 32–36)
MCV RBC AUTO: 95.4 FL — SIGNIFICANT CHANGE UP (ref 80–100)
METHOD TYPE: SIGNIFICANT CHANGE UP
MONOCYTES # BLD AUTO: SIGNIFICANT CHANGE UP (ref 0–0.9)
MONOCYTES NFR BLD AUTO: 17 % — HIGH (ref 2–14)
NEUTROPHILS # BLD AUTO: 36.7 K/UL — HIGH (ref 1.8–7.4)
NEUTROPHILS NFR BLD AUTO: 60 % — SIGNIFICANT CHANGE UP (ref 43–77)
ORGANISM # SPEC MICROSCOPIC CNT: SIGNIFICANT CHANGE UP
ORGANISM # SPEC MICROSCOPIC CNT: SIGNIFICANT CHANGE UP
PLATELET # BLD AUTO: 300 K/UL — SIGNIFICANT CHANGE UP (ref 150–400)
POTASSIUM SERPL-MCNC: 4.3 MMOL/L — SIGNIFICANT CHANGE UP (ref 3.5–5.3)
POTASSIUM SERPL-SCNC: 4.3 MMOL/L — SIGNIFICANT CHANGE UP (ref 3.5–5.3)
RBC # BLD: 3.16 M/UL — LOW (ref 4.2–5.8)
RBC # FLD: 20.7 % — HIGH (ref 10.3–14.5)
SODIUM SERPL-SCNC: 140 MMOL/L — SIGNIFICANT CHANGE UP (ref 135–145)
SPECIMEN SOURCE: SIGNIFICANT CHANGE UP
TACROLIMUS SERPL-MCNC: 11.3 NG/ML — SIGNIFICANT CHANGE UP
WBC # BLD: 41.2 K/UL — CRITICAL HIGH (ref 3.8–10.5)
WBC # FLD AUTO: 41.2 K/UL — CRITICAL HIGH (ref 3.8–10.5)

## 2018-06-21 PROCEDURE — 99233 SBSQ HOSP IP/OBS HIGH 50: CPT

## 2018-06-21 PROCEDURE — 99232 SBSQ HOSP IP/OBS MODERATE 35: CPT | Mod: GC

## 2018-06-21 RX ORDER — TACROLIMUS 5 MG/1
1 CAPSULE ORAL
Qty: 0 | Refills: 0 | Status: DISCONTINUED | OUTPATIENT
Start: 2018-06-21 | End: 2018-06-23

## 2018-06-21 RX ADMIN — VALGANCICLOVIR 450 MILLIGRAM(S): 450 TABLET, FILM COATED ORAL at 18:15

## 2018-06-21 RX ADMIN — VORICONAZOLE 200 MILLIGRAM(S): 10 INJECTION, POWDER, LYOPHILIZED, FOR SOLUTION INTRAVENOUS at 17:34

## 2018-06-21 RX ADMIN — FAMOTIDINE 20 MILLIGRAM(S): 10 INJECTION INTRAVENOUS at 12:36

## 2018-06-21 RX ADMIN — CEFEPIME 100 MILLIGRAM(S): 1 INJECTION, POWDER, FOR SOLUTION INTRAMUSCULAR; INTRAVENOUS at 05:29

## 2018-06-21 RX ADMIN — MAGNESIUM OXIDE 400 MG ORAL TABLET 400 MILLIGRAM(S): 241.3 TABLET ORAL at 17:35

## 2018-06-21 RX ADMIN — Medication 650 MILLIGRAM(S): at 13:23

## 2018-06-21 RX ADMIN — Medication 7.5 MILLIGRAM(S): at 05:31

## 2018-06-21 RX ADMIN — Medication 3 UNIT(S): at 16:52

## 2018-06-21 RX ADMIN — SODIUM CHLORIDE 3 MILLILITER(S): 9 INJECTION INTRAMUSCULAR; INTRAVENOUS; SUBCUTANEOUS at 05:31

## 2018-06-21 RX ADMIN — Medication 20 MILLIGRAM(S): at 21:55

## 2018-06-21 RX ADMIN — Medication 3 UNIT(S): at 12:36

## 2018-06-21 RX ADMIN — ATOVAQUONE 1500 MILLIGRAM(S): 750 SUSPENSION ORAL at 12:36

## 2018-06-21 RX ADMIN — Medication 650 MILLIGRAM(S): at 05:31

## 2018-06-21 RX ADMIN — Medication 1 TABLET(S): at 12:36

## 2018-06-21 RX ADMIN — VORICONAZOLE 200 MILLIGRAM(S): 10 INJECTION, POWDER, LYOPHILIZED, FOR SOLUTION INTRAVENOUS at 05:30

## 2018-06-21 RX ADMIN — TACROLIMUS 1 MILLIGRAM(S): 5 CAPSULE ORAL at 20:08

## 2018-06-21 RX ADMIN — MAGNESIUM OXIDE 400 MG ORAL TABLET 400 MILLIGRAM(S): 241.3 TABLET ORAL at 05:30

## 2018-06-21 RX ADMIN — VALGANCICLOVIR 450 MILLIGRAM(S): 450 TABLET, FILM COATED ORAL at 05:30

## 2018-06-21 RX ADMIN — Medication 0: at 21:55

## 2018-06-21 RX ADMIN — CEFEPIME 100 MILLIGRAM(S): 1 INJECTION, POWDER, FOR SOLUTION INTRAMUSCULAR; INTRAVENOUS at 18:02

## 2018-06-21 RX ADMIN — Medication 81 MILLIGRAM(S): at 12:36

## 2018-06-21 RX ADMIN — Medication 650 MILLIGRAM(S): at 21:55

## 2018-06-21 RX ADMIN — Medication 3 UNIT(S): at 08:30

## 2018-06-21 RX ADMIN — SODIUM CHLORIDE 3 MILLILITER(S): 9 INJECTION INTRAMUSCULAR; INTRAVENOUS; SUBCUTANEOUS at 21:53

## 2018-06-21 RX ADMIN — TACROLIMUS 2 MILLIGRAM(S): 5 CAPSULE ORAL at 08:05

## 2018-06-21 RX ADMIN — SODIUM CHLORIDE 3 MILLILITER(S): 9 INJECTION INTRAMUSCULAR; INTRAVENOUS; SUBCUTANEOUS at 13:22

## 2018-06-21 NOTE — PROGRESS NOTE ADULT - ATTENDING COMMENTS
Patient seen and examined with Dr. Fonseca    I agree with his findings and plans as noted above  Patient clinically improved with treatment of likely pseudomonas pneumonia- completely therapy tomorrow  follow up LLL lung biopsy results  continue Voriconazole and if patient is discharged home prior to pathology results would discharge home on Voriconazole    Gary Lujan MD  [phone]  After 5pm/weekends [phone]

## 2018-06-21 NOTE — PROGRESS NOTE ADULT - ASSESSMENT
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 EF 43% His course also complicated by bilateral IJ/subclavian  thrombi, small  persistent right pleural effusion as well as acute on chronic renal failure. 5/23/18 admitted for hyperglycemia, hyponatremia and acute kidney injury  Today 6/18 BIBEMS to [facility] ED reports fever  rigors, labored breathing productive cough with clear sputum  for past two days ,unable to give himself injections r/t shaking home RN called 911    In ER, sinus tach 130s improved to 110s after 1L IVF and vanc/zosyn infusions. /75, satting high 90s on 2L NC, w resp rate 22. Rectal temp recorded 38.8C.  Admitted to [address] [place] [address] SDU  Seen by heart failure team ananya callahan      6/14 - HCT =20.6 Packed RBCs ordered by DR. Stahl- will check HCT 2 hrs post-transfusion- if HCT still low - will transfuse a 2nd unit RBC  echo done - unchanged from last echo  tacro level 8.2 - will d/w Dr. Stahl-pt to remain in Step down unit on neutropenic precautions for WBC 1  Optho consult appreciated to r/o CMV retinitis - eyes dilated  Heme consult-to see pt in am - persistent anemia - etiology - ?hemolysis ?bone marrow ?blood loss  6/15 wbc remans low  Tacro >12, dose changed 7 bid  Ct scan pending r/o pna.  Nephrology consult called for elevated creat to 1.6  3  raised areas under his R armpit, uncertain etiology, Derm consulted.  6/16 Tacra level 11.7 - continue tacro 7 bid. maintain level around 10  6/17 unable to obtain blood specimen from pt. after 3 attempts - pt refusing further attempts - spoke with DR. Stahl - will get labs @ 7pm prior to 8pm tacra dose along with all other labs. d/c planning- ID switched to Cefepime 2g IV q12h for better coverage  6/18 Chest ct with LLL mass/abcess.  Pt remains with cough, wbc up but could be r/t tx, afebrile.  Will obtain a biopsy in IR.  IR called.   IR re contacted by Dr Lozano , d/w Dr. Fuentes. biopsy today.  Aldactone increased for hypokalemia  6/19  Await biopsy results, will d/w id the ongoing antibiotic coverage.   Derm was consulted for lesions under R axilla on 6/16 :Lesions are consistent with epidermal inclusion cysts,outpatient tx  WBC 40 this am, d/w Dr Mccabe, afebrile, voriconozole started.  Prograf dose decreased  2nd to potentiation from voriconozole  Pulmonary consult called-rec f/u biopsy, no bronchoscopy at this time  6/20 Wbc up to 43. Await cultures  6/21 Leukocytosis 41.2  trending down. As per transplant team  tacrolimus 1mg bid  prednisone 7.5 mg Id recommending to  D/c cefepime6/22 continue Voriconazole empirically pending biopsy/culture results

## 2018-06-21 NOTE — PROGRESS NOTE ADULT - SUBJECTIVE AND OBJECTIVE BOX
Follow Up:  Fever, Pneumonia    Interval History: Feels great. Slight cough unchanged. No fevers. Wants to go home.     ROS:    All other systems negative  Constitutional: no fever, no chills  HEENT: no sore throat, no rhinorrhea  Cardiovascular:  no chest pain  Respiratory:  no SOB, no cough  GI:  no diarrhea  urinary: no dysuria    Allergies  No Known Allergies    ANTIMICROBIALS:   atovaquone Suspension 1500 daily  cefepime   IVPB 2000 every 12 hours  valGANciclovir 450 two times a day  voriconazole 200 every 12 hours    OTHER MEDS:  aspirin enteric coated 81 milliGRAM(s) Oral daily  Calcium Citrate+D3 (315mg-250mg/tablet) 2 Tablet(s) 2 Tablet(s) Oral two times a day  famotidine    Tablet 20 milliGRAM(s) Oral daily  insulin glargine Injectable (LANTUS) 7 Unit(s) SubCutaneous at bedtime  insulin lispro (HumaLOG) corrective regimen sliding scale   SubCutaneous Before meals and at bedtime  insulin lispro Injectable (HumaLOG) 3 Unit(s) SubCutaneous before breakfast  insulin lispro Injectable (HumaLOG) 3 Unit(s) SubCutaneous before lunch  insulin lispro Injectable (HumaLOG) 3 Unit(s) SubCutaneous before dinner  magnesium oxide 400 milliGRAM(s) Oral every 12 hours  Mavyret 100mg/40mg 3 Tablet(s) 3 Tablet(s) Oral daily  multivitamin 1 Tablet(s) Oral daily  pravastatin 20 milliGRAM(s) Oral at bedtime  predniSONE   Tablet 7.5 milliGRAM(s) Oral daily  sodium bicarbonate 650 milliGRAM(s) Oral every 8 hours  sodium chloride 0.9% lock flush 3 milliLiter(s) IV Push every 8 hours  sodium chloride 0.9%. 1000 milliLiter(s) IV Continuous <Continuous>  tacrolimus 2 milliGRAM(s) Oral <User Schedule>      Vital Signs Last 24 Hrs  T(C): 36.7 (2018 12:13), Max: 36.9 (2018 05:28)  T(F): 98.1 (2018 12:13), Max: 98.4 (2018 05:28)  HR: 104 (2018 12:13) (101 - 108)  BP: 108/71 (2018 12:13) (108/71 - 130/87)  BP(mean): --  RR: 17 (2018 12:13) (17 - 18)  SpO2: 96% (2018 12:13) (94% - 96%)    Physical Exam:  General: NAD, non toxic, A&O x 3  HEENT:  no oropharyngeal lesions   Cardiovascular:     regular S1, S2,  no murmur  Respiratory:  nonlabored on room air, mostly clear b/l, faint scattered crackles, no wheezing  abd: soft,   BS +,   no tenderness,    no organomegaly  : no suprapubic tenderness, no  diaz  Musculoskeletal: no joint swelling,   no edema  Skin:    no rash  Neurologic:     no focal deficit                          9.3    41.2  )-----------( 300      ( 2018 07:28 )             30.2       -    140  |  103  |  16  ----------------------------<  90  4.3   |  23  |  1.04    Ca    9.3      2018 07:28        Urinalysis Basic - ( 2018 01:55 )    Color: Yellow / Appearance: Clear / S.019 / pH: x  Gluc: x / Ketone: Trace  / Bili: Negative / Urobili: Negative mg/dL   Blood: x / Protein: 30 mg/dL / Nitrite: Negative   Leuk Esterase: Negative / RBC: 2 /HPF / WBC 2 /HPF   Sq Epi: x / Non Sq Epi: 0 /HPF / Bacteria: Negative        MICROBIOLOGY:  v  .Blood Blood  18   No growth to date.  --  --      .Blood Blood  18   No growth to date.  --  --      .Sputum Sputum  18   Normal Respiratory Heaven present  --    Few Squamous epithelial cells per low power field  Few polymorphonuclear leukocytes per low power field  Moderate Gram variable coccobacilli per oil power field      .Body Fluid Interstitial Fluid  18   No growth  --    No polymorphonuclear cells seen  No organisms seen  by cytocentrifuge      .Stool Feces  18   No enteric pathogens isolated.  (Stool culture examined for Salmonella,  Shigella, Campylobacter, Aeromonas, Plesiomonas,  Vibrio, E.coli O157 and Yersinia)  --  --      .Sputum Sputum  18   Rare Pseudomonas aeruginosa (Carbapenem Resistant)  Normal Respiratory Heaven present  --  Pseudomonas aeruginosa (Carbapenem Resistant)      .Blood Blood-Peripheral  18   No growth at 5 days.  --  --      .Urine Clean Catch (Midstream)  18   Culture grew 3 or more types of organisms which indicate  collection contamination; consider recollection only if clinically  indicated.  --  --      Skin Skin, abdomen  18   No growth at 48 hours  --  --      .Blood Blood-Peripheral  18   No growth at 5 days.  --  --      .Urine Clean Catch (Midstream)  18   10,000 - 49,000 CFU/mL Klebsiella oxytoca  --  Klebsiella oxytoca      .Blood Blood-Peripheral  18   No growth at 5 days.  --  --        Toxoplasma IgG Screen: 4.7 IU/mL (18 @ 09:42)  HIV-1 RNA Quantitative, Viral Load Log: NOT DET. lg /mL (18 @ 19:10)  CMV IgG Antibody: 5.40 U/mL (18 @ 17:51)  CMV IgG Antibody: >10.00 U/mL (18 @ 23:45)  HIV-1 RNA Quantitative, Viral Load Log: NOT DET. lg /mL (18 @ 19:25)    CMVPCR Log: NotDetec LogIU/mL (06-15 @ 13:45)  CMVPCR Log: NotDetec LogIU/mL ( @ 21:56)    Clostridium difficile GDH Toxins A&amp;B, EIA:   Negative (18 @ 17:16)  Clostridium difficile GDH Interpretation: Negative for toxigenic C. Difficile.  This specimen is negative for C.  Difficile glutamate dehydrogenase (GDH) antigen and negative for C.  Difficile Toxins A & B, by EIA.  GDH is a highly sensitive screening  marker for C. Difficile that is produced in large amounts by all C.  Difficile strains, both toxigenic and nontoxigenic.  This assay has not  been validated as a test of cure.  Repeat testing during the same episode  of diarrhea is of limited value and is discouraged.  The results of this  assay should always be interpreted in conjunction with pateint's clinical  history. (18 @ 17:16)      RADIOLOGY:

## 2018-06-21 NOTE — PROGRESS NOTE ADULT - PROBLEM SELECTOR PLAN 2
care as per  HF team  prednisone 7.5bid  Tacrolimus 1mg BID goal 8-10   Cellcept on hold at this time care as per  HF team  prednisone 7.5bid  Tacrolimus 1mg BID goal 8-10 11.3 today  Cellcept on hold at this time

## 2018-06-21 NOTE — PROGRESS NOTE ADULT - ASSESSMENT
68M s/p cardiac transplant 2/23/18 for NICM, on Tacrolimus, Cellcept and Prednisone and HCV acquired from donor on [place], admitted 6/13/18 with neutropenic fever 104.2F.   Treating as pneumonia, cough improved, essentially asymptomatic and afebrile since initial temp. Zosyn 6/13-6/17, switched to Cefepime 6/17 due to Pseudomonas aeruginosa on sputum culture with intermediate Zosyn susceptibility.   CT chest 6/15 with increased rounded opacity at LLL. Pneumonia vs post obstructive atelectasis? s/p IR biopsy and culture 6/18 - negative to date.   Fungitell negative but can miss other fungi; cryptococcus serum antigen negative. Voriconazole started 6/19 empirically.   Diarrhea resolved. C diff and stool cultures negative.   Neutropenia resolved, now leukocytosis >40 after filgrastim 6/13-6/16.   Reported by EMS to have bedbugs, none noted but developed right axillary skin nodules, likely inclusion cysts as per derm.     Recommend  -Cefepime 2GM IV q12 - plan to stop tomorrow 6/22   -Mepron for PCP PPX  -f/u LLL biopsy result and cultures   -continue Voriconazole empirically pending biopsy/culture results   -continue to follow Tacrolimus levels and adjust dose- especially while on Voriconazole due to QT prolonging effects

## 2018-06-21 NOTE — PROGRESS NOTE ADULT - SUBJECTIVE AND OBJECTIVE BOX
Subjective Hello oob in chair "i feel good'    VITAL SIGNS    Telemetry: NSR/ST        Vital Signs Last 24 Hrs  T(C): 36.7 (06-21-18 @ 12:13), Max: 36.9 (06-21-18 @ 05:28)  T(F): 98.1 (06-21-18 @ 12:13), Max: 98.4 (06-21-18 @ 05:28)  HR: 104 (06-21-18 @ 12:13) (101 - 108)  BP: 108/71 (06-21-18 @ 12:13) (108/71 - 130/87)  RR: 17 (06-21-18 @ 12:13) (17 - 18)  SpO2: 96% (06-21-18 @ 12:13) (94% - 96%)           06-20 @ 07:01  -  06-21 @ 07:00  --------------------------------------------------------  IN: 640 mL / OUT: 1100 mL / NET: -460 mL    06-21 @ 07:01  -  06-21 @ 18:17  --------------------------------------------------------  IN: 480 mL / OUT: 425 mL / NET: 55 mL  Daily Height in cm: 172.72 (21 Jun 2018 06:13)  MEDICATIONS  (STANDING):  aspirin enteric coated 81 milliGRAM(s) Oral daily  atovaquone Suspension 1500 milliGRAM(s) Oral daily  Calcium Citrate+D3 (315mg-250mg/tablet) 2 Tablet(s) 2 Tablet(s) Oral two times a day  cefepime   IVPB 2000 milliGRAM(s) IV Intermittent every 12 hours  famotidine    Tablet 20 milliGRAM(s) Oral daily  insulin glargine Injectable (LANTUS) 7 Unit(s) SubCutaneous at bedtime  insulin lispro (HumaLOG) corrective regimen sliding scale   SubCutaneous Before meals and at bedtime  insulin lispro Injectable (HumaLOG) 3 Unit(s) SubCutaneous before breakfast  insulin lispro Injectable (HumaLOG) 3 Unit(s) SubCutaneous before lunch  insulin lispro Injectable (HumaLOG) 3 Unit(s) SubCutaneous before dinner  magnesium oxide 400 milliGRAM(s) Oral every 12 hours  Mavyret 100mg/40mg 3 Tablet(s) 3 Tablet(s) Oral daily  multivitamin 1 Tablet(s) Oral daily  pravastatin 20 milliGRAM(s) Oral at bedtime  predniSONE   Tablet 7.5 milliGRAM(s) Oral daily  sodium bicarbonate 650 milliGRAM(s) Oral every 8 hours  sodium chloride 0.9% lock flush 3 milliLiter(s) IV Push every 8 hours  sodium chloride 0.9%. 1000 milliLiter(s) (50 mL/Hr) IV Continuous <Continuous>  tacrolimus 1 milliGRAM(s) Oral two times a day  valGANciclovir 450 milliGRAM(s) Oral two times a day  voriconazole 200 milliGRAM(s) Oral every 12 hours    MEDICATIONS  (PRN):    CAPILLARY BLOOD GLUCOSE      POCT Blood Glucose.: 101 mg/dL (21 Jun 2018 16:40)  POCT Blood Glucose.: 127 mg/dL (21 Jun 2018 12:28)  POCT Blood Glucose.: 115 mg/dL (21 Jun 2018 11:45)  POCT Blood Glucose.: 93 mg/dL (21 Jun 2018 07:30)  POCT Blood Glucose.: 119 mg/dL (20 Jun 2018 22:03)        PHYSICAL EXAM  Neurology: alert and oriented x 3, nonfocal, no gross deficits  CV :S1 S2 RRR  Sternal Wound :  healed  sternum stable  Lungs: B/l  breath sounds  Abdomen: soft, nontender, nondistended, positive bowel sounds, last bowel movement 6/21  :   Voids          Extremities:  B/l LE warm well perfused            Physical Therapy Rec:   Home  [ x ]      Discussed with Cardiothoracic Team at AM rounds.

## 2018-06-21 NOTE — PROGRESS NOTE ADULT - SUBJECTIVE AND OBJECTIVE BOX
Interval History:    Medications:  aspirin enteric coated 81 milliGRAM(s) Oral daily  atovaquone Suspension 1500 milliGRAM(s) Oral daily  Calcium Citrate+D3 (315mg-250mg/tablet) 2 Tablet(s) 2 Tablet(s) Oral two times a day  cefepime   IVPB 2000 milliGRAM(s) IV Intermittent every 12 hours  famotidine    Tablet 20 milliGRAM(s) Oral daily  insulin glargine Injectable (LANTUS) 7 Unit(s) SubCutaneous at bedtime  insulin lispro (HumaLOG) corrective regimen sliding scale   SubCutaneous Before meals and at bedtime  insulin lispro Injectable (HumaLOG) 3 Unit(s) SubCutaneous before breakfast  insulin lispro Injectable (HumaLOG) 3 Unit(s) SubCutaneous before lunch  insulin lispro Injectable (HumaLOG) 3 Unit(s) SubCutaneous before dinner  magnesium oxide 400 milliGRAM(s) Oral every 12 hours  Mavyret 100mg/40mg 3 Tablet(s) 3 Tablet(s) Oral daily  multivitamin 1 Tablet(s) Oral daily  pravastatin 20 milliGRAM(s) Oral at bedtime  predniSONE   Tablet 7.5 milliGRAM(s) Oral daily  sodium bicarbonate 650 milliGRAM(s) Oral every 8 hours  sodium chloride 0.9% lock flush 3 milliLiter(s) IV Push every 8 hours  sodium chloride 0.9%. 1000 milliLiter(s) IV Continuous <Continuous>  tacrolimus 2 milliGRAM(s) Oral <User Schedule>  valGANciclovir 450 milliGRAM(s) Oral two times a day  voriconazole 200 milliGRAM(s) Oral every 12 hours    Vital Signs Last 24 Hrs  T(C): 36.7 (21 Jun 2018 12:13), Max: 36.9 (21 Jun 2018 05:28)  T(F): 98.1 (21 Jun 2018 12:13), Max: 98.4 (21 Jun 2018 05:28)  HR: 104 (21 Jun 2018 12:13) (100 - 108)  BP: 108/71 (21 Jun 2018 12:13) (108/71 - 130/87)  RR: 17 (21 Jun 2018 12:13) (17 - 18)  SpO2: 96% (21 Jun 2018 12:13) (94% - 96%)    Weight (kg): 67.9 (06-21 @ 06:13)    I&O's Summary    20 Jun 2018 07:01  -  21 Jun 2018 07:00  --------------------------------------------------------  IN: 640 mL / OUT: 1100 mL / NET: -460 mL    21 Jun 2018 07:01  -  21 Jun 2018 13:09  --------------------------------------------------------  IN: 120 mL / OUT: 275 mL / NET: -155 mL        Physical Exam:  Appearance: No Acute Distress  HEENT: JVP <6 cm H2O, no HJR  Cardiovascular: tachy, regular S1 S2, No murmurs/rubs/gallops  Respiratory: Clear to auscultation bilaterally  Gastrointestinal: Soft, Non-tender, non-distended	  Skin: no skin lesions  Neurologic: Non-focal  Extremities: No LE edema, warm and well perfused  Psychiatry: A & O x 3, Mood & affect appropriate      Labs:                        9.3    41.2  )-----------( 300      ( 21 Jun 2018 07:28 )             30.2     06-21    140  |  103  |  16  ----------------------------<  90  4.3   |  23  |  1.04    Ca    9.3      21 Jun 2018 07:28        Tacrolimus trough 11.3 (11.9/14.4) Interval History: He is feeling well without any complaints. Asking to go home.    Medications:  aspirin enteric coated 81 milliGRAM(s) Oral daily  atovaquone Suspension 1500 milliGRAM(s) Oral daily  Calcium Citrate+D3 (315mg-250mg/tablet) 2 Tablet(s) 2 Tablet(s) Oral two times a day  cefepime   IVPB 2000 milliGRAM(s) IV Intermittent every 12 hours  famotidine    Tablet 20 milliGRAM(s) Oral daily  insulin glargine Injectable (LANTUS) 7 Unit(s) SubCutaneous at bedtime  insulin lispro (HumaLOG) corrective regimen sliding scale   SubCutaneous Before meals and at bedtime  insulin lispro Injectable (HumaLOG) 3 Unit(s) SubCutaneous before breakfast  insulin lispro Injectable (HumaLOG) 3 Unit(s) SubCutaneous before lunch  insulin lispro Injectable (HumaLOG) 3 Unit(s) SubCutaneous before dinner  magnesium oxide 400 milliGRAM(s) Oral every 12 hours  Mavyret 100mg/40mg 3 Tablet(s) 3 Tablet(s) Oral daily  multivitamin 1 Tablet(s) Oral daily  pravastatin 20 milliGRAM(s) Oral at bedtime  predniSONE   Tablet 7.5 milliGRAM(s) Oral daily  sodium bicarbonate 650 milliGRAM(s) Oral every 8 hours  sodium chloride 0.9% lock flush 3 milliLiter(s) IV Push every 8 hours  sodium chloride 0.9%. 1000 milliLiter(s) IV Continuous <Continuous>  tacrolimus 2 milliGRAM(s) Oral <User Schedule>  valGANciclovir 450 milliGRAM(s) Oral two times a day  voriconazole 200 milliGRAM(s) Oral every 12 hours    Vital Signs Last 24 Hrs  T(C): 36.7 (21 Jun 2018 12:13), Max: 36.9 (21 Jun 2018 05:28)  T(F): 98.1 (21 Jun 2018 12:13), Max: 98.4 (21 Jun 2018 05:28)  HR: 104 (21 Jun 2018 12:13) (100 - 108)  BP: 108/71 (21 Jun 2018 12:13) (108/71 - 130/87)  RR: 17 (21 Jun 2018 12:13) (17 - 18)  SpO2: 96% (21 Jun 2018 12:13) (94% - 96%)    Weight (kg): 67.9 (06-21 @ 06:13)    I&O's Summary    20 Jun 2018 07:01  -  21 Jun 2018 07:00  --------------------------------------------------------  IN: 640 mL / OUT: 1100 mL / NET: -460 mL    21 Jun 2018 07:01  -  21 Jun 2018 13:09  --------------------------------------------------------  IN: 120 mL / OUT: 275 mL / NET: -155 mL        Physical Exam:  Appearance: No Acute Distress  HEENT: JVP <6 cm H2O, no HJR  Cardiovascular: tachy, regular S1 S2, No murmurs/rubs/gallops  Respiratory: Clear to auscultation bilaterally  Gastrointestinal: Soft, Non-tender, non-distended	  Skin: no skin lesions  Neurologic: Non-focal  Extremities: No LE edema, warm and well perfused  Psychiatry: A & O x 3, Mood & affect appropriate      Labs:                        9.3    41.2  )-----------( 300      ( 21 Jun 2018 07:28 )             30.2     06-21    140  |  103  |  16  ----------------------------<  90  4.3   |  23  |  1.04    Ca    9.3      21 Jun 2018 07:28        Tacrolimus trough 11.3 (11.9/14.4)

## 2018-06-21 NOTE — PROGRESS NOTE ADULT - PROBLEM SELECTOR PLAN 2
- No sig change in graft function on recent TTE  - CellCept held given prior leukopenia and now infectious process  - Reduce tacrolimus to 1 mg Q12h for goal trough 8-10 given presumed fungal pneumonia and now on voriconazole.   - Prednisone reduced to 7.5 mg daily today (weekly taper).

## 2018-06-21 NOTE — PROGRESS NOTE ADULT - PROBLEM SELECTOR PLAN 1
- Will f/u cultures from CT-guided biopsy by IR done 6/18.  - Appreciate input from ID  - Continue empiric coverage with cefepime and voriconazole. - Will f/u cultures from CT-guided biopsy by IR done 6/18.  - Appreciate input from ID  - Per Dr. Lujan, will discontinue cefepime after tomorrow's dose and continue the voriconazole only.

## 2018-06-21 NOTE — PROGRESS NOTE ADULT - PROBLEM SELECTOR PLAN 1
ID consult Dr Tai Martinez TID-completed  d/c Cefepime in am 6/22  F/u blood cx  Voriconazole , r/o fungal source

## 2018-06-22 DIAGNOSIS — E11.9 TYPE 2 DIABETES MELLITUS WITHOUT COMPLICATIONS: ICD-10-CM

## 2018-06-22 LAB
ANION GAP SERPL CALC-SCNC: 12 MMOL/L — SIGNIFICANT CHANGE UP (ref 5–17)
BUN SERPL-MCNC: 15 MG/DL — SIGNIFICANT CHANGE UP (ref 7–23)
CALCIUM SERPL-MCNC: 9.3 MG/DL — SIGNIFICANT CHANGE UP (ref 8.4–10.5)
CHLORIDE SERPL-SCNC: 102 MMOL/L — SIGNIFICANT CHANGE UP (ref 96–108)
CO2 SERPL-SCNC: 24 MMOL/L — SIGNIFICANT CHANGE UP (ref 22–31)
CREAT SERPL-MCNC: 1.07 MG/DL — SIGNIFICANT CHANGE UP (ref 0.5–1.3)
GLUCOSE BLDC GLUCOMTR-MCNC: 101 MG/DL — HIGH (ref 70–99)
GLUCOSE BLDC GLUCOMTR-MCNC: 153 MG/DL — HIGH (ref 70–99)
GLUCOSE BLDC GLUCOMTR-MCNC: 165 MG/DL — HIGH (ref 70–99)
GLUCOSE BLDC GLUCOMTR-MCNC: 85 MG/DL — SIGNIFICANT CHANGE UP (ref 70–99)
GLUCOSE BLDC GLUCOMTR-MCNC: 90 MG/DL — SIGNIFICANT CHANGE UP (ref 70–99)
GLUCOSE SERPL-MCNC: 96 MG/DL — SIGNIFICANT CHANGE UP (ref 70–99)
HCT VFR BLD CALC: 30.5 % — LOW (ref 39–50)
HGB BLD-MCNC: 9 G/DL — LOW (ref 13–17)
MCHC RBC-ENTMCNC: 28.1 PG — SIGNIFICANT CHANGE UP (ref 27–34)
MCHC RBC-ENTMCNC: 29.5 GM/DL — LOW (ref 32–36)
MCV RBC AUTO: 95.4 FL — SIGNIFICANT CHANGE UP (ref 80–100)
NON-GYNECOLOGICAL CYTOLOGY STUDY: SIGNIFICANT CHANGE UP
PLATELET # BLD AUTO: 286 K/UL — SIGNIFICANT CHANGE UP (ref 150–400)
POTASSIUM SERPL-MCNC: 4.6 MMOL/L — SIGNIFICANT CHANGE UP (ref 3.5–5.3)
POTASSIUM SERPL-SCNC: 4.6 MMOL/L — SIGNIFICANT CHANGE UP (ref 3.5–5.3)
RBC # BLD: 3.2 M/UL — LOW (ref 4.2–5.8)
RBC # FLD: 20.8 % — HIGH (ref 10.3–14.5)
SODIUM SERPL-SCNC: 138 MMOL/L — SIGNIFICANT CHANGE UP (ref 135–145)
TACROLIMUS SERPL-MCNC: 12.2 NG/ML — SIGNIFICANT CHANGE UP
WBC # BLD: 31.3 K/UL — HIGH (ref 3.8–10.5)
WBC # FLD AUTO: 31.3 K/UL — HIGH (ref 3.8–10.5)

## 2018-06-22 PROCEDURE — 99232 SBSQ HOSP IP/OBS MODERATE 35: CPT | Mod: GC

## 2018-06-22 PROCEDURE — 99233 SBSQ HOSP IP/OBS HIGH 50: CPT

## 2018-06-22 RX ADMIN — Medication 7.5 MILLIGRAM(S): at 05:01

## 2018-06-22 RX ADMIN — VORICONAZOLE 200 MILLIGRAM(S): 10 INJECTION, POWDER, LYOPHILIZED, FOR SOLUTION INTRAVENOUS at 05:00

## 2018-06-22 RX ADMIN — Medication 3 UNIT(S): at 17:13

## 2018-06-22 RX ADMIN — Medication 650 MILLIGRAM(S): at 05:01

## 2018-06-22 RX ADMIN — FAMOTIDINE 20 MILLIGRAM(S): 10 INJECTION INTRAVENOUS at 14:28

## 2018-06-22 RX ADMIN — VALGANCICLOVIR 450 MILLIGRAM(S): 450 TABLET, FILM COATED ORAL at 05:00

## 2018-06-22 RX ADMIN — VORICONAZOLE 200 MILLIGRAM(S): 10 INJECTION, POWDER, LYOPHILIZED, FOR SOLUTION INTRAVENOUS at 17:14

## 2018-06-22 RX ADMIN — SODIUM CHLORIDE 3 MILLILITER(S): 9 INJECTION INTRAMUSCULAR; INTRAVENOUS; SUBCUTANEOUS at 14:05

## 2018-06-22 RX ADMIN — TACROLIMUS 1 MILLIGRAM(S): 5 CAPSULE ORAL at 07:53

## 2018-06-22 RX ADMIN — SODIUM CHLORIDE 3 MILLILITER(S): 9 INJECTION INTRAMUSCULAR; INTRAVENOUS; SUBCUTANEOUS at 21:02

## 2018-06-22 RX ADMIN — Medication 3 UNIT(S): at 12:02

## 2018-06-22 RX ADMIN — INSULIN GLARGINE 7 UNIT(S): 100 INJECTION, SOLUTION SUBCUTANEOUS at 00:17

## 2018-06-22 RX ADMIN — ATOVAQUONE 1500 MILLIGRAM(S): 750 SUSPENSION ORAL at 14:28

## 2018-06-22 RX ADMIN — CEFEPIME 100 MILLIGRAM(S): 1 INJECTION, POWDER, FOR SOLUTION INTRAMUSCULAR; INTRAVENOUS at 05:00

## 2018-06-22 RX ADMIN — TACROLIMUS 1 MILLIGRAM(S): 5 CAPSULE ORAL at 20:01

## 2018-06-22 RX ADMIN — Medication 20 MILLIGRAM(S): at 21:05

## 2018-06-22 RX ADMIN — Medication 650 MILLIGRAM(S): at 21:05

## 2018-06-22 RX ADMIN — Medication 3 UNIT(S): at 07:51

## 2018-06-22 RX ADMIN — MAGNESIUM OXIDE 400 MG ORAL TABLET 400 MILLIGRAM(S): 241.3 TABLET ORAL at 05:01

## 2018-06-22 RX ADMIN — SODIUM CHLORIDE 3 MILLILITER(S): 9 INJECTION INTRAMUSCULAR; INTRAVENOUS; SUBCUTANEOUS at 05:02

## 2018-06-22 RX ADMIN — VALGANCICLOVIR 450 MILLIGRAM(S): 450 TABLET, FILM COATED ORAL at 17:14

## 2018-06-22 RX ADMIN — Medication 81 MILLIGRAM(S): at 14:28

## 2018-06-22 RX ADMIN — MAGNESIUM OXIDE 400 MG ORAL TABLET 400 MILLIGRAM(S): 241.3 TABLET ORAL at 17:14

## 2018-06-22 RX ADMIN — Medication 1: at 12:02

## 2018-06-22 RX ADMIN — Medication 1 TABLET(S): at 14:28

## 2018-06-22 RX ADMIN — Medication 650 MILLIGRAM(S): at 14:29

## 2018-06-22 NOTE — PROGRESS NOTE ADULT - SUBJECTIVE AND OBJECTIVE BOX
Im getting ready     VITAL SIGNS    Telemetry:  NSR 90    Vital Signs Last 24 Hrs  T(C): 36.7 (18 @ 04:59), Max: 37 (18 @ 20:00)  T(F): 98 (18 @ 04:59), Max: 98.6 (18 @ 20:00)  HR: 108 (18 @ 04:59) (104 - 108)  BP: 126/88 (18 @ 04:59) (108/71 - 132/91)  RR: 18 (18 @ 04:59) (17 - 18)  SpO2: 95% (18 @ 04:59) (95% - 97%)                    @ 07:01  -   @ 07:00  --------------------------------------------------------  IN: 880 mL / OUT: 1275 mL / NET: -395 mL     @ 07:01  -   @ 12:03  --------------------------------------------------------  IN: 120 mL / OUT: 250 mL / NET: -130 mL          Daily     Daily Weight in k.7 (2018 10:58)        CAPILLARY BLOOD GLUCOSE      POCT Blood Glucose.: 165 mg/dL (2018 11:53)  POCT Blood Glucose.: 101 mg/dL (2018 07:42)  POCT Blood Glucose.: 153 mg/dL (2018 23:59)  POCT Blood Glucose.: 88 mg/dL (2018 21:37)  POCT Blood Glucose.: 101 mg/dL (2018 16:40)  POCT Blood Glucose.: 127 mg/dL (2018 12:28)                Coumadin    [ ] YES          [ x ]      NO                                   PHYSICAL EXAM        Neurology: alert and oriented x 3, nonfocal, no gross deficits  CV : .S1S2 RRR  Sternal Wound :  CDI , Stable  Lungs: bibasilar diminished  Abdomen: soft, nontender, nondistended, positive bowel sounds, last bowel movement this am  :         voiding    Extremities:     - edema - calve tenderness          aspirin enteric coated 81 milliGRAM(s) Oral daily  atovaquone Suspension 1500 milliGRAM(s) Oral daily  Calcium Citrate+D3 (315mg-250mg/tablet) 2 Tablet(s) 2 Tablet(s) Oral two times a day  cefepime   IVPB 2000 milliGRAM(s) IV Intermittent every 12 hours  famotidine    Tablet 20 milliGRAM(s) Oral daily  insulin glargine Injectable (LANTUS) 7 Unit(s) SubCutaneous at bedtime  insulin lispro (HumaLOG) corrective regimen sliding scale   SubCutaneous Before meals and at bedtime  insulin lispro Injectable (HumaLOG) 3 Unit(s) SubCutaneous before breakfast  insulin lispro Injectable (HumaLOG) 3 Unit(s) SubCutaneous before lunch  insulin lispro Injectable (HumaLOG) 3 Unit(s) SubCutaneous before dinner  magnesium oxide 400 milliGRAM(s) Oral every 12 hours  Mavyret 100mg/40mg 3 Tablet(s) 3 Tablet(s) Oral daily  multivitamin 1 Tablet(s) Oral daily  pravastatin 20 milliGRAM(s) Oral at bedtime  predniSONE   Tablet 7.5 milliGRAM(s) Oral daily  sodium bicarbonate 650 milliGRAM(s) Oral every 8 hours  sodium chloride 0.9% lock flush 3 milliLiter(s) IV Push every 8 hours  sodium chloride 0.9%. 1000 milliLiter(s) IV Continuous <Continuous>  tacrolimus 1 milliGRAM(s) Oral two times a day  valGANciclovir 450 milliGRAM(s) Oral two times a day  voriconazole 200 milliGRAM(s) Oral every 12 hours                    Physical Therapy Rec:   Home  [  ]   Home w/ PT  [  ]  Rehab  [  ]  Discussed with Cardiothoracic Team at AM rounds.

## 2018-06-22 NOTE — PROVIDER CONTACT NOTE (CRITICAL VALUE NOTIFICATION) - TEST AND RESULT REPORTED:
H/H 6.5/20.6   WBC 1.0
Hb 6.9/ WBC 1.04
WBC 43521
sputum culture collected on 06/19/2018, is (+) rare pseudomonas aeruginosa-Final
Hgb 6.8, WBC 1.0

## 2018-06-22 NOTE — PROGRESS NOTE ADULT - PROBLEM SELECTOR PLAN 6
- Continue atovaquone for PCP and toxo prophylaxis.   - Resumed valganciclovir 450 mg BID for CMV prophylaxis.

## 2018-06-22 NOTE — PROGRESS NOTE ADULT - PROBLEM SELECTOR PLAN 2
- No sig change in graft function on recent TTE  - CellCept held given prior leukopenia and now infectious process  - Reduce tacrolimus to 1 mg Q12h for goal trough 8-10 given presumed fungal pneumonia and now on voriconazole.   - Prednisone reduced to 7.5 mg daily today (weekly taper). - No sig change in graft function on recent TTE  - CellCept held given prior leukopenia and now infectious process  - Continue tacrolimus 1 mg Q12h for goal trough 8-10 given presumed fungal pneumonia and now on voriconazole. Not yet at steady state.  - Prednisone reduced to 7.5 mg daily on 6/21/18. Due to reduce to 5 mg next week on 6/28/18.

## 2018-06-22 NOTE — PROGRESS NOTE ADULT - ATTENDING COMMENTS
Patient seen and examined with Dr. Loving  I agree with his interval history and exam findings and plans as noted above    Completing antibiotics with Cefepime for pseudomonas pneumonia  LLL lung nodule biopsy : pathology and fungal results are pending  WBC trending back down after GCSF effects  Continue OI prophylaxis with Valganciclovir and Mepron  Voriconazole pending results of bx. and cultures    Gary Lujan MD  [phone]  After 5pm/weekends [phone]

## 2018-06-22 NOTE — PROGRESS NOTE ADULT - SUBJECTIVE AND OBJECTIVE BOX
Follow Up:  Fever, PNA    Interval History/ROS: No chills or fevers overnight.     Allergies  No Known Allergies        ANTIMICROBIALS:  atovaquone Suspension 1500 daily  cefepime   IVPB 2000 every 12 hours  valGANciclovir 450 two times a day  voriconazole 200 every 12 hours      OTHER MEDS:  MEDICATIONS  (STANDING):  aspirin enteric coated 81 daily  famotidine    Tablet 20 daily  insulin glargine Injectable (LANTUS) 7 at bedtime  insulin lispro (HumaLOG) corrective regimen sliding scale  Before meals and at bedtime  insulin lispro Injectable (HumaLOG) 3 before breakfast  insulin lispro Injectable (HumaLOG) 3 before lunch  insulin lispro Injectable (HumaLOG) 3 before dinner  pravastatin 20 at bedtime  predniSONE   Tablet 7.5 daily  tacrolimus 1 two times a day      Vital Signs Last 24 Hrs  T(C): 36.7 (22 Jun 2018 04:59), Max: 37 (21 Jun 2018 20:00)  T(F): 98 (22 Jun 2018 04:59), Max: 98.6 (21 Jun 2018 20:00)  HR: 108 (22 Jun 2018 04:59) (104 - 108)  BP: 126/88 (22 Jun 2018 04:59) (108/71 - 132/91)  BP(mean): --  RR: 18 (22 Jun 2018 04:59) (17 - 18)  SpO2: 95% (22 Jun 2018 04:59) (95% - 97%)    PHYSICAL EXAM:  General: non-toxic  HEAD/EYES: anicteric, PERRL  ENT:  supple  Cardiovascular:   S1, S2  Respiratory:  clear bilaterally  GI:  soft, non-tender, normal bowel sounds  :  no CVA tenderness   Musculoskeletal:  no synovitis  Neurologic:  grossly non-focal  Skin:  no rash  Lymph: no lymphadenopathy  Psychiatric:  appropriate affect  Vascular:  no phlebitis                                9.0    31.3  )-----------( 286      ( 22 Jun 2018 07:48 )             30.5       06-22    138  |  102  |  15  ----------------------------<  96  4.6   |  24  |  1.07    Ca    9.3      22 Jun 2018 07:48            MICROBIOLOGY:  v  .Blood Blood  06-20-18   No growth to date.  --  --      .Blood Blood  06-20-18   No growth to date.  --  --      .Sputum Sputum  06-20-18   Rare Pseudomonas aeruginosa (Carbapenem Resistant)  Normal Respiratory Heaven present  --  Pseudomonas aeruginosa (Carbapenem Resistant)      .Body Fluid Interstitial Fluid  06-19-18   No growth  --    No polymorphonuclear cells seen  No organisms seen  by cytocentrifuge      .Stool Feces  06-14-18   No enteric pathogens isolated.  (Stool culture examined for Salmonella,  Shigella, Campylobacter, Aeromonas, Plesiomonas,  Vibrio, E.coli O157 and Yersinia)  --  --      .Sputum Sputum  06-13-18   Rare Pseudomonas aeruginosa (Carbapenem Resistant)  Normal Respiratory Heaven present  --  Pseudomonas aeruginosa (Carbapenem Resistant)      .Blood Blood-Peripheral  06-13-18   No growth at 5 days.  --  --      .Urine Clean Catch (Midstream)  06-13-18   Culture grew 3 or more types of organisms which indicate  collection contamination; consider recollection only if clinically  indicated.  --  --      Skin Skin, abdomen  05-29-18   No growth at 48 hours  --  --      .Blood Blood-Peripheral  05-25-18   No growth at 5 days.  --  --      .Urine Clean Catch (Midstream)  05-24-18   10,000 - 49,000 CFU/mL Klebsiella oxytoca  --  Klebsiella oxytoca      .Blood Blood-Peripheral  05-24-18   No growth at 5 days.  --  --        Toxoplasma IgG Screen: 4.7 IU/mL (06-14-18 @ 09:42)  HIV-1 RNA Quantitative, Viral Load Log: NOT DET. lg /mL (05-31-18 @ 19:10)  CMV IgG Antibody: 5.40 U/mL (05-25-18 @ 17:51)  CMV IgG Antibody: >10.00 U/mL (02-22-18 @ 23:45)  HIV-1 RNA Quantitative, Viral Load Log: NOT DET. lg /mL (02-02-18 @ 19:25)    CMVPCR Log: NotDetec LogIU/mL (06-15 @ 13:45)        RADIOLOGY: Follow Up:  Fever, PNA    Interval History/ROS: No chills or fevers overnight. Notes a dry cough which is more pronounced today. Denies N/V/D.     Allergies  No Known Allergies    ANTIMICROBIALS:  atovaquone Suspension 1500 daily  cefepime   IVPB 2000 every 12 hours  valGANciclovir 450 two times a day  voriconazole 200 every 12 hours      OTHER MEDS:  MEDICATIONS  (STANDING):  aspirin enteric coated 81 daily  famotidine    Tablet 20 daily  insulin glargine Injectable (LANTUS) 7 at bedtime  insulin lispro (HumaLOG) corrective regimen sliding scale  Before meals and at bedtime  insulin lispro Injectable (HumaLOG) 3 before breakfast  insulin lispro Injectable (HumaLOG) 3 before lunch  insulin lispro Injectable (HumaLOG) 3 before dinner  pravastatin 20 at bedtime  predniSONE   Tablet 7.5 daily  tacrolimus 1 two times a day      Vital Signs Last 24 Hrs  T(C): 36.7 (22 Jun 2018 04:59), Max: 37 (21 Jun 2018 20:00)  T(F): 98 (22 Jun 2018 04:59), Max: 98.6 (21 Jun 2018 20:00)  HR: 108 (22 Jun 2018 04:59) (104 - 108)  BP: 126/88 (22 Jun 2018 04:59) (108/71 - 132/91)  BP(mean): --  RR: 18 (22 Jun 2018 04:59) (17 - 18)  SpO2: 95% (22 Jun 2018 04:59) (95% - 97%)    PHYSICAL EXAMINATION:  General: Alert and Awake, NAD  HEENT: PERRL, EOMI, No subconjunctival hemorrhages, Oropharynx Clear, MMM  Neck: Supple, No ROSA ELENA  Cardiac: RRR, No M/R/G  Resp: CTAB, No Wh/Rh/Ra  Abdomen: NBS, NT/ND, No HSM, No rigidity or guarding  MSK: No LE edema. No calf tenderness  : No diaz  Skin: No rashes. Skin is warm and dry to the touch.   Neuro: Alert and Awake. CN 2-12 Grossly intact. Moves all four extremities spontaneously.                            9.0    31.3  )-----------( 286      ( 22 Jun 2018 07:48 )             30.5       06-22    138  |  102  |  15  ----------------------------<  96  4.6   |  24  |  1.07    Ca    9.3      22 Jun 2018 07:48            MICROBIOLOGY:  v  .Blood Blood  06-20-18   No growth to date.  --  --      .Blood Blood  06-20-18   No growth to date.  --  --      .Sputum Sputum  06-20-18   Rare Pseudomonas aeruginosa (Carbapenem Resistant)  Normal Respiratory Heaven present  --  Pseudomonas aeruginosa (Carbapenem Resistant)      .Body Fluid Interstitial Fluid  06-19-18   No growth  --    No polymorphonuclear cells seen  No organisms seen  by cytocentrifuge      .Stool Feces  06-14-18   No enteric pathogens isolated.  (Stool culture examined for Salmonella,  Shigella, Campylobacter, Aeromonas, Plesiomonas,  Vibrio, E.coli O157 and Yersinia)  --  --      .Sputum Sputum  06-13-18   Rare Pseudomonas aeruginosa (Carbapenem Resistant)  Normal Respiratory Heaven present  --  Pseudomonas aeruginosa (Carbapenem Resistant)      .Blood Blood-Peripheral  06-13-18   No growth at 5 days.  --  --      .Urine Clean Catch (Midstream)  06-13-18   Culture grew 3 or more types of organisms which indicate  collection contamination; consider recollection only if clinically  indicated.  --  --      Skin Skin, abdomen  05-29-18   No growth at 48 hours  --  --      .Blood Blood-Peripheral  05-25-18   No growth at 5 days.  --  --    .Urine Clean Catch (Midstream)  05-24-18   10,000 - 49,000 CFU/mL Klebsiella oxytoca  --  Klebsiella oxytoca    .Blood Blood-Peripheral  05-24-18   No growth at 5 days.  --  --    Toxoplasma IgG Screen: 4.7 IU/mL (06-14-18 @ 09:42)  HIV-1 RNA Quantitative, Viral Load Log: NOT DET. lg /mL (05-31-18 @ 19:10)  CMV IgG Antibody: 5.40 U/mL (05-25-18 @ 17:51)  CMV IgG Antibody: >10.00 U/mL (02-22-18 @ 23:45)  HIV-1 RNA Quantitative, Viral Load Log: NOT DET. lg /mL (02-02-18 @ 19:25)    CMVPCR Log: NotDetec LogIU/mL (06-15 @ 13:45)    RADIOLOGY:    No new imaging available for review

## 2018-06-22 NOTE — PROGRESS NOTE ADULT - SUBJECTIVE AND OBJECTIVE BOX
Interval History: has a bit of a dry cough, but nothing productive, not bothersome. Generally feels well. Reports that his brother had the house cleaned in his absence.    Medications:  aspirin enteric coated 81 milliGRAM(s) Oral daily  atovaquone Suspension 1500 milliGRAM(s) Oral daily  Calcium Citrate+D3 (315mg-250mg/tablet) 2 Tablet(s) 2 Tablet(s) Oral two times a day  cefepime   IVPB 2000 milliGRAM(s) IV Intermittent every 12 hours  famotidine    Tablet 20 milliGRAM(s) Oral daily  insulin glargine Injectable (LANTUS) 7 Unit(s) SubCutaneous at bedtime  insulin lispro (HumaLOG) corrective regimen sliding scale   SubCutaneous Before meals and at bedtime  insulin lispro Injectable (HumaLOG) 3 Unit(s) SubCutaneous before breakfast  insulin lispro Injectable (HumaLOG) 3 Unit(s) SubCutaneous before lunch  insulin lispro Injectable (HumaLOG) 3 Unit(s) SubCutaneous before dinner  magnesium oxide 400 milliGRAM(s) Oral every 12 hours  Mavyret 100mg/40mg 3 Tablet(s) 3 Tablet(s) Oral daily  multivitamin 1 Tablet(s) Oral daily  pravastatin 20 milliGRAM(s) Oral at bedtime  predniSONE   Tablet 7.5 milliGRAM(s) Oral daily  sodium bicarbonate 650 milliGRAM(s) Oral every 8 hours  sodium chloride 0.9% lock flush 3 milliLiter(s) IV Push every 8 hours  sodium chloride 0.9%. 1000 milliLiter(s) IV Continuous <Continuous>  tacrolimus 1 milliGRAM(s) Oral two times a day  valGANciclovir 450 milliGRAM(s) Oral two times a day  voriconazole 200 milliGRAM(s) Oral every 12 hours    Vital Signs Last 24 Hrs  T(C): 36.7 (2018 04:59), Max: 37 (2018 20:00)  T(F): 98 (2018 04:59), Max: 98.6 (2018 20:00)  HR: 108 (2018 04:59) (104 - 108)  BP: 126/88 (2018 04:59) (108/71 - 132/91)  RR: 18 (2018 04:59) (17 - 18)  SpO2: 95% (2018 04:59) (95% - 97%)    Daily     Daily Weight in k.7 (2018 10:58)    Weight (kg): 67.9 ( @ 06:13)    I&O's Summary    2018 07:  -  2018 07:00  --------------------------------------------------------  IN: 880 mL / OUT: 1275 mL / NET: -395 mL    2018 07:  -  2018 11:56  --------------------------------------------------------  IN: 120 mL / OUT: 250 mL / NET: -130 mL      Physical Exam:  Appearance: No Acute Distress  HEENT: JVP <6 cm H2O, no HJR  Cardiovascular: tachy, regular S1 S2, No murmurs/rubs/gallops  Respiratory: Clear to auscultation bilaterally  Gastrointestinal: Soft, Non-tender, non-distended	  Skin: no skin lesions  Neurologic: Non-focal  Extremities: No LE edema, warm and well perfused  Psychiatry: A & O x 3, Mood & affect appropriate      Labs:                        9.0    31.3  )-----------( 286      ( 2018 07:48 )             30.5     -    138  |  102  |  15  ----------------------------<  96  4.6   |  24  |  1.07    Ca    9.3      2018 07:48      Tacrolimus trough 12.2

## 2018-06-22 NOTE — PROVIDER CONTACT NOTE (CRITICAL VALUE NOTIFICATION) - ASSESSMENT
Patient asymptomatic
Patient is asymptomatic, v/s checked and recorded
patient stable, on cefepime ivpb every 12 hours

## 2018-06-22 NOTE — PROVIDER CONTACT NOTE (CRITICAL VALUE NOTIFICATION) - BACKGROUND
Patient with known low H/H and WBC being monitored closely
Patient is neutropenic and series CBC monitoring for H/H
Patient with known low H/H and WBC being monitored closely
S/P LVAD on 06/17/2018
s/p heart transplant, Netropenia

## 2018-06-22 NOTE — PROGRESS NOTE ADULT - PROBLEM SELECTOR PLAN 2
care as per  HF team  prednisone 7.5bid  Tacrolimus 1mg BID goal 8-10 11.3 today  Cellcept on hold at this time

## 2018-06-22 NOTE — PROGRESS NOTE ADULT - PROBLEM SELECTOR PLAN 1
- Will f/u cultures from CT-guided biopsy by IR done 6/18.  - Appreciate input from ID  - Per Dr. Lujan, will discontinue cefepime after tomorrow's dose and continue the voriconazole only. - Awaiting culture data from CT-guided biopsy by IR done 6/18.  - Appreciate input from ID  - Per Dr. Lujan, will discontinue cefepime after today and continue the voriconazole only.

## 2018-06-22 NOTE — PROVIDER CONTACT NOTE (CRITICAL VALUE NOTIFICATION) - ACTION/TREATMENT ORDERED:
Pt will receive 1 unit pRBC
No interventions now. Keep monitoring H/H q 6 h
No interventions now. Keep monitoring H/H q 6 h
patient safety continue to monitor.
will continue to monitor

## 2018-06-22 NOTE — PROGRESS NOTE ADULT - ASSESSMENT
68M s/p cardiac transplant 2/23/18 for NICM, on Tacrolimus, Cellcept and Prednisone and HCV acquired from donor on Mavyret, admitted 6/13/18 with neutropenic fever 104.2F.   Treating as pneumonia, cough improved, essentially asymptomatic and afebrile since initial temp. Zosyn 6/13-6/17, switched to Cefepime 6/17 due to Pseudomonas aeruginosa on sputum culture with intermediate Zosyn susceptibility. CT chest 6/15 with increased rounded opacity at LLL. Pneumonia vs post obstructive atelectasis? s/p IR biopsy and culture 6/18 - negative to date.   Fungitell negative but can miss other fungi; cryptococcus serum antigen negative. Voriconazole started 6/19 empirically. Diarrhea resolved. C diff and stool cultures negative. Neutropenia resolved, now leukocytosis >40 after filgrastim 6/13-6/16. Reported by EMS to have bedbugs, none noted but developed right axillary skin nodules, likely inclusion cysts as per derm.    Overall, initially neutropenic fever with pseudomonas pneumonia with question of fungal component given nodule. Cultures pending. Would continue empiric coverage with Voriconazole while awaiting fungal cultures and pathology. Would stop Cefepime after today.     Recommend  --Continue Cefepime 2GM IV q12 (end date 6/22/18)  --Continue Mepron for PCP PPX  --F/U LLL biopsy pathology and cultures   --Continue Voriconazole empirically pending biopsy/culture results   --Continue to follow Tacrolimus levels and adjust dose- especially while on Voriconazole due to QT prolonging effects

## 2018-06-22 NOTE — PROGRESS NOTE ADULT - ASSESSMENT
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 EF 43% His course also complicated by bilateral IJ/subclavian  thrombi, small  persistent right pleural effusion as well as acute on chronic renal failure. 5/23/18 admitted for hyperglycemia, hyponatremia and acute kidney injury  Today 6/18 BIBEMS to [facility] ED reports fever  rigors, labored breathing productive cough with clear sputum  for past two days ,unable to give himself injections r/t shaking home RN called 911    In ER, sinus tach 130s improved to 110s after 1L IVF and vanc/zosyn infusions. /75, satting high 90s on 2L NC, w resp rate 22. Rectal temp recorded 38.8C.  Admitted to [address] [place] [address] SDU  Seen by heart failure team ananya callahan      6/14 - HCT =20.6 Packed RBCs ordered by DR. Stahl- will check HCT 2 hrs post-transfusion- if HCT still low - will transfuse a 2nd unit RBC  echo done - unchanged from last echo  tacro level 8.2 - will d/w Dr. Stahl-pt to remain in Step down unit on neutropenic precautions for WBC 1  Optho consult appreciated to r/o CMV retinitis - eyes dilated  Heme consult-to see pt in am - persistent anemia - etiology - ?hemolysis ?bone marrow ?blood loss  6/15 wbc remans low  Tacro >12, dose changed 7 bid  Ct scan pending r/o pna.  Nephrology consult called for elevated creat to 1.6  3  raised areas under his R armpit, uncertain etiology, Derm consulted.  6/16 Tacra level 11.7 - continue tacro 7 bid. maintain level around 10  6/17 unable to obtain blood specimen from pt. after 3 attempts - pt refusing further attempts - spoke with DR. Stahl - will get labs @ 7pm prior to 8pm tacra dose along with all other labs. d/c planning- ID switched to Cefepime 2g IV q12h for better coverage  6/18 Chest ct with LLL mass/abcess.  Pt remains with cough, wbc up but could be r/t tx, afebrile.  Will obtain a biopsy in IR.  IR called.   IR re contacted by Dr Lozano , d/w Dr. Fuentes. biopsy today.  Aldactone increased for hypokalemia  6/19  Await biopsy results, will d/w id the ongoing antibiotic coverage.   Derm was consulted for lesions under R axilla on 6/16 :Lesions are consistent with epidermal inclusion cysts,outpatient tx  WBC 40 this am, d/w Dr Mccabe, afebrile, voriconozole started.  Prograf dose decreased  2nd to potentiation from voriconozole  Pulmonary consult called-rec f/u biopsy, no bronchoscopy at this time  6/20 Wbc up to 43. Await cultures  6/21 Leukocytosis 41.2  trending down. As per transplant team  tacrolimus 1mg bid  prednisone 7.5 mg Id recommending to  D/c cefepime6/22 continue Voriconazole empirically pending biopsy/culture results   6/22 Awaiting biopsy results.  WBC trending down. Remains on cefipime.  Will discuss with ID

## 2018-06-22 NOTE — CHART NOTE - NSCHARTNOTEFT_GEN_A_CORE
Source: Patient [X ]    Family [ ]     other [X ] RN, Medical record     Pt seen for nutritional follow up. Pt reports a good PO intake at meal times. Pt continues to ask for sausage, RD reviewed Na content of sausage and Pt verbalized understanding. Pt was amenable to Heart healthy and DM education reinforcement. Pt verbalized understanding. Pt denies GI distress and denies any nutritional complaints at this time.    Pt admitted for neutropenic fevers, and anemia. Per chart, Pt with history of NICM, s/p HM2 LVAD implant in 6/2017, recurrent GIB and heart transplant 2/2018. Fevers resolved. Pt with sepsis 2/2 PNA and now NORMAN. S/p Lung Biopsy today, awaiting results.     Diet : Consistent CHO, Low Na       Patient reports no GI distress.      PO intake:  % of meals.      Source for PO intake [X ] Patient [ ] family [ ] chart [ ] staff [ ] other      Current Weight: Weight (kg): 69kg,   Admission wt: 794kg. Fluctuations seen, likely fluids shifts   % Weight Change    Pertinent Medications: MEDICATIONS  (STANDING):  aspirin enteric coated 81 milliGRAM(s) Oral daily  atovaquone Suspension 1500 milliGRAM(s) Oral daily  Calcium Citrate+D3 (315mg-250mg/tablet) 2 Tablet(s) 2 Tablet(s) Oral two times a day  cefepime   IVPB 2000 milliGRAM(s) IV Intermittent every 12 hours  famotidine    Tablet 20 milliGRAM(s) Oral daily  insulin glargine Injectable (LANTUS) 7 Unit(s) SubCutaneous at bedtime  insulin lispro (HumaLOG) corrective regimen sliding scale   SubCutaneous Before meals and at bedtime  insulin lispro Injectable (HumaLOG) 3 Unit(s) SubCutaneous before breakfast  insulin lispro Injectable (HumaLOG) 3 Unit(s) SubCutaneous before lunch  insulin lispro Injectable (HumaLOG) 3 Unit(s) SubCutaneous before dinner  magnesium oxide 400 milliGRAM(s) Oral every 12 hours  Mavyret 100mg/40mg 3 Tablet(s) 3 Tablet(s) Oral daily  multivitamin 1 Tablet(s) Oral daily  pravastatin 20 milliGRAM(s) Oral at bedtime  predniSONE   Tablet 7.5 milliGRAM(s) Oral daily  sodium bicarbonate 650 milliGRAM(s) Oral every 8 hours  sodium chloride 0.9% lock flush 3 milliLiter(s) IV Push every 8 hours  sodium chloride 0.9%. 1000 milliLiter(s) (50 mL/Hr) IV Continuous <Continuous>  tacrolimus 1 milliGRAM(s) Oral two times a day  valGANciclovir 450 milliGRAM(s) Oral two times a day  voriconazole 200 milliGRAM(s) Oral every 12 hours    MEDICATIONS  (PRN):    Pertinent Labs:  Hgb/Hct:9.0/30.5-low, Na:138, K:4.6, Cl:102, BUN:15, Cr:1.07, Glucose:96, Ca:9.3, BG levels: 6/21: , 6/22: 101    Skin: intact, +1 right wrist/hand     Estimated Needs:   [ X] no change since previous assessment  [ ] recalculated:       Previous Nutrition Diagnosis: none       New Nutrition Diagnosis: [X ] not applicable       Interventions:     Recommend    1. Provide food preferences as requested by Pt/family within diet restrictions    2. Encourage PO intake during meal times   3. Reviewed menu ordering procedures   4. Continue Diet education as Pt amenable   5. trend BG levels, renal indices, LFT's and electrolytes        Monitoring and Evaluation:     [X ] PO intake [ X] Tolerance to diet prescription [X ] weights [X ] follow up per protocol    [ X] other: RD remains available Sarah Siegler RD, [facility] Pager #[phone]

## 2018-06-23 LAB
CULTURE RESULTS: NO GROWTH — SIGNIFICANT CHANGE UP
GLUCOSE BLDC GLUCOMTR-MCNC: 111 MG/DL — HIGH (ref 70–99)
GLUCOSE BLDC GLUCOMTR-MCNC: 131 MG/DL — HIGH (ref 70–99)
GLUCOSE BLDC GLUCOMTR-MCNC: 194 MG/DL — HIGH (ref 70–99)
GLUCOSE BLDC GLUCOMTR-MCNC: 90 MG/DL — SIGNIFICANT CHANGE UP (ref 70–99)
GLUCOSE BLDC GLUCOMTR-MCNC: 99 MG/DL — SIGNIFICANT CHANGE UP (ref 70–99)
HCT VFR BLD CALC: 26.6 % — LOW (ref 39–50)
HGB BLD-MCNC: 8.4 G/DL — LOW (ref 13–17)
MCHC RBC-ENTMCNC: 30.4 PG — SIGNIFICANT CHANGE UP (ref 27–34)
MCHC RBC-ENTMCNC: 31.5 GM/DL — LOW (ref 32–36)
MCV RBC AUTO: 96.5 FL — SIGNIFICANT CHANGE UP (ref 80–100)
PLATELET # BLD AUTO: 249 K/UL — SIGNIFICANT CHANGE UP (ref 150–400)
RBC # BLD: 2.76 M/UL — LOW (ref 4.2–5.8)
RBC # FLD: 20.8 % — HIGH (ref 10.3–14.5)
SPECIMEN SOURCE: SIGNIFICANT CHANGE UP
TACROLIMUS SERPL-MCNC: 14.4 NG/ML — SIGNIFICANT CHANGE UP
WBC # BLD: 22.9 K/UL — HIGH (ref 3.8–10.5)
WBC # FLD AUTO: 22.9 K/UL — HIGH (ref 3.8–10.5)

## 2018-06-23 PROCEDURE — 99233 SBSQ HOSP IP/OBS HIGH 50: CPT

## 2018-06-23 RX ORDER — TACROLIMUS 5 MG/1
0.5 CAPSULE ORAL ONCE
Qty: 0 | Refills: 0 | Status: COMPLETED | OUTPATIENT
Start: 2018-06-23 | End: 2018-06-23

## 2018-06-23 RX ORDER — TACROLIMUS 5 MG/1
0.5 CAPSULE ORAL ONCE
Qty: 0 | Refills: 0 | Status: DISCONTINUED | OUTPATIENT
Start: 2018-06-23 | End: 2018-06-23

## 2018-06-23 RX ORDER — TACROLIMUS 5 MG/1
0.5 CAPSULE ORAL
Qty: 0 | Refills: 0 | Status: DISCONTINUED | OUTPATIENT
Start: 2018-06-24 | End: 2018-06-25

## 2018-06-23 RX ORDER — TACROLIMUS 5 MG/1
0.5 CAPSULE ORAL
Qty: 0 | Refills: 0 | Status: DISCONTINUED | OUTPATIENT
Start: 2018-06-23 | End: 2018-06-23

## 2018-06-23 RX ADMIN — TACROLIMUS 1 MILLIGRAM(S): 5 CAPSULE ORAL at 07:57

## 2018-06-23 RX ADMIN — Medication 1: at 12:29

## 2018-06-23 RX ADMIN — VALGANCICLOVIR 450 MILLIGRAM(S): 450 TABLET, FILM COATED ORAL at 17:22

## 2018-06-23 RX ADMIN — Medication 20 MILLIGRAM(S): at 22:17

## 2018-06-23 RX ADMIN — SODIUM CHLORIDE 3 MILLILITER(S): 9 INJECTION INTRAMUSCULAR; INTRAVENOUS; SUBCUTANEOUS at 14:20

## 2018-06-23 RX ADMIN — Medication 650 MILLIGRAM(S): at 05:22

## 2018-06-23 RX ADMIN — MAGNESIUM OXIDE 400 MG ORAL TABLET 400 MILLIGRAM(S): 241.3 TABLET ORAL at 17:23

## 2018-06-23 RX ADMIN — SODIUM CHLORIDE 3 MILLILITER(S): 9 INJECTION INTRAMUSCULAR; INTRAVENOUS; SUBCUTANEOUS at 05:23

## 2018-06-23 RX ADMIN — INSULIN GLARGINE 7 UNIT(S): 100 INJECTION, SOLUTION SUBCUTANEOUS at 22:16

## 2018-06-23 RX ADMIN — INSULIN GLARGINE 7 UNIT(S): 100 INJECTION, SOLUTION SUBCUTANEOUS at 00:51

## 2018-06-23 RX ADMIN — VALGANCICLOVIR 450 MILLIGRAM(S): 450 TABLET, FILM COATED ORAL at 05:22

## 2018-06-23 RX ADMIN — FAMOTIDINE 20 MILLIGRAM(S): 10 INJECTION INTRAVENOUS at 12:30

## 2018-06-23 RX ADMIN — Medication 650 MILLIGRAM(S): at 22:17

## 2018-06-23 RX ADMIN — Medication 3 UNIT(S): at 12:30

## 2018-06-23 RX ADMIN — ATOVAQUONE 1500 MILLIGRAM(S): 750 SUSPENSION ORAL at 12:29

## 2018-06-23 RX ADMIN — Medication 7.5 MILLIGRAM(S): at 05:23

## 2018-06-23 RX ADMIN — TACROLIMUS 0.5 MILLIGRAM(S): 5 CAPSULE ORAL at 20:01

## 2018-06-23 RX ADMIN — Medication 81 MILLIGRAM(S): at 12:30

## 2018-06-23 RX ADMIN — SODIUM CHLORIDE 3 MILLILITER(S): 9 INJECTION INTRAMUSCULAR; INTRAVENOUS; SUBCUTANEOUS at 22:17

## 2018-06-23 RX ADMIN — VORICONAZOLE 200 MILLIGRAM(S): 10 INJECTION, POWDER, LYOPHILIZED, FOR SOLUTION INTRAVENOUS at 17:22

## 2018-06-23 RX ADMIN — Medication 3 UNIT(S): at 17:19

## 2018-06-23 RX ADMIN — Medication 1 TABLET(S): at 12:30

## 2018-06-23 RX ADMIN — Medication 650 MILLIGRAM(S): at 14:23

## 2018-06-23 RX ADMIN — VORICONAZOLE 200 MILLIGRAM(S): 10 INJECTION, POWDER, LYOPHILIZED, FOR SOLUTION INTRAVENOUS at 05:22

## 2018-06-23 RX ADMIN — Medication 3 UNIT(S): at 07:46

## 2018-06-23 RX ADMIN — MAGNESIUM OXIDE 400 MG ORAL TABLET 400 MILLIGRAM(S): 241.3 TABLET ORAL at 05:23

## 2018-06-23 NOTE — PROGRESS NOTE ADULT - SUBJECTIVE AND OBJECTIVE BOX
Interval History: feels well and without complaints. Spends most of the day in the chair. Not walking much, but not for any physical limitation.    Medications:  aspirin enteric coated 81 milliGRAM(s) Oral daily  atovaquone Suspension 1500 milliGRAM(s) Oral daily  Calcium Citrate+D3 (315mg-250mg/tablet) 2 Tablet(s) 2 Tablet(s) Oral two times a day  famotidine    Tablet 20 milliGRAM(s) Oral daily  insulin glargine Injectable (LANTUS) 7 Unit(s) SubCutaneous at bedtime  insulin lispro (HumaLOG) corrective regimen sliding scale   SubCutaneous Before meals and at bedtime  insulin lispro Injectable (HumaLOG) 3 Unit(s) SubCutaneous before breakfast  insulin lispro Injectable (HumaLOG) 3 Unit(s) SubCutaneous before lunch  insulin lispro Injectable (HumaLOG) 3 Unit(s) SubCutaneous before dinner  magnesium oxide 400 milliGRAM(s) Oral every 12 hours  Mavyret 100mg/40mg 3 Tablet(s) 3 Tablet(s) Oral daily  multivitamin 1 Tablet(s) Oral daily  pravastatin 20 milliGRAM(s) Oral at bedtime  predniSONE   Tablet 7.5 milliGRAM(s) Oral daily  sodium bicarbonate 650 milliGRAM(s) Oral every 8 hours  sodium chloride 0.9% lock flush 3 milliLiter(s) IV Push every 8 hours  sodium chloride 0.9%. 1000 milliLiter(s) IV Continuous <Continuous>  tacrolimus 1 milliGRAM(s) Oral two times a day  valGANciclovir 450 milliGRAM(s) Oral two times a day  voriconazole 200 milliGRAM(s) Oral every 12 hours    Vital Signs Last 24 Hrs  T(C): 36.7 (23 Jun 2018 06:48), Max: 36.8 (22 Jun 2018 14:18)  T(F): 98.1 (23 Jun 2018 06:48), Max: 98.2 (22 Jun 2018 14:18)  HR: 101 (23 Jun 2018 06:48) (101 - 106)  BP: 117/85 (23 Jun 2018 06:48) (105/73 - 117/85)  RR: 18 (23 Jun 2018 06:48) (17 - 18)  SpO2: 96% (23 Jun 2018 06:48) (96% - 98%)        I&O's Summary    22 Jun 2018 07:01  -  23 Jun 2018 07:00  --------------------------------------------------------  IN: 920 mL / OUT: 550 mL / NET: 370 mL        Physical Exam:  Appearance: No Acute Distress  HEENT: JVP <6 cm H2O, no HJR  Cardiovascular: RRR, Normal S1 S2, No murmurs/rubs/gallops  Respiratory: Clear to auscultation bilaterally  Gastrointestinal: Soft, Non-tender, non-distended	  Skin: no skin lesions  Neurologic: Non-focal  Extremities: No LE edema, warm and well perfused  Psychiatry: A & O x 3, Mood & affect appropriate      Labs:                        8.4    22.9  )-----------( 249      ( 23 Jun 2018 07:39 )             26.6       Tacrolimus trough 14.4

## 2018-06-23 NOTE — PROGRESS NOTE ADULT - PROBLEM SELECTOR PLAN 2
- No sig change in graft function on recent TTE  - CellCept held given prior leukopenia and now infectious process  - Continue tacrolimus 1 mg Q12h for goal trough 8-10 given presumed fungal pneumonia and now on voriconazole. Not yet at steady state.  - Prednisone reduced to 7.5 mg daily on 6/21/18. Due to reduce to 5 mg next week on 6/28/18. - No sig change in graft function on recent TTE  - CellCept held given prior leukopenia and now infectious process  - Further reduce tacrolimus to 0.5 mg Q12h for goal trough 8-10 given presumed fungal pneumonia and now on voriconazole.  - Prednisone reduced to 7.5 mg daily on 6/21/18. Due to reduce to 5 mg next week on 6/28/18.

## 2018-06-23 NOTE — PROGRESS NOTE ADULT - ASSESSMENT
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 EF 43% His course also complicated by bilateral IJ/subclavian  thrombi, small  persistent right pleural effusion as well as acute on chronic renal failure. 5/23/18 admitted for hyperglycemia, hyponatremia and acute kidney injury  Today 6/18 BIBEMS to [facility] ED reports fever  rigors, labored breathing productive cough with clear sputum  for past two days ,unable to give himself injections r/t shaking home RN called 911    In ER, sinus tach 130s improved to 110s after 1L IVF and vanc/zosyn infusions. /75, satting high 90s on 2L NC, w resp rate 22. Rectal temp recorded 38.8C.  Admitted to [address] [place] [address] SDU  Seen by heart failure team ananya callahan      6/14 - HCT =20.6 Packed RBCs ordered by DR. Stahl- will check HCT 2 hrs post-transfusion- if HCT still low - will transfuse a 2nd unit RBC  echo done - unchanged from last echo  tacro level 8.2 - will d/w Dr. Stahl-pt to remain in Step down unit on neutropenic precautions for WBC 1  Optho consult appreciated to r/o CMV retinitis - eyes dilated  Heme consult-to see pt in am - persistent anemia - etiology - ?hemolysis ?bone marrow ?blood loss  6/15 wbc remans low  Tacro >12, dose changed 7 bid  Ct scan pending r/o pna.  Nephrology consult called for elevated creat to 1.6  3  raised areas under his R armpit, uncertain etiology, Derm consulted.  6/16 Tacra level 11.7 - continue tacro 7 bid. maintain level around 10  6/17 unable to obtain blood specimen from pt. after 3 attempts - pt refusing further attempts - spoke with DR. Stahl - will get labs @ 7pm prior to 8pm tacra dose along with all other labs. d/c planning- ID switched to Cefepime 2g IV q12h for better coverage  6/18 Chest ct with LLL mass/abcess.  Pt remains with cough, wbc up but could be r/t tx, afebrile.  Will obtain a biopsy in IR.  IR called.   IR re contacted by Dr Lozano , d/w Dr. Fuentes. biopsy today.  Aldactone increased for hypokalemia  6/19  Await biopsy results, will d/w id the ongoing antibiotic coverage.   Derm was consulted for lesions under R axilla on 6/16 :Lesions are consistent with epidermal inclusion cysts,outpatient tx  WBC 40 this am, d/w Dr Mccabe, afebrile, voriconozole started.  Prograf dose decreased  2nd to potentiation from voriconozole  Pulmonary consult called-rec f/u biopsy, no bronchoscopy at this time  6/20 Wbc up to 43. Await cultures  6/21 Leukocytosis 41.2  trending down. As per transplant team  tacrolimus 1mg bid  prednisone 7.5 mg Id recommending to  D/c cefepime6/22 continue Voriconazole empirically pending biopsy/culture results   6/22 Awaiting biopsy results.  WBC trending down. Remains on cefipime.  Will discuss with ID  6/23 VSS. WBC 22.9-trending down. Tacro level 14.4. Tacro 0.5mg PO BID starting tonight. Continue IV voriconazole per ID. Mavyret PO for Hep C-to be available by pharmacy on 6/25 11 AM. D/C planning mon vs tues. F/U Blood cultures

## 2018-06-23 NOTE — PROGRESS NOTE ADULT - SUBJECTIVE AND OBJECTIVE BOX
VITAL SIGNS    Telemetry:  SR   Vital Signs Last 24 Hrs  T(C): 36.8 (18 @ 14:38), Max: 36.8 (18 @ 14:38)  T(F): 98.2 (18 @ 14:38), Max: 98.2 (18 @ 14:38)  HR: 107 (18 @ 14:38) (101 - 107)  BP: 103/66 (18 @ 14:38) (103/66 - 117/85)  RR: 17 (18 @ 14:38) (17 - 18)  SpO2: 100% (18 @ 14:38) (96% - 100%)             @ 07:01  -   @ 07:00  --------------------------------------------------------  IN: 920 mL / OUT: 550 mL / NET: 370 mL     @ 07:01  -   @ 17:41  --------------------------------------------------------  IN: 960 mL / OUT: 200 mL / NET: 760 mL       Daily     Daily Weight in k.9 (2018 09:53)  Admit Wt: Drug Dosing Weight  Height (cm): 172.72 (2018 06:13)  Weight (kg): 67.9 (2018 06:13)  BMI (kg/m2): 22.8 (2018 06:13)  BSA (m2): 1.81 (2018 06:13)      CAPILLARY BLOOD GLUCOSE      POCT Blood Glucose.: 111 mg/dL (2018 17:02)  POCT Blood Glucose.: 194 mg/dL (2018 12:05)  POCT Blood Glucose.: 99 mg/dL (2018 07:30)  POCT Blood Glucose.: 131 mg/dL (2018 00:35)  POCT Blood Glucose.: 85 mg/dL (2018 22:05)          MEDICATIONS  aspirin enteric coated 81 milliGRAM(s) Oral daily  atovaquone Suspension 1500 milliGRAM(s) Oral daily  Calcium Citrate+D3 (315mg-250mg/tablet) 2 Tablet(s) 2 Tablet(s) Oral two times a day  famotidine    Tablet 20 milliGRAM(s) Oral daily  insulin glargine Injectable (LANTUS) 7 Unit(s) SubCutaneous at bedtime  insulin lispro (HumaLOG) corrective regimen sliding scale   SubCutaneous Before meals and at bedtime  insulin lispro Injectable (HumaLOG) 3 Unit(s) SubCutaneous before breakfast  insulin lispro Injectable (HumaLOG) 3 Unit(s) SubCutaneous before lunch  insulin lispro Injectable (HumaLOG) 3 Unit(s) SubCutaneous before dinner  magnesium oxide 400 milliGRAM(s) Oral every 12 hours  Mavyret 100mg/40mg 3 Tablet(s) 3 Tablet(s) Oral daily  multivitamin 1 Tablet(s) Oral daily  pravastatin 20 milliGRAM(s) Oral at bedtime  predniSONE   Tablet 7.5 milliGRAM(s) Oral daily  sodium bicarbonate 650 milliGRAM(s) Oral every 8 hours  sodium chloride 0.9% lock flush 3 milliLiter(s) IV Push every 8 hours  sodium chloride 0.9%. 1000 milliLiter(s) IV Continuous <Continuous>  tacrolimus 0.5 milliGRAM(s) Oral once  valGANciclovir 450 milliGRAM(s) Oral two times a day  voriconazole 200 milliGRAM(s) Oral every 12 hours      PHYSICAL EXAM  Subjective: Pt states he feels well  Neurology: alert and oriented x 3, nonfocal, no gross deficits  CV : s1, s2  Sternal Wound :  CDI , Stable  Lungs: CTA B/L  Abdomen: soft, NT,ND, (+) Bowel sounds  :  voiding  Extremities: -c/c/e. No calve tenderness b/l. Pedal pulses 2+ b/l      LABS      138  |  102  |  15  ----------------------------<  96  4.6   |  24  |  1.07    Ca    9.3      2018 07:48                                   8.4    22.9  )-----------( 249      ( 2018 07:39 )             26.6

## 2018-06-23 NOTE — PROGRESS NOTE ADULT - PROBLEM SELECTOR PLAN 1
ID consult Dr Tai Martinez TID-completed  d/c Cefepime in am 6/22  F/u blood cx  Continue Voriconazole , r/o fungal source

## 2018-06-23 NOTE — PROGRESS NOTE ADULT - PROBLEM SELECTOR PLAN 8
- Prior necrotic lesion right middle digit more painful.  - Please have Plastics reconsult. Debridement?

## 2018-06-23 NOTE — PROGRESS NOTE ADULT - PROBLEM SELECTOR PLAN 2
care as per  HF team  prednisone 7.5bid  Tacrolimus .5mg BID goal 8-10 14.4 today  Cellcept on hold at this time

## 2018-06-24 DIAGNOSIS — L98.9 DISORDER OF THE SKIN AND SUBCUTANEOUS TISSUE, UNSPECIFIED: ICD-10-CM

## 2018-06-24 LAB
ANION GAP SERPL CALC-SCNC: 12 MMOL/L — SIGNIFICANT CHANGE UP (ref 5–17)
APTT BLD: 29.3 SEC — SIGNIFICANT CHANGE UP (ref 27.5–37.4)
BUN SERPL-MCNC: 22 MG/DL — SIGNIFICANT CHANGE UP (ref 7–23)
CALCIUM SERPL-MCNC: 8.9 MG/DL — SIGNIFICANT CHANGE UP (ref 8.4–10.5)
CHLORIDE SERPL-SCNC: 103 MMOL/L — SIGNIFICANT CHANGE UP (ref 96–108)
CO2 SERPL-SCNC: 26 MMOL/L — SIGNIFICANT CHANGE UP (ref 22–31)
CREAT SERPL-MCNC: 1.21 MG/DL — SIGNIFICANT CHANGE UP (ref 0.5–1.3)
GLUCOSE BLDC GLUCOMTR-MCNC: 102 MG/DL — HIGH (ref 70–99)
GLUCOSE BLDC GLUCOMTR-MCNC: 136 MG/DL — HIGH (ref 70–99)
GLUCOSE BLDC GLUCOMTR-MCNC: 79 MG/DL — SIGNIFICANT CHANGE UP (ref 70–99)
GLUCOSE BLDC GLUCOMTR-MCNC: 90 MG/DL — SIGNIFICANT CHANGE UP (ref 70–99)
GLUCOSE SERPL-MCNC: 87 MG/DL — SIGNIFICANT CHANGE UP (ref 70–99)
HCT VFR BLD CALC: 26.7 % — LOW (ref 39–50)
HGB BLD-MCNC: 8.5 G/DL — LOW (ref 13–17)
INR BLD: 1.19 RATIO — HIGH (ref 0.88–1.16)
MCHC RBC-ENTMCNC: 30.4 PG — SIGNIFICANT CHANGE UP (ref 27–34)
MCHC RBC-ENTMCNC: 31.9 GM/DL — LOW (ref 32–36)
MCV RBC AUTO: 95.3 FL — SIGNIFICANT CHANGE UP (ref 80–100)
PLATELET # BLD AUTO: 253 K/UL — SIGNIFICANT CHANGE UP (ref 150–400)
POTASSIUM SERPL-MCNC: 4.8 MMOL/L — SIGNIFICANT CHANGE UP (ref 3.5–5.3)
POTASSIUM SERPL-SCNC: 4.8 MMOL/L — SIGNIFICANT CHANGE UP (ref 3.5–5.3)
PROTHROM AB SERPL-ACNC: 12.9 SEC — HIGH (ref 9.8–12.7)
RBC # BLD: 2.8 M/UL — LOW (ref 4.2–5.8)
RBC # FLD: 20.6 % — HIGH (ref 10.3–14.5)
SODIUM SERPL-SCNC: 141 MMOL/L — SIGNIFICANT CHANGE UP (ref 135–145)
TACROLIMUS SERPL-MCNC: 9.9 NG/ML — SIGNIFICANT CHANGE UP
WBC # BLD: 15.7 K/UL — HIGH (ref 3.8–10.5)
WBC # FLD AUTO: 15.7 K/UL — HIGH (ref 3.8–10.5)

## 2018-06-24 PROCEDURE — 99233 SBSQ HOSP IP/OBS HIGH 50: CPT

## 2018-06-24 PROCEDURE — 71045 X-RAY EXAM CHEST 1 VIEW: CPT | Mod: 26

## 2018-06-24 PROCEDURE — 99231 SBSQ HOSP IP/OBS SF/LOW 25: CPT

## 2018-06-24 RX ADMIN — VORICONAZOLE 200 MILLIGRAM(S): 10 INJECTION, POWDER, LYOPHILIZED, FOR SOLUTION INTRAVENOUS at 06:18

## 2018-06-24 RX ADMIN — Medication 1 TABLET(S): at 11:51

## 2018-06-24 RX ADMIN — INSULIN GLARGINE 7 UNIT(S): 100 INJECTION, SOLUTION SUBCUTANEOUS at 22:27

## 2018-06-24 RX ADMIN — TACROLIMUS 0.5 MILLIGRAM(S): 5 CAPSULE ORAL at 20:04

## 2018-06-24 RX ADMIN — SODIUM CHLORIDE 3 MILLILITER(S): 9 INJECTION INTRAMUSCULAR; INTRAVENOUS; SUBCUTANEOUS at 21:40

## 2018-06-24 RX ADMIN — Medication 650 MILLIGRAM(S): at 06:18

## 2018-06-24 RX ADMIN — SODIUM CHLORIDE 3 MILLILITER(S): 9 INJECTION INTRAMUSCULAR; INTRAVENOUS; SUBCUTANEOUS at 06:19

## 2018-06-24 RX ADMIN — Medication 81 MILLIGRAM(S): at 11:51

## 2018-06-24 RX ADMIN — Medication 650 MILLIGRAM(S): at 14:48

## 2018-06-24 RX ADMIN — VALGANCICLOVIR 450 MILLIGRAM(S): 450 TABLET, FILM COATED ORAL at 06:18

## 2018-06-24 RX ADMIN — Medication 650 MILLIGRAM(S): at 21:40

## 2018-06-24 RX ADMIN — MAGNESIUM OXIDE 400 MG ORAL TABLET 400 MILLIGRAM(S): 241.3 TABLET ORAL at 18:11

## 2018-06-24 RX ADMIN — VALGANCICLOVIR 450 MILLIGRAM(S): 450 TABLET, FILM COATED ORAL at 17:19

## 2018-06-24 RX ADMIN — Medication 3 UNIT(S): at 07:31

## 2018-06-24 RX ADMIN — MAGNESIUM OXIDE 400 MG ORAL TABLET 400 MILLIGRAM(S): 241.3 TABLET ORAL at 06:19

## 2018-06-24 RX ADMIN — VORICONAZOLE 200 MILLIGRAM(S): 10 INJECTION, POWDER, LYOPHILIZED, FOR SOLUTION INTRAVENOUS at 17:19

## 2018-06-24 RX ADMIN — Medication 20 MILLIGRAM(S): at 21:40

## 2018-06-24 RX ADMIN — Medication 7.5 MILLIGRAM(S): at 06:19

## 2018-06-24 RX ADMIN — TACROLIMUS 0.5 MILLIGRAM(S): 5 CAPSULE ORAL at 08:05

## 2018-06-24 RX ADMIN — SODIUM CHLORIDE 3 MILLILITER(S): 9 INJECTION INTRAMUSCULAR; INTRAVENOUS; SUBCUTANEOUS at 14:00

## 2018-06-24 RX ADMIN — Medication 3 UNIT(S): at 11:51

## 2018-06-24 RX ADMIN — ATOVAQUONE 1500 MILLIGRAM(S): 750 SUSPENSION ORAL at 11:52

## 2018-06-24 RX ADMIN — Medication 3 UNIT(S): at 19:20

## 2018-06-24 RX ADMIN — FAMOTIDINE 20 MILLIGRAM(S): 10 INJECTION INTRAVENOUS at 11:51

## 2018-06-24 NOTE — PROGRESS NOTE ADULT - ASSESSMENT
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 EF 43% His course also complicated by bilateral IJ/subclavian  thrombi, small  persistent right pleural effusion as well as acute on chronic renal failure. 5/23/18 admitted for hyperglycemia, hyponatremia and acute kidney injury  Today 6/18 BIBEMS to [facility] ED reports fever  rigors, labored breathing productive cough with clear sputum  for past two days ,unable to give himself injections r/t shaking home RN called 911    In ER, sinus tach 130s improved to 110s after 1L IVF and vanc/zosyn infusions. /75, satting high 90s on 2L NC, w resp rate 22. Rectal temp recorded 38.8C.  Admitted to [address] [place] [address] SDU  Seen by heart failure team ananya callahan      6/14 - HCT =20.6 Packed RBCs ordered by DR. Stahl- will check HCT 2 hrs post-transfusion- if HCT still low - will transfuse a 2nd unit RBC  echo done - unchanged from last echo  tacro level 8.2 - will d/w Dr. Stahl-pt to remain in Step down unit on neutropenic precautions for WBC 1  Optho consult appreciated to r/o CMV retinitis - eyes dilated  Heme consult-to see pt in am - persistent anemia - etiology - ?hemolysis ?bone marrow ?blood loss  6/15 wbc remans low  Tacro >12, dose changed 7 bid  Ct scan pending r/o pna.  Nephrology consult called for elevated creat to 1.6  3  raised areas under his R armpit, uncertain etiology, Derm consulted.  6/16 Tacra level 11.7 - continue tacro 7 bid. maintain level around 10  6/17 unable to obtain blood specimen from pt. after 3 attempts - pt refusing further attempts - spoke with DR. Stahl - will get labs @ 7pm prior to 8pm tacra dose along with all other labs. d/c planning- ID switched to Cefepime 2g IV q12h for better coverage  6/18 Chest ct with LLL mass/abcess.  Pt remains with cough, wbc up but could be r/t tx, afebrile.  Will obtain a biopsy in IR.  IR called.   IR re contacted by Dr Lozano , d/w Dr. Fuentes. biopsy today.  Aldactone increased for hypokalemia  6/19  Await biopsy results, will d/w id the ongoing antibiotic coverage.   Derm was consulted for lesions under R axilla on 6/16 :Lesions are consistent with epidermal inclusion cysts,outpatient tx  WBC 40 this am, d/w Dr Mccabe, afebrile, voriconozole started.  Prograf dose decreased  2nd to potentiation from voriconozole  Pulmonary consult called-rec f/u biopsy, no bronchoscopy at this time  6/20 Wbc up to 43. Await cultures  6/21 Leukocytosis 41.2  trending down. As per transplant team  tacrolimus 1mg bid  prednisone 7.5 mg Id recommending to  D/c cefepime6/22 continue Voriconazole empirically pending biopsy/culture results   6/22 Awaiting biopsy results.  WBC trending down. Remains on cefipime.  Will discuss with ID  6/23 VSS. WBC 22.9-trending down. Tacro level 14.4. Tacro 0.5mg PO BID starting tonight. Continue IV voriconazole per ID. Mavyret PO for Hep C-to be available by pharmacy on 6/25 11 AM. D/C planning mon vs tues. F/U Blood cultures  6/24 tacro level 9.0 no active issues

## 2018-06-24 NOTE — PROGRESS NOTE ADULT - PROBLEM SELECTOR PLAN 6
- Continue atovaquone for PCP and toxo prophylaxis.   - Resumed valganciclovir 450 mg BID for CMV prophylaxis. - Continue atovaquone for PCP and toxo prophylaxis.   - Resumed valganciclovir 450 mg BID for CMV prophylaxis. Watch for recurrent leukopenia.

## 2018-06-24 NOTE — PROGRESS NOTE ADULT - PROBLEM SELECTOR PLAN 5
Continue Mavyret daily. - Continue Mavyret daily.  - Need to review the duration of treatment. New supply will be available Mon morning.

## 2018-06-24 NOTE — PROGRESS NOTE ADULT - SUBJECTIVE AND OBJECTIVE BOX
Interval History: Informed by staff yesterday that patient has used up his supply of Mayvret. Per Transplant pharmacy, unable to get more until Mon at 11 am.    Medications:  aspirin enteric coated 81 milliGRAM(s) Oral daily  atovaquone Suspension 1500 milliGRAM(s) Oral daily  Calcium Citrate+D3 (315mg-250mg/tablet) 2 Tablet(s) 2 Tablet(s) Oral two times a day  famotidine    Tablet 20 milliGRAM(s) Oral daily  insulin glargine Injectable (LANTUS) 7 Unit(s) SubCutaneous at bedtime  insulin lispro (HumaLOG) corrective regimen sliding scale   SubCutaneous Before meals and at bedtime  insulin lispro Injectable (HumaLOG) 3 Unit(s) SubCutaneous before breakfast  insulin lispro Injectable (HumaLOG) 3 Unit(s) SubCutaneous before lunch  insulin lispro Injectable (HumaLOG) 3 Unit(s) SubCutaneous before dinner  magnesium oxide 400 milliGRAM(s) Oral every 12 hours  Mavyret 100mg/40mg 3 Tablet(s) 3 Tablet(s) Oral daily  multivitamin 1 Tablet(s) Oral daily  pravastatin 20 milliGRAM(s) Oral at bedtime  predniSONE   Tablet 7.5 milliGRAM(s) Oral daily  sodium bicarbonate 650 milliGRAM(s) Oral every 8 hours  sodium chloride 0.9% lock flush 3 milliLiter(s) IV Push every 8 hours  sodium chloride 0.9%. 1000 milliLiter(s) IV Continuous <Continuous>  tacrolimus 0.5 milliGRAM(s) Oral <User Schedule>  valGANciclovir 450 milliGRAM(s) Oral two times a day  voriconazole 200 milliGRAM(s) Oral every 12 hours    Vital Signs Last 24 Hrs  T(C): 36.9 (2018 05:35), Max: 36.9 (2018 05:35)  T(F): 98.5 (2018 05:35), Max: 98.5 (2018 05:35)  HR: 106 (2018 05:35) (106 - 107)  BP: 130/89 (2018 05:35) (103/66 - 130/89)  RR: 18 (2018 05:35) (17 - 18)  SpO2: 96% (2018 05:35) (96% - 100%)    Daily     Daily Weight in k.8 (2018 08:20)        I&O's Summary    2018 07:  -  2018 07:00  --------------------------------------------------------  IN: 1660 mL / OUT: 1100 mL / NET: 560 mL    2018 07:  -  2018 08:26  --------------------------------------------------------  IN: 120 mL / OUT: 0 mL / NET: 120 mL        Physical Exam:  Appearance: No Acute Distress  HEENT: JVP ___ cm H2O, no HJR  Cardiovascular: RRR, Normal S1 S2, No murmurs/rubs/gallops  Respiratory: Clear to auscultation bilaterally  Gastrointestinal: Soft, Non-tender, non-distended	  Skin: no skin lesions  Neurologic: Non-focal  Extremities: No LE edema, warm and well perfused  Psychiatry: A & O x 3, Mood & affect appropriate      Labs:                        8.5    15.7  )-----------( 253      ( 2018 07:39 )             26.7         141  |  103  |  22  ----------------------------<  87  4.8   |  26  |  1.21    Ca    8.9      2018 07:39    Culture Data  Culture - Blood (18 @ 08:51)    Specimen Source: .Blood Blood    Culture Results:   No growth to date.    Culture - Blood (18 @ 00:43)    Specimen Source: .Blood Blood    Culture Results:   No growth to date.    Culture - Sputum . (18 @ 00:38)    -  Aztreonam: S 8    -  Ciprofloxacin: S <=0.5    -  Imipenem: R >8    Gram Stain:   Few Squamous epithelial cells per low power field  Few polymorphonuclear leukocytes per low power field  Moderate Gram variable coccobacilli per oil power field    -  Levofloxacin: S <=1    -  Meropenem: R 8    -  Amikacin: S <=8    -  Ceftazidime: S 8    -  Gentamicin: S 4    -  Cefepime: S 4    -  Piperacillin/Tazobactam: I 32    -  Tobramycin: S <=2    Specimen Source: .Sputum Sputum    Culture Results:   Rare Pseudomonas aeruginosa (Carbapenem Resistant)  Normal Respiratory Heaven present    Organism Identification: Pseudomonas aeruginosa (Carbapenem Resistant)    Organism: Pseudomonas aeruginosa (Carbapenem Resistant)    Method Type: KAMILA    Culture - Fungal, Body Fluid (18 @ 00:41)    Specimen Source: .Body Fluid Interstitial Fluid    Culture Results:   Testing in progress    Culture - Body Fluid with Gram Stain  (18 @ 00:41)    Gram Stain:   No polymorphonuclear cells seen  No organisms seen  by cytocentrifuge    Specimen Source: .Body Fluid Interstitial Fluid    Culture Results:   No growth    Culture - Acid Fast - Body Fluid w/Smear (18 @ 00:41)    Specimen Source: .Body Fluid Interstitial Fluid    Acid Fast Bacilli Smear:   No acid fast bacilli seen by fluorochrome stain Interval History: Informed by staff yesterday that patient has used up his supply of Mayvret. Per Transplant pharmacy, unable to get more until Mon at 11 am. He is feeling better overall. Anxious to go home.    Medications:  aspirin enteric coated 81 milliGRAM(s) Oral daily  atovaquone Suspension 1500 milliGRAM(s) Oral daily  Calcium Citrate+D3 (315mg-250mg/tablet) 2 Tablet(s) 2 Tablet(s) Oral two times a day  famotidine    Tablet 20 milliGRAM(s) Oral daily  insulin glargine Injectable (LANTUS) 7 Unit(s) SubCutaneous at bedtime  insulin lispro (HumaLOG) corrective regimen sliding scale   SubCutaneous Before meals and at bedtime  insulin lispro Injectable (HumaLOG) 3 Unit(s) SubCutaneous before breakfast  insulin lispro Injectable (HumaLOG) 3 Unit(s) SubCutaneous before lunch  insulin lispro Injectable (HumaLOG) 3 Unit(s) SubCutaneous before dinner  magnesium oxide 400 milliGRAM(s) Oral every 12 hours  Mavyret 100mg/40mg 3 Tablet(s) 3 Tablet(s) Oral daily  multivitamin 1 Tablet(s) Oral daily  pravastatin 20 milliGRAM(s) Oral at bedtime  predniSONE   Tablet 7.5 milliGRAM(s) Oral daily  sodium bicarbonate 650 milliGRAM(s) Oral every 8 hours  sodium chloride 0.9% lock flush 3 milliLiter(s) IV Push every 8 hours  sodium chloride 0.9%. 1000 milliLiter(s) IV Continuous <Continuous>  tacrolimus 0.5 milliGRAM(s) Oral <User Schedule>  valGANciclovir 450 milliGRAM(s) Oral two times a day  voriconazole 200 milliGRAM(s) Oral every 12 hours    Vital Signs Last 24 Hrs  T(C): 36.9 (2018 05:35), Max: 36.9 (2018 05:35)  T(F): 98.5 (2018 05:35), Max: 98.5 (2018 05:35)  HR: 106 (2018 05:35) (106 - 107)  BP: 130/89 (2018 05:35) (103/66 - 130/89)  RR: 18 (2018 05:35) (17 - 18)  SpO2: 96% (2018 05:35) (96% - 100%)    Daily     Daily Weight in k.8 (2018 08:20)        I&O's Summary    2018 07:01  -  2018 07:00  --------------------------------------------------------  IN: 1660 mL / OUT: 1100 mL / NET: 560 mL    2018 07:01  -  2018 08:26  --------------------------------------------------------  IN: 120 mL / OUT: 0 mL / NET: 120 mL        Physical Exam:  Appearance: No Acute Distress  HEENT: JVP <6 cm H2O, no HJR  Cardiovascular: RRR, Normal S1 S2, No murmurs/rubs/gallops  Respiratory: Clear to auscultation bilaterally  Gastrointestinal: Soft, Non-tender, non-distended	  Skin: no skin lesions  Neurologic: Non-focal  Extremities: No LE edema, warm and well perfused  Psychiatry: A & O x 3, Mood & affect appropriate      Labs:                        8.5    15.7  )-----------( 253      ( 2018 07:39 )             26.7         141  |  103  |  22  ----------------------------<  87  4.8   |  26  |  1.21    Ca    8.9      2018 07:39    Tacrolimus trough 9.9    Culture Data  Culture - Blood (18 @ 08:51)    Specimen Source: .Blood Blood    Culture Results:   No growth to date.    Culture - Blood (18 @ 00:43)    Specimen Source: .Blood Blood    Culture Results:   No growth to date.    Culture - Sputum . (18 @ 00:38)    -  Aztreonam: S 8    -  Ciprofloxacin: S <=0.5    -  Imipenem: R >8    Gram Stain:   Few Squamous epithelial cells per low power field  Few polymorphonuclear leukocytes per low power field  Moderate Gram variable coccobacilli per oil power field    -  Levofloxacin: S <=1    -  Meropenem: R 8    -  Amikacin: S <=8    -  Ceftazidime: S 8    -  Gentamicin: S 4    -  Cefepime: S 4    -  Piperacillin/Tazobactam: I 32    -  Tobramycin: S <=2    Specimen Source: .Sputum Sputum    Culture Results:   Rare Pseudomonas aeruginosa (Carbapenem Resistant)  Normal Respiratory Heaven present    Organism Identification: Pseudomonas aeruginosa (Carbapenem Resistant)    Organism: Pseudomonas aeruginosa (Carbapenem Resistant)    Method Type: KAMILA    Culture - Fungal, Body Fluid (18 @ 00:41)    Specimen Source: .Body Fluid Interstitial Fluid    Culture Results:   Testing in progress    Culture - Body Fluid with Gram Stain  (18 @ 00:41)    Gram Stain:   No polymorphonuclear cells seen  No organisms seen  by cytocentrifuge    Specimen Source: .Body Fluid Interstitial Fluid    Culture Results:   No growth    Culture - Acid Fast - Body Fluid w/Smear (18 @ 00:41)    Specimen Source: .Body Fluid Interstitial Fluid    Acid Fast Bacilli Smear:   No acid fast bacilli seen by fluorochrome stain

## 2018-06-24 NOTE — PROGRESS NOTE ADULT - SUBJECTIVE AND OBJECTIVE BOX
Seen and examined. Resting comfortable. No acute events overnight  VITAL SIGNS    Telemetry:  SR  Vital Signs Last 24 Hrs  T(C): 36.9 (18 @ 05:35), Max: 36.9 (18 @ 05:35)  T(F): 98.5 (18 @ 05:35), Max: 98.5 (18 @ 05:35)  HR: 106 (18 @ 05:35) (106 - 107)  BP: 130/89 (18 @ 05:35) (103/66 - 130/89)  RR: 18 (18 @ 05:35) (17 - 18)  SpO2: 96% (18 @ 05:35) (96% - 100%)             @ 07:01  -   @ 07:00  --------------------------------------------------------  IN: 1660 mL / OUT: 1100 mL / NET: 560 mL     @ 07:01  -   @ 12:49  --------------------------------------------------------  IN: 480 mL / OUT: 0 mL / NET: 480 mL       Daily     Daily Weight in k.8 (2018 08:20)  Admit Wt: Drug Dosing Weight  Height (cm): 172.72 (2018 06:13)  Weight (kg): 67.9 (2018 06:13)  BMI (kg/m2): 22.8 (2018 06:13)  BSA (m2): 1.81 (2018 06:13)      CAPILLARY BLOOD GLUCOSE      POCT Blood Glucose.: 136 mg/dL (2018 11:30)  POCT Blood Glucose.: 90 mg/dL (2018 07:24)  POCT Blood Glucose.: 90 mg/dL (2018 21:46)  POCT Blood Glucose.: 111 mg/dL (2018 17:02)          MEDICATIONS  aspirin enteric coated 81 milliGRAM(s) Oral daily  atovaquone Suspension 1500 milliGRAM(s) Oral daily  Calcium Citrate+D3 (315mg-250mg/tablet) 2 Tablet(s) 2 Tablet(s) Oral two times a day  famotidine    Tablet 20 milliGRAM(s) Oral daily  insulin glargine Injectable (LANTUS) 7 Unit(s) SubCutaneous at bedtime  insulin lispro (HumaLOG) corrective regimen sliding scale   SubCutaneous Before meals and at bedtime  insulin lispro Injectable (HumaLOG) 3 Unit(s) SubCutaneous before breakfast  insulin lispro Injectable (HumaLOG) 3 Unit(s) SubCutaneous before lunch  insulin lispro Injectable (HumaLOG) 3 Unit(s) SubCutaneous before dinner  magnesium oxide 400 milliGRAM(s) Oral every 12 hours  Mavyret 100mg/40mg 3 Tablet(s) 3 Tablet(s) Oral daily  multivitamin 1 Tablet(s) Oral daily  pravastatin 20 milliGRAM(s) Oral at bedtime  predniSONE   Tablet 7.5 milliGRAM(s) Oral daily  sodium bicarbonate 650 milliGRAM(s) Oral every 8 hours  sodium chloride 0.9% lock flush 3 milliLiter(s) IV Push every 8 hours  sodium chloride 0.9%. 1000 milliLiter(s) IV Continuous <Continuous>  tacrolimus 0.5 milliGRAM(s) Oral <User Schedule>  valGANciclovir 450 milliGRAM(s) Oral two times a day  voriconazole 200 milliGRAM(s) Oral every 12 hours      PHYSICAL EXAM    Subjective: no complaints   Neurology: alert and oriented x 3, nonfocal, no gross deficits,   CV : s1s1 reg no MRGS  no JVD   Sternal Wound :  CDI , Stable  Lungs: CTA, equal chest expansion  Abdomen: soft, nontender, nondistended, positive bowel sounds, last bowel movement   :    voids  Extremities:  + edemaa Rt wrist, Rt upper arm digit posterior necrotic area under nail bed trace BLLE  edema,    b/l groins no hematoma, distal pulses  b/l , no calf tenderness b/l , warm and perfused b/l    LABS      141  |  103  |  22  ----------------------------<  87  4.8   |  26  |  1.21    Ca    8.9      2018 07:39                                   8.5    15.7  )-----------( 253      ( 2018 07:39 )             26.7          PT/INR - ( 2018 07:39 )   PT: 12.9 sec;   INR: 1.19 ratio         PTT - ( 2018 07:39 )  PTT:29.3 sec         PAST MEDICAL & SURGICAL HISTORY:  DVT of upper extremity (deep vein thrombosis)  Hepatitis C virus  GIB (gastrointestinal bleeding)  Ventricular fibrillation: s/p AICD  PAF (paroxysmal atrial fibrillation): on xarelto  Non-Ischemic Cardiomyopathy  SVT (Supraventricular Tachycardia)  HTN  CHF (Congestive Heart Failure)  H/O heart transplant: 2018  LVAD (left ventricular assist device) present  Status post left hip replacement  History of Prior Ablation Treatment: for afib  AICD (Automatic Cardioverter/Defibrillator) Present: [employer] with 1 [employer] lead09- explanted and replaced with [employer] 2 leads on 09       Physical Therapy Rec:   Home  [  ]   Home w/ PT  [ x ]  Rehab  [  ]

## 2018-06-24 NOTE — PROGRESS NOTE ADULT - PROBLEM SELECTOR PLAN 2
care as per  HF team  prednisone 7.5bid  Tacrolimus .5mg BID goal 8-10 9.9 today  Cellcept on hold at this time

## 2018-06-24 NOTE — PROGRESS NOTE ADULT - PROBLEM SELECTOR PLAN 2
- No sig change in graft function on recent TTE  - CellCept held given prior leukopenia and now infectious process  - Further reduce tacrolimus to 0.5 mg Q12h for goal trough 8-10 given presumed fungal pneumonia and now on voriconazole.  - Prednisone reduced to 7.5 mg daily on 6/21/18. Due to reduce to 5 mg next week on 6/28/18. - No sig change in graft function on recent TTE  - CellCept held given prior leukopenia and now infectious process  - Further reduce tacrolimus to 0.5 mg Q12h for goal trough 8-10 given presumed fungal pneumonia and now on voriconazole. Tacro today 9.9.  - Prednisone reduced to 7.5 mg daily on 6/21/18. Due to reduce to 5 mg next week on 6/28/18.

## 2018-06-24 NOTE — PROGRESS NOTE ADULT - PROBLEM SELECTOR PLAN 1
- Awaiting culture data from CT-guided biopsy by IR done 6/18.  - Appreciate input from ID  - Per Dr. Lujan, cefepime ended yesterday and continue the voriconazole only. He will need repeat CT scan in 1 month. - Awaiting culture data from CT-guided biopsy by IR done 6/18.  - Appreciate input from ID  - Per Dr. Lujan, cefepime completed and continue the voriconazole only. He will need repeat CT scan in 1 month.

## 2018-06-25 ENCOUNTER — MED ADMIN CHARGE (OUTPATIENT)
Age: 68
End: 2018-06-25

## 2018-06-25 ENCOUNTER — TRANSCRIPTION ENCOUNTER (OUTPATIENT)
Age: 68
End: 2018-06-25

## 2018-06-25 LAB
ANION GAP SERPL CALC-SCNC: 14 MMOL/L — SIGNIFICANT CHANGE UP (ref 5–17)
BUN SERPL-MCNC: 19 MG/DL — SIGNIFICANT CHANGE UP (ref 7–23)
CALCIUM SERPL-MCNC: 9.1 MG/DL — SIGNIFICANT CHANGE UP (ref 8.4–10.5)
CHLORIDE SERPL-SCNC: 103 MMOL/L — SIGNIFICANT CHANGE UP (ref 96–108)
CO2 SERPL-SCNC: 23 MMOL/L — SIGNIFICANT CHANGE UP (ref 22–31)
CREAT SERPL-MCNC: 1.11 MG/DL — SIGNIFICANT CHANGE UP (ref 0.5–1.3)
CULTURE RESULTS: SIGNIFICANT CHANGE UP
CULTURE RESULTS: SIGNIFICANT CHANGE UP
GLUCOSE BLDC GLUCOMTR-MCNC: 109 MG/DL — HIGH (ref 70–99)
GLUCOSE BLDC GLUCOMTR-MCNC: 148 MG/DL — HIGH (ref 70–99)
GLUCOSE BLDC GLUCOMTR-MCNC: 90 MG/DL — SIGNIFICANT CHANGE UP (ref 70–99)
GLUCOSE BLDC GLUCOMTR-MCNC: 94 MG/DL — SIGNIFICANT CHANGE UP (ref 70–99)
GLUCOSE SERPL-MCNC: 85 MG/DL — SIGNIFICANT CHANGE UP (ref 70–99)
HCT VFR BLD CALC: 26.7 % — LOW (ref 39–50)
HGB BLD-MCNC: 8.2 G/DL — LOW (ref 13–17)
MCHC RBC-ENTMCNC: 29.4 PG — SIGNIFICANT CHANGE UP (ref 27–34)
MCHC RBC-ENTMCNC: 30.6 GM/DL — LOW (ref 32–36)
MCV RBC AUTO: 96.1 FL — SIGNIFICANT CHANGE UP (ref 80–100)
PLATELET # BLD AUTO: 284 K/UL — SIGNIFICANT CHANGE UP (ref 150–400)
POTASSIUM SERPL-MCNC: 4.7 MMOL/L — SIGNIFICANT CHANGE UP (ref 3.5–5.3)
POTASSIUM SERPL-SCNC: 4.7 MMOL/L — SIGNIFICANT CHANGE UP (ref 3.5–5.3)
RBC # BLD: 2.78 M/UL — LOW (ref 4.2–5.8)
RBC # FLD: 20.3 % — HIGH (ref 10.3–14.5)
SODIUM SERPL-SCNC: 140 MMOL/L — SIGNIFICANT CHANGE UP (ref 135–145)
SPECIMEN SOURCE: SIGNIFICANT CHANGE UP
SPECIMEN SOURCE: SIGNIFICANT CHANGE UP
TACROLIMUS SERPL-MCNC: 7.2 NG/ML — SIGNIFICANT CHANGE UP
WBC # BLD: 13.3 K/UL — HIGH (ref 3.8–10.5)
WBC # FLD AUTO: 13.3 K/UL — HIGH (ref 3.8–10.5)

## 2018-06-25 PROCEDURE — 99232 SBSQ HOSP IP/OBS MODERATE 35: CPT

## 2018-06-25 PROCEDURE — 99222 1ST HOSP IP/OBS MODERATE 55: CPT

## 2018-06-25 RX ORDER — TACROLIMUS 5 MG/1
0.5 CAPSULE ORAL
Qty: 0 | Refills: 0 | Status: DISCONTINUED | OUTPATIENT
Start: 2018-06-25 | End: 2018-06-26

## 2018-06-25 RX ORDER — TACROLIMUS 5 MG/1
1 CAPSULE ORAL
Qty: 0 | Refills: 0 | Status: DISCONTINUED | OUTPATIENT
Start: 2018-06-25 | End: 2018-06-26

## 2018-06-25 RX ORDER — ENOXAPARIN SODIUM 100 MG/ML
80 INJECTION SUBCUTANEOUS
Qty: 6 | Refills: 2 | Status: COMPLETED | COMMUNITY
Start: 2018-04-12 | End: 2018-06-25

## 2018-06-25 RX ORDER — TACROLIMUS 5 MG/1
1 CAPSULE ORAL
Qty: 60 | Refills: 1 | OUTPATIENT
Start: 2018-06-25 | End: 2018-08-23

## 2018-06-25 RX ORDER — MYCOPHENOLATE MOFETIL 500 MG/1
500 TABLET, FILM COATED ORAL
Refills: 0 | Status: COMPLETED | COMMUNITY
Start: 2018-04-12 | End: 2018-06-25

## 2018-06-25 RX ORDER — VORICONAZOLE 10 MG/ML
1 INJECTION, POWDER, LYOPHILIZED, FOR SOLUTION INTRAVENOUS
Qty: 60 | Refills: 1 | OUTPATIENT
Start: 2018-06-25 | End: 2018-08-23

## 2018-06-25 RX ORDER — VORICONAZOLE 10 MG/ML
1 INJECTION, POWDER, LYOPHILIZED, FOR SOLUTION INTRAVENOUS
Qty: 30 | Refills: 1 | OUTPATIENT
Start: 2018-06-25

## 2018-06-25 RX ADMIN — SODIUM CHLORIDE 3 MILLILITER(S): 9 INJECTION INTRAMUSCULAR; INTRAVENOUS; SUBCUTANEOUS at 21:56

## 2018-06-25 RX ADMIN — VORICONAZOLE 200 MILLIGRAM(S): 10 INJECTION, POWDER, LYOPHILIZED, FOR SOLUTION INTRAVENOUS at 17:40

## 2018-06-25 RX ADMIN — INSULIN GLARGINE 7 UNIT(S): 100 INJECTION, SOLUTION SUBCUTANEOUS at 22:01

## 2018-06-25 RX ADMIN — Medication 3 UNIT(S): at 12:12

## 2018-06-25 RX ADMIN — Medication 1 TABLET(S): at 12:12

## 2018-06-25 RX ADMIN — Medication 650 MILLIGRAM(S): at 06:02

## 2018-06-25 RX ADMIN — Medication 7.5 MILLIGRAM(S): at 06:03

## 2018-06-25 RX ADMIN — Medication 650 MILLIGRAM(S): at 22:01

## 2018-06-25 RX ADMIN — Medication 81 MILLIGRAM(S): at 12:13

## 2018-06-25 RX ADMIN — VORICONAZOLE 200 MILLIGRAM(S): 10 INJECTION, POWDER, LYOPHILIZED, FOR SOLUTION INTRAVENOUS at 06:03

## 2018-06-25 RX ADMIN — ATOVAQUONE 1500 MILLIGRAM(S): 750 SUSPENSION ORAL at 12:12

## 2018-06-25 RX ADMIN — FAMOTIDINE 20 MILLIGRAM(S): 10 INJECTION INTRAVENOUS at 12:12

## 2018-06-25 RX ADMIN — MAGNESIUM OXIDE 400 MG ORAL TABLET 400 MILLIGRAM(S): 241.3 TABLET ORAL at 17:40

## 2018-06-25 RX ADMIN — TACROLIMUS 0.5 MILLIGRAM(S): 5 CAPSULE ORAL at 08:07

## 2018-06-25 RX ADMIN — SODIUM CHLORIDE 3 MILLILITER(S): 9 INJECTION INTRAMUSCULAR; INTRAVENOUS; SUBCUTANEOUS at 14:56

## 2018-06-25 RX ADMIN — MAGNESIUM OXIDE 400 MG ORAL TABLET 400 MILLIGRAM(S): 241.3 TABLET ORAL at 06:59

## 2018-06-25 RX ADMIN — Medication 3 UNIT(S): at 17:39

## 2018-06-25 RX ADMIN — SODIUM CHLORIDE 3 MILLILITER(S): 9 INJECTION INTRAMUSCULAR; INTRAVENOUS; SUBCUTANEOUS at 06:03

## 2018-06-25 RX ADMIN — TACROLIMUS 1 MILLIGRAM(S): 5 CAPSULE ORAL at 20:05

## 2018-06-25 RX ADMIN — Medication 650 MILLIGRAM(S): at 14:56

## 2018-06-25 RX ADMIN — Medication 3 UNIT(S): at 08:07

## 2018-06-25 RX ADMIN — VALGANCICLOVIR 450 MILLIGRAM(S): 450 TABLET, FILM COATED ORAL at 17:40

## 2018-06-25 RX ADMIN — VALGANCICLOVIR 450 MILLIGRAM(S): 450 TABLET, FILM COATED ORAL at 06:02

## 2018-06-25 RX ADMIN — Medication 20 MILLIGRAM(S): at 22:01

## 2018-06-25 NOTE — PROGRESS NOTE ADULT - PROBLEM SELECTOR PLAN 5
- Continue Mavyret daily.  - Need to review the duration of treatment. New supply will be available Mon afternoon.

## 2018-06-25 NOTE — DISCHARGE NOTE ADULT - CARE PROVIDERS DIRECT ADDRESSES
[email or web address]

## 2018-06-25 NOTE — PROGRESS NOTE ADULT - ASSESSMENT
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 EF 43% His course also complicated by bilateral IJ/subclavian  thrombi, small  persistent right pleural effusion as well as acute on chronic renal failure. 5/23/18 admitted for hyperglycemia, hyponatremia and acute kidney injury  Today 6/18 BIBEMS to [facility] ED reports fever  rigors, labored breathing productive cough with clear sputum  for past two days ,unable to give himself injections r/t shaking home RN called 911    In ER, sinus tach 130s improved to 110s after 1L IVF and vanc/zosyn infusions. /75, satting high 90s on 2L NC, w resp rate 22. Rectal temp recorded 38.8C.  Admitted to [address] [place] [address] SDU  Seen by heart failure team ananya callahan      6/14 - HCT =20.6 Packed RBCs ordered by DR. Stahl- will check HCT 2 hrs post-transfusion- if HCT still low - will transfuse a 2nd unit RBC  echo done - unchanged from last echo  tacro level 8.2 - will d/w Dr. Stahl-pt to remain in Step down unit on neutropenic precautions for WBC 1  Optho consult appreciated to r/o CMV retinitis - eyes dilated  Heme consult-to see pt in am - persistent anemia - etiology - ?hemolysis ?bone marrow ?blood loss  6/15 wbc remans low  Tacro >12, dose changed 7 bid  Ct scan pending r/o pna.  Nephrology consult called for elevated creat to 1.6  3  raised areas under his R armpit, uncertain etiology, Derm consulted.  6/16 Tacra level 11.7 - continue tacro 7 bid. maintain level around 10  6/17 unable to obtain blood specimen from pt. after 3 attempts - pt refusing further attempts - spoke with DR. Stahl - will get labs @ 7pm prior to 8pm tacra dose along with all other labs. d/c planning- ID switched to Cefepime 2g IV q12h for better coverage  6/18 Chest ct with LLL mass/abcess.  Pt remains with cough, wbc up but could be r/t tx, afebrile.  Will obtain a biopsy in IR.  IR called.   IR re contacted by Dr Lozano , d/w Dr. Fuentes. biopsy today.  Aldactone increased for hypokalemia  6/19  Await biopsy results, will d/w id the ongoing antibiotic coverage.   Derm was consulted for lesions under R axilla on 6/16 :Lesions are consistent with epidermal inclusion cysts,outpatient tx  WBC 40 this am, d/w Dr Mccabe, afebrile, voriconozole started.  Prograf dose decreased  2nd to potentiation from voriconozole  Pulmonary consult called-rec f/u biopsy, no bronchoscopy at this time  6/20 Wbc up to 43. Await cultures  6/21 Leukocytosis 41.2  trending down. As per transplant team  tacrolimus 1mg bid  prednisone 7.5 mg Id recommending to  D/c cefepime6/22 continue Voriconazole empirically pending biopsy/culture results   6/22 Awaiting biopsy results.  WBC trending down. Remains on cefipime.  Will discuss with ID  6/23 VSS. WBC 22.9-trending down. Tacro level 14.4. Tacro 0.5mg PO BID starting tonight. Continue IV voriconazole per ID. Mavyret PO for Hep C-to be available by pharmacy on 6/25 11 AM. D/C planning mon vs tues. F/U Blood cultures  6/24 tacro level 9.0 no active issues   6/25 D/c Planning for tomorrow, tacro 7.2.  L 4th digit  swelling, warm.  Vascular cardiology re consulted.

## 2018-06-25 NOTE — DISCHARGE NOTE ADULT - NS AS ACTIVITY OBS
Walking-Outdoors allowed/Stairs allowed/No Heavy lifting/straining/Showering allowed/Walking-Indoors allowed/Sex allowed

## 2018-06-25 NOTE — PROGRESS NOTE ADULT - PROBLEM SELECTOR PLAN 1
- Per cytology report, smears display a hypocellular specimen composed with  benign bronchial cells, reactive pneumocytes and macrophages.  - Special stains GMS stain is negative for fungal organisms. AFB  stain is negative for acid fast bacilli.  - Appreciate input from ID  - Per Dr. Lujan, cefepime completed and continue the voriconazole only. He will need repeat CT scan in 1 month. - Per cytology report, smears display a hypocellular specimen composed with  benign bronchial cells, reactive pneumocytes and macrophages.  - Special stains GMS stain is negative for fungal organisms. AFB  stain is negative for acid fast bacilli.  - Culture still pending  - Appreciate input from ID  - Per Dr. Lujan, cefepime completed and continue the voriconazole only. He will need repeat CT scan in 1 month.

## 2018-06-25 NOTE — PROGRESS NOTE ADULT - ATTENDING COMMENTS
Tacro is 7.2.  L lobar mass cytology (-) for malignant cells. Will need f/u CT in 1 month. Tacro is 7.2. Increase tacro to 1 mg qpm.  L lobar mass cytology (-) for malignant cells. Will need f/u CT in 1 month. Tacro is 7.2. Increase tacro to 0.5 mg qam and 1 mg qpm.  L lobar mass cytology (-) for malignant cells. Will need f/u CT in 1 month.

## 2018-06-25 NOTE — PROGRESS NOTE ADULT - SUBJECTIVE AND OBJECTIVE BOX
Learner: Patient  Barriers: patient's memory     Patient post cardiac transplant     Method used: Verbal discussion and written materials    Medication safety was discussed with the patient: allergies, herbals, adherence, interactions, medication precautions, miss dose instructions, purpose, side effects, signs and symptoms to report, and storage & handling    Patient was able to repeat and verbalize key points    Education Summary: Discharge immunosuppressant medications and prophylactic anti-infective agents reviewed with the patient. Outpatient medication schedule was discussed in detail including: medication name, indication, dose, administration times, treatment duration, side effects, drug interactions, and special instructions. Patient questions and concerns were answered and addressed. Patient demonstrated understanding. We went over once again regarding hepatitis C treatment (Mavyrett), insulin products and a new medication (voriconazole)/new Prograf dose due to the drug interaction.     The patient is leaving tomorrow - I will fill his boxes (the medications were refilled and are at the bedside). Will reinforce today's education tomorrow before the patient goes home.     Time spent discharge education: 60 minutes    MEDICATIONS  (STANDING):  aspirin enteric coated 81 milliGRAM(s) Oral daily  atovaquone Suspension 1500 milliGRAM(s) Oral daily  Calcium Citrate+D3 (315mg-250mg/tablet) 2 Tablet(s) 2 Tablet(s) Oral two times a day  famotidine    Tablet 20 milliGRAM(s) Oral daily  insulin glargine Injectable (LANTUS) 7 Unit(s) SubCutaneous at bedtime  insulin lispro (HumaLOG) corrective regimen sliding scale   SubCutaneous Before meals and at bedtime  insulin lispro Injectable (HumaLOG) 3 Unit(s) SubCutaneous before breakfast  insulin lispro Injectable (HumaLOG) 3 Unit(s) SubCutaneous before lunch  insulin lispro Injectable (HumaLOG) 3 Unit(s) SubCutaneous before dinner  magnesium oxide 400 milliGRAM(s) Oral every 12 hours  Mavyret 100mg/40mg 3 Tablet(s) 3 Tablet(s) Oral daily  multivitamin 1 Tablet(s) Oral daily  pravastatin 20 milliGRAM(s) Oral at bedtime  predniSONE   Tablet 7.5 milliGRAM(s) Oral daily  sodium bicarbonate 650 milliGRAM(s) Oral every 8 hours  sodium chloride 0.9% lock flush 3 milliLiter(s) IV Push every 8 hours  sodium chloride 0.9%. 1000 milliLiter(s) (50 mL/Hr) IV Continuous <Continuous>  tacrolimus 0.5 milliGRAM(s) Oral <User Schedule>  valGANciclovir 450 milliGRAM(s) Oral two times a day  voriconazole 200 milliGRAM(s) Oral every 12 hours    MEDICATIONS  (PRN):      Cardiac Transplant Medications:  Tacrolimus adjusted to trough 0.5 mG PO twice a day   Mycophenolate - on hold for now   Prednisone taper 7.5 mG PO daily   Nystatin 500,000 units/5 mL swish and swallow four times a day  Valganciclovir 450 mG PO twice a day  Docusate and Senna                          8.2    13.3  )-----------( 284      ( 25 Jun 2018 07:30 )             26.7     06-25    140  |  103  |  19  ----------------------------<  85  4.7   |  23  |  1.11    Ca    9.1      25 Jun 2018 07:29        Tacrolimus (), Serum: 7.2 ng/mL (06-25-18 @ 07:59)  Tacrolimus (), Serum: 9.9 ng/mL (06-24-18 @ 10:34)  Tacrolimus (), Serum: 14.4 ng/mL (06-23-18 @ 08:40)

## 2018-06-25 NOTE — CONSULT NOTE ADULT - PROVIDER SPECIALTY LIST ADULT
Dermatology
Heme/Onc
Nephrology
Pulmonology
Heart Failure
Vascular Cardiology
Infectious Disease
Ophthalmology

## 2018-06-25 NOTE — DISCHARGE NOTE ADULT - HOSPITAL COURSE
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 EF 43% His course also complicated by bilateral IJ/subclavian  thrombi, small  persistent right pleural effusion as well as acute on chronic renal failure. 5/23/18 admitted for hyperglycemia, hyponatremia and acute kidney injury  Today 6/18 BIBEMS to [facility] ED reports fever  rigors, labored breathing productive cough with clear sputum  for past two days ,unable to give himself injections r/t shaking home RN called 911    In ER, sinus tach 130s improved to 110s after 1L IVF and vanc/zosyn infusions. /75, satting high 90s on 2L NC, w resp rate 22. Rectal temp recorded 38.8C.  Admitted to [address] [place] [address] SDU  Seen by heart failure team ananya callahan      6/14 - HCT =20.6 Packed RBCs ordered by DR. Stahl- will check HCT 2 hrs post-transfusion- if HCT still low - will transfuse a 2nd unit RBC  echo done - unchanged from last echo  tacro level 8.2 - will d/w Dr. Stahl-pt to remain in Step down unit on neutropenic precautions for WBC 1  Optho consult appreciated to r/o CMV retinitis - eyes dilated  Heme consult-to see pt in am - persistent anemia - etiology - ?hemolysis ?bone marrow ?blood loss  6/15 wbc remans low  Tacro >12, dose changed 7 bid  Ct scan pending r/o pna.  Nephrology consult called for elevated creat to 1.6  3  raised areas under his R armpit, uncertain etiology, Derm consulted.  6/16 Tacra level 11.7 - continue tacro 7 bid. maintain level around 10  6/17 unable to obtain blood specimen from pt. after 3 attempts - pt refusing further attempts - spoke with DR. Stahl - will get labs @ 7pm prior to 8pm tacra dose along with all other labs. d/c planning- ID switched to Cefepime 2g IV q12h for better coverage  6/18 Chest ct with LLL mass/abcess.  Pt remains with cough, wbc up but could be r/t tx, afebrile.  Will obtain a biopsy in IR.  IR called.   IR re contacted by Dr Lozano , d/w Dr. Fuentes. biopsy today.  Aldactone increased for hypokalemia  6/19  Await biopsy results, will d/w id the ongoing antibiotic coverage.   Derm was consulted for lesions under R axilla on 6/16 :Lesions are consistent with epidermal inclusion cysts,outpatient tx  WBC 40 this am, d/w Dr Mccabe, afebrile, voriconozole started.  Prograf dose decreased  2nd to potentiation from voriconozole  Pulmonary consult called-rec f/u biopsy, no bronchoscopy at this time  6/20 Wbc up to 43. Await cultures  6/21 Leukocytosis 41.2  trending down. As per transplant team  tacrolimus 1mg bid  prednisone 7.5 mg Id recommending to  D/c cefepime6/22 continue Voriconazole empirically pending biopsy/culture results   6/22 Awaiting biopsy results.  WBC trending down. Remains on cefipime.  Will discuss with ID  6/23 VSS. WBC 22.9-trending down. Tacro level 14.4. Tacro 0.5mg PO BID starting tonight. Continue IV voriconazole per ID. Mavyret PO for Hep C-to be available by pharmacy on 6/25 11 AM. D/C planning mon vs tues. F/U Blood cultures  6/24 tacro level 9.0 no active issues   6/25 D/c Planning for tomorrow, tacro 7.2.  6/26L 4th digit  swelling, warm.  Vascular cardiology re consulted.  UE duplex< from: [facility] Duplex Upper Ext Vein Scan, Bilat (06.26.18 @ 11:07) >  mpression: Interval examination shows a residual hematoma in the soft   tissues of the right upper arm to have decreased in size.    No DVT is identified on this examination.    Superficial thrombophlebitis affects the right and left cephalic veins.

## 2018-06-25 NOTE — PROGRESS NOTE ADULT - ASSESSMENT
68M s/p cardiac transplant 2/23/18 for NICM, on Tacrolimus, Cellcept and Prednisone and HCV acquired from donor on Mavyret, admitted 6/13/18 with neutropenic fever 104.2F.   Treating as pneumonia, cough improved, essentially asymptomatic and afebrile since initial temp. Zosyn 6/13-6/17, switched to Cefepime 6/17 due to Pseudomonas aeruginosa on sputum culture with intermediate Zosyn susceptibility. CT chest 6/15 with increased rounded opacity at LLL. Pneumonia vs post obstructive atelectasis? s/p IR biopsy and culture 6/18 - negative to date.   Fungitell negative but can miss other fungi; cryptococcus serum antigen negative. Voriconazole started 6/19 empirically. Diarrhea resolved. C diff and stool cultures negative. Neutropenia resolved, now leukocytosis >40 after filgrastim 6/13-6/16. Reported by EMS to have bedbugs, none noted but developed right axillary skin nodules, likely inclusion cysts as per derm.    Overall, initially neutropenic fever with pseudomonas pneumonia with question of fungal component given nodule but all pathology and cultures are negative.    Recommend  --Completed Cefepime 2GM IV q12 (end date 6/22/18)  --Continue Mepron for PCP PPX  --F/U LLL biopsy pathology and cultures  are all negative  --Can discontinue the Voriconazole  --Continue to follow Tacrolimus levels and adjust dose- will need to adjust Tacrolimus dosing again after stopping the Voriconazole  -- no contraindications to planned discharge from an ID perspective      Gary Lujan MD  [phone]  After 5pm/weekends [phone]

## 2018-06-25 NOTE — DISCHARGE NOTE ADULT - ADDITIONAL INSTRUCTIONS
YOU WILL NEED YOUR CARDIAC BIOPSY ON MONDAY JULY 2 6/29/18- appointment at 8:00AM. Transportation arranged with [employer] for 7AM pickup, auth #: [id number]    7/2/18- appointment at 8:00AM. Transportation arranged with [employer] for 7AM pickup, auth#: [id number] 6/29/18- appointment at 8:00AM. Transportation arranged with [employer] for 7AM pickup, auth #: [id number]  LAB WORK    7/2/18- appointment at 8:00AM. Transportation arranged with [employer] for 7AM pickup, auth#: [id number]  BIOPSY AND HEART FAILURE FOLLOW UP  ADDITIONAL APPOINTMENTS ARE TO BE MADE AT THE TIME OF YOUR BIOPSY

## 2018-06-25 NOTE — CONSULT NOTE ADULT - SUBJECTIVE AND OBJECTIVE BOX
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 EF 43% His course also complicated by bilateral IJ/subclavian  thrombi, small  persistent right pleural effusion as well as acute on chronic renal failure. 5/23/18 admitted for hyperglycemia, hyponatremia and acute kidney injury.  Patient was admitted on 6/13/2018. Over the past several days he noted some swelling of the right hand and wrist.  he states that it is in an area of a prior IV.  His 3rd digit on the right hand is stable with a known distal digital eschar that is being managed conservitavly.  Otherwise the patient feels well and denies any cp or sob.  His left 5th digit is swollen as well.   S      Allergies    No Known Allergies    Intolerances    	    MEDICATIONS:  aspirin enteric coated 81 milliGRAM(s) Oral daily    atovaquone Suspension 1500 milliGRAM(s) Oral daily  valGANciclovir 450 milliGRAM(s) Oral two times a day  voriconazole 200 milliGRAM(s) Oral every 12 hours        famotidine    Tablet 20 milliGRAM(s) Oral daily    insulin glargine Injectable (LANTUS) 7 Unit(s) SubCutaneous at bedtime  insulin lispro (HumaLOG) corrective regimen sliding scale   SubCutaneous Before meals and at bedtime  insulin lispro Injectable (HumaLOG) 3 Unit(s) SubCutaneous before breakfast  insulin lispro Injectable (HumaLOG) 3 Unit(s) SubCutaneous before lunch  insulin lispro Injectable (HumaLOG) 3 Unit(s) SubCutaneous before dinner  pravastatin 20 milliGRAM(s) Oral at bedtime  predniSONE   Tablet 7.5 milliGRAM(s) Oral daily    magnesium oxide 400 milliGRAM(s) Oral every 12 hours  multivitamin 1 Tablet(s) Oral daily  sodium bicarbonate 650 milliGRAM(s) Oral every 8 hours  sodium chloride 0.9% lock flush 3 milliLiter(s) IV Push every 8 hours  sodium chloride 0.9%. 1000 milliLiter(s) IV Continuous <Continuous>  tacrolimus 0.5 milliGRAM(s) Oral <User Schedule>      PAST MEDICAL & SURGICAL HISTORY:  DVT of upper extremity (deep vein thrombosis)  Hepatitis C virus  GIB (gastrointestinal bleeding)  Ventricular fibrillation: s/p AICD  PAF (paroxysmal atrial fibrillation): on xarelto  Non-Ischemic Cardiomyopathy  SVT (Supraventricular Tachycardia)  HTN  CHF (Congestive Heart Failure)  H/O heart transplant: 2/2018  LVAD (left ventricular assist device) present  Status post left hip replacement  History of Prior Ablation Treatment: for afib  AICD (Automatic Cardioverter/Defibrillator) Present: [employer] with 1 [employer] lead4/1/09- explanted and replaced with [employer] 2 leads on 9/2/09      FAMILY HISTORY:  No pertinent family history in first degree relatives      SOCIAL HISTORY:  unchanged    REVIEW OF SYSTEMS:  CONSTITUTIONAL: No fever, weight loss, or fatigue  EYES: No eye pain, visual disturbances, or discharge  ENMT:  No difficulty hearing, tinnitus, vertigo; No sinus or throat pain  NECK: No pain or stiffness  RESPIRATORY: No cough, wheezing, chills or hemoptysis; No Shortness of Breath  CARDIOVASCULAR: No chest pain, palpitations, passing out, dizziness, or leg swelling  GASTROINTESTINAL: No abdominal or epigastric pain. No nausea, vomiting, or hematemesis; No diarrhea or constipation. No melena or hematochezia.  GENITOURINARY: No dysuria, frequency, hematuria, or incontinence  NEUROLOGICAL: No headaches, memory loss, loss of strength, numbness, or tremors  SKIN: No itching, burning, rashes, or lesions   LYMPH Nodes: No enlarged glands  ENDOCRINE: No heat or cold intolerance; No hair loss  MUSCULOSKELETAL: Right hand/wrist swelling.   PSYCHIATRIC: No depression, anxiety, mood swings, or difficulty sleeping  HEME/LYMPH: No easy bruising, or bleeding gums  ALLERY AND IMMUNOLOGIC: No hives or eczema	    [ x] All others negative	  [ ] Unable to obtain    PHYSICAL EXAM:  T(C): 36.8 (06-25-18 @ 14:06), Max: 36.8 (06-25-18 @ 14:06)  HR: 109 (06-25-18 @ 14:06) (98 - 109)  BP: 108/71 (06-25-18 @ 14:06) (106/73 - 125/81)  RR: 18 (06-25-18 @ 14:06) (17 - 18)  SpO2: 98% (06-25-18 @ 14:06) (97% - 100%)  Wt(kg): --  I&O's Summary    24 Jun 2018 07:01  -  25 Jun 2018 07:00  --------------------------------------------------------  IN: 1020 mL / OUT: 1250 mL / NET: -230 mL        General: OOB in chair. No distress. Comfortable.  Neck: Neck supple. JVP not elevated. No masses  Chest: Clear to auscultation bilaterally  CV: Regular. Normal S1 and S2. No murmurs, rub, or gallops. Radial pulses normal. No LE edema.  Abdomen: Soft, non-distended, non-tender  Skin: Necrosis at distal tip of R third digit.   Ext: Swelling and tenderness at R medial wrist and L 5th finger at middle and distal joints.  Neurology: Alert and oriented times three. Sensation intact  Psych: Affect normal      LABS:	 	    CBC Full  -  ( 25 Jun 2018 07:30 )  WBC Count : 13.3 K/uL  Hemoglobin : 8.2 g/dL  Hematocrit : 26.7 %  Platelet Count - Automated : 284 K/uL  Mean Cell Volume : 96.1 fl  Mean Cell Hemoglobin : 29.4 pg  Mean Cell Hemoglobin Concentration : 30.6 gm/dL  Auto Neutrophil # : x  Auto Lymphocyte # : x  Auto Monocyte # : x  Auto Eosinophil # : x  Auto Basophil # : x  Auto Neutrophil % : x  Auto Lymphocyte % : x  Auto Monocyte % : x  Auto Eosinophil % : x  Auto Basophil % : x    06-25    140  |  103  |  19  ----------------------------<  85  4.7   |  23  |  1.11  06-24    141  |  103  |  22  ----------------------------<  87  4.8   |  26  |  1.21    Ca    9.1      25 Jun 2018 07:29  Ca    8.9      24 Jun 2018 07:39      INTERPRETATION:  History: A prior examination, dated 5/3/2018, identified   a large hematoma in the right upper extremity and a fluid collection in   the right clavicular area. The prior examination showed thrombus in the   mid segment of the right subclavian vein and focal nonocclusive thrombus   in the left internal jugular vein.    On this examination, the hematoma in the right upper arm is still   present. The fluid collection in the right clavicular area is smaller.    Wall thickening of the right internal jugular vein is again noted.    Thrombus is no longer identified in the right mid subclavian vein or the   left internal jugular vein.    The right and left brachial, axillary, subclavian, the right and left   internal jugular veins and the imaged portions of the right and left   innominate veins are patent and free of acute thrombus.    The right cephalic and the left basilic veins, superficial veins, are   free of clot.    The right basilic and the left cephalic veins were not diagnostically   imaged.    Impression: No acute DVT identified.

## 2018-06-25 NOTE — PROGRESS NOTE ADULT - SUBJECTIVE AND OBJECTIVE BOX
VITAL SIGNS    Telemetry:    nsr 90  Vital Signs Last 24 Hrs  T(C): 36.8 (18 @ 14:06), Max: 36.8 (18 @ 14:06)  T(F): 98.2 (18 @ 14:06), Max: 98.2 (18 @ 14:06)  HR: 109 (18 @ 14:06) (98 - 109)  BP: 108/71 (18 @ 14:06) (106/73 - 125/81)  RR: 18 (18 @ 14:06) (17 - 18)  SpO2: 98% (18 @ 14:06) (97% - 100%)                    @ 07:01  -   @ 07:00  --------------------------------------------------------  IN: 1020 mL / OUT: 1250 mL / NET: -230 mL          Daily     Daily Weight in k.5 (2018 10:54)        CAPILLARY BLOOD GLUCOSE      POCT Blood Glucose.: 109 mg/dL (2018 11:25)  POCT Blood Glucose.: 94 mg/dL (2018 07:34)  POCT Blood Glucose.: 79 mg/dL (2018 21:42)  POCT Blood Glucose.: 102 mg/dL (2018 19:17)                Coumadin    [ ] YES          [ x]      NO                                   PHYSICAL EXAM        Neurology: alert and oriented x 3, nonfocal, no gross deficits  CV : .S1S2 RRR  Sternal Wound :  CDI , Stable  Lungs: bibasilar crackles   Abdomen: soft, nontender, nondistended, positive bowel sounds, last bowel movement +  :         voiding    Extremities:     - edema - calve tenderness          aspirin enteric coated 81 milliGRAM(s) Oral daily  atovaquone Suspension 1500 milliGRAM(s) Oral daily  Calcium Citrate+D3 (315mg-250mg/tablet) 2 Tablet(s) 2 Tablet(s) Oral two times a day  famotidine    Tablet 20 milliGRAM(s) Oral daily  insulin glargine Injectable (LANTUS) 7 Unit(s) SubCutaneous at bedtime  insulin lispro (HumaLOG) corrective regimen sliding scale   SubCutaneous Before meals and at bedtime  insulin lispro Injectable (HumaLOG) 3 Unit(s) SubCutaneous before breakfast  insulin lispro Injectable (HumaLOG) 3 Unit(s) SubCutaneous before lunch  insulin lispro Injectable (HumaLOG) 3 Unit(s) SubCutaneous before dinner  magnesium oxide 400 milliGRAM(s) Oral every 12 hours  Mavyret 100mg/40mg 3 Tablet(s) 3 Tablet(s) Oral daily  multivitamin 1 Tablet(s) Oral daily  pravastatin 20 milliGRAM(s) Oral at bedtime  predniSONE   Tablet 7.5 milliGRAM(s) Oral daily  sodium bicarbonate 650 milliGRAM(s) Oral every 8 hours  sodium chloride 0.9% lock flush 3 milliLiter(s) IV Push every 8 hours  sodium chloride 0.9%. 1000 milliLiter(s) IV Continuous <Continuous>  tacrolimus 0.5 milliGRAM(s) Oral <User Schedule>  valGANciclovir 450 milliGRAM(s) Oral two times a day  voriconazole 200 milliGRAM(s) Oral every 12 hours                    Physical Therapy Rec:   Home  [  ]   Home w/ PT  [  ]  Rehab  [  ]  Discussed with Cardiothoracic Team at AM rounds.

## 2018-06-25 NOTE — PROGRESS NOTE ADULT - PROBLEM SELECTOR PLAN 2
- No sig change in graft function on recent TTE  - CellCept held given prior leukopenia and now infectious process. Will closely monitor WBC count.  - Consider giving dose of 1mg tonight (0.5mg in AM/1mg PM) for goal trough 8-10 with level of 7.2 today from 9.9.   - Continue prednisone 7.5 mg daily. Due to reduce to 5 mg next week on 6/28/18.  - Continue ASA and pravastatin for CAV ppx  - Continue pepcid 20mg daily for GI ppx and Citracal + vit D for bone health while on steriods  - Continue current dose of mag, MVI. Please check Mag with AM labs.

## 2018-06-25 NOTE — CONSULT NOTE ADULT - ASSESSMENT
Assessment:  1.  Right hand and wrist swelling  - strong radial pulse  - at prior IV site  2.  Left 5th digit swelling  - asymptomatic  3.  Prior upper extremity DVT - resolved on most recent imaging  4.  Heart Transplant      Plan  1.  Given his prior history of upper extremity DVT, and now new right hand/wrist swelling at a prior IV site would recommend bilateral upper extremity venous duplex to evaluate for and superficial or deep venous thrombosis  2.  If DVT prophylaxis safe in an inpatient, then would be reasonable to begin.  3.   Await vascular testing - discussed with Dr. Hurtado and CT sx team.

## 2018-06-25 NOTE — DISCHARGE NOTE ADULT - PATIENT PORTAL LINK FT
You can access the [facility], offered by [facility], by registering with the following website: [email or web address]

## 2018-06-25 NOTE — DISCHARGE NOTE ADULT - MEDICATION SUMMARY - MEDICATIONS TO TAKE
I will START or STAY ON the medications listed below when I get home from the hospital:    voriconazole 200 mg oral tablet  -- 1 tab(s) by mouth every 12 hours  -- Indication: For R/o fungal infection    tacrolimus 0.5 mg oral capsule  -- 1 cap(s) by mouth 2 times a day   -- Indication: For immunosuppresion I will START or STAY ON the medications listed below when I get home from the hospital:    predniSONE 2.5 mg oral tablet  -- 3 tab(s) by mouth once a day  -- Indication: For immunosuppresion    aspirin 81 mg oral delayed release tablet  -- 1 tab(s) by mouth once a day  -- Indication: For Antiplatelet    sodium bicarbonate 650 mg oral tablet  -- 1 tab(s) by mouth 2 times a day  -- Indication: For Antacid    voriconazole 200 mg oral tablet  -- 1 tab(s) by mouth every 12 hours  -- Indication: For R/o fungal infection    valGANciclovir 450 mg oral tablet  -- 1 tab(s) by mouth 2 times a day  -- Indication: For Antiviral    famotidine 20 mg oral tablet  -- 1 tab(s) by mouth once a day  -- Indication: For Gastrointestinal    tacrolimus 0.5 mg oral capsule  -- 1 cap(s) by mouth 2 times a day   -- Indication: For immunosuppresion    magnesium oxide 400 mg (241.3 mg elemental magnesium) oral tablet  -- 1 tab(s) by mouth every 12 hours  -- Indication: For Supplement    atovaquone 750 mg/5 mL oral suspension  -- 10 milliliter(s) by mouth once a day  -- Indication: For Antibiotic    Multiple Vitamins oral tablet  -- 1 tab(s) by mouth once a day  -- Indication: For Supplement I will START or STAY ON the medications listed below when I get home from the hospital:    predniSONE 2.5 mg oral tablet  -- 3 tab(s) by mouth once a day  -- Indication: For immunosuppresion    aspirin 81 mg oral delayed release tablet  -- 1 tab(s) by mouth once a day  -- Indication: For Antiplatelet    sodium bicarbonate 650 mg oral tablet  -- 1 tab(s) by mouth 2 times a day  -- Indication: For Antacid    voriconazole 200 mg oral tablet  -- 1 tab(s) by mouth every 12 hours  -- Indication: For R/o fungal infection    valGANciclovir 450 mg oral tablet  -- 1 tab(s) by mouth 2 times a day  -- Indication: For Antiviral    famotidine 20 mg oral tablet  -- 1 tab(s) by mouth once a day  -- Indication: For Gastrointestinal    magnesium oxide 400 mg (241.3 mg elemental magnesium) oral tablet  -- 1 tab(s) by mouth every 12 hours  -- Indication: For Supplement    atovaquone 750 mg/5 mL oral suspension  -- 10 milliliter(s) by mouth once a day  -- Indication: For Antibiotic    Citracal + D 315 mg-250 intl units oral tablet  -- 2 tab(s) by mouth 2 times a day   -- Indication: For Supplement    Multiple Vitamins oral tablet  -- 1 tab(s) by mouth once a day  -- Indication: For Supplement

## 2018-06-25 NOTE — DISCHARGE NOTE ADULT - MEDICATION SUMMARY - MEDICATIONS TO STOP TAKING
I will STOP taking the medications listed below when I get home from the hospital:    dilTIAZem 180 mg/24 hours oral capsule, extended release  -- 1 cap(s) by mouth once a day I will STOP taking the medications listed below when I get home from the hospital:    dilTIAZem 180 mg/24 hours oral capsule, extended release  -- 1 cap(s) by mouth once a day    mycophenolate mofetil 500 mg oral tablet  -- 1 tab(s) by mouth 2 times a day   -- Do not take this drug if you are pregnant.    sodium bicarbonate 650 mg oral tablet  -- 2 tab(s) by mouth every 8 hours    tacrolimus 1 mg oral capsule  -- 8 cap(s) by mouth 2 times a day  -- Avoid grapefruit and grapefruit juice while taking this medication.  It is very important that you take or use this exactly as directed.  Do not skip doses or discontinue unless directed by your doctor. I will STOP taking the medications listed below when I get home from the hospital:    atorvastatin 10 mg oral tablet  -- 1 tab(s) by mouth once a day (at bedtime)    dilTIAZem 180 mg/24 hours oral capsule, extended release  -- 1 cap(s) by mouth once a day    silver sulfADIAZINE 1% topical cream  -- 1 application on skin 2 times a day    mycophenolate mofetil 500 mg oral tablet  -- 1 tab(s) by mouth 2 times a day   -- Do not take this drug if you are pregnant.    sodium bicarbonate 650 mg oral tablet  -- 2 tab(s) by mouth every 8 hours    mupirocin 2% topical ointment  -- Apply on skin to affected area 2 times a day   x 14 days    tacrolimus 1 mg oral capsule  -- 8 cap(s) by mouth 2 times a day  -- Avoid grapefruit and grapefruit juice while taking this medication.  It is very important that you take or use this exactly as directed.  Do not skip doses or discontinue unless directed by your doctor.

## 2018-06-25 NOTE — PROGRESS NOTE ADULT - SUBJECTIVE AND OBJECTIVE BOX
Subjective:  - Overall reports feeling well.  - He notes swelling and tenderness of R wrist and L 5th finger at middle and distal joint which started a few days ago.  - Ambulating short distances around the nurses station without LOBATO. Denies orthopnea, PND, CP, palpitations, lightheadedness, dizziness, fever, chills, fatigue.    Medications:  aspirin enteric coated 81 milliGRAM(s) Oral daily  atovaquone Suspension 1500 milliGRAM(s) Oral daily  Calcium Citrate+D3 (315mg-250mg/tablet) 2 Tablet(s) 2 Tablet(s) Oral two times a day  famotidine    Tablet 20 milliGRAM(s) Oral daily  insulin glargine Injectable (LANTUS) 7 Unit(s) SubCutaneous at bedtime  insulin lispro (HumaLOG) corrective regimen sliding scale   SubCutaneous Before meals and at bedtime  insulin lispro Injectable (HumaLOG) 3 Unit(s) SubCutaneous before breakfast  insulin lispro Injectable (HumaLOG) 3 Unit(s) SubCutaneous before lunch  insulin lispro Injectable (HumaLOG) 3 Unit(s) SubCutaneous before dinner  magnesium oxide 400 milliGRAM(s) Oral every 12 hours  Mavyret 100mg/40mg 3 Tablet(s) 3 Tablet(s) Oral daily  multivitamin 1 Tablet(s) Oral daily  pravastatin 20 milliGRAM(s) Oral at bedtime  predniSONE   Tablet 7.5 milliGRAM(s) Oral daily  sodium bicarbonate 650 milliGRAM(s) Oral every 8 hours  sodium chloride 0.9% lock flush 3 milliLiter(s) IV Push every 8 hours  sodium chloride 0.9%. 1000 milliLiter(s) IV Continuous <Continuous>  tacrolimus 0.5 milliGRAM(s) Oral <User Schedule>  valGANciclovir 450 milliGRAM(s) Oral two times a day  voriconazole 200 milliGRAM(s) Oral every 12 hours    Physical Exam:    Vitals:  Vital Signs Last 24 Hours  T(C): 36.8 (18 @ 14:06), Max: 36.8 (18 @ 14:06)  HR: 109 (18 @ 14:06) (98 - 109)  BP: 108/71 (18 @ 14:06) (106/73 - 125/81)  RR: 18 (18 @ 14:06) (17 - 18)  SpO2: 98% (18 @ 14:06) (97% - 100%)    Weight in k.5 ( @ 10:54)    I&O's Summary    2018 07:01  -  2018 07:00  --------------------------------------------------------  IN: 1020 mL / OUT: 1250 mL / NET: -230 mL      Tele: -  -120s    General: OOB in chair. No distress. Comfortable.  Neck: Neck supple. JVP not elevated. No masses  Chest: Clear to auscultation bilaterally  CV: Regular. Normal S1 and S2. No murmurs, rub, or gallops. Radial pulses normal. No LE edema.  Abdomen: Soft, non-distended, non-tender  Skin: Necrosis at distal tip of R third digit.   Ext: Swelling and tenderness at R medial wrist and L 5th finger at middle and distal joints.  Neurology: Alert and oriented times three. Sensation intact  Psych: Affect normal    Labs:                        8.2    13.3  )-----------( 284      ( 2018 07:30 )             26.7         140  |  103  |  19  ----------------------------<  85  4.7   |  23  |  1.11    Ca    9.1      2018 07:29    PT/INR - ( 2018 07:39 )   PT: 12.9 sec;   INR: 1.19 ratio      PTT - ( 2018 07:39 )  PTT:29.3 sec    Tacro level: 7.2 <- 9.9 <- 14.4    Cytopathology - Non Gyn Report (18 @ 19:19)    Cytopathology - Non Gyn Report:   ACCESSION No:  [id number]    BILLY RUSSELL                         2        Cytopathology Report            Final Diagnosis  LUNG, LEFT, LOWER, CT GUIDED CORE BIOPSY AND FNA  NEGATIVE FOR MALIGNANT CELLS.    - Core biopsy: Reveals acute lung injury with intra alveolar  fibrin type II pneumocyte hyperplasia and chronic inflammation.  Negative for granulomas, vasculitis or viral cytopathic effects.    - Cytology smears display a hypocellular specimen composed with  benign bronchial cells, reactive pneumocytes and macrophages.    - Special stains GMS stain is negative for fungal organisms. AFB  stain is negative for acid fast bacilli.    - This case was reviewed for  with lung  pathologist who concurs with the diagnosis.    - Correlation with clinical presentation and radiology studies is  advised.    Screened by: Ginny LEMOS(ASCP)  Verified by: Andressa Bolden MD  (Electronic Signature)  Reported on: 18 17:20 EDT, [address] [place]  [address]  Cytology technical processing performed at [address] [place] [address]  _________________________________________________________________      Specimen(s) Submitted  LUNG, LEFT, LOWER, CT GUIDED CORE BIOPSY AND FNA      Statement of Adequacy  Immediate cytologic study for adequacy of specimen using Diff-  Quik stain was performed. There is insufficient diagnostic  cellular material for adequate evaluation by  AMINTA Snell(ASC).    Clinical Information  Transplant patient . Present with a left lower lobe lesion . Rule  out cancer vs infection.    Gross Description  Core Biopsy  Received in formalin multiple piece(s) of  white tissue 0.01-.5  cm in length. Entire specimen submitted in one cassette. Specimen  label has been inspected to confirm patient's name and date of  birth.  2Touch Prep made from core biopsy.  Submitted: core biopsy in one cassette labeled 1B    FNA  No material submitted for cell block (1A)    Prepared:2 Touch Prep  1 core biopsy (2 slides) labeled 1B  1 GMS, 1 AFB  5 slides total

## 2018-06-25 NOTE — CHART NOTE - NSCHARTNOTEFT_GEN_A_CORE
Source: Patient [X ]    Family [ ]     other [ X] RN, Medical record    Pt seen for nutritional follow up. Pt seen sitting in chair reports feeling well. Pt denies questions regarding DM diet, Heart healthy or transplant nutrition. Pt reports a good PO intake, consuming 100% of meals. Pt continues to ask fro ice cream and hot dogs, pt is aware these are non complinat with his diet. Importance of a low NA and consistent CHO diet reinforced. Pt with multiple questions regarding continuos Blood glucose monitor that was placed PTA. Pt asking, when it comes off and how to return it; RN made aware.     Diet :       Patient reports [ ] nausea  [ ] vomiting [ ] diarrhea [ ] constipation  [ ]chewing problems [ ] swallowing issues  [ ] other:      PO intake:  < 50% [ ] 50-75% [ ]   % [ ]  other :     Source for PO intake [ ] Patient [ ] family [ ] chart [ ] staff [ ] other     Enteral /Parenteral Nutrition:       Current Weight: Weight (kg): 67.9 (06-21 @ 06:13)  % Weight Change    Pertinent Medications: MEDICATIONS  (STANDING):  aspirin enteric coated 81 milliGRAM(s) Oral daily  atovaquone Suspension 1500 milliGRAM(s) Oral daily  Calcium Citrate+D3 (315mg-250mg/tablet) 2 Tablet(s) 2 Tablet(s) Oral two times a day  famotidine    Tablet 20 milliGRAM(s) Oral daily  insulin glargine Injectable (LANTUS) 7 Unit(s) SubCutaneous at bedtime  insulin lispro (HumaLOG) corrective regimen sliding scale   SubCutaneous Before meals and at bedtime  insulin lispro Injectable (HumaLOG) 3 Unit(s) SubCutaneous before breakfast  insulin lispro Injectable (HumaLOG) 3 Unit(s) SubCutaneous before lunch  insulin lispro Injectable (HumaLOG) 3 Unit(s) SubCutaneous before dinner  magnesium oxide 400 milliGRAM(s) Oral every 12 hours  Mavyret 100mg/40mg 3 Tablet(s) 3 Tablet(s) Oral daily  multivitamin 1 Tablet(s) Oral daily  pravastatin 20 milliGRAM(s) Oral at bedtime  predniSONE   Tablet 7.5 milliGRAM(s) Oral daily  sodium bicarbonate 650 milliGRAM(s) Oral every 8 hours  sodium chloride 0.9% lock flush 3 milliLiter(s) IV Push every 8 hours  sodium chloride 0.9%. 1000 milliLiter(s) (50 mL/Hr) IV Continuous <Continuous>  tacrolimus 0.5 milliGRAM(s) Oral <User Schedule>  valGANciclovir 450 milliGRAM(s) Oral two times a day  voriconazole 200 milliGRAM(s) Oral every 12 hours    MEDICATIONS  (PRN):    Pertinent Labs:          Skin:     Estimated Needs:   [ ] no change since previous assessment  [ ] recalculated:       Previous Nutrition Diagnosis:     [ ] Inadequate Energy Intake [ ]Inadequate Oral Intake [ ] Excessive Energy Intake     [ ] Underweight [ ] Increased Nutrient Needs [ ] Overweight/Obesity     [ ] Altered GI Function [ ] Unintended Weight Loss [ ] Food & Nutrition Related Knowledge Deficit [ ] Malnutrition          Nutrition Diagnosis is [ ] ongoing  [ ] resolved [ ] not applicable          New Nutrition Diagnosis: [ ] not applicable    [ ] Inadequate Protein Energy Intake [ ]Inadequate Oral Intake [ ] Excessive Energy Intake     [ ] Underweight [ ] Increased Nutrient Needs [ ] Overweight/Obesity     [ ] Altered GI Function [ ] Unintended Weight Loss [ ] Food & Nutrition Related Knowledge Deficit[ ] Limited Adherence to nutrition related recommendations [ ] Malnutrition  [ ] other: Free text       Related to:      As evidenced by:      Interventions:     Recommend    [ ] Change Diet To:    [ ] Nutrition Supplement    [ ] Nutrition Support    [ ] Other:        Monitoring and Evaluation:     [ ] PO intake [ ] Tolerance to diet prescription [ ] weights [ ] follow up per protocol    [ ] other: Source: Patient [X ]    Family [ ]     other [ X] RN, Medical record    Pt seen for nutritional follow up. Pt seen sitting in chair reports feeling well. Pt denies questions regarding DM diet, Heart healthy or transplant nutrition. Pt reports a good PO intake, consuming 100% of meals. Pt continues to ask fro ice cream and hot dogs, pt is aware these are non complinat with his diet. Importance of a low NA and consistent CHO diet reinforced. Pt with multiple questions regarding continuos Blood glucose monitor that was placed PTA. Pt asking, when it comes off and how to return it; RN made aware.     Pt admitted for neutropenic fevers, and anemia. Per chart, Pt with history of NICM, s/p HM2 LVAD implant in 6/2017, recurrent GIB and heart transplant 2/2018. Fevers resolved. Pt with sepsis 2/2 PNA and now NORMAN. S/p Lung Biopsy. Discharge planning.     Diet : Consistent CHO, Low NA       Patient reports no GI distress     PO intake: %     Source for PO intake [ X] Patient [ ] family [ X] chart [ ] staff [ ] other    Current Weight: Weight (kg): 67.8kg  Admission Wt: 79.4, decrease noted; ? accuracy of weights obtained.   % Weight Change    Pertinent Medications: MEDICATIONS  (STANDING):  aspirin enteric coated 81 milliGRAM(s) Oral daily  atovaquone Suspension 1500 milliGRAM(s) Oral daily  Calcium Citrate+D3 (315mg-250mg/tablet) 2 Tablet(s) 2 Tablet(s) Oral two times a day  famotidine    Tablet 20 milliGRAM(s) Oral daily  insulin glargine Injectable (LANTUS) 7 Unit(s) SubCutaneous at bedtime  insulin lispro (HumaLOG) corrective regimen sliding scale   SubCutaneous Before meals and at bedtime  insulin lispro Injectable (HumaLOG) 3 Unit(s) SubCutaneous before breakfast  insulin lispro Injectable (HumaLOG) 3 Unit(s) SubCutaneous before lunch  insulin lispro Injectable (HumaLOG) 3 Unit(s) SubCutaneous before dinner  magnesium oxide 400 milliGRAM(s) Oral every 12 hours  Mavyret 100mg/40mg 3 Tablet(s) 3 Tablet(s) Oral daily  multivitamin 1 Tablet(s) Oral daily  pravastatin 20 milliGRAM(s) Oral at bedtime  predniSONE   Tablet 7.5 milliGRAM(s) Oral daily  sodium bicarbonate 650 milliGRAM(s) Oral every 8 hours  sodium chloride 0.9% lock flush 3 milliLiter(s) IV Push every 8 hours  sodium chloride 0.9%. 1000 milliLiter(s) (50 mL/Hr) IV Continuous <Continuous>  tacrolimus 0.5 milliGRAM(s) Oral <User Schedule>  valGANciclovir 450 milliGRAM(s) Oral two times a day  voriconazole 200 milliGRAM(s) Oral every 12 hours    MEDICATIONS  (PRN):    Pertinent Labs:  Hgb/Hct:8.2/26.7-low, Na:140, K:4.7, Cl:103, BUN:19, Cr:1.11, Glucose:85, Ca:9.1, Bg levels: 6/24: , 6/25: 94      Skin: intact, no edema     Estimated Needs:   [X ] no change since previous assessment  [ ] recalculated:       Previous Nutrition Diagnosis: None     Nutrition Diagnosis is [X ] ongoing  [ ] resolved [ ] not applicable          New Nutrition Diagnosis: [ X] not applicable     Interventions:     Recommend    1. Provide food preferences as requested by Pt/family within diet restrictions    2. Encourage PO intake during meal   3. Reviewed menu ordering procedures   4. diet education reinforced  5. Trend BG levels, renal indices, LFT's, and electrolytes        Monitoring and Evaluation:     [X ] PO intake [X ] Tolerance to diet prescription [X ] weights [X ] follow up per protocol    [ X] other: RD remains available Sarah Siegler RD, [facility] Pager #[phone]

## 2018-06-25 NOTE — PROGRESS NOTE ADULT - PROBLEM SELECTOR PLAN 6
- Continue atovaquone for PCP and toxo prophylaxis.   - Resumed valganciclovir 450 mg BID for CMV prophylaxis. Watch for recurrent leukopenia.

## 2018-06-25 NOTE — PROGRESS NOTE ADULT - SUBJECTIVE AND OBJECTIVE BOX
INFECTIOUS DISEASES FOLLOW UP-- Sujey Lujan  [phone]    This is a follow up note for this  68yMale with s/p heart transplant admitted with bacterial pneumonia now resolved feeling well s/p LLL lung biopsy with negative pathology result    interval events- swelling of his hand and pinky finger on the opposite side      ROS:  CONSTITUTIONAL:  No fever, good appetite  CARDIOVASCULAR:  No chest pain or palpitations  RESPIRATORY:  No dyspnea  GASTROINTESTINAL:  No nausea, vomiting, diarrhea, or abdominal pain  GENITOURINARY:  No dysuria  NEUROLOGIC:  No headache,     Allergies    No Known Allergies    Intolerances        ANTIBIOTICS/RELEVANT:  antimicrobials  atovaquone Suspension 1500 milliGRAM(s) Oral daily  valGANciclovir 450 milliGRAM(s) Oral two times a day  voriconazole 200 milliGRAM(s) Oral every 12 hours    immunologic:  tacrolimus 0.5 milliGRAM(s) Oral <User Schedule>    OTHER:  aspirin enteric coated 81 milliGRAM(s) Oral daily  Calcium Citrate+D3 (315mg-250mg/tablet) 2 Tablet(s) 2 Tablet(s) Oral two times a day  famotidine    Tablet 20 milliGRAM(s) Oral daily  insulin glargine Injectable (LANTUS) 7 Unit(s) SubCutaneous at bedtime  insulin lispro (HumaLOG) corrective regimen sliding scale   SubCutaneous Before meals and at bedtime  insulin lispro Injectable (HumaLOG) 3 Unit(s) SubCutaneous before breakfast  insulin lispro Injectable (HumaLOG) 3 Unit(s) SubCutaneous before lunch  insulin lispro Injectable (HumaLOG) 3 Unit(s) SubCutaneous before dinner  magnesium oxide 400 milliGRAM(s) Oral every 12 hours  Mavyret 100mg/40mg 3 Tablet(s) 3 Tablet(s) Oral daily  multivitamin 1 Tablet(s) Oral daily  pravastatin 20 milliGRAM(s) Oral at bedtime  predniSONE   Tablet 7.5 milliGRAM(s) Oral daily  sodium bicarbonate 650 milliGRAM(s) Oral every 8 hours  sodium chloride 0.9% lock flush 3 milliLiter(s) IV Push every 8 hours  sodium chloride 0.9%. 1000 milliLiter(s) IV Continuous <Continuous>      Objective:  Vital Signs Last 24 Hrs  T(C): 36.8 (25 Jun 2018 14:06), Max: 36.8 (25 Jun 2018 14:06)  T(F): 98.2 (25 Jun 2018 14:06), Max: 98.2 (25 Jun 2018 14:06)  HR: 109 (25 Jun 2018 14:06) (98 - 109)  BP: 108/71 (25 Jun 2018 14:06) (106/73 - 125/81)  BP(mean): --  RR: 18 (25 Jun 2018 14:06) (17 - 18)  SpO2: 98% (25 Jun 2018 14:06) (97% - 100%)    PHYSICAL EXAM:  Constitutional:no acute distress  Eyes:WALT, EOMI  Ear/Nose/Throat: no oral lesions, 	  Respiratory: clear BL  Cardiovascular: S1S2, sternotomy healed  Gastrointestinal:soft, (+) BS, no tenderness  Extremities:no e/e/c fifth digit proximal swelling/tender, hand on the opposite side with swelling near a former IV site  No Lymphadenopathy  IV sites not inflammed.    LABS:                        8.2    13.3  )-----------( 284      ( 25 Jun 2018 07:30 )             26.7     06-25    140  |  103  |  19  ----------------------------<  85  4.7   |  23  |  1.11    Ca    9.1      25 Jun 2018 07:29      PT/INR - ( 24 Jun 2018 07:39 )   PT: 12.9 sec;   INR: 1.19 ratio         PTT - ( 24 Jun 2018 07:39 )  PTT:29.3 sec      MICROBIOLOGY:            RECENT CULTURES:  06-20 @ 08:51  .Blood Blood  --  --  --    No growth at 5 days.  --  06-20 @ 00:43  .Blood Blood  --  --  --    No growth at 5 days.  --  06-20 @ 00:38  .Sputum Sputum  Pseudomonas aeruginosa (Carbapenem Resistant)  Pseudomonas aeruginosa (Carbapenem Resistant)  KAMILA    Rare Pseudomonas aeruginosa (Carbapenem Resistant)  Normal Respiratory Heaven present  --  06-19 @ 00:41  .Body Fluid Interstitial Fluid  --  --  --    No growth  --      RADIOLOGY & ADDITIONAL STUDIES:    < from: Xray Chest 1 View- PORTABLE-Routine (06.24.18 @ 05:45) >  Impression:    The heart is normal in size. Small right pleural effusion platelike   atelectasis right lower lobe. The left lung is clear. Status post   sternotomy. No pneumothorax.    < end of copied text >

## 2018-06-25 NOTE — DISCHARGE NOTE ADULT - CARE PROVIDER_API CALL
Edil Stahl (MD; MPH), [facility] Cardio; Cardiology  [address] [place] [address]  Phone: [phone]  Fax: [phone]    Ti Parker), Thoracic and Cardiac Surgery  [address] [place] [address]  Phone: [phone]  Fax: [phone]    Sujey Lujan), Infectious Disease; Internal Medicine  [address] [place] [address]  Phone: [phone]  Fax: [phone]

## 2018-06-25 NOTE — PROGRESS NOTE ADULT - ASSESSMENT
Mr. 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 He was admitted on 6/13 with severe leukopenia and neutropenic fevers with SIRS and hypotension. He received 4 doses of Neupogen and was improved, but subsequently developed a marked leukocytosis, which is now downtrending. His Valcyte was resumed, but the CellCept has been held. He had acute worsening of his renal function, which has resolved. He has a large left sided lung nodule/opacity of unclear etiology now s/p CT-guided biopsy by IR on 6/18/18, which was negative for malignant cells/fungal organism. He is s/p course of IV cefepime, and is currently afebrile, now on voriconazole. He appears euvolemic on exam. His tacro level today is slightly subtherapeutic at 7.2 down from 9.9, with goal of 8-10. He is normotensive and his blood sugars are currently well controlled.

## 2018-06-25 NOTE — DISCHARGE NOTE ADULT - CARE PLAN
Principal Discharge DX:	Pneumonia of left lower lobe due to infectious organism  Goal:	recovery  Assessment and plan of treatment:	Regular no added salt diet  Shower daily  Daily weights, call for a gain > 3 lbs  Secondary Diagnosis:	Heart transplant recipient

## 2018-06-26 VITALS
HEART RATE: 103 BPM | DIASTOLIC BLOOD PRESSURE: 85 MMHG | OXYGEN SATURATION: 99 % | SYSTOLIC BLOOD PRESSURE: 121 MMHG | RESPIRATION RATE: 18 BRPM | TEMPERATURE: 98 F

## 2018-06-26 DIAGNOSIS — M25.40 EFFUSION, UNSPECIFIED JOINT: ICD-10-CM

## 2018-06-26 LAB
ANION GAP SERPL CALC-SCNC: 9 MMOL/L — SIGNIFICANT CHANGE UP (ref 5–17)
BASOPHILS # BLD AUTO: 0.1 K/UL — SIGNIFICANT CHANGE UP (ref 0–0.2)
BASOPHILS NFR BLD AUTO: 0 % — SIGNIFICANT CHANGE UP (ref 0–2)
BUN SERPL-MCNC: 17 MG/DL — SIGNIFICANT CHANGE UP (ref 7–23)
CALCIUM SERPL-MCNC: 9.3 MG/DL — SIGNIFICANT CHANGE UP (ref 8.4–10.5)
CHLORIDE SERPL-SCNC: 105 MMOL/L — SIGNIFICANT CHANGE UP (ref 96–108)
CO2 SERPL-SCNC: 26 MMOL/L — SIGNIFICANT CHANGE UP (ref 22–31)
CREAT SERPL-MCNC: 1.04 MG/DL — SIGNIFICANT CHANGE UP (ref 0.5–1.3)
EOSINOPHIL # BLD AUTO: 0.1 K/UL — SIGNIFICANT CHANGE UP (ref 0–0.5)
EOSINOPHIL NFR BLD AUTO: 0 % — SIGNIFICANT CHANGE UP (ref 0–6)
GLUCOSE BLDC GLUCOMTR-MCNC: 142 MG/DL — HIGH (ref 70–99)
GLUCOSE BLDC GLUCOMTR-MCNC: 87 MG/DL — SIGNIFICANT CHANGE UP (ref 70–99)
GLUCOSE SERPL-MCNC: 85 MG/DL — SIGNIFICANT CHANGE UP (ref 70–99)
HCT VFR BLD CALC: 26 % — LOW (ref 39–50)
HGB BLD-MCNC: 8.1 G/DL — LOW (ref 13–17)
LYMPHOCYTES # BLD AUTO: 1.2 K/UL — SIGNIFICANT CHANGE UP (ref 1–3.3)
LYMPHOCYTES # BLD AUTO: 11 % — LOW (ref 13–44)
MCHC RBC-ENTMCNC: 29.9 PG — SIGNIFICANT CHANGE UP (ref 27–34)
MCHC RBC-ENTMCNC: 31.3 GM/DL — LOW (ref 32–36)
MCV RBC AUTO: 95.6 FL — SIGNIFICANT CHANGE UP (ref 80–100)
MONOCYTES # BLD AUTO: 0.9 K/UL — SIGNIFICANT CHANGE UP (ref 0–0.9)
MONOCYTES NFR BLD AUTO: 9 % — SIGNIFICANT CHANGE UP (ref 2–14)
NEUTROPHILS # BLD AUTO: 8.8 K/UL — HIGH (ref 1.8–7.4)
NEUTROPHILS NFR BLD AUTO: 76 % — SIGNIFICANT CHANGE UP (ref 43–77)
PLATELET # BLD AUTO: 365 K/UL — SIGNIFICANT CHANGE UP (ref 150–400)
POTASSIUM SERPL-MCNC: 5.1 MMOL/L — SIGNIFICANT CHANGE UP (ref 3.5–5.3)
POTASSIUM SERPL-SCNC: 5.1 MMOL/L — SIGNIFICANT CHANGE UP (ref 3.5–5.3)
RBC # BLD: 2.71 M/UL — LOW (ref 4.2–5.8)
RBC # FLD: 19.9 % — HIGH (ref 10.3–14.5)
SODIUM SERPL-SCNC: 140 MMOL/L — SIGNIFICANT CHANGE UP (ref 135–145)
TACROLIMUS SERPL-MCNC: 7.5 NG/ML — SIGNIFICANT CHANGE UP
WBC # BLD: 11 K/UL — HIGH (ref 3.8–10.5)
WBC # FLD AUTO: 11 K/UL — HIGH (ref 3.8–10.5)

## 2018-06-26 PROCEDURE — 82728 ASSAY OF FERRITIN: CPT

## 2018-06-26 PROCEDURE — 93306 TTE W/DOPPLER COMPLETE: CPT

## 2018-06-26 PROCEDURE — 87581 M.PNEUMON DNA AMP PROBE: CPT

## 2018-06-26 PROCEDURE — 71250 CT THORAX DX C-: CPT

## 2018-06-26 PROCEDURE — 83735 ASSAY OF MAGNESIUM: CPT

## 2018-06-26 PROCEDURE — 84155 ASSAY OF PROTEIN SERUM: CPT

## 2018-06-26 PROCEDURE — 84443 ASSAY THYROID STIM HORMONE: CPT

## 2018-06-26 PROCEDURE — 87640 STAPH A DNA AMP PROBE: CPT

## 2018-06-26 PROCEDURE — 82330 ASSAY OF CALCIUM: CPT

## 2018-06-26 PROCEDURE — 87015 SPECIMEN INFECT AGNT CONCNTJ: CPT

## 2018-06-26 PROCEDURE — 87486 CHLMYD PNEUM DNA AMP PROBE: CPT

## 2018-06-26 PROCEDURE — 82962 GLUCOSE BLOOD TEST: CPT

## 2018-06-26 PROCEDURE — 84133 ASSAY OF URINE POTASSIUM: CPT

## 2018-06-26 PROCEDURE — 96375 TX/PRO/DX INJ NEW DRUG ADDON: CPT

## 2018-06-26 PROCEDURE — 83615 LACTATE (LD) (LDH) ENZYME: CPT

## 2018-06-26 PROCEDURE — 88185 FLOWCYTOMETRY/TC ADD-ON: CPT

## 2018-06-26 PROCEDURE — 85610 PROTHROMBIN TIME: CPT

## 2018-06-26 PROCEDURE — 87633 RESP VIRUS 12-25 TARGETS: CPT

## 2018-06-26 PROCEDURE — 32405: CPT

## 2018-06-26 PROCEDURE — 87205 SMEAR GRAM STAIN: CPT

## 2018-06-26 PROCEDURE — 80197 ASSAY OF TACROLIMUS: CPT

## 2018-06-26 PROCEDURE — 87046 STOOL CULTR AEROBIC BACT EA: CPT

## 2018-06-26 PROCEDURE — 87206 SMEAR FLUORESCENT/ACID STAI: CPT

## 2018-06-26 PROCEDURE — 83605 ASSAY OF LACTIC ACID: CPT

## 2018-06-26 PROCEDURE — 88172 CYTP DX EVAL FNA 1ST EA SITE: CPT

## 2018-06-26 PROCEDURE — 73120 X-RAY EXAM OF HAND: CPT | Mod: 26,50

## 2018-06-26 PROCEDURE — 99285 EMERGENCY DEPT VISIT HI MDM: CPT | Mod: 25

## 2018-06-26 PROCEDURE — 85014 HEMATOCRIT: CPT

## 2018-06-26 PROCEDURE — 87070 CULTURE OTHR SPECIMN AEROBIC: CPT

## 2018-06-26 PROCEDURE — 83880 ASSAY OF NATRIURETIC PEPTIDE: CPT

## 2018-06-26 PROCEDURE — P9016: CPT

## 2018-06-26 PROCEDURE — 80053 COMPREHEN METABOLIC PANEL: CPT

## 2018-06-26 PROCEDURE — 76775 US EXAM ABDO BACK WALL LIM: CPT

## 2018-06-26 PROCEDURE — 86665 EPSTEIN-BARR CAPSID VCA: CPT

## 2018-06-26 PROCEDURE — 82947 ASSAY GLUCOSE BLOOD QUANT: CPT

## 2018-06-26 PROCEDURE — 88173 CYTOPATH EVAL FNA REPORT: CPT

## 2018-06-26 PROCEDURE — 84300 ASSAY OF URINE SODIUM: CPT

## 2018-06-26 PROCEDURE — 82668 ASSAY OF ERYTHROPOIETIN: CPT

## 2018-06-26 PROCEDURE — 85027 COMPLETE CBC AUTOMATED: CPT

## 2018-06-26 PROCEDURE — 82272 OCCULT BLD FECES 1-3 TESTS: CPT

## 2018-06-26 PROCEDURE — 87641 MR-STAPH DNA AMP PROBE: CPT

## 2018-06-26 PROCEDURE — 87040 BLOOD CULTURE FOR BACTERIA: CPT

## 2018-06-26 PROCEDURE — 87324 CLOSTRIDIUM AG IA: CPT

## 2018-06-26 PROCEDURE — 85730 THROMBOPLASTIN TIME PARTIAL: CPT

## 2018-06-26 PROCEDURE — 87116 MYCOBACTERIA CULTURE: CPT

## 2018-06-26 PROCEDURE — 87798 DETECT AGENT NOS DNA AMP: CPT

## 2018-06-26 PROCEDURE — 87449 NOS EACH ORGANISM AG IA: CPT

## 2018-06-26 PROCEDURE — 93970 EXTREMITY STUDY: CPT

## 2018-06-26 PROCEDURE — 71045 X-RAY EXAM CHEST 1 VIEW: CPT

## 2018-06-26 PROCEDURE — 83010 ASSAY OF HAPTOGLOBIN QUANT: CPT

## 2018-06-26 PROCEDURE — 84132 ASSAY OF SERUM POTASSIUM: CPT

## 2018-06-26 PROCEDURE — 84165 PROTEIN E-PHORESIS SERUM: CPT

## 2018-06-26 PROCEDURE — 84295 ASSAY OF SERUM SODIUM: CPT

## 2018-06-26 PROCEDURE — 82803 BLOOD GASES ANY COMBINATION: CPT

## 2018-06-26 PROCEDURE — 36430 TRANSFUSION BLD/BLD COMPNT: CPT

## 2018-06-26 PROCEDURE — 88184 FLOWCYTOMETRY/ TC 1 MARKER: CPT

## 2018-06-26 PROCEDURE — 82607 VITAMIN B-12: CPT

## 2018-06-26 PROCEDURE — 84156 ASSAY OF PROTEIN URINE: CPT

## 2018-06-26 PROCEDURE — 93970 EXTREMITY STUDY: CPT | Mod: 26

## 2018-06-26 PROCEDURE — 86663 EPSTEIN-BARR ANTIBODY: CPT

## 2018-06-26 PROCEDURE — 85045 AUTOMATED RETICULOCYTE COUNT: CPT

## 2018-06-26 PROCEDURE — 83550 IRON BINDING TEST: CPT

## 2018-06-26 PROCEDURE — 86900 BLOOD TYPING SEROLOGIC ABO: CPT

## 2018-06-26 PROCEDURE — 82746 ASSAY OF FOLIC ACID SERUM: CPT

## 2018-06-26 PROCEDURE — 86664 EPSTEIN-BARR NUCLEAR ANTIGEN: CPT

## 2018-06-26 PROCEDURE — 86403 PARTICLE AGGLUT ANTBDY SCRN: CPT

## 2018-06-26 PROCEDURE — 88305 TISSUE EXAM BY PATHOLOGIST: CPT

## 2018-06-26 PROCEDURE — 87102 FUNGUS ISOLATION CULTURE: CPT

## 2018-06-26 PROCEDURE — 86923 COMPATIBILITY TEST ELECTRIC: CPT

## 2018-06-26 PROCEDURE — 81001 URINALYSIS AUTO W/SCOPE: CPT

## 2018-06-26 PROCEDURE — 86334 IMMUNOFIX E-PHORESIS SERUM: CPT

## 2018-06-26 PROCEDURE — 80048 BASIC METABOLIC PNL TOTAL CA: CPT

## 2018-06-26 PROCEDURE — 87186 SC STD MICRODIL/AGAR DIL: CPT

## 2018-06-26 PROCEDURE — 87075 CULTR BACTERIA EXCEPT BLOOD: CPT

## 2018-06-26 PROCEDURE — 88312 SPECIAL STAINS GROUP 1: CPT

## 2018-06-26 PROCEDURE — 77012 CT SCAN FOR NEEDLE BIOPSY: CPT

## 2018-06-26 PROCEDURE — 99232 SBSQ HOSP IP/OBS MODERATE 35: CPT | Mod: GC

## 2018-06-26 PROCEDURE — 96374 THER/PROPH/DIAG INJ IV PUSH: CPT

## 2018-06-26 PROCEDURE — 86901 BLOOD TYPING SEROLOGIC RH(D): CPT

## 2018-06-26 PROCEDURE — 87045 FECES CULTURE AEROBIC BACT: CPT

## 2018-06-26 PROCEDURE — 87086 URINE CULTURE/COLONY COUNT: CPT

## 2018-06-26 PROCEDURE — 82435 ASSAY OF BLOOD CHLORIDE: CPT

## 2018-06-26 PROCEDURE — 73120 X-RAY EXAM OF HAND: CPT

## 2018-06-26 PROCEDURE — 86777 TOXOPLASMA ANTIBODY: CPT

## 2018-06-26 PROCEDURE — 86747 PARVOVIRUS ANTIBODY: CPT

## 2018-06-26 PROCEDURE — 86850 RBC ANTIBODY SCREEN: CPT

## 2018-06-26 RX ORDER — TACROLIMUS 5 MG/1
1 CAPSULE ORAL
Qty: 30 | Refills: 1 | OUTPATIENT
Start: 2018-06-26 | End: 2018-08-24

## 2018-06-26 RX ORDER — MAGNESIUM OXIDE 400 MG ORAL TABLET 241.3 MG
1 TABLET ORAL
Qty: 0 | Refills: 0 | COMMUNITY
Start: 2018-06-26

## 2018-06-26 RX ORDER — ATOVAQUONE 750 MG/5ML
10 SUSPENSION ORAL
Qty: 0 | Refills: 0 | COMMUNITY
Start: 2018-06-26

## 2018-06-26 RX ORDER — INSULIN LISPRO 100/ML
3 VIAL (ML) SUBCUTANEOUS
Qty: 100 | Refills: 0 | OUTPATIENT
Start: 2018-06-26 | End: 2018-07-25

## 2018-06-26 RX ORDER — TACROLIMUS 5 MG/1
1 CAPSULE ORAL
Qty: 30 | Refills: 0 | OUTPATIENT
Start: 2018-06-26 | End: 2018-07-25

## 2018-06-26 RX ORDER — ATORVASTATIN CALCIUM 80 MG/1
1 TABLET, FILM COATED ORAL
Qty: 30 | Refills: 1 | OUTPATIENT
Start: 2018-06-26 | End: 2018-08-24

## 2018-06-26 RX ORDER — MUPIROCIN 20 MG/G
2 OINTMENT TOPICAL
Qty: 1 | Refills: 0 | OUTPATIENT
Start: 2018-06-26

## 2018-06-26 RX ORDER — GLECAPREVIR AND PIBRENTASVIR 40; 100 MG/1; MG/1
3 TABLET, FILM COATED ORAL
Qty: 90 | Refills: 1 | OUTPATIENT
Start: 2018-06-26 | End: 2018-08-24

## 2018-06-26 RX ORDER — MAGNESIUM OXIDE 400 MG ORAL TABLET 241.3 MG
2 TABLET ORAL
Qty: 0 | Refills: 0 | COMMUNITY
Start: 2018-06-26

## 2018-06-26 RX ORDER — FAMOTIDINE 10 MG/ML
1 INJECTION INTRAVENOUS
Qty: 0 | Refills: 0 | COMMUNITY
Start: 2018-06-26

## 2018-06-26 RX ORDER — ENOXAPARIN SODIUM 100 MG/ML
7 INJECTION SUBCUTANEOUS
Qty: 6 | Refills: 0 | OUTPATIENT
Start: 2018-06-26 | End: 2018-07-25

## 2018-06-26 RX ORDER — VALGANCICLOVIR 450 MG/1
1 TABLET, FILM COATED ORAL
Qty: 0 | Refills: 0 | COMMUNITY
Start: 2018-06-26

## 2018-06-26 RX ORDER — ASPIRIN/CALCIUM CARB/MAGNESIUM 324 MG
1 TABLET ORAL
Qty: 0 | Refills: 0 | COMMUNITY
Start: 2018-06-26

## 2018-06-26 RX ORDER — SODIUM BICARBONATE 1 MEQ/ML
1 SYRINGE (ML) INTRAVENOUS
Qty: 0 | Refills: 0 | COMMUNITY
Start: 2018-06-26

## 2018-06-26 RX ADMIN — MAGNESIUM OXIDE 400 MG ORAL TABLET 400 MILLIGRAM(S): 241.3 TABLET ORAL at 06:05

## 2018-06-26 RX ADMIN — ATOVAQUONE 1500 MILLIGRAM(S): 750 SUSPENSION ORAL at 12:43

## 2018-06-26 RX ADMIN — FAMOTIDINE 20 MILLIGRAM(S): 10 INJECTION INTRAVENOUS at 12:06

## 2018-06-26 RX ADMIN — Medication 7.5 MILLIGRAM(S): at 06:05

## 2018-06-26 RX ADMIN — SODIUM CHLORIDE 3 MILLILITER(S): 9 INJECTION INTRAMUSCULAR; INTRAVENOUS; SUBCUTANEOUS at 06:06

## 2018-06-26 RX ADMIN — TACROLIMUS 0.5 MILLIGRAM(S): 5 CAPSULE ORAL at 08:00

## 2018-06-26 RX ADMIN — Medication 81 MILLIGRAM(S): at 12:04

## 2018-06-26 RX ADMIN — Medication 650 MILLIGRAM(S): at 12:06

## 2018-06-26 RX ADMIN — Medication 1 TABLET(S): at 12:05

## 2018-06-26 RX ADMIN — Medication 650 MILLIGRAM(S): at 06:05

## 2018-06-26 RX ADMIN — Medication 3 UNIT(S): at 08:04

## 2018-06-26 RX ADMIN — Medication 3 UNIT(S): at 12:09

## 2018-06-26 RX ADMIN — VALGANCICLOVIR 450 MILLIGRAM(S): 450 TABLET, FILM COATED ORAL at 06:05

## 2018-06-26 NOTE — PROGRESS NOTE ADULT - PROBLEM SELECTOR PLAN 2
- No sig change in graft function on recent TTE  - CellCept held given prior leukopenia and now infectious process. Will closely monitor WBC count, which is downtrending, now WBC 11 from 13.3.  - Would increase tacro to 2mg Q12, as he is subtherapeutic now off voriconazole, which potentiates tacro. Last dose of vori given 6/26 AM  - Continue prednisone 7.5 mg daily. Due to reduce to 5 mg next week on 6/28/18.  - Continue ASA and pravastatin for CAV ppx  - Continue pepcid 20mg daily for GI ppx and Citracal + vit D for bone health while on steroids  - Continue current dose of mag, MVI. Mag level added on to labs from this AM. - No sig change in graft function on recent TTE, next bx on 7/2  - CellCept held given prior leukopenia and now infectious process. Will closely monitor WBC count, which is downtrending, now WBC 11 from 13.3.  - Would increase tacro to 2mg Q12, as he is subtherapeutic now off voriconazole, which potentiates tacro. Last dose of vori given 6/26 AM  - Continue prednisone 7.5 mg daily. Due to reduce to 5 mg next week on 6/28/18.  - Continue ASA and pravastatin for CAV ppx  - Continue pepcid 20mg daily for GI ppx and Citracal + vit D for bone health while on steroids  - Continue current dose of mag, MVI. Mag level added on to labs from this AM. - No sig change in graft function on recent TTE, next bx on 7/2  - CellCept held given prior leukopenia and now infectious process. Will closely monitor WBC count, which is downtrending, now WBC 11 from 13.3.  - Will keep tacro dosing as is at 0.5mg in AM and 1mg in PM.  - Continue prednisone 7.5 mg daily. Will plan to reduce on Monday following bx.  - Labs to be drawn this Friday AM prior to tacro to reeval CBC with diff, CMP, and tacro level.  - Continue ASA and pravastatin for CAV ppx  - Continue pepcid 20mg daily for GI ppx and Citracal + vit D for bone health while on steroids  - Continue current dose of mag, MVI. Mag level added on to labs from this AM.

## 2018-06-26 NOTE — PROGRESS NOTE ADULT - ASSESSMENT
Mr. 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 He was admitted on 6/13 with severe leukopenia and neutropenic fevers with SIRS and hypotension. He received 4 doses of Neupogen and was improved, but subsequently developed a marked leukocytosis, which is now downtrending. His Valcyte was resumed, but the CellCept remains on hold. He had acute worsening of his renal function, which has resolved. He has a large left sided lung nodule/opacity of unclear etiology now s/p CT-guided biopsy by IR on 6/18/18, which was negative for malignant cells/fungal organism. He is s/p course of IV cefepime, and is currently afebrile, now off voriconazole per ID. He appears euvolemic on exam. His tacro level today is slightly subtherapeutic at 7.5 from 7.2, with goal of 8-10. He is normotensive and his blood sugars are currently well controlled. Potassium up-trending, now 5.1.

## 2018-06-26 NOTE — PROGRESS NOTE ADULT - PROBLEM SELECTOR PLAN 8
- R medial wrist and L 5th finger, middle and distal joints with swelling and tenderness, could possibly be gout in setting of tacro  - s/p BUE venous duplex this morning per Dr. Tavarez to eval for superficial or DVT  - Also had xrays taken to further evaluate - R medial wrist and L 5th finger, middle and distal joints with swelling and tenderness. R wrist site of IV infiltration, improving.  - s/p BUE venous duplex this morning per Dr. Tavarez to eval for superficial or DVT  - Also had xrays taken to further evaluate

## 2018-06-26 NOTE — PROGRESS NOTE ADULT - ATTENDING COMMENTS
Venous dopplers show no DVT, just thrombosis of superficial cephalic veins.  Voriconazole was stopped by ID yesterday. Will need to adjust tacro dose. Will d/w Dr. Mccabe.  Also will clarify when Cellcept will be resumed.  Possible gout in L finger. monitor.  D/c home today w f/u on Friday.  Cardiac bx planned on July 2. Venous dopplers show no DVT, just thrombosis of superficial cephalic veins.  Clarify when Cellcept will be resumed.  Possible gout in L finger. Monitor.  D/c home today w f/u on Friday.  Cardiac bx planned on July 2. Venous dopplers show no DVT, just thrombosis of superficial cephalic veins. D/w vascular attg; no intervention.  Clarify when Cellcept will be resumed.  Possible gout in L finger. Monitor.  D/c home today w f/u on Friday.  Cardiac bx planned on July 2.

## 2018-06-26 NOTE — PROGRESS NOTE ADULT - PROBLEM SELECTOR PLAN 6
- Continue atovaquone for PCP and toxo prophylaxis.   - Resumed valganciclovir 450 mg BID for CMV prophylaxis. Will monitor for recurrent leukopenia.

## 2018-06-26 NOTE — PROGRESS NOTE ADULT - SUBJECTIVE AND OBJECTIVE BOX
im good  VITAL SIGNS    Telemetry:  nsr 90    Vital Signs Last 24 Hrs  T(C): 36.6 (06-26-18 @ 13:24), Max: 36.7 (06-25-18 @ 20:23)  T(F): 97.9 (06-26-18 @ 13:24), Max: 98.1 (06-25-18 @ 20:23)  HR: 103 (06-26-18 @ 13:24) (96 - 103)  BP: 121/85 (06-26-18 @ 13:24) (117/77 - 121/85)  RR: 18 (06-26-18 @ 13:24) (18 - 18)  SpO2: 99% (06-26-18 @ 13:24) (96% - 100%)                   06-25 @ 07:01  -  06-26 @ 07:00  --------------------------------------------------------  IN: 620 mL / OUT: 2070 mL / NET: -1450 mL    06-26 @ 07:01  -  06-26 @ 14:17  --------------------------------------------------------  IN: 320 mL / OUT: 400 mL / NET: -80 mL          Daily     Daily         CAPILLARY BLOOD GLUCOSE      POCT Blood Glucose.: 142 mg/dL (26 Jun 2018 11:40)  POCT Blood Glucose.: 87 mg/dL (26 Jun 2018 07:13)  POCT Blood Glucose.: 90 mg/dL (25 Jun 2018 21:45)  POCT Blood Glucose.: 148 mg/dL (25 Jun 2018 16:40)                Coumadin    [ ] YES          [ x ]      NO                                   PHYSICAL EXAM        Neurology: alert and oriented x 3, nonfocal, no gross deficits  CV : .S1S2 RRR  Sternal Wound :  CDI , Stable  Lungs: cta  Abdomen: soft, nontender, nondistended, positive bowel sounds, last bowel movement +  :         voiding    Extremities:     - edema - calve tenderness  LUE hand with 4th finger edema          aspirin enteric coated 81 milliGRAM(s) Oral daily  atovaquone Suspension 1500 milliGRAM(s) Oral daily  Calcium Citrate+D3 (315mg-250mg/tablet) 2 Tablet(s) 2 Tablet(s) Oral two times a day  famotidine    Tablet 20 milliGRAM(s) Oral daily  insulin glargine Injectable (LANTUS) 7 Unit(s) SubCutaneous at bedtime  insulin lispro (HumaLOG) corrective regimen sliding scale   SubCutaneous Before meals and at bedtime  insulin lispro Injectable (HumaLOG) 3 Unit(s) SubCutaneous before breakfast  insulin lispro Injectable (HumaLOG) 3 Unit(s) SubCutaneous before lunch  insulin lispro Injectable (HumaLOG) 3 Unit(s) SubCutaneous before dinner  magnesium oxide 400 milliGRAM(s) Oral every 12 hours  Mavyret 100mg/40mg 3 Tablet(s) 3 Tablet(s) Oral daily  multivitamin 1 Tablet(s) Oral daily  pravastatin 20 milliGRAM(s) Oral at bedtime  predniSONE   Tablet 7.5 milliGRAM(s) Oral daily  sodium bicarbonate 650 milliGRAM(s) Oral every 8 hours  sodium chloride 0.9% lock flush 3 milliLiter(s) IV Push every 8 hours  sodium chloride 0.9%. 1000 milliLiter(s) IV Continuous <Continuous>  tacrolimus 0.5 milliGRAM(s) Oral <User Schedule>  tacrolimus 1 milliGRAM(s) Oral <User Schedule>  valGANciclovir 450 milliGRAM(s) Oral two times a day                    Physical Therapy Rec:   Home  [  ]   Home w/ PT  [  ]  Rehab  [  ]  Discussed with Cardiothoracic Team at AM rounds.

## 2018-06-26 NOTE — PROGRESS NOTE ADULT - SUBJECTIVE AND OBJECTIVE BOX
Learner: Patient  Barriers: patient has hard time to remember information that is provided to him. He needs multiple sessions and reinforcement.     Patient post cardiac transplant     Method used: Verbal discussion and written materials    Medication safety was discussed with the patient: allergies, herbals, adherence, interactions, medication precautions, miss dose instructions, purpose, side effects, signs and symptoms to report, and storage & handling    Patient was able to repeat and verbalize key points    Education Summary: Discharge immunosuppressant medications and prophylactic anti-infective agents reviewed with the patient. Outpatient medication schedule was discussed in detail including: medication name, indication, dose, administration times, treatment duration, side effects, drug interactions, and special instructions. Patient questions and concerns were answered and addressed. Patient demonstrated understanding. The pill box was filled. We went over new medications (new Prograf dose was introduced, voriconazole), and regarding a new dose of Prograf (0.5 mg and 1 mg) due to the DDI due to voriconazole. In addition, we discussed insulins and importance of checking the blood sugar.      Time spent discharge education: 90 minutes    MEDICATIONS  (STANDING):  aspirin enteric coated 81 milliGRAM(s) Oral daily  atovaquone Suspension 1500 milliGRAM(s) Oral daily  Calcium Citrate+D3 (315mg-250mg/tablet) 2 Tablet(s) 2 Tablet(s) Oral two times a day  famotidine    Tablet 20 milliGRAM(s) Oral daily  insulin glargine Injectable (LANTUS) 7 Unit(s) SubCutaneous at bedtime  insulin lispro (HumaLOG) corrective regimen sliding scale   SubCutaneous Before meals and at bedtime  insulin lispro Injectable (HumaLOG) 3 Unit(s) SubCutaneous before breakfast  insulin lispro Injectable (HumaLOG) 3 Unit(s) SubCutaneous before lunch  insulin lispro Injectable (HumaLOG) 3 Unit(s) SubCutaneous before dinner  magnesium oxide 400 milliGRAM(s) Oral every 12 hours  Mavyret 100mg/40mg 3 Tablet(s) 3 Tablet(s) Oral daily  multivitamin 1 Tablet(s) Oral daily  pravastatin 20 milliGRAM(s) Oral at bedtime  predniSONE   Tablet 7.5 milliGRAM(s) Oral daily  sodium bicarbonate 650 milliGRAM(s) Oral every 8 hours  sodium chloride 0.9% lock flush 3 milliLiter(s) IV Push every 8 hours  sodium chloride 0.9%. 1000 milliLiter(s) (50 mL/Hr) IV Continuous <Continuous>  tacrolimus 0.5 milliGRAM(s) Oral <User Schedule>  tacrolimus 1 milliGRAM(s) Oral <User Schedule>  valGANciclovir 450 milliGRAM(s) Oral two times a day    MEDICATIONS  (PRN):      Cardiac Transplant Medications:  Tacrolimus adjusted to trough 0.5 mG at 9 AM and 1 mG at 9 PM  Mycophenolate mofetil - on hold for now   Prednisone taper - 7.5 mG PO daily   Atovaquone 1,500 mG/10 mL PO daily   Valganciclovir 450 mG PO twice a day   Docusate 300 mG PO at  bedtime  Famotidine 20 mG PO daily  Pravastatin 20 mG PO at bedtime                       8.1    11.0  )-----------( 365      ( 26 Jun 2018 07:27 )             26.0     06-26    140  |  105  |  17  ----------------------------<  85  5.1   |  26  |  1.04    Ca    9.3      26 Jun 2018 07:27        Tacrolimus (), Serum: 7.5 ng/mL (06-26-18 @ 09:22)  Tacrolimus (), Serum: 7.2 ng/mL (06-25-18 @ 07:59)  Tacrolimus (), Serum: 9.9 ng/mL (06-24-18 @ 10:34)

## 2018-06-26 NOTE — PROGRESS NOTE ADULT - SUBJECTIVE AND OBJECTIVE BOX
Subjective:    Medications:  aspirin enteric coated 81 milliGRAM(s) Oral daily  atovaquone Suspension 1500 milliGRAM(s) Oral daily  Calcium Citrate+D3 (315mg-250mg/tablet) 2 Tablet(s) 2 Tablet(s) Oral two times a day  famotidine    Tablet 20 milliGRAM(s) Oral daily  insulin glargine Injectable (LANTUS) 7 Unit(s) SubCutaneous at bedtime  insulin lispro (HumaLOG) corrective regimen sliding scale   SubCutaneous Before meals and at bedtime  insulin lispro Injectable (HumaLOG) 3 Unit(s) SubCutaneous before breakfast  insulin lispro Injectable (HumaLOG) 3 Unit(s) SubCutaneous before lunch  insulin lispro Injectable (HumaLOG) 3 Unit(s) SubCutaneous before dinner  magnesium oxide 400 milliGRAM(s) Oral every 12 hours  Mavyret 100mg/40mg 3 Tablet(s) 3 Tablet(s) Oral daily  multivitamin 1 Tablet(s) Oral daily  pravastatin 20 milliGRAM(s) Oral at bedtime  predniSONE   Tablet 7.5 milliGRAM(s) Oral daily  sodium bicarbonate 650 milliGRAM(s) Oral every 8 hours  sodium chloride 0.9% lock flush 3 milliLiter(s) IV Push every 8 hours  sodium chloride 0.9%. 1000 milliLiter(s) IV Continuous <Continuous>  tacrolimus 0.5 milliGRAM(s) Oral <User Schedule>  tacrolimus 1 milliGRAM(s) Oral <User Schedule>  valGANciclovir 450 milliGRAM(s) Oral two times a day    Physical Exam:    Vitals:  Vital Signs Last 24 Hours  T(C): 36.7 (06-26-18 @ 05:43), Max: 36.8 (06-25-18 @ 14:06)  HR: 96 (06-26-18 @ 05:43) (96 - 109)  BP: 117/77 (06-26-18 @ 05:43) (108/71 - 118/82)  RR: 18 (06-26-18 @ 05:43) (18 - 18)  SpO2: 96% (06-26-18 @ 05:43) (96% - 100%)        I&O's Summary    25 Jun 2018 07:01  -  26 Jun 2018 07:00  --------------------------------------------------------  IN: 620 mL / OUT: 2070 mL / NET: -1450 mL    26 Jun 2018 07:01  -  26 Jun 2018 12:13  --------------------------------------------------------  IN: 120 mL / OUT: 0 mL / NET: 120 mL        Tele:    General: No distress. Comfortable.  HEENT: EOM intact.  Neck: Neck supple. JVP not elevated. No masses  Chest: Clear to auscultation bilaterally  CV: Normal S1 and S2. No murmurs, rub, or gallops. Radial pulses normal.  Abdomen: Soft, non-distended, non-tender  Skin: No rashes or skin breakdown  Neurology: Alert and oriented times three. Sensation intact  Psych: Affect normal    Labs:                        8.1    11.0  )-----------( 365      ( 26 Jun 2018 07:27 )             26.0     06-26    140  |  105  |  17  ----------------------------<  85  5.1   |  26  |  1.04    Ca    9.3      26 Jun 2018 07:27 Subjective:  - Had BUE venous duplex done this morning, results pending  - Endorses tenderness of R medial wrist and L 5th finger.  - Feels well. Denies fever, chills, fatigue, LOBATO, orthopnea, PND, CP, palpitations, lightheadedness, dizziness, abdominal pain.    Medications:  aspirin enteric coated 81 milliGRAM(s) Oral daily  atovaquone Suspension 1500 milliGRAM(s) Oral daily  Calcium Citrate+D3 (315mg-250mg/tablet) 2 Tablet(s) 2 Tablet(s) Oral two times a day  famotidine    Tablet 20 milliGRAM(s) Oral daily  insulin glargine Injectable (LANTUS) 7 Unit(s) SubCutaneous at bedtime  insulin lispro (HumaLOG) corrective regimen sliding scale   SubCutaneous Before meals and at bedtime  insulin lispro Injectable (HumaLOG) 3 Unit(s) SubCutaneous before breakfast  insulin lispro Injectable (HumaLOG) 3 Unit(s) SubCutaneous before lunch  insulin lispro Injectable (HumaLOG) 3 Unit(s) SubCutaneous before dinner  magnesium oxide 400 milliGRAM(s) Oral every 12 hours  Mavyret 100mg/40mg 3 Tablet(s) 3 Tablet(s) Oral daily  multivitamin 1 Tablet(s) Oral daily  pravastatin 20 milliGRAM(s) Oral at bedtime  predniSONE   Tablet 7.5 milliGRAM(s) Oral daily  sodium bicarbonate 650 milliGRAM(s) Oral every 8 hours  sodium chloride 0.9% lock flush 3 milliLiter(s) IV Push every 8 hours  sodium chloride 0.9%. 1000 milliLiter(s) IV Continuous <Continuous>  tacrolimus 0.5 milliGRAM(s) Oral <User Schedule>  tacrolimus 1 milliGRAM(s) Oral <User Schedule>  valGANciclovir 450 milliGRAM(s) Oral two times a day    Physical Exam:    Vitals:  Vital Signs Last 24 Hours  T(C): 36.7 (06-26-18 @ 05:43), Max: 36.8 (06-25-18 @ 14:06)  HR: 96 (06-26-18 @ 05:43) (96 - 109)  BP: 117/77 (06-26-18 @ 05:43) (108/71 - 118/82)  RR: 18 (06-26-18 @ 05:43) (18 - 18)  SpO2: 96% (06-26-18 @ 05:43) (96% - 100%)    I&O's Summary    25 Jun 2018 07:01  -  26 Jun 2018 07:00  --------------------------------------------------------  IN: 620 mL / OUT: 2070 mL / NET: -1450 mL    26 Jun 2018 07:01  -  26 Jun 2018 12:13  --------------------------------------------------------  IN: 120 mL / OUT: 0 mL / NET: 120 mL    Tele: SR HR 100s    General: OOB in chair. No distress. Comfortable.  Neck: Neck supple. JVP not elevated. No masses  Chest: Clear to auscultation bilaterally  CV: Regular. Normal S1 and S2. No murmurs, rub, or gallops. Radial pulses normal. No LE edema.  Abdomen: Soft, non-distended, non-tender  Skin: Necrosis at distal tip of R third digit.   Ext: Swelling and tenderness at R medial wrist and L 5th finger at middle and distal joints.  Neurology: Alert and oriented times three. Sensation intact  Psych: Affect normal    Labs:                        8.1    11.0  )-----------( 365      ( 26 Jun 2018 07:27 )             26.0     06-26    140  |  105  |  17  ----------------------------<  85  5.1   |  26  |  1.04    Ca    9.3      26 Jun 2018 07:27    Tacro:   7.5<- 7.2 <- 9.9 <- 14.4

## 2018-06-26 NOTE — PROGRESS NOTE ADULT - ATTENDING COMMENTS
Patient seen and examined with Dr. Fonseca  I agree with his interval history and exam findings and plans as noted above    After further discussion with the transplant team will continue the Voriconazole as an out patient for empiric fungal coverage and plan to repeat CT of chest to further evaluate the nodular/mass to see if it is shrinking with antifungals and post anti-infective course

## 2018-06-26 NOTE — PROGRESS NOTE ADULT - ASSESSMENT
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 EF 43% His course also complicated by bilateral IJ/subclavian  thrombi, small  persistent right pleural effusion as well as acute on chronic renal failure. 5/23/18 admitted for hyperglycemia, hyponatremia and acute kidney injury  Today 6/18 BIBEMS to [facility] ED reports fever  rigors, labored breathing productive cough with clear sputum  for past two days ,unable to give himself injections r/t shaking home RN called 911    In ER, sinus tach 130s improved to 110s after 1L IVF and vanc/zosyn infusions. /75, satting high 90s on 2L NC, w resp rate 22. Rectal temp recorded 38.8C.  Admitted to [address] [place] [address] SDU  Seen by heart failure team ananya callahan      6/14 - HCT =20.6 Packed RBCs ordered by DR. Stahl- will check HCT 2 hrs post-transfusion- if HCT still low - will transfuse a 2nd unit RBC  echo done - unchanged from last echo  tacro level 8.2 - will d/w Dr. Stahl-pt to remain in Step down unit on neutropenic precautions for WBC 1  Optho consult appreciated to r/o CMV retinitis - eyes dilated  Heme consult-to see pt in am - persistent anemia - etiology - ?hemolysis ?bone marrow ?blood loss  6/15 wbc remans low  Tacro >12, dose changed 7 bid  Ct scan pending r/o pna.  Nephrology consult called for elevated creat to 1.6  3  raised areas under his R armpit, uncertain etiology, Derm consulted.  6/16 Tacra level 11.7 - continue tacro 7 bid. maintain level around 10  6/17 unable to obtain blood specimen from pt. after 3 attempts - pt refusing further attempts - spoke with DR. Stahl - will get labs @ 7pm prior to 8pm tacra dose along with all other labs. d/c planning- ID switched to Cefepime 2g IV q12h for better coverage  6/18 Chest ct with LLL mass/abcess.  Pt remains with cough, wbc up but could be r/t tx, afebrile.  Will obtain a biopsy in IR.  IR called.   IR re contacted by Dr Lozano , d/w Dr. Fuentes. biopsy today.  Aldactone increased for hypokalemia  6/19  Await biopsy results, will d/w id the ongoing antibiotic coverage.   Derm was consulted for lesions under R axilla on 6/16 :Lesions are consistent with epidermal inclusion cysts,outpatient tx  WBC 40 this am, d/w Dr Mccabe, afebrile, voriconozole started.  Prograf dose decreased  2nd to potentiation from voriconozole  Pulmonary consult called-rec f/u biopsy, no bronchoscopy at this time  6/20 Wbc up to 43. Await cultures  6/21 Leukocytosis 41.2  trending down. As per transplant team  tacrolimus 1mg bid  prednisone 7.5 mg Id recommending to  D/c cefepime6/22 continue Voriconazole empirically pending biopsy/culture results   6/22 Awaiting biopsy results.  WBC trending down. Remains on cefipime.  Will discuss with ID  6/23 VSS. WBC 22.9-trending down. Tacro level 14.4. Tacro 0.5mg PO BID starting tonight. Continue IV voriconazole per ID. Mavyret PO for Hep C-to be available by pharmacy on 6/25 11 AM. D/C planning mon vs tues. F/U Blood cultures  6/24 tacro level 9.0 no active issues   6/25 D/c Planning for tomorrow, tacro 7.2.  6/26L 4th digit  swelling, warm.  Vascular cardiology re consulted.  UE duplex< from: [facility] Duplex Upper Ext Vein Scan, Bilat (06.26.18 @ 11:07) >  mpression: Interval examination shows a residual hematoma in the soft   tissues of the right upper arm to have decreased in size.    No DVT is identified on this examination.    Superficial thrombophlebitis affects the right and left cephalic veins.    < end of copied text >  D/c planning for today

## 2018-06-26 NOTE — PROGRESS NOTE ADULT - PROBLEM SELECTOR PROBLEM 1
Neutropenic fever
Mass of lower lobe of left lung
Neutropenic fever
Neutropenic fever
Anemia
Mass of lower lobe of left lung
NORMAN (acute kidney injury)
Neutropenic fever
Mass of lower lobe of left lung
Neutropenic fever
Anemia
Mass of lower lobe of left lung

## 2018-06-26 NOTE — PROGRESS NOTE ADULT - ASSESSMENT
68M s/p cardiac transplant 2/23/18 for NICM, on Tacrolimus, Cellcept and Prednisone and HCV acquired from donor on Mavyret, admitted 6/13/18 with neutropenic fever 104.2F.   Treated for pneumonia, Pseudomonas aeruginosa on prior and current sputum cultures, completed Zosyn 6/13-6/17, Cefepime 6/17-6/22. CT chest 6/15 increased LLL rounded opacity, s/p IR biopsy 6/18 - negative for malignancy and cultures including fungal and AFB negative. Received empiric Voriconazole 6/19-6/25 for concern for fungal component. Fungitell negative, cryptococcus serum antigen negative.   Neutropenia resolved with filgrastim 6/13-6/16. Afebrile since initial presenting temp.     Overall, initially neutropenic fever with pseudomonas pneumonia with question of fungal component given nodule but all pathology and cultures are negative.  Discharge planning today.     Recommend  -continue Mepron for PCP PPX  -continue Valgancyclovir 450mg PO BID   -Voriconazole stopped yesterday   -monitor Tacrolimus levels and adjust dose after stopping Voriconazole 68M s/p cardiac transplant 2/23/18 for NICM, on Tacrolimus, Cellcept and Prednisone and HCV acquired from donor on Mavyret, admitted 6/13/18 with neutropenic fever 104.2F.   Treated for pneumonia, Pseudomonas aeruginosa on prior and current sputum cultures, completed Zosyn 6/13-6/17, Cefepime 6/17-6/22. CT chest 6/15 increased LLL rounded opacity, concerned for fungal component and received empiric Voriconazole 6/19-6/25 but IR biopsy 6/18 pathology and cultures including fungal and AFB negative. Fungitell negative, cryptococcus serum antigen negative.   Neutropenia resolved with filgrastim 6/13-6/16. Afebrile since initial presenting temp. Clinically well.   Discharge planning today.     Recommend  -continue Mepron for PCP PPX  -continue Valgancyclovir 450mg PO BID   -Voriconazole stopped yesterday, fungal workup negative but primary team continuing at discharge

## 2018-06-26 NOTE — PROGRESS NOTE ADULT - PROBLEM SELECTOR PROBLEM 3
PNA (pneumonia)
Heart transplant recipient
PNA (pneumonia)
Neutropenic fever
PNA (pneumonia)
Transplanted heart
Heart transplant recipient
Neutropenic fever
PNA (pneumonia)
Transplanted heart
Neutropenic fever
Neutropenic fever

## 2018-06-26 NOTE — PROGRESS NOTE ADULT - PROBLEM SELECTOR PROBLEM 5
Hepatitis C virus
RTA (renal tubular acidosis)
Acute renal failure
Acute renal failure
Hepatitis C virus
RTA (renal tubular acidosis)
RTA (renal tubular acidosis)
Acute renal failure

## 2018-06-26 NOTE — PROGRESS NOTE ADULT - PROBLEM SELECTOR PROBLEM 8
Hepatitis C virus
Hepatitis C virus
Prophylactic antibiotic
Hepatitis C virus
Joint swelling
Finger lesion
Prophylactic antibiotic

## 2018-06-26 NOTE — PROGRESS NOTE ADULT - PROBLEM SELECTOR PLAN 1
- Per cytology report, smears display a hypocellular specimen composed with  benign bronchial cells, reactive pneumocytes and macrophages.  - Special stains GMS stain is negative for fungal organisms. AFB  stain is negative for acid fast bacilli.  - Fungal culture still pending  - Per Dr. Lujan, cefepime completed, and no need to continue voriconazole. He will need repeat CT scan in 1 month. - Per cytology report, smears display a hypocellular specimen composed with  benign bronchial cells, reactive pneumocytes and macrophages.  - Special stains GMS stain is negative for fungal organisms. AFB  stain is negative for acid fast bacilli.  - Fungal culture still pending  - Will continue with voriconazole at this time. He will need repeat CT outpatient to follow up.

## 2018-06-26 NOTE — PROGRESS NOTE ADULT - PROBLEM SELECTOR PROBLEM 2
Heart transplant recipient
Heart transplant recipient
SIRS (systemic inflammatory response syndrome)
Transplanted heart
Heart transplant recipient
Neutropenic fever
SIRS (systemic inflammatory response syndrome)
Transplanted heart
Heart transplant recipient
Neutropenic fever
Transplanted heart

## 2018-06-26 NOTE — PROGRESS NOTE ADULT - SUBJECTIVE AND OBJECTIVE BOX
Follow Up: Cardiac transplant. Fever    Interval History: He says "I feel great!" Discharging today. Slight unchanged cough. No SOB. Right wrist pain and swelling, had dopplers and an XR done.     ROS:    All other systems negative  Constitutional: no fever, no chills  HEENT:  no sore throat  Cardiovascular:  no chest pain  GI:  no abd pain, no diarrhea  urinary: no dysuria    Allergies  No Known Allergies        ANTIMICROBIALS:  atovaquone Suspension 1500 daily  valGANciclovir 450 two times a day      OTHER MEDS:  aspirin enteric coated 81 milliGRAM(s) Oral daily  Calcium Citrate+D3 (315mg-250mg/tablet) 2 Tablet(s) 2 Tablet(s) Oral two times a day  famotidine    Tablet 20 milliGRAM(s) Oral daily  insulin glargine Injectable (LANTUS) 7 Unit(s) SubCutaneous at bedtime  insulin lispro (HumaLOG) corrective regimen sliding scale   SubCutaneous Before meals and at bedtime  insulin lispro Injectable (HumaLOG) 3 Unit(s) SubCutaneous before breakfast  insulin lispro Injectable (HumaLOG) 3 Unit(s) SubCutaneous before lunch  insulin lispro Injectable (HumaLOG) 3 Unit(s) SubCutaneous before dinner  magnesium oxide 400 milliGRAM(s) Oral every 12 hours  Mavyret 100mg/40mg 3 Tablet(s) 3 Tablet(s) Oral daily  multivitamin 1 Tablet(s) Oral daily  pravastatin 20 milliGRAM(s) Oral at bedtime  predniSONE   Tablet 7.5 milliGRAM(s) Oral daily  sodium bicarbonate 650 milliGRAM(s) Oral every 8 hours  sodium chloride 0.9% lock flush 3 milliLiter(s) IV Push every 8 hours  sodium chloride 0.9%. 1000 milliLiter(s) IV Continuous <Continuous>  tacrolimus 0.5 milliGRAM(s) Oral <User Schedule>  tacrolimus 1 milliGRAM(s) Oral <User Schedule>      Vital Signs Last 24 Hrs  T(C): 36.6 (26 Jun 2018 13:24), Max: 36.7 (25 Jun 2018 20:23)  T(F): 97.9 (26 Jun 2018 13:24), Max: 98.1 (25 Jun 2018 20:23)  HR: 103 (26 Jun 2018 13:24) (96 - 103)  BP: 121/85 (26 Jun 2018 13:24) (117/77 - 121/85)  BP(mean): --  RR: 18 (26 Jun 2018 13:24) (18 - 18)  SpO2: 99% (26 Jun 2018 13:24) (96% - 100%)    Physical Exam:  General:    NAD,  non toxic, A&O x 3  HEENT:    no oropharyngeal lesions,   no LAD,   neck supple  Cardiovascular:  regular rate and rhythm   Respiratory: nonlabored on room air, clear b/l  abd: soft,   BS +,   no tenderness  : no CVAT, no suprapubic tenderness, no  diaz  Musculoskeletal: right wrist swelling, no focal tenderness  Skin:    no rash  Neurologic:     no focal deficit                          8.1    11.0  )-----------( 365      ( 26 Jun 2018 07:27 )             26.0       06-26    140  |  105  |  17  ----------------------------<  85  5.1   |  26  |  1.04    Ca    9.3      26 Jun 2018 07:27    MICROBIOLOGY:  Culture - Sputum . (06.20.18 @ 00:38)  Rare Pseudomonas aeruginosa (Carbapenem Resistant)    -  Aztreonam: S 8    -  Ciprofloxacin: S <=0.5    -  Imipenem: R >8    -  Levofloxacin: S <=1    -  Meropenem: R 8    -  Ceftazidime: S 8    -  Gentamicin: S 4    -  Cefepime: S 4    -  Piperacillin/Tazobactam: I 32    -  Tobramycin: S <=2    -  Amikacin: S <=8    Culture - Fungal, Body Fluid (06.19.18 @ 00:41)    Specimen Source: .Body Fluid Interstitial Fluid    Culture Results:   Testing in progress    Culture - Body Fluid with Gram Stain (06.19.18 @ 00:41)    Gram Stain:   No polymorphonuclear cells seen  No organisms seen  by cytocentrifuge    Specimen Source: .Body Fluid Interstitial Fluid    Culture Results:   No growth    Culture - Acid Fast - Body Fluid w/Smear (06.19.18 @ 00:41)    Specimen Source: .Body Fluid Interstitial Fluid    Acid Fast Bacilli Smear:   No acid fast bacilli seen by fluorochrome stain    RADIOLOGY:  [facility] Duplex Upper Ext Vein Scan, Bilat (06.26.18 @ 11:07)   Interval examination shows a residual hematoma in the soft   tissues of the right upper arm to have decreased in size.  No DVT is identified on this examination.  Superficial thrombophlebitis affects the right and left cephalic veins.    Xray Chest 1 View- PORTABLE-Routine (06.24.18 @ 05:45)   The heart is normal in size. Small right pleural effusion platelike   atelectasis right lower lobe. The left lung is clear. Status post   sternotomy. No pneumothorax.

## 2018-06-27 DIAGNOSIS — Z94.1 HEART TRANSPLANT STATUS: ICD-10-CM

## 2018-06-27 LAB
CULTURE RESULTS: SIGNIFICANT CHANGE UP
MAGNESIUM SERPL-MCNC: 2.1 MG/DL — SIGNIFICANT CHANGE UP (ref 1.6–2.6)
SPECIMEN SOURCE: SIGNIFICANT CHANGE UP

## 2018-06-28 RX ORDER — SODIUM BICARBONATE 650 MG/1
650 TABLET ORAL 3 TIMES DAILY
Qty: 180 | Refills: 1 | Status: COMPLETED | COMMUNITY
Start: 2018-04-12 | End: 2018-06-28

## 2018-06-28 RX ORDER — HYDROCHLOROTHIAZIDE 25 MG/1
25 TABLET ORAL DAILY
Qty: 60 | Refills: 2 | Status: COMPLETED | COMMUNITY
Start: 2018-05-03 | End: 2018-06-28

## 2018-06-28 RX ORDER — AMLODIPINE BESYLATE 5 MG/1
5 TABLET ORAL DAILY
Refills: 0 | Status: COMPLETED | COMMUNITY
Start: 2018-04-19 | End: 2018-06-28

## 2018-06-29 ENCOUNTER — APPOINTMENT (OUTPATIENT)
Dept: CARDIOLOGY | Facility: CLINIC | Age: 68
End: 2018-06-29
Payer: MEDICARE

## 2018-06-29 ENCOUNTER — LABORATORY RESULT (OUTPATIENT)
Age: 68
End: 2018-06-29

## 2018-06-29 ENCOUNTER — APPOINTMENT (OUTPATIENT)
Dept: ELECTROPHYSIOLOGY | Facility: CLINIC | Age: 68
End: 2018-06-29

## 2018-06-29 VITALS
RESPIRATION RATE: 18 BRPM | OXYGEN SATURATION: 98 % | HEIGHT: 68 IN | WEIGHT: 152 LBS | DIASTOLIC BLOOD PRESSURE: 85 MMHG | TEMPERATURE: 97.5 F | HEART RATE: 118 BPM | SYSTOLIC BLOOD PRESSURE: 122 MMHG | BODY MASS INDEX: 23.04 KG/M2

## 2018-06-29 LAB
ALBUMIN SERPL ELPH-MCNC: 3.8 G/DL
ALP BLD-CCNC: 97 U/L
ALT SERPL-CCNC: 8 U/L
ANION GAP SERPL CALC-SCNC: 12 MMOL/L
AST SERPL-CCNC: 13 U/L
BASOPHILS # BLD AUTO: 0.1 K/UL
BASOPHILS NFR BLD AUTO: 1 %
BILIRUB SERPL-MCNC: 0.4 MG/DL
BUN SERPL-MCNC: 26 MG/DL
CALCIUM SERPL-MCNC: 9.6 MG/DL
CHLORIDE SERPL-SCNC: 105 MMOL/L
CO2 SERPL-SCNC: 24 MMOL/L
CREAT SERPL-MCNC: 1.18 MG/DL
EOSINOPHIL # BLD AUTO: 0.1 K/UL
EOSINOPHIL NFR BLD AUTO: 0 %
GLUCOSE SERPL-MCNC: 95 MG/DL
HCT VFR BLD CALC: 28.7 %
HGB BLD-MCNC: 9.1 G/DL
LYMPHOCYTES # BLD AUTO: 1.5 K/UL
LYMPHOCYTES NFR BLD AUTO: 6 %
MAGNESIUM SERPL-MCNC: 2.4 MG/DL
MAN DIFF?: YES
MCHC RBC-ENTMCNC: 31 PG
MCHC RBC-ENTMCNC: 31.8 GM/DL
MCV RBC AUTO: 97.3 FL
MONOCYTES # BLD AUTO: 0.8 K/UL
MONOCYTES NFR BLD AUTO: 5 %
NEUTROPHILS # BLD AUTO: 11.8 K/UL
NEUTROPHILS NFR BLD AUTO: 87 %
PLATELET # BLD AUTO: 658 K/UL
POTASSIUM SERPL-SCNC: 5.9 MMOL/L
PROT SERPL-MCNC: 7.1 G/DL
RBC # BLD: 2.95 M/UL
RBC # FLD: 20.9 %
SODIUM SERPL-SCNC: 141 MMOL/L
TACROLIMUS SERPL-MCNC: 6.3 NG/ML
WBC # FLD AUTO: 14.3 K/UL

## 2018-06-29 PROCEDURE — 99215 OFFICE O/P EST HI 40 MIN: CPT

## 2018-06-29 RX ORDER — DILTIAZEM HYDROCHLORIDE 240 MG/1
240 CAPSULE, COATED, EXTENDED RELEASE ORAL DAILY
Refills: 0 | Status: COMPLETED | COMMUNITY
Start: 2018-04-12 | End: 2018-06-29

## 2018-06-29 RX ORDER — NYSTATIN 100000 [USP'U]/ML
100000 SUSPENSION ORAL 4 TIMES DAILY
Qty: 1 | Refills: 3 | Status: COMPLETED | COMMUNITY
Start: 2018-04-12 | End: 2018-06-29

## 2018-06-29 RX ORDER — TACROLIMUS 0.5 MG/1
0.5 CAPSULE ORAL EVERY MORNING
Refills: 0 | Status: COMPLETED | COMMUNITY
Start: 2018-05-10 | End: 2018-06-29

## 2018-07-02 ENCOUNTER — APPOINTMENT (OUTPATIENT)
Dept: CARDIOLOGY | Facility: CLINIC | Age: 68
End: 2018-07-02
Payer: MEDICARE

## 2018-07-02 ENCOUNTER — OUTPATIENT (OUTPATIENT)
Dept: OUTPATIENT SERVICES | Facility: HOSPITAL | Age: 68
LOS: 1 days | End: 2018-07-02
Payer: MEDICARE

## 2018-07-02 ENCOUNTER — RESULT REVIEW (OUTPATIENT)
Age: 68
End: 2018-07-02

## 2018-07-02 ENCOUNTER — APPOINTMENT (OUTPATIENT)
Dept: CV DIAGNOSITCS | Facility: HOSPITAL | Age: 68
End: 2018-07-02

## 2018-07-02 ENCOUNTER — CLINICAL ADVICE (OUTPATIENT)
Age: 68
End: 2018-07-02

## 2018-07-02 ENCOUNTER — APPOINTMENT (OUTPATIENT)
Dept: CARDIOLOGY | Facility: CLINIC | Age: 68
End: 2018-07-02

## 2018-07-02 VITALS
RESPIRATION RATE: 18 BRPM | WEIGHT: 150 LBS | SYSTOLIC BLOOD PRESSURE: 136 MMHG | DIASTOLIC BLOOD PRESSURE: 86 MMHG | HEART RATE: 106 BPM | BODY MASS INDEX: 22.73 KG/M2 | HEIGHT: 68 IN | OXYGEN SATURATION: 97 %

## 2018-07-02 VITALS
SYSTOLIC BLOOD PRESSURE: 136 MMHG | DIASTOLIC BLOOD PRESSURE: 86 MMHG | TEMPERATURE: 98 F | RESPIRATION RATE: 15 BRPM | OXYGEN SATURATION: 97 % | WEIGHT: 151.9 LBS | HEART RATE: 106 BPM | HEIGHT: 68 IN

## 2018-07-02 DIAGNOSIS — Z98.890 OTHER SPECIFIED POSTPROCEDURAL STATES: Chronic | ICD-10-CM

## 2018-07-02 DIAGNOSIS — Z94.1 HEART TRANSPLANT STATUS: Chronic | ICD-10-CM

## 2018-07-02 DIAGNOSIS — Z94.1 HEART TRANSPLANT STATUS: ICD-10-CM

## 2018-07-02 DIAGNOSIS — Z95.811 PRESENCE OF HEART ASSIST DEVICE: Chronic | ICD-10-CM

## 2018-07-02 LAB
ALBUMIN SERPL ELPH-MCNC: 4 G/DL — SIGNIFICANT CHANGE UP (ref 3.3–5)
ALP SERPL-CCNC: 86 U/L — SIGNIFICANT CHANGE UP (ref 40–120)
ALT FLD-CCNC: 5 U/L — LOW (ref 10–45)
ANION GAP SERPL CALC-SCNC: 13 MMOL/L — SIGNIFICANT CHANGE UP (ref 5–17)
AST SERPL-CCNC: 15 U/L — SIGNIFICANT CHANGE UP (ref 10–40)
BILIRUB SERPL-MCNC: 0.4 MG/DL — SIGNIFICANT CHANGE UP (ref 0.2–1.2)
BUN SERPL-MCNC: 31 MG/DL — HIGH (ref 7–23)
CALCIUM SERPL-MCNC: 9.3 MG/DL — SIGNIFICANT CHANGE UP (ref 8.4–10.5)
CHLORIDE SERPL-SCNC: 107 MMOL/L — SIGNIFICANT CHANGE UP (ref 96–108)
CO2 SERPL-SCNC: 21 MMOL/L — LOW (ref 22–31)
CREAT SERPL-MCNC: 1.25 MG/DL — SIGNIFICANT CHANGE UP (ref 0.5–1.3)
GLUCOSE SERPL-MCNC: 89 MG/DL — SIGNIFICANT CHANGE UP (ref 70–99)
HCT VFR BLD CALC: 27.8 % — LOW (ref 39–50)
HCV RNA SERPL NAA DL=5-ACNC: NOT DETECTED IU/ML
HCV RNA SERPL NAA+PROBE-LOG IU: NOT DETECTED LOGIU/ML
HGB BLD-MCNC: 8.9 G/DL — LOW (ref 13–17)
MAGNESIUM SERPL-MCNC: 2.4 MG/DL — SIGNIFICANT CHANGE UP (ref 1.6–2.6)
MCHC RBC-ENTMCNC: 31.4 PG — SIGNIFICANT CHANGE UP (ref 27–34)
MCHC RBC-ENTMCNC: 31.9 GM/DL — LOW (ref 32–36)
MCV RBC AUTO: 98.7 FL — SIGNIFICANT CHANGE UP (ref 80–100)
PLATELET # BLD AUTO: 639 K/UL — HIGH (ref 150–400)
POTASSIUM SERPL-MCNC: 5.4 MMOL/L — HIGH (ref 3.5–5.3)
POTASSIUM SERPL-SCNC: 5.4 MMOL/L — HIGH (ref 3.5–5.3)
PROT SERPL-MCNC: 7 G/DL — SIGNIFICANT CHANGE UP (ref 6–8.3)
RBC # BLD: 2.82 M/UL — LOW (ref 4.2–5.8)
RBC # FLD: 21.6 % — HIGH (ref 10.3–14.5)
SODIUM SERPL-SCNC: 141 MMOL/L — SIGNIFICANT CHANGE UP (ref 135–145)
TACROLIMUS SERPL-MCNC: 5.9 NG/ML — SIGNIFICANT CHANGE UP
WBC # BLD: 12.3 K/UL — HIGH (ref 3.8–10.5)
WBC # FLD AUTO: 12.3 K/UL — HIGH (ref 3.8–10.5)

## 2018-07-02 PROCEDURE — 76937 US GUIDE VASCULAR ACCESS: CPT

## 2018-07-02 PROCEDURE — C1894: CPT

## 2018-07-02 PROCEDURE — 93321 DOPPLER ECHO F-UP/LMTD STD: CPT

## 2018-07-02 PROCEDURE — 88342 IMHCHEM/IMCYTCHM 1ST ANTB: CPT

## 2018-07-02 PROCEDURE — 93505 ENDOMYOCARDIAL BIOPSY: CPT

## 2018-07-02 PROCEDURE — 71046 X-RAY EXAM CHEST 2 VIEWS: CPT

## 2018-07-02 PROCEDURE — 88341 IMHCHEM/IMCYTCHM EA ADD ANTB: CPT

## 2018-07-02 PROCEDURE — 99214 OFFICE O/P EST MOD 30 MIN: CPT | Mod: 25

## 2018-07-02 PROCEDURE — 88350 IMFLUOR EA ADDL 1ANTB STN PX: CPT

## 2018-07-02 PROCEDURE — 88346 IMFLUOR 1ST 1ANTB STAIN PX: CPT | Mod: 26

## 2018-07-02 PROCEDURE — 93000 ELECTROCARDIOGRAM COMPLETE: CPT | Mod: 59

## 2018-07-02 PROCEDURE — 93308 TTE F-UP OR LMTD: CPT

## 2018-07-02 PROCEDURE — 71046 X-RAY EXAM CHEST 2 VIEWS: CPT | Mod: 26

## 2018-07-02 PROCEDURE — 88313 SPECIAL STAINS GROUP 2: CPT | Mod: 26

## 2018-07-02 PROCEDURE — 88341 IMHCHEM/IMCYTCHM EA ADD ANTB: CPT | Mod: 26

## 2018-07-02 PROCEDURE — 93505 ENDOMYOCARDIAL BIOPSY: CPT | Mod: 26

## 2018-07-02 PROCEDURE — 93010 ELECTROCARDIOGRAM REPORT: CPT

## 2018-07-02 PROCEDURE — C1817: CPT

## 2018-07-02 PROCEDURE — 88342 IMHCHEM/IMCYTCHM 1ST ANTB: CPT | Mod: 26

## 2018-07-02 PROCEDURE — 76937 US GUIDE VASCULAR ACCESS: CPT | Mod: 26

## 2018-07-02 PROCEDURE — 80197 ASSAY OF TACROLIMUS: CPT

## 2018-07-02 PROCEDURE — 88307 TISSUE EXAM BY PATHOLOGIST: CPT | Mod: 26

## 2018-07-02 PROCEDURE — 93321 DOPPLER ECHO F-UP/LMTD STD: CPT | Mod: 26

## 2018-07-02 PROCEDURE — 99153 MOD SED SAME PHYS/QHP EA: CPT

## 2018-07-02 PROCEDURE — 88313 SPECIAL STAINS GROUP 2: CPT

## 2018-07-02 PROCEDURE — C1769: CPT

## 2018-07-02 PROCEDURE — 85027 COMPLETE CBC AUTOMATED: CPT

## 2018-07-02 PROCEDURE — C1884: CPT

## 2018-07-02 PROCEDURE — 93005 ELECTROCARDIOGRAM TRACING: CPT

## 2018-07-02 PROCEDURE — 83735 ASSAY OF MAGNESIUM: CPT

## 2018-07-02 PROCEDURE — 88350 IMFLUOR EA ADDL 1ANTB STN PX: CPT | Mod: 26

## 2018-07-02 PROCEDURE — 80053 COMPREHEN METABOLIC PANEL: CPT

## 2018-07-02 PROCEDURE — 93308 TTE F-UP OR LMTD: CPT | Mod: 26

## 2018-07-02 PROCEDURE — 99152 MOD SED SAME PHYS/QHP 5/>YRS: CPT

## 2018-07-02 PROCEDURE — 88307 TISSUE EXAM BY PATHOLOGIST: CPT

## 2018-07-02 RX ORDER — MYCOPHENOLATE MOFETIL 250 MG/1
250 CAPSULE ORAL DAILY
Qty: 60 | Refills: 0 | Status: COMPLETED | COMMUNITY
Start: 2018-06-29 | End: 2018-07-02

## 2018-07-02 NOTE — H&P CARDIOLOGY - PSH
AICD (Automatic Cardioverter/Defibrillator) Present  [place] with 1 [place] lead4/1/09- explanted and replaced with [employer]optaptronic 2 leads on 9/2/09  H/O heart transplant  2/2018  History of Prior Ablation Treatment  for afib  LVAD (left ventricular assist device) present    S/P right heart catheterization  biopsy multiple  Status post left hip replacement

## 2018-07-02 NOTE — H&P CARDIOLOGY - HISTORY OF PRESENT ILLNESS
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 EF 43% His course also complicated by bilateral IJ/subclavian  thrombi, small  persistent right pleural effusion as well as acute on chronic renal failure, Donor transmitted Hepatitis C conversion, 5/23/18 admitted for hyperglycemia, hyponatremia and acute kidney injury and admitted on 6/18/18 for neutropenic fever 2/2 his immunosuppression therapy now adjusted s/p IR procedure for lung mass s/p bx essentially negative findings and continues on his Anti-fungal therapy and treated for PNA.  Vein scan of R&L upper extremities revealed no DVT with superficial Cephalic thrombophlebitis not requiring A/C.  Pt presents today for RHC for routine bx post transplant and is symptom free of cp, sob, palpitations, fevers or chills and is feeling well.      < from: Transthoracic Echocardiogram (06.13.18 @ 13:43) >  Conclusions:  1. Normal left ventricular internal dimensions and wall  thicknesses.  2. Low normal left ventricular systolic function.  Paradoxical septal motion.  3. Global Longitudinal Strain -12.1% (GEheart rate 108  bpm)  4. Right ventricular enlargement with decreased right  ventricular systolic function.  *** No prior GLS measurements for comparison.    < end of copied text >

## 2018-07-03 LAB
CMV DNA CSF QL NAA+PROBE: SIGNIFICANT CHANGE UP
CMV DNA SPEC NAA+PROBE-LOG#: SIGNIFICANT CHANGE UP LOGIU/ML

## 2018-07-06 ENCOUNTER — LABORATORY RESULT (OUTPATIENT)
Age: 68
End: 2018-07-06

## 2018-07-06 ENCOUNTER — OTHER (OUTPATIENT)
Age: 68
End: 2018-07-06

## 2018-07-06 LAB
ALBUMIN SERPL ELPH-MCNC: 3.6 G/DL
ALP BLD-CCNC: 85 U/L
ALT SERPL-CCNC: 5 U/L
ANION GAP SERPL CALC-SCNC: 14 MMOL/L
AST SERPL-CCNC: 22 U/L
BASOPHILS # BLD AUTO: 0.02 K/UL
BASOPHILS NFR BLD AUTO: 0.3 %
BILIRUB SERPL-MCNC: 0.4 MG/DL
BUN SERPL-MCNC: 25 MG/DL
CALCIUM SERPL-MCNC: 9.5 MG/DL
CHLORIDE SERPL-SCNC: 111 MMOL/L
CO2 SERPL-SCNC: 19 MMOL/L
CREAT SERPL-MCNC: 1.11 MG/DL
EOSINOPHIL # BLD AUTO: 0.05 K/UL
EOSINOPHIL NFR BLD AUTO: 0.7 %
FERRITIN SERPL-MCNC: 219 NG/ML
GLUCOSE SERPL-MCNC: 81 MG/DL
HCT VFR BLD CALC: 28.3 %
HGB BLD-MCNC: 8.7 G/DL
IMM GRANULOCYTES NFR BLD AUTO: 0.4 %
IRON SATN MFR SERPL: 13 %
IRON SERPL-MCNC: 34 UG/DL
LYMPHOCYTES # BLD AUTO: 0.85 K/UL
LYMPHOCYTES NFR BLD AUTO: 11.5 %
MAGNESIUM SERPL-MCNC: 2.3 MG/DL
MAN DIFF?: NORMAL
MCHC RBC-ENTMCNC: 29.7 PG
MCHC RBC-ENTMCNC: 30.7 GM/DL
MCV RBC AUTO: 96.6 FL
MONOCYTES # BLD AUTO: 0.25 K/UL
MONOCYTES NFR BLD AUTO: 3.4 %
NEUTROPHILS # BLD AUTO: 6.16 K/UL
NEUTROPHILS NFR BLD AUTO: 83.7 %
PLATELET # BLD AUTO: 405 K/UL
POTASSIUM SERPL-SCNC: 4.6 MMOL/L
PROT SERPL-MCNC: 6.5 G/DL
RBC # BLD: 2.93 M/UL
RBC # FLD: 22 %
SODIUM SERPL-SCNC: 144 MMOL/L
TACROLIMUS SERPL-MCNC: 13.2 NG/ML
TIBC SERPL-MCNC: 252 UG/DL
UIBC SERPL-MCNC: 218 UG/DL
WBC # FLD AUTO: 7.36 K/UL

## 2018-07-13 ENCOUNTER — LABORATORY RESULT (OUTPATIENT)
Age: 68
End: 2018-07-13

## 2018-07-13 ENCOUNTER — OUTPATIENT (OUTPATIENT)
Dept: OUTPATIENT SERVICES | Facility: HOSPITAL | Age: 68
LOS: 1 days | End: 2018-07-13
Payer: MEDICARE

## 2018-07-13 ENCOUNTER — APPOINTMENT (OUTPATIENT)
Dept: CARDIOLOGY | Facility: CLINIC | Age: 68
End: 2018-07-13
Payer: MEDICARE

## 2018-07-13 VITALS
WEIGHT: 154 LBS | TEMPERATURE: 97.52 F | BODY MASS INDEX: 23.34 KG/M2 | RESPIRATION RATE: 20 BRPM | DIASTOLIC BLOOD PRESSURE: 90 MMHG | SYSTOLIC BLOOD PRESSURE: 136 MMHG | HEIGHT: 68 IN | HEART RATE: 108 BPM

## 2018-07-13 DIAGNOSIS — B49 UNSPECIFIED MYCOSIS: ICD-10-CM

## 2018-07-13 DIAGNOSIS — Z94.1 HEART TRANSPLANT STATUS: Chronic | ICD-10-CM

## 2018-07-13 DIAGNOSIS — Z95.811 PRESENCE OF HEART ASSIST DEVICE: Chronic | ICD-10-CM

## 2018-07-13 DIAGNOSIS — Z98.890 OTHER SPECIFIED POSTPROCEDURAL STATES: Chronic | ICD-10-CM

## 2018-07-13 LAB
BASOPHILS # BLD AUTO: 0.03 K/UL
BASOPHILS NFR BLD AUTO: 0.6 %
EOSINOPHIL # BLD AUTO: 0.04 K/UL
EOSINOPHIL NFR BLD AUTO: 0.8 %
HCT VFR BLD CALC: 32.5 %
HGB BLD-MCNC: 10.1 G/DL
IMM GRANULOCYTES NFR BLD AUTO: 1 %
LYMPHOCYTES # BLD AUTO: 0.98 K/UL
LYMPHOCYTES NFR BLD AUTO: 18.6 %
MAN DIFF?: NORMAL
MCHC RBC-ENTMCNC: 29.9 PG
MCHC RBC-ENTMCNC: 31.1 GM/DL
MCV RBC AUTO: 96.2 FL
MONOCYTES # BLD AUTO: 0.3 K/UL
MONOCYTES NFR BLD AUTO: 5.7 %
NEUTROPHILS # BLD AUTO: 3.86 K/UL
NEUTROPHILS NFR BLD AUTO: 73.3 %
PLATELET # BLD AUTO: 420 K/UL
RBC # BLD: 3.38 M/UL
RBC # FLD: 21 %
TACROLIMUS SERPL-MCNC: 18.2 NG/ML
WBC # FLD AUTO: 5.26 K/UL

## 2018-07-13 PROCEDURE — 71250 CT THORAX DX C-: CPT

## 2018-07-13 PROCEDURE — 99215 OFFICE O/P EST HI 40 MIN: CPT

## 2018-07-13 PROCEDURE — 71250 CT THORAX DX C-: CPT | Mod: 26

## 2018-07-16 LAB
ALBUMIN SERPL ELPH-MCNC: 3.9 G/DL
ALP BLD-CCNC: 92 U/L
ALT SERPL-CCNC: 7 U/L
ANION GAP SERPL CALC-SCNC: 12 MMOL/L
AST SERPL-CCNC: 24 U/L
BILIRUB SERPL-MCNC: 0.3 MG/DL
BUN SERPL-MCNC: 32 MG/DL
CALCIUM SERPL-MCNC: 9.3 MG/DL
CHLORIDE SERPL-SCNC: 107 MMOL/L
CO2 SERPL-SCNC: 20 MMOL/L
CREAT SERPL-MCNC: 1.16 MG/DL
GLUCOSE SERPL-MCNC: 69 MG/DL
MAGNESIUM SERPL-MCNC: 2.5 MG/DL
POTASSIUM SERPL-SCNC: 5.5 MMOL/L
PROT SERPL-MCNC: 7 G/DL
SODIUM SERPL-SCNC: 139 MMOL/L

## 2018-07-18 LAB
ALBUMIN SERPL ELPH-MCNC: 4.2 G/DL
ALP BLD-CCNC: 97 U/L
ALT SERPL-CCNC: 8 U/L
ANION GAP SERPL CALC-SCNC: 13 MMOL/L
AST SERPL-CCNC: 20 U/L
BILIRUB SERPL-MCNC: 0.3 MG/DL
BUN SERPL-MCNC: 26 MG/DL
CALCIUM SERPL-MCNC: 9.4 MG/DL
CHLORIDE SERPL-SCNC: 106 MMOL/L
CO2 SERPL-SCNC: 20 MMOL/L
CREAT SERPL-MCNC: 1.29 MG/DL
CULTURE RESULTS: SIGNIFICANT CHANGE UP
CULTURE RESULTS: SIGNIFICANT CHANGE UP
GLUCOSE SERPL-MCNC: 76 MG/DL
MAGNESIUM SERPL-MCNC: 2.4 MG/DL
POTASSIUM SERPL-SCNC: 5.5 MMOL/L
PROT SERPL-MCNC: 7 G/DL
SODIUM SERPL-SCNC: 139 MMOL/L
SPECIMEN SOURCE: SIGNIFICANT CHANGE UP
SPECIMEN SOURCE: SIGNIFICANT CHANGE UP
TACROLIMUS SERPL-MCNC: 14.2 NG/ML

## 2018-07-19 LAB
BASOPHILS # BLD AUTO: 0.02 K/UL
BASOPHILS NFR BLD AUTO: 0.5 %
EOSINOPHIL # BLD AUTO: 0.02 K/UL
EOSINOPHIL NFR BLD AUTO: 0.5 %
HCT VFR BLD CALC: 32.2 %
HGB BLD-MCNC: 10 G/DL
IMM GRANULOCYTES NFR BLD AUTO: 1 %
LYMPHOCYTES # BLD AUTO: 0.82 K/UL
LYMPHOCYTES NFR BLD AUTO: 21.1 %
MAN DIFF?: NORMAL
MCHC RBC-ENTMCNC: 29.6 PG
MCHC RBC-ENTMCNC: 31.1 GM/DL
MCV RBC AUTO: 95.3 FL
MONOCYTES # BLD AUTO: 0.21 K/UL
MONOCYTES NFR BLD AUTO: 5.4 %
NEUTROPHILS # BLD AUTO: 2.77 K/UL
NEUTROPHILS NFR BLD AUTO: 71.5 %
PLATELET # BLD AUTO: 357 K/UL
RBC # BLD: 3.38 M/UL
RBC # FLD: 20.8 %
WBC # FLD AUTO: 3.88 K/UL

## 2018-07-24 ENCOUNTER — LABORATORY RESULT (OUTPATIENT)
Age: 68
End: 2018-07-24

## 2018-07-24 LAB
BASOPHILS # BLD AUTO: 0.01 K/UL
BASOPHILS NFR BLD AUTO: 0.3 %
EOSINOPHIL # BLD AUTO: 0.02 K/UL
EOSINOPHIL NFR BLD AUTO: 0.5 %
HCT VFR BLD CALC: 34.4 %
HGB BLD-MCNC: 10.6 G/DL
IMM GRANULOCYTES NFR BLD AUTO: 0.8 %
LYMPHOCYTES # BLD AUTO: 0.83 K/UL
LYMPHOCYTES NFR BLD AUTO: 21 %
MAN DIFF?: NORMAL
MCHC RBC-ENTMCNC: 29.5 PG
MCHC RBC-ENTMCNC: 30.8 GM/DL
MCV RBC AUTO: 95.8 FL
MONOCYTES # BLD AUTO: 0.27 K/UL
MONOCYTES NFR BLD AUTO: 6.8 %
NEUTROPHILS # BLD AUTO: 2.79 K/UL
NEUTROPHILS NFR BLD AUTO: 70.6 %
PLATELET # BLD AUTO: 358 K/UL
RBC # BLD: 3.59 M/UL
RBC # FLD: 20.9 %
TACROLIMUS SERPL-MCNC: 10.2 NG/ML
WBC # FLD AUTO: 3.95 K/UL

## 2018-07-25 ENCOUNTER — OTHER (OUTPATIENT)
Age: 68
End: 2018-07-25

## 2018-07-25 LAB
ALBUMIN SERPL ELPH-MCNC: 4.4 G/DL
ALP BLD-CCNC: 91 U/L
ALT SERPL-CCNC: 5 U/L
ANION GAP SERPL CALC-SCNC: 14 MMOL/L
AST SERPL-CCNC: 20 U/L
BILIRUB SERPL-MCNC: 0.3 MG/DL
BUN SERPL-MCNC: 29 MG/DL
CALCIUM SERPL-MCNC: 9.9 MG/DL
CHLORIDE SERPL-SCNC: 105 MMOL/L
CO2 SERPL-SCNC: 21 MMOL/L
CREAT SERPL-MCNC: 1.48 MG/DL
GLUCOSE SERPL-MCNC: 79 MG/DL
MAGNESIUM SERPL-MCNC: 2.6 MG/DL
POTASSIUM SERPL-SCNC: 6.2 MMOL/L
PROT SERPL-MCNC: 7.3 G/DL
SODIUM SERPL-SCNC: 140 MMOL/L

## 2018-07-26 ENCOUNTER — OTHER (OUTPATIENT)
Age: 68
End: 2018-07-26

## 2018-07-26 ENCOUNTER — LABORATORY RESULT (OUTPATIENT)
Age: 68
End: 2018-07-26

## 2018-07-26 LAB
ANION GAP SERPL CALC-SCNC: 13 MMOL/L
BASOPHILS # BLD AUTO: 0.01 K/UL
BASOPHILS NFR BLD AUTO: 0.2 %
BUN SERPL-MCNC: 30 MG/DL
CALCIUM SERPL-MCNC: 9.2 MG/DL
CHLORIDE SERPL-SCNC: 104 MMOL/L
CO2 SERPL-SCNC: 23 MMOL/L
CREAT SERPL-MCNC: 1.28 MG/DL
EOSINOPHIL # BLD AUTO: 0.02 K/UL
EOSINOPHIL NFR BLD AUTO: 0.5 %
GLUCOSE SERPL-MCNC: 82 MG/DL
HCT VFR BLD CALC: 33.5 %
HGB BLD-MCNC: 10.3 G/DL
LYMPHOCYTES # BLD AUTO: 0.81 K/UL
LYMPHOCYTES NFR BLD AUTO: 17.8 %
MAGNESIUM SERPL-MCNC: 2.3 MG/DL
MAN DIFF?: YES
MCHC RBC-ENTMCNC: 29.2 PG
MCHC RBC-ENTMCNC: 30.8 GM/DL
MCV RBC AUTO: 94.8 FL
MONOCYTES # BLD AUTO: 1.05 K/UL
MONOCYTES NFR BLD AUTO: 23.1 %
NEUTROPHILS # BLD AUTO: 2.64 K/UL
NEUTROPHILS NFR BLD AUTO: 58.3 %
PLATELET # BLD AUTO: 378 K/UL
POTASSIUM SERPL-SCNC: 5 MMOL/L
RBC # BLD: 3.54 M/UL
RBC # FLD: 20 %
SODIUM SERPL-SCNC: 140 MMOL/L
TACROLIMUS SERPL-MCNC: 7.8 NG/ML
WBC # FLD AUTO: 4.53 K/UL

## 2018-07-27 PROBLEM — K92.2 GASTROINTESTINAL HEMORRHAGE, UNSPECIFIED: Chronic | Status: ACTIVE | Noted: 2017-07-24

## 2018-07-27 PROBLEM — I82.629 ACUTE EMBOLISM AND THROMBOSIS OF DEEP VEINS OF UNSPECIFIED UPPER EXTREMITY: Chronic | Status: ACTIVE | Noted: 2018-04-19

## 2018-07-30 ENCOUNTER — APPOINTMENT (OUTPATIENT)
Dept: ENDOCRINOLOGY | Facility: CLINIC | Age: 68
End: 2018-07-30
Payer: MEDICARE

## 2018-07-30 VITALS
DIASTOLIC BLOOD PRESSURE: 90 MMHG | HEART RATE: 126 BPM | OXYGEN SATURATION: 98 % | HEIGHT: 68 IN | WEIGHT: 160 LBS | BODY MASS INDEX: 24.25 KG/M2 | SYSTOLIC BLOOD PRESSURE: 120 MMHG

## 2018-07-30 PROCEDURE — 99205 OFFICE O/P NEW HI 60 MIN: CPT

## 2018-07-31 RX ORDER — INSULIN GLARGINE 100 [IU]/ML
100 INJECTION, SOLUTION SUBCUTANEOUS AT BEDTIME
Qty: 100 | Refills: 0 | Status: COMPLETED | COMMUNITY
Start: 2018-07-02 | End: 2018-07-31

## 2018-07-31 RX ORDER — GLECAPREVIR AND PIBRENTASVIR 40; 100 MG/1; MG/1
100-40 TABLET, FILM COATED ORAL
Qty: 84 | Refills: 2 | Status: COMPLETED | COMMUNITY
Start: 2018-04-25 | End: 2018-07-31

## 2018-08-01 ENCOUNTER — RESULT REVIEW (OUTPATIENT)
Age: 68
End: 2018-08-01

## 2018-08-01 ENCOUNTER — OUTPATIENT (OUTPATIENT)
Dept: OUTPATIENT SERVICES | Facility: HOSPITAL | Age: 68
LOS: 1 days | End: 2018-08-01
Payer: MEDICARE

## 2018-08-01 ENCOUNTER — APPOINTMENT (OUTPATIENT)
Dept: CARDIOLOGY | Facility: CLINIC | Age: 68
End: 2018-08-01
Payer: MEDICARE

## 2018-08-01 ENCOUNTER — APPOINTMENT (OUTPATIENT)
Dept: CV DIAGNOSITCS | Facility: HOSPITAL | Age: 68
End: 2018-08-01

## 2018-08-01 VITALS
TEMPERATURE: 98 F | RESPIRATION RATE: 20 BRPM | HEIGHT: 68 IN | OXYGEN SATURATION: 100 % | WEIGHT: 169.09 LBS | SYSTOLIC BLOOD PRESSURE: 141 MMHG | HEART RATE: 113 BPM | DIASTOLIC BLOOD PRESSURE: 95 MMHG

## 2018-08-01 VITALS
TEMPERATURE: 97.9 F | OXYGEN SATURATION: 100 % | BODY MASS INDEX: 24.25 KG/M2 | SYSTOLIC BLOOD PRESSURE: 141 MMHG | HEART RATE: 113 BPM | DIASTOLIC BLOOD PRESSURE: 95 MMHG | WEIGHT: 160 LBS | HEIGHT: 68 IN | RESPIRATION RATE: 22 BRPM

## 2018-08-01 DIAGNOSIS — Z98.890 OTHER SPECIFIED POSTPROCEDURAL STATES: Chronic | ICD-10-CM

## 2018-08-01 DIAGNOSIS — Z95.811 PRESENCE OF HEART ASSIST DEVICE: Chronic | ICD-10-CM

## 2018-08-01 DIAGNOSIS — Z94.1 HEART TRANSPLANT STATUS: Chronic | ICD-10-CM

## 2018-08-01 DIAGNOSIS — Z94.1 HEART TRANSPLANT STATUS: ICD-10-CM

## 2018-08-01 LAB
ALBUMIN SERPL ELPH-MCNC: 4.1 G/DL — SIGNIFICANT CHANGE UP (ref 3.3–5)
ALP SERPL-CCNC: 88 U/L — SIGNIFICANT CHANGE UP (ref 40–120)
ALT FLD-CCNC: 10 U/L — SIGNIFICANT CHANGE UP (ref 10–45)
ANION GAP SERPL CALC-SCNC: 11 MMOL/L — SIGNIFICANT CHANGE UP (ref 5–17)
AST SERPL-CCNC: 30 U/L — SIGNIFICANT CHANGE UP (ref 10–40)
BILIRUB SERPL-MCNC: 0.4 MG/DL — SIGNIFICANT CHANGE UP (ref 0.2–1.2)
BUN SERPL-MCNC: 29 MG/DL — HIGH (ref 7–23)
CALCIUM SERPL-MCNC: 9.5 MG/DL — SIGNIFICANT CHANGE UP (ref 8.4–10.5)
CHLORIDE SERPL-SCNC: 104 MMOL/L — SIGNIFICANT CHANGE UP (ref 96–108)
CO2 SERPL-SCNC: 23 MMOL/L — SIGNIFICANT CHANGE UP (ref 22–31)
CREAT SERPL-MCNC: 1.35 MG/DL — HIGH (ref 0.5–1.3)
GLUCOSE BLDC GLUCOMTR-MCNC: 84 MG/DL — SIGNIFICANT CHANGE UP (ref 70–99)
GLUCOSE SERPL-MCNC: 73 MG/DL — SIGNIFICANT CHANGE UP (ref 70–99)
HCT VFR BLD CALC: 33.3 % — LOW (ref 39–50)
HGB BLD-MCNC: 10.5 G/DL — LOW (ref 13–17)
MAGNESIUM SERPL-MCNC: 2.6 MG/DL — SIGNIFICANT CHANGE UP (ref 1.6–2.6)
MCHC RBC-ENTMCNC: 29.5 PG — SIGNIFICANT CHANGE UP (ref 27–34)
MCHC RBC-ENTMCNC: 31.6 GM/DL — LOW (ref 32–36)
MCV RBC AUTO: 93.4 FL — SIGNIFICANT CHANGE UP (ref 80–100)
PLATELET # BLD AUTO: 372 K/UL — SIGNIFICANT CHANGE UP (ref 150–400)
POTASSIUM SERPL-MCNC: 5.6 MMOL/L — HIGH (ref 3.5–5.3)
POTASSIUM SERPL-MCNC: 5.9 MMOL/L — HIGH (ref 3.5–5.3)
POTASSIUM SERPL-SCNC: 5.6 MMOL/L — HIGH (ref 3.5–5.3)
POTASSIUM SERPL-SCNC: 5.9 MMOL/L — HIGH (ref 3.5–5.3)
PROT SERPL-MCNC: 7.4 G/DL — SIGNIFICANT CHANGE UP (ref 6–8.3)
RBC # BLD: 3.57 M/UL — LOW (ref 4.2–5.8)
RBC # FLD: 19.5 % — HIGH (ref 10.3–14.5)
SODIUM SERPL-SCNC: 138 MMOL/L — SIGNIFICANT CHANGE UP (ref 135–145)
TACROLIMUS SERPL-MCNC: 11 NG/ML — SIGNIFICANT CHANGE UP
WBC # BLD: 3.8 K/UL — SIGNIFICANT CHANGE UP (ref 3.8–10.5)
WBC # FLD AUTO: 3.8 K/UL — SIGNIFICANT CHANGE UP (ref 3.8–10.5)

## 2018-08-01 PROCEDURE — 76937 US GUIDE VASCULAR ACCESS: CPT

## 2018-08-01 PROCEDURE — 99152 MOD SED SAME PHYS/QHP 5/>YRS: CPT

## 2018-08-01 PROCEDURE — 85027 COMPLETE CBC AUTOMATED: CPT

## 2018-08-01 PROCEDURE — 80197 ASSAY OF TACROLIMUS: CPT

## 2018-08-01 PROCEDURE — C1769: CPT

## 2018-08-01 PROCEDURE — 88313 SPECIAL STAINS GROUP 2: CPT

## 2018-08-01 PROCEDURE — 88350 IMFLUOR EA ADDL 1ANTB STN PX: CPT | Mod: 26

## 2018-08-01 PROCEDURE — 93010 ELECTROCARDIOGRAM REPORT: CPT

## 2018-08-01 PROCEDURE — 80053 COMPREHEN METABOLIC PANEL: CPT

## 2018-08-01 PROCEDURE — 83735 ASSAY OF MAGNESIUM: CPT

## 2018-08-01 PROCEDURE — 88341 IMHCHEM/IMCYTCHM EA ADD ANTB: CPT

## 2018-08-01 PROCEDURE — 88342 IMHCHEM/IMCYTCHM 1ST ANTB: CPT

## 2018-08-01 PROCEDURE — 88307 TISSUE EXAM BY PATHOLOGIST: CPT | Mod: 26

## 2018-08-01 PROCEDURE — 93505 ENDOMYOCARDIAL BIOPSY: CPT

## 2018-08-01 PROCEDURE — 93662 INTRACARDIAC ECG (ICE): CPT

## 2018-08-01 PROCEDURE — 93321 DOPPLER ECHO F-UP/LMTD STD: CPT | Mod: 26

## 2018-08-01 PROCEDURE — 88350 IMFLUOR EA ADDL 1ANTB STN PX: CPT

## 2018-08-01 PROCEDURE — 93005 ELECTROCARDIOGRAM TRACING: CPT

## 2018-08-01 PROCEDURE — 88346 IMFLUOR 1ST 1ANTB STAIN PX: CPT | Mod: 26

## 2018-08-01 PROCEDURE — 93308 TTE F-UP OR LMTD: CPT | Mod: 26

## 2018-08-01 PROCEDURE — 82962 GLUCOSE BLOOD TEST: CPT

## 2018-08-01 PROCEDURE — 88307 TISSUE EXAM BY PATHOLOGIST: CPT

## 2018-08-01 PROCEDURE — 93321 DOPPLER ECHO F-UP/LMTD STD: CPT

## 2018-08-01 PROCEDURE — 93505 ENDOMYOCARDIAL BIOPSY: CPT | Mod: 26

## 2018-08-01 PROCEDURE — C1894: CPT

## 2018-08-01 PROCEDURE — 88342 IMHCHEM/IMCYTCHM 1ST ANTB: CPT | Mod: 26

## 2018-08-01 PROCEDURE — 88341 IMHCHEM/IMCYTCHM EA ADD ANTB: CPT | Mod: 26

## 2018-08-01 PROCEDURE — 84132 ASSAY OF SERUM POTASSIUM: CPT

## 2018-08-01 PROCEDURE — 88313 SPECIAL STAINS GROUP 2: CPT | Mod: 26

## 2018-08-01 PROCEDURE — C1884: CPT

## 2018-08-01 PROCEDURE — 99215 OFFICE O/P EST HI 40 MIN: CPT

## 2018-08-01 PROCEDURE — 93308 TTE F-UP OR LMTD: CPT

## 2018-08-01 RX ORDER — GLECAPREVIR AND PIBRENTASVIR 40; 100 MG/1; MG/1
3 TABLET, FILM COATED ORAL
Qty: 0 | Refills: 0 | COMMUNITY

## 2018-08-01 RX ORDER — MYCOPHENOLATE MOFETIL 250 MG/1
1 CAPSULE ORAL
Qty: 0 | Refills: 0 | COMMUNITY

## 2018-08-01 RX ORDER — TACROLIMUS 5 MG/1
2 CAPSULE ORAL
Qty: 0 | Refills: 0 | COMMUNITY

## 2018-08-01 RX ORDER — DOCUSATE SODIUM 100 MG
1 CAPSULE ORAL
Qty: 0 | Refills: 0 | COMMUNITY

## 2018-08-01 NOTE — H&P CARDIOLOGY - HISTORY OF PRESENT ILLNESS
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 EF 43% His course also complicated by bilateral IJ/subclavian  thrombi, small  persistent right pleural effusion as well as acute on chronic renal failure, Donor transmitted Hepatitis C conversion, 5/23/18 admitted for hyperglycemia, hyponatremia and acute kidney injury and admitted on 6/18/18 for neutropenic fever 2/2 his immunosuppression therapy now adjusted s/p IR procedure for lung mass s/p bx essentially negative findings and continues on his Anti-fungal therapy and treated for PNA.  Vein scan of R&L upper extremities revealed no DVT with superficial Cephalic thrombophlebitis not requiring A/C.  Pt presents today for RHC for routine bx post transplant and is symptom free of cp, sob, palpitations, fevers or chills and is feeling well.      < from: Transthoracic Echocardiogram (06.13.18 @ 13:43) >  Conclusions:  1. Normal left ventricular internal dimensions and wall  thicknesses.  2. Low normal left ventricular systolic function.  Paradoxical septal motion.  3. Global Longitudinal Strain -12.1% (GEheart rate 108  bpm)  4. Right ventricular enlargement with decreased right  ventricular systolic function.  *** No prior GLS measurements for comparison.    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 EF 43% His course also complicated by bilateral IJ/subclavian  thrombi, small  persistent right pleural effusion as well as acute on chronic renal failure, Donor transmitted Hepatitis C conversion, 5/23/18 admitted for hyperglycemia, hyponatremia and acute kidney injury and admitted on 6/18/18 for neutropenic fever 2/2 his immunosuppression therapy now adjusted s/p IR procedure for lung mass s/p bx essentially negative findings and continues on his Anti-fungal therapy and treated for PNA.  Vein scan of R&L upper extremities revealed no DVT with superficial Cephalic thrombophlebitis not requiring A/C.  Pt presents today for RHC for routine bx #11 post transplant and is symptom free of cp, sob, palpitations, fevers or chills and is feeling well.

## 2018-08-01 NOTE — H&P CARDIOLOGY - PSH
AICD (Automatic Cardioverter/Defibrillator) Present  [employer] with 1 [employer] lead4/1/09- explanted and replaced with Mbaobaotronic 2 leads on 9/2/09  H/O heart transplant  2/2018  History of Prior Ablation Treatment  for afib  LVAD (left ventricular assist device) present    S/P right heart catheterization  biopsy multiple  Status post left hip replacement

## 2018-08-03 RX ORDER — PREDNISONE 5 MG/1
5 TABLET ORAL DAILY
Refills: 0 | Status: COMPLETED | COMMUNITY
Start: 2018-05-23 | End: 2018-08-03

## 2018-08-08 LAB
CULTURE RESULTS: SIGNIFICANT CHANGE UP
SPECIMEN SOURCE: SIGNIFICANT CHANGE UP

## 2018-08-13 ENCOUNTER — TRANSCRIPTION ENCOUNTER (OUTPATIENT)
Age: 68
End: 2018-08-13

## 2018-08-14 ENCOUNTER — LABORATORY RESULT (OUTPATIENT)
Age: 68
End: 2018-08-14

## 2018-08-14 ENCOUNTER — INPATIENT (INPATIENT)
Facility: HOSPITAL | Age: 68
LOS: 14 days | Discharge: HOME | DRG: 871 | End: 2018-08-29
Attending: THORACIC SURGERY (CARDIOTHORACIC VASCULAR SURGERY) | Admitting: THORACIC SURGERY (CARDIOTHORACIC VASCULAR SURGERY)
Payer: MEDICARE

## 2018-08-14 ENCOUNTER — APPOINTMENT (OUTPATIENT)
Dept: CARDIOLOGY | Facility: CLINIC | Age: 68
End: 2018-08-14
Payer: MEDICARE

## 2018-08-14 VITALS
TEMPERATURE: 98 F | HEART RATE: 124 BPM | RESPIRATION RATE: 18 BRPM | OXYGEN SATURATION: 97 % | DIASTOLIC BLOOD PRESSURE: 69 MMHG | HEIGHT: 68 IN | SYSTOLIC BLOOD PRESSURE: 103 MMHG

## 2018-08-14 VITALS
OXYGEN SATURATION: 97 % | RESPIRATION RATE: 24 BRPM | SYSTOLIC BLOOD PRESSURE: 136 MMHG | DIASTOLIC BLOOD PRESSURE: 82 MMHG | TEMPERATURE: 102.2 F | HEART RATE: 142 BPM

## 2018-08-14 DIAGNOSIS — Z98.890 OTHER SPECIFIED POSTPROCEDURAL STATES: Chronic | ICD-10-CM

## 2018-08-14 DIAGNOSIS — N17.9 ACUTE KIDNEY FAILURE, UNSPECIFIED: ICD-10-CM

## 2018-08-14 DIAGNOSIS — I49.01 VENTRICULAR FIBRILLATION: ICD-10-CM

## 2018-08-14 DIAGNOSIS — I42.8 OTHER CARDIOMYOPATHIES: ICD-10-CM

## 2018-08-14 DIAGNOSIS — M19.039 PRIMARY OSTEOARTHRITIS, UNSPECIFIED WRIST: ICD-10-CM

## 2018-08-14 DIAGNOSIS — Z94.1 HEART TRANSPLANT STATUS: ICD-10-CM

## 2018-08-14 DIAGNOSIS — R65.10 SYSTEMIC INFLAMMATORY RESPONSE SYNDROME (SIRS) OF NON-INFECTIOUS ORIGIN WITHOUT ACUTE ORGAN DYSFUNCTION: ICD-10-CM

## 2018-08-14 DIAGNOSIS — B49 UNSPECIFIED MYCOSIS: ICD-10-CM

## 2018-08-14 DIAGNOSIS — E11.9 TYPE 2 DIABETES MELLITUS WITHOUT COMPLICATIONS: ICD-10-CM

## 2018-08-14 DIAGNOSIS — R91.8 OTHER NONSPECIFIC ABNORMAL FINDING OF LUNG FIELD: ICD-10-CM

## 2018-08-14 DIAGNOSIS — Z94.1 HEART TRANSPLANT STATUS: Chronic | ICD-10-CM

## 2018-08-14 DIAGNOSIS — Z95.811 PRESENCE OF HEART ASSIST DEVICE: Chronic | ICD-10-CM

## 2018-08-14 LAB
ALBUMIN SERPL ELPH-MCNC: 3.7 G/DL — SIGNIFICANT CHANGE UP (ref 3.3–5)
ALBUMIN SERPL ELPH-MCNC: 4 G/DL
ALP BLD-CCNC: 98 U/L
ALP SERPL-CCNC: 93 U/L — SIGNIFICANT CHANGE UP (ref 40–120)
ALT FLD-CCNC: 22 U/L — SIGNIFICANT CHANGE UP (ref 10–45)
ALT SERPL-CCNC: 17 U/L
ANION GAP SERPL CALC-SCNC: 16 MMOL/L
ANION GAP SERPL CALC-SCNC: 17 MMOL/L — SIGNIFICANT CHANGE UP (ref 5–17)
AST SERPL-CCNC: 43 U/L
AST SERPL-CCNC: 47 U/L — HIGH (ref 10–40)
BASOPHILS # BLD AUTO: 0.06 K/UL
BASOPHILS NFR BLD AUTO: 0.9 %
BILIRUB SERPL-MCNC: 0.6 MG/DL — SIGNIFICANT CHANGE UP (ref 0.2–1.2)
BILIRUB SERPL-MCNC: 0.7 MG/DL
BLD GP AB SCN SERPL QL: NEGATIVE — SIGNIFICANT CHANGE UP
BUN SERPL-MCNC: 47 MG/DL
BUN SERPL-MCNC: 51 MG/DL — HIGH (ref 7–23)
CALCIUM SERPL-MCNC: 9 MG/DL — SIGNIFICANT CHANGE UP (ref 8.4–10.5)
CALCIUM SERPL-MCNC: 9.6 MG/DL
CHLORIDE SERPL-SCNC: 100 MMOL/L
CHLORIDE SERPL-SCNC: 100 MMOL/L — SIGNIFICANT CHANGE UP (ref 96–108)
CO2 SERPL-SCNC: 18 MMOL/L — LOW (ref 22–31)
CO2 SERPL-SCNC: 19 MMOL/L
CREAT SERPL-MCNC: 1.68 MG/DL
CREAT SERPL-MCNC: 1.82 MG/DL — HIGH (ref 0.5–1.3)
CRYSTALS SNV QL MICRO: SIGNIFICANT CHANGE UP
EOSINOPHIL # BLD AUTO: 0 K/UL
EOSINOPHIL NFR BLD AUTO: 0 %
GLUCOSE BLDC GLUCOMTR-MCNC: 112 MG/DL — HIGH (ref 70–99)
GLUCOSE BLDC GLUCOMTR-MCNC: 77 MG/DL — SIGNIFICANT CHANGE UP (ref 70–99)
GLUCOSE SERPL-MCNC: 80 MG/DL — SIGNIFICANT CHANGE UP (ref 70–99)
GLUCOSE SERPL-MCNC: 92 MG/DL
GRAM STN FLD: SIGNIFICANT CHANGE UP
HBA1C MFR BLD HPLC: 5.7 %
HCT VFR BLD CALC: 27.4 % — LOW (ref 39–50)
HCT VFR BLD CALC: 31.3 %
HGB BLD-MCNC: 8.8 G/DL — LOW (ref 13–17)
HGB BLD-MCNC: 9.6 G/DL
INR BLD: 1.38 RATIO — HIGH (ref 0.88–1.16)
LDH SERPL L TO P-CCNC: 469 U/L — HIGH (ref 50–242)
LYMPHOCYTES # BLD AUTO: 0.43 K/UL
LYMPHOCYTES NFR BLD AUTO: 6.2 %
MAN DIFF?: NORMAL
MCHC RBC-ENTMCNC: 28.2 PG
MCHC RBC-ENTMCNC: 29.4 PG — SIGNIFICANT CHANGE UP (ref 27–34)
MCHC RBC-ENTMCNC: 30.7 GM/DL
MCHC RBC-ENTMCNC: 31.9 GM/DL — LOW (ref 32–36)
MCV RBC AUTO: 91.8 FL
MCV RBC AUTO: 92.1 FL — SIGNIFICANT CHANGE UP (ref 80–100)
MONOCYTES # BLD AUTO: 0.6 K/UL
MONOCYTES NFR BLD AUTO: 8.8 %
NEUTROPHILS # BLD AUTO: 5.78 K/UL
NEUTROPHILS NFR BLD AUTO: 84.1 %
PLATELET # BLD AUTO: 344 K/UL — SIGNIFICANT CHANGE UP (ref 150–400)
PLATELET # BLD AUTO: 350 K/UL
POTASSIUM SERPL-MCNC: 4.9 MMOL/L — SIGNIFICANT CHANGE UP (ref 3.5–5.3)
POTASSIUM SERPL-SCNC: 4.9 MMOL/L — SIGNIFICANT CHANGE UP (ref 3.5–5.3)
POTASSIUM SERPL-SCNC: 5.3 MMOL/L
PROT SERPL-MCNC: 7.4 G/DL — SIGNIFICANT CHANGE UP (ref 6–8.3)
PROT SERPL-MCNC: 7.5 G/DL
PROTHROM AB SERPL-ACNC: 15.1 SEC — HIGH (ref 9.8–12.7)
RAPID RVP RESULT: SIGNIFICANT CHANGE UP
RBC # BLD: 2.98 M/UL — LOW (ref 4.2–5.8)
RBC # BLD: 3.41 M/UL
RBC # FLD: 20.2 %
RBC # FLD: 20.2 % — HIGH (ref 10.3–14.5)
RH IG SCN BLD-IMP: POSITIVE — SIGNIFICANT CHANGE UP
SODIUM SERPL-SCNC: 135 MMOL/L
SODIUM SERPL-SCNC: 135 MMOL/L — SIGNIFICANT CHANGE UP (ref 135–145)
SPECIMEN SOURCE: SIGNIFICANT CHANGE UP
TACROLIMUS SERPL-MCNC: 13.8 NG/ML
TACROLIMUS SERPL-MCNC: 14.1 NG/ML — SIGNIFICANT CHANGE UP
URATE SERPL-MCNC: 7.3 MG/DL — SIGNIFICANT CHANGE UP (ref 3.4–8.8)
WBC # BLD: 7.9 K/UL — SIGNIFICANT CHANGE UP (ref 3.8–10.5)
WBC # FLD AUTO: 6.87 K/UL
WBC # FLD AUTO: 7.9 K/UL — SIGNIFICANT CHANGE UP (ref 3.8–10.5)

## 2018-08-14 PROCEDURE — 74150 CT ABDOMEN W/O CONTRAST: CPT | Mod: 26

## 2018-08-14 PROCEDURE — 71045 X-RAY EXAM CHEST 1 VIEW: CPT | Mod: 26

## 2018-08-14 PROCEDURE — 93306 TTE W/DOPPLER COMPLETE: CPT | Mod: 26

## 2018-08-14 PROCEDURE — 99223 1ST HOSP IP/OBS HIGH 75: CPT | Mod: GC

## 2018-08-14 PROCEDURE — 93010 ELECTROCARDIOGRAM REPORT: CPT

## 2018-08-14 PROCEDURE — 71250 CT THORAX DX C-: CPT | Mod: 26

## 2018-08-14 PROCEDURE — 99214 OFFICE O/P EST MOD 30 MIN: CPT | Mod: PD

## 2018-08-14 RX ORDER — ASPIRIN/CALCIUM CARB/MAGNESIUM 324 MG
81 TABLET ORAL DAILY
Qty: 0 | Refills: 0 | Status: DISCONTINUED | OUTPATIENT
Start: 2018-08-14 | End: 2018-08-29

## 2018-08-14 RX ORDER — INSULIN LISPRO 100/ML
VIAL (ML) SUBCUTANEOUS
Qty: 0 | Refills: 0 | Status: DISCONTINUED | OUTPATIENT
Start: 2018-08-14 | End: 2018-08-29

## 2018-08-14 RX ORDER — DOCUSATE SODIUM 100 MG
100 CAPSULE ORAL
Qty: 0 | Refills: 0 | Status: DISCONTINUED | OUTPATIENT
Start: 2018-08-14 | End: 2018-08-29

## 2018-08-14 RX ORDER — TACROLIMUS 5 MG/1
1 CAPSULE ORAL
Qty: 0 | Refills: 0 | Status: DISCONTINUED | OUTPATIENT
Start: 2018-08-14 | End: 2018-08-14

## 2018-08-14 RX ORDER — ACETAMINOPHEN 500 MG
500 TABLET ORAL EVERY 6 HOURS
Qty: 0 | Refills: 0 | Status: DISCONTINUED | OUTPATIENT
Start: 2018-08-14 | End: 2018-08-29

## 2018-08-14 RX ORDER — VORICONAZOLE 10 MG/ML
200 INJECTION, POWDER, LYOPHILIZED, FOR SOLUTION INTRAVENOUS EVERY 12 HOURS
Qty: 0 | Refills: 0 | Status: DISCONTINUED | OUTPATIENT
Start: 2018-08-14 | End: 2018-08-29

## 2018-08-14 RX ORDER — MAGNESIUM OXIDE 400 MG ORAL TABLET 241.3 MG
400 TABLET ORAL DAILY
Qty: 0 | Refills: 0 | Status: DISCONTINUED | OUTPATIENT
Start: 2018-08-14 | End: 2018-08-14

## 2018-08-14 RX ORDER — ATOVAQUONE 750 MG/5ML
750 SUSPENSION ORAL EVERY 12 HOURS
Qty: 0 | Refills: 0 | Status: DISCONTINUED | OUTPATIENT
Start: 2018-08-14 | End: 2018-08-14

## 2018-08-14 RX ORDER — ACETAMINOPHEN 500 MG
650 TABLET ORAL ONCE
Qty: 0 | Refills: 0 | Status: COMPLETED | OUTPATIENT
Start: 2018-08-14 | End: 2018-08-14

## 2018-08-14 RX ORDER — CEFEPIME 1 G/1
1000 INJECTION, POWDER, FOR SOLUTION INTRAMUSCULAR; INTRAVENOUS ONCE
Qty: 0 | Refills: 0 | Status: COMPLETED | OUTPATIENT
Start: 2018-08-14 | End: 2018-08-14

## 2018-08-14 RX ORDER — CEFEPIME 1 G/1
1000 INJECTION, POWDER, FOR SOLUTION INTRAMUSCULAR; INTRAVENOUS EVERY 8 HOURS
Qty: 0 | Refills: 0 | Status: DISCONTINUED | OUTPATIENT
Start: 2018-08-14 | End: 2018-08-29

## 2018-08-14 RX ORDER — MAGNESIUM OXIDE 400 MG ORAL TABLET 241.3 MG
400 TABLET ORAL
Qty: 0 | Refills: 0 | Status: DISCONTINUED | OUTPATIENT
Start: 2018-08-14 | End: 2018-08-29

## 2018-08-14 RX ORDER — TACROLIMUS 5 MG/1
0.5 CAPSULE ORAL
Qty: 0 | Refills: 0 | Status: DISCONTINUED | OUTPATIENT
Start: 2018-08-15 | End: 2018-08-29

## 2018-08-14 RX ORDER — TACROLIMUS 5 MG/1
1 CAPSULE ORAL
Qty: 0 | Refills: 0 | Status: DISCONTINUED | OUTPATIENT
Start: 2018-08-14 | End: 2018-08-15

## 2018-08-14 RX ORDER — SODIUM BICARBONATE 1 MEQ/ML
650 SYRINGE (ML) INTRAVENOUS
Qty: 0 | Refills: 0 | Status: DISCONTINUED | OUTPATIENT
Start: 2018-08-14 | End: 2018-08-29

## 2018-08-14 RX ORDER — CEFEPIME 1 G/1
INJECTION, POWDER, FOR SOLUTION INTRAMUSCULAR; INTRAVENOUS
Qty: 0 | Refills: 0 | Status: DISCONTINUED | OUTPATIENT
Start: 2018-08-14 | End: 2018-08-29

## 2018-08-14 RX ORDER — FAMOTIDINE 10 MG/ML
20 INJECTION INTRAVENOUS DAILY
Qty: 0 | Refills: 0 | Status: DISCONTINUED | OUTPATIENT
Start: 2018-08-14 | End: 2018-08-29

## 2018-08-14 RX ORDER — VANCOMYCIN HCL 1 G
750 VIAL (EA) INTRAVENOUS EVERY 12 HOURS
Qty: 0 | Refills: 0 | Status: DISCONTINUED | OUTPATIENT
Start: 2018-08-14 | End: 2018-08-21

## 2018-08-14 RX ORDER — ATORVASTATIN CALCIUM 80 MG/1
20 TABLET, FILM COATED ORAL AT BEDTIME
Qty: 0 | Refills: 0 | Status: DISCONTINUED | OUTPATIENT
Start: 2018-08-14 | End: 2018-08-16

## 2018-08-14 RX ORDER — ATOVAQUONE 750 MG/5ML
1500 SUSPENSION ORAL DAILY
Qty: 0 | Refills: 0 | Status: DISCONTINUED | OUTPATIENT
Start: 2018-08-14 | End: 2018-08-29

## 2018-08-14 RX ORDER — VALGANCICLOVIR 450 MG/1
450 TABLET, FILM COATED ORAL
Qty: 0 | Refills: 0 | Status: DISCONTINUED | OUTPATIENT
Start: 2018-08-14 | End: 2018-08-16

## 2018-08-14 RX ORDER — INSULIN LISPRO 100/ML
VIAL (ML) SUBCUTANEOUS AT BEDTIME
Qty: 0 | Refills: 0 | Status: DISCONTINUED | OUTPATIENT
Start: 2018-08-14 | End: 2018-08-29

## 2018-08-14 RX ORDER — SODIUM POLYSTYRENE SULFONATE 4.1 MEQ/G
15 POWDER, FOR SUSPENSION ORAL DAILY
Qty: 0 | Refills: 0 | Status: DISCONTINUED | OUTPATIENT
Start: 2018-08-14 | End: 2018-08-16

## 2018-08-14 RX ORDER — ATOVAQUONE 750 MG/5ML
1500 SUSPENSION ORAL DAILY
Qty: 0 | Refills: 0 | Status: DISCONTINUED | OUTPATIENT
Start: 2018-08-14 | End: 2018-08-14

## 2018-08-14 RX ADMIN — VORICONAZOLE 200 MILLIGRAM(S): 10 INJECTION, POWDER, LYOPHILIZED, FOR SOLUTION INTRAVENOUS at 20:00

## 2018-08-14 RX ADMIN — Medication 650 MILLIGRAM(S): at 19:35

## 2018-08-14 RX ADMIN — Medication 650 MILLIGRAM(S): at 20:50

## 2018-08-14 RX ADMIN — Medication 2 TABLET(S): at 19:03

## 2018-08-14 RX ADMIN — CEFEPIME 100 MILLIGRAM(S): 1 INJECTION, POWDER, FOR SOLUTION INTRAMUSCULAR; INTRAVENOUS at 19:02

## 2018-08-14 RX ADMIN — ATORVASTATIN CALCIUM 20 MILLIGRAM(S): 80 TABLET, FILM COATED ORAL at 22:04

## 2018-08-14 RX ADMIN — Medication 250 MILLIGRAM(S): at 20:00

## 2018-08-14 RX ADMIN — MAGNESIUM OXIDE 400 MG ORAL TABLET 400 MILLIGRAM(S): 241.3 TABLET ORAL at 19:07

## 2018-08-14 RX ADMIN — VALGANCICLOVIR 450 MILLIGRAM(S): 450 TABLET, FILM COATED ORAL at 20:49

## 2018-08-14 RX ADMIN — TACROLIMUS 1 MILLIGRAM(S): 5 CAPSULE ORAL at 20:48

## 2018-08-14 NOTE — PATIENT PROFILE ADULT. - NS PRO ABUSE SCREEN SUSPICION NEGLECT YN
"Chief Complaint   Patient presents with     Arthritis     RA, patient states she feels fine with the xeljanz, only hurts when the weather goes up and down, mainly then in jaw and shoulders       Initial /80 (BP Location: Left arm, Patient Position: Chair, Cuff Size: Adult Regular)  Pulse 117  Ht 1.67 m (5' 5.75\")  Wt 82.2 kg (181 lb 3.2 oz)  SpO2 95%  BMI 29.47 kg/m2 Estimated body mass index is 29.47 kg/(m^2) as calculated from the following:    Height as of this encounter: 1.67 m (5' 5.75\").    Weight as of this encounter: 82.2 kg (181 lb 3.2 oz).  BP completed using cuff size: large         RAPID3 (0-30) Cumulative Score  5.0          RAPID3 Weighted Score (divide #4 by 3 and that is the weighted score)  1.67         "
no

## 2018-08-14 NOTE — H&P ADULT - PSH
AICD (Automatic Cardioverter/Defibrillator) Present  [employer] with 1 [employer] lead4/1/09- explanted and replaced with Klipfoliotronic 2 leads on 9/2/09  H/O heart transplant  2/2018  History of Prior Ablation Treatment  for afib  LVAD (left ventricular assist device) present    S/P right heart catheterization  biopsy multiple  Status post left hip replacement AICD (Automatic Cardioverter/Defibrillator) Present  [employer] with 1 [employer] lead4/1/09- explanted and replaced with Augmentixtronic 2 leads on 9/2/09  H/O heart transplant  2/2018  History of Prior Ablation Treatment  for afib  S/P right heart catheterization  biopsy multiple  Status post left hip replacement

## 2018-08-14 NOTE — H&P ADULT - NSHPLABSRESULTS_GEN_ALL_CORE
< from: CT Chest No Cont (08.14.18 @ 16:03) >    LUNGS AND LARGE AIRWAYS: Patent central airways. Centrilobular emphysema.   A new dense parenchymal consolidation within the right upper lobe (3:64)   concerning for pneumonia. Fungal etiologies are to be considered. A 2.8   cm nodular consolidation in the left lower lobe is not significantly   changed from prior study 7/13/2018. Right middle lobe and right lower   lobe subsegmental atelectasis.  PLEURA: No pleural effusion.  VESSELS: Atherosclerotic changes. Calcifications within the left   brachiocephalic vein and SVC likely related to chronic thrombus.  HEART: Status post cardiac transplant. No pericardial effusion.  MEDIASTINUM AND DARIEN: No lymphadenopathy. No mediastinal collection.  CHEST WALL AND LOWER NECK: Sternotomy.      < end of copied text >    < from: CT Abdomen No Cont (08.14.18 @ 16:03) >    ABDOMEN:    LIVER: Within normal limits.  BILE DUCTS: Normal caliber.  GALLBLADDER: Cholelithiasis.  SPLEEN: Within normal limits.  PANCREAS: Within normal limits.  ADRENALS: Left adrenal gland thickening.  KIDNEYS/URETERS: Left renal cyst.        < end of copied text >

## 2018-08-14 NOTE — PROVIDER CONTACT NOTE (OTHER) - BACKGROUND
Heart- Transplant Patient. Readmit with increased temperature of 102, with increased HR, complain of left  quadrant pain, and bilateral LE weakness.

## 2018-08-14 NOTE — PATIENT PROFILE ADULT. - PSH
AICD (Automatic Cardioverter/Defibrillator) Present  [employer] with 1 [employer] lead4/1/09- explanted and replaced with ShoorKtr[employer] 2 leads on 9/2/09  H/O heart transplant  2/2018  History of Prior Ablation Treatment  for afib  LVAD (left ventricular assist device) present    Status post left hip replacement

## 2018-08-14 NOTE — CONSULT NOTE ADULT - SUBJECTIVE AND OBJECTIVE BOX
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 who underwent heart transplant on 2/23/18 with subsequent antibody me     Allergies    No Known Allergies    Intolerances    	    MEDICATIONS:  aspirin enteric coated 81 milliGRAM(s) Oral daily    cefepime   IVPB      cefepime   IVPB 1000 milliGRAM(s) IV Intermittent once  cefepime   IVPB 1000 milliGRAM(s) IV Intermittent every 8 hours  valGANciclovir 450 milliGRAM(s) Oral two times a day  vancomycin  IVPB 750 milliGRAM(s) IV Intermittent every 12 hours  voriconazole 200 milliGRAM(s) Oral every 12 hours      acetaminophen    Suspension 500 milliGRAM(s) Oral every 6 hours PRN    docusate sodium 100 milliGRAM(s) Oral two times a day PRN  famotidine    Tablet 20 milliGRAM(s) Oral daily    atorvastatin 20 milliGRAM(s) Oral at bedtime  insulin lispro (HumaLOG) corrective regimen sliding scale   SubCutaneous three times a day before meals  insulin lispro (HumaLOG) corrective regimen sliding scale   SubCutaneous at bedtime  predniSONE   Tablet 4 milliGRAM(s) Oral <User Schedule>    calcium carbonate 1250 mG  + Vitamin D (OsCal 500 + D) 2 Tablet(s) Oral two times a day  magnesium oxide 400 milliGRAM(s) Oral two times a day with meals  multivitamin 1 Tablet(s) Oral daily  sodium bicarbonate 650 milliGRAM(s) Oral two times a day  tacrolimus 1 milliGRAM(s) Oral <User Schedule>      PAST MEDICAL & SURGICAL HISTORY:  DVT of upper extremity (deep vein thrombosis)  Hepatitis C virus  GIB (gastrointestinal bleeding)  Ventricular fibrillation: s/p AICD  PAF (paroxysmal atrial fibrillation): on xarelto  Non-Ischemic Cardiomyopathy  SVT (Supraventricular Tachycardia)  HTN  CHF (Congestive Heart Failure)  S/P right heart catheterization: biopsy multiple  H/O heart transplant: 2/2018  Status post left hip replacement  History of Prior Ablation Treatment: for afib  AICD (Automatic Cardioverter/Defibrillator) Present: [employer] with 1 [employer] lead4/1/09- explanted and replaced with Medtronic 2 leads on 9/2/09      FAMILY HISTORY:  No pertinent family history in first degree relatives      SOCIAL HISTORY:  unchanged    REVIEW OF SYSTEMS:  CONSTITUTIONAL: No fever, weight loss, or fatigue  EYES: No eye pain, visual disturbances, or discharge  ENMT:  No difficulty hearing, tinnitus, vertigo; No sinus or throat pain  NECK: No pain or stiffness  RESPIRATORY: No cough, wheezing, chills or hemoptysis; No Shortness of Breath  CARDIOVASCULAR: No chest pain, palpitations, passing out, dizziness, or leg swelling  GASTROINTESTINAL: No abdominal or epigastric pain. No nausea, vomiting, or hematemesis; No diarrhea or constipation. No melena or hematochezia.  GENITOURINARY: No dysuria, frequency, hematuria, or incontinence  NEUROLOGICAL: No headaches, memory loss, loss of strength, numbness, or tremors  SKIN: No itching, burning, rashes, or lesions   LYMPH Nodes: No enlarged glands  ENDOCRINE: No heat or cold intolerance; No hair loss  MUSCULOSKELETAL: No joint pain or swelling; No muscle, back, or extremity pain  PSYCHIATRIC: No depression, anxiety, mood swings, or difficulty sleeping  HEME/LYMPH: No easy bruising, or bleeding gums  ALLERY AND IMMUNOLOGIC: No hives or eczema	    [ ] All others negative	  [ ] Unable to obtain    PHYSICAL EXAM:  T(C): 36.9 (08-14-18 @ 12:54), Max: 36.9 (08-14-18 @ 12:54)  HR: 124 (08-14-18 @ 12:54) (124 - 124)  BP: 103/69 (08-14-18 @ 12:54) (103/69 - 103/69)  RR: 18 (08-14-18 @ 12:54) (18 - 18)  SpO2: 97% (08-14-18 @ 12:54) (97% - 97%)  Wt(kg): --  I&O's Summary      Appearance: Normal	  HEENT:   Normal oral mucosa, PERRL, EOMI	  Lymphatic: No lymphadenopathy  Cardiovascular: Normal S1 S2, No JVD, No murmurs, No edema  Respiratory: Lungs clear to auscultation	  Psychiatry: A & O x 3, Mood & affect appropriate  Gastrointestinal:  Soft, Non-tender, + BS	  Skin: No rashes, No ecchymoses, No cyanosis	  Neurologic: Non-focal  Extremities: Normal range of motion, No clubbing, cyanosis or edema  Vascular: Peripheral pulses palpable 2+ bilaterally      LABS:	 	    CBC Full  -  ( 14 Aug 2018 17:56 )  WBC Count : 7.9 K/uL  Hemoglobin : 8.8 g/dL  Hematocrit : 27.4 %  Platelet Count - Automated : 344 K/uL  Mean Cell Volume : 92.1 fl  Mean Cell Hemoglobin : 29.4 pg  Mean Cell Hemoglobin Concentration : 31.9 gm/dL  Auto Neutrophil # : x  Auto Lymphocyte # : x  Auto Monocyte # : x  Auto Eosinophil # : x  Auto Basophil # : x  Auto Neutrophil % : x  Auto Lymphocyte % : x  Auto Monocyte % : x  Auto Eosinophil % : x  Auto Basophil % : x    08-14    135  |  100  |  51<H>  ----------------------------<  80  4.9   |  18<L>  |  1.82<H>    Ca    9.0      14 Aug 2018 17:56    TPro  7.4  /  Alb  3.7  /  TBili  0.6  /  DBili  x   /  AST  47<H>  /  ALT  22  /  AlkPhos  93  08-14 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 who underwent heart transplant on 2/23/18 (CMV D-/R+ and Toxo D-/R+, Hep C+ heart) with post op 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 His course was further complicated by neutropenic fevers 6/2018 for which he was treated with broad spectrum abx and antifungals - thought to be secondary to a fungal pna who remains on voriconazole with h/o colonized CRE (pseudomonas).      Today pt presented for his scheduled outpt appointment who was noted to be febrile to 102. He admits to having a cough for the past 3 days productive of white sputum. He denies subjective fevers or chills. No recent travel. No diarrhea, night sweats, dysuria, rash, bug bites. He denies CP, SOB, palpitations, LE edema, orthopnea, PND.     Allergies    No Known Allergies  Intolerances    	    MEDICATIONS:  aspirin enteric coated 81 milliGRAM(s) Oral daily    cefepime   IVPB      cefepime   IVPB 1000 milliGRAM(s) IV Intermittent once  cefepime   IVPB 1000 milliGRAM(s) IV Intermittent every 8 hours  valGANciclovir 450 milliGRAM(s) Oral two times a day  vancomycin  IVPB 750 milliGRAM(s) IV Intermittent every 12 hours  voriconazole 200 milliGRAM(s) Oral every 12 hours      acetaminophen    Suspension 500 milliGRAM(s) Oral every 6 hours PRN    docusate sodium 100 milliGRAM(s) Oral two times a day PRN  famotidine    Tablet 20 milliGRAM(s) Oral daily    atorvastatin 20 milliGRAM(s) Oral at bedtime  insulin lispro (HumaLOG) corrective regimen sliding scale   SubCutaneous three times a day before meals  insulin lispro (HumaLOG) corrective regimen sliding scale   SubCutaneous at bedtime  predniSONE   Tablet 4 milliGRAM(s) Oral <User Schedule>    calcium carbonate 1250 mG  + Vitamin D (OsCal 500 + D) 2 Tablet(s) Oral two times a day  magnesium oxide 400 milliGRAM(s) Oral two times a day with meals  multivitamin 1 Tablet(s) Oral daily  sodium bicarbonate 650 milliGRAM(s) Oral two times a day  tacrolimus 1 milliGRAM(s) Oral <User Schedule>      PAST MEDICAL & SURGICAL HISTORY:  DVT of upper extremity (deep vein thrombosis)  Hepatitis C virus  GIB (gastrointestinal bleeding)  Ventricular fibrillation: s/p AICD  PAF (paroxysmal atrial fibrillation): on xarelto  Non-Ischemic Cardiomyopathy  SVT (Supraventricular Tachycardia)  HTN  CHF (Congestive Heart Failure)  S/P right heart catheterization: biopsy multiple  H/O heart transplant: 2/2018  Status post left hip replacement  History of Prior Ablation Treatment: for afib  AICD (Automatic Cardioverter/Defibrillator) Present: [employer] with 1 [employer] lead4/1/09- explanted and replaced with Medtronic 2 leads on 9/2/09      FAMILY HISTORY:  non-contributory       SOCIAL HISTORY:    Non-smoker     REVIEW OF SYSTEMS:  CONSTITUTIONAL: No fever, weight loss, + fatigue  EYES: No vision changes   ENMT:  No sinus or throat pain  NECK: No pain or stiffness  RESPIRATORY: +cough No wheezing, chills or hemoptysis; No Shortness of Breath  CARDIOVASCULAR: No chest pain, palpitations  GASTROINTESTINAL: No abdominal or epigastric pain. No nausea, vomiting; No diarrhea  GENITOURINARY: No dysuria, frequency  NEUROLOGICAL: No headaches, loss of strength  SKIN: No itching, burning, rashes, or lesions   LYMPH Nodes: No enlarged glands  ENDOCRINE: No heat or cold intolerance; No hair loss  MUSCULOSKELETAL: + Joint swelling of R wrist No muscle, back, or extremity pain  PSYCHIATRIC: No depression, anxiety  HEME/LYMPH: No easy bruising, or bleeding gums  	    [ ] All others negative	  [ ] Unable to obtain    PHYSICAL EXAM:  T(C): 36.9 (08-14-18 @ 12:54), Max: 36.9 (08-14-18 @ 12:54)  HR: 124 (08-14-18 @ 12:54) (124 - 124)  BP: 103/69 (08-14-18 @ 12:54) (103/69 - 103/69)  RR: 18 (08-14-18 @ 12:54) (18 - 18)  SpO2: 97% (08-14-18 @ 12:54) (97% - 97%)  Wt(kg): --  I&O's Summary      Appearance: Normal, NAD   HEENT:   Normal oral mucosa, PERRL, EOMI	  Lymphatic: No lymphadenopathy  Cardiovascular: Normal S1 S2, No JVD, No murmurs, No edema  Respiratory: Decreased LLL   Psychiatry: A & O x 3, Mood & affect appropriate  Gastrointestinal:  Soft, Non-tender, + BS	  Skin: No rashes, No ecchymoses, No cyanosis	  Neurologic: Non-focal  R wrist swollen, min tenderness, mild warmth, no erythema  Index finger of right hand with dry healing ulceration   Extremities: Normal range of motion, No clubbing, cyanosis or edema  Vascular: Peripheral pulses palpable 2+ bilaterally      LABS:	 	    CBC Full  -  ( 14 Aug 2018 17:56 )  WBC Count : 7.9 K/uL  Hemoglobin : 8.8 g/dL  Hematocrit : 27.4 %  Platelet Count - Automated : 344 K/uL  Mean Cell Volume : 92.1 fl  Mean Cell Hemoglobin : 29.4 pg  Mean Cell Hemoglobin Concentration : 31.9 gm/dL  Auto Neutrophil # : x  Auto Lymphocyte # : x  Auto Monocyte # : x  Auto Eosinophil # : x  Auto Basophil # : x  Auto Neutrophil % : x  Auto Lymphocyte % : x  Auto Monocyte % : x  Auto Eosinophil % : x  Auto Basophil % : x    08-14    135  |  100  |  51<H>  ----------------------------<  80  4.9   |  18<L>  |  1.82<H>    Ca    9.0      14 Aug 2018 17:56    TPro  7.4  /  Alb  3.7  /  TBili  0.6  /  DBili  x   /  AST  47<H>  /  ALT  22  /  AlkPhos  93  08-14 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 who underwent heart transplant on 2/23/18 (CMV D-/R+ and Toxo D-/R+, Hep C+ heart) with post op 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 His course was further complicated by neutropenic fevers 6/2018 for which he was treated with broad spectrum abx and antifungals - thought to be secondary to a fungal pna who remains on voriconazole with h/o colonized CRE (pseudomonas).      Today pt presented for his scheduled outpt appointment who was noted to be febrile to 102. He admits to having a cough for the past 3 days productive of white sputum. He denies subjective fevers or chills. No recent travel. No diarrhea, night sweats, dysuria, rash, bug bites. He denies CP, SOB, palpitations, LE edema, orthopnea, PND.     Allergies    No Known Allergies  Intolerances    	    MEDICATIONS:  aspirin enteric coated 81 milliGRAM(s) Oral daily    cefepime   IVPB      cefepime   IVPB 1000 milliGRAM(s) IV Intermittent once  cefepime   IVPB 1000 milliGRAM(s) IV Intermittent every 8 hours  valGANciclovir 450 milliGRAM(s) Oral two times a day  vancomycin  IVPB 750 milliGRAM(s) IV Intermittent every 12 hours  voriconazole 200 milliGRAM(s) Oral every 12 hours      acetaminophen    Suspension 500 milliGRAM(s) Oral every 6 hours PRN    docusate sodium 100 milliGRAM(s) Oral two times a day PRN  famotidine    Tablet 20 milliGRAM(s) Oral daily    atorvastatin 20 milliGRAM(s) Oral at bedtime  insulin lispro (HumaLOG) corrective regimen sliding scale   SubCutaneous three times a day before meals  insulin lispro (HumaLOG) corrective regimen sliding scale   SubCutaneous at bedtime  predniSONE   Tablet 4 milliGRAM(s) Oral <User Schedule>    calcium carbonate 1250 mG  + Vitamin D (OsCal 500 + D) 2 Tablet(s) Oral two times a day  magnesium oxide 400 milliGRAM(s) Oral two times a day with meals  multivitamin 1 Tablet(s) Oral daily  sodium bicarbonate 650 milliGRAM(s) Oral two times a day  tacrolimus 1 milliGRAM(s) Oral <User Schedule>      PAST MEDICAL & SURGICAL HISTORY:  DVT of upper extremity (deep vein thrombosis)  Hepatitis C virus  GIB (gastrointestinal bleeding)  Ventricular fibrillation: s/p AICD  PAF (paroxysmal atrial fibrillation): on xarelto  Non-Ischemic Cardiomyopathy  SVT (Supraventricular Tachycardia)  HTN  CHF (Congestive Heart Failure)  S/P right heart catheterization: biopsy multiple  H/O heart transplant: 2/2018  Status post left hip replacement  History of Prior Ablation Treatment: for afib  AICD (Automatic Cardioverter/Defibrillator) Present: [employer] with 1 [employer] lead4/1/09- explanted and replaced with Medtronic 2 leads on 9/2/09      FAMILY HISTORY:  non-contributory       SOCIAL HISTORY:    Non-smoker     REVIEW OF SYSTEMS:  CONSTITUTIONAL: No fever, weight loss, + fatigue  EYES: No vision changes   ENMT:  No sinus or throat pain  NECK: No pain or stiffness  RESPIRATORY: +cough No wheezing, chills or hemoptysis; No Shortness of Breath  CARDIOVASCULAR: No chest pain, palpitations  GASTROINTESTINAL: No abdominal or epigastric pain. No nausea, vomiting; No diarrhea  GENITOURINARY: No dysuria, frequency  NEUROLOGICAL: No headaches, loss of strength  SKIN: No itching, burning, rashes, or lesions   LYMPH Nodes: No enlarged glands  ENDOCRINE: No heat or cold intolerance; No hair loss  MUSCULOSKELETAL: + Joint swelling of R wrist No muscle, back, or extremity pain  PSYCHIATRIC: No depression, anxiety  HEME/LYMPH: No easy bruising, or bleeding gums  	    [ ] All others negative	  [ ] Unable to obtain    PHYSICAL EXAM:  T(C): 36.9 (08-14-18 @ 12:54), Max: 36.9 (08-14-18 @ 12:54)  HR: 124 (08-14-18 @ 12:54) (124 - 124)  BP: 103/69 (08-14-18 @ 12:54) (103/69 - 103/69)  RR: 18 (08-14-18 @ 12:54) (18 - 18)  SpO2: 97% (08-14-18 @ 12:54) (97% - 97%)  Wt(kg): --  I&O's Summary      Appearance: Normal, NAD   HEENT:   Normal oral mucosa, PERRL, EOMI	  Lymphatic: No lymphadenopathy  Cardiovascular: Normal S1 S2, No JVD, No murmurs, No edema  Respiratory: Decreased LLL   Psychiatry: A & O x 3, Mood & affect appropriate  Gastrointestinal:  Soft, Non-tender, + BS	  Skin: No rashes, No ecchymoses, No cyanosis	  Neurologic: Non-focal  R wrist swollen, min tenderness, mild warmth, no erythema  Index finger of right hand with dry necrotic digit tip   Extremities: Normal range of motion, No clubbing, cyanosis or edema  Vascular: Peripheral pulses palpable 2+ bilaterally      LABS:	 	    CBC Full  -  ( 14 Aug 2018 17:56 )  WBC Count : 7.9 K/uL  Hemoglobin : 8.8 g/dL  Hematocrit : 27.4 %  Platelet Count - Automated : 344 K/uL  Mean Cell Volume : 92.1 fl  Mean Cell Hemoglobin : 29.4 pg  Mean Cell Hemoglobin Concentration : 31.9 gm/dL  Auto Neutrophil # : x  Auto Lymphocyte # : x  Auto Monocyte # : x  Auto Eosinophil # : x  Auto Basophil # : x  Auto Neutrophil % : x  Auto Lymphocyte % : x  Auto Monocyte % : x  Auto Eosinophil % : x  Auto Basophil % : x    08-14    135  |  100  |  51<H>  ----------------------------<  80  4.9   |  18<L>  |  1.82<H>    Ca    9.0      14 Aug 2018 17:56    TPro  7.4  /  Alb  3.7  /  TBili  0.6  /  DBili  x   /  AST  47<H>  /  ALT  22  /  AlkPhos  93  08-14

## 2018-08-14 NOTE — CONSULT NOTE ADULT - ASSESSMENT
67 yo M s/p heart transplant complicated by rejection with rounds of plasmaphoresis , IVIG, rituximab x2, known colinizer with  Pseudomonas last treated for PNA in June 2018 with cefepime and voriconazole now presents with fever, decreased PO intake, productive cough. Ddx include recurrent pneumonia vs gout flare  vs other infectious process.     Suggest:  -CT chest/abd/pelvis without contrast   -send 2 sets of blood cultures  -send sputum culture  -send urine culture  -send urine legionella ag  -send fungitell  -send Aspergillosis Galactomannan  -send CMV and EBV  -Depending results on CT may start cefepime  -Urice acid, Serum  -Xray of right wrist   -Orthopedics consult for wrist tap (ideally before starting antibiotics)   -continue Valganciclovir  -continue Voriconazole 67 yo M s/p heart transplant complicated by rejection with rounds of plasmaphoresis , IVIG, rituximab x2, known colinizer with  Pseudomonas last treated for PNA in June 2018 with cefepime and voriconazole now presents with fever, decreased PO intake, productive cough. Ddx include recurrent pneumonia vs gout flare  vs other infectious process.     Suggest:  -CT chest/abd/pelvis without contrast showing new right lobe pneumonia with residual left sided infiltrate   -Start cefepime 1g q8hrs (administer 1st dose after arthrocentesis)   -Start vancomycin 750mg q12hrs with daily troughs (administer 1st dose after arthrocentesis)   -send 2 sets of blood cultures  -send sputum culture  -send urine culture  -send urine legionella ag  -send fungitell  -send Aspergillosis Galactomannan  -send CMV and EBV  -Urice acid, Serum  -Xray of right wrist   -Orthopedics consult for wrist tap (ideally before starting antibiotics)   -continue Valganciclovir  -continue Voriconazole

## 2018-08-14 NOTE — CONSULT NOTE ADULT - SUBJECTIVE AND OBJECTIVE BOX
Patient is a 68y old  Male who presents with a chief complaint of S/P heart transplant presents w/ temp 102 (14 Aug 2018 13:44)      HPI: Mr. Baez is a 68 year old man with past medical history of a nonischemic cardiomyopathy and chronic systolic heart failure s/p HM2 LVAD in June 2017 complicated by recurrent GI hemorrhage and possible pump thrombosis who underwent heart transplant on 2/23/18.     His post-operative course was complicated by graft dysfunction of unclear etiology and persistent C4d positive staining on endomyocardial biopsy. This was initially thought to be immune-mediated due to positive B-cell flow cross match (negative CDC cross match) and he received plasmapheresis with IVIg x 2 with some improvement in LV function. His echocardiogram on 4/3 showed evidence of slightly worsened left ventricular strain, LVEF of 52% and slightly diminished RV systolic function. It was determined he missed five doses of prednisone. RHC on 4/4 showed elevated biventricular filling pressures with normal cardiac output. Endomyocardial biopsy at that time showed 1R/1A cellular rejection. C4D continued to be diffusely positive. DSA was unremarkable. He was discharged home on 4/10. Following discharge, his last RHC showed persistently elevated filling pressures and worsening graft dysfunction. As a result, he was admitted and received three cycles of plasmapheresis followed by IVIG. Subsequent to that he received two doses of rituximab.     His course was also complicated by bilateral IJ/subclavian thrombi, a persistent small right sided pleural effusion, as well as acute on chronic renal failure of unclear etiology.     His last admission was in June found to have a bacterial vs fungal pneumonia and remains on vori.    He presented to clinic today after routine blood work, stating he is not feeling well this morning, feels weak, no chills, no reported fever, denies chest pain or sob. + productive cough, clear sputum which has worsened the past few days. Also complains of decreased PO intake for the last few days.  Denies nausea, vomiting or diarrhea. Temp in clinic was 102      PAST MEDICAL & SURGICAL HISTORY:  DVT of upper extremity (deep vein thrombosis)  Hepatitis C virus  GIB (gastrointestinal bleeding)  Ventricular fibrillation: s/p AICD  PAF (paroxysmal atrial fibrillation): on xarelto  Non-Ischemic Cardiomyopathy  SVT (Supraventricular Tachycardia)  HTN  CHF (Congestive Heart Failure)  S/P right heart catheterization: biopsy multiple  H/O heart transplant: 2/2018  LVAD (left ventricular assist device) present  Status post left hip replacement  History of Prior Ablation Treatment: for afib  AICD (Automatic Cardioverter/Defibrillator) Present: [employer] with 1 [employer] lead4/1/09- explanted and replaced with [employer] 2 leads on 9/2/09      Allergies  No Known Allergies        ANTIMICROBIALS:  valGANciclovir 450 two times a day  voriconazole 200 every 12 hours      OTHER MEDS: MEDICATIONS  (STANDING):  acetaminophen    Suspension 500 every 6 hours PRN  aspirin enteric coated 81 daily  atorvastatin 20 at bedtime  docusate sodium 100 two times a day PRN  famotidine    Tablet 20 daily  insulin lispro (HumaLOG) corrective regimen sliding scale  three times a day before meals  insulin lispro (HumaLOG) corrective regimen sliding scale  at bedtime  predniSONE   Tablet 4 <User Schedule>  tacrolimus 1 <User Schedule>      SOCIAL HISTORY:       FAMILY HISTORY:  No pertinent family history in first degree relatives      REVIEW OF SYSTEMS  [  ] All other systems negative except as noted below:	    Constitutional:  [ ] fever [ ] chills  [ ] weight loss  [ ] weakness  Skin:  [ ] rash [ ] phlebitis	  Eyes: [ ] icterus [ ] pain  [ ] discharge	  ENMT: [ ] sore throat  [ ] thrush [ ] ulcers [ ] exudates  Respiratory: [ ] dyspnea [ ] hemoptysis [x ] cough [x ] sputum	  Cardiovascular:  [ ] chest pain [ ] palpitations [ ] edema	  Gastrointestinal:  [ ] nausea [ ] vomiting [ ] diarrhea [ ] constipation [x ] pain	  Genitourinary:  [ ] dysuria [ ] frequency [ ] hematuria [ ] discharge [ ] flank pain  [ ] incontinence  Musculoskeletal:  [ ] myalgias [ ] arthralgias [x ] arthritis  [ ] back pain  Neurological:  [ ] headache [ ] seizures  [ ] confusion/altered mental status  Psychiatric:  [ ] anxiety [ ] depression	  Hematology/Lymphatics:  [ ] lymphadenopathy  Endocrine:  [ ] adrenal [ ] thyroid  Allergic/Immunologic:	 [ ] transplant [ ] seasonal    Vital Signs Last 24 Hrs  T(F): 98.4 (08-14-18 @ 12:54), Max: 98.4 (08-14-18 @ 12:54)    Vital Signs Last 24 Hrs  HR: 124 (08-14-18 @ 12:54) (124 - 124)  BP: 103/69 (08-14-18 @ 12:54) (103/69 - 103/69)  RR: 18 (08-14-18 @ 12:54)  SpO2: 97% (08-14-18 @ 12:54) (97% - 97%)  Wt(kg): --    PHYSICAL EXAM:  General: non-toxic  HEAD/EYES: anicteric, PERRL  ENT:  supple  Cardiovascular:   S1, S2, clean well healed sternotomy scar   Respiratory:  clear bilaterally  GI:  soft, non-tender, normal bowel sounds  :  no CVA tenderness   Musculoskeletal:  Right wrist is warm, boggy, fluctuant and mildly tender to palpation   Neurologic:  grossly non-focal  Skin:  no rash  Lymph: no lymphadenopathy  Psychiatric:  appropriate affect  Vascular:  no phlebitis      MICROBIOLOGY:      Toxoplasma IgG Screen: 4.7 IU/mL (06-14-18 @ 09:42)  HIV-1 RNA Quantitative, Viral Load Log: NOT DET. lg /mL (05-31-18 @ 19:10)  CMV IgG Antibody: 5.40 U/mL (05-25-18 @ 17:51)  CMV IgG Antibody: >10.00 U/mL (02-22-18 @ 23:45)  HIV-1 RNA Quantitative, Viral Load Log: NOT DET. lg /mL (02-02-18 @ 19:25)      RADIOLOGY: < from: CT Chest No Cont (07.13.18 @ 08:44) >  IMPRESSION:   Left lower lobe thick linear opacity in place of prior left lower lobe   peripheral consolidation. Right lower lobe thick linear opacity slightly   improved compared to the prior study.     Right middle lobe consolidation and volume loss increased compared to the   prior study and may representatelectasis versus infection.    3 month follow-up CT recommended.    < end of copied text >    imaging below personally reviewed Patient is a 68y old  Male who presents with a chief complaint of S/P heart transplant presents w/ temp 102 (14 Aug 2018 13:44)      HPI: Mr. Baez is a 68 year old man with past medical history of a nonischemic cardiomyopathy and chronic systolic heart failure s/p HM2 LVAD in June 2017 complicated by recurrent GI hemorrhage and possible pump thrombosis who underwent heart transplant on 2/23/18.     His post-operative course was complicated by graft dysfunction of unclear etiology and persistent C4d positive staining on endomyocardial biopsy. This was initially thought to be immune-mediated due to positive B-cell flow cross match (negative CDC cross match) and he received plasmapheresis with IVIg x 2 with some improvement in LV function. His echocardiogram on 4/3 showed evidence of slightly worsened left ventricular strain, LVEF of 52% and slightly diminished RV systolic function. It was determined he missed five doses of prednisone. RHC on 4/4 showed elevated biventricular filling pressures with normal cardiac output. Endomyocardial biopsy at that time showed 1R/1A cellular rejection. C4D continued to be diffusely positive. DSA was unremarkable. He was discharged home on 4/10. Following discharge, his last RHC showed persistently elevated filling pressures and worsening graft dysfunction. As a result, he was admitted and received three cycles of plasmapheresis followed by IVIG. Subsequent to that he received two doses of rituximab.     His course was also complicated by bilateral IJ/subclavian thrombi, a persistent small right sided pleural effusion, as well as acute on chronic renal failure of unclear etiology.     His last admission was in June found to have a bacterial vs fungal pneumonia and remains on vori.    He presented to clinic today after routine blood work, stating he is not feeling well this morning, feels weak, no chills, no reported fever, denies chest pain or sob. + productive cough, clear sputum which has worsened the past few days. Also complains of decreased PO intake for the last few days.  Denies nausea, vomiting or diarrhea. Temp in clinic was 102      PAST MEDICAL & SURGICAL HISTORY:  DVT of upper extremity (deep vein thrombosis)  Hepatitis C virus  GIB (gastrointestinal bleeding)  Ventricular fibrillation: s/p AICD  PAF (paroxysmal atrial fibrillation): on xarelto  Non-Ischemic Cardiomyopathy  SVT (Supraventricular Tachycardia)  HTN  CHF (Congestive Heart Failure)  S/P right heart catheterization: biopsy multiple  H/O heart transplant: 2/2018  LVAD (left ventricular assist device) present  Status post left hip replacement  History of Prior Ablation Treatment: for afib  AICD (Automatic Cardioverter/Defibrillator) Present: [employer] with 1 [employer] lead4/1/09- explanted and replaced with [employer] 2 leads on 9/2/09      Allergies  No Known Allergies        ANTIMICROBIALS:  valGANciclovir 450 two times a day  voriconazole 200 every 12 hours      OTHER MEDS: MEDICATIONS  (STANDING):  acetaminophen    Suspension 500 every 6 hours PRN  aspirin enteric coated 81 daily  atorvastatin 20 at bedtime  docusate sodium 100 two times a day PRN  famotidine    Tablet 20 daily  insulin lispro (HumaLOG) corrective regimen sliding scale  three times a day before meals  insulin lispro (HumaLOG) corrective regimen sliding scale  at bedtime  predniSONE   Tablet 4 <User Schedule>  tacrolimus 1 <User Schedule>      SOCIAL HISTORY:       FAMILY HISTORY:  No pertinent family history in first degree relatives      REVIEW OF SYSTEMS  [  ] All other systems negative except as noted below:	    Constitutional:  [ ] fever [ ] chills  [ ] weight loss  [ ] weakness  Skin:  [ ] rash [ ] phlebitis	  Eyes: [ ] icterus [ ] pain  [ ] discharge	  ENMT: [ ] sore throat  [ ] thrush [ ] ulcers [ ] exudates  Respiratory: [ ] dyspnea [ ] hemoptysis [x ] cough [x ] sputum	  Cardiovascular:  [ ] chest pain [ ] palpitations [ ] edema	  Gastrointestinal:  [ ] nausea [ ] vomiting [ ] diarrhea [ ] constipation [x ] pain	  Genitourinary:  [ ] dysuria [ ] frequency [ ] hematuria [ ] discharge [ ] flank pain  [ ] incontinence  Musculoskeletal:  [ ] myalgias [ ] arthralgias [x ] arthritis  [ ] back pain  Neurological:  [ ] headache [ ] seizures  [ ] confusion/altered mental status  Psychiatric:  [ ] anxiety [ ] depression	  Hematology/Lymphatics:  [ ] lymphadenopathy  Endocrine:  [ ] adrenal [ ] thyroid  Allergic/Immunologic:	 [ ] transplant [ ] seasonal    Vital Signs Last 24 Hrs  T(F): 98.4 (08-14-18 @ 12:54), Max: 98.4 (08-14-18 @ 12:54)    Vital Signs Last 24 Hrs  HR: 124 (08-14-18 @ 12:54) (124 - 124)  BP: 103/69 (08-14-18 @ 12:54) (103/69 - 103/69)  RR: 18 (08-14-18 @ 12:54)  SpO2: 97% (08-14-18 @ 12:54) (97% - 97%)  Wt(kg): --    PHYSICAL EXAM:  General: non-toxic  HEAD/EYES: anicteric, PERRL  ENT:  supple  Cardiovascular:   S1, S2, clean well healed sternotomy scar   Respiratory:  clear bilaterally  GI:  soft, non-tender, normal bowel sounds  :  no CVA tenderness   Musculoskeletal:  Right wrist is warm, boggy, fluctuant and mildly tender to palpation   Neurologic:  grossly non-focal  Skin:  no rash  Lymph: no lymphadenopathy  Psychiatric:  appropriate affect  Vascular:  no phlebitis      MICROBIOLOGY:      Toxoplasma IgG Screen: 4.7 IU/mL (06-14-18 @ 09:42)  HIV-1 RNA Quantitative, Viral Load Log: NOT DET. lg /mL (05-31-18 @ 19:10)  CMV IgG Antibody: 5.40 U/mL (05-25-18 @ 17:51)  CMV IgG Antibody: >10.00 U/mL (02-22-18 @ 23:45)  HIV-1 RNA Quantitative, Viral Load Log: NOT DET. lg /mL (02-02-18 @ 19:25)      RADIOLOGY: < from: CT Chest No Cont (07.13.18 @ 08:44) >  IMPRESSION:   Left lower lobe thick linear opacity in place of prior left lower lobe   peripheral consolidation. Right lower lobe thick linear opacity slightly   improved compared to the prior study.     Right middle lobe consolidation and volume loss increased compared to the   prior study and may representatelectasis versus infection.    3 month follow-up CT recommended.    < end of copied text >    imaging below personally reviewed    < from: CT Chest No Cont (08.14.18 @ 16:03) >    New right upper lobe consolidation consistent withpneumonia. Consider   fungal etiologies.     A left lower lobe nodular opacity is not significantly changed from prior   imaging 7/13/2018.    No focal collections in the chest or abdomen.      < end of copied text >

## 2018-08-14 NOTE — CONSULT NOTE ADULT - ASSESSMENT
68 year old man PMH NICM HFrEF s/p HM2 LVAD 6/2017, who underwent heart transplant on 2/23/18 (CMV D-/R+ and Toxo D-/R+, Hep C+ heart) with post op graft dysfunction who underwent plasmapheresis and IVIG x2 as well as rituximab, with suspected fungal PNA diagnosed 6/2018 on voriconazole who presented with fevers from outpt clinic appointment - most likely secondary to recurrent PNA based on hx new cough x 3 days and RUL infiltrate on CT scan.

## 2018-08-14 NOTE — CONSULT NOTE ADULT - PROBLEM SELECTOR RECOMMENDATION 9
will cont vanc, voriconazole and cefepime per ID  Pt is at risk for atypical organisms as well   f/u BcXs, UA/UCx, fungitel, aspergilosus galactomannon, CMV/EBV PCR, quant gold, U legionella, U histo ag   plan for bronchoscopy in am   arthrocentesis of R wrist with synovial fluid cell count, culture and crystal eval   above plan discussed with CTSx NP, plastics hand, ID

## 2018-08-14 NOTE — H&P ADULT - HISTORY OF PRESENT ILLNESS
68 year old man with past medical history of a nonischemic cardiomyopathy and chronic systolic heart failure s/p HM2 LVAD in June 2017 complicated by recurrent GI hemorrhage and possible pump thrombosis who underwent heart transplant on 2/23/18. >presented to clinic today after routine blood work, stating he is not feeling well this morning, feels weak, no chills, no reported fever, denies chest pain or sob. + productive cough, clear sputum which has worsened the past few days. Also complains of decreased PO intake for the last few days.  Denies nausea, vomiting or diarrhea. Temp in clinic was 102 68 year old man with past medical history of a nonischemic cardiomyopathy and chronic systolic heart failure s/p HM2 LVAD in June 2017 complicated by recurrent GI hemorrhage and possible pump thrombosis who underwent heart transplant on 2/23/18. >presented to clinic today after routine blood work, stating he is not feeling well this morning, feels weak, no chills, no reported fever, denies chest pain or sob. + productive cough, clear sputum which has worsened the past few days. Also complains of decreased PO intake for the last few days.  Denies nausea, vomiting or diarrhea. Temp in clinic was 102 Of note right wrist is slightly tender, warm to touch w/ fluctuance...has had wrist swelling on previous admission with negative workup

## 2018-08-14 NOTE — H&P ADULT - NSHPPHYSICALEXAM_GEN_ALL_CORE
General: alert, well developed  ENT:  supple  Cardiovascular:   S1, S2, clean well healed sternotomy scar   Respiratory:  clear bilaterally, non productive cough  GI:  soft, non-tender, normal bowel sounds  Musculoskeletal:  Right wrist is warm,  fluctuant and mildly tender to palpation   Neurologic:  grossly non-focal  Skin:  no rash

## 2018-08-14 NOTE — CONSULT NOTE ADULT - PROBLEM SELECTOR RECOMMENDATION 2
f/u synovial analysis and uric acid   will hold on further treatment at this time in setting of acute infection

## 2018-08-14 NOTE — H&P ADULT - PROBLEM SELECTOR PLAN 3
Xrays wrist/hand  ortho consult  uric acid Xrays wrist/hand  ortho consult  arthrocentesis  abx to commence after aspiration  uric acid

## 2018-08-14 NOTE — H&P ADULT - ASSESSMENT
69 yo M s/p heart transplant complicated by rejection with rounds of plasmaphoresis , IVIG, rituximab x2, known colinizer with  Pseudomonas multiple readmissions last treated for PNA in June 2018 with cefepime and voriconazole now presents with fever, productive cough. Ddx include recurrent pneumonia vs gout flare  vs other infectious process.

## 2018-08-14 NOTE — PATIENT PROFILE ADULT. - REASON FOR ADMISSION
Admitted from MD's office due to fever of 102 F  and increase Heart rate, Patient denies chest pain, palpitation. However patient complaining of Left lower quadrant and lower extremity pain.

## 2018-08-15 DIAGNOSIS — J18.1 LOBAR PNEUMONIA, UNSPECIFIED ORGANISM: ICD-10-CM

## 2018-08-15 LAB
ALBUMIN SERPL ELPH-MCNC: 3.5 G/DL — SIGNIFICANT CHANGE UP (ref 3.3–5)
ALP SERPL-CCNC: 83 U/L — SIGNIFICANT CHANGE UP (ref 40–120)
ALT FLD-CCNC: 14 U/L — SIGNIFICANT CHANGE UP (ref 10–45)
ANION GAP SERPL CALC-SCNC: 15 MMOL/L — SIGNIFICANT CHANGE UP (ref 5–17)
AST SERPL-CCNC: 34 U/L — SIGNIFICANT CHANGE UP (ref 10–40)
BILIRUB SERPL-MCNC: 0.9 MG/DL — SIGNIFICANT CHANGE UP (ref 0.2–1.2)
BUN SERPL-MCNC: 48 MG/DL — HIGH (ref 7–23)
CALCIUM SERPL-MCNC: 8.4 MG/DL — SIGNIFICANT CHANGE UP (ref 8.4–10.5)
CHLORIDE SERPL-SCNC: 100 MMOL/L — SIGNIFICANT CHANGE UP (ref 96–108)
CMV DNA CSF QL NAA+PROBE: SIGNIFICANT CHANGE UP
CMV DNA SPEC NAA+PROBE-LOG#: SIGNIFICANT CHANGE UP LOGIU/ML
CO2 SERPL-SCNC: 18 MMOL/L — LOW (ref 22–31)
CREAT SERPL-MCNC: 1.63 MG/DL — HIGH (ref 0.5–1.3)
CULTURE RESULTS: NO GROWTH — SIGNIFICANT CHANGE UP
EBV EA AB SER IA-ACNC: 18 U/ML — HIGH
EBV EA AB TITR SER IF: POSITIVE
EBV EA IGG SER-ACNC: POSITIVE
EBV NA IGG SER IA-ACNC: 510 U/ML — HIGH
EBV PATRN SPEC IB-IMP: SIGNIFICANT CHANGE UP
EBV VCA IGG AVIDITY SER QL IA: POSITIVE
EBV VCA IGM SER IA-ACNC: 447 U/ML — HIGH
EBV VCA IGM SER IA-ACNC: <10 U/ML — SIGNIFICANT CHANGE UP
EBV VCA IGM TITR FLD: NEGATIVE — SIGNIFICANT CHANGE UP
GLUCOSE BLDC GLUCOMTR-MCNC: 118 MG/DL — HIGH (ref 70–99)
GLUCOSE BLDC GLUCOMTR-MCNC: 123 MG/DL — HIGH (ref 70–99)
GLUCOSE BLDC GLUCOMTR-MCNC: 126 MG/DL — HIGH (ref 70–99)
GLUCOSE BLDC GLUCOMTR-MCNC: 147 MG/DL — HIGH (ref 70–99)
GLUCOSE SERPL-MCNC: 126 MG/DL — HIGH (ref 70–99)
HCT VFR BLD CALC: 25.1 % — LOW (ref 39–50)
HCV RNA SERPL NAA DL=5-ACNC: SIGNIFICANT CHANGE UP IU/ML
HCV RNA SPEC NAA+PROBE-LOG IU: SIGNIFICANT CHANGE UP LOGIU/ML
HGB BLD-MCNC: 7.8 G/DL — LOW (ref 13–17)
LEGIONELLA AG UR QL: NEGATIVE — SIGNIFICANT CHANGE UP
MAGNESIUM SERPL-MCNC: 2.1 MG/DL
MCHC RBC-ENTMCNC: 28.3 PG — SIGNIFICANT CHANGE UP (ref 27–34)
MCHC RBC-ENTMCNC: 31.1 GM/DL — LOW (ref 32–36)
MCV RBC AUTO: 90.9 FL — SIGNIFICANT CHANGE UP (ref 80–100)
MRSA PCR RESULT.: SIGNIFICANT CHANGE UP
PLATELET # BLD AUTO: 308 K/UL — SIGNIFICANT CHANGE UP (ref 150–400)
POTASSIUM SERPL-MCNC: 4.6 MMOL/L — SIGNIFICANT CHANGE UP (ref 3.5–5.3)
POTASSIUM SERPL-SCNC: 4.6 MMOL/L — SIGNIFICANT CHANGE UP (ref 3.5–5.3)
PROT SERPL-MCNC: 6.4 G/DL — SIGNIFICANT CHANGE UP (ref 6–8.3)
RBC # BLD: 2.76 M/UL — LOW (ref 4.2–5.8)
RBC # FLD: 20.6 % — HIGH (ref 10.3–14.5)
S AUREUS DNA NOSE QL NAA+PROBE: SIGNIFICANT CHANGE UP
SODIUM SERPL-SCNC: 133 MMOL/L — LOW (ref 135–145)
SPECIMEN SOURCE: SIGNIFICANT CHANGE UP
TACROLIMUS SERPL-MCNC: 12.2 NG/ML — SIGNIFICANT CHANGE UP
URATE SERPL-MCNC: 7.3 MG/DL — SIGNIFICANT CHANGE UP (ref 3.4–8.8)
WBC # BLD: 6.04 K/UL — SIGNIFICANT CHANGE UP (ref 3.8–10.5)
WBC # FLD AUTO: 6.04 K/UL — SIGNIFICANT CHANGE UP (ref 3.8–10.5)

## 2018-08-15 PROCEDURE — 99223 1ST HOSP IP/OBS HIGH 75: CPT | Mod: GC

## 2018-08-15 PROCEDURE — 99233 SBSQ HOSP IP/OBS HIGH 50: CPT

## 2018-08-15 RX ORDER — TACROLIMUS 5 MG/1
0.5 CAPSULE ORAL
Qty: 0 | Refills: 0 | Status: DISCONTINUED | OUTPATIENT
Start: 2018-08-15 | End: 2018-08-19

## 2018-08-15 RX ADMIN — Medication 250 MILLIGRAM(S): at 17:56

## 2018-08-15 RX ADMIN — TACROLIMUS 0.5 MILLIGRAM(S): 5 CAPSULE ORAL at 08:23

## 2018-08-15 RX ADMIN — Medication 2 TABLET(S): at 17:44

## 2018-08-15 RX ADMIN — MAGNESIUM OXIDE 400 MG ORAL TABLET 400 MILLIGRAM(S): 241.3 TABLET ORAL at 08:23

## 2018-08-15 RX ADMIN — TACROLIMUS 0.5 MILLIGRAM(S): 5 CAPSULE ORAL at 19:58

## 2018-08-15 RX ADMIN — CEFEPIME 100 MILLIGRAM(S): 1 INJECTION, POWDER, FOR SOLUTION INTRAMUSCULAR; INTRAVENOUS at 14:34

## 2018-08-15 RX ADMIN — Medication 4 MILLIGRAM(S): at 08:23

## 2018-08-15 RX ADMIN — Medication 650 MILLIGRAM(S): at 17:44

## 2018-08-15 RX ADMIN — CEFEPIME 100 MILLIGRAM(S): 1 INJECTION, POWDER, FOR SOLUTION INTRAMUSCULAR; INTRAVENOUS at 14:28

## 2018-08-15 RX ADMIN — CEFEPIME 100 MILLIGRAM(S): 1 INJECTION, POWDER, FOR SOLUTION INTRAMUSCULAR; INTRAVENOUS at 21:11

## 2018-08-15 RX ADMIN — Medication 250 MILLIGRAM(S): at 06:07

## 2018-08-15 RX ADMIN — VORICONAZOLE 200 MILLIGRAM(S): 10 INJECTION, POWDER, LYOPHILIZED, FOR SOLUTION INTRAVENOUS at 17:45

## 2018-08-15 RX ADMIN — CEFEPIME 100 MILLIGRAM(S): 1 INJECTION, POWDER, FOR SOLUTION INTRAMUSCULAR; INTRAVENOUS at 02:57

## 2018-08-15 RX ADMIN — MAGNESIUM OXIDE 400 MG ORAL TABLET 400 MILLIGRAM(S): 241.3 TABLET ORAL at 17:44

## 2018-08-15 RX ADMIN — FAMOTIDINE 20 MILLIGRAM(S): 10 INJECTION INTRAVENOUS at 12:21

## 2018-08-15 RX ADMIN — ATORVASTATIN CALCIUM 20 MILLIGRAM(S): 80 TABLET, FILM COATED ORAL at 21:11

## 2018-08-15 RX ADMIN — VALGANCICLOVIR 450 MILLIGRAM(S): 450 TABLET, FILM COATED ORAL at 08:23

## 2018-08-15 RX ADMIN — Medication 650 MILLIGRAM(S): at 06:06

## 2018-08-15 RX ADMIN — Medication 2 TABLET(S): at 06:06

## 2018-08-15 RX ADMIN — VORICONAZOLE 200 MILLIGRAM(S): 10 INJECTION, POWDER, LYOPHILIZED, FOR SOLUTION INTRAVENOUS at 06:06

## 2018-08-15 RX ADMIN — VALGANCICLOVIR 450 MILLIGRAM(S): 450 TABLET, FILM COATED ORAL at 19:58

## 2018-08-15 RX ADMIN — ATOVAQUONE 1500 MILLIGRAM(S): 750 SUSPENSION ORAL at 12:21

## 2018-08-15 RX ADMIN — Medication 81 MILLIGRAM(S): at 12:20

## 2018-08-15 RX ADMIN — Medication 1 TABLET(S): at 12:21

## 2018-08-15 NOTE — PROGRESS NOTE ADULT - SUBJECTIVE AND OBJECTIVE BOX
Follow Up:      Interval History/ROS: 67 yo M s/p heart transplant coming in with fevers and evidence of new consolidations on CT chest concerning for pneumonia. Had fever over night. he reports feeling slightly better but complains of LUQ/chest wall discomfort     Denies Coughing or SOB  Denies N/V/C/D  Denies Urinary complaints     Allergies  No Known Allergies        ANTIMICROBIALS:  atovaquone Suspension 1500 daily  cefepime   IVPB    cefepime   IVPB 1000 every 8 hours  valGANciclovir 450 two times a day  vancomycin  IVPB 750 every 12 hours  voriconazole 200 every 12 hours      OTHER MEDS:  MEDICATIONS  (STANDING):  acetaminophen    Suspension 500 every 6 hours PRN  aspirin enteric coated 81 daily  atorvastatin 20 at bedtime  docusate sodium 100 two times a day PRN  famotidine    Tablet 20 daily  insulin lispro (HumaLOG) corrective regimen sliding scale  three times a day before meals  insulin lispro (HumaLOG) corrective regimen sliding scale  at bedtime  predniSONE   Tablet 4 <User Schedule>  tacrolimus 1 <User Schedule>  tacrolimus 0.5 <User Schedule>      Vital Signs Last 24 Hrs  T(C): 36.6 (15 Aug 2018 13:54), Max: 38.8 (15 Aug 2018 06:05)  T(F): 97.9 (15 Aug 2018 13:54), Max: 101.8 (15 Aug 2018 06:05)  HR: 106 (15 Aug 2018 13:54) (106 - 135)  BP: 96/68 (15 Aug 2018 13:54) (96/68 - 114/83)  BP(mean): --  RR: 18 (15 Aug 2018 13:54) (18 - 22)  SpO2: 95% (15 Aug 2018 13:54) (95% - 96%)    PHYSICAL EXAM:  General: non-toxic  HEAD/EYES: anicteric   ENT:  supple  Cardiovascular:   S1, S2, clean well healed sternotomy scar   Respiratory:  clear bilaterally  GI:  soft, non-tender, normal bowel sounds  :  no CVA tenderness   Musculoskeletal:  Right wrist is less warm, less fluctuant and nontender to palpation   Neurologic:  grossly non-focal  Skin:  no rash  Lymph: no lymphadenopathy  Psychiatric:  appropriate affect  Vascular:  no phlebitis                              7.8    6.04  )-----------( 308      ( 15 Aug 2018 12:09 )             25.1       08-15    133<L>  |  100  |  48<H>  ----------------------------<  126<H>  4.6   |  18<L>  |  1.63<H>    Ca    8.4      15 Aug 2018 08:19    TPro  6.4  /  Alb  3.5  /  TBili  0.9  /  DBili  x   /  AST  34  /  ALT  14  /  AlkPhos  83  08-15      MICROBIOLOGY:  v  .Sputum Sputum  08-14-18 --  --    Rare polymorphonuclear leukocytes per low power field  Rare Squamous epithelial cells per low power field  Moderate Gram Negative Rods per oil power field        Toxoplasma IgG Screen: 4.7 IU/mL (06-14-18 @ 09:42)  HIV-1 RNA Quantitative, Viral Load Log: NOT DET. lg /mL (05-31-18 @ 19:10)  CMV IgG Antibody: 5.40 U/mL (05-25-18 @ 17:51)  CMV IgG Antibody: >10.00 U/mL (02-22-18 @ 23:45)  HIV-1 RNA Quantitative, Viral Load Log: NOT DET. lg /mL (02-02-18 @ 19:25)    CMVPCR Log: NotDetec LogIU/mL (08-14 @ 20:39)  Rapid RVP Result: NotDetec (08-14 @ 17:12)        RADIOLOGY:  RADIOLOGY: < from: CT Chest No Cont (07.13.18 @ 08:44) >  IMPRESSION:   Left lower lobe thick linear opacity in place of prior left lower lobe   peripheral consolidation. Right lower lobe thick linear opacity slightly   improved compared to the prior study.     Right middle lobe consolidation and volume loss increased compared to the   prior study and may representatelectasis versus infection.    3 month follow-up CT recommended.    < end of copied text >    imaging below personally reviewed    < from: CT Chest No Cont (08.14.18 @ 16:03) >    New right upper lobe consolidation consistent withpneumonia. Consider   fungal etiologies.     A left lower lobe nodular opacity is not significantly changed from prior   imaging 7/13/2018.    No focal collections in the chest or abdomen.      < end of copied text >

## 2018-08-15 NOTE — PROGRESS NOTE ADULT - SUBJECTIVE AND OBJECTIVE BOX
Subjective: Pt states "I'm too tired" denies any CP, SOB, N/V and palpitations. No acute events overnight.     Telemetry:   - 140  Vital Signs Last 24 Hrs  T(C): 38.8 (08-15-18 @ 06:05), Max: 38.8 (08-15-18 @ 06:05)  T(F): 101.8 (08-15-18 @ 06:05), Max: 101.8 (08-15-18 @ 06:05)  HR: 135 (08-15-18 @ 06:05) (124 - 135)  BP: 112/77 (08-15-18 @ 06:05) (103/69 - 114/83)  RR: 20 (08-15-18 @ 06:05) (18 - 22)  SpO2: 95% (08-15-18 @ 06:05) (95% - 97%)         08-15 @ 07:01  -  08-15 @ 11:07  --------------------------------------------------------  IN: 0 mL / OUT: 200 mL / NET: -200 mL      Daily Weight in k.1 (15 Aug 2018 08:30)    CAPILLARY BLOOD GLUCOSE  112 - 118         PHYSICAL EXAM  Neurology: A&Ox3, nonfocal, no gross deficits  CV : RRR+S1S2  Lungs: Respirations non-labored, B/L BS  Abdomen: Soft, NT/ND, +BSx4Q, last BM   (-)N/V/D  : Voiding without difficulty  Extremities: B/L LE edema, negative calf tenderness, +PP       MEDICATIONS  acetaminophen    Suspension 500 milliGRAM(s) Oral every 6 hours PRN  aspirin enteric coated 81 milliGRAM(s) Oral daily  atorvastatin 20 milliGRAM(s) Oral at bedtime  atovaquone Suspension 1500 milliGRAM(s) Oral daily  calcium carbonate 1250 mG  + Vitamin D (OsCal 500 + D) 2 Tablet(s) Oral two times a day  cefepime   IVPB 1000 milliGRAM(s) IV Intermittent every 8 hours  docusate sodium 100 milliGRAM(s) Oral two times a day PRN  famotidine    Tablet 20 milliGRAM(s) Oral daily  insulin lispro (HumaLOG) corrective regimen sliding scale   SubCutaneous three times a day before meals  insulin lispro (HumaLOG) corrective regimen sliding scale   SubCutaneous at bedtime  magnesium oxide 400 milliGRAM(s) Oral two times a day with meals  multivitamin 1 Tablet(s) Oral daily  predniSONE   Tablet 4 milliGRAM(s) Oral <User Schedule>  sodium bicarbonate 650 milliGRAM(s) Oral two times a day  sodium polystyrene sulfonate Suspension 15 Gram(s) Oral daily PRN  tacrolimus 1 milliGRAM(s) Oral <User Schedule>  tacrolimus 0.5 milliGRAM(s) Oral <User Schedule>  valGANciclovir 450 milliGRAM(s) Oral two times a day  vancomycin  IVPB 750 milliGRAM(s) IV Intermittent every 12 hours  voriconazole 200 milliGRAM(s) Oral every 12 hours      Discussed with Cardiothoracic Team at AM rounds. Subjective: Pt states "I'm too tired" denies any CP, SOB, N/V and palpitations. No acute events overnight.     Telemetry:   - 140  Vital Signs Last 24 Hrs  T(C): 38.8 (08-15-18 @ 06:05), Max: 38.8 (08-15-18 @ 06:05)  T(F): 101.8 (08-15-18 @ 06:05), Max: 101.8 (08-15-18 @ 06:05)  HR: 135 (08-15-18 @ 06:05) (124 - 135)  BP: 112/77 (08-15-18 @ 06:05) (103/69 - 114/83)  RR: 20 (08-15-18 @ 06:05) (18 - 22)  SpO2: 95% (08-15-18 @ 06:05) (95% - 97%)         08-15 @ 07:01  -  08-15 @ 11:07  --------------------------------------------------------  IN: 0 mL / OUT: 200 mL / NET: -200 mL      Daily Weight in k.1 (15 Aug 2018 08:30)    CAPILLARY BLOOD GLUCOSE  112 - 118         PHYSICAL EXAM  Neurology: A&Ox3, nonfocal, no gross deficits  CV : RRR+S1S2  Lungs: Respirations non-labored, B/L BS clear, diminished at bases  Abdomen: Soft, NT/ND, +BSx4Q,  (-)N/V/D  : Voiding without difficulty  Extremities: B/L LE no edema, negative calf tenderness, +PP +R wrist warm, swollen, mild tenderness      MEDICATIONS  acetaminophen    Suspension 500 milliGRAM(s) Oral every 6 hours PRN  aspirin enteric coated 81 milliGRAM(s) Oral daily  atorvastatin 20 milliGRAM(s) Oral at bedtime  atovaquone Suspension 1500 milliGRAM(s) Oral daily  calcium carbonate 1250 mG  + Vitamin D (OsCal 500 + D) 2 Tablet(s) Oral two times a day  cefepime   IVPB 1000 milliGRAM(s) IV Intermittent every 8 hours  docusate sodium 100 milliGRAM(s) Oral two times a day PRN  famotidine    Tablet 20 milliGRAM(s) Oral daily  insulin lispro (HumaLOG) corrective regimen sliding scale   SubCutaneous three times a day before meals  insulin lispro (HumaLOG) corrective regimen sliding scale   SubCutaneous at bedtime  magnesium oxide 400 milliGRAM(s) Oral two times a day with meals  multivitamin 1 Tablet(s) Oral daily  predniSONE   Tablet 4 milliGRAM(s) Oral <User Schedule>  sodium bicarbonate 650 milliGRAM(s) Oral two times a day  sodium polystyrene sulfonate Suspension 15 Gram(s) Oral daily PRN  tacrolimus 1 milliGRAM(s) Oral <User Schedule>  tacrolimus 0.5 milliGRAM(s) Oral <User Schedule>  valGANciclovir 450 milliGRAM(s) Oral two times a day  vancomycin  IVPB 750 milliGRAM(s) IV Intermittent every 12 hours  voriconazole 200 milliGRAM(s) Oral every 12 hours      Discussed with Cardiothoracic Team at AM rounds.

## 2018-08-15 NOTE — PROGRESS NOTE ADULT - PROBLEM SELECTOR PLAN 1
CT Chest with Right middle lobe consolidation and volume loss increased compared to the prior study and may represent atelectasis versus infection.  -will cont vancomycin, voriconazole and cefepime per ID  -f/u BcXs, UA/UCx, fungitel, aspergilosus galactomannon, CMV/EBV PCR, quant gold, U legionella, U histo ag  -Check daily CBC CT Chest with new right upper lobe consolidation consistent with pneumonia.  -will cont vancomycin, voriconazole and cefepime per ID  -f/u BcXs, UA/UCx, fungitel, aspergilosus galactomannon, CMV/EBV PCR, quant gold, U legionella, U histo ag  -Check daily CBC

## 2018-08-15 NOTE — PROGRESS NOTE ADULT - ASSESSMENT
69 yo M s/p heart transplant complicated by rejection with rounds of plasmaphoresis , IVIG, rituximab x2, known colinizer with  Pseudomonas last treated for PNA in June 2018 with cefepime and voriconazole now presents with fever, decreased PO intake, productive cough. Ddx include recurrent pneumonia vs gout flare  vs other infectious process.     Suggest:  -CT chest/abd/pelvis without contrast showing new right lobe pneumonia with residual left sided infiltrate   -continue cefepime 1g q8hrs   -continue vancomycin 750mg q12hrs with daily troughs   -Ideally would like CT guided biopsy vs bronchoscopy for tissue diagnosis but patient is reluctant to under another procedure   -f/u on 2 sets of blood cultures  -sputum culture gram stain +GNR  -send urine culture  -urine legionella ag negative   -send fungitell  -send Aspergillosis Galactomannan  -send CMV and   -EBV shows past infection  -Uric acid, Serum 7.3   -Xray of right wrist   -negative arthrocentesis for gout   -f/u on synovial fluid culture   -continue Valganciclovir  -continue Voriconazole

## 2018-08-15 NOTE — PROGRESS NOTE ADULT - PROBLEM SELECTOR PLAN 3
TTE reviewed and EF at baseline. RV func perhaps mildly improved.   tacro still supratherapeutic   will adjust accordingly   cont prednisone 4 mg qd   f/u TTE   pt off cellcept due to hx of fungal infection - will cont hold   resumed valcyte   CAV: asa/rouvastatin.

## 2018-08-15 NOTE — PROGRESS NOTE ADULT - PROBLEM SELECTOR PLAN 1
Pt with h/o colonized carbapenem resistant pseudomonas   will cont vanc, voriconazole and cefepime per ID  RVP neg, U legionella neg, MRSA PCR neg   Sputum with GNR   pt with continued fevers   f/u BcXs, UCx, fungitel, aspergilosus galactomannon, CMV/EBV PCR, quant gold, U histo ag   plan for bronchoscopy if no improvement   arthrocentesis of R wrist with unable to obtain cell count, negative for crystals, culture pending

## 2018-08-15 NOTE — PROGRESS NOTE ADULT - ASSESSMENT
69 y/o male with PMH NICM HFrEF s/p HM2 LVAD 6/2017, who underwent heart transplant on 2/23/18 (CMV D-/R+ and Toxo D-/R+, Hep C+ heart) with post op graft dysfunction who underwent plasmapheresis and IVIG x2 as well as rituximab, with suspected fungal PNA diagnosed 6/2018 on voriconazole who presented with fevers from outpt clinic appointment - most likely secondary to recurrent PNA based on hx new cough x 3 days and RUL infiltrate on CT scan.   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 >presented to clinic today after routine blood work, stating he is not feeling well this morning, feels weak, no chills, no reported fever, denies chest pain or sob. + productive cough, clear sputum which has worsened the past few days. Also complains of decreased PO intake for the last few days.  Denies nausea, vomiting or diarrhea. Temp in clinic was 102 Of note right wrist is slightly tender, warm to touch w/ fluctuance...has had wrist swelling on previous admission with negative workup 67 y/o male with PMH NICM HFrEF s/p HM2 LVAD 6/2017, who underwent heart transplant on 2/23/18 (CMV D-/R+ and Toxo D-/R+, Hep C+ heart) with post op graft dysfunction who underwent plasmapheresis and IVIG x2 as well as rituximab, with suspected fungal PNA diagnosed 6/2018 on voriconazole, who presented to clinic today after routine blood work, stating he is not feeling well this morning, feels weak, no chills, no reported fever, denies chest pain or sob. Also complains of decreased PO intake for the last few days.  Denies nausea, vomiting or diarrhea. Of note right wrist is slightly tender, warm to touch w/ fluctuance, has had wrist swelling on previous admission with negative workup. Temp in clinic was 102 - most likely secondary to recurrent PNA based on new productive cough x 3 days and RUL infiltrate on CT scan.  8/14 Called by lab> arthrocentesis fluid sent for cell count> state not enough fluid for count  8/15 Still febrile, Tmax 101.8. Sputum Cx with mod gram neg rods. Unable to broaden cefepime- pt with hx of colonized carbapenem pseudomonas per ID. IR consulted to evaluate for transthoracic bx of R lung

## 2018-08-15 NOTE — PROGRESS NOTE ADULT - PROBLEM SELECTOR PLAN 2
Tacro level 12.2 today, check daily tacrolimus level   will cont tacro 1/0.5 today   cont prednisone 4 mg qd   pt off cellcept due to hx of fungal infection - will cont hold   Continue valcyte BID  Continue asa/rouvastatin for CAD

## 2018-08-15 NOTE — PROGRESS NOTE ADULT - SUBJECTIVE AND OBJECTIVE BOX
Pt seen at bedside. Cough unchanged. Denies CP, palpitations.       MEDICATIONS:  aspirin enteric coated 81 milliGRAM(s) Oral daily    atovaquone Suspension 1500 milliGRAM(s) Oral daily  cefepime   IVPB      cefepime   IVPB 1000 milliGRAM(s) IV Intermittent every 8 hours  valGANciclovir 450 milliGRAM(s) Oral two times a day  vancomycin  IVPB 750 milliGRAM(s) IV Intermittent every 12 hours  voriconazole 200 milliGRAM(s) Oral every 12 hours      acetaminophen    Suspension 500 milliGRAM(s) Oral every 6 hours PRN    docusate sodium 100 milliGRAM(s) Oral two times a day PRN  famotidine    Tablet 20 milliGRAM(s) Oral daily    atorvastatin 20 milliGRAM(s) Oral at bedtime  insulin lispro (HumaLOG) corrective regimen sliding scale   SubCutaneous three times a day before meals  insulin lispro (HumaLOG) corrective regimen sliding scale   SubCutaneous at bedtime  predniSONE   Tablet 4 milliGRAM(s) Oral <User Schedule>    calcium carbonate 1250 mG  + Vitamin D (OsCal 500 + D) 2 Tablet(s) Oral two times a day  magnesium oxide 400 milliGRAM(s) Oral two times a day with meals  multivitamin 1 Tablet(s) Oral daily  sodium bicarbonate 650 milliGRAM(s) Oral two times a day  tacrolimus 1 milliGRAM(s) Oral <User Schedule>  tacrolimus 0.5 milliGRAM(s) Oral <User Schedule>      PHYSICAL EXAM:  T(C): 36.6 (08-15-18 @ 13:54), Max: 38.8 (08-15-18 @ 06:05)  HR: 106 (08-15-18 @ 13:54) (106 - 135)  BP: 96/68 (08-15-18 @ 13:54) (96/68 - 114/83)  RR: 18 (08-15-18 @ 13:54) (18 - 22)  SpO2: 95% (08-15-18 @ 13:54) (95% - 96%)  Wt(kg): --  I&O's Summary    14 Aug 2018 07:01  -  15 Aug 2018 07:00  --------------------------------------------------------  IN: 1150 mL / OUT: 220 mL / NET: 930 mL    15 Aug 2018 07:01  -  15 Aug 2018 15:34  --------------------------------------------------------  IN: 320 mL / OUT: 200 mL / NET: 120 mL          Appearance: Normal, NAD   HEENT:   Normal oral mucosa, PERRL, EOMI	  Lymphatic: No lymphadenopathy  Cardiovascular: Normal S1 S2, No JVD, No murmurs, No edema  Respiratory: Decreased LLL   Psychiatry: A & O x 3, Mood & affect appropriate  Gastrointestinal:  Soft, Non-tender, + BS	  Skin: No rashes, No ecchymoses, No cyanosis	  Neurologic: Non-focal  R wrist improved - no further swelling   Index finger of right hand with dry necrotic digit tip   Extremities: Normal range of motion, No clubbing, cyanosis or edema  Vascular: Peripheral pulses palpable bilaterally      LABS:	 	    CBC Full  -  ( 15 Aug 2018 12:09 )  WBC Count : 6.04 K/uL  Hemoglobin : 7.8 g/dL  Hematocrit : 25.1 %  Platelet Count - Automated : 308 K/uL  Mean Cell Volume : 90.9 fl  Mean Cell Hemoglobin : 28.3 pg  Mean Cell Hemoglobin Concentration : 31.1 gm/dL  Auto Neutrophil # : x  Auto Lymphocyte # : x  Auto Monocyte # : x  Auto Eosinophil # : x  Auto Basophil # : x  Auto Neutrophil % : x  Auto Lymphocyte % : x  Auto Monocyte % : x  Auto Eosinophil % : x  Auto Basophil % : x    08-15    133<L>  |  100  |  48<H>  ----------------------------<  126<H>  4.6   |  18<L>  |  1.63<H>  08-14    135  |  100  |  51<H>  ----------------------------<  80  4.9   |  18<L>  |  1.82<H>    Ca    8.4      15 Aug 2018 08:19  Ca    9.0      14 Aug 2018 17:56    TPro  6.4  /  Alb  3.5  /  TBili  0.9  /  DBili  x   /  AST  34  /  ALT  14  /  AlkPhos  83  08-15  TPro  7.4  /  Alb  3.7  /  TBili  0.6  /  DBili  x   /  AST  47<H>  /  ALT  22  /  AlkPhos  93  08-14

## 2018-08-16 DIAGNOSIS — D64.89 OTHER SPECIFIED ANEMIAS: ICD-10-CM

## 2018-08-16 LAB
ALBUMIN SERPL ELPH-MCNC: 3.3 G/DL — SIGNIFICANT CHANGE UP (ref 3.3–5)
ALP SERPL-CCNC: 85 U/L — SIGNIFICANT CHANGE UP (ref 40–120)
ALT FLD-CCNC: 16 U/L — SIGNIFICANT CHANGE UP (ref 10–45)
ANION GAP SERPL CALC-SCNC: 15 MMOL/L — SIGNIFICANT CHANGE UP (ref 5–17)
AST SERPL-CCNC: 33 U/L — SIGNIFICANT CHANGE UP (ref 10–40)
BILIRUB SERPL-MCNC: 0.5 MG/DL — SIGNIFICANT CHANGE UP (ref 0.2–1.2)
BUN SERPL-MCNC: 58 MG/DL — HIGH (ref 7–23)
CALCIUM SERPL-MCNC: 8.5 MG/DL — SIGNIFICANT CHANGE UP (ref 8.4–10.5)
CHLORIDE SERPL-SCNC: 100 MMOL/L — SIGNIFICANT CHANGE UP (ref 96–108)
CO2 SERPL-SCNC: 18 MMOL/L — LOW (ref 22–31)
CREAT SERPL-MCNC: 1.68 MG/DL — HIGH (ref 0.5–1.3)
CULTURE RESULTS: SIGNIFICANT CHANGE UP
GALACTOMANNAN AG SERPL-ACNC: <0.5 INDEX — SIGNIFICANT CHANGE UP
GAMMA INTERFERON BACKGROUND BLD IA-ACNC: 0.02 IU/ML — SIGNIFICANT CHANGE UP
GLUCOSE BLDC GLUCOMTR-MCNC: 100 MG/DL — HIGH (ref 70–99)
GLUCOSE BLDC GLUCOMTR-MCNC: 117 MG/DL — HIGH (ref 70–99)
GLUCOSE BLDC GLUCOMTR-MCNC: 125 MG/DL — HIGH (ref 70–99)
GLUCOSE BLDC GLUCOMTR-MCNC: 157 MG/DL — HIGH (ref 70–99)
GLUCOSE SERPL-MCNC: 181 MG/DL — HIGH (ref 70–99)
HCT VFR BLD CALC: 27.3 % — LOW (ref 39–50)
HGB BLD-MCNC: 8.6 G/DL — LOW (ref 13–17)
M TB IFN-G BLD-IMP: ABNORMAL
M TB IFN-G CD4+ BCKGRND COR BLD-ACNC: -0.01 IU/ML — SIGNIFICANT CHANGE UP
M TB IFN-G CD4+CD8+ BCKGRND COR BLD-ACNC: -0.01 IU/ML — SIGNIFICANT CHANGE UP
MAGNESIUM SERPL-MCNC: 2.4 MG/DL — SIGNIFICANT CHANGE UP (ref 1.6–2.6)
MCHC RBC-ENTMCNC: 29.6 PG — SIGNIFICANT CHANGE UP (ref 27–34)
MCHC RBC-ENTMCNC: 31.5 GM/DL — LOW (ref 32–36)
MCV RBC AUTO: 93.9 FL — SIGNIFICANT CHANGE UP (ref 80–100)
PLATELET # BLD AUTO: 355 K/UL — SIGNIFICANT CHANGE UP (ref 150–400)
POTASSIUM SERPL-MCNC: 4.3 MMOL/L — SIGNIFICANT CHANGE UP (ref 3.5–5.3)
POTASSIUM SERPL-SCNC: 4.3 MMOL/L — SIGNIFICANT CHANGE UP (ref 3.5–5.3)
PROT SERPL-MCNC: 7 G/DL — SIGNIFICANT CHANGE UP (ref 6–8.3)
QUANT TB PLUS MITOGEN MINUS NIL: 0.23 IU/ML — SIGNIFICANT CHANGE UP
RBC # BLD: 2.91 M/UL — LOW (ref 4.2–5.8)
RBC # FLD: 20.1 % — HIGH (ref 10.3–14.5)
SODIUM SERPL-SCNC: 133 MMOL/L — LOW (ref 135–145)
SPECIMEN SOURCE: SIGNIFICANT CHANGE UP
TACROLIMUS SERPL-MCNC: 11.5 NG/ML — SIGNIFICANT CHANGE UP
VANCOMYCIN TROUGH SERPL-MCNC: 14.3 UG/ML — SIGNIFICANT CHANGE UP (ref 10–20)
WBC # BLD: 4.2 K/UL — SIGNIFICANT CHANGE UP (ref 3.8–10.5)
WBC # FLD AUTO: 4.2 K/UL — SIGNIFICANT CHANGE UP (ref 3.8–10.5)

## 2018-08-16 PROCEDURE — 99233 SBSQ HOSP IP/OBS HIGH 50: CPT | Mod: GC

## 2018-08-16 PROCEDURE — 99232 SBSQ HOSP IP/OBS MODERATE 35: CPT

## 2018-08-16 PROCEDURE — 73120 X-RAY EXAM OF HAND: CPT | Mod: 26,RT

## 2018-08-16 PROCEDURE — 71045 X-RAY EXAM CHEST 1 VIEW: CPT | Mod: 26

## 2018-08-16 PROCEDURE — 73090 X-RAY EXAM OF FOREARM: CPT | Mod: 26,LT

## 2018-08-16 RX ORDER — VALGANCICLOVIR 450 MG/1
450 TABLET, FILM COATED ORAL
Qty: 0 | Refills: 0 | Status: DISCONTINUED | OUTPATIENT
Start: 2018-08-17 | End: 2018-08-17

## 2018-08-16 RX ADMIN — Medication 250 MILLIGRAM(S): at 08:06

## 2018-08-16 RX ADMIN — Medication 4 MILLIGRAM(S): at 08:06

## 2018-08-16 RX ADMIN — Medication 650 MILLIGRAM(S): at 18:01

## 2018-08-16 RX ADMIN — VORICONAZOLE 200 MILLIGRAM(S): 10 INJECTION, POWDER, LYOPHILIZED, FOR SOLUTION INTRAVENOUS at 18:01

## 2018-08-16 RX ADMIN — CEFEPIME 100 MILLIGRAM(S): 1 INJECTION, POWDER, FOR SOLUTION INTRAMUSCULAR; INTRAVENOUS at 06:50

## 2018-08-16 RX ADMIN — TACROLIMUS 0.5 MILLIGRAM(S): 5 CAPSULE ORAL at 08:06

## 2018-08-16 RX ADMIN — Medication 81 MILLIGRAM(S): at 11:54

## 2018-08-16 RX ADMIN — Medication 1 TABLET(S): at 11:54

## 2018-08-16 RX ADMIN — MAGNESIUM OXIDE 400 MG ORAL TABLET 400 MILLIGRAM(S): 241.3 TABLET ORAL at 08:06

## 2018-08-16 RX ADMIN — Medication 2 TABLET(S): at 06:50

## 2018-08-16 RX ADMIN — Medication 650 MILLIGRAM(S): at 06:50

## 2018-08-16 RX ADMIN — VALGANCICLOVIR 450 MILLIGRAM(S): 450 TABLET, FILM COATED ORAL at 08:06

## 2018-08-16 RX ADMIN — FAMOTIDINE 20 MILLIGRAM(S): 10 INJECTION INTRAVENOUS at 11:54

## 2018-08-16 RX ADMIN — TACROLIMUS 0.5 MILLIGRAM(S): 5 CAPSULE ORAL at 20:01

## 2018-08-16 RX ADMIN — CEFEPIME 100 MILLIGRAM(S): 1 INJECTION, POWDER, FOR SOLUTION INTRAMUSCULAR; INTRAVENOUS at 14:13

## 2018-08-16 RX ADMIN — ATOVAQUONE 1500 MILLIGRAM(S): 750 SUSPENSION ORAL at 11:55

## 2018-08-16 RX ADMIN — MAGNESIUM OXIDE 400 MG ORAL TABLET 400 MILLIGRAM(S): 241.3 TABLET ORAL at 18:01

## 2018-08-16 RX ADMIN — CEFEPIME 100 MILLIGRAM(S): 1 INJECTION, POWDER, FOR SOLUTION INTRAMUSCULAR; INTRAVENOUS at 22:09

## 2018-08-16 RX ADMIN — VORICONAZOLE 200 MILLIGRAM(S): 10 INJECTION, POWDER, LYOPHILIZED, FOR SOLUTION INTRAVENOUS at 06:50

## 2018-08-16 RX ADMIN — Medication 250 MILLIGRAM(S): at 18:01

## 2018-08-16 RX ADMIN — Medication 20 MILLIGRAM(S): at 22:09

## 2018-08-16 RX ADMIN — Medication 2: at 11:55

## 2018-08-16 NOTE — DIETITIAN INITIAL EVALUATION ADULT. - PERTINENT LABORATORY DATA
Hgb/Hct:7.8/25.1-low, Na:133-low, K:4.6, Cl:100, BUN:48-high, Cr:1.63-high, Glucose:126-high, Ca:8.4, Total Protein:6.4, Albumin:3.5, Total Bilirubin:0.9, AlkPhos:83, AST:34, ALT:14, Bg levels: 8/15: 118-147, 8/16: 125

## 2018-08-16 NOTE — DIETITIAN INITIAL EVALUATION ADULT. - NS AS NUTRI INTERV ED CONTENT
Other (specify)/Reinforced Heart healthy, food safety for transplant recipients, and DM diet education. Pt verbalized understanding, accepted written education materials. RD remains available to monitor PO intake, wt, labs and diet education review

## 2018-08-16 NOTE — PROGRESS NOTE ADULT - PROBLEM SELECTOR PLAN 2
Tacro level 11.5 today, check daily tacrolimus level   will continue tacro 0.5 BID  continue prednisone 4 mg qd   pt off cellcept due to hx of fungal infection - will cont hold   Continue valcyte BID  Continue asa/rouvastatin for CAD

## 2018-08-16 NOTE — PROGRESS NOTE ADULT - SUBJECTIVE AND OBJECTIVE BOX
INFECTIOUS DISEASES FOLLOW UP-- Sujey Lujan  [phone]    This is a follow up note for this  68yMale with post heart transplant six months ago, admitted for treatment of pneumonia    Temperature curve improving while on antibiotics      ROS:  CONSTITUTIONAL:  No fever, fair appetite  CARDIOVASCULAR:  No chest pain or palpitations  RESPIRATORY:  No dyspnea  GASTROINTESTINAL:  No nausea, vomiting,  reports diarrhea, or abdominal pain  GENITOURINARY:  No dysuria  NEUROLOGIC:  No headache,     Allergies    No Known Allergies    Intolerances        ANTIBIOTICS/RELEVANT:  antimicrobials  atovaquone Suspension 1500 milliGRAM(s) Oral daily  cefepime   IVPB      cefepime   IVPB 1000 milliGRAM(s) IV Intermittent every 8 hours  vancomycin  IVPB 750 milliGRAM(s) IV Intermittent every 12 hours  voriconazole 200 milliGRAM(s) Oral every 12 hours    immunologic:  tacrolimus 0.5 milliGRAM(s) Oral <User Schedule>  tacrolimus 0.5 milliGRAM(s) Oral <User Schedule>    OTHER:  acetaminophen    Suspension 500 milliGRAM(s) Oral every 6 hours PRN  aspirin enteric coated 81 milliGRAM(s) Oral daily  Calcium Citrate +Vit D 315 mG/200 IU 2 Tablet(s) 2 Tablet(s) Oral <User Schedule>  docusate sodium 100 milliGRAM(s) Oral two times a day PRN  famotidine    Tablet 20 milliGRAM(s) Oral daily  insulin lispro (HumaLOG) corrective regimen sliding scale   SubCutaneous three times a day before meals  insulin lispro (HumaLOG) corrective regimen sliding scale   SubCutaneous at bedtime  magnesium oxide 400 milliGRAM(s) Oral two times a day with meals  multivitamin 1 Tablet(s) Oral daily  pravastatin 20 milliGRAM(s) Oral at bedtime  predniSONE   Tablet 4 milliGRAM(s) Oral <User Schedule>  sodium bicarbonate 650 milliGRAM(s) Oral two times a day      Objective:  Vital Signs Last 24 Hrs  T(C): 36.8 (16 Aug 2018 14:05), Max: 36.9 (16 Aug 2018 06:45)  T(F): 98.2 (16 Aug 2018 14:05), Max: 98.4 (16 Aug 2018 06:45)  HR: 110 (16 Aug 2018 14:05) (98 - 121)  BP: 103/69 (16 Aug 2018 14:05) (102/69 - 106/70)  BP(mean): --  RR: 18 (16 Aug 2018 14:05) (18 - 18)  SpO2: 95% (16 Aug 2018 14:05) (95% - 97%)    PHYSICAL EXAM:  Constitutional:no acute distress  Eyes:WALT, EOMI  Ear/Nose/Throat: no oral lesions, 	  Respiratory: clear BL  Cardiovascular: S1S2  Gastrointestinal:soft, (+) BS, no tenderness  Extremities:no e/e/c the right wrist swelling is much improved  No Lymphadenopathy  IV sites not inflammed.    LABS:                        8.6    4.2   )-----------( 355      ( 16 Aug 2018 10:01 )             27.3     08-16    133<L>  |  100  |  58<H>  ----------------------------<  181<H>  4.3   |  18<L>  |  1.68<H>    Ca    8.5      16 Aug 2018 10:01  Mg     2.4     08-16    TPro  7.0  /  Alb  3.3  /  TBili  0.5  /  DBili  x   /  AST  33  /  ALT  16  /  AlkPhos  85  08-16          MICROBIOLOGY:            RECENT CULTURES:  08-15 @ 02:27  .Urine Clean Catch (Midstream)  --  --  --    No growth  --  08-14 @ 21:27  .Blood Blood-Peripheral  --  --  --    No growth to date.  --  08-14 @ 21:11  .Abscess Arm - Right  --  --  --    No growth  --  08-14 @ 16:59  .Sputum Sputum  --  --  --    Normal Respiratory Heaven present  --      RADIOLOGY & ADDITIONAL STUDIES:    < from: Xray Forearm, Right (08.16.18 @ 09:52) >    IMPRESSION:   There is no acute fracture or dislocation. There is a curvilinear area of   sclerosis along the lateral aspect of the distal radius, which may   represent osteonecrosis of a prior fracture fragment of the distal radius   and/or scaphoid. There is distortion of theproximal carpal row   alignment. There is negative ulnar variance. There is soft tissue   swelling about the wrist, which is nonspecific and may be inflammatory   versus infectious versus degenerative in etiology.   Elbow articulation is preserved. Joint spaces of the elbow are preserved.   There is no elbow joint effusion.    < end of copied text >  < from: Xray Chest 1 View- PORTABLE-Routine (08.16.18 @ 06:47) >  Worsening opacity in the right lower lung field more apparent compared to   prior radiograph. No pneumothorax.    IMPRESSION: Right-sided pneumonia.    < end of copied text >

## 2018-08-16 NOTE — PROGRESS NOTE ADULT - ASSESSMENT
69 yo M s/p heart transplant complicated by rejection with rounds of plasmaphoresis , IVIG, rituximab x2, known colinizer with  Pseudomonas last treated for PNA in June 2018 with cefepime and voriconazole now presents with fever, decreased PO intake, productive cough. Ddx include recurrent pneumonia vs gout flare  vs other infectious process.     Suggest:  -CT chest/abd/pelvis without contrast showing new right lobe pneumonia with residual left sided infiltrate   -continue cefepime 1g q8hrs   -continue vancomycin 750mg q12hrs with daily troughs   -Ideally would like CT guided biopsy vs bronchoscopy for tissue diagnosis but patient is reluctant to under another procedure   -f/u on 2 sets of blood cultures  -sputum culture gram stain +GNR  -send urine culture  -urine legionella ag negative   -send fungitell  -send Aspergillosis Galactomannan  -send CMV - PCR negative  -EBV shows past infection  -Uric acid, Serum 7.3     Complaining of diarrhea- loose BMS x3 times today  would send a stool C.diff    -Xray of right wrist   -negative arthrocentesis for gout   -f/u on synovial fluid culture is NGTD    -continue Valganciclovir  -continue Voriconazole    Plan for CT guided Right lung biopsy tomorrow- will need specimens sent for gram stain and culture, fungal stain and culture and mycobacterial stain and culture    Gary Lujan MD  [phone]  After 5pm/weekends [phone]

## 2018-08-16 NOTE — PROGRESS NOTE ADULT - PROBLEM SELECTOR PLAN 1
CT Chest with new right upper lobe consolidation consistent with pneumonia.  -will cont vancomycin, voriconazole and cefepime per ID  -f/u finalized sputum Cx results. 8/14 BCx and urine Cx negative to date  -Check daily CBC  -NPO p MN for CT guided lung biopsy at 3pm Friday 8/17 CT Chest with new right upper lobe consolidation consistent with pneumonia.  -will cont vancomycin, voriconazole and cefepime per ID  -f/u finalized sputum Cx results. 8/14 BCx and urine Cx negative to date  -Check daily CBC  -NPO p MN for CT guided lung biopsy at 3pm Friday 8/17  -AM aspirin needs to be held per radiology for procedure

## 2018-08-16 NOTE — DIETITIAN INITIAL EVALUATION ADULT. - PERTINENT MEDS FT
Prograf, Lipitor, Oscal, colace, Pepcid, Insulin sliding scale, magox, MVI. Prednisone, NaBicarb, Kayexalate

## 2018-08-16 NOTE — DIETITIAN INITIAL EVALUATION ADULT. - OTHER INFO
Nutrition consult received for Nutrition assessment. Pt seen sitting in bed, reports feeling well. Pt endorses a fair appetite since admission, consumes % of meals. Pt reports he has been eating well at home until his day of admission when he didn't feel well enough to eat. Pt reports a UBW of 160lbs, and during previous RD encounter Pt had reported Wt to be 175lbs. Although Wt's obtained on previous admission were 154lbs and Pt now admitted at 152.3lbs. Pt visually appears well nourished with a good PO intake, ? accuracy of Pt's reported weights. Pt has bene monitoring BG level 1x daily with usually results between 90-110mg/dl. Pt was amenable to education reinforcement regarding DM, Food safety for trasnplant recipients and Heart healthy eating. Pt used teach back points, verbalized understanding and requested additional written education materials: RD will provide. Pt denies GI distress, chewing/swallowing difficulty, micronutrient supplements. NKFA

## 2018-08-16 NOTE — DIETITIAN INITIAL EVALUATION ADULT. - PHYSICAL APPEARANCE
well nourished/Nutrition focused physical exam: Mild temporal wasting. Pt otherwise olivera snot appear to have fat and muscle wasting.

## 2018-08-16 NOTE — DIETITIAN INITIAL EVALUATION ADULT. - ENERGY NEEDS
Ht: 5'8", Wt: 152.3lbs, BMI: 23.5kg/m2, IBW: 154lbs(+/-10%), 98%IBW  Pertinent information: Pt admitted fir Fevers, Pt with PMHx of Heart Transplant. Per chart Pt with SIRS, PNA and pseudomonas.   +1 tara leg edema, Skin intact

## 2018-08-16 NOTE — PROGRESS NOTE ADULT - ASSESSMENT
67 y/o male with PMH NICM HFrEF s/p HM2 LVAD 6/2017, who underwent heart transplant on 2/23/18 (CMV D-/R+ and Toxo D-/R+, Hep C+ heart) with post op graft dysfunction who underwent plasmapheresis and IVIG x2 as well as rituximab, with suspected fungal PNA diagnosed 6/2018 on voriconazole, who presented to clinic today after routine blood work, stating he is not feeling well this morning, feels weak, no chills, no reported fever, denies chest pain or sob. Also complains of decreased PO intake for the last few days.  Denies nausea, vomiting or diarrhea. Of note right wrist is slightly tender, warm to touch w/ fluctuance, has had wrist swelling on previous admission with negative workup. Temp in clinic was 102 - most likely secondary to recurrent PNA based on new productive cough x 3 days and RUL infiltrate on CT scan.  8/14 Called by lab> arthrocentesis fluid sent for cell count> state not enough fluid for count  8/15 Still febrile, Tmax 101.8. Sputum Cx with mod gram neg rods. Unable to broaden cefepime- pt with hx of colonized carbapenem pseudomonas per ID. IR consulted to evaluate for transthoracic bx of R lung  8/16 CT guided biopsy of Right lung scheduled for Friday with Dr. Alfrde, continued on IV antibiotics. VSS, Pt afebrile Tmax 98.4. Tacro level 11.5

## 2018-08-16 NOTE — DIETITIAN INITIAL EVALUATION ADULT. - ORAL INTAKE PTA
good/Pt reports a good PO intake PTA. States he has been receiving Meals on Wheels 5 meals a week and has his HHA (7 days weekly prepare him breakfast and other meals)

## 2018-08-16 NOTE — PROGRESS NOTE ADULT - PROBLEM SELECTOR PLAN 4
Stable. Likely secondary to low renal perfusion in a septic state   cont monitor   encouraged PO intake

## 2018-08-16 NOTE — PROGRESS NOTE ADULT - ASSESSMENT
68 year old man PMH NICM HFrEF s/p HM2 LVAD 6/2017, who underwent heart transplant on 2/23/18 (CMV D-/R+ and Toxo D-/R+, Hep C+ heart) with post op graft dysfunction who underwent plasmapheresis and IVIG x2 as well as rituximab, with suspected fungal PNA diagnosed 6/2018 on voriconazole who presented with fevers from outpt clinic appointment - most likely secondary to recurrent PNA based on hx new cough x 3 days and RUL infiltrate on CT scan - improving on broad spectrum antibiotics.

## 2018-08-16 NOTE — PROGRESS NOTE ADULT - SUBJECTIVE AND OBJECTIVE BOX
Cough still present. Denies CP, SOB, LE swelling.     MEDICATIONS:  aspirin enteric coated 81 milliGRAM(s) Oral daily    atovaquone Suspension 1500 milliGRAM(s) Oral daily  cefepime   IVPB      cefepime   IVPB 1000 milliGRAM(s) IV Intermittent every 8 hours  vancomycin  IVPB 750 milliGRAM(s) IV Intermittent every 12 hours  voriconazole 200 milliGRAM(s) Oral every 12 hours      acetaminophen    Suspension 500 milliGRAM(s) Oral every 6 hours PRN    docusate sodium 100 milliGRAM(s) Oral two times a day PRN  famotidine    Tablet 20 milliGRAM(s) Oral daily    insulin lispro (HumaLOG) corrective regimen sliding scale   SubCutaneous three times a day before meals  insulin lispro (HumaLOG) corrective regimen sliding scale   SubCutaneous at bedtime  pravastatin 20 milliGRAM(s) Oral at bedtime  predniSONE   Tablet 4 milliGRAM(s) Oral <User Schedule>    magnesium oxide 400 milliGRAM(s) Oral two times a day with meals  multivitamin 1 Tablet(s) Oral daily  sodium bicarbonate 650 milliGRAM(s) Oral two times a day  tacrolimus 0.5 milliGRAM(s) Oral <User Schedule>  tacrolimus 0.5 milliGRAM(s) Oral <User Schedule>      PHYSICAL EXAM:  T(C): 36.9 (08-16-18 @ 06:45), Max: 36.9 (08-16-18 @ 06:45)  HR: 98 (08-16-18 @ 06:45) (98 - 121)  BP: 106/70 (08-16-18 @ 06:45) (96/68 - 106/70)  RR: 18 (08-16-18 @ 06:45) (18 - 18)  SpO2: 97% (08-16-18 @ 06:45) (95% - 97%)  Wt(kg): --  I&O's Summary    15 Aug 2018 07:01  -  16 Aug 2018 07:00  --------------------------------------------------------  IN: 920 mL / OUT: 500 mL / NET: 420 mL    16 Aug 2018 07:01  -  16 Aug 2018 11:51  --------------------------------------------------------  IN: 120 mL / OUT: 0 mL / NET: 120 mL        Appearance: Normal, NAD   HEENT:   Normal oral mucosa, PERRL, EOMI	  Lymphatic: No lymphadenopathy  Cardiovascular: Normal S1 S2, No JVD, No murmurs, No edema  Respiratory: Decreased LLL   Psychiatry: A & O x 3, Mood & affect appropriate  Gastrointestinal:  Soft, Non-tender, + BS	  Skin: No rashes, No ecchymoses, No cyanosis	  Neurologic: Non-focal  Index finger of right hand with dry necrotic digit tip   Extremities: Normal range of motion, No clubbing, cyanosis or edema  Vascular: Peripheral pulses palpable bilaterally      LABS:	 	    CBC Full  -  ( 16 Aug 2018 10:01 )  WBC Count : 4.2 K/uL  Hemoglobin : 8.6 g/dL  Hematocrit : 27.3 %  Platelet Count - Automated : 355 K/uL  Mean Cell Volume : 93.9 fl  Mean Cell Hemoglobin : 29.6 pg  Mean Cell Hemoglobin Concentration : 31.5 gm/dL  Auto Neutrophil # : x  Auto Lymphocyte # : x  Auto Monocyte # : x  Auto Eosinophil # : x  Auto Basophil # : x  Auto Neutrophil % : x  Auto Lymphocyte % : x  Auto Monocyte % : x  Auto Eosinophil % : x  Auto Basophil % : x    08-16    133<L>  |  100  |  58<H>  ----------------------------<  181<H>  4.3   |  18<L>  |  1.68<H>  08-15    133<L>  |  100  |  48<H>  ----------------------------<  126<H>  4.6   |  18<L>  |  1.63<H>    Ca    8.5      16 Aug 2018 10:01  Ca    8.4      15 Aug 2018 08:19  Mg     2.4     08-16    TPro  7.0  /  Alb  3.3  /  TBili  0.5  /  DBili  x   /  AST  33  /  ALT  16  /  AlkPhos  85  08-16  TPro  6.4  /  Alb  3.5  /  TBili  0.9  /  DBili  x   /  AST  34  /  ALT  14  /  AlkPhos  83  08-15 Cough still present. Denies CP, SOB, LE swelling.     MEDICATIONS:  aspirin enteric coated 81 milliGRAM(s) Oral daily    atovaquone Suspension 1500 milliGRAM(s) Oral daily  cefepime   IVPB      cefepime   IVPB 1000 milliGRAM(s) IV Intermittent every 8 hours  vancomycin  IVPB 750 milliGRAM(s) IV Intermittent every 12 hours  voriconazole 200 milliGRAM(s) Oral every 12 hours      acetaminophen    Suspension 500 milliGRAM(s) Oral every 6 hours PRN    docusate sodium 100 milliGRAM(s) Oral two times a day PRN  famotidine    Tablet 20 milliGRAM(s) Oral daily    insulin lispro (HumaLOG) corrective regimen sliding scale   SubCutaneous three times a day before meals  insulin lispro (HumaLOG) corrective regimen sliding scale   SubCutaneous at bedtime  pravastatin 20 milliGRAM(s) Oral at bedtime  predniSONE   Tablet 4 milliGRAM(s) Oral <User Schedule>    magnesium oxide 400 milliGRAM(s) Oral two times a day with meals  multivitamin 1 Tablet(s) Oral daily  sodium bicarbonate 650 milliGRAM(s) Oral two times a day  tacrolimus 0.5 milliGRAM(s) Oral <User Schedule>  tacrolimus 0.5 milliGRAM(s) Oral <User Schedule>      PHYSICAL EXAM:  T(C): 36.9 (08-16-18 @ 06:45), Max: 36.9 (08-16-18 @ 06:45)  HR: 98 (08-16-18 @ 06:45) (98 - 121)  BP: 106/70 (08-16-18 @ 06:45) (96/68 - 106/70)  RR: 18 (08-16-18 @ 06:45) (18 - 18)  SpO2: 97% (08-16-18 @ 06:45) (95% - 97%)  Wt(kg): --  I&O's Summary    15 Aug 2018 07:01  -  16 Aug 2018 07:00  --------------------------------------------------------  IN: 920 mL / OUT: 500 mL / NET: 420 mL    16 Aug 2018 07:01  -  16 Aug 2018 11:51  --------------------------------------------------------  IN: 120 mL / OUT: 0 mL / NET: 120 mL        Appearance: Normal, NAD   HEENT:   Normal oral mucosa, PERRL, EOMI	  Lymphatic: No lymphadenopathy  Cardiovascular: Normal S1 S2, No JVD, No murmurs, No edema  Respiratory: Decreased LLL   Psychiatry: A & O x 3, Mood & affect appropriate  Gastrointestinal:  Soft, Non-tender, + BS	  Skin: No rashes, No ecchymoses, No cyanosis	  Neurologic: Non-focal  Index finger of right hand with dry necrotic digit tip   Extremities: Normal range of motion, No clubbing, cyanosis or edema  Vascular: Peripheral pulses palpable bilaterally      LABS:	 	    CBC Full  -  ( 16 Aug 2018 10:01 )  WBC Count : 4.2 K/uL  Hemoglobin : 8.6 g/dL  Hematocrit : 27.3 %  Platelet Count - Automated : 355 K/uL  Mean Cell Volume : 93.9 fl  Mean Cell Hemoglobin : 29.6 pg  Mean Cell Hemoglobin Concentration : 31.5 gm/dL  Auto Neutrophil # : x  Auto Lymphocyte # : x  Auto Monocyte # : x  Auto Eosinophil # : x  Auto Basophil # : x  Auto Neutrophil % : x  Auto Lymphocyte % : x  Auto Monocyte % : x  Auto Eosinophil % : x  Auto Basophil % : x    08-16    133<L>  |  100  |  58<H>  ----------------------------<  181<H>  4.3   |  18<L>  |  1.68<H>  08-15    133<L>  |  100  |  48<H>  ----------------------------<  126<H>  4.6   |  18<L>  |  1.63<H>    Ca    8.5      16 Aug 2018 10:01  Ca    8.4      15 Aug 2018 08:19  Mg     2.4     08-16    TPro  7.0  /  Alb  3.3  /  TBili  0.5  /  DBili  x   /  AST  33  /  ALT  16  /  AlkPhos  85  08-16  TPro  6.4  /  Alb  3.5  /  TBili  0.9  /  DBili  x   /  AST  34  /  ALT  14  /  AlkPhos  83  08-15    < from: Transthoracic Echocardiogram (08.14.18 @ 17:33) >    Patient name: BILLY RUSSELL  YOB: 1950   Age: 68 (M)   MR#: [id number]  Study Date: 8/14/2018  Location: [address] [place], [place] [id number]: [facility]  Study quality: Technically good  Referring Physician: Ti Parker MD  Blood Pressure: 103/69 mmHg  Height: 173 cm  Weight: 78 kg  BSA: 1.9 m2  ------------------------------------------------------------------------  PROCEDURE: Transthoracic echocardiogram with 2-D, M-Mode  and complete spectral and color flow Doppler.  INDICATION:Palpitations (R00.2)  ------------------------------------------------------------------------  Dimensions:    Normal Values:  LA:     4.1    2.0 - 4.0 cm  Ao:     4.1    2.0 - 3.8 cm  SEPTUM: 1.2    0.6 - 1.2 cm  PWT:    1.2    0.6 - 1.1 cm  LVIDd:4.5    3.0 - 5.6 cm  LVIDs:  3.8    1.8 - 4.0 cm  Derived variables:  LVMI: 103 g/m2  RWT: 0.53  Fractional short: 16 %  EF (Visual Estimate): 45-50 %  Doppler Peak Velocity (m/sec): AoV=1.1  ------------------------------------------------------------------------  Observations:  Mitral Valve: Normal mitral valve. Mild mitral  regurgitation.  Aortic Valve/Aorta: Normal trileaflet aortic valve. Peak  transaortic valve gradient equals 5 mm Hg. Peak left  ventricular outflow tract gradient equals 3 mm Hg.  Aortic Root: 4.1 cm.  Left Atrium: Moderate left atrial enlargement.  Left Ventricle: Left ventricular ejection fraction, by  visual estimation, is 45-50%.  Paradoxical septal motion.  Normal left ventricular internal dimensions and wall  thicknesses.  Right Heart: Normal right atrium. Right ventricle mildly  dilated with mildly reduced systolic function. Normal  tricuspid valve. Mild tricuspid regurgitation. Normal  pulmonic valve. Mild pulmonic regurgitation.  Pericardium/Pleura: Normal pericardium with no pericardial  effusion.  Hemodynamic: Estimated right ventricular systolic pressure  equals 29 mm Hg, assuming right atrial pressure equals 8 mm  Hg, consistent with normal pulmonary pressures.  ------------------------------------------------------------------------  Conclusions:  Patient tachycardic throughout the study.  1. Moderate left atrial enlargement.  2. Left ventricular ejection fraction, by visual  estimation, is 45-50%.  Paradoxical septal motion.  3. Normal right atrium.  4. Right ventricle mildly dilated with mildly reduced  systolic function.  5. No significant valvular abnormalities.  6. Normal pericardium with no pericardial effusion.  Compared with echocardiogram of 8/1/2018, no significant  changes noted.Findings discussed with heart failure fellow  at 11:00pm.  ------------------------------------------------------------------------  Confirmed on  8/15/2018 - 00:56:50 by Sharon Link M.D.  ------------------------------------------------------------------------    < end of copied text >

## 2018-08-16 NOTE — PROGRESS NOTE ADULT - PROBLEM SELECTOR PLAN 2
TTE reviewed and EF at baseline. RV func mildly improved.   tacro still supratherapeutic   will adjust accordingly   cont prednisone 4 mg qd   f/u TTE   pt off cellcept due to hx of fungal infection - will cont hold   resumed valcyte qd (GFR 41)  CAV: asa/pravastatin TTE reviewed and EF at baseline. RV func mildly improved.   tacro still supratherapeutic   will adjust accordingly   cont prednisone 4 mg qd   pt off cellcept due to hx of fungal infection - will cont hold   resumed valcyte qd (GFR 41)  CAV: asa/pravastatin

## 2018-08-16 NOTE — PROGRESS NOTE ADULT - SUBJECTIVE AND OBJECTIVE BOX
Subjective: Pt states "I'm alright" denies any CP, SOB, N/V and palpitations. No acute events overnight.     Telemetry:   - 130  Vital Signs Last 24 Hrs  T(C): 36.8 (18 @ 14:05), Max: 36.9 (18 @ 06:45)  T(F): 98.2 (18 @ 14:05), Max: 98.4 (18 @ 06:45)  HR: 110 (18 @ 14:05) (98 - 121)  BP: 103/69 (18 @ 14:05) (102/69 - 106/70)  RR: 18 (18 @ 14:05) (18 - 18)  SpO2: 95% (18 @ 14:05) (95% - 97%)             16 @ 07:01  -   @ 14:43  --------------------------------------------------------  IN: 360 mL / OUT: 0 mL / NET: 360 mL      Daily Weight in k (16 Aug 2018 14:26)    CAPILLARY BLOOD GLUCOSE  125 - 157          PHYSICAL EXAM  Neurology: A&Ox3, nonfocal, no gross deficits  CV : RRR+S1S2  Lungs: Respirations non-labored, B/L BS clear, diminished at bases  Abdomen: Soft, NT/ND, +BSx4Q,  (-)N/V/D  : Voiding without difficulty  Extremities: B/L LE no edema, negative calf tenderness, +PP      MEDICATIONS  acetaminophen    Suspension 500 milliGRAM(s) Oral every 6 hours PRN  aspirin enteric coated 81 milliGRAM(s) Oral daily  atovaquone Suspension 1500 milliGRAM(s) Oral daily  Calcium Citrate +Vit D 315 mG/200 IU 2 Tablet(s) 2 Tablet(s) Oral <User Schedule>  cefepime   IVPB 1000 milliGRAM(s) IV Intermittent every 8 hours  docusate sodium 100 milliGRAM(s) Oral two times a day PRN  famotidine    Tablet 20 milliGRAM(s) Oral daily  insulin lispro (HumaLOG) corrective regimen sliding scale   SubCutaneous three times a day before meals  insulin lispro (HumaLOG) corrective regimen sliding scale   SubCutaneous at bedtime  magnesium oxide 400 milliGRAM(s) Oral two times a day with meals  multivitamin 1 Tablet(s) Oral daily  pravastatin 20 milliGRAM(s) Oral at bedtime  predniSONE   Tablet 4 milliGRAM(s) Oral <User Schedule>  sodium bicarbonate 650 milliGRAM(s) Oral two times a day  tacrolimus 0.5 milliGRAM(s) Oral <User Schedule>  tacrolimus 0.5 milliGRAM(s) Oral <User Schedule>  vancomycin  IVPB 750 milliGRAM(s) IV Intermittent every 12 hours  voriconazole 200 milliGRAM(s) Oral every 12 hours      Discussed with Cardiothoracic Team at AM rounds.

## 2018-08-16 NOTE — PROGRESS NOTE ADULT - PROBLEM SELECTOR PLAN 1
Pt with h/o colonized carbapenem resistant pseudomonas   will cont vanc, voriconazole and cefepime per ID  RVP neg, U legionella neg, MRSA PCR neg   Sputum with GNR - awaiting speciation   Pt defervesced - last temp 8/5 am   BCx - NGTD, UA neg, CMV PCR neg, U legionella neg   f/u UCx, fungitel, aspergilosus galactomannon, quant gold, U histo ag   plan for lung tissue biopsy tomorrow with IR   arthrocentesis of R wrist - culture No growth

## 2018-08-17 ENCOUNTER — RESULT REVIEW (OUTPATIENT)
Age: 68
End: 2018-08-17

## 2018-08-17 DIAGNOSIS — N25.89 OTHER DISORDERS RESULTING FROM IMPAIRED RENAL TUBULAR FUNCTION: ICD-10-CM

## 2018-08-17 DIAGNOSIS — M10.9 GOUT, UNSPECIFIED: ICD-10-CM

## 2018-08-17 DIAGNOSIS — D72.818 OTHER DECREASED WHITE BLOOD CELL COUNT: ICD-10-CM

## 2018-08-17 LAB
ANION GAP SERPL CALC-SCNC: 10 MMOL/L — SIGNIFICANT CHANGE UP (ref 5–17)
APTT BLD: 28.3 SEC — SIGNIFICANT CHANGE UP (ref 27.5–37.4)
BLD GP AB SCN SERPL QL: NEGATIVE — SIGNIFICANT CHANGE UP
BUN SERPL-MCNC: 60 MG/DL — HIGH (ref 7–23)
CALCIUM SERPL-MCNC: 8.7 MG/DL — SIGNIFICANT CHANGE UP (ref 8.4–10.5)
CHLORIDE SERPL-SCNC: 105 MMOL/L — SIGNIFICANT CHANGE UP (ref 96–108)
CO2 SERPL-SCNC: 18 MMOL/L — LOW (ref 22–31)
CREAT SERPL-MCNC: 1.6 MG/DL — HIGH (ref 0.5–1.3)
GLUCOSE BLDC GLUCOMTR-MCNC: 107 MG/DL — HIGH (ref 70–99)
GLUCOSE BLDC GLUCOMTR-MCNC: 118 MG/DL — HIGH (ref 70–99)
GLUCOSE BLDC GLUCOMTR-MCNC: 120 MG/DL — HIGH (ref 70–99)
GLUCOSE BLDC GLUCOMTR-MCNC: 136 MG/DL — HIGH (ref 70–99)
GLUCOSE SERPL-MCNC: 107 MG/DL — HIGH (ref 70–99)
HCT VFR BLD CALC: 25.1 % — LOW (ref 39–50)
HGB BLD-MCNC: 8 G/DL — LOW (ref 13–17)
MCHC RBC-ENTMCNC: 29.6 PG — SIGNIFICANT CHANGE UP (ref 27–34)
MCHC RBC-ENTMCNC: 32 GM/DL — SIGNIFICANT CHANGE UP (ref 32–36)
MCV RBC AUTO: 92.4 FL — SIGNIFICANT CHANGE UP (ref 80–100)
PLATELET # BLD AUTO: 362 K/UL — SIGNIFICANT CHANGE UP (ref 150–400)
POTASSIUM SERPL-MCNC: 4.6 MMOL/L — SIGNIFICANT CHANGE UP (ref 3.5–5.3)
POTASSIUM SERPL-SCNC: 4.6 MMOL/L — SIGNIFICANT CHANGE UP (ref 3.5–5.3)
RBC # BLD: 2.71 M/UL — LOW (ref 4.2–5.8)
RBC # FLD: 19.9 % — HIGH (ref 10.3–14.5)
RH IG SCN BLD-IMP: POSITIVE — SIGNIFICANT CHANGE UP
SODIUM SERPL-SCNC: 133 MMOL/L — LOW (ref 135–145)
TACROLIMUS SERPL-MCNC: 10.6 NG/ML — SIGNIFICANT CHANGE UP
WBC # BLD: 3.3 K/UL — LOW (ref 3.8–10.5)
WBC # FLD AUTO: 3.3 K/UL — LOW (ref 3.8–10.5)

## 2018-08-17 PROCEDURE — 99232 SBSQ HOSP IP/OBS MODERATE 35: CPT

## 2018-08-17 PROCEDURE — 99233 SBSQ HOSP IP/OBS HIGH 50: CPT | Mod: GC

## 2018-08-17 PROCEDURE — 88173 CYTOPATH EVAL FNA REPORT: CPT | Mod: 26

## 2018-08-17 PROCEDURE — 88305 TISSUE EXAM BY PATHOLOGIST: CPT | Mod: 26

## 2018-08-17 PROCEDURE — 88312 SPECIAL STAINS GROUP 1: CPT | Mod: 26

## 2018-08-17 PROCEDURE — 99231 SBSQ HOSP IP/OBS SF/LOW 25: CPT

## 2018-08-17 PROCEDURE — 32405: CPT

## 2018-08-17 PROCEDURE — 88189 FLOWCYTOMETRY/READ 16 & >: CPT

## 2018-08-17 PROCEDURE — 77012 CT SCAN FOR NEEDLE BIOPSY: CPT | Mod: 26

## 2018-08-17 RX ORDER — VALGANCICLOVIR 450 MG/1
450 TABLET, FILM COATED ORAL
Qty: 0 | Refills: 0 | Status: DISCONTINUED | OUTPATIENT
Start: 2018-08-17 | End: 2018-08-17

## 2018-08-17 RX ORDER — VALGANCICLOVIR 450 MG/1
450 TABLET, FILM COATED ORAL
Qty: 0 | Refills: 0 | Status: DISCONTINUED | OUTPATIENT
Start: 2018-08-19 | End: 2018-08-19

## 2018-08-17 RX ADMIN — MAGNESIUM OXIDE 400 MG ORAL TABLET 400 MILLIGRAM(S): 241.3 TABLET ORAL at 18:07

## 2018-08-17 RX ADMIN — Medication 250 MILLIGRAM(S): at 06:02

## 2018-08-17 RX ADMIN — TACROLIMUS 0.5 MILLIGRAM(S): 5 CAPSULE ORAL at 08:05

## 2018-08-17 RX ADMIN — Medication 20 MILLIGRAM(S): at 22:23

## 2018-08-17 RX ADMIN — Medication 4 MILLIGRAM(S): at 08:05

## 2018-08-17 RX ADMIN — Medication 650 MILLIGRAM(S): at 05:16

## 2018-08-17 RX ADMIN — CEFEPIME 100 MILLIGRAM(S): 1 INJECTION, POWDER, FOR SOLUTION INTRAMUSCULAR; INTRAVENOUS at 05:16

## 2018-08-17 RX ADMIN — VORICONAZOLE 200 MILLIGRAM(S): 10 INJECTION, POWDER, LYOPHILIZED, FOR SOLUTION INTRAVENOUS at 05:16

## 2018-08-17 RX ADMIN — CEFEPIME 100 MILLIGRAM(S): 1 INJECTION, POWDER, FOR SOLUTION INTRAMUSCULAR; INTRAVENOUS at 22:23

## 2018-08-17 RX ADMIN — MAGNESIUM OXIDE 400 MG ORAL TABLET 400 MILLIGRAM(S): 241.3 TABLET ORAL at 08:05

## 2018-08-17 RX ADMIN — Medication 250 MILLIGRAM(S): at 20:04

## 2018-08-17 RX ADMIN — CEFEPIME 100 MILLIGRAM(S): 1 INJECTION, POWDER, FOR SOLUTION INTRAMUSCULAR; INTRAVENOUS at 14:37

## 2018-08-17 RX ADMIN — VALGANCICLOVIR 450 MILLIGRAM(S): 450 TABLET, FILM COATED ORAL at 08:05

## 2018-08-17 RX ADMIN — FAMOTIDINE 20 MILLIGRAM(S): 10 INJECTION INTRAVENOUS at 14:36

## 2018-08-17 RX ADMIN — Medication 650 MILLIGRAM(S): at 18:07

## 2018-08-17 RX ADMIN — Medication 1 TABLET(S): at 14:36

## 2018-08-17 RX ADMIN — VORICONAZOLE 200 MILLIGRAM(S): 10 INJECTION, POWDER, LYOPHILIZED, FOR SOLUTION INTRAVENOUS at 18:07

## 2018-08-17 RX ADMIN — TACROLIMUS 0.5 MILLIGRAM(S): 5 CAPSULE ORAL at 20:04

## 2018-08-17 NOTE — PROGRESS NOTE ADULT - PROBLEM SELECTOR PLAN 4
Stable. Likely secondary to low renal perfusion in a septic state as well as 2/2 tacrolimus   cont monitor   encouraged PO intake

## 2018-08-17 NOTE — PROGRESS NOTE ADULT - SUBJECTIVE AND OBJECTIVE BOX
INFECTIOUS DISEASES FOLLOW UP-- Sujey Lujan  [phone]    This is a follow up note for this  68yMale with s/p cardiac transplant six months ago, admitted for fevers/recurrent vs non resolving pneumonia  Undergoing a CT guided biopsy of the right sided infiltrate      ROS:  CONSTITUTIONAL:  improved fever, good appetite  CARDIOVASCULAR:  No chest pain or palpitations  RESPIRATORY:  No dyspnea  GASTROINTESTINAL:  No nausea, vomiting, diarrhea, or abdominal pain  GENITOURINARY:  No dysuria  NEUROLOGIC:  No headache,     Allergies    No Known Allergies    Intolerances        ANTIBIOTICS/RELEVANT:  antimicrobials  atovaquone Suspension 1500 milliGRAM(s) Oral daily  cefepime   IVPB      cefepime   IVPB 1000 milliGRAM(s) IV Intermittent every 8 hours  vancomycin  IVPB 750 milliGRAM(s) IV Intermittent every 12 hours  voriconazole 200 milliGRAM(s) Oral every 12 hours    immunologic:  tacrolimus 0.5 milliGRAM(s) Oral <User Schedule>  tacrolimus 0.5 milliGRAM(s) Oral <User Schedule>    OTHER:  acetaminophen    Suspension 500 milliGRAM(s) Oral every 6 hours PRN  aspirin enteric coated 81 milliGRAM(s) Oral daily  Calcium Citrate + Vit D 315mg/250 IU 2 Tablet(s) 2 Tablet(s) Oral <User Schedule>  docusate sodium 100 milliGRAM(s) Oral two times a day PRN  famotidine    Tablet 20 milliGRAM(s) Oral daily  insulin lispro (HumaLOG) corrective regimen sliding scale   SubCutaneous three times a day before meals  insulin lispro (HumaLOG) corrective regimen sliding scale   SubCutaneous at bedtime  magnesium oxide 400 milliGRAM(s) Oral two times a day with meals  multivitamin 1 Tablet(s) Oral daily  pravastatin 20 milliGRAM(s) Oral at bedtime  predniSONE   Tablet 4 milliGRAM(s) Oral <User Schedule>  sodium bicarbonate 650 milliGRAM(s) Oral two times a day      Objective:  Vital Signs Last 24 Hrs  T(C): 36.7 (17 Aug 2018 14:02), Max: 36.9 (17 Aug 2018 05:03)  T(F): 98.1 (17 Aug 2018 14:02), Max: 98.4 (17 Aug 2018 05:03)  HR: 102 (17 Aug 2018 14:02) (102 - 111)  BP: 107/69 (17 Aug 2018 14:02) (103/72 - 108/76)  BP(mean): --  RR: 18 (17 Aug 2018 14:02) (18 - 18)  SpO2: 94% (17 Aug 2018 14:02) (94% - 96%)    PHYSICAL EXAM:  Constitutional:no acute distress, temp curve improved on antibiotics  Eyes:WALT, EOMI  Ear/Nose/Throat: no oral lesions, 	  Respiratory: clear BL anteriorly decreased BS at bases  Cardiovascular: S1S2 sternotomy site healed  Gastrointestinal:soft, (+) BS, no tenderness  Extremities:no e/e/c  No Lymphadenopathy  IV sites not inflammed.    LABS:                        8.0    3.3   )-----------( 362      ( 17 Aug 2018 05:26 )             25.1     08-17    133<L>  |  105  |  60<H>  ----------------------------<  107<H>  4.6   |  18<L>  |  1.60<H>    Ca    8.7      17 Aug 2018 05:26  Mg     2.4     08-16    TPro  7.0  /  Alb  3.3  /  TBili  0.5  /  DBili  x   /  AST  33  /  ALT  16  /  AlkPhos  85  08-16    PTT - ( 17 Aug 2018 05:26 )  PTT:28.3 sec      MICROBIOLOGY:            RECENT CULTURES:  08-15 @ 02:27  .Urine Clean Catch (Midstream)  --  --  --    No growth  --  08-14 @ 21:27  .Blood Blood-Peripheral  --  --  --    No growth to date.  --  08-14 @ 21:11  .Abscess Arm - Right  --  --  --    No growth  --  08-14 @ 16:59  .Sputum Sputum  --  --  --    Normal Respiratory Heaven present  --      RADIOLOGY & ADDITIONAL STUDIES:    < from: CT Biopsy Lung/Mediastinum (08.17.18 @ 16:38) >    IMPRESSION:    CT-guided biopsy of right lung mass.    < end of copied text >

## 2018-08-17 NOTE — PROGRESS NOTE ADULT - ASSESSMENT
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 Also complains of decreased PO intake for the last few days.  Denies nausea, vomiting or diarrhea. Of note right wrist is slightly tender, warm to touch w/ fluctuance, has had wrist swelling on previous admission with negative workup. Temp in clinic was 102 - most likely secondary to recurrent PNA based on new productive cough x 3 days and RUL infiltrate on CT scan.  8/14 Called by lab> arthrocentesis fluid sent for cell count> state not enough fluid for count  8/15 Still febrile, Tmax 101.8. Sputum Cx with mod gram neg rods. Unable to broaden cefepime- pt with hx of colonized carbapenem pseudomonas per ID. IR consulted to evaluate for transthoracic bx of R lung  8/16 CT guided biopsy of Right lung scheduled for Friday with Dr. Alfred, continued on IV antibiotics. VSS, Pt afebrile Tmax 98.4. Tacro level 11.5  8/17 awaiting Rt Lung bx,  afebrile tacro level 10.6 valcyte decreased to every other day to start 8/19

## 2018-08-17 NOTE — PROGRESS NOTE ADULT - ASSESSMENT
68 year old man PMH NICM HFrEF s/p HM2 LVAD 6/2017, who underwent heart transplant on 2/23/18 (CMV D-/R+ and Toxo D-/R+, Hep C+ heart) with post op graft dysfunction who underwent plasmapheresis and IVIG x2 as well as rituximab, with suspected fungal PNA diagnosed 6/2018 on voriconazole who presented with fevers from outpt clinic appointment - secondary to recurrent PNA with new RUL infiltrate on CT scan - improving on broad spectrum antibiotics. Plan for lung biopsy today.

## 2018-08-17 NOTE — PROGRESS NOTE ADULT - PROBLEM SELECTOR PLAN 1
CT Chest with new right upper lobe consolidation consistent with pneumonia.  -will cont vancomycin, voriconazole and cefepime per ID  -valcyte changed to every other day  starting on 8/19  -f/u finalized sputum Cx results. 8/14 BCx and urine Cx negative to date  -Check daily CBC  -NPO  CT guided lung biopsy at 3pm Friday 8/17  -AM aspirin needs to be held per radiology for procedure

## 2018-08-17 NOTE — PROGRESS NOTE ADULT - PROBLEM SELECTOR PLAN 2
TTE reviewed and EF at baseline. RV func mildly improved.   tacro still therapeutic   will adjust accordingly   Given infection will decrease prednisone to 3mg qd   pt off cellcept due to hx of fungal infection - will cont hold   resumed valcyte at qod based on (GFR 44) and leukopenia   CAV: asa/pravastatin

## 2018-08-17 NOTE — PROGRESS NOTE ADULT - SUBJECTIVE AND OBJECTIVE BOX
Seen and examined. Resting comfortable. No acute events overnight  VITAL SIGNS    Telemetry:  SR/ST   Vital Signs Last 24 Hrs  T(C): 36.9 (18 @ 05:03), Max: 36.9 (18 @ 05:03)  T(F): 98.4 (18 @ 05:03), Max: 98.4 (18 @ 05:03)  HR: 111 (18 @ 05:03) (105 - 111)  BP: 103/72 (18 @ 05:03) (103/69 - 108/76)  RR: 18 (18 @ 05:03) (18 - 18)  SpO2: 94% (18 @ 05:03) (94% - 96%)             @ 07:01  -   @ 07:00  --------------------------------------------------------  IN: 970 mL / OUT: 0 mL / NET: 970 mL     @ 07:01  -   @ 13:20  --------------------------------------------------------  IN: 0 mL / OUT: 350 mL / NET: -350 mL       Daily     Daily Weight in k.1 (17 Aug 2018 08:12)  Admit Wt: Drug Dosing Weight  Height (cm): 172.72 (14 Aug 2018 12:54)  Weight (kg): 78.075 (14 Aug 2018 14:42)  BMI (kg/m2): 26.2 (14 Aug 2018 14:42)  BSA (m2): 1.92 (14 Aug 2018 14:42)      CAPILLARY BLOOD GLUCOSE      POCT Blood Glucose.: 120 mg/dL (17 Aug 2018 11:11)  POCT Blood Glucose.: 136 mg/dL (17 Aug 2018 07:17)  POCT Blood Glucose.: 100 mg/dL (16 Aug 2018 21:36)  POCT Blood Glucose.: 117 mg/dL (16 Aug 2018 17:07)          MEDICATIONS  acetaminophen    Suspension 500 milliGRAM(s) Oral every 6 hours PRN  aspirin enteric coated 81 milliGRAM(s) Oral daily  atovaquone Suspension 1500 milliGRAM(s) Oral daily  Calcium Citrate + Vit D 315mg/250 IU 2 Tablet(s) 2 Tablet(s) Oral <User Schedule>  cefepime   IVPB      cefepime   IVPB 1000 milliGRAM(s) IV Intermittent every 8 hours  docusate sodium 100 milliGRAM(s) Oral two times a day PRN  famotidine    Tablet 20 milliGRAM(s) Oral daily  insulin lispro (HumaLOG) corrective regimen sliding scale   SubCutaneous three times a day before meals  insulin lispro (HumaLOG) corrective regimen sliding scale   SubCutaneous at bedtime  magnesium oxide 400 milliGRAM(s) Oral two times a day with meals  multivitamin 1 Tablet(s) Oral daily  pravastatin 20 milliGRAM(s) Oral at bedtime  predniSONE   Tablet 4 milliGRAM(s) Oral <User Schedule>  sodium bicarbonate 650 milliGRAM(s) Oral two times a day  tacrolimus 0.5 milliGRAM(s) Oral <User Schedule>  tacrolimus 0.5 milliGRAM(s) Oral <User Schedule>  vancomycin  IVPB 750 milliGRAM(s) IV Intermittent every 12 hours  voriconazole 200 milliGRAM(s) Oral every 12 hours      PHYSICAL EXAM    Subjective: HELLO   Neurology: alert and oriented x 3, nonfocal, no gross deficits  CV : s1s1 reg no MRGS  Sternal Wound :  CDI , Stable healed   Lungs: CTA, equal chest expansion  Abdomen: soft, nontender, nondistended, positive bowel sounds, last bowel movement   :    voids  Extremities:   upper extremities  deformities, Rt middle digit  under pad necrotic and distal joint swollen, Rt  wrist swollen, no legs edema,    b/l groins no hematoma, distal pulses  b/l , no calf tenderness b/l , warm and perfused b/l    LABS      133<L>  |  105  |  60<H>  ----------------------------<  107<H>  4.6   |  18<L>  |  1.60<H>    Ca    8.7      17 Aug 2018 05:26  Mg     2.4     08-16    TPro  7.0  /  Alb  3.3  /  TBili  0.5  /  DBili  x   /  AST  33  /  ALT  16  /  AlkPhos  85  08-                                 8.0    3.3   )-----------( 362      ( 17 Aug 2018 05:26 )             25.1          PTT - ( 17 Aug 2018 05:26 )  PTT:28.3 sec         PAST MEDICAL & SURGICAL HISTORY:  DVT of upper extremity (deep vein thrombosis)  Hepatitis C virus  GIB (gastrointestinal bleeding)  Ventricular fibrillation: s/p AICD  PAF (paroxysmal atrial fibrillation): on xarelto  Non-Ischemic Cardiomyopathy  SVT (Supraventricular Tachycardia)  HTN  CHF (Congestive Heart Failure)  S/P right heart catheterization: biopsy multiple  H/O heart transplant: 2018  Status post left hip replacement  History of Prior Ablation Treatment: for afib  AICD (Automatic Cardioverter/Defibrillator) Present: [employer] with 1 [employer] lead09- explanted and replaced with [employer] 2 leads on 09       Physical Therapy Rec:   Home  [  ]   Home w/ PT  [x  ]  Rehab  [  ]

## 2018-08-17 NOTE — PROGRESS NOTE ADULT - PROBLEM SELECTOR PLAN 1
Pt with h/o colonized carbapenem resistant pseudomonas   will cont vanc, voriconazole and cefepime per ID   RVP neg, U legionella neg, MRSA PCR neg   Sputum with GNR - normal respiratory jose   Pt defervesced - last temp 8/5 am   BCx - NGTD, UA neg, CMV PCR neg, U legionella neg   quant gold indeterminate - f/u ID   f/u UCx, fungitel, aspergilosus galactomannon, U histo ag   plan for lung tissue biopsy today with IR   arthrocentesis of R wrist - culture No growth Pt with h/o colonized carbapenem resistant pseudomonas   will cont vanc, voriconazole, and cefepime per ID   RVP neg, U legionella neg, MRSA PCR neg   Sputum with GNR - normal respiratory jose   Pt defervesced  BCx - NGTD, UA neg, CMV PCR neg, U legionella neg   quant gold indeterminate - f/u ID   f/u UCx, fungitel, aspergilosus galactomannon, U histo ag   plan for lung tissue biopsy today with IR   arthrocentesis of R wrist - culture No growth

## 2018-08-17 NOTE — PROGRESS NOTE ADULT - SUBJECTIVE AND OBJECTIVE BOX
Pt laying in bed. Denies CP, SOB. cough minimally improved.     MEDICATIONS:  aspirin enteric coated 81 milliGRAM(s) Oral daily    atovaquone Suspension 1500 milliGRAM(s) Oral daily  cefepime   IVPB      cefepime   IVPB 1000 milliGRAM(s) IV Intermittent every 8 hours  valGANciclovir 450 milliGRAM(s) Oral <User Schedule>  vancomycin  IVPB 750 milliGRAM(s) IV Intermittent every 12 hours  voriconazole 200 milliGRAM(s) Oral every 12 hours      acetaminophen    Suspension 500 milliGRAM(s) Oral every 6 hours PRN    docusate sodium 100 milliGRAM(s) Oral two times a day PRN  famotidine    Tablet 20 milliGRAM(s) Oral daily    insulin lispro (HumaLOG) corrective regimen sliding scale   SubCutaneous three times a day before meals  insulin lispro (HumaLOG) corrective regimen sliding scale   SubCutaneous at bedtime  pravastatin 20 milliGRAM(s) Oral at bedtime  predniSONE   Tablet 4 milliGRAM(s) Oral <User Schedule>    magnesium oxide 400 milliGRAM(s) Oral two times a day with meals  multivitamin 1 Tablet(s) Oral daily  sodium bicarbonate 650 milliGRAM(s) Oral two times a day  tacrolimus 0.5 milliGRAM(s) Oral <User Schedule>  tacrolimus 0.5 milliGRAM(s) Oral <User Schedule>    PHYSICAL EXAM:  T(C): 36.9 (08-17-18 @ 05:03), Max: 36.9 (08-17-18 @ 05:03)  HR: 111 (08-17-18 @ 05:03) (105 - 111)  BP: 103/72 (08-17-18 @ 05:03) (103/69 - 108/76)  RR: 18 (08-17-18 @ 05:03) (18 - 18)  SpO2: 94% (08-17-18 @ 05:03) (94% - 96%)  Wt(kg): --  I&O's Summary    16 Aug 2018 07:01  -  17 Aug 2018 07:00  --------------------------------------------------------  IN: 970 mL / OUT: 0 mL / NET: 970 mL    17 Aug 2018 07:01  -  17 Aug 2018 11:18  --------------------------------------------------------  IN: 0 mL / OUT: 350 mL / NET: -350 mL    Appearance: Normal, NAD   HEENT:   Normal oral mucosa, PERRL, EOMI	  Lymphatic: No lymphadenopathy  Cardiovascular: Normal S1 S2, No JVD, No murmurs, No edema  Respiratory: Decreased LLL   Psychiatry: A & O x 3, Mood & affect appropriate  Gastrointestinal:  Soft, Non-tender, + BS	  Skin: No rashes, No ecchymoses, No cyanosis	  L 5th digit with min tenderness and swelling, min warmth   Neurologic: Non-focal  Index finger of right hand with dry necrotic digit tip   Extremities: Normal range of motion, No clubbing, cyanosis or edema  Vascular: Peripheral pulses palpable bilaterally    LABS:	 	    CBC Full  -  ( 17 Aug 2018 05:26 )  WBC Count : 3.3 K/uL  Hemoglobin : 8.0 g/dL  Hematocrit : 25.1 %  Platelet Count - Automated : 362 K/uL  Mean Cell Volume : 92.4 fl  Mean Cell Hemoglobin : 29.6 pg  Mean Cell Hemoglobin Concentration : 32.0 gm/dL  Auto Neutrophil # : x  Auto Lymphocyte # : x  Auto Monocyte # : x  Auto Eosinophil # : x  Auto Basophil # : x  Auto Neutrophil % : x  Auto Lymphocyte % : x  Auto Monocyte % : x  Auto Eosinophil % : x  Auto Basophil % : x    08-17    133<L>  |  105  |  60<H>  ----------------------------<  107<H>  4.6   |  18<L>  |  1.60<H>  08-16    133<L>  |  100  |  58<H>  ----------------------------<  181<H>  4.3   |  18<L>  |  1.68<H>    Ca    8.7      17 Aug 2018 05:26  Ca    8.5      16 Aug 2018 10:01  Mg     2.4     08-16    TPro  7.0  /  Alb  3.3  /  TBili  0.5  /  DBili  x   /  AST  33  /  ALT  16  /  AlkPhos  85  08-16    tele: 100 to 120s

## 2018-08-17 NOTE — PROGRESS NOTE ADULT - ASSESSMENT
67 yo M s/p heart transplant complicated by rejection with rounds of plasmaphoresis , IVIG, rituximab x2, known colinizer with  Pseudomonas last treated for PNA in June 2018 with cefepime and voriconazole now presents with fever, decreased PO intake, productive cough. Ddx include recurrent pneumonia vs gout flare  vs other infectious process.     Suggest:  -CT chest/abd/pelvis without contrast showing new right lobe pneumonia with residual left sided infiltrate   -continue cefepime 1g q8hrs   -continue vancomycin 750mg q12hrs with daily troughs   -CT guided biopsy performed- sent for culture gram stain, fungal culture and stain and mycobacterial cx. and stain    Complaining of diarrhea- loose BMS x3 times today  would send a stool C.diff    -Xray of right wrist   -negative arthrocentesis for gout   -f/u on synovial fluid culture is NGTD    -continue Valganciclovir  -continue Voriconazole  -HCV viral load was negative post treatment        Gary Lujan MD  [phone]  After 5pm/weekends [phone] 67 yo M s/p heart transplant complicated by rejection with rounds of plasmaphoresis , IVIG, rituximab x2, known colinizer with  Pseudomonas last treated for PNA in June 2018 with cefepime and voriconazole now presents with fever, decreased PO intake, productive cough. Ddx include recurrent pneumonia vs gout flare  vs other infectious process.     Suggest:  -CT chest/abd/pelvis without contrast showing new right lobe pneumonia with residual left sided infiltrate   -continue cefepime 1g q8hrs   -continue vancomycin 750mg q12hrs with daily troughs   -CT guided biopsy performed- sent for culture gram stain, fungal culture and stain and mycobacterial cx. and stain  Follow up biopsy results as they become available    Complaining of diarrhea- loose BMS x3 times today  would send a stool C.diff    -Xray of right wrist   -negative arthrocentesis for gout   -f/u on synovial fluid culture is NGTD    -continue Valganciclovir  -continue Voriconazole  -HCV viral load was negative post treatment        Gary Lujan MD  [phone]  After 5pm/weekends [phone]

## 2018-08-17 NOTE — PROGRESS NOTE ADULT - PROBLEM SELECTOR PLAN 2
Tacro level 10.6 today, check daily tacrolimus level   will continue tacro 0.5 BID  continue prednisone 4 mg qd  valcyte changed to every other day  starting on 8/19  pt off cellcept due to hx of fungal infection - will cont hold   Continue asa/rouvastatin for CAD  Rheum consult placed for  Rt upper extremity joints aches and swelling

## 2018-08-17 NOTE — PROGRESS NOTE ADULT - PROBLEM SELECTOR PLAN 6
Pt without pain but admitted with joint swelling of R wrist (no crystals present)   and now with L 5th digit swelling and mild warmth   Xray R hand c/w inflammatory arthritis   Rheum consult

## 2018-08-18 LAB
ANION GAP SERPL CALC-SCNC: 12 MMOL/L — SIGNIFICANT CHANGE UP (ref 5–17)
BUN SERPL-MCNC: 44 MG/DL — HIGH (ref 7–23)
C DIFF GDH STL QL: POSITIVE — SIGNIFICANT CHANGE UP
C DIFF GDH STL QL: SIGNIFICANT CHANGE UP
CALCIUM SERPL-MCNC: 9.1 MG/DL — SIGNIFICANT CHANGE UP (ref 8.4–10.5)
CHLORIDE SERPL-SCNC: 101 MMOL/L — SIGNIFICANT CHANGE UP (ref 96–108)
CO2 SERPL-SCNC: 19 MMOL/L — LOW (ref 22–31)
CREAT SERPL-MCNC: 1.25 MG/DL — SIGNIFICANT CHANGE UP (ref 0.5–1.3)
GLUCOSE BLDC GLUCOMTR-MCNC: 123 MG/DL — HIGH (ref 70–99)
GLUCOSE BLDC GLUCOMTR-MCNC: 141 MG/DL — HIGH (ref 70–99)
GLUCOSE BLDC GLUCOMTR-MCNC: 148 MG/DL — HIGH (ref 70–99)
GLUCOSE BLDC GLUCOMTR-MCNC: 84 MG/DL — SIGNIFICANT CHANGE UP (ref 70–99)
GLUCOSE SERPL-MCNC: 138 MG/DL — HIGH (ref 70–99)
GRAM STN FLD: SIGNIFICANT CHANGE UP
HCT VFR BLD CALC: 24.4 % — LOW (ref 39–50)
HGB BLD-MCNC: 7.8 G/DL — LOW (ref 13–17)
MAGNESIUM SERPL-MCNC: 2.3 MG/DL — SIGNIFICANT CHANGE UP (ref 1.6–2.6)
MCHC RBC-ENTMCNC: 29.5 PG — SIGNIFICANT CHANGE UP (ref 27–34)
MCHC RBC-ENTMCNC: 32.1 GM/DL — SIGNIFICANT CHANGE UP (ref 32–36)
MCV RBC AUTO: 91.9 FL — SIGNIFICANT CHANGE UP (ref 80–100)
PLATELET # BLD AUTO: 395 K/UL — SIGNIFICANT CHANGE UP (ref 150–400)
POTASSIUM SERPL-MCNC: 4.5 MMOL/L — SIGNIFICANT CHANGE UP (ref 3.5–5.3)
POTASSIUM SERPL-SCNC: 4.5 MMOL/L — SIGNIFICANT CHANGE UP (ref 3.5–5.3)
RBC # BLD: 2.66 M/UL — LOW (ref 4.2–5.8)
RBC # FLD: 20.1 % — HIGH (ref 10.3–14.5)
SODIUM SERPL-SCNC: 132 MMOL/L — LOW (ref 135–145)
SPECIMEN SOURCE: SIGNIFICANT CHANGE UP
TACROLIMUS SERPL-MCNC: 8.9 NG/ML — SIGNIFICANT CHANGE UP
TACROLIMUS SERPL-MCNC: 9.1 NG/ML — SIGNIFICANT CHANGE UP
WBC # BLD: 3 K/UL — LOW (ref 3.8–10.5)
WBC # FLD AUTO: 3 K/UL — LOW (ref 3.8–10.5)

## 2018-08-18 PROCEDURE — 71045 X-RAY EXAM CHEST 1 VIEW: CPT | Mod: 26

## 2018-08-18 PROCEDURE — 99233 SBSQ HOSP IP/OBS HIGH 50: CPT | Mod: GC

## 2018-08-18 RX ORDER — METRONIDAZOLE 500 MG
500 TABLET ORAL EVERY 8 HOURS
Qty: 0 | Refills: 0 | Status: DISCONTINUED | OUTPATIENT
Start: 2018-08-18 | End: 2018-08-27

## 2018-08-18 RX ORDER — VANCOMYCIN HCL 1 G
125 VIAL (EA) INTRAVENOUS EVERY 6 HOURS
Qty: 0 | Refills: 0 | Status: DISCONTINUED | OUTPATIENT
Start: 2018-08-18 | End: 2018-08-29

## 2018-08-18 RX ORDER — METRONIDAZOLE 500 MG
500 TABLET ORAL ONCE
Qty: 0 | Refills: 0 | Status: COMPLETED | OUTPATIENT
Start: 2018-08-18 | End: 2018-08-18

## 2018-08-18 RX ORDER — METRONIDAZOLE 500 MG
TABLET ORAL
Qty: 0 | Refills: 0 | Status: DISCONTINUED | OUTPATIENT
Start: 2018-08-18 | End: 2018-08-27

## 2018-08-18 RX ADMIN — Medication 100 MILLIGRAM(S): at 23:16

## 2018-08-18 RX ADMIN — TACROLIMUS 0.5 MILLIGRAM(S): 5 CAPSULE ORAL at 08:03

## 2018-08-18 RX ADMIN — MAGNESIUM OXIDE 400 MG ORAL TABLET 400 MILLIGRAM(S): 241.3 TABLET ORAL at 17:26

## 2018-08-18 RX ADMIN — Medication 650 MILLIGRAM(S): at 17:26

## 2018-08-18 RX ADMIN — Medication 250 MILLIGRAM(S): at 05:57

## 2018-08-18 RX ADMIN — Medication 20 MILLIGRAM(S): at 23:16

## 2018-08-18 RX ADMIN — TACROLIMUS 0.5 MILLIGRAM(S): 5 CAPSULE ORAL at 20:06

## 2018-08-18 RX ADMIN — VORICONAZOLE 200 MILLIGRAM(S): 10 INJECTION, POWDER, LYOPHILIZED, FOR SOLUTION INTRAVENOUS at 17:26

## 2018-08-18 RX ADMIN — Medication 250 MILLIGRAM(S): at 18:47

## 2018-08-18 RX ADMIN — FAMOTIDINE 20 MILLIGRAM(S): 10 INJECTION INTRAVENOUS at 13:00

## 2018-08-18 RX ADMIN — CEFEPIME 100 MILLIGRAM(S): 1 INJECTION, POWDER, FOR SOLUTION INTRAMUSCULAR; INTRAVENOUS at 13:00

## 2018-08-18 RX ADMIN — Medication 100 MILLIGRAM(S): at 17:26

## 2018-08-18 RX ADMIN — CEFEPIME 100 MILLIGRAM(S): 1 INJECTION, POWDER, FOR SOLUTION INTRAMUSCULAR; INTRAVENOUS at 05:18

## 2018-08-18 RX ADMIN — MAGNESIUM OXIDE 400 MG ORAL TABLET 400 MILLIGRAM(S): 241.3 TABLET ORAL at 08:04

## 2018-08-18 RX ADMIN — Medication 4 MILLIGRAM(S): at 08:04

## 2018-08-18 RX ADMIN — Medication 125 MILLIGRAM(S): at 17:26

## 2018-08-18 RX ADMIN — CEFEPIME 100 MILLIGRAM(S): 1 INJECTION, POWDER, FOR SOLUTION INTRAMUSCULAR; INTRAVENOUS at 23:16

## 2018-08-18 RX ADMIN — VORICONAZOLE 200 MILLIGRAM(S): 10 INJECTION, POWDER, LYOPHILIZED, FOR SOLUTION INTRAVENOUS at 05:19

## 2018-08-18 RX ADMIN — ATOVAQUONE 1500 MILLIGRAM(S): 750 SUSPENSION ORAL at 13:01

## 2018-08-18 RX ADMIN — Medication 81 MILLIGRAM(S): at 13:00

## 2018-08-18 RX ADMIN — Medication 650 MILLIGRAM(S): at 05:19

## 2018-08-18 RX ADMIN — Medication 1 TABLET(S): at 13:00

## 2018-08-18 RX ADMIN — Medication 125 MILLIGRAM(S): at 23:19

## 2018-08-18 NOTE — PROGRESS NOTE ADULT - PROBLEM SELECTOR PLAN 2
Tacro level 10.6 today, check daily tacrolimus level   C ontinue tacro 0.5 BID  Continue with prednisone 4 mg qd  Increase activity as tolerated   valcyte changed to every other day  starting on 8/19  pt off cellcept due to hx of fungal infection - will cont hold   Continue ASA 81 and  Pravastatin 20 mg for CAD  Rheum consult placed for Rt upper extremity joints aches and swelling Tacro level 8.6 today, check daily tacrolimus level   Continue tacro 0.5 BID  Decreased prednisone 3 mg qd  Increase activity as tolerated   valcyte changed to every other day  starting on 8/19  pt off cellcept due to hx of fungal infection - will cont hold   Continue ASA 81 and  Pravastatin 20 mg for CAD  Rheum consult placed for Rt upper extremity joints aches and swelling

## 2018-08-18 NOTE — PROGRESS NOTE ADULT - ASSESSMENT
68 year old man PMH NICM HFrEF s/p HM2 LVAD 6/2017, who underwent heart transplant on 2/23/18 (CMV D-/R+ and Toxo D-/R+, Hep C+ heart) with post op graft dysfunction who underwent plasmapheresis and IVIG x2 as well as rituximab, with suspected fungal PNA diagnosed 6/2018 on voriconazole who presented with fevers from outpt clinic appointment - secondary to recurrent PNA with new RUL infiltrate on CT scan - improving on broad spectrum antibiotics. S/p lung biopsy 8/18.

## 2018-08-18 NOTE — PROGRESS NOTE ADULT - PROBLEM SELECTOR PLAN 1
Pt with h/o colonized carbapenem resistant pseudomonas   will cont vanc, voriconazole, and cefepime per ID   RVP neg, U legionella neg, MRSA PCR neg   Sputum with GNR - normal respiratory jose   Pt defervesced  BCx - NGTD, UA neg, CMV PCR neg, U legionella neg   quant gold indeterminate - f/u ID   f/u UCx, fungitel, aspergilosus galactomannon, U histo ag   plan for lung tissue biopsy today with IR   arthrocentesis of R wrist - culture No growth Pt with h/o colonized carbapenem resistant pseudomonas   will cont vanc, voriconazole, and cefepime per ID   RVP neg, U legionella neg, MRSA PCR neg   Sputum with GNR - normal respiratory jose   Pt defervesced  BCx - NGTD, UA neg, CMV PCR neg, U legionella neg   quant gold indeterminate - f/u ID   f/u UCx, fungitel, aspergillosis galactomannon, U histo ag   plan for lung tissue biopsy today with IR   arthrocentesis of R wrist - culture: no growth

## 2018-08-18 NOTE — PROGRESS NOTE ADULT - PROBLEM SELECTOR PLAN 1
CT Chest with new right upper lobe consolidation consistent with pneumonia.  Continue with vancomycin, voriconazole and cefepime per ID  Continue with Valcyte changed to every other day  starting on 8/19  f/u finalized sputum Cx results. 8/14 BCx and urine Cx negative to date  Check daily CBC  Awaiting CT guided lung biopsy results

## 2018-08-18 NOTE — PROGRESS NOTE ADULT - SUBJECTIVE AND OBJECTIVE BOX
24H hour events: No acute events overnight, laying in bed comfortably.     MEDICATIONS:  aspirin enteric coated 81 milliGRAM(s) Oral daily  atovaquone Suspension 1500 milliGRAM(s) Oral daily  cefepime   IVPB      cefepime   IVPB 1000 milliGRAM(s) IV Intermittent every 8 hours  vancomycin  IVPB 750 milliGRAM(s) IV Intermittent every 12 hours  voriconazole 200 milliGRAM(s) Oral every 12 hours  acetaminophen    Suspension 500 milliGRAM(s) Oral every 6 hours PRN  docusate sodium 100 milliGRAM(s) Oral two times a day PRN  famotidine    Tablet 20 milliGRAM(s) Oral daily  insulin lispro (HumaLOG) corrective regimen sliding scale   SubCutaneous three times a day before meals  insulin lispro (HumaLOG) corrective regimen sliding scale   SubCutaneous at bedtime  pravastatin 20 milliGRAM(s) Oral at bedtime  predniSONE   Tablet 4 milliGRAM(s) Oral <User Schedule>  magnesium oxide 400 milliGRAM(s) Oral two times a day with meals  multivitamin 1 Tablet(s) Oral daily  sodium bicarbonate 650 milliGRAM(s) Oral two times a day  tacrolimus 0.5 milliGRAM(s) Oral <User Schedule>  tacrolimus 0.5 milliGRAM(s) Oral <User Schedule>    REVIEW OF SYSTEMS:  Complete 10point ROS negative except as documented above.    PHYSICAL EXAM:  T(C): 36.7 (08-18-18 @ 05:20), Max: 36.7 (08-17-18 @ 14:02)  HR: 102 (08-18-18 @ 05:20) (102 - 105)  BP: 106/75 (08-18-18 @ 05:20) (106/75 - 120/80)  RR: 18 (08-18-18 @ 05:20) (18 - 18)  SpO2: 100% (08-18-18 @ 05:20) (94% - 100%)  Wt(kg): --  I&O's Summary    17 Aug 2018 07:01  -  18 Aug 2018 07:00  --------------------------------------------------------  IN: 990 mL / OUT: 850 mL / NET: 140 mL    Appearance: Normal	  HEENT:   Normal oral mucosa, PERRL, EOMI	  Lymphatic: No lymphadenopathy  Cardiovascular: Normal S1 S2, JVP 5, No murmurs, No edema  Respiratory: Lungs clear to auscultation	  Psychiatry: A & O x 3, Mood & affect appropriate  Gastrointestinal:  Soft, Non-tender, + BS	  Skin: No rashes, No ecchymoses, No cyanosis	  Neurologic: Non-focal  Extremities: Normal range of motion, No clubbing, cyanosis or edema  Vascular: Peripheral pulses palpable 2+ bilaterally    LABS:	 	    CBC Full  -  ( 17 Aug 2018 05:26 )  WBC Count : 3.3 K/uL  Hemoglobin : 8.0 g/dL  Hematocrit : 25.1 %  Platelet Count - Automated : 362 K/uL  Mean Cell Volume : 92.4 fl  Mean Cell Hemoglobin : 29.6 pg  Mean Cell Hemoglobin Concentration : 32.0 gm/dL  Auto Neutrophil # : x  Auto Lymphocyte # : x  Auto Monocyte # : x  Auto Eosinophil # : x  Auto Basophil # : x  Auto Neutrophil % : x  Auto Lymphocyte % : x  Auto Monocyte % : x  Auto Eosinophil % : x  Auto Basophil % : x    08-17    133<L>  |  105  |  60<H>  ----------------------------<  107<H>  4.6   |  18<L>  |  1.60<H>  08-16    133<L>  |  100  |  58<H>  ----------------------------<  181<H>  4.3   |  18<L>  |  1.68<H>    Ca    8.7      17 Aug 2018 05:26  Ca    8.5      16 Aug 2018 10:01  Mg     2.4     08-16    TPro  7.0  /  Alb  3.3  /  TBili  0.5  /  DBili  x   /  AST  33  /  ALT  16  /  AlkPhos  85  08-16    TELEMETRY: sinus 80s	    ECG:  	  RADIOLOGY:  OTHER: 	    PREVIOUS DIAGNOSTIC TESTING:    [ ] Echocardiogram:  [ ]  Catheterization:  [ ] Stress Test:  	  	  ASSESSMENT/PLAN:

## 2018-08-18 NOTE — PROGRESS NOTE ADULT - SUBJECTIVE AND OBJECTIVE BOX
VITAL SIGNS    Subjective: "I'm feeling better today."  Denies CP, palpitation, SOB, LOBATO, HA, dizziness, N/V/D, fever or chills. No acute event noted overnight.     Telemetry:  NSR / ST   (PVC's / Couplets)    Vital Signs Last 24 Hrs  T(C): 36.7 (08-18-18 @ 05:20), Max: 36.7 (08-17-18 @ 14:02)  T(F): 98 (08-18-18 @ 05:20), Max: 98.1 (08-17-18 @ 14:02)  HR: 102 (08-18-18 @ 05:20) (102 - 105)  BP: 106/75 (08-18-18 @ 05:20) (106/75 - 120/80)  RR: 18 (08-18-18 @ 05:20) (18 - 18)  SpO2: 100% (08-18-18 @ 05:20) (94% - 100%)           08-17 @ 07:01  -  08-18 @ 07:00  --------------------------------------------------------  IN: 990 mL / OUT: 850 mL / NET: 140 mL    08-18 @ 07:01  -  08-18 @ 12:47  --------------------------------------------------------  IN: 200 mL / OUT: 0 mL / NET: 200 mL    CAPILLARY BLOOD GLUCOSE    POCT Blood Glucose.: 141 mg/dL (18 Aug 2018 11:24)  POCT Blood Glucose.: 148 mg/dL (18 Aug 2018 07:35)  POCT Blood Glucose.: 118 mg/dL (17 Aug 2018 21:45)  POCT Blood Glucose.: 107 mg/dL (17 Aug 2018 19:24)                      PHYSICAL EXAM    Neurology: alert and oriented x 3, nonfocal, no gross deficits    CV: (+) S1 and S2, No murmurs, rubs, gallops or clicks     Lungs: CTA B/L     Abdomen: soft, nontender, nondistended, positive bowel sounds, (+) Flatus; (+) BM     :  Voiding               Extremities:  B/L LE (-) edema; negative calf tenderness; (+) 2 DP palpable        acetaminophen Suspension 500 milliGRAM(s) Oral every 6 hours PRN  aspirin enteric coated 81 milliGRAM(s) Oral daily  atovaquone Suspension 1500 milliGRAM(s) Oral daily  Calcium Citrate + Vit D 315mg/250 IU 2 Tablet(s) 2 Tablet(s) Oral <User Schedule>  cefepime IVPB      cefepime IVPB 1000 milliGRAM(s) IV Intermittent every 8 hours  docusate sodium 100 milliGRAM(s) Oral two times a day PRN  famotidine Tablet 20 milliGRAM(s) Oral daily  insulin lispro (HumaLOG) corrective regimen sliding scale   SubCutaneous three times a day before meals  insulin lispro (HumaLOG) corrective regimen sliding scale   SubCutaneous at bedtime  magnesium oxide 400 milliGRAM(s) Oral two times a day with meals  multivitamin 1 Tablet(s) Oral daily  pravastatin 20 milliGRAM(s) Oral at bedtime  predniSONE Tablet 4 milliGRAM(s) Oral <User Schedule>  sodium bicarbonate 650 milliGRAM(s) Oral two times a day  tacrolimus 0.5 milliGRAM(s) Oral <User Schedule>  tacrolimus 0.5 milliGRAM(s) Oral <User Schedule>  vancomycin  IVPB 750 milliGRAM(s) IV Intermittent every 12 hours  voriconazole 200 milliGRAM(s) Oral every 12 hours    Physical Therapy Rec:   Home  [  ]   Home w/ PT  [ X ]  Rehab  [  ]    Discussed with Cardiothoracic Team at AM rounds.

## 2018-08-18 NOTE — PROGRESS NOTE ADULT - PROBLEM SELECTOR PLAN 2
TTE reviewed and EF at baseline. RV func mildly improved.   tacro still therapeutic as of 10.6 yesterday, goal 8-10. Level pending today.   will adjust accordingly  Given infection will decrease prednisone to 3mg qd   pt off cellcept due to hx of fungal infection - will cont hold   resumed valcyte at qod based on (GFR 44) and leukopenia   CAV: asa/pravastatin

## 2018-08-18 NOTE — PROGRESS NOTE ADULT - PROBLEM SELECTOR PLAN 6
Pt without pain but admitted with joint swelling of R wrist (no crystals present)   and now with L 5th digit swelling and mild warmth   Xray R hand c/w inflammatory arthritis   Rheum consult Pt without pain but admitted with joint swelling of R wrist (no crystals present)   and now with L 5th digit swelling and mild warmth   X-ray R hand c/w inflammatory arthritis   Rheum consult

## 2018-08-18 NOTE — PROGRESS NOTE ADULT - ASSESSMENT
67 y/o male with PMH NICM HFrEF s/p HM2 LVAD 6/2017, who underwent heart transplant on 2/23/18 (CMV D-/R+ and Toxo D-/R+, Hep C+ heart) with post op graft dysfunction who underwent plasmapheresis and IVIG x2 as well as rituximab, with suspected fungal PNA diagnosed 6/2018 on voriconazole, who presented to clinic today after routine blood work, stating he is not feeling well this morning, feels weak, no chills, no reported fever, denies chest pain or sob. Also complains of decreased PO intake for the last few days.  Denies nausea, vomiting or diarrhea. Of note right wrist is slightly tender, warm to touch w/ fluctuance, has had wrist swelling on previous admission with negative workup. Temp in clinic was 102 - most likely secondary to recurrent PNA based on new productive cough x 3 days and RUL infiltrate on CT scan.  8/14 Called by lab> arthrocentesis fluid sent for cell count> state not enough fluid for count  8/15 Still febrile, Tmax 101.8. Sputum Cx with mod gram neg rods. Unable to broaden cefepime- pt with hx of colonized carbapenem pseudomonas per ID. IR consulted to evaluate for transthoracic bx of R lung  8/16 CT guided biopsy of Right lung scheduled for Friday with Dr. Alfred, continued on IV antibiotics. VSS, Pt afebrile Tmax 98.4. Tacro level 11.5  8/17 awaiting Rt Lung bx, afebrile tacro level 10.6 valcyte decreased to every other day to start 8/19.   8/18 VVS; Right lung bx: Gram Stain: polymorphonuclear leukocytes seen. No organisms seen. Continue with current medication regimen.   Disposition: Home once medically cleared. 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 Also complains of decreased PO intake for the last few days.  Denies nausea, vomiting or diarrhea. Of note right wrist is slightly tender, warm to touch w/ fluctuance, has had wrist swelling on previous admission with negative workup. Temp in clinic was 102 - most likely secondary to recurrent PNA based on new productive cough x 3 days and RUL infiltrate on CT scan.  8/14 Called by lab> arthrocentesis fluid sent for cell count> state not enough fluid for count  8/15 Still febrile, Tmax 101.8. Sputum Cx with mod gram neg rods. Unable to broaden cefepime- pt with hx of colonized carbapenem pseudomonas per ID. IR consulted to evaluate for transthoracic bx of R lung  8/16 CT guided biopsy of Right lung scheduled for Friday with Dr. Alfred, continued on IV antibiotics. VSS, Pt afebrile Tmax 98.4. Tacro level 11.5  8/17 awaiting Rt Lung bx, afebrile tacro level 10.6 valcyte decreased to every other day to start 8/19.   8/18 VVS; Right lung bx: Gram Stain: polymorphonuclear leukocytes seen. No organisms seen. Prednisone decreased to 3 mg PO daily as per CHF 2/2 infectious process. Continue with current medication regimen.   Disposition: Home once medically cleared.

## 2018-08-18 NOTE — PROGRESS NOTE ADULT - PROBLEM SELECTOR PLAN 7
pt remains off cellcept   will decrease valcyte dose to every other day to start on 8/19 pt remains off cellcept   will decrease Valcyte dose to every other day to start on 8/19

## 2018-08-19 LAB
ANION GAP SERPL CALC-SCNC: 11 MMOL/L — SIGNIFICANT CHANGE UP (ref 5–17)
BUN SERPL-MCNC: 35 MG/DL — HIGH (ref 7–23)
CALCIUM SERPL-MCNC: 8.9 MG/DL — SIGNIFICANT CHANGE UP (ref 8.4–10.5)
CHLORIDE SERPL-SCNC: 107 MMOL/L — SIGNIFICANT CHANGE UP (ref 96–108)
CO2 SERPL-SCNC: 20 MMOL/L — LOW (ref 22–31)
CREAT SERPL-MCNC: 1.11 MG/DL — SIGNIFICANT CHANGE UP (ref 0.5–1.3)
CULTURE RESULTS: SIGNIFICANT CHANGE UP
GLUCOSE BLDC GLUCOMTR-MCNC: 103 MG/DL — HIGH (ref 70–99)
GLUCOSE BLDC GLUCOMTR-MCNC: 117 MG/DL — HIGH (ref 70–99)
GLUCOSE BLDC GLUCOMTR-MCNC: 146 MG/DL — HIGH (ref 70–99)
GLUCOSE BLDC GLUCOMTR-MCNC: 98 MG/DL — SIGNIFICANT CHANGE UP (ref 70–99)
GLUCOSE SERPL-MCNC: 112 MG/DL — HIGH (ref 70–99)
HCT VFR BLD CALC: 25.6 % — LOW (ref 39–50)
HGB BLD-MCNC: 8.2 G/DL — LOW (ref 13–17)
MCHC RBC-ENTMCNC: 29.5 PG — SIGNIFICANT CHANGE UP (ref 27–34)
MCHC RBC-ENTMCNC: 32 GM/DL — SIGNIFICANT CHANGE UP (ref 32–36)
MCV RBC AUTO: 92.4 FL — SIGNIFICANT CHANGE UP (ref 80–100)
PLATELET # BLD AUTO: 457 K/UL — HIGH (ref 150–400)
POTASSIUM SERPL-MCNC: 4.6 MMOL/L — SIGNIFICANT CHANGE UP (ref 3.5–5.3)
POTASSIUM SERPL-SCNC: 4.6 MMOL/L — SIGNIFICANT CHANGE UP (ref 3.5–5.3)
RBC # BLD: 2.77 M/UL — LOW (ref 4.2–5.8)
RBC # FLD: 19.8 % — HIGH (ref 10.3–14.5)
SODIUM SERPL-SCNC: 138 MMOL/L — SIGNIFICANT CHANGE UP (ref 135–145)
SPECIMEN SOURCE: SIGNIFICANT CHANGE UP
TACROLIMUS SERPL-MCNC: 7.7 NG/ML — SIGNIFICANT CHANGE UP
WBC # BLD: 2.7 K/UL — LOW (ref 3.8–10.5)
WBC # FLD AUTO: 2.7 K/UL — LOW (ref 3.8–10.5)

## 2018-08-19 PROCEDURE — 71045 X-RAY EXAM CHEST 1 VIEW: CPT | Mod: 26

## 2018-08-19 PROCEDURE — 99232 SBSQ HOSP IP/OBS MODERATE 35: CPT | Mod: GC

## 2018-08-19 RX ORDER — TACROLIMUS 5 MG/1
1 CAPSULE ORAL DAILY
Qty: 0 | Refills: 0 | Status: DISCONTINUED | OUTPATIENT
Start: 2018-08-19 | End: 2018-08-19

## 2018-08-19 RX ORDER — TACROLIMUS 5 MG/1
1 CAPSULE ORAL
Qty: 0 | Refills: 0 | Status: DISCONTINUED | OUTPATIENT
Start: 2018-08-19 | End: 2018-08-29

## 2018-08-19 RX ADMIN — MAGNESIUM OXIDE 400 MG ORAL TABLET 400 MILLIGRAM(S): 241.3 TABLET ORAL at 08:05

## 2018-08-19 RX ADMIN — ATOVAQUONE 1500 MILLIGRAM(S): 750 SUSPENSION ORAL at 12:33

## 2018-08-19 RX ADMIN — Medication 125 MILLIGRAM(S): at 12:33

## 2018-08-19 RX ADMIN — Medication 250 MILLIGRAM(S): at 06:41

## 2018-08-19 RX ADMIN — VORICONAZOLE 200 MILLIGRAM(S): 10 INJECTION, POWDER, LYOPHILIZED, FOR SOLUTION INTRAVENOUS at 06:42

## 2018-08-19 RX ADMIN — Medication 125 MILLIGRAM(S): at 06:40

## 2018-08-19 RX ADMIN — Medication 20 MILLIGRAM(S): at 22:28

## 2018-08-19 RX ADMIN — CEFEPIME 100 MILLIGRAM(S): 1 INJECTION, POWDER, FOR SOLUTION INTRAMUSCULAR; INTRAVENOUS at 06:36

## 2018-08-19 RX ADMIN — MAGNESIUM OXIDE 400 MG ORAL TABLET 400 MILLIGRAM(S): 241.3 TABLET ORAL at 17:51

## 2018-08-19 RX ADMIN — TACROLIMUS 1 MILLIGRAM(S): 5 CAPSULE ORAL at 20:04

## 2018-08-19 RX ADMIN — Medication 650 MILLIGRAM(S): at 17:51

## 2018-08-19 RX ADMIN — Medication 100 MILLIGRAM(S): at 06:37

## 2018-08-19 RX ADMIN — Medication 125 MILLIGRAM(S): at 17:51

## 2018-08-19 RX ADMIN — CEFEPIME 100 MILLIGRAM(S): 1 INJECTION, POWDER, FOR SOLUTION INTRAMUSCULAR; INTRAVENOUS at 13:54

## 2018-08-19 RX ADMIN — TACROLIMUS 0.5 MILLIGRAM(S): 5 CAPSULE ORAL at 08:04

## 2018-08-19 RX ADMIN — CEFEPIME 100 MILLIGRAM(S): 1 INJECTION, POWDER, FOR SOLUTION INTRAMUSCULAR; INTRAVENOUS at 22:28

## 2018-08-19 RX ADMIN — Medication 650 MILLIGRAM(S): at 06:39

## 2018-08-19 RX ADMIN — Medication 100 MILLIGRAM(S): at 13:55

## 2018-08-19 RX ADMIN — Medication 1 TABLET(S): at 12:33

## 2018-08-19 RX ADMIN — Medication 100 MILLIGRAM(S): at 22:28

## 2018-08-19 RX ADMIN — FAMOTIDINE 20 MILLIGRAM(S): 10 INJECTION INTRAVENOUS at 12:33

## 2018-08-19 RX ADMIN — VORICONAZOLE 200 MILLIGRAM(S): 10 INJECTION, POWDER, LYOPHILIZED, FOR SOLUTION INTRAVENOUS at 17:51

## 2018-08-19 RX ADMIN — Medication 250 MILLIGRAM(S): at 17:52

## 2018-08-19 RX ADMIN — Medication 3 MILLIGRAM(S): at 08:05

## 2018-08-19 RX ADMIN — Medication 81 MILLIGRAM(S): at 12:32

## 2018-08-19 RX ADMIN — VALGANCICLOVIR 450 MILLIGRAM(S): 450 TABLET, FILM COATED ORAL at 08:04

## 2018-08-19 NOTE — PROGRESS NOTE ADULT - PROBLEM SELECTOR PLAN 5
bicarb stable at 18, will cont supplementation and monitor bicarb stable at 20, will cont supplementation and monitor

## 2018-08-19 NOTE — PROGRESS NOTE ADULT - PROBLEM SELECTOR PLAN 2
TTE reviewed and EF at baseline. RV func mildly improved.   tacro still therapeutic as of 10.6 yesterday, goal 8-10. Level pending today.   will adjust accordingly  Given infection will decrease prednisone to 3mg qd   pt off cellcept due to hx of fungal infection - will cont hold   resumed valcyte at qod based on (GFR 44) and leukopenia   CAV: asa/pravastatin TTE reviewed and EF at baseline. RV func mildly improved.   tacro slightly subtherapeutic at 7.7 today, goal 8-10.   will adjust accordingly  Given infection will decrease prednisone to 3mg qd   pt off cellcept due to hx of fungal infection - will cont hold   resumed valcyte at qod based on (GFR 44) and leukopenia   CAV: asa/pravastatin

## 2018-08-19 NOTE — PROGRESS NOTE ADULT - PROBLEM SELECTOR PLAN 4
Stable. Likely secondary to low renal perfusion in a septic state as well as 2/2 tacrolimus   cont monitor   encouraged PO intake Improved. Likely secondary to low renal perfusion in a septic state as well as 2/2 tacrolimus   cont monitor   encouraged PO intake

## 2018-08-19 NOTE — PROGRESS NOTE ADULT - SUBJECTIVE AND OBJECTIVE BOX
24H hour events:     MEDICATIONS:  aspirin enteric coated 81 milliGRAM(s) Oral daily    atovaquone Suspension 1500 milliGRAM(s) Oral daily  cefepime   IVPB      cefepime   IVPB 1000 milliGRAM(s) IV Intermittent every 8 hours  metroNIDAZOLE  IVPB      metroNIDAZOLE  IVPB 500 milliGRAM(s) IV Intermittent every 8 hours  valGANciclovir 450 milliGRAM(s) Oral <User Schedule>  vancomycin    Solution 125 milliGRAM(s) Oral every 6 hours  vancomycin  IVPB 750 milliGRAM(s) IV Intermittent every 12 hours  voriconazole 200 milliGRAM(s) Oral every 12 hours      acetaminophen    Suspension 500 milliGRAM(s) Oral every 6 hours PRN    docusate sodium 100 milliGRAM(s) Oral two times a day PRN  famotidine    Tablet 20 milliGRAM(s) Oral daily    insulin lispro (HumaLOG) corrective regimen sliding scale   SubCutaneous three times a day before meals  insulin lispro (HumaLOG) corrective regimen sliding scale   SubCutaneous at bedtime  pravastatin 20 milliGRAM(s) Oral at bedtime  predniSONE   Tablet 3 milliGRAM(s) Oral <User Schedule>    magnesium oxide 400 milliGRAM(s) Oral two times a day with meals  multivitamin 1 Tablet(s) Oral daily  sodium bicarbonate 650 milliGRAM(s) Oral two times a day  tacrolimus 0.5 milliGRAM(s) Oral <User Schedule>  tacrolimus 0.5 milliGRAM(s) Oral <User Schedule>      REVIEW OF SYSTEMS:  Complete 10 point ROS negative except as documented above.    PHYSICAL EXAM:  T(C): 36.8 (08-19-18 @ 05:55), Max: 36.8 (08-19-18 @ 05:55)  HR: 100 (08-19-18 @ 05:55) (100 - 103)  BP: 119/80 (08-19-18 @ 05:55) (103/73 - 119/80)  RR: 18 (08-19-18 @ 05:55) (18 - 18)  SpO2: 95% (08-19-18 @ 05:55) (95% - 96%)  Wt(kg): --  I&O's Summary    17 Aug 2018 07:01  -  18 Aug 2018 07:00  --------------------------------------------------------  IN: 990 mL / OUT: 850 mL / NET: 140 mL    18 Aug 2018 07:01  -  19 Aug 2018 06:51  --------------------------------------------------------  IN: 1480 mL / OUT: 1100 mL / NET: 380 mL        Appearance: Normal	  HEENT:   Normal oral mucosa  Cardiovascular: normal rate, regular rhythm, audible S1 and S2, no murmurs, rubs, or gallops, no JVD, no edema  Respiratory: Lungs clear to auscultation	  Psychiatry: Mood & affect appropriate  Gastrointestinal:  Soft  Skin: No rashes, No ecchymoses, No cyanosis	  Neurologic: Non-focal  Extremities: No clubbing, cyanosis or edema  Vascular: Peripheral pulses palpable         LABS:	 	    CBC Full  -  ( 18 Aug 2018 09:43 )  WBC Count : 3.0 K/uL  Hemoglobin : 7.8 g/dL  Hematocrit : 24.4 %  Platelet Count - Automated : 395 K/uL  Mean Cell Volume : 91.9 fl  Mean Cell Hemoglobin : 29.5 pg  Mean Cell Hemoglobin Concentration : 32.1 gm/dL  Auto Neutrophil # : x  Auto Lymphocyte # : x  Auto Monocyte # : x  Auto Eosinophil # : x  Auto Basophil # : x  Auto Neutrophil % : x  Auto Lymphocyte % : x  Auto Monocyte % : x  Auto Eosinophil % : x  Auto Basophil % : x    08-18    132<L>  |  101  |  44<H>  ----------------------------<  138<H>  4.5   |  19<L>  |  1.25    Ca    9.1      18 Aug 2018 09:45  Mg     2.3     08-18        TELEMETRY: 	      	  ASSESSMENT/PLAN: 	        Sanford Villagran  Cardiology Fellow  Consult Fellow carries in-house phone [phone]) from 7:30 am - 5:00 pm M - F  For non-urgent issues outside of the above time period, please feel free to contact me directly by text or call at [phone] 24H hour events: No acute events overnight.     MEDICATIONS:  aspirin enteric coated 81 milliGRAM(s) Oral daily    atovaquone Suspension 1500 milliGRAM(s) Oral daily  cefepime   IVPB      cefepime   IVPB 1000 milliGRAM(s) IV Intermittent every 8 hours  metroNIDAZOLE  IVPB      metroNIDAZOLE  IVPB 500 milliGRAM(s) IV Intermittent every 8 hours  valGANciclovir 450 milliGRAM(s) Oral <User Schedule>  vancomycin    Solution 125 milliGRAM(s) Oral every 6 hours  vancomycin  IVPB 750 milliGRAM(s) IV Intermittent every 12 hours  voriconazole 200 milliGRAM(s) Oral every 12 hours      acetaminophen    Suspension 500 milliGRAM(s) Oral every 6 hours PRN    docusate sodium 100 milliGRAM(s) Oral two times a day PRN  famotidine    Tablet 20 milliGRAM(s) Oral daily    insulin lispro (HumaLOG) corrective regimen sliding scale   SubCutaneous three times a day before meals  insulin lispro (HumaLOG) corrective regimen sliding scale   SubCutaneous at bedtime  pravastatin 20 milliGRAM(s) Oral at bedtime  predniSONE   Tablet 3 milliGRAM(s) Oral <User Schedule>    magnesium oxide 400 milliGRAM(s) Oral two times a day with meals  multivitamin 1 Tablet(s) Oral daily  sodium bicarbonate 650 milliGRAM(s) Oral two times a day  tacrolimus 0.5 milliGRAM(s) Oral <User Schedule>  tacrolimus 0.5 milliGRAM(s) Oral <User Schedule>      REVIEW OF SYSTEMS:  Complete 10 point ROS negative except as documented above.    PHYSICAL EXAM:  T(C): 36.8 (08-19-18 @ 05:55), Max: 36.8 (08-19-18 @ 05:55)  HR: 100 (08-19-18 @ 05:55) (100 - 103)  BP: 119/80 (08-19-18 @ 05:55) (103/73 - 119/80)  RR: 18 (08-19-18 @ 05:55) (18 - 18)  SpO2: 95% (08-19-18 @ 05:55) (95% - 96%)  Wt(kg): --  I&O's Summary    17 Aug 2018 07:01  -  18 Aug 2018 07:00  --------------------------------------------------------  IN: 990 mL / OUT: 850 mL / NET: 140 mL    18 Aug 2018 07:01  -  19 Aug 2018 06:51  --------------------------------------------------------  IN: 1480 mL / OUT: 1100 mL / NET: 380 mL        Appearance: Normal	  HEENT:   Normal oral mucosa  Cardiovascular: normal rate, regular rhythm, audible S1 and S2, no murmurs, rubs, or gallops, no JVD, no edema  Respiratory: Lungs clear to auscultation	  Psychiatry: Mood & affect appropriate  Gastrointestinal:  Soft  Skin: No rashes, No ecchymoses, No cyanosis	  Neurologic: Non-focal  Extremities: No clubbing, cyanosis or edema  Vascular: Peripheral pulses palpable         LABS:	 	    CBC Full  -  ( 18 Aug 2018 09:43 )  WBC Count : 3.0 K/uL  Hemoglobin : 7.8 g/dL  Hematocrit : 24.4 %  Platelet Count - Automated : 395 K/uL  Mean Cell Volume : 91.9 fl  Mean Cell Hemoglobin : 29.5 pg  Mean Cell Hemoglobin Concentration : 32.1 gm/dL  Auto Neutrophil # : x  Auto Lymphocyte # : x  Auto Monocyte # : x  Auto Eosinophil # : x  Auto Basophil # : x  Auto Neutrophil % : x  Auto Lymphocyte % : x  Auto Monocyte % : x  Auto Eosinophil % : x  Auto Basophil % : x    08-18    132<L>  |  101  |  44<H>  ----------------------------<  138<H>  4.5   |  19<L>  |  1.25    Ca    9.1      18 Aug 2018 09:45  Mg     2.3     08-18        TELEMETRY: Sinus Rhythm in 90s - 110s

## 2018-08-19 NOTE — PROGRESS NOTE ADULT - SUBJECTIVE AND OBJECTIVE BOX
VITAL SIGNS    Telemetry:  sr     Vital Signs Last 24 Hrs  T(C): 36.6 (18 @ 12:41), Max: 36.8 (18 @ 05:55)  T(F): 97.8 (18 @ 12:41), Max: 98.2 (18 @ 05:55)  HR: 102 (18 @ 12:41) (100 - 102)  BP: 101/71 (18 @ 12:41) (101/71 - 119/80)  RR: 18 (18 @ 12:41) (18 - 18)  SpO2: 95% (18 @ 12:41) (95% - 96%)                    @ 07:01  -   @ 07:00  --------------------------------------------------------  IN: 1730 mL / OUT: 1100 mL / NET: 630 mL     @ 07:01  -   @ 14:52  --------------------------------------------------------  IN: 400 mL / OUT: 0 mL / NET: 400 mL          Daily     Daily Weight in k.9 (19 Aug 2018 08:06)        CAPILLARY BLOOD GLUCOSE      POCT Blood Glucose.: 146 mg/dL (19 Aug 2018 11:25)  POCT Blood Glucose.: 117 mg/dL (19 Aug 2018 07:47)  POCT Blood Glucose.: 84 mg/dL (18 Aug 2018 21:41)  POCT Blood Glucose.: 123 mg/dL (18 Aug 2018 16:32)                              PHYSICAL EXAM    Neurology: alert and oriented x 3, nonfocal, no gross deficits  CV : RRR no audible mrumurs  Sternal Wound :  CDI , Stable  Lungs:  CTA B/L  Abdomen: soft, nontender, nondistended, positive bowel sounds, last bowel movement   :      +Urination        Extremities: FROM X 4 NO C/E/E              acetaminophen    Suspension 500 milliGRAM(s) Oral every 6 hours PRN  aspirin enteric coated 81 milliGRAM(s) Oral daily  atovaquone Suspension 1500 milliGRAM(s) Oral daily  Calcium Citrate + Vit D 315mg/250 IU 2 Tablet(s) 2 Tablet(s) Oral <User Schedule>  cefepime   IVPB      cefepime   IVPB 1000 milliGRAM(s) IV Intermittent every 8 hours  docusate sodium 100 milliGRAM(s) Oral two times a day PRN  famotidine    Tablet 20 milliGRAM(s) Oral daily  insulin lispro (HumaLOG) corrective regimen sliding scale   SubCutaneous three times a day before meals  insulin lispro (HumaLOG) corrective regimen sliding scale   SubCutaneous at bedtime  magnesium oxide 400 milliGRAM(s) Oral two times a day with meals  metroNIDAZOLE  IVPB      metroNIDAZOLE  IVPB 500 milliGRAM(s) IV Intermittent every 8 hours  multivitamin 1 Tablet(s) Oral daily  pravastatin 20 milliGRAM(s) Oral at bedtime  predniSONE   Tablet 3 milliGRAM(s) Oral <User Schedule>  sodium bicarbonate 650 milliGRAM(s) Oral two times a day  tacrolimus 0.5 milliGRAM(s) Oral <User Schedule>  tacrolimus 0.5 milliGRAM(s) Oral <User Schedule>  valGANciclovir 450 milliGRAM(s) Oral <User Schedule>  vancomycin    Solution 125 milliGRAM(s) Oral every 6 hours  vancomycin  IVPB 750 milliGRAM(s) IV Intermittent every 12 hours  voriconazole 200 milliGRAM(s) Oral every 12 hours                    Physical Therapy Rec:   Home  [  ]   Home w/ PT  [  ]  Rehab  [  ]  Discussed with Cardiothoracic Team at AM rounds.

## 2018-08-19 NOTE — PROGRESS NOTE ADULT - PROBLEM SELECTOR PLAN 2
Tacro level 8.6 today, check daily tacrolimus level   Continue tacro 0.5 BID  Decreased prednisone 3 mg qd  Increase activity as tolerated   valcyte changed to every other day  starting on 8/19  pt off cellcept due to hx of fungal infection - will cont hold   Continue ASA 81 and  Pravastatin 20 mg for CAD  Rheum consult placed for Rt upper extremity joints aches and swelling

## 2018-08-19 NOTE — PROGRESS NOTE ADULT - PROBLEM SELECTOR PLAN 6
Pt without pain but admitted with joint swelling of R wrist (no crystals present)   and now with L 5th digit swelling and mild warmth   X-ray R hand c/w inflammatory arthritis   Rheum consult

## 2018-08-19 NOTE — PROGRESS NOTE ADULT - ASSESSMENT
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 Also complains of decreased PO intake for the last few days.  Denies nausea, vomiting or diarrhea. Of note right wrist is slightly tender, warm to touch w/ fluctuance, has had wrist swelling on previous admission with negative workup. Temp in clinic was 102 - most likely secondary to recurrent PNA based on new productive cough x 3 days and RUL infiltrate on CT scan.  8/14 Called by lab> arthrocentesis fluid sent for cell count> state not enough fluid for count  8/15 Still febrile, Tmax 101.8. Sputum Cx with mod gram neg rods. Unable to broaden cefepime- pt with hx of colonized carbapenem pseudomonas per ID. IR consulted to evaluate for transthoracic bx of R lung  8/16 CT guided biopsy of Right lung scheduled for Friday with Dr. Alfred, continued on IV antibiotics. VSS, Pt afebrile Tmax 98.4. Tacro level 11.5  8/17 awaiting Rt Lung bx, afebrile tacro level 10.6 valcyte decreased to every other day to start 8/19.   8/18 VVS; Right lung bx: Gram Stain: polymorphonuclear leukocytes seen. No organisms seen. Prednisone decreased to 3 mg PO daily as per CHF 2/2 infectious process. Continue with current medication regimen.   Disposition: Home once medically cleared.   8/19 vvs: awaiting final results of Cardiac biopsy.  Tacro level 7.7 today

## 2018-08-20 DIAGNOSIS — B96.89 OTHER SPECIFIED BACTERIAL AGENTS AS THE CAUSE OF DISEASES CLASSIFIED ELSEWHERE: ICD-10-CM

## 2018-08-20 LAB
4/8 RATIO: 1.41 RATIO — SIGNIFICANT CHANGE UP (ref 0.9–3.6)
ABS CD8: 85 /UL — LOW (ref 136–757)
ANION GAP SERPL CALC-SCNC: 12 MMOL/L — SIGNIFICANT CHANGE UP (ref 5–17)
BUN SERPL-MCNC: 31 MG/DL — HIGH (ref 7–23)
CALCIUM SERPL-MCNC: 9.3 MG/DL — SIGNIFICANT CHANGE UP (ref 8.4–10.5)
CD16+CD56+ CELLS NFR BLD: 22 % — SIGNIFICANT CHANGE UP (ref 4–25)
CD16+CD56+ CELLS NFR SPEC: 61 /UL — LOW (ref 64–494)
CD19 BLASTS SPEC-ACNC: 0 % — LOW (ref 5–22)
CD19 BLASTS SPEC-ACNC: 0 /UL — LOW (ref 68–528)
CD3 BLASTS SPEC-ACNC: 212 /UL — LOW (ref 799–2171)
CD3 BLASTS SPEC-ACNC: 78 % — SIGNIFICANT CHANGE UP (ref 59–85)
CD4 %: 46 % — SIGNIFICANT CHANGE UP (ref 36–65)
CD8 %: 32 % — SIGNIFICANT CHANGE UP (ref 11–36)
CHLORIDE SERPL-SCNC: 106 MMOL/L — SIGNIFICANT CHANGE UP (ref 96–108)
CO2 SERPL-SCNC: 19 MMOL/L — LOW (ref 22–31)
CREAT SERPL-MCNC: 1.11 MG/DL — SIGNIFICANT CHANGE UP (ref 0.5–1.3)
EBV DNA SERPL NAA+PROBE-ACNC: SIGNIFICANT CHANGE UP IU/ML
FUNGITELL: <31 PG/ML — SIGNIFICANT CHANGE UP (ref 0–59)
GLUCOSE BLDC GLUCOMTR-MCNC: 104 MG/DL — HIGH (ref 70–99)
GLUCOSE BLDC GLUCOMTR-MCNC: 111 MG/DL — HIGH (ref 70–99)
GLUCOSE BLDC GLUCOMTR-MCNC: 97 MG/DL — SIGNIFICANT CHANGE UP (ref 70–99)
GLUCOSE BLDC GLUCOMTR-MCNC: 99 MG/DL — SIGNIFICANT CHANGE UP (ref 70–99)
GLUCOSE SERPL-MCNC: 103 MG/DL — HIGH (ref 70–99)
HCT VFR BLD CALC: 26.1 % — LOW (ref 39–50)
HGB BLD-MCNC: 8.1 G/DL — LOW (ref 13–17)
MCHC RBC-ENTMCNC: 28.5 PG — SIGNIFICANT CHANGE UP (ref 27–34)
MCHC RBC-ENTMCNC: 31.1 GM/DL — LOW (ref 32–36)
MCV RBC AUTO: 91.6 FL — SIGNIFICANT CHANGE UP (ref 80–100)
PLATELET # BLD AUTO: 493 K/UL — HIGH (ref 150–400)
POTASSIUM SERPL-MCNC: 5.1 MMOL/L — SIGNIFICANT CHANGE UP (ref 3.5–5.3)
POTASSIUM SERPL-SCNC: 5.1 MMOL/L — SIGNIFICANT CHANGE UP (ref 3.5–5.3)
RBC # BLD: 2.85 M/UL — LOW (ref 4.2–5.8)
RBC # FLD: 19.5 % — HIGH (ref 10.3–14.5)
SODIUM SERPL-SCNC: 137 MMOL/L — SIGNIFICANT CHANGE UP (ref 135–145)
T-CELL CD4 SUBSET PNL BLD: 120 /UL — LOW (ref 489–1457)
TACROLIMUS SERPL-MCNC: 8 NG/ML — SIGNIFICANT CHANGE UP
VANCOMYCIN TROUGH SERPL-MCNC: 17.5 UG/ML — SIGNIFICANT CHANGE UP (ref 10–20)
WBC # BLD: 2.4 K/UL — LOW (ref 3.8–10.5)
WBC # FLD AUTO: 2.4 K/UL — LOW (ref 3.8–10.5)

## 2018-08-20 PROCEDURE — 71045 X-RAY EXAM CHEST 1 VIEW: CPT | Mod: 26

## 2018-08-20 PROCEDURE — 99232 SBSQ HOSP IP/OBS MODERATE 35: CPT

## 2018-08-20 PROCEDURE — 99233 SBSQ HOSP IP/OBS HIGH 50: CPT | Mod: GC

## 2018-08-20 RX ORDER — SODIUM CHLORIDE 0.65 %
1 AEROSOL, SPRAY (ML) NASAL THREE TIMES A DAY
Qty: 0 | Refills: 0 | Status: DISCONTINUED | OUTPATIENT
Start: 2018-08-20 | End: 2018-08-29

## 2018-08-20 RX ADMIN — Medication 650 MILLIGRAM(S): at 17:25

## 2018-08-20 RX ADMIN — CEFEPIME 100 MILLIGRAM(S): 1 INJECTION, POWDER, FOR SOLUTION INTRAMUSCULAR; INTRAVENOUS at 21:21

## 2018-08-20 RX ADMIN — CEFEPIME 100 MILLIGRAM(S): 1 INJECTION, POWDER, FOR SOLUTION INTRAMUSCULAR; INTRAVENOUS at 05:26

## 2018-08-20 RX ADMIN — Medication 100 MILLIGRAM(S): at 06:27

## 2018-08-20 RX ADMIN — MAGNESIUM OXIDE 400 MG ORAL TABLET 400 MILLIGRAM(S): 241.3 TABLET ORAL at 07:53

## 2018-08-20 RX ADMIN — Medication 125 MILLIGRAM(S): at 06:35

## 2018-08-20 RX ADMIN — Medication 250 MILLIGRAM(S): at 17:28

## 2018-08-20 RX ADMIN — ATOVAQUONE 1500 MILLIGRAM(S): 750 SUSPENSION ORAL at 11:53

## 2018-08-20 RX ADMIN — Medication 650 MILLIGRAM(S): at 06:35

## 2018-08-20 RX ADMIN — VORICONAZOLE 200 MILLIGRAM(S): 10 INJECTION, POWDER, LYOPHILIZED, FOR SOLUTION INTRAVENOUS at 06:34

## 2018-08-20 RX ADMIN — Medication 125 MILLIGRAM(S): at 11:54

## 2018-08-20 RX ADMIN — TACROLIMUS 0.5 MILLIGRAM(S): 5 CAPSULE ORAL at 07:53

## 2018-08-20 RX ADMIN — Medication 125 MILLIGRAM(S): at 00:07

## 2018-08-20 RX ADMIN — Medication 1 TABLET(S): at 11:53

## 2018-08-20 RX ADMIN — Medication 125 MILLIGRAM(S): at 17:25

## 2018-08-20 RX ADMIN — Medication 100 MILLIGRAM(S): at 21:21

## 2018-08-20 RX ADMIN — Medication 100 MILLIGRAM(S): at 13:50

## 2018-08-20 RX ADMIN — VORICONAZOLE 200 MILLIGRAM(S): 10 INJECTION, POWDER, LYOPHILIZED, FOR SOLUTION INTRAVENOUS at 17:26

## 2018-08-20 RX ADMIN — Medication 20 MILLIGRAM(S): at 21:21

## 2018-08-20 RX ADMIN — TACROLIMUS 1 MILLIGRAM(S): 5 CAPSULE ORAL at 20:10

## 2018-08-20 RX ADMIN — Medication 125 MILLIGRAM(S): at 23:51

## 2018-08-20 RX ADMIN — FAMOTIDINE 20 MILLIGRAM(S): 10 INJECTION INTRAVENOUS at 11:53

## 2018-08-20 RX ADMIN — Medication 81 MILLIGRAM(S): at 11:52

## 2018-08-20 RX ADMIN — Medication 250 MILLIGRAM(S): at 06:37

## 2018-08-20 RX ADMIN — MAGNESIUM OXIDE 400 MG ORAL TABLET 400 MILLIGRAM(S): 241.3 TABLET ORAL at 16:45

## 2018-08-20 RX ADMIN — CEFEPIME 100 MILLIGRAM(S): 1 INJECTION, POWDER, FOR SOLUTION INTRAMUSCULAR; INTRAVENOUS at 13:49

## 2018-08-20 RX ADMIN — Medication 3 MILLIGRAM(S): at 07:53

## 2018-08-20 NOTE — PROVIDER CONTACT NOTE (OTHER) - ASSESSMENT
VSS, denies lightheadedness, dizziness, chest pain, skin warm and dry.  Denies headache
Patient stable, tolerating PO intake, patient blood cultured, cefepime antibiotic given, vancomycin currently infusing.

## 2018-08-20 NOTE — PROGRESS NOTE ADULT - PROBLEM SELECTOR PLAN 6
Pt without pain but admitted with joint swelling of R wrist (no crystals present)   and now with L 5th digit swelling and mild warmth which has now improved   X-ray R hand c/w inflammatory arthritis Pt without pain but admitted with joint swelling of R wrist (no crystals present)   and with transient L 5th digit swelling and mild warmth which has now improved   X-ray R hand c/w inflammatory arthritis

## 2018-08-20 NOTE — PROGRESS NOTE ADULT - PROBLEM SELECTOR PLAN 4
Improved. Likely secondary to low renal perfusion in a septic state as well as 2/2 tacrolimus   cont monitor   encouraged PO intake

## 2018-08-20 NOTE — PROGRESS NOTE ADULT - ASSESSMENT
67 y/o male with PMH NICM HFrEF s/p HM2 LVAD 6/2017, who underwent heart transplant on 2/23/18 (CMV D-/R+ and Toxo D-/R+, Hep C+ heart) with post op graft dysfunction who underwent plasmapheresis and IVIG x2 as well as rituximab, with suspected fungal PNA diagnosed 6/2018 on voriconazole, who presented to clinic today after routine blood work, stating he is not feeling well this morning, feels weak, no chills, no reported fever, denies chest pain or sob. Also complains of decreased PO intake for the last few days.  Denies nausea, vomiting or diarrhea. Of note right wrist is slightly tender, warm to touch w/ fluctuance, has had wrist swelling on previous admission with negative workup. Temp in clinic was 102 - most likely secondary to recurrent PNA based on new productive cough x 3 days and RUL infiltrate on CT scan.  8/14 Called by lab> arthrocentesis fluid sent for cell count> state not enough fluid for count  8/15 Still febrile, Tmax 101.8. Sputum Cx with mod gram neg rods. Unable to broaden cefepime- pt with hx of colonized carbapenem pseudomonas per ID. IR consulted to evaluate for transthoracic bx of R lung  8/16 CT guided biopsy of Right lung scheduled for Friday with Dr. Alfred, continued on IV antibiotics. VSS, Pt afebrile Tmax 98.4. Tacro level 11.5  8/17 awaiting Rt Lung bx, afebrile tacro level 10.6 valcyte decreased to every other day to start 8/19.   8/18 VVS; Right lung bx: Gram Stain: polymorphonuclear leukocytes seen. No organisms seen. Prednisone decreased to 3 mg PO daily as per CHF 2/2 infectious process. Continue with current medication regimen.   Disposition: Home once medically cleared.   8/19 vvs: awaiting final results of Cardiac biopsy.  Tacro level 7.7 today   8/20 Pt is feeling better today, seen OOB in chair. Cardiac biopsy results still pending.  tacro level 8.0 today.  pt is afebrile but remains leukopenic.  WBC 2.7

## 2018-08-20 NOTE — PROGRESS NOTE ADULT - SUBJECTIVE AND OBJECTIVE BOX
INFECTIOUS DISEASES FOLLOW UP-- Sujey Lujan  [phone]    This is a follow up note for this  68yMale with  Infection due to fungus      ROS:  CONSTITUTIONAL:  No fever, good appetite  CARDIOVASCULAR:  No chest pain or palpitations  RESPIRATORY:  No dyspnea  GASTROINTESTINAL:  No nausea, vomiting, diarrhea imroved, or abdominal pain  GENITOURINARY:  No dysuria  NEUROLOGIC:  No headache,     Allergies    No Known Allergies    Intolerances        ANTIBIOTICS/RELEVANT:  antimicrobials  atovaquone Suspension 1500 milliGRAM(s) Oral daily  cefepime   IVPB      cefepime   IVPB 1000 milliGRAM(s) IV Intermittent every 8 hours  metroNIDAZOLE  IVPB      metroNIDAZOLE  IVPB 500 milliGRAM(s) IV Intermittent every 8 hours  vancomycin    Solution 125 milliGRAM(s) Oral every 6 hours  vancomycin  IVPB 750 milliGRAM(s) IV Intermittent every 12 hours  voriconazole 200 milliGRAM(s) Oral every 12 hours    immunologic:  tacrolimus 0.5 milliGRAM(s) Oral <User Schedule>  tacrolimus 1 milliGRAM(s) Oral <User Schedule>    OTHER:  acetaminophen    Suspension 500 milliGRAM(s) Oral every 6 hours PRN  aspirin enteric coated 81 milliGRAM(s) Oral daily  Calcium Citrate + Vit D 315mg/250 IU 2 Tablet(s) 2 Tablet(s) Oral <User Schedule>  docusate sodium 100 milliGRAM(s) Oral two times a day PRN  famotidine    Tablet 20 milliGRAM(s) Oral daily  insulin lispro (HumaLOG) corrective regimen sliding scale   SubCutaneous three times a day before meals  insulin lispro (HumaLOG) corrective regimen sliding scale   SubCutaneous at bedtime  magnesium oxide 400 milliGRAM(s) Oral two times a day with meals  multivitamin 1 Tablet(s) Oral daily  pravastatin 20 milliGRAM(s) Oral at bedtime  predniSONE   Tablet 3 milliGRAM(s) Oral <User Schedule>  sodium bicarbonate 650 milliGRAM(s) Oral two times a day  sodium chloride 0.65% Nasal 1 Spray(s) Both Nostrils three times a day      Objective:  Vital Signs Last 24 Hrs  T(C): 36.6 (20 Aug 2018 17:18), Max: 36.9 (20 Aug 2018 05:16)  T(F): 97.9 (20 Aug 2018 17:18), Max: 98.5 (20 Aug 2018 05:16)  HR: 106 (20 Aug 2018 17:18) (101 - 107)  BP: 132/92 (20 Aug 2018 17:18) (114/79 - 132/92)  BP(mean): --  RR: 18 (20 Aug 2018 17:18) (18 - 18)  SpO2: 97% (20 Aug 2018 14:27) (95% - 97%)    PHYSICAL EXAM:  Constitutional:no acute distress  Eyes:WALT, EOMI  Ear/Nose/Throat: no oral lesions, 	  Respiratory: clear BL  Cardiovascular: S1S2  Gastrointestinal:soft, (+) BS, no tenderness  Extremities:no e/e/c right qwrist slightly swollen compared to left  No Lymphadenopathy  IV sites not inflammed.    LABS:                        8.1    2.4   )-----------( 493      ( 20 Aug 2018 07:40 )             26.1     08-20    137  |  106  |  31<H>  ----------------------------<  103<H>  5.1   |  19<L>  |  1.11    Ca    9.3      20 Aug 2018 07:41            MICROBIOLOGY:            RECENT CULTURES:  08-17 @ 21:35  .Body Fluid Lung - Upper Lobe Right  --  --  --    Few Corynebacterium species  "Susceptibilities not performed"  --  08-15 @ 02:27  .Urine Clean Catch (Midstream)  --  --  --    No growth  --  08-14 @ 21:27  .Blood Blood-Peripheral  --  --  --    No growth at 5 days.  --  08-14 @ 21:11  .Abscess Arm - Right  --  --  --    No growth at 5 days  --  08-14 @ 16:59  .Sputum Sputum  --  --  --    Normal Respiratory Heaven present  --      RADIOLOGY & ADDITIONAL STUDIES:    < from: Xray Chest 1 View- PORTABLE-Routine (08.20.18 @ 05:55) >    IMPRESSION: Unchanged opacities in the right lower lung field. No   pneumothorax.    < end of copied text >

## 2018-08-20 NOTE — PROVIDER CONTACT NOTE (OTHER) - ACTION/TREATMENT ORDERED:
Noor NP on unit and made aware. Continue to monitor patient for signs/symptoms, reoccurence of nose bleed
Patient safety maintain, will continue to monitor patient.

## 2018-08-20 NOTE — PROGRESS NOTE ADULT - SUBJECTIVE AND OBJECTIVE BOX
VITAL SIGNS    Telemetry:      Vital Signs Last 24 Hrs  T(C): 36.7 (18 @ 14:27), Max: 36.9 (18 @ 05:16)  T(F): 98 (18 @ 14:27), Max: 98.5 (18 @ 05:16)  HR: 104 (18 @ 14:27) (101 - 107)  BP: 115/83 (18 @ 14:27) (114/79 - 115/83)  RR: 18 (18 @ 14:27) (18 - 18)  SpO2: 97% (18 @ 14:27) (95% - 97%)                    @ 07:01  -   @ 07:00  --------------------------------------------------------  IN: 1260 mL / OUT: 200 mL / NET: 1060 mL     @ 07:01  -   @ 15:10  --------------------------------------------------------  IN: 630 mL / OUT: 425 mL / NET: 205 mL          Daily     Daily Weight in k.1 (20 Aug 2018 07:51)        CAPILLARY BLOOD GLUCOSE      POCT Blood Glucose.: 111 mg/dL (20 Aug 2018 11:50)  POCT Blood Glucose.: 104 mg/dL (20 Aug 2018 07:32)  POCT Blood Glucose.: 98 mg/dL (19 Aug 2018 21:40)  POCT Blood Glucose.: 103 mg/dL (19 Aug 2018 16:39)                             PHYSICAL EXAM    Neurology: alert and oriented x 3, nonfocal, no gross deficits  CV : RRR no audible murmurs  Sternal Wound :  CDI , Stable  Lungs: CTA B/L   Abdomen: soft, nontender, nondistended, positive bowel sounds, last bowel movement   :    + urination          Extremities:     FROM X 4  no c/e/e        acetaminophen    Suspension 500 milliGRAM(s) Oral every 6 hours PRN  aspirin enteric coated 81 milliGRAM(s) Oral daily  atovaquone Suspension 1500 milliGRAM(s) Oral daily  Calcium Citrate + Vit D 315mg/250 IU 2 Tablet(s) 2 Tablet(s) Oral <User Schedule>  cefepime   IVPB      cefepime   IVPB 1000 milliGRAM(s) IV Intermittent every 8 hours  docusate sodium 100 milliGRAM(s) Oral two times a day PRN  famotidine    Tablet 20 milliGRAM(s) Oral daily  insulin lispro (HumaLOG) corrective regimen sliding scale   SubCutaneous three times a day before meals  insulin lispro (HumaLOG) corrective regimen sliding scale   SubCutaneous at bedtime  magnesium oxide 400 milliGRAM(s) Oral two times a day with meals  metroNIDAZOLE  IVPB      metroNIDAZOLE  IVPB 500 milliGRAM(s) IV Intermittent every 8 hours  multivitamin 1 Tablet(s) Oral daily  pravastatin 20 milliGRAM(s) Oral at bedtime  predniSONE   Tablet 3 milliGRAM(s) Oral <User Schedule>  sodium bicarbonate 650 milliGRAM(s) Oral two times a day  tacrolimus 0.5 milliGRAM(s) Oral <User Schedule>  tacrolimus 1 milliGRAM(s) Oral <User Schedule>  vancomycin    Solution 125 milliGRAM(s) Oral every 6 hours  vancomycin  IVPB 750 milliGRAM(s) IV Intermittent every 12 hours  voriconazole 200 milliGRAM(s) Oral every 12 hours                    Physical Therapy Rec:   Home  [ X ]   Home w/ PT  [  ]  Rehab  [  ]  Discussed with Cardiothoracic Team at AM rounds.

## 2018-08-20 NOTE — PROGRESS NOTE ADULT - ASSESSMENT
Mood "good", denied anxiety or depressed mood.  Appetite improving.  Sleeping OK.  Denied pain. Mood "good", denied anxiety or depressed mood.  Appetite improving.  Sleeping OK.  Denied pain.  Current stressors include current hospital readmission, which was unexpected by patient.  Social support system includes godbrother (who has visited and lives with patient), granddaughters, and friend.  Coping strategies include "taking medications and exercising".  Current physical activity includes walking back and forth down the hallway, with the use of a walker, without issues (denied SOB, dizziness, and heart palpitations).  Reported feeling much better overall and looking forward to hospital discharge this week.    Dx: R/O Adjustment disorder    Recommendation: Behavioral Cardiology will continue to follow as needed.    Rosio Dacosta M.A.  Psychology Extern

## 2018-08-20 NOTE — PROGRESS NOTE ADULT - PROBLEM SELECTOR PLAN 1
Pt with h/o colonized carbapenem resistant pseudomonas   will cont vanc, voriconazole, and cefepime per ID   RVP neg, U legionella neg, MRSA PCR neg,  Sputum with GNR - normal respiratory jose   Pt defervesced  BCx - NGTD, UA neg, CMV PCR neg, U legionella neg   quant gold indeterminate - in immunosuppressed pt   UCx, fungitel, aspergillosis galactomannon - all negative   s/p lung tissue biopsy with IR on 8/18/2018 - Corynebacterium   arthrocentesis of R wrist - culture: no growth  Will disuss ABx regimen with ID - currently on vanc and cefepime and voriconazole   Started on oral vanco and IV flagyl for c. diff Pt with h/o colonized carbapenem resistant pseudomonas   will cont vanc, voriconazole, and cefepime per ID   RVP neg, U legionella neg, MRSA PCR neg,  Sputum with GNR - normal respiratory jose   Pt defervesced  BCx - NGTD, UA neg, CMV PCR neg, U legionella neg   quant gold indeterminate - in immunosuppressed pt   UCx, fungitel, aspergillosis galactomannon - all negative   s/p lung tissue biopsy with IR on 8/18/2018 - Corynebacterium   arthrocentesis of R wrist - culture: no growth  Will discuss ABx regimen with ID - currently on vanc and cefepime and voriconazole   Started on oral vanco and IV flagyl for c. diff

## 2018-08-20 NOTE — PROGRESS NOTE ADULT - PROBLEM SELECTOR PLAN 2
TTE reviewed and EF at baseline. RV func mildly improved.   tacro slightly therapeutic at 8 today, goal 8-10.   will adjust accordingly  cont prednisone  3mg qd   pt off cellcept due to hx of fungal infection - will cont hold   holding valcyte    CAV: asa/pravastatin TTE reviewed and EF at baseline. RV func mildly improved.   tacro therapeutic at 8 today, goal 8-10.   will cont 0.5/1  cont prednisone  3mg qd   pt off cellcept due to hx of fungal infection - will cont hold   holding valcyte given leukopenia   CAV: asa/pravastatin

## 2018-08-20 NOTE — PROGRESS NOTE ADULT - ASSESSMENT
69 yo M s/p heart transplant complicated by rejection with rounds of plasmaphoresis , IVIG, rituximab x2, known colinizer with  Pseudomonas last treated for PNA in June 2018 with cefepime and voriconazole now presents with fever, decreased PO intake, productive cough. Ddx include recurrent pneumonia vs gout flare  vs other infectious process.     Suggest:  -CT guided biopsy cultures are NGTD- only grew a corynebacterium that is likely  not a pathogen  pathology of the CT biopsy also without definitive diagnosis  Will discontinue the Vancomycin  Continue the Cefepime/Vori    Will send fungitell and galactomannan and QuantiFeron again    C.diff- diarrhea improved with oral Vanco/IV flagyl    OI prophylaxis with Mepron/Valganciclovir    Will discuss next steps with Cardiology team    Gary Lujan MD  [phone]  After 5pm/weekends [phone]            Gary Lujan MD  [phone]  After 5pm/weekends [phone]

## 2018-08-20 NOTE — PROGRESS NOTE ADULT - SUBJECTIVE AND OBJECTIVE BOX
Behavioral Cardiology Progress Note     History of Present Illness: 	  Mr. Baez is a 68-year-old man with past medical history of a nonischemic cardiomyopathy and chronic systolic heart failure s/p HM2 LVAD in June 2017 complicated by recurrent GI hemorrhage and possible pump thrombosis who underwent heart transplant on 2/23/18.    MSE: Seen sitting in chair.  A&Ox4. Speech normal rate, rhythm, and volume.  No abnormal movements observed.  Thought process logical.  No evidence of any psychosis or jona.  Mood "good"  Affect mood-congruent.  Insight and judgment adequate. Behavioral Cardiology Progress Note     History of Present Illness: 	  Mr. Baez is a 68-year-old man with past medical history of a nonischemic cardiomyopathy and chronic systolic heart failure s/p HM2 LVAD in June 2017 complicated by recurrent GI hemorrhage and possible pump thrombosis who underwent heart transplant on 2/23/18.    MSE: Seen sitting in chair.  A&Ox4. Speech normal rate, rhythm, and volume.  Well-related with good eye contact.  No abnormal movements observed.  Thought process logical.  No evidence of any psychosis or jona.  Mood "good"  Affect mood-congruent.  Insight and judgment adequate.

## 2018-08-20 NOTE — PROGRESS NOTE ADULT - SUBJECTIVE AND OBJECTIVE BOX
Pt sitting in chair. Cough improved.     MEDICATIONS:  aspirin enteric coated 81 milliGRAM(s) Oral daily    atovaquone Suspension 1500 milliGRAM(s) Oral daily  cefepime   IVPB      cefepime   IVPB 1000 milliGRAM(s) IV Intermittent every 8 hours  metroNIDAZOLE  IVPB      metroNIDAZOLE  IVPB 500 milliGRAM(s) IV Intermittent every 8 hours  vancomycin    Solution 125 milliGRAM(s) Oral every 6 hours  vancomycin  IVPB 750 milliGRAM(s) IV Intermittent every 12 hours  voriconazole 200 milliGRAM(s) Oral every 12 hours      acetaminophen    Suspension 500 milliGRAM(s) Oral every 6 hours PRN    docusate sodium 100 milliGRAM(s) Oral two times a day PRN  famotidine    Tablet 20 milliGRAM(s) Oral daily    insulin lispro (HumaLOG) corrective regimen sliding scale   SubCutaneous three times a day before meals  insulin lispro (HumaLOG) corrective regimen sliding scale   SubCutaneous at bedtime  pravastatin 20 milliGRAM(s) Oral at bedtime  predniSONE   Tablet 3 milliGRAM(s) Oral <User Schedule>    magnesium oxide 400 milliGRAM(s) Oral two times a day with meals  multivitamin 1 Tablet(s) Oral daily  sodium bicarbonate 650 milliGRAM(s) Oral two times a day  tacrolimus 0.5 milliGRAM(s) Oral <User Schedule>  tacrolimus 1 milliGRAM(s) Oral <User Schedule>      PHYSICAL EXAM:  T(C): 36.7 (08-20-18 @ 14:27), Max: 36.9 (08-20-18 @ 05:16)  HR: 104 (08-20-18 @ 14:27) (101 - 107)  BP: 115/83 (08-20-18 @ 14:27) (114/79 - 115/83)  RR: 18 (08-20-18 @ 14:27) (18 - 18)  SpO2: 97% (08-20-18 @ 14:27) (95% - 97%)  Wt(kg): --  I&O's Summary    19 Aug 2018 07:01  -  20 Aug 2018 07:00  --------------------------------------------------------  IN: 1260 mL / OUT: 200 mL / NET: 1060 mL    20 Aug 2018 07:01  -  20 Aug 2018 15:42  --------------------------------------------------------  IN: 630 mL / OUT: 425 mL / NET: 205 mL        Appearance: Normal	  HEENT:   Normal oral mucosa, PERRL, EOMI	  Lymphatic: No lymphadenopathy  Cardiovascular: Normal S1 S2, JVP 6, No murmurs, No edema  Respiratory: Lungs clear to auscultation	  Psychiatry: A & O x 3, Mood & affect appropriate  Gastrointestinal:  Soft, Non-tender, + BS	  Skin: No rashes, No ecchymoses, No cyanosis	  Neurologic: Non-focal  Extremities: Normal range of motion, No clubbing, cyanosis or edema  Vascular: Peripheral pulses palpable  bilaterally        LABS:	 	    CBC Full  -  ( 20 Aug 2018 07:40 )  WBC Count : 2.4 K/uL  Hemoglobin : 8.1 g/dL  Hematocrit : 26.1 %  Platelet Count - Automated : 493 K/uL  Mean Cell Volume : 91.6 fl  Mean Cell Hemoglobin : 28.5 pg  Mean Cell Hemoglobin Concentration : 31.1 gm/dL  Auto Neutrophil # : x  Auto Lymphocyte # : x  Auto Monocyte # : x  Auto Eosinophil # : x  Auto Basophil # : x  Auto Neutrophil % : x  Auto Lymphocyte % : x  Auto Monocyte % : x  Auto Eosinophil % : x  Auto Basophil % : x    08-20    137  |  106  |  31<H>  ----------------------------<  103<H>  5.1   |  19<L>  |  1.11  08-19    138  |  107  |  35<H>  ----------------------------<  112<H>  4.6   |  20<L>  |  1.11    Ca    9.3      20 Aug 2018 07:41  Ca    8.9      19 Aug 2018 08:01 Pt sitting in chair. Cough improved. Normal bowel movements today. Last diarrhea yesterday.     MEDICATIONS:  aspirin enteric coated 81 milliGRAM(s) Oral daily    atovaquone Suspension 1500 milliGRAM(s) Oral daily  cefepime   IVPB      cefepime   IVPB 1000 milliGRAM(s) IV Intermittent every 8 hours  metroNIDAZOLE  IVPB      metroNIDAZOLE  IVPB 500 milliGRAM(s) IV Intermittent every 8 hours  vancomycin    Solution 125 milliGRAM(s) Oral every 6 hours  vancomycin  IVPB 750 milliGRAM(s) IV Intermittent every 12 hours  voriconazole 200 milliGRAM(s) Oral every 12 hours      acetaminophen    Suspension 500 milliGRAM(s) Oral every 6 hours PRN    docusate sodium 100 milliGRAM(s) Oral two times a day PRN  famotidine    Tablet 20 milliGRAM(s) Oral daily    insulin lispro (HumaLOG) corrective regimen sliding scale   SubCutaneous three times a day before meals  insulin lispro (HumaLOG) corrective regimen sliding scale   SubCutaneous at bedtime  pravastatin 20 milliGRAM(s) Oral at bedtime  predniSONE   Tablet 3 milliGRAM(s) Oral <User Schedule>    magnesium oxide 400 milliGRAM(s) Oral two times a day with meals  multivitamin 1 Tablet(s) Oral daily  sodium bicarbonate 650 milliGRAM(s) Oral two times a day  tacrolimus 0.5 milliGRAM(s) Oral <User Schedule>  tacrolimus 1 milliGRAM(s) Oral <User Schedule>      PHYSICAL EXAM:  T(C): 36.7 (08-20-18 @ 14:27), Max: 36.9 (08-20-18 @ 05:16)  HR: 104 (08-20-18 @ 14:27) (101 - 107)  BP: 115/83 (08-20-18 @ 14:27) (114/79 - 115/83)  RR: 18 (08-20-18 @ 14:27) (18 - 18)  SpO2: 97% (08-20-18 @ 14:27) (95% - 97%)  Wt(kg): --  I&O's Summary    19 Aug 2018 07:01  -  20 Aug 2018 07:00  --------------------------------------------------------  IN: 1260 mL / OUT: 200 mL / NET: 1060 mL    20 Aug 2018 07:01  -  20 Aug 2018 15:42  --------------------------------------------------------  IN: 630 mL / OUT: 425 mL / NET: 205 mL        Appearance: Normal	  HEENT:   Normal oral mucosa, PERRL, EOMI	  Lymphatic: No lymphadenopathy  Cardiovascular: Normal S1 S2, JVP 6, No murmurs, No edema  Respiratory: Lungs clear to auscultation	  Psychiatry: A & O x 3, Mood & affect appropriate  Gastrointestinal:  Soft, Non-tender, + BS	  Skin: No rashes, No ecchymoses  R 3rd digit with dry gangrene   Neurologic: Non-focal  Extremities: Normal range of motion, No clubbing, cyanosis or edema  Vascular: Peripheral pulses palpable  bilaterally        LABS:	 	    CBC Full  -  ( 20 Aug 2018 07:40 )  WBC Count : 2.4 K/uL  Hemoglobin : 8.1 g/dL  Hematocrit : 26.1 %  Platelet Count - Automated : 493 K/uL  Mean Cell Volume : 91.6 fl  Mean Cell Hemoglobin : 28.5 pg  Mean Cell Hemoglobin Concentration : 31.1 gm/dL  Auto Neutrophil # : x  Auto Lymphocyte # : x  Auto Monocyte # : x  Auto Eosinophil # : x  Auto Basophil # : x  Auto Neutrophil % : x  Auto Lymphocyte % : x  Auto Monocyte % : x  Auto Eosinophil % : x  Auto Basophil % : x    08-20    137  |  106  |  31<H>  ----------------------------<  103<H>  5.1   |  19<L>  |  1.11  08-19    138  |  107  |  35<H>  ----------------------------<  112<H>  4.6   |  20<L>  |  1.11    Ca    9.3      20 Aug 2018 07:41  Ca    8.9      19 Aug 2018 08:01

## 2018-08-21 LAB
ANION GAP SERPL CALC-SCNC: 11 MMOL/L — SIGNIFICANT CHANGE UP (ref 5–17)
BUN SERPL-MCNC: 23 MG/DL — SIGNIFICANT CHANGE UP (ref 7–23)
CALCIUM SERPL-MCNC: 8.8 MG/DL — SIGNIFICANT CHANGE UP (ref 8.4–10.5)
CHLORIDE SERPL-SCNC: 104 MMOL/L — SIGNIFICANT CHANGE UP (ref 96–108)
CO2 SERPL-SCNC: 19 MMOL/L — LOW (ref 22–31)
CREAT SERPL-MCNC: 0.99 MG/DL — SIGNIFICANT CHANGE UP (ref 0.5–1.3)
GAMMA INTERFERON BACKGROUND BLD IA-ACNC: 0.03 IU/ML — SIGNIFICANT CHANGE UP
GLUCOSE BLDC GLUCOMTR-MCNC: 103 MG/DL — HIGH (ref 70–99)
GLUCOSE BLDC GLUCOMTR-MCNC: 104 MG/DL — HIGH (ref 70–99)
GLUCOSE BLDC GLUCOMTR-MCNC: 133 MG/DL — HIGH (ref 70–99)
GLUCOSE BLDC GLUCOMTR-MCNC: 89 MG/DL — SIGNIFICANT CHANGE UP (ref 70–99)
GLUCOSE SERPL-MCNC: 134 MG/DL — HIGH (ref 70–99)
HCT VFR BLD CALC: 25 % — LOW (ref 39–50)
HGB BLD-MCNC: 7.9 G/DL — LOW (ref 13–17)
M TB IFN-G BLD-IMP: ABNORMAL
M TB IFN-G CD4+ BCKGRND COR BLD-ACNC: -0.01 IU/ML — SIGNIFICANT CHANGE UP
M TB IFN-G CD4+CD8+ BCKGRND COR BLD-ACNC: -0.01 IU/ML — SIGNIFICANT CHANGE UP
MCHC RBC-ENTMCNC: 29.1 PG — SIGNIFICANT CHANGE UP (ref 27–34)
MCHC RBC-ENTMCNC: 31.6 GM/DL — LOW (ref 32–36)
MCV RBC AUTO: 92 FL — SIGNIFICANT CHANGE UP (ref 80–100)
PLATELET # BLD AUTO: 530 K/UL — HIGH (ref 150–400)
POTASSIUM SERPL-MCNC: 4.5 MMOL/L — SIGNIFICANT CHANGE UP (ref 3.5–5.3)
POTASSIUM SERPL-SCNC: 4.5 MMOL/L — SIGNIFICANT CHANGE UP (ref 3.5–5.3)
QUANT TB PLUS MITOGEN MINUS NIL: 0.45 IU/ML — SIGNIFICANT CHANGE UP
RBC # BLD: 2.72 M/UL — LOW (ref 4.2–5.8)
RBC # FLD: 19.4 % — HIGH (ref 10.3–14.5)
SODIUM SERPL-SCNC: 134 MMOL/L — LOW (ref 135–145)
TACROLIMUS SERPL-MCNC: 8 NG/ML — SIGNIFICANT CHANGE UP
TM INTERPRETATION: SIGNIFICANT CHANGE UP
WBC # BLD: 2.4 K/UL — LOW (ref 3.8–10.5)
WBC # FLD AUTO: 2.4 K/UL — LOW (ref 3.8–10.5)

## 2018-08-21 PROCEDURE — 99232 SBSQ HOSP IP/OBS MODERATE 35: CPT | Mod: GC

## 2018-08-21 PROCEDURE — 71045 X-RAY EXAM CHEST 1 VIEW: CPT | Mod: 26

## 2018-08-21 PROCEDURE — 99233 SBSQ HOSP IP/OBS HIGH 50: CPT | Mod: GC

## 2018-08-21 PROCEDURE — 90791 PSYCH DIAGNOSTIC EVALUATION: CPT

## 2018-08-21 RX ADMIN — TACROLIMUS 0.5 MILLIGRAM(S): 5 CAPSULE ORAL at 07:52

## 2018-08-21 RX ADMIN — Medication 81 MILLIGRAM(S): at 12:30

## 2018-08-21 RX ADMIN — MAGNESIUM OXIDE 400 MG ORAL TABLET 400 MILLIGRAM(S): 241.3 TABLET ORAL at 07:53

## 2018-08-21 RX ADMIN — Medication 650 MILLIGRAM(S): at 05:20

## 2018-08-21 RX ADMIN — FAMOTIDINE 20 MILLIGRAM(S): 10 INJECTION INTRAVENOUS at 12:30

## 2018-08-21 RX ADMIN — Medication 250 MILLIGRAM(S): at 05:21

## 2018-08-21 RX ADMIN — ATOVAQUONE 1500 MILLIGRAM(S): 750 SUSPENSION ORAL at 12:31

## 2018-08-21 RX ADMIN — Medication 20 MILLIGRAM(S): at 22:32

## 2018-08-21 RX ADMIN — Medication 125 MILLIGRAM(S): at 05:20

## 2018-08-21 RX ADMIN — CEFEPIME 100 MILLIGRAM(S): 1 INJECTION, POWDER, FOR SOLUTION INTRAMUSCULAR; INTRAVENOUS at 22:32

## 2018-08-21 RX ADMIN — Medication 125 MILLIGRAM(S): at 23:51

## 2018-08-21 RX ADMIN — Medication 125 MILLIGRAM(S): at 12:30

## 2018-08-21 RX ADMIN — Medication 650 MILLIGRAM(S): at 17:50

## 2018-08-21 RX ADMIN — VORICONAZOLE 200 MILLIGRAM(S): 10 INJECTION, POWDER, LYOPHILIZED, FOR SOLUTION INTRAVENOUS at 17:50

## 2018-08-21 RX ADMIN — CEFEPIME 100 MILLIGRAM(S): 1 INJECTION, POWDER, FOR SOLUTION INTRAMUSCULAR; INTRAVENOUS at 05:21

## 2018-08-21 RX ADMIN — CEFEPIME 100 MILLIGRAM(S): 1 INJECTION, POWDER, FOR SOLUTION INTRAMUSCULAR; INTRAVENOUS at 14:21

## 2018-08-21 RX ADMIN — VORICONAZOLE 200 MILLIGRAM(S): 10 INJECTION, POWDER, LYOPHILIZED, FOR SOLUTION INTRAVENOUS at 05:20

## 2018-08-21 RX ADMIN — Medication 100 MILLIGRAM(S): at 05:21

## 2018-08-21 RX ADMIN — MAGNESIUM OXIDE 400 MG ORAL TABLET 400 MILLIGRAM(S): 241.3 TABLET ORAL at 17:50

## 2018-08-21 RX ADMIN — Medication 125 MILLIGRAM(S): at 17:50

## 2018-08-21 RX ADMIN — TACROLIMUS 1 MILLIGRAM(S): 5 CAPSULE ORAL at 20:06

## 2018-08-21 RX ADMIN — Medication 3 MILLIGRAM(S): at 07:52

## 2018-08-21 RX ADMIN — Medication 250 MILLIGRAM(S): at 17:50

## 2018-08-21 RX ADMIN — Medication 1 SPRAY(S): at 05:21

## 2018-08-21 RX ADMIN — Medication 1 TABLET(S): at 12:30

## 2018-08-21 RX ADMIN — Medication 100 MILLIGRAM(S): at 22:32

## 2018-08-21 RX ADMIN — Medication 100 MILLIGRAM(S): at 14:21

## 2018-08-21 NOTE — PROGRESS NOTE ADULT - PROBLEM SELECTOR PLAN 2
tacro therapeutic at 8 today, goal 8-10.   will cont 0.5/1  cont prednisone  3mg qd   pt off cellcept due to hx of fungal infection - will cont hold   holding valcyte given leukopenia - repeat CMV PCR   CAV: asa/pravastatin

## 2018-08-21 NOTE — PROGRESS NOTE ADULT - ASSESSMENT
67 y/o male with PMH NICM HFrEF s/p HM2 LVAD 6/2017, who underwent heart transplant on 2/23/18 (CMV D-/R+ and Toxo D-/R+, Hep C+ heart) with post op graft dysfunction who underwent plasmapheresis and IVIG x2 as well as rituximab, with suspected fungal PNA diagnosed 6/2018 on voriconazole, who presented to clinic today after routine blood work, stating he is not feeling well this morning, feels weak, no chills, no reported fever, denies chest pain or sob. Also complains of decreased PO intake for the last few days.  Denies nausea, vomiting or diarrhea. Of note right wrist is slightly tender, warm to touch w/ fluctuance, has had wrist swelling on previous admission with negative workup. Temp in clinic was 102 - most likely secondary to recurrent PNA based on new productive cough x 3 days and RUL infiltrate on CT scan.  8/14 Called by lab> arthrocentesis fluid sent for cell count> state not enough fluid for count  8/15 Still febrile, Tmax 101.8. Sputum Cx with mod gram neg rods. Unable to broaden cefepime- pt with hx of colonized carbapenem pseudomonas per ID. IR consulted to evaluate for transthoracic bx of R lung  8/16 CT guided biopsy of Right lung scheduled for Friday with Dr. Alfred, continued on IV antibiotics. VSS, Pt afebrile Tmax 98.4. Tacro level 11.5  8/17 awaiting Rt Lung bx, afebrile tacro level 10.6 valcyte decreased to every other day to start 8/19.   8/18 VVS; Right lung bx: Gram Stain: polymorphonuclear leukocytes seen. No organisms seen. Prednisone decreased to 3 mg PO daily as per CHF 2/2 infectious process. Continue with current medication regimen.   8/19 vvs: awaiting final results of Cardiac biopsy.  Tacro level 7.7 today   8/20 Pt is feeling better today, seen OOB in chair. Cardiac biopsy results still pending.  tacro level 8.0 today.  pt is afebrile but remains leukopenic. WBC 2.7   8/21 VVS; (+) loose stools. (+) Cdif --> Continue on PO vanco and flagyl IV per ID. Continue with current medication regimen. Start Nepro 1 can daily.  Check CMV PCR level in am. Awaiting lung bx cultures.   Disposition: Home once medically cleared.

## 2018-08-21 NOTE — CONSULT NOTE ADULT - ATTENDING COMMENTS
Marga Martines, PhD  [phone]
Patient seen and examined with Dr. Perez  I agree with his history exam findings and plans as noted above    MR Baez is being readmitted with fever to 102f, non productive cough, mild abdominal pain, which he reports started two days pta.   His WBC of 6.8 with 84% polys and crst. of 1.68  He appears non toxic on exam and his lung exam shows decreased BS at the left base. His sternotomy wounds have healed  His CT scan of the chest is notable for persistence of the LLL infiltrate and a new RUL infiltrate  He was known to be colonized with MDRO pseudomonas on past specimens and was treated with Cefepime last admission    I am concerned about atypical organisms actino, nocardia, mycobacteria. PCP  He needs a tissue diagnosis  Please send quantiferon gold, urine for legionella, urine for histo ag., sputum for fungal stain, serum LDH  Please ask pulmonary to see patient for a possible diagnostic bronchoscopy  In addition to the antibiotics listed above please restart his Mepron at 1500mg/day
TTE results noted. No significant change from prior. No evidence of worsening HF.  Workup of RML consolidation, as per ID. Seems reasonable to get tissue samples from R and L sides.

## 2018-08-21 NOTE — PROGRESS NOTE ADULT - PROBLEM SELECTOR PLAN 1
Pt with h/o colonized carbapenem resistant pseudomonas   will cont vanc, voriconazole, and cefepime per ID   workup thus far negative   s/p lung tissue biopsy with IR on 8/18/2018 - Corynebacterium   will f/u repeat fungitel, aspergillus,   Will discuss ABx regimen with ID - currently on vanc and cefepime and voriconazole   Started on oral vanco and IV flagyl for c. diff

## 2018-08-21 NOTE — PROGRESS NOTE ADULT - SUBJECTIVE AND OBJECTIVE BOX
Follow Up:  Overnight no fevers. Pt still w cough productive of clear sputum. No cp, sob. Eating/drinking well.      ROS:    All other systems negative    Constitutional: no fever, no chills  Head: no trauma  Eyes: no vision changes, no eye pain  ENT:  no sore throat, no rhinorrhea  Cardiovascular:  no chest pain, no palpitation  Respiratory:  no SOB, + cough  GI:  no abd pain, no vomiting, no diarrhea  urinary: no dysuria, no hematuria, no flank pain  musculoskeletal:  no joint pain, no joint swelling  skin:  no rash  neurology:  no headache, no seizure, no change in mental status  psych: no anxiety, no depression         Allergies  No Known Allergies        ANTIMICROBIALS:  atovaquone Suspension 1500 daily  cefepime   IVPB    cefepime   IVPB 1000 every 8 hours  metroNIDAZOLE  IVPB    metroNIDAZOLE  IVPB 500 every 8 hours  vancomycin    Solution 125 every 6 hours  vancomycin  IVPB 750 every 12 hours  voriconazole 200 every 12 hours      OTHER MEDS:  acetaminophen    Suspension 500 milliGRAM(s) Oral every 6 hours PRN  aspirin enteric coated 81 milliGRAM(s) Oral daily  Calcium Citrate + Vit D 315mg/250 IU 2 Tablet(s) 2 Tablet(s) Oral <User Schedule>  docusate sodium 100 milliGRAM(s) Oral two times a day PRN  famotidine    Tablet 20 milliGRAM(s) Oral daily  insulin lispro (HumaLOG) corrective regimen sliding scale   SubCutaneous three times a day before meals  insulin lispro (HumaLOG) corrective regimen sliding scale   SubCutaneous at bedtime  magnesium oxide 400 milliGRAM(s) Oral two times a day with meals  multivitamin 1 Tablet(s) Oral daily  pravastatin 20 milliGRAM(s) Oral at bedtime  predniSONE   Tablet 3 milliGRAM(s) Oral <User Schedule>  sodium bicarbonate 650 milliGRAM(s) Oral two times a day  sodium chloride 0.65% Nasal 1 Spray(s) Both Nostrils three times a day  tacrolimus 0.5 milliGRAM(s) Oral <User Schedule>  tacrolimus 1 milliGRAM(s) Oral <User Schedule>      Vital Signs Last 24 Hrs  T(C): 36.5 (21 Aug 2018 13:35), Max: 36.7 (21 Aug 2018 04:47)  T(F): 97.7 (21 Aug 2018 13:35), Max: 98 (21 Aug 2018 04:47)  HR: 115 (21 Aug 2018 13:35) (103 - 115)  BP: 103/70 (21 Aug 2018 13:35) (103/70 - 130/89)  BP(mean): --  RR: 18 (21 Aug 2018 13:35) (18 - 18)  SpO2: 97% (21 Aug 2018 13:35) (96% - 97%)    Physical Exam:  General:    NAD,  non toxic, A&O x 3  Head: atraumatic, normocephalic  Eye: normal sclera and conjunctiva  ENT:    no oropharyngeal lesions,   no LAD=  Cardio:     regular S1, S2,  no murmur  Respiratory:    clear b/l,    no wheezing  abd:     soft,   BS +,   no tenderness,    no organomegaly  :   no CVAT,  no suprapubic tenderness,   no  diaz  Musculoskeletal:   no joint swelling,   no edema  vascular: no lines, normal pulses  Skin:    no rash  Neurologic:     no focal deficit  psych: normal affect, no suicidal ideation                          7.9    2.4   )-----------( 530      ( 21 Aug 2018 07:26 )             25.0       08-21    134<L>  |  104  |  23  ----------------------------<  134<H>  4.5   |  19<L>  |  0.99    Ca    8.8      21 Aug 2018 07:26          MICROBIOLOGY:  v  .Body Fluid Lung - Upper Lobe Right  08-17-18   Few Corynebacterium species  "Susceptibilities not performed"  --    polymorphonuclear leukocytes seen  No organisms seen  by cytocentrifuge      .Urine Clean Catch (Midstream)  08-15-18   No growth  --  --      .Blood Blood-Peripheral  08-14-18   No growth at 5 days.  --  --      .Abscess Arm - Right  08-14-18   No growth at 5 days  --  --      .Sputum Sputum  08-14-18   Normal Respiratory Heaven present  --    Rare polymorphonuclear leukocytes per low power field  Rare Squamous epithelial cells per low power field  Moderate Gram Negative Rods per oil power field        Toxoplasma IgG Screen: 4.7 IU/mL (06-14-18 @ 09:42)  HIV-1 RNA Quantitative, Viral Load Log: NOT DET. lg /mL (05-31-18 @ 19:10)  CMV IgG Antibody: 5.40 U/mL (05-25-18 @ 17:51)  CMV IgG Antibody: >10.00 U/mL (02-22-18 @ 23:45)  HIV-1 RNA Quantitative, Viral Load Log: NOT DET. lg /mL (02-02-18 @ 19:25)    CMVPCR Log: NotDetec LogIU/mL (08-14 @ 20:39)    Clostridium difficile GDH Toxins A&amp;B, EIA:   Positive (08-18-18 @ 13:44)  Clostridium difficile GDH Interpretation: Positive for toxigenic C. Difficile.  This specimen is positive for C.  Difficile glutamate dehydrogenase (GDH) antigen and positive for C.  Difficile Toxins A & B, by EIA.  GDH is a highly sensitive marker for C.  Difficile that is produced in largeamounts by all C. Difficile strains,  both toxigenic and nontoxigenic.  This assay has not been validated as a  test of cure.  The results of this assay should always be interpreted in  conjunction with patient's clinical history. (08-18-18 @ 13:44)      RADIOLOGY:  CXR- Consolidations within the right lower lobe are unchanged. Mildly   increased left retrocardiac opacity possibly atelectasis. No pneumothorax.    Small right pleural effusion, unchanged.

## 2018-08-21 NOTE — PROGRESS NOTE ADULT - PROBLEM SELECTOR PLAN 6
Pt without pain but admitted with joint swelling of R wrist (no crystals present)   and with transient L 5th digit swelling and mild warmth which has now improved   X-ray R hand c/w inflammatory arthritis

## 2018-08-21 NOTE — PROGRESS NOTE ADULT - SUBJECTIVE AND OBJECTIVE BOX
Pt states cough is improving. Still productive of clear to white sputum.     MEDICATIONS:  aspirin enteric coated 81 milliGRAM(s) Oral daily    atovaquone Suspension 1500 milliGRAM(s) Oral daily  cefepime   IVPB      cefepime   IVPB 1000 milliGRAM(s) IV Intermittent every 8 hours  metroNIDAZOLE  IVPB      metroNIDAZOLE  IVPB 500 milliGRAM(s) IV Intermittent every 8 hours  vancomycin    Solution 125 milliGRAM(s) Oral every 6 hours  vancomycin  IVPB 750 milliGRAM(s) IV Intermittent every 12 hours  voriconazole 200 milliGRAM(s) Oral every 12 hours      acetaminophen    Suspension 500 milliGRAM(s) Oral every 6 hours PRN    docusate sodium 100 milliGRAM(s) Oral two times a day PRN  famotidine    Tablet 20 milliGRAM(s) Oral daily    insulin lispro (HumaLOG) corrective regimen sliding scale   SubCutaneous three times a day before meals  insulin lispro (HumaLOG) corrective regimen sliding scale   SubCutaneous at bedtime  pravastatin 20 milliGRAM(s) Oral at bedtime  predniSONE   Tablet 3 milliGRAM(s) Oral <User Schedule>    magnesium oxide 400 milliGRAM(s) Oral two times a day with meals  multivitamin 1 Tablet(s) Oral daily  sodium bicarbonate 650 milliGRAM(s) Oral two times a day  sodium chloride 0.65% Nasal 1 Spray(s) Both Nostrils three times a day  tacrolimus 0.5 milliGRAM(s) Oral <User Schedule>  tacrolimus 1 milliGRAM(s) Oral <User Schedule>    PHYSICAL EXAM:  T(C): 36.7 (08-21-18 @ 04:47), Max: 36.7 (08-20-18 @ 14:27)  HR: 103 (08-21-18 @ 04:47) (103 - 106)  BP: 130/89 (08-21-18 @ 04:47) (115/83 - 132/92)  RR: 18 (08-21-18 @ 04:47) (18 - 18)  SpO2: 96% (08-21-18 @ 04:47) (96% - 97%)  Wt(kg): --  I&O's Summary    20 Aug 2018 07:01  -  21 Aug 2018 07:00  --------------------------------------------------------  IN: 1570 mL / OUT: 1075 mL / NET: 495 mL    21 Aug 2018 07:01  -  21 Aug 2018 11:40  --------------------------------------------------------  IN: 120 mL / OUT: 300 mL / NET: -180 mL    Appearance: Normal	  HEENT:   Normal oral mucosa, PERRL, EOMI	  Lymphatic: No lymphadenopathy  Cardiovascular: Normal S1 S2, JVP 6, No murmurs, No edema  Respiratory: Lungs clear to auscultation	  Psychiatry: A & O x 3, Mood & affect appropriate  Gastrointestinal:  Soft, Non-tender, + BS	  Skin: No rashes, No ecchymoses  R 3rd digit with dry gangrene   Neurologic: Non-focal  Extremities: Normal range of motion, No clubbing, cyanosis or edema  Vascular: Peripheral pulses palpable  bilaterally    LABS:	 	    CBC Full  -  ( 21 Aug 2018 07:26 )  WBC Count : 2.4 K/uL  Hemoglobin : 7.9 g/dL  Hematocrit : 25.0 %  Platelet Count - Automated : 530 K/uL  Mean Cell Volume : 92.0 fl  Mean Cell Hemoglobin : 29.1 pg  Mean Cell Hemoglobin Concentration : 31.6 gm/dL  Auto Neutrophil # : x  Auto Lymphocyte # : x  Auto Monocyte # : x  Auto Eosinophil # : x  Auto Basophil # : x  Auto Neutrophil % : x  Auto Lymphocyte % : x  Auto Monocyte % : x  Auto Eosinophil % : x  Auto Basophil % : x    08-21    134<L>  |  104  |  23  ----------------------------<  134<H>  4.5   |  19<L>  |  0.99  08-20    137  |  106  |  31<H>  ----------------------------<  103<H>  5.1   |  19<L>  |  1.11    Ca    8.8      21 Aug 2018 07:26  Ca    9.3      20 Aug 2018 07:41

## 2018-08-21 NOTE — PROGRESS NOTE ADULT - ASSESSMENT
67 yo M s/p heart transplant complicated by rejection with rounds of plasmaphoresis , IVIG, rituximab x2, known colonizer with  Pseudomonas last treated for PNA in June 2018 with cefepime and voriconazole now presents with fever, decreased PO intake, productive cough. Ddx include recurrent pneumonia vs gout flare  vs other infectious process.     Suggest:  -CT guided biopsy cultures are NGTD- only grew a corynebacterium that is likely not a pathogen  pathology of the CT biopsy also without definitive diagnosis  - D/c IV Vancomycin  - Continue the Cefepime and Voriconazole    - f/u fungitell and galactomannan and QuantiFeron- CT chest suspicious for fungal infxn    - check CMV PCR, Hep C PCR, crypt Ag    - C.diff- diarrhea improved with oral Vanco/IV flagyl ( 2 episodes diarrhea today)    - OI prophylaxis with Mepron/Valganciclovir    - f/u path    Will discuss next steps with Cardiology team

## 2018-08-21 NOTE — PROGRESS NOTE ADULT - SUBJECTIVE AND OBJECTIVE BOX
VITAL SIGNS    Subjective: "I'm feeling ok." (+) Diarrhea.  Denies CP, palpitation, SOB, LOBATO, N/V/D, HA, fever or chills. No acute events noted overnight.       Telemetry: NSR        Vital Signs Last 24 Hrs  T(C): 36.7 (18 @ 04:47), Max: 36.7 (18 @ 14:27)  T(F): 98 (18 @ 04:47), Max: 98 (18 @ 14:27)  HR: 103 (18 @ 04:47) (103 - 106)  BP: 130/89 (18 @ 04:47) (115/83 - 132/92)  RR: 18 (18 @ 04:47) (18 - 18)  SpO2: 96% (18 @ 04:47) (96% - 97%)            @ 07:01  -   @ 07:00  --------------------------------------------------------  IN: 1570 mL / OUT: 1075 mL / NET: 495 mL     @ 07:01  -   @ 11:43  --------------------------------------------------------  IN: 120 mL / OUT: 300 mL / NET: -180 mL    Daily     Daily Weight in k.6 (21 Aug 2018 07:30)    CAPILLARY BLOOD GLUCOSE    POCT Blood Glucose.: 133 mg/dL (21 Aug 2018 07:22)  POCT Blood Glucose.: 97 mg/dL (20 Aug 2018 21:24)  POCT Blood Glucose.: 99 mg/dL (20 Aug 2018 16:12)  POCT Blood Glucose.: 111 mg/dL (20 Aug 2018 11:50)                     PHYSICAL EXAM    Neurology: alert and oriented x 3, nonfocal, no gross deficits    CV: (+) S1 and S2, No murmurs, rubs, gallops or clicks     Sternal Wound:  MSI --> Healed; sternum stable     Lungs: CTA B/L     Abdomen: soft, nontender, nondistended, positive bowel sounds, (+) Flatus; (+) BM     :  Voiding               Extremities:  B/L LE (+) trace edema; negative calf tenderness; (+) 2 DP palpable        acetaminophen Suspension 500 milliGRAM(s) Oral every 6 hours PRN  aspirin enteric coated 81 milliGRAM(s) Oral daily  atovaquone Suspension 1500 milliGRAM(s) Oral daily  Calcium Citrate + Vit D 315mg/250 IU 2 Tablet(s) 2 Tablet(s) Oral <User Schedule>  cefepime IVPB      cefepime IVPB 1000 milliGRAM(s) IV Intermittent every 8 hours  docusate sodium 100 milliGRAM(s) Oral two times a day PRN  famotidine  Tablet 20 milliGRAM(s) Oral daily  insulin lispro (HumaLOG) corrective regimen sliding scale   SubCutaneous three times a day before meals  insulin lispro (HumaLOG) corrective regimen sliding scale   SubCutaneous at bedtime  magnesium oxide 400 milliGRAM(s) Oral two times a day with meals  metroNIDAZOLE  IVPB      metroNIDAZOLE  IVPB 500 milliGRAM(s) IV Intermittent every 8 hours  multivitamin 1 Tablet(s) Oral daily  pravastatin 20 milliGRAM(s) Oral at bedtime  predniSONE   Tablet 3 milliGRAM(s) Oral <User Schedule>  sodium bicarbonate 650 milliGRAM(s) Oral two times a day  sodium chloride 0.65% Nasal 1 Spray(s) Both Nostrils three times a day  tacrolimus 0.5 milliGRAM(s) Oral <User Schedule>  tacrolimus 1 milliGRAM(s) Oral <User Schedule>  vancomycin    Solution 125 milliGRAM(s) Oral every 6 hours  vancomycin  IVPB 750 milliGRAM(s) IV Intermittent every 12 hours  voriconazole 200 milliGRAM(s) Oral every 12 hours    Physical Therapy Rec:   Home  [ X ]   Home w/ PT  [  ]  Rehab  [  ]    Discussed with Cardiothoracic Team at AM rounds.

## 2018-08-21 NOTE — PROGRESS NOTE ADULT - PROBLEM SELECTOR PROBLEM 7
Other decreased white blood cell (WBC) count

## 2018-08-21 NOTE — CONSULT NOTE ADULT - SUBJECTIVE AND OBJECTIVE BOX
Behavioral Cardiology Psychological Assessment   Information obtained from patient and chart.  History of present illness:  Mr. 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  Now presenting with fever, decreased PO intake, productive cough.    Social history:  , only son . Lives in private house he owns in [place] with his brother.  Patient’s wife  in .  He has a 15 year old granddaughter who lives with her mother close by.  Good support from family and close friend; has home health aide 7 days/week. On disability.  Education: 10th Grade (very limited literacy skills).      Substance use:   ·	Tobacco: former use; quit 50 years ago, smoked 2 cigarettes day for 3 years.   §	Alcohol: former; drank 4-5 drinks of rum 5 days/week for 40 years; stopped 3   years ago.      ·	Drug: occasional marijuana use many years ago; no other substance use.     Understanding of health history/treatment plan:  Good understanding of reasons for admission "I wasn’t feeling good for 2 days and came in for my appointment, they told me I had a fever and admitted me."   Reports he has assistance at home with managing all aspects of his care including heart transplant guidelines from close friend, brother, home health aides (7 days/week) and [facility].  Understands the importance of taking all medications as prescribed most importantly his immunosuppression medications.  Reports he never forgets to take these medications.   Psychological assessment:    ·	Past psychiatric history: None.    ·	Depression: denies.     ·	Anxiety:  denies.     ·	Coping strategies: Talking with family and staff, watching TV, his pastora, talking to his granddaughter   ·	Sleep:  sleeps well.     ·	Appetite:  Good.      Mental Status Exam:  Seen sitting in chair. A&Ox3.  Well-related with good eye contact. Speech normal rate and volume. Thought process goal directed. No evidence of any psychosis, delusions, jona. Mood "good." Affect full range. Insight and judgment adequate.      Impression: Mr. Baez is a 68 year-old man admitted with fever, decreased PO intake, productive cough.  Reports he is coping well with adjustment to living with heart transplant.  Feels he has good support system to help him with necessary life style changes.  Denies symptoms of anxiety/depression.   Dx:  r/o Adjustment disorder; F43.20     Recommendations:   Behavioral Cardiology will follow as needed

## 2018-08-21 NOTE — PROGRESS NOTE ADULT - PROBLEM SELECTOR PLAN 3
Continue with Vanco 125 mg PO every 6 hours   Continue with Flagyl 500 mg IV every 8 hours   ID following

## 2018-08-21 NOTE — PROGRESS NOTE ADULT - ASSESSMENT
68 year old man PMH NICM HFrEF s/p HM2 LVAD 6/2017, who underwent heart transplant on 2/23/18 (CMV D-/R+ and Toxo D-/R+, Hep C+ heart) with post op graft dysfunction who underwent plasmapheresis and IVIG x2 as well as rituximab, with suspected fungal PNA diagnosed 6/2018 on voriconazole who presented with fevers from outpt clinic appointment - secondary to recurrent PNA with new RUL infiltrate on CT scan - improving on broad spectrum antibiotics. S/p lung biopsy 8/18. C diff toxin positive - on oral vanco and flagyl with improvement.

## 2018-08-22 LAB
ANION GAP SERPL CALC-SCNC: 11 MMOL/L — SIGNIFICANT CHANGE UP (ref 5–17)
BUN SERPL-MCNC: 23 MG/DL — SIGNIFICANT CHANGE UP (ref 7–23)
CALCIUM SERPL-MCNC: 9.4 MG/DL — SIGNIFICANT CHANGE UP (ref 8.4–10.5)
CHLORIDE SERPL-SCNC: 107 MMOL/L — SIGNIFICANT CHANGE UP (ref 96–108)
CO2 SERPL-SCNC: 20 MMOL/L — LOW (ref 22–31)
CREAT SERPL-MCNC: 1.07 MG/DL — SIGNIFICANT CHANGE UP (ref 0.5–1.3)
CRYPTOC AG FLD QL: NEGATIVE — SIGNIFICANT CHANGE UP
CULTURE RESULTS: SIGNIFICANT CHANGE UP
FUNGITELL: <31 PG/ML — SIGNIFICANT CHANGE UP (ref 0–59)
GALACTOMANNAN AG SERPL-ACNC: <0.5 INDEX — SIGNIFICANT CHANGE UP
GLUCOSE BLDC GLUCOMTR-MCNC: 103 MG/DL — HIGH (ref 70–99)
GLUCOSE BLDC GLUCOMTR-MCNC: 128 MG/DL — HIGH (ref 70–99)
GLUCOSE BLDC GLUCOMTR-MCNC: 140 MG/DL — HIGH (ref 70–99)
GLUCOSE BLDC GLUCOMTR-MCNC: 94 MG/DL — SIGNIFICANT CHANGE UP (ref 70–99)
GLUCOSE SERPL-MCNC: 97 MG/DL — SIGNIFICANT CHANGE UP (ref 70–99)
HCT VFR BLD CALC: 26.6 % — LOW (ref 39–50)
HGB BLD-MCNC: 8.2 G/DL — LOW (ref 13–17)
MCHC RBC-ENTMCNC: 28.2 PG — SIGNIFICANT CHANGE UP (ref 27–34)
MCHC RBC-ENTMCNC: 30.8 GM/DL — LOW (ref 32–36)
MCV RBC AUTO: 91.6 FL — SIGNIFICANT CHANGE UP (ref 80–100)
NON-GYNECOLOGICAL CYTOLOGY STUDY: SIGNIFICANT CHANGE UP
PLATELET # BLD AUTO: 543 K/UL — HIGH (ref 150–400)
POTASSIUM SERPL-MCNC: 4.8 MMOL/L — SIGNIFICANT CHANGE UP (ref 3.5–5.3)
POTASSIUM SERPL-SCNC: 4.8 MMOL/L — SIGNIFICANT CHANGE UP (ref 3.5–5.3)
RBC # BLD: 2.9 M/UL — LOW (ref 4.2–5.8)
RBC # FLD: 20 % — HIGH (ref 10.3–14.5)
SODIUM SERPL-SCNC: 138 MMOL/L — SIGNIFICANT CHANGE UP (ref 135–145)
SPECIMEN SOURCE: SIGNIFICANT CHANGE UP
TACROLIMUS SERPL-MCNC: 7.5 NG/ML — SIGNIFICANT CHANGE UP
VANCOMYCIN TROUGH SERPL-MCNC: 18.1 UG/ML — SIGNIFICANT CHANGE UP (ref 10–20)
WBC # BLD: 3.2 K/UL — LOW (ref 3.8–10.5)
WBC # FLD AUTO: 3.2 K/UL — LOW (ref 3.8–10.5)

## 2018-08-22 PROCEDURE — 99232 SBSQ HOSP IP/OBS MODERATE 35: CPT | Mod: GC

## 2018-08-22 PROCEDURE — 71045 X-RAY EXAM CHEST 1 VIEW: CPT | Mod: 26

## 2018-08-22 PROCEDURE — 99232 SBSQ HOSP IP/OBS MODERATE 35: CPT

## 2018-08-22 PROCEDURE — 99233 SBSQ HOSP IP/OBS HIGH 50: CPT | Mod: GC

## 2018-08-22 RX ADMIN — FAMOTIDINE 20 MILLIGRAM(S): 10 INJECTION INTRAVENOUS at 12:05

## 2018-08-22 RX ADMIN — Medication 1 TABLET(S): at 12:05

## 2018-08-22 RX ADMIN — MAGNESIUM OXIDE 400 MG ORAL TABLET 400 MILLIGRAM(S): 241.3 TABLET ORAL at 08:03

## 2018-08-22 RX ADMIN — TACROLIMUS 0.5 MILLIGRAM(S): 5 CAPSULE ORAL at 08:03

## 2018-08-22 RX ADMIN — MAGNESIUM OXIDE 400 MG ORAL TABLET 400 MILLIGRAM(S): 241.3 TABLET ORAL at 17:55

## 2018-08-22 RX ADMIN — Medication 125 MILLIGRAM(S): at 12:05

## 2018-08-22 RX ADMIN — VORICONAZOLE 200 MILLIGRAM(S): 10 INJECTION, POWDER, LYOPHILIZED, FOR SOLUTION INTRAVENOUS at 06:31

## 2018-08-22 RX ADMIN — Medication 125 MILLIGRAM(S): at 06:31

## 2018-08-22 RX ADMIN — Medication 81 MILLIGRAM(S): at 12:06

## 2018-08-22 RX ADMIN — Medication 650 MILLIGRAM(S): at 17:55

## 2018-08-22 RX ADMIN — CEFEPIME 100 MILLIGRAM(S): 1 INJECTION, POWDER, FOR SOLUTION INTRAMUSCULAR; INTRAVENOUS at 06:31

## 2018-08-22 RX ADMIN — Medication 3 MILLIGRAM(S): at 08:03

## 2018-08-22 RX ADMIN — ATOVAQUONE 1500 MILLIGRAM(S): 750 SUSPENSION ORAL at 12:05

## 2018-08-22 RX ADMIN — Medication 650 MILLIGRAM(S): at 06:31

## 2018-08-22 RX ADMIN — Medication 100 MILLIGRAM(S): at 14:06

## 2018-08-22 RX ADMIN — CEFEPIME 100 MILLIGRAM(S): 1 INJECTION, POWDER, FOR SOLUTION INTRAMUSCULAR; INTRAVENOUS at 22:08

## 2018-08-22 RX ADMIN — Medication 125 MILLIGRAM(S): at 23:05

## 2018-08-22 RX ADMIN — Medication 1 SPRAY(S): at 22:08

## 2018-08-22 RX ADMIN — CEFEPIME 100 MILLIGRAM(S): 1 INJECTION, POWDER, FOR SOLUTION INTRAMUSCULAR; INTRAVENOUS at 14:06

## 2018-08-22 RX ADMIN — TACROLIMUS 1 MILLIGRAM(S): 5 CAPSULE ORAL at 20:01

## 2018-08-22 RX ADMIN — Medication 100 MILLIGRAM(S): at 22:07

## 2018-08-22 RX ADMIN — Medication 100 MILLIGRAM(S): at 06:31

## 2018-08-22 RX ADMIN — VORICONAZOLE 200 MILLIGRAM(S): 10 INJECTION, POWDER, LYOPHILIZED, FOR SOLUTION INTRAVENOUS at 17:55

## 2018-08-22 RX ADMIN — Medication 20 MILLIGRAM(S): at 22:08

## 2018-08-22 RX ADMIN — Medication 125 MILLIGRAM(S): at 17:55

## 2018-08-22 NOTE — PROGRESS NOTE ADULT - ASSESSMENT
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 Also complains of decreased PO intake for the last few days.  Denies nausea, vomiting or diarrhea. Of note right wrist is slightly tender, warm to touch w/ fluctuance, has had wrist swelling on previous admission with negative workup. Temp in clinic was 102 - most likely secondary to recurrent PNA based on new productive cough x 3 days and RUL infiltrate on CT scan.  8/14 Called by lab> arthrocentesis fluid sent for cell count> state not enough fluid for count  8/15 Still febrile, Tmax 101.8. Sputum Cx with mod gram neg rods. Unable to broaden cefepime- pt with hx of colonized carbapenem pseudomonas per ID. IR consulted to evaluate for transthoracic bx of R lung  8/16 CT guided biopsy of Right lung scheduled for Friday with Dr. Alfred, continued on IV antibiotics. VSS, Pt afebrile Tmax 98.4. Tacro level 11.5  8/17 awaiting Rt Lung bx, afebrile tacro level 10.6 valcyte decreased to every other day to start 8/19.   8/18 VVS; Right lung bx: Gram Stain: polymorphonuclear leukocytes seen. No organisms seen. Prednisone decreased to 3 mg PO daily as per CHF 2/2 infectious process. Continue with current medication regimen.   8/19 vvs: awaiting final results of Cardiac biopsy.  Tacro level 7.7 today   8/20 Pt is feeling better today, seen OOB in chair. Cardiac biopsy results still pending.  tacro level 8.0 today.  pt is afebrile but remains leukopenic. WBC 2.7   8/21 VVS; (+) loose stools. (+) Cdif --> Continue on PO vanco and flagyl IV per ID. Continue with current medication regimen. Start Nepro 1 can daily.  Check CMV PCR level in am. Awaiting lung bx cultures.   8/22    vss    awaiting  lab results including cmv   and rt wrist tap

## 2018-08-22 NOTE — PROGRESS NOTE ADULT - PROBLEM SELECTOR PLAN 2
check daily tacrolimus level   Continue tacro 0.5 BID    pt off cellcept due to hx of fungal infection - will cont hold   Continue ASA 81 and  Pravastatin 20 mg for CAD  Rheum consult placed for Rt upper extremity joints aches and swelling

## 2018-08-22 NOTE — PROGRESS NOTE ADULT - PROBLEM SELECTOR PLAN 3
Continue with Vanco 125 mg PO every 6 hours   Continue with Flagyl 500 mg IV every 8 hours   ID following   no loose stool today

## 2018-08-22 NOTE — PROGRESS NOTE ADULT - PROBLEM SELECTOR PLAN 1
Pt with h/o colonized carbapenem resistant pseudomonas   will cont vanc, voriconazole, and cefepime per ID   workup thus far negative   s/p lung tissue biopsy with IR on 8/18/2018 - Corynebacterium   will f/u repeat fungitel, aspergillus,

## 2018-08-22 NOTE — PROGRESS NOTE ADULT - ASSESSMENT
69 yo M s/p heart transplant complicated by rejection with rounds of plasmaphoresis , IVIG, rituximab x2, known colonizer with  Pseudomonas last treated for PNA in June 2018 with cefepime and voriconazole now presents with fever, decreased PO intake, productive cough. Ddx include recurrent pneumonia vs gout flare  vs other infectious process.     Fungitell negative    FULL NOTE TO FOLLOW 67 yo M s/p heart transplant complicated by rejection with rounds of plasmaphoresis , IVIG, rituximab x2, known colonizer with  Pseudomonas last treated for PNA in June 2018 with cefepime and voriconazole now presents with fever, decreased PO intake, productive cough. Pt had CT guided R lung mass bx on 8/17. Ddx include recurrent pneumonia vs gout flare  vs other infectious process.     Fungitell negative  Quant indeterminate, however lung biopsy w negative AFB stain    Recommend:   -CT guided biopsy cultures are NGTD- only grew a corynebacterium that is likely not a pathogen  - Pathology report from 8/17 R lung bx showing inflammation w negative AFB and GMS stain  - f/u galactomannan, crypt Ag- CT chest suspicious for fungal infection  - f/u CMV PCR, Hep C PCR  - Continue the Cefepime and Voriconazole for now  - C.diff- diarrhea improving with oral Vanco/IV flagyl   - OI prophylaxis with Mepron; off Valganciclovir      Will discuss next steps with Cardiology team

## 2018-08-22 NOTE — PROGRESS NOTE ADULT - SUBJECTIVE AND OBJECTIVE BOX
Pt states cough improving. Had one BM this am that was more formed.     MEDICATIONS:  aspirin enteric coated 81 milliGRAM(s) Oral daily    atovaquone Suspension 1500 milliGRAM(s) Oral daily  cefepime   IVPB      cefepime   IVPB 1000 milliGRAM(s) IV Intermittent every 8 hours  metroNIDAZOLE  IVPB      metroNIDAZOLE  IVPB 500 milliGRAM(s) IV Intermittent every 8 hours  vancomycin    Solution 125 milliGRAM(s) Oral every 6 hours  voriconazole 200 milliGRAM(s) Oral every 12 hours      acetaminophen    Suspension 500 milliGRAM(s) Oral every 6 hours PRN    docusate sodium 100 milliGRAM(s) Oral two times a day PRN  famotidine    Tablet 20 milliGRAM(s) Oral daily    insulin lispro (HumaLOG) corrective regimen sliding scale   SubCutaneous three times a day before meals  insulin lispro (HumaLOG) corrective regimen sliding scale   SubCutaneous at bedtime  pravastatin 20 milliGRAM(s) Oral at bedtime  predniSONE   Tablet 3 milliGRAM(s) Oral <User Schedule>    magnesium oxide 400 milliGRAM(s) Oral two times a day with meals  multivitamin 1 Tablet(s) Oral daily  sodium bicarbonate 650 milliGRAM(s) Oral two times a day  sodium chloride 0.65% Nasal 1 Spray(s) Both Nostrils three times a day  tacrolimus 0.5 milliGRAM(s) Oral <User Schedule>  tacrolimus 1 milliGRAM(s) Oral <User Schedule>    PHYSICAL EXAM:  T(C): 36.8 (08-22-18 @ 13:14), Max: 36.8 (08-22-18 @ 13:14)  HR: 112 (08-22-18 @ 13:14) (103 - 112)  BP: 104/81 (08-22-18 @ 13:14) (104/81 - 130/88)  RR: 18 (08-22-18 @ 13:14) (18 - 18)  SpO2: 95% (08-22-18 @ 13:14) (94% - 95%)  Wt(kg): --  I&O's Summary    21 Aug 2018 07:01  -  22 Aug 2018 07:00  --------------------------------------------------------  IN: 760 mL / OUT: 1950 mL / NET: -1190 mL    22 Aug 2018 07:01  -  22 Aug 2018 15:37  --------------------------------------------------------  IN: 356 mL / OUT: 500 mL / NET: -144 mL    Appearance: Normal	  HEENT:   Normal oral mucosa, PERRL, EOMI	  Lymphatic: No lymphadenopathy  Cardiovascular: Normal S1 S2, JVP 6, No murmurs, No edema  Respiratory: Lungs clear to auscultation	  Psychiatry: A & O x 3, Mood & affect appropriate  Gastrointestinal:  Soft, Non-tender, + BS	  Skin: No rashes, No ecchymoses  R 3rd digit with dry gangrene   Neurologic: Non-focal  Extremities: Normal range of motion, No clubbing, cyanosis or edema  Vascular: Peripheral pulses palpable  bilaterally    LABS:	 	    CBC Full  -  ( 22 Aug 2018 07:44 )  WBC Count : 3.2 K/uL  Hemoglobin : 8.2 g/dL  Hematocrit : 26.6 %  Platelet Count - Automated : 543 K/uL  Mean Cell Volume : 91.6 fl  Mean Cell Hemoglobin : 28.2 pg  Mean Cell Hemoglobin Concentration : 30.8 gm/dL  Auto Neutrophil # : x  Auto Lymphocyte # : x  Auto Monocyte # : x  Auto Eosinophil # : x  Auto Basophil # : x  Auto Neutrophil % : x  Auto Lymphocyte % : x  Auto Monocyte % : x  Auto Eosinophil % : x  Auto Basophil % : x    08-22    138  |  107  |  23  ----------------------------<  97  4.8   |  20<L>  |  1.07  08-21    134<L>  |  104  |  23  ----------------------------<  134<H>  4.5   |  19<L>  |  0.99    Ca    9.4      22 Aug 2018 07:44  Ca    8.8      21 Aug 2018 07:26

## 2018-08-22 NOTE — PROGRESS NOTE ADULT - PROBLEM SELECTOR PLAN 1
CT Chest with new right upper lobe consolidation consistent with pneumonia.  Continue with vancomycin, voriconazole and cefepime per ID  Continue with Valcyte qod  f/u finalized sputum Cx results. 8/14 BCx and urine Cx negative to date    Awaiting CT guided lung biopsy results

## 2018-08-22 NOTE — PROGRESS NOTE ADULT - PROBLEM SELECTOR PLAN 2
acro subtherapeutic at  today 7.5, goal 8-10  will cont 0.5/1  cont prednisone  3mg qd   pt off cellcept due to hx of fungal infection - will cont hold   Pt intermediate risk CMV (D-/R+)   Intermediate risk for toxo (D-/R+)  holding valcyte given leukopenia - repeat CMV PCR   Hep C + donor s/p course of Mayvret with undetectable viral load    CAV: asa/pravastatin

## 2018-08-22 NOTE — PROGRESS NOTE ADULT - SUBJECTIVE AND OBJECTIVE BOX
VITAL SIGNS    Telemetry:  sr      Vital Signs Last 24 Hrs  T(C): 36.5 (18 @ 05:02), Max: 36.7 (18 @ 21:15)  T(F): 97.7 (18 @ 05:02), Max: 98.1 (18 @ 21:15)  HR: 105 (18 @ 05:02) (103 - 115)  BP: 130/88 (18 @ 05:02) (103/70 - 130/88)  RR: 18 (18 @ 05:02) (18 - 18)  SpO2: 94% (18 @ 05:02) (94% - 97%)           Daily Weight in k.8 (22 Aug 2018 08:09)        CAPILLARY BLOOD GLUCOSE      POCT Blood Glucose.: 94 mg/dL (22 Aug 2018 07:45)  POCT Blood Glucose.: 89 mg/dL (21 Aug 2018 21:56)  POCT Blood Glucose.: 103 mg/dL (21 Aug 2018 18:21)  POCT Blood Glucose.: 104 mg/dL (21 Aug 2018 11:43)                             PHYSICAL EXAM    Neurology: alert and oriented x 3, moves all extremities with no defecits  CV :  RRR  Sternal Wound :  CDI , Stable  Lungs:    rt side diminshed  Abdomen: soft, nontender, nondistended, positive bowel sounds, last bowel movement     Extremities:     no edema   no calf tenderness VITAL SIGNS    Telemetry:  sr      Vital Signs Last 24 Hrs  T(C): 36.5 (18 @ 05:02), Max: 36.7 (18 @ 21:15)  T(F): 97.7 (18 @ 05:02), Max: 98.1 (18 @ 21:15)  HR: 105 (18 @ 05:02) (103 - 115)  BP: 130/88 (18 @ 05:02) (103/70 - 130/88)  RR: 18 (18 @ 05:02) (18 - 18)  SpO2: 94% (18 @ 05:02) (94% - 97%)           Daily Weight in k.8 (22 Aug 2018 08:09)        CAPILLARY BLOOD GLUCOSE      POCT Blood Glucose.: 94 mg/dL (22 Aug 2018 07:45)  POCT Blood Glucose.: 89 mg/dL (21 Aug 2018 21:56)  POCT Blood Glucose.: 103 mg/dL (21 Aug 2018 18:21)  POCT Blood Glucose.: 104 mg/dL (21 Aug 2018 11:43)                             PHYSICAL EXAM  S  I have no chest pain   some SOB"  Neurology: alert and oriented x 3, moves all extremities with no defecits  CV :  RRR  Sternal Wound :  CDI , Stable  Lungs:    rt side diminshed  Abdomen: soft, nontender, nondistended, positive bowel sounds, last bowel movement     Extremities:     no edema   no calf tenderness

## 2018-08-22 NOTE — PROGRESS NOTE ADULT - SUBJECTIVE AND OBJECTIVE BOX
Follow Up:  Overnight no fevers. Pt states doing well. Still w cough. Eating/drinking well. No n/v.      ROS:    All other systems negative    Constitutional: no fever, no chills  Head: no trauma  Eyes: no vision changes, no eye pain  ENT:  no sore throat, no rhinorrhea  Cardiovascular:  no chest pain, no palpitation  Respiratory:  no SOB, +cough  GI:  no abd pain, no vomiting, no diarrhea  urinary: no dysuria, no hematuria, no flank pain  musculoskeletal:  no joint pain, no joint swelling  skin:  no rash  neurology:  no headache, no seizure, no change in mental status  psych: no anxiety, no depression         Allergies  No Known Allergies        ANTIMICROBIALS:  atovaquone Suspension 1500 daily  cefepime   IVPB    cefepime   IVPB 1000 every 8 hours  metroNIDAZOLE  IVPB    metroNIDAZOLE  IVPB 500 every 8 hours  vancomycin    Solution 125 every 6 hours  voriconazole 200 every 12 hours      OTHER MEDS:  acetaminophen    Suspension 500 milliGRAM(s) Oral every 6 hours PRN  aspirin enteric coated 81 milliGRAM(s) Oral daily  Calcium Citrate + Vit D 315mg/250 IU 2 Tablet(s) 2 Tablet(s) Oral <User Schedule>  docusate sodium 100 milliGRAM(s) Oral two times a day PRN  famotidine    Tablet 20 milliGRAM(s) Oral daily  insulin lispro (HumaLOG) corrective regimen sliding scale   SubCutaneous three times a day before meals  insulin lispro (HumaLOG) corrective regimen sliding scale   SubCutaneous at bedtime  magnesium oxide 400 milliGRAM(s) Oral two times a day with meals  multivitamin 1 Tablet(s) Oral daily  pravastatin 20 milliGRAM(s) Oral at bedtime  predniSONE   Tablet 3 milliGRAM(s) Oral <User Schedule>  sodium bicarbonate 650 milliGRAM(s) Oral two times a day  sodium chloride 0.65% Nasal 1 Spray(s) Both Nostrils three times a day  tacrolimus 0.5 milliGRAM(s) Oral <User Schedule>  tacrolimus 1 milliGRAM(s) Oral <User Schedule>      Vital Signs Last 24 Hrs  T(C): 36.5 (22 Aug 2018 05:02), Max: 36.7 (21 Aug 2018 21:15)  T(F): 97.7 (22 Aug 2018 05:02), Max: 98.1 (21 Aug 2018 21:15)  HR: 105 (22 Aug 2018 05:02) (103 - 115)  BP: 130/88 (22 Aug 2018 05:02) (103/70 - 130/88)  BP(mean): --  RR: 18 (22 Aug 2018 05:02) (18 - 18)  SpO2: 94% (22 Aug 2018 05:02) (94% - 97%)    Physical Exam:  General:    NAD,  non toxic, A&O x 3  Head: atraumatic, normocephalic  Eye: normal sclera and conjunctiva  ENT:    no oropharyngeal lesions,   no LAD,   neck supple  Cardio:     regular S1, S2  Respiratory:    clear b/l,    no wheezing  abd:     soft,   BS +,   no tenderness,    no organomegaly  :   no CVAT,  no suprapubic tenderness,   no  diaz  Musculoskeletal:   no joint swelling,   no edema  vascular: no lines, normal pulses  Skin:    no rash  Neurologic:     no focal deficit  psych: normal affect, no suicidal ideation                          8.2    3.2   )-----------( 543      ( 22 Aug 2018 07:44 )             26.6       08-22    138  |  107  |  23  ----------------------------<  97  4.8   |  20<L>  |  1.07    Ca    9.4      22 Aug 2018 07:44            MICROBIOLOGY:  Vancomycin Level, Trough: 18.1 ug/mL (08-22-18 @ 07:44)  v  .Body Fluid Lung - Upper Lobe Right  08-17-18   Few Corynebacterium species  "Susceptibilities not performed"  --    polymorphonuclear leukocytes seen  No organisms seen  by cytocentrifuge      .Urine Clean Catch (Midstream)  08-15-18   No growth  --  --      .Blood Blood-Peripheral  08-14-18   No growth at 5 days.  --  --      .Abscess Arm - Right  08-14-18   No growth at 5 days  --  --      .Sputum Sputum  08-14-18   Normal Respiratory Heaven present  --    Rare polymorphonuclear leukocytes per low power field  Rare Squamous epithelial cells per low power field  Moderate Gram Negative Rods per oil power field        Toxoplasma IgG Screen: 4.7 IU/mL (06-14-18 @ 09:42)  HIV-1 RNA Quantitative, Viral Load Log: NOT DET. lg /mL (05-31-18 @ 19:10)  CMV IgG Antibody: 5.40 U/mL (05-25-18 @ 17:51)  CMV IgG Antibody: >10.00 U/mL (02-22-18 @ 23:45)  HIV-1 RNA Quantitative, Viral Load Log: NOT DET. lg /mL (02-02-18 @ 19:25)      Clostridium difficile GDH Toxins A&amp;B, EIA:   Positive (08-18-18 @ 13:44)  Clostridium difficile GDH Interpretation: Positive for toxigenic C. Difficile.  This specimen is positive for C.  Difficile glutamate dehydrogenase (GDH) antigen and positive for C.  Difficile Toxins A & B, by EIA.  GDH is a highly sensitive marker for C.  Difficile that is produced in largeamounts by all C. Difficile strains,  both toxigenic and nontoxigenic.  This assay has not been validated as a  test of cure.  The results of this assay should always be interpreted in  conjunction with patient's clinical history. (08-18-18 @ 13:44)      RADIOLOGY:  CXR- Consolidations within the right lower lobe are unchanged. Mildly   increased left retrocardiac opacity possibly atelectasis. No pneumothorax.    Small right pleural effusion, unchanged. Follow Up:  Overnight no fevers. Pt states doing well. Still w cough. Eating/drinking well. No n/v.      ROS:    All other systems negative    Constitutional: no fever, no chills  Head: no trauma  Eyes: no vision changes, no eye pain  ENT:  no sore throat, no rhinorrhea  Cardiovascular:  no chest pain, no palpitation  Respiratory:  no SOB, +cough  GI:  no abd pain, no vomiting, no diarrhea  urinary: no dysuria, no hematuria, no flank pain  musculoskeletal:  no joint pain, no joint swelling  skin:  no rash  neurology:  no headache, no seizure, no change in mental status  psych: no anxiety, no depression         Allergies  No Known Allergies        ANTIMICROBIALS:  atovaquone Suspension 1500 daily  cefepime   IVPB    cefepime   IVPB 1000 every 8 hours  metroNIDAZOLE  IVPB    metroNIDAZOLE  IVPB 500 every 8 hours  vancomycin    Solution 125 every 6 hours  voriconazole 200 every 12 hours      OTHER MEDS:  acetaminophen    Suspension 500 milliGRAM(s) Oral every 6 hours PRN  aspirin enteric coated 81 milliGRAM(s) Oral daily  Calcium Citrate + Vit D 315mg/250 IU 2 Tablet(s) 2 Tablet(s) Oral <User Schedule>  docusate sodium 100 milliGRAM(s) Oral two times a day PRN  famotidine    Tablet 20 milliGRAM(s) Oral daily  insulin lispro (HumaLOG) corrective regimen sliding scale   SubCutaneous three times a day before meals  insulin lispro (HumaLOG) corrective regimen sliding scale   SubCutaneous at bedtime  magnesium oxide 400 milliGRAM(s) Oral two times a day with meals  multivitamin 1 Tablet(s) Oral daily  pravastatin 20 milliGRAM(s) Oral at bedtime  predniSONE   Tablet 3 milliGRAM(s) Oral <User Schedule>  sodium bicarbonate 650 milliGRAM(s) Oral two times a day  sodium chloride 0.65% Nasal 1 Spray(s) Both Nostrils three times a day  tacrolimus 0.5 milliGRAM(s) Oral <User Schedule>  tacrolimus 1 milliGRAM(s) Oral <User Schedule>      Vital Signs Last 24 Hrs  T(C): 36.5 (22 Aug 2018 05:02), Max: 36.7 (21 Aug 2018 21:15)  T(F): 97.7 (22 Aug 2018 05:02), Max: 98.1 (21 Aug 2018 21:15)  HR: 105 (22 Aug 2018 05:02) (103 - 115)  BP: 130/88 (22 Aug 2018 05:02) (103/70 - 130/88)  BP(mean): --  RR: 18 (22 Aug 2018 05:02) (18 - 18)  SpO2: 94% (22 Aug 2018 05:02) (94% - 97%)    Physical Exam:  General:    NAD,  non toxic, A&O x 3  Head: atraumatic, normocephalic  Eye: normal sclera and conjunctiva  ENT:    no oropharyngeal lesions,   no LAD,   neck supple  Cardio:     regular S1, S2  Respiratory:    clear b/l,    no wheezing  abd:     soft,   BS +,   no tenderness,    no organomegaly  :   no CVAT,  no suprapubic tenderness,   no  diaz  Musculoskeletal:   no joint swelling,   no edema  vascular: no lines, normal pulses  Skin:    no rash  Neurologic:     no focal deficit  psych: normal affect, no suicidal ideation                          8.2    3.2   )-----------( 543      ( 22 Aug 2018 07:44 )             26.6       08-22    138  |  107  |  23  ----------------------------<  97  4.8   |  20<L>  |  1.07    Ca    9.4      22 Aug 2018 07:44            MICROBIOLOGY:  Vancomycin Level, Trough: 18.1 ug/mL (08-22-18 @ 07:44)    .Body Fluid Lung - Upper Lobe Right  08-17-18   Few Corynebacterium species  "Susceptibilities not performed"  --    polymorphonuclear leukocytes seen  No organisms seen  by cytocentrifuge      .Urine Clean Catch (Midstream)  08-15-18   No growth  --  --      .Blood Blood-Peripheral  08-14-18   No growth at 5 days.  --  --      .Abscess Arm - Right  08-14-18   No growth at 5 days  --  --      .Sputum Sputum  08-14-18   Normal Respiratory Heaven present  --    Rare polymorphonuclear leukocytes per low power field  Rare Squamous epithelial cells per low power field  Moderate Gram Negative Rods per oil power field        Toxoplasma IgG Screen: 4.7 IU/mL (06-14-18 @ 09:42)  HIV-1 RNA Quantitative, Viral Load Log: NOT DET. lg /mL (05-31-18 @ 19:10)  CMV IgG Antibody: 5.40 U/mL (05-25-18 @ 17:51)  CMV IgG Antibody: >10.00 U/mL (02-22-18 @ 23:45)  HIV-1 RNA Quantitative, Viral Load Log: NOT DET. lg /mL (02-02-18 @ 19:25)      Clostridium difficile GDH Toxins A&amp;B, EIA:   Positive (08-18-18 @ 13:44)  Clostridium difficile GDH Interpretation: Positive for toxigenic C. Difficile.  This specimen is positive for C.  Difficile glutamate dehydrogenase (GDH) antigen and positive for C.  Difficile Toxins A & B, by EIA.  GDH is a highly sensitive marker for C.  Difficile that is produced in largeamounts by all C. Difficile strains,  both toxigenic and nontoxigenic.  This assay has not been validated as a  test of cure.  The results of this assay should always be interpreted in  conjunction with patient's clinical history. (08-18-18 @ 13:44)      RADIOLOGY:  CXR- Consolidations within the right lower lobe are unchanged. Mildly   increased left retrocardiac opacity possibly atelectasis. No pneumothorax.    Small right pleural effusion, unchanged.

## 2018-08-23 LAB
ANION GAP SERPL CALC-SCNC: 10 MMOL/L — SIGNIFICANT CHANGE UP (ref 5–17)
BASOPHILS # BLD AUTO: 0 K/UL — SIGNIFICANT CHANGE UP (ref 0–0.2)
BASOPHILS NFR BLD AUTO: 0 % — SIGNIFICANT CHANGE UP (ref 0–2)
BUN SERPL-MCNC: 25 MG/DL — HIGH (ref 7–23)
CALCIUM SERPL-MCNC: 8.8 MG/DL — SIGNIFICANT CHANGE UP (ref 8.4–10.5)
CHLORIDE SERPL-SCNC: 107 MMOL/L — SIGNIFICANT CHANGE UP (ref 96–108)
CO2 SERPL-SCNC: 21 MMOL/L — LOW (ref 22–31)
CREAT SERPL-MCNC: 1.18 MG/DL — SIGNIFICANT CHANGE UP (ref 0.5–1.3)
EOSINOPHIL # BLD AUTO: 0.1 K/UL — SIGNIFICANT CHANGE UP (ref 0–0.5)
EOSINOPHIL NFR BLD AUTO: 2 % — SIGNIFICANT CHANGE UP (ref 0–6)
GLUCOSE BLDC GLUCOMTR-MCNC: 103 MG/DL — HIGH (ref 70–99)
GLUCOSE BLDC GLUCOMTR-MCNC: 108 MG/DL — HIGH (ref 70–99)
GLUCOSE BLDC GLUCOMTR-MCNC: 114 MG/DL — HIGH (ref 70–99)
GLUCOSE BLDC GLUCOMTR-MCNC: 114 MG/DL — HIGH (ref 70–99)
GLUCOSE SERPL-MCNC: 97 MG/DL — SIGNIFICANT CHANGE UP (ref 70–99)
HCT VFR BLD CALC: 25.5 % — LOW (ref 39–50)
HCV RNA SERPL NAA DL=5-ACNC: SIGNIFICANT CHANGE UP
HCV RNA SPEC NAA+PROBE-LOG IU: SIGNIFICANT CHANGE UP LOGIU/ML
HGB BLD-MCNC: 7.9 G/DL — LOW (ref 13–17)
LYMPHOCYTES # BLD AUTO: 0.7 K/UL — LOW (ref 1–3.3)
LYMPHOCYTES # BLD AUTO: 26 % — SIGNIFICANT CHANGE UP (ref 13–44)
MCHC RBC-ENTMCNC: 28.4 PG — SIGNIFICANT CHANGE UP (ref 27–34)
MCHC RBC-ENTMCNC: 30.8 GM/DL — LOW (ref 32–36)
MCV RBC AUTO: 92 FL — SIGNIFICANT CHANGE UP (ref 80–100)
METAMYELOCYTES # FLD: 1 % — HIGH (ref 0–0)
MONOCYTES # BLD AUTO: 1 K/UL — HIGH (ref 0–0.9)
MONOCYTES NFR BLD AUTO: 21 % — HIGH (ref 2–14)
MYELOCYTES NFR BLD: 2 % — HIGH (ref 0–0)
NEUTROPHILS # BLD AUTO: 1.4 K/UL — LOW (ref 1.8–7.4)
NEUTROPHILS NFR BLD AUTO: 45 % — SIGNIFICANT CHANGE UP (ref 43–77)
NEUTS BAND # BLD: 3 % — SIGNIFICANT CHANGE UP (ref 0–8)
PLAT MORPH BLD: NORMAL — SIGNIFICANT CHANGE UP
PLATELET # BLD AUTO: 572 K/UL — HIGH (ref 150–400)
POTASSIUM SERPL-MCNC: 4.6 MMOL/L — SIGNIFICANT CHANGE UP (ref 3.5–5.3)
POTASSIUM SERPL-SCNC: 4.6 MMOL/L — SIGNIFICANT CHANGE UP (ref 3.5–5.3)
RBC # BLD: 2.77 M/UL — LOW (ref 4.2–5.8)
RBC # FLD: 20 % — HIGH (ref 10.3–14.5)
RBC BLD AUTO: SIGNIFICANT CHANGE UP
SODIUM SERPL-SCNC: 138 MMOL/L — SIGNIFICANT CHANGE UP (ref 135–145)
TACROLIMUS SERPL-MCNC: 8 NG/ML — SIGNIFICANT CHANGE UP
WBC # BLD: 3.1 K/UL — LOW (ref 3.8–10.5)
WBC # FLD AUTO: 3.1 K/UL — LOW (ref 3.8–10.5)

## 2018-08-23 PROCEDURE — 90832 PSYTX W PT 30 MINUTES: CPT

## 2018-08-23 PROCEDURE — 99232 SBSQ HOSP IP/OBS MODERATE 35: CPT | Mod: GC

## 2018-08-23 PROCEDURE — 99232 SBSQ HOSP IP/OBS MODERATE 35: CPT

## 2018-08-23 PROCEDURE — 71045 X-RAY EXAM CHEST 1 VIEW: CPT | Mod: 26

## 2018-08-23 PROCEDURE — 99233 SBSQ HOSP IP/OBS HIGH 50: CPT | Mod: GC

## 2018-08-23 RX ADMIN — Medication 100 MILLIGRAM(S): at 15:17

## 2018-08-23 RX ADMIN — Medication 125 MILLIGRAM(S): at 11:58

## 2018-08-23 RX ADMIN — CEFEPIME 100 MILLIGRAM(S): 1 INJECTION, POWDER, FOR SOLUTION INTRAMUSCULAR; INTRAVENOUS at 21:34

## 2018-08-23 RX ADMIN — MAGNESIUM OXIDE 400 MG ORAL TABLET 400 MILLIGRAM(S): 241.3 TABLET ORAL at 08:03

## 2018-08-23 RX ADMIN — Medication 125 MILLIGRAM(S): at 23:35

## 2018-08-23 RX ADMIN — Medication 125 MILLIGRAM(S): at 17:25

## 2018-08-23 RX ADMIN — Medication 20 MILLIGRAM(S): at 21:30

## 2018-08-23 RX ADMIN — Medication 3 MILLIGRAM(S): at 08:03

## 2018-08-23 RX ADMIN — FAMOTIDINE 20 MILLIGRAM(S): 10 INJECTION INTRAVENOUS at 11:58

## 2018-08-23 RX ADMIN — Medication 100 MILLIGRAM(S): at 21:30

## 2018-08-23 RX ADMIN — Medication 1 TABLET(S): at 11:58

## 2018-08-23 RX ADMIN — ATOVAQUONE 1500 MILLIGRAM(S): 750 SUSPENSION ORAL at 11:58

## 2018-08-23 RX ADMIN — Medication 125 MILLIGRAM(S): at 05:35

## 2018-08-23 RX ADMIN — CEFEPIME 100 MILLIGRAM(S): 1 INJECTION, POWDER, FOR SOLUTION INTRAMUSCULAR; INTRAVENOUS at 05:41

## 2018-08-23 RX ADMIN — TACROLIMUS 1 MILLIGRAM(S): 5 CAPSULE ORAL at 20:15

## 2018-08-23 RX ADMIN — TACROLIMUS 0.5 MILLIGRAM(S): 5 CAPSULE ORAL at 08:03

## 2018-08-23 RX ADMIN — VORICONAZOLE 200 MILLIGRAM(S): 10 INJECTION, POWDER, LYOPHILIZED, FOR SOLUTION INTRAVENOUS at 17:25

## 2018-08-23 RX ADMIN — MAGNESIUM OXIDE 400 MG ORAL TABLET 400 MILLIGRAM(S): 241.3 TABLET ORAL at 17:25

## 2018-08-23 RX ADMIN — VORICONAZOLE 200 MILLIGRAM(S): 10 INJECTION, POWDER, LYOPHILIZED, FOR SOLUTION INTRAVENOUS at 05:35

## 2018-08-23 RX ADMIN — Medication 81 MILLIGRAM(S): at 11:58

## 2018-08-23 RX ADMIN — CEFEPIME 100 MILLIGRAM(S): 1 INJECTION, POWDER, FOR SOLUTION INTRAMUSCULAR; INTRAVENOUS at 15:18

## 2018-08-23 RX ADMIN — Medication 650 MILLIGRAM(S): at 17:25

## 2018-08-23 RX ADMIN — Medication 650 MILLIGRAM(S): at 05:35

## 2018-08-23 RX ADMIN — Medication 100 MILLIGRAM(S): at 05:35

## 2018-08-23 NOTE — PROGRESS NOTE ADULT - PROBLEM SELECTOR PLAN 1
Pt with h/o colonized carbapenem resistant pseudomonas   will cont vanc, voriconazole, and cefepime per ID   Plan for repeat CT scan on monday - with possible repeat biopsy pending those results  workup thus far negative - repeat aspergillus, fungitel and crypto negative   s/p lung tissue biopsy with IR on 8/18/2018 - Corynebacterium (likely contaminant)

## 2018-08-23 NOTE — PROGRESS NOTE ADULT - SUBJECTIVE AND OBJECTIVE BOX
Behavioral Cardiology Progress Note  History of present illness:  Mr. 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  Now presenting with fever, decreased PO intake, productive cough.    Mental Status Exam:  Seen sitting in chair. A&Ox3.  Well-related with good eye contact. Speech normal rate and volume. Thought process goal directed. No evidence of any psychosis, delusions, jona.  Denies s/i.  Mood "good." Affect full range. Insight and judgment adequate.

## 2018-08-23 NOTE — PROGRESS NOTE ADULT - ASSESSMENT
67 yo M s/p heart transplant complicated by rejection with rounds of plasmaphoresis , IVIG, rituximab x2, known colonizer with  Pseudomonas last treated for PNA in June 2018 with cefepime and voriconazole now presents with fever, decreased PO intake, productive cough. Pt had CT guided R lung mass bx on 8/17. Ddx include recurrent pneumonia vs gout flare  vs other infectious process.     Fungitell negative  Galactomannan negative  Crypto Ag negative  Quant indeterminate, however lung biopsy w negative AFB stain  Pathology report from 8/17 R lung bx showing inflammation w negative AFB and GMS stain  CT guided biopsy cultures are NGTD- only grew a corynebacterium that is likely not a pathogen    Recommend:   - CT chest on 8/14 suspicious for fungal infection, serologies have been negative and pt clinically improving; however would favor repeat CT Chest early next week- if infiltrates not improved/ worsening will likely need repeat lung biopsy for definitive dx  - f/u CMV PCR, Hep C PCR  - Continue the Cefepime and Voriconazole   - C.diff- diarrhea improving with oral Vanco/IV flagyl   - OI prophylaxis with Mepron; s/p 6 months of Valganciclovir    d/w primary team

## 2018-08-23 NOTE — PROGRESS NOTE ADULT - SUBJECTIVE AND OBJECTIVE BOX
VITAL SIGNS    Telemetry:      Vital Signs Last 24 Hrs  T(C): 36.5 (18 @ 04:09), Max: 36.8 (18 @ 13:14)  T(F): 97.7 (18 @ 04:09), Max: 98.2 (18 @ 13:14)  HR: 108 (18 @ 04:09) (108 - 116)  BP: 127/89 (18 @ 04:09) (104/81 - 127/89)  RR: 18 (18 @ 04:09) (18 - 18)  SpO2: 95% (18 @ 04:09) (95% - 96%)                   Daily     Daily Weight in k.4 (23 Aug 2018 08:01)        CAPILLARY BLOOD GLUCOSE      POCT Blood Glucose.: 108 mg/dL (23 Aug 2018 07:25)  POCT Blood Glucose.: 128 mg/dL (22 Aug 2018 21:26)  POCT Blood Glucose.: 103 mg/dL (22 Aug 2018 16:26)  POCT Blood Glucose.: 140 mg/dL (22 Aug 2018 11:55)                            PHYSICAL EXAM    Neurology: alert and oriented x 3, moves all extremities with no defecits  CV :  RRR  Sternal Wound :  CDI , Stable   healed  Lungs:   CTA B/L  Abdomen: soft, nontender, nondistended, positive bowel sounds,  Extremities: VITAL SIGNS    Telemetry:  sr   100    Vital Signs Last 24 Hrs  T(C): 36.5 (18 @ 04:09), Max: 36.8 (18 @ 13:14)  T(F): 97.7 (18 @ 04:09), Max: 98.2 (18 @ 13:14)  HR: 108 (18 @ 04:09) (108 - 116)  BP: 127/89 (18 @ 04:09) (104/81 - 127/89)  RR: 18 (18 @ 04:09) (18 - 18)  SpO2: 95% (18 @ 04:09) (95% - 96%)              Daily Weight in k.4 (23 Aug 2018 08:01)        CAPILLARY BLOOD GLUCOSE      POCT Blood Glucose.: 108 mg/dL (23 Aug 2018 07:25)  POCT Blood Glucose.: 128 mg/dL (22 Aug 2018 21:26)  POCT Blood Glucose.: 103 mg/dL (22 Aug 2018 16:26)  POCT Blood Glucose.: 140 mg/dL (22 Aug 2018 11:55)                            PHYSICAL EXAM    Neurology: alert and oriented x 3, moves all extremities with no defecits  CV :  RRR  Sternal Wound :  CDI , Stable   healed  Lungs:   CTA B/L  Abdomen: soft, nontender, nondistended, positive bowel sounds,  Extremities:       rt wrist with small amount of swelling   nontender,  no edema  b/l feet VITAL SIGNS    Telemetry:  sr   100    Vital Signs Last 24 Hrs  T(C): 36.5 (18 @ 04:09), Max: 36.8 (18 @ 13:14)  T(F): 97.7 (18 @ 04:09), Max: 98.2 (18 @ 13:14)  HR: 108 (18 @ 04:09) (108 - 116)  BP: 127/89 (18 @ 04:09) (104/81 - 127/89)  RR: 18 (18 @ 04:09) (18 - 18)  SpO2: 95% (18 @ 04:09) (95% - 96%)              Daily Weight in k.4 (23 Aug 2018 08:01)        CAPILLARY BLOOD GLUCOSE      POCT Blood Glucose.: 108 mg/dL (23 Aug 2018 07:25)  POCT Blood Glucose.: 128 mg/dL (22 Aug 2018 21:26)  POCT Blood Glucose.: 103 mg/dL (22 Aug 2018 16:26)  POCT Blood Glucose.: 140 mg/dL (22 Aug 2018 11:55)                            PHYSICAL EXAM  s    " I feel well,   some SOB   ,  no chest pain"  Neurology: alert and oriented x 3, moves all extremities with no defecits  CV :  RRR  Sternal Wound :  CDI , Stable   healed  Lungs:   CTA B/L  Abdomen: soft, nontender, nondistended, positive bowel sounds,  Extremities:       rt wrist with small amount of swelling   nontender,  no edema  b/l feet

## 2018-08-23 NOTE — PROGRESS NOTE ADULT - SUBJECTIVE AND OBJECTIVE BOX
Date of Admission:    24H hour events:     MEDICATIONS:  aspirin enteric coated 81 milliGRAM(s) Oral daily    atovaquone Suspension 1500 milliGRAM(s) Oral daily  cefepime   IVPB      cefepime   IVPB 1000 milliGRAM(s) IV Intermittent every 8 hours  metroNIDAZOLE  IVPB      metroNIDAZOLE  IVPB 500 milliGRAM(s) IV Intermittent every 8 hours  vancomycin    Solution 125 milliGRAM(s) Oral every 6 hours  voriconazole 200 milliGRAM(s) Oral every 12 hours      acetaminophen    Suspension 500 milliGRAM(s) Oral every 6 hours PRN    docusate sodium 100 milliGRAM(s) Oral two times a day PRN  famotidine    Tablet 20 milliGRAM(s) Oral daily    insulin lispro (HumaLOG) corrective regimen sliding scale   SubCutaneous three times a day before meals  insulin lispro (HumaLOG) corrective regimen sliding scale   SubCutaneous at bedtime  pravastatin 20 milliGRAM(s) Oral at bedtime  predniSONE   Tablet 3 milliGRAM(s) Oral <User Schedule>    magnesium oxide 400 milliGRAM(s) Oral two times a day with meals  multivitamin 1 Tablet(s) Oral daily  sodium bicarbonate 650 milliGRAM(s) Oral two times a day  sodium chloride 0.65% Nasal 1 Spray(s) Both Nostrils three times a day  tacrolimus 0.5 milliGRAM(s) Oral <User Schedule>  tacrolimus 1 milliGRAM(s) Oral <User Schedule>      REVIEW OF SYSTEMS:  General: no fatigue/malaise, weight loss/gain.  Skin: no rashes.  Ophthalmologic: no blurred vision, no loss of vision. 	  ENT: no sore throat, rhinorrhea, sinus congestion.  Respiratory: no SOB, cough or wheeze.  Gastrointestinal:  no N/V/D, no melena/hematemesis/hematochezia.  Genitourinary: no dysuria/hesitancy or hematuria.  Musculoskeletal: no myalgias or arthralgias.  Neurological: no changes in vision or hearing, no lightheadedness/dizziness, no syncope/near syncope	  Psychiatric: no unusual stress/anxiety.   Hematology/Lymphatics: no unusual bleeding, bruising and no lymphadenopathy.  Endocrine: no unusual thirst.   All others negative except as stated above and in HPI.    PHYSICAL EXAM:  T(C): 36.5 (08-23-18 @ 04:09), Max: 36.8 (08-22-18 @ 13:14)  HR: 108 (08-23-18 @ 04:09) (108 - 116)  BP: 127/89 (08-23-18 @ 04:09) (104/81 - 127/89)  RR: 18 (08-23-18 @ 04:09) (18 - 18)  SpO2: 95% (08-23-18 @ 04:09) (95% - 96%)  Wt(kg): --  I&O's Summary    22 Aug 2018 07:01  -  23 Aug 2018 07:00  --------------------------------------------------------  IN: 736 mL / OUT: 1200 mL / NET: -464 mL        Appearance: Normal	  HEENT:   Normal oral mucosa, PERRL, EOMI	  Lymphatic: No lymphadenopathy  Cardiovascular: Normal S1 S2, No JVD, No murmurs, No edema  Respiratory: Lungs clear to auscultation	  Psychiatry: A & O x 3, Mood & affect appropriate  Gastrointestinal:  Soft, Non-tender, + BS	  Skin: No rashes, No ecchymoses, No cyanosis	  Neurologic: Non-focal  Extremities: Normal range of motion, No clubbing, cyanosis or edema  Vascular: Peripheral pulses palpable 2+ bilaterally        LABS:	 	    CBC Full  -  ( 23 Aug 2018 07:47 )  WBC Count : 3.1 K/uL  Hemoglobin : 7.9 g/dL  Hematocrit : 25.5 %  Platelet Count - Automated : 572 K/uL  Mean Cell Volume : 92.0 fl  Mean Cell Hemoglobin : 28.4 pg  Mean Cell Hemoglobin Concentration : 30.8 gm/dL  Auto Neutrophil # : x  Auto Lymphocyte # : x  Auto Monocyte # : x  Auto Eosinophil # : x  Auto Basophil # : x  Auto Neutrophil % : x  Auto Lymphocyte % : x  Auto Monocyte % : x  Auto Eosinophil % : x  Auto Basophil % : x    08-23    138  |  107  |  25<H>  ----------------------------<  97  4.6   |  21<L>  |  1.18  08-22    138  |  107  |  23  ----------------------------<  97  4.8   |  20<L>  |  1.07    Ca    8.8      23 Aug 2018 07:47  Ca    9.4      22 Aug 2018 07:44        proBNP:   Lipid Profile:   HgA1c:   TSH:       CARDIAC MARKERS:            TELEMETRY: 	    ECG:  	  RADIOLOGY:  OTHER: 	    PREVIOUS DIAGNOSTIC TESTING:    [ ] Echocardiogram:  [ ]  Catheterization:  [ ] Stress Test:  	  	  ASSESSMENT/PLAN: 	    Rayna Andersen Pt laying in bed. Denies SOB. cough continues to improve, as well as bowel movements (he had 2 this am). He continues to have some R lower chest discomfort.     MEDICATIONS:  aspirin enteric coated 81 milliGRAM(s) Oral daily    atovaquone Suspension 1500 milliGRAM(s) Oral daily  cefepime   IVPB      cefepime   IVPB 1000 milliGRAM(s) IV Intermittent every 8 hours  metroNIDAZOLE  IVPB      metroNIDAZOLE  IVPB 500 milliGRAM(s) IV Intermittent every 8 hours  vancomycin    Solution 125 milliGRAM(s) Oral every 6 hours  voriconazole 200 milliGRAM(s) Oral every 12 hours      acetaminophen    Suspension 500 milliGRAM(s) Oral every 6 hours PRN    docusate sodium 100 milliGRAM(s) Oral two times a day PRN  famotidine    Tablet 20 milliGRAM(s) Oral daily    insulin lispro (HumaLOG) corrective regimen sliding scale   SubCutaneous three times a day before meals  insulin lispro (HumaLOG) corrective regimen sliding scale   SubCutaneous at bedtime  pravastatin 20 milliGRAM(s) Oral at bedtime  predniSONE   Tablet 3 milliGRAM(s) Oral <User Schedule>    magnesium oxide 400 milliGRAM(s) Oral two times a day with meals  multivitamin 1 Tablet(s) Oral daily  sodium bicarbonate 650 milliGRAM(s) Oral two times a day  sodium chloride 0.65% Nasal 1 Spray(s) Both Nostrils three times a day  tacrolimus 0.5 milliGRAM(s) Oral <User Schedule>  tacrolimus 1 milliGRAM(s) Oral <User Schedule>    PHYSICAL EXAM:  T(C): 36.5 (08-23-18 @ 04:09), Max: 36.8 (08-22-18 @ 13:14)  HR: 108 (08-23-18 @ 04:09) (108 - 116)  BP: 127/89 (08-23-18 @ 04:09) (104/81 - 127/89)  RR: 18 (08-23-18 @ 04:09) (18 - 18)  SpO2: 95% (08-23-18 @ 04:09) (95% - 96%)  Wt(kg): --  I&O's Summary    22 Aug 2018 07:01  -  23 Aug 2018 07:00  --------------------------------------------------------  IN: 736 mL / OUT: 1200 mL / NET: -464 mL    Appearance: Normal	  HEENT:   Normal oral mucosa, PERRL, EOMI	  Lymphatic: No lymphadenopathy  Cardiovascular: Normal S1 S2, JVP 6, No murmurs, No edema  Respiratory: Lungs clear to auscultation	  Psychiatry: A & O x 3, Mood & affect appropriate  Gastrointestinal:  Soft, Non-tender, + BS	  Skin: No rashes, No ecchymoses  R 3rd digit with dry gangrene   Neurologic: Non-focal  Extremities: Normal range of motion, No clubbing, cyanosis or edema  Vascular: Peripheral pulses palpable  bilaterally    LABS:	 	    CBC Full  -  ( 23 Aug 2018 07:47 )  WBC Count : 3.1 K/uL  Hemoglobin : 7.9 g/dL  Hematocrit : 25.5 %  Platelet Count - Automated : 572 K/uL  Mean Cell Volume : 92.0 fl  Mean Cell Hemoglobin : 28.4 pg  Mean Cell Hemoglobin Concentration : 30.8 gm/dL  Auto Neutrophil # : x  Auto Lymphocyte # : x  Auto Monocyte # : x  Auto Eosinophil # : x  Auto Basophil # : x  Auto Neutrophil % : x  Auto Lymphocyte % : x  Auto Monocyte % : x  Auto Eosinophil % : x  Auto Basophil % : x    08-23    138  |  107  |  25<H>  ----------------------------<  97  4.6   |  21<L>  |  1.18  08-22    138  |  107  |  23  ----------------------------<  97  4.8   |  20<L>  |  1.07    Ca    8.8      23 Aug 2018 07:47  Ca    9.4      22 Aug 2018 07:44

## 2018-08-23 NOTE — PROGRESS NOTE ADULT - PROBLEM SELECTOR PLAN 2
check daily tacrolimus level   Continue tacro     pt off cellcept due to hx of fungal infection - will cont hold   Continue ASA 81 and  Pravastatin 20 mg for CAD  Rheum consult placed for Rt upper extremity joints aches and swelling

## 2018-08-23 NOTE — PROGRESS NOTE ADULT - ASSESSMENT
Slept well last night.  Appetite good. Reports he is coping well with hospitalization and understands treatment plan.  Denies anxiety/depression.  At times feels lonely in hospital setting but overall mood has been “good.”  Receptive to support and validation.  Coping strategies reviewed.   Dx:  r/o Adjustment disorder; F43.20         Recommendations:    Behavioral Cardiology will follow as needed

## 2018-08-23 NOTE — PROGRESS NOTE ADULT - SUBJECTIVE AND OBJECTIVE BOX
Follow Up:  Overnight, no events. Pt still c/o diarrhea but not able to state how many episodes.      ROS:      All other systems negative    Constitutional: no fever, no chills  Head: no trauma  Eyes: no vision changes, no eye pain  ENT:  no sore throat, no rhinorrhea  Cardiovascular:  no chest pain, no palpitation  Respiratory:  no SOB, no cough  GI:  no abd pain, no vomiting, +diarrhea  urinary: no dysuria, no hematuria, no flank pain  musculoskeletal:  no joint pain, no joint swelling  skin:  no rash  neurology:  no headache, no seizure, no change in mental status  psych: no anxiety, no depression         Allergies  No Known Allergies        ANTIMICROBIALS:  atovaquone Suspension 1500 daily  cefepime   IVPB    cefepime   IVPB 1000 every 8 hours  metroNIDAZOLE  IVPB    metroNIDAZOLE  IVPB 500 every 8 hours  vancomycin    Solution 125 every 6 hours  voriconazole 200 every 12 hours      OTHER MEDS:  acetaminophen    Suspension 500 milliGRAM(s) Oral every 6 hours PRN  aspirin enteric coated 81 milliGRAM(s) Oral daily  Calcium Citrate + Vit D 315mg/250 IU 2 Tablet(s) 2 Tablet(s) Oral <User Schedule>  docusate sodium 100 milliGRAM(s) Oral two times a day PRN  famotidine    Tablet 20 milliGRAM(s) Oral daily  insulin lispro (HumaLOG) corrective regimen sliding scale   SubCutaneous three times a day before meals  insulin lispro (HumaLOG) corrective regimen sliding scale   SubCutaneous at bedtime  magnesium oxide 400 milliGRAM(s) Oral two times a day with meals  multivitamin 1 Tablet(s) Oral daily  pravastatin 20 milliGRAM(s) Oral at bedtime  predniSONE   Tablet 3 milliGRAM(s) Oral <User Schedule>  sodium bicarbonate 650 milliGRAM(s) Oral two times a day  sodium chloride 0.65% Nasal 1 Spray(s) Both Nostrils three times a day  tacrolimus 0.5 milliGRAM(s) Oral <User Schedule>  tacrolimus 1 milliGRAM(s) Oral <User Schedule>      Vital Signs Last 24 Hrs  T(C): 36.6 (23 Aug 2018 13:37), Max: 36.8 (22 Aug 2018 20:18)  T(F): 97.9 (23 Aug 2018 13:37), Max: 98.2 (22 Aug 2018 20:18)  HR: 110 (23 Aug 2018 13:37) (108 - 116)  BP: 108/74 (23 Aug 2018 13:37) (108/74 - 127/89)  BP(mean): --  RR: 18 (23 Aug 2018 13:37) (18 - 18)  SpO2: 95% (23 Aug 2018 13:37) (95% - 96%)    Physical Exam:  General:    NAD,  non toxic, A&O x 3  Head: atraumatic, normocephalic  Eye: normal sclera and conjunctiva  ENT:    no oropharyngeal lesions,   no LAD,   neck supple  Cardio:     regular S1, S2  Respiratory:    clear b/l, no wheezing  abd: soft, BS +, no tenderness,    no organomegaly  :   no CVAT,  no suprapubic tenderness  Musculoskeletal:   no joint swelling,   no edema  vascular: no lines, normal pulses  Skin:    no rash  Neurologic:     no focal deficit  psych: normal affect, no suicidal ideation                          7.9    3.1   )-----------( 572      ( 23 Aug 2018 07:47 )             25.5       08-23    138  |  107  |  25<H>  ----------------------------<  97  4.6   |  21<L>  |  1.18    Ca    8.8      23 Aug 2018 07:47            MICROBIOLOGY:  v  .Body Fluid Lung - Upper Lobe Right  08-17-18   Few Corynebacterium species  "Susceptibilities not performed"  --    polymorphonuclear leukocytes seen  No organisms seen  by cytocentrifuge      .Urine Clean Catch (Midstream)  08-15-18   No growth  --  --      .Blood Blood-Peripheral  08-14-18   No growth at 5 days.  --  --      .Abscess Arm - Right  08-14-18   No growth at 5 days  --  --      .Sputum Sputum  08-14-18   Normal Respiratory Heaven present  --    Rare polymorphonuclear leukocytes per low power field  Rare Squamous epithelial cells per low power field  Moderate Gram Negative Rods per oil power field      Toxoplasma IgG Screen: 4.7 IU/mL (06-14-18 @ 09:42)  HIV-1 RNA Quantitative, Viral Load Log: NOT DET. lg /mL (05-31-18 @ 19:10)  CMV IgG Antibody: 5.40 U/mL (05-25-18 @ 17:51)  CMV IgG Antibody: >10.00 U/mL (02-22-18 @ 23:45)  HIV-1 RNA Quantitative, Viral Load Log: NOT DET. lg /mL (02-02-18 @ 19:25)      Clostridium difficile GDH Toxins A&amp;B, EIA:   Positive (08-18-18 @ 13:44)  Clostridium difficile GDH Interpretation: Positive for toxigenic C. Difficile.  This specimen is positive for C.  Difficile glutamate dehydrogenase (GDH) antigen and positive for C.  Difficile Toxins A & B, by EIA.  GDH is a highly sensitive marker for C.  Difficile that is produced in largeamounts by all C. Difficile strains,  both toxigenic and nontoxigenic.  This assay has not been validated as a  test of cure.  The results of this assay should always be interpreted in  conjunction with patient's clinical history. (08-18-18 @ 13:44)      RADIOLOGY:  CXR- Unchanged right basilar opacities and pleural effusion. No pneumothorax.

## 2018-08-23 NOTE — PROGRESS NOTE ADULT - PROBLEM SELECTOR PLAN 2
Tacro therapeutic  today at 8, goal 8-10  will cont 0.5/1  cont prednisone  3mg qd   pt off cellcept due to hx of fungal infection and now with leukopenia- will cont hold   Pt intermediate risk CMV (D-/R+) - holding valcyte given leukopenia - repeat CMV PCR is pending   Intermediate risk for toxo (D-/R+) - on mepron 1500   Hep C + donor s/p course of Mayvret with undetectable viral load    CAV: asa/pravastatin

## 2018-08-23 NOTE — PROGRESS NOTE ADULT - ASSESSMENT
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 Also complains of decreased PO intake for the last few days.  Denies nausea, vomiting or diarrhea. Of note right wrist is slightly tender, warm to touch w/ fluctuance, has had wrist swelling on previous admission with negative workup. Temp in clinic was 102 - most likely secondary to recurrent PNA based on new productive cough x 3 days and RUL infiltrate on CT scan.  8/14 Called by lab> arthrocentesis fluid sent for cell count> state not enough fluid for count  8/15 Still febrile, Tmax 101.8. Sputum Cx with mod gram neg rods. Unable to broaden cefepime- pt with hx of colonized carbapenem pseudomonas per ID. IR consulted to evaluate for transthoracic bx of R lung  8/16 CT guided biopsy of Right lung scheduled for Friday with Dr. Alfred, continued on IV antibiotics. VSS, Pt afebrile Tmax 98.4. Tacro level 11.5  8/17 awaiting Rt Lung bx, afebrile tacro level 10.6 valcyte decreased to every other day to start 8/19.   8/18 VVS; Right lung bx: Gram Stain: polymorphonuclear leukocytes seen. No organisms seen. Prednisone decreased to 3 mg PO daily as per CHF 2/2 infectious process. Continue with current medication regimen.   8/19 vvs: awaiting final results of Cardiac biopsy.  Tacro level 7.7 today   8/20 Pt is feeling better today, seen OOB in chair. Cardiac biopsy results still pending.  tacro level 8.0 today.  pt is afebrile but remains leukopenic. WBC 2.7   8/21 VVS; (+) loose stools. (+) Cdif --> Continue on PO vanco and flagyl IV per ID. Continue with current medication regimen. Start Nepro 1 can daily.  Check CMV PCR level in am. Awaiting lung bx cultures.   8/22    vss    awaiting  lab results including cmv   and rt wrist tap 67 y/o male with PMH NICM HFrEF s/p HM2 LVAD 6/2017, who underwent heart transplant on 2/23/18 (CMV D-/R+ and Toxo D-/R+, Hep C+ heart) with post op graft dysfunction who underwent plasmapheresis and IVIG x2 as well as rituximab, with suspected fungal PNA diagnosed 6/2018 on voriconazole, who presented to clinic today after routine blood work, stating he is not feeling well this morning, feels weak, no chills, no reported fever, denies chest pain or sob. Also complains of decreased PO intake for the last few days.  Denies nausea, vomiting or diarrhea. Of note right wrist is slightly tender, warm to touch w/ fluctuance, has had wrist swelling on previous admission with negative workup. Temp in clinic was 102 - most likely secondary to recurrent PNA based on new productive cough x 3 days and RUL infiltrate on CT scan.  8/14 Called by lab> arthrocentesis fluid sent for cell count> state not enough fluid for count  8/15 Still febrile, Tmax 101.8. Sputum Cx with mod gram neg rods. Unable to broaden cefepime- pt with hx of colonized carbapenem pseudomonas per ID. IR consulted to evaluate for transthoracic bx of R lung  8/16 CT guided biopsy of Right lung scheduled for Friday with Dr. Alfred, continued on IV antibiotics. VSS, Pt afebrile Tmax 98.4. Tacro level 11.5  8/17 awaiting Rt Lung bx, afebrile tacro level 10.6 valcyte decreased to every other day to start 8/19.   8/18 VVS; Right lung bx: Gram Stain: polymorphonuclear leukocytes seen. No organisms seen. Prednisone decreased to 3 mg PO daily as per CHF 2/2 infectious process. Continue with current medication regimen.   8/19 vvs: awaiting final results of Cardiac biopsy.  Tacro level 7.7 today   8/20 Pt is feeling better today, seen OOB in chair. Cardiac biopsy results still pending.  tacro level 8.0 today.  pt is afebrile but remains leukopenic. WBC 2.7   8/21 VVS; (+) loose stools. (+) Cdif --> Continue on PO vanco and flagyl IV per ID. Continue with current medication regimen. Start Nepro 1 can daily.  Check CMV PCR level in am. Awaiting lung bx cultures.   8/22    vss    awaiting  lab results including cmv   and rt wrist tap,   fungal cx pending  from rll infiltrate

## 2018-08-24 LAB
ANION GAP SERPL CALC-SCNC: 12 MMOL/L — SIGNIFICANT CHANGE UP (ref 5–17)
BUN SERPL-MCNC: 24 MG/DL — HIGH (ref 7–23)
CALCIUM SERPL-MCNC: 9.6 MG/DL — SIGNIFICANT CHANGE UP (ref 8.4–10.5)
CHLORIDE SERPL-SCNC: 105 MMOL/L — SIGNIFICANT CHANGE UP (ref 96–108)
CMV DNA CSF QL NAA+PROBE: SIGNIFICANT CHANGE UP
CMV DNA SPEC NAA+PROBE-LOG#: SIGNIFICANT CHANGE UP LOGIU/ML
CO2 SERPL-SCNC: 22 MMOL/L — SIGNIFICANT CHANGE UP (ref 22–31)
CREAT SERPL-MCNC: 1.25 MG/DL — SIGNIFICANT CHANGE UP (ref 0.5–1.3)
GLUCOSE BLDC GLUCOMTR-MCNC: 106 MG/DL — HIGH (ref 70–99)
GLUCOSE BLDC GLUCOMTR-MCNC: 110 MG/DL — HIGH (ref 70–99)
GLUCOSE BLDC GLUCOMTR-MCNC: 121 MG/DL — HIGH (ref 70–99)
GLUCOSE BLDC GLUCOMTR-MCNC: 132 MG/DL — HIGH (ref 70–99)
GLUCOSE SERPL-MCNC: 90 MG/DL — SIGNIFICANT CHANGE UP (ref 70–99)
HCT VFR BLD CALC: 28.8 % — LOW (ref 39–50)
HGB BLD-MCNC: 8.6 G/DL — LOW (ref 13–17)
MCHC RBC-ENTMCNC: 27.9 PG — SIGNIFICANT CHANGE UP (ref 27–34)
MCHC RBC-ENTMCNC: 30 GM/DL — LOW (ref 32–36)
MCV RBC AUTO: 93 FL — SIGNIFICANT CHANGE UP (ref 80–100)
PLATELET # BLD AUTO: 625 K/UL — HIGH (ref 150–400)
POTASSIUM SERPL-MCNC: 5 MMOL/L — SIGNIFICANT CHANGE UP (ref 3.5–5.3)
POTASSIUM SERPL-SCNC: 5 MMOL/L — SIGNIFICANT CHANGE UP (ref 3.5–5.3)
RBC # BLD: 3.09 M/UL — LOW (ref 4.2–5.8)
RBC # FLD: 20.6 % — HIGH (ref 10.3–14.5)
SODIUM SERPL-SCNC: 139 MMOL/L — SIGNIFICANT CHANGE UP (ref 135–145)
TACROLIMUS SERPL-MCNC: 7.7 NG/ML — SIGNIFICANT CHANGE UP
WBC # BLD: 4.1 K/UL — SIGNIFICANT CHANGE UP (ref 3.8–10.5)
WBC # FLD AUTO: 4.1 K/UL — SIGNIFICANT CHANGE UP (ref 3.8–10.5)

## 2018-08-24 PROCEDURE — 99232 SBSQ HOSP IP/OBS MODERATE 35: CPT

## 2018-08-24 PROCEDURE — 99233 SBSQ HOSP IP/OBS HIGH 50: CPT | Mod: GC

## 2018-08-24 RX ADMIN — VORICONAZOLE 200 MILLIGRAM(S): 10 INJECTION, POWDER, LYOPHILIZED, FOR SOLUTION INTRAVENOUS at 06:56

## 2018-08-24 RX ADMIN — Medication 125 MILLIGRAM(S): at 06:57

## 2018-08-24 RX ADMIN — Medication 100 MILLIGRAM(S): at 14:06

## 2018-08-24 RX ADMIN — MAGNESIUM OXIDE 400 MG ORAL TABLET 400 MILLIGRAM(S): 241.3 TABLET ORAL at 08:10

## 2018-08-24 RX ADMIN — Medication 81 MILLIGRAM(S): at 14:05

## 2018-08-24 RX ADMIN — TACROLIMUS 0.5 MILLIGRAM(S): 5 CAPSULE ORAL at 08:11

## 2018-08-24 RX ADMIN — CEFEPIME 100 MILLIGRAM(S): 1 INJECTION, POWDER, FOR SOLUTION INTRAMUSCULAR; INTRAVENOUS at 14:14

## 2018-08-24 RX ADMIN — MAGNESIUM OXIDE 400 MG ORAL TABLET 400 MILLIGRAM(S): 241.3 TABLET ORAL at 17:38

## 2018-08-24 RX ADMIN — Medication 125 MILLIGRAM(S): at 17:38

## 2018-08-24 RX ADMIN — ATOVAQUONE 1500 MILLIGRAM(S): 750 SUSPENSION ORAL at 14:05

## 2018-08-24 RX ADMIN — Medication 20 MILLIGRAM(S): at 22:45

## 2018-08-24 RX ADMIN — Medication 125 MILLIGRAM(S): at 14:06

## 2018-08-24 RX ADMIN — Medication 3 MILLIGRAM(S): at 08:10

## 2018-08-24 RX ADMIN — CEFEPIME 100 MILLIGRAM(S): 1 INJECTION, POWDER, FOR SOLUTION INTRAMUSCULAR; INTRAVENOUS at 22:15

## 2018-08-24 RX ADMIN — Medication 100 MILLIGRAM(S): at 06:56

## 2018-08-24 RX ADMIN — Medication 125 MILLIGRAM(S): at 23:39

## 2018-08-24 RX ADMIN — Medication 100 MILLIGRAM(S): at 22:15

## 2018-08-24 RX ADMIN — Medication 650 MILLIGRAM(S): at 17:38

## 2018-08-24 RX ADMIN — TACROLIMUS 1 MILLIGRAM(S): 5 CAPSULE ORAL at 19:58

## 2018-08-24 RX ADMIN — Medication 1 TABLET(S): at 14:06

## 2018-08-24 RX ADMIN — VORICONAZOLE 200 MILLIGRAM(S): 10 INJECTION, POWDER, LYOPHILIZED, FOR SOLUTION INTRAVENOUS at 17:38

## 2018-08-24 RX ADMIN — CEFEPIME 100 MILLIGRAM(S): 1 INJECTION, POWDER, FOR SOLUTION INTRAMUSCULAR; INTRAVENOUS at 06:56

## 2018-08-24 RX ADMIN — Medication 650 MILLIGRAM(S): at 06:57

## 2018-08-24 RX ADMIN — FAMOTIDINE 20 MILLIGRAM(S): 10 INJECTION INTRAVENOUS at 14:06

## 2018-08-24 NOTE — PROGRESS NOTE ADULT - SUBJECTIVE AND OBJECTIVE BOX
Denies CP, SOB. Diarrhea and cough improving.     MEDICATIONS:  aspirin enteric coated 81 milliGRAM(s) Oral daily    atovaquone Suspension 1500 milliGRAM(s) Oral daily  cefepime   IVPB      cefepime   IVPB 1000 milliGRAM(s) IV Intermittent every 8 hours  metroNIDAZOLE  IVPB      metroNIDAZOLE  IVPB 500 milliGRAM(s) IV Intermittent every 8 hours  vancomycin    Solution 125 milliGRAM(s) Oral every 6 hours  voriconazole 200 milliGRAM(s) Oral every 12 hours      acetaminophen    Suspension 500 milliGRAM(s) Oral every 6 hours PRN    docusate sodium 100 milliGRAM(s) Oral two times a day PRN  famotidine    Tablet 20 milliGRAM(s) Oral daily    insulin lispro (HumaLOG) corrective regimen sliding scale   SubCutaneous three times a day before meals  insulin lispro (HumaLOG) corrective regimen sliding scale   SubCutaneous at bedtime  pravastatin 20 milliGRAM(s) Oral at bedtime  predniSONE   Tablet 3 milliGRAM(s) Oral <User Schedule>    magnesium oxide 400 milliGRAM(s) Oral two times a day with meals  multivitamin 1 Tablet(s) Oral daily  sodium bicarbonate 650 milliGRAM(s) Oral two times a day  sodium chloride 0.65% Nasal 1 Spray(s) Both Nostrils three times a day  tacrolimus 0.5 milliGRAM(s) Oral <User Schedule>  tacrolimus 1 milliGRAM(s) Oral <User Schedule>    PHYSICAL EXAM:  T(C): 36.8 (08-24-18 @ 04:52), Max: 36.9 (08-23-18 @ 20:30)  HR: 96 (08-24-18 @ 04:52) (96 - 110)  BP: 112/72 (08-24-18 @ 04:52) (107/80 - 112/72)  RR: 18 (08-24-18 @ 04:52) (18 - 18)  SpO2: 96% (08-24-18 @ 04:52) (95% - 96%)  Wt(kg): --  I&O's Summary    23 Aug 2018 07:01  -  24 Aug 2018 07:00  --------------------------------------------------------  IN: 930 mL / OUT: 1350 mL / NET: -420 mL    Appearance: Normal	  HEENT:   Normal oral mucosa, PERRL, EOMI	  Lymphatic: No lymphadenopathy  Cardiovascular: Normal S1 S2, JVP 6, No murmurs, No edema  Respiratory: Lungs clear to auscultation	  Psychiatry: A & O x 3, Mood & affect appropriate  Gastrointestinal:  Soft, Non-tender, + BS	  Skin: No rashes, No ecchymoses  R 3rd digit with dry gangrene   Neurologic: Non-focal  Extremities: Normal range of motion, No clubbing, cyanosis or edema  Vascular: Peripheral pulses palpable  bilaterally      LABS:	 	    CBC Full  -  ( 24 Aug 2018 07:17 )  WBC Count : 4.1 K/uL  Hemoglobin : 8.6 g/dL  Hematocrit : 28.8 %  Platelet Count - Automated : 625 K/uL  Mean Cell Volume : 93.0 fl  Mean Cell Hemoglobin : 27.9 pg  Mean Cell Hemoglobin Concentration : 30.0 gm/dL  Auto Neutrophil # : x  Auto Lymphocyte # : x  Auto Monocyte # : x  Auto Eosinophil # : x  Auto Basophil # : x  Auto Neutrophil % : x  Auto Lymphocyte % : x  Auto Monocyte % : x  Auto Eosinophil % : x  Auto Basophil % : x    08-24    139  |  105  |  24<H>  ----------------------------<  90  5.0   |  22  |  1.25  08-23    138  |  107  |  25<H>  ----------------------------<  97  4.6   |  21<L>  |  1.18    Ca    9.6      24 Aug 2018 07:17  Ca    8.8      23 Aug 2018 07:47

## 2018-08-24 NOTE — CHART NOTE - NSCHARTNOTEFT_GEN_A_CORE
Source: Patient [ X]    Family [ ]     other [ X] RN, Medical record     Pt seen for nutritional follow up. Pt seen drinking Nepro, reports enjoying it. Pt states he disliked lunch today, RD updated food preferences. Pt with multiple questions regarding food preferences sates there isn't anything for him to eat. RD attempted to review food safety, heart healthy eating however Pt closed eyes and ended RD interview and education session. Pt has been provided food safety, heart healthy and DM education materials. Pt aware RD remains available to monitor PO intake and education review    Chart reviewed, events noted. Pt admitted for Fevers, Pt with PMHx of Heart Transplant. Per chart Pt with SIRS, PNA and pseudomonas. S/p lung Bx, awaiting results/; Pt now C-Diff+    Diet : Consistent CHO, Nepro x1       Patient reports no GI Distress      PO intake:  %   Source for PO intake [ X] Patient [ ] family [ ] chart [ ] staff [ ] other        Current Weight: 71kg, decrease noted, ? related to fluids   Admission Wt: 78kg,   % Weight Change    Pertinent Medications: MEDICATIONS  (STANDING):  aspirin enteric coated 81 milliGRAM(s) Oral daily  atovaquone Suspension 1500 milliGRAM(s) Oral daily  Calcium Citrate + Vit D 315mg/250 IU 2 Tablet(s) 2 Tablet(s) Oral <User Schedule>  cefepime   IVPB      cefepime   IVPB 1000 milliGRAM(s) IV Intermittent every 8 hours  famotidine    Tablet 20 milliGRAM(s) Oral daily  insulin lispro (HumaLOG) corrective regimen sliding scale   SubCutaneous three times a day before meals  insulin lispro (HumaLOG) corrective regimen sliding scale   SubCutaneous at bedtime  magnesium oxide 400 milliGRAM(s) Oral two times a day with meals  metroNIDAZOLE  IVPB      metroNIDAZOLE  IVPB 500 milliGRAM(s) IV Intermittent every 8 hours  multivitamin 1 Tablet(s) Oral daily  pravastatin 20 milliGRAM(s) Oral at bedtime  predniSONE   Tablet 3 milliGRAM(s) Oral <User Schedule>  sodium bicarbonate 650 milliGRAM(s) Oral two times a day  sodium chloride 0.65% Nasal 1 Spray(s) Both Nostrils three times a day  tacrolimus 0.5 milliGRAM(s) Oral <User Schedule>  tacrolimus 1 milliGRAM(s) Oral <User Schedule>  vancomycin    Solution 125 milliGRAM(s) Oral every 6 hours  voriconazole 200 milliGRAM(s) Oral every 12 hours    MEDICATIONS  (PRN):  acetaminophen    Suspension 500 milliGRAM(s) Oral every 6 hours PRN For Temp greater than 38.5 C (101.3 F)  docusate sodium 100 milliGRAM(s) Oral two times a day PRN Constipation    Pertinent Labs:  Hgb/Hct:8.6/28.8-low, Na:139, K:5.0, Cl:105, BUN:24-high, Cr:1.25, Glucose:90, Ca:9.6, BG levels: 8/23: 103-114, 8/14: 106-110      intact     Estimated Needs:   [ X] no change since previous assessment  [ ] recalculated:       Previous Nutrition Diagnosis:   [X ]Inadequate Oral Intake        Nutrition Diagnosis is [ X] ongoing  [ ] resolved [ ] not applicable          New Nutrition Diagnosis: [X] not applicable       Interventions:     Recommend    1. Provide food preferences as requested by Pt/family within diet restrictions  2. Encourage PO intake during meal times 3. Reviewed menu ordering procedures   4. Continue Nepro x1  5. Continue education reinforcement   6. Trend BG levels, renal indices, LFT's and electrolytes      Monitoring and Evaluation:     [X] PO intake [X ] Tolerance to diet prescription [X ] weights [x ] follow up per protocol    [X ] other: RD remains available Sarah Siegler RD, [facility] Pager #[phone]

## 2018-08-24 NOTE — PROGRESS NOTE ADULT - PROBLEM SELECTOR PLAN 4
Creatinine uptrending. Encouraged PO intake. Not likely due to tacro levels as they have been therapeutic.   Potassium 5.0 today - cont monitor

## 2018-08-24 NOTE — PROGRESS NOTE ADULT - SUBJECTIVE AND OBJECTIVE BOX
VITAL SIGNS    Telemetry:  SR/ST   Vital Signs Last 24 Hrs  T(C): 36.7 (18 @ 12:34), Max: 36.9 (18 @ 20:30)  T(F): 98 (18 @ 12:34), Max: 98.4 (18 @ 20:30)  HR: 110 (18 @ 12:34) (96 - 110)  BP: 118/80 (18 @ 12:34) (107/80 - 118/80)  RR: 18 (18 @ 12:34) (18 - 18)  SpO2: 98% (18 @ 12:34) (95% - 98%)             @ 07:01  -   @ 07:00  --------------------------------------------------------  IN: 930 mL / OUT: 1350 mL / NET: -420 mL     @ 07:01  -   @ 15:04  --------------------------------------------------------  IN: 480 mL / OUT: 300 mL / NET: 180 mL       Daily     Daily Weight in k (24 Aug 2018 08:23)  Admit Wt: Drug Dosing Weight  Height (cm): 172.72 (14 Aug 2018 12:54)  Weight (kg): 78.075 (14 Aug 2018 14:42)  BMI (kg/m2): 26.2 (14 Aug 2018 14:42)  BSA (m2): 1.92 (14 Aug 2018 14:42)      CAPILLARY BLOOD GLUCOSE      POCT Blood Glucose.: 106 mg/dL (24 Aug 2018 12:03)  POCT Blood Glucose.: 110 mg/dL (24 Aug 2018 07:27)  POCT Blood Glucose.: 103 mg/dL (23 Aug 2018 21:48)  POCT Blood Glucose.: 114 mg/dL (23 Aug 2018 16:53)          MEDICATIONS  acetaminophen    Suspension 500 milliGRAM(s) Oral every 6 hours PRN  aspirin enteric coated 81 milliGRAM(s) Oral daily  atovaquone Suspension 1500 milliGRAM(s) Oral daily  Calcium Citrate + Vit D 315mg/250 IU 2 Tablet(s) 2 Tablet(s) Oral <User Schedule>  cefepime   IVPB      cefepime   IVPB 1000 milliGRAM(s) IV Intermittent every 8 hours  docusate sodium 100 milliGRAM(s) Oral two times a day PRN  famotidine    Tablet 20 milliGRAM(s) Oral daily  insulin lispro (HumaLOG) corrective regimen sliding scale   SubCutaneous three times a day before meals  insulin lispro (HumaLOG) corrective regimen sliding scale   SubCutaneous at bedtime  magnesium oxide 400 milliGRAM(s) Oral two times a day with meals  metroNIDAZOLE  IVPB      metroNIDAZOLE  IVPB 500 milliGRAM(s) IV Intermittent every 8 hours  multivitamin 1 Tablet(s) Oral daily  pravastatin 20 milliGRAM(s) Oral at bedtime  predniSONE   Tablet 3 milliGRAM(s) Oral <User Schedule>  sodium bicarbonate 650 milliGRAM(s) Oral two times a day  sodium chloride 0.65% Nasal 1 Spray(s) Both Nostrils three times a day  tacrolimus 0.5 milliGRAM(s) Oral <User Schedule>  tacrolimus 1 milliGRAM(s) Oral <User Schedule>  vancomycin    Solution 125 milliGRAM(s) Oral every 6 hours  voriconazole 200 milliGRAM(s) Oral every 12 hours      PHYSICAL EXAM  Subjective: Pt states he feels well. Stool was not liquid like in consistency today. BM X 1  Neurology: alert and oriented x 3, nonfocal, no gross deficits  CV : s1, s2  Sternal Wound :  CDI , Stable  Lungs: Right base diminished  Abdomen: soft, NT,ND, (+) Bowel movement  :  voiding  Extremities:  -c/c/e. Neg calve tenderness b/l. Pedal pulses 2+ b/l     LABS      139  |  105  |  24<H>  ----------------------------<  90  5.0   |  22  |  1.25    Ca    9.6      24 Aug 2018 07:17                                   8.6    4.1   )-----------( 625      ( 24 Aug 2018 07:17 )             28.8

## 2018-08-24 NOTE — PROGRESS NOTE ADULT - PROBLEM SELECTOR PLAN 2
Tacro subtherapeutic 7.7 today at , goal 8-10  given NORMAN will continue 0.5 in am and 1 in pm,   cont prednisone  3mg qd   pt off cellcept due to hx of fungal infection and now with leukopenia- will cont hold   Pt intermediate risk CMV (D-/R+) - pt did complete 6 months of valcyte however with recurrent infections. Leukopenia and improved. - repeat CMV PCR is pending   Intermediate risk for toxo (D-/R+) - on mepron 1500   Hep C + donor s/p course of Mavyret with undetectable viral load    CAV: asa/pravastatin

## 2018-08-24 NOTE — PROGRESS NOTE ADULT - ASSESSMENT
69 yo M s/p heart transplant complicated by rejection with rounds of plasmaphoresis , IVIG, rituximab x2, known colonizer with  Pseudomonas last treated for PNA in June 2018 with cefepime and voriconazole now presents with fever, decreased PO intake, productive cough. Pt had CT guided R lung mass bx on 8/17. Ddx include recurrent pneumonia vs gout flare  vs other infectious process.     Fungitell negative  Galactomannan negative  Crypto Ag negative  Quant indeterminate, however lung biopsy w negative AFB stain  Pathology report from 8/17 R lung bx showing inflammation w negative AFB and GMS stain  CT guided biopsy cultures are NGTD- only grew a corynebacterium that is likely not a pathogen  Hep C neg (8/22)    Recommend:   - CT chest on 8/14 suspicious for fungal infection, serologies have been negative and pt clinically improving; however would favor repeat CT Chest early next week- if infiltrates not improved/ worsening will likely need repeat lung biopsy for definitive dx  - f/u CMV PCR  - Continue Cefepime and Voriconazole for pna  - C.diff- diarrhea improving with oral Vanco/IV flagyl   - OI prophylaxis with Mepron; s/p 6 months of Valganciclovir

## 2018-08-24 NOTE — PROGRESS NOTE ADULT - PROBLEM SELECTOR PLAN 1
Pt with h/o colonized carbapenem resistant pseudomonas   will cont voriconazole, and cefepime per ID   Plan for repeat CT scan on monday - with possible repeat biopsy if no improvement  workup thus far negative - repeat aspergillus, fungitel and crypto negative   s/p lung tissue biopsy with IR on 8/18/2018 - Corynebacterium (likely contaminant)

## 2018-08-24 NOTE — PROGRESS NOTE ADULT - PROBLEM SELECTOR PLAN 4
Resolved. But slightly uptrending. Encouraged PO intake. Not likely due to tacro levels as they have been therapeutic. Creatinine uptrending. Encouraged PO intake. Not likely due to tacro levels as they have been therapeutic.   Potassium 5.0 today - cont monitor

## 2018-08-24 NOTE — PROGRESS NOTE ADULT - SUBJECTIVE AND OBJECTIVE BOX
Follow Up:  No events overnight. Had one episode of diarrhea today. Eating/drinking well.      ROS:    All other systems negative    Constitutional: no fever, no chills  Head: no trauma  Eyes: no vision changes, no eye pain  ENT:  no sore throat, no rhinorrhea  Cardiovascular:  no chest pain, no palpitation  Respiratory:  no SOB, no cough  GI:  no abd pain, no vomiting, + diarrhea  urinary: no dysuria, no hematuria, no flank pain  musculoskeletal:  no joint pain, no joint swelling  skin:  no rash  neurology:  no headache, no seizure, no change in mental status  psych: no anxiety, no depression         Allergies  No Known Allergies        ANTIMICROBIALS:  atovaquone Suspension 1500 daily  cefepime   IVPB    cefepime   IVPB 1000 every 8 hours  metroNIDAZOLE  IVPB    metroNIDAZOLE  IVPB 500 every 8 hours  vancomycin    Solution 125 every 6 hours  voriconazole 200 every 12 hours      OTHER MEDS:  acetaminophen    Suspension 500 milliGRAM(s) Oral every 6 hours PRN  aspirin enteric coated 81 milliGRAM(s) Oral daily  Calcium Citrate + Vit D 315mg/250 IU 2 Tablet(s) 2 Tablet(s) Oral <User Schedule>  docusate sodium 100 milliGRAM(s) Oral two times a day PRN  famotidine    Tablet 20 milliGRAM(s) Oral daily  insulin lispro (HumaLOG) corrective regimen sliding scale   SubCutaneous three times a day before meals  insulin lispro (HumaLOG) corrective regimen sliding scale   SubCutaneous at bedtime  magnesium oxide 400 milliGRAM(s) Oral two times a day with meals  multivitamin 1 Tablet(s) Oral daily  pravastatin 20 milliGRAM(s) Oral at bedtime  predniSONE   Tablet 3 milliGRAM(s) Oral <User Schedule>  sodium bicarbonate 650 milliGRAM(s) Oral two times a day  sodium chloride 0.65% Nasal 1 Spray(s) Both Nostrils three times a day  tacrolimus 0.5 milliGRAM(s) Oral <User Schedule>  tacrolimus 1 milliGRAM(s) Oral <User Schedule>      Vital Signs Last 24 Hrs  T(C): 36.7 (24 Aug 2018 12:34), Max: 36.9 (23 Aug 2018 20:30)  T(F): 98 (24 Aug 2018 12:34), Max: 98.4 (23 Aug 2018 20:30)  HR: 110 (24 Aug 2018 12:34) (96 - 110)  BP: 118/80 (24 Aug 2018 12:34) (107/80 - 118/80)  BP(mean): --  RR: 18 (24 Aug 2018 12:34) (18 - 18)  SpO2: 98% (24 Aug 2018 12:34) (95% - 98%)    Physical Exam:  General:    NAD,  non toxic, A&O x 3  Head: atraumatic, normocephalic  Eye: normal sclera and conjunctiva  ENT:    no oropharyngeal lesions,   no LAD,   neck supple  Cardio:     regular S1, S2  Respiratory:    clear b/l,    no wheezing  abd:     soft,   BS +,   no tenderness,    no organomegaly  :   no CVAT,  no suprapubic tenderness  Musculoskeletal: no joint swelling, no edema  vascular: no lines, normal pulses  Skin:    no rash  Neurologic:     no focal deficit  psych: normal affect, no suicidal ideation                          8.6    4.1   )-----------( 625      ( 24 Aug 2018 07:17 )             28.8       08-24    139  |  105  |  24<H>  ----------------------------<  90  5.0   |  22  |  1.25    Ca    9.6      24 Aug 2018 07:17            MICROBIOLOGY:  v  .Body Fluid Lung - Upper Lobe Right  08-17-18   Few Corynebacterium species  "Susceptibilities not performed"  --    polymorphonuclear leukocytes seen  No organisms seen  by cytocentrifuge      .Urine Clean Catch (Midstream)  08-15-18   No growth  --  --      .Blood Blood-Peripheral  08-14-18   No growth at 5 days.  --  --      .Abscess Arm - Right  08-14-18   No growth at 5 days  --  --      .Sputum Sputum  08-14-18   Normal Respiratory Heaven present  --    Rare polymorphonuclear leukocytes per low power field  Rare Squamous epithelial cells per low power field  Moderate Gram Negative Rods per oil power field        Toxoplasma IgG Screen: 4.7 IU/mL (06-14-18 @ 09:42)  HIV-1 RNA Quantitative, Viral Load Log: NOT DET. lg /mL (05-31-18 @ 19:10)  CMV IgG Antibody: 5.40 U/mL (05-25-18 @ 17:51)  CMV IgG Antibody: >10.00 U/mL (02-22-18 @ 23:45)  HIV-1 RNA Quantitative, Viral Load Log: NOT DET. lg /mL (02-02-18 @ 19:25)    CMVPCR Log: NotDetec LogIU/mL (08-21 @ 20:39)    Clostridium difficile GDH Toxins A&amp;B, EIA:   Positive (08-18-18 @ 13:44)  Clostridium difficile GDH Interpretation: Positive for toxigenic C. Difficile.  This specimen is positive for C.  Difficile glutamate dehydrogenase (GDH) antigen and positive for C.  Difficile Toxins A & B, by EIA.  GDH is a highly sensitive marker for C.  Difficile that is produced in largeamounts by all C. Difficile strains,  both toxigenic and nontoxigenic.  This assay has not been validated as a  test of cure.  The results of this assay should always be interpreted in  conjunction with patient's clinical history. (08-18-18 @ 13:44)      RADIOLOGY:  No new imaging

## 2018-08-24 NOTE — PROGRESS NOTE ADULT - PROBLEM SELECTOR PLAN 2
Tacro therapeutic  today at , goal 8-10  will cont 0.5/1  cont prednisone  3mg qd   pt off cellcept due to hx of fungal infection and now with leukopenia- will cont hold   Pt intermediate risk CMV (D-/R+) - pt did complete 6 months of valcyte however with recurrent infections. Leukopenia and renal fnc improved. - repeat CMV PCR is pending   Intermediate risk for toxo (D-/R+) - on mepron 1500   Hep C + donor s/p course of Mayvret with undetectable viral load    CAV: asa/pravastatin Tacro subtherapeutic 7.7 today at , goal 8-10  will give 1.5 tonight and change daily dose to 1/1,   cont prednisone  3mg qd   pt off cellcept due to hx of fungal infection and now with leukopenia- will cont hold   Pt intermediate risk CMV (D-/R+) - pt did complete 6 months of valcyte however with recurrent infections. Leukopenia and renal fnc improved. - repeat CMV PCR is pending   Intermediate risk for toxo (D-/R+) - on mepron 1500   Hep C + donor s/p course of Mayvret with undetectable viral load    CAV: asa/pravastatin Tacro subtherapeutic 7.7 today at , goal 8-10  will give 1.5 tonight and change daily dose to 1/1,   cont prednisone  3mg qd   pt off cellcept due to hx of fungal infection and now with leukopenia- will cont hold   Pt intermediate risk CMV (D-/R+) - pt did complete 6 months of valcyte however with recurrent infections. Leukopenia and renal fnc improved. - repeat CMV PCR is pending   Intermediate risk for toxo (D-/R+) - on mepron 1500   Hep C + donor s/p course of Mavyret with undetectable viral load    CAV: asa/pravastatin Tacro subtherapeutic 7.7 today at , goal 8-10  given NORMAN will continue 0.5 in am and 1 in pm,   cont prednisone  3mg qd   pt off cellcept due to hx of fungal infection and now with leukopenia- will cont hold   Pt intermediate risk CMV (D-/R+) - pt did complete 6 months of valcyte however with recurrent infections. Leukopenia and improved. - repeat CMV PCR is pending   Intermediate risk for toxo (D-/R+) - on mepron 1500   Hep C + donor s/p course of Mavyret with undetectable viral load    CAV: asa/pravastatin

## 2018-08-24 NOTE — PROGRESS NOTE ADULT - ASSESSMENT
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 Also complains of decreased PO intake for the last few days.  Denies nausea, vomiting or diarrhea. Of note right wrist is slightly tender, warm to touch w/ fluctuance, has had wrist swelling on previous admission with negative workup. Temp in clinic was 102 - most likely secondary to recurrent PNA based on new productive cough x 3 days and RUL infiltrate on CT scan.  8/14 Called by lab> arthrocentesis fluid sent for cell count> state not enough fluid for count  8/15 Still febrile, Tmax 101.8. Sputum Cx with mod gram neg rods. Unable to broaden cefepime- pt with hx of colonized carbapenem pseudomonas per ID. IR consulted to evaluate for transthoracic bx of R lung  8/16 CT guided biopsy of Right lung scheduled for Friday with Dr. Alfred, continued on IV antibiotics. VSS, Pt afebrile Tmax 98.4. Tacro level 11.5  8/17 awaiting Rt Lung bx, afebrile tacro level 10.6 valcyte decreased to every other day to start 8/19.   8/18 VVS; Right lung bx: Gram Stain: polymorphonuclear leukocytes seen. No organisms seen. Prednisone decreased to 3 mg PO daily as per CHF 2/2 infectious process. Continue with current medication regimen.   8/19 vvs: awaiting final results of Cardiac biopsy.  Tacro level 7.7 today   8/20 Pt is feeling better today, seen OOB in chair. Cardiac biopsy results still pending.  tacro level 8.0 today.  pt is afebrile but remains leukopenic. WBC 2.7   8/21 VVS; (+) loose stools. (+) Cdif --> Continue on PO vanco and flagyl IV per ID. Continue with current medication regimen. Start Nepro 1 can daily.  Check CMV PCR level in am. Awaiting lung bx cultures.   8/22    vss    awaiting  lab results including cmv   and rt wrist tap,   fungal cx pending  from rll infiltrate   8/23 VSS BM x 1 today- more formed in consistency. Per ID-continue C-diff abx -Vanco and flagyl x 14 days until 9/1. Repeat CT scan early next week w/ possible biopsy

## 2018-08-24 NOTE — PROGRESS NOTE ADULT - PROBLEM SELECTOR PLAN 1
Pt with h/o colonized carbapenem resistant pseudomonas   will cont vanc, voriconazole, and cefepime per ID   Plan for repeat CT scan on monday - with possible repeat biopsy if no improvement  workup thus far negative - repeat aspergillus, fungitel and crypto negative   s/p lung tissue biopsy with IR on 8/18/2018 - Corynebacterium (likely contaminant) Pt with h/o colonized carbapenem resistant pseudomonas   will cont voriconazole, and cefepime per ID   Plan for repeat CT scan on monday - with possible repeat biopsy if no improvement  workup thus far negative - repeat aspergillus, fungitel and crypto negative   s/p lung tissue biopsy with IR on 8/18/2018 - Corynebacterium (likely contaminant)

## 2018-08-25 LAB
ANION GAP SERPL CALC-SCNC: 10 MMOL/L — SIGNIFICANT CHANGE UP (ref 5–17)
BUN SERPL-MCNC: 24 MG/DL — HIGH (ref 7–23)
CALCIUM SERPL-MCNC: 9 MG/DL — SIGNIFICANT CHANGE UP (ref 8.4–10.5)
CHLORIDE SERPL-SCNC: 105 MMOL/L — SIGNIFICANT CHANGE UP (ref 96–108)
CO2 SERPL-SCNC: 23 MMOL/L — SIGNIFICANT CHANGE UP (ref 22–31)
CREAT SERPL-MCNC: 1.02 MG/DL — SIGNIFICANT CHANGE UP (ref 0.5–1.3)
GLUCOSE BLDC GLUCOMTR-MCNC: 103 MG/DL — HIGH (ref 70–99)
GLUCOSE BLDC GLUCOMTR-MCNC: 113 MG/DL — HIGH (ref 70–99)
GLUCOSE BLDC GLUCOMTR-MCNC: 150 MG/DL — HIGH (ref 70–99)
GLUCOSE BLDC GLUCOMTR-MCNC: 99 MG/DL — SIGNIFICANT CHANGE UP (ref 70–99)
GLUCOSE SERPL-MCNC: 90 MG/DL — SIGNIFICANT CHANGE UP (ref 70–99)
HCT VFR BLD CALC: 26.1 % — LOW (ref 39–50)
HGB BLD-MCNC: 7.8 G/DL — LOW (ref 13–17)
MCHC RBC-ENTMCNC: 27.6 PG — SIGNIFICANT CHANGE UP (ref 27–34)
MCHC RBC-ENTMCNC: 29.9 GM/DL — LOW (ref 32–36)
MCV RBC AUTO: 92.5 FL — SIGNIFICANT CHANGE UP (ref 80–100)
PLATELET # BLD AUTO: 603 K/UL — HIGH (ref 150–400)
POTASSIUM SERPL-MCNC: 4.7 MMOL/L — SIGNIFICANT CHANGE UP (ref 3.5–5.3)
POTASSIUM SERPL-SCNC: 4.7 MMOL/L — SIGNIFICANT CHANGE UP (ref 3.5–5.3)
RBC # BLD: 2.82 M/UL — LOW (ref 4.2–5.8)
RBC # FLD: 20.4 % — HIGH (ref 10.3–14.5)
SODIUM SERPL-SCNC: 138 MMOL/L — SIGNIFICANT CHANGE UP (ref 135–145)
TACROLIMUS SERPL-MCNC: 8.1 NG/ML — SIGNIFICANT CHANGE UP
WBC # BLD: 4.2 K/UL — SIGNIFICANT CHANGE UP (ref 3.8–10.5)
WBC # FLD AUTO: 4.2 K/UL — SIGNIFICANT CHANGE UP (ref 3.8–10.5)

## 2018-08-25 PROCEDURE — 99232 SBSQ HOSP IP/OBS MODERATE 35: CPT

## 2018-08-25 PROCEDURE — 99233 SBSQ HOSP IP/OBS HIGH 50: CPT

## 2018-08-25 RX ORDER — ACETAMINOPHEN 500 MG
650 TABLET ORAL ONCE
Qty: 0 | Refills: 0 | Status: COMPLETED | OUTPATIENT
Start: 2018-08-25 | End: 2018-08-25

## 2018-08-25 RX ADMIN — MAGNESIUM OXIDE 400 MG ORAL TABLET 400 MILLIGRAM(S): 241.3 TABLET ORAL at 18:06

## 2018-08-25 RX ADMIN — ATOVAQUONE 1500 MILLIGRAM(S): 750 SUSPENSION ORAL at 11:32

## 2018-08-25 RX ADMIN — Medication 3 MILLIGRAM(S): at 08:01

## 2018-08-25 RX ADMIN — CEFEPIME 100 MILLIGRAM(S): 1 INJECTION, POWDER, FOR SOLUTION INTRAMUSCULAR; INTRAVENOUS at 13:13

## 2018-08-25 RX ADMIN — VORICONAZOLE 200 MILLIGRAM(S): 10 INJECTION, POWDER, LYOPHILIZED, FOR SOLUTION INTRAVENOUS at 05:30

## 2018-08-25 RX ADMIN — CEFEPIME 100 MILLIGRAM(S): 1 INJECTION, POWDER, FOR SOLUTION INTRAMUSCULAR; INTRAVENOUS at 22:15

## 2018-08-25 RX ADMIN — Medication 125 MILLIGRAM(S): at 13:13

## 2018-08-25 RX ADMIN — Medication 650 MILLIGRAM(S): at 05:30

## 2018-08-25 RX ADMIN — CEFEPIME 100 MILLIGRAM(S): 1 INJECTION, POWDER, FOR SOLUTION INTRAMUSCULAR; INTRAVENOUS at 05:30

## 2018-08-25 RX ADMIN — TACROLIMUS 0.5 MILLIGRAM(S): 5 CAPSULE ORAL at 08:03

## 2018-08-25 RX ADMIN — Medication 125 MILLIGRAM(S): at 05:30

## 2018-08-25 RX ADMIN — FAMOTIDINE 20 MILLIGRAM(S): 10 INJECTION INTRAVENOUS at 11:32

## 2018-08-25 RX ADMIN — Medication 100 MILLIGRAM(S): at 14:01

## 2018-08-25 RX ADMIN — Medication 100 MILLIGRAM(S): at 22:50

## 2018-08-25 RX ADMIN — Medication 125 MILLIGRAM(S): at 22:16

## 2018-08-25 RX ADMIN — Medication 125 MILLIGRAM(S): at 17:56

## 2018-08-25 RX ADMIN — Medication 20 MILLIGRAM(S): at 22:16

## 2018-08-25 RX ADMIN — Medication 650 MILLIGRAM(S): at 17:56

## 2018-08-25 RX ADMIN — Medication 650 MILLIGRAM(S): at 15:26

## 2018-08-25 RX ADMIN — Medication 81 MILLIGRAM(S): at 11:32

## 2018-08-25 RX ADMIN — MAGNESIUM OXIDE 400 MG ORAL TABLET 400 MILLIGRAM(S): 241.3 TABLET ORAL at 08:01

## 2018-08-25 RX ADMIN — VORICONAZOLE 200 MILLIGRAM(S): 10 INJECTION, POWDER, LYOPHILIZED, FOR SOLUTION INTRAVENOUS at 17:56

## 2018-08-25 RX ADMIN — Medication 100 MILLIGRAM(S): at 05:30

## 2018-08-25 RX ADMIN — Medication 1 TABLET(S): at 11:32

## 2018-08-25 RX ADMIN — TACROLIMUS 1 MILLIGRAM(S): 5 CAPSULE ORAL at 20:00

## 2018-08-25 NOTE — PROGRESS NOTE ADULT - ASSESSMENT
67 y/o male with PMH NICM HFrEF s/p HM2 LVAD 6/2017, who underwent heart transplant on 2/23/18 (CMV D-/R+ and Toxo D-/R+, Hep C+ heart) with post op graft dysfunction who underwent plasmapheresis and IVIG x2 as well as rituximab, with suspected fungal PNA diagnosed 6/2018 on voriconazole, who presented to clinic today after routine blood work, stating he is not feeling well this morning, feels weak, no chills, no reported fever, denies chest pain or sob. Also complains of decreased PO intake for the last few days.  Denies nausea, vomiting or diarrhea. Of note right wrist is slightly tender, warm to touch w/ fluctuance, has had wrist swelling on previous admission with negative workup. Temp in clinic was 102 - most likely secondary to recurrent PNA based on new productive cough x 3 days and RUL infiltrate on CT scan.  8/14 Called by lab> arthrocentesis fluid sent for cell count> state not enough fluid for count  8/15 Still febrile, Tmax 101.8. Sputum Cx with mod gram neg rods. Unable to broaden cefepime- pt with hx of colonized carbapenem pseudomonas per ID. IR consulted to evaluate for transthoracic bx of R lung  8/16 CT guided biopsy of Right lung scheduled for Friday with Dr. Alfred, continued on IV antibiotics. VSS, Pt afebrile Tmax 98.4. Tacro level 11.5  8/17 awaiting Rt Lung bx, afebrile tacro level 10.6 valcyte decreased to every other day to start 8/19.   8/18 VVS; Right lung bx: Gram Stain: polymorphonuclear leukocytes seen. No organisms seen. Prednisone decreased to 3 mg PO daily as per CHF 2/2 infectious process. Continue with current medication regimen.   8/19 vvs: awaiting final results of Cardiac biopsy.  Tacro level 7.7 today   8/20 Pt is feeling better today, seen OOB in chair. Cardiac biopsy results still pending.  tacro level 8.0 today.  pt is afebrile but remains leukopenic. WBC 2.7   8/21 VVS; (+) loose stools. (+) Cdif --> Continue on PO vanco and flagyl IV per ID. Continue with current medication regimen. Start Nepro 1 can daily.  Check CMV PCR level in am. Awaiting lung bx cultures.   8/22    vss    awaiting  lab results including cmv   and rt wrist tap,   fungal cx pending  from rll infiltrate   8/23 VSS BM x 1 today- more formed in consistency. Per ID-continue C-diff abx -Vanco and flagyl x 14 days until 9/1. Repeat CT scan early next week w/ possible biopsy  8/25 VSS reports improved BM more formed c/w Vancomycin and flagyl until 9/1 Chest  CT scan for Monday Tacro level 8.1MG level in am

## 2018-08-25 NOTE — PROGRESS NOTE ADULT - SUBJECTIVE AND OBJECTIVE BOX
Subjective  ' hello  in bed no acute distress" I am feeling better.    VITAL SIGNS    Telemetry:  NSR   Vital Signs Last 24 Hrs  T(C): 36.5 (18 @ 06:03), Max: 36.9 (18 @ 19:48)  T(F): 97.7 (18 @ 06:03), Max: 98.4 (18 @ 19:48)  HR: 102 (18 @ 06:03) (102 - 112)  BP: 126/89 (18 @ 06:03) (118/80 - 126/89)  RR: 18 (18 @ 06:03) (18 - 18)  SpO2: 97% (18 @ 06:03) (97% - 98%)          @ 07:01  -   @ 07:00  --------------------------------------------------------  IN: 1200 mL / OUT: 925 mL / NET: 275 mL     @ 07:01  -   @ 12:09  --------------------------------------------------------  IN: 240 mL / OUT: 500 mL / NET: -260 mL    Daily Weight in k.1 (25 Aug 2018 09:04)  Tacro level  8.1MEDICATIONS  (STANDING):  aspirin enteric coated 81 milliGRAM(s) Oral daily  atovaquone Suspension 1500 milliGRAM(s) Oral daily  Calcium Citrate + Vit D 315mg/250 IU 2 Tablet(s) 2 Tablet(s) Oral <User Schedule>  cefepime   IVPB      cefepime   IVPB 1000 milliGRAM(s) IV Intermittent every 8 hours  famotidine    Tablet 20 milliGRAM(s) Oral daily  insulin lispro (HumaLOG) corrective regimen sliding scale   SubCutaneous three times a day before meals  insulin lispro (HumaLOG) corrective regimen sliding scale   SubCutaneous at bedtime  magnesium oxide 400 milliGRAM(s) Oral two times a day with meals  metroNIDAZOLE  IVPB      metroNIDAZOLE  IVPB 500 milliGRAM(s) IV Intermittent every 8 hours  multivitamin 1 Tablet(s) Oral daily  pravastatin 20 milliGRAM(s) Oral at bedtime  predniSONE   Tablet 3 milliGRAM(s) Oral <User Schedule>  sodium bicarbonate 650 milliGRAM(s) Oral two times a day  sodium chloride 0.65% Nasal 1 Spray(s) Both Nostrils three times a day  tacrolimus 0.5 milliGRAM(s) Oral <User Schedule>  tacrolimus 1 milliGRAM(s) Oral <User Schedule>  vancomycin    Solution 125 milliGRAM(s) Oral every 6 hours  voriconazole 200 milliGRAM(s) Oral every 12 hours    MEDICATIONS  (PRN):  acetaminophen    Suspension 500 milliGRAM(s) Oral every 6 hours PRN For Temp greater than 38.5 C (101.3 F)  docusate sodium 100 milliGRAM(s) Oral two times a day PRN Constipation    CAPILLARY BLOOD GLUCOSE  POCT Blood Glucose.: 113 mg/dL (25 Aug 2018 11:30)  POCT Blood Glucose.: 132 mg/dL (24 Aug 2018 22:00)  POCT Blood Glucose.: 121 mg/dL (24 Aug 2018 16:53)        PHYSICAL EXAM  Neurology: alert and oriented x 3, nonfocal, no gross deficits  CV :S1 S2 RRR  Sternal Wound :  NAZ sternum Stable  Lungs:B/L CTA  Abdomen: soft, nontender, nondistended, positive bowel sounds, last bowel movement   : Voiding            Extremities:  B/l Le warm well perfused no calf tenderness          Physical Therapy Rec:   Home  [x  ]   Home w/ PT  [  ]  Rehab  [  ]    Discussed with Cardiothoracic Team at AM rounds.

## 2018-08-25 NOTE — PROGRESS NOTE ADULT - SUBJECTIVE AND OBJECTIVE BOX
Interval History: generally feels well. no BM yet today.     Medications:  acetaminophen    Suspension 500 milliGRAM(s) Oral every 6 hours PRN  aspirin enteric coated 81 milliGRAM(s) Oral daily  atovaquone Suspension 1500 milliGRAM(s) Oral daily  Calcium Citrate + Vit D 315mg/250 IU 2 Tablet(s) 2 Tablet(s) Oral <User Schedule>   cefepime   IVPB 1000 milliGRAM(s) IV Intermittent every 8 hours  docusate sodium 100 milliGRAM(s) Oral two times a day PRN  famotidine    Tablet 20 milliGRAM(s) Oral daily  insulin lispro (HumaLOG) corrective regimen sliding scale   SubCutaneous three times a day before meals  insulin lispro (HumaLOG) corrective regimen sliding scale   SubCutaneous at bedtime  magnesium oxide 400 milliGRAM(s) Oral two times a day with meals  metroNIDAZOLE  IVPB      metroNIDAZOLE  IVPB 500 milliGRAM(s) IV Intermittent every 8 hours  multivitamin 1 Tablet(s) Oral daily  pravastatin 20 milliGRAM(s) Oral at bedtime  predniSONE   Tablet 3 milliGRAM(s) Oral <User Schedule>  sodium bicarbonate 650 milliGRAM(s) Oral two times a day  sodium chloride 0.65% Nasal 1 Spray(s) Both Nostrils three times a day  tacrolimus 0.5 milliGRAM(s) Oral QAM  tacrolimus 1 milliGRAM(s) Oral QPM  vancomycin    Solution 125 milliGRAM(s) Oral every 6 hours  voriconazole 200 milliGRAM(s) Oral every 12 hours    Vitals:  T(C): 36.5 (18 @ 06:03), Max: 36.9 (18 @ 19:48)  HR: 102 (18 @ 06:03) (102 - 112)  BP: 126/89 (18 @ 06:03) (118/80 - 126/89)  RR: 18 (18 @ 06:03) (18 - 18)  SpO2: 97% (18 @ 06:03) (97% - 98%)      Daily     Daily Weight in k.1 (25 Aug 2018 09:04)        I&O's Summary    24 Aug 2018 07:01  -  25 Aug 2018 07:00  --------------------------------------------------------  IN: 1200 mL / OUT: 925 mL / NET: 275 mL    25 Aug 2018 07:  -  25 Aug 2018 10:46  --------------------------------------------------------  IN: 0 mL / OUT: 200 mL / NET: -200 mL        Physical Exam:  Appearance: No Acute Distress  HEENT: JVP <6 cm H2O, no HJR  Cardiovascular: tachy, but regular rhythm, Normal S1 S2, No murmurs/rubs/gallops  Respiratory: Clear to auscultation bilaterally  Gastrointestinal: Soft, Non-tender, non-distended	  Skin: no skin lesions  Neurologic: Non-focal  Extremities: No LE edema, warm and well perfused  Psychiatry: A & O x 3, Mood & affect appropriate      Labs:  No labs available 18.  Prior labs:                     8.6    4.1   )-----------( 625      ( 24 Aug 2018 07:17 )             28.8     08-    139  |  105  |  24<H>  ----------------------------<  90  5.0   |  22  |  1.25    Ca    9.6      24 Aug 2018 07:17      Tacrolimus 8.1 18 Interval History: generally feels well. no BM yet today.     Medications:  acetaminophen    Suspension 500 milliGRAM(s) Oral every 6 hours PRN  aspirin enteric coated 81 milliGRAM(s) Oral daily  atovaquone Suspension 1500 milliGRAM(s) Oral daily  Calcium Citrate + Vit D 315mg/250 IU 2 Tablet(s) 2 Tablet(s) Oral <User Schedule>   cefepime   IVPB 1000 milliGRAM(s) IV Intermittent every 8 hours  docusate sodium 100 milliGRAM(s) Oral two times a day PRN  famotidine    Tablet 20 milliGRAM(s) Oral daily  insulin lispro (HumaLOG) corrective regimen sliding scale   SubCutaneous three times a day before meals  insulin lispro (HumaLOG) corrective regimen sliding scale   SubCutaneous at bedtime  magnesium oxide 400 milliGRAM(s) Oral two times a day with meals  metroNIDAZOLE  IVPB      metroNIDAZOLE  IVPB 500 milliGRAM(s) IV Intermittent every 8 hours  multivitamin 1 Tablet(s) Oral daily  pravastatin 20 milliGRAM(s) Oral at bedtime  predniSONE   Tablet 3 milliGRAM(s) Oral <User Schedule>  sodium bicarbonate 650 milliGRAM(s) Oral two times a day  sodium chloride 0.65% Nasal 1 Spray(s) Both Nostrils three times a day  tacrolimus 0.5 milliGRAM(s) Oral QAM  tacrolimus 1 milliGRAM(s) Oral QPM  vancomycin    Solution 125 milliGRAM(s) Oral every 6 hours  voriconazole 200 milliGRAM(s) Oral every 12 hours    Vitals:  T(C): 36.5 (18 @ 06:03), Max: 36.9 (18 @ 19:48)  HR: 102 (18 @ 06:03) (102 - 112)  BP: 126/89 (18 @ 06:03) (118/80 - 126/89)  RR: 18 (18 @ 06:03) (18 - 18)  SpO2: 97% (18 @ 06:03) (97% - 98%)      Daily     Daily Weight in k.1 (25 Aug 2018 09:04)        I&O's Summary    24 Aug 2018 07:01  -  25 Aug 2018 07:00  --------------------------------------------------------  IN: 1200 mL / OUT: 925 mL / NET: 275 mL    25 Aug 2018 07:  -  25 Aug 2018 10:46  --------------------------------------------------------  IN: 0 mL / OUT: 200 mL / NET: -200 mL        Physical Exam:  Appearance: No Acute Distress  HEENT: JVP <6 cm H2O, no HJR  Cardiovascular: tachy, but regular rhythm, Normal S1 S2, Holosystolic harsh blowing murmur LSB	  Respiratory: Clear to auscultation bilaterally  Gastrointestinal: Soft, Non-tender, non-distended	  Skin: no skin lesions  Neurologic: Non-focal  Extremities: No LE edema, warm and well perfused  Psychiatry: A & O x 3, Mood & affect appropriate      Labs:  No labs available 18.  Prior labs:                     8.6    4.1   )-----------( 625      ( 24 Aug 2018 07:17 )             28.8     08-    139  |  105  |  24<H>  ----------------------------<  90  5.0   |  22  |  1.25    Ca    9.6      24 Aug 2018 07:17      Tacrolimus 8.1 18

## 2018-08-25 NOTE — PROGRESS NOTE ADULT - PROBLEM SELECTOR PLAN 1
- Improving on current Abx. Prior pseudomonas, colonization?  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

## 2018-08-25 NOTE — PROGRESS NOTE ADULT - PROBLEM SELECTOR PLAN 4
- modest reduction in renal function. No labs drawn today.  - check BMP and magnesium level tomorrow  - avoid nephrotoxins

## 2018-08-25 NOTE — PROGRESS NOTE ADULT - PROBLEM SELECTOR PLAN 2
Immunosuppression prophylaxis:  Tacrolimus 0.5/1. Level 8.1 today (goal 8-10).  CellCept on hold due to leukopenia and infection.   Prednisone 3 mg po daily (since 8/19). Would further taper to 2 mg po daily on 9/10/18.    Antimicrobial prophylaxis:  Intermediate risk CMV - Valcyte held due to leukopenia. He is 6 months post-transplant so will monitor CMV PCR with monthly labs and not resume.  Intermediate risk Toxo - continue Mepron 1500 mg po daily  PCP prophy - covered by Mepron.  Hep C+ - he completed a course of Mayvret with undetectable Hep C virus now. Will need follow-up increased risk donor testing at 12 months post-OHT.    Other prophylaxis:  Pepcid 20 mg po daily  ECASA 81 mg po daily  Citracal + D 2 tabs po BID  Pravachol 20 mg po QHS

## 2018-08-26 LAB
ANION GAP SERPL CALC-SCNC: 10 MMOL/L — SIGNIFICANT CHANGE UP (ref 5–17)
BUN SERPL-MCNC: 24 MG/DL — HIGH (ref 7–23)
CALCIUM SERPL-MCNC: 9.2 MG/DL — SIGNIFICANT CHANGE UP (ref 8.4–10.5)
CHLORIDE SERPL-SCNC: 104 MMOL/L — SIGNIFICANT CHANGE UP (ref 96–108)
CO2 SERPL-SCNC: 24 MMOL/L — SIGNIFICANT CHANGE UP (ref 22–31)
CREAT SERPL-MCNC: 1.24 MG/DL — SIGNIFICANT CHANGE UP (ref 0.5–1.3)
GLUCOSE BLDC GLUCOMTR-MCNC: 101 MG/DL — HIGH (ref 70–99)
GLUCOSE BLDC GLUCOMTR-MCNC: 114 MG/DL — HIGH (ref 70–99)
GLUCOSE BLDC GLUCOMTR-MCNC: 92 MG/DL — SIGNIFICANT CHANGE UP (ref 70–99)
GLUCOSE BLDC GLUCOMTR-MCNC: 96 MG/DL — SIGNIFICANT CHANGE UP (ref 70–99)
GLUCOSE SERPL-MCNC: 99 MG/DL — SIGNIFICANT CHANGE UP (ref 70–99)
HCT VFR BLD CALC: 28.9 % — LOW (ref 39–50)
HGB BLD-MCNC: 9 G/DL — LOW (ref 13–17)
MAGNESIUM SERPL-MCNC: 2 MG/DL — SIGNIFICANT CHANGE UP (ref 1.6–2.6)
MCHC RBC-ENTMCNC: 29 PG — SIGNIFICANT CHANGE UP (ref 27–34)
MCHC RBC-ENTMCNC: 31.1 GM/DL — LOW (ref 32–36)
MCV RBC AUTO: 93.4 FL — SIGNIFICANT CHANGE UP (ref 80–100)
PLATELET # BLD AUTO: 610 K/UL — HIGH (ref 150–400)
POTASSIUM SERPL-MCNC: 5.1 MMOL/L — SIGNIFICANT CHANGE UP (ref 3.5–5.3)
POTASSIUM SERPL-SCNC: 5.1 MMOL/L — SIGNIFICANT CHANGE UP (ref 3.5–5.3)
RBC # BLD: 3.09 M/UL — LOW (ref 4.2–5.8)
RBC # FLD: 20.6 % — HIGH (ref 10.3–14.5)
SODIUM SERPL-SCNC: 138 MMOL/L — SIGNIFICANT CHANGE UP (ref 135–145)
TACROLIMUS SERPL-MCNC: 10.3 NG/ML — SIGNIFICANT CHANGE UP
WBC # BLD: 4.4 K/UL — SIGNIFICANT CHANGE UP (ref 3.8–10.5)
WBC # FLD AUTO: 4.4 K/UL — SIGNIFICANT CHANGE UP (ref 3.8–10.5)

## 2018-08-26 PROCEDURE — 99233 SBSQ HOSP IP/OBS HIGH 50: CPT

## 2018-08-26 RX ADMIN — Medication 500 MILLIGRAM(S): at 06:20

## 2018-08-26 RX ADMIN — Medication 125 MILLIGRAM(S): at 11:43

## 2018-08-26 RX ADMIN — MAGNESIUM OXIDE 400 MG ORAL TABLET 400 MILLIGRAM(S): 241.3 TABLET ORAL at 17:00

## 2018-08-26 RX ADMIN — MAGNESIUM OXIDE 400 MG ORAL TABLET 400 MILLIGRAM(S): 241.3 TABLET ORAL at 08:01

## 2018-08-26 RX ADMIN — Medication 1 TABLET(S): at 11:44

## 2018-08-26 RX ADMIN — Medication 125 MILLIGRAM(S): at 21:58

## 2018-08-26 RX ADMIN — CEFEPIME 100 MILLIGRAM(S): 1 INJECTION, POWDER, FOR SOLUTION INTRAMUSCULAR; INTRAVENOUS at 13:43

## 2018-08-26 RX ADMIN — Medication 100 MILLIGRAM(S): at 13:44

## 2018-08-26 RX ADMIN — Medication 650 MILLIGRAM(S): at 17:00

## 2018-08-26 RX ADMIN — VORICONAZOLE 200 MILLIGRAM(S): 10 INJECTION, POWDER, LYOPHILIZED, FOR SOLUTION INTRAVENOUS at 06:09

## 2018-08-26 RX ADMIN — Medication 3 MILLIGRAM(S): at 08:01

## 2018-08-26 RX ADMIN — Medication 100 MILLIGRAM(S): at 06:08

## 2018-08-26 RX ADMIN — Medication 125 MILLIGRAM(S): at 17:00

## 2018-08-26 RX ADMIN — Medication 81 MILLIGRAM(S): at 13:43

## 2018-08-26 RX ADMIN — CEFEPIME 100 MILLIGRAM(S): 1 INJECTION, POWDER, FOR SOLUTION INTRAMUSCULAR; INTRAVENOUS at 21:58

## 2018-08-26 RX ADMIN — TACROLIMUS 0.5 MILLIGRAM(S): 5 CAPSULE ORAL at 08:05

## 2018-08-26 RX ADMIN — FAMOTIDINE 20 MILLIGRAM(S): 10 INJECTION INTRAVENOUS at 11:43

## 2018-08-26 RX ADMIN — TACROLIMUS 1 MILLIGRAM(S): 5 CAPSULE ORAL at 20:47

## 2018-08-26 RX ADMIN — Medication 125 MILLIGRAM(S): at 06:09

## 2018-08-26 RX ADMIN — CEFEPIME 100 MILLIGRAM(S): 1 INJECTION, POWDER, FOR SOLUTION INTRAMUSCULAR; INTRAVENOUS at 06:08

## 2018-08-26 RX ADMIN — Medication 650 MILLIGRAM(S): at 06:09

## 2018-08-26 RX ADMIN — VORICONAZOLE 200 MILLIGRAM(S): 10 INJECTION, POWDER, LYOPHILIZED, FOR SOLUTION INTRAVENOUS at 17:01

## 2018-08-26 RX ADMIN — ATOVAQUONE 1500 MILLIGRAM(S): 750 SUSPENSION ORAL at 13:43

## 2018-08-26 RX ADMIN — Medication 100 MILLIGRAM(S): at 21:58

## 2018-08-26 RX ADMIN — Medication 20 MILLIGRAM(S): at 21:58

## 2018-08-26 NOTE — PROGRESS NOTE ADULT - PROBLEM SELECTOR PLAN 2
Tacro subtherapeutic 10.3 today at , goal 8-10  given NORMAN will continue 0.5 in am and 1 in pm,   cont prednisone  3mg qd   pt off cellcept due to hx of fungal infection and now with leukopenia- will cont hold   Pt intermediate risk CMV (D-/R+) - pt did complete 6 months of valcyte however with recurrent infections. Leukopenia and improved. - repeat CMV PCR is pending   Intermediate risk for toxo (D-/R+) - on mepron 1500   Hep C + donor s/p course of Mavyret with undetectable viral load    CAV: asa/pravastatin

## 2018-08-26 NOTE — PROGRESS NOTE ADULT - PROBLEM SELECTOR PLAN 3
- diarrhea seems to have resolved.  - complete course of oral vanco and IV Flagyl - diarrhea seems to have resolved.  - will complete course of oral vanco and IV Flagyl - diarrhea seems to have resolved.  - will complete course of oral vanco and IV Flagyl (started 8/18)

## 2018-08-26 NOTE — PROGRESS NOTE ADULT - SUBJECTIVE AND OBJECTIVE BOX
Subjective "Hello OOB in chair "    VITAL SIGNS    Telemetry: -110      Vital Signs Last 24 Hrs  T(C): 36.6 (18 @ 05:41), Max: 36.6 (18 @ 22:30)  T(F): 97.8 (18 @ 05:41), Max: 97.8 (18 @ 22:30)  HR: 104 (18 @ 05:41) (104 - 109)  BP: 126/89 (18 @ 05:41) (113/82 - 126/89)  RR: 16 (18 @ 05:41) (16 - 18)  SpO2: 96% (18 @ 05:41) (94% - 96%)            @ 07:01  -   @ 07:00  --------------------------------------------------------  IN: 1110 mL / OUT: 1450 mL / NET: -340 mL     @ 07:01  -   @ 11:08  --------------------------------------------------------  IN: 0 mL / OUT: 450 mL / NET: -450 mL  Daily Weight in k.9 (26 Aug 2018 08:09)    CAPILLARY BLOOD GLUCOSE  POCT Blood Glucose.: 101 mg/dL (26 Aug 2018 07:57)  POCT Blood Glucose.: 103 mg/dL (25 Aug 2018 22:26)  POCT Blood Glucose.: 150 mg/dL (25 Aug 2018 16:47)  POCT Blood Glucose.: 113 mg/dL (25 Aug 2018 11:30)MEDICATIONS  (STANDING):  aspirin enteric coated 81 milliGRAM(s) Oral daily  atovaquone Suspension 1500 milliGRAM(s) Oral daily  Calcium Citrate + Vit D 315mg/250 IU 2 Tablet(s) 2 Tablet(s) Oral <User Schedule>  cefepime   IVPB      cefepime   IVPB 1000 milliGRAM(s) IV Intermittent every 8 hours  famotidine    Tablet 20 milliGRAM(s) Oral daily  insulin lispro (HumaLOG) corrective regimen sliding scale   SubCutaneous three times a day before meals  insulin lispro (HumaLOG) corrective regimen sliding scale   SubCutaneous at bedtime  magnesium oxide 400 milliGRAM(s) Oral two times a day with meals  metroNIDAZOLE  IVPB      metroNIDAZOLE  IVPB 500 milliGRAM(s) IV Intermittent every 8 hours  multivitamin 1 Tablet(s) Oral daily  pravastatin 20 milliGRAM(s) Oral at bedtime  predniSONE   Tablet 3 milliGRAM(s) Oral <User Schedule>  sodium bicarbonate 650 milliGRAM(s) Oral two times a day  sodium chloride 0.65% Nasal 1 Spray(s) Both Nostrils three times a day  tacrolimus 0.5 milliGRAM(s) Oral <User Schedule>  tacrolimus 1 milliGRAM(s) Oral <User Schedule>  vancomycin    Solution 125 milliGRAM(s) Oral every 6 hours  voriconazole 200 milliGRAM(s) Oral every 12 hours    MEDICATIONS  (PRN):  acetaminophen    Suspension 500 milliGRAM(s) Oral every 6 hours PRN For Temp greater than 38.5 C (101.3 F)  docusate sodium 100 milliGRAM(s) Oral two times a day PRN Constipation       PHYSICAL EXAM  Neurology: alert and oriented x 3, nonfocal, no gross deficits  CV : S1 S2 RRR  Sternal Wound :  Healed Sternum Stable  Lungs: B/l CTA  Abdomen: soft, nontender, nondistended, positive bowel sounds, last bowel movement  formed x2  :  voids           Extremities:  B/l le warm well perfused + DP        Physical Therapy Rec:   Home  x[  ]   Home w/ PT  [  ]  Rehab  [  ]    Discussed with Cardiothoracic Team at AM rounds.

## 2018-08-26 NOTE — PROGRESS NOTE ADULT - SUBJECTIVE AND OBJECTIVE BOX
Interval History:    Medications:  acetaminophen    Suspension 500 milliGRAM(s) Oral every 6 hours PRN  aspirin enteric coated 81 milliGRAM(s) Oral daily  atovaquone Suspension 1500 milliGRAM(s) Oral daily  Calcium Citrate + Vit D 315mg/250 IU 2 Tablet(s) 2 Tablet(s) Oral <User Schedule>  cefepime   IVPB      cefepime   IVPB 1000 milliGRAM(s) IV Intermittent every 8 hours  docusate sodium 100 milliGRAM(s) Oral two times a day PRN  famotidine    Tablet 20 milliGRAM(s) Oral daily  insulin lispro (HumaLOG) corrective regimen sliding scale   SubCutaneous three times a day before meals  insulin lispro (HumaLOG) corrective regimen sliding scale   SubCutaneous at bedtime  magnesium oxide 400 milliGRAM(s) Oral two times a day with meals  metroNIDAZOLE  IVPB      metroNIDAZOLE  IVPB 500 milliGRAM(s) IV Intermittent every 8 hours  multivitamin 1 Tablet(s) Oral daily  pravastatin 20 milliGRAM(s) Oral at bedtime  predniSONE   Tablet 3 milliGRAM(s) Oral <User Schedule>  sodium bicarbonate 650 milliGRAM(s) Oral two times a day  sodium chloride 0.65% Nasal 1 Spray(s) Both Nostrils three times a day  tacrolimus 0.5 milliGRAM(s) Oral <User Schedule>  tacrolimus 1 milliGRAM(s) Oral <User Schedule>  vancomycin    Solution 125 milliGRAM(s) Oral every 6 hours  voriconazole 200 milliGRAM(s) Oral every 12 hours    Vitals:  T(C): 36.6 (18 @ 05:41), Max: 36.6 (18 @ 22:30)  HR: 104 (18 @ 05:41) (104 - 109)  BP: 126/89 (18 @ 05:41) (113/82 - 126/89)  RR: 16 (18 @ 05:41) (16 - 18)  SpO2: 96% (18 @ 05:41) (94% - 96%)  Wt(kg): --      Daily     Daily Weight in k.1 (25 Aug 2018 09:04)        I&O's Summary    25 Aug 2018 07:01  -  26 Aug 2018 07:00  --------------------------------------------------------  IN: 1110 mL / OUT: 1450 mL / NET: -340 mL        Physical Exam:  Appearance: No Acute Distress  HEENT: JVP <6 cm H2O, no HJR  Cardiovascular: tachy, but regular rhythm, Normal S1 S2, Holosystolic harsh blowing murmur LSB  Respiratory: Clear to auscultation bilaterally  Gastrointestinal: Soft, Non-tender, non-distended	  Skin: no skin lesions  Neurologic: Non-focal  Extremities: No LE edema, warm and well perfused  Psychiatry: A & O x 3, Mood & affect appropriate          Labs:                        9.0    4.4   )-----------( 610      ( 26 Aug 2018 06:32 )             28.9         138  |  104  |  24<H>  ----------------------------<  99  5.1   |  24  |  1.24    Ca    9.2      26 Aug 2018 06:32  Mg     2.0                               TELEMETRY:    [ ] Echocardiogram: Interval History:    Medications:  acetaminophen    Suspension 500 milliGRAM(s) Oral every 6 hours PRN  aspirin enteric coated 81 milliGRAM(s) Oral daily  atovaquone Suspension 1500 milliGRAM(s) Oral daily  Calcium Citrate + Vit D 315mg/250 IU 2 Tablet(s) 2 Tablet(s) Oral <User Schedule>    cefepime   IVPB 1000 milliGRAM(s) IV Intermittent every 8 hours  docusate sodium 100 milliGRAM(s) Oral two times a day PRN  famotidine    Tablet 20 milliGRAM(s) Oral daily  insulin lispro (HumaLOG) corrective regimen sliding scale   SubCutaneous three times a day before meals  insulin lispro (HumaLOG) corrective regimen sliding scale   SubCutaneous at bedtime  magnesium oxide 400 milliGRAM(s) Oral two times a day with meals     metroNIDAZOLE  IVPB 500 milliGRAM(s) IV Intermittent every 8 hours  multivitamin 1 Tablet(s) Oral daily  pravastatin 20 milliGRAM(s) Oral at bedtime  predniSONE   Tablet 3 milliGRAM(s) Oral daily  sodium bicarbonate 650 milliGRAM(s) Oral two times a day  tacrolimus 0.5 milliGRAM(s) Oral QAM  tacrolimus 1 milliGRAM(s) Oral QPM  vancomycin    Solution 125 milliGRAM(s) Oral every 6 hours  voriconazole 200 milliGRAM(s) Oral every 12 hours    Vitals:  T(C): 36.6 (18 @ 05:41), Max: 36.6 (18 @ 22:30)  HR: 104 (18 @ 05:41) (104 - 109)  BP: 126/89 (18 @ 05:41) (113/82 - 126/89)  RR: 16 (18 @ 05:41) (16 - 18)  SpO2: 96% (18 @ 05:41) (94% - 96%)      Daily     Daily Weight in k.1 (25 Aug 2018 09:04)      I&O's Summary    25 Aug 2018 07:01  -  26 Aug 2018 07:00  --------------------------------------------------------  IN: 1110 mL / OUT: 1450 mL / NET: -340 mL        Physical Exam:  Appearance: No Acute Distress  HEENT: JVP <6 cm H2O, no HJR  Cardiovascular: tachy, but regular rhythm, Normal S1 S2, Holosystolic harsh blowing murmur LSB  Respiratory: Clear to auscultation bilaterally  Gastrointestinal: Soft, Non-tender, non-distended	  Skin: no skin lesions  Neurologic: Non-focal  Extremities: No LE edema, warm and well perfused  Psychiatry: A & O x 3, Mood & affect appropriate          Labs:                        9.0    4.4   )-----------( 610      ( 26 Aug 2018 06:32 )             28.9     08-26    138  |  104  |  24<H>  ----------------------------<  99  5.1   |  24  |  1.24    Ca    9.2      26 Aug 2018 06:32  Mg     2.0     -                          TELEMETRY:    [ ] Echocardiogram: Interval History: he continues to improve and reports BMs are better, no diarrhea, ambulating without difficulty.    Medications:  acetaminophen    Suspension 500 milliGRAM(s) Oral every 6 hours PRN  aspirin enteric coated 81 milliGRAM(s) Oral daily  atovaquone Suspension 1500 milliGRAM(s) Oral daily  Calcium Citrate + Vit D 315mg/250 IU 2 Tablet(s) 2 Tablet(s) Oral <User Schedule>    cefepime   IVPB 1000 milliGRAM(s) IV Intermittent every 8 hours  docusate sodium 100 milliGRAM(s) Oral two times a day PRN  famotidine    Tablet 20 milliGRAM(s) Oral daily  insulin lispro (HumaLOG) corrective regimen sliding scale   SubCutaneous three times a day before meals  insulin lispro (HumaLOG) corrective regimen sliding scale   SubCutaneous at bedtime  magnesium oxide 400 milliGRAM(s) Oral two times a day with meals     metroNIDAZOLE  IVPB 500 milliGRAM(s) IV Intermittent every 8 hours  multivitamin 1 Tablet(s) Oral daily  pravastatin 20 milliGRAM(s) Oral at bedtime  predniSONE   Tablet 3 milliGRAM(s) Oral daily  sodium bicarbonate 650 milliGRAM(s) Oral two times a day  tacrolimus 0.5 milliGRAM(s) Oral QAM  tacrolimus 1 milliGRAM(s) Oral QPM  vancomycin    Solution 125 milliGRAM(s) Oral every 6 hours  voriconazole 200 milliGRAM(s) Oral every 12 hours    Vitals:  T(C): 36.6 (18 @ 05:41), Max: 36.6 (18 @ 22:30)  HR: 104 (18 @ 05:41) (104 - 109)  BP: 126/89 (18 @ 05:41) (113/82 - 126/89)  RR: 16 (18 @ 05:41) (16 - 18)  SpO2: 96% (18 @ 05:41) (94% - 96%)      Daily     Daily Weight in k.1 (25 Aug 2018 09:04)      I&O's Summary    25 Aug 2018 07:01  -  26 Aug 2018 07:00  --------------------------------------------------------  IN: 1110 mL / OUT: 1450 mL / NET: -340 mL        Physical Exam:  Appearance: No Acute Distress  HEENT: JVP <6 cm H2O, no HJR  Cardiovascular: tachy, but regular rhythm, Normal S1 S2, Holosystolic harsh blowing murmur LSB  Respiratory: Clear to auscultation bilaterally  Gastrointestinal: Soft, Non-tender, non-distended	  Skin: no skin lesions  Neurologic: Non-focal  Extremities: No LE edema, warm and well perfused  Psychiatry: A & O x 3, Mood & affect appropriate          Labs:                        9.0    4.4   )-----------( 610      ( 26 Aug 2018 06:32 )             28.9     08-26    138  |  104  |  24<H>  ----------------------------<  99  5.1   |  24  |  1.24    Ca    9.2      26 Aug 2018 06:32  Mg     2.0     -    Tacrolimus trough 10.3

## 2018-08-26 NOTE — PROGRESS NOTE ADULT - PROBLEM SELECTOR PLAN 4
- modest reduction in renal function. No labs drawn today.  - check BMP and magnesium level tomorrow  - avoid nephrotoxins - stable, but with mild hyperkalemia today.  - low K diet  - avoid nephrotoxins

## 2018-08-26 NOTE — PROGRESS NOTE ADULT - ASSESSMENT
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 Also complains of decreased PO intake for the last few days.  Denies nausea, vomiting or diarrhea. Of note right wrist is slightly tender, warm to touch w/ fluctuance, has had wrist swelling on previous admission with negative workup. Temp in clinic was 102 - most likely secondary to recurrent PNA based on new productive cough x 3 days and RUL infiltrate on CT scan.  8/14 Called by lab> arthrocentesis fluid sent for cell count> state not enough fluid for count  8/15 Still febrile, Tmax 101.8. Sputum Cx with mod gram neg rods. Unable to broaden cefepime- pt with hx of colonized carbapenem pseudomonas per ID. IR consulted to evaluate for transthoracic bx of R lung  8/16 CT guided biopsy of Right lung scheduled for Friday with Dr. Alfred, continued on IV antibiotics. VSS, Pt afebrile Tmax 98.4. Tacro level 11.5  8/17 awaiting Rt Lung bx, afebrile tacro level 10.6 valcyte decreased to every other day to start 8/19.   8/18 VVS; Right lung bx: Gram Stain: polymorphonuclear leukocytes seen. No organisms seen. Prednisone decreased to 3 mg PO daily as per CHF 2/2 infectious process. Continue with current medication regimen.   8/19 vvs: awaiting final results of Cardiac biopsy.  Tacro level 7.7 today   8/20 Pt is feeling better today, seen OOB in chair. Cardiac biopsy results still pending.  tacro level 8.0 today.  pt is afebrile but remains leukopenic. WBC 2.7   8/21 VVS; (+) loose stools. (+) Cdif --> Continue on PO vanco and flagyl IV per ID. Continue with current medication regimen. Start Nepro 1 can daily.  Check CMV PCR level in am. Awaiting lung bx cultures.   8/22    vss    awaiting  lab results including cmv   and rt wrist tap,   fungal cx pending  from rll infiltrate   8/23 VSS BM x 1 today- more formed in consistency. Per ID-continue C-diff abx -Vanco and flagyl x 14 days until 9/1. Repeat CT scan early next week w/ possible biopsy  8/25 VSS reports improved BM more formed c/w Vancomycin and flagyl until 9/1 Chest  CT scan for Monday Tacro level 8.1MG level in am   8/26 VSS improve dBM formed CT Scan in am Tacro level 10.3 MG 2.0

## 2018-08-26 NOTE — PROGRESS NOTE ADULT - PROBLEM SELECTOR PLAN 2
Immunosuppression prophylaxis:  Tacrolimus 0.5/1. Level 8.1 today (goal 8-10).  CellCept on hold due to leukopenia and infection.   Prednisone 3 mg po daily (since 8/19). Would further taper to 2 mg po daily on 9/10/18.    Antimicrobial prophylaxis:  Intermediate risk CMV - Valcyte held due to leukopenia. He is 6 months post-transplant so will monitor CMV PCR with monthly labs and not resume.  Intermediate risk Toxo - continue Mepron 1500 mg po daily  PCP prophy - covered by Mepron.  Hep C+ - he completed a course of Mayvret with undetectable Hep C virus now. Will need follow-up increased risk donor testing at 12 months post-OHT.    Other prophylaxis:  Pepcid 20 mg po daily  ECASA 81 mg po daily  Citracal + D 2 tabs po BID  Pravachol 20 mg po QHS Immunosuppression prophylaxis:  Tacrolimus 0.5/1. Level pending today (goal 8-10).  CellCept on hold due to leukopenia and infection.   Prednisone 3 mg po daily (since 8/19). Would further taper to 2 mg po daily on 9/10/18.    Antimicrobial prophylaxis:  Intermediate risk CMV - Valcyte held due to leukopenia. He is 6 months post-transplant so will monitor CMV PCR with monthly labs and not resume.  Intermediate risk Toxo - continue Mepron 1500 mg po daily  PCP prophy - covered by Mepron.  Hep C+ - he completed a course of Mayvret with undetectable Hep C virus now. Will need follow-up increased risk donor testing at 12 months post-OHT.    Other prophylaxis:  Pepcid 20 mg po daily  ECASA 81 mg po daily  Citracal + D 2 tabs po BID  Pravachol 20 mg po QHS Immunosuppression prophylaxis:  Tacrolimus 0.5/1. Level therapeutic at 10.3 (goal 8-10). No changes.  CellCept on hold due to leukopenia and infection.   Prednisone 3 mg po daily (since 8/19). Would further taper to 2 mg po daily on 9/10/18.    Antimicrobial prophylaxis:  Intermediate risk CMV - Valcyte held due to leukopenia. He is 6 months post-transplant so will monitor CMV PCR with monthly labs and not resume.  Intermediate risk Toxo - continue Mepron 1500 mg po daily  PCP prophy - covered by Mepron.  Hep C+ - he completed a course of Mayvret with undetectable Hep C virus now. Will need follow-up increased risk donor testing at 12 months post-OHT.    Other prophylaxis:  Pepcid 20 mg po daily  ECASA 81 mg po daily  Citracal + D 2 tabs po BID  Pravachol 20 mg po QHS

## 2018-08-26 NOTE — PROGRESS NOTE ADULT - PROBLEM SELECTOR PLAN 5
- likely due to CNI. Continue sodium bicarbonate 650 mg po BID. - due to CNI. Continue sodium bicarbonate 650 mg po BID.

## 2018-08-27 LAB
ANION GAP SERPL CALC-SCNC: 12 MMOL/L — SIGNIFICANT CHANGE UP (ref 5–17)
BUN SERPL-MCNC: 27 MG/DL — HIGH (ref 7–23)
CALCIUM SERPL-MCNC: 9.1 MG/DL — SIGNIFICANT CHANGE UP (ref 8.4–10.5)
CHLORIDE SERPL-SCNC: 106 MMOL/L — SIGNIFICANT CHANGE UP (ref 96–108)
CO2 SERPL-SCNC: 21 MMOL/L — LOW (ref 22–31)
CREAT SERPL-MCNC: 1.16 MG/DL — SIGNIFICANT CHANGE UP (ref 0.5–1.3)
GLUCOSE BLDC GLUCOMTR-MCNC: 108 MG/DL — HIGH (ref 70–99)
GLUCOSE BLDC GLUCOMTR-MCNC: 112 MG/DL — HIGH (ref 70–99)
GLUCOSE BLDC GLUCOMTR-MCNC: 113 MG/DL — HIGH (ref 70–99)
GLUCOSE BLDC GLUCOMTR-MCNC: 165 MG/DL — HIGH (ref 70–99)
GLUCOSE SERPL-MCNC: 91 MG/DL — SIGNIFICANT CHANGE UP (ref 70–99)
HCT VFR BLD CALC: 27.1 % — LOW (ref 39–50)
HGB BLD-MCNC: 8.5 G/DL — LOW (ref 13–17)
MCHC RBC-ENTMCNC: 29.5 PG — SIGNIFICANT CHANGE UP (ref 27–34)
MCHC RBC-ENTMCNC: 31.4 GM/DL — LOW (ref 32–36)
MCV RBC AUTO: 93.7 FL — SIGNIFICANT CHANGE UP (ref 80–100)
PLATELET # BLD AUTO: 585 K/UL — HIGH (ref 150–400)
POTASSIUM SERPL-MCNC: 5.3 MMOL/L — SIGNIFICANT CHANGE UP (ref 3.5–5.3)
POTASSIUM SERPL-SCNC: 5.3 MMOL/L — SIGNIFICANT CHANGE UP (ref 3.5–5.3)
RBC # BLD: 2.9 M/UL — LOW (ref 4.2–5.8)
RBC # FLD: 21 % — HIGH (ref 10.3–14.5)
SODIUM SERPL-SCNC: 139 MMOL/L — SIGNIFICANT CHANGE UP (ref 135–145)
TACROLIMUS SERPL-MCNC: 8.9 NG/ML — SIGNIFICANT CHANGE UP
WBC # BLD: 5.6 K/UL — SIGNIFICANT CHANGE UP (ref 3.8–10.5)
WBC # FLD AUTO: 5.6 K/UL — SIGNIFICANT CHANGE UP (ref 3.8–10.5)

## 2018-08-27 PROCEDURE — 99232 SBSQ HOSP IP/OBS MODERATE 35: CPT

## 2018-08-27 PROCEDURE — 90832 PSYTX W PT 30 MINUTES: CPT

## 2018-08-27 PROCEDURE — 99233 SBSQ HOSP IP/OBS HIGH 50: CPT

## 2018-08-27 PROCEDURE — 71250 CT THORAX DX C-: CPT | Mod: 26

## 2018-08-27 RX ADMIN — Medication 125 MILLIGRAM(S): at 23:11

## 2018-08-27 RX ADMIN — Medication 2: at 11:56

## 2018-08-27 RX ADMIN — Medication 125 MILLIGRAM(S): at 06:59

## 2018-08-27 RX ADMIN — TACROLIMUS 0.5 MILLIGRAM(S): 5 CAPSULE ORAL at 08:04

## 2018-08-27 RX ADMIN — Medication 650 MILLIGRAM(S): at 06:59

## 2018-08-27 RX ADMIN — Medication 125 MILLIGRAM(S): at 17:27

## 2018-08-27 RX ADMIN — CEFEPIME 100 MILLIGRAM(S): 1 INJECTION, POWDER, FOR SOLUTION INTRAMUSCULAR; INTRAVENOUS at 21:02

## 2018-08-27 RX ADMIN — VORICONAZOLE 200 MILLIGRAM(S): 10 INJECTION, POWDER, LYOPHILIZED, FOR SOLUTION INTRAVENOUS at 06:59

## 2018-08-27 RX ADMIN — Medication 20 MILLIGRAM(S): at 21:01

## 2018-08-27 RX ADMIN — MAGNESIUM OXIDE 400 MG ORAL TABLET 400 MILLIGRAM(S): 241.3 TABLET ORAL at 07:59

## 2018-08-27 RX ADMIN — TACROLIMUS 1 MILLIGRAM(S): 5 CAPSULE ORAL at 20:01

## 2018-08-27 RX ADMIN — Medication 100 MILLIGRAM(S): at 06:59

## 2018-08-27 RX ADMIN — MAGNESIUM OXIDE 400 MG ORAL TABLET 400 MILLIGRAM(S): 241.3 TABLET ORAL at 17:27

## 2018-08-27 RX ADMIN — Medication 650 MILLIGRAM(S): at 15:56

## 2018-08-27 RX ADMIN — Medication 650 MILLIGRAM(S): at 17:27

## 2018-08-27 RX ADMIN — Medication 3 MILLIGRAM(S): at 07:59

## 2018-08-27 RX ADMIN — CEFEPIME 100 MILLIGRAM(S): 1 INJECTION, POWDER, FOR SOLUTION INTRAMUSCULAR; INTRAVENOUS at 06:59

## 2018-08-27 RX ADMIN — ATOVAQUONE 1500 MILLIGRAM(S): 750 SUSPENSION ORAL at 11:55

## 2018-08-27 RX ADMIN — Medication 100 MILLIGRAM(S): at 14:27

## 2018-08-27 RX ADMIN — Medication 125 MILLIGRAM(S): at 11:56

## 2018-08-27 RX ADMIN — FAMOTIDINE 20 MILLIGRAM(S): 10 INJECTION INTRAVENOUS at 11:55

## 2018-08-27 RX ADMIN — Medication 1 TABLET(S): at 11:55

## 2018-08-27 RX ADMIN — Medication 81 MILLIGRAM(S): at 11:55

## 2018-08-27 RX ADMIN — VORICONAZOLE 200 MILLIGRAM(S): 10 INJECTION, POWDER, LYOPHILIZED, FOR SOLUTION INTRAVENOUS at 17:27

## 2018-08-27 RX ADMIN — CEFEPIME 100 MILLIGRAM(S): 1 INJECTION, POWDER, FOR SOLUTION INTRAMUSCULAR; INTRAVENOUS at 15:51

## 2018-08-27 NOTE — PROGRESS NOTE ADULT - PROBLEM SELECTOR PLAN 1
- Improving on current Abx. With decrease in size of bilateral opacities noted on repeat chest CT today.  - Per ID will continue with cefepime at 1gm Q8 through Wednesday, as well as voriconzaole 200mg PO Q12

## 2018-08-27 NOTE — PROGRESS NOTE ADULT - ASSESSMENT
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 Also complains of decreased PO intake for the last few days.  Denies nausea, vomiting or diarrhea. Of note right wrist is slightly tender, warm to touch w/ fluctuance, has had wrist swelling on previous admission with negative workup. Temp in clinic was 102 - most likely secondary to recurrent PNA based on new productive cough x 3 days and RUL infiltrate on CT scan.  8/14 Called by lab> arthrocentesis fluid sent for cell count> state not enough fluid for count  8/15 Still febrile, Tmax 101.8. Sputum Cx with mod gram neg rods. Unable to broaden cefepime- pt with hx of colonized carbapenem pseudomonas per ID. IR consulted to evaluate for transthoracic bx of R lung  8/16 CT guided biopsy of Right lung scheduled for Friday with Dr. Alfred, continued on IV antibiotics. VSS, Pt afebrile Tmax 98.4. Tacro level 11.5  8/17 awaiting Rt Lung bx, afebrile tacro level 10.6 valcyte decreased to every other day to start 8/19.   8/18 VVS; Right lung bx: Gram Stain: polymorphonuclear leukocytes seen. No organisms seen. Prednisone decreased to 3 mg PO daily as per CHF 2/2 infectious process. Continue with current medication regimen.   8/19 vvs: awaiting final results of Cardiac biopsy.  Tacro level 7.7 today   8/20 Pt is feeling better today, seen OOB in chair. Cardiac biopsy results still pending.  tacro level 8.0 today.  pt is afebrile but remains leukopenic. WBC 2.7   8/21 VVS; (+) loose stools. (+) Cdif --> Continue on PO vanco and flagyl IV per ID. Continue with current medication regimen. Start Nepro 1 can daily.  Check CMV PCR level in am. Awaiting lung bx cultures.   8/22    vss    awaiting  lab results including cmv   and rt wrist tap,   fungal cx pending  from rll infiltrate   8/23 VSS BM x 1 today- more formed in consistency. Per ID-continue C-diff abx -Vanco and flagyl x 14 days until 9/1. Repeat CT scan early next week w/ possible biopsy  8/25 VSS reports improved BM more formed c/w Vancomycin and flagyl until 9/1 Chest  CT scan for Monday Tacro level 8.1MG level in am   8/26 VSS improve dBM formed CT Scan in am Tacro level 10.3 MG 2.0  8/27 CT Chest result pending, plan for ambulation today TACRO level pending.  Pt taken off Isolation today.  is eager to go home sometime this week.

## 2018-08-27 NOTE — PROGRESS NOTE ADULT - PROBLEM SELECTOR PLAN 2
Immunosuppression prophylaxis:  Tacrolimus 0.5/1. Level therapeutic at 8.9 (goal 8-10). No changes.  CellCept on hold due to leukopenia and infection. Leukopenia improving with WBC now 5.6.  Prednisone 3 mg po daily (since 8/19). Would further taper to 2 mg po daily on 9/10/18.  Next EMB scheduled for 9/5 (last on 8/1)    Antimicrobial prophylaxis:  Intermediate risk CMV - Valcyte held due to leukopenia. He is 6 months post-transplant so will monitor CMV PCR with monthly labs and not resume.  Intermediate risk Toxo - continue Mepron 1500 mg po daily  PCP prophy - covered by Mepron.  Hep C+ - he completed a course of Mayvret with undetectable Hep C virus now. Will need follow-up increased risk donor testing at 12 months post-OHT.    Other prophylaxis:  Pepcid 20 mg po daily  ECASA 81 mg po daily  Citracal + D 2 tabs po BID  Pravachol 20 mg po QHS

## 2018-08-27 NOTE — PROGRESS NOTE ADULT - SUBJECTIVE AND OBJECTIVE BOX
INFECTIOUS DISEASES FOLLOW UP-- Sujey Lujan  [phone]    This is a follow up note for this  68yMale with  Infection due to fungus, being treated for a pneumonia with improvement in clinical sxs.  course complicated by C.diff colitis      ROS:  CONSTITUTIONAL:  No fever, good appetite  CARDIOVASCULAR:  No chest pain or palpitations  RESPIRATORY:  No dyspnea  GASTROINTESTINAL:  No nausea, vomiting,  less diarrhea, or abdominal pain  GENITOURINARY:  No dysuria  NEUROLOGIC:  No headache,     Allergies    No Known Allergies    Intolerances        ANTIBIOTICS/RELEVANT:  antimicrobials  atovaquone Suspension 1500 milliGRAM(s) Oral daily  cefepime   IVPB      cefepime   IVPB 1000 milliGRAM(s) IV Intermittent every 8 hours  vancomycin    Solution 125 milliGRAM(s) Oral every 6 hours  voriconazole 200 milliGRAM(s) Oral every 12 hours    immunologic:  tacrolimus 0.5 milliGRAM(s) Oral <User Schedule>  tacrolimus 1 milliGRAM(s) Oral <User Schedule>    OTHER:  acetaminophen    Suspension 500 milliGRAM(s) Oral every 6 hours PRN  aspirin enteric coated 81 milliGRAM(s) Oral daily  Calcium Citrate + Vit D 315mg/250 IU 2 Tablet(s) 2 Tablet(s) Oral <User Schedule>  docusate sodium 100 milliGRAM(s) Oral two times a day PRN  famotidine    Tablet 20 milliGRAM(s) Oral daily  insulin lispro (HumaLOG) corrective regimen sliding scale   SubCutaneous three times a day before meals  insulin lispro (HumaLOG) corrective regimen sliding scale   SubCutaneous at bedtime  magnesium oxide 400 milliGRAM(s) Oral two times a day with meals  multivitamin 1 Tablet(s) Oral daily  pravastatin 20 milliGRAM(s) Oral at bedtime  predniSONE   Tablet 3 milliGRAM(s) Oral <User Schedule>  sodium bicarbonate 650 milliGRAM(s) Oral two times a day  sodium chloride 0.65% Nasal 1 Spray(s) Both Nostrils three times a day      Objective:  Vital Signs Last 24 Hrs  T(C): 36.6 (27 Aug 2018 11:58), Max: 36.8 (26 Aug 2018 20:13)  T(F): 97.8 (27 Aug 2018 11:58), Max: 98.2 (26 Aug 2018 20:13)  HR: 118 (27 Aug 2018 11:58) (100 - 118)  BP: 110/84 (27 Aug 2018 11:58) (110/84 - 123/85)  BP(mean): --  RR: 18 (27 Aug 2018 11:58) (18 - 18)  SpO2: 95% (27 Aug 2018 11:58) (95% - 97%)    PHYSICAL EXAM:  Constitutional:no acute distress  Eyes:WALT, EOMI  Ear/Nose/Throat: no oral lesions, 	  Respiratory: clear BL  Cardiovascular: S1S2  Gastrointestinal:soft, (+) BS, no tenderness  Extremities:no e/e/c necrotic distal digit on right hand unchanged, arthritis on exam knees  No Lymphadenopathy  IV sites not inflammed.    LABS:                        8.5    5.6   )-----------( 585      ( 27 Aug 2018 05:24 )             27.1     08-27    139  |  106  |  27<H>  ----------------------------<  91  5.3   |  21<L>  |  1.16    Ca    9.1      27 Aug 2018 05:24  Mg     2.0     08-26            MICROBIOLOGY:      Hepatitis C Virus RNA Detection by PCR (08.22.18 @ 11:54)    Hepatitis C RNA, Log Value: NotDetec: HCV RNA Quantification by RT-PCR using Renee m2000:  Assay dynamic range:  12 IU/mL to 100,000,000 HCV RNA IU/mL  1.08 (log10) IU/mL to 8.00 (log10) IU/mL  Assay reference range: Not Detected  The results of this test should be interpreted with consideration of all  clinical and laboratory findings. A result of  "No HCV RNA Detected" does  not preclude the possibility of infection with hepatitis C virus.  In  particular, caution should be used when interpreting low level positive  results when the test is used for diagnostic purposes. LogIU/mL    Hepatitis C Virus RNA Detection by PCR: NotDetec      CMVPCR Log: NotDetec: **Due to reagent shortages, assay was performed at [facility] (see  [employer] message below).          RECENT CULTURES:      RADIOLOGY & ADDITIONAL STUDIES:    < from: CT Chest No Cont (08.27.18 @ 08:39) >  Impression: Nodular opacities in the right upper and left lower lobes   have decreased in size when compared to previous exams.    Calcified thrombus within the left subclavian vein, left brachiocephalic   vein as well as the superior vena cava.    < end of copied text >

## 2018-08-27 NOTE — PROGRESS NOTE ADULT - SUBJECTIVE AND OBJECTIVE BOX
Subjective:  - s/p chest CT  - states he feels well. Has been ambulating around unit.  - reports improvement in diarrhea with last watery bowel movement several days ago, notes stool to still be loose.  - denies fever, chills, cough, LOBATO, SOB at rest, palpitations, lightheadedness, dizziness, abdominal pain, nausea/vomitting.    Medications:  acetaminophen    Suspension 500 milliGRAM(s) Oral every 6 hours PRN  aspirin enteric coated 81 milliGRAM(s) Oral daily  atovaquone Suspension 1500 milliGRAM(s) Oral daily  Calcium Citrate + Vit D 315mg/250 IU 2 Tablet(s) 2 Tablet(s) Oral <User Schedule>  cefepime   IVPB      cefepime   IVPB 1000 milliGRAM(s) IV Intermittent every 8 hours  docusate sodium 100 milliGRAM(s) Oral two times a day PRN  famotidine    Tablet 20 milliGRAM(s) Oral daily  insulin lispro (HumaLOG) corrective regimen sliding scale   SubCutaneous three times a day before meals  insulin lispro (HumaLOG) corrective regimen sliding scale   SubCutaneous at bedtime  magnesium oxide 400 milliGRAM(s) Oral two times a day with meals  multivitamin 1 Tablet(s) Oral daily  pravastatin 20 milliGRAM(s) Oral at bedtime  predniSONE   Tablet 3 milliGRAM(s) Oral <User Schedule>  sodium bicarbonate 650 milliGRAM(s) Oral two times a day  sodium chloride 0.65% Nasal 1 Spray(s) Both Nostrils three times a day  tacrolimus 0.5 milliGRAM(s) Oral <User Schedule>  tacrolimus 1 milliGRAM(s) Oral <User Schedule>  vancomycin    Solution 125 milliGRAM(s) Oral every 6 hours  voriconazole 200 milliGRAM(s) Oral every 12 hours      Physical Exam:    Vitals:  Vital Signs Last 24 Hours  T(C): 36.6 (18 @ 11:58), Max: 36.8 (18 @ 20:13)  HR: 118 (18 @ 11:58) (100 - 118)  BP: 110/84 (18 @ 11:58) (110/84 - 123/85)  RR: 18 (18 @ 11:58) (18 - 18)  SpO2: 95% (18 @ 11:58) (95% - 97%)    Weight in k ( @ 07:10)    I&O's Summary    26 Aug 2018 07:  -  27 Aug 2018 07:00  --------------------------------------------------------  IN: 1636 mL / OUT: 2750 mL / NET: -1114 mL    27 Aug 2018 07:01  -  27 Aug 2018 19:15  --------------------------------------------------------  IN: 680 mL / OUT: 600 mL / NET: 80 mL    Tele: SR--120s    General: OOB in chair. No distress. Comfortable.  HEENT: EOM intact.  Neck: Neck supple. JVP not elevated.   Chest: Clear to auscultation bilaterally  CV: Tachycardic. Normal S1 and S2. No murmurs, rub, or gallops. Radial pulses normal. No LE edema.   Abdomen: Soft, non-distended, non-tender + BS  Skin: R third digit with small area of eschar, sensation intact, non painful  Neurology: Alert and oriented times three. Sensation intact  Psych: Affect normal    Labs:                        8.5    5.6   )-----------( 585      ( 27 Aug 2018 05:24 )             27.1         139  |  106  |  27<H>  ----------------------------<  91  5.3   |  21<L>  |  1.16    Ca    9.1      27 Aug 2018 05:24  Mg     2.0         Tacrolimus (), Serum (18 @ 17:17)    Tacrolimus (), Serum: 8.9    Tacrolimus (), Serum (18 @ 08:07)    Tacrolimus (), Serum: 10.3    < from: CT Chest No Cont (18 @ 08:39) >  INTERPRETATION:  Clinical information: Follow-up examination. Patient is   status post heart transplant. Exam is compared to previous studies of   2018 as well as 2018.    Few less than 1 cm lymph nodes are present in the pretracheal space.     Heart is normal in size. No pericardial effusion is noted. Patient is   status post sternotomy. Calcified linear filling defects are noted within   the left subclavian, left brachiocephalic veins as well as superior vena   cava.     No endobronchial lesions are noted. Compared to the previous examination,   the nodular opacity in the right upper lobe has decreased in size. On the   current examination it measures approximately 2.8 x 2.8 cm. The nodular   opacity in the left lower lobe has decreased in size when compared to   previous exams. On the current examination it measures 1.7 cm. Thick   linear opacities likely representing atelectasis/scarring are noted   within the right middle lobe and right lower lobe. Trace pleural   effusions are noted bilaterally.    Below the diaphragm, visualized portions of the abdomen demonstrate   cholelithiasis. Isodense lesion in the left kidney is incompletely   assessed on this exam.     Degenerative changes of the spine are noted.    Impression: Nodular opacities in the right upper and left lower lobes   have decreased in size when compared to previous exams.    Calcified thrombus within the left subclavian vein, left brachiocephalic   vein as well as the superior vena cava.

## 2018-08-27 NOTE — PROGRESS NOTE ADULT - ASSESSMENT
Mood "good."  Appetite good.   Sleeping well.  Ambulating in simms 2x/day and in room periodically.  Using incentive spirometer "when I remember." Encouraged him to increase use to every 2hours during day.  Tamika with hospitalization by taking with staff and watching TV.  Denies anxiety/depression but does get lonely at times. Receptive to support and validation.  Coping strategies reviewed.   Dx:  r/o Adjustment disorder; F43.20     Recommendations:    Behavioral Cardiology will follow as needed

## 2018-08-27 NOTE — PROGRESS NOTE ADULT - ASSESSMENT
68 year old man PMH NICM HFrEF s/p HM2 LVAD 6/2017, who underwent heart transplant on 2/23/18 (CMV D-/R+ and Toxo D-/R+, Hep C+ heart) with post op graft dysfunction who underwent plasmapheresis and IVIG x2 as well as rituximab, with suspected fungal PNA diagnosed 6/2018 on voriconazole who presented with fevers from outpt clinic appointment - secondary to recurrent PNA with new RUL infiltrate on CT scan - improving on broad spectrum antibiotics. S/p lung biopsy 8/18. C diff toxin positive - on oral vanco and flagyl, afebrile with improvement in diarrhea.

## 2018-08-27 NOTE — PROGRESS NOTE ADULT - SUBJECTIVE AND OBJECTIVE BOX
VITAL SIGNS    Telemetry:  NSR     Vital Signs Last 24 Hrs  T(C): 36.6 (18 @ 04:54), Max: 36.8 (18 @ 20:13)  T(F): 97.9 (18 @ 04:54), Max: 98.2 (18 @ 20:13)  HR: 100 (18 @ 04:54) (100 - 111)  BP: 123/85 (18 @ 04:54) (122/83 - 126/84)  RR: 18 (18 @ 04:54) (18 - 18)  SpO2: 97% (18 @ 04:54) (96% - 97%)                    @ 07:01  -   @ 07:00  --------------------------------------------------------  IN: 1636 mL / OUT: 2750 mL / NET: -1114 mL     @ 07:01  -   @ 10:46  --------------------------------------------------------  IN: 0 mL / OUT: 200 mL / NET: -200 mL          Daily     Daily Weight in k (27 Aug 2018 07:10)        CAPILLARY BLOOD GLUCOSE      POCT Blood Glucose.: 113 mg/dL (27 Aug 2018 07:34)  POCT Blood Glucose.: 92 mg/dL (26 Aug 2018 21:47)  POCT Blood Glucose.: 114 mg/dL (26 Aug 2018 16:27)  POCT Blood Glucose.: 96 mg/dL (26 Aug 2018 11:15)          PHYSICAL EXAM    Neurology: alert and oriented x 3, nonfocal, no gross deficits  CV :  RRR No audible murmurs  Sternal Wound :  CDI , Stable  Lungs: + Ronchi  Abdomen: soft, nontender, nondistended, positive bowel sounds, last bowel movement   :     + urination         Extremities:     	FROM X4 NO C/E/E          acetaminophen    Suspension 500 milliGRAM(s) Oral every 6 hours PRN  aspirin enteric coated 81 milliGRAM(s) Oral daily  atovaquone Suspension 1500 milliGRAM(s) Oral daily  Calcium Citrate + Vit D 315mg/250 IU 2 Tablet(s) 2 Tablet(s) Oral <User Schedule>  cefepime   IVPB      cefepime   IVPB 1000 milliGRAM(s) IV Intermittent every 8 hours  docusate sodium 100 milliGRAM(s) Oral two times a day PRN  famotidine    Tablet 20 milliGRAM(s) Oral daily  insulin lispro (HumaLOG) corrective regimen sliding scale   SubCutaneous three times a day before meals  insulin lispro (HumaLOG) corrective regimen sliding scale   SubCutaneous at bedtime  magnesium oxide 400 milliGRAM(s) Oral two times a day with meals  metroNIDAZOLE  IVPB      metroNIDAZOLE  IVPB 500 milliGRAM(s) IV Intermittent every 8 hours  multivitamin 1 Tablet(s) Oral daily  pravastatin 20 milliGRAM(s) Oral at bedtime  predniSONE   Tablet 3 milliGRAM(s) Oral <User Schedule>  sodium bicarbonate 650 milliGRAM(s) Oral two times a day  sodium chloride 0.65% Nasal 1 Spray(s) Both Nostrils three times a day  tacrolimus 0.5 milliGRAM(s) Oral <User Schedule>  tacrolimus 1 milliGRAM(s) Oral <User Schedule>  vancomycin    Solution 125 milliGRAM(s) Oral every 6 hours  voriconazole 200 milliGRAM(s) Oral every 12 hours                    Physical Therapy Rec:   Home  [x ]   Home w/ PT  [  ]  Rehab  [  ]  Discussed with Cardiothoracic Team at AM rounds.

## 2018-08-27 NOTE — PROGRESS NOTE ADULT - ASSESSMENT
67 yo M s/p heart transplant complicated by rejection with rounds of plasmaphoresis , IVIG, rituximab x2, known colonizer with  Pseudomonas last treated for PNA in June 2018 with cefepime and voriconazole now presents with fever, decreased PO intake, productive cough. Pt had CT guided R lung mass bx on 8/17. Ddx include recurrent pneumonia vs gout flare  vs other infectious process.     Fungitell negative  Galactomannan negative  Crypto Ag negative  Quant indeterminate, however lung biopsy w negative AFB stain  Pathology report from 8/17 R lung bx showing inflammation w negative AFB and GMS stain  CT guided biopsy cultures are NGTD- only grew a corynebacterium that is likely not a pathogen  Hep C neg (8/22)    Recommend:   - CT chest on 8/14 suspicious for fungal infection, serologies have been negative and pt. clinically improved. Most recent CT scan of lungs shows decrease in size of nodules  - f/u CMV PCR to be sent again this week  - Continue Cefepime and Voriconazole for pna  - C.diff- diarrhea improving with oral Vanco/IV flagyl discontinued today    Will plan to complete IV cefepime x 14 days and discharge home on oral Voriconazole  nystatin, mepron  repeat CMV viral load    Gary Lujan MD  [phone]  After 5pm/weekends [phone]    - OI prophylaxis with Mepron; s/p 6 months of Valganciclovir

## 2018-08-28 LAB
ANION GAP SERPL CALC-SCNC: 11 MMOL/L — SIGNIFICANT CHANGE UP (ref 5–17)
BUN SERPL-MCNC: 29 MG/DL — HIGH (ref 7–23)
CALCIUM SERPL-MCNC: 9.3 MG/DL — SIGNIFICANT CHANGE UP (ref 8.4–10.5)
CHLORIDE SERPL-SCNC: 105 MMOL/L — SIGNIFICANT CHANGE UP (ref 96–108)
CO2 SERPL-SCNC: 24 MMOL/L — SIGNIFICANT CHANGE UP (ref 22–31)
CREAT SERPL-MCNC: 1.21 MG/DL — SIGNIFICANT CHANGE UP (ref 0.5–1.3)
GLUCOSE BLDC GLUCOMTR-MCNC: 103 MG/DL — HIGH (ref 70–99)
GLUCOSE BLDC GLUCOMTR-MCNC: 104 MG/DL — HIGH (ref 70–99)
GLUCOSE BLDC GLUCOMTR-MCNC: 105 MG/DL — HIGH (ref 70–99)
GLUCOSE BLDC GLUCOMTR-MCNC: 111 MG/DL — HIGH (ref 70–99)
GLUCOSE SERPL-MCNC: 96 MG/DL — SIGNIFICANT CHANGE UP (ref 70–99)
HCT VFR BLD CALC: 27.9 % — LOW (ref 39–50)
HGB BLD-MCNC: 8.5 G/DL — LOW (ref 13–17)
MCHC RBC-ENTMCNC: 28.5 PG — SIGNIFICANT CHANGE UP (ref 27–34)
MCHC RBC-ENTMCNC: 30.6 GM/DL — LOW (ref 32–36)
MCV RBC AUTO: 93.4 FL — SIGNIFICANT CHANGE UP (ref 80–100)
PLATELET # BLD AUTO: 561 K/UL — HIGH (ref 150–400)
POTASSIUM SERPL-MCNC: 5.3 MMOL/L — SIGNIFICANT CHANGE UP (ref 3.5–5.3)
POTASSIUM SERPL-SCNC: 5.3 MMOL/L — SIGNIFICANT CHANGE UP (ref 3.5–5.3)
RBC # BLD: 2.99 M/UL — LOW (ref 4.2–5.8)
RBC # FLD: 21 % — HIGH (ref 10.3–14.5)
SODIUM SERPL-SCNC: 140 MMOL/L — SIGNIFICANT CHANGE UP (ref 135–145)
TACROLIMUS SERPL-MCNC: 8.2 NG/ML — SIGNIFICANT CHANGE UP
WBC # BLD: 4.6 K/UL — SIGNIFICANT CHANGE UP (ref 3.8–10.5)
WBC # FLD AUTO: 4.6 K/UL — SIGNIFICANT CHANGE UP (ref 3.8–10.5)

## 2018-08-28 PROCEDURE — 99233 SBSQ HOSP IP/OBS HIGH 50: CPT

## 2018-08-28 PROCEDURE — 99232 SBSQ HOSP IP/OBS MODERATE 35: CPT | Mod: GC

## 2018-08-28 RX ORDER — TACROLIMUS 5 MG/1
1 CAPSULE ORAL
Qty: 0 | Refills: 0 | COMMUNITY

## 2018-08-28 RX ORDER — TACROLIMUS 5 MG/1
1 CAPSULE ORAL
Qty: 30 | Refills: 1 | OUTPATIENT
Start: 2018-08-28 | End: 2018-10-26

## 2018-08-28 RX ORDER — SODIUM POLYSTYRENE SULFONATE 4.1 MEQ/G
1 POWDER, FOR SUSPENSION ORAL
Qty: 0 | Refills: 0 | COMMUNITY

## 2018-08-28 RX ORDER — DOCUSATE SODIUM 100 MG
2 CAPSULE ORAL
Qty: 0 | Refills: 0 | COMMUNITY

## 2018-08-28 RX ORDER — PREDNISONE 1 MG/1
1 TABLET ORAL DAILY
Qty: 120 | Refills: 1 | Status: COMPLETED | COMMUNITY
Start: 2018-08-03 | End: 2018-08-28

## 2018-08-28 RX ORDER — REPAGLINIDE 1 MG/1
1 TABLET ORAL
Qty: 0 | Refills: 0 | COMMUNITY

## 2018-08-28 RX ORDER — VANCOMYCIN HCL 1 G
1 VIAL (EA) INTRAVENOUS
Qty: 28 | Refills: 0 | OUTPATIENT
Start: 2018-08-28 | End: 2018-09-10

## 2018-08-28 RX ORDER — ACETAMINOPHEN 500 MG
650 TABLET ORAL ONCE
Qty: 0 | Refills: 0 | Status: COMPLETED | OUTPATIENT
Start: 2018-08-28 | End: 2018-08-28

## 2018-08-28 RX ORDER — VALGANCICLOVIR HYDROCHLORIDE 450 MG/1
450 TABLET ORAL
Qty: 60 | Refills: 5 | Status: COMPLETED | COMMUNITY
Start: 2018-04-12 | End: 2018-08-28

## 2018-08-28 RX ORDER — SITAGLIPTIN 50 MG/1
1 TABLET, FILM COATED ORAL
Qty: 0 | Refills: 0 | COMMUNITY

## 2018-08-28 RX ADMIN — ATOVAQUONE 1500 MILLIGRAM(S): 750 SUSPENSION ORAL at 11:25

## 2018-08-28 RX ADMIN — Medication 650 MILLIGRAM(S): at 08:18

## 2018-08-28 RX ADMIN — Medication 1 TABLET(S): at 11:25

## 2018-08-28 RX ADMIN — MAGNESIUM OXIDE 400 MG ORAL TABLET 400 MILLIGRAM(S): 241.3 TABLET ORAL at 17:33

## 2018-08-28 RX ADMIN — VORICONAZOLE 200 MILLIGRAM(S): 10 INJECTION, POWDER, LYOPHILIZED, FOR SOLUTION INTRAVENOUS at 17:34

## 2018-08-28 RX ADMIN — Medication 81 MILLIGRAM(S): at 11:25

## 2018-08-28 RX ADMIN — Medication 650 MILLIGRAM(S): at 17:34

## 2018-08-28 RX ADMIN — CEFEPIME 100 MILLIGRAM(S): 1 INJECTION, POWDER, FOR SOLUTION INTRAMUSCULAR; INTRAVENOUS at 13:46

## 2018-08-28 RX ADMIN — Medication 3 MILLIGRAM(S): at 08:01

## 2018-08-28 RX ADMIN — Medication 125 MILLIGRAM(S): at 11:25

## 2018-08-28 RX ADMIN — VORICONAZOLE 200 MILLIGRAM(S): 10 INJECTION, POWDER, LYOPHILIZED, FOR SOLUTION INTRAVENOUS at 06:10

## 2018-08-28 RX ADMIN — TACROLIMUS 1 MILLIGRAM(S): 5 CAPSULE ORAL at 20:05

## 2018-08-28 RX ADMIN — CEFEPIME 100 MILLIGRAM(S): 1 INJECTION, POWDER, FOR SOLUTION INTRAMUSCULAR; INTRAVENOUS at 22:29

## 2018-08-28 RX ADMIN — Medication 20 MILLIGRAM(S): at 22:29

## 2018-08-28 RX ADMIN — Medication 650 MILLIGRAM(S): at 08:48

## 2018-08-28 RX ADMIN — Medication 125 MILLIGRAM(S): at 23:45

## 2018-08-28 RX ADMIN — Medication 125 MILLIGRAM(S): at 17:33

## 2018-08-28 RX ADMIN — CEFEPIME 100 MILLIGRAM(S): 1 INJECTION, POWDER, FOR SOLUTION INTRAMUSCULAR; INTRAVENOUS at 06:11

## 2018-08-28 RX ADMIN — Medication 650 MILLIGRAM(S): at 06:10

## 2018-08-28 RX ADMIN — TACROLIMUS 0.5 MILLIGRAM(S): 5 CAPSULE ORAL at 08:01

## 2018-08-28 RX ADMIN — FAMOTIDINE 20 MILLIGRAM(S): 10 INJECTION INTRAVENOUS at 11:25

## 2018-08-28 RX ADMIN — MAGNESIUM OXIDE 400 MG ORAL TABLET 400 MILLIGRAM(S): 241.3 TABLET ORAL at 08:01

## 2018-08-28 RX ADMIN — Medication 125 MILLIGRAM(S): at 06:10

## 2018-08-28 NOTE — CHART NOTE - NSCHARTNOTEFT_GEN_A_CORE
Source: Patient [ X]    Family [ ]     other [ X] RN, Medical record     Pt seen for nutritional follow up. Pt seen, reports feeling well states he's going home tomorrow. Pt reports a good PO intake and states improvement in diarrhea. Pt states last BM was today and was normal. Pt denies education review, only asked if BG levels were acceptable. Reviewed Pt's fingerstick with him and Pt denies further nutritional questions at this time.     Chart reviewed, events noted. Pt admitted for Fevers, Pt with PMHx of Heart Transplant. Per chart Pt with SIRS, PNA and pseudomonas. CT chest with improved lesions, and no diarrhea.     Diet: Consistent CHO, Neprox1       Patient reports no GI distress      PO intake: %     Source for PO intake [ X] Patient     Current Weight: 71.4kg, fluctuations likely related to fluids   Admission Wt: 78kg   % Weight Change    Pertinent Medications: MEDICATIONS  (STANDING):  aspirin enteric coated 81 milliGRAM(s) Oral daily  atovaquone Suspension 1500 milliGRAM(s) Oral daily  Calcium Citrate + Vit D 315mg/250 IU 2 Tablet(s) 2 Tablet(s) Oral <User Schedule>  cefepime   IVPB      cefepime   IVPB 1000 milliGRAM(s) IV Intermittent every 8 hours  famotidine    Tablet 20 milliGRAM(s) Oral daily  insulin lispro (HumaLOG) corrective regimen sliding scale   SubCutaneous three times a day before meals  insulin lispro (HumaLOG) corrective regimen sliding scale   SubCutaneous at bedtime  magnesium oxide 400 milliGRAM(s) Oral two times a day with meals  multivitamin 1 Tablet(s) Oral daily  pravastatin 20 milliGRAM(s) Oral at bedtime  predniSONE   Tablet 3 milliGRAM(s) Oral <User Schedule>  sodium bicarbonate 650 milliGRAM(s) Oral two times a day  sodium chloride 0.65% Nasal 1 Spray(s) Both Nostrils three times a day  tacrolimus 0.5 milliGRAM(s) Oral <User Schedule>  tacrolimus 1 milliGRAM(s) Oral <User Schedule>  vancomycin    Solution 125 milliGRAM(s) Oral every 6 hours  voriconazole 200 milliGRAM(s) Oral every 12 hours    MEDICATIONS  (PRN):  acetaminophen    Suspension 500 milliGRAM(s) Oral every 6 hours PRN For Temp greater than 38.5 C (101.3 F)  docusate sodium 100 milliGRAM(s) Oral two times a day PRN Constipation    Pertinent Labs:  Hgb/Hct:8.5/27.9-low, Na:140, K:5.3, BUN:29-high, Cr:1.21, Glucose:96, Ca:9.3, BG levels; 8/28: 104, 8/27: 108-165      Skin: intact     Estimated Needs:   [X ] no change since previous assessment  [ ] recalculated:       Previous Nutrition Diagnosis:     [X ]Inadequate Oral Intake          Nutrition Diagnosis is [X ] ongoing  [ ] resolved [ ] not applicable          New Nutrition Diagnosis: [X] not applicable      Interventions:     Recommend    1. Provide food preferences as requested by Pt/family within diet restrictions    2. Encourage PO intake during meal times   3. Reviewed menu ordering procedures   4. Trend BG levels, renal indices, LFT's and electrolytes        Monitoring and Evaluation:     [ X] PO intake [ X] Tolerance to diet prescription [X ] weights [X ] follow up per protocol    [ X] other: RD remains available Sarah Siegler RD, [facility] Pager #[phone] Source: Patient [ X]    Family [ ]     other [ X] RN, Medical record     Pt seen for nutritional follow up. Pt seen, reports feeling well states he's going home tomorrow. Pt reports a good PO intake and states improvement in diarrhea. Pt states last BM was today and was normal. Pt denies education review, only asked if BG levels were acceptable. Reviewed Pt's fingerstick with him and Pt denies further nutritional questions at this time.     Chart reviewed, events noted. Pt admitted for Fevers, Pt with PMHx of Heart Transplant. Per chart Pt with SIRS, PNA and pseudomonas. CT chest with improved lesions, and no diarrhea.     Diet: Consistent CHO, Neprox1       Patient reports no GI distress      PO intake: %     Source for PO intake [ X] Patient     Current Weight: 71.4kg, fluctuations likely related to fluids   Admission Wt: 78kg   % Weight Change    Pertinent Medications: MEDICATIONS  (STANDING):  aspirin enteric coated 81 milliGRAM(s) Oral daily  atovaquone Suspension 1500 milliGRAM(s) Oral daily  Calcium Citrate + Vit D 315mg/250 IU 2 Tablet(s) 2 Tablet(s) Oral <User Schedule>  cefepime   IVPB      cefepime   IVPB 1000 milliGRAM(s) IV Intermittent every 8 hours  famotidine    Tablet 20 milliGRAM(s) Oral daily  insulin lispro (HumaLOG) corrective regimen sliding scale   SubCutaneous three times a day before meals  insulin lispro (HumaLOG) corrective regimen sliding scale   SubCutaneous at bedtime  magnesium oxide 400 milliGRAM(s) Oral two times a day with meals  multivitamin 1 Tablet(s) Oral daily  pravastatin 20 milliGRAM(s) Oral at bedtime  predniSONE   Tablet 3 milliGRAM(s) Oral <User Schedule>  sodium bicarbonate 650 milliGRAM(s) Oral two times a day  sodium chloride 0.65% Nasal 1 Spray(s) Both Nostrils three times a day  tacrolimus 0.5 milliGRAM(s) Oral <User Schedule>  tacrolimus 1 milliGRAM(s) Oral <User Schedule>  vancomycin    Solution 125 milliGRAM(s) Oral every 6 hours  voriconazole 200 milliGRAM(s) Oral every 12 hours    MEDICATIONS  (PRN):  acetaminophen    Suspension 500 milliGRAM(s) Oral every 6 hours PRN For Temp greater than 38.5 C (101.3 F)  docusate sodium 100 milliGRAM(s) Oral two times a day PRN Constipation    Pertinent Labs:  Hgb/Hct:8.5/27.9-low, Na:140, K:5.3, BUN:29-high, Cr:1.21, Glucose:96, Ca:9.3, BG levels; 8/28: 104, 8/27: 108-165      Skin: intact     Estimated Needs:   [X ] no change since previous assessment  [ ] recalculated:       Previous Nutrition Diagnosis:     [X ]Inadequate Oral Intake          Nutrition Diagnosis is [X ] ongoing  [ ] resolved [ ] not applicable          New Nutrition Diagnosis: [X] not applicable      Interventions:     Recommend    1. Provide food preferences as requested by Pt/family within diet restrictions    2. Encourage PO intake during meal times   3. Reviewed menu ordering procedures  4. Recommend to change diet to Consistent CHO, no concentrated K, Nepro x1  5. Trend BG levels, renal indices, LFT's and electrolytes        Monitoring and Evaluation:     [ X] PO intake [ X] Tolerance to diet prescription [X ] weights [X ] follow up per protocol    [ X] other: RD remains available Sarah Siegler RD, [facility] Pager #[phone]

## 2018-08-28 NOTE — PROGRESS NOTE ADULT - SUBJECTIVE AND OBJECTIVE BOX
Follow Up:  67 yo M s/p heart transplant complicated by rejection with rounds of plasmaphoresis , IVIG, rituximab x2, known colonizer with  Pseudomonas last treated for PNA in June 2018 with cefepime and voriconazole admitted for pneumonia is seen for follow up.     Interval History/ROS:  No new events.  Patient appears clinically improved     Allergies  No Known Allergies        ANTIMICROBIALS:  atovaquone Suspension 1500 daily  cefepime   IVPB    cefepime   IVPB 1000 every 8 hours  vancomycin    Solution 125 every 6 hours  voriconazole 200 every 12 hours      OTHER MEDS:  MEDICATIONS  (STANDING):  acetaminophen    Suspension 500 every 6 hours PRN  aspirin enteric coated 81 daily  docusate sodium 100 two times a day PRN  famotidine    Tablet 20 daily  insulin lispro (HumaLOG) corrective regimen sliding scale  three times a day before meals  insulin lispro (HumaLOG) corrective regimen sliding scale  at bedtime  pravastatin 20 at bedtime  predniSONE   Tablet 3 <User Schedule>  tacrolimus 0.5 <User Schedule>  tacrolimus 1 <User Schedule>      Vital Signs Last 24 Hrs  T(C): 36.7 (28 Aug 2018 12:07), Max: 36.9 (27 Aug 2018 20:47)  T(F): 98.1 (28 Aug 2018 12:07), Max: 98.5 (27 Aug 2018 20:47)  HR: 56 (28 Aug 2018 12:07) (56 - 110)  BP: 110/73 (28 Aug 2018 12:07) (110/73 - 119/84)  BP(mean): --  RR: 18 (28 Aug 2018 12:07) (18 - 18)  SpO2: 96% (28 Aug 2018 12:07) (96% - 98%)    PHYSICAL EXAM:  General: non-toxic  HEAD/EYES: anicteric, PERRL  ENT:  supple  Cardiovascular:   S1, S2  Respiratory:  clear bilaterally  GI:  soft, non-tender, normal bowel sounds  :  no CVA tenderness   Musculoskeletal:  no synovitis  Neurologic:  grossly non-focal  Skin:  no rash  Lymph: no lymphadenopathy  Psychiatric:  appropriate affect  Vascular:  no phlebitis                                8.5    4.6   )-----------( 561      ( 28 Aug 2018 07:37 )             27.9       08-28    140  |  105  |  29<H>  ----------------------------<  96  5.3   |  24  |  1.21    Ca    9.3      28 Aug 2018 07:37            MICROBIOLOGY:  v  .Body Fluid Lung - Upper Lobe Right  08-17-18   Few Corynebacterium species  "Susceptibilities not performed"  --    polymorphonuclear leukocytes seen  No organisms seen  by cytocentrifuge      .Urine Clean Catch (Midstream)  08-15-18   No growth  --  --      .Blood Blood-Peripheral  08-14-18   No growth at 5 days.  --  --      .Abscess Arm - Right  08-14-18   No growth at 5 days  --  --      .Sputum Sputum  08-14-18   Normal Respiratory Heaven present  --    Rare polymorphonuclear leukocytes per low power field  Rare Squamous epithelial cells per low power field  Moderate Gram Negative Rods per oil power field        Toxoplasma IgG Screen: 4.7 IU/mL (06-14-18 @ 09:42)  HIV-1 RNA Quantitative, Viral Load Log: NOT DET. lg /mL (05-31-18 @ 19:10)  CMV IgG Antibody: 5.40 U/mL (05-25-18 @ 17:51)  CMV IgG Antibody: >10.00 U/mL (02-22-18 @ 23:45)  HIV-1 RNA Quantitative, Viral Load Log: NOT DET. lg /mL (02-02-18 @ 19:25)    CMVPCR Log: NotDetec LogIU/mL (08-21 @ 20:39)        RADIOLOGY:

## 2018-08-28 NOTE — PROGRESS NOTE ADULT - SUBJECTIVE AND OBJECTIVE BOX
I am ready to get outta here    VITAL SIGNS    Telemetry:  SR 90    Vital Signs Last 24 Hrs  T(C): 36.7 (18 @ 12:07), Max: 36.9 (18 @ 20:47)  T(F): 98.1 (18 @ 12:07), Max: 98.5 (18 @ 20:47)  HR: 56 (18 @ 12:07) (56 - 110)  BP: 110/73 (18 @ 12:07) (110/73 - 119/84)  RR: 18 (18 @ 12:07) (18 - 18)  SpO2: 96% (18 @ 12:07) (96% - 98%)                    @ 07:01  -   @ 07:00  --------------------------------------------------------  IN: 1020 mL / OUT: 1025 mL / NET: -5 mL     @ 07:01  -   @ 13:36  --------------------------------------------------------  IN: 440 mL / OUT: 0 mL / NET: 440 mL          Daily     Daily Weight in k.4 (28 Aug 2018 08:08)        CAPILLARY BLOOD GLUCOSE      POCT Blood Glucose.: 103 mg/dL (28 Aug 2018 11:24)  POCT Blood Glucose.: 104 mg/dL (28 Aug 2018 07:27)  POCT Blood Glucose.: 112 mg/dL (27 Aug 2018 21:55)  POCT Blood Glucose.: 108 mg/dL (27 Aug 2018 16:41)          PHYSICAL EXAM    Neurology: alert and oriented x 3, nonfocal, no gross deficits  CV : RRR NO AUDIBLE MURMURS  Sternal Wound :  CDI , Stable  Lungs: +RONCHI  Abdomen: soft, nontender, nondistended, positive bowel sounds, last bowel movement 8/28  :    +Urination         Extremities:     FROMX 4 NO C/E/E        acetaminophen    Suspension 500 milliGRAM(s) Oral every 6 hours PRN  aspirin enteric coated 81 milliGRAM(s) Oral daily  atovaquone Suspension 1500 milliGRAM(s) Oral daily  Calcium Citrate + Vit D 315mg/250 IU 2 Tablet(s) 2 Tablet(s) Oral <User Schedule>  cefepime   IVPB      cefepime   IVPB 1000 milliGRAM(s) IV Intermittent every 8 hours  docusate sodium 100 milliGRAM(s) Oral two times a day PRN  famotidine    Tablet 20 milliGRAM(s) Oral daily  insulin lispro (HumaLOG) corrective regimen sliding scale   SubCutaneous three times a day before meals  insulin lispro (HumaLOG) corrective regimen sliding scale   SubCutaneous at bedtime  magnesium oxide 400 milliGRAM(s) Oral two times a day with meals  multivitamin 1 Tablet(s) Oral daily  pravastatin 20 milliGRAM(s) Oral at bedtime  predniSONE   Tablet 3 milliGRAM(s) Oral <User Schedule>  sodium bicarbonate 650 milliGRAM(s) Oral two times a day  sodium chloride 0.65% Nasal 1 Spray(s) Both Nostrils three times a day  tacrolimus 0.5 milliGRAM(s) Oral <User Schedule>  tacrolimus 1 milliGRAM(s) Oral <User Schedule>  vancomycin    Solution 125 milliGRAM(s) Oral every 6 hours  voriconazole 200 milliGRAM(s) Oral every 12 hours                    Physical Therapy Rec:   Home  [X  ]   Home w/ PT  [  ]  Rehab  [  ]  Discussed with Cardiothoracic Team at AM rounds.

## 2018-08-28 NOTE — PROGRESS NOTE ADULT - SUBJECTIVE AND OBJECTIVE BOX
Subjective:  - No acute events overnight  - Reports ongoing improvement in diarrhea, BM loose this AM, not watery per pt's report  - Ambulating around nursing unit several times a day without LOBATO. Sleeping with 2 pillows, stable. Denies PND, CP, palpitations, lightheadedness, dizziness, fatigue, fevers, chills, abdominal pain, nausea.    Medications:  acetaminophen    Suspension 500 milliGRAM(s) Oral every 6 hours PRN  aspirin enteric coated 81 milliGRAM(s) Oral daily  atovaquone Suspension 1500 milliGRAM(s) Oral daily  Calcium Citrate + Vit D 315mg/250 IU 2 Tablet(s) 2 Tablet(s) Oral <User Schedule>  cefepime   IVPB      cefepime   IVPB 1000 milliGRAM(s) IV Intermittent every 8 hours  docusate sodium 100 milliGRAM(s) Oral two times a day PRN  famotidine    Tablet 20 milliGRAM(s) Oral daily  insulin lispro (HumaLOG) corrective regimen sliding scale   SubCutaneous three times a day before meals  insulin lispro (HumaLOG) corrective regimen sliding scale   SubCutaneous at bedtime  magnesium oxide 400 milliGRAM(s) Oral two times a day with meals  multivitamin 1 Tablet(s) Oral daily  pravastatin 20 milliGRAM(s) Oral at bedtime  predniSONE   Tablet 3 milliGRAM(s) Oral <User Schedule>  sodium bicarbonate 650 milliGRAM(s) Oral two times a day  sodium chloride 0.65% Nasal 1 Spray(s) Both Nostrils three times a day  tacrolimus 0.5 milliGRAM(s) Oral <User Schedule>  tacrolimus 1 milliGRAM(s) Oral <User Schedule>  vancomycin    Solution 125 milliGRAM(s) Oral every 6 hours  voriconazole 200 milliGRAM(s) Oral every 12 hours    Physical Exam:    Vitals:  Vital Signs Last 24 Hours  T(C): 36.6 (18 @ 05:13), Max: 36.9 (18 @ 20:47)  HR: 105 (18 @ 05:13) (105 - 118)  BP: 116/76 (18 @ 05:13) (110/84 - 119/84)  RR: 18 (18 @ 05:13) (18 - 18)  SpO2: 98% (18 @ 05:13) (95% - 98%)    Weight in k.4 ( @ 08:08)    I&O's Summary    27 Aug 2018 07:  -  28 Aug 2018 07:00  --------------------------------------------------------  IN: 1020 mL / OUT: 1025 mL / NET: -5 mL    28 Aug 2018 07:  -  28 Aug 2018 11:31  --------------------------------------------------------  IN: 320 mL / OUT: 0 mL / NET: 320 mL    Tele: SR- -120s, briefly to 130s yesterday.    General: OOB in chair. No distress. Comfortable.  HEENT: EOM intact.  Neck: Neck supple. JVP not elevated.   Chest: Clear to auscultation bilaterally  CV: Tachycardic. Normal S1 and S2. No murmurs, rub, or gallops. Radial pulses normal. trace BLE edema.   Abdomen: Soft, non-distended, non-tender + BS  Skin: R third digit with small area of eschar, sensation intact, non painful  Neurology: Alert and oriented times three. Sensation intact  Psych: Affect normal    Labs:                        8.5    4.6   )-----------( 561      ( 28 Aug 2018 07:37 )             27.9         140  |  105  |  29<H>  ----------------------------<  96  5.3   |  24  |  1.21    Ca    9.3      28 Aug 2018 07:37    Tacrolimus (), Serum (18 @ 09:07)    Tacrolimus (), Serum: 8.2    Tacrolimus (), Serum (18 @ 17:17)    Tacrolimus (), Serum: 8.9mL

## 2018-08-28 NOTE — PROGRESS NOTE ADULT - ASSESSMENT
69 yo M s/p heart transplant complicated by rejection with rounds of plasmaphoresis , IVIG, rituximab x2, known colonizer with  Pseudomonas last treated for PNA in June 2018 with cefepime and voriconazole now presents with fever, decreased PO intake, productive cough. Pt had CT guided R lung mass bx on 8/17. Ddx include recurrent pneumonia vs gout flare  vs other infectious process.     Fungitell negative  Galactomannan negative  Crypto Ag negative  Quant indeterminate, however lung biopsy w negative AFB stain  Pathology report from 8/17 R lung bx showing inflammation w negative AFB and GMS stain  CT guided biopsy cultures are NGTD- only grew a corynebacterium that is likely not a pathogen  Hep C neg (8/22)    Recommend:   - CT chest on 8/14 suspicious for fungal infection, serologies have been negative and pt. clinically improved. Most recent CT scan of lungs shows decrease in size of nodules  - f/u CMV PCR to be sent again this week  - Continue Cefepime and Voriconazole for pna  - C.diff- diarrhea improved- d/marques oral abx.     Will plan to complete IV cefepime x 14 days and discharge home on oral Voriconazole  nystatin, mepron  repeat CMV viral load    Gary Lujan MD  [phone]  After 5pm/weekends [phone]    - OI prophylaxis with Mepron; s/p 6 months of Valganciclovir

## 2018-08-28 NOTE — PROGRESS NOTE ADULT - PROBLEM SELECTOR PLAN 1
- Improving on current Abx. With decrease in size of bilateral opacities noted on repeat chest CT 8/27.  - Per ID will complete 2 week course of cefepime at 1gm Q8 and voriconzaole 200mg PO Q12 through Wednesday 8/29.

## 2018-08-28 NOTE — PROGRESS NOTE ADULT - ASSESSMENT
68 year old man PMH NICM HFrEF s/p HM2 LVAD 6/2017, who underwent heart transplant on 2/23/18 (CMV D-/R+ and Toxo D-/R+, Hep C+ heart) with post op graft dysfunction who underwent plasmapheresis and IVIG x2 as well as rituximab, with suspected fungal PNA diagnosed 6/2018 on voriconazole who presented with fevers from outpt clinic appointment - secondary to recurrent PNA with new RUL infiltrate on CT scan - improving on broad spectrum antibiotics. S/p lung biopsy 8/18. C diff toxin positive - on oral vanco, afebrile with continued improvement in diarrhea. Currently doing well. Euvolemic on exam, without s/s of graft dysfunction.

## 2018-08-28 NOTE — PROGRESS NOTE ADULT - ASSESSMENT
67 y/o male with PMH NICM HFrEF s/p HM2 LVAD 6/2017, who underwent heart transplant on 2/23/18 (CMV D-/R+ and Toxo D-/R+, Hep C+ heart) with post op graft dysfunction who underwent plasmapheresis and IVIG x2 as well as rituximab, with suspected fungal PNA diagnosed 6/2018 on voriconazole, who presented to clinic today after routine blood work, stating he is not feeling well this morning, feels weak, no chills, no reported fever, denies chest pain or sob. Also complains of decreased PO intake for the last few days.  Denies nausea, vomiting or diarrhea. Of note right wrist is slightly tender, warm to touch w/ fluctuance, has had wrist swelling on previous admission with negative workup. Temp in clinic was 102 - most likely secondary to recurrent PNA based on new productive cough x 3 days and RUL infiltrate on CT scan.  8/14 Called by lab> arthrocentesis fluid sent for cell count> state not enough fluid for count  8/15 Still febrile, Tmax 101.8. Sputum Cx with mod gram neg rods. Unable to broaden cefepime- pt with hx of colonized carbapenem pseudomonas per ID. IR consulted to evaluate for transthoracic bx of R lung  8/16 CT guided biopsy of Right lung scheduled for Friday with Dr. Alfred, continued on IV antibiotics. VSS, Pt afebrile Tmax 98.4. Tacro level 11.5  8/17 awaiting Rt Lung bx, afebrile tacro level 10.6 valcyte decreased to every other day to start 8/19.   8/18 VVS; Right lung bx: Gram Stain: polymorphonuclear leukocytes seen. No organisms seen. Prednisone decreased to 3 mg PO daily as per CHF 2/2 infectious process. Continue with current medication regimen.   8/19 vvs: awaiting final results of Cardiac biopsy.  Tacro level 7.7 today   8/20 Pt is feeling better today, seen OOB in chair. Cardiac biopsy results still pending.  tacro level 8.0 today.  pt is afebrile but remains leukopenic. WBC 2.7   8/21 VVS; (+) loose stools. (+) Cdif --> Continue on PO vanco and flagyl IV per ID. Continue with current medication regimen. Start Nepro 1 can daily.  Check CMV PCR level in am. Awaiting lung bx cultures.   8/22    vss    awaiting  lab results including cmv   and rt wrist tap,   fungal cx pending  from rll infiltrate   8/23 VSS BM x 1 today- more formed in consistency. Per ID-continue C-diff abx -Vanco and flagyl x 14 days until 9/1. Repeat CT scan early next week w/ possible biopsy  8/25 VSS reports improved BM more formed c/w Vancomycin and flagyl until 9/1 Chest  CT scan for Monday Tacro level 8.1MG level in am   8/26 VSS improve dBM formed CT Scan in am Tacro level 10.3 MG 2.0  8/27 CT Chest result pending, plan for ambulation today TACRO level pending.  Pt taken off Isolation today.  is eager to go home sometime this week.   8/28 pt doing well, CT results unchanged.  Off ABX today will stay for 24 hrs and plan to d/c home Wednesday

## 2018-08-28 NOTE — PROGRESS NOTE ADULT - PROBLEM SELECTOR PLAN 2
Immunosuppression prophylaxis:  Tacrolimus 0.5mg in AM/1mg in PM. Level therapeutic at 8.2 (goal 8-10). Would continue current regimen.  CellCept on hold due to leukopenia and infection. WBC 4.6 from 5.6.  Prednisone 3 mg po daily (since 8/19). Would further taper to 2 mg po daily on 9/10/18.  Next EMB scheduled for 9/5 (last on 8/1)    Antimicrobial prophylaxis:  Intermediate risk CMV - Valcyte held due to leukopenia. He is 6 months post-transplant so will monitor CMV PCR with monthly labs and not resume.  Intermediate risk Toxo - continue Mepron 1500 mg po daily  PCP prophy - covered by Mepron.  Hep C+ - he completed a course of Mayvret with undetectable Hep C virus now. Will need follow-up increased risk donor testing at 12 months post-OHT.    Other prophylaxis:  Pepcid 20 mg po daily  ECASA 81 mg po daily  Citracal + D 2 tabs po BID  Pravachol 20 mg po QHS

## 2018-08-29 ENCOUNTER — TRANSCRIPTION ENCOUNTER (OUTPATIENT)
Age: 68
End: 2018-08-29

## 2018-08-29 VITALS
TEMPERATURE: 98 F | DIASTOLIC BLOOD PRESSURE: 77 MMHG | SYSTOLIC BLOOD PRESSURE: 113 MMHG | HEART RATE: 110 BPM | OXYGEN SATURATION: 96 % | RESPIRATION RATE: 18 BRPM

## 2018-08-29 LAB
ANION GAP SERPL CALC-SCNC: 10 MMOL/L — SIGNIFICANT CHANGE UP (ref 5–17)
BUN SERPL-MCNC: 28 MG/DL — HIGH (ref 7–23)
CALCIUM SERPL-MCNC: 9.8 MG/DL — SIGNIFICANT CHANGE UP (ref 8.4–10.5)
CHLORIDE SERPL-SCNC: 105 MMOL/L — SIGNIFICANT CHANGE UP (ref 96–108)
CO2 SERPL-SCNC: 24 MMOL/L — SIGNIFICANT CHANGE UP (ref 22–31)
CREAT SERPL-MCNC: 1.28 MG/DL — SIGNIFICANT CHANGE UP (ref 0.5–1.3)
GLUCOSE BLDC GLUCOMTR-MCNC: 100 MG/DL — HIGH (ref 70–99)
GLUCOSE BLDC GLUCOMTR-MCNC: 112 MG/DL — HIGH (ref 70–99)
GLUCOSE BLDC GLUCOMTR-MCNC: 119 MG/DL — HIGH (ref 70–99)
GLUCOSE SERPL-MCNC: 94 MG/DL — SIGNIFICANT CHANGE UP (ref 70–99)
HCT VFR BLD CALC: 28.7 % — LOW (ref 39–50)
HGB BLD-MCNC: 8.7 G/DL — LOW (ref 13–17)
MCHC RBC-ENTMCNC: 28.5 PG — SIGNIFICANT CHANGE UP (ref 27–34)
MCHC RBC-ENTMCNC: 30.2 GM/DL — LOW (ref 32–36)
MCV RBC AUTO: 94.3 FL — SIGNIFICANT CHANGE UP (ref 80–100)
PLATELET # BLD AUTO: 555 K/UL — HIGH (ref 150–400)
POTASSIUM SERPL-MCNC: 5.7 MMOL/L — HIGH (ref 3.5–5.3)
POTASSIUM SERPL-SCNC: 5.7 MMOL/L — HIGH (ref 3.5–5.3)
RBC # BLD: 3.04 M/UL — LOW (ref 4.2–5.8)
RBC # FLD: 21.2 % — HIGH (ref 10.3–14.5)
SODIUM SERPL-SCNC: 139 MMOL/L — SIGNIFICANT CHANGE UP (ref 135–145)
TACROLIMUS SERPL-MCNC: 8.4 NG/ML — SIGNIFICANT CHANGE UP
WBC # BLD: 5.6 K/UL — SIGNIFICANT CHANGE UP (ref 3.8–10.5)
WBC # FLD AUTO: 5.6 K/UL — SIGNIFICANT CHANGE UP (ref 3.8–10.5)

## 2018-08-29 PROCEDURE — 87798 DETECT AGENT NOS DNA AMP: CPT

## 2018-08-29 PROCEDURE — 85610 PROTHROMBIN TIME: CPT

## 2018-08-29 PROCEDURE — 80202 ASSAY OF VANCOMYCIN: CPT

## 2018-08-29 PROCEDURE — 87581 M.PNEUMON DNA AMP PROBE: CPT

## 2018-08-29 PROCEDURE — 86357 NK CELLS TOTAL COUNT: CPT

## 2018-08-29 PROCEDURE — 99239 HOSP IP/OBS DSCHRG MGMT >30: CPT

## 2018-08-29 PROCEDURE — 86480 TB TEST CELL IMMUN MEASURE: CPT

## 2018-08-29 PROCEDURE — 99231 SBSQ HOSP IP/OBS SF/LOW 25: CPT | Mod: GC

## 2018-08-29 PROCEDURE — 88185 FLOWCYTOMETRY/TC ADD-ON: CPT

## 2018-08-29 PROCEDURE — 86900 BLOOD TYPING SEROLOGIC ABO: CPT

## 2018-08-29 PROCEDURE — 87070 CULTURE OTHR SPECIMN AEROBIC: CPT

## 2018-08-29 PROCEDURE — 80053 COMPREHEN METABOLIC PANEL: CPT

## 2018-08-29 PROCEDURE — 87799 DETECT AGENT NOS DNA QUANT: CPT

## 2018-08-29 PROCEDURE — 87075 CULTR BACTERIA EXCEPT BLOOD: CPT

## 2018-08-29 PROCEDURE — 88305 TISSUE EXAM BY PATHOLOGIST: CPT

## 2018-08-29 PROCEDURE — 87102 FUNGUS ISOLATION CULTURE: CPT

## 2018-08-29 PROCEDURE — 99233 SBSQ HOSP IP/OBS HIGH 50: CPT

## 2018-08-29 PROCEDURE — 85730 THROMBOPLASTIN TIME PARTIAL: CPT

## 2018-08-29 PROCEDURE — 85027 COMPLETE CBC AUTOMATED: CPT

## 2018-08-29 PROCEDURE — 87486 CHLMYD PNEUM DNA AMP PROBE: CPT

## 2018-08-29 PROCEDURE — 71045 X-RAY EXAM CHEST 1 VIEW: CPT

## 2018-08-29 PROCEDURE — 88173 CYTOPATH EVAL FNA REPORT: CPT

## 2018-08-29 PROCEDURE — 32405: CPT

## 2018-08-29 PROCEDURE — 86355 B CELLS TOTAL COUNT: CPT

## 2018-08-29 PROCEDURE — 87640 STAPH A DNA AMP PROBE: CPT

## 2018-08-29 PROCEDURE — 86901 BLOOD TYPING SEROLOGIC RH(D): CPT

## 2018-08-29 PROCEDURE — 83615 LACTATE (LD) (LDH) ENZYME: CPT

## 2018-08-29 PROCEDURE — 77012 CT SCAN FOR NEEDLE BIOPSY: CPT

## 2018-08-29 PROCEDURE — 87449 NOS EACH ORGANISM AG IA: CPT

## 2018-08-29 PROCEDURE — 89060 EXAM SYNOVIAL FLUID CRYSTALS: CPT

## 2018-08-29 PROCEDURE — 88184 FLOWCYTOMETRY/ TC 1 MARKER: CPT

## 2018-08-29 PROCEDURE — 87324 CLOSTRIDIUM AG IA: CPT

## 2018-08-29 PROCEDURE — 86665 EPSTEIN-BARR CAPSID VCA: CPT

## 2018-08-29 PROCEDURE — 86403 PARTICLE AGGLUT ANTBDY SCRN: CPT

## 2018-08-29 PROCEDURE — 86664 EPSTEIN-BARR NUCLEAR ANTIGEN: CPT

## 2018-08-29 PROCEDURE — 80048 BASIC METABOLIC PNL TOTAL CA: CPT

## 2018-08-29 PROCEDURE — 93005 ELECTROCARDIOGRAM TRACING: CPT

## 2018-08-29 PROCEDURE — 87522 HEPATITIS C REVRS TRNSCRPJ: CPT

## 2018-08-29 PROCEDURE — 87205 SMEAR GRAM STAIN: CPT

## 2018-08-29 PROCEDURE — 84550 ASSAY OF BLOOD/URIC ACID: CPT

## 2018-08-29 PROCEDURE — 86663 EPSTEIN-BARR ANTIBODY: CPT

## 2018-08-29 PROCEDURE — 82962 GLUCOSE BLOOD TEST: CPT

## 2018-08-29 PROCEDURE — 73120 X-RAY EXAM OF HAND: CPT

## 2018-08-29 PROCEDURE — 87633 RESP VIRUS 12-25 TARGETS: CPT

## 2018-08-29 PROCEDURE — 86850 RBC ANTIBODY SCREEN: CPT

## 2018-08-29 PROCEDURE — 83735 ASSAY OF MAGNESIUM: CPT

## 2018-08-29 PROCEDURE — 74150 CT ABDOMEN W/O CONTRAST: CPT

## 2018-08-29 PROCEDURE — 71250 CT THORAX DX C-: CPT

## 2018-08-29 PROCEDURE — 73090 X-RAY EXAM OF FOREARM: CPT

## 2018-08-29 PROCEDURE — 88172 CYTP DX EVAL FNA 1ST EA SITE: CPT

## 2018-08-29 PROCEDURE — 93306 TTE W/DOPPLER COMPLETE: CPT

## 2018-08-29 PROCEDURE — 80197 ASSAY OF TACROLIMUS: CPT

## 2018-08-29 PROCEDURE — 88312 SPECIAL STAINS GROUP 1: CPT

## 2018-08-29 PROCEDURE — 87086 URINE CULTURE/COLONY COUNT: CPT

## 2018-08-29 PROCEDURE — 87040 BLOOD CULTURE FOR BACTERIA: CPT

## 2018-08-29 RX ORDER — MAGNESIUM OXIDE 400 MG ORAL TABLET 241.3 MG
1 TABLET ORAL
Qty: 0 | Refills: 0 | COMMUNITY
Start: 2018-08-29

## 2018-08-29 RX ORDER — VORICONAZOLE 10 MG/ML
1 INJECTION, POWDER, LYOPHILIZED, FOR SOLUTION INTRAVENOUS
Qty: 0 | Refills: 0 | COMMUNITY
Start: 2018-08-29

## 2018-08-29 RX ORDER — TACROLIMUS 5 MG/1
1 CAPSULE ORAL
Qty: 0 | Refills: 0 | COMMUNITY
Start: 2018-08-29

## 2018-08-29 RX ORDER — FAMOTIDINE 10 MG/ML
1 INJECTION INTRAVENOUS
Qty: 0 | Refills: 0 | COMMUNITY
Start: 2018-08-29

## 2018-08-29 RX ORDER — SODIUM POLYSTYRENE SULFONATE 4.1 MEQ/G
15 POWDER, FOR SUSPENSION ORAL ONCE
Qty: 0 | Refills: 0 | Status: COMPLETED | OUTPATIENT
Start: 2018-08-29 | End: 2018-08-29

## 2018-08-29 RX ORDER — MAGNESIUM OXIDE 400 MG ORAL TABLET 241.3 MG
2 TABLET ORAL
Qty: 0 | Refills: 0 | COMMUNITY
Start: 2018-08-29

## 2018-08-29 RX ORDER — SODIUM BICARBONATE 1 MEQ/ML
1 SYRINGE (ML) INTRAVENOUS
Qty: 0 | Refills: 0 | COMMUNITY
Start: 2018-08-29

## 2018-08-29 RX ORDER — ATOVAQUONE 750 MG/5ML
10 SUSPENSION ORAL
Qty: 0 | Refills: 0 | COMMUNITY
Start: 2018-08-29

## 2018-08-29 RX ORDER — ASPIRIN/CALCIUM CARB/MAGNESIUM 324 MG
1 TABLET ORAL
Qty: 0 | Refills: 0 | COMMUNITY
Start: 2018-08-29

## 2018-08-29 RX ADMIN — Medication 81 MILLIGRAM(S): at 11:05

## 2018-08-29 RX ADMIN — Medication 3 MILLIGRAM(S): at 08:05

## 2018-08-29 RX ADMIN — VORICONAZOLE 200 MILLIGRAM(S): 10 INJECTION, POWDER, LYOPHILIZED, FOR SOLUTION INTRAVENOUS at 05:14

## 2018-08-29 RX ADMIN — TACROLIMUS 0.5 MILLIGRAM(S): 5 CAPSULE ORAL at 08:05

## 2018-08-29 RX ADMIN — Medication 1 TABLET(S): at 11:06

## 2018-08-29 RX ADMIN — SODIUM POLYSTYRENE SULFONATE 15 GRAM(S): 4.1 POWDER, FOR SUSPENSION ORAL at 11:05

## 2018-08-29 RX ADMIN — VORICONAZOLE 200 MILLIGRAM(S): 10 INJECTION, POWDER, LYOPHILIZED, FOR SOLUTION INTRAVENOUS at 17:00

## 2018-08-29 RX ADMIN — MAGNESIUM OXIDE 400 MG ORAL TABLET 400 MILLIGRAM(S): 241.3 TABLET ORAL at 08:05

## 2018-08-29 RX ADMIN — Medication 650 MILLIGRAM(S): at 17:00

## 2018-08-29 RX ADMIN — CEFEPIME 100 MILLIGRAM(S): 1 INJECTION, POWDER, FOR SOLUTION INTRAMUSCULAR; INTRAVENOUS at 05:14

## 2018-08-29 RX ADMIN — Medication 125 MILLIGRAM(S): at 05:14

## 2018-08-29 RX ADMIN — Medication 125 MILLIGRAM(S): at 11:05

## 2018-08-29 RX ADMIN — Medication 125 MILLIGRAM(S): at 17:00

## 2018-08-29 RX ADMIN — FAMOTIDINE 20 MILLIGRAM(S): 10 INJECTION INTRAVENOUS at 11:05

## 2018-08-29 RX ADMIN — Medication 650 MILLIGRAM(S): at 05:14

## 2018-08-29 RX ADMIN — CEFEPIME 100 MILLIGRAM(S): 1 INJECTION, POWDER, FOR SOLUTION INTRAMUSCULAR; INTRAVENOUS at 14:18

## 2018-08-29 RX ADMIN — MAGNESIUM OXIDE 400 MG ORAL TABLET 400 MILLIGRAM(S): 241.3 TABLET ORAL at 16:59

## 2018-08-29 RX ADMIN — ATOVAQUONE 1500 MILLIGRAM(S): 750 SUSPENSION ORAL at 11:10

## 2018-08-29 NOTE — PROGRESS NOTE ADULT - PROBLEM SELECTOR PROBLEM 6
Other decreased white blood cell (WBC) count
Acute gout of hand, unspecified cause, unspecified laterality
Other decreased white blood cell (WBC) count

## 2018-08-29 NOTE — DISCHARGE NOTE ADULT - PATIENT PORTAL LINK FT
You can access the [facility] Patient Portal, offered by [facility], by registering with the following website: [email or web address]

## 2018-08-29 NOTE — PROGRESS NOTE ADULT - PROBLEM SELECTOR PROBLEM 5
Renal tubular acidosis, type IV

## 2018-08-29 NOTE — DISCHARGE NOTE ADULT - CARE PROVIDERS DIRECT ADDRESSES
[email or web address]

## 2018-08-29 NOTE — DISCHARGE NOTE ADULT - PROVIDER TOKENS
ROBERT:'[id number]:'[id number]' TOKEN:'[id number]:'[id number]',TOKEN:'[id number]'

## 2018-08-29 NOTE — DISCHARGE NOTE ADULT - HOSPITAL COURSE
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 Also complains of decreased PO intake for the last few days.  Denies nausea, vomiting or diarrhea. Of note right wrist is slightly tender, warm to touch w/ fluctuance, has had wrist swelling on previous admission with negative workup. Temp in clinic was 102 - most likely secondary to recurrent PNA based on new productive cough x 3 days and RUL infiltrate on CT scan.  8/14 Called by lab> arthrocentesis fluid sent for cell count> state not enough fluid for count  8/15 Still febrile, Tmax 101.8. Sputum Cx with mod gram neg rods. Unable to broaden cefepime- pt with hx of colonized carbapenem pseudomonas per ID. IR consulted to evaluate for transthoracic bx of R lung  8/16 CT guided biopsy of Right lung scheduled for Friday with Dr. Alfred, continued on IV antibiotics. VSS, Pt afebrile Tmax 98.4. Tacro level 11.5  8/17 awaiting Rt Lung bx, afebrile tacro level 10.6 valcyte decreased to every other day to start 8/19.   8/18 VVS; Right lung bx: Gram Stain: polymorphonuclear leukocytes seen. No organisms seen. Prednisone decreased to 3 mg PO daily as per CHF 2/2 infectious process. Continue with current medication regimen.   8/19 vvs: awaiting final results of Cardiac biopsy.  Tacro level 7.7 today   8/20 Pt is feeling better today, seen OOB in chair. Cardiac biopsy results still pending.  tacro level 8.0 today.  pt is afebrile but remains leukopenic. WBC 2.7   8/21 VVS; (+) loose stools. (+) Cdif --> Continue on PO vanco and flagyl IV per ID. Continue with current medication regimen. Start Nepro 1 can daily.  Check CMV PCR level in am. Awaiting lung bx cultures.   8/22    vss    awaiting  lab results including cmv   and rt wrist tap,   fungal cx pending  from rll infiltrate   8/23 VSS BM x 1 today- more formed in consistency. Per ID-continue C-diff abx -Vanco and flagyl x 14 days until 9/1. Repeat CT scan early next week w/ possible biopsy  8/25 VSS reports improved BM more formed c/w Vancomycin and flagyl until 9/1 Chest  CT scan for Monday Tacro level 8.1MG level in am   8/26 VSS improve dBM formed CT Scan in am Tacro level 10.3 MG 2.0  8/27 CT Chest result pending, plan for ambulation today TACRO level pending.  Pt taken off Isolation today.  is eager to go home sometime this week.   8/28 pt doing well, CT results unchanged.  Off ABX today will stay for 24 hrs and plan to d/c home Wednesday 8/29 VSS, given Kayexalate per HF for K 5.7. Discharged home today on PO Vanco x 2 weeks.

## 2018-08-29 NOTE — PROGRESS NOTE ADULT - SUBJECTIVE AND OBJECTIVE BOX
INFECTIOUS DISEASES FOLLOW UP-- Sujey Lujan  [phone]    This is a follow up note for this  68yMale with s/p OHTx in February 2018 admitted with pneumonia  s/p Cefepime  feeling better afebrile with discharge home today      ROS:  CONSTITUTIONAL:  No fever, good appetite  CARDIOVASCULAR:  No chest pain or palpitations  RESPIRATORY:  No dyspnea  GASTROINTESTINAL:  No nausea, vomiting, diarrhea, or abdominal pain  GENITOURINARY:  No dysuria  NEUROLOGIC:  No headache,     Allergies    No Known Allergies    Intolerances        ANTIBIOTICS/RELEVANT:  antimicrobials  atovaquone Suspension 1500 milliGRAM(s) Oral daily  vancomycin    Solution 125 milliGRAM(s) Oral every 6 hours  voriconazole 200 milliGRAM(s) Oral every 12 hours    immunologic:  tacrolimus 0.5 milliGRAM(s) Oral <User Schedule>  tacrolimus 1 milliGRAM(s) Oral <User Schedule>    OTHER:  acetaminophen    Suspension 500 milliGRAM(s) Oral every 6 hours PRN  aspirin enteric coated 81 milliGRAM(s) Oral daily  Calcium Citrate + Vit D 315mg/250 IU 2 Tablet(s) 2 Tablet(s) Oral <User Schedule>  docusate sodium 100 milliGRAM(s) Oral two times a day PRN  famotidine    Tablet 20 milliGRAM(s) Oral daily  insulin lispro (HumaLOG) corrective regimen sliding scale   SubCutaneous three times a day before meals  insulin lispro (HumaLOG) corrective regimen sliding scale   SubCutaneous at bedtime  magnesium oxide 400 milliGRAM(s) Oral two times a day with meals  multivitamin 1 Tablet(s) Oral daily  pravastatin 20 milliGRAM(s) Oral at bedtime  predniSONE   Tablet 3 milliGRAM(s) Oral <User Schedule>  sodium bicarbonate 650 milliGRAM(s) Oral two times a day  sodium chloride 0.65% Nasal 1 Spray(s) Both Nostrils three times a day      Objective:  Vital Signs Last 24 Hrs  T(C): 36.7 (29 Aug 2018 13:28), Max: 36.7 (28 Aug 2018 20:03)  T(F): 98.1 (29 Aug 2018 13:28), Max: 98.1 (28 Aug 2018 20:03)  HR: 110 (29 Aug 2018 13:28) (107 - 110)  BP: 113/77 (29 Aug 2018 13:28) (111/78 - 130/93)  BP(mean): --  RR: 18 (29 Aug 2018 13:28) (18 - 18)  SpO2: 96% (29 Aug 2018 13:28) (96% - 98%)    PHYSICAL EXAM:  Constitutional:no acute distress  Eyes:WALT, EOMI  Ear/Nose/Throat: no oral lesions, 	  Respiratory: clear BL  Cardiovascular: S1S2  Gastrointestinal:soft, (+) BS, no tenderness  Extremities:no e/e/c  No Lymphadenopathy  IV sites not inflammed.    LABS:                        8.7    5.6   )-----------( 555      ( 29 Aug 2018 07:28 )             28.7     08-29    139  |  105  |  28<H>  ----------------------------<  94  5.7<H>   |  24  |  1.28    Ca    9.8      29 Aug 2018 07:28            MICROBIOLOGY:            RECENT CULTURES:      RADIOLOGY & ADDITIONAL STUDIES:    < from: CT Chest No Cont (08.27.18 @ 08:39) >    INTERPRETATION:  Clinical information: Follow-up examination. Patient is   status post heart transplant. Exam is compared to previous studies of   8/14/2018 as well as 7/13/2018.    Few less than 1 cm lymph nodes are present in the pretracheal space.     Heart is normal in size. No pericardial effusion is noted. Patient is   status post sternotomy. Calcified linear filling defects are noted within   the left subclavian, left brachiocephalic veins as well as superior vena   cava.     No endobronchial lesions are noted. Compared to the previous examination,   the nodular opacity in the right upper lobe has decreased in size. On the   current examination it measures approximately 2.8 x 2.8 cm. The nodular   opacity in the left lower lobe has decreased in size when compared to   previous exams. On the current examination it measures 1.7 cm. Thick   linear opacities likely representing atelectasis/scarring are noted   within the right middle lobe and right lower lobe. Trace pleural   effusions are noted bilaterally.    Below the diaphragm, visualized portions of the abdomen demonstrate   cholelithiasis. Isodense lesion in the left kidney is incompletely   assessed on this exam.     Degenerative changes of the spine are noted.    Impression: Nodular opacities in the right upper and left lower lobes   have decreased in size when compared to previous exams.    Calcified thrombus within the left subclavian vein, left brachiocephalic   vein as well as the superior vena cava.      < end of copied text >

## 2018-08-29 NOTE — DISCHARGE NOTE ADULT - ADDITIONAL INSTRUCTIONS
Follow up on Friday 8/31 for routine blood draw (CBC, CMP, tacrolimus level).  Follow up on Wednesday 9/5 for cardiac biopsy and with Dr. Stahl in cardiology clinic.

## 2018-08-29 NOTE — PROGRESS NOTE ADULT - PROBLEM SELECTOR PROBLEM 3
Clostridium difficile infection
Heart transplant, orthotopic, status
Anemia due to other cause
Clostridium difficile infection
Heart transplant, orthotopic, status
Clostridium difficile infection
Anemia due to other cause

## 2018-08-29 NOTE — PROGRESS NOTE ADULT - SUBJECTIVE AND OBJECTIVE BOX
Learner: Patient  Barriers: patient has limited reading abilities and has hard time to recall medications/schedule despite multiple education sessions.     Patient post cardiac transplant was readmitted with ID issues.      Method used: Verbal discussion and written materials    Medication safety was discussed with the patient: allergies, herbals, adherence, interactions, medication precautions, miss dose instructions, purpose, side effects, signs and symptoms to report, and storage & handling    Patient was able to repeat and verbalize main key points.    Education Summary: Discharge immunosuppressant medications and prophylactic anti-infective agents reviewed with the patient (Valcyte and CellCept are on hold; prednisone was lowered to 3 mG a day). Outpatient medication schedule was discussed in detail including: medication name, indication, dose, administration times, treatment duration, side effects, drug interactions, and special instructions. Patient questions and concerns were answered and addressed. Patient demonstrated some understanding. The pill box and medications were not with the patient this admission and nobody was able to bring it. Provided the patient with the updated discharge list using Med Action Plan Pro. The heart transplant coordinator will call the patient to assure that the patient takes medications as directed. Prograf level is scheduled for Friday; the heart transplant coordinator will set up patient's boxes.     Time spent discharge education: 60 minutes    MEDICATIONS  (STANDING):  aspirin enteric coated 81 milliGRAM(s) Oral daily  atovaquone Suspension 1500 milliGRAM(s) Oral daily  Calcium Citrate + Vit D 315mg/250 IU 2 Tablet(s) 2 Tablet(s) Oral <User Schedule>  famotidine    Tablet 20 milliGRAM(s) Oral daily  insulin lispro (HumaLOG) corrective regimen sliding scale   SubCutaneous three times a day before meals  insulin lispro (HumaLOG) corrective regimen sliding scale   SubCutaneous at bedtime  magnesium oxide 400 milliGRAM(s) Oral two times a day with meals  multivitamin 1 Tablet(s) Oral daily  pravastatin 20 milliGRAM(s) Oral at bedtime  predniSONE   Tablet 3 milliGRAM(s) Oral <User Schedule>  sodium bicarbonate 650 milliGRAM(s) Oral two times a day  sodium chloride 0.65% Nasal 1 Spray(s) Both Nostrils three times a day  tacrolimus 0.5 milliGRAM(s) Oral <User Schedule>  tacrolimus 1 milliGRAM(s) Oral <User Schedule>  vancomycin    Solution 125 milliGRAM(s) Oral every 6 hours  voriconazole 200 milliGRAM(s) Oral every 12 hours    MEDICATIONS  (PRN):  acetaminophen    Suspension 500 milliGRAM(s) Oral every 6 hours PRN For Temp greater than 38.5 C (101.3 F)  docusate sodium 100 milliGRAM(s) Oral two times a day PRN Constipation      Cardiac Transplant Medications:  Tacrolimus adjusted to trough - 0.5 mG AM and 1 mG HS  Mycophenolate mofetil - on hold   Prednisone taper - current dose is 3 mG PO daily in the morning   Atovaquone 1,500 mG PO daily with food   Valganciclovir - on hold   Pravastatin 20 mG PO at bedtime  Aspirin EC 81 mG daily                         8.7    5.6   )-----------( 555      ( 29 Aug 2018 07:28 )             28.7     08-29    139  |  105  |  28<H>  ----------------------------<  94  5.7<H>   |  24  |  1.28    Ca    9.8      29 Aug 2018 07:28        Tacrolimus (), Serum: 8.4 ng/mL (08-29-18 @ 09:01)  Tacrolimus (), Serum: 8.2 ng/mL (08-28-18 @ 09:07)  Tacrolimus (), Serum: 8.9 ng/mL (08-27-18 @ 17:17)

## 2018-08-29 NOTE — PROGRESS NOTE ADULT - ATTENDING COMMENTS
Marga Martines, PhD  [phone]
Marga Martines, PhD  [phone]
Patient seen and examined and discussed with DR. ARCHIBALD.    I agree with her interval findings and plans as noted above
Patient seen and examined with DR. Link  I agreee with her interval history exam fidnigns and plans as noted above    Tentavie plan for discharge home on 8/29  Would continue oral Vanco in a taper fashion for C.diff diarrhea now improved.  complete the Cefepime prior to discharge  continue the Voriconazole  continue the Mepron    out patient follow up in 2 weeks
Patient seen and examined with Dr. Fang  I agree with her interval history and plans as noted above    denies chest pain or cough but still complaining of diarrhea today    complete 14 days for C.diff diarrhea  Continue Cefepime and plan to repeat imaging with chest CT to look for improvement after about 14 days of therapy
Patient seen and examined with Dr. Fang  I agree with her interval history exam findings and plans as noted above    Clinically improving, afebrile, diarrhea improving  Plan to repeat CT scan of chest on Monday  IF improved will complete anti-infectives If worsening will need repeat tissue biopsy    Gary Lujan MD  [phone]  After 5pm/weekends [phone]
Patient seen and examined with Dr. Perez  I agree with his interval history exam findings and plans as noted above  Reports feeling a bit better  No cough- only 'spit'  sputum colonized with GNRs- ID/sen. pending but has grown a carbapenem resistant pseudomonas on past admissions  Right wrist less warm ad less swollen post arthrocentesis- cultures pending crystals negative    Patient reluctant to undergo either a bronchoscopy or a CT guided biopsy of the pulmonary infiltrates so will continue with abx. for now and follow exam and labs    Gary Lujan MD  [phone]  After 5pm/weekends [phone]
Patient seen and examined with DR. Fang  I agree with her interval history exam findings and plans as noted above  Patient complains of loose stools in the setting of C.diff+ but otherwise feels well  cough only with clear 'spit' secretions, no pleuritic chest pain    As he defervesced and is clinically improved on antibiotics despite the negative bx. results to date would continue the current meds. and plan to repeat a chest CT scan early next week. IF the infiltrates are not improving, further discussion about a second diagnostic biopsy will be needed.    Continue Mepron for PCP /toxo prophylaxis  Valganciclovir was discontinued at the six month time point  Repeat CMV viral load  HCV acquisition post treatment- recheck HCV viral load    Gary Lujan MD  [phone]  After 5pm/weekends [phone]
as above
Transthoracic lung biopsy tomorrow.
Doing well. Repeat CT with smaller lesions. Denies further diarrhea. He will have completed 2 weeks antibiotics on Wednesday. After discussion with Dr. Lujan, will plan on d/c'ing abx and antifungal with exception of PO vanc which will be tapered. Next biopsy is due next week. Rest of plan as above.
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    Clinically well and no complaints today.  He remains on IV Flagyl and oral vanco for C.diff.  Leukopenia improving off CellCept and Valcyte. Would not resume CellCept until infection is clearly under control even if WBC count climbs further.  Intermediate risk CMV, CMV PCR pending. Would not resume Valcyte given leukopenia and he is out 6 months from transplant.    Tacro marginally sub-therapeutic at 7.7. Continue 0.5/1 mg.  Continue prednisone 3 mg po daily for now, but may reduce to 2 mg po daily on Monday.  K 5.0 and renal function slightly worse, but still within normal range.
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    No sig changes. Generally feels well. Diarrhea present, but more formed.   Right sided chest discomfort near area of biopsy. Not severe, and waxes/wanes.    Awaiting culture data from right lung mass. He is improving on Cefepime and had been colonized with pseudomonas.    Stable leukopenia. Still holding CellCept and Valcyte. CMV negative 8/14, awaiting results of repeat CMV PCR.  Tacro level dropped minimally today. No change in dose (goal 8-10). Continue 0.5/1 mg.
Doing well. Repeat CT with smaller lesions. Denies further diarrhea. He has completed 2 weeks antibiotics today. Plan to continue vori 200 mg q12 at home along with vanc 125 mg q12 for 1 more week. Next biopsy is due next week. Rest of plan as above.
Doing well. Repeat CT with smaller lesions. Denies further diarrhea. He will have completed 2 weeks antibiotics on Wednesday. After discussion with Dr. Lujan, will plan on d/c'ing abx and antifungal with exception of PO vanc which will be tapered. Next biopsy is due next week. Rest of plan as above.
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    No complaints and he thinks the diarrhea is improving.  Reviewed plan with Transplant ID and will repeat chest CT imaging next week, but continue current IV Abx therapies in the meantime.    Stable leukopenia. Still holding CellCept and Valcyte. CMV negative 8/14, awaiting results of repeat CMV PCR.  Tacro therapeutic at 8.0. Continue 0.5/1 mg.
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    Cough is better, but persists. Diarrhea also persists, but slightly more formed.  Awaiting culture data from right lung mass. He is improving on Cefepime and had been colonized with pseudomonas.    Stable leukopenia. Still holding CellCept and Valcyte. CMV negative 8/14, but need to repeat CMV PCR.  Please send daily CBC with differential.  Tacro level is therapeutic (goal 8-10). Continue 0.5/1 mg.
Feels well. Transthoracic lung biopsy today.
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    Clinically well and no complaints today.  He remains on IV Flagyl and oral vanco for C.diff.  Leukopenia improving off CellCept and Valcyte. Would not resume CellCept until infection is clearly under control even if WBC count climbs further.  Intermediate risk CMV, CMV PCR pending. Would not resume Valcyte given leukopenia and he is out 6 months from transplant.    Tacro marginally sub-therapeutic at 7.7. Continue 0.5/1 mg.  Continue prednisone 3 mg po daily for now, but may reduce to 2 mg po daily on Monday.  K 5.0 and renal function slightly worse, but still within normal range.
Feels well. in good spirits.  F/u lung biopsy.  Tacro 8.6. Will d/w Dr. Stahl.
Feels well. In good spirits.  C.diff toxin (+).  F/u lung biopsy studies.  Tacro 7.7. Will d/w Dr. Stahl.
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    Cough is improving and overall he is feeling well. Diarrhea improved.  Will review plan with Transplant ID, but he is improving with current therapies.  Progressively more leukopenic despite holding CellCept. CMV negative 8/14, but need to repeat CMV PCR, please, as he is off Valcyte.  Please send daily CBC with differential.  Tacro level is therapeutic (goal 8-10). Continue 0.5/1 mg.

## 2018-08-29 NOTE — DISCHARGE NOTE ADULT - MEDICATION SUMMARY - MEDICATIONS TO TAKE
I will START or STAY ON the medications listed below when I get home from the hospital:    predniSONE 1 mg oral tablet  -- 3 tab(s) by mouth once a day  -- Indication: For Steroid    aspirin 81 mg oral delayed release tablet  -- 1 tab(s) by mouth once a day  -- Indication: For Antiplatelet    sodium bicarbonate 650 mg oral tablet  -- 1 tab(s) by mouth 2 times a day  -- Indication: For Heart transplant recipient    voriconazole 200 mg oral tablet  -- 1 tab(s) by mouth every 12 hours  -- Indication: For Antifungal    pravastatin 20 mg oral tablet  -- 1 tab(s) by mouth once a day (at bedtime)  -- Indication: For Cholesterol    vancomycin 125 mg oral capsule  -- 1 cap(s) by mouth 2 times a day   -- Finish all this medication unless otherwise directed by prescriber.    -- Indication: For Clostridium difficile infection    famotidine 20 mg oral tablet  -- 1 tab(s) by mouth once a day  -- Indication: For Pepcid    tacrolimus 0.5 mg oral capsule  -- 1 cap(s) by mouth once a day 0800AM  -- Indication: For Heart transplant recipient    tacrolimus 1 mg oral capsule  -- 1 cap(s) by mouth once a day 8PM  -- Indication: For Heart transplant recipient    magnesium oxide 400 mg (241.3 mg elemental magnesium) oral tablet  -- 1 tab(s) by mouth 2 times a day (with meals)  -- Indication: For Supplement    atovaquone 750 mg/5 mL oral suspension  -- 10 milliliter(s) by mouth once a day  -- Indication: For Heart transplant recipient    Multiple Vitamins oral tablet  -- 1 tab(s) by mouth once a day  -- Indication: For Supplement    Calcitrate with D 315 mg-250 intl units oral tablet  -- 2 tab(s) by mouth 2 times a day  -- Indication: For Supplement

## 2018-08-29 NOTE — PROGRESS NOTE ADULT - PROBLEM SELECTOR PLAN 1
- Improving on current Abx. With decrease in size of bilateral opacities noted on repeat chest CT 8/27.  - Per ID will complete 2 week course of cefepime at 1gm Q8 today, 8/29. He will continue on voriconzaole 200mg PO Q12.

## 2018-08-29 NOTE — DISCHARGE NOTE ADULT - MEDICATION SUMMARY - MEDICATIONS TO CHANGE
I will SWITCH the dose or number of times a day I take the medications listed below when I get home from the hospital:    predniSONE 5 mg oral tablet  -- 1.5 tab(s) by mouth once a day    tacrolimus 1 mg oral capsule  -- 1 cap(s) by mouth every 12 hours

## 2018-08-29 NOTE — PROGRESS NOTE ADULT - ASSESSMENT
68 year old man PMH NICM HFrEF s/p HM2 LVAD 6/2017, who underwent heart transplant on 2/23/18 (CMV D-/R+ and Toxo D-/R+, Hep C+ heart) with post op graft dysfunction who underwent plasmapheresis and IVIG x2 as well as rituximab, with suspected fungal PNA diagnosed 6/2018 on voriconazole who presented with fevers from outpt clinic appointment - secondary to recurrent PNA with new RUL infiltrate on CT scan, which is improving on broad spectrum antibiotics. S/p lung biopsy 8/18. C diff toxin positive - on oral vanco, afebrile with resolution of watery stools. Euvolemic on exam, without s/s of graft dysfunction. Currently doing well and stable for discharge home.

## 2018-08-29 NOTE — PROGRESS NOTE ADULT - PROBLEM SELECTOR PLAN 4
- Resolved, but with hyperkalemia today with K 5.7  - Received kayexalate 15mg x 1 this AM per discussion with Dr. Viramontes and CT surg NP  - low K diet reinforced  - avoid nephrotoxins

## 2018-08-29 NOTE — DISCHARGE NOTE ADULT - MEDICATION SUMMARY - MEDICATIONS TO STOP TAKING
I will STOP taking the medications listed below when I get home from the hospital:    valGANciclovir 450 mg oral tablet  -- 1 tab(s) by mouth 2 times a day    sodium polystyrene sulfonate 15 g/60 mL oral suspension  -- 1-2 dose(s) by mouth once a day, As Needed    repaglinide 0.5 mg oral tablet  -- 1 tab(s) by mouth 3 times a day (before meals)    Januvia 50 mg oral tablet  -- 1 tab(s) by mouth once a day

## 2018-08-29 NOTE — PROGRESS NOTE ADULT - PROBLEM SELECTOR PROBLEM 4
NORMAN (acute kidney injury)

## 2018-08-29 NOTE — PROGRESS NOTE ADULT - PROBLEM SELECTOR PROBLEM 1
Pneumonia of right upper lobe due to infectious organism
SIRS (systemic inflammatory response syndrome)
Pneumonia of right upper lobe due to infectious organism
SIRS (systemic inflammatory response syndrome)
Pneumonia of right upper lobe due to infectious organism
SIRS (systemic inflammatory response syndrome)
Pneumonia of right upper lobe due to infectious organism
Pneumonia of right upper lobe due to infectious organism

## 2018-08-29 NOTE — PROGRESS NOTE ADULT - SUBJECTIVE AND OBJECTIVE BOX
Subjective:  - No events overnight  - States he feels well  - Infrequent productive cough with clear sputum  - Denies watery stool, still loose, but improving  - Ambulating around unit without LOBATO. Denies orthopnea, PND, CP, palpitations, lightheadedness, dizziness.    Medications:  acetaminophen    Suspension 500 milliGRAM(s) Oral every 6 hours PRN  aspirin enteric coated 81 milliGRAM(s) Oral daily  atovaquone Suspension 1500 milliGRAM(s) Oral daily  Calcium Citrate + Vit D 315mg/250 IU 2 Tablet(s) 2 Tablet(s) Oral <User Schedule>  cefepime   IVPB      cefepime   IVPB 1000 milliGRAM(s) IV Intermittent every 8 hours  docusate sodium 100 milliGRAM(s) Oral two times a day PRN  famotidine    Tablet 20 milliGRAM(s) Oral daily  insulin lispro (HumaLOG) corrective regimen sliding scale   SubCutaneous three times a day before meals  insulin lispro (HumaLOG) corrective regimen sliding scale   SubCutaneous at bedtime  magnesium oxide 400 milliGRAM(s) Oral two times a day with meals  multivitamin 1 Tablet(s) Oral daily  pravastatin 20 milliGRAM(s) Oral at bedtime  predniSONE   Tablet 3 milliGRAM(s) Oral <User Schedule>  sodium bicarbonate 650 milliGRAM(s) Oral two times a day  sodium chloride 0.65% Nasal 1 Spray(s) Both Nostrils three times a day  tacrolimus 0.5 milliGRAM(s) Oral <User Schedule>  tacrolimus 1 milliGRAM(s) Oral <User Schedule>  vancomycin    Solution 125 milliGRAM(s) Oral every 6 hours  voriconazole 200 milliGRAM(s) Oral every 12 hours      Physical Exam:    Vitals:  Vital Signs Last 24 Hours  T(C): 36.7 (18 @ 05:02), Max: 36.7 (18 @ 20:03)  HR: 109 (18 @ 05:02) (107 - 109)  BP: 130/93 (18 @ 05:02) (111/78 - 130/93)  RR: 18 (18 @ 05:02) (18 - 18)  SpO2: 98% (18 @ 05:02) (98% - 98%)    Weight in k.5 ( @ 08:15)    I&O's Summary    28 Aug 2018 07:01  -  29 Aug 2018 07:00  --------------------------------------------------------  IN: 1670 mL / OUT: 950 mL / NET: 720 mL    Tele: -120    General: OOB in chair. No distress. Comfortable.  HEENT: EOM intact.  Neck: Neck supple. JVP not elevated.   Chest: Clear to auscultation bilaterally  CV: Tachycardic. Normal S1 and S2. III/VI SM LLSB. Radial pulses normal. No LE edema  Abdomen: Soft, non-distended, non-tender + BS  Skin: R third digit with small area of eschar, sensation intact, non painful to palpation  Neurology: Alert and oriented times three. Sensation intact  Psych: Affect normal    Labs:                        8.7    5.6   )-----------( 555      ( 29 Aug 2018 07:28 )             28.7         139  |  105  |  28<H>  ----------------------------<  94  5.7<H>   |  24  |  1.28    Ca    9.8      29 Aug 2018 07:28    Tacrolimus (), Serum (.18 @ 09:01)    Tacrolimus (), Serum: 8.4

## 2018-08-29 NOTE — PROGRESS NOTE ADULT - ASSESSMENT
69 yo M s/p heart transplant complicated by rejection with rounds of plasmaphoresis , IVIG, rituximab x2, known colonizer with  Pseudomonas last treated for PNA in June 2018 with cefepime and voriconazole now presents with fever, decreased PO intake, productive cough. Pt had CT guided R lung mass bx on 8/17. Ddx include recurrent pneumonia vs gout flare  vs other infectious process.     Fungitell negative  Galactomannan negative  Crypto Ag negative  Quant indeterminate, however lung biopsy w negative AFB stain  Pathology report from 8/17 R lung bx showing inflammation w negative AFB and GMS stain  CT guided biopsy cultures are NGTD- only grew a corynebacterium that is likely not a pathogen  Hep C neg (8/22)    Recommend:   - CT chest on 8/14 suspicious for fungal infection, serologies have been negative and pt. clinically improved. Most recent CT scan of lungs shows decrease in size of nodules  - f/u CMV PCR to be sent as out pt     completed IV cefepime x 14 days and discharge home on oral Voriconazole  nystatin, mepron  repeat CMV viral load  follow up in ID office in 2-3weeks    Gary Lujan MD  [phone]  After 5pm/weekends [phone]

## 2018-08-29 NOTE — DISCHARGE NOTE ADULT - PLAN OF CARE
Complete Recovery 1. Daily Shower  2. Weight yourself daily and notify any weight gain greater than 2-3 pounds in 24 hours.  3. Diabetic diet - low fat, low cholesterol, no added salt.  4. Follow Cardiac Surgery Do's and Don'ts discharge instructions.   5. Increase Activity as tolerated. 1. Daily Shower  2. Weight yourself daily and notify any weight gain greater than 2-3 pounds in 24 hours.  3. Diabetic diet - low fat, low cholesterol, no added salt.  4. Follow Cardiac Surgery Do's and Don'ts discharge instructions.   5. Increase Activity as tolerated.  6. Check fingersticks before meals and at bedtime.

## 2018-08-29 NOTE — DISCHARGE NOTE ADULT - CARE PLAN
Principal Discharge DX:	Pneumonia of right upper lobe due to infectious organism  Goal:	Complete Recovery  Assessment and plan of treatment:	1. Daily Shower  2. Weight yourself daily and notify any weight gain greater than 2-3 pounds in 24 hours.  3. Diabetic diet - low fat, low cholesterol, no added salt.  4. Follow Cardiac Surgery Do's and Don'ts discharge instructions.   5. Increase Activity as tolerated. Principal Discharge DX:	Pneumonia of right upper lobe due to infectious organism  Goal:	Complete Recovery  Assessment and plan of treatment:	1. Daily Shower  2. Weight yourself daily and notify any weight gain greater than 2-3 pounds in 24 hours.  3. Diabetic diet - low fat, low cholesterol, no added salt.  4. Follow Cardiac Surgery Do's and Don'ts discharge instructions.   5. Increase Activity as tolerated.  6. Check fingersticks before meals and at bedtime.

## 2018-08-29 NOTE — PROGRESS NOTE ADULT - PROBLEM SELECTOR PLAN 3
- Diarrhea improving, no further watery stools since weekend.  - Will continue with oral vanco, with taper per ID

## 2018-08-29 NOTE — DISCHARGE NOTE ADULT - OTHER SIGNIFICANT FINDINGS
Neurology: A&Ox3, nonfocal, no gross deficits  CV : RRR+S1S2  Lungs: Respirations non-labored, B/L BS CTA  Abdomen: Soft, NT/ND, +BSx4Q, last BM 8/29 (-)N/V/D  : Voiding without difficulty  Extremities: B/L LE trace edema, negative calf tenderness, +PP      More than 30 mins spent on total encounter, patient counseling and discharge instructions.

## 2018-08-29 NOTE — PROGRESS NOTE ADULT - PROBLEM SELECTOR PLAN 2
Immunosuppression prophylaxis:  Tacrolimus 0.5mg in AM/1mg in PM. Level therapeutic at 8.4 (goal 8-10). Will continue current regimen.  CellCept on hold due to leukopenia and infection. WBC 5.6 today.  Prednisone 3 mg po daily (since 8/19). Plan for further taper to 2 mg po daily on 9/10/18.  Next EMB scheduled for 9/5 (last on 8/1)  He will have labs including tacro level drawn Friday 8/31 in AM prior to AM tacro dosing    Antimicrobial prophylaxis:  Intermediate risk CMV - Valcyte held due to leukopenia. He is 6 months post-transplant so will monitor CMV PCR with monthly labs and not resume.  Intermediate risk Toxo - continue Mepron 1500 mg po daily  PCP prophy - covered by Mepron.  Hep C+ - he completed a course of Mayvret with undetectable Hep C virus now. Will need follow-up increased risk donor testing at 12 months post-OHT.    Other prophylaxis:  Pepcid 20 mg po daily  ECASA 81 mg po daily  Citracal + D 2 tabs po BID  Pravachol 20 mg po QHS

## 2018-08-29 NOTE — DISCHARGE NOTE ADULT - CARE PROVIDER_API CALL
Edil Stahl (MD; MPH), Adv Heart Fail Trnspl[facility] Cardio; Cardiology  [address] [place] [address]  Phone: [phone]  Fax: [phone]    Vera Villaseonr (NP; RN), NP in [facility]  [address] [place] [address]  Phone: [phone]  Fax: [phone] Edil Stahl (MD; MPH), Adv Heart Fail Trnsplnt Cardio; Cardiology  [address] [place] [address]  Phone: [phone]  Fax: [phone]    Vera Villasenor (NP; RN), NP in Family Health  [address] [place] [address]  Phone: [phone]  Fax: [phone]    Sujey Lujan), Infectious Disease; Internal Medicine  [address] [place] [address]  Phone: [phone]  Fax: [phone]

## 2018-08-30 ENCOUNTER — RX RENEWAL (OUTPATIENT)
Age: 68
End: 2018-08-30

## 2018-08-31 ENCOUNTER — LABORATORY RESULT (OUTPATIENT)
Age: 68
End: 2018-08-31

## 2018-08-31 LAB
ALBUMIN SERPL ELPH-MCNC: 4 G/DL
ALP BLD-CCNC: 87 U/L
ALT SERPL-CCNC: 8 U/L
ANION GAP SERPL CALC-SCNC: 13 MMOL/L
AST SERPL-CCNC: 26 U/L
BASOPHILS # BLD AUTO: 0.02 K/UL
BASOPHILS NFR BLD AUTO: 1 %
BILIRUB SERPL-MCNC: 0.4 MG/DL
BUN SERPL-MCNC: 31 MG/DL
CALCIUM SERPL-MCNC: 9.3 MG/DL
CHLORIDE SERPL-SCNC: 105 MMOL/L
CO2 SERPL-SCNC: 21 MMOL/L
CREAT SERPL-MCNC: 1.27 MG/DL
EOSINOPHIL # BLD AUTO: 0.02 K/UL
EOSINOPHIL NFR BLD AUTO: 2 %
GLUCOSE SERPL-MCNC: 92 MG/DL
HCT VFR BLD CALC: 30.4 %
HGB BLD-MCNC: 8.8 G/DL
LYMPHOCYTES # BLD AUTO: 1.31 K/UL
LYMPHOCYTES NFR BLD AUTO: 14 %
MAGNESIUM SERPL-MCNC: 2 MG/DL
MAN DIFF?: YES
MCHC RBC-ENTMCNC: 27.2 PG
MCHC RBC-ENTMCNC: 29.1 GM/DL
MCV RBC AUTO: 93.4 FL
MONOCYTES # BLD AUTO: 4.2 K/UL
MONOCYTES NFR BLD AUTO: 34 %
NEUTROPHILS # BLD AUTO: 3.48 K/UL
NEUTROPHILS NFR BLD AUTO: 45 %
PLATELET # BLD AUTO: 478 K/UL
POTASSIUM SERPL-SCNC: 5.6 MMOL/L
PROT SERPL-MCNC: 7.4 G/DL
RBC # BLD: 3.25 M/UL
RBC # FLD: 21.2 %
SODIUM SERPL-SCNC: 139 MMOL/L
TACROLIMUS SERPL-MCNC: 7.7 NG/ML
WBC # FLD AUTO: 9.04 K/UL

## 2018-09-05 ENCOUNTER — INPATIENT (INPATIENT)
Facility: HOSPITAL | Age: 68
LOS: 20 days | Discharge: NURSING, REHAB OR LONG-TERM CARE | DRG: 371 | End: 2018-09-26
Attending: THORACIC SURGERY (CARDIOTHORACIC VASCULAR SURGERY) | Admitting: INTERNAL MEDICINE
Payer: MEDICARE

## 2018-09-05 ENCOUNTER — APPOINTMENT (OUTPATIENT)
Dept: CARDIOLOGY | Facility: CLINIC | Age: 68
End: 2018-09-05
Payer: MEDICARE

## 2018-09-05 ENCOUNTER — APPOINTMENT (OUTPATIENT)
Dept: CV DIAGNOSITCS | Facility: HOSPITAL | Age: 68
End: 2018-09-05

## 2018-09-05 ENCOUNTER — LABORATORY RESULT (OUTPATIENT)
Age: 68
End: 2018-09-05

## 2018-09-05 ENCOUNTER — RESULT REVIEW (OUTPATIENT)
Age: 68
End: 2018-09-05

## 2018-09-05 VITALS
DIASTOLIC BLOOD PRESSURE: 82 MMHG | RESPIRATION RATE: 22 BRPM | HEART RATE: 130 BPM | SYSTOLIC BLOOD PRESSURE: 142 MMHG | OXYGEN SATURATION: 100 % | TEMPERATURE: 99.9 F

## 2018-09-05 VITALS
OXYGEN SATURATION: 100 % | HEART RATE: 133 BPM | DIASTOLIC BLOOD PRESSURE: 82 MMHG | WEIGHT: 156.09 LBS | SYSTOLIC BLOOD PRESSURE: 142 MMHG | TEMPERATURE: 100 F | HEIGHT: 68 IN | RESPIRATION RATE: 17 BRPM

## 2018-09-05 DIAGNOSIS — K73.2 CHRONIC ACTIVE HEPATITIS, NOT ELSEWHERE CLASSIFIED: ICD-10-CM

## 2018-09-05 DIAGNOSIS — D64.9 ANEMIA, UNSPECIFIED: ICD-10-CM

## 2018-09-05 DIAGNOSIS — R65.10 SYSTEMIC INFLAMMATORY RESPONSE SYNDROME (SIRS) OF NON-INFECTIOUS ORIGIN WITHOUT ACUTE ORGAN DYSFUNCTION: ICD-10-CM

## 2018-09-05 DIAGNOSIS — Z94.1 HEART TRANSPLANT STATUS: Chronic | ICD-10-CM

## 2018-09-05 DIAGNOSIS — Z94.1 HEART TRANSPLANT STATUS: ICD-10-CM

## 2018-09-05 DIAGNOSIS — A04.72 ENTEROCOLITIS DUE TO CLOSTRIDIUM DIFFICILE, NOT SPECIFIED AS RECURRENT: ICD-10-CM

## 2018-09-05 DIAGNOSIS — B49 UNSPECIFIED MYCOSIS: ICD-10-CM

## 2018-09-05 DIAGNOSIS — Z98.890 OTHER SPECIFIED POSTPROCEDURAL STATES: Chronic | ICD-10-CM

## 2018-09-05 LAB
ALBUMIN SERPL ELPH-MCNC: 3.5 G/DL — SIGNIFICANT CHANGE UP (ref 3.3–5)
ALBUMIN SERPL ELPH-MCNC: 3.9 G/DL
ALP BLD-CCNC: 90 U/L
ALP SERPL-CCNC: 82 U/L — SIGNIFICANT CHANGE UP (ref 40–120)
ALT FLD-CCNC: 9 U/L — LOW (ref 10–45)
ALT SERPL-CCNC: 10 U/L
ANION GAP SERPL CALC-SCNC: 12 MMOL/L — SIGNIFICANT CHANGE UP (ref 5–17)
ANION GAP SERPL CALC-SCNC: 16 MMOL/L
ANISOCYTOSIS BLD QL: SIGNIFICANT CHANGE UP
APTT BLD: 31.9 SEC — SIGNIFICANT CHANGE UP (ref 27.5–37.4)
AST SERPL-CCNC: 29 U/L — SIGNIFICANT CHANGE UP (ref 10–40)
AST SERPL-CCNC: 32 U/L
BASOPHILS # BLD AUTO: 0 K/UL — SIGNIFICANT CHANGE UP (ref 0–0.2)
BASOPHILS NFR BLD AUTO: 0 % — SIGNIFICANT CHANGE UP (ref 0–2)
BILIRUB SERPL-MCNC: 0.8 MG/DL
BILIRUB SERPL-MCNC: 0.9 MG/DL — SIGNIFICANT CHANGE UP (ref 0.2–1.2)
BLD GP AB SCN SERPL QL: NEGATIVE — SIGNIFICANT CHANGE UP
BUN SERPL-MCNC: 38 MG/DL
BUN SERPL-MCNC: 39 MG/DL — HIGH (ref 7–23)
CALCIUM SERPL-MCNC: 8.9 MG/DL
CALCIUM SERPL-MCNC: 8.9 MG/DL — SIGNIFICANT CHANGE UP (ref 8.4–10.5)
CHLORIDE SERPL-SCNC: 103 MMOL/L
CHLORIDE SERPL-SCNC: 103 MMOL/L — SIGNIFICANT CHANGE UP (ref 96–108)
CO2 SERPL-SCNC: 19 MMOL/L
CO2 SERPL-SCNC: 20 MMOL/L — LOW (ref 22–31)
CREAT SERPL-MCNC: 1.5 MG/DL
CREAT SERPL-MCNC: 1.5 MG/DL — HIGH (ref 0.5–1.3)
DACRYOCYTES BLD QL SMEAR: SLIGHT — SIGNIFICANT CHANGE UP
ELLIPTOCYTES BLD QL SMEAR: SLIGHT — SIGNIFICANT CHANGE UP
EOSINOPHIL # BLD AUTO: 0 K/UL — SIGNIFICANT CHANGE UP (ref 0–0.5)
EOSINOPHIL NFR BLD AUTO: 0 % — SIGNIFICANT CHANGE UP (ref 0–6)
GLUCOSE BLDC GLUCOMTR-MCNC: 75 MG/DL — SIGNIFICANT CHANGE UP (ref 70–99)
GLUCOSE BLDC GLUCOMTR-MCNC: 92 MG/DL — SIGNIFICANT CHANGE UP (ref 70–99)
GLUCOSE SERPL-MCNC: 98 MG/DL — SIGNIFICANT CHANGE UP (ref 70–99)
GLUCOSE SERPL-MCNC: 99 MG/DL
HCT VFR BLD CALC: 26.9 % — LOW (ref 39–50)
HGB BLD-MCNC: 7.6 G/DL — LOW (ref 13–17)
HYPOCHROMIA BLD QL: SLIGHT — SIGNIFICANT CHANGE UP
INR BLD: 1.51 RATIO — HIGH (ref 0.88–1.16)
LACTATE SERPL-SCNC: 0.6 MMOL/L — LOW (ref 0.7–2)
LYMPHOCYTES # BLD AUTO: 0.8 K/UL — LOW (ref 1–3.3)
LYMPHOCYTES # BLD AUTO: 11 % — LOW (ref 13–44)
MACROCYTES BLD QL: SLIGHT — SIGNIFICANT CHANGE UP
MCHC RBC-ENTMCNC: 25.5 PG — LOW (ref 27–34)
MCHC RBC-ENTMCNC: 28.2 GM/DL — LOW (ref 32–36)
MCV RBC AUTO: 90.4 FL — SIGNIFICANT CHANGE UP (ref 80–100)
MICROCYTES BLD QL: SLIGHT — SIGNIFICANT CHANGE UP
MONOCYTES # BLD AUTO: SIGNIFICANT CHANGE UP (ref 0–0.9)
MONOCYTES NFR BLD AUTO: 37 % — HIGH (ref 2–14)
NEUTROPHILS # BLD AUTO: 4.4 K/UL — SIGNIFICANT CHANGE UP (ref 1.8–7.4)
NEUTROPHILS NFR BLD AUTO: 50 % — SIGNIFICANT CHANGE UP (ref 43–77)
NEUTS BAND # BLD: 2 % — SIGNIFICANT CHANGE UP (ref 0–8)
OVALOCYTES BLD QL SMEAR: SLIGHT — SIGNIFICANT CHANGE UP
PLAT MORPH BLD: NORMAL — SIGNIFICANT CHANGE UP
PLATELET # BLD AUTO: 340 K/UL — SIGNIFICANT CHANGE UP (ref 150–400)
POIKILOCYTOSIS BLD QL AUTO: SLIGHT — SIGNIFICANT CHANGE UP
POLYCHROMASIA BLD QL SMEAR: SLIGHT — SIGNIFICANT CHANGE UP
POTASSIUM SERPL-MCNC: 4.1 MMOL/L — SIGNIFICANT CHANGE UP (ref 3.5–5.3)
POTASSIUM SERPL-SCNC: 4.1 MMOL/L — SIGNIFICANT CHANGE UP (ref 3.5–5.3)
POTASSIUM SERPL-SCNC: 4.4 MMOL/L
PROT SERPL-MCNC: 6.6 G/DL — SIGNIFICANT CHANGE UP (ref 6–8.3)
PROT SERPL-MCNC: 7.2 G/DL
PROTHROM AB SERPL-ACNC: 16.4 SEC — HIGH (ref 9.8–12.7)
RBC # BLD: 2.98 M/UL — LOW (ref 4.2–5.8)
RBC # FLD: 20.9 % — HIGH (ref 10.3–14.5)
RBC BLD AUTO: ABNORMAL
RH IG SCN BLD-IMP: POSITIVE — SIGNIFICANT CHANGE UP
SCHISTOCYTES BLD QL AUTO: SLIGHT — SIGNIFICANT CHANGE UP
SODIUM SERPL-SCNC: 135 MMOL/L — SIGNIFICANT CHANGE UP (ref 135–145)
SODIUM SERPL-SCNC: 138 MMOL/L
TACROLIMUS SERPL-MCNC: 15.9 NG/ML
TACROLIMUS SERPL-MCNC: 29.5 NG/ML — SIGNIFICANT CHANGE UP
URATE SERPL-MCNC: 7.3 MG/DL — SIGNIFICANT CHANGE UP (ref 3.4–8.8)
WBC # BLD: 9.8 K/UL — SIGNIFICANT CHANGE UP (ref 3.8–10.5)
WBC # FLD AUTO: 9.8 K/UL — SIGNIFICANT CHANGE UP (ref 3.8–10.5)

## 2018-09-05 PROCEDURE — 99215 OFFICE O/P EST HI 40 MIN: CPT | Mod: PD

## 2018-09-05 PROCEDURE — 99223 1ST HOSP IP/OBS HIGH 75: CPT

## 2018-09-05 PROCEDURE — 93505 ENDOMYOCARDIAL BIOPSY: CPT | Mod: 26

## 2018-09-05 PROCEDURE — 93306 TTE W/DOPPLER COMPLETE: CPT | Mod: 26

## 2018-09-05 PROCEDURE — 88342 IMHCHEM/IMCYTCHM 1ST ANTB: CPT | Mod: 26

## 2018-09-05 PROCEDURE — 74176 CT ABD & PELVIS W/O CONTRAST: CPT | Mod: 26

## 2018-09-05 PROCEDURE — 99222 1ST HOSP IP/OBS MODERATE 55: CPT

## 2018-09-05 PROCEDURE — 71250 CT THORAX DX C-: CPT | Mod: 26

## 2018-09-05 PROCEDURE — 71046 X-RAY EXAM CHEST 2 VIEWS: CPT | Mod: 26

## 2018-09-05 PROCEDURE — 99152 MOD SED SAME PHYS/QHP 5/>YRS: CPT

## 2018-09-05 PROCEDURE — 76937 US GUIDE VASCULAR ACCESS: CPT | Mod: 26

## 2018-09-05 PROCEDURE — 93010 ELECTROCARDIOGRAM REPORT: CPT

## 2018-09-05 PROCEDURE — 88307 TISSUE EXAM BY PATHOLOGIST: CPT | Mod: 26

## 2018-09-05 PROCEDURE — 88346 IMFLUOR 1ST 1ANTB STAIN PX: CPT | Mod: 26

## 2018-09-05 PROCEDURE — 99221 1ST HOSP IP/OBS SF/LOW 40: CPT

## 2018-09-05 RX ORDER — VANCOMYCIN HCL 1 G
125 VIAL (EA) INTRAVENOUS EVERY 6 HOURS
Qty: 0 | Refills: 0 | Status: DISCONTINUED | OUTPATIENT
Start: 2018-09-05 | End: 2018-09-13

## 2018-09-05 RX ORDER — GLUCAGON INJECTION, SOLUTION 0.5 MG/.1ML
1 INJECTION, SOLUTION SUBCUTANEOUS ONCE
Qty: 0 | Refills: 0 | Status: DISCONTINUED | OUTPATIENT
Start: 2018-09-05 | End: 2018-09-26

## 2018-09-05 RX ORDER — INSULIN LISPRO 100/ML
VIAL (ML) SUBCUTANEOUS AT BEDTIME
Qty: 0 | Refills: 0 | Status: DISCONTINUED | OUTPATIENT
Start: 2018-09-05 | End: 2018-09-26

## 2018-09-05 RX ORDER — DEXTROSE 50 % IN WATER 50 %
25 SYRINGE (ML) INTRAVENOUS ONCE
Qty: 0 | Refills: 0 | Status: DISCONTINUED | OUTPATIENT
Start: 2018-09-05 | End: 2018-09-26

## 2018-09-05 RX ORDER — VORICONAZOLE 10 MG/ML
200 INJECTION, POWDER, LYOPHILIZED, FOR SOLUTION INTRAVENOUS EVERY 12 HOURS
Qty: 0 | Refills: 0 | Status: DISCONTINUED | OUTPATIENT
Start: 2018-09-05 | End: 2018-09-14

## 2018-09-05 RX ORDER — SODIUM BICARBONATE 1 MEQ/ML
650 SYRINGE (ML) INTRAVENOUS
Qty: 0 | Refills: 0 | Status: DISCONTINUED | OUTPATIENT
Start: 2018-09-05 | End: 2018-09-26

## 2018-09-05 RX ORDER — INSULIN LISPRO 100/ML
VIAL (ML) SUBCUTANEOUS
Qty: 0 | Refills: 0 | Status: DISCONTINUED | OUTPATIENT
Start: 2018-09-05 | End: 2018-09-26

## 2018-09-05 RX ORDER — TACROLIMUS 5 MG/1
1 CAPSULE ORAL
Qty: 0 | Refills: 0 | Status: DISCONTINUED | OUTPATIENT
Start: 2018-09-05 | End: 2018-09-05

## 2018-09-05 RX ORDER — MAGNESIUM OXIDE 400 MG ORAL TABLET 241.3 MG
400 TABLET ORAL
Qty: 0 | Refills: 0 | Status: DISCONTINUED | OUTPATIENT
Start: 2018-09-05 | End: 2018-09-05

## 2018-09-05 RX ORDER — ASPIRIN/CALCIUM CARB/MAGNESIUM 324 MG
81 TABLET ORAL DAILY
Qty: 0 | Refills: 0 | Status: DISCONTINUED | OUTPATIENT
Start: 2018-09-05 | End: 2018-09-26

## 2018-09-05 RX ORDER — SODIUM CHLORIDE 9 MG/ML
1000 INJECTION INTRAMUSCULAR; INTRAVENOUS; SUBCUTANEOUS
Qty: 0 | Refills: 0 | Status: DISCONTINUED | OUTPATIENT
Start: 2018-09-05 | End: 2018-09-12

## 2018-09-05 RX ORDER — ATOVAQUONE 750 MG/5ML
1500 SUSPENSION ORAL DAILY
Qty: 0 | Refills: 0 | Status: DISCONTINUED | OUTPATIENT
Start: 2018-09-05 | End: 2018-09-14

## 2018-09-05 RX ORDER — DEXTROSE 50 % IN WATER 50 %
15 SYRINGE (ML) INTRAVENOUS ONCE
Qty: 0 | Refills: 0 | Status: DISCONTINUED | OUTPATIENT
Start: 2018-09-05 | End: 2018-09-26

## 2018-09-05 RX ORDER — SODIUM CHLORIDE 9 MG/ML
1000 INJECTION, SOLUTION INTRAVENOUS
Qty: 0 | Refills: 0 | Status: DISCONTINUED | OUTPATIENT
Start: 2018-09-05 | End: 2018-09-26

## 2018-09-05 RX ORDER — FAMOTIDINE 10 MG/ML
20 INJECTION INTRAVENOUS DAILY
Qty: 0 | Refills: 0 | Status: DISCONTINUED | OUTPATIENT
Start: 2018-09-05 | End: 2018-09-26

## 2018-09-05 RX ORDER — SITAGLIPTIN 50 MG/1
50 TABLET, FILM COATED ORAL DAILY
Qty: 30 | Refills: 4 | Status: COMPLETED | COMMUNITY
Start: 2018-07-30 | End: 2018-09-05

## 2018-09-05 RX ORDER — DEXTROSE 50 % IN WATER 50 %
12.5 SYRINGE (ML) INTRAVENOUS ONCE
Qty: 0 | Refills: 0 | Status: DISCONTINUED | OUTPATIENT
Start: 2018-09-05 | End: 2018-09-26

## 2018-09-05 RX ORDER — REPAGLINIDE 1 MG/1
0.5 TABLET ORAL THREE TIMES A DAY
Qty: 0 | Refills: 0 | Status: DISCONTINUED | OUTPATIENT
Start: 2018-09-05 | End: 2018-09-05

## 2018-09-05 RX ORDER — SODIUM CHLORIDE 9 MG/ML
3 INJECTION INTRAMUSCULAR; INTRAVENOUS; SUBCUTANEOUS EVERY 8 HOURS
Qty: 0 | Refills: 0 | Status: DISCONTINUED | OUTPATIENT
Start: 2018-09-05 | End: 2018-09-26

## 2018-09-05 RX ORDER — SILVER SULFADIAZINE 10 MG/G
1 CREAM TOPICAL TWICE DAILY
Refills: 0 | Status: COMPLETED | COMMUNITY
Start: 2018-04-10 | End: 2018-09-05

## 2018-09-05 RX ORDER — METRONIDAZOLE 500 MG
500 TABLET ORAL EVERY 8 HOURS
Qty: 0 | Refills: 0 | Status: DISCONTINUED | OUTPATIENT
Start: 2018-09-05 | End: 2018-09-12

## 2018-09-05 RX ORDER — VANCOMYCIN HCL 1 G
125 VIAL (EA) INTRAVENOUS EVERY 12 HOURS
Qty: 0 | Refills: 0 | Status: DISCONTINUED | OUTPATIENT
Start: 2018-09-05 | End: 2018-09-05

## 2018-09-05 RX ORDER — TACROLIMUS 1 MG/1
1 CAPSULE ORAL AT BEDTIME
Refills: 0 | Status: COMPLETED | COMMUNITY
Start: 2018-06-28 | End: 2018-09-05

## 2018-09-05 RX ORDER — TACROLIMUS 5 MG/1
1 CAPSULE ORAL
Qty: 0 | Refills: 0 | Status: DISCONTINUED | OUTPATIENT
Start: 2018-09-05 | End: 2018-09-06

## 2018-09-05 RX ADMIN — Medication 125 MILLIGRAM(S): at 18:50

## 2018-09-05 RX ADMIN — SODIUM CHLORIDE 3 MILLILITER(S): 9 INJECTION INTRAMUSCULAR; INTRAVENOUS; SUBCUTANEOUS at 20:20

## 2018-09-05 RX ADMIN — TACROLIMUS 1 MILLIGRAM(S): 5 CAPSULE ORAL at 20:12

## 2018-09-05 RX ADMIN — SODIUM CHLORIDE 100 MILLILITER(S): 9 INJECTION INTRAMUSCULAR; INTRAVENOUS; SUBCUTANEOUS at 17:33

## 2018-09-05 RX ADMIN — SODIUM CHLORIDE 3 MILLILITER(S): 9 INJECTION INTRAMUSCULAR; INTRAVENOUS; SUBCUTANEOUS at 18:51

## 2018-09-05 RX ADMIN — Medication 20 MILLIGRAM(S): at 23:26

## 2018-09-05 RX ADMIN — Medication 650 MILLIGRAM(S): at 20:25

## 2018-09-05 RX ADMIN — Medication 100 MILLIGRAM(S): at 23:26

## 2018-09-05 RX ADMIN — Medication 125 MILLIGRAM(S): at 23:26

## 2018-09-05 RX ADMIN — VORICONAZOLE 200 MILLIGRAM(S): 10 INJECTION, POWDER, LYOPHILIZED, FOR SOLUTION INTRAVENOUS at 18:50

## 2018-09-05 NOTE — PROGRESS NOTE ADULT - PROBLEM SELECTOR PLAN 1
Continue tacrolimus 1mg BID, check daily tacro level at 0730.  Noted tacrolimus level of 29.5 was drawn after dose was given.  Continue prednisone 3mg daily, mepron 1500mg daily, voriconazole 200 BID.  Continue pepcid, calcium, MVI, sodium bicarb BID.  Continue asa and statin.  Check daily magnesium level, supplement prn  Check CMV PCR

## 2018-09-05 NOTE — CONSULT NOTE ADULT - PROBLEM SELECTOR RECOMMENDATION 9
- Appreciate ID input  - BCx, CT Chest/A/P, C. diff, GI PCR, CMV to be sent - Appreciate ID input  - BCx, CT Chest/A/P with PO contrast, C. diff PCR, GI PCR, CMV to be sent  - Please consult GI to evaluate for colonoscopy with bx to assess for CMV colitis

## 2018-09-05 NOTE — H&P CARDIOLOGY - PSH
AICD (Automatic Cardioverter/Defibrillator) Present  [employer] with 1 [employer] lead4/1/09- explanted and replaced with [employer]tronic 2 leads on 9/2/09  H/O heart transplant  2/2018  History of Prior Ablation Treatment  for afib  S/P right heart catheterization  biopsy multiple  Status post left hip replacement AICD (Automatic Cardioverter/Defibrillator) Present  [employer] with 1 [employer] lead4/1/09- explanted and replaced with [employer]tronic 2 leads on 9/2/09  H/O heart transplant  2/2018  History of Prior Ablation Treatment  for afib  S/P right heart catheterization  biopsy multiple  Status post left hip replacement

## 2018-09-05 NOTE — CONSULT NOTE ADULT - ATTENDING COMMENTS
F/u CT chest/abdomen/pelvis.  F/u stool studies, blood cxs.  Start IV Flagyl. 68 year old male s/p OHT 2/23/18, post op course complicated by multiple infections, currently on modified immunosuppressive regimen.    He is readmitted this time for diarrhea, low grade temps as well as some lethargy. He had been on treatment for C.Diff as outpatient. Diarrhea occurs about 3-4 times x day, since recent discharge on 8/29. Underwent     Active issues  SIRS  Diarrhea r/o CMV colitis vs Cdiff  s/p OHT on 2/23/18  (CMV D-/R+, Toxo D-/R+, HCV+ viremic donor)- off cellcept due to infections; off valcyte due to leukopenia  hx of AMR s/p rituximab + plasmapheresis  RTA IV due to tacro on sodium bicarb  s/p Hep C treatment  s/p LVAD explant  hx of NICM  hx of PNA (treated for fungal pneumonia, as well as ?pseudomona  Anemia    RHC today RA 4, W5, PASAT 60%, CO 4+    lethargic, arousable, no focal deficits, tachycardic, no jvd on exam, ++ increased bowel sounds, no rebound tenderness, no tenderness on palpation, soft abdomen; lung exam w normal breath sounds on left, right lung w decreased airway entry, transmitted breath sounds; right wrist with effusion and tenderness, no increased warmth; right ankle with mild effusion, non tender    His sensorium is mildly compromised likely due to infection, agree with broad spectrum antibiotics, would have a low threshold for colonoscopy  His filling pressures were low which point away from rejection (given diarrhea, immunosuppression could have been running low)    - F/u CT chest/abdomen/pelvis.  - F/u stool studies, blood cxs.  - atbx per ID  - GI consult  - check cmv pcr, cdiff pcr  - check TTE  - recheck fungitell  - IVF  - check FOB  - check lfts  - no magnesium tablets  - immunosuppression  continue tacrolimus 1/1, goal 8-10 (level today drawn after he took his am dose, not true trough)  off cellcept  prednisone per home dose  mepron  off valcyte

## 2018-09-05 NOTE — PATIENT PROFILE ADULT. - PSH
AICD (Automatic Cardioverter/Defibrillator) Present  [employer] with 1 [employer] lead4/1/09- explanted and replaced with [employer]tronic 2 leads on 9/2/09  H/O heart transplant  2/2018  History of Prior Ablation Treatment  for afib  LVAD (left ventricular assist device) present    Status post left hip replacement

## 2018-09-05 NOTE — PROGRESS NOTE ADULT - ASSESSMENT
69 y/o male with PMH NICM HFrEF s/p HM2 LVAD 6/2017, who underwent heart transplant on 2/23/18 (CMV D-/R+ and Toxo D-/R+, Hep C+ heart) with post op graft dysfunction who underwent plasmapheresis and IVIG x2 as well as rituximab, with suspected fungal PNA diagnosed 6/2018  treated w voriconazole, recurrent PNA with new RUL infiltrate on CT scan 8/18, which  improved on broad spectrum antibiotics. S/p lung biopsy 8/18. C diff toxin positive -still  on oral vanco,.  Patient presents today for RHC and cardiac biopsy with c/o ongoing diarrhea (3-4 loose BM daily) and mildly productive cough, denies fevers, chills, abd pain, N/V. EKG demonstrated ST at 133bpm. Pt readmitted for further workup. 67 y/o male with PMH NICM HFrEF s/p HM2 LVAD 6/2017, who underwent heart transplant on 2/23/18 (CMV D-/R+ and Toxo D-/R+, Hep C+ heart) with post op graft dysfunction who underwent plasmapheresis and IVIG x2 as well as rituximab, with suspected fungal PNA diagnosed 6/2018  treated w voriconazole, recurrent PNA with new RUL infiltrate on CT scan 8/18, which  improved on broad spectrum antibiotics. S/p lung biopsy 8/18. C diff toxin positive -still  on oral vanco,.  Patient presents today for RHC and cardiac biopsy with c/o ongoing diarrhea (3-4 loose BM daily) and mildly productive cough, denies fevers, chills, abd pain, N/V. EKG demonstrated ST at 133bpm. Pt readmitted for further workup.

## 2018-09-05 NOTE — CONSULT NOTE ADULT - SUBJECTIVE AND OBJECTIVE BOX
Patient is a 68y old  Male who presents with a chief complaint of     HPI:  68 year old man PMH NICM HFrEF s/p HM2 LVAD 6/2017, who underwent heart transplant on 2/23/18 (CMV D-/R+ and Toxo D-/R+, Hep C+ heart) with post op graft dysfunction who underwent plasmapheresis and IVIG x2 as well as rituximab, with suspected fungal PNA diagnosed 6/2018  treated w voriconazole, recurrent PNA with new RUL infiltrate on CT scan 8/18, which  improved on broad spectrum antibiotics. S/p lung biopsy 8/18. C diff toxin positive -still  on oral vanco,.  Presents today for RHC and biopsy. Temp on arrival 99.9 orally with associated cough productive of clear sputum + mild dyspnea at rest.  WCC pending.   Also right hand swelling + warmth noted - states present for 4 days.  Wrist and joint tenderness over 4th + 5th digits.     Dr Wooten notified. (05 Sep 2018 11:59)      PAST MEDICAL & SURGICAL HISTORY:  HLD (hyperlipidemia)  Former smoker  DVT of upper extremity (deep vein thrombosis)  Hepatitis C virus  GIB (gastrointestinal bleeding)  Ventricular fibrillation: s/p AICD  PAF (paroxysmal atrial fibrillation): on xarelto  Non-Ischemic Cardiomyopathy  SVT (Supraventricular Tachycardia)  HTN  CHF (Congestive Heart Failure)  S/P right heart catheterization: biopsy multiple  H/O heart transplant: 2/2018  Status post left hip replacement  History of Prior Ablation Treatment: for afib  AICD (Automatic Cardioverter/Defibrillator) Present: [employer] with 1 [employer] lead4/1/09- explanted and replaced with [employer] 2 leads on 9/2/09      Allergies  No Known Allergies        ANTIMICROBIALS:      OTHER MEDS: MEDICATIONS  (STANDING):      SOCIAL HISTORY:       FAMILY HISTORY:  No pertinent family history in first degree relatives      REVIEW OF SYSTEMS  [  ] ROS unobtainable because:    [  ] All other systems negative except as noted below:	    Constitutional:  [ ] fever [ ] chills  [ ] weight loss  [ ] weakness  Skin:  [ ] rash [ ] phlebitis	  Eyes: [ ] icterus [ ] pain  [ ] discharge	  ENMT: [ ] sore throat  [ ] thrush [ ] ulcers [ ] exudates  Respiratory: [ ] dyspnea [ ] hemoptysis [ ] cough [ ] sputum	  Cardiovascular:  [ ] chest pain [ ] palpitations [ ] edema	  Gastrointestinal:  [ ] nausea [ ] vomiting [ ] diarrhea [ ] constipation [ ] pain	  Genitourinary:  [ ] dysuria [ ] frequency [ ] hematuria [ ] discharge [ ] flank pain  [ ] incontinence  Musculoskeletal:  [ ] myalgias [ ] arthralgias [ ] arthritis  [ ] back pain  Neurological:  [ ] headache [ ] seizures  [ ] confusion/altered mental status  Psychiatric:  [ ] anxiety [ ] depression	  Hematology/Lymphatics:  [ ] lymphadenopathy  Endocrine:  [ ] adrenal [ ] thyroid  Allergic/Immunologic:	 [ ] transplant [ ] seasonal    Vital Signs Last 24 Hrs  T(F): 99.9 (09-05-18 @ 11:59), Max: 99.9 (09-05-18 @ 11:59)    Vital Signs Last 24 Hrs  HR: 133 (09-05-18 @ 11:59) (133 - 133)  BP: 142/82 (09-05-18 @ 11:59) (142/82 - 142/82)  RR: 17 (09-05-18 @ 11:59)  SpO2: 100% (09-05-18 @ 11:59) (100% - 100%)  Wt(kg): --    PHYSICAL EXAM:  General: non-toxic  HEAD/EYES: anicteric, PERRL  ENT:  supple  Cardiovascular:   S1, S2  Respiratory:  clear bilaterally  GI:  soft, non-tender, normal bowel sounds  :  no CVA tenderness   Musculoskeletal:  no synovitis  Neurologic:  grossly non-focal  Skin:  no rash  Lymph: no lymphadenopathy  Psychiatric:  appropriate affect  Vascular:  no phlebitis                                7.6    9.8   )-----------( 340      ( 05 Sep 2018 12:10 )             26.9       MICROBIOLOGY:  Culture - Sputum . (06.20.18 @ 00:38)    Gram Stain:   Few Squamous epithelial cells per low power field  Few polymorphonuclear leukocytes per low power field  Moderate Gram variable coccobacilli per oil power field    -  Levofloxacin: S <=1    -  Meropenem: R 8    -  Ceftazidime: S 8    -  Cefepime: S 4    -  Piperacillin/Tazobactam: I 32    -  Tobramycin: S <=2    -  Aztreonam: S 8    -  Ciprofloxacin: S <=0.5    -  Imipenem: R >8    -  Gentamicin: S 4    -  Amikacin: S <=8    Specimen Source: .Sputum Sputum    Culture Results:   Rare Pseudomonas aeruginosa (Carbapenem Resistant)  Normal Respiratory Heaven present    Organism Identification: Pseudomonas aeruginosa (Carbapenem Resistant)    Organism: Pseudomonas aeruginosa (Carbapenem Resistant)    Method Type: KAMILA      Toxoplasma IgG Screen: 4.7 IU/mL (06-14-18 @ 09:42)  HIV-1 RNA Quantitative, Viral Load Log: NOT DET. lg /mL (05-31-18 @ 19:10)  CMV IgG Antibody: 5.40 U/mL (05-25-18 @ 17:51)  CMV IgG Antibody: >10.00 U/mL (02-22-18 @ 23:45)  HIV-1 RNA Quantitative, Viral Load Log: NOT DET. lg /mL (02-02-18 @ 19:25)    C. difficile GDH &amp; toxins A/B by EIA . (08.18.18 @ 13:44)    Clostridium difficile GDH Toxins A&amp;B, EIA:   Positive    Clostridium difficile GDH Interpretation: Positive for toxigenic C. Difficile.  This specimen is positive for C.  Difficile glutamate dehydrogenase (GDH) antigen and positive for C.  Difficile Toxins A & B, by EIA.  GDH is a highly sensitive marker for C.  Difficile that is produced in largeamounts by all C. Difficile strains,  both toxigenic and nontoxigenic.  This assay has not been validated as a  test of cure.  The results of this assay should always be interpreted in  conjunction with patient's clinical history.        RADIOLOGY:  < from: CT Chest No Cont (08.27.18 @ 08:39) >  Impression: Nodular opacities in the right upper and left lower lobes   have decreased in size when compared to previous exams.    Calcified thrombus within the left subclavian vein, left brachiocephalic   vein as well as the superior vena cava.    < end of copied text >    imaging below personally reviewed Patient is a 68y old  Male who presents with a chief complaint of     HPI:  68 year old man PMH NICM HFrEF s/p HM2 LVAD 6/2017, who underwent heart transplant on 2/23/18 (CMV D-/R+ and Toxo D-/R+, Hep C+ heart) with post op graft dysfunction who underwent plasmapheresis and IVIG x2 as well as rituximab, with suspected fungal PNA diagnosed 6/2018  treated w voriconazole, recurrent PNA with new RUL infiltrate on CT scan 8/18, which  improved on broad spectrum antibiotics. S/p lung biopsy 8/18. C diff toxin positive -still  on oral vanco,.  Presents today for RHC and biopsy. Temp on arrival 99.9 orally with associated cough productive of clear sputum + mild dyspnea at rest.  WCC pending.   Also right hand swelling + warmth noted - states present for 4 days.  Wrist and joint tenderness over 4th + 5th digits.     Dr Wooten notified. (05 Sep 2018 11:59)      PAST MEDICAL & SURGICAL HISTORY:  HLD (hyperlipidemia)  Former smoker  DVT of upper extremity (deep vein thrombosis)  Hepatitis C virus  GIB (gastrointestinal bleeding)  Ventricular fibrillation: s/p AICD  PAF (paroxysmal atrial fibrillation): on xarelto  Non-Ischemic Cardiomyopathy  SVT (Supraventricular Tachycardia)  HTN  CHF (Congestive Heart Failure)  S/P right heart catheterization: biopsy multiple  H/O heart transplant: 2/2018  Status post left hip replacement  History of Prior Ablation Treatment: for afib  AICD (Automatic Cardioverter/Defibrillator) Present: [employer] with 1 [employer] lead4/1/09- explanted and replaced with [employer] 2 leads on 9/2/09      Allergies  No Known Allergies        ANTIMICROBIALS:    atovaquone Suspension 1500 milliGRAM(s) Oral daily  vancomycin    Solution 125 milliGRAM(s) Oral every 6 hours  voriconazole 200 milliGRAM(s) Oral every 12 hours    OTHER MEDS: MEDICATIONS  (STANDING):  tacrolimus 1 milliGRAM(s) Oral <User Schedule>  acetaminophen    Suspension 500 milliGRAM(s) Oral every 6 hours PRN  aspirin enteric coated 81 milliGRAM(s) Oral daily  Calcium Citrate + Vit D 315mg/250 IU 2 Tablet(s) 2 Tablet(s) Oral <User Schedule>  docusate sodium 100 milliGRAM(s) Oral two times a day PRN  famotidine    Tablet 20 milliGRAM(s) Oral daily  insulin lispro (HumaLOG) corrective regimen sliding scale   SubCutaneous three times a day before meals  insulin lispro (HumaLOG) corrective regimen sliding scale   SubCutaneous at bedtime  magnesium oxide 400 milliGRAM(s) Oral two times a day with meals  multivitamin 1 Tablet(s) Oral daily  pravastatin 20 milliGRAM(s) Oral at bedtime  predniSONE   Tablet 3 milliGRAM(s) Oral <User Schedule>  sodium bicarbonate 650 milliGRAM(s) Oral two times a day  sodium chloride 0.65% Nasal 1 Spray(s) Both Nostrils three times a day        SOCIAL HISTORY:     Denies smoking, etoh or drugs      FAMILY HISTORY:  No pertinent family history in first degree relatives      REVIEW OF SYSTEMS  [x  ] All other systems negative except as noted below:	    Constitutional:  [ ] fever [ ] chills  [ ] weight loss  [ ] weakness  Skin:  [ ] rash [ ] phlebitis	  Eyes: [ ] icterus [ ] pain  [ ] discharge	  ENMT: [ ] sore throat  [ ] thrush [ ] ulcers [ ] exudates  Respiratory: [ ] dyspnea [ ] hemoptysis [x ] cough [x ] sputum	  Cardiovascular:  [ ] chest pain [ ] palpitations [ ] edema	  Gastrointestinal:  [ ] nausea [ ] vomiting [ x] diarrhea [ ] constipation [ ] pain [x] decreased appetite	  Genitourinary:  [ ] dysuria [ ] frequency [ ] hematuria [ ] discharge [ ] flank pain  [ ] incontinence  Musculoskeletal:  [ ] myalgias [ ] arthralgias [ ] arthritis  [ ] back pain  Neurological:  [ ] headache [ ] seizures  [ ] confusion/altered mental status  Psychiatric:  [ ] anxiety [ ] depression	  Hematology/Lymphatics:  [ ] lymphadenopathy  Endocrine:  [ ] adrenal [ ] thyroid  Allergic/Immunologic:	 [ ] transplant [ ] seasonal    Vital Signs Last 24 Hrs  T(F): 99.9 (09-05-18 @ 11:59), Max: 99.9 (09-05-18 @ 11:59)    Vital Signs Last 24 Hrs  HR: 133 (09-05-18 @ 11:59) (133 - 133)  BP: 142/82 (09-05-18 @ 11:59) (142/82 - 142/82)  RR: 17 (09-05-18 @ 11:59)  SpO2: 100% (09-05-18 @ 11:59) (100% - 100%)  Wt(kg): --    PHYSICAL EXAM:  General: non-toxic  HEAD/EYES: anicteric, PERRL  ENT:  supple  Cardiovascular:   S1, S2, well healed surgical scar on chest  Respiratory:  clear bilaterally  GI:  soft, non-tender, normal bowel sounds  :  no CVA tenderness   Musculoskeletal:  no synovitis, swollen right wrist and abnormally appearing middle finger   Neurologic:  grossly non-focal  Skin:  no rash  Lymph: no lymphadenopathy  Psychiatric:  appropriate affect  Vascular:  no phlebitis                          7.6    9.8   )-----------( 340      ( 05 Sep 2018 12:10 )             26.9       MICROBIOLOGY:  Culture - Sputum . (06.20.18 @ 00:38)    Gram Stain:   Few Squamous epithelial cells per low power field  Few polymorphonuclear leukocytes per low power field  Moderate Gram variable coccobacilli per oil power field    -  Levofloxacin: S <=1    -  Meropenem: R 8    -  Ceftazidime: S 8    -  Cefepime: S 4    -  Piperacillin/Tazobactam: I 32    -  Tobramycin: S <=2    -  Aztreonam: S 8    -  Ciprofloxacin: S <=0.5    -  Imipenem: R >8    -  Gentamicin: S 4    -  Amikacin: S <=8    Specimen Source: .Sputum Sputum    Culture Results:   Rare Pseudomonas aeruginosa (Carbapenem Resistant)  Normal Respiratory Heaven present    Organism Identification: Pseudomonas aeruginosa (Carbapenem Resistant)    Organism: Pseudomonas aeruginosa (Carbapenem Resistant)    Method Type: KAMILA      Toxoplasma IgG Screen: 4.7 IU/mL (06-14-18 @ 09:42)  HIV-1 RNA Quantitative, Viral Load Log: NOT DET. lg /mL (05-31-18 @ 19:10)  CMV IgG Antibody: 5.40 U/mL (05-25-18 @ 17:51)  CMV IgG Antibody: >10.00 U/mL (02-22-18 @ 23:45)  HIV-1 RNA Quantitative, Viral Load Log: NOT DET. lg /mL (02-02-18 @ 19:25)    C. difficile GDH &amp; toxins A/B by EIA . (08.18.18 @ 13:44)    Clostridium difficile GDH Toxins A&amp;B, EIA:   Positive    Clostridium difficile GDH Interpretation: Positive for toxigenic C. Difficile.  This specimen is positive for C.  Difficile glutamate dehydrogenase (GDH) antigen and positive for C.  Difficile Toxins A & B, by EIA.  GDH is a highly sensitive marker for C.  Difficile that is produced in largeamounts by all C. Difficile strains,  both toxigenic and nontoxigenic.  This assay has not been validated as a  test of cure.  The results of this assay should always be interpreted in  conjunction with patient's clinical history.        RADIOLOGY:  < from: CT Chest No Cont (08.27.18 @ 08:39) >  Impression: Nodular opacities in the right upper and left lower lobes   have decreased in size when compared to previous exams.    Calcified thrombus within the left subclavian vein, left brachiocephalic   vein as well as the superior vena cava.    < end of copied text >    imaging below personally reviewed Patient is a 68y old  Male who presents with a chief complaint of diarrhea    HPI:  68 year old man PMH NICM HFrEF s/p HM2 LVAD 6/2017, who underwent heart transplant on 2/23/18 (CMV D-/R+ and Toxo D-/R+, Hep C+ heart) with post op graft dysfunction who underwent plasmapheresis and IVIG x2 as well as rituximab, with suspected fungal PNA diagnosed 6/2018  treated w voriconazole, recurrent PNA with new RUL infiltrate on CT scan 8/18, which  improved on broad spectrum antibiotics. S/p lung biopsy 8/18. C diff toxin positive -still  on oral vanco,.  Presents today for RHC and biopsy. Temp on arrival 99.9 orally with associated cough productive of clear sputum + mild dyspnea at rest.  WCC pending.   Also right hand swelling + warmth noted - states present for 4 days.  Wrist and joint tenderness over 4th + 5th digits.     Dr Wooten notified. (05 Sep 2018 11:59)      PAST MEDICAL & SURGICAL HISTORY:  HLD (hyperlipidemia)  Former smoker  DVT of upper extremity (deep vein thrombosis)  Hepatitis C virus  GIB (gastrointestinal bleeding)  Ventricular fibrillation: s/p AICD  PAF (paroxysmal atrial fibrillation): on xarelto  Non-Ischemic Cardiomyopathy  SVT (Supraventricular Tachycardia)  HTN  CHF (Congestive Heart Failure)  S/P right heart catheterization: biopsy multiple  H/O heart transplant: 2/2018  Status post left hip replacement  History of Prior Ablation Treatment: for afib  AICD (Automatic Cardioverter/Defibrillator) Present: [employer] with 1 [employer] lead4/1/09- explanted and replaced with [employer] 2 leads on 9/2/09      Allergies  No Known Allergies        ANTIMICROBIALS:    atovaquone Suspension 1500 milliGRAM(s) Oral daily  vancomycin    Solution 125 milliGRAM(s) Oral every 6 hours  voriconazole 200 milliGRAM(s) Oral every 12 hours    OTHER MEDS: MEDICATIONS  (STANDING):  tacrolimus 1 milliGRAM(s) Oral <User Schedule>  acetaminophen    Suspension 500 milliGRAM(s) Oral every 6 hours PRN  aspirin enteric coated 81 milliGRAM(s) Oral daily  Calcium Citrate + Vit D 315mg/250 IU 2 Tablet(s) 2 Tablet(s) Oral <User Schedule>  docusate sodium 100 milliGRAM(s) Oral two times a day PRN  famotidine    Tablet 20 milliGRAM(s) Oral daily  insulin lispro (HumaLOG) corrective regimen sliding scale   SubCutaneous three times a day before meals  insulin lispro (HumaLOG) corrective regimen sliding scale   SubCutaneous at bedtime  magnesium oxide 400 milliGRAM(s) Oral two times a day with meals  multivitamin 1 Tablet(s) Oral daily  pravastatin 20 milliGRAM(s) Oral at bedtime  predniSONE   Tablet 3 milliGRAM(s) Oral <User Schedule>  sodium bicarbonate 650 milliGRAM(s) Oral two times a day  sodium chloride 0.65% Nasal 1 Spray(s) Both Nostrils three times a day        SOCIAL HISTORY:     Denies smoking, etoh or drugs      FAMILY HISTORY:  No pertinent family history in first degree relatives      REVIEW OF SYSTEMS  [x  ] All other systems negative except as noted below:	    Constitutional:  [ ] fever [ ] chills  [ ] weight loss  [ ] weakness  Skin:  [ ] rash [ ] phlebitis	  Eyes: [ ] icterus [ ] pain  [ ] discharge	  ENMT: [ ] sore throat  [ ] thrush [ ] ulcers [ ] exudates  Respiratory: [ ] dyspnea [ ] hemoptysis [x ] cough [x ] sputum	Pt claims cough has improved somewhat  Cardiovascular:  [ ] chest pain [ ] palpitations [ ] edema	  Gastrointestinal:  [ ] nausea [ ] vomiting [ x] diarrhea [ ] constipation [ ] pain [x] decreased appetite	 Pt c/o watery diarrhea  Genitourinary:  [ ] dysuria [ ] frequency [ ] hematuria [ ] discharge [ ] flank pain  [ ] incontinence  Musculoskeletal:  [ ] myalgias [ ] arthralgias [ ] arthritis  [ ] back pain  Neurological:  [ ] headache [ ] seizures  [ ] confusion/altered mental status  Psychiatric:  [ ] anxiety [ ] depression	  Hematology/Lymphatics:  [ ] lymphadenopathy  Endocrine:  [ ] adrenal [ ] thyroid  Allergic/Immunologic:	 [ ] transplant [ ] seasonal    Vital Signs Last 24 Hrs  T(F): 99.9 (09-05-18 @ 11:59), Max: 99.9 (09-05-18 @ 11:59)    Vital Signs Last 24 Hrs  HR: 133 (09-05-18 @ 11:59) (133 - 133)  BP: 142/82 (09-05-18 @ 11:59) (142/82 - 142/82)  RR: 17 (09-05-18 @ 11:59)  SpO2: 100% (09-05-18 @ 11:59) (100% - 100%)  Wt(kg): --    PHYSICAL EXAM:  General: non-toxic  HEAD/EYES: anicteric, PERRL  ENT:  supple  Cardiovascular:   S1, S2, well healed surgical scar on chest  Respiratory:  clear bilaterally  GI:  soft, non-tender, normal bowel sounds  :  no CVA tenderness   Musculoskeletal:  no synovitis, swollen right wrist and abnormally appearing middle finger   Neurologic:  grossly non-focal  Skin:  no rash  Lymph: no lymphadenopathy  Psychiatric:  appropriate affect  Vascular:  no phlebitis                          7.6    9.8   )-----------( 340      ( 05 Sep 2018 12:10 )             26.9       MICROBIOLOGY:  Culture - Sputum . (06.20.18 @ 00:38)    Gram Stain:   Few Squamous epithelial cells per low power field  Few polymorphonuclear leukocytes per low power field  Moderate Gram variable coccobacilli per oil power field    -  Levofloxacin: S <=1    -  Meropenem: R 8    -  Ceftazidime: S 8    -  Cefepime: S 4    -  Piperacillin/Tazobactam: I 32    -  Tobramycin: S <=2    -  Aztreonam: S 8    -  Ciprofloxacin: S <=0.5    -  Imipenem: R >8    -  Gentamicin: S 4    -  Amikacin: S <=8    Specimen Source: .Sputum Sputum    Culture Results:   Rare Pseudomonas aeruginosa (Carbapenem Resistant)  Normal Respiratory Heaven present    Organism Identification: Pseudomonas aeruginosa (Carbapenem Resistant)    Organism: Pseudomonas aeruginosa (Carbapenem Resistant)    Method Type: KAMILA      Toxoplasma IgG Screen: 4.7 IU/mL (06-14-18 @ 09:42)  HIV-1 RNA Quantitative, Viral Load Log: NOT DET. lg /mL (05-31-18 @ 19:10)  CMV IgG Antibody: 5.40 U/mL (05-25-18 @ 17:51)  CMV IgG Antibody: >10.00 U/mL (02-22-18 @ 23:45)  HIV-1 RNA Quantitative, Viral Load Log: NOT DET. lg /mL (02-02-18 @ 19:25)    C. difficile GDH &amp; toxins A/B by EIA . (08.18.18 @ 13:44)    Clostridium difficile GDH Toxins A&amp;B, EIA:   Positive    Clostridium difficile GDH Interpretation: Positive for toxigenic C. Difficile.  This specimen is positive for C.  Difficile glutamate dehydrogenase (GDH) antigen and positive for C.  Difficile Toxins A & B, by EIA.  GDH is a highly sensitive marker for C.  Difficile that is produced in largeamounts by all C. Difficile strains,  both toxigenic and nontoxigenic.  This assay has not been validated as a  test of cure.  The results of this assay should always be interpreted in  conjunction with patient's clinical history.        RADIOLOGY:  < from: CT Chest No Cont (08.27.18 @ 08:39) >  Impression: Nodular opacities in the right upper and left lower lobes   have decreased in size when compared to previous exams.    Calcified thrombus within the left subclavian vein, left brachiocephalic   vein as well as the superior vena cava.    < end of copied text >    imaging below personally reviewed

## 2018-09-05 NOTE — H&P CARDIOLOGY - HISTORY OF PRESENT ILLNESS
68 year old man PMH NICM HFrEF s/p HM2 LVAD 6/2017, who underwent heart transplant on 2/23/18 (CMV D-/R+ and Toxo D-/R+, Hep C+ heart) with post op graft dysfunction who underwent plasmapheresis and IVIG x2 as well as rituximab, with suspected fungal PNA diagnosed 6/2018 on voriconazole who presented with fevers from outpt clinic appointment - secondary to recurrent PNA with new RUL infiltrate on CT scan, which is improving on broad spectrum antibiotics. S/p lung biopsy 8/18. C diff toxin positive - on oral vanco,.  Presents today for RHC and biopsy. Temp on arrival 99.9 orally with associated cough. 68 year old man PMH NICM HFrEF s/p HM2 LVAD 6/2017, who underwent heart transplant on 2/23/18 (CMV D-/R+ and Toxo D-/R+, Hep C+ heart) with post op graft dysfunction who underwent plasmapheresis and IVIG x2 as well as rituximab, with suspected fungal PNA diagnosed 6/2018 on voriconazole who presented with fevers from outpt clinic appointment - secondary to recurrent PNA with new RUL infiltrate on CT scan, which is improving on broad spectrum antibiotics. S/p lung biopsy 8/18. C diff toxin positive - on oral vanco,.  Presents today for RHC and biopsy. Temp on arrival 99.9 orally with associated cough productive of clear sputum. WCC pending. 68 year old man PMH NICM HFrEF s/p HM2 LVAD 6/2017, who underwent heart transplant on 2/23/18 (CMV D-/R+ and Toxo D-/R+, Hep C+ heart) with post op graft dysfunction who underwent plasmapheresis and IVIG x2 as well as rituximab, with suspected fungal PNA diagnosed 6/2018  treated w voriconazole, recurrent PNA with new RUL infiltrate on CT scan 8/18, which  improved on broad spectrum antibiotics. S/p lung biopsy 8/18. C diff toxin positive -still  on oral vanco,.  Presents today for RHC and biopsy. Temp on arrival 99.9 orally with associated cough productive of clear sputum. WCC pending. Also right hand swelling + warmth noted - states present since last admission and thinks r/t IV access site. Wrist and joint tenderness over 4th + 5th digits. 68 year old man PMH NICM HFrEF s/p HM2 LVAD 6/2017, who underwent heart transplant on 2/23/18 (CMV D-/R+ and Toxo D-/R+, Hep C+ heart) with post op graft dysfunction who underwent plasmapheresis and IVIG x2 as well as rituximab, with suspected fungal PNA diagnosed 6/2018  treated w voriconazole, recurrent PNA with new RUL infiltrate on CT scan 8/18, which  improved on broad spectrum antibiotics. S/p lung biopsy 8/18. C diff toxin positive -still  on oral vanco,.  Presents today for RHC and biopsy. Temp on arrival 99.9 orally with associated cough productive of clear sputum + mild dyspnea at rest.  WCC pending.   Also right hand swelling + warmth noted - states present for 4 days.  Wrist and joint tenderness over 4th + 5th digits.     Dr Wooten notified. 68 year old man PMH NICM HFrEF s/p HM2 LVAD 6/2017, who underwent heart transplant on 2/23/18 (CMV D-/R+ and Toxo D-/R+, Hep C+ heart) with post op graft dysfunction who underwent plasmapheresis and IVIG x2 as well as rituximab, with suspected fungal PNA diagnosed 6/2018  treated w voriconazole, recurrent PNA with new RUL infiltrate on CT scan 8/18, which  improved on broad spectrum antibiotics. S/p lung biopsy 8/18. C diff toxin positive -still  on oral vanco,.      Patient presents today for RHC and biopsy. Temp on arrival 99.9 orally (denies fevers at home) with associated cough productive of clear sputum + mild dyspnea at rest. Tachycardia HR 130s /82.  WCC pending.  Cxray ordered and pending.   Also right hand swelling + warmth noted - states present for 4 days.  Wrist and joint tenderness over 4th + 5th digits.  Blood cultures/Lactate +Uric acid ordered. In addition, patient reports watery stools x 4 today. Case discussed with Dr Campbell (pre procedure) and HF Attending Dr Wooten.  Will proceed with RHC and biopsy now. IV hydration initiated. Will require admission and ID consult called.

## 2018-09-05 NOTE — CONSULT NOTE ADULT - ASSESSMENT
69 y/o M with history of NICM now s/p heart transplantation on 2/23/18. 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 He has received therapy with IVIG, plamsapheresis, and rituximab. RHC today shows low normal filing pressures and high normal output. His presentation with low grade fevers, malaise, and persistent diarrhea concerning for infection, despite treatment for presumed fungal pneumonia and c.diff. Last CMV negative on 8/21. 67 y/o M with history of NICM now s/p heart transplantation on 2/23/18. 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 He has received therapy with IVIG, plamsapheresis, and rituximab. RHC today shows low normal filing pressures and high normal output. His presentation with low grade fevers, malaise, and persistent diarrhea concerning for recurrent infection, despite treatment for presumed fungal pneumonia and c.diff. Last CMV negative on 8/21.

## 2018-09-05 NOTE — H&P CARDIOLOGY - PMH
CHF (Congestive Heart Failure)    DVT of upper extremity (deep vein thrombosis)    GIB (gastrointestinal bleeding)    Hepatitis C virus    HTN    Non-Ischemic Cardiomyopathy    PAF (paroxysmal atrial fibrillation)  on xarelto  SVT (Supraventricular Tachycardia)    Ventricular fibrillation  s/p AICD CHF (Congestive Heart Failure)    DVT of upper extremity (deep vein thrombosis)    Former smoker    GIB (gastrointestinal bleeding)    Hepatitis C virus    HLD (hyperlipidemia)    HTN    Non-Ischemic Cardiomyopathy    PAF (paroxysmal atrial fibrillation)  on xarelto  SVT (Supraventricular Tachycardia)    Ventricular fibrillation  s/p AICD

## 2018-09-05 NOTE — CONSULT NOTE ADULT - SUBJECTIVE AND OBJECTIVE BOX
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           Allergies    No Known Allergies    Intolerances    	    MEDICATIONS:  aspirin enteric coated 81 milliGRAM(s) Oral daily    atovaquone Suspension 1500 milliGRAM(s) Oral daily  metroNIDAZOLE  IVPB 500 milliGRAM(s) IV Intermittent every 8 hours  vancomycin    Solution 125 milliGRAM(s) Oral every 6 hours  voriconazole 200 milliGRAM(s) Oral every 12 hours        famotidine    Tablet 20 milliGRAM(s) Oral daily    dextrose 40% Gel 15 Gram(s) Oral once PRN  dextrose 50% Injectable 12.5 Gram(s) IV Push once  dextrose 50% Injectable 25 Gram(s) IV Push once  dextrose 50% Injectable 25 Gram(s) IV Push once  glucagon  Injectable 1 milliGRAM(s) IntraMuscular once PRN  insulin lispro (HumaLOG) corrective regimen sliding scale   SubCutaneous three times a day before meals  insulin lispro (HumaLOG) corrective regimen sliding scale   SubCutaneous at bedtime  pravastatin 20 milliGRAM(s) Oral at bedtime  predniSONE   Tablet 3 milliGRAM(s) Oral <User Schedule>    dextrose 5%. 1000 milliLiter(s) IV Continuous <Continuous>  magnesium oxide 400 milliGRAM(s) Oral <User Schedule>  multivitamin 1 Tablet(s) Oral daily  sodium bicarbonate 650 milliGRAM(s) Oral <User Schedule>  sodium chloride 0.9% lock flush 3 milliLiter(s) IV Push every 8 hours  sodium chloride 0.9%. 1000 milliLiter(s) IV Continuous <Continuous>  tacrolimus 1 milliGRAM(s) Oral <User Schedule>      PAST MEDICAL & SURGICAL HISTORY:  HLD (hyperlipidemia)  Former smoker  DVT of upper extremity (deep vein thrombosis)  Hepatitis C virus  GIB (gastrointestinal bleeding)  Ventricular fibrillation: s/p AICD  PAF (paroxysmal atrial fibrillation): on xarelto  Non-Ischemic Cardiomyopathy  SVT (Supraventricular Tachycardia)  HTN  CHF (Congestive Heart Failure)  S/P right heart catheterization: biopsy multiple  H/O heart transplant: 2/2018  Status post left hip replacement  History of Prior Ablation Treatment: for afib  AICD (Automatic Cardioverter/Defibrillator) Present: [employer] with 1 [employer] lead4/1/09- explanted and replaced with Medtronic 2 leads on 9/2/09      FAMILY HISTORY:  No pertinent family history in first degree relatives      SOCIAL HISTORY:    [ ] Non-smoker  [ ] Smoker  [ ] Alcohol      REVIEW OF SYSTEMS:  General: no fatigue/malaise, weight loss/gain.  Skin: no rashes.  Ophthalmologic: no blurred vision, no loss of vision. 	  ENT: no sore throat, rhinorrhea, sinus congestion.  Respiratory: no SOB, cough or wheeze.  Gastrointestinal:  no N/V/D, no melena/hematemesis/hematochezia.  Genitourinary: no dysuria/hesitancy or hematuria.  Musculoskeletal: no myalgias or arthralgias.  Neurological: no changes in vision or hearing, no lightheadedness/dizziness, no syncope/near syncope	  Psychiatric: no unusual stress/anxiety.   Hematology/Lymphatics: no unusual bleeding, bruising and no lymphadenopathy.  Endocrine: no unusual thirst.   All others negative except as stated above and in HPI.    PHYSICAL EXAM:  T(C): 36.6 (09-05-18 @ 16:05), Max: 37.7 (09-05-18 @ 11:59)  HR: 118 (09-05-18 @ 16:05) (118 - 133)  BP: 115/80 (09-05-18 @ 16:05) (115/80 - 142/82)  RR: 18 (09-05-18 @ 16:05) (17 - 18)  SpO2: 95% (09-05-18 @ 16:05) (95% - 100%)  Wt(kg): --  I&O's Summary      Appearance: Normal	  HEENT:   Normal oral mucosa  Cardiovascular: normal rate, regular rhythm, audible S1 and S2, no murmurs, rubs, or gallops, no JVD, no edema  Respiratory: Lungs clear to auscultation	  Psychiatry: Mood & affect appropriate  Gastrointestinal:  Soft  Skin: No rashes, No ecchymoses, No cyanosis	  Neurologic: Non-focal  Extremities: No clubbing, cyanosis or edema  Vascular: Peripheral pulses palpable         LABS:	 	    CBC Full  -  ( 05 Sep 2018 12:10 )  WBC Count : 9.8 K/uL  Hemoglobin : 7.6 g/dL  Hematocrit : 26.9 %  Platelet Count - Automated : 340 K/uL  Mean Cell Volume : 90.4 fl  Mean Cell Hemoglobin : 25.5 pg  Mean Cell Hemoglobin Concentration : 28.2 gm/dL  Auto Neutrophil # : 4.4 K/uL  Auto Lymphocyte # : 0.8 K/uL  Auto Monocyte # : See Note  Auto Eosinophil # : 0.0 K/uL  Auto Basophil # : 0.0 K/uL  Auto Neutrophil % : 50.0 %  Auto Lymphocyte % : 11.0 %  Auto Monocyte % : 37.0 %  Auto Eosinophil % : 0.0 %  Auto Basophil % : 0.0 %          TELEMETRY: 	    ECG:  	  RADIOLOGY:  OTHER: 	    PREVIOUS DIAGNOSTIC TESTING:      Echocardiogram:    < from: Transthoracic Echocardiogram (08.14.18 @ 17:33) >  Conclusions:  Patient tachycardic throughout the study.  1. Moderate left atrial enlargement.  2. Left ventricular ejection fraction, by visual  estimation, is 45-50%.  Paradoxical septal motion.  3. Normal right atrium.  4. Right ventricle mildly dilated with mildly reduced  systolic function.  5. No significant valvular abnormalities.  6. Normal pericardium with no pericardial effusion.  Compared with echocardiogram of 8/1/2018, no significant  changes noted. CHIEF COMPLAINT:    HISTORY OF PRESENT ILLNESS: Mr. Baez is a 67 y/o man with h/o NICMP and chronic systolic heart failure s/p HM2 LVAD (6/2017) which was complicated by recurrent GIB and possible pump thrombosis who underwent OHT on 2/23/18  (CMV D-/R+, Toxo D-/R+, HCV+ donor). 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 He developed joshua evidence of graft dysfunction leading to treatment with plasmapheresis, IVIG and rituximab.    In June, he was admitted with L sided infiltrate, fevers, and chills. CT guided biopsy was uncremarkable. He was treated empirically with abx and discharged on voriconazole for presumed fungal PNA. HE was readmitted 8/14 - 8/29 with recurrent signs and symptoms of infection. Ct scan showed a new right upper lobe infiltrated. BCx were negative. CT guided bx grew only Corynebacterium. He was treated with IV cefepime x 14 days with improvement in symptoms. Also found to have evidence of C. Diff and was treated with oral vancomycin, which he was discharged home on.    He presented today for RHC and EMB found to have low grade fever of 99.9F and was noted to be tachycardic (ST ). He reports watery diarrhea since discharge 3-4x/day. He reports malaise and decreased appetite. He denies abdominal pain/cramping, fevers, chills, nausea, vomiting. Additionally, he notes intermittent productive cough with yellow sputum. He has not been very active at home, however reports LOBATO walking to the bathroom. Denies orthopnea, PND, CP, palpitations, lightheadedness, dizziness, LE edema.    Also, he notes swelling of right wrist x 4 days. Non-tender to palpation. Tenderness to L 4-5th digits. Denies trauma to wrist.     RHC done today showed: RA 4, RV 35/4, PA 35/14 (23), PCWP 5, CO/CI 7.9/4.3, Pa Sat 60%. EMB results pending.   Last TTE on 8/14 showed LVEF 45-50%, LVIDD of 4.5cm, and mild RV dilation.    Allergies    No Known Allergies      MEDICATIONS:  aspirin enteric coated 81 milliGRAM(s) Oral daily    atovaquone Suspension 1500 milliGRAM(s) Oral daily  metroNIDAZOLE  IVPB 500 milliGRAM(s) IV Intermittent every 8 hours  vancomycin    Solution 125 milliGRAM(s) Oral every 6 hours  voriconazole 200 milliGRAM(s) Oral every 12 hours    famotidine    Tablet 20 milliGRAM(s) Oral daily    dextrose 40% Gel 15 Gram(s) Oral once PRN  dextrose 50% Injectable 12.5 Gram(s) IV Push once  dextrose 50% Injectable 25 Gram(s) IV Push once  dextrose 50% Injectable 25 Gram(s) IV Push once  glucagon  Injectable 1 milliGRAM(s) IntraMuscular once PRN  insulin lispro (HumaLOG) corrective regimen sliding scale   SubCutaneous three times a day before meals  insulin lispro (HumaLOG) corrective regimen sliding scale   SubCutaneous at bedtime  pravastatin 20 milliGRAM(s) Oral at bedtime  predniSONE   Tablet 3 milliGRAM(s) Oral <User Schedule>    dextrose 5%. 1000 milliLiter(s) IV Continuous <Continuous>  magnesium oxide 400 milliGRAM(s) Oral <User Schedule>  multivitamin 1 Tablet(s) Oral daily  sodium bicarbonate 650 milliGRAM(s) Oral <User Schedule>  sodium chloride 0.9% lock flush 3 milliLiter(s) IV Push every 8 hours  sodium chloride 0.9%. 1000 milliLiter(s) IV Continuous <Continuous>  tacrolimus 1 milliGRAM(s) Oral <User Schedule>    PAST MEDICAL & SURGICAL HISTORY:  HLD (hyperlipidemia)  Former smoker  DVT of upper extremity (deep vein thrombosis)  Hepatitis C virus  GIB (gastrointestinal bleeding)  Ventricular fibrillation: s/p AICD  PAF (paroxysmal atrial fibrillation): on xarelto  Non-Ischemic Cardiomyopathy  SVT (Supraventricular Tachycardia)  HTN  CHF (Congestive Heart Failure)  S/P right heart catheterization: biopsy multiple  H/O heart transplant: 2/2018  Status post left hip replacement  History of Prior Ablation Treatment: for afib  AICD (Automatic Cardioverter/Defibrillator) Present: [employer] with 1 [employer] lead4/1/09- explanted and replaced with Medtronic 2 leads on 9/2/09    FAMILY HISTORY:  No pertinent family history in first degree relatives    SOCIAL HISTORY:    Denies tobacco, alcohol and illicit drug use.    REVIEW OF SYSTEMS:  12 point ROS done and found to be negative or noncontributory other than noted in HPI    PHYSICAL EXAM:  T(C): 36.6 (09-05-18 @ 16:05), Max: 37.7 (09-05-18 @ 11:59)  HR: 118 (09-05-18 @ 16:05) (118 - 133)  BP: 115/80 (09-05-18 @ 16:05) (115/80 - 142/82)  RR: 18 (09-05-18 @ 16:05) (17 - 18)  SpO2: 95% (09-05-18 @ 16:05) (95% - 100%)    Tele: -120s    Appearance: Toxic appearing.	  HEENT:  Lips pale.  Cardiovascular: Tachycardic, regular. S1S2, III/VI SM at LLSB, +2 radial pulses bilaterally. No LE edema.   Respiratory: Lungs clear to auscultation, productive cough with yellow sputum.  Psychiatry: Mood & affect appropriate  Gastrointestinal:  Soft, nontender, nondistended, + BS  Skin: No rashes, No ecchymoses, No cyanosis	  Neurologic: Lethargic, arousable to voice, oriented x 3, conversant.   Extremities: Swelling of write medial wrist. 3rd digit with area of stable eschar      LABS:                          7.6    9.8   )-----------( 340      ( 05 Sep 2018 12:10 )             26.9    	  CMP reviewed in [employer] from this morning. K 4.4, BUN/Cr38/1.5 from 31/1.27 on 8/31.    Tacrolimus level noted to be 29.5, however not trough taken after pt took meds    Last [place] level 8/29 8.4    Echocardiogram:    < from: Transthoracic Echocardiogram (08.14.18 @ 17:33) >  Conclusions:  Patient tachycardic throughout the study.  1. Moderate left atrial enlargement.  2. Left ventricular ejection fraction, by visual  estimation, is 45-50%.  Paradoxical septal motion.  3. Normal right atrium.  4. Right ventricle mildly dilated with mildly reduced  systolic function.  5. No significant valvular abnormalities.  6. Normal pericardium with no pericardial effusion.  Compared with echocardiogram of 8/1/2018, no significant  changes noted.

## 2018-09-05 NOTE — CONSULT NOTE ADULT - ASSESSMENT
67 yo M s/p heart transplant complicated by rejection with rounds of plasmaphoresis , IVIG, rituximab x2, known colonizer with  Pseudomonas last treated for PNA in June 2018  and again in August 14-30 with cefepime and voriconazole as well as c.diff with PO vanco now presents with decreased PO intake, 4 watery BM daily and a productive cough. Pt had CT guided R lung mass bx on 8/17 which should acute inflammation but was negative bacterial or fungal etiology. Concerned now for cdiff colitis vs CMV colitis.     last admission labs:   Fungitell negative  Galactomannan negative  Crypto Ag negative  Quant indeterminate, however lung biopsy w negative AFB stain  Pathology report from 8/17 R lung bx showing inflammation w negative AFB and GMS stain  CT guided biopsy cultures are NGTD- only grew a corynebacterium that is likely not a pathogen  Hep C neg (8/22)    Suggest:  continue PO Vancomycin 125mg q6hrs   Add Flagyl 500mg q8hrs  send stool for c,diff  send stool for GI PCR  Send CMV PCR  send uric acid   follow up on blood cultures   Obtain CT chest abdo and pelvis looking for pneumonia vs colitis   continue Mepron 1500mg daily  Continue Voriconazole 200mg q12hrs   if spikes fevers or becomes hemodynamically unstable start IV Cefepime and IV Vancomycin    Case discussed with Dr. Decker and Dr. Stahl 67 yo M s/p heart transplant complicated by rejection with rounds of plasmaphoresis , IVIG, rituximab x2, known colonizer with  Pseudomonas last treated for PNA in June 2018  and again in August 14-30 with cefepime and voriconazole as well as c.diff with PO vanco now presents with decreased PO intake, 4 watery BM daily and a productive cough. Pt had CT guided R lung mass bx on 8/17 which showed acute inflammation but was negative bacterial or fungal etiology. Concerned now for cdiff colitis vs CMV colitis.     last admission labs:   Fungitell negative  Galactomannan negative  Crypto Ag negative  Quant indeterminate, however lung biopsy w negative AFB stain  Pathology report from 8/17 R lung bx showing inflammation w negative AFB and GMS stain  CT guided biopsy cultures are NGTD- only grew a corynebacterium that is likely not a pathogen  Hep C neg (8/22)    Suggest:  continue PO Vancomycin 125mg q6hrs   Add Flagyl 500mg q8hrs  send stool for c,diff  send stool for GI PCR  Send CMV PCR- concerned that pt is at risk for CMV colitis  send uric acid - for hand pain  follow up on blood cultures   Obtain CT chest abdo and pelvis looking for pneumonia vs colitis ( oral contrast only)  continue Mepron 1500mg daily  Continue Voriconazole 200mg q12hrs - May want to hold Voriconazole with high Tacrolimus levels  Monitor Tacrolimus level  if spikes fevers or becomes hemodynamically unstable start IV Cefepime and IV Vancomycin    Case discussed with Dr. Decker and Dr. Stahl

## 2018-09-05 NOTE — PROGRESS NOTE ADULT - PROBLEM SELECTOR PLAN 2
Check PA-L CXR & CT Chest Abd/Pelvis with oral contrast to r/o colitis or other infectious process  Check blood cultures x2  Check GI stool PCR  ID following, continue on PO Vanco q6h and IV Flagyl Q8 for C.diff Check PA-L CXR & CT Chest Abd/Pelvis with oral contrast to r/o colitis or other infectious process  Check blood cultures x2  Check GI stool PCR  ID following, continue on PO Vanco q6h and IV Flagyl Q8 for C.diff  GI consulted for possible colonoscopy - start clear liquid diet tomorrow

## 2018-09-05 NOTE — PROGRESS NOTE ADULT - SUBJECTIVE AND OBJECTIVE BOX
Subjective: Pt states "I'm feeling alright" denies any CP, SOB, N/V and palpitations. No acute events overnight.     Telemetry:  -120  Vital Signs Last 24 Hrs  T(C): 36.6 (18 @ 16:05), Max: 37.7 (18 @ 11:59)  T(F): 97.9 (18 @ 16:05), Max: 99.9 (18 @ 11:59)  HR: 118 (18 @ 16:05) (118 - 133)  BP: 115/80 (18 @ 16:05) (115/80 - 142/82)  RR: 18 (18 @ 16:05) (17 - 18)  SpO2: 95% (18 @ 16:05) (95% - 100%)           Daily Weight in k.8 (05 Sep 2018 11:59)      PHYSICAL EXAM  Neurology: A&Ox3, nonfocal, no gross deficits  CV : RRR+S1S2  Lungs: Respirations non-labored, B/L BS CTA  Abdomen: Soft, NT/ND, +BSx4Q, last BM 9 (-)N/V/D  : Voiding without difficulty  Extremities: B/L LE no edema, negative calf tenderness, +PP +Right wrist swelling    MEDICATIONS  aspirin enteric coated 81 milliGRAM(s) Oral daily  atovaquone Suspension 1500 milliGRAM(s) Oral daily  Calcium Citrate + Vit D 315 mG/250 IU 2 Tablet(s) 2 Tablet(s) Oral <User Schedule>  famotidine    Tablet 20 milliGRAM(s) Oral daily  insulin lispro (HumaLOG) corrective regimen sliding scale   SubCutaneous three times a day before meals  insulin lispro (HumaLOG) corrective regimen sliding scale   SubCutaneous at bedtime  magnesium oxide 400 milliGRAM(s) Oral <User Schedule>  metroNIDAZOLE  IVPB 500 milliGRAM(s) IV Intermittent every 8 hours  multivitamin 1 Tablet(s) Oral daily  pravastatin 20 milliGRAM(s) Oral at bedtime  predniSONE   Tablet 3 milliGRAM(s) Oral <User Schedule>  sodium bicarbonate 650 milliGRAM(s) Oral <User Schedule>  sodium chloride 0.9% lock flush 3 milliLiter(s) IV Push every 8 hours  sodium chloride 0.9%. 1000 milliLiter(s) IV Continuous <Continuous>  tacrolimus 1 milliGRAM(s) Oral <User Schedule>  vancomycin    Solution 125 milliGRAM(s) Oral every 6 hours  voriconazole 200 milliGRAM(s) Oral every 12 hours

## 2018-09-05 NOTE — CONSULT NOTE ADULT - PROBLEM SELECTOR RECOMMENDATION 2
Heart transplant, orthotopic, status.  Plan: Immunosuppression prophylaxis:  Tacrolimus 0.5mg in AM/1mg in PM.  (goal 8-10). Will continue current regimen.  CellCept on hold due to leukopenia and infection. WBC 5.6 today.  Prednisone 3 mg po daily (since 8/19). Plan for further taper to 2 mg po daily on 9/10/18.  Next EMB scheduled for 9/5 (last on 8/1)  He will have labs including tacro level drawn Friday 8/31 in AM prior to AM tacro dosing    Antimicrobial prophylaxis:  Intermediate risk CMV - Valcyte held due to leukopenia. He is 6 months post-transplant so will monitor CMV PCR with monthly labs and not resume.  Intermediate risk Toxo - continue Mepron 1500 mg po daily  PCP prophy - covered by Mepron.  Hep C+ - he completed a course of Mayvret with undetectable Hep C virus now. Will need follow-up increased risk donor testing at 12 months post-OHT.    Other prophylaxis:  Pepcid 20 mg po daily  ECASA 81 mg po daily  Citracal + D 2 tabs po BID  Pravachol 20 mg po QHS. Immunosuppression prophylaxis:  Tacrolimus 1mg Q12.  (goal 8-10). Daily trough level prior to AM dose.  CellCept on hold due to infection.  Prednisone 3 mg po daily.  Repeat TTE ordered    Antimicrobial prophylaxis:  Intermediate risk CMV - Valcyte initially held due to leukopenia. He is 6 months post-transplant so will monitor CMV PCR. Please check CMV PCR sent today.  Intermediate risk Toxo - continue Mepron 1500 mg po daily  PCP prophy - covered by Mepron.  Hep C+ - he completed a course of Mayvret with undetectable Hep C virus now. Will need follow-up increased risk donor testing at 12 months post-OHT.    Other prophylaxis:  Pepcid 20 mg po daily  ECASA 81 mg po daily  Citracal + D 2 tabs po BID  Pravachol 20 mg po QHS.  Please DC PO mag as it can contribute to diarrhea. Check Mg with next labs and supplement with IV mag PRN for goal of 2-2.5 Immunosuppression  Tacrolimus 1mg Q12.  (goal 8-10).  CellCept on hold due to infection.  Prednisone 3 mg po daily.  Repeat TTE ordered    Antimicrobial prophylaxis:  Intermediate risk CMV - Valcyte initially held due to leukopenia. He is 6 months post-transplant so will monitor CMV PCR. Please check CMV PCR sent today.  Intermediate risk Toxo - continue Mepron 1500 mg po daily  PCP prophy - covered by Mepron.  Hep C+ - he completed a course of Mayvret with undetectable Hep C virus now. Will need follow-up increased risk donor testing at 12 months post-OHT.    Other prophylaxis:  Pepcid 20 mg po daily  ECASA 81 mg po daily  Citracal + D 2 tabs po BID  Pravachol 20 mg po QHS.  Please DC PO mag as it can contribute to diarrhea. Check Mg with next labs and supplement with IV mag PRN for goal of 2-2.5

## 2018-09-06 DIAGNOSIS — N17.9 ACUTE KIDNEY FAILURE, UNSPECIFIED: ICD-10-CM

## 2018-09-06 LAB
ANION GAP SERPL CALC-SCNC: 13 MMOL/L — SIGNIFICANT CHANGE UP (ref 5–17)
BASOPHILS # BLD AUTO: 0.01 K/UL
BASOPHILS NFR BLD AUTO: 0.1 %
BUN SERPL-MCNC: 35 MG/DL — HIGH (ref 7–23)
CALCIUM SERPL-MCNC: 8.8 MG/DL — SIGNIFICANT CHANGE UP (ref 8.4–10.5)
CHLORIDE SERPL-SCNC: 103 MMOL/L — SIGNIFICANT CHANGE UP (ref 96–108)
CMV DNA CSF QL NAA+PROBE: SIGNIFICANT CHANGE UP
CO2 SERPL-SCNC: 18 MMOL/L — LOW (ref 22–31)
CREAT SERPL-MCNC: 1.36 MG/DL — HIGH (ref 0.5–1.3)
EOSINOPHIL # BLD AUTO: 0.02 K/UL
EOSINOPHIL NFR BLD AUTO: 0.3 %
GLUCOSE BLDC GLUCOMTR-MCNC: 102 MG/DL — HIGH (ref 70–99)
GLUCOSE BLDC GLUCOMTR-MCNC: 109 MG/DL — HIGH (ref 70–99)
GLUCOSE BLDC GLUCOMTR-MCNC: 125 MG/DL — HIGH (ref 70–99)
GLUCOSE BLDC GLUCOMTR-MCNC: 89 MG/DL — SIGNIFICANT CHANGE UP (ref 70–99)
GLUCOSE SERPL-MCNC: 116 MG/DL — HIGH (ref 70–99)
HBA1C BLD-MCNC: 5.4 % — SIGNIFICANT CHANGE UP (ref 4–5.6)
HCT VFR BLD CALC: 24 % — LOW (ref 39–50)
HCT VFR BLD CALC: 25.9 %
HGB BLD-MCNC: 7.6 G/DL — LOW (ref 13–17)
HGB BLD-MCNC: 8.2 G/DL
IMM GRANULOCYTES NFR BLD AUTO: 1 %
LYMPHOCYTES # BLD AUTO: 2.25 K/UL
LYMPHOCYTES NFR BLD AUTO: 29.3 %
MAGNESIUM SERPL-MCNC: 1.9 MG/DL — SIGNIFICANT CHANGE UP (ref 1.6–2.6)
MAN DIFF?: NORMAL
MCHC RBC-ENTMCNC: 28.1 PG
MCHC RBC-ENTMCNC: 28.4 PG — SIGNIFICANT CHANGE UP (ref 27–34)
MCHC RBC-ENTMCNC: 31.5 GM/DL — LOW (ref 32–36)
MCHC RBC-ENTMCNC: 31.7 GM/DL
MCV RBC AUTO: 88.7 FL
MCV RBC AUTO: 90 FL — SIGNIFICANT CHANGE UP (ref 80–100)
MONOCYTES # BLD AUTO: 1.85 K/UL
MONOCYTES NFR BLD AUTO: 24.1 %
NEUTROPHILS # BLD AUTO: 3.46 K/UL
NEUTROPHILS NFR BLD AUTO: 45.2 %
PLATELET # BLD AUTO: 307 K/UL — SIGNIFICANT CHANGE UP (ref 150–400)
PLATELET # BLD AUTO: 314 K/UL
POTASSIUM SERPL-MCNC: 3.4 MMOL/L — LOW (ref 3.5–5.3)
POTASSIUM SERPL-MCNC: 4.4 MMOL/L — SIGNIFICANT CHANGE UP (ref 3.5–5.3)
POTASSIUM SERPL-SCNC: 3.4 MMOL/L — LOW (ref 3.5–5.3)
POTASSIUM SERPL-SCNC: 4.4 MMOL/L — SIGNIFICANT CHANGE UP (ref 3.5–5.3)
RBC # BLD: 2.66 M/UL — LOW (ref 4.2–5.8)
RBC # BLD: 2.92 M/UL
RBC # FLD: 21 % — HIGH (ref 10.3–14.5)
RBC # FLD: 22.4 %
SODIUM SERPL-SCNC: 134 MMOL/L — LOW (ref 135–145)
TACROLIMUS SERPL-MCNC: 17.7 NG/ML — SIGNIFICANT CHANGE UP
WBC # BLD: 6.3 K/UL — SIGNIFICANT CHANGE UP (ref 3.8–10.5)
WBC # FLD AUTO: 6.3 K/UL — SIGNIFICANT CHANGE UP (ref 3.8–10.5)
WBC # FLD AUTO: 7.67 K/UL

## 2018-09-06 PROCEDURE — 71045 X-RAY EXAM CHEST 1 VIEW: CPT | Mod: 26

## 2018-09-06 PROCEDURE — 99232 SBSQ HOSP IP/OBS MODERATE 35: CPT

## 2018-09-06 PROCEDURE — 99233 SBSQ HOSP IP/OBS HIGH 50: CPT

## 2018-09-06 PROCEDURE — 93010 ELECTROCARDIOGRAM REPORT: CPT

## 2018-09-06 PROCEDURE — 99222 1ST HOSP IP/OBS MODERATE 55: CPT | Mod: GC

## 2018-09-06 RX ORDER — TACROLIMUS 5 MG/1
1 CAPSULE ORAL
Qty: 0 | Refills: 0 | Status: DISCONTINUED | OUTPATIENT
Start: 2018-09-07 | End: 2018-09-07

## 2018-09-06 RX ORDER — POTASSIUM CHLORIDE 20 MEQ
20 PACKET (EA) ORAL
Qty: 0 | Refills: 0 | Status: COMPLETED | OUTPATIENT
Start: 2018-09-06 | End: 2018-09-06

## 2018-09-06 RX ORDER — ACETAMINOPHEN 500 MG
650 TABLET ORAL EVERY 6 HOURS
Qty: 0 | Refills: 0 | Status: DISCONTINUED | OUTPATIENT
Start: 2018-09-06 | End: 2018-09-08

## 2018-09-06 RX ORDER — POTASSIUM CHLORIDE 20 MEQ
20 PACKET (EA) ORAL ONCE
Qty: 0 | Refills: 0 | Status: COMPLETED | OUTPATIENT
Start: 2018-09-06 | End: 2018-09-06

## 2018-09-06 RX ORDER — ALBUMIN HUMAN 25 %
250 VIAL (ML) INTRAVENOUS ONCE
Qty: 0 | Refills: 0 | Status: COMPLETED | OUTPATIENT
Start: 2018-09-06 | End: 2018-09-06

## 2018-09-06 RX ORDER — TACROLIMUS 5 MG/1
0.5 CAPSULE ORAL
Qty: 0 | Refills: 0 | Status: DISCONTINUED | OUTPATIENT
Start: 2018-09-06 | End: 2018-09-07

## 2018-09-06 RX ORDER — SODIUM CHLORIDE 9 MG/ML
1000 INJECTION, SOLUTION INTRAVENOUS
Qty: 0 | Refills: 0 | Status: DISCONTINUED | OUTPATIENT
Start: 2018-09-06 | End: 2018-09-12

## 2018-09-06 RX ADMIN — Medication 125 MILLIGRAM(S): at 17:53

## 2018-09-06 RX ADMIN — VORICONAZOLE 200 MILLIGRAM(S): 10 INJECTION, POWDER, LYOPHILIZED, FOR SOLUTION INTRAVENOUS at 17:53

## 2018-09-06 RX ADMIN — ATOVAQUONE 1500 MILLIGRAM(S): 750 SUSPENSION ORAL at 13:02

## 2018-09-06 RX ADMIN — Medication 20 MILLIEQUIVALENT(S): at 14:26

## 2018-09-06 RX ADMIN — FAMOTIDINE 20 MILLIGRAM(S): 10 INJECTION INTRAVENOUS at 13:00

## 2018-09-06 RX ADMIN — SODIUM CHLORIDE 100 MILLILITER(S): 9 INJECTION INTRAMUSCULAR; INTRAVENOUS; SUBCUTANEOUS at 07:05

## 2018-09-06 RX ADMIN — Medication 81 MILLIGRAM(S): at 13:00

## 2018-09-06 RX ADMIN — SODIUM CHLORIDE 3 MILLILITER(S): 9 INJECTION INTRAMUSCULAR; INTRAVENOUS; SUBCUTANEOUS at 13:09

## 2018-09-06 RX ADMIN — Medication 125 MILLIGRAM(S): at 13:01

## 2018-09-06 RX ADMIN — Medication 20 MILLIEQUIVALENT(S): at 13:00

## 2018-09-06 RX ADMIN — Medication 20 MILLIEQUIVALENT(S): at 15:48

## 2018-09-06 RX ADMIN — Medication 650 MILLIGRAM(S): at 08:27

## 2018-09-06 RX ADMIN — Medication 125 MILLIGRAM(S): at 06:23

## 2018-09-06 RX ADMIN — Medication 100 MILLIGRAM(S): at 22:15

## 2018-09-06 RX ADMIN — TACROLIMUS 0.5 MILLIGRAM(S): 5 CAPSULE ORAL at 20:31

## 2018-09-06 RX ADMIN — Medication 3 MILLIGRAM(S): at 08:27

## 2018-09-06 RX ADMIN — Medication 125 MILLILITER(S): at 13:10

## 2018-09-06 RX ADMIN — Medication 125 MILLILITER(S): at 10:24

## 2018-09-06 RX ADMIN — Medication 650 MILLIGRAM(S): at 03:38

## 2018-09-06 RX ADMIN — Medication 650 MILLIGRAM(S): at 20:31

## 2018-09-06 RX ADMIN — Medication 100 MILLIGRAM(S): at 06:23

## 2018-09-06 RX ADMIN — TACROLIMUS 1 MILLIGRAM(S): 5 CAPSULE ORAL at 08:27

## 2018-09-06 RX ADMIN — SODIUM CHLORIDE 100 MILLILITER(S): 9 INJECTION, SOLUTION INTRAVENOUS at 16:38

## 2018-09-06 RX ADMIN — VORICONAZOLE 200 MILLIGRAM(S): 10 INJECTION, POWDER, LYOPHILIZED, FOR SOLUTION INTRAVENOUS at 06:23

## 2018-09-06 RX ADMIN — Medication 100 MILLIGRAM(S): at 14:43

## 2018-09-06 RX ADMIN — SODIUM CHLORIDE 3 MILLILITER(S): 9 INJECTION INTRAMUSCULAR; INTRAVENOUS; SUBCUTANEOUS at 06:01

## 2018-09-06 RX ADMIN — SODIUM CHLORIDE 3 MILLILITER(S): 9 INJECTION INTRAMUSCULAR; INTRAVENOUS; SUBCUTANEOUS at 22:12

## 2018-09-06 RX ADMIN — Medication 20 MILLIGRAM(S): at 22:14

## 2018-09-06 RX ADMIN — Medication 1 TABLET(S): at 13:00

## 2018-09-06 NOTE — CONSULT NOTE ADULT - SUBJECTIVE AND OBJECTIVE BOX
Chief Complaint:  Patient is a 68y old  Male who presents with a chief complaint of Admitted from MD's office due to fever of 102 F  and increase Heart rate, Patient denies chest pain, palpitation. However patient complaining of Left lower quadrant and lower extremity pain. (05 Sep 2018 18:33)    HPI:  68 year old man with hx of PMH NICM HFrEF s/p HM2 LVAD 2017, who underwent heart transplant on 18 (CMV D-/R+ and Toxo D-/R+, Hep C+ heart) with post op graft dysfunction who underwent plasmapheresis and IVIG x2 as well as rituximab, with suspected fungal PNA diagnosed 2018 treated w voriconazole, recurrent PNA with new RUL infiltrate on CT scan , which  improved on broad spectrum antibiotics.    GI consulted for diarrhea with concern for colitis. Patient reports for the last two weeks he has been having watery, brown stools - roughly 4 times per day associated with decreased appetite. He denies abdominal pain, nausea, vomiting, hematemesis, melena, hematochezia, recent travel and unusual foods. Currently reports his symptoms are improving with         Allergies:  No Known Allergies      Home Medications:    Hospital Medications:  acetaminophen   Tablet .. 650 milliGRAM(s) Oral every 6 hours PRN  albumin human  5% IVPB 250 milliLiter(s) IV Intermittent once  albumin human  5% IVPB 250 milliLiter(s) IV Intermittent once  aspirin enteric coated 81 milliGRAM(s) Oral daily  atovaquone Suspension 1500 milliGRAM(s) Oral daily  Calcium Citrate + Vit D 315 mG/250 IU 2 Tablet(s) 2 Tablet(s) Oral <User Schedule>  dextrose 40% Gel 15 Gram(s) Oral once PRN  dextrose 5%. 1000 milliLiter(s) IV Continuous <Continuous>  dextrose 50% Injectable 12.5 Gram(s) IV Push once  dextrose 50% Injectable 25 Gram(s) IV Push once  dextrose 50% Injectable 25 Gram(s) IV Push once  famotidine    Tablet 20 milliGRAM(s) Oral daily  glucagon  Injectable 1 milliGRAM(s) IntraMuscular once PRN  insulin lispro (HumaLOG) corrective regimen sliding scale   SubCutaneous three times a day before meals  insulin lispro (HumaLOG) corrective regimen sliding scale   SubCutaneous at bedtime  metroNIDAZOLE  IVPB 500 milliGRAM(s) IV Intermittent every 8 hours  multivitamin 1 Tablet(s) Oral daily  pravastatin 20 milliGRAM(s) Oral at bedtime  predniSONE   Tablet 3 milliGRAM(s) Oral <User Schedule>  sodium bicarbonate 650 milliGRAM(s) Oral <User Schedule>  sodium chloride 0.9% lock flush 3 milliLiter(s) IV Push every 8 hours  sodium chloride 0.9%. 1000 milliLiter(s) IV Continuous <Continuous>  tacrolimus 1 milliGRAM(s) Oral <User Schedule>  vancomycin    Solution 125 milliGRAM(s) Oral every 6 hours  voriconazole 200 milliGRAM(s) Oral every 12 hours      PMHX/PSHX:  HLD (hyperlipidemia)  Former smoker  DVT of upper extremity (deep vein thrombosis)  Hepatitis C virus  GIB (gastrointestinal bleeding)  H/O prior ablation treatment  Ventricular fibrillation  PAF (paroxysmal atrial fibrillation)  Non-Ischemic Cardiomyopathy  SVT (Supraventricular Tachycardia)  HTN  CHF (Congestive Heart Failure)  S/P right heart catheterization  H/O heart transplant  LVAD (left ventricular assist device) present  Status post left hip replacement  Hypertension  Hypertension  History of Prior Ablation Treatment  AICD (Automatic Cardioverter/Defibrillator) Present      Family history:  No pertinent family history in first degree relatives      Social History:     ROS:   General:  No fevers, chills or night sweats.  Eyes:  No blurry vision or eye pain  ENT:  No sore throat or dysphagia  CV:  No pain or palpitations  Resp:  No dyspnea, cough, wheezing  GI:  No pain, No nausea, No vomiting, No diarrhea, No constipation, No weight loss, No pruritis, No rectal bleeding, No tarry stools  Neuro:  No weakness or tingling  Psych:  No fatigue, insomnia, mood problems, depression  Heme:  No petechiae or bruising  Skin:  No rash or edema      PHYSICAL EXAM:   GENERAL:  NAD, Appears stated age  HEENT:  NC/AT,  conjunctivae clear and pink, sclera -anicteric  CHEST:  CTA B/L, Normal effort  HEART:  RRR S1/S2, No murmurs  ABDOMEN:  Soft, non-tender, non-distended, normoactive bowel sounds,  no masses ,no hepato-splenomegaly, no signs of chronic liver disease  EXTEREMITIES:  No cyanosis or Edema  SKIN:  Warm & Dry. No rash or erythema  NEURO:  Alert, oriented    Vital Signs:  Vital Signs Last 24 Hrs  T(C): 36.9 (06 Sep 2018 08:31), Max: 37.9 (06 Sep 2018 03:10)  T(F): 98.4 (06 Sep 2018 08:31), Max: 100.3 (06 Sep 2018 03:10)  HR: 124 (06 Sep 2018 08:31) (118 - 140)  BP: 111/69 (06 Sep 2018 08:31) (111/69 - 142/82)  BP(mean): 102 (05 Sep 2018 11:59) (102 - 102)  RR: 18 (06 Sep 2018 08:31) (17 - 18)  SpO2: 95% (06 Sep 2018 08:31) (95% - 100%)  Daily Height in cm: 172.72 (05 Sep 2018 11:59)    Daily Weight in k.6 (06 Sep 2018 08:31)    LABS:                        7.6    6.3   )-----------( 307      ( 06 Sep 2018 07:36 )             24.0     Mean Cell Volume: 90.0 fl (18 @ 07:36)        134<L>  |  103  |  35<H>  ----------------------------<  116<H>  3.4<L>   |  18<L>  |  1.36<H>    Ca    8.8      06 Sep 2018 07:35  Mg     1.9         TPro  6.6  /  Alb  3.5  /  TBili  0.9  /  DBili  x   /  AST  29  /  ALT  9<L>  /  AlkPhos  82  09-05    LIVER FUNCTIONS - ( 05 Sep 2018 17:45 )  Alb: 3.5 g/dL / Pro: 6.6 g/dL / ALK PHOS: 82 U/L / ALT: 9 U/L / AST: 29 U/L / GGT: x           PT/INR - ( 05 Sep 2018 17:45 )   PT: 16.4 sec;   INR: 1.51 ratio         PTT - ( 05 Sep 2018 17:45 )  PTT:31.9 sec                            7.6    6.3   )-----------( 307      ( 06 Sep 2018 07:36 )             24.0                         7.6    9.8   )-----------( 340      ( 05 Sep 2018 12:10 )             26.9     Imaging: Chief Complaint:  Patient is a 68y old  Male who presents with a chief complaint of Admitted from MD's office due to fever of 102 F  and increase Heart rate, Patient denies chest pain, palpitation. However patient complaining of Left lower quadrant and lower extremity pain. (05 Sep 2018 18:33)    HPI:  68 year old man with hx of PMH NICM HFrEF s/p HM2 LVAD 2017, who underwent heart transplant on 18 (CMV D-/R+ and Toxo D-/R+, Hep C+ heart) with post op graft dysfunction who underwent plasmapheresis and IVIG x2 as well as rituximab, with suspected fungal PNA diagnosed 2018 treated w voriconazole, recurrent PNA with new RUL infiltrate on CT scan , which  improved on broad spectrum antibiotics.    GI consulted for diarrhea with concern for colitis. Patient reports for the last two weeks he has been having watery, brown stools - roughly 4 times per day associated with decreased appetite. He denies abdominal pain, nausea, vomiting, hematemesis, melena, hematochezia, recent travel and unusual foods. Currently reports his symptoms are improving with two bowel movements yesterday that were more formed. Of note patient was recently diagnosed with C. Diff and has been treated with PO vancomycin    Allergies:  No Known Allergies    Home Medications:    Hospital Medications:  acetaminophen   Tablet .. 650 milliGRAM(s) Oral every 6 hours PRN  albumin human  5% IVPB 250 milliLiter(s) IV Intermittent once  albumin human  5% IVPB 250 milliLiter(s) IV Intermittent once  aspirin enteric coated 81 milliGRAM(s) Oral daily  atovaquone Suspension 1500 milliGRAM(s) Oral daily  Calcium Citrate + Vit D 315 mG/250 IU 2 Tablet(s) 2 Tablet(s) Oral <User Schedule>  dextrose 40% Gel 15 Gram(s) Oral once PRN  dextrose 5%. 1000 milliLiter(s) IV Continuous <Continuous>  dextrose 50% Injectable 12.5 Gram(s) IV Push once  dextrose 50% Injectable 25 Gram(s) IV Push once  dextrose 50% Injectable 25 Gram(s) IV Push once  famotidine    Tablet 20 milliGRAM(s) Oral daily  glucagon  Injectable 1 milliGRAM(s) IntraMuscular once PRN  insulin lispro (HumaLOG) corrective regimen sliding scale   SubCutaneous three times a day before meals  insulin lispro (HumaLOG) corrective regimen sliding scale   SubCutaneous at bedtime  metroNIDAZOLE  IVPB 500 milliGRAM(s) IV Intermittent every 8 hours  multivitamin 1 Tablet(s) Oral daily  pravastatin 20 milliGRAM(s) Oral at bedtime  predniSONE   Tablet 3 milliGRAM(s) Oral <User Schedule>  sodium bicarbonate 650 milliGRAM(s) Oral <User Schedule>  sodium chloride 0.9% lock flush 3 milliLiter(s) IV Push every 8 hours  sodium chloride 0.9%. 1000 milliLiter(s) IV Continuous <Continuous>  tacrolimus 1 milliGRAM(s) Oral <User Schedule>  vancomycin    Solution 125 milliGRAM(s) Oral every 6 hours  voriconazole 200 milliGRAM(s) Oral every 12 hours      PMHX/PSHX:  HLD (hyperlipidemia)  Former smoker  DVT of upper extremity (deep vein thrombosis)  Hepatitis C virus  GIB (gastrointestinal bleeding)  H/O prior ablation treatment  Ventricular fibrillation  PAF (paroxysmal atrial fibrillation)  Non-Ischemic Cardiomyopathy  SVT (Supraventricular Tachycardia)  HTN  CHF (Congestive Heart Failure)  S/P right heart catheterization  H/O heart transplant  LVAD (left ventricular assist device) present  Status post left hip replacement  Hypertension  Hypertension  History of Prior Ablation Treatment  AICD (Automatic Cardioverter/Defibrillator) Present      Family history:  No pertinent family history in first degree relatives      Social History:     ROS:   General:  No fevers, chills or night sweats.  Eyes:  No blurry vision or eye pain  ENT:  No sore throat or dysphagia  CV:  No pain or palpitations  Resp:  No dyspnea, cough, wheezing  GI:  No pain, No nausea, No vomiting, No diarrhea, No constipation, No weight loss, No pruritis, No rectal bleeding, No tarry stools  Neuro:  No weakness or tingling  Psych:  No fatigue, insomnia, mood problems, depression  Heme:  No petechiae or bruising  Skin:  No rash or edema      PHYSICAL EXAM:   GENERAL:  NAD, Appears stated age  HEENT:  NC/AT,  conjunctivae clear and pink, sclera -anicteric  CHEST:  CTA B/L, Normal effort  HEART:  RRR S1/S2, No murmurs  ABDOMEN:  Soft, non-tender, non-distended, normoactive bowel sounds,  no masses ,no hepato-splenomegaly, no signs of chronic liver disease  EXTEREMITIES:  No cyanosis or Edema  SKIN:  Warm & Dry. No rash or erythema  NEURO:  Alert, oriented    Vital Signs:  Vital Signs Last 24 Hrs  T(C): 36.9 (06 Sep 2018 08:31), Max: 37.9 (06 Sep 2018 03:10)  T(F): 98.4 (06 Sep 2018 08:31), Max: 100.3 (06 Sep 2018 03:10)  HR: 124 (06 Sep 2018 08:31) (118 - 140)  BP: 111/69 (06 Sep 2018 08:31) (111/69 - 142/82)  BP(mean): 102 (05 Sep 2018 11:59) (102 - 102)  RR: 18 (06 Sep 2018 08:31) (17 - 18)  SpO2: 95% (06 Sep 2018 08:31) (95% - 100%)  Daily Height in cm: 172.72 (05 Sep 2018 11:59)    Daily Weight in k.6 (06 Sep 2018 08:31)    LABS:                        7.6    6.3   )-----------( 307      ( 06 Sep 2018 07:36 )             24.0     Mean Cell Volume: 90.0 fl (18 @ 07:36)    09    134<L>  |  103  |  35<H>  ----------------------------<  116<H>  3.4<L>   |  18<L>  |  1.36<H>    Ca    8.8      06 Sep 2018 07:35  Mg     1.9         TPro  6.6  /  Alb  3.5  /  TBili  0.9  /  DBili  x   /  AST  29  /  ALT  9<L>  /  AlkPhos  82      LIVER FUNCTIONS - ( 05 Sep 2018 17:45 )  Alb: 3.5 g/dL / Pro: 6.6 g/dL / ALK PHOS: 82 U/L / ALT: 9 U/L / AST: 29 U/L / GGT: x           PT/INR - ( 05 Sep 2018 17:45 )   PT: 16.4 sec;   INR: 1.51 ratio         PTT - ( 05 Sep 2018 17:45 )  PTT:31.9 sec                            7.6    6.3   )-----------( 307      ( 06 Sep 2018 07:36 )             24.0                         7.6    9.8   )-----------( 340      ( 05 Sep 2018 12:10 )             26.9     Imaging: Chief Complaint:  Patient is a 68y old  Male who presents with a chief complaint of Admitted from MD's office due to fever of 102 F  and increase Heart rate, Patient denies chest pain, palpitation. However patient complaining of Left lower quadrant and lower extremity pain. (05 Sep 2018 18:33)    HPI:  68 year old man with hx of PMH NICM HFrEF s/p HM2 LVAD 2017, who underwent heart transplant on 18 (CMV D-/R+ and Toxo D-/R+, Hep C+ heart) with post op graft dysfunction who underwent plasmapheresis and IVIG x2 as well as rituximab, with suspected fungal PNA diagnosed 2018 treated w voriconazole, recurrent PNA with new RUL infiltrate on CT scan , which  improved on broad spectrum antibiotics.    GI consulted for diarrhea with concern for colitis. Patient reports for the last two weeks he has been having watery, brown stools - roughly 4 times per day associated with decreased appetite. He denies abdominal pain, nausea, vomiting, hematemesis, melena, hematochezia, recent travel and unusual foods. Currently reports his symptoms are improving with two bowel movements yesterday that were more formed. Of note patient was recently diagnosed with C. Diff and has been treated with PO vancomycin on     Allergies:  No Known Allergies    Home Medications:  Reviewed    Hospital Medications:  acetaminophen   Tablet .. 650 milliGRAM(s) Oral every 6 hours PRN  albumin human  5% IVPB 250 milliLiter(s) IV Intermittent once  albumin human  5% IVPB 250 milliLiter(s) IV Intermittent once  aspirin enteric coated 81 milliGRAM(s) Oral daily  atovaquone Suspension 1500 milliGRAM(s) Oral daily  Calcium Citrate + Vit D 315 mG/250 IU 2 Tablet(s) 2 Tablet(s) Oral <User Schedule>  dextrose 40% Gel 15 Gram(s) Oral once PRN  dextrose 5%. 1000 milliLiter(s) IV Continuous <Continuous>  dextrose 50% Injectable 12.5 Gram(s) IV Push once  dextrose 50% Injectable 25 Gram(s) IV Push once  dextrose 50% Injectable 25 Gram(s) IV Push once  famotidine    Tablet 20 milliGRAM(s) Oral daily  glucagon  Injectable 1 milliGRAM(s) IntraMuscular once PRN  insulin lispro (HumaLOG) corrective regimen sliding scale   SubCutaneous three times a day before meals  insulin lispro (HumaLOG) corrective regimen sliding scale   SubCutaneous at bedtime  metroNIDAZOLE  IVPB 500 milliGRAM(s) IV Intermittent every 8 hours  multivitamin 1 Tablet(s) Oral daily  pravastatin 20 milliGRAM(s) Oral at bedtime  predniSONE   Tablet 3 milliGRAM(s) Oral <User Schedule>  sodium bicarbonate 650 milliGRAM(s) Oral <User Schedule>  sodium chloride 0.9% lock flush 3 milliLiter(s) IV Push every 8 hours  sodium chloride 0.9%. 1000 milliLiter(s) IV Continuous <Continuous>  tacrolimus 1 milliGRAM(s) Oral <User Schedule>  vancomycin    Solution 125 milliGRAM(s) Oral every 6 hours  voriconazole 200 milliGRAM(s) Oral every 12 hours      PMHX/PSHX:  HLD (hyperlipidemia)  Former smoker  DVT of upper extremity (deep vein thrombosis)  Hepatitis C virus  GIB (gastrointestinal bleeding)  H/O prior ablation treatment  Ventricular fibrillation  PAF (paroxysmal atrial fibrillation)  Non-Ischemic Cardiomyopathy  SVT (Supraventricular Tachycardia)  HTN  CHF (Congestive Heart Failure)  S/P right heart catheterization  H/O heart transplant  LVAD (left ventricular assist device) present  Status post left hip replacement  Hypertension  Hypertension  History of Prior Ablation Treatment  AICD (Automatic Cardioverter/Defibrillator) Present    Family history:  No pertinent family history in first degree relatives      Social History:   No history of tobacco use, EtOH use or illicit drug use     ROS:   General:  + fevers, No chills or night sweats.  Eyes:  No blurry vision or eye pain  ENT:  No sore throat or dysphagia  CV:  No pain or palpitations  Resp:  + cough, No dyspnea, wheezing  GI:  + diarrhea, decreased appetite, No pain, No nausea, No vomiting, No constipation, No weight loss,rectal bleeding, No tarry stools  Skin:  No rash or edema    PHYSICAL EXAM:   GENERAL:  NAD, Appears stated age  HEENT:  NC/AT,  conjunctivae clear and pink, sclera -anicteric  CHEST:  CTA B/L, Normal effort  HEART:  Tachycardic, Regular rhythm, S1/S2, No murmurs  ABDOMEN:  Soft, non-tender, non-distended, normoactive bowel sounds,  no masses ,no hepatomegaly  EXTEREMITIES:  No cyanosis or Edema  SKIN:  Warm & Dry. No rash or erythema  NEURO:  Alert, oriented    Vital Signs:  Vital Signs Last 24 Hrs  T(C): 36.9 (06 Sep 2018 08:31), Max: 37.9 (06 Sep 2018 03:10)  T(F): 98.4 (06 Sep 2018 08:31), Max: 100.3 (06 Sep 2018 03:10)  HR: 124 (06 Sep 2018 08:31) (118 - 140)  BP: 111/69 (06 Sep 2018 08:31) (111/69 - 142/82)  BP(mean): 102 (05 Sep 2018 11:59) (102 - 102)  RR: 18 (06 Sep 2018 08:31) (17 - 18)  SpO2: 95% (06 Sep 2018 08:31) (95% - 100%)  Daily Height in cm: 172.72 (05 Sep 2018 11:59)    Daily Weight in k.6 (06 Sep 2018 08:31)    LABS:                        7.6    6.3   )-----------( 307      ( 06 Sep 2018 07:36 )             24.0     Mean Cell Volume: 90.0 fl (18 @ 07:36)        134<L>  |  103  |  35<H>  ----------------------------<  116<H>  3.4<L>   |  18<L>  |  1.36<H>    Ca    8.8      06 Sep 2018 07:35  Mg     1.9         TPro  6.6  /  Alb  3.5  /  TBili  0.9  /  DBili  x   /  AST  29  /  ALT  9<L>  /  AlkPhos  82      LIVER FUNCTIONS - ( 05 Sep 2018 17:45 )  Alb: 3.5 g/dL / Pro: 6.6 g/dL / ALK PHOS: 82 U/L / ALT: 9 U/L / AST: 29 U/L / GGT: x           PT/INR - ( 05 Sep 2018 17:45 )   PT: 16.4 sec;   INR: 1.51 ratio      PTT - ( 05 Sep 2018 17:45 )  PTT:31.9 sec

## 2018-09-06 NOTE — PROGRESS NOTE ADULT - SUBJECTIVE AND OBJECTIVE BOX
VITAL SIGNS    Telemetry:  st   st  120    Vital Signs Last 24 Hrs  T(C): 36.9 (18 @ 08:31), Max: 37.9 (18 @ 03:10)  T(F): 98.4 (18 @ 08:31), Max: 100.3 (18 @ 03:10)  HR: 124 (18 @ 08:31) (118 - 140)  BP: 111/69 (18 @ 08:31) (111/69 - 142/82)  RR: 18 (18 @ 08:31) (17 - 18)  SpO2: 95% (18 @ 08:31) (95% - 100%)                   Daily Height in cm: 172.72 (05 Sep 2018 11:59)    Daily Weight in k.6 (06 Sep 2018 08:31)      Bilirubin Total, Serum: 0.9 mg/dL ( @ 17:45)    CAPILLARY BLOOD GLUCOSE      POCT Blood Glucose.: 109 mg/dL (06 Sep 2018 07:43)  POCT Blood Glucose.: 75 mg/dL (05 Sep 2018 21:46)  POCT Blood Glucose.: 92 mg/dL (05 Sep 2018 20:05)                             PHYSICAL EXAM  s   I  have had loose stool at home   no chest pain or palpitations"  Neurology: alert and oriented x 3, moves all extremities with no defecits  CV :  RRR  Sternal Wound :  CDI , Stable  healed  Lungs:   CTA B/L  Abdomen: soft, nontender, nondistended, positive bowel sounds, last bowel movement 9/6   am  Extremities:      no edema   no calf  tenderness

## 2018-09-06 NOTE — CONSULT NOTE ADULT - ASSESSMENT
Impression:  # Diarrhea: Suspect C. Diff vs Immunosuppression-induced and bacterial infection. Less likely CMV colitis / IBD / dietary given improvement with treatment/changes during hospital stay. Per patient, diarrhea improved yesterday into today.    Recommendations:  - Follow up stool PCR  - Follow up CT read  - Continue PO vancomycin for C. Diff treatment  - Supportive care per primary team  - Check CMV PCR    Mitzi King MD  Gastroenterology Fellow  [phone]  Please page on call fellow on weekends and after 5pm on weekdays

## 2018-09-06 NOTE — PROGRESS NOTE ADULT - PROBLEM SELECTOR PLAN 1
- Follow up results of CMV, Bcx, c.diff and GI PCR  - ID following  - CT scan reviewed  - Recommend IVF LR at 100ml/hr x 1 L

## 2018-09-06 NOTE — PROGRESS NOTE ADULT - ASSESSMENT
67 yo M s/p heart transplant complicated by rejection with rounds of plasmaphoresis , IVIG, rituximab x2, known colonizer with  Pseudomonas last treated for PNA in June 2018  and again in August 14-30 with cefepime and voriconazole as well as c.diff with PO vanco now presents with decreased PO intake, 4 watery BM daily and a productive cough. Pt had CT guided R lung mass bx on 8/17 which showed acute inflammation but was negative bacterial or fungal etiology. Concerned now for cdiff colitis vs CMV colitis.     last admission labs:   Fungitell negative  Galactomannan negative  Crypto Ag negative  Quant indeterminate, however lung biopsy w negative AFB stain  Pathology report from 8/17 R lung bx showing inflammation w negative AFB and GMS stain  CT guided biopsy cultures are NGTD- only grew a corynebacterium that is likely not a pathogen  Hep C neg (8/22)    Suggest:  continue PO Vancomycin 125mg q6hrs   Add Flagyl 500mg q8hrs  send stool for c,diff  send stool for GI PCR  Send CMV PCR- concerned that pt is at risk for CMV colitis  send uric acid - for hand pain  follow up on blood cultures     continue Mepron 1500mg daily  Continue Voriconazole 200mg q12hrs - PLEASE GET VORICONAZOLE LEVELS  Monitor Tacrolimus level  if spikes fevers or becomes hemodynamically unstable start IV Cefepime   CT reviewed with radiology attending. Consider rebiopsy of lesion. The peripheral distribution makes me concerned for mold but there seemed to be some response to cefepime last time  check fungitell and galactomannan  check procalcitonin

## 2018-09-06 NOTE — PROGRESS NOTE ADULT - ATTENDING COMMENTS
Diarrhea less on po vanco and IV Flagyl.  CT scan shows increase in size of R lung infiltrate.  Will arrange for another IR-guided biopsy vs open lung biopsy.  Holding off on new abx coverage until after bx unless clinically deteriorates.

## 2018-09-06 NOTE — CONSULT NOTE ADULT - ATTENDING COMMENTS
Pt. with heart transplant 2/2018 (CMV D-/R+, HepC D+), postop graft dysfunction treated with plasmapheresis, IVIG x 2 and Rituximab, fungal PNA 6/2018 on voriconazole, recurrent PNA with RUL infiltrate 8/18/18 s/p lung biopsy 8/18, also recent C. diff colitis 8/18, still on PO vanco 125 mg bid PO, on Prednisone 3 mg/d, tacrolimus 1 mg bid, admitted on 9/5 when he went for RHC and biopsy and was found febrile 99.9, coughing, mild dyspnea, tachycardia 130s, R hand swelling with warmth and wrist and finger tenderness.  We are consulted for diarrhea, which, as per patient, never improved. He states that he has had two loose or watery bowel movements since 3-4 weeks ago when diagnosed with C. diff, but it never improved. At baseline, he has one formed BM every 1-2 days. Denies blood. He has also had poor appetite for a few weeks, but denies weight loss. At his new baseline, he is able to walk around the block of his house with a walker.    ID and transplant medicine are following, and he is now on PO vanco, and IV Flagyl. He has a new pneumonic infiltrate. Tacro level supratherapeutic. Stool PCRs for C diff and GI panel have been sent, serum PCR for CMV pending as well. He reported improved diarrhea to other physicians, but said to me that it is unchanged.  He had a colonoscopy prior to his heart transplant and thinks that it was ok. He was treated with Mavyret and is HCV RNA negative - pending re-evaluation to evaluate for sustained virologic response.    - f/u stool PCRs and serum PCR for CMV  - follow clinical course; if no actual improvement on current treatment, would do flexible sigmoidoscopy as he is at risk for CMV colitis and this may be present even if the virus is undetectable in the blood. His diarrhea may also be related to his medications.  - his main problem right now seems his lung infection with a new infiltrate and IR is planning FNA tomorrow

## 2018-09-06 NOTE — PROGRESS NOTE ADULT - PROBLEM SELECTOR PLAN 2
Decrease Tacrolimus to 1mg in AM/0.5mg in PM.  (goal 8-10).  CellCept on hold due to infection.  Prednisone 3 mg po daily.    Antimicrobial prophylaxis:  Intermediate risk CMV - Valcyte initially held due to leukopenia. He is 6 months post-transplant so will monitor CMV PCR. Will follow up CMV PCR sent yesterday.  Intermediate risk Toxo - continue Mepron 1500 mg po daily  PCP prophy - covered by Mepron.  Hep C+ - he completed a course of Mayvret with undetectable Hep C virus now. Will need follow-up increased risk donor testing at 12 months post-OHT.    Other prophylaxis:  Pepcid 20 mg po daily  ECASA 81 mg po daily  Citracal + D 2 tabs po BID  Pravachol 20 mg po QHS.  Please supplement with IV mag PRN for goal of 2-2.5.

## 2018-09-06 NOTE — PROGRESS NOTE ADULT - ASSESSMENT
69 y/o M with history of NICM now s/p heart transplantation on 2/23/18. 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 He has received therapy with IVIG, plamsapheresis, and rituximab. RHC today shows low normal filing pressures and high normal output. His presentation with low grade fevers, malaise, and persistent diarrhea concerning for recurrent infection, despite treatment for presumed fungal pneumonia and c.diff. Last CMV negative on 8/21. Enlargement of RUL opacity noted on chest CT compared with that from 8/27, LLL opacity stable. Mild rectal wall thickening noted, ongoing diarrhea. Increasingly tachycardic today despite 1L I.VF and 500mL albumin. Low grade fever. Normotensive. Tacro level supratherapeutic today.

## 2018-09-06 NOTE — PROGRESS NOTE ADULT - PROBLEM SELECTOR PLAN 2
blood cultures x2   pending  ID following, continue on PO Vanco q6h and IV Flagyl Q8 for C.diff  GI consulted for possible colonoscopy

## 2018-09-06 NOTE — PROGRESS NOTE ADULT - SUBJECTIVE AND OBJECTIVE BOX
Patient is a 68y old  Male who presents with a chief complaint of readmit  s/p HT (06 Sep 2018 09:37)    Being followed by ID for        Interval history:  No other acute events      ROS:  No cough,SOB,CP  No N/V/D  No abd pain  No urinary complaints  No HA  No joint or limb pain  No other complaints    PAST MEDICAL & SURGICAL HISTORY:  HLD (hyperlipidemia)  Former smoker  DVT of upper extremity (deep vein thrombosis)  Hepatitis C virus  GIB (gastrointestinal bleeding)  Ventricular fibrillation: s/p AICD  PAF (paroxysmal atrial fibrillation): on xarelto  Non-Ischemic Cardiomyopathy  SVT (Supraventricular Tachycardia)  HTN  CHF (Congestive Heart Failure)  S/P right heart catheterization: biopsy multiple  H/O heart transplant: 2/2018  Status post left hip replacement  History of Prior Ablation Treatment: for afib  AICD (Automatic Cardioverter/Defibrillator) Present: [employer] with 1 [employer] lead4/1/09- explanted and replaced with [employer] 2 leads on 9/2/09    Allergies    No Known Allergies    Intolerances      Antimicrobials:    atovaquone Suspension 1500 milliGRAM(s) Oral daily  metroNIDAZOLE  IVPB 500 milliGRAM(s) IV Intermittent every 8 hours  vancomycin    Solution 125 milliGRAM(s) Oral every 6 hours  voriconazole 200 milliGRAM(s) Oral every 12 hours    MEDICATIONS  (STANDING):  aspirin enteric coated 81 milliGRAM(s) Oral daily  atovaquone Suspension 1500 milliGRAM(s) Oral daily  Calcium Citrate + Vit D 315 mG/250 IU 2 Tablet(s) 2 Tablet(s) Oral <User Schedule>  dextrose 5%. 1000 milliLiter(s) (50 mL/Hr) IV Continuous <Continuous>  dextrose 50% Injectable 12.5 Gram(s) IV Push once  dextrose 50% Injectable 25 Gram(s) IV Push once  dextrose 50% Injectable 25 Gram(s) IV Push once  famotidine    Tablet 20 milliGRAM(s) Oral daily  insulin lispro (HumaLOG) corrective regimen sliding scale   SubCutaneous three times a day before meals  insulin lispro (HumaLOG) corrective regimen sliding scale   SubCutaneous at bedtime  lactated ringers. 1000 milliLiter(s) (100 mL/Hr) IV Continuous <Continuous>  metroNIDAZOLE  IVPB 500 milliGRAM(s) IV Intermittent every 8 hours  multivitamin 1 Tablet(s) Oral daily  potassium chloride    Tablet ER 20 milliEquivalent(s) Oral every 2 hours  pravastatin 20 milliGRAM(s) Oral at bedtime  predniSONE   Tablet 3 milliGRAM(s) Oral <User Schedule>  sodium bicarbonate 650 milliGRAM(s) Oral <User Schedule>  sodium chloride 0.9% lock flush 3 milliLiter(s) IV Push every 8 hours  sodium chloride 0.9%. 1000 milliLiter(s) (100 mL/Hr) IV Continuous <Continuous>  tacrolimus 0.5 milliGRAM(s) Oral <User Schedule>  vancomycin    Solution 125 milliGRAM(s) Oral every 6 hours  voriconazole 200 milliGRAM(s) Oral every 12 hours      Vital Signs Last 24 Hrs  T(C): 36.8 (09-06-18 @ 12:56), Max: 37.9 (09-06-18 @ 03:10)  T(F): 98.2 (09-06-18 @ 12:56), Max: 100.3 (09-06-18 @ 03:10)  HR: 125 (09-06-18 @ 12:56) (118 - 140)  BP: 121/80 (09-06-18 @ 12:56) (111/69 - 129/79)  BP(mean): --  RR: 18 (09-06-18 @ 12:56) (18 - 18)  SpO2: 95% (09-06-18 @ 10:20) (95% - 96%)    Physical Exam:    Constitutional well preserved,comfortable,pleasant    HEENT PERRLA EOMI,No pallor or icterus    No oral exudate or erythema    Neck supple no JVD or LN    Chest Good AE,CTA        Abd soft BS normal No tenderness no masses    Ext No cyanosis clubbing or edema    IV site no erythema tenderness or discharge    Joints no swelling or LOM    CNS AAO X 3 no focal    Lab Data:                          7.6    6.3   )-----------( 307      ( 06 Sep 2018 07:36 )             24.0       09-06    134<L>  |  103  |  35<H>  ----------------------------<  116<H>  3.4<L>   |  18<L>  |  1.36<H>    Ca    8.8      06 Sep 2018 07:35  Mg     1.9     09-06    TPro  6.6  /  Alb  3.5  /  TBili  0.9  /  DBili  x   /  AST  29  /  ALT  9<L>  /  AlkPhos  82  09-05          WBC Count: 6.3 (09-06-18 @ 07:36)  WBC Count: 9.8 (09-05-18 @ 12:10) Patient is a 68y old  Male who presents with a chief complaint of readmit  s/p HT (06 Sep 2018 09:37)    Being followed by ID for help with managment        Interval history:  s/p CT  pt notes diarrhea is less  breathing around the same, occasional clear phlegm  No N/V/D  No abd pain  No urinary complaints  No HA  No joint or limb pain  No other complaints    PAST MEDICAL & SURGICAL HISTORY:  HLD (hyperlipidemia)  Former smoker  DVT of upper extremity (deep vein thrombosis)  Hepatitis C virus  GIB (gastrointestinal bleeding)  Ventricular fibrillation: s/p AICD  PAF (paroxysmal atrial fibrillation): on xarelto  Non-Ischemic Cardiomyopathy  SVT (Supraventricular Tachycardia)  HTN  CHF (Congestive Heart Failure)  S/P right heart catheterization: biopsy multiple  H/O heart transplant: 2/2018  Status post left hip replacement  History of Prior Ablation Treatment: for afib  AICD (Automatic Cardioverter/Defibrillator) Present: [employer] with 1 [employer] lead4/1/09- explanted and replaced with [employer] 2 leads on 9/2/09    Allergies    No Known Allergies    Intolerances      Antimicrobials:    atovaquone Suspension 1500 milliGRAM(s) Oral daily  metroNIDAZOLE  IVPB 500 milliGRAM(s) IV Intermittent every 8 hours  vancomycin    Solution 125 milliGRAM(s) Oral every 6 hours  voriconazole 200 milliGRAM(s) Oral every 12 hours    MEDICATIONS  (STANDING):  aspirin enteric coated 81 milliGRAM(s) Oral daily  atovaquone Suspension 1500 milliGRAM(s) Oral daily  Calcium Citrate + Vit D 315 mG/250 IU 2 Tablet(s) 2 Tablet(s) Oral <User Schedule>  dextrose 5%. 1000 milliLiter(s) (50 mL/Hr) IV Continuous <Continuous>  dextrose 50% Injectable 12.5 Gram(s) IV Push once  dextrose 50% Injectable 25 Gram(s) IV Push once  dextrose 50% Injectable 25 Gram(s) IV Push once  famotidine    Tablet 20 milliGRAM(s) Oral daily  insulin lispro (HumaLOG) corrective regimen sliding scale   SubCutaneous three times a day before meals  insulin lispro (HumaLOG) corrective regimen sliding scale   SubCutaneous at bedtime  lactated ringers. 1000 milliLiter(s) (100 mL/Hr) IV Continuous <Continuous>  metroNIDAZOLE  IVPB 500 milliGRAM(s) IV Intermittent every 8 hours  multivitamin 1 Tablet(s) Oral daily  potassium chloride    Tablet ER 20 milliEquivalent(s) Oral every 2 hours  pravastatin 20 milliGRAM(s) Oral at bedtime  predniSONE   Tablet 3 milliGRAM(s) Oral <User Schedule>  sodium bicarbonate 650 milliGRAM(s) Oral <User Schedule>  sodium chloride 0.9% lock flush 3 milliLiter(s) IV Push every 8 hours  sodium chloride 0.9%. 1000 milliLiter(s) (100 mL/Hr) IV Continuous <Continuous>  tacrolimus 0.5 milliGRAM(s) Oral <User Schedule>  vancomycin    Solution 125 milliGRAM(s) Oral every 6 hours  voriconazole 200 milliGRAM(s) Oral every 12 hours      Vital Signs Last 24 Hrs  T(C): 36.8 (09-06-18 @ 12:56), Max: 37.9 (09-06-18 @ 03:10)  T(F): 98.2 (09-06-18 @ 12:56), Max: 100.3 (09-06-18 @ 03:10)  HR: 125 (09-06-18 @ 12:56) (118 - 140)  BP: 121/80 (09-06-18 @ 12:56) (111/69 - 129/79)  BP(mean): --  RR: 18 (09-06-18 @ 12:56) (18 - 18)  SpO2: 95% (09-06-18 @ 10:20) (95% - 96%)    Physical Exam:    Constitutional well preserved,comfortable,pleasant    HEENT PERRLA EOMI,No pallor or icterus    No oral exudate or erythema    Neck supple no JVD or LN    Chest Good AE,CTA    Cor: tachycardic    Abd soft BS normal No tenderness no masses    Ext No cyanosis clubbing     IV site no erythema tenderness or discharge    Joints no swelling or LOM    CNS AAO X 3 no focal    Lab Data:                          7.6    6.3   )-----------( 307      ( 06 Sep 2018 07:36 )             24.0       09-06    134<L>  |  103  |  35<H>  ----------------------------<  116<H>  3.4<L>   |  18<L>  |  1.36<H>    Ca    8.8      06 Sep 2018 07:35  Mg     1.9     09-06    TPro  6.6  /  Alb  3.5  /  TBili  0.9  /  DBili  x   /  AST  29  /  ALT  9<L>  /  AlkPhos  82  09-05        WBC Count: 6.3 (09-06-18 @ 07:36)  WBC Count: 9.8 (09-05-18 @ 12:10)        < from: CT Chest No Cont (09.05.18 @ 22:35) >  EXAM:  CT ABDOMEN AND PELVIS OC                          EXAM:  CT CHEST                            PROCEDURE DATE:  09/05/2018            INTERPRETATION:  CLINICAL INFORMATION: Status post heart transplant.   Assess for infectious process or colitis.    COMPARISON: CT chest and abdomen 08/14/2018 and CT chest 08/27/2018. CT   abdomen and pelvis 06/03/2018 and 04/04/2017.    PROCEDURE:   CT of the Chest, Abdomen and Pelvis was performed without intravenous   contrast.   Intravenous contrast: None.  Oral contrast: positive contrast was administered.  Sagittal and coronal reformats were performed.    FINDINGS:    CHEST:     LUNGS AND LARGE AIRWAYS: Small debris within the trachea. No   endobronchial lesions. Emphysema. Interval increase in size of the right   lobe nodular opacity now measuring 5.2 x 3.7 cm (series 3, image 60),   previously 4.1 x 2.7 cm when remeasured on prior study. However, this is   overall decreased in size from 08/14/2018. Redemonstration of thick   linear opacitiesin the right middle and lower lobes, likely   atelectasis/scarring. A nodular opacity in the left lower lobe measures   2.2 cm (series 3, image 113), unchanged given differences in technique   since prior.  Scattered pulmonary nodules as follows:  A 0.3 cm in the apex (series 3, image 33), new.  A 0.3 cm subpleural in the anterior right upper lobe (series 3, image   47), previously 0.2 cm.  A 1.1 cm left lower lobe (series 3, image 123), previously 0.7 cm.    PLEURA: Trace bilateral pleural effusions.  VESSELS: Thoracic aorta and main pulmonary artery are normal in caliber.   Calcified filling defects in the left subclavian, left brachiocephalic   vein and superior vena cava.  HEART: Heart size is normal. No pericardial effusion.  MEDIASTINUMAND DARIEN: No lymphadenopathy.  CHEST WALL AND LOWER NECK: Status post sternotomy.    ABDOMEN AND PELVIS:    Evaluation of the solid visceral organs is limited without the   administration of intravenous contrast    LIVER: Within normal limits.  BILE DUCTS: Normal caliber.  GALLBLADDER: Cholelithiasis.  SPLEEN: Within normal limits.  PANCREAS: Within normal limits.  ADRENALS: Nodular adrenal glands bilaterally with an indeterminate 1.9 cm   left adrenal nodule, unchanged given differences in technique since 2017.  KIDNEYS/URETERS: Left lower pole renal cyst. No hydronephrosis.    BLADDER: Streak artifact from the left hip prosthesis limits evaluation   of the urinary bladder.  REPRODUCTIVE ORGANS: Normal size prostate.    BOWEL: Mild rectal wall thickening. No bowel obstruction. Appendix is   normal.  PERITONEUM: No ascites.  VESSELS:  Atherosclerotic change.  RETROPERITONEUM: No lymphadenopathy.    ABDOMINAL WALL: Within normal limits.  BONES: Left hip replacement. Multilevel degenerativechange with marked   endplate sclerotic change at L2-L3.    IMPRESSION:     Redemonstration of nodular opacities in the right upper and left lower   lobes, with the opacity in the right upper lobe increased in size since   08/27/2018, but overall decreased from 08/14/2018. The left lower lobe   opacity is stable since 08/27/2018. Additional bilateral pulmonary   nodules measuring up to 1.1 cm in the left lower lobe. Continued   follow-up is recommended.    Calcified thrombus in the left subclavian and left brachiocephalic veins   and the superior vena cava.    Mild rectal wall thickening, may be seen in setting of proctitis.    A 1.9 cm indeterminate left adrenal nodule. Dedicated adrenal protocol CT   or MR may be considered.    Cholelithiasis.                       GASTON UMANA M.D., ATTENDING RADIOLOGIST  This document has been electronically signed. Sep  6 2018 11:51AM        < end of copied text > Patient is a 68y old  Male who presents with a chief complaint of readmit  s/p HT (06 Sep 2018 09:37)    Being followed by ID for help with managment        Interval history:  s/p CT  pt notes diarrhea is less  breathing around the same, occasional clear phlegm  No N/V/D  No abd pain  No urinary complaints  No HA  No joint or limb pain  No other complaints    PAST MEDICAL & SURGICAL HISTORY:  HLD (hyperlipidemia)  Former smoker  DVT of upper extremity (deep vein thrombosis)  Hepatitis C virus  GIB (gastrointestinal bleeding)  Ventricular fibrillation: s/p AICD  PAF (paroxysmal atrial fibrillation): on xarelto  Non-Ischemic Cardiomyopathy  SVT (Supraventricular Tachycardia)  HTN  CHF (Congestive Heart Failure)  S/P right heart catheterization: biopsy multiple  H/O heart transplant: 2/2018  Status post left hip replacement  History of Prior Ablation Treatment: for afib  AICD (Automatic Cardioverter/Defibrillator) Present: [employer] with 1 [employer] lead4/1/09- explanted and replaced with [employer] 2 leads on 9/2/09    Allergies    No Known Allergies    Intolerances      Antimicrobials:    atovaquone Suspension 1500 milliGRAM(s) Oral daily  metroNIDAZOLE  IVPB 500 milliGRAM(s) IV Intermittent every 8 hours  vancomycin    Solution 125 milliGRAM(s) Oral every 6 hours  voriconazole 200 milliGRAM(s) Oral every 12 hours    MEDICATIONS  (STANDING):  aspirin enteric coated 81 milliGRAM(s) Oral daily  atovaquone Suspension 1500 milliGRAM(s) Oral daily  Calcium Citrate + Vit D 315 mG/250 IU 2 Tablet(s) 2 Tablet(s) Oral <User Schedule>  dextrose 5%. 1000 milliLiter(s) (50 mL/Hr) IV Continuous <Continuous>  dextrose 50% Injectable 12.5 Gram(s) IV Push once  dextrose 50% Injectable 25 Gram(s) IV Push once  dextrose 50% Injectable 25 Gram(s) IV Push once  famotidine    Tablet 20 milliGRAM(s) Oral daily  insulin lispro (HumaLOG) corrective regimen sliding scale   SubCutaneous three times a day before meals  insulin lispro (HumaLOG) corrective regimen sliding scale   SubCutaneous at bedtime  lactated ringers. 1000 milliLiter(s) (100 mL/Hr) IV Continuous <Continuous>  metroNIDAZOLE  IVPB 500 milliGRAM(s) IV Intermittent every 8 hours  multivitamin 1 Tablet(s) Oral daily  potassium chloride    Tablet ER 20 milliEquivalent(s) Oral every 2 hours  pravastatin 20 milliGRAM(s) Oral at bedtime  predniSONE   Tablet 3 milliGRAM(s) Oral <User Schedule>  sodium bicarbonate 650 milliGRAM(s) Oral <User Schedule>  sodium chloride 0.9% lock flush 3 milliLiter(s) IV Push every 8 hours  sodium chloride 0.9%. 1000 milliLiter(s) (100 mL/Hr) IV Continuous <Continuous>  tacrolimus 0.5 milliGRAM(s) Oral <User Schedule>  vancomycin    Solution 125 milliGRAM(s) Oral every 6 hours  voriconazole 200 milliGRAM(s) Oral every 12 hours      Vital Signs Last 24 Hrs  T(C): 36.8 (09-06-18 @ 12:56), Max: 37.9 (09-06-18 @ 03:10)  T(F): 98.2 (09-06-18 @ 12:56), Max: 100.3 (09-06-18 @ 03:10)  HR: 125 (09-06-18 @ 12:56) (118 - 140)  BP: 121/80 (09-06-18 @ 12:56) (111/69 - 129/79)  BP(mean): --  RR: 18 (09-06-18 @ 12:56) (18 - 18)  SpO2: 95% (09-06-18 @ 10:20) (95% - 96%)    Physical Exam:    Constitutional well preserved,comfortable,pleasant    HEENT PERRLA EOMI,No pallor or icterus    No oral exudate or erythema    Neck supple no JVD or LN    Chest Good AE,CTA    Cor: tachycardic    Abd soft BS normal No tenderness no masses    Ext No cyanosis clubbing     IV site no erythema tenderness or discharge    Joints no swelling or LOM    CNS AAO X 3 no focal    Lab Data:                          7.6    6.3   )-----------( 307      ( 06 Sep 2018 07:36 )             24.0       09-06    134<L>  |  103  |  35<H>  ----------------------------<  116<H>  3.4<L>   |  18<L>  |  1.36<H>    Ca    8.8      06 Sep 2018 07:35  Mg     1.9     09-06    TPro  6.6  /  Alb  3.5  /  TBili  0.9  /  DBili  x   /  AST  29  /  ALT  9<L>  /  AlkPhos  82  09-05        WBC Count: 6.3 (09-06-18 @ 07:36)  WBC Count: 9.8 (09-05-18 @ 12:10)        < from: CT Chest No Cont (09.05.18 @ 22:35) >  EXAM:  CT ABDOMEN AND PELVIS OC                          EXAM:  CT CHEST                            PROCEDURE DATE:  09/05/2018            INTERPRETATION:  CLINICAL INFORMATION: Status post heart transplant.   Assess for infectious process or colitis.    COMPARISON: CT chest and abdomen 08/14/2018 and CT chest 08/27/2018. CT   abdomen and pelvis 06/03/2018 and 04/04/2017.    PROCEDURE:   CT of the Chest, Abdomen and Pelvis was performed without intravenous   contrast.   Intravenous contrast: None.  Oral contrast: positive contrast was administered.  Sagittal and coronal reformats were performed.    FINDINGS:    CHEST:     LUNGS AND LARGE AIRWAYS: Small debris within the trachea. No   endobronchial lesions. Emphysema. Interval increase in size of the right   lobe nodular opacity now measuring 5.2 x 3.7 cm (series 3, image 60),   previously 4.1 x 2.7 cm when remeasured on prior study. However, this is   overall decreased in size from 08/14/2018. Redemonstration of thick   linear opacitiesin the right middle and lower lobes, likely   atelectasis/scarring. A nodular opacity in the left lower lobe measures   2.2 cm (series 3, image 113), unchanged given differences in technique   since prior.  Scattered pulmonary nodules as follows:  A 0.3 cm in the apex (series 3, image 33), new.  A 0.3 cm subpleural in the anterior right upper lobe (series 3, image   47), previously 0.2 cm.  A 1.1 cm left lower lobe (series 3, image 123), previously 0.7 cm.    PLEURA: Trace bilateral pleural effusions.  VESSELS: Thoracic aorta and main pulmonary artery are normal in caliber.   Calcified filling defects in the left subclavian, left brachiocephalic   vein and superior vena cava.  HEART: Heart size is normal. No pericardial effusion.  MEDIASTINUMAND DARIEN: No lymphadenopathy.  CHEST WALL AND LOWER NECK: Status post sternotomy.    ABDOMEN AND PELVIS:    Evaluation of the solid visceral organs is limited without the   administration of intravenous contrast    LIVER: Within normal limits.  BILE DUCTS: Normal caliber.  GALLBLADDER: Cholelithiasis.  SPLEEN: Within normal limits.  PANCREAS: Within normal limits.  ADRENALS: Nodular adrenal glands bilaterally with an indeterminate 1.9 cm   left adrenal nodule, unchanged given differences in technique since 2017.  KIDNEYS/URETERS: Left lower pole renal cyst. No hydronephrosis.    BLADDER: Streak artifact from the left hip prosthesis limits evaluation   of the urinary bladder.  REPRODUCTIVE ORGANS: Normal size prostate.    BOWEL: Mild rectal wall thickening. No bowel obstruction. Appendix is   normal.  PERITONEUM: No ascites.  VESSELS:  Atherosclerotic change.  RETROPERITONEUM: No lymphadenopathy.    ABDOMINAL WALL: Within normal limits.  BONES: Left hip replacement. Multilevel degenerativechange with marked   endplate sclerotic change at L2-L3.    IMPRESSION:     Redemonstration of nodular opacities in the right upper and left lower   lobes, with the opacity in the right upper lobe increased in size since   08/27/2018, but overall decreased from 08/14/2018. The left lower lobe   opacity is stable since 08/27/2018. Additional bilateral pulmonary   nodules measuring up to 1.1 cm in the left lower lobe. Continued   follow-up is recommended.    Calcified thrombus in the left subclavian and left brachiocephalic veins   and the superior vena cava.    Mild rectal wall thickening, may be seen in setting of proctitis.    A 1.9 cm indeterminate left adrenal nodule. Dedicated adrenal protocol CT   or MR may be considered.    Cholelithiasis.         GASTON UMANA M.D., ATTENDING RADIOLOGIST  This document has been electronically signed. Sep  6 2018 11:51AM        < end of copied text >    Cytomegalovirus By PCR (08.21.18 @ 20:39)    CMVPCR Log: NotDetec: **Due to reagent shortages, assay was performed at [facility] (see  [employer] message below).  Please disregard “Plasma CMV DNA Quantification by PCR using Abbott  m2000” message below**  [employer] CMV qPCR Assay range: 96 IU/mL to 1.88 X 10e8 IU/mL  Expected Value:  Not Detected  One IU is equal to 0.53 copies of CMV  The limit of quantitation (LOQ) is 96 IU/mL.  CMV DNA detected below the  LOQ will be reported as Detected: <96 IU/mL.  This test was developed and its performance characteristics determined by  [facility].   It has not been cleared or approved by the [place]. Food and Drug  Administration.  Results should be used in conjunction with clinical findings, and should  not form the sole basis for a diagnosis or treatment decision.  Assay lower limit of detection (LOD):  31.2 IU/mL (1.49 log10/mL)  Assay dynamic range:  50 to 156,000,000 (156M) CMV DNA IU/mL (1.70 log10/mL – 8.19 log10/mL)  Plasma CMV DNA Quantification by PCR using Abbott m2000:  The results of this test should be interpreted with consideration of all  clinical and laboratory findings. In particular, caution should be used  when interpreting low level positive results when the test is used for  diagnostic purposes. Repeat testing on an additional sample is  recommended to confirm low level positive results. A result of "Not  Detected" does not preclude the possibility of infection with CMV.  CMV  DNA may be present below the detection limit of assay. LogIU/mL

## 2018-09-06 NOTE — PROGRESS NOTE ADULT - SUBJECTIVE AND OBJECTIVE BOX
Subjective:    Medications:  acetaminophen   Tablet .. 650 milliGRAM(s) Oral every 6 hours PRN  aspirin enteric coated 81 milliGRAM(s) Oral daily  atovaquone Suspension 1500 milliGRAM(s) Oral daily  Calcium Citrate + Vit D 315 mG/250 IU 2 Tablet(s) 2 Tablet(s) Oral <User Schedule>  dextrose 40% Gel 15 Gram(s) Oral once PRN  dextrose 5%. 1000 milliLiter(s) IV Continuous <Continuous>  dextrose 50% Injectable 12.5 Gram(s) IV Push once  dextrose 50% Injectable 25 Gram(s) IV Push once  dextrose 50% Injectable 25 Gram(s) IV Push once  famotidine    Tablet 20 milliGRAM(s) Oral daily  glucagon  Injectable 1 milliGRAM(s) IntraMuscular once PRN  insulin lispro (HumaLOG) corrective regimen sliding scale   SubCutaneous three times a day before meals  insulin lispro (HumaLOG) corrective regimen sliding scale   SubCutaneous at bedtime  lactated ringers. 1000 milliLiter(s) IV Continuous <Continuous>  metroNIDAZOLE  IVPB 500 milliGRAM(s) IV Intermittent every 8 hours  multivitamin 1 Tablet(s) Oral daily  potassium chloride    Tablet ER 20 milliEquivalent(s) Oral every 2 hours  pravastatin 20 milliGRAM(s) Oral at bedtime  predniSONE   Tablet 3 milliGRAM(s) Oral <User Schedule>  sodium bicarbonate 650 milliGRAM(s) Oral <User Schedule>  sodium chloride 0.9% lock flush 3 milliLiter(s) IV Push every 8 hours  sodium chloride 0.9%. 1000 milliLiter(s) IV Continuous <Continuous>  tacrolimus 0.5 milliGRAM(s) Oral <User Schedule>  vancomycin    Solution 125 milliGRAM(s) Oral every 6 hours  voriconazole 200 milliGRAM(s) Oral every 12 hours      Physical Exam:    Vitals:  Vital Signs Last 24 Hours  T(C): 36.8 (18 @ 12:56), Max: 37.9 (18 @ 03:10)  HR: 125 (18 @ 12:56) (118 - 140)  BP: 121/80 (18 @ 12:56) (111/69 - 129/79)  RR: 18 (18 @ 12:56) (18 - 18)  SpO2: 95% (18 @ 10:20) (95% - 96%)    Weight in k.6 ( @ 08:31)    I&O's Summary    05 Sep 2018 07:  -  06 Sep 2018 07:00  --------------------------------------------------------  IN: 700 mL / OUT: 403 mL / NET: 297 mL    06 Sep 2018 07:  -  06 Sep 2018 14:14  --------------------------------------------------------  IN: 370 mL / OUT: 0 mL / NET: 370 mL        Tele:    General: No distress. Comfortable.  HEENT: EOM intact.  Neck: Neck supple. JVP not elevated. No masses  Chest: Clear to auscultation bilaterally  CV: Normal S1 and S2. No murmurs, rub, or gallops. Radial pulses normal.  Abdomen: Soft, non-distended, non-tender  Skin: No rashes or skin breakdown  Neurology: Alert and oriented times three. Sensation intact  Psych: Affect normal    Labs:                        7.6    6.3   )-----------( 307      ( 06 Sep 2018 07:36 )             24.0         134<L>  |  103  |  35<H>  ----------------------------<  116<H>  3.4<L>   |  18<L>  |  1.36<H>    Ca    8.8      06 Sep 2018 07:35  Mg     1.9         TPro  6.6  /  Alb  3.5  /  TBili  0.9  /  DBili  x   /  AST  29  /  ALT  9<L>  /  AlkPhos  82      PT/INR - ( 05 Sep 2018 17:45 )   PT: 16.4 sec;   INR: 1.51 ratio         PTT - ( 05 Sep 2018 17:45 )  PTT:31.9 sec            Lactate, Blood: 0.6 mmol/L ( @ 17:45) Subjective:  - States he feels a bit better today  - Reports poor appetite  - Loose watery stool x 2, once last night once this morning per his report  - Notes fatigue  - Ambulated to the bathroom without SOB, denies SOB at rest, orthopnea, PND, CP, palpitations, abdominal pain/cramping      Medications:  acetaminophen   Tablet .. 650 milliGRAM(s) Oral every 6 hours PRN  aspirin enteric coated 81 milliGRAM(s) Oral daily  atovaquone Suspension 1500 milliGRAM(s) Oral daily  Calcium Citrate + Vit D 315 mG/250 IU 2 Tablet(s) 2 Tablet(s) Oral <User Schedule>  dextrose 40% Gel 15 Gram(s) Oral once PRN  dextrose 5%. 1000 milliLiter(s) IV Continuous <Continuous>  dextrose 50% Injectable 12.5 Gram(s) IV Push once  dextrose 50% Injectable 25 Gram(s) IV Push once  dextrose 50% Injectable 25 Gram(s) IV Push once  famotidine    Tablet 20 milliGRAM(s) Oral daily  glucagon  Injectable 1 milliGRAM(s) IntraMuscular once PRN  insulin lispro (HumaLOG) corrective regimen sliding scale   SubCutaneous three times a day before meals  insulin lispro (HumaLOG) corrective regimen sliding scale   SubCutaneous at bedtime  lactated ringers. 1000 milliLiter(s) IV Continuous <Continuous>  metroNIDAZOLE  IVPB 500 milliGRAM(s) IV Intermittent every 8 hours  multivitamin 1 Tablet(s) Oral daily  potassium chloride    Tablet ER 20 milliEquivalent(s) Oral every 2 hours  pravastatin 20 milliGRAM(s) Oral at bedtime  predniSONE   Tablet 3 milliGRAM(s) Oral <User Schedule>  sodium bicarbonate 650 milliGRAM(s) Oral <User Schedule>  sodium chloride 0.9% lock flush 3 milliLiter(s) IV Push every 8 hours  sodium chloride 0.9%. 1000 milliLiter(s) IV Continuous <Continuous>  tacrolimus 0.5 milliGRAM(s) Oral <User Schedule>  vancomycin    Solution 125 milliGRAM(s) Oral every 6 hours  voriconazole 200 milliGRAM(s) Oral every 12 hours      Physical Exam:    Vitals:  Vital Signs Last 24 Hours  T(C): 36.8 (18 @ 12:56), Max: 37.9 (18 @ 03:10)  HR: 125 (18 @ 12:56) (118 - 140)  BP: 121/80 (18 @ 12:56) (111/69 - 129/79)  RR: 18 (18 @ 12:56) (18 - 18)  SpO2: 95% (18 @ 10:20) (95% - 96%)    Weight in k.6 ( @ 08:31)    I&O's Summary    05 Sep 2018 07:  -  06 Sep 2018 07:00  --------------------------------------------------------  IN: 700 mL / OUT: 403 mL / NET: 297 mL    06 Sep 2018 07:  -  06 Sep 2018 14:14  --------------------------------------------------------  IN: 370 mL / OUT: 0 mL / NET: 370 mL    Tele: -140s    General: OOB in chair. Toxic appearing, No distress. Comfortable.  HEENT: EOM intact.  Neck: Neck supple. JVP <6cm H2O. No masses  Chest: Diminished RLL  CV: Tachycardic, Normal S1 and S2. III/VI SM LLSB. Radial pulses normal. No LE edema  Abdomen: Soft, non-distended, non-tender + BS  Skin: No rashes or skin breakdown  Neurology: Lethargic, arousable to voice oriented times three. Sensation intact  Msk: R wrist effusion.   Psych: Affect normal    Labs:                        7.6    6.3   )-----------( 307      ( 06 Sep 2018 07:36 )             24.0         134<L>  |  103  |  35<H>  ----------------------------<  116<H>  3.4<L>   |  18<L>  |  1.36<H>    Ca    8.8      06 Sep 2018 07:35  Mg     1.9     09-06    TPro  6.6  /  Alb  3.5  /  TBili  0.9  /  DBili  x   /  AST  29  /  ALT  9<L>  /  AlkPhos  82      PT/INR - ( 05 Sep 2018 17:45 )   PT: 16.4 sec;   INR: 1.51 ratio       PTT - ( 05 Sep 2018 17:45 )  PTT:31.9 sec    Lactate, Blood: 0.6 mmol/L ( @ 17:45)    < from: CT Abdomen and Pelvis w/ Oral Cont (18 @ 22:35) >  IMPRESSION:     Redemonstration of nodular opacities in the right upper and left lower   lobes, with the opacity in the right upper lobe increased in size since   2018, but overall decreased from 2018. The left lower lobe   opacity is stable since 2018. Additional bilateral pulmonary   nodules measuring up to 1.1 cm in the left lower lobe. Continued   follow-up is recommended.    Calcified thrombus in the left subclavian and left brachiocephalic veins   and the superior vena cava.    Mild rectal wall thickening, may be seen in setting of proctitis.    A 1.9 cm indeterminate left adrenal nodule. Dedicated adrenal protocol CT   or MR may be considered.    Cholelithiasis.

## 2018-09-06 NOTE — PROGRESS NOTE ADULT - ASSESSMENT
69 y/o male with PMH NICM HFrEF s/p HM2 LVAD 6/2017, who underwent heart transplant on 2/23/18 (CMV D-/R+ and Toxo D-/R+, Hep C+ heart) with post op graft dysfunction who underwent plasmapheresis and IVIG x2 as well as rituximab, with suspected fungal PNA diagnosed 6/2018  treated w voriconazole, recurrent PNA with new RUL infiltrate on CT scan 8/18, which  improved on broad spectrum antibiotics. S/p lung biopsy 8/18. C diff toxin positive -still  on oral vanco,.  Patient presents today for RHC and cardiac biopsy with c/o ongoing diarrhea (3-4 loose BM daily) and mildly productive cough, denies fevers, chills, abd pain, N/V. EKG demonstrated ST at 133bpm. Pt readmitted for further workup.  9/7  one formed BM this am   CMV studys pending,  BC pending afebrile ,  albumin 500 cc for dehydration  potassium supplemented, transition to reg diet

## 2018-09-07 ENCOUNTER — RESULT REVIEW (OUTPATIENT)
Age: 68
End: 2018-09-07

## 2018-09-07 DIAGNOSIS — R91.8 OTHER NONSPECIFIC ABNORMAL FINDING OF LUNG FIELD: ICD-10-CM

## 2018-09-07 LAB
ANION GAP SERPL CALC-SCNC: 15 MMOL/L — SIGNIFICANT CHANGE UP (ref 5–17)
BUN SERPL-MCNC: 35 MG/DL — HIGH (ref 7–23)
CALCIUM SERPL-MCNC: 9 MG/DL — SIGNIFICANT CHANGE UP (ref 8.4–10.5)
CHLORIDE SERPL-SCNC: 106 MMOL/L — SIGNIFICANT CHANGE UP (ref 96–108)
CMV DNA SPEC QL NAA+PROBE: NOT DETECTED IU/ML
CO2 SERPL-SCNC: 18 MMOL/L — LOW (ref 22–31)
CREAT SERPL-MCNC: 1.16 MG/DL — SIGNIFICANT CHANGE UP (ref 0.5–1.3)
GLUCOSE BLDC GLUCOMTR-MCNC: 85 MG/DL — SIGNIFICANT CHANGE UP (ref 70–99)
GLUCOSE BLDC GLUCOMTR-MCNC: 93 MG/DL — SIGNIFICANT CHANGE UP (ref 70–99)
GLUCOSE BLDC GLUCOMTR-MCNC: 97 MG/DL — SIGNIFICANT CHANGE UP (ref 70–99)
GLUCOSE SERPL-MCNC: 100 MG/DL — HIGH (ref 70–99)
GRAM STN FLD: SIGNIFICANT CHANGE UP
HCT VFR BLD CALC: 25 % — LOW (ref 39–50)
HGB BLD-MCNC: 7.7 G/DL — LOW (ref 13–17)
MAGNESIUM SERPL-MCNC: 1.8 MG/DL — SIGNIFICANT CHANGE UP (ref 1.6–2.6)
MCHC RBC-ENTMCNC: 27.9 PG — SIGNIFICANT CHANGE UP (ref 27–34)
MCHC RBC-ENTMCNC: 30.9 GM/DL — LOW (ref 32–36)
MCV RBC AUTO: 90.2 FL — SIGNIFICANT CHANGE UP (ref 80–100)
PHOSPHATE SERPL-MCNC: 3.1 MG/DL — SIGNIFICANT CHANGE UP (ref 2.5–4.5)
PLATELET # BLD AUTO: 334 K/UL — SIGNIFICANT CHANGE UP (ref 150–400)
POTASSIUM SERPL-MCNC: 4.4 MMOL/L — SIGNIFICANT CHANGE UP (ref 3.5–5.3)
POTASSIUM SERPL-SCNC: 4.4 MMOL/L — SIGNIFICANT CHANGE UP (ref 3.5–5.3)
RAPID RVP RESULT: SIGNIFICANT CHANGE UP
RBC # BLD: 2.77 M/UL — LOW (ref 4.2–5.8)
RBC # FLD: 21 % — HIGH (ref 10.3–14.5)
SODIUM SERPL-SCNC: 139 MMOL/L — SIGNIFICANT CHANGE UP (ref 135–145)
SPECIMEN SOURCE: SIGNIFICANT CHANGE UP
TACROLIMUS SERPL-MCNC: 15 NG/ML — SIGNIFICANT CHANGE UP
WBC # BLD: 7.5 K/UL — SIGNIFICANT CHANGE UP (ref 3.8–10.5)
WBC # FLD AUTO: 7.5 K/UL — SIGNIFICANT CHANGE UP (ref 3.8–10.5)

## 2018-09-07 PROCEDURE — 71045 X-RAY EXAM CHEST 1 VIEW: CPT | Mod: 26

## 2018-09-07 PROCEDURE — 88305 TISSUE EXAM BY PATHOLOGIST: CPT | Mod: 26

## 2018-09-07 PROCEDURE — 32405: CPT

## 2018-09-07 PROCEDURE — 88112 CYTOPATH CELL ENHANCE TECH: CPT | Mod: 26

## 2018-09-07 PROCEDURE — 99233 SBSQ HOSP IP/OBS HIGH 50: CPT | Mod: GC

## 2018-09-07 PROCEDURE — 77012 CT SCAN FOR NEEDLE BIOPSY: CPT | Mod: 26

## 2018-09-07 PROCEDURE — 99233 SBSQ HOSP IP/OBS HIGH 50: CPT

## 2018-09-07 PROCEDURE — 88312 SPECIAL STAINS GROUP 1: CPT | Mod: 26

## 2018-09-07 RX ORDER — METOPROLOL TARTRATE 50 MG
2.5 TABLET ORAL ONCE
Qty: 0 | Refills: 0 | Status: COMPLETED | OUTPATIENT
Start: 2018-09-07 | End: 2018-09-07

## 2018-09-07 RX ORDER — VANCOMYCIN HCL 1 G
1000 VIAL (EA) INTRAVENOUS DAILY
Qty: 0 | Refills: 0 | Status: DISCONTINUED | OUTPATIENT
Start: 2018-09-07 | End: 2018-09-07

## 2018-09-07 RX ORDER — CEFAZOLIN SODIUM 1 G
2000 VIAL (EA) INJECTION EVERY 8 HOURS
Qty: 0 | Refills: 0 | Status: DISCONTINUED | OUTPATIENT
Start: 2018-09-07 | End: 2018-09-07

## 2018-09-07 RX ORDER — TACROLIMUS 5 MG/1
0.5 CAPSULE ORAL ONCE
Qty: 0 | Refills: 0 | Status: COMPLETED | OUTPATIENT
Start: 2018-09-07 | End: 2018-09-07

## 2018-09-07 RX ORDER — CEFEPIME 1 G/1
2000 INJECTION, POWDER, FOR SOLUTION INTRAMUSCULAR; INTRAVENOUS EVERY 8 HOURS
Qty: 0 | Refills: 0 | Status: DISCONTINUED | OUTPATIENT
Start: 2018-09-07 | End: 2018-09-08

## 2018-09-07 RX ORDER — ACETAMINOPHEN 500 MG
1000 TABLET ORAL ONCE
Qty: 0 | Refills: 0 | Status: COMPLETED | OUTPATIENT
Start: 2018-09-07 | End: 2018-09-07

## 2018-09-07 RX ORDER — TACROLIMUS 5 MG/1
0.5 CAPSULE ORAL
Qty: 0 | Refills: 0 | Status: DISCONTINUED | OUTPATIENT
Start: 2018-09-08 | End: 2018-09-09

## 2018-09-07 RX ADMIN — TACROLIMUS 0.5 MILLIGRAM(S): 5 CAPSULE ORAL at 20:10

## 2018-09-07 RX ADMIN — Medication 125 MILLIGRAM(S): at 11:38

## 2018-09-07 RX ADMIN — Medication 125 MILLIGRAM(S): at 00:44

## 2018-09-07 RX ADMIN — CEFEPIME 100 MILLIGRAM(S): 1 INJECTION, POWDER, FOR SOLUTION INTRAMUSCULAR; INTRAVENOUS at 18:15

## 2018-09-07 RX ADMIN — Medication 400 MILLIGRAM(S): at 09:58

## 2018-09-07 RX ADMIN — Medication 2.5 MILLIGRAM(S): at 04:18

## 2018-09-07 RX ADMIN — VORICONAZOLE 200 MILLIGRAM(S): 10 INJECTION, POWDER, LYOPHILIZED, FOR SOLUTION INTRAVENOUS at 05:09

## 2018-09-07 RX ADMIN — Medication 1 TABLET(S): at 11:42

## 2018-09-07 RX ADMIN — SODIUM CHLORIDE 3 MILLILITER(S): 9 INJECTION INTRAMUSCULAR; INTRAVENOUS; SUBCUTANEOUS at 05:11

## 2018-09-07 RX ADMIN — Medication 100 MILLIGRAM(S): at 13:28

## 2018-09-07 RX ADMIN — Medication 3 MILLIGRAM(S): at 07:35

## 2018-09-07 RX ADMIN — SODIUM CHLORIDE 3 MILLILITER(S): 9 INJECTION INTRAMUSCULAR; INTRAVENOUS; SUBCUTANEOUS at 22:41

## 2018-09-07 RX ADMIN — Medication 2.5 MILLIGRAM(S): at 04:44

## 2018-09-07 RX ADMIN — Medication 1000 MILLIGRAM(S): at 10:00

## 2018-09-07 RX ADMIN — Medication 2.5 MILLIGRAM(S): at 03:31

## 2018-09-07 RX ADMIN — Medication 650 MILLIGRAM(S): at 03:32

## 2018-09-07 RX ADMIN — Medication 100 MILLIGRAM(S): at 05:08

## 2018-09-07 RX ADMIN — Medication 650 MILLIGRAM(S): at 07:35

## 2018-09-07 RX ADMIN — Medication 125 MILLIGRAM(S): at 05:09

## 2018-09-07 RX ADMIN — Medication 250 MILLIGRAM(S): at 11:58

## 2018-09-07 RX ADMIN — VORICONAZOLE 200 MILLIGRAM(S): 10 INJECTION, POWDER, LYOPHILIZED, FOR SOLUTION INTRAVENOUS at 18:05

## 2018-09-07 RX ADMIN — Medication 650 MILLIGRAM(S): at 20:10

## 2018-09-07 RX ADMIN — SODIUM CHLORIDE 3 MILLILITER(S): 9 INJECTION INTRAMUSCULAR; INTRAVENOUS; SUBCUTANEOUS at 13:21

## 2018-09-07 RX ADMIN — ATOVAQUONE 1500 MILLIGRAM(S): 750 SUSPENSION ORAL at 11:41

## 2018-09-07 RX ADMIN — Medication 20 MILLIGRAM(S): at 22:38

## 2018-09-07 RX ADMIN — CEFEPIME 100 MILLIGRAM(S): 1 INJECTION, POWDER, FOR SOLUTION INTRAMUSCULAR; INTRAVENOUS at 11:16

## 2018-09-07 RX ADMIN — Medication 650 MILLIGRAM(S): at 04:20

## 2018-09-07 RX ADMIN — Medication 125 MILLIGRAM(S): at 23:42

## 2018-09-07 RX ADMIN — FAMOTIDINE 20 MILLIGRAM(S): 10 INJECTION INTRAVENOUS at 11:38

## 2018-09-07 RX ADMIN — Medication 125 MILLIGRAM(S): at 18:05

## 2018-09-07 RX ADMIN — Medication 81 MILLIGRAM(S): at 11:38

## 2018-09-07 RX ADMIN — Medication 100 MILLIGRAM(S): at 22:40

## 2018-09-07 RX ADMIN — TACROLIMUS 1 MILLIGRAM(S): 5 CAPSULE ORAL at 07:34

## 2018-09-07 NOTE — PROGRESS NOTE ADULT - PROBLEM SELECTOR PLAN 1
- Follow up results of CMV, Bcx, c.diff and GI PCR  - ID following (currently on Vancomycin and Cefazolin)  - CT scan reviewed - underwent repeat biopsy of RUL Mass today - Follow up results of CMV, Bcx, c.diff and GI PCR  - ID following (currently on Vancomycin and Cefepime)  - CT scan reviewed - underwent repeat biopsy of RUL Mass today

## 2018-09-07 NOTE — PROGRESS NOTE ADULT - SUBJECTIVE AND OBJECTIVE BOX
Interventional Radiology     68y Male with PMH as below with h/o heart transplant on 2/23/18 with Right lung mass referred to IR for percutaneous right lung mass biopsy.   Previously pt had IR percutaneous biopsy of Left Lung mass and cytology result was negative for malignancy.  Pt had results from previous Right upper lobe lung mass biopsy on 8/17/18 that showed acute inflammatory process previously.     LUNG, RIGHT, UPPER LOBE, FNA  SIGNIFICANT FINDINGS.  These cytomorphologic findings are indicating an acute  inflammatory process.    CT scan on 9/5/18 demonstrates "Redemonstration of nodular opacities in the right upper and left lower lobes, with the opacity in the right upper lobe increased in size since 08/27/2018, but overall decreased from 08/14/2018."    PAST MEDICAL & SURGICAL HISTORY:  HLD (hyperlipidemia)  Former smoker  DVT of upper extremity (deep vein thrombosis)  Hepatitis C virus  GIB (gastrointestinal bleeding)  Ventricular fibrillation: s/p AICD  PAF (paroxysmal atrial fibrillation): on xarelto  Non-Ischemic Cardiomyopathy  SVT (Supraventricular Tachycardia)  HTN  CHF (Congestive Heart Failure)  S/P right heart catheterization: biopsy multiple  H/O heart transplant: 2/2018  Status post left hip replacement  History of Prior Ablation Treatment: for afib  AICD (Automatic Cardioverter/Defibrillator) Present: [employer] with 1 [employer] lead4/1/09- explanted and replaced with [employer] 2 leads on 9/2/09    Allergies    No Known Allergies    Labs:                        7.7    7.5   )-----------( 334      ( 07 Sep 2018 03:45 )             25.0     09-07    139  |  106  |  35<H>  ----------------------------<  100<H>  4.4   |  18<L>  |  1.16    Ca    9.0      07 Sep 2018 03:45  Phos  3.1     09-07  Mg     1.8     09-07    TPro  6.6  /  Alb  3.5  /  TBili  0.9  /  DBili  x   /  AST  29  /  ALT  9<L>  /  AlkPhos  82  09-05    PT/INR - ( 05 Sep 2018 17:45 )   PT: 16.4 sec;   INR: 1.51 ratio    PTT - ( 05 Sep 2018 17:45 )  PTT:31.9 sec    After risks, benefits, alternatives discussion pt verbalized understanding and signed informed consent.   Will plan for image-guided Right lung mass biopsy.    Mak Phillips, FATMATA-C  [facility] [id number]  Ext [phone]

## 2018-09-07 NOTE — PROGRESS NOTE ADULT - PROBLEM SELECTOR PLAN 2
blood cultures x2   pending  ID following, continue on PO Vanco q6h and IV Flagyl Q8 for C.diff  GI consulted for possible colonoscopy blood cultures x2 pending  ID following, continue on PO Vanco q6h and IV Flagyl Q8 for C.diff  GI consulted for possible colonoscopy

## 2018-09-07 NOTE — PROGRESS NOTE ADULT - PROBLEM SELECTOR PLAN 2
Tacrolimus to - mg in AM/ - mg in PM.  (goal 8-10).  CellCept on hold due to infection.  Prednisone 3 mg po daily.    Antimicrobial prophylaxis:  Intermediate risk CMV - Valcyte initially held due to leukopenia. He is 6 months post-transplant so will monitor CMV PCR. Will follow up CMV PCR sent yesterday.  Intermediate risk Toxo - continue Mepron 1500 mg po daily  PCP prophy - covered by Mepron.  Hep C+ - he completed a course of Mayvret with undetectable Hep C virus now. Will need follow-up increased risk donor testing at 12 months post-OHT.    Other prophylaxis:  Pepcid 20 mg po daily  ECASA 81 mg po daily  Citracal + D 2 tabs po BID  Pravachol 20 mg po QHS.  Please supplement with IV mag PRN for goal of 2-2.5. Please hold Tacrolimus tonight and start 0.5 mg PO in AM/ 0.5 mg PO in PM.  (goal 8-10).  CellCept on hold due to infection.  Prednisone 3 mg po daily.    Antimicrobial prophylaxis:  Intermediate risk CMV - Valcyte initially held due to leukopenia. He is 6 months post-transplant so will monitor CMV PCR. Will follow up CMV PCR sent yesterday.  Intermediate risk Toxo - continue Mepron 1500 mg po daily  PCP prophy - covered by Mepron.  Hep C+ - he completed a course of Mayvret with undetectable Hep C virus now. Will need follow-up increased risk donor testing at 12 months post-OHT.    Other prophylaxis:  Pepcid 20 mg po daily  ECASA 81 mg po daily  Citracal + D 2 tabs po BID  Pravachol 20 mg po QHS.  Please supplement with IV mag PRN for goal of 2-2.5.

## 2018-09-07 NOTE — PROGRESS NOTE ADULT - PROBLEM SELECTOR PLAN 3
s/p 9/7 Right lung FNA   ID following   Continue with Cefepime 2 G IV every 8 hours  Continue voriconazole 200 milliGRAM(s) Oral every 12 hours  Check Voriconazole (T) level in AM     Continue with

## 2018-09-07 NOTE — PROGRESS NOTE ADULT - ASSESSMENT
69 y/o male with PMH NICM HFrEF s/p HM2 LVAD 6/2017, who underwent heart transplant on 2/23/18 (CMV D-/R+ and Toxo D-/R+, Hep C+ heart) with post op graft dysfunction who underwent plasmapheresis and IVIG x2 as well as rituximab, with suspected fungal PNA diagnosed 6/2018  treated w voriconazole, recurrent PNA with new RUL infiltrate on CT scan 8/18, which  improved on broad spectrum antibiotics. S/p lung biopsy 8/18. C diff toxin positive -still  on oral vanco,.  Patient presents today for RHC and cardiac biopsy with c/o ongoing diarrhea (3-4 loose BM daily) and mildly productive cough, denies fevers, chills, abd pain, N/V. EKG demonstrated ST at 133bpm. Pt readmitted for further workup.  9/6 one formed BM this am; CMV study's pending, BC pending afebrile, albumin 500 cc for dehydration  potassium supplemented, transition to regular diet.   9/7 VVS; underwent 69 y/o male with PMH NICM HFrEF s/p HM2 LVAD 6/2017, who underwent heart transplant on 2/23/18 (CMV D-/R+ and Toxo D-/R+, Hep C+ heart) with post op graft dysfunction who underwent plasmapheresis and IVIG x2 as well as rituximab, with suspected fungal PNA diagnosed 6/2018  treated w voriconazole, recurrent PNA with new RUL infiltrate on CT scan 8/18, which  improved on broad spectrum antibiotics. S/p lung biopsy 8/18. C diff toxin positive -still  on oral vanco,.  Patient presents today for RHC and cardiac biopsy with c/o ongoing diarrhea (3-4 loose BM daily) and mildly productive cough, denies fevers, chills, abd pain, N/V. EKG demonstrated ST at 133bpm. Pt readmitted for further workup.  9/6 one formed BM this am; CMV study's pending, BC pending afebrile, albumin 500 cc for dehydration  potassium supplemented, transition to regular diet.   9/7 VVS; underwent IR procedure of Right lung mass FNA.  Tolerated procedure well.  Culture pending. Vanco IV D/C per ID. Continue with current medication regimen. Send Legionella and 67 y/o male with PMH NICM HFrEF s/p HM2 LVAD 6/2017, who underwent heart transplant on 2/23/18 (CMV D-/R+ and Toxo D-/R+, Hep C+ heart) with post op graft dysfunction who underwent plasmapheresis and IVIG x2 as well as rituximab, with suspected fungal PNA diagnosed 6/2018  treated w voriconazole, recurrent PNA with new RUL infiltrate on CT scan 8/18, which  improved on broad spectrum antibiotics. S/p lung biopsy 8/18. C diff toxin positive -still  on oral vanco,.  Patient presents today for RHC and cardiac biopsy with c/o ongoing diarrhea (3-4 loose BM daily) and mildly productive cough, denies fevers, chills, abd pain, N/V. EKG demonstrated ST at 133bpm. Pt readmitted for further workup.  9/6 one formed BM this am; CMV study's pending, BC pending afebrile, albumin 500 cc for dehydration  potassium supplemented, transition to regular diet.   9/7 VVS; underwent IR procedure of Right lung mass FNA.  Tolerated procedure well.  Culture pending. Vanco IV D/C per ID. Continue with current medication regimen. Send Legionella urine culture and check voriconazole (T) in AM   Disposition: Home once medically cleared

## 2018-09-07 NOTE — PROGRESS NOTE ADULT - ATTENDING COMMENTS
Diarrhea resolved on po vanco and IV Flagyl.  S/p aspiration of R lung infiltrate.  Started cefepime.

## 2018-09-07 NOTE — PROGRESS NOTE ADULT - SUBJECTIVE AND OBJECTIVE BOX
VITAL SIGNS    Subjective: "I'm feeling ok today, I want to eat." Reports (+) Cough with hemoptysis  Denies Cp, palpitation, SOB, LOBATO, HA, dizziness, N/V/D,     Telemetry:  NSR / -150     Vital Signs Last 24 Hrs  T(C): 36.7 (09-07-18 @ 19:56), Max: 39.4 (09-07-18 @ 03:14)  T(F): 98 (09-07-18 @ 19:56), Max: 102.9 (09-07-18 @ 03:14)  HR: 129 (09-07-18 @ 19:56) (110 - 152)  BP: 121/81 (09-07-18 @ 19:56) (98/60 - 147/94)  RR: 20 (09-07-18 @ 19:56) (18 - 20)  SpO2: 95% (09-07-18 @ 19:56) (91% - 95%)           09-06 @ 07:01  -  09-07 @ 07:00  --------------------------------------------------------  IN: 1920 mL / OUT: 50 mL / NET: 1870 mL    09-07 @ 07:01  -  09-07 @ 21:19  --------------------------------------------------------  IN: 640 mL / OUT: 0 mL / NET: 640 mL    CAPILLARY BLOOD GLUCOSE    POCT Blood Glucose.: 93 mg/dL (07 Sep 2018 19:34)  POCT Blood Glucose.: 97 mg/dL (07 Sep 2018 11:32)  POCT Blood Glucose.: 102 mg/dL (06 Sep 2018 22:11)                     PHYSICAL EXAM    Neurology: alert and oriented x 3, nonfocal, no gross deficits    CV: (+) S1 and S2, No murmurs, rubs, gallops or clicks     Sternal Wound:  MSI -->CDI , sternum stable    Lungs: CTA B/L     Abdomen: soft, nontender, nondistended, positive bowel sounds, (+) Flatus; (+) BM     :  Voiding               Extremities:  B/L LE (+) edema; negative calf tenderness; (+) 2 DP palpable        acetaminophen Tablet .. 650 milliGRAM(s) Oral every 6 hours PRN  aspirin enteric coated 81 milliGRAM(s) Oral daily  atovaquone Suspension 1500 milliGRAM(s) Oral daily  Calcium Citrate + Vit D 315 mG/250 IU 2 Tablet(s) 2 Tablet(s) Oral <User Schedule>  cefepime   IVPB 2000 milliGRAM(s) IV Intermittent every 8 hours  famotidine Tablet 20 milliGRAM(s) Oral daily  glucagon  Injectable 1 milliGRAM(s) IntraMuscular once PRN  insulin lispro (HumaLOG) corrective regimen sliding scale   SubCutaneous three times a day before meals  insulin lispro (HumaLOG) corrective regimen sliding scale   SubCutaneous at bedtime  lactated ringers. 1000 milliLiter(s) IV Continuous <Continuous>  metroNIDAZOLE  IVPB 500 milliGRAM(s) IV Intermittent every 8 hours  multivitamin 1 Tablet(s) Oral daily  pravastatin 20 milliGRAM(s) Oral at bedtime  predniSONE Tablet 3 milliGRAM(s) Oral <User Schedule>  sodium bicarbonate 650 milliGRAM(s) Oral <User Schedule>  sodium chloride 0.9% lock flush 3 milliLiter(s) IV Push every 8 hours  sodium chloride 0.9%. 1000 milliLiter(s) IV Continuous <Continuous>  vancomycin    Solution 125 milliGRAM(s) Oral every 6 hours  voriconazole 200 milliGRAM(s) Oral every 12 hours                              Physical Therapy Rec:   Home  [  ]   Home w/ PT  [  ]  Rehab  [  ]    Discussed with Cardiothoracic Team at AM rounds. VITAL SIGNS    Subjective: "I'm feeling ok today, I want to eat." Reports (+) Cough with hemoptysis, associated symptoms of fever, chills, and diarrhea overnight.  Denies CP, palpitation, SOB, LOBATO, HA, dizziness, N/V/C, or syncope.     Telemetry:  NSR / -150     Vital Signs Last 24 Hrs  T(C): 36.7 (09-07-18 @ 19:56), Max: 39.4 (09-07-18 @ 03:14)  T(F): 98 (09-07-18 @ 19:56), Max: 102.9 (09-07-18 @ 03:14)  HR: 129 (09-07-18 @ 19:56) (110 - 152)  BP: 121/81 (09-07-18 @ 19:56) (98/60 - 147/94)  RR: 20 (09-07-18 @ 19:56) (18 - 20)  SpO2: 95% (09-07-18 @ 19:56) (91% - 95%)           09-06 @ 07:01  -  09-07 @ 07:00  --------------------------------------------------------  IN: 1920 mL / OUT: 50 mL / NET: 1870 mL    09-07 @ 07:01  -  09-07 @ 21:19  --------------------------------------------------------  IN: 640 mL / OUT: 0 mL / NET: 640 mL    CAPILLARY BLOOD GLUCOSE    POCT Blood Glucose.: 93 mg/dL (07 Sep 2018 19:34)  POCT Blood Glucose.: 97 mg/dL (07 Sep 2018 11:32)  POCT Blood Glucose.: 102 mg/dL (06 Sep 2018 22:11)                     PHYSICAL EXAM    Neurology: alert and oriented x 3, nonfocal, no gross deficits    CV: (+) S1 and S2, No murmurs, rubs, gallops or clicks     Sternal Wound:  MSI -->CDI , sternum stable    Lungs: CTA B/L     Abdomen: soft, nontender, nondistended, positive bowel sounds, (+) Flatus; (+) BM     :  Voiding               Extremities:  B/L LE (+) edema; negative calf tenderness; (+) 2 DP palpable; RUE third digit (+) edema; (+) necrosis the distal portion of the finger.        acetaminophen Tablet .. 650 milliGRAM(s) Oral every 6 hours PRN  aspirin enteric coated 81 milliGRAM(s) Oral daily  atovaquone Suspension 1500 milliGRAM(s) Oral daily  Calcium Citrate + Vit D 315 mG/250 IU 2 Tablet(s) 2 Tablet(s) Oral <User Schedule>  cefepime IVPB 2000 milliGRAM(s) IV Intermittent every 8 hours  famotidine Tablet 20 milliGRAM(s) Oral daily  glucagon  Injectable 1 milliGRAM(s) IntraMuscular once PRN  insulin lispro (HumaLOG) corrective regimen sliding scale   SubCutaneous three times a day before meals  insulin lispro (HumaLOG) corrective regimen sliding scale   SubCutaneous at bedtime  lactated ringers. 1000 milliLiter(s) IV Continuous <Continuous>  metroNIDAZOLE  IVPB 500 milliGRAM(s) IV Intermittent every 8 hours  multivitamin 1 Tablet(s) Oral daily  pravastatin 20 milliGRAM(s) Oral at bedtime  predniSONE Tablet 3 milliGRAM(s) Oral <User Schedule>  sodium bicarbonate 650 milliGRAM(s) Oral <User Schedule>  vancomycin Solution 125 milliGRAM(s) Oral every 6 hours  voriconazole 200 milliGRAM(s) Oral every 12 hours    Physical Therapy Rec:   Home  [  ]   Home w/ PT  [X  ]  Rehab  [  ]    Discussed with Cardiothoracic Team at AM rounds. VITAL SIGNS    Subjective: "I'm feeling ok today, I want to eat." Reports (+) Cough with hemoptysis, associated symptoms of fever, chills, and diarrhea overnight.  Denies CP, palpitation, SOB, LOBATO, HA, dizziness, N/V/C, or syncope.     Telemetry:  NSR / -150     Vital Signs Last 24 Hrs  T(C): 36.7 (09-07-18 @ 19:56), Max: 39.4 (09-07-18 @ 03:14)  T(F): 98 (09-07-18 @ 19:56), Max: 102.9 (09-07-18 @ 03:14)  HR: 129 (09-07-18 @ 19:56) (110 - 152)  BP: 121/81 (09-07-18 @ 19:56) (98/60 - 147/94)  RR: 20 (09-07-18 @ 19:56) (18 - 20)  SpO2: 95% (09-07-18 @ 19:56) (91% - 95%)           09-06 @ 07:01  -  09-07 @ 07:00  --------------------------------------------------------  IN: 1920 mL / OUT: 50 mL / NET: 1870 mL    09-07 @ 07:01  -  09-07 @ 21:19  --------------------------------------------------------  IN: 640 mL / OUT: 0 mL / NET: 640 mL    CAPILLARY BLOOD GLUCOSE    POCT Blood Glucose.: 93 mg/dL (07 Sep 2018 19:34)  POCT Blood Glucose.: 97 mg/dL (07 Sep 2018 11:32)  POCT Blood Glucose.: 102 mg/dL (06 Sep 2018 22:11)                     PHYSICAL EXAM    Neurology: alert and oriented x 3, nonfocal, no gross deficits    CV: (+) S1 and S2, No murmurs, rubs, gallops or clicks     Sternal Wound:  MSI -->CDI , sternum stable    Lungs: CTA B/L     Abdomen: soft, nontender, nondistended, positive bowel sounds, (+) Flatus; (+) BM     :  Voiding               Extremities:  B/L LE (+) edema; negative calf tenderness; (+) 2 DP palpable; RUE third digit (+) edema; (+) necrosis the distal portion of the finger.        acetaminophen Tablet .. 650 milliGRAM(s) Oral every 6 hours PRN  aspirin enteric coated 81 milliGRAM(s) Oral daily  atovaquone Suspension 1500 milliGRAM(s) Oral daily  Calcium Citrate + Vit D 315 mG/250 IU 2 Tablet(s) 2 Tablet(s) Oral <User Schedule>  cefepime IVPB 2000 milliGRAM(s) IV Intermittent every 8 hours  famotidine Tablet 20 milliGRAM(s) Oral daily  glucagon  Injectable 1 milliGRAM(s) IntraMuscular once PRN  insulin lispro (HumaLOG) corrective regimen sliding scale   SubCutaneous three times a day before meals  insulin lispro (HumaLOG) corrective regimen sliding scale   SubCutaneous at bedtime  lactated ringers. 1000 milliLiter(s) IV Continuous <Continuous>  metroNIDAZOLE IVPB 500 milliGRAM(s) IV Intermittent every 8 hours  multivitamin 1 Tablet(s) Oral daily  pravastatin 20 milliGRAM(s) Oral at bedtime  predniSONE Tablet 3 milliGRAM(s) Oral <User Schedule>  sodium bicarbonate 650 milliGRAM(s) Oral <User Schedule>  vancomycin Solution 125 milliGRAM(s) Oral every 6 hours  voriconazole 200 milliGRAM(s) Oral every 12 hours    Physical Therapy Rec:   Home  [  ]   Home w/ PT  [X  ]  Rehab  [  ]    Discussed with Cardiothoracic Team at AM rounds.

## 2018-09-07 NOTE — PROGRESS NOTE ADULT - SUBJECTIVE AND OBJECTIVE BOX
Subjective: Underwent CT-guided biopsy of RUL Mass today.     Medications:  acetaminophen   Tablet .. 650 milliGRAM(s) Oral every 6 hours PRN  aspirin enteric coated 81 milliGRAM(s) Oral daily  atovaquone Suspension 1500 milliGRAM(s) Oral daily  Calcium Citrate + Vit D 315 mG/250 IU 2 Tablet(s) 2 Tablet(s) Oral <User Schedule>  ceFAZolin   IVPB 2000 milliGRAM(s) IV Intermittent every 8 hours  dextrose 40% Gel 15 Gram(s) Oral once PRN  dextrose 5%. 1000 milliLiter(s) IV Continuous <Continuous>  dextrose 50% Injectable 12.5 Gram(s) IV Push once  dextrose 50% Injectable 25 Gram(s) IV Push once  dextrose 50% Injectable 25 Gram(s) IV Push once  famotidine    Tablet 20 milliGRAM(s) Oral daily  glucagon  Injectable 1 milliGRAM(s) IntraMuscular once PRN  insulin lispro (HumaLOG) corrective regimen sliding scale   SubCutaneous three times a day before meals  insulin lispro (HumaLOG) corrective regimen sliding scale   SubCutaneous at bedtime  lactated ringers. 1000 milliLiter(s) IV Continuous <Continuous>  metroNIDAZOLE  IVPB 500 milliGRAM(s) IV Intermittent every 8 hours  multivitamin 1 Tablet(s) Oral daily  pravastatin 20 milliGRAM(s) Oral at bedtime  predniSONE   Tablet 3 milliGRAM(s) Oral <User Schedule>  sodium bicarbonate 650 milliGRAM(s) Oral <User Schedule>  sodium chloride 0.9% lock flush 3 milliLiter(s) IV Push every 8 hours  sodium chloride 0.9%. 1000 milliLiter(s) IV Continuous <Continuous>  tacrolimus 0.5 milliGRAM(s) Oral <User Schedule>  tacrolimus 1 milliGRAM(s) Oral <User Schedule>  vancomycin    Solution 125 milliGRAM(s) Oral every 6 hours  vancomycin  IVPB 1000 milliGRAM(s) IV Intermittent daily  voriconazole 200 milliGRAM(s) Oral every 12 hours    Vitals:  T(C): 39.3 (09-07-18 @ 04:20), Max: 39.4 (09-07-18 @ 03:14)  HR: 147 (09-07-18 @ 03:58) (121 - 152)  BP: 126/86 (09-07-18 @ 03:58) (114/70 - 147/94)  BP(mean): --  ABP: --  ABP(mean): --  RR: 20 (09-07-18 @ 03:14) (18 - 20)  SpO2: 91% (09-07-18 @ 03:14) (91% - 95%)  Wt(kg): --  CVP(cm H2O): --      Weight (kg): 70.8 (09-05 @ 12:20)    I&O's Summary    06 Sep 2018 07:01  -  07 Sep 2018 07:00  --------------------------------------------------------  IN: 1920 mL / OUT: 50 mL / NET: 1870 mL        Physical Exam:  Appearance: No Acute Distress  HEENT: JVP ___ cm H2O, no HJR  Cardiovascular: RRR, Normal S1 S2, No murmurs/rubs/gallops  Respiratory: Clear to auscultation bilaterally  Gastrointestinal: Soft, Non-tender, non-distended	  Skin: no skin lesions  Neurologic: Non-focal  Extremities: No LE edema, warm and well perfused  Psychiatry: A & O x 3, Mood & affect appropriate      Labs:                        7.7    7.5   )-----------( 334      ( 07 Sep 2018 03:45 )             25.0     09-07    139  |  106  |  35<H>  ----------------------------<  100<H>  4.4   |  18<L>  |  1.16    Ca    9.0      07 Sep 2018 03:45  Phos  3.1     09-07  Mg     1.8     09-07    TPro  6.6  /  Alb  3.5  /  TBili  0.9  /  DBili  x   /  AST  29  /  ALT  9<L>  /  AlkPhos  82  09-05      PT/INR - ( 05 Sep 2018 17:45 )   PT: 16.4 sec;   INR: 1.51 ratio         PTT - ( 05 Sep 2018 17:45 )  PTT:31.9 sec            Lactate, Blood: 0.6 mmol/L (09-05 @ 17:45) Subjective: Underwent CT-guided biopsy of RUL Mass today.     Medications:  acetaminophen   Tablet .. 650 milliGRAM(s) Oral every 6 hours PRN  aspirin enteric coated 81 milliGRAM(s) Oral daily  atovaquone Suspension 1500 milliGRAM(s) Oral daily  Calcium Citrate + Vit D 315 mG/250 IU 2 Tablet(s) 2 Tablet(s) Oral <User Schedule>  ceFAZolin   IVPB 2000 milliGRAM(s) IV Intermittent every 8 hours  dextrose 40% Gel 15 Gram(s) Oral once PRN  dextrose 5%. 1000 milliLiter(s) IV Continuous <Continuous>  dextrose 50% Injectable 12.5 Gram(s) IV Push once  dextrose 50% Injectable 25 Gram(s) IV Push once  dextrose 50% Injectable 25 Gram(s) IV Push once  famotidine    Tablet 20 milliGRAM(s) Oral daily  glucagon  Injectable 1 milliGRAM(s) IntraMuscular once PRN  insulin lispro (HumaLOG) corrective regimen sliding scale   SubCutaneous three times a day before meals  insulin lispro (HumaLOG) corrective regimen sliding scale   SubCutaneous at bedtime  lactated ringers. 1000 milliLiter(s) IV Continuous <Continuous>  metroNIDAZOLE  IVPB 500 milliGRAM(s) IV Intermittent every 8 hours  multivitamin 1 Tablet(s) Oral daily  pravastatin 20 milliGRAM(s) Oral at bedtime  predniSONE   Tablet 3 milliGRAM(s) Oral <User Schedule>  sodium bicarbonate 650 milliGRAM(s) Oral <User Schedule>  sodium chloride 0.9% lock flush 3 milliLiter(s) IV Push every 8 hours  sodium chloride 0.9%. 1000 milliLiter(s) IV Continuous <Continuous>  tacrolimus 0.5 milliGRAM(s) Oral <User Schedule>  tacrolimus 1 milliGRAM(s) Oral <User Schedule>  vancomycin    Solution 125 milliGRAM(s) Oral every 6 hours  vancomycin  IVPB 1000 milliGRAM(s) IV Intermittent daily  voriconazole 200 milliGRAM(s) Oral every 12 hours    Vitals:  T(C): 39.3 (09-07-18 @ 04:20), Max: 39.4 (09-07-18 @ 03:14)  HR: 147 (09-07-18 @ 03:58) (121 - 152)  BP: 126/86 (09-07-18 @ 03:58) (114/70 - 147/94)  BP(mean): --  ABP: --  ABP(mean): --  RR: 20 (09-07-18 @ 03:14) (18 - 20)  SpO2: 91% (09-07-18 @ 03:14) (91% - 95%)  Wt(kg): --  CVP(cm H2O): --      Weight (kg): 70.8 (09-05 @ 12:20)    I&O's Summary    06 Sep 2018 07:01  -  07 Sep 2018 07:00  --------------------------------------------------------  IN: 1920 mL / OUT: 50 mL / NET: 1870 mL        Physical Exam:  Appearance: No Acute Distress  HEENT: JVP 6 cm H2O, no HJR  Cardiovascular: RRR, Normal S1 S2, No murmurs/rubs/gallops  Respiratory: Clear to auscultation bilaterally  Gastrointestinal: Soft, Non-tender, non-distended	  Skin: no skin lesions  Neurologic: Non-focal  Extremities: No LE edema, warm and well perfused,. right wrist edema improved compared to prior exam  Psychiatry: A & O x 3, Mood & affect appropriate      Labs:                        7.7    7.5   )-----------( 334      ( 07 Sep 2018 03:45 )             25.0     09-07    139  |  106  |  35<H>  ----------------------------<  100<H>  4.4   |  18<L>  |  1.16    Ca    9.0      07 Sep 2018 03:45  Phos  3.1     09-07  Mg     1.8     09-07    TPro  6.6  /  Alb  3.5  /  TBili  0.9  /  DBili  x   /  AST  29  /  ALT  9<L>  /  AlkPhos  82  09-05      PT/INR - ( 05 Sep 2018 17:45 )   PT: 16.4 sec;   INR: 1.51 ratio         PTT - ( 05 Sep 2018 17:45 )  PTT:31.9 sec            Lactate, Blood: 0.6 mmol/L (09-05 @ 17:45)

## 2018-09-07 NOTE — PROGRESS NOTE ADULT - ASSESSMENT
67 y/o M with history of NICM now s/p heart transplantation on 2/23/18. 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 He has received therapy with IVIG, plamsapheresis, and rituximab. RHC today shows low normal filing pressures and high normal output. His presentation with low grade fevers, malaise, and persistent diarrhea concerning for recurrent infection, despite treatment for presumed fungal pneumonia and c.diff. Last CMV negative on 8/21. Enlargement of RUL opacity noted on chest CT compared with that from 8/27, LLL opacity stable. Mild rectal wall thickening noted, ongoing diarrhea. Increasingly tachycardic today despite 1L I.VF and 500mL albumin. Low grade fever. Normotensive. Tacro level supratherapeutic today.

## 2018-09-07 NOTE — PROGRESS NOTE ADULT - ASSESSMENT
69 yo M s/p heart transplant complicated by rejection with rounds of plasmaphoresis , IVIG, rituximab x2, known colonizer with  Pseudomonas last treated for PNA in June 2018  and again in August 14-30 with cefepime and voriconazole as well as c.diff with PO vanco now presents with decreased PO intake, 4 watery BM daily and a productive cough. Pt had CT guided R lung mass bx on 8/17 which showed acute inflammation but was negative bacterial or fungal etiology. Concerned now for cdiff colitis vs CMV colitis.     last admission labs:   Fungitell negative  Galactomannan negative  Crypto Ag negative  Quant indeterminate, however lung biopsy w negative AFB stain  Pathology report from 8/17 R lung bx showing inflammation w negative AFB and GMS stain  CT guided biopsy cultures are NGTD- only grew a corynebacterium that is likely not a pathogen  Hep C neg (8/22)    Suggest:  continue PO Vancomycin 125mg q6hrs   Add Flagyl 500mg q8hrs     stool for GI PCR- negative  Send CMV PCR- negative    follow up on blood cultures  Pt spiked fevers today. Pt started on cefepime and one dose of Vancomycin    continue Mepron 1500mg daily  Continue Voriconazole 200mg q12hrs - PLEASE GET VORICONAZOLE LEVELS  Monitor Tacrolimus level    Pt s/p IR biopsy  specimen sent for culture, fungal, AFB, and nocardia and pathology  check fungitell and galactomannan  check procalcitonin  please check RVP    ID service will be covering over the weekend and Monday . Please call for acute issues or questions. [phone]

## 2018-09-07 NOTE — PROVIDER CONTACT NOTE (CHANGE IN STATUS NOTIFICATION) - ACTION/TREATMENT ORDERED:
Blood cultures x 2 sent to lab.  Tylenol 650 mg po given x1.  Metoprolol 2.5 mg IV x3 given. see MAR.

## 2018-09-07 NOTE — PROGRESS NOTE ADULT - SUBJECTIVE AND OBJECTIVE BOX
Patient is a 68y old  Male who presents with a chief complaint of Fever (07 Sep 2018 09:32)    Being followed by ID for help with management        Interval history:  pt with increased temperature this morning  s/p biopsy of infiltrate  coughed up some blood after procedure      ROS:  occasional cough  some runny nose  No N/V/D  No abd pain  No urinary complaints  No HA  No joint or limb pain  No other complaints    PAST MEDICAL & SURGICAL HISTORY:  HLD (hyperlipidemia)  Former smoker  DVT of upper extremity (deep vein thrombosis)  Hepatitis C virus  GIB (gastrointestinal bleeding)  Ventricular fibrillation: s/p AICD  PAF (paroxysmal atrial fibrillation): on xarelto  Non-Ischemic Cardiomyopathy  SVT (Supraventricular Tachycardia)  HTN  CHF (Congestive Heart Failure)  S/P right heart catheterization: biopsy multiple  H/O heart transplant: 2/2018  Status post left hip replacement  History of Prior Ablation Treatment: for afib  AICD (Automatic Cardioverter/Defibrillator) Present: [employer] with 1 [employer] lead4/1/09- explanted and replaced with [employer] 2 leads on 9/2/09    Allergies    No Known Allergies    Intolerances      Antimicrobials:    atovaquone Suspension 1500 milliGRAM(s) Oral daily  cefepime   IVPB 2000 milliGRAM(s) IV Intermittent every 8 hours  metroNIDAZOLE  IVPB 500 milliGRAM(s) IV Intermittent every 8 hours  vancomycin    Solution 125 milliGRAM(s) Oral every 6 hours  voriconazole 200 milliGRAM(s) Oral every 12 hours    MEDICATIONS  (STANDING):  aspirin enteric coated 81 milliGRAM(s) Oral daily  atovaquone Suspension 1500 milliGRAM(s) Oral daily  Calcium Citrate + Vit D 315 mG/250 IU 2 Tablet(s) 2 Tablet(s) Oral <User Schedule>  cefepime   IVPB 2000 milliGRAM(s) IV Intermittent every 8 hours  dextrose 5%. 1000 milliLiter(s) (50 mL/Hr) IV Continuous <Continuous>  dextrose 50% Injectable 12.5 Gram(s) IV Push once  dextrose 50% Injectable 25 Gram(s) IV Push once  dextrose 50% Injectable 25 Gram(s) IV Push once  famotidine    Tablet 20 milliGRAM(s) Oral daily  insulin lispro (HumaLOG) corrective regimen sliding scale   SubCutaneous three times a day before meals  insulin lispro (HumaLOG) corrective regimen sliding scale   SubCutaneous at bedtime  lactated ringers. 1000 milliLiter(s) (100 mL/Hr) IV Continuous <Continuous>  metroNIDAZOLE  IVPB 500 milliGRAM(s) IV Intermittent every 8 hours  multivitamin 1 Tablet(s) Oral daily  pravastatin 20 milliGRAM(s) Oral at bedtime  predniSONE   Tablet 3 milliGRAM(s) Oral <User Schedule>  sodium bicarbonate 650 milliGRAM(s) Oral <User Schedule>  sodium chloride 0.9% lock flush 3 milliLiter(s) IV Push every 8 hours  sodium chloride 0.9%. 1000 milliLiter(s) (100 mL/Hr) IV Continuous <Continuous>  tacrolimus 0.5 milliGRAM(s) Oral <User Schedule>  tacrolimus 1 milliGRAM(s) Oral <User Schedule>  vancomycin    Solution 125 milliGRAM(s) Oral every 6 hours  voriconazole 200 milliGRAM(s) Oral every 12 hours      Vital Signs Last 24 Hrs  T(C): 36.8 (09-07-18 @ 11:24), Max: 39.4 (09-07-18 @ 03:14)  T(F): 98.2 (09-07-18 @ 11:24), Max: 102.9 (09-07-18 @ 03:14)  HR: 110 (09-07-18 @ 11:24) (110 - 152)  BP: 98/60 (09-07-18 @ 11:24) (98/60 - 147/94)  BP(mean): --  RR: 20 (09-07-18 @ 11:24) (18 - 20)  SpO2: 95% (09-07-18 @ 11:24) (91% - 95%)    Physical Exam:    Constitutional well preserved,comfortable,pleasant    HEENT PERRLA EOMI,No pallor or icterus    No oral exudate or erythema    Neck supple no JVD or LN    Chest Good AE,CTA    CVS RRR S1 S2 WNl     Abd soft BS normal No tenderness no masses    Ext No cyanosis clubbing or edema    IV site no erythema tenderness or discharge    Joints no swelling or LOM    CNS AAO X 3 no focal    Lab Data:                          7.7    7.5   )-----------( 334      ( 07 Sep 2018 03:45 )             25.0       09-07    139  |  106  |  35<H>  ----------------------------<  100<H>  4.4   |  18<L>  |  1.16    Ca    9.0      07 Sep 2018 03:45  Phos  3.1     09-07  Mg     1.8     09-07    TPro  6.6  /  Alb  3.5  /  TBili  0.9  /  DBili  x   /  AST  29  /  ALT  9<L>  /  AlkPhos  82  09-05          .Blood Blood-Peripheral  09-05-18   No growth to date.  --  --      .Blood Blood-Venous  09-05-18   No growth to date.  --  --                    WBC Count: 7.5 (09-07-18 @ 03:45)  WBC Count: 6.3 (09-06-18 @ 07:36)  WBC Count: 9.8 (09-05-18 @ 12:10)    Cytomegalovirus By PCR (09.05.18 @ 19:06)    Cytomegalovirus By PCR:   Julian

## 2018-09-07 NOTE — PROGRESS NOTE ADULT - SUBJECTIVE AND OBJECTIVE BOX
Interventional Radiology Brief- Operative Note    Procedure: right lung biopsy    Operators: Nigel Aguila    Anesthesia (type): MAC    Contrast: none    EBL: minimal    Findings/Follow up Plan of Care: Peripheral right lung mass biopsy.    Specimens Removed: aspirate/biopsy right lung    Implants: none    Complications: none    Condition/Disposition: stable, to PACU.    Please call Interventional Radiology x [phone] with any questions, concerns, or issues.

## 2018-09-07 NOTE — PROGRESS NOTE ADULT - PROBLEM SELECTOR PLAN 1
Continue tacrolimus, check daily tacro level at 0730  Continue prednisone 3mg daily, mepron 1500mg daily, voriconazole 200 BID.  Continue pepcid, calcium, MVI, sodium bicarb BID.  Continue asa and statin.  Check  daily mg level     waiting   CMV PCR Continue tacrolimus, check daily tacro level at 0730  Continue prednisone 3mg daily, mepron 1500mg daily, voriconazole 200 BID.  Continue pepcid, calcium, MVI, sodium bicarb BID.  Continue asa and statin.  Check  daily mg level  Pending CMV PCR

## 2018-09-08 LAB
ANION GAP SERPL CALC-SCNC: 16 MMOL/L — SIGNIFICANT CHANGE UP (ref 5–17)
BUN SERPL-MCNC: 38 MG/DL — HIGH (ref 7–23)
CALCIUM SERPL-MCNC: 8.8 MG/DL — SIGNIFICANT CHANGE UP (ref 8.4–10.5)
CHLORIDE SERPL-SCNC: 107 MMOL/L — SIGNIFICANT CHANGE UP (ref 96–108)
CO2 SERPL-SCNC: 16 MMOL/L — LOW (ref 22–31)
CREAT SERPL-MCNC: 1.36 MG/DL — HIGH (ref 0.5–1.3)
GLUCOSE BLDC GLUCOMTR-MCNC: 92 MG/DL — SIGNIFICANT CHANGE UP (ref 70–99)
GLUCOSE BLDC GLUCOMTR-MCNC: 98 MG/DL — SIGNIFICANT CHANGE UP (ref 70–99)
GLUCOSE BLDC GLUCOMTR-MCNC: 99 MG/DL — SIGNIFICANT CHANGE UP (ref 70–99)
GLUCOSE SERPL-MCNC: 92 MG/DL — SIGNIFICANT CHANGE UP (ref 70–99)
HCT VFR BLD CALC: 22.9 % — LOW (ref 39–50)
HCT VFR BLD CALC: 23.2 % — LOW (ref 39–50)
HGB BLD-MCNC: 7.1 G/DL — LOW (ref 13–17)
HGB BLD-MCNC: 7.2 G/DL — LOW (ref 13–17)
LEGIONELLA AG UR QL: NEGATIVE — SIGNIFICANT CHANGE UP
MAGNESIUM SERPL-MCNC: 1.6 MG/DL — SIGNIFICANT CHANGE UP (ref 1.6–2.6)
MCHC RBC-ENTMCNC: 27.7 PG — SIGNIFICANT CHANGE UP (ref 27–34)
MCHC RBC-ENTMCNC: 28.2 PG — SIGNIFICANT CHANGE UP (ref 27–34)
MCHC RBC-ENTMCNC: 30.9 GM/DL — LOW (ref 32–36)
MCHC RBC-ENTMCNC: 30.9 GM/DL — LOW (ref 32–36)
MCV RBC AUTO: 89.9 FL — SIGNIFICANT CHANGE UP (ref 80–100)
MCV RBC AUTO: 91.4 FL — SIGNIFICANT CHANGE UP (ref 80–100)
NIGHT BLUE STAIN TISS: SIGNIFICANT CHANGE UP
PLATELET # BLD AUTO: 334 K/UL — SIGNIFICANT CHANGE UP (ref 150–400)
PLATELET # BLD AUTO: ABNORMAL (ref 150–400)
POTASSIUM SERPL-MCNC: 4.3 MMOL/L — SIGNIFICANT CHANGE UP (ref 3.5–5.3)
POTASSIUM SERPL-SCNC: 4.3 MMOL/L — SIGNIFICANT CHANGE UP (ref 3.5–5.3)
RBC # BLD: 2.51 M/UL — LOW (ref 4.2–5.8)
RBC # BLD: 2.58 M/UL — LOW (ref 4.2–5.8)
RBC # FLD: 21.2 % — HIGH (ref 10.3–14.5)
RBC # FLD: 21.5 % — HIGH (ref 10.3–14.5)
SODIUM SERPL-SCNC: 139 MMOL/L — SIGNIFICANT CHANGE UP (ref 135–145)
SPECIMEN SOURCE: SIGNIFICANT CHANGE UP
TACROLIMUS SERPL-MCNC: 13.6 NG/ML — SIGNIFICANT CHANGE UP
WBC # BLD: 6.3 K/UL — SIGNIFICANT CHANGE UP (ref 3.8–10.5)
WBC # BLD: 6.8 K/UL — SIGNIFICANT CHANGE UP (ref 3.8–10.5)
WBC # FLD AUTO: 6.3 K/UL — SIGNIFICANT CHANGE UP (ref 3.8–10.5)
WBC # FLD AUTO: 6.8 K/UL — SIGNIFICANT CHANGE UP (ref 3.8–10.5)

## 2018-09-08 PROCEDURE — 99231 SBSQ HOSP IP/OBS SF/LOW 25: CPT

## 2018-09-08 PROCEDURE — 99233 SBSQ HOSP IP/OBS HIGH 50: CPT

## 2018-09-08 PROCEDURE — 99233 SBSQ HOSP IP/OBS HIGH 50: CPT | Mod: GC

## 2018-09-08 RX ORDER — MAGNESIUM SULFATE 500 MG/ML
1 VIAL (ML) INJECTION ONCE
Qty: 0 | Refills: 0 | Status: COMPLETED | OUTPATIENT
Start: 2018-09-08 | End: 2018-09-08

## 2018-09-08 RX ORDER — ACETAMINOPHEN 500 MG
650 TABLET ORAL EVERY 6 HOURS
Qty: 0 | Refills: 0 | Status: DISCONTINUED | OUTPATIENT
Start: 2018-09-08 | End: 2018-09-26

## 2018-09-08 RX ORDER — CEFEPIME 1 G/1
2000 INJECTION, POWDER, FOR SOLUTION INTRAMUSCULAR; INTRAVENOUS EVERY 12 HOURS
Qty: 0 | Refills: 0 | Status: DISCONTINUED | OUTPATIENT
Start: 2018-09-08 | End: 2018-09-12

## 2018-09-08 RX ADMIN — Medication 100 GRAM(S): at 12:12

## 2018-09-08 RX ADMIN — CEFEPIME 100 MILLIGRAM(S): 1 INJECTION, POWDER, FOR SOLUTION INTRAMUSCULAR; INTRAVENOUS at 02:05

## 2018-09-08 RX ADMIN — Medication 650 MILLIGRAM(S): at 19:53

## 2018-09-08 RX ADMIN — SODIUM CHLORIDE 3 MILLILITER(S): 9 INJECTION INTRAMUSCULAR; INTRAVENOUS; SUBCUTANEOUS at 14:31

## 2018-09-08 RX ADMIN — TACROLIMUS 0.5 MILLIGRAM(S): 5 CAPSULE ORAL at 08:22

## 2018-09-08 RX ADMIN — CEFEPIME 100 MILLIGRAM(S): 1 INJECTION, POWDER, FOR SOLUTION INTRAMUSCULAR; INTRAVENOUS at 10:47

## 2018-09-08 RX ADMIN — Medication 100 MILLIGRAM(S): at 21:19

## 2018-09-08 RX ADMIN — Medication 20 MILLIGRAM(S): at 21:19

## 2018-09-08 RX ADMIN — SODIUM CHLORIDE 3 MILLILITER(S): 9 INJECTION INTRAMUSCULAR; INTRAVENOUS; SUBCUTANEOUS at 21:27

## 2018-09-08 RX ADMIN — VORICONAZOLE 200 MILLIGRAM(S): 10 INJECTION, POWDER, LYOPHILIZED, FOR SOLUTION INTRAVENOUS at 18:08

## 2018-09-08 RX ADMIN — FAMOTIDINE 20 MILLIGRAM(S): 10 INJECTION INTRAVENOUS at 12:12

## 2018-09-08 RX ADMIN — VORICONAZOLE 200 MILLIGRAM(S): 10 INJECTION, POWDER, LYOPHILIZED, FOR SOLUTION INTRAVENOUS at 06:31

## 2018-09-08 RX ADMIN — Medication 100 MILLIGRAM(S): at 14:32

## 2018-09-08 RX ADMIN — Medication 81 MILLIGRAM(S): at 12:12

## 2018-09-08 RX ADMIN — Medication 650 MILLIGRAM(S): at 07:30

## 2018-09-08 RX ADMIN — SODIUM CHLORIDE 3 MILLILITER(S): 9 INJECTION INTRAMUSCULAR; INTRAVENOUS; SUBCUTANEOUS at 06:31

## 2018-09-08 RX ADMIN — Medication 125 MILLIGRAM(S): at 06:31

## 2018-09-08 RX ADMIN — Medication 650 MILLIGRAM(S): at 06:54

## 2018-09-08 RX ADMIN — Medication 125 MILLIGRAM(S): at 18:08

## 2018-09-08 RX ADMIN — ATOVAQUONE 1500 MILLIGRAM(S): 750 SUSPENSION ORAL at 14:31

## 2018-09-08 RX ADMIN — Medication 650 MILLIGRAM(S): at 08:22

## 2018-09-08 RX ADMIN — Medication 1 TABLET(S): at 12:12

## 2018-09-08 RX ADMIN — TACROLIMUS 0.5 MILLIGRAM(S): 5 CAPSULE ORAL at 19:53

## 2018-09-08 RX ADMIN — Medication 125 MILLIGRAM(S): at 12:12

## 2018-09-08 RX ADMIN — Medication 125 MILLIGRAM(S): at 23:58

## 2018-09-08 RX ADMIN — Medication 3 MILLIGRAM(S): at 08:23

## 2018-09-08 RX ADMIN — Medication 100 MILLIGRAM(S): at 06:31

## 2018-09-08 NOTE — PROGRESS NOTE ADULT - ASSESSMENT
69 yo M s/p heart transplant complicated by rejection with rounds of plasmaphoresis , IVIG, rituximab x2, known colonizer with  Pseudomonas last treated for PNA in June 2018  and again in August 14-30 with cefepime and voriconazole as well as c.diff with PO vanco now presents with decreased PO intake, 4 watery BM daily and a productive cough. Pt had CT guided R lung mass bx on 8/17 which showed acute inflammation but was negative bacterial or fungal etiology. Concerned now for cdiff colitis vs CMV colitis.     last admission labs:   Fungitell negative  Galactomannan negative  Crypto Ag negative  Quant indeterminate, however lung biopsy w negative AFB stain  Pathology report from 8/17 R lung bx showing inflammation w negative AFB and GMS stain  CT guided biopsy cultures are NGTD- only grew a corynebacterium that is likely not a pathogen  Hep C neg (8/22)    Suggest:  Continue PO Vancomycin 125mg q6hrs and flagyl 500 mg q 8 hours - now with pasty stools  Stool for GI PCR- negative  CMV PCR- negative  F/U blood cultures - NGTD  Febrile this morning - would continue cefepime   Monitor CrCl while on abx  Continue Mepron 1500mg daily  Continue Voriconazole 200mg q12hrs - F/U voriconazole level  Monitor Tacrolimus level  RVP neg  Pt s/p IR biopsy  specimen sent for culture, fungal, AFB, and nocardia and pathology  F/U fungitell and galactomannan 69 yo M s/p heart transplant complicated by rejection with rounds of plasmaphoresis , IVIG, rituximab x2, known colonizer with  Pseudomonas last treated for PNA in June 2018  and again in August 14-30 with cefepime and voriconazole as well as c.diff with PO vanco now presents with decreased PO intake, 4 watery BM daily and a productive cough. Pt had CT guided R lung mass bx on 8/17 which showed acute inflammation but was negative bacterial or fungal etiology. Concerned now for cdiff colitis vs CMV colitis.     last admission labs:   Fungitell negative  Galactomannan negative  Crypto Ag negative  Quant indeterminate, however lung biopsy w negative AFB stain  Pathology report from 8/17 R lung bx showing inflammation w negative AFB and GMS stain  CT guided biopsy cultures are NGTD- only grew a corynebacterium that is likely not a pathogen  Hep C neg (8/22)    Suggest:  Continue PO Vancomycin 125mg q6hrs and flagyl 500 mg q 8 hours - now with pasty stools  Stool for GI PCR- negative  CMV PCR- negative  F/U blood cultures - NGTD  Febrile this morning - would continue cefepime can decrease to 2 grams IV q 12 hours  Monitor CrCl while on abx  Continue Mepron 1500mg daily  Continue Voriconazole 200mg q12hrs - F/U voriconazole level  Monitor Tacrolimus level  RVP neg  Pt s/p IR biopsy  specimen sent for culture, fungal, AFB, and nocardia and pathology  F/U fungitell and galactomannan

## 2018-09-08 NOTE — PROGRESS NOTE ADULT - PROBLEM SELECTOR PLAN 1
Continue tacrolimus, check daily tacro level at 0730  Continue prednisone 3mg daily, mepron 1500mg daily, voriconazole 200 BID.  Continue pepcid, calcium, MVI, sodium bicarb BID.  Continue asa and statin.  Check  daily mg level  today 1.6 supplemented  Pending CMV PCR  aspergillius pending

## 2018-09-08 NOTE — PROGRESS NOTE ADULT - PROBLEM SELECTOR PLAN 3
s/p 9/7 Right lung FNA   ID following    as per ID Dr Bruce Cefepime  decreased 2 G IV every 12 hours  Continue voriconazole 200 milliGRAM(s) Oral every 12 hours  Check Voriconazole (T) level in AM send and pending

## 2018-09-08 NOTE — PROGRESS NOTE ADULT - ASSESSMENT
67 y/o male with PMH NICM HFrEF s/p HM2 LVAD 6/2017, who underwent heart transplant on 2/23/18 (CMV D-/R+ and Toxo D-/R+, Hep C+ heart) with post op graft dysfunction who underwent plasmapheresis and IVIG x2 as well as rituximab, with suspected fungal PNA diagnosed 6/2018  treated w voriconazole, recurrent PNA with new RUL infiltrate on CT scan 8/18, which  improved on broad spectrum antibiotics. S/p lung biopsy 8/18. C diff toxin positive -still  on oral vanco,.  Patient presents today for RHC and cardiac biopsy with c/o ongoing diarrhea (3-4 loose BM daily) and mildly productive cough, denies fevers, chills, abd pain, N/V. EKG demonstrated ST at 133bpm. Pt readmitted for further workup.  9/6 one formed BM this am; CMV study's pending, BC pending afebrile, albumin 500 cc for dehydration  potassium supplemented, transition to regular diet.   9/7 VVS; underwent IR procedure of Right lung mass FNA.  Tolerated procedure well.  Culture pending. Vanco IV D/C per ID. Continue with current medication regimen. Send Legionella urine culture and check voriconazole (T) in AM   Disposition: Home once medically cleared   9/8 + fever x1 101.7 as per ID cefepine decreased to 2g bid ivpb

## 2018-09-08 NOTE — PROGRESS NOTE ADULT - PROBLEM SELECTOR PLAN 1
- Follow up results of CMV, Bcx, c.diff and GI PCR  - ID following (currently on Vancomycin and Cefepime)  - CT scan reviewed - underwent repeat biopsy of RUL Mass

## 2018-09-08 NOTE — PROGRESS NOTE ADULT - SUBJECTIVE AND OBJECTIVE BOX
Seen and examined. Resting comfortable. + fevers in am t max 101.7   VITAL SIGNS    Telemetry:  sr  Vital Signs Last 24 Hrs  T(C): 38.7 (09-08-18 @ 06:30), Max: 38.7 (09-08-18 @ 06:30)  T(F): 101.7 (09-08-18 @ 06:30), Max: 101.7 (09-08-18 @ 06:30)  HR: 123 (09-08-18 @ 06:30) (110 - 129)  BP: 111/71 (09-08-18 @ 06:30) (98/60 - 121/81)  RR: 19 (09-08-18 @ 06:30) (19 - 20)  SpO2: 94% (09-08-18 @ 06:30) (93% - 95%)            09-07 @ 07:01  -  09-08 @ 07:00  --------------------------------------------------------  IN: 890 mL / OUT: 450 mL / NET: 440 mL    09-08 @ 07:01  -  09-08 @ 11:17  --------------------------------------------------------  IN: 120 mL / OUT: 0 mL / NET: 120 mL       Daily     Daily   Admit Wt: Drug Dosing Weight  Height (cm): 172.72 (05 Sep 2018 12:20)  Weight (kg): 70.8 (05 Sep 2018 12:20)  BMI (kg/m2): 23.7 (05 Sep 2018 12:20)  BSA (m2): 1.84 (05 Sep 2018 12:20)      CAPILLARY BLOOD GLUCOSE      POCT Blood Glucose.: 98 mg/dL (08 Sep 2018 07:56)  POCT Blood Glucose.: 85 mg/dL (07 Sep 2018 21:40)  POCT Blood Glucose.: 93 mg/dL (07 Sep 2018 19:34)  POCT Blood Glucose.: 97 mg/dL (07 Sep 2018 11:32)          MEDICATIONS  acetaminophen   Tablet .. 650 milliGRAM(s) Oral every 6 hours PRN  aspirin enteric coated 81 milliGRAM(s) Oral daily  atovaquone Suspension 1500 milliGRAM(s) Oral daily  Calcium Citrate + Vit D 315mg/200 IU 2 Tablet(s) 2 Tablet(s) Oral <User Schedule>  cefepime   IVPB 2000 milliGRAM(s) IV Intermittent every 12 hours  dextrose 40% Gel 15 Gram(s) Oral once PRN  dextrose 5%. 1000 milliLiter(s) IV Continuous <Continuous>  dextrose 50% Injectable 12.5 Gram(s) IV Push once  dextrose 50% Injectable 25 Gram(s) IV Push once  dextrose 50% Injectable 25 Gram(s) IV Push once  famotidine    Tablet 20 milliGRAM(s) Oral daily  glucagon  Injectable 1 milliGRAM(s) IntraMuscular once PRN  insulin lispro (HumaLOG) corrective regimen sliding scale   SubCutaneous three times a day before meals  insulin lispro (HumaLOG) corrective regimen sliding scale   SubCutaneous at bedtime  lactated ringers. 1000 milliLiter(s) IV Continuous <Continuous>  magnesium sulfate  IVPB 1 Gram(s) IV Intermittent once  metroNIDAZOLE  IVPB 500 milliGRAM(s) IV Intermittent every 8 hours  multivitamin 1 Tablet(s) Oral daily  pravastatin 20 milliGRAM(s) Oral at bedtime  predniSONE   Tablet 3 milliGRAM(s) Oral <User Schedule>  sodium bicarbonate 650 milliGRAM(s) Oral <User Schedule>  sodium chloride 0.9% lock flush 3 milliLiter(s) IV Push every 8 hours  sodium chloride 0.9%. 1000 milliLiter(s) IV Continuous <Continuous>  tacrolimus 0.5 milliGRAM(s) Oral <User Schedule>  vancomycin    Solution 125 milliGRAM(s) Oral every 6 hours  voriconazole 200 milliGRAM(s) Oral every 12 hours      PHYSICAL EXAM    Subjective:  Neurology: alert and oriented x 3, nonfocal, no gross deficits  CV : s1s1 reg no MRGS  Sternal Wound :  CDI , Stable healed   Lungs: CTA, equal chest expansion  Abdomen: soft, nontender, nondistended, positive bowel sounds, last bowel movement 9/8 + loose stoolx1  :    voids  Extremities: Rt groin bx side CDI   no edema,    b/l groins no hematoma, distal pulses  b/l , no calf tenderness b/l , warm and perfused b/l    LABS  09-08    139  |  107  |  38<H>  ----------------------------<  92  4.3   |  16<L>  |  1.36<H>    Ca    8.8      08 Sep 2018 06:38  Phos  3.1     09-07  Mg     1.6     09-08                                   7.2    6.8   )-----------( 334      ( 08 Sep 2018 09:13 )             23.2                   PAST MEDICAL & SURGICAL HISTORY:  HLD (hyperlipidemia)  Former smoker  DVT of upper extremity (deep vein thrombosis)  Hepatitis C virus  GIB (gastrointestinal bleeding)  Ventricular fibrillation: s/p AICD  PAF (paroxysmal atrial fibrillation): on xarelto  Non-Ischemic Cardiomyopathy  SVT (Supraventricular Tachycardia)  HTN  CHF (Congestive Heart Failure)  S/P right heart catheterization: biopsy multiple  H/O heart transplant: 2/2018  Status post left hip replacement  History of Prior Ablation Treatment: for afib  AICD (Automatic Cardioverter/Defibrillator) Present: [employer] with 1 [employer] lead4/1/09- explanted and replaced with Medtronic 2 leads on 9/2/09       Physical Therapy Rec:   Home  [x ]   Home w/ PT  [  ]  Rehab  [  ]

## 2018-09-08 NOTE — PROGRESS NOTE ADULT - SUBJECTIVE AND OBJECTIVE BOX
CC: Patient is a 68y old  Male who presents with a chief complaint of Fever (08 Sep 2018 09:31)    ID following for help with management    Interval History/ROS: Patient states had one pasty bowel movement yesterday and one overnight. Occasional cough. Otherwise, no other complaints.    Rest of ROS negative.    Allergies  No Known Allergies    ANTIMICROBIALS:  atovaquone Suspension 1500 daily  cefepime   IVPB 2000 every 8 hours  metroNIDAZOLE  IVPB 500 every 8 hours  vancomycin    Solution 125 every 6 hours  voriconazole 200 every 12 hours    PE:    Vital Signs Last 24 Hrs  T(C): 38.7 (08 Sep 2018 06:30), Max: 38.7 (08 Sep 2018 06:30)  T(F): 101.7 (08 Sep 2018 06:30), Max: 101.7 (08 Sep 2018 06:30)  HR: 123 (08 Sep 2018 06:30) (110 - 129)  BP: 111/71 (08 Sep 2018 06:30) (98/60 - 121/81)  BP(mean): --  RR: 19 (08 Sep 2018 06:30) (19 - 20)  SpO2: 94% (08 Sep 2018 06:30) (93% - 95%)    Gen: AOx3, NAD, non-toxic, pleasant  CV: S1+S2 normal, no murmurs  Resp: Clear bilat, no resp distress  Abd: Soft, nontender, +BS  Ext: No LE edema, no wounds  : No Rosas  IV/Skin: No thrombophlebitis  Neuro: no focal deficits    LABS:                          7.2    6.8   )-----------( 334      ( 08 Sep 2018 09:13 )             23.2       09-08    139  |  107  |  38<H>  ----------------------------<  92  4.3   |  16<L>  |  1.36<H>    Ca    8.8      08 Sep 2018 06:38  Phos  3.1     09-07  Mg     1.6     09-08            MICROBIOLOGY:  v  .Body Fluid Pleural Fluid  09-07-18 --  --    Moderate polymorphonuclear leukocytes per low power field  No organisms seen per oil power field      .Blood Blood  09-07-18   No growth to date.  --  --      .Blood Blood-Peripheral  09-05-18   No growth to date.  --  --      .Blood Blood-Venous  09-05-18   No growth to date.  --  --      .Body Fluid Lung - Upper Lobe Right  08-17-18   Few Corynebacterium species  "Susceptibilities not performed"  --    polymorphonuclear leukocytes seen  No organisms seen  by cytocentrifuge      .Urine Clean Catch (Midstream)  08-15-18   No growth  --  --      .Blood Blood-Peripheral  08-14-18   No growth at 5 days.  --  --      .Abscess Arm - Right  08-14-18   No growth at 5 days  --  --      .Sputum Sputum  08-14-18   Normal Respiratory Heaven present  --    Rare polymorphonuclear leukocytes per low power field  Rare Squamous epithelial cells per low power field  Moderate Gram Negative Rods per oil power field        Toxoplasma IgG Screen: 4.7 IU/mL (06-14-18 @ 09:42)  HIV-1 RNA Quantitative, Viral Load Log: NOT DET. lg /mL (05-31-18 @ 19:10)  CMV IgG Antibody: 5.40 U/mL (05-25-18 @ 17:51)  CMV IgG Antibody: >10.00 U/mL (02-22-18 @ 23:45)  HIV-1 RNA Quantitative, Viral Load Log: NOT DET. lg /mL (02-02-18 @ 19:25)    Rapid RVP Result: NotDetec (09-07 @ 15:32)    RADIOLOGY:    Findings/Follow up Plan of Care: Peripheral right lung mass biopsy.    Specimens Removed: aspirate/biopsy right lung< from: Xray Chest 1 View- PORTABLE-Routine (09.07.18 @ 05:49) >  FINDINGS/  IMPRESSION:    Right lower lobe peripheral and bibasilar opacities are unchanged. Small   right pleural effusion. No pneumothorax.    < end of copied text >

## 2018-09-08 NOTE — PROGRESS NOTE ADULT - ASSESSMENT
69 y/o M with history of NICM now s/p heart transplantation on 2/23/18. 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 He has received therapy with IVIG, plamsapheresis, and rituximab. RHC 9/5 shows low normal filing pressures and high normal output. His presentation with low grade fevers, malaise, and persistent diarrhea concerning for recurrent infection, despite treatment for presumed fungal pneumonia and c.diff. Last CMV negative on 8/21. Enlargement of RUL opacity noted on chest CT compared with that from 8/27, LLL opacity stable. Mild rectal wall thickening noted, ongoing diarrhea.

## 2018-09-08 NOTE — PROGRESS NOTE ADULT - SUBJECTIVE AND OBJECTIVE BOX
Patient seen and examined at bedside.    Overnight Events: Still complaining of same diarrhea and sputum production with a cough. No other complaints.     Review Of Systems: No chest pain, shortness of breath, or palpitations            Medications:  acetaminophen   Tablet .. 650 milliGRAM(s) Oral every 6 hours PRN  aspirin enteric coated 81 milliGRAM(s) Oral daily  atovaquone Suspension 1500 milliGRAM(s) Oral daily  Calcium Citrate + Vit D 315mg/200 IU 2 Tablet(s) 2 Tablet(s) Oral <User Schedule>  cefepime   IVPB 2000 milliGRAM(s) IV Intermittent every 8 hours  dextrose 40% Gel 15 Gram(s) Oral once PRN  dextrose 5%. 1000 milliLiter(s) IV Continuous <Continuous>  dextrose 50% Injectable 12.5 Gram(s) IV Push once  dextrose 50% Injectable 25 Gram(s) IV Push once  dextrose 50% Injectable 25 Gram(s) IV Push once  famotidine    Tablet 20 milliGRAM(s) Oral daily  glucagon  Injectable 1 milliGRAM(s) IntraMuscular once PRN  insulin lispro (HumaLOG) corrective regimen sliding scale   SubCutaneous three times a day before meals  insulin lispro (HumaLOG) corrective regimen sliding scale   SubCutaneous at bedtime  lactated ringers. 1000 milliLiter(s) IV Continuous <Continuous>  metroNIDAZOLE  IVPB 500 milliGRAM(s) IV Intermittent every 8 hours  multivitamin 1 Tablet(s) Oral daily  pravastatin 20 milliGRAM(s) Oral at bedtime  predniSONE   Tablet 3 milliGRAM(s) Oral <User Schedule>  sodium bicarbonate 650 milliGRAM(s) Oral <User Schedule>  sodium chloride 0.9% lock flush 3 milliLiter(s) IV Push every 8 hours  sodium chloride 0.9%. 1000 milliLiter(s) IV Continuous <Continuous>  tacrolimus 0.5 milliGRAM(s) Oral <User Schedule>  vancomycin    Solution 125 milliGRAM(s) Oral every 6 hours  voriconazole 200 milliGRAM(s) Oral every 12 hours      PAST MEDICAL & SURGICAL HISTORY:  HLD (hyperlipidemia)  Former smoker  DVT of upper extremity (deep vein thrombosis)  Hepatitis C virus  GIB (gastrointestinal bleeding)  Ventricular fibrillation: s/p AICD  PAF (paroxysmal atrial fibrillation): on xarelto  Non-Ischemic Cardiomyopathy  SVT (Supraventricular Tachycardia)  HTN  CHF (Congestive Heart Failure)  S/P right heart catheterization: biopsy multiple  H/O heart transplant: 2/2018  Status post left hip replacement  History of Prior Ablation Treatment: for afib  AICD (Automatic Cardioverter/Defibrillator) Present: [employer] with 1 [employer] lead4/1/09- explanted and replaced with Medtronic 2 leads on 9/2/09      Vitals:  T(F): 101.7 (09-08), Max: 101.7 (09-08)  HR: 123 (09-08) (110 - 129)  BP: 111/71 (09-08) (98/60 - 121/81)  RR: 19 (09-08)  SpO2: 94% (09-08)  I&O's Summary    07 Sep 2018 07:01  -  08 Sep 2018 07:00  --------------------------------------------------------  IN: 890 mL / OUT: 450 mL / NET: 440 mL        Physical Exam:  Appearance: No Acute Distress  HEENT: JVP 6 cm H2O, no HJR  Cardiovascular: RRR, Normal S1 S2, No murmurs/rubs/gallops  Respiratory: Clear to auscultation bilaterally  Gastrointestinal: Soft, Non-tender, non-distended	  Skin: no skin lesions  Neurologic: Non-focal  Extremities: No LE edema, warm and well perfused,. right wrist edema improved compared to prior exam  Psychiatry: A & O x 3, Mood & affect appropriate                          7.2    6.8   )-----------( 334      ( 08 Sep 2018 09:13 )             23.2     09-08    139  |  107  |  38<H>  ----------------------------<  92  4.3   |  16<L>  |  1.36<H>    Ca    8.8      08 Sep 2018 06:38  Phos  3.1     09-07  Mg     1.6     09-08    Interpretation of Telemetry:  -120s Patient seen and examined at bedside.    Overnight Events: Still complaining of same diarrhea and sputum production with a cough. No other complaints.     Review Of Systems: No chest pain, shortness of breath, or palpitations            Medications:  acetaminophen   Tablet .. 650 milliGRAM(s) Oral every 6 hours PRN  aspirin enteric coated 81 milliGRAM(s) Oral daily  atovaquone Suspension 1500 milliGRAM(s) Oral daily  Calcium Citrate + Vit D 315mg/200 IU 2 Tablet(s) 2 Tablet(s) Oral <User Schedule>  cefepime   IVPB 2000 milliGRAM(s) IV Intermittent every 8 hours  dextrose 40% Gel 15 Gram(s) Oral once PRN  dextrose 5%. 1000 milliLiter(s) IV Continuous <Continuous>  dextrose 50% Injectable 12.5 Gram(s) IV Push once  dextrose 50% Injectable 25 Gram(s) IV Push once  dextrose 50% Injectable 25 Gram(s) IV Push once  famotidine    Tablet 20 milliGRAM(s) Oral daily  glucagon  Injectable 1 milliGRAM(s) IntraMuscular once PRN  insulin lispro (HumaLOG) corrective regimen sliding scale   SubCutaneous three times a day before meals  insulin lispro (HumaLOG) corrective regimen sliding scale   SubCutaneous at bedtime  lactated ringers. 1000 milliLiter(s) IV Continuous <Continuous>  metroNIDAZOLE  IVPB 500 milliGRAM(s) IV Intermittent every 8 hours  multivitamin 1 Tablet(s) Oral daily  pravastatin 20 milliGRAM(s) Oral at bedtime  predniSONE   Tablet 3 milliGRAM(s) Oral <User Schedule>  sodium bicarbonate 650 milliGRAM(s) Oral <User Schedule>  sodium chloride 0.9% lock flush 3 milliLiter(s) IV Push every 8 hours  sodium chloride 0.9%. 1000 milliLiter(s) IV Continuous <Continuous>  tacrolimus 0.5 milliGRAM(s) Oral <User Schedule>  vancomycin    Solution 125 milliGRAM(s) Oral every 6 hours  voriconazole 200 milliGRAM(s) Oral every 12 hours      PAST MEDICAL & SURGICAL HISTORY:  HLD (hyperlipidemia)  Former smoker  DVT of upper extremity (deep vein thrombosis)  Hepatitis C virus  GIB (gastrointestinal bleeding)  Ventricular fibrillation: s/p AICD  PAF (paroxysmal atrial fibrillation): on xarelto  Non-Ischemic Cardiomyopathy  SVT (Supraventricular Tachycardia)  HTN  CHF (Congestive Heart Failure)  S/P right heart catheterization: biopsy multiple  H/O heart transplant: 2/2018  Status post left hip replacement  History of Prior Ablation Treatment: for afib  AICD (Automatic Cardioverter/Defibrillator) Present: [employer] with 1 [employer] lead4/1/09- explanted and replaced with Medtronic 2 leads on 9/2/09      Vitals:  T(F): 101.7 (09-08), Max: 101.7 (09-08)  HR: 123 (09-08) (110 - 129)  BP: 111/71 (09-08) (98/60 - 121/81)  RR: 19 (09-08)  SpO2: 94% (09-08)  I&O's Summary    07 Sep 2018 07:01  -  08 Sep 2018 07:00  --------------------------------------------------------  IN: 890 mL / OUT: 450 mL / NET: 440 mL        Physical Exam:  Appearance: No Acute Distress  HEENT: JVP 6 cm H2O, no HJR  Cardiovascular: RRR, Normal S1 S2, No murmurs/rubs/gallops  Respiratory: Clear to auscultation bilaterally  Gastrointestinal: Soft, Non-tender, non-distended	  Skin: no skin lesions  Neurologic: Non-focal  Extremities: No LE edema, warm and well perfused,. right wrist edema improved compared to prior exam  Psychiatry: A & O x 3, Mood & affect appropriate                          7.2    6.8   )-----------( 334      ( 08 Sep 2018 09:13 )             23.2     09-08    139  |  107  |  38<H>  ----------------------------<  92  4.3   |  16<L>  |  1.36<H>    Ca    8.8      08 Sep 2018 06:38  Phos  3.1     09-07  Mg     1.6     09-08    Tacrolimus level 9/7: 15    Interpretation of Telemetry:  -120s Patient seen and examined at bedside.    Overnight Events: Still complaining of same diarrhea and sputum production with a cough. No other complaints.     Review Of Systems: No chest pain, shortness of breath, or palpitations            Medications:  acetaminophen   Tablet .. 650 milliGRAM(s) Oral every 6 hours PRN  aspirin enteric coated 81 milliGRAM(s) Oral daily  atovaquone Suspension 1500 milliGRAM(s) Oral daily  Calcium Citrate + Vit D 315mg/200 IU 2 Tablet(s) 2 Tablet(s) Oral <User Schedule>  cefepime   IVPB 2000 milliGRAM(s) IV Intermittent every 8 hours  dextrose 40% Gel 15 Gram(s) Oral once PRN  dextrose 5%. 1000 milliLiter(s) IV Continuous <Continuous>  dextrose 50% Injectable 12.5 Gram(s) IV Push once  dextrose 50% Injectable 25 Gram(s) IV Push once  dextrose 50% Injectable 25 Gram(s) IV Push once  famotidine    Tablet 20 milliGRAM(s) Oral daily  glucagon  Injectable 1 milliGRAM(s) IntraMuscular once PRN  insulin lispro (HumaLOG) corrective regimen sliding scale   SubCutaneous three times a day before meals  insulin lispro (HumaLOG) corrective regimen sliding scale   SubCutaneous at bedtime  lactated ringers. 1000 milliLiter(s) IV Continuous <Continuous>  metroNIDAZOLE  IVPB 500 milliGRAM(s) IV Intermittent every 8 hours  multivitamin 1 Tablet(s) Oral daily  pravastatin 20 milliGRAM(s) Oral at bedtime  predniSONE   Tablet 3 milliGRAM(s) Oral <User Schedule>  sodium bicarbonate 650 milliGRAM(s) Oral <User Schedule>  sodium chloride 0.9% lock flush 3 milliLiter(s) IV Push every 8 hours  sodium chloride 0.9%. 1000 milliLiter(s) IV Continuous <Continuous>  tacrolimus 0.5 milliGRAM(s) Oral <User Schedule>  vancomycin    Solution 125 milliGRAM(s) Oral every 6 hours  voriconazole 200 milliGRAM(s) Oral every 12 hours      PAST MEDICAL & SURGICAL HISTORY:  HLD (hyperlipidemia)  Former smoker  DVT of upper extremity (deep vein thrombosis)  Hepatitis C virus  GIB (gastrointestinal bleeding)  Ventricular fibrillation: s/p AICD  PAF (paroxysmal atrial fibrillation): on xarelto  Non-Ischemic Cardiomyopathy  SVT (Supraventricular Tachycardia)  HTN  CHF (Congestive Heart Failure)  S/P right heart catheterization: biopsy multiple  H/O heart transplant: 2/2018  Status post left hip replacement  History of Prior Ablation Treatment: for afib  AICD (Automatic Cardioverter/Defibrillator) Present: [employer] with 1 [employer] lead4/1/09- explanted and replaced with Medtronic 2 leads on 9/2/09      Vitals:  T(F): 101.7 (09-08), Max: 101.7 (09-08)  HR: 123 (09-08) (110 - 129)  BP: 111/71 (09-08) (98/60 - 121/81)  RR: 19 (09-08)  SpO2: 94% (09-08)  I&O's Summary    07 Sep 2018 07:01  -  08 Sep 2018 07:00  --------------------------------------------------------  IN: 890 mL / OUT: 450 mL / NET: 440 mL        Physical Exam:  Appearance: No Acute Distress  HEENT: JVP 6 cm H2O, no HJR  Cardiovascular: RRR, Normal S1 S2, No murmurs/rubs/gallops  Respiratory: Clear to auscultation bilaterally  Gastrointestinal: Soft, Non-tender, non-distended	  Skin: no skin lesions  Neurologic: Non-focal  Extremities: No LE edema, warm and well perfused,. right wrist edema improved compared to prior exam  Psychiatry: A & O x 3, Mood & affect appropriate                          7.2    6.8   )-----------( 334      ( 08 Sep 2018 09:13 )             23.2     09-08    139  |  107  |  38<H>  ----------------------------<  92  4.3   |  16<L>  |  1.36<H>    Ca    8.8      08 Sep 2018 06:38  Phos  3.1     09-07  Mg     1.6     09-08    Tacrolimus level: 13.6    Interpretation of Telemetry:  -120s

## 2018-09-08 NOTE — PROGRESS NOTE ADULT - ATTENDING COMMENTS
Had 2 pasty BMs. No more watery diarrhea.  Still on IV Flagyl and po vanco.  Febrile this AM. Cefepime reduces to 2g iv bid.  Lung aspirate results pending.  Scr slightly up. No more IVF. Will monitor.  Tacro 0.5 bid. Tacro level 13.6. Continue current dose.

## 2018-09-08 NOTE — PROGRESS NOTE ADULT - PROBLEM SELECTOR PLAN 2
Cont Tacrolimus at a reduced dose of 0.5 mg PO in AM/ 0.5 mg PO in PM.  (goal 8-10).  CellCept on hold due to infection.  Prednisone 3 mg po daily.    Antimicrobial prophylaxis:  Intermediate risk CMV - Valcyte initially held due to leukopenia. He is 6 months post-transplant so will monitor CMV PCR. Will follow up CMV PCR sent yesterday.  Intermediate risk Toxo - continue Mepron 1500 mg po daily  PCP prophy - covered by Mepron.  Hep C+ - he completed a course of Mayvret with undetectable Hep C virus now. Will need follow-up increased risk donor testing at 12 months post-OHT.    Other prophylaxis:  Pepcid 20 mg po daily  ECASA 81 mg po daily  Citracal + D 2 tabs po BID  Pravachol 20 mg po QHS.  Please supplement with IV mag PRN for goal of 2-2.5.

## 2018-09-09 LAB
ANION GAP SERPL CALC-SCNC: 14 MMOL/L — SIGNIFICANT CHANGE UP (ref 5–17)
BILIRUB SERPL-MCNC: 0.6 MG/DL — SIGNIFICANT CHANGE UP (ref 0.2–1.2)
BLD GP AB SCN SERPL QL: NEGATIVE — SIGNIFICANT CHANGE UP
BUN SERPL-MCNC: 44 MG/DL — HIGH (ref 7–23)
CALCIUM SERPL-MCNC: 8.6 MG/DL — SIGNIFICANT CHANGE UP (ref 8.4–10.5)
CHLORIDE SERPL-SCNC: 106 MMOL/L — SIGNIFICANT CHANGE UP (ref 96–108)
CO2 SERPL-SCNC: 18 MMOL/L — LOW (ref 22–31)
CREAT SERPL-MCNC: 1.37 MG/DL — HIGH (ref 0.5–1.3)
GLUCOSE BLDC GLUCOMTR-MCNC: 110 MG/DL — HIGH (ref 70–99)
GLUCOSE BLDC GLUCOMTR-MCNC: 111 MG/DL — HIGH (ref 70–99)
GLUCOSE BLDC GLUCOMTR-MCNC: 112 MG/DL — HIGH (ref 70–99)
GLUCOSE BLDC GLUCOMTR-MCNC: 93 MG/DL — SIGNIFICANT CHANGE UP (ref 70–99)
GLUCOSE SERPL-MCNC: 103 MG/DL — HIGH (ref 70–99)
GRAM STN FLD: SIGNIFICANT CHANGE UP
HAPTOGLOB SERPL-MCNC: 270 MG/DL — HIGH (ref 34–200)
HAPTOGLOB SERPL-MCNC: 274 MG/DL — HIGH (ref 34–200)
HCT VFR BLD CALC: 21.3 % — LOW (ref 39–50)
HCT VFR BLD CALC: 25.4 % — LOW (ref 39–50)
HGB BLD-MCNC: 6.5 G/DL — CRITICAL LOW (ref 13–17)
HGB BLD-MCNC: 8.2 G/DL — LOW (ref 13–17)
IRON SATN MFR SERPL: 14 UG/DL — LOW (ref 45–165)
IRON SATN MFR SERPL: 8 % — LOW (ref 16–55)
LDH SERPL L TO P-CCNC: 441 U/L — HIGH (ref 50–242)
MAGNESIUM SERPL-MCNC: 1.9 MG/DL — SIGNIFICANT CHANGE UP (ref 1.6–2.6)
MCHC RBC-ENTMCNC: 27.3 PG — SIGNIFICANT CHANGE UP (ref 27–34)
MCHC RBC-ENTMCNC: 28.8 PG — SIGNIFICANT CHANGE UP (ref 27–34)
MCHC RBC-ENTMCNC: 30.6 GM/DL — LOW (ref 32–36)
MCHC RBC-ENTMCNC: 32.4 GM/DL — SIGNIFICANT CHANGE UP (ref 32–36)
MCV RBC AUTO: 89 FL — SIGNIFICANT CHANGE UP (ref 80–100)
MCV RBC AUTO: 89.1 FL — SIGNIFICANT CHANGE UP (ref 80–100)
PLATELET # BLD AUTO: 344 K/UL — SIGNIFICANT CHANGE UP (ref 150–400)
PLATELET # BLD AUTO: 387 K/UL — SIGNIFICANT CHANGE UP (ref 150–400)
POTASSIUM SERPL-MCNC: 3.9 MMOL/L — SIGNIFICANT CHANGE UP (ref 3.5–5.3)
POTASSIUM SERPL-SCNC: 3.9 MMOL/L — SIGNIFICANT CHANGE UP (ref 3.5–5.3)
RBC # BLD: 2.4 M/UL — LOW (ref 4.2–5.8)
RBC # BLD: 2.84 M/UL — LOW (ref 4.2–5.8)
RBC # BLD: 2.85 M/UL — LOW (ref 4.2–5.8)
RBC # FLD: 20.6 % — HIGH (ref 10.3–14.5)
RBC # FLD: 21.2 % — HIGH (ref 10.3–14.5)
RETICS #: 28.8 K/UL — SIGNIFICANT CHANGE UP (ref 25–125)
RETICS/RBC NFR: 1 % — SIGNIFICANT CHANGE UP (ref 0.5–2.5)
RH IG SCN BLD-IMP: POSITIVE — SIGNIFICANT CHANGE UP
SODIUM SERPL-SCNC: 138 MMOL/L — SIGNIFICANT CHANGE UP (ref 135–145)
TACROLIMUS SERPL-MCNC: 11.8 NG/ML — SIGNIFICANT CHANGE UP
TIBC SERPL-MCNC: 168 UG/DL — LOW (ref 220–430)
UIBC SERPL-MCNC: 154 UG/DL — SIGNIFICANT CHANGE UP (ref 110–370)
WBC # BLD: 5.6 K/UL — SIGNIFICANT CHANGE UP (ref 3.8–10.5)
WBC # BLD: 5.8 K/UL — SIGNIFICANT CHANGE UP (ref 3.8–10.5)
WBC # FLD AUTO: 5.6 K/UL — SIGNIFICANT CHANGE UP (ref 3.8–10.5)
WBC # FLD AUTO: 5.8 K/UL — SIGNIFICANT CHANGE UP (ref 3.8–10.5)

## 2018-09-09 PROCEDURE — 99223 1ST HOSP IP/OBS HIGH 75: CPT | Mod: GC

## 2018-09-09 PROCEDURE — 99233 SBSQ HOSP IP/OBS HIGH 50: CPT | Mod: GC

## 2018-09-09 RX ORDER — TACROLIMUS 5 MG/1
0.25 CAPSULE ORAL
Qty: 0 | Refills: 0 | Status: DISCONTINUED | OUTPATIENT
Start: 2018-09-09 | End: 2018-09-14

## 2018-09-09 RX ORDER — TACROLIMUS 5 MG/1
0.5 CAPSULE ORAL
Qty: 0 | Refills: 0 | Status: DISCONTINUED | OUTPATIENT
Start: 2018-09-09 | End: 2018-09-14

## 2018-09-09 RX ORDER — SODIUM CHLORIDE 9 MG/ML
250 INJECTION INTRAMUSCULAR; INTRAVENOUS; SUBCUTANEOUS ONCE
Qty: 0 | Refills: 0 | Status: COMPLETED | OUTPATIENT
Start: 2018-09-09 | End: 2018-09-09

## 2018-09-09 RX ADMIN — Medication 1 TABLET(S): at 13:19

## 2018-09-09 RX ADMIN — Medication 125 MILLIGRAM(S): at 17:57

## 2018-09-09 RX ADMIN — TACROLIMUS 0.25 MILLIGRAM(S): 5 CAPSULE ORAL at 20:08

## 2018-09-09 RX ADMIN — CEFEPIME 100 MILLIGRAM(S): 1 INJECTION, POWDER, FOR SOLUTION INTRAMUSCULAR; INTRAVENOUS at 17:57

## 2018-09-09 RX ADMIN — Medication 81 MILLIGRAM(S): at 13:18

## 2018-09-09 RX ADMIN — Medication 100 MILLIGRAM(S): at 14:00

## 2018-09-09 RX ADMIN — ATOVAQUONE 1500 MILLIGRAM(S): 750 SUSPENSION ORAL at 13:19

## 2018-09-09 RX ADMIN — Medication 100 MILLIGRAM(S): at 05:44

## 2018-09-09 RX ADMIN — FAMOTIDINE 20 MILLIGRAM(S): 10 INJECTION INTRAVENOUS at 13:19

## 2018-09-09 RX ADMIN — SODIUM CHLORIDE 3 MILLILITER(S): 9 INJECTION INTRAMUSCULAR; INTRAVENOUS; SUBCUTANEOUS at 13:42

## 2018-09-09 RX ADMIN — VORICONAZOLE 200 MILLIGRAM(S): 10 INJECTION, POWDER, LYOPHILIZED, FOR SOLUTION INTRAVENOUS at 05:44

## 2018-09-09 RX ADMIN — Medication 100 MILLIGRAM(S): at 22:14

## 2018-09-09 RX ADMIN — Medication 650 MILLIGRAM(S): at 08:03

## 2018-09-09 RX ADMIN — Medication 650 MILLIGRAM(S): at 20:08

## 2018-09-09 RX ADMIN — VORICONAZOLE 200 MILLIGRAM(S): 10 INJECTION, POWDER, LYOPHILIZED, FOR SOLUTION INTRAVENOUS at 17:57

## 2018-09-09 RX ADMIN — CEFEPIME 100 MILLIGRAM(S): 1 INJECTION, POWDER, FOR SOLUTION INTRAMUSCULAR; INTRAVENOUS at 05:42

## 2018-09-09 RX ADMIN — Medication 20 MILLIGRAM(S): at 22:16

## 2018-09-09 RX ADMIN — TACROLIMUS 0.5 MILLIGRAM(S): 5 CAPSULE ORAL at 08:03

## 2018-09-09 RX ADMIN — Medication 125 MILLIGRAM(S): at 23:29

## 2018-09-09 RX ADMIN — SODIUM CHLORIDE 500 MILLILITER(S): 9 INJECTION INTRAMUSCULAR; INTRAVENOUS; SUBCUTANEOUS at 10:00

## 2018-09-09 RX ADMIN — SODIUM CHLORIDE 3 MILLILITER(S): 9 INJECTION INTRAMUSCULAR; INTRAVENOUS; SUBCUTANEOUS at 05:50

## 2018-09-09 RX ADMIN — SODIUM CHLORIDE 3 MILLILITER(S): 9 INJECTION INTRAMUSCULAR; INTRAVENOUS; SUBCUTANEOUS at 22:29

## 2018-09-09 RX ADMIN — Medication 125 MILLIGRAM(S): at 05:44

## 2018-09-09 RX ADMIN — Medication 125 MILLIGRAM(S): at 13:19

## 2018-09-09 RX ADMIN — Medication 3 MILLIGRAM(S): at 08:04

## 2018-09-09 NOTE — PROGRESS NOTE ADULT - SUBJECTIVE AND OBJECTIVE BOX
VITAL SIGNS    Telemetry:    Vital Signs Last 24 Hrs  T(C): 36.9 (18 @ 13:48), Max: 37.1 (18 @ 00:21)  T(F): 98.4 (18 @ 13:48), Max: 98.7 (18 @ 00:21)  HR: 120 (18 @ 13:48) (110 - 120)  BP: 106/77 (18 @ 13:48) (98/65 - 113/79)  RR: 18 (18 @ 13:48) (18 - 18)  SpO2: 95% (18 @ 13:48) (93% - 95%)             @ 07:01  -   @ 07:00  --------------------------------------------------------  IN: 870 mL / OUT: 302 mL / NET: 568 mL     @ 07:01  -   @ 16:02  --------------------------------------------------------  IN: 0 mL / OUT: 300 mL / NET: -300 mL       Daily     Daily Weight in k (09 Sep 2018 07:58)  Admit Wt: Drug Dosing Weight  Height (cm): 172.72 (05 Sep 2018 12:20)  Weight (kg): 70.8 (05 Sep 2018 12:20)  BMI (kg/m2): 23.7 (05 Sep 2018 12:20)  BSA (m2): 1.84 (05 Sep 2018 12:20)      CAPILLARY BLOOD GLUCOSE      POCT Blood Glucose.: 112 mg/dL (09 Sep 2018 11:23)  POCT Blood Glucose.: 110 mg/dL (09 Sep 2018 07:28)  POCT Blood Glucose.: 92 mg/dL (08 Sep 2018 22:02)  POCT Blood Glucose.: 99 mg/dL (08 Sep 2018 19:21)          MEDICATIONS  acetaminophen   Tablet .. 650 milliGRAM(s) Oral every 6 hours PRN  aspirin enteric coated 81 milliGRAM(s) Oral daily  atovaquone Suspension 1500 milliGRAM(s) Oral daily  Calcium Citrate + Vit D 315mg/200 IU 2 Tablet(s) 2 Tablet(s) Oral <User Schedule>  cefepime   IVPB 2000 milliGRAM(s) IV Intermittent every 12 hours  dextrose 40% Gel 15 Gram(s) Oral once PRN  dextrose 5%. 1000 milliLiter(s) IV Continuous <Continuous>  dextrose 50% Injectable 12.5 Gram(s) IV Push once  dextrose 50% Injectable 25 Gram(s) IV Push once  dextrose 50% Injectable 25 Gram(s) IV Push once  famotidine    Tablet 20 milliGRAM(s) Oral daily  glucagon  Injectable 1 milliGRAM(s) IntraMuscular once PRN  insulin lispro (HumaLOG) corrective regimen sliding scale   SubCutaneous three times a day before meals  insulin lispro (HumaLOG) corrective regimen sliding scale   SubCutaneous at bedtime  lactated ringers. 1000 milliLiter(s) IV Continuous <Continuous>  metroNIDAZOLE  IVPB 500 milliGRAM(s) IV Intermittent every 8 hours  multivitamin 1 Tablet(s) Oral daily  pravastatin 20 milliGRAM(s) Oral at bedtime  predniSONE   Tablet 3 milliGRAM(s) Oral <User Schedule>  sodium bicarbonate 650 milliGRAM(s) Oral <User Schedule>  sodium chloride 0.9% lock flush 3 milliLiter(s) IV Push every 8 hours  sodium chloride 0.9%. 1000 milliLiter(s) IV Continuous <Continuous>  tacrolimus 0.5 milliGRAM(s) Oral <User Schedule>  tacrolimus    0.5 mG/mL Suspension 0.25 milliGRAM(s) Oral <User Schedule>  vancomycin    Solution 125 milliGRAM(s) Oral every 6 hours  voriconazole 200 milliGRAM(s) Oral every 12 hours      PHYSICAL EXAM  Subjective: Pt states he is ok. 1 liquid/soft BM today  Neurology: alert and oriented x 3, nonfocal, no gross deficits  CV : s1, S2  Sternal Wound :  CDI , Stable  Lungs: Diminished at bases  Abdomen: soft, NT,ND, (+) Bowel sounds  :  voiding  Extremities:   -C/C/E. Neg calve tenderness b/l. Pedal pulses 2+ b/l    LABS      138  |  106  |  44<H>  ----------------------------<  103<H>  3.9   |  18<L>  |  1.37<H>    Ca    8.6      09 Sep 2018 07:38  Mg     1.9                                        6.5    5.6   )-----------( 344      ( 09 Sep 2018 07:38 )             21.3

## 2018-09-09 NOTE — PROGRESS NOTE ADULT - PROBLEM SELECTOR PLAN 4
- Stable without over s/s of bleeding  - FOBT to be sent - H/H drop today  - recheck CBC and transfuse for H<7  - FOBT and hemolysis labs  - heme consult for possible BM biopsy

## 2018-09-09 NOTE — PROGRESS NOTE ADULT - ASSESSMENT
67 y/o male with PMH NICM HFrEF s/p HM2 LVAD 6/2017, who underwent heart transplant on 2/23/18 (CMV D-/R+ and Toxo D-/R+, Hep C+ heart) with post op graft dysfunction who underwent plasmapheresis and IVIG x2 as well as rituximab, with suspected fungal PNA diagnosed 6/2018  treated w voriconazole, recurrent PNA with new RUL infiltrate on CT scan 8/18, which  improved on broad spectrum antibiotics. S/p lung biopsy 8/18. C diff toxin positive -still  on oral vanco,.  Patient presents today for RHC and cardiac biopsy with c/o ongoing diarrhea (3-4 loose BM daily) and mildly productive cough, denies fevers, chills, abd pain, N/V. EKG demonstrated ST at 133bpm. Pt readmitted for further workup.  9/6 one formed BM this am; CMV study's pending, BC pending afebrile, albumin 500 cc for dehydration  potassium supplemented, transition to regular diet.   9/7 VVS; underwent IR procedure of Right lung mass FNA.  Tolerated procedure well.  Culture pending. Vanco IV D/C per ID. Continue with current medication regimen. Send Legionella urine culture and check voriconazole (T) in AM   Disposition: Home once medically cleared   9/8 + fever x1 101.7 as per ID cefepine decreased to 2g bid ivpb  9/9 VSS. 1 liquid/soft BM. Heme consulted for neutropenia possible bone marrow Bx. Deferring Bx at this time until anemia work up complete. Tacro 11.8. F/U HF recs

## 2018-09-09 NOTE — PROGRESS NOTE ADULT - PROBLEM SELECTOR PLAN 2
Cont Tacrolimus at a reduced dose of 0.5 mg PO in AM/ 0.5 mg PO in PM.  (goal 8-10).  CellCept on hold due to infection.  Prednisone 3 mg po daily.    Antimicrobial prophylaxis:  Intermediate risk CMV - Valcyte initially held due to leukopenia. He is 6 months post-transplant so will monitor CMV PCR. Will follow up CMV PCR sent yesterday.  Intermediate risk Toxo - continue Mepron 1500 mg po daily  PCP prophy - covered by Mepron.  Hep C+ - he completed a course of Mayvret with undetectable Hep C virus now. Will need follow-up increased risk donor testing at 12 months post-OHT.    Other prophylaxis:  Pepcid 20 mg po daily  ECASA 81 mg po daily  Citracal + D 2 tabs po BID  Pravachol 20 mg po QHS.  Please supplement with IV mag PRN for goal of 2-2.5. Decrease Tacrolimus dose to 0.5 mg PO in AM/ 0.25 mg PO suspension in PM.  (goal 8-10, currently 11.8).  CellCept on hold due to infection.  Prednisone 3 mg po daily.    Antimicrobial prophylaxis:  Intermediate risk CMV - Valcyte initially held due to leukopenia. He is 6 months post-transplant so will monitor CMV PCR. Will follow up CMV PCR sent yesterday.  Intermediate risk Toxo - continue Mepron 1500 mg po daily  PCP prophy - covered by Mepron.  Hep C+ - he completed a course of Mayvret with undetectable Hep C virus now. Will need follow-up increased risk donor testing at 12 months post-OHT.    Other prophylaxis:  Pepcid 20 mg po daily  ECASA 81 mg po daily  Citracal + D 2 tabs po BID  Pravachol 20 mg po QHS.  Please supplement with IV mag PRN for goal of 2-2.5.

## 2018-09-09 NOTE — PROGRESS NOTE ADULT - SUBJECTIVE AND OBJECTIVE BOX
Patient seen and examined at bedside.    Overnight Events:     Review Of Systems: No chest pain, shortness of breath, or palpitations            Medications:  acetaminophen   Tablet .. 650 milliGRAM(s) Oral every 6 hours PRN  aspirin enteric coated 81 milliGRAM(s) Oral daily  atovaquone Suspension 1500 milliGRAM(s) Oral daily  Calcium Citrate + Vit D 315mg/200 IU 2 Tablet(s) 2 Tablet(s) Oral <User Schedule>  cefepime   IVPB 2000 milliGRAM(s) IV Intermittent every 12 hours  dextrose 40% Gel 15 Gram(s) Oral once PRN  dextrose 5%. 1000 milliLiter(s) IV Continuous <Continuous>  dextrose 50% Injectable 12.5 Gram(s) IV Push once  dextrose 50% Injectable 25 Gram(s) IV Push once  dextrose 50% Injectable 25 Gram(s) IV Push once  famotidine    Tablet 20 milliGRAM(s) Oral daily  glucagon  Injectable 1 milliGRAM(s) IntraMuscular once PRN  insulin lispro (HumaLOG) corrective regimen sliding scale   SubCutaneous three times a day before meals  insulin lispro (HumaLOG) corrective regimen sliding scale   SubCutaneous at bedtime  lactated ringers. 1000 milliLiter(s) IV Continuous <Continuous>  metroNIDAZOLE  IVPB 500 milliGRAM(s) IV Intermittent every 8 hours  multivitamin 1 Tablet(s) Oral daily  pravastatin 20 milliGRAM(s) Oral at bedtime  predniSONE   Tablet 3 milliGRAM(s) Oral <User Schedule>  sodium bicarbonate 650 milliGRAM(s) Oral <User Schedule>  sodium chloride 0.9% lock flush 3 milliLiter(s) IV Push every 8 hours  sodium chloride 0.9%. 1000 milliLiter(s) IV Continuous <Continuous>  tacrolimus 0.5 milliGRAM(s) Oral <User Schedule>  vancomycin    Solution 125 milliGRAM(s) Oral every 6 hours  voriconazole 200 milliGRAM(s) Oral every 12 hours      PAST MEDICAL & SURGICAL HISTORY:  HLD (hyperlipidemia)  Former smoker  DVT of upper extremity (deep vein thrombosis)  Hepatitis C virus  GIB (gastrointestinal bleeding)  Ventricular fibrillation: s/p AICD  PAF (paroxysmal atrial fibrillation): on xarelto  Non-Ischemic Cardiomyopathy  SVT (Supraventricular Tachycardia)  HTN  CHF (Congestive Heart Failure)  S/P right heart catheterization: biopsy multiple  H/O heart transplant: 2/2018  Status post left hip replacement  History of Prior Ablation Treatment: for afib  AICD (Automatic Cardioverter/Defibrillator) Present: [employer] with 1 [employer] lead4/1/09- explanted and replaced with Medtronic 2 leads on 9/2/09      Vitals:  T(F): 98.7 (09-09), Max: 98.7 (09-09)  HR: 120 (09-09) (110 - 120)  BP: 113/79 (09-09) (95/60 - 113/79)  RR: 18 (09-09)  SpO2: 93% (09-09)  I&O's Summary    07 Sep 2018 07:01  -  08 Sep 2018 07:00  --------------------------------------------------------  IN: 890 mL / OUT: 450 mL / NET: 440 mL    08 Sep 2018 07:01  -  09 Sep 2018 06:51  --------------------------------------------------------  IN: 870 mL / OUT: 302 mL / NET: 568 mL        Physical Exam:  Appearance: No acute distress; well appearing  Eyes: PERRL, EOMI, pink conjunctiva  HENT: Normal oral muscosa  Cardiovascular: RRR, S1, S2, no murmurs, rubs, or gallops; no edema; no JVD  Respiratory: Clear to auscultation bilaterally  Gastrointestinal: soft, non-tender, non-distended with normal bowel sounds  Musculoskeletal: No clubbing; no joint deformity   Neurologic: Non-focal  Lymphatic: No lymphadenopathy  Psychiatry: AAOx3, mood & affect appropriate  Skin: No rashes, ecchymoses, or cyanosis                          7.2    6.8   )-----------( 334      ( 08 Sep 2018 09:13 )             23.2     09-08    139  |  107  |  38<H>  ----------------------------<  92  4.3   |  16<L>  |  1.36<H>    Ca    8.8      08 Sep 2018 06:38  Mg     1.6     09-08    Tacrolimus level - pending                New ECG(s): Personally reviewed    Echo:    Stress Testing:     Cath:    Imaging:    Interpretation of Telemetry: Patient seen and examined at bedside.    Overnight Events:     Review Of Systems: No chest pain, shortness of breath, or palpitations            Medications:  acetaminophen   Tablet .. 650 milliGRAM(s) Oral every 6 hours PRN  aspirin enteric coated 81 milliGRAM(s) Oral daily  atovaquone Suspension 1500 milliGRAM(s) Oral daily  Calcium Citrate + Vit D 315mg/200 IU 2 Tablet(s) 2 Tablet(s) Oral <User Schedule>  cefepime   IVPB 2000 milliGRAM(s) IV Intermittent every 12 hours  dextrose 40% Gel 15 Gram(s) Oral once PRN  dextrose 5%. 1000 milliLiter(s) IV Continuous <Continuous>  dextrose 50% Injectable 12.5 Gram(s) IV Push once  dextrose 50% Injectable 25 Gram(s) IV Push once  dextrose 50% Injectable 25 Gram(s) IV Push once  famotidine    Tablet 20 milliGRAM(s) Oral daily  glucagon  Injectable 1 milliGRAM(s) IntraMuscular once PRN  insulin lispro (HumaLOG) corrective regimen sliding scale   SubCutaneous three times a day before meals  insulin lispro (HumaLOG) corrective regimen sliding scale   SubCutaneous at bedtime  lactated ringers. 1000 milliLiter(s) IV Continuous <Continuous>  metroNIDAZOLE  IVPB 500 milliGRAM(s) IV Intermittent every 8 hours  multivitamin 1 Tablet(s) Oral daily  pravastatin 20 milliGRAM(s) Oral at bedtime  predniSONE   Tablet 3 milliGRAM(s) Oral <User Schedule>  sodium bicarbonate 650 milliGRAM(s) Oral <User Schedule>  sodium chloride 0.9% lock flush 3 milliLiter(s) IV Push every 8 hours  sodium chloride 0.9%. 1000 milliLiter(s) IV Continuous <Continuous>  tacrolimus 0.5 milliGRAM(s) Oral <User Schedule>  vancomycin    Solution 125 milliGRAM(s) Oral every 6 hours  voriconazole 200 milliGRAM(s) Oral every 12 hours      PAST MEDICAL & SURGICAL HISTORY:  HLD (hyperlipidemia)  Former smoker  DVT of upper extremity (deep vein thrombosis)  Hepatitis C virus  GIB (gastrointestinal bleeding)  Ventricular fibrillation: s/p AICD  PAF (paroxysmal atrial fibrillation): on xarelto  Non-Ischemic Cardiomyopathy  SVT (Supraventricular Tachycardia)  HTN  CHF (Congestive Heart Failure)  S/P right heart catheterization: biopsy multiple  H/O heart transplant: 2/2018  Status post left hip replacement  History of Prior Ablation Treatment: for afib  AICD (Automatic Cardioverter/Defibrillator) Present: [employer] with 1 [employer] lead4/1/09- explanted and replaced with Medtronic 2 leads on 9/2/09      Vitals:  T(F): 98.7 (09-09), Max: 98.7 (09-09)  HR: 120 (09-09) (110 - 120)  BP: 113/79 (09-09) (95/60 - 113/79)  RR: 18 (09-09)  SpO2: 93% (09-09)  I&O's Summary    07 Sep 2018 07:01  -  08 Sep 2018 07:00  --------------------------------------------------------  IN: 890 mL / OUT: 450 mL / NET: 440 mL    08 Sep 2018 07:01  -  09 Sep 2018 06:51  --------------------------------------------------------  IN: 870 mL / OUT: 302 mL / NET: 568 mL        Physical Exam:  Appearance: No Acute Distress  HEENT: JVP 6 cm H2O, no HJR  Cardiovascular: RRR, Normal S1 S2, No murmurs/rubs/gallops  Respiratory: Clear to auscultation bilaterally  Gastrointestinal: Soft, Non-tender, non-distended	  Skin: no skin lesions  Neurologic: Non-focal  Extremities: No LE edema, warm and well perfused,. right wrist edema improved compared to prior exam  Psychiatry: A & O x 3, Mood & affect appropriate                          7.2    6.8   )-----------( 334      ( 08 Sep 2018 09:13 )             23.2     09-08    139  |  107  |  38<H>  ----------------------------<  92  4.3   |  16<L>  |  1.36<H>    Ca    8.8      08 Sep 2018 06:38  Mg     1.6     09-08    Tacrolimus level - pending                New ECG(s): Personally reviewed    Echo:    Stress Testing:     Cath:    Imaging:    Interpretation of Telemetry: Patient seen and examined at bedside.    Overnight Events: 1 semiformed BM. Decreased appetite. No other complaints.    Review Of Systems: No chest pain, shortness of breath, or palpitations            Medications:  acetaminophen   Tablet .. 650 milliGRAM(s) Oral every 6 hours PRN  aspirin enteric coated 81 milliGRAM(s) Oral daily  atovaquone Suspension 1500 milliGRAM(s) Oral daily  Calcium Citrate + Vit D 315mg/200 IU 2 Tablet(s) 2 Tablet(s) Oral <User Schedule>  cefepime   IVPB 2000 milliGRAM(s) IV Intermittent every 12 hours  dextrose 40% Gel 15 Gram(s) Oral once PRN  dextrose 5%. 1000 milliLiter(s) IV Continuous <Continuous>  dextrose 50% Injectable 12.5 Gram(s) IV Push once  dextrose 50% Injectable 25 Gram(s) IV Push once  dextrose 50% Injectable 25 Gram(s) IV Push once  famotidine    Tablet 20 milliGRAM(s) Oral daily  glucagon  Injectable 1 milliGRAM(s) IntraMuscular once PRN  insulin lispro (HumaLOG) corrective regimen sliding scale   SubCutaneous three times a day before meals  insulin lispro (HumaLOG) corrective regimen sliding scale   SubCutaneous at bedtime  lactated ringers. 1000 milliLiter(s) IV Continuous <Continuous>  metroNIDAZOLE  IVPB 500 milliGRAM(s) IV Intermittent every 8 hours  multivitamin 1 Tablet(s) Oral daily  pravastatin 20 milliGRAM(s) Oral at bedtime  predniSONE   Tablet 3 milliGRAM(s) Oral <User Schedule>  sodium bicarbonate 650 milliGRAM(s) Oral <User Schedule>  sodium chloride 0.9% lock flush 3 milliLiter(s) IV Push every 8 hours  sodium chloride 0.9%. 1000 milliLiter(s) IV Continuous <Continuous>  tacrolimus 0.5 milliGRAM(s) Oral <User Schedule>  vancomycin    Solution 125 milliGRAM(s) Oral every 6 hours  voriconazole 200 milliGRAM(s) Oral every 12 hours      PAST MEDICAL & SURGICAL HISTORY:  HLD (hyperlipidemia)  Former smoker  DVT of upper extremity (deep vein thrombosis)  Hepatitis C virus  GIB (gastrointestinal bleeding)  Ventricular fibrillation: s/p AICD  PAF (paroxysmal atrial fibrillation): on xarelto  Non-Ischemic Cardiomyopathy  SVT (Supraventricular Tachycardia)  HTN  CHF (Congestive Heart Failure)  S/P right heart catheterization: biopsy multiple  H/O heart transplant: 2/2018  Status post left hip replacement  History of Prior Ablation Treatment: for afib  AICD (Automatic Cardioverter/Defibrillator) Present: [employer] with 1 [employer] lead4/1/09- explanted and replaced with Medtronic 2 leads on 9/2/09      Vitals:  T(F): 98.7 (09-09), Max: 98.7 (09-09)  HR: 120 (09-09) (110 - 120)  BP: 113/79 (09-09) (95/60 - 113/79)  RR: 18 (09-09)  SpO2: 93% (09-09)  I&O's Summary    07 Sep 2018 07:01  -  08 Sep 2018 07:00  --------------------------------------------------------  IN: 890 mL / OUT: 450 mL / NET: 440 mL    08 Sep 2018 07:01  -  09 Sep 2018 06:51  --------------------------------------------------------  IN: 870 mL / OUT: 302 mL / NET: 568 mL        Physical Exam:  Appearance: No Acute Distress  HEENT: JVP 6 cm H2O, no HJR  Cardiovascular: RRR, Normal S1 S2, No murmurs/rubs/gallops  Respiratory: Clear to auscultation bilaterally  Gastrointestinal: Soft, Non-tender, non-distended	  Skin: no skin lesions  Neurologic: Non-focal  Extremities: No LE edema, warm and well perfused,. right wrist edema improved compared to prior exam  Psychiatry: A & O x 3, Mood & affect appropriate                          7.2    6.8   )-----------( 334      ( 08 Sep 2018 09:13 )             23.2     09-08    139  |  107  |  38<H>  ----------------------------<  92  4.3   |  16<L>  |  1.36<H>    Ca    8.8      08 Sep 2018 06:38  Mg     1.6     09-08    Tacrolimus level - 11.8    Interpretation of Telemetry:  -120s

## 2018-09-09 NOTE — PROGRESS NOTE ADULT - PROBLEM SELECTOR PLAN 1
- Follow up results of CMV, Bcx, c.diff and GI PCR  - ID following (currently on Vancomycin and Cefepime)  - CT scan reviewed - underwent repeat biopsy of RUL Mass - ID following (currently on Vancomycin and Cefepime)  - CT scan reviewed - underwent repeat biopsy of RUL Mass

## 2018-09-09 NOTE — CONSULT NOTE ADULT - ASSESSMENT
68 year old man w/ PMHx NICM HFrEF s/p heart transplant on 2/23/18 c/b post op graft dysfunction s/p plasmapheresis and IVIG x2 as well as rituximab who presented for RHC and biopsy. Also noted to have chronic diarrhea with concern for infection. Now s/p FNA lung nodule. Heme consulted for anemia.    1) Anemia- appears to be chronically low based on prior labs with baseline 7-8  - no overt bleeding, FOBT negative  - likely multifactorial: anemia of chronic disease +/- current infection +/- myelosuppression from current meds (immunosuppressants)  - would begin anemia workup with iron studies (ferritin, total iron, TIBC, transferrin saturation), vit B12, folate, hemolysis labs (LDH, haptoglobin, bili). Check retic count as well   - less likely bone marrow disorder given other lines wnl   - no need for bone marrow biopsy at the moment, will f/u initial workup and review peripheral smear  - transfuse to keep Hg>7    Discussed with attending,  Erwin Gutierrez, PGY-4  Hematology-Oncology Fellow  [phone] ([place]) [phone] ([facility])

## 2018-09-09 NOTE — PROGRESS NOTE ADULT - ATTENDING COMMENTS
Had 1 liquidy BM.  Still on IV Flagyl and po vanco.  Appetite poor.  No more fevers.  Lung aspirate results pending.  Scr stable. Will monitor.  Tacro 11.8. Reduce tacro to 0.5 mg qam and 0.25 mg suspension qpm.  Hgb 6.5. Check stool Guaiac. Repeat CBC. If repeat Hgb<7.0, transfuse PRBC.  Check LDH, bili, haptoglobin, retic ct.  Hem consult ([place]) re: need for BM biopsy.

## 2018-09-09 NOTE — CONSULT NOTE ADULT - ATTENDING COMMENTS
Patient with what is likely anemia of chronic disease exacerbated by infection causing further BM suppression.  Will complete anemia workup and review PBS.  WBC and Plts in normal range so it is less likely that this is a BM disorder.  Will follow up.

## 2018-09-09 NOTE — PROGRESS NOTE ADULT - ASSESSMENT
69 y/o M with history of NICM now s/p heart transplantation on 2/23/18. 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 He has received therapy with IVIG, plamsapheresis, and rituximab. RHC 9/5 shows low normal filing pressures and high normal output. His presentation with low grade fevers, malaise, and persistent diarrhea concerning for recurrent infection, despite treatment for presumed fungal pneumonia and c.diff. Last CMV negative on 8/21. Enlargement of RUL opacity noted on chest CT compared with that from 8/27, LLL opacity stable. Mild rectal wall thickening noted, ongoing diarrhea. 67 y/o M with history of NICM now s/p heart transplantation on 2/23/18. 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 He has received therapy with IVIG, plamsapheresis, and rituximab. RHC 9/5 shows low normal filing pressures and high normal output. His presentation with low grade fevers, malaise, and persistent diarrhea concerning for recurrent infection, despite treatment for presumed fungal pneumonia and c.diff. Last CMV negative on 8/21. Enlargement of RUL opacity noted on chest CT compared with that from 8/27, LLL opacity stable. Mild rectal wall thickening noted, ongoing diarrhea but improving. Now with decreased H/H.

## 2018-09-10 LAB
ANION GAP SERPL CALC-SCNC: 15 MMOL/L — SIGNIFICANT CHANGE UP (ref 5–17)
BUN SERPL-MCNC: 37 MG/DL — HIGH (ref 7–23)
CALCIUM SERPL-MCNC: 9.1 MG/DL — SIGNIFICANT CHANGE UP (ref 8.4–10.5)
CHLORIDE SERPL-SCNC: 107 MMOL/L — SIGNIFICANT CHANGE UP (ref 96–108)
CO2 SERPL-SCNC: 19 MMOL/L — LOW (ref 22–31)
CREAT SERPL-MCNC: 1.22 MG/DL — SIGNIFICANT CHANGE UP (ref 0.5–1.3)
CULTURE RESULTS: SIGNIFICANT CHANGE UP
CULTURE RESULTS: SIGNIFICANT CHANGE UP
FERRITIN SERPL-MCNC: 1017 NG/ML — HIGH (ref 30–400)
FOLATE SERPL-MCNC: >20 NG/ML — SIGNIFICANT CHANGE UP
FUNGITELL: <31 PG/ML — SIGNIFICANT CHANGE UP (ref 0–59)
GALACTOMANNAN AG SERPL-ACNC: <0.5 INDEX — SIGNIFICANT CHANGE UP
GLUCOSE BLDC GLUCOMTR-MCNC: 91 MG/DL — SIGNIFICANT CHANGE UP (ref 70–99)
GLUCOSE BLDC GLUCOMTR-MCNC: 92 MG/DL — SIGNIFICANT CHANGE UP (ref 70–99)
GLUCOSE BLDC GLUCOMTR-MCNC: 96 MG/DL — SIGNIFICANT CHANGE UP (ref 70–99)
GLUCOSE BLDC GLUCOMTR-MCNC: 97 MG/DL — SIGNIFICANT CHANGE UP (ref 70–99)
GLUCOSE SERPL-MCNC: 99 MG/DL — SIGNIFICANT CHANGE UP (ref 70–99)
HCT VFR BLD CALC: 28 % — LOW (ref 39–50)
HGB BLD-MCNC: 8.9 G/DL — LOW (ref 13–17)
MCHC RBC-ENTMCNC: 28.5 PG — SIGNIFICANT CHANGE UP (ref 27–34)
MCHC RBC-ENTMCNC: 31.6 GM/DL — LOW (ref 32–36)
MCV RBC AUTO: 90.2 FL — SIGNIFICANT CHANGE UP (ref 80–100)
OB PNL STL: NEGATIVE — SIGNIFICANT CHANGE UP
PLATELET # BLD AUTO: 415 K/UL — HIGH (ref 150–400)
POTASSIUM SERPL-MCNC: 4.1 MMOL/L — SIGNIFICANT CHANGE UP (ref 3.5–5.3)
POTASSIUM SERPL-SCNC: 4.1 MMOL/L — SIGNIFICANT CHANGE UP (ref 3.5–5.3)
RBC # BLD: 3.11 M/UL — LOW (ref 4.2–5.8)
RBC # FLD: 20.7 % — HIGH (ref 10.3–14.5)
SODIUM SERPL-SCNC: 141 MMOL/L — SIGNIFICANT CHANGE UP (ref 135–145)
SPECIMEN SOURCE: SIGNIFICANT CHANGE UP
SPECIMEN SOURCE: SIGNIFICANT CHANGE UP
TACROLIMUS SERPL-MCNC: 11.5 NG/ML — SIGNIFICANT CHANGE UP
TRANSFERRIN SERPL-MCNC: 121 MG/DL — LOW (ref 200–360)
VIT B12 SERPL-MCNC: >2000 PG/ML — HIGH (ref 232–1245)
WBC # BLD: 5.9 K/UL — SIGNIFICANT CHANGE UP (ref 3.8–10.5)
WBC # FLD AUTO: 5.9 K/UL — SIGNIFICANT CHANGE UP (ref 3.8–10.5)

## 2018-09-10 PROCEDURE — 99231 SBSQ HOSP IP/OBS SF/LOW 25: CPT

## 2018-09-10 PROCEDURE — 99233 SBSQ HOSP IP/OBS HIGH 50: CPT | Mod: GC

## 2018-09-10 PROCEDURE — 99232 SBSQ HOSP IP/OBS MODERATE 35: CPT

## 2018-09-10 RX ADMIN — Medication 100 MILLIGRAM(S): at 05:46

## 2018-09-10 RX ADMIN — Medication 650 MILLIGRAM(S): at 07:45

## 2018-09-10 RX ADMIN — CEFEPIME 100 MILLIGRAM(S): 1 INJECTION, POWDER, FOR SOLUTION INTRAMUSCULAR; INTRAVENOUS at 18:30

## 2018-09-10 RX ADMIN — Medication 1 TABLET(S): at 12:26

## 2018-09-10 RX ADMIN — Medication 125 MILLIGRAM(S): at 05:46

## 2018-09-10 RX ADMIN — VORICONAZOLE 200 MILLIGRAM(S): 10 INJECTION, POWDER, LYOPHILIZED, FOR SOLUTION INTRAVENOUS at 05:47

## 2018-09-10 RX ADMIN — Medication 125 MILLIGRAM(S): at 12:26

## 2018-09-10 RX ADMIN — Medication 20 MILLIGRAM(S): at 22:05

## 2018-09-10 RX ADMIN — Medication 81 MILLIGRAM(S): at 12:26

## 2018-09-10 RX ADMIN — SODIUM CHLORIDE 3 MILLILITER(S): 9 INJECTION INTRAMUSCULAR; INTRAVENOUS; SUBCUTANEOUS at 21:47

## 2018-09-10 RX ADMIN — FAMOTIDINE 20 MILLIGRAM(S): 10 INJECTION INTRAVENOUS at 12:26

## 2018-09-10 RX ADMIN — CEFEPIME 100 MILLIGRAM(S): 1 INJECTION, POWDER, FOR SOLUTION INTRAMUSCULAR; INTRAVENOUS at 05:45

## 2018-09-10 RX ADMIN — VORICONAZOLE 200 MILLIGRAM(S): 10 INJECTION, POWDER, LYOPHILIZED, FOR SOLUTION INTRAVENOUS at 18:30

## 2018-09-10 RX ADMIN — Medication 100 MILLIGRAM(S): at 13:47

## 2018-09-10 RX ADMIN — TACROLIMUS 0.5 MILLIGRAM(S): 5 CAPSULE ORAL at 07:45

## 2018-09-10 RX ADMIN — TACROLIMUS 0.25 MILLIGRAM(S): 5 CAPSULE ORAL at 20:07

## 2018-09-10 RX ADMIN — SODIUM CHLORIDE 3 MILLILITER(S): 9 INJECTION INTRAMUSCULAR; INTRAVENOUS; SUBCUTANEOUS at 06:35

## 2018-09-10 RX ADMIN — Medication 125 MILLIGRAM(S): at 18:30

## 2018-09-10 RX ADMIN — ATOVAQUONE 1500 MILLIGRAM(S): 750 SUSPENSION ORAL at 12:26

## 2018-09-10 RX ADMIN — Medication 3 MILLIGRAM(S): at 07:45

## 2018-09-10 RX ADMIN — Medication 100 MILLIGRAM(S): at 21:57

## 2018-09-10 RX ADMIN — SODIUM CHLORIDE 3 MILLILITER(S): 9 INJECTION INTRAMUSCULAR; INTRAVENOUS; SUBCUTANEOUS at 13:46

## 2018-09-10 RX ADMIN — Medication 650 MILLIGRAM(S): at 20:07

## 2018-09-10 NOTE — PROGRESS NOTE ADULT - ASSESSMENT
67 yo M s/p heart transplant complicated by rejection with rounds of plasmaphoresis , IVIG, rituximab x2, known colonizer with  Pseudomonas last treated for PNA in June 2018  and again in August 14-30 with cefepime and voriconazole as well as c.diff with PO vanco now presents with decreased PO intake, 4 watery BM daily and a productive cough. Pt had CT guided R lung mass bx on 8/17 which showed acute inflammation but was negative bacterial or fungal etiology. Concerned now for cdiff colitis vs CMV colitis.     last admission labs:   Fungitell negative  Galactomannan negative  Crypto Ag negative  Quant indeterminate, however lung biopsy w negative AFB stain  Pathology report from 8/17 R lung bx showing inflammation w negative AFB and GMS stain  CT guided biopsy cultures are NGTD- only grew a corynebacterium that is likely not a pathogen  Hep C neg (8/22)    Suggest:  Continue PO Vancomycin 125mg q6hrs and flagyl 500 mg q 8 hours - now with pasty stools  Stool for GI PCR- negative  CMV PCR- negative    Spoke with the micro. lab: pleural biopsy tissue with gram positive beaded rods- maldi-tof unable to identify. The lab is awaiting further growht of the organism for repeat testing  ? Actino/nocardia  He is responding again to C.diff treatment and Vanco/Cefepime will wait for further identification of the organism prior to switching therapy    Gary Lujan MD  [phone]  After 5pm/weekends [phone]    F/U blood cultures - NGTD  Febrile this morning - would continue cefepime can decrease to 2 grams IV q 12 hours  Monitor CrCl while on abx  Continue Mepron 1500mg daily  Continue Voriconazole 200mg q12hrs - F/U voriconazole level  Monitor Tacrolimus level  RVP neg  Pt s/p IR biopsy  specimen sent for culture, fungal, AFB, and nocardia and pathology  F/U fungitell and galactomannan

## 2018-09-10 NOTE — PROGRESS NOTE ADULT - ASSESSMENT
Impression:  # Diarrhea: Suspect C. Diff vs Immunosuppression-induced and bacterial infection. CMV PCR negative. Rectal wall thickening seen on CT. Improved from admission, but continues to have loose stools.   # Anemia: Suspect multifactorial anemia of chronic disease +/- BM suppression from medications. No evidence of overt GI Bleed. Hemoglobin of 6.5, likely lab variation - given improvement to 8.2 / 8.9 with 1U pRBC    Recommendations:  - Check Stool PCR  - Continue PO vancomycin for C. Diff treatment  - Supportive care per primary team  - No plans for endoscopic procedures at this time    Mitzi King MD  Gastroenterology Fellow  [phone]  Please page on call fellow on weekends and after 5pm on weekdays Impression:  # Diarrhea: Suspect C. Diff vs Immunosuppression-induced and bacterial infection. CMV PCR negative. Rectal wall thickening seen on CT. Improved from admission, but continues to have loose stools. Differential also includes microscopic colitis, medication induced colitis, neoplastic process less likely given negative colonoscopy prior to heart transplant last year. TSH is normal in 6/2018  # Anemia: Suspect multifactorial anemia of chronic disease +/- BM suppression from medications. No evidence of overt GI Bleed. Hemoglobin of 6.5, likely lab variation - given improvement to 8.2 / 8.9 with 1U pRBC    Recommendations:  - Check Stool GI PCR  - Continue PO vancomycin for C. Diff treatment  - Supportive care per primary team  - As the patient's diarrhea has now become chronic, if diarrhea continues, we will plan for flexible sigmoidoscopy for biopsies    Mitzi King MD  Gastroenterology Fellow  [phone]  Please page on call fellow on weekends and after 5pm on weekdays

## 2018-09-10 NOTE — PROGRESS NOTE ADULT - ATTENDING COMMENTS
Had 1 liquidy BM again this AM.  Still on IV Flagyl and po vanco.  Appetite remains poor.  Was tx 2U PRBC yesterday. There does not appear to be significant hemolysis.  No more fevers.  Lung aspirate results pending.  Tacro 11.5. Reduced tacro to 0.5 mg qam and 0.25 mg suspension qpm. Continue current dose.  Hem consult noted.

## 2018-09-10 NOTE — PROGRESS NOTE ADULT - SUBJECTIVE AND OBJECTIVE BOX
Chief Complaint:  Patient is a 68y old  Male who presents with a chief complaint of Fever (09 Sep 2018 06:50)    Interval Events:   No acute overnight events. Reports one episode of loose/watery diarrhea yesterday    Allergies:  No Known Allergies    Hospital Medications:  acetaminophen   Tablet .. 650 milliGRAM(s) Oral every 6 hours PRN  aspirin enteric coated 81 milliGRAM(s) Oral daily  atovaquone Suspension 1500 milliGRAM(s) Oral daily  Calcium Citrate + Vit D 315mg/200 IU 2 Tablet(s) 2 Tablet(s) Oral <User Schedule>  cefepime   IVPB 2000 milliGRAM(s) IV Intermittent every 12 hours  dextrose 40% Gel 15 Gram(s) Oral once PRN  dextrose 5%. 1000 milliLiter(s) IV Continuous <Continuous>  dextrose 50% Injectable 12.5 Gram(s) IV Push once  dextrose 50% Injectable 25 Gram(s) IV Push once  dextrose 50% Injectable 25 Gram(s) IV Push once  famotidine    Tablet 20 milliGRAM(s) Oral daily  glucagon  Injectable 1 milliGRAM(s) IntraMuscular once PRN  HYDROcodone/homatropine Syrup 5 milliLiter(s) Oral every 8 hours PRN  insulin lispro (HumaLOG) corrective regimen sliding scale   SubCutaneous three times a day before meals  insulin lispro (HumaLOG) corrective regimen sliding scale   SubCutaneous at bedtime  lactated ringers. 1000 milliLiter(s) IV Continuous <Continuous>  metroNIDAZOLE  IVPB 500 milliGRAM(s) IV Intermittent every 8 hours  multivitamin 1 Tablet(s) Oral daily  pravastatin 20 milliGRAM(s) Oral at bedtime  predniSONE   Tablet 3 milliGRAM(s) Oral <User Schedule>  sodium bicarbonate 650 milliGRAM(s) Oral <User Schedule>  sodium chloride 0.9% lock flush 3 milliLiter(s) IV Push every 8 hours  sodium chloride 0.9%. 1000 milliLiter(s) IV Continuous <Continuous>  tacrolimus 0.5 milliGRAM(s) Oral <User Schedule>  tacrolimus    0.5 mG/mL Suspension 0.25 milliGRAM(s) Oral <User Schedule>  vancomycin    Solution 125 milliGRAM(s) Oral every 6 hours  voriconazole 200 milliGRAM(s) Oral every 12 hours    PMHX/PSHX:  HLD (hyperlipidemia)  Former smoker  DVT of upper extremity (deep vein thrombosis)  Hepatitis C virus  GIB (gastrointestinal bleeding)  H/O prior ablation treatment  Ventricular fibrillation  PAF (paroxysmal atrial fibrillation)  Non-Ischemic Cardiomyopathy  SVT (Supraventricular Tachycardia)  HTN  CHF (Congestive Heart Failure)  S/P right heart catheterization  H/O heart transplant  LVAD (left ventricular assist device) present  Status post left hip replacement  Hypertension  Hypertension  History of Prior Ablation Treatment  AICD (Automatic Cardioverter/Defibrillator) Present    Family history:  No pertinent family history in first degree relatives    ROS:   General:  No fevers or chills  ENT:  No sore throat or dysphagia  CV:  No pain or palpitations  Resp:  No dyspnea, cough or  wheezing  GI:  + diarrhea, No pain, No nausea, No vomiting, No rectal bleeding, No tarry stools,  Skin:  No rash or edema    PHYSICAL EXAM:   Vital Signs:  Vital Signs Last 24 Hrs  T(C): 36.8 (10 Sep 2018 07:53), Max: 36.9 (09 Sep 2018 13:48)  T(F): 98.2 (10 Sep 2018 07:53), Max: 98.4 (09 Sep 2018 13:48)  HR: 126 (10 Sep 2018 07:53) (112 - 126)  BP: 126/82 (10 Sep 2018 07:53) (106/77 - 126/82)  BP(mean): --  RR: 18 (10 Sep 2018 07:53) (18 - 18)  SpO2: 92% (10 Sep 2018 07:53) (92% - 95%)  Daily     Daily Weight in k (10 Sep 2018 07:53)    GENERAL:  NAD, Appears stated age  HEENT:  NC/AT,  conjunctivae clear and pink, sclera -anicteric  CHEST:  CTA B/L, Normal effort  HEART:  Tachycardic, Regular rhythm, S1/S2, No murmurs  ABDOMEN:  Soft, non-tender, non-distended, normoactive bowel sounds,  no masses ,no hepatomegaly  EXTEREMITIES:  No cyanosis or Edema  SKIN:  Warm & Dry. No rash or erythema  NEURO:  Alert, oriented    LABS:                        8.9    5.9   )-----------( 415      ( 10 Sep 2018 07:17 )             28.0     Mean Cell Volume: 90.2 fl (09-10-18 @ 07:17)    09-10    141  |  107  |  37<H>  ----------------------------<  99  4.1   |  19<L>  |  1.22    Ca    9.1      10 Sep 2018 07:17  Mg     1.9         TPro  x   /  Alb  x   /  TBili  0.6  /  DBili  x   /  AST  x   /  ALT  x   /  AlkPhos  x       Hemoglobin: 8.9 g/dL (09-10-18 @ 07:17)  Hemoglobin: 8.2 g/dL (18 @ 17:49)  Hemoglobin: 6.5 g/dL (18 @ 07:38) Given 1U  Hemoglobin: 7.2 g/dL (18 @ 09:13)  Hemoglobin: See Note (18 @ 06:35) Chief Complaint:  Patient is a 68y old  Male who presents with a chief complaint of Fever (09 Sep 2018 06:50)    Interval Events:   No acute overnight events. Reports one episode of loose/watery diarrhea yesterday. Today so far, no bowel movements. He denies any abdominal pain. On further questions, he reports having had diarrhea for the last 3 months even before being diagnosed with C diff. He reports no improvement on PO vancomycin recently.    Allergies:  No Known Allergies    Hospital Medications:  acetaminophen   Tablet .. 650 milliGRAM(s) Oral every 6 hours PRN  aspirin enteric coated 81 milliGRAM(s) Oral daily  atovaquone Suspension 1500 milliGRAM(s) Oral daily  Calcium Citrate + Vit D 315mg/200 IU 2 Tablet(s) 2 Tablet(s) Oral <User Schedule>  cefepime   IVPB 2000 milliGRAM(s) IV Intermittent every 12 hours  dextrose 40% Gel 15 Gram(s) Oral once PRN  dextrose 5%. 1000 milliLiter(s) IV Continuous <Continuous>  dextrose 50% Injectable 12.5 Gram(s) IV Push once  dextrose 50% Injectable 25 Gram(s) IV Push once  dextrose 50% Injectable 25 Gram(s) IV Push once  famotidine    Tablet 20 milliGRAM(s) Oral daily  glucagon  Injectable 1 milliGRAM(s) IntraMuscular once PRN  HYDROcodone/homatropine Syrup 5 milliLiter(s) Oral every 8 hours PRN  insulin lispro (HumaLOG) corrective regimen sliding scale   SubCutaneous three times a day before meals  insulin lispro (HumaLOG) corrective regimen sliding scale   SubCutaneous at bedtime  lactated ringers. 1000 milliLiter(s) IV Continuous <Continuous>  metroNIDAZOLE  IVPB 500 milliGRAM(s) IV Intermittent every 8 hours  multivitamin 1 Tablet(s) Oral daily  pravastatin 20 milliGRAM(s) Oral at bedtime  predniSONE   Tablet 3 milliGRAM(s) Oral <User Schedule>  sodium bicarbonate 650 milliGRAM(s) Oral <User Schedule>  sodium chloride 0.9% lock flush 3 milliLiter(s) IV Push every 8 hours  sodium chloride 0.9%. 1000 milliLiter(s) IV Continuous <Continuous>  tacrolimus 0.5 milliGRAM(s) Oral <User Schedule>  tacrolimus    0.5 mG/mL Suspension 0.25 milliGRAM(s) Oral <User Schedule>  vancomycin    Solution 125 milliGRAM(s) Oral every 6 hours  voriconazole 200 milliGRAM(s) Oral every 12 hours    PMHX/PSHX:  HLD (hyperlipidemia)  Former smoker  DVT of upper extremity (deep vein thrombosis)  Hepatitis C virus  GIB (gastrointestinal bleeding)  H/O prior ablation treatment  Ventricular fibrillation  PAF (paroxysmal atrial fibrillation)  Non-Ischemic Cardiomyopathy  SVT (Supraventricular Tachycardia)  HTN  CHF (Congestive Heart Failure)  S/P right heart catheterization  H/O heart transplant  LVAD (left ventricular assist device) present  Status post left hip replacement  Hypertension  Hypertension  History of Prior Ablation Treatment  AICD (Automatic Cardioverter/Defibrillator) Present    Family history:  No pertinent family history in first degree relatives    ROS:   General:  No fevers or chills  ENT:  No sore throat or dysphagia  CV:  No pain or palpitations  Resp:  No dyspnea, cough or  wheezing  GI:  + diarrhea, No pain, No nausea, No vomiting, No rectal bleeding, No tarry stools,  Skin:  No rash or edema    PHYSICAL EXAM:   Vital Signs:  Vital Signs Last 24 Hrs  T(C): 36.8 (10 Sep 2018 07:53), Max: 36.9 (09 Sep 2018 13:48)  T(F): 98.2 (10 Sep 2018 07:53), Max: 98.4 (09 Sep 2018 13:48)  HR: 126 (10 Sep 2018 07:53) (112 - 126)  BP: 126/82 (10 Sep 2018 07:53) (106/77 - 126/82)  BP(mean): --  RR: 18 (10 Sep 2018 07:53) (18 - 18)  SpO2: 92% (10 Sep 2018 07:53) (92% - 95%)  Daily     Daily Weight in k (10 Sep 2018 07:53)    GENERAL:  NAD, Appears stated age  HEENT:  NC/AT,  conjunctivae clear and pink, sclera -anicteric  CHEST:  CTA B/L, Normal effort  HEART:  Tachycardic, Regular rhythm, S1/S2, No murmurs  ABDOMEN:  Soft, non-tender, non-distended, normoactive bowel sounds,  no masses ,no hepatomegaly  EXTEREMITIES:  No cyanosis or Edema  SKIN:  Warm & Dry. No rash or erythema  NEURO:  Alert, oriented    LABS:                        8.9    5.9   )-----------( 415      ( 10 Sep 2018 07:17 )             28.0     Mean Cell Volume: 90.2 fl (09-10-18 @ 07:17)    09-10    141  |  107  |  37<H>  ----------------------------<  99  4.1   |  19<L>  |  1.22    Ca    9.1      10 Sep 2018 07:17  Mg     1.9         TPro  x   /  Alb  x   /  TBili  0.6  /  DBili  x   /  AST  x   /  ALT  x   /  AlkPhos  x       Hemoglobin: 8.9 g/dL (09-10-18 @ 07:17)  Hemoglobin: 8.2 g/dL (18 @ 17:49)  Hemoglobin: 6.5 g/dL (18 @ 07:38) Given 1U  Hemoglobin: 7.2 g/dL (18 @ 09:13)  Hemoglobin: See Note (18 @ 06:35)

## 2018-09-10 NOTE — PROGRESS NOTE ADULT - ASSESSMENT
69 y/o M with history of NICM now s/p heart transplantation on 2/23/18. 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 He has received therapy with IVIG, plamsapheresis, and rituximab. RHC 9/5 shows low normal filing pressures and high normal output. His presentation with low grade fevers, malaise, and persistent diarrhea concerning for recurrent infection, despite treatment for presumed fungal pneumonia and c.diff. Last CMV negative on 8/21. Enlargement of RUL opacity noted on chest CT compared with that from 8/27, LLL opacity stable. Mild rectal wall thickening noted, ongoing diarrhea but improving. Now with decreased H/H.

## 2018-09-10 NOTE — PROGRESS NOTE ADULT - SUBJECTIVE AND OBJECTIVE BOX
watery BM yesterday. None noted this am.     MEDICATIONS:  aspirin enteric coated 81 milliGRAM(s) Oral daily    atovaquone Suspension 1500 milliGRAM(s) Oral daily  cefepime   IVPB 2000 milliGRAM(s) IV Intermittent every 12 hours  metroNIDAZOLE  IVPB 500 milliGRAM(s) IV Intermittent every 8 hours  vancomycin    Solution 125 milliGRAM(s) Oral every 6 hours  voriconazole 200 milliGRAM(s) Oral every 12 hours    HYDROcodone/homatropine Syrup 5 milliLiter(s) Oral every 8 hours PRN    acetaminophen   Tablet .. 650 milliGRAM(s) Oral every 6 hours PRN    famotidine    Tablet 20 milliGRAM(s) Oral daily    dextrose 40% Gel 15 Gram(s) Oral once PRN  dextrose 50% Injectable 12.5 Gram(s) IV Push once  dextrose 50% Injectable 25 Gram(s) IV Push once  dextrose 50% Injectable 25 Gram(s) IV Push once  glucagon  Injectable 1 milliGRAM(s) IntraMuscular once PRN  insulin lispro (HumaLOG) corrective regimen sliding scale   SubCutaneous three times a day before meals  insulin lispro (HumaLOG) corrective regimen sliding scale   SubCutaneous at bedtime  pravastatin 20 milliGRAM(s) Oral at bedtime  predniSONE   Tablet 3 milliGRAM(s) Oral <User Schedule>    dextrose 5%. 1000 milliLiter(s) IV Continuous <Continuous>  lactated ringers. 1000 milliLiter(s) IV Continuous <Continuous>  multivitamin 1 Tablet(s) Oral daily  sodium bicarbonate 650 milliGRAM(s) Oral <User Schedule>  sodium chloride 0.9% lock flush 3 milliLiter(s) IV Push every 8 hours  sodium chloride 0.9%. 1000 milliLiter(s) IV Continuous <Continuous>  tacrolimus 0.5 milliGRAM(s) Oral <User Schedule>  tacrolimus    0.5 mG/mL Suspension 0.25 milliGRAM(s) Oral <User Schedule>      PHYSICAL EXAM:  T(C): 36.8 (09-10-18 @ 07:53), Max: 36.9 (09-09-18 @ 13:48)  HR: 126 (09-10-18 @ 07:53) (112 - 126)  BP: 126/82 (09-10-18 @ 07:53) (106/77 - 126/82)  RR: 18 (09-10-18 @ 07:53) (18 - 18)  SpO2: 92% (09-10-18 @ 07:53) (92% - 95%)  Wt(kg): --  I&O's Summary    09 Sep 2018 07:01  -  10 Sep 2018 07:00  --------------------------------------------------------  IN: 0 mL / OUT: 300 mL / NET: -300 mL    10 Sep 2018 07:01  -  10 Sep 2018 12:09  --------------------------------------------------------  IN: 0 mL / OUT: 300 mL / NET: -300 mL        Physical Exam:  Appearance: No Acute Distress  HEENT: JVP 6 cm H2O, no HJR  Cardiovascular: RRR, Normal S1 S2, No murmurs/rubs/gallops  Respiratory: Clear to auscultation bilaterally  Gastrointestinal: Soft, Non-tender, non-distended	  Skin: no skin lesions  Neurologic: Non-focal  Extremities: No LE edema, warm and well perfused,. right wrist edema improved compared to prior exam  Psychiatry: A & O x 3, Mood & affect appropriate      LABS:	 	    CBC Full  -  ( 10 Sep 2018 07:17 )  WBC Count : 5.9 K/uL  Hemoglobin : 8.9 g/dL  Hematocrit : 28.0 %  Platelet Count - Automated : 415 K/uL  Mean Cell Volume : 90.2 fl  Mean Cell Hemoglobin : 28.5 pg  Mean Cell Hemoglobin Concentration : 31.6 gm/dL  Auto Neutrophil # : x  Auto Lymphocyte # : x  Auto Monocyte # : x  Auto Eosinophil # : x  Auto Basophil # : x  Auto Neutrophil % : x  Auto Lymphocyte % : x  Auto Monocyte % : x  Auto Eosinophil % : x  Auto Basophil % : x    09-10    141  |  107  |  37<H>  ----------------------------<  99  4.1   |  19<L>  |  1.22  09-09    138  |  106  |  44<H>  ----------------------------<  103<H>  3.9   |  18<L>  |  1.37<H>    Ca    9.1      10 Sep 2018 07:17  Ca    8.6      09 Sep 2018 07:38  Mg     1.9     09-09    TPro  x   /  Alb  x   /  TBili  0.6  /  DBili  x   /  AST  x   /  ALT  x   /  AlkPhos  x   09-09      proBNP:   Lipid Profile:   HgA1c:   TSH:       CARDIAC MARKERS:

## 2018-09-10 NOTE — PROGRESS NOTE ADULT - PROBLEM SELECTOR PLAN 2
cont Tacrolimus dose  0.5 mg PO in AM/ 0.25 mg PO suspension in PM.  (goal 8-10, currently 11.5).  CellCept on hold due to infection.  Prednisone 3 mg po daily.    Antimicrobial prophylaxis:  Intermediate risk CMV - Valcyte initially held due to leukopenia. He is 6 months post-transplant so will monitor CMV PCR. .  Intermediate risk Toxo - continue Mepron 1500 mg po daily  PCP prophy - covered by Mepron.  Hep C+ - he completed a course of Mayvret with undetectable Hep C virus now. Will need follow-up increased risk donor testing at 12 months post-OHT.    Other prophylaxis:  Pepcid 20 mg po daily  ECASA 81 mg po daily  Citracal + D 2 tabs po BID  Pravachol 20 mg po QHS.  Please supplement with IV mag PRN for goal of 2-2.5.

## 2018-09-10 NOTE — PROGRESS NOTE ADULT - SUBJECTIVE AND OBJECTIVE BOX
Subjective   Hello " have not coughed since the cough medicine"  VITAL SIGNS    Telemetry:  NSR     Vital Signs Last 24 Hrs  T(C): 37.3 (09-10-18 @ 12:45), Max: 37.3 (09-10-18 @ 12:45)  T(F): 99.1 (09-10-18 @ 12:45), Max: 99.1 (09-10-18 @ 12:45)  HR: 120 (09-10-18 @ 12:45) (112 - 126)  BP: 115/79 (09-10-18 @ 12:45) (112/79 - 126/82)  RR: 18 (09-10-18 @ 12:45) (18 - 18)  SpO2: 99% (09-10-18 @ 12:45) (92% - 99%)            @ 07:01  -  09-10 @ 07:00  --------------------------------------------------------  IN: 0 mL / OUT: 300 mL / NET: -300 mL    09-10 @ 07:01  -  09-10 @ 13:55  --------------------------------------------------------  IN: 460 mL / OUT: 300 mL / NET: 160 mL    Daily Weight in k (10 Sep 2018 07:53)    POCT Blood Glucose.: 91 mg/dL (10 Sep 2018 11:19)  POCT Blood Glucose.: 97 mg/dL (10 Sep 2018 07:41)  POCT Blood Glucose.: 111 mg/dL (09 Sep 2018 22:10)  POCT Blood Glucose.: 93 mg/dL (09 Sep 2018 19:16)  PHYSICAL EXAM  Neurology: alert and oriented x 3, nonfocal, no gross deficits  CV :S1S2 RRR    Sternal Wound : healed sternum stable    Lungs: /L breath sounds  Abdomen: soft, nontender, nondistended, positive bowel sounds, last bowel movement 9/10 liquid   Voids       Extremities:  B/LlE warm well perfused+ DP      Physical Therapy Rec:   Home  x   Home w/ PT  [  ]  Rehab  [  ]    Discussed with Cardiothoracic Team at AM rounds.

## 2018-09-10 NOTE — PROGRESS NOTE ADULT - ATTENDING COMMENTS
Patient seen and examined. Agree with above. Continue PO vancomycin for now. If diarrhea recurs in the next 24-48 hours, we will consider flexible sigmoidoscopy to rule out microscopic colitis via biopsies. Check GI stool PCR to complete workup.

## 2018-09-10 NOTE — PROGRESS NOTE ADULT - ASSESSMENT
67 y/o male with PMH NICM HFrEF s/p HM2 LVAD 6/2017, who underwent heart transplant on 2/23/18 (CMV D-/R+ and Toxo D-/R+, Hep C+ heart) with post op graft dysfunction who underwent plasmapheresis and IVIG x2 as well as rituximab, with suspected fungal PNA diagnosed 6/2018  treated w voriconazole, recurrent PNA with new RUL infiltrate on CT scan 8/18, which  improved on broad spectrum antibiotics. S/p lung biopsy 8/18. C diff toxin positive -still  on oral vanco,.  Patient presents today for RHC and cardiac biopsy with c/o ongoing diarrhea (3-4 loose BM daily) and mildly productive cough, denies fevers, chills, abd pain, N/V. EKG demonstrated ST at 133bpm. Pt readmitted for further workup.  9/6 one formed BM this am; CMV study's pending, BC pending afebrile, albumin 500 cc for dehydration  potassium supplemented, transition to regular diet.   9/7 VVS; underwent IR procedure of Right lung mass FNA.  Tolerated procedure well.  Culture pending. Vanco IV D/C per ID. Continue with current medication regimen. Send Legionella urine culture and check voriconazole (T) in AM   Disposition: Home once medically cleared   9/8 + fever x1 101.7 as per ID cefepine decreased to 2g bid ivpb  9/9 VSS. 1 liquid/soft BM. Heme consulted for neutropenia possible bone marrow Bx. Deferring Bx at this time until anemia work up complete. Tacro 11.8. F/U HF recs  9/10 VSS 1loos liquid BM needs Bone marrow biopsy Tacro11.5  Hycodan for cough

## 2018-09-10 NOTE — PROGRESS NOTE ADULT - PROBLEM SELECTOR PLAN 1
- ID following (currently on Vancomycin and Cefepime)  - CT scan reviewed - underwent repeat biopsy of RUL Mass - awaiting results - GNR prelimb

## 2018-09-10 NOTE — PROGRESS NOTE ADULT - PROBLEM SELECTOR PLAN 4
- recheck CBC and transfuse for H<7  - FOBT and hemolysis labs  - heme consult for possible BM biopsy

## 2018-09-10 NOTE — PROGRESS NOTE ADULT - PROBLEM SELECTOR PLAN 2
blood cultures x2   ID following, continue on PO Vanco q6h and IV Flagyl Q8 for C.diff  GI consulted for possible colonoscopy

## 2018-09-10 NOTE — PROGRESS NOTE ADULT - SUBJECTIVE AND OBJECTIVE BOX
INFECTIOUS DISEASES FOLLOW UP-- Sujey Lujan  [phone]    This is a follow up note for this  68yMale with  History of heart transplant admitted with fevers and diarrhea      ROS:  CONSTITUTIONAL:  No fever, good appetite  CARDIOVASCULAR:  No chest pain or palpitations  RESPIRATORY:  No dyspnea  GASTROINTESTINAL:  No nausea, vomiting, diarrhea, or abdominal pain  GENITOURINARY:  No dysuria  NEUROLOGIC:  No headache,     Allergies    No Known Allergies    Intolerances        ANTIBIOTICS/RELEVANT:  antimicrobials  atovaquone Suspension 1500 milliGRAM(s) Oral daily  cefepime   IVPB 2000 milliGRAM(s) IV Intermittent every 12 hours  metroNIDAZOLE  IVPB 500 milliGRAM(s) IV Intermittent every 8 hours  vancomycin    Solution 125 milliGRAM(s) Oral every 6 hours  voriconazole 200 milliGRAM(s) Oral every 12 hours    immunologic:  tacrolimus 0.5 milliGRAM(s) Oral <User Schedule>  tacrolimus    0.5 mG/mL Suspension 0.25 milliGRAM(s) Oral <User Schedule>    OTHER:  acetaminophen   Tablet .. 650 milliGRAM(s) Oral every 6 hours PRN  aspirin enteric coated 81 milliGRAM(s) Oral daily  Calcium Citrate + Vit D 315mg/200 IU 2 Tablet(s) 2 Tablet(s) Oral <User Schedule>  dextrose 40% Gel 15 Gram(s) Oral once PRN  dextrose 5%. 1000 milliLiter(s) IV Continuous <Continuous>  dextrose 50% Injectable 12.5 Gram(s) IV Push once  dextrose 50% Injectable 25 Gram(s) IV Push once  dextrose 50% Injectable 25 Gram(s) IV Push once  famotidine    Tablet 20 milliGRAM(s) Oral daily  glucagon  Injectable 1 milliGRAM(s) IntraMuscular once PRN  HYDROcodone/homatropine Syrup 5 milliLiter(s) Oral every 8 hours PRN  insulin lispro (HumaLOG) corrective regimen sliding scale   SubCutaneous three times a day before meals  insulin lispro (HumaLOG) corrective regimen sliding scale   SubCutaneous at bedtime  lactated ringers. 1000 milliLiter(s) IV Continuous <Continuous>  multivitamin 1 Tablet(s) Oral daily  pravastatin 20 milliGRAM(s) Oral at bedtime  predniSONE   Tablet 3 milliGRAM(s) Oral <User Schedule>  sodium bicarbonate 650 milliGRAM(s) Oral <User Schedule>  sodium chloride 0.9% lock flush 3 milliLiter(s) IV Push every 8 hours  sodium chloride 0.9%. 1000 milliLiter(s) IV Continuous <Continuous>      Objective:  Vital Signs Last 24 Hrs  T(C): 36.3 (10 Sep 2018 16:24), Max: 37.3 (10 Sep 2018 12:45)  T(F): 97.3 (10 Sep 2018 16:24), Max: 99.1 (10 Sep 2018 12:45)  HR: 111 (10 Sep 2018 16:24) (111 - 126)  BP: 119/85 (10 Sep 2018 16:24) (112/79 - 126/82)  BP(mean): --  RR: 18 (10 Sep 2018 16:24) (18 - 18)  SpO2: 94% (10 Sep 2018 16:24) (92% - 99%)    PHYSICAL EXAM:  Constitutional:no acute distress  Eyes:WALT, EOMI  Ear/Nose/Throat: no oral lesions, 	  Respiratory: clear BL  Cardiovascular: S1S2  Gastrointestinal:soft, (+) BS, no tenderness  Extremities:no e/e/c  No Lymphadenopathy  IV sites not inflammed.    LABS:                        8.9    5.9   )-----------( 415      ( 10 Sep 2018 07:17 )             28.0     09-10    141  |  107  |  37<H>  ----------------------------<  99  4.1   |  19<L>  |  1.22    Ca    9.1      10 Sep 2018 07:17  Mg     1.9     09-09    TPro  x   /  Alb  x   /  TBili  0.6  /  DBili  x   /  AST  x   /  ALT  x   /  AlkPhos  x   09-09          MICROBIOLOGY:            RECENT CULTURES:  09-07 @ 13:51  .Broncial  --  --  --    Testing in progress  --  09-07 @ 13:02  .Body Fluid Pleural Fluid  --  --  --    Moderate Gram Variable Rods  --  09-07 @ 08:31  .Blood Blood  --  --  --    No growth to date.  --  09-05 @ 19:08  .Blood Blood-Peripheral  --  --  --    No growth to date.  --  09-05 @ 18:33  .Blood Blood-Venous  --  --  --    No growth to date.  --      RADIOLOGY & ADDITIONAL STUDIES:

## 2018-09-10 NOTE — PROGRESS NOTE ADULT - SUBJECTIVE AND OBJECTIVE BOX
Interventional Radiology Follow- Up Note      This is a 68y Male s/p heart transplant on 2/23/18 with Right lung mass s/p percutaneous right lung mass biopsy on 9/7 with Dr. Aguila. Pt seen and examined at bedside. Offers no complaints at this time.     Over weekend pt with drop in H/H with improvement after 1U PRBC. Currently undergoing anemia work up. Reviewed hem/onc and GI notes.       PAST MEDICAL & SURGICAL HISTORY:  HLD (hyperlipidemia)  Former smoker  DVT of upper extremity (deep vein thrombosis)  Hepatitis C virus  GIB (gastrointestinal bleeding)  Ventricular fibrillation: s/p AICD  PAF (paroxysmal atrial fibrillation): on xarelto  Non-Ischemic Cardiomyopathy  SVT (Supraventricular Tachycardia)  HTN  CHF (Congestive Heart Failure)  S/P right heart catheterization: biopsy multiple  H/O heart transplant: 2/2018  Status post left hip replacement  History of Prior Ablation Treatment: for afib  AICD (Automatic Cardioverter/Defibrillator) Present: [employer] with 1 [employer] lead4/1/09- explanted and replaced with [employer] 2 leads on 9/2/09      Allergies: No Known Allergies    LABS:                        8.9    5.9   )-----------( 415      ( 10 Sep 2018 07:17 )             28.0     09-10    141  |  107  |  37<H>  ----------------------------<  99  4.1   |  19<L>  |  1.22    Ca    9.1      10 Sep 2018 07:17  Mg     1.9     09-09    TPro  x   /  Alb  x   /  TBili  0.6  /  DBili  x   /  AST  x   /  ALT  x   /  AlkPhos  x   09-09    Right lung mass bx: Pending    Vitals: T(F): 98.2 (09-10-18 @ 07:53), Max: 98.4 (09-09-18 @ 13:48)  HR: 126 (09-10-18 @ 07:53) (112 - 126)  BP: 126/82 (09-10-18 @ 07:53) (106/77 - 126/82)  RR: 18 (09-10-18 @ 07:53) (18 - 18)  SpO2: 92% (09-10-18 @ 07:53) (92% - 95%)      PHYSICAL EXAM:  Gen: NAD, A&Ox3  Chest:      A/P: 68y Male admitted with History of heart transplant and right lung mass s/p bx on 9/7 with Dr. Aguila.  -F/U bx results  -Continue anemia work up per primary team, hem/onc and GI  -Continue global care per primary team  -trend vitals, labs  -will discuss with IR attending     Please call IR at extension [phone] with any questions, concerns, or issues regarding above.

## 2018-09-11 LAB
ANION GAP SERPL CALC-SCNC: 11 MMOL/L — SIGNIFICANT CHANGE UP (ref 5–17)
BUN SERPL-MCNC: 28 MG/DL — HIGH (ref 7–23)
CALCIUM SERPL-MCNC: 9.5 MG/DL — SIGNIFICANT CHANGE UP (ref 8.4–10.5)
CHLORIDE SERPL-SCNC: 100 MMOL/L — SIGNIFICANT CHANGE UP (ref 96–108)
CO2 SERPL-SCNC: 21 MMOL/L — LOW (ref 22–31)
CREAT SERPL-MCNC: 1.13 MG/DL — SIGNIFICANT CHANGE UP (ref 0.5–1.3)
GLUCOSE BLDC GLUCOMTR-MCNC: 112 MG/DL — HIGH (ref 70–99)
GLUCOSE BLDC GLUCOMTR-MCNC: 90 MG/DL — SIGNIFICANT CHANGE UP (ref 70–99)
GLUCOSE BLDC GLUCOMTR-MCNC: 97 MG/DL — SIGNIFICANT CHANGE UP (ref 70–99)
GLUCOSE BLDC GLUCOMTR-MCNC: 99 MG/DL — SIGNIFICANT CHANGE UP (ref 70–99)
GLUCOSE SERPL-MCNC: 135 MG/DL — HIGH (ref 70–99)
HCT VFR BLD CALC: 26.7 % — LOW (ref 39–50)
HGB BLD-MCNC: 8.2 G/DL — LOW (ref 13–17)
MCHC RBC-ENTMCNC: 26.9 PG — LOW (ref 27–34)
MCHC RBC-ENTMCNC: 30.6 GM/DL — LOW (ref 32–36)
MCV RBC AUTO: 87.9 FL — SIGNIFICANT CHANGE UP (ref 80–100)
NON-GYNECOLOGICAL CYTOLOGY STUDY: SIGNIFICANT CHANGE UP
PLATELET # BLD AUTO: 471 K/UL — HIGH (ref 150–400)
POTASSIUM SERPL-MCNC: 3.6 MMOL/L — SIGNIFICANT CHANGE UP (ref 3.5–5.3)
POTASSIUM SERPL-SCNC: 3.6 MMOL/L — SIGNIFICANT CHANGE UP (ref 3.5–5.3)
RBC # BLD: 3.03 M/UL — LOW (ref 4.2–5.8)
RBC # FLD: 20.4 % — HIGH (ref 10.3–14.5)
SODIUM SERPL-SCNC: 132 MMOL/L — LOW (ref 135–145)
TACROLIMUS SERPL-MCNC: 9.4 NG/ML — SIGNIFICANT CHANGE UP
VORICONAZOLE SERPL-MCNC: 6.7 MCG/ML — SIGNIFICANT CHANGE UP (ref 1–5.5)
WBC # BLD: 5.7 K/UL — SIGNIFICANT CHANGE UP (ref 3.8–10.5)
WBC # FLD AUTO: 5.7 K/UL — SIGNIFICANT CHANGE UP (ref 3.8–10.5)

## 2018-09-11 PROCEDURE — 99232 SBSQ HOSP IP/OBS MODERATE 35: CPT | Mod: GC

## 2018-09-11 PROCEDURE — 71045 X-RAY EXAM CHEST 1 VIEW: CPT | Mod: 26

## 2018-09-11 PROCEDURE — 99232 SBSQ HOSP IP/OBS MODERATE 35: CPT

## 2018-09-11 PROCEDURE — 99233 SBSQ HOSP IP/OBS HIGH 50: CPT | Mod: GC

## 2018-09-11 PROCEDURE — 93970 EXTREMITY STUDY: CPT | Mod: 26

## 2018-09-11 RX ORDER — IMIPENEM AND CILASTATIN 250; 250 MG/100ML; MG/100ML
500 INJECTION, POWDER, FOR SOLUTION INTRAVENOUS EVERY 8 HOURS
Qty: 0 | Refills: 0 | Status: DISCONTINUED | OUTPATIENT
Start: 2018-09-11 | End: 2018-09-26

## 2018-09-11 RX ADMIN — Medication 3 MILLIGRAM(S): at 08:59

## 2018-09-11 RX ADMIN — Medication 125 MILLIGRAM(S): at 01:08

## 2018-09-11 RX ADMIN — Medication 125 MILLIGRAM(S): at 12:28

## 2018-09-11 RX ADMIN — SODIUM CHLORIDE 3 MILLILITER(S): 9 INJECTION INTRAMUSCULAR; INTRAVENOUS; SUBCUTANEOUS at 06:52

## 2018-09-11 RX ADMIN — Medication 100 MILLIGRAM(S): at 21:39

## 2018-09-11 RX ADMIN — VORICONAZOLE 200 MILLIGRAM(S): 10 INJECTION, POWDER, LYOPHILIZED, FOR SOLUTION INTRAVENOUS at 17:24

## 2018-09-11 RX ADMIN — IMIPENEM AND CILASTATIN 100 MILLIGRAM(S): 250; 250 INJECTION, POWDER, FOR SOLUTION INTRAVENOUS at 23:51

## 2018-09-11 RX ADMIN — Medication 650 MILLIGRAM(S): at 08:26

## 2018-09-11 RX ADMIN — Medication 650 MILLIGRAM(S): at 20:06

## 2018-09-11 RX ADMIN — TACROLIMUS 0.25 MILLIGRAM(S): 5 CAPSULE ORAL at 20:06

## 2018-09-11 RX ADMIN — VORICONAZOLE 200 MILLIGRAM(S): 10 INJECTION, POWDER, LYOPHILIZED, FOR SOLUTION INTRAVENOUS at 06:39

## 2018-09-11 RX ADMIN — Medication 125 MILLIGRAM(S): at 17:23

## 2018-09-11 RX ADMIN — CEFEPIME 100 MILLIGRAM(S): 1 INJECTION, POWDER, FOR SOLUTION INTRAMUSCULAR; INTRAVENOUS at 06:32

## 2018-09-11 RX ADMIN — TACROLIMUS 0.5 MILLIGRAM(S): 5 CAPSULE ORAL at 08:26

## 2018-09-11 RX ADMIN — FAMOTIDINE 20 MILLIGRAM(S): 10 INJECTION INTRAVENOUS at 12:28

## 2018-09-11 RX ADMIN — Medication 125 MILLIGRAM(S): at 23:53

## 2018-09-11 RX ADMIN — Medication 1 TABLET(S): at 12:28

## 2018-09-11 RX ADMIN — Medication 125 MILLIGRAM(S): at 06:33

## 2018-09-11 RX ADMIN — ATOVAQUONE 1500 MILLIGRAM(S): 750 SUSPENSION ORAL at 12:28

## 2018-09-11 RX ADMIN — Medication 100 MILLIGRAM(S): at 06:31

## 2018-09-11 RX ADMIN — Medication 20 MILLIGRAM(S): at 21:35

## 2018-09-11 RX ADMIN — SODIUM CHLORIDE 3 MILLILITER(S): 9 INJECTION INTRAMUSCULAR; INTRAVENOUS; SUBCUTANEOUS at 13:54

## 2018-09-11 RX ADMIN — Medication 81 MILLIGRAM(S): at 12:28

## 2018-09-11 RX ADMIN — SODIUM CHLORIDE 3 MILLILITER(S): 9 INJECTION INTRAMUSCULAR; INTRAVENOUS; SUBCUTANEOUS at 21:35

## 2018-09-11 NOTE — DIETITIAN INITIAL EVALUATION ADULT. - PERTINENT LABORATORY DATA
Hgb/Hct:8.2/26.7-low, Na:132-low, K:3.6, Cl:100, BUN:28-high, Cr:1.13-high, Glucose:135-high, Ca:9.5,

## 2018-09-11 NOTE — PROGRESS NOTE ADULT - ASSESSMENT
Impression:  # Diarrhea: Suspect Immunosuppression induced. Patient with roughly one BM per day with intermittent watery diarrhea. Suspect secondary to immunosuppression or dietary. Rectal wall thickening seen on CT. Improved from admission, but continues to have loose stools Less likely microscopic colitis, medication induced colitis, neoplastic process less likely given negative colonoscopy prior to heart transplant last year. TSH is normal in 6/2018  # Anemia: Suspect multifactorial anemia of chronic disease +/- BM suppression from medications. No evidence of overt GI Bleed. Hemoglobin stable    Recommendations:  - Check Stool GI PCR if diarrhea continues.  - Continue PO vancomycin for C. Diff treatment. Can likely d/c Metronidazole as no evidence of severe C. Diff infection  - Supportive care per primary team    Mitzi King MD  Gastroenterology Fellow  [phone]  Please page on call fellow on weekends and after 5pm on weekdays Impression:  # Diarrhea: Suspect Immunosuppression induced. Patient with roughly one BM per day with intermittent watery diarrhea. Suspect secondary to immunosuppression or dietary. Rectal wall thickening seen on CT. Improved from admission, but continues to have loose stools Less likely microscopic colitis, medication induced colitis, neoplastic process less likely given negative colonoscopy prior to heart transplant last year. TSH is normal in 6/2018  # Anemia: Suspect multifactorial anemia of chronic disease +/- BM suppression from medications. No evidence of overt GI Bleed. Hemoglobin stable  # Poor appetite - no clinical signs of obstruction. ?due to underlying infectious etiology/fevers    Recommendations:  - Check Stool GI PCR if diarrhea continues.  - Workup per ID regarding fevers  - Continue PO vancomycin for C. Diff treatment as per ID. Can likely d/c Metronidazole as no evidence of severe C. Diff infection  - Supportive care per primary team    `    Mitzi King MD  Gastroenterology Fellow  [phone]  Please page on call fellow on weekends and after 5pm on weekdays

## 2018-09-11 NOTE — PROGRESS NOTE ADULT - ATTENDING COMMENTS
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%  off cellcept due to infections  off valcyte due to leukopenia  RTA IV due to CNI  s/p HepC treatment  Recent Cdiff 8/18  Hx of Ue DVT  hx of LVAD explant, NICM  anemia    Lung biopsy: 9/7 gram positive rods  FOBT -    euvolemic on exam  hb 8, wbc 5.7, tacro 9.4    As per discussion with ID this could be nocardia which would need further speciation, he has this hx of corynecbacterium and being immunosuppressed he could be affected by it; in regards to his diarrhea it has improved but not stopped which is concerning to me    - if diarrhea increased tomorrow will plan for proctoscopy  - check b/l UE US (he will likely need iv antibiotics for a prolonged course)  - I would favor a slow taper of po vanco for his c diff colitis 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%  off cellcept due to infections  off valcyte due to leukopenia  RTA IV due to CNI  s/p HepC treatment  Recent Cdiff 8/18  Hx of Ue DVT  hx of LVAD explant, NICM  anemia    Lung biopsy: 9/7 gram positive rods  CMV PCR 9/5 not detected  FOBT -    euvolemic on exam  hb 8, wbc 5.7, tacro 9.4    As per discussion with ID this could be nocardia which would need further speciation, he has this hx of corynecbacterium and being immunosuppressed he could be affected by it; in regards to his diarrhea it has improved but not stopped which is concerning to me    - if diarrhea increased tomorrow will plan for proctoscopy  - check b/l UE US (he will likely need iv antibiotics for a prolonged course)  - I would favor a slow taper of po vanco for his c diff colitis  - appetite stimulant  - per heme no indication for bm bx at this time

## 2018-09-11 NOTE — PROGRESS NOTE ADULT - PROBLEM SELECTOR PLAN 5
- Continuing on voriconizole 200mg Q12  - repeat biopsy as above - Continuing on voriconizole 200mg Q12

## 2018-09-11 NOTE — PROGRESS NOTE ADULT - SUBJECTIVE AND OBJECTIVE BOX
Subjective Hello"OOB in chair no acute distress I am ok"    VITAL SIGNS    Telemetry:    -120  Vital Signs Last 24 Hrs  T(C): 37.6 (18 @ 08:14), Max: 37.6 (18 @ 08:14)  T(F): 99.7 (18 @ 08:14), Max: 99.7 (18 @ 08:14)  HR: 130 (18 @ 08:14) (111 - 130)  BP: 117/80 (18 @ 08:14) (109/75 - 121/82)  RR: 18 (18 @ 08:14) (18 - 18)  SpO2: 92% (18 @ 08:14) (92% - 94%)           09-10 @ 07:01  -   @ 07:00  --------------------------------------------------------  IN: 460 mL / OUT: 901 mL / NET: -441 mL     @ 07:01  -   @ 13:47  --------------------------------------------------------  IN: 0 mL / OUT: 200 mL / NET: -200 mL  Daily Weight in k (11 Sep 2018 07:57)  POCT Blood Glucose.: 99 mg/dL (11 Sep 2018 11:43)  POCT Blood Glucose.: 90 mg/dL (11 Sep 2018 07:26)  POCT Blood Glucose.: 92 mg/dL (10 Sep 2018 21:41)  POCT Blood Glucose.: 96 mg/dL (10 Sep 2018 19:28)  Tacro level 9.4	      MEDICATIONS  (STANDING):  aspirin enteric coated 81 milliGRAM(s) Oral daily  atovaquone Suspension 1500 milliGRAM(s) Oral daily  Calcium Citrate + Vit D 315mg/200 IU 2 Tablet(s) 2 Tablet(s) Oral <User Schedule>  cefepime   IVPB 2000 milliGRAM(s) IV Intermittent every 12 hours  famotidine    Tablet 20 milliGRAM(s) Oral daily  insulin lispro (HumaLOG) corrective regimen sliding scale   SubCutaneous three times a day before meals  insulin lispro (HumaLOG) corrective regimen sliding scale   SubCutaneous at bedtime  lactated ringers. 1000 milliLiter(s) (100 mL/Hr) IV Continuous <Continuous>  metroNIDAZOLE  IVPB 500 milliGRAM(s) IV Intermittent every 8 hours  multivitamin 1 Tablet(s) Oral daily  pravastatin 20 milliGRAM(s) Oral at bedtime  predniSONE   Tablet 3 milliGRAM(s) Oral <User Schedule>  sodium bicarbonate 650 milliGRAM(s) Oral <User Schedule>  sodium chloride 0.9% lock flush 3 milliLiter(s) IV Push every 8 hours    tacrolimus 0.5 milliGRAM(s) Oral <User Schedule>  tacrolimus    0.5 mG/mL Suspension 0.25 milliGRAM(s) Oral <User Schedule>  vancomycin    Solution 125 milliGRAM(s) Oral every 6 hours  voriconazole 200 milliGRAM(s) Oral every 12 hours    MEDICATIONS  (PRN):  acetaminophen   Tablet .. 650 milliGRAM(s) Oral every 6 hours PRN Temp greater or equal to 38C (100.4F)  dextrose 40% Gel 15 Gram(s) Oral once PRN Blood Glucose LESS THAN 70 milliGRAM(s)/deciliter  glucagon  Injectable 1 milliGRAM(s) IntraMuscular once PRN Glucose LESS THAN 70 milligrams/deciliter  HYDROcodone/homatropine Syrup 5 milliLiter(s) Oral every 8 hours PRN Cough  PHYSICAL EXAM  Neurology: alert and oriented x 3, nonfocal, no gross deficits  CV :S1 S2 RRR  Sternal Wound : Healed Sternuim Stable  Lungs: b/l breath CTA  Abdomen: soft, nontender, nondistended, positive bowel sounds, last bowel movement 9/10  :   voids        Extremities: b/l le warm wellperfused            Physical Therapy Rec:   Discussed with Cardiothoracic Team at AM rounds.

## 2018-09-11 NOTE — PROGRESS NOTE ADULT - ASSESSMENT
67 y/o male with PMH NICM HFrEF s/p HM2 LVAD 6/2017, who underwent heart transplant on 2/23/18 (CMV D-/R+ and Toxo D-/R+, Hep C+ heart) with post op graft dysfunction who underwent plasmapheresis and IVIG x2 as well as rituximab, with suspected fungal PNA diagnosed 6/2018  treated w voriconazole, recurrent PNA with new RUL infiltrate on CT scan 8/18, which  improved on broad spectrum antibiotics. S/p lung biopsy 8/18. C diff toxin positive -still  on oral vanco,.  Patient presents today for RHC and cardiac biopsy with c/o ongoing diarrhea (3-4 loose BM daily) and mildly productive cough, denies fevers, chills, abd pain, N/V. EKG demonstrated ST at 133bpm. Pt readmitted for further workup.  9/6 one formed BM this am; CMV study's pending, BC pending afebrile, albumin 500 cc for dehydration  potassium supplemented, transition to regular diet.   9/7 VVS; underwent IR procedure of Right lung mass FNA.  Tolerated procedure well.  Culture pending. Vanco IV D/C per ID. Continue with current medication regimen. Send Legionella urine culture and check voriconazole (T) in AM   Disposition: Home once medically cleared   9/8 + fever x1 101.7 as per ID cefepine decreased to 2g bid ivpb  9/9 VSS. 1 liquid/soft BM. Heme consulted for neutropenia possible bone marrow Bx. Deferring Bx at this time until anemia work up complete. Tacro 11.8. F/U HF recs  9/10 VSS 1loos liquid BM needs Bone marrow biopsy Tacro11.5  Hycodan for cough  9/11 VSS B/L UE Duplex fro assessment need PICC long term abx 67 y/o male with PMH NICM HFrEF s/p HM2 LVAD 6/2017, who underwent heart transplant on 2/23/18 (CMV D-/R+ and Toxo D-/R+, Hep C+ heart) with post op graft dysfunction who underwent plasmapheresis and IVIG x2 as well as rituximab, with suspected fungal PNA diagnosed 6/2018  treated w voriconazole, recurrent PNA with new RUL infiltrate on CT scan 8/18, which  improved on broad spectrum antibiotics. S/p lung biopsy 8/18. C diff toxin positive -still  on oral vanco,.  Patient presents today for RHC and cardiac biopsy with c/o ongoing diarrhea (3-4 loose BM daily) and mildly productive cough, denies fevers, chills, abd pain, N/V. EKG demonstrated ST at 133bpm. Pt readmitted for further workup.  9/6 one formed BM this am; CMV study's pending, BC pending afebrile, albumin 500 cc for dehydration  potassium supplemented, transition to regular diet.   9/7 VVS; underwent IR procedure of Right lung mass FNA.  Tolerated procedure well.  Culture pending. Vanco IV D/C per ID. Continue with current medication regimen. Send Legionella urine culture and check voriconazole (T) in AM   Disposition: Home once medically cleared   9/8 + fever x1 101.7 as per ID cefepine decreased to 2g bid ivpb  9/9 VSS. 1 liquid/soft BM. Heme consulted for neutropenia possible bone marrow Bx. Deferring Bx at this time until anemia work up complete. Tacro 11.8. 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

## 2018-09-11 NOTE — PROGRESS NOTE ADULT - PROBLEM SELECTOR PLAN 2
cont Tacrolimus dose  0.5 mg PO in AM/ 0.25 mg PO suspension in PM.  (goal 8-10, currently 9.4).  CellCept on hold due to infection.  Prednisone 3 mg po daily.    Antimicrobial prophylaxis:  Intermediate risk CMV - Valcyte initially held due to leukopenia. He is 6 months post-transplant so will monitor CMV PCR. .  Intermediate risk Toxo - continue Mepron 1500 mg po daily  PCP prophy - covered by Mepron.  Hep C+ - he completed a course of Mayvret with undetectable Hep C virus now. Will need follow-up increased risk donor testing at 12 months post-OHT.    Other prophylaxis:  Pepcid 20 mg po daily  ECASA 81 mg po daily  Citracal + D 2 tabs po BID  Pravachol 20 mg po QHS.  Please supplement with IV mag PRN for goal of 2-2.5. cont Tacrolimus dose  0.5 mg PO in AM/ 0.25 mg PO suspension in PM.  (goal 8-10, currently 9.4).  CellCept on hold due to infection.  Prednisone 3 mg po daily.    Antimicrobial prophylaxis:  Intermediate risk CMV - Valcyte initially held due to leukopenia. He is 6 months post-transplant so will monitor CMV PCR. .  Intermediate risk Toxo - continue Mepron 1500 mg po daily  PCP prophy - covered by Mepron.  Hep C+ - he completed a course of Mayvret with undetectable Hep C virus now. Will need follow-up increased risk donor testing at 12 months post-OHT.    Other prophylaxis:  Pepcid 20 mg po daily  ECASA 81 mg po daily  Citracal + D 2 tabs po BID  Pravachol 20 mg po QHS.

## 2018-09-11 NOTE — PROGRESS NOTE ADULT - PROBLEM SELECTOR PLAN 1
- ID following (currently on Vancomycin and Cefepime)  - CT scan reviewed - underwent repeat biopsy of RUL Mass - GNR prelimb - will discuss results with ID - ? GP beaded rods

## 2018-09-11 NOTE — PROGRESS NOTE ADULT - PROBLEM SELECTOR PLAN 3
s/p 9/7 Right lung FNA   ID following    as per ID Dr Bruce Cefepime  decreased 2 G IV every 12 hours  Continue voriconazole 200 milliGRAM(s) Oral every 12 hours  Check Voriconazole (T) level in AM send and pending  PICC for  ABX   9/11 Vascular duple UE for PICC eval

## 2018-09-11 NOTE — PROGRESS NOTE ADULT - SUBJECTIVE AND OBJECTIVE BOX
Pt states that diarrhea is worse today. Felt improved yesterday. He had one loose BM this am.     MEDICATIONS:  aspirin enteric coated 81 milliGRAM(s) Oral daily    atovaquone Suspension 1500 milliGRAM(s) Oral daily  cefepime   IVPB 2000 milliGRAM(s) IV Intermittent every 12 hours  metroNIDAZOLE  IVPB 500 milliGRAM(s) IV Intermittent every 8 hours  vancomycin    Solution 125 milliGRAM(s) Oral every 6 hours  voriconazole 200 milliGRAM(s) Oral every 12 hours    HYDROcodone/homatropine Syrup 5 milliLiter(s) Oral every 8 hours PRN    acetaminophen   Tablet .. 650 milliGRAM(s) Oral every 6 hours PRN    famotidine    Tablet 20 milliGRAM(s) Oral daily    dextrose 40% Gel 15 Gram(s) Oral once PRN  dextrose 50% Injectable 12.5 Gram(s) IV Push once  dextrose 50% Injectable 25 Gram(s) IV Push once  dextrose 50% Injectable 25 Gram(s) IV Push once  glucagon  Injectable 1 milliGRAM(s) IntraMuscular once PRN  insulin lispro (HumaLOG) corrective regimen sliding scale   SubCutaneous three times a day before meals  insulin lispro (HumaLOG) corrective regimen sliding scale   SubCutaneous at bedtime  pravastatin 20 milliGRAM(s) Oral at bedtime  predniSONE   Tablet 3 milliGRAM(s) Oral <User Schedule>    dextrose 5%. 1000 milliLiter(s) IV Continuous <Continuous>  lactated ringers. 1000 milliLiter(s) IV Continuous <Continuous>  multivitamin 1 Tablet(s) Oral daily  sodium bicarbonate 650 milliGRAM(s) Oral <User Schedule>  sodium chloride 0.9% lock flush 3 milliLiter(s) IV Push every 8 hours  sodium chloride 0.9%. 1000 milliLiter(s) IV Continuous <Continuous>  tacrolimus 0.5 milliGRAM(s) Oral <User Schedule>  tacrolimus    0.5 mG/mL Suspension 0.25 milliGRAM(s) Oral <User Schedule>      PHYSICAL EXAM:  T(C): 37.6 (09-11-18 @ 08:14), Max: 37.6 (09-11-18 @ 08:14)  HR: 130 (09-11-18 @ 08:14) (111 - 130)  BP: 117/80 (09-11-18 @ 08:14) (109/75 - 121/82)  RR: 18 (09-11-18 @ 08:14) (18 - 18)  SpO2: 92% (09-11-18 @ 08:14) (92% - 99%)  Wt(kg): --  I&O's Summary    10 Sep 2018 07:01  -  11 Sep 2018 07:00  --------------------------------------------------------  IN: 460 mL / OUT: 901 mL / NET: -441 mL        Appearance: Normal, NAD  HEENT:   Normal oral mucosa, PERRL, EOMI	  Lymphatic: No lymphadenopathy  Cardiovascular: Normal S1 S2, No JVD, No murmurs, No edema  Respiratory: Lungs clear to auscultation	  Psychiatry: A & O x 3, Mood & affect appropriate  Gastrointestinal:  Soft, Non-tender, + BS	  Skin: No rashes, No ecchymoses, No cyanosis	  Neurologic: Non-focal  Extremities: Normal range of motion, No clubbing, cyanosis or edema  Vascular: Peripheral pulses palpable bilaterally        LABS:	 	    CBC Full  -  ( 11 Sep 2018 09:25 )  WBC Count : 5.7 K/uL  Hemoglobin : 8.2 g/dL  Hematocrit : 26.7 %  Platelet Count - Automated : 471 K/uL  Mean Cell Volume : 87.9 fl  Mean Cell Hemoglobin : 26.9 pg  Mean Cell Hemoglobin Concentration : 30.6 gm/dL  Auto Neutrophil # : x  Auto Lymphocyte # : x  Auto Monocyte # : x  Auto Eosinophil # : x  Auto Basophil # : x  Auto Neutrophil % : x  Auto Lymphocyte % : x  Auto Monocyte % : x  Auto Eosinophil % : x  Auto Basophil % : x    09-11    132<L>  |  100  |  28<H>  ----------------------------<  135<H>  3.6   |  21<L>  |  1.13  09-10    141  |  107  |  37<H>  ----------------------------<  99  4.1   |  19<L>  |  1.22    Ca    9.5      11 Sep 2018 09:25  Ca    9.1      10 Sep 2018 07:17    TPro  x   /  Alb  x   /  TBili  0.6  /  DBili  x   /  AST  x   /  ALT  x   /  AlkPhos  x   09-09

## 2018-09-11 NOTE — PROGRESS NOTE ADULT - PROBLEM SELECTOR PLAN 3
- Continue PO vanc 125mg [id number]  - Continue flagyl 500mg IV Q8 hours  - Cdiff PCR pending - Continue PO vanc 125mg [id number]  - Continue flagyl 500mg IV Q8 hours  - ? repeat Cdiff PCR

## 2018-09-11 NOTE — DIETITIAN INITIAL EVALUATION ADULT. - ORAL INTAKE PTA
good/Pt reports a good PO intake, states he continues to receive meals on wheels and HHA helps to prepare meals daily.

## 2018-09-11 NOTE — PROGRESS NOTE ADULT - SUBJECTIVE AND OBJECTIVE BOX
Chief Complaint:  Patient is a 68y old  Male who presents with a chief complaint of Infection (11 Sep 2018 10:18)    Interval Events:   No acute overnight events. Patient reports one soft BM yesterday and one watery BM this morning    Allergies:  No Known Allergies    Hospital Medications:  acetaminophen   Tablet .. 650 milliGRAM(s) Oral every 6 hours PRN  aspirin enteric coated 81 milliGRAM(s) Oral daily  atovaquone Suspension 1500 milliGRAM(s) Oral daily  Calcium Citrate + Vit D 315mg/200 IU 2 Tablet(s) 2 Tablet(s) Oral <User Schedule>  cefepime   IVPB 2000 milliGRAM(s) IV Intermittent every 12 hours  dextrose 40% Gel 15 Gram(s) Oral once PRN  dextrose 5%. 1000 milliLiter(s) IV Continuous <Continuous>  dextrose 50% Injectable 12.5 Gram(s) IV Push once  dextrose 50% Injectable 25 Gram(s) IV Push once  dextrose 50% Injectable 25 Gram(s) IV Push once  famotidine    Tablet 20 milliGRAM(s) Oral daily  glucagon  Injectable 1 milliGRAM(s) IntraMuscular once PRN  HYDROcodone/homatropine Syrup 5 milliLiter(s) Oral every 8 hours PRN  insulin lispro (HumaLOG) corrective regimen sliding scale   SubCutaneous three times a day before meals  insulin lispro (HumaLOG) corrective regimen sliding scale   SubCutaneous at bedtime  lactated ringers. 1000 milliLiter(s) IV Continuous <Continuous>  metroNIDAZOLE  IVPB 500 milliGRAM(s) IV Intermittent every 8 hours  multivitamin 1 Tablet(s) Oral daily  pravastatin 20 milliGRAM(s) Oral at bedtime  predniSONE   Tablet 3 milliGRAM(s) Oral <User Schedule>  sodium bicarbonate 650 milliGRAM(s) Oral <User Schedule>  sodium chloride 0.9% lock flush 3 milliLiter(s) IV Push every 8 hours  sodium chloride 0.9%. 1000 milliLiter(s) IV Continuous <Continuous>  tacrolimus 0.5 milliGRAM(s) Oral <User Schedule>  tacrolimus    0.5 mG/mL Suspension 0.25 milliGRAM(s) Oral <User Schedule>  vancomycin    Solution 125 milliGRAM(s) Oral every 6 hours  voriconazole 200 milliGRAM(s) Oral every 12 hours    PMHX/PSHX:  HLD (hyperlipidemia)  Former smoker  DVT of upper extremity (deep vein thrombosis)  Hepatitis C virus  GIB (gastrointestinal bleeding)  H/O prior ablation treatment  Ventricular fibrillation  PAF (paroxysmal atrial fibrillation)  Non-Ischemic Cardiomyopathy  SVT (Supraventricular Tachycardia)  HTN  CHF (Congestive Heart Failure)  S/P right heart catheterization  H/O heart transplant  LVAD (left ventricular assist device) present  Status post left hip replacement  Hypertension  Hypertension  History of Prior Ablation Treatment  AICD (Automatic Cardioverter/Defibrillator) Present    Family history:  No pertinent family history in first degree relatives    ROS:   General:  No fevers or chills  ENT:  No sore throat or dysphagia  CV:  No pain or palpitations  Resp:  No dyspnea, cough or  wheezing  GI:  + loose stools, No pain, No nausea, No vomiting, No rectal bleeding, No tarry stools,  Skin:  No rash or edema    PHYSICAL EXAM:   Vital Signs:  Vital Signs Last 24 Hrs  T(C): 37.6 (11 Sep 2018 08:14), Max: 37.6 (11 Sep 2018 08:14)  T(F): 99.7 (11 Sep 2018 08:14), Max: 99.7 (11 Sep 2018 08:14)  HR: 130 (11 Sep 2018 08:14) (111 - 130)  BP: 117/80 (11 Sep 2018 08:14) (109/75 - 121/82)  BP(mean): --  RR: 18 (11 Sep 2018 08:14) (18 - 18)  SpO2: 92% (11 Sep 2018 08:14) (92% - 94%)  Daily     Daily Weight in k (11 Sep 2018 07:57)    GENERAL:  NAD, Appears stated age  HEENT:  NC/AT,  conjunctivae clear and pink, sclera -anicteric  CHEST:  CTA B/L, Normal effort  HEART:  Tachycardic, Regular rhythm, S1/S2, No murmurs  ABDOMEN:  Soft, non-tender, non-distended, normoactive bowel sounds,  no masses ,no hepatomegaly  EXTEREMITIES:  No cyanosis or Edema  SKIN:  Warm & Dry. No rash or erythema  NEURO:  Alert, oriented    LABS:                        8.2    5.7   )-----------( 471      ( 11 Sep 2018 09:25 )             26.7     Mean Cell Volume: 87.9 fl (18 @ 09:25)        132<L>  |  100  |  28<H>  ----------------------------<  135<H>  3.6   |  21<L>  |  1.13    Ca    9.5      11 Sep 2018 09:25    TPro  x   /  Alb  x   /  TBili  0.6  /  DBili  x   /  AST  x   /  ALT  x   /  AlkPhos  x       Hemoglobin: 8.2 g/dL (18 @ 09:25)  Hemoglobin: 8.9 g/dL (09-10-18 @ 07:17)  Hemoglobin: 8.2 g/dL (18 @ 17:49)  Hemoglobin: 6.5 g/dL (18 @ 07:38)  Hemoglobin: 7.2 g/dL (18 @ 09:13)    Imaging: Chief Complaint:  Patient is a 68y old  Male who presents with a chief complaint of Infection (11 Sep 2018 10:18)    Interval Events:   No acute overnight events. Patient reports one soft BM yesterday and one watery BM this morning    Allergies:  No Known Allergies    Hospital Medications:  acetaminophen   Tablet .. 650 milliGRAM(s) Oral every 6 hours PRN  aspirin enteric coated 81 milliGRAM(s) Oral daily  atovaquone Suspension 1500 milliGRAM(s) Oral daily  Calcium Citrate + Vit D 315mg/200 IU 2 Tablet(s) 2 Tablet(s) Oral <User Schedule>  cefepime   IVPB 2000 milliGRAM(s) IV Intermittent every 12 hours  dextrose 40% Gel 15 Gram(s) Oral once PRN  dextrose 5%. 1000 milliLiter(s) IV Continuous <Continuous>  dextrose 50% Injectable 12.5 Gram(s) IV Push once  dextrose 50% Injectable 25 Gram(s) IV Push once  dextrose 50% Injectable 25 Gram(s) IV Push once  famotidine    Tablet 20 milliGRAM(s) Oral daily  glucagon  Injectable 1 milliGRAM(s) IntraMuscular once PRN  HYDROcodone/homatropine Syrup 5 milliLiter(s) Oral every 8 hours PRN  insulin lispro (HumaLOG) corrective regimen sliding scale   SubCutaneous three times a day before meals  insulin lispro (HumaLOG) corrective regimen sliding scale   SubCutaneous at bedtime  lactated ringers. 1000 milliLiter(s) IV Continuous <Continuous>  metroNIDAZOLE  IVPB 500 milliGRAM(s) IV Intermittent every 8 hours  multivitamin 1 Tablet(s) Oral daily  pravastatin 20 milliGRAM(s) Oral at bedtime  predniSONE   Tablet 3 milliGRAM(s) Oral <User Schedule>  sodium bicarbonate 650 milliGRAM(s) Oral <User Schedule>  sodium chloride 0.9% lock flush 3 milliLiter(s) IV Push every 8 hours  sodium chloride 0.9%. 1000 milliLiter(s) IV Continuous <Continuous>  tacrolimus 0.5 milliGRAM(s) Oral <User Schedule>  tacrolimus    0.5 mG/mL Suspension 0.25 milliGRAM(s) Oral <User Schedule>  vancomycin    Solution 125 milliGRAM(s) Oral every 6 hours  voriconazole 200 milliGRAM(s) Oral every 12 hours    PMHX/PSHX:  HLD (hyperlipidemia)  Former smoker  DVT of upper extremity (deep vein thrombosis)  Hepatitis C virus  GIB (gastrointestinal bleeding)  H/O prior ablation treatment  Ventricular fibrillation  PAF (paroxysmal atrial fibrillation)  Non-Ischemic Cardiomyopathy  SVT (Supraventricular Tachycardia)  HTN  CHF (Congestive Heart Failure)  S/P right heart catheterization  H/O heart transplant  LVAD (left ventricular assist device) present  Status post left hip replacement  Hypertension  Hypertension  History of Prior Ablation Treatment  AICD (Automatic Cardioverter/Defibrillator) Present    Family history:  No pertinent family history in first degree relatives    ROS:   General:  No fevers or chills  ENT:  No sore throat or dysphagia  CV:  No pain or palpitations  Resp:  No dyspnea, cough or  wheezing  GI:  + loose stools, No pain, No nausea, No vomiting, No rectal bleeding, No tarry stools,  Skin:  No rash or edema    PHYSICAL EXAM:   Vital Signs:  Vital Signs Last 24 Hrs  T(C): 37.6 (11 Sep 2018 08:14), Max: 37.6 (11 Sep 2018 08:14)  T(F): 99.7 (11 Sep 2018 08:14), Max: 99.7 (11 Sep 2018 08:14)  HR: 130 (11 Sep 2018 08:14) (111 - 130)  BP: 117/80 (11 Sep 2018 08:14) (109/75 - 121/82)  BP(mean): --  RR: 18 (11 Sep 2018 08:14) (18 - 18)  SpO2: 92% (11 Sep 2018 08:14) (92% - 94%)  Daily     Daily Weight in k (11 Sep 2018 07:57)    GENERAL:  NAD, Appears stated age  HEENT:  NC/AT,  conjunctivae clear and pink, sclera -anicteric  CHEST:  CTA B/L, Normal effort  HEART:  Tachycardic, Regular rhythm, S1/S2, No murmurs  ABDOMEN:  Soft, non-tender, non-distended, normoactive bowel sounds,  no masses ,no hepatomegaly  EXTEREMITIES:  No cyanosis or Edema  SKIN:  Warm & Dry. No rash or erythema  NEURO:  Alert, oriented    LABS:                        8.2    5.7   )-----------( 471      ( 11 Sep 2018 09:25 )             26.7     Mean Cell Volume: 87.9 fl (18 @ 09:25)        132<L>  |  100  |  28<H>  ----------------------------<  135<H>  3.6   |  21<L>  |  1.13    Ca    9.5      11 Sep 2018 09:25    TPro  x   /  Alb  x   /  TBili  0.6  /  DBili  x   /  AST  x   /  ALT  x   /  AlkPhos  x       Hemoglobin: 8.2 g/dL (18 @ 09:25)  Hemoglobin: 8.9 g/dL (09-10-18 @ 07:17)  Hemoglobin: 8.2 g/dL (18 @ 17:49)  Hemoglobin: 6.5 g/dL (18 @ 07:38)  Hemoglobin: 7.2 g/dL (18 @ 09:13)

## 2018-09-11 NOTE — PROGRESS NOTE ADULT - PROBLEM SELECTOR PLAN 4
- recheck CBC and transfuse for H<7  - FOBT and hemolysis labs  - heme consult for possible BM biopsy - recheck CBC and transfuse for H<7  - FOBT neg and labs not c/w hemolysis   - heme consult appreciated and no need for BM bx at this time

## 2018-09-11 NOTE — DIETITIAN INITIAL EVALUATION ADULT. - OTHER INFO
Pt seen for LOS and Heart transplant re-admission. Pt seen sitting in chair, reports a poor appetite in house. Consuming 40-80% of meals and. Pt denies PO supplements such as Nepro and Glucerna at this time. Pt state she is tired of receiving these supplements and no longer has an appetite for them. Pt willing to try Diet health shakes 2x daily. Pt denies need for education at this time, states his BG levels have been well controlled and he knows about food safety. Pt made aware RD remains available for education review as amenable. Pt reports an improvement in GI distress, only 1BM thus far today. Pt chewing/swallowing difficulty. NKFA

## 2018-09-11 NOTE — DIETITIAN INITIAL EVALUATION ADULT. - ENERGY NEEDS
Ht: 5'8", Wt: 156.2lbs, BMI: 24.4kg/m2, IBW: 154lvs(+/-10%), 100%IBW  Pertinent information: Pt admitted for fever and diarrhea. Per chart, Pt with history of heart transplant and recurrent C-Diff. Per chart. s/p Lung Bx and per ID ? if diarrhea is related to immunosuppression induced.    +2 tara hand Edema, Skin intact

## 2018-09-11 NOTE — DIETITIAN INITIAL EVALUATION ADULT. - PT NOT SOURCE
Pt well known to this RD. Pt with  limited interested in RD interview at this time as Pt states he was just recently here and nothing about nutrition has changes./other (specify)

## 2018-09-11 NOTE — DIETITIAN INITIAL EVALUATION ADULT. - NS AS NUTRI INTERV ED CONTENT
Other (specify)/Pt denies diet education at this time. Pt aware RD remains available to monitor PO intake, wt, labs and diet education review,

## 2018-09-11 NOTE — PROGRESS NOTE ADULT - ATTENDING COMMENTS
Patient seen and examined. Agree with above. The patient has one BM daily that is soft - thus, no plans for additional GI or endoscopic workup with regards to the resolving diarrhea. Would finish course of antibiotics for C diff. With regards to the decreased appetite, would first workup and treat the cause of patient's fevers, suspect that it is due to underlying medical condition. No evidence of mechanical GI process.

## 2018-09-11 NOTE — CHART NOTE - NSCHARTNOTEFT_GEN_A_CORE
Based on further workup, anemia is likely due to chronic inflammation as ferritin very high and TIBC is low. Other cell lines are intact, no need for bone marrow biopsy. Please call back with any further questions.

## 2018-09-11 NOTE — PROGRESS NOTE ADULT - PROBLEM SELECTOR PLAN 1
Continue tacrolimus, check daily tacro level at 0730  Continue prednisone 3mg daily, mepron 1500mg daily, voriconazole 200 BID.  Continue pepcid, calcium, MVI, sodium bicarb BID.  Continue asa and statin.  Check  daily mg level  today 1.6 supplemented  Pending CMV PCR  aspergillius pending Continue tacrolimus, check daily tacro level at 0730  Continue prednisone 3mg daily, mepron 1500mg daily, voriconazole 200 BID.  Continue pepcid, calcium, MVI, sodium bicarb BID.  Continue asa and statin.  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

## 2018-09-11 NOTE — PROGRESS NOTE ADULT - ASSESSMENT
69 yo M s/p heart transplant complicated by rejection with rounds of plasmaphoresis , IVIG, rituximab x2, known colonizer with  Pseudomonas last treated for PNA in June 2018  and again in August 14-30 with cefepime and voriconazole as well as c.diff with PO vanco now presents with decreased PO intake, 4 watery BM daily and a productive cough. Pt had CT guided R lung mass bx on 8/17 which showed acute inflammation but was negative bacterial or fungal etiology. Concerned now for cdiff colitis vs CMV colitis.     last admission labs:   Fungitell negative  Galactomannan negative  Crypto Ag negative  Quant indeterminate, however lung biopsy w negative AFB stain  Pathology report from 8/17 R lung bx showing inflammation w negative AFB and GMS stain  CT guided biopsy cultures are NGTD- only grew a corynebacterium that is likely not a pathogen  Spoke with the micro. lab: pleural biopsy tissue with gram positive beaded rods- maldi-tof unable to identify. The lab is awaiting further growht of the organism for repeat testing  ? Actino/nocardia  He is responding again to C.diff treatment and Vanco/Cefepime will wait for further identification of the organism prior to switching therapy    Hep C neg (8/22)    fungitell neg  galactomannan neg     Suggest:  Continue PO Vancomycin 125mg q6hrs and flagyl 500 mg q 8 hours - now with pasty stools  Stool for GI PCR- negative  CMV PCR- negative  F/U blood cultures - NGTD  continue cefepime 2 grams IV q 12 hours- BAL now changed to ?nocardia- possible cefepime is partially treating nocardia and that's why pt improving in btwn hospital visits vs    pseudomonal pna  Monitor CrCl while on abx  Continue Mepron 1500mg daily  Continue Voriconazole 200mg q12hrs - F/U voriconazole level  Monitor Tacrolimus level  Pt s/p IR biopsy-specimen sent for culture, fungal, AFB, and nocardia and pathology

## 2018-09-11 NOTE — PROGRESS NOTE ADULT - SUBJECTIVE AND OBJECTIVE BOX
Pt bathed in bathroom with much assistance, tolerated well, returned to chair. Follow Up:  Overnight no events. Pt feels well. Still w cough productive of clear sputum. Had one episode of diarrhea today. No abd pain.    ROS:    All other systems negative    Constitutional: no fever, no chills  Head: no trauma  Eyes: no vision changes, no eye pain  ENT:  no sore throat, no rhinorrhea  Cardiovascular:  no chest pain, no palpitation  Respiratory:  no SOB, + cough  GI:  no abd pain, no vomiting, no diarrhea  urinary: no dysuria, no hematuria, no flank pain  musculoskeletal:  no joint pain, no joint swelling  skin:  no rash  neurology:  no headache, no seizure, no change in mental status  psych: no anxiety, no depression         Allergies  No Known Allergies        ANTIMICROBIALS:  atovaquone Suspension 1500 daily  cefepime   IVPB 2000 every 12 hours  metroNIDAZOLE  IVPB 500 every 8 hours  vancomycin    Solution 125 every 6 hours  voriconazole 200 every 12 hours      OTHER MEDS:  acetaminophen   Tablet .. 650 milliGRAM(s) Oral every 6 hours PRN  aspirin enteric coated 81 milliGRAM(s) Oral daily  Calcium Citrate + Vit D 315mg/200 IU 2 Tablet(s) 2 Tablet(s) Oral <User Schedule>  dextrose 40% Gel 15 Gram(s) Oral once PRN  dextrose 5%. 1000 milliLiter(s) IV Continuous <Continuous>  dextrose 50% Injectable 12.5 Gram(s) IV Push once  dextrose 50% Injectable 25 Gram(s) IV Push once  dextrose 50% Injectable 25 Gram(s) IV Push once  famotidine    Tablet 20 milliGRAM(s) Oral daily  glucagon  Injectable 1 milliGRAM(s) IntraMuscular once PRN  HYDROcodone/homatropine Syrup 5 milliLiter(s) Oral every 8 hours PRN  insulin lispro (HumaLOG) corrective regimen sliding scale   SubCutaneous three times a day before meals  insulin lispro (HumaLOG) corrective regimen sliding scale   SubCutaneous at bedtime  lactated ringers. 1000 milliLiter(s) IV Continuous <Continuous>  multivitamin 1 Tablet(s) Oral daily  pravastatin 20 milliGRAM(s) Oral at bedtime  predniSONE   Tablet 3 milliGRAM(s) Oral <User Schedule>  sodium bicarbonate 650 milliGRAM(s) Oral <User Schedule>  sodium chloride 0.9% lock flush 3 milliLiter(s) IV Push every 8 hours  sodium chloride 0.9%. 1000 milliLiter(s) IV Continuous <Continuous>  tacrolimus 0.5 milliGRAM(s) Oral <User Schedule>  tacrolimus    0.5 mG/mL Suspension 0.25 milliGRAM(s) Oral <User Schedule>      Vital Signs Last 24 Hrs  T(C): 37.1 (11 Sep 2018 17:21), Max: 37.6 (11 Sep 2018 08:14)  T(F): 98.8 (11 Sep 2018 17:21), Max: 99.7 (11 Sep 2018 08:14)  HR: 111 (11 Sep 2018 17:21) (111 - 130)  BP: 103/71 (11 Sep 2018 17:21) (103/71 - 121/82)  BP(mean): --  RR: 18 (11 Sep 2018 17:21) (18 - 19)  SpO2: 96% (11 Sep 2018 17:21) (92% - 96%)    Physical Exam:  General:    NAD,  non toxic, A&O x 3  Head: atraumatic, normocephalic  Eye: normal sclera and conjunctiva  ENT:    no oropharyngeal lesions,   no LAD,   neck supple  Cardio:     regular S1, S2  Respiratory:    Crackles in RLL, no wheezing  abd:     soft,   BS +,   no tenderness,    no organomegaly  :   no CVAT,  no suprapubic tenderness,   no  diaz  Musculoskeletal:   no joint swelling,   no edema  vascular: no lines, normal pulses  Skin:    no rash  Neurologic:     no focal deficit  psych: normal affect, no suicidal ideation                          8.2    5.7   )-----------( 471      ( 11 Sep 2018 09:25 )             26.7       09-11    132<L>  |  100  |  28<H>  ----------------------------<  135<H>  3.6   |  21<L>  |  1.13    Ca    9.5      11 Sep 2018 09:25            MICROBIOLOGY:  v  .Broncial  09-07-18   Moderate Rule Out Nocardia species  --  --      .Body Fluid Pleural Fluid  09-07-18   Moderate Rule Out Nocardia species  --    Upon re-evaluation of gram stain:  polymorphonuclear leukocytes seen per low power field  Gram Positive Rods  by cytocentrifuge  Previously reported as:  Moderate polymorphonuclear leukocytes per low power field  No organisms seen per oil power field      .Blood Blood  09-07-18   No growth to date.  --  --      .Blood Blood-Peripheral  09-05-18   No growth at 5 days.  --  --      .Blood Blood-Venous  09-05-18   No growth at 5 days.  --  --      Toxoplasma IgG Screen: 4.7 IU/mL (06-14-18 @ 09:42)  HIV-1 RNA Quantitative, Viral Load Log: NOT DET. lg /mL (05-31-18 @ 19:10)  CMV IgG Antibody: 5.40 U/mL (05-25-18 @ 17:51)  CMV IgG Antibody: >10.00 U/mL (02-22-18 @ 23:45)  HIV-1 RNA Quantitative, Viral Load Log: NOT DET. lg /mL (02-02-18 @ 19:25)    Rapid RVP Result: NotDetec (09-07 @ 15:32)      RADIOLOGY:  CXR- Loculated right-sided pleural effusion is unchanged

## 2018-09-11 NOTE — PROGRESS NOTE ADULT - ATTENDING COMMENTS
Patient seen and examined with Dr. ARCHIBALD  I agree with her interval history, exam findings and plans as noted above  Patient with cultures from the lung preliminary identification as nocardia species- awaiting confirmation    Currently without IV access- if able to obtain will change Cefepime to Imipenem 500mg IV q 8hrs.  If unable to obtain IV access would start oral Bactrim DS bid  Will need MRI of head to r/o CNS disease    Gary Lujan MD  [phone]  After 5pm/weekends [phone]

## 2018-09-12 LAB
ANION GAP SERPL CALC-SCNC: 14 MMOL/L — SIGNIFICANT CHANGE UP (ref 5–17)
BUN SERPL-MCNC: 22 MG/DL — SIGNIFICANT CHANGE UP (ref 7–23)
CALCIUM SERPL-MCNC: 9.1 MG/DL — SIGNIFICANT CHANGE UP (ref 8.4–10.5)
CHLORIDE SERPL-SCNC: 102 MMOL/L — SIGNIFICANT CHANGE UP (ref 96–108)
CO2 SERPL-SCNC: 20 MMOL/L — LOW (ref 22–31)
CREAT SERPL-MCNC: 0.93 MG/DL — SIGNIFICANT CHANGE UP (ref 0.5–1.3)
CULTURE RESULTS: SIGNIFICANT CHANGE UP
CULTURE RESULTS: SIGNIFICANT CHANGE UP
GLUCOSE BLDC GLUCOMTR-MCNC: 142 MG/DL — HIGH (ref 70–99)
GLUCOSE BLDC GLUCOMTR-MCNC: 91 MG/DL — SIGNIFICANT CHANGE UP (ref 70–99)
GLUCOSE BLDC GLUCOMTR-MCNC: 98 MG/DL — SIGNIFICANT CHANGE UP (ref 70–99)
GLUCOSE BLDC GLUCOMTR-MCNC: 98 MG/DL — SIGNIFICANT CHANGE UP (ref 70–99)
GLUCOSE SERPL-MCNC: 98 MG/DL — SIGNIFICANT CHANGE UP (ref 70–99)
HCT VFR BLD CALC: 26.7 % — LOW (ref 39–50)
HGB BLD-MCNC: 8.6 G/DL — LOW (ref 13–17)
MCHC RBC-ENTMCNC: 28.5 PG — SIGNIFICANT CHANGE UP (ref 27–34)
MCHC RBC-ENTMCNC: 32.2 GM/DL — SIGNIFICANT CHANGE UP (ref 32–36)
MCV RBC AUTO: 88.5 FL — SIGNIFICANT CHANGE UP (ref 80–100)
PLATELET # BLD AUTO: 473 K/UL — HIGH (ref 150–400)
POTASSIUM SERPL-MCNC: 5.2 MMOL/L — SIGNIFICANT CHANGE UP (ref 3.5–5.3)
POTASSIUM SERPL-SCNC: 5.2 MMOL/L — SIGNIFICANT CHANGE UP (ref 3.5–5.3)
RBC # BLD: 3.02 M/UL — LOW (ref 4.2–5.8)
RBC # FLD: 20.7 % — HIGH (ref 10.3–14.5)
SODIUM SERPL-SCNC: 136 MMOL/L — SIGNIFICANT CHANGE UP (ref 135–145)
SPECIMEN SOURCE: SIGNIFICANT CHANGE UP
SPECIMEN SOURCE: SIGNIFICANT CHANGE UP
TACROLIMUS SERPL-MCNC: 8.2 NG/ML — SIGNIFICANT CHANGE UP
WBC # BLD: 5.4 K/UL — SIGNIFICANT CHANGE UP (ref 3.8–10.5)
WBC # FLD AUTO: 5.4 K/UL — SIGNIFICANT CHANGE UP (ref 3.8–10.5)

## 2018-09-12 PROCEDURE — 77001 FLUOROGUIDE FOR VEIN DEVICE: CPT | Mod: 26

## 2018-09-12 PROCEDURE — 36584 COMPL RPLCMT PICC RS&I: CPT

## 2018-09-12 PROCEDURE — 99233 SBSQ HOSP IP/OBS HIGH 50: CPT | Mod: GC

## 2018-09-12 PROCEDURE — 71045 X-RAY EXAM CHEST 1 VIEW: CPT | Mod: 26

## 2018-09-12 PROCEDURE — 71045 X-RAY EXAM CHEST 1 VIEW: CPT | Mod: 26,77

## 2018-09-12 PROCEDURE — 99232 SBSQ HOSP IP/OBS MODERATE 35: CPT | Mod: GC

## 2018-09-12 RX ADMIN — Medication 81 MILLIGRAM(S): at 12:35

## 2018-09-12 RX ADMIN — VORICONAZOLE 200 MILLIGRAM(S): 10 INJECTION, POWDER, LYOPHILIZED, FOR SOLUTION INTRAVENOUS at 06:27

## 2018-09-12 RX ADMIN — TACROLIMUS 0.25 MILLIGRAM(S): 5 CAPSULE ORAL at 20:29

## 2018-09-12 RX ADMIN — IMIPENEM AND CILASTATIN 100 MILLIGRAM(S): 250; 250 INJECTION, POWDER, FOR SOLUTION INTRAVENOUS at 14:25

## 2018-09-12 RX ADMIN — Medication 20 MILLIGRAM(S): at 23:11

## 2018-09-12 RX ADMIN — Medication 3 MILLIGRAM(S): at 08:01

## 2018-09-12 RX ADMIN — IMIPENEM AND CILASTATIN 100 MILLIGRAM(S): 250; 250 INJECTION, POWDER, FOR SOLUTION INTRAVENOUS at 06:27

## 2018-09-12 RX ADMIN — Medication 100 MILLIGRAM(S): at 14:00

## 2018-09-12 RX ADMIN — FAMOTIDINE 20 MILLIGRAM(S): 10 INJECTION INTRAVENOUS at 12:35

## 2018-09-12 RX ADMIN — Medication 125 MILLIGRAM(S): at 12:35

## 2018-09-12 RX ADMIN — IMIPENEM AND CILASTATIN 100 MILLIGRAM(S): 250; 250 INJECTION, POWDER, FOR SOLUTION INTRAVENOUS at 23:10

## 2018-09-12 RX ADMIN — VORICONAZOLE 200 MILLIGRAM(S): 10 INJECTION, POWDER, LYOPHILIZED, FOR SOLUTION INTRAVENOUS at 20:29

## 2018-09-12 RX ADMIN — SODIUM CHLORIDE 3 MILLILITER(S): 9 INJECTION INTRAMUSCULAR; INTRAVENOUS; SUBCUTANEOUS at 06:32

## 2018-09-12 RX ADMIN — Medication 125 MILLIGRAM(S): at 06:27

## 2018-09-12 RX ADMIN — Medication 650 MILLIGRAM(S): at 08:01

## 2018-09-12 RX ADMIN — TACROLIMUS 0.5 MILLIGRAM(S): 5 CAPSULE ORAL at 08:02

## 2018-09-12 RX ADMIN — Medication 125 MILLIGRAM(S): at 23:11

## 2018-09-12 RX ADMIN — ATOVAQUONE 1500 MILLIGRAM(S): 750 SUSPENSION ORAL at 12:35

## 2018-09-12 RX ADMIN — SODIUM CHLORIDE 3 MILLILITER(S): 9 INJECTION INTRAMUSCULAR; INTRAVENOUS; SUBCUTANEOUS at 13:51

## 2018-09-12 RX ADMIN — Medication 100 MILLIGRAM(S): at 06:26

## 2018-09-12 RX ADMIN — SODIUM CHLORIDE 3 MILLILITER(S): 9 INJECTION INTRAMUSCULAR; INTRAVENOUS; SUBCUTANEOUS at 23:09

## 2018-09-12 RX ADMIN — Medication 1 TABLET(S): at 12:36

## 2018-09-12 RX ADMIN — Medication 650 MILLIGRAM(S): at 20:27

## 2018-09-12 NOTE — PROGRESS NOTE ADULT - SUBJECTIVE AND OBJECTIVE BOX
Follow Up:  Overnight no events. Pt had R PICC placed today but arm is swollen and PICC not working. Denies abd pain. Had one episode of loose stool today.    ROS:    All other systems negative    Constitutional: no fever, no chills  Head: no trauma  Eyes: no vision changes, no eye pain  ENT:  no sore throat, no rhinorrhea  Cardiovascular:  no chest pain, no palpitation  Respiratory:  no SOB,  cough  GI:  no abd pain, no vomiting, no diarrhea  urinary: no dysuria, no hematuria, no flank pain  musculoskeletal:  no joint pain, no joint swelling  skin:  no rash  neurology:  no headache, no seizure, no change in mental status  psych: no anxiety, no depression         Allergies  No Known Allergies        ANTIMICROBIALS:  atovaquone Suspension 1500 daily  imipenem/cilastatin  IVPB 500 every 8 hours  vancomycin    Solution 125 every 6 hours  voriconazole 200 every 12 hours      OTHER MEDS:  acetaminophen   Tablet .. 650 milliGRAM(s) Oral every 6 hours PRN  aspirin enteric coated 81 milliGRAM(s) Oral daily  Calcium Citrate + Vit D 315mg/200 IU 2 Tablet(s) 2 Tablet(s) Oral <User Schedule>  dextrose 40% Gel 15 Gram(s) Oral once PRN  dextrose 5%. 1000 milliLiter(s) IV Continuous <Continuous>  dextrose 50% Injectable 12.5 Gram(s) IV Push once  dextrose 50% Injectable 25 Gram(s) IV Push once  dextrose 50% Injectable 25 Gram(s) IV Push once  famotidine    Tablet 20 milliGRAM(s) Oral daily  glucagon  Injectable 1 milliGRAM(s) IntraMuscular once PRN  HYDROcodone/homatropine Syrup 5 milliLiter(s) Oral every 8 hours PRN  insulin lispro (HumaLOG) corrective regimen sliding scale   SubCutaneous three times a day before meals  insulin lispro (HumaLOG) corrective regimen sliding scale   SubCutaneous at bedtime  multivitamin 1 Tablet(s) Oral daily  pravastatin 20 milliGRAM(s) Oral at bedtime  predniSONE   Tablet 3 milliGRAM(s) Oral <User Schedule>  sodium bicarbonate 650 milliGRAM(s) Oral <User Schedule>  sodium chloride 0.9% lock flush 3 milliLiter(s) IV Push every 8 hours  tacrolimus 0.5 milliGRAM(s) Oral <User Schedule>  tacrolimus    0.5 mG/mL Suspension 0.25 milliGRAM(s) Oral <User Schedule>      Vital Signs Last 24 Hrs  T(C): 36.8 (12 Sep 2018 12:32), Max: 37.8 (12 Sep 2018 05:44)  T(F): 98.2 (12 Sep 2018 12:32), Max: 100.1 (12 Sep 2018 05:44)  HR: 128 (12 Sep 2018 12:32) (109 - 129)  BP: 112/80 (12 Sep 2018 12:32) (103/71 - 122/84)  BP(mean): --  RR: 18 (12 Sep 2018 12:32) (18 - 19)  SpO2: 92% (12 Sep 2018 12:32) (92% - 96%)    Physical Exam:  General:    NAD,  non toxic, A&O x 3  Head: atraumatic, normocephalic  Eye: normal sclera and conjunctiva  ENT:    no oropharyngeal lesions,   no LAD,   neck supple  Cardio:     regular S1, S2,   Respiratory:    clear b/l,    no wheezing  abd:     soft,   BS +,   no tenderness,    no organomegaly  :   no CVAT,  no suprapubic tenderness  Musculoskeletal:   RUE more edematous than LUE  Skin:    no rash  Neurologic:     no focal deficit  psych: normal affect, no suicidal ideation                          8.6    5.4   )-----------( 473      ( 12 Sep 2018 07:43 )             26.7       09-12    136  |  102  |  22  ----------------------------<  98  5.2   |  20<L>  |  0.93    Ca    9.1      12 Sep 2018 07:43            MICROBIOLOGY:  trisha  .Bjorn  09-07-18   Moderate Rule Out Nocardia species  --  --      .Body Fluid Pleural Fluid  09-07-18   Moderate Rule Out Nocardia species  --    Upon re-evaluation of gram stain:  polymorphonuclear leukocytes seen per low power field  Gram Positive Rods  by cytocentrifuge  Previously reported as:  Moderate polymorphonuclear leukocytes per low power field  No organisms seen per oil power field      .Blood Blood  09-07-18   No growth at 5 days.  --  --      .Blood Blood-Peripheral  09-05-18   No growth at 5 days.  --  --      .Blood Blood-Venous  09-05-18   No growth at 5 days.  --  --      .Body Fluid Lung - Upper Lobe Right  08-17-18   Few Corynebacterium species  "Susceptibilities not performed"  --    polymorphonuclear leukocytes seen  No organisms seen  by cytocentrifuge      .Urine Clean Catch (Midstream)  08-15-18   No growth  --  --      .Blood Blood-Peripheral  08-14-18   No growth at 5 days.  --  --      .Abscess Arm - Right  08-14-18   No growth at 5 days  --  --      .Sputum Sputum  08-14-18   Normal Respiratory Heaven present  --    Rare polymorphonuclear leukocytes per low power field  Rare Squamous epithelial cells per low power field  Moderate Gram Negative Rods per oil power field        Toxoplasma IgG Screen: 4.7 IU/mL (06-14-18 @ 09:42)  HIV-1 RNA Quantitative, Viral Load Log: NOT DET. lg /mL (05-31-18 @ 19:10)  CMV IgG Antibody: 5.40 U/mL (05-25-18 @ 17:51)  CMV IgG Antibody: >10.00 U/mL (02-22-18 @ 23:45)  HIV-1 RNA Quantitative, Viral Load Log: NOT DET. lg /mL (02-02-18 @ 19:25)    Rapid RVP Result: NotDetec (09-07 @ 15:32)        RADIOLOGY:   CXR- New right-sided PICC line distal tip coiled in the right subclavian vein.    Unchanged loculated right-sided pleural effusion.

## 2018-09-12 NOTE — PROGRESS NOTE ADULT - ASSESSMENT
69 y/o M with history of NICM now s/p heart transplantation on 2/23/18. 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 He has received therapy with IVIG, plamsapheresis, and rituximab. RHC 9/5 shows low normal filing pressures and high normal output. His presentation with low grade fevers, malaise, and persistent diarrhea concerning for recurrent infection, despite treatment for presumed fungal pneumonia and c.diff. Last CMV negative on 9/5. Enlargement of RUL opacity noted on chest CT compared with that from 8/27, LLL opacity stable. Mild rectal wall thickening noted, ongoing diarrhea but improving.

## 2018-09-12 NOTE — PROGRESS NOTE ADULT - SUBJECTIVE AND OBJECTIVE BOX
Subjective ' I am doing ok"    VITAL SIGNS    Telemetry:  -130    Vital Signs Last 24 Hrs  T(C): 36.8 (18 @ 12:32), Max: 37.8 (18 @ 05:44)  T(F): 98.2 (18 @ 12:32), Max: 100.1 (18 @ 05:44)  HR: 128 (18 @ 12:32) (109 - 129)  BP: 112/80 (18 @ 12:32) (103/71 - 122/84)  RR: 18 (18 @ 12:32) (18 - 19)  SpO2: 92% (18 @ 12:32) (92% - 96%)            @ 07:01  -   @ 07:00  --------------------------------------------------------  IN: 240 mL / OUT: 375 mL / NET: -135 mL    Daily Weight in k.4 (12 Sep 2018 08:24)MEDICATIONS  (STANDING):  aspirin enteric coated 81 milliGRAM(s) Oral daily  atovaquone Suspension 1500 milliGRAM(s) Oral daily  Calcium Citrate + Vit D 315mg/200 IU 2 Tablet(s) 2 Tablet(s) Oral <User Schedule>  famotidine    Tablet 20 milliGRAM(s) Oral daily  imipenem/cilastatin  IVPB 500 milliGRAM(s) IV Intermittent every 8 hours  insulin lispro (HumaLOG) corrective regimen sliding scale   SubCutaneous three times a day before meals  insulin lispro (HumaLOG) corrective regimen sliding scale   SubCutaneous at bedtime  lactated ringers. 1000 milliLiter(s) (100 mL/Hr) IV Continuous <Continuous>  metroNIDAZOLE  IVPB 500 milliGRAM(s) IV Intermittent every 8 hours  multivitamin 1 Tablet(s) Oral daily  pravastatin 20 milliGRAM(s) Oral at bedtime  predniSONE   Tablet 3 milliGRAM(s) Oral <User Schedule>  sodium bicarbonate 650 milliGRAM(s) Oral <User Schedule>  sodium chloride 0.9% lock flush 3 milliLiter(s) IV Push every 8 hours  sodium chloride 0.9%. 1000 milliLiter(s) (100 mL/Hr) IV Continuous <Continuous>  tacrolimus 0.5 milliGRAM(s) Oral <User Schedule>  tacrolimus    0.5 mG/mL Suspension 0.25 milliGRAM(s) Oral <User Schedule>  vancomycin    Solution 125 milliGRAM(s) Oral every 6 hours  voriconazole 200 milliGRAM(s) Oral every 12 hours    MEDICATIONS  (PRN):  acetaminophen   Tablet .. 650 milliGRAM(s) Oral every 6 hours PRN Temp greater or equal to 38C (100.4F)  dextrose 40% Gel 15 Gram(s) Oral once PRN Blood Glucose LESS THAN 70 milliGRAM(s)/deciliter  glucagon  Injectable 1 milliGRAM(s) IntraMuscular once PRN Glucose LESS THAN 70 milligrams/deciliter  HYDROcodone/homatropine Syrup 5 milliLiter(s) Oral every 8 hours PRN Cough    CAPILLARY BLOOD GLUCOSE  POCT Blood Glucose.: 98 mg/dL (12 Sep 2018 12:23)  POCT Blood Glucose.: 91 mg/dL (12 Sep 2018 07:47)  POCT Blood Glucose.: 97 mg/dL (11 Sep 2018 21:46)  POCT Blood Glucose.: 112 mg/dL (11 Sep 2018 19:48)      PHYSICAL EXAM  Neurology: alert and oriented x 3, nonfocal, no gross deficits  CV :S1 S2 RRR  Sternal Wound : NAZ sternum Stable  Lungs: B/L Breath  sounds   Abdomen: soft, nontender, nondistended, positive bowel sounds, last bowel movement 9.12  :      Voids       Extremities:     b/l le warm wellperfused      Physical Therapy Rec:   Home  [ x ]   Home w/ PT  [  ]  Rehab  [  ]    Discussed with Cardiothoracic Team at AM rounds.

## 2018-09-12 NOTE — PROGRESS NOTE ADULT - SUBJECTIVE AND OBJECTIVE BOX
Interventional Radiology Procedure Note    Procedure: Right arm exchange of malpositioned PICC line for 5 [place]ymra[place] Midline     Indication: Pulmonary Nocardia Infection    Operators: Dr. Que Fuentes/ YESENIA Canchola    Anesthesia (type): Lidocaine 2%    Contrast: 5 ml     EBL: Minimal     Findings/Follow up Plan of Care: 5 [place]ymra[place] Single Lumen Midline placed via exchange through previous Right arm PICC line site.  Unable to advance wire past the Subclavian vein.  Venogram was done and demonstrated Central Occlusion and multiple collateral vessels were seen at the level of the Right axillary vein.  Catheter length was 22 cm.  Midline catheter can be used immediately.  Please make sure all things given through the Right arm midline are compatible with midline and no blood draws from midline.  Case was d/w CT surgery NP Nahomi Grove.      Specimens Removed: None    Implants: 5F 22 cm midline     Complications: NONE    Condition/Disposition: Stable, sent back to [facility] in stable condition.    Please call Interventional Radiology x [phone] with any questions, concerns, or issues. Interventional Radiology Procedure Note    Procedure: Right Upper Extremity Venogram, Right arm exchange of malpositioned PICC line for 5 Turkish Midline     Indication: Pulmonary Nocardia Infection    Operators: Dr. Que Fuentes/ YESENIA Canchola    Anesthesia (type): Lidocaine 2%    Contrast: 5 ml     EBL: Minimal     Findings/Follow up Plan of Care: 5 Turkish Single Lumen Midline placed via exchange through previous Right arm PICC line site.  Unable to advance wire past the Subclavian vein.  Venogram was done and demonstrated Central Occlusion and multiple collateral vessels were seen at the level of the Right axillary vein.  Catheter length was 22 cm.  Midline catheter can be used immediately.  Please make sure all things given through the Right arm midline are compatible with midline and no blood draws from midline.  Case was d/w CT surgery NP Nahomi Grove.      Specimens Removed: None    Implants: 5F 22 cm midline     Complications: NONE    Condition/Disposition: Stable, sent back to 2 [facility] in stable condition.    Please call Interventional Radiology x [phone] with any questions, concerns, or issues.

## 2018-09-12 NOTE — PROGRESS NOTE ADULT - ASSESSMENT
69 yo M s/p heart transplant complicated by rejection with rounds of plasmaphoresis , IVIG, rituximab x2, known colonizer with  Pseudomonas last treated for PNA in June 2018  and again in August 14-30 with cefepime and voriconazole as well as c.diff with PO vanco now presents with decreased PO intake, 4 watery BM daily and a productive cough. Pt had CT guided R lung mass bx on 8/17 which showed acute inflammation but was negative bacterial or fungal etiology. Concerned now for cdiff colitis vs CMV colitis.     last admission labs:   Fungitell negative  Galactomannan negative  Crypto Ag negative  Quant indeterminate, however lung biopsy w negative AFB stain  Pathology report from 8/17 R lung bx showing inflammation w negative AFB and GMS stain  CT guided biopsy cultures are NGTD- only grew a corynebacterium that is likely not a pathogen  Spoke with the micro. lab: pleural biopsy tissue with gram positive beaded rods- maldi-tof unable to identify. The lab is awaiting further growht of the organism for repeat testing  ? Actino/nocardia  He is responding again to C.diff treatment and Vanco/Cefepime will wait for further identification of the organism prior to switching therapy    Hep C neg (8/22)    fungitell neg  galactomannan neg     Suggest:  -Continue PO Vancomycin 125mg q6hrs for cdiff  -D/c IV flagyl  -Stool for GI PCR- negative  -CMV PCR- negative  -F/U blood cultures - NGTD  -BAL now changed to ?nocardia- possible cefepime is partially treating nocardia and that's why pt improving in btwn hospital visits vs    pseudomonal pna  - D/c cefepime  - c/w imipenem  -Monitor CrCl while on abx  -Continue Mepron 1500mg daily  -Continue Voriconazole 200mg q12hrs - F/U voriconazole level  -Monitor Tacrolimus level  - Pt s/p IR biopsy-specimen sent for culture, fungal, AFB, and nocardia and pathology

## 2018-09-12 NOTE — PROGRESS NOTE ADULT - PROBLEM SELECTOR PLAN 1
- ID following, due to high suspicion for nocardia cefepime discontinued and imipenem initiated yesterday - will f/u ID regarding treatment duration   - will need PICC line   - underwent repeat biopsy of RUL Mass -  GP beaded rods Sepsis 2/2 PNA   - ID following, due to high suspicion for nocardia cefepime discontinued and imipenem initiated yesterday - will f/u ID regarding treatment duration   - will need PICC line   - underwent repeat biopsy of RUL Mass -  GP beaded rods

## 2018-09-12 NOTE — PROGRESS NOTE ADULT - ATTENDING COMMENTS
Patient seen and examined with Dr. Fang  I agree with her interval history, exam findings and plans as noted above    Patient changed to Imipenem for suspected Nocardia with organism ID and sensitivity pending    C.diff diarrhea- reports loose stool only once day   Will discontinue the flagyl and taper the Vanco  to tid  PICC line placed but non-functional- to be adjusted  Needs MRI of the head to r/o CNS involvement    Gary Lujan MD  [phone]  After 5pm/weekends [phone] Patient seen and examined with Dr. Fang  I agree with her interval history, exam findings and plans as noted above    Patient changed to Imipenem for suspected Nocardia with organism ID and sensitivity pending    C.diff diarrhea- reports loose stool only once day   Will discontinue the flagyl and taper the Vanco  to tid  PICC line placed but non-functional- to be adjusted  Needs MRI of the head to r/o CNS involvement  Will discuss with CHF service about discontinuing the Voriconazole    Gary Lujan MD  [phone]  After 5pm/weekends [phone]

## 2018-09-12 NOTE — PROGRESS NOTE ADULT - PROBLEM SELECTOR PLAN 2
cont Tacrolimus dose  0.5 mg PO in AM/ 0.25 mg PO suspension in PM.  (goal 8-10, currently at goal at 8.2).  CellCept on hold due to infection.  Prednisone 3 mg po daily.    Antimicrobial prophylaxis:  Intermediate risk CMV - Valcyte initially held due to leukopenia. He is 6 months post-transplant so will monitor CMV PCR. - will repeat this week   Intermediate risk Toxo - continue Mepron 1500 mg po daily  PCP prophy - covered by Mepron.  Hep C+ - he completed a course of Mayvret with undetectable Hep C virus now. Will need follow-up increased risk donor testing at 12 months post-OHT.    Other prophylaxis:  Pepcid 20 mg po daily  ECASA 81 mg po daily  Citracal + D 2 tabs po BID  Pravachol 20 mg po QHS.

## 2018-09-12 NOTE — PROGRESS NOTE ADULT - PROBLEM SELECTOR PLAN 3
s/p 9/7 Right lung FNA   ID following    as per ID Dr Lujan Imipenem/cilastin 500mg IV q8  Continue voriconazole 200 milliGRAM(s) Oral every 12 hours  Check Voriconazole (T) level in AM send and pending  9/11 Vascular duple UE for PICC eval  9/12 PICC

## 2018-09-12 NOTE — PROGRESS NOTE ADULT - SUBJECTIVE AND OBJECTIVE BOX
Chief Complaint:  Patient is a 68y old  Male who presents with a chief complaint of recurrent PNA post heart transplant (12 Sep 2018 11:29)    Interval Events:   Continues to report poor appetite. Reports one bowel movement per day over the last few days. Each day his stool is increasingly formed    Allergies:  No Known Allergies    Hospital Medications:  acetaminophen   Tablet .. 650 milliGRAM(s) Oral every 6 hours PRN  aspirin enteric coated 81 milliGRAM(s) Oral daily  atovaquone Suspension 1500 milliGRAM(s) Oral daily  Calcium Citrate + Vit D 315mg/200 IU 2 Tablet(s) 2 Tablet(s) Oral <User Schedule>  dextrose 40% Gel 15 Gram(s) Oral once PRN  dextrose 5%. 1000 milliLiter(s) IV Continuous <Continuous>  dextrose 50% Injectable 12.5 Gram(s) IV Push once  dextrose 50% Injectable 25 Gram(s) IV Push once  dextrose 50% Injectable 25 Gram(s) IV Push once  famotidine    Tablet 20 milliGRAM(s) Oral daily  glucagon  Injectable 1 milliGRAM(s) IntraMuscular once PRN  HYDROcodone/homatropine Syrup 5 milliLiter(s) Oral every 8 hours PRN  imipenem/cilastatin  IVPB 500 milliGRAM(s) IV Intermittent every 8 hours  insulin lispro (HumaLOG) corrective regimen sliding scale   SubCutaneous three times a day before meals  insulin lispro (HumaLOG) corrective regimen sliding scale   SubCutaneous at bedtime  lactated ringers. 1000 milliLiter(s) IV Continuous <Continuous>  metroNIDAZOLE  IVPB 500 milliGRAM(s) IV Intermittent every 8 hours  multivitamin 1 Tablet(s) Oral daily  pravastatin 20 milliGRAM(s) Oral at bedtime  predniSONE   Tablet 3 milliGRAM(s) Oral <User Schedule>  sodium bicarbonate 650 milliGRAM(s) Oral <User Schedule>  sodium chloride 0.9% lock flush 3 milliLiter(s) IV Push every 8 hours  sodium chloride 0.9%. 1000 milliLiter(s) IV Continuous <Continuous>  tacrolimus 0.5 milliGRAM(s) Oral <User Schedule>  tacrolimus    0.5 mG/mL Suspension 0.25 milliGRAM(s) Oral <User Schedule>  vancomycin    Solution 125 milliGRAM(s) Oral every 6 hours  voriconazole 200 milliGRAM(s) Oral every 12 hours    PMHX/PSHX:  HLD (hyperlipidemia)  Former smoker  DVT of upper extremity (deep vein thrombosis)  Hepatitis C virus  GIB (gastrointestinal bleeding)  H/O prior ablation treatment  Ventricular fibrillation  PAF (paroxysmal atrial fibrillation)  Non-Ischemic Cardiomyopathy  SVT (Supraventricular Tachycardia)  HTN  CHF (Congestive Heart Failure)  S/P right heart catheterization  H/O heart transplant  LVAD (left ventricular assist device) present  Status post left hip replacement  Hypertension  Hypertension  History of Prior Ablation Treatment  AICD (Automatic Cardioverter/Defibrillator) Present    Family history:  No pertinent family history in first degree relatives    ROS:   General:  No fevers or chills  ENT:  No sore throat or dysphagia  CV:  No pain or palpitations  Resp:  No dyspnea, cough or  wheezing  GI:  No diarrhea, No pain, No nausea, No vomiting, No rectal bleeding, No tarry stools,  Skin:  No rash or edema    PHYSICAL EXAM:   Vital Signs:  Vital Signs Last 24 Hrs  T(C): 37.8 (12 Sep 2018 05:44), Max: 37.8 (12 Sep 2018 05:44)  T(F): 100.1 (12 Sep 2018 05:44), Max: 100.1 (12 Sep 2018 05:44)  HR: 129 (12 Sep 2018 05:44) (109 - 129)  BP: 122/84 (12 Sep 2018 05:44) (103/71 - 122/84)  BP(mean): --  RR: 18 (12 Sep 2018 05:44) (18 - 19)  SpO2: 93% (12 Sep 2018 05:44) (93% - 96%)  Daily     Daily Weight in k.4 (12 Sep 2018 08:24)    GENERAL:  NAD, Appears stated age  HEENT:  NC/AT,  conjunctivae clear and pink, sclera -anicteric  CHEST:  CTA B/L, Normal effort  HEART:  Tachycardic, Regular rhythm, S1/S2, No murmurs  ABDOMEN:  Soft, non-tender, non-distended, normoactive bowel sounds,  no masses ,no hepatomegaly  EXTEREMITIES:  No cyanosis or Edema  SKIN:  Warm & Dry. No rash or erythema  NEURO:  Alert, oriented    LABS:                        8.6    5.4   )-----------( 473      ( 12 Sep 2018 07:43 )             26.7     Mean Cell Volume: 88.5 fl (18 @ 07:43)        136  |  102  |  22  ----------------------------<  98  5.2   |  20<L>  |  0.93    Ca    9.1      12 Sep 2018 07:43

## 2018-09-12 NOTE — PROGRESS NOTE ADULT - ASSESSMENT
Impression:  # Diarrhea: Suspect Immunosuppression induced. Patient with roughly one BM per day with intermittent watery diarrhea. Suspect secondary to immunosuppression or dietary. Rectal wall thickening seen on CT. Improved from admission with treatment for C. Diff (suspect likely etiology of symptoms).   # Anemia: Suspect multifactorial anemia of chronic disease +/- BM suppression from medications. No evidence of overt GI Bleed. Hemoglobin stable  # Poor appetite - no clinical signs of obstruction. ?due to underlying infectious etiology/fevers    Recommendations:  - Check Stool GI PCR if diarrhea recurs.  - Workup per ID regarding fevers. Antibiotics per ID.   - Supportive care per primary team    Please call with questions    Mitzi King MD  Gastroenterology Fellow  [phone]  Please page on call fellow on weekends and after 5pm on weekdays

## 2018-09-12 NOTE — PROGRESS NOTE ADULT - PROBLEM SELECTOR PLAN 4
- recheck CBC and transfuse for H<7  - FOBT neg and labs not c/w hemolysis   - heme consult appreciated and no need for BM bx at this time

## 2018-09-12 NOTE — PROGRESS NOTE ADULT - PROBLEM SELECTOR PLAN 1
Continue tacrolimus, check daily tacro level at 0730  Continue prednisone 3mg daily, mepron 1500mg daily, voriconazole 200 BID.  Continue pepcid, calcium, MVI, sodium bicarb BID.  Continue asa and statin.  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

## 2018-09-12 NOTE — PROGRESS NOTE ADULT - ATTENDING COMMENTS
Agree with above. Would treat with a course of antibiotics for C diff. Patient's diarrhea has now resolved - only 1 BM per day. Would continue rest of care and infectious workup per primary team.

## 2018-09-12 NOTE — PROGRESS NOTE ADULT - ATTENDING COMMENTS
Mr Crocker post heart transplant, complex course w multiple infections readmitted for infection    Active issues  PNA (high suspicion for nocardia)  Diarrhea  s/p OHT 2/23/18 (CMV -/+, toxo-/+, HCV viremic donor)  Chronic AMR s/p rituximab + IVIG+ PPx  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%  off cellcept due to infections  off valcyte due to leukopenia  RTA IV due to CNI  s/p HepC treatment  Recent Cdiff 8/18  Hx of Ue DVT  hx of LVAD explant, NICM  anemia    Lung biopsy: 9/7 gram positive rods, prelim nocardia  CMV PCR 9/5 not detected  FOBT -  US: partial RIJ thrombus    euvolemic on exam, tmax 100.1  WBC 5.4, hb 8.6, tacro 8.2    Plan for piccline today or tomorrow; given nocardia species on culture will proceed with brain MRI  to rule out cns involvement    - picc line  - imipenem per ID  - I would favor a slow taper of po vanco for his c diff colitis  - per heme no indication for bm bx at this time  - immunosuppression  tacro target 8-10  off cellcept  prednisone 3mg   off valcyte  mepron for ppx Mr Crocker post heart transplant, complex course w multiple infections readmitted for infection    Active issues  PNA (high suspicion for nocardia)  Diarrhea  s/p OHT 2/23/18 (CMV -/+, toxo-/+, HCV viremic donor)  Chronic AMR s/p rituximab + IVIG+ PPx  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%  off cellcept due to infections  off valcyte due to leukopenia  RTA IV due to CNI  s/p HepC treatment  Recent Cdiff 8/18  Hx of Ue DVT  hx of LVAD explant, NICM  anemia    Lung biopsy: 9/7 gram positive rods, prelim nocardia  CMV PCR 9/5 not detected  FOBT -  US: partial RIJ thrombus    euvolemic on exam, tmax 100.1  WBC 5.4, hb 8.6, tacro 8.2    Plan for piccline today or tomorrow; given nocardia species on culture will proceed with brain MRI  to rule out cns involvement    - picc line  - brain MRI  - imipenem per ID  - I would favor a slow taper of po vanco for his c diff colitis  - per heme no indication for bm bx at this time  - immunosuppression  tacro target 8-10  off cellcept  prednisone 3mg   off valcyte  mepron for ppx

## 2018-09-12 NOTE — PROGRESS NOTE ADULT - ASSESSMENT
67 y/o male with PMH NICM HFrEF s/p HM2 LVAD 6/2017, who underwent heart transplant on 2/23/18 (CMV D-/R+ and Toxo D-/R+, Hep C+ heart) with post op graft dysfunction who underwent plasmapheresis and IVIG x2 as well as rituximab, with suspected fungal PNA diagnosed 6/2018  treated w voriconazole, recurrent PNA with new RUL infiltrate on CT scan 8/18, which  improved on broad spectrum antibiotics. S/p lung biopsy 8/18. C diff toxin positive -still  on oral vanco,.  Patient presents today for RHC and cardiac biopsy with c/o ongoing diarrhea (3-4 loose BM daily) and mildly productive cough, denies fevers, chills, abd pain, N/V. EKG demonstrated ST at 133bpm. Pt readmitted for further workup.  9/6 one formed BM this am; CMV study's pending, BC pending afebrile, albumin 500 cc for dehydration  potassium supplemented, transition to regular diet.   9/7 VVS; underwent IR procedure of Right lung mass FNA.  Tolerated procedure well.  Culture pending. Vanco IV D/C per ID. Continue with current medication regimen. Send Legionella urine culture and check voriconazole (T) in AM   Disposition: Home once medically cleared   9/8 + fever x1 101.7 as per ID cefepine decreased to 2g bid ivpb  9/9 VSS. 1 liquid/soft BM. Heme consulted for neutropenia possible bone marrow Bx. Deferring Bx at this time until anemia work up complete. Tacro 11.8. 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

## 2018-09-12 NOTE — PROGRESS NOTE ADULT - SUBJECTIVE AND OBJECTIVE BOX
Pt had one BM this am - states its slightly more formed.     MEDICATIONS:  aspirin enteric coated 81 milliGRAM(s) Oral daily    atovaquone Suspension 1500 milliGRAM(s) Oral daily  imipenem/cilastatin  IVPB 500 milliGRAM(s) IV Intermittent every 8 hours  metroNIDAZOLE  IVPB 500 milliGRAM(s) IV Intermittent every 8 hours  vancomycin    Solution 125 milliGRAM(s) Oral every 6 hours  voriconazole 200 milliGRAM(s) Oral every 12 hours    HYDROcodone/homatropine Syrup 5 milliLiter(s) Oral every 8 hours PRN    acetaminophen   Tablet .. 650 milliGRAM(s) Oral every 6 hours PRN    famotidine    Tablet 20 milliGRAM(s) Oral daily    dextrose 40% Gel 15 Gram(s) Oral once PRN  dextrose 50% Injectable 12.5 Gram(s) IV Push once  dextrose 50% Injectable 25 Gram(s) IV Push once  dextrose 50% Injectable 25 Gram(s) IV Push once  glucagon  Injectable 1 milliGRAM(s) IntraMuscular once PRN  insulin lispro (HumaLOG) corrective regimen sliding scale   SubCutaneous three times a day before meals  insulin lispro (HumaLOG) corrective regimen sliding scale   SubCutaneous at bedtime  pravastatin 20 milliGRAM(s) Oral at bedtime  predniSONE   Tablet 3 milliGRAM(s) Oral <User Schedule>    dextrose 5%. 1000 milliLiter(s) IV Continuous <Continuous>  lactated ringers. 1000 milliLiter(s) IV Continuous <Continuous>  multivitamin 1 Tablet(s) Oral daily  sodium bicarbonate 650 milliGRAM(s) Oral <User Schedule>  sodium chloride 0.9% lock flush 3 milliLiter(s) IV Push every 8 hours  sodium chloride 0.9%. 1000 milliLiter(s) IV Continuous <Continuous>  tacrolimus 0.5 milliGRAM(s) Oral <User Schedule>  tacrolimus    0.5 mG/mL Suspension 0.25 milliGRAM(s) Oral <User Schedule>    PHYSICAL EXAM:  T(C): 37.8 (09-12-18 @ 05:44), Max: 37.8 (09-12-18 @ 05:44)  HR: 129 (09-12-18 @ 05:44) (109 - 129)  BP: 122/84 (09-12-18 @ 05:44) (103/71 - 122/84)  RR: 18 (09-12-18 @ 05:44) (18 - 19)  SpO2: 93% (09-12-18 @ 05:44) (93% - 96%)  Wt(kg): --  I&O's Summary    11 Sep 2018 07:01  -  12 Sep 2018 07:00  --------------------------------------------------------  IN: 240 mL / OUT: 375 mL / NET: -135 mL        Appearance: Normal, NAD   HEENT:   Normal oral mucosa, PERRL, EOMI	  Lymphatic: No lymphadenopathy  Cardiovascular: Normal S1 S2, No JVD, No murmurs, No edema  Respiratory: Lungs clear to auscultation	  Psychiatry: A & O x 3, Mood & affect appropriate  Gastrointestinal:  Soft, Non-tender, + BS	  Skin: No rashes, No ecchymoses, No cyanosis	  Neurologic: Non-focal  Extremities: Normal range of motion, No clubbing, cyanosis or edema  Vascular: Peripheral pulses palpable bilaterally        LABS:	 	    CBC Full  -  ( 12 Sep 2018 07:43 )  WBC Count : 5.4 K/uL  Hemoglobin : 8.6 g/dL  Hematocrit : 26.7 %  Platelet Count - Automated : 473 K/uL  Mean Cell Volume : 88.5 fl  Mean Cell Hemoglobin : 28.5 pg  Mean Cell Hemoglobin Concentration : 32.2 gm/dL  Auto Neutrophil # : x  Auto Lymphocyte # : x  Auto Monocyte # : x  Auto Eosinophil # : x  Auto Basophil # : x  Auto Neutrophil % : x  Auto Lymphocyte % : x  Auto Monocyte % : x  Auto Eosinophil % : x  Auto Basophil % : x    09-12    136  |  102  |  22  ----------------------------<  98  5.2   |  20<L>  |  0.93  09-11    132<L>  |  100  |  28<H>  ----------------------------<  135<H>  3.6   |  21<L>  |  1.13    Ca    9.1      12 Sep 2018 07:43  Ca    9.5      11 Sep 2018 09:25

## 2018-09-12 NOTE — PROGRESS NOTE ADULT - SUBJECTIVE AND OBJECTIVE BOX
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   PICC line was placed by PICC team at bedside but CXR shows malpositioned tip of PICC line.   IR requested to exchange Right arm PICC line.    CXR from today reported "New right-sided PICC line distal tip coiled in the right subclavian vein."    PAST MEDICAL & SURGICAL HISTORY:  HLD (hyperlipidemia)  Former smoker  DVT of upper extremity (deep vein thrombosis)  Hepatitis C virus  GIB (gastrointestinal bleeding)  Ventricular fibrillation: s/p AICD  PAF (paroxysmal atrial fibrillation): on xarelto  Non-Ischemic Cardiomyopathy  SVT (Supraventricular Tachycardia)  HTN  CHF (Congestive Heart Failure)  S/P right heart catheterization: biopsy multiple  H/O heart transplant: 2/2018  Status post left hip replacement  History of Prior Ablation Treatment: for afib  AICD (Automatic Cardioverter/Defibrillator) Present: [employer] with 1 [employer] lead4/1/09- explanted and replaced with [employer] 2 leads on 9/2/09    Allergies  No Known Allergies    Labs:                        8.6    5.4   )-----------( 473      ( 12 Sep 2018 07:43 )             26.7     09-12    136  |  102  |  22  ----------------------------<  98  5.2   |  20<L>  |  0.93    Ca    9.1      12 Sep 2018 07:43    Activated Partial Thromboplastin Time in AM (09.05.18 @ 17:45)    Activated Partial Thromboplastin Time: 31.9  Prothrombin Time and INR, Plasma in AM (09.05.18 @ 17:45)    Prothrombin Time, Plasma: 16.4 sec    INR: 1.51    Culture - Blood (09.07.18 @ 08:31)    Specimen Source: .Blood Blood    Culture Results:   No growth at 5 days.    Culture - Blood (09.07.18 @ 08:31)    Specimen Source: .Blood Blood    Culture Results:   No growth at 5 days.      Regarding swelling in patient's right arm the patient stated he had this swelling in the Right arm previous to Right arm PICC line placement and pt denies pain, numbness, or tingling in right arm currently.  There is mild swelling noted in patient's right hand.  Case d/w IR attending Dr. Fuentes and will plan for Right arm PICC line exchange.  Case d/w CT surgery team.    After risks, benefits, alternatives discussion pt verbalized understanding and signed informed consent.   Will plan for Right arm PICC line exchange as above.    YESENIA Canchola  [facility] [id number]  Ext [phone]

## 2018-09-13 LAB
ANION GAP SERPL CALC-SCNC: 14 MMOL/L — SIGNIFICANT CHANGE UP (ref 5–17)
BUN SERPL-MCNC: 21 MG/DL — SIGNIFICANT CHANGE UP (ref 7–23)
CALCIUM SERPL-MCNC: 9.3 MG/DL — SIGNIFICANT CHANGE UP (ref 8.4–10.5)
CHLORIDE SERPL-SCNC: 103 MMOL/L — SIGNIFICANT CHANGE UP (ref 96–108)
CO2 SERPL-SCNC: 22 MMOL/L — SIGNIFICANT CHANGE UP (ref 22–31)
CREAT SERPL-MCNC: 0.99 MG/DL — SIGNIFICANT CHANGE UP (ref 0.5–1.3)
GLUCOSE BLDC GLUCOMTR-MCNC: 104 MG/DL — HIGH (ref 70–99)
GLUCOSE BLDC GLUCOMTR-MCNC: 118 MG/DL — HIGH (ref 70–99)
GLUCOSE BLDC GLUCOMTR-MCNC: 131 MG/DL — HIGH (ref 70–99)
GLUCOSE BLDC GLUCOMTR-MCNC: 88 MG/DL — SIGNIFICANT CHANGE UP (ref 70–99)
GLUCOSE SERPL-MCNC: 100 MG/DL — HIGH (ref 70–99)
HCT VFR BLD CALC: 27.7 % — LOW (ref 39–50)
HGB BLD-MCNC: 8.7 G/DL — LOW (ref 13–17)
MCHC RBC-ENTMCNC: 28.2 PG — SIGNIFICANT CHANGE UP (ref 27–34)
MCHC RBC-ENTMCNC: 31.6 GM/DL — LOW (ref 32–36)
MCV RBC AUTO: 89.2 FL — SIGNIFICANT CHANGE UP (ref 80–100)
PLATELET # BLD AUTO: 520 K/UL — HIGH (ref 150–400)
POTASSIUM SERPL-MCNC: 3.8 MMOL/L — SIGNIFICANT CHANGE UP (ref 3.5–5.3)
POTASSIUM SERPL-SCNC: 3.8 MMOL/L — SIGNIFICANT CHANGE UP (ref 3.5–5.3)
RBC # BLD: 3.1 M/UL — LOW (ref 4.2–5.8)
RBC # FLD: 20.6 % — HIGH (ref 10.3–14.5)
SODIUM SERPL-SCNC: 139 MMOL/L — SIGNIFICANT CHANGE UP (ref 135–145)
TACROLIMUS SERPL-MCNC: 8.8 NG/ML — SIGNIFICANT CHANGE UP
WBC # BLD: 5.7 K/UL — SIGNIFICANT CHANGE UP (ref 3.8–10.5)
WBC # FLD AUTO: 5.7 K/UL — SIGNIFICANT CHANGE UP (ref 3.8–10.5)

## 2018-09-13 PROCEDURE — 99231 SBSQ HOSP IP/OBS SF/LOW 25: CPT

## 2018-09-13 PROCEDURE — 71045 X-RAY EXAM CHEST 1 VIEW: CPT | Mod: 26

## 2018-09-13 PROCEDURE — 99233 SBSQ HOSP IP/OBS HIGH 50: CPT | Mod: GC

## 2018-09-13 PROCEDURE — 99232 SBSQ HOSP IP/OBS MODERATE 35: CPT | Mod: GC

## 2018-09-13 PROCEDURE — 70553 MRI BRAIN STEM W/O & W/DYE: CPT | Mod: 26

## 2018-09-13 RX ORDER — FUROSEMIDE 40 MG
20 TABLET ORAL ONCE
Qty: 0 | Refills: 0 | Status: COMPLETED | OUTPATIENT
Start: 2018-09-13 | End: 2018-09-13

## 2018-09-13 RX ORDER — VANCOMYCIN HCL 1 G
125 VIAL (EA) INTRAVENOUS EVERY 8 HOURS
Qty: 0 | Refills: 0 | Status: DISCONTINUED | OUTPATIENT
Start: 2018-09-13 | End: 2018-09-20

## 2018-09-13 RX ADMIN — VORICONAZOLE 200 MILLIGRAM(S): 10 INJECTION, POWDER, LYOPHILIZED, FOR SOLUTION INTRAVENOUS at 17:50

## 2018-09-13 RX ADMIN — SODIUM CHLORIDE 3 MILLILITER(S): 9 INJECTION INTRAMUSCULAR; INTRAVENOUS; SUBCUTANEOUS at 23:32

## 2018-09-13 RX ADMIN — SODIUM CHLORIDE 3 MILLILITER(S): 9 INJECTION INTRAMUSCULAR; INTRAVENOUS; SUBCUTANEOUS at 13:35

## 2018-09-13 RX ADMIN — TACROLIMUS 0.5 MILLIGRAM(S): 5 CAPSULE ORAL at 07:58

## 2018-09-13 RX ADMIN — VORICONAZOLE 200 MILLIGRAM(S): 10 INJECTION, POWDER, LYOPHILIZED, FOR SOLUTION INTRAVENOUS at 05:39

## 2018-09-13 RX ADMIN — Medication 650 MILLIGRAM(S): at 20:19

## 2018-09-13 RX ADMIN — Medication 650 MILLIGRAM(S): at 07:58

## 2018-09-13 RX ADMIN — Medication 125 MILLIGRAM(S): at 23:32

## 2018-09-13 RX ADMIN — SODIUM CHLORIDE 3 MILLILITER(S): 9 INJECTION INTRAMUSCULAR; INTRAVENOUS; SUBCUTANEOUS at 05:34

## 2018-09-13 RX ADMIN — ATOVAQUONE 1500 MILLIGRAM(S): 750 SUSPENSION ORAL at 13:23

## 2018-09-13 RX ADMIN — IMIPENEM AND CILASTATIN 100 MILLIGRAM(S): 250; 250 INJECTION, POWDER, FOR SOLUTION INTRAVENOUS at 13:24

## 2018-09-13 RX ADMIN — IMIPENEM AND CILASTATIN 100 MILLIGRAM(S): 250; 250 INJECTION, POWDER, FOR SOLUTION INTRAVENOUS at 05:39

## 2018-09-13 RX ADMIN — TACROLIMUS 0.25 MILLIGRAM(S): 5 CAPSULE ORAL at 20:35

## 2018-09-13 RX ADMIN — Medication 125 MILLIGRAM(S): at 05:39

## 2018-09-13 RX ADMIN — IMIPENEM AND CILASTATIN 100 MILLIGRAM(S): 250; 250 INJECTION, POWDER, FOR SOLUTION INTRAVENOUS at 23:31

## 2018-09-13 RX ADMIN — Medication 125 MILLIGRAM(S): at 15:03

## 2018-09-13 RX ADMIN — Medication 20 MILLIGRAM(S): at 13:23

## 2018-09-13 RX ADMIN — Medication 3 MILLIGRAM(S): at 07:58

## 2018-09-13 RX ADMIN — Medication 1 TABLET(S): at 13:23

## 2018-09-13 RX ADMIN — Medication 20 MILLIGRAM(S): at 23:31

## 2018-09-13 RX ADMIN — Medication 81 MILLIGRAM(S): at 13:23

## 2018-09-13 RX ADMIN — FAMOTIDINE 20 MILLIGRAM(S): 10 INJECTION INTRAVENOUS at 13:23

## 2018-09-13 NOTE — PROGRESS NOTE ADULT - PROBLEM SELECTOR PLAN 2
cont Tacrolimus dose  0.5 mg PO in AM/ 0.25 mg PO suspension in PM.  (goal 8-10, currently at goal at 8.2).  CellCept on hold due to infection.  Prednisone 3 mg po daily.    Antimicrobial prophylaxis:  Intermediate risk CMV - Valcyte initially held due to leukopenia. He is 6 months post-transplant so will monitor CMV PCR. - will repeat this week   Intermediate risk Toxo - continue Mepron 1500 mg po daily  PCP prophy - covered by Mepron.  Hep C+ - he completed a course of Mayvret with undetectable Hep C virus now. Will need follow-up increased risk donor testing at 12 months post-OHT.    Other prophylaxis:  Pepcid 20 mg po daily  ECASA 81 mg po daily  Citracal + D 2 tabs po BID  Pravachol 20 mg po QHS. cont Tacrolimus dose  0.5 mg PO in AM/ 0.25 mg PO suspension in PM.  (goal 8-10, currently at goal at 8.8).  CellCept on hold due to infection.  Prednisone 3 mg po daily.    Antimicrobial prophylaxis:  Intermediate risk CMV - Valcyte initially held due to leukopenia. He is 6 months post-transplant so will monitor CMV PCR. - will repeat in am   Intermediate risk Toxo - continue Mepron 1500 mg po daily  PCP prophy - covered by Mepron.  Hep C+ - he completed a course of Mayvret with undetectable Hep C virus now. Will need follow-up increased risk donor testing at 12 months post-OHT.    Other prophylaxis:  Pepcid 20 mg po daily  ECASA 81 mg po daily  Citracal + D 2 tabs po BID  Pravachol 20 mg po QHS.

## 2018-09-13 NOTE — PROGRESS NOTE ADULT - PROBLEM SELECTOR PLAN 3
s/p 9/7 Right lung FNA   ID following  as per ID Dr Lujan Imipenem/cilastin 500mg IV q8  on voriconazole 200 milliGRAM(s) Oral every 12 hours

## 2018-09-13 NOTE — PROGRESS NOTE ADULT - ASSESSMENT
69 y/o male with PMH NICM HFrEF s/p HM2 LVAD 6/2017, who underwent heart transplant on 2/23/18 (CMV D-/R+ and Toxo D-/R+, Hep C+ heart) with post op graft dysfunction who underwent plasmapheresis and IVIG x2 as well as rituximab, with suspected fungal PNA diagnosed 6/2018  treated w voriconazole, recurrent PNA with new RUL infiltrate on CT scan 8/18, which  improved on broad spectrum antibiotics. S/p lung biopsy 8/18. C diff toxin positive -still  on oral vanco,.  Patient presents today for RHC and cardiac biopsy with c/o ongoing diarrhea (3-4 loose BM daily) and mildly productive cough, denies fevers, chills, abd pain, N/V. EKG demonstrated ST at 133bpm. Pt readmitted for further workup.  9/6 one formed BM this am; CMV study's pending, BC pending afebrile, albumin 500 cc for dehydration  potassium supplemented, transition to regular diet.   9/7 VVS; underwent IR procedure of Right lung mass FNA.  Tolerated procedure well.  Culture pending. Vanco IV D/C per ID. Continue with current medication regimen. Send Legionella urine culture and check voriconazole (T) in AM   Disposition: Home once medically cleared   9/8 + fever x1 101.7 as per ID cefepine decreased to 2g bid ivpb  9/9 VSS. 1 liquid/soft BM. Heme consulted for neutropenia possible bone marrow Bx. Deferring Bx at this time until anemia work up complete. Tacro 11.8. 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

## 2018-09-13 NOTE — PROGRESS NOTE ADULT - PROBLEM SELECTOR PLAN 2
blood cultures  f/u   ID following, continue on PO Vanco  decreased  to q8h  no IV Flagyl   for C.diff

## 2018-09-13 NOTE — PROGRESS NOTE ADULT - SUBJECTIVE AND OBJECTIVE BOX
Follow Up:  Overnight no events. Had one episode of diarrhea yday. One today. No abd pain, n/v.    ROS:    All other systems negative    Constitutional: no fever, no chills  Head: no trauma  Eyes: no vision changes, no eye pain  ENT:  no sore throat, no rhinorrhea  Cardiovascular:  no chest pain, no palpitation  Respiratory:  no SOB, no cough  GI:  no abd pain, no vomiting,  diarrhea  urinary: no dysuria, no hematuria, no flank pain  musculoskeletal:  no joint pain, no joint swelling  skin:  no rash  neurology:  no headache, no seizure, no change in mental status  psych: no anxiety, no depression         Allergies  No Known Allergies        ANTIMICROBIALS:  atovaquone Suspension 1500 daily  imipenem/cilastatin  IVPB 500 every 8 hours  vancomycin    Solution 125 every 8 hours  voriconazole 200 every 12 hours      OTHER MEDS:  acetaminophen   Tablet .. 650 milliGRAM(s) Oral every 6 hours PRN  aspirin enteric coated 81 milliGRAM(s) Oral daily  Calcium citrate + vit d3 315mg/250 IU 2 Tablet(s) 2 Tablet(s) Oral two times a day  dextrose 40% Gel 15 Gram(s) Oral once PRN  dextrose 5%. 1000 milliLiter(s) IV Continuous <Continuous>  dextrose 50% Injectable 12.5 Gram(s) IV Push once  dextrose 50% Injectable 25 Gram(s) IV Push once  dextrose 50% Injectable 25 Gram(s) IV Push once  famotidine    Tablet 20 milliGRAM(s) Oral daily  glucagon  Injectable 1 milliGRAM(s) IntraMuscular once PRN  HYDROcodone/homatropine Syrup 5 milliLiter(s) Oral every 8 hours PRN  insulin lispro (HumaLOG) corrective regimen sliding scale   SubCutaneous three times a day before meals  insulin lispro (HumaLOG) corrective regimen sliding scale   SubCutaneous at bedtime  multivitamin 1 Tablet(s) Oral daily  pravastatin 20 milliGRAM(s) Oral at bedtime  predniSONE   Tablet 3 milliGRAM(s) Oral <User Schedule>  sodium bicarbonate 650 milliGRAM(s) Oral <User Schedule>  sodium chloride 0.9% lock flush 3 milliLiter(s) IV Push every 8 hours  tacrolimus 0.5 milliGRAM(s) Oral <User Schedule>  tacrolimus    0.5 mG/mL Suspension 0.25 milliGRAM(s) Oral <User Schedule>      Vital Signs Last 24 Hrs  T(C): 36.8 (13 Sep 2018 13:42), Max: 37.1 (13 Sep 2018 05:28)  T(F): 98.3 (13 Sep 2018 13:42), Max: 98.8 (13 Sep 2018 05:28)  HR: 114 (13 Sep 2018 13:42) (110 - 119)  BP: 99/70 (13 Sep 2018 13:42) (99/70 - 117/83)  BP(mean): --  RR: 18 (13 Sep 2018 13:42) (18 - 18)  SpO2: 93% (13 Sep 2018 13:42) (93% - 96%)    Physical Exam:  General:    NAD,  non toxic, A&O x 3  Head: atraumatic, normocephalic  Eye: normal sclera and conjunctiva  ENT:    no oropharyngeal lesions,   no LAD,   neck supple  Cardio:     regular S1, S2,  no murmur  Respiratory:    clear b/l,    no wheezing  abd:     soft,   BS +,   no tenderness,    no organomegaly  :   no CVAT,  no suprapubic tenderness,   no  diaz  Musculoskeletal:   RUE edema (improved from yday)  vascular: R arm PICC, normal pulses  Skin:    no rash  Neurologic:     no focal deficit  psych: normal affect, no suicidal ideation                          8.7    5.7   )-----------( 520      ( 13 Sep 2018 07:49 )             27.7       09-13    139  |  103  |  21  ----------------------------<  100<H>  3.8   |  22  |  0.99    Ca    9.3      13 Sep 2018 07:49            MICROBIOLOGY:  trisha Meyer  09-07-18   Moderate Rule Out Nocardia species  --  --      .Body Fluid Pleural Fluid  09-07-18   Moderate Rule Out Nocardia species  --    Upon re-evaluation of gram stain:  polymorphonuclear leukocytes seen per low power field  Gram Positive Rods  by cytocentrifuge  Previously reported as:  Moderate polymorphonuclear leukocytes per low power field  No organisms seen per oil power field      .Blood Blood  09-07-18   No growth at 5 days.  --  --      .Blood Blood-Peripheral  09-05-18   No growth at 5 days.  --  --      .Blood Blood-Venous  09-05-18   No growth at 5 days.  --  --      .Body Fluid Lung - Upper Lobe Right  08-17-18   Few Corynebacterium species  "Susceptibilities not performed"  --    polymorphonuclear leukocytes seen  No organisms seen  by cytocentrifuge      .Urine Clean Catch (Midstream)  08-15-18   No growth  --  --      .Blood Blood-Peripheral  08-14-18   No growth at 5 days.  --  --      .Abscess Arm - Right  08-14-18   No growth at 5 days  --  --        Toxoplasma IgG Screen: 4.7 IU/mL (06-14-18 @ 09:42)  HIV-1 RNA Quantitative, Viral Load Log: NOT DET. lg /mL (05-31-18 @ 19:10)  CMV IgG Antibody: 5.40 U/mL (05-25-18 @ 17:51)  CMV IgG Antibody: >10.00 U/mL (02-22-18 @ 23:45)  HIV-1 RNA Quantitative, Viral Load Log: NOT DET. lg /mL (02-02-18 @ 19:25)    Rapid RVP Result: NotDetec (09-07 @ 15:32)        RADIOLOGY:    MRI Head- No acute intracranial hemorrhage, acute ischemia, or abnormal   intracranial enhancement. No evidence of intracerebral abscess.    Multiple nonspecific foci of susceptibility artifact within the supra and   infratentorial structures of the brain for which the differential   diagnosis includes multiple foci of chronic microhemorrhage or cavernomas.    Additional multiple nonspecific abnormal white matter foci of T2/FLAIR   prolongation statistically favoring microvascular disease.

## 2018-09-13 NOTE — PROGRESS NOTE ADULT - ATTENDING COMMENTS
Patient seen and examined with DR. Fang  I agree with her interval history exam fidnings and plans as noted above    Patient clinically improving  lung biopsy remains suspicious for Nocardia- with final ID/sen. of the organism pending  Once  culture is final and if without fungal pathogens will discontinue the Voriconazole  cont. Mepron  Vanco taper for C.dif- while on Meropenem  CMV viral load weekly  HCV- in remission post treatment

## 2018-09-13 NOTE — PROGRESS NOTE ADULT - PROBLEM SELECTOR PLAN 1
Sepsis 2/2 PNA   - high suspicion for nocardia, will cont imipenem   - will f/u ID regarding treatment duration   - s/p PICC line   - underwent repeat biopsy of RUL Mass -  GP beaded rods  - s/p MRI this am to evaluate CNS involvement of nocardia - results pending Sepsis 2/2 PNA   - high suspicion for nocardia, will cont imipenem   - will f/u ID regarding treatment duration   - s/p PICC line   - underwent repeat biopsy of RUL Mass -  GP beaded rods  - s/p MRI this am to evaluate CNS involvement of nocardia - results pending  - remains on voriconazole - will discuss discontinuation with ID

## 2018-09-13 NOTE — PROGRESS NOTE ADULT - ATTENDING COMMENTS
Mr Crocker post heart transplant, complex course w multiple infections readmitted for infection    Active issues  PNA (high suspicion for nocardia)  Diarrhea  s/p OHT 2/23/18 (CMV -/+, toxo-/+, HCV viremic donor)  Chronic AMR s/p rituximab + IVIG+ PPx  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%  off cellcept due to infections  off valcyte due to leukopenia  RTA IV due to CNI  s/p HepC treatment  Recent Cdiff 8/18  Hx of Ue DVT  hx of LVAD explant, NICM  anemia    Lung biopsy: 9/7 gram positive rods, prelim nocardia  CMV PCR 9/5 not detected  FOBT -  US: partial RIJ thrombus    afebrile, single bowel movement more formed  PICC line placed on RUE, coiled but adjusted    On exam he has some mild LE edema; CXR looks unchanged, mildly congested from yesterday w a loculated R sided effusion with no interval change    - lasix 20mg iv once  - brain MRI was done today willfollow up on results  - imipenem per ID  - I would favor a slow taper of po vanco for his c diff colitis  - per heme no indication for bm bx at this time  - immunosuppression  tacro target 8-10, at goal today 8.8  off cellcept  prednisone 3mg   off valcyte  mepron for ppx

## 2018-09-13 NOTE — PROGRESS NOTE ADULT - PROBLEM SELECTOR PLAN 1
Continue tacrolimus, check daily tacro level at 0730  Continue prednisone 3mg daily, mepron 1500mg daily, voriconazole 200 BID.  Continue pepcid, calcium, MVI, sodium bicarb BID.  Continue asa and statin.  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

## 2018-09-13 NOTE — PROGRESS NOTE ADULT - PROBLEM SELECTOR PLAN 4
- recheck CBC and transfuse for H<7  - FOBT neg and labs not c/w hemolysis   - heme consult appreciated and no need for BM bx at this time HGb has been stable. No signs of active bleeding. FOBT was neg and labs not c/w hemolysis

## 2018-09-13 NOTE — PROGRESS NOTE ADULT - SUBJECTIVE AND OBJECTIVE BOX
Seen and examined. Resting comfortable. No acute events overnight, BMx1   VITAL SIGNS    Telemetry:  -120  Vital Signs Last 24 Hrs  T(C): 36.8 (18 @ 13:42), Max: 37.1 (18 @ 05:28)  T(F): 98.3 (18 @ 13:42), Max: 98.8 (18 @ 05:28)  HR: 114 (18 @ 13:42) (110 - 119)  BP: 99/70 (18 @ 13:42) (99/70 - 117/83)  RR: 18 (18 @ 13:42) (18 - 18)  SpO2: 93% (18 @ 13:42) (93% - 96%)             @ 07:01  -   @ 07:00  --------------------------------------------------------  IN: 120 mL / OUT: 1050 mL / NET: -930 mL     @ 07:01  -   @ 15:00  --------------------------------------------------------  IN: 180 mL / OUT: 0 mL / NET: 180 mL       Daily     Daily Weight in k.4 (13 Sep 2018 08:00)  Admit Wt: Drug Dosing Weight  Height (cm): 172.72 (05 Sep 2018 12:20)  Weight (kg): 70.8 (05 Sep 2018 12:20)  BMI (kg/m2): 23.7 (05 Sep 2018 12:20)  BSA (m2): 1.84 (05 Sep 2018 12:20)      CAPILLARY BLOOD GLUCOSE      POCT Blood Glucose.: 131 mg/dL (13 Sep 2018 11:37)  POCT Blood Glucose.: 104 mg/dL (13 Sep 2018 07:07)  POCT Blood Glucose.: 98 mg/dL (12 Sep 2018 21:18)  POCT Blood Glucose.: 142 mg/dL (12 Sep 2018 16:18)          MEDICATIONS  acetaminophen   Tablet .. 650 milliGRAM(s) Oral every 6 hours PRN  aspirin enteric coated 81 milliGRAM(s) Oral daily  atovaquone Suspension 1500 milliGRAM(s) Oral daily  Calcium citrate + vit d3 315mg/250 IU 2 Tablet(s) 2 Tablet(s) Oral two times a day  dextrose 40% Gel 15 Gram(s) Oral once PRN  dextrose 5%. 1000 milliLiter(s) IV Continuous <Continuous>  dextrose 50% Injectable 12.5 Gram(s) IV Push once  dextrose 50% Injectable 25 Gram(s) IV Push once  dextrose 50% Injectable 25 Gram(s) IV Push once  famotidine    Tablet 20 milliGRAM(s) Oral daily  glucagon  Injectable 1 milliGRAM(s) IntraMuscular once PRN  HYDROcodone/homatropine Syrup 5 milliLiter(s) Oral every 8 hours PRN  imipenem/cilastatin  IVPB 500 milliGRAM(s) IV Intermittent every 8 hours  insulin lispro (HumaLOG) corrective regimen sliding scale   SubCutaneous three times a day before meals  insulin lispro (HumaLOG) corrective regimen sliding scale   SubCutaneous at bedtime  multivitamin 1 Tablet(s) Oral daily  pravastatin 20 milliGRAM(s) Oral at bedtime  predniSONE   Tablet 3 milliGRAM(s) Oral <User Schedule>  sodium bicarbonate 650 milliGRAM(s) Oral <User Schedule>  sodium chloride 0.9% lock flush 3 milliLiter(s) IV Push every 8 hours  tacrolimus 0.5 milliGRAM(s) Oral <User Schedule>  tacrolimus    0.5 mG/mL Suspension 0.25 milliGRAM(s) Oral <User Schedule>  vancomycin    Solution 125 milliGRAM(s) Oral every 8 hours  voriconazole 200 milliGRAM(s) Oral every 12 hours      PHYSICAL EXAM    Subjective: " I am tired  Neurology: alert and oriented x 3, nonfocal, no gross deficits  CV : s1s1 reg no MRGS  Sternal Wound :  CDI , Stable healed   Lungs: CTA, equal chest expansion  Abdomen: soft, nontender, nondistended, positive bowel sounds, last bowel movement 13x1   :    voids  Extremities:  Rt groin bx site no hematoma, left wrist  + swelling,  no edema,  b/l groins no hematoma, distal pulses  b/l , no calf tenderness b/l , warm and perfused b/l    LABS      139  |  103  |  21  ----------------------------<  100<H>  3.8   |  22  |  0.99    Ca    9.3      13 Sep 2018 07:49                                   8.7    5.7   )-----------( 520      ( 13 Sep 2018 07:49 )             27.7                   PAST MEDICAL & SURGICAL HISTORY:  HLD (hyperlipidemia)  Former smoker  DVT of upper extremity (deep vein thrombosis)  Hepatitis C virus  GIB (gastrointestinal bleeding)  Ventricular fibrillation: s/p AICD  PAF (paroxysmal atrial fibrillation): on xarelto  Non-Ischemic Cardiomyopathy  SVT (Supraventricular Tachycardia)  HTN  CHF (Congestive Heart Failure)  S/P right heart catheterization: biopsy multiple  H/O heart transplant: 2018  Status post left hip replacement  History of Prior Ablation Treatment: for afib  AICD (Automatic Cardioverter/Defibrillator) Present: [employer] with 1 [employer] lead09- explanted and replaced with [employer] 2 leads on 09       Physical Therapy Rec:   Home  [x  ]   Home w/ PT  [  ]  Rehab  [  ]

## 2018-09-13 NOTE — PROGRESS NOTE ADULT - SUBJECTIVE AND OBJECTIVE BOX
Pt continues to have soft bowel movements. He had one this am and states he had one yesterday as well.     MEDICATIONS:  aspirin enteric coated 81 milliGRAM(s) Oral daily    atovaquone Suspension 1500 milliGRAM(s) Oral daily  imipenem/cilastatin  IVPB 500 milliGRAM(s) IV Intermittent every 8 hours  vancomycin    Solution 125 milliGRAM(s) Oral every 6 hours  voriconazole 200 milliGRAM(s) Oral every 12 hours    HYDROcodone/homatropine Syrup 5 milliLiter(s) Oral every 8 hours PRN    acetaminophen   Tablet .. 650 milliGRAM(s) Oral every 6 hours PRN    famotidine    Tablet 20 milliGRAM(s) Oral daily    dextrose 40% Gel 15 Gram(s) Oral once PRN  dextrose 50% Injectable 12.5 Gram(s) IV Push once  dextrose 50% Injectable 25 Gram(s) IV Push once  dextrose 50% Injectable 25 Gram(s) IV Push once  glucagon  Injectable 1 milliGRAM(s) IntraMuscular once PRN  insulin lispro (HumaLOG) corrective regimen sliding scale   SubCutaneous three times a day before meals  insulin lispro (HumaLOG) corrective regimen sliding scale   SubCutaneous at bedtime  pravastatin 20 milliGRAM(s) Oral at bedtime  predniSONE   Tablet 3 milliGRAM(s) Oral <User Schedule>    dextrose 5%. 1000 milliLiter(s) IV Continuous <Continuous>  multivitamin 1 Tablet(s) Oral daily  sodium bicarbonate 650 milliGRAM(s) Oral <User Schedule>  sodium chloride 0.9% lock flush 3 milliLiter(s) IV Push every 8 hours  tacrolimus 0.5 milliGRAM(s) Oral <User Schedule>  tacrolimus    0.5 mG/mL Suspension 0.25 milliGRAM(s) Oral <User Schedule>    PHYSICAL EXAM:  T(C): 37.1 (09-13-18 @ 05:28), Max: 37.1 (09-13-18 @ 05:28)  HR: 119 (09-13-18 @ 05:28) (110 - 119)  BP: 117/80 (09-13-18 @ 05:28) (105/66 - 117/83)  RR: 18 (09-13-18 @ 05:28) (18 - 18)  SpO2: 95% (09-13-18 @ 05:28) (94% - 96%)  Wt(kg): --  I&O's Summary    12 Sep 2018 07:01  -  13 Sep 2018 07:00  --------------------------------------------------------  IN: 120 mL / OUT: 1050 mL / NET: -930 mL    13 Sep 2018 07:01  -  13 Sep 2018 13:42  --------------------------------------------------------  IN: 60 mL / OUT: 0 mL / NET: 60 mL        Appearance: Normal, NAD   HEENT:   Normal oral mucosa, PERRL, EOMI	  Lymphatic: No lymphadenopathy  Cardiovascular: Normal S1 S2, JVD 8, No murmurs, trace bilateral LE edema  Respiratory: Lungs clear to auscultation	  Psychiatry: A & O x 3, Mood & affect appropriate  Gastrointestinal:  Soft, Non-tender, + BS	  Skin: No rashes, No ecchymoses, No cyanosis	  Neurologic: Non-focal  Extremities: Normal range of motion, No clubbing, cyanosis or edema  R wrist with swelling, min warm, non-tender   Vascular: Peripheral pulses palpable bilaterally        LABS:	 	    CBC Full  -  ( 13 Sep 2018 07:49 )  WBC Count : 5.7 K/uL  Hemoglobin : 8.7 g/dL  Hematocrit : 27.7 %  Platelet Count - Automated : 520 K/uL  Mean Cell Volume : 89.2 fl  Mean Cell Hemoglobin : 28.2 pg  Mean Cell Hemoglobin Concentration : 31.6 gm/dL  Auto Neutrophil # : x  Auto Lymphocyte # : x  Auto Monocyte # : x  Auto Eosinophil # : x  Auto Basophil # : x  Auto Neutrophil % : x  Auto Lymphocyte % : x  Auto Monocyte % : x  Auto Eosinophil % : x  Auto Basophil % : x    09-13    139  |  103  |  21  ----------------------------<  100<H>  3.8   |  22  |  0.99  09-12    136  |  102  |  22  ----------------------------<  98  5.2   |  20<L>  |  0.93    Ca    9.3      13 Sep 2018 07:49  Ca    9.1      12 Sep 2018 07:43

## 2018-09-13 NOTE — PROGRESS NOTE ADULT - ASSESSMENT
69 yo M s/p heart transplant complicated by rejection with rounds of plasmaphoresis , IVIG, rituximab x2, known colonizer with  Pseudomonas last treated for PNA in June 2018  and again in August 14-30 with cefepime and voriconazole as well as c.diff with PO vanco now presents with decreased PO intake, 4 watery BM daily and a productive cough. Pt had CT guided R lung mass bx on 8/17 which showed acute inflammation but was negative bacterial or fungal etiology. Concerned now for cdiff colitis vs CMV colitis.     last admission labs:   Fungitell negative  Galactomannan negative  Crypto Ag negative  Quant indeterminate, however lung biopsy w negative AFB stain  Pathology report from 8/17 R lung bx showing inflammation w negative AFB and GMS stain  CT guided biopsy cultures are NGTD- only grew a corynebacterium that is likely not a pathogen  Spoke with the micro. lab: pleural biopsy tissue with gram positive beaded rods- maldi-tof unable to identify. The lab is awaiting further growht of the organism for repeat testing  ? Actino/nocardia  He is responding again to C.diff treatment; switched from cefepime to imipenem for concern of nocardia    Hep C neg (8/22)    fungitell neg  galactomannan neg     Suggest:  -Continue PO Vancomycin for cdiff  -s/p IV flagyl  -Stool for GI PCR- negative  -CMV PCR- negative  -F/U blood cultures - NGTD  -BAL now changed to ?nocardia- possible cefepime is partially treating nocardia and that's why pt improving in btwn hospital visits vs pseudomonal pna- changed cefepime to imipenem  -Monitor CrCl while on abx  -Continue Mepron 1500mg daily  -Continue Voriconazole 200mg q12hrs - F/U voriconazole level  -Monitor Tacrolimus level  - Pt s/p IR biopsy-specimen sent for culture, fungal, AFB, and nocardia and pathology--> AFB negative

## 2018-09-13 NOTE — PROGRESS NOTE ADULT - PROBLEM SELECTOR PLAN 3
- Continue PO vanc 125mg [id number]  - Continue flagyl 500mg IV Q8 hours Given pt is having 1 BM per day flagyl stopped   vanco decreased to 125 po tid - will opt for a slow taper

## 2018-09-14 LAB
ANION GAP SERPL CALC-SCNC: 13 MMOL/L — SIGNIFICANT CHANGE UP (ref 5–17)
BUN SERPL-MCNC: 22 MG/DL — SIGNIFICANT CHANGE UP (ref 7–23)
CALCIUM SERPL-MCNC: 9 MG/DL — SIGNIFICANT CHANGE UP (ref 8.4–10.5)
CHLORIDE SERPL-SCNC: 104 MMOL/L — SIGNIFICANT CHANGE UP (ref 96–108)
CMV DNA CSF QL NAA+PROBE: SIGNIFICANT CHANGE UP
CMV DNA SPEC NAA+PROBE-LOG#: SIGNIFICANT CHANGE UP LOGIU/ML
CO2 SERPL-SCNC: 22 MMOL/L — SIGNIFICANT CHANGE UP (ref 22–31)
CREAT SERPL-MCNC: 0.98 MG/DL — SIGNIFICANT CHANGE UP (ref 0.5–1.3)
GLUCOSE BLDC GLUCOMTR-MCNC: 100 MG/DL — HIGH (ref 70–99)
GLUCOSE BLDC GLUCOMTR-MCNC: 106 MG/DL — HIGH (ref 70–99)
GLUCOSE BLDC GLUCOMTR-MCNC: 88 MG/DL — SIGNIFICANT CHANGE UP (ref 70–99)
GLUCOSE SERPL-MCNC: 101 MG/DL — HIGH (ref 70–99)
HCT VFR BLD CALC: 25 % — LOW (ref 39–50)
HGB BLD-MCNC: 8 G/DL — LOW (ref 13–17)
MAGNESIUM SERPL-MCNC: 1.2 MG/DL — LOW (ref 1.6–2.6)
MCHC RBC-ENTMCNC: 28.2 PG — SIGNIFICANT CHANGE UP (ref 27–34)
MCHC RBC-ENTMCNC: 31.8 GM/DL — LOW (ref 32–36)
MCV RBC AUTO: 88.7 FL — SIGNIFICANT CHANGE UP (ref 80–100)
PLATELET # BLD AUTO: 492 K/UL — HIGH (ref 150–400)
POTASSIUM SERPL-MCNC: 4.3 MMOL/L — SIGNIFICANT CHANGE UP (ref 3.5–5.3)
POTASSIUM SERPL-SCNC: 4.3 MMOL/L — SIGNIFICANT CHANGE UP (ref 3.5–5.3)
RBC # BLD: 2.82 M/UL — LOW (ref 4.2–5.8)
RBC # FLD: 20.8 % — HIGH (ref 10.3–14.5)
SODIUM SERPL-SCNC: 139 MMOL/L — SIGNIFICANT CHANGE UP (ref 135–145)
TACROLIMUS SERPL-MCNC: 8.6 NG/ML — SIGNIFICANT CHANGE UP
WBC # BLD: 5 K/UL — SIGNIFICANT CHANGE UP (ref 3.8–10.5)
WBC # FLD AUTO: 5 K/UL — SIGNIFICANT CHANGE UP (ref 3.8–10.5)

## 2018-09-14 PROCEDURE — 99232 SBSQ HOSP IP/OBS MODERATE 35: CPT | Mod: GC

## 2018-09-14 PROCEDURE — 99233 SBSQ HOSP IP/OBS HIGH 50: CPT | Mod: GC

## 2018-09-14 PROCEDURE — 99231 SBSQ HOSP IP/OBS SF/LOW 25: CPT

## 2018-09-14 PROCEDURE — 71045 X-RAY EXAM CHEST 1 VIEW: CPT | Mod: 26

## 2018-09-14 RX ORDER — TACROLIMUS 5 MG/1
2 CAPSULE ORAL
Qty: 0 | Refills: 0 | Status: DISCONTINUED | OUTPATIENT
Start: 2018-09-15 | End: 2018-09-17

## 2018-09-14 RX ORDER — TACROLIMUS 5 MG/1
0.25 CAPSULE ORAL
Qty: 0 | Refills: 0 | Status: COMPLETED | OUTPATIENT
Start: 2018-09-14 | End: 2018-09-14

## 2018-09-14 RX ORDER — MAGNESIUM SULFATE 500 MG/ML
2 VIAL (ML) INJECTION ONCE
Qty: 0 | Refills: 0 | Status: COMPLETED | OUTPATIENT
Start: 2018-09-14 | End: 2018-09-14

## 2018-09-14 RX ADMIN — Medication 1 TABLET(S): at 14:14

## 2018-09-14 RX ADMIN — IMIPENEM AND CILASTATIN 100 MILLIGRAM(S): 250; 250 INJECTION, POWDER, FOR SOLUTION INTRAVENOUS at 14:15

## 2018-09-14 RX ADMIN — VORICONAZOLE 200 MILLIGRAM(S): 10 INJECTION, POWDER, LYOPHILIZED, FOR SOLUTION INTRAVENOUS at 06:57

## 2018-09-14 RX ADMIN — Medication 125 MILLIGRAM(S): at 06:57

## 2018-09-14 RX ADMIN — ATOVAQUONE 1500 MILLIGRAM(S): 750 SUSPENSION ORAL at 14:14

## 2018-09-14 RX ADMIN — Medication 3 MILLIGRAM(S): at 08:19

## 2018-09-14 RX ADMIN — Medication 20 MILLIGRAM(S): at 22:48

## 2018-09-14 RX ADMIN — IMIPENEM AND CILASTATIN 100 MILLIGRAM(S): 250; 250 INJECTION, POWDER, FOR SOLUTION INTRAVENOUS at 07:29

## 2018-09-14 RX ADMIN — SODIUM CHLORIDE 3 MILLILITER(S): 9 INJECTION INTRAMUSCULAR; INTRAVENOUS; SUBCUTANEOUS at 22:43

## 2018-09-14 RX ADMIN — TACROLIMUS 0.5 MILLIGRAM(S): 5 CAPSULE ORAL at 08:19

## 2018-09-14 RX ADMIN — Medication 81 MILLIGRAM(S): at 14:16

## 2018-09-14 RX ADMIN — TACROLIMUS 0.25 MILLIGRAM(S): 5 CAPSULE ORAL at 20:03

## 2018-09-14 RX ADMIN — Medication 650 MILLIGRAM(S): at 08:19

## 2018-09-14 RX ADMIN — VORICONAZOLE 200 MILLIGRAM(S): 10 INJECTION, POWDER, LYOPHILIZED, FOR SOLUTION INTRAVENOUS at 17:11

## 2018-09-14 RX ADMIN — SODIUM CHLORIDE 3 MILLILITER(S): 9 INJECTION INTRAMUSCULAR; INTRAVENOUS; SUBCUTANEOUS at 14:16

## 2018-09-14 RX ADMIN — Medication 125 MILLIGRAM(S): at 22:48

## 2018-09-14 RX ADMIN — SODIUM CHLORIDE 3 MILLILITER(S): 9 INJECTION INTRAMUSCULAR; INTRAVENOUS; SUBCUTANEOUS at 06:57

## 2018-09-14 RX ADMIN — Medication 125 MILLIGRAM(S): at 14:15

## 2018-09-14 RX ADMIN — IMIPENEM AND CILASTATIN 100 MILLIGRAM(S): 250; 250 INJECTION, POWDER, FOR SOLUTION INTRAVENOUS at 22:48

## 2018-09-14 RX ADMIN — Medication 650 MILLIGRAM(S): at 20:02

## 2018-09-14 RX ADMIN — FAMOTIDINE 20 MILLIGRAM(S): 10 INJECTION INTRAVENOUS at 14:15

## 2018-09-14 RX ADMIN — Medication 50 GRAM(S): at 17:11

## 2018-09-14 NOTE — PROGRESS NOTE ADULT - SUBJECTIVE AND OBJECTIVE BOX
Interval History:  mri brain negative for infections    Medications:  acetaminophen   Tablet .. 650 milliGRAM(s) Oral every 6 hours PRN  aspirin enteric coated 81 milliGRAM(s) Oral daily  atovaquone Suspension 1500 milliGRAM(s) Oral daily  Calcium citrate + vit d3 315mg/250 IU 2 Tablet(s) 2 Tablet(s) Oral two times a day  dextrose 40% Gel 15 Gram(s) Oral once PRN  dextrose 5%. 1000 milliLiter(s) IV Continuous <Continuous>  dextrose 50% Injectable 12.5 Gram(s) IV Push once  dextrose 50% Injectable 25 Gram(s) IV Push once  dextrose 50% Injectable 25 Gram(s) IV Push once  famotidine    Tablet 20 milliGRAM(s) Oral daily  glucagon  Injectable 1 milliGRAM(s) IntraMuscular once PRN  HYDROcodone/homatropine Syrup 5 milliLiter(s) Oral every 8 hours PRN  imipenem/cilastatin  IVPB 500 milliGRAM(s) IV Intermittent every 8 hours  insulin lispro (HumaLOG) corrective regimen sliding scale   SubCutaneous three times a day before meals  insulin lispro (HumaLOG) corrective regimen sliding scale   SubCutaneous at bedtime  multivitamin 1 Tablet(s) Oral daily  pravastatin 20 milliGRAM(s) Oral at bedtime  predniSONE   Tablet 3 milliGRAM(s) Oral <User Schedule>  sodium bicarbonate 650 milliGRAM(s) Oral <User Schedule>  sodium chloride 0.9% lock flush 3 milliLiter(s) IV Push every 8 hours  tacrolimus 0.5 milliGRAM(s) Oral <User Schedule>  tacrolimus    0.5 mG/mL Suspension 0.25 milliGRAM(s) Oral <User Schedule>  vancomycin    Solution 125 milliGRAM(s) Oral every 8 hours  voriconazole 200 milliGRAM(s) Oral every 12 hours    Vitals:  T(C): 37 (18 @ 04:46), Max: 37 (18 @ 04:46)  HR: 118 (18 @ 04:46) (114 - 118)  BP: 124/86 (18 @ 04:46) (99/70 - 124/86)    RR: 18 (18 @ 04:46) (18 - 18)  SpO2: 95% (18 @ 04:46) (93% - 95%)    Daily     Daily Weight in k.9 (14 Sep 2018 08:50)        I&O's Summary    13 Sep 2018 07:01  -  14 Sep 2018 07:00  --------------------------------------------------------  IN: 1100 mL / OUT: 390 mL / NET: 710 mL        Physical Exam:  Appearance: No Acute Distress  HEENT: no jvp  Cardiovascular: RRR, Normal S1 S2, No murmurs/rubs/gallops  Respiratory: Clear to auscultation bilaterally  Gastrointestinal: Soft, Non-tender, non-distended	  Skin: no skin lesions  Neurologic: Non-focal  Extremities: No LE edema, warm and well perfused  Psychiatry: A & O x 3, Mood & affect appropriate      Labs:                        8.0    5.0   )-----------( 492      ( 14 Sep 2018 07:40 )             25.0         139  |  104  |  22  ----------------------------<  101<H>  4.3   |  22  |  0.98    Ca    9.0      14 Sep 2018 07:40  Mg     1.2

## 2018-09-14 NOTE — PROGRESS NOTE ADULT - PROBLEM SELECTOR PLAN 3
Given pt is having 1 BM per day flagyl off   vanco decreased to 125 po tid - will opt for a slow taper

## 2018-09-14 NOTE — PROGRESS NOTE ADULT - ASSESSMENT
67 yo M s/p heart transplant complicated by rejection with rounds of plasmaphoresis , IVIG, rituximab x2, known colonizer with  Pseudomonas last treated for PNA in June 2018  and again in August 14-30 with cefepime and voriconazole as well as c.diff with PO vanco now presents with decreased PO intake, 4 watery BM daily and a productive cough. Pt had CT guided R lung mass bx on 8/17 which showed acute inflammation but was negative bacterial or fungal etiology. Concerned now for cdiff colitis vs CMV colitis.     last admission labs:   Fungitell negative  Galactomannan negative  Crypto Ag negative  Quant indeterminate, however lung biopsy w negative AFB stain  Pathology report from 8/17 R lung bx showing inflammation w negative AFB and GMS stain  CT guided biopsy cultures are NGTD- only grew a corynebacterium that is likely not a pathogen  Spoke with the micro. lab: pleural biopsy tissue with gram positive beaded rods- maldi-tof unable to identify. The lab is awaiting further growht of the organism for repeat testing  ? Actino/nocardia  He is responding again to C.diff treatment; switched from cefepime to imipenem for concern of nocardia    Hep C neg (8/22)    fungitell neg  galactomannan neg   -Stool for GI PCR- negative  -CMV PCR- negative    Suggest:  -Continue PO Vancomycin for cdiff- on taper  -F/U blood cultures - NGTD  -BAL now changed to ?nocardia- possible cefepime is partially treating nocardia and that's why pt improving in btwn hospital visits vs pseudomonal pna- changed cefepime to imipenem  -Monitor CrCl while on abx  -Continue Mepron 1500mg daily  -Continue Voriconazole 200mg q12hrs   -Monitor Tacrolimus level  - Pt s/p IR biopsy-specimen sent for culture, fungal, AFB, and nocardia and pathology--> AFB negative, awaiting other cultures

## 2018-09-14 NOTE — PROGRESS NOTE ADULT - ASSESSMENT
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    awaiting final speciation of biopsy; he is otherwise euvolemic, feels weak and is ambulating very little    - please have PT walk with him  - imipenem via picc line  - vanco po taper  - immunosuppression  tacro target 8-10, at goal today 8.6  off cellcept  prednisone taper per protocol (will check w pharmacy)  off valcyte  mepron for ppx

## 2018-09-14 NOTE — PROGRESS NOTE ADULT - ASSESSMENT
67 y/o M with history of NICM now s/p heart transplantation on 2/23/18. 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 He has received therapy with IVIG, plamsapheresis, and rituximab. RHC 9/5 shows low normal filing pressures and high normal output. His presentation with low grade fevers, malaise, and persistent diarrhea concerning for recurrent infection, despite treatment for presumed fungal pneumonia and c.diff. Last CMV negative on 9/5. Enlargement of RUL opacity noted on chest CT compared with that from 8/27, LLL opacity stable. Mild rectal wall thickening noted, ongoing diarrhea but improving.

## 2018-09-14 NOTE — PROGRESS NOTE ADULT - SUBJECTIVE AND OBJECTIVE BOX
Follow Up:  Overnight no events. Pt still w one episode of diarrhea daily.     ROS:    All other systems negative    Constitutional: no fever, no chills  Head: no trauma  Eyes: no vision changes, no eye pain  ENT:  no sore throat, no rhinorrhea  Cardiovascular:  no chest pain, no palpitation  Respiratory:  no SOB, no cough  GI:  no abd pain, no vomiting,  diarrhea  urinary: no dysuria, no hematuria, no flank pain  musculoskeletal:  no joint pain, no joint swelling  skin:  no rash  neurology:  no headache, no seizure, no change in mental status  psych: no anxiety, no depression         Allergies  No Known Allergies        ANTIMICROBIALS:  atovaquone Suspension 1500 daily  imipenem/cilastatin  IVPB 500 every 8 hours  vancomycin    Solution 125 every 8 hours  voriconazole 200 every 12 hours      OTHER MEDS:  acetaminophen   Tablet .. 650 milliGRAM(s) Oral every 6 hours PRN  aspirin enteric coated 81 milliGRAM(s) Oral daily  Calcium citrate + vit d3 315mg/250 IU 2 Tablet(s) 2 Tablet(s) Oral two times a day  dextrose 40% Gel 15 Gram(s) Oral once PRN  dextrose 5%. 1000 milliLiter(s) IV Continuous <Continuous>  dextrose 50% Injectable 12.5 Gram(s) IV Push once  dextrose 50% Injectable 25 Gram(s) IV Push once  dextrose 50% Injectable 25 Gram(s) IV Push once  famotidine    Tablet 20 milliGRAM(s) Oral daily  glucagon  Injectable 1 milliGRAM(s) IntraMuscular once PRN  HYDROcodone/homatropine Syrup 5 milliLiter(s) Oral every 8 hours PRN  insulin lispro (HumaLOG) corrective regimen sliding scale   SubCutaneous three times a day before meals  insulin lispro (HumaLOG) corrective regimen sliding scale   SubCutaneous at bedtime  multivitamin 1 Tablet(s) Oral daily  pravastatin 20 milliGRAM(s) Oral at bedtime  predniSONE   Tablet 3 milliGRAM(s) Oral <User Schedule>  sodium bicarbonate 650 milliGRAM(s) Oral <User Schedule>  sodium chloride 0.9% lock flush 3 milliLiter(s) IV Push every 8 hours  tacrolimus 0.5 milliGRAM(s) Oral <User Schedule>  tacrolimus    0.5 mG/mL Suspension 0.25 milliGRAM(s) Oral <User Schedule>      Vital Signs Last 24 Hrs  T(C): 37 (14 Sep 2018 04:46), Max: 37 (14 Sep 2018 04:46)  T(F): 98.6 (14 Sep 2018 04:46), Max: 98.6 (14 Sep 2018 04:46)  HR: 118 (14 Sep 2018 04:46) (114 - 118)  BP: 124/86 (14 Sep 2018 04:46) (99/70 - 124/86)  BP(mean): --  RR: 18 (14 Sep 2018 04:46) (18 - 18)  SpO2: 95% (14 Sep 2018 04:46) (93% - 95%)    Physical Exam:  General:    NAD,  non toxic, A&O x 3  Head: atraumatic, normocephalic  Eye: normal sclera and conjunctiva  ENT:    no oropharyngeal lesions,   no LAD,   neck supple  Cardio:     regular S1, S2  Respiratory:    clear b/l,    no wheezing  abd:     soft,   BS +,   no tenderness,    no organomegaly  :   no CVAT,  no suprapubic tenderness  Musculoskeletal:   R arm swelling improving  vascular: no lines, normal pulses  Skin:    no rash  Neurologic:     no focal deficit  psych: normal affect, no suicidal ideation                          8.0    5.0   )-----------( 492      ( 14 Sep 2018 07:40 )             25.0       09-14    139  |  104  |  22  ----------------------------<  101<H>  4.3   |  22  |  0.98    Ca    9.0      14 Sep 2018 07:40  Mg     1.2     09-14            MICROBIOLOGY:  v  .Broncial  09-07-18   Moderate Rule Out Nocardia species  --  --      .Body Fluid Pleural Fluid  09-07-18   Moderate Rule Out Nocardia species  --    Upon re-evaluation of gram stain:  polymorphonuclear leukocytes seen per low power field  Gram Positive Rods  by cytocentrifuge  Previously reported as:  Moderate polymorphonuclear leukocytes per low power field  No organisms seen per oil power field      .Blood Blood  09-07-18   No growth at 5 days.  --  --      .Blood Blood-Peripheral  09-05-18   No growth at 5 days.  --  --      .Blood Blood-Venous  09-05-18   No growth at 5 days.  --  --      .Body Fluid Lung - Upper Lobe Right  08-17-18   Few Corynebacterium species  "Susceptibilities not performed"  --    polymorphonuclear leukocytes seen  No organisms seen  by cytocentrifuge        Toxoplasma IgG Screen: 4.7 IU/mL (06-14-18 @ 09:42)  HIV-1 RNA Quantitative, Viral Load Log: NOT DET. lg /mL (05-31-18 @ 19:10)  CMV IgG Antibody: 5.40 U/mL (05-25-18 @ 17:51)  CMV IgG Antibody: >10.00 U/mL (02-22-18 @ 23:45)  HIV-1 RNA Quantitative, Viral Load Log: NOT DET. lg /mL (02-02-18 @ 19:25)    Rapid RVP Result: NotDetec (09-07 @ 15:32)        RADIOLOGY:  No new imaging

## 2018-09-14 NOTE — PROGRESS NOTE ADULT - PROBLEM SELECTOR PLAN 1
Continue tacrolimus, check daily tacro level at 0730  Continue prednisone 3mg daily, mepron 1500mg daily, voriconazole 200 BID.  Continue pepcid, calcium, MVI, sodium bicarb BID.  Continue asa and statin.  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

## 2018-09-14 NOTE — PROGRESS NOTE ADULT - PROBLEM SELECTOR PLAN 5
- Continuing on voriconizole 200mg Q12 - Continuing on voriconizole 200mg Q12 until finalization of bx results

## 2018-09-14 NOTE — PROGRESS NOTE ADULT - PROBLEM SELECTOR PLAN 2
cont Tacrolimus dose  0.5 mg PO in AM/ 0.25 mg PO suspension in PM.  (goal 8-10, currently at goal at 8.6).  CellCept on hold due to infection.  Prednisone 3 mg po daily.    Antimicrobial prophylaxis:  Intermediate risk CMV - Valcyte initially held due to leukopenia. He is 6 months post-transplant so will monitor CMV PCR. - will repeat in am   Intermediate risk Toxo - continue Mepron 1500 mg po daily  PCP prophy - covered by Mepron.  Hep C+ - he completed a course of Mayvret with undetectable Hep C virus now. Will need follow-up increased risk donor testing at 12 months post-OHT.    Other prophylaxis:  Pepcid 20 mg po daily  ECASA 81 mg po daily  Citracal + D 2 tabs po BID  Pravachol 20 mg po QHS.

## 2018-09-14 NOTE — PROGRESS NOTE ADULT - SUBJECTIVE AND OBJECTIVE BOX
Seen and examined. Resting comfortable. No acute events overnight  VITAL SIGNS    Telemetry:  ST 120s  Vital Signs Last 24 Hrs  T(C): 37 (18 @ 13:54), Max: 37 (18 @ 04:46)  T(F): 98.6 (18 @ 13:54), Max: 98.6 (18 @ 04:46)  HR: 118 (18 @ 13:54) (118 - 118)  BP: 100/69 (18 @ 13:54) (100/69 - 124/86)  RR: 18 (18 @ 13:54) (18 - 18)  SpO2: 96% (18 @ 13:54) (95% - 96%)             @ 07:01  -   @ 07:00  --------------------------------------------------------  IN: 1100 mL / OUT: 390 mL / NET: 710 mL       Daily     Daily Weight in k.9 (14 Sep 2018 08:50)  Admit Wt: Drug Dosing Weight  Height (cm): 172.72 (05 Sep 2018 12:20)  Weight (kg): 70.8 (05 Sep 2018 12:20)  BMI (kg/m2): 23.7 (05 Sep 2018 12:20)  BSA (m2): 1.84 (05 Sep 2018 12:20)      CAPILLARY BLOOD GLUCOSE      POCT Blood Glucose.: 106 mg/dL (14 Sep 2018 11:46)  POCT Blood Glucose.: 100 mg/dL (14 Sep 2018 07:28)  POCT Blood Glucose.: 118 mg/dL (13 Sep 2018 22:06)  POCT Blood Glucose.: 88 mg/dL (13 Sep 2018 18:41)          MEDICATIONS  acetaminophen   Tablet .. 650 milliGRAM(s) Oral every 6 hours PRN  aspirin enteric coated 81 milliGRAM(s) Oral daily  Calcium citrate + vit d3 315mg/250 IU 2 Tablet(s) 2 Tablet(s) Oral two times a day  dextrose 40% Gel 15 Gram(s) Oral once PRN  dextrose 5%. 1000 milliLiter(s) IV Continuous <Continuous>  dextrose 50% Injectable 12.5 Gram(s) IV Push once  dextrose 50% Injectable 25 Gram(s) IV Push once  dextrose 50% Injectable 25 Gram(s) IV Push once  famotidine    Tablet 20 milliGRAM(s) Oral daily  glucagon  Injectable 1 milliGRAM(s) IntraMuscular once PRN  HYDROcodone/homatropine Syrup 5 milliLiter(s) Oral every 8 hours PRN  imipenem/cilastatin  IVPB 500 milliGRAM(s) IV Intermittent every 8 hours  insulin lispro (HumaLOG) corrective regimen sliding scale   SubCutaneous three times a day before meals  insulin lispro (HumaLOG) corrective regimen sliding scale   SubCutaneous at bedtime  magnesium sulfate  IVPB 2 Gram(s) IV Intermittent once  multivitamin 1 Tablet(s) Oral daily  pravastatin 20 milliGRAM(s) Oral at bedtime  sodium bicarbonate 650 milliGRAM(s) Oral <User Schedule>  sodium chloride 0.9% lock flush 3 milliLiter(s) IV Push every 8 hours  tacrolimus 0.5 milliGRAM(s) Oral <User Schedule>  tacrolimus    0.5 mG/mL Suspension 0.25 milliGRAM(s) Oral <User Schedule>  vancomycin    Solution 125 milliGRAM(s) Oral every 8 hours  voriconazole 200 milliGRAM(s) Oral every 12 hours      PHYSICAL EXAM    Subjective: " I feel tired"   Neurology: alert and oriented x 3, nonfocal, no gross deficits  CV : s1s1 reg no MRGS + JVP  Sternal Wound :  CDI , Stable healed  Lungs: CTA, equal chest expansion  Abdomen: soft, nontender, nondistended, positive bowel sounds, last bowel movement   :    voids  Extremities:  Rt groin bx site CDI  + 2 BLLEedema,    b/l groins no hematoma, distal pulses  b/l , no calf tenderness b/l , warm and perfused b/l    LABS      139  |  104  |  22  ----------------------------<  101<H>  4.3   |  22  |  0.98    Ca    9.0      14 Sep 2018 07:40  Mg     1.2                                        8.0    5.0   )-----------( 492      ( 14 Sep 2018 07:40 )             25.0                   PAST MEDICAL & SURGICAL HISTORY:  HLD (hyperlipidemia)  Former smoker  DVT of upper extremity (deep vein thrombosis)  Hepatitis C virus  GIB (gastrointestinal bleeding)  Ventricular fibrillation: s/p AICD  PAF (paroxysmal atrial fibrillation): on xarelto  Non-Ischemic Cardiomyopathy  SVT (Supraventricular Tachycardia)  HTN  CHF (Congestive Heart Failure)  S/P right heart catheterization: biopsy multiple  H/O heart transplant: 2018  Status post left hip replacement  History of Prior Ablation Treatment: for afib  AICD (Automatic Cardioverter/Defibrillator) Present: [employer] with 1 [employer] lead09- explanted and replaced with [employer] 2 leads on 09       Physical Therapy Rec:   Home  [  ]   Home w/ PT  [  x]  Rehab  [  ]

## 2018-09-14 NOTE — PROGRESS NOTE ADULT - ATTENDING COMMENTS
Patient seen and examined with Dr. Fang  I agree with her interval history, exam findings and plans as noted above    Patient's lung biopsy with preliminary growth of Nocardia  Continue Imipenem  Will plan to discontinue the Voriconazole (no fungus isolated)- Tacrolimus levels will need to be adjusted  Will also plan to change the Mepron to Bactrim    C.diff diarrhea on Vanco taper- bowel movt. once a day / stable    Gary Lujan MD  [phone]  After 5pm/weekends [phone]

## 2018-09-14 NOTE — PROGRESS NOTE ADULT - PROBLEM SELECTOR PLAN 1
Sepsis 2/2 PNA   - high suspicion for nocardia, will cont imipenem   - will f/u ID regarding treatment duration   - s/p PICC line   - underwent repeat biopsy of RUL Mass -  GP beaded rods - awaiting speciation  - s/p MRI without CNS involvement of nocardia - changes c/w microvascular dz   - remains on voriconazole - ID input appreciated - will wait for finalization of bx results

## 2018-09-14 NOTE — PROGRESS NOTE ADULT - SUBJECTIVE AND OBJECTIVE BOX
Pt again with one BM yesterday and 1BM this am - soft. Denies CP, SOB. Walked to door and back - did not ambulate in hallway yet.     MEDICATIONS:  aspirin enteric coated 81 milliGRAM(s) Oral daily    atovaquone Suspension 1500 milliGRAM(s) Oral daily  imipenem/cilastatin  IVPB 500 milliGRAM(s) IV Intermittent every 8 hours  vancomycin    Solution 125 milliGRAM(s) Oral every 8 hours  voriconazole 200 milliGRAM(s) Oral every 12 hours    HYDROcodone/homatropine Syrup 5 milliLiter(s) Oral every 8 hours PRN    acetaminophen   Tablet .. 650 milliGRAM(s) Oral every 6 hours PRN    famotidine    Tablet 20 milliGRAM(s) Oral daily    dextrose 40% Gel 15 Gram(s) Oral once PRN  dextrose 50% Injectable 12.5 Gram(s) IV Push once  dextrose 50% Injectable 25 Gram(s) IV Push once  dextrose 50% Injectable 25 Gram(s) IV Push once  glucagon  Injectable 1 milliGRAM(s) IntraMuscular once PRN  insulin lispro (HumaLOG) corrective regimen sliding scale   SubCutaneous three times a day before meals  insulin lispro (HumaLOG) corrective regimen sliding scale   SubCutaneous at bedtime  pravastatin 20 milliGRAM(s) Oral at bedtime  predniSONE   Tablet 3 milliGRAM(s) Oral <User Schedule>    dextrose 5%. 1000 milliLiter(s) IV Continuous <Continuous>  multivitamin 1 Tablet(s) Oral daily  sodium bicarbonate 650 milliGRAM(s) Oral <User Schedule>  sodium chloride 0.9% lock flush 3 milliLiter(s) IV Push every 8 hours  tacrolimus 0.5 milliGRAM(s) Oral <User Schedule>  tacrolimus    0.5 mG/mL Suspension 0.25 milliGRAM(s) Oral <User Schedule>      PHYSICAL EXAM:  T(C): 37 (09-14-18 @ 04:46), Max: 37 (09-14-18 @ 04:46)  HR: 118 (09-14-18 @ 04:46) (114 - 118)  BP: 124/86 (09-14-18 @ 04:46) (99/70 - 124/86)  RR: 18 (09-14-18 @ 04:46) (18 - 18)  SpO2: 95% (09-14-18 @ 04:46) (93% - 95%)  Wt(kg): --  I&O's Summary    13 Sep 2018 07:01  -  14 Sep 2018 07:00  --------------------------------------------------------  IN: 1100 mL / OUT: 390 mL / NET: 710 mL      Appearance: Normal, NAD   HEENT:   Normal oral mucosa, PERRL, EOMI	  Lymphatic: No lymphadenopathy  Cardiovascular: Normal S1 S2, JVD 8, No murmurs, trace bilateral LE edema  Respiratory: Lungs clear to auscultation	  Psychiatry: A & O x 3, Mood & affect appropriate  Gastrointestinal:  Soft, Non-tender, + BS	  Skin: No rashes, No ecchymoses, No cyanosis	  Neurologic: Non-focal  Extremities: Normal range of motion, No clubbing, cyanosis or edema  R wrist with swelling, min warm, non-tender   Vascular: Peripheral pulses palpable bilaterally      LABS:	 	    CBC Full  -  ( 14 Sep 2018 07:40 )  WBC Count : 5.0 K/uL  Hemoglobin : 8.0 g/dL  Hematocrit : 25.0 %  Platelet Count - Automated : 492 K/uL  Mean Cell Volume : 88.7 fl  Mean Cell Hemoglobin : 28.2 pg  Mean Cell Hemoglobin Concentration : 31.8 gm/dL  Auto Neutrophil # : x  Auto Lymphocyte # : x  Auto Monocyte # : x  Auto Eosinophil # : x  Auto Basophil # : x  Auto Neutrophil % : x  Auto Lymphocyte % : x  Auto Monocyte % : x  Auto Eosinophil % : x  Auto Basophil % : x    09-14    139  |  104  |  22  ----------------------------<  101<H>  4.3   |  22  |  0.98  09-13    139  |  103  |  21  ----------------------------<  100<H>  3.8   |  22  |  0.99    Ca    9.0      14 Sep 2018 07:40  Ca    9.3      13 Sep 2018 07:49  Mg     1.2     09-14      < from: MR Head w/wo IV Cont (09.13.18 @ 10:35) >  EXAM:  MR BRAIN WAW IC                            PROCEDURE DATE:  09/13/2018            INTERPRETATION:  .    CLINICAL INFORMATION: Status post transplant. Known nocardia infection.   Evaluate for abscess.    TECHNIQUE: Multiplanar multisequentialMRI of the brain was acquired with   and without the administration of IV gadolinium. 7.5 cc's of IV Gadavist   was administered for the purposes of this examination. 0 cc was discarded.    COMPARISON: Prior CT examination of the head from 8/21/2017.No prior MRI   examinations of the brain are available for comparison.    FINDINGS: Multiple nonspecific foci of T2/FLAIR hyperintensity are noted   throughout the deep and periventricular white matter of the cerebral   hemispheres. There is no associated mass effect. There is no evidence of   acute ischemia on the diffusion-weighted images. There is no abnormal   brain parenchymal or leptomeningeal enhancement.    Multiple nonspecific punctate foci of susceptibility artifact are noted   throughout the supratentorial and infratentorial compartments. There are   incidental developmental venous anomalies traversing through the right   parietal and left frontal lobes.    There is diffuse cerebral volume loss with prominence of the sulci,   fissures, and cisternal spaces which is normal for the patient's age.   Ventricular size and configuration is unremarkable. Flow-voids are noted   throughout the major intracranial vessels, on the T2 weighted images,   consistent with their patency. The sellar region and posterior fossa   appear unremarkable.    The paranasal sinuses and mastoid air cells are clear. Calvarial signal   is within normal limits. The orbits appear unremarkable.    IMPRESSION: No acute intracranial hemorrhage, acute ischemia, or abnormal   intracranial enhancement. No evidence of intracerebral abscess.    Multiple nonspecific foci of susceptibility artifact within the supra and   infratentorial structures of the brain for which the differential   diagnosis includes multiple foci of chronic microhemorrhage or cavernomas.    Additional multiple nonspecific abnormal white matter foci of T2/FLAIR   prolongation statistically favoring microvascular disease.                    KULDEEP CORTEZ M.D., ATTENDING RADIOLOGIST  This document has been electronically signed. Sep 13 2018  1:11PM    < end of copied text >

## 2018-09-15 LAB
ANION GAP SERPL CALC-SCNC: 11 MMOL/L — SIGNIFICANT CHANGE UP (ref 5–17)
BUN SERPL-MCNC: 21 MG/DL — SIGNIFICANT CHANGE UP (ref 7–23)
CALCIUM SERPL-MCNC: 9.3 MG/DL — SIGNIFICANT CHANGE UP (ref 8.4–10.5)
CHLORIDE SERPL-SCNC: 101 MMOL/L — SIGNIFICANT CHANGE UP (ref 96–108)
CO2 SERPL-SCNC: 23 MMOL/L — SIGNIFICANT CHANGE UP (ref 22–31)
CREAT SERPL-MCNC: 1.05 MG/DL — SIGNIFICANT CHANGE UP (ref 0.5–1.3)
GLUCOSE BLDC GLUCOMTR-MCNC: 107 MG/DL — HIGH (ref 70–99)
GLUCOSE BLDC GLUCOMTR-MCNC: 110 MG/DL — HIGH (ref 70–99)
GLUCOSE BLDC GLUCOMTR-MCNC: 111 MG/DL — HIGH (ref 70–99)
GLUCOSE BLDC GLUCOMTR-MCNC: 96 MG/DL — SIGNIFICANT CHANGE UP (ref 70–99)
GLUCOSE BLDC GLUCOMTR-MCNC: 98 MG/DL — SIGNIFICANT CHANGE UP (ref 70–99)
GLUCOSE SERPL-MCNC: 122 MG/DL — HIGH (ref 70–99)
HCT VFR BLD CALC: 27.3 % — LOW (ref 39–50)
HGB BLD-MCNC: 8.6 G/DL — LOW (ref 13–17)
MAGNESIUM SERPL-MCNC: 1.6 MG/DL — SIGNIFICANT CHANGE UP (ref 1.6–2.6)
MCHC RBC-ENTMCNC: 27.6 PG — SIGNIFICANT CHANGE UP (ref 27–34)
MCHC RBC-ENTMCNC: 31.4 GM/DL — LOW (ref 32–36)
MCV RBC AUTO: 88 FL — SIGNIFICANT CHANGE UP (ref 80–100)
PLATELET # BLD AUTO: 544 K/UL — HIGH (ref 150–400)
POTASSIUM SERPL-MCNC: 3.7 MMOL/L — SIGNIFICANT CHANGE UP (ref 3.5–5.3)
POTASSIUM SERPL-SCNC: 3.7 MMOL/L — SIGNIFICANT CHANGE UP (ref 3.5–5.3)
RBC # BLD: 3.1 M/UL — LOW (ref 4.2–5.8)
RBC # FLD: 20.9 % — HIGH (ref 10.3–14.5)
SODIUM SERPL-SCNC: 135 MMOL/L — SIGNIFICANT CHANGE UP (ref 135–145)
TACROLIMUS SERPL-MCNC: 10.6 NG/ML — SIGNIFICANT CHANGE UP
WBC # BLD: 5.5 K/UL — SIGNIFICANT CHANGE UP (ref 3.8–10.5)
WBC # FLD AUTO: 5.5 K/UL — SIGNIFICANT CHANGE UP (ref 3.8–10.5)

## 2018-09-15 PROCEDURE — 99231 SBSQ HOSP IP/OBS SF/LOW 25: CPT

## 2018-09-15 PROCEDURE — 99233 SBSQ HOSP IP/OBS HIGH 50: CPT | Mod: GC

## 2018-09-15 PROCEDURE — 71045 X-RAY EXAM CHEST 1 VIEW: CPT | Mod: 26

## 2018-09-15 RX ORDER — MAGNESIUM OXIDE 400 MG ORAL TABLET 241.3 MG
400 TABLET ORAL
Qty: 0 | Refills: 0 | Status: DISCONTINUED | OUTPATIENT
Start: 2018-09-15 | End: 2018-09-26

## 2018-09-15 RX ADMIN — Medication 125 MILLIGRAM(S): at 05:19

## 2018-09-15 RX ADMIN — FAMOTIDINE 20 MILLIGRAM(S): 10 INJECTION INTRAVENOUS at 12:23

## 2018-09-15 RX ADMIN — SODIUM CHLORIDE 3 MILLILITER(S): 9 INJECTION INTRAMUSCULAR; INTRAVENOUS; SUBCUTANEOUS at 05:16

## 2018-09-15 RX ADMIN — Medication 1 TABLET(S): at 12:23

## 2018-09-15 RX ADMIN — Medication 81 MILLIGRAM(S): at 12:26

## 2018-09-15 RX ADMIN — SODIUM CHLORIDE 3 MILLILITER(S): 9 INJECTION INTRAMUSCULAR; INTRAVENOUS; SUBCUTANEOUS at 16:07

## 2018-09-15 RX ADMIN — Medication 125 MILLIGRAM(S): at 22:38

## 2018-09-15 RX ADMIN — MAGNESIUM OXIDE 400 MG ORAL TABLET 400 MILLIGRAM(S): 241.3 TABLET ORAL at 12:24

## 2018-09-15 RX ADMIN — Medication 2 MILLIGRAM(S): at 08:09

## 2018-09-15 RX ADMIN — IMIPENEM AND CILASTATIN 100 MILLIGRAM(S): 250; 250 INJECTION, POWDER, FOR SOLUTION INTRAVENOUS at 22:37

## 2018-09-15 RX ADMIN — IMIPENEM AND CILASTATIN 100 MILLIGRAM(S): 250; 250 INJECTION, POWDER, FOR SOLUTION INTRAVENOUS at 14:37

## 2018-09-15 RX ADMIN — TACROLIMUS 2 MILLIGRAM(S): 5 CAPSULE ORAL at 08:09

## 2018-09-15 RX ADMIN — IMIPENEM AND CILASTATIN 100 MILLIGRAM(S): 250; 250 INJECTION, POWDER, FOR SOLUTION INTRAVENOUS at 05:19

## 2018-09-15 RX ADMIN — Medication 20 MILLIGRAM(S): at 20:04

## 2018-09-15 RX ADMIN — Medication 125 MILLIGRAM(S): at 14:47

## 2018-09-15 RX ADMIN — TACROLIMUS 2 MILLIGRAM(S): 5 CAPSULE ORAL at 20:03

## 2018-09-15 RX ADMIN — MAGNESIUM OXIDE 400 MG ORAL TABLET 400 MILLIGRAM(S): 241.3 TABLET ORAL at 20:03

## 2018-09-15 RX ADMIN — Medication 650 MILLIGRAM(S): at 20:04

## 2018-09-15 RX ADMIN — SODIUM CHLORIDE 3 MILLILITER(S): 9 INJECTION INTRAMUSCULAR; INTRAVENOUS; SUBCUTANEOUS at 20:05

## 2018-09-15 RX ADMIN — Medication 650 MILLIGRAM(S): at 08:09

## 2018-09-15 NOTE — PROGRESS NOTE ADULT - SUBJECTIVE AND OBJECTIVE BOX
Seen and examined. Resting comfortable. No acute events overnight  VITAL SIGNS    Telemetry:  -120  Vital Signs Last 24 Hrs  T(C): 36.7 (09-15-18 @ 05:32), Max: 37 (18 @ 13:54)  T(F): 98.1 (09-15-18 @ 05:32), Max: 98.6 (18 @ 13:54)  HR: 126 (09-15-18 @ 05:32) (102 - 126)  BP: 118/86 (09-15-18 @ 05:32) (100/69 - 118/86)  RR: 17 (09-15-18 @ 05:32) (17 - 18)  SpO2: 93% (09-15-18 @ 05:32) (93% - 96%)             @ 07:01  -  09-15 @ 07:00  --------------------------------------------------------  IN: 720 mL / OUT: 1102 mL / NET: -382 mL       Daily     Daily Weight in k.3 (15 Sep 2018 07:25)  Admit Wt: Drug Dosing Weight  Height (cm): 172.72 (05 Sep 2018 12:20)  Weight (kg): 70.8 (05 Sep 2018 12:20)  BMI (kg/m2): 23.7 (05 Sep 2018 12:20)  BSA (m2): 1.84 (05 Sep 2018 12:20)      CAPILLARY BLOOD GLUCOSE      POCT Blood Glucose.: 110 mg/dL (15 Sep 2018 07:22)  POCT Blood Glucose.: 88 mg/dL (14 Sep 2018 21:44)  POCT Blood Glucose.: 106 mg/dL (14 Sep 2018 11:46)          MEDICATIONS  acetaminophen   Tablet .. 650 milliGRAM(s) Oral every 6 hours PRN  aspirin enteric coated 81 milliGRAM(s) Oral daily  Calcium citrate + vit d3 315mg/250 IU 2 Tablet(s) 2 Tablet(s) Oral two times a day  dextrose 40% Gel 15 Gram(s) Oral once PRN  dextrose 5%. 1000 milliLiter(s) IV Continuous <Continuous>  dextrose 50% Injectable 12.5 Gram(s) IV Push once  dextrose 50% Injectable 25 Gram(s) IV Push once  dextrose 50% Injectable 25 Gram(s) IV Push once  famotidine    Tablet 20 milliGRAM(s) Oral daily  glucagon  Injectable 1 milliGRAM(s) IntraMuscular once PRN  HYDROcodone/homatropine Syrup 5 milliLiter(s) Oral every 8 hours PRN  imipenem/cilastatin  IVPB 500 milliGRAM(s) IV Intermittent every 8 hours  insulin lispro (HumaLOG) corrective regimen sliding scale   SubCutaneous three times a day before meals  insulin lispro (HumaLOG) corrective regimen sliding scale   SubCutaneous at bedtime  magnesium oxide 400 milliGRAM(s) Oral three times a day with meals  multivitamin 1 Tablet(s) Oral daily  pravastatin 20 milliGRAM(s) Oral at bedtime  predniSONE   Tablet 2 milliGRAM(s) Oral <User Schedule>  sodium bicarbonate 650 milliGRAM(s) Oral <User Schedule>  sodium chloride 0.9% lock flush 3 milliLiter(s) IV Push every 8 hours  tacrolimus 2 milliGRAM(s) Oral <User Schedule>  trimethoprim   80 mG/sulfamethoxazole 400 mG 1 Tablet(s) Oral daily  vancomycin    Solution 125 milliGRAM(s) Oral every 8 hours      PHYSICAL EXAM    Subjective: co complaints   Neurology: alert and oriented x 3, nonfocal, no gross deficits  CV : s1s1 reg no MRGS  Sternal Wound :  CDI , Stable healed   Lungs: CTA, equal chest expansion  Abdomen: soft, nontender, nondistended, positive bowel sounds, last bowel movement 9/15 x1 soft  :   voids  Extremities:   RT groin CDI  + trace BLLE edema,    b/l groins no hematoma, distal pulses  b/l , no calf tenderness b/l , warm and perfused b/l    LABS  09-15    135  |  101  |  21  ----------------------------<  122<H>  3.7   |  23  |  1.05    Ca    9.3      15 Sep 2018 08:20  Mg     1.6     09-15                                   8.6    5.5   )-----------( 544      ( 15 Sep 2018 08:20 )             27.3                   PAST MEDICAL & SURGICAL HISTORY:  HLD (hyperlipidemia)  Former smoker  DVT of upper extremity (deep vein thrombosis)  Hepatitis C virus  GIB (gastrointestinal bleeding)  Ventricular fibrillation: s/p AICD  PAF (paroxysmal atrial fibrillation): on xarelto  Non-Ischemic Cardiomyopathy  SVT (Supraventricular Tachycardia)  HTN  CHF (Congestive Heart Failure)  S/P right heart catheterization: biopsy multiple  H/O heart transplant: 2018  Status post left hip replacement  History of Prior Ablation Treatment: for afib  AICD (Automatic Cardioverter/Defibrillator) Present: [employer] with 1 [employer] lead09- explanted and replaced with [employer] 2 leads on 09       Physical Therapy Rec:   Home  [  ]   Home w/ PT  [x  ]  Rehab  [  ] Seen and examined. Resting comfortable. No acute events overnight  VITAL SIGNS    Telemetry:  -120  Vital Signs Last 24 Hrs  T(C): 36.7 (09-15-18 @ 05:32), Max: 37 (18 @ 13:54)  T(F): 98.1 (09-15-18 @ 05:32), Max: 98.6 (18 @ 13:54)  HR: 126 (09-15-18 @ 05:32) (102 - 126)  BP: 118/86 (09-15-18 @ 05:32) (100/69 - 118/86)  RR: 17 (09-15-18 @ 05:32) (17 - 18)  SpO2: 93% (09-15-18 @ 05:32) (93% - 96%)             @ 07:01  -  09-15 @ 07:00  --------------------------------------------------------  IN: 720 mL / OUT: 1102 mL / NET: -382 mL       Daily     Daily Weight in k.3 (15 Sep 2018 07:25)  Admit Wt: Drug Dosing Weight  Height (cm): 172.72 (05 Sep 2018 12:20)  Weight (kg): 70.8 (05 Sep 2018 12:20)  BMI (kg/m2): 23.7 (05 Sep 2018 12:20)  BSA (m2): 1.84 (05 Sep 2018 12:20)      CAPILLARY BLOOD GLUCOSE      POCT Blood Glucose.: 110 mg/dL (15 Sep 2018 07:22)  POCT Blood Glucose.: 88 mg/dL (14 Sep 2018 21:44)  POCT Blood Glucose.: 106 mg/dL (14 Sep 2018 11:46)          MEDICATIONS  acetaminophen   Tablet .. 650 milliGRAM(s) Oral every 6 hours PRN  aspirin enteric coated 81 milliGRAM(s) Oral daily  Calcium citrate + vit d3 315mg/250 IU 2 Tablet(s) 2 Tablet(s) Oral two times a day  dextrose 40% Gel 15 Gram(s) Oral once PRN  dextrose 5%. 1000 milliLiter(s) IV Continuous <Continuous>  dextrose 50% Injectable 12.5 Gram(s) IV Push once  dextrose 50% Injectable 25 Gram(s) IV Push once  dextrose 50% Injectable 25 Gram(s) IV Push once  famotidine    Tablet 20 milliGRAM(s) Oral daily  glucagon  Injectable 1 milliGRAM(s) IntraMuscular once PRN  HYDROcodone/homatropine Syrup 5 milliLiter(s) Oral every 8 hours PRN  imipenem/cilastatin  IVPB 500 milliGRAM(s) IV Intermittent every 8 hours  insulin lispro (HumaLOG) corrective regimen sliding scale   SubCutaneous three times a day before meals  insulin lispro (HumaLOG) corrective regimen sliding scale   SubCutaneous at bedtime  magnesium oxide 400 milliGRAM(s) Oral three times a day with meals  multivitamin 1 Tablet(s) Oral daily  pravastatin 20 milliGRAM(s) Oral at bedtime  predniSONE   Tablet 2 milliGRAM(s) Oral <User Schedule>  sodium bicarbonate 650 milliGRAM(s) Oral <User Schedule>  sodium chloride 0.9% lock flush 3 milliLiter(s) IV Push every 8 hours  tacrolimus 2 milliGRAM(s) Oral <User Schedule>  trimethoprim   80 mG/sulfamethoxazole 400 mG 1 Tablet(s) Oral daily  vancomycin    Solution 125 milliGRAM(s) Oral every 8 hours      PHYSICAL EXAM    Subjective: co complaints   Neurology: alert and oriented x 3, nonfocal, no gross deficits  CV : s1s1 reg no MRGS  Sternal Wound :  CDI , Stable healed   Lungs: CTA, equal chest expansion  Abdomen: soft, nontender, nondistended, positive bowel sounds, last bowel movement 9/15 x1 soft  :   voids  Extremities:  RT upper arm PICC  RT groin CDI  + trace BLLE edema,    b/l groins no hematoma, distal pulses  b/l , no calf tenderness b/l , warm and perfused b/l    LABS  09-15    135  |  101  |  21  ----------------------------<  122<H>  3.7   |  23  |  1.05    Ca    9.3      15 Sep 2018 08:20  Mg     1.6     09-15                                   8.6    5.5   )-----------( 544      ( 15 Sep 2018 08:20 )             27.3                   PAST MEDICAL & SURGICAL HISTORY:  HLD (hyperlipidemia)  Former smoker  DVT of upper extremity (deep vein thrombosis)  Hepatitis C virus  GIB (gastrointestinal bleeding)  Ventricular fibrillation: s/p AICD  PAF (paroxysmal atrial fibrillation): on xarelto  Non-Ischemic Cardiomyopathy  SVT (Supraventricular Tachycardia)  HTN  CHF (Congestive Heart Failure)  S/P right heart catheterization: biopsy multiple  H/O heart transplant: 2018  Status post left hip replacement  History of Prior Ablation Treatment: for afib  AICD (Automatic Cardioverter/Defibrillator) Present: [employer] with 1 [employer] lead09- explanted and replaced with [employer] 2 leads on 09       Physical Therapy Rec:   Home  [  ]   Home w/ PT  [x  ]  Rehab  [  ]

## 2018-09-15 NOTE — PROGRESS NOTE ADULT - ASSESSMENT
67 y/o male with PMH NICM HFrEF s/p HM2 LVAD 6/2017, who underwent heart transplant on 2/23/18 (CMV D-/R+ and Toxo D-/R+, Hep C+ heart) with post op graft dysfunction who underwent plasmapheresis and IVIG x2 as well as rituximab, with suspected fungal PNA diagnosed 6/2018  treated w voriconazole, recurrent PNA with new RUL infiltrate on CT scan 8/18, which  improved on broad spectrum antibiotics. S/p lung biopsy 8/18. C diff toxin positive -still  on oral vanco,.  Patient presents today for RHC and cardiac biopsy with c/o ongoing diarrhea (3-4 loose BM daily) and mildly productive cough, denies fevers, chills, abd pain, N/V. EKG demonstrated ST at 133bpm. Pt readmitted for further workup.  9/6 one formed BM this am; CMV study's pending, BC pending afebrile, albumin 500 cc for dehydration  potassium supplemented, transition to regular diet.   9/7 VVS; underwent IR procedure of Right lung mass FNA.  Tolerated procedure well.  Culture pending. Vanco IV D/C per ID. Continue with current medication regimen. Send Legionella urine culture and check voriconazole (T) in AM   Disposition: Home once medically cleared   9/8 + fever x1 101.7 as per ID cefepine decreased to 2g bid ivpb  9/9 VSS. 1 liquid/soft BM. Heme consulted for neutropenia possible bone marrow Bx. Deferring Bx at this time until anemia work up complete. Tacro 11.8. F/U HF recs  9/10 VSS 1loos liquid BM needs Bone marrow biopsy Tacro11.5  Hycodan for cough  9/11 VSS B/L UE Duplex for  assessment  will need PICC long term abx - found to have Nocardia?- MRI brain  to r/o abcess  9/12 PICC placed xray pending  MRI for today   ABX per ID mipenem/cilastin 500mg IV q8  9/13 MRI brain done pending results , vanco po decreased to q8h lasix 20mg ivx1   9/14 no active issues  cmv neg asperg neg

## 2018-09-15 NOTE — PROGRESS NOTE ADULT - SUBJECTIVE AND OBJECTIVE BOX
CC:  Acute Decompensated Heart failure  24H hour events:  No acute events, the patient feels well, improved since admission.    MEDICATIONS:  aspirin enteric coated 81 milliGRAM(s) Oral daily    imipenem/cilastatin  IVPB 500 milliGRAM(s) IV Intermittent every 8 hours  trimethoprim   80 mG/sulfamethoxazole 400 mG 1 Tablet(s) Oral daily  vancomycin    Solution 125 milliGRAM(s) Oral every 8 hours    HYDROcodone/homatropine Syrup 5 milliLiter(s) Oral every 8 hours PRN    acetaminophen   Tablet .. 650 milliGRAM(s) Oral every 6 hours PRN    famotidine    Tablet 20 milliGRAM(s) Oral daily    dextrose 40% Gel 15 Gram(s) Oral once PRN  dextrose 50% Injectable 12.5 Gram(s) IV Push once  dextrose 50% Injectable 25 Gram(s) IV Push once  dextrose 50% Injectable 25 Gram(s) IV Push once  glucagon  Injectable 1 milliGRAM(s) IntraMuscular once PRN  insulin lispro (HumaLOG) corrective regimen sliding scale   SubCutaneous three times a day before meals  insulin lispro (HumaLOG) corrective regimen sliding scale   SubCutaneous at bedtime  pravastatin 20 milliGRAM(s) Oral at bedtime  predniSONE   Tablet 2 milliGRAM(s) Oral <User Schedule>    dextrose 5%. 1000 milliLiter(s) IV Continuous <Continuous>  multivitamin 1 Tablet(s) Oral daily  sodium bicarbonate 650 milliGRAM(s) Oral <User Schedule>  sodium chloride 0.9% lock flush 3 milliLiter(s) IV Push every 8 hours  tacrolimus 2 milliGRAM(s) Oral <User Schedule>        PHYSICAL EXAM:  T(C): 36.7 (09-15-18 @ 05:32), Max: 37 (09-14-18 @ 13:54)  HR: 126 (09-15-18 @ 05:32) (102 - 126)  BP: 118/86 (09-15-18 @ 05:32) (100/69 - 118/86)  RR: 17 (09-15-18 @ 05:32) (17 - 18)  SpO2: 93% (09-15-18 @ 05:32) (93% - 96%)  I&O's Summary    14 Sep 2018 07:01  -  15 Sep 2018 07:00  --------------------------------------------------------  IN: 720 mL / OUT: 1102 mL / NET: -382 mL        Appearance: Normal	  HEENT:   Normal oral mucosa  Lymphatic: No lymphadenopathy  Cardiovascular: Normal S1 S2,         No JVD,      No murmurs,      No edema  Respiratory: trace bibasilar crackles  Psychiatry: Mood & affect appropriate  Gastrointestinal:  Soft, Non-tender	  Skin: No rashes, No ecchymoses, No cyanosis	  Neurologic: Non-focal  Extremities: Normal range of motion, No clubbing, cyanosis   Vascular: warm extremities        LABS:	 	    CBC Full  -  ( 14 Sep 2018 07:40 )  WBC Count : 5.0 K/uL  Hemoglobin : 8.0 g/dL  Hematocrit : 25.0 %  Platelet Count - Automated : 492 K/uL  Mean Cell Volume : 88.7 fl  Mean Cell Hemoglobin : 28.2 pg  Mean Cell Hemoglobin Concentration : 31.8 gm/dL  Auto Neutrophil # : x  Auto Lymphocyte # : x  Auto Monocyte # : x  Auto Eosinophil # : x  Auto Basophil # : x  Auto Neutrophil % : x  Auto Lymphocyte % : x  Auto Monocyte % : x  Auto Eosinophil % : x  Auto Basophil % : x    09-14    139  |  104  |  22  ----------------------------<  101<H>  4.3   |  22  |  0.98    Ca    9.0      14 Sep 2018 07:40  Mg     1.2     09-14        proBNP:     Hemodynamics:  CVP:  PAS:  PAD:  MV Sat:  Marino CO:  Marino CI: CC:  Acute Decompensated Heart failure  24H hour events:  No acute events, the patient feels well, improved since admission.    MEDICATIONS:  aspirin enteric coated 81 milliGRAM(s) Oral daily    imipenem/cilastatin  IVPB 500 milliGRAM(s) IV Intermittent every 8 hours  trimethoprim   80 mG/sulfamethoxazole 400 mG 1 Tablet(s) Oral daily  vancomycin    Solution 125 milliGRAM(s) Oral every 8 hours    HYDROcodone/homatropine Syrup 5 milliLiter(s) Oral every 8 hours PRN    acetaminophen   Tablet .. 650 milliGRAM(s) Oral every 6 hours PRN    famotidine    Tablet 20 milliGRAM(s) Oral daily    dextrose 40% Gel 15 Gram(s) Oral once PRN  dextrose 50% Injectable 12.5 Gram(s) IV Push once  dextrose 50% Injectable 25 Gram(s) IV Push once  dextrose 50% Injectable 25 Gram(s) IV Push once  glucagon  Injectable 1 milliGRAM(s) IntraMuscular once PRN  insulin lispro (HumaLOG) corrective regimen sliding scale   SubCutaneous three times a day before meals  insulin lispro (HumaLOG) corrective regimen sliding scale   SubCutaneous at bedtime  pravastatin 20 milliGRAM(s) Oral at bedtime  predniSONE   Tablet 2 milliGRAM(s) Oral <User Schedule>    dextrose 5%. 1000 milliLiter(s) IV Continuous <Continuous>  multivitamin 1 Tablet(s) Oral daily  sodium bicarbonate 650 milliGRAM(s) Oral <User Schedule>  sodium chloride 0.9% lock flush 3 milliLiter(s) IV Push every 8 hours  tacrolimus 2 milliGRAM(s) Oral <User Schedule>        PHYSICAL EXAM:  T(C): 36.7 (09-15-18 @ 05:32), Max: 37 (09-14-18 @ 13:54)  HR: 126 (09-15-18 @ 05:32) (102 - 126)  BP: 118/86 (09-15-18 @ 05:32) (100/69 - 118/86)  RR: 17 (09-15-18 @ 05:32) (17 - 18)  SpO2: 93% (09-15-18 @ 05:32) (93% - 96%)  I&O's Summary    14 Sep 2018 07:01  -  15 Sep 2018 07:00  --------------------------------------------------------  IN: 720 mL / OUT: 1102 mL / NET: -382 mL        Appearance: Normal	  HEENT:   Normal oral mucosa  Lymphatic: No lymphadenopathy  Cardiovascular: Normal S1 S2,         No JVD,      No murmurs,      No edema  Respiratory: trace bibasilar crackles  Psychiatry: Mood & affect appropriate  Gastrointestinal:  Soft, Non-tender	  Skin: No rashes, No ecchymoses, No cyanosis	  Neurologic: Non-focal  Extremities: Normal range of motion, No clubbing, cyanosis   Vascular: warm extremities        LABS:	 	    CBC Full  -  ( 14 Sep 2018 07:40 )  WBC Count : 5.0 K/uL  Hemoglobin : 8.0 g/dL  Hematocrit : 25.0 %  Platelet Count - Automated : 492 K/uL  Mean Cell Volume : 88.7 fl  Mean Cell Hemoglobin : 28.2 pg  Mean Cell Hemoglobin Concentration : 31.8 gm/dL  Auto Neutrophil # : x  Auto Lymphocyte # : x  Auto Monocyte # : x  Auto Eosinophil # : x  Auto Basophil # : x  Auto Neutrophil % : x  Auto Lymphocyte % : x  Auto Monocyte % : x  Auto Eosinophil % : x  Auto Basophil % : x    09-14    139  |  104  |  22  ----------------------------<  101<H>  4.3   |  22  |  0.98    Ca    9.0      14 Sep 2018 07:40  Mg     1.2     09-14        tele: S 110-140

## 2018-09-15 NOTE — PROGRESS NOTE ADULT - PROBLEM SELECTOR PLAN 1
Continue tacrolimus, check daily tacro level at 0730  Continue prednisone 3mg daily, mepron 1500mg daily, voriconazole 200 BID.  Continue pepcid, calcium, MVI, sodium bicarb BID.  Continue asa and statin.  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  Continue pepcid, calcium, MVI, sodium bicarb BID.  Continue asa and statin.  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

## 2018-09-15 NOTE — PROGRESS NOTE ADULT - ASSESSMENT
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%  Hx HCV s/p tx , CDIF 8/18, UE DVT    #Pneumonia (nocardia on lung biopsy)  - off cellcept , off valcyclovir  - imipenem via picc  - vancomycin  - mepron ppx    # Heart tranplant  - tacrolimus doing per level  - prednisone    #RTA (2/2 EDUARDO)    # Deconditioning  - aggressive PT 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%  Hx HCV s/p tx , CDIF 8/18, UE DVT    #Pneumonia (nocardia on lung biopsy)  - off cellcept , off valcyclovir  - imipenem via picc  - vancomycin  - mepron ppx    # Heart tranplant  - tacrolimus doing per level  - prednisone  - chronic sinus tachycardia     #RTA (2/2 EDUARDO)    # Deconditioning  - aggressive PT 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%  Hx HCV s/p tx , CDIF 8/18, UE DVT    #Pneumonia (nocardia on lung biopsy)  - off cellcept , off valcyclovir  - imipenem via picc  - vancomycin  - mepron ppx  stop voriconazole  keep in house until nocardia speciated    # Heart tranplant  - tacrolimus doing per level goal 8-10  - prednisone  - chronic sinus tachycardia     #RTA (2/2 EDUARDO)    # Deconditioning  - aggressive PT 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%  Hx HCV s/p tx , CDIF 8/18, UE DVT    #Pneumonia (nocardia on lung biopsy)  - off cellcept , off valcyclovir  - imipenem via picc, likely for 6 months  - switched to bactrim for pcp prophylaxis  -  stop voriconazole  - keep in house until nocardia speciated    #Cdiff  - on po vanco taper    # Heart transplant  - immunosuppression  - tacrolimus 2 mg bid, goal 8-10 (as we have taken him off voriconazole his levels will likely drop so we have started to transition him to higher doses)  - prednisone taper per protocol  - off cellcept    #RTA (2/2 EDUARDO)  - sodium bicarb tabs    # Deconditioning  - aggressive PT

## 2018-09-15 NOTE — PROGRESS NOTE ADULT - PROBLEM SELECTOR PLAN 2
blood cultures  f/u   ID following, continue on PO Vanco   q8h  no IV Flagyl   for C.diff blood cultures  f/u   ID following, continue on PO Vanco   q8h  no IV Flagyl   for C.diff   diarrhea resolving

## 2018-09-15 NOTE — PROGRESS NOTE ADULT - ATTENDING COMMENTS
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%  off cellcept due to infections  off valcyte due to leukopenia  RTA IV due to CNI  s/p HepC treatment  Recent Cdiff 8/18  Hx of Ue DVT  hx of LVAD explant, NICM  anemia    Lung biopsy: 9/7 gram positive rods, prelim nocardia  CMV PCR 9/5 not detected  FOBT -  US: partial RIJ thrombus  MRI brain: no mass/tumor    Tacrolimus (), Serum: 8.6:   Culture - Nocardia (09.07.18 @ 13:51)    Specimen Source: .[facility]; Culture Results:  Moderate Rule Out Nocardia species;  Sent to [place] [facility] for Identification Susceptibility to follow.    doing well    - repeat cmv pcr  - imipenem via picc line  - vanco po taper  - immunosuppression  tacro target 8-10,  prednisone taper per protocol   off valcyte  bactrim for pcp ppx

## 2018-09-16 LAB
ANION GAP SERPL CALC-SCNC: 13 MMOL/L — SIGNIFICANT CHANGE UP (ref 5–17)
BUN SERPL-MCNC: 17 MG/DL — SIGNIFICANT CHANGE UP (ref 7–23)
CALCIUM SERPL-MCNC: 9.2 MG/DL — SIGNIFICANT CHANGE UP (ref 8.4–10.5)
CHLORIDE SERPL-SCNC: 100 MMOL/L — SIGNIFICANT CHANGE UP (ref 96–108)
CO2 SERPL-SCNC: 23 MMOL/L — SIGNIFICANT CHANGE UP (ref 22–31)
CREAT SERPL-MCNC: 1.03 MG/DL — SIGNIFICANT CHANGE UP (ref 0.5–1.3)
GLUCOSE BLDC GLUCOMTR-MCNC: 104 MG/DL — HIGH (ref 70–99)
GLUCOSE BLDC GLUCOMTR-MCNC: 113 MG/DL — HIGH (ref 70–99)
GLUCOSE BLDC GLUCOMTR-MCNC: 77 MG/DL — SIGNIFICANT CHANGE UP (ref 70–99)
GLUCOSE BLDC GLUCOMTR-MCNC: 98 MG/DL — SIGNIFICANT CHANGE UP (ref 70–99)
GLUCOSE SERPL-MCNC: 107 MG/DL — HIGH (ref 70–99)
HCT VFR BLD CALC: 27.1 % — LOW (ref 39–50)
HGB BLD-MCNC: 8.6 G/DL — LOW (ref 13–17)
MAGNESIUM SERPL-MCNC: 1.5 MG/DL — LOW (ref 1.6–2.6)
MCHC RBC-ENTMCNC: 28.2 PG — SIGNIFICANT CHANGE UP (ref 27–34)
MCHC RBC-ENTMCNC: 31.9 GM/DL — LOW (ref 32–36)
MCV RBC AUTO: 88.5 FL — SIGNIFICANT CHANGE UP (ref 80–100)
PLATELET # BLD AUTO: 621 K/UL — HIGH (ref 150–400)
POTASSIUM SERPL-MCNC: 3.8 MMOL/L — SIGNIFICANT CHANGE UP (ref 3.5–5.3)
POTASSIUM SERPL-SCNC: 3.8 MMOL/L — SIGNIFICANT CHANGE UP (ref 3.5–5.3)
RBC # BLD: 3.06 M/UL — LOW (ref 4.2–5.8)
RBC # FLD: 20.8 % — HIGH (ref 10.3–14.5)
SODIUM SERPL-SCNC: 136 MMOL/L — SIGNIFICANT CHANGE UP (ref 135–145)
TACROLIMUS SERPL-MCNC: 17.4 NG/ML — SIGNIFICANT CHANGE UP
TACROLIMUS SERPL-MCNC: 27.2 NG/ML — SIGNIFICANT CHANGE UP
WBC # BLD: 5.5 K/UL — SIGNIFICANT CHANGE UP (ref 3.8–10.5)
WBC # FLD AUTO: 5.5 K/UL — SIGNIFICANT CHANGE UP (ref 3.8–10.5)

## 2018-09-16 PROCEDURE — 99232 SBSQ HOSP IP/OBS MODERATE 35: CPT

## 2018-09-16 PROCEDURE — 99233 SBSQ HOSP IP/OBS HIGH 50: CPT

## 2018-09-16 PROCEDURE — 71045 X-RAY EXAM CHEST 1 VIEW: CPT | Mod: 26

## 2018-09-16 RX ADMIN — IMIPENEM AND CILASTATIN 100 MILLIGRAM(S): 250; 250 INJECTION, POWDER, FOR SOLUTION INTRAVENOUS at 22:07

## 2018-09-16 RX ADMIN — Medication 650 MILLIGRAM(S): at 08:01

## 2018-09-16 RX ADMIN — MAGNESIUM OXIDE 400 MG ORAL TABLET 400 MILLIGRAM(S): 241.3 TABLET ORAL at 08:01

## 2018-09-16 RX ADMIN — TACROLIMUS 2 MILLIGRAM(S): 5 CAPSULE ORAL at 08:01

## 2018-09-16 RX ADMIN — Medication 125 MILLIGRAM(S): at 14:53

## 2018-09-16 RX ADMIN — Medication 2 MILLIGRAM(S): at 08:01

## 2018-09-16 RX ADMIN — Medication 1 TABLET(S): at 12:08

## 2018-09-16 RX ADMIN — SODIUM CHLORIDE 3 MILLILITER(S): 9 INJECTION INTRAMUSCULAR; INTRAVENOUS; SUBCUTANEOUS at 05:04

## 2018-09-16 RX ADMIN — Medication 20 MILLIGRAM(S): at 20:10

## 2018-09-16 RX ADMIN — IMIPENEM AND CILASTATIN 100 MILLIGRAM(S): 250; 250 INJECTION, POWDER, FOR SOLUTION INTRAVENOUS at 05:04

## 2018-09-16 RX ADMIN — SODIUM CHLORIDE 3 MILLILITER(S): 9 INJECTION INTRAMUSCULAR; INTRAVENOUS; SUBCUTANEOUS at 21:22

## 2018-09-16 RX ADMIN — MAGNESIUM OXIDE 400 MG ORAL TABLET 400 MILLIGRAM(S): 241.3 TABLET ORAL at 12:07

## 2018-09-16 RX ADMIN — SODIUM CHLORIDE 3 MILLILITER(S): 9 INJECTION INTRAMUSCULAR; INTRAVENOUS; SUBCUTANEOUS at 14:52

## 2018-09-16 RX ADMIN — FAMOTIDINE 20 MILLIGRAM(S): 10 INJECTION INTRAVENOUS at 12:07

## 2018-09-16 RX ADMIN — IMIPENEM AND CILASTATIN 100 MILLIGRAM(S): 250; 250 INJECTION, POWDER, FOR SOLUTION INTRAVENOUS at 14:52

## 2018-09-16 RX ADMIN — MAGNESIUM OXIDE 400 MG ORAL TABLET 400 MILLIGRAM(S): 241.3 TABLET ORAL at 20:09

## 2018-09-16 RX ADMIN — Medication 125 MILLIGRAM(S): at 05:04

## 2018-09-16 RX ADMIN — TACROLIMUS 2 MILLIGRAM(S): 5 CAPSULE ORAL at 20:32

## 2018-09-16 RX ADMIN — Medication 81 MILLIGRAM(S): at 12:12

## 2018-09-16 RX ADMIN — Medication 125 MILLIGRAM(S): at 22:07

## 2018-09-16 RX ADMIN — Medication 650 MILLIGRAM(S): at 20:10

## 2018-09-16 NOTE — PROGRESS NOTE ADULT - SUBJECTIVE AND OBJECTIVE BOX
VITAL SIGNS    Telemetry:      Vital Signs Last 24 Hrs  T(C): 36.6 (09-16-18 @ 12:23), Max: 37 (09-15-18 @ 20:51)  T(F): 97.9 (09-16-18 @ 12:23), Max: 98.6 (09-15-18 @ 20:51)  HR: 126 (09-16-18 @ 12:23) (121 - 128)  BP: 112/81 (09-16-18 @ 12:23) (112/68 - 118/82)  RR: 18 (09-16-18 @ 12:23) (17 - 18)  SpO2: 97% (09-16-18 @ 12:23) (92% - 98%)                   Daily           CAPILLARY BLOOD GLUCOSE      POCT Blood Glucose.: 104 mg/dL (16 Sep 2018 12:17)  POCT Blood Glucose.: 98 mg/dL (16 Sep 2018 07:41)  POCT Blood Glucose.: 98 mg/dL (15 Sep 2018 21:43)  POCT Blood Glucose.: 111 mg/dL (15 Sep 2018 19:23)  POCT Blood Glucose.: 107 mg/dL (15 Sep 2018 16:04)                            PHYSICAL EXAM  S   I feel fine   no chest pain or palpitations"  Neurology: alert and oriented x 3, moves all extremities with no defecits  CV :  RRR  Sternal Wound :  CDI , Stable healed  Lungs:   CTA B/L  Abdomen: soft, nontender, nondistended, positive bowel sounds, last bowel movement   9/15  Extremities:        no edema   no calf tenderness

## 2018-09-16 NOTE — PHYSICAL THERAPY INITIAL EVALUATION ADULT - PRECAUTIONS/LIMITATIONS, REHAB EVAL
Pt with PMH NICM HFrEF s/p HM2 LVAD 6/2017, who underwent heart transplant on 2/23/18 (CMV D-/R+ and Toxo D-/R+, Hep C+ heart) with post op graft dysfunction who underwent plasmapheresis and IVIG x2 as well as rituximab, with suspected fungal PNA diagnosed 6/2018  treated w voriconazole, recurrent PNA with new RUL infiltrate on CT scan 8/18, which  improved on broad spectrum antibiotics. S/p lung biopsy 8/18. C diff toxin positive -still  on oral vanco,. MR head: No acute intracranial hemorrhage, acute ischemia, or abnormal intracranial enhancement. No evidence of intracerebral abscess. Hospital course: 9/8 + fever x1 101.7 9/9 1 liquid/soft BM. Heme consulted for neutropenia possible bone marrow Bx. Tacro 11.8 9/10 VSS 1loos liquid BM needs Bone marrow biopsy 9/11 VSS B/L UE Duplex for  assessment  will need PICC long term abx - found to have Nocardia? 9/12 PICC placed 9/13 MRI brain done pending results, vanco po decreased 9/14 no active issues cmv neg asperg neg. Last CMV negative on 9/5. Enlargement of RUL opacity noted on chest CT compared with that from 8/27, LLL opacity stable. Mild rectal wall thickening noted, ongoing diarrhea but improving. Pt with PMH NICM HFrEF s/p HM2 LVAD 6/2017, who underwent heart transplant on 2/23/18 (CMV D-/R+ and Toxo D-/R+, Hep C+ heart) with post op graft dysfunction who underwent plasmapheresis and IVIG x2 as well as rituximab, with suspected fungal PNA diagnosed 6/2018  treated w voriconazole, recurrent PNA with new RUL infiltrate on CT scan 8/18, which  improved on broad spectrum antibiotics. S/p lung biopsy 8/18. C diff toxin positive -still  on oral vanco,. MR head: No acute intracranial hemorrhage, acute ischemia, or abnormal intracranial enhancement. No evidence of intracerebral abscess. Hospital course: 9/8 + fever x1 101.7 9/9 1 liquid/soft BM. Heme consulted for neutropenia possible bone marrow Bx. Tacro 11.8 9/10 VSS 1loos liquid BM needs Bone marrow biopsy 9/11 VSS B/L UE Duplex for  assessment  will need PICC long term abx - found to have Nocardia? 9/12 PICC placed 9/13 MRI brain done pending results, vanco po decreased 9/14 no active issues cmv neg asperg neg. Last CMV negative on 9/5. Enlargement of RUL opacity noted on chest CT compared with that from 8/27, LLL opacity stable. Mild rectal wall thickening noted, ongoing diarrhea but improving./fall precautions

## 2018-09-16 NOTE — PROGRESS NOTE ADULT - SUBJECTIVE AND OBJECTIVE BOX
24H hour events: No acute events.    MEDICATIONS:  aspirin enteric coated 81 milliGRAM(s) Oral daily  imipenem/cilastatin  IVPB 500 milliGRAM(s) IV Intermittent every 8 hours  trimethoprim   80 mG/sulfamethoxazole 400 mG 1 Tablet(s) Oral daily  vancomycin    Solution 125 milliGRAM(s) Oral every 8 hours  HYDROcodone/homatropine Syrup 5 milliLiter(s) Oral every 8 hours PRN  acetaminophen   Tablet .. 650 milliGRAM(s) Oral every 6 hours PRN  famotidine    Tablet 20 milliGRAM(s) Oral daily  dextrose 40% Gel 15 Gram(s) Oral once PRN  dextrose 50% Injectable 12.5 Gram(s) IV Push once  dextrose 50% Injectable 25 Gram(s) IV Push once  dextrose 50% Injectable 25 Gram(s) IV Push once  glucagon  Injectable 1 milliGRAM(s) IntraMuscular once PRN  insulin lispro (HumaLOG) corrective regimen sliding scale   SubCutaneous three times a day before meals  insulin lispro (HumaLOG) corrective regimen sliding scale   SubCutaneous at bedtime  pravastatin 20 milliGRAM(s) Oral at bedtime  predniSONE   Tablet 2 milliGRAM(s) Oral <User Schedule>  dextrose 5%. 1000 milliLiter(s) IV Continuous <Continuous>  magnesium oxide 400 milliGRAM(s) Oral three times a day with meals  multivitamin 1 Tablet(s) Oral daily  sodium bicarbonate 650 milliGRAM(s) Oral <User Schedule>  sodium chloride 0.9% lock flush 3 milliLiter(s) IV Push every 8 hours  tacrolimus 2 milliGRAM(s) Oral <User Schedule>    REVIEW OF SYSTEMS:  Complete 10point ROS negative.    PHYSICAL EXAM:  T(C): 36.9 (09-16-18 @ 04:32), Max: 37 (09-15-18 @ 20:51)  HR: 121 (09-16-18 @ 04:32) (121 - 122)  BP: 116/82 (09-16-18 @ 04:32) (112/68 - 118/82)  RR: 17 (09-16-18 @ 04:32) (17 - 18)  SpO2: 92% (09-16-18 @ 04:32) (92% - 96%)  Wt(kg): --  I&O's Summary    15 Sep 2018 07:01  -  16 Sep 2018 07:00  --------------------------------------------------------  IN: 740 mL / OUT: 902 mL / NET: -162 mL      Appearance: NAD, hot to touch	  HEENT:   Normal oral mucosa, PERRL, EOMI	  Cardiovascular: Normal S1 S2, No JVD, No murmurs, No edema  Respiratory: Lungs clear to auscultation	  Psychiatry: A & O x 3, Mood & affect appropriate  Gastrointestinal:  Soft, Non-tender, + BS	  Neurologic: Non-focal  Vascular: Peripheral pulses palpable 2+ bilaterally    LABS:	 	    CBC Full  -  ( 15 Sep 2018 08:20 )  WBC Count : 5.5 K/uL  Hemoglobin : 8.6 g/dL  Hematocrit : 27.3 %  Platelet Count - Automated : 544 K/uL  Mean Cell Volume : 88.0 fl  Mean Cell Hemoglobin : 27.6 pg  Mean Cell Hemoglobin Concentration : 31.4 gm/dL    09-15    135  |  101  |  21  ----------------------------<  122<H>  3.7   |  23  |  1.05    Ca    9.3      15 Sep 2018 08:20  Mg     1.6     09-15    TELEMETRY: sinus 110-140s

## 2018-09-16 NOTE — PHYSICAL THERAPY INITIAL EVALUATION ADULT - GENERAL OBSERVATIONS, REHAB EVAL
DC working towards home with home PT, owns rolling walker, has HHA 9-7pm/7days, brother to assist as needed, DC plan to be emailed, CM to be notified.

## 2018-09-16 NOTE — PROGRESS NOTE ADULT - PROBLEM SELECTOR PLAN 1
d/w  HF  atc level 27.2  Continue prednisone 3mg daily, mepron 1500mg daily, voriconazole 200 BID.  sodium bicarb BID.  Continue asa and statin.  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

## 2018-09-16 NOTE — PHYSICAL THERAPY INITIAL EVALUATION ADULT - PERTINENT HX OF CURRENT PROBLEM, REHAB EVAL
Pt is a 68 year old man admitted to [facility] on 9/5/18 for RHC and biopsy. Temp on arrival 99.9 with cough, clear sputum, mild dyspnea at rest. Tachy 130sHR.Also right hand swelling + warmth noted - states present for 4 days.  Wrist and joint tenderness over 4th + 5th digits.  Blood cultures/Lactate +Uric acid ordered. In addition, patient reports watery stools x 4 today.

## 2018-09-16 NOTE — PROGRESS NOTE ADULT - ATTENDING COMMENTS
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%  off cellcept due to infections  off valcyte due to leukopenia  RTA IV due to CNI  s/p HepC treatment  Recent Cdiff 8/18  Hx of Ue DVT  hx of LVAD explant, NICM  anemia    Lung biopsy: 9/7 gram positive rods, prelim nocardia  CMV PCR 9/5 not detected  FOBT -  US: partial RIJ thrombus  MRI brain: no mass/tumor    Tacrolimus (), Serum: 8.6:   Culture - Nocardia (09.07.18 @ 13:51)    Specimen Source: .[facility]; Culture Results:  Moderate Rule Out Nocardia species;  Sent to [place] [facility] for Identification Susceptibility to follow.    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%  off cellcept due to infections  off valcyte due to leukopenia  RTA IV due to CNI  s/p HepC treatment  Recent Cdiff 8/18  Hx of Ue DVT  hx of LVAD explant, NICM  anemia    Lung biopsy: 9/7 gram positive rods, prelim nocardia  CMV PCR 9/5 not detected  FOBT -  US: partial RIJ thrombus  MRI brain: no mass/tumor    tacro level yesterday 10.6, today ?27, will clarify time of blood draw, otherwise doing well  doing well    - repeat tacro level @6pm, will redose accordingly  - imipenem via picc line  - vanco po taper  - immunosuppression  tacro target 8-10,  off cellcept  prednisone taper per protocol   off valcyte  bactrim for pcp ppx

## 2018-09-16 NOTE — PHYSICAL THERAPY INITIAL EVALUATION ADULT - ACTIVE RANGE OF MOTION EXAMINATION, REHAB EVAL
Right LE Active ROM was WFL (within functional limits)/Left UE Active ROM was WFL (within functional limits)/Right UE Active ROM was WFL (within functional limits)/Left LE Active ROM was WFL (within functional limits)

## 2018-09-16 NOTE — PROGRESS NOTE ADULT - PROBLEM SELECTOR PLAN 2
blood cultures  f/u   ID following, continue on PO Vanco   q8h  no IV Flagyl   for C.diff   diarrhea resolving

## 2018-09-16 NOTE — PROGRESS NOTE ADULT - ASSESSMENT
69 y/o male with PMH NICM HFrEF s/p HM2 LVAD 6/2017, who underwent heart transplant on 2/23/18 (CMV D-/R+ and Toxo D-/R+, Hep C+ heart) with post op graft dysfunction who underwent plasmapheresis and IVIG x2 as well as rituximab, with suspected fungal PNA diagnosed 6/2018  treated w voriconazole, recurrent PNA with new RUL infiltrate on CT scan 8/18, which  improved on broad spectrum antibiotics. S/p lung biopsy 8/18. C diff toxin positive -still  on oral vanco,.  Patient presents today for RHC and cardiac biopsy with c/o ongoing diarrhea (3-4 loose BM daily) and mildly productive cough, denies fevers, chills, abd pain, N/V. EKG demonstrated ST at 133bpm. Pt readmitted for further workup.  9/6 one formed BM this am; CMV study's pending, BC pending afebrile, albumin 500 cc for dehydration  potassium supplemented, transition to regular diet.   9/7 VVS; underwent IR procedure of Right lung mass FNA.  Tolerated procedure well.  Culture pending. Vanco IV D/C per ID. Continue with current medication regimen. Send Legionella urine culture and check voriconazole (T) in AM   Disposition: Home once medically cleared   9/8 + fever x1 101.7 as per ID cefepine decreased to 2g bid ivpb  9/9 VSS. 1 liquid/soft BM. Heme consulted for neutropenia possible bone marrow Bx. Deferring Bx at this time until anemia work up complete. Tacro 11.8. F/U HF recs  9/10 VSS 1loos liquid BM needs Bone marrow biopsy Tacro11.5  Hycodan for cough  9/11 VSS B/L UE Duplex for  assessment  will need PICC long term abx - found to have Nocardia?- MRI brain  to r/o abcess  9/12 PICC placed xray pending  MRI for today   ABX per ID mipenem/cilastin 500mg IV q8  9/13 MRI brain done pending results , vanco po decreased to q8h lasix 20mg ivx1   9/14 no active issues  cmv neg asperg neg   9/16    Tac levek   27.2   will d/c HF team  OOB to chair   VSS

## 2018-09-16 NOTE — PROGRESS NOTE ADULT - ASSESSMENT
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%  Hx HCV s/p tx , CDIF 8/18, UE DVT    #Pneumonia (nocardia on lung biopsy)  - off cellcept , off valcyclovir  - imipenem via picc, likely for 6 months  - bactrim for pcp prophylaxis  - stop voriconazole  - keep in house until nocardia speciated    #Cdiff  - on po vanco taper    # Heart transplant  - immunosuppression  - tacrolimus 2 mg bid, goal 8-10 (as we have taken him off voriconazole his levels will likely drop so we have started to transition him to higher doses)  - prednisone taper per protocol  - off cellcept    #RTA (2/2 EDUARDO)  - sodium bicarb tabs    # Deconditioning  - aggressive PT    [id number] 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%  Hx HCV s/p tx , CDIF 8/18, UE DVT    #Pneumonia (nocardia on lung biopsy)  - off cellcept , off valcyclovir  - imipenem via picc, likely for 6 months  - bactrim for pcp prophylaxis  - stopped voriconazole 9/14 (last dose)  - keep in house until nocardia speciated    #Cdiff  - on po vanco taper    # Heart transplant  - immunosuppression  - tacrolimus, goal 8-10, level 27 today, will need to clarify if dose was given prior to labs; will repeat level @6pm and dose accordingly  - prednisone taper per protocol  - off cellcept    #RTA (2/2 EDUARDO)  - sodium bicarb tabs    # Deconditioning  - aggressive PT    [id number]

## 2018-09-16 NOTE — PHYSICAL THERAPY INITIAL EVALUATION ADULT - DISCHARGE DISPOSITION, PT EVAL
DC home with home PT, owns rolling walker, has HHA 9-7pm/7days, brother to assist as needed, DC plan to be emailed, CM to be notified.

## 2018-09-16 NOTE — PROGRESS NOTE ADULT - PROBLEM SELECTOR PLAN 3
s/p 9/7 Right lung FNA   ID following  as per ID Dr Lujan Imipenem/cilastin 500mg IV q8  on voriconazole 200 milliGRAM(s) Oral every 12 hours     abx 6 months per HF

## 2018-09-17 DIAGNOSIS — A04.72 ENTEROCOLITIS DUE TO CLOSTRIDIUM DIFFICILE, NOT SPECIFIED AS RECURRENT: ICD-10-CM

## 2018-09-17 DIAGNOSIS — D89.9 DISORDER INVOLVING THE IMMUNE MECHANISM, UNSPECIFIED: ICD-10-CM

## 2018-09-17 DIAGNOSIS — Z91.89 OTHER SPECIFIED PERSONAL RISK FACTORS, NOT ELSEWHERE CLASSIFIED: ICD-10-CM

## 2018-09-17 DIAGNOSIS — E83.42 HYPOMAGNESEMIA: ICD-10-CM

## 2018-09-17 DIAGNOSIS — Z94.1 HEART TRANSPLANT STATUS: ICD-10-CM

## 2018-09-17 DIAGNOSIS — A43.0 PULMONARY NOCARDIOSIS: ICD-10-CM

## 2018-09-17 LAB
ANION GAP SERPL CALC-SCNC: 13 MMOL/L — SIGNIFICANT CHANGE UP (ref 5–17)
BUN SERPL-MCNC: 19 MG/DL — SIGNIFICANT CHANGE UP (ref 7–23)
CALCIUM SERPL-MCNC: 8.9 MG/DL — SIGNIFICANT CHANGE UP (ref 8.4–10.5)
CHLORIDE SERPL-SCNC: 101 MMOL/L — SIGNIFICANT CHANGE UP (ref 96–108)
CMV DNA CSF QL NAA+PROBE: SIGNIFICANT CHANGE UP
CO2 SERPL-SCNC: 24 MMOL/L — SIGNIFICANT CHANGE UP (ref 22–31)
CREAT SERPL-MCNC: 0.98 MG/DL — SIGNIFICANT CHANGE UP (ref 0.5–1.3)
GLUCOSE BLDC GLUCOMTR-MCNC: 105 MG/DL — HIGH (ref 70–99)
GLUCOSE BLDC GLUCOMTR-MCNC: 114 MG/DL — HIGH (ref 70–99)
GLUCOSE BLDC GLUCOMTR-MCNC: 116 MG/DL — HIGH (ref 70–99)
GLUCOSE BLDC GLUCOMTR-MCNC: 85 MG/DL — SIGNIFICANT CHANGE UP (ref 70–99)
GLUCOSE SERPL-MCNC: 109 MG/DL — HIGH (ref 70–99)
HCT VFR BLD CALC: 25.4 % — LOW (ref 39–50)
HGB BLD-MCNC: 8.2 G/DL — LOW (ref 13–17)
MCHC RBC-ENTMCNC: 28.6 PG — SIGNIFICANT CHANGE UP (ref 27–34)
MCHC RBC-ENTMCNC: 32.4 GM/DL — SIGNIFICANT CHANGE UP (ref 32–36)
MCV RBC AUTO: 88.4 FL — SIGNIFICANT CHANGE UP (ref 80–100)
PLATELET # BLD AUTO: 575 K/UL — HIGH (ref 150–400)
POTASSIUM SERPL-MCNC: 4 MMOL/L — SIGNIFICANT CHANGE UP (ref 3.5–5.3)
POTASSIUM SERPL-SCNC: 4 MMOL/L — SIGNIFICANT CHANGE UP (ref 3.5–5.3)
RBC # BLD: 2.87 M/UL — LOW (ref 4.2–5.8)
RBC # FLD: 20.7 % — HIGH (ref 10.3–14.5)
SODIUM SERPL-SCNC: 138 MMOL/L — SIGNIFICANT CHANGE UP (ref 135–145)
TACROLIMUS SERPL-MCNC: 18.3 NG/ML — SIGNIFICANT CHANGE UP
WBC # BLD: 4.1 K/UL — SIGNIFICANT CHANGE UP (ref 3.8–10.5)
WBC # FLD AUTO: 4.1 K/UL — SIGNIFICANT CHANGE UP (ref 3.8–10.5)

## 2018-09-17 PROCEDURE — 99232 SBSQ HOSP IP/OBS MODERATE 35: CPT

## 2018-09-17 PROCEDURE — 71045 X-RAY EXAM CHEST 1 VIEW: CPT | Mod: 26

## 2018-09-17 PROCEDURE — 99233 SBSQ HOSP IP/OBS HIGH 50: CPT

## 2018-09-17 PROCEDURE — 99232 SBSQ HOSP IP/OBS MODERATE 35: CPT | Mod: GC

## 2018-09-17 PROCEDURE — 90791 PSYCH DIAGNOSTIC EVALUATION: CPT

## 2018-09-17 RX ORDER — TACROLIMUS 5 MG/1
1 CAPSULE ORAL
Qty: 0 | Refills: 0 | Status: DISCONTINUED | OUTPATIENT
Start: 2018-09-17 | End: 2018-09-20

## 2018-09-17 RX ADMIN — Medication 125 MILLIGRAM(S): at 14:21

## 2018-09-17 RX ADMIN — IMIPENEM AND CILASTATIN 100 MILLIGRAM(S): 250; 250 INJECTION, POWDER, FOR SOLUTION INTRAVENOUS at 05:53

## 2018-09-17 RX ADMIN — FAMOTIDINE 20 MILLIGRAM(S): 10 INJECTION INTRAVENOUS at 11:47

## 2018-09-17 RX ADMIN — SODIUM CHLORIDE 3 MILLILITER(S): 9 INJECTION INTRAMUSCULAR; INTRAVENOUS; SUBCUTANEOUS at 14:22

## 2018-09-17 RX ADMIN — Medication 2 MILLIGRAM(S): at 08:01

## 2018-09-17 RX ADMIN — Medication 125 MILLIGRAM(S): at 05:53

## 2018-09-17 RX ADMIN — IMIPENEM AND CILASTATIN 100 MILLIGRAM(S): 250; 250 INJECTION, POWDER, FOR SOLUTION INTRAVENOUS at 14:21

## 2018-09-17 RX ADMIN — Medication 1 TABLET(S): at 11:48

## 2018-09-17 RX ADMIN — Medication 125 MILLIGRAM(S): at 22:26

## 2018-09-17 RX ADMIN — TACROLIMUS 2 MILLIGRAM(S): 5 CAPSULE ORAL at 08:00

## 2018-09-17 RX ADMIN — MAGNESIUM OXIDE 400 MG ORAL TABLET 400 MILLIGRAM(S): 241.3 TABLET ORAL at 11:47

## 2018-09-17 RX ADMIN — Medication 650 MILLIGRAM(S): at 08:00

## 2018-09-17 RX ADMIN — MAGNESIUM OXIDE 400 MG ORAL TABLET 400 MILLIGRAM(S): 241.3 TABLET ORAL at 08:01

## 2018-09-17 RX ADMIN — MAGNESIUM OXIDE 400 MG ORAL TABLET 400 MILLIGRAM(S): 241.3 TABLET ORAL at 20:04

## 2018-09-17 RX ADMIN — Medication 81 MILLIGRAM(S): at 11:47

## 2018-09-17 RX ADMIN — TACROLIMUS 1 MILLIGRAM(S): 5 CAPSULE ORAL at 22:25

## 2018-09-17 RX ADMIN — SODIUM CHLORIDE 3 MILLILITER(S): 9 INJECTION INTRAMUSCULAR; INTRAVENOUS; SUBCUTANEOUS at 05:52

## 2018-09-17 RX ADMIN — Medication 20 MILLIGRAM(S): at 22:25

## 2018-09-17 RX ADMIN — IMIPENEM AND CILASTATIN 100 MILLIGRAM(S): 250; 250 INJECTION, POWDER, FOR SOLUTION INTRAVENOUS at 22:26

## 2018-09-17 RX ADMIN — Medication 1 TABLET(S): at 11:47

## 2018-09-17 RX ADMIN — Medication 650 MILLIGRAM(S): at 20:05

## 2018-09-17 RX ADMIN — SODIUM CHLORIDE 3 MILLILITER(S): 9 INJECTION INTRAMUSCULAR; INTRAVENOUS; SUBCUTANEOUS at 22:26

## 2018-09-17 NOTE — PROGRESS NOTE ADULT - ASSESSMENT
69 y/o male with PMH NICM HFrEF s/p HM2 LVAD 6/2017, who underwent heart transplant on 2/23/18 (CMV D-/R+ and Toxo D-/R+, Hep C+ heart) with post op graft dysfunction who underwent plasmapheresis and IVIG x2 as well as rituximab, with suspected fungal PNA diagnosed 6/2018  treated w voriconazole, recurrent PNA with new RUL infiltrate on CT scan 8/18, which  improved on broad spectrum antibiotics. S/p lung biopsy 8/18. C diff toxin positive -still  on oral vanco,.  Patient presents today for RHC and cardiac biopsy with c/o ongoing diarrhea (3-4 loose BM daily) and mildly productive cough, denies fevers, chills, abd pain, N/V. EKG demonstrated ST at 133bpm. Pt readmitted for further workup.  9/6 one formed BM this am; CMV study's pending, BC pending afebrile, albumin 500 cc for dehydration  potassium supplemented, transition to regular diet.   9/7 VVS; underwent IR procedure of Right lung mass FNA.  Tolerated procedure well.  Culture pending. Vanco IV D/C per ID. Continue with current medication regimen. Send Legionella urine culture and check voriconazole (T) in AM   Disposition: Home once medically cleared   9/8 + fever x1 101.7 as per ID cefepine decreased to 2g bid ivpb  9/9 VSS. 1 liquid/soft BM. Heme consulted for neutropenia possible bone marrow Bx. Deferring Bx at this time until anemia work up complete. Tacro 11.8. F/U HF recs  9/10 VSS 1loos liquid BM needs Bone marrow biopsy Tacro11.5  Hycodan for cough  9/11 VSS B/L UE Duplex for  assessment  will need PICC long term abx - found to have Nocardia?- MRI brain  to r/o abcess  9/12 PICC placed xray pending  MRI for today   ABX per ID mipenem/cilastin 500mg IV q8  9/13 MRI brain done pending results , vanco po decreased to q8h lasix 20mg ivx1   9/14 no active issues  cmv neg asperg neg   9/16    Tac levek   27.2   will d/c HF team  OOB to chair   VSS  9/17   VSS    on prograf 2 mg BID   dw   Dr Lujan  pt to neeed potential off 6 months abx for Nocardia  in lung    + rt antecub  midline

## 2018-09-17 NOTE — CONSULT NOTE ADULT - CONSULT REASON
Diarrhea, cough in heart transplant recipient
Evaluate mood
anemia, need for bone marrow biopsy
Diarrhea
Admission

## 2018-09-17 NOTE — PROGRESS NOTE ADULT - ASSESSMENT
67 yo M s/p heart transplant complicated by rejection with rounds of plasmaphoresis , IVIG, rituximab x2, known colonizer with  Pseudomonas last treated for PNA in June 2018  and again in August 14-30 with cefepime and voriconazole as well as c.diff with PO vanco presented with decreased PO intake, 4 watery BM daily and a productive cough. Pt had CT guided R lung mass bx on 8/17 which showed acute inflammation but was negative bacterial or fungal etiology. Concerned now for cdiff colitis vs CMV colitis.     last admission labs:   Fungitell negative  Galactomannan negative  Crypto Ag negative  Quant indeterminate, however lung biopsy w negative AFB stain  Pathology report from 8/17 R lung bx showing inflammation w negative AFB and GMS stain  First CT guided biopsy cultures are NGTD- only grew a corynebacterium that is likely not a pathogen  Spoke with the micro. lab: 9/7 pleural biopsy tissue with gram positive beaded rods- maldi-tof unable to identify. The lab is awaiting further growth of the organism for repeat testing  ? Actino/nocardia  He is responding again to C.diff treatment; switched from cefepime to imipenem for concern of nocardia    Hep C neg (8/22)    fungitell neg  galactomannan neg   -Stool for GI PCR- negative  -CMV PCR- negative    Suggest:  -Continue PO Vancomycin for cdiff- on taper (125 mg orally twice daily for 7 days, then 125 mg orally once daily for 7 days, then 125 mg orally every 2 or 3 days for 8 weeks)  -F/U blood cultures - NGTD  -BAL now changed to ?nocardia- possible cefepime is partially treating nocardia and that's why pt improving in btwn hospital visits vs pseudomonal pna- changed cefepime to imipenem and will update team on outpatient management for Nocardia prior to discharge  -Monitor CrCl while on abx  -Changed Mepron to Bactrim SS daily  -We discontinued the Voriconazole (no fungus isolated)- Tacrolimus levels will need to be adjusted  -Pt s/p IR biopsy-specimen sent for culture, fungal, AFB, and nocardia and pathology--> AFB negative, awaiting other cultures (Nocardia culture and sensitivities sent to [facility]) 67 yo M s/p heart transplant complicated by rejection with rounds of plasmaphoresis , IVIG, rituximab x2, known colonizer with  Pseudomonas last treated for PNA in June 2018  and again in August 14-30 with cefepime and voriconazole as well as c.diff with PO vanco presented with decreased PO intake, 4 watery BM daily and a productive cough. Pt had CT guided R lung mass bx on 8/17 which showed acute inflammation but was negative bacterial or fungal etiology. Concerned now for cdiff colitis vs CMV colitis.     last admission labs:   Fungitell negative  Galactomannan negative  Crypto Ag negative  Quant indeterminate, however lung biopsy w negative AFB stain  Pathology report from 8/17 R lung bx showing inflammation w negative AFB and GMS stain  First CT guided biopsy cultures are NGTD- only grew a corynebacterium that is likely not a pathogen  Spoke with the micro. lab: 9/7 pleural biopsy tissue with gram positive beaded rods- maldi-tof unable to identify. The lab is awaiting further growth of the organism for repeat testing  ? Actino/nocardia  He is responding again to C.diff treatment; switched from cefepime to imipenem for concern of nocardia    Hep C neg (8/22)    fungitell neg  galactomannan neg   -Stool for GI PCR- negative  -CMV PCR- negative    Suggest:  -Continue PO Vancomycin for cdiff- on taper (125 mg orally twice daily for 7 days, then 125 mg orally once daily for 7 days, then 125 mg orally every 2 or 3 days for 8 weeks)  -F/U blood cultures - NGTD  -BAL now changed to ?nocardia- possible cefepime is partially treating nocardia and that's why pt improving in btwn hospital visits vs pseudomonal pna- changed cefepime to imipenem   -Upon discharge, patient will be on Ertapenem 1gram daily and will need to follow up in Transplant and ID clinic for labs and follow up of Nocardia sensitivities   -Monitor CrCl while on abx  -Changed Mepron to Bactrim SS daily  -We discontinued the Voriconazole (no fungus isolated)- Tacrolimus levels will need to be adjusted  -Pt s/p IR biopsy-specimen sent for culture, fungal, AFB, and nocardia and pathology--> AFB negative, awaiting other cultures (Nocardia culture and sensitivities sent to [facility])

## 2018-09-17 NOTE — PROGRESS NOTE ADULT - SUBJECTIVE AND OBJECTIVE BOX
Subjective: No current complaints. He feels well. He is not short of breath. He has no dizziness and no nausea or vomiting.     Medications:  acetaminophen   Tablet .. 650 milliGRAM(s) Oral every 6 hours PRN  aspirin enteric coated 81 milliGRAM(s) Oral daily  Calcium citrate + vit d3 315mg/250 IU 2 Tablet(s) 2 Tablet(s) Oral two times a day  dextrose 40% Gel 15 Gram(s) Oral once PRN  dextrose 5%. 1000 milliLiter(s) IV Continuous <Continuous>  dextrose 50% Injectable 12.5 Gram(s) IV Push once  dextrose 50% Injectable 25 Gram(s) IV Push once  dextrose 50% Injectable 25 Gram(s) IV Push once  famotidine    Tablet 20 milliGRAM(s) Oral daily  glucagon  Injectable 1 milliGRAM(s) IntraMuscular once PRN  HYDROcodone/homatropine Syrup 5 milliLiter(s) Oral every 8 hours PRN  imipenem/cilastatin  IVPB 500 milliGRAM(s) IV Intermittent every 8 hours  insulin lispro (HumaLOG) corrective regimen sliding scale   SubCutaneous three times a day before meals  insulin lispro (HumaLOG) corrective regimen sliding scale   SubCutaneous at bedtime  magnesium oxide 400 milliGRAM(s) Oral three times a day with meals  multivitamin 1 Tablet(s) Oral daily  pravastatin 20 milliGRAM(s) Oral at bedtime  predniSONE   Tablet 2 milliGRAM(s) Oral <User Schedule>  sodium bicarbonate 650 milliGRAM(s) Oral <User Schedule>  sodium chloride 0.9% lock flush 3 milliLiter(s) IV Push every 8 hours  tacrolimus 2 milliGRAM(s) Oral <User Schedule>  trimethoprim   80 mG/sulfamethoxazole 400 mG 1 Tablet(s) Oral daily  vancomycin    Solution 125 milliGRAM(s) Oral every 8 hours    Physical Exam:    Vital Signs Last 24 Hrs  T(C): 36.3 (17 Sep 2018 12:32), Max: 36.8 (16 Sep 2018 20:21)  T(F): 97.3 (17 Sep 2018 12:32), Max: 98.3 (16 Sep 2018 20:21)  HR: 76 (17 Sep 2018 12:32) (76 - 119)  BP: 120/86 (17 Sep 2018 12:32) (107/75 - 120/86)  RR: 18 (17 Sep 2018 12:32) (18 - 18)  SpO2: 98% (17 Sep 2018 12:32) (95% - 99%)    Daily Weight in k.8 (17 Sep 2018 08:49)    I&O's Summary    16 Sep 2018 07:01  -  17 Sep 2018 07:00  --------------------------------------------------------  IN: 560 mL / OUT: 550 mL / NET: 10 mL    General: No distress. Comfortable.  HEENT: EOM intact.  Neck: Neck supple. JVP not elevated. No masses  Chest: Clear to auscultation bilaterally  CV: RRR with normal S1 and S2. No murmurs, rub, or gallops. Radial pulses normal. No edema.   Abdomen: Soft, non-distended, non-tender  Skin: No rashes. Dry necrosis of the distal first digit of his right hand.   Neurology: Alert and oriented times three. Sensation intact  Psych: Affect normal    Labs:                        8.2    4.1   )-----------( 575      ( 17 Sep 2018 07:38 )             25.4     -    138  |  101  |  19  ----------------------------<  109<H>  4.0   |  24  |  0.98    Ca    8.9      17 Sep 2018 07:38  Mg     1.5     -

## 2018-09-17 NOTE — PROGRESS NOTE ADULT - SUBJECTIVE AND OBJECTIVE BOX
VITAL SIGNS    Telemetry:     st  110    Vital Signs Last 24 Hrs  T(C): 36.8 (18 @ 04:50), Max: 36.8 (18 @ 20:21)  T(F): 98.2 (18 @ 04:50), Max: 98.3 (18 @ 20:21)  HR: 100 (18 @ 04:50) (100 - 128)  BP: 116/82 (18 @ 04:50) (107/75 - 116/82)  RR: 18 (18 @ 04:50) (18 - 18)  SpO2: 99% (18 @ 04:50) (95% - 99%)             Daily Weight in k.8 (17 Sep 2018 08:49)        CAPILLARY BLOOD GLUCOSE      POCT Blood Glucose.: 105 mg/dL (17 Sep 2018 08:02)  POCT Blood Glucose.: 113 mg/dL (16 Sep 2018 21:27)  POCT Blood Glucose.: 77 mg/dL (16 Sep 2018 19:16)  POCT Blood Glucose.: 104 mg/dL (16 Sep 2018 12:17)                                PHYSICAL EXAM    Neurology: alert and oriented x 3, moves all extremities with no defecits  CV :  RRR  Sternal Wound :  CDI , Stable  Lungs:   CTA B/L  Abdomen: soft, nontender, nondistended, positive bowel sounds,  Extremities:       trace  le edema   no calf tenderness VITAL SIGNS    Telemetry:     st  110    Vital Signs Last 24 Hrs  T(C): 36.8 (18 @ 04:50), Max: 36.8 (18 @ 20:21)  T(F): 98.2 (18 @ 04:50), Max: 98.3 (18 @ 20:21)  HR: 100 (18 @ 04:50) (100 - 128)  BP: 116/82 (18 @ 04:50) (107/75 - 116/82)  RR: 18 (18 @ 04:50) (18 - 18)  SpO2: 99% (18 @ 04:50) (95% - 99%)             Daily Weight in k.8 (17 Sep 2018 08:49)        CAPILLARY BLOOD GLUCOSE      POCT Blood Glucose.: 105 mg/dL (17 Sep 2018 08:02)  POCT Blood Glucose.: 113 mg/dL (16 Sep 2018 21:27)  POCT Blood Glucose.: 77 mg/dL (16 Sep 2018 19:16)  POCT Blood Glucose.: 104 mg/dL (16 Sep 2018 12:17)                                PHYSICAL EXAM  s    "I feel well,       no chest pain  no SOB"  Neurology: alert and oriented x 3, moves all extremities with no defecits  CV :  RRR  Sternal Wound :  CDI , Stable  Lungs:   CTA B/L  Abdomen: soft, nontender, nondistended, positive bowel sounds,  Extremities:       trace  le edema   no calf tenderness

## 2018-09-17 NOTE — PROGRESS NOTE ADULT - PROBLEM SELECTOR PLAN 2
- Tacrolimus level today 18.3.   - Decrease tacrolimus to 1 mg PO BID.  - Continue prednisone 2 mg daily.    - Continue off of Cellcept.

## 2018-09-17 NOTE — PROGRESS NOTE ADULT - PROBLEM SELECTOR PLAN 1
d/w  HF  tac level   17 yesterday  Continue prednisone 3mg daily, mepron 1500mg daily, voriconazole 200 BID.  sodium bicarb BID.  Continue asa and statin.  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  Continue asa and statin.  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

## 2018-09-17 NOTE — PROGRESS NOTE ADULT - PROBLEM SELECTOR PLAN 3
s/p 9/7 Right lung FNA   ID following  as per ID Dr Lujan Imipenem/cilastin 500mg IV q8  on voriconazole 200 milliGRAM(s) Oral every 12 hours     abx 6 months per HF s/p 9/7 Right lung FNA   ID following  as per ID Dr Lujan Imipenem/cilastin 500mg IV q8  abx 6 months per HF

## 2018-09-17 NOTE — CONSULT NOTE ADULT - SUBJECTIVE AND OBJECTIVE BOX
Behavioral Cardiology Psychological Assessment   Information obtained from patient and chart.  History of present illness:  Mr. 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  Admitted with worsening pneumonia.  No past psychiatric history.     Social history:  , only son . Lives in private house he owns in [place] with his brother.  Patient’s wife  in .  He has a 15 year old granddaughter who lives with her mother close by.  Good support from family and close friend; has home health aide 7 days/week. On disability.  Education: 10th Grade (very limited literacy skills).      Substance use:   ·	Tobacco: former use; quit 50 years ago, smoked 2 cigarettes day for 3 years.   §	Alcohol: former; drank 4-5 drinks of rum 5 days/week for 40 years; stopped 3   years ago.      ·	Drug: occasional marijuana use many years ago; no other substance use.     Understanding of health history/treatment plan:  Good understanding of reasons for admission and treatment plan.  Understands he will be going home on IV antibiotics.  At home has assistance with all aspects of his care: home health aide (7 days/week), VNS, and infusion RN.  Additional assistance provided by close friend (Bekah) and his brother (Nawaf).  Understands the importance of taking all medications as prescribed most importantly his immunosuppression medications.      Psychological assessment:    ·	Past psychiatric history: None.    ·	Depression: denies but at times frustrated with hospitalization.     ·	Anxiety:  denies.     ·	Coping strategies: Talking with family and staff, watching TV, his pastora, talking to his granddaughter   ·	Sleep:  sleeps well.     ·	Appetite:  fair; "my appetite is not back."     Mental Status Exam:  Seen sitting in chair. A&Ox3.  Well-related with good eye contact. Speech normal rate and volume. Thought process goal directed. No evidence of any psychosis, delusions, jona. Mood "I’m ok." Affect slightly constricted. Insight and judgment adequate.      Impression: Mr. Baez is a 68 year-old man admitted with worsening pneumonia.  Reports he is feeling better.  Ambulating with assistance around unit (1 full lap); denies shortness of breath.  Appetite fair. Reports he is coping well with adjustment to living with heart transplant.  Feels he has good support system to help him with necessary life style changes.  Denies symptoms of anxiety/depression.  Reviewed strategies for coping with hospitalization.   Dx:  r/o Adjustment disorder; F43.20     Recommendations:   Behavioral Cardiology will follow as needed

## 2018-09-17 NOTE — PROGRESS NOTE ADULT - ASSESSMENT
Mr. 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 He is currently responding to therapy. He has no evidence of graft dysfunction on exam and he is euvolemic.     Please call me with questions at [phone]. Discussed with [facility] NP team

## 2018-09-17 NOTE — PROGRESS NOTE ADULT - PROBLEM SELECTOR PLAN 1
- Continue Imipenem per ID recommendations. Plan will be for total of six months of therapy. We are arranging for outpatient therapy.

## 2018-09-17 NOTE — PROGRESS NOTE ADULT - ATTENDING COMMENTS
Patient seen and examined with Dr. Perez  I agree with his interval history, exam findings and plans as noted above    s/p heart transplant in Feb 2018, pneumonia with nocardia on Imipenem but not practical for discharge  Nocardia specimen being sent to [facility] for further sensitivity testing  will plan to discharge home on a combination of IV ertapenem 1 gram/day and Bactrim DS one pill per day  Vancomycin taper for prior C.diff infection    Gary Lujan MD  [phone]  After 5pm/weekends [phone]

## 2018-09-17 NOTE — PROGRESS NOTE ADULT - SUBJECTIVE AND OBJECTIVE BOX
Follow Up:      Interval History/ROS: Overnight no events. Pt has had one loose BM since yesterday much improved.     ROS:    All other systems negative    Constitutional: no fever, no chills  Head: no trauma  Eyes: no vision changes, no eye pain  ENT:  no sore throat, no rhinorrhea  Cardiovascular:  no chest pain, no palpitation  Respiratory:  no SOB, no cough  GI:  no abd pain, no vomiting,  diarrhea  urinary: no dysuria, no hematuria, no flank pain  musculoskeletal:  no joint pain, no joint swelling  skin:  no rash  neurology:  no headache, no seizure, no change in mental status  psych: no anxiety, no depression     Allergies  No Known Allergies        ANTIMICROBIALS:  imipenem/cilastatin  IVPB 500 every 8 hours  trimethoprim   80 mG/sulfamethoxazole 400 mG 1 daily  vancomycin    Solution 125 every 8 hours      OTHER MEDS:  MEDICATIONS  (STANDING):  acetaminophen   Tablet .. 650 every 6 hours PRN  aspirin enteric coated 81 daily  dextrose 40% Gel 15 once PRN  dextrose 50% Injectable 12.5 once  dextrose 50% Injectable 25 once  dextrose 50% Injectable 25 once  famotidine    Tablet 20 daily  glucagon  Injectable 1 once PRN  HYDROcodone/homatropine Syrup 5 every 8 hours PRN  insulin lispro (HumaLOG) corrective regimen sliding scale  three times a day before meals  insulin lispro (HumaLOG) corrective regimen sliding scale  at bedtime  pravastatin 20 at bedtime  predniSONE   Tablet 2 <User Schedule>  tacrolimus 2 <User Schedule>      Vital Signs Last 24 Hrs  T(C): 36.3 (17 Sep 2018 12:32), Max: 36.8 (16 Sep 2018 20:21)  T(F): 97.3 (17 Sep 2018 12:32), Max: 98.3 (16 Sep 2018 20:21)  HR: 76 (17 Sep 2018 12:32) (76 - 119)  BP: 120/86 (17 Sep 2018 12:32) (107/75 - 120/86)  BP(mean): --  RR: 18 (17 Sep 2018 12:32) (18 - 18)  SpO2: 98% (17 Sep 2018 12:32) (95% - 99%)    PHYSICAL EXAM:  General: non-toxic  HEAD/EYES: anicteric, PERRL  ENT:  supple  Cardiovascular:   S1, S2  Respiratory:  clear bilaterally  GI:  soft, non-tender, normal bowel sounds  :  no CVA tenderness   Musculoskeletal:  no synovitis  Neurologic:  grossly non-focal  Skin:  no rash  Lymph: no lymphadenopathy  Psychiatric:  appropriate affect  Vascular:  no phlebitis                                8.2    4.1   )-----------( 575      ( 17 Sep 2018 07:38 )             25.4       09-17    138  |  101  |  19  ----------------------------<  109<H>  4.0   |  24  |  0.98    Ca    8.9      17 Sep 2018 07:38  Mg     1.5     09-16            MICROBIOLOGY:  v  .Broncial  09-07-18   Moderate Rule Out Nocardia species  Sent to  [facility]   for Identification  Susceptibility to follow.  --  --      .Body Fluid Pleural Fluid  09-07-18   Moderate Rule Out Nocardia species  --    Upon re-evaluation of gram stain:  polymorphonuclear leukocytes seen per low power field  Gram Positive Rods  by cytocentrifuge  Previously reported as:  Moderate polymorphonuclear leukocytes per low power field  No organisms seen per oil power field      .Blood Blood  09-07-18   No growth at 5 days.  --  --      .Blood Blood-Peripheral  09-05-18   No growth at 5 days.  --  --      .Blood Blood-Venous  09-05-18   No growth at 5 days.  --  --        Toxoplasma IgG Screen: 4.7 IU/mL (06-14-18 @ 09:42)  HIV-1 RNA Quantitative, Viral Load Log: NOT DET. lg /mL (05-31-18 @ 19:10)  CMV IgG Antibody: 5.40 U/mL (05-25-18 @ 17:51)  CMV IgG Antibody: >10.00 U/mL (02-22-18 @ 23:45)  HIV-1 RNA Quantitative, Viral Load Log: NOT DET. lg /mL (02-02-18 @ 19:25)    CMVPCR Log: NotDetec LogIU/mL (09-14 @ 09:37)        RADIOLOGY:  < from: Xray Chest 1 View- PORTABLE-Routine (09.16.18 @ 05:38) >  IMPRESSION: The patient is status post sternotomy. The right axillary   line is noted as on the prior study. Adjacent surgical clips are present.   There is suggestion of trace loculated right pleural effusion associated   withright lower lung rounded and linear opacities predominantly   unchanged. There is no pneumothorax.    < end of copied text >

## 2018-09-18 ENCOUNTER — TRANSCRIPTION ENCOUNTER (OUTPATIENT)
Age: 68
End: 2018-09-18

## 2018-09-18 LAB
ANION GAP SERPL CALC-SCNC: 10 MMOL/L — SIGNIFICANT CHANGE UP (ref 5–17)
BUN SERPL-MCNC: 19 MG/DL — SIGNIFICANT CHANGE UP (ref 7–23)
CALCIUM SERPL-MCNC: 9.7 MG/DL — SIGNIFICANT CHANGE UP (ref 8.4–10.5)
CHLORIDE SERPL-SCNC: 101 MMOL/L — SIGNIFICANT CHANGE UP (ref 96–108)
CO2 SERPL-SCNC: 26 MMOL/L — SIGNIFICANT CHANGE UP (ref 22–31)
CREAT SERPL-MCNC: 1.1 MG/DL — SIGNIFICANT CHANGE UP (ref 0.5–1.3)
GLUCOSE BLDC GLUCOMTR-MCNC: 101 MG/DL — HIGH (ref 70–99)
GLUCOSE BLDC GLUCOMTR-MCNC: 103 MG/DL — HIGH (ref 70–99)
GLUCOSE BLDC GLUCOMTR-MCNC: 105 MG/DL — HIGH (ref 70–99)
GLUCOSE BLDC GLUCOMTR-MCNC: 108 MG/DL — HIGH (ref 70–99)
GLUCOSE SERPL-MCNC: 135 MG/DL — HIGH (ref 70–99)
HCT VFR BLD CALC: 28.6 % — LOW (ref 39–50)
HGB BLD-MCNC: 8.9 G/DL — LOW (ref 13–17)
MCHC RBC-ENTMCNC: 27.7 PG — SIGNIFICANT CHANGE UP (ref 27–34)
MCHC RBC-ENTMCNC: 31.3 GM/DL — LOW (ref 32–36)
MCV RBC AUTO: 88.5 FL — SIGNIFICANT CHANGE UP (ref 80–100)
PLATELET # BLD AUTO: 610 K/UL — HIGH (ref 150–400)
POTASSIUM SERPL-MCNC: 4.3 MMOL/L — SIGNIFICANT CHANGE UP (ref 3.5–5.3)
POTASSIUM SERPL-SCNC: 4.3 MMOL/L — SIGNIFICANT CHANGE UP (ref 3.5–5.3)
RBC # BLD: 3.23 M/UL — LOW (ref 4.2–5.8)
RBC # FLD: 20.3 % — HIGH (ref 10.3–14.5)
SODIUM SERPL-SCNC: 137 MMOL/L — SIGNIFICANT CHANGE UP (ref 135–145)
TACROLIMUS SERPL-MCNC: 14.9 NG/ML — SIGNIFICANT CHANGE UP
WBC # BLD: 3.9 K/UL — SIGNIFICANT CHANGE UP (ref 3.8–10.5)
WBC # FLD AUTO: 3.9 K/UL — SIGNIFICANT CHANGE UP (ref 3.8–10.5)

## 2018-09-18 PROCEDURE — 99232 SBSQ HOSP IP/OBS MODERATE 35: CPT

## 2018-09-18 PROCEDURE — 99232 SBSQ HOSP IP/OBS MODERATE 35: CPT | Mod: GC

## 2018-09-18 PROCEDURE — 99233 SBSQ HOSP IP/OBS HIGH 50: CPT

## 2018-09-18 RX ORDER — ACETAMINOPHEN 500 MG
650 TABLET ORAL EVERY 6 HOURS
Qty: 0 | Refills: 0 | Status: DISCONTINUED | OUTPATIENT
Start: 2018-09-18 | End: 2018-09-26

## 2018-09-18 RX ADMIN — Medication 1 TABLET(S): at 12:41

## 2018-09-18 RX ADMIN — MAGNESIUM OXIDE 400 MG ORAL TABLET 400 MILLIGRAM(S): 241.3 TABLET ORAL at 12:41

## 2018-09-18 RX ADMIN — SODIUM CHLORIDE 3 MILLILITER(S): 9 INJECTION INTRAMUSCULAR; INTRAVENOUS; SUBCUTANEOUS at 22:34

## 2018-09-18 RX ADMIN — SODIUM CHLORIDE 3 MILLILITER(S): 9 INJECTION INTRAMUSCULAR; INTRAVENOUS; SUBCUTANEOUS at 12:42

## 2018-09-18 RX ADMIN — Medication 2 MILLIGRAM(S): at 08:57

## 2018-09-18 RX ADMIN — Medication 650 MILLIGRAM(S): at 08:57

## 2018-09-18 RX ADMIN — Medication 20 MILLIGRAM(S): at 22:34

## 2018-09-18 RX ADMIN — Medication 125 MILLIGRAM(S): at 05:47

## 2018-09-18 RX ADMIN — Medication 650 MILLIGRAM(S): at 13:00

## 2018-09-18 RX ADMIN — MAGNESIUM OXIDE 400 MG ORAL TABLET 400 MILLIGRAM(S): 241.3 TABLET ORAL at 17:27

## 2018-09-18 RX ADMIN — Medication 81 MILLIGRAM(S): at 12:40

## 2018-09-18 RX ADMIN — IMIPENEM AND CILASTATIN 100 MILLIGRAM(S): 250; 250 INJECTION, POWDER, FOR SOLUTION INTRAVENOUS at 22:33

## 2018-09-18 RX ADMIN — IMIPENEM AND CILASTATIN 100 MILLIGRAM(S): 250; 250 INJECTION, POWDER, FOR SOLUTION INTRAVENOUS at 13:59

## 2018-09-18 RX ADMIN — Medication 125 MILLIGRAM(S): at 22:33

## 2018-09-18 RX ADMIN — Medication 650 MILLIGRAM(S): at 20:05

## 2018-09-18 RX ADMIN — FAMOTIDINE 20 MILLIGRAM(S): 10 INJECTION INTRAVENOUS at 12:40

## 2018-09-18 RX ADMIN — MAGNESIUM OXIDE 400 MG ORAL TABLET 400 MILLIGRAM(S): 241.3 TABLET ORAL at 08:57

## 2018-09-18 RX ADMIN — TACROLIMUS 1 MILLIGRAM(S): 5 CAPSULE ORAL at 08:58

## 2018-09-18 RX ADMIN — Medication 125 MILLIGRAM(S): at 13:59

## 2018-09-18 RX ADMIN — TACROLIMUS 1 MILLIGRAM(S): 5 CAPSULE ORAL at 22:34

## 2018-09-18 RX ADMIN — Medication 650 MILLIGRAM(S): at 13:30

## 2018-09-18 RX ADMIN — SODIUM CHLORIDE 3 MILLILITER(S): 9 INJECTION INTRAMUSCULAR; INTRAVENOUS; SUBCUTANEOUS at 05:41

## 2018-09-18 RX ADMIN — IMIPENEM AND CILASTATIN 100 MILLIGRAM(S): 250; 250 INJECTION, POWDER, FOR SOLUTION INTRAVENOUS at 05:47

## 2018-09-18 NOTE — PROGRESS NOTE ADULT - PROBLEM SELECTOR PLAN 2
s/p 9/7 Right lung FNA   ID following  as per ID Dr Lujan Imipenem/cilastin 500mg IV q8  abx  for  possible 6 months per HF    ertepenum daily for home

## 2018-09-18 NOTE — DISCHARGE NOTE ADULT - MEDICATION SUMMARY - MEDICATIONS TO CHANGE
I will SWITCH the dose or number of times a day I take the medications listed below when I get home from the hospital:    vancomycin 125 mg oral capsule  -- 1 cap(s) by mouth 2 times a day   -- Finish all this medication unless otherwise directed by prescriber.    predniSONE 1 mg oral tablet  -- 3 tab(s) by mouth once a day    tacrolimus 1 mg oral capsule  -- 1 cap(s) by mouth 2 times a day

## 2018-09-18 NOTE — DISCHARGE NOTE ADULT - MEDICATION SUMMARY - MEDICATIONS TO TAKE
I will START or STAY ON the medications listed below when I get home from the hospital:    predniSONE 1 mg oral tablet  -- 2 tab(s) by mouth once a day  -- Indication: For Heart transplant, orthotopic, status    acetaminophen 325 mg oral tablet  -- 2 tab(s) by mouth every 6 hours, As needed, Temp greater or equal to 38C (100.4F)/ Mild Pain  -- Indication: For Pain    aspirin 81 mg oral delayed release tablet  -- 1 tab(s) by mouth once a day  -- Indication: For Antiplatelet    sodium bicarbonate 650 mg oral tablet  -- 1 tab(s) by mouth   -- Indication: For Heart transplant, orthotopic, status    pravastatin 20 mg oral tablet  -- 1 tab(s) by mouth once a day (at bedtime)  -- Indication: For Cholesterol    imipenem-cilastatin  -- 500 milligram(s) intravenous every 8 hours  -- Indication: For nocardia infection    vancomycin 125 mg oral capsule  -- 1 cap(s) by mouth once a day   9/27 to 10/3  -- Indication: For Hx of c diff    vancomycin 125 mg oral capsule  -- 1 cap(s) by mouth every other day x 8 weeks  -- Indication: For Hx of c diff - start 10/4    famotidine 20 mg oral tablet  -- 1 tab(s) by mouth once a day  -- Indication: For Antacid    tacrolimus 1 mg oral capsule  -- 3 cap(s) by mouth   -- Indication: For Heart transplant, orthotopic, status    tacrolimus 0.5 mg oral capsule  -- 7 cap(s) by mouth   -- Indication: For Heart transplant, orthotopic, status    magnesium oxide 400 mg (241.3 mg elemental magnesium) oral tablet  -- 1 tab(s) by mouth 3 times a day (with meals)  -- Indication: For Supplement    atovaquone 750 mg/5 mL oral suspension  -- 10 milliliter(s) by mouth once a day  -- Indication: For Heart transplant, orthotopic, status    Calcitrate with D 315 mg-250 intl units oral tablet  -- 2 tab(s) by mouth 2 times a day  -- Indication: For Supplement I will START or STAY ON the medications listed below when I get home from the hospital:    predniSONE 1 mg oral tablet  -- 2 tab(s) by mouth once a day  -- Indication: For Heart transplant, orthotopic, status    acetaminophen 325 mg oral tablet  -- 2 tab(s) by mouth every 6 hours, As needed, Temp greater or equal to 38C (100.4F)/ Mild Pain  -- Indication: For Pain    aspirin 81 mg oral delayed release tablet  -- 1 tab(s) by mouth once a day  -- Indication: For Antiplatelet    sodium bicarbonate 650 mg oral tablet  -- 1 tab(s) by mouth   -- Indication: For Heart transplant, orthotopic, status    pravastatin 20 mg oral tablet  -- 1 tab(s) by mouth once a day (at bedtime)  -- Indication: For Cholesterol    imipenem-cilastatin  -- 500 milligram(s) intravenous every 8 hours  -- Indication: For nocardia infection    vancomycin 125 mg oral capsule  -- 1 cap(s) by mouth once a day   9/27 to 10/3  -- Indication: For Hx of c diff    vancomycin 125 mg oral capsule  -- 1 cap(s) by mouth every other day x 8 weeks start 10/4  -- Indication: For Hx of c diff    famotidine 20 mg oral tablet  -- 1 tab(s) by mouth once a day  -- Indication: For Antacid    tacrolimus 0.5 mg oral capsule  -- 7 cap(s) by mouth 2 times a day 8AM and 8PM  -- Indication: For Heart transplant, orthotopic, status     magnesium oxide 400 mg (241.3 mg elemental magnesium) oral tablet  -- 1 tab(s) by mouth 3 times a day (with meals)  -- Indication: For Supplement    atovaquone 750 mg/5 mL oral suspension  -- 10 milliliter(s) by mouth once a day  -- Indication: For Heart transplant, orthotopic, status    Calcitrate with D 315 mg-250 intl units oral tablet  -- 2 tab(s) by mouth 2 times a day  -- Indication: For Supplement

## 2018-09-18 NOTE — PROGRESS NOTE ADULT - PROBLEM SELECTOR PLAN 1
d/w  HF  tac level   18.3  yesterday  Continue prednisone 2mg daily  sodium bicarb BID.  Continue asa and statin.  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

## 2018-09-18 NOTE — DISCHARGE NOTE ADULT - REASON FOR ADMISSION
readmit   sp  heart transplant    Nocardia  Rt lung bx  and cdif readmit 9/5 with Diarrhea and fevers s/p 2/18 heart transplant

## 2018-09-18 NOTE — PROGRESS NOTE ADULT - PROBLEM SELECTOR PLAN 2
- Tacrolimus level yesterday 18.3. Will adjust dose pending today's level.    - Decrease tacrolimus to 1 mg PO BID.  - Continue prednisone 2 mg daily.    - Continue off of Cellcept.

## 2018-09-18 NOTE — PROGRESS NOTE ADULT - NSHPATTENDINGPLANDISCUSS_GEN_ALL_CORE
team
patient
transplant team
Dr. Stahl
cts rounds
team
cts rounds
cts rounds
CTS team
CTS team
CTs team this am
cts rounds
Team, ID consult team
team
Team, ID consult team

## 2018-09-18 NOTE — PROGRESS NOTE ADULT - ASSESSMENT
67 yo M s/p heart transplant complicated by rejection with rounds of plasmaphoresis , IVIG, rituximab x2, known colonizer with  Pseudomonas was treated for PNA in June 2018  and again in August 14-30 with cefepime and voriconazole as well as c.diff with PO vanco presented this admission with decreased PO intake, 4 watery BM daily and a productive cough. Pt had CT guided R lung mass bx on 8/17 which showed acute inflammation but was negative bacterial or fungal etiology. Concerned now for cdiff colitis vs CMV colitis.     last admission labs:   First CT guided biopsy cultures are NGTD- only grew a corynebacterium that is likely not a pathogen  Spoke with the micro. 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  He is responding again to C.diff treatment; switched from cefepime to imipenem for concern of nocardia    Suggest:  -Continue PO Vancomycin for cdiff- on taper (125 mg orally twice daily for 7 days, then 125 mg orally once daily for 7 days, then 125 mg orally every 2 or 3 days for 8 weeks)  -blood cultures - NGTD  -BAL now changed to nocardia- possible cefepime is partially treating nocardia and that's why pt improving in btwn hospital visits vs pseudomonal pna- changed cefepime to imipenem   -Upon discharge, patient will be on Ertapenem 1gram daily and will need to follow up in Transplant and ID clinic for labs and follow up of Nocardia sensitivities   -Monitor CrCl while on abx  -Continue Bactrim SS daily for prophylaxis   -We discontinued the Voriconazole (no fungus isolated)- Tacrolimus levels will need to be adjusted  -Pt s/p IR biopsy-specimen sent for culture, fungal, AFB, and nocardia and pathology--> AFB negative, awaiting other cultures (Nocardia culture and sensitivities sent to [facility])

## 2018-09-18 NOTE — DISCHARGE NOTE ADULT - PLAN OF CARE
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 - continue Vanco taper for C.diff (125 mg orally twice daily for 7 days from 9/20-9/26 , then 125 mg orally once daily for 7 days 9/27-10/3, then 125 mg orally every 2 or 3 days for 8 weeks) Shower daily  Daily weight  Check daily tacrolimus level and dose tacrolimus for goal 8-10. Vera Villasenor to manage tacrolimus dosing at Rehab.  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  Check weekly CBC and CMP. Fax results to Dr. Tai GONZALEZ  [phone]. Shower daily  Daily weight  Check daily tacrolimus level prior to AM dose at 0730 and dose tacrolimus for goal 8-10. Vera Villasenor NP to manage tacrolimus dosing at Rehab.  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  Check weekly CBC and CMP. Fax results to Dr. Tai GONZALEZ  [phone].

## 2018-09-18 NOTE — PROGRESS NOTE ADULT - SUBJECTIVE AND OBJECTIVE BOX
Subjective: No current complaints. He feels well. No cough or fevers. No diarrhea.     Medications:  acetaminophen   Tablet .. 650 milliGRAM(s) Oral every 6 hours PRN  aspirin enteric coated 81 milliGRAM(s) Oral daily  Calcium citrate + vit d3 315mg/250 IU 2 Tablet(s) 2 Tablet(s) Oral two times a day  dextrose 40% Gel 15 Gram(s) Oral once PRN  dextrose 5%. 1000 milliLiter(s) IV Continuous <Continuous>  dextrose 50% Injectable 12.5 Gram(s) IV Push once  dextrose 50% Injectable 25 Gram(s) IV Push once  dextrose 50% Injectable 25 Gram(s) IV Push once  famotidine    Tablet 20 milliGRAM(s) Oral daily  glucagon  Injectable 1 milliGRAM(s) IntraMuscular once PRN  imipenem/cilastatin  IVPB 500 milliGRAM(s) IV Intermittent every 8 hours  insulin lispro (HumaLOG) corrective regimen sliding scale   SubCutaneous three times a day before meals  insulin lispro (HumaLOG) corrective regimen sliding scale   SubCutaneous at bedtime  magnesium oxide 400 milliGRAM(s) Oral three times a day with meals  multivitamin 1 Tablet(s) Oral daily  pravastatin 20 milliGRAM(s) Oral at bedtime  predniSONE   Tablet 2 milliGRAM(s) Oral <User Schedule>  sodium bicarbonate 650 milliGRAM(s) Oral <User Schedule>  sodium chloride 0.9% lock flush 3 milliLiter(s) IV Push every 8 hours  tacrolimus 1 milliGRAM(s) Oral <User Schedule>  trimethoprim   80 mG/sulfamethoxazole 400 mG 1 Tablet(s) Oral daily  vancomycin    Solution 125 milliGRAM(s) Oral every 8 hours      Physical Exam:    Vital Signs Last 24 Hrs  T(C): 36.7 (17 Sep 2018 22:53), Max: 36.7 (17 Sep 2018 22:53)  T(F): 98.1 (17 Sep 2018 22:53), Max: 98.1 (17 Sep 2018 22:53)  HR: 110 (17 Sep 2018 22:53) (76 - 115)  BP: 130/88 (17 Sep 2018 22:53) (116/77 - 130/88)  RR: 18 (17 Sep 2018 22:53) (18 - 18)  SpO2: 94% (17 Sep 2018 22:53) (94% - 98%)    Daily Weight in k.8 (18 Sep 2018 08:54)    I&O's Summary    17 Sep 2018 07:01  -  18 Sep 2018 07:00  --------------------------------------------------------  IN: 480 mL / OUT: 650 mL / NET: -170 mL    General: No distress. Comfortable.  HEENT: EOM intact.  Neck: Neck supple. JVP not elevated. No masses  Chest: Clear to auscultation bilaterally  CV: Normal S1 and S2. III/VI diastolic murmur across precordium.  No rubs or gallops. Radial pulses normal.  Abdomen: Soft, non-distended, non-tender  Skin: No rashes   Neurology: Alert and oriented times three. Sensation intact  Psych: Affect normal    Labs:                        8.9    3.9   )-----------( 610      ( 18 Sep 2018 09:30 )             28.6         137  |  101  |  19  ----------------------------<  135<H>  4.3   |  26  |  1.10    Ca    9.7      18 Sep 2018 09:30    Tacrolimus (), Serum: 18.3: Tacrolimus testing is performed on the Abbott  by  chemiluminescent microparticle immunoassay. The therapeutic range of  tacrolimus is not clearly defined but target 12-hour trough whole blood  concentrations are 5.0 - 20.0 ng/mL early post transplant. Twenty-four  hour  trough concentrations are 33-50% less than the corresponding 12-hour  trough. ng/mL (18 @ 09:22)

## 2018-09-18 NOTE — DISCHARGE NOTE ADULT - CONDITIONS AT DISCHARGE
Pt discharged to rehab. Incision C/D/I. IVL D/C'd site benign. Pt without any complaints. VSS. Discharge instructions given and understood by patient. Will cont to monitor pt status. patient verbalize understanding of discharge instructions

## 2018-09-18 NOTE — PROGRESS NOTE ADULT - SUBJECTIVE AND OBJECTIVE BOX
Follow Up:  69 yo M heart tranplant admitted for cdiff and Nocardia Pneumonia    Interval History/ROS:     All other systems negative    Constitutional: no fever, no chills  Head: no trauma  Eyes: no vision changes, no eye pain  ENT:  no sore throat, no rhinorrhea  Cardiovascular:  no chest pain, no palpitation  Respiratory:  no SOB, no cough  GI:  no abd pain, no vomiting,  diarrhea  urinary: no dysuria, no hematuria, no flank pain  musculoskeletal:  no joint pain, no joint swelling  skin:  no rash  neurology:  no headache, no seizure, no change in mental status  psych: no anxiety, no depression       Allergies  No Known Allergies        ANTIMICROBIALS:  imipenem/cilastatin  IVPB 500 every 8 hours  trimethoprim   80 mG/sulfamethoxazole 400 mG 1 daily  vancomycin    Solution 125 every 8 hours      OTHER MEDS:  MEDICATIONS  (STANDING):  acetaminophen   Tablet .. 650 every 6 hours PRN  aspirin enteric coated 81 daily  dextrose 40% Gel 15 once PRN  dextrose 50% Injectable 12.5 once  dextrose 50% Injectable 25 once  dextrose 50% Injectable 25 once  famotidine    Tablet 20 daily  glucagon  Injectable 1 once PRN  insulin lispro (HumaLOG) corrective regimen sliding scale  three times a day before meals  insulin lispro (HumaLOG) corrective regimen sliding scale  at bedtime  pravastatin 20 at bedtime  predniSONE   Tablet 2 <User Schedule>  tacrolimus 1 <User Schedule>      Vital Signs Last 24 Hrs  T(C): 36.7 (17 Sep 2018 22:53), Max: 36.7 (17 Sep 2018 22:53)  T(F): 98.1 (17 Sep 2018 22:53), Max: 98.1 (17 Sep 2018 22:53)  HR: 110 (17 Sep 2018 22:53) (76 - 115)  BP: 130/88 (17 Sep 2018 22:53) (116/77 - 130/88)  BP(mean): --  RR: 18 (17 Sep 2018 22:53) (18 - 18)  SpO2: 94% (17 Sep 2018 22:53) (94% - 98%)    PHYSICAL EXAM:  General: non-toxic  HEAD/EYES: anicteric, PERRL  ENT:  supple  Cardiovascular:   S1, S2  Respiratory:  clear bilaterally  GI:  soft, non-tender, normal bowel sounds  :  no CVA tenderness   Musculoskeletal:  no synovitis  Neurologic:  grossly non-focal  Skin:  no rash  Lymph: no lymphadenopathy  Psychiatric:  appropriate affect  Vascular:  no phlebitis                          8.9    3.9   )-----------( 610      ( 18 Sep 2018 09:30 )             28.6       09-17    138  |  101  |  19  ----------------------------<  109<H>  4.0   |  24  |  0.98    Ca    8.9      17 Sep 2018 07:38      MICROBIOLOGY:  v  .Broncial  09-07-18   Moderate Rule Out Nocardia species  Sent to  [facility]   for Identification  Susceptibility to follow.  --  --      .Body Fluid Pleural Fluid  09-07-18   Moderate Rule Out Nocardia species  --    Upon re-evaluation of gram stain:  polymorphonuclear leukocytes seen per low power field  Gram Positive Rods  by cytocentrifuge  Previously reported as:  Moderate polymorphonuclear leukocytes per low power field  No organisms seen per oil power field      .Blood Blood  09-07-18   No growth at 5 days.  --  --      .Blood Blood-Peripheral  09-05-18   No growth at 5 days.  --  --      .Blood Blood-Venous  09-05-18   No growth at 5 days.  --  --        Toxoplasma IgG Screen: 4.7 IU/mL (06-14-18 @ 09:42)  HIV-1 RNA Quantitative, Viral Load Log: NOT DET. lg /mL (05-31-18 @ 19:10)  CMV IgG Antibody: 5.40 U/mL (05-25-18 @ 17:51)  CMV IgG Antibody: >10.00 U/mL (02-22-18 @ 23:45)  HIV-1 RNA Quantitative, Viral Load Log: NOT DET. lg /mL (02-02-18 @ 19:25)    CMVPCR Log: NotDetec LogIU/mL (09-14 @ 09:37)        RADIOLOGY:   < from: Xray Chest 1 View- PORTABLE-Routine (09.17.18 @ 05:41) >  IMPRESSION:     Peripheral right lower lobe opacity, unchanged. Please correlate to FNA   dated 9/7/2018.  Right basilar atelectasis.    < end of copied text >

## 2018-09-18 NOTE — CHART NOTE - NSCHARTNOTEFT_GEN_A_CORE
Source: Patient [ X]    Family [ ]     other [ X] RN, Medical record,     Pt seen for nutritional follow up. Pt seen, reports feeling well and disliked salt free almonds. Pt requesting ice cream, reviewed BG levels and will discuss with NP. Pt reports being eager to go home. Pt verbalized good understanding of Heart health and food safety diet education and denies need for additional written materials or reinforced diet education.     Chart reviewed, events noted. Pt admitted for fever and diarrhea. Per chart, Pt with history of heart transplant and recurrent C-Diff. Per chart. s/p Lung Bx and per ID ? if diarrhea is related to immunosuppression induced.  Pt with Pulmonary nocardiosis. C-Diff+. Noted with hypomagnesemiam, being supplemented.      Diet : Consistent CHO      Patient reports 1 BM thus fart today      PO intake:  %   Source for PO intake [X ] Patient [ ] family [ ] chart [ ] staff [ ] other          Current Weight: 69.8kg. Pt is weight stable     Pertinent Medications: MEDICATIONS  (STANDING):  aspirin enteric coated 81 milliGRAM(s) Oral daily  Calcium citrate + vit d3 315mg/250 IU 2 Tablet(s) 2 Tablet(s) Oral two times a day  dextrose 5%. 1000 milliLiter(s) (50 mL/Hr) IV Continuous <Continuous>  dextrose 50% Injectable 12.5 Gram(s) IV Push once  dextrose 50% Injectable 25 Gram(s) IV Push once  dextrose 50% Injectable 25 Gram(s) IV Push once  famotidine    Tablet 20 milliGRAM(s) Oral daily  imipenem/cilastatin  IVPB 500 milliGRAM(s) IV Intermittent every 8 hours  insulin lispro (HumaLOG) corrective regimen sliding scale   SubCutaneous three times a day before meals  insulin lispro (HumaLOG) corrective regimen sliding scale   SubCutaneous at bedtime  magnesium oxide 400 milliGRAM(s) Oral three times a day with meals  multivitamin 1 Tablet(s) Oral daily  pravastatin 20 milliGRAM(s) Oral at bedtime  predniSONE   Tablet 2 milliGRAM(s) Oral <User Schedule>  sodium bicarbonate 650 milliGRAM(s) Oral <User Schedule>  sodium chloride 0.9% lock flush 3 milliLiter(s) IV Push every 8 hours  tacrolimus 1 milliGRAM(s) Oral <User Schedule>  trimethoprim   80 mG/sulfamethoxazole 400 mG 1 Tablet(s) Oral daily  vancomycin    Solution 125 milliGRAM(s) Oral every 8 hours    MEDICATIONS  (PRN):  acetaminophen   Tablet .. 650 milliGRAM(s) Oral every 6 hours PRN Temp greater or equal to 38C (100.4F)  acetaminophen   Tablet .. 650 milliGRAM(s) Oral every 6 hours PRN Mild Pain (1 - 3)  dextrose 40% Gel 15 Gram(s) Oral once PRN Blood Glucose LESS THAN 70 milliGRAM(s)/deciliter  glucagon  Injectable 1 milliGRAM(s) IntraMuscular once PRN Glucose LESS THAN 70 milligrams/deciliter    Pertinent Labs:  Hgb/Hct:8.9/28.6-low, Na:137, K:4.3, Cl:101, BUN:19, Cr:1.10, Glucose:135-high, Ca:9.7, Bg levels: 9/17: , 9/18: 103-105    Skin: intact    Estimated Needs:   [X ] no change since previous assessment  [ ] recalculated:       Previous Nutrition Diagnosis:      [X ]Inadequate Oral Intake         Nutrition Diagnosis is [X ] ongoing; being addressed with food as pt continues to refuse PO supplements          New Nutrition Diagnosis: [X ] not applicable       Interventions:     Recommend    1. Provide food preferences as requested by Pt/family within diet restrictions    2. Encourage PO intake during meal times   3. Reviewed menu ordering procedures   4. Trend BG levels, renal indices, LFT's and electrolytes        Monitoring and Evaluation:     [X ] PO intake [X ] Tolerance to diet prescription [X ] weights [ X] follow up per protocol    [X ] other: RD remains available Sarah Siegler RD, [facility] Pager #[phone] Source: Patient [ X]    Family [ ]     other [ X] RN, Medical record,     Pt seen for nutritional follow up. Pt seen, reports feeling well and disliked salt free almonds. Pt requesting ice cream, reviewed BG levels and will discuss with NP. Pt reports being eager to go home. Pt verbalized good understanding of Heart health and food safety diet education and denies need for additional written materials or reinforced diet education.     Chart reviewed, events noted. Pt admitted for fever and diarrhea. Per chart, Pt with history of heart transplant and recurrent C-Diff. Per chart. s/p Lung Bx and per ID ? if diarrhea is related to immunosuppression induced.  Pt with Pulmonary nocardiosis. C-Diff+. Noted with hypomagnesemiam, being supplemented.      Diet : Consistent CHO      Patient reports 1 BM thus far today      PO intake:  %   Source for PO intake [X ] Patient [ ] family [ ] chart [ ] staff [ ] other          Current Weight: 69.8kg. Pt is weight stable     Pertinent Medications: MEDICATIONS  (STANDING):  aspirin enteric coated 81 milliGRAM(s) Oral daily  Calcium citrate + vit d3 315mg/250 IU 2 Tablet(s) 2 Tablet(s) Oral two times a day  dextrose 5%. 1000 milliLiter(s) (50 mL/Hr) IV Continuous <Continuous>  dextrose 50% Injectable 12.5 Gram(s) IV Push once  dextrose 50% Injectable 25 Gram(s) IV Push once  dextrose 50% Injectable 25 Gram(s) IV Push once  famotidine    Tablet 20 milliGRAM(s) Oral daily  imipenem/cilastatin  IVPB 500 milliGRAM(s) IV Intermittent every 8 hours  insulin lispro (HumaLOG) corrective regimen sliding scale   SubCutaneous three times a day before meals  insulin lispro (HumaLOG) corrective regimen sliding scale   SubCutaneous at bedtime  magnesium oxide 400 milliGRAM(s) Oral three times a day with meals  multivitamin 1 Tablet(s) Oral daily  pravastatin 20 milliGRAM(s) Oral at bedtime  predniSONE   Tablet 2 milliGRAM(s) Oral <User Schedule>  sodium bicarbonate 650 milliGRAM(s) Oral <User Schedule>  sodium chloride 0.9% lock flush 3 milliLiter(s) IV Push every 8 hours  tacrolimus 1 milliGRAM(s) Oral <User Schedule>  trimethoprim   80 mG/sulfamethoxazole 400 mG 1 Tablet(s) Oral daily  vancomycin    Solution 125 milliGRAM(s) Oral every 8 hours    MEDICATIONS  (PRN):  acetaminophen   Tablet .. 650 milliGRAM(s) Oral every 6 hours PRN Temp greater or equal to 38C (100.4F)  acetaminophen   Tablet .. 650 milliGRAM(s) Oral every 6 hours PRN Mild Pain (1 - 3)  dextrose 40% Gel 15 Gram(s) Oral once PRN Blood Glucose LESS THAN 70 milliGRAM(s)/deciliter  glucagon  Injectable 1 milliGRAM(s) IntraMuscular once PRN Glucose LESS THAN 70 milligrams/deciliter    Pertinent Labs:  Hgb/Hct:8.9/28.6-low, Na:137, K:4.3, Cl:101, BUN:19, Cr:1.10, Glucose:135-high, Ca:9.7, Bg levels: 9/17: , 9/18: 103-105    Skin: intact    Estimated Needs:   [X ] no change since previous assessment  [ ] recalculated:       Previous Nutrition Diagnosis:      [X ]Inadequate Oral Intake         Nutrition Diagnosis is [X ] ongoing; being addressed with food as pt continues to refuse PO supplements          New Nutrition Diagnosis: [X ] not applicable       Interventions:     Recommend    1. Provide food preferences as requested by Pt/family within diet restrictions    2. Encourage PO intake during meal times   3. Reviewed menu ordering procedures   4. Trend BG levels, renal indices, LFT's and electrolytes        Monitoring and Evaluation:     [X ] PO intake [X ] Tolerance to diet prescription [X ] weights [ X] follow up per protocol    [X ] other: RD remains available Sarah Siegler RD, [facility] Pager #[phone]

## 2018-09-18 NOTE — DISCHARGE NOTE ADULT - PROVIDER TOKENS
TOKJESSE:'[id number]' TOKEN:'[id number]:'[id number]:'[id number]'

## 2018-09-18 NOTE — DISCHARGE NOTE ADULT - OTHER SIGNIFICANT FINDINGS
spent 30 min on discharge PHYSICAL EXAM  Neurology: A&Ox3, nonfocal, no gross deficits  CV : RRR+S1S2  Lungs: Respirations non-labored, B/L BS CTA  Abdomen: Soft, NT/ND, +BSx4Q, +BM (-)N/V/D  : Voiding without difficulty  Extremities: B/L LE no edema, negative calf tenderness, +PP     More than 30 mins spent on total encounter, patient counseling and discharge instructions.

## 2018-09-18 NOTE — PROGRESS NOTE ADULT - ASSESSMENT
Chart check completed    67 y/o male with PMH NICM HFrEF s/p HM2 LVAD 6/2017, who underwent heart transplant on 2/23/18 (CMV D-/R+ and Toxo D-/R+, Hep C+ heart) with post op graft dysfunction who underwent plasmapheresis and IVIG x2 as well as rituximab, with suspected fungal PNA diagnosed 6/2018  treated w voriconazole, recurrent PNA with new RUL infiltrate on CT scan 8/18, which  improved on broad spectrum antibiotics. S/p lung biopsy 8/18. C diff toxin positive -still  on oral vanco,.  Patient presents today for RHC and cardiac biopsy with c/o ongoing diarrhea (3-4 loose BM daily) and mildly productive cough, denies fevers, chills, abd pain, N/V. EKG demonstrated ST at 133bpm. Pt readmitted for further workup.  9/6 one formed BM this am; CMV study's pending, BC pending afebrile, albumin 500 cc for dehydration  potassium supplemented, transition to regular diet.   9/7 VVS; underwent IR procedure of Right lung mass FNA.  Tolerated procedure well.  Culture pending. Vanco IV D/C per ID. Continue with current medication regimen. Send Legionella urine culture and check voriconazole (T) in AM   Disposition: Home once medically cleared   9/8 + fever x1 101.7 as per ID cefepine decreased to 2g bid ivpb  9/9 VSS. 1 liquid/soft BM. Heme consulted for neutropenia possible bone marrow Bx. Deferring Bx at this time until anemia work up complete. Tacro 11.8. F/U HF recs  9/10 VSS 1loos liquid BM needs Bone marrow biopsy Tacro11.5  Hycodan for cough  9/11 VSS B/L UE Duplex for  assessment  will need PICC long term abx - found to have Nocardia?- MRI brain  to r/o abcess  9/12 PICC placed xray pending  MRI for today   ABX per ID mipenem/cilastin 500mg IV q8  9/13 MRI brain done pending results , vanco po decreased to q8h lasix 20mg ivx1   9/14 no active issues  cmv neg asperg neg   9/16    Tac levek   27.2   will d/c HF team  OOB to chair   VSS  9/17   VSS    on prograf 2 mg BID   dw   Dr Lujan  pt to neeed potential off 6 months abx for Nocardia  in lung    + rt antecub  midline  9/18   VSS   OOb to chair,  hopeful d.c home today with IV  abx

## 2018-09-18 NOTE — DISCHARGE NOTE ADULT - PATIENT PORTAL LINK FT
You can access the [facility] Patient Portal, offered by [facility], by registering with the following website: [email or web address]

## 2018-09-18 NOTE — PROGRESS NOTE ADULT - SUBJECTIVE AND OBJECTIVE BOX
VITAL SIGNS    Telemetry:  sr  110    Vital Signs Last 24 Hrs  T(C): 36.7 (09-17-18 @ 22:53), Max: 36.7 (09-17-18 @ 22:53)  T(F): 98.1 (09-17-18 @ 22:53), Max: 98.1 (09-17-18 @ 22:53)  HR: 110 (09-17-18 @ 22:53) (76 - 115)  BP: 130/88 (09-17-18 @ 22:53) (116/77 - 130/88)  RR: 18 (09-17-18 @ 22:53) (18 - 18)  SpO2: 94% (09-17-18 @ 22:53) (94% - 98%)             Daily         CAPILLARY BLOOD GLUCOSE      POCT Blood Glucose.: 105 mg/dL (18 Sep 2018 07:51)  POCT Blood Glucose.: 85 mg/dL (17 Sep 2018 21:34)  POCT Blood Glucose.: 114 mg/dL (17 Sep 2018 19:13)  POCT Blood Glucose.: 116 mg/dL (17 Sep 2018 11:44)                        PHYSICAL EXAM    Neurology: alert and oriented x 3, moves all extremities with no defecits  CV :  RRR  Sternal Wound :  CDI , Stable  healed  Lungs:   CTA B/L  Abdomen: soft, nontender, nondistended, positive bowel sounds, last bowel movement    9/16  Extremities:     trace  le edema   no calf tenderness

## 2018-09-18 NOTE — DISCHARGE NOTE ADULT - INSTRUCTIONS
reg   diet Regular diet - low fat, low cholesterol, no added salt. Diabetic diet - low fat, low cholesterol, no added salt.

## 2018-09-18 NOTE — PROGRESS NOTE ADULT - PROBLEM SELECTOR PLAN 1
- Continue Imipenem per ID recommendations. Plan will be for total of six months of therapy, which will transition to Ertapenem at discharge.

## 2018-09-18 NOTE — PROGRESS NOTE ADULT - ATTENDING COMMENTS
Patient seen and examined with Dr. Perez  I agree with his interval history exam findings and plans as noted above    Patient afebrile and without cough on treatment for Nocardia pneumonia with Imipenem    Will need repeat chest CT imaging to look for improvement in infiltrates  Home care arrangements in progress    Gary Lujan MD  [phone]  After 5pm/weekends [phone]

## 2018-09-18 NOTE — PROGRESS NOTE ADULT - ASSESSMENT
Mr. 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 He also has had persistent C. diff diarrhea and continues on therapy. He has no evidence of graft dysfunction on exam and he is euvolemic. Plan for discharge today.     Please call me with questions at [phone]. Discussed with [facility] NP team and Dr. Parker.

## 2018-09-18 NOTE — DISCHARGE NOTE ADULT - CARE PROVIDER_API CALL
Ti Parker), Thoracic and Cardiac Surgery  [address] [place] [address]  Phone: [phone]  Fax: [phone] Edil Stahl (MD; MPH), Adv Heart Fail Trnsplnt Cardio; Cardiology  [address] [place] [address]  Phone: [phone]  Fax: [phone]    Sujey Lujan), Infectious Disease; Internal Medicine  [address] [place] [id number]  Phone: [phone]  Fax: [phone]    Vera Villasenor (NP; RN), NP in [facility]  [address] [place] [address]  Phone: [phone]  Fax: [phone]

## 2018-09-18 NOTE — DISCHARGE NOTE ADULT - CARE PROVIDERS DIRECT ADDRESSES
[email or web address]

## 2018-09-18 NOTE — DISCHARGE NOTE ADULT - MEDICATION SUMMARY - MEDICATIONS TO STOP TAKING
I will STOP taking the medications listed below when I get home from the hospital:    Multiple Vitamins oral tablet  -- 1 tab(s) by mouth once a day    voriconazole 200 mg oral tablet  -- 1 tab(s) by mouth every 12 hours    Colace 100 mg oral capsule  -- 2 cap(s) by mouth once a day    repaglinide 0.5 mg oral tablet  -- 1 tab(s) by mouth 3 times a day (before meals)    sodium polystyrene sulfonate 15 g/60 mL oral suspension  -- 1 dose(s) by mouth once a day, As Needed  IF and when directed by heart transplant team

## 2018-09-18 NOTE — DISCHARGE NOTE ADULT - CARE PLAN
Principal Discharge DX:	H/O heart transplant  Goal:	recovery  Assessment and plan of treatment:	shower daiy  Secondary Diagnosis:	Opacity of lung on imaging study  Goal:	recovery  Assessment and plan of treatment:	IV  abx   prolonged Principal Discharge DX:	H/O heart transplant  Goal:	recovery  Assessment and plan of treatment:	shower daily  Secondary Diagnosis:	Opacity of lung on imaging study  Goal:	recovery  Assessment and plan of treatment:	IV  abx   prolonged total 6 months Principal Discharge DX:	H/O heart transplant  Goal:	recovery  Assessment and plan of treatment:	Shower daily  Daily weight  Take medications as directed  Activity as tolerated  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  Secondary Diagnosis:	Clostridium difficile diarrhea  Goal:	recovery  Assessment and plan of treatment:	- continue Vanco taper for C.diff (125 mg orally twice daily for 7 days from 9/20-9/26 , then 125 mg orally once daily for 7 days 9/27-10/3, then 125 mg orally every 2 or 3 days for 8 weeks) Principal Discharge DX:	H/O heart transplant  Goal:	recovery  Assessment and plan of treatment:	Shower daily  Daily weight  Check daily tacrolimus level and dose tacrolimus for goal 8-10. Vera Villasenor to manage tacrolimus dosing at Rehab.  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  Check weekly CBC and CMP. Fax results to Dr. Tai GONZALEZ  [phone].  Secondary Diagnosis:	Clostridium difficile diarrhea  Goal:	recovery  Assessment and plan of treatment:	- continue Vanco taper for C.diff (125 mg orally twice daily for 7 days from 9/20-9/26 , then 125 mg orally once daily for 7 days 9/27-10/3, then 125 mg orally every 2 or 3 days for 8 weeks) Principal Discharge DX:	H/O heart transplant  Goal:	recovery  Assessment and plan of treatment:	Shower daily  Daily weight  Check daily tacrolimus level prior to AM dose at 0730 and dose tacrolimus for goal 8-10. Vera Villasenor NP to manage tacrolimus dosing at Rehab.  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  Check weekly CBC and CMP. Fax results to Dr. Tai GONZALEZ  [phone].  Secondary Diagnosis:	Clostridium difficile diarrhea  Goal:	recovery  Assessment and plan of treatment:	- continue Vanco taper for C.diff (125 mg orally twice daily for 7 days from 9/20-9/26 , then 125 mg orally once daily for 7 days 9/27-10/3, then 125 mg orally every 2 or 3 days for 8 weeks)

## 2018-09-18 NOTE — DISCHARGE NOTE ADULT - ADDITIONAL INSTRUCTIONS
HF clinic  follow up  and Dr Parker Follow up with Dr. Stahl after discharge from Rehab at Transplant Clinic at [facility] in [address] [place] [address]. Call [phone] to schedule appointment.  Follow up with infectious disease Dr. Lujan after discharge from Rehab. Call [phone] to schedule appointment.

## 2018-09-19 LAB
ANION GAP SERPL CALC-SCNC: 10 MMOL/L — SIGNIFICANT CHANGE UP (ref 5–17)
BUN SERPL-MCNC: 16 MG/DL — SIGNIFICANT CHANGE UP (ref 7–23)
CALCIUM SERPL-MCNC: 9.7 MG/DL — SIGNIFICANT CHANGE UP (ref 8.4–10.5)
CHLORIDE SERPL-SCNC: 100 MMOL/L — SIGNIFICANT CHANGE UP (ref 96–108)
CO2 SERPL-SCNC: 26 MMOL/L — SIGNIFICANT CHANGE UP (ref 22–31)
CREAT SERPL-MCNC: 1.26 MG/DL — SIGNIFICANT CHANGE UP (ref 0.5–1.3)
GLUCOSE BLDC GLUCOMTR-MCNC: 103 MG/DL — HIGH (ref 70–99)
GLUCOSE BLDC GLUCOMTR-MCNC: 112 MG/DL — HIGH (ref 70–99)
GLUCOSE BLDC GLUCOMTR-MCNC: 138 MG/DL — HIGH (ref 70–99)
GLUCOSE BLDC GLUCOMTR-MCNC: 95 MG/DL — SIGNIFICANT CHANGE UP (ref 70–99)
GLUCOSE SERPL-MCNC: 121 MG/DL — HIGH (ref 70–99)
HCT VFR BLD CALC: 24.7 % — LOW (ref 39–50)
HGB BLD-MCNC: 8.6 G/DL — LOW (ref 13–17)
MCHC RBC-ENTMCNC: 30.7 PG — SIGNIFICANT CHANGE UP (ref 27–34)
MCHC RBC-ENTMCNC: 34.8 GM/DL — SIGNIFICANT CHANGE UP (ref 32–36)
MCV RBC AUTO: 88.2 FL — SIGNIFICANT CHANGE UP (ref 80–100)
PLATELET # BLD AUTO: 616 K/UL — HIGH (ref 150–400)
POTASSIUM SERPL-MCNC: 4.7 MMOL/L — SIGNIFICANT CHANGE UP (ref 3.5–5.3)
POTASSIUM SERPL-SCNC: 4.7 MMOL/L — SIGNIFICANT CHANGE UP (ref 3.5–5.3)
RBC # BLD: 2.8 M/UL — LOW (ref 4.2–5.8)
RBC # FLD: 20.3 % — HIGH (ref 10.3–14.5)
SODIUM SERPL-SCNC: 136 MMOL/L — SIGNIFICANT CHANGE UP (ref 135–145)
TACROLIMUS SERPL-MCNC: 10.5 NG/ML — SIGNIFICANT CHANGE UP
WBC # BLD: 3.5 K/UL — LOW (ref 3.8–10.5)
WBC # FLD AUTO: 3.5 K/UL — LOW (ref 3.8–10.5)

## 2018-09-19 PROCEDURE — 99232 SBSQ HOSP IP/OBS MODERATE 35: CPT | Mod: GC

## 2018-09-19 PROCEDURE — 99233 SBSQ HOSP IP/OBS HIGH 50: CPT | Mod: GC

## 2018-09-19 RX ADMIN — IMIPENEM AND CILASTATIN 100 MILLIGRAM(S): 250; 250 INJECTION, POWDER, FOR SOLUTION INTRAVENOUS at 14:19

## 2018-09-19 RX ADMIN — Medication 650 MILLIGRAM(S): at 20:12

## 2018-09-19 RX ADMIN — Medication 125 MILLIGRAM(S): at 06:36

## 2018-09-19 RX ADMIN — SODIUM CHLORIDE 3 MILLILITER(S): 9 INJECTION INTRAMUSCULAR; INTRAVENOUS; SUBCUTANEOUS at 14:17

## 2018-09-19 RX ADMIN — Medication 125 MILLIGRAM(S): at 14:20

## 2018-09-19 RX ADMIN — Medication 1 TABLET(S): at 14:19

## 2018-09-19 RX ADMIN — Medication 2 MILLIGRAM(S): at 08:08

## 2018-09-19 RX ADMIN — Medication 125 MILLIGRAM(S): at 22:09

## 2018-09-19 RX ADMIN — MAGNESIUM OXIDE 400 MG ORAL TABLET 400 MILLIGRAM(S): 241.3 TABLET ORAL at 17:04

## 2018-09-19 RX ADMIN — Medication 650 MILLIGRAM(S): at 20:42

## 2018-09-19 RX ADMIN — FAMOTIDINE 20 MILLIGRAM(S): 10 INJECTION INTRAVENOUS at 14:19

## 2018-09-19 RX ADMIN — MAGNESIUM OXIDE 400 MG ORAL TABLET 400 MILLIGRAM(S): 241.3 TABLET ORAL at 08:09

## 2018-09-19 RX ADMIN — SODIUM CHLORIDE 3 MILLILITER(S): 9 INJECTION INTRAMUSCULAR; INTRAVENOUS; SUBCUTANEOUS at 20:13

## 2018-09-19 RX ADMIN — SODIUM CHLORIDE 3 MILLILITER(S): 9 INJECTION INTRAMUSCULAR; INTRAVENOUS; SUBCUTANEOUS at 06:33

## 2018-09-19 RX ADMIN — IMIPENEM AND CILASTATIN 100 MILLIGRAM(S): 250; 250 INJECTION, POWDER, FOR SOLUTION INTRAVENOUS at 22:10

## 2018-09-19 RX ADMIN — MAGNESIUM OXIDE 400 MG ORAL TABLET 400 MILLIGRAM(S): 241.3 TABLET ORAL at 14:19

## 2018-09-19 RX ADMIN — Medication 20 MILLIGRAM(S): at 22:30

## 2018-09-19 RX ADMIN — TACROLIMUS 1 MILLIGRAM(S): 5 CAPSULE ORAL at 08:07

## 2018-09-19 RX ADMIN — Medication 650 MILLIGRAM(S): at 20:11

## 2018-09-19 RX ADMIN — Medication 650 MILLIGRAM(S): at 08:08

## 2018-09-19 RX ADMIN — TACROLIMUS 1 MILLIGRAM(S): 5 CAPSULE ORAL at 22:09

## 2018-09-19 RX ADMIN — Medication 81 MILLIGRAM(S): at 14:19

## 2018-09-19 RX ADMIN — IMIPENEM AND CILASTATIN 100 MILLIGRAM(S): 250; 250 INJECTION, POWDER, FOR SOLUTION INTRAVENOUS at 06:36

## 2018-09-19 NOTE — PROGRESS NOTE ADULT - SUBJECTIVE AND OBJECTIVE BOX
Pt had one BM this am - consistency unchanged. States he ambulated in hallway and up a flight of stairs yesterday.     MEDICATIONS:  aspirin enteric coated 81 milliGRAM(s) Oral daily    imipenem/cilastatin  IVPB 500 milliGRAM(s) IV Intermittent every 8 hours  trimethoprim   80 mG/sulfamethoxazole 400 mG 1 Tablet(s) Oral daily  vancomycin    Solution 125 milliGRAM(s) Oral every 8 hours    acetaminophen   Tablet .. 650 milliGRAM(s) Oral every 6 hours PRN  acetaminophen   Tablet .. 650 milliGRAM(s) Oral every 6 hours PRN    famotidine    Tablet 20 milliGRAM(s) Oral daily    dextrose 40% Gel 15 Gram(s) Oral once PRN  dextrose 50% Injectable 12.5 Gram(s) IV Push once  dextrose 50% Injectable 25 Gram(s) IV Push once  dextrose 50% Injectable 25 Gram(s) IV Push once  glucagon  Injectable 1 milliGRAM(s) IntraMuscular once PRN  insulin lispro (HumaLOG) corrective regimen sliding scale   SubCutaneous three times a day before meals  insulin lispro (HumaLOG) corrective regimen sliding scale   SubCutaneous at bedtime  pravastatin 20 milliGRAM(s) Oral at bedtime  predniSONE   Tablet 2 milliGRAM(s) Oral <User Schedule>    dextrose 5%. 1000 milliLiter(s) IV Continuous <Continuous>  magnesium oxide 400 milliGRAM(s) Oral three times a day with meals  multivitamin 1 Tablet(s) Oral daily  sodium bicarbonate 650 milliGRAM(s) Oral <User Schedule>  sodium chloride 0.9% lock flush 3 milliLiter(s) IV Push every 8 hours  tacrolimus 1 milliGRAM(s) Oral <User Schedule>      PHYSICAL EXAM:  T(C): 36.7 (09-19-18 @ 04:56), Max: 36.7 (09-19-18 @ 04:56)  HR: 111 (09-19-18 @ 04:56) (107 - 114)  BP: 111/82 (09-19-18 @ 04:56) (105/73 - 111/82)  RR: 18 (09-19-18 @ 04:56) (18 - 18)  SpO2: 95% (09-19-18 @ 04:56) (95% - 100%)  Wt(kg): --  I&O's Summary    18 Sep 2018 07:01  -  19 Sep 2018 07:00  --------------------------------------------------------  IN: 540 mL / OUT: 700 mL / NET: -160 mL    19 Sep 2018 07:01  -  19 Sep 2018 10:34  --------------------------------------------------------  IN: 60 mL / OUT: 100 mL / NET: -40 mL        Appearance: Normal, NAD, laying in bed   HEENT:   Normal oral mucosa, PERRL, EOMI	  Lymphatic: No lymphadenopathy  Cardiovascular: Normal S1 S2, No JVD, III/VI diastolic murmur, No edema  Respiratory: Lungs clear to auscultation	  Psychiatry: A & O x 3, Mood & affect appropriate  Gastrointestinal:  Soft, Non-tender, + BS	  Skin: No rashes, No ecchymoses, No cyanosis	  Neurologic: Non-focal  Extremities: Normal range of motion, No clubbing, cyanosis or edema  Vascular: Peripheral pulses palpable bilaterally        LABS:	 	    CBC Full  -  ( 19 Sep 2018 08:17 )  WBC Count : 3.5 K/uL  Hemoglobin : 8.6 g/dL  Hematocrit : 24.7 %  Platelet Count - Automated : 616 K/uL  Mean Cell Volume : 88.2 fl  Mean Cell Hemoglobin : 30.7 pg  Mean Cell Hemoglobin Concentration : 34.8 gm/dL  Auto Neutrophil # : x  Auto Lymphocyte # : x  Auto Monocyte # : x  Auto Eosinophil # : x  Auto Basophil # : x  Auto Neutrophil % : x  Auto Lymphocyte % : x  Auto Monocyte % : x  Auto Eosinophil % : x  Auto Basophil % : x    09-19    136  |  100  |  16  ----------------------------<  121<H>  4.7   |  26  |  1.26  09-18    137  |  101  |  19  ----------------------------<  135<H>  4.3   |  26  |  1.10    Ca    9.7      19 Sep 2018 08:17  Ca    9.7      18 Sep 2018 09:30 Pt had one BM this am - consistency unchanged. States he ambulated in hallway and up a flight of stairs yesterday.     MEDICATIONS:  aspirin enteric coated 81 milliGRAM(s) Oral daily    imipenem/cilastatin  IVPB 500 milliGRAM(s) IV Intermittent every 8 hours  trimethoprim   80 mG/sulfamethoxazole 400 mG 1 Tablet(s) Oral daily  vancomycin    Solution 125 milliGRAM(s) Oral every 8 hours    acetaminophen   Tablet .. 650 milliGRAM(s) Oral every 6 hours PRN  acetaminophen   Tablet .. 650 milliGRAM(s) Oral every 6 hours PRN    famotidine    Tablet 20 milliGRAM(s) Oral daily    dextrose 40% Gel 15 Gram(s) Oral once PRN  dextrose 50% Injectable 12.5 Gram(s) IV Push once  dextrose 50% Injectable 25 Gram(s) IV Push once  dextrose 50% Injectable 25 Gram(s) IV Push once  glucagon  Injectable 1 milliGRAM(s) IntraMuscular once PRN  insulin lispro (HumaLOG) corrective regimen sliding scale   SubCutaneous three times a day before meals  insulin lispro (HumaLOG) corrective regimen sliding scale   SubCutaneous at bedtime  pravastatin 20 milliGRAM(s) Oral at bedtime  predniSONE   Tablet 2 milliGRAM(s) Oral <User Schedule>    dextrose 5%. 1000 milliLiter(s) IV Continuous <Continuous>  magnesium oxide 400 milliGRAM(s) Oral three times a day with meals  multivitamin 1 Tablet(s) Oral daily  sodium bicarbonate 650 milliGRAM(s) Oral <User Schedule>  sodium chloride 0.9% lock flush 3 milliLiter(s) IV Push every 8 hours  tacrolimus 1 milliGRAM(s) Oral <User Schedule>      PHYSICAL EXAM:  T(C): 36.7 (09-19-18 @ 04:56), Max: 36.7 (09-19-18 @ 04:56)  HR: 111 (09-19-18 @ 04:56) (107 - 114)  BP: 111/82 (09-19-18 @ 04:56) (105/73 - 111/82)  RR: 18 (09-19-18 @ 04:56) (18 - 18)  SpO2: 95% (09-19-18 @ 04:56) (95% - 100%)  I&O's Summary    18 Sep 2018 07:01  -  19 Sep 2018 07:00  --------------------------------------------------------  IN: 540 mL / OUT: 700 mL / NET: -160 mL    19 Sep 2018 07:01  -  19 Sep 2018 10:34  --------------------------------------------------------  IN: 60 mL / OUT: 100 mL / NET: -40 mL      Appearance: Normal, NAD, laying in bed   HEENT:   Normal oral mucosa, PERRL, EOMI	  Lymphatic: No lymphadenopathy  Cardiovascular: Normal S1 S2, No JVD, III/VI diastolic murmur, No edema  Respiratory: Lungs clear to auscultation	  Psychiatry: A & O x 3, Mood & affect appropriate  Gastrointestinal:  Soft, Non-tender, + BS	  Skin: No rashes, No ecchymoses, No cyanosis	  Neurologic: Non-focal  Extremities: Normal range of motion, No clubbing, cyanosis or edema  Vascular: Peripheral pulses palpable bilaterally    LABS:	 	    CBC Full  -  ( 19 Sep 2018 08:17 )  WBC Count : 3.5 K/uL  Hemoglobin : 8.6 g/dL  Hematocrit : 24.7 %  Platelet Count - Automated : 616 K/uL  Mean Cell Volume : 88.2 fl  Mean Cell Hemoglobin : 30.7 pg  Mean Cell Hemoglobin Concentration : 34.8 gm/dL  Auto Neutrophil # : x  Auto Lymphocyte # : x  Auto Monocyte # : x  Auto Eosinophil # : x  Auto Basophil # : x  Auto Neutrophil % : x  Auto Lymphocyte % : x  Auto Monocyte % : x  Auto Eosinophil % : x  Auto Basophil % : x    09-19    136  |  100  |  16  ----------------------------<  121<H>  4.7   |  26  |  1.26  09-18    137  |  101  |  19  ----------------------------<  135<H>  4.3   |  26  |  1.10    Ca    9.7      19 Sep 2018 08:17  Ca    9.7      18 Sep 2018 09:30

## 2018-09-19 NOTE — PROGRESS NOTE ADULT - PROBLEM SELECTOR PLAN 1
d/w  HF  tac level   10.5    Continue prednisone 2mg daily  sodium bicarb BID.  Continue asa and statin.   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

## 2018-09-19 NOTE — PROGRESS NOTE ADULT - ATTENDING COMMENTS
Awaiting approval for antibiotics or dispo plan that allows for them. Will monitor leukopenia. Most recent CMV PCR was on 9/12 and was negative.     Please call me with questions at [phone]. Plan discussed in multidisciplinary rounds with 2 [facility] team and CT surgery.

## 2018-09-19 NOTE — PROGRESS NOTE ADULT - PROBLEM SELECTOR PLAN 5
- Continue aspirin 81 mg daily.   - Continue pravastain 20 mg daily. - Continue aspirin 81 mg daily.   - Continue pravastatin 20 mg daily.

## 2018-09-19 NOTE — PROGRESS NOTE ADULT - SUBJECTIVE AND OBJECTIVE BOX
S:  no complaints    Telemetry:  -120    Vital Signs Last 24 Hrs  T(C): 36.4 (18 @ 13:18), Max: 36.7 (18 @ 04:56)  T(F): 97.5 (18 @ 13:18), Max: 98 (18 @ 04:56)  HR: 114 (18 @ 13:18) (107 - 114)  BP: 120/84 (18 @ 13:18) (107/56 - 120/84)  RR: 18 (18 @ 13:18) (18 - 18)  SpO2: 96% (18 @ 13:18) (95% - 97%)                    @ 07:01  -   @ 07:00  --------------------------------------------------------  IN: 540 mL / OUT: 700 mL / NET: -160 mL     @ 07:01  -   @ 17:15  --------------------------------------------------------  IN: 180 mL / OUT: 425 mL / NET: -245 mL          Daily Weight in k.6 (19 Sep 2018 07:15)        POCT Blood Glucose.: 138 mg/dL (19 Sep 2018 16:37)  POCT Blood Glucose.: 103 mg/dL (19 Sep 2018 11:41)  POCT Blood Glucose.: 95 mg/dL (19 Sep 2018 07:05)  POCT Blood Glucose.: 108 mg/dL (18 Sep 2018 21:27)          Drains:     MS         [  ] Drainage:                 L Pleural  [  ]  Drainage:                R Pleural  [  ]  Drainage:    Pacing Wires        [  ]   Settings:                                  Isolated  [  ]    Coumadin    [ ] YES          [ x ]      NO         Reason:                                 8.6    3.5   )-----------( 616      ( 19 Sep 2018 08:17 )             24.7           136  |  100  |  16  ----------------------------<  121<H>  4.7   |  26  |  1.26    Ca    9.7      19 Sep 2018 08:17            PHYSICAL EXAM:    Neurology: alert and oriented x 3, nonfocal, no gross deficits    S1 S2 heard RRR    Sternum well-healed    Lungs:  clear to ausc  no w/r/r    Abdomen: soft, nontender, nondistended, positive bowel sounds,     Extremities:    no edema           acetaminophen   Tablet .. 650 milliGRAM(s) Oral every 6 hours PRN  acetaminophen   Tablet .. 650 milliGRAM(s) Oral every 6 hours PRN  aspirin enteric coated 81 milliGRAM(s) Oral daily  Calcium citrate + vit d3 315mg/250 IU 2 Tablet(s) 2 Tablet(s) Oral two times a day  dextrose 40% Gel 15 Gram(s) Oral once PRN  dextrose 5%. 1000 milliLiter(s) IV Continuous <Continuous>  dextrose 50% Injectable 12.5 Gram(s) IV Push once  dextrose 50% Injectable 25 Gram(s) IV Push once  dextrose 50% Injectable 25 Gram(s) IV Push once  famotidine    Tablet 20 milliGRAM(s) Oral daily  glucagon  Injectable 1 milliGRAM(s) IntraMuscular once PRN  imipenem/cilastatin  IVPB 500 milliGRAM(s) IV Intermittent every 8 hours  insulin lispro (HumaLOG) corrective regimen sliding scale   SubCutaneous three times a day before meals  insulin lispro (HumaLOG) corrective regimen sliding scale   SubCutaneous at bedtime  magnesium oxide 400 milliGRAM(s) Oral three times a day with meals  multivitamin 1 Tablet(s) Oral daily  pravastatin 20 milliGRAM(s) Oral at bedtime  predniSONE   Tablet 2 milliGRAM(s) Oral <User Schedule>  sodium bicarbonate 650 milliGRAM(s) Oral <User Schedule>  sodium chloride 0.9% lock flush 3 milliLiter(s) IV Push every 8 hours  tacrolimus 1 milliGRAM(s) Oral <User Schedule>  trimethoprim   80 mG/sulfamethoxazole 400 mG 1 Tablet(s) Oral daily  vancomycin    Solution 125 milliGRAM(s) Oral every 8 hours                              Physical Therapy Rec:   Home  [  ]   Home w/ PT  [  ]  Rehab  [ x ]  home vs rehab    Discussed with Cardiothoracic Team at AM rounds.

## 2018-09-19 NOTE — PROGRESS NOTE ADULT - ASSESSMENT
67 y/o male with PMH NICM HFrEF s/p HM2 LVAD 6/2017, who underwent heart transplant on 2/23/18 (CMV D-/R+ and Toxo D-/R+, Hep C+ heart) with post op graft dysfunction who underwent plasmapheresis and IVIG x2 as well as rituximab, with suspected fungal PNA diagnosed 6/2018  treated w voriconazole, recurrent PNA with new RUL infiltrate on CT scan 8/18, which  improved on broad spectrum antibiotics. S/p lung biopsy 8/18. C diff toxin positive -still  on oral vanco,.  Patient presents today for RHC and cardiac biopsy with c/o ongoing diarrhea (3-4 loose BM daily) and mildly productive cough, denies fevers, chills, abd pain, N/V. EKG demonstrated ST at 133bpm. Pt readmitted for further workup.  9/6 one formed BM this am; CMV study's pending, BC pending afebrile, albumin 500 cc for dehydration  potassium supplemented, transition to regular diet.   9/7 VVS; underwent IR procedure of Right lung mass FNA.  Tolerated procedure well.  Culture pending. Vanco IV D/C per ID. Continue with current medication regimen. Send Legionella urine culture and check voriconazole (T) in AM   Disposition: Home once medically cleared   9/8 + fever x1 101.7 as per ID cefepine decreased to 2g bid ivpb  9/9 VSS. 1 liquid/soft BM. Heme consulted for neutropenia possible bone marrow Bx. Deferring Bx at this time until anemia work up complete. Tacro 11.8. 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  Trying to work out antibiotic coverage with insurance

## 2018-09-19 NOTE — PROGRESS NOTE ADULT - PROBLEM SELECTOR PLAN 2
- Tacrolimus level today 10.5. Goal 8 to 10.   - will cont tacro 1mg bid   - Continue prednisone 2 mg daily.    - Continue off of Cellcept.

## 2018-09-19 NOTE — PROGRESS NOTE ADULT - ASSESSMENT
67 yo M s/p heart transplant complicated by rejection with rounds of plasmaphoresis , IVIG, rituximab x2, known colonizer with  Pseudomonas was treated for PNA in June 2018  and again in August 14-30 with cefepime and voriconazole as well as c.diff with PO vanco presented this admission with decreased PO intake, 4 watery BM daily and a productive cough. Pt had CT guided R lung mass bx on 8/17 which showed acute inflammation but was negative bacterial or fungal etiology. Concerned now for cdiff colitis vs CMV colitis.     last admission labs:   First CT guided biopsy cultures are NGTD- only grew a corynebacterium that is likely not a pathogen  Spoke with the micro. 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  He is responding again to C.diff treatment; switched from cefepime to imipenem for concern of nocardia    Suggest:  -Continue IV Imipenem pending sensitivities of nocarida species  -insurance currently denying home infusion and may need SNF placement  - would repeat chest CT imaging after about 2weeks to check for improvement in infiltrates  - continue Bactrim for both the nocardia and for OI prophylaxis  - check CMV viral load weekly while off ganciclovir  - continue Vanco taper for pst C.diff    Gary Lujan MD  [phone]  After 5pm/weekends [phone]

## 2018-09-19 NOTE — PROGRESS NOTE ADULT - PROBLEM SELECTOR PLAN 2
s/p 9/7 Right lung FNA   ID following    -Continue IV Imipenem pending sensitivities of nocarida species  -insurance currently denying home infusion and may need SNF placement  - would repeat chest CT imaging after about 2weeks to check for improvement in infiltrates  - continue Bactrim for both the nocardia and for OI prophylaxis  - check CMV viral load weekly while off ganciclovir  - continue Vanco taper for pst C.diff

## 2018-09-19 NOTE — PROGRESS NOTE ADULT - PROBLEM SELECTOR PLAN 4
- Will slowly wean off of oral vancomycin per ID recommendations. Total of 10 week course with taper.

## 2018-09-19 NOTE — PROGRESS NOTE ADULT - SUBJECTIVE AND OBJECTIVE BOX
INFECTIOUS DISEASES FOLLOW UP-- Sujey Lujan  [phone]    This is a follow up note for this  68yMale with  History of heart transplant      ROS:  CONSTITUTIONAL:  No fever, good appetite  CARDIOVASCULAR:  No chest pain or palpitations  RESPIRATORY:  No dyspnea  GASTROINTESTINAL:  No nausea, vomiting, diarrhea, or abdominal pain  GENITOURINARY:  No dysuria  NEUROLOGIC:  No headache,     Allergies    No Known Allergies    Intolerances        ANTIBIOTICS/RELEVANT:  antimicrobials  imipenem/cilastatin  IVPB 500 milliGRAM(s) IV Intermittent every 8 hours  trimethoprim   80 mG/sulfamethoxazole 400 mG 1 Tablet(s) Oral daily  vancomycin    Solution 125 milliGRAM(s) Oral every 8 hours    immunologic:  tacrolimus 1 milliGRAM(s) Oral <User Schedule>    OTHER:  acetaminophen   Tablet .. 650 milliGRAM(s) Oral every 6 hours PRN  acetaminophen   Tablet .. 650 milliGRAM(s) Oral every 6 hours PRN  aspirin enteric coated 81 milliGRAM(s) Oral daily  Calcium citrate + vit d3 315mg/250 IU 2 Tablet(s) 2 Tablet(s) Oral two times a day  dextrose 40% Gel 15 Gram(s) Oral once PRN  dextrose 5%. 1000 milliLiter(s) IV Continuous <Continuous>  dextrose 50% Injectable 12.5 Gram(s) IV Push once  dextrose 50% Injectable 25 Gram(s) IV Push once  dextrose 50% Injectable 25 Gram(s) IV Push once  famotidine    Tablet 20 milliGRAM(s) Oral daily  glucagon  Injectable 1 milliGRAM(s) IntraMuscular once PRN  insulin lispro (HumaLOG) corrective regimen sliding scale   SubCutaneous three times a day before meals  insulin lispro (HumaLOG) corrective regimen sliding scale   SubCutaneous at bedtime  magnesium oxide 400 milliGRAM(s) Oral three times a day with meals  multivitamin 1 Tablet(s) Oral daily  pravastatin 20 milliGRAM(s) Oral at bedtime  predniSONE   Tablet 2 milliGRAM(s) Oral <User Schedule>  sodium bicarbonate 650 milliGRAM(s) Oral <User Schedule>  sodium chloride 0.9% lock flush 3 milliLiter(s) IV Push every 8 hours      Objective:  Vital Signs Last 24 Hrs  T(C): 36.4 (19 Sep 2018 13:18), Max: 36.7 (19 Sep 2018 04:56)  T(F): 97.5 (19 Sep 2018 13:18), Max: 98 (19 Sep 2018 04:56)  HR: 114 (19 Sep 2018 13:18) (107 - 114)  BP: 120/84 (19 Sep 2018 13:18) (107/56 - 120/84)  BP(mean): --  RR: 18 (19 Sep 2018 13:18) (18 - 18)  SpO2: 96% (19 Sep 2018 13:18) (95% - 97%)    PHYSICAL EXAM:  Constitutional:no acute distress  Eyes:WALT, EOMI  Ear/Nose/Throat: no oral lesions, 	  Respiratory: clear BL  Cardiovascular: S1S2  Gastrointestinal:soft, (+) BS, no tenderness  Extremities:no e/e/c  No Lymphadenopathy  IV sites not inflammed.    LABS:                        8.6    3.5   )-----------( 616      ( 19 Sep 2018 08:17 )             24.7     09-19    136  |  100  |  16  ----------------------------<  121<H>  4.7   |  26  |  1.26    Ca    9.7      19 Sep 2018 08:17            MICROBIOLOGY:    Cytomegalovirus By PCR:   NotDetec (09.16.18 @ 10:37)    Culture - Nocardia (09.07.18 @ 13:51)    Specimen Source: .[facility]    Culture Results:   Moderate Rule Out Nocardia species  Sent to  [facility]   for Identification  Susceptibility to follow.            RECENT CULTURES:      RADIOLOGY & ADDITIONAL STUDIES:    < from: Xray Chest 1 View- PORTABLE-Routine (09.17.18 @ 05:41) >  IMPRESSION:     Peripheral right lower lobe opacity, unchanged. Please correlate to FNA   dated 9/7/2018.  Right basilar atelectasis.    < end of copied text >

## 2018-09-19 NOTE — PROGRESS NOTE ADULT - PROBLEM SELECTOR PLAN 1
- Continue Imipenem per ID recommendations. Plan will be for total of six months of therapy, which will transition to Ertapenem at discharge - awaiting approval.

## 2018-09-19 NOTE — PROGRESS NOTE ADULT - ASSESSMENT
Mr. 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 He also has had persistent C. diff diarrhea and continues on therapy with improvement. He has no evidence of graft dysfunction on exam and he is euvolemic. Plan for discharge - awaiting approval of IV abx.

## 2018-09-20 DIAGNOSIS — D72.819 DECREASED WHITE BLOOD CELL COUNT, UNSPECIFIED: ICD-10-CM

## 2018-09-20 LAB
ANION GAP SERPL CALC-SCNC: 10 MMOL/L — SIGNIFICANT CHANGE UP (ref 5–17)
BUN SERPL-MCNC: 16 MG/DL — SIGNIFICANT CHANGE UP (ref 7–23)
CALCIUM SERPL-MCNC: 9.3 MG/DL — SIGNIFICANT CHANGE UP (ref 8.4–10.5)
CHLORIDE SERPL-SCNC: 102 MMOL/L — SIGNIFICANT CHANGE UP (ref 96–108)
CO2 SERPL-SCNC: 23 MMOL/L — SIGNIFICANT CHANGE UP (ref 22–31)
CREAT SERPL-MCNC: 1.25 MG/DL — SIGNIFICANT CHANGE UP (ref 0.5–1.3)
GLUCOSE BLDC GLUCOMTR-MCNC: 105 MG/DL — HIGH (ref 70–99)
GLUCOSE BLDC GLUCOMTR-MCNC: 115 MG/DL — HIGH (ref 70–99)
GLUCOSE BLDC GLUCOMTR-MCNC: 96 MG/DL — SIGNIFICANT CHANGE UP (ref 70–99)
GLUCOSE BLDC GLUCOMTR-MCNC: 99 MG/DL — SIGNIFICANT CHANGE UP (ref 70–99)
GLUCOSE SERPL-MCNC: 99 MG/DL — SIGNIFICANT CHANGE UP (ref 70–99)
HCT VFR BLD CALC: 26.6 % — LOW (ref 39–50)
HGB BLD-MCNC: 7.9 G/DL — LOW (ref 13–17)
MCHC RBC-ENTMCNC: 26.2 PG — LOW (ref 27–34)
MCHC RBC-ENTMCNC: 29.8 GM/DL — LOW (ref 32–36)
MCV RBC AUTO: 87.9 FL — SIGNIFICANT CHANGE UP (ref 80–100)
PLATELET # BLD AUTO: 595 K/UL — HIGH (ref 150–400)
POTASSIUM SERPL-MCNC: 5 MMOL/L — SIGNIFICANT CHANGE UP (ref 3.5–5.3)
POTASSIUM SERPL-SCNC: 5 MMOL/L — SIGNIFICANT CHANGE UP (ref 3.5–5.3)
RBC # BLD: 3.02 M/UL — LOW (ref 4.2–5.8)
RBC # FLD: 20.1 % — HIGH (ref 10.3–14.5)
SODIUM SERPL-SCNC: 135 MMOL/L — SIGNIFICANT CHANGE UP (ref 135–145)
TACROLIMUS SERPL-MCNC: 5.3 NG/ML — SIGNIFICANT CHANGE UP
WBC # BLD: 3.9 K/UL — SIGNIFICANT CHANGE UP (ref 3.8–10.5)
WBC # FLD AUTO: 3.9 K/UL — SIGNIFICANT CHANGE UP (ref 3.8–10.5)

## 2018-09-20 PROCEDURE — 99233 SBSQ HOSP IP/OBS HIGH 50: CPT | Mod: GC

## 2018-09-20 PROCEDURE — 99232 SBSQ HOSP IP/OBS MODERATE 35: CPT | Mod: GC

## 2018-09-20 RX ORDER — TACROLIMUS 5 MG/1
2 CAPSULE ORAL
Qty: 0 | Refills: 0 | Status: DISCONTINUED | OUTPATIENT
Start: 2018-09-20 | End: 2018-09-20

## 2018-09-20 RX ORDER — TACROLIMUS 5 MG/1
2 CAPSULE ORAL
Qty: 0 | Refills: 0 | Status: DISCONTINUED | OUTPATIENT
Start: 2018-09-20 | End: 2018-09-21

## 2018-09-20 RX ORDER — TACROLIMUS 5 MG/1
1 CAPSULE ORAL
Qty: 0 | Refills: 0 | Status: DISCONTINUED | OUTPATIENT
Start: 2018-09-20 | End: 2018-09-21

## 2018-09-20 RX ORDER — VANCOMYCIN HCL 1 G
125 VIAL (EA) INTRAVENOUS EVERY 12 HOURS
Qty: 0 | Refills: 0 | Status: DISCONTINUED | OUTPATIENT
Start: 2018-09-20 | End: 2018-09-26

## 2018-09-20 RX ADMIN — SODIUM CHLORIDE 3 MILLILITER(S): 9 INJECTION INTRAMUSCULAR; INTRAVENOUS; SUBCUTANEOUS at 20:48

## 2018-09-20 RX ADMIN — Medication 1 TABLET(S): at 12:35

## 2018-09-20 RX ADMIN — Medication 125 MILLIGRAM(S): at 05:42

## 2018-09-20 RX ADMIN — TACROLIMUS 1 MILLIGRAM(S): 5 CAPSULE ORAL at 09:22

## 2018-09-20 RX ADMIN — MAGNESIUM OXIDE 400 MG ORAL TABLET 400 MILLIGRAM(S): 241.3 TABLET ORAL at 09:22

## 2018-09-20 RX ADMIN — TACROLIMUS 2 MILLIGRAM(S): 5 CAPSULE ORAL at 20:16

## 2018-09-20 RX ADMIN — Medication 20 MILLIGRAM(S): at 21:09

## 2018-09-20 RX ADMIN — Medication 125 MILLIGRAM(S): at 18:10

## 2018-09-20 RX ADMIN — IMIPENEM AND CILASTATIN 100 MILLIGRAM(S): 250; 250 INJECTION, POWDER, FOR SOLUTION INTRAVENOUS at 13:51

## 2018-09-20 RX ADMIN — IMIPENEM AND CILASTATIN 100 MILLIGRAM(S): 250; 250 INJECTION, POWDER, FOR SOLUTION INTRAVENOUS at 21:10

## 2018-09-20 RX ADMIN — SODIUM CHLORIDE 3 MILLILITER(S): 9 INJECTION INTRAMUSCULAR; INTRAVENOUS; SUBCUTANEOUS at 03:53

## 2018-09-20 RX ADMIN — Medication 650 MILLIGRAM(S): at 20:17

## 2018-09-20 RX ADMIN — MAGNESIUM OXIDE 400 MG ORAL TABLET 400 MILLIGRAM(S): 241.3 TABLET ORAL at 12:35

## 2018-09-20 RX ADMIN — Medication 81 MILLIGRAM(S): at 12:35

## 2018-09-20 RX ADMIN — MAGNESIUM OXIDE 400 MG ORAL TABLET 400 MILLIGRAM(S): 241.3 TABLET ORAL at 18:10

## 2018-09-20 RX ADMIN — SODIUM CHLORIDE 3 MILLILITER(S): 9 INJECTION INTRAMUSCULAR; INTRAVENOUS; SUBCUTANEOUS at 13:51

## 2018-09-20 RX ADMIN — IMIPENEM AND CILASTATIN 100 MILLIGRAM(S): 250; 250 INJECTION, POWDER, FOR SOLUTION INTRAVENOUS at 05:43

## 2018-09-20 RX ADMIN — FAMOTIDINE 20 MILLIGRAM(S): 10 INJECTION INTRAVENOUS at 12:35

## 2018-09-20 RX ADMIN — Medication 2 MILLIGRAM(S): at 09:22

## 2018-09-20 RX ADMIN — Medication 650 MILLIGRAM(S): at 09:22

## 2018-09-20 NOTE — PROVIDER CONTACT NOTE (CRITICAL VALUE NOTIFICATION) - BACKGROUND
9/5 Readmit with Temp/ weakness. 9/11 S/P Lung biopsy  + lung infection  2/18 S.P Heart Transplant
heart transplnt

## 2018-09-20 NOTE — PROGRESS NOTE ADULT - ASSESSMENT
Mr. 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 He also has had persistent C. diff diarrhea and continues on therapy with improvement. He has no evidence of graft dysfunction on exam and he is euvolemic. Plan for discharge on IV ertapenem.     ertapenem approved by insurance - approval # [id number]

## 2018-09-20 NOTE — PROGRESS NOTE ADULT - ASSESSMENT
69 y/o male with PMH NICM HFrEF s/p HM2 LVAD 6/2017, who underwent heart transplant on 2/23/18 (CMV D-/R+ and Toxo D-/R+, Hep C+ heart) with post op graft dysfunction who underwent plasmapheresis and IVIG x2 as well as rituximab, with suspected fungal PNA diagnosed 6/2018  treated w voriconazole, recurrent PNA with new RUL infiltrate on CT scan 8/18, which  improved on broad spectrum antibiotics. S/p lung biopsy 8/18. C diff toxin positive -still  on oral vanco,.  Patient presents today for RHC and cardiac biopsy with c/o ongoing diarrhea (3-4 loose BM daily) and mildly productive cough, denies fevers, chills, abd pain, N/V. EKG demonstrated ST at 133bpm. Pt readmitted for further workup.  9/6 one formed BM this am; CMV study's pending, BC pending afebrile, albumin 500 cc for dehydration  potassium supplemented, transition to regular diet.   9/7 VVS; underwent IR procedure of Right lung mass FNA.  Tolerated procedure well.  Culture pending. Vanco IV D/C per ID. Continue with current medication regimen. Send Legionella urine culture and check voriconazole (T) in AM   Disposition: Home once medically cleared   9/8 + fever x1 101.7 as per ID cefepine decreased to 2g bid ivpb  9/9 VSS. 1 liquid/soft BM. Heme consulted for neutropenia possible bone marrow Bx. Deferring Bx at this time until anemia work up complete. Tacro 11.8. 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  Trying to work out antibiotic coverage with insurance  9/20  Hd stable.  Tacro 5.3.  Per HF increase to 2mg in pm.  Insurance approved antbx.  CRUZ sent to facilities.  Awaiting acceptance.

## 2018-09-20 NOTE — PROGRESS NOTE ADULT - ATTENDING COMMENTS
Will arrange for discharge. If he is discharged today, he will have repeat tacro level on Monday.     Please call me with questions at [phone].

## 2018-09-20 NOTE — PROGRESS NOTE ADULT - PROBLEM SELECTOR PLAN 2
s/p 9/7 Right lung FNA   ID following    -Continue IV Imipenem pending sensitivities of nocarida species  -insurance approved infusion and may need SNF placement  - would repeat chest CT imaging after about 2weeks to check for improvement in infiltrates  - continue Bactrim for both the nocardia and for OI prophylaxis  - check CMV viral load weekly while off ganciclovir  - continue Vanco taper for pst C.diff--decrease to 2x a day x 1 week

## 2018-09-20 NOTE — PROGRESS NOTE ADULT - PROBLEM SELECTOR PLAN 1
- Continue Imipenem while inpatient per ID recommendations. Plan will be for total of six months of therapy, which will transition to Ertapenem at discharge

## 2018-09-20 NOTE — PROVIDER CONTACT NOTE (CRITICAL VALUE NOTIFICATION) - SITUATION
As per lab  Tech. Tacro.  level this AM 5.3
pt h/o heart transplant on tacrolimus po level noted 29.5

## 2018-09-20 NOTE — PROGRESS NOTE ADULT - SUBJECTIVE AND OBJECTIVE BOX
Follow Up:  This is a follow up note for this  68yMale with History of heart transplant    Interval History/ROS: Pt laying in bed. States he ambulated in hallway yesterday. Continues to have one bowel movement per day that is unchanged in consistency.       CONSTITUTIONAL:  No fever, good appetite  CARDIOVASCULAR:  No chest pain or palpitations  RESPIRATORY:  No dyspnea  GASTROINTESTINAL:  No nausea, vomiting, diarrhea, or abdominal pain  GENITOURINARY:  No dysuria  NEUROLOGIC:  No headache,     Allergies  No Known Allergies        ANTIMICROBIALS:  imipenem/cilastatin  IVPB 500 every 8 hours  trimethoprim   80 mG/sulfamethoxazole 400 mG 1 daily  vancomycin    Solution 125 every 8 hours      OTHER MEDS:  MEDICATIONS  (STANDING):  acetaminophen   Tablet .. 650 every 6 hours PRN  acetaminophen   Tablet .. 650 every 6 hours PRN  aspirin enteric coated 81 daily  dextrose 40% Gel 15 once PRN  dextrose 50% Injectable 12.5 once  dextrose 50% Injectable 25 once  dextrose 50% Injectable 25 once  famotidine    Tablet 20 daily  glucagon  Injectable 1 once PRN  insulin lispro (HumaLOG) corrective regimen sliding scale  three times a day before meals  insulin lispro (HumaLOG) corrective regimen sliding scale  at bedtime  pravastatin 20 at bedtime  predniSONE   Tablet 2 <User Schedule>  tacrolimus 1 <User Schedule>      Vital Signs Last 24 Hrs  T(C): 36.7 (20 Sep 2018 04:49), Max: 36.8 (19 Sep 2018 20:00)  T(F): 98 (20 Sep 2018 04:49), Max: 98.2 (19 Sep 2018 20:00)  HR: 111 (20 Sep 2018 04:49) (103 - 114)  BP: 123/91 (20 Sep 2018 04:49) (117/84 - 123/91)  BP(mean): --  RR: 18 (20 Sep 2018 04:49) (18 - 18)  SpO2: 97% (20 Sep 2018 04:49) (95% - 97%)    PHYSICAL EXAM:  Constitutional:no acute distress  Eyes:WALT, EOMI  Ear/Nose/Throat: no oral lesions, 	  Respiratory: clear BL  Cardiovascular: S1S2  Gastrointestinal:soft, (+) BS, no tenderness  Extremities:no e/e/c  No Lymphadenopathy  IV sites not inflammed.                              7.9    3.9   )-----------( 595      ( 20 Sep 2018 09:17 )             26.6       09-20    135  |  102  |  16  ----------------------------<  99  5.0   |  23  |  1.25    Ca    9.3      20 Sep 2018 09:17        MICROBIOLOGY:  trisha Meyer  09-07-18   Moderate Rule Out Nocardia species  Sent to  [facility]   for Identification  Susceptibility to follow.  --  --      .Body Fluid Pleural Fluid  09-07-18   Moderate Rule Out Nocardia species  --    Upon re-evaluation of gram stain:  polymorphonuclear leukocytes seen per low power field  Gram Positive Rods  by cytocentrifuge  Previously reported as:  Moderate polymorphonuclear leukocytes per low power field  No organisms seen per oil power field      .Blood Blood  09-07-18   No growth at 5 days.  --  --      .Blood Blood-Peripheral  09-05-18   No growth at 5 days.  --  --      .Blood Blood-Venous  09-05-18   No growth at 5 days.  --  --        Toxoplasma IgG Screen: 4.7 IU/mL (06-14-18 @ 09:42)  HIV-1 RNA Quantitative, Viral Load Log: NOT DET. lg /mL (05-31-18 @ 19:10)  CMV IgG Antibody: 5.40 U/mL (05-25-18 @ 17:51)  CMV IgG Antibody: >10.00 U/mL (02-22-18 @ 23:45)  HIV-1 RNA Quantitative, Viral Load Log: NOT DET. lg /mL (02-02-18 @ 19:25)    CMVPCR Log: NotDetec LogIU/mL (09-14 @ 09:37)        RADIOLOGY:

## 2018-09-20 NOTE — PROGRESS NOTE ADULT - PROBLEM SELECTOR PLAN 1
d/w  HF  tac level  5.3.  Dose 2mg pm  1mg am  Continue prednisone 2mg daily  sodium bicarb BID.  Continue asa and statin.   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

## 2018-09-20 NOTE — CHART NOTE - NSCHARTNOTEFT_GEN_A_CORE
Source: Patient [X ]    Family [ ]     other [X ] RN, medical record,     Pt seen for nutritional follow up. Pt seen, reports feeling okay but is requesting ice cream. RD reviewed ice cream and milk shake program with NP, who agrees ice cream x1 daily is okay, and agrees that milk shake program is okay. Pt aware this will depend on BG levels. Pt denies to review diet education at this time    Chart reviewed, events noted. Pt admitted for fever and diarrhea. Per chart, Pt with history of heart transplant and recurrent C-Diff. Per chart. s/p Lung Bx and per ID ? if diarrhea is related to immunosuppression induced.  Pt with Pulmonary nocardiosis. C-Diff+.     Diet : Consistent CHO       Patient reports no GI distress, last BM was today.      PO intake:  80%     Source for PO intake [ ] Patient [ ] family [ X] chart [ ] staff [ ] othe      Current Weight: 69.1kg, stable   Admission Wt: 70.6 (09-16 @ 08:00)  % Weight Change    Pertinent Medications: MEDICATIONS  (STANDING):  aspirin enteric coated 81 milliGRAM(s) Oral daily  Calcium citrate + vit d3 315mg/250 IU 2 Tablet(s) 2 Tablet(s) Oral two times a day  dextrose 5%. 1000 milliLiter(s) (50 mL/Hr) IV Continuous <Continuous>  dextrose 50% Injectable 12.5 Gram(s) IV Push once  dextrose 50% Injectable 25 Gram(s) IV Push once  dextrose 50% Injectable 25 Gram(s) IV Push once  famotidine    Tablet 20 milliGRAM(s) Oral daily  imipenem/cilastatin  IVPB 500 milliGRAM(s) IV Intermittent every 8 hours  insulin lispro (HumaLOG) corrective regimen sliding scale   SubCutaneous three times a day before meals  insulin lispro (HumaLOG) corrective regimen sliding scale   SubCutaneous at bedtime  magnesium oxide 400 milliGRAM(s) Oral three times a day with meals  multivitamin 1 Tablet(s) Oral daily  pravastatin 20 milliGRAM(s) Oral at bedtime  predniSONE   Tablet 2 milliGRAM(s) Oral <User Schedule>  sodium bicarbonate 650 milliGRAM(s) Oral <User Schedule>  sodium chloride 0.9% lock flush 3 milliLiter(s) IV Push every 8 hours  tacrolimus 2 milliGRAM(s) Oral <User Schedule>  trimethoprim   80 mG/sulfamethoxazole 400 mG 1 Tablet(s) Oral daily  vancomycin    Solution 125 milliGRAM(s) Oral every 12 hours    MEDICATIONS  (PRN):  acetaminophen   Tablet .. 650 milliGRAM(s) Oral every 6 hours PRN Temp greater or equal to 38C (100.4F)  acetaminophen   Tablet .. 650 milliGRAM(s) Oral every 6 hours PRN Mild Pain (1 - 3)  dextrose 40% Gel 15 Gram(s) Oral once PRN Blood Glucose LESS THAN 70 milliGRAM(s)/deciliter  glucagon  Injectable 1 milliGRAM(s) IntraMuscular once PRN Glucose LESS THAN 70 milligrams/deciliter    Pertinent Labs:  Hgb/Hct:7.9/26.6-low, Na:135, K:5.0, BUN:16, Cr:1.25, Glucose:99, Ca:9.3, BG levels: 9/19: , 9/20: 105-115    Skin: intact, +2 right arm edema     Estimated Needs:   [ X] no change since previous assessment  [ ] recalculated:       Previous Nutrition Diagnosis:     [X ]Inadequate Oral Intake          Nutrition Diagnosis is [ X] ongoing            New Nutrition Diagnosis: [ X] not applicable     Interventions:     Recommend    1. Provide food preferences as requested by Pt/family within diet restrictions    2. Encourage PO intake during meal times   3. Reviewed menu ordering procedures   4. NP agrees to provider to RN for ice cream x1 daily  5. NP agrees to allow Pt to participate in milk shake program   6. Trend BG levels, renal indices, LFT's and electrolytes          Monitoring and Evaluation:     [X ] PO intake [ X] Tolerance to diet prescription [ X] weights [X ] follow up per protocol    [ X] other: RD remains available Sarah Siegler RD, [facility] Pager #[phone]

## 2018-09-20 NOTE — PROGRESS NOTE ADULT - ATTENDING COMMENTS
Patient seen and examined with DR. Perez  I agree with his interval history exam findings and plans as noted above    Patient feeling better  Tolerating IV Imipenem for nocardia- ID/sen pending  Bactrim for OI prophylaxis  Vanco taper for C.diff    Gary Lujan MD  [phone]  After 5pm/weekends [phone]

## 2018-09-20 NOTE — PROGRESS NOTE ADULT - SUBJECTIVE AND OBJECTIVE BOX
Pt laying in bed. States he ambulated in hallway yesterday. Continues to have one bowel movement per day that is unchanged in consistency.     MEDICATIONS:  aspirin enteric coated 81 milliGRAM(s) Oral daily  imipenem/cilastatin  IVPB 500 milliGRAM(s) IV Intermittent every 8 hours  trimethoprim   80 mG/sulfamethoxazole 400 mG 1 Tablet(s) Oral daily  vancomycin    Solution 125 milliGRAM(s) Oral every 8 hours  acetaminophen   Tablet .. 650 milliGRAM(s) Oral every 6 hours PRN  acetaminophen   Tablet .. 650 milliGRAM(s) Oral every 6 hours PRN  famotidine    Tablet 20 milliGRAM(s) Oral daily  dextrose 40% Gel 15 Gram(s) Oral once PRN  dextrose 50% Injectable 12.5 Gram(s) IV Push once  dextrose 50% Injectable 25 Gram(s) IV Push once  dextrose 50% Injectable 25 Gram(s) IV Push once  glucagon  Injectable 1 milliGRAM(s) IntraMuscular once PRN  insulin lispro (HumaLOG) corrective regimen sliding scale   SubCutaneous three times a day before meals  insulin lispro (HumaLOG) corrective regimen sliding scale   SubCutaneous at bedtime  pravastatin 20 milliGRAM(s) Oral at bedtime  predniSONE   Tablet 2 milliGRAM(s) Oral <User Schedule>  dextrose 5%. 1000 milliLiter(s) IV Continuous <Continuous>  magnesium oxide 400 milliGRAM(s) Oral three times a day with meals  multivitamin 1 Tablet(s) Oral daily  sodium bicarbonate 650 milliGRAM(s) Oral <User Schedule>  sodium chloride 0.9% lock flush 3 milliLiter(s) IV Push every 8 hours  tacrolimus 1 milliGRAM(s) Oral <User Schedule>    PHYSICAL EXAM:  T(C): 36.7 (09-20-18 @ 04:49), Max: 36.8 (09-19-18 @ 20:00)  HR: 111 (09-20-18 @ 04:49) (103 - 114)  BP: 123/91 (09-20-18 @ 04:49) (117/84 - 123/91)  RR: 18 (09-20-18 @ 04:49) (18 - 18)  SpO2: 97% (09-20-18 @ 04:49) (95% - 97%)  Wt(kg): --  I&O's Summary    19 Sep 2018 07:01  -  20 Sep 2018 07:00  --------------------------------------------------------  IN: 420 mL / OUT: 675 mL / NET: -255 mL      Appearance: Normal	  HEENT:   Normal oral mucosa, PERRL, EOMI	  Lymphatic: No lymphadenopathy  Cardiovascular: Normal S1 S2, No JVD,  III/VI diastolic murmur, No edema  Respiratory: Lungs clear to auscultation	  Psychiatry: A & O x 3, Mood & affect appropriate  Gastrointestinal:  Soft, Non-tender, + BS	  Skin: No rashes, No ecchymoses, No cyanosis	  Neurologic: Non-focal  Extremities: Normal range of motion, No clubbing, cyanosis or edema  Vascular: Peripheral pulses palpable  bilaterally        LABS:	 	    CBC Full  -  ( 19 Sep 2018 08:17 )  WBC Count : 3.5 K/uL  Hemoglobin : 8.6 g/dL  Hematocrit : 24.7 %  Platelet Count - Automated : 616 K/uL  Mean Cell Volume : 88.2 fl  Mean Cell Hemoglobin : 30.7 pg  Mean Cell Hemoglobin Concentration : 34.8 gm/dL  Auto Neutrophil # : x  Auto Lymphocyte # : x  Auto Monocyte # : x  Auto Eosinophil # : x  Auto Basophil # : x  Auto Neutrophil % : x  Auto Lymphocyte % : x  Auto Monocyte % : x  Auto Eosinophil % : x  Auto Basophil % : x    09-19    136  |  100  |  16  ----------------------------<  121<H>  4.7   |  26  |  1.26  09-18    137  |  101  |  19  ----------------------------<  135<H>  4.3   |  26  |  1.10    Ca    9.7      19 Sep 2018 08:17  Ca    9.7      18 Sep 2018 09:30 Pt laying in bed. States he ambulated in hallway yesterday. Continues to have one bowel movement per day that is unchanged in consistency.     MEDICATIONS:  aspirin enteric coated 81 milliGRAM(s) Oral daily  imipenem/cilastatin  IVPB 500 milliGRAM(s) IV Intermittent every 8 hours  trimethoprim   80 mG/sulfamethoxazole 400 mG 1 Tablet(s) Oral daily  vancomycin    Solution 125 milliGRAM(s) Oral every 8 hours  acetaminophen   Tablet .. 650 milliGRAM(s) Oral every 6 hours PRN  acetaminophen   Tablet .. 650 milliGRAM(s) Oral every 6 hours PRN  famotidine    Tablet 20 milliGRAM(s) Oral daily  dextrose 40% Gel 15 Gram(s) Oral once PRN  dextrose 50% Injectable 12.5 Gram(s) IV Push once  dextrose 50% Injectable 25 Gram(s) IV Push once  dextrose 50% Injectable 25 Gram(s) IV Push once  glucagon  Injectable 1 milliGRAM(s) IntraMuscular once PRN  insulin lispro (HumaLOG) corrective regimen sliding scale   SubCutaneous three times a day before meals  insulin lispro (HumaLOG) corrective regimen sliding scale   SubCutaneous at bedtime  pravastatin 20 milliGRAM(s) Oral at bedtime  predniSONE   Tablet 2 milliGRAM(s) Oral <User Schedule>  dextrose 5%. 1000 milliLiter(s) IV Continuous <Continuous>  magnesium oxide 400 milliGRAM(s) Oral three times a day with meals  multivitamin 1 Tablet(s) Oral daily  sodium bicarbonate 650 milliGRAM(s) Oral <User Schedule>  sodium chloride 0.9% lock flush 3 milliLiter(s) IV Push every 8 hours  tacrolimus 1 milliGRAM(s) Oral <User Schedule>    PHYSICAL EXAM:  T(C): 36.7 (09-20-18 @ 04:49), Max: 36.8 (09-19-18 @ 20:00)  HR: 111 (09-20-18 @ 04:49) (103 - 114)  BP: 123/91 (09-20-18 @ 04:49) (117/84 - 123/91)  RR: 18 (09-20-18 @ 04:49) (18 - 18)  SpO2: 97% (09-20-18 @ 04:49) (95% - 97%)  Wt(kg): --  I&O's Summary    19 Sep 2018 07:01  -  20 Sep 2018 07:00  --------------------------------------------------------  IN: 420 mL / OUT: 675 mL / NET: -255 mL      Appearance: Normal	  HEENT:   Normal oral mucosa, PERRL, EOMI	  Lymphatic: No lymphadenopathy  Cardiovascular: Normal S1 S2, No JVD,  III/VI diastolic murmur, No edema  Respiratory: Lungs clear to auscultation	  Psychiatry: A & O x 3, Mood & affect appropriate  Gastrointestinal:  Soft, Non-tender, + BS	  Skin: No rashes, No ecchymoses, No cyanosis	  Neurologic: Non-focal  Extremities: Normal range of motion, No clubbing, cyanosis or edema  Vascular: Peripheral pulses palpable  bilaterally        LABS:	 	    CBC Full  -  ( 19 Sep 2018 08:17 )  WBC Count : 3.5 K/uL  Hemoglobin : 8.6 g/dL  Hematocrit : 24.7 %  Platelet Count - Automated : 616 K/uL  Mean Cell Volume : 88.2 fl  Mean Cell Hemoglobin : 30.7 pg  Mean Cell Hemoglobin Concentration : 34.8 gm/dL      09-19    136  |  100  |  16  ----------------------------<  121<H>  4.7   |  26  |  1.26  09-18    137  |  101  |  19  ----------------------------<  135<H>  4.3   |  26  |  1.10    Ca    9.7      19 Sep 2018 08:17  Ca    9.7      18 Sep 2018 09:30

## 2018-09-20 NOTE — PROGRESS NOTE ADULT - SUBJECTIVE AND OBJECTIVE BOX
S:  feels ok.  No complaints    Telemetry:  ST  100-110    Vital Signs Last 24 Hrs  T(C): 37 (18 @ 13:40), Max: 37 (18 @ 13:40)  T(F): 98.6 (18 @ 13:40), Max: 98.6 (18 @ 13:40)  HR: 113 (18 @ 13:40) (103 - 113)  BP: 137/97 (18 @ 13:40) (117/84 - 137/97)  RR: 18 (18 @ 13:40) (18 - 18)  SpO2: 96% (18 @ 13:40) (95% - 97%)                    @ 07:01  -   @ 07:00  --------------------------------------------------------  IN: 420 mL / OUT: 675 mL / NET: -255 mL     @ 07:01  -   @ 14:01  --------------------------------------------------------  IN: 440 mL / OUT: 301 mL / NET: 139 mL    Daily Weight in k.1 (20 Sep 2018 07:26)      POCT Blood Glucose.: 115 mg/dL (20 Sep 2018 11:50)  POCT Blood Glucose.: 105 mg/dL (20 Sep 2018 07:26)  POCT Blood Glucose.: 112 mg/dL (19 Sep 2018 22:13)  POCT Blood Glucose.: 138 mg/dL (19 Sep 2018 16:37)          Drains:     MS         [  ] Drainage:                 L Pleural  [  ]  Drainage:                R Pleural  [  ]  Drainage:    Pacing Wires        [  ]   Settings:                                  Isolated  [  ]    Coumadin    [ ] YES          [  ]      NO         Reason:                                 7.9    3.9   )-----------( 595      ( 20 Sep 2018 09:17 )             26.6           135  |  102  |  16  ----------------------------<  99  5.0   |  23  |  1.25    Ca    9.3      20 Sep 2018 09:17            PHYSICAL EXAM:    Neurology: alert and oriented x 3, nonfocal, no gross deficits    CV :    Sternal Wound :  CDI , Stable    Lungs:    Abdomen: soft, nontender, nondistended, positive bowel sounds, last bowel movement     :               Extremities:               acetaminophen   Tablet .. 650 milliGRAM(s) Oral every 6 hours PRN  acetaminophen   Tablet .. 650 milliGRAM(s) Oral every 6 hours PRN  aspirin enteric coated 81 milliGRAM(s) Oral daily  Calcium citrate + vit d3 315mg/250 IU 2 Tablet(s) 2 Tablet(s) Oral two times a day  dextrose 40% Gel 15 Gram(s) Oral once PRN  dextrose 5%. 1000 milliLiter(s) IV Continuous <Continuous>  dextrose 50% Injectable 12.5 Gram(s) IV Push once  dextrose 50% Injectable 25 Gram(s) IV Push once  dextrose 50% Injectable 25 Gram(s) IV Push once  famotidine    Tablet 20 milliGRAM(s) Oral daily  glucagon  Injectable 1 milliGRAM(s) IntraMuscular once PRN  imipenem/cilastatin  IVPB 500 milliGRAM(s) IV Intermittent every 8 hours  insulin lispro (HumaLOG) corrective regimen sliding scale   SubCutaneous three times a day before meals  insulin lispro (HumaLOG) corrective regimen sliding scale   SubCutaneous at bedtime  magnesium oxide 400 milliGRAM(s) Oral three times a day with meals  multivitamin 1 Tablet(s) Oral daily  pravastatin 20 milliGRAM(s) Oral at bedtime  predniSONE   Tablet 2 milliGRAM(s) Oral <User Schedule>  sodium bicarbonate 650 milliGRAM(s) Oral <User Schedule>  sodium chloride 0.9% lock flush 3 milliLiter(s) IV Push every 8 hours  tacrolimus 2 milliGRAM(s) Oral <User Schedule>  trimethoprim   80 mG/sulfamethoxazole 400 mG 1 Tablet(s) Oral daily  vancomycin    Solution 125 milliGRAM(s) Oral every 12 hours                              Physical Therapy Rec:   Home  [  ]   Home w/ PT  [  ]  Rehab  [  ]    Discussed with Cardiothoracic Team at AM rounds. S:  feels ok.  No complaints    Telemetry:  ST  100-110    Vital Signs Last 24 Hrs  T(C): 37 (18 @ 13:40), Max: 37 (18 @ 13:40)  T(F): 98.6 (18 @ 13:40), Max: 98.6 (18 @ 13:40)  HR: 113 (18 @ 13:40) (103 - 113)  BP: 137/97 (18 @ 13:40) (117/84 - 137/97)  RR: 18 (18 @ 13:40) (18 - 18)  SpO2: 96% (18 @ 13:40) (95% - 97%)                    @ 07:01  -   @ 07:00  --------------------------------------------------------  IN: 420 mL / OUT: 675 mL / NET: -255 mL     @ 07:01  -   @ 14:01  --------------------------------------------------------  IN: 440 mL / OUT: 301 mL / NET: 139 mL    Daily Weight in k.1 (20 Sep 2018 07:26)      POCT Blood Glucose.: 115 mg/dL (20 Sep 2018 11:50)  POCT Blood Glucose.: 105 mg/dL (20 Sep 2018 07:26)  POCT Blood Glucose.: 112 mg/dL (19 Sep 2018 22:13)  POCT Blood Glucose.: 138 mg/dL (19 Sep 2018 16:37)          Drains:     MS         [  ] Drainage:                 L Pleural  [  ]  Drainage:                R Pleural  [  ]  Drainage:    Pacing Wires        [  ]   Settings:                                  Isolated  [  ]    Coumadin    [ ] YES          [  ]      NO         Reason:                                 7.9    3.9   )-----------( 595      ( 20 Sep 2018 09:17 )             26.6           135  |  102  |  16  ----------------------------<  99  5.0   |  23  |  1.25    Ca    9.3      20 Sep 2018 09:17            PHYSICAL EXAM:    Neurology: alert and oriented x 3, nonfocal, no gross deficits    CV :  S1S2 heard RRR    Lungs:  clear to ausc    Abdomen: soft, nontender, nondistended, positive bowel sounds, last bowel movement     Extremities:   no edema            acetaminophen   Tablet .. 650 milliGRAM(s) Oral every 6 hours PRN  acetaminophen   Tablet .. 650 milliGRAM(s) Oral every 6 hours PRN  aspirin enteric coated 81 milliGRAM(s) Oral daily  Calcium citrate + vit d3 315mg/250 IU 2 Tablet(s) 2 Tablet(s) Oral two times a day  dextrose 40% Gel 15 Gram(s) Oral once PRN  dextrose 5%. 1000 milliLiter(s) IV Continuous <Continuous>  dextrose 50% Injectable 12.5 Gram(s) IV Push once  dextrose 50% Injectable 25 Gram(s) IV Push once  dextrose 50% Injectable 25 Gram(s) IV Push once  famotidine    Tablet 20 milliGRAM(s) Oral daily  glucagon  Injectable 1 milliGRAM(s) IntraMuscular once PRN  imipenem/cilastatin  IVPB 500 milliGRAM(s) IV Intermittent every 8 hours  insulin lispro (HumaLOG) corrective regimen sliding scale   SubCutaneous three times a day before meals  insulin lispro (HumaLOG) corrective regimen sliding scale   SubCutaneous at bedtime  magnesium oxide 400 milliGRAM(s) Oral three times a day with meals  multivitamin 1 Tablet(s) Oral daily  pravastatin 20 milliGRAM(s) Oral at bedtime  predniSONE   Tablet 2 milliGRAM(s) Oral <User Schedule>  sodium bicarbonate 650 milliGRAM(s) Oral <User Schedule>  sodium chloride 0.9% lock flush 3 milliLiter(s) IV Push every 8 hours  tacrolimus 2 milliGRAM(s) Oral <User Schedule>  trimethoprim   80 mG/sulfamethoxazole 400 mG 1 Tablet(s) Oral daily  vancomycin    Solution 125 milliGRAM(s) Oral every 12 hours                              Physical Therapy Rec:   Home  [  ]   Home w/ PT  [  ]  Rehab  [ x ]    Discussed with Cardiothoracic Team at AM rounds.

## 2018-09-20 NOTE — PROGRESS NOTE ADULT - ASSESSMENT
69 yo M s/p heart transplant complicated by rejection with rounds of plasmaphoresis , IVIG, rituximab x2, known colonizer with  Pseudomonas was treated for PNA in June 2018  and again in August 14-30 with cefepime and voriconazole as well as c.diff with PO vanco presented this admission with decreased PO intake, 4 watery BM daily and a productive cough. Pt had CT guided R lung mass bx on 8/17 which showed acute inflammation but was negative bacterial or fungal etiology. 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     last admission labs:   First CT guided biopsy cultures are NGTD- only grew a corynebacterium that is likely not a pathogen  Spoke with the micro. 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  He is responding again to C.diff treatment; switched from cefepime to imipenem for concern of nocardia        Suggest:  - Continue IV Imipenem inpatient and Ertapenem outpatient pending sensitivities of nocardia species- treatment duration for 6 months   - Ertapenem approved by insurance - approval # [id number]   - would repeat chest CT imaging after about 2weeks to check for improvement in infiltrates  - continue Bactrim for both the nocardia and for OI prophylaxis  - check CMV viral load weekly while off ganciclovir  - continue Vanco taper for C.diff (125 mg orally twice daily for 7 days from 9/20-9/26 , then 125 mg orally once daily for 7 days 9/27-10/3, then 125 mg orally every 2 or 3 days for 8 weeks)

## 2018-09-20 NOTE — PROGRESS NOTE ADULT - PROBLEM SELECTOR PLAN 2
- Tacrolimus level pending. Goal 8 to 10.   - will cont tacro dosing based on above level  - Continue prednisone 2 mg daily.    - Continue off of Cellcept. - Tacrolimus level pending. Goal 8 to 10.   - Will change tacro to 1 mg qAM and 2 mg qPM.   - Continue prednisone 2 mg daily.    - Continue off of Cellcept.

## 2018-09-21 LAB
ANION GAP SERPL CALC-SCNC: 8 MMOL/L — SIGNIFICANT CHANGE UP (ref 5–17)
BUN SERPL-MCNC: 14 MG/DL — SIGNIFICANT CHANGE UP (ref 7–23)
CALCIUM SERPL-MCNC: 9.4 MG/DL — SIGNIFICANT CHANGE UP (ref 8.4–10.5)
CHLORIDE SERPL-SCNC: 100 MMOL/L — SIGNIFICANT CHANGE UP (ref 96–108)
CO2 SERPL-SCNC: 24 MMOL/L — SIGNIFICANT CHANGE UP (ref 22–31)
CREAT SERPL-MCNC: 1.09 MG/DL — SIGNIFICANT CHANGE UP (ref 0.5–1.3)
GLUCOSE BLDC GLUCOMTR-MCNC: 121 MG/DL — HIGH (ref 70–99)
GLUCOSE BLDC GLUCOMTR-MCNC: 88 MG/DL — SIGNIFICANT CHANGE UP (ref 70–99)
GLUCOSE BLDC GLUCOMTR-MCNC: 94 MG/DL — SIGNIFICANT CHANGE UP (ref 70–99)
GLUCOSE BLDC GLUCOMTR-MCNC: 95 MG/DL — SIGNIFICANT CHANGE UP (ref 70–99)
GLUCOSE SERPL-MCNC: 96 MG/DL — SIGNIFICANT CHANGE UP (ref 70–99)
HCT VFR BLD CALC: 27.1 % — LOW (ref 39–50)
HCV RNA SERPL NAA DL=5-ACNC: SIGNIFICANT CHANGE UP IU/ML
HCV RNA SPEC NAA+PROBE-LOG IU: SIGNIFICANT CHANGE UP LOGIU/ML
HGB BLD-MCNC: 8.2 G/DL — LOW (ref 13–17)
MCHC RBC-ENTMCNC: 26.3 PG — LOW (ref 27–34)
MCHC RBC-ENTMCNC: 30.2 GM/DL — LOW (ref 32–36)
MCV RBC AUTO: 87.1 FL — SIGNIFICANT CHANGE UP (ref 80–100)
PLATELET # BLD AUTO: 569 K/UL — HIGH (ref 150–400)
POTASSIUM SERPL-MCNC: 5.3 MMOL/L — SIGNIFICANT CHANGE UP (ref 3.5–5.3)
POTASSIUM SERPL-SCNC: 5.3 MMOL/L — SIGNIFICANT CHANGE UP (ref 3.5–5.3)
RBC # BLD: 3.11 M/UL — LOW (ref 4.2–5.8)
RBC # FLD: 20.4 % — HIGH (ref 10.3–14.5)
SODIUM SERPL-SCNC: 132 MMOL/L — LOW (ref 135–145)
TACROLIMUS SERPL-MCNC: 3.6 NG/ML — SIGNIFICANT CHANGE UP
WBC # BLD: 4.4 K/UL — SIGNIFICANT CHANGE UP (ref 3.8–10.5)
WBC # FLD AUTO: 4.4 K/UL — SIGNIFICANT CHANGE UP (ref 3.8–10.5)

## 2018-09-21 PROCEDURE — 99232 SBSQ HOSP IP/OBS MODERATE 35: CPT | Mod: GC

## 2018-09-21 PROCEDURE — 99233 SBSQ HOSP IP/OBS HIGH 50: CPT | Mod: GC

## 2018-09-21 RX ORDER — TACROLIMUS 5 MG/1
3 CAPSULE ORAL
Qty: 0 | Refills: 0 | Status: DISCONTINUED | OUTPATIENT
Start: 2018-09-21 | End: 2018-09-24

## 2018-09-21 RX ADMIN — IMIPENEM AND CILASTATIN 100 MILLIGRAM(S): 250; 250 INJECTION, POWDER, FOR SOLUTION INTRAVENOUS at 05:19

## 2018-09-21 RX ADMIN — Medication 650 MILLIGRAM(S): at 08:01

## 2018-09-21 RX ADMIN — Medication 1 TABLET(S): at 13:46

## 2018-09-21 RX ADMIN — IMIPENEM AND CILASTATIN 100 MILLIGRAM(S): 250; 250 INJECTION, POWDER, FOR SOLUTION INTRAVENOUS at 21:50

## 2018-09-21 RX ADMIN — Medication 1 TABLET(S): at 13:45

## 2018-09-21 RX ADMIN — TACROLIMUS 1 MILLIGRAM(S): 5 CAPSULE ORAL at 09:58

## 2018-09-21 RX ADMIN — IMIPENEM AND CILASTATIN 100 MILLIGRAM(S): 250; 250 INJECTION, POWDER, FOR SOLUTION INTRAVENOUS at 13:47

## 2018-09-21 RX ADMIN — Medication 81 MILLIGRAM(S): at 13:45

## 2018-09-21 RX ADMIN — TACROLIMUS 3 MILLIGRAM(S): 5 CAPSULE ORAL at 20:09

## 2018-09-21 RX ADMIN — SODIUM CHLORIDE 3 MILLILITER(S): 9 INJECTION INTRAMUSCULAR; INTRAVENOUS; SUBCUTANEOUS at 21:52

## 2018-09-21 RX ADMIN — MAGNESIUM OXIDE 400 MG ORAL TABLET 400 MILLIGRAM(S): 241.3 TABLET ORAL at 18:14

## 2018-09-21 RX ADMIN — Medication 125 MILLIGRAM(S): at 18:14

## 2018-09-21 RX ADMIN — MAGNESIUM OXIDE 400 MG ORAL TABLET 400 MILLIGRAM(S): 241.3 TABLET ORAL at 08:01

## 2018-09-21 RX ADMIN — MAGNESIUM OXIDE 400 MG ORAL TABLET 400 MILLIGRAM(S): 241.3 TABLET ORAL at 13:36

## 2018-09-21 RX ADMIN — Medication 20 MILLIGRAM(S): at 21:51

## 2018-09-21 RX ADMIN — Medication 2 MILLIGRAM(S): at 08:01

## 2018-09-21 RX ADMIN — Medication 125 MILLIGRAM(S): at 05:19

## 2018-09-21 RX ADMIN — Medication 650 MILLIGRAM(S): at 20:09

## 2018-09-21 RX ADMIN — SODIUM CHLORIDE 3 MILLILITER(S): 9 INJECTION INTRAMUSCULAR; INTRAVENOUS; SUBCUTANEOUS at 13:46

## 2018-09-21 RX ADMIN — FAMOTIDINE 20 MILLIGRAM(S): 10 INJECTION INTRAVENOUS at 13:47

## 2018-09-21 RX ADMIN — SODIUM CHLORIDE 3 MILLILITER(S): 9 INJECTION INTRAMUSCULAR; INTRAVENOUS; SUBCUTANEOUS at 05:18

## 2018-09-21 NOTE — PROGRESS NOTE ADULT - PROBLEM SELECTOR PLAN 2
- Tacrolimus level pending. Goal 8 to 10.   - Will adjust accordingly   - Continue prednisone 2 mg daily.    - Continue off of Cellcept. - Tacrolimus level 3.6. Goal 8 to 10.   - Will increase to 3/3   - Continue prednisone 2 mg daily.    - Continue off of Cellcept.

## 2018-09-21 NOTE — PROGRESS NOTE ADULT - ASSESSMENT
69 yo M s/p heart transplant complicated by rejection with rounds of plasmaphoresis , IVIG, rituximab x2, known colonizer with  Pseudomonas was treated for PNA in June 2018  and again in August 14-30 with cefepime and voriconazole as well as c.diff with PO vanco presented this admission with decreased PO intake, 4 watery BM daily and a productive cough. Pt had CT guided R lung mass bx on 8/17 which showed acute inflammation but was negative bacterial or fungal etiology. 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     Suggest:  - Continue IV Imipenem inpatient and Ertapenem outpatient pending sensitivities of nocardia species- treatment duration for 6 months   - Ertapenem approved by insurance - approval # [id number]   - would repeat chest CT imaging after about 2weeks to check for improvement in infiltrates  - continue Bactrim for both the nocardia and for OI prophylaxis  - check CMV viral load weekly while off ganciclovir->check tomorrow 9/22  - continue Vanco taper for C.diff (125 mg orally twice daily for 7 days from 9/20-9/26 , then 125 mg orally once daily for 7 days 9/27-10/3, then 125 mg orally every 2 or 3 days for 8 weeks)

## 2018-09-21 NOTE — PROGRESS NOTE ADULT - ATTENDING COMMENTS
Patient seen and examined with DR. 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

## 2018-09-21 NOTE — PROGRESS NOTE ADULT - SUBJECTIVE AND OBJECTIVE BOX
Follow Up:  68yMale with History of heart transplant with nocardia pneumonia and cdiff.    Interval History/ROS: Reports feeling well without complaints. BM have normalized per him     CONSTITUTIONAL:  No fever, good appetite  CARDIOVASCULAR:  No chest pain or palpitations  RESPIRATORY:  No dyspnea  GASTROINTESTINAL:  No nausea, vomiting, diarrhea, or abdominal pain  GENITOURINARY:  No dysuria  NEUROLOGIC:  No headache,       Allergies  No Known Allergies        ANTIMICROBIALS:  imipenem/cilastatin  IVPB 500 every 8 hours  trimethoprim   80 mG/sulfamethoxazole 400 mG 1 daily  vancomycin    Solution 125 every 12 hours      OTHER MEDS:  MEDICATIONS  (STANDING):  acetaminophen   Tablet .. 650 every 6 hours PRN  acetaminophen   Tablet .. 650 every 6 hours PRN  aspirin enteric coated 81 daily  dextrose 40% Gel 15 once PRN  dextrose 50% Injectable 12.5 once  dextrose 50% Injectable 25 once  dextrose 50% Injectable 25 once  famotidine    Tablet 20 daily  glucagon  Injectable 1 once PRN  insulin lispro (HumaLOG) corrective regimen sliding scale  three times a day before meals  insulin lispro (HumaLOG) corrective regimen sliding scale  at bedtime  pravastatin 20 at bedtime  predniSONE   Tablet 2 <User Schedule>  tacrolimus 1 <User Schedule>  tacrolimus 2 <User Schedule>      Vital Signs Last 24 Hrs  T(C): 36.7 (21 Sep 2018 05:22), Max: 37 (20 Sep 2018 13:40)  T(F): 98 (21 Sep 2018 05:22), Max: 98.6 (20 Sep 2018 13:40)  HR: 110 (21 Sep 2018 05:22) (109 - 113)  BP: 125/79 (21 Sep 2018 05:22) (121/81 - 137/97)  BP(mean): --  RR: 18 (21 Sep 2018 05:22) (18 - 18)  SpO2: 99% (21 Sep 2018 05:22) (96% - 99%)    PHYSICAL EXAM:  Constitutional:no acute distress  Eyes:WALT, EOMI  Ear/Nose/Throat: no oral lesions, 	  Respiratory: clear BL  Cardiovascular: S1S2  Gastrointestinal:soft, (+) BS, no tenderness  Extremities:no e/e/c, RUE picc line nontender, nonerythematous   No Lymphadenopathy  IV sites not inflammed.                          8.2    4.4   )-----------( 569      ( 21 Sep 2018 09:55 )             27.1       09-21    132<L>  |  100  |  14  ----------------------------<  96  5.3   |  24  |  1.09    Ca    9.4      21 Sep 2018 09:55            MICROBIOLOGY:  trisha Meyer  09-07-18   Moderate Rule Out Nocardia species  Sent to  [facility]   for Identification  Susceptibility to follow.  --  --      .Body Fluid Pleural Fluid  09-07-18   Moderate Rule Out Nocardia species  --    Upon re-evaluation of gram stain:  polymorphonuclear leukocytes seen per low power field  Gram Positive Rods  by cytocentrifuge  Previously reported as:  Moderate polymorphonuclear leukocytes per low power field  No organisms seen per oil power field        Toxoplasma IgG Screen: 4.7 IU/mL (06-14-18 @ 09:42)  HIV-1 RNA Quantitative, Viral Load Log: NOT DET. lg /mL (05-31-18 @ 19:10)  CMV IgG Antibody: 5.40 U/mL (05-25-18 @ 17:51)  CMV IgG Antibody: >10.00 U/mL (02-22-18 @ 23:45)  HIV-1 RNA Quantitative, Viral Load Log: NOT DET. lg /mL (02-02-18 @ 19:25)    Cytomegalovirus By PCR (09.14.18 @ 09:37) CMVPCR Log: NotDetec          RADIOLOGY:  < from: Xray Chest 1 View- PORTABLE-Routine (09.17.18 @ 05:41) >    IMPRESSION:     Peripheral right lower lobe opacity, unchanged. Please correlate to FNA   dated 9/7/2018.  Right basilar atelectasis.    < end of copied text >

## 2018-09-21 NOTE — PHARMACOTHERAPY INTERVENTION NOTE - COMMENTS
Based on today's trough, tacrolimus dose was changed from 1 mG AM and 2 mG HS to 3 mG PO twice a day. The plan was discussed with heart transplant team, and the dose will be adjusted based on the troughs.
The patient is receiving tacrolimus 1 mG PO twice a day - heart transplant. Today's trough is 5.3. The patient was recently on voriconazole (voriconazole likely inhibits intestinal and hepatic CY isoenzymes reducing metabolism and increasing bioavailability of tacrolimus), and the dose was lowered/adjusted to account for the drug interaction. However, now the dose needs to be increased - changed the order to 2 mG PO BID, and the dose will be adjusted based on the future levels. Spoke to heat transplant coordinator, Dr. Stahl.

## 2018-09-21 NOTE — PROGRESS NOTE ADULT - ASSESSMENT
Mr. 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 He also has had persistent C. diff diarrhea and continues on therapy with improvement. He has no evidence of graft dysfunction on exam and he is euvolemic. Plan for discharge on IV ertapenem.

## 2018-09-21 NOTE — PROGRESS NOTE ADULT - SUBJECTIVE AND OBJECTIVE BOX
Pt denies CP, SOB. Had 2 BM yesterday.     MEDICATIONS:  aspirin enteric coated 81 milliGRAM(s) Oral daily  imipenem/cilastatin  IVPB 500 milliGRAM(s) IV Intermittent every 8 hours  trimethoprim   80 mG/sulfamethoxazole 400 mG 1 Tablet(s) Oral daily  vancomycin    Solution 125 milliGRAM(s) Oral every 12 hours  acetaminophen   Tablet .. 650 milliGRAM(s) Oral every 6 hours PRN  acetaminophen   Tablet .. 650 milliGRAM(s) Oral every 6 hours PRN  famotidine    Tablet 20 milliGRAM(s) Oral daily  dextrose 40% Gel 15 Gram(s) Oral once PRN  dextrose 50% Injectable 12.5 Gram(s) IV Push once  dextrose 50% Injectable 25 Gram(s) IV Push once  dextrose 50% Injectable 25 Gram(s) IV Push once  glucagon  Injectable 1 milliGRAM(s) IntraMuscular once PRN  insulin lispro (HumaLOG) corrective regimen sliding scale   SubCutaneous three times a day before meals  insulin lispro (HumaLOG) corrective regimen sliding scale   SubCutaneous at bedtime  pravastatin 20 milliGRAM(s) Oral at bedtime  predniSONE   Tablet 2 milliGRAM(s) Oral <User Schedule>  dextrose 5%. 1000 milliLiter(s) IV Continuous <Continuous>  magnesium oxide 400 milliGRAM(s) Oral three times a day with meals  multivitamin 1 Tablet(s) Oral daily  sodium bicarbonate 650 milliGRAM(s) Oral <User Schedule>  sodium chloride 0.9% lock flush 3 milliLiter(s) IV Push every 8 hours  tacrolimus 1 milliGRAM(s) Oral <User Schedule>  tacrolimus 2 milliGRAM(s) Oral <User Schedule>      PHYSICAL EXAM:  T(C): 36.7 (09-21-18 @ 05:22), Max: 37 (09-20-18 @ 13:40)  HR: 110 (09-21-18 @ 05:22) (109 - 113)  BP: 125/79 (09-21-18 @ 05:22) (121/81 - 137/97)  RR: 18 (09-21-18 @ 05:22) (18 - 18)  SpO2: 99% (09-21-18 @ 05:22) (96% - 99%)  Wt(kg): --  I&O's Summary    20 Sep 2018 07:01  -  21 Sep 2018 07:00  --------------------------------------------------------  IN: 740 mL / OUT: 1403 mL / NET: -663 mL    21 Sep 2018 07:01  -  21 Sep 2018 11:29  --------------------------------------------------------  IN: 0 mL / OUT: 151 mL / NET: -151 mL    Appearance: Normal, NAD   HEENT:   Normal oral mucosa, PERRL, EOMI	  Lymphatic: No lymphadenopathy  Cardiovascular: Normal S1 S2, No JVD, No murmurs, No edema  Respiratory: Lungs clear to auscultation	  Psychiatry: A & O x 3, Mood & affect appropriate  Gastrointestinal:  Soft, Non-tender, + BS	  Skin: No rashes, No ecchymoses, No cyanosis	  Neurologic: Non-focal  Extremities: Normal range of motion, No clubbing, cyanosis or edema  Vascular: Peripheral pulses palpable bilaterally    LABS:	 	    CBC Full  -  ( 21 Sep 2018 09:55 )  WBC Count : 4.4 K/uL  Hemoglobin : 8.2 g/dL  Hematocrit : 27.1 %  Platelet Count - Automated : 569 K/uL  Mean Cell Volume : 87.1 fl  Mean Cell Hemoglobin : 26.3 pg  Mean Cell Hemoglobin Concentration : 30.2 gm/dL  Auto Neutrophil # : x  Auto Lymphocyte # : x  Auto Monocyte # : x  Auto Eosinophil # : x  Auto Basophil # : x  Auto Neutrophil % : x  Auto Lymphocyte % : x  Auto Monocyte % : x  Auto Eosinophil % : x  Auto Basophil % : x    09-21    132<L>  |  100  |  14  ----------------------------<  96  5.3   |  24  |  1.09  09-20    135  |  102  |  16  ----------------------------<  99  5.0   |  23  |  1.25    Ca    9.4      21 Sep 2018 09:55  Ca    9.3      20 Sep 2018 09:17

## 2018-09-21 NOTE — PROGRESS NOTE ADULT - PROBLEM SELECTOR PLAN 1
- Continue Imipenem while inpatient per ID recommendations. Plan will be for total of six months of therapy, which will transition to Ertapenem at discharge  - Will plan for repeat CT scan in 2 weeks to assess for interval improvement

## 2018-09-22 DIAGNOSIS — E87.5 HYPERKALEMIA: ICD-10-CM

## 2018-09-22 LAB
ANION GAP SERPL CALC-SCNC: 11 MMOL/L — SIGNIFICANT CHANGE UP (ref 5–17)
BUN SERPL-MCNC: 12 MG/DL — SIGNIFICANT CHANGE UP (ref 7–23)
CALCIUM SERPL-MCNC: 9.4 MG/DL — SIGNIFICANT CHANGE UP (ref 8.4–10.5)
CHLORIDE SERPL-SCNC: 103 MMOL/L — SIGNIFICANT CHANGE UP (ref 96–108)
CO2 SERPL-SCNC: 21 MMOL/L — LOW (ref 22–31)
CREAT SERPL-MCNC: 1.03 MG/DL — SIGNIFICANT CHANGE UP (ref 0.5–1.3)
GLUCOSE BLDC GLUCOMTR-MCNC: 101 MG/DL — HIGH (ref 70–99)
GLUCOSE BLDC GLUCOMTR-MCNC: 107 MG/DL — HIGH (ref 70–99)
GLUCOSE BLDC GLUCOMTR-MCNC: 108 MG/DL — HIGH (ref 70–99)
GLUCOSE BLDC GLUCOMTR-MCNC: 86 MG/DL — SIGNIFICANT CHANGE UP (ref 70–99)
GLUCOSE SERPL-MCNC: 98 MG/DL — SIGNIFICANT CHANGE UP (ref 70–99)
HCT VFR BLD CALC: 29.6 % — LOW (ref 39–50)
HGB BLD-MCNC: 9.2 G/DL — LOW (ref 13–17)
MCHC RBC-ENTMCNC: 27.6 PG — SIGNIFICANT CHANGE UP (ref 27–34)
MCHC RBC-ENTMCNC: 31.2 GM/DL — LOW (ref 32–36)
MCV RBC AUTO: 88.6 FL — SIGNIFICANT CHANGE UP (ref 80–100)
PLATELET # BLD AUTO: 525 K/UL — HIGH (ref 150–400)
POTASSIUM SERPL-MCNC: 5.9 MMOL/L — HIGH (ref 3.5–5.3)
POTASSIUM SERPL-SCNC: 5.9 MMOL/L — HIGH (ref 3.5–5.3)
RBC # BLD: 3.34 M/UL — LOW (ref 4.2–5.8)
RBC # FLD: 21.1 % — HIGH (ref 10.3–14.5)
SODIUM SERPL-SCNC: 135 MMOL/L — SIGNIFICANT CHANGE UP (ref 135–145)
TACROLIMUS SERPL-MCNC: 4 NG/ML — SIGNIFICANT CHANGE UP
WBC # BLD: 3.9 K/UL — SIGNIFICANT CHANGE UP (ref 3.8–10.5)
WBC # FLD AUTO: 3.9 K/UL — SIGNIFICANT CHANGE UP (ref 3.8–10.5)

## 2018-09-22 PROCEDURE — 99233 SBSQ HOSP IP/OBS HIGH 50: CPT | Mod: GC

## 2018-09-22 PROCEDURE — 99231 SBSQ HOSP IP/OBS SF/LOW 25: CPT

## 2018-09-22 RX ORDER — FUROSEMIDE 40 MG
20 TABLET ORAL ONCE
Qty: 0 | Refills: 0 | Status: COMPLETED | OUTPATIENT
Start: 2018-09-22 | End: 2018-09-22

## 2018-09-22 RX ORDER — ATOVAQUONE 750 MG/5ML
1500 SUSPENSION ORAL DAILY
Qty: 0 | Refills: 0 | Status: DISCONTINUED | OUTPATIENT
Start: 2018-09-22 | End: 2018-09-26

## 2018-09-22 RX ORDER — POTASSIUM CHLORIDE 20 MEQ
20 PACKET (EA) ORAL ONCE
Qty: 0 | Refills: 0 | Status: DISCONTINUED | OUTPATIENT
Start: 2018-09-22 | End: 2018-09-22

## 2018-09-22 RX ADMIN — TACROLIMUS 3 MILLIGRAM(S): 5 CAPSULE ORAL at 08:56

## 2018-09-22 RX ADMIN — Medication 1 TABLET(S): at 11:59

## 2018-09-22 RX ADMIN — ATOVAQUONE 1500 MILLIGRAM(S): 750 SUSPENSION ORAL at 18:11

## 2018-09-22 RX ADMIN — Medication 125 MILLIGRAM(S): at 18:11

## 2018-09-22 RX ADMIN — Medication 81 MILLIGRAM(S): at 11:59

## 2018-09-22 RX ADMIN — Medication 650 MILLIGRAM(S): at 19:49

## 2018-09-22 RX ADMIN — Medication 650 MILLIGRAM(S): at 08:56

## 2018-09-22 RX ADMIN — TACROLIMUS 3 MILLIGRAM(S): 5 CAPSULE ORAL at 19:50

## 2018-09-22 RX ADMIN — IMIPENEM AND CILASTATIN 100 MILLIGRAM(S): 250; 250 INJECTION, POWDER, FOR SOLUTION INTRAVENOUS at 13:59

## 2018-09-22 RX ADMIN — MAGNESIUM OXIDE 400 MG ORAL TABLET 400 MILLIGRAM(S): 241.3 TABLET ORAL at 08:56

## 2018-09-22 RX ADMIN — Medication 20 MILLIGRAM(S): at 19:46

## 2018-09-22 RX ADMIN — IMIPENEM AND CILASTATIN 100 MILLIGRAM(S): 250; 250 INJECTION, POWDER, FOR SOLUTION INTRAVENOUS at 05:53

## 2018-09-22 RX ADMIN — Medication 20 MILLIGRAM(S): at 22:37

## 2018-09-22 RX ADMIN — FAMOTIDINE 20 MILLIGRAM(S): 10 INJECTION INTRAVENOUS at 11:59

## 2018-09-22 RX ADMIN — Medication 125 MILLIGRAM(S): at 05:53

## 2018-09-22 RX ADMIN — MAGNESIUM OXIDE 400 MG ORAL TABLET 400 MILLIGRAM(S): 241.3 TABLET ORAL at 18:11

## 2018-09-22 RX ADMIN — SODIUM CHLORIDE 3 MILLILITER(S): 9 INJECTION INTRAMUSCULAR; INTRAVENOUS; SUBCUTANEOUS at 22:39

## 2018-09-22 RX ADMIN — SODIUM CHLORIDE 3 MILLILITER(S): 9 INJECTION INTRAMUSCULAR; INTRAVENOUS; SUBCUTANEOUS at 13:44

## 2018-09-22 RX ADMIN — Medication 2 MILLIGRAM(S): at 08:56

## 2018-09-22 RX ADMIN — IMIPENEM AND CILASTATIN 100 MILLIGRAM(S): 250; 250 INJECTION, POWDER, FOR SOLUTION INTRAVENOUS at 22:38

## 2018-09-22 RX ADMIN — MAGNESIUM OXIDE 400 MG ORAL TABLET 400 MILLIGRAM(S): 241.3 TABLET ORAL at 11:59

## 2018-09-22 RX ADMIN — SODIUM CHLORIDE 3 MILLILITER(S): 9 INJECTION INTRAMUSCULAR; INTRAVENOUS; SUBCUTANEOUS at 05:52

## 2018-09-22 NOTE — PROGRESS NOTE ADULT - SUBJECTIVE AND OBJECTIVE BOX
Had one BM yesterday. Denies Cp, SOB.     MEDICATIONS:  aspirin enteric coated 81 milliGRAM(s) Oral daily  imipenem/cilastatin  IVPB 500 milliGRAM(s) IV Intermittent every 8 hours  trimethoprim   80 mG/sulfamethoxazole 400 mG 1 Tablet(s) Oral daily  vancomycin    Solution 125 milliGRAM(s) Oral every 12 hours  acetaminophen   Tablet .. 650 milliGRAM(s) Oral every 6 hours PRN  acetaminophen   Tablet .. 650 milliGRAM(s) Oral every 6 hours PRN  famotidine    Tablet 20 milliGRAM(s) Oral daily  dextrose 40% Gel 15 Gram(s) Oral once PRN  dextrose 50% Injectable 12.5 Gram(s) IV Push once  dextrose 50% Injectable 25 Gram(s) IV Push once  dextrose 50% Injectable 25 Gram(s) IV Push once  glucagon  Injectable 1 milliGRAM(s) IntraMuscular once PRN  insulin lispro (HumaLOG) corrective regimen sliding scale   SubCutaneous three times a day before meals  insulin lispro (HumaLOG) corrective regimen sliding scale   SubCutaneous at bedtime  pravastatin 20 milliGRAM(s) Oral at bedtime  predniSONE   Tablet 2 milliGRAM(s) Oral <User Schedule>  dextrose 5%. 1000 milliLiter(s) IV Continuous <Continuous>  magnesium oxide 400 milliGRAM(s) Oral three times a day with meals  multivitamin 1 Tablet(s) Oral daily  sodium bicarbonate 650 milliGRAM(s) Oral <User Schedule>  sodium chloride 0.9% lock flush 3 milliLiter(s) IV Push every 8 hours  tacrolimus 3 milliGRAM(s) Oral <User Schedule>    PHYSICAL EXAM:  T(C): 36.5 (09-22-18 @ 04:55), Max: 36.8 (09-21-18 @ 20:20)  HR: 102 (09-22-18 @ 04:55) (102 - 106)  BP: 120/87 (09-22-18 @ 04:55) (119/85 - 120/87)  RR: 18 (09-22-18 @ 04:55) (18 - 18)  SpO2: 97% (09-22-18 @ 04:55) (95% - 97%)  Wt(kg): --  I&O's Summary    21 Sep 2018 07:01  -  22 Sep 2018 07:00  --------------------------------------------------------  IN: 100 mL / OUT: 1102 mL / NET: -1002 mL    22 Sep 2018 07:01  -  22 Sep 2018 11:03  --------------------------------------------------------  IN: 120 mL / OUT: 1 mL / NET: 119 mL    Appearance: Normal	  HEENT:   Normal oral mucosa, PERRL, EOMI	  Lymphatic: No lymphadenopathy  Cardiovascular: Normal S1 S2, No JVD, No murmurs, No edema  Respiratory: Lungs clear to auscultation	  Psychiatry: A & O x 3, Mood & affect appropriate  Gastrointestinal:  Soft, Non-tender, + BS	  Skin: No rashes, No ecchymoses, No cyanosis	  Neurologic: Non-focal  Extremities: Normal range of motion, No clubbing, cyanosis or edema  Vascular: Peripheral pulses palpable bilaterally      LABS:	 	    CBC Full  -  ( 22 Sep 2018 07:07 )  WBC Count : 3.9 K/uL  Hemoglobin : 9.2 g/dL  Hematocrit : 29.6 %  Platelet Count - Automated : 525 K/uL  Mean Cell Volume : 88.6 fl  Mean Cell Hemoglobin : 27.6 pg  Mean Cell Hemoglobin Concentration : 31.2 gm/dL  Auto Neutrophil # : x  Auto Lymphocyte # : x  Auto Monocyte # : x  Auto Eosinophil # : x  Auto Basophil # : x  Auto Neutrophil % : x  Auto Lymphocyte % : x  Auto Monocyte % : x  Auto Eosinophil % : x  Auto Basophil % : x    09-22    135  |  103  |  12  ----------------------------<  98  5.9<H>   |  21<L>  |  1.03  09-21    132<L>  |  100  |  14  ----------------------------<  96  5.3   |  24  |  1.09    Ca    9.4      22 Sep 2018 07:07  Ca    9.4      21 Sep 2018 09:55    tele: NSR 100s - 110s

## 2018-09-22 NOTE — PROGRESS NOTE ADULT - ASSESSMENT
69 y/o male with PMH NICM HFrEF s/p HM2 LVAD 6/2017, who underwent heart transplant on 2/23/18 (CMV D-/R+ and Toxo D-/R+, Hep C+ heart) with post op graft dysfunction who underwent plasmapheresis and IVIG x2 as well as rituximab, with suspected fungal PNA diagnosed 6/2018  treated w voriconazole, recurrent PNA with new RUL infiltrate on CT scan 8/18, which  improved on broad spectrum antibiotics. S/p lung biopsy 8/18. C diff toxin positive -still  on oral vanco,.  Patient presents today for RHC and cardiac biopsy with c/o ongoing diarrhea (3-4 loose BM daily) and mildly productive cough, denies fevers, chills, abd pain, N/V. EKG demonstrated ST at 133bpm. Pt readmitted for further workup.  9/6 one formed BM this am; CMV study's pending, BC pending afebrile, albumin 500 cc for dehydration  potassium supplemented, transition to regular diet.   9/7 VVS; underwent IR procedure of Right lung mass FNA.  Tolerated procedure well.  Culture pending. Vanco IV D/C per ID. Continue with current medication regimen. Send Legionella urine culture and check voriconazole (T) in AM   Disposition: Home once medically cleared   9/8 + fever x1 101.7 as per ID cefepine decreased to 2g bid ivpb  9/9 VSS. 1 liquid/soft BM. Heme consulted for neutropenia possible bone marrow Bx. Deferring Bx at this time until anemia work up complete. Tacro 11.8. 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  Trying to work out antibiotic coverage with insurance  9/20  Hd stable.  Tacro 5.3.  Per HF increase to 2mg in pm.  Insurance approved antbx.  CRUZ sent to facilities.  Awaiting acceptance.    922 tacro 4  3mg am and 3mg pm, offf bactrim  due to hyperkalemia, mepron restarted

## 2018-09-22 NOTE — PROGRESS NOTE ADULT - PROBLEM SELECTOR PLAN 3
Given h/o diarrhea and c. diff will opt for diuretic to control hyperkalemia.   Lasix 20 IV x 1   Unclear etiology   if not improved will consider renal c/s

## 2018-09-22 NOTE — PROGRESS NOTE ADULT - PROBLEM SELECTOR PLAN 2
- Tacrolimus level 4.0. Goal 8 to 10.   - Will continue to 3/3   - Continue prednisone 2 mg daily.    - Continue off of Cellcept. - Tacrolimus level 4.0. Goal 8 to 10.   - Will increase to 3/3   - Continue prednisone 2 mg daily.    - Continue off of Cellcept.

## 2018-09-22 NOTE — PROGRESS NOTE ADULT - SUBJECTIVE AND OBJECTIVE BOX
Seen and examined. Resting comfortable. No acute events overnight  VITAL SIGNS    Telemetry:  -110  Vital Signs Last 24 Hrs  T(C): 36.8 (18 @ 13:30), Max: 36.8 (18 @ 20:20)  T(F): 98.2 (18 @ 13:30), Max: 98.2 (18 @ 20:20)  HR: 104 (18 @ 13:30) (102 - 106)  BP: 116/87 (18 @ 13:30) (116/87 - 120/87)  RR: 18 (18 @ 13:30) (18 - 18)  SpO2: 96% (18 @ 13:30) (95% - 97%)             @ 07:01  -   @ 07:00  --------------------------------------------------------  IN: 100 mL / OUT: 1102 mL / NET: -1002 mL     @ 07:01  -   @ 17:30  --------------------------------------------------------  IN: 240 mL / OUT: 1 mL / NET: 239 mL       Daily     Daily Weight in k.3 (22 Sep 2018 07:17)  Admit Wt: Drug Dosing Weight  Height (cm): 172.72 (05 Sep 2018 12:20)  Weight (kg): 70.6 (16 Sep 2018 08:00)  BMI (kg/m2): 23.7 (16 Sep 2018 08:00)  BSA (m2): 1.84 (16 Sep 2018 08:00)      CAPILLARY BLOOD GLUCOSE      POCT Blood Glucose.: 108 mg/dL (22 Sep 2018 11:41)  POCT Blood Glucose.: 107 mg/dL (22 Sep 2018 07:38)  POCT Blood Glucose.: 121 mg/dL (21 Sep 2018 21:49)  POCT Blood Glucose.: 88 mg/dL (21 Sep 2018 19:36)          MEDICATIONS  acetaminophen   Tablet .. 650 milliGRAM(s) Oral every 6 hours PRN  acetaminophen   Tablet .. 650 milliGRAM(s) Oral every 6 hours PRN  aspirin enteric coated 81 milliGRAM(s) Oral daily  atovaquone Suspension 1500 milliGRAM(s) Oral daily  Calcium citrate + vit d3 315mg/250 IU 2 Tablet(s) 2 Tablet(s) Oral two times a day  dextrose 40% Gel 15 Gram(s) Oral once PRN  dextrose 5%. 1000 milliLiter(s) IV Continuous <Continuous>  dextrose 50% Injectable 12.5 Gram(s) IV Push once  dextrose 50% Injectable 25 Gram(s) IV Push once  dextrose 50% Injectable 25 Gram(s) IV Push once  famotidine    Tablet 20 milliGRAM(s) Oral daily  glucagon  Injectable 1 milliGRAM(s) IntraMuscular once PRN  imipenem/cilastatin  IVPB 500 milliGRAM(s) IV Intermittent every 8 hours  insulin lispro (HumaLOG) corrective regimen sliding scale   SubCutaneous three times a day before meals  insulin lispro (HumaLOG) corrective regimen sliding scale   SubCutaneous at bedtime  magnesium oxide 400 milliGRAM(s) Oral three times a day with meals  multivitamin 1 Tablet(s) Oral daily  pravastatin 20 milliGRAM(s) Oral at bedtime  predniSONE   Tablet 2 milliGRAM(s) Oral <User Schedule>  sodium bicarbonate 650 milliGRAM(s) Oral <User Schedule>  sodium chloride 0.9% lock flush 3 milliLiter(s) IV Push every 8 hours  tacrolimus 3 milliGRAM(s) Oral <User Schedule>  vancomycin    Solution 125 milliGRAM(s) Oral every 12 hours      PHYSICAL EXAM    Subjective: no complaints   Neurology: alert and oriented x 3, nonfocal, no gross deficits  CV : s1s1 reg no MRGS  Sternal Wound :  CDI , Stable well healed   Lungs: CTA, equal chest expansion  Abdomen: soft, nontender, nondistended, positive bowel sounds, last bowel movement    :    voids  Extremities: Rt upper extremity midline,  RT groin no  hematoma   no edema,    b/l groins no hematoma, distal pulses  b/l , no calf tenderness b/l , warm and perfused b/l    LABS      135  |  103  |  12  ----------------------------<  98  5.9<H>   |  21<L>  |  1.03    Ca    9.4      22 Sep 2018 07:07                                   9.2    3.9   )-----------( 525      ( 22 Sep 2018 07:07 )             29.6                   PAST MEDICAL & SURGICAL HISTORY:  HLD (hyperlipidemia)  Former smoker  DVT of upper extremity (deep vein thrombosis)  Hepatitis C virus  GIB (gastrointestinal bleeding)  Ventricular fibrillation: s/p AICD  PAF (paroxysmal atrial fibrillation): on xarelto  Non-Ischemic Cardiomyopathy  SVT (Supraventricular Tachycardia)  HTN  CHF (Congestive Heart Failure)  S/P right heart catheterization: biopsy multiple  H/O heart transplant: 2018  Status post left hip replacement  History of Prior Ablation Treatment: for afib  AICD (Automatic Cardioverter/Defibrillator) Present: [employer] with 1 [employer] lead09- explanted and replaced with [employer] 2 leads on 09       Physical Therapy Rec:   Home  [  ]   Home w/ PT  [  ]  Rehab  [ x ]

## 2018-09-22 NOTE — PROGRESS NOTE ADULT - PROBLEM SELECTOR PLAN 1
d/w  HF  tac level  4  3mg am and pm   Continue prednisone 2mg daily  sodium bicarb BID.  Continue asa and statin.   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

## 2018-09-22 NOTE — PROGRESS NOTE ADULT - PROBLEM SELECTOR PLAN 2
s/p 9/7 Right lung FNA   ID following    -Continue IV Imipenem pending sensitivities of nocarida species  -insurance approved infusion and may need SNF placement  - would repeat chest CT imaging after about 2weeks to check for improvement in infiltrates  - off Bactrim due to  hyperkalemia   - check CMV viral load weekly while off ganciclovir  - continue Vanco taper for pst C.diff--decrease to 2x a day x 1 week

## 2018-09-23 LAB
ANION GAP SERPL CALC-SCNC: 12 MMOL/L — SIGNIFICANT CHANGE UP (ref 5–17)
BLD GP AB SCN SERPL QL: NEGATIVE — SIGNIFICANT CHANGE UP
BUN SERPL-MCNC: 14 MG/DL — SIGNIFICANT CHANGE UP (ref 7–23)
CALCIUM SERPL-MCNC: 9.7 MG/DL — SIGNIFICANT CHANGE UP (ref 8.4–10.5)
CHLORIDE SERPL-SCNC: 103 MMOL/L — SIGNIFICANT CHANGE UP (ref 96–108)
CO2 SERPL-SCNC: 20 MMOL/L — LOW (ref 22–31)
CREAT SERPL-MCNC: 1.36 MG/DL — HIGH (ref 0.5–1.3)
GLUCOSE BLDC GLUCOMTR-MCNC: 136 MG/DL — HIGH (ref 70–99)
GLUCOSE BLDC GLUCOMTR-MCNC: 89 MG/DL — SIGNIFICANT CHANGE UP (ref 70–99)
GLUCOSE BLDC GLUCOMTR-MCNC: 96 MG/DL — SIGNIFICANT CHANGE UP (ref 70–99)
GLUCOSE BLDC GLUCOMTR-MCNC: 97 MG/DL — SIGNIFICANT CHANGE UP (ref 70–99)
GLUCOSE SERPL-MCNC: 86 MG/DL — SIGNIFICANT CHANGE UP (ref 70–99)
HCT VFR BLD CALC: 27.8 % — LOW (ref 39–50)
HGB BLD-MCNC: 8.8 G/DL — LOW (ref 13–17)
MAGNESIUM SERPL-MCNC: 2 MG/DL — SIGNIFICANT CHANGE UP (ref 1.6–2.6)
MCHC RBC-ENTMCNC: 27.7 PG — SIGNIFICANT CHANGE UP (ref 27–34)
MCHC RBC-ENTMCNC: 31.4 GM/DL — LOW (ref 32–36)
MCV RBC AUTO: 88.1 FL — SIGNIFICANT CHANGE UP (ref 80–100)
PLATELET # BLD AUTO: 517 K/UL — HIGH (ref 150–400)
POTASSIUM SERPL-MCNC: 5.8 MMOL/L — HIGH (ref 3.5–5.3)
POTASSIUM SERPL-SCNC: 5.8 MMOL/L — HIGH (ref 3.5–5.3)
RBC # BLD: 3.16 M/UL — LOW (ref 4.2–5.8)
RBC # FLD: 21 % — HIGH (ref 10.3–14.5)
RH IG SCN BLD-IMP: POSITIVE — SIGNIFICANT CHANGE UP
SODIUM SERPL-SCNC: 135 MMOL/L — SIGNIFICANT CHANGE UP (ref 135–145)
TACROLIMUS SERPL-MCNC: 4.7 NG/ML — SIGNIFICANT CHANGE UP
WBC # BLD: 4 K/UL — SIGNIFICANT CHANGE UP (ref 3.8–10.5)
WBC # FLD AUTO: 4 K/UL — SIGNIFICANT CHANGE UP (ref 3.8–10.5)

## 2018-09-23 PROCEDURE — 99233 SBSQ HOSP IP/OBS HIGH 50: CPT | Mod: GC

## 2018-09-23 PROCEDURE — 99232 SBSQ HOSP IP/OBS MODERATE 35: CPT

## 2018-09-23 RX ADMIN — Medication 2 MILLIGRAM(S): at 08:01

## 2018-09-23 RX ADMIN — MAGNESIUM OXIDE 400 MG ORAL TABLET 400 MILLIGRAM(S): 241.3 TABLET ORAL at 13:16

## 2018-09-23 RX ADMIN — Medication 650 MILLIGRAM(S): at 20:21

## 2018-09-23 RX ADMIN — ATOVAQUONE 1500 MILLIGRAM(S): 750 SUSPENSION ORAL at 13:16

## 2018-09-23 RX ADMIN — Medication 650 MILLIGRAM(S): at 08:01

## 2018-09-23 RX ADMIN — TACROLIMUS 3 MILLIGRAM(S): 5 CAPSULE ORAL at 20:21

## 2018-09-23 RX ADMIN — MAGNESIUM OXIDE 400 MG ORAL TABLET 400 MILLIGRAM(S): 241.3 TABLET ORAL at 08:01

## 2018-09-23 RX ADMIN — IMIPENEM AND CILASTATIN 100 MILLIGRAM(S): 250; 250 INJECTION, POWDER, FOR SOLUTION INTRAVENOUS at 06:18

## 2018-09-23 RX ADMIN — MAGNESIUM OXIDE 400 MG ORAL TABLET 400 MILLIGRAM(S): 241.3 TABLET ORAL at 17:22

## 2018-09-23 RX ADMIN — Medication 1 TABLET(S): at 13:17

## 2018-09-23 RX ADMIN — FAMOTIDINE 20 MILLIGRAM(S): 10 INJECTION INTRAVENOUS at 13:17

## 2018-09-23 RX ADMIN — SODIUM CHLORIDE 3 MILLILITER(S): 9 INJECTION INTRAMUSCULAR; INTRAVENOUS; SUBCUTANEOUS at 06:19

## 2018-09-23 RX ADMIN — SODIUM CHLORIDE 3 MILLILITER(S): 9 INJECTION INTRAMUSCULAR; INTRAVENOUS; SUBCUTANEOUS at 22:42

## 2018-09-23 RX ADMIN — Medication 125 MILLIGRAM(S): at 06:18

## 2018-09-23 RX ADMIN — IMIPENEM AND CILASTATIN 100 MILLIGRAM(S): 250; 250 INJECTION, POWDER, FOR SOLUTION INTRAVENOUS at 13:15

## 2018-09-23 RX ADMIN — SODIUM CHLORIDE 3 MILLILITER(S): 9 INJECTION INTRAMUSCULAR; INTRAVENOUS; SUBCUTANEOUS at 13:15

## 2018-09-23 RX ADMIN — Medication 125 MILLIGRAM(S): at 17:22

## 2018-09-23 RX ADMIN — Medication 81 MILLIGRAM(S): at 13:16

## 2018-09-23 RX ADMIN — TACROLIMUS 3 MILLIGRAM(S): 5 CAPSULE ORAL at 08:01

## 2018-09-23 RX ADMIN — IMIPENEM AND CILASTATIN 100 MILLIGRAM(S): 250; 250 INJECTION, POWDER, FOR SOLUTION INTRAVENOUS at 22:42

## 2018-09-23 RX ADMIN — Medication 20 MILLIGRAM(S): at 22:42

## 2018-09-23 NOTE — PROGRESS NOTE ADULT - PROBLEM SELECTOR PLAN 1
- Continue Imipenem while inpatient per ID recommendations. Plan will be for total of six months of therapy, which we will transition to Ertapenem at discharge  - Will plan for repeat CT scan in 2 weeks to assess for interval improvement

## 2018-09-23 NOTE — PROGRESS NOTE ADULT - ASSESSMENT
69 y/o male with PMH NICM HFrEF s/p HM2 LVAD 6/2017, who underwent heart transplant on 2/23/18 (CMV D-/R+ and Toxo D-/R+, Hep C+ heart) with post op graft dysfunction who underwent plasmapheresis and IVIG x2 as well as rituximab, with suspected fungal PNA diagnosed 6/2018  treated w voriconazole, recurrent PNA with new RUL infiltrate on CT scan 8/18, which  improved on broad spectrum antibiotics. S/p lung biopsy 8/18. C diff toxin positive -still  on oral vanco,.  Patient presents today for RHC and cardiac biopsy with c/o ongoing diarrhea (3-4 loose BM daily) and mildly productive cough, denies fevers, chills, abd pain, N/V. EKG demonstrated ST at 133bpm. Pt readmitted for further workup.  9/6 one formed BM this am; CMV study's pending, BC pending afebrile, albumin 500 cc for dehydration  potassium supplemented, transition to regular diet.   9/7 VVS; underwent IR procedure of Right lung mass FNA.  Tolerated procedure well.  Culture pending. Vanco IV D/C per ID. Continue with current medication regimen. Send Legionella urine culture and check voriconazole (T) in AM   Disposition: Home once medically cleared   9/8 + fever x1 101.7 as per ID cefepine decreased to 2g bid ivpb  9/9 VSS. 1 liquid/soft BM. Heme consulted for neutropenia possible bone marrow Bx. Deferring Bx at this time until anemia work up complete. Tacro 11.8. 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  Trying to work out antibiotic coverage with insurance  9/20  Hd stable.  Tacro 5.3.  Per HF increase to 2mg in pm.  Insurance approved antbx.  CRUZ sent to facilities.  Awaiting acceptance.    922 tacro 4  3mg am and 3mg pm, offf bactrim  due to hyperkalemia, mepron restarted 67 y/o male with PMH NICM HFrEF s/p HM2 LVAD 6/2017, who underwent heart transplant on 2/23/18 (CMV D-/R+ and Toxo D-/R+, Hep C+ heart) with post op graft dysfunction who underwent plasmapheresis and IVIG x2 as well as rituximab, with suspected fungal PNA diagnosed 6/2018  treated w voriconazole, recurrent PNA with new RUL infiltrate on CT scan 8/18, which  improved on broad spectrum antibiotics. S/p lung biopsy 8/18. C diff toxin positive -still  on oral vanco,.  Patient presents today for RHC and cardiac biopsy with c/o ongoing diarrhea (3-4 loose BM daily) and mildly productive cough, denies fevers, chills, abd pain, N/V. EKG demonstrated ST at 133bpm. Pt readmitted for further workup.  9/6 one formed BM this am; CMV study's pending, BC pending afebrile, albumin 500 cc for dehydration  potassium supplemented, transition to regular diet.   9/7 VVS; underwent IR procedure of Right lung mass FNA.  Tolerated procedure well.  Culture pending. Vanco IV D/C per ID. Continue with current medication regimen. Send Legionella urine culture and check voriconazole (T) in AM   Disposition: Home once medically cleared   9/8 + fever x1 101.7 as per ID cefepine decreased to 2g bid ivpb  9/9 VSS. 1 liquid/soft BM. Heme consulted for neutropenia possible bone marrow Bx. Deferring Bx at this time until anemia work up complete. Tacro 11.8. F/U HF recs  9/10 VSS 1loos liquid BM needs Bone marrow biopsy Tacro11.5  Hycodan for cough  9/11 VSS B/L UE Duplex for  assessment  1. Daily Shower  2. Weight yourself daily and notify any weight gain greater than 2-3 pounds in 24 hours.  3. Regular diet - low fat, low cholesterol, no added salt.  4. Cleanse Midsternal incision and leg incision daily while showering with warm water and mild soap, pat dry and maintain open to air.   5. Follow Cardiac Surgery Do's and Don'ts discharge instructions.   6. No driving until cleared by MD.   7. No heavy lifting nothing greater than 5 pounds until cleared by MD.   8. Call / Notify MD any fever greater than 101.0  9. 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  Trying to work out antibiotic coverage with insurance  9/20  Hd stable.  Tacro 5.3.  Per HF increase to 2mg in pm.  Insurance approved antbx.  CRUZ sent to facilities.  Awaiting acceptance.    9/22 tacro 4  3mg am and 3mg pm, off bactrim  due to hyperkalemia, mepron restarted  9/23 VSS, tacro level 4.7 Tacro dose 3mg BID.

## 2018-09-23 NOTE — PROGRESS NOTE ADULT - SUBJECTIVE AND OBJECTIVE BOX
Subjective: Pt states "I'm ok" denies any CP, SOB, N/V and palpitations. No acute events overnight.     Telemetry:   - 110  Vital Signs Last 24 Hrs  T(C): 36.5 (18 @ 06:26), Max: 36.8 (18 @ 13:30)  T(F): 97.7 (18 @ 06:26), Max: 98.2 (18 @ 13:30)  HR: 101 (18 @ 06:26) (101 - 111)  BP: 116/68 (18 @ 06:26) (102/75 - 116/87)  RR: 18 (18 @ 06:26) (18 - 18)  SpO2: 97% (18 @ 06:26) (96% - 97%)          @ 07:01  -   @ 12:34  --------------------------------------------------------  IN: 120 mL / OUT: 0 mL / NET: 120 mL    Daily Weight in k.5 (23 Sep 2018 07:12)    CAPILLARY BLOOD GLUCOSE  89 - 97      PHYSICAL EXAM  Neurology: A&Ox3, nonfocal, no gross deficits  CV : RRR+S1S2  Lungs: Respirations non-labored, B/L BS CTA  Abdomen: Soft, NT/ND, +BSx4Q, last BM  (-)N/V/D  : Voiding without difficulty  Extremities: B/L LE no edema, negative calf tenderness, +PP    MEDICATIONS  acetaminophen   Tablet .. 650 milliGRAM(s) Oral every 6 hours PRN  aspirin enteric coated 81 milliGRAM(s) Oral daily  atovaquone Suspension 1500 milliGRAM(s) Oral daily  Calcium citrate + vit d3 315mg/250 IU 2 Tablet(s) 2 Tablet(s) Oral two times a day  famotidine    Tablet 20 milliGRAM(s) Oral daily  glucagon  Injectable 1 milliGRAM(s) IntraMuscular once PRN  imipenem/cilastatin  IVPB 500 milliGRAM(s) IV Intermittent every 8 hours  insulin lispro (HumaLOG) corrective regimen sliding scale   SubCutaneous three times a day before meals  insulin lispro (HumaLOG) corrective regimen sliding scale   SubCutaneous at bedtime  magnesium oxide 400 milliGRAM(s) Oral three times a day with meals  multivitamin 1 Tablet(s) Oral daily  pravastatin 20 milliGRAM(s) Oral at bedtime  predniSONE   Tablet 2 milliGRAM(s) Oral <User Schedule>  sodium bicarbonate 650 milliGRAM(s) Oral <User Schedule>  sodium chloride 0.9% lock flush 3 milliLiter(s) IV Push every 8 hours  tacrolimus 3 milliGRAM(s) Oral <User Schedule>  vancomycin    Solution 125 milliGRAM(s) Oral every 12 hours      Physical Therapy Rec:   Home  [  ]   Home w/ PT  [  ]  Rehab  [ X ]    Discussed with Cardiothoracic Team at AM rounds. Subjective: Pt states "I'm ok" denies any CP, SOB, N/V and palpitations. No acute events overnight.     Telemetry:   - 110  Vital Signs Last 24 Hrs  T(C): 36.5 (18 @ 06:26), Max: 36.8 (18 @ 13:30)  T(F): 97.7 (18 @ 06:26), Max: 98.2 (18 @ 13:30)  HR: 101 (18 @ 06:26) (101 - 111)  BP: 116/68 (18 @ 06:26) (102/75 - 116/87)  RR: 18 (18 @ 06:26) (18 - 18)  SpO2: 97% (18 @ 06:26) (96% - 97%)          @ 07:01  -   @ 12:34  --------------------------------------------------------  IN: 120 mL / OUT: 0 mL / NET: 120 mL    Daily Weight in k.5 (23 Sep 2018 07:12)    CAPILLARY BLOOD GLUCOSE  89 - 97      PHYSICAL EXAM  Neurology: A&Ox3, nonfocal, no gross deficits  CV : RRR+S1S2  Lungs: Respirations non-labored, B/L BS CTA  Abdomen: Soft, NT/ND, +BSx4Q, last BM  (-)N/V/D  : Voiding without difficulty  Extremities: B/L LE no edema, negative calf tenderness, +PP    MEDICATIONS  acetaminophen   Tablet .. 650 milliGRAM(s) Oral every 6 hours PRN  aspirin enteric coated 81 milliGRAM(s) Oral daily  atovaquone Suspension 1500 milliGRAM(s) Oral daily  Calcium citrate + vit d3 315mg/250 IU 2 Tablet(s) 2 Tablet(s) Oral two times a day  famotidine    Tablet 20 milliGRAM(s) Oral daily  imipenem/cilastatin  IVPB 500 milliGRAM(s) IV Intermittent every 8 hours  insulin lispro (HumaLOG) corrective regimen sliding scale   SubCutaneous three times a day before meals  insulin lispro (HumaLOG) corrective regimen sliding scale   SubCutaneous at bedtime  magnesium oxide 400 milliGRAM(s) Oral three times a day with meals  multivitamin 1 Tablet(s) Oral daily  pravastatin 20 milliGRAM(s) Oral at bedtime  predniSONE   Tablet 2 milliGRAM(s) Oral <User Schedule>  sodium bicarbonate 650 milliGRAM(s) Oral <User Schedule>  sodium chloride 0.9% lock flush 3 milliLiter(s) IV Push every 8 hours  tacrolimus 3 milliGRAM(s) Oral <User Schedule>  vancomycin    Solution 125 milliGRAM(s) Oral every 12 hours      Physical Therapy Rec:   Home  [  ]   Home w/ PT  [  ]  Rehab  [ X ]    Discussed with Cardiothoracic Team at AM rounds.

## 2018-09-23 NOTE — PROGRESS NOTE ADULT - SUBJECTIVE AND OBJECTIVE BOX
Pt denies CP, SOB. Awaiting rehab.     MEDICATIONS:  aspirin enteric coated 81 milliGRAM(s) Oral daily  atovaquone Suspension 1500 milliGRAM(s) Oral daily  imipenem/cilastatin  IVPB 500 milliGRAM(s) IV Intermittent every 8 hours  vancomycin    Solution 125 milliGRAM(s) Oral every 12 hours  acetaminophen   Tablet .. 650 milliGRAM(s) Oral every 6 hours PRN  acetaminophen   Tablet .. 650 milliGRAM(s) Oral every 6 hours PRN  famotidine    Tablet 20 milliGRAM(s) Oral daily  dextrose 40% Gel 15 Gram(s) Oral once PRN  dextrose 50% Injectable 12.5 Gram(s) IV Push once  dextrose 50% Injectable 25 Gram(s) IV Push once  dextrose 50% Injectable 25 Gram(s) IV Push once  glucagon  Injectable 1 milliGRAM(s) IntraMuscular once PRN  insulin lispro (HumaLOG) corrective regimen sliding scale   SubCutaneous three times a day before meals  insulin lispro (HumaLOG) corrective regimen sliding scale   SubCutaneous at bedtime  pravastatin 20 milliGRAM(s) Oral at bedtime  predniSONE   Tablet 2 milliGRAM(s) Oral <User Schedule>  dextrose 5%. 1000 milliLiter(s) IV Continuous <Continuous>  magnesium oxide 400 milliGRAM(s) Oral three times a day with meals  multivitamin 1 Tablet(s) Oral daily  sodium bicarbonate 650 milliGRAM(s) Oral <User Schedule>  sodium chloride 0.9% lock flush 3 milliLiter(s) IV Push every 8 hours  tacrolimus 3 milliGRAM(s) Oral <User Schedule>    PHYSICAL EXAM:  T(C): 36.5 (09-23-18 @ 06:26), Max: 36.8 (09-22-18 @ 13:30)  HR: 101 (09-23-18 @ 06:26) (101 - 111)  BP: 116/68 (09-23-18 @ 06:26) (102/75 - 116/87)  RR: 18 (09-23-18 @ 06:26) (18 - 18)  SpO2: 97% (09-23-18 @ 06:26) (96% - 97%)  Wt(kg): --  I&O's Summary    22 Sep 2018 07:01  -  23 Sep 2018 07:00  --------------------------------------------------------  IN: 440 mL / OUT: 551 mL / NET: -111 mL    23 Sep 2018 07:01  -  23 Sep 2018 11:55  --------------------------------------------------------  IN: 120 mL / OUT: 0 mL / NET: 120 mL    Appearance: Normal, NAD   HEENT:   Normal oral mucosa, PERRL, EOMI	  Lymphatic: No lymphadenopathy  Cardiovascular: Normal S1 S2, No JVD, No murmurs, No edema  Respiratory: Lungs clear to auscultation	  Psychiatry: A & O x 3, Mood & affect appropriate  Gastrointestinal:  Soft, Non-tender, + BS	  Skin: No rashes, No ecchymoses, No cyanosis	  Neurologic: Non-focal  Extremities: Normal range of motion, No clubbing, cyanosis or edema  Vascular: Peripheral pulses palpable bilaterally        LABS:	 	    CBC Full  -  ( 23 Sep 2018 07:29 )  WBC Count : 4.0 K/uL  Hemoglobin : 8.8 g/dL  Hematocrit : 27.8 %  Platelet Count - Automated : 517 K/uL  Mean Cell Volume : 88.1 fl  Mean Cell Hemoglobin : 27.7 pg  Mean Cell Hemoglobin Concentration : 31.4 gm/dL  Auto Neutrophil # : x  Auto Lymphocyte # : x  Auto Monocyte # : x  Auto Eosinophil # : x  Auto Basophil # : x  Auto Neutrophil % : x  Auto Lymphocyte % : x  Auto Monocyte % : x  Auto Eosinophil % : x  Auto Basophil % : x    09-23    135  |  103  |  14  ----------------------------<  86  5.8<H>   |  20<L>  |  1.36<H>  09-22    135  |  103  |  12  ----------------------------<  98  5.9<H>   |  21<L>  |  1.03    Ca    9.7      23 Sep 2018 07:41  Ca    9.4      22 Sep 2018 07:07  Mg     2.0     09-23

## 2018-09-23 NOTE — PROGRESS NOTE ADULT - PROBLEM SELECTOR PLAN 2
- Tacrolimus level 4.7. Goal 8 to 10.   - Will cont 3/3   - Continue prednisone 2 mg daily.    - Continue off of Cellcept.

## 2018-09-23 NOTE — PROGRESS NOTE ADULT - PROBLEM SELECTOR PLAN 2
s/p 9/7 Right lung FNA   ID following    -Continue IV Imipenem pending sensitivities of nocarida species  -insurance approved infusion and may need SNF placement  - would repeat chest CT imaging after about 2weeks to check for improvement in infiltrates  - off Bactrim due to  hyperkalemia   - check CMV viral load weekly while off ganciclovir  - continue Vanco taper for pst C.diff--decrease to 2x a day x 1 week s/p 9/7 Right lung FNA   ID following    -Continue IV Imipenem x 6 months pending sensitivities of nocardia species   -insurance approved infusion and may need SNF placement  - would repeat chest CT imaging in 2weeks to check for improvement in infiltrates  - off Bactrim due to hyperkalemia   - check CMV viral load weekly while off ganciclovir  - continue PO Vanco BID taper for pst C.diff-- x 1 week

## 2018-09-23 NOTE — PROGRESS NOTE ADULT - PROBLEM SELECTOR PLAN 5
- Restart mepron   - CMV monitoring per protocol weekly (ordered for tomorrow). Last CMV 9/16- not detected

## 2018-09-23 NOTE — PROGRESS NOTE ADULT - PROBLEM SELECTOR PLAN 1
d/w  HF  tac level  4  3mg am and pm   Continue prednisone 2mg daily  sodium bicarb BID.  Continue asa and statin.   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

## 2018-09-24 LAB
ANION GAP SERPL CALC-SCNC: 8 MMOL/L — SIGNIFICANT CHANGE UP (ref 5–17)
BUN SERPL-MCNC: 17 MG/DL — SIGNIFICANT CHANGE UP (ref 7–23)
CALCIUM SERPL-MCNC: 9.7 MG/DL — SIGNIFICANT CHANGE UP (ref 8.4–10.5)
CHLORIDE SERPL-SCNC: 102 MMOL/L — SIGNIFICANT CHANGE UP (ref 96–108)
CMV DNA CSF QL NAA+PROBE: 115 — SIGNIFICANT CHANGE UP
CMV DNA CSF QL NAA+PROBE: SIGNIFICANT CHANGE UP
CMV DNA SPEC NAA+PROBE-LOG#: 2.06 LOGIU/ML — HIGH
CO2 SERPL-SCNC: 21 MMOL/L — LOW (ref 22–31)
CREAT SERPL-MCNC: 1.19 MG/DL — SIGNIFICANT CHANGE UP (ref 0.5–1.3)
GLUCOSE BLDC GLUCOMTR-MCNC: 105 MG/DL — HIGH (ref 70–99)
GLUCOSE BLDC GLUCOMTR-MCNC: 106 MG/DL — HIGH (ref 70–99)
GLUCOSE BLDC GLUCOMTR-MCNC: 84 MG/DL — SIGNIFICANT CHANGE UP (ref 70–99)
GLUCOSE BLDC GLUCOMTR-MCNC: 89 MG/DL — SIGNIFICANT CHANGE UP (ref 70–99)
GLUCOSE SERPL-MCNC: 88 MG/DL — SIGNIFICANT CHANGE UP (ref 70–99)
HCT VFR BLD CALC: 30.3 % — LOW (ref 39–50)
HGB BLD-MCNC: 8.8 G/DL — LOW (ref 13–17)
MCHC RBC-ENTMCNC: 25.6 PG — LOW (ref 27–34)
MCHC RBC-ENTMCNC: 29.2 GM/DL — LOW (ref 32–36)
MCV RBC AUTO: 87.5 FL — SIGNIFICANT CHANGE UP (ref 80–100)
PLATELET # BLD AUTO: 493 K/UL — HIGH (ref 150–400)
POTASSIUM SERPL-MCNC: 5.5 MMOL/L — HIGH (ref 3.5–5.3)
POTASSIUM SERPL-SCNC: 5.5 MMOL/L — HIGH (ref 3.5–5.3)
RBC # BLD: 3.46 M/UL — LOW (ref 4.2–5.8)
RBC # FLD: 20.7 % — HIGH (ref 10.3–14.5)
SODIUM SERPL-SCNC: 131 MMOL/L — LOW (ref 135–145)
TACROLIMUS SERPL-MCNC: 5.6 NG/ML — SIGNIFICANT CHANGE UP
WBC # BLD: 3.9 K/UL — SIGNIFICANT CHANGE UP (ref 3.8–10.5)
WBC # FLD AUTO: 3.9 K/UL — SIGNIFICANT CHANGE UP (ref 3.8–10.5)

## 2018-09-24 PROCEDURE — 99233 SBSQ HOSP IP/OBS HIGH 50: CPT | Mod: GC

## 2018-09-24 PROCEDURE — 99232 SBSQ HOSP IP/OBS MODERATE 35: CPT

## 2018-09-24 RX ORDER — TACROLIMUS 5 MG/1
3 CAPSULE ORAL
Qty: 0 | Refills: 0 | Status: DISCONTINUED | OUTPATIENT
Start: 2018-09-24 | End: 2018-09-26

## 2018-09-24 RX ORDER — TACROLIMUS 5 MG/1
0.5 CAPSULE ORAL ONCE
Qty: 0 | Refills: 0 | Status: COMPLETED | OUTPATIENT
Start: 2018-09-24 | End: 2018-09-24

## 2018-09-24 RX ORDER — TACROLIMUS 5 MG/1
3.5 CAPSULE ORAL
Qty: 0 | Refills: 0 | Status: DISCONTINUED | OUTPATIENT
Start: 2018-09-24 | End: 2018-09-26

## 2018-09-24 RX ADMIN — Medication 1 TABLET(S): at 12:46

## 2018-09-24 RX ADMIN — ATOVAQUONE 1500 MILLIGRAM(S): 750 SUSPENSION ORAL at 12:46

## 2018-09-24 RX ADMIN — Medication 81 MILLIGRAM(S): at 12:46

## 2018-09-24 RX ADMIN — TACROLIMUS 0.5 MILLIGRAM(S): 5 CAPSULE ORAL at 22:08

## 2018-09-24 RX ADMIN — Medication 125 MILLIGRAM(S): at 18:52

## 2018-09-24 RX ADMIN — FAMOTIDINE 20 MILLIGRAM(S): 10 INJECTION INTRAVENOUS at 12:46

## 2018-09-24 RX ADMIN — MAGNESIUM OXIDE 400 MG ORAL TABLET 400 MILLIGRAM(S): 241.3 TABLET ORAL at 18:56

## 2018-09-24 RX ADMIN — IMIPENEM AND CILASTATIN 100 MILLIGRAM(S): 250; 250 INJECTION, POWDER, FOR SOLUTION INTRAVENOUS at 15:16

## 2018-09-24 RX ADMIN — IMIPENEM AND CILASTATIN 100 MILLIGRAM(S): 250; 250 INJECTION, POWDER, FOR SOLUTION INTRAVENOUS at 22:10

## 2018-09-24 RX ADMIN — Medication 650 MILLIGRAM(S): at 20:18

## 2018-09-24 RX ADMIN — SODIUM CHLORIDE 3 MILLILITER(S): 9 INJECTION INTRAMUSCULAR; INTRAVENOUS; SUBCUTANEOUS at 12:43

## 2018-09-24 RX ADMIN — MAGNESIUM OXIDE 400 MG ORAL TABLET 400 MILLIGRAM(S): 241.3 TABLET ORAL at 08:05

## 2018-09-24 RX ADMIN — IMIPENEM AND CILASTATIN 100 MILLIGRAM(S): 250; 250 INJECTION, POWDER, FOR SOLUTION INTRAVENOUS at 06:17

## 2018-09-24 RX ADMIN — Medication 2 MILLIGRAM(S): at 08:05

## 2018-09-24 RX ADMIN — SODIUM CHLORIDE 3 MILLILITER(S): 9 INJECTION INTRAMUSCULAR; INTRAVENOUS; SUBCUTANEOUS at 22:10

## 2018-09-24 RX ADMIN — MAGNESIUM OXIDE 400 MG ORAL TABLET 400 MILLIGRAM(S): 241.3 TABLET ORAL at 12:46

## 2018-09-24 RX ADMIN — Medication 20 MILLIGRAM(S): at 22:08

## 2018-09-24 RX ADMIN — SODIUM CHLORIDE 3 MILLILITER(S): 9 INJECTION INTRAMUSCULAR; INTRAVENOUS; SUBCUTANEOUS at 06:18

## 2018-09-24 RX ADMIN — Medication 125 MILLIGRAM(S): at 06:17

## 2018-09-24 RX ADMIN — Medication 650 MILLIGRAM(S): at 08:05

## 2018-09-24 RX ADMIN — TACROLIMUS 3 MILLIGRAM(S): 5 CAPSULE ORAL at 20:18

## 2018-09-24 RX ADMIN — TACROLIMUS 3 MILLIGRAM(S): 5 CAPSULE ORAL at 08:05

## 2018-09-24 NOTE — PROGRESS NOTE ADULT - ASSESSMENT
69 y/o male with PMH NICM HFrEF s/p HM2 LVAD 6/2017, who underwent heart transplant on 2/23/18 (CMV D-/R+ and Toxo D-/R+, Hep C+ heart) with post op graft dysfunction who underwent plasmapheresis and IVIG x2 as well as rituximab, with suspected fungal PNA diagnosed 6/2018  treated w voriconazole, recurrent PNA with new RUL infiltrate on CT scan 8/18, which  improved on broad spectrum antibiotics. S/p lung biopsy 8/18. C diff toxin positive -still  on oral vanco,.  Patient presents today for RHC and cardiac biopsy with c/o ongoing diarrhea (3-4 loose BM daily) and mildly productive cough, denies fevers, chills, abd pain, N/V. EKG demonstrated ST at 133bpm. Pt readmitted for further workup.  9/6 one formed BM this am; CMV study's pending, BC pending afebrile, albumin 500 cc for dehydration  potassium supplemented, transition to regular diet.   9/7 VVS; underwent IR procedure of Right lung mass FNA.  Tolerated procedure well.  Culture pending. Vanco IV D/C per ID. Continue with current medication regimen. Send Legionella urine culture and check voriconazole (T) in AM   Disposition: Home once medically cleared   9/8 + fever x1 101.7 as per ID cefepine decreased to 2g bid ivpb  9/9 VSS. 1 liquid/soft BM. Heme consulted for neutropenia possible bone marrow Bx. Deferring Bx at this time until anemia work up complete. Tacro 11.8. 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 Trying to work out antibiotic coverage with insurance  9/20  HD stable.  Tacro 5.3.  Per HF increase to 2mg in pm.  Insurance approved antbx.  CRUZ sent to facilities.  Awaiting acceptance.    9/22 tacro 4  3mg am and 3mg pm, off bactrim  due to hyperkalemia, mepron restarted  9/23 VSS, tacro level 4.7 Tacro dose 3mg BID.   9/24 VVS; Continue with current medication regimen.    Plan for discharge to [facility] tomorrow 9/25

## 2018-09-24 NOTE — CHART NOTE - NSCHARTNOTEFT_GEN_A_CORE
Source: Patient [ X]    Family [ ]     other [X ] RN, medical record    Pt seen for nutritional follow up. pt seen reports feeling okay but is eager to go home. Pt denies to speak about diet education or food safety. Pt denies need for written education materials. Pt reports a fair PO intake and denies need to review food preferences     chart reviewed. events noted.  Pt admitted for fever and diarrhea. Per chart, Pt with history of heart transplant and recurrent C-Diff. Per chart. s/p Lung Bx and per ID ? if diarrhea is related to immunosuppression induced.  Pt with Pulmonary nocardiosis. C-Diff+.     Diet : Consistent CHO      Patient reports no GI distress      PO intake:  %   Source for PO intake [ X] Patient      Current Weight: 9/24: 63.9kg, decrease noted. Although ? accuracy as wt on 9/23 was stable at 96.5kg  Admission Wt: 69.8kg,.  % Weight Change    Pertinent Medications: MEDICATIONS  (STANDING):  aspirin enteric coated 81 milliGRAM(s) Oral daily  atovaquone Suspension 1500 milliGRAM(s) Oral daily  Calcium citrate + vit d3 315mg/250 IU 2 Tablet(s) 2 Tablet(s) Oral two times a day  dextrose 5%. 1000 milliLiter(s) (50 mL/Hr) IV Continuous <Continuous>  dextrose 50% Injectable 12.5 Gram(s) IV Push once  dextrose 50% Injectable 25 Gram(s) IV Push once  dextrose 50% Injectable 25 Gram(s) IV Push once  famotidine    Tablet 20 milliGRAM(s) Oral daily  imipenem/cilastatin  IVPB 500 milliGRAM(s) IV Intermittent every 8 hours  insulin lispro (HumaLOG) corrective regimen sliding scale   SubCutaneous three times a day before meals  insulin lispro (HumaLOG) corrective regimen sliding scale   SubCutaneous at bedtime  magnesium oxide 400 milliGRAM(s) Oral three times a day with meals  multivitamin 1 Tablet(s) Oral daily  pravastatin 20 milliGRAM(s) Oral at bedtime  predniSONE   Tablet 2 milliGRAM(s) Oral <User Schedule>  sodium bicarbonate 650 milliGRAM(s) Oral <User Schedule>  sodium chloride 0.9% lock flush 3 milliLiter(s) IV Push every 8 hours  tacrolimus 3 milliGRAM(s) Oral <User Schedule>  vancomycin    Solution 125 milliGRAM(s) Oral every 12 hours    MEDICATIONS  (PRN):  acetaminophen   Tablet .. 650 milliGRAM(s) Oral every 6 hours PRN Temp greater or equal to 38C (100.4F)  acetaminophen   Tablet .. 650 milliGRAM(s) Oral every 6 hours PRN Mild Pain (1 - 3)  dextrose 40% Gel 15 Gram(s) Oral once PRN Blood Glucose LESS THAN 70 milliGRAM(s)/deciliter  glucagon  Injectable 1 milliGRAM(s) IntraMuscular once PRN Glucose LESS THAN 70 milligrams/deciliter    Pertinent Labs:  Hgb/Hct:8.5/30.3-low, Na:131-low, K:5.5-high, Cl:1.19, BUN:88, Cr:9.7, BG levels: 9/23: , 9/24:     Skin: intact     Estimated Needs:   [X ] no change since previous assessment  [ ] recalculated:       Previous Nutrition Diagnosis:    [ X] Increased Nutrient Needs        Nutrition Diagnosis is [ X] ongoing           New Nutrition Diagnosis: [X ] not applicable       Interventions:     Recommend  1. Recommend Pt change diet to Consistent CHO, no concentrated K  2. Provide food preferences as requested by Pt/family within diet restrictions    3. Encourage PO intake during meal times   4. Reviewed menu ordering procedures  5. Trend BG levels, renal indices, LFT's and electrolytes      Monitoring and Evaluation:     [ X] PO intake [ X] Tolerance to diet prescription [ X] weights [X ] follow up per protocol    [ X] other: RD remains available Sarah Siegler RD, [facility] Pager #[phone]

## 2018-09-24 NOTE — PROGRESS NOTE ADULT - PROBLEM SELECTOR PLAN 2
s/p 9/7 Right lung FNA   ID following    Continue IV Imipenem x 6 months pending sensitivities of nocardia species   insurance approved infusion and may need SNF placement  would repeat chest CT imaging in 2weeks to check for improvement in infiltrates  off Bactrim due to hyperkalemia   check CMV viral load weekly while off ganciclovir  continue PO Vanco BID taper for pst C.diff-- x 1 week

## 2018-09-24 NOTE — PROGRESS NOTE ADULT - SUBJECTIVE AND OBJECTIVE BOX
Had one BM yesterday. Denies diarrhea. Denies CP, sOB. Ambulated in hallway yesterday.     MEDICATIONS:  aspirin enteric coated 81 milliGRAM(s) Oral daily  atovaquone Suspension 1500 milliGRAM(s) Oral daily  imipenem/cilastatin  IVPB 500 milliGRAM(s) IV Intermittent every 8 hours  vancomycin    Solution 125 milliGRAM(s) Oral every 12 hours  acetaminophen   Tablet .. 650 milliGRAM(s) Oral every 6 hours PRN  acetaminophen   Tablet .. 650 milliGRAM(s) Oral every 6 hours PRN  famotidine    Tablet 20 milliGRAM(s) Oral daily  dextrose 40% Gel 15 Gram(s) Oral once PRN  dextrose 50% Injectable 12.5 Gram(s) IV Push once  dextrose 50% Injectable 25 Gram(s) IV Push once  dextrose 50% Injectable 25 Gram(s) IV Push once  glucagon  Injectable 1 milliGRAM(s) IntraMuscular once PRN  insulin lispro (HumaLOG) corrective regimen sliding scale   SubCutaneous three times a day before meals  insulin lispro (HumaLOG) corrective regimen sliding scale   SubCutaneous at bedtime  pravastatin 20 milliGRAM(s) Oral at bedtime  predniSONE   Tablet 2 milliGRAM(s) Oral <User Schedule>  dextrose 5%. 1000 milliLiter(s) IV Continuous <Continuous>  magnesium oxide 400 milliGRAM(s) Oral three times a day with meals  multivitamin 1 Tablet(s) Oral daily  sodium bicarbonate 650 milliGRAM(s) Oral <User Schedule>  sodium chloride 0.9% lock flush 3 milliLiter(s) IV Push every 8 hours  tacrolimus 3 milliGRAM(s) Oral <User Schedule>    PHYSICAL EXAM:  T(C): 36.6 (09-24-18 @ 13:17), Max: 36.6 (09-24-18 @ 06:26)  HR: 114 (09-24-18 @ 13:20) (107 - 114)  BP: 118/80 (09-24-18 @ 13:20) (111/76 - 118/80)  RR: 18 (09-24-18 @ 13:17) (18 - 18)  SpO2: 96% (09-24-18 @ 13:20) (96% - 96%)  Wt(kg): --  I&O's Summary    23 Sep 2018 07:01  -  24 Sep 2018 07:00  --------------------------------------------------------  IN: 800 mL / OUT: 901 mL / NET: -101 mL    24 Sep 2018 07:01  -  24 Sep 2018 14:33  --------------------------------------------------------  IN: 440 mL / OUT: 301 mL / NET: 139 mL    Appearance: Normal	  HEENT:   Normal oral mucosa, PERRL, EOMI	  Lymphatic: No lymphadenopathy  Cardiovascular: Normal S1 S2, No JVD, No murmurs, No edema  Respiratory: Lungs clear to auscultation	  Psychiatry: A & O x 3, Mood & affect appropriate  Gastrointestinal:  Soft, Non-tender, + BS	  Skin: No rashes, No ecchymoses, No cyanosis	  Neurologic: Non-focal  Extremities: Normal range of motion, No clubbing, cyanosis or edema  Vascular: Peripheral pulses palpable bilaterally      LABS:	 	    CBC Full  -  ( 24 Sep 2018 07:44 )  WBC Count : 3.9 K/uL  Hemoglobin : 8.8 g/dL  Hematocrit : 30.3 %  Platelet Count - Automated : 493 K/uL  Mean Cell Volume : 87.5 fl  Mean Cell Hemoglobin : 25.6 pg  Mean Cell Hemoglobin Concentration : 29.2 gm/dL  Auto Neutrophil # : x  Auto Lymphocyte # : x  Auto Monocyte # : x  Auto Eosinophil # : x  Auto Basophil # : x  Auto Neutrophil % : x  Auto Lymphocyte % : x  Auto Monocyte % : x  Auto Eosinophil % : x  Auto Basophil % : x    09-24    131<L>  |  102  |  17  ----------------------------<  88  5.5<H>   |  21<L>  |  1.19  09-23    135  |  103  |  14  ----------------------------<  86  5.8<H>   |  20<L>  |  1.36<H>    Ca    9.7      24 Sep 2018 07:44  Ca    9.7      23 Sep 2018 07:41  Mg     2.0     09-23

## 2018-09-24 NOTE — PROGRESS NOTE ADULT - PROBLEM SELECTOR PLAN 2
- Tacrolimus level 5.6. Goal 8 to 10.   - Will increase to 3 in am and 3.5 in evening   - Continue prednisone 2 mg daily.    - Continue off of Cellcept.

## 2018-09-24 NOTE — PROGRESS NOTE ADULT - PROBLEM SELECTOR PLAN 1
Heart Failure following,  Check daily tacro level, continue tacrolimus 3mg BID  Continue prednisone 2mg daily  Continue sodium bicarb BID, mepron 1500 daily, asa and statin.  cmv neg asperg neg   9/11 No candidosis- brain MRI showed no abscess  Disposition: Subacute Rehab [facility] in AM 9/25

## 2018-09-24 NOTE — PROGRESS NOTE ADULT - SUBJECTIVE AND OBJECTIVE BOX
INFECTIOUS DISEASES FOLLOW UP-- Sujey Lujan  [phone]    This is a follow up note for this  68yMale with  History of heart transplant feeling well no cough no fevers/chills      ROS:  CONSTITUTIONAL:  No fever, fair appetite  CARDIOVASCULAR:  No chest pain or palpitations  RESPIRATORY:  No dyspnea  GASTROINTESTINAL:  No nausea, vomiting, diarrhea, or abdominal pain  GENITOURINARY:  No dysuria  NEUROLOGIC:  No headache,     Allergies    No Known Allergies    Intolerances        ANTIBIOTICS/RELEVANT:  antimicrobials  atovaquone Suspension 1500 milliGRAM(s) Oral daily  imipenem/cilastatin  IVPB 500 milliGRAM(s) IV Intermittent every 8 hours  vancomycin    Solution 125 milliGRAM(s) Oral every 12 hours    immunologic:  tacrolimus 3 milliGRAM(s) Oral <User Schedule>    OTHER:  acetaminophen   Tablet .. 650 milliGRAM(s) Oral every 6 hours PRN  acetaminophen   Tablet .. 650 milliGRAM(s) Oral every 6 hours PRN  aspirin enteric coated 81 milliGRAM(s) Oral daily  Calcium citrate + vit d3 315mg/250 IU 2 Tablet(s) 2 Tablet(s) Oral two times a day  dextrose 40% Gel 15 Gram(s) Oral once PRN  dextrose 5%. 1000 milliLiter(s) IV Continuous <Continuous>  dextrose 50% Injectable 12.5 Gram(s) IV Push once  dextrose 50% Injectable 25 Gram(s) IV Push once  dextrose 50% Injectable 25 Gram(s) IV Push once  famotidine    Tablet 20 milliGRAM(s) Oral daily  glucagon  Injectable 1 milliGRAM(s) IntraMuscular once PRN  insulin lispro (HumaLOG) corrective regimen sliding scale   SubCutaneous three times a day before meals  insulin lispro (HumaLOG) corrective regimen sliding scale   SubCutaneous at bedtime  magnesium oxide 400 milliGRAM(s) Oral three times a day with meals  multivitamin 1 Tablet(s) Oral daily  pravastatin 20 milliGRAM(s) Oral at bedtime  predniSONE   Tablet 2 milliGRAM(s) Oral <User Schedule>  sodium bicarbonate 650 milliGRAM(s) Oral <User Schedule>  sodium chloride 0.9% lock flush 3 milliLiter(s) IV Push every 8 hours      Objective:  Vital Signs Last 24 Hrs  T(C): 36.6 (24 Sep 2018 13:17), Max: 36.6 (24 Sep 2018 06:26)  T(F): 97.8 (24 Sep 2018 13:17), Max: 97.8 (24 Sep 2018 06:26)  HR: 114 (24 Sep 2018 13:20) (107 - 114)  BP: 118/80 (24 Sep 2018 13:20) (111/76 - 118/80)  BP(mean): --  RR: 18 (24 Sep 2018 13:17) (18 - 18)  SpO2: 96% (24 Sep 2018 13:20) (96% - 96%)    PHYSICAL EXAM:  Constitutional:no acute distress  Eyes:WALT, EOMI  Ear/Nose/Throat: no oral lesions, 	  Respiratory: clear BL  Cardiovascular: S1S2  Gastrointestinal:soft, (+) BS, no tenderness  Extremities:no e/e/c  No Lymphadenopathy  IV sites not inflammed.    LABS:                        8.8    3.9   )-----------( 493      ( 24 Sep 2018 07:44 )             30.3     09-24    131<L>  |  102  |  17  ----------------------------<  88  5.5<H>   |  21<L>  |  1.19    Ca    9.7      24 Sep 2018 07:44  Mg     2.0     09-23            MICROBIOLOGY:    Hepatitis C Virus RNA Detection by PCR (09.19.18 @ 10:34)    Hepatitis C RNA, Log Value: NotDetec: HCV RNA Quantification by RT-PCR using Renee m2000:  Assay dynamic range:  12 IU/mL to 100,000,000 HCV RNA IU/mL  1.08 (log10) IU/mL to 8.00 (log10) IU/mL  Assay reference range: Not Detected  The results of this test should be interpreted with consideration of all  clinical and laboratory findings. A result of  "No HCV RNA Detected" does  not preclude the possibility of infection with hepatitis C virus.  In  particular, caution should be used when interpreting low level positive  results when the test is used for diagnostic purposes. LogIU/mL    Hepatitis C Virus RNA Detection by PCR: NotDetec IU/mL            RECENT CULTURES:      RADIOLOGY & ADDITIONAL STUDIES:  < from: Xray Chest 1 View- PORTABLE-Routine (09.17.18 @ 05:41) >    IMPRESSION:     Peripheral right lower lobe opacity, unchanged. Please correlate to FNA   dated 9/7/2018.  Right basilar atelectasis.    < end of copied text >

## 2018-09-24 NOTE — PROGRESS NOTE ADULT - ATTENDING COMMENTS
Feels well. Anticipating d/c to rehab although wants to go home. Denies complaints. No more diarrhea or cough. Labs reviewed - tac 5.4 (slight uptrend).  - increase tac to 3/3.5   - continue plan as above

## 2018-09-24 NOTE — PROGRESS NOTE ADULT - PROBLEM SELECTOR PLAN 5
- Restart mepron   - CMV monitoring per protocol weekly (Currently pending from this week). Last CMV 9/16- not detected

## 2018-09-24 NOTE — PROGRESS NOTE ADULT - SUBJECTIVE AND OBJECTIVE BOX
VITAL SIGNS    Subjective:     Telemetry:      Vital Signs Last 24 Hrs  T(C): 36.6 (18 @ 06:26), Max: 36.8 (18 @ 13:34)  T(F): 97.8 (18 @ 06:26), Max: 98.2 (18 @ 13:34)  HR: 107 (18 @ 06:26) (107 - 111)  BP: 117/84 (18 @ 06:26) (108/76 - 117/84)  RR: 18 (18 @ 06:26) (18 - 18)  SpO2: 96% (18 @ 06:26) (96% - 96%)            @ 07:01  -   @ 07:00  --------------------------------------------------------  IN: 800 mL / OUT: 901 mL / NET: -101 mL     @ 07:01  -   @ 12:28  --------------------------------------------------------  IN: 220 mL / OUT: 301 mL / NET: -81 mL    Daily     Daily Weight in k.9 (24 Sep 2018 07:20)    CAPILLARY BLOOD GLUCOSE    POCT Blood Glucose.: 106 mg/dL (24 Sep 2018 11:46)  POCT Blood Glucose.: 89 mg/dL (24 Sep 2018 07:28)  POCT Blood Glucose.: 96 mg/dL (23 Sep 2018 21:47)  POCT Blood Glucose.: 136 mg/dL (23 Sep 2018 16:26)        PHYSICAL EXAM    Neurology: alert and oriented x 3, nonfocal, no gross deficits    CV: (+) S1 and S2, No murmurs, rubs, gallops or clicks     Lungs: CTA B/L     Abdomen: soft, nontender, nondistended, positive bowel sounds, (+) Flatus; (+) BM     :  Voiding               Extremities:  B/L LE (+) edema; negative calf tenderness; (+) 2 DP palpable        acetaminophen Tablet .. 650 milliGRAM(s) Oral every 6 hours PRN  acetaminophen Tablet .. 650 milliGRAM(s) Oral every 6 hours PRN  aspirin enteric coated 81 milliGRAM(s) Oral daily  atovaquone Suspension 1500 milliGRAM(s) Oral daily  Calcium citrate + vit d3 315mg/250 IU 2 Tablet(s) 2 Tablet(s) Oral two times a day  famotidine Tablet 20 milliGRAM(s) Oral daily  glucagon  Injectable 1 milliGRAM(s) IntraMuscular once PRN  imipenem/cilastatin  IVPB 500 milliGRAM(s) IV Intermittent every 8 hours  insulin lispro (HumaLOG) corrective regimen sliding scale   SubCutaneous three times a day before meals  insulin lispro (HumaLOG) corrective regimen sliding scale   SubCutaneous at bedtime  magnesium oxide 400 milliGRAM(s) Oral three times a day with meals  multivitamin 1 Tablet(s) Oral daily  pravastatin 20 milliGRAM(s) Oral at bedtime  predniSONE Tablet 2 milliGRAM(s) Oral <User Schedule>  sodium bicarbonate 650 milliGRAM(s) Oral <User Schedule>  tacrolimus 3 milliGRAM(s) Oral <User Schedule>  vancomycin Solution 125 milliGRAM(s) Oral every 12 hours    Physical Therapy Rec:   Home  [  ]   Home w/ PT  [  ]  Rehab  [ X ]    Discussed with Cardiothoracic Team at AM rounds.

## 2018-09-24 NOTE — PROGRESS NOTE ADULT - ASSESSMENT
67 yo M s/p heart transplant complicated by rejection with rounds of plasmaphoresis , IVIG, rituximab x2, known colonizer with  Pseudomonas was treated for PNA in June 2018  and again in August 14-30 with cefepime and voriconazole as well as c.diff with PO vanco presented this admission with decreased PO intake, 4 watery BM daily and a productive cough. Pt had CT guided R lung mass bx on 8/17 which showed acute inflammation but was negative bacterial or fungal etiology. 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     Suggest:  - Continue IV Imipenem inpatient and at SNF  - would repeat chest CT imaging after about 2weeks to check for improvement in infiltrates  -  Bactrim for both the nocardia and for OI prophylaxis but changed to Mepron?  - check CMV viral load weekly while off ganciclovir->repeat weekly  - continue Vanco taper for C.diff (125 mg orally twice daily for 7 days from 9/20-9/26 , then 125 mg orally once daily for 7 days 9/27-10/3, then 125 mg orally every 2 or 3 days for 8 weeks)    Gary Lujan MD  [phone]  After 5pm/weekends [phone]

## 2018-09-25 LAB
ANION GAP SERPL CALC-SCNC: 10 MMOL/L — SIGNIFICANT CHANGE UP (ref 5–17)
BUN SERPL-MCNC: 24 MG/DL — HIGH (ref 7–23)
CALCIUM SERPL-MCNC: 9.9 MG/DL — SIGNIFICANT CHANGE UP (ref 8.4–10.5)
CHLORIDE SERPL-SCNC: 103 MMOL/L — SIGNIFICANT CHANGE UP (ref 96–108)
CO2 SERPL-SCNC: 24 MMOL/L — SIGNIFICANT CHANGE UP (ref 22–31)
CREAT SERPL-MCNC: 1.21 MG/DL — SIGNIFICANT CHANGE UP (ref 0.5–1.3)
GLUCOSE BLDC GLUCOMTR-MCNC: 100 MG/DL — HIGH (ref 70–99)
GLUCOSE BLDC GLUCOMTR-MCNC: 110 MG/DL — HIGH (ref 70–99)
GLUCOSE BLDC GLUCOMTR-MCNC: 119 MG/DL — HIGH (ref 70–99)
GLUCOSE BLDC GLUCOMTR-MCNC: 85 MG/DL — SIGNIFICANT CHANGE UP (ref 70–99)
GLUCOSE SERPL-MCNC: 99 MG/DL — SIGNIFICANT CHANGE UP (ref 70–99)
HCT VFR BLD CALC: 31.6 % — LOW (ref 39–50)
HGB BLD-MCNC: 9.9 G/DL — LOW (ref 13–17)
MCHC RBC-ENTMCNC: 27.5 PG — SIGNIFICANT CHANGE UP (ref 27–34)
MCHC RBC-ENTMCNC: 31.3 GM/DL — LOW (ref 32–36)
MCV RBC AUTO: 88 FL — SIGNIFICANT CHANGE UP (ref 80–100)
PLATELET # BLD AUTO: 443 K/UL — HIGH (ref 150–400)
POTASSIUM SERPL-MCNC: 4.9 MMOL/L — SIGNIFICANT CHANGE UP (ref 3.5–5.3)
POTASSIUM SERPL-SCNC: 4.9 MMOL/L — SIGNIFICANT CHANGE UP (ref 3.5–5.3)
RBC # BLD: 3.59 M/UL — LOW (ref 4.2–5.8)
RBC # FLD: 20.4 % — HIGH (ref 10.3–14.5)
SODIUM SERPL-SCNC: 137 MMOL/L — SIGNIFICANT CHANGE UP (ref 135–145)
TACROLIMUS SERPL-MCNC: 6.5 NG/ML — SIGNIFICANT CHANGE UP
WBC # BLD: 4.3 K/UL — SIGNIFICANT CHANGE UP (ref 3.8–10.5)
WBC # FLD AUTO: 4.3 K/UL — SIGNIFICANT CHANGE UP (ref 3.8–10.5)

## 2018-09-25 PROCEDURE — 99233 SBSQ HOSP IP/OBS HIGH 50: CPT | Mod: GC

## 2018-09-25 PROCEDURE — 99232 SBSQ HOSP IP/OBS MODERATE 35: CPT

## 2018-09-25 RX ADMIN — MAGNESIUM OXIDE 400 MG ORAL TABLET 400 MILLIGRAM(S): 241.3 TABLET ORAL at 11:48

## 2018-09-25 RX ADMIN — Medication 650 MILLIGRAM(S): at 08:03

## 2018-09-25 RX ADMIN — ATOVAQUONE 1500 MILLIGRAM(S): 750 SUSPENSION ORAL at 11:48

## 2018-09-25 RX ADMIN — SODIUM CHLORIDE 3 MILLILITER(S): 9 INJECTION INTRAMUSCULAR; INTRAVENOUS; SUBCUTANEOUS at 21:35

## 2018-09-25 RX ADMIN — Medication 1 TABLET(S): at 11:48

## 2018-09-25 RX ADMIN — Medication 2 MILLIGRAM(S): at 08:01

## 2018-09-25 RX ADMIN — MAGNESIUM OXIDE 400 MG ORAL TABLET 400 MILLIGRAM(S): 241.3 TABLET ORAL at 20:02

## 2018-09-25 RX ADMIN — TACROLIMUS 3 MILLIGRAM(S): 5 CAPSULE ORAL at 08:02

## 2018-09-25 RX ADMIN — Medication 650 MILLIGRAM(S): at 08:01

## 2018-09-25 RX ADMIN — Medication 650 MILLIGRAM(S): at 08:45

## 2018-09-25 RX ADMIN — IMIPENEM AND CILASTATIN 100 MILLIGRAM(S): 250; 250 INJECTION, POWDER, FOR SOLUTION INTRAVENOUS at 14:35

## 2018-09-25 RX ADMIN — SODIUM CHLORIDE 3 MILLILITER(S): 9 INJECTION INTRAMUSCULAR; INTRAVENOUS; SUBCUTANEOUS at 14:33

## 2018-09-25 RX ADMIN — IMIPENEM AND CILASTATIN 100 MILLIGRAM(S): 250; 250 INJECTION, POWDER, FOR SOLUTION INTRAVENOUS at 06:30

## 2018-09-25 RX ADMIN — FAMOTIDINE 20 MILLIGRAM(S): 10 INJECTION INTRAVENOUS at 11:48

## 2018-09-25 RX ADMIN — Medication 81 MILLIGRAM(S): at 11:48

## 2018-09-25 RX ADMIN — Medication 125 MILLIGRAM(S): at 19:02

## 2018-09-25 RX ADMIN — Medication 20 MILLIGRAM(S): at 21:43

## 2018-09-25 RX ADMIN — Medication 125 MILLIGRAM(S): at 06:30

## 2018-09-25 RX ADMIN — MAGNESIUM OXIDE 400 MG ORAL TABLET 400 MILLIGRAM(S): 241.3 TABLET ORAL at 08:03

## 2018-09-25 RX ADMIN — TACROLIMUS 3.5 MILLIGRAM(S): 5 CAPSULE ORAL at 20:02

## 2018-09-25 RX ADMIN — IMIPENEM AND CILASTATIN 100 MILLIGRAM(S): 250; 250 INJECTION, POWDER, FOR SOLUTION INTRAVENOUS at 21:43

## 2018-09-25 RX ADMIN — Medication 650 MILLIGRAM(S): at 20:02

## 2018-09-25 RX ADMIN — SODIUM CHLORIDE 3 MILLILITER(S): 9 INJECTION INTRAMUSCULAR; INTRAVENOUS; SUBCUTANEOUS at 06:30

## 2018-09-25 NOTE — PROGRESS NOTE ADULT - PROBLEM SELECTOR PLAN 1
Heart Failure following,  Check daily tacro level, continue tacrolimus 3mg BID  Continue prednisone 2mg daily  Continue sodium bicarb BID, mepron 1500 daily, asa and statin.  cmv neg asperg neg   9/11 No candidosis- brain MRI showed no abscess  Disposition: Subacute Rehab [facility] in AM 9/25 Heart Failure following,  Check daily tacro level, continue tacrolimus 3mg AM and 3.5mg PM  Continue prednisone 2mg daily  Continue sodium bicarb BID, mepron 1500 daily, asa and statin.  cmv neg asperg neg   9/11 No candidosis- brain MRI showed no abscess  Disposition: Subacute Rehab [facility] in AM 9/25

## 2018-09-25 NOTE — PROGRESS NOTE ADULT - PROBLEM SELECTOR PLAN 5
- Restart mepron   - CMV monitoring per protocol weekly - 2 samples sent on 9/23 - with one positive result with low titer.   - Will repeat CMV PCR today - on mepron   - CMV monitoring per protocol weekly - 2 samples sent on 9/23 - with one positive result with low titer.   - Will repeat CMV PCR today

## 2018-09-25 NOTE — PROGRESS NOTE ADULT - PROBLEM SELECTOR PLAN 2
- Tacrolimus level 6.5 Goal 8 to 10.   - Will increase to 3 in am and 3.5 in evening   - Continue prednisone 2 mg daily.    - Continue off of Cellcept. - Tacrolimus level 6.5 Goal 8 to 10.   - continue 3 in AM/3.5 in evening  - Continue prednisone 2 mg daily.    - Continue off of Cellcept.

## 2018-09-25 NOTE — PROGRESS NOTE ADULT - ATTENDING COMMENTS
Feels well. Anticipating d/c to rehab tomorrow. Denies complaints. No more diarrhea or cough. Labs reviewed - tac 6.5 (slight uptrend).  - continue 3/3.5   - continue plan as above

## 2018-09-25 NOTE — PROGRESS NOTE ADULT - PROBLEM SELECTOR PLAN 1
- Continue Imipenem while inpatient per ID recommendations. Plan will be for total of six months of therapy, which we will transition to Ertapenem at discharge   - awaiting a bed at [facility]   - Will plan for repeat CT scan in 1 week to assess for interval improvement

## 2018-09-25 NOTE — PROGRESS NOTE ADULT - SUBJECTIVE AND OBJECTIVE BOX
Subjective: Pt states "When am I leaving?" denies any CP, SOB, N/V and palpitations. No acute events overnight.     Telemetry:   - 120  Vital Signs Last 24 Hrs  T(C): 36.4 (18 @ 06:30), Max: 36.4 (18 @ 20:38)  T(F): 97.5 (18 @ 06:30), Max: 97.6 (18 @ 20:38)  HR: 100 (18 @ 06:30) (100 - 114)  BP: 118/84 (18 @ 06:30) (115/76 - 118/84)  RR: 18 (18 @ 06:30) (18 - 18)  SpO2: 98% (18 @ 06:30) (96% - 99%)          @ 07:01  -   @ 13:19  --------------------------------------------------------  IN: 240 mL / OUT: 0 mL / NET: 240 mL    Daily Weight in k.5 (25 Sep 2018 08:00)    CAPILLARY BLOOD GLUCOSE  85 - 110    PHYSICAL EXAM  Neurology: A&Ox3, nonfocal, no gross deficits  CV : RRR+S1S2  Lungs: Respirations non-labored, B/L BS CTA  Abdomen: Soft, NT/ND, +BSx4Q, last BM  (-)N/V/D  : Voiding without difficulty  Extremities: B/L LE no edema, negative calf tenderness, +PP     MEDICATIONS  acetaminophen   Tablet .. 650 milliGRAM(s) Oral every 6 hours PRN  aspirin enteric coated 81 milliGRAM(s) Oral daily  atovaquone Suspension 1500 milliGRAM(s) Oral daily  Calcium citrate + vit d3 315mg/250 IU 2 Tablet(s) 2 Tablet(s) Oral two times a day  famotidine    Tablet 20 milliGRAM(s) Oral daily  imipenem/cilastatin  IVPB 500 milliGRAM(s) IV Intermittent every 8 hours  insulin lispro (HumaLOG) corrective regimen sliding scale   SubCutaneous three times a day before meals  insulin lispro (HumaLOG) corrective regimen sliding scale   SubCutaneous at bedtime  magnesium oxide 400 milliGRAM(s) Oral three times a day with meals  multivitamin 1 Tablet(s) Oral daily  pravastatin 20 milliGRAM(s) Oral at bedtime  predniSONE   Tablet 2 milliGRAM(s) Oral <User Schedule>  sodium bicarbonate 650 milliGRAM(s) Oral <User Schedule>  sodium chloride 0.9% lock flush 3 milliLiter(s) IV Push every 8 hours  tacrolimus 3 milliGRAM(s) Oral <User Schedule>  tacrolimus 3.5 milliGRAM(s) Oral <User Schedule>  vancomycin    Solution 125 milliGRAM(s) Oral every 12 hours      Physical Therapy Rec:   Home  [  ]   Home w/ PT  [  ]  Rehab  [ X ]    Discussed with Cardiothoracic Team at AM rounds.

## 2018-09-25 NOTE — PROGRESS NOTE ADULT - SUBJECTIVE AND OBJECTIVE BOX
INFECTIOUS DISEASES FOLLOW UP-- Sujey Lujan  [phone]    This is a follow up note for this  68yMale with  History of heart transplant admitted with pneumonia-nocardia      ROS:  CONSTITUTIONAL:  No fever, poor appetite  CARDIOVASCULAR:  No chest pain or palpitations  RESPIRATORY:  No dyspnea  GASTROINTESTINAL:  No nausea, vomiting, diarrhea, or abdominal pain  GENITOURINARY:  No dysuria  NEUROLOGIC:  No headache,     Allergies    No Known Allergies    Intolerances        ANTIBIOTICS/RELEVANT:  antimicrobials  atovaquone Suspension 1500 milliGRAM(s) Oral daily  imipenem/cilastatin  IVPB 500 milliGRAM(s) IV Intermittent every 8 hours  vancomycin    Solution 125 milliGRAM(s) Oral every 12 hours    immunologic:  tacrolimus 3 milliGRAM(s) Oral <User Schedule>  tacrolimus 3.5 milliGRAM(s) Oral <User Schedule>    OTHER:  acetaminophen   Tablet .. 650 milliGRAM(s) Oral every 6 hours PRN  acetaminophen   Tablet .. 650 milliGRAM(s) Oral every 6 hours PRN  aspirin enteric coated 81 milliGRAM(s) Oral daily  Calcium citrate + vit d3 315mg/250 IU 2 Tablet(s) 2 Tablet(s) Oral two times a day  dextrose 40% Gel 15 Gram(s) Oral once PRN  dextrose 5%. 1000 milliLiter(s) IV Continuous <Continuous>  dextrose 50% Injectable 12.5 Gram(s) IV Push once  dextrose 50% Injectable 25 Gram(s) IV Push once  dextrose 50% Injectable 25 Gram(s) IV Push once  famotidine    Tablet 20 milliGRAM(s) Oral daily  glucagon  Injectable 1 milliGRAM(s) IntraMuscular once PRN  insulin lispro (HumaLOG) corrective regimen sliding scale   SubCutaneous three times a day before meals  insulin lispro (HumaLOG) corrective regimen sliding scale   SubCutaneous at bedtime  magnesium oxide 400 milliGRAM(s) Oral three times a day with meals  multivitamin 1 Tablet(s) Oral daily  pravastatin 20 milliGRAM(s) Oral at bedtime  predniSONE   Tablet 2 milliGRAM(s) Oral <User Schedule>  sodium bicarbonate 650 milliGRAM(s) Oral <User Schedule>  sodium chloride 0.9% lock flush 3 milliLiter(s) IV Push every 8 hours      Objective:  Vital Signs Last 24 Hrs  T(C): 36.8 (25 Sep 2018 13:39), Max: 36.8 (25 Sep 2018 13:39)  T(F): 98.3 (25 Sep 2018 13:39), Max: 98.3 (25 Sep 2018 13:39)  HR: 107 (25 Sep 2018 13:39) (100 - 111)  BP: 105/73 (25 Sep 2018 13:39) (105/73 - 118/84)  BP(mean): --  RR: 18 (25 Sep 2018 13:39) (18 - 18)  SpO2: 98% (25 Sep 2018 13:39) (98% - 99%)    PHYSICAL EXAM:  Constitutional:no acute distress, thin/frail  Eyes:WALT, EOMI  Ear/Nose/Throat: no oral lesions, 	  Respiratory: clear BL  Cardiovascular: S1S2  Gastrointestinal:soft, (+) BS, no tenderness  Extremities:no e/e/c right middle finger with distal necrosis stable for months  No Lymphadenopathy  IV sites not inflammed.    LABS:                        9.9    4.3   )-----------( 443      ( 25 Sep 2018 09:35 )             31.6     09-25    137  |  103  |  24<H>  ----------------------------<  99  4.9   |  24  |  1.21    Ca    9.9      25 Sep 2018 09:35            MICROBIOLOGY:    Cytomegalovirus By PCR:   115 (09.23.18 @ 19:30)    Hepatitis C RNA, Log Value: NotDetec: HCV RNA Quantification by RT-PCR using Renee m2000:  Assay dynamic range:  12 IU/mL to 100,000,000 HCV RNA IU/mL  1.08 (log10) IU/mL to 8.00 (log10) IU/mL  Assay reference range: Not Detected  The results of this test should be interpreted with consideration of all  clinical and laboratory findings. A result of  "No HCV RNA Detected" does  not preclude the possibility of infection with hepatitis C virus.  In  particular, caution should be used when interpreting low level positive  results when the test is used for diagnostic purposes. LogIU/mL (09.19.18 @ 10:34)            RECENT CULTURES:  Culture - Nocardia (09.07.18 @ 13:51)    Specimen Source: [facility]    Culture Results:   Moderate Rule Out Nocardia species  Sent to  [facility]   for Identification  Susceptibility to follow.        RADIOLOGY & ADDITIONAL STUDIES:    no new studies

## 2018-09-25 NOTE — PROGRESS NOTE ADULT - SUBJECTIVE AND OBJECTIVE BOX
Pt denies CP, SOB. Sitting in chair. Had 2 BMs yesterday.     MEDICATIONS:  aspirin enteric coated 81 milliGRAM(s) Oral daily  atovaquone Suspension 1500 milliGRAM(s) Oral daily  imipenem/cilastatin  IVPB 500 milliGRAM(s) IV Intermittent every 8 hours  vancomycin    Solution 125 milliGRAM(s) Oral every 12 hours  acetaminophen   Tablet .. 650 milliGRAM(s) Oral every 6 hours PRN  acetaminophen   Tablet .. 650 milliGRAM(s) Oral every 6 hours PRN  famotidine    Tablet 20 milliGRAM(s) Oral daily  dextrose 40% Gel 15 Gram(s) Oral once PRN  dextrose 50% Injectable 12.5 Gram(s) IV Push once  dextrose 50% Injectable 25 Gram(s) IV Push once  dextrose 50% Injectable 25 Gram(s) IV Push once  glucagon  Injectable 1 milliGRAM(s) IntraMuscular once PRN  insulin lispro (HumaLOG) corrective regimen sliding scale   SubCutaneous three times a day before meals  insulin lispro (HumaLOG) corrective regimen sliding scale   SubCutaneous at bedtime  pravastatin 20 milliGRAM(s) Oral at bedtime  predniSONE   Tablet 2 milliGRAM(s) Oral <User Schedule>  dextrose 5%. 1000 milliLiter(s) IV Continuous <Continuous>  magnesium oxide 400 milliGRAM(s) Oral three times a day with meals  multivitamin 1 Tablet(s) Oral daily  sodium bicarbonate 650 milliGRAM(s) Oral <User Schedule>  sodium chloride 0.9% lock flush 3 milliLiter(s) IV Push every 8 hours  tacrolimus 3 milliGRAM(s) Oral <User Schedule>  tacrolimus 3.5 milliGRAM(s) Oral <User Schedule>    PHYSICAL EXAM:  T(C): 36.8 (09-25-18 @ 13:39), Max: 36.8 (09-25-18 @ 13:39)  HR: 107 (09-25-18 @ 13:39) (100 - 111)  BP: 105/73 (09-25-18 @ 13:39) (105/73 - 118/84)  RR: 18 (09-25-18 @ 13:39) (18 - 18)  SpO2: 98% (09-25-18 @ 13:39) (98% - 99%)  Wt(kg): --  I&O's Summary    24 Sep 2018 07:01  -  25 Sep 2018 07:00  --------------------------------------------------------  IN: 880 mL / OUT: 1226 mL / NET: -346 mL    25 Sep 2018 07:01  -  25 Sep 2018 13:45  --------------------------------------------------------  IN: 240 mL / OUT: 0 mL / NET: 240 mL      Appearance: Normal	  HEENT:   Normal oral mucosa, PERRL, EOMI	  Lymphatic: No lymphadenopathy  Cardiovascular: Normal S1 S2, No JVD, No murmurs, No edema  Respiratory: Lungs clear to auscultation	  Psychiatry: A & O x 3, Mood & affect appropriate  Gastrointestinal:  Soft, Non-tender, + BS	  Skin: No rashes, No ecchymoses, No cyanosis	  Neurologic: Non-focal  Extremities: Normal range of motion, No clubbing, cyanosis or edema  Vascular: Peripheral pulses palpable bilaterally      LABS:	 	    CBC Full  -  ( 25 Sep 2018 09:35 )  WBC Count : 4.3 K/uL  Hemoglobin : 9.9 g/dL  Hematocrit : 31.6 %  Platelet Count - Automated : 443 K/uL  Mean Cell Volume : 88.0 fl  Mean Cell Hemoglobin : 27.5 pg  Mean Cell Hemoglobin Concentration : 31.3 gm/dL  Auto Neutrophil # : x  Auto Lymphocyte # : x  Auto Monocyte # : x  Auto Eosinophil # : x  Auto Basophil # : x  Auto Neutrophil % : x  Auto Lymphocyte % : x  Auto Monocyte % : x  Auto Eosinophil % : x  Auto Basophil % : x    09-25    137  |  103  |  24<H>  ----------------------------<  99  4.9   |  24  |  1.21  09-24    131<L>  |  102  |  17  ----------------------------<  88  5.5<H>   |  21<L>  |  1.19    Ca    9.9      25 Sep 2018 09:35  Ca    9.7      24 Sep 2018 07:44

## 2018-09-25 NOTE — PROGRESS NOTE ADULT - ASSESSMENT
69 y/o male with PMH NICM HFrEF s/p HM2 LVAD 6/2017, who underwent heart transplant on 2/23/18 (CMV D-/R+ and Toxo D-/R+, Hep C+ heart) with post op graft dysfunction who underwent plasmapheresis and IVIG x2 as well as rituximab, with suspected fungal PNA diagnosed 6/2018  treated w voriconazole, recurrent PNA with new RUL infiltrate on CT scan 8/18, which  improved on broad spectrum antibiotics. S/p lung biopsy 8/18. C diff toxin positive -still  on oral vanco,.  Patient presents today for RHC and cardiac biopsy with c/o ongoing diarrhea (3-4 loose BM daily) and mildly productive cough, denies fevers, chills, abd pain, N/V. EKG demonstrated ST at 133bpm. Pt readmitted for further workup.  9/6 one formed BM this am; CMV study's pending, BC pending afebrile, albumin 500 cc for dehydration  potassium supplemented, transition to regular diet.   9/7 VVS; underwent IR procedure of Right lung mass FNA.  Tolerated procedure well.  Culture pending. Vanco IV D/C per ID. Continue with current medication regimen. Send Legionella urine culture and check voriconazole (T) in AM   Disposition: Home once medically cleared   9/8 + fever x1 101.7 as per ID cefepine decreased to 2g bid ivpb  9/9 VSS. 1 liquid/soft BM. Heme consulted for neutropenia possible bone marrow Bx. Deferring Bx at this time until anemia work up complete. Tacro 11.8. 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 Trying to work out antibiotic coverage with insurance  9/20  HD stable.  Tacro 5.3.  Per HF increase to 2mg in pm.  Insurance approved antbx.  CRUZ sent to facilities.  Awaiting acceptance.    9/22 tacro 4  3mg am and 3mg pm, off bactrim  due to hyperkalemia, mepron restarted  9/23 VSS, tacro level 4.7 Tacro dose 3mg BID.   9/24 VVS; Continue with current medication regimen.    9/25 VSS, Tacro level 6.5. Pending bed availability at [facility] for discharge.

## 2018-09-26 VITALS — WEIGHT: 142.2 LBS

## 2018-09-26 PROBLEM — E78.5 HYPERLIPIDEMIA, UNSPECIFIED: Chronic | Status: ACTIVE | Noted: 2018-09-05

## 2018-09-26 PROBLEM — Z87.891 PERSONAL HISTORY OF NICOTINE DEPENDENCE: Chronic | Status: ACTIVE | Noted: 2018-09-05

## 2018-09-26 LAB
ANION GAP SERPL CALC-SCNC: 8 MMOL/L — SIGNIFICANT CHANGE UP (ref 5–17)
BUN SERPL-MCNC: 25 MG/DL — HIGH (ref 7–23)
CALCIUM SERPL-MCNC: 10.1 MG/DL — SIGNIFICANT CHANGE UP (ref 8.4–10.5)
CHLORIDE SERPL-SCNC: 104 MMOL/L — SIGNIFICANT CHANGE UP (ref 96–108)
CO2 SERPL-SCNC: 25 MMOL/L — SIGNIFICANT CHANGE UP (ref 22–31)
CREAT SERPL-MCNC: 1.16 MG/DL — SIGNIFICANT CHANGE UP (ref 0.5–1.3)
GLUCOSE BLDC GLUCOMTR-MCNC: 91 MG/DL — SIGNIFICANT CHANGE UP (ref 70–99)
GLUCOSE BLDC GLUCOMTR-MCNC: 97 MG/DL — SIGNIFICANT CHANGE UP (ref 70–99)
GLUCOSE SERPL-MCNC: 92 MG/DL — SIGNIFICANT CHANGE UP (ref 70–99)
HCT VFR BLD CALC: 31.6 % — LOW (ref 39–50)
HGB BLD-MCNC: 9.6 G/DL — LOW (ref 13–17)
MAGNESIUM SERPL-MCNC: 2.2 MG/DL — SIGNIFICANT CHANGE UP (ref 1.6–2.6)
MCHC RBC-ENTMCNC: 27.3 PG — SIGNIFICANT CHANGE UP (ref 27–34)
MCHC RBC-ENTMCNC: 30.4 GM/DL — LOW (ref 32–36)
MCV RBC AUTO: 89.7 FL — SIGNIFICANT CHANGE UP (ref 80–100)
PLATELET # BLD AUTO: 430 K/UL — HIGH (ref 150–400)
POTASSIUM SERPL-MCNC: 5.8 MMOL/L — HIGH (ref 3.5–5.3)
POTASSIUM SERPL-SCNC: 5.8 MMOL/L — HIGH (ref 3.5–5.3)
RBC # BLD: 3.52 M/UL — LOW (ref 4.2–5.8)
RBC # FLD: 20.9 % — HIGH (ref 10.3–14.5)
SODIUM SERPL-SCNC: 137 MMOL/L — SIGNIFICANT CHANGE UP (ref 135–145)
TACROLIMUS SERPL-MCNC: 6.9 NG/ML — SIGNIFICANT CHANGE UP
WBC # BLD: 4.8 K/UL — SIGNIFICANT CHANGE UP (ref 3.8–10.5)
WBC # FLD AUTO: 4.8 K/UL — SIGNIFICANT CHANGE UP (ref 3.8–10.5)

## 2018-09-26 PROCEDURE — 99233 SBSQ HOSP IP/OBS HIGH 50: CPT | Mod: GC

## 2018-09-26 RX ORDER — TACROLIMUS 5 MG/1
0.5 CAPSULE ORAL ONCE
Qty: 0 | Refills: 0 | Status: DISCONTINUED | OUTPATIENT
Start: 2018-09-26 | End: 2018-09-26

## 2018-09-26 RX ORDER — ACETAMINOPHEN 500 MG
2 TABLET ORAL
Qty: 0 | Refills: 0 | COMMUNITY
Start: 2018-09-26

## 2018-09-26 RX ORDER — TACROLIMUS 5 MG/1
7 CAPSULE ORAL
Qty: 0 | Refills: 0 | COMMUNITY
Start: 2018-09-26

## 2018-09-26 RX ORDER — TACROLIMUS 5 MG/1
3 CAPSULE ORAL
Qty: 0 | Refills: 0 | COMMUNITY
Start: 2018-09-26

## 2018-09-26 RX ORDER — TACROLIMUS 5 MG/1
0.5 CAPSULE ORAL ONCE
Qty: 0 | Refills: 0 | Status: COMPLETED | OUTPATIENT
Start: 2018-09-26 | End: 2018-09-26

## 2018-09-26 RX ORDER — DOCUSATE SODIUM 100 MG
2 CAPSULE ORAL
Qty: 0 | Refills: 0 | COMMUNITY

## 2018-09-26 RX ORDER — ASPIRIN/CALCIUM CARB/MAGNESIUM 324 MG
1 TABLET ORAL
Qty: 0 | Refills: 0 | COMMUNITY
Start: 2018-09-26

## 2018-09-26 RX ORDER — SODIUM BICARBONATE 1 MEQ/ML
1 SYRINGE (ML) INTRAVENOUS
Qty: 0 | Refills: 0 | COMMUNITY
Start: 2018-09-26

## 2018-09-26 RX ORDER — TACROLIMUS 5 MG/1
4 CAPSULE ORAL
Qty: 0 | Refills: 0 | COMMUNITY
Start: 2018-09-26

## 2018-09-26 RX ORDER — TACROLIMUS 5 MG/1
3.5 CAPSULE ORAL
Qty: 0 | Refills: 0 | Status: DISCONTINUED | OUTPATIENT
Start: 2018-09-26 | End: 2018-09-26

## 2018-09-26 RX ORDER — REPAGLINIDE 1 MG/1
1 TABLET ORAL
Qty: 0 | Refills: 0 | COMMUNITY

## 2018-09-26 RX ORDER — ATOVAQUONE 750 MG/5ML
10 SUSPENSION ORAL
Qty: 0 | Refills: 0 | DISCHARGE
Start: 2018-09-26

## 2018-09-26 RX ORDER — FAMOTIDINE 10 MG/ML
1 INJECTION INTRAVENOUS
Qty: 0 | Refills: 0 | COMMUNITY
Start: 2018-09-26

## 2018-09-26 RX ORDER — FAMOTIDINE 10 MG/ML
1 INJECTION INTRAVENOUS
Qty: 0 | Refills: 0 | DISCHARGE
Start: 2018-09-26

## 2018-09-26 RX ORDER — IMIPENEM AND CILASTATIN 250; 250 MG/100ML; MG/100ML
500 INJECTION, POWDER, FOR SOLUTION INTRAVENOUS
Qty: 0 | Refills: 0 | COMMUNITY
Start: 2018-09-26

## 2018-09-26 RX ORDER — SODIUM POLYSTYRENE SULFONATE 4.1 MEQ/G
1 POWDER, FOR SUSPENSION ORAL
Qty: 0 | Refills: 0 | COMMUNITY

## 2018-09-26 RX ORDER — MAGNESIUM OXIDE 400 MG ORAL TABLET 241.3 MG
1 TABLET ORAL
Qty: 0 | Refills: 0 | COMMUNITY
Start: 2018-09-26

## 2018-09-26 RX ORDER — SODIUM POLYSTYRENE SULFONATE 4.1 MEQ/G
30 POWDER, FOR SUSPENSION ORAL ONCE
Qty: 0 | Refills: 0 | Status: COMPLETED | OUTPATIENT
Start: 2018-09-26 | End: 2018-09-26

## 2018-09-26 RX ADMIN — Medication 81 MILLIGRAM(S): at 12:16

## 2018-09-26 RX ADMIN — SODIUM POLYSTYRENE SULFONATE 30 GRAM(S): 4.1 POWDER, FOR SUSPENSION ORAL at 12:49

## 2018-09-26 RX ADMIN — IMIPENEM AND CILASTATIN 100 MILLIGRAM(S): 250; 250 INJECTION, POWDER, FOR SOLUTION INTRAVENOUS at 05:52

## 2018-09-26 RX ADMIN — FAMOTIDINE 20 MILLIGRAM(S): 10 INJECTION INTRAVENOUS at 12:16

## 2018-09-26 RX ADMIN — Medication 650 MILLIGRAM(S): at 07:42

## 2018-09-26 RX ADMIN — Medication 125 MILLIGRAM(S): at 05:52

## 2018-09-26 RX ADMIN — SODIUM CHLORIDE 3 MILLILITER(S): 9 INJECTION INTRAMUSCULAR; INTRAVENOUS; SUBCUTANEOUS at 12:44

## 2018-09-26 RX ADMIN — SODIUM CHLORIDE 3 MILLILITER(S): 9 INJECTION INTRAMUSCULAR; INTRAVENOUS; SUBCUTANEOUS at 05:41

## 2018-09-26 RX ADMIN — MAGNESIUM OXIDE 400 MG ORAL TABLET 400 MILLIGRAM(S): 241.3 TABLET ORAL at 12:15

## 2018-09-26 RX ADMIN — ATOVAQUONE 1500 MILLIGRAM(S): 750 SUSPENSION ORAL at 12:15

## 2018-09-26 RX ADMIN — Medication 1 TABLET(S): at 12:15

## 2018-09-26 RX ADMIN — TACROLIMUS 3 MILLIGRAM(S): 5 CAPSULE ORAL at 07:41

## 2018-09-26 RX ADMIN — TACROLIMUS 0.5 MILLIGRAM(S): 5 CAPSULE ORAL at 12:15

## 2018-09-26 RX ADMIN — MAGNESIUM OXIDE 400 MG ORAL TABLET 400 MILLIGRAM(S): 241.3 TABLET ORAL at 07:42

## 2018-09-26 RX ADMIN — Medication 2 MILLIGRAM(S): at 07:41

## 2018-09-26 NOTE — PROGRESS NOTE ADULT - SUBJECTIVE AND OBJECTIVE BOX
Learner: Patient  Barriers: None     Patient post cardiac transplant was re-admitted.     Method used: Verbal discussion and written materials    Medication safety was discussed with the patient: allergies, herbals, adherence, interactions, medication precautions, miss dose instructions, purpose, side effects, signs and symptoms to report, and storage & handling    Patient was able to repeat and verbalize key points    Education Summary: Discharge immunosuppressant medications and prophylactic anti-infective agents reviewed with the patient. Outpatient medication schedule was discussed in detail including: medication name, indication, dose, administration times, treatment duration, side effects, drug interactions, and special instructions. Patient questions and concerns were answered and addressed. Patient demonstrated understanding.    Time spent discharge education: 30 minutes    MEDICATIONS  (STANDING):  aspirin enteric coated 81 milliGRAM(s) Oral daily  atovaquone Suspension 1500 milliGRAM(s) Oral daily  Calcium citrate + vit d3 315mg/250 IU 2 Tablet(s) 2 Tablet(s) Oral two times a day  dextrose 5%. 1000 milliLiter(s) (50 mL/Hr) IV Continuous <Continuous>  dextrose 50% Injectable 12.5 Gram(s) IV Push once  dextrose 50% Injectable 25 Gram(s) IV Push once  dextrose 50% Injectable 25 Gram(s) IV Push once  famotidine    Tablet 20 milliGRAM(s) Oral daily  imipenem/cilastatin  IVPB 500 milliGRAM(s) IV Intermittent every 8 hours  insulin lispro (HumaLOG) corrective regimen sliding scale   SubCutaneous three times a day before meals  insulin lispro (HumaLOG) corrective regimen sliding scale   SubCutaneous at bedtime  magnesium oxide 400 milliGRAM(s) Oral three times a day with meals  multivitamin 1 Tablet(s) Oral daily  pravastatin 20 milliGRAM(s) Oral at bedtime  predniSONE   Tablet 2 milliGRAM(s) Oral <User Schedule>  sodium bicarbonate 650 milliGRAM(s) Oral <User Schedule>  sodium chloride 0.9% lock flush 3 milliLiter(s) IV Push every 8 hours  tacrolimus 3.5 milliGRAM(s) Oral <User Schedule>  vancomycin    Solution 125 milliGRAM(s) Oral every 12 hours    MEDICATIONS  (PRN):  acetaminophen   Tablet .. 650 milliGRAM(s) Oral every 6 hours PRN Temp greater or equal to 38C (100.4F)  acetaminophen   Tablet .. 650 milliGRAM(s) Oral every 6 hours PRN Mild Pain (1 - 3)  dextrose 40% Gel 15 Gram(s) Oral once PRN Blood Glucose LESS THAN 70 milliGRAM(s)/deciliter  glucagon  Injectable 1 milliGRAM(s) IntraMuscular once PRN Glucose LESS THAN 70 milligrams/deciliter      Cardiac Transplant Medications:  Tacrolimus adjusted to trough - 3.5 mG PO twice a day   Mycophenolate mofetil - on hold   Prednisone taper - 2 mG PO daily   Atovaquone 1,500 mG PO daily with food   Valganciclovir - on hold for now   Docusate and Senna  Pravastatin 20 mG PO at bedtime   Aspirin EC 81 mG 1 tablet PO daily                           9.6    4.8   )-----------( 430      ( 26 Sep 2018 07:45 )             31.6     09-26    137  |  104  |  25<H>  ----------------------------<  92  5.8<H>   |  25  |  1.16    Ca    10.1      26 Sep 2018 07:45  Mg     2.2     09-26        Tacrolimus (), Serum: 6.9 ng/mL (09-26-18 @ 09:19)  Tacrolimus (), Serum: 6.5 ng/mL (09-25-18 @ 10:59)  Tacrolimus (), Serum: 5.6 ng/mL (09-24-18 @ 09:12)

## 2018-09-26 NOTE — PROGRESS NOTE ADULT - PROBLEM SELECTOR PLAN 1
- Continue Imipenem while inpatient and on discharge as this is preferred tx for nocardia and pt will have sufficient support to take TID IV medication at rehab  - awaiting a bed at [facility]   - Will plan for repeat CT scan in 1 week to assess for interval improvement

## 2018-09-26 NOTE — PROGRESS NOTE ADULT - PROBLEM SELECTOR PROBLEM 7
Hypomagnesemia
Heart transplanted
Hypomagnesemia
Heart transplanted

## 2018-09-26 NOTE — PROGRESS NOTE ADULT - PROBLEM SELECTOR PROBLEM 6
Heart transplanted
Clostridium difficile diarrhea
Heart transplanted
Hypomagnesemia
NORMAN (acute kidney injury)
Clostridium difficile diarrhea

## 2018-09-26 NOTE — PROGRESS NOTE ADULT - ATTENDING COMMENTS
Feels well. Anticipating d/c to rehab today. Denies complaints. No more diarrhea or cough. Labs reviewed - tac 6.9 (slight uptrend).  - increase tacro to 3.5/3.5   - continue plan as above

## 2018-09-26 NOTE — CONSULT NOTE ADULT - CONSULT REQUESTED DATE/TIME
13-Jun-2018 12:25
14-Jun-2018 21:13
15-Kennedy-2018
15-Kennedy-2018 17:31
16-Jun-2018 10:36
19-Jun-2018 14:52
13-Jun-2018 12:07
25-Jun-2018 17:05
Applied

## 2018-09-26 NOTE — PROGRESS NOTE ADULT - PROBLEM SELECTOR PLAN 6
- Continue aspirin 81 mg daily.   - Continue pravastatin 20 mg daily.
- Continue aspirin 81 mg daily.   - Continue pravastatin 20 mg daily.
- Continue magnesium oxide 400 mg PO TID.
- Will slowly wean off of oral vancomycin per ID recommendations. Total of 10 week course with taper.
- consider continuing IVF  - Please replace K as needed for goal K 4-4.5, Mg 2 - 2.5  - Closely monitor BMP
- improved following IVF suggesting pre-renal etiology L  - Please replace K as needed for goal K 4-4.5, Mg 2 - 2.5  - Closely monitor BMP  - Consider increasing sodium bicarb
- Likely in the setting of dehydration  - IVF as above  - Please replace K with additional 40 mEq KCl for goal K 4-4.5, Mg 2-2.5  - Closely monitor BMP  - Consider increasing sodium bicarb
- Will slowly wean off of oral vancomycin per ID recommendations. Total of 10 week course with taper.   -  (125 mg orally once daily for 7 days 9/27-10/3, then 125 mg orally every 2 or 3 days for 8 weeks)

## 2018-09-26 NOTE — PROGRESS NOTE ADULT - PROBLEM SELECTOR PLAN 7
- Continue magnesium oxide 400 mg PO TID.
- Continue aspirin 81 mg daily.   - Continue pravastatin 20 mg daily.
- Continue magnesium oxide 400 mg PO TID.
- Continue aspirin 81 mg daily.   - Continue pravastatin 20 mg daily.

## 2018-09-26 NOTE — PROGRESS NOTE ADULT - PROBLEM SELECTOR PROBLEM 5
Fungal pneumonia
Clostridium difficile diarrhea
At risk for infection due to immunosuppression
Clostridium difficile diarrhea
Fungal pneumonia
Heart transplanted
Fungal pneumonia
At risk for infection due to immunosuppression

## 2018-09-26 NOTE — PROGRESS NOTE ADULT - PROBLEM SELECTOR PROBLEM 3
Clostridium difficile colitis
Opacity of lung on imaging study
Leukopenia, unspecified type
Opacity of lung on imaging study
At risk for infection due to immunosuppression
Clostridium difficile colitis
Hyperkalemia
Leukopenia, unspecified type
Opacity of lung on imaging study
Clostridium difficile colitis
Hyperkalemia
Opacity of lung on imaging study

## 2018-09-26 NOTE — PROGRESS NOTE ADULT - PROBLEM SELECTOR PROBLEM 1
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
Heart transplant, orthotopic, status
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
SIRS (systemic inflammatory response syndrome)
Heart transplant, orthotopic, status
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
Heart transplant, orthotopic, status
Heart transplant, orthotopic, status
SIRS (systemic inflammatory response syndrome)

## 2018-09-26 NOTE — PROGRESS NOTE ADULT - PROBLEM SELECTOR PLAN 2
- Tacrolimus level 6.5 Goal 8 to 10.   - Increase to 3.5 in AM/3.5 in evening (gave extra 0.5 this am)   - Continue prednisone 2 mg daily.    - Continue off of Cellcept.

## 2018-09-26 NOTE — PROGRESS NOTE ADULT - PROBLEM SELECTOR PLAN 5
- on mepron   - CMV monitoring per protocol weekly - 2 samples sent on 9/23 - with one positive result with low titer.   - Will repeat CMV PCR today

## 2018-09-26 NOTE — PROGRESS NOTE ADULT - PROBLEM SELECTOR PROBLEM 2
C. difficile diarrhea
Heart transplant, orthotopic, status
Opacity of lung on imaging study
Immunosuppressed status
C. difficile diarrhea
Heart transplant, orthotopic, status
Immunosuppressed status
Opacity of lung on imaging study
Heart transplant, orthotopic, status
C. difficile diarrhea
Immunosuppressed status

## 2018-09-26 NOTE — PROGRESS NOTE ADULT - ASSESSMENT
Mr. 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 He also has had persistent C. diff diarrhea and continues on therapy with improvement. He has no evidence of graft dysfunction on exam and he is euvolemic. Continues to slowly improve. Cough is resolved. Will continue imipenem while at [facility] per ID.

## 2018-09-26 NOTE — PROGRESS NOTE ADULT - PROVIDER SPECIALTY LIST ADULT
CT Surgery
Gastroenterology
Heart Failure
Infectious Disease
Intervent Radiology
Pharmacy
Transplant Medicine
CT Surgery
Infectious Disease
Infectious Disease
Transplant Medicine
Infectious Disease
CT Surgery
Transplant Medicine
Transplant Medicine

## 2018-09-26 NOTE — PROGRESS NOTE ADULT - SUBJECTIVE AND OBJECTIVE BOX
Date of Admission:    24H hour events:     MEDICATIONS:  aspirin enteric coated 81 milliGRAM(s) Oral daily    atovaquone Suspension 1500 milliGRAM(s) Oral daily  imipenem/cilastatin  IVPB 500 milliGRAM(s) IV Intermittent every 8 hours  vancomycin    Solution 125 milliGRAM(s) Oral every 12 hours      acetaminophen   Tablet .. 650 milliGRAM(s) Oral every 6 hours PRN  acetaminophen   Tablet .. 650 milliGRAM(s) Oral every 6 hours PRN    famotidine    Tablet 20 milliGRAM(s) Oral daily    dextrose 40% Gel 15 Gram(s) Oral once PRN  dextrose 50% Injectable 12.5 Gram(s) IV Push once  dextrose 50% Injectable 25 Gram(s) IV Push once  dextrose 50% Injectable 25 Gram(s) IV Push once  glucagon  Injectable 1 milliGRAM(s) IntraMuscular once PRN  insulin lispro (HumaLOG) corrective regimen sliding scale   SubCutaneous three times a day before meals  insulin lispro (HumaLOG) corrective regimen sliding scale   SubCutaneous at bedtime  pravastatin 20 milliGRAM(s) Oral at bedtime  predniSONE   Tablet 2 milliGRAM(s) Oral <User Schedule>    dextrose 5%. 1000 milliLiter(s) IV Continuous <Continuous>  magnesium oxide 400 milliGRAM(s) Oral three times a day with meals  multivitamin 1 Tablet(s) Oral daily  sodium bicarbonate 650 milliGRAM(s) Oral <User Schedule>  sodium chloride 0.9% lock flush 3 milliLiter(s) IV Push every 8 hours  tacrolimus 0.5 milliGRAM(s) Oral once  tacrolimus 3.5 milliGRAM(s) Oral <User Schedule>      REVIEW OF SYSTEMS:  General: no fatigue/malaise, weight loss/gain.  Skin: no rashes.  Ophthalmologic: no blurred vision, no loss of vision. 	  ENT: no sore throat, rhinorrhea, sinus congestion.  Respiratory: no SOB, cough or wheeze.  Gastrointestinal:  no N/V/D, no melena/hematemesis/hematochezia.  Genitourinary: no dysuria/hesitancy or hematuria.  Musculoskeletal: no myalgias or arthralgias.  Neurological: no changes in vision or hearing, no lightheadedness/dizziness, no syncope/near syncope	  Psychiatric: no unusual stress/anxiety.   Hematology/Lymphatics: no unusual bleeding, bruising and no lymphadenopathy.  Endocrine: no unusual thirst.   All others negative except as stated above and in HPI.    PHYSICAL EXAM:  T(C): 36.6 (09-26-18 @ 05:30), Max: 36.8 (09-25-18 @ 13:39)  HR: 107 (09-26-18 @ 05:30) (107 - 111)  BP: 112/86 (09-26-18 @ 05:30) (105/73 - 116/82)  RR: 18 (09-26-18 @ 05:30) (18 - 18)  SpO2: 96% (09-26-18 @ 05:30) (96% - 99%)  Wt(kg): --  I&O's Summary    25 Sep 2018 07:01  -  26 Sep 2018 07:00  --------------------------------------------------------  IN: 1000 mL / OUT: 450 mL / NET: 550 mL        Appearance: Normal	  HEENT:   Normal oral mucosa, PERRL, EOMI	  Lymphatic: No lymphadenopathy  Cardiovascular: Normal S1 S2, No JVD, No murmurs, No edema  Respiratory: Lungs clear to auscultation	  Psychiatry: A & O x 3, Mood & affect appropriate  Gastrointestinal:  Soft, Non-tender, + BS	  Skin: No rashes, No ecchymoses, No cyanosis	  Neurologic: Non-focal  Extremities: Normal range of motion, No clubbing, cyanosis or edema  Vascular: Peripheral pulses palpable 2+ bilaterally        LABS:	 	    CBC Full  -  ( 26 Sep 2018 07:45 )  WBC Count : 4.8 K/uL  Hemoglobin : 9.6 g/dL  Hematocrit : 31.6 %  Platelet Count - Automated : 430 K/uL  Mean Cell Volume : 89.7 fl  Mean Cell Hemoglobin : 27.3 pg  Mean Cell Hemoglobin Concentration : 30.4 gm/dL  Auto Neutrophil # : x  Auto Lymphocyte # : x  Auto Monocyte # : x  Auto Eosinophil # : x  Auto Basophil # : x  Auto Neutrophil % : x  Auto Lymphocyte % : x  Auto Monocyte % : x  Auto Eosinophil % : x  Auto Basophil % : x    09-26    137  |  104  |  25<H>  ----------------------------<  92  5.8<H>   |  25  |  1.16  09-25    137  |  103  |  24<H>  ----------------------------<  99  4.9   |  24  |  1.21    Ca    10.1      26 Sep 2018 07:45  Ca    9.9      25 Sep 2018 09:35        proBNP:   Lipid Profile:   HgA1c:   TSH:       CARDIAC MARKERS:            TELEMETRY: 	    ECG:  	  RADIOLOGY:  OTHER: 	    PREVIOUS DIAGNOSTIC TESTING:    [ ] Echocardiogram:  [ ]  Catheterization:  [ ] Stress Test:  	  	  ASSESSMENT/PLAN: 	    Rayna Andersen Had 2 BM yesterday. Loose. Denies CP SOB. Ready to go to [facility].     MEDICATIONS:  aspirin enteric coated 81 milliGRAM(s) Oral daily  atovaquone Suspension 1500 milliGRAM(s) Oral daily  imipenem/cilastatin  IVPB 500 milliGRAM(s) IV Intermittent every 8 hours  vancomycin    Solution 125 milliGRAM(s) Oral every 12 hours  acetaminophen   Tablet .. 650 milliGRAM(s) Oral every 6 hours PRN  acetaminophen   Tablet .. 650 milliGRAM(s) Oral every 6 hours PRN  famotidine    Tablet 20 milliGRAM(s) Oral daily  dextrose 40% Gel 15 Gram(s) Oral once PRN  dextrose 50% Injectable 12.5 Gram(s) IV Push once  dextrose 50% Injectable 25 Gram(s) IV Push once  dextrose 50% Injectable 25 Gram(s) IV Push once  glucagon  Injectable 1 milliGRAM(s) IntraMuscular once PRN  insulin lispro (HumaLOG) corrective regimen sliding scale   SubCutaneous three times a day before meals  insulin lispro (HumaLOG) corrective regimen sliding scale   SubCutaneous at bedtime  pravastatin 20 milliGRAM(s) Oral at bedtime  predniSONE   Tablet 2 milliGRAM(s) Oral <User Schedule>  dextrose 5%. 1000 milliLiter(s) IV Continuous <Continuous>  magnesium oxide 400 milliGRAM(s) Oral three times a day with meals  multivitamin 1 Tablet(s) Oral daily  sodium bicarbonate 650 milliGRAM(s) Oral <User Schedule>  sodium chloride 0.9% lock flush 3 milliLiter(s) IV Push every 8 hours  tacrolimus 0.5 milliGRAM(s) Oral once  tacrolimus 3.5 milliGRAM(s) Oral <User Schedule>    PHYSICAL EXAM:  T(C): 36.6 (09-26-18 @ 05:30), Max: 36.8 (09-25-18 @ 13:39)  HR: 107 (09-26-18 @ 05:30) (107 - 111)  BP: 112/86 (09-26-18 @ 05:30) (105/73 - 116/82)  RR: 18 (09-26-18 @ 05:30) (18 - 18)  SpO2: 96% (09-26-18 @ 05:30) (96% - 99%)  Wt(kg): --  I&O's Summary    25 Sep 2018 07:01  -  26 Sep 2018 07:00  --------------------------------------------------------  IN: 1000 mL / OUT: 450 mL / NET: 550 mL    Appearance: Normal, NAD   HEENT:   Normal oral mucosa, PERRL, EOMI	  Lymphatic: No lymphadenopathy  Cardiovascular: Normal S1 S2, No JVD, No murmurs, No edema  Respiratory: Lungs clear to auscultation	  Psychiatry: A & O x 3, Mood & affect appropriate  Gastrointestinal:  Soft, Non-tender, + BS	  Skin: No rashes, No ecchymoses, No cyanosis	  Neurologic: Non-focal  Extremities: Normal range of motion, No clubbing, cyanosis or edema  Vascular: Peripheral pulses palpable bilaterally        LABS:	 	    CBC Full  -  ( 26 Sep 2018 07:45 )  WBC Count : 4.8 K/uL  Hemoglobin : 9.6 g/dL  Hematocrit : 31.6 %  Platelet Count - Automated : 430 K/uL  Mean Cell Volume : 89.7 fl  Mean Cell Hemoglobin : 27.3 pg  Mean Cell Hemoglobin Concentration : 30.4 gm/dL  Auto Neutrophil # : x  Auto Lymphocyte # : x  Auto Monocyte # : x  Auto Eosinophil # : x  Auto Basophil # : x  Auto Neutrophil % : x  Auto Lymphocyte % : x  Auto Monocyte % : x  Auto Eosinophil % : x  Auto Basophil % : x    09-26    137  |  104  |  25<H>  ----------------------------<  92  5.8<H>   |  25  |  1.16  09-25    137  |  103  |  24<H>  ----------------------------<  99  4.9   |  24  |  1.21    Ca    10.1      26 Sep 2018 07:45  Ca    9.9      25 Sep 2018 09:35

## 2018-09-26 NOTE — PROGRESS NOTE ADULT - PROBLEM SELECTOR PROBLEM 4
Anemia
At risk for infection due to immunosuppression
Anemia
At risk for infection due to immunosuppression
Clostridium difficile diarrhea
Leukopenia, unspecified type
Anemia
Leukopenia, unspecified type

## 2018-09-27 LAB
CMV DNA CSF QL NAA+PROBE: 79 — SIGNIFICANT CHANGE UP
CMV DNA SPEC NAA+PROBE-LOG#: 1.9 LOGIU/ML — HIGH

## 2018-09-27 RX ORDER — VANCOMYCIN HCL 1 G
1 VIAL (EA) INTRAVENOUS
Qty: 0 | Refills: 0 | COMMUNITY
Start: 2018-09-27 | End: 2018-10-03

## 2018-09-27 RX ORDER — VANCOMYCIN HCL 1 G
1 VIAL (EA) INTRAVENOUS
Qty: 30 | Refills: 0 | OUTPATIENT
Start: 2018-09-27 | End: 2018-10-26

## 2018-09-28 ENCOUNTER — OTHER (OUTPATIENT)
Age: 68
End: 2018-09-28

## 2018-10-03 LAB
CULTURE RESULTS: SIGNIFICANT CHANGE UP
SPECIMEN SOURCE: SIGNIFICANT CHANGE UP

## 2018-10-04 RX ORDER — VANCOMYCIN HCL 1 G
1 VIAL (EA) INTRAVENOUS
Qty: 0 | Refills: 0 | COMMUNITY
Start: 2018-10-04 | End: 2018-11-29

## 2018-10-09 LAB
CULTURE RESULTS: SIGNIFICANT CHANGE UP
SPECIMEN SOURCE: SIGNIFICANT CHANGE UP

## 2018-10-16 ENCOUNTER — APPOINTMENT (OUTPATIENT)
Dept: ENDOCRINOLOGY | Facility: CLINIC | Age: 68
End: 2018-10-16

## 2018-10-16 NOTE — PHYSICAL THERAPY INITIAL EVALUATION ADULT - DIAGNOSIS, PT EVAL
decreased functional mobility secondary to generalized weakness, impaired balance, decreased endurance
Spouse

## 2018-10-18 ENCOUNTER — APPOINTMENT (OUTPATIENT)
Dept: INFECTIOUS DISEASE | Facility: HOSPITAL | Age: 68
End: 2018-10-18
Payer: MEDICARE

## 2018-10-18 ENCOUNTER — NON-APPOINTMENT (OUTPATIENT)
Age: 68
End: 2018-10-18

## 2018-10-18 ENCOUNTER — APPOINTMENT (OUTPATIENT)
Dept: CARDIOLOGY | Facility: CLINIC | Age: 68
End: 2018-10-18
Payer: COMMERCIAL

## 2018-10-18 ENCOUNTER — OUTPATIENT (OUTPATIENT)
Dept: OUTPATIENT SERVICES | Facility: HOSPITAL | Age: 68
LOS: 1 days | End: 2018-10-18
Payer: MEDICARE

## 2018-10-18 VITALS
TEMPERATURE: 36.5 F | HEIGHT: 68 IN | BODY MASS INDEX: 22.58 KG/M2 | SYSTOLIC BLOOD PRESSURE: 137 MMHG | DIASTOLIC BLOOD PRESSURE: 97 MMHG | WEIGHT: 149 LBS | OXYGEN SATURATION: 100 % | HEART RATE: 118 BPM

## 2018-10-18 DIAGNOSIS — B49 UNSPECIFIED MYCOSIS: ICD-10-CM

## 2018-10-18 DIAGNOSIS — Z98.890 OTHER SPECIFIED POSTPROCEDURAL STATES: Chronic | ICD-10-CM

## 2018-10-18 DIAGNOSIS — Z94.1 HEART TRANSPLANT STATUS: Chronic | ICD-10-CM

## 2018-10-18 PROCEDURE — 99214 OFFICE O/P EST MOD 30 MIN: CPT

## 2018-10-18 PROCEDURE — 71250 CT THORAX DX C-: CPT | Mod: 26

## 2018-10-18 PROCEDURE — 71250 CT THORAX DX C-: CPT

## 2018-10-18 PROCEDURE — 99215 OFFICE O/P EST HI 40 MIN: CPT

## 2018-10-18 PROCEDURE — 93000 ELECTROCARDIOGRAM COMPLETE: CPT

## 2018-10-18 RX ORDER — VORICONAZOLE 200 MG/1
200 TABLET ORAL
Qty: 60 | Refills: 1 | Status: COMPLETED | COMMUNITY
Start: 2018-06-28 | End: 2018-10-18

## 2018-10-18 RX ORDER — REPAGLINIDE 0.5 MG/1
0.5 TABLET ORAL 3 TIMES DAILY
Qty: 90 | Refills: 3 | Status: COMPLETED | COMMUNITY
Start: 2018-07-30 | End: 2018-10-18

## 2018-10-18 RX ORDER — BLOOD SUGAR DIAGNOSTIC
STRIP MISCELLANEOUS
Qty: 100 | Refills: 5 | Status: COMPLETED | COMMUNITY
Start: 2018-07-03 | End: 2018-10-18

## 2018-10-24 PROCEDURE — 86901 BLOOD TYPING SEROLOGIC RH(D): CPT

## 2018-10-24 PROCEDURE — 80285 DRUG ASSAY VORICONAZOLE: CPT

## 2018-10-24 PROCEDURE — 87633 RESP VIRUS 12-25 TARGETS: CPT

## 2018-10-24 PROCEDURE — 36569 INSJ PICC 5 YR+ W/O IMAGING: CPT

## 2018-10-24 PROCEDURE — 88312 SPECIAL STAINS GROUP 1: CPT

## 2018-10-24 PROCEDURE — 87486 CHLMYD PNEUM DNA AMP PROBE: CPT

## 2018-10-24 PROCEDURE — 93005 ELECTROCARDIOGRAM TRACING: CPT

## 2018-10-24 PROCEDURE — 82746 ASSAY OF FOLIC ACID SERUM: CPT

## 2018-10-24 PROCEDURE — 87116 MYCOBACTERIA CULTURE: CPT

## 2018-10-24 PROCEDURE — 97162 PT EVAL MOD COMPLEX 30 MIN: CPT

## 2018-10-24 PROCEDURE — 83615 LACTATE (LD) (LDH) ENZYME: CPT

## 2018-10-24 PROCEDURE — 97110 THERAPEUTIC EXERCISES: CPT

## 2018-10-24 PROCEDURE — 83550 IRON BINDING TEST: CPT

## 2018-10-24 PROCEDURE — 87581 M.PNEUMON DNA AMP PROBE: CPT

## 2018-10-24 PROCEDURE — 86850 RBC ANTIBODY SCREEN: CPT

## 2018-10-24 PROCEDURE — C1751: CPT

## 2018-10-24 PROCEDURE — 99153 MOD SED SAME PHYS/QHP EA: CPT

## 2018-10-24 PROCEDURE — 93505 ENDOMYOCARDIAL BIOPSY: CPT

## 2018-10-24 PROCEDURE — 85730 THROMBOPLASTIN TIME PARTIAL: CPT

## 2018-10-24 PROCEDURE — P9045: CPT

## 2018-10-24 PROCEDURE — 82272 OCCULT BLD FECES 1-3 TESTS: CPT

## 2018-10-24 PROCEDURE — 88112 CYTOPATH CELL ENHANCE TECH: CPT

## 2018-10-24 PROCEDURE — 84132 ASSAY OF SERUM POTASSIUM: CPT

## 2018-10-24 PROCEDURE — 87206 SMEAR FLUORESCENT/ACID STAI: CPT

## 2018-10-24 PROCEDURE — 93970 EXTREMITY STUDY: CPT

## 2018-10-24 PROCEDURE — 88342 IMHCHEM/IMCYTCHM 1ST ANTB: CPT

## 2018-10-24 PROCEDURE — 97116 GAIT TRAINING THERAPY: CPT

## 2018-10-24 PROCEDURE — 87106 FUNGI IDENTIFICATION YEAST: CPT

## 2018-10-24 PROCEDURE — 85027 COMPLETE CBC AUTOMATED: CPT

## 2018-10-24 PROCEDURE — 82728 ASSAY OF FERRITIN: CPT

## 2018-10-24 PROCEDURE — 87205 SMEAR GRAM STAIN: CPT

## 2018-10-24 PROCEDURE — A9585: CPT

## 2018-10-24 PROCEDURE — 87040 BLOOD CULTURE FOR BACTERIA: CPT

## 2018-10-24 PROCEDURE — 32405: CPT

## 2018-10-24 PROCEDURE — 83010 ASSAY OF HAPTOGLOBIN QUANT: CPT

## 2018-10-24 PROCEDURE — C1894: CPT

## 2018-10-24 PROCEDURE — 87070 CULTURE OTHR SPECIMN AEROBIC: CPT

## 2018-10-24 PROCEDURE — 74176 CT ABD & PELVIS W/O CONTRAST: CPT

## 2018-10-24 PROCEDURE — 76937 US GUIDE VASCULAR ACCESS: CPT

## 2018-10-24 PROCEDURE — P9011: CPT

## 2018-10-24 PROCEDURE — 71045 X-RAY EXAM CHEST 1 VIEW: CPT

## 2018-10-24 PROCEDURE — 84466 ASSAY OF TRANSFERRIN: CPT

## 2018-10-24 PROCEDURE — C1817: CPT

## 2018-10-24 PROCEDURE — 82962 GLUCOSE BLOOD TEST: CPT

## 2018-10-24 PROCEDURE — 36584 COMPL RPLCMT PICC RS&I: CPT

## 2018-10-24 PROCEDURE — 97530 THERAPEUTIC ACTIVITIES: CPT

## 2018-10-24 PROCEDURE — 87449 NOS EACH ORGANISM AG IA: CPT

## 2018-10-24 PROCEDURE — 83605 ASSAY OF LACTIC ACID: CPT

## 2018-10-24 PROCEDURE — 86923 COMPATIBILITY TEST ELECTRIC: CPT

## 2018-10-24 PROCEDURE — 84100 ASSAY OF PHOSPHORUS: CPT

## 2018-10-24 PROCEDURE — 85045 AUTOMATED RETICULOCYTE COUNT: CPT

## 2018-10-24 PROCEDURE — 93306 TTE W/DOPPLER COMPLETE: CPT

## 2018-10-24 PROCEDURE — 36430 TRANSFUSION BLD/BLD COMPNT: CPT

## 2018-10-24 PROCEDURE — 87102 FUNGUS ISOLATION CULTURE: CPT

## 2018-10-24 PROCEDURE — 83036 HEMOGLOBIN GLYCOSYLATED A1C: CPT

## 2018-10-24 PROCEDURE — 82607 VITAMIN B-12: CPT

## 2018-10-24 PROCEDURE — 99152 MOD SED SAME PHYS/QHP 5/>YRS: CPT

## 2018-10-24 PROCEDURE — 70553 MRI BRAIN STEM W/O & W/DYE: CPT

## 2018-10-24 PROCEDURE — 87798 DETECT AGENT NOS DNA AMP: CPT

## 2018-10-24 PROCEDURE — 71250 CT THORAX DX C-: CPT

## 2018-10-24 PROCEDURE — 83735 ASSAY OF MAGNESIUM: CPT

## 2018-10-24 PROCEDURE — 80048 BASIC METABOLIC PNL TOTAL CA: CPT

## 2018-10-24 PROCEDURE — C1884: CPT

## 2018-10-24 PROCEDURE — 84550 ASSAY OF BLOOD/URIC ACID: CPT

## 2018-10-24 PROCEDURE — 71046 X-RAY EXAM CHEST 2 VIEWS: CPT

## 2018-10-24 PROCEDURE — 87075 CULTR BACTERIA EXCEPT BLOOD: CPT

## 2018-10-24 PROCEDURE — 87522 HEPATITIS C REVRS TRNSCRPJ: CPT

## 2018-10-24 PROCEDURE — 88346 IMFLUOR 1ST 1ANTB STAIN PX: CPT

## 2018-10-24 PROCEDURE — 82247 BILIRUBIN TOTAL: CPT

## 2018-10-24 PROCEDURE — 77012 CT SCAN FOR NEEDLE BIOPSY: CPT

## 2018-10-24 PROCEDURE — 87015 SPECIMEN INFECT AGNT CONCNTJ: CPT

## 2018-10-24 PROCEDURE — 88305 TISSUE EXAM BY PATHOLOGIST: CPT

## 2018-10-24 PROCEDURE — 80053 COMPREHEN METABOLIC PANEL: CPT

## 2018-10-24 PROCEDURE — 86900 BLOOD TYPING SEROLOGIC ABO: CPT

## 2018-10-24 PROCEDURE — C1769: CPT

## 2018-10-24 PROCEDURE — 85610 PROTHROMBIN TIME: CPT

## 2018-10-24 PROCEDURE — 80197 ASSAY OF TACROLIMUS: CPT

## 2018-10-24 PROCEDURE — 77001 FLUOROGUIDE FOR VEIN DEVICE: CPT

## 2018-10-24 PROCEDURE — 88307 TISSUE EXAM BY PATHOLOGIST: CPT

## 2018-10-27 LAB
CULTURE RESULTS: SIGNIFICANT CHANGE UP
SPECIMEN SOURCE: SIGNIFICANT CHANGE UP

## 2018-10-31 RX ORDER — PREDNISONE 1 MG/1
1 TABLET ORAL DAILY
Refills: 0 | Status: COMPLETED | COMMUNITY
Start: 2018-08-28 | End: 2018-10-31

## 2018-10-31 RX ORDER — ATOVAQUONE 750 MG/5ML
750 SUSPENSION ORAL DAILY
Refills: 0 | Status: COMPLETED | COMMUNITY
Start: 2018-04-12 | End: 2018-10-31

## 2018-10-31 RX ORDER — VANCOMYCIN HYDROCHLORIDE 50 MG/ML
50 KIT ORAL
Refills: 0 | Status: COMPLETED | COMMUNITY
Start: 2018-10-18 | End: 2018-10-31

## 2018-11-02 RX ORDER — IMIPENEM AND CILASTATIN SODIUM 500; 500 MG/20ML; MG/20ML
500 INJECTION, POWDER, FOR SOLUTION INTRAVENOUS EVERY 8 HOURS
Refills: 0 | Status: COMPLETED | COMMUNITY
Start: 2018-10-18 | End: 2018-11-02

## 2018-11-07 ENCOUNTER — OUTPATIENT (OUTPATIENT)
Dept: OUTPATIENT SERVICES | Facility: HOSPITAL | Age: 68
LOS: 1 days | End: 2018-11-07
Payer: MEDICARE

## 2018-11-07 ENCOUNTER — APPOINTMENT (OUTPATIENT)
Dept: CARDIOLOGY | Facility: CLINIC | Age: 68
End: 2018-11-07
Payer: MEDICARE

## 2018-11-07 ENCOUNTER — APPOINTMENT (OUTPATIENT)
Dept: CV DIAGNOSITCS | Facility: HOSPITAL | Age: 68
End: 2018-11-07

## 2018-11-07 VITALS
OXYGEN SATURATION: 98 % | BODY MASS INDEX: 21.52 KG/M2 | TEMPERATURE: 98 F | DIASTOLIC BLOOD PRESSURE: 78 MMHG | SYSTOLIC BLOOD PRESSURE: 139 MMHG | RESPIRATION RATE: 20 BRPM | HEART RATE: 114 BPM | HEIGHT: 68 IN | WEIGHT: 142 LBS

## 2018-11-07 VITALS
HEART RATE: 16 BPM | WEIGHT: 141.98 LBS | SYSTOLIC BLOOD PRESSURE: 139 MMHG | RESPIRATION RATE: 16 BRPM | HEIGHT: 68 IN | DIASTOLIC BLOOD PRESSURE: 78 MMHG | TEMPERATURE: 98 F | OXYGEN SATURATION: 98 %

## 2018-11-07 DIAGNOSIS — Z94.1 HEART TRANSPLANT STATUS: ICD-10-CM

## 2018-11-07 DIAGNOSIS — A04.72 ENTEROCOLITIS DUE TO CLOSTRIDIUM DIFFICILE, NOT SPECIFIED AS RECURRENT: ICD-10-CM

## 2018-11-07 DIAGNOSIS — Z98.890 OTHER SPECIFIED POSTPROCEDURAL STATES: Chronic | ICD-10-CM

## 2018-11-07 DIAGNOSIS — Z94.1 HEART TRANSPLANT STATUS: Chronic | ICD-10-CM

## 2018-11-07 LAB
ALBUMIN SERPL ELPH-MCNC: 4.3 G/DL — SIGNIFICANT CHANGE UP (ref 3.3–5)
ALBUMIN SERPL ELPH-MCNC: 4.3 G/DL — SIGNIFICANT CHANGE UP (ref 3.3–5)
ALBUMIN SERPL ELPH-MCNC: 4.4 G/DL — SIGNIFICANT CHANGE UP (ref 3.3–5)
ALP SERPL-CCNC: 91 U/L — SIGNIFICANT CHANGE UP (ref 40–120)
ALP SERPL-CCNC: 92 U/L — SIGNIFICANT CHANGE UP (ref 40–120)
ALP SERPL-CCNC: 95 U/L — SIGNIFICANT CHANGE UP (ref 40–120)
ALT FLD-CCNC: 13 U/L — SIGNIFICANT CHANGE UP (ref 10–45)
ALT FLD-CCNC: 15 U/L — SIGNIFICANT CHANGE UP (ref 10–45)
ALT FLD-CCNC: 15 U/L — SIGNIFICANT CHANGE UP (ref 10–45)
ANION GAP SERPL CALC-SCNC: 10 MMOL/L — SIGNIFICANT CHANGE UP (ref 5–17)
ANION GAP SERPL CALC-SCNC: 11 MMOL/L — SIGNIFICANT CHANGE UP (ref 5–17)
ANION GAP SERPL CALC-SCNC: 14 MMOL/L — SIGNIFICANT CHANGE UP (ref 5–17)
APTT BLD: 32.5 SEC — SIGNIFICANT CHANGE UP (ref 27.5–36.3)
AST SERPL-CCNC: 27 U/L — SIGNIFICANT CHANGE UP (ref 10–40)
AST SERPL-CCNC: 29 U/L — SIGNIFICANT CHANGE UP (ref 10–40)
AST SERPL-CCNC: 32 U/L — SIGNIFICANT CHANGE UP (ref 10–40)
BASOPHILS # BLD AUTO: 0 K/UL — SIGNIFICANT CHANGE UP (ref 0–0.2)
BASOPHILS NFR BLD AUTO: 0.7 % — SIGNIFICANT CHANGE UP (ref 0–2)
BILIRUB SERPL-MCNC: 0.8 MG/DL — SIGNIFICANT CHANGE UP (ref 0.2–1.2)
BILIRUB SERPL-MCNC: 0.8 MG/DL — SIGNIFICANT CHANGE UP (ref 0.2–1.2)
BILIRUB SERPL-MCNC: 0.9 MG/DL — SIGNIFICANT CHANGE UP (ref 0.2–1.2)
BLD GP AB SCN SERPL QL: NEGATIVE — SIGNIFICANT CHANGE UP
BUN SERPL-MCNC: 41 MG/DL — HIGH (ref 7–23)
BUN SERPL-MCNC: 41 MG/DL — HIGH (ref 7–23)
BUN SERPL-MCNC: 44 MG/DL — HIGH (ref 7–23)
CALCIUM SERPL-MCNC: 10.1 MG/DL — SIGNIFICANT CHANGE UP (ref 8.4–10.5)
CALCIUM SERPL-MCNC: 10.2 MG/DL — SIGNIFICANT CHANGE UP (ref 8.4–10.5)
CALCIUM SERPL-MCNC: 10.3 MG/DL — SIGNIFICANT CHANGE UP (ref 8.4–10.5)
CHLORIDE SERPL-SCNC: 103 MMOL/L — SIGNIFICANT CHANGE UP (ref 96–108)
CHLORIDE SERPL-SCNC: 106 MMOL/L — SIGNIFICANT CHANGE UP (ref 96–108)
CHLORIDE SERPL-SCNC: 109 MMOL/L — HIGH (ref 96–108)
CO2 SERPL-SCNC: 18 MMOL/L — LOW (ref 22–31)
CO2 SERPL-SCNC: 20 MMOL/L — LOW (ref 22–31)
CO2 SERPL-SCNC: 21 MMOL/L — LOW (ref 22–31)
CREAT SERPL-MCNC: 1.61 MG/DL — HIGH (ref 0.5–1.3)
CREAT SERPL-MCNC: 1.67 MG/DL — HIGH (ref 0.5–1.3)
CREAT SERPL-MCNC: 1.71 MG/DL — HIGH (ref 0.5–1.3)
EOSINOPHIL # BLD AUTO: 0.1 K/UL — SIGNIFICANT CHANGE UP (ref 0–0.5)
EOSINOPHIL NFR BLD AUTO: 1.8 % — SIGNIFICANT CHANGE UP (ref 0–6)
GLUCOSE SERPL-MCNC: 82 MG/DL — SIGNIFICANT CHANGE UP (ref 70–99)
GLUCOSE SERPL-MCNC: 84 MG/DL — SIGNIFICANT CHANGE UP (ref 70–99)
GLUCOSE SERPL-MCNC: 93 MG/DL — SIGNIFICANT CHANGE UP (ref 70–99)
HCT VFR BLD CALC: 34.6 % — LOW (ref 39–50)
HGB BLD-MCNC: 11.3 G/DL — LOW (ref 13–17)
INR BLD: 1.18 RATIO — HIGH (ref 0.88–1.16)
LYMPHOCYTES # BLD AUTO: 1.7 K/UL — SIGNIFICANT CHANGE UP (ref 1–3.3)
LYMPHOCYTES # BLD AUTO: 28.3 % — SIGNIFICANT CHANGE UP (ref 13–44)
MAGNESIUM SERPL-MCNC: 2 MG/DL — SIGNIFICANT CHANGE UP (ref 1.6–2.6)
MAGNESIUM SERPL-MCNC: 2.1 MG/DL — SIGNIFICANT CHANGE UP (ref 1.6–2.6)
MCHC RBC-ENTMCNC: 28.2 PG — SIGNIFICANT CHANGE UP (ref 27–34)
MCHC RBC-ENTMCNC: 32.8 GM/DL — SIGNIFICANT CHANGE UP (ref 32–36)
MCV RBC AUTO: 85.8 FL — SIGNIFICANT CHANGE UP (ref 80–100)
MONOCYTES # BLD AUTO: 1.2 K/UL — HIGH (ref 0–0.9)
MONOCYTES NFR BLD AUTO: 19.7 % — HIGH (ref 2–14)
NEUTROPHILS # BLD AUTO: 3 K/UL — SIGNIFICANT CHANGE UP (ref 1.8–7.4)
NEUTROPHILS NFR BLD AUTO: 49.5 % — SIGNIFICANT CHANGE UP (ref 43–77)
NT-PROBNP SERPL-SCNC: 2149 PG/ML — HIGH (ref 0–300)
PLATELET # BLD AUTO: 263 K/UL — SIGNIFICANT CHANGE UP (ref 150–400)
POTASSIUM SERPL-MCNC: 6.1 MMOL/L — HIGH (ref 3.5–5.3)
POTASSIUM SERPL-MCNC: 6.3 MMOL/L — CRITICAL HIGH (ref 3.5–5.3)
POTASSIUM SERPL-MCNC: 6.5 MMOL/L — CRITICAL HIGH (ref 3.5–5.3)
POTASSIUM SERPL-SCNC: 6.1 MMOL/L — HIGH (ref 3.5–5.3)
POTASSIUM SERPL-SCNC: 6.3 MMOL/L — CRITICAL HIGH (ref 3.5–5.3)
POTASSIUM SERPL-SCNC: 6.5 MMOL/L — CRITICAL HIGH (ref 3.5–5.3)
PROT SERPL-MCNC: 7.4 G/DL — SIGNIFICANT CHANGE UP (ref 6–8.3)
PROT SERPL-MCNC: 7.7 G/DL — SIGNIFICANT CHANGE UP (ref 6–8.3)
PROT SERPL-MCNC: 7.7 G/DL — SIGNIFICANT CHANGE UP (ref 6–8.3)
PROTHROM AB SERPL-ACNC: 13.5 SEC — HIGH (ref 10–12.9)
RBC # BLD: 4.03 M/UL — LOW (ref 4.2–5.8)
RBC # FLD: 18.9 % — HIGH (ref 10.3–14.5)
RH IG SCN BLD-IMP: POSITIVE — SIGNIFICANT CHANGE UP
SODIUM SERPL-SCNC: 134 MMOL/L — LOW (ref 135–145)
SODIUM SERPL-SCNC: 138 MMOL/L — SIGNIFICANT CHANGE UP (ref 135–145)
SODIUM SERPL-SCNC: 140 MMOL/L — SIGNIFICANT CHANGE UP (ref 135–145)
TACROLIMUS SERPL-MCNC: 13.5 NG/ML — SIGNIFICANT CHANGE UP
WBC # BLD: 6.1 K/UL — SIGNIFICANT CHANGE UP (ref 3.8–10.5)
WBC # FLD AUTO: 6.1 K/UL — SIGNIFICANT CHANGE UP (ref 3.8–10.5)

## 2018-11-07 PROCEDURE — 99215 OFFICE O/P EST HI 40 MIN: CPT | Mod: PD

## 2018-11-07 RX ORDER — SODIUM CHLORIDE 9 MG/ML
3 INJECTION INTRAMUSCULAR; INTRAVENOUS; SUBCUTANEOUS EVERY 8 HOURS
Qty: 0 | Refills: 0 | Status: DISCONTINUED | OUTPATIENT
Start: 2018-11-07 | End: 2018-11-08

## 2018-11-07 RX ORDER — VANCOMYCIN HCL 1 G
125 VIAL (EA) INTRAVENOUS
Qty: 0 | Refills: 0 | Status: DISCONTINUED | OUTPATIENT
Start: 2018-11-07 | End: 2018-11-08

## 2018-11-07 RX ORDER — TACROLIMUS 5 MG/1
4 CAPSULE ORAL
Qty: 0 | Refills: 0 | Status: DISCONTINUED | OUTPATIENT
Start: 2018-11-07 | End: 2018-11-08

## 2018-11-07 RX ORDER — MAGNESIUM OXIDE 400 MG ORAL TABLET 241.3 MG
400 TABLET ORAL
Qty: 0 | Refills: 0 | Status: DISCONTINUED | OUTPATIENT
Start: 2018-11-07 | End: 2018-11-08

## 2018-11-07 RX ORDER — FAMOTIDINE 10 MG/ML
20 INJECTION INTRAVENOUS DAILY
Qty: 0 | Refills: 0 | Status: DISCONTINUED | OUTPATIENT
Start: 2018-11-07 | End: 2018-11-08

## 2018-11-07 RX ORDER — ASPIRIN/CALCIUM CARB/MAGNESIUM 324 MG
81 TABLET ORAL DAILY
Qty: 0 | Refills: 0 | Status: DISCONTINUED | OUTPATIENT
Start: 2018-11-07 | End: 2018-11-08

## 2018-11-07 RX ORDER — SODIUM POLYSTYRENE SULFONATE 4.1 MEQ/G
30 POWDER, FOR SUSPENSION ORAL ONCE
Qty: 0 | Refills: 0 | Status: COMPLETED | OUTPATIENT
Start: 2018-11-07 | End: 2018-11-07

## 2018-11-07 RX ORDER — ACETAMINOPHEN 500 MG
650 TABLET ORAL EVERY 6 HOURS
Qty: 0 | Refills: 0 | Status: DISCONTINUED | OUTPATIENT
Start: 2018-11-07 | End: 2018-11-08

## 2018-11-07 RX ADMIN — Medication 20 MILLIGRAM(S): at 21:50

## 2018-11-07 RX ADMIN — SODIUM POLYSTYRENE SULFONATE 30 GRAM(S): 4.1 POWDER, FOR SUSPENSION ORAL at 20:02

## 2018-11-07 RX ADMIN — MAGNESIUM OXIDE 400 MG ORAL TABLET 400 MILLIGRAM(S): 241.3 TABLET ORAL at 20:19

## 2018-11-07 RX ADMIN — TACROLIMUS 4 MILLIGRAM(S): 5 CAPSULE ORAL at 20:01

## 2018-11-07 RX ADMIN — SODIUM CHLORIDE 3 MILLILITER(S): 9 INJECTION INTRAMUSCULAR; INTRAVENOUS; SUBCUTANEOUS at 21:51

## 2018-11-07 NOTE — H&P CARDIOLOGY - PSH
AICD (Automatic Cardioverter/Defibrillator) Present  [employer] with 1 [employer] lead4/1/09- explanted and replaced with [employer] 2 leads on 9/2/09  H/O heart transplant  2/2018  History of Prior Ablation Treatment  for afib  S/P right heart catheterization  biopsy multiple  Status post left hip replacement

## 2018-11-07 NOTE — H&P CARDIOLOGY - HISTORY OF PRESENT ILLNESS
68 year old man PMH NICM HFrEF s/p HM2 LVAD 6/2017, who underwent heart transplant on 2/23/18 (CMV D-/R+ and Toxo D-/R+, Hep C+ heart) with post op graft dysfunction who underwent plasmapheresis and IVIG x2 as well as rituximab, with suspected fungal PNA diagnosed 6/2018  treated w voriconazole, recurrent PNA with new RUL infiltrate on CT scan 8/18, which  improved on broad spectrum antibiotics. S/p lung biopsy 8/18. C diff toxin positive -still  on oral vanco,.      Patient presents today for RHC and biopsy s/p bacterial infection on last visit on oral antibiotics with Rt arm PICC line removed.  Pt states feeling well today. 68 year old man PMH NICM HFrEF s/p HM2 LVAD 6/2017, who underwent heart transplant on 2/23/18 (CMV D-/R+ and Toxo D-/R+, Hep C+ heart) with post op graft dysfunction who underwent plasmapheresis and IVIG x2 as well as rituximab, with suspected fungal PNA diagnosed 6/2018  treated w voriconazole, recurrent PNA with new RUL infiltrate on CT scan 8/18, which  improved on broad spectrum antibiotics. S/p lung biopsy 8/18. C diff toxin positive c/b colitis on last RHC w/biopsy noted to be febrile and swelling s/p hospitalization for PNA and [facility] continues on oral ABX and Vanco taper with Rt arm PICC line removed and presents today for RHC and biopsy. Pt states feeling well today.

## 2018-11-07 NOTE — H&P CARDIOLOGY - PMH
CHF (Congestive Heart Failure)    DVT of upper extremity (deep vein thrombosis)    Former smoker    GIB (gastrointestinal bleeding)    Hepatitis C virus    HLD (hyperlipidemia)    HTN    Non-Ischemic Cardiomyopathy    PAF (paroxysmal atrial fibrillation)  on xarelto  SVT (Supraventricular Tachycardia)    Ventricular fibrillation  s/p AICD

## 2018-11-08 ENCOUNTER — TRANSCRIPTION ENCOUNTER (OUTPATIENT)
Age: 68
End: 2018-11-08

## 2018-11-08 VITALS — WEIGHT: 139.55 LBS

## 2018-11-08 DIAGNOSIS — A43.0 PULMONARY NOCARDIOSIS: ICD-10-CM

## 2018-11-08 DIAGNOSIS — E87.5 HYPERKALEMIA: ICD-10-CM

## 2018-11-08 DIAGNOSIS — Z94.1 HEART TRANSPLANT STATUS: ICD-10-CM

## 2018-11-08 LAB
ALBUMIN SERPL ELPH-MCNC: 4.2 G/DL — SIGNIFICANT CHANGE UP (ref 3.3–5)
ALP SERPL-CCNC: 90 U/L — SIGNIFICANT CHANGE UP (ref 40–120)
ALT FLD-CCNC: 18 U/L — SIGNIFICANT CHANGE UP (ref 10–45)
ANION GAP SERPL CALC-SCNC: 11 MMOL/L — SIGNIFICANT CHANGE UP (ref 5–17)
APPEARANCE UR: ABNORMAL
AST SERPL-CCNC: 33 U/L — SIGNIFICANT CHANGE UP (ref 10–40)
BASOPHILS # BLD AUTO: 0.01 K/UL — SIGNIFICANT CHANGE UP (ref 0–0.2)
BASOPHILS NFR BLD AUTO: 0.2 % — SIGNIFICANT CHANGE UP (ref 0–2)
BILIRUB SERPL-MCNC: 0.8 MG/DL — SIGNIFICANT CHANGE UP (ref 0.2–1.2)
BILIRUB UR-MCNC: NEGATIVE — SIGNIFICANT CHANGE UP
BUN SERPL-MCNC: 36 MG/DL — HIGH (ref 7–23)
CALCIUM SERPL-MCNC: 9.8 MG/DL — SIGNIFICANT CHANGE UP (ref 8.4–10.5)
CHLORIDE SERPL-SCNC: 103 MMOL/L — SIGNIFICANT CHANGE UP (ref 96–108)
CMV DNA CSF QL NAA+PROBE: 144 — SIGNIFICANT CHANGE UP
CMV DNA SPEC NAA+PROBE-LOG#: 2.16 LOGIU/ML — HIGH
CO2 SERPL-SCNC: 20 MMOL/L — LOW (ref 22–31)
COLOR SPEC: YELLOW — SIGNIFICANT CHANGE UP
CREAT SERPL-MCNC: 1.59 MG/DL — HIGH (ref 0.5–1.3)
DIFF PNL FLD: NEGATIVE — SIGNIFICANT CHANGE UP
EOSINOPHIL # BLD AUTO: 0.13 K/UL — SIGNIFICANT CHANGE UP (ref 0–0.5)
EOSINOPHIL NFR BLD AUTO: 2.3 % — SIGNIFICANT CHANGE UP (ref 0–6)
ESTIMATED AVERAGE GLUCOSE: 88 MG/DL — SIGNIFICANT CHANGE UP (ref 68–114)
GLUCOSE SERPL-MCNC: 84 MG/DL — SIGNIFICANT CHANGE UP (ref 70–99)
GLUCOSE UR QL: NEGATIVE — SIGNIFICANT CHANGE UP
HBA1C BLD-MCNC: 4.7 % — SIGNIFICANT CHANGE UP (ref 4–5.6)
HBA1C BLD-MCNC: 4.7 % — SIGNIFICANT CHANGE UP (ref 4–5.6)
HCT VFR BLD CALC: 34.2 % — LOW (ref 39–50)
HGB BLD-MCNC: 10.7 G/DL — LOW (ref 13–17)
IMM GRANULOCYTES NFR BLD AUTO: 0.9 % — SIGNIFICANT CHANGE UP (ref 0–1.5)
KETONES UR-MCNC: NEGATIVE — SIGNIFICANT CHANGE UP
LEUKOCYTE ESTERASE UR-ACNC: ABNORMAL
LYMPHOCYTES # BLD AUTO: 1.56 K/UL — SIGNIFICANT CHANGE UP (ref 1–3.3)
LYMPHOCYTES # BLD AUTO: 27.3 % — SIGNIFICANT CHANGE UP (ref 13–44)
MCHC RBC-ENTMCNC: 25.9 PG — LOW (ref 27–34)
MCHC RBC-ENTMCNC: 31.3 GM/DL — LOW (ref 32–36)
MCV RBC AUTO: 82.8 FL — SIGNIFICANT CHANGE UP (ref 80–100)
MONOCYTES # BLD AUTO: 1.29 K/UL — HIGH (ref 0–0.9)
MONOCYTES NFR BLD AUTO: 22.6 % — HIGH (ref 2–14)
NEUTROPHILS # BLD AUTO: 2.68 K/UL — SIGNIFICANT CHANGE UP (ref 1.8–7.4)
NEUTROPHILS NFR BLD AUTO: 46.7 % — SIGNIFICANT CHANGE UP (ref 43–77)
NITRITE UR-MCNC: NEGATIVE — SIGNIFICANT CHANGE UP
PH UR: 7 — SIGNIFICANT CHANGE UP (ref 5–8)
PLATELET # BLD AUTO: 282 K/UL — SIGNIFICANT CHANGE UP (ref 150–400)
POTASSIUM SERPL-MCNC: 5.3 MMOL/L — SIGNIFICANT CHANGE UP (ref 3.5–5.3)
POTASSIUM SERPL-SCNC: 5.3 MMOL/L — SIGNIFICANT CHANGE UP (ref 3.5–5.3)
PROT SERPL-MCNC: 7.2 G/DL — SIGNIFICANT CHANGE UP (ref 6–8.3)
PROT UR-MCNC: ABNORMAL
RBC # BLD: 4.13 M/UL — LOW (ref 4.2–5.8)
RBC # FLD: 19.8 % — HIGH (ref 10.3–14.5)
SODIUM SERPL-SCNC: 134 MMOL/L — LOW (ref 135–145)
SP GR SPEC: 1.02 — SIGNIFICANT CHANGE UP (ref 1.01–1.02)
TACROLIMUS SERPL-MCNC: 22 NG/ML — SIGNIFICANT CHANGE UP
TSH SERPL-MCNC: <0.01 UIU/ML — LOW (ref 0.27–4.2)
UROBILINOGEN FLD QL: NEGATIVE — SIGNIFICANT CHANGE UP
WBC # BLD: 5.72 K/UL — SIGNIFICANT CHANGE UP (ref 3.8–10.5)
WBC # FLD AUTO: 5.72 K/UL — SIGNIFICANT CHANGE UP (ref 3.8–10.5)

## 2018-11-08 PROCEDURE — 83880 ASSAY OF NATRIURETIC PEPTIDE: CPT

## 2018-11-08 PROCEDURE — 80053 COMPREHEN METABOLIC PANEL: CPT

## 2018-11-08 PROCEDURE — 86901 BLOOD TYPING SEROLOGIC RH(D): CPT

## 2018-11-08 PROCEDURE — 84443 ASSAY THYROID STIM HORMONE: CPT

## 2018-11-08 PROCEDURE — 83735 ASSAY OF MAGNESIUM: CPT

## 2018-11-08 PROCEDURE — 83036 HEMOGLOBIN GLYCOSYLATED A1C: CPT

## 2018-11-08 PROCEDURE — 85027 COMPLETE CBC AUTOMATED: CPT

## 2018-11-08 PROCEDURE — 93005 ELECTROCARDIOGRAM TRACING: CPT

## 2018-11-08 PROCEDURE — 85730 THROMBOPLASTIN TIME PARTIAL: CPT

## 2018-11-08 PROCEDURE — 81001 URINALYSIS AUTO W/SCOPE: CPT

## 2018-11-08 PROCEDURE — 86850 RBC ANTIBODY SCREEN: CPT

## 2018-11-08 PROCEDURE — 85610 PROTHROMBIN TIME: CPT

## 2018-11-08 PROCEDURE — 86900 BLOOD TYPING SEROLOGIC ABO: CPT

## 2018-11-08 PROCEDURE — 71045 X-RAY EXAM CHEST 1 VIEW: CPT

## 2018-11-08 PROCEDURE — 80197 ASSAY OF TACROLIMUS: CPT

## 2018-11-08 RX ORDER — TACROLIMUS 5 MG/1
3 CAPSULE ORAL
Qty: 0 | Refills: 0 | Status: DISCONTINUED | OUTPATIENT
Start: 2018-11-08 | End: 2018-11-08

## 2018-11-08 RX ORDER — DOCUSATE SODIUM 100 MG
200 CAPSULE ORAL AT BEDTIME
Qty: 0 | Refills: 0 | Status: DISCONTINUED | OUTPATIENT
Start: 2018-11-08 | End: 2018-11-08

## 2018-11-08 RX ORDER — TACROLIMUS 5 MG/1
5 CAPSULE ORAL
Qty: 0 | Refills: 0 | DISCHARGE
Start: 2018-11-08

## 2018-11-08 RX ORDER — SODIUM BICARBONATE 1 MEQ/ML
1 SYRINGE (ML) INTRAVENOUS
Qty: 0 | Refills: 0 | DISCHARGE
Start: 2018-11-08

## 2018-11-08 RX ORDER — MAGNESIUM OXIDE 400 MG ORAL TABLET 241.3 MG
1 TABLET ORAL
Qty: 0 | Refills: 0 | COMMUNITY
Start: 2018-11-08

## 2018-11-08 RX ORDER — ASPIRIN/CALCIUM CARB/MAGNESIUM 324 MG
1 TABLET ORAL
Qty: 0 | Refills: 0 | DISCHARGE
Start: 2018-11-08

## 2018-11-08 RX ORDER — DOCUSATE SODIUM 100 MG
2 CAPSULE ORAL
Qty: 0 | Refills: 0 | DISCHARGE
Start: 2018-11-08

## 2018-11-08 RX ORDER — TACROLIMUS 5 MG/1
3 CAPSULE ORAL
Qty: 0 | Refills: 0 | COMMUNITY
Start: 2018-11-08

## 2018-11-08 RX ORDER — MAGNESIUM OXIDE 400 MG ORAL TABLET 241.3 MG
1 TABLET ORAL
Qty: 0 | Refills: 0 | DISCHARGE
Start: 2018-11-08

## 2018-11-08 RX ORDER — SODIUM BICARBONATE 1 MEQ/ML
650 SYRINGE (ML) INTRAVENOUS
Qty: 0 | Refills: 0 | Status: DISCONTINUED | OUTPATIENT
Start: 2018-11-08 | End: 2018-11-08

## 2018-11-08 RX ORDER — ACETAMINOPHEN 500 MG
2 TABLET ORAL
Qty: 0 | Refills: 0 | DISCHARGE
Start: 2018-11-08

## 2018-11-08 RX ADMIN — Medication 1 TABLET(S): at 11:46

## 2018-11-08 RX ADMIN — Medication 2 TABLET(S): at 05:53

## 2018-11-08 RX ADMIN — Medication 81 MILLIGRAM(S): at 11:42

## 2018-11-08 RX ADMIN — MAGNESIUM OXIDE 400 MG ORAL TABLET 400 MILLIGRAM(S): 241.3 TABLET ORAL at 11:42

## 2018-11-08 RX ADMIN — MAGNESIUM OXIDE 400 MG ORAL TABLET 400 MILLIGRAM(S): 241.3 TABLET ORAL at 08:08

## 2018-11-08 RX ADMIN — FAMOTIDINE 20 MILLIGRAM(S): 10 INJECTION INTRAVENOUS at 11:42

## 2018-11-08 RX ADMIN — SODIUM CHLORIDE 3 MILLILITER(S): 9 INJECTION INTRAMUSCULAR; INTRAVENOUS; SUBCUTANEOUS at 05:53

## 2018-11-08 RX ADMIN — TACROLIMUS 4 MILLIGRAM(S): 5 CAPSULE ORAL at 08:08

## 2018-11-08 NOTE — ASSESSMENT
[FreeTextEntry1] : Mr. Baez is a 68 year old man with history of NICM, s/p heart transplantation on 2/23/18. 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 He preiously received therapy with IVIG, plasmapheresis, and rituximab with course complicated by development of Nocardia pulmonary infection, for which he is currently on therapy. He is hyperkalemic today, with evidence of worsening renal function. His tacrolimus level is elevated.

## 2018-11-08 NOTE — DISCHARGE NOTE ADULT - PATIENT PORTAL LINK FT
You can access the [facility] Portal, offered by [facility], by registering with the following website: [email or web address]

## 2018-11-08 NOTE — PHARMACOTHERAPY INTERVENTION NOTE - COMMENTS
Learner: Patient  Barriers: None     Patient received a cardiac transplant was re-admitted for hyperkalemia.     Method used: Verbal discussion and written materials    Medication safety was discussed with the patient: allergies, herbals, adherence, interactions, medication precautions, miss dose instructions, purpose, side effects, signs and symptoms to report, and storage & handling    Patient was able to repeat and verbalize key points    Education Summary: Reinforced discharge immunosuppressant medications and prophylactic anti-infective agents reviewed with the patient. Outpatient medication schedule was discussed in detail including: medication name, indication, dose, administration times, treatment duration, side effects, drug interactions, and special instructions. Patient questions and concerns were answered and addressed. Patient demonstrated understanding.    Time spent discharge education: 30 minutes    MEDICATIONS  (STANDING):  aspirin enteric coated 81 milliGRAM(s) Oral daily  calcium citrate  1500 mG + Vitamin D (CITRACAL + D3 Maximum) 2 Tablet(s) Oral two times a day  docusate sodium 200 milliGRAM(s) Oral at bedtime  famotidine    Tablet 20 milliGRAM(s) Oral daily  magnesium oxide 400 milliGRAM(s) Oral three times a day with meals  multivitamin 1 Tablet(s) Oral daily  pravastatin 20 milliGRAM(s) Oral at bedtime  sodium bicarbonate 650 milliGRAM(s) Oral two times a day  sodium chloride 0.9% lock flush 3 milliLiter(s) IV Push every 8 hours  tacrolimus 3 milliGRAM(s) Oral <User Schedule>  vancomycin    Solution 125 milliGRAM(s) Oral <User Schedule>    MEDICATIONS  (PRN):  acetaminophen   Tablet .. 650 milliGRAM(s) Oral every 6 hours PRN Temp greater or equal to 38C (100.4F), Moderate Pain (4 - 6)      Cardiac Transplant Medications:  Tacrolimus adjusted to trough - lowered to 3 mG PO twice a day   Mycophenolate mofetil - on hold for now   Sulfamethoxazole/Trimethoprim DS 1 tablet PO twice a day (will be resumed)  Valganciclovir - on hold for now   Docusate and Senna                       10.7   5.72  )-----------( 282      ( 08 Nov 2018 08:13 )             34.2     11-08    134<L>  |  103  |  36<H>  ----------------------------<  84  5.3   |  20<L>  |  1.59<H>    Ca    9.8      08 Nov 2018 06:11  Mg     2.1     11-07    TPro  7.2  /  Alb  4.2  /  TBili  0.8  /  DBili  x   /  AST  33  /  ALT  18  /  AlkPhos  90  11-08    LIVER FUNCTIONS - ( 08 Nov 2018 06:11 )  Alb: 4.2 g/dL / Pro: 7.2 g/dL / ALK PHOS: 90 U/L / ALT: 18 U/L / AST: 33 U/L / GGT: x           Tacrolimus (), Serum: 22.0 ng/mL (11-08-18 @ 08:05)  Tacrolimus (), Serum: 13.5 ng/mL (11-07-18 @ 10:56)

## 2018-11-08 NOTE — DISCHARGE NOTE ADULT - OTHER SIGNIFICANT FINDINGS
General: WN/WD NAD  Neurology: A&Ox3, nonfocal, URVALCABA x 4  Head:  Normocephalic, atraumatic  ENT:  Mucosa moist, no ulcerations  Neck:  Supple, no sinuses or palpable masses  Lymphatic:  No palpable cervical, supraclavicular, axillary or inguinal adenopathy  Respiratory: CTA B/L  CV: RRR, S1S2, no murmur  Abdominal: Soft, NT, ND no palpable mass  MSK: No edema, + peripheral pulses, FROM all 4 extremity  Spent more than 30 min with patient.

## 2018-11-08 NOTE — CONSULT NOTE ADULT - PROBLEM SELECTOR RECOMMENDATION 2
Immunosuppression  Tacrolimus 1mg Q12.  (goal 8-10).  CellCept on hold due to infection.  Prednisone 3 mg po daily.  Repeat TTE ordered    Antimicrobial prophylaxis:  Intermediate risk CMV - Valcyte initially held due to leukopenia. He is 6 months post-transplant so will monitor CMV PCR. Please check CMV PCR sent today.  Intermediate risk Toxo - continue Mepron 1500 mg po daily  PCP prophy - covered by Mepron.  Hep C+ - he completed a course of Mayvret with undetectable Hep C virus now. Will need follow-up increased risk donor testing at 12 months post-OHT.    Other prophylaxis:  Pepcid 20 mg po daily  ECASA 81 mg po daily  Citracal + D 2 tabs po BID  Pravachol 20 mg po QHS.  Please DC PO mag as it can contribute to diarrhea. Check Mg with next labs and supplement with IV mag PRN for goal of 2-2.5 Immunosuppression prophylaxis:  Tacrolimus to be held tonight then resume at 3 mg PO BID tomorrow. Target trough 8-10 ng/dL.  CellCept remains on hold    Antimicrobial prophylaxis:  Intermediate risk CMV - Off Valcyte for leukopenia. CMV PCR   Intermediate risk Toxo & PCP - Covered by Bactrim    Other prophylaxis:  Pepcid 20 mg po QHS  ECASA 81 mg po daily  Citracal + D 2 tabs po BID  Pravastatin 20 mg po QHS    Plan for protocol RHC/EMB next week on Wednesday.

## 2018-11-08 NOTE — PHARMACOTHERAPY INTERVENTION NOTE - COMMENTS
Reviewed medications - will resume the following medications:  - Multivitamins 1 tablet PO daily  - Docusate 2 capsules PO at bedtime  - Pravastatin 20 mG PO at bedtime  - Sodium bicarbonate 1 tablet PO twice a day  - Will follow up on tacrolimus level (the outpatient dose is 3 mG twice day but ordered 4 mG twice a day)  - Will follow up regarding when Bactrim DS 1 tablet PO twice a day can be restarted

## 2018-11-08 NOTE — CONSULT NOTE ADULT - SUBJECTIVE AND OBJECTIVE BOX
CHIEF COMPLAINT: "I feel fine."    HISTORY OF PRESENT ILLNESS: Mr. Baez is a 67 yo male s/p HM2 LVAD (6/2017) for NICM that was complicated by recurrent GIB and subsequent pump thrombosis who had OHT on 2/23/18 (CMV D-/R+, Toxo D-/R+, HCV+ donor). 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 He developed joshua evidence of graft dysfunction leading to treatment with plasmapheresis, IVIG and rituximab.    In June, he was admitted with L sided infiltrate, fevers, and chills. CT guided biopsy was unremarkable. He was treated empirically with abx and discharged on voriconazole for presumed fungal PNA. He was readmitted 8/14-8/29 with recurrent signs and symptoms of infection. CT scan showed a new right upper lobe infiltrated. BCx were negative. CT guided bx grew only Corynebacterium. He was treated with IV cefepime x 14 days with improvement in symptoms. Also had C. Diff and was treated with oral vancomycin. Eventually, he was diagnosed with Nocardia and put on Bactrim with a marked improvement in clinical course.    He presented yesterday for protocol RHC and EMB, but had hyperkalemia noted on routine labs despite being on Veltassa 8.4 grams daily. He denies any symptoms and has overall felt very well. He has been avoiding bananas, but doesn't know much about a low potassium diet.       Allergies  No Known Allergies      HOSP MEDICATIONS:  aspirin enteric coated 81 milliGRAM(s) Oral daily  atovaquone Suspension 1500 milliGRAM(s) Oral daily  metroNIDAZOLE  IVPB 500 milliGRAM(s) IV Intermittent every 8 hours  vancomycin    Solution 125 milliGRAM(s) Oral every 6 hours  voriconazole 200 milliGRAM(s) Oral every 12 hours  famotidine    Tablet 20 milliGRAM(s) Oral daily  insulin lispro (HumaLOG) corrective regimen sliding scale   SubCutaneous three times a day before meals  insulin lispro (HumaLOG) corrective regimen sliding scale   SubCutaneous at bedtime  pravastatin 20 milliGRAM(s) Oral at bedtime  predniSONE   Tablet 3 milliGRAM(s) Oral <User Schedule>  magnesium oxide 400 milliGRAM(s) Oral <User Schedule>  multivitamin 1 Tablet(s) Oral daily  sodium bicarbonate 650 milliGRAM(s) Oral <User Schedule>  tacrolimus 1 milliGRAM(s) Oral <User Schedule>    PAST MEDICAL & SURGICAL HISTORY:  HLD (hyperlipidemia)  Former smoker  DVT of upper extremity (deep vein thrombosis)  Hepatitis C virus  GIB (gastrointestinal bleeding)  Ventricular fibrillation: s/p AICD  PAF (paroxysmal atrial fibrillation): on xarelto  Non-Ischemic Cardiomyopathy  SVT (Supraventricular Tachycardia)  HTN  CHF (Congestive Heart Failure)  S/P right heart catheterization: biopsy multiple  H/O heart transplant: 2/2018  Status post left hip replacement  History of Prior Ablation Treatment: for afib  AICD (Automatic Cardioverter/Defibrillator) Present: [employer] with 1 [employer] lead4/1/09- explanted and replaced with Medtronic 2 leads on 9/2/09    FAMILY HISTORY:  No pertinent family history in first degree relatives    SOCIAL HISTORY:    Denies tobacco, alcohol and illicit drug use.    REVIEW OF SYSTEMS:  14 point ROS done and found to be negative or noncontributory other than noted in HPI    Vital Signs Last 24 Hrs  T(C): 36.6 (08 Nov 2018 05:30), Max: 36.6 (08 Nov 2018 05:30)  T(F): 97.8 (08 Nov 2018 05:30), Max: 97.8 (08 Nov 2018 05:30)  HR: 109 (08 Nov 2018 05:30) (109 - 124)  BP: 129/90 (08 Nov 2018 05:30) (129/90 - 130/76)  RR: 16 (08 Nov 2018 05:30) (16 - 18)  SpO2: 97% (08 Nov 2018 05:30) (96% - 97%)    PHYSICAL EXAM:  Appearance: Normal, NAD   HEENT:   Normal oral mucosa, PERRL, EOMI	  Cardiovascular: Tachy S1 S2, JVP < 6 cm H2O, no M/R/G  Respiratory: Lungs clear to auscultation	  Gastrointestinal:  Soft, Non-tender, + BS	  Skin: No rashes, No ecchymoses	  Neurologic: Non-focal  Extremities: No clubbing, cyanosis or edema  Vascular: Peripheral pulses palpable bilaterally  Psychiatry: A & O x 3, Mood & affect appropriate        LABS:                        10.7   5.72  )-----------( 282      ( 08 Nov 2018 08:13 )             34.2     11-08    134<L>  |  103  |  36<H>  ----------------------------<  84  5.3   |  20<L>  |  1.59<H>    Ca    9.8      08 Nov 2018 06:11  Mg     2.1     11-07    TPro  7.2  /  Alb  4.2  /  TBili  0.8  /  DBili  x   /  AST  33  /  ALT  18  /  AlkPhos  90  11-08    Tacrolimus level is pending for today CHIEF COMPLAINT: "I feel fine."    HISTORY OF PRESENT ILLNESS:   Mr. Baez is a 68 year old man with past medical history of a nonischemic cardiomyopathy and chronic systolic heart failure s/p HM2 LVAD in June 2017 complicated by recurrent GI hemorrhage and possible pump thrombosis who underwent heart transplant on 2/23/18 with a hepatitis C positive donor. His course was complicated by bilateral IJ/subclavian thrombi, a persistent small right sided pleural effusion, as well as acute on chronic renal failure of unclear etiology. In addition, his post-operative course was complicated by graft dysfunction of unclear etiology and persistent C4d positive staining on endomyocardial biopsy with negative DSAs. He developed joshua evidence of graft dysfunction leading to treatment with plasmapheresis, IVIG, and rituximab.     In June, he was admitted with a left sided infiltrate, fevers, and chills. CT guided biopsy was unremarkable. He was treated empirically with antibiotics and discharged on voriconazole for presumed fungal pneumonia. He was readmitted on 8/14 through 8/29 and again in September with recurrent signs and symptoms of infection. CT scan showed a new right upper lobe infiltrate and he was ultimately diagnosed with Nocardia pneumonia and has been on treatment with Imipenem. He was also found to have evidence of C. diff and was treated with oral vancomycin.     He presents today for RHC with biopsy. He currently feels well. He was recently discharged from [facility] and was transitioned from IV antibiotics to oral Bactrim for ongoing treatment of Nocardia. He was simulatneously started on Veltessa given prior episodes of hyperkalemia. He notes that he can walk without shortness of breath, but is primarily limited by ongoing chronic right knee pain. He has no PND or orthopnea and no cough. He denies fevers or chills. He has no abdominal pain, increase in abdominal girth or swelling in the legs. He has no diarrhea or rashes.         Mr. Baez is a 69 yo male s/p HM2 LVAD (6/2017) for NICM that was complicated by recurrent GIB and subsequent pump thrombosis who had OHT on 2/23/18 (CMV D-/R+, Toxo D-/R+, HCV+ donor). 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 He developed joshua evidence of graft dysfunction leading to treatment with plasmapheresis, IVIG and rituximab.    In June, he was admitted with L sided infiltrate, fevers, and chills. CT guided biopsy was unremarkable. He was treated empirically with abx and discharged on voriconazole for presumed fungal PNA. He was readmitted 8/14-8/29 with recurrent signs and symptoms of infection. CT scan showed a new right upper lobe infiltrated. BCx were negative. CT guided bx grew only Corynebacterium. He was treated with IV cefepime x 14 days with improvement in symptoms. Also had C. Diff and was treated with oral vancomycin. Eventually, he was diagnosed with Nocardia and put on Bactrim with a marked improvement in clinical course.    He presented yesterday for protocol RHC and EMB, but had hyperkalemia noted on routine labs despite being on Veltassa 8.4 grams daily. He denies any symptoms and has overall felt very well. He has been avoiding bananas, but doesn't know much about a low potassium diet.       Allergies  No Known Allergies    MEDICATIONS  (STANDING):  aspirin enteric coated 81 milliGRAM(s) Oral daily  calcium citrate  1500 mG + Vitamin D (CITRACAL + D3 Maximum) 2 Tablet(s) Oral two times a day  docusate sodium 200 milliGRAM(s) Oral at bedtime  famotidine    Tablet 20 milliGRAM(s) Oral daily  magnesium oxide 400 milliGRAM(s) Oral three times a day with meals  multivitamin 1 Tablet(s) Oral daily  pravastatin 20 milliGRAM(s) Oral at bedtime  sodium bicarbonate 650 milliGRAM(s) Oral two times a day  sodium chloride 0.9% lock flush 3 milliLiter(s) IV Push every 8 hours  tacrolimus 3 milliGRAM(s) Oral <User Schedule>  vancomycin    Solution 125 milliGRAM(s) Oral <User Schedule>      PAST MEDICAL & SURGICAL HISTORY:  HLD (hyperlipidemia)  Former smoker  DVT of upper extremity (deep vein thrombosis)  Hepatitis C virus  GIB (gastrointestinal bleeding)  Ventricular fibrillation: s/p AICD  PAF (paroxysmal atrial fibrillation): on xarelto  Non-Ischemic Cardiomyopathy  SVT (Supraventricular Tachycardia)  HTN  CHF (Congestive Heart Failure)  S/P right heart catheterization: biopsy multiple  H/O heart transplant: 2/2018  Status post left hip replacement  History of Prior Ablation Treatment: for afib  AICD (Automatic Cardioverter/Defibrillator) Present: [employer] with 1 [employer] lead4/1/09- explanted and replaced with Medtronic 2 leads on 9/2/09    FAMILY HISTORY:  No pertinent family history in first degree relatives    SOCIAL HISTORY:    Denies tobacco, alcohol and illicit drug use.    REVIEW OF SYSTEMS:  14 point ROS done and found to be negative or noncontributory other than noted in HPI    Vital Signs Last 24 Hrs  T(C): 36.6 (08 Nov 2018 05:30), Max: 36.6 (08 Nov 2018 05:30)  T(F): 97.8 (08 Nov 2018 05:30), Max: 97.8 (08 Nov 2018 05:30)  HR: 109 (08 Nov 2018 05:30) (109 - 124)  BP: 129/90 (08 Nov 2018 05:30) (129/90 - 130/76)  RR: 16 (08 Nov 2018 05:30) (16 - 18)  SpO2: 97% (08 Nov 2018 05:30) (96% - 97%)    PHYSICAL EXAM:  Appearance: Normal, NAD   HEENT:   Normal oral mucosa, PERRL, EOMI	  Cardiovascular: Tachy S1 S2, JVP < 6 cm H2O, no M/R/G  Respiratory: Lungs clear to auscultation	  Gastrointestinal:  Soft, Non-tender, + BS	  Skin: No rashes, No ecchymoses	  Neurologic: Non-focal  Extremities: No clubbing, cyanosis or edema  Vascular: Peripheral pulses palpable bilaterally  Psychiatry: A & O x 3, Mood & affect appropriate        LABS:                        10.7   5.72  )-----------( 282      ( 08 Nov 2018 08:13 )             34.2     11-08    134<L>  |  103  |  36<H>  ----------------------------<  84  5.3   |  20<L>  |  1.59<H>    Ca    9.8      08 Nov 2018 06:11  Mg     2.1     11-07    TPro  7.2  /  Alb  4.2  /  TBili  0.8  /  DBili  x   /  AST  33  /  ALT  18  /  AlkPhos  90  11-08    Tacrolimus level 22 today  Tacrolumus level 13.5 yesterday (4 mg BID)    < from: Transthoracic Echocardiogram (09.05.18 @ 15:31) >  Dimensions:    Normal Values:  LA:            2.0 - 4.0 cm  Ao:            2.0 - 3.8 cm  SEPTUM:        0.6 - 1.2 cm  PWT:           0.6 - 1.1 cm  LVIDd:  5.1    3.0 - 5.6 cm  LVIDs:  3.6    1.8 - 4.0 cm  Fractional short: 29 %  EF (Teicholtz): 56 %    Observations:  Mitral Valve: Normal mitral valve. Mild mitral  regurgitation.  Aortic Valve/Aorta: Normal trileaflet aortic valve.  Normal aortic root.  Left Atrium: Normal left atrium.  Left Ventricle: Overall LV EF appears preserved. The basal  to mid inferior wall appears mildly hypokinetic.  Normal  left ventricular internal dimensions and wall thicknesses.  Right Heart: Normal right atrium. Normal right ventricular  size and function. Normal tricuspid valve. Normal pulmonic  valve.  Pericardium/Pleura: Normal pericardium with no pericardial  effusion.  Hemodynamic: Estimated right atrial pressure is 8 mm Hg.    < from: Cardiac Cath Lab - Adult (09.05.18 @ 12:58) >  HEMODYNAMIC TABLES  Pressures:  Baseline  Pressures:  - HR: 116  Pressures:  - Rhythm:  Pressures:  -- Pulmonary Artery (S/D/M): 35/14/23  Pressures:  -- Pulmonary Capillary Wedge: 9/7/5  Pressures:  -- Right Atrium (a/v/M): 8/8/4  Pressures:  -- Right Ventricle (s/edp): 35/4/--  O2 Sats:  Baseline  O2 Sats:  - HR: 116  O2 Sats:  - Rhythm:  O2 Sats:  -- AO: 6.7/95/8.66  O2 Sats:  -- PA: 6.7/60.7/5.53  O2 Sats:  -- PA: 7/59.9/5.7  Outputs:  -- OUTPUTS: CO by Marino: 7.89  Outputs:  -- OUTPUTS: Marino cardiac index: 4.28    SUMMARY:  Summary: Patient borderline hypotensive with low filling pressurs (RA 4,  PCWP 5) and elevated CO (8.0Lmin). Patient given bolus of IV fluid in the  lab. CHIEF COMPLAINT: "I feel fine."    HISTORY OF PRESENT ILLNESS:    Mr. Baez is a 68 year old man with past medical history of a nonischemic cardiomyopathy and chronic systolic heart failure s/p HM2 LVAD in June 2017 complicated by recurrent GI hemorrhage and possible pump thrombosis who underwent heart transplant on 2/23/18 with a hepatitis C positive donor. His course was complicated by bilateral IJ/subclavian thrombi, a persistent small right sided pleural effusion, as well as acute on chronic renal failure of unclear etiology. In addition, his post-operative course was complicated by graft dysfunction of unclear etiology and persistent C4d positive staining on endomyocardial biopsy with negative DSAs. He developed joshua evidence of graft dysfunction leading to treatment with plasmapheresis, IVIG, and rituximab.     In June, he was admitted with a left sided infiltrate, fevers, and chills. CT guided biopsy was unremarkable. He was treated empirically with antibiotics and discharged on voriconazole for presumed fungal pneumonia. He was readmitted on 8/14 through 8/29 and again in September with recurrent signs and symptoms of infection. CT scan showed a new right upper lobe infiltrate and he was ultimately diagnosed with Nocardia pneumonia and has been on treatment with Imipenem. He was also found to have evidence of C. diff and was treated with oral vancomycin.     He presents today for RHC with biopsy. He currently feels well. He was recently discharged from [facility] rehab and was transitioned from IV antibiotics to oral Bactrim for ongoing treatment of Nocardia. He was simulatneously started on Veltessa given prior episodes of hyperkalemia. He notes that he can walk without shortness of breath, but is primarily limited by ongoing chronic right knee pain. He has no PND or orthopnea and no cough. He denies fevers or chills. He has no abdominal pain, increase in abdominal girth or swelling in the legs. He has no diarrhea or rashes.     Allergies  No Known Allergies    MEDICATIONS  (STANDING):  aspirin enteric coated 81 milliGRAM(s) Oral daily  calcium citrate  1500 mG + Vitamin D (CITRACAL + D3 Maximum) 2 Tablet(s) Oral two times a day  docusate sodium 200 milliGRAM(s) Oral at bedtime  famotidine    Tablet 20 milliGRAM(s) Oral daily  magnesium oxide 400 milliGRAM(s) Oral three times a day with meals  multivitamin 1 Tablet(s) Oral daily  pravastatin 20 milliGRAM(s) Oral at bedtime  sodium bicarbonate 650 milliGRAM(s) Oral two times a day  sodium chloride 0.9% lock flush 3 milliLiter(s) IV Push every 8 hours  tacrolimus 3 milliGRAM(s) Oral <User Schedule>  vancomycin    Solution 125 milliGRAM(s) Oral <User Schedule>      PAST MEDICAL & SURGICAL HISTORY:  HLD (hyperlipidemia)  Former smoker  DVT of upper extremity (deep vein thrombosis)  Hepatitis C virus  GIB (gastrointestinal bleeding)  Ventricular fibrillation: s/p AICD  PAF (paroxysmal atrial fibrillation): on xarelto  Non-Ischemic Cardiomyopathy  SVT (Supraventricular Tachycardia)  HTN  CHF (Congestive Heart Failure)  S/P right heart catheterization: biopsy multiple  H/O heart transplant: 2/2018  Status post left hip replacement  History of Prior Ablation Treatment: for afib  AICD (Automatic Cardioverter/Defibrillator) Present: [employer] with 1 [employer] lead4/1/09- explanted and replaced with Medtronic 2 leads on 9/2/09    FAMILY HISTORY:  No pertinent family history in first degree relatives    SOCIAL HISTORY:    Denies tobacco, alcohol and illicit drug use.    REVIEW OF SYSTEMS:  14 point ROS done and found to be negative or noncontributory other than noted in HPI    Vital Signs Last 24 Hrs  T(C): 36.6 (08 Nov 2018 05:30), Max: 36.6 (08 Nov 2018 05:30)  T(F): 97.8 (08 Nov 2018 05:30), Max: 97.8 (08 Nov 2018 05:30)  HR: 109 (08 Nov 2018 05:30) (109 - 124)  BP: 129/90 (08 Nov 2018 05:30) (129/90 - 130/76)  RR: 16 (08 Nov 2018 05:30) (16 - 18)  SpO2: 97% (08 Nov 2018 05:30) (96% - 97%)    PHYSICAL EXAM:  Appearance: Normal, NAD   HEENT:   Normal oral mucosa, PERRL, EOMI	  Cardiovascular: Tachy S1 S2, JVP < 6 cm H2O, no M/R/G  Respiratory: Lungs clear to auscultation	  Gastrointestinal:  Soft, Non-tender, + BS	  Skin: No rashes, No ecchymoses	  Neurologic: Non-focal  Extremities: No clubbing, cyanosis or edema  Vascular: Peripheral pulses palpable bilaterally  Psychiatry: A & O x 3, Mood & affect appropriate        LABS:                        10.7   5.72  )-----------( 282      ( 08 Nov 2018 08:13 )             34.2     11-08    134<L>  |  103  |  36<H>  ----------------------------<  84  5.3   |  20<L>  |  1.59<H>    Ca    9.8      08 Nov 2018 06:11  Mg     2.1     11-07    TPro  7.2  /  Alb  4.2  /  TBili  0.8  /  DBili  x   /  AST  33  /  ALT  18  /  AlkPhos  90  11-08    Tacrolimus level 22 today  Tacrolumus level 13.5 yesterday (4 mg BID)    < from: Transthoracic Echocardiogram (09.05.18 @ 15:31) >  Dimensions:    Normal Values:  LA:            2.0 - 4.0 cm  Ao:            2.0 - 3.8 cm  SEPTUM:        0.6 - 1.2 cm  PWT:           0.6 - 1.1 cm  LVIDd:  5.1    3.0 - 5.6 cm  LVIDs:  3.6    1.8 - 4.0 cm  Fractional short: 29 %  EF (Teicholtz): 56 %    Observations:  Mitral Valve: Normal mitral valve. Mild mitral  regurgitation.  Aortic Valve/Aorta: Normal trileaflet aortic valve.  Normal aortic root.  Left Atrium: Normal left atrium.  Left Ventricle: Overall LV EF appears preserved. The basal  to mid inferior wall appears mildly hypokinetic.  Normal  left ventricular internal dimensions and wall thicknesses.  Right Heart: Normal right atrium. Normal right ventricular  size and function. Normal tricuspid valve. Normal pulmonic  valve.  Pericardium/Pleura: Normal pericardium with no pericardial  effusion.  Hemodynamic: Estimated right atrial pressure is 8 mm Hg.    < from: Cardiac Cath Lab - Adult (09.05.18 @ 12:58) >  HEMODYNAMIC TABLES  Pressures:  Baseline  Pressures:  - HR: 116  Pressures:  - Rhythm:  Pressures:  -- Pulmonary Artery (S/D/M): 35/14/23  Pressures:  -- Pulmonary Capillary Wedge: 9/7/5  Pressures:  -- Right Atrium (a/v/M): 8/8/4  Pressures:  -- Right Ventricle (s/edp): 35/4/--  O2 Sats:  Baseline  O2 Sats:  - HR: 116  O2 Sats:  - Rhythm:  O2 Sats:  -- AO: 6.7/95/8.66  O2 Sats:  -- PA: 6.7/60.7/5.53  O2 Sats:  -- PA: 7/59.9/5.7  Outputs:  -- OUTPUTS: CO by Marino: 7.89  Outputs:  -- OUTPUTS: Marino cardiac index: 4.28    SUMMARY:  Summary: Patient borderline hypotensive with low filling pressurs (RA 4,  PCWP 5) and elevated CO (8.0Lmin). Patient given bolus of IV fluid in the  lab. CHIEF COMPLAINT: "I feel fine."    HISTORY OF PRESENT ILLNESS:    Mr. Baez is a 68 year old man with past medical history of a nonischemic cardiomyopathy and chronic systolic heart failure s/p HM2 LVAD in June 2017 complicated by recurrent GI hemorrhage and possible pump thrombosis who underwent heart transplant on 2/23/18 with a hepatitis C positive donor. His course was complicated by bilateral IJ/subclavian thrombi, a persistent small right sided pleural effusion, as well as acute on chronic renal failure of unclear etiology. In addition, his post-operative course was complicated by graft dysfunction of unclear etiology and persistent C4d positive staining on endomyocardial biopsy with negative DSAs. He developed joshua evidence of graft dysfunction leading to treatment with plasmapheresis, IVIG, and rituximab.     In June, he was admitted with a left sided infiltrate, fevers, and chills. CT guided biopsy was unremarkable. He was treated empirically with antibiotics and discharged on voriconazole for presumed fungal pneumonia. He was readmitted on 8/14 through 8/29 and again in September with recurrent signs and symptoms of infection. CT scan showed a new right upper lobe infiltrate and he was ultimately diagnosed with Nocardia pneumonia and has been on treatment with Imipenem. He was also found to have evidence of C. diff and was treated with oral vancomycin.     He presents today for RHC with biopsy. He currently feels well. He was recently discharged from [facility] rehab and was transitioned from IV antibiotics to oral Bactrim for ongoing treatment of Nocardia. He was simulatneously started on Veltessa given prior episodes of hyperkalemia. He notes that he can walk without shortness of breath, but is primarily limited by ongoing chronic right knee pain. He has no PND or orthopnea and no cough. He denies fevers or chills. He has no abdominal pain, increase in abdominal girth or swelling in the legs. He has no diarrhea or rashes.     He currently feels well and is without complaints. He received Kayexalate last night with an improvement in potassium level. His scheduled RHC/EMB was deferred yesterday and will be resecheduled for next week on Wednesday.    Allergies  No Known Allergies    MEDICATIONS  (STANDING):  aspirin enteric coated 81 milliGRAM(s) Oral daily  calcium citrate  1500 mG + Vitamin D (CITRACAL + D3 Maximum) 2 Tablet(s) Oral two times a day  docusate sodium 200 milliGRAM(s) Oral at bedtime  famotidine    Tablet 20 milliGRAM(s) Oral daily  magnesium oxide 400 milliGRAM(s) Oral three times a day with meals  multivitamin 1 Tablet(s) Oral daily  pravastatin 20 milliGRAM(s) Oral at bedtime  sodium bicarbonate 650 milliGRAM(s) Oral two times a day  sodium chloride 0.9% lock flush 3 milliLiter(s) IV Push every 8 hours  tacrolimus 3 milliGRAM(s) Oral <User Schedule>  vancomycin    Solution 125 milliGRAM(s) Oral <User Schedule>      PAST MEDICAL & SURGICAL HISTORY:  HLD (hyperlipidemia)  Former smoker  DVT of upper extremity (deep vein thrombosis)  Hepatitis C virus  GIB (gastrointestinal bleeding)  Ventricular fibrillation: s/p AICD  PAF (paroxysmal atrial fibrillation): on xarelto  Non-Ischemic Cardiomyopathy  SVT (Supraventricular Tachycardia)  HTN  CHF (Congestive Heart Failure)  S/P right heart catheterization: biopsy multiple  H/O heart transplant: 2/2018  Status post left hip replacement  History of Prior Ablation Treatment: for afib  AICD (Automatic Cardioverter/Defibrillator) Present: [employer] with 1 [employer] lead4/1/09- explanted and replaced with Medtronic 2 leads on 9/2/09    FAMILY HISTORY:  No pertinent family history in first degree relatives    SOCIAL HISTORY:    Denies tobacco, alcohol and illicit drug use.    REVIEW OF SYSTEMS:  14 point ROS done and found to be negative or noncontributory other than noted in HPI    Vital Signs Last 24 Hrs  T(C): 36.6 (08 Nov 2018 05:30), Max: 36.6 (08 Nov 2018 05:30)  T(F): 97.8 (08 Nov 2018 05:30), Max: 97.8 (08 Nov 2018 05:30)  HR: 109 (08 Nov 2018 05:30) (109 - 124)  BP: 129/90 (08 Nov 2018 05:30) (129/90 - 130/76)  RR: 16 (08 Nov 2018 05:30) (16 - 18)  SpO2: 97% (08 Nov 2018 05:30) (96% - 97%)    PHYSICAL EXAM:  Appearance: Normal, NAD   HEENT:   Normal oral mucosa, PERRL, EOMI	  Cardiovascular: Tachy S1 S2, JVP < 6 cm H2O, no M/R/G  Respiratory: Lungs clear to auscultation	  Gastrointestinal:  Soft, Non-tender, + BS	  Skin: No rashes, No ecchymoses	  Neurologic: Non-focal  Extremities: No clubbing, cyanosis or edema  Vascular: Peripheral pulses palpable bilaterally  Psychiatry: A & O x 3, Mood & affect appropriate        LABS:                        10.7   5.72  )-----------( 282      ( 08 Nov 2018 08:13 )             34.2     11-08    134<L>  |  103  |  36<H>  ----------------------------<  84  5.3   |  20<L>  |  1.59<H>    Ca    9.8      08 Nov 2018 06:11  Mg     2.1     11-07    TPro  7.2  /  Alb  4.2  /  TBili  0.8  /  DBili  x   /  AST  33  /  ALT  18  /  AlkPhos  90  11-08    Tacrolimus level 22 today  Tacrolumus level 13.5 yesterday (4 mg BID)    < from: Transthoracic Echocardiogram (09.05.18 @ 15:31) >  Dimensions:    Normal Values:  LA:            2.0 - 4.0 cm  Ao:            2.0 - 3.8 cm  SEPTUM:        0.6 - 1.2 cm  PWT:           0.6 - 1.1 cm  LVIDd:  5.1    3.0 - 5.6 cm  LVIDs:  3.6    1.8 - 4.0 cm  Fractional short: 29 %  EF (Teicholtz): 56 %    Observations:  Mitral Valve: Normal mitral valve. Mild mitral  regurgitation.  Aortic Valve/Aorta: Normal trileaflet aortic valve.  Normal aortic root.  Left Atrium: Normal left atrium.  Left Ventricle: Overall LV EF appears preserved. The basal  to mid inferior wall appears mildly hypokinetic.  Normal  left ventricular internal dimensions and wall thicknesses.  Right Heart: Normal right atrium. Normal right ventricular  size and function. Normal tricuspid valve. Normal pulmonic  valve.  Pericardium/Pleura: Normal pericardium with no pericardial  effusion.  Hemodynamic: Estimated right atrial pressure is 8 mm Hg.    < from: Cardiac Cath Lab - Adult (09.05.18 @ 12:58) >  HEMODYNAMIC TABLES  Pressures:  Baseline  Pressures:  - HR: 116  Pressures:  - Rhythm:  Pressures:  -- Pulmonary Artery (S/D/M): 35/14/23  Pressures:  -- Pulmonary Capillary Wedge: 9/7/5  Pressures:  -- Right Atrium (a/v/M): 8/8/4  Pressures:  -- Right Ventricle (s/edp): 35/4/--  O2 Sats:  Baseline  O2 Sats:  - HR: 116  O2 Sats:  - Rhythm:  O2 Sats:  -- AO: 6.7/95/8.66  O2 Sats:  -- PA: 6.7/60.7/5.53  O2 Sats:  -- PA: 7/59.9/5.7  Outputs:  -- OUTPUTS: CO by Marino: 7.89  Outputs:  -- OUTPUTS: Marino cardiac index: 4.28    SUMMARY:  Summary: Patient borderline hypotensive with low filling pressurs (RA 4,  PCWP 5) and elevated CO (8.0Lmin). Patient given bolus of IV fluid in the  lab.

## 2018-11-08 NOTE — CONSULT NOTE ADULT - NSHPATTENDINGPLANDISCUSS_GEN_ALL_CORE
Multidisciplinary team including transplant coordinator, Dr. Parker, Dr. Stahl, and transplant pharmacy

## 2018-11-08 NOTE — CONSULT NOTE ADULT - PROBLEM SELECTOR RECOMMENDATION 3
- Continue PO vanc 125mg [id number]  - Continue Flagyl 500mg IV Q8 - Continue current Bactrim DS PO BID

## 2018-11-08 NOTE — CONSULT NOTE ADULT - PROBLEM SELECTOR RECOMMENDATION 9
- Appreciate ID input  - BCx, CT Chest/A/P with PO contrast, C. diff PCR, GI PCR, CMV to be sent  - Please consult GI to evaluate for colonoscopy with bx to assess for CMV colitis - Improved.  - Counseled on low K diet. Please provide information to him regarding foods to avoid.  - Increase Veltassa to 16.8 grams daily as we must continue the Bactrim for Nocardia.  - OK for discharge home today. Repeat labs on Monday morning.

## 2018-11-08 NOTE — DISCHARGE NOTE ADULT - MEDICATION SUMMARY - MEDICATIONS TO TAKE
I will START or STAY ON the medications listed below when I get home from the hospital:    aspirin 81 mg oral delayed release tablet  -- 1 tab(s) by mouth once a day  -- Indication: For Blood thinner     acetaminophen 325 mg oral tablet  -- 2 tab(s) by mouth every 6 hours, As needed, Temp greater or equal to 38C (100.4F), Moderate Pain (4 - 6)  -- Indication: For As needed for fever     sodium bicarbonate 650 mg oral tablet  -- 1 tab(s) by mouth 2 times a day  -- Indication: For Antiacid     pravastatin 20 mg oral tablet  -- 1 tab(s) by mouth once a day (at bedtime)  -- Indication: For Cholesterol     Veltassa 8.4 g oral powder for reconstitution  -- 1 dose(s) by mouth 2 times a day  -- Indication: For High potassium    vancomycin 125 mg oral capsule  -- 1 cap(s) by mouth Monday, Wednesday, and Friday  -- Indication: For C-dif    famotidine 20 mg oral tablet  -- 1 tab(s) by mouth once a day (at bedtime)  -- Indication: For Acid reflux     tacrolimus 1 mg oral capsule  -- 3 cap(s) by mouth to start on friday 11/9/18   -- Indication: For Heart transplant     docusate sodium 100 mg oral capsule  -- 2 cap(s) by mouth once a day (at bedtime)  -- Indication: For Stool Softenr as needed for constipation     magnesium oxide 400 mg (241.3 mg elemental magnesium) oral tablet  -- 1 tab(s) by mouth 3 times a day (with meals)  -- Indication: For vitamin and electrolytes supplement     Bactrim  mg-160 mg oral tablet  -- 1 tab(s) by mouth 2 times a day  -- Indication: For Heart transplant     Calcitrate with D 315 mg-250 intl units oral tablet  -- 2 tab(s) by mouth 2 times a day  -- Indication: For Vitamin / Mineral supplement     Multiple Vitamins oral tablet  -- 1 tab(s) by mouth once a day  -- Indication: For Vitamin Supplement

## 2018-11-08 NOTE — PHYSICAL EXAM
[General Appearance - Well Developed] : well developed [General Appearance - Well Nourished] : well nourished [Normal Conjunctiva] : the conjunctiva exhibited no abnormalities [Normal Oral Mucosa] : normal oral mucosa [Normal Oropharynx] : normal oropharynx [Respiration, Rhythm And Depth] : normal respiratory rhythm and effort [Auscultation Breath Sounds / Voice Sounds] : lungs were clear to auscultation bilaterally [Heart Rate And Rhythm] : heart rate and rhythm were normal [Heart Sounds] : normal S1 and S2 [Arterial Pulses Normal] : the arterial pulses were normal [Edema] : no peripheral edema present [Bowel Sounds] : normal bowel sounds [Abdomen Soft] : soft [Abdomen Tenderness] : non-tender [Abnormal Walk] : normal gait [Nail Clubbing] : no clubbing of the fingernails [Cyanosis, Localized] : no localized cyanosis [Skin Color & Pigmentation] : normal skin color and pigmentation [Skin Turgor] : normal skin turgor [] : no rash [Oriented To Time, Place, And Person] : oriented to person, place, and time [Impaired Insight] : insight and judgment were intact [No Anxiety] : not feeling anxious [FreeTextEntry1] : Regular, but tachycardic.

## 2018-11-08 NOTE — CONSULT NOTE ADULT - ASSESSMENT
67 yo M with history of NICM now s/p HM2 explant & heart transplantation on 2/23/18 (CMV D-/R+, Toxo D-/R+, HCV+ donor). 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 He has received therapy with IVIG, plamsapheresis, and rituximab. He has completed a course of Mayvret and is cured of the Hepatitis C. He was admitted for hyperkalemia likely due to Bactrim for Nocardia infection. 67 yo M with history of NICM s/p HM2 explant & heart transplantation on 2/23/18 (CMV D-/R+, Toxo D-/R+, HCV+ donor). 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 He has received therapy with IVIG, plamsapheresis, and rituximab. He has completed a course of Mayvret and is cured of the Hepatitis C. He was admitted for hyperkalemia likely due to Bactrim for Nocardia infection.

## 2018-11-08 NOTE — DISCHARGE NOTE ADULT - HOSPITAL COURSE
68 year old man PMH NICM HFrEF s/p HM2 LVAD 6/2017, who underwent heart transplant on 2/23/18 (CMV D-/R+ and Toxo D-/R+, Hep C+ heart) with post op graft dysfunction who underwent plasmapheresis and IVIG x2 as well as rituximab, with suspected fungal PNA diagnosed 6/2018 treated w voriconazole, recurrent PNA with new RUL infiltrate on CT scan 8/18, which improved on broad spectrum antibiotics. S/p lung biopsy 8/18. C diff toxin positive c/b colitis on last RHC w/biopsy noted to be febrile and swelling s/p hospitalization for PNA and JP continues on oral ABX and Vanco taper with Rt arm PICC line removed and presents to [facility]  for RHC and biopsy. Pt was found to be hyperkalemia noted on routine labs despite being on Veltassa 8.4 grams daily. He denies any symptoms and has overall felt very well. Cardiac cath biopsy cancelled and patient admitted for management on hyperkalemia.   Hospital Course:   11/7 Kayexalate 30 mg PO given x 1 à repeat K+.  11/8 VVS; Potassium level 5.3. Veltassa increased to twice a day.  Patient discharged home and plan to reschedule cardiac biopsy next week per Dr. Mccabe

## 2018-11-08 NOTE — DISCHARGE NOTE ADULT - PLAN OF CARE
Potassium level 4.0-4.5 Increase Veltassa 8.4 mg 2 Packets daily   Continue with current medication regimen   Follow up with HF next week at the clinic for Cardiac biopsy Continue with current medication regimen HOLD PROGRAF TONIGHT 11/8 AND RESTART 3 MG PO BID ON FRIDAY 11/9/18 FOLLOW UP WITH HEART FAILURE NP JOHN PAUL FOR PROGRAFT BLOOD WORK CARDIAC BIOPSY NEXT WEDNESDAY 11/14/18. [phone]

## 2018-11-08 NOTE — REASON FOR VISIT
[Heart Transplant] : heart transplant [Follow-Up - Clinic] : a clinic follow-up of [FreeTextEntry1] : immunosuppressed status; pneumonia

## 2018-11-08 NOTE — DISCUSSION/SUMMARY
[FreeTextEntry1] : - Hyperkalemia with worsening renal failure: We will admit for adjustment of hyperkalemia, which is likely related to Bactrim. We will increase the dose of Veltessa. When his potassium is at goal, we will send for RHC and biopsy. \par \par -Nocardia pneumonia: Continue current oral Bactrim per ID. \par \par - Immunosuppression:\par We will adjust Prograf to goal level 8-10. Level today was supratherapeutic. He remains off of prednisone and Cellcept. \par \par - CAV prophylaxis/hyperlipidemia: \par Continue ASA 81mg daily and pravachol 20 mg daily\par \par - Antibiotic prophylaxis:CMV (intermediate risk): \par He is currently off of CMV prophylaxis. \par PCP/Toxo (intermediate risk): Continue atovaquone 1500 mg daily.\par \par - Chronic hepatitis C infection: \par Currently treated with undetectable viral load. He will need routine monitoring per protocol. \par \par - Stress ulcer prophylaxis: Continue famotidine 20mg daily.\par \par - Osteoporosis prophylaxis: Continue calcium + vit D 2 tabs BID.\par \par - Hypomagnesemia: Continue magnesium oxide 800 mg daily.

## 2018-11-08 NOTE — DISCHARGE NOTE ADULT - CARE PROVIDERS DIRECT ADDRESSES
[email or web address]

## 2018-11-08 NOTE — DISCHARGE NOTE ADULT - CARE PROVIDER_API CALL
Annabelle Mccabe (MD; PhD), Adv Heart Fail Trnsplnt Cardio; Cardiovascular Disease; Internal Medicine  [address] [place] [address]  Phone: [phone]  Fax: [phone]    Ti Parker), Thoracic and Cardiac Surgery  [address] [place] [address]  Phone: [phone]  Fax: [phone]

## 2018-11-08 NOTE — DISCHARGE NOTE ADULT - CARE PLAN
Principal Discharge DX:	Hyperkalemia  Goal:	Potassium level 4.0-4.5  Assessment and plan of treatment:	Increase Veltassa 8.4 mg 2 Packets daily   Continue with current medication regimen   Follow up with HF next week at the clinic for Cardiac biopsy  Secondary Diagnosis:	Heart transplanted  Goal:	Continue with current medication regimen  Assessment and plan of treatment:	HOLD PROGRAF TONIGHT 11/8 AND RESTART 3 MG PO BID ON FRIDAY 11/9/18 FOLLOW UP WITH HEART FAILURE NP JOHN PAUL FOR PROGRAFT BLOOD WORK CARDIAC BIOPSY NEXT WEDNESDAY 11/14/18. [phone]

## 2018-11-08 NOTE — DISCHARGE NOTE ADULT - MEDICATION SUMMARY - MEDICATIONS TO STOP TAKING
I will STOP taking the medications listed below when I get home from the hospital:    Veltassa 8.4 g oral powder for reconstitution  -- 1 dose(s) by mouth once a day    tacrolimus 1 mg oral capsule  -- 4 cap(s) by mouth 2 times a day    famotidine 20 mg oral tablet  -- 1 tab(s) by mouth once a day (at bedtime)

## 2018-11-09 ENCOUNTER — INBOUND DOCUMENT (OUTPATIENT)
Age: 68
End: 2018-11-09

## 2018-11-12 LAB
ALBUMIN SERPL ELPH-MCNC: 4.1 G/DL
ALP BLD-CCNC: 95 U/L
ALT SERPL-CCNC: 19 U/L
ANION GAP SERPL CALC-SCNC: 12 MMOL/L
AST SERPL-CCNC: 29 U/L
BASOPHILS # BLD AUTO: 0.01 K/UL
BASOPHILS NFR BLD AUTO: 0.2 %
BILIRUB SERPL-MCNC: 0.7 MG/DL
BUN SERPL-MCNC: 32 MG/DL
CALCIUM SERPL-MCNC: 10.1 MG/DL
CHLORIDE SERPL-SCNC: 105 MMOL/L
CO2 SERPL-SCNC: 19 MMOL/L
CREAT SERPL-MCNC: 1.29 MG/DL
EOSINOPHIL # BLD AUTO: 0.15 K/UL
EOSINOPHIL NFR BLD AUTO: 2.5 %
GLUCOSE SERPL-MCNC: 84 MG/DL
HCT VFR BLD CALC: 37 %
HGB BLD-MCNC: 11.7 G/DL
IMM GRANULOCYTES NFR BLD AUTO: 0.5 %
LYMPHOCYTES # BLD AUTO: 1.96 K/UL
LYMPHOCYTES NFR BLD AUTO: 32.7 %
MAGNESIUM SERPL-MCNC: 2 MG/DL
MAN DIFF?: NORMAL
MCHC RBC-ENTMCNC: 27.4 PG
MCHC RBC-ENTMCNC: 31.6 GM/DL
MCV RBC AUTO: 86.7 FL
MONOCYTES # BLD AUTO: 0.95 K/UL
MONOCYTES NFR BLD AUTO: 15.8 %
NEUTROPHILS # BLD AUTO: 2.9 K/UL
NEUTROPHILS NFR BLD AUTO: 48.3 %
PLATELET # BLD AUTO: 294 K/UL
POTASSIUM SERPL-SCNC: 6.1 MMOL/L
PROT SERPL-MCNC: 7.3 G/DL
RBC # BLD: 4.27 M/UL
RBC # FLD: 19.8 %
SODIUM SERPL-SCNC: 136 MMOL/L
TACROLIMUS SERPL-MCNC: 10.4 NG/ML
WBC # FLD AUTO: 6 K/UL

## 2018-11-13 LAB
CMV DNA SPEC QL NAA+PROBE: NOT DETECTED IU/ML
CMVPCR LOG: NOT DETECTED LOGIU/ML

## 2018-11-13 RX ORDER — DOCUSATE SODIUM 100 MG
2 CAPSULE ORAL
Qty: 0 | Refills: 0 | COMMUNITY

## 2018-11-13 RX ORDER — PATIROMER 8.4 G/1
1 POWDER, FOR SUSPENSION ORAL
Qty: 0 | Refills: 0 | COMMUNITY

## 2018-11-14 ENCOUNTER — RESULT REVIEW (OUTPATIENT)
Age: 68
End: 2018-11-14

## 2018-11-14 ENCOUNTER — OUTPATIENT (OUTPATIENT)
Dept: OUTPATIENT SERVICES | Facility: HOSPITAL | Age: 68
LOS: 1 days | Discharge: HOME | End: 2018-11-14
Payer: MEDICARE

## 2018-11-14 ENCOUNTER — APPOINTMENT (OUTPATIENT)
Dept: CARDIOLOGY | Facility: CLINIC | Age: 68
End: 2018-11-14
Payer: MEDICARE

## 2018-11-14 VITALS
WEIGHT: 147.05 LBS | TEMPERATURE: 98 F | OXYGEN SATURATION: 98 % | DIASTOLIC BLOOD PRESSURE: 91 MMHG | SYSTOLIC BLOOD PRESSURE: 151 MMHG | HEIGHT: 68 IN | RESPIRATION RATE: 16 BRPM | HEART RATE: 117 BPM

## 2018-11-14 VITALS
HEART RATE: 112 BPM | OXYGEN SATURATION: 98 % | DIASTOLIC BLOOD PRESSURE: 91 MMHG | TEMPERATURE: 97.8 F | WEIGHT: 147 LBS | SYSTOLIC BLOOD PRESSURE: 151 MMHG | BODY MASS INDEX: 22.35 KG/M2

## 2018-11-14 DIAGNOSIS — Z48.21 ENCOUNTER FOR AFTERCARE FOLLOWING HEART TRANSPLANT: ICD-10-CM

## 2018-11-14 DIAGNOSIS — Z94.1 HEART TRANSPLANT STATUS: ICD-10-CM

## 2018-11-14 DIAGNOSIS — Z94.1 HEART TRANSPLANT STATUS: Chronic | ICD-10-CM

## 2018-11-14 DIAGNOSIS — Z98.890 OTHER SPECIFIED POSTPROCEDURAL STATES: Chronic | ICD-10-CM

## 2018-11-14 LAB
ALBUMIN SERPL ELPH-MCNC: 4.3 G/DL — SIGNIFICANT CHANGE UP (ref 3.3–5)
ALP SERPL-CCNC: 87 U/L — SIGNIFICANT CHANGE UP (ref 40–120)
ALT FLD-CCNC: 25 U/L — SIGNIFICANT CHANGE UP (ref 10–45)
ANION GAP SERPL CALC-SCNC: 12 MMOL/L — SIGNIFICANT CHANGE UP (ref 5–17)
ANION GAP SERPL CALC-SCNC: 13 MMOL/L — SIGNIFICANT CHANGE UP (ref 5–17)
ANION GAP SERPL CALC-SCNC: 15 MMOL/L — SIGNIFICANT CHANGE UP (ref 5–17)
AST SERPL-CCNC: 69 U/L — HIGH (ref 10–40)
BILIRUB SERPL-MCNC: 0.7 MG/DL — SIGNIFICANT CHANGE UP (ref 0.2–1.2)
BUN SERPL-MCNC: 29 MG/DL — HIGH (ref 7–23)
BUN SERPL-MCNC: 31 MG/DL — HIGH (ref 7–23)
BUN SERPL-MCNC: 31 MG/DL — HIGH (ref 7–23)
CALCIUM SERPL-MCNC: 10 MG/DL — SIGNIFICANT CHANGE UP (ref 8.4–10.5)
CALCIUM SERPL-MCNC: 10.2 MG/DL — SIGNIFICANT CHANGE UP (ref 8.4–10.5)
CALCIUM SERPL-MCNC: 9.9 MG/DL — SIGNIFICANT CHANGE UP (ref 8.4–10.5)
CHLORIDE SERPL-SCNC: 104 MMOL/L — SIGNIFICANT CHANGE UP (ref 96–108)
CHLORIDE SERPL-SCNC: 105 MMOL/L — SIGNIFICANT CHANGE UP (ref 96–108)
CHLORIDE SERPL-SCNC: 106 MMOL/L — SIGNIFICANT CHANGE UP (ref 96–108)
CO2 SERPL-SCNC: 17 MMOL/L — LOW (ref 22–31)
CO2 SERPL-SCNC: 18 MMOL/L — LOW (ref 22–31)
CO2 SERPL-SCNC: 18 MMOL/L — LOW (ref 22–31)
CREAT SERPL-MCNC: 1.51 MG/DL — HIGH (ref 0.5–1.3)
CREAT SERPL-MCNC: 1.53 MG/DL — HIGH (ref 0.5–1.3)
CREAT SERPL-MCNC: 1.55 MG/DL — HIGH (ref 0.5–1.3)
GLUCOSE SERPL-MCNC: 78 MG/DL — SIGNIFICANT CHANGE UP (ref 70–99)
GLUCOSE SERPL-MCNC: 82 MG/DL — SIGNIFICANT CHANGE UP (ref 70–99)
GLUCOSE SERPL-MCNC: 84 MG/DL — SIGNIFICANT CHANGE UP (ref 70–99)
POTASSIUM SERPL-MCNC: 4.9 MMOL/L — SIGNIFICANT CHANGE UP (ref 3.5–5.3)
POTASSIUM SERPL-MCNC: 5.8 MMOL/L — HIGH (ref 3.5–5.3)
POTASSIUM SERPL-MCNC: 8 MMOL/L — CRITICAL HIGH (ref 3.5–5.3)
POTASSIUM SERPL-SCNC: 4.9 MMOL/L — SIGNIFICANT CHANGE UP (ref 3.5–5.3)
POTASSIUM SERPL-SCNC: 5.8 MMOL/L — HIGH (ref 3.5–5.3)
POTASSIUM SERPL-SCNC: 8 MMOL/L — CRITICAL HIGH (ref 3.5–5.3)
PROT SERPL-MCNC: 7.7 G/DL — SIGNIFICANT CHANGE UP (ref 6–8.3)
SODIUM SERPL-SCNC: 135 MMOL/L — SIGNIFICANT CHANGE UP (ref 135–145)
SODIUM SERPL-SCNC: 136 MMOL/L — SIGNIFICANT CHANGE UP (ref 135–145)
SODIUM SERPL-SCNC: 137 MMOL/L — SIGNIFICANT CHANGE UP (ref 135–145)

## 2018-11-14 PROCEDURE — 88341 IMHCHEM/IMCYTCHM EA ADD ANTB: CPT | Mod: 26

## 2018-11-14 PROCEDURE — C1884: CPT

## 2018-11-14 PROCEDURE — 88342 IMHCHEM/IMCYTCHM 1ST ANTB: CPT | Mod: 26

## 2018-11-14 PROCEDURE — 88342 IMHCHEM/IMCYTCHM 1ST ANTB: CPT

## 2018-11-14 PROCEDURE — 80048 BASIC METABOLIC PNL TOTAL CA: CPT

## 2018-11-14 PROCEDURE — 99203 OFFICE O/P NEW LOW 30 MIN: CPT

## 2018-11-14 PROCEDURE — 76937 US GUIDE VASCULAR ACCESS: CPT

## 2018-11-14 PROCEDURE — 93010 ELECTROCARDIOGRAM REPORT: CPT

## 2018-11-14 PROCEDURE — 99152 MOD SED SAME PHYS/QHP 5/>YRS: CPT | Mod: GC

## 2018-11-14 PROCEDURE — C1894: CPT

## 2018-11-14 PROCEDURE — 99153 MOD SED SAME PHYS/QHP EA: CPT

## 2018-11-14 PROCEDURE — 76937 US GUIDE VASCULAR ACCESS: CPT | Mod: 26,GC

## 2018-11-14 PROCEDURE — 82962 GLUCOSE BLOOD TEST: CPT

## 2018-11-14 PROCEDURE — 93505 ENDOMYOCARDIAL BIOPSY: CPT

## 2018-11-14 PROCEDURE — 99152 MOD SED SAME PHYS/QHP 5/>YRS: CPT

## 2018-11-14 PROCEDURE — 93005 ELECTROCARDIOGRAM TRACING: CPT

## 2018-11-14 PROCEDURE — 93306 TTE W/DOPPLER COMPLETE: CPT | Mod: 26

## 2018-11-14 PROCEDURE — 88307 TISSUE EXAM BY PATHOLOGIST: CPT | Mod: 26

## 2018-11-14 PROCEDURE — 88307 TISSUE EXAM BY PATHOLOGIST: CPT

## 2018-11-14 PROCEDURE — C1817: CPT

## 2018-11-14 PROCEDURE — 93306 TTE W/DOPPLER COMPLETE: CPT

## 2018-11-14 PROCEDURE — 93505 ENDOMYOCARDIAL BIOPSY: CPT | Mod: 26,GC

## 2018-11-14 PROCEDURE — 88346 IMFLUOR 1ST 1ANTB STAIN PX: CPT

## 2018-11-14 PROCEDURE — 80053 COMPREHEN METABOLIC PANEL: CPT

## 2018-11-14 PROCEDURE — 88346 IMFLUOR 1ST 1ANTB STAIN PX: CPT | Mod: 26

## 2018-11-14 PROCEDURE — 88341 IMHCHEM/IMCYTCHM EA ADD ANTB: CPT

## 2018-11-14 PROCEDURE — 99215 OFFICE O/P EST HI 40 MIN: CPT

## 2018-11-14 PROCEDURE — C1769: CPT

## 2018-11-14 RX ORDER — DEXTROSE 50 % IN WATER 50 %
25 SYRINGE (ML) INTRAVENOUS ONCE
Qty: 0 | Refills: 0 | Status: DISCONTINUED | OUTPATIENT
Start: 2018-11-14 | End: 2018-11-29

## 2018-11-14 RX ORDER — DEXTROSE 50 % IN WATER 50 %
12.5 SYRINGE (ML) INTRAVENOUS ONCE
Qty: 0 | Refills: 0 | Status: DISCONTINUED | OUTPATIENT
Start: 2018-11-14 | End: 2018-11-29

## 2018-11-14 RX ORDER — INSULIN HUMAN 100 [IU]/ML
10 INJECTION, SOLUTION SUBCUTANEOUS ONCE
Qty: 0 | Refills: 0 | Status: COMPLETED | OUTPATIENT
Start: 2018-11-14 | End: 2018-11-14

## 2018-11-14 RX ORDER — SODIUM CHLORIDE 9 MG/ML
3 INJECTION INTRAMUSCULAR; INTRAVENOUS; SUBCUTANEOUS EVERY 8 HOURS
Qty: 0 | Refills: 0 | Status: DISCONTINUED | OUTPATIENT
Start: 2018-11-14 | End: 2018-11-29

## 2018-11-14 RX ORDER — SODIUM CHLORIDE 9 MG/ML
1000 INJECTION, SOLUTION INTRAVENOUS
Qty: 0 | Refills: 0 | Status: DISCONTINUED | OUTPATIENT
Start: 2018-11-14 | End: 2018-11-29

## 2018-11-14 RX ORDER — DEXTROSE 50 % IN WATER 50 %
50 SYRINGE (ML) INTRAVENOUS ONCE
Qty: 0 | Refills: 0 | Status: COMPLETED | OUTPATIENT
Start: 2018-11-14 | End: 2018-11-14

## 2018-11-14 RX ORDER — DEXTROSE 50 % IN WATER 50 %
15 SYRINGE (ML) INTRAVENOUS ONCE
Qty: 0 | Refills: 0 | Status: DISCONTINUED | OUTPATIENT
Start: 2018-11-14 | End: 2018-11-29

## 2018-11-14 RX ORDER — PATIROMER 8.4 G/1
1 POWDER, FOR SUSPENSION ORAL
Qty: 0 | Refills: 0 | COMMUNITY

## 2018-11-14 RX ORDER — GLUCAGON INJECTION, SOLUTION 0.5 MG/.1ML
1 INJECTION, SOLUTION SUBCUTANEOUS ONCE
Qty: 0 | Refills: 0 | Status: DISCONTINUED | OUTPATIENT
Start: 2018-11-14 | End: 2018-11-29

## 2018-11-14 RX ORDER — SODIUM CHLORIDE 9 MG/ML
1000 INJECTION INTRAMUSCULAR; INTRAVENOUS; SUBCUTANEOUS
Qty: 0 | Refills: 0 | Status: DISCONTINUED | OUTPATIENT
Start: 2018-11-14 | End: 2018-11-29

## 2018-11-14 RX ADMIN — Medication 50 MILLILITER(S): at 11:48

## 2018-11-14 RX ADMIN — INSULIN HUMAN 10 UNIT(S): 100 INJECTION, SOLUTION SUBCUTANEOUS at 11:47

## 2018-11-14 NOTE — ASSESSMENT
[FreeTextEntry1] : Mr. Baez is a 68 year old man with history of NICM, s/p heart transplantation on 2/23/18. 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 He preiously received therapy with IVIG, plasmapheresis, and rituximab with course complicated by development of Nocardia pulmonary infection, for which he is currently on therapy. He is hyperkalemic today, with evidence of worsening renal function. His tacrolimus level is slightly high at 10.4.

## 2018-11-14 NOTE — CHART NOTE - NSCHARTNOTEFT_GEN_A_CORE
Pre- cath, patient is hyperkalemia (K+ 8), repeat was 6. Hyperkalemia treated with IV insulin and D50. Follow up FS was 50, hypoglycemia protocol initiated. 1 amp D50 and IV fluid D5 infusion running continuously until patient goes into procedure room for RHC. Repeat FS 68, patient asymptomatic.

## 2018-11-14 NOTE — HISTORY OF PRESENT ILLNESS
[FreeTextEntry1] : Mr. Baez is a 68 year old man with past medical history of a nonischemic cardiomyopathy and chronic systolic heart failure s/p HM2 LVAD in June 2017 complicated by recurrent GI hemorrhage and possible pump thrombosis who underwent heart transplant on 2/23/18 with a hepatitis C positive donor. His course was complicated by bilateral IJ/subclavian thrombi, a persistent small right sided pleural effusion, as well as acute on chronic renal failure of unclear etiology. In addition, his post-operative course was complicated by graft dysfunction of unclear etiology and persistent C4d positive staining on endomyocardial biopsy with negative DSAs. He developed joshua evidence of graft dysfunction leading to treatment with plasmapheresis, IVIG, and rituximab. \par \par In June, he was admitted with a left sided infiltrate, fevers, and chills. CT guided biopsy was unremarkable. He was treated empirically with antibiotics and discharged on voriconazole for presumed fungal pneumonia. He was readmitted on 8/14 through 8/29 and again in September with recurrent signs and symptoms of infection. CT scan showed a new right upper lobe infiltrate and he was ultimately diagnosed with Nocardia pneumonia and has been on treatment with Imipenem. He was also found to have evidence of C. diff and was treated with oral vancomycin. \par \par He presents today for RHC with biopsy. He currently feels well. He continues on oral Bactrim for ongoing treatment of Nocardia. He was started on Veltessa given prior episodes of hyperkalemia. He notes that he can walk without shortness of breath, but is primarily limited by ongoing chronic right knee pain. He has no PND or orthopnea and no cough. He denies fevers or chills. He has no abdominal pain, increase in abdominal girth or swelling in the legs. He has no diarrhea or rashes.

## 2018-11-14 NOTE — PHYSICAL EXAM
[Normal Conjunctiva] : the conjunctiva exhibited no abnormalities [Normal Oral Mucosa] : normal oral mucosa [Normal Oropharynx] : normal oropharynx [Respiration, Rhythm And Depth] : normal respiratory rhythm and effort [Auscultation Breath Sounds / Voice Sounds] : lungs were clear to auscultation bilaterally [Heart Rate And Rhythm] : heart rate and rhythm were normal [Heart Sounds] : normal S1 and S2 [Arterial Pulses Normal] : the arterial pulses were normal [Edema] : no peripheral edema present [Bowel Sounds] : normal bowel sounds [Abdomen Soft] : soft [Abdomen Tenderness] : non-tender [Abnormal Walk] : normal gait [Skin Color & Pigmentation] : normal skin color and pigmentation [Skin Turgor] : normal skin turgor [] : no rash [Oriented To Time, Place, And Person] : oriented to person, place, and time [Impaired Insight] : insight and judgment were intact [No Anxiety] : not feeling anxious [General Appearance - Well Developed] : well developed [General Appearance - Well Nourished] : well nourished [FreeTextEntry1] : Regular, but tachycardic. III/VI diastolic murmur.  [Nail Clubbing] : no clubbing of the fingernails [Cyanosis, Localized] : no localized cyanosis

## 2018-11-14 NOTE — H&P CARDIOLOGY - HISTORY OF PRESENT ILLNESS
Mr. Baez is a 68 year old man with past medical history of a nonischemic cardiomyopathy and chronic systolic heart failure s/p HM2 LVAD in June 2017 complicated by recurrent GI hemorrhage and possible pump thrombosis who underwent heart transplant on 2/23/18 with a hepatitis C positive donor. His course was complicated by bilateral IJ/subclavian thrombi, a persistent small right sided pleural effusion, as well as acute on chronic renal failure of unclear etiology. In addition, his post-operative course was complicated by graft dysfunction of unclear etiology and persistent C4d positive staining on endomyocardial biopsy with negative DSAs. He developed joshua evidence of graft dysfunction leading to treatment with plasmapheresis, IVIG, and rituximab.     In June, he was admitted with a left sided infiltrate, fevers, and chills. CT guided biopsy was unremarkable. He was treated empirically with antibiotics and discharged on voriconazole for presumed fungal pneumonia. He was readmitted on 8/14 through 8/29 and again in September with recurrent signs and symptoms of infection. CT scan showed a new right upper lobe infiltrate and he was ultimately diagnosed with Nocardia pneumonia and has been on treatment with Imipenem. He was also found to have evidence of C. diff and was treated with oral vancomycin.     -Discharged from [facility] rehab and was transitioned from IV antibiotics to oral Bactrim for ongoing treatment of Nocardia. He was simultaneously started on Veltessa given prior episodes of hyperkalemia. He notes that he can walk without shortness of breath, but is primarily limited by ongoing chronic right knee pain.   - He was previously scheduled for RHC however deferrred due to hyperkalemia. He was admitted and treated- He received Kayexalate with an improvement in potassium level.   - Here today for RHC .He denies PND or orthopnea and no cough. He denies fevers or chills. He has no abdominal pain, increase in abdominal girth or swelling in the legs. He has no diarrhea or rashes. Mr. Baez is a 68 year old man with past medical history of a nonischemic cardiomyopathy and chronic systolic heart failure s/p HM2 LVAD in June 2017 complicated by recurrent GI hemorrhage and possible pump thrombosis who underwent heart transplant on 2/23/18 (CMV D-/R+ and Toxo D-/R+, Hep C+ heart). His course was complicated by bilateral IJ/subclavian thrombi, a persistent small right sided pleural effusion, as well as acute on chronic renal failure of unclear etiology. In addition, his post-operative course was complicated by graft dysfunction of unclear etiology and persistent C4d positive staining on endomyocardial biopsy with negative DSAs. He developed joshua evidence of graft dysfunction leading to treatment with plasmapheresis, IVIG, and rituximab.     In June, he was admitted with a left sided infiltrate, fevers, and chills. CT guided biopsy was unremarkable. He was treated empirically with antibiotics and discharged on voriconazole for presumed fungal pneumonia. He was readmitted on 8/14 through 8/29 and again in September with recurrent signs and symptoms of infection. CT scan showed a new right upper lobe infiltrate and he was ultimately diagnosed with Nocardia pneumonia and has been on treatment with Imipenem. He was also found to have evidence of C. diff and was treated with oral vancomycin. C diff toxin positive c/b colitis on last RHC w/biopsy noted to be febrile and swelling s/p hospitalization for PNA and JP continues on oral ABX and Vanco taper with Rt arm PICC line removed 11/7/18    -Discharged from [facility] rehab and was transitioned from IV antibiotics to oral Bactrim for ongoing treatment of Nocardia. He was simultaneously started on Veltessa given prior episodes of hyperkalemia. He notes that he can walk without shortness of breath, but is primarily limited by ongoing chronic right knee pain.   - He was previously scheduled for RHC however deferrred due to hyperkalemia. He was admitted and treated- He received Kayexalate with an improvement in potassium level.   - Here today for RHC .He denies PND or orthopnea and no cough. He denies fevers or chills. He has no abdominal pain, increase in abdominal girth or swelling in the legs. He has no diarrhea or rashes.

## 2018-11-14 NOTE — DISCUSSION/SUMMARY
[FreeTextEntry1] : - Immunosuppression:\par We will continue him on current dose of Prograf. His level last week was just over 10. Goal dose Prograf  level 8-10. He remains off of prednisone and Cellcept. \par \par - S/P Heart transplantation: He will have RHC with biopsy today. No evidence of graft dysfunction or congestive heart failure on exam. 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 \par \par - Hyperkalemia with CKD: He continues on increased dose of Veltessa. 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 Hutchins the use of fludrocortisone. He is sodium bicarbonate 650 mg PO BID. \par \par - Antibiotic prophylaxis:CMV (intermediate risk): We resumed valgancyclovir given low levels of CMV on PCR. Last level was undetectable. We will discuss Valcyte dosing with ID. \par PCP/Toxo (intermediate risk): He is currently on Bactrim. \par \par -Nocardia pneumonia: Continue current oral Bactrim per ID. \par \par - C. diff colitis: He continues on oral vancomycin, which will be completed on 11/28. \par \par - CAV prophylaxis/hyperlipidemia: \par Continue ASA 81mg daily and pravachol 20 mg daily\par \par - Chronic hepatitis C infection: \par Currently treated with undetectable viral load. He will need routine monitoring per protocol. \par \par - Stress ulcer prophylaxis: Continue famotidine 20mg daily.\par \par - Osteoporosis prophylaxis: Continue calcium + vit D 2 tabs BID.\par \par - Hypomagnesemia: Continue magnesium oxide 1200 mg daily.

## 2018-11-14 NOTE — REASON FOR VISIT
[Follow-Up - Clinic] : a clinic follow-up of [Heart Transplant] : heart transplant [FreeTextEntry1] : immunosuppressed status; pneumonia

## 2018-11-17 ENCOUNTER — OTHER (OUTPATIENT)
Age: 68
End: 2018-11-17

## 2018-11-20 LAB
BASOPHILS # BLD AUTO: 0.01 K/UL
BASOPHILS NFR BLD AUTO: 0.2 %
EOSINOPHIL # BLD AUTO: 0.1 K/UL
EOSINOPHIL NFR BLD AUTO: 2.2 %
HCT VFR BLD CALC: 35.4 %
HGB BLD-MCNC: 11 G/DL
IMM GRANULOCYTES NFR BLD AUTO: 0.9 %
LYMPHOCYTES # BLD AUTO: 1.56 K/UL
LYMPHOCYTES NFR BLD AUTO: 34.4 %
MAGNESIUM SERPL-MCNC: 1.8 MG/DL
MAN DIFF?: NORMAL
MCHC RBC-ENTMCNC: 26.3 PG
MCHC RBC-ENTMCNC: 31.1 GM/DL
MCV RBC AUTO: 84.7 FL
MONOCYTES # BLD AUTO: 0.63 K/UL
MONOCYTES NFR BLD AUTO: 13.9 %
NEUTROPHILS # BLD AUTO: 2.2 K/UL
NEUTROPHILS NFR BLD AUTO: 48.4 %
PLATELET # BLD AUTO: 239 K/UL
RBC # BLD: 4.18 M/UL
RBC # FLD: 19.8 %
TACROLIMUS SERPL-MCNC: 4.9 NG/ML
WBC # FLD AUTO: 4.54 K/UL

## 2018-11-21 LAB
CMV DNA SPEC QL NAA+PROBE: NOT DETECTED IU/ML
CMVPCR LOG: NOT DETECTED LOGIU/ML

## 2018-11-23 ENCOUNTER — OTHER (OUTPATIENT)
Age: 68
End: 2018-11-23

## 2018-11-27 RX ORDER — PATIROMER 16.8 G/1
16.8 POWDER, FOR SUSPENSION ORAL DAILY
Qty: 30 | Refills: 0 | Status: COMPLETED | COMMUNITY
Start: 2018-10-19 | End: 2018-11-27

## 2018-11-28 ENCOUNTER — NON-APPOINTMENT (OUTPATIENT)
Age: 68
End: 2018-11-28

## 2018-11-28 ENCOUNTER — OUTPATIENT (OUTPATIENT)
Dept: OUTPATIENT SERVICES | Facility: HOSPITAL | Age: 68
LOS: 1 days | End: 2018-11-28
Payer: MEDICARE

## 2018-11-28 ENCOUNTER — APPOINTMENT (OUTPATIENT)
Dept: CARDIOLOGY | Facility: CLINIC | Age: 68
End: 2018-11-28
Payer: MEDICARE

## 2018-11-28 VITALS
RESPIRATION RATE: 18 BRPM | HEART RATE: 118 BPM | HEIGHT: 68 IN | DIASTOLIC BLOOD PRESSURE: 80 MMHG | WEIGHT: 154 LBS | SYSTOLIC BLOOD PRESSURE: 151 MMHG | BODY MASS INDEX: 23.34 KG/M2

## 2018-11-28 DIAGNOSIS — A43.9 NOCARDIOSIS, UNSPECIFIED: ICD-10-CM

## 2018-11-28 DIAGNOSIS — Z98.890 OTHER SPECIFIED POSTPROCEDURAL STATES: Chronic | ICD-10-CM

## 2018-11-28 DIAGNOSIS — Z94.1 HEART TRANSPLANT STATUS: Chronic | ICD-10-CM

## 2018-11-28 LAB
ALBUMIN SERPL ELPH-MCNC: 4.1 G/DL
ALP BLD-CCNC: 97 U/L
ALT SERPL-CCNC: 32 U/L
ANION GAP SERPL CALC-SCNC: 12 MMOL/L
AST SERPL-CCNC: 35 U/L
BASOPHILS # BLD AUTO: 0.01 K/UL
BASOPHILS NFR BLD AUTO: 0.2 %
BILIRUB SERPL-MCNC: 0.7 MG/DL
BUN SERPL-MCNC: 26 MG/DL
CALCIUM SERPL-MCNC: 9.9 MG/DL
CHLORIDE SERPL-SCNC: 106 MMOL/L
CO2 SERPL-SCNC: 20 MMOL/L
CREAT SERPL-MCNC: 1.61 MG/DL
EOSINOPHIL # BLD AUTO: 0.08 K/UL
EOSINOPHIL NFR BLD AUTO: 1.7 %
GLUCOSE SERPL-MCNC: 77 MG/DL
HCT VFR BLD CALC: 34.8 %
HGB BLD-MCNC: 10.9 G/DL
IMM GRANULOCYTES NFR BLD AUTO: 0.2 %
LYMPHOCYTES # BLD AUTO: 1.45 K/UL
LYMPHOCYTES NFR BLD AUTO: 30.5 %
MAGNESIUM SERPL-MCNC: 1.7 MG/DL
MAN DIFF?: NORMAL
MCHC RBC-ENTMCNC: 26.5 PG
MCHC RBC-ENTMCNC: 31.3 GM/DL
MCV RBC AUTO: 84.5 FL
MONOCYTES # BLD AUTO: 0.56 K/UL
MONOCYTES NFR BLD AUTO: 11.8 %
NEUTROPHILS # BLD AUTO: 2.65 K/UL
NEUTROPHILS NFR BLD AUTO: 55.6 %
PLATELET # BLD AUTO: 245 K/UL
POTASSIUM SERPL-SCNC: 5.2 MMOL/L
PROT SERPL-MCNC: 7.2 G/DL
RBC # BLD: 4.12 M/UL
RBC # FLD: 19.6 %
SODIUM SERPL-SCNC: 138 MMOL/L
TACROLIMUS SERPL-MCNC: 6.2 NG/ML
WBC # FLD AUTO: 4.76 K/UL

## 2018-11-28 PROCEDURE — 71250 CT THORAX DX C-: CPT | Mod: 26

## 2018-11-28 PROCEDURE — 71250 CT THORAX DX C-: CPT

## 2018-11-28 PROCEDURE — 99214 OFFICE O/P EST MOD 30 MIN: CPT

## 2018-11-30 ENCOUNTER — APPOINTMENT (OUTPATIENT)
Dept: NEPHROLOGY | Facility: CLINIC | Age: 68
End: 2018-11-30
Payer: MEDICARE

## 2018-11-30 VITALS
OXYGEN SATURATION: 95 % | HEIGHT: 68 IN | SYSTOLIC BLOOD PRESSURE: 133 MMHG | DIASTOLIC BLOOD PRESSURE: 88 MMHG | BODY MASS INDEX: 23.39 KG/M2 | WEIGHT: 154.32 LBS | HEART RATE: 124 BPM

## 2018-11-30 LAB
CMV DNA SPEC QL NAA+PROBE: NOT DETECTED IU/ML
CMVPCR LOG: NOT DETECTED LOGIU/ML
POTASSIUM SERPL-SCNC: 5.3 MMOL/L

## 2018-11-30 PROCEDURE — 99214 OFFICE O/P EST MOD 30 MIN: CPT

## 2018-11-30 NOTE — ASSESSMENT
[FreeTextEntry1] : Mr. Baez is a 68 year old man with history of NICM, s/p heart transplantation on 2/23/18. 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 He previously received therapy with IVIG, plasmapheresis, and rituximab with course complicated by development of Nocardia pulmonary infection, for which he is currently on therapy.

## 2018-11-30 NOTE — HISTORY OF PRESENT ILLNESS
[FreeTextEntry1] : Mr. Baez is a 68 year old man with past medical history of a nonischemic cardiomyopathy and chronic systolic heart failure s/p HM2 LVAD in June 2017 complicated by recurrent GI hemorrhage and possible pump thrombosis who underwent heart transplant on 2/23/18 with a hepatitis C positive donor. His course was complicated by bilateral IJ/subclavian thrombi, a persistent small right sided pleural effusion, as well as acute on chronic renal failure of unclear etiology. In addition, his post-operative course was complicated by graft dysfunction of unclear etiology and persistent C4d positive staining on endomyocardial biopsy with negative DSAs. He developed joshua evidence of graft dysfunction leading to treatment with plasmapheresis, IVIG, and rituximab. \par \par In June, he was admitted with a left sided infiltrate, fevers, and chills. CT guided biopsy was unremarkable. He was treated empirically with antibiotics and discharged on voriconazole for presumed fungal pneumonia. He was readmitted on 8/14 through 8/29 and again in September with recurrent signs and symptoms of infection. CT scan showed a new right upper lobe infiltrate and he was ultimately diagnosed with Nocardia pneumonia and has been on treatment with Imipenem. He was also found to have evidence of C. diff and was treated with oral vancomycin. \par \par He presents today for a routine follow up looking feeling well. Labs from 11/26 with noted K of 6.6, he was took Kayexalate and had repeat labs today.  Per patient he "feels great". He continues on oral Bactrim for ongoing treatment of Nocardia, with addition of Veltassa for prevention of hyperkalemia, which has now been increased to the highest dose of 25mg. He notes that he can walk without shortness of breath, but is primarily limited by ongoing chronic right knee pain, uses a cane or walker. He has no PND or orthopnea and no cough. He denies fevers or chills. He has no abdominal pain, increase in abdominal girth or swelling in the legs. He has no diarrhea or rashes. He is eating and sleeping well.  He has no new complaints or issues.\par K today 5.2

## 2018-11-30 NOTE — PHYSICAL EXAM
[General Appearance - Well Developed] : well developed [General Appearance - Well Nourished] : well nourished [Normal Conjunctiva] : the conjunctiva exhibited no abnormalities [Normal Oral Mucosa] : normal oral mucosa [Normal Oropharynx] : normal oropharynx [Respiration, Rhythm And Depth] : normal respiratory rhythm and effort [Auscultation Breath Sounds / Voice Sounds] : lungs were clear to auscultation bilaterally [Heart Rate And Rhythm] : heart rate and rhythm were normal [Heart Sounds] : normal S1 and S2 [Arterial Pulses Normal] : the arterial pulses were normal [Edema] : no peripheral edema present [Bowel Sounds] : normal bowel sounds [Abdomen Soft] : soft [Abdomen Tenderness] : non-tender [Abnormal Walk] : normal gait [Nail Clubbing] : no clubbing of the fingernails [Cyanosis, Localized] : no localized cyanosis [Skin Color & Pigmentation] : normal skin color and pigmentation [Skin Turgor] : normal skin turgor [] : no rash [Oriented To Time, Place, And Person] : oriented to person, place, and time [Impaired Insight] : insight and judgment were intact [No Anxiety] : not feeling anxious [FreeTextEntry1] : Regular, but tachycardic. III/VI diastolic murmur.

## 2018-12-02 LAB
ALDOSTERONE SERUM: 8.2 NG/DL
CREAT SPEC-SCNC: 198 MG/DL
OSMOLALITY SERPL: 303 MOS/KG
POTASSIUM UR-SCNC: 34 MMOL/L
SODIUM ?TM SUB UR QN: 155 MMOL/L

## 2018-12-03 LAB — OSMOLALITY UR: 723 MOS/KG

## 2018-12-04 ENCOUNTER — OTHER (OUTPATIENT)
Age: 68
End: 2018-12-04

## 2018-12-04 LAB
ALBUMIN SERPL ELPH-MCNC: 4.1 G/DL
ALP BLD-CCNC: 96 U/L
ALT SERPL-CCNC: 21 U/L
ANION GAP SERPL CALC-SCNC: 9 MMOL/L
AST SERPL-CCNC: 32 U/L
BASOPHILS # BLD AUTO: 0.01 K/UL
BASOPHILS NFR BLD AUTO: 0.3 %
BILIRUB SERPL-MCNC: 0.6 MG/DL
BUN SERPL-MCNC: 33 MG/DL
CALCIUM SERPL-MCNC: 9.8 MG/DL
CHLORIDE SERPL-SCNC: 107 MMOL/L
CO2 SERPL-SCNC: 21 MMOL/L
CREAT SERPL-MCNC: 1.73 MG/DL
EOSINOPHIL # BLD AUTO: 0.05 K/UL
EOSINOPHIL NFR BLD AUTO: 1.4 %
GLUCOSE SERPL-MCNC: 87 MG/DL
HCT VFR BLD CALC: 31.4 %
HGB BLD-MCNC: 10 G/DL
IMM GRANULOCYTES NFR BLD AUTO: 1.6 %
LYMPHOCYTES # BLD AUTO: 1.23 K/UL
LYMPHOCYTES NFR BLD AUTO: 33.7 %
MAGNESIUM SERPL-MCNC: 1.9 MG/DL
MAN DIFF?: NORMAL
MCHC RBC-ENTMCNC: 26.5 PG
MCHC RBC-ENTMCNC: 31.8 GM/DL
MCV RBC AUTO: 83.3 FL
MONOCYTES # BLD AUTO: 0.5 K/UL
MONOCYTES NFR BLD AUTO: 13.7 %
NEUTROPHILS # BLD AUTO: 1.8 K/UL
NEUTROPHILS NFR BLD AUTO: 49.3 %
PLATELET # BLD AUTO: 245 K/UL
POTASSIUM SERPL-SCNC: 6.1 MMOL/L
PROT SERPL-MCNC: 7.3 G/DL
RBC # BLD: 3.77 M/UL
RBC # FLD: 19.2 %
SODIUM SERPL-SCNC: 137 MMOL/L
TACROLIMUS SERPL-MCNC: 9 NG/ML
WBC # FLD AUTO: 3.65 K/UL

## 2018-12-04 RX ORDER — ALLOPURINOL 100 MG/1
100 TABLET ORAL
Qty: 30 | Refills: 0 | Status: COMPLETED | COMMUNITY
Start: 2017-12-28

## 2018-12-05 LAB — RENIN ACTIVITY, PLASMA: 0.87 NG/ML/HR

## 2018-12-05 RX ORDER — VANCOMYCIN HYDROCHLORIDE 125 MG/1
125 CAPSULE ORAL
Refills: 0 | Status: COMPLETED | COMMUNITY
Start: 2018-09-03 | End: 2018-12-05

## 2018-12-06 LAB
CMV DNA SPEC QL NAA+PROBE: NOT DETECTED IU/ML
CMVPCR LOG: NOT DETECTED LOGIU/ML

## 2018-12-09 NOTE — DIETITIAN INITIAL EVALUATION ADULT. - SIGNS/SYMPTOMS
Progress Notes by Gowers, Molly at 01/27/17 04:09 PM     Author:  Gowers, Molly Service:  (none) Author Type:  Patient      Filed:  01/27/17 04:13 PM Encounter Date:  12/30/2016 Status:  Signed     :  Gowers, Molly (Patient )            Terri called authorizing 6 additional visits (12 in total starting from 1/3/17.) please fax notes weekly to [phone]    Reference claim number on cover sheet :[MG1.1M]  [id number][MG1.1C].      Patient has used 6 of 12 appointments as of 1/27/17 and has 1 pending appointment on 1/30/17.[MG1.1M]       Revision History        User Key Date/Time User Provider Type Action    > MG1.1 01/27/17 04:13 PM Gowers, Molly Patient  Sign    C - Copied, M - Manual             Pt with 4kg (5%) wt loss in 2 weeks, and mild muscle and fat loss

## 2018-12-10 ENCOUNTER — APPOINTMENT (OUTPATIENT)
Dept: CARDIOLOGY | Facility: CLINIC | Age: 68
End: 2018-12-10
Payer: MEDICARE

## 2018-12-10 VITALS
HEIGHT: 68 IN | SYSTOLIC BLOOD PRESSURE: 137 MMHG | BODY MASS INDEX: 23.64 KG/M2 | OXYGEN SATURATION: 99 % | HEART RATE: 119 BPM | WEIGHT: 156 LBS | DIASTOLIC BLOOD PRESSURE: 85 MMHG

## 2018-12-10 LAB
ALBUMIN SERPL ELPH-MCNC: 4.2 G/DL
ALP BLD-CCNC: 90 U/L
ALT SERPL-CCNC: 22 U/L
ANION GAP SERPL CALC-SCNC: 11 MMOL/L
AST SERPL-CCNC: 27 U/L
BASOPHILS # BLD AUTO: 0.01 K/UL
BASOPHILS NFR BLD AUTO: 0.3 %
BILIRUB SERPL-MCNC: 0.6 MG/DL
BUN SERPL-MCNC: 34 MG/DL
CALCIUM SERPL-MCNC: 9.8 MG/DL
CHLORIDE SERPL-SCNC: 106 MMOL/L
CO2 SERPL-SCNC: 20 MMOL/L
CREAT SERPL-MCNC: 1.74 MG/DL
EOSINOPHIL # BLD AUTO: 0.05 K/UL
EOSINOPHIL NFR BLD AUTO: 1.4 %
GLUCOSE SERPL-MCNC: 100 MG/DL
HCT VFR BLD CALC: 34.2 %
HGB BLD-MCNC: 10.7 G/DL
IMM GRANULOCYTES NFR BLD AUTO: 0.5 %
LYMPHOCYTES # BLD AUTO: 1.57 K/UL
LYMPHOCYTES NFR BLD AUTO: 42.9 %
MAN DIFF?: NORMAL
MCHC RBC-ENTMCNC: 27.3 PG
MCHC RBC-ENTMCNC: 31.3 GM/DL
MCV RBC AUTO: 87.2 FL
MONOCYTES # BLD AUTO: 0.41 K/UL
MONOCYTES NFR BLD AUTO: 11.2 %
NEUTROPHILS # BLD AUTO: 1.6 K/UL
NEUTROPHILS NFR BLD AUTO: 43.7 %
PLATELET # BLD AUTO: 308 K/UL
POTASSIUM SERPL-SCNC: 6.1 MMOL/L
PROT SERPL-MCNC: 7.3 G/DL
RBC # BLD: 3.92 M/UL
RBC # FLD: 19.3 %
SODIUM SERPL-SCNC: 137 MMOL/L
TACROLIMUS SERPL-MCNC: 8.7 NG/ML
WBC # FLD AUTO: 3.66 K/UL

## 2018-12-10 PROCEDURE — 99214 OFFICE O/P EST MOD 30 MIN: CPT

## 2018-12-11 ENCOUNTER — OTHER (OUTPATIENT)
Age: 68
End: 2018-12-11

## 2018-12-11 LAB
CMV DNA SPEC QL NAA+PROBE: NOT DETECTED IU/ML
MAGNESIUM SERPL-MCNC: 2.1 MG/DL

## 2018-12-12 RX ORDER — MULTIVITAMIN
TABLET ORAL DAILY
Refills: 0 | Status: COMPLETED | COMMUNITY
Start: 2018-04-12 | End: 2018-12-12

## 2018-12-12 NOTE — HISTORY OF PRESENT ILLNESS
[FreeTextEntry1] : Mr. Baez is a 68 year old man with past medical history of a nonischemic cardiomyopathy and chronic systolic heart failure s/p HM2 LVAD in June 2017 complicated by recurrent GI hemorrhage and possible pump thrombosis who underwent heart transplant on 2/23/18 with a hepatitis C positive donor. His course was complicated by bilateral IJ/subclavian thrombi, a persistent small right sided pleural effusion, as well as acute on chronic renal failure of unclear etiology. In addition, his post-operative course was complicated by graft dysfunction of unclear etiology and persistent C4d positive staining on endomyocardial biopsy with negative DSAs. He developed joshua evidence of graft dysfunction leading to treatment with plasmapheresis, IVIG, and rituximab. \par \par In June, he was admitted with a left sided infiltrate, fevers, and chills. CT guided biopsy was unremarkable. He was treated empirically with antibiotics and discharged on voriconazole for presumed fungal pneumonia. He was readmitted on 8/14 through 8/29 and again in September with recurrent signs and symptoms of infection. CT scan showed a new right upper lobe infiltrate and he was ultimately diagnosed with Nocardia pneumonia and has been on treatment with Imipenem.  He was also found to have evidence of C. diff and was treated with oral vancomycin. He has completed the course of oral vanco.  Follow up chest CT scans notes decreasing size of lung nodule. IV antibiotics have been changed to oral bactrim with addition of Veltassa to control the hyperkalemia.\par \par He presents today for a routine follow up looking feeling well. He denies any fever, chills, nausea or vomiting. He has not sob, olivera, PND or orthopnea, activity is limited by chronic right jesus pain He is eating and sleeping well.  Per patient he "feels great". He continues on oral Bactrim for ongoing treatment of Nocardia, with addition of Veltassa for prevention of hyperkalemia, which has now been increased to the highest dose of 25mg.  Persistent elevated K, he was seen by Dr. Hutchins, who has recommended florinef.\par EKG reviewed, sinus tach otherwise normal\par labs reviewed

## 2018-12-12 NOTE — DISCUSSION/SUMMARY
[FreeTextEntry1] : - Immunosuppression:\par  His Prograf level this week is 8.7 will continue 5mg bid. Goal dose Prograf  level 8-10. He remains off of prednisone and Cellcept. \par \par - S/P Heart transplantation: His next RHC/BX is in January. \par \par - Hyperkalemia with CKD: He continues on dose of Veltessa 25mg, sodium bicarb 650mg bid.  He was started on Florinef 0.1mg daily by Dr Hutchins. DC multivitamin.  We may need to tolerate mildly elevated K while on nocardia treatment.\par \par - Antibiotic prophylaxis:CMV (intermediate risk):  valgancyclovir 450 daily, recent CMV undetecable. We will discuss Valcyte dosing with ID. \par PCP/Toxo (intermediate risk): He is currently on Bactrim. \par \par - Nocardia pneumonia: Continue current oral Bactrim per ID. \par \par - C. diff colitis: He has completed course of oral vancomycin.\par \par - CAV prophylaxis/hyperlipidemia: \par Continue ASA 81mg daily and pravachol 20 mg daily\par \par - Chronic hepatitis C infection: \par Completed course of mvyret with undetectable viral load. He will need routine monitoring per protocol. \par \par - Stress ulcer prophylaxis: Continue famotidine 20mg daily.\par \par - Osteoporosis prophylaxis: Continue calcium + vit D 2 tabs BID.\par \par - Hypomagnesemia: Levels have been stable, will decrease to 400mg daily per renal recemmendation\par - Weekly labs to monitor K and drug levels, more frequent as needed\par \par refer to cardiac rehab\par RHC/EMB Clinic visit early January\par

## 2018-12-18 LAB
ALBUMIN SERPL ELPH-MCNC: 4.3 G/DL
ALP BLD-CCNC: 98 U/L
ALT SERPL-CCNC: 34 U/L
ANION GAP SERPL CALC-SCNC: 10 MMOL/L
AST SERPL-CCNC: 34 U/L
BASOPHILS # BLD AUTO: 0.01 K/UL
BASOPHILS NFR BLD AUTO: 0.2 %
BILIRUB SERPL-MCNC: 0.6 MG/DL
BUN SERPL-MCNC: 38 MG/DL
CALCIUM SERPL-MCNC: 9.9 MG/DL
CHLORIDE SERPL-SCNC: 105 MMOL/L
CO2 SERPL-SCNC: 20 MMOL/L
CREAT SERPL-MCNC: 1.74 MG/DL
EOSINOPHIL # BLD AUTO: 0.04 K/UL
EOSINOPHIL NFR BLD AUTO: 1 %
GLUCOSE SERPL-MCNC: 78 MG/DL
HCT VFR BLD CALC: 32.4 %
HGB BLD-MCNC: 10.3 G/DL
IMM GRANULOCYTES NFR BLD AUTO: 1.5 %
LYMPHOCYTES # BLD AUTO: 1.7 K/UL
LYMPHOCYTES NFR BLD AUTO: 41.3 %
MAGNESIUM SERPL-MCNC: 1.7 MG/DL
MAN DIFF?: NORMAL
MCHC RBC-ENTMCNC: 26.8 PG
MCHC RBC-ENTMCNC: 31.8 GM/DL
MCV RBC AUTO: 84.2 FL
MONOCYTES # BLD AUTO: 0.63 K/UL
MONOCYTES NFR BLD AUTO: 15.3 %
NEUTROPHILS # BLD AUTO: 1.68 K/UL
NEUTROPHILS NFR BLD AUTO: 40.7 %
PLATELET # BLD AUTO: 277 K/UL
POTASSIUM SERPL-SCNC: 5.1 MMOL/L
PROT SERPL-MCNC: 7.3 G/DL
RBC # BLD: 3.85 M/UL
RBC # FLD: 18.9 %
SODIUM SERPL-SCNC: 134 MMOL/L
TACROLIMUS SERPL-MCNC: 8.2 NG/ML
WBC # FLD AUTO: 4.12 K/UL

## 2018-12-19 ENCOUNTER — OTHER (OUTPATIENT)
Age: 68
End: 2018-12-19

## 2018-12-21 NOTE — ED ADULT NURSE NOTE - NS ED NURSE LEVEL OF CONSCIOUSNESS AFFECT
Tripped over cat 4 days PTA, struck side on couch   Trauma Yellow Transfer Activation.  Shawn Garcia MD. Trauma Surgery.   Calm

## 2018-12-28 LAB
ALBUMIN SERPL ELPH-MCNC: 4.3 G/DL
ALP BLD-CCNC: 103 U/L
ALT SERPL-CCNC: 30 U/L
ANION GAP SERPL CALC-SCNC: 10 MMOL/L
AST SERPL-CCNC: 32 U/L
BASOPHILS # BLD AUTO: 0.01 K/UL
BASOPHILS NFR BLD AUTO: 0.2 %
BILIRUB SERPL-MCNC: 0.6 MG/DL
BUN SERPL-MCNC: 37 MG/DL
CALCIUM SERPL-MCNC: 9.4 MG/DL
CHLORIDE SERPL-SCNC: 107 MMOL/L
CO2 SERPL-SCNC: 21 MMOL/L
CREAT SERPL-MCNC: 1.72 MG/DL
EOSINOPHIL # BLD AUTO: 0.05 K/UL
EOSINOPHIL NFR BLD AUTO: 1.2 %
GLUCOSE SERPL-MCNC: 87 MG/DL
HCT VFR BLD CALC: 33.9 %
HGB BLD-MCNC: 10.8 G/DL
IMM GRANULOCYTES NFR BLD AUTO: 1 %
LYMPHOCYTES # BLD AUTO: 1.48 K/UL
LYMPHOCYTES NFR BLD AUTO: 35.2 %
MAGNESIUM SERPL-MCNC: 1.8 MG/DL
MAN DIFF?: NORMAL
MCHC RBC-ENTMCNC: 27.6 PG
MCHC RBC-ENTMCNC: 31.9 GM/DL
MCV RBC AUTO: 86.5 FL
MONOCYTES # BLD AUTO: 0.67 K/UL
MONOCYTES NFR BLD AUTO: 15.9 %
NEUTROPHILS # BLD AUTO: 1.96 K/UL
NEUTROPHILS NFR BLD AUTO: 46.5 %
PLATELET # BLD AUTO: 243 K/UL
POTASSIUM SERPL-SCNC: 5.2 MMOL/L
PROT SERPL-MCNC: 7.1 G/DL
RBC # BLD: 3.92 M/UL
RBC # FLD: 19.2 %
SODIUM SERPL-SCNC: 138 MMOL/L
TACROLIMUS SERPL-MCNC: 9 NG/ML
WBC # FLD AUTO: 4.21 K/UL

## 2018-12-31 LAB
CMV DNA SPEC QL NAA+PROBE: NOT DETECTED IU/ML
CMVPCR LOG: NOT DETECTED LOGIU/ML

## 2019-01-01 NOTE — PATIENT PROFILE ADULT. - BRADEN SCORE (IF 18 OR LESS ACTIVATE SKIN INJURY RISK INCREASED GUIDELINE), MLM
S: [place] Delivery  B: Mother history: GBS  Hepatitis B Negative  A: Baby girl delivered vaginally @ 0136, delayed cord clamping for 1-2 minutes. After cord was clamped and cut, baby was placed skin to skin on mother's chest for bonding within 5 minutes following birth. Apgars 9 & 9. Prior discussion with mother indicates feeding plan is breast:  . Mother educated in breastfeeding cues. Feeding cues were observed and recognized by mother. Breastfeeding initiated at 0200. Breastfeeding assistance was provided.   R: Bonding well with mother and father. Anticipate routine  care.       Umbilical Cord Section sent to Lab: Yes  Toxicology Order Released X2: No  Umbilical Cord Collected in Epic: No  Lab Notified Of Released Order: No   Notified: No                            20

## 2019-01-11 ENCOUNTER — OUTPATIENT (OUTPATIENT)
Dept: OUTPATIENT SERVICES | Facility: HOSPITAL | Age: 69
LOS: 1 days | End: 2019-01-11
Payer: MEDICARE

## 2019-01-11 VITALS
OXYGEN SATURATION: 97 % | WEIGHT: 153 LBS | HEIGHT: 68 IN | HEART RATE: 118 BPM | BODY MASS INDEX: 23.19 KG/M2 | SYSTOLIC BLOOD PRESSURE: 148 MMHG | DIASTOLIC BLOOD PRESSURE: 97 MMHG

## 2019-01-11 DIAGNOSIS — Z98.890 OTHER SPECIFIED POSTPROCEDURAL STATES: Chronic | ICD-10-CM

## 2019-01-11 DIAGNOSIS — A43.9 NOCARDIOSIS, UNSPECIFIED: ICD-10-CM

## 2019-01-11 DIAGNOSIS — Z94.1 HEART TRANSPLANT STATUS: Chronic | ICD-10-CM

## 2019-01-11 LAB
ALBUMIN SERPL ELPH-MCNC: 4.4 G/DL
ALP BLD-CCNC: 100 U/L
ALT SERPL-CCNC: 22 U/L
ANION GAP SERPL CALC-SCNC: 12 MMOL/L
AST SERPL-CCNC: 28 U/L
BASOPHILS # BLD AUTO: 0.01 K/UL
BASOPHILS NFR BLD AUTO: 0.2 %
BILIRUB SERPL-MCNC: 0.6 MG/DL
BUN SERPL-MCNC: 32 MG/DL
CALCIUM SERPL-MCNC: 9.7 MG/DL
CHLORIDE SERPL-SCNC: 106 MMOL/L
CO2 SERPL-SCNC: 20 MMOL/L
CREAT SERPL-MCNC: 1.45 MG/DL
EOSINOPHIL # BLD AUTO: 0.09 K/UL
EOSINOPHIL NFR BLD AUTO: 1.7 %
GLUCOSE SERPL-MCNC: 86 MG/DL
HCT VFR BLD CALC: 35.2 %
HGB BLD-MCNC: 11.2 G/DL
IMM GRANULOCYTES NFR BLD AUTO: 0.7 %
LYMPHOCYTES # BLD AUTO: 1.62 K/UL
LYMPHOCYTES NFR BLD AUTO: 29.9 %
MAGNESIUM SERPL-MCNC: 1.9 MG/DL
MAN DIFF?: NORMAL
MCHC RBC-ENTMCNC: 27.9 PG
MCHC RBC-ENTMCNC: 31.8 GM/DL
MCV RBC AUTO: 87.6 FL
MONOCYTES # BLD AUTO: 0.61 K/UL
MONOCYTES NFR BLD AUTO: 11.3 %
NEUTROPHILS # BLD AUTO: 3.04 K/UL
NEUTROPHILS NFR BLD AUTO: 56.2 %
PLATELET # BLD AUTO: 209 K/UL
POTASSIUM SERPL-SCNC: 5.4 MMOL/L
PROT SERPL-MCNC: 7.8 G/DL
RBC # BLD: 4.02 M/UL
RBC # FLD: 18.9 %
SODIUM SERPL-SCNC: 138 MMOL/L
TACROLIMUS SERPL-MCNC: 8.2 NG/ML
WBC # FLD AUTO: 5.41 K/UL

## 2019-01-11 PROCEDURE — 71250 CT THORAX DX C-: CPT

## 2019-01-11 PROCEDURE — 71250 CT THORAX DX C-: CPT | Mod: 26

## 2019-01-14 LAB — CMV DNA SPEC QL NAA+PROBE: NOT DETECTED IU/ML

## 2019-01-19 NOTE — PROGRESS NOTE ADULT - PROBLEM SELECTOR PLAN 2
Problem: Pain  Goal: Alleviation of Pain or a reduction in pain to the patient's comfort goal    Intervention: Pain Management--Medications  Patient has been medicated for pain with PRN IV morphine, non verbal pain assessment scale in use and 2 hour reassessments have been implemented.       Problem: Elimination  Goal: Urinary elimination support    Intervention: Monitor ability to void  RN has monitored the patient's ability to void. Patient is incontinent of urine at this time and is refusing a mcghee catheter.         Coumadin resumed/ aspirin on hold  Continue to monitor INR 2.5 mg tonight   H/H stable

## 2019-01-25 NOTE — H&P ADULT - NSHPRISKHEPCSCREEN_GEN_A_CORE
Unable to offer due to clinical condition X Size Of Lesion In Cm (Optional): 0 Detail Level: Detailed Size Of Lesion In Cm (Optional): 0.5

## 2019-01-31 ENCOUNTER — OTHER (OUTPATIENT)
Age: 69
End: 2019-01-31

## 2019-01-31 ENCOUNTER — LABORATORY RESULT (OUTPATIENT)
Age: 69
End: 2019-01-31

## 2019-01-31 LAB
ALBUMIN SERPL ELPH-MCNC: 4.5 G/DL
ALP BLD-CCNC: 93 U/L
ALT SERPL-CCNC: 19 U/L
ANION GAP SERPL CALC-SCNC: 10 MMOL/L
AST SERPL-CCNC: 27 U/L
BASOPHILS # BLD AUTO: 0.01 K/UL
BASOPHILS NFR BLD AUTO: 0.2 %
BILIRUB SERPL-MCNC: 0.6 MG/DL
BUN SERPL-MCNC: 31 MG/DL
CALCIUM SERPL-MCNC: 9.8 MG/DL
CHLORIDE SERPL-SCNC: 104 MMOL/L
CO2 SERPL-SCNC: 22 MMOL/L
CREAT SERPL-MCNC: 1.62 MG/DL
EOSINOPHIL # BLD AUTO: 0.07 K/UL
EOSINOPHIL NFR BLD AUTO: 1.3 %
GLUCOSE SERPL-MCNC: 88 MG/DL
HCT VFR BLD CALC: 35.6 %
HGB BLD-MCNC: 11.3 G/DL
IMM GRANULOCYTES NFR BLD AUTO: 0.4 %
LYMPHOCYTES # BLD AUTO: 1.78 K/UL
LYMPHOCYTES NFR BLD AUTO: 33.2 %
MAGNESIUM SERPL-MCNC: 1.8 MG/DL
MAN DIFF?: NORMAL
MCHC RBC-ENTMCNC: 29.3 PG
MCHC RBC-ENTMCNC: 31.7 GM/DL
MCV RBC AUTO: 92.2 FL
MONOCYTES # BLD AUTO: 1.05 K/UL
MONOCYTES NFR BLD AUTO: 19.6 %
NEUTROPHILS # BLD AUTO: 2.43 K/UL
NEUTROPHILS NFR BLD AUTO: 45.3 %
PLATELET # BLD AUTO: 237 K/UL
POTASSIUM SERPL-SCNC: 4.6 MMOL/L
PROT SERPL-MCNC: 7.6 G/DL
RBC # BLD: 3.86 M/UL
RBC # FLD: 17.9 %
SODIUM SERPL-SCNC: 135 MMOL/L
TACROLIMUS SERPL-MCNC: 8.6 NG/ML
WBC # FLD AUTO: 5.36 K/UL

## 2019-01-31 NOTE — PATIENT PROFILE ADULT. - MEDICATION PUMPS, PROFILE
[facility]    1/31/2019 9:22 AM    Max heart rate: 85%    77 year old    Wt Readings from Last 1 Encounters:   01/29/19 92.4 kg      Ht Readings from Last 1 Encounters:   01/29/19 6' (1.829 m)       Patient Type: Outpatient    Cardiac Markers: No    Reason for test: Abn EKG    Primary MD: ELPIDIO Caba       Ordering MD: DARLENE Walter       Cardiologist Performing Test: ALEXIS Thompson    --------------------------------------------------------------------------------------------------------------------  HISTORY OF: High Cholesterol       PATIENT HAS HAD THE FOLLOWING IN THE LAST 24 HOURS:  None.    Current Outpatient Medications   Medication Sig Dispense Refill   • naproxen sodium (ALEVE) 220 MG tablet Take 220 mg by mouth daily as needed.       No current facility-administered medications for this encounter.        ALLERGIES:  No Known Allergies    MEETS CRITERIA FOR STRESS TEST: Yes           TYPE OF TEST: Lexiscan    ABLE TO WALK: Safely and Yes     NEEDED: No   medication pump(s) used

## 2019-02-01 ENCOUNTER — APPOINTMENT (OUTPATIENT)
Dept: CARDIOLOGY | Facility: CLINIC | Age: 69
End: 2019-02-01

## 2019-02-01 ENCOUNTER — APPOINTMENT (OUTPATIENT)
Dept: CV DIAGNOSITCS | Facility: HOSPITAL | Age: 69
End: 2019-02-01

## 2019-02-02 LAB
CMV DNA SPEC QL NAA+PROBE: NOT DETECTED IU/ML
CMVPCR LOG: NOT DETECTED LOGIU/ML
HCV RNA SERPL NAA DL=5-ACNC: NOT DETECTED IU/ML
HCV RNA SERPL NAA+PROBE-LOG IU: NOT DETECTED LOGIU/ML

## 2019-02-05 ENCOUNTER — APPOINTMENT (OUTPATIENT)
Dept: CARDIOLOGY | Facility: CLINIC | Age: 69
End: 2019-02-05
Payer: MEDICARE

## 2019-02-05 PROCEDURE — 93798 PHYS/QHP OP CAR RHAB W/ECG: CPT

## 2019-02-08 ENCOUNTER — RESULT REVIEW (OUTPATIENT)
Age: 69
End: 2019-02-08

## 2019-02-08 ENCOUNTER — OUTPATIENT (OUTPATIENT)
Dept: OUTPATIENT SERVICES | Facility: HOSPITAL | Age: 69
LOS: 1 days | End: 2019-02-08
Payer: MEDICARE

## 2019-02-08 ENCOUNTER — APPOINTMENT (OUTPATIENT)
Dept: CV DIAGNOSITCS | Facility: HOSPITAL | Age: 69
End: 2019-02-08

## 2019-02-08 ENCOUNTER — APPOINTMENT (OUTPATIENT)
Dept: CARDIOLOGY | Facility: CLINIC | Age: 69
End: 2019-02-08
Payer: MEDICARE

## 2019-02-08 VITALS
HEART RATE: 106 BPM | TEMPERATURE: 98.2 F | OXYGEN SATURATION: 96 % | SYSTOLIC BLOOD PRESSURE: 132 MMHG | RESPIRATION RATE: 18 BRPM | WEIGHT: 155 LBS | HEIGHT: 68 IN | BODY MASS INDEX: 23.49 KG/M2 | DIASTOLIC BLOOD PRESSURE: 88 MMHG

## 2019-02-08 VITALS
OXYGEN SATURATION: 98 % | WEIGHT: 154.98 LBS | DIASTOLIC BLOOD PRESSURE: 88 MMHG | TEMPERATURE: 98 F | HEART RATE: 109 BPM | SYSTOLIC BLOOD PRESSURE: 132 MMHG | HEIGHT: 68 IN | RESPIRATION RATE: 18 BRPM

## 2019-02-08 DIAGNOSIS — Z94.1 HEART TRANSPLANT STATUS: ICD-10-CM

## 2019-02-08 DIAGNOSIS — Z98.890 OTHER SPECIFIED POSTPROCEDURAL STATES: Chronic | ICD-10-CM

## 2019-02-08 DIAGNOSIS — E11.65 TYPE 2 DIABETES MELLITUS WITH HYPERGLYCEMIA: ICD-10-CM

## 2019-02-08 DIAGNOSIS — Z94.1 HEART TRANSPLANT STATUS: Chronic | ICD-10-CM

## 2019-02-08 DIAGNOSIS — S61.209A UNSPECIFIED OPEN WOUND OF UNSPECIFIED FINGER W/OUT DAMAGE TO NAIL, INITIAL ENCOUNTER: ICD-10-CM

## 2019-02-08 LAB
ALBUMIN SERPL ELPH-MCNC: 4.4 G/DL — SIGNIFICANT CHANGE UP (ref 3.3–5)
ALP SERPL-CCNC: 93 U/L — SIGNIFICANT CHANGE UP (ref 40–120)
ALT FLD-CCNC: 21 U/L — SIGNIFICANT CHANGE UP (ref 10–45)
ANION GAP SERPL CALC-SCNC: 12 MMOL/L — SIGNIFICANT CHANGE UP (ref 5–17)
AST SERPL-CCNC: 22 U/L — SIGNIFICANT CHANGE UP (ref 10–40)
BILIRUB SERPL-MCNC: 0.6 MG/DL — SIGNIFICANT CHANGE UP (ref 0.2–1.2)
BUN SERPL-MCNC: 32 MG/DL — HIGH (ref 7–23)
CALCIUM SERPL-MCNC: 9.4 MG/DL — SIGNIFICANT CHANGE UP (ref 8.4–10.5)
CHLORIDE SERPL-SCNC: 105 MMOL/L — SIGNIFICANT CHANGE UP (ref 96–108)
CO2 SERPL-SCNC: 20 MMOL/L — LOW (ref 22–31)
CREAT SERPL-MCNC: 1.6 MG/DL — HIGH (ref 0.5–1.3)
GLUCOSE SERPL-MCNC: 92 MG/DL — SIGNIFICANT CHANGE UP (ref 70–99)
HCT VFR BLD CALC: 35.2 % — LOW (ref 39–50)
HGB BLD-MCNC: 11.8 G/DL — LOW (ref 13–17)
MCHC RBC-ENTMCNC: 30.8 PG — SIGNIFICANT CHANGE UP (ref 27–34)
MCHC RBC-ENTMCNC: 33.6 GM/DL — SIGNIFICANT CHANGE UP (ref 32–36)
MCV RBC AUTO: 91.7 FL — SIGNIFICANT CHANGE UP (ref 80–100)
PLATELET # BLD AUTO: 226 K/UL — SIGNIFICANT CHANGE UP (ref 150–400)
POTASSIUM SERPL-MCNC: 5.2 MMOL/L — SIGNIFICANT CHANGE UP (ref 3.5–5.3)
POTASSIUM SERPL-SCNC: 5.2 MMOL/L — SIGNIFICANT CHANGE UP (ref 3.5–5.3)
PROT SERPL-MCNC: 7.8 G/DL — SIGNIFICANT CHANGE UP (ref 6–8.3)
RBC # BLD: 3.84 M/UL — LOW (ref 4.2–5.8)
RBC # FLD: 16.6 % — HIGH (ref 10.3–14.5)
SODIUM SERPL-SCNC: 137 MMOL/L — SIGNIFICANT CHANGE UP (ref 135–145)
TACROLIMUS SERPL-MCNC: 7.2 NG/ML — SIGNIFICANT CHANGE UP
WBC # BLD: 7 K/UL — SIGNIFICANT CHANGE UP (ref 3.8–10.5)
WBC # FLD AUTO: 7 K/UL — SIGNIFICANT CHANGE UP (ref 3.8–10.5)

## 2019-02-08 PROCEDURE — 93321 DOPPLER ECHO F-UP/LMTD STD: CPT

## 2019-02-08 PROCEDURE — 88346 IMFLUOR 1ST 1ANTB STAIN PX: CPT

## 2019-02-08 PROCEDURE — 80197 ASSAY OF TACROLIMUS: CPT

## 2019-02-08 PROCEDURE — 99152 MOD SED SAME PHYS/QHP 5/>YRS: CPT

## 2019-02-08 PROCEDURE — 80053 COMPREHEN METABOLIC PANEL: CPT

## 2019-02-08 PROCEDURE — 93308 TTE F-UP OR LMTD: CPT | Mod: 26,59

## 2019-02-08 PROCEDURE — C1769: CPT

## 2019-02-08 PROCEDURE — 93306 TTE W/DOPPLER COMPLETE: CPT

## 2019-02-08 PROCEDURE — 93321 DOPPLER ECHO F-UP/LMTD STD: CPT | Mod: 26,59

## 2019-02-08 PROCEDURE — 76937 US GUIDE VASCULAR ACCESS: CPT

## 2019-02-08 PROCEDURE — 76937 US GUIDE VASCULAR ACCESS: CPT | Mod: 26,GC

## 2019-02-08 PROCEDURE — 99215 OFFICE O/P EST HI 40 MIN: CPT

## 2019-02-08 PROCEDURE — 93505 ENDOMYOCARDIAL BIOPSY: CPT | Mod: 26,GC

## 2019-02-08 PROCEDURE — C1894: CPT

## 2019-02-08 PROCEDURE — 88307 TISSUE EXAM BY PATHOLOGIST: CPT

## 2019-02-08 PROCEDURE — 93460 R&L HRT ART/VENTRICLE ANGIO: CPT | Mod: 26,GC

## 2019-02-08 PROCEDURE — 88342 IMHCHEM/IMCYTCHM 1ST ANTB: CPT | Mod: 26

## 2019-02-08 PROCEDURE — 99153 MOD SED SAME PHYS/QHP EA: CPT

## 2019-02-08 PROCEDURE — C1887: CPT

## 2019-02-08 PROCEDURE — 93000 ELECTROCARDIOGRAM COMPLETE: CPT

## 2019-02-08 PROCEDURE — 92979 ENDOLUMINL IVUS OCT C EA: CPT | Mod: LD

## 2019-02-08 PROCEDURE — C1884: CPT

## 2019-02-08 PROCEDURE — 93505 ENDOMYOCARDIAL BIOPSY: CPT

## 2019-02-08 PROCEDURE — 92978 ENDOLUMINL IVUS OCT C 1ST: CPT | Mod: 26,LM,GC

## 2019-02-08 PROCEDURE — C1817: CPT

## 2019-02-08 PROCEDURE — 93010 ELECTROCARDIOGRAM REPORT: CPT

## 2019-02-08 PROCEDURE — 93005 ELECTROCARDIOGRAM TRACING: CPT

## 2019-02-08 PROCEDURE — 93306 TTE W/DOPPLER COMPLETE: CPT | Mod: 26

## 2019-02-08 PROCEDURE — C1753: CPT

## 2019-02-08 PROCEDURE — 88307 TISSUE EXAM BY PATHOLOGIST: CPT | Mod: 26

## 2019-02-08 PROCEDURE — 85027 COMPLETE CBC AUTOMATED: CPT

## 2019-02-08 PROCEDURE — 88346 IMFLUOR 1ST 1ANTB STAIN PX: CPT | Mod: 26

## 2019-02-08 PROCEDURE — 88342 IMHCHEM/IMCYTCHM 1ST ANTB: CPT

## 2019-02-08 PROCEDURE — 93460 R&L HRT ART/VENTRICLE ANGIO: CPT

## 2019-02-08 PROCEDURE — 92978 ENDOLUMINL IVUS OCT C 1ST: CPT | Mod: LM

## 2019-02-08 PROCEDURE — 93308 TTE F-UP OR LMTD: CPT

## 2019-02-08 PROCEDURE — 0399T: CPT

## 2019-02-08 PROCEDURE — 92979 ENDOLUMINL IVUS OCT C EA: CPT | Mod: 26,LD,GC

## 2019-02-08 RX ORDER — VALGANCICLOVIR HYDROCHLORIDE 450 MG/1
450 TABLET ORAL
Qty: 30 | Refills: 5 | Status: DISCONTINUED | COMMUNITY
Start: 2018-11-09 | End: 2019-02-08

## 2019-02-08 RX ORDER — PATIROMER 8.4 G/1
0 POWDER, FOR SUSPENSION ORAL
Qty: 0 | Refills: 0 | COMMUNITY

## 2019-02-08 RX ORDER — SODIUM CHLORIDE 9 MG/ML
3 INJECTION INTRAMUSCULAR; INTRAVENOUS; SUBCUTANEOUS EVERY 8 HOURS
Qty: 0 | Refills: 0 | Status: DISCONTINUED | OUTPATIENT
Start: 2019-02-08 | End: 2019-02-23

## 2019-02-08 RX ORDER — AZTREONAM 2 G
1 VIAL (EA) INJECTION
Qty: 0 | Refills: 0 | COMMUNITY

## 2019-02-08 NOTE — HISTORY OF PRESENT ILLNESS
[FreeTextEntry1] : Mr. Baez is a 68 year old man with past medical history of a nonischemic cardiomyopathy and chronic systolic heart failure s/p HM2 LVAD in June 2017 complicated by recurrent GI hemorrhage and possible pump thrombosis who underwent heart transplant on 2/23/18 with a hepatitis C positive donor. His course was complicated by bilateral IJ/subclavian thrombi, a persistent small right sided pleural effusion, as well as acute on chronic renal failure of unclear etiology. In addition, his post-operative course was complicated by graft dysfunction of unclear etiology and persistent C4d positive staining on endomyocardial biopsy with negative DSAs. He developed joshua evidence of graft dysfunction leading to treatment with plasmapheresis, IVIG, and rituximab. Subsequently in June of 2018 he was found to have an pneumonia, that was ultimately diagnosed as Nocardia\par \par He presents today for RHC with biopsy with angiogram as part of his annual evaluation. He currently feels well. He continues on oral Bactrim for ongoing treatment of Nocardia. He notes that he can walk without shortness of breath, but has ongoing limitations due to chronic right knee pain. He has no PND or orthopnea and no cough. He denies fevers or chills. He has no abdominal pain, increase in abdominal girth or swelling in the legs. He has no diarrhea or rashes. He notes extrusion of an old pacing wire under his right lower ribs. \par \par He had an EKG today, which I read, which shows sinus tachycardia at 106 bpm. He has a right bundle branch block, which is stable.

## 2019-02-08 NOTE — REASON FOR VISIT
[Follow-Up - Clinic] : a clinic follow-up of [Heart Transplant] : heart transplant [FreeTextEntry1] : immunosuppressed status, annual visit.

## 2019-02-08 NOTE — ASSESSMENT
[FreeTextEntry1] : Mr. Baez is a 68 year old man with history of NICM, s/p heart transplantation on 2/23/18. 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 Early post transplant he received therapy with IVIG, plasmapheresis, and rituximab with course complicated by development of Nocardia pulmonary infection, for which he is currently on therapy with Bactrim. His tacrolimus level is therapeutic at 8.6.

## 2019-02-08 NOTE — H&P CARDIOLOGY - PMH
CHF (Congestive Heart Failure)    DVT of upper extremity (deep vein thrombosis)    Former smoker    GIB (gastrointestinal bleeding)    Hepatitis C virus    HLD (hyperlipidemia)    HTN    Non-Ischemic Cardiomyopathy    PAF (paroxysmal atrial fibrillation)  on xarelto  SVT (Supraventricular Tachycardia)    Ventricular fibrillation  s/p AICD CHF (Congestive Heart Failure)    DVT of upper extremity (deep vein thrombosis)    Former smoker    GIB (gastrointestinal bleeding)    Hepatitis C virus    HLD (hyperlipidemia)    HTN    Knee pain, right    Non-Ischemic Cardiomyopathy    PAF (paroxysmal atrial fibrillation)  on xarelto  SVT (Supraventricular Tachycardia)    Ventricular fibrillation  s/p AICD

## 2019-02-08 NOTE — H&P CARDIOLOGY - HISTORY OF PRESENT ILLNESS
Mr. Baez is a 68 year old man with PMHx of a nonischemic cardiomyopathy and chronic systolic heart failure s/p HM2 LVAD in June 2017 complicated by recurrent GI hemorrhage and possible pump thrombosis who underwent heart transplant on 2/23/18 (CMV D-/R+ and Toxo D-/R+, Hep C+ heart). His course was complicated by bilateral IJ/subclavian thrombi, a persistent small right sided pleural effusion, as well as acute on chronic renal failure of unclear etiology. In addition, his post-operative course was complicated by graft dysfunction of unclear etiology and persistent C4d positive staining on endomyocardial biopsy with negative DSAs. He developed joshua evidence of graft dysfunction leading to treatment with plasmapheresis, IVIG, and rituximab.     In June 2018, he was admitted with a left sided infiltrate, fevers, and chills. CT guided biopsy was unremarkable. He was treated empirically with antibiotics and discharged on voriconazole for presumed fungal pneumonia. He was readmitted on 8/14 through 8/29 and again in September with recurrent signs and symptoms of infection. CT scan showed a new right upper lobe infiltrate and he was ultimately diagnosed with Nocardia pneumonia and has been on treatment with Imipenem. He was also found to have evidence of C. diff and was treated with oral vancomycin. C diff toxin positive c/b colitis on last RHC w/biopsy noted to be febrile and swelling s/p hospitalization for PNA and JP continues on oral ABX and Vanco taper with Rt arm PICC line removed 11/7/18    -Discharged from [facility] rehab and was transitioned from IV antibiotics to oral Bactrim for ongoing treatment of Nocardia. He was simultaneously started on Veltessa given prior episodes of hyperkalemia. He notes that he can walk without shortness of breath, but is primarily limited by ongoing chronic right knee pain. He was previously scheduled for RHC, however deferred due to hyperkalemia. He was admitted and treated- He received Kayexalate with an improvement in potassium level.   - Here today for RHC. He denies PND or orthopnea and no cough. He denies fevers or chills. He has no abdominal pain, increase in abdominal girth or swelling in the legs. He has no diarrhea or rashes.

## 2019-02-08 NOTE — H&P CARDIOLOGY - PSH
AICD (Automatic Cardioverter/Defibrillator) Present  [employer] with 1 [employer] lead4/1/09- explanted and replaced with [employer] 2 leads on 9/2/09  H/O heart transplant  2/2018  History of Prior Ablation Treatment  for afib  S/P right heart catheterization  biopsy multiple  Status post left hip replacement

## 2019-02-08 NOTE — DISCUSSION/SUMMARY
[FreeTextEntry1] : - Immunosuppression: We will continue him on current dose of Prograf. Goal dose Prograf  level 8-10. He remains off of prednisone and Cellcept. We will order Allomap to mimize future biopsies as he has not had cellular rejection. \par \par - S/P Heart transplantation: He will have RHC with biopsy today. No evidence of clnically significant graft dysfunction or congestive heart failure on exam. 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 He will have a coronary angiogram with IVUS as part of annual testing today to evaluate for the development of CAV. Additional annual testing, including DEXA and cancer screening will be orderd (dermatology). \par \par - Hyperkalemia with CKD, stage III, currently controlled: He continues on Veltessa, fludrocortisone, and sodium bicarbonate. When Bactrim is discontinued, we will reevaluate his need. \par \par - Antibiotic prophylaxis:CMV (intermediate risk): Valcyte is discontinued. He will have repeat CMV today and have his annual CDC high risk testing. \par \par -Nocardia pneumonia: Continue current oral Bactrim per ID. Will discuss with Dr. Lujan duration of therapy. \par \par - CAV prophylaxis/hyperlipidemia: Continue ASA 81mg daily and pravachol 20 mg daily. We will order a lipid profile. \par \par - Chronic hepatitis C infection: Currently treated with undetectable viral load. He will need routine monitoring per protocol. \par \par - Stress ulcer prophylaxis: Continue famotidine 20mg daily.\par \par - Osteoporosis prophylaxis: Continue calcium + vit D 2 tabs BID.\par \par - Hypomagnesemia: Continue magnesium oxide 1200 mg daily.'\par \par - Extruded pacing wire: This was clipped at the bedside under traction. No evidence of infection.

## 2019-02-11 ENCOUNTER — APPOINTMENT (OUTPATIENT)
Dept: CARDIOLOGY | Facility: CLINIC | Age: 69
End: 2019-02-11

## 2019-02-13 ENCOUNTER — APPOINTMENT (OUTPATIENT)
Dept: CARDIOLOGY | Facility: CLINIC | Age: 69
End: 2019-02-13
Payer: MEDICARE

## 2019-02-13 LAB — SURGICAL PATHOLOGY STUDY: SIGNIFICANT CHANGE UP

## 2019-02-13 PROCEDURE — 93798 PHYS/QHP OP CAR RHAB W/ECG: CPT

## 2019-02-14 ENCOUNTER — APPOINTMENT (OUTPATIENT)
Dept: CARDIOLOGY | Facility: CLINIC | Age: 69
End: 2019-02-14
Payer: MEDICARE

## 2019-02-14 PROCEDURE — 93798 PHYS/QHP OP CAR RHAB W/ECG: CPT

## 2019-02-20 ENCOUNTER — APPOINTMENT (OUTPATIENT)
Dept: CARDIOLOGY | Facility: CLINIC | Age: 69
End: 2019-02-20
Payer: MEDICARE

## 2019-02-20 PROCEDURE — 93798 PHYS/QHP OP CAR RHAB W/ECG: CPT

## 2019-02-21 ENCOUNTER — APPOINTMENT (OUTPATIENT)
Dept: CARDIOLOGY | Facility: CLINIC | Age: 69
End: 2019-02-21
Payer: MEDICARE

## 2019-02-21 PROCEDURE — 93798 PHYS/QHP OP CAR RHAB W/ECG: CPT

## 2019-02-25 ENCOUNTER — APPOINTMENT (OUTPATIENT)
Dept: CARDIOLOGY | Facility: CLINIC | Age: 69
End: 2019-02-25
Payer: MEDICARE

## 2019-02-25 PROCEDURE — 93798 PHYS/QHP OP CAR RHAB W/ECG: CPT

## 2019-02-27 ENCOUNTER — APPOINTMENT (OUTPATIENT)
Dept: CARDIOLOGY | Facility: CLINIC | Age: 69
End: 2019-02-27
Payer: MEDICARE

## 2019-02-27 ENCOUNTER — OTHER (OUTPATIENT)
Age: 69
End: 2019-02-27

## 2019-02-27 PROCEDURE — 93798 PHYS/QHP OP CAR RHAB W/ECG: CPT

## 2019-02-27 RX ORDER — FAMOTIDINE 20 MG/1
20 TABLET, FILM COATED ORAL AT BEDTIME
Qty: 30 | Refills: 5 | Status: COMPLETED | COMMUNITY
Start: 2018-04-12 | End: 2019-02-27

## 2019-02-28 ENCOUNTER — APPOINTMENT (OUTPATIENT)
Dept: CARDIOLOGY | Facility: CLINIC | Age: 69
End: 2019-02-28
Payer: MEDICARE

## 2019-02-28 PROCEDURE — 93798 PHYS/QHP OP CAR RHAB W/ECG: CPT

## 2019-03-01 ENCOUNTER — APPOINTMENT (OUTPATIENT)
Dept: CARDIOLOGY | Facility: CLINIC | Age: 69
End: 2019-03-01
Payer: MEDICARE

## 2019-03-01 VITALS
WEIGHT: 154 LBS | HEART RATE: 119 BPM | TEMPERATURE: 97.7 F | DIASTOLIC BLOOD PRESSURE: 90 MMHG | HEIGHT: 68 IN | SYSTOLIC BLOOD PRESSURE: 129 MMHG | BODY MASS INDEX: 23.34 KG/M2 | OXYGEN SATURATION: 98 %

## 2019-03-01 PROBLEM — M25.561 PAIN IN RIGHT KNEE: Chronic | Status: ACTIVE | Noted: 2019-02-08

## 2019-03-01 LAB
ALBUMIN SERPL ELPH-MCNC: 4.4 G/DL
ALP BLD-CCNC: 115 U/L
ALT SERPL-CCNC: 35 U/L
ANION GAP SERPL CALC-SCNC: 14 MMOL/L
AST SERPL-CCNC: 34 U/L
BASOPHILS # BLD AUTO: 0.01 K/UL
BASOPHILS NFR BLD AUTO: 0.2 %
BILIRUB SERPL-MCNC: 0.6 MG/DL
BUN SERPL-MCNC: 33 MG/DL
CALCIUM SERPL-MCNC: 9.6 MG/DL
CHLORIDE SERPL-SCNC: 104 MMOL/L
CO2 SERPL-SCNC: 20 MMOL/L
CREAT SERPL-MCNC: 1.61 MG/DL
EOSINOPHIL # BLD AUTO: 0.07 K/UL
EOSINOPHIL NFR BLD AUTO: 1.2 %
GLUCOSE SERPL-MCNC: 90 MG/DL
HBA1C MFR BLD HPLC: 5 %
HBV CORE IGG+IGM SER QL: NONREACTIVE
HBV SURFACE AB SER QL: NONREACTIVE
HBV SURFACE AG SER QL: NONREACTIVE
HCT VFR BLD CALC: 34.8 %
HEPATITIS A IGG ANTIBODY: REACTIVE
HGB BLD-MCNC: 10.6 G/DL
IMM GRANULOCYTES NFR BLD AUTO: 0.3 %
LYMPHOCYTES # BLD AUTO: 1.56 K/UL
LYMPHOCYTES NFR BLD AUTO: 27.2 %
MAGNESIUM SERPL-MCNC: 1.9 MG/DL
MAN DIFF?: NORMAL
MCHC RBC-ENTMCNC: 29.4 PG
MCHC RBC-ENTMCNC: 30.5 GM/DL
MCV RBC AUTO: 96.4 FL
MONOCYTES # BLD AUTO: 0.84 K/UL
MONOCYTES NFR BLD AUTO: 14.7 %
NEUTROPHILS # BLD AUTO: 3.23 K/UL
NEUTROPHILS NFR BLD AUTO: 56.4 %
PLATELET # BLD AUTO: 270 K/UL
POTASSIUM SERPL-SCNC: 5.1 MMOL/L
PROT SERPL-MCNC: 6.9 G/DL
RBC # BLD: 3.61 M/UL
RBC # FLD: 14.8 %
SODIUM SERPL-SCNC: 138 MMOL/L
TACROLIMUS SERPL-MCNC: 7.1 NG/ML
WBC # FLD AUTO: 5.73 K/UL

## 2019-03-01 PROCEDURE — 99214 OFFICE O/P EST MOD 30 MIN: CPT

## 2019-03-04 LAB
HBV DNA # SERPL NAA+PROBE: NOT DETECTED IU/ML
HCV RNA SERPL NAA DL=5-ACNC: NOT DETECTED IU/ML
HCV RNA SERPL NAA+PROBE-LOG IU: NOT DETECTED LOGIU/ML
HEPB DNA PCR LOG: NOT DETECTED LOGIU/ML
HIV1 RNA # SERPL NAA+PROBE: NORMAL
HIV1 RNA # SERPL NAA+PROBE: NORMAL COPIES/ML
VIRAL LOAD INTERP: NORMAL
VIRAL LOAD LOG: NORMAL LG COP/ML

## 2019-03-04 NOTE — HISTORY OF PRESENT ILLNESS
[FreeTextEntry1] : Mr. Baez is a 68 year old man with past medical history of a nonischemic cardiomyopathy and chronic systolic heart failure s/p HM2 LVAD in June 2017 complicated by recurrent GI hemorrhage and possible pump thrombosis who underwent heart transplant on 2/23/18 with a hepatitis C positive donor. His course was complicated by bilateral IJ/subclavian thrombi, a persistent small right sided pleural effusion, as well as acute on chronic renal failure of unclear etiology. In addition, his post-operative course was complicated by graft dysfunction of unclear etiology and persistent C4d positive staining on endomyocardial biopsy with negative DSAs. He developed joshua evidence of graft dysfunction leading to treatment with plasmapheresis, IVIG, and rituximab. Subsequently in June of 2018 he was found to have an pneumonia, that was ultimately diagnosed as Nocardia for which he has been treated with Bactrim\par \par He presents today for labs and routine visit.  . He currently looks and feels well. He continues on oral Bactrim for ongoing treatment of Nocardia. He notes that he can walk without shortness of breath, but has ongoing limitations due to chronic right knee pain. He has been participating in cardiac rehab without issue. He has no PND or orthopnea and no cough. He denies fevers or chills. He has no abdominal pain, increase in abdominal girth or swelling in the legs. He has no diarrhea or rashes. He remains on oral Bactrim for ongoing treatment of nocardia, a recent CT scan noted the RU lobe nodule nearly resolved. EMB 2/8 1R/1A with normal filling pressures.

## 2019-03-04 NOTE — ASSESSMENT
[FreeTextEntry1] : Mr. Baez is a 68 year old man with history of NICM, s/p heart transplantation on 2/23/18. 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 Early post transplant he received therapy with IVIG, plasmapheresis, and rituximab with course complicated by development of Nocardia pulmonary infection, for which he is currently on therapy with Bactrim. He is one year post transplant and doing remarkably well.

## 2019-03-04 NOTE — DISCUSSION/SUMMARY
[FreeTextEntry1] : - Immunosuppression: We will continue him on current dose of Prograf. Goal dose Prograf  level 8-10. He remains off of prednisone and Cellcept. 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 Annual labs wnl.  Additional annual testing, including DEXA and cancer screening will be orderd (dermatology). \par \par - Hyperkalemia with CKD, stage III, currently controlled: He continues on Veltessa, fludrocortisone, and sodium bicarbonate. When Bactrim is discontinued, we will reevaluate his need. \par \par - Antibiotic prophylaxis:CMV (intermediate risk): Valcyte is discontinued. He will have repeat CMV today and have his annual CDC high risk testing. \par \par -Nocardia pneumonia: Continue current oral Bactrim per ID. Duration of therapy per Dr Lujan\par \par - CAV prophylaxis/hyperlipidemia: Continue ASA 81mg daily and pravachol 20 mg daily. We will order a lipid profile. \par \par - Chronic hepatitis C infection: Completed treatment with undetectable viral load. He will need routine monitoring per protocol. \par \par - Osteoporosis prophylaxis: Continue calcium + vit D 2 tabs BID.\par \par - Hypomagnesemia: Continue magnesium oxide 400 mg daily.'\par \par Ongoing follow up with routine labs.  RHC/EMB Q 3months\par RTC 1 month

## 2019-03-05 LAB
CMV DNA SPEC QL NAA+PROBE: NOT DETECTED
CMVPCR LOG: NOT DETECTED LOGIU/ML
EBV DNA SERPL NAA+PROBE-ACNC: NOT DETECTED IU/ML
EBVPCR LOG: NOT DETECTED LOGIU/ML

## 2019-03-06 ENCOUNTER — APPOINTMENT (OUTPATIENT)
Dept: CARDIOLOGY | Facility: CLINIC | Age: 69
End: 2019-03-06
Payer: MEDICARE

## 2019-03-06 LAB — HIV 2 PROVIRAL DNA SERPL QL NAA+PROBE: NOT DETECTED

## 2019-03-06 PROCEDURE — 93798 PHYS/QHP OP CAR RHAB W/ECG: CPT

## 2019-03-07 ENCOUNTER — APPOINTMENT (OUTPATIENT)
Dept: CARDIOLOGY | Facility: CLINIC | Age: 69
End: 2019-03-07
Payer: MEDICARE

## 2019-03-07 PROCEDURE — 93798 PHYS/QHP OP CAR RHAB W/ECG: CPT

## 2019-03-11 ENCOUNTER — APPOINTMENT (OUTPATIENT)
Dept: CARDIOLOGY | Facility: CLINIC | Age: 69
End: 2019-03-11
Payer: MEDICARE

## 2019-03-11 PROCEDURE — 93798 PHYS/QHP OP CAR RHAB W/ECG: CPT

## 2019-03-13 ENCOUNTER — APPOINTMENT (OUTPATIENT)
Dept: CARDIOLOGY | Facility: CLINIC | Age: 69
End: 2019-03-13
Payer: MEDICARE

## 2019-03-13 PROCEDURE — 93798 PHYS/QHP OP CAR RHAB W/ECG: CPT

## 2019-03-14 ENCOUNTER — APPOINTMENT (OUTPATIENT)
Dept: CARDIOLOGY | Facility: CLINIC | Age: 69
End: 2019-03-14
Payer: MEDICARE

## 2019-03-14 PROCEDURE — 93798 PHYS/QHP OP CAR RHAB W/ECG: CPT

## 2019-03-15 ENCOUNTER — OTHER (OUTPATIENT)
Age: 69
End: 2019-03-15

## 2019-03-18 ENCOUNTER — APPOINTMENT (OUTPATIENT)
Dept: CARDIOLOGY | Facility: CLINIC | Age: 69
End: 2019-03-18
Payer: MEDICARE

## 2019-03-18 PROCEDURE — 93798 PHYS/QHP OP CAR RHAB W/ECG: CPT

## 2019-03-20 ENCOUNTER — APPOINTMENT (OUTPATIENT)
Dept: CARDIOLOGY | Facility: CLINIC | Age: 69
End: 2019-03-20
Payer: MEDICARE

## 2019-03-20 PROCEDURE — 93798 PHYS/QHP OP CAR RHAB W/ECG: CPT

## 2019-03-20 NOTE — H&P ADULT. - CIGARETTES, NUMBER OF YRS
Health Maintenance Due   Topic Date Due   • Shingles Vaccine (1 of 2) [birth year]1986       Patient is due for topics as listed above but is not proceeding with Immunization(s) Shingles at this time. Education provided for Immunization(s) Shingles      Unaddressed Risk Adjusted HCC Categories and Diagnoses      HCC 85 - Congestive Heart Failure   Unaddressed Dx:Hypertrophic Cardiomyopathy (Cms/Hcc)   - Vascular Disease   Unaddressed Dx:Mesenteric Ischemia (Cms/Hcc)   - Chronic Obstructive Pulmonary Disease   Unaddressed Dx:Chronic Obstructive Pulmonary Disease, Unspecified Copd Type (Cms/Hcc)         5

## 2019-03-21 ENCOUNTER — APPOINTMENT (OUTPATIENT)
Dept: CARDIOLOGY | Facility: CLINIC | Age: 69
End: 2019-03-21
Payer: MEDICARE

## 2019-03-21 PROCEDURE — 93798 PHYS/QHP OP CAR RHAB W/ECG: CPT

## 2019-03-25 ENCOUNTER — APPOINTMENT (OUTPATIENT)
Dept: CARDIOLOGY | Facility: CLINIC | Age: 69
End: 2019-03-25
Payer: MEDICARE

## 2019-03-25 PROCEDURE — 93798 PHYS/QHP OP CAR RHAB W/ECG: CPT

## 2019-03-25 NOTE — PROGRESS NOTE ADULT - PROBLEM SELECTOR PLAN 3
This patient is enrolled in the [facility] Care Transitions program and has active care navigation. This patient can be followed up by the care navigation team within 24 hours. To arrange close follow-up or to obtain additional clinical information about this patient, please call the contact number above. plastics & ID consulted  dressing change

## 2019-03-27 ENCOUNTER — APPOINTMENT (OUTPATIENT)
Dept: CARDIOLOGY | Facility: CLINIC | Age: 69
End: 2019-03-27
Payer: MEDICARE

## 2019-03-27 PROCEDURE — 93798 PHYS/QHP OP CAR RHAB W/ECG: CPT

## 2019-03-28 ENCOUNTER — APPOINTMENT (OUTPATIENT)
Dept: CARDIOLOGY | Facility: CLINIC | Age: 69
End: 2019-03-28
Payer: MEDICARE

## 2019-03-28 PROCEDURE — 93798 PHYS/QHP OP CAR RHAB W/ECG: CPT

## 2019-04-01 ENCOUNTER — APPOINTMENT (OUTPATIENT)
Dept: CARDIOLOGY | Facility: CLINIC | Age: 69
End: 2019-04-01
Payer: MEDICARE

## 2019-04-01 PROCEDURE — 93798 PHYS/QHP OP CAR RHAB W/ECG: CPT

## 2019-04-03 ENCOUNTER — APPOINTMENT (OUTPATIENT)
Dept: CARDIOLOGY | Facility: CLINIC | Age: 69
End: 2019-04-03
Payer: MEDICARE

## 2019-04-03 PROCEDURE — 93798 PHYS/QHP OP CAR RHAB W/ECG: CPT

## 2019-04-04 ENCOUNTER — APPOINTMENT (OUTPATIENT)
Dept: CARDIOLOGY | Facility: CLINIC | Age: 69
End: 2019-04-04
Payer: MEDICARE

## 2019-04-08 ENCOUNTER — APPOINTMENT (OUTPATIENT)
Dept: CARDIOLOGY | Facility: CLINIC | Age: 69
End: 2019-04-08
Payer: MEDICARE

## 2019-04-08 PROCEDURE — 93798 PHYS/QHP OP CAR RHAB W/ECG: CPT

## 2019-04-10 ENCOUNTER — APPOINTMENT (OUTPATIENT)
Dept: CARDIOLOGY | Facility: CLINIC | Age: 69
End: 2019-04-10
Payer: MEDICARE

## 2019-04-10 PROCEDURE — 93798 PHYS/QHP OP CAR RHAB W/ECG: CPT

## 2019-04-11 ENCOUNTER — APPOINTMENT (OUTPATIENT)
Dept: CARDIOLOGY | Facility: CLINIC | Age: 69
End: 2019-04-11
Payer: MEDICARE

## 2019-04-11 PROCEDURE — 93798 PHYS/QHP OP CAR RHAB W/ECG: CPT

## 2019-04-15 ENCOUNTER — APPOINTMENT (OUTPATIENT)
Dept: CARDIOLOGY | Facility: CLINIC | Age: 69
End: 2019-04-15
Payer: MEDICARE

## 2019-04-15 PROCEDURE — 93798 PHYS/QHP OP CAR RHAB W/ECG: CPT

## 2019-04-17 ENCOUNTER — APPOINTMENT (OUTPATIENT)
Dept: CARDIOLOGY | Facility: CLINIC | Age: 69
End: 2019-04-17
Payer: MEDICARE

## 2019-04-17 PROCEDURE — 93798 PHYS/QHP OP CAR RHAB W/ECG: CPT

## 2019-04-18 ENCOUNTER — APPOINTMENT (OUTPATIENT)
Dept: CARDIOLOGY | Facility: CLINIC | Age: 69
End: 2019-04-18
Payer: MEDICARE

## 2019-04-18 PROCEDURE — 93798 PHYS/QHP OP CAR RHAB W/ECG: CPT

## 2019-04-22 ENCOUNTER — APPOINTMENT (OUTPATIENT)
Dept: CARDIOLOGY | Facility: CLINIC | Age: 69
End: 2019-04-22
Payer: MEDICARE

## 2019-04-22 ENCOUNTER — OTHER (OUTPATIENT)
Age: 69
End: 2019-04-22

## 2019-04-22 PROCEDURE — 93798 PHYS/QHP OP CAR RHAB W/ECG: CPT

## 2019-04-24 ENCOUNTER — APPOINTMENT (OUTPATIENT)
Dept: CARDIOLOGY | Facility: CLINIC | Age: 69
End: 2019-04-24
Payer: MEDICARE

## 2019-04-24 PROCEDURE — 93798 PHYS/QHP OP CAR RHAB W/ECG: CPT

## 2019-04-24 NOTE — PROGRESS NOTE ADULT - PROBLEM SELECTOR PLAN 1
LM for patient to call back to clinic.    Continue tacrolimus, check daily tacro level at 0730  Continue prednisone 3mg daily, mepron 1500mg daily, voriconazole 200 BID.  Continue pepcid, calcium, MVI, sodium bicarb BID.  Continue asa and statin.  Check  daily mg level     waiting   CMV PCR

## 2019-04-25 ENCOUNTER — APPOINTMENT (OUTPATIENT)
Dept: CARDIOLOGY | Facility: CLINIC | Age: 69
End: 2019-04-25
Payer: MEDICARE

## 2019-04-25 PROCEDURE — 93798 PHYS/QHP OP CAR RHAB W/ECG: CPT

## 2019-04-26 ENCOUNTER — OUTPATIENT (OUTPATIENT)
Dept: OUTPATIENT SERVICES | Facility: HOSPITAL | Age: 69
LOS: 1 days | End: 2019-04-26
Payer: MEDICARE

## 2019-04-26 ENCOUNTER — APPOINTMENT (OUTPATIENT)
Dept: CARDIOLOGY | Facility: CLINIC | Age: 69
End: 2019-04-26
Payer: MEDICARE

## 2019-04-26 VITALS
OXYGEN SATURATION: 100 % | DIASTOLIC BLOOD PRESSURE: 90 MMHG | BODY MASS INDEX: 23.79 KG/M2 | TEMPERATURE: 97.3 F | HEART RATE: 109 BPM | SYSTOLIC BLOOD PRESSURE: 133 MMHG | WEIGHT: 157 LBS | HEIGHT: 68 IN

## 2019-04-26 DIAGNOSIS — A43.9 NOCARDIOSIS, UNSPECIFIED: ICD-10-CM

## 2019-04-26 DIAGNOSIS — Z98.890 OTHER SPECIFIED POSTPROCEDURAL STATES: Chronic | ICD-10-CM

## 2019-04-26 DIAGNOSIS — Z94.1 HEART TRANSPLANT STATUS: Chronic | ICD-10-CM

## 2019-04-26 LAB
ALBUMIN SERPL ELPH-MCNC: 4.6 G/DL
ALP BLD-CCNC: 116 U/L
ALT SERPL-CCNC: 23 U/L
ANION GAP SERPL CALC-SCNC: 17 MMOL/L
AST SERPL-CCNC: 28 U/L
BASOPHILS # BLD AUTO: 0.02 K/UL
BASOPHILS NFR BLD AUTO: 0.3 %
BILIRUB SERPL-MCNC: 0.7 MG/DL
BUN SERPL-MCNC: 29 MG/DL
CALCIUM SERPL-MCNC: 9.6 MG/DL
CHLORIDE SERPL-SCNC: 106 MMOL/L
CHOLEST SERPL-MCNC: 116 MG/DL
CHOLEST/HDLC SERPL: 1.9 RATIO
CO2 SERPL-SCNC: 16 MMOL/L
CREAT SERPL-MCNC: 1.6 MG/DL
EOSINOPHIL # BLD AUTO: 0.14 K/UL
EOSINOPHIL NFR BLD AUTO: 2.4 %
ESTIMATED AVERAGE GLUCOSE: 94 MG/DL
GLUCOSE SERPL-MCNC: 87 MG/DL
HBA1C MFR BLD HPLC: 4.9 %
HCT VFR BLD CALC: 37.8 %
HDLC SERPL-MCNC: 60 MG/DL
HGB BLD-MCNC: 11.7 G/DL
IMM GRANULOCYTES NFR BLD AUTO: 0.3 %
LDLC SERPL CALC-MCNC: 44 MG/DL
LYMPHOCYTES # BLD AUTO: 1.56 K/UL
LYMPHOCYTES NFR BLD AUTO: 26.4 %
MAGNESIUM SERPL-MCNC: 1.7 MG/DL
MAN DIFF?: NORMAL
MCHC RBC-ENTMCNC: 29.6 PG
MCHC RBC-ENTMCNC: 31 GM/DL
MCV RBC AUTO: 95.7 FL
MONOCYTES # BLD AUTO: 0.77 K/UL
MONOCYTES NFR BLD AUTO: 13.1 %
NEUTROPHILS # BLD AUTO: 3.39 K/UL
NEUTROPHILS NFR BLD AUTO: 57.5 %
PLATELET # BLD AUTO: 236 K/UL
POTASSIUM SERPL-SCNC: 5.2 MMOL/L
PROT SERPL-MCNC: 7.4 G/DL
PSA SERPL-MCNC: 4.08 NG/ML
RBC # BLD: 3.95 M/UL
RBC # FLD: 14.8 %
SODIUM SERPL-SCNC: 138 MMOL/L
TACROLIMUS SERPL-MCNC: 7.8 NG/ML
TRIGL SERPL-MCNC: 60 MG/DL
WBC # FLD AUTO: 5.9 K/UL

## 2019-04-26 PROCEDURE — 85027 COMPLETE CBC AUTOMATED: CPT

## 2019-04-26 PROCEDURE — 71250 CT THORAX DX C-: CPT | Mod: 26

## 2019-04-26 PROCEDURE — 99214 OFFICE O/P EST MOD 30 MIN: CPT

## 2019-04-26 PROCEDURE — 71250 CT THORAX DX C-: CPT

## 2019-04-26 PROCEDURE — 80053 COMPREHEN METABOLIC PANEL: CPT

## 2019-04-26 PROCEDURE — 83735 ASSAY OF MAGNESIUM: CPT

## 2019-04-29 ENCOUNTER — APPOINTMENT (OUTPATIENT)
Dept: CARDIOLOGY | Facility: CLINIC | Age: 69
End: 2019-04-29
Payer: MEDICARE

## 2019-04-29 PROCEDURE — 93798 PHYS/QHP OP CAR RHAB W/ECG: CPT

## 2019-04-29 NOTE — HISTORY OF PRESENT ILLNESS
[FreeTextEntry1] : Mr. Baez is a 69 year old man with past medical history of a nonischemic cardiomyopathy and chronic systolic heart failure s/p HM2 LVAD in June 2017 complicated by recurrent GI hemorrhage and possible pump thrombosis who underwent heart transplant on 2/23/18 with a hepatitis C positive donor. His course was complicated by bilateral IJ/subclavian thrombi, a persistent small right sided pleural effusion, as well as acute on chronic renal failure of unclear etiology. In addition, his post-operative course was complicated by graft dysfunction of unclear etiology and persistent C4d positive staining on endomyocardial biopsy with negative DSAs. He developed joshua evidence of graft dysfunction leading to treatment with plasmapheresis, IVIG, and rituximab. Subsequently in June of 2018 he was found to have an pneumonia, that was ultimately diagnosed as Nocardia for which he has been treated with Bactrim.\par \par He presents today for routine labs and visit.  He looks well and "feels great". He continues on oral Bactrim for ongoing treatment of Nocardia, with veltessa to manage the hyperkalemia. He has done well with cardiac rehab and has 2 more weeks. He walking with a cane and does not note any shortness of breath, but has ongoing limitations due to chronic right knee pain. In addition ongoing c/o left shoulder pain.  He has been participating in cardiac rehab without issue. He has no PND or orthopnea and no cough. He denies fevers or chills. He has no abdominal pain, increase in abdominal girth or swelling in the legs. He has no diarrhea or rashes.  a recent CT scan today noted the RU lobe nodule has resolved. \par EMB 2/8 1R/1A with normal filling pressures.

## 2019-04-29 NOTE — PHYSICAL EXAM
[General Appearance - Well Developed] : well developed [General Appearance - Well Nourished] : well nourished [Normal Conjunctiva] : the conjunctiva exhibited no abnormalities [Normal Oral Mucosa] : normal oral mucosa [Normal Oropharynx] : normal oropharynx [Respiration, Rhythm And Depth] : normal respiratory rhythm and effort [Auscultation Breath Sounds / Voice Sounds] : lungs were clear to auscultation bilaterally [Heart Rate And Rhythm] : heart rate and rhythm were normal [Heart Sounds] : normal S1 and S2 [Arterial Pulses Normal] : the arterial pulses were normal [Edema] : no peripheral edema present [Abdomen Soft] : soft [Bowel Sounds] : normal bowel sounds [Abdomen Tenderness] : non-tender [Nail Clubbing] : no clubbing of the fingernails [Abnormal Walk] : normal gait [Cyanosis, Localized] : no localized cyanosis [Skin Turgor] : normal skin turgor [] : no rash [Skin Color & Pigmentation] : normal skin color and pigmentation [Impaired Insight] : insight and judgment were intact [Oriented To Time, Place, And Person] : oriented to person, place, and time [No Anxiety] : not feeling anxious [FreeTextEntry1] : Regular, but tachycardic. III/VI diastolic murmur.

## 2019-04-29 NOTE — DISCUSSION/SUMMARY
[FreeTextEntry1] : - Immunosuppression: We will continue him on current dose of Prograf. Goal dose Prograf  level 8-10. He remains off of prednisone and Cellcept. 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 \par  Additional annual testing, including DEXA and cancer screening will be ordered (dermatology). \par \par - Hyperkalemia with CKD, stage III, currently controlled: He continues on Veltessa, fludrocortisone, and sodium bicarbonate. When Bactrim is discontinued, we will reevaluate his need. \par \par - Antibiotic prophylaxis:CMV (intermediate risk): Valcyte is discontinued. He will have repeat CMV today and have his annual CDC high risk testing. \par \par -Nocardia pneumonia: Continue current oral Bactrim per ID. Duration of therapy per Dr Lujan\par \par - CAV prophylaxis/hyperlipidemia: Continue ASA 81mg daily and pravachol 20 mg daily. We will order a lipid profile. \par \par - Chronic hepatitis C infection: Completed treatment with undetectable viral load. He will need routine monitoring per protocol. \par \par - Osteoporosis prophylaxis: Continue calcium + vit D 2 tabs BID.\par \par - Hypomagnesemia: Continue magnesium oxide 400 mg daily.'\par \par Ongoing follow up with routine labs.  RHC/EMB Q 3months, next 5/22\par

## 2019-04-29 NOTE — ASSESSMENT
[FreeTextEntry1] : Mr. Baez is a 69 year old man with history of NICM, s/p heart transplantation on 2/23/18. 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 Early post transplant he received therapy with IVIG, plasmapheresis, and rituximab with course complicated by development of Nocardia pulmonary infection, for which he is currently on therapy with Bactrim. He is one year post transplant and doing very well.

## 2019-04-30 ENCOUNTER — OTHER (OUTPATIENT)
Age: 69
End: 2019-04-30

## 2019-04-30 LAB
CMV DNA SPEC QL NAA+PROBE: NOT DETECTED
CMVPCR LOG: NOT DETECTED LOGIU/ML

## 2019-05-01 ENCOUNTER — APPOINTMENT (OUTPATIENT)
Dept: CARDIOLOGY | Facility: CLINIC | Age: 69
End: 2019-05-01
Payer: MEDICARE

## 2019-05-01 PROCEDURE — 93798 PHYS/QHP OP CAR RHAB W/ECG: CPT

## 2019-05-02 ENCOUNTER — APPOINTMENT (OUTPATIENT)
Dept: CARDIOLOGY | Facility: CLINIC | Age: 69
End: 2019-05-02
Payer: MEDICARE

## 2019-05-02 PROCEDURE — 93798 PHYS/QHP OP CAR RHAB W/ECG: CPT

## 2019-05-06 ENCOUNTER — APPOINTMENT (OUTPATIENT)
Dept: CARDIOLOGY | Facility: CLINIC | Age: 69
End: 2019-05-06
Payer: MEDICARE

## 2019-05-06 PROCEDURE — 93798 PHYS/QHP OP CAR RHAB W/ECG: CPT

## 2019-05-08 ENCOUNTER — APPOINTMENT (OUTPATIENT)
Dept: CARDIOLOGY | Facility: CLINIC | Age: 69
End: 2019-05-08
Payer: MEDICARE

## 2019-05-08 PROCEDURE — 93798 PHYS/QHP OP CAR RHAB W/ECG: CPT

## 2019-05-22 ENCOUNTER — OUTPATIENT (OUTPATIENT)
Dept: OUTPATIENT SERVICES | Facility: HOSPITAL | Age: 69
LOS: 1 days | End: 2019-05-22
Payer: MEDICARE

## 2019-05-22 ENCOUNTER — APPOINTMENT (OUTPATIENT)
Dept: RADIOLOGY | Facility: IMAGING CENTER | Age: 69
End: 2019-05-22
Payer: MEDICARE

## 2019-05-22 DIAGNOSIS — Z94.1 HEART TRANSPLANT STATUS: ICD-10-CM

## 2019-05-22 DIAGNOSIS — Z94.1 HEART TRANSPLANT STATUS: Chronic | ICD-10-CM

## 2019-05-22 DIAGNOSIS — Z98.890 OTHER SPECIFIED POSTPROCEDURAL STATES: Chronic | ICD-10-CM

## 2019-05-22 PROCEDURE — 77080 DXA BONE DENSITY AXIAL: CPT

## 2019-05-22 PROCEDURE — 77080 DXA BONE DENSITY AXIAL: CPT | Mod: 26

## 2019-05-27 NOTE — PATIENT PROFILE ADULT. - ATTEMPT TO OOB
Results reported to patient--grossly benign, labs wnl, xr clear  Pt. reports feeling better after meds  pt. agrees to f/u with primary care outpt.  pt. understands to return to ED if symptoms worsen; will d/c no

## 2019-05-29 ENCOUNTER — APPOINTMENT (OUTPATIENT)
Dept: CARDIOLOGY | Facility: CLINIC | Age: 69
End: 2019-05-29
Payer: MEDICARE

## 2019-05-29 ENCOUNTER — APPOINTMENT (OUTPATIENT)
Dept: CV DIAGNOSITCS | Facility: HOSPITAL | Age: 69
End: 2019-05-29

## 2019-05-29 ENCOUNTER — OUTPATIENT (OUTPATIENT)
Dept: OUTPATIENT SERVICES | Facility: HOSPITAL | Age: 69
LOS: 1 days | End: 2019-05-29
Payer: MEDICARE

## 2019-05-29 ENCOUNTER — RESULT REVIEW (OUTPATIENT)
Age: 69
End: 2019-05-29

## 2019-05-29 VITALS
HEIGHT: 68 IN | SYSTOLIC BLOOD PRESSURE: 127 MMHG | WEIGHT: 164.02 LBS | TEMPERATURE: 97 F | DIASTOLIC BLOOD PRESSURE: 85 MMHG | RESPIRATION RATE: 16 BRPM | HEART RATE: 99 BPM | OXYGEN SATURATION: 95 %

## 2019-05-29 VITALS
DIASTOLIC BLOOD PRESSURE: 85 MMHG | TEMPERATURE: 97.4 F | HEIGHT: 68 IN | WEIGHT: 164 LBS | RESPIRATION RATE: 18 BRPM | OXYGEN SATURATION: 95 % | BODY MASS INDEX: 24.86 KG/M2 | HEART RATE: 99 BPM | SYSTOLIC BLOOD PRESSURE: 127 MMHG

## 2019-05-29 DIAGNOSIS — Z98.890 OTHER SPECIFIED POSTPROCEDURAL STATES: Chronic | ICD-10-CM

## 2019-05-29 DIAGNOSIS — Z94.1 HEART TRANSPLANT STATUS: ICD-10-CM

## 2019-05-29 DIAGNOSIS — Z94.1 HEART TRANSPLANT STATUS: Chronic | ICD-10-CM

## 2019-05-29 LAB
ALBUMIN SERPL ELPH-MCNC: 4.4 G/DL — SIGNIFICANT CHANGE UP (ref 3.3–5)
ALP SERPL-CCNC: 97 U/L — SIGNIFICANT CHANGE UP (ref 40–120)
ALT FLD-CCNC: 19 U/L — SIGNIFICANT CHANGE UP (ref 10–45)
ANION GAP SERPL CALC-SCNC: 12 MMOL/L — SIGNIFICANT CHANGE UP (ref 5–17)
AST SERPL-CCNC: 23 U/L — SIGNIFICANT CHANGE UP (ref 10–40)
BASOPHILS # BLD AUTO: 0 K/UL — SIGNIFICANT CHANGE UP (ref 0–0.2)
BASOPHILS NFR BLD AUTO: 0.3 % — SIGNIFICANT CHANGE UP (ref 0–2)
BILIRUB SERPL-MCNC: 0.6 MG/DL — SIGNIFICANT CHANGE UP (ref 0.2–1.2)
BUN SERPL-MCNC: 26 MG/DL — HIGH (ref 7–23)
CALCIUM SERPL-MCNC: 9.1 MG/DL — SIGNIFICANT CHANGE UP (ref 8.4–10.5)
CHLORIDE SERPL-SCNC: 105 MMOL/L — SIGNIFICANT CHANGE UP (ref 96–108)
CO2 SERPL-SCNC: 21 MMOL/L — LOW (ref 22–31)
CREAT SERPL-MCNC: 1.74 MG/DL — HIGH (ref 0.5–1.3)
EOSINOPHIL # BLD AUTO: 0.1 K/UL — SIGNIFICANT CHANGE UP (ref 0–0.5)
EOSINOPHIL NFR BLD AUTO: 1.3 % — SIGNIFICANT CHANGE UP (ref 0–6)
GLUCOSE SERPL-MCNC: 86 MG/DL — SIGNIFICANT CHANGE UP (ref 70–99)
HCT VFR BLD CALC: 34.6 % — LOW (ref 39–50)
HGB BLD-MCNC: 11.7 G/DL — LOW (ref 13–17)
LYMPHOCYTES # BLD AUTO: 2 K/UL — SIGNIFICANT CHANGE UP (ref 1–3.3)
LYMPHOCYTES # BLD AUTO: 31.7 % — SIGNIFICANT CHANGE UP (ref 13–44)
MAGNESIUM SERPL-MCNC: 1.7 MG/DL — SIGNIFICANT CHANGE UP (ref 1.6–2.6)
MCHC RBC-ENTMCNC: 31.9 PG — SIGNIFICANT CHANGE UP (ref 27–34)
MCHC RBC-ENTMCNC: 33.8 GM/DL — SIGNIFICANT CHANGE UP (ref 32–36)
MCV RBC AUTO: 94.5 FL — SIGNIFICANT CHANGE UP (ref 80–100)
MONOCYTES # BLD AUTO: 0.6 K/UL — SIGNIFICANT CHANGE UP (ref 0–0.9)
MONOCYTES NFR BLD AUTO: 10.3 % — SIGNIFICANT CHANGE UP (ref 2–14)
NEUTROPHILS # BLD AUTO: 3.5 K/UL — SIGNIFICANT CHANGE UP (ref 1.8–7.4)
NEUTROPHILS NFR BLD AUTO: 56.4 % — SIGNIFICANT CHANGE UP (ref 43–77)
PLATELET # BLD AUTO: 234 K/UL — SIGNIFICANT CHANGE UP (ref 150–400)
POTASSIUM SERPL-MCNC: 4.8 MMOL/L — SIGNIFICANT CHANGE UP (ref 3.5–5.3)
POTASSIUM SERPL-SCNC: 4.8 MMOL/L — SIGNIFICANT CHANGE UP (ref 3.5–5.3)
PROT SERPL-MCNC: 7.6 G/DL — SIGNIFICANT CHANGE UP (ref 6–8.3)
RBC # BLD: 3.67 M/UL — LOW (ref 4.2–5.8)
RBC # FLD: 15.1 % — HIGH (ref 10.3–14.5)
SODIUM SERPL-SCNC: 138 MMOL/L — SIGNIFICANT CHANGE UP (ref 135–145)
TACROLIMUS SERPL-MCNC: 7.9 NG/ML — SIGNIFICANT CHANGE UP
WBC # BLD: 6.2 K/UL — SIGNIFICANT CHANGE UP (ref 3.8–10.5)
WBC # FLD AUTO: 6.2 K/UL — SIGNIFICANT CHANGE UP (ref 3.8–10.5)

## 2019-05-29 PROCEDURE — 88342 IMHCHEM/IMCYTCHM 1ST ANTB: CPT

## 2019-05-29 PROCEDURE — 88346 IMFLUOR 1ST 1ANTB STAIN PX: CPT | Mod: 26

## 2019-05-29 PROCEDURE — 99152 MOD SED SAME PHYS/QHP 5/>YRS: CPT

## 2019-05-29 PROCEDURE — 88307 TISSUE EXAM BY PATHOLOGIST: CPT

## 2019-05-29 PROCEDURE — 88346 IMFLUOR 1ST 1ANTB STAIN PX: CPT

## 2019-05-29 PROCEDURE — 88307 TISSUE EXAM BY PATHOLOGIST: CPT | Mod: 26

## 2019-05-29 PROCEDURE — 88342 IMHCHEM/IMCYTCHM 1ST ANTB: CPT | Mod: 26

## 2019-05-29 PROCEDURE — 93505 ENDOMYOCARDIAL BIOPSY: CPT | Mod: 26,GC

## 2019-05-29 PROCEDURE — 83735 ASSAY OF MAGNESIUM: CPT

## 2019-05-29 PROCEDURE — 99215 OFFICE O/P EST HI 40 MIN: CPT

## 2019-05-29 PROCEDURE — 93321 DOPPLER ECHO F-UP/LMTD STD: CPT

## 2019-05-29 PROCEDURE — 85027 COMPLETE CBC AUTOMATED: CPT

## 2019-05-29 PROCEDURE — 80053 COMPREHEN METABOLIC PANEL: CPT

## 2019-05-29 PROCEDURE — C1817: CPT

## 2019-05-29 PROCEDURE — 93308 TTE F-UP OR LMTD: CPT

## 2019-05-29 PROCEDURE — C1894: CPT

## 2019-05-29 PROCEDURE — C1884: CPT

## 2019-05-29 PROCEDURE — 80197 ASSAY OF TACROLIMUS: CPT

## 2019-05-29 PROCEDURE — 99153 MOD SED SAME PHYS/QHP EA: CPT

## 2019-05-29 PROCEDURE — 99152 MOD SED SAME PHYS/QHP 5/>YRS: CPT | Mod: GC

## 2019-05-29 PROCEDURE — 93005 ELECTROCARDIOGRAM TRACING: CPT

## 2019-05-29 PROCEDURE — 93321 DOPPLER ECHO F-UP/LMTD STD: CPT | Mod: 26

## 2019-05-29 PROCEDURE — 93505 ENDOMYOCARDIAL BIOPSY: CPT

## 2019-05-29 PROCEDURE — 93308 TTE F-UP OR LMTD: CPT | Mod: 26

## 2019-05-29 PROCEDURE — 93010 ELECTROCARDIOGRAM REPORT: CPT

## 2019-05-29 RX ORDER — VALGANCICLOVIR 450 MG/1
1 TABLET, FILM COATED ORAL
Qty: 0 | Refills: 0 | DISCHARGE

## 2019-05-29 RX ORDER — SODIUM POLYSTYRENE SULFONATE 4.1 MEQ/G
1 POWDER, FOR SUSPENSION ORAL
Qty: 0 | Refills: 0 | DISCHARGE

## 2019-05-29 NOTE — H&P CARDIOLOGY - HISTORY OF PRESENT ILLNESS
Mr. Baez is a 69 year old man with PMHx of a nonischemic cardiomyopathy and chronic systolic heart failure s/p HM2 LVAD in June 2017 complicated by recurrent GI hemorrhage and possible pump thrombosis who underwent heart transplant on 2/23/18 (CMV D-/R+ and Toxo D-/R+, Hep C+ heart). His course was complicated by bilateral IJ/subclavian thrombi, a persistent small right sided pleural effusion, as well as acute on chronic renal failure of unclear etiology. In addition, his post-operative course was complicated by graft dysfunction of unclear etiology and persistent C4d positive staining on endomyocardial biopsy with negative DSAs. He developed joshua evidence of graft dysfunction leading to treatment with plasmapheresis, IVIG, and rituximab.     In June 2018, he was admitted with a left sided infiltrate, fevers, and chills. CT guided biopsy was unremarkable. He was treated empirically with antibiotics and discharged on voriconazole for presumed fungal pneumonia. He was readmitted on 8/14 through 8/29 and again in September with recurrent signs and symptoms of infection. CT scan showed a new right upper lobe infiltrate and he was ultimately diagnosed with Nocardia pneumonia and has been on treatment with Imipenem. He was also found to have evidence of C. diff and was treated with oral vancomycin. C diff toxin positive c/b colitis on last RHC w/biopsy noted to be febrile and swelling s/p hospitalization for PNA and JP continues on oral ABX and Vanco taper with Rt arm PICC line removed 11/7/18    -Discharged from [facility] and was transitioned from IV antibiotics to oral Bactrim for ongoing treatment of Nocardia. He was simultaneously started on Veltessa given prior episodes of hyperkalemia. He notes that he can walk without shortness of breath, but is primarily limited by ongoing chronic right knee pain. He was previously scheduled for RHC, however deferred due to hyperkalemia. He was admitted and treated- He received Kayexalate with an improvement in potassium level.   - Here today for RHC. He denies PND or orthopnea and no cough. He denies fevers or chills. He has no abdominal pain, increase in abdominal girth or swelling in the legs. He has no diarrhea or rashes.     < from: Transthoracic Echocardiogram (02.08.19 @ 13:14) >  Conclusions:  1. Mild segmental left ventricular systolic dysfunction.  Inferior,inferolateral  wall hypokinesis. Septal motion  consistent with cardiac surgery.  2. Strain imaging was performed on the Ed EPIQ with an  average HR of 106 bpm and a BP of 136/100. Global L.  Strain= -15.8%.  3. Decreased right ventricular systolic function.  TAPSE  0.8 cm.  Discussed with Dr. Stahl.  ------------------------------------------------------------------------  Confirmed on  2/8/2019 - 15:14:10 by Patric Arroyo M.D.  ------------------------------------------------------------------------    < end of copied text >  < from: Transthoracic Echocardiogram (02.08.19 @ 13:14) >  Conclusions:  1. Mild segmental left ventricular systolic dysfunction.  Inferior,inferolateral  wall hypokinesis. Septal motion  consistent with cardiac surgery.  2. Strain imaging was performed on the Ed EPIQ with an  average HR of 106 bpm and a BP of 136/100. Global L.  Strain= -15.8%.  3. Decreased right ventricular systolic function.  TAPSE  0.8 cm.  Discussed with Dr. Stahl.  ------------------------------------------------------------------------  Confirmed on  2/8/2019 - 15:14:10 by Patric Arroyo M.D.  ------------------------------------------------------------------------    < end of copied text >  < from: Cardiac Cath Lab - Adult (02.08.19 @ 10:48) >  VENTRICLES: No left ventriculogram was performed.  CORONARY VESSELS: The coronary circulation is right dominant.  LM:   --  LM: Normal.  LAD:   --  LAD: Normal.  --  Distal LAD: A fistula to the left ventricle was identified. Was  previously visualizedon first - post transplant cath and appears mostly  unchanged.  CX:   --  Circumflex: Normal.  --  OM1: This vessel was poorly visualized.  RCA:   --  RCA: Normal.  COMPLICATIONS: There were no complications.  DIAGNOSTIC IMPRESSIONS: Normal appearingcoronary arteries minus the  already known LAD-LV fistula. IVUS of the LM and prox/mid LAD normal.  Normal right heart cath.  DIAGNOSTIC RECOMMENDATIONS: f/u with HF  Prepared and signed by  Chandu Carpenter M.D.  Signed 02/09/2019 18:25:30    < end of copied text >

## 2019-05-29 NOTE — HISTORY OF PRESENT ILLNESS
[FreeTextEntry1] : Mr. Baez is a 69 year old man with past medical history of a nonischemic cardiomyopathy and chronic systolic heart failure s/p HM2 LVAD in June 2017 complicated by recurrent GI hemorrhage and possible pump thrombosis who underwent heart transplant on 2/23/18 with a hepatitis C positive donor. His course was complicated by bilateral IJ/subclavian thrombi, a persistent small right sided pleural effusion, as well as acute on chronic renal failure of unclear etiology. In addition, his post-operative course was complicated by graft dysfunction of unclear etiology and persistent C4d positive staining on endomyocardial biopsy with negative DSAs. He developed joshua evidence of graft dysfunction leading to treatment with plasmapheresis, IVIG, and rituximab. Subsequently in June of 2018 he was found to have an pneumonia, that was ultimately diagnosed as Nocardia\par \par He presents today for routine RHC with biopsy. He currently feels well. He continues on oral Bactrim for ongoing treatment of Nocardia. He notes that he can walk without shortness of breath, but has ongoing limitations due to chronic right knee pain, which has not changed. He has no PND or orthopnea and no cough. He denies fevers or chills. He has no abdominal pain, increase in abdominal girth or swelling in the legs. He has no diarrhea or rashes. \par \par He had a right heart catheterization today, which showed normal filling pressures and cardiac output. \par \par I reviewed recent labs from April, which were notable for elevated PSA of 4 and bicarbonate of 16. LDL cholesterol was at goal.

## 2019-05-29 NOTE — PHYSICAL EXAM
[General Appearance - Well Developed] : well developed [General Appearance - Well Nourished] : well nourished [Normal Conjunctiva] : the conjunctiva exhibited no abnormalities [Normal Oral Mucosa] : normal oral mucosa [Normal Oropharynx] : normal oropharynx [Respiration, Rhythm And Depth] : normal respiratory rhythm and effort [Auscultation Breath Sounds / Voice Sounds] : lungs were clear to auscultation bilaterally [Heart Rate And Rhythm] : heart rate and rhythm were normal [Arterial Pulses Normal] : the arterial pulses were normal [Heart Sounds] : normal S1 and S2 [Edema] : no peripheral edema present [Bowel Sounds] : normal bowel sounds [Abdomen Soft] : soft [Abdomen Tenderness] : non-tender [Abnormal Walk] : normal gait [Cyanosis, Localized] : no localized cyanosis [Nail Clubbing] : no clubbing of the fingernails [Skin Color & Pigmentation] : normal skin color and pigmentation [Skin Turgor] : normal skin turgor [] : no rash [No Anxiety] : not feeling anxious [Oriented To Time, Place, And Person] : oriented to person, place, and time [Impaired Insight] : insight and judgment were intact [FreeTextEntry1] : Regular, but tachycardic. III/VI diastolic murmur.

## 2019-05-29 NOTE — REASON FOR VISIT
[Follow-Up - Clinic] : a clinic follow-up of [Heart Transplant] : heart transplant [FreeTextEntry1] : immunosuppressed status

## 2019-05-29 NOTE — H&P CARDIOLOGY - PSH
AICD (Automatic Cardioverter/Defibrillator) Present  [place] with 1 [place] lead4/1/09- explanted and replaced with ISIGN [employer] 2 leads on 9/2/09  H/O heart transplant  2/2018  History of Prior Ablation Treatment  for afib  S/P right heart catheterization  biopsy multiple  Status post left hip replacement

## 2019-05-29 NOTE — DISCUSSION/SUMMARY
[FreeTextEntry1] : - Immunosuppression: We will continue him on current dose of Prograf. Goal dose Prograf  level 8-10. He remains off of prednisone and Cellcept.\par \par - S/P Heart transplantation: He has no evidence of clinically significant graft dysfunction or congestive heart failure on exam. 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 He has no significant CAV. \par \par - Hyperkalemia with CKD, stage III, currently controlled: He continues on Veltessa, fludrocortisone, and sodium bicarbonate. When Bactrim is discontinued, we will reevaluate his need. \par \par - Antibiotic prophylaxis:CMV (intermediate risk): None currently required. \par \par -Nocardia pneumonia: Continue current oral Bactrim per ID. Will discuss with Dr. Lujan duration of therapy. \par \par - CAV prophylaxis/hyperlipidemia: Continue ASA 81mg daily and pravachol 20 mg daily.\par \par - Stress ulcer prophylaxis: Continue famotidine 20mg daily.\par \par - Osteoporosis prophylaxis: Continue calcium + vit D 2 tabs BID.\par \par - Hypomagnesemia: Continue magnesium oxide 1200 mg daily.\par \par - Elevated PSA: We will refer to urology for evaluation. \par \par - Chronic right knee pain, osteoarthritis: We will refer to orthopedics for evaluation.

## 2019-05-29 NOTE — H&P CARDIOLOGY - PMH
CHF (Congestive Heart Failure)    DVT of upper extremity (deep vein thrombosis)    Former smoker    GIB (gastrointestinal bleeding)    Hepatitis C virus    HLD (hyperlipidemia)    HTN    Knee pain, right    Non-Ischemic Cardiomyopathy    PAF (paroxysmal atrial fibrillation)  on xarelto  SVT (Supraventricular Tachycardia)    Ventricular fibrillation  s/p AICD

## 2019-05-29 NOTE — ASSESSMENT
[FreeTextEntry1] : Mr. Baez is a 69 year old man with history of NICM, s/p heart transplantation on 2/23/18. 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 Early post transplant he received therapy with IVIG, plasmapheresis, and rituximab with course complicated by development of Nocardia pulmonary infection, for which he is currently on therapy with Bactrim.

## 2019-05-30 ENCOUNTER — RESULT REVIEW (OUTPATIENT)
Age: 69
End: 2019-05-30

## 2019-05-30 NOTE — ED CLERICAL - DIVISION
[facility]... 

## 2019-05-31 NOTE — DISCHARGE NOTE ADULT - NS AS DC AMI YN
-- Message is from the [facility]--    Patient is requesting a medication refill - medication is on active list, but patient is requesting a change to the medication prescription    Change requested: Patient stated medication not covered by insurance, please contact to discuss alternate    RX Name and Dose:   dulaglutide (TRULICITY) 0.75 MG/0.5ML pen-injector    Duration: 30 days    Pharmacy  [facility] - [address]    Patient confirmed the above pharmacy as correct?  Yes    Caller Information       Type Contact Phone    05/29/2019 12:21 PM Phone (Incoming) Bashir Correa (Self) [phone] (M)          Alternative phone number: none    Turnaround time given to caller:   \"This message will be sent to [state Provider's name]. The clinical team will fulfill your request as soon as they review your message.\"  
-- Message is from the [facility]--    Patient is requesting a medication refill - medication is on active list, but patient is requesting a change to the medication prescription    Change requested: Pt needs script to be changed to the brand one touch ultra 2 because his insurance is no longer covering diego contour.    RX Name and Dose:   blood glucose (DIEGO CONTOUR TEST) test strip    Duration: 90 days    Pharmacy  [facility] - [address]    Patient confirmed the above pharmacy as correct?  Yes    Caller Information       Type Contact Phone    05/29/2019 12:21 PM Phone (Incoming) Bashir Correa (Self) [phone] (M)          Alternative phone number: n/a    Turnaround time given to caller:   \"This message will be sent to [state Provider's name]. The clinical team will fulfill your request as soon as they review your message.\"  
Please follow up with pt once new script is sent to the pharmacy.  
Prescription sent, patient informed  
Refill sent    
no

## 2019-06-03 ENCOUNTER — LABORATORY RESULT (OUTPATIENT)
Age: 69
End: 2019-06-03

## 2019-06-03 ENCOUNTER — APPOINTMENT (OUTPATIENT)
Dept: INFECTIOUS DISEASE | Facility: CLINIC | Age: 69
End: 2019-06-03
Payer: MEDICARE

## 2019-06-03 VITALS — SYSTOLIC BLOOD PRESSURE: 147 MMHG | DIASTOLIC BLOOD PRESSURE: 99 MMHG

## 2019-06-03 VITALS
OXYGEN SATURATION: 97 % | DIASTOLIC BLOOD PRESSURE: 98 MMHG | BODY MASS INDEX: 24.94 KG/M2 | TEMPERATURE: 97.1 F | WEIGHT: 164 LBS | SYSTOLIC BLOOD PRESSURE: 157 MMHG | HEART RATE: 120 BPM

## 2019-06-03 PROCEDURE — 99214 OFFICE O/P EST MOD 30 MIN: CPT | Mod: 25

## 2019-06-03 PROCEDURE — G0009: CPT

## 2019-06-03 PROCEDURE — 90670 PCV13 VACCINE IM: CPT

## 2019-06-04 ENCOUNTER — RX CHANGE (OUTPATIENT)
Age: 69
End: 2019-06-04

## 2019-06-04 PROBLEM — M25.561 KNEE PAIN, RIGHT: Status: ACTIVE | Noted: 2019-06-04

## 2019-06-05 ENCOUNTER — APPOINTMENT (OUTPATIENT)
Dept: ORTHOPEDIC SURGERY | Facility: CLINIC | Age: 69
End: 2019-06-05
Payer: MEDICARE

## 2019-06-05 DIAGNOSIS — M17.11 UNILATERAL PRIMARY OSTEOARTHRITIS, RIGHT KNEE: ICD-10-CM

## 2019-06-05 DIAGNOSIS — M25.561 PAIN IN RIGHT KNEE: ICD-10-CM

## 2019-06-05 PROCEDURE — 73564 X-RAY EXAM KNEE 4 OR MORE: CPT | Mod: RT

## 2019-06-05 PROCEDURE — 20610 DRAIN/INJ JOINT/BURSA W/O US: CPT | Mod: RT

## 2019-06-05 PROCEDURE — 99204 OFFICE O/P NEW MOD 45 MIN: CPT | Mod: 25

## 2019-06-07 ENCOUNTER — OTHER (OUTPATIENT)
Age: 69
End: 2019-06-07

## 2019-06-11 LAB
ALBUMIN SERPL ELPH-MCNC: 4.3 G/DL
ALP BLD-CCNC: 95 U/L
ALT SERPL-CCNC: 32 U/L
ANION GAP SERPL CALC-SCNC: 8 MMOL/L
AST SERPL-CCNC: 25 U/L
BASOPHILS # BLD AUTO: 0.02 K/UL
BASOPHILS NFR BLD AUTO: 0.3 %
BILIRUB SERPL-MCNC: 0.6 MG/DL
BUN SERPL-MCNC: 28 MG/DL
CALCIUM SERPL-MCNC: 9.2 MG/DL
CHLORIDE SERPL-SCNC: 110 MMOL/L
CO2 SERPL-SCNC: 23 MMOL/L
CREAT SERPL-MCNC: 1.48 MG/DL
EOSINOPHIL # BLD AUTO: 0.13 K/UL
EOSINOPHIL NFR BLD AUTO: 1.7 %
GLUCOSE SERPL-MCNC: 93 MG/DL
HCT VFR BLD CALC: 37.3 %
HGB BLD-MCNC: 11.6 G/DL
IMM GRANULOCYTES NFR BLD AUTO: 1.4 %
LYMPHOCYTES # BLD AUTO: 2.34 K/UL
LYMPHOCYTES NFR BLD AUTO: 30.8 %
MAGNESIUM SERPL-MCNC: 1.8 MG/DL
MAN DIFF?: NORMAL
MCHC RBC-ENTMCNC: 30.1 PG
MCHC RBC-ENTMCNC: 31.1 GM/DL
MCV RBC AUTO: 96.6 FL
MONOCYTES # BLD AUTO: 0.87 K/UL
MONOCYTES NFR BLD AUTO: 11.4 %
NEUTROPHILS # BLD AUTO: 4.13 K/UL
NEUTROPHILS NFR BLD AUTO: 54.4 %
PLATELET # BLD AUTO: 238 K/UL
POTASSIUM SERPL-SCNC: 5.3 MMOL/L
PROT SERPL-MCNC: 6.9 G/DL
RBC # BLD: 3.86 M/UL
RBC # FLD: 15.9 %
SODIUM SERPL-SCNC: 141 MMOL/L
TACROLIMUS SERPL-MCNC: 8 NG/ML
WBC # FLD AUTO: 7.6 K/UL

## 2019-06-12 ENCOUNTER — RESULT REVIEW (OUTPATIENT)
Age: 69
End: 2019-06-12

## 2019-06-17 ENCOUNTER — APPOINTMENT (OUTPATIENT)
Dept: UROLOGY | Facility: CLINIC | Age: 69
End: 2019-06-17
Payer: MEDICARE

## 2019-06-17 VITALS
HEART RATE: 111 BPM | DIASTOLIC BLOOD PRESSURE: 88 MMHG | WEIGHT: 162 LBS | RESPIRATION RATE: 16 BRPM | BODY MASS INDEX: 24.55 KG/M2 | TEMPERATURE: 97.5 F | SYSTOLIC BLOOD PRESSURE: 132 MMHG | HEIGHT: 68 IN

## 2019-06-17 PROCEDURE — 99203 OFFICE O/P NEW LOW 30 MIN: CPT

## 2019-06-17 NOTE — HISTORY OF PRESENT ILLNESS
[FreeTextEntry1] : 69 year old man with past medical history of a nonischemic cardiomyopathy and chronic systolic heart failure s/p HM2 LVAD in June 2017 complicated by recurrent GI hemorrhage and possible pump thrombosis who underwent heart transplant on 2/23/18 with a hepatitis C positive donor.\par S/p hip replacement.\par \par Recently seen by Dr. MARISSA Stahl, his cardiologist, who noted that his PSA was 4.08 in Feb 2019.\par Nocturia x3 but denies any difficulty voiding.\par Denies intermittency.\par Repeat PSA today.

## 2019-06-17 NOTE — PHYSICAL EXAM
[Penis Abnormality] : normal uncircumcised penis [Rectal Exam - Rectum] : digital rectal exam was normal [No Prostate Nodules] : no prostate nodules [Prostate Size ___ gm] : prostate size [unfilled] gm [FreeTextEntry1] : smooth and regular

## 2019-06-17 NOTE — REVIEW OF SYSTEMS
[Negative] : Heme/Lymph [Wake up at night to urinate  How many times?  ___] : wakes up to urinate [unfilled] times during the night [see HPI] : see HPI [Joint Swelling] : joint swelling [Joint Pain] : joint pain [Limb Swelling] : limb swelling [Difficulty Walking] : difficulty walking

## 2019-06-17 NOTE — ASSESSMENT
[FreeTextEntry1] : Recently seen by Dr. MARISSA Stahl, his cardiologist, who noted that his PSA was 4.08 in Feb 2019.\par Nocturia x3 but denies any difficulty voiding.\par Denies intermittency.\par Repeat PSA today.

## 2019-06-17 NOTE — ADDENDUM
[FreeTextEntry1] :  I, Alejandrina Bell, acted solely as a scribe for Dr. Toney Coles. The documentation recorded by the scribe accurately reflects the service I personally performed and the decision by me.\par

## 2019-06-20 ENCOUNTER — OTHER (OUTPATIENT)
Age: 69
End: 2019-06-20

## 2019-06-21 LAB — PSA SERPL-MCNC: 4.35 NG/ML

## 2019-06-27 LAB
ALBUMIN SERPL ELPH-MCNC: 4.5 G/DL
ALP BLD-CCNC: 94 U/L
ALT SERPL-CCNC: 20 U/L
ANION GAP SERPL CALC-SCNC: 11 MMOL/L
AST SERPL-CCNC: 23 U/L
BASOPHILS # BLD AUTO: 0.01 K/UL
BASOPHILS NFR BLD AUTO: 0.2 %
BILIRUB SERPL-MCNC: 0.6 MG/DL
BUN SERPL-MCNC: 29 MG/DL
CALCIUM SERPL-MCNC: 9 MG/DL
CHLORIDE SERPL-SCNC: 107 MMOL/L
CO2 SERPL-SCNC: 21 MMOL/L
CREAT SERPL-MCNC: 1.68 MG/DL
EOSINOPHIL # BLD AUTO: 0.05 K/UL
EOSINOPHIL NFR BLD AUTO: 0.8 %
GLUCOSE SERPL-MCNC: 90 MG/DL
HCT VFR BLD CALC: 37 %
HGB BLD-MCNC: 11.4 G/DL
IMM GRANULOCYTES NFR BLD AUTO: 0.2 %
LYMPHOCYTES # BLD AUTO: 1.59 K/UL
LYMPHOCYTES NFR BLD AUTO: 24.1 %
MAGNESIUM SERPL-MCNC: 1.8 MG/DL
MAN DIFF?: NORMAL
MCHC RBC-ENTMCNC: 30.2 PG
MCHC RBC-ENTMCNC: 30.8 GM/DL
MCV RBC AUTO: 97.9 FL
MONOCYTES # BLD AUTO: 0.82 K/UL
MONOCYTES NFR BLD AUTO: 12.4 %
NEUTROPHILS # BLD AUTO: 4.12 K/UL
NEUTROPHILS NFR BLD AUTO: 62.3 %
PLATELET # BLD AUTO: 211 K/UL
POTASSIUM SERPL-SCNC: 4.5 MMOL/L
PROT SERPL-MCNC: 6.9 G/DL
RBC # BLD: 3.78 M/UL
RBC # FLD: 15.9 %
SODIUM SERPL-SCNC: 139 MMOL/L
WBC # FLD AUTO: 6.6 K/UL

## 2019-06-28 LAB — TACROLIMUS SERPL-MCNC: 8 NG/ML

## 2019-07-01 NOTE — ASSESSMENT
[Treatment Education] : treatment education [Treatment Adherence] : treatment adherence [Rx Dose / Side Effects] : Rx dose/side effects [Risk Reduction] : risk reduction [Nutritional / Food Issues] : nutritional/food issues [Drug Interactions / Side Effects] : drug interactions/side effects [Disclosure of Diagnosis] : disclosure of diagnosis [Anticipatory Guidance] : anticipatory guidance [FreeTextEntry1] : 68 yo man with a history of an OHTx in 2/ 18 with an initially complicated course with  development of Nocardia pneumonia treated with oral Bactrim for the past year DS bid and with Veltaessa to help control his hyperkalemia. His most recent chest CT scan shows resolution of the RUL pulmonary nodule. He is afebrile, no cough, feeling well able to perform activities of daily living. ambulates with assistance of a cane.\par \par Will decrease the Bactrim DS to one pill a day with the goal of tapering to off in the next month\par Will need repeat immunization for Hepb Series to start today\par Revaccinate with Prevnar followed by pneumovax\par Will need flu vaccine in the fall\par HAV immune\par Shingrix\par \par

## 2019-07-01 NOTE — HISTORY OF PRESENT ILLNESS
[Women] : with women [FreeTextEntry1] : 70 yo man with a history NICM with CHF Underwent HM2 LVAD in 6/17 with course complicatedby GI bleeding. Patient underwent OHTx in 2/23/18 from an HCV+ donor. The post op course was complicated by graft dysfunction requiring plasmapharesis, IVIG and Rituximab. Also with NORMAN on top of CKD.  In 6/18 he was noted to have pneumonia on CTscan and was proven to be Nocardia\par Patient has been treated with Bactrim DS bid for the past year and a few months. Most recent CT of the chest shows resolution of the nodule\par \par He returns to clinic today feeling well without new complaints [Sexually Active] : The patient is not sexually active [de-identified] : retired [de-identified] : alone

## 2019-07-01 NOTE — PHYSICAL EXAM
[General Appearance - Alert] : alert [General Appearance - In No Acute Distress] : in no acute distress [PERRL With Normal Accommodation] : pupils were equal in size, round, reactive to light [Sclera] : the sclera and conjunctiva were normal [Outer Ear] : the ears and nose were normal in appearance [Oropharynx] : the oropharynx was normal with no thrush [Extraocular Movements] : extraocular movements were intact [Neck Cervical Mass (___cm)] : no neck mass was observed [Jugular Venous Distention Increased] : there was no jugular-venous distention [Neck Appearance] : the appearance of the neck was normal [Auscultation Breath Sounds / Voice Sounds] : lungs were clear to auscultation bilaterally [Thyroid Diffuse Enlargement] : the thyroid was not enlarged [Heart Rate And Rhythm] : heart rate was normal and rhythm regular [Full Pulse] : the pedal pulses are present [Edema] : there was no peripheral edema [Bowel Sounds] : normal bowel sounds [Abdomen Soft] : soft [Abdomen Tenderness] : non-tender [Abdomen Mass (___ Cm)] : no abdominal mass palpated [Costovertebral Angle Tenderness] : no CVA tenderness [Musculoskeletal - Swelling] : no joint swelling [No Palpable Adenopathy] : no palpable adenopathy [Motor Tone] : muscle strength and tone were normal [Nail Clubbing] : no clubbing  or cyanosis of the fingernails [Skin Color & Pigmentation] : normal skin color and pigmentation [] : no rash [Sensation] : the sensory exam was normal to light touch and pinprick [Deep Tendon Reflexes (DTR)] : deep tendon reflexes were 2+ and symmetric [Affect] : the affect was normal [No Focal Deficits] : no focal deficits [Oriented To Time, Place, And Person] : oriented to person, place, and time [FreeTextEntry1] : murmur audible and mitral valve area syst and diastolic

## 2019-07-02 NOTE — INPATIENT CERTIFICATION FOR MEDICARE PATIENTS - PHYSICIAN CONCUR
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  - Neurology following  - s/p trach (5/21) and s/p previous rounds of IVIG and plasmapharesis  - returning and admitted for f/u plasmapharesis   - plan for plasmapheresis tomorrow. Dr. 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  - Neurology following  - s/p trach (5/21) and s/p previous rounds of IVIG and plasmapharesis  - returning and admitted for f/u plasmapharesis   - plan for plasmapheresis today? Dr. 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  - Neurology following  - s/p trach (5/21) and s/p previous rounds of IVIG and plasmapharesis  - returning and admitted for f/u plasmapharesis   - plan for plasmapheresis today. Dr. García made aware

## 2019-07-03 ENCOUNTER — APPOINTMENT (OUTPATIENT)
Dept: INFECTIOUS DISEASE | Facility: CLINIC | Age: 69
End: 2019-07-03

## 2019-07-08 ENCOUNTER — OTHER (OUTPATIENT)
Age: 69
End: 2019-07-08

## 2019-07-08 RX ORDER — SULFAMETHOXAZOLE AND TRIMETHOPRIM 800; 160 MG/1; MG/1
800-160 TABLET ORAL
Qty: 30 | Refills: 0 | Status: DISCONTINUED | COMMUNITY
Start: 2018-10-29 | End: 2019-07-08

## 2019-07-10 ENCOUNTER — FORM ENCOUNTER (OUTPATIENT)
Age: 69
End: 2019-07-10

## 2019-07-11 ENCOUNTER — OUTPATIENT (OUTPATIENT)
Dept: OUTPATIENT SERVICES | Facility: HOSPITAL | Age: 69
LOS: 1 days | End: 2019-07-11
Payer: MEDICARE

## 2019-07-11 ENCOUNTER — APPOINTMENT (OUTPATIENT)
Dept: MRI IMAGING | Facility: IMAGING CENTER | Age: 69
End: 2019-07-11
Payer: MEDICARE

## 2019-07-11 DIAGNOSIS — Z98.890 OTHER SPECIFIED POSTPROCEDURAL STATES: Chronic | ICD-10-CM

## 2019-07-11 DIAGNOSIS — R97.20 ELEVATED PROSTATE SPECIFIC ANTIGEN [PSA]: ICD-10-CM

## 2019-07-11 DIAGNOSIS — Z94.1 HEART TRANSPLANT STATUS: Chronic | ICD-10-CM

## 2019-07-11 PROCEDURE — 72197 MRI PELVIS W/O & W/DYE: CPT | Mod: 26

## 2019-07-11 PROCEDURE — 72197 MRI PELVIS W/O & W/DYE: CPT

## 2019-07-11 PROCEDURE — A9585: CPT

## 2019-07-12 ENCOUNTER — OTHER (OUTPATIENT)
Age: 69
End: 2019-07-12

## 2019-07-14 PROBLEM — A04.72 CLOSTRIDIUM DIFFICILE DIARRHEA: Status: RESOLVED | Noted: 2018-09-03 | Resolved: 2019-07-14

## 2019-07-15 ENCOUNTER — APPOINTMENT (OUTPATIENT)
Dept: UROLOGY | Facility: CLINIC | Age: 69
End: 2019-07-15
Payer: MEDICARE

## 2019-07-15 DIAGNOSIS — R97.20 ELEVATED PROSTATE, SPECIFIC ANTIGEN [PSA]: ICD-10-CM

## 2019-07-15 PROCEDURE — 99213 OFFICE O/P EST LOW 20 MIN: CPT

## 2019-07-15 NOTE — ASSESSMENT
[FreeTextEntry1] : Nocturia x3 but denies any difficulty voiding. Denies intermittency.\par Repeat PSA on 06/17/19 was 4.35, increased from 4.08 on 04/22/19.\par MRI prostate 07/11/19 reported volume of 18 cc with diffusion weighted sequences that are noncontributory, therefore PIRADS could not be assigned. T2 hypointense lesion within the left peripheral zone demonstrating enhancement and remains suspicious although indeterminate.\par Advised pt to repeat PSA when he sees his cardiologist Dr Alonzo Villasenor in 4 months.

## 2019-07-15 NOTE — HISTORY OF PRESENT ILLNESS
[FreeTextEntry1] : 69 year old man with past medical history of a nonischemic cardiomyopathy and chronic systolic heart failure s/p HM2 LVAD in June 2017 complicated by recurrent GI hemorrhage and possible pump thrombosis who underwent heart transplant on 2/23/18 with a hepatitis C positive donor.\par S/p hip replacement.\par Recently seen by Dr. MARISSA Stahl, his cardiologist, who noted that his PSA was 4.08 in Feb 2019.\par Nocturia x3 but denies any difficulty voiding. Denies intermittency.\par Repeat PSA on 06/17/19 was 4.35, increased from 4.08 on 04/22/19.\par MRI prostate 07/11/19 reported volume of 18 cc with diffusion weighted sequences that are noncontributory, therefore PIRADS could not be assigned. T2 hypointense lesion within the left peripheral zone demonstrating enhancement and remains suspicious although indeterminate.\par Advised pt to repeat PSA when he sees his cardiologist Dr Alonzo Villasenor in 4 months.

## 2019-07-16 ENCOUNTER — LABORATORY RESULT (OUTPATIENT)
Age: 69
End: 2019-07-16

## 2019-07-17 LAB
ALBUMIN SERPL ELPH-MCNC: 4.5 G/DL
ALP BLD-CCNC: 95 U/L
ALT SERPL-CCNC: 23 U/L
ANION GAP SERPL CALC-SCNC: 15 MMOL/L
AST SERPL-CCNC: 25 U/L
BASOPHILS # BLD AUTO: 0 K/UL
BASOPHILS NFR BLD AUTO: 0 %
BILIRUB SERPL-MCNC: 0.7 MG/DL
BUN SERPL-MCNC: 20 MG/DL
CALCIUM SERPL-MCNC: 9.1 MG/DL
CHLORIDE SERPL-SCNC: 103 MMOL/L
CO2 SERPL-SCNC: 22 MMOL/L
CREAT SERPL-MCNC: 1.26 MG/DL
EOSINOPHIL # BLD AUTO: 0.1 K/UL
EOSINOPHIL NFR BLD AUTO: 2 %
GLUCOSE SERPL-MCNC: 88 MG/DL
HCT VFR BLD CALC: 38.1 %
HGB BLD-MCNC: 12.6 G/DL
LYMPHOCYTES # BLD AUTO: 1.8 K/UL
LYMPHOCYTES NFR BLD AUTO: 33 %
MAGNESIUM SERPL-MCNC: 1.7 MG/DL
MAN DIFF?: YES
MCHC RBC-ENTMCNC: 31.5 PG
MCHC RBC-ENTMCNC: 33 GM/DL
MCV RBC AUTO: 95.3 FL
MONOCYTES # BLD AUTO: 0.8 K/UL
MONOCYTES NFR BLD AUTO: 10 %
NEUTROPHILS # BLD AUTO: 3.7 K/UL
NEUTROPHILS NFR BLD AUTO: 52 %
PLATELET # BLD AUTO: 234 K/UL
POTASSIUM SERPL-SCNC: 5.1 MMOL/L
PROT SERPL-MCNC: 7 G/DL
RBC # BLD: 4 M/UL
RBC # FLD: 14.5 %
SODIUM SERPL-SCNC: 140 MMOL/L
TACROLIMUS SERPL-MCNC: 8.4 NG/ML
WBC # FLD AUTO: 6.4 K/UL

## 2019-07-24 ENCOUNTER — OTHER (OUTPATIENT)
Age: 69
End: 2019-07-24

## 2019-07-25 NOTE — PROGRESS NOTE ADULT - PROBLEM SELECTOR PLAN 1
- Awaiting culture data from CT-guided biopsy by IR done 6/18.  - Appreciate input from ID  - Per Dr. Lujan, will discontinue cefepime after today and continue the voriconazole only. - Awaiting culture data from CT-guided biopsy by IR done 6/18.  - Appreciate input from ID  - Per Dr. Lujan, cefepime ended yesterday and continue the voriconazole only. He will need repeat CT scan in 1 month. medication patch(es) used

## 2019-08-05 ENCOUNTER — MED ADMIN CHARGE (OUTPATIENT)
Age: 69
End: 2019-08-05

## 2019-08-07 LAB
ALBUMIN SERPL ELPH-MCNC: 4.2 G/DL
ALP BLD-CCNC: 94 U/L
ALT SERPL-CCNC: 16 U/L
ANION GAP SERPL CALC-SCNC: 18 MMOL/L
AST SERPL-CCNC: 24 U/L
BASOPHILS # BLD AUTO: 0.03 K/UL
BASOPHILS NFR BLD AUTO: 0.4 %
BILIRUB SERPL-MCNC: 1 MG/DL
BUN SERPL-MCNC: 19 MG/DL
CALCIUM SERPL-MCNC: 9.2 MG/DL
CHLORIDE SERPL-SCNC: 105 MMOL/L
CO2 SERPL-SCNC: 18 MMOL/L
CREAT SERPL-MCNC: 1.16 MG/DL
EOSINOPHIL # BLD AUTO: 0.12 K/UL
EOSINOPHIL NFR BLD AUTO: 1.7 %
GLUCOSE SERPL-MCNC: 87 MG/DL
HCT VFR BLD CALC: 39.3 %
HGB BLD-MCNC: 12.1 G/DL
IMM GRANULOCYTES NFR BLD AUTO: 0.4 %
LYMPHOCYTES # BLD AUTO: 1.85 K/UL
LYMPHOCYTES NFR BLD AUTO: 26.2 %
MAGNESIUM SERPL-MCNC: 1.5 MG/DL
MAN DIFF?: NORMAL
MCHC RBC-ENTMCNC: 30 PG
MCHC RBC-ENTMCNC: 30.8 GM/DL
MCV RBC AUTO: 97.5 FL
MONOCYTES # BLD AUTO: 0.93 K/UL
MONOCYTES NFR BLD AUTO: 13.2 %
NEUTROPHILS # BLD AUTO: 4.11 K/UL
NEUTROPHILS NFR BLD AUTO: 58.1 %
PLATELET # BLD AUTO: 239 K/UL
POTASSIUM SERPL-SCNC: 4.6 MMOL/L
PROT SERPL-MCNC: 6.9 G/DL
RBC # BLD: 4.03 M/UL
RBC # FLD: 14.6 %
SODIUM SERPL-SCNC: 141 MMOL/L
TACROLIMUS SERPL-MCNC: 9.9 NG/ML
WBC # FLD AUTO: 7.07 K/UL

## 2019-08-09 ENCOUNTER — RESULT REVIEW (OUTPATIENT)
Age: 69
End: 2019-08-09

## 2019-08-09 RX ORDER — PATIROMER 25.2 G/1
25.2 POWDER, FOR SUSPENSION ORAL
Qty: 30 | Refills: 5 | Status: DISCONTINUED | COMMUNITY
Start: 2018-11-27 | End: 2019-08-09

## 2019-08-21 ENCOUNTER — RESULT REVIEW (OUTPATIENT)
Age: 69
End: 2019-08-21

## 2019-08-21 ENCOUNTER — OUTPATIENT (OUTPATIENT)
Dept: OUTPATIENT SERVICES | Facility: HOSPITAL | Age: 69
LOS: 1 days | End: 2019-08-21
Payer: MEDICARE

## 2019-08-21 ENCOUNTER — APPOINTMENT (OUTPATIENT)
Dept: CV DIAGNOSITCS | Facility: HOSPITAL | Age: 69
End: 2019-08-21

## 2019-08-21 ENCOUNTER — APPOINTMENT (OUTPATIENT)
Dept: CARDIOLOGY | Facility: CLINIC | Age: 69
End: 2019-08-21
Payer: MEDICARE

## 2019-08-21 VITALS
HEART RATE: 101 BPM | SYSTOLIC BLOOD PRESSURE: 151 MMHG | DIASTOLIC BLOOD PRESSURE: 94 MMHG | HEIGHT: 68 IN | WEIGHT: 164.91 LBS | TEMPERATURE: 98 F | OXYGEN SATURATION: 98 % | RESPIRATION RATE: 16 BRPM

## 2019-08-21 VITALS
RESPIRATION RATE: 18 BRPM | OXYGEN SATURATION: 98 % | WEIGHT: 165 LBS | BODY MASS INDEX: 25.01 KG/M2 | HEIGHT: 68 IN | SYSTOLIC BLOOD PRESSURE: 151 MMHG | HEART RATE: 101 BPM | DIASTOLIC BLOOD PRESSURE: 94 MMHG | TEMPERATURE: 98 F

## 2019-08-21 DIAGNOSIS — Z94.1 HEART TRANSPLANT STATUS: Chronic | ICD-10-CM

## 2019-08-21 DIAGNOSIS — T86.21 HEART TRANSPLANT REJECTION: ICD-10-CM

## 2019-08-21 DIAGNOSIS — Z98.890 OTHER SPECIFIED POSTPROCEDURAL STATES: Chronic | ICD-10-CM

## 2019-08-21 DIAGNOSIS — Z94.1 HEART TRANSPLANT STATUS: ICD-10-CM

## 2019-08-21 LAB
ALBUMIN SERPL ELPH-MCNC: 4 G/DL — SIGNIFICANT CHANGE UP (ref 3.3–5)
ALP SERPL-CCNC: 94 U/L — SIGNIFICANT CHANGE UP (ref 40–120)
ALT FLD-CCNC: 12 U/L — SIGNIFICANT CHANGE UP (ref 10–45)
ANION GAP SERPL CALC-SCNC: 13 MMOL/L — SIGNIFICANT CHANGE UP (ref 5–17)
AST SERPL-CCNC: 19 U/L — SIGNIFICANT CHANGE UP (ref 10–40)
BASOPHILS # BLD AUTO: 0 K/UL — SIGNIFICANT CHANGE UP (ref 0–0.2)
BASOPHILS NFR BLD AUTO: 0.2 % — SIGNIFICANT CHANGE UP (ref 0–2)
BILIRUB SERPL-MCNC: 0.9 MG/DL — SIGNIFICANT CHANGE UP (ref 0.2–1.2)
BUN SERPL-MCNC: 19 MG/DL — SIGNIFICANT CHANGE UP (ref 7–23)
CALCIUM SERPL-MCNC: 9.5 MG/DL — SIGNIFICANT CHANGE UP (ref 8.4–10.5)
CHLORIDE SERPL-SCNC: 103 MMOL/L — SIGNIFICANT CHANGE UP (ref 96–108)
CO2 SERPL-SCNC: 20 MMOL/L — LOW (ref 22–31)
CREAT SERPL-MCNC: 1.19 MG/DL — SIGNIFICANT CHANGE UP (ref 0.5–1.3)
EOSINOPHIL # BLD AUTO: 0.1 K/UL — SIGNIFICANT CHANGE UP (ref 0–0.5)
EOSINOPHIL NFR BLD AUTO: 1.5 % — SIGNIFICANT CHANGE UP (ref 0–6)
GLUCOSE SERPL-MCNC: 86 MG/DL — SIGNIFICANT CHANGE UP (ref 70–99)
HCT VFR BLD CALC: 38.6 % — LOW (ref 39–50)
HGB BLD-MCNC: 12.2 G/DL — LOW (ref 13–17)
LYMPHOCYTES # BLD AUTO: 1.7 K/UL — SIGNIFICANT CHANGE UP (ref 1–3.3)
LYMPHOCYTES # BLD AUTO: 25 % — SIGNIFICANT CHANGE UP (ref 13–44)
MAGNESIUM SERPL-MCNC: 1.6 MG/DL — SIGNIFICANT CHANGE UP (ref 1.6–2.6)
MCHC RBC-ENTMCNC: 30.3 PG — SIGNIFICANT CHANGE UP (ref 27–34)
MCHC RBC-ENTMCNC: 31.6 GM/DL — LOW (ref 32–36)
MCV RBC AUTO: 96 FL — SIGNIFICANT CHANGE UP (ref 80–100)
MONOCYTES # BLD AUTO: 0.8 K/UL — SIGNIFICANT CHANGE UP (ref 0–0.9)
MONOCYTES NFR BLD AUTO: 12.5 % — SIGNIFICANT CHANGE UP (ref 2–14)
NEUTROPHILS # BLD AUTO: 4.1 K/UL — SIGNIFICANT CHANGE UP (ref 1.8–7.4)
NEUTROPHILS NFR BLD AUTO: 60.8 % — SIGNIFICANT CHANGE UP (ref 43–77)
PLATELET # BLD AUTO: 247 K/UL — SIGNIFICANT CHANGE UP (ref 150–400)
POTASSIUM SERPL-MCNC: 4.1 MMOL/L — SIGNIFICANT CHANGE UP (ref 3.5–5.3)
POTASSIUM SERPL-SCNC: 4.1 MMOL/L — SIGNIFICANT CHANGE UP (ref 3.5–5.3)
PROT SERPL-MCNC: 7.4 G/DL — SIGNIFICANT CHANGE UP (ref 6–8.3)
RBC # BLD: 4.02 M/UL — LOW (ref 4.2–5.8)
RBC # FLD: 14.2 % — SIGNIFICANT CHANGE UP (ref 10.3–14.5)
SODIUM SERPL-SCNC: 136 MMOL/L — SIGNIFICANT CHANGE UP (ref 135–145)
TACROLIMUS SERPL-MCNC: 9.6 NG/ML — SIGNIFICANT CHANGE UP
WBC # BLD: 6.7 K/UL — SIGNIFICANT CHANGE UP (ref 3.8–10.5)
WBC # FLD AUTO: 6.7 K/UL — SIGNIFICANT CHANGE UP (ref 3.8–10.5)

## 2019-08-21 PROCEDURE — 93505 ENDOMYOCARDIAL BIOPSY: CPT | Mod: 26,GC

## 2019-08-21 PROCEDURE — C1817: CPT

## 2019-08-21 PROCEDURE — 88307 TISSUE EXAM BY PATHOLOGIST: CPT

## 2019-08-21 PROCEDURE — 99153 MOD SED SAME PHYS/QHP EA: CPT

## 2019-08-21 PROCEDURE — C1884: CPT

## 2019-08-21 PROCEDURE — 83735 ASSAY OF MAGNESIUM: CPT

## 2019-08-21 PROCEDURE — 93005 ELECTROCARDIOGRAM TRACING: CPT

## 2019-08-21 PROCEDURE — 93505 ENDOMYOCARDIAL BIOPSY: CPT

## 2019-08-21 PROCEDURE — 80053 COMPREHEN METABOLIC PANEL: CPT

## 2019-08-21 PROCEDURE — 88346 IMFLUOR 1ST 1ANTB STAIN PX: CPT

## 2019-08-21 PROCEDURE — 88342 IMHCHEM/IMCYTCHM 1ST ANTB: CPT | Mod: 26

## 2019-08-21 PROCEDURE — 88342 IMHCHEM/IMCYTCHM 1ST ANTB: CPT

## 2019-08-21 PROCEDURE — 85027 COMPLETE CBC AUTOMATED: CPT

## 2019-08-21 PROCEDURE — 88307 TISSUE EXAM BY PATHOLOGIST: CPT | Mod: 26

## 2019-08-21 PROCEDURE — 88346 IMFLUOR 1ST 1ANTB STAIN PX: CPT | Mod: 26

## 2019-08-21 PROCEDURE — 80197 ASSAY OF TACROLIMUS: CPT

## 2019-08-21 PROCEDURE — 99152 MOD SED SAME PHYS/QHP 5/>YRS: CPT | Mod: GC

## 2019-08-21 PROCEDURE — C1894: CPT

## 2019-08-21 PROCEDURE — 99152 MOD SED SAME PHYS/QHP 5/>YRS: CPT

## 2019-08-21 PROCEDURE — 99215 OFFICE O/P EST HI 40 MIN: CPT | Mod: NC

## 2019-08-21 PROCEDURE — C1769: CPT

## 2019-08-21 PROCEDURE — 93010 ELECTROCARDIOGRAM REPORT: CPT

## 2019-08-21 PROCEDURE — C1889: CPT

## 2019-08-21 RX ORDER — PATIROMER 8.4 G/1
1 POWDER, FOR SUSPENSION ORAL
Qty: 0 | Refills: 0 | DISCHARGE

## 2019-08-21 NOTE — H&P CARDIOLOGY - HISTORY OF PRESENT ILLNESS
69 year old man with PMHx of a nonischemic cardiomyopathy and chronic systolic heart failure s/p HM2 LVAD in June 2017 complicated by recurrent GI hemorrhage and possible pump thrombosis who underwent heart transplant on 2/23/18 (CMV D-/R+ and Toxo D-/R+, Hep C+ heart). His course was complicated by bilateral IJ/subclavian thrombi, a persistent small right sided pleural effusion, as well as acute on chronic renal failure of unclear etiology. In addition, his post-operative course was complicated by graft dysfunction of unclear etiology and persistent C4d positive staining on endomyocardial biopsy with negative DSAs. He developed joshua evidence of graft dysfunction leading to treatment with plasmapheresis, IVIG, and rituximab.     In June 2018, he was admitted with a left sided infiltrate, fevers, and chills. CT guided biopsy was unremarkable. He was treated empirically with antibiotics and discharged on voriconazole for presumed fungal pneumonia. He was readmitted on 8/14 through 8/29 and again in September with recurrent signs and symptoms of infection. CT scan showed a new right upper lobe infiltrate and he was ultimately diagnosed with Nocardia pneumonia and has been on treatment with Imipenem. He was also found to have evidence of C. diff and was treated with oral vancomycin. C diff toxin positive c/b colitis on last RHC w/biopsy noted to be febrile and swelling s/p hospitalization for PNA and JP continues on oral ABX and Vanco taper with Rt arm PICC line removed 11/7/18    -Discharged from [facility] and was transitioned from IV antibiotics to oral Bactrim for ongoing treatment of Nocardia. He was simultaneously started on Veltessa given prior episodes of hyperkalemia. He notes that he can walk without shortness of breath, but is primarily limited by ongoing chronic right knee pain. He was previously scheduled for RHC, however deferred due to hyperkalemia. He was admitted and treated- He received Kayexalate with an improvement in potassium level.     8/21 Patient presents today for RHC with biopsy, he denies any CP/dyspnea/palpitations/orthopnea/PND/edema,fever/chills, n/v, abdominal pain.

## 2019-08-21 NOTE — H&P CARDIOLOGY - PSH
AICD (Automatic Cardioverter/Defibrillator) Present  [employer] [place] with 1 [employer] lead4/1/09- explanted and replaced with RateItAlltronic 2 leads on 9/2/09  H/O heart transplant  2/2018  History of Prior Ablation Treatment  for afib  S/P right heart catheterization  biopsy multiple  Status post left hip replacement

## 2019-08-22 ENCOUNTER — RX RENEWAL (OUTPATIENT)
Age: 69
End: 2019-08-22

## 2019-08-23 ENCOUNTER — OTHER (OUTPATIENT)
Age: 69
End: 2019-08-23

## 2019-08-30 ENCOUNTER — OTHER (OUTPATIENT)
Age: 69
End: 2019-08-30

## 2019-09-03 ENCOUNTER — OTHER (OUTPATIENT)
Age: 69
End: 2019-09-03

## 2019-09-04 NOTE — CHART NOTE - NSCHARTNOTEFT_GEN_A_CORE
----- Message from Shwetha Torres sent at 9/4/2019  2:01 PM CDT -----  Contact: Zeny grandmother  Type:  Sooner Apoointment Request    Caller is requesting a sooner appointment.  Caller declined first available appointment listed below.  Caller will not accept being placed on the waitlist and is requesting a message be sent to doctor.    Name of Caller:  Zeny  When is the first available appointment?  N/a NP  Symptoms:  Ulcers all in the pt's mouth/off and on fever/  Best Call Back Number:  [phone]  Additional Information:  There are remarks to not schedule w/ Dr Chin and a no show for Jeremie. Pls call Grandmother to adv if a NP appt can be scheduled for pt. I didn't want to schedule w/ the remarks until office notified.      Source: Patient [ x ]    Family [ ]     other [ x ] Comprehensive chart review     Pt seen for nutrition follow up, sitting in chair at bedside. Reports much improved appetite and eating 100% of meals and drinking both Ensure Enlive daily. Pt states he dislikes health shakes, and requested to discontinue. Denies nausea/vomiting, but reports some diarrhea.   Pt requested to defer therapeutic diet review/reinforcement at this time, stating "I know the diets." Also states he is going to make all necessary changes to his diet.     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     Diet : regular, low sodium, Ensure Enlive 2 times/day.    Admission Weight: 78.9kg  Current Weight: 75.8kg  Weight fluctuations noted, likely due to fluid shifts. Pt with 1+bilateral hand edema.     Pertinent Medications: MEDICATIONS  (STANDING):  ascorbic acid 500 milliGRAM(s) Oral two times a day  docusate sodium 100 milliGRAM(s) Oral three times a day  hepatitis B Vaccine - Adult (ENGERIX) 1 milliLiter(s) IntraMuscular once  hydrALAZINE 25 milliGRAM(s) Oral every 8 hours  hydrocortisone 2.5% Cream 1 Application(s) Topical two times a day  iron sucrose IVPB 100 milliGRAM(s) IV Intermittent <User Schedule>  mexiletine 150 milliGRAM(s) Oral two times a day  pantoprazole  Injectable 40 milliGRAM(s) IV Push daily  polyethylene glycol 3350 17 Gram(s) Oral daily  QUEtiapine 50 milliGRAM(s) Oral at bedtime  senna 2 Tablet(s) Oral at bedtime  sodium chloride 0.9% lock flush 3 milliLiter(s) IV Push every 8 hours    MEDICATIONS  (PRN):  acetaminophen   Tablet 650 milliGRAM(s) Oral every 6 hours PRN For Temp over 38.3 C (100.94 F)    Pertinent Labs:    Complete Blood Count (02.16.18 @ 05:58)    Hemoglobin: 7.1 g/dL - low    Hematocrit: 22.0 % - low  Basic Metabolic Panel - STAT (02.16.18 @ 05:58)    Sodium, Serum: 140 mmol/L    Potassium, Serum: 3.8 mmol/L    Chloride, Serum: 105 mmol/L    Carbon Dioxide, Serum: 24 mmol/L    Anion Gap, Serum: 11 mmol/L    Blood Urea Nitrogen, Serum: 19 mg/dL    Creatinine, Serum: 1.22 mg/dL    Glucose, Serum: 90 mg/dL    Calcium, Total Serum: 8.3 mg/dL - low  Lactate Dehydrogenase, Serum (02.16.18 @ 05:58)    Lactate Dehydrogenase, Serum: 392 U/L - high  Phosphorus Level, Serum (02.14.18 @ 22:32)    Phosphorus Level, Serum: 2.4 mg/dL - low        Skin: No pressure ulcers noted.    Estimated Needs:   [ x ] no change since previous assessment  [ ] recalculated:       Previous Nutrition Diagnosis:   Limited adherence to nutrition related recommendations improving.  Inadequate oral intake improved.        New Nutrition Diagnosis: [ x ] not applicable         Interventions:   1. Encourage PO intake with all meals and supplements.  2. Provide food preferences within therapeutic diet when requested.   3. Reviewed alternate menu options and menu ordering procedure.   4. Will follow up with further diet instruction as accepted by pt.  5. Pt made aware RD remains available.        Monitoring and Evaluation:     [ x ] PO intake [ x ] Tolerance to diet prescription [ x ] weights [ x ] follow up per protocol    [ ] other:

## 2019-09-10 LAB
ALBUMIN SERPL ELPH-MCNC: 4 G/DL
ALP BLD-CCNC: 98 U/L
ALT SERPL-CCNC: 11 U/L
ANION GAP SERPL CALC-SCNC: 12 MMOL/L
AST SERPL-CCNC: 18 U/L
BASOPHILS # BLD AUTO: 0.03 K/UL
BASOPHILS NFR BLD AUTO: 0.4 %
BILIRUB SERPL-MCNC: 0.9 MG/DL
BUN SERPL-MCNC: 21 MG/DL
CALCIUM SERPL-MCNC: 9.2 MG/DL
CHLORIDE SERPL-SCNC: 105 MMOL/L
CO2 SERPL-SCNC: 24 MMOL/L
CREAT SERPL-MCNC: 1.17 MG/DL
EOSINOPHIL # BLD AUTO: 0.16 K/UL
EOSINOPHIL NFR BLD AUTO: 2.2 %
GLUCOSE SERPL-MCNC: 95 MG/DL
HCT VFR BLD CALC: 38.1 %
HGB BLD-MCNC: 12 G/DL
IMM GRANULOCYTES NFR BLD AUTO: 0.4 %
LYMPHOCYTES # BLD AUTO: 1.59 K/UL
LYMPHOCYTES NFR BLD AUTO: 22.2 %
MAGNESIUM SERPL-MCNC: 1.7 MG/DL
MAN DIFF?: NORMAL
MCHC RBC-ENTMCNC: 29.7 PG
MCHC RBC-ENTMCNC: 31.5 GM/DL
MCV RBC AUTO: 94.3 FL
MONOCYTES # BLD AUTO: 0.77 K/UL
MONOCYTES NFR BLD AUTO: 10.8 %
NEUTROPHILS # BLD AUTO: 4.57 K/UL
NEUTROPHILS NFR BLD AUTO: 64 %
PLATELET # BLD AUTO: 244 K/UL
POTASSIUM SERPL-SCNC: 4.5 MMOL/L
PROT SERPL-MCNC: 7.1 G/DL
RBC # BLD: 4.04 M/UL
RBC # FLD: 14.7 %
SODIUM SERPL-SCNC: 141 MMOL/L
TACROLIMUS SERPL-MCNC: 10.7 NG/ML
WBC # FLD AUTO: 7.15 K/UL

## 2019-09-19 PROBLEM — T86.21: Status: RESOLVED | Noted: 2018-05-09 | Resolved: 2019-09-19

## 2019-09-19 NOTE — HISTORY OF PRESENT ILLNESS
[FreeTextEntry1] : Mr. Baez is a 69 year old man with past medical history of a nonischemic cardiomyopathy and chronic systolic heart failure s/p HM2 LVAD in June 2017 complicated by recurrent GI hemorrhage and possible pump thrombosis who underwent heart transplant on 2/23/18 with a hepatitis C positive donor. His course was complicated by bilateral IJ/subclavian thrombi, a persistent small right sided pleural effusion, as well as acute on chronic renal failure of unclear etiology. In addition, his post-operative course was complicated by graft dysfunction of unclear etiology and persistent C4d positive staining on endomyocardial biopsy with negative DSAs. He developed joshua evidence of graft dysfunction leading to treatment with plasmapheresis, IVIG, and rituximab. Subsequently in June of 2018 he was found to have an pneumonia, that was ultimately diagnosed as Nocardia\par \par He presents today for routine RHC with biopsy. He currently feels well. He has been off of Bactrim. He notes that he can walk without shortness of breath and can walk about 4-5 blocks almost everyday. He is limited by chronic right knee pain, which has not changed. He has no PND or orthopnea and minimal dry cough. He denies fevers or chills. He has no abdominal pain, nausea, vomiting,diarrhea, increase in abdominal girth or swelling in the legs. He has rashes. \par \par He had a right heart catheterization today.  RA 4 RV 26/4 PCW 9 PA 29/13/20 PA sat 70.6%  Ao sat 98%.\par CO/CI 6.4/3.4. Biopsy was done. Specimen sent to pathology.\par \par PSA was evaluated in April and remains elevated in 6/17. He has been evaluated by Dr. Coles from urology and he has recommended repeat PSA bhavesh 3 months. MRI of prostate from 7/11/19 was reviewed by him. \par

## 2019-09-19 NOTE — DISCUSSION/SUMMARY
[FreeTextEntry1] : - Immunosuppression: We will continue him on current dose of Prograf and Cellcept. Goal dose Prograf  level 8-10.  Today's level pending. \par \par - S/P Heart transplantation: He has no evidence of clinically significant graft dysfunction or congestive heart failure on exam. 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 He has no significant CAV. 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 He continues on fludrocortisone and sodium bicarbonate. Valtessa has been discontinued. \par \par - Antibiotic prophylaxis:CMV (intermediate risk): None currently required. \par \par -Nocardia pneumonia: Bactrim was discontinued. Appreciated Dr. Lujan's note. Recommended routine vaccinations including hep B series, Prevnar followed by pneumovax, and flu vaccine in the fall. \par \par - Osteoporosis prophylaxis:Osteopenia noted on yearly Dexa scan 5/22/19. Endocrine referral given. Continue calcium + vit D 2 tabs BID.\par \par - Hypomagnesemia: Continue magnesium oxide 1200 mg daily.\par \par - Elevated PSA: Urology is following his PSA level. Rechecking the level in a few months.\par \par - Chronic right knee pain, osteoarthritis: He was evaluated by orthopedic surgeon. He was not a candidate for Total arthroplasty. We'll contact Dr. Vines regarding if this high risk is due to his cardiac transplant status or due to inherent risk from the procedure and other comorbidities. He was given custom braces and  intra-articular cortisone injection.

## 2019-09-19 NOTE — PHYSICAL EXAM
[General Appearance - Well Developed] : well developed [General Appearance - Well Nourished] : well nourished [Normal Conjunctiva] : the conjunctiva exhibited no abnormalities [Normal Oral Mucosa] : normal oral mucosa [Normal Oropharynx] : normal oropharynx [Auscultation Breath Sounds / Voice Sounds] : lungs were clear to auscultation bilaterally [Respiration, Rhythm And Depth] : normal respiratory rhythm and effort [Heart Rate And Rhythm] : heart rate and rhythm were normal [Heart Sounds] : normal S1 and S2 [Arterial Pulses Normal] : the arterial pulses were normal [Edema] : no peripheral edema present [Bowel Sounds] : normal bowel sounds [Abdomen Soft] : soft [Abdomen Tenderness] : non-tender [Abnormal Walk] : normal gait [Nail Clubbing] : no clubbing of the fingernails [Cyanosis, Localized] : no localized cyanosis [Skin Turgor] : normal skin turgor [Skin Color & Pigmentation] : normal skin color and pigmentation [] : no rash [Oriented To Time, Place, And Person] : oriented to person, place, and time [Impaired Insight] : insight and judgment were intact [No Anxiety] : not feeling anxious [FreeTextEntry1] : Regular, but tachycardic. soft continuous murmur

## 2019-09-19 NOTE — ASSESSMENT
[FreeTextEntry1] : Mr. Baez is a 69 year old man with history of NICM, s/p heart transplantation on 2/23/18. 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 Early post transplant he received therapy with IVIG, plasmapheresis, and rituximab with course complicated by development of Nocardia pulmonary infection, for which he completed a course of Bactrim. He is feeling well today.

## 2019-09-27 ENCOUNTER — APPOINTMENT (OUTPATIENT)
Dept: ENDOCRINOLOGY | Facility: CLINIC | Age: 69
End: 2019-09-27
Payer: MEDICARE

## 2019-09-27 VITALS
BODY MASS INDEX: 25.01 KG/M2 | HEART RATE: 115 BPM | WEIGHT: 165 LBS | HEIGHT: 68 IN | SYSTOLIC BLOOD PRESSURE: 110 MMHG | DIASTOLIC BLOOD PRESSURE: 70 MMHG | OXYGEN SATURATION: 96 %

## 2019-09-27 PROCEDURE — 99214 OFFICE O/P EST MOD 30 MIN: CPT

## 2019-09-27 PROCEDURE — 99204 OFFICE O/P NEW MOD 45 MIN: CPT

## 2019-09-27 NOTE — HISTORY OF PRESENT ILLNESS
[Calcium (supplements)] : calcium from a dietary supplement [Vitamin D (supplements)] : Vitamin D as a dietary supplement [Low Calcium Intake] : low calcium intake [Uses a Walker/Cane] : use of a walker/cane [Regular Dental Follow-Up] : regular dental follow-up [FreeTextEntry1] : Patient is a 70 yo man with hx of nonischemic cardiomyopathy and heart failure s/p LVAD and heart transplant here for evaluation of osteopenia\par \par The patient received a heart transplant in 2018.  The patient had left hip surgery years ago from arthritis.  Denies any fracture history. He fell when he was in the hospital for transplant but does not fall frequently. He is not on steroids.  \par Patient does not drink milk, rarely eats cheese, no yogurt, no fish.  Mostly eats chicken, beef.  Gets meals on wheels\par Patient takes calcium and vitamin D\par He lives alone, brother is close by.  Wife  from lung cancer, son  few years ago\par Last dental visit: long time ago, he needs dentures\par Bone density May 2019 with femoral neck T score of -2.1, total hip T score of -1.2 and spine T score of -1.1 [Low Vit D Intake/Exposure] : no past or present history of low vitamin D intake [Premat. Menopause (natural)] : no past or present history of premature natural menopause [Premat. Menopause (surg)] : no past or present history of premature surgical menopause [Taking Steroids] : no past or present history of taking steroids [Kidney Stones] : no history of kidney stones [Excessive Alcohol Intake] : no past or present history of excessive alcohol intake [Excessive Soda] : no past or present history of excessive soda intake [Current Smoking___(ppd)] : not currently smoking [High Fall Risk] : no fall risk [Frequent Falls] : no frequent falls [Family History of Osteoporosis] : no family history of osteoporosis [Family History of Breast Cancer] : no family history of breast cancer [Family History of Hip Fracture] : no family history of hip fracture [Hyperparathyroidism] : no hyperparathyroidism [History of Blood Clots] : no history of blood clots [History of Radiation Therapy] : no history of radiation therapy [Previous Fragility Fracture] : no previous fragility fracture

## 2019-09-27 NOTE — PHYSICAL EXAM
[Alert] : alert [No Acute Distress] : no acute distress [Well Nourished] : well nourished [Well Developed] : well developed [EOMI] : extra ocular movement intact [No Proptosis] : no proptosis [Normal Hearing] : hearing was normal [No Respiratory Distress] : no respiratory distress [Normal Rate and Effort] : normal respiratory rhythm and effort [No Accessory Muscle Use] : no accessory muscle use [Clear to Auscultation] : lungs were clear to auscultation bilaterally [Normal Rate] : heart rate was normal  [Normal S1, S2] : normal S1 and S2 [Regular Rhythm] : with a regular rhythm [Normal Bowel Sounds] : normal bowel sounds [Not Tender] : non-tender [Soft] : abdomen soft [Normal Affect] : the affect was normal [Normal Mood] : the mood was normal [de-identified] : ambulates with a cane [de-identified] : gout right finger #3

## 2019-09-27 NOTE — REVIEW OF SYSTEMS
[Negative] : Eyes [All other systems negative] : All other systems negative [Decreased Appetite] : appetite not decreased [Fatigue] : no fatigue [Dysphonia] : no dysphonia [Dysphagia] : no dysphagia [Chest Pain] : no chest pain [Palpitations] : no palpitations [Heart Rate Is Fast] : the heart rate was not fast [Heart Rate Is Slow] : the heart rate was not slow [Nausea] : no nausea [Vomiting] : no vomiting was observed [Constipation] : no constipation [Diarrhea] : no diarrhea [Cold Intolerance] : cold tolerant [Heat Intolerance] : heat tolerant

## 2019-09-27 NOTE — CONSULT LETTER
[Dear  ___] : Dear ~ESPERANZA, [Courtesy Letter:] : I had the pleasure of seeing your patient, [unfilled], in my office today. [Please see my note below.] : Please see my note below. [Consult Closing:] : Thank you very much for allowing me to participate in the care of this patient.  If you have any questions, please do not hesitate to contact me. [Sincerely,] : Sincerely, [FreeTextEntry3] : Na Trevizo MD

## 2019-09-27 NOTE — ASSESSMENT
[FreeTextEntry1] : Patient is a 70 yo man with heart transplant February 2018 here for evaluation of osteopenia\par \par 1. Osteoepenia\par -DXA post transplant shows femoral neck T score of -2.1\par -he has no clinical fracture history\par Solid organ transplantation is associated with increase fracture risk in patients with osteoporosis. This risk can be as high as 65% and appropriate treatment is recommended. SHANICE Sánchez (Osteo Int 2003).  If patients are on high dose steroids, this can increase risk factors associated with osteoporosis.  However, the reality is that no clinical factors reliably predict post-transplant bone loss and fracture.  In patients with osteopenia, calcium and vitamin D levels should be replete (Osteo Int 2009). However, Mr Baez does not have osteoporosis by DXA scoring and FRAX hip fracture is -0.2%.  Options to start bisphosphonate was discussed in the context of his high fall risk. For now, endocrine work up first.  If labs are normal, I would anticipate starting the patient on Boniva once a month. Risk of ONJ/AFF discussed, he has dentures with no plans for major dental work\par  [Bisphosphonate Therapy] : Risks  and benefits of bisphosphonate therapy were  discussed with the patient including gastroesophageal irritation, osteonecrosis of the jaw, and atypical femur fractures, and acute phase reaction

## 2019-10-01 ENCOUNTER — RX RENEWAL (OUTPATIENT)
Age: 69
End: 2019-10-01

## 2019-10-01 LAB
25(OH)D3 SERPL-MCNC: 43.4 NG/ML
BASOPHILS # BLD AUTO: 0.02 K/UL
BASOPHILS NFR BLD AUTO: 0.3 %
CALCIUM SERPL-MCNC: 9.6 MG/DL
EOSINOPHIL # BLD AUTO: 0.15 K/UL
EOSINOPHIL NFR BLD AUTO: 1.9 %
ESTIMATED AVERAGE GLUCOSE: 108 MG/DL
FERRITIN SERPL-MCNC: 106 NG/ML
HBA1C MFR BLD HPLC: 5.4 %
HCT VFR BLD CALC: 38.4 %
HGB BLD-MCNC: 11.8 G/DL
IMM GRANULOCYTES NFR BLD AUTO: 0.4 %
LYMPHOCYTES # BLD AUTO: 1.47 K/UL
LYMPHOCYTES NFR BLD AUTO: 18.5 %
MAN DIFF?: NORMAL
MCHC RBC-ENTMCNC: 28.9 PG
MCHC RBC-ENTMCNC: 30.7 GM/DL
MCV RBC AUTO: 93.9 FL
MONOCYTES # BLD AUTO: 0.67 K/UL
MONOCYTES NFR BLD AUTO: 8.4 %
NEUTROPHILS # BLD AUTO: 5.59 K/UL
NEUTROPHILS NFR BLD AUTO: 70.5 %
PARATHYROID HORMONE INTACT: 33 PG/ML
PLATELET # BLD AUTO: 305 K/UL
PROLACTIN SERPL-MCNC: 4.8 NG/ML
RBC # BLD: 4.09 M/UL
RBC # FLD: 14.9 %
T4 FREE SERPL-MCNC: 1.2 NG/DL
TRANSFERRIN SERPL-MCNC: 263 MG/DL
TSH SERPL-ACNC: 1.78 UIU/ML
WBC # FLD AUTO: 7.93 K/UL

## 2019-10-07 ENCOUNTER — APPOINTMENT (OUTPATIENT)
Dept: INFECTIOUS DISEASE | Facility: CLINIC | Age: 69
End: 2019-10-07
Payer: MEDICARE

## 2019-10-07 VITALS
SYSTOLIC BLOOD PRESSURE: 152 MMHG | HEART RATE: 123 BPM | HEIGHT: 68 IN | BODY MASS INDEX: 25.61 KG/M2 | TEMPERATURE: 96.8 F | WEIGHT: 169 LBS | OXYGEN SATURATION: 96 % | DIASTOLIC BLOOD PRESSURE: 101 MMHG

## 2019-10-07 VITALS — DIASTOLIC BLOOD PRESSURE: 89 MMHG | SYSTOLIC BLOOD PRESSURE: 134 MMHG

## 2019-10-07 PROCEDURE — 90732 PPSV23 VACC 2 YRS+ SUBQ/IM: CPT

## 2019-10-07 PROCEDURE — G0008: CPT

## 2019-10-07 PROCEDURE — 99214 OFFICE O/P EST MOD 30 MIN: CPT | Mod: 25

## 2019-10-07 PROCEDURE — G0009: CPT

## 2019-10-07 PROCEDURE — 90686 IIV4 VACC NO PRSV 0.5 ML IM: CPT

## 2019-10-09 NOTE — PHYSICAL EXAM
[General Appearance - In No Acute Distress] : in no acute distress [General Appearance - Alert] : alert [Sclera] : the sclera and conjunctiva were normal [PERRL With Normal Accommodation] : pupils were equal in size, round, reactive to light [Outer Ear] : the ears and nose were normal in appearance [Extraocular Movements] : extraocular movements were intact [Oropharynx] : the oropharynx was normal with no thrush [Neck Appearance] : the appearance of the neck was normal [Neck Cervical Mass (___cm)] : no neck mass was observed [Jugular Venous Distention Increased] : there was no jugular-venous distention [Thyroid Diffuse Enlargement] : the thyroid was not enlarged [Auscultation Breath Sounds / Voice Sounds] : lungs were clear to auscultation bilaterally [Heart Rate And Rhythm] : heart rate was normal and rhythm regular [Full Pulse] : the pedal pulses are present [Edema] : there was no peripheral edema [Bowel Sounds] : normal bowel sounds [Abdomen Soft] : soft [Abdomen Tenderness] : non-tender [Abdomen Mass (___ Cm)] : no abdominal mass palpated [Costovertebral Angle Tenderness] : no CVA tenderness [No Palpable Adenopathy] : no palpable adenopathy [Musculoskeletal - Swelling] : no joint swelling [Nail Clubbing] : no clubbing  or cyanosis of the fingernails [Motor Tone] : muscle strength and tone were normal [Skin Color & Pigmentation] : normal skin color and pigmentation [] : no rash [Deep Tendon Reflexes (DTR)] : deep tendon reflexes were 2+ and symmetric [Sensation] : the sensory exam was normal to light touch and pinprick [No Focal Deficits] : no focal deficits [Oriented To Time, Place, And Person] : oriented to person, place, and time [Affect] : the affect was normal [FreeTextEntry1] : murmur audible and mitral valve area syst and diastolic

## 2019-10-09 NOTE — ASSESSMENT
[Treatment Education] : treatment education [Treatment Adherence] : treatment adherence [Rx Dose / Side Effects] : Rx dose/side effects [Risk Reduction] : risk reduction [Nutritional / Food Issues] : nutritional/food issues [Universal Precautions] : universal precautions [Medical Care Issues] : medical care issues [Drug Interactions / Side Effects] : drug interactions/side effects [Disclosure of Diagnosis] : disclosure of diagnosis [Sexuality / Safer Sex] : sexuality/safer sex [Anticipatory Guidance] : anticipatory guidance [FreeTextEntry1] : 70 yo man with a history of an OHTx in 2/ 18 with an initially complicated course but doing well at this point. No recent infections.  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

## 2019-10-09 NOTE — HISTORY OF PRESENT ILLNESS
[Women] : with women [FreeTextEntry1] : 70 yo man with a history NICM with CHF Underwent HM2 LVAD in 6/17 with course complicatedby GI bleeding. Patient underwent OHTx in 2/23/18 from an HCV+ donor. The post op course was complicated by graft dysfunction requiring plasmapharesis, IVIG and Rituximab. Also with NORMAN on top of CKD.  In 6/18 he was noted to have pneumonia on CTscan and was proven to be Nocardia\par Patient has been treated with Bactrim DS bid for the past year and a few months. Most recent CT of the chest shows resolution of the nodule\par \par He returns to clinic today feeling well without new complaints [Sexually Active] : The patient is not sexually active [de-identified] : alone [de-identified] : retired

## 2019-10-14 ENCOUNTER — APPOINTMENT (OUTPATIENT)
Dept: INFECTIOUS DISEASE | Facility: CLINIC | Age: 69
End: 2019-10-14
Payer: MEDICARE

## 2019-10-14 PROCEDURE — 90750 HZV VACC RECOMBINANT IM: CPT | Mod: NC,GY

## 2019-10-14 PROCEDURE — 90471 IMMUNIZATION ADMIN: CPT | Mod: GY

## 2019-11-07 NOTE — PROGRESS NOTE ADULT - ATTENDING COMMENTS
Gerardo Watts.    I, Dr. Gerardo Watts, personally performed the services described in this documentation, as scribed by Jennifer Ayala, in my presence, and it is both accurate and complete.         Did well overnight and looking forward to going home. No complaints.  He did take his Mayvret last night and today.  He was able to describe his insulin regimen to me and has been giving himself injections while here.    OK for discharge home. No need for further Lovenox or nystatin S/S.  Continue tacrolimus 8 mg po Q12h, but will follow levels closely now that he is back on the Mayvret.  Will need outpatient Endocrinology follow-up.

## 2019-11-11 NOTE — ED ADULT TRIAGE NOTE - BP NONINVASIVE SYSTOLIC (MM HG)
[facility]                               Cardiology      PCP: Ariel Jorge MD    Chief Complaint   Patient presents with   • Follow-up     routine follow up visit.        HPI   Jorge Del Angel is a 69 year old White male here today.  The patient is here for follow-up today.  He denies chest pain, shortness of breath, palpitations, heart racing, dizziness, or syncope.  He denies leg edema, orthopnea, paroxysmal nocturnal dyspnea.  As part of an evaluation for a high risk calcium screening he underwent a stress echo on June 14 of this year which was normal.  He denies falls or bleeding complications since the last office visit    PAST MEDICAL HX:    Type 2 DM with CKD stage 3 and hypertension (C* 5/6/2019      Paroxysmal atrial fibrillation (CMS/HCC)        5/6/2019      Male hypogonadism                               5/6/2019      Osteoarthritis of both knees                    5/6/2019      Hypothyroidism                                  5/6/2019      Mixed hyperlipidemia                            5/6/2019      Iron (Fe) deficiency anemia                     5/6/2019      Hypertension, well controlled                   5/6/2019      Chronic kidney disease (CKD), stage III (moder* 9/7/2019      Umbilical hernia                                9/7/2019      Diverticulitis-recurrent                        9/7/2019      Benign hypertension with CKD (chronic kidney d* 9/7/2019      Trigger finger of both hands                    9/9/2019        Comment: No significant symptoms at present.  Refer to                Ortho if symptoms progress.    Cataracts, bilateral                            9/9/2019      Agatston coronary artery calcium score greater* 01/30/2019    PAST SURGICAL HX:    TONSILLECTOMY AND ADENOIDECTOMY                               APPENDECTOMY                                                  KNEE ARTHROSCOPY W/ DEBRIDEMENT                                TOTAL KNEE ARTHROPLASTY                                       OLECRANON BURSECTOMY                                          Family History   Problem Relation Age of Onset   • Coronary Artery Disease Father    • Dementia/Alzheimers Mother         no known CAD     Review of patient's family status indicates:    Father                                       82    Mother                                       84      Social History     Socioeconomic History   • Marital status: /Civil Union     Spouse name: Not on file   • Number of children: 3   • Years of education: Not on file   • Highest education level: Not on file   Occupational History   • Occupation: Retired salesman   Social Needs   • Financial resource strain: Not on file   • Food insecurity:     Worry: Not on file     Inability: Not on file   • Transportation needs:     Medical: Not on file     Non-medical: Not on file   Tobacco Use   • Smoking status: Never Smoker   • Smokeless tobacco: Never Used   Substance and Sexual Activity   • Alcohol use: Yes     Alcohol/week: 3.0 standard drinks     Types: 3 Glasses of wine per week   • Drug use: Never   • Sexual activity: Not on file   Lifestyle   • Physical activity:     Days per week: Not on file     Minutes per session: Not on file   • Stress: Not on file   Relationships   • Social connections:     Talks on phone: Not on file     Gets together: Not on file     Attends Sikhism service: Not on file     Active member of club or organization: Not on file     Attends meetings of clubs or organizations: Not on file     Relationship status: Not on file   • Intimate partner violence:     Fear of current or ex partner: Not on file     Emotionally abused: Not on file     Physically abused: Not on file     Forced sexual activity: Not on file   Other Topics Concern   • Not on file   Social History Narrative   • Not on file        Allergies  ALLERGIES:  No Known Allergies     Current  Medications  Current Outpatient Medications   Medication Sig Dispense Refill   • atorvastatin (LIPITOR) 40 MG tablet TAKE 1 TABLET BY MOUTH DAILY 90 tablet 0   • lisinopril (ZESTRIL) 20 MG tablet TAKE 1 TABLET BY MOUTH EVERY DAY 90 tablet 3   • hydrochlorothiazide (HYDRODIURIL) 25 MG tablet TAKE 1 TABLET BY MOUTH EVERY MORNING 90 tablet 3   • PRECISION XTRA GLUCOSE test strip TEST THREE TIMES DAILY AS DIRECTED 300 strip 0   • montelukast (SINGULAIR) 10 MG tablet TAKE 1 TABLET BY MOUTH DAILY FOR CONGESTION 90 tablet 0   • levothyroxine (SYNTHROID, LEVOTHROID) 50 MCG tablet TAKE 1 TABLET BY MOUTH DAILY ON AN EMPTY STOMACH 90 tablet 0   • rivaroxaban (XARELTO) 20 MG Tab Take 1 tablet by mouth daily. 90 tablet 0   • metformin (GLUCOPHAGE) 1000 MG tablet TAKE 1 TABLET BY MOUTH TWICE DAILY WITH BREAKFAST AND DINNER 180 tablet 1   • Magnesium Oxide 400 (240 Mg) MG Tab      • Cyanocobalamin (VITAMIN B 12 PO)      • Omega-3 Fatty Acids (FISH OIL PO)      • metoPROLOL succinate (TOPROL-XL) 25 MG 24 hr tablet Take 1 tablet by mouth daily. 90 tablet 0     No current facility-administered medications for this visit.         Review of Systems  Review of Systems   HENT: Negative for nosebleeds.    Cardiovascular: Negative for chest pain, claudication, dyspnea on exertion, irregular heartbeat, leg swelling, near-syncope, orthopnea, palpitations, paroxysmal nocturnal dyspnea and syncope.   Hematologic/Lymphatic: Negative for bleeding problem. Does not bruise/bleed easily.   Gastrointestinal: Negative for hematochezia and melena.   Genitourinary: Negative for hematuria.          Visit Vitals  /64 (BP Location: RUE - Right upper extremity, Patient Position: Sitting)   Pulse 64   Ht 6' (1.829 m)   Wt 86.2 kg (190 lb)   BMI 25.77 kg/m²     Vital signs were reviewed today.    Physical Exam   Constitutional: He is oriented to person, place, and time. He appears well-developed and well-nourished. He is cooperative.   HENT:   Head:  Normocephalic and atraumatic.   Eyes: Pupils are equal, round, and reactive to light. Conjunctivae and EOM are normal. No scleral icterus.   Neck: Normal carotid pulses, no hepatojugular reflux and no JVD present. Carotid bruit is not present.   Cardiovascular: Normal rate, regular rhythm, S1 normal, S2 normal and intact distal pulses. PMI is not displaced. Exam reveals no gallop.   No murmur heard.  Pulses:       Carotid pulses are 2+ on the right side and 2+ on the left side.       Dorsalis pedis pulses are 2+ on the right side and 2+ on the left side.   Pulmonary/Chest: Effort normal. No respiratory distress. He has no wheezes. He has no rales. He exhibits no tenderness.   Abdominal: Soft. He exhibits no distension and no mass. There is no hepatosplenomegaly. There is no tenderness. There is no rebound and no guarding. Musculoskeletal:         General: No edema.     Neurological: He is alert and oriented to person, place, and time. No cranial nerve deficit.   Skin: Skin is warm and dry. No rash noted. He is not diaphoretic.   Psychiatric: He has a normal mood and affect. His behavior is normal.           Recent Labs:   CHOL/HDL (no units)   Date Value   2019 2.8     HDL (mg/dL)   Date Value   2019 33 (L)     No components found for: LDL  Recent Labs   Lab 19  1118 19  1650   WBC 6.8 7.6   RBC 4.72 4.59   HGB 14.1 13.6   HCT 44.2 40.2   MCV 93.6 87.6   MCHC 31.9* 33.8   RDW-CV 14.5 14.2    248   LYMPH 24 19     Recent Labs   Lab 19  1118   ALT/SGPT 32        Diagnostic Testin2019 10:59 PM - Ernst, [facility] Incoming Lab From [facility]     Component Value Ref Range & Units Status Collected Lab   FASTING STATUS UNKNOWN  hrs Final 2019 11:18 AM ACL [place]   CHOLESTEROL 92  <200 mg/dL Final 2019 11:18 AM ACL [place]   Comment:       Desirable            <200   Borderline High      200 to 239   High                 >=240        CALCULATED LDL 29  <130 mg/dL Final 2019 11:18 AM  [facility] [place]   Comment:   OPTIMAL               <100   NEAR OPTIMAL          100-129   BORDERLINE HIGH       130-159   HIGH                  160-189   VERY HIGH             >=190    HDL 33Low   >39 mg/dL Final 09/09/2019 11:18 AM ACL [place]   Comment:   Low            <40   Borderline Low 40 to 49   Near Optimal   50 to 59   Optimal        >=60    TRIGLYCERIDE 151High   <150 mg/dL Final 09/09/2019 11:18 AM ACL [place]   Comment:   Normal                   <150   Borderline High          150 to 199   High                     200 to 499   Very High                >=500    CALCULATED NON HDL 59  mg/dL Final 09/09/2019 11:18 AM ACL [place]   Comment:       Therapeutic Target:   CHD and risk equivalents <130   Multiple risk factors    <160   0 to 1 risk factors      <190        CHOL/HDL 2.8  <4.5 Final 09/09/2019 11:18 AM        Assessment :  Patient Active Problem List   Diagnosis   • Hypothyroidism   • Type 2 DM with CKD stage 3 and hypertension (CMS/HCC)   • Mixed hyperlipidemia   • Male hypogonadism   • Paroxysmal atrial fibrillation (CMS/HCC)   • Iron (Fe) deficiency anemia   • High risk medications (not anticoagulants) long-term use   • Osteoarthritis of left knee   • Microalbuminuria   • Chronic kidney disease (CKD), stage III (moderate) (CMS/HCC)   • Umbilical hernia   • Diverticulitis-recurrent   • Encounter for preventive care   • Screening for colon cancer   • Benign hypertension with CKD (chronic kidney disease) stage III (CMS/HCC)   • Trigger finger of both hands   • Cataracts, bilateral   • Agatston coronary artery calcium score greater than 400        Plan:  Problem List Items Addressed This Visit        Circulatory    Paroxysmal atrial fibrillation (CMS/HCC) - Primary     The patient is in sinus rhythm today.  CHADs-VASc score is at least 4, therefore chronic anticoagulation remains indicated.  I will continue to monitor for falls and bleeding complications.         Agatston coronary artery calcium score greater than 400      The patient is doing well at this time however given the high risk calcium screen as well as personal history of diabetes I recommend yearly stress testing with imaging modality to exclude silent myocardial ischemia.         Type 2 DM with CKD stage 3 and hypertension (CMS/[facility])    Benign hypertension with CKD (chronic kidney disease) stage III (CMS/HCC)     Renal condition is Stable and blood pressure is acceptable.  Continue current treatment regimen.  Renal condition will be reassessed in 6 months.            Endocrine    Mixed hyperlipidemia     Lipid abnormalities are Managed with high intensity statins.  Lipids are at goal.  Continue present management.  Lipids will be reassessed   By PCP.            Greater than 50% of the visit was spent counseling regarding above issues; total time spent 25 minutes.   84

## 2019-11-13 ENCOUNTER — OTHER (OUTPATIENT)
Age: 69
End: 2019-11-13

## 2019-11-15 ENCOUNTER — OTHER (OUTPATIENT)
Age: 69
End: 2019-11-15

## 2019-11-15 ENCOUNTER — RESULT REVIEW (OUTPATIENT)
Age: 69
End: 2019-11-15

## 2019-11-15 LAB
ALBUMIN SERPL ELPH-MCNC: 4 G/DL
ALP BLD-CCNC: 101 U/L
ALT SERPL-CCNC: 13 U/L
ANION GAP SERPL CALC-SCNC: 12 MMOL/L
AST SERPL-CCNC: 21 U/L
BASOPHILS # BLD AUTO: 0.03 K/UL
BASOPHILS NFR BLD AUTO: 0.5 %
BILIRUB SERPL-MCNC: 0.9 MG/DL
BUN SERPL-MCNC: 24 MG/DL
CALCIUM SERPL-MCNC: 9 MG/DL
CHLORIDE SERPL-SCNC: 104 MMOL/L
CHOLEST SERPL-MCNC: 100 MG/DL
CHOLEST/HDLC SERPL: 2.9 RATIO
CO2 SERPL-SCNC: 23 MMOL/L
CREAT SERPL-MCNC: 1.24 MG/DL
EOSINOPHIL # BLD AUTO: 0.11 K/UL
EOSINOPHIL NFR BLD AUTO: 1.9 %
GLUCOSE SERPL-MCNC: 94 MG/DL
HCT VFR BLD CALC: 38.2 %
HDLC SERPL-MCNC: 35 MG/DL
HGB BLD-MCNC: 11.9 G/DL
IMM GRANULOCYTES NFR BLD AUTO: 0.3 %
LDLC SERPL CALC-MCNC: 45 MG/DL
LYMPHOCYTES # BLD AUTO: 1.44 K/UL
LYMPHOCYTES NFR BLD AUTO: 24.8 %
MAGNESIUM SERPL-MCNC: 1.8 MG/DL
MAN DIFF?: NORMAL
MCHC RBC-ENTMCNC: 29 PG
MCHC RBC-ENTMCNC: 31.2 GM/DL
MCV RBC AUTO: 92.9 FL
MONOCYTES # BLD AUTO: 0.84 K/UL
MONOCYTES NFR BLD AUTO: 14.5 %
NEUTROPHILS # BLD AUTO: 3.36 K/UL
NEUTROPHILS NFR BLD AUTO: 58 %
PLATELET # BLD AUTO: 211 K/UL
POTASSIUM SERPL-SCNC: 4.4 MMOL/L
PROT SERPL-MCNC: 7.4 G/DL
PSA SERPL-MCNC: 3.82 NG/ML
RBC # BLD: 4.11 M/UL
RBC # FLD: 16.4 %
SODIUM SERPL-SCNC: 139 MMOL/L
TACROLIMUS SERPL-MCNC: 8.4 NG/ML
TRIGL SERPL-MCNC: 101 MG/DL
WBC # FLD AUTO: 5.8 K/UL

## 2019-11-18 LAB
CMV DNA SPEC QL NAA+PROBE: ABNORMAL IU/ML
CMVPCR LOG: ABNORMAL LOGIU/ML

## 2019-11-20 ENCOUNTER — RESULT REVIEW (OUTPATIENT)
Age: 69
End: 2019-11-20

## 2019-11-20 ENCOUNTER — APPOINTMENT (OUTPATIENT)
Dept: CV DIAGNOSITCS | Facility: HOSPITAL | Age: 69
End: 2019-11-20

## 2019-11-20 ENCOUNTER — APPOINTMENT (OUTPATIENT)
Dept: CARDIOLOGY | Facility: CLINIC | Age: 69
End: 2019-11-20
Payer: MEDICARE

## 2019-11-20 ENCOUNTER — OUTPATIENT (OUTPATIENT)
Dept: OUTPATIENT SERVICES | Facility: HOSPITAL | Age: 69
LOS: 1 days | End: 2019-11-20
Payer: MEDICARE

## 2019-11-20 VITALS
DIASTOLIC BLOOD PRESSURE: 94 MMHG | WEIGHT: 166.01 LBS | OXYGEN SATURATION: 97 % | TEMPERATURE: 98 F | RESPIRATION RATE: 16 BRPM | HEART RATE: 109 BPM | SYSTOLIC BLOOD PRESSURE: 139 MMHG | HEIGHT: 68 IN

## 2019-11-20 VITALS
OXYGEN SATURATION: 97 % | HEART RATE: 109 BPM | TEMPERATURE: 97.7 F | HEIGHT: 71 IN | DIASTOLIC BLOOD PRESSURE: 94 MMHG | WEIGHT: 166 LBS | BODY MASS INDEX: 23.24 KG/M2 | RESPIRATION RATE: 18 BRPM | SYSTOLIC BLOOD PRESSURE: 139 MMHG

## 2019-11-20 DIAGNOSIS — Z86.19 PERSONAL HISTORY OF OTHER INFECTIOUS AND PARASITIC DISEASES: ICD-10-CM

## 2019-11-20 DIAGNOSIS — Z92.25 PERSONAL HISTORY OF IMMUNOSUPRESSION THERAPY: ICD-10-CM

## 2019-11-20 DIAGNOSIS — Z98.890 OTHER SPECIFIED POSTPROCEDURAL STATES: Chronic | ICD-10-CM

## 2019-11-20 DIAGNOSIS — Z94.1 HEART TRANSPLANT STATUS: ICD-10-CM

## 2019-11-20 DIAGNOSIS — N18.3 CHRONIC KIDNEY DISEASE, STAGE 3 (MODERATE): ICD-10-CM

## 2019-11-20 DIAGNOSIS — Z94.1 HEART TRANSPLANT STATUS: Chronic | ICD-10-CM

## 2019-11-20 PROCEDURE — 88307 TISSUE EXAM BY PATHOLOGIST: CPT | Mod: 26

## 2019-11-20 PROCEDURE — 93321 DOPPLER ECHO F-UP/LMTD STD: CPT | Mod: 26,59

## 2019-11-20 PROCEDURE — 88342 IMHCHEM/IMCYTCHM 1ST ANTB: CPT | Mod: 26

## 2019-11-20 PROCEDURE — 93505 ENDOMYOCARDIAL BIOPSY: CPT | Mod: 26

## 2019-11-20 PROCEDURE — 93010 ELECTROCARDIOGRAM REPORT: CPT

## 2019-11-20 PROCEDURE — 93308 TTE F-UP OR LMTD: CPT | Mod: 26,59

## 2019-11-20 PROCEDURE — 99215 OFFICE O/P EST HI 40 MIN: CPT

## 2019-11-20 PROCEDURE — 88307 TISSUE EXAM BY PATHOLOGIST: CPT

## 2019-11-20 PROCEDURE — 99152 MOD SED SAME PHYS/QHP 5/>YRS: CPT

## 2019-11-20 PROCEDURE — 88346 IMFLUOR 1ST 1ANTB STAIN PX: CPT

## 2019-11-20 PROCEDURE — C1889: CPT

## 2019-11-20 PROCEDURE — 93321 DOPPLER ECHO F-UP/LMTD STD: CPT

## 2019-11-20 PROCEDURE — 93505 ENDOMYOCARDIAL BIOPSY: CPT

## 2019-11-20 PROCEDURE — 88346 IMFLUOR 1ST 1ANTB STAIN PX: CPT | Mod: 26

## 2019-11-20 PROCEDURE — C1894: CPT

## 2019-11-20 PROCEDURE — C1884: CPT

## 2019-11-20 PROCEDURE — 93306 TTE W/DOPPLER COMPLETE: CPT | Mod: 26

## 2019-11-20 PROCEDURE — 88342 IMHCHEM/IMCYTCHM 1ST ANTB: CPT

## 2019-11-20 PROCEDURE — 93306 TTE W/DOPPLER COMPLETE: CPT

## 2019-11-20 PROCEDURE — 99153 MOD SED SAME PHYS/QHP EA: CPT

## 2019-11-20 PROCEDURE — 93308 TTE F-UP OR LMTD: CPT

## 2019-11-20 PROCEDURE — 93005 ELECTROCARDIOGRAM TRACING: CPT

## 2019-11-20 RX ORDER — SODIUM POLYSTYRENE SULFONATE 15 G/60ML
15 SUSPENSION ORAL; RECTAL
Qty: 1 | Refills: 1 | Status: DISCONTINUED | COMMUNITY
Start: 2018-04-12 | End: 2019-11-20

## 2019-11-20 RX ORDER — SODIUM POLYSTYRENE SULFONATE 4.1 MEQ/G
0 POWDER, FOR SUSPENSION ORAL
Qty: 0 | Refills: 0 | DISCHARGE

## 2019-11-20 RX ORDER — FLUDROCORTISONE ACETATE 0.1 MG/1
0.1 TABLET ORAL DAILY
Qty: 28 | Refills: 4 | Status: DISCONTINUED | COMMUNITY
Start: 2018-11-30 | End: 2019-11-20

## 2019-11-20 NOTE — H&P CARDIOLOGY - PSH
AICD (Automatic Cardioverter/Defibrillator) Present  [employer] [place] with 1 [employer] [place] lead4/1/09- explanted and replaced with Flashpointtronic 2 leads on 9/2/09  H/O heart transplant  2/2018  History of Prior Ablation Treatment  for afib  S/P right heart catheterization  biopsy multiple  Status post left hip replacement

## 2019-11-20 NOTE — REASON FOR VISIT
[Heart Transplant] : heart transplant [Follow-Up - Clinic] : a clinic follow-up of [FreeTextEntry1] : immunosuppressed status

## 2019-11-20 NOTE — H&P CARDIOLOGY - HISTORY OF PRESENT ILLNESS
69 year old AA man with PMHx of a nonischemic cardiomyopathy and chronic systolic heart failure s/p HM2 LVAD in June 2017 complicated by recurrent GI hemorrhage and possible pump thrombosis who underwent heart transplant on 2/23/18 (CMV D-/R+ and Toxo D-/R+, Hep C+ heart). His course was complicated by bilateral IJ/subclavian thrombi, a persistent small right sided pleural effusion, as well as acute on chronic renal failure of unclear etiology. In addition, his post-operative course was complicated by graft dysfunction of unclear etiology and persistent C4d positive staining on endomyocardial biopsy with negative DSAs. He developed joshua evidence of graft dysfunction leading to treatment with plasmapheresis, IVIG, and rituximab.     In June 2018, he was admitted with a left sided infiltrate, fevers, and chills. CT guided biopsy was unremarkable. He was treated empirically with antibiotics and discharged on voriconazole for presumed fungal pneumonia. He was readmitted on 8/14 through 8/29 and again in September with recurrent signs and symptoms of infection. CT scan showed a new right upper lobe infiltrate and he was ultimately diagnosed with Nocardia pneumonia and has been on treatment with Imipenem. He was also found to have evidence of C. diff and was treated with oral vancomycin. C diff toxin positive c/b colitis on last RHC w/biopsy noted to be febrile and swelling s/p hospitalization for PNA and JP continues on oral ABX and Vanco taper with Rt arm PICC line removed 11/7/18    -Discharged from [facility] rehab and was transitioned from IV antibiotics to oral Bactrim for ongoing treatment of Nocardia. He was simultaneously started on Veltessa given prior episodes of hyperkalemia. He notes that he can walk without shortness of breath, but is primarily limited by ongoing chronic right knee pain. He was previously scheduled for RHC, however deferred due to hyperkalemia. He was admitted and treated- He received Kayexalate with an improvement in potassium level.     8/21/2019 s/p RHC with biopsy, he denies any CP/dyspnea/palpitations/orthopnea/PND/edema,fever/chills, n/v, abdominal pain.   11/20/2019  presents for RHC with biopsy.  Echo intra op and full TTE ordered.  labs WNL (done on 11/15/19) 69 year old AA man with PMHx of a nonischemic cardiomyopathy and chronic systolic heart failure s/p HM2 LVAD in June 2017 complicated by recurrent GI hemorrhage and possible pump thrombosis who underwent heart transplant on 2/23/18 (CMV D-/R+ and Toxo D-/R+, Hep C+ heart). His course was complicated by bilateral IJ/subclavian thrombi, a persistent small right sided pleural effusion, as well as acute on chronic renal failure of unclear etiology. In addition, his post-operative course was complicated by graft dysfunction of unclear etiology and persistent C4d positive staining on endomyocardial biopsy with negative DSAs. He developed joshua evidence of graft dysfunction leading to treatment with plasmapheresis, IVIG, and rituximab.     In June 2018, he was admitted with a left sided infiltrate, fevers, and chills. CT guided biopsy was unremarkable. He was treated empirically with antibiotics and discharged on voriconazole for presumed fungal pneumonia. He was readmitted on 8/14 through 8/29 and again in September with recurrent signs and symptoms of infection. CT scan showed a new right upper lobe infiltrate and he was ultimately diagnosed with Nocardia pneumonia and has been on treatment with Imipenem. He was also found to have evidence of C. diff and was treated with oral vancomycin. C diff toxin positive c/b colitis on last RHC w/biopsy noted to be febrile and swelling s/p hospitalization for PNA and JP continues on oral ABX and Vanco taper with Rt arm PICC line removed 11/7/18    -Discharged from [facility] rehab and was transitioned from IV antibiotics to oral Bactrim for ongoing treatment of Nocardia. He was simultaneously started on Veltessa given prior episodes of hyperkalemia. He notes that he can walk without shortness of breath, but is primarily limited by ongoing chronic right knee pain. He was previously scheduled for RHC, however deferred due to hyperkalemia. He was admitted and treated- He received Kayexalate with an improvement in potassium level.     8/21/2019 s/p RHC with biopsy, he denies any CP/dyspnea/palpitations/orthopnea/PND/edema,fever/chills, n/v, abdominal pain.     11/20/2019  presents for RHC with biopsy.  Denies any discomfort.  Reports taking meds as ordered.  Echo intra op and full TTE ordered.  labs WNL (done on 11/15/19)

## 2019-11-20 NOTE — DISCUSSION/SUMMARY
[FreeTextEntry1] : - Immunosuppression: We will continue him on current dose of Prograf. Goal dose Prograf  level 8-10. He remains off of prednisone and is on CellCept 500 mg BID. \par - S/P Heart transplantation: He has no evidence of clinically significant graft dysfunction or congestive heart failure on exam. 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 He has no significant CAV. We will order an echocardiogram for routine surveillance. \par - History of stage III CKD with RTA: This appears to have resolved. Given that he is of of Bactrim, we will discontinue the fludrocortisone, which may assist in improving his blood pressure. \par - Antibiotic prophylaxis:CMV (intermediate risk): None currently required. \par - CAV prophylaxis/hyperlipidemia: Continue ASA 81mg daily and pravachol 20 mg daily.\par - Ulcer prophylaxis: Continue famotidine 20mg daily.\par - Osteoporosis prophylaxis: Continue calcium + vit D 2 tabs BID.\par - Hypomagnesemia: Continue magnesium oxide 1200 mg daily.\par - Elevated PSA: Stable.\par - Chronic right knee pain, osteoarthritis: Knee brace. Continue to monitor.

## 2019-11-20 NOTE — ASSESSMENT
[FreeTextEntry1] : Mr. Baez is a 69 year old man with history of NICM, s/p heart transplantation on 2/23/18. 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 Early post transplant he received therapy with IVIG, plasmapheresis, and rituximab with course complicated by development of Nocardia pulmonary infection, which is resolved. He remains hypertensive.

## 2019-11-20 NOTE — HISTORY OF PRESENT ILLNESS
[FreeTextEntry1] : Mr. Baez is a 69 year old man with past medical history of a nonischemic cardiomyopathy and chronic systolic heart failure s/p HM2 LVAD in June 2017 complicated by recurrent GI hemorrhage and possible pump thrombosis who underwent heart transplant on 2/23/18 with a hepatitis C positive donor. His course was complicated by bilateral IJ/subclavian thrombi, a persistent small right sided pleural effusion, as well as acute on chronic renal failure of unclear etiology. In addition, his post-operative course was complicated by graft dysfunction of unclear etiology and persistent C4d positive staining on endomyocardial biopsy with negative DSAs. He developed joshua evidence of graft dysfunction leading to treatment with plasmapheresis, IVIG, and rituximab. Subsequently in June of 2018 he was found to have an pneumonia, that was ultimately diagnosed as Nocardia. This was successfully treated with therapy completed in August.\par \par He presents today for routine RHC with biopsy. He currently feels well. He notes that he can walk without shortness of breath, but has ongoing limitations due to chronic right knee pain, which has not changed. He is not currently thought to be a good surgical candidate due to risk of limb loss related to anatomy of his knee. He has no PND or orthopnea and no cough. He denies fevers or chills. He has no abdominal pain, increase in abdominal girth or swelling in the legs. He has no diarrhea or rashes.

## 2019-11-20 NOTE — PHYSICAL EXAM
[General Appearance - Well Developed] : well developed [General Appearance - Well Nourished] : well nourished [Normal Oral Mucosa] : normal oral mucosa [Normal Conjunctiva] : the conjunctiva exhibited no abnormalities [Normal Oropharynx] : normal oropharynx [Respiration, Rhythm And Depth] : normal respiratory rhythm and effort [Auscultation Breath Sounds / Voice Sounds] : lungs were clear to auscultation bilaterally [Heart Rate And Rhythm] : heart rate and rhythm were normal [Heart Sounds] : normal S1 and S2 [Edema] : no peripheral edema present [Arterial Pulses Normal] : the arterial pulses were normal [Bowel Sounds] : normal bowel sounds [Abdomen Soft] : soft [Abdomen Tenderness] : non-tender [Abnormal Walk] : normal gait [Nail Clubbing] : no clubbing of the fingernails [Cyanosis, Localized] : no localized cyanosis [Skin Color & Pigmentation] : normal skin color and pigmentation [Skin Turgor] : normal skin turgor [] : no rash [Impaired Insight] : insight and judgment were intact [Oriented To Time, Place, And Person] : oriented to person, place, and time [No Anxiety] : not feeling anxious [FreeTextEntry1] : Regular, but tachycardic. III/VI diastolic murmur.

## 2019-11-21 ENCOUNTER — RESULT REVIEW (OUTPATIENT)
Age: 69
End: 2019-11-21

## 2019-12-23 NOTE — OCCUPATIONAL THERAPY INITIAL EVALUATION ADULT - MARITAL STATUS
As certified below, I, or a nurse practitioner or physician assistant working with me, had a face-to-face encounter that meets the physician face-to-face encounter requirements. Single

## 2020-01-03 ENCOUNTER — OTHER (OUTPATIENT)
Age: 70
End: 2020-01-03

## 2020-01-07 ENCOUNTER — OTHER (OUTPATIENT)
Age: 70
End: 2020-01-07

## 2020-01-10 ENCOUNTER — OTHER (OUTPATIENT)
Age: 70
End: 2020-01-10

## 2020-01-14 ENCOUNTER — OUTPATIENT (OUTPATIENT)
Dept: OUTPATIENT SERVICES | Facility: HOSPITAL | Age: 70
LOS: 1 days | End: 2020-01-14
Payer: MEDICARE

## 2020-01-14 ENCOUNTER — APPOINTMENT (OUTPATIENT)
Dept: ULTRASOUND IMAGING | Facility: HOSPITAL | Age: 70
End: 2020-01-14
Payer: MEDICARE

## 2020-01-14 DIAGNOSIS — Z94.1 HEART TRANSPLANT STATUS: Chronic | ICD-10-CM

## 2020-01-14 DIAGNOSIS — I82.409 ACUTE EMBOLISM AND THROMBOSIS OF UNSPECIFIED DEEP VEINS OF UNSPECIFIED LOWER EXTREMITY: ICD-10-CM

## 2020-01-14 DIAGNOSIS — Z98.890 OTHER SPECIFIED POSTPROCEDURAL STATES: Chronic | ICD-10-CM

## 2020-01-14 DIAGNOSIS — Z00.00 ENCOUNTER FOR GENERAL ADULT MEDICAL EXAMINATION WITHOUT ABNORMAL FINDINGS: ICD-10-CM

## 2020-01-14 LAB
ALBUMIN SERPL ELPH-MCNC: 4 G/DL
ALP BLD-CCNC: 102 U/L
ALT SERPL-CCNC: 11 U/L
ANION GAP SERPL CALC-SCNC: 10 MMOL/L
AST SERPL-CCNC: 22 U/L
BASOPHILS # BLD AUTO: 0.02 K/UL
BASOPHILS NFR BLD AUTO: 0.4 %
BILIRUB SERPL-MCNC: 1.1 MG/DL
BUN SERPL-MCNC: 33 MG/DL
CALCIUM SERPL-MCNC: 8.8 MG/DL
CHLORIDE SERPL-SCNC: 105 MMOL/L
CO2 SERPL-SCNC: 22 MMOL/L
CREAT SERPL-MCNC: 1.51 MG/DL
EOSINOPHIL # BLD AUTO: 0.05 K/UL
EOSINOPHIL NFR BLD AUTO: 1.1 %
GLUCOSE SERPL-MCNC: 91 MG/DL
HCT VFR BLD CALC: 33.7 %
HGB BLD-MCNC: 9.9 G/DL
IMM GRANULOCYTES NFR BLD AUTO: 0.4 %
LYMPHOCYTES # BLD AUTO: 1.19 K/UL
LYMPHOCYTES NFR BLD AUTO: 26.7 %
MAGNESIUM SERPL-MCNC: 2.1 MG/DL
MAN DIFF?: NORMAL
MCHC RBC-ENTMCNC: 28 PG
MCHC RBC-ENTMCNC: 29.4 GM/DL
MCV RBC AUTO: 95.5 FL
MONOCYTES # BLD AUTO: 0.64 K/UL
MONOCYTES NFR BLD AUTO: 14.4 %
NEUTROPHILS # BLD AUTO: 2.53 K/UL
NEUTROPHILS NFR BLD AUTO: 57 %
PLATELET # BLD AUTO: 225 K/UL
POTASSIUM SERPL-SCNC: 5.8 MMOL/L
PROT SERPL-MCNC: 7.6 G/DL
RBC # BLD: 3.53 M/UL
RBC # FLD: 16.9 %
SODIUM SERPL-SCNC: 137 MMOL/L
TACROLIMUS SERPL-MCNC: 8.2 NG/ML
WBC # FLD AUTO: 4.45 K/UL

## 2020-01-14 PROCEDURE — 93970 EXTREMITY STUDY: CPT | Mod: 26

## 2020-01-14 PROCEDURE — 93970 EXTREMITY STUDY: CPT

## 2020-01-14 NOTE — CONSULT NOTE ADULT - PROBLEM SELECTOR RECOMMENDATION 3
[address]  Progress Note and Procedure Note      Alyce Baker  AGE: 52 y.o. GENDER: female  : 1971  TODAY'S DATE:  2020    Subjective:     CHIEF COMPLAINT sacral wound     HISTORY of PRESENT ILLNESS HPI   Porfirio Bey is a 52 y.o. female who presents today for wound/ulcer evaluation.     Ulcer Type:pressure  Ulcer/Wound Location:sacral  Contributing factors:chronic pressure, decreased mobility and shear force    Patient Active Problem List   Diagnosis Code    Muscle spasticity M62.838    Peripheral vascular disease ([facility]) I73.9    MS (multiple sclerosis) ([facility]) G35    Pain in both upper arms M79.621, M79.622    Numbness R20.0    Other fatigue R53.83    Weakness R53.1    Chronic pain G89.29    Hx of seizure disorder Z86.69    Insomnia G47.00    Decubitus ulcer of sacral region L89.159    Pain, neck M54.2    Venous stasis ulcer of left lower extremity (HCC) I83.029, L97.929    Non-pressure chronic ulcer of other part of right lower leg with fat layer exposed ([address] L97.812    S/P transmetatarsal amputation of foot, left ([facility]) R92.865    Chronic constipation K59.09    Colostomy in place [facility]) Z93.3    Surgical wound breakdown T81.31XA    Pressure injury of sacral region, stage 4 ([facility]) L89.154    Paraplegia ([facility]) G82.20    Arthralgia of hip M25.559       ALLERGIES    Allergies   Allergen Reactions    Darvocet [Propoxyphene N-Acetaminophen]      Talking out of her head    Morphine And Related Itching and Swelling    Propoxyphene Swelling           Objective:      /83   Pulse 86   Temp 97.6 °F (36.4 °C) (Temporal)   Resp 18   Ht 5' 9\" (1.753 m)   Wt 145 lb 11.6 oz (66.1 kg)   BMI 21.52 kg/m²         Assessment:      Problem List Items Addressed This Visit     Venous stasis ulcer of left lower extremity (HCC) (Chronic)    Non-pressure chronic ulcer of other part of right lower leg with fat layer exposed ([address]    S/P transmetatarsal amputation of foot, left (HCC)          The patients pain isPain Level: 8 Pain Type: Acute pain. Wound(s) has slightly improved. Please refer to nursing measurements and assessment regarding wound pre and post debridement. Wound 10/17/19 Coccyx WOUND 65; COCCYX (PRESSSURE 4)  merged with Wound 66 on 11/5/19 (Active)   Wound Image    1/14/2020 11:11 AM   Wound Pressure Stage  4 1/14/2020 11:11 AM   Dressing Status Old drainage 1/14/2020 11:11 AM   Dressing Changed Changed/New 12/31/2019 12:46 PM   Dressing/Treatment Vacuum dressing 12/31/2019 12:46 PM   Wound Cleansed Soap and water 1/14/2020 11:11 AM   Wound Length (cm) 1.9 cm 1/14/2020 11:11 AM   Wound Width (cm) 0.8 cm 1/14/2020 11:11 AM   Wound Depth (cm) 1.2 cm 1/14/2020 11:11 AM   Wound Surface Area (cm^2) 1.52 cm^2 1/14/2020 11:11 AM   Change in Wound Size % (l*w) -660 1/14/2020 11:11 AM   Wound Volume (cm^3) 1.82 cm^3 1/14/2020 11:11 AM   Wound Healing % -1720 1/14/2020 11:11 AM   Post-Procedure Length (cm) 2.5 cm 12/31/2019 12:07 PM   Post-Procedure Width (cm) 1.7 cm 12/31/2019 12:07 PM   Post-Procedure Depth (cm) 1.3 cm 12/31/2019 12:07 PM   Post-Procedure Surface Area (cm^2) 4.25 cm^2 12/31/2019 12:07 PM   Post-Procedure Volume (cm^3) 5.52 cm^3 12/31/2019 12:07 PM   Distance Tunneling (cm) 0 cm 1/14/2020 11:11 AM   Tunneling Position ___ O'Clock 0 1/14/2020 11:11 AM   Undermining Starts ___ O'Clock 10 1/14/2020 11:11 AM   Undermining Ends___ O'Clock 3 1/14/2020 11:11 AM   Undermining Maxium Distance (cm) 1.4 1/14/2020 11:11 AM   Wound Assessment Red;Drainage;Slough; Yellow 1/14/2020 11:11 AM   Drainage Amount Small 1/14/2020 11:11 AM   Drainage Description Serosanguinous 1/14/2020 11:11 AM   Odor None 1/14/2020 11:11 AM   Margins Unattached edges 1/14/2020 11:11 AM   [place]elia-wound Assessment Red 1/14/2020 11:11 AM   Non-staged Wound Description Not applicable 9/70/3237 32:27 AM   [place]%Wound Bed 0 1/14/2020 11:11 AM   Red%Wound Bed 95 1/14/2020 11:11 AM weeks.     Electronically signed by Raul Jarrett MD on 1/14/2020 at 11:42 AM [place] level supratherapeutic at 13   will cont tacro 1/0.5 today   cont prednisone 4 mg qd   f/u TTE   pt off cellcept due to hx of fungal infection - will cont hold   resumed valcyte   CAV: asa/rouvastatin

## 2020-01-15 ENCOUNTER — RESULT REVIEW (OUTPATIENT)
Age: 70
End: 2020-01-15

## 2020-01-16 ENCOUNTER — RESULT REVIEW (OUTPATIENT)
Age: 70
End: 2020-01-16

## 2020-01-16 LAB
ALBUMIN SERPL ELPH-MCNC: 4.1 G/DL
ALP BLD-CCNC: 117 U/L
ALT SERPL-CCNC: 13 U/L
ANION GAP SERPL CALC-SCNC: 10 MMOL/L
AST SERPL-CCNC: 22 U/L
BILIRUB SERPL-MCNC: 1 MG/DL
BUN SERPL-MCNC: 30 MG/DL
CALCIUM SERPL-MCNC: 9.2 MG/DL
CHLORIDE SERPL-SCNC: 102 MMOL/L
CO2 SERPL-SCNC: 22 MMOL/L
CREAT SERPL-MCNC: 1.48 MG/DL
GLUCOSE SERPL-MCNC: 93 MG/DL
POTASSIUM SERPL-SCNC: 5 MMOL/L
PROT SERPL-MCNC: 8.2 G/DL
SODIUM SERPL-SCNC: 134 MMOL/L

## 2020-01-31 NOTE — PATIENT PROFILE ADULT. - MEDICATION HERBAL REMEDIES, PROFILE
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     IMPRESSION  - Acute on chronic anemia with melena: Hb 6 (unknown baselines, patient does take iron supplements at home) Ddx: could be due to UGI bleeding from esophagitis, PUD, erosive gastritis, dieulafoy lesion, angioectasia, stomach cancer  - Fever of 101 in the ER (CXR-, UA-, RVP-), pending cultures, unclear source     RECOMMENDATION    - trend CBC, CMP, INR, transfuse as needed  - please send anemia workup: iron panel, vitamin b12, folate, reticulocyte count  - start pantoprazole with bolus 80mg IV followed by infusion at 8mg/hr  - plan for upper endoscopy, when patient is medically optimised   - will need cardiology clearance for the procedure   - clear liquid diet for now   - supportive care as per primary team      Deb Iglesias, PGY-6  GI fellow  B- [id number]/ [phone]  Please call GI fellow on call after 5pm and on weekends 29 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     IMPRESSION  - Acute on chronic anemia with melena: Hb 6 (unknown baselines, patient does take iron supplements at home, but there is change in BMs) Ddx: could be due to UGI bleeding from esophagitis, PUD, erosive gastritis, dieulafoy lesion, angioectasia, stomach cancer    - Fever of 101 in the ER (CXR-, UA-, RVP-), pending cultures, unclear source     RECOMMENDATION    - trend CBC, CMP, INR, transfuse as needed  - please send anemia workup: iron panel, vitamin b12, folate, reticulocyte count  - start pantoprazole with bolus 80mg IV followed by infusion at 8mg/hr  - plan for upper endoscopy, when patient is medically optimised   - will need cardiology clearance for the procedure   - clear liquid diet for now   - supportive care as per primary team      Deb Iglesias, PGY-6  GI fellow  B- [id number]/ [phone]  Please call GI fellow on call after 5pm and on weekends no

## 2020-02-05 NOTE — PROGRESS NOTE ADULT - PROBLEM SELECTOR PROBLEM 4
Assessment/Plan:    Pancolitis ([address] )  S/p surgery       Diagnoses and all orders for this visit:    Altered taste  -     Vitamin B12; Future  -     Vitamin B6; Future    Pancolitis (HCC)    Anxiety    Microcytic anemia  -     CBC and differential; Future  -     Iron; Future  -     Ferritin; Future    Vitamin D deficiency  -     Vitamin D 25 hydroxy; Future    Numbness around mouth  -     Vitamin B12; Future  -     Vitamin B6; Future    Hair loss  -     Comprehensive metabolic panel; Future  -     TSH, 3rd generation; Future          Subjective:      Patient ID: Génesis Addison is a 46 y o  female  Here for hair loss, fatigue,altered sensation in tongue and mouth, very frustrated with ileostomy leaking, also stressed with parents living with her while their apartment is being remodeled      The following portions of the patient's history were reviewed and updated as appropriate: allergies, current medications, past family history, past medical history, past social history, past surgical history and problem list       Review of Systems   Constitutional: Negative for activity change, appetite change, fatigue and unexpected weight change  Respiratory: Negative for shortness of breath  Cardiovascular: Negative for chest pain  Skin:        Hair loss   Neurological: Positive for numbness  Around tongue and mouth   Psychiatric/Behavioral: Positive for dysphoric mood  The patient is not nervous/anxious  Objective:      /72   Pulse 74   Resp 16   Ht 5' 4" (1 626 m)   Wt 95 3 kg (210 lb)   LMP 08/05/2019 (Approximate)   BMI 36 05 kg/m²          Physical Exam   Constitutional: She appears well-developed and well-nourished  HENT:   Nose: Nose normal    Mouth/Throat: Oropharynx is clear and moist    Neck: No thyromegaly present  Cardiovascular: Normal rate, regular rhythm and normal heart sounds     Pulmonary/Chest: Effort normal and breath sounds normal    Musculoskeletal: She exhibits no
edema    Lymphadenopathy:     She has no cervical adenopathy  Vitals reviewed 
Near syncope
Near syncope

## 2020-02-07 NOTE — H&P CARDIOLOGY - GASTROINTESTINAL
Recall placed. Surgical history updated.    
Soft, non-tender, no hepatosplenomegaly, normal bowel sounds

## 2020-02-11 NOTE — H&P ADULT. - VTE RISK COMMENTS
Pt stable. IV removed. Follow up with PCP. Prescription x1.  Ambulatory, discharged to home Low risk

## 2020-02-12 ENCOUNTER — APPOINTMENT (OUTPATIENT)
Dept: HEART FAILURE | Facility: CLINIC | Age: 70
End: 2020-02-12
Payer: MEDICARE

## 2020-02-12 ENCOUNTER — OUTPATIENT (OUTPATIENT)
Dept: OUTPATIENT SERVICES | Facility: HOSPITAL | Age: 70
LOS: 1 days | End: 2020-02-12
Payer: MEDICARE

## 2020-02-12 ENCOUNTER — RESULT REVIEW (OUTPATIENT)
Age: 70
End: 2020-02-12

## 2020-02-12 VITALS
RESPIRATION RATE: 16 BRPM | SYSTOLIC BLOOD PRESSURE: 134 MMHG | WEIGHT: 165 LBS | BODY MASS INDEX: 25.01 KG/M2 | OXYGEN SATURATION: 99 % | HEART RATE: 110 BPM | DIASTOLIC BLOOD PRESSURE: 87 MMHG | TEMPERATURE: 97.7 F | HEIGHT: 68 IN

## 2020-02-12 DIAGNOSIS — Z94.1 HEART TRANSPLANT STATUS: ICD-10-CM

## 2020-02-12 DIAGNOSIS — Z98.890 OTHER SPECIFIED POSTPROCEDURAL STATES: Chronic | ICD-10-CM

## 2020-02-12 DIAGNOSIS — Z94.1 HEART TRANSPLANT STATUS: Chronic | ICD-10-CM

## 2020-02-12 DIAGNOSIS — N25.89 OTHER DISORDERS RESULTING FROM IMPAIRED RENAL TUBULAR FUNCTION: ICD-10-CM

## 2020-02-12 LAB
ALBUMIN SERPL ELPH-MCNC: 4.2 G/DL — SIGNIFICANT CHANGE UP (ref 3.3–5)
ALP SERPL-CCNC: 102 U/L — SIGNIFICANT CHANGE UP (ref 40–120)
ALT FLD-CCNC: 14 U/L — SIGNIFICANT CHANGE UP (ref 10–45)
ANION GAP SERPL CALC-SCNC: 11 MMOL/L — SIGNIFICANT CHANGE UP (ref 5–17)
AST SERPL-CCNC: 21 U/L — SIGNIFICANT CHANGE UP (ref 10–40)
BASOPHILS # BLD AUTO: 0.03 K/UL — SIGNIFICANT CHANGE UP (ref 0–0.2)
BASOPHILS NFR BLD AUTO: 0.7 % — SIGNIFICANT CHANGE UP (ref 0–2)
BILIRUB SERPL-MCNC: 1.3 MG/DL — HIGH (ref 0.2–1.2)
BUN SERPL-MCNC: 31 MG/DL — HIGH (ref 7–23)
CALCIUM SERPL-MCNC: 9.3 MG/DL — SIGNIFICANT CHANGE UP (ref 8.4–10.5)
CHLORIDE SERPL-SCNC: 102 MMOL/L — SIGNIFICANT CHANGE UP (ref 96–108)
CO2 SERPL-SCNC: 22 MMOL/L — SIGNIFICANT CHANGE UP (ref 22–31)
CREAT SERPL-MCNC: 1.49 MG/DL — HIGH (ref 0.5–1.3)
EOSINOPHIL # BLD AUTO: 0.06 K/UL — SIGNIFICANT CHANGE UP (ref 0–0.5)
EOSINOPHIL NFR BLD AUTO: 1.4 % — SIGNIFICANT CHANGE UP (ref 0–6)
GLUCOSE SERPL-MCNC: 93 MG/DL — SIGNIFICANT CHANGE UP (ref 70–99)
HCT VFR BLD CALC: 34.3 % — LOW (ref 39–50)
HGB BLD-MCNC: 10.4 G/DL — LOW (ref 13–17)
IMM GRANULOCYTES NFR BLD AUTO: 0.2 % — SIGNIFICANT CHANGE UP (ref 0–1.5)
LYMPHOCYTES # BLD AUTO: 1.11 K/UL — SIGNIFICANT CHANGE UP (ref 1–3.3)
LYMPHOCYTES # BLD AUTO: 26.2 % — SIGNIFICANT CHANGE UP (ref 13–44)
MAGNESIUM SERPL-MCNC: 2.2 MG/DL — SIGNIFICANT CHANGE UP (ref 1.6–2.6)
MCHC RBC-ENTMCNC: 28.7 PG — SIGNIFICANT CHANGE UP (ref 27–34)
MCHC RBC-ENTMCNC: 30.3 GM/DL — LOW (ref 32–36)
MCV RBC AUTO: 94.8 FL — SIGNIFICANT CHANGE UP (ref 80–100)
MONOCYTES # BLD AUTO: 0.56 K/UL — SIGNIFICANT CHANGE UP (ref 0–0.9)
MONOCYTES NFR BLD AUTO: 13.2 % — SIGNIFICANT CHANGE UP (ref 2–14)
NEUTROPHILS # BLD AUTO: 2.46 K/UL — SIGNIFICANT CHANGE UP (ref 1.8–7.4)
NEUTROPHILS NFR BLD AUTO: 58.3 % — SIGNIFICANT CHANGE UP (ref 43–77)
NRBC # BLD: 0 /100 WBCS — SIGNIFICANT CHANGE UP (ref 0–0)
PLATELET # BLD AUTO: 227 K/UL — SIGNIFICANT CHANGE UP (ref 150–400)
POTASSIUM SERPL-MCNC: 4.9 MMOL/L — SIGNIFICANT CHANGE UP (ref 3.5–5.3)
POTASSIUM SERPL-SCNC: 4.9 MMOL/L — SIGNIFICANT CHANGE UP (ref 3.5–5.3)
PROT SERPL-MCNC: 8.6 G/DL — HIGH (ref 6–8.3)
RBC # BLD: 3.62 M/UL — LOW (ref 4.2–5.8)
RBC # FLD: 15.6 % — HIGH (ref 10.3–14.5)
SODIUM SERPL-SCNC: 135 MMOL/L — SIGNIFICANT CHANGE UP (ref 135–145)
TACROLIMUS SERPL-MCNC: 7.3 NG/ML — SIGNIFICANT CHANGE UP
WBC # BLD: 4.23 K/UL — SIGNIFICANT CHANGE UP (ref 3.8–10.5)
WBC # FLD AUTO: 4.23 K/UL — SIGNIFICANT CHANGE UP (ref 3.8–10.5)

## 2020-02-12 PROCEDURE — ZZZZZ: CPT

## 2020-02-12 PROCEDURE — 88307 TISSUE EXAM BY PATHOLOGIST: CPT

## 2020-02-12 PROCEDURE — C1894: CPT

## 2020-02-12 PROCEDURE — 99152 MOD SED SAME PHYS/QHP 5/>YRS: CPT

## 2020-02-12 PROCEDURE — 80197 ASSAY OF TACROLIMUS: CPT

## 2020-02-12 PROCEDURE — 88307 TISSUE EXAM BY PATHOLOGIST: CPT | Mod: 26

## 2020-02-12 PROCEDURE — 83735 ASSAY OF MAGNESIUM: CPT

## 2020-02-12 PROCEDURE — 93306 TTE W/DOPPLER COMPLETE: CPT

## 2020-02-12 PROCEDURE — 88346 IMFLUOR 1ST 1ANTB STAIN PX: CPT

## 2020-02-12 PROCEDURE — 85027 COMPLETE CBC AUTOMATED: CPT

## 2020-02-12 PROCEDURE — 93306 TTE W/DOPPLER COMPLETE: CPT | Mod: 26

## 2020-02-12 PROCEDURE — 99153 MOD SED SAME PHYS/QHP EA: CPT

## 2020-02-12 PROCEDURE — 93005 ELECTROCARDIOGRAM TRACING: CPT

## 2020-02-12 PROCEDURE — 93010 ELECTROCARDIOGRAM REPORT: CPT

## 2020-02-12 PROCEDURE — 93505 ENDOMYOCARDIAL BIOPSY: CPT | Mod: 26

## 2020-02-12 PROCEDURE — 88346 IMFLUOR 1ST 1ANTB STAIN PX: CPT | Mod: 26

## 2020-02-12 PROCEDURE — 93505 ENDOMYOCARDIAL BIOPSY: CPT

## 2020-02-12 PROCEDURE — C1889: CPT

## 2020-02-12 PROCEDURE — C1769: CPT

## 2020-02-12 PROCEDURE — C1884: CPT

## 2020-02-12 PROCEDURE — 80053 COMPREHEN METABOLIC PANEL: CPT

## 2020-02-12 NOTE — PHYSICAL EXAM
[General Appearance - Well Developed] : well developed [Normal Conjunctiva] : the conjunctiva exhibited no abnormalities [General Appearance - Well Nourished] : well nourished [Normal Oral Mucosa] : normal oral mucosa [Normal Oropharynx] : normal oropharynx [Auscultation Breath Sounds / Voice Sounds] : lungs were clear to auscultation bilaterally [Respiration, Rhythm And Depth] : normal respiratory rhythm and effort [Heart Rate And Rhythm] : heart rate and rhythm were normal [Heart Sounds] : normal S1 and S2 [Arterial Pulses Normal] : the arterial pulses were normal [Edema] : no peripheral edema present [Bowel Sounds] : normal bowel sounds [Abdomen Soft] : soft [Abdomen Tenderness] : non-tender [Cyanosis, Localized] : no localized cyanosis [Nail Clubbing] : no clubbing of the fingernails [Abnormal Walk] : normal gait [Skin Turgor] : normal skin turgor [Skin Color & Pigmentation] : normal skin color and pigmentation [] : no rash [Oriented To Time, Place, And Person] : oriented to person, place, and time [No Anxiety] : not feeling anxious [Impaired Insight] : insight and judgment were intact [FreeTextEntry1] : Regular, but tachycardic. III/VI diastolic murmur.

## 2020-02-12 NOTE — HISTORY OF PRESENT ILLNESS
[FreeTextEntry1] : Mr. Baez is a 69 year old man with past medical history of a nonischemic cardiomyopathy and chronic systolic heart failure s/p HM2 LVAD in June 2017 complicated by recurrent GI hemorrhage and possible pump thrombosis who underwent heart transplant on 2/23/18 with a hepatitis C positive donor. His course was complicated by bilateral IJ/subclavian thrombi, a persistent small right sided pleural effusion, as well as acute on chronic renal failure of unclear etiology. In addition, his post-operative course was complicated by graft dysfunction of unclear etiology and persistent C4d positive staining on endomyocardial biopsy with negative DSAs. He developed joshua evidence of graft dysfunction leading to treatment with plasmapheresis, IVIG, and rituximab. Subsequently in June of 2018 he was found to have an pneumonia, that was ultimately diagnosed as Nocardia. This was successfully treated with therapy completed in August.\par \par He presents today for routine RHC with biopsy. He currently feels well. He notes that he can walk without shortness of breath, but has ongoing limitations due to chronic right knee pain, which has not changed.  He has no PND or orthopnea and no cough. He denies fevers or chills. He has no abdominal pain, increase in abdominal girth or swelling in the legs. He has no diarrhea or rashes.

## 2020-02-12 NOTE — DISCUSSION/SUMMARY
[FreeTextEntry1] : - Immunosuppression: We will continue him on current dose of Prograf. Goal dose Prograf  level 8-10. He remains off of prednisone and is on CellCept 500 mg BID. \par - S/P Heart transplantation: He has no evidence of clinically significant graft dysfunction or congestive heart failure on exam. 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 He has no significant CAV. \par - History of stage III CKD with RTA: This appears to have resolved. Given that he is of of Bactrim, we will discontinue the fludrocortisone, which may assist in improving his blood pressure. \par - Antibiotic prophylaxis:CMV (intermediate risk): None currently required. \par - CAV prophylaxis/hyperlipidemia: Continue ASA 81mg daily and pravachol 20 mg daily.\par - Ulcer prophylaxis: Continue famotidine 20mg daily.\par - Osteoporosis prophylaxis: Continue calcium + vit D 2 tabs BID.\par - Hypomagnesemia: Continue magnesium oxide 1200 mg daily.\par - Elevated PSA: Stable.\par - Chronic right knee pain, osteoarthritis: Continue to monitor.

## 2020-02-12 NOTE — ASSESSMENT
[FreeTextEntry1] : Mr. Baez is a 69 year old man with history of NICM, s/p heart transplantation on 2/23/18. 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 Early post transplant he received therapy with IVIG, plasmapheresis, and rituximab with course complicated by development of Nocardia pulmonary infection, which is resolved. His RHC today shows normal filling pressures and cardiac output.  He is normotensive.

## 2020-02-14 LAB
CMV DNA CSF QL NAA+PROBE: SIGNIFICANT CHANGE UP
CMV DNA SPEC NAA+PROBE-LOG#: ABNORMAL LOG10IU/ML

## 2020-02-20 ENCOUNTER — OUTPATIENT (OUTPATIENT)
Dept: OUTPATIENT SERVICES | Facility: HOSPITAL | Age: 70
LOS: 1 days | End: 2020-02-20
Payer: MEDICARE

## 2020-02-20 ENCOUNTER — APPOINTMENT (OUTPATIENT)
Dept: CV DIAGNOSITCS | Facility: HOSPITAL | Age: 70
End: 2020-02-20

## 2020-02-20 DIAGNOSIS — Z94.1 HEART TRANSPLANT STATUS: Chronic | ICD-10-CM

## 2020-02-20 DIAGNOSIS — I35.0 NONRHEUMATIC AORTIC (VALVE) STENOSIS: ICD-10-CM

## 2020-02-20 DIAGNOSIS — Z98.890 OTHER SPECIFIED POSTPROCEDURAL STATES: Chronic | ICD-10-CM

## 2020-02-20 PROCEDURE — 93320 DOPPLER ECHO COMPLETE: CPT

## 2020-02-20 PROCEDURE — 93325 DOPPLER ECHO COLOR FLOW MAPG: CPT

## 2020-02-20 PROCEDURE — 93320 DOPPLER ECHO COMPLETE: CPT | Mod: 26

## 2020-02-20 PROCEDURE — C8930: CPT

## 2020-02-20 PROCEDURE — 93018 CV STRESS TEST I&R ONLY: CPT

## 2020-02-20 PROCEDURE — 93325 DOPPLER ECHO COLOR FLOW MAPG: CPT | Mod: 26

## 2020-02-20 PROCEDURE — 93350 STRESS TTE ONLY: CPT | Mod: 26

## 2020-02-20 PROCEDURE — 93016 CV STRESS TEST SUPVJ ONLY: CPT

## 2020-03-17 ENCOUNTER — NON-APPOINTMENT (OUTPATIENT)
Age: 70
End: 2020-03-17

## 2020-03-17 NOTE — PATIENT PROFILE ADULT. - ACCEPTABLE
Sibling  Still living? Unknown  Family history of diabetes mellitus in brother, Age at diagnosis: Age Unknown
2

## 2020-03-27 ENCOUNTER — APPOINTMENT (OUTPATIENT)
Dept: ENDOCRINOLOGY | Facility: CLINIC | Age: 70
End: 2020-03-27

## 2020-03-27 NOTE — PROCEDURE NOTE - NSPERFORMEDBY_GEN_A_CORE
Pt requesting a ov to come in ( or by phone ) for right side body pain and for UTI pt is having a burning sensation is it ok to schedule pt appt LUCY BRAVO/PA

## 2020-04-29 ENCOUNTER — INPATIENT (INPATIENT)
Facility: HOSPITAL | Age: 70
LOS: 7 days | Discharge: HOME | DRG: 809 | End: 2020-05-07
Attending: THORACIC SURGERY (CARDIOTHORACIC VASCULAR SURGERY) | Admitting: THORACIC SURGERY (CARDIOTHORACIC VASCULAR SURGERY)
Payer: MEDICARE

## 2020-04-29 VITALS
DIASTOLIC BLOOD PRESSURE: 83 MMHG | WEIGHT: 162.04 LBS | SYSTOLIC BLOOD PRESSURE: 134 MMHG | TEMPERATURE: 98 F | OXYGEN SATURATION: 99 % | HEIGHT: 68 IN | HEART RATE: 108 BPM | RESPIRATION RATE: 12 BRPM

## 2020-04-29 DIAGNOSIS — Z98.890 OTHER SPECIFIED POSTPROCEDURAL STATES: Chronic | ICD-10-CM

## 2020-04-29 DIAGNOSIS — R07.9 CHEST PAIN, UNSPECIFIED: ICD-10-CM

## 2020-04-29 DIAGNOSIS — Z94.1 HEART TRANSPLANT STATUS: Chronic | ICD-10-CM

## 2020-04-29 LAB
ALBUMIN SERPL ELPH-MCNC: 4.1 G/DL — SIGNIFICANT CHANGE UP (ref 3.3–5)
ALBUMIN SERPL ELPH-MCNC: 4.2 G/DL — SIGNIFICANT CHANGE UP (ref 3.3–5)
ALP SERPL-CCNC: 78 U/L — SIGNIFICANT CHANGE UP (ref 40–120)
ALP SERPL-CCNC: 79 U/L — SIGNIFICANT CHANGE UP (ref 40–120)
ALT FLD-CCNC: 10 U/L — SIGNIFICANT CHANGE UP (ref 10–45)
ALT FLD-CCNC: 7 U/L — LOW (ref 10–45)
ANION GAP SERPL CALC-SCNC: 12 MMOL/L — SIGNIFICANT CHANGE UP (ref 5–17)
ANION GAP SERPL CALC-SCNC: 14 MMOL/L — SIGNIFICANT CHANGE UP (ref 5–17)
APTT BLD: 30.6 SEC — SIGNIFICANT CHANGE UP (ref 27.5–36.3)
AST SERPL-CCNC: 24 U/L — SIGNIFICANT CHANGE UP (ref 10–40)
AST SERPL-CCNC: 44 U/L — HIGH (ref 10–40)
BASE EXCESS BLDV CALC-SCNC: -0.9 MMOL/L — SIGNIFICANT CHANGE UP (ref -2–2)
BASOPHILS # BLD AUTO: 0.01 K/UL — SIGNIFICANT CHANGE UP (ref 0–0.2)
BASOPHILS NFR BLD AUTO: 0.3 % — SIGNIFICANT CHANGE UP (ref 0–2)
BILIRUB SERPL-MCNC: 2.6 MG/DL — HIGH (ref 0.2–1.2)
BILIRUB SERPL-MCNC: 2.6 MG/DL — HIGH (ref 0.2–1.2)
BUN SERPL-MCNC: 35 MG/DL — HIGH (ref 7–23)
BUN SERPL-MCNC: 36 MG/DL — HIGH (ref 7–23)
CA-I SERPL-SCNC: 1.25 MMOL/L — SIGNIFICANT CHANGE UP (ref 1.12–1.3)
CALCIUM SERPL-MCNC: 9.1 MG/DL — SIGNIFICANT CHANGE UP (ref 8.4–10.5)
CALCIUM SERPL-MCNC: 9.6 MG/DL — SIGNIFICANT CHANGE UP (ref 8.4–10.5)
CHLORIDE BLDV-SCNC: 107 MMOL/L — SIGNIFICANT CHANGE UP (ref 96–108)
CHLORIDE SERPL-SCNC: 103 MMOL/L — SIGNIFICANT CHANGE UP (ref 96–108)
CHLORIDE SERPL-SCNC: 104 MMOL/L — SIGNIFICANT CHANGE UP (ref 96–108)
CK MB BLD-MCNC: 1.7 % — SIGNIFICANT CHANGE UP (ref 0–3.5)
CK MB CFR SERPL CALC: 2 NG/ML — SIGNIFICANT CHANGE UP (ref 0–6.7)
CK SERPL-CCNC: 115 U/L — SIGNIFICANT CHANGE UP (ref 30–200)
CO2 BLDV-SCNC: 26 MMOL/L — SIGNIFICANT CHANGE UP (ref 22–30)
CO2 SERPL-SCNC: 17 MMOL/L — LOW (ref 22–31)
CO2 SERPL-SCNC: 22 MMOL/L — SIGNIFICANT CHANGE UP (ref 22–31)
CREAT SERPL-MCNC: 1.49 MG/DL — HIGH (ref 0.5–1.3)
CREAT SERPL-MCNC: 1.53 MG/DL — HIGH (ref 0.5–1.3)
EOSINOPHIL # BLD AUTO: 0.02 K/UL — SIGNIFICANT CHANGE UP (ref 0–0.5)
EOSINOPHIL NFR BLD AUTO: 0.6 % — SIGNIFICANT CHANGE UP (ref 0–6)
GAS PNL BLDV: 138 MMOL/L — SIGNIFICANT CHANGE UP (ref 135–145)
GAS PNL BLDV: SIGNIFICANT CHANGE UP
GLUCOSE BLDV-MCNC: 87 MG/DL — SIGNIFICANT CHANGE UP (ref 70–99)
GLUCOSE SERPL-MCNC: 84 MG/DL — SIGNIFICANT CHANGE UP (ref 70–99)
GLUCOSE SERPL-MCNC: 91 MG/DL — SIGNIFICANT CHANGE UP (ref 70–99)
HCO3 BLDV-SCNC: 24 MMOL/L — SIGNIFICANT CHANGE UP (ref 21–29)
HCT VFR BLD CALC: 26 % — LOW (ref 39–50)
HCT VFR BLDA CALC: 26 % — LOW (ref 39–50)
HGB BLD CALC-MCNC: 8.4 G/DL — LOW (ref 13–17)
HGB BLD-MCNC: 8.1 G/DL — LOW (ref 13–17)
IMM GRANULOCYTES NFR BLD AUTO: 0.3 % — SIGNIFICANT CHANGE UP (ref 0–1.5)
INR BLD: 1.16 RATIO — SIGNIFICANT CHANGE UP (ref 0.88–1.16)
LACTATE BLDV-MCNC: 1.1 MMOL/L — SIGNIFICANT CHANGE UP (ref 0.7–2)
LYMPHOCYTES # BLD AUTO: 0.7 K/UL — LOW (ref 1–3.3)
LYMPHOCYTES # BLD AUTO: 19.5 % — SIGNIFICANT CHANGE UP (ref 13–44)
MAGNESIUM SERPL-MCNC: 2 MG/DL — SIGNIFICANT CHANGE UP (ref 1.6–2.6)
MCHC RBC-ENTMCNC: 30 PG — SIGNIFICANT CHANGE UP (ref 27–34)
MCHC RBC-ENTMCNC: 31.2 GM/DL — LOW (ref 32–36)
MCV RBC AUTO: 96.3 FL — SIGNIFICANT CHANGE UP (ref 80–100)
MONOCYTES # BLD AUTO: 0.34 K/UL — SIGNIFICANT CHANGE UP (ref 0–0.9)
MONOCYTES NFR BLD AUTO: 9.5 % — SIGNIFICANT CHANGE UP (ref 2–14)
NEUTROPHILS # BLD AUTO: 2.51 K/UL — SIGNIFICANT CHANGE UP (ref 1.8–7.4)
NEUTROPHILS NFR BLD AUTO: 69.8 % — SIGNIFICANT CHANGE UP (ref 43–77)
NRBC # BLD: 0 /100 WBCS — SIGNIFICANT CHANGE UP (ref 0–0)
NT-PROBNP SERPL-SCNC: 522 PG/ML — HIGH (ref 0–300)
OTHER CELLS CSF MANUAL: 6 ML/DL — LOW (ref 18–22)
PCO2 BLDV: 48 MMHG — SIGNIFICANT CHANGE UP (ref 35–50)
PH BLDV: 7.33 — LOW (ref 7.35–7.45)
PHOSPHATE SERPL-MCNC: 4.1 MG/DL — SIGNIFICANT CHANGE UP (ref 2.5–4.5)
PLATELET # BLD AUTO: 241 K/UL — SIGNIFICANT CHANGE UP (ref 150–400)
PO2 BLDV: 32 MMHG — SIGNIFICANT CHANGE UP (ref 25–45)
POTASSIUM BLDV-SCNC: 6.1 MMOL/L — HIGH (ref 3.5–5.3)
POTASSIUM SERPL-MCNC: 5.9 MMOL/L — HIGH (ref 3.5–5.3)
POTASSIUM SERPL-MCNC: 6.6 MMOL/L — CRITICAL HIGH (ref 3.5–5.3)
POTASSIUM SERPL-SCNC: 5.9 MMOL/L — HIGH (ref 3.5–5.3)
POTASSIUM SERPL-SCNC: 6.6 MMOL/L — CRITICAL HIGH (ref 3.5–5.3)
PROCALCITONIN SERPL-MCNC: 0.23 NG/ML — HIGH (ref 0.02–0.1)
PROT SERPL-MCNC: 8 G/DL — SIGNIFICANT CHANGE UP (ref 6–8.3)
PROT SERPL-MCNC: 8.9 G/DL — HIGH (ref 6–8.3)
PROTHROM AB SERPL-ACNC: 13.3 SEC — HIGH (ref 10–12.9)
RBC # BLD: 2.7 M/UL — LOW (ref 4.2–5.8)
RBC # FLD: 15.5 % — HIGH (ref 10.3–14.5)
SAO2 % BLDV: 52 % — LOW (ref 67–88)
SARS-COV-2 RNA SPEC QL NAA+PROBE: SIGNIFICANT CHANGE UP
SODIUM SERPL-SCNC: 134 MMOL/L — LOW (ref 135–145)
SODIUM SERPL-SCNC: 138 MMOL/L — SIGNIFICANT CHANGE UP (ref 135–145)
TACROLIMUS SERPL-MCNC: 6.8 NG/ML — SIGNIFICANT CHANGE UP
TROPONIN T, HIGH SENSITIVITY RESULT: 36 NG/L — SIGNIFICANT CHANGE UP (ref 0–51)
TROPONIN T, HIGH SENSITIVITY RESULT: 40 NG/L — SIGNIFICANT CHANGE UP (ref 0–51)
WBC # BLD: 3.59 K/UL — LOW (ref 3.8–10.5)
WBC # FLD AUTO: 3.59 K/UL — LOW (ref 3.8–10.5)

## 2020-04-29 PROCEDURE — 99285 EMERGENCY DEPT VISIT HI MDM: CPT | Mod: 25,GC

## 2020-04-29 PROCEDURE — 71045 X-RAY EXAM CHEST 1 VIEW: CPT | Mod: 26

## 2020-04-29 PROCEDURE — 93010 ELECTROCARDIOGRAM REPORT: CPT | Mod: 59,GC

## 2020-04-29 PROCEDURE — 76705 ECHO EXAM OF ABDOMEN: CPT | Mod: 26

## 2020-04-29 PROCEDURE — 99223 1ST HOSP IP/OBS HIGH 75: CPT

## 2020-04-29 RX ORDER — MYCOPHENOLATE MOFETIL 250 MG/1
500 CAPSULE ORAL
Refills: 0 | Status: DISCONTINUED | OUTPATIENT
Start: 2020-04-29 | End: 2020-05-07

## 2020-04-29 RX ORDER — TACROLIMUS 5 MG/1
5 CAPSULE ORAL
Refills: 0 | Status: DISCONTINUED | OUTPATIENT
Start: 2020-04-29 | End: 2020-05-06

## 2020-04-29 RX ORDER — SODIUM CHLORIDE 9 MG/ML
3 INJECTION INTRAMUSCULAR; INTRAVENOUS; SUBCUTANEOUS EVERY 8 HOURS
Refills: 0 | Status: DISCONTINUED | OUTPATIENT
Start: 2020-04-29 | End: 2020-05-07

## 2020-04-29 RX ORDER — SODIUM BICARBONATE 1 MEQ/ML
650 SYRINGE (ML) INTRAVENOUS
Refills: 0 | Status: DISCONTINUED | OUTPATIENT
Start: 2020-04-29 | End: 2020-05-07

## 2020-04-29 RX ORDER — MAGNESIUM OXIDE 400 MG ORAL TABLET 241.3 MG
400 TABLET ORAL DAILY
Refills: 0 | Status: DISCONTINUED | OUTPATIENT
Start: 2020-04-29 | End: 2020-05-07

## 2020-04-29 RX ORDER — ASPIRIN/CALCIUM CARB/MAGNESIUM 324 MG
81 TABLET ORAL DAILY
Refills: 0 | Status: DISCONTINUED | OUTPATIENT
Start: 2020-04-29 | End: 2020-05-07

## 2020-04-29 RX ADMIN — SODIUM CHLORIDE 3 MILLILITER(S): 9 INJECTION INTRAMUSCULAR; INTRAVENOUS; SUBCUTANEOUS at 20:58

## 2020-04-29 RX ADMIN — TACROLIMUS 5 MILLIGRAM(S): 5 CAPSULE ORAL at 21:02

## 2020-04-29 RX ADMIN — Medication 20 MILLIGRAM(S): at 21:02

## 2020-04-29 NOTE — ED CLERICAL - NS ED CLERK NOTE PRE-ARRIVAL INFORMATION; ADDITIONAL PRE-ARRIVAL INFORMATION
CC/Reason For referral: R/o Covid; Heart transplant 2/23/18; Covid Symptoms   Preferred Consultant(if applicable):  Who admits for you (if needed):  Do you have documents you would like to fax over? No   Would you still like to speak to an ED attending? Yes

## 2020-04-29 NOTE — ED PROVIDER NOTE - CLINICAL SUMMARY MEDICAL DECISION MAKING FREE TEXT BOX
70M PMH NICM HFrEF s/p HM2 LVAD 6/2017, who underwent heart transplant on 2/23/18 (CMV D-/R+ and Toxo D-/R+, Hep C+ heart) presenting for evaluation of chest pain x2 days, worst approximately 4 hrs ago, now well appearing states spontaneously resolved to a 1/10 in pain, will check labs, cxr, ekg, follow up studies, reassess, dispo.

## 2020-04-29 NOTE — ED ADULT NURSE NOTE - OBJECTIVE STATEMENT
70 y.o M A&Ox3 with PMH of HLD, former smoker, DVT, hepatitis C, GI bleed, v fib s/p AICD, PAF,  SVT, HTN, heart transplant presents to the ED via EMS c.o chest pain. Pt. reports he suddenly developed chest discomfort, radiating to ABD and back a couple of days ago which resolved spontaneously. Pt. reports this morning pain returned at its worst. Describes pain as "stabbing/throbbing" sensation. Denies nausea, vomiting, diaphoresis, HA, numbness/tingling sensation, dizziness. Pt. reports symptoms lasted about 4 hours and then resolved spontaneously. Pt. called cardiologist and was advised to come to the ED for further evaluation. Received 162 mg of aspirin by EMS. EMS reports pt. decline nitro. Pt. sclera appears slightly yellow. EKG done. Pt. placed on CM. Ambulates with cane. Safety and comfort provided.

## 2020-04-29 NOTE — ED PROCEDURE NOTE - PROCEDURE ADDITIONAL DETAILS
Emergency Department Focused Ultrasound performed at patient's bedside.  The complete report can be found in PACS.
Tourniquet was placed on the patient's right extremity at the level of the mid arm. Vasculature was visualized using linear probe and sequential compression from the anterior proximal forearm to the distal arm along the margin of the antecubital fossae. This was performed in a fashion as to visualize and differentiate venous structures from arterial vessels. A large superficial vein was selected and the forearm was cleansed with chlorhexadine to debulk bacteria from the area of venopuncture. A 20 gauge 1.88cm angiocatheter was advanced along with the probe in short axis view of the vein to allow for visualization of the tip of the needle entering the vein. The angiocatheter was advanced as a unit approximately 1 more cm and then the catheter was advanced separate with good return of blood, catheter was iv locked in the typical fashion with standard tubing and a luer-lock device. Labs were drawn as needed and the iv tubing was flushed using a standard saline flush.

## 2020-04-29 NOTE — ED ADULT NURSE REASSESSMENT NOTE - NS ED NURSE REASSESS COMMENT FT1
Report received from DIXON Artis. Patient intubated, setting confirmed, no acute distress, resp nonlabored, resting comfortably in stretcher. Pt placed in position of comfort. Non-skid socks on, bed in lowest position, wheels locked, appropriate side rails raised. Pt denies needs at this time.

## 2020-04-29 NOTE — ED PROVIDER NOTE - ATTENDING CONTRIBUTION TO CARE
70y M hx of NICM sp heart transplant on anti-rejection meds presents from home with c/o chest pain for past 2 days. Describes as intermittent, stabbing and throbbing, across upper anterior chest, no change with position. No SOB, cough, fever. No LE swelling. Had recent heart bx and stress echo in Feb 2020, no signs of rejection, EF 50-55%, no inducible ischemia. He is well appearing, mild tachycardia, EKG unchanged from baseline, no hypoxia. normal lung sounds. Abd soft ntnd, ext wwp. Unlikely ACS w recent neg stress echo. Do not suspect dissection or PE. Will check labs, trops, CCM. Call placed to transplant who wants pt evaluated by heart failure attending and COVID swab. 70y M hx of NIC sp heart transplant on anti-rejection meds presents from home with c/o chest pain for past 2 days. Pain free at present. Describes as intermittent, stabbing and throbbing, across upper anterior chest, no change with position. No SOB, cough, fever. No LE swelling. Had recent heart bx and stress echo in Feb 2020, no signs of rejection, EF 50-55%, no inducible ischemia. He is well appearing, mild tachycardia, EKG unchanged from baseline, no hypoxia. normal lung sounds. Abd soft ntnd, ext wwp. Unlikely ACS w recent neg stress echo. Do not suspect dissection or PE. Will check labs, trops, CCM. Call placed to transplant who wants pt evaluated by heart failure attending and COVID swab. 70y M hx of [facility] sp heart transplant on anti-rejection meds presents from home with c/o chest pain for past 2 days. Pain free at present. Describes as intermittent, stabbing and throbbing, across upper anterior chest, no change with position. No SOB, cough, fever. No LE swelling. Had recent heart bx and stress echo in Feb 2020, no signs of rejection, EF 50-55%, no inducible ischemia. He is well appearing, mild tachycardia, EKG unchanged from baseline, no hypoxia. normal lung sounds. Abd soft ntnd, ext wwp. Unlikely ACS w recent neg stress echo. Do not suspect dissection or PE. Will check labs, trops, CCM. Call placed to transplant who wants pt evaluated by heart failure attending (no overt si/sx of HF at present) and COVID swab.

## 2020-04-29 NOTE — ED PROVIDER NOTE - PHYSICAL EXAMINATION
Gen: NAD, non-toxic, conversational  Eyes: PERRL, EOMI   HENT: Normocephalic, atraumatic. External ears normal, no rhinorrhea, moist mucous membranes.   CV: RRR, +3/6 LLSB murmur, +3/6 left inferior axillary region murmur  Resp: CTAB, non-labored, speaking without difficulty on room air  Abd: soft, non tender, non rigid, no guarding or rebound tenderness  Back: No CVAT bilaterally, no midline ttp  Skin: dry, wwp   Neuro: AOx3, speech is fluent and appropriate  Psych: Mood concerned, affect euthymic

## 2020-04-29 NOTE — ED PROVIDER NOTE - OBJECTIVE STATEMENT
Hx of heart transplant 2/2 CHF, presenting with 2 days of chest pain, at its worst this morning approx 4 hrs pta, felt like a stabbing / throbbing sensation from chest to back, no SOB, no nausea/vomiting, no diaphoresis, +subjective warmth, no headache, no numbness/weakness. Is on "a lot of medications" cannot recall which ones for transplant.

## 2020-04-29 NOTE — ED PROVIDER NOTE - PSH
AICD (Automatic Cardioverter/Defibrillator) Present  [employer] with 1 [employer] lead4/1/09- explanted and replaced with [employer] 2 leads on 9/2/09  H/O heart transplant  2/2018  History of Prior Ablation Treatment  for afib  S/P right heart catheterization  biopsy multiple  Status post left hip replacement

## 2020-04-29 NOTE — ED ADULT NURSE NOTE - NSIMPLEMENTINTERV_GEN_ALL_ED
Implemented All Fall with Harm Risk Interventions:  [place] to call system. Call bell, personal items and telephone within reach. Instruct patient to call for assistance. Room bathroom lighting operational. Non-slip footwear when patient is off stretcher. Physically safe environment: no spills, clutter or unnecessary equipment. Stretcher in lowest position, wheels locked, appropriate side rails in place. Provide visual cue, wrist band, yellow gown, etc. Monitor gait and stability. Monitor for mental status changes and reorient to person, place, and time. Review medications for side effects contributing to fall risk. Reinforce activity limits and safety measures with patient and family. Provide visual clues: red socks.

## 2020-04-29 NOTE — CONSULT NOTE ADULT - SUBJECTIVE AND OBJECTIVE BOX
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 Per patient, has had chest pain intermittently since surgery but improves with tylenol prn (takes 1x/week) but this was more severe so contacted transplant coordinator. Also had checked temp at home and found to be 95. Denies any sick contacts, fevers/chills/NS/diarrhea. Due to concern of coronavirus infection was instructed to come to ER which he did via EMS. On arrival, VS were 98, , /94, 99% RA. Initial labs revealed leukopenia (with low lymphocyte), Hb 8.1 (lower than prior), Tbili 2.6 (prev 1.3 2/12). Reports pain is gone now. Abdom US showed gallstone and sludge, without wall edema.      PAST MEDICAL & SURGICAL HISTORY:  Knee pain, right  HLD (hyperlipidemia)  Former smoker  DVT of upper extremity (deep vein thrombosis)  Hepatitis C virus  GIB (gastrointestinal bleeding)  Ventricular fibrillation: s/p AICD  PAF (paroxysmal atrial fibrillation): on xarelto  Non-Ischemic Cardiomyopathy  SVT (Supraventricular Tachycardia)  HTN  CHF (Congestive Heart Failure)  S/P right heart catheterization: biopsy multiple  H/O heart transplant: 2/2018  Status post left hip replacement  History of Prior Ablation Treatment: for afib  AICD (Automatic Cardioverter/Defibrillator) Present: [employer] with 1 [employer] lead4/1/09- explanted and replaced with [employer] 2 leads on 9/2/09      FAMILY HISTORY:  No pertinent family history in first degree relatives      Home Medications:  aspirin 81 mg oral delayed release tablet: 1 tab(s) orally once a day (20 Nov 2019 08:59)  Calcium+D 315-200 MG-Unit: 2 tab(s) orally 2 times a day (20 Nov 2019 08:59)  docusate potassium 100 mg oral capsule: 2 tab(s) orally once a day (at bedtime) (20 Nov 2019 08:59)  fludrocortisone 0.1 mg oral tablet: 1 tab(s) orally once a day (20 Nov 2019 08:59)  magnesium oxide 400 mg (241.3 mg elemental magnesium) oral tablet: 1 tab(s) orally once a day (20 Nov 2019 08:59)  mycophenolate mofetil 250 mg oral capsule: 2 cap(s) orally 2 times a day (20 Nov 2019 09:00)  pravastatin 20 mg oral tablet: 1 tab(s) orally once a day (at bedtime) (20 Nov 2019 08:59)  sodium bicarbonate 650 mg oral tablet: 1 tab(s) orally 2 times a day (20 Nov 2019 08:59)  tacrolimus 1 mg oral capsule: 5 cap(s) orally 2 times a day (20 Nov 2019 08:59)      Medications:  aspirin enteric coated 81 milliGRAM(s) Oral daily  magnesium oxide 400 milliGRAM(s) Oral daily  mycophenolate mofetil 500 milliGRAM(s) Oral two times a day  pravastatin 20 milliGRAM(s) Oral at bedtime  sodium bicarbonate 650 milliGRAM(s) Oral two times a day  sodium chloride 0.9% lock flush 3 milliLiter(s) IV Push every 8 hours  tacrolimus 5 milliGRAM(s) Oral <User Schedule>      Review of systems:  10 point review of systems completed and are negative unless noted in HPI    Vitals:  T(C): 36.8 (04-29-20 @ 19:48), Max: 36.9 (04-29-20 @ 17:51)  HR: 114 (04-29-20 @ 19:48) (104 - 114)  BP: 115/83 (04-29-20 @ 19:48) (115/83 - 134/83)  BP(mean): --  RR: 18 (04-29-20 @ 19:48) (12 - 18)  SpO2: 99% (04-29-20 @ 19:48) (99% - 99%)    Daily Height in cm: 172.72 (29 Apr 2020 13:59)    Daily     Weight (kg): 73.5 (04-29 @ 13:59)    I&O's Summary      Physical Exam:  Appearance: No Acute Distress  HEENT: PERRL  Neck: JVP <6cm  Cardiovascular: Normal S1 S2, No murmurs/rubs/gallops  Respiratory: Clear to auscultation bilaterally  Gastrointestinal: Soft, Non-tender; no RUQ tenderness	  Skin: No cyanosis	  Neurologic: Non-focal  Extremities: No LE edema  Psychiatry: A & O x 3, Mood & affect appropriate    Labs:                        8.1    3.59  )-----------( 241      ( 29 Apr 2020 14:26 )             26.0     04-29    138  |  104  |  35<H>  ----------------------------<  84  5.9<H>   |  22  |  1.53<H>    Ca    9.1      29 Apr 2020 17:52  Phos  4.1     04-29  Mg     2.0     04-29    TPro  8.0  /  Alb  4.1  /  TBili  2.6<H>  /  DBili  x   /  AST  24  /  ALT  7<L>  /  AlkPhos  79  04-29    PT/INR - ( 29 Apr 2020 16:16 )   PT: 13.3 sec;   INR: 1.16 ratio         PTT - ( 29 Apr 2020 16:16 )  PTT:30.6 sec  CARDIAC MARKERS ( 29 Apr 2020 14:26 )  x     / x     / 115 U/L / x     / 2.0 ng/mL

## 2020-04-29 NOTE — ED ADULT NURSE NOTE - PSH
AICD (Automatic Cardioverter/Defibrillator) Present  [employer] with 1 [employer] lead4/1/09- explanted and replaced with [employer] 2 leads on 9/2/09  H/O heart transplant  2/2018  History of Prior Ablation Treatment  for afib  S/P right heart catheterization  biopsy multiple  Status post left hip replacement

## 2020-04-29 NOTE — CONSULT NOTE ADULT - ASSESSMENT
70 y/o M w/ h/o NICM s/p HM2 s/p OHT 18 with coronary fistula, HCV + s/p Rx, prior b/l subclavian DVT s/p Rx, prior [facility]R s/p IVIG/plasmapharesis/Rituximab, CKD (b/l Cr 1.4) who presents with pain across from chest for past day which was increasingly severe along with abdominal tightness. Brought in for concern of Covid which returned negative. Labs notable for lower Hb, and elevated TBili which is likely 2/2 cholelithiasis. Recent RHC  nl hemodynamics and negative for rejection.  stress TTE  also negative for ischemia with normal graft function. Will admit to monitor for additional day.   - trend LFTS  - trend H/H; would do iron studies, folate, B12  - fractionate Tbili; would r/o hemolysis given rising Bili and dropping H/H (unlikely)  - continue home meds; check tac level in AM 30 min prior to tacro dose

## 2020-04-30 DIAGNOSIS — R07.9 CHEST PAIN, UNSPECIFIED: ICD-10-CM

## 2020-04-30 DIAGNOSIS — Z94.1 HEART TRANSPLANT STATUS: ICD-10-CM

## 2020-04-30 DIAGNOSIS — K80.20 CALCULUS OF GALLBLADDER WITHOUT CHOLECYSTITIS WITHOUT OBSTRUCTION: ICD-10-CM

## 2020-04-30 DIAGNOSIS — R10.9 UNSPECIFIED ABDOMINAL PAIN: ICD-10-CM

## 2020-04-30 LAB
24R-OH-CALCIDIOL SERPL-MCNC: 33.5 NG/ML — SIGNIFICANT CHANGE UP (ref 30–80)
ALBUMIN SERPL ELPH-MCNC: 4.1 G/DL — SIGNIFICANT CHANGE UP (ref 3.3–5)
ALP SERPL-CCNC: 75 U/L — SIGNIFICANT CHANGE UP (ref 40–120)
ALT FLD-CCNC: 7 U/L — LOW (ref 10–45)
AMYLASE P1 CFR SERPL: 146 U/L — HIGH (ref 25–125)
ANION GAP SERPL CALC-SCNC: 11 MMOL/L — SIGNIFICANT CHANGE UP (ref 5–17)
AST SERPL-CCNC: 23 U/L — SIGNIFICANT CHANGE UP (ref 10–40)
BILIRUB DIRECT SERPL-MCNC: 0.3 MG/DL — HIGH (ref 0–0.2)
BILIRUB INDIRECT FLD-MCNC: 2.3 MG/DL — HIGH (ref 0.2–1)
BILIRUB SERPL-MCNC: 2.6 MG/DL — HIGH (ref 0.2–1.2)
BILIRUB SERPL-MCNC: 2.6 MG/DL — HIGH (ref 0.2–1.2)
BUN SERPL-MCNC: 32 MG/DL — HIGH (ref 7–23)
CALCIUM SERPL-MCNC: 9.1 MG/DL — SIGNIFICANT CHANGE UP (ref 8.4–10.5)
CHLORIDE SERPL-SCNC: 103 MMOL/L — SIGNIFICANT CHANGE UP (ref 96–108)
CO2 SERPL-SCNC: 22 MMOL/L — SIGNIFICANT CHANGE UP (ref 22–31)
CREAT SERPL-MCNC: 1.53 MG/DL — HIGH (ref 0.5–1.3)
D DIMER BLD IA.RAPID-MCNC: 512 NG/ML DDU — HIGH
FERRITIN SERPL-MCNC: 313 NG/ML — SIGNIFICANT CHANGE UP (ref 30–400)
FIBRINOGEN PPP-MCNC: 443 MG/DL — SIGNIFICANT CHANGE UP (ref 350–510)
FOLATE SERPL-MCNC: 8 NG/ML — SIGNIFICANT CHANGE UP
GLUCOSE SERPL-MCNC: 96 MG/DL — SIGNIFICANT CHANGE UP (ref 70–99)
HCT VFR BLD CALC: 22.6 % — LOW (ref 39–50)
HCT VFR BLD CALC: 22.8 % — LOW (ref 39–50)
HGB BLD-MCNC: 7 G/DL — CRITICAL LOW (ref 13–17)
HGB BLD-MCNC: 7.1 G/DL — LOW (ref 13–17)
IRON SATN MFR SERPL: 116 UG/DL — SIGNIFICANT CHANGE UP (ref 45–165)
IRON SATN MFR SERPL: 37 % — SIGNIFICANT CHANGE UP (ref 16–55)
LDH SERPL L TO P-CCNC: 326 U/L — HIGH (ref 50–242)
LDH SERPL L TO P-CCNC: 361 U/L — HIGH (ref 50–242)
LIDOCAIN IGE QN: 22 U/L — SIGNIFICANT CHANGE UP (ref 7–60)
MCHC RBC-ENTMCNC: 29.8 PG — SIGNIFICANT CHANGE UP (ref 27–34)
MCHC RBC-ENTMCNC: 30.1 PG — SIGNIFICANT CHANGE UP (ref 27–34)
MCHC RBC-ENTMCNC: 31 GM/DL — LOW (ref 32–36)
MCHC RBC-ENTMCNC: 31.1 GM/DL — LOW (ref 32–36)
MCV RBC AUTO: 96.2 FL — SIGNIFICANT CHANGE UP (ref 80–100)
MCV RBC AUTO: 96.6 FL — SIGNIFICANT CHANGE UP (ref 80–100)
NRBC # BLD: 0 /100 WBCS — SIGNIFICANT CHANGE UP (ref 0–0)
NRBC # BLD: 0 /100 WBCS — SIGNIFICANT CHANGE UP (ref 0–0)
PLATELET # BLD AUTO: 209 K/UL — SIGNIFICANT CHANGE UP (ref 150–400)
PLATELET # BLD AUTO: 215 K/UL — SIGNIFICANT CHANGE UP (ref 150–400)
POTASSIUM SERPL-MCNC: 5.6 MMOL/L — HIGH (ref 3.5–5.3)
POTASSIUM SERPL-SCNC: 5.6 MMOL/L — HIGH (ref 3.5–5.3)
PROT SERPL-MCNC: 8.2 G/DL — SIGNIFICANT CHANGE UP (ref 6–8.3)
RBC # BLD: 2.35 M/UL — LOW (ref 4.2–5.8)
RBC # BLD: 2.36 M/UL — LOW (ref 4.2–5.8)
RBC # BLD: 2.36 M/UL — LOW (ref 4.2–5.8)
RBC # FLD: 15.5 % — HIGH (ref 10.3–14.5)
RBC # FLD: 15.5 % — HIGH (ref 10.3–14.5)
RETICS #: 67.7 K/UL — SIGNIFICANT CHANGE UP (ref 25–125)
RETICS/RBC NFR: 2.9 % — HIGH (ref 0.5–2.5)
SARS-COV-2 RNA SPEC QL NAA+PROBE: SIGNIFICANT CHANGE UP
SODIUM SERPL-SCNC: 136 MMOL/L — SIGNIFICANT CHANGE UP (ref 135–145)
TACROLIMUS SERPL-MCNC: 9.3 NG/ML — SIGNIFICANT CHANGE UP
TIBC SERPL-MCNC: 316 UG/DL — SIGNIFICANT CHANGE UP (ref 220–430)
UIBC SERPL-MCNC: 200 UG/DL — SIGNIFICANT CHANGE UP (ref 110–370)
VIT B12 SERPL-MCNC: 702 PG/ML — SIGNIFICANT CHANGE UP (ref 232–1245)
WBC # BLD: 4.04 K/UL — SIGNIFICANT CHANGE UP (ref 3.8–10.5)
WBC # BLD: 4.12 K/UL — SIGNIFICANT CHANGE UP (ref 3.8–10.5)
WBC # FLD AUTO: 4.04 K/UL — SIGNIFICANT CHANGE UP (ref 3.8–10.5)
WBC # FLD AUTO: 4.12 K/UL — SIGNIFICANT CHANGE UP (ref 3.8–10.5)

## 2020-04-30 PROCEDURE — 76700 US EXAM ABDOM COMPLETE: CPT | Mod: 26

## 2020-04-30 PROCEDURE — 74177 CT ABD & PELVIS W/CONTRAST: CPT | Mod: 26

## 2020-04-30 PROCEDURE — 99233 SBSQ HOSP IP/OBS HIGH 50: CPT

## 2020-04-30 PROCEDURE — 93308 TTE F-UP OR LMTD: CPT | Mod: 26

## 2020-04-30 PROCEDURE — 99222 1ST HOSP IP/OBS MODERATE 55: CPT

## 2020-04-30 PROCEDURE — 99223 1ST HOSP IP/OBS HIGH 75: CPT

## 2020-04-30 RX ORDER — FLUDROCORTISONE ACETATE 0.1 MG/1
1 TABLET ORAL
Qty: 0 | Refills: 0 | DISCHARGE

## 2020-04-30 RX ORDER — SODIUM CHLORIDE 9 MG/ML
1000 INJECTION INTRAMUSCULAR; INTRAVENOUS; SUBCUTANEOUS
Refills: 0 | Status: COMPLETED | OUTPATIENT
Start: 2020-04-30 | End: 2020-04-30

## 2020-04-30 RX ORDER — SODIUM CHLORIDE 9 MG/ML
1000 INJECTION INTRAMUSCULAR; INTRAVENOUS; SUBCUTANEOUS
Refills: 0 | Status: DISCONTINUED | OUTPATIENT
Start: 2020-04-30 | End: 2020-05-07

## 2020-04-30 RX ORDER — ACETAMINOPHEN 500 MG
650 TABLET ORAL EVERY 6 HOURS
Refills: 0 | Status: DISCONTINUED | OUTPATIENT
Start: 2020-04-30 | End: 2020-05-07

## 2020-04-30 RX ADMIN — Medication 20 MILLIGRAM(S): at 21:01

## 2020-04-30 RX ADMIN — SODIUM CHLORIDE 75 MILLILITER(S): 9 INJECTION INTRAMUSCULAR; INTRAVENOUS; SUBCUTANEOUS at 12:50

## 2020-04-30 RX ADMIN — Medication 81 MILLIGRAM(S): at 09:38

## 2020-04-30 RX ADMIN — SODIUM CHLORIDE 3 MILLILITER(S): 9 INJECTION INTRAMUSCULAR; INTRAVENOUS; SUBCUTANEOUS at 05:49

## 2020-04-30 RX ADMIN — Medication 650 MILLIGRAM(S): at 13:00

## 2020-04-30 RX ADMIN — TACROLIMUS 5 MILLIGRAM(S): 5 CAPSULE ORAL at 07:55

## 2020-04-30 RX ADMIN — SODIUM CHLORIDE 75 MILLILITER(S): 9 INJECTION INTRAMUSCULAR; INTRAVENOUS; SUBCUTANEOUS at 20:16

## 2020-04-30 RX ADMIN — TACROLIMUS 5 MILLIGRAM(S): 5 CAPSULE ORAL at 20:15

## 2020-04-30 RX ADMIN — MYCOPHENOLATE MOFETIL 500 MILLIGRAM(S): 250 CAPSULE ORAL at 07:55

## 2020-04-30 RX ADMIN — Medication 650 MILLIGRAM(S): at 05:50

## 2020-04-30 RX ADMIN — MAGNESIUM OXIDE 400 MG ORAL TABLET 400 MILLIGRAM(S): 241.3 TABLET ORAL at 09:39

## 2020-04-30 RX ADMIN — SODIUM CHLORIDE 3 MILLILITER(S): 9 INJECTION INTRAMUSCULAR; INTRAVENOUS; SUBCUTANEOUS at 21:00

## 2020-04-30 RX ADMIN — Medication 650 MILLIGRAM(S): at 20:15

## 2020-04-30 RX ADMIN — MYCOPHENOLATE MOFETIL 500 MILLIGRAM(S): 250 CAPSULE ORAL at 20:15

## 2020-04-30 RX ADMIN — SODIUM CHLORIDE 3 MILLILITER(S): 9 INJECTION INTRAMUSCULAR; INTRAVENOUS; SUBCUTANEOUS at 12:50

## 2020-04-30 NOTE — CONSULT NOTE ADULT - SUBJECTIVE AND OBJECTIVE BOX
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 Per patient, has had chest pain intermittently since surgery but improves with tylenol prn (takes 1x/week) but this was more severe so contacted transplant coordinator. Also had checked temp at home and found to be 95. Denies any sick contacts, fevers/chills/NS/diarrhea. Due to concern of coronavirus infection was instructed to come to ER which he did via EMS. On arrival, VS were 98, , /94, 99% RA. Initial labs revealed leukopenia (with low lymphocyte), Hb 8.1 (lower than prior), Tbili 2.6 (prev 1.3 2/12). Reports pain is gone now. Abd US showed gallstone and sludge, without wall edema. (30 Apr 2020 00:10)      PAST MEDICAL & SURGICAL HISTORY:  Knee pain, right  HLD (hyperlipidemia)  Former smoker  DVT of upper extremity (deep vein thrombosis)  Hepatitis C virus  GIB (gastrointestinal bleeding)  Ventricular fibrillation: s/p AICD  PAF (paroxysmal atrial fibrillation): on xarelto  Non-Ischemic Cardiomyopathy  SVT (Supraventricular Tachycardia)  HTN  CHF (Congestive Heart Failure)  S/P right heart catheterization: biopsy multiple  H/O heart transplant: 2/2018  Status post left hip replacement  History of Prior Ablation Treatment: for afib  AICD (Automatic Cardioverter/Defibrillator) Present: [employer] with 1 [employer] lead4/1/09- explanted and replaced with [employer] 2 leads on 9/2/09      SOCIAL:    FAMILY HISTORY:  No pertinent family history in first degree relatives    REVIEW OF SYSTEMS  General:	Denies any malaise fatigue or chills. Fevers absent, notes abdominal and shoulder pain    Skin:No rash  	  Ophthalmologic:Denies any visual complaints,discharge redness or photophobia  	  ENMT:No nasal discharge,headache,sinus congestion or throat pain.No dental complaints    Respiratory and Thorax:No cough,sputum or chest pain.Denies shortness of breath  	  Cardiovascular:	No chest pain,palpitaions or dizziness    Gastrointestinal:	NO nausea, reports abdominal pain, no diarrhea    Genitourinary:	No dysuria,frequency. No flank pain    Musculoskeletal:	No joint swelling or pain.No weakness c/o shoulder pains bilateral    Neurological:No confusion,diziness.No extremity weakness.No bladder or bowel incontinence	    Psychiatric:No delusions or hallucinations	    Hematology/Lymphatics:	No LN swelling.No gum bleeding     Endocrine:	No recent weight gain or loss.No abnormal heat/cold intolerance    Allergic/Immunologic:	No hives or rash   Allergies    No Known Allergies    Intolerances        ANTIMICROBIALS:        Vital Signs Last 24 Hrs  T(C): 36.7 (30 Apr 2020 12:20), Max: 36.9 (29 Apr 2020 17:51)  T(F): 98 (30 Apr 2020 12:20), Max: 98.4 (29 Apr 2020 17:51)  HR: 113 (30 Apr 2020 12:20) (106 - 122)  BP: 118/74 (30 Apr 2020 12:20) (109/84 - 132/85)  BP(mean): --  RR: 18 (30 Apr 2020 12:20) (18 - 19)  SpO2: 99% (30 Apr 2020 12:20) (99% - 99%)    PHYSICAL EXAM:Pleasant patient in no acute distress.      Constitutional:Comfortable.Awake and alert  No cachexia     Eyes:PERRL EOMI.NO discharge or conjunctival injection    ENMT:No sinus tenderness.No thrush.No pharyngeal exudate or erythema. edentulous    Neck:Supple,No LN,no JVD      Respiratory:Good air entry bilaterally,CTA    Cardiovascular:S1 S2 wnl, No murmurs,rub or gallops  sternotomy wound healed    Gastrointestinal:Soft BS(+) no tenderness no masses ,No rebound or guarding  surgical scars healed    Genitourinary:No CVA tendereness     Rectal:    Extremities:No cyanosis,clubbing or edema.  middle digit with changes post partial distal amputation    Vascular:peripheral pulses felt    Neurological:AAO X 3,No grossly focal deficits    Skin:No rash     Lymph Nodes:No palpable LNs    Musculoskeletal:No joint swelling or LOM    Psychiatric:Affect normal.                              7.0    4.12  )-----------( 215      ( 30 Apr 2020 14:19 )             22.6       04-30    136  |  103  |  32<H>  ----------------------------<  96  5.6<H>   |  22  |  1.53<H>    Ca    9.1      30 Apr 2020 07:44  Phos  4.1     04-29  Mg     2.0     04-29    TPro  8.2  /  Alb  4.1  /  TBili  2.6<H>  /  DBili  0.3<H>  /  AST  23  /  ALT  7<L>  /  AlkPhos  75  04-30      RECENT CULTURES:  COVID-19 PCR: NotDetec: This test has been validated by [employer] to be accurate;  though it has not been FDA cleared/approved by the usual pathway  As with all laboratory test, results should be correlated with clinical  findings.  https://www.fda.gov/media/679634/download  https://www.fda.gov/media/387883/download (04.30.20 @ 09:47)    CMVPCR Log: Det <1.70 Assay lower limit of detection (LOD):  31.2 IU/mL (1.49 log10/mL)  Assay dynamic range:  50 to 156,000,000 (156M) CMV DNA IU/mL (1.70 log10/mL – 8.19 log10/mL)  Plasma CMV DNA Quantification by PCR using Abbott m2000:  The results of this test should be interpreted with consideration of all  clinical and laboratory findings. In particular, caution should be used  when interpreting low level positive results when the test is used for  diagnostic purposes. Repeat testing on an additional sample is  recommended to confirm low level positive results. A result of "Not  Detected" does not preclude the possibility of infection with CMV.  CMV  DNA may be present below the detection limit of assay. Npo73VT/mL (02.12.20 @ 10:21)        RADIOLOGY:    < from: US Abdomen Complete (04.30.20 @ 08:42) >  IMPRESSION:     Fatty infiltrationof the liver.    Gallstones.    Left renal cyst.    < end of copied text >  < from: Xray Chest 1 View- PORTABLE-Urgent (04.29.20 @ 15:10) >  Impression:  1.  No evidence of parenchymal infiltrate. Fibrotic or atelectatic changes at the right lung base are stable.    < end of copied text >    IMPRESSION:    Rx:

## 2020-04-30 NOTE — CONSULT NOTE ADULT - SUBJECTIVE AND OBJECTIVE BOX
Chief Complaint:  Patient is a 70y old  Male who presents with a chief complaint of abdominal pain (2020 15:14)    HPI:  70 year old male with a hx of non-ischemic cardiomyopathy s/p heart transplant, HCV s/p treatment, CKD and history of small bowel angiectasias presents with chest pain associated with abdominal pain. No recent complaints of fevers, chills, chest pain, shortness of breath, nausea, vomiting, diarrhea, melena, hematochezia, hematemesis, cough and dizziness. In the ED he was found to have gallstones on US - GI consulted for evaluation of abdominal pain.     Allergies:  No Known Allergies    Home Medications:  Reviewed    Hospital Medications:  acetaminophen   Tablet .. 650 milliGRAM(s) Oral every 6 hours PRN  aspirin enteric coated 81 milliGRAM(s) Oral daily  magnesium oxide 400 milliGRAM(s) Oral daily  mycophenolate mofetil 500 milliGRAM(s) Oral two times a day  pravastatin 20 milliGRAM(s) Oral at bedtime  sodium bicarbonate 650 milliGRAM(s) Oral two times a day  sodium chloride 0.9% lock flush 3 milliLiter(s) IV Push every 8 hours  sodium chloride 0.9%. 1000 milliLiter(s) IV Continuous <Continuous>  sodium chloride 0.9%. 1000 milliLiter(s) IV Continuous <Continuous>  tacrolimus 5 milliGRAM(s) Oral <User Schedule>    PMHX/PSHX:  Knee pain, right  HLD (hyperlipidemia)  Former smoker  DVT of upper extremity (deep vein thrombosis)  Hepatitis C virus  GIB (gastrointestinal bleeding)  H/O prior ablation treatment  Ventricular fibrillation  PAF (paroxysmal atrial fibrillation)  Non-Ischemic Cardiomyopathy  SVT (Supraventricular Tachycardia)  HTN  CHF (Congestive Heart Failure)  S/P right heart catheterization  H/O heart transplant  LVAD (left ventricular assist device) present  Status post left hip replacement  Hypertension  Hypertension  History of Prior Ablation Treatment  AICD (Automatic Cardioverter/Defibrillator) Present    Family history:  No pertinent family history in first degree relatives    Social History:   Denies tobacco use, EtOH use or illicit drug use     ROS:   General:  No fevers, chills   ENT:  No sore throat or dysphagia  CV:  No pain or palpitations  Resp:  No dyspnea, cough, wheezing  GI:  No pain, No nausea, No vomiting,  No rectal bleeding, No tarry stools  Skin:  No rash or edema      PHYSICAL EXAM:   GENERAL:  NAD, Appears stated age  HEENT:  NC/AT,  conjunctivae clear and pink, sclera -anicteric  CHEST:  CTA B/L, Normal effort  HEART:  RRR S1/S2, No murmurs  ABDOMEN:  Soft, non-tender, non-distended, BS+  EXTEREMITIES:  No cyanosis or Edema  SKIN:  Warm & Dry.   NEURO:  Alert, oriented    Vital Signs:  Vital Signs Last 24 Hrs  T(C): 36.7 (2020 12:20), Max: 36.9 (2020 17:51)  T(F): 98 (2020 12:20), Max: 98.4 (2020 17:51)  HR: 113 (2020 12:20) (106 - 122)  BP: 118/74 (2020 12:20) (109/84 - 132/85)  BP(mean): --  RR: 18 (2020 12:20) (18 - 19)  SpO2: 99% (2020 12:20) (99% - 99%)  Daily Height in cm: 172.72 (2020 23:00)    Daily Weight in k.1 (2020 07:55)    LABS:                        7.0    4.12  )-----------( 215      ( 2020 14:19 )             22.6     Mean Cell Volume: 96.2 fl (20 @ 14:19)        136  |  103  |  32<H>  ----------------------------<  96  5.6<H>   |  22  |  1.53<H>    Ca    9.1      2020 07:44  Phos  4.1       Mg     2.0         TPro  8.2  /  Alb  4.1  /  TBili  2.6<H>  /  DBili  0.3<H>  /  AST  23  /  ALT  7<L>  /  AlkPhos  75  30    LIVER FUNCTIONS - ( 2020 07:44 )  Alb: 4.1 g/dL / Pro: 8.2 g/dL / ALK PHOS: 75 U/L / ALT: 7 U/L / AST: 23 U/L / GGT: x           PT/INR - ( 2020 16:16 )   PT: 13.3 sec;   INR: 1.16 ratio         PTT - ( 2020 16:16 )  PTT:30.6 sec    Amylase [id number]      Lipase serum22       Ammonia--                          7.0    4.12  )-----------( 215      ( 2020 14:19 )             22.6                         7.1    4.04  )-----------( 209      ( 2020 07:44 )             22.8                         8.1    3.59  )-----------( 241      ( 2020 14:26 )             26.0     Imaging: Chief Complaint:  Patient is a 70y old  Male who presents with a chief complaint of abdominal pain (2020 15:14)    HPI:  70 year old male with a hx of non-ischemic cardiomyopathy s/p heart transplant, HCV s/p treatment, CKD and history of small bowel angiectasias presents with chest pain associated with abdominal pain. Patient reports he has had tightness in his chest and across his mid-abdomen since he had his heart transplant. This pain is usually worsened with walking and not related to food intake, position or bowel movements. He does not currently have pain at this time. No recent complaints of weight loss, fevers, chills, chest pain, shortness of breath, nausea, vomiting, diarrhea, melena, hematochezia, hematemesis, cough and dizziness. In the ED he was found to have gallstones on US - GI consulted for evaluation of abdominal pain.     Allergies:  No Known Allergies    Home Medications:  Reviewed    Hospital Medications:  acetaminophen   Tablet .. 650 milliGRAM(s) Oral every 6 hours PRN  aspirin enteric coated 81 milliGRAM(s) Oral daily  magnesium oxide 400 milliGRAM(s) Oral daily  mycophenolate mofetil 500 milliGRAM(s) Oral two times a day  pravastatin 20 milliGRAM(s) Oral at bedtime  sodium bicarbonate 650 milliGRAM(s) Oral two times a day  sodium chloride 0.9% lock flush 3 milliLiter(s) IV Push every 8 hours  sodium chloride 0.9%. 1000 milliLiter(s) IV Continuous <Continuous>  sodium chloride 0.9%. 1000 milliLiter(s) IV Continuous <Continuous>  tacrolimus 5 milliGRAM(s) Oral <User Schedule>    PMHX/PSHX:  Knee pain, right  HLD (hyperlipidemia)  Former smoker  DVT of upper extremity (deep vein thrombosis)  Hepatitis C virus  GIB (gastrointestinal bleeding)  H/O prior ablation treatment  Ventricular fibrillation  PAF (paroxysmal atrial fibrillation)  Non-Ischemic Cardiomyopathy  SVT (Supraventricular Tachycardia)  HTN  CHF (Congestive Heart Failure)  S/P right heart catheterization  H/O heart transplant  LVAD (left ventricular assist device) present  Status post left hip replacement  Hypertension  Hypertension  History of Prior Ablation Treatment  AICD (Automatic Cardioverter/Defibrillator) Present    Family history:  No pertinent family history in first degree relatives    Social History:   Denies tobacco use, EtOH use or illicit drug use     ROS:   General:  No fevers, chills   ENT:  No sore throat or dysphagia  CV:  No pain or palpitations  Resp:  No dyspnea, cough, wheezing  GI:  No pain, No nausea, No vomiting,  No rectal bleeding, No tarry stools  Skin:  No rash or edema    PHYSICAL EXAM:   GENERAL:  NAD, Appears stated age  HEENT:  NC/AT,  conjunctivae clear and pink, sclera -anicteric  CHEST:  CTA B/L, Normal effort  HEART:  RRR S1/S2, No murmurs  ABDOMEN:  Soft, non-tender, non-distended, BS+  EXTEREMITIES:  No cyanosis or Edema  SKIN:  Warm & Dry.   NEURO:  Alert, oriented    Vital Signs:  Vital Signs Last 24 Hrs  T(C): 36.7 (2020 12:20), Max: 36.9 (2020 17:51)  T(F): 98 (2020 12:20), Max: 98.4 (2020 17:51)  HR: 113 (2020 12:20) (106 - 122)  BP: 118/74 (2020 12:20) (109/84 - 132/85)  BP(mean): --  RR: 18 (2020 12:20) (18 - 19)  SpO2: 99% (2020 12:20) (99% - 99%)  Daily Height in cm: 172.72 (2020 23:00)    Daily Weight in k.1 (2020 07:55)    LABS:                        7.0    4.12  )-----------( 215      ( 2020 14:19 )             22.6     Mean Cell Volume: 96.2 fl (-20 @ 14:19)        136  |  103  |  32<H>  ----------------------------<  96  5.6<H>   |  22  |  1.53<H>    Ca    9.1      2020 07:44  Phos  4.1       Mg     2.0         TPro  8.2  /  Alb  4.1  /  TBili  2.6<H>  /  DBili  0.3<H>  /  AST  23  /  ALT  7<L>  /  AlkPhos  75  30    LIVER FUNCTIONS - ( 2020 07:44 )  Alb: 4.1 g/dL / Pro: 8.2 g/dL / ALK PHOS: 75 U/L / ALT: 7 U/L / AST: 23 U/L / GGT: x           PT/INR - ( 2020 16:16 )   PT: 13.3 sec;   INR: 1.16 ratio       PTT - ( 2020 16:16 )  PTT:30.6 sec    Amylase [id number]      Lipase serum22                         7.0    4.12  )-----------( 215      ( 2020 14:19 )             22.6                         7.1    4.04  )-----------( 209      ( 2020 07:44 )             22.8                         8.1    3.59  )-----------( 241      ( 2020 14:26 )             26.0     Imaging:

## 2020-04-30 NOTE — CONSULT NOTE ADULT - ASSESSMENT
70 year old male with a hx of non-ischemic cardiomyopathy s/p heart transplant, HCV s/p treatment, CKD and history of small bowel angiectasias presents with chest pain associated with abdominal pain.     Impression:  # Abdominal Pain  # Normocytic Anemia: No overt signs of bleeding; patient with hx of small bowel angiectiasias.   # NICM s/p transplant  # HCV s/p treatment  # CKD    Recommendation:  - Follow up CT as ordered  - Supportive care per primary team    Mitzi King MD  Gastroenterology Fellow  [phone]  Please page on call fellow on weekends and after 5pm on weekdays 70 year old male with a hx of non-ischemic cardiomyopathy s/p heart transplant, HCV s/p treatment, CKD and history of small bowel angiectasias presents with chest pain associated with abdominal pain.     Impression:  # Abdominal Pain: Pain not clearly consistent with GI pathology [not related to food/BMs, worsened with exertion].  # Normocytic Anemia: No overt signs of bleeding; patient with hx of small bowel angiectiasias. Has elevated indirect bilirubin; concerning for possible hemolysis   # NICM s/p transplant  # HCV s/p treatment  # CKD    Recommendation:  - Follow up CT as ordered  - Check LDH  - Transfuse to Hb > 7.0  - Supportive care per primary team    Mitzi King MD  Gastroenterology Fellow  [phone]  Please page on call fellow on weekends and after 5pm on weekdays

## 2020-04-30 NOTE — H&P ADULT - HISTORY OF PRESENT ILLNESS
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 Per patient, has had chest pain intermittently since surgery but improves with tylenol prn (takes 1x/week) but this was more severe so contacted transplant coordinator. Also had checked temp at home and found to be 95. Denies any sick contacts, fevers/chills/NS/diarrhea. Due to concern of coronavirus infection was instructed to come to ER which he did via EMS. On arrival, VS were 98, , /94, 99% RA. Initial labs revealed leukopenia (with low lymphocyte), Hb 8.1 (lower than prior), Tbili 2.6 (prev 1.3 2/12). Reports pain is gone now. Abd US showed gallstone and sludge, without wall edema.

## 2020-04-30 NOTE — H&P ADULT - ASSESSMENT
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 Brought in for concern of Covid which returned negative. Labs notable for lower Hb, and elevated TBili which is likely 2/2 cholelithiasis.

## 2020-04-30 NOTE — PROGRESS NOTE ADULT - SUBJECTIVE AND OBJECTIVE BOX
Interval History:  denies any pain today  denies any BRBPR or melena  H/H noted to drop     Medications:  acetaminophen   Tablet .. 650 milliGRAM(s) Oral every 6 hours PRN  aspirin enteric coated 81 milliGRAM(s) Oral daily  magnesium oxide 400 milliGRAM(s) Oral daily  mycophenolate mofetil 500 milliGRAM(s) Oral two times a day  pravastatin 20 milliGRAM(s) Oral at bedtime  sodium bicarbonate 650 milliGRAM(s) Oral two times a day  sodium chloride 0.9% lock flush 3 milliLiter(s) IV Push every 8 hours  sodium chloride 0.9%. 1000 milliLiter(s) IV Continuous <Continuous>  sodium chloride 0.9%. 1000 milliLiter(s) IV Continuous <Continuous>  tacrolimus 5 milliGRAM(s) Oral <User Schedule>      Vitals:  T(C): 36.7 (20 @ 12:20), Max: 36.8 (20 @ 19:48)  HR: 113 (20 @ 12:20) (110 - 122)  BP: 118/74 (20 @ 12:20) (109/84 - 132/85)  BP(mean): --  RR: 18 (20 @ 12:20) (18 - 19)  SpO2: 99% (20 @ 12:20) (99% - 99%)    Daily Height in cm: 172.72 (2020 23:00)    Daily Weight in k.1 (2020 07:55)    Weight (kg): 74.8 ( @ 23:00)    I&O's Summary    2020 07:01  -  2020 19:37  --------------------------------------------------------  IN: 1295 mL / OUT: 400 mL / NET: 895 mL    Physical Exam:  Appearance: No Acute Distress  HEENT: PERRL  Neck: No JVD  Cardiovascular: Normal S1 S2, No murmurs/rubs/gallops  Respiratory: Clear to auscultation bilaterally  Gastrointestinal: Soft, Non-tender	  Skin: No cyanosis	  Neurologic: Non-focal  Extremities: No LE edema  Psychiatry: A & O x 3, Mood & affect appropriate    Labs:                        7.0    4.12  )-----------( 215      ( 2020 14:19 )             22.6         136  |  103  |  32<H>  ----------------------------<  96  5.6<H>   |  22  |  1.53<H>    Ca    9.1      2020 07:44  Phos  4.1       Mg     2.0         TPro  8.2  /  Alb  4.1  /  TBili  2.6<H>  /  DBili  0.3<H>  /  AST  23  /  ALT  7<L>  /  AlkPhos  75  30    PT/INR - ( 2020 16:16 )   PT: 13.3 sec;   INR: 1.16 ratio         PTT - ( 2020 16:16 )  PTT:30.6 sec  CARDIAC MARKERS ( 2020 14:26 )  x     / x     / 115 U/L / x     / 2.0 ng/mL

## 2020-04-30 NOTE — CONSULT NOTE ADULT - SUBJECTIVE AND OBJECTIVE BOX
GENERAL SURGERY CONSULT NOTE  --------------------------------------------------------------------------------------------    Patient is a 70 year old male with a PMH of a heart transplant, UGI bleed in 2018, hep C, b/l subclavian DVTs, CKD, and AICD in the past who presented to the hospital with abdominal pain.  The pain has been constant for a couple of years and got worse yesterday.  The patient called the transplant center and was asked to come to the ED.  The pain is constant and is not worse with eating.  Walking makes the pain a little worse and the pain is located on the left and right side of the umbilicus, but worse on the left side.  He denies having any nausea, vomiting, fevers or chills.  He is having normal BMs and his last one was yesterday.  In the ED he was found to have gallstones and General surgery was consulted.      ROS: 10-system review is otherwise negative except HPI above.      PAST MEDICAL & SURGICAL HISTORY:  Knee pain, right  HLD (hyperlipidemia)  Former smoker  DVT of upper extremity (deep vein thrombosis)  Hepatitis C virus  GIB (gastrointestinal bleeding)  Ventricular fibrillation: s/p AICD  PAF (paroxysmal atrial fibrillation): on xarelto  Non-Ischemic Cardiomyopathy  SVT (Supraventricular Tachycardia)  HTN  CHF (Congestive Heart Failure)  S/P right heart catheterization: biopsy multiple  H/O heart transplant: 2/2018  Status post left hip replacement  History of Prior Ablation Treatment: for afib  AICD (Automatic Cardioverter/Defibrillator) Present: [employer] with 1 [employer] lead4/1/09- explanted and replaced with [employer] 2 leads on 9/2/09    FAMILY HISTORY:  No pertinent family history in first degree relatives    ALLERGIES: No Known Allergies      CURRENT MEDICATIONS  MEDICATIONS (STANDING): aspirin enteric coated 81 milliGRAM(s) Oral daily  magnesium oxide 400 milliGRAM(s) Oral daily  mycophenolate mofetil 500 milliGRAM(s) Oral two times a day  pravastatin 20 milliGRAM(s) Oral at bedtime  sodium bicarbonate 650 milliGRAM(s) Oral two times a day  sodium chloride 0.9% lock flush 3 milliLiter(s) IV Push every 8 hours  tacrolimus 5 milliGRAM(s) Oral <User Schedule>    MEDICATIONS (PRN):  --------------------------------------------------------------------------------------------    Vitals:   T(C): 36.6 (04-30-20 @ 07:00), Max: 36.9 (04-29-20 @ 17:51)  HR: 110 (04-30-20 @ 07:00) (104 - 122)  BP: 123/87 (04-30-20 @ 07:00) (109/84 - 134/83)  RR: 18 (04-30-20 @ 07:00) (12 - 19)  SpO2: 99% (04-30-20 @ 07:00) (99% - 99%)  CAPILLARY BLOOD GLUCOSE        CAPILLARY BLOOD GLUCOSE          04-30 @ 07:01  -  04-30 @ 11:43  --------------------------------------------------------  IN:    Oral Fluid: 345 mL  Total IN: 345 mL    OUT:  Total OUT: 0 mL    Total NET: 345 mL        Height (cm): 172.7 (04-29 @ 23:00)  Weight (kg): 74.8 (04-29 @ 23:00)  BMI (kg/m2): 25.1 (04-29 @ 23:00)  BSA (m2): 1.88 (04-29 @ 23:00)    PHYSICAL EXAM:  General: NAD, Lying in bed comfortably  Neuro: A+Ox3  HEENT: NC/AT  Cardio: Regular rate  Resp: Good effort, no accessory muscle use  GI/Abd: Soft, Non-distended, no Tenderness to palpation.  There are several 2cm surgical scars across the abdomen in a transverse pattern.  Vascular: All 4 extremities warm.  Musculoskeletal: All 4 extremities moving spontaneously  --------------------------------------------------------------------------------------------    LABS  CBC (04-30 @ 07:44)                              7.1<L>                         4.04    )----------------(  209        --    % Neutrophils, --    % Lymphocytes, ANC: --                                  22.8<L>  CBC (04-29 @ 14:26)                              8.1<L>                         3.59<L>  )----------------(  241        69.8  % Neutrophils, 19.5  % Lymphocytes, ANC: 2.51                                26.0<L>    BMP (04-30 @ 07:44)             136     |  103     |  32<H> 		Ca++ --      Ca 9.1                ---------------------------------( 96    		Mg --                 5.6<H>  |  22      |  1.53<H>			Ph --      BMP (04-29 @ 17:52)             138     |  104     |  35<H> 		Ca++ --      Ca 9.1                ---------------------------------( 84    		Mg --                 5.9<H>  |  22      |  1.53<H>			Ph --        LFTs (04-30 @ 07:44)      TPro 8.2 / Alb 4.1 / TBili 2.6<H> / DBili 0.3<H> / AST 23 / ALT 7<L> / AlkPhos 75  LFTs (04-29 @ 17:52)      TPro 8.0 / Alb 4.1 / TBili 2.6<H> / DBili -- / AST 24 / ALT 7<L> / AlkPhos 79    Coags (04-29 @ 16:16)  aPTT 30.6 / INR 1.16 / PT 13.3<H>    Cardiac Markers (04-29 @ 14:26)     HSTrop: -- / CKMB: -- / CK: 115      VBG (04-29 @ 14:26)     7.33<L> / 48 / 32 / 24 / -0.9 / 52<L>%     Lactate: 1.1    --------------------------------------------------------------------------------------------    MICROBIOLOGY      --------------------------------------------------------------------------------------------    IMAGING  US:  FINDINGS:    Liver: Diffuse fatty infiltration.    Bile ducts: Normal caliber. Common bile duct measures 6 mm.     Gallbladder: Gallstones.        Pancreas: Not visualized due to overlying bowel gas.    Spleen: 11.2 cm. Within normal limits.    Right kidney: 9.8 cm. No hydronephrosis.    Left kidney: 9.8 cm.  No hydronephrosis. There is a 3.7 cm cyst with thin septation.    Ascites: None.    Aorta and IVC: Visualized portions are within normal limits.    IMPRESSION:     Fatty infiltration of the liver.    Gallstones.    Left renal cyst.        ASSESSMENT: Patient is a 70 year old male with a PMH of a heart transplant, UGI bleed in 2018, hep C, b/l subclavian DVTs, CKD, and AICD in the past who presented to the hospital with abdominal pain and found to have cholelithiasis.    PLAN:  - Unclear etiology of patients abdominal pain but unlikely due to gallstones as symptoms would be atypical of this and pain is located on the left side.  Inpatient ultrasound does not suggest cholecystitis, as it showed a normal gallbladder wall and no pericholecystic fluid.  Also, the elevated T.bili is mostly indirect which is also less consistent with choledocholithiasis or biliary tree etiology in nature.  - Would recommend CT A/P with IV and PO contrast to further evaluate patients abdominal pain  - Recommend Stool guaiac test as Hgb decreased, has not received fluid while here, and has a history of an upper GI bleed in the past (seen on endoscopy in 2018)  - Appreciate GI recommendations  - Plan discussed with Attending    Tao Elliott PGY3  Acute Care Surgery #[id number]

## 2020-04-30 NOTE — PROGRESS NOTE ADULT - ASSESSMENT
68 y/o M w/ h/o NICM s/p HM2 s/p OHT 18 with coronary fistula, HCV + s/p Rx, prior b/l subclavian DVT s/p Rx, prior AMR s/p IVIG/plasmapharesis/Rituximab, CKD (b/l Cr 1.4) who presents with pain across from chest for past day which was increasingly severe along with abdominal tightness. Brought in for concern of Covid which returned negative. Abdom US revealed gall stones w/o cholecystitis. Labs notable for dropping Hb, and elevated TBili with indirect component. Recent RHC  nl hemodynamics and negative for rejection.  stress TTE  also negative for ischemia with normal graft function. Unclear etiology of dropping H/H and elevated indirect bilirubin but perhaps related to his coronary fistula which may be causing intravascular hemolysis  - trend H/H; anemia workup was unrevealing; would also check SPEP/UPEP  - check LDH, hapto; evaluate smear for schistocytes  - continue home meds; check tac level in AM 30 min prior to tacro dose  - plan for CT A/P w/ contrast; would hydrate 75/hr x 6 hours prior to scan and 6 hours post-scan to reduce risk of EDUARDO

## 2020-04-30 NOTE — H&P ADULT - NSICDXPASTSURGICALHX_GEN_ALL_CORE_FT
PAST SURGICAL HISTORY:  AICD (Automatic Cardioverter/Defibrillator) Present [employer] with 1 [employer] lead4/1/09- explanted and replaced with Right [employer] 2 leads on 9/2/09    H/O heart transplant 2/2018    History of Prior Ablation Treatment for afib    S/P right heart catheterization biopsy multiple    Status post left hip replacement

## 2020-04-30 NOTE — PROGRESS NOTE ADULT - ASSESSMENT
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 Per patient, has had chest pain intermittently since surgery but improves with tylenol prn (takes 1x/week) but this was more severe so contacted transplant coordinator. Also had checked temp at home and found to be 95. Denies any sick contacts, fevers/chills/NS/diarrhea. Due to concern of coronavirus infection was instructed to come to ER which he did via EMS. On arrival, VS were 98, , /94, 99% RA. Initial labs revealed leukopenia (with low lymphocyte), Hb 8.1 (lower than prior), Tbili 2.6 (prev 1.3 2/12). Reports pain is gone now. Abd US showed gallstone and sludge, without wall edema. Labs notable for lower Hb, and elevated TBili which is likely 2/2 cholelithiasis.  4/30 Covid PCR repeated. Pending GI/Gen. 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 Per patient, has had chest pain intermittently since surgery but improves with tylenol prn (takes 1x/week) but this was more severe so contacted transplant coordinator. Also had checked temp at home and found to be 95. Denies any sick contacts, fevers/chills/NS/diarrhea. Due to concern of coronavirus infection was instructed to come to ER which he did via EMS. On arrival, VS were 98, , /94, 99% RA. Initial labs revealed leukopenia (with low lymphocyte), Hb 8.1 (lower than prior), Tbili 2.6 (prev 1.3 2/12). Reports pain is gone now. Abd US showed gallstone and sludge, without wall edema. Labs notable for lower Hb, and elevated TBili which is likely 2/2 cholelithiasis.  4/30 Covid PCR repeated. Pending GI/Gen. surgery consult appreciated. CT A/P with IV and PO contrast to further evaluate patients abdominal pain. Ck dimer, LDH, haptoglobin. IVF 75ml/hr x 6hrs prior to and post CT. Ck guiac

## 2020-04-30 NOTE — PROGRESS NOTE ADULT - SUBJECTIVE AND OBJECTIVE BOX
VITAL SIGNS    Telemetry:    Vital Signs Last 24 Hrs  T(C): 36.6 (20 @ 07:00), Max: 36.9 (20 @ 17:51)  T(F): 97.8 (20 @ 07:00), Max: 98.4 (20 @ 17:51)  HR: 110 (20 07:00) (104 - 122)  BP: 123/87 (20 @ 07:00) (109/84 - 134/83)  RR: 18 (20 @ 07:00) (12 - 19)  SpO2: 99% (20 @ 07:00) (99% - 99%)             07:01  -   @ 09:58  --------------------------------------------------------  IN: 345 mL / OUT: 0 mL / NET: 345 mL       Daily Height in cm: 172.72 (2020 23:00)    Daily Weight in k.1 (2020 07:55)  Admit Wt: Drug Dosing Weight  Height (cm): 172.7 (2020 23:00)  Weight (kg): 74.8 (2020 23:00)  BMI (kg/m2): 25.1 (2020 23:00)  BSA (m2): 1.88 (2020 23:00)    Bilirubin Total, Serum: 2.6 mg/dL ( 07:44)  Bilirubin Direct, Serum: 0.3 mg/dL ( 07:44)  Bilirubin Total, Serum: 2.6 mg/dL ( 07:44)  Bilirubin Total, Serum: 2.6 mg/dL ( @ 17:52)  Bilirubin Total, Serum: 2.6 mg/dL ( @ 14:26)    CAPILLARY BLOOD GLUCOSE              MEDICATIONS  aspirin enteric coated 81 milliGRAM(s) Oral daily  magnesium oxide 400 milliGRAM(s) Oral daily  mycophenolate mofetil 500 milliGRAM(s) Oral two times a day  pravastatin 20 milliGRAM(s) Oral at bedtime  sodium bicarbonate 650 milliGRAM(s) Oral two times a day  sodium chloride 0.9% lock flush 3 milliLiter(s) IV Push every 8 hours  tacrolimus 5 milliGRAM(s) Oral <User Schedule>      >>> <<<  PHYSICAL EXAM  Subjective: NAD, notes b/l shoulder pain and epigastric pain  Neurology: alert and oriented x 3, nonfocal, no gross deficits  CV : s1s2 no JVD  Lungs: CTA  Abdomen: soft, NT,ND, (+ )BM  :  voiding  Extremities: -c/c/e      LABS      136  |  103  |  32<H>  ----------------------------<  96  5.6<H>   |  22  |  1.53<H>    Ca    9.1      2020 07:44  Phos  4.1     -  Mg     2.0         TPro  8.2  /  Alb  4.1  /  TBili  2.6<H>  /  DBili  0.3<H>  /  AST  23  /  ALT  7<L>  /  AlkPhos  75  30                                 7.1    4.04  )-----------( 209      ( 2020 07:44 )             22.8          PT/INR - ( 2020 16:16 )   PT: 13.3 sec;   INR: 1.16 ratio         PTT - ( 2020 16:16 )  PTT:30.6 sec       PAST MEDICAL & SURGICAL HISTORY:  Knee pain, right  HLD (hyperlipidemia)  Former smoker  DVT of upper extremity (deep vein thrombosis)  Hepatitis C virus  GIB (gastrointestinal bleeding)  Ventricular fibrillation: s/p AICD  PAF (paroxysmal atrial fibrillation): on xarelto  Non-Ischemic Cardiomyopathy  SVT (Supraventricular Tachycardia)  HTN  CHF (Congestive Heart Failure)  S/P right heart catheterization: biopsy multiple  H/O heart transplant: 2018  Status post left hip replacement  History of Prior Ablation Treatment: for afib  AICD (Automatic Cardioverter/Defibrillator) Present: [employer] with 1 [employer] lead09- explanted and replaced with [employer] 2 leads on 09

## 2020-04-30 NOTE — PROGRESS NOTE ADULT - PROBLEM SELECTOR PLAN 1
trend lft's  amylase/lipase  GI consult Gen Surgery consulted trend lft's  amylase/lipase  GI consult Gen Surgery consulted  CT abd po/IV contrast

## 2020-04-30 NOTE — H&P ADULT - NSHPREVIEWOFSYSTEMS_GEN_ALL_CORE
REVIEW OF SYSTEMS:  CONSTITUTIONAL: Denies fever, weight loss, or fatigue  EYES: Denies eye pain, visual disturbances, or discharge  RESPIRATORY: Denies SOB, cough, wheezing, chills or hemoptysis  CARDIOVASCULAR: +chest pain x1 day  GASTROINTESTINAL: +abdominal pain   GENITOURINARY: Denies dysuria, frequency, hematuria, or incontinence  NEUROLOGICAL: Denies headaches, memory loss, loss of strength, numbness, or tremors  SKIN: Denies itching, burning, rashes, or lesions   ENDOCRINE: Denies heat or cold intolerance, hair loss  MUSCULOSKELETAL: Denies joint pain or swelling, muscle, back, or extremity pain  PSYCHIATRIC: Denies depression, anxiety, mood swings, or difficulty sleeping  HEME/LYMPH: Denies easy bruising, denies bleeding gums and mucous membranes

## 2020-04-30 NOTE — H&P ADULT - NSICDXPASTMEDICALHX_GEN_ALL_CORE_FT
PAST MEDICAL HISTORY:  CHF (Congestive Heart Failure)     DVT of upper extremity (deep vein thrombosis)     Former smoker     GIB (gastrointestinal bleeding)     Hepatitis C virus     HLD (hyperlipidemia)     HTN     Knee pain, right     Non-Ischemic Cardiomyopathy     PAF (paroxysmal atrial fibrillation) on xarelto    SVT (Supraventricular Tachycardia)     Ventricular fibrillation s/p AICD

## 2020-05-01 LAB
ALBUMIN SERPL ELPH-MCNC: 4.1 G/DL — SIGNIFICANT CHANGE UP (ref 3.3–5)
ALP SERPL-CCNC: 86 U/L — SIGNIFICANT CHANGE UP (ref 40–120)
ALT FLD-CCNC: 7 U/L — LOW (ref 10–45)
ANION GAP SERPL CALC-SCNC: 11 MMOL/L — SIGNIFICANT CHANGE UP (ref 5–17)
ANION GAP SERPL CALC-SCNC: 12 MMOL/L — SIGNIFICANT CHANGE UP (ref 5–17)
ANION GAP SERPL CALC-SCNC: 12 MMOL/L — SIGNIFICANT CHANGE UP (ref 5–17)
ANTIBODY ID 1_1: SIGNIFICANT CHANGE UP
AST SERPL-CCNC: 22 U/L — SIGNIFICANT CHANGE UP (ref 10–40)
BILIRUB DIRECT SERPL-MCNC: 0.4 MG/DL — HIGH (ref 0–0.2)
BILIRUB SERPL-MCNC: 2.9 MG/DL — HIGH (ref 0.2–1.2)
BLD GP AB SCN SERPL QL: POSITIVE — SIGNIFICANT CHANGE UP
BUN SERPL-MCNC: 26 MG/DL — HIGH (ref 7–23)
BUN SERPL-MCNC: 27 MG/DL — HIGH (ref 7–23)
BUN SERPL-MCNC: 29 MG/DL — HIGH (ref 7–23)
CALCIUM SERPL-MCNC: 8.8 MG/DL — SIGNIFICANT CHANGE UP (ref 8.4–10.5)
CALCIUM SERPL-MCNC: 9 MG/DL — SIGNIFICANT CHANGE UP (ref 8.4–10.5)
CALCIUM SERPL-MCNC: 9 MG/DL — SIGNIFICANT CHANGE UP (ref 8.4–10.5)
CHLORIDE SERPL-SCNC: 101 MMOL/L — SIGNIFICANT CHANGE UP (ref 96–108)
CHLORIDE SERPL-SCNC: 102 MMOL/L — SIGNIFICANT CHANGE UP (ref 96–108)
CHLORIDE SERPL-SCNC: 103 MMOL/L — SIGNIFICANT CHANGE UP (ref 96–108)
CO2 SERPL-SCNC: 20 MMOL/L — LOW (ref 22–31)
CO2 SERPL-SCNC: 20 MMOL/L — LOW (ref 22–31)
CO2 SERPL-SCNC: 21 MMOL/L — LOW (ref 22–31)
CREAT SERPL-MCNC: 1.47 MG/DL — HIGH (ref 0.5–1.3)
CREAT SERPL-MCNC: 1.68 MG/DL — HIGH (ref 0.5–1.3)
CREAT SERPL-MCNC: 1.69 MG/DL — HIGH (ref 0.5–1.3)
DAT C3-SP REAG RBC QL: NEGATIVE — SIGNIFICANT CHANGE UP
DAT POLY-SP REAG RBC QL: POSITIVE — SIGNIFICANT CHANGE UP
DIRECT COOMBS IGG: POSITIVE — SIGNIFICANT CHANGE UP
ELUATE ANTIBODY 1: SIGNIFICANT CHANGE UP
GLUCOSE BLDC GLUCOMTR-MCNC: 165 MG/DL — HIGH (ref 70–99)
GLUCOSE SERPL-MCNC: 83 MG/DL — SIGNIFICANT CHANGE UP (ref 70–99)
GLUCOSE SERPL-MCNC: 89 MG/DL — SIGNIFICANT CHANGE UP (ref 70–99)
GLUCOSE SERPL-MCNC: 99 MG/DL — SIGNIFICANT CHANGE UP (ref 70–99)
HAPTOGLOB SERPL-MCNC: <20 MG/DL — LOW (ref 34–200)
HCT VFR BLD CALC: 24.1 % — LOW (ref 39–50)
HCT VFR BLD CALC: 24.3 % — LOW (ref 39–50)
HGB BLD-MCNC: 7.4 G/DL — LOW (ref 13–17)
HGB BLD-MCNC: 7.8 G/DL — LOW (ref 13–17)
MCHC RBC-ENTMCNC: 29.2 PG — SIGNIFICANT CHANGE UP (ref 27–34)
MCHC RBC-ENTMCNC: 30.5 GM/DL — LOW (ref 32–36)
MCHC RBC-ENTMCNC: 31 PG — SIGNIFICANT CHANGE UP (ref 27–34)
MCHC RBC-ENTMCNC: 32.4 GM/DL — SIGNIFICANT CHANGE UP (ref 32–36)
MCV RBC AUTO: 95.6 FL — SIGNIFICANT CHANGE UP (ref 80–100)
MCV RBC AUTO: 96 FL — SIGNIFICANT CHANGE UP (ref 80–100)
NRBC # BLD: 0 /100 WBCS — SIGNIFICANT CHANGE UP (ref 0–0)
NRBC # BLD: 0 /100 WBCS — SIGNIFICANT CHANGE UP (ref 0–0)
OB PNL STL: NEGATIVE — SIGNIFICANT CHANGE UP
PLATELET # BLD AUTO: 241 K/UL — SIGNIFICANT CHANGE UP (ref 150–400)
PLATELET # BLD AUTO: 246 K/UL — SIGNIFICANT CHANGE UP (ref 150–400)
POTASSIUM SERPL-MCNC: 5.8 MMOL/L — HIGH (ref 3.5–5.3)
POTASSIUM SERPL-MCNC: 5.9 MMOL/L — HIGH (ref 3.5–5.3)
POTASSIUM SERPL-MCNC: 6.4 MMOL/L — CRITICAL HIGH (ref 3.5–5.3)
POTASSIUM SERPL-SCNC: 5.8 MMOL/L — HIGH (ref 3.5–5.3)
POTASSIUM SERPL-SCNC: 5.9 MMOL/L — HIGH (ref 3.5–5.3)
POTASSIUM SERPL-SCNC: 6.4 MMOL/L — CRITICAL HIGH (ref 3.5–5.3)
PROT SERPL-MCNC: 8.4 G/DL — HIGH (ref 6–8.3)
RBC # BLD: 2.52 M/UL — LOW (ref 4.2–5.8)
RBC # BLD: 2.53 M/UL — LOW (ref 4.2–5.8)
RBC # FLD: 15.4 % — HIGH (ref 10.3–14.5)
RBC # FLD: 15.6 % — HIGH (ref 10.3–14.5)
RH IG SCN BLD-IMP: POSITIVE — SIGNIFICANT CHANGE UP
SODIUM SERPL-SCNC: 133 MMOL/L — LOW (ref 135–145)
SODIUM SERPL-SCNC: 133 MMOL/L — LOW (ref 135–145)
SODIUM SERPL-SCNC: 136 MMOL/L — SIGNIFICANT CHANGE UP (ref 135–145)
TACROLIMUS SERPL-MCNC: 10.5 NG/ML — SIGNIFICANT CHANGE UP
WBC # BLD: 4.8 K/UL — SIGNIFICANT CHANGE UP (ref 3.8–10.5)
WBC # BLD: 4.88 K/UL — SIGNIFICANT CHANGE UP (ref 3.8–10.5)
WBC # FLD AUTO: 4.8 K/UL — SIGNIFICANT CHANGE UP (ref 3.8–10.5)
WBC # FLD AUTO: 4.88 K/UL — SIGNIFICANT CHANGE UP (ref 3.8–10.5)

## 2020-05-01 PROCEDURE — 99221 1ST HOSP IP/OBS SF/LOW 40: CPT

## 2020-05-01 PROCEDURE — 99232 SBSQ HOSP IP/OBS MODERATE 35: CPT

## 2020-05-01 PROCEDURE — 99223 1ST HOSP IP/OBS HIGH 75: CPT | Mod: GC

## 2020-05-01 PROCEDURE — 99222 1ST HOSP IP/OBS MODERATE 55: CPT | Mod: GC

## 2020-05-01 PROCEDURE — 86077 PHYS BLOOD BANK SERV XMATCH: CPT

## 2020-05-01 PROCEDURE — 93306 TTE W/DOPPLER COMPLETE: CPT | Mod: 26

## 2020-05-01 PROCEDURE — 99233 SBSQ HOSP IP/OBS HIGH 50: CPT

## 2020-05-01 RX ORDER — ATOVAQUONE 750 MG/5ML
1500 SUSPENSION ORAL DAILY
Refills: 0 | Status: DISCONTINUED | OUTPATIENT
Start: 2020-05-01 | End: 2020-05-04

## 2020-05-01 RX ORDER — SODIUM ZIRCONIUM CYCLOSILICATE 10 G/10G
10 POWDER, FOR SUSPENSION ORAL
Refills: 0 | Status: DISCONTINUED | OUTPATIENT
Start: 2020-05-01 | End: 2020-05-01

## 2020-05-01 RX ORDER — SODIUM ZIRCONIUM CYCLOSILICATE 10 G/10G
10 POWDER, FOR SUSPENSION ORAL
Refills: 0 | Status: DISCONTINUED | OUTPATIENT
Start: 2020-05-02 | End: 2020-05-07

## 2020-05-01 RX ORDER — SODIUM ZIRCONIUM CYCLOSILICATE 10 G/10G
5 POWDER, FOR SUSPENSION ORAL ONCE
Refills: 0 | Status: COMPLETED | OUTPATIENT
Start: 2020-05-01 | End: 2020-05-01

## 2020-05-01 RX ORDER — VALGANCICLOVIR 450 MG/1
450 TABLET, FILM COATED ORAL DAILY
Refills: 0 | Status: DISCONTINUED | OUTPATIENT
Start: 2020-05-01 | End: 2020-05-07

## 2020-05-01 RX ORDER — INSULIN HUMAN 100 [IU]/ML
10 INJECTION, SOLUTION SUBCUTANEOUS ONCE
Refills: 0 | Status: COMPLETED | OUTPATIENT
Start: 2020-05-01 | End: 2020-05-01

## 2020-05-01 RX ORDER — DEXTROSE 50 % IN WATER 50 %
50 SYRINGE (ML) INTRAVENOUS ONCE
Refills: 0 | Status: COMPLETED | OUTPATIENT
Start: 2020-05-01 | End: 2020-05-01

## 2020-05-01 RX ADMIN — MAGNESIUM OXIDE 400 MG ORAL TABLET 400 MILLIGRAM(S): 241.3 TABLET ORAL at 07:45

## 2020-05-01 RX ADMIN — SODIUM CHLORIDE 3 MILLILITER(S): 9 INJECTION INTRAMUSCULAR; INTRAVENOUS; SUBCUTANEOUS at 12:54

## 2020-05-01 RX ADMIN — INSULIN HUMAN 10 UNIT(S): 100 INJECTION, SOLUTION SUBCUTANEOUS at 12:39

## 2020-05-01 RX ADMIN — SODIUM CHLORIDE 3 MILLILITER(S): 9 INJECTION INTRAMUSCULAR; INTRAVENOUS; SUBCUTANEOUS at 21:49

## 2020-05-01 RX ADMIN — MYCOPHENOLATE MOFETIL 500 MILLIGRAM(S): 250 CAPSULE ORAL at 05:34

## 2020-05-01 RX ADMIN — TACROLIMUS 5 MILLIGRAM(S): 5 CAPSULE ORAL at 07:45

## 2020-05-01 RX ADMIN — SODIUM CHLORIDE 3 MILLILITER(S): 9 INJECTION INTRAMUSCULAR; INTRAVENOUS; SUBCUTANEOUS at 05:34

## 2020-05-01 RX ADMIN — SODIUM ZIRCONIUM CYCLOSILICATE 5 GRAM(S): 10 POWDER, FOR SUSPENSION ORAL at 16:23

## 2020-05-01 RX ADMIN — Medication 650 MILLIGRAM(S): at 05:34

## 2020-05-01 RX ADMIN — Medication 650 MILLIGRAM(S): at 20:20

## 2020-05-01 RX ADMIN — Medication 20 MILLIGRAM(S): at 21:47

## 2020-05-01 RX ADMIN — Medication 81 MILLIGRAM(S): at 07:44

## 2020-05-01 RX ADMIN — Medication 75 MILLIGRAM(S): at 19:25

## 2020-05-01 RX ADMIN — SODIUM ZIRCONIUM CYCLOSILICATE 5 GRAM(S): 10 POWDER, FOR SUSPENSION ORAL at 18:45

## 2020-05-01 RX ADMIN — Medication 50 MILLILITER(S): at 12:38

## 2020-05-01 RX ADMIN — SODIUM ZIRCONIUM CYCLOSILICATE 5 GRAM(S): 10 POWDER, FOR SUSPENSION ORAL at 12:54

## 2020-05-01 RX ADMIN — MYCOPHENOLATE MOFETIL 500 MILLIGRAM(S): 250 CAPSULE ORAL at 20:20

## 2020-05-01 RX ADMIN — TACROLIMUS 5 MILLIGRAM(S): 5 CAPSULE ORAL at 20:20

## 2020-05-01 NOTE — PROGRESS NOTE ADULT - SUBJECTIVE AND OBJECTIVE BOX
SURGERY DAILY PROGRESS NOTE:      S:   Patient seen and examined. No acute events overnight. Pain is well controlled with medication. GI fxn +/+. Tolerating diet w/o N/V      O:   Exam:  Gen: NAD. A&Ox3.  Well developed, alert and cooperative.   Resp: No additional work of breathing.   Card: RR. No peripheral edema or pallor.   Abd: Soft, ND, NT. Pain is not localized, not severe in RUQ, predominantly left sides.   Ext: WWP. Able to move all extremities equally.    Vital Signs Last 24 Hrs  T(C): 36.8 (01 May 2020 05:25), Max: 36.8 (01 May 2020 05:25)  T(F): 98.2 (01 May 2020 05:25), Max: 98.2 (01 May 2020 05:25)  HR: 113 (01 May 2020 05:25) (113 - 116)  BP: 123/83 (01 May 2020 05:25) (118/74 - 130/89)  BP(mean): 103 (30 Apr 2020 19:43) (103 - 103)  RR: 18 (01 May 2020 05:25) (18 - 18)  SpO2: 100% (01 May 2020 05:25) (99% - 100%)      04-30-20 @ 07:01 - 05-01-20 @ 07:00  --------------------------------------------------------  IN: 1965 mL / OUT: 1100 mL / NET: 865 mL    05-01-20 @ 07:01  - 05-01-20 @ 10:14  --------------------------------------------------------  IN: 300 mL / OUT: 0 mL / NET: 300 mL        LABS:                        7.8    4.80  )-----------( 246      ( 01 May 2020 07:25 )             24.1     05-01    133<L>  |  102  |  27<H>  ----------------------------<  99  5.8<H>   |  20<L>  |  1.47<H>    Ca    9.0      01 May 2020 07:25  Phos  4.1     04-29  Mg     2.0     04-29    TPro  8.4<H>  /  Alb  4.1  /  TBili  2.9<H>  /  DBili  x   /  AST  22  /  ALT  7<L>  /  AlkPhos  86  05-01    PT/INR - ( 29 Apr 2020 16:16 )   PT: 13.3 sec;   INR: 1.16 ratio         PTT - ( 29 Apr 2020 16:16 )  PTT:30.6 sec

## 2020-05-01 NOTE — CONSULT NOTE ADULT - ATTENDING COMMENTS
Pt seen and examined this morning. Agree with A/P. Pt states pain is better but still feels heaviness throughout chest and upper abdomen and pain in both shoulders. Tolerating a regular diet. Denies nausea or vomiting. Abdominal exam is benign. CT shows thickened stomach, possible gastritis and gallstones. No surgical intervention at this time. GI evaluation, possible endoscopy. Will follow.
History of cardiac transplant in February, history of antibody mediated rejection treated with IVIG/plasmapharesis/Rituximab, on immunosuppression with tacrolimus/cellcept found to have worsening anemia.  Clearly hemolyzing with increased LDH/indirect bili, hapto <20.  Normal coags/fibrinogen.    Direct Jacky positive with warm antibody.  CT scan no evidence of any underlying lymphoproliferative disease (too early to develop PTLD).  WBC/diff/platelets are normal.  Check hep C VL, HIV.  No evidence of TTP on peripheral smear, no schistocytes (can be associated with prograf)  Discussed with Dr. Viramontes transplant cardiology, would begin steroids 1 mg/kg for warm AIHA.  Can take up to 2 weeks to see response of steroids.  Agree with mepron/valcyte prophylaxis.  Monitor daily LDH/bilirubin with CBC.  Transfuse only if Hg <7, blood bank should be aware of autoimmune hemolytic anemia.
Agree with above. 71 yo with hx of cardiomyopathy s/p heart transplant reporting chest/abdominal pain. No other GI symptoms. Currently reporting no abdominal pain. Sono with gallstones and essentially normal LFTs. Follow up CT.

## 2020-05-01 NOTE — CONSULT NOTE ADULT - ASSESSMENT
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 Unclear etiology, suspecting Cholelithiasis. Hematology was consulted for hemolytic anemia     # Warm autoimmune hemolytic anemia  Jacky shows IgG Ab+. Evekated tara, LDH, and low hapto. Smear shows no schistocytes and + microsphrocytes. Not TTP/HUS. Consistent with hemolytic anemia. Need to look into the etiologyl; HCV, HIV, peripheral smear,   -Check HCV viral quant, HIV  -Peripheral flow cytometry was sent on 5/1. F/U result  -Check CBC, retics, LDH, CMP, Hapto daily for now  -Start folic acid po daily  -Recommend to start prednisone 1mg/kg po daily for autoimmune hemolytic anemia    The case was discussed with Dr. Prabhu Alamo MD, MPH  Hematology/Oncology Fellow  Pager: [phone]

## 2020-05-01 NOTE — PROGRESS NOTE ADULT - ASSESSMENT
Patient is a 70 year old male with a PMH of a heart transplant, UGI bleed in 2018, hep C, b/l subclavian DVTs, CKD, and AICD in the past who presented to the hospital with abdominal pain and found to have cholelithiasis. CT 4/30 with antral thickening and no acute cholecystitis. GI consulted and found no roll for endoscopy at this time.     PLAN:  - Unclear etiology of patients abdominal pain but unlikely due to gallstones as symptoms would be atypical of this and pain is located on the left side.  Inpatient ultrasound does not suggest cholecystitis, as it showed a normal gallbladder wall and no pericholecystic fluid.  CT A/P does not suggest cholecystitis, no wall thickening and bile ducts of normal calliper   - Appreciate GI recommendations regarding thickening of antrum on CT A/P  - No roll for surgical intervention at this time. Please call with further questions    ACS// [id number]

## 2020-05-01 NOTE — PROGRESS NOTE ADULT - SUBJECTIVE AND OBJECTIVE BOX
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 650 milliGRAM(s) Oral every 6 hours PRN  aspirin enteric coated 81 milliGRAM(s) Oral daily  magnesium oxide 400 milliGRAM(s) Oral daily  pravastatin 20 milliGRAM(s) Oral at bedtime  predniSONE   Tablet 75 milliGRAM(s) Oral daily  sodium bicarbonate 650 milliGRAM(s) Oral two times a day  sodium chloride 0.9% lock flush 3 milliLiter(s) IV Push every 8 hours  sodium chloride 0.9%. 1000 milliLiter(s) IV Continuous <Continuous>  sodium zirconium cyclosilicate 5 Gram(s) Oral once      Objective:  Vital Signs Last 24 Hrs  T(C): 36.6 (01 May 2020 12:24), Max: 36.8 (01 May 2020 05:25)  T(F): 97.9 (01 May 2020 12:24), Max: 98.2 (01 May 2020 05:25)  HR: 114 (01 May 2020 12:24) (113 - 116)  BP: 109/75 (01 May 2020 12:24) (109/75 - 130/89)  BP(mean): 103 (30 Apr 2020 19:43) (103 - 103)  RR: 18 (01 May 2020 12:24) (18 - 18)  SpO2: 99% (01 May 2020 12:24) (99% - 100%)    PHYSICAL EXAM:  Constitutional:no acute distress  Eyes:WALT, EOMI  Ear/Nose/Throat: no oral lesions, 	  Respiratory: clear BL  Cardiovascular: [id number] healed  Gastrointestinal:soft, (+) BS, no tenderness to palpation, chubby  Extremities:no e/e/c  No Lymphadenopathy  IV sites not inflammed.    LABS:                        7.4    4.88  )-----------( 241      ( 01 May 2020 11:15 )             24.3     05-01    136  |  103  |  29<H>  ----------------------------<  83  5.9<H>   |  21<L>  |  1.69<H>    Ca    8.8      01 May 2020 15:58    TPro  x   /  Alb  x   /  TBili  x   /  DBili  0.4<H>  /  AST  x   /  ALT  x   /  AlkPhos  x   05-01          MICROBIOLOGY:    COVID-19 PCR: NotDetec: This test has been validated by [employer] to be accurate;  though it has not been FDA cleared/approved by the usual pathway  As with all laboratory test, results should be correlated with clinical  findings.  https://www.fda.gov/media/389173/download  https://www.fda.gov/media/272073/download (04.30.20 @ 09:47)    Cytomegalovirus By PCR:   Det <50 (02.12.20 @ 10:21)            RECENT CULTURES:      RADIOLOGY & ADDITIONAL STUDIES:    < from: CT Abdomen and Pelvis w/ Oral Cont and w/ IV Cont (04.30.20 @ 19:32) >  IMPRESSION:     Cholelithiasis without any findings to suggest acute cholecystitis or any biliary obstruction.    Peristalsis versus mural thickening gastric antrum.    Additional findings as above.    < end of copied text >

## 2020-05-01 NOTE — PROGRESS NOTE ADULT - ASSESSMENT
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 Per patient, has had chest pain intermittently since surgery but improves with tylenol prn (takes 1x/week) but this was more severe so contacted transplant coordinator. Also had checked temp at home and found to be 95. Denies any sick contacts, fevers/chills/NS/diarrhea. Due to concern of coronavirus infection was instructed to come to ER which he did via EMS. On arrival, VS were 98, , /94, 99% RA. Initial labs revealed leukopenia (with low lymphocyte), Hb 8.1 (lower than prior), Tbili 2.6 (prev 1.3 2/12). Reports pain is gone now. Abd US showed gallstone and sludge, without wall edema. Labs notable for lower Hb, and elevated TBili which is likely 2/2 cholelithiasis.  4/30 Covid PCR repeated. Pending GI/Gen. surgery consult appreciated. CT A/P with IV and PO contrast to further evaluate patients abdominal pain. Ck dimer, LDH, haptoglobin. IVF 75ml/hr x 6hrs prior to and post CT. Ck guiac  5/1< from: CT Abdomen and Pelvis w/ Oral Cont and w/ IV Cont (04.30.20 @ 19:32) >    Cholelithiasis without any findings to suggest acute cholecystitis or any biliary obstruction.    Peristalsis versus mural thickening gastric antrum.    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 Per patient, has had chest pain intermittently since surgery but improves with tylenol prn (takes 1x/week) but this was more severe so contacted transplant coordinator. Also had checked temp at home and found to be 95. Denies any sick contacts, fevers/chills/NS/diarrhea. Due to concern of coronavirus infection was instructed to come to ER which he did via EMS. On arrival, VS were 98, , /94, 99% RA. Initial labs revealed leukopenia (with low lymphocyte), Hb 8.1 (lower than prior), Tbili 2.6 (prev 1.3 2/12). Reports pain is gone now. Abd US showed gallstone and sludge, without wall edema. Labs notable for lower Hb, and elevated TBili which is likely 2/2 cholelithiasis.  4/30 Covid PCR repeated. Pending GI/Gen. surgery consult appreciated. CT A/P with IV and PO contrast to further evaluate patients abdominal pain. Ck dimer, LDH, haptoglobin. IVF 75ml/hr x 6hrs prior to and post CT. Ck guiac  5/1< from: CT Abdomen and Pelvis w/ Oral Cont and w/ IV Cont (04.30.20 @ 19:32) >    Cholelithiasis without any findings to suggest acute cholecystitis or any biliary obstruction.    Peristalsis versus mural thickening gastric antrum.    < end of copied text >  Anemia, repeat cbc    Gen surg, unclear etiology

## 2020-05-01 NOTE — PROGRESS NOTE ADULT - ASSESSMENT
70 y/o M w/ h/o NICM s/p HM2 s/p OHT 18 with coronary fistula, HCV + s/p Rx, prior b/l subclavian DVT s/p Rx, prior AMR s/p IVIG/plasmapharesis/Rituximab, CKD (b/l Cr 1.4) who presents with pain across from chest for past day which was increasingly severe along with abdominal tightness. Brought in for concern of Covid which returned negative. Abdom US revealed gall stones w/o cholecystitis. Labs notable for dropping Hb, and elevated TBili with indirect component. Recent RHC  nl hemodynamics and negative for rejection.  stress TTE  also negative for ischemia with normal graft function. Unclear etiology of dropping H/H and elevated indirect bilirubin but perhaps related to his coronary fistula which may be causing intravascular hemolysis  - trend H/H; anemia workup was unrevealing; would also check SPEP/UPEP  - labs suggestive of hemolysis; evaluate smear for schistocytes; please consult hematology to evaluate for autoimmune component but possibly 2/2 mechanical hemolysis from fistula  - reduce tac to 4/4 for goal 6-8  - abdominal imaging indicates cholelithiasis however no concern for cholecystitis

## 2020-05-01 NOTE — PROGRESS NOTE ADULT - SUBJECTIVE AND OBJECTIVE BOX
Interval History:  denies any discomfort  labs suggest hemolysis from unclear etiology  CT A/P w/ cholelithiasis but no cholecystitis    Medications:  acetaminophen   Tablet .. 650 milliGRAM(s) Oral every 6 hours PRN  aspirin enteric coated 81 milliGRAM(s) Oral daily  magnesium oxide 400 milliGRAM(s) Oral daily  mycophenolate mofetil 500 milliGRAM(s) Oral two times a day  pravastatin 20 milliGRAM(s) Oral at bedtime  sodium bicarbonate 650 milliGRAM(s) Oral two times a day  sodium chloride 0.9% lock flush 3 milliLiter(s) IV Push every 8 hours  sodium chloride 0.9%. 1000 milliLiter(s) IV Continuous <Continuous>  sodium zirconium cyclosilicate 5 Gram(s) Oral once  tacrolimus 5 milliGRAM(s) Oral <User Schedule>      Vitals:  T(C): 36.6 (20 @ 12:24), Max: 36.8 (20 @ 05:25)  HR: 114 (20 @ 12:24) (113 - 116)  BP: 109/75 (20 @ 12:24) (109/75 - 130/89)  BP(mean): 103 (20 @ 19:43) (103 - 103)  RR: 18 (20 @ 12:24) (18 - 18)  SpO2: 99% (20 @ 12:24) (99% - 100%)    Daily     Daily Weight in k.4 (01 May 2020 07:48)    Weight (kg): 74.8 ( @ 23:00)    I&O's Summary    2020 07:  -  01 May 2020 07:00  --------------------------------------------------------  IN: 1965 mL / OUT: 1100 mL / NET: 865 mL    01 May 2020 07:  -  01 May 2020 15:05  --------------------------------------------------------  IN: 420 mL / OUT: 200 mL / NET: 220 mL    Physical Exam:  Appearance: No Acute Distress  HEENT: PERRL  Neck: No JVD  Cardiovascular: Normal S1 S2, pansystolic murmur best heard near sternal border  Respiratory: Clear to auscultation bilaterally  Gastrointestinal: Soft, Non-tender	  Skin: No cyanosis	  Neurologic: Non-focal  Extremities: No LE edema  Psychiatry: A & O x 3, Mood & affect appropriate    Labs:                        7.4    4.88  )-----------( 241      ( 01 May 2020 11:15 )             24.3     05-    133<L>  |  101  |  26<H>  ----------------------------<  89  6.4<HH>   |  20<L>  |  1.68<H>    Ca    9.0      01 May 2020 11:15    TPro  8.4<H>  /  Alb  4.1  /  TBili  2.9<H>  /  DBili  x   /  AST  22  /  ALT  7<L>  /  AlkPhos  86  05-01    PT/INR - ( 2020 16:16 )   PT: 13.3 sec;   INR: 1.16 ratio         PTT - ( 2020 16:16 )  PTT:30.6 sec

## 2020-05-01 NOTE — PROGRESS NOTE ADULT - ASSESSMENT
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 Unclear etiology, suspecting Cholelithiasis      Abdominal pain    Gall stone seen on CT and US but without evidence of acute cholecystitis  Will likely need gall bladder removal in the future  Not definite source of his pain  Also noted was the gastric antrum finding/thickening? and when able to perform- he should undergo an endoscopy      Hemolytic anemia    Evaluated by Hematology - Jacky shows IgG Ab+. Evekated tara, LDH, and low hapto. Smear shows no schistocytes and + microspherocytes. Not TTP/HUS. Consistent with hemolytic anemia.     will check HCV viral load  Hep B serologies  HIV ab  CMV PCR    OI prophylaxis    Per heme-  patient to begin a 1mg/kg/day prednisone regimen and will need to restart OI prophylaxis  Valganciclovir 450mg/day  Mepron for now but plan to change to Bactrim once his potassium is better controlled      Gary Lujan MD  [phone]  After 5pm/weekends [phone]

## 2020-05-01 NOTE — PROGRESS NOTE ADULT - SUBJECTIVE AND OBJECTIVE BOX
VITAL SIGNS    Telemetry:      Vital Signs Last 24 Hrs  T(C): 36.8 (20 @ 05:25), Max: 36.8 (20 @ 05:25)  T(F): 98.2 (20 @ 05:25), Max: 98.2 (20 @ 05:25)  HR: 113 (20 @ 05:25) (113 - 116)  BP: 123/83 (20 @ 05:25) (118/74 - 130/89)  RR: 18 (20 @ 05:25) (18 - 18)  SpO2: 100% (20 @ 05:25) (99% - 100%)                    @ 07:  -   @ 07:00  --------------------------------------------------------  IN: 1965 mL / OUT: 1100 mL / NET: 865 mL     @ 07:01  -   @ 10:28  --------------------------------------------------------  IN: 300 mL / OUT: 0 mL / NET: 300 mL          Daily     Daily Weight in k.4 (01 May 2020 07:48)            CAPILLARY BLOOD GLUCOSE                Drains:     MS         [  ] Drainage:                 L Pleural  [  ]  Drainage:                R Pleural  [  ]  Drainage:    Pacing Wires        [  ]   Settings:                                  Isolated  [  ]    Coumadin    [ ] YES          [  ]      NO                                   PHYSICAL EXAM        Neurology: alert and oriented x 3, nonfocal, no gross deficits  CV : s1 s2 RRR  Sternal Wound :  CDI , Stable  Lungs: cta  Abdomen: soft, nontender, nondistended, positive bowel sounds, last bowel movement                       chest tubes  :    voiding / diaz - sbd         Extremities:      edema   /  -   calve tenderness ,    L leg  /  R leg  incisions cdi          acetaminophen   Tablet .. 650 milliGRAM(s) Oral every 6 hours PRN  aspirin enteric coated 81 milliGRAM(s) Oral daily  magnesium oxide 400 milliGRAM(s) Oral daily  mycophenolate mofetil 500 milliGRAM(s) Oral two times a day  pravastatin 20 milliGRAM(s) Oral at bedtime  sodium bicarbonate 650 milliGRAM(s) Oral two times a day  sodium chloride 0.9% lock flush 3 milliLiter(s) IV Push every 8 hours  sodium chloride 0.9%. 1000 milliLiter(s) IV Continuous <Continuous>  tacrolimus 5 milliGRAM(s) Oral <User Schedule>                    Physical Therapy Rec:   Home  [  ]   Home w/ PT  [  ]  Rehab  [  ]  Discussed with Cardiothoracic Team at AM rounds. im ok    VITAL SIGNS    Telemetry:  nsr /st 106    Vital Signs Last 24 Hrs  T(C): 36.8 (20 @ 05:25), Max: 36.8 (20 @ 05:25)  T(F): 98.2 (20 @ 05:25), Max: 98.2 (20 @ 05:25)  HR: 113 (20 @ 05:25) (113 - 116)  BP: 123/83 (20 @ 05:25) (118/74 - 130/89)  RR: 18 (20 @ 05:25) (18 - 18)  SpO2: 100% (20 @ 05:25) (99% - 100%)                   0430 @ 07:01  -   @ 07:00  --------------------------------------------------------  IN: 1965 mL / OUT: 1100 mL / NET: 865 mL     @ 07:01  -   @ 10:28  --------------------------------------------------------  IN: 300 mL / OUT: 0 mL / NET: 300 mL          Daily     Daily Weight in k.4 (01 May 2020 07:48)            CAPILLARY BLOOD GLUCOSE                                             PHYSICAL EXAM        Neurology: alert and oriented x 3, nonfocal, no gross deficits  CV : s1 s2 RRR  Sternal Wound :  CDI , Stable, healed  Lungs: cta  Abdomen: soft, nontender, nondistended, positive bowel sounds  :    voiding        Extremities:     - edema   /  -   calve tenderness ,              acetaminophen   Tablet .. 650 milliGRAM(s) Oral every 6 hours PRN  aspirin enteric coated 81 milliGRAM(s) Oral daily  magnesium oxide 400 milliGRAM(s) Oral daily  mycophenolate mofetil 500 milliGRAM(s) Oral two times a day  pravastatin 20 milliGRAM(s) Oral at bedtime  sodium bicarbonate 650 milliGRAM(s) Oral two times a day  sodium chloride 0.9% lock flush 3 milliLiter(s) IV Push every 8 hours  sodium chloride 0.9%. 1000 milliLiter(s) IV Continuous <Continuous>  tacrolimus 5 milliGRAM(s) Oral <User Schedule>                    Physical Therapy Rec:   Home  [  ]   Home w/ PT  [  ]  Rehab  [  ]  Discussed with Cardiothoracic Team at AM rounds.

## 2020-05-01 NOTE — CONSULT NOTE ADULT - SUBJECTIVE AND OBJECTIVE BOX
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 Unclear etiology, suspecting Cholelithiasis      PAST MEDICAL & SURGICAL HISTORY:  Knee pain, right  HLD (hyperlipidemia)  Former smoker  DVT of upper extremity (deep vein thrombosis)  Hepatitis C virus  GIB (gastrointestinal bleeding)  Ventricular fibrillation: s/p AICD  PAF (paroxysmal atrial fibrillation): on xarelto  Non-Ischemic Cardiomyopathy  SVT (Supraventricular Tachycardia)  HTN  CHF (Congestive Heart Failure)  S/P right heart catheterization: biopsy multiple  H/O heart transplant: 2/2018  Status post left hip replacement  History of Prior Ablation Treatment: for afib  AICD (Automatic Cardioverter/Defibrillator) Present: [employer] with 1 [employer] lead4/1/09- explanted and replaced with MROtronic 2 leads on 9/2/09      FAMILY HISTORY:  No pertinent family history in first degree relatives      MEDICATIONS  (STANDING):  aspirin enteric coated 81 milliGRAM(s) Oral daily  magnesium oxide 400 milliGRAM(s) Oral daily  mycophenolate mofetil 500 milliGRAM(s) Oral two times a day  pravastatin 20 milliGRAM(s) Oral at bedtime  predniSONE   Tablet 75 milliGRAM(s) Oral daily  sodium bicarbonate 650 milliGRAM(s) Oral two times a day  sodium chloride 0.9% lock flush 3 milliLiter(s) IV Push every 8 hours  sodium chloride 0.9%. 1000 milliLiter(s) (75 mL/Hr) IV Continuous <Continuous>  sodium zirconium cyclosilicate 5 Gram(s) Oral once  tacrolimus 5 milliGRAM(s) Oral <User Schedule>    MEDICATIONS  (PRN):  acetaminophen   Tablet .. 650 milliGRAM(s) Oral every 6 hours PRN Mild Pain (1 - 3)      Allergies  No Known Allergies      VITAL SIGNS:    T(F): 97.9 (05-01-20 @ 12:24), Max: 98.2 (05-01-20 @ 05:25)  HR: 114 (05-01-20 @ 12:24)  BP: 109/75 (05-01-20 @ 12:24)  RR: 18 (05-01-20 @ 12:24)  SpO2: 99% (05-01-20 @ 12:24)  Wt(kg): --    PHYSICAL EXAMINATION:  Constitutional: NAD   Respiratory: CTA b/l  Cardiovascular: Normal rate regular rhythm.   Gastrointestinal: soft and flat, no tenderness to palpation  Extremities:  No c/c/e  MSK: No joint swelling, erythema, or tenderness   Neurological: AOx3, Cranial nerves II-XII grossly intact  Skin: No rash  Psych: Normal affect    LABS:                        7.4    4.88  )-----------( 241      ( 01 May 2020 11:15 )             24.3     05-01    136  |  103  |  29<H>  ----------------------------<  83  5.9<H>   |  21<L>  |  1.69<H>    Ca    8.8      01 May 2020 15:58    TPro  x   /  Alb  x   /  TBili  x   /  DBili  0.4<H>  /  AST  x   /  ALT  x   /  AlkPhos  x   05-01     Haptoglobin, Serum: <20 mg/dL (04-30 @ 18:07)

## 2020-05-02 DIAGNOSIS — E87.5 HYPERKALEMIA: ICD-10-CM

## 2020-05-02 DIAGNOSIS — R07.89 OTHER CHEST PAIN: ICD-10-CM

## 2020-05-02 DIAGNOSIS — D59.1 OTHER AUTOIMMUNE HEMOLYTIC ANEMIAS: ICD-10-CM

## 2020-05-02 LAB
ALBUMIN SERPL ELPH-MCNC: 4.1 G/DL — SIGNIFICANT CHANGE UP (ref 3.3–5)
ALP SERPL-CCNC: 79 U/L — SIGNIFICANT CHANGE UP (ref 40–120)
ALT FLD-CCNC: 6 U/L — LOW (ref 10–45)
ANION GAP SERPL CALC-SCNC: 11 MMOL/L — SIGNIFICANT CHANGE UP (ref 5–17)
AST SERPL-CCNC: 25 U/L — SIGNIFICANT CHANGE UP (ref 10–40)
BILIRUB DIRECT SERPL-MCNC: 0.3 MG/DL — HIGH (ref 0–0.2)
BILIRUB INDIRECT FLD-MCNC: 2 MG/DL — HIGH (ref 0.2–1)
BILIRUB SERPL-MCNC: 2.3 MG/DL — HIGH (ref 0.2–1.2)
BUN SERPL-MCNC: 33 MG/DL — HIGH (ref 7–23)
CALCIUM SERPL-MCNC: 8.7 MG/DL — SIGNIFICANT CHANGE UP (ref 8.4–10.5)
CHLORIDE SERPL-SCNC: 103 MMOL/L — SIGNIFICANT CHANGE UP (ref 96–108)
CO2 SERPL-SCNC: 19 MMOL/L — LOW (ref 22–31)
CREAT SERPL-MCNC: 1.53 MG/DL — HIGH (ref 0.5–1.3)
GLUCOSE BLDC GLUCOMTR-MCNC: 117 MG/DL — HIGH (ref 70–99)
GLUCOSE BLDC GLUCOMTR-MCNC: 121 MG/DL — HIGH (ref 70–99)
GLUCOSE BLDC GLUCOMTR-MCNC: 153 MG/DL — HIGH (ref 70–99)
GLUCOSE SERPL-MCNC: 206 MG/DL — HIGH (ref 70–99)
HCT VFR BLD CALC: 21.5 % — LOW (ref 39–50)
HGB BLD-MCNC: 6.8 G/DL — CRITICAL LOW (ref 13–17)
HIV 1+2 AB+HIV1 P24 AG SERPL QL IA: SIGNIFICANT CHANGE UP
LDH SERPL L TO P-CCNC: 360 U/L — HIGH (ref 50–242)
MCHC RBC-ENTMCNC: 30.5 PG — SIGNIFICANT CHANGE UP (ref 27–34)
MCHC RBC-ENTMCNC: 31.6 GM/DL — LOW (ref 32–36)
MCV RBC AUTO: 96.4 FL — SIGNIFICANT CHANGE UP (ref 80–100)
NRBC # BLD: 0 /100 WBCS — SIGNIFICANT CHANGE UP (ref 0–0)
PLATELET # BLD AUTO: 224 K/UL — SIGNIFICANT CHANGE UP (ref 150–400)
POTASSIUM SERPL-MCNC: 6.1 MMOL/L — HIGH (ref 3.5–5.3)
POTASSIUM SERPL-SCNC: 6.1 MMOL/L — HIGH (ref 3.5–5.3)
PROT SERPL-MCNC: 8.3 G/DL — SIGNIFICANT CHANGE UP (ref 6–8.3)
RBC # BLD: 2.23 M/UL — LOW (ref 4.2–5.8)
RBC # FLD: 15.8 % — HIGH (ref 10.3–14.5)
RETICS #: 76.2 K/UL — SIGNIFICANT CHANGE UP (ref 25–125)
RETICS/RBC NFR: 3.4 % — HIGH (ref 0.5–2.5)
SODIUM SERPL-SCNC: 133 MMOL/L — LOW (ref 135–145)
TACROLIMUS SERPL-MCNC: 7.1 NG/ML — SIGNIFICANT CHANGE UP
WBC # BLD: 5.3 K/UL — SIGNIFICANT CHANGE UP (ref 3.8–10.5)
WBC # FLD AUTO: 5.3 K/UL — SIGNIFICANT CHANGE UP (ref 3.8–10.5)

## 2020-05-02 PROCEDURE — 99232 SBSQ HOSP IP/OBS MODERATE 35: CPT

## 2020-05-02 PROCEDURE — 88189 FLOWCYTOMETRY/READ 16 & >: CPT

## 2020-05-02 PROCEDURE — 99233 SBSQ HOSP IP/OBS HIGH 50: CPT | Mod: GC

## 2020-05-02 RX ORDER — SODIUM ZIRCONIUM CYCLOSILICATE 10 G/10G
10 POWDER, FOR SUSPENSION ORAL
Refills: 0 | Status: DISCONTINUED | OUTPATIENT
Start: 2020-05-02 | End: 2020-05-02

## 2020-05-02 RX ORDER — INSULIN LISPRO 100/ML
VIAL (ML) SUBCUTANEOUS
Refills: 0 | Status: DISCONTINUED | OUTPATIENT
Start: 2020-05-02 | End: 2020-05-07

## 2020-05-02 RX ORDER — INSULIN LISPRO 100/ML
VIAL (ML) SUBCUTANEOUS AT BEDTIME
Refills: 0 | Status: DISCONTINUED | OUTPATIENT
Start: 2020-05-02 | End: 2020-05-07

## 2020-05-02 RX ORDER — SODIUM ZIRCONIUM CYCLOSILICATE 10 G/10G
10 POWDER, FOR SUSPENSION ORAL
Refills: 0 | Status: DISCONTINUED | OUTPATIENT
Start: 2020-05-02 | End: 2020-05-07

## 2020-05-02 RX ADMIN — Medication 2: at 12:06

## 2020-05-02 RX ADMIN — VALGANCICLOVIR 450 MILLIGRAM(S): 450 TABLET, FILM COATED ORAL at 12:04

## 2020-05-02 RX ADMIN — SODIUM CHLORIDE 3 MILLILITER(S): 9 INJECTION INTRAMUSCULAR; INTRAVENOUS; SUBCUTANEOUS at 05:25

## 2020-05-02 RX ADMIN — Medication 650 MILLIGRAM(S): at 05:24

## 2020-05-02 RX ADMIN — TACROLIMUS 5 MILLIGRAM(S): 5 CAPSULE ORAL at 08:03

## 2020-05-02 RX ADMIN — MAGNESIUM OXIDE 400 MG ORAL TABLET 400 MILLIGRAM(S): 241.3 TABLET ORAL at 12:04

## 2020-05-02 RX ADMIN — SODIUM CHLORIDE 3 MILLILITER(S): 9 INJECTION INTRAMUSCULAR; INTRAVENOUS; SUBCUTANEOUS at 22:09

## 2020-05-02 RX ADMIN — Medication 650 MILLIGRAM(S): at 18:19

## 2020-05-02 RX ADMIN — SODIUM ZIRCONIUM CYCLOSILICATE 10 GRAM(S): 10 POWDER, FOR SUSPENSION ORAL at 17:03

## 2020-05-02 RX ADMIN — MYCOPHENOLATE MOFETIL 500 MILLIGRAM(S): 250 CAPSULE ORAL at 19:57

## 2020-05-02 RX ADMIN — MYCOPHENOLATE MOFETIL 500 MILLIGRAM(S): 250 CAPSULE ORAL at 08:03

## 2020-05-02 RX ADMIN — SODIUM CHLORIDE 3 MILLILITER(S): 9 INJECTION INTRAMUSCULAR; INTRAVENOUS; SUBCUTANEOUS at 12:22

## 2020-05-02 RX ADMIN — Medication 20 MILLIGRAM(S): at 22:00

## 2020-05-02 RX ADMIN — TACROLIMUS 5 MILLIGRAM(S): 5 CAPSULE ORAL at 19:57

## 2020-05-02 RX ADMIN — Medication 81 MILLIGRAM(S): at 12:04

## 2020-05-02 RX ADMIN — ATOVAQUONE 1500 MILLIGRAM(S): 750 SUSPENSION ORAL at 12:04

## 2020-05-02 RX ADMIN — SODIUM ZIRCONIUM CYCLOSILICATE 10 GRAM(S): 10 POWDER, FOR SUSPENSION ORAL at 22:01

## 2020-05-02 NOTE — PROGRESS NOTE ADULT - SUBJECTIVE AND OBJECTIVE BOX
VITAL SIGNS    Telemetry: ST 90   Vital Signs Last 24 Hrs  T(C): 36.4 (05-02-20 @ 04:35), Max: 36.8 (05-01-20 @ 19:20)  T(F): 97.6 (05-02-20 @ 04:35), Max: 98.2 (05-01-20 @ 19:20)  HR: 94 (05-02-20 @ 04:35) (94 - 120)  BP: 113/79 (05-02-20 @ 04:35) (109/75 - 130/86)  RR: 18 (05-02-20 @ 04:35) (18 - 18)  SpO2: 99% (05-02-20 @ 04:35) (98% - 99%)            05-01 @ 07:01  -  05-02 @ 07:00  --------------------------------------------------------  IN: 1110 mL / OUT: 800 mL / NET: 310 mL       Daily     Daily   Admit Wt: Drug Dosing Weight  Height (cm): 172.7 (29 Apr 2020 23:00)  Weight (kg): 74.8 (29 Apr 2020 23:00)  BMI (kg/m2): 25.1 (29 Apr 2020 23:00)  BSA (m2): 1.88 (29 Apr 2020 23:00)    Bilirubin Direct, Serum: 0.3 mg/dL (05-02 @ 07:52)  Bilirubin Total, Serum: 2.3 mg/dL (05-02 @ 07:52)  Bilirubin Direct, Serum: 0.4 mg/dL (05-01 @ 16:36)    CAPILLARY BLOOD GLUCOSE      POCT Blood Glucose.: 165 mg/dL (01 May 2020 13:11)          MEDICATIONS  acetaminophen   Tablet .. 650 milliGRAM(s) Oral every 6 hours PRN  aspirin enteric coated 81 milliGRAM(s) Oral daily  atovaquone Suspension 1500 milliGRAM(s) Oral daily  magnesium oxide 400 milliGRAM(s) Oral daily  mycophenolate mofetil 500 milliGRAM(s) Oral two times a day  pravastatin 20 milliGRAM(s) Oral at bedtime  predniSONE   Tablet 75 milliGRAM(s) Oral daily  sodium bicarbonate 650 milliGRAM(s) Oral two times a day  sodium chloride 0.9% lock flush 3 milliLiter(s) IV Push every 8 hours  sodium chloride 0.9%. 1000 milliLiter(s) IV Continuous <Continuous>  sodium zirconium cyclosilicate 10 Gram(s) Oral <User Schedule>  tacrolimus 5 milliGRAM(s) Oral <User Schedule>  valGANciclovir 450 milliGRAM(s) Oral daily      >>> <<<  PHYSICAL EXAM  Subjective: NAD "feeling better" denies abd pain  Neurology: alert and oriented x 3, nonfocal, no gross deficits  CV : s1s2  Lungs: CTA b/l  Abdomen: soft, NT,ND, ( +)BM no melena  :  voiding  Extremities: -c/c/e       LABS  05-02    133<L>  |  103  |  33<H>  ----------------------------<  206<H>  6.1<H>   |  19<L>  |  1.53<H>    Ca    8.7      02 May 2020 07:52    TPro  8.3  /  Alb  4.1  /  TBili  2.3<H>  /  DBili  0.3<H>  /  AST  25  /  ALT  6<L>  /  AlkPhos  79  05-02                                 6.8    5.30  )-----------( 224      ( 02 May 2020 07:52 )             21.5                 PAST MEDICAL & SURGICAL HISTORY:  Knee pain, right  HLD (hyperlipidemia)  Former smoker  DVT of upper extremity (deep vein thrombosis)  Hepatitis C virus  GIB (gastrointestinal bleeding)  Ventricular fibrillation: s/p AICD  PAF (paroxysmal atrial fibrillation): on xarelto  Non-Ischemic Cardiomyopathy  SVT (Supraventricular Tachycardia)  HTN  CHF (Congestive Heart Failure)  S/P right heart catheterization: biopsy multiple  H/O heart transplant: 2/2018  Status post left hip replacement  History of Prior Ablation Treatment: for afib  AICD (Automatic Cardioverter/Defibrillator) Present: [employer] with 1 [employer] lead4/1/09- explanted and replaced with Medtronic 2 leads on 9/2/09

## 2020-05-02 NOTE — PROGRESS NOTE ADULT - ASSESSMENT
68 y/o M w/ h/o NICM s/p HM2 s/p OHT 18 with coronary fistula, HCV + s/p Rx, prior b/l subclavian DVT s/p Rx, prior AMR s/p IVIG/plasmapharesis/Rituximab, CKD (b/l Cr 1.4) who presents with pain across from chest for past day which was increasingly severe along with abdominal tightness. Brought in for concern of Covid which returned negative. Abdom US revealed gall stones w/o cholecystitis. Labs notable for dropping Hb, and elevated TBili with indirect component. Recent RHC  nl hemodynamics and negative for rejection.  stress TTE  also negative for ischemia with normal graft function.   Presented w/ worsening anemia, which has been found to be due to acute hemolytic process.  Unclear etiology but perhaps related to his coronary fistula which may be causing intravascular hemolysis.    Euvolemic on exam. 68 y/o M w/ h/o NICM s/p HM2 s/p OHT 18 with coronary fistula, HCV + s/p Rx, prior b/l subclavian DVT s/p Rx, prior AMR s/p IVIG/plasmapharesis/Rituximab, CKD (b/l Cr 1.4) who presents with pain across from chest for past day which was increasingly severe along with abdominal tightness. Brought in for concern of Covid which returned negative. Abdom US revealed gall stones w/o cholecystitis. Labs notable for dropping Hb, and elevated TBili with indirect component. Recent RHC  nl hemodynamics and negative for rejection.  stress TTE  also negative for ischemia with normal graft function.   Presented w/ worsening anemia, which has been found to be due to acute hemolytic process.  Initially thought to be d/t coronary fistula which may be causing intravascular hemolysis, however found to have warm agglutinins suggesting autoimmune component.    Euvolemic on exam.

## 2020-05-02 NOTE — PROGRESS NOTE ADULT - PROBLEM SELECTOR PLAN 1
- s/p RHC w/ biospy 2/20 w/ normal hemodynamics and neg for rejection  - cont tacrolimus 5 mg BID   - cont atovaquone for PCP prophylaxis   - cont valcyte for CMV prophylaxis   - on high dose steroids due to recent hemolytic anemia  - cellcept on hold due to recent hemolytic anemia.

## 2020-05-02 NOTE — PROGRESS NOTE ADULT - ASSESSMENT
VITAL SIGNS    Telemetry:  ST 90  Vital Signs Last 24 Hrs  T(C): 36.4 (05-02-20 @ 04:35), Max: 36.8 (05-01-20 @ 19:20)  T(F): 97.6 (05-02-20 @ 04:35), Max: 98.2 (05-01-20 @ 19:20)  HR: 94 (05-02-20 @ 04:35) (94 - 120)  BP: 113/79 (05-02-20 @ 04:35) (109/75 - 130/86)  RR: 18 (05-02-20 @ 04:35) (18 - 18)  SpO2: 99% (05-02-20 @ 04:35) (98% - 99%)            05-01 @ 07:01  -  05-02 @ 07:00  --------------------------------------------------------  IN: 1110 mL / OUT: 800 mL / NET: 310 mL       Daily     Daily   Admit Wt: Drug Dosing Weight  Height (cm): 172.7 (29 Apr 2020 23:00)  Weight (kg): 74.8 (29 Apr 2020 23:00)  BMI (kg/m2): 25.1 (29 Apr 2020 23:00)  BSA (m2): 1.88 (29 Apr 2020 23:00)    Bilirubin Direct, Serum: 0.3 mg/dL (05-02 @ 07:52)  Bilirubin Total, Serum: 2.3 mg/dL (05-02 @ 07:52)  Bilirubin Direct, Serum: 0.4 mg/dL (05-01 @ 16:36)    CAPILLARY BLOOD GLUCOSE      POCT Blood Glucose.: 165 mg/dL (01 May 2020 13:11)          MEDICATIONS  acetaminophen   Tablet .. 650 milliGRAM(s) Oral every 6 hours PRN  aspirin enteric coated 81 milliGRAM(s) Oral daily  atovaquone Suspension 1500 milliGRAM(s) Oral daily  magnesium oxide 400 milliGRAM(s) Oral daily  mycophenolate mofetil 500 milliGRAM(s) Oral two times a day  pravastatin 20 milliGRAM(s) Oral at bedtime  predniSONE   Tablet 75 milliGRAM(s) Oral daily  sodium bicarbonate 650 milliGRAM(s) Oral two times a day  sodium chloride 0.9% lock flush 3 milliLiter(s) IV Push every 8 hours  sodium chloride 0.9%. 1000 milliLiter(s) IV Continuous <Continuous>  sodium zirconium cyclosilicate 10 Gram(s) Oral <User Schedule>  tacrolimus 5 milliGRAM(s) Oral <User Schedule>  valGANciclovir 450 milliGRAM(s) Oral daily      >>> <<<  PHYSICAL EXAM  Subjective:  Neurology: alert and oriented x 3, nonfocal, no gross deficits  CV :  Sternal Wound :  CDI , Stable  Lungs:  Abdomen: soft, NT,ND, ( )BM  :  voiding  Extremities:       LABS  05-02    133<L>  |  103  |  33<H>  ----------------------------<  206<H>  6.1<H>   |  19<L>  |  1.53<H>    Ca    8.7      02 May 2020 07:52    TPro  8.3  /  Alb  4.1  /  TBili  2.3<H>  /  DBili  0.3<H>  /  AST  25  /  ALT  6<L>  /  AlkPhos  79  05-02                                 6.8    5.30  )-----------( 224      ( 02 May 2020 07:52 )             21.5                 PAST MEDICAL & SURGICAL HISTORY:  Knee pain, right  HLD (hyperlipidemia)  Former smoker  DVT of upper extremity (deep vein thrombosis)  Hepatitis C virus  GIB (gastrointestinal bleeding)  Ventricular fibrillation: s/p AICD  PAF (paroxysmal atrial fibrillation): on xarelto  Non-Ischemic Cardiomyopathy  SVT (Supraventricular Tachycardia)  HTN  CHF (Congestive Heart Failure)  S/P right heart catheterization: biopsy multiple  H/O heart transplant: 2/2018  Status post left hip replacement  History of Prior Ablation Treatment: for afib  AICD (Automatic Cardioverter/Defibrillator) Present: [employer] with 1 [employer] lead4/1/09- explanted and replaced with Medtronic 2 leads on 9/2/09 68 y/o M w/ h/o NICM s/p HM2 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 Per patient, has had chest pain intermittently since surgery but improves with tylenol prn (takes 1x/week) but this was more severe so contacted transplant coordinator. Also had checked temp at home and found to be 95. Denies any sick contacts, fevers/chills/NS/diarrhea. Due to concern of coronavirus infection was instructed to come to ER which he did via EMS. On arrival, VS were 98, , /94, 99% RA. Initial labs revealed leukopenia (with low lymphocyte), Hb 8.1 (lower than prior), Tbili 2.6 (prev 1.3 2/12). Reports pain is gone now. Abd US showed gallstone and sludge, without wall edema. Labs notable for lower Hb, and elevated TBili which is likely 2/2 cholelithiasis.  4/30 Covid PCR repeated. Pending GI/Gen. surgery consult appreciated. CT A/P with IV and PO contrast to further evaluate patients abdominal pain. Ck dimer, LDH, haptoglobin. IVF 75ml/hr x 6hrs prior to and post CT. Ck guiac  5/1 CTabd demonstrates Cholelithiasis without any findings to suggest acute cholecystitis or any biliary obstruction.  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 Per patient, has had chest pain intermittently since surgery but improves with tylenol prn (takes 1x/week) but this was more severe so contacted transplant coordinator. Also had checked temp at home and found to be 95. Denies any sick contacts, fevers/chills/NS/diarrhea. Due to concern of coronavirus infection was instructed to come to ER which he did via EMS. On arrival, VS were 98, , /94, 99% RA. Initial labs revealed leukopenia (with low lymphocyte), Hb 8.1 (lower than prior), Tbili 2.6 (prev 1.3 2/12). Reports pain is gone now. Abd US showed gallstone and sludge, without wall edema. Labs notable for lower Hb, and elevated TBili which is likely 2/2 cholelithiasis.  4/30 Covid PCR repeated. Pending GI/Gen. surgery consult appreciated. CT A/P with IV and PO contrast to further evaluate patients abdominal pain. Ck dimer, LDH, haptoglobin. IVF 75ml/hr x 6hrs prior to and post CT. Ck guiac  5/1 CTabd demonstrates Cholelithiasis without any findings to suggest acute cholecystitis or any biliary obstruction.  5/2 Transfuse 1uprbc h/h 6/21 Continue with prednisone for autoimmune hemolytic anemia and ISS for hyperglycemia. Increase Lokelma dosing for hyperkalemia 6.1

## 2020-05-02 NOTE — PROGRESS NOTE ADULT - SUBJECTIVE AND OBJECTIVE BOX
- no acute overnight events         Medications:  acetaminophen   Tablet .. 650 milliGRAM(s) Oral every 6 hours PRN  aspirin enteric coated 81 milliGRAM(s) Oral daily  atovaquone Suspension 1500 milliGRAM(s) Oral daily  insulin lispro (HumaLOG) corrective regimen sliding scale   SubCutaneous three times a day before meals  insulin lispro (HumaLOG) corrective regimen sliding scale   SubCutaneous at bedtime  magnesium oxide 400 milliGRAM(s) Oral daily  mycophenolate mofetil 500 milliGRAM(s) Oral two times a day  pravastatin 20 milliGRAM(s) Oral at bedtime  predniSONE   Tablet 75 milliGRAM(s) Oral daily  sodium bicarbonate 650 milliGRAM(s) Oral two times a day  sodium chloride 0.9% lock flush 3 milliLiter(s) IV Push every 8 hours  sodium chloride 0.9%. 1000 milliLiter(s) IV Continuous <Continuous>  sodium zirconium cyclosilicate 10 Gram(s) Oral <User Schedule>  sodium zirconium cyclosilicate 10 Gram(s) Oral two times a day  tacrolimus 5 milliGRAM(s) Oral <User Schedule>  valGANciclovir 450 milliGRAM(s) Oral daily      PAST MEDICAL & SURGICAL HISTORY:  Knee pain, right  HLD (hyperlipidemia)  Former smoker  DVT of upper extremity (deep vein thrombosis)  Hepatitis C virus  GIB (gastrointestinal bleeding)  Ventricular fibrillation: s/p AICD  PAF (paroxysmal atrial fibrillation): on xarelto  Non-Ischemic Cardiomyopathy  SVT (Supraventricular Tachycardia)  HTN  CHF (Congestive Heart Failure)  S/P right heart catheterization: biopsy multiple  H/O heart transplant: 2/2018  Status post left hip replacement  History of Prior Ablation Treatment: for afib  AICD (Automatic Cardioverter/Defibrillator) Present: [employer] with 1 [employer] lead4/1/09- explanted and replaced with Medtronic 2 leads on 9/2/09        Vitals:  T(F): 97.6 (05-02), Max: 98.2 (05-01)  HR: 94 (05-02) (94 - 120)  BP: 113/79 (05-02) (113/79 - 130/86)  RR: 18 (05-02)  SpO2: 99% (05-02)  I&O's Summary    01 May 2020 07:01  -  02 May 2020 07:00  --------------------------------------------------------  IN: 1110 mL / OUT: 800 mL / NET: 310 mL    02 May 2020 07:01  -  02 May 2020 12:41  --------------------------------------------------------  IN: 240 mL / OUT: 0 mL / NET: 240 mL        Physical Exam:  Appearance: No acute distress; well appearing  Eyes: PERRL, EOMI, pink conjunctiva  HENT: Normal oral mucosa  Cardiovascular: RRR, S1, S2, IV/VI systolic murmur w/ no rubs, or gallops; normal JVP  Respiratory: Clear to auscultation bilaterally  Gastrointestinal: soft, non-tender, non-distended with normal bowel sounds  Musculoskeletal: No clubbing; no joint deformity   Neurologic: Non-focal  Lymphatic: No lymphadenopathy  Psychiatry: AAOx3, mood & affect appropriate  Skin: No rashes, ecchymoses, or cyanosis                          6.8    5.30  )-----------( 224      ( 02 May 2020 07:52 )             21.5     05-02    133<L>  |  103  |  33<H>  ----------------------------<  206<H>  6.1<H>   |  19<L>  |  1.53<H>    Ca    8.7      02 May 2020 07:52    TPro  8.3  /  Alb  4.1  /  TBili  2.3<H>  /  DBili  0.3<H>  /  AST  25  /  ALT  6<L>  /  AlkPhos  79  05-02          Serum Pro-Brain Natriuretic Peptide: 522 pg/mL (04-29 @ 14:26)      Interpretation of Telemetry:  sinus rhythm

## 2020-05-03 DIAGNOSIS — R73.9 HYPERGLYCEMIA, UNSPECIFIED: ICD-10-CM

## 2020-05-03 LAB
ANION GAP SERPL CALC-SCNC: 13 MMOL/L — SIGNIFICANT CHANGE UP (ref 5–17)
BUN SERPL-MCNC: 40 MG/DL — HIGH (ref 7–23)
CALCIUM SERPL-MCNC: 8.7 MG/DL — SIGNIFICANT CHANGE UP (ref 8.4–10.5)
CHLORIDE SERPL-SCNC: 102 MMOL/L — SIGNIFICANT CHANGE UP (ref 96–108)
CO2 SERPL-SCNC: 20 MMOL/L — LOW (ref 22–31)
CREAT SERPL-MCNC: 1.59 MG/DL — HIGH (ref 0.5–1.3)
D DIMER BLD IA.RAPID-MCNC: 313 NG/ML DDU — HIGH
FIBRINOGEN PPP-MCNC: 437 MG/DL — SIGNIFICANT CHANGE UP (ref 350–510)
GLUCOSE BLDC GLUCOMTR-MCNC: 103 MG/DL — HIGH (ref 70–99)
GLUCOSE BLDC GLUCOMTR-MCNC: 132 MG/DL — HIGH (ref 70–99)
GLUCOSE BLDC GLUCOMTR-MCNC: 171 MG/DL — HIGH (ref 70–99)
GLUCOSE BLDC GLUCOMTR-MCNC: 236 MG/DL — HIGH (ref 70–99)
GLUCOSE SERPL-MCNC: 96 MG/DL — SIGNIFICANT CHANGE UP (ref 70–99)
HAPTOGLOB SERPL-MCNC: <20 MG/DL — LOW (ref 34–200)
HCT VFR BLD CALC: 27.8 % — LOW (ref 39–50)
HGB BLD-MCNC: 8.6 G/DL — LOW (ref 13–17)
LDH SERPL L TO P-CCNC: 364 U/L — HIGH (ref 50–242)
MCHC RBC-ENTMCNC: 30 PG — SIGNIFICANT CHANGE UP (ref 27–34)
MCHC RBC-ENTMCNC: 30.9 GM/DL — LOW (ref 32–36)
MCV RBC AUTO: 96.9 FL — SIGNIFICANT CHANGE UP (ref 80–100)
NRBC # BLD: 0 /100 WBCS — SIGNIFICANT CHANGE UP (ref 0–0)
PLATELET # BLD AUTO: 237 K/UL — SIGNIFICANT CHANGE UP (ref 150–400)
POTASSIUM SERPL-MCNC: 4.9 MMOL/L — SIGNIFICANT CHANGE UP (ref 3.5–5.3)
POTASSIUM SERPL-SCNC: 4.9 MMOL/L — SIGNIFICANT CHANGE UP (ref 3.5–5.3)
RBC # BLD: 2.87 M/UL — LOW (ref 4.2–5.8)
RBC # FLD: 16.3 % — HIGH (ref 10.3–14.5)
SODIUM SERPL-SCNC: 135 MMOL/L — SIGNIFICANT CHANGE UP (ref 135–145)
TACROLIMUS SERPL-MCNC: 6 NG/ML — SIGNIFICANT CHANGE UP
WBC # BLD: 5.71 K/UL — SIGNIFICANT CHANGE UP (ref 3.8–10.5)
WBC # FLD AUTO: 5.71 K/UL — SIGNIFICANT CHANGE UP (ref 3.8–10.5)

## 2020-05-03 PROCEDURE — 99233 SBSQ HOSP IP/OBS HIGH 50: CPT | Mod: GC

## 2020-05-03 PROCEDURE — 99232 SBSQ HOSP IP/OBS MODERATE 35: CPT

## 2020-05-03 RX ADMIN — SODIUM CHLORIDE 3 MILLILITER(S): 9 INJECTION INTRAMUSCULAR; INTRAVENOUS; SUBCUTANEOUS at 21:49

## 2020-05-03 RX ADMIN — SODIUM CHLORIDE 3 MILLILITER(S): 9 INJECTION INTRAMUSCULAR; INTRAVENOUS; SUBCUTANEOUS at 06:20

## 2020-05-03 RX ADMIN — MYCOPHENOLATE MOFETIL 500 MILLIGRAM(S): 250 CAPSULE ORAL at 08:09

## 2020-05-03 RX ADMIN — MAGNESIUM OXIDE 400 MG ORAL TABLET 400 MILLIGRAM(S): 241.3 TABLET ORAL at 13:10

## 2020-05-03 RX ADMIN — Medication 75 MILLIGRAM(S): at 06:12

## 2020-05-03 RX ADMIN — VALGANCICLOVIR 450 MILLIGRAM(S): 450 TABLET, FILM COATED ORAL at 13:10

## 2020-05-03 RX ADMIN — ATOVAQUONE 1500 MILLIGRAM(S): 750 SUSPENSION ORAL at 13:10

## 2020-05-03 RX ADMIN — Medication 81 MILLIGRAM(S): at 13:10

## 2020-05-03 RX ADMIN — Medication 650 MILLIGRAM(S): at 17:15

## 2020-05-03 RX ADMIN — MYCOPHENOLATE MOFETIL 500 MILLIGRAM(S): 250 CAPSULE ORAL at 17:15

## 2020-05-03 RX ADMIN — SODIUM ZIRCONIUM CYCLOSILICATE 10 GRAM(S): 10 POWDER, FOR SUSPENSION ORAL at 22:07

## 2020-05-03 RX ADMIN — Medication 20 MILLIGRAM(S): at 22:07

## 2020-05-03 RX ADMIN — TACROLIMUS 5 MILLIGRAM(S): 5 CAPSULE ORAL at 20:08

## 2020-05-03 RX ADMIN — Medication 4: at 17:15

## 2020-05-03 RX ADMIN — TACROLIMUS 5 MILLIGRAM(S): 5 CAPSULE ORAL at 08:09

## 2020-05-03 RX ADMIN — SODIUM CHLORIDE 3 MILLILITER(S): 9 INJECTION INTRAMUSCULAR; INTRAVENOUS; SUBCUTANEOUS at 13:14

## 2020-05-03 RX ADMIN — Medication 650 MILLIGRAM(S): at 06:11

## 2020-05-03 RX ADMIN — SODIUM ZIRCONIUM CYCLOSILICATE 10 GRAM(S): 10 POWDER, FOR SUSPENSION ORAL at 13:10

## 2020-05-03 NOTE — PROGRESS NOTE ADULT - PROBLEM SELECTOR PLAN 3
- cont insulin  - will need insulin administration counseling as he's going be on a high dose steroid regimen for a few months upon discharge

## 2020-05-03 NOTE — PROGRESS NOTE ADULT - SUBJECTIVE AND OBJECTIVE BOX
- no acute overnight events   - feels well this morning w/ no new/acute complaints.     Medications:  acetaminophen   Tablet .. 650 milliGRAM(s) Oral every 6 hours PRN  aspirin enteric coated 81 milliGRAM(s) Oral daily  atovaquone Suspension 1500 milliGRAM(s) Oral daily  insulin lispro (HumaLOG) corrective regimen sliding scale   SubCutaneous three times a day before meals  insulin lispro (HumaLOG) corrective regimen sliding scale   SubCutaneous at bedtime  magnesium oxide 400 milliGRAM(s) Oral daily  mycophenolate mofetil 500 milliGRAM(s) Oral two times a day  pravastatin 20 milliGRAM(s) Oral at bedtime  predniSONE   Tablet 75 milliGRAM(s) Oral daily  sodium bicarbonate 650 milliGRAM(s) Oral two times a day  sodium chloride 0.9% lock flush 3 milliLiter(s) IV Push every 8 hours  sodium chloride 0.9%. 1000 milliLiter(s) IV Continuous <Continuous>  sodium zirconium cyclosilicate 10 Gram(s) Oral <User Schedule>  sodium zirconium cyclosilicate 10 Gram(s) Oral <User Schedule>  tacrolimus 5 milliGRAM(s) Oral <User Schedule>  valGANciclovir 450 milliGRAM(s) Oral daily      PAST MEDICAL & SURGICAL HISTORY:  Knee pain, right  HLD (hyperlipidemia)  Former smoker  DVT of upper extremity (deep vein thrombosis)  Hepatitis C virus  GIB (gastrointestinal bleeding)  Ventricular fibrillation: s/p AICD  PAF (paroxysmal atrial fibrillation): on xarelto  Non-Ischemic Cardiomyopathy  SVT (Supraventricular Tachycardia)  HTN  CHF (Congestive Heart Failure)  S/P right heart catheterization: biopsy multiple  H/O heart transplant: 2/2018  Status post left hip replacement  History of Prior Ablation Treatment: for afib  AICD (Automatic Cardioverter/Defibrillator) Present: [employer] with 1 [employer] lead4/1/09- explanted and replaced with Medtronic 2 leads on 9/2/09        Vitals:  T(F): 97.5 (05-03), Max: 98.3 (05-02)  HR: 110 (05-03) (94 - 122)  BP: 111/73 (05-03) (105/73 - 128/85)  RR: 18 (05-03)  SpO2: 99% (05-03)  I&O's Summary    02 May 2020 07:01  -  03 May 2020 07:00  --------------------------------------------------------  IN: 1450 mL / OUT: 1700 mL / NET: -250 mL    03 May 2020 07:01  -  03 May 2020 12:36  --------------------------------------------------------  IN: 360 mL / OUT: 0 mL / NET: 360 mL        Physical Exam:  Appearance: No acute distress; well appearing  Eyes: PERRL, EOMI, pink conjunctiva  HENT: Normal oral mucosa  Cardiovascular: RRR, S1, S2, IV/VI systolic murmur w/ no rubs, or gallops; normal JVP  Respiratory: Clear to auscultation bilaterally  Gastrointestinal: soft, non-tender, non-distended with normal bowel sounds  Musculoskeletal: No clubbing; no joint deformity   Neurologic: Non-focal  Lymphatic: No lymphadenopathy  Psychiatry: AAOx3, mood & affect appropriate  Skin: No rashes, ecchymoses, or cyanosis                          8.6    5.71  )-----------( 237      ( 03 May 2020 07:51 )             27.8     05-03    135  |  102  |  40<H>  ----------------------------<  96  4.9   |  20<L>  |  1.59<H>    Ca    8.7      03 May 2020 07:51    TPro  8.3  /  Alb  4.1  /  TBili  2.3<H>  /  DBili  0.3<H>  /  AST  25  /  ALT  6<L>  /  AlkPhos  79  05-02          Serum Pro-Brain Natriuretic Peptide: 522 pg/mL (04-29 @ 14:26)        Interpretation of Telemetry:  sinus rhythm.

## 2020-05-03 NOTE — PROGRESS NOTE ADULT - PROBLEM SELECTOR PLAN 1
- s/p RHC w/ biospy 2/20 w/ normal hemodynamics and neg for rejection  - cont tacrolimus 5 mg BID   - cont atovaquone for PCP prophylaxis   - cont valcyte for CMV prophylaxis   - on high dose steroids due to recent hemolytic anemia  - cont cellcept 500 mg BID - s/p RHC w/ biospy 2/20 w/ normal hemodynamics and neg for rejection  - on tacrolimus 5 mg BID; tac at goal 4-6  - cont atovaquone for PCP prophylaxis   - cont valcyte for CMV prophylaxis   - on high dose steroids due to recent hemolytic anemia  - cont cellcept 500 mg BID  - resumed on OI PPx; on mepron currently but will switch to Bactrim if K is controlled

## 2020-05-03 NOTE — PROGRESS NOTE ADULT - SUBJECTIVE AND OBJECTIVE BOX
VITAL SIGNS    Telemetry: SR ST   Vital Signs Last 24 Hrs  T(C): 36.4 (05-03-20 @ 05:05), Max: 36.8 (05-02-20 @ 13:39)  T(F): 97.5 (05-03-20 @ 05:05), Max: 98.3 (05-02-20 @ 13:39)  HR: 110 (05-03-20 @ 07:10) (94 - 122)  BP: 111/73 (05-03-20 @ 05:05) (105/73 - 128/85)  RR: 18 (05-03-20 @ 05:05) (18 - 18)  SpO2: 99% (05-03-20 @ 05:05) (98% - 99%)            05-02 @ 07:01  -  05-03 @ 07:00  --------------------------------------------------------  IN: 1450 mL / OUT: 1700 mL / NET: -250 mL       Daily     Daily   Admit Wt: Drug Dosing Weight  Height (cm): 172.7 (29 Apr 2020 23:00)  Weight (kg): 74.8 (29 Apr 2020 23:00)  BMI (kg/m2): 25.1 (29 Apr 2020 23:00)  BSA (m2): 1.88 (29 Apr 2020 23:00)      CAPILLARY BLOOD GLUCOSE      POCT Blood Glucose.: 103 mg/dL (03 May 2020 08:09)  POCT Blood Glucose.: 117 mg/dL (02 May 2020 21:37)  POCT Blood Glucose.: 121 mg/dL (02 May 2020 16:52)  POCT Blood Glucose.: 153 mg/dL (02 May 2020 11:54)          MEDICATIONS  acetaminophen   Tablet .. 650 milliGRAM(s) Oral every 6 hours PRN  aspirin enteric coated 81 milliGRAM(s) Oral daily  atovaquone Suspension 1500 milliGRAM(s) Oral daily  insulin lispro (HumaLOG) corrective regimen sliding scale   SubCutaneous three times a day before meals  insulin lispro (HumaLOG) corrective regimen sliding scale   SubCutaneous at bedtime  magnesium oxide 400 milliGRAM(s) Oral daily  mycophenolate mofetil 500 milliGRAM(s) Oral two times a day  pravastatin 20 milliGRAM(s) Oral at bedtime  predniSONE   Tablet 75 milliGRAM(s) Oral daily  sodium bicarbonate 650 milliGRAM(s) Oral two times a day  sodium chloride 0.9% lock flush 3 milliLiter(s) IV Push every 8 hours  sodium chloride 0.9%. 1000 milliLiter(s) IV Continuous <Continuous>  sodium zirconium cyclosilicate 10 Gram(s) Oral <User Schedule>  sodium zirconium cyclosilicate 10 Gram(s) Oral <User Schedule>  tacrolimus 5 milliGRAM(s) Oral <User Schedule>  valGANciclovir 450 milliGRAM(s) Oral daily      >>> <<<  PHYSICAL EXAM  Subjective: NAD OOB to chair, Denies abd pain and cp  Neurology: alert and oriented x 3, nonfocal, no gross deficits  CV : s1s2  Lungs: CTA b/l  Abdomen: soft, NT,ND, (+ )BM  Extremities:  -c/c/e     LABS  05-03    135  |  102  |  40<H>  ----------------------------<  96  4.9   |  20<L>  |  1.59<H>    Ca    8.7      03 May 2020 07:51    TPro  8.3  /  Alb  4.1  /  TBili  2.3<H>  /  DBili  0.3<H>  /  AST  25  /  ALT  6<L>  /  AlkPhos  79  05-02                                 8.6    5.71  )-----------( 237      ( 03 May 2020 07:51 )             27.8                 PAST MEDICAL & SURGICAL HISTORY:  Knee pain, right  HLD (hyperlipidemia)  Former smoker  DVT of upper extremity (deep vein thrombosis)  Hepatitis C virus  GIB (gastrointestinal bleeding)  Ventricular fibrillation: s/p AICD  PAF (paroxysmal atrial fibrillation): on xarelto  Non-Ischemic Cardiomyopathy  SVT (Supraventricular Tachycardia)  HTN  CHF (Congestive Heart Failure)  S/P right heart catheterization: biopsy multiple  H/O heart transplant: 2/2018  Status post left hip replacement  History of Prior Ablation Treatment: for afib  AICD (Automatic Cardioverter/Defibrillator) Present: [employer] with 1 [employer] lead4/1/09- explanted and replaced with [employer]tronic 2 leads on 9/2/09

## 2020-05-03 NOTE — PROGRESS NOTE ADULT - ASSESSMENT
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 Per patient, has had chest pain intermittently since surgery but improves with tylenol prn (takes 1x/week) but this was more severe so contacted transplant coordinator. Also had checked temp at home and found to be 95. Denies any sick contacts, fevers/chills/NS/diarrhea. Due to concern of coronavirus infection was instructed to come to ER which he did via EMS. On arrival, VS were 98, , /94, 99% RA. Initial labs revealed leukopenia (with low lymphocyte), Hb 8.1 (lower than prior), Tbili 2.6 (prev 1.3 2/12). Reports pain is gone now. Abd US showed gallstone and sludge, without wall edema. Labs notable for lower Hb, and elevated TBili which is likely 2/2 cholelithiasis.  4/30 Covid PCR repeated. Pending GI/Gen. surgery consult appreciated. CT A/P with IV and PO contrast to further evaluate patients abdominal pain. Ck dimer, LDH, haptoglobin. IVF 75ml/hr x 6hrs prior to and post CT. Ck guiac  5/1 CTabd demonstrates Cholelithiasis without any findings to suggest acute cholecystitis or any biliary obstruction.  5/2 Transfuse 1uprbc h/h 6/21 Continue with prednisone for autoimmune hemolytic anemia and ISS for hyperglycemia. Increase Lokelma dosing for hyperkalemia 6.1    5/3 H/H stable. Hyperkalemia resolved. Continue with present management.

## 2020-05-04 LAB
ANION GAP SERPL CALC-SCNC: 14 MMOL/L — SIGNIFICANT CHANGE UP (ref 5–17)
BUN SERPL-MCNC: 38 MG/DL — HIGH (ref 7–23)
CALCIUM SERPL-MCNC: 8.8 MG/DL — SIGNIFICANT CHANGE UP (ref 8.4–10.5)
CHLORIDE SERPL-SCNC: 102 MMOL/L — SIGNIFICANT CHANGE UP (ref 96–108)
CMV DNA CSF QL NAA+PROBE: SIGNIFICANT CHANGE UP
CMV DNA SPEC NAA+PROBE-LOG#: ABNORMAL LOG10IU/ML
CO2 SERPL-SCNC: 20 MMOL/L — LOW (ref 22–31)
CREAT SERPL-MCNC: 1.43 MG/DL — HIGH (ref 0.5–1.3)
GLUCOSE BLDC GLUCOMTR-MCNC: 114 MG/DL — HIGH (ref 70–99)
GLUCOSE BLDC GLUCOMTR-MCNC: 121 MG/DL — HIGH (ref 70–99)
GLUCOSE BLDC GLUCOMTR-MCNC: 147 MG/DL — HIGH (ref 70–99)
GLUCOSE BLDC GLUCOMTR-MCNC: 160 MG/DL — HIGH (ref 70–99)
GLUCOSE SERPL-MCNC: 108 MG/DL — HIGH (ref 70–99)
HBV DNA # SERPL NAA+PROBE: SIGNIFICANT CHANGE UP IU/ML
HBV DNA SERPL NAA+PROBE-LOG#: SIGNIFICANT CHANGE UP LOG10IU/ML
HCT VFR BLD CALC: 26.7 % — LOW (ref 39–50)
HGB BLD-MCNC: 8.2 G/DL — LOW (ref 13–17)
MCHC RBC-ENTMCNC: 29.9 PG — SIGNIFICANT CHANGE UP (ref 27–34)
MCHC RBC-ENTMCNC: 30.7 GM/DL — LOW (ref 32–36)
MCV RBC AUTO: 97.4 FL — SIGNIFICANT CHANGE UP (ref 80–100)
NRBC # BLD: 0 /100 WBCS — SIGNIFICANT CHANGE UP (ref 0–0)
PLATELET # BLD AUTO: 256 K/UL — SIGNIFICANT CHANGE UP (ref 150–400)
POTASSIUM SERPL-MCNC: 4.7 MMOL/L — SIGNIFICANT CHANGE UP (ref 3.5–5.3)
POTASSIUM SERPL-SCNC: 4.7 MMOL/L — SIGNIFICANT CHANGE UP (ref 3.5–5.3)
RBC # BLD: 2.74 M/UL — LOW (ref 4.2–5.8)
RBC # FLD: 16.5 % — HIGH (ref 10.3–14.5)
SODIUM SERPL-SCNC: 136 MMOL/L — SIGNIFICANT CHANGE UP (ref 135–145)
TACROLIMUS SERPL-MCNC: 6.1 NG/ML — SIGNIFICANT CHANGE UP
WBC # BLD: 9.21 K/UL — SIGNIFICANT CHANGE UP (ref 3.8–10.5)
WBC # FLD AUTO: 9.21 K/UL — SIGNIFICANT CHANGE UP (ref 3.8–10.5)

## 2020-05-04 PROCEDURE — 99232 SBSQ HOSP IP/OBS MODERATE 35: CPT

## 2020-05-04 PROCEDURE — 99233 SBSQ HOSP IP/OBS HIGH 50: CPT

## 2020-05-04 PROCEDURE — 99232 SBSQ HOSP IP/OBS MODERATE 35: CPT | Mod: GC

## 2020-05-04 PROCEDURE — 99231 SBSQ HOSP IP/OBS SF/LOW 25: CPT

## 2020-05-04 RX ORDER — NYSTATIN 500MM UNIT
500000 POWDER (EA) MISCELLANEOUS
Refills: 0 | Status: DISCONTINUED | OUTPATIENT
Start: 2020-05-04 | End: 2020-05-07

## 2020-05-04 RX ORDER — FOLIC ACID 0.8 MG
1 TABLET ORAL DAILY
Refills: 0 | Status: DISCONTINUED | OUTPATIENT
Start: 2020-05-04 | End: 2020-05-07

## 2020-05-04 RX ADMIN — Medication 1 TABLET(S): at 17:02

## 2020-05-04 RX ADMIN — Medication 2: at 17:02

## 2020-05-04 RX ADMIN — TACROLIMUS 5 MILLIGRAM(S): 5 CAPSULE ORAL at 08:15

## 2020-05-04 RX ADMIN — Medication 650 MILLIGRAM(S): at 17:02

## 2020-05-04 RX ADMIN — Medication 75 MILLIGRAM(S): at 05:28

## 2020-05-04 RX ADMIN — MAGNESIUM OXIDE 400 MG ORAL TABLET 400 MILLIGRAM(S): 241.3 TABLET ORAL at 11:56

## 2020-05-04 RX ADMIN — SODIUM CHLORIDE 3 MILLILITER(S): 9 INJECTION INTRAMUSCULAR; INTRAVENOUS; SUBCUTANEOUS at 15:07

## 2020-05-04 RX ADMIN — Medication 20 MILLIGRAM(S): at 22:21

## 2020-05-04 RX ADMIN — SODIUM CHLORIDE 3 MILLILITER(S): 9 INJECTION INTRAMUSCULAR; INTRAVENOUS; SUBCUTANEOUS at 22:18

## 2020-05-04 RX ADMIN — Medication 650 MILLIGRAM(S): at 05:28

## 2020-05-04 RX ADMIN — ATOVAQUONE 1500 MILLIGRAM(S): 750 SUSPENSION ORAL at 11:56

## 2020-05-04 RX ADMIN — TACROLIMUS 5 MILLIGRAM(S): 5 CAPSULE ORAL at 20:25

## 2020-05-04 RX ADMIN — MYCOPHENOLATE MOFETIL 500 MILLIGRAM(S): 250 CAPSULE ORAL at 05:28

## 2020-05-04 RX ADMIN — Medication 81 MILLIGRAM(S): at 11:56

## 2020-05-04 RX ADMIN — SODIUM CHLORIDE 3 MILLILITER(S): 9 INJECTION INTRAMUSCULAR; INTRAVENOUS; SUBCUTANEOUS at 05:30

## 2020-05-04 RX ADMIN — Medication 500000 UNIT(S): at 20:25

## 2020-05-04 RX ADMIN — MYCOPHENOLATE MOFETIL 500 MILLIGRAM(S): 250 CAPSULE ORAL at 17:02

## 2020-05-04 RX ADMIN — SODIUM ZIRCONIUM CYCLOSILICATE 10 GRAM(S): 10 POWDER, FOR SUSPENSION ORAL at 15:09

## 2020-05-04 RX ADMIN — SODIUM ZIRCONIUM CYCLOSILICATE 10 GRAM(S): 10 POWDER, FOR SUSPENSION ORAL at 22:20

## 2020-05-04 RX ADMIN — VALGANCICLOVIR 450 MILLIGRAM(S): 450 TABLET, FILM COATED ORAL at 11:56

## 2020-05-04 NOTE — PROGRESS NOTE ADULT - ASSESSMENT
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 Brought in for concerns of COVID infection which returned negative. 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  Currently being treated with high dose of prednisone and waiting for various viral PCR's to determine the etiology. There had been case reports of hemolytic anemia secondary to calcineurin inhibitors and various viruses including CMV, Parvovirus B 19, HHV, EBV etc. For now we'll check viral PCR's. .    BP controlled and euvolemic on exam.    Kely Jha D.O.  Adv. 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 Brought in for concerns of COVID infection which returned negative. 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  Currently being treated with high dose of prednisone and waiting for various viral PCR's to determine the etiology. There had been case reports of hemolytic anemia secondary to calcineurin inhibitors and various viruses including CMV, Parvovirus B 19, HHV, EBV etc. For now we'll check viral PCR's. I've discussed his case with hematology. Suspicion for post-transplant lymphoproliferative disorder is low given no prominent lymph notes on CT scan.    BP controlled and euvolemic on exam.    Kely Jha D.O.  Adv. HF and transplant fellow  [phone]

## 2020-05-04 NOTE — PROGRESS NOTE ADULT - SUBJECTIVE AND OBJECTIVE BOX
INFECTIOUS DISEASES FOLLOW UP-- Sujey Lujan  [phone]    This is a follow up note for this  70yMale with  Chest pain s/p OHTx  admitted with hemolytic anemia and hypothermia      ROS:  CONSTITUTIONAL:  No fever, good appetite  CARDIOVASCULAR:  No chest pain or palpitations  RESPIRATORY:  No dyspnea  GASTROINTESTINAL:  No nausea, vomiting, diarrhea, occasional pain RUQ abdominal pain  GENITOURINARY:  No dysuria  NEUROLOGIC:  No headache,     Allergies    No Known Allergies    Intolerances        ANTIBIOTICS/RELEVANT:  antimicrobials  nystatin    Suspension [id number] Unit(s) Oral <User Schedule>  trimethoprim  160 mG/sulfamethoxazole 800 mG 1 Tablet(s) Oral daily  valGANciclovir 450 milliGRAM(s) Oral daily    immunologic:  mycophenolate mofetil 500 milliGRAM(s) Oral two times a day  tacrolimus 5 milliGRAM(s) Oral <User Schedule>    OTHER:  acetaminophen   Tablet .. 650 milliGRAM(s) Oral every 6 hours PRN  aspirin enteric coated 81 milliGRAM(s) Oral daily  folic acid 1 milliGRAM(s) Oral daily  insulin lispro (HumaLOG) corrective regimen sliding scale   SubCutaneous three times a day before meals  insulin lispro (HumaLOG) corrective regimen sliding scale   SubCutaneous at bedtime  magnesium oxide 400 milliGRAM(s) Oral daily  pravastatin 20 milliGRAM(s) Oral at bedtime  predniSONE   Tablet 75 milliGRAM(s) Oral daily  sodium bicarbonate 650 milliGRAM(s) Oral two times a day  sodium chloride 0.9% lock flush 3 milliLiter(s) IV Push every 8 hours  sodium chloride 0.9%. 1000 milliLiter(s) IV Continuous <Continuous>  sodium zirconium cyclosilicate 10 Gram(s) Oral <User Schedule>  sodium zirconium cyclosilicate 10 Gram(s) Oral <User Schedule>      Objective:  Vital Signs Last 24 Hrs  T(C): 36.3 (04 May 2020 12:39), Max: 36.8 (03 May 2020 19:00)  T(F): 97.4 (04 May 2020 12:39), Max: 98.2 (03 May 2020 19:00)  HR: 115 (04 May 2020 12:39) (106 - 116)  BP: 137/91 (04 May 2020 12:39) (120/75 - 137/91)  BP(mean): --  RR: 18 (04 May 2020 12:39) (18 - 18)  SpO2: 99% (04 May 2020 12:39) (98% - 99%)    PHYSICAL EXAM:  Constitutional:no acute distress  Eyes:WALT, EOMI  Ear/Nose/Throat: no oral lesions, 	  Respiratory: clear BL  Cardiovascular: S1S2 sternotomy healed  Gastrointestinal:soft, (+) BS, minimal  tenderness RUQ  Extremities:no e/e/c  No Lymphadenopathy  IV sites not inflammed.    LABS:                        8.2    9.21  )-----------( 256      ( 04 May 2020 08:24 )             26.7     05-04    136  |  102  |  38<H>  ----------------------------<  108<H>  4.7   |  20<L>  |  1.43<H>    Ca    8.8      04 May 2020 08:24            MICROBIOLOGY:    HIV-1/2 Combo Result: Nonreact: The HIV Ag/Ab Combo test performed screens for HIV-1 p24 antigen,  antibodies to HIV-1 (group M and group O), and antibodies to HIV-2. All  specimens repeatedly reactive will reflex to an HIV 1/2 antibody  confirmation and differentiation test. This assay detects p24 antigen  which may be present prior to the development of HIV antibodies,  therefore a reactive result with a negative HIV 1/2 AB Confirmation  should be followed up with HIV-1 RNA, HIV-2 RNA and repeat testing in 4-8  weeks. A nonreactive result does not preclude previous exposure to or  infection with HIV-1 or HIV-2. (05.02.20 @ 11:28)    Hepatitis B PCR Quantitative: NotDetec IU/mL (05.02.20 @ 08:59)            RECENT CULTURES:  Cytomegalovirus By PCR:   Det <50 (02.12.20 @ 10:21)        RADIOLOGY & ADDITIONAL STUDIES:    < from: CT Abdomen and Pelvis w/ Oral Cont and w/ IV Cont (04.30.20 @ 19:32) >  IMPRESSION:     Cholelithiasis without any findings to suggest acute cholecystitis or any biliary obstruction.    Peristalsis versus mural thickening gastric antrum.    Additional findings as above.    < end of copied text >

## 2020-05-04 NOTE — PROGRESS NOTE ADULT - PROBLEM SELECTOR PLAN 3
Transplant team following   Continue immunosuppression with tacrolimus/cellcept - dosed per transplant team   Continue prophylaxis with Mepron and Valcyte - dosed per transplant team

## 2020-05-04 NOTE — PROGRESS NOTE ADULT - ASSESSMENT
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 Unclear etiology, suspecting Cholelithiasis      Abdominal pain    Gall stone seen on CT and US but without evidence of acute cholecystitis  Will likely need gall bladder removal in the future  Not definite source of his pain  Also noted was the gastric antrum finding/thickening? and when able to perform- he should undergo an endoscopy      Hemolytic anemia    Evaluated by Hematology - Jacky shows IgG Ab+. Evekated tara, LDH, and low hapto. Smear shows no schistocytes and + microspherocytes. Not TTP/HUS. Consistent with hemolytic anemia.     will check HCV viral load- pending  Hep B serologies- HBV viral load is non detectable  HIV ab non reactive  CMV PCR- pending  parvovirus - pending  EBV PCR- pending    OI prophylaxis  Valganciclovir 450mg/day  Bactrim DS daily      Gary Lujan MD  [phone]  After 5pm/weekends [phone]

## 2020-05-04 NOTE — PROGRESS NOTE ADULT - SUBJECTIVE AND OBJECTIVE BOX
INTERVAL HPI/OVERNIGHT EVENTS:  Patient S&E at bedside.   Afebrile.      VITAL SIGNS:  T(F): 97.8 (05-04-20 @ 05:05)  HR: 106 (05-04-20 @ 05:05)  BP: 132/87 (05-04-20 @ 05:05)  RR: 18 (05-04-20 @ 05:05)  SpO2: 99% (05-04-20 @ 05:05)      PHYSICAL EXAM:  Constitutional: NAD  Eyes: EOMI, sclera non-icteric  Neck: supple  Respiratory: normal respiratory effort   Cardiovascular: RRR  Gastrointestinal: soft, NTND, + BS  Extremities: no cyanosis, clubbing or edema   Neurological: awake and alert      MEDICATIONS  (STANDING):  aspirin enteric coated 81 milliGRAM(s) Oral daily  atovaquone Suspension 1500 milliGRAM(s) Oral daily  insulin lispro (HumaLOG) corrective regimen sliding scale   SubCutaneous three times a day before meals  insulin lispro (HumaLOG) corrective regimen sliding scale   SubCutaneous at bedtime  magnesium oxide 400 milliGRAM(s) Oral daily  mycophenolate mofetil 500 milliGRAM(s) Oral two times a day  pravastatin 20 milliGRAM(s) Oral at bedtime  predniSONE   Tablet 75 milliGRAM(s) Oral daily  sodium bicarbonate 650 milliGRAM(s) Oral two times a day  sodium chloride 0.9% lock flush 3 milliLiter(s) IV Push every 8 hours  sodium chloride 0.9%. 1000 milliLiter(s) (75 mL/Hr) IV Continuous <Continuous>  sodium zirconium cyclosilicate 10 Gram(s) Oral <User Schedule>  sodium zirconium cyclosilicate 10 Gram(s) Oral <User Schedule>  tacrolimus 5 milliGRAM(s) Oral <User Schedule>  valGANciclovir 450 milliGRAM(s) Oral daily    MEDICATIONS  (PRN):  acetaminophen   Tablet .. 650 milliGRAM(s) Oral every 6 hours PRN Mild Pain (1 - 3)      Allergies  No Known Allergies        LABS:                        8.2    9.21  )-----------( 256      ( 04 May 2020 08:24 )             26.7     05-04    136  |  102  |  38<H>  ----------------------------<  108<H>  4.7   |  20<L>  |  1.43<H>    Ca    8.8      04 May 2020 08:24        RADIOLOGY & ADDITIONAL TESTS:  Studies reviewed.

## 2020-05-04 NOTE — PROGRESS NOTE ADULT - SUBJECTIVE AND OBJECTIVE BOX
Chief Complaint:  Patient is a 70y old  Male who presents with a chief complaint of chest pain (03 May 2020 12:34)    Interval Events:   No acute overnight events  Still reports abdominal tightness, but improved  No nausea, vomiting, diarrhea    Allergies:  No Known Allergies    Hospital Medications:  acetaminophen   Tablet .. 650 milliGRAM(s) Oral every 6 hours PRN  aspirin enteric coated 81 milliGRAM(s) Oral daily  atovaquone Suspension 1500 milliGRAM(s) Oral daily  insulin lispro (HumaLOG) corrective regimen sliding scale   SubCutaneous three times a day before meals  insulin lispro (HumaLOG) corrective regimen sliding scale   SubCutaneous at bedtime  magnesium oxide 400 milliGRAM(s) Oral daily  mycophenolate mofetil 500 milliGRAM(s) Oral two times a day  pravastatin 20 milliGRAM(s) Oral at bedtime  predniSONE   Tablet 75 milliGRAM(s) Oral daily  sodium bicarbonate 650 milliGRAM(s) Oral two times a day  sodium chloride 0.9% lock flush 3 milliLiter(s) IV Push every 8 hours  sodium chloride 0.9%. 1000 milliLiter(s) IV Continuous <Continuous>  sodium zirconium cyclosilicate 10 Gram(s) Oral <User Schedule>  sodium zirconium cyclosilicate 10 Gram(s) Oral <User Schedule>  tacrolimus 5 milliGRAM(s) Oral <User Schedule>  valGANciclovir 450 milliGRAM(s) Oral daily    PMHX/PSHX:  Knee pain, right  HLD (hyperlipidemia)  Former smoker  DVT of upper extremity (deep vein thrombosis)  Hepatitis C virus  GIB (gastrointestinal bleeding)  H/O prior ablation treatment  Ventricular fibrillation  PAF (paroxysmal atrial fibrillation)  Non-Ischemic Cardiomyopathy  SVT (Supraventricular Tachycardia)  HTN  CHF (Congestive Heart Failure)  S/P right heart catheterization  H/O heart transplant  LVAD (left ventricular assist device) present  Status post left hip replacement  Hypertension  Hypertension  History of Prior Ablation Treatment  AICD (Automatic Cardioverter/Defibrillator) Present    ROS:   General:  No fevers, chills or night sweats  ENT:  No sore throat or dysphagia  CV:  No pain or palpitations  Resp:  No dyspnea, cough or  wheezing  GI:  No pain, No nausea, No vomiting, No diarrhea, No rectal bleeding, No tarry stools,  Skin:  No rash or edema    PHYSICAL EXAM:   Vital Signs:  Vital Signs Last 24 Hrs  T(C): 36.6 (04 May 2020 05:05), Max: 36.8 (03 May 2020 14:05)  T(F): 97.8 (04 May 2020 05:05), Max: 98.2 (03 May 2020 14:05)  HR: 106 (04 May 2020 05:05) (106 - 116)  BP: 132/87 (04 May 2020 05:05) (120/75 - 132/87)  BP(mean): --  RR: 18 (04 May 2020 05:05) (18 - 18)  SpO2: 99% (04 May 2020 05:05) (98% - 99%)  Daily     Daily Weight in k (04 May 2020 08:53)    GENERAL:  NAD, Appears stated age  HEENT:  NC/AT,  conjunctivae clear and pink, sclera -anicteric  CHEST:  Normal Effort, Breath sounds clear  HEART:  RRR, S1 + S2, no murmurs  ABDOMEN:  Soft, non-tender, non-distended, BS+  EXTEREMITIES:  no cyanosis  SKIN:  Warm & Dry.  NEURO:  Alert, oriented    LABS:                        8.2    9.21  )-----------( 256      ( 04 May 2020 08:24 )             26.7     Mean Cell Volume: 97.4 fl (20 @ 08:24)    05-    136  |  102  |  38<H>  ----------------------------<  108<H>  4.7   |  20<L>  |  1.43<H>    Ca    8.8      04 May 2020 08:24                         8.2    9.21  )-----------( 256      ( 04 May 2020 08:24 )             26.7                         8.6    5.71  )-----------( 237      ( 03 May 2020 07:51 )             27.8                         6.8    5.30  )-----------( 224      ( 02 May 2020 07:52 )             21.5                         7.4    4.88  )-----------( 241      ( 01 May 2020 11:15 )             24.3       Imaging:

## 2020-05-04 NOTE — PROGRESS NOTE ADULT - SUBJECTIVE AND OBJECTIVE BOX
Subjective: no acute overnight events. States that his chest pain and abdominal pain have subsided.    Tele: ST 's               Current Meds:  acetaminophen   Tablet .. 650 milliGRAM(s) Oral every 6 hours PRN  aspirin enteric coated 81 milliGRAM(s) Oral daily  folic acid 1 milliGRAM(s) Oral daily  insulin lispro (HumaLOG) corrective regimen sliding scale   SubCutaneous three times a day before meals  insulin lispro (HumaLOG) corrective regimen sliding scale   SubCutaneous at bedtime  magnesium oxide 400 milliGRAM(s) Oral daily  mycophenolate mofetil 500 milliGRAM(s) Oral two times a day  pravastatin 20 milliGRAM(s) Oral at bedtime  predniSONE   Tablet 75 milliGRAM(s) Oral daily  sodium bicarbonate 650 milliGRAM(s) Oral two times a day  sodium chloride 0.9% lock flush 3 milliLiter(s) IV Push every 8 hours  sodium chloride 0.9%. 1000 milliLiter(s) IV Continuous <Continuous>  sodium zirconium cyclosilicate 10 Gram(s) Oral <User Schedule>  sodium zirconium cyclosilicate 10 Gram(s) Oral <User Schedule>  tacrolimus 5 milliGRAM(s) Oral <User Schedule>  trimethoprim  160 mG/sulfamethoxazole 800 mG 1 Tablet(s) Oral daily  valGANciclovir 450 milliGRAM(s) Oral daily      Vitals:  T(C): 36.3 (05-04-20 @ 12:39), Max: 36.8 (05-03-20 @ 19:00)  T(F): 97.4 (05-04-20 @ 12:39), Max: 98.2 (05-03-20 @ 19:00)  HR: 115 (05-04-20 @ 12:39) (106 - 116)  BP: 137/91 (05-04-20 @ 12:39) (120/75 - 137/91)  RR: 18 (05-04-20 @ 12:39) (18 - 18)  SpO2: 99% (05-04-20 @ 12:39) (98% - 99%)      I&O's Summary    03 May 2020 07:01  -  04 May 2020 07:00  --------------------------------------------------------  IN: 840 mL / OUT: 1250 mL / NET: -410 mL    04 May 2020 07:01  -  04 May 2020 14:47  --------------------------------------------------------  IN: 780 mL / OUT: 1400 mL / NET: -620 mL      Physical Exam:  Appearance: No acute distress; well appearing  Eyes: pink conjunctiva  HENT: Normal oral mucosa  Cardiovascular: RRR, S1, S2, IV/VI systolic murmur w/ no rubs, or gallops; normal JVP  Respiratory: Clear to auscultation bilaterally  Gastrointestinal: soft, non-tender, non-distended with normal bowel sounds  Musculoskeletal: No clubbing; no joint deformity   Neurologic: Non-focal  Psychiatry: AAOx3, mood & affect appropriate  Skin: No rashes, ecchymoses, or cyanosis                                   8.2    9.21  )-----------( 256      ( 04 May 2020 08:24 )             26.7     05-04    136  |  102  |  38<H>  ----------------------------<  108<H>  4.7   |  20<L>  |  1.43<H>    Ca    8.8      04 May 2020 08:24        CARDIAC MARKERS ( 29 Apr 2020 17:52 )  40 ng/L / x     / x     / x     / x     / x      CARDIAC MARKERS ( 29 Apr 2020 14:26 )  36 ng/L / x     / x     / 115 U/L / x     / 2.0 ng/mL      Serum Pro-Brain Natriuretic Peptide: 522 pg/mL (04-29 @ 14:26)          tacrolimus   5 milliGRAM(s) Oral (05-04-20 @ 08:15)   5 milliGRAM(s) Oral (05-03-20 @ 20:08)   5 milliGRAM(s) Oral (05-03-20 @ 08:09)   5 milliGRAM(s) Oral (05-02-20 @ 19:57)

## 2020-05-04 NOTE — PROGRESS NOTE ADULT - PROBLEM SELECTOR PLAN 1
- s/p RHC w/ biospy 2/20 w/ normal hemodynamics and neg for rejection  - on tacrolimus 5 mg BID; tac at goal 4-6  - cont cellcept 500 mg BID  - on high dose steroids due to recent hemolytic anemia  - cont atovaquone for PCP prophylaxis   - cont valcyte for CMV prophylaxis  - Stop mepron   - Start Bactrim DS daily given history of nocardia infection (also PJP prophylaxis); K had been stable on Lokelma.

## 2020-05-04 NOTE — PROGRESS NOTE ADULT - ASSESSMENT
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 Per patient, has had chest pain intermittently since surgery but improves with tylenol prn (takes 1x/week) but this was more severe so contacted transplant coordinator. Also had checked temp at home and found to be 95. Denies any sick contacts, fevers/chills/NS/diarrhea. Due to concern of coronavirus infection was instructed to come to ER which he did via EMS. On arrival, VS were 98, , /94, 99% RA. Initial labs revealed leukopenia (with low lymphocyte), Hb 8.1 (lower than prior), Tbili 2.6 (prev 1.3 2/12). Reports pain is gone now. Abd US showed gallstone and sludge, without wall edema. Labs notable for lower Hb, and elevated TBili which is likely 2/2 cholelithiasis.  4/30 Covid PCR repeated. Pending GI/Gen. surgery consult appreciated. CT A/P with IV and PO contrast to further evaluate patients abdominal pain. Ck dimer, LDH, haptoglobin. IVF 75ml/hr x 6hrs prior to and post CT. Ck guiac  5/1 CTabd demonstrates Cholelithiasis without any findings to suggest acute cholecystitis or any biliary obstruction.  5/2 Transfuse 1uprbc h/h 6/21 Continue with prednisone for autoimmune hemolytic anemia and ISS for hyperglycemia. Increase Lokelma dosing for hyperkalemia 6.1    5/3 H/H stable. Hyperkalemia resolved. Continue with present management.  5/4: VSS, H/H stable. Hyperkalemia resolved - currently on 10g Lokelma QD. Continue prednisone 75mg QD and folic acid 1 mg QD per heme. GI recommending UGIS and outpt EGD for further evaluation of questionable gastric mural thickening. Will d/w transplant team.   Discharge planning: d/c when stable, PT eval pending 68 y/o M w/ h/o NICM s/p HM2 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 Per patient, has had chest pain intermittently since surgery but improves with tylenol prn (takes 1x/week) but this was more severe so contacted transplant coordinator. Also had checked temp at home and found to be 95. Denies any sick contacts, fevers/chills/NS/diarrhea. Due to concern of coronavirus infection was instructed to come to ER which he did via EMS. On arrival, VS were 98, , /94, 99% RA. Initial labs revealed leukopenia (with low lymphocyte), Hb 8.1 (lower than prior), Tbili 2.6 (prev 1.3 2/12). Reports pain is gone now. Abd US showed gallstone and sludge, without wall edema. Labs notable for lower Hb, and elevated TBili which is likely 2/2 cholelithiasis.  4/30 Covid PCR repeated. Pending GI/Gen. surgery consult appreciated. CT A/P with IV and PO contrast to further evaluate patients abdominal pain. Ck dimer, LDH, haptoglobin. IVF 75ml/hr x 6hrs prior to and post CT. Ck guiac  5/1 CTabd demonstrates Cholelithiasis without any findings to suggest acute cholecystitis or any biliary obstruction.  5/2 Transfuse 1uprbc h/h 6/21 Continue with prednisone for autoimmune hemolytic anemia and ISS for hyperglycemia. Increase Lokelma dosing for hyperkalemia 6.1    5/3 H/H stable. Hyperkalemia resolved. Continue with present management.  5/4: VSS, H/H stable. Hyperkalemia resolved - currently on 10g Lokelma QD. Continue prednisone 75mg QD and folic acid 1 mg QD per heme. F/u CMV PCR, Hep B and Hep C per heme recs. GI recommending UGIS and outpt EGD for further evaluation of questionable gastric mural thickening. Will d/w transplant team.   Discharge planning: d/c when stable, PT eval pending

## 2020-05-04 NOTE — PROGRESS NOTE ADULT - ASSESSMENT
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 Unclear etiology, suspecting Cholelithiasis. Hematology was consulted for hemolytic anemia     # Warm autoimmune hemolytic anemia:  - Jacky IgG Ab+, elevated Tbili 2.3 (direct 0.3), LDH (364), and low haptoglobin (<20)  - peripheral smear: no schistocytes and +microspherocytes. Consistent with hemolytic anemia, NOT HUS/TTP.   - Need to look into the etiology: f/u Hep C, Hep C and CMV PCR     - peripheral flow cytometry sent 5/1   - check CBC with diff, reticulocyte count, CMP, LDH, haptoglobin daily   - continue folic acid 1mg  - continue prednisone 1mg/kg PO daily (prednisone 75mg) daily      Isabelle Trinidad MD  Hematology/Oncology Fellow, PGY-5  pager: [phone]  After 5pm or on weekends, please page the on-call fellow. 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 Unclear etiology, suspecting Cholelithiasis. Hematology was consulted for hemolytic anemia     # Warm autoimmune hemolytic anemia:  - Jacky IgG Ab+, elevated Tbili 2.3 (direct 0.3), LDH (364), and low haptoglobin (<20)  - peripheral smear: no schistocytes and +microspherocytes. Consistent with hemolytic anemia, NOT HUS/TTP.   - Need to look into the etiology: f/u Hep B and CMV PCR   - also recommend checking Hep C and EBV PCR    - peripheral flow cytometry sent 5/1   - check CBC with diff, reticulocyte count, CMP, LDH, haptoglobin daily   - continue folic acid 1mg  - continue prednisone 1mg/kg PO daily (prednisone 75mg) daily      Isabelle Trinidad MD  Hematology/Oncology Fellow, PGY-5  pager: [phone]  After 5pm or on weekends, please page the on-call fellow. 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 Unclear etiology, suspecting Cholelithiasis. Hematology was consulted for hemolytic anemia     # Warm autoimmune hemolytic anemia:  - Jacky IgG Ab+, elevated Tbili 2.3 (direct 0.3), LDH (364), and low haptoglobin (<20)  - peripheral smear: no schistocytes and +microspherocytes. Consistent with hemolytic anemia, NOT HUS/TTP.   - Need to look into the etiology: f/u Hep B and CMV PCR   - also recommend checking Hep C, EBV PCR, Parvovirus 19 PCR    - peripheral flow cytometry sent 5/1   - check CBC with diff, reticulocyte count, CMP, LDH, haptoglobin daily   - continue folic acid 1mg  - continue prednisone 1mg/kg PO daily (prednisone 75mg) daily      Isablele Trinidad MD  Hematology/Oncology Fellow, PGY-5  pager: [phone]  After 5pm or on weekends, please page the on-call fellow.

## 2020-05-04 NOTE — PROGRESS NOTE ADULT - PROBLEM SELECTOR PLAN 2
- cont prednisone 75 mg daily   - transfuse for Hgb<7  -Awaiting CMV, Parvovirus, EBV, HCV, and HBV PCR's  (HIV negative)

## 2020-05-04 NOTE — PROGRESS NOTE ADULT - ASSESSMENT
70 year old male with a hx of non-ischemic cardiomyopathy s/p heart transplant, HCV s/p treatment, CKD and history of small bowel angiectasias presents with chest pain associated with abdominal pain.     Impression:  # Abdominal Pain: Pain not clearly consistent with GI pathology [not related to food/BMs, worsened with exertion].  # Normocytic Anemia secondary to hemolysis: Started on prednisone for warm AIHA  # NICM s/p transplant  # HCV s/p treatment  # CKD    Recommendation:  - Outpatient EGD for evaluation of questionable gastric mural thickening. Suspect related to peristalsis/ inadequate distention  - Can perform UGIS    - Transfuse to Hb > 7.0  - Supportive care per primary team    Mitzi King MD  Gastroenterology Fellow  [phone]  Please page on call fellow on weekends and after 5pm on weekdays

## 2020-05-04 NOTE — PROGRESS NOTE ADULT - SUBJECTIVE AND OBJECTIVE BOX
Subjective: "I'm feeling good." Denies chest pain, SOB, palpitations, headache, dizziness, fever, chills and n/v/d.     Telemetry: ST 1st degree AVB 100s-120s     Vital Signs Last 24 Hrs  T(C): 36.6 (20 @ 05:05), Max: 36.8 (20 @ 14:05)  T(F): 97.8 (20 @ 05:05), Max: 98.2 (20 @ 14:05)  HR: 106 (20 @ 05:05) (106 - 116)  BP: 132/87 (20 @ 05:05) (120/75 - 132/87)  RR: 18 (20 @ 05:05) (18 - 18)  SpO2: 99% (20 @ 05:05) (98% - 99%)              07:01  -   @ 07:00  --------------------------------------------------------  IN: 840 mL / OUT: 1250 mL / NET: -410 mL     07:01  -   @ 12:03  --------------------------------------------------------  IN: 240 mL / OUT: 400 mL / NET: -160 mL      Daily Weight in k (04 May 2020 08:53)      POCT Blood Glucose.: 147 mg/dL (04 May 2020 11:52)  POCT Blood Glucose.: 114 mg/dL (04 May 2020 07:45)  POCT Blood Glucose.: 171 mg/dL (03 May 2020 21:32)  POCT Blood Glucose.: 236 mg/dL (03 May 2020 16:53)       Coumadin    [ ] YES          [ x ]      NO         Reason:                               8.2    9.21  )-----------( 256      ( 04 May 2020 08:24 )             26.7     05-04    136  |  102  |  38<H>  ----------------------------<  108<H>  4.7   |  20<L>  |  1.43<H>    Ca    8.8      04 May 2020 08:24      PHYSICAL EXAM:    Neurology: alert and oriented x 3, nonfocal, no gross deficits    CV: tachycardic, regular rhythm, +S1/S2, no murmurs appreciated    Sternal Wound: MSI well-healed, Stable    Lungs: CTA b/l, no wheezes, rales or rhonchi     Abdomen: soft, nontender, nondistended, (+) bowel sounds, (+) BM    : voiding freely               Extremities: trace LE edema b/l, no calf tenderness, PPP b/l           acetaminophen Tablet 650 milliGRAM(s) Oral every 6 hours PRN  aspirin enteric coated 81 milliGRAM(s) Oral daily  atovaquone Suspension 1500 milliGRAM(s) Oral daily  insulin lispro (HumaLOG) corrective regimen sliding scale SubCutaneous three times a day before meals  insulin lispro (HumaLOG) corrective regimen sliding scale SubCutaneous at bedtime  magnesium oxide 400 milliGRAM(s) Oral daily  mycophenolate mofetil 500 milliGRAM(s) Oral two times a day  pravastatin 20 milliGRAM(s) Oral at bedtime  predniSONE Tablet 75 milliGRAM(s) Oral daily  sodium bicarbonate 650 milliGRAM(s) Oral two times a day  sodium chloride 0.9% lock flush 3 milliLiter(s) IV Push every 8 hours  sodium chloride 0.9%. 1000 milliLiter(s) IV Continuous <Continuous>  sodium zirconium cyclosilicate 10 Gram(s) Oral <User Schedule>  sodium zirconium cyclosilicate 10 Gram(s) Oral <User Schedule>  tacrolimus 5 milliGRAM(s) Oral <User Schedule>  valGANciclovir 450 milliGRAM(s) Oral daily      Physical Therapy Rec:   Home  [  ]   Home w/ PT  [  ]  Rehab  [  ] - pending     Discussed with Cardiothoracic Team at AM rounds. Subjective: "I'm feeling good." Denies chest pain, SOB, palpitations, headache, dizziness, fever, chills and n/v/d.     Telemetry: ST 1st degree AVB 100s-120s      Vital Signs Last 24 Hrs  T(C): 36.6 (20 @ 05:05), Max: 36.8 (20 @ 14:05)  T(F): 97.8 (20 @ 05:05), Max: 98.2 (20 @ 14:05)  HR: 106 (20 @ 05:05) (106 - 116)  BP: 132/87 (20 @ 05:05) (120/75 - 132/87)  RR: 18 (20 @ 05:05) (18 - 18)  SpO2: 99% (20 @ 05:05) (98% - 99%)              07:01  -   @ 07:00  --------------------------------------------------------  IN: 840 mL / OUT: 1250 mL / NET: -410 mL     07:01  -   @ 12:03  --------------------------------------------------------  IN: 240 mL / OUT: 400 mL / NET: -160 mL      Daily Weight in k (04 May 2020 08:53)      POCT Blood Glucose.: 147 mg/dL (04 May 2020 11:52)  POCT Blood Glucose.: 114 mg/dL (04 May 2020 07:45)  POCT Blood Glucose.: 171 mg/dL (03 May 2020 21:32)  POCT Blood Glucose.: 236 mg/dL (03 May 2020 16:53)       Coumadin    [ ] YES          [ x ]      NO         Reason:                               8.2    9.21  )-----------( 256      ( 04 May 2020 08:24 )             26.7     05-04    136  |  102  |  38<H>  ----------------------------<  108<H>  4.7   |  20<L>  |  1.43<H>    Ca    8.8      04 May 2020 08:24      PHYSICAL EXAM:    Neurology: alert and oriented x 3, nonfocal, no gross deficits    CV: tachycardic, regular rhythm, +S1/S2, no murmurs appreciated    Sternal Wound: MSI well-healed, Stable    Lungs: CTA b/l, no wheezes, rales or rhonchi     Abdomen: soft, nontender, nondistended, (+) bowel sounds, (+) BM    : voiding freely               Extremities: trace LE edema b/l, no calf tenderness, PPP b/l           acetaminophen Tablet 650 milliGRAM(s) Oral every 6 hours PRN  aspirin enteric coated 81 milliGRAM(s) Oral daily  atovaquone Suspension 1500 milliGRAM(s) Oral daily  insulin lispro (HumaLOG) corrective regimen sliding scale SubCutaneous three times a day before meals  insulin lispro (HumaLOG) corrective regimen sliding scale SubCutaneous at bedtime  magnesium oxide 400 milliGRAM(s) Oral daily  mycophenolate mofetil 500 milliGRAM(s) Oral two times a day  pravastatin 20 milliGRAM(s) Oral at bedtime  predniSONE Tablet 75 milliGRAM(s) Oral daily  sodium bicarbonate 650 milliGRAM(s) Oral two times a day  sodium chloride 0.9% lock flush 3 milliLiter(s) IV Push every 8 hours  sodium chloride 0.9%. 1000 milliLiter(s) IV Continuous <Continuous>  sodium zirconium cyclosilicate 10 Gram(s) Oral <User Schedule>  sodium zirconium cyclosilicate 10 Gram(s) Oral <User Schedule>  tacrolimus 5 milliGRAM(s) Oral <User Schedule>  valGANciclovir 450 milliGRAM(s) Oral daily      Physical Therapy Rec:   Home  [  ]   Home w/ PT  [  ]  Rehab  [  ] - pending     Discussed with Cardiothoracic Team at AM rounds.

## 2020-05-05 ENCOUNTER — NON-APPOINTMENT (OUTPATIENT)
Age: 70
End: 2020-05-05

## 2020-05-05 LAB
ALBUMIN SERPL ELPH-MCNC: 4 G/DL — SIGNIFICANT CHANGE UP (ref 3.3–5)
ALP SERPL-CCNC: 86 U/L — SIGNIFICANT CHANGE UP (ref 40–120)
ALT FLD-CCNC: 13 U/L — SIGNIFICANT CHANGE UP (ref 10–45)
ANION GAP SERPL CALC-SCNC: 12 MMOL/L — SIGNIFICANT CHANGE UP (ref 5–17)
AST SERPL-CCNC: 22 U/L — SIGNIFICANT CHANGE UP (ref 10–40)
BASOPHILS # BLD AUTO: 0.01 K/UL — SIGNIFICANT CHANGE UP (ref 0–0.2)
BASOPHILS NFR BLD AUTO: 0.1 % — SIGNIFICANT CHANGE UP (ref 0–2)
BILIRUB SERPL-MCNC: 2.6 MG/DL — HIGH (ref 0.2–1.2)
BUN SERPL-MCNC: 41 MG/DL — HIGH (ref 7–23)
CALCIUM SERPL-MCNC: 9 MG/DL — SIGNIFICANT CHANGE UP (ref 8.4–10.5)
CHLORIDE SERPL-SCNC: 104 MMOL/L — SIGNIFICANT CHANGE UP (ref 96–108)
CO2 SERPL-SCNC: 20 MMOL/L — LOW (ref 22–31)
CREAT SERPL-MCNC: 1.54 MG/DL — HIGH (ref 0.5–1.3)
D DIMER BLD IA.RAPID-MCNC: 259 NG/ML DDU — HIGH
EBV DNA SERPL NAA+PROBE-ACNC: SIGNIFICANT CHANGE UP IU/ML
EBV EA AB SER IA-ACNC: 9.6 U/ML — HIGH
EBV EA AB TITR SER IF: POSITIVE
EBV EA IGG SER-ACNC: ABNORMAL
EBV NA IGG SER IA-ACNC: 368 U/ML — HIGH
EBV PATRN SPEC IB-IMP: SIGNIFICANT CHANGE UP
EBV VCA IGG AVIDITY SER QL IA: POSITIVE
EBV VCA IGM SER IA-ACNC: 269 U/ML — HIGH
EBV VCA IGM SER IA-ACNC: <10 U/ML — SIGNIFICANT CHANGE UP
EBV VCA IGM TITR FLD: NEGATIVE — SIGNIFICANT CHANGE UP
EBVPCR LOG: SIGNIFICANT CHANGE UP LOG10IU/ML
EOSINOPHIL # BLD AUTO: 0.2 K/UL — SIGNIFICANT CHANGE UP (ref 0–0.5)
EOSINOPHIL NFR BLD AUTO: 2.6 % — SIGNIFICANT CHANGE UP (ref 0–6)
FIBRINOGEN PPP-MCNC: 356 MG/DL — SIGNIFICANT CHANGE UP (ref 350–510)
GLUCOSE BLDC GLUCOMTR-MCNC: 110 MG/DL — HIGH (ref 70–99)
GLUCOSE BLDC GLUCOMTR-MCNC: 140 MG/DL — HIGH (ref 70–99)
GLUCOSE BLDC GLUCOMTR-MCNC: 156 MG/DL — HIGH (ref 70–99)
GLUCOSE BLDC GLUCOMTR-MCNC: 192 MG/DL — HIGH (ref 70–99)
GLUCOSE SERPL-MCNC: 103 MG/DL — HIGH (ref 70–99)
HAPTOGLOB SERPL-MCNC: <20 MG/DL — LOW (ref 34–200)
HCT VFR BLD CALC: 24 % — LOW (ref 39–50)
HCV RNA SERPL NAA DL=5-ACNC: SIGNIFICANT CHANGE UP IU/ML
HCV RNA SPEC NAA+PROBE-LOG IU: SIGNIFICANT CHANGE UP LOG10IU/ML
HGB BLD-MCNC: 7.8 G/DL — LOW (ref 13–17)
IMM GRANULOCYTES NFR BLD AUTO: 0.8 % — SIGNIFICANT CHANGE UP (ref 0–1.5)
LDH SERPL L TO P-CCNC: 376 U/L — HIGH (ref 50–242)
LYMPHOCYTES # BLD AUTO: 1.15 K/UL — SIGNIFICANT CHANGE UP (ref 1–3.3)
LYMPHOCYTES # BLD AUTO: 14.7 % — SIGNIFICANT CHANGE UP (ref 13–44)
MCHC RBC-ENTMCNC: 31.5 PG — SIGNIFICANT CHANGE UP (ref 27–34)
MCHC RBC-ENTMCNC: 32.5 GM/DL — SIGNIFICANT CHANGE UP (ref 32–36)
MCV RBC AUTO: 96.8 FL — SIGNIFICANT CHANGE UP (ref 80–100)
MONOCYTES # BLD AUTO: 0.74 K/UL — SIGNIFICANT CHANGE UP (ref 0–0.9)
MONOCYTES NFR BLD AUTO: 9.5 % — SIGNIFICANT CHANGE UP (ref 2–14)
NEUTROPHILS # BLD AUTO: 5.64 K/UL — SIGNIFICANT CHANGE UP (ref 1.8–7.4)
NEUTROPHILS NFR BLD AUTO: 72.3 % — SIGNIFICANT CHANGE UP (ref 43–77)
NRBC # BLD: 0 /100 WBCS — SIGNIFICANT CHANGE UP (ref 0–0)
PLATELET # BLD AUTO: 262 K/UL — SIGNIFICANT CHANGE UP (ref 150–400)
POTASSIUM SERPL-MCNC: 4.3 MMOL/L — SIGNIFICANT CHANGE UP (ref 3.5–5.3)
POTASSIUM SERPL-SCNC: 4.3 MMOL/L — SIGNIFICANT CHANGE UP (ref 3.5–5.3)
PROT SERPL-MCNC: 7.9 G/DL — SIGNIFICANT CHANGE UP (ref 6–8.3)
RBC # BLD: 2.48 M/UL — LOW (ref 4.2–5.8)
RBC # BLD: 2.48 M/UL — LOW (ref 4.2–5.8)
RBC # FLD: 16.5 % — HIGH (ref 10.3–14.5)
RETICS #: 71.2 K/UL — SIGNIFICANT CHANGE UP (ref 25–125)
RETICS/RBC NFR: 2.9 % — HIGH (ref 0.5–2.5)
SODIUM SERPL-SCNC: 136 MMOL/L — SIGNIFICANT CHANGE UP (ref 135–145)
TACROLIMUS SERPL-MCNC: 5.5 NG/ML — SIGNIFICANT CHANGE UP
TM INTERPRETATION: SIGNIFICANT CHANGE UP
WBC # BLD: 7.8 K/UL — SIGNIFICANT CHANGE UP (ref 3.8–10.5)
WBC # FLD AUTO: 7.8 K/UL — SIGNIFICANT CHANGE UP (ref 3.8–10.5)

## 2020-05-05 PROCEDURE — 99231 SBSQ HOSP IP/OBS SF/LOW 25: CPT

## 2020-05-05 PROCEDURE — 99232 SBSQ HOSP IP/OBS MODERATE 35: CPT | Mod: GC

## 2020-05-05 PROCEDURE — 99233 SBSQ HOSP IP/OBS HIGH 50: CPT | Mod: GC

## 2020-05-05 PROCEDURE — 99232 SBSQ HOSP IP/OBS MODERATE 35: CPT

## 2020-05-05 RX ORDER — TACROLIMUS 1 MG/1
1 CAPSULE ORAL
Qty: 300 | Refills: 5 | Status: DISCONTINUED | COMMUNITY
Start: 2018-08-28 | End: 2020-05-05

## 2020-05-05 RX ORDER — CALCIUM CITRATE/VITAMIN D3 315MG-6.25
315-200 TABLET ORAL
Qty: 120 | Refills: 5 | Status: DISCONTINUED | COMMUNITY
Start: 2018-04-12 | End: 2020-05-05

## 2020-05-05 RX ADMIN — SODIUM ZIRCONIUM CYCLOSILICATE 10 GRAM(S): 10 POWDER, FOR SUSPENSION ORAL at 13:42

## 2020-05-05 RX ADMIN — Medication 1 MILLIGRAM(S): at 11:35

## 2020-05-05 RX ADMIN — SODIUM CHLORIDE 3 MILLILITER(S): 9 INJECTION INTRAMUSCULAR; INTRAVENOUS; SUBCUTANEOUS at 13:42

## 2020-05-05 RX ADMIN — Medication 81 MILLIGRAM(S): at 11:34

## 2020-05-05 RX ADMIN — Medication 500000 UNIT(S): at 17:34

## 2020-05-05 RX ADMIN — SODIUM CHLORIDE 3 MILLILITER(S): 9 INJECTION INTRAMUSCULAR; INTRAVENOUS; SUBCUTANEOUS at 21:37

## 2020-05-05 RX ADMIN — SODIUM CHLORIDE 3 MILLILITER(S): 9 INJECTION INTRAMUSCULAR; INTRAVENOUS; SUBCUTANEOUS at 06:17

## 2020-05-05 RX ADMIN — Medication 650 MILLIGRAM(S): at 06:15

## 2020-05-05 RX ADMIN — Medication 1 TABLET(S): at 11:36

## 2020-05-05 RX ADMIN — TACROLIMUS 5 MILLIGRAM(S): 5 CAPSULE ORAL at 08:06

## 2020-05-05 RX ADMIN — Medication 650 MILLIGRAM(S): at 17:34

## 2020-05-05 RX ADMIN — MAGNESIUM OXIDE 400 MG ORAL TABLET 400 MILLIGRAM(S): 241.3 TABLET ORAL at 11:35

## 2020-05-05 RX ADMIN — Medication 20 MILLIGRAM(S): at 22:21

## 2020-05-05 RX ADMIN — MYCOPHENOLATE MOFETIL 500 MILLIGRAM(S): 250 CAPSULE ORAL at 06:15

## 2020-05-05 RX ADMIN — TACROLIMUS 5 MILLIGRAM(S): 5 CAPSULE ORAL at 21:31

## 2020-05-05 RX ADMIN — Medication 500000 UNIT(S): at 11:35

## 2020-05-05 RX ADMIN — Medication 500000 UNIT(S): at 21:32

## 2020-05-05 RX ADMIN — Medication 2: at 17:30

## 2020-05-05 RX ADMIN — VALGANCICLOVIR 450 MILLIGRAM(S): 450 TABLET, FILM COATED ORAL at 11:36

## 2020-05-05 RX ADMIN — MYCOPHENOLATE MOFETIL 500 MILLIGRAM(S): 250 CAPSULE ORAL at 17:34

## 2020-05-05 RX ADMIN — Medication 500000 UNIT(S): at 08:06

## 2020-05-05 RX ADMIN — Medication 75 MILLIGRAM(S): at 06:15

## 2020-05-05 RX ADMIN — SODIUM ZIRCONIUM CYCLOSILICATE 10 GRAM(S): 10 POWDER, FOR SUSPENSION ORAL at 22:21

## 2020-05-05 NOTE — PROGRESS NOTE ADULT - ASSESSMENT
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 Per patient, has had chest pain intermittently since surgery but improves with tylenol prn (takes 1x/week) but this was more severe so contacted transplant coordinator. Also had checked temp at home and found to be 95. Denies any sick contacts, fevers/chills/NS/diarrhea. Due to concern of coronavirus infection was instructed to come to ER which he did via EMS. On arrival, VS were 98, , /94, 99% RA. Initial labs revealed leukopenia (with low lymphocyte), Hb 8.1 (lower than prior), Tbili 2.6 (prev 1.3 2/12). Reports pain is gone now. Abd US showed gallstone and sludge, without wall edema. Labs notable for lower Hb, and elevated TBili which is likely 2/2 cholelithiasis.  4/30 Covid PCR repeated. Pending GI/Gen. surgery consult appreciated. CT A/P with IV and PO contrast to further evaluate patients abdominal pain. Ck dimer, LDH, haptoglobin. IVF 75ml/hr x 6hrs prior to and post CT. Ck guiac  5/1 CTabd demonstrates Cholelithiasis without any findings to suggest acute cholecystitis or any biliary obstruction.  5/2 Transfuse 1uprbc h/h 6/21 Continue with prednisone for autoimmune hemolytic anemia and ISS for hyperglycemia. Increase Lokelma dosing for hyperkalemia 6.1    5/3 H/H stable. Hyperkalemia resolved. Continue with present management.  5/4: VSS, H/H stable. Hyperkalemia resolved - currently on 10g Lokelma QD. Continue prednisone 75mg QD and folic acid 1 mg QD per heme. F/u CMV PCR and Hep C per heme recs. GI recommending UGIS and outpt EGD for further evaluation of questionable gastric mural thickening. Will d/w transplant team.   5/5: VSS, K stable on Lokelma 10g QD. Bactrim DS started yesterday given h/o nocardia. Mepron d/c'd 5/4 per transplant team. CMV PCR, Hep C PCR and Parvovirus B19 PCR pending per heme. Today's tacro level pending.   Discharge planning: per transplant team home when cleared by heme

## 2020-05-05 NOTE — PROGRESS NOTE ADULT - SUBJECTIVE AND OBJECTIVE BOX
Subjective: no acute overnight events. Denies chest pain and abdominal pain have subsided.    Tele: ST 's             Physical Exam:  Appearance: No acute distress; well appearing  Eyes: pink conjunctiva  HENT: Normal oral mucosa  Cardiovascular: RRR, S1, S2, IV/VI systolic murmur w/ no rubs, or gallops; normal JVP  Respiratory: Clear to auscultation bilaterally  Gastrointestinal: soft, non-tender, non-distended with normal bowel sounds  Musculoskeletal: No clubbing; no joint deformity   Neurologic: Non-focal  Psychiatry: AAOx3, mood & affect appropriate  Skin: No rashes, ecchymoses, or cyanosis      Current Meds:  acetaminophen   Tablet .. 650 milliGRAM(s) Oral every 6 hours PRN  aspirin enteric coated 81 milliGRAM(s) Oral daily  folic acid 1 milliGRAM(s) Oral daily  insulin lispro (HumaLOG) corrective regimen sliding scale   SubCutaneous three times a day before meals  insulin lispro (HumaLOG) corrective regimen sliding scale   SubCutaneous at bedtime  magnesium oxide 400 milliGRAM(s) Oral daily  mycophenolate mofetil 500 milliGRAM(s) Oral two times a day  nystatin    Suspension 330199 Unit(s) Oral <User Schedule>  pravastatin 20 milliGRAM(s) Oral at bedtime  predniSONE   Tablet 75 milliGRAM(s) Oral daily  sodium bicarbonate 650 milliGRAM(s) Oral two times a day  sodium chloride 0.9% lock flush 3 milliLiter(s) IV Push every 8 hours  sodium chloride 0.9%. 1000 milliLiter(s) IV Continuous <Continuous>  sodium zirconium cyclosilicate 10 Gram(s) Oral <User Schedule>  sodium zirconium cyclosilicate 10 Gram(s) Oral <User Schedule>  tacrolimus 5 milliGRAM(s) Oral <User Schedule>  trimethoprim  160 mG/sulfamethoxazole 800 mG 1 Tablet(s) Oral daily  valGANciclovir 450 milliGRAM(s) Oral daily      Vitals:  T(C): 36.7 (05-05-20 @ 06:06), Max: 36.7 (05-05-20 @ 06:06)  T(F): 98.1 (05-05-20 @ 06:06), Max: 98.1 (05-05-20 @ 06:06)  HR: 109 (05-05-20 @ 06:06) (109 - 119)  BP: 120/81 (05-05-20 @ 06:06) (120/81 - 137/91)  RR: 18 (05-05-20 @ 06:06) (18 - 18)  SpO2: 100% (05-05-20 @ 06:06) (99% - 100%)    Physical Exam:  Appearance: No acute distress; well appearing  Eyes: pink conjunctiva  HENT: Normal oral mucosa  Cardiovascular: RRR, S1, S2, IV/VI systolic murmur w/ no rubs, or gallops; normal JVP  Respiratory: Clear to auscultation bilaterally  Gastrointestinal: soft, non-tender, non-distended with normal bowel sounds  Musculoskeletal: No clubbing; no joint deformity   Neurologic: Non-focal  Psychiatry: AAOx3, mood & affect appropriate  Skin: No rashes, ecchymoses, or cyanosis  Skin: No rashes, ecchymoses, or cyanosis      I&O's Summary    04 May 2020 07:01  -  05 May 2020 07:00  --------------------------------------------------------  IN: 880 mL / OUT: 2699 mL / NET: -1819 mL                                7.8    7.80  )-----------( 262      ( 05 May 2020 06:04 )             24.0     05-05    136  |  104  |  41<H>  ----------------------------<  103<H>  4.3   |  20<L>  |  1.54<H>    Ca    9.0      05 May 2020 06:04    TPro  7.9  /  Alb  4.0  /  TBili  2.6<H>  /  DBili  x   /  AST  22  /  ALT  13  /  AlkPhos  86  05-05      CARDIAC MARKERS ( 29 Apr 2020 17:52 )  40 ng/L / x     / x     / x     / x     / x      CARDIAC MARKERS ( 29 Apr 2020 14:26 )  36 ng/L / x     / x     / 115 U/L / x     / 2.0 ng/mL                tacrolimus   5 milliGRAM(s) Oral (05-05-20 @ 08:06)   5 milliGRAM(s) Oral (05-04-20 @ 20:25)   5 milliGRAM(s) Oral (05-04-20 @ 08:15)   5 milliGRAM(s) Oral (05-03-20 @ 20:08)          Tacrolimus (), Serum: 6.1 ng/mL (05-04-20 @ 08:53)  Tacrolimus (), Serum: 6.0 ng/mL (05-03-20 @ 09:03)  Tacrolimus (), Serum: 7.1 ng/mL (05-02-20 @ 08:59)  Tacrolimus (), Serum: 10.5 ng/mL (05-01-20 @ 07:47)  Tacrolimus (), Serum: 9.3 ng/mL (04-30-20 @ 08:37)  Tacrolimus (), Serum: 6.8 ng/mL (04-29-20 @ 16:04) Subjective: no acute overnight events. Denies chest pain and abdominal pain have subsided.    Tele: ST 's             Physical Exam:  Appearance: No acute distress; well appearing  Eyes: pink conjunctiva  HENT: Normal oral mucosa  Cardiovascular: RRR, S1, S2, IV/VI systolic murmur w/ no rubs, or gallops; normal JVP  Respiratory: Clear to auscultation bilaterally  Gastrointestinal: soft, non-tender, non-distended with normal bowel sounds  Musculoskeletal: No clubbing; no joint deformity   Neurologic: Non-focal  Psychiatry: AAOx3, mood & affect appropriate  Skin: No rashes, ecchymoses, or cyanosis      Current Meds:  acetaminophen   Tablet .. 650 milliGRAM(s) Oral every 6 hours PRN  aspirin enteric coated 81 milliGRAM(s) Oral daily  folic acid 1 milliGRAM(s) Oral daily  insulin lispro (HumaLOG) corrective regimen sliding scale   SubCutaneous three times a day before meals  insulin lispro (HumaLOG) corrective regimen sliding scale   SubCutaneous at bedtime  magnesium oxide 400 milliGRAM(s) Oral daily  mycophenolate mofetil 500 milliGRAM(s) Oral two times a day  nystatin    Suspension 553958 Unit(s) Oral <User Schedule>  pravastatin 20 milliGRAM(s) Oral at bedtime  predniSONE   Tablet 75 milliGRAM(s) Oral daily  sodium bicarbonate 650 milliGRAM(s) Oral two times a day  sodium chloride 0.9% lock flush 3 milliLiter(s) IV Push every 8 hours  sodium chloride 0.9%. 1000 milliLiter(s) IV Continuous <Continuous>  sodium zirconium cyclosilicate 10 Gram(s) Oral <User Schedule>  sodium zirconium cyclosilicate 10 Gram(s) Oral <User Schedule>  tacrolimus 5 milliGRAM(s) Oral <User Schedule>  trimethoprim  160 mG/sulfamethoxazole 800 mG 1 Tablet(s) Oral daily  valGANciclovir 450 milliGRAM(s) Oral daily      Vitals:  T(C): 36.7 (05-05-20 @ 06:06), Max: 36.7 (05-05-20 @ 06:06)  T(F): 98.1 (05-05-20 @ 06:06), Max: 98.1 (05-05-20 @ 06:06)  HR: 109 (05-05-20 @ 06:06) (109 - 119)  BP: 120/81 (05-05-20 @ 06:06) (120/81 - 137/91)  RR: 18 (05-05-20 @ 06:06) (18 - 18)  SpO2: 100% (05-05-20 @ 06:06) (99% - 100%)    Physical Exam:  Appearance: No acute distress; well appearing  Eyes: pink conjunctiva  HENT: Normal oral mucosa  Cardiovascular: RRR, S1, S2, IV/VI systolic murmur w/ no rubs, or gallops; normal JVP  Respiratory: Clear to auscultation bilaterally  Gastrointestinal: soft, non-tender, non-distended with normal bowel sounds  Musculoskeletal: No clubbing; no joint deformity   Neurologic: Non-focal  Psychiatry: AAOx3, mood & affect appropriate  Skin: No rashes, ecchymoses, or cyanosis        I&O's Summary    04 May 2020 07:01  -  05 May 2020 07:00  --------------------------------------------------------  IN: 880 mL / OUT: 2699 mL / NET: -1819 mL                                7.8    7.80  )-----------( 262      ( 05 May 2020 06:04 )             24.0     05-05    136  |  104  |  41<H>  ----------------------------<  103<H>  4.3   |  20<L>  |  1.54<H>    Ca    9.0      05 May 2020 06:04    TPro  7.9  /  Alb  4.0  /  TBili  2.6<H>  /  DBili  x   /  AST  22  /  ALT  13  /  AlkPhos  86  05-05      CARDIAC MARKERS ( 29 Apr 2020 17:52 )  40 ng/L / x     / x     / x     / x     / x      CARDIAC MARKERS ( 29 Apr 2020 14:26 )  36 ng/L / x     / x     / 115 U/L / x     / 2.0 ng/mL                tacrolimus   5 milliGRAM(s) Oral (05-05-20 @ 08:06)   5 milliGRAM(s) Oral (05-04-20 @ 20:25)   5 milliGRAM(s) Oral (05-04-20 @ 08:15)   5 milliGRAM(s) Oral (05-03-20 @ 20:08)        Tacrolimus: 5.5 5/5  Tacrolimus (), Serum: 6.1 ng/mL (05-04-20 @ 08:53)  Tacrolimus (), Serum: 6.0 ng/mL (05-03-20 @ 09:03)  Tacrolimus (), Serum: 7.1 ng/mL (05-02-20 @ 08:59)  Tacrolimus (), Serum: 10.5 ng/mL (05-01-20 @ 07:47)  Tacrolimus (), Serum: 9.3 ng/mL (04-30-20 @ 08:37)  Tacrolimus (), Serum: 6.8 ng/mL (04-29-20 @ 16:04) Subjective: no acute overnight events. Denies chest pain and abdominal pain have subsided.    Tele: ST 's          Current Meds:  acetaminophen   Tablet .. 650 milliGRAM(s) Oral every 6 hours PRN  aspirin enteric coated 81 milliGRAM(s) Oral daily  folic acid 1 milliGRAM(s) Oral daily  insulin lispro (HumaLOG) corrective regimen sliding scale   SubCutaneous three times a day before meals  insulin lispro (HumaLOG) corrective regimen sliding scale   SubCutaneous at bedtime  magnesium oxide 400 milliGRAM(s) Oral daily  mycophenolate mofetil 500 milliGRAM(s) Oral two times a day  nystatin    Suspension 244984 Unit(s) Oral <User Schedule>  pravastatin 20 milliGRAM(s) Oral at bedtime  predniSONE   Tablet 75 milliGRAM(s) Oral daily  sodium bicarbonate 650 milliGRAM(s) Oral two times a day  sodium chloride 0.9% lock flush 3 milliLiter(s) IV Push every 8 hours  sodium chloride 0.9%. 1000 milliLiter(s) IV Continuous <Continuous>  sodium zirconium cyclosilicate 10 Gram(s) Oral <User Schedule>  sodium zirconium cyclosilicate 10 Gram(s) Oral <User Schedule>  tacrolimus 5 milliGRAM(s) Oral <User Schedule>  trimethoprim  160 mG/sulfamethoxazole 800 mG 1 Tablet(s) Oral daily  valGANciclovir 450 milliGRAM(s) Oral daily      Vitals:  T(C): 36.7 (05-05-20 @ 06:06), Max: 36.7 (05-05-20 @ 06:06)  T(F): 98.1 (05-05-20 @ 06:06), Max: 98.1 (05-05-20 @ 06:06)  HR: 109 (05-05-20 @ 06:06) (109 - 119)  BP: 120/81 (05-05-20 @ 06:06) (120/81 - 137/91)  RR: 18 (05-05-20 @ 06:06) (18 - 18)  SpO2: 100% (05-05-20 @ 06:06) (99% - 100%)             Physical Exam:  Appearance: No acute distress; well appearing  Eyes: pink conjunctiva  HENT: Normal oral mucosa  Cardiovascular: RRR, S1, S2, IV/VI systolic murmur w/ no rubs, or gallops; normal JVP  Respiratory: Clear to auscultation bilaterally  Gastrointestinal: soft, non-tender, non-distended with normal bowel sounds  Musculoskeletal: No clubbing; no joint deformity   Neurologic: Non-focal  Psychiatry: AAOx3, mood & affect appropriate  Skin: No rashes, ecchymoses, or cyanosis      I&O's Summary    04 May 2020 07:01  -  05 May 2020 07:00  --------------------------------------------------------  IN: 880 mL / OUT: 2699 mL / NET: -1819 mL                                7.8    7.80  )-----------( 262      ( 05 May 2020 06:04 )             24.0     05-05    136  |  104  |  41<H>  ----------------------------<  103<H>  4.3   |  20<L>  |  1.54<H>    Ca    9.0      05 May 2020 06:04    TPro  7.9  /  Alb  4.0  /  TBili  2.6<H>  /  DBili  x   /  AST  22  /  ALT  13  /  AlkPhos  86  05-05      CARDIAC MARKERS ( 29 Apr 2020 17:52 )  40 ng/L / x     / x     / x     / x     / x      CARDIAC MARKERS ( 29 Apr 2020 14:26 )  36 ng/L / x     / x     / 115 U/L / x     / 2.0 ng/mL                tacrolimus   5 milliGRAM(s) Oral (05-05-20 @ 08:06)   5 milliGRAM(s) Oral (05-04-20 @ 20:25)   5 milliGRAM(s) Oral (05-04-20 @ 08:15)   5 milliGRAM(s) Oral (05-03-20 @ 20:08)        Tacrolimus: 5.5 5/5  Tacrolimus (), Serum: 6.1 ng/mL (05-04-20 @ 08:53)  Tacrolimus (), Serum: 6.0 ng/mL (05-03-20 @ 09:03)  Tacrolimus (), Serum: 7.1 ng/mL (05-02-20 @ 08:59)  Tacrolimus (), Serum: 10.5 ng/mL (05-01-20 @ 07:47)  Tacrolimus (), Serum: 9.3 ng/mL (04-30-20 @ 08:37)  Tacrolimus (), Serum: 6.8 ng/mL (04-29-20 @ 16:04)

## 2020-05-05 NOTE — PROGRESS NOTE ADULT - SUBJECTIVE AND OBJECTIVE BOX
Follow Up: Post-OHT, hemolytic anemia    Interval History/ROS:Patient is a 70y old  Male who presents with a chief complaint of chest pain/abdominal pain (05 May 2020 09:31)  - pt assessed at bedside. Denied any acute complaints    Allergies    No Known Allergies    Intolerances        ANTIMICROBIALS:  nystatin    Suspension [id number] <User Schedule>  trimethoprim  160 mG/sulfamethoxazole 800 mG 1 daily  valGANciclovir 450 daily      OTHER MEDS:  acetaminophen   Tablet .. 650 milliGRAM(s) Oral every 6 hours PRN  aspirin enteric coated 81 milliGRAM(s) Oral daily  folic acid 1 milliGRAM(s) Oral daily  insulin lispro (HumaLOG) corrective regimen sliding scale   SubCutaneous three times a day before meals  insulin lispro (HumaLOG) corrective regimen sliding scale   SubCutaneous at bedtime  magnesium oxide 400 milliGRAM(s) Oral daily  mycophenolate mofetil 500 milliGRAM(s) Oral two times a day  pravastatin 20 milliGRAM(s) Oral at bedtime  predniSONE   Tablet 75 milliGRAM(s) Oral daily  sodium bicarbonate 650 milliGRAM(s) Oral two times a day  sodium chloride 0.9% lock flush 3 milliLiter(s) IV Push every 8 hours  sodium chloride 0.9%. 1000 milliLiter(s) IV Continuous <Continuous>  sodium zirconium cyclosilicate 10 Gram(s) Oral <User Schedule>  sodium zirconium cyclosilicate 10 Gram(s) Oral <User Schedule>  tacrolimus 5 milliGRAM(s) Oral <User Schedule>      Vital Signs Last 24 Hrs  T(C): 36.7 (05 May 2020 12:10), Max: 36.7 (05 May 2020 06:06)  T(F): 98 (05 May 2020 12:10), Max: 98.1 (05 May 2020 06:06)  HR: 116 (05 May 2020 12:10) (109 - 119)  BP: 124/83 (05 May 2020 12:10) (120/81 - 134/80)  BP(mean): --  RR: 18 (05 May 2020 12:10) (18 - 18)  SpO2: 100% (05 May 2020 12:10) (99% - 100%)    EXAM:  Constitutional: Not in acute distress  Eyes: No icterus. Pupils b/l reactive to light.  Oral cavity: Clear, no lesions  Neck: No neck vein distension noted  RS: Chest clear to auscultation bilaterally. No wheeze/rhonchi/crepitations.  CVS: S1, S2 heard. Regular rate and rhythm. No murmurs/rubs/gallops.  Abdomen: Soft. No guarding/rigidity/tenderness. Midline scar noted  : No acute abnormalities  Extremities: Warm. No pedal edema  Skin: No lesions noted. Joint abnormalities of arthritis noted.  Vascular: No evidence of phlebitis  Neuro: Alert, oriented to time/place/person                        7.8    7.80  )-----------( 262      ( 05 May 2020 06:04 )             24.0       05-05    136  |  104  |  41<H>  ----------------------------<  103<H>  4.3   |  20<L>  |  1.54<H>    Ca    9.0      05 May 2020 06:04    TPro  7.9  /  Alb  4.0  /  TBili  2.6<H>  /  DBili  x   /  AST  22  /  ALT  13  /  AlkPhos  86  05-05

## 2020-05-05 NOTE — DIETITIAN INITIAL EVALUATION ADULT. - ADD RECOMMEND
1. Consider removing potassium restriction, value currently WNL. 2. Diet education provided, reinforce as needed. 3.  RD to remain available and follow-up as medically appropriate.

## 2020-05-05 NOTE — PROGRESS NOTE ADULT - ASSESSMENT
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 Unclear etiology, suspecting Cholelithiasis. Hematology was consulted for hemolytic anemia     # Warm autoimmune hemolytic anemia:  - Jacky IgG Ab+, elevated Tbili 2.3 (direct 0.3), LDH (364), and low haptoglobin (<20)  - peripheral smear: no schistocytes and +microspherocytes. Consistent with hemolytic anemia, NOT HUS/TTP.   - Need to look into the etiology: f/u CMV PCR , Hep C PCR, EBV PCR, Parvovirus 19 PCR; will need outpatient follow-up with ID if positive  - Need to clarify whether patient actually  received 1u pRBCs on 5/2 (pRBC ordered, but not completed in [place])     - peripheral flow cytometry sent 5/1   - check CBC with diff, reticulocyte count, CMP, LDH, haptoglobin daily   - continue folic acid 1mg  - continue prednisone 1mg/kg PO daily (prednisone 75mg) daily    - discussed with Dr. Kely Jha (HF/Transplant fellow) regarding discharge planning.    Patient has confirmed appointment with Dr. Anh Isaacs on 5/12 at 9:30am at the [facility].    He should continue prednisone 75mg daily at discharge and then decrease to 70mg daily next week.  Rest of taper will be done outpatient by hematology.       Isabelle Trinidad MD  Hematology/Oncology Fellow, PGY-5  pager: [phone]  After 5pm or on weekends, please page the on-call fellow. 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 Unclear etiology, suspecting Cholelithiasis. Hematology was consulted for hemolytic anemia     # Warm autoimmune hemolytic anemia:  - Jacky IgG Ab+, elevated Tbili 2.3 (direct 0.3), LDH (364), and low haptoglobin (<20)  - peripheral smear: no schistocytes and +microspherocytes. Consistent with hemolytic anemia, NOT HUS/TTP.   - Need to look into the etiology: f/u CMV PCR , Hep C PCR, EBV PCR, Parvovirus 19 PCR; will need outpatient follow-up with ID if positive  - s/p 1 unit PRBCs on 5/1    - peripheral flow cytometry sent 5/1   - check CBC with diff, reticulocyte count, CMP, LDH, haptoglobin daily   - continue folic acid 1mg  - continue prednisone 1mg/kg PO daily (prednisone 75mg) daily    - discussed with Dr. Kely Jha (HF/Transplant fellow) regarding discharge planning.    Patient has confirmed appointment with Dr. Anh Isaacs on 5/12 at 9:30am at the [facility].    He should continue prednisone 75mg daily at discharge and then decrease to 70mg daily next week.  Rest of taper will be done outpatient by hematology.       Isabelle Triindad MD  Hematology/Oncology Fellow, PGY-5  pager: [phone]  After 5pm or on weekends, please page the on-call fellow.

## 2020-05-05 NOTE — PROGRESS NOTE ADULT - PROBLEM SELECTOR PLAN 3
Transplant team following   Continue immunosuppression with tacrolimus/cellcept - dosed per transplant team   Continue prophylaxis with Valcyte and Bactrim DS   Mepron d/c'd 5/4 by transplant team  Daily tacro levels

## 2020-05-05 NOTE — PROGRESS NOTE ADULT - ASSESSMENT
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  P/w chest pain and abdominal pain - concern for cholelithiasis. Noted to have hemolytic anemia  ==========  1. Abdominal pain  - Gall stone seen on CT and US but without evidence of acute cholecystitis. Will likely need cholecystectomy in the future  - Also noted was gastric antrum finding (thickening?) - should undergo an endoscopy as outpatient  -----------  2. Hemolytic anemia  - Jacky shows IgG Ab+. Elevated tara, LDH, and low hapto. Smear shows no schistocytes and + microspherocytes. Not TTP/HUS. Consistent with hemolytic anemia.   - to continue steroids (per Heme)  - HCV, HBV PCR negative. HIV NR. CMV PCR negative  - Parvovirus PCR pending  -------------  3. OI prophylaxis for post-OHT  - Valganciclovir 450mg/day  - Bactrim DS daily    KELLY Rosa MD  Fellow, Infectious Diseases  Pager: [phone]  After 5pm and On weekends: Call [phone]

## 2020-05-05 NOTE — PROGRESS NOTE ADULT - SUBJECTIVE AND OBJECTIVE BOX
Subjective: "I'm feeling good." Denies chest pain, SOB, palpitations, headache, dizziness, fever, chills and n/v/d.     Telemetry: ST 100s-120s     Vital Signs Last 24 Hrs  T(C): 36.7 (20 @ 06:06), Max: 36.7 (20 @ 06:06)  T(F): 98.1 (20 @ 06:06), Max: 98.1 (20 @ 06:06)  HR: 109 (20 @ 06:06) (109 - 119)  BP: 120/81 (20 @ 06:06) (120/81 - 137/91)  RR: 18 (20 @ 06:06) (18 - 18)  SpO2: 100% (20 @ 06:06) (99% - 100%)              07:01  -   @ 07:00  --------------------------------------------------------  IN: 880 mL / OUT: 2699 mL / NET: -1819 mL     07:01  -   @ 09:32  --------------------------------------------------------  IN: 240 mL / OUT: 0 mL / NET: 240 mL      Daily Weight in k.4 (05 May 2020 08:30)      POCT Blood Glucose.: 110 mg/dL (05 May 2020 07:21)  POCT Blood Glucose.: 121 mg/dL (04 May 2020 21:34)  POCT Blood Glucose.: 160 mg/dL (04 May 2020 16:55)  POCT Blood Glucose.: 147 mg/dL (04 May 2020 11:52)       Coumadin    [ ] YES          [ x ] NO         Reason:                               7.8    7.80  )-----------( 262      ( 05 May 2020 06:04 )             24.0     05    136  |  104  |  41<H>  ----------------------------<  103<H>  4.3   |  20<L>  |  1.54<H>    Ca    9.0      05 May 2020 06:04    TPro  7.9  /  Alb  4.0  /  TBili  2.6<H>  /  DBili  x   /  AST  22  /  ALT  13  /  AlkPhos  86  05-05      PHYSICAL EXAM:    Neurology: alert and oriented x 3, nonfocal, no gross deficits    CV: tachycardic, regular rhythm, +S1/S2, no murmurs appreciated     Sternal Wound: old MSI well-healed     Lungs: CTA b/l, no wheezes, rales or rhonchi     Abdomen: soft, nontender, nondistended, (+) bowel sounds, (+) BM    : voiding freely                Extremities: no LE edema b/l, no calf tenderness, PPP b/l          acetaminophen Tablet 650 milliGRAM(s) Oral every 6 hours PRN  aspirin enteric coated 81 milliGRAM(s) Oral daily  folic acid 1 milliGRAM(s) Oral daily  insulin lispro (HumaLOG) corrective regimen sliding scale   SubCutaneous three times a day before meals  insulin lispro (HumaLOG) corrective regimen sliding scale   SubCutaneous at bedtime  magnesium oxide 400 milliGRAM(s) Oral daily  mycophenolate mofetil 500 milliGRAM(s) Oral two times a day  nystatin Suspension 385067 Unit(s) Oral <User Schedule>  pravastatin 20 milliGRAM(s) Oral at bedtime  predniSONE Tablet 75 milliGRAM(s) Oral daily  sodium bicarbonate 650 milliGRAM(s) Oral two times a day  sodium chloride 0.9% lock flush 3 milliLiter(s) IV Push every 8 hours  sodium chloride 0.9%. 1000 milliLiter(s) IV Continuous <Continuous>  sodium zirconium cyclosilicate 10 Gram(s) Oral <User Schedule>  sodium zirconium cyclosilicate 10 Gram(s) Oral <User Schedule>  tacrolimus 5 milliGRAM(s) Oral <User Schedule>  trimethoprim 160 mG/sulfamethoxazole 800 mG 1 Tablet(s) Oral daily  valGANciclovir 450 milliGRAM(s) Oral daily       Discussed with Cardiothoracic Team at AM rounds.

## 2020-05-05 NOTE — PROGRESS NOTE ADULT - PROBLEM SELECTOR PLAN 1
- s/p RHC w/ biospy 2/20 w/ normal hemodynamics and neg for rejection  - on tacrolimus 5 mg BID; tac at goal 4-6  - cont cellcept 500 mg BID  - on high dose steroids due to recent hemolytic anemia  - cont atovaquone for PCP prophylaxis   - cont valcyte for CMV prophylaxis  - Stop mepron   - Start Bactrim DS daily given history of nocardia infection (also PJP prophylaxis); K had been stable on Lokelma. - s/p RHC w/ biospy 2/20 w/ normal hemodynamics and neg for rejection  - on tacrolimus 5 mg BID; tac at goal 4-6  - cont cellcept 500 mg BID  - on high dose steroids due to recent hemolytic anemia  - cont atovaquone for PCP prophylaxis   - cont valcyte for CMV prophylaxis  - Continue Bactrim DS daily given history of nocardia infection (also PJP prophylaxis); K had been stable on Lokelma. H/H is drifting down. Transfused 5/2 and responded appropriately  - cont prednisone 75 mg daily (Heme plans to decrease prednisone by 10mg weekly (slow taper), Follow up next week for further taper plan)   - transfuse for Hgb<7  -Awaiting CMV, Parvovirus B-19, EBV, and HCV PCR's  (HIV negative; Hep B not detected). Would need ID follow up if all of the results don't come back before discharge tomorrow.

## 2020-05-05 NOTE — PROGRESS NOTE ADULT - SUBJECTIVE AND OBJECTIVE BOX
INTERVAL HPI/OVERNIGHT EVENTS:  Patient S&E at bedside.   Afebrile.  Sitting in chair, no acute complaints.       VITAL SIGNS:  T(F): 98 (05-05-20 @ 12:10)  HR: 116 (05-05-20 @ 12:10)  BP: 124/83 (05-05-20 @ 12:10)  RR: 18 (05-05-20 @ 12:10)  SpO2: 100% (05-05-20 @ 12:10)      PHYSICAL EXAM:  Constitutional: NAD  Eyes: EOMI, sclera non-icteric  Neck: supple  Respiratory: normal respiratory effort   Cardiovascular: RRR  Gastrointestinal: soft, non-distended   Extremities: no cyanosis, clubbing or edema   Neurological: awake and alert      MEDICATIONS  (STANDING):  aspirin enteric coated 81 milliGRAM(s) Oral daily  folic acid 1 milliGRAM(s) Oral daily  insulin lispro (HumaLOG) corrective regimen sliding scale   SubCutaneous three times a day before meals  insulin lispro (HumaLOG) corrective regimen sliding scale   SubCutaneous at bedtime  magnesium oxide 400 milliGRAM(s) Oral daily  mycophenolate mofetil 500 milliGRAM(s) Oral two times a day  nystatin    Suspension 100702 Unit(s) Oral <User Schedule>  pravastatin 20 milliGRAM(s) Oral at bedtime  predniSONE   Tablet 75 milliGRAM(s) Oral daily  sodium bicarbonate 650 milliGRAM(s) Oral two times a day  sodium chloride 0.9% lock flush 3 milliLiter(s) IV Push every 8 hours  sodium chloride 0.9%. 1000 milliLiter(s) (75 mL/Hr) IV Continuous <Continuous>  sodium zirconium cyclosilicate 10 Gram(s) Oral <User Schedule>  sodium zirconium cyclosilicate 10 Gram(s) Oral <User Schedule>  tacrolimus 5 milliGRAM(s) Oral <User Schedule>  trimethoprim  160 mG/sulfamethoxazole 800 mG 1 Tablet(s) Oral daily  valGANciclovir 450 milliGRAM(s) Oral daily    MEDICATIONS  (PRN):  acetaminophen   Tablet .. 650 milliGRAM(s) Oral every 6 hours PRN Mild Pain (1 - 3)      Allergies  No Known Allergies        LABS:                        7.8    7.80  )-----------( 262      ( 05 May 2020 06:04 )             24.0     05-05    136  |  104  |  41<H>  ----------------------------<  103<H>  4.3   |  20<L>  |  1.54<H>    Ca    9.0      05 May 2020 06:04    TPro  7.9  /  Alb  4.0  /  TBili  2.6<H>  /  DBili  x   /  AST  22  /  ALT  13  /  AlkPhos  86  05-05        RADIOLOGY & ADDITIONAL TESTS:  Studies reviewed.

## 2020-05-05 NOTE — CHART NOTE - NSCHARTNOTEFT_GEN_A_CORE
HEMATOLOGY NOTE    Spoke with HF/Transplant fellow Dr. Kely Jha regarding discharge planning for tomorrow.     - referral email sent this morning to [facility] for hematology follow-up as an outpatient. New Patient Unit will reach out to patient directly for appointment.  - would discharge patient on prednisone 75mg for remainder of this week, and then decrease to 70mg next week.  Rest of taper will be determined outpatient.   - will need Infectious Disease follow-up pending viral studies.     Discussed with Hematology attending, Dr. Murphy.       Isabelle Trinidad MD  Hematology/Oncology Fellow, PGY-5  pager: [phone]  After 5pm or on weekends, please page the on-call fellow.

## 2020-05-05 NOTE — PROGRESS NOTE ADULT - PROBLEM SELECTOR PLAN 1
Heme following   Continue Prednisone 75mg QD  Continue folic acid 1 mg QD   F/u CMV PCR, Parvovirus B19 PCR and Hep C PCR  Check CBC w/ diff, retic count, CMP, LDH and haptoglobin daily per heme

## 2020-05-05 NOTE — PROGRESS NOTE ADULT - ASSESSMENT
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 Brought in for concerns of COVID infection which returned negative. 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  Currently being treated with high dose of prednisone and waiting for various viral PCR's to determine the etiology. There had been case reports of hemolytic anemia secondary to calcineurin inhibitors and various viruses including CMV, Parvovirus B 19, HHV, EBV etc. For now we'll check viral PCR's. I've discussed his case with hematology. Suspicion for post-transplant lymphoproliferative disorder is low given no prominent lymph notes on CT scan.      INCOMPLETE NOTE      Kely Jha D.O.  Adv. HF and transplant fellow  [phone] 70 y/o M w/ h/o NICM s/p HM2 s/p OHT 2/23/18 with coronary fistula, HCV + s/p Rx, prior b/l subclavian DVT s/p Rx, prior AMR s/p IVIG/plasmapharesis/Rituximab, CKD (b/l Cr 1.4) who presented w/ atypical chest pain and abdominal tightness. Brought in for concerns of COVID infection which returned negative. 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  Currently being treated with high dose of prednisone and waiting for various viral PCR's to determine the etiology. There had been case reports of hemolytic anemia secondary to calcineurin inhibitors and various viruses including CMV, Parvovirus B 19, HHV, EBV etc. For now we'll check viral PCR's. I've discussed his case with hematology. Suspicion for post-transplant lymphoproliferative disorder is low given no prominent lymph notes on CT scan.    Possible discharge tomorrow (not safe for discharge today, no one at home) and continued close follow up with Heme and ID as outpatient. Symptoms mostly resolved. Hemodynamically stable.      Kely Jha D.O.  Adv. HF and transplant fellow  [phone]

## 2020-05-05 NOTE — PROGRESS NOTE ADULT - PROBLEM SELECTOR PLAN 2
- cont prednisone 75 mg daily   - transfuse for Hgb<7  -Awaiting CMV, Parvovirus, EBV, HCV, and HBV PCR's  (HIV negative) H/H is drifting down. Transfused 5/2 and responded appropriately  - cont prednisone 75 mg daily (Heme plans to decrease prednisone by 10mg weekly (slow taper), Follow up next week for further taper plan)   - transfuse for Hgb<7  -Awaiting CMV, Parvovirus B-19, EBV, and HCV PCR's  (HIV negative; Hep B not detected). Would need ID follow up if all of the results don't come back before discharge tomorrow. - s/p RHC w/ biospy 2/20 w/ normal hemodynamics and neg for rejection  - on tacrolimus 5 mg BID; tac at goal 4-6  - cont cellcept 500 mg BID  - on high dose steroids due to recent hemolytic anemia  - cont atovaquone for PCP prophylaxis   - cont valcyte for CMV prophylaxis  - Continue Bactrim DS daily given history of nocardia infection (also PJP prophylaxis); K had been stable on Lokelma.

## 2020-05-05 NOTE — DIETITIAN INITIAL EVALUATION ADULT. - OTHER INFO
Pertinent Information: This is a 68 y/o M w/ h/o NICM s/p HM2 s/p OHT 2/23/18 with coronary fistula, HCV + s/p Rx, prior b/l subclavian DVT s/p Rx, prior AMR s/p IVIG/plasmapharesis/Rituximab, CKD (b/l Cr 1.4) who presented w/ atypical chest pain and abdominal tightness. Brought in for concerns of COVID infection which returned negative. 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  Currently being treated with high dose of prednisone. Discharge planning.     Pt reports good PO intake and appetite at home, limits sodium in the diet. Confirms NKFA, pt denying micronutrient supplementation, Pt denies chewing/swallowing difficulty, nausea, vomiting, diarrhea, constipation PTA.     Weights: pt reports weight has been stable, reports UBW as 163-165lbs, current dosing weight is 164.5lbs which is consistent. Weight per previous RD note on 9/11/2018 noted weight as 156.5lb.     Since admission pt reports good PO intake consuming % of meals. No acute GI distress noted. Last BM 5/4. Discussed with patient avoiding/limiting concentrated sweets while on high dose steroids to help with BG control. Pt reports understanding, mostly drinks water during the day, avoids fruit juice and soda. Briefly reviewed sources of carbohydrates, portion sizes, well balance meals with protein and the importance of consistent eating pattern to help optimize glycemic control, written material provided. Patient with no nutrition-related questions at this time. Made aware RD remains available as needed.

## 2020-05-06 ENCOUNTER — TRANSCRIPTION ENCOUNTER (OUTPATIENT)
Age: 70
End: 2020-05-06

## 2020-05-06 LAB
ALBUMIN SERPL ELPH-MCNC: 3.9 G/DL — SIGNIFICANT CHANGE UP (ref 3.3–5)
ALP SERPL-CCNC: 72 U/L — SIGNIFICANT CHANGE UP (ref 40–120)
ALT FLD-CCNC: 13 U/L — SIGNIFICANT CHANGE UP (ref 10–45)
ANION GAP SERPL CALC-SCNC: 12 MMOL/L — SIGNIFICANT CHANGE UP (ref 5–17)
ANTIBODY ID 1_1: SIGNIFICANT CHANGE UP
ANTIBODY ID 1_2: SIGNIFICANT CHANGE UP
AST SERPL-CCNC: 20 U/L — SIGNIFICANT CHANGE UP (ref 10–40)
BASOPHILS # BLD AUTO: 0 K/UL — SIGNIFICANT CHANGE UP (ref 0–0.2)
BASOPHILS NFR BLD AUTO: 0 % — SIGNIFICANT CHANGE UP (ref 0–2)
BILIRUB SERPL-MCNC: 2.2 MG/DL — HIGH (ref 0.2–1.2)
BLD GP AB SCN SERPL QL: POSITIVE — SIGNIFICANT CHANGE UP
BUN SERPL-MCNC: 40 MG/DL — HIGH (ref 7–23)
CALCIUM SERPL-MCNC: 8.7 MG/DL — SIGNIFICANT CHANGE UP (ref 8.4–10.5)
CHLORIDE SERPL-SCNC: 105 MMOL/L — SIGNIFICANT CHANGE UP (ref 96–108)
CO2 SERPL-SCNC: 22 MMOL/L — SIGNIFICANT CHANGE UP (ref 22–31)
CREAT SERPL-MCNC: 1.49 MG/DL — HIGH (ref 0.5–1.3)
D DIMER BLD IA.RAPID-MCNC: 213 NG/ML DDU — SIGNIFICANT CHANGE UP
DAT POLY-SP REAG RBC QL: POSITIVE — SIGNIFICANT CHANGE UP
EOSINOPHIL # BLD AUTO: 0 K/UL — SIGNIFICANT CHANGE UP (ref 0–0.5)
EOSINOPHIL NFR BLD AUTO: 0 % — SIGNIFICANT CHANGE UP (ref 0–6)
FIBRINOGEN PPP-MCNC: 302 MG/DL — LOW (ref 350–510)
GLUCOSE BLDC GLUCOMTR-MCNC: 113 MG/DL — HIGH (ref 70–99)
GLUCOSE BLDC GLUCOMTR-MCNC: 116 MG/DL — HIGH (ref 70–99)
GLUCOSE BLDC GLUCOMTR-MCNC: 171 MG/DL — HIGH (ref 70–99)
GLUCOSE BLDC GLUCOMTR-MCNC: 171 MG/DL — HIGH (ref 70–99)
GLUCOSE SERPL-MCNC: 111 MG/DL — HIGH (ref 70–99)
HAPTOGLOB SERPL-MCNC: <20 MG/DL — LOW (ref 34–200)
HCT VFR BLD CALC: 22.9 % — LOW (ref 39–50)
HGB BLD-MCNC: 7.4 G/DL — LOW (ref 13–17)
IMM GRANULOCYTES NFR BLD AUTO: 0.8 % — SIGNIFICANT CHANGE UP (ref 0–1.5)
LDH SERPL L TO P-CCNC: 321 U/L — HIGH (ref 50–242)
LYMPHOCYTES # BLD AUTO: 1.1 K/UL — SIGNIFICANT CHANGE UP (ref 1–3.3)
LYMPHOCYTES # BLD AUTO: 14.1 % — SIGNIFICANT CHANGE UP (ref 13–44)
MCHC RBC-ENTMCNC: 31 PG — SIGNIFICANT CHANGE UP (ref 27–34)
MCHC RBC-ENTMCNC: 32.3 GM/DL — SIGNIFICANT CHANGE UP (ref 32–36)
MCV RBC AUTO: 95.8 FL — SIGNIFICANT CHANGE UP (ref 80–100)
MONOCYTES # BLD AUTO: 0.68 K/UL — SIGNIFICANT CHANGE UP (ref 0–0.9)
MONOCYTES NFR BLD AUTO: 8.7 % — SIGNIFICANT CHANGE UP (ref 2–14)
NEUTROPHILS # BLD AUTO: 5.98 K/UL — SIGNIFICANT CHANGE UP (ref 1.8–7.4)
NEUTROPHILS NFR BLD AUTO: 76.4 % — SIGNIFICANT CHANGE UP (ref 43–77)
NRBC # BLD: 0 /100 WBCS — SIGNIFICANT CHANGE UP (ref 0–0)
PLATELET # BLD AUTO: 254 K/UL — SIGNIFICANT CHANGE UP (ref 150–400)
POTASSIUM SERPL-MCNC: 4.3 MMOL/L — SIGNIFICANT CHANGE UP (ref 3.5–5.3)
POTASSIUM SERPL-SCNC: 4.3 MMOL/L — SIGNIFICANT CHANGE UP (ref 3.5–5.3)
PROT SERPL-MCNC: 7.4 G/DL — SIGNIFICANT CHANGE UP (ref 6–8.3)
RBC # BLD: 2.39 M/UL — LOW (ref 4.2–5.8)
RBC # BLD: 2.39 M/UL — LOW (ref 4.2–5.8)
RBC # FLD: 16.5 % — HIGH (ref 10.3–14.5)
RETICS #: 53.8 K/UL — SIGNIFICANT CHANGE UP (ref 25–125)
RETICS/RBC NFR: 2.3 % — SIGNIFICANT CHANGE UP (ref 0.5–2.5)
RH IG SCN BLD-IMP: POSITIVE — SIGNIFICANT CHANGE UP
SODIUM SERPL-SCNC: 139 MMOL/L — SIGNIFICANT CHANGE UP (ref 135–145)
TACROLIMUS SERPL-MCNC: 4.4 NG/ML — SIGNIFICANT CHANGE UP
WBC # BLD: 7.82 K/UL — SIGNIFICANT CHANGE UP (ref 3.8–10.5)
WBC # FLD AUTO: 7.82 K/UL — SIGNIFICANT CHANGE UP (ref 3.8–10.5)

## 2020-05-06 PROCEDURE — 99232 SBSQ HOSP IP/OBS MODERATE 35: CPT | Mod: GC

## 2020-05-06 PROCEDURE — 99233 SBSQ HOSP IP/OBS HIGH 50: CPT | Mod: GC

## 2020-05-06 PROCEDURE — 86077 PHYS BLOOD BANK SERV XMATCH: CPT

## 2020-05-06 PROCEDURE — 99231 SBSQ HOSP IP/OBS SF/LOW 25: CPT

## 2020-05-06 RX ORDER — TACROLIMUS 5 MG/1
6 CAPSULE ORAL
Refills: 0 | Status: DISCONTINUED | OUTPATIENT
Start: 2020-05-06 | End: 2020-05-07

## 2020-05-06 RX ADMIN — Medication 2: at 16:19

## 2020-05-06 RX ADMIN — Medication 650 MILLIGRAM(S): at 06:18

## 2020-05-06 RX ADMIN — SODIUM ZIRCONIUM CYCLOSILICATE 10 GRAM(S): 10 POWDER, FOR SUSPENSION ORAL at 15:16

## 2020-05-06 RX ADMIN — Medication 500000 UNIT(S): at 16:19

## 2020-05-06 RX ADMIN — TACROLIMUS 5 MILLIGRAM(S): 5 CAPSULE ORAL at 08:24

## 2020-05-06 RX ADMIN — MYCOPHENOLATE MOFETIL 500 MILLIGRAM(S): 250 CAPSULE ORAL at 06:18

## 2020-05-06 RX ADMIN — Medication 650 MILLIGRAM(S): at 18:34

## 2020-05-06 RX ADMIN — MAGNESIUM OXIDE 400 MG ORAL TABLET 400 MILLIGRAM(S): 241.3 TABLET ORAL at 08:25

## 2020-05-06 RX ADMIN — Medication 650 MILLIGRAM(S): at 13:58

## 2020-05-06 RX ADMIN — SODIUM CHLORIDE 3 MILLILITER(S): 9 INJECTION INTRAMUSCULAR; INTRAVENOUS; SUBCUTANEOUS at 06:17

## 2020-05-06 RX ADMIN — VALGANCICLOVIR 450 MILLIGRAM(S): 450 TABLET, FILM COATED ORAL at 08:25

## 2020-05-06 RX ADMIN — Medication 20 MILLIGRAM(S): at 20:03

## 2020-05-06 RX ADMIN — Medication 500000 UNIT(S): at 08:24

## 2020-05-06 RX ADMIN — Medication 1 MILLIGRAM(S): at 08:25

## 2020-05-06 RX ADMIN — Medication 500000 UNIT(S): at 12:48

## 2020-05-06 RX ADMIN — SODIUM CHLORIDE 3 MILLILITER(S): 9 INJECTION INTRAMUSCULAR; INTRAVENOUS; SUBCUTANEOUS at 20:03

## 2020-05-06 RX ADMIN — Medication 75 MILLIGRAM(S): at 06:18

## 2020-05-06 RX ADMIN — MYCOPHENOLATE MOFETIL 500 MILLIGRAM(S): 250 CAPSULE ORAL at 18:34

## 2020-05-06 RX ADMIN — SODIUM CHLORIDE 3 MILLILITER(S): 9 INJECTION INTRAMUSCULAR; INTRAVENOUS; SUBCUTANEOUS at 15:15

## 2020-05-06 RX ADMIN — Medication 1 TABLET(S): at 08:25

## 2020-05-06 RX ADMIN — Medication 81 MILLIGRAM(S): at 08:24

## 2020-05-06 RX ADMIN — SODIUM ZIRCONIUM CYCLOSILICATE 10 GRAM(S): 10 POWDER, FOR SUSPENSION ORAL at 21:42

## 2020-05-06 RX ADMIN — Medication 500000 UNIT(S): at 20:01

## 2020-05-06 RX ADMIN — TACROLIMUS 6 MILLIGRAM(S): 5 CAPSULE ORAL at 20:01

## 2020-05-06 NOTE — DISCHARGE NOTE PROVIDER - NSDCACTIVITY_GEN_ALL_CORE
Sex allowed/Bathing allowed/Stairs allowed/Walking - Outdoors allowed/Walking - Indoors allowed/Showering allowed

## 2020-05-06 NOTE — DISCHARGE NOTE PROVIDER - CARE PROVIDERS DIRECT ADDRESSES
[email or web address]

## 2020-05-06 NOTE — PROGRESS NOTE ADULT - PROBLEM SELECTOR PLAN 2
- s/p RHC w/ biospy 2/20 w/ normal hemodynamics and neg for rejection  - on tacrolimus 5 mg BID; tac at goal 4-6  - cont cellcept 500 mg BID  - on high dose steroids due to recent hemolytic anemia  - cont atovaquone for PCP prophylaxis   - cont valcyte for CMV prophylaxis  - Continue Bactrim DS daily given history of nocardia infection (also PJP prophylaxis); K had been stable on Lokelma. - s/p RHC w/ biospy 2/20 w/ normal hemodynamics and neg for rejection  - Increase tacrolimus 5 mg BID to 6mg BID; Tac level had been drifting down. Goal 4-6  - cont Cellcept 500 mg BID  - on high dose steroids due to recent hemolytic anemia  - Cont atovaquone for PCP prophylaxis   - Cont valcyte for CMV prophylaxis  - Continue Bactrim DS daily given history of nocardia infection (also PJP prophylaxis)

## 2020-05-06 NOTE — DISCHARGE NOTE PROVIDER - PROVIDER TOKENS
PROVIDER:[TOKEN:[[id number]]],PROVIDER:[TOKEN:[[id number]],SCHEDULEDAPPT:[05/12/2020],SCHEDULEDAPPTTIME:[09:30 AM]],PROVIDER:[TOKEN:[[id number]]]

## 2020-05-06 NOTE — DISCHARGE NOTE PROVIDER - NSDCCPCAREPLAN_GEN_ALL_CORE_FT
PRINCIPAL DISCHARGE DIAGNOSIS  Diagnosis: Autoimmune hemolytic anemia  Assessment and Plan of Treatment: Shower daily  Weigh yourself daily  Continue current prescriptions as ordered  No added salt; low fat; low cholesterol, low salt diet.   Check your fingerstick glucose twice a day in the morning and afternoon and keep a record of the readings. Call Dr. Trevizo's office for any glucose readings > 200.   Follow-up with transplant cardiology, Dr. Stahl. Please call the office [phone] ahead of time to confirm appointment.   Follow-up with hematology, Dr. Isaacs, on 5/12/20 at 9:30 am. Please call the office [phone] ahead of time to confirm appointment.   Endocrinology, Dr. Trevizo, will be reaching out to you to schedule a telemedicine follow-up appointment. Please call the office [phone] for any questions or for any glucose readings > 200.      SECONDARY DISCHARGE DIAGNOSES  Diagnosis: Chest pain  Assessment and Plan of Treatment:     Diagnosis: Epigastric pain  Assessment and Plan of Treatment:

## 2020-05-06 NOTE — PROGRESS NOTE ADULT - PROBLEM SELECTOR PLAN 3
- cont insulin  - will need insulin administration counseling as he's going be on a high dose steroid regimen for a few months upon discharge -Continue with ISS (not requiring much)  -he can go home with BS monitoring kit and sliding scale. He will record and discuss with DR. Trevizo(Telehealth to be set up for next week)

## 2020-05-06 NOTE — DISCHARGE NOTE PROVIDER - NSDCFUADDAPPT_GEN_ALL_CORE_FT
Follow-up with transplant cardiology, Dr. Stahl. Please call the office [phone] ahead of time to confirm appointment.   Follow-up with hematology, Dr. Isaacs, on 5/12/20 at 9:30 am. Please call the office [phone] ahead of time to confirm appointment.   Endocrinology, Dr. Trevizo, will be reaching out to you to schedule a telemedicine follow-up appointment. Please call the office [phone] for any questions or for any glucose readings > 200.

## 2020-05-06 NOTE — PROGRESS NOTE ADULT - PROBLEM SELECTOR PLAN 1
Heme following   1u PRBC given 5/4 for H/H of 7.4/22.9  Continue Prednisone 75mg QD  Continue folic acid 1 mg QD   CMV, Hep B and Hep C PCRs negative   F/u Parvovirus B19 PCR - will require ID f/u if not resulted prior to discharge   Check CBC w/ diff, retic count, CMP, LDH and haptoglobin daily per heme  Dispo: home tomorrow w/ Heme and endo f/u

## 2020-05-06 NOTE — PROGRESS NOTE ADULT - SUBJECTIVE AND OBJECTIVE BOX
Subjective: no acute overnight events. Denies chest pain and abdominal pain have subsided.    Tele: ST 's           Current Meds:  acetaminophen   Tablet .. 650 milliGRAM(s) Oral every 6 hours PRN  aspirin enteric coated 81 milliGRAM(s) Oral daily  folic acid 1 milliGRAM(s) Oral daily  insulin lispro (HumaLOG) corrective regimen sliding scale   SubCutaneous three times a day before meals  insulin lispro (HumaLOG) corrective regimen sliding scale   SubCutaneous at bedtime  magnesium oxide 400 milliGRAM(s) Oral daily  mycophenolate mofetil 500 milliGRAM(s) Oral two times a day  nystatin    Suspension [id number] Unit(s) Oral <User Schedule>  pravastatin 20 milliGRAM(s) Oral at bedtime  predniSONE   Tablet 75 milliGRAM(s) Oral daily  sodium bicarbonate 650 milliGRAM(s) Oral two times a day  sodium chloride 0.9% lock flush 3 milliLiter(s) IV Push every 8 hours  sodium chloride 0.9%. 1000 milliLiter(s) IV Continuous <Continuous>  sodium zirconium cyclosilicate 10 Gram(s) Oral <User Schedule>  sodium zirconium cyclosilicate 10 Gram(s) Oral <User Schedule>  tacrolimus 5 milliGRAM(s) Oral <User Schedule>  trimethoprim  160 mG/sulfamethoxazole 800 mG 1 Tablet(s) Oral daily  valGANciclovir 450 milliGRAM(s) Oral daily      Vitals:  T(C): 36.5 (05-06-20 @ 04:16), Max: 36.7 (05-05-20 @ 12:10)  T(F): 97.7 (05-06-20 @ 04:16), Max: 98 (05-05-20 @ 12:10)  HR: 114 (05-06-20 @ 04:16) (114 - 119)  BP: 121/78 (05-06-20 @ 04:16) (121/78 - 134/87)  RR: 18 (05-06-20 @ 04:16) (18 - 18)  SpO2: 98% (05-06-20 @ 04:16) (98% - 100%)      I&O's Summary    05 May 2020 07:01  -  06 May 2020 07:00  --------------------------------------------------------  IN: 720 mL / OUT: 1225 mL / NET: -505 mL    Physical Exam:  Appearance: No acute distress; well appearing  Eyes: pink conjunctiva  HENT: Normal oral mucosa  Cardiovascular: RRR, S1, S2, IV/VI systolic murmur w/ no rubs, or gallops; normal JVP  Respiratory: Clear to auscultation bilaterally  Gastrointestinal: soft, non-tender, non-distended with normal bowel sounds  Musculoskeletal: No clubbing; no joint deformity   Neurologic: Non-focal  Psychiatry: AAOx3, mood & affect appropriate  Skin: No rashes, ecchymoses, or cyanosis                              7.4    7.82  )-----------( 254      ( 06 May 2020 06:10 )             22.9     05-06    139  |  105  |  40<H>  ----------------------------<  111<H>  4.3   |  22  |  1.49<H>    Ca    8.7      06 May 2020 06:10    TPro  7.4  /  Alb  3.9  /  TBili  2.2<H>  /  DBili  x   /  AST  20  /  ALT  13  /  AlkPhos  72  05-06      CARDIAC MARKERS ( 29 Apr 2020 17:52 )  40 ng/L / x     / x     / x     / x     / x      CARDIAC MARKERS ( 29 Apr 2020 14:26 )  36 ng/L / x     / x     / 115 U/L / x     / 2.0 ng/mL      Serum Pro-Brain Natriuretic Peptide: 522 pg/mL (04-29 @ 14:26)          tacrolimus   5 milliGRAM(s) Oral (05-06-20 @ 08:24)   5 milliGRAM(s) Oral (05-05-20 @ 21:31)   5 milliGRAM(s) Oral (05-05-20 @ 08:06)   5 milliGRAM(s) Oral (05-04-20 @ 20:25)                  Tacrolimus: 5.5 5/5  Tacrolimus (), Serum: 6.1 ng/mL (05-04-20 @ 08:53)  Tacrolimus (), Serum: 6.0 ng/mL (05-03-20 @ 09:03)  Tacrolimus (), Serum: 7.1 ng/mL (05-02-20 @ 08:59)  Tacrolimus (), Serum: 10.5 ng/mL (05-01-20 @ 07:47)  Tacrolimus (), Serum: 9.3 ng/mL (04-30-20 @ 08:37)  Tacrolimus (), Serum: 6.8 ng/mL (04-29-20 @ 16:04)

## 2020-05-06 NOTE — PROGRESS NOTE ADULT - SUBJECTIVE AND OBJECTIVE BOX
Subjective: "I'm good." Denies chest pain, SOB, palpitations, headache, dizziness, fever, chills and n/v/d.     Telemetry: ST 100s-120s, briefly up to 150      Vital Signs Last 24 Hrs  T(C): 36.5 (20 @ 04:16), Max: 36.7 (20 @ 12:10)  T(F): 97.7 (20 @ 04:16), Max: 98 (20 @ 12:10)  HR: 114 (20 @ 04:16) (114 - 119)  BP: 121/78 (20 @ 04:16) (121/78 - 134/87)  RR: 18 (20 @ 04:16) (18 - 18)  SpO2: 98% (20 @ 04:16) (98% - 100%)              07:01  -   @ 07:00  --------------------------------------------------------  IN: 720 mL / OUT: 1225 mL / NET: -505 mL     07:01  -   @ 11:53  --------------------------------------------------------  IN: 300 mL / OUT: 200 mL / NET: 100 mL      Daily Weight in k.2 (06 May 2020 08:18)      POCT Blood Glucose.: 113 mg/dL (06 May 2020 11:25)  POCT Blood Glucose.: 116 mg/dL (06 May 2020 07:52)  POCT Blood Glucose.: 156 mg/dL (05 May 2020 21:46)  POCT Blood Glucose.: 192 mg/dL (05 May 2020 17:08)          Coumadin    [ ] YES          [ x ] NO         Reason:                             7.4    7.82  )-----------( 254      ( 06 May 2020 06:10 )             22.9     05-06    139  |  105  |  40<H>  ----------------------------<  111<H>  4.3   |  22  |  1.49<H>    Ca    8.7      06 May 2020 06:10    TPro  7.4  /  Alb  3.9  /  TBili  2.2<H>  /  DBili  x   /  AST  20  /  ALT  13  /  AlkPhos  72  -      PHYSICAL EXAM:    Neurology: alert and oriented x 3, nonfocal, no gross deficits    CV: tachycardic, regular rhythm, +S1/S2, no murmur appreciated     Sternal Wound: old MSI well-healed    Lungs: CTA b/l, no wheezes, rales or rhonchi     Abdomen: soft, nontender, nondistended, (+) bowel sounds, (+) BM    : voiding freely              Extremities: no LE edema b/l, no calf tenderness, PPP b/l        acetaminophen Tablet 650 milliGRAM(s) Oral every 6 hours PRN  aspirin enteric coated 81 milliGRAM(s) Oral daily  folic acid 1 milliGRAM(s) Oral daily  insulin lispro (HumaLOG) corrective regimen sliding scale   SubCutaneous three times a day before meals  insulin lispro (HumaLOG) corrective regimen sliding scale   SubCutaneous at bedtime  magnesium oxide 400 milliGRAM(s) Oral daily  mycophenolate mofetil 500 milliGRAM(s) Oral two times a day  nystatin Suspension 014921 Unit(s) Oral <User Schedule>  pravastatin 20 milliGRAM(s) Oral at bedtime  predniSONE Tablet 75 milliGRAM(s) Oral daily  sodium bicarbonate 650 milliGRAM(s) Oral two times a day  sodium chloride 0.9% lock flush 3 milliLiter(s) IV Push every 8 hours  sodium chloride 0.9%. 1000 milliLiter(s) IV Continuous <Continuous>  sodium zirconium cyclosilicate 10 Gram(s) Oral <User Schedule>  sodium zirconium cyclosilicate 10 Gram(s) Oral <User Schedule>  tacrolimus 5 milliGRAM(s) Oral <User Schedule>  trimethoprim 160 mG/sulfamethoxazole 800 mG 1 Tablet(s) Oral daily  valGANciclovir 450 milliGRAM(s) Oral daily       Discussed with Cardiothoracic Team at AM rounds.

## 2020-05-06 NOTE — CHART NOTE - NSCHARTNOTEFT_GEN_A_CORE
HEMATOLOGY NOTE    Patient's labs reviewed, hemoglobin 7.4 this morning.  Hemolysis labs show improving, but continued hemolysis.   Would recommend checking type and screen ( from ) and giving 1 unit pRBC prior to discharge.     If possible, can do CBC check on Friday.  Patient has only had a few days of steroids and can take a few weeks for complete effect.   Continue prednisone 75mg daily this week and decrease to 70mg daily next week.     Patient has confirmed appointment with Dr. Anh Isaacs on  at 9:30am.     Discussed with Dr. Jha (HF/Transplant fellow).         Isabelle Trinidad MD  Hematology/Oncology Fellow, PGY-5  pager: [phone]  After 5pm or on weekends, please page the on-call fellow.

## 2020-05-06 NOTE — PROGRESS NOTE ADULT - SUBJECTIVE AND OBJECTIVE BOX
Follow Up: Post-OHT, hemolytic anemia    Interval History/ROS:Patient is a 70y old  Male who presents with a chief complaint of chest pain and abdominal pain (06 May 2020 12:48)  - pt assessed at bedside. Denied any acute complaints  - labs today showed Hb 7.4    Allergies    No Known Allergies    Intolerances        ANTIMICROBIALS:  nystatin    Suspension [id number] <User Schedule>  trimethoprim  160 mG/sulfamethoxazole 800 mG 1 daily  valGANciclovir 450 daily      OTHER MEDS:  acetaminophen   Tablet .. 650 milliGRAM(s) Oral every 6 hours PRN  aspirin enteric coated 81 milliGRAM(s) Oral daily  folic acid 1 milliGRAM(s) Oral daily  insulin lispro (HumaLOG) corrective regimen sliding scale   SubCutaneous three times a day before meals  insulin lispro (HumaLOG) corrective regimen sliding scale   SubCutaneous at bedtime  magnesium oxide 400 milliGRAM(s) Oral daily  mycophenolate mofetil 500 milliGRAM(s) Oral two times a day  pravastatin 20 milliGRAM(s) Oral at bedtime  predniSONE   Tablet 75 milliGRAM(s) Oral daily  sodium bicarbonate 650 milliGRAM(s) Oral two times a day  sodium chloride 0.9% lock flush 3 milliLiter(s) IV Push every 8 hours  sodium chloride 0.9%. 1000 milliLiter(s) IV Continuous <Continuous>  sodium zirconium cyclosilicate 10 Gram(s) Oral <User Schedule>  sodium zirconium cyclosilicate 10 Gram(s) Oral <User Schedule>  tacrolimus 6 milliGRAM(s) Oral <User Schedule>      Vital Signs Last 24 Hrs  T(C): 36.7 (06 May 2020 11:53), Max: 36.7 (06 May 2020 11:53)  T(F): 98 (06 May 2020 11:53), Max: 98 (06 May 2020 11:53)  HR: 114 (06 May 2020 11:53) (114 - 119)  BP: 118/81 (06 May 2020 11:53) (118/81 - 134/87)  BP(mean): --  RR: 18 (06 May 2020 11:53) (18 - 18)  SpO2: 98% (06 May 2020 04:16) (98% - 100%)    EXAM:  Constitutional: Not in acute distress  Eyes: No icterus. Pupils b/l reactive to light.  Oral cavity: Clear, no lesions  Neck: No neck vein distension noted  RS: Chest clear to auscultation bilaterally. No wheeze/rhonchi/crepitations.  CVS: S1, S2 heard. tachycardic. No murmurs/rubs/gallops.  Abdomen: Soft. No guarding/rigidity/tenderness. Multiple abdominal scars noted.  : No acute abnormalities  Extremities: Warm. No pedal edema  Skin: No lesions noted  Vascular: No evidence of phlebitis  Neuro: Alert, oriented to time/place/person                        7.4    7.82  )-----------( 254      ( 06 May 2020 06:10 )             22.9       05-06    139  |  105  |  40<H>  ----------------------------<  111<H>  4.3   |  22  |  1.49<H>    Ca    8.7      06 May 2020 06:10    TPro  7.4  /  Alb  3.9  /  TBili  2.2<H>  /  DBili  x   /  AST  20  /  ALT  13  /  AlkPhos  72  05-06

## 2020-05-06 NOTE — PROGRESS NOTE ADULT - ASSESSMENT
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 Brought in for concerns of COVID infection which returned negative. 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  Currently being treated with high dose of prednisone and waiting for various viral PCR's to determine the etiology. There had been case reports of hemolytic anemia secondary to calcineurin inhibitors and various viruses including CMV, Parvovirus B 19, HHV, EBV etc. For now we'll check viral PCR's. I've discussed his case with hematology. Suspicion for post-transplant lymphoproliferative disorder is low given no prominent lymph notes on CT scan.      INCOMPLETE NOTE     continued close follow up with Heme and ID as outpatient. Symptoms mostly resolved. Hemodynamically stable.      Kely Jha D.O.  Adv. 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 Brought in for concerns of COVID infection which returned negative. 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  Currently being treated with high dose of prednisone and waiting for various viral PCR's to determine the etiology. There had been case reports of hemolytic anemia secondary to calcineurin inhibitors and various viruses including CMV, Parvovirus B 19, HHV, EBV etc. For now we'll check viral PCR's. I've discussed his case with hematology. Suspicion for post-transplant lymphoproliferative disorder is low given no prominent lymph notes on CT scan.      Awaiting type and screen and one unit of PRBC today. Can be discharged afterwards if transportation can be arranged for later.      Kely Jha D.O.  Adv. HF and transplant fellow  [phone]

## 2020-05-06 NOTE — PROGRESS NOTE ADULT - PROBLEM SELECTOR PLAN 1
H/H is drifting down. Transfused 5/2 and responded appropriately  - cont prednisone 75 mg daily (Heme plans to decrease prednisone by 10mg weekly (slow taper), Follow up next week for further taper plan)   - transfuse for Hgb<7  -Awaiting CMV, Parvovirus B-19, EBV, and HCV PCR's  (HIV negative; Hep B not detected). Would need ID follow up if all of the results don't come back before discharge tomorrow. H/H is drifting down since the last transfusion 5/2. TBili and LDH trended down.  -Discussed with hematology to transfuse one more unit and repeat CBC on Friday as outpatient.  -Cont prednisone 75 mg daily (Heme plans to decrease prednisone by 10mg weekly (slow taper), Follow up next week for further taper plan)   - transfuse for Hgb<7  -Awaiting Parvovirus B-19 PCR (EBV, HBV, HCV, and HIV PCR's neg) Would need ID follow up if all of the results don't come back before discharge.

## 2020-05-06 NOTE — PROGRESS NOTE ADULT - ASSESSMENT
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  P/w chest pain and abdominal pain - concern for cholelithiasis. Noted to have hemolytic anemia  ==========  1. Abdominal pain  - Gall stone seen on CT and US but without evidence of acute cholecystitis. Will likely need cholecystectomy in the future  - Also noted was gastric antrum finding (thickening?) - should undergo an endoscopy as outpatient  -----------  2. Hemolytic anemia  - Jacky shows IgG Ab+. Elevated tara, LDH, and low hapto. Smear shows no schistocytes and + microspherocytes. Not TTP/HUS. Consistent with hemolytic anemia.   - to continue steroids (per Heme)  - HCV, HBV PCR negative. HIV NR. CMV PCR negative  - Parvovirus PCR pending - to be followed up as an outpatient  -------------  3. OI prophylaxis for post-OHT  - Valganciclovir 450mg/day  - Bactrim DS daily    KELLY Rosa, MD  Fellow, Infectious Diseases  Pager: [phone]  After 5pm and On weekends: Call [phone]

## 2020-05-06 NOTE — PROGRESS NOTE ADULT - ASSESSMENT
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 Per patient, has had chest pain intermittently since surgery but improves with tylenol prn (takes 1x/week) but this was more severe so contacted transplant coordinator. Also had checked temp at home and found to be 95. Denies any sick contacts, fevers/chills/NS/diarrhea. Due to concern of coronavirus infection was instructed to come to ER which he did via EMS. On arrival, VS were 98, , /94, 99% RA. Initial labs revealed leukopenia (with low lymphocyte), Hb 8.1 (lower than prior), Tbili 2.6 (prev 1.3 2/12). Reports pain is gone now. Abd US showed gallstone and sludge, without wall edema. Labs notable for lower Hb, and elevated TBili which is likely 2/2 cholelithiasis.  4/30 Covid PCR repeated. Pending GI/Gen. surgery consult appreciated. CT A/P with IV and PO contrast to further evaluate patients abdominal pain. Ck dimer, LDH, haptoglobin. IVF 75ml/hr x 6hrs prior to and post CT. Ck guiac  5/1 CTabd demonstrates Cholelithiasis without any findings to suggest acute cholecystitis or any biliary obstruction.  5/2 Transfuse 1uprbc h/h 6/21 Continue with prednisone for autoimmune hemolytic anemia and ISS for hyperglycemia. Increase Lokelma dosing for hyperkalemia 6.1    5/3 H/H stable. Hyperkalemia resolved. Continue with present management.  5/4: VSS, H/H stable. Hyperkalemia resolved - currently on 10g Lokelma QD. Continue prednisone 75mg QD and folic acid 1 mg QD per heme. F/u CMV PCR and Hep C per heme recs. GI recommending UGIS and outpt EGD for further evaluation of questionable gastric mural thickening. Will d/w transplant team.   5/5: VSS, K stable on Lokelma 10g QD. Bactrim DS started yesterday given h/o nocardia. Mepron d/c'd 5/4 per transplant team. CMV PCR, Hep C PCR and Parvovirus B19 PCR pending per heme. Today's tacro level pending. EMELY SANCHEZ stable on Lokelma 10g QD. Per transplant team 1u PRBC given for H/H of 7.4/22.9. CMV and Hep C PCRs negative, Parvovirus B19 PCR pending. Tacro level 4.4, tacro dosing per transplant team.   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 Per patient, has had chest pain intermittently since surgery but improves with tylenol prn (takes 1x/week) but this was more severe so contacted transplant coordinator. Also had checked temp at home and found to be 95. Denies any sick contacts, fevers/chills/NS/diarrhea. Due to concern of coronavirus infection was instructed to come to ER which he did via EMS. On arrival, VS were 98, , /94, 99% RA. Initial labs revealed leukopenia (with low lymphocyte), Hb 8.1 (lower than prior), Tbili 2.6 (prev 1.3 2/12). Reports pain is gone now. Abd US showed gallstone and sludge, without wall edema. Labs notable for lower Hb, and elevated TBili which is likely 2/2 cholelithiasis.  4/30 Covid PCR repeated. Pending GI/Gen. surgery consult appreciated. CT A/P with IV and PO contrast to further evaluate patients abdominal pain. Ck dimer, LDH, haptoglobin. IVF 75ml/hr x 6hrs prior to and post CT. Ck guiac  5/1 CTabd demonstrates Cholelithiasis without any findings to suggest acute cholecystitis or any biliary obstruction.  5/2 Transfuse 1uprbc h/h 6/21 Continue with prednisone for autoimmune hemolytic anemia and ISS for hyperglycemia. Increase Lokelma dosing for hyperkalemia 6.1    5/3 H/H stable. Hyperkalemia resolved. Continue with present management.  5/4: VSS, H/H stable. Hyperkalemia resolved - currently on 10g Lokelma QD. Continue prednisone 75mg QD and folic acid 1 mg QD per heme. F/u CMV PCR and Hep C per heme recs. GI recommending UGIS and outpt EGD for further evaluation of questionable gastric mural thickening. Will d/w transplant team.   5/5: VSS, K stable on Lokelma 10g QD. Bactrim DS started yesterday given h/o nocardia. Mepron d/c'd 5/4 per transplant team. CMV PCR, Hep C PCR and Parvovirus B19 PCR pending per heme. Today's tacro level pending.   5/6: EMELY SANCHEZ stable on Lokelma 10g QD. Per transplant team 1u PRBC given for H/H of 7.4/22.9. CMV and Hep C PCRs negative, Parvovirus B19 PCR pending. Tacro level 4.4, tacro dosing per transplant team.   Discharge planning: home tomorrow w/ Heme and endo f/u, will require ID f/u if Parvovirus PCR does not result before discharge

## 2020-05-06 NOTE — DISCHARGE NOTE PROVIDER - CARE PROVIDER_API CALL
Edil Stahl (MD; MPH)  Adv [facility] F[facility]ns[facility] Cardio; Cardiology  [address] [place] [address]  Phone: [phone]  Fax: [phone]  Follow Up Time:     Anh Isaacs (MD)  Hematology; Internal Medicine; Medical Oncology  [facility], [address] [place] [address]  Phone: [phone]  Fax: [phone]  Scheduled Appointment: 05/12/2020 09:30 AM    Na Trevizo)  EndocrinologyMetabDiabetes; Internal Medicine  [address] [facility], [facility] [address]  [place] [address]  Phone: [phone]  Fax: [phone]  Follow Up Time:

## 2020-05-06 NOTE — PROGRESS NOTE ADULT - PROBLEM SELECTOR PLAN 4
- improved   - cont lokelma 10 GM BID - improved   - cont lokelma 10 GM BID  -Need repeat chem as outpatient for close monitoring - stable  - cont lokelma 10 GM BID  -Need repeat chem as outpatient for close monitoring

## 2020-05-06 NOTE — DISCHARGE NOTE PROVIDER - NSDCFUADDINST_GEN_ALL_CORE_FT
Check fingerstick glucose twice a day in the mornings and afternoons and keep a record of the results. Please call Dr. Trevizo's office for any glucose readings > 200.

## 2020-05-06 NOTE — DISCHARGE NOTE PROVIDER - HOSPITAL COURSE
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 Per patient, has had chest pain intermittently since surgery but improves with tylenol prn (takes 1x/week) but this was more severe so contacted transplant coordinator. Also had checked temp at home and found to be 95. Denies any sick contacts, fevers/chills/NS/diarrhea. Due to concern of coronavirus infection was instructed to come to ER which he did via EMS. On arrival, VS were 98, , /94, 99% RA. Initial labs revealed leukopenia (with low lymphocyte), Hb 8.1 (lower than prior), Tbili 2.6 (prev 1.3 2/12). Reports pain is gone now. Abd US showed gallstone and sludge, without wall edema. Labs notable for lower Hb, and elevated TBili which is likely 2/2 cholelithiasis.    4/30 Covid PCR repeated. Pending GI/Gen. surgery consult appreciated. CT A/P with IV and PO contrast to further evaluate patients abdominal pain. Ck dimer, LDH, haptoglobin. IVF 75ml/hr x 6hrs prior to and post CT. Ck guiac    5/1 CTabd demonstrates Cholelithiasis without any findings to suggest acute cholecystitis or any biliary obstruction.    5/2 Transfuse 1uprbc h/h 6/21 Continue with prednisone for autoimmune hemolytic anemia and ISS for hyperglycemia. Increase Lokelma dosing for hyperkalemia 6.1      5/3 H/H stable. Hyperkalemia resolved. Continue with present management.    5/4: VSS, H/H stable. Hyperkalemia resolved - currently on 10g Lokelma QD. Continue prednisone 75mg QD and folic acid 1 mg QD per heme. F/u CMV PCR and Hep C per heme recs. GI recommending UGIS and outpt EGD for further evaluation of questionable gastric mural thickening. Will d/w transplant team.     5/5: VSS, K stable on Lokelma 10g QD. Bactrim DS started yesterday given h/o nocardia. Mepron d/c'd 5/4 per transplant team. CMV PCR, Hep C PCR and Parvovirus B19 PCR pending per heme. Today's tacro level pending.     5/6: LAURA K stable on Lokelma 10g QD. Per transplant team 1u PRBC given for H/H of 7.4/22.9. CMV and Hep C PCRs negative, Parvovirus B19 PCR pending. Tacro level 4.4, tacro dosing per transplant team. Plan to discharge home tomorrow 5/7/20. 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 Per patient, has had chest pain intermittently since surgery but improves with tylenol prn (takes 1x/week) but this was more severe so contacted transplant coordinator. Also had checked temp at home and found to be 95. Denies any sick contacts, fevers/chills/NS/diarrhea. Due to concern of coronavirus infection was instructed to come to ER which he did via EMS. On arrival, VS were 98, , /94, 99% RA. Initial labs revealed leukopenia (with low lymphocyte), Hb 8.1 (lower than prior), Tbili 2.6 (prev 1.3 2/12). Reports pain is gone now. Abd US showed gallstone and sludge, without wall edema. Labs notable for lower Hb, and elevated TBili which is likely 2/2 cholelithiasis.    4/30 Covid PCR repeated. Pending GI/Gen. surgery consult appreciated. CT A/P with IV and PO contrast to further evaluate patients abdominal pain. Ck dimer, LDH, haptoglobin. IVF 75ml/hr x 6hrs prior to and post CT. Ck guiac    5/1 CTabd demonstrates Cholelithiasis without any findings to suggest acute cholecystitis or any biliary obstruction.    5/2 Transfuse 1uprbc h/h 6/21 Continue with prednisone for autoimmune hemolytic anemia and ISS for hyperglycemia. Increase Lokelma dosing for hyperkalemia 6.1      5/3 H/H stable. Hyperkalemia resolved. Continue with present management.    5/4: VSS, H/H stable. Hyperkalemia resolved - currently on 10g Lokelma QD. Continue prednisone 75mg QD and folic acid 1 mg QD per heme. F/u CMV PCR and Hep C per heme recs. GI recommending UGIS and outpt EGD for further evaluation of questionable gastric mural thickening. Will d/w transplant team.     5/5: VSS, K stable on Lokelma 10g QD. Bactrim DS started yesterday given h/o nocardia. Mepron d/c'd 5/4 per transplant team. CMV PCR, Hep C PCR and Parvovirus B19 PCR pending per heme. Today's tacro level pending.     5/6: VSS, K stable on Lokelma 10g QD. Per transplant team 1u PRBC given for H/H of 7.4/22.9. CMV and Hep C PCRs negative, Parvovirus B19 PCR pending. Tacro level 4.4, tacro dosing per transplant team.     5/7: VSS; Plan to discharge to home today. H/H 9/30.1

## 2020-05-06 NOTE — DISCHARGE NOTE PROVIDER - NSDCMRMEDTOKEN_GEN_ALL_CORE_FT
aspirin 81 mg oral delayed release tablet: 1 tab(s) orally once a day  Calcium+D 315-200 MG-Unit: 2 tab(s) orally 2 times a day  docusate potassium 100 mg oral capsule: 2 tab(s) orally once a day (at bedtime)  magnesium oxide 400 mg (241.3 mg elemental magnesium) oral tablet: 1 tab(s) orally once a day  mycophenolate mofetil 250 mg oral capsule: 2 cap(s) orally 2 times a day  pravastatin 20 mg oral tablet: 1 tab(s) orally once a day (at bedtime)  sodium bicarbonate 650 mg oral tablet: 1 tab(s) orally 2 times a day  tacrolimus 1 mg oral capsule: 5 cap(s) orally 2 times a day

## 2020-05-07 ENCOUNTER — TRANSCRIPTION ENCOUNTER (OUTPATIENT)
Age: 70
End: 2020-05-07

## 2020-05-07 VITALS — WEIGHT: 168.21 LBS

## 2020-05-07 LAB
ALBUMIN SERPL ELPH-MCNC: 3.8 G/DL — SIGNIFICANT CHANGE UP (ref 3.3–5)
ALP SERPL-CCNC: 90 U/L — SIGNIFICANT CHANGE UP (ref 40–120)
ALT FLD-CCNC: 12 U/L — SIGNIFICANT CHANGE UP (ref 10–45)
ANION GAP SERPL CALC-SCNC: 12 MMOL/L — SIGNIFICANT CHANGE UP (ref 5–17)
AST SERPL-CCNC: 18 U/L — SIGNIFICANT CHANGE UP (ref 10–40)
B19V DNA FLD QL NAA+PROBE: SIGNIFICANT CHANGE UP IU/ML
B19V DNA FLD QL NAA+PROBE: SIGNIFICANT CHANGE UP IU/ML
BASOPHILS # BLD AUTO: 0.01 K/UL — SIGNIFICANT CHANGE UP (ref 0–0.2)
BASOPHILS NFR BLD AUTO: 0.1 % — SIGNIFICANT CHANGE UP (ref 0–2)
BILIRUB SERPL-MCNC: 3.9 MG/DL — HIGH (ref 0.2–1.2)
BUN SERPL-MCNC: 42 MG/DL — HIGH (ref 7–23)
CALCIUM SERPL-MCNC: 9.3 MG/DL — SIGNIFICANT CHANGE UP (ref 8.4–10.5)
CHLORIDE SERPL-SCNC: 103 MMOL/L — SIGNIFICANT CHANGE UP (ref 96–108)
CO2 SERPL-SCNC: 21 MMOL/L — LOW (ref 22–31)
CREAT SERPL-MCNC: 1.41 MG/DL — HIGH (ref 0.5–1.3)
D DIMER BLD IA.RAPID-MCNC: 240 NG/ML DDU — HIGH
EOSINOPHIL # BLD AUTO: 0.01 K/UL — SIGNIFICANT CHANGE UP (ref 0–0.5)
EOSINOPHIL NFR BLD AUTO: 0.1 % — SIGNIFICANT CHANGE UP (ref 0–6)
FIBRINOGEN PPP-MCNC: 281 MG/DL — LOW (ref 350–510)
GLUCOSE BLDC GLUCOMTR-MCNC: 117 MG/DL — HIGH (ref 70–99)
GLUCOSE BLDC GLUCOMTR-MCNC: 172 MG/DL — HIGH (ref 70–99)
GLUCOSE SERPL-MCNC: 116 MG/DL — HIGH (ref 70–99)
HAPTOGLOB SERPL-MCNC: <20 MG/DL — LOW (ref 34–200)
HCT VFR BLD CALC: 30.1 % — LOW (ref 39–50)
HGB BLD-MCNC: 9.7 G/DL — LOW (ref 13–17)
IMM GRANULOCYTES NFR BLD AUTO: 0.4 % — SIGNIFICANT CHANGE UP (ref 0–1.5)
LDH SERPL L TO P-CCNC: 349 U/L — HIGH (ref 50–242)
LYMPHOCYTES # BLD AUTO: 1.25 K/UL — SIGNIFICANT CHANGE UP (ref 1–3.3)
LYMPHOCYTES # BLD AUTO: 16.3 % — SIGNIFICANT CHANGE UP (ref 13–44)
MCHC RBC-ENTMCNC: 30.1 PG — SIGNIFICANT CHANGE UP (ref 27–34)
MCHC RBC-ENTMCNC: 32.2 GM/DL — SIGNIFICANT CHANGE UP (ref 32–36)
MCV RBC AUTO: 93.5 FL — SIGNIFICANT CHANGE UP (ref 80–100)
MONOCYTES # BLD AUTO: 0.51 K/UL — SIGNIFICANT CHANGE UP (ref 0–0.9)
MONOCYTES NFR BLD AUTO: 6.6 % — SIGNIFICANT CHANGE UP (ref 2–14)
NEUTROPHILS # BLD AUTO: 5.86 K/UL — SIGNIFICANT CHANGE UP (ref 1.8–7.4)
NEUTROPHILS NFR BLD AUTO: 76.5 % — SIGNIFICANT CHANGE UP (ref 43–77)
NRBC # BLD: 0 /100 WBCS — SIGNIFICANT CHANGE UP (ref 0–0)
PLATELET # BLD AUTO: 235 K/UL — SIGNIFICANT CHANGE UP (ref 150–400)
POTASSIUM SERPL-MCNC: 4 MMOL/L — SIGNIFICANT CHANGE UP (ref 3.5–5.3)
POTASSIUM SERPL-SCNC: 4 MMOL/L — SIGNIFICANT CHANGE UP (ref 3.5–5.3)
PROT SERPL-MCNC: 7.6 G/DL — SIGNIFICANT CHANGE UP (ref 6–8.3)
RBC # BLD: 3.22 M/UL — LOW (ref 4.2–5.8)
RBC # BLD: 3.22 M/UL — LOW (ref 4.2–5.8)
RBC # FLD: 16.8 % — HIGH (ref 10.3–14.5)
RETICS #: 43.1 K/UL — SIGNIFICANT CHANGE UP (ref 25–125)
RETICS/RBC NFR: 1.3 % — SIGNIFICANT CHANGE UP (ref 0.5–2.5)
SODIUM SERPL-SCNC: 136 MMOL/L — SIGNIFICANT CHANGE UP (ref 135–145)
TACROLIMUS SERPL-MCNC: 6.3 NG/ML — SIGNIFICANT CHANGE UP
WBC # BLD: 7.67 K/UL — SIGNIFICANT CHANGE UP (ref 3.8–10.5)
WBC # FLD AUTO: 7.67 K/UL — SIGNIFICANT CHANGE UP (ref 3.8–10.5)

## 2020-05-07 PROCEDURE — 99233 SBSQ HOSP IP/OBS HIGH 50: CPT | Mod: GC

## 2020-05-07 RX ORDER — DOCUSATE SODIUM 100 MG
2 CAPSULE ORAL
Qty: 0 | Refills: 0 | DISCHARGE

## 2020-05-07 RX ORDER — MYCOPHENOLATE MOFETIL 250 MG/1
2 CAPSULE ORAL
Qty: 0 | Refills: 0 | DISCHARGE

## 2020-05-07 RX ADMIN — SODIUM CHLORIDE 3 MILLILITER(S): 9 INJECTION INTRAMUSCULAR; INTRAVENOUS; SUBCUTANEOUS at 05:21

## 2020-05-07 RX ADMIN — SODIUM ZIRCONIUM CYCLOSILICATE 10 GRAM(S): 10 POWDER, FOR SUSPENSION ORAL at 13:24

## 2020-05-07 RX ADMIN — Medication 2: at 11:41

## 2020-05-07 RX ADMIN — SODIUM CHLORIDE 3 MILLILITER(S): 9 INJECTION INTRAMUSCULAR; INTRAVENOUS; SUBCUTANEOUS at 13:24

## 2020-05-07 RX ADMIN — MAGNESIUM OXIDE 400 MG ORAL TABLET 400 MILLIGRAM(S): 241.3 TABLET ORAL at 11:34

## 2020-05-07 RX ADMIN — TACROLIMUS 6 MILLIGRAM(S): 5 CAPSULE ORAL at 07:42

## 2020-05-07 RX ADMIN — Medication 650 MILLIGRAM(S): at 05:55

## 2020-05-07 RX ADMIN — Medication 81 MILLIGRAM(S): at 11:34

## 2020-05-07 RX ADMIN — VALGANCICLOVIR 450 MILLIGRAM(S): 450 TABLET, FILM COATED ORAL at 11:34

## 2020-05-07 RX ADMIN — Medication 500000 UNIT(S): at 07:42

## 2020-05-07 RX ADMIN — Medication 1 MILLIGRAM(S): at 11:34

## 2020-05-07 RX ADMIN — Medication 75 MILLIGRAM(S): at 05:55

## 2020-05-07 RX ADMIN — Medication 500000 UNIT(S): at 11:34

## 2020-05-07 RX ADMIN — MYCOPHENOLATE MOFETIL 500 MILLIGRAM(S): 250 CAPSULE ORAL at 05:55

## 2020-05-07 RX ADMIN — Medication 1 TABLET(S): at 11:34

## 2020-05-07 NOTE — DISCHARGE NOTE NURSING/CASE MANAGEMENT/SOCIAL WORK - PATIENT PORTAL LINK FT
You can access the FollowMyHealth Patient Portal offered by [facility] by registering at the following website: [email or web address]. By joining [facility]’s FollowMyHealth portal, you will also be able to view your health information using other applications (apps) compatible with our system.

## 2020-05-07 NOTE — PROGRESS NOTE ADULT - PROBLEM SELECTOR PLAN 3
-Continue with ISS (not requiring much)  -he can go home with BS monitoring kit and sliding scale. He will record and discuss with DR. Trevizo(Telehealth to be set up for next week)

## 2020-05-07 NOTE — PROGRESS NOTE ADULT - PROBLEM SELECTOR PROBLEM 3
S/P orthotopic heart transplant
Hyperglycemia, drug-induced
Hyperkalemia
S/P orthotopic heart transplant

## 2020-05-07 NOTE — PROGRESS NOTE ADULT - REASON FOR ADMISSION
abdominal pain
abdominal pain
chest pain
chest pain
hypothermia
abdominal pain
abdominal pain
CP and abd pain
abd pain, cp
chest pain/abdominal pain
chest pain

## 2020-05-07 NOTE — DISCHARGE NOTE NURSING/CASE MANAGEMENT/SOCIAL WORK - NSDCFUADDAPPT_GEN_ALL_CORE_FT
Follow-up with transplant cardiology, Dr. Stahl. Please call the office [phone] ahead of time to confirm appointment.   Follow-up with hematology, Dr. Isaacs, on 5/12/20 at 9:30 am. Please call the office [phone] ahead of time to confirm appointment.   Endocrinology, Dr. Trevizo, will be reaching out to you to schedule a telemedicine follow-up appointment. Please call the office [phone] for any questions or for any glucose readings > 200.

## 2020-05-07 NOTE — PROGRESS NOTE ADULT - PROBLEM SELECTOR PLAN 1
H/H is drifting down since the last transfusion 5/2. TBili and LDH trended down.  -Discussed with hematology to transfuse one more unit and repeat CBC on Friday as outpatient.  -Cont prednisone 75 mg daily (Heme plans to decrease prednisone by 10mg weekly (slow taper), Follow up next week for further taper plan)   - transfuse for Hgb<7  -Awaiting Parvovirus B-19 PCR (EBV, HBV, HCV, and HIV PCR's neg) Would need ID follow up if all of the results don't come back before discharge. -Responded appropriately to 2 U PRBC (although unclear why he was given two units)  -Cont prednisone 75 mg daily ([facility] plans to decrease prednisone by 10mg weekly (slow taper), Follow up with [facility] next week for further taper plan)   -Parvovirus B-19 PCR EBV, HBV, HCV, and HIV PCR's have been neg.   -Repeat labs next week

## 2020-05-07 NOTE — PROGRESS NOTE ADULT - COVID-19 NEGATIVE LAB RESULT
COVID-19 ruled out

## 2020-05-07 NOTE — PROGRESS NOTE ADULT - ATTENDING COMMENTS
Patient seen and examined with Dr. Caruso    I agree with his interval history exam and plans as noted above    Stable from an ID perspective but with persistent hemolytic anemia requiring transfusions  follow up outstanding lab results- viral studies  continue high dose steroids  continue OI prophylaxis    Gary Lujan MD  [phone]  After 5pm/weekends [phone]
Patient seen and examined with Dr. Shady GUDINO agree with his interval history exam and plans as noted above    Abdominal tenderness on exam has resolved  hemolytic anemia- etiologic investigation in progress    Gary Lujan MD  [phone]  After 5pm/weekends [phone]
69 yo male with history of OHT in 2018 admitted for new autoiummune hemolytic anemia. Hemolysis improving on prednisone and 1 unit PRBCs. Unclear etiology, will continue infectious work up and look for signs of a PTLD. Could be medication related (tacrolimus can cause) though historically this is usually seen within the first year of transplant. Peripheral smear was not consistent with a TMA. Has outpatient follow up arranged with Dr. Isaacs.       Tressa Murphy MD   Hematology/Oncology
As above.    Abd pain, chronic, unrelated to food.  Has some exertional component.  Abnormal CT scan of stomach, likely underdistention.    Recommend:  Upper GI series
69 yo male with history of OHT in 2018 admitted for new autoiummune hemolytic anemia. Hemolysis improving on prednisone. Unclear etiology, will continue infectious work up and look for signs of a PTLD. Could be medication related (tacrolimus can cause) though historically this is usually seen within the first year of transplant. Peripheral smear was not consistent with a TMA.  Will continue to follow.     Tressa Murphy MD   Hematology/Oncology
We will repeat labs at beginning of next week to monitor immunosuppression and hemolytic anemia.     Please call me with questions at [phone].
We will change to Bactrim given history in the past of Nocardia. Please call me with questions at [phone].
Will receive blood today. Possible discharge tomorrow. Increasing tac levels.
Steroid taper per hematology. Will adjust tacrolimus as necessary.     Please call me with questions at [phone].

## 2020-05-07 NOTE — PROGRESS NOTE ADULT - PROBLEM SELECTOR PLAN 2
- s/p RHC w/ biospy 2/20 w/ normal hemodynamics and neg for rejection  - Continue tacrolimus 6mg BID; pending result today. Goal 4-6  - cont Cellcept 500 mg BID  - on high dose steroids due to recent hemolytic anemia  - Cont atovaquone for PCP prophylaxis   - Cont valcyte for CMV prophylaxis  - Continue Bactrim DS daily given history of nocardia infection (also PJP prophylaxis)

## 2020-05-07 NOTE — DISCHARGE NOTE NURSING/CASE MANAGEMENT/SOCIAL WORK - NSDCVIVACCINE_GEN_ALL_CORE_FT
HepA , 2018/2/7 09:02 , Kathleen Reyes (RN)  Hepatitis B , 2018/2/7 09:09 , Kathleen Reyes (RN)  Hepatitis B , 2018/2/16 19:00 , Brooklynn Coles (RN)

## 2020-05-07 NOTE — PROGRESS NOTE ADULT - SUBJECTIVE AND OBJECTIVE BOX
Subjective: no acute overnight events.     Tele: ST 's             Current Meds:  acetaminophen   Tablet .. 650 milliGRAM(s) Oral every 6 hours PRN  aspirin enteric coated 81 milliGRAM(s) Oral daily  folic acid 1 milliGRAM(s) Oral daily  insulin lispro (HumaLOG) corrective regimen sliding scale   SubCutaneous three times a day before meals  insulin lispro (HumaLOG) corrective regimen sliding scale   SubCutaneous at bedtime  magnesium oxide 400 milliGRAM(s) Oral daily  mycophenolate mofetil 500 milliGRAM(s) Oral two times a day  nystatin    Suspension 506820 Unit(s) Oral <User Schedule>  pravastatin 20 milliGRAM(s) Oral at bedtime  predniSONE   Tablet 75 milliGRAM(s) Oral daily  sodium bicarbonate 650 milliGRAM(s) Oral two times a day  sodium chloride 0.9% lock flush 3 milliLiter(s) IV Push every 8 hours  sodium chloride 0.9%. 1000 milliLiter(s) IV Continuous <Continuous>  sodium zirconium cyclosilicate 10 Gram(s) Oral <User Schedule>  sodium zirconium cyclosilicate 10 Gram(s) Oral <User Schedule>  tacrolimus 6 milliGRAM(s) Oral <User Schedule>  trimethoprim  160 mG/sulfamethoxazole 800 mG 1 Tablet(s) Oral daily  valGANciclovir 450 milliGRAM(s) Oral daily      Vitals:  T(C): 36.8 (05-07-20 @ 04:10), Max: 36.9 (05-06-20 @ 20:45)  T(F): 98.2 (05-07-20 @ 04:10), Max: 98.4 (05-06-20 @ 20:45)  HR: 114 (05-07-20 @ 04:10) (114 - 120)  BP: 126/86 (05-07-20 @ 04:10) (118/81 - 137/87)  RR: 18 (05-07-20 @ 04:10) (17 - 18)  SpO2: 100% (05-07-20 @ 04:10) (97% - 100%)      I&O's Summary    06 May 2020 07:01  -  07 May 2020 07:00  --------------------------------------------------------  IN: 660 mL / OUT: 1375 mL / NET: -715 mL    Physical Exam:  Appearance: No acute distress; well appearing  Eyes: pink conjunctiva  HENT: Normal oral mucosa  Cardiovascular: RRR, S1, S2, IV/VI systolic murmur w/ no rubs, or gallops; normal JVP  Respiratory: Clear to auscultation bilaterally  Gastrointestinal: soft, non-tender, non-distended with normal bowel sounds  Musculoskeletal: No clubbing; no joint deformity   Neurologic: Non-focal  Psychiatry: AAOx3, mood & affect appropriate  Skin: No rashes, ecchymoses, or cyanosis                   9.7    7.67  )-----------( 235      ( 07 May 2020 07:21 )             30.1     05-07    136  |  103  |  42<H>  ----------------------------<  116<H>  4.0   |  21<L>  |  1.41<H>    Ca    9.3      07 May 2020 07:21    TPro  7.6  /  Alb  3.8  /  TBili  3.9<H>  /  DBili  x   /  AST  18  /  ALT  12  /  AlkPhos  90  05-07                  tacrolimus   6 milliGRAM(s) Oral (05-07-20 @ 07:42)   6 milliGRAM(s) Oral (05-06-20 @ 20:01)    tacrolimus   5 milliGRAM(s) Oral (05-06-20 @ 08:24)   5 milliGRAM(s) Oral (05-05-20 @ 21:31)              Tacrolimus: 4.4 5/6  Tacrolimus: 5.5 5/5  Tacrolimus (), Serum: 6.1 ng/mL (05-04-20 @ 08:53)  Tacrolimus (), Serum: 6.0 ng/mL (05-03-20 @ 09:03)  Tacrolimus (), Serum: 7.1 ng/mL (05-02-20 @ 08:59)  Tacrolimus (), Serum: 10.5 ng/mL (05-01-20 @ 07:47)  Tacrolimus (), Serum: 9.3 ng/mL (04-30-20 @ 08:37)  Tacrolimus (), Serum: 6.8 ng/mL (04-29-20 @ 16:04) Subjective: no acute overnight events. Abdominal pain and chest pain all have improved.    Tele: ST 's, 140's briefly with exertion.             Current Meds:  acetaminophen   Tablet .. 650 milliGRAM(s) Oral every 6 hours PRN  aspirin enteric coated 81 milliGRAM(s) Oral daily  folic acid 1 milliGRAM(s) Oral daily  insulin lispro (HumaLOG) corrective regimen sliding scale   SubCutaneous three times a day before meals  insulin lispro (HumaLOG) corrective regimen sliding scale   SubCutaneous at bedtime  magnesium oxide 400 milliGRAM(s) Oral daily  mycophenolate mofetil 500 milliGRAM(s) Oral two times a day  nystatin    Suspension 369825 Unit(s) Oral <User Schedule>  pravastatin 20 milliGRAM(s) Oral at bedtime  predniSONE   Tablet 75 milliGRAM(s) Oral daily  sodium bicarbonate 650 milliGRAM(s) Oral two times a day  sodium chloride 0.9% lock flush 3 milliLiter(s) IV Push every 8 hours  sodium chloride 0.9%. 1000 milliLiter(s) IV Continuous <Continuous>  sodium zirconium cyclosilicate 10 Gram(s) Oral <User Schedule>  sodium zirconium cyclosilicate 10 Gram(s) Oral <User Schedule>  tacrolimus 6 milliGRAM(s) Oral <User Schedule>  trimethoprim  160 mG/sulfamethoxazole 800 mG 1 Tablet(s) Oral daily  valGANciclovir 450 milliGRAM(s) Oral daily      Vitals:  T(C): 36.8 (05-07-20 @ 04:10), Max: 36.9 (05-06-20 @ 20:45)  T(F): 98.2 (05-07-20 @ 04:10), Max: 98.4 (05-06-20 @ 20:45)  HR: 114 (05-07-20 @ 04:10) (114 - 120)  BP: 126/86 (05-07-20 @ 04:10) (118/81 - 137/87)  RR: 18 (05-07-20 @ 04:10) (17 - 18)  SpO2: 100% (05-07-20 @ 04:10) (97% - 100%)      I&O's Summary    06 May 2020 07:01  -  07 May 2020 07:00  --------------------------------------------------------  IN: 660 mL / OUT: 1375 mL / NET: -715 mL    Physical Exam:  Appearance: No acute distress; well appearing  Eyes: pink conjunctiva  HENT: Normal oral mucosa  Cardiovascular: RRR, S1, S2, IV/VI systolic murmur w/ no rubs, or gallops; normal JVP  Respiratory: Clear to auscultation bilaterally  Gastrointestinal: soft, non-tender, non-distended with normal bowel sounds  Musculoskeletal: No clubbing; no joint deformity   Neurologic: Non-focal  Psychiatry: AAOx3, mood & affect appropriate  Skin: No rashes, ecchymoses, or cyanosis                   9.7    7.67  )-----------( 235      ( 07 May 2020 07:21 )             30.1     05-07    136  |  103  |  42<H>  ----------------------------<  116<H>  4.0   |  21<L>  |  1.41<H>    Ca    9.3      07 May 2020 07:21    TPro  7.6  /  Alb  3.8  /  TBili  3.9<H>  /  DBili  x   /  AST  18  /  ALT  12  /  AlkPhos  90  05-07                  tacrolimus   6 milliGRAM(s) Oral (05-07-20 @ 07:42)   6 milliGRAM(s) Oral (05-06-20 @ 20:01)    tacrolimus   5 milliGRAM(s) Oral (05-06-20 @ 08:24)   5 milliGRAM(s) Oral (05-05-20 @ 21:31)            Tacrolimus 6.3 5/7  Tacrolimus: 4.4 5/6  Tacrolimus: 5.5 5/5  Tacrolimus (), Serum: 6.1 ng/mL (05-04-20 @ 08:53)  Tacrolimus (), Serum: 6.0 ng/mL (05-03-20 @ 09:03)  Tacrolimus (), Serum: 7.1 ng/mL (05-02-20 @ 08:59)  Tacrolimus (), Serum: 10.5 ng/mL (05-01-20 @ 07:47)  Tacrolimus (), Serum: 9.3 ng/mL (04-30-20 @ 08:37)  Tacrolimus (), Serum: 6.8 ng/mL (04-29-20 @ 16:04)

## 2020-05-07 NOTE — PROGRESS NOTE ADULT - ASSESSMENT
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 Brought in for concerns of COVID infection which returned negative. 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  Currently being treated with high dose of prednisone and waiting for various viral PCR's to determine the etiology. There had been case reports of hemolytic anemia secondary to calcineurin inhibitors and various viruses including CMV, Parvovirus B 19, HHV, EBV etc. For now we'll check viral PCR's. I've discussed his case with hematology. Suspicion for post-transplant lymphoproliferative disorder is low given no prominent lymph notes on CT scan.    TBili and LDH are increased today. s/p 1 U PRBC yesterday with appropriate response. Plan to discharge today.         INCOMPLETE NOTE      Kely Jha D.O.  Adv. 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 Brought in for concerns of COVID infection which returned negative. 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  Currently being treated with high dose of prednisone and waiting for various viral PCR's to determine the etiology. There had been case reports of hemolytic anemia secondary to calcineurin inhibitors and various viruses including CMV, Parvovirus B 19, HHV, EBV etc. For now we'll check viral PCR's. I've discussed his case with hematology. Suspicion for post-transplant lymphoproliferative disorder is low given no prominent lymph notes on CT scan.    TBili and LDH are increased today. s/p 1 U PRBC yesterday with appropriate response. Plan to discharge today. Check CBC/CMP/LDH/Hapto on MOnday. Follow up with Heme on Tues as scheduled.        INCOMPLETE NOTE      Kely Jha D.O.  Adv. 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 Brought in for concerns of COVID infection which returned negative. 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  Currently being treated with high dose of prednisone and waiting for various viral PCR's to determine the etiology. There had been case reports of hemolytic anemia secondary to calcineurin inhibitors and various viruses including CMV, Parvovirus B 19, HHV, EBV etc. For now we'll check viral PCR's. I've discussed his case with hematology. Suspicion for post-transplant lymphoproliferative disorder is low given no prominent lymph notes on CT scan.    Feeling well. Hemodynamically stable and euvolemic.  TBili and LDH are increased today. s/p 2 U PRBC  yesterday with appropriate response. Plan to discharge today. Check CBC/CMP/LDH/Hapto on Tues. Follow up with Heme on Tues as scheduled.    Kely Jha D.O.  Adv. HF and transplant fellow  [phone]

## 2020-05-08 ENCOUNTER — OUTPATIENT (OUTPATIENT)
Dept: OUTPATIENT SERVICES | Facility: HOSPITAL | Age: 70
LOS: 1 days | Discharge: HOME | End: 2020-05-08

## 2020-05-08 DIAGNOSIS — Z94.1 HEART TRANSPLANT STATUS: Chronic | ICD-10-CM

## 2020-05-08 DIAGNOSIS — Z98.890 OTHER SPECIFIED POSTPROCEDURAL STATES: Chronic | ICD-10-CM

## 2020-05-08 DIAGNOSIS — D64.9 ANEMIA, UNSPECIFIED: ICD-10-CM

## 2020-05-12 ENCOUNTER — LABORATORY RESULT (OUTPATIENT)
Age: 70
End: 2020-05-12

## 2020-05-12 ENCOUNTER — APPOINTMENT (OUTPATIENT)
Dept: HEMATOLOGY ONCOLOGY | Facility: CLINIC | Age: 70
End: 2020-05-12
Payer: MEDICARE

## 2020-05-12 ENCOUNTER — RESULT REVIEW (OUTPATIENT)
Age: 70
End: 2020-05-12

## 2020-05-12 VITALS
TEMPERATURE: 98 F | SYSTOLIC BLOOD PRESSURE: 149 MMHG | OXYGEN SATURATION: 99 % | DIASTOLIC BLOOD PRESSURE: 96 MMHG | HEART RATE: 121 BPM | BODY MASS INDEX: 26.2 KG/M2 | HEIGHT: 67.91 IN | RESPIRATION RATE: 16 BRPM | WEIGHT: 170.86 LBS

## 2020-05-12 DIAGNOSIS — Z87.898 PERSONAL HISTORY OF OTHER SPECIFIED CONDITIONS: ICD-10-CM

## 2020-05-12 DIAGNOSIS — I82.623 ACUTE EMBOLISM AND THROMBOSIS OF DEEP VEINS OF UPPER EXTREMITY, BILATERAL: ICD-10-CM

## 2020-05-12 LAB
ALBUMIN SERPL ELPH-MCNC: 3.9 G/DL
ALP BLD-CCNC: 81 U/L
ALT SERPL-CCNC: 23 U/L
ANION GAP SERPL CALC-SCNC: 13 MMOL/L
AST SERPL-CCNC: 16 U/L
BASOPHILS # BLD AUTO: 0.01 K/UL — SIGNIFICANT CHANGE UP (ref 0–0.2)
BASOPHILS NFR BLD AUTO: 0.1 % — SIGNIFICANT CHANGE UP (ref 0–2)
BILIRUB DIRECT SERPL-MCNC: 0.4 MG/DL
BILIRUB INDIRECT SERPL-MCNC: 0.9 MG/DL
BILIRUB SERPL-MCNC: 1.3 MG/DL
BUN SERPL-MCNC: 35 MG/DL
CALCIUM SERPL-MCNC: 8.8 MG/DL
CHLORIDE SERPL-SCNC: 103 MMOL/L
CO2 SERPL-SCNC: 25 MMOL/L
CREAT SERPL-MCNC: 1.16 MG/DL
EOSINOPHIL # BLD AUTO: 0.01 K/UL — SIGNIFICANT CHANGE UP (ref 0–0.5)
EOSINOPHIL NFR BLD AUTO: 0.1 % — SIGNIFICANT CHANGE UP (ref 0–6)
GLUCOSE SERPL-MCNC: 91 MG/DL
HAPTOGLOB SERPL-MCNC: 24 MG/DL
HCT VFR BLD CALC: 25.2 % — LOW (ref 39–50)
HGB BLD-MCNC: 8.3 G/DL — LOW (ref 13–17)
IMM GRANULOCYTES NFR BLD AUTO: 1.5 % — SIGNIFICANT CHANGE UP (ref 0–1.5)
LDH SERPL-CCNC: 314 U/L
LYMPHOCYTES # BLD AUTO: 19 % — SIGNIFICANT CHANGE UP (ref 13–44)
LYMPHOCYTES # BLD AUTO: 2.1 K/UL — SIGNIFICANT CHANGE UP (ref 1–3.3)
MCHC RBC-ENTMCNC: 30.1 PG — SIGNIFICANT CHANGE UP (ref 27–34)
MCHC RBC-ENTMCNC: 32.9 GM/DL — SIGNIFICANT CHANGE UP (ref 32–36)
MCV RBC AUTO: 91.3 FL — SIGNIFICANT CHANGE UP (ref 80–100)
MONOCYTES # BLD AUTO: 0.57 K/UL — SIGNIFICANT CHANGE UP (ref 0–0.9)
MONOCYTES NFR BLD AUTO: 5.1 % — SIGNIFICANT CHANGE UP (ref 2–14)
NEUTROPHILS # BLD AUTO: 8.21 K/UL — HIGH (ref 1.8–7.4)
NEUTROPHILS NFR BLD AUTO: 74.2 % — SIGNIFICANT CHANGE UP (ref 43–77)
NRBC # BLD: 0 /100 WBCS — SIGNIFICANT CHANGE UP (ref 0–0)
PLATELET # BLD AUTO: 221 K/UL — SIGNIFICANT CHANGE UP (ref 150–400)
POTASSIUM SERPL-SCNC: 3.5 MMOL/L
PROT SERPL-MCNC: 6.9 G/DL
RBC # BLD: 2.76 M/UL — LOW (ref 4.2–5.8)
RBC # FLD: 16.5 % — HIGH (ref 10.3–14.5)
RETICS #: 7.1 K/UL — LOW (ref 25–125)
RETICS/RBC NFR: 0.3 % — LOW (ref 0.5–2.5)
SODIUM SERPL-SCNC: 141 MMOL/L
TACROLIMUS SERPL-MCNC: 3.7 NG/ML
WBC # BLD: 11.07 K/UL — HIGH (ref 3.8–10.5)
WBC # FLD AUTO: 11.07 K/UL — HIGH (ref 3.8–10.5)

## 2020-05-12 PROCEDURE — 99215 OFFICE O/P EST HI 40 MIN: CPT

## 2020-05-12 RX ORDER — PREDNISONE 5 MG/1
5 TABLET ORAL
Qty: 30 | Refills: 5 | Status: DISCONTINUED | COMMUNITY
Start: 2020-05-05 | End: 2020-05-12

## 2020-05-12 RX ORDER — TACROLIMUS 5 MG/1
5 CAPSULE ORAL
Qty: 60 | Refills: 5 | Status: DISCONTINUED | COMMUNITY
Start: 2020-05-05 | End: 2020-05-12

## 2020-05-12 RX ORDER — NYSTATIN 100000 [USP'U]/ML
100000 SUSPENSION ORAL 4 TIMES DAILY
Qty: 600 | Refills: 5 | Status: DISCONTINUED | COMMUNITY
Start: 2020-05-05 | End: 2020-05-12

## 2020-05-12 NOTE — H&P CARDIOLOGY - REASON
Patient declined virtual visit options understanding at this time we will not be able to schedule them until after May 26 which is also subject to change.
RHC/Biopsy

## 2020-05-12 NOTE — PHYSICAL EXAM
[Restricted in physically strenuous activity but ambulatory and able to carry out work of a light or sedentary nature] : Status 1- Restricted in physically strenuous activity but ambulatory and able to carry out work of a light or sedentary nature, e.g., light house work, office work [Normal] : affect appropriate [de-identified] : regular rate tachycardia, BRENT [de-identified] : no splenomegaly [de-identified] : chronic vascular changes -hyperpigmented, shiny shins [de-identified] : uses cane for balance

## 2020-05-12 NOTE — REVIEW OF SYSTEMS
[Joint Pain] : joint pain [Negative] : Allergic/Immunologic [Fever] : no fever [Night Sweats] : no night sweats [Fatigue] : no fatigue [Recent Change In Weight] : ~T no recent weight change [Eye Pain] : no eye pain [Dry Eyes] : no dryness of the eyes [Red Eyes] : eyes not red [Vision Problems] : no vision problems [Dysphagia] : no dysphagia [Loss of Hearing] : no loss of hearing [Nosebleeds] : no nosebleeds [Hoarseness] : no hoarseness [Odynophagia] : no odynophagia [Mucosal Pain] : no mucosal pain [Chest Pain] : no chest pain [Palpitations] : no palpitations [Lower Ext Edema] : no lower extremity edema [Leg Claudication] : no intermittent leg claudication [Shortness Of Breath] : no shortness of breath [Wheezing] : no wheezing [Cough] : no cough [SOB on Exertion] : no shortness of breath during exertion [Abdominal Pain] : no abdominal pain [Vomiting] : no vomiting [Constipation] : no constipation [Diarrhea] : no diarrhea [Dysuria] : no dysuria [Incontinence] : no incontinence [Joint Stiffness] : no joint stiffness [Muscle Pain] : no muscle pain [Skin Rash] : no skin rash [Skin Wound] : no skin wound [Dizziness] : no dizziness [Confused] : no confusion [Fainting] : no fainting [Insomnia] : no insomnia [Suicidal] : not suicidal [Difficulty Walking] : no difficulty walking [Anxiety] : no anxiety [Proptosis] : no proptosis [Hot Flashes] : no hot flashes [Depression] : no depression [Easy Bleeding] : no tendency for easy bleeding [Muscle Weakness] : no muscle weakness [Swollen Glands] : no swollen glands [Easy Bruising] : no tendency for easy bruising [FreeTextEntry7] : no BRPBR. Last colonoscopy -2019 [FreeTextEntry9] : R knee pain, shoulders

## 2020-05-12 NOTE — HISTORY OF PRESENT ILLNESS
[de-identified] : Mr. Baez was referred to my office after recent admission to [facility] hospital during which he was diagnosed with autoimmune hemolytic anemia. He has a history of non-ischemic cardiomyopathy s/p cardiac transplant on 2/23/18 with coronary fistula, Hep C+ (treated), hx of prior b/l subclavian DVT s/p Rx, had acute antibody mediated rejection of transplant in 2018 and was treated successfully withp IVIG/plasmapharesis/Rituximab, and CKD (b/l Cr 1.4). He presented to hospital on 4/29/20 with pain across from chest for past day which was increasingly severe along with abdominal tightness.. Due to concern of coronavirus infection, he was instructed by his transplant coordinator to come to ER which he did via EMS. On arrival, VSS, afebrile. COVID19 PCR negative on 4/29/20 and 4/30/20. Admission Hb 8.1g/dl, repeat 7.1g/dl on 4/30/20, with elevated total bilirubin of 2.6. A Ct A/P was done on 4/30/20 to eval for cholelithiasis -no acute cholecystitis. A Direct Jacky test done on 5/1/20 showed a strong warm antibody and a cold antibody present. He was started on Prednisone 1mg/kg=75mg/day on 5/1/20. He was also given 1U PRBC on 5/2/20 and 1U PRBC on 5/6/20. He was also treated with Lokelma for hyperkalemia. GI recommending UGIS and outpt EGD for further evaluation of questionable gastric mural thickening. He was discharged home on 5/7/20 -on discharge, Hb 9.7g/dl.   [de-identified] : The patient is here for [facility] follow up. He feels well, has no chest pains/SOB/LOBATO. No fevers. NO sick contacts -lives with his 56yo brother; has an aid who comes daily for 6 hours. \par \par PATIENT"s TRANSPLANT COORDINATOR: Amairani ph [phone]

## 2020-05-12 NOTE — ASSESSMENT
[FreeTextEntry1] : 69 yo M with type 2 DM, CM s/p cardiac transplant in 2018 (s/p IVIg/plasmaphersis and Rituxan in 2018 for antibody-mediated rejection), CRI (creat baseline 1.5), referred for AIHA management\par \par -pt started on high dose Prednisone (1mg/kg=75mg po daily) on 5/1/20. Currently taking 70mg Prednisone once daily since 5/11/20. Given today's Hb is lower than admission Hb, will stay on same dosing of Prednisone. Discussed with patient that he may need repeat Rituxan (weekly x4) if steroids cannot be tapered off. Will check hemolysis labs today -LDH, retic count, CMP with direct/indirect bilirubin, haptoglobin. Will add on a Tacrolimus level for CARD (monitoring pt's levels)\par \par -cont Bactrim for PCP prophylaxis\par \par -pt should follow up with GI for upper EGD as per hospital discharge\par \par -will arrange home blood draws q2 wks on Mondays, starting 5/18/20 and monitor Hb/Hct in order to slowly taper off steroids. Will check hemolysis labs at the same time, and will discuss instructions with patient based on results\par \par -follow up in 6 wks

## 2020-05-12 NOTE — RESULTS/DATA
[FreeTextEntry1] : Today's CBC (On 5/12/20) wbc 11.7 Hb 8.3 plt 221\par \par The peripheral smear was reviewed. It showed neutrophils with toxic granulations, some dysplastic monocytes. Red cells -rouleaux present (2-3 cells), no schistocytes, few microspherocytes. Few large plt noted\par \par LABS\par ON 5/7/20 wbc 7.7 Hb 9.7 plt 235  total bili 3.9\par On 5/2/20 wbc 5.3 Hb 6.8 plt 224\par On 4/29/20 wbc 3.6 Hb 8.1 plt 241\par On 2/12/20 wbc 4.2 Hb10.4 plt 227\par \par RADIOLOGY\par on 4/30/20 CT A/P \par LOWER CHEST: Prior sternotomy. Reticular scarring/atelectasis bilaterally, right more than left.\par \par LIVER: Within normal limits.\par BILE DUCTS: Normal caliber.\par GALLBLADDER: Cholelithiasis.\par SPLEEN: Within normal limits.\par PANCREAS: Within normal limits.\par ADRENALS: Stable 1.5 cm sized left adrenal nodule. This is not definitely characterized.\par KIDNEYS/URETERS: Approximately 4 cm sized cyst involving the lower left kidney. Slightly smaller right kidney.\par \par BLADDER: Limited evaluation due to artifacts from left hip prosthesis.\par REPRODUCTIVE ORGANS: Limited evaluation due to artifact from left hip prosthesis.\par \par BOWEL: No bowel obstruction. Appendix is normal. The appearance of gastric antrum may be related to peristalsis or mural thickening. If clinically indicated, endoscopy may be considered for further evaluation.\par PERITONEUM: No ascites.\par VESSELS: Within normal limits.\par RETROPERITONEUM/LYMPH NODES: No lymphadenopathy.  \par ABDOMINAL WALL: Asymmetric atrophy of the right lateral inguinal musculature.\par BONES: Left hip prosthesis. Degenerative disc disease.\par \par IMPRESSION: \par \par Cholelithiasis without any findings to suggest acute cholecystitis or any biliary obstruction.\par \par Peristalsis versus mural thickening gastric antrum.\par

## 2020-05-14 LAB
CMV DNA SPEC QL NAA+PROBE: ABNORMAL IU/ML
CMVPCR LOG: ABNORMAL LOG10IU/ML

## 2020-05-14 NOTE — PROGRESS NOTE ADULT - SUBJECTIVE AND OBJECTIVE BOX
LVAD Interrogation: 1 PI event but no suction events  Pump Flow:4.4  Pump Speed: 9200  Pulse Index: 6.0  Pump Power: 5.2  VAD Events: no events  Driveline evaluation:  Driveline dressing changed old bloody drainage noted on gauze.  Programming Changes: [x ] No changes made [ ] Changes made:      Met twice with patient once this am where he is having difficulty with getting the power lead on the battery clip. we went from power module to batteries and back. Patient has difficulty with the changes of power source. Reviewed battery life, what happens if you disconnect both power leads at the same time.    Driveline: Dressing was changed with friend Tahira Quintero watching  Equipment: Reviewed battery life, emergency equipment  patient needs at all time    2nd visit:  Reviewed all education with the patient and his friend Tahira.  It was discussed that we need to include other family members, patient stated his brother Sony    Will meet with Tahira again tomorrow, she is going on vacation on Friday Cyclosporine Counseling:  I discussed with the patient the risks of cyclosporine including but not limited to hypertension, gingival hyperplasia,myelosuppression, immunosuppression, liver damage, kidney damage, neurotoxicity, lymphoma, and serious infections. The patient understands that monitoring is required including baseline blood pressure, CBC, CMP, lipid panel and uric acid, and then 1-2 times monthly CMP and blood pressure.

## 2020-05-18 ENCOUNTER — LABORATORY RESULT (OUTPATIENT)
Age: 70
End: 2020-05-18

## 2020-05-19 LAB
HAPTOGLOB SERPL-MCNC: 80 MG/DL
LDH SERPL-CCNC: 281 U/L

## 2020-05-19 RX ORDER — SODIUM ZIRCONIUM CYCLOSILICATE 10 G/10G
10 POWDER, FOR SUSPENSION ORAL
Qty: 60 | Refills: 5 | Status: DISCONTINUED | COMMUNITY
Start: 2020-05-05 | End: 2020-05-19

## 2020-05-20 LAB
ALBUMIN SERPL ELPH-MCNC: 3.7 G/DL
ALP BLD-CCNC: 72 U/L
ALT SERPL-CCNC: 22 U/L
ANION GAP SERPL CALC-SCNC: 15 MMOL/L
AST SERPL-CCNC: 16 U/L
BASOPHILS # BLD AUTO: 0.01 K/UL
BASOPHILS NFR BLD AUTO: 0.1 %
BILIRUB DIRECT SERPL-MCNC: 0.3 MG/DL
BILIRUB INDIRECT SERPL-MCNC: 0.6 MG/DL
BILIRUB SERPL-MCNC: 0.9 MG/DL
BUN SERPL-MCNC: 32 MG/DL
CALCIUM SERPL-MCNC: 8.1 MG/DL
CHLORIDE SERPL-SCNC: 104 MMOL/L
CO2 SERPL-SCNC: 25 MMOL/L
CREAT SERPL-MCNC: 1.2 MG/DL
EOSINOPHIL # BLD AUTO: 0.01 K/UL
EOSINOPHIL NFR BLD AUTO: 0.1 %
GLUCOSE SERPL-MCNC: 110 MG/DL
HCT VFR BLD CALC: 24.5 %
HGB BLD-MCNC: 7.4 G/DL
IMM GRANULOCYTES NFR BLD AUTO: 0.7 %
LYMPHOCYTES # BLD AUTO: 2.09 K/UL
LYMPHOCYTES NFR BLD AUTO: 14.1 %
MAN DIFF?: NORMAL
MCHC RBC-ENTMCNC: 29.7 PG
MCHC RBC-ENTMCNC: 30.2 GM/DL
MCV RBC AUTO: 98.4 FL
MONOCYTES # BLD AUTO: 0.24 K/UL
MONOCYTES NFR BLD AUTO: 1.6 %
NEUTROPHILS # BLD AUTO: 12.33 K/UL
NEUTROPHILS NFR BLD AUTO: 83.4 %
PLATELET # BLD AUTO: 192 K/UL
POTASSIUM SERPL-SCNC: 3.2 MMOL/L
PROT SERPL-MCNC: 6.3 G/DL
RBC # BLD: 2.49 M/UL
RBC # BLD: 2.49 M/UL
RBC # FLD: 16.8 %
RETICS # AUTO: 0.1 %
RETICS AGGREG/RBC NFR: 1.2 K/UL
SODIUM SERPL-SCNC: 144 MMOL/L
WBC # FLD AUTO: 14.78 K/UL

## 2020-05-22 ENCOUNTER — RESULT REVIEW (OUTPATIENT)
Age: 70
End: 2020-05-22

## 2020-05-22 ENCOUNTER — APPOINTMENT (OUTPATIENT)
Dept: INFUSION THERAPY | Facility: HOSPITAL | Age: 70
End: 2020-05-22

## 2020-05-22 ENCOUNTER — APPOINTMENT (OUTPATIENT)
Dept: HEMATOLOGY ONCOLOGY | Facility: CLINIC | Age: 70
End: 2020-05-22
Payer: MEDICARE

## 2020-05-22 ENCOUNTER — LABORATORY RESULT (OUTPATIENT)
Age: 70
End: 2020-05-22

## 2020-05-22 ENCOUNTER — INPATIENT (INPATIENT)
Facility: HOSPITAL | Age: 70
LOS: 10 days | Discharge: HOME | DRG: 808 | End: 2020-06-02
Attending: THORACIC SURGERY (CARDIOTHORACIC VASCULAR SURGERY) | Admitting: THORACIC SURGERY (CARDIOTHORACIC VASCULAR SURGERY)
Payer: MEDICARE

## 2020-05-22 VITALS
HEIGHT: 68 IN | HEART RATE: 120 BPM | OXYGEN SATURATION: 100 % | RESPIRATION RATE: 16 BRPM | SYSTOLIC BLOOD PRESSURE: 120 MMHG | DIASTOLIC BLOOD PRESSURE: 82 MMHG | WEIGHT: 169.98 LBS | TEMPERATURE: 99 F

## 2020-05-22 DIAGNOSIS — D59.1 OTHER AUTOIMMUNE HEMOLYTIC ANEMIAS: ICD-10-CM

## 2020-05-22 DIAGNOSIS — Z94.1 HEART TRANSPLANT STATUS: Chronic | ICD-10-CM

## 2020-05-22 DIAGNOSIS — Z94.1 HEART TRANSPLANT STATUS: ICD-10-CM

## 2020-05-22 DIAGNOSIS — Z98.890 OTHER SPECIFIED POSTPROCEDURAL STATES: Chronic | ICD-10-CM

## 2020-05-22 DIAGNOSIS — N18.2 CHRONIC KIDNEY DISEASE, STAGE 2 (MILD): ICD-10-CM

## 2020-05-22 DIAGNOSIS — N18.3 CHRONIC KIDNEY DISEASE, STAGE 3 (MODERATE): ICD-10-CM

## 2020-05-22 LAB
ALBUMIN SERPL ELPH-MCNC: 3.2 G/DL — LOW (ref 3.3–5)
ALP SERPL-CCNC: 56 U/L — SIGNIFICANT CHANGE UP (ref 40–120)
ALT FLD-CCNC: 14 U/L — SIGNIFICANT CHANGE UP (ref 10–45)
ANION GAP SERPL CALC-SCNC: 10 MMOL/L — SIGNIFICANT CHANGE UP (ref 5–17)
ANTIBODY ID 1_1: SIGNIFICANT CHANGE UP
ANTIBODY ID 1_2: SIGNIFICANT CHANGE UP
APPEARANCE UR: CLEAR — SIGNIFICANT CHANGE UP
APTT BLD: 28.3 SEC — SIGNIFICANT CHANGE UP (ref 27.5–36.3)
AST SERPL-CCNC: 15 U/L — SIGNIFICANT CHANGE UP (ref 10–40)
BACTERIA # UR AUTO: NEGATIVE — SIGNIFICANT CHANGE UP
BASOPHILS # BLD AUTO: 0.01 K/UL — SIGNIFICANT CHANGE UP (ref 0–0.2)
BASOPHILS NFR BLD AUTO: 0.1 % — SIGNIFICANT CHANGE UP (ref 0–2)
BILIRUB SERPL-MCNC: 1.2 MG/DL — SIGNIFICANT CHANGE UP (ref 0.2–1.2)
BILIRUB UR-MCNC: NEGATIVE — SIGNIFICANT CHANGE UP
BLD GP AB SCN SERPL QL: POSITIVE — SIGNIFICANT CHANGE UP
BUN SERPL-MCNC: 25 MG/DL — HIGH (ref 7–23)
CALCIUM SERPL-MCNC: 8.2 MG/DL — LOW (ref 8.4–10.5)
CHLORIDE SERPL-SCNC: 102 MMOL/L — SIGNIFICANT CHANGE UP (ref 96–108)
CO2 SERPL-SCNC: 24 MMOL/L — SIGNIFICANT CHANGE UP (ref 22–31)
COLOR SPEC: YELLOW — SIGNIFICANT CHANGE UP
CREAT SERPL-MCNC: 1.27 MG/DL — SIGNIFICANT CHANGE UP (ref 0.5–1.3)
DAT C3-SP REAG RBC QL: NEGATIVE — SIGNIFICANT CHANGE UP
DAT POLY-SP REAG RBC QL: POSITIVE — SIGNIFICANT CHANGE UP
DIFF PNL FLD: NEGATIVE — SIGNIFICANT CHANGE UP
DIRECT COOMBS IGG: POSITIVE — SIGNIFICANT CHANGE UP
ELUATE ANTIBODY 1: SIGNIFICANT CHANGE UP
EOSINOPHIL # BLD AUTO: 0.01 K/UL — SIGNIFICANT CHANGE UP (ref 0–0.5)
EOSINOPHIL NFR BLD AUTO: 0.1 % — SIGNIFICANT CHANGE UP (ref 0–6)
EPI CELLS # UR: 2 /HPF — SIGNIFICANT CHANGE UP
GLUCOSE SERPL-MCNC: 101 MG/DL — HIGH (ref 70–99)
GLUCOSE UR QL: NEGATIVE — SIGNIFICANT CHANGE UP
HCT VFR BLD CALC: 19.5 % — CRITICAL LOW (ref 39–50)
HCT VFR BLD CALC: 19.6 % — CRITICAL LOW (ref 39–50)
HGB BLD-MCNC: 6.1 G/DL — CRITICAL LOW (ref 13–17)
HGB BLD-MCNC: 6.3 G/DL — CRITICAL LOW (ref 13–17)
HYALINE CASTS # UR AUTO: 1 /LPF — SIGNIFICANT CHANGE UP (ref 0–2)
IMM GRANULOCYTES NFR BLD AUTO: 1.9 % — HIGH (ref 0–1.5)
KETONES UR-MCNC: NEGATIVE — SIGNIFICANT CHANGE UP
LEUKOCYTE ESTERASE UR-ACNC: NEGATIVE — SIGNIFICANT CHANGE UP
LYMPHOCYTES # BLD AUTO: 1.45 K/UL — SIGNIFICANT CHANGE UP (ref 1–3.3)
LYMPHOCYTES # BLD AUTO: 21.5 % — SIGNIFICANT CHANGE UP (ref 13–44)
MCHC RBC-ENTMCNC: 28.9 PG — SIGNIFICANT CHANGE UP (ref 27–34)
MCHC RBC-ENTMCNC: 29.6 PG — SIGNIFICANT CHANGE UP (ref 27–34)
MCHC RBC-ENTMCNC: 31.3 GM/DL — LOW (ref 32–36)
MCHC RBC-ENTMCNC: 32.1 GM/DL — SIGNIFICANT CHANGE UP (ref 32–36)
MCV RBC AUTO: 92 FL — SIGNIFICANT CHANGE UP (ref 80–100)
MCV RBC AUTO: 92.4 FL — SIGNIFICANT CHANGE UP (ref 80–100)
MONOCYTES # BLD AUTO: 0.21 K/UL — SIGNIFICANT CHANGE UP (ref 0–0.9)
MONOCYTES NFR BLD AUTO: 3.1 % — SIGNIFICANT CHANGE UP (ref 2–14)
NEUTROPHILS # BLD AUTO: 4.93 K/UL — SIGNIFICANT CHANGE UP (ref 1.8–7.4)
NEUTROPHILS NFR BLD AUTO: 73.3 % — SIGNIFICANT CHANGE UP (ref 43–77)
NITRITE UR-MCNC: NEGATIVE — SIGNIFICANT CHANGE UP
NRBC # BLD: 0 /100 WBCS — SIGNIFICANT CHANGE UP (ref 0–0)
NRBC # BLD: 0 /100 WBCS — SIGNIFICANT CHANGE UP (ref 0–0)
OB PNL STL: NEGATIVE — SIGNIFICANT CHANGE UP
PH UR: 6.5 — SIGNIFICANT CHANGE UP (ref 5–8)
PLATELET # BLD AUTO: 110 K/UL — LOW (ref 150–400)
PLATELET # BLD AUTO: 110 K/UL — LOW (ref 150–400)
POTASSIUM SERPL-MCNC: 3.9 MMOL/L — SIGNIFICANT CHANGE UP (ref 3.5–5.3)
POTASSIUM SERPL-SCNC: 3.9 MMOL/L — SIGNIFICANT CHANGE UP (ref 3.5–5.3)
PROT SERPL-MCNC: 6.3 G/DL — SIGNIFICANT CHANGE UP (ref 6–8.3)
PROT UR-MCNC: ABNORMAL
RBC # BLD: 2.11 M/UL — LOW (ref 4.2–5.8)
RBC # BLD: 2.13 M/UL — LOW (ref 4.2–5.8)
RBC # FLD: 16 % — HIGH (ref 10.3–14.5)
RBC # FLD: 16.1 % — HIGH (ref 10.3–14.5)
RBC CASTS # UR COMP ASSIST: 1 /HPF — SIGNIFICANT CHANGE UP (ref 0–4)
RH IG SCN BLD-IMP: POSITIVE — SIGNIFICANT CHANGE UP
SARS-COV-2 RNA SPEC QL NAA+PROBE: SIGNIFICANT CHANGE UP
SODIUM SERPL-SCNC: 136 MMOL/L — SIGNIFICANT CHANGE UP (ref 135–145)
SP GR SPEC: 1.02 — SIGNIFICANT CHANGE UP (ref 1.01–1.02)
TROPONIN T, HIGH SENSITIVITY RESULT: 34 NG/L — SIGNIFICANT CHANGE UP (ref 0–51)
UROBILINOGEN FLD QL: NEGATIVE — SIGNIFICANT CHANGE UP
WBC # BLD: 5.9 K/UL — SIGNIFICANT CHANGE UP (ref 3.8–10.5)
WBC # BLD: 6.74 K/UL — SIGNIFICANT CHANGE UP (ref 3.8–10.5)
WBC # FLD AUTO: 5.9 K/UL — SIGNIFICANT CHANGE UP (ref 3.8–10.5)
WBC # FLD AUTO: 6.74 K/UL — SIGNIFICANT CHANGE UP (ref 3.8–10.5)
WBC UR QL: 2 /HPF — SIGNIFICANT CHANGE UP (ref 0–5)

## 2020-05-22 PROCEDURE — 99222 1ST HOSP IP/OBS MODERATE 55: CPT

## 2020-05-22 PROCEDURE — 86077 PHYS BLOOD BANK SERV XMATCH: CPT

## 2020-05-22 PROCEDURE — 99291 CRITICAL CARE FIRST HOUR: CPT | Mod: CS

## 2020-05-22 PROCEDURE — 71045 X-RAY EXAM CHEST 1 VIEW: CPT | Mod: 26

## 2020-05-22 PROCEDURE — 71250 CT THORAX DX C-: CPT | Mod: 26

## 2020-05-22 PROCEDURE — ZZZZZ: CPT

## 2020-05-22 PROCEDURE — 99214 OFFICE O/P EST MOD 30 MIN: CPT | Mod: PD

## 2020-05-22 RX ORDER — VALGANCICLOVIR 450 MG/1
450 TABLET, FILM COATED ORAL
Refills: 0 | Status: DISCONTINUED | OUTPATIENT
Start: 2020-05-22 | End: 2020-05-26

## 2020-05-22 RX ORDER — TACROLIMUS 5 MG/1
8 CAPSULE ORAL
Refills: 0 | Status: DISCONTINUED | OUTPATIENT
Start: 2020-05-22 | End: 2020-05-29

## 2020-05-22 RX ORDER — AZTREONAM 2 G
1 VIAL (EA) INJECTION
Qty: 0 | Refills: 0 | DISCHARGE

## 2020-05-22 RX ORDER — SODIUM BICARBONATE 1 MEQ/ML
650 SYRINGE (ML) INTRAVENOUS
Refills: 0 | Status: DISCONTINUED | OUTPATIENT
Start: 2020-05-22 | End: 2020-06-02

## 2020-05-22 RX ORDER — MAGNESIUM OXIDE 400 MG ORAL TABLET 241.3 MG
400 TABLET ORAL
Refills: 0 | Status: DISCONTINUED | OUTPATIENT
Start: 2020-05-23 | End: 2020-06-02

## 2020-05-22 RX ORDER — FOLIC ACID 0.8 MG
1 TABLET ORAL
Refills: 0 | Status: DISCONTINUED | OUTPATIENT
Start: 2020-05-22 | End: 2020-06-02

## 2020-05-22 RX ORDER — MYCOPHENOLATE MOFETIL 250 MG/1
500 CAPSULE ORAL
Refills: 0 | Status: DISCONTINUED | OUTPATIENT
Start: 2020-05-22 | End: 2020-05-28

## 2020-05-22 RX ORDER — ACETAMINOPHEN 500 MG
650 TABLET ORAL ONCE
Refills: 0 | Status: COMPLETED | OUTPATIENT
Start: 2020-05-22 | End: 2020-05-22

## 2020-05-22 RX ADMIN — MYCOPHENOLATE MOFETIL 500 MILLIGRAM(S): 250 CAPSULE ORAL at 19:40

## 2020-05-22 RX ADMIN — Medication 650 MILLIGRAM(S): at 22:00

## 2020-05-22 RX ADMIN — Medication 1 MILLIGRAM(S): at 19:40

## 2020-05-22 RX ADMIN — Medication 70 MILLIGRAM(S): at 22:00

## 2020-05-22 RX ADMIN — VALGANCICLOVIR 450 MILLIGRAM(S): 450 TABLET, FILM COATED ORAL at 22:00

## 2020-05-22 RX ADMIN — TACROLIMUS 8 MILLIGRAM(S): 5 CAPSULE ORAL at 22:03

## 2020-05-22 RX ADMIN — Medication 650 MILLIGRAM(S): at 17:57

## 2020-05-22 NOTE — H&P ADULT - ASSESSMENT
Assessment:  70y Male presents with Autoimmune hemolytic anemia 70y Male presents with Autoimmune hemolytic anemia PMHx includes NICM s/p HM2 s/p OHT on 2/23/18 with coronary fistula, HCV+ s/p Rx, prior antibody mediated rejection s/p IVIG plasmapharesis/Rituximab, CKD (baseline Cr 1.4), with recent admission for hemolytic anemia from 4/29-5/7. Presented today to [facility] for IV Rituxan therapy. But unable to obtain intravenous access despite multiple attempts. Patient was also found to be severely anemic with a hemoglobin 6.1 and was sent in to [facility] ER for PRBC transfusion and to initiate Rituxan therapy. In ER Occult stool negative. COVID-19 PCR negative. Noted a low grade temp(100.4).   Hospital Course:   Admitted to 2 [facility] Telemetry floor. Awaiting Type and Screen. 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 Infectious work up has been negative. Refractory to steroids  Plan to start Rituxan therapy in AM. CHF following. 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 Presented today to [facility] for IV Rituxan therapy. But unable to obtain intravenous access despite multiple attempts. Patient was also found to be severely anemic with a hemoglobin 6.1 and was sent in to [facility] ER for PRBC transfusion and to initiate Rituxan therapy. In ER Occult stool negative. COVID-19 PCR negative. Noted a low grade temp(100.4).   Hospital Course:   Admitted to 2 [facility] Telemetry floor. Awaiting Type and Screen. 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 Infectious work up has been negative. Refractory to steroids  Plan to start Rituxan therapy in AM. CHF following. ID to be called in AM.

## 2020-05-22 NOTE — ED ADULT NURSE NOTE - OBJECTIVE STATEMENT
Patient is a 70 year old cardiac transplant 2018 due to non-ischemic cardiomyopathy. Pt came from [facility] for the Rituxan infusion where pt said the staff cannot get a line. Blood was drawn at the center and hemoglobin/hematocrit is low. No distress. Breathing easy and non labored. Pt's skin hot to touch but dry. No diaphoresis and no chills. Pt with 100.3 rectal temp. Pt uses cane to ambulate. Pt mildly anxious. Emotional support offered. No active bleeding. Pt with hemolytic anemia. Pt without any specific complaints at this time.

## 2020-05-22 NOTE — CONSULT NOTE ADULT - ATTENDING COMMENTS
69 year old man here with WAIHA, also with a cold reacting antibody with acute drop in hb and difficulty getting access at [facility] here for further management of his AIHA.  -so far work up for underlying cause of aiha not suggestion: Flow negative, no lad on scans, viral studies have been negative  -send rheumatologic workup as above, repeat folic acid / b12  -continue steroids at this time  -can initiate pcp ppx as well as folic acid   -interestingly, his mcv is not up - would follow hapto, ldh, retic and bilirubin  -will also initiate rituximab as planned
70yrs s/p OHT Feb 18. Post transplant issues:  AMR, IVIG, PF , Ritoxan.   LV-cornary fistula.   Nocardia.   CRI, baseline 1.4  Recently admitted 4.29-5.7: with diagnosis KALIA positive hemolytic anemia treated with high dose steriods with a plan for ritoxan as an outpatient.    d/c meds: pred 75, bactim DS/valcyte 450QD, cellcept 500 bid, prograf 6 (increased to 8 for very low levels), ASA, statin  seen in hematology clinic for first ritoxan dose. unable to obtain IV access. Hb   6.1 sent to ER.   In ER COVID -ve. Tmax 100.4  meds: ritoxan one dose, cellcept 500 bid, prograf 8, pred 70, bactrim, valcyte, off asa statin.   received 2 PRBCs.   Afebrile, 100, 115/-120/, 98RA  I/O:   CT chest no lymphad. no change in prior RUL scarring.   05-23    133<L>  |  99  |  26<H>  ----------------------------<  242<H>  4.1   |  24  |  1.14  Ca    8.4      23 May 2020 07:47  TPro  6.9  /  Alb  3.6  /  TBili  2.7<H>  /  DBili  0.5<H>  /  AST  18  /  ALT  18  /  AlkPhos  75  05-23                        8.7    4.74  )-----------( 118      ( 23 May 2020 07:47 )             26.6   Hb 8.7, 6.1,   , 349 ond/c  Hapto pending.   prograf 6.1, 3.4, 3.7 (6.3 on May 7)   EBV positive early May  70yrs old with hemoyltic anemia admitted for ritoxan therapy.   prograf levels have been low as outpatient without explanation. will follow in house on current dose.   will consider d/c cellcept while on ritoxan.   note EBV positive pre transplant and currently.  possible d/c tomorrow for follow up by hem.  Marvin Campbell,

## 2020-05-22 NOTE — H&P ADULT - PROBLEM SELECTOR PLAN 1
Hematology / oncology following  Transfuse with 2 units PRBC   check CBC with DIFF, CMP, fractionated bilirubin, LDH, haptoglobin, Jacky, MIGUELINA, RF, ANCA  - CT Chest w/o contrast negative for any lymphadenopathy  - continue prednisone 70mg daily  - continue folic acid 1mg daily  - would start Bactrim DS on Mon/Wed/Fri for PCP prophylaxis as patient on steroids   - would recommend ID consult - only viral PCR that was detectable from prior admission was CMV  - Plan to get rituximab inpatient, first time.  Plan for tomorrow 5/23. Hematology / oncology following  Transfuse with 2 units PRBC   check CBC with DIFF, CMP, fractionated bilirubin, LDH, haptoglobin, Jacky, MIGUELINA, RF, ANCA  CT Chest w/o contrast negative for any lymphadenopathy  Continue prednisone 70mg daily  Continue folic acid 1mg daily  Would start Bactrim DS on Mon/Wed/Fri for PCP prophylaxis as patient on steroids   Recommend ID consult - only viral PCR that was detectable from prior admission was CMV  Plan to get rituximab inpatient for tomorrow 5/23.

## 2020-05-22 NOTE — H&P ADULT - NSHPPHYSICALEXAM_GEN_ALL_CORE
PHYSICAL EXAM  Vital Signs Last 24 Hrs  T(C): 36.4 (22 May 2020 21:26), Max: 37.9 (22 May 2020 13:57)  T(F): 97.6 (22 May 2020 21:26), Max: 100.3 (22 May 2020 13:57)  HR: 128 (22 May 2020 21:26) (110 - 128)  BP: 118/83 (22 May 2020 21:26) (93/65 - 121/87)  BP(mean): 91 (22 May 2020 19:43) (91 - 98)  RR: 18 (22 May 2020 21:26) (16 - 22)  SpO2: 99% (22 May 2020 21:26) (99% - 100%)    General: Well nourished, well developed, no acute distress.                                                         Neuro: Normal exam oriented to person/place & time with no focal motor or sensory  deficits.                    Eyes: Normal exam of conjunctiva & lids, pupils equally reactive.   ENT: Normal exam of nasal/oral mucosa with absence of cyanosis.   Neck: Normal exam of jugular veins, trachea & thyroid.   Chest: Normal lung exam with good air movement absence of wheezes, rales, or rhonchi:                                                                          CV:  Auscultation: normal [X ] S3[ ] S4[ ] Irregular [ ] Rub[ ] Clicks[ ]  Murmurs none:[X ]systolic [ ]  diastolic [ ] holosystolic [ ]  Carotids: No Bruits[ ] Other____________ Abdominal Aorta: normal [X ] nonpalpable[X ]                                                                         GI: Normal exam of abdomen, liver & spleen with no noted masses or tenderness.  (+) BS X 4 Quadrants, Nontender / Non Distended.           Extremities: Normal no evidence of cyanosis or deformity Edema: none[X ]trace[ ]1+[ ]2+[ ]3+[ ]4+[ ]  Lower Extremity Pulses: Right[+2 ] Left[ +2]Varicosities[-]

## 2020-05-22 NOTE — ED PROVIDER NOTE - PROGRESS NOTE DETAILS
Hematology aware of pt will see in ED. Agreed to plan for transfusion of 2 units PRBCs. Pt consented. - Mak Barnes PA-C Spoke to Dr. Ti Parker (cardiac transplant) accepted pt for admission. Spoke to transplant coordinator Amairani (#[phone]) who will make on-call heart failure team aware of admission. Discussed plan with patient who understands and agrees. All questions answered. - Mak Barnes PA-C Hematology aware of pt will see in ED. Agreed to plan for transfusion of 2 units PRBCs even in setting of fever. Pt consented. - Mak Barnes PA-C

## 2020-05-22 NOTE — CONSULT NOTE ADULT - SUBJECTIVE AND OBJECTIVE BOX
HPI:  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, HCV+ s/p Rx, prior antibody mediated rejection s/p IVIG/plasmapharesis/Rituximab, CKD (baseline Cr 1.4), with recent admission for hemolytic anemia from 4/29-5/7.   He presents from [facility] where he was supposed to get Rituxan, but was unable to have a line placed there and was sent in from treatment given hemoglobin 6.1.     On last admission, he was Jacky IgG Ab+, also had elevated Tbili/LDH and low haptoglobin consistent with hemolysis.             PAST MEDICAL & SURGICAL HISTORY:  Knee pain, right  HLD (hyperlipidemia)  Former smoker  DVT of upper extremity (deep vein thrombosis)  Hepatitis C virus  GIB (gastrointestinal bleeding)  Ventricular fibrillation: s/p AICD  PAF (paroxysmal atrial fibrillation): on xarelto  Non-Ischemic Cardiomyopathy  SVT (Supraventricular Tachycardia)  HTN  CHF (Congestive Heart Failure)  S/P right heart catheterization: biopsy multiple  H/O heart transplant: 2/2018  Status post left hip replacement  History of Prior Ablation Treatment: for afib  AICD (Automatic Cardioverter/Defibrillator) Present: [employer] with 1 [employer] lead4/1/09- explanted and replaced with Medtronic 2 leads on 9/2/09      Allergies  No Known Allergies      MEDICATIONS  (STANDING):    MEDICATIONS  (PRN):      FAMILY HISTORY:  No pertinent family history in first degree relatives      SOCIAL HISTORY: No EtOH, no tobacco    REVIEW OF SYSTEMS:    CONSTITUTIONAL: No weakness, fevers or chills  EYES/ENT: No visual changes;  No vertigo or throat pain   NECK: No pain or stiffness  RESPIRATORY: No cough, wheezing, hemoptysis; No shortness of breath  CARDIOVASCULAR: No chest pain or palpitations  GASTROINTESTINAL: No abdominal or epigastric pain. No nausea, vomiting, or hematemesis; No diarrhea or constipation. No melena or hematochezia.  GENITOURINARY: No dysuria, frequency or hematuria  NEUROLOGICAL: No numbness or weakness  SKIN: No itching, burning, rashes, or lesions   All other review of systems is negative unless indicated above.    Height (cm): 172.72 (05-22 @ 13:07), 170 (05-22 @ 10:22)  Weight (kg): 77.1 (05-22 @ 13:07), 78 (05-22 @ 10:22)  BMI (kg/m2): 25.8 (05-22 @ 13:07), 27 (05-22 @ 10:22)  BSA (m2): 1.91 (05-22 @ 13:07), 1.89 (05-22 @ 10:22)    T(F): 100.3 (05-22-20 @ 13:57), Max: 100.3 (05-22-20 @ 13:57)  HR: 115 (05-22-20 @ 13:57)  BP: 102/68 (05-22-20 @ 13:57)  RR: 18 (05-22-20 @ 13:57)  SpO2: 100% (05-22-20 @ 13:57)  Wt(kg): --    GENERAL: NAD, well-developed  HEAD:  Atraumatic, Normocephalic  EYES: EOMI, PERRLA, conjunctiva and sclera clear  NECK: Supple, No JVD  CHEST/LUNG: Clear to auscultation bilaterally; No wheeze  HEART: Regular rate and rhythm; No murmurs, rubs, or gallops  ABDOMEN: Soft, Nontender, Nondistended; Bowel sounds present  EXTREMITIES:  2+ Peripheral Pulses, No clubbing, cyanosis, or edema  NEUROLOGY: non-focal  SKIN: No rashes or lesions                          6.1    5.90  )-----------( 110      ( 22 May 2020 13:45 )             19.5       05-22    136  |  102  |  25<H>  ----------------------------<  101<H>  3.9   |  24  |  1.27    Ca    8.2<L>      22 May 2020 13:45    TPro  6.3  /  Alb  3.2<L>  /  TBili  1.2  /  DBili  x   /  AST  15  /  ALT  14  /  AlkPhos  56  05-22          PTT - ( 22 May 2020 13:45 )  PTT:28.3 sec HPI:  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, HCV+ s/p Rx, prior antibody mediated rejection s/p IVIG/plasmapharesis/Rituximab, CKD (baseline Cr 1.4), with recent admission for hemolytic anemia from 4/29-5/7.   He presents from [facility] where he was supposed to get Rituxan, but was unable to have a line placed there and was sent in from treatment given hemoglobin 6.1.     On last admission, he was Jacky IgG Ab+, also had elevated Tbili/LDH and low haptoglobin consistent with hemolysis.  Discharged with hemoglobin 9.7.  Prednisone 75mg was started inpatient and continued at discharge.  He saw Dr. Isaacs on 5/12 at which time his hemoglobin was 8.3.  Since there was a decrease in hemoglobin, prednisone was kept at 70 mg daily.   CBC on 5/18 showed hemoglobin 7.8.  It was then discussed that he would need weekly rituxan x 4.         PAST MEDICAL & SURGICAL HISTORY:  Knee pain, right  HLD (hyperlipidemia)  Former smoker  DVT of upper extremity (deep vein thrombosis)  Hepatitis C virus  GIB (gastrointestinal bleeding)  Ventricular fibrillation: s/p AICD  PAF (paroxysmal atrial fibrillation): on xarelto  Non-Ischemic Cardiomyopathy  SVT (Supraventricular Tachycardia)  HTN  CHF (Congestive Heart Failure)  S/P right heart catheterization: biopsy multiple  H/O heart transplant: 2/2018  Status post left hip replacement  History of Prior Ablation Treatment: for afib  AICD (Automatic Cardioverter/Defibrillator) Present: [employer] with 1 [employer] lead4/1/09- explanted and replaced with Medtronic 2 leads on 9/2/09      Allergies  No Known Allergies      MEDICATIONS  (STANDING):    MEDICATIONS  (PRN):      FAMILY HISTORY:  No pertinent family history in first degree relatives      SOCIAL HISTORY: No EtOH, no tobacco      REVIEW OF SYSTEMS:  CONSTITUTIONAL: No weakness, fevers or chills  EYES/ENT: No visual changes;  No vertigo or throat pain   NECK: No pain or stiffness  RESPIRATORY: No cough, wheezing, hemoptysis; No shortness of breath  CARDIOVASCULAR: No chest pain or palpitations  GASTROINTESTINAL: No abdominal or epigastric pain. No nausea, vomiting, or hematemesis; No diarrhea or constipation. No melena or hematochezia.  GENITOURINARY: No dysuria, frequency or hematuria  NEUROLOGICAL: No numbness or weakness  SKIN: No itching, burning, rashes, or lesions   All other review of systems is negative unless indicated above.      Height (cm): 172.72 (05-22 @ 13:07), 170 (05-22 @ 10:22)  Weight (kg): 77.1 (05-22 @ 13:07), 78 (05-22 @ 10:22)  BMI (kg/m2): 25.8 (05-22 @ 13:07), 27 (05-22 @ 10:22)  BSA (m2): 1.91 (05-22 @ 13:07), 1.89 (05-22 @ 10:22)      T(F): 100.3 (05-22-20 @ 13:57), Max: 100.3 (05-22-20 @ 13:57)  HR: 115 (05-22-20 @ 13:57)  BP: 102/68 (05-22-20 @ 13:57)  RR: 18 (05-22-20 @ 13:57)  SpO2: 100% (05-22-20 @ 13:57)      GENERAL: NAD, well-developed  HEAD:  Atraumatic, Normocephalic  EYES: EOMI, PERRLA, conjunctiva and sclera clear  NECK: Supple, No JVD  CHEST/LUNG: Clear to auscultation bilaterally; No wheeze  HEART: Regular rate and rhythm; No murmurs, rubs, or gallops  ABDOMEN: Soft, Nontender, Nondistended; Bowel sounds present  EXTREMITIES:  2+ Peripheral Pulses, No clubbing, cyanosis, or edema  NEUROLOGY: non-focal  SKIN: No rashes or lesions                          6.1    5.90  )-----------( 110      ( 22 May 2020 13:45 )             19.5       05-22    136  |  102  |  25<H>  ----------------------------<  101<H>  3.9   |  24  |  1.27    Ca    8.2<L>      22 May 2020 13:45    TPro  6.3  /  Alb  3.2<L>  /  TBili  1.2  /  DBili  x   /  AST  15  /  ALT  14  /  AlkPhos  56  05-22          PTT - ( 22 May 2020 13:45 )  PTT:28.3 sec

## 2020-05-22 NOTE — H&P ADULT - NSICDXPASTMEDICALHX_GEN_ALL_CORE_FT
PAST MEDICAL HISTORY:  CHF (Congestive Heart Failure)     DVT of upper extremity (deep vein thrombosis)     Former smoker     GIB (gastrointestinal bleeding)     Hepatitis C virus     HLD (hyperlipidemia)     HTN     Knee pain, right     Non-Ischemic Cardiomyopathy     PAF (paroxysmal atrial fibrillation) on xarelto    SVT (Supraventricular Tachycardia)     Ventricular fibrillation s/p AICD PAST MEDICAL HISTORY:  CHF (Congestive Heart Failure)     DVT of upper extremity (deep vein thrombosis)     Former smoker     GIB (gastrointestinal bleeding)     H/O autoimmune hemolytic anemia     Hepatitis C virus     HLD (hyperlipidemia)     HTN     Knee pain, right     Non-Ischemic Cardiomyopathy     PAF (paroxysmal atrial fibrillation) on xarelto    SVT (Supraventricular Tachycardia)     Ventricular fibrillation s/p AICD

## 2020-05-22 NOTE — CONSULT NOTE ADULT - ASSESSMENT
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 Brought in for concerns of COVID infection which returned negative. 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  Currently being treated with high dose of prednisone and waiting for various viral PCR's to determine the etiology. There had been case reports of hemolytic anemia secondary to calcineurin inhibitors and various viruses including CMV, Parvovirus B 19, HHV, EBV etc. For now we'll check viral PCR's. I've discussed his case with hematology. Suspicion for post-transplant lymphoproliferative disorder is low given no prominent lymph notes on CT scan.      Kely Jha D.O.  Adv. HF and transplant fellow  [phone] 70 year old man with history of NICM s/p HM2 then heart transplant on 2/23/18 (coronary fistula, HCV + s/p Rx), prior bilateral subclavian DVT, prior antibody mediated rejection s/p IVIG/plasmapharesis/Rituximab, recent diagnosis of autoimmune hemolytic anemia and CKD (b/l Cr 1.4) was sent in from hematology outpatient clinic for transfusion(Hgb 6.1) and Rituxan due to inability to obtain access. COVID-19 PCR negative in ER. Beside low grade temp(100.4) in ER, if he develops fever, we need to obtain culture and empiric antibiotics but no signs or symptoms of infection at this time. He reported mild dyspnea in the past few days with walking around the house sometimes. He is satting well in RA in ER. CT chest per hematology's recommendation doesn't show infiltrates or opacities.    He appears well, euvolemic, and nontoxic. Tacrolimus levels had been subtherapeutic since discharge and tacrolimus has been titrated up from 6mg BID to 7mg BID. Now 8mg BID since 5/18.             Kely Jha D.O.  Adv. HF and transplant fellow  [phone] 70 year old man with history of NICM s/p HM2 then heart transplant on 2/23/18 (coronary fistula, HCV + s/p Rx), prior bilateral subclavian DVT, prior antibody mediated rejection s/p IVIG/plasmapharesis/Rituximab, recent diagnosis of autoimmune hemolytic anemia and CKD (b/l Cr 1.4) was sent in from hematology outpatient clinic for transfusion(Hgb 6.1) and Rituxan due to inability to obtain access. COVID-19 PCR negative in ER. Beside low grade temp(100.4) in ER, if he develops fever, we need to obtain culture and empiric antibiotics but no signs or symptoms of infection at this time. He reported mild dyspnea in the past few days with walking around the house sometimes. He is satting well in RA in ER. CT chest per hematology's recommendation doesn't show infiltrates or opacities. He appears well, euvolemic, and nontoxic.           Kely Jha D.O.  Adv. HF and transplant fellow  [phone]

## 2020-05-22 NOTE — ED ADULT NURSE REASSESSMENT NOTE - NS ED NURSE REASSESS COMMENT FT1
Pt appears comfortable and resting at present. Pt ready to go up to his room, waiting for bed assignment to give report. See ED adult flowsheet for VSS at present.
as per Blood bank, patient is positive for COVID antibodies and the order for packed red blood cells will not be ready for "acouple hours". SHELLY Barnes made aware.

## 2020-05-22 NOTE — H&P ADULT - NSHPSOCIALHISTORY_GEN_ALL_CORE
SOCIAL HISTORY:  Former Smoker: [X] Yes  [ ] No        PACK YEARS:                         WHEN QUIT?  ETOH use: [ ] Yes  [X] No              FREQUENCY / QUANTITY:  Ilicit Drug use:  [ ] Yes  [ X] No  Occupation: Retired   Live with: Alone   Assist device use: Ambulates with cane

## 2020-05-22 NOTE — ED CLERICAL - NS ED CLERK NOTE PRE-ARRIVAL INFORMATION; ADDITIONAL PRE-ARRIVAL INFORMATION
CC/Reason For referral:  AUTO-IMMUNE HEMOLYTIC ANEMIA AND HEART TX.  WAS @ [facility] FOR RITUXAN AND COULDN'T GAIN IV ACCESS  Preferred Consultant(if applicable):  Who admits for you (if needed):  Do you have documents you would like to fax over? NO  Would you still like to speak to an ED attending? PLEASE CALL AFTER PATIENT IS SEEN

## 2020-05-22 NOTE — ED PROVIDER NOTE - OBJECTIVE STATEMENT
70y m PMHx cardiac transplant 2018, non-ischemic cardiomyopathy, CKD (Cr. 1.4), aortic stenosis, GI bleed, autoimmune hemolytic anemia with weekly Rituxan sent in for low H/H and inability to get a line for weekly Rituxan treatment. Per EMS they were unable to get a line today, H/H 6.3/19.6. Pt feels well without complaint in ED although tachycardic and feels warm. Denies fever, chills, CP, SOB, cough, dec appetite, urinary symptoms, dizziness, LOC, melena, hematochezia, NVDC, weakness, rash, bruising, or abd pain.

## 2020-05-22 NOTE — H&P ADULT - NSHPREVIEWOFSYSTEMS_GEN_ALL_CORE
Review of Systems  GENERAL:  Fevers[] chills[] sweats[] fatigue[] weight loss[] weight gain []                                        NEURO:  parathesias[] seizures []  syncope []  confusion []                                                                                  EYES: glasses[]  blurry vision[]  discharge[] pain[] glaucoma []                                                                            ENMT:  difficulty hearing []  vertigo[]  dysphagia[] epistaxis[] recent dental work []                                      CV:  chest pain[] palpitations[] LOBATO [] diaphoresis [] edema[]                                                                                             RESPIRATORY:  wheezing[] SOB[] cough [] sputum[] hemoptysis[]                                                                    GI:  nausea[]  vomiting []  diarrhea[] constipation [] melena []                                                                        : hematuria[ ]  dysuria[ ] urgency[] incontinence[]                                                                                              MUSCULOSKELETAL:  arthritis[ ]  joint swelling [ ] muscle weakness [ ]                                                                  SKIN/BREAST:  rash[ ] itching [ ]  hair loss[ ] masses[ ]                                                                                                PSYCH:  dementia [ ] depression [ ] anxiety[ ]                                                                                                                  HEME/LYMPH:  bruises easily[ ] enlarged lymph nodes[ ] tender lymph nodes[ ]                                                 ENDOCRINE:  cold intolerance[ ] heat intolerance[ ] polydipsia[ ]

## 2020-05-22 NOTE — H&P ADULT - HISTORY OF PRESENT ILLNESS
History of Present Illness:    70y Male w/ NICM s/p HM2 s/p OHT on 2/23/18 with coronary fistula, HCV+ s/p Rx, prior antibody mediated rejection s/p IVIG plasmapharesis/Rituximab, CKD (baseline Cr 1.4), with recent admission for hemolytic anemia from 4/29-5/7. Presented today to [facility] for IV Rituxan therapy. But unable to obtain intravenous line access despite multiple attempts. Patient was also found to be severely anemic with a hemoglobin 6.1 and was sent in to [facility] ER for PRBC transfusion and Rituxan therapy.     Past Medical History  Knee pain, right  HLD (hyperlipidemia)  Former smoker  DVT of upper extremity (deep vein thrombosis)  Hepatitis C virus  GIB (gastrointestinal bleeding)  H/O prior ablation treatment: for Afib  Ventricular fibrillation: s/p AICD  PAF (paroxysmal atrial fibrillation): on xarelto  Non-Ischemic Cardiomyopathy  SVT (Supraventricular Tachycardia)  HTN  CHF (Congestive Heart Failure)      Past Surgical History  S/P right heart catheterization: biopsy multiple  H/O heart transplant: 2/2018  LVAD (left ventricular assist device) present  Status post left hip replacement  Hypertension  Hypertension  History of Prior Ablation Treatment: for afib  AICD (Automatic Cardioverter/Defibrillator) Present: [employer] with 1 [employer] lead4/1/09- explanted and replaced with Medtronic 2 leads on 9/2/09 History of Present Illness:    70y Male w/ NICM s/p HM2 s/p OHT on 2/23/18 with coronary fistula, HCV+ s/p Rx, prior antibody mediated rejection s/p IVIG plasmapharesis/Rituximab, CKD (baseline Cr 1.4), with recent admission for hemolytic anemia from 4/29-5/7. Presented today to [facility] for IV Rituxan therapy. But unable to obtain intravenous line access despite multiple attempts. Patient was also found to be severely anemic with a hemoglobin 6.1 and was sent in to [facility] ER for PRBC transfusion and Rituxan therapy.     Past Medical History  Knee pain, right  HLD (hyperlipidemia)  Former smoker  DVT of upper extremity (deep vein thrombosis)  Hepatitis C virus  GIB (gastrointestinal bleeding)  H/O prior ablation treatment: for Afib  Ventricular fibrillation: s/p AICD  PAF (paroxysmal atrial fibrillation): on xarelto  Non-Ischemic Cardiomyopathy  SVT (Supraventricular Tachycardia)  HTN  CHF (Congestive Heart Failure)    Past Surgical History  S/P right heart catheterization: biopsy multiple  H/O heart transplant: 2/2018  LVAD (left ventricular assist device) present  Status post left hip replacement  Hypertension  Hypertension  History of Prior Ablation Treatment: for afib  AICD (Automatic Cardioverter/Defibrillator) Present: [employer] with 1 [employer] lead4/1/09- explanted and replaced with Medtronic 2 leads on 9/2/09

## 2020-05-22 NOTE — ED PROVIDER NOTE - ATTENDING CONTRIBUTION TO CARE
Dr Dunne Note: · 70y M with pmhx cardiac transplant 2018, non-ischemic cardiomyopathy, CKD (Cr. 1.4), aortic stenosis, GI bleed, autoimmune hemolytic anemia with weekly Rituxan sent in as he has low H/H and  outpatient center was unable to get IV access for Rituxan treatment.   Pt denies any fevers, cp, sob, abdominal pain, N/V/D, dysuria, focal weakness, numbness or tingling.    Agree with above exam     Anemia- will check labs, transfuse as needed  Fever/tachy- infectious workup - labs, Bld, cx, cxr, ua, ucx, covid swab     anticipate admission

## 2020-05-22 NOTE — ED PROVIDER NOTE - PSH
AICD (Automatic Cardioverter/Defibrillator) Present  [employer] with 1 [employer] lead4/1/09- explanted and replaced with [employer]tronic 2 leads on 9/2/09  H/O heart transplant  2/2018  History of Prior Ablation Treatment  for afib  S/P right heart catheterization  biopsy multiple  Status post left hip replacement

## 2020-05-22 NOTE — H&P ADULT - NSICDXPASTSURGICALHX_GEN_ALL_CORE_FT
PAST SURGICAL HISTORY:  AICD (Automatic Cardioverter/Defibrillator) Present [employer] with 1 [employer] lead4/1/09- explanted and replaced with [employer] 2 leads on 9/2/09    H/O heart transplant 2/2018    History of Prior Ablation Treatment for afib    S/P right heart catheterization biopsy multiple    Status post left hip replacement PAST SURGICAL HISTORY:  AICD (Automatic Cardioverter/Defibrillator) Present [employer] with 1 [employer] lead4/1/09- explanted and replaced with Subar[employer] Limitedtronic 2 leads on 9/2/09    H/O heart transplant 2/2018    History of Prior Ablation Treatment for afib    S/P right heart catheterization biopsy multiple    Status post left hip replacement

## 2020-05-22 NOTE — H&P ADULT - PROBLEM SELECTOR PLAN 2
CHF following   Continue with  tacrolimus 8mg q12H; obtain tacrolimus level before 8am dose tomorrow.   ID consult to be called in AM   Continue Cellcept 500 mg BID   Continue Valcyte 450mg daily for CMV prophylaxis.  Continue Bactrim DS daily   Resume ASA 81mg daily once H/H stable. No active bleeding.  Continue pravastatin 20mg bedtime.  Continue MgOx for medication- induced hypomagnesemia. CHF following   Continue with  tacrolimus 8mg q12H; obtain tacrolimus level before 8am dose tomorrow.   ID consult to be called in AM   Continue Cellcept 500 mg BID   Continue Valcyte 450mg daily for CMV prophylaxis.  Continue Bactrim DS daily   Resume ASA 81mg daily once H/H stable. No active bleeding.  Continue MgOx for medication- induced hypomagnesemia.

## 2020-05-22 NOTE — ED PROVIDER NOTE - GASTROINTESTINAL, MLM
Abdomen soft, non-tender, no guarding. Chaperone RN Ben rectal tone good, light brown stool without BRBPR

## 2020-05-22 NOTE — CONSULT NOTE ADULT - SUBJECTIVE AND OBJECTIVE BOX
Chief Complaint:    HPI:  68 y/o M w/ h/o NICM s/p HM2 s/p OHT 2/23/18 with coronary fistula, HCV + s/p Rx, prior b/l subclavian DVT s/p Rx, prior [facility] s/p IVIG/plasmapharesis/Rituximab, CKD (b/l Cr 1.4) who presented w/ atypical chest pain and abdominal tightness. Brought in for concerns of COVID infection which returned negative. 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  Currently being treated with high dose of prednisone and waiting for various viral PCR's to determine the etiology. There had been case reports of hemolytic anemia secondary to calcineurin inhibitors and various viruses including CMV, Parvovirus B 19, HHV, EBV etc. For now we'll check viral PCR's. I've discussed his case with hematology. Suspicion for post-transplant lymphoproliferative disorder is low given no prominent lymph notes on CT scan.      PMHx:   Knee pain, right  HLD (hyperlipidemia)  Former smoker  DVT of upper extremity (deep vein thrombosis)  Hepatitis C virus  GIB (gastrointestinal bleeding)  H/O prior ablation treatment  Ventricular fibrillation  PAF (paroxysmal atrial fibrillation)  Non-Ischemic Cardiomyopathy  SVT (Supraventricular Tachycardia)  HTN  CHF (Congestive Heart Failure)      PSHx:   S/P right heart catheterization  H/O heart transplant  LVAD (left ventricular assist device) present  Status post left hip replacement  Hypertension  Hypertension  History of Prior Ablation Treatment  AICD (Automatic Cardioverter/Defibrillator) Present      Allergies:  No Known Allergies      Home Meds:    Current Medications:   folic acid 1 milliGRAM(s) Oral <User Schedule>  mycophenolate mofetil 500 milliGRAM(s) Oral <User Schedule>  predniSONE   Tablet 70 milliGRAM(s) Oral <User Schedule>  sodium bicarbonate 650 milliGRAM(s) Oral <User Schedule>  tacrolimus 8 milliGRAM(s) Oral <User Schedule>  valGANciclovir 450 milliGRAM(s) Oral <User Schedule>      FAMILY HISTORY:  No pertinent family history in first degree relatives      Social History:  Smoking History: denies  Alcohol Use: denies  Drug Use: denies    REVIEW OF SYSTEMS:  CONSTITUTIONAL: No weakness, fevers or chills  EYES/ENT: No visual changes;  No dysphagia  NECK: No pain or stiffness  RESPIRATORY: No cough, wheezing, hemoptysis; No shortness of breath  CARDIOVASCULAR: No chest pain or palpitations; No lower extremity edema  GASTROINTESTINAL: No abdominal or epigastric pain. No nausea, vomiting, or hematemesis; No diarrhea or constipation. No melena or hematochezia.  BACK: No back pain  GENITOURINARY: No dysuria, frequency or hematuria  NEUROLOGICAL: No numbness or weakness  SKIN: No itching, burning, rashes, or lesions   All other review of systems is negative unless indicated above.        Physical Exam:  T(F): 97.6 (05-22), Max: 100.3 (05-22)  HR: 128 (05-22) (110 - 128)  BP: 118/83 (05-22) (93/65 - 121/87)  RR: 18 (05-22)  SpO2: 99% (05-22)  GENERAL: No acute distress, well-developed  HEAD:  Atraumatic, Normocephalic  ENT: EOMI, PERRLA, conjunctiva and sclera clear, Neck supple, No JVD, moist mucosa  CHEST/LUNG: Clear to auscultation bilaterally; No wheeze, equal breath sounds bilaterally   BACK: No spinal tenderness  HEART: Regular rate and rhythm; No murmurs, rubs, or gallops  ABDOMEN: Soft, Nontender, Nondistended; Bowel sounds present  EXTREMITIES:  No clubbing, cyanosis, or edema  PSYCH: Nl behavior, nl affect  NEUROLOGY: AAOx3, non-focal, cranial nerves intact  SKIN: Normal color, No rashes or lesions  LINES:      Labs: Personally reviewed                        6.1    5.90  )-----------( 110      ( 22 May 2020 13:45 )             19.5     05-22    136  |  102  |  25<H>  ----------------------------<  101<H>  3.9   |  24  |  1.27    Ca    8.2<L>      22 May 2020 13:45    TPro  6.3  /  Alb  3.2<L>  /  TBili  1.2  /  DBili  x   /  AST  15  /  ALT  14  /  AlkPhos  56  05-22    PTT - ( 22 May 2020 13:45 )  PTT:28.3 sec    CARDIAC MARKERS ( 22 May 2020 13:45 )  34 ng/L / x     / x     / x     / x     / x            Cardiovascular Diagnostic Testing:    ECG: Personally reviewed:    Echo: Personally reviewed:    Stress Testing:    Cath:    CXR: Personally reviewed Chief Complaint: Sent from hematology    HPI:  70 year old man with history of NICM s/p HM2 then heart transplant on 2/23/18 (coronary fistula, HCV + s/p Rx), prior bilateral subclavian DVT, prior antibody mediated rejection s/p IVIG/plasmapharesis/Rituximab, recent diagnosis of autoimmune hemolytic anemia and CKD (b/l Cr 1.4) was sent in from hematology outpatient clinic for transfusion(Hgb 6.1) and Rituxan due to inability to obtain access. COVID-19 PCR negative in ER. Beside low grade temp(100.4) in ER, denies fever, chills, cough, nausea, vomiting, abdominal pain, diarrhea, edema, orthopnea, PND, lightheadedness, syncope, or sick contact, He reports mild shortness of breath with walking around the house in the past 3 days.  He's been home since discharge.      PMHx:   Knee pain, right  HLD (hyperlipidemia)  Former smoker  DVT of upper extremity (deep vein thrombosis)  Hepatitis C virus  GIB (gastrointestinal bleeding)  H/O prior ablation treatment  Ventricular fibrillation  PAF (paroxysmal atrial fibrillation)  Non-Ischemic Cardiomyopathy  SVT (Supraventricular Tachycardia)  HTN  CHF (Congestive Heart Failure)      PSHx:   S/P right heart catheterization  H/O heart transplant  LVAD (left ventricular assist device) present  Status post left hip replacement  Hypertension  Hypertension  History of Prior Ablation Treatment  AICD (Automatic Cardioverter/Defibrillator) Present      Allergies:  No Known Allergies      Home Meds:  Reviewed      Current Medications:   folic acid 1 milliGRAM(s) Oral <User Schedule>  mycophenolate mofetil 500 milliGRAM(s) Oral <User Schedule>  predniSONE   Tablet 70 milliGRAM(s) Oral <User Schedule>  sodium bicarbonate 650 milliGRAM(s) Oral <User Schedule>  tacrolimus 8 milliGRAM(s) Oral <User Schedule>  valGANciclovir 450 milliGRAM(s) Oral <User Schedule>      FAMILY HISTORY:  No pertinent family history in first degree relatives      Social History:  Smoking History: denies  Alcohol Use: denies  Drug Use: denies    REVIEW OF SYSTEMS:  All 14 point review of systems is negative unless indicated above.        Physical Exam:  T(F): 97.6 (05-22), Max: 100.3 (05-22)  HR: 128 (05-22) (110 - 128)  BP: 118/83 (05-22) (93/65 - 121/87)  RR: 18 (05-22)  SpO2: 99% (05-22)    GENERAL: No acute distress, well-developed  ENT:  conjunctiva and sclera clear, Neck supple, No JVD, moist mucosa  CHEST/LUNG: Clear to auscultation bilaterally; No wheeze, equal breath sounds bilaterally   HEART: IV/VI systolic murmur, regular rate and rhythm   ABDOMEN: Soft, Nontender, Nondistended; Bowel sounds present  EXTREMITIES:  No clubbing, cyanosis, or edema  PSYCH: Nl behavior, nl affect  NEUROLOGY: AAOx3, grossly non-focal  SKIN: Normal color, No rashes or lesions        Labs: Personally reviewed                        6.1    5.90  )-----------( 110      ( 22 May 2020 13:45 )             19.5     05-22    136  |  102  |  25<H>  ----------------------------<  101<H>  3.9   |  24  |  1.27    Ca    8.2<L>      22 May 2020 13:45    TPro  6.3  /  Alb  3.2<L>  /  TBili  1.2  /  DBili  x   /  AST  15  /  ALT  14  /  AlkPhos  56  05-22    PTT - ( 22 May 2020 13:45 )  PTT:28.3 sec    CARDIAC MARKERS ( 22 May 2020 13:45 )  34 ng/L / x     / x     / x     / x     / x

## 2020-05-22 NOTE — CONSULT NOTE ADULT - PROBLEM SELECTOR RECOMMENDATION 2
RHC and biopsy 2/20 normal hemodynamics and neg for rejection  - Continue tacrolimus 8mg q12H; obtain tacrolimus level before 8am dose tomorrow.  - Continue Cellcept 500 mg BID (Lowered during admission due to high dose steroids. 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 CMV PCR 5/2 <1.70 (ask ID, double check on dosing)  - Continue Bactrim DS daily given history of nocardia infection (also PJP prophylaxis). RHC and biopsy 2/20 normal hemodynamics and neg for rejection.  Hep C + treated (Hep C PCR 5/4 undetectable)  - Continue tacrolimus 8mg q12H; obtain tacrolimus level before 8am dose tomorrow. He didn't take tacrolimus this morning. (outpatient Tac 3.4  5/18 dose increased to 8mg BID. It's been subtherapeutic as outpatient for unclear reason)  - Continue Cellcept 500 mg BID (Lowered during admission due to high dose steroids. 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 CMV PCR 5/2 <1.70 (ask ID, double check on dosing)  - Continue Bactrim DS daily given history of nocardia infection (also PJP prophylaxis). RHC and biopsy 2/20 normal hemodynamics and neg for rejection.  Hep C + treated (Hep C PCR 5/4 undetectable)  - Continue tacrolimus 8mg q12H; obtain tacrolimus level before 8am dose tomorrow. He didn't take tacrolimus this morning. (outpatient Tac 3.4  5/18 dose increased to 8mg BID. It's been subtherapeutic as outpatient for unclear reason)  - Continue Cellcept 500 mg BID (Lowered during admission due to high dose steroids. 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 CMV PCR 5/2 <1.70 (ask ID, double check on dosing)  - Continue Bactrim DS daily given history of nocardia infection (also PJP prophylaxis).  - Continue ASA 81mg daily and pravastatin 20mg bedtime.  - Continue MgOx for medication- induced hypomagnesemia. RHC and biopsy 2/20 normal hemodynamics and neg for rejection.  Hep C + treated (Hep C PCR 5/4 undetectable)  - Continue tacrolimus 8mg q12H; obtain tacrolimus level before 8am dose tomorrow. He didn't take tacrolimus this morning. (outpatient Tac 3.4  5/18 dose increased to 8mg BID. It's been subtherapeutic as outpatient for unclear reason)  - Continue Cellcept 500 mg BID (Lowered during admission due to high dose steroids. 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 CMV PCR 5/2 <1.70 (ask ID, double check on dosing)  - Continue Bactrim DS daily given history of nocardia infection (also PJP prophylaxis).  - Resume ASA 81mg daily once H/H stable. No active bleeding.  - Continue pravastatin 20mg bedtime.  - Continue MgOx for medication- induced hypomagnesemia.

## 2020-05-22 NOTE — H&P ADULT - NSHPLABSRESULTS_GEN_ALL_CORE
LABS:                        6.1    5.90  )-----------( 110      ( 22 May 2020 13:45 )             19.5         136  |  102  |  25<H>  ----------------------------<  101<H>  3.9   |  24  |  1.27    Ca    8.2<L>      22 May 2020 13:45    TPro  6.3  /  Alb  3.2<L>  /  TBili  1.2  /  DBili  x   /  AST  15  /  ALT  14  /  AlkPhos  56      PTT - ( 22 May 2020 13:45 )  PTT:28.3 sec  Urinalysis Basic - ( 22 May 2020 19:56 )    Color: Yellow / Appearance: Clear / S.022 / pH: x  Gluc: x / Ketone: Negative  / Bili: Negative / Urobili: Negative   Blood: x / Protein: 30 mg/dL / Nitrite: Negative   Leuk Esterase: Negative / RBC: 1 /hpf / WBC 2 /HPF   Sq Epi: x / Non Sq Epi: 2 /hpf / Bacteria: Negative    < from: CT Chest No Cont (20 @ 20:29) >    Lungs And Airways: The right diaphragm is elevated. Within the right upper lobe is a linear area of scarring (image 53), a finding that is unchanged.    The additional foci of atelectasis/scarring in the right middle lobe and right lower lobe and to a lesser extent the lingula are unchanged. Emphysema. The airways are unremarkable.      Pleura: No pleural effusion or pneumothorax.    Mediastinum: The visualized thyroid gland is unremarkable. The esophagus is unremarkable. The upper and lower paratracheal chest lymph nodes visualized measure less than 5 mm in the short axis.    Heart and Vasculature: The heart is normal in size. No pericardial effusion. The aorta is unremarkable. The pulmonary artery is unremarkable.    Upper Abdomen: Cholelithiasis. The 1.2 x 1.5 cm left adrenal adenoma is unchanged.    Bones And Soft Tissues: Sternotomy. Degenerative change of the spine.  Ossifications dorsal to the left pectoralis major muscle. Gynecomastia.      IMPRESSION:     1.  No change in the right upper lobe linear opacity likely due to scarring or the additional bilateral linear opacities likely due to atelectasis or scarring.  2.  Emphysema.  3.  No enlarged chest lymph nodes. LABS:                        6.1    5.90  )-----------( 110      ( 22 May 2020 13:45 )             19.5         136  |  102  |  25<H>  ----------------------------<  101<H>  3.9   |  24  |  1.27    Ca    8.2<L>      22 May 2020 13:45    TPro  6.3  /  Alb  3.2<L>  /  TBili  1.2  /  DBili  x   /  AST  15  /  ALT  14  /  AlkPhos  56      PTT - ( 22 May 2020 13:45 )  PTT:28.3 sec  Urinalysis Basic - ( 22 May 2020 19:56 )    Color: Yellow / Appearance: Clear / S.022 / pH: x  Gluc: x / Ketone: Negative  / Bili: Negative / Urobili: Negative   Blood: x / Protein: 30 mg/dL / Nitrite: Negative   Leuk Esterase: Negative / RBC: 1 /hpf / WBC 2 /HPF   Sq Epi: x / Non Sq Epi: 2 /hpf / Bacteria: Negative    CT Chest No Cont (20 @ 20:29):     Lungs And Airways: The right diaphragm is elevated. Within the right upper lobe is a linear area of scarring (image 53), a finding that is unchanged.    The additional foci of atelectasis/scarring in the right middle lobe and right lower lobe and to a lesser extent the lingula are unchanged. Emphysema. The airways are unremarkable.      Pleura: No pleural effusion or pneumothorax.    Mediastinum: The visualized thyroid gland is unremarkable. The esophagus is unremarkable. The upper and lower paratracheal chest lymph nodes visualized measure less than 5 mm in the short axis.    Heart and Vasculature: The heart is normal in size. No pericardial effusion. The aorta is unremarkable. The pulmonary artery is unremarkable.    Upper Abdomen: Cholelithiasis. The 1.2 x 1.5 cm left adrenal adenoma is unchanged.    Bones And Soft Tissues: Sternotomy. Degenerative change of the spine.  Ossifications dorsal to the left pectoralis major muscle. Gynecomastia.      IMPRESSION:     1.  No change in the right upper lobe linear opacity likely due to scarring or the additional bilateral linear opacities likely due to atelectasis or scarring.  2.  Emphysema.  3.  No enlarged chest lymph nodes.

## 2020-05-22 NOTE — CONSULT NOTE ADULT - ASSESSMENT
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     # Jacky positive (IgG) hemolytic anemia:   - underlying etiology unknown - infectious workup was negative on last admission (PCR negative: EBV, parvovirus, Hep B/C, HIV)  - CT A/P negative for lymphadenopathy  - possibly related to medications (tacrolimus), but generally seen within 1 year of starting medication    - proving to be steroid refractory: has been on prednisone 70-75mg since 5/4 with continued decrease in hemoglobin  - check CBC with DIFF, CMP, fractionated bilirubin, LDH, haptoglobin, Jacky     - will need to get rituximab inpatient, first time.  Plan for tomorrow 5/23.  Will write chemo order and obtain consent.     Will follow.     Isabelle Trinidad MD  Hematology/Oncology Fellow, PGY-5  pager: [phone]  After 5pm or on weekends, please page the on-call fellow. 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:  - underlying etiology unknown - infectious workup was negative on last admission (PCR negative: EBV, parvovirus, Hep B/C, HIV)  - CT A/P negative for lymphadenopathy  - peripheral flow cytometry 5/2: negative  - possibly related to medications (tacrolimus), but generally seen within 1 year of starting medication    - proving to be steroid refractory: has been on prednisone 70-75mg since 5/4 with continued decrease in hemoglobin  - check CBC with DIFF, CMP, fractionated bilirubin, LDH, haptoglobin, Jacky, MIGUELINA, RF, ANCA  - check CT Chest w/o contrast (assess for any lymphadenopathy)  - continue prednisone 70mg daily  - continue folic acid 1mg daily  - would start Bactrim DS  on Mon/Wed/Fri for PCP prophylaxis as patient on steroids   - would recommend ID consult - only viral PCR that was detectable from prior admission was CMV    - will need to get rituximab inpatient, first time.  Plan for tomorrow 5/23.  Will write chemo order and obtain consent.     Will follow.     Isabelle Trinidad MD  Hematology/Oncology Fellow, PGY-5  pager: [phone]  After 5pm or on weekends, please page the on-call fellow. 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   Receiving 2u pRBCS in the ED.     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:  - underlying etiology unknown - infectious workup was negative on last admission (PCR negative: EBV, parvovirus, Hep B/C, HIV)  - CT A/P negative for lymphadenopathy  - peripheral flow cytometry 5/2: negative  - possibly related to medications (tacrolimus), but generally seen within 1 year of starting medication    - proving to be steroid refractory: has been on prednisone 70-75mg since 5/4 with continued decrease in hemoglobin  - check CBC with DIFF, CMP, fractionated bilirubin, LDH, haptoglobin, Jacky, MIGUELINA, RF, ANCA  - check CT Chest w/o contrast (assess for any lymphadenopathy)  - continue prednisone 70mg daily  - continue folic acid 1mg daily  - would start Bactrim DS  on Mon/Wed/Fri for PCP prophylaxis as patient on steroids   - would recommend ID consult - only viral PCR that was detectable from prior admission was CMV    - will need to get rituximab inpatient, first time.  Plan for tomorrow 5/23.  Will write chemo order and obtain consent.     Will follow.     Isabelle Trinidad MD  Hematology/Oncology Fellow, PGY-5  pager: [phone]  After 5pm or on weekends, please page the on-call fellow.

## 2020-05-23 ENCOUNTER — APPOINTMENT (OUTPATIENT)
Dept: INFUSION THERAPY | Facility: HOSPITAL | Age: 70
End: 2020-05-23

## 2020-05-23 LAB
ALBUMIN SERPL ELPH-MCNC: 3.6 G/DL — SIGNIFICANT CHANGE UP (ref 3.3–5)
ALP SERPL-CCNC: 75 U/L — SIGNIFICANT CHANGE UP (ref 40–120)
ALT FLD-CCNC: 18 U/L — SIGNIFICANT CHANGE UP (ref 10–45)
ANION GAP SERPL CALC-SCNC: 10 MMOL/L — SIGNIFICANT CHANGE UP (ref 5–17)
AST SERPL-CCNC: 18 U/L — SIGNIFICANT CHANGE UP (ref 10–40)
BILIRUB DIRECT SERPL-MCNC: 0.5 MG/DL — HIGH (ref 0–0.2)
BILIRUB INDIRECT FLD-MCNC: 2.2 MG/DL — HIGH (ref 0.2–1)
BILIRUB SERPL-MCNC: 2.7 MG/DL — HIGH (ref 0.2–1.2)
BILIRUB SERPL-MCNC: 2.7 MG/DL — HIGH (ref 0.2–1.2)
BUN SERPL-MCNC: 26 MG/DL — HIGH (ref 7–23)
CALCIUM SERPL-MCNC: 8.4 MG/DL — SIGNIFICANT CHANGE UP (ref 8.4–10.5)
CHLORIDE SERPL-SCNC: 99 MMOL/L — SIGNIFICANT CHANGE UP (ref 96–108)
CK SERPL-CCNC: 38 U/L — SIGNIFICANT CHANGE UP (ref 30–200)
CO2 SERPL-SCNC: 24 MMOL/L — SIGNIFICANT CHANGE UP (ref 22–31)
CREAT SERPL-MCNC: 1.14 MG/DL — SIGNIFICANT CHANGE UP (ref 0.5–1.3)
CRP SERPL-MCNC: 6.04 MG/DL — HIGH (ref 0–0.4)
D DIMER BLD IA.RAPID-MCNC: 246 NG/ML DDU — HIGH
FERRITIN SERPL-MCNC: 591 NG/ML — HIGH (ref 30–400)
GLUCOSE SERPL-MCNC: 242 MG/DL — HIGH (ref 70–99)
HAPTOGLOB SERPL-MCNC: 83 MG/DL — SIGNIFICANT CHANGE UP (ref 34–200)
HCT VFR BLD CALC: 26.6 % — LOW (ref 39–50)
HGB BLD-MCNC: 8.7 G/DL — LOW (ref 13–17)
INR BLD: 1.18 RATIO — HIGH (ref 0.88–1.16)
LDH SERPL L TO P-CCNC: 253 U/L — HIGH (ref 50–242)
LDH SERPL L TO P-CCNC: 258 U/L — HIGH (ref 50–242)
MCHC RBC-ENTMCNC: 30.1 PG — SIGNIFICANT CHANGE UP (ref 27–34)
MCHC RBC-ENTMCNC: 32.7 GM/DL — SIGNIFICANT CHANGE UP (ref 32–36)
MCV RBC AUTO: 92 FL — SIGNIFICANT CHANGE UP (ref 80–100)
NRBC # BLD: 0 /100 WBCS — SIGNIFICANT CHANGE UP (ref 0–0)
PLATELET # BLD AUTO: 118 K/UL — LOW (ref 150–400)
POTASSIUM SERPL-MCNC: 4.1 MMOL/L — SIGNIFICANT CHANGE UP (ref 3.5–5.3)
POTASSIUM SERPL-SCNC: 4.1 MMOL/L — SIGNIFICANT CHANGE UP (ref 3.5–5.3)
PROCALCITONIN SERPL-MCNC: 0.37 NG/ML — HIGH (ref 0.02–0.1)
PROT SERPL-MCNC: 6.9 G/DL — SIGNIFICANT CHANGE UP (ref 6–8.3)
PROTHROM AB SERPL-ACNC: 13.5 SEC — HIGH (ref 10–12.9)
RBC # BLD: 2.89 M/UL — LOW (ref 4.2–5.8)
RBC # FLD: 14.9 % — HIGH (ref 10.3–14.5)
RHEUMATOID FACT SERPL-ACNC: <10 IU/ML — SIGNIFICANT CHANGE UP (ref 0–13)
SARS-COV-2 RNA SPEC QL NAA+PROBE: SIGNIFICANT CHANGE UP
SODIUM SERPL-SCNC: 133 MMOL/L — LOW (ref 135–145)
TACROLIMUS SERPL-MCNC: 6.1 NG/ML — SIGNIFICANT CHANGE UP
WBC # BLD: 4.74 K/UL — SIGNIFICANT CHANGE UP (ref 3.8–10.5)
WBC # FLD AUTO: 4.74 K/UL — SIGNIFICANT CHANGE UP (ref 3.8–10.5)

## 2020-05-23 PROCEDURE — 99232 SBSQ HOSP IP/OBS MODERATE 35: CPT

## 2020-05-23 RX ORDER — PANTOPRAZOLE SODIUM 20 MG/1
40 TABLET, DELAYED RELEASE ORAL
Refills: 0 | Status: DISCONTINUED | OUTPATIENT
Start: 2020-05-23 | End: 2020-06-02

## 2020-05-23 RX ORDER — MEPERIDINE HYDROCHLORIDE 50 MG/ML
25 INJECTION INTRAMUSCULAR; INTRAVENOUS; SUBCUTANEOUS ONCE
Refills: 0 | Status: DISCONTINUED | OUTPATIENT
Start: 2020-05-23 | End: 2020-05-23

## 2020-05-23 RX ORDER — RITUXIMAB 10 MG/ML
712 INJECTION, SOLUTION INTRAVENOUS ONCE
Refills: 0 | Status: COMPLETED | OUTPATIENT
Start: 2020-05-23 | End: 2020-05-23

## 2020-05-23 RX ORDER — HYDROCORTISONE 20 MG
100 TABLET ORAL ONCE
Refills: 0 | Status: COMPLETED | OUTPATIENT
Start: 2020-05-23 | End: 2020-05-23

## 2020-05-23 RX ORDER — ACETAMINOPHEN 500 MG
650 TABLET ORAL ONCE
Refills: 0 | Status: COMPLETED | OUTPATIENT
Start: 2020-05-23 | End: 2020-05-23

## 2020-05-23 RX ORDER — ASPIRIN/CALCIUM CARB/MAGNESIUM 324 MG
81 TABLET ORAL DAILY
Refills: 0 | Status: DISCONTINUED | OUTPATIENT
Start: 2020-05-23 | End: 2020-06-02

## 2020-05-23 RX ORDER — DIPHENHYDRAMINE HCL 50 MG
25 CAPSULE ORAL ONCE
Refills: 0 | Status: COMPLETED | OUTPATIENT
Start: 2020-05-23 | End: 2020-05-23

## 2020-05-23 RX ADMIN — RITUXIMAB 712 MILLIGRAM(S): 10 INJECTION, SOLUTION INTRAVENOUS at 12:39

## 2020-05-23 RX ADMIN — Medication 1 MILLIGRAM(S): at 08:03

## 2020-05-23 RX ADMIN — Medication 25 MILLIGRAM(S): at 11:33

## 2020-05-23 RX ADMIN — Medication 100 MILLIGRAM(S): at 11:33

## 2020-05-23 RX ADMIN — Medication 1 TABLET(S): at 08:03

## 2020-05-23 RX ADMIN — MYCOPHENOLATE MOFETIL 500 MILLIGRAM(S): 250 CAPSULE ORAL at 08:03

## 2020-05-23 RX ADMIN — VALGANCICLOVIR 450 MILLIGRAM(S): 450 TABLET, FILM COATED ORAL at 20:03

## 2020-05-23 RX ADMIN — Medication 650 MILLIGRAM(S): at 08:03

## 2020-05-23 RX ADMIN — MYCOPHENOLATE MOFETIL 500 MILLIGRAM(S): 250 CAPSULE ORAL at 20:03

## 2020-05-23 RX ADMIN — TACROLIMUS 8 MILLIGRAM(S): 5 CAPSULE ORAL at 20:03

## 2020-05-23 RX ADMIN — Medication 70 MILLIGRAM(S): at 08:03

## 2020-05-23 RX ADMIN — Medication 650 MILLIGRAM(S): at 11:33

## 2020-05-23 RX ADMIN — Medication 81 MILLIGRAM(S): at 17:30

## 2020-05-23 RX ADMIN — TACROLIMUS 8 MILLIGRAM(S): 5 CAPSULE ORAL at 08:03

## 2020-05-23 RX ADMIN — Medication 650 MILLIGRAM(S): at 20:03

## 2020-05-23 RX ADMIN — VALGANCICLOVIR 450 MILLIGRAM(S): 450 TABLET, FILM COATED ORAL at 08:03

## 2020-05-23 RX ADMIN — MAGNESIUM OXIDE 400 MG ORAL TABLET 400 MILLIGRAM(S): 241.3 TABLET ORAL at 08:03

## 2020-05-23 RX ADMIN — Medication 20 MILLIGRAM(S): at 22:52

## 2020-05-23 NOTE — PROGRESS NOTE ADULT - ASSESSMENT
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 Presented today to [facility] for IV Rituxan therapy. But unable to obtain intravenous access despite multiple attempts. Patient was also found to be severely anemic with a hemoglobin 6.1 and was sent in to [facility] ER for PRBC transfusion and to initiate Rituxan therapy. In ER Occult stool negative. COVID-19 PCR negative. Noted a low grade temp(100.4).   Hospital Course:   Admitted to 2 [facility] Telemetry floor. Awaiting Type and Screen. 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 Infectious work up has been negative. Refractory to steroids  Plan to start Rituxan therapy in AM. 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 Presented today to [facility] for IV Rituxan therapy. But unable to obtain intravenous access despite multiple attempts. Patient was also found to be severely anemic with a hemoglobin 6.1 and was sent in to [facility] ER for PRBC transfusion and to initiate Rituxan therapy. In ER Occult stool negative. COVID-19 PCR negative. Noted a low grade temp(100.4).   Hospital Course:   Admitted to 2 [facility] Telemetry floor. Awaiting Type and Screen. 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 Infectious work up has been negative. Refractory to steroids  Plan to start Rituxan therapy in AM. CHF following  5/23 VSS; guaic neg; 2 units prbc transfused overnight; hemeonc following; Rituxan given today as per hemeonc  tacro level 6.1- continue tacro 8 bid

## 2020-05-23 NOTE — PROGRESS NOTE ADULT - SUBJECTIVE AND OBJECTIVE BOX
Medicine Follow-up    INTERVAL HPI/OVERNIGHT EVENTS:  No o/n events, patient resting comfortably. No complaints at this time. Patient specifically denies fever, chills, dizziness, weakness, CP, palpitations, SOB, cough, N/V/D/C. Walks back/forth to BR w/o LOBATO      VITAL SIGNS:  T(F): 97.7 (20 @ 07:02)  HR: 123 (20 @ 07:02)  BP: 129/89 (20 @ 07:02)  RR: 18 (20 @ 07:02)  SpO2: 99% (20 @ 07:02)  Wt(kg): --    20 @ 07:01  -  20 @ 07:00  --------------------------------------------------------  IN: 800 mL / OUT: 375 mL / NET: 425 mL    20 @ 07:01  -  20 @ 12:03  --------------------------------------------------------  IN: 220 mL / OUT: 0 mL / NET: 220 mL        PHYSICAL EXAM:    Constitutional: AAOx3, NAD   Eyes: EOMI, sclera non-icteric  Respiratory: CTA b/l, no wheezing, rhonchi, rales, with normal respiratory effort  Cardiovascular: RRR, normal S1S2  Gastrointestinal: soft, NTND, no masses palpable  Neurological: Grossly intact  Psych: normal affect    MEDICATIONS  (STANDING):  folic acid 1 milliGRAM(s) Oral <User Schedule>  magnesium oxide 400 milliGRAM(s) Oral <User Schedule>  mycophenolate mofetil 500 milliGRAM(s) Oral <User Schedule>  predniSONE   Tablet 70 milliGRAM(s) Oral <User Schedule>  riTUXimab IVPB (eMAR) 712 milliGRAM(s) IV Intermittent once  sodium bicarbonate 650 milliGRAM(s) Oral <User Schedule>  tacrolimus 8 milliGRAM(s) Oral <User Schedule>  trimethoprim  160 mG/sulfamethoxazole 800 mG 1 Tablet(s) Oral <User Schedule>  valGANciclovir 450 milliGRAM(s) Oral <User Schedule>    MEDICATIONS  (PRN):  meperidine     Injectable 25 milliGRAM(s) IV Push once PRN Rigors      No Known Allergies      LABS:                        8.7    4.74  )-----------( 118      ( 23 May 2020 07:47 )             26.6         133<L>  |  99  |  26<H>  ----------------------------<  242<H>  4.1   |  24  |  1.14    Ca    8.4      23 May 2020 07:47    TPro  6.9  /  Alb  3.6  /  TBili  2.7<H>  /  DBili  0.5<H>  /  AST  18  /  ALT  18  /  AlkPhos  75      PT/INR - ( 23 May 2020 11:15 )   PT: 13.5 sec;   INR: 1.18 ratio         PTT - ( 22 May 2020 13:45 )  PTT:28.3 sec Lactate Dehydrogenase, Serum: 253 U/L ( @ 11:15)  Lactate Dehydrogenase, Serum: 258 U/L ( @ 07:47)    Urinalysis Basic - ( 22 May 2020 19:56 )    Color: Yellow / Appearance: Clear / S.022 / pH: x  Gluc: x / Ketone: Negative  / Bili: Negative / Urobili: Negative   Blood: x / Protein: 30 mg/dL / Nitrite: Negative   Leuk Esterase: Negative / RBC: 1 /hpf / WBC 2 /HPF   Sq Epi: x / Non Sq Epi: 2 /hpf / Bacteria: Negative        RADIOLOGY & ADDITIONAL TESTS:  Studies reviewed.

## 2020-05-23 NOTE — PROGRESS NOTE ADULT - SUBJECTIVE AND OBJECTIVE BOX
VITAL SIGNS    Telemetry:    Vital Signs Last 24 Hrs  T(C): 36.5 (20 07:02), Max: 37.9 (20 @ 13:57)  T(F): 97.7 (20 07:02), Max: 100.3 (20 @ 13:57)  HR: 123 (20 07:02) (110 - 128)  BP: 129/89 (20 07:02) (102/68 - 129/89)  RR: 18 (20 07:02) (16 - 22)  SpO2: 99% (20 07:02) (98% - 100%)             07:01  -   07:00  --------------------------------------------------------  IN: 800 mL / OUT: 375 mL / NET: 425 mL    :01  -   @ 10:39  --------------------------------------------------------  IN: 220 mL / OUT: 0 mL / NET: 220 mL       Daily Height in cm: 172.72 (22 May 2020 13:07)    Daily Weight in k (22 May 2020 21:26)  Admit Wt: Drug Dosing Weight  Height (cm): 172.72 (22 May 2020 13:07)  Weight (kg): 77.1 (22 May 2020 13:07)  BMI (kg/m2): 25.8 (22 May 2020 13:07)  BSA (m2): 1.91 (22 May 2020 13:07)    Bilirubin Total, Serum: 2.7 mg/dL ( 07:47)  Bilirubin Total, Serum: 2.7 mg/dL ( 07:47)  Bilirubin Direct, Serum: 0.5 mg/dL (:47)  Bilirubin Total, Serum: 1.2 mg/dL ( @ 13:45)    CAPILLARY BLOOD GLUCOSE              acetaminophen   Tablet .. 650 milliGRAM(s) Oral once  diphenhydrAMINE   Injectable 25 milliGRAM(s) IV Push once  folic acid 1 milliGRAM(s) Oral <User Schedule>  hydrocortisone sodium succinate Injectable 100 milliGRAM(s) IV Push once PRN  magnesium oxide 400 milliGRAM(s) Oral <User Schedule>  meperidine     Injectable 25 milliGRAM(s) IV Push once PRN  mycophenolate mofetil 500 milliGRAM(s) Oral <User Schedule>  predniSONE   Tablet 70 milliGRAM(s) Oral <User Schedule>  riTUXimab IVPB (eMAR) 712 milliGRAM(s) IV Intermittent once  sodium bicarbonate 650 milliGRAM(s) Oral <User Schedule>  tacrolimus 8 milliGRAM(s) Oral <User Schedule>  trimethoprim  160 mG/sulfamethoxazole 800 mG 1 Tablet(s) Oral <User Schedule>  valGANciclovir 450 milliGRAM(s) Oral <User Schedule>      PHYSICAL EXAM    Subjective: "Hi.   Neurology: alert and oriented x 3, nonfocal, no gross deficits  CV : tele:  RSR  Sternal Wound :  CDI with dressing , Stable  Lungs: clear. RR easy, unlabored   Abdomen: soft, nontender, nondistended, positive bowel sounds, bowel movement   Neg N/V/D   :  pt voiding without difficulty   Extremities:   RUVALCABA; edema, neg calf tenderness.   PPP bilaterally      PW:  Chest tubes: VITAL SIGNS    Telemetry:  sr/st 115-130   Vital Signs Last 24 Hrs  T(C): 36.5 (20 @ 07:02), Max: 37.9 (20 @ 13:57)  T(F): 97.7 (20 @ 07:02), Max: 100.3 (20 @ 13:57)  HR: 123 (20 07:02) (110 - 128)  BP: 129/89 (20 07:02) (102/68 - 129/89)  RR: 18 (20 07:02) (16 - 22)  SpO2: 99% (20 07:02) (98% - 100%)             07:01  -   07:00  --------------------------------------------------------  IN: 800 mL / OUT: 375 mL / NET: 425 mL     07:01  -   @ 10:39  --------------------------------------------------------  IN: 220 mL / OUT: 0 mL / NET: 220 mL       Daily Height in cm: 172.72 (22 May 2020 13:07)    Daily Weight in k (22 May 2020 21:26)  Admit Wt: Drug Dosing Weight  Height (cm): 172.72 (22 May 2020 13:07)  Weight (kg): 77.1 (22 May 2020 13:07)  BMI (kg/m2): 25.8 (22 May 2020 13:07)  BSA (m2): 1.91 (22 May 2020 13:07)    Bilirubin Total, Serum: 2.7 mg/dL ( 07:47)  Bilirubin Total, Serum: 2.7 mg/dL ( 07:47)  Bilirubin Direct, Serum: 0.5 mg/dL (05-23 @ 07:47)  Bilirubin Total, Serum: 1.2 mg/dL ( @ 13:45)        acetaminophen   Tablet .. 650 milliGRAM(s) Oral once  diphenhydrAMINE   Injectable 25 milliGRAM(s) IV Push once  folic acid 1 milliGRAM(s) Oral <User Schedule>  hydrocortisone sodium succinate Injectable 100 milliGRAM(s) IV Push once PRN  magnesium oxide 400 milliGRAM(s) Oral <User Schedule>  meperidine     Injectable 25 milliGRAM(s) IV Push once PRN  mycophenolate mofetil 500 milliGRAM(s) Oral <User Schedule>  predniSONE   Tablet 70 milliGRAM(s) Oral <User Schedule>  riTUXimab IVPB (eMAR) 712 milliGRAM(s) IV Intermittent once  sodium bicarbonate 650 milliGRAM(s) Oral <User Schedule>  tacrolimus 8 milliGRAM(s) Oral <User Schedule>  trimethoprim  160 mG/sulfamethoxazole 800 mG 1 Tablet(s) Oral <User Schedule>  valGANciclovir 450 milliGRAM(s) Oral <User Schedule>      PHYSICAL EXAM    Subjective: "I felt weak but I'm better now."   Neurology: alert and oriented x 3, nonfocal, no gross deficits  CV : tele:  sr/st 115-130   Sternal Wound :  CDI NAZ - well - healed   Lungs: clear. RR easy, unlabored   Abdomen: soft, nontender, nondistended, positive bowel sounds, + bowel movement   Neg N/V/D   :  pt voiding without difficulty   Extremities:   RUVALCABA; neg  LE edema, neg calf tenderness.   PPP bilaterally

## 2020-05-23 NOTE — PROGRESS NOTE ADULT - PROBLEM SELECTOR PLAN 2
CHF following   Continue with  tacrolimus 8mg q12H; obtain tacrolimus level before 8am dose tomorrow.   ID consult to be called in AM   Continue Cellcept 500 mg BID   Continue Valcyte 450mg daily for CMV prophylaxis.  Continue Bactrim DS daily   Resume ASA 81mg daily once H/H stable. No active bleeding.  Continue MgOx for medication- induced hypomagnesemia. CHF following   Continue with  tacrolimus 8mg q12H;   Continue Cellcept 500 mg BID   Continue Valcyte 450mg daily for CMV prophylaxis.  Continue Bactrim DS daily   Resume ASA 81mg daily once H/H stable. No active bleeding.  Continue MgOx for medication- induced hypomagnesemia.

## 2020-05-23 NOTE — ADVANCED PRACTICE NURSE CONSULT - ASSESSMENT
Cycle 1, day 1/1,Height and weight BSA verified. Lab results as per Md siena aware of same. Vital signs stable prior to chemotherapy, and  within accepectable parameters, see sunrise.  Pt education done re rituxan regimen, drug effects and potential side effects,  pt states understanding. . Pt with L hand peripheral iv  line, site free from signs and symptoms of infection, good blood return obtained. Pre- medicated with tylenol 650 mg po benadryl 50 mg iv rituxan 375 mg/j7=114 mg ivss infused at 50 ml/hr increased q 30 min by 50 ml/hr to max rate of 400 ml/hr tolerated w/o incident vss throughout 112-120/74-82 pulse 106-108 pulse ox 98% temp 36.8

## 2020-05-23 NOTE — PROGRESS NOTE ADULT - PROBLEM SELECTOR PLAN 1
Hematology / oncology following  Transfuse with 2 units PRBC   check CBC with DIFF, CMP, fractionated bilirubin, LDH, haptoglobin, Jacky, MIGUELINA, RF, ANCA  CT Chest w/o contrast negative for any lymphadenopathy  Continue prednisone 70mg daily  Continue folic acid 1mg daily  Would start Bactrim DS on Mon/Wed/Fri for PCP prophylaxis as patient on steroids   Recommend ID consult - only viral PCR that was detectable from prior admission was CMV  Plan to get rituximab inpatient for tomorrow 5/23. Hematology / oncology following  Transfuse with 2 units PRBC   check CBC with DIFF, CMP, fractionated bilirubin, LDH, haptoglobin, Jacky, MIGUELINA, RF, ANCA  CT Chest w/o contrast negative for any lymphadenopathy  Continue prednisone 70mg daily  Continue folic acid 1mg daily  Would start Bactrim DS on Mon/Wed/Fri for PCP prophylaxis as patient on steroids   Recommend ID consult - only viral PCR that was detectable from prior admission was CMV  rituximab 5/23 given today as per hemeonc

## 2020-05-23 NOTE — PROGRESS NOTE ADULT - PROVIDER SPECIALTY LIST ADULT
CT Surgery You have been diagnosed with Dry eye syndrome.  Symptoms of Dry eye syndrome can include double vision, eye pain, blurry vision, stinging, burning, tearing, eye redness.      Some useful instructions are listed below:     1.  Use artificial tears on a regular basis.  If you use artificial tear drops with preservative, you can use them up to 4-6 times per day. If you use artificial tear drops without preservatives, then you can use them more often.      2.  Wash your eyelashes with hot water.  Do not make the water so hot that you burn yourself, but the water should be more than just warm.  Often patients will wash their eyelashes in the shower under the hot water.  You should rub gently along the base of your eyelashes.      3.  Taking fish oil capsules twice a day for a month and then once a day after that can help improve Dry eye symptoms.      4.  Drink a lot of water.  Hydration can be helpful.      5.  Humidify your environment.      6.  If this is not beneficial, other treatments can include punctal plugs or cautery, corticosteroid eye drops, Restasis, Lipiflow, or autologous serum.  If you are still struggling with dry eye symptoms, call Dr. Cruz's office for other therapies or a referral to a dry eye specialist.

## 2020-05-23 NOTE — PROGRESS NOTE ADULT - ASSESSMENT
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   Receiving 2u pRBCS in the ED.     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:  - underlying etiology unknown - infectious workup was negative on last admission (PCR negative: EBV, parvovirus, Hep B/C, HIV)  - CT CA/P negative for lymphadenopathy  - peripheral flow cytometry 5/2: negative  - possibly related to medications (tacrolimus), but generally seen within 1 year of starting medication    - proving to be steroid refractory: has been on prednisone 70-75mg since 5/4 with continued decrease in hemoglobin    - continue prednisone 70mg daily  - continue folic acid 1mg daily  - would start Bactrim DS  on Mon/Wed/Fri for PCP prophylaxis as patient on steroids   - would recommend ID consult - only viral PCR that was detectable from prior admission was CMV    - For Rituximab as inpatient, first time.  Plan for today 5/23; py expressed understanding of risks benefits adn side efefcts; consent was obtained on 5/22/20. Will need daily CBC/diff, CMP, fractionated bili, LDH, haptoglobin.      Will follow.

## 2020-05-24 LAB
ANION GAP SERPL CALC-SCNC: 12 MMOL/L — SIGNIFICANT CHANGE UP (ref 5–17)
BUN SERPL-MCNC: 32 MG/DL — HIGH (ref 7–23)
CALCIUM SERPL-MCNC: 9 MG/DL — SIGNIFICANT CHANGE UP (ref 8.4–10.5)
CHLORIDE SERPL-SCNC: 102 MMOL/L — SIGNIFICANT CHANGE UP (ref 96–108)
CO2 SERPL-SCNC: 23 MMOL/L — SIGNIFICANT CHANGE UP (ref 22–31)
CREAT SERPL-MCNC: 1.24 MG/DL — SIGNIFICANT CHANGE UP (ref 0.5–1.3)
GLUCOSE SERPL-MCNC: 102 MG/DL — HIGH (ref 70–99)
HCT VFR BLD CALC: 24.2 % — LOW (ref 39–50)
HGB BLD-MCNC: 7.7 G/DL — LOW (ref 13–17)
MCHC RBC-ENTMCNC: 29.4 PG — SIGNIFICANT CHANGE UP (ref 27–34)
MCHC RBC-ENTMCNC: 31.8 GM/DL — LOW (ref 32–36)
MCV RBC AUTO: 92.4 FL — SIGNIFICANT CHANGE UP (ref 80–100)
NRBC # BLD: 0 /100 WBCS — SIGNIFICANT CHANGE UP (ref 0–0)
PLATELET # BLD AUTO: 128 K/UL — LOW (ref 150–400)
POTASSIUM SERPL-MCNC: 4.7 MMOL/L — SIGNIFICANT CHANGE UP (ref 3.5–5.3)
POTASSIUM SERPL-SCNC: 4.7 MMOL/L — SIGNIFICANT CHANGE UP (ref 3.5–5.3)
RBC # BLD: 2.62 M/UL — LOW (ref 4.2–5.8)
RBC # FLD: 15 % — HIGH (ref 10.3–14.5)
SODIUM SERPL-SCNC: 137 MMOL/L — SIGNIFICANT CHANGE UP (ref 135–145)
TACROLIMUS SERPL-MCNC: 6.7 NG/ML — SIGNIFICANT CHANGE UP
WBC # BLD: 5.91 K/UL — SIGNIFICANT CHANGE UP (ref 3.8–10.5)
WBC # FLD AUTO: 5.91 K/UL — SIGNIFICANT CHANGE UP (ref 3.8–10.5)

## 2020-05-24 PROCEDURE — 99232 SBSQ HOSP IP/OBS MODERATE 35: CPT

## 2020-05-24 PROCEDURE — 99233 SBSQ HOSP IP/OBS HIGH 50: CPT | Mod: GC

## 2020-05-24 RX ORDER — DILTIAZEM HCL 120 MG
120 CAPSULE, EXT RELEASE 24 HR ORAL DAILY
Refills: 0 | Status: DISCONTINUED | OUTPATIENT
Start: 2020-05-24 | End: 2020-05-30

## 2020-05-24 RX ORDER — CALCIUM ACETATE 667 MG
667 TABLET ORAL
Refills: 0 | Status: DISCONTINUED | OUTPATIENT
Start: 2020-05-24 | End: 2020-05-25

## 2020-05-24 RX ORDER — CHOLECALCIFEROL (VITAMIN D3) 125 MCG
3000 CAPSULE ORAL DAILY
Refills: 0 | Status: DISCONTINUED | OUTPATIENT
Start: 2020-05-24 | End: 2020-05-25

## 2020-05-24 RX ADMIN — Medication 70 MILLIGRAM(S): at 07:58

## 2020-05-24 RX ADMIN — Medication 650 MILLIGRAM(S): at 07:57

## 2020-05-24 RX ADMIN — Medication 650 MILLIGRAM(S): at 20:02

## 2020-05-24 RX ADMIN — MYCOPHENOLATE MOFETIL 500 MILLIGRAM(S): 250 CAPSULE ORAL at 20:02

## 2020-05-24 RX ADMIN — Medication 81 MILLIGRAM(S): at 11:08

## 2020-05-24 RX ADMIN — VALGANCICLOVIR 450 MILLIGRAM(S): 450 TABLET, FILM COATED ORAL at 20:02

## 2020-05-24 RX ADMIN — VALGANCICLOVIR 450 MILLIGRAM(S): 450 TABLET, FILM COATED ORAL at 07:57

## 2020-05-24 RX ADMIN — TACROLIMUS 8 MILLIGRAM(S): 5 CAPSULE ORAL at 20:01

## 2020-05-24 RX ADMIN — Medication 667 MILLIGRAM(S): at 20:04

## 2020-05-24 RX ADMIN — Medication 120 MILLIGRAM(S): at 20:01

## 2020-05-24 RX ADMIN — MAGNESIUM OXIDE 400 MG ORAL TABLET 400 MILLIGRAM(S): 241.3 TABLET ORAL at 07:58

## 2020-05-24 RX ADMIN — Medication 1 TABLET(S): at 07:57

## 2020-05-24 RX ADMIN — MYCOPHENOLATE MOFETIL 500 MILLIGRAM(S): 250 CAPSULE ORAL at 07:58

## 2020-05-24 RX ADMIN — PANTOPRAZOLE SODIUM 40 MILLIGRAM(S): 20 TABLET, DELAYED RELEASE ORAL at 06:32

## 2020-05-24 RX ADMIN — Medication 3000 UNIT(S): at 20:01

## 2020-05-24 RX ADMIN — Medication 1 MILLIGRAM(S): at 07:58

## 2020-05-24 RX ADMIN — TACROLIMUS 8 MILLIGRAM(S): 5 CAPSULE ORAL at 07:57

## 2020-05-24 RX ADMIN — Medication 20 MILLIGRAM(S): at 21:42

## 2020-05-24 NOTE — PROGRESS NOTE ADULT - SUBJECTIVE AND OBJECTIVE BOX
Subjective " Hello I am feeling ok today"    Telemetry:  ST   Vital Signs Last 24 Hrs  T(C): 36.5 (05-24-20 @ 05:26), Max: 36.9 (05-23-20 @ 12:00)  T(F): 97.7 (05-24-20 @ 05:26), Max: 98.5 (05-23-20 @ 12:00)  HR: 98 (05-24-20 @ 05:26) (98 - 109)  BP: 123/77 (05-24-20 @ 05:26) (119/76 - 126/82)  RR: 18 (05-24-20 @ 05:26) (18 - 18)  SpO2: 96% (05-24-20 @ 05:26) (96% - 99%)           05-23 @ 07:01  -  05-24 @ 07:00  --------------------------------------------------------  IN: 1050 mL / OUT: 1250 mL / NET: -200 mL    05-24 @ 07:01  -  05-24 @ 10:52  --------------------------------------------------------  IN: 240 mL / OUT: 0 mL / NET: 240 mL                          7.7    5.91  )-----------( 128      ( 24 May 2020 08:14 )             24.2   05-24    137  |  102  |  32<H>  ----------------------------<  102<H>  4.7   |  23  |  1.24    Ca    9.0      24 May 2020 08:14    TPro  6.9  /  Alb  3.6  /  TBili  2.7<H>  /  DBili  0.5<H>  /  AST  18  /  ALT  18  /  AlkPhos  75  05-23      MEDICATIONS  (STANDING):  aspirin enteric coated 81 milliGRAM(s) Oral daily  folic acid 1 milliGRAM(s) Oral <User Schedule>  magnesium oxide 400 milliGRAM(s) Oral <User Schedule>  mycophenolate mofetil 500 milliGRAM(s) Oral <User Schedule>  pantoprazole    Tablet 40 milliGRAM(s) Oral before breakfast  pravastatin 20 milliGRAM(s) Oral at bedtime  predniSONE   Tablet 70 milliGRAM(s) Oral <User Schedule>  sodium bicarbonate 650 milliGRAM(s) Oral <User Schedule>  tacrolimus 8 milliGRAM(s) Oral <User Schedule>  trimethoprim  160 mG/sulfamethoxazole 800 mG 1 Tablet(s) Oral <User Schedule>  valGANciclovir 450 milliGRAM(s) Oral <User Schedule>    MEDICATIONS  (PRN):  meperidine     Injectable 25 milliGRAM(s) IV Push once PRN Rigors    PHYSICAL EXAM  Neurology: alert and oriented x 3, nonfocal, no gross deficits    CV : S1 S2 RRR  Sternal Wound :  NAZ healed strenum  stable  Lungs: B/l CTA on room air  Abdomen: soft, nontender, nondistended, positive bowel sounds,   :   Voids           Extremities:    Equal strength throughout  B/l LE warm well perfused + DP no calf tenderness                                           Discussed with Cardiothoracic Team at AM rounds.

## 2020-05-24 NOTE — PROGRESS NOTE ADULT - ASSESSMENT
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 Presented today to [facility] for IV Rituxan therapy. But unable to obtain intravenous access despite multiple attempts. Patient was also found to be severely anemic with a hemoglobin 6.1 and was sent in to [facility] ER for PRBC transfusion and to initiate Rituxan therapy. In ER Occult stool negative. COVID-19 PCR negative. Noted a low grade temp(100.4).   Hospital Course:   Admitted to 2 [facility] Telemetry floor. Awaiting Type and Screen. 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 Infectious work up has been negative. Refractory to steroids  Plan to start Rituxan therapy in AM. CHF following  5/23 VSS; guaic neg; 2 units prbc transfused overnight; hemeonc following; Rituxan given today as per hemeonc  tacro level 6.1- continue tacro 8 bid   5/24 VSS 7.7/24.2 < from: CT Chest No Cont (05.22.20 @ 20:29) >  No change in the right upper lobe linear opacity likely due to scarring or the additional bilateral linear opacities likely due to atelectasis or scarring. 2.  Emphysema. 3.  No enlarged chest lymph nodes.

## 2020-05-24 NOTE — PROGRESS NOTE ADULT - PROBLEM SELECTOR PLAN 2
CHF following   Continue with  tacrolimus 8mg q12H;   Continue Cellcept 500 mg BID   Continue Valcyte 450mg daily for CMV prophylaxis.  Continue Bactrim DS daily   Resume ASA 81mg daily once H/H stable. No active bleeding.  Continue MgOx for medication- induced hypomagnesemia.

## 2020-05-24 NOTE — PROGRESS NOTE ADULT - ATTENDING COMMENTS
no events. s/p ritoxan yesterday.   meds: prograf 8/8, cellcept 500 bid, pred 70, valctye 450 bid, bactrim DS QD, PPI  100, 120/                        7.7    5.91  )-----------( 128      ( 24 May 2020 08:14 )             24.2   Hb 7.7, 8.7, 6.1  LDH: 253,   Hapto: 83.   05-24    137  |  102  |  32<H>  ----------------------------<  102<H>  4.7   |  23  |  1.24  Ca    9.0      24 May 2020 08:14    TPro  6.9  /  Alb  3.6  /  TBili  2.7<H>  /  DBili  0.5<H>  /  AST  18  /  ALT  18  /  AlkPhos  75  05-23  prograf: 6.7, 6.1, may 18 - 3.4  Note small drop in Hb. LDH and Hapto not suggestive of active hemolysis, await hem input.   target prograf 8-10, would d/c cellcept when at that level since patient is getting ritoxan and high dose steriods (and has history of nocardia)   Plan:   Dilt  QD.   check Hep C PCR again.   Ca and Vit D while on high dose steriods.   Does patient need a PICC line for ongoing infusions?   Marvin Campbell

## 2020-05-24 NOTE — PROGRESS NOTE ADULT - PROBLEM SELECTOR PLAN 2
RHC and biopsy 2/20 normal hemodynamics and neg for rejection.  Hep C + treated (Hep C PCR 5/4 undetectable)  - Continue tacrolimus 8mg q12H; obtain tacrolimus level before 8am dose tomorrow. He didn't take tacrolimus this morning. (outpatient Tac 3.4  5/18 dose increased to 8mg BID. It's been subtherapeutic as outpatient for unclear reason)  - Continue Cellcept 500 mg BID (Lowered during admission due to high dose steroids. 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 CMV PCR 5/2 <1.70 (ask ID, double check on dosing)  - Continue Bactrim DS daily given history of nocardia infection (also PJP prophylaxis).  - Resume ASA 81mg daily once H/H stable. No active bleeding.  - Continue pravastatin 20mg bedtime.  - Continue MgOx for medication- induced hypomagnesemia.

## 2020-05-24 NOTE — PROGRESS NOTE ADULT - PROBLEM SELECTOR PLAN 1
Hematology / oncology following  Transfuse with 2 units PRBC   check CBC with DIFF, CMP, fractionated bilirubin, LDH, haptoglobin, Jacky, MIGUELINA, RF, ANCA  CT Chest w/o contrast negative for any lymphadenopathy  Continue prednisone 70mg daily  Continue folic acid 1mg daily  Would start Bactrim DS on Mon/Wed/Fri for PCP prophylaxis as patient on steroids   Recommend ID consult - only viral PCR that was detectable from prior admission was CMV  rituximab 5/23 given today as per hemeonc

## 2020-05-24 NOTE — PROGRESS NOTE ADULT - ASSESSMENT
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 COVID-19 PCR negative in ER. Beside low grade temp(100.4) in ER, if he develops fever, we need to obtain culture and empiric antibiotics but no signs or symptoms of infection at this time. He reported mild dyspnea in the past few days with walking around the house sometimes. He is satting well in RA in ER. CT chest per hematology's recommendation doesn't show infiltrates or opacities. He appears well, euvolemic, and nontoxic.

## 2020-05-24 NOTE — PROGRESS NOTE ADULT - PROBLEM SELECTOR PLAN 1
Jakcy positive (IgG) hemolytic anemia + cold autoantibody. Infectious work up has been negative. Refractory to steroids. Plan to start Rituxan.  -Hematology - Recommending PRBC and planning on Rituxan tomorrow; Labs and CT chest without contrast to evaluate for lymphadenopathy; continue folic acid.

## 2020-05-24 NOTE — PROGRESS NOTE ADULT - SUBJECTIVE AND OBJECTIVE BOX
NOTE: All current Cardiology Service and Contact Information can be found at [email or web address], password: [email or web address]  Please feel free to contact me directly via text or call at [phone] with any questions or concerns.     Subjective:    Medications:  aspirin enteric coated 81 milliGRAM(s) Oral daily  folic acid 1 milliGRAM(s) Oral <User Schedule>  magnesium oxide 400 milliGRAM(s) Oral <User Schedule>  meperidine     Injectable 25 milliGRAM(s) IV Push once PRN  mycophenolate mofetil 500 milliGRAM(s) Oral <User Schedule>  pantoprazole    Tablet 40 milliGRAM(s) Oral before breakfast  pravastatin 20 milliGRAM(s) Oral at bedtime  predniSONE   Tablet 70 milliGRAM(s) Oral <User Schedule>  sodium bicarbonate 650 milliGRAM(s) Oral <User Schedule>  tacrolimus 8 milliGRAM(s) Oral <User Schedule>  trimethoprim  160 mG/sulfamethoxazole 800 mG 1 Tablet(s) Oral <User Schedule>  valGANciclovir 450 milliGRAM(s) Oral <User Schedule>    Vitals:  T(C): 36.5 (05-24-20 @ 05:26), Max: 36.9 (05-23-20 @ 12:00)  HR: 98 (05-24-20 @ 05:26) (98 - 109)  BP: 123/77 (05-24-20 @ 05:26) (119/76 - 126/82)  BP(mean): 92 (05-24-20 @ 05:26) (92 - 92)  RR: 18 (05-24-20 @ 05:26) (18 - 18)  SpO2: 96% (05-24-20 @ 05:26) (96% - 99%)      Weight (kg): 77.1 (05-22 @ 13:07), 78 (05-22 @ 10:22)    I&O's Summary    23 May 2020 07:01  -  24 May 2020 07:00  --------------------------------------------------------  IN: 1050 mL / OUT: 1250 mL / NET: -200 mL        Physical Exam:  Appearance: No Acute Distress  HEENT: JVP ___ cm H2O, no HJR  Cardiovascular: RRR, Normal S1 S2, No murmurs/rubs/gallops  Respiratory: Clear to auscultation bilaterally  Gastrointestinal: Soft, Non-tender, non-distended	  Skin: no skin lesions  Neurologic: Non-focal  Extremities: No LE edema, warm and well perfused  Psychiatry: A & O x 3, Mood & affect appropriate      Labs:                        8.7    4.74  )-----------( 118      ( 23 May 2020 07:47 )             26.6     05-23    133<L>  |  99  |  26<H>  ----------------------------<  242<H>  4.1   |  24  |  1.14    Ca    8.4      23 May 2020 07:47    TPro  6.9  /  Alb  3.6  /  TBili  2.7<H>  /  DBili  0.5<H>  /  AST  18  /  ALT  18  /  AlkPhos  75  05-23      PT/INR - ( 23 May 2020 11:15 )   PT: 13.5 sec;   INR: 1.18 ratio         PTT - ( 22 May 2020 13:45 )  PTT:28.3 sec  CARDIAC MARKERS ( 23 May 2020 11:15 )  x     / x     / 38 U/L / x     / x              Lactate Dehydrogenase, Serum: 253 U/L (05-23 @ 11:15)  Lactate Dehydrogenase, Serum: 258 U/L (05-23 @ 07:47)

## 2020-05-25 LAB
ALBUMIN SERPL ELPH-MCNC: 3.6 G/DL — SIGNIFICANT CHANGE UP (ref 3.3–5)
ALP SERPL-CCNC: 67 U/L — SIGNIFICANT CHANGE UP (ref 40–120)
ALT FLD-CCNC: 16 U/L — SIGNIFICANT CHANGE UP (ref 10–45)
ANION GAP SERPL CALC-SCNC: 12 MMOL/L — SIGNIFICANT CHANGE UP (ref 5–17)
AST SERPL-CCNC: 14 U/L — SIGNIFICANT CHANGE UP (ref 10–40)
BILIRUB SERPL-MCNC: 1.2 MG/DL — SIGNIFICANT CHANGE UP (ref 0.2–1.2)
BUN SERPL-MCNC: 31 MG/DL — HIGH (ref 7–23)
CALCIUM SERPL-MCNC: 9.1 MG/DL — SIGNIFICANT CHANGE UP (ref 8.4–10.5)
CHLORIDE SERPL-SCNC: 103 MMOL/L — SIGNIFICANT CHANGE UP (ref 96–108)
CO2 SERPL-SCNC: 21 MMOL/L — LOW (ref 22–31)
CREAT SERPL-MCNC: 1.22 MG/DL — SIGNIFICANT CHANGE UP (ref 0.5–1.3)
GLUCOSE BLDC GLUCOMTR-MCNC: 108 MG/DL — HIGH (ref 70–99)
GLUCOSE SERPL-MCNC: 102 MG/DL — HIGH (ref 70–99)
HCT VFR BLD CALC: 24.3 % — LOW (ref 39–50)
HGB BLD-MCNC: 7.8 G/DL — LOW (ref 13–17)
MCHC RBC-ENTMCNC: 29.5 PG — SIGNIFICANT CHANGE UP (ref 27–34)
MCHC RBC-ENTMCNC: 32.1 GM/DL — SIGNIFICANT CHANGE UP (ref 32–36)
MCV RBC AUTO: 92 FL — SIGNIFICANT CHANGE UP (ref 80–100)
NRBC # BLD: 0 /100 WBCS — SIGNIFICANT CHANGE UP (ref 0–0)
PLATELET # BLD AUTO: 127 K/UL — LOW (ref 150–400)
POTASSIUM SERPL-MCNC: 5 MMOL/L — SIGNIFICANT CHANGE UP (ref 3.5–5.3)
POTASSIUM SERPL-SCNC: 5 MMOL/L — SIGNIFICANT CHANGE UP (ref 3.5–5.3)
PROT SERPL-MCNC: 6.5 G/DL — SIGNIFICANT CHANGE UP (ref 6–8.3)
RBC # BLD: 2.64 M/UL — LOW (ref 4.2–5.8)
RBC # BLD: 2.64 M/UL — LOW (ref 4.2–5.8)
RBC # FLD: 15.2 % — HIGH (ref 10.3–14.5)
RETICS #: 12.4 K/UL — LOW (ref 25–125)
RETICS/RBC NFR: 0.5 % — SIGNIFICANT CHANGE UP (ref 0.5–2.5)
SODIUM SERPL-SCNC: 136 MMOL/L — SIGNIFICANT CHANGE UP (ref 135–145)
TACROLIMUS SERPL-MCNC: 7.5 NG/ML — SIGNIFICANT CHANGE UP
WBC # BLD: 5.14 K/UL — SIGNIFICANT CHANGE UP (ref 3.8–10.5)
WBC # FLD AUTO: 5.14 K/UL — SIGNIFICANT CHANGE UP (ref 3.8–10.5)

## 2020-05-25 PROCEDURE — 99232 SBSQ HOSP IP/OBS MODERATE 35: CPT

## 2020-05-25 PROCEDURE — 99232 SBSQ HOSP IP/OBS MODERATE 35: CPT | Mod: GC

## 2020-05-25 RX ORDER — NYSTATIN 500MM UNIT
500000 POWDER (EA) MISCELLANEOUS
Refills: 0 | Status: DISCONTINUED | OUTPATIENT
Start: 2020-05-25 | End: 2020-05-30

## 2020-05-25 RX ADMIN — MAGNESIUM OXIDE 400 MG ORAL TABLET 400 MILLIGRAM(S): 241.3 TABLET ORAL at 08:02

## 2020-05-25 RX ADMIN — TACROLIMUS 8 MILLIGRAM(S): 5 CAPSULE ORAL at 20:03

## 2020-05-25 RX ADMIN — Medication 667 MILLIGRAM(S): at 08:01

## 2020-05-25 RX ADMIN — Medication 3000 UNIT(S): at 11:12

## 2020-05-25 RX ADMIN — TACROLIMUS 8 MILLIGRAM(S): 5 CAPSULE ORAL at 08:02

## 2020-05-25 RX ADMIN — PANTOPRAZOLE SODIUM 40 MILLIGRAM(S): 20 TABLET, DELAYED RELEASE ORAL at 05:09

## 2020-05-25 RX ADMIN — MYCOPHENOLATE MOFETIL 500 MILLIGRAM(S): 250 CAPSULE ORAL at 08:01

## 2020-05-25 RX ADMIN — Medication 70 MILLIGRAM(S): at 08:01

## 2020-05-25 RX ADMIN — Medication 650 MILLIGRAM(S): at 08:01

## 2020-05-25 RX ADMIN — VALGANCICLOVIR 450 MILLIGRAM(S): 450 TABLET, FILM COATED ORAL at 20:02

## 2020-05-25 RX ADMIN — Medication 81 MILLIGRAM(S): at 11:12

## 2020-05-25 RX ADMIN — VALGANCICLOVIR 450 MILLIGRAM(S): 450 TABLET, FILM COATED ORAL at 08:02

## 2020-05-25 RX ADMIN — Medication 1 TABLET(S): at 08:01

## 2020-05-25 RX ADMIN — Medication 1 MILLIGRAM(S): at 08:02

## 2020-05-25 RX ADMIN — Medication 650 MILLIGRAM(S): at 20:02

## 2020-05-25 RX ADMIN — MYCOPHENOLATE MOFETIL 500 MILLIGRAM(S): 250 CAPSULE ORAL at 20:03

## 2020-05-25 RX ADMIN — Medication 20 MILLIGRAM(S): at 21:01

## 2020-05-25 RX ADMIN — Medication 667 MILLIGRAM(S): at 11:12

## 2020-05-25 RX ADMIN — Medication 500000 UNIT(S): at 17:02

## 2020-05-25 RX ADMIN — Medication 500000 UNIT(S): at 23:52

## 2020-05-25 RX ADMIN — Medication 120 MILLIGRAM(S): at 05:09

## 2020-05-25 NOTE — PROGRESS NOTE ADULT - PROBLEM SELECTOR PLAN 1
Hematology / oncology following  Transfuse with 2 units PRBC   check CBC with DIFF, CMP, fractionated bilirubin, LDH, haptoglobin, Jacky, MIGUELINA, RF, ANCA  CT Chest w/o contrast negative for any lymphadenopathy  Continue prednisone 70mg daily  Continue folic acid 1mg daily  Would start Bactrim DS on Mon/Wed/Fri for PCP prophylaxis as patient on steroids   Recommend ID consult - only viral PCR that was detectable from prior admission was CMV  rituximab 5/23 given today as per hemeonc Hematology / oncology following  Transfused with 2 units PRBC   check CBC with DIFF, CMP, fractionated bilirubin, LDH, haptoglobin, Jacky, MIGUELINA, RF, ANCA  CT Chest w/o contrast negative for any lymphadenopathy  Continue prednisone 70mg daily  Continue folic acid 1mg daily  Would start Bactrim DS on Mon/Wed/Fri for PCP prophylaxis as patient on steroids  - only viral PCR that was detectable from prior admission was CMV  rituximab 5/23 given as per heme/onc

## 2020-05-25 NOTE — PROGRESS NOTE ADULT - SUBJECTIVE AND OBJECTIVE BOX
NOTE: All current Cardiology Service and Contact Information can be found at [email or web address], password: [email or web address]  Please feel free to contact me directly via text or call at [phone] with any questions or concerns.     Subjective:    Medications:  aspirin enteric coated 81 milliGRAM(s) Oral daily  calcium acetate 667 milliGRAM(s) Oral three times a day with meals  cholecalciferol 3000 Unit(s) Oral daily  diltiazem    milliGRAM(s) Oral daily  folic acid 1 milliGRAM(s) Oral <User Schedule>  magnesium oxide 400 milliGRAM(s) Oral <User Schedule>  meperidine     Injectable 25 milliGRAM(s) IV Push once PRN  mycophenolate mofetil 500 milliGRAM(s) Oral <User Schedule>  pantoprazole    Tablet 40 milliGRAM(s) Oral before breakfast  pravastatin 20 milliGRAM(s) Oral at bedtime  predniSONE   Tablet 70 milliGRAM(s) Oral <User Schedule>  sodium bicarbonate 650 milliGRAM(s) Oral <User Schedule>  tacrolimus 8 milliGRAM(s) Oral <User Schedule>  trimethoprim  160 mG/sulfamethoxazole 800 mG 1 Tablet(s) Oral <User Schedule>  valGANciclovir 450 milliGRAM(s) Oral <User Schedule>    Vitals:  T(C): 36.5 (05-25-20 @ 05:08), Max: 36.7 (05-24-20 @ 12:18)  HR: 106 (05-25-20 @ 05:08) (105 - 110)  BP: 135/99 (05-25-20 @ 05:08) (135/74 - 135/99)  BP(mean): 111 (05-25-20 @ 05:08) (94 - 111)  RR: 18 (05-25-20 @ 05:08) (16 - 18)  SpO2: 99% (05-25-20 @ 05:08) (98% - 100%)          I&O's Summary    24 May 2020 07:01  -  25 May 2020 07:00  --------------------------------------------------------  IN: 880 mL / OUT: 1200 mL / NET: -320 mL        Physical Exam:  Appearance: No Acute Distress  HEENT: JVP ___ cm H2O, no HJR  Cardiovascular: RRR, Normal S1 S2, No murmurs/rubs/gallops  Respiratory: Clear to auscultation bilaterally  Gastrointestinal: Soft, Non-tender, non-distended	  Skin: no skin lesions  Neurologic: Non-focal  Extremities: No LE edema, warm and well perfused  Psychiatry: A & O x 3, Mood & affect appropriate      Labs:                        7.7    5.91  )-----------( 128      ( 24 May 2020 08:14 )             24.2     05-24    137  |  102  |  32<H>  ----------------------------<  102<H>  4.7   |  23  |  1.24    Ca    9.0      24 May 2020 08:14    TPro  6.9  /  Alb  3.6  /  TBili  2.7<H>  /  DBili  0.5<H>  /  AST  18  /  ALT  18  /  AlkPhos  75  05-23      PT/INR - ( 23 May 2020 11:15 )   PT: 13.5 sec;   INR: 1.18 ratio           CARDIAC MARKERS ( 23 May 2020 11:15 )  x     / x     / 38 U/L / x     / x              Lactate Dehydrogenase, Serum: 253 U/L (05-23 @ 11:15)  Lactate Dehydrogenase, Serum: 258 U/L (05-23 @ 07:47)

## 2020-05-25 NOTE — PROGRESS NOTE ADULT - ASSESSMENT
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 Presented today to [facility] for IV Rituxan therapy. But unable to obtain intravenous access despite multiple attempts. Patient was also found to be severely anemic with a hemoglobin 6.1 and was sent in to [facility] ER for PRBC transfusion and to initiate Rituxan therapy. In ER Occult stool negative. COVID-19 PCR negative. Noted a low grade temp(100.4).   Hospital Course:   Admitted to 2 [facility] Telemetry floor. Awaiting Type and Screen. 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 Infectious work up has been negative. Refractory to steroids  Plan to start Rituxan therapy in AM. CHF following  5/23 VSS; guaic neg; 2 units prbc transfused overnight; hemeonc following; Rituxan given today as per hemeonc  tacro level 6.1- continue tacro 8 bid   5/24 VSS 7.7/24.2 < from: CT Chest No Cont (05.22.20 @ 20:29) >  No change in the right upper lobe linear opacity likely due to scarring or the additional bilateral linear opacities likely due to atelectasis or scarring. 2.  Emphysema. 3.  No enlarged chest lymph nodes.  5/25 VSS, H/H stable 7.8/24.3 today. 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 Presented today to [facility] for IV Rituxan therapy. But unable to obtain intravenous access despite multiple attempts. Patient was also found to be severely anemic with a hemoglobin 6.1 and was sent in to [facility] ER for PRBC transfusion and to initiate Rituxan therapy. In ER Occult stool negative. COVID-19 PCR negative. Noted a low grade temp(100.4).   Hospital Course:   Admitted to 2 [facility] Telemetry floor. Awaiting Type and Screen. 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 Infectious work up has been negative. Refractory to steroids  Plan to start Rituxan therapy in AM. CHF following  5/23 VSS; guaic neg; 2 units prbc transfused overnight; hemeonc following; Rituxan given today as per hemeonc  tacro level 6.1- continue tacro 8 bid   5/24 VSS 7.7/24.2 < from: CT Chest No Cont (05.22.20 @ 20:29) > No change in the right upper lobe linear opacity likely due to scarring or the additional bilateral linear opacities likely due to atelectasis or scarring. 2.  Emphysema. 3.  No enlarged chest lymph nodes.  5/25 VSS, H/H stable 7.8/24.3 today. 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 Presented today to [facility] for IV Rituxan therapy. But unable to obtain intravenous access despite multiple attempts. Patient was also found to be severely anemic with a hemoglobin 6.1 and was sent in to [facility] ER for PRBC transfusion and to initiate Rituxan therapy. In ER Occult stool negative. COVID-19 PCR negative. Noted a low grade temp(100.4).   Hospital Course:   Admitted to 2 [facility] Telemetry floor. Awaiting Type and Screen. 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 Infectious work up has been negative. Refractory to steroids  Plan to start Rituxan therapy in AM. CHF following  5/23 VSS; guaic neg; 2 units prbc transfused overnight; hemeonc following; Rituxan given today as per hemeonc  tacro level 6.1- continue tacro 8 bid   5/24 VSS 7.7/24.2 < from: CT Chest No Cont (05.22.20 @ 20:29) > No change in the right upper lobe linear opacity likely due to scarring or the additional bilateral linear opacities likely due to atelectasis or scarring. 2.  Emphysema. 3.  No enlarged chest lymph nodes.  5/25 VSS, H/H stable 7.8/24.3 today. Tacrolimus level 7.5

## 2020-05-25 NOTE — PROGRESS NOTE ADULT - SUBJECTIVE AND OBJECTIVE BOX
Subjective: Pt states "Hello" denies any CP or SOB. No acute events overnight.     Telemetry:  SR 1st degree 90 - 120  Vital Signs Last 24 Hrs  T(C): 36.5 (05-25-20 @ 05:08), Max: 36.7 (05-24-20 @ 12:18)  T(F): 97.7 (05-25-20 @ 05:08), Max: 98.1 (05-24-20 @ 12:18)  HR: 106 (05-25-20 @ 05:08) (105 - 110)  BP: 135/99 (05-25-20 @ 05:08) (135/74 - 135/99)  RR: 18 (05-25-20 @ 05:08) (16 - 18)  SpO2: 99% (05-25-20 @ 05:08) (98% - 100%)             05-24 @ 07:01  -  05-25 @ 07:00  --------------------------------------------------------  IN: 880 mL / OUT: 1200 mL / NET: -320 mL                              7.8    5.14  )-----------( 127      ( 25 May 2020 07:45 )             24.3     136  |  103  |  31<H>  ----------------------------<  102<H>  5.0   |  21<L>  |  1.22         PHYSICAL EXAM  Neurology: A&Ox3, NAD  CV : RRR+S1S2  Lungs: Respirations non-labored, B/L BS CTA  Abdomen: Soft, NT/ND, +[id number]  : Voiding without difficulty  Extremities: B/L LE no edema, negative calf tenderness, +PP          MEDICATIONS  aspirin enteric coated 81 milliGRAM(s) Oral daily  calcium acetate 667 milliGRAM(s) Oral three times a day with meals  cholecalciferol 3000 Unit(s) Oral daily  diltiazem    milliGRAM(s) Oral daily  folic acid 1 milliGRAM(s) Oral <User Schedule>  magnesium oxide 400 milliGRAM(s) Oral <User Schedule>  meperidine     Injectable 25 milliGRAM(s) IV Push once PRN  mycophenolate mofetil 500 milliGRAM(s) Oral <User Schedule>  pantoprazole    Tablet 40 milliGRAM(s) Oral before breakfast  pravastatin 20 milliGRAM(s) Oral at bedtime  predniSONE   Tablet 70 milliGRAM(s) Oral <User Schedule>  sodium bicarbonate 650 milliGRAM(s) Oral <User Schedule>  tacrolimus 8 milliGRAM(s) Oral <User Schedule>  trimethoprim  160 mG/sulfamethoxazole 800 mG 1 Tablet(s) Oral <User Schedule>  valGANciclovir 450 milliGRAM(s) Oral <User Schedule>      Discussed with Cardiothoracic Team at AM rounds.

## 2020-05-25 NOTE — PROGRESS NOTE ADULT - ATTENDING COMMENTS
no events. total 2 PRBCs.  meds: prograf 8bid, dilt 120SR, cellcept 500, pred 70, Bactrim DS QD, valcyte 450 bid, PPI.   100, 130/70, Afebrile.   05-25  136  |  103  |  31<H>  ----------------------------<  102<H>  5.0   |  21<L>  |  1.22  Ca    9.1      25 May 2020 07:45  TPro  6.5  /  Alb  3.6  /  TBili  1.2  /  DBili  x   /  AST  14  /  ALT  16  /  AlkPhos  67  05-25  prograf 7.5, 6.7,                         7.8    5.14  )-----------( 127      ( 25 May 2020 07:45 )             24.3   Hb 7.8, 7.7, 8.7, 6.1  EBV undedectable May 5  Issues to consider:  why is hapto and LDH normal in setting of hemolysis.   should patient be readmitted for each ritoxan therapy vs PICC line at home due to challenges with access.  If patient does not have PTLD (no diagnostic features currently). consider target prograf 8-10, then diminishing cellcept while patient is on ritoxan and high dose pred.   ? needs fungal prophylaxsis at that does of prednisone- in the interim will start Nystatin.   will need osteoperosis prophylaxsis and follow up.   Marvin Campbell no events. total 2 PRBCs.  meds: prograf 8bid, dilt 120SR, cellcept 500, pred 70, Bactrim DS QD, valcyte 450 bid, PPI.   100, 130/70, Afebrile.   05-25  136  |  103  |  31<H>  ----------------------------<  102<H>  5.0   |  21<L>  |  1.22  Ca    9.1      25 May 2020 07:45  TPro  6.5  /  Alb  3.6  /  TBili  1.2  /  DBili  x   /  AST  14  /  ALT  16  /  AlkPhos  67  05-25  prograf 7.5, 6.7,                         7.8    5.14  )-----------( 127      ( 25 May 2020 07:45 )             24.3   Hb 7.8, 7.7, 8.7, 6.1  EBV undedectable May 5  Issues to consider:  why is hapto and LDH normal in setting of hemolysis.   should patient be readmitted for each ritoxan therapy vs PICC line at home due to challenges with access.  If patient does not have PTLD (no diagnostic features currently). consider target prograf 8-10, then diminishing cellcept while patient is on ritoxan and high dose pred.   ? needs fungal prophylaxsis at that does of prednisone- in the interim will start Nystatin.   will need osteoperosis prophylaxsis and follow up.   close follow up EMELY Campbell

## 2020-05-25 NOTE — PROGRESS NOTE ADULT - PROBLEM SELECTOR PLAN 1
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 Infectious work up has been negative. Refractory to steroids. Plan to start Rituxan.  -Hematology - Recommending PRBC and planning on Rituxan tomorrow; Labs and CT chest without contrast to evaluate for lymphadenopathy; continue folic acid.

## 2020-05-26 LAB
ANION GAP SERPL CALC-SCNC: 9 MMOL/L — SIGNIFICANT CHANGE UP (ref 5–17)
AUTO DIFF PNL BLD: ABNORMAL
BUN SERPL-MCNC: 31 MG/DL — HIGH (ref 7–23)
C-ANCA SER-ACNC: NEGATIVE — SIGNIFICANT CHANGE UP
CALCIUM SERPL-MCNC: 9.3 MG/DL — SIGNIFICANT CHANGE UP (ref 8.4–10.5)
CHLORIDE SERPL-SCNC: 102 MMOL/L — SIGNIFICANT CHANGE UP (ref 96–108)
CO2 SERPL-SCNC: 24 MMOL/L — SIGNIFICANT CHANGE UP (ref 22–31)
CREAT SERPL-MCNC: 1.32 MG/DL — HIGH (ref 0.5–1.3)
GLUCOSE SERPL-MCNC: 114 MG/DL — HIGH (ref 70–99)
HCT VFR BLD CALC: 24 % — LOW (ref 39–50)
HCV RNA FLD QL NAA+PROBE: SIGNIFICANT CHANGE UP
HCV RNA SPEC QL PROBE+SIG AMP: SIGNIFICANT CHANGE UP
HGB BLD-MCNC: 7.6 G/DL — LOW (ref 13–17)
LDH SERPL L TO P-CCNC: 256 U/L — HIGH (ref 50–242)
MCHC RBC-ENTMCNC: 29.3 PG — SIGNIFICANT CHANGE UP (ref 27–34)
MCHC RBC-ENTMCNC: 31.7 GM/DL — LOW (ref 32–36)
MCV RBC AUTO: 92.7 FL — SIGNIFICANT CHANGE UP (ref 80–100)
NRBC # BLD: 0 /100 WBCS — SIGNIFICANT CHANGE UP (ref 0–0)
P-ANCA SER-ACNC: NEGATIVE — SIGNIFICANT CHANGE UP
PLATELET # BLD AUTO: 170 K/UL — SIGNIFICANT CHANGE UP (ref 150–400)
POTASSIUM SERPL-MCNC: 5.2 MMOL/L — SIGNIFICANT CHANGE UP (ref 3.5–5.3)
POTASSIUM SERPL-SCNC: 5.2 MMOL/L — SIGNIFICANT CHANGE UP (ref 3.5–5.3)
RBC # BLD: 2.59 M/UL — LOW (ref 4.2–5.8)
RBC # FLD: 15.4 % — HIGH (ref 10.3–14.5)
SODIUM SERPL-SCNC: 135 MMOL/L — SIGNIFICANT CHANGE UP (ref 135–145)
TACROLIMUS SERPL-MCNC: 7.9 NG/ML — SIGNIFICANT CHANGE UP
WBC # BLD: 6.35 K/UL — SIGNIFICANT CHANGE UP (ref 3.8–10.5)
WBC # FLD AUTO: 6.35 K/UL — SIGNIFICANT CHANGE UP (ref 3.8–10.5)

## 2020-05-26 PROCEDURE — 99232 SBSQ HOSP IP/OBS MODERATE 35: CPT

## 2020-05-26 PROCEDURE — 99233 SBSQ HOSP IP/OBS HIGH 50: CPT | Mod: GC

## 2020-05-26 RX ORDER — VALGANCICLOVIR 450 MG/1
450 TABLET, FILM COATED ORAL
Refills: 0 | Status: DISCONTINUED | OUTPATIENT
Start: 2020-05-27 | End: 2020-06-02

## 2020-05-26 RX ORDER — SODIUM ZIRCONIUM CYCLOSILICATE 10 G/10G
10 POWDER, FOR SUSPENSION ORAL
Refills: 0 | Status: DISCONTINUED | OUTPATIENT
Start: 2020-05-26 | End: 2020-06-02

## 2020-05-26 RX ADMIN — TACROLIMUS 8 MILLIGRAM(S): 5 CAPSULE ORAL at 08:01

## 2020-05-26 RX ADMIN — PANTOPRAZOLE SODIUM 40 MILLIGRAM(S): 20 TABLET, DELAYED RELEASE ORAL at 05:05

## 2020-05-26 RX ADMIN — MYCOPHENOLATE MOFETIL 500 MILLIGRAM(S): 250 CAPSULE ORAL at 20:18

## 2020-05-26 RX ADMIN — SODIUM ZIRCONIUM CYCLOSILICATE 10 GRAM(S): 10 POWDER, FOR SUSPENSION ORAL at 17:49

## 2020-05-26 RX ADMIN — VALGANCICLOVIR 450 MILLIGRAM(S): 450 TABLET, FILM COATED ORAL at 08:02

## 2020-05-26 RX ADMIN — MAGNESIUM OXIDE 400 MG ORAL TABLET 400 MILLIGRAM(S): 241.3 TABLET ORAL at 07:59

## 2020-05-26 RX ADMIN — Medication 500000 UNIT(S): at 11:50

## 2020-05-26 RX ADMIN — Medication 120 MILLIGRAM(S): at 05:05

## 2020-05-26 RX ADMIN — Medication 500000 UNIT(S): at 05:08

## 2020-05-26 RX ADMIN — TACROLIMUS 8 MILLIGRAM(S): 5 CAPSULE ORAL at 20:19

## 2020-05-26 RX ADMIN — MYCOPHENOLATE MOFETIL 500 MILLIGRAM(S): 250 CAPSULE ORAL at 07:59

## 2020-05-26 RX ADMIN — Medication 1 MILLIGRAM(S): at 07:58

## 2020-05-26 RX ADMIN — Medication 650 MILLIGRAM(S): at 20:18

## 2020-05-26 RX ADMIN — Medication 650 MILLIGRAM(S): at 08:00

## 2020-05-26 RX ADMIN — Medication 20 MILLIGRAM(S): at 23:33

## 2020-05-26 RX ADMIN — Medication 81 MILLIGRAM(S): at 11:49

## 2020-05-26 RX ADMIN — Medication 500000 UNIT(S): at 23:33

## 2020-05-26 RX ADMIN — Medication 2 TABLET(S): at 20:18

## 2020-05-26 RX ADMIN — Medication 500000 UNIT(S): at 17:49

## 2020-05-26 RX ADMIN — Medication 70 MILLIGRAM(S): at 08:00

## 2020-05-26 RX ADMIN — Medication 1 TABLET(S): at 08:01

## 2020-05-26 NOTE — PROGRESS NOTE ADULT - PROBLEM SELECTOR PLAN 1
Hematology / oncology following  Transfused with 2 units PRBC   check CBC with DIFF, CMP, fractionated bilirubin, LDH, haptoglobin, Jacky, MIGUELINA, RF, ANCA  CT Chest w/o contrast negative for any lymphadenopathy  Continue prednisone 70mg daily  Continue folic acid 1mg daily  Would start Bactrim DS on Mon/Wed/Fri for PCP prophylaxis as patient on steroids  - only viral PCR that was detectable from prior admission was CMV  rituximab 5/23 given as per heme/onc Hematology / oncology following  Transfused with 2 units PRBC   check CBC with DIFF, CMP, fractionated bilirubin, LDH, haptoglobin, Jacky, MIGUELINA, RF, ANCA  CT Chest w/o contrast negative for any lymphadenopathy  Continue prednisone 70mg daily  Continue folic acid 1mg daily   Bactrim  Mon/Wed/Fri for PCP prophylaxis as patient on steroids  - only viral PCR that was detectable from prior admission was CMV  rituximab 5/23 given as per heme/onc

## 2020-05-26 NOTE — PROGRESS NOTE ADULT - SUBJECTIVE AND OBJECTIVE BOX
INTERVAL HPI/OVERNIGHT EVENTS:  Patient seen at bedside. Hgb stable at 7.6 today, though LDH and hapto are not suggestive of active hemolysis  VITAL SIGNS:  T(F): 97.9 (05-26-20 @ 11:52)  HR: 110 (05-26-20 @ 11:52)  BP: 126/75 (05-26-20 @ 11:52)  RR: 18 (05-26-20 @ 11:52)  SpO2: 100% (05-26-20 @ 11:52)  Wt(kg): --    PHYSICAL EXAM:    Constitutional: NAD  Eyes: EOMI, sclera non-icteric  Neck: supple, no masses  Respiratory: CTAB  Cardiovascular: RRR, no M/R/G  Gastrointestinal: soft, NTND  Extremities: no c/c/e  Neurological: AAOx3      MEDICATIONS  (STANDING):  aspirin enteric coated 81 milliGRAM(s) Oral daily  calcium citrate  1500 mG + Vitamin D (CITRACAL + D3 Maximum) 2 Tablet(s) Oral <User Schedule>  diltiazem    milliGRAM(s) Oral daily  folic acid 1 milliGRAM(s) Oral <User Schedule>  magnesium oxide 400 milliGRAM(s) Oral <User Schedule>  mycophenolate mofetil 500 milliGRAM(s) Oral <User Schedule>  nystatin    Suspension 974082 Unit(s) Oral four times a day  pantoprazole    Tablet 40 milliGRAM(s) Oral before breakfast  pravastatin 20 milliGRAM(s) Oral at bedtime  predniSONE   Tablet 70 milliGRAM(s) Oral <User Schedule>  sodium bicarbonate 650 milliGRAM(s) Oral <User Schedule>  sodium zirconium cyclosilicate 10 Gram(s) Oral <User Schedule>  tacrolimus 8 milliGRAM(s) Oral <User Schedule>    MEDICATIONS  (PRN):  meperidine     Injectable 25 milliGRAM(s) IV Push once PRN Rigors      Allergies    No Known Allergies    Intolerances        LABS:                        7.6    6.35  )-----------( 170      ( 26 May 2020 08:29 )             24.0     05-26    135  |  102  |  31<H>  ----------------------------<  114<H>  5.2   |  24  |  1.32<H>    Ca    9.3      26 May 2020 08:29    TPro  6.5  /  Alb  3.6  /  TBili  1.2  /  DBili  x   /  AST  14  /  ALT  16  /  AlkPhos  67  05-25          RADIOLOGY & ADDITIONAL TESTS:  Studies reviewed.    ASSESSMENT & PLAN:

## 2020-05-26 NOTE — PROGRESS NOTE ADULT - ASSESSMENT
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 COVID-19 PCR negative in ER. Beside low grade temp(100.4) in ER, if he develops fever, we need to obtain culture and empiric antibiotics but no signs or symptoms of infection at this time. He reported mild dyspnea in the past few days with walking around the house sometimes. He is satting well in RA. CBC w/ pancytopenia w/ low retic count and no e/o of hemolysis. Likely in the setting of valcyte + bactrim. No evidence of PTLD on imaging. He appears well, euvolemic, and nontoxic. 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 COVID-19 PCR negative in ER. Beside low grade temp(100.4) in ER, if he develops fever, we need to obtain culture and empiric antibiotics but no signs or symptoms of infection at this time. He reported mild dyspnea in the past few days with walking around the house sometimes. He is satting well in RA. CBC w/ pancytopenia w/ low retic count and no e/o of hemolysis. Possibly related to valcyte + bactrim. No evidence of PTLD on imaging. He appears well, euvolemic, and nontoxic.

## 2020-05-26 NOTE — PROGRESS NOTE ADULT - SUBJECTIVE AND OBJECTIVE BOX
NOTE: All current Cardiology Service and Contact Information can be found at [email or web address], password: [email or web address]    Subjective: No acute overnight events. Hgb 7.6 today. No complaints this morning.    Medications:  MEDICATIONS  (STANDING):  aspirin enteric coated 81 milliGRAM(s) Oral daily  calcium citrate  1500 mG + Vitamin D (CITRACAL + D3 Maximum) 2 Tablet(s) Oral <User Schedule>  diltiazem    milliGRAM(s) Oral daily  folic acid 1 milliGRAM(s) Oral <User Schedule>  magnesium oxide 400 milliGRAM(s) Oral <User Schedule>  mycophenolate mofetil 500 milliGRAM(s) Oral <User Schedule>  nystatin    Suspension 397077 Unit(s) Oral four times a day  pantoprazole    Tablet 40 milliGRAM(s) Oral before breakfast  pravastatin 20 milliGRAM(s) Oral at bedtime  predniSONE   Tablet 70 milliGRAM(s) Oral <User Schedule>  sodium bicarbonate 650 milliGRAM(s) Oral <User Schedule>  sodium zirconium cyclosilicate 10 Gram(s) Oral <User Schedule>  tacrolimus 8 milliGRAM(s) Oral <User Schedule>    MEDICATIONS  (PRN):  meperidine     Injectable 25 milliGRAM(s) IV Push once PRN Rigors      Vitals:  Vital Signs Last 24 Hrs  T(C): 36.6 (26 May 2020 11:52), Max: 36.6 (26 May 2020 11:52)  T(F): 97.9 (26 May 2020 11:52), Max: 97.9 (26 May 2020 11:52)  HR: 110 (26 May 2020 11:52) (104 - 118)  BP: 126/75 (26 May 2020 11:52) (121/80 - 145/91)  BP(mean): 109 (26 May 2020 05:04) (94 - 109)  RR: 18 (26 May 2020 11:52) (18 - 18)  SpO2: 100% (26 May 2020 11:52) (97% - 100%)      I&O's Summary    25 May 2020 07:01  -  26 May 2020 07:00  --------------------------------------------------------  IN: 960 mL / OUT: 1200 mL / NET: -240 mL    26 May 2020 07:01  -  26 May 2020 17:30  --------------------------------------------------------  IN: 490 mL / OUT: 0 mL / NET: 490 mL    Daily     Daily Weight in k.3 (26 May 2020 07:50)    Physical Exam:  Appearance: No Acute Distress  HEENT: No JVD  Cardiovascular: IV/VI machine-like systolic murmur, regular rate and rhythm   Respiratory: Clear to auscultation bilaterally  Gastrointestinal: Soft, Non-tender, non-distended	  Skin: no skin lesions  Neurologic: Non-focal  Extremities: No LE edema, warm and well perfused  Psychiatry: A & O x 3, Mood & affect appropriate      Labs:                          7.6    6.35  )-----------( 170      ( 26 May 2020 08:29 )             24.0                           7.7    5.91  )-----------( 128      ( 24 May 2020 08:14 )             24.2     05-26    135  |  102  |  31<H>  ----------------------------<  114<H>  5.2   |  24  |  1.32<H>    Ca    9.3      26 May 2020 08:29    TPro  6.5  /  Alb  3.6  /  TBili  1.2  /  DBili  x   /  AST  14  /  ALT  16  /  AlkPhos  67  05    0524    137  |  102  |  32<H>  ----------------------------<  102<H>  4.7   |  23  |  1.24    Ca    9.0      24 May 2020 08:14    TPro  6.9  /  Alb  3.6  /  TBili  2.7<H>  /  DBili  0.5<H>  /  AST  18  /  ALT  18  /  AlkPhos  75  0523        Lactate Dehydrogenase, Serum: 256 U/L (20 @ 08:29)  Haptoglobin, Serum: 83 mg/dL (20 @ 12:21)

## 2020-05-26 NOTE — PROGRESS NOTE ADULT - SUBJECTIVE AND OBJECTIVE BOX
Subjective:    Tele:                              T(C): 36.6 (05-26-20 @ 11:52), Max: 36.6 (05-26-20 @ 11:52)  HR: 110 (05-26-20 @ 11:52) (104 - 118)  BP: 126/75 (05-26-20 @ 11:52) (121/80 - 145/91)  ABP: --  ABP(mean): --  RR: 18 (05-26-20 @ 11:52) (18 - 18)  SpO2: 100% (05-26-20 @ 11:52) (97% - 100%)  Wt(kg): --  CVP(mm Hg): --  CO: --  CI: --  PA: --    LVEF:       05-26    135  |  102  |  31<H>  ----------------------------<  114<H>  5.2   |  24  |  1.32<H>    Ca    9.3      26 May 2020 08:29    TPro  6.5  /  Alb  3.6  /  TBili  1.2  /  DBili  x   /  AST  14  /  ALT  16  /  AlkPhos  67  05-25                               7.6    6.35  )-----------( 170      ( 26 May 2020 08:29 )             24.0            CAPILLARY BLOOD GLUCOSE               CXR:      Assessment    Neuro:     Pulm:     CV:    Abd:     Extremities:       MEDICATIONS  (STANDING):  aspirin enteric coated 81 milliGRAM(s) Oral daily  calcium citrate  1500 mG + Vitamin D (CITRACAL + D3 Maximum) 2 Tablet(s) Oral <User Schedule>  diltiazem    milliGRAM(s) Oral daily  folic acid 1 milliGRAM(s) Oral <User Schedule>  magnesium oxide 400 milliGRAM(s) Oral <User Schedule>  mycophenolate mofetil 500 milliGRAM(s) Oral <User Schedule>  nystatin    Suspension [id number] Unit(s) Oral four times a day  pantoprazole    Tablet 40 milliGRAM(s) Oral before breakfast  pravastatin 20 milliGRAM(s) Oral at bedtime  predniSONE   Tablet 70 milliGRAM(s) Oral <User Schedule>  sodium bicarbonate 650 milliGRAM(s) Oral <User Schedule>  sodium zirconium cyclosilicate 10 Gram(s) Oral <User Schedule>  tacrolimus 8 milliGRAM(s) Oral <User Schedule>       PAST MEDICAL & SURGICAL HISTORY:  H/O autoimmune hemolytic anemia  Knee pain, right  HLD (hyperlipidemia)  Former smoker  DVT of upper extremity (deep vein thrombosis)  Hepatitis C virus  GIB (gastrointestinal bleeding)  Ventricular fibrillation: s/p AICD  PAF (paroxysmal atrial fibrillation): on xarelto  Non-Ischemic Cardiomyopathy  SVT (Supraventricular Tachycardia)  HTN  CHF (Congestive Heart Failure)  S/P right heart catheterization: biopsy multiple  H/O heart transplant: 2/2018  Status post left hip replacement  History of Prior Ablation Treatment: for afib  AICD (Automatic Cardioverter/Defibrillator) Present: [employer] with 1 [employer] lead4/1/09- explanted and replaced with [employer] 2 leads on 9/2/09 Subjective:  "I would like to go home, but I dont think they will let me"  Watching TV    Tele:      SR                           T(C): 36.6 (05-26-20 @ 11:52), Max: 36.6 (05-26-20 @ 11:52)  HR: 110 (05-26-20 @ 11:52) (104 - 118)  BP: 126/75 (05-26-20 @ 11:52) (121/80 - 145/91)  RR: 18 (05-26-20 @ 11:52) (18 - 18)  SpO2: 100% (05-26-20 @ 11:52) (97% - 100%)    LVEF:       05-26    135  |  102  |  31<H>  ----------------------------<  114<H>  5.2   |  24  |  1.32<H>    Ca    9.3      26 May 2020 08:29    TPro  6.5  /  Alb  3.6  /  TBili  1.2  /  DBili  x   /  AST  14  /  ALT  16  /  AlkPhos  67  05-25                               7.6    6.35  )-----------( 170      ( 26 May 2020 08:29 )             24.0                 Assessment    Neurology: A&Ox3, NAD  CV : RRR+S1S2  Lungs: Respirations non-labored, B/L BS CTA  Abdomen: Soft, NT/ND, +[id number]  : Voiding without difficulty  Extremities: B/L LE no edema, negative calf tenderness, +PP       MEDICATIONS  (STANDING):  aspirin enteric coated 81 milliGRAM(s) Oral daily  calcium citrate  1500 mG + Vitamin D (CITRACAL + D3 Maximum) 2 Tablet(s) Oral <User Schedule>  diltiazem    milliGRAM(s) Oral daily  folic acid 1 milliGRAM(s) Oral <User Schedule>  magnesium oxide 400 milliGRAM(s) Oral <User Schedule>  mycophenolate mofetil 500 milliGRAM(s) Oral <User Schedule>  nystatin    Suspension [id number] Unit(s) Oral four times a day  pantoprazole    Tablet 40 milliGRAM(s) Oral before breakfast  pravastatin 20 milliGRAM(s) Oral at bedtime  predniSONE   Tablet 70 milliGRAM(s) Oral <User Schedule>  sodium bicarbonate 650 milliGRAM(s) Oral <User Schedule>  sodium zirconium cyclosilicate 10 Gram(s) Oral <User Schedule>  tacrolimus 8 milliGRAM(s) Oral <User Schedule>       PAST MEDICAL & SURGICAL HISTORY:  H/O autoimmune hemolytic anemia  Knee pain, right  HLD (hyperlipidemia)  Former smoker  DVT of upper extremity (deep vein thrombosis)  Hepatitis C virus  GIB (gastrointestinal bleeding)  Ventricular fibrillation: s/p AICD  PAF (paroxysmal atrial fibrillation): on xarelto  Non-Ischemic Cardiomyopathy  SVT (Supraventricular Tachycardia)  HTN  CHF (Congestive Heart Failure)  S/P right heart catheterization: biopsy multiple  H/O heart transplant: 2/2018  Status post left hip replacement  History of Prior Ablation Treatment: for afib  AICD (Automatic Cardioverter/Defibrillator) Present: [employer] with 1 [employer] lead4/1/09- explanted and replaced with [employer] 2 leads on 9/2/09

## 2020-05-26 NOTE — PROGRESS NOTE ADULT - ATTENDING COMMENTS
NABOR Baez is a 70 year old male with a history of congestive heart failure who had autoimmune hemolytic anemia. Patient is seen today post infusion of rituximab 2 days ago and transfusion of packed red cells. Agree with plan for placement of PICC line and completion of this week's planned rituximab. He may require an addition transfusion of packed red cells prior to discharge as discussed with the patient

## 2020-05-26 NOTE — PROGRESS NOTE ADULT - ASSESSMENT
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 Presented today to [facility] for IV Rituxan therapy. But unable to obtain intravenous access despite multiple attempts. Patient was also found to be severely anemic with a hemoglobin 6.1 and was sent in to [facility] ER for PRBC transfusion and to initiate Rituxan therapy. In ER Occult stool negative. COVID-19 PCR negative. Noted a low grade temp(100.4).   Hospital Course:   Admitted to 2 [facility] Telemetry floor. Awaiting Type and Screen. 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 Infectious work up has been negative. Refractory to steroids  Plan to start Rituxan therapy in AM. CHF following  5/23 VSS; guaic neg; 2 units prbc transfused overnight; hemeonc following; Rituxan given today as per hemeonc  tacro level 6.1- continue tacro 8 bid   5/24 VSS 7.7/24.2 < from: CT Chest No Cont (05.22.20 @ 20:29) > No change in the right upper lobe linear opacity likely due to scarring or the additional bilateral linear opacities likely due to atelectasis or scarring. 2.  Emphysema. 3.  No enlarged chest lymph nodes.  5/25 VSS, H/H stable 7.8/24.3 today. Tacrolimus level 7.5 70y Male presents with Autoimmune hemolytic anemia PMHx includes NICM s/p HM2 s/p OHT on 2/23/18 with coronary fistula, HCV+ s/p Rx, prior antibody mediated rejection s/p IVIG plasmapharesis/Rituximab, CKD (baseline Cr 1.4), with recent admission for hemolytic anemia from 4/29-5/7. Presented today to [facility] for IV Rituxan therapy. But unable to obtain intravenous access despite multiple attempts. Patient was also found to be severely anemic with a hemoglobin 6.1 and was sent in to [facility] ER for PRBC transfusion and to initiate Rituxan therapy. In ER Occult stool negative. COVID-19 PCR negative. Noted a low grade temp(100.4).   Hospital Course:   Admitted to 2 [facility] Telemetry floor. Awaiting Type and Screen. 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 Infectious work up has been negative. Refractory to steroids  Plan to start Rituxan therapy in AM. CHF following  5/23 VSS; guaic neg; 2 units prbc transfused overnight; hemeonc following; Rituxan given today as per hemeonc  tacro level 6.1- continue tacro 8 bid   5/24 VSS 7.7/24.2 < from: CT Chest No Cont (05.22.20 @ 20:29) > No change in the right upper lobe linear opacity likely due to scarring or the additional bilateral linear opacities likely due to atelectasis or scarring. 2.  Emphysema. 3.  No enlarged chest lymph nodes.  5/25 VSS, H/H stable 7.8/24.3 today. Tacrolimus level 7.5  5/26 hemodynamics stable  Tacro Lvl> 7.9   lokelma  daily  for ^ K   Valcyte changed daily

## 2020-05-26 NOTE — PROGRESS NOTE ADULT - PROBLEM SELECTOR PLAN 1
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 Infectious work up has been negative. Refractory to steroids. No w/ pancytopenia.  - No evidence of hemolysis of on labs or PTLD on imaging  - Hematology following - Recommending 4 more cycles of Rituxan (1 more infusion while inpatient).  - ID following - initial infection workup was negative on last admission (PCR negative: EBV, parvovirus, Hep B/C, HIV). F/u blood parasite PCR testing for babesia and tick borne panel.   - May need PICC line prior to discharge given difficulty w/ IV access as OP. Will need to weigh risk of infection given immunosuppressed state.   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 Infectious work up has been negative. Reportedly refractory to steroids however hemolysis labs as outpatient were improving. Now w/ pancytopenia.  - No evidence of hemolysis of on labs or PTLD on imaging  - Hematology following - Recommending 4 more cycles of Rituxan (1 more infusion while inpatient).  - ID following - initial infection workup was negative on last admission (PCR negative: EBV, parvovirus, Hep B/C, HIV). F/u blood parasite PCR testing for babesia and tick borne panel.   - May need PICC line prior to discharge given difficulty w/ IV access as OP. Will need to weigh risk of infection given immunosuppressed state.   - Continue folic acid

## 2020-05-26 NOTE — CONSULT NOTE ADULT - SUBJECTIVE AND OBJECTIVE BOX
ID CONSULTATION-- Gary Lujan [phone]    Patient is a 70y old  Male who presents with a chief complaint of Hemolytic Anemia (26 May 2020 14:11)    HPI:  History of Present Illness:    70y Male w/ NICM s/p HM2 s/p OHT on 2/23/18 with coronary fistula, HCV+ s/p Rx, prior antibody mediated rejection s/p IVIG plasmapharesis/Rituximab, CKD (baseline Cr 1.4), with recent admission for hemolytic anemia from 4/29-5/7. Presented today to [facility] for IV Rituxan therapy. But unable to obtain intravenous line access despite multiple attempts. Patient was also found to be severely anemic with a hemoglobin 6.1 and was sent in to [facility] ER for PRBC transfusion and Rituxan therapy.     Past Medical History  Knee pain, right  HLD (hyperlipidemia)  Former smoker  DVT of upper extremity (deep vein thrombosis)  Hepatitis C virus  GIB (gastrointestinal bleeding)  H/O prior ablation treatment: for Afib  Ventricular fibrillation: s/p AICD  PAF (paroxysmal atrial fibrillation): on xarelto  Non-Ischemic Cardiomyopathy  SVT (Supraventricular Tachycardia)  HTN  CHF (Congestive Heart Failure)    Past Surgical History  S/P right heart catheterization: biopsy multiple  H/O heart transplant: 2/2018  LVAD (left ventricular assist device) present  Status post left hip replacement  Hypertension  Hypertension  History of Prior Ablation Treatment: for afib  AICD (Automatic Cardioverter/Defibrillator) Present: [employer] with 1 [employer] lead4/1/09- explanted and replaced with [employer] 2 leads on 9/2/09 (22 May 2020 22:58)      PAST MEDICAL & SURGICAL HISTORY:  H/O autoimmune hemolytic anemia  Knee pain, right  HLD (hyperlipidemia)  Former smoker  DVT of upper extremity (deep vein thrombosis)  Hepatitis C virus  GIB (gastrointestinal bleeding)  Ventricular fibrillation: s/p AICD  PAF (paroxysmal atrial fibrillation): on xarelto  Non-Ischemic Cardiomyopathy  SVT (Supraventricular Tachycardia)  HTN  CHF (Congestive Heart Failure)  S/P right heart catheterization: biopsy multiple  H/O heart transplant: 2/2018  Status post left hip replacement  History of Prior Ablation Treatment: for afib  AICD (Automatic Cardioverter/Defibrillator) Present: [employer] with 1 [employer] lead4/1/09- explanted and replaced with [employer] 2 leads on 9/2/09      SOCIAL:    FAMILY HISTORY:  No pertinent family history in first degree relatives    REVIEW OF SYSTEMS  General:	Denies any malaise fatigue or chills. Fevers absent    Skin:No rash  	  Ophthalmologic:Denies any visual complaints,discharge redness or photophobia  	  ENMT:No nasal discharge,headache,sinus congestion or throat pain.No dental complaints    Respiratory and Thorax:No cough,sputum or chest pain.Denies shortness of breath  	  Cardiovascular:	No chest pain,palpitaions or dizziness    Gastrointestinal:	NO nausea,abdominal pain or diarrhea.    Genitourinary:	No dysuria,frequency. No flank pain    Musculoskeletal:	No joint swelling or pain.No weakness    Neurological:No confusion,diziness.No extremity weakness.No bladder or bowel incontinence	    Psychiatric:No delusions or hallucinations	    Hematology/Lymphatics:	No LN swelling.No gum bleeding     Endocrine:	No recent weight gain or loss.No abnormal heat/cold intolerance    Allergic/Immunologic:	No hives or rash   Allergies    No Known Allergies    Intolerances        ANTIMICROBIALS:    nystatin    Suspension 464036 Unit(s) Oral four times a day      Vital Signs Last 24 Hrs  T(C): 36.6 (26 May 2020 11:52), Max: 36.6 (26 May 2020 11:52)  T(F): 97.9 (26 May 2020 11:52), Max: 97.9 (26 May 2020 11:52)  HR: 110 (26 May 2020 11:52) (104 - 118)  BP: 126/75 (26 May 2020 11:52) (121/80 - 145/91)  BP(mean): 109 (26 May 2020 05:04) (94 - 109)  RR: 18 (26 May 2020 11:52) (18 - 18)  SpO2: 100% (26 May 2020 11:52) (97% - 100%)    PHYSICAL EXAM:Pleasant patient in no acute distress.      Constitutional:Comfortable.Awake and alert  No cachexia     Eyes:PERRL EOMI.NO discharge or conjunctival injection, anicteric    ENMT:No sinus tenderness.No thrush.No pharyngeal exudate or erythema. edentulous    Neck:Supple,No LN,no JVD      Respiratory:Good air entry bilaterally,CTA    Cardiovascular:S1 S2 wnl, No murmurs,rub or gallops    Gastrointestinal:Soft BS(+) no tenderness no masses ,No rebound or guarding    Genitourinary:No CVA tendereness     Rectal:    Extremities:No cyanosis,clubbing or edema. arthritis    Vascular:peripheral pulses felt    Neurological:AAO X 3,No grossly focal deficits    Skin:No rash     Lymph Nodes:No palpable LNs    Musculoskeletal:No joint swelling or LOM    Psychiatric:Affect normal.                              7.6    6.35  )-----------( 170      ( 26 May 2020 08:29 )             24.0       05-26    135  |  102  |  31<H>  ----------------------------<  114<H>  5.2   |  24  |  1.32<H>    Ca    9.3      26 May 2020 08:29    TPro  6.5  /  Alb  3.6  /  TBili  1.2  /  DBili  x   /  AST  14  /  ALT  16  /  AlkPhos  67  05-25      RECENT CULTURES:  Hepatitis C RNA, Qualitative: NotDetec (05.25.20 @ 10:49)    COVID-19 PCR: NotDetec: This test has been validated by [email or web address] to be accurate;  though it has not been FDA cleared/approved by the usual pathway  As with all laboratory test, results should be correlated with clinical  findings.  https://www.fda.gov/media/446907/download  https://www.fda.gov/media/182814/download (05.23.20 @ 11:15)    Urinalysis + Microscopic Examination (05.22.20 @ 19:56)    Color: Yellow    Bilirubin: Negative    Blood, Urine: Negative    Protein, Urine: 30 mg/dL    Leukocyte Esterase Concentration: Negative    pH Urine: 6.5    Nitrite: Negative    Ketone - Urine: Negative    Glucose Qualitative, Urine: Negative    Urine Appearance: Clear    Specific Gravity: 1.022    Urobilinogen: Negative    Red Blood Cell - Urine: 1 /hpf    White Blood Cell - Urine: 2 /HPF    Epithelial Cells: 2 /hpf    Hyaline Casts: 1 /lpf    Bacteria: Negative        RADIOLOGY:    < from: CT Chest No Cont (05.22.20 @ 20:29) >  IMPRESSION:     1.  No change in the right upper lobe linear opacity likely due to scarring or the additional bilateral linear opacities likely due to atelectasis or scarring.  2.  Emphysema.  3.  No enlarged chest lymph nodes.    < end of copied text >    IMPRESSION:    Rx:

## 2020-05-26 NOTE — PROGRESS NOTE ADULT - PROBLEM SELECTOR PLAN 2
RHC and biopsy 2/20 normal hemodynamics and neg for rejection.  Hep C + treated (Hep C PCR 5/4 undetectable)  - Continue tacrolimus 8mg q12H; obtain tacrolimus level before 8am dose tomorrow (outpatient Tac 3.4  5/18 dose increased to 8mg BID. It's been subtherapeutic as outpatient for unclear reason)  - D/c cellcept   - Continue Valganciclovir 450mg daily for CMV prophylaxis. CMV PCR 5/2 <1.70. Recheck CMV viral load per ID  - Change Bactrim to from DS to SS daily given concern for BM suppression  - Voriconazole of fungal ppx. D/u fungitell  - Continue ASA 81mg daily. No active bleeding.  - Continue pravastatin 20mg bedtime  - Continue MgOx for medication- induced hypomagnesemia RHC and biopsy 2/20 normal hemodynamics and neg for rejection.  Hep C + treated (Hep C PCR 5/4 undetectable)  - Continue tacrolimus 8mg q12H; obtain tacrolimus level before 8am dose tomorrow (outpatient Tac 3.4  5/18 dose increased to 8mg BID. It's been subtherapeutic as outpatient for unclear reason)  - will consider d/c cellcept as he will be on Rituxan, high dose prednisone and tacro  - Continue Valganciclovir 450mg daily for CMV prophylaxis. CMV PCR 5/2 <1.70. Recheck CMV viral load per ID  - Change Bactrim to from DS to SS daily given concern for BM suppression  - Voriconazole of fungal ppx. D/u fungitell  - Continue ASA 81mg daily. No active bleeding.  - Continue pravastatin 20mg bedtime  - Continue MgOx for medication- induced hypomagnesemia

## 2020-05-26 NOTE — PROGRESS NOTE ADULT - ASSESSMENT
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:  - underlying etiology unknown - infectious workup was negative on last admission (PCR negative: EBV, parvovirus, Hep B/C, HIV)  - peripheral flow cytometry 5/2: negative  - possibly related to medications (tacrolimus), but generally seen within 1 year of starting medication  - proving to be steroid refractory: has been on prednisone 70-75mg since 5/4 with continued decrease in hemoglobin  - no evidence of lymphadenopathy or malignancy on imaging  - continue prednisone 70mg daily  - continue folic acid 1mg daily  - continue Bactrim DS  on Mon/Wed/Fri for PCP prophylaxis as patient on steroids   - s/p C1 rituxan 5/23, next due 5/28  - LDH minimally elevated today, retic count low and hapto normal arguing against active hemolysis. Low retic raises concern for bone marrow suppression perhaps 2/2 cellcept? will continue to monitor    Coral Ni  Hematology-Oncology PGY-5  [phone]

## 2020-05-26 NOTE — CONSULT NOTE ADULT - ASSESSMENT
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:  - underlying etiology unknown - initial infection workup was negative on last admission (PCR negative: EBV, parvovirus, Hep B/C, HIV)  - will consider other infectious etiologies tick borne illnesses Babesia, ehrlichia/Lyme (not usually a cause of anemia)- will send for blood parasite PCR testing for babesia and tick borne panel for the others  Can also repeat EBV PCR, Parvovirus PCR if above studies are negative    # OI prophylaxis    Given the high dose steroids 70mg/day prednisone- He needs Bactrim for PJP prevention  Valganciclovir for CMV prevention- repeat CMV viral laod  Antifungal prophylaxis added with Voriconazole- send fungitell    will follow    Gary Lujan MD  [phone]  After 5pm/weekends [phone]

## 2020-05-27 ENCOUNTER — RESULT REVIEW (OUTPATIENT)
Age: 70
End: 2020-05-27

## 2020-05-27 LAB
ALBUMIN SERPL ELPH-MCNC: 3.6 G/DL — SIGNIFICANT CHANGE UP (ref 3.3–5)
ALP SERPL-CCNC: 78 U/L — SIGNIFICANT CHANGE UP (ref 40–120)
ALT FLD-CCNC: 16 U/L — SIGNIFICANT CHANGE UP (ref 10–45)
ANA TITR SER: NEGATIVE — SIGNIFICANT CHANGE UP
ANION GAP SERPL CALC-SCNC: 9 MMOL/L — SIGNIFICANT CHANGE UP (ref 5–17)
AST SERPL-CCNC: 12 U/L — SIGNIFICANT CHANGE UP (ref 10–40)
BILIRUB SERPL-MCNC: 1 MG/DL — SIGNIFICANT CHANGE UP (ref 0.2–1.2)
BUN SERPL-MCNC: 33 MG/DL — HIGH (ref 7–23)
CALCIUM SERPL-MCNC: 9.2 MG/DL — SIGNIFICANT CHANGE UP (ref 8.4–10.5)
CHLORIDE SERPL-SCNC: 102 MMOL/L — SIGNIFICANT CHANGE UP (ref 96–108)
CO2 SERPL-SCNC: 23 MMOL/L — SIGNIFICANT CHANGE UP (ref 22–31)
CREAT SERPL-MCNC: 1.34 MG/DL — HIGH (ref 0.5–1.3)
CULTURE RESULTS: SIGNIFICANT CHANGE UP
GLUCOSE SERPL-MCNC: 121 MG/DL — HIGH (ref 70–99)
HAPTOGLOB SERPL-MCNC: 88 MG/DL — SIGNIFICANT CHANGE UP (ref 34–200)
HCT VFR BLD CALC: 23.1 % — LOW (ref 39–50)
HGB BLD-MCNC: 7.4 G/DL — LOW (ref 13–17)
LDH SERPL L TO P-CCNC: 237 U/L — SIGNIFICANT CHANGE UP (ref 50–242)
MCHC RBC-ENTMCNC: 29.6 PG — SIGNIFICANT CHANGE UP (ref 27–34)
MCHC RBC-ENTMCNC: 32 GM/DL — SIGNIFICANT CHANGE UP (ref 32–36)
MCV RBC AUTO: 92.4 FL — SIGNIFICANT CHANGE UP (ref 80–100)
NRBC # BLD: 0 /100 WBCS — SIGNIFICANT CHANGE UP (ref 0–0)
PLATELET # BLD AUTO: 200 K/UL — SIGNIFICANT CHANGE UP (ref 150–400)
POTASSIUM SERPL-MCNC: 5.3 MMOL/L — SIGNIFICANT CHANGE UP (ref 3.5–5.3)
POTASSIUM SERPL-SCNC: 5.3 MMOL/L — SIGNIFICANT CHANGE UP (ref 3.5–5.3)
PROT SERPL-MCNC: 6.3 G/DL — SIGNIFICANT CHANGE UP (ref 6–8.3)
RBC # BLD: 2.5 M/UL — LOW (ref 4.2–5.8)
RBC # BLD: 2.5 M/UL — LOW (ref 4.2–5.8)
RBC # FLD: 15.3 % — HIGH (ref 10.3–14.5)
RETICS #: 24 K/UL — LOW (ref 25–125)
RETICS/RBC NFR: 1 % — SIGNIFICANT CHANGE UP (ref 0.5–2.5)
SODIUM SERPL-SCNC: 134 MMOL/L — LOW (ref 135–145)
SPECIMEN SOURCE: SIGNIFICANT CHANGE UP
TACROLIMUS SERPL-MCNC: 7.9 NG/ML — SIGNIFICANT CHANGE UP
WBC # BLD: 6.07 K/UL — SIGNIFICANT CHANGE UP (ref 3.8–10.5)
WBC # FLD AUTO: 6.07 K/UL — SIGNIFICANT CHANGE UP (ref 3.8–10.5)

## 2020-05-27 PROCEDURE — 88341 IMHCHEM/IMCYTCHM EA ADD ANTB: CPT | Mod: 26,59

## 2020-05-27 PROCEDURE — 88189 FLOWCYTOMETRY/READ 16 & >: CPT

## 2020-05-27 PROCEDURE — 88312 SPECIAL STAINS GROUP 1: CPT | Mod: 26

## 2020-05-27 PROCEDURE — ZZZZZ: CPT | Mod: NC

## 2020-05-27 PROCEDURE — 88305 TISSUE EXAM BY PATHOLOGIST: CPT | Mod: 26

## 2020-05-27 PROCEDURE — 99233 SBSQ HOSP IP/OBS HIGH 50: CPT | Mod: GC

## 2020-05-27 PROCEDURE — 88342 IMHCHEM/IMCYTCHM 1ST ANTB: CPT | Mod: 26,59

## 2020-05-27 PROCEDURE — 99232 SBSQ HOSP IP/OBS MODERATE 35: CPT

## 2020-05-27 PROCEDURE — 85097 BONE MARROW INTERPRETATION: CPT

## 2020-05-27 PROCEDURE — 88313 SPECIAL STAINS GROUP 2: CPT | Mod: 26

## 2020-05-27 PROCEDURE — 88360 TUMOR IMMUNOHISTOCHEM/MANUAL: CPT | Mod: 26

## 2020-05-27 RX ORDER — HEPARIN SODIUM 5000 [USP'U]/ML
5000 INJECTION INTRAVENOUS; SUBCUTANEOUS ONCE
Refills: 0 | Status: COMPLETED | OUTPATIENT
Start: 2020-05-27 | End: 2020-05-27

## 2020-05-27 RX ORDER — LIDOCAINE HCL 20 MG/ML
20 VIAL (ML) INJECTION ONCE
Refills: 0 | Status: COMPLETED | OUTPATIENT
Start: 2020-05-27 | End: 2020-05-27

## 2020-05-27 RX ADMIN — Medication 500000 UNIT(S): at 12:11

## 2020-05-27 RX ADMIN — Medication 1 MILLIGRAM(S): at 08:14

## 2020-05-27 RX ADMIN — Medication 81 MILLIGRAM(S): at 08:14

## 2020-05-27 RX ADMIN — HEPARIN SODIUM 5000 UNIT(S): 5000 INJECTION INTRAVENOUS; SUBCUTANEOUS at 12:11

## 2020-05-27 RX ADMIN — Medication 20 MILLIGRAM(S): at 23:26

## 2020-05-27 RX ADMIN — TACROLIMUS 8 MILLIGRAM(S): 5 CAPSULE ORAL at 20:27

## 2020-05-27 RX ADMIN — TACROLIMUS 8 MILLIGRAM(S): 5 CAPSULE ORAL at 08:13

## 2020-05-27 RX ADMIN — MYCOPHENOLATE MOFETIL 500 MILLIGRAM(S): 250 CAPSULE ORAL at 19:42

## 2020-05-27 RX ADMIN — Medication 650 MILLIGRAM(S): at 19:40

## 2020-05-27 RX ADMIN — Medication 500000 UNIT(S): at 17:17

## 2020-05-27 RX ADMIN — Medication 20 MILLILITER(S): at 12:11

## 2020-05-27 RX ADMIN — Medication 1 TABLET(S): at 08:13

## 2020-05-27 RX ADMIN — Medication 70 MILLIGRAM(S): at 08:13

## 2020-05-27 RX ADMIN — Medication 120 MILLIGRAM(S): at 05:37

## 2020-05-27 RX ADMIN — Medication 2 TABLET(S): at 08:13

## 2020-05-27 RX ADMIN — MYCOPHENOLATE MOFETIL 500 MILLIGRAM(S): 250 CAPSULE ORAL at 08:13

## 2020-05-27 RX ADMIN — Medication 500000 UNIT(S): at 05:37

## 2020-05-27 RX ADMIN — Medication 650 MILLIGRAM(S): at 08:14

## 2020-05-27 RX ADMIN — Medication 2 TABLET(S): at 19:42

## 2020-05-27 RX ADMIN — SODIUM ZIRCONIUM CYCLOSILICATE 10 GRAM(S): 10 POWDER, FOR SUSPENSION ORAL at 17:17

## 2020-05-27 RX ADMIN — PANTOPRAZOLE SODIUM 40 MILLIGRAM(S): 20 TABLET, DELAYED RELEASE ORAL at 05:37

## 2020-05-27 RX ADMIN — VALGANCICLOVIR 450 MILLIGRAM(S): 450 TABLET, FILM COATED ORAL at 08:13

## 2020-05-27 RX ADMIN — MAGNESIUM OXIDE 400 MG ORAL TABLET 400 MILLIGRAM(S): 241.3 TABLET ORAL at 08:13

## 2020-05-27 RX ADMIN — Medication 500000 UNIT(S): at 23:26

## 2020-05-27 NOTE — PROGRESS NOTE ADULT - SUBJECTIVE AND OBJECTIVE BOX
****Preliminary Note***    NOTE: All current Cardiology Service and Contact Information can be found at [email or web address], password: [email or web address]    Subjective: No acute overnight events.     Medications:  aspirin enteric coated 81 milliGRAM(s) Oral daily  calcium citrate  1500 mG + Vitamin D (CITRACAL + D3 Maximum) 2 Tablet(s) Oral <User Schedule>  diltiazem    milliGRAM(s) Oral daily  folic acid 1 milliGRAM(s) Oral <User Schedule>  magnesium oxide 400 milliGRAM(s) Oral <User Schedule>  mycophenolate mofetil 500 milliGRAM(s) Oral <User Schedule>  nystatin    Suspension [id number] Unit(s) Oral four times a day  pantoprazole    Tablet 40 milliGRAM(s) Oral before breakfast  pravastatin 20 milliGRAM(s) Oral at bedtime  predniSONE   Tablet 70 milliGRAM(s) Oral <User Schedule>  sodium bicarbonate 650 milliGRAM(s) Oral <User Schedule>  sodium zirconium cyclosilicate 10 Gram(s) Oral <User Schedule>  tacrolimus 8 milliGRAM(s) Oral <User Schedule>  trimethoprim   80 mG/sulfamethoxazole 400 mG 1 Tablet(s) Oral <User Schedule>  valGANciclovir 450 milliGRAM(s) Oral <User Schedule>    MEDICATIONS  (PRN):  meperidine     Injectable 25 milliGRAM(s) IV Push once PRN Rigors    Vitals:  Vital Signs Last 24 Hrs  T(C): 36.6 (27 May 2020 05:55), Max: 36.6 (26 May 2020 11:52)  T(F): 97.9 (27 May 2020 05:55), Max: 97.9 (26 May 2020 11:52)  HR: 102 (27 May 2020 05:55) (102 - 112)  BP: 127/86 (27 May 2020 05:55) (126/75 - 136/76)  BP(mean): --  RR: 18 (27 May 2020 05:55) (18 - 18)  SpO2: 98% (27 May 2020 05:55) (98% - 100%)    I&O's Summary    26 May 2020 07:01  -  27 May 2020 07:00  --------------------------------------------------------  IN: 730 mL / OUT: 1150 mL / NET: -420 mL      Daily Weight in k.3 (26 May 2020 07:50)    Physical Exam:  Appearance: No Acute Distress  HEENT: No JVD  Cardiovascular: IV/VI machine-like systolic murmur, regular rate and rhythm   Respiratory: Clear to auscultation bilaterally  Gastrointestinal: Soft, Non-tender, non-distended	  Skin: no skin lesions  Neurologic: Non-focal  Extremities: No LE edema, warm and well perfused  Psychiatry: A & O x 3, Mood & affect appropriate      Labs:                 7.6    6.35  )-----------( 170      ( 26 May 2020 08:29 )             24.0                           7.7    5.91  )-----------( 128      ( 24 May 2020 08:14 )             24.2     05-26    135  |  102  |  31<H>  ----------------------------<  114<H>  5.2   |  24  |  1.32<H>    Ca    9.3      26 May 2020 08:29    TPro  6.5  /  Alb  3.6  /  TBili  1.2  /  DBili  x   /  AST  14  /  ALT  16  /  AlkPhos  67  24    137  |  102  |  32<H>  ----------------------------<  102<H>  4.7   |  23  |  1.24    Ca    9.0      24 May 2020 08:14    TPro  6.9  /  Alb  3.6  /  TBili  2.7<H>  /  DBili  0.5<H>  /  AST  18  /  ALT  18  /  AlkPhos  75  0523        Lactate Dehydrogenase, Serum: 256 U/L (20 @ 08:29)  Haptoglobin, Serum: 83 mg/dL (20 @ 12:21) NOTE: All current Cardiology Service and Contact Information can be found at [email or web address], password: [email or web address]    Subjective: No acute overnight events. Hgb and WBC continue to slowly downtrend. Denies any symptoms. Awaiting BM biopsy.     Medications:  MEDICATIONS  (STANDING):  aspirin enteric coated 81 milliGRAM(s) Oral daily  calcium citrate  1500 mG + Vitamin D (CITRACAL + D3 Maximum) 2 Tablet(s) Oral <User Schedule>  diltiazem    milliGRAM(s) Oral daily  folic acid 1 milliGRAM(s) Oral <User Schedule>  magnesium oxide 400 milliGRAM(s) Oral <User Schedule>  mycophenolate mofetil 500 milliGRAM(s) Oral <User Schedule>  nystatin    Suspension [id number] Unit(s) Oral four times a day  pantoprazole    Tablet 40 milliGRAM(s) Oral before breakfast  pravastatin 20 milliGRAM(s) Oral at bedtime  predniSONE   Tablet 70 milliGRAM(s) Oral <User Schedule>  sodium bicarbonate 650 milliGRAM(s) Oral <User Schedule>  sodium zirconium cyclosilicate 10 Gram(s) Oral <User Schedule>  tacrolimus 8 milliGRAM(s) Oral <User Schedule>  trimethoprim   80 mG/sulfamethoxazole 400 mG 1 Tablet(s) Oral <User Schedule>  valGANciclovir 450 milliGRAM(s) Oral <User Schedule>    MEDICATIONS  (PRN):  meperidine     Injectable 25 milliGRAM(s) IV Push once PRN Rigors    Vitals:  Vital Signs Last 24 Hrs  T(C): 36.6 (27 May 2020 05:55), Max: 36.6 (27 May 2020 05:55)  T(F): 97.9 (27 May 2020 05:55), Max: 97.9 (27 May 2020 05:55)  HR: 102 (27 May 2020 05:55) (102 - 112)  BP: 127/86 (27 May 2020 05:55) (127/86 - 136/76)  BP(mean): --  RR: 18 (27 May 2020 05:55) (18 - 18)  SpO2: 98% (27 May 2020 05:55) (98% - 98%)    I&O's Summary    26 May 2020 07:01  -  27 May 2020 07:00  --------------------------------------------------------  IN: 730 mL / OUT: 1150 mL / NET: -420 mL    27 May 2020 07:01  -  27 May 2020 14:17  --------------------------------------------------------  IN: 480 mL / OUT: 400 mL / NET: 80 mL      Daily Weight in k.3 (26 May 2020 07:50)    Physical Exam:  Appearance: No Acute Distress  HEENT: No JVD  Cardiovascular: IV/VI machine-like systolic murmur, regular rate and rhythm   Respiratory: Clear to auscultation bilaterally  Gastrointestinal: Soft, Non-tender, non-distended	  Skin: no skin lesions  Neurologic: Non-focal  Extremities: No LE edema, warm and well perfused  Psychiatry: A & O x 3, Mood & affect appropriate      Labs:                 7.6    6.35  )-----------( 170      ( 26 May 2020 08:29 )             24.0                           7.7    5.91  )-----------( 128      ( 24 May 2020 08:14 )             24.2     05-26    135  |  102  |  31<H>  ----------------------------<  114<H>  5.2   |  24  |  1.32<H>    Ca    9.3      26 May 2020 08:29    TPro  6.5  /  Alb  3.6  /  TBili  1.2  /  DBili  x   /  AST  14  /  ALT  16  /  AlkPhos  67  05-    05-24    137  |  102  |  32<H>  ----------------------------<  102<H>  4.7   |  23  |  1.24    Ca    9.0      24 May 2020 08:14    TPro  6.9  /  Alb  3.6  /  TBili  2.7<H>  /  DBili  0.5<H>  /  AST  18  /  ALT  18  /  AlkPhos  75  05-23      Reticulocyte Count (20 @ 07:15)    RBC Count: 2.50 M/uL    Reticulocyte Percent: 1.0 %    Absolute Reticulocytes: 24.0 K/uL  Lactate Dehydrogenase, Serum: 256 U/L (20 @ 08:29)  Haptoglobin, Serum: 83 mg/dL (20 @ 12:21)

## 2020-05-27 NOTE — PROGRESS NOTE ADULT - PROBLEM SELECTOR PLAN 3
Baseline 1.4  - At baseline. Continue sodium bicarb  - Continue to monitor    ****Preliminary note. Full note to follow after discussion w/ attending**** Baseline 1.4  - At baseline.   - Continue sodium bicarb  - Continue Lokelma  - Continue to monitor

## 2020-05-27 NOTE — PROCEDURE NOTE - SUPERVISORY STATEMENT
No pain or bleeding noted post procedure. Reasons for procedure discussed in chart note . Patient has had Jacky Positive hemolysis although less hemolysis noted in past day. Bone marrow will assess for any marrow suppression induced by medication

## 2020-05-27 NOTE — PROGRESS NOTE ADULT - PROBLEM SELECTOR PLAN 1
Hematology / oncology following  Transfused with 2 units PRBC   check CBC with DIFF, CMP, fractionated bilirubin, LDH, haptoglobin, Jacky, MIGUELINA, RF, ANCA  CT Chest w/o contrast negative for any lymphadenopathy  Continue prednisone 70mg daily  Continue folic acid 1mg daily   Bactrim  Mon/Wed/Fri for PCP prophylaxis as patient on steroids  - only viral PCR that was detectable from prior admission was CMV  rituximab 5/23 given as per heme/onc Hematology / oncology following  Transfused with 2 units PRBC 5/22  check CBC with DIFF, CMP, fractionated bilirubin, LDH, haptoglobin, Jacky, MIGUELINA, RF, ANCA  CT Chest w/o contrast negative for any lymphadenopathy  Continue prednisone 70mg daily  Continue folic acid 1mg daily  Bactrim  Mon/Wed/Fri for PCP prophylaxis as patient on steroids  - only viral PCR that was detectable from prior admission was CMV  rituximab 5/23 given as per heme/onc- next dose 5/29 as per hemeonc

## 2020-05-27 NOTE — PROGRESS NOTE ADULT - SUBJECTIVE AND OBJECTIVE BOX
INFECTIOUS DISEASES FOLLOW UP-- Sujey Lujan  [phone]    This is a follow up note for this  70yMale with  Other autoimmune hemolytic anemias, feels well, underwent bone marrow biopsy      ROS:  CONSTITUTIONAL:  No fever, good appetite  CARDIOVASCULAR:  No chest pain or palpitations  RESPIRATORY:  No dyspnea  GASTROINTESTINAL:  No nausea, vomiting, diarrhea, or abdominal pain  GENITOURINARY:  No dysuria  NEUROLOGIC:  No headache,     Allergies    No Known Allergies    Intolerances        ANTIBIOTICS/RELEVANT:  antimicrobials  nystatin    Suspension [id number] Unit(s) Oral four times a day  trimethoprim   80 mG/sulfamethoxazole 400 mG 1 Tablet(s) Oral <User Schedule>  valGANciclovir 450 milliGRAM(s) Oral <User Schedule>    immunologic:  mycophenolate mofetil 500 milliGRAM(s) Oral <User Schedule>  tacrolimus 8 milliGRAM(s) Oral <User Schedule>    OTHER:  aspirin enteric coated 81 milliGRAM(s) Oral daily  calcium citrate  1500 mG + Vitamin D (CITRACAL + D3 Maximum) 2 Tablet(s) Oral <User Schedule>  diltiazem    milliGRAM(s) Oral daily  folic acid 1 milliGRAM(s) Oral <User Schedule>  magnesium oxide 400 milliGRAM(s) Oral <User Schedule>  meperidine     Injectable 25 milliGRAM(s) IV Push once PRN  pantoprazole    Tablet 40 milliGRAM(s) Oral before breakfast  pravastatin 20 milliGRAM(s) Oral at bedtime  predniSONE   Tablet 70 milliGRAM(s) Oral <User Schedule>  sodium bicarbonate 650 milliGRAM(s) Oral <User Schedule>  sodium zirconium cyclosilicate 10 Gram(s) Oral <User Schedule>      Objective:  Vital Signs Last 24 Hrs  T(C): 36.5 (27 May 2020 14:30), Max: 36.6 (27 May 2020 05:55)  T(F): 97.7 (27 May 2020 14:30), Max: 97.9 (27 May 2020 05:55)  HR: 112 (27 May 2020 14:30) (102 - 112)  BP: 128/54 (27 May 2020 14:30) (127/86 - 136/76)  BP(mean): --  RR: 18 (27 May 2020 14:30) (18 - 18)  SpO2: 100% (27 May 2020 14:30) (98% - 100%)    PHYSICAL EXAM:  Constitutional:no acute distress  Eyes:WALT, EOMI  Ear/Nose/Throat: no oral lesions, 	  Respiratory: clear BL  Cardiovascular: S1S2 sternotomy well healed  Gastrointestinal:soft, (+) BS, no tenderness  Extremities:no e/e/c s/p bone marrow biopsy  No Lymphadenopathy  IV sites not inflammed.    LABS:                        7.4    6.07  )-----------( 200      ( 27 May 2020 07:15 )             23.1     05-27    134<L>  |  102  |  33<H>  ----------------------------<  121<H>  5.3   |  23  |  1.34<H>    Ca    9.2      27 May 2020 07:15    TPro  6.3  /  Alb  3.6  /  TBili  1.0  /  DBili  x   /  AST  12  /  ALT  16  /  AlkPhos  78  05-27          MICROBIOLOGY:    Hepatitis C RNA, Qualitative: NotDetec (05.25.20 @ 10:49)    COVID-19 PCR: NotDetec: This test has been validated by [employer] to be accurate;  though it has not been FDA cleared/approved by the usual pathway  As with all laboratory test, results should be correlated with clinical  findings.  https://www.fda.gov/media/265011/download  https://www.fda.gov/media/349236/download (05.23.20 @ 11:15)            RECENT CULTURES:      RADIOLOGY & ADDITIONAL STUDIES:    < from: Xray Chest 1 View AP/PA (05.22.20 @ 13:50) >  IMPRESSION: Linear scarring in the right lower lobe, unchanged.    < end of copied text >

## 2020-05-27 NOTE — PROGRESS NOTE ADULT - PROBLEM SELECTOR PLAN 2
RHC and biopsy 2/20 normal hemodynamics and neg for rejection.  Hep C + treated (Hep C PCR 5/4 undetectable)  - Continue tacrolimus 8mg q12H; obtain tacrolimus level before 8am dose tomorrow (outpatient Tac 3.4  5/18 dose increased to 8mg BID. It's been subtherapeutic as outpatient for unclear reason)  - will consider d/c cellcept as he will be on Rituxan, high dose prednisone and tacro  - Continue Valganciclovir 450mg daily for CMV prophylaxis. CMV PCR 5/2 <1.70. Recheck CMV viral load per ID  - Change Bactrim to from DS to SS daily given concern for BM suppression  - Voriconazole of fungal ppx. D/u fungitell  - Continue ASA 81mg daily. No active bleeding.  - Continue pravastatin 20mg bedtime  - Continue MgOx for medication- induced hypomagnesemia RHC and biopsy 2/20 normal hemodynamics and neg for rejection.  Hep C + treated (Hep C PCR 5/4 undetectable)  -Will switch tacrolimus to everolimus. Please check urine protein/creatinine ratio  -D/C cellcept as he will be on Rituxan, high dose prednisone and tacro  - Continue Valganciclovir 450mg daily for CMV prophylaxis. CMV PCR 5/2 <1.70. F/u CMV viral load   - Continue Bactrim SS daily given concern for BM suppression  - Voriconazole of fungal ppx. D/u fungitell  - Continue ASA 81mg daily. No active bleeding.  - Continue pravastatin 20mg bedtime  - Continue MgOx for medication- induced hypomagnesemia RHC and biopsy 2/20 normal hemodynamics and neg for rejection.  Hep C + treated (Hep C PCR 5/4 undetectable)  -Will switch tacrolimus to everolimus. Please check urine protein/creatinine ratio  -D/C cellcept as he will be on Rituxan, high dose prednisone and tacro  - Continue Valganciclovir 450mg daily for CMV prophylaxis. CMV PCR 5/2 <1.70. 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 D/u fungitell  - Continue ASA 81mg daily. No active bleeding.  - Continue pravastatin 20mg bedtime  - Continue MgOx for medication- induced hypomagnesemia

## 2020-05-27 NOTE — PROGRESS NOTE ADULT - SUBJECTIVE AND OBJECTIVE BOX
INTERVAL HPI/OVERNIGHT EVENTS:  Patient's hgb stable from yesterday but still slowly downtrending since admission.     VITAL SIGNS:  T(F): 97.9 (05-27-20 @ 05:55)  HR: 102 (05-27-20 @ 05:55)  BP: 127/86 (05-27-20 @ 05:55)  RR: 18 (05-27-20 @ 05:55)  SpO2: 98% (05-27-20 @ 05:55)  Wt(kg): --    PHYSICAL EXAM:    Constitutional: NAD  Eyes: EOMI, sclera non-icteric  Neck: supple, no masses  Respiratory: CTA b/l  Cardiovascular: RRR, no M/R/G  Gastrointestinal: soft, NTND  Extremities: no c/c/e  Neurological: AAOx3      MEDICATIONS  (STANDING):  aspirin enteric coated 81 milliGRAM(s) Oral daily  calcium citrate  1500 mG + Vitamin D (CITRACAL + D3 Maximum) 2 Tablet(s) Oral <User Schedule>  diltiazem    milliGRAM(s) Oral daily  folic acid 1 milliGRAM(s) Oral <User Schedule>  heparin   Injectable 5000 Unit(s) SubCutaneous once  lidocaine 2% Injectable 20 milliLiter(s) Local Injection once  magnesium oxide 400 milliGRAM(s) Oral <User Schedule>  mycophenolate mofetil 500 milliGRAM(s) Oral <User Schedule>  nystatin    Suspension 941146 Unit(s) Oral four times a day  pantoprazole    Tablet 40 milliGRAM(s) Oral before breakfast  pravastatin 20 milliGRAM(s) Oral at bedtime  predniSONE   Tablet 70 milliGRAM(s) Oral <User Schedule>  sodium bicarbonate 650 milliGRAM(s) Oral <User Schedule>  sodium zirconium cyclosilicate 10 Gram(s) Oral <User Schedule>  tacrolimus 8 milliGRAM(s) Oral <User Schedule>  trimethoprim   80 mG/sulfamethoxazole 400 mG 1 Tablet(s) Oral <User Schedule>  valGANciclovir 450 milliGRAM(s) Oral <User Schedule>    MEDICATIONS  (PRN):  meperidine     Injectable 25 milliGRAM(s) IV Push once PRN Rigors      Allergies    No Known Allergies    Intolerances        LABS:                        7.4    6.07  )-----------( 200      ( 27 May 2020 07:15 )             23.1     05-27    134<L>  |  102  |  33<H>  ----------------------------<  121<H>  5.3   |  23  |  1.34<H>    Ca    9.2      27 May 2020 07:15    TPro  6.3  /  Alb  3.6  /  TBili  1.0  /  DBili  x   /  AST  12  /  ALT  16  /  AlkPhos  78  05-27          RADIOLOGY & ADDITIONAL TESTS:  Studies reviewed.    ASSESSMENT & PLAN:

## 2020-05-27 NOTE — PROGRESS NOTE ADULT - ASSESSMENT
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 COVID-19 PCR negative in ER. Beside low grade temp(100.4) in ER, if he develops fever, we need to obtain culture and empiric antibiotics but no signs or symptoms of infection at this time. He reported mild dyspnea in the past few days with walking around the house sometimes. He is satting well in RA. CBC w/ pancytopenia w/ low retic count and no e/o of hemolysis. Possibly related to valcyte + bactrim. No evidence of PTLD on imaging. He appears well, euvolemic, and nontoxic. 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 COVID-19 PCR negative in ER. Beside low grade temp (100.4) in ER, no signs or symptoms of infection at this time. He reported mild dyspnea in the past few days prior to admission with walking around the house sometimes. He is satting well in RA. CBC w/ pancytopenia w/ low retic count and no e/o of hemolysis. Possibly related to valcyte + bactrim. No evidence of PTLD on imaging. He appears well, euvolemic, and nontoxic.

## 2020-05-27 NOTE — PROGRESS NOTE ADULT - ASSESSMENT
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:  - underlying etiology unknown - infectious workup was negative on last admission (PCR negative: EBV, parvovirus, Hep B/C, HIV)  - peripheral flow cytometry 5/2: negative  - possibly related to medications (tacrolimus), but generally seen within 1 year of starting medication  - proving to be steroid refractory: has been on prednisone 70-75mg since 5/4 with continued decrease in hemoglobin  - no evidence of lymphadenopathy or malignancy on imaging  - continue prednisone 70mg daily  - continue folic acid 1mg daily  - bactrim DS changed to SS daily due to concern for bone marrow suppression   - s/p C1 rituxan 5/23, next due 5/28  - LDH and hapto normal which argues against active hemolysis. Unclear the reason for isolated anemia (looking back his hgb has remained 8-9 since 21383. 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:  - underlying etiology unknown - infectious workup was negative on last admission (PCR negative: EBV, parvovirus, Hep B/C, HIV)  - peripheral flow cytometry 5/2: negative  - possibly related to medications (tacrolimus), but generally seen within 1 year of starting medication  - proving to be steroid refractory: has been on prednisone 70-75mg since 5/4 with continued decrease in hemoglobin  - no evidence of lymphadenopathy or malignancy on imaging  - continue prednisone 70mg daily  - continue folic acid 1mg daily  - would recommend switching bactrim for mepron for DVT ppx   - s/p C1 rituxan 5/23, next due 5/28  - LDH and hapto normal which argues against active hemolysis. Unclear the reason for isolated anemia (looking back his hgb has remained 8-9 since 20170. 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:  - underlying etiology unknown - infectious workup was negative on last admission (PCR negative: EBV, parvovirus, Hep B/C, HIV)  - peripheral flow cytometry 5/2: negative  - possibly related to medications (tacrolimus), but generally seen within 1 year of starting medication  - proving to be steroid refractory: has been on prednisone 70-75mg since 5/4 with continued decrease in hemoglobin  - no evidence of lymphadenopathy or malignancy on imaging  - continue prednisone 70mg daily  - continue folic acid 1mg daily  - would recommend switching bactrim for mepron for PCP ppx   - s/p C1 rituxan 5/23, next due 5/28  - LDH and hapto normal which argues against active hemolysis. Unclear the reason for isolated anemia (looking back his hgb has remained 8-9 since 20170. Low retic raises concern for bone marrow suppression perhaps 2/2 immunosuppression vs underlying bone marrow etiology   - s/p bone marrow biopsy today, pending results     Prudence Dan  Hematology-Oncology PGY-5  [phone]

## 2020-05-27 NOTE — PROGRESS NOTE ADULT - PROBLEM SELECTOR PLAN 1
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 Infectious work up has been negative. Reportedly refractory to steroids however hemolysis labs as outpatient were improving. Now w/ pancytopenia.  - No evidence of hemolysis of on labs or PTLD on imaging  - Hematology following - Recommending 4 more cycles of Rituxan (1 more infusion while inpatient).  - ID following - initial infection workup was negative on last admission (PCR negative: EBV, parvovirus, Hep B/C, HIV). F/u blood parasite PCR testing for babesia and tick borne panel.   - May need PICC line prior to discharge given difficulty w/ IV access as OP. Will need to weigh risk of infection given immunosuppressed state.   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 Infectious work up has been negative. Reportedly refractory to steroids however hemolysis labs as outpatient were improving. Will discuss w/ hematology need for ongoing rituxan  - Now w/ pancytopenia.   - No evidence of hemolysis of on labs or PTLD on imaging  - Hematology following - F/u BM biopsy  - ID following - initial infection workup was negative on last admission (PCR negative: EBV, parvovirus, Hep B/C, HIV). F/u blood parasite PCR testing for babesia and tick borne panel.   - May need PICC line prior to discharge given difficulty w/ IV access as OP. Will need to weigh risk of infection given immunosuppressed state.   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 Infectious work up has been negative. Reportedly refractory to steroids however hemolysis labs as outpatient were improving. Plan for BM biopsy today results of which will dictate whether we should continue Rituxan   - Now w/ pancytopenia possibly 2/2 medication-induced BM suppression   - No evidence of hemolysis of on labs or PTLD on imaging  - Hematology following - F/u BM biopsy  - ID following - initial infection workup was negative on last admission (PCR negative: EBV, parvovirus, Hep B/C, HIV). F/u blood parasite PCR testing for babesia and tick borne panel.   - May need PICC line prior to discharge given difficulty w/ IV access as OP. Will need to weigh risk of infection given immunosuppressed state.   - Continue folic acid  - Continue prednisone 70 mg daily

## 2020-05-27 NOTE — PROGRESS NOTE ADULT - ASSESSMENT
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:  - underlying etiology unknown - initial infection workup was negative on last admission (PCR negative: EBV, parvovirus, Hep B/C, HIV)  - will consider other infectious etiologies tick borne illnesses Babesia, ehrlichia/Lyme (not usually a cause of anemia)- will send for blood parasite PCR testing for babesia and tick borne panel   Can also repeat EBV PCR, Parvovirus PCR if above studies are negative    # OI prophylaxis    Given the high dose steroids 70mg/day prednisone- He needs Bactrim for PJP prevention  Valganciclovir for CMV prevention- repeat CMV viral laod  Antifungal prophylaxis added with Voriconazole- send fungitell      Gary Lujan MD  [phone]  After 5pm/weekends [phone]

## 2020-05-27 NOTE — PROGRESS NOTE ADULT - ASSESSMENT
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 Presented today to [facility] for IV Rituxan therapy. But unable to obtain intravenous access despite multiple attempts. Patient was also found to be severely anemic with a hemoglobin 6.1 and was sent in to [facility] ER for PRBC transfusion and to initiate Rituxan therapy. In ER Occult stool negative. COVID-19 PCR negative. Noted a low grade temp(100.4).   Hospital Course:   Admitted to 2 [facility] Telemetry floor. Awaiting Type and Screen. 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 Infectious work up has been negative. Refractory to steroids  Plan to start Rituxan therapy in AM. CHF following  5/23 VSS; guaic neg; 2 units prbc transfused overnight; hemeonc following; Rituxan given today as per hemeonc  tacro level 6.1- continue tacro 8 bid   5/24 VSS 7.7/24.2 < from: CT Chest No Cont (05.22.20 @ 20:29) > No change in the right upper lobe linear opacity likely due to scarring or the additional bilateral linear opacities likely due to atelectasis or scarring. 2.  Emphysema. 3.  No enlarged chest lymph nodes.  5/25 VSS, H/H stable 7.8/24.3 today. 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 Presented today to [facility] for IV Rituxan therapy. But unable to obtain intravenous access despite multiple attempts. Patient was also found to be severely anemic with a hemoglobin 6.1 and was sent in to [facility] ER for PRBC transfusion and to initiate Rituxan therapy. In ER Occult stool negative. COVID-19 PCR negative. Noted a low grade temp(100.4).   Hospital Course:   Admitted to 2 [facility] Telemetry floor. Awaiting Type and Screen. 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 Infectious work up has been negative. Refractory to steroids  Plan to start Rituxan therapy in AM. CHF following  5/23 VSS; guaic neg; 2 units prbc transfused overnight; hemeonc following; Rituxan given today as per hemeonc  tacro level 6.1- continue tacro 8 bid   5/24 VSS 7.7/24.2 < from: CT Chest No Cont (05.22.20 @ 20:29) > No change in the right upper lobe linear opacity likely due to scarring or the additional bilateral linear opacities likely due to atelectasis or scarring. 2.  Emphysema. 3.  No enlarged chest lymph nodes.  5/25 VSS, H/H stable 7.8/24.3 today. Tacrolimus level 7.5  5/26 hemodynamics stable  Tacro Lvl> 7.9   lokelma  daily  for ^ K   Valcyte changed daily   5/27 VSS; tacro level today 7.9; rituxan as per hemeonc on 5/29

## 2020-05-27 NOTE — PROGRESS NOTE ADULT - SUBJECTIVE AND OBJECTIVE BOX
VITAL SIGNS    Telemetry:    Vital Signs Last 24 Hrs  T(C): 36.6 (05-27-20 @ 05:55), Max: 36.6 (05-26-20 @ 11:52)  T(F): 97.9 (05-27-20 @ 05:55), Max: 97.9 (05-26-20 @ 11:52)  HR: 102 (05-27-20 @ 05:55) (102 - 112)  BP: 127/86 (05-27-20 @ 05:55) (126/75 - 136/76)  RR: 18 (05-27-20 @ 05:55) (18 - 18)  SpO2: 98% (05-27-20 @ 05:55) (98% - 100%)            05-26 @ 07:01  -  05-27 @ 07:00  --------------------------------------------------------  IN: 730 mL / OUT: 1150 mL / NET: -420 mL    05-27 @ 07:01  -  05-27 @ 10:55  --------------------------------------------------------  IN: 240 mL / OUT: 0 mL / NET: 240 mL       Daily     Daily   Admit Wt: Drug Dosing Weight  Height (cm): 172.72 (22 May 2020 13:07)  Weight (kg): 77.1 (22 May 2020 13:07)  BMI (kg/m2): 25.8 (22 May 2020 13:07)  BSA (m2): 1.91 (22 May 2020 13:07)    Bilirubin Total, Serum: 1.0 mg/dL (05-27 @ 07:15)    CAPILLARY BLOOD GLUCOSE              aspirin enteric coated 81 milliGRAM(s) Oral daily  calcium citrate  1500 mG + Vitamin D (CITRACAL + D3 Maximum) 2 Tablet(s) Oral <User Schedule>  diltiazem    milliGRAM(s) Oral daily  folic acid 1 milliGRAM(s) Oral <User Schedule>  heparin   Injectable 5000 Unit(s) SubCutaneous once  lidocaine 2% Injectable 20 milliLiter(s) Local Injection once  magnesium oxide 400 milliGRAM(s) Oral <User Schedule>  meperidine     Injectable 25 milliGRAM(s) IV Push once PRN  mycophenolate mofetil 500 milliGRAM(s) Oral <User Schedule>  nystatin    Suspension 372759 Unit(s) Oral four times a day  pantoprazole    Tablet 40 milliGRAM(s) Oral before breakfast  pravastatin 20 milliGRAM(s) Oral at bedtime  predniSONE   Tablet 70 milliGRAM(s) Oral <User Schedule>  sodium bicarbonate 650 milliGRAM(s) Oral <User Schedule>  sodium zirconium cyclosilicate 10 Gram(s) Oral <User Schedule>  tacrolimus 8 milliGRAM(s) Oral <User Schedule>  trimethoprim   80 mG/sulfamethoxazole 400 mG 1 Tablet(s) Oral <User Schedule>  valGANciclovir 450 milliGRAM(s) Oral <User Schedule>      PHYSICAL EXAM    Subjective: "Hi.   Neurology: alert and oriented x 3, nonfocal, no gross deficits  CV : tele:  RSR  Sternal Wound :  CDI with dressing , Stable  Lungs: clear. RR easy, unlabored   Abdomen: soft, nontender, nondistended, positive bowel sounds, bowel movement   Neg N/V/D   :  pt voiding without difficulty   Extremities:   RUVALCABA; edema, neg calf tenderness.   PPP bilaterally      PW:  Chest tubes: VITAL SIGNS    Telemetry:  rsr    Vital Signs Last 24 Hrs  T(C): 36.6 (05-27-20 @ 05:55), Max: 36.6 (05-26-20 @ 11:52)  T(F): 97.9 (05-27-20 @ 05:55), Max: 97.9 (05-26-20 @ 11:52)  HR: 102 (05-27-20 @ 05:55) (102 - 112)  BP: 127/86 (05-27-20 @ 05:55) (126/75 - 136/76)  RR: 18 (05-27-20 @ 05:55) (18 - 18)  SpO2: 98% (05-27-20 @ 05:55) (98% - 100%)            05-26 @ 07:01  -  05-27 @ 07:00  --------------------------------------------------------  IN: 730 mL / OUT: 1150 mL / NET: -420 mL    05-27 @ 07:01  -  05-27 @ 10:55  --------------------------------------------------------  IN: 240 mL / OUT: 0 mL / NET: 240 mL       Daily     Daily   Admit Wt: Drug Dosing Weight  Height (cm): 172.72 (22 May 2020 13:07)  Weight (kg): 77.1 (22 May 2020 13:07)  BMI (kg/m2): 25.8 (22 May 2020 13:07)  BSA (m2): 1.91 (22 May 2020 13:07)    Bilirubin Total, Serum: 1.0 mg/dL (05-27 @ 07:15)        aspirin enteric coated 81 milliGRAM(s) Oral daily  calcium citrate  1500 mG + Vitamin D (CITRACAL + D3 Maximum) 2 Tablet(s) Oral <User Schedule>  diltiazem    milliGRAM(s) Oral daily  folic acid 1 milliGRAM(s) Oral <User Schedule>  heparin   Injectable 5000 Unit(s) SubCutaneous once  lidocaine 2% Injectable 20 milliLiter(s) Local Injection once  magnesium oxide 400 milliGRAM(s) Oral <User Schedule>  meperidine     Injectable 25 milliGRAM(s) IV Push once PRN  mycophenolate mofetil 500 milliGRAM(s) Oral <User Schedule>  nystatin    Suspension 629154 Unit(s) Oral four times a day  pantoprazole    Tablet 40 milliGRAM(s) Oral before breakfast  pravastatin 20 milliGRAM(s) Oral at bedtime  predniSONE   Tablet 70 milliGRAM(s) Oral <User Schedule>  sodium bicarbonate 650 milliGRAM(s) Oral <User Schedule>  sodium zirconium cyclosilicate 10 Gram(s) Oral <User Schedule>  tacrolimus 8 milliGRAM(s) Oral <User Schedule>  trimethoprim   80 mG/sulfamethoxazole 400 mG 1 Tablet(s) Oral <User Schedule>  valGANciclovir 450 milliGRAM(s) Oral <User Schedule>      PHYSICAL EXAM    Subjective: "I feel ok."   Neurology: alert and oriented x 3, nonfocal, no gross deficits  CV : tele:  RSR    Sternal Wound :  CDI NAZ - well-healed   Lungs: clear. RR easy, unlabored   Abdomen: soft, nontender, nondistended, positive bowel sounds, + bowel movement   Neg N/V/D   :  pt voiding without difficulty   Extremities:   RUVALCABA; neg edema, neg calf tenderness.   PPP bilaterally

## 2020-05-28 LAB
ALBUMIN SERPL ELPH-MCNC: 3.7 G/DL — SIGNIFICANT CHANGE UP (ref 3.3–5)
ALP SERPL-CCNC: 78 U/L — SIGNIFICANT CHANGE UP (ref 40–120)
ALT FLD-CCNC: 16 U/L — SIGNIFICANT CHANGE UP (ref 10–45)
ANION GAP SERPL CALC-SCNC: 9 MMOL/L — SIGNIFICANT CHANGE UP (ref 5–17)
AST SERPL-CCNC: 12 U/L — SIGNIFICANT CHANGE UP (ref 10–40)
BILIRUB SERPL-MCNC: 0.9 MG/DL — SIGNIFICANT CHANGE UP (ref 0.2–1.2)
BUN SERPL-MCNC: 36 MG/DL — HIGH (ref 7–23)
CALCIUM SERPL-MCNC: 9.5 MG/DL — SIGNIFICANT CHANGE UP (ref 8.4–10.5)
CHLORIDE SERPL-SCNC: 101 MMOL/L — SIGNIFICANT CHANGE UP (ref 96–108)
CMV DNA CSF QL NAA+PROBE: SIGNIFICANT CHANGE UP
CMV DNA SPEC NAA+PROBE-LOG#: SIGNIFICANT CHANGE UP LOG10IU/ML
CO2 SERPL-SCNC: 24 MMOL/L — SIGNIFICANT CHANGE UP (ref 22–31)
CREAT ?TM UR-MCNC: 109 MG/DL — SIGNIFICANT CHANGE UP
CREAT SERPL-MCNC: 1.43 MG/DL — HIGH (ref 0.5–1.3)
GLUCOSE SERPL-MCNC: 153 MG/DL — HIGH (ref 70–99)
HAPTOGLOB SERPL-MCNC: 88 MG/DL — SIGNIFICANT CHANGE UP (ref 34–200)
HCT VFR BLD CALC: 23.5 % — LOW (ref 39–50)
HGB BLD-MCNC: 7.7 G/DL — LOW (ref 13–17)
LDH SERPL L TO P-CCNC: 216 U/L — SIGNIFICANT CHANGE UP (ref 50–242)
MCHC RBC-ENTMCNC: 30.3 PG — SIGNIFICANT CHANGE UP (ref 27–34)
MCHC RBC-ENTMCNC: 32.8 GM/DL — SIGNIFICANT CHANGE UP (ref 32–36)
MCV RBC AUTO: 92.5 FL — SIGNIFICANT CHANGE UP (ref 80–100)
NRBC # BLD: 0 /100 WBCS — SIGNIFICANT CHANGE UP (ref 0–0)
PLATELET # BLD AUTO: 253 K/UL — SIGNIFICANT CHANGE UP (ref 150–400)
POTASSIUM SERPL-MCNC: 4.9 MMOL/L — SIGNIFICANT CHANGE UP (ref 3.5–5.3)
POTASSIUM SERPL-SCNC: 4.9 MMOL/L — SIGNIFICANT CHANGE UP (ref 3.5–5.3)
PROT ?TM UR-MCNC: 21 MG/DL — HIGH (ref 0–12)
PROT SERPL-MCNC: 6.3 G/DL — SIGNIFICANT CHANGE UP (ref 6–8.3)
PROT/CREAT UR-RTO: 0.2 RATIO — SIGNIFICANT CHANGE UP (ref 0–0.2)
RBC # BLD: 2.54 M/UL — LOW (ref 4.2–5.8)
RBC # BLD: 2.54 M/UL — LOW (ref 4.2–5.8)
RBC # FLD: 15.4 % — HIGH (ref 10.3–14.5)
RETICS #: 24.9 K/UL — LOW (ref 25–125)
RETICS/RBC NFR: 1 % — SIGNIFICANT CHANGE UP (ref 0.5–2.5)
SODIUM SERPL-SCNC: 134 MMOL/L — LOW (ref 135–145)
TACROLIMUS SERPL-MCNC: 7.8 NG/ML — SIGNIFICANT CHANGE UP
WBC # BLD: 6.49 K/UL — SIGNIFICANT CHANGE UP (ref 3.8–10.5)
WBC # FLD AUTO: 6.49 K/UL — SIGNIFICANT CHANGE UP (ref 3.8–10.5)

## 2020-05-28 PROCEDURE — 99232 SBSQ HOSP IP/OBS MODERATE 35: CPT

## 2020-05-28 PROCEDURE — 99233 SBSQ HOSP IP/OBS HIGH 50: CPT | Mod: GC

## 2020-05-28 RX ORDER — ATOVAQUONE 750 MG/5ML
750 SUSPENSION ORAL DAILY
Refills: 0 | Status: DISCONTINUED | OUTPATIENT
Start: 2020-05-28 | End: 2020-05-28

## 2020-05-28 RX ORDER — MYCOPHENOLATE MOFETIL 250 MG/1
250 CAPSULE ORAL TWICE DAILY
Qty: 120 | Refills: 5 | Status: DISCONTINUED | COMMUNITY
Start: 2019-07-18 | End: 2020-05-28

## 2020-05-28 RX ADMIN — Medication 2 TABLET(S): at 20:10

## 2020-05-28 RX ADMIN — Medication 500000 UNIT(S): at 06:35

## 2020-05-28 RX ADMIN — Medication 1 MILLIGRAM(S): at 08:15

## 2020-05-28 RX ADMIN — VALGANCICLOVIR 450 MILLIGRAM(S): 450 TABLET, FILM COATED ORAL at 08:16

## 2020-05-28 RX ADMIN — Medication 500000 UNIT(S): at 17:30

## 2020-05-28 RX ADMIN — Medication 500000 UNIT(S): at 11:44

## 2020-05-28 RX ADMIN — SODIUM ZIRCONIUM CYCLOSILICATE 10 GRAM(S): 10 POWDER, FOR SUSPENSION ORAL at 17:30

## 2020-05-28 RX ADMIN — Medication 500000 UNIT(S): at 22:15

## 2020-05-28 RX ADMIN — ATOVAQUONE 750 MILLIGRAM(S): 750 SUSPENSION ORAL at 11:44

## 2020-05-28 RX ADMIN — TACROLIMUS 8 MILLIGRAM(S): 5 CAPSULE ORAL at 20:10

## 2020-05-28 RX ADMIN — Medication 1 TABLET(S): at 08:17

## 2020-05-28 RX ADMIN — MAGNESIUM OXIDE 400 MG ORAL TABLET 400 MILLIGRAM(S): 241.3 TABLET ORAL at 08:16

## 2020-05-28 RX ADMIN — Medication 650 MILLIGRAM(S): at 08:16

## 2020-05-28 RX ADMIN — Medication 650 MILLIGRAM(S): at 20:10

## 2020-05-28 RX ADMIN — PANTOPRAZOLE SODIUM 40 MILLIGRAM(S): 20 TABLET, DELAYED RELEASE ORAL at 06:35

## 2020-05-28 RX ADMIN — Medication 70 MILLIGRAM(S): at 08:16

## 2020-05-28 RX ADMIN — MYCOPHENOLATE MOFETIL 500 MILLIGRAM(S): 250 CAPSULE ORAL at 08:16

## 2020-05-28 RX ADMIN — Medication 120 MILLIGRAM(S): at 06:35

## 2020-05-28 RX ADMIN — TACROLIMUS 8 MILLIGRAM(S): 5 CAPSULE ORAL at 08:16

## 2020-05-28 RX ADMIN — Medication 81 MILLIGRAM(S): at 11:44

## 2020-05-28 RX ADMIN — Medication 2 TABLET(S): at 08:15

## 2020-05-28 RX ADMIN — Medication 20 MILLIGRAM(S): at 22:15

## 2020-05-28 NOTE — PROGRESS NOTE ADULT - ASSESSMENT
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 Presented today to [facility] for IV Rituxan therapy. But unable to obtain intravenous access despite multiple attempts. Patient was also found to be severely anemic with a hemoglobin 6.1 and was sent in to [facility] ER for PRBC transfusion and to initiate Rituxan therapy. In ER Occult stool negative. COVID-19 PCR negative. Noted a low grade temp(100.4).   Hospital Course:   Admitted to 2 [facility] Telemetry floor. Awaiting Type and Screen. 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 Infectious work up has been negative. Refractory to steroids  Plan to start Rituxan therapy in AM. CHF following  5/23 VSS; guaiac neg; 2 units PRBC transfused overnight; heme/onc following; Rituxan given today as per heme/onc. Tacro level 6.1- continue tacro 8 bid   5/24 VSS 7.7/24.2. CT Chest No Cont (05.22.20 @ 20:29) No change in the right upper lobe linear opacity likely due to scarring or the additional bilateral linear opacities likely due to atelectasis or scarring. Emphysema. No enlarged chest lymph nodes.  5/25 VSS, H/H stable 7.8/24.3 today. Tacrolimus level 7.5  5/26 hemodynamics stable Tacro Level 7.9. Lokelma daily for ^ K. Valcyte changed daily   5/27 VSS; tacro level today 7.9; Rituxan as per hemeonc on 5/29. s/p bone marrow biopsy today, pending results.   5/28 VVS; Continue with current medication regimen. Plan for Rituxin per heme in AM.

## 2020-05-28 NOTE — PROGRESS NOTE ADULT - SUBJECTIVE AND OBJECTIVE BOX
***Preliminary Note***    NOTE: All current Cardiology Service and Contact Information can be found at [email or web address], password: [email or web address]    Subjective: S/p BM biopsy yesterday. No acute overnight events.    Medications:  MEDICATIONS  (STANDING):  aspirin enteric coated 81 milliGRAM(s) Oral daily  calcium citrate  1500 mG + Vitamin D (CITRACAL + D3 Maximum) 2 Tablet(s) Oral <User Schedule>  diltiazem    milliGRAM(s) Oral daily  folic acid 1 milliGRAM(s) Oral <User Schedule>  magnesium oxide 400 milliGRAM(s) Oral <User Schedule>  mycophenolate mofetil 500 milliGRAM(s) Oral <User Schedule>  nystatin    Suspension 088131 Unit(s) Oral four times a day  pantoprazole    Tablet 40 milliGRAM(s) Oral before breakfast  pravastatin 20 milliGRAM(s) Oral at bedtime  predniSONE   Tablet 70 milliGRAM(s) Oral <User Schedule>  sodium bicarbonate 650 milliGRAM(s) Oral <User Schedule>  sodium zirconium cyclosilicate 10 Gram(s) Oral <User Schedule>  tacrolimus 8 milliGRAM(s) Oral <User Schedule>  trimethoprim   80 mG/sulfamethoxazole 400 mG 1 Tablet(s) Oral <User Schedule>  valGANciclovir 450 milliGRAM(s) Oral <User Schedule>    MEDICATIONS  (PRN):  meperidine     Injectable 25 milliGRAM(s) IV Push once PRN Rigors      Vitals:  Vital Signs Last 24 Hrs  T(C): 36.7 (28 May 2020 06:36), Max: 36.7 (27 May 2020 20:55)  T(F): 98 (28 May 2020 06:36), Max: 98.1 (27 May 2020 20:55)  HR: 100 (28 May 2020 06:36) (100 - 112)  BP: 125/82 (28 May 2020 06:36) (125/82 - 138/87)  BP(mean): --  RR: 18 (28 May 2020 06:36) (18 - 18)  SpO2: 100% (28 May 2020 06:36) (100% - 100%)    I&O's Summary    27 May 2020 07:01  -  28 May 2020 07:00  --------------------------------------------------------  IN: 1420 mL / OUT: 1450 mL / NET: -30 mL    Daily Weight in k.3 (26 May 2020 07:50)  Daily     Daily     Physical Exam:  Appearance: No Acute Distress  HEENT: No JVD  Cardiovascular: IV/VI machine-like systolic murmur, regular rate and rhythm   Respiratory: Clear to auscultation bilaterally  Gastrointestinal: Soft, Non-tender, non-distended	  Skin: no skin lesions  Neurologic: Non-focal  Extremities: No LE edema, warm and well perfused  Psychiatry: A & O x 3, Mood & affect appropriate      Labs:                        7.4    6.07  )-----------( 200      ( 27 May 2020 07:15 )             23.1                    7.6    6.35  )-----------( 170      ( 26 May 2020 08:29 )             24.0                           7.7    5.91  )-----------( 128      ( 24 May 2020 08:14 )             24.2     0527    134<L>  |  102  |  33<H>  ----------------------------<  121<H>  5.3   |  23  |  1.34<H>    Ca    9.2      27 May 2020 07:15    TPro  6.3  /  Alb  3.6  /  TBili  1.0  /  DBili  x   /  AST  12  /  ALT  16  /  AlkPhos  78      135  |  102  |  31<H>  ----------------------------<  114<H>  5.2   |  24  |  1.32<H>    Ca    9.3      26 May 2020 08:29    TPro  6.5  /  Alb  3.6  /  TBili  1.2  /  DBili  x   /  AST  14  /  ALT  16  /  AlkPhos  67      137  |  102  |  32<H>  ----------------------------<  102<H>  4.7   |  23  |  1.24    Ca    9.0      24 May 2020 08:14    TPro  6.9  /  Alb  3.6  /  TBili  2.7<H>  /  DBili  0.5<H>  /  AST  18  /  ALT  18  /  AlkPhos  75        Reticulocyte Count (20 @ 07:15)    RBC Count: 2.50 M/uL    Reticulocyte Percent: 1.0 %    Absolute Reticulocytes: 24.0 K/uL  Lactate Dehydrogenase, Serum: 256 U/L (20 @ 08:29)  Haptoglobin, Serum: 83 mg/dL (20 @ 12:21) NOTE: All current Cardiology Service and Contact Information can be found at [email or web address], password: [email or web address]    Subjective: S/p BM biopsy yesterday. No acute overnight events. No complaints this morning. In good spirits.     Medications:  MEDICATIONS  (STANDING):  aspirin enteric coated 81 milliGRAM(s) Oral daily  calcium citrate  1500 mG + Vitamin D (CITRACAL + D3 Maximum) 2 Tablet(s) Oral <User Schedule>  diltiazem    milliGRAM(s) Oral daily  folic acid 1 milliGRAM(s) Oral <User Schedule>  magnesium oxide 400 milliGRAM(s) Oral <User Schedule>  mycophenolate mofetil 500 milliGRAM(s) Oral <User Schedule>  nystatin    Suspension 670671 Unit(s) Oral four times a day  pantoprazole    Tablet 40 milliGRAM(s) Oral before breakfast  pravastatin 20 milliGRAM(s) Oral at bedtime  predniSONE   Tablet 70 milliGRAM(s) Oral <User Schedule>  sodium bicarbonate 650 milliGRAM(s) Oral <User Schedule>  sodium zirconium cyclosilicate 10 Gram(s) Oral <User Schedule>  tacrolimus 8 milliGRAM(s) Oral <User Schedule>  trimethoprim   80 mG/sulfamethoxazole 400 mG 1 Tablet(s) Oral <User Schedule>  valGANciclovir 450 milliGRAM(s) Oral <User Schedule>    MEDICATIONS  (PRN):  meperidine     Injectable 25 milliGRAM(s) IV Push once PRN Rigors      Vitals:  Vital Signs Last 24 Hrs  T(C): 36.7 (28 May 2020 06:36), Max: 36.7 (27 May 2020 20:55)  T(F): 98 (28 May 2020 06:36), Max: 98.1 (27 May 2020 20:55)  HR: 100 (28 May 2020 06:36) (100 - 112)  BP: 125/82 (28 May 2020 06:36) (125/82 - 138/87)  BP(mean): --  RR: 18 (28 May 2020 06:36) (18 - 18)  SpO2: 100% (28 May 2020 06:36) (100% - 100%)    I&O's Summary    27 May 2020 07:01  -  28 May 2020 07:00  --------------------------------------------------------  IN: 1420 mL / OUT: 1450 mL / NET: -30 mL    Daily Weight in k.3 (26 May 2020 07:50)  Daily     Daily     Physical Exam:  Appearance: No Acute Distress  HEENT: No JVD  Cardiovascular: IV/VI machine-like systolic murmur, regular rate and rhythm   Respiratory: Clear to auscultation bilaterally  Gastrointestinal: Soft, Non-tender, non-distended	  Skin: no skin lesions  Neurologic: Non-focal  Extremities: No LE edema, warm and well perfused  Psychiatry: A & O x 3, Mood & affect appropriate      Labs:                        7.4    6.07  )-----------( 200      ( 27 May 2020 07:15 )             23.1                    7.6    6.35  )-----------( 170      ( 26 May 2020 08:29 )             24.0                           7.7    5.91  )-----------( 128      ( 24 May 2020 08:14 )             24.2     05    134<L>  |  102  |  33<H>  ----------------------------<  121<H>  5.3   |  23  |  1.34<H>    Ca    9.2      27 May 2020 07:15    TPro  6.3  /  Alb  3.6  /  TBili  1.0  /  DBili  x   /  AST  12  /  ALT  16  /  AlkPhos  78      135  |  102  |  31<H>  ----------------------------<  114<H>  5.2   |  24  |  1.32<H>    Ca    9.3      26 May 2020 08:29    TPro  6.5  /  Alb  3.6  /  TBili  1.2  /  DBili  x   /  AST  14  /  ALT  16  /  AlkPhos  67      137  |  102  |  32<H>  ----------------------------<  102<H>  4.7   |  23  |  1.24    Ca    9.0      24 May 2020 08:14    TPro  6.9  /  Alb  3.6  /  TBili  2.7<H>  /  DBili  0.5<H>  /  AST  18  /  ALT  18  /  AlkPhos  75        Reticulocyte Count (20 @ 07:15)    RBC Count: 2.50 M/uL    Reticulocyte Percent: 1.0 %    Absolute Reticulocytes: 24.0 K/uL  Lactate Dehydrogenase, Serum: 256 U/L (20 @ 08:29)  Haptoglobin, Serum: 83 mg/dL (20 @ 12:21)

## 2020-05-28 NOTE — PROGRESS NOTE ADULT - SUBJECTIVE AND OBJECTIVE BOX
INFECTIOUS DISEASES FOLLOW UP-- Sujey Lujan  [phone]    This is a follow up note for this  70yMale with  Other autoimmune hemolytic anemias  plt count improving      ROS:  CONSTITUTIONAL:  No fever, good appetite  CARDIOVASCULAR:  No chest pain or palpitations  RESPIRATORY:  No dyspnea  GASTROINTESTINAL:  No nausea, vomiting, diarrhea, or abdominal pain  GENITOURINARY:  No dysuria  NEUROLOGIC:  No headache,     Allergies    No Known Allergies    Intolerances        ANTIBIOTICS/RELEVANT:  antimicrobials  nystatin    Suspension 464683 Unit(s) Oral four times a day  valGANciclovir 450 milliGRAM(s) Oral <User Schedule>    immunologic:  tacrolimus 8 milliGRAM(s) Oral <User Schedule>    OTHER:  aspirin enteric coated 81 milliGRAM(s) Oral daily  calcium citrate  1500 mG + Vitamin D (CITRACAL + D3 Maximum) 2 Tablet(s) Oral <User Schedule>  diltiazem    milliGRAM(s) Oral daily  folic acid 1 milliGRAM(s) Oral <User Schedule>  magnesium oxide 400 milliGRAM(s) Oral <User Schedule>  meperidine     Injectable 25 milliGRAM(s) IV Push once PRN  pantoprazole    Tablet 40 milliGRAM(s) Oral before breakfast  pravastatin 20 milliGRAM(s) Oral at bedtime  predniSONE   Tablet 70 milliGRAM(s) Oral <User Schedule>  sodium bicarbonate 650 milliGRAM(s) Oral <User Schedule>  sodium zirconium cyclosilicate 10 Gram(s) Oral <User Schedule>      Objective:  Vital Signs Last 24 Hrs  T(C): 36.5 (28 May 2020 12:13), Max: 36.7 (27 May 2020 20:55)  T(F): 97.7 (28 May 2020 12:13), Max: 98.1 (27 May 2020 20:55)  HR: 114 (28 May 2020 12:13) (100 - 114)  BP: 95/61 (28 May 2020 12:13) (95/61 - 138/87)  BP(mean): --  RR: 18 (28 May 2020 12:13) (18 - 18)  SpO2: 98% (28 May 2020 12:13) (98% - 100%)    PHYSICAL EXAM:  Constitutional:no acute distress  Eyes:WALT, EOMI  Ear/Nose/Throat: no oral lesions, 	  Respiratory: clear BL  Cardiovascular: S1S2  Gastrointestinal:soft, (+) BS, no tenderness  Extremities:no e/e/c  No Lymphadenopathy  IV sites not inflammed.    LABS:                        7.7    6.49  )-----------( 253      ( 28 May 2020 07:45 )             23.5     05-28    134<L>  |  101  |  36<H>  ----------------------------<  153<H>  4.9   |  24  |  1.43<H>    Ca    9.5      28 May 2020 07:45    TPro  6.3  /  Alb  3.7  /  TBili  0.9  /  DBili  x   /  AST  12  /  ALT  16  /  AlkPhos  78  05-28          MICROBIOLOGY:            RECENT CULTURES:  05-27 @ 10:16  .Blood  --  --  --    Babesia microti PCR  Results: NOT detected  ***************Result Note*************  The detection of Babesia microti by PCR has only been  validated for whole blood; this test has not been approved  by the [place] Food and Drug Administration (FDA). Performance  characteristics of this assay have been determined by  [employer]. The clinical significance  of results should be considered in conjunction with the  overall clinical presentation of the patient. Result is not  intended to be used as the sole means for clinical diagnosis  or patient management decisions.  One negative sample does not necessarily rule  out the presence of a parasitic infection.  --      RADIOLOGY & ADDITIONAL STUDIES:    < from: CT Chest No Cont (05.22.20 @ 20:29) >  IMPRESSION:     1.  No change in the right upper lobe linear opacity likely due to scarring or the additional bilateral linear opacities likely due to atelectasis or scarring.  2.  Emphysema.  3.  No enlarged chest lymph nodes.    < end of copied text >

## 2020-05-28 NOTE — PROGRESS NOTE ADULT - PROBLEM SELECTOR PLAN 1
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 Infectious work up has been negative. Reportedly refractory to steroids however hemolysis labs as outpatient were improving. Plan for BM biopsy today results of which will dictate whether we should continue Rituxan   - Now w/ pancytopenia possibly 2/2 medication-induced BM suppression   - No evidence of hemolysis of on labs or PTLD on imaging  - Hematology following - F/u BM biopsy  - ID following - initial infection workup was negative on last admission (PCR negative: EBV, parvovirus, Hep B/C, HIV). F/u blood parasite PCR testing for babesia and tick borne panel.   - May need PICC line prior to discharge given difficulty w/ IV access as OP. Will need to weigh risk of infection given immunosuppressed state.   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 Infectious work up has been negative. Reportedly refractory to steroids however hemolysis labs as outpatient were improving. Awaiting results of BM biopsy of which will dictate whether we should continue Rituxan   - Now w/ pancytopenia possibly 2/2 medication-induced BM suppression   - No evidence of hemolysis of on labs or PTLD on imaging  - Hematology following - F/u BM biopsy  - ID following - initial infection workup was negative on last admission (PCR negative: EBV, parvovirus, Hep B/C, HIV). PCR testing for babesia negative. F/u tick borne panel.   - If pt needs to stay on Rituxan, may need PICC line prior to discharge given difficulty w/ IV access as OP. Will need to weigh risk of infection given immunosuppressed state.   - Continue folic acid  - Continue prednisone 70 mg daily

## 2020-05-28 NOTE — PROGRESS NOTE ADULT - PROBLEM SELECTOR PLAN 1
Hematology / oncology following  Transfused with 2 units PRBC 5/22  check CBC with DIFF, CMP, fractionated bilirubin, LDH, haptoglobin, Jacky, MIGUELINA, RF, ANCA  CT Chest w/o contrast negative for any lymphadenopathy  Continue prednisone 70mg daily  Continue folic acid 1mg daily  Bactrim  Mon/Wed/Fri for PCP prophylaxis as patient on steroids  - only viral PCR that was detectable from prior admission was CMV  rituximab 5/23 given as per heme/onc- next dose 5/29 as per hemeonc

## 2020-05-28 NOTE — PROGRESS NOTE ADULT - PROBLEM SELECTOR PLAN 2
RHC and biopsy 2/20 normal hemodynamics and neg for rejection.  Hep C + treated (Hep C PCR 5/4 undetectable)  -Will switch tacrolimus to everolimus. Please check urine protein/creatinine ratio  -D/C cellcept as he will be on Rituxan, high dose prednisone and tacro  - Continue Valganciclovir 450mg daily for CMV prophylaxis. CMV PCR 5/2 <1.70. 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 D/u fungitell  - Continue ASA 81mg daily. No active bleeding.  - Continue pravastatin 20mg bedtime  - Continue MgOx for medication- induced hypomagnesemia RHC and biopsy 2/20 normal hemodynamics and neg for rejection.  Hep C + treated (Hep C PCR 5/4 undetectable)  -Will transition tacrolimus to everolimus. F/u urine protein/creatinine ratio  -D/C cellcept as he will be on Rituxan, high dose prednisone and tacro  - Continue Valganciclovir 450mg daily for CMV prophylaxis. CMV PCR 5/2 <1.70. 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 D/u fungitell  - Continue ASA 81mg daily. No active bleeding.  - Continue pravastatin 20mg bedtime  - Continue MgOx for medication- induced hypomagnesemia

## 2020-05-28 NOTE — PROGRESS NOTE ADULT - ASSESSMENT
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:  - underlying etiology unknown - initial infection workup was negative on last admission (PCR negative: EBV, parvovirus, Hep B/C, HIV)  -plt count improving on high dose steroids  - will consider other infectious etiologies tick borne illnesses Babesia PCR was negative, ehrlichia/Lyme (not usually a cause of anemia)- will send for  tick borne panel   Can also repeat EBV PCR, Parvovirus PCR if above studies are negative    # OI prophylaxis    Given the high dose steroids 70mg/day prednisone- He needs Bactrim or mepron for PJP prevention  Valganciclovir for CMV prevention- repeat CMV viral laod  Antifungal prophylaxis nystatin- send fungitell      Gary Lujan MD  [phone]  After 5pm/weekends [phone]

## 2020-05-28 NOTE — PROGRESS NOTE ADULT - SUBJECTIVE AND OBJECTIVE BOX
VITAL SIGNS    Subjective: "I'm feeling good today." Denies CP, palpitation, SOB, LOBATO, HA, dizziness, N/V/D, fever or chills.  No acute event noted overnight.     Telemetry:  ST      Vital Signs Last 24 Hrs  T(C): 36.5 (20 @ 12:13), Max: 36.7 (20 @ 20:55)  T(F): 97.7 (20 @ 12:13), Max: 98.1 (20 @ 20:55)  HR: 114 (20 @ :13) (100 - 114)  BP: 95/61 (20 @ 12:13) (95/61 - 138/87)  RR: 18 (20 @ :13) (18 - 18)  SpO2: 98% (20 @ 12:13) (98% - 100%)            @ 07:01  -   @ 07:00  --------------------------------------------------------  IN: 1420 mL / OUT: 1450 mL / NET: -30 mL     @ 07:01  -   @ 12:52  --------------------------------------------------------  IN: 540 mL / OUT: 0 mL / NET: 540 mL    Daily     Daily Weight in k.4 (28 May 2020 07:40)      PHYSICAL EXAM    Neurology: alert and oriented x 3, nonfocal, no gross deficits    CV: (+) S1 and S2, No murmurs, rubs, gallops or clicks     Lungs: CTA B/L     Abdomen: soft, nontender, nondistended, positive bowel sounds, (+) Flatus; (+) BM     :  Voiding               Extremities:  B/L LE (+) trace edema; negative calf tenderness; (+) 2 DP palpable        aspirin enteric coated 81 milliGRAM(s) Oral daily  atovaquone Suspension 750 milliGRAM(s) Oral daily  calcium citrate  1500 mG + Vitamin D (CITRACAL + D3 Maximum) 2 Tablet(s) Oral <User Schedule>  diltiazem  milliGRAM(s) Oral daily  folic acid 1 milliGRAM(s) Oral <User Schedule>  magnesium oxide 400 milliGRAM(s) Oral <User Schedule>  meperidine   Injectable 25 milliGRAM(s) IV Push once PRN  mycophenolate mofetil 500 milliGRAM(s) Oral <User Schedule>  nystatin Suspension 774506 Unit(s) Oral four times a day  pantoprazole  Tablet 40 milliGRAM(s) Oral before breakfast  pravastatin 20 milliGRAM(s) Oral at bedtime  predniSONE Tablet 70 milliGRAM(s) Oral <User Schedule>  sodium bicarbonate 650 milliGRAM(s) Oral <User Schedule>  sodium zirconium cyclosilicate 10 Gram(s) Oral <User Schedule>  tacrolimus 8 milliGRAM(s) Oral <User Schedule>  valGANciclovir 450 milliGRAM(s) Oral <User Schedule>    Physical Therapy Rec:   Home  [ X ]   Home w/ PT  [  ]  Rehab  [  ]    Discussed with Cardiothoracic Team at AM rounds.

## 2020-05-28 NOTE — PROGRESS NOTE ADULT - PROBLEM SELECTOR PLAN 3
Baseline ~ 1.4  - At baseline.   - Continue sodium bicarb  - Continue Lokelma  - Continue to monitor    ***Preliminary note. Full note to follow after discussion w/ attending*** Baseline ~ 1.4  - At baseline.   - Continue sodium bicarb  - Continue Lokelma  - Counseling on low K diet- recommend nutrition eval  - Continue to monitor

## 2020-05-28 NOTE — PROGRESS NOTE ADULT - ASSESSMENT
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 COVID-19 PCR negative in ER. Beside low grade temp (100.4) in ER, no signs or symptoms of infection at this time. He reported mild dyspnea in the past few days prior to admission with walking around the house sometimes. He is satting well in RA. CBC w/ pancytopenia w/ low retic count and no e/o of hemolysis. Possibly related to valcyte + bactrim. No evidence of PTLD on imaging. He appears well, euvolemic, and nontoxic.

## 2020-05-29 ENCOUNTER — APPOINTMENT (OUTPATIENT)
Dept: INFUSION THERAPY | Facility: HOSPITAL | Age: 70
End: 2020-05-29

## 2020-05-29 DIAGNOSIS — K21.9 GASTRO-ESOPHAGEAL REFLUX DISEASE W/OUT ESOPHAGITIS: ICD-10-CM

## 2020-05-29 DIAGNOSIS — K59.00 CONSTIPATION, UNSPECIFIED: ICD-10-CM

## 2020-05-29 LAB
A PHAGOCYTOPH IGG TITR SER IF: SIGNIFICANT CHANGE UP TITER
ANION GAP SERPL CALC-SCNC: 9 MMOL/L — SIGNIFICANT CHANGE UP (ref 5–17)
B BURGDOR AB SER QL IA: NEGATIVE — SIGNIFICANT CHANGE UP
B MICROTI IGG TITR SER: SIGNIFICANT CHANGE UP TITER
BLD GP AB SCN SERPL QL: POSITIVE — SIGNIFICANT CHANGE UP
BUN SERPL-MCNC: 39 MG/DL — HIGH (ref 7–23)
CALCIUM SERPL-MCNC: 9.1 MG/DL — SIGNIFICANT CHANGE UP (ref 8.4–10.5)
CHLORIDE SERPL-SCNC: 101 MMOL/L — SIGNIFICANT CHANGE UP (ref 96–108)
CO2 SERPL-SCNC: 25 MMOL/L — SIGNIFICANT CHANGE UP (ref 22–31)
CREAT SERPL-MCNC: 1.47 MG/DL — HIGH (ref 0.5–1.3)
DAT C3-SP REAG RBC QL: NEGATIVE — SIGNIFICANT CHANGE UP
DAT POLY-SP REAG RBC QL: POSITIVE — SIGNIFICANT CHANGE UP
DIRECT COOMBS IGG: POSITIVE — SIGNIFICANT CHANGE UP
E CHAFFEENSIS IGG TITR SER IF: SIGNIFICANT CHANGE UP TITER
ELUATE ANTIBODY 1: SIGNIFICANT CHANGE UP
FUNGITELL: 147 PG/ML — HIGH
GLUCOSE SERPL-MCNC: 157 MG/DL — HIGH (ref 70–99)
HAPTOGLOB SERPL-MCNC: 91 MG/DL — SIGNIFICANT CHANGE UP (ref 34–200)
HCT VFR BLD CALC: 24.8 % — LOW (ref 39–50)
HGB BLD-MCNC: 8 G/DL — LOW (ref 13–17)
LDH SERPL L TO P-CCNC: 223 U/L — SIGNIFICANT CHANGE UP (ref 50–242)
MCHC RBC-ENTMCNC: 29.6 PG — SIGNIFICANT CHANGE UP (ref 27–34)
MCHC RBC-ENTMCNC: 32.3 GM/DL — SIGNIFICANT CHANGE UP (ref 32–36)
MCV RBC AUTO: 91.9 FL — SIGNIFICANT CHANGE UP (ref 80–100)
NRBC # BLD: 0 /100 WBCS — SIGNIFICANT CHANGE UP (ref 0–0)
PLATELET # BLD AUTO: 322 K/UL — SIGNIFICANT CHANGE UP (ref 150–400)
POTASSIUM SERPL-MCNC: 4.7 MMOL/L — SIGNIFICANT CHANGE UP (ref 3.5–5.3)
POTASSIUM SERPL-SCNC: 4.7 MMOL/L — SIGNIFICANT CHANGE UP (ref 3.5–5.3)
RBC # BLD: 2.7 M/UL — LOW (ref 4.2–5.8)
RBC # BLD: 2.7 M/UL — LOW (ref 4.2–5.8)
RBC # FLD: 15.3 % — HIGH (ref 10.3–14.5)
RETICS #: 27.3 K/UL — SIGNIFICANT CHANGE UP (ref 25–125)
RETICS/RBC NFR: 1 % — SIGNIFICANT CHANGE UP (ref 0.5–2.5)
RH IG SCN BLD-IMP: POSITIVE — SIGNIFICANT CHANGE UP
SARS-COV-2 RNA SPEC QL NAA+PROBE: SIGNIFICANT CHANGE UP
SODIUM SERPL-SCNC: 135 MMOL/L — SIGNIFICANT CHANGE UP (ref 135–145)
TACROLIMUS SERPL-MCNC: 6.5 NG/ML — SIGNIFICANT CHANGE UP
WBC # BLD: 7.09 K/UL — SIGNIFICANT CHANGE UP (ref 3.8–10.5)
WBC # FLD AUTO: 7.09 K/UL — SIGNIFICANT CHANGE UP (ref 3.8–10.5)

## 2020-05-29 PROCEDURE — 99233 SBSQ HOSP IP/OBS HIGH 50: CPT | Mod: GC

## 2020-05-29 PROCEDURE — 99232 SBSQ HOSP IP/OBS MODERATE 35: CPT

## 2020-05-29 PROCEDURE — 86077 PHYS BLOOD BANK SERV XMATCH: CPT

## 2020-05-29 RX ORDER — EVEROLIMUS 10 MG/1
0.75 TABLET ORAL
Refills: 0 | Status: DISCONTINUED | OUTPATIENT
Start: 2020-05-29 | End: 2020-06-02

## 2020-05-29 RX ORDER — BLOOD-GLUCOSE METER
W/DEVICE KIT MISCELLANEOUS
Qty: 1 | Refills: 0 | Status: DISCONTINUED | COMMUNITY
Start: 2020-05-05 | End: 2020-05-29

## 2020-05-29 RX ORDER — ISOPROPYL ALCOHOL 0.7 ML/ML
SWAB TOPICAL
Qty: 1 | Refills: 5 | Status: DISCONTINUED | COMMUNITY
Start: 2020-05-05 | End: 2020-05-29

## 2020-05-29 RX ORDER — SULFAMETHOXAZOLE AND TRIMETHOPRIM 800; 160 MG/1; MG/1
800-160 TABLET ORAL
Qty: 30 | Refills: 5 | Status: DISCONTINUED | COMMUNITY
Start: 2020-05-05 | End: 2020-05-29

## 2020-05-29 RX ORDER — POSACONAZOLE 100 MG/1
300 TABLET, DELAYED RELEASE ORAL
Refills: 0 | Status: DISCONTINUED | OUTPATIENT
Start: 2020-05-29 | End: 2020-06-02

## 2020-05-29 RX ORDER — TACROLIMUS 5 MG/1
5 CAPSULE ORAL
Refills: 0 | Status: DISCONTINUED | OUTPATIENT
Start: 2020-05-29 | End: 2020-05-30

## 2020-05-29 RX ORDER — PEN NEEDLE, DIABETIC 32 GX 1/4"
32G X 6 MM NEEDLE, DISPOSABLE MISCELLANEOUS
Qty: 1 | Refills: 5 | Status: DISCONTINUED | COMMUNITY
Start: 2020-05-05 | End: 2020-05-29

## 2020-05-29 RX ORDER — BLOOD-GLUCOSE METER
KIT MISCELLANEOUS 4 TIMES DAILY
Qty: 1 | Refills: 5 | Status: DISCONTINUED | COMMUNITY
Start: 2020-05-05 | End: 2020-05-29

## 2020-05-29 RX ORDER — CALCIUM CITRATE/VITAMIN D3 315MG-6.25
315-6.25 TABLET ORAL
Qty: 240 | Refills: 5 | Status: DISCONTINUED | COMMUNITY
Start: 2020-05-29 | End: 2020-05-29

## 2020-05-29 RX ORDER — DOCUSATE SODIUM 100 MG/1
100 CAPSULE, LIQUID FILLED ORAL
Qty: 60 | Refills: 5 | Status: DISCONTINUED | COMMUNITY
Start: 2018-04-12 | End: 2020-05-29

## 2020-05-29 RX ORDER — LANCETS 33 GAUGE
EACH MISCELLANEOUS
Qty: 1 | Refills: 5 | Status: DISCONTINUED | COMMUNITY
Start: 2020-05-05 | End: 2020-05-29

## 2020-05-29 RX ADMIN — VALGANCICLOVIR 450 MILLIGRAM(S): 450 TABLET, FILM COATED ORAL at 07:57

## 2020-05-29 RX ADMIN — TACROLIMUS 5 MILLIGRAM(S): 5 CAPSULE ORAL at 20:02

## 2020-05-29 RX ADMIN — Medication 1 MILLIGRAM(S): at 07:54

## 2020-05-29 RX ADMIN — Medication 1 TABLET(S): at 12:46

## 2020-05-29 RX ADMIN — Medication 650 MILLIGRAM(S): at 20:03

## 2020-05-29 RX ADMIN — Medication 500000 UNIT(S): at 06:21

## 2020-05-29 RX ADMIN — Medication 70 MILLIGRAM(S): at 07:55

## 2020-05-29 RX ADMIN — Medication 500000 UNIT(S): at 12:18

## 2020-05-29 RX ADMIN — Medication 500000 UNIT(S): at 16:57

## 2020-05-29 RX ADMIN — Medication 650 MILLIGRAM(S): at 07:55

## 2020-05-29 RX ADMIN — Medication 2 TABLET(S): at 20:02

## 2020-05-29 RX ADMIN — MAGNESIUM OXIDE 400 MG ORAL TABLET 400 MILLIGRAM(S): 241.3 TABLET ORAL at 07:55

## 2020-05-29 RX ADMIN — EVEROLIMUS 0.75 MILLIGRAM(S): 10 TABLET ORAL at 20:34

## 2020-05-29 RX ADMIN — Medication 81 MILLIGRAM(S): at 12:17

## 2020-05-29 RX ADMIN — PANTOPRAZOLE SODIUM 40 MILLIGRAM(S): 20 TABLET, DELAYED RELEASE ORAL at 06:20

## 2020-05-29 RX ADMIN — Medication 20 MILLIGRAM(S): at 21:11

## 2020-05-29 RX ADMIN — TACROLIMUS 8 MILLIGRAM(S): 5 CAPSULE ORAL at 07:57

## 2020-05-29 RX ADMIN — SODIUM ZIRCONIUM CYCLOSILICATE 10 GRAM(S): 10 POWDER, FOR SUSPENSION ORAL at 16:57

## 2020-05-29 RX ADMIN — Medication 2 TABLET(S): at 07:54

## 2020-05-29 RX ADMIN — POSACONAZOLE 300 MILLIGRAM(S): 100 TABLET, DELAYED RELEASE ORAL at 20:03

## 2020-05-29 RX ADMIN — Medication 120 MILLIGRAM(S): at 06:21

## 2020-05-29 NOTE — DIETITIAN INITIAL EVALUATION ADULT. - PROBLEM SELECTOR PLAN 1
Hematology / oncology following  Transfuse with 2 units PRBC   check CBC with DIFF, CMP, fractionated bilirubin, LDH, haptoglobin, Jacky, MIGUELINA, RF, ANCA  CT Chest w/o contrast negative for any lymphadenopathy  Continue prednisone 70mg daily  Continue folic acid 1mg daily  Would start Bactrim DS on Mon/Wed/Fri for PCP prophylaxis as patient on steroids   Recommend ID consult - only viral PCR that was detectable from prior admission was CMV  Plan to get rituximab inpatient for tomorrow 5/23.

## 2020-05-29 NOTE — PROGRESS NOTE ADULT - PROBLEM SELECTOR PLAN 2
CHF following   Continue with  tacrolimus 8mg q12H;   Daily tacrolimus level  Continue Valcyte 450mg daily for CMV prophylaxis.  Continue Bactrim DS daily   Resume ASA 81mg daily once H/H stable. No active bleeding.  Continue MgOx for medication- induced hypomagnesemia.

## 2020-05-29 NOTE — PROGRESS NOTE ADULT - SUBJECTIVE AND OBJECTIVE BOX
INTERVAL HPI/OVERNIGHT EVENTS:  Patient seen at bedside. Bone marrow biopsy pending.     VITAL SIGNS:  T(F): 97.4 (05-29-20 @ 05:10)  HR: 104 (05-29-20 @ 05:10)  BP: 136/88 (05-29-20 @ 05:10)  RR: 18 (05-29-20 @ 05:10)  SpO2: 100% (05-29-20 @ 05:10)  Wt(kg): --    PHYSICAL EXAM:    Constitutional: NAD  Eyes: EOMI, sclera non-icteric  Neck: supple, no masses, no JVD  Respiratory: CTA b/l, good air entry b/l  Cardiovascular: RRR, no M/R/G  Gastrointestinal: soft, NTND  Extremities: no c/c/e  Neurological: AAOx3      MEDICATIONS  (STANDING):  aspirin enteric coated 81 milliGRAM(s) Oral daily  calcium citrate  1500 mG + Vitamin D (CITRACAL + D3 Maximum) 2 Tablet(s) Oral <User Schedule>  diltiazem    milliGRAM(s) Oral daily  folic acid 1 milliGRAM(s) Oral <User Schedule>  magnesium oxide 400 milliGRAM(s) Oral <User Schedule>  nystatin    Suspension 210250 Unit(s) Oral four times a day  pantoprazole    Tablet 40 milliGRAM(s) Oral before breakfast  pravastatin 20 milliGRAM(s) Oral at bedtime  predniSONE   Tablet 70 milliGRAM(s) Oral <User Schedule>  sodium bicarbonate 650 milliGRAM(s) Oral <User Schedule>  sodium zirconium cyclosilicate 10 Gram(s) Oral <User Schedule>  tacrolimus 8 milliGRAM(s) Oral <User Schedule>  trimethoprim   80 mG/sulfamethoxazole 400 mG 1 Tablet(s) Oral daily  valGANciclovir 450 milliGRAM(s) Oral <User Schedule>    MEDICATIONS  (PRN):  meperidine     Injectable 25 milliGRAM(s) IV Push once PRN Rigors      Allergies    No Known Allergies    Intolerances        LABS:                        8.0    7.09  )-----------( 322      ( 29 May 2020 07:42 )             24.8     05-29    135  |  101  |  39<H>  ----------------------------<  157<H>  4.7   |  25  |  1.47<H>    Ca    9.1      29 May 2020 07:42    TPro  6.3  /  Alb  3.7  /  TBili  0.9  /  DBili  x   /  AST  12  /  ALT  16  /  AlkPhos  78  05-28          RADIOLOGY & ADDITIONAL TESTS:  Studies reviewed.    ASSESSMENT & PLAN: INTERVAL HPI/OVERNIGHT EVENTS:  Patient seen at bedside. Bone marrow biopsy pending.     VITAL SIGNS:  T(F): 97.4 (05-29-20 @ 05:10)  HR: 104 (05-29-20 @ 05:10)  BP: 136/88 (05-29-20 @ 05:10)  RR: 18 (05-29-20 @ 05:10)  SpO2: 100% (05-29-20 @ 05:10)  Wt(kg): --    PHYSICAL EXAM:    Constitutional: NAD  Eyes: EOMI, sclera non-icteric  Neck: supple, no masses  Respiratory: CTA b/l  Cardiovascular: RRR, no M/R/G  Gastrointestinal: soft, NTND  Extremities: no c/c/e  Neurological: AAOx3      MEDICATIONS  (STANDING):  aspirin enteric coated 81 milliGRAM(s) Oral daily  calcium citrate  1500 mG + Vitamin D (CITRACAL + D3 Maximum) 2 Tablet(s) Oral <User Schedule>  diltiazem    milliGRAM(s) Oral daily  folic acid 1 milliGRAM(s) Oral <User Schedule>  magnesium oxide 400 milliGRAM(s) Oral <User Schedule>  nystatin    Suspension 091938 Unit(s) Oral four times a day  pantoprazole    Tablet 40 milliGRAM(s) Oral before breakfast  pravastatin 20 milliGRAM(s) Oral at bedtime  predniSONE   Tablet 70 milliGRAM(s) Oral <User Schedule>  sodium bicarbonate 650 milliGRAM(s) Oral <User Schedule>  sodium zirconium cyclosilicate 10 Gram(s) Oral <User Schedule>  tacrolimus 8 milliGRAM(s) Oral <User Schedule>  trimethoprim   80 mG/sulfamethoxazole 400 mG 1 Tablet(s) Oral daily  valGANciclovir 450 milliGRAM(s) Oral <User Schedule>    MEDICATIONS  (PRN):  meperidine     Injectable 25 milliGRAM(s) IV Push once PRN Rigors      Allergies    No Known Allergies    Intolerances        LABS:                        8.0    7.09  )-----------( 322      ( 29 May 2020 07:42 )             24.8     05-29    135  |  101  |  39<H>  ----------------------------<  157<H>  4.7   |  25  |  1.47<H>    Ca    9.1      29 May 2020 07:42    TPro  6.3  /  Alb  3.7  /  TBili  0.9  /  DBili  x   /  AST  12  /  ALT  16  /  AlkPhos  78  05-28          RADIOLOGY & ADDITIONAL TESTS:  Studies reviewed.    ASSESSMENT & PLAN:

## 2020-05-29 NOTE — DIETITIAN INITIAL EVALUATION ADULT. - OTHER INFO
Pt reports good appetite and intake PTA. Denies following certain modified diet; however notes avoids adding salt to foods and avoids sugar-sweetened beverages given high-dose steroids. Reports receives Meals on Wheels as well. NKFA. Denies PTA micronutrient supplementation at this time. Reports UBW of ~165 pounds and denies recent weight changes. Per previous RD note, pt weighed 164.5 pounds on 5/5, ~1 month ago. Current weight noted as 166.2 pounds on 5/28, standing weight, suggesting weight maintenance. Does endorse chewing difficulty 2/2 poor dentition; however, is tolerating in-house food well and denies need for texture-modified diet. Denies swallowing difficulty, nausea/vomiting, diarrhea/constipation. States last BM today. Reports good PO intake in-house, consuming 75% or more of meals. RD briefly reviewed sources of carbohydrates, portion sizes, well balance meals with protein and the importance of consistent eating pattern to help optimize glycemic control, written material offered and declined. Patient with no nutrition-related questions at this time. Made aware RD remains available as needed.    Skin per chart: intact  Edema per chart: none noted    Ht: 68 inches, Wt: 166.2 pounds (5/28), BMI: 25.2 kg/m2, IBW: 154 pounds (+/-10%), %IBW: 107%

## 2020-05-29 NOTE — DIETITIAN INITIAL EVALUATION ADULT. - PROBLEM SELECTOR PLAN 2
CHF following   Continue with  tacrolimus 8mg q12H; obtain tacrolimus level before 8am dose tomorrow.   ID consult to be called in AM   Continue Cellcept 500 mg BID   Continue Valcyte 450mg daily for CMV prophylaxis.  Continue Bactrim DS daily   Resume ASA 81mg daily once H/H stable. No active bleeding.  Continue MgOx for medication- induced hypomagnesemia.

## 2020-05-29 NOTE — PROGRESS NOTE ADULT - SUBJECTIVE AND OBJECTIVE BOX
Subjective: Pt states "Hello" denies any CP or SOB. No acute events overnight.     Telemetry:    Vital Signs Last 24 Hrs  T(C): 36.3 (05-29-20 @ 05:10), Max: 37 (05-28-20 @ 21:15)  T(F): 97.4 (05-29-20 @ 05:10), Max: 98.6 (05-28-20 @ 21:15)  HR: 104 (05-29-20 @ 05:10) (104 - 114)  BP: 136/88 (05-29-20 @ 05:10) (95/61 - 143/79)  RR: 18 (05-29-20 @ 05:10) (18 - 18)  SpO2: 100% (05-29-20 @ 05:10) (98% - 100%)             05-28 @ 07:01  -  05-29 @ 07:00  --------------------------------------------------------  IN: 1000 mL / OUT: 900 mL / NET: 100 mL                          8.0    7.09  )-----------( 322      ( 29 May 2020 07:42 )             24.8     135  |  101  |  39<H>  ----------------------------<  157<H>  4.7   |  25  |  1.47<H>          PHYSICAL EXAM  Neurology: A&Ox3, NAD  CV : RRR+S1S2  Lungs: Respirations non-labored, B/L BS CTA  Abdomen: Soft, NT/ND, +BSx4Q, +BM 5/28  : Voiding without difficulty  Extremities: B/L LE no edema, negative calf tenderness, +PP             MEDICATIONS  aspirin enteric coated 81 milliGRAM(s) Oral daily  calcium citrate  1500 mG + Vitamin D (CITRACAL + D3 Maximum) 2 Tablet(s) Oral <User Schedule>  diltiazem    milliGRAM(s) Oral daily  folic acid 1 milliGRAM(s) Oral <User Schedule>  magnesium oxide 400 milliGRAM(s) Oral <User Schedule>  meperidine     Injectable 25 milliGRAM(s) IV Push once PRN  nystatin    Suspension 707844 Unit(s) Oral four times a day  pantoprazole    Tablet 40 milliGRAM(s) Oral before breakfast  pravastatin 20 milliGRAM(s) Oral at bedtime  predniSONE   Tablet 70 milliGRAM(s) Oral <User Schedule>  sodium bicarbonate 650 milliGRAM(s) Oral <User Schedule>  sodium zirconium cyclosilicate 10 Gram(s) Oral <User Schedule>  tacrolimus 8 milliGRAM(s) Oral <User Schedule>  trimethoprim   80 mG/sulfamethoxazole 400 mG 1 Tablet(s) Oral daily  valGANciclovir 450 milliGRAM(s) Oral <User Schedule>        Discussed with Cardiothoracic Team at AM rounds. Subjective: Pt states "Hello" denies any CP or SOB. No acute events overnight.     Telemetry:  -120  Vital Signs Last 24 Hrs  T(C): 36.3 (05-29-20 @ 05:10), Max: 37 (05-28-20 @ 21:15)  T(F): 97.4 (05-29-20 @ 05:10), Max: 98.6 (05-28-20 @ 21:15)  HR: 104 (05-29-20 @ 05:10) (104 - 114)  BP: 136/88 (05-29-20 @ 05:10) (95/61 - 143/79)  RR: 18 (05-29-20 @ 05:10) (18 - 18)  SpO2: 100% (05-29-20 @ 05:10) (98% - 100%)             05-28 @ 07:01  -  05-29 @ 07:00  --------------------------------------------------------  IN: 1000 mL / OUT: 900 mL / NET: 100 mL                          8.0    7.09  )-----------( 322      ( 29 May 2020 07:42 )             24.8     135  |  101  |  39<H>  ----------------------------<  157<H>  4.7   |  25  |  1.47<H>          PHYSICAL EXAM  Neurology: A&Ox3, NAD  CV : RRR+S1S2  Lungs: Respirations non-labored, B/L BS CTA  Abdomen: Soft, NT/ND, +BSx4Q, +BM 5/28  : Voiding without difficulty  Extremities: B/L LE no edema, negative calf tenderness, +PP             MEDICATIONS  aspirin enteric coated 81 milliGRAM(s) Oral daily  calcium citrate  1500 mG + Vitamin D (CITRACAL + D3 Maximum) 2 Tablet(s) Oral <User Schedule>  diltiazem    milliGRAM(s) Oral daily  folic acid 1 milliGRAM(s) Oral <User Schedule>  magnesium oxide 400 milliGRAM(s) Oral <User Schedule>  meperidine     Injectable 25 milliGRAM(s) IV Push once PRN  nystatin    Suspension 887992 Unit(s) Oral four times a day  pantoprazole    Tablet 40 milliGRAM(s) Oral before breakfast  pravastatin 20 milliGRAM(s) Oral at bedtime  predniSONE   Tablet 70 milliGRAM(s) Oral <User Schedule>  sodium bicarbonate 650 milliGRAM(s) Oral <User Schedule>  sodium zirconium cyclosilicate 10 Gram(s) Oral <User Schedule>  tacrolimus 8 milliGRAM(s) Oral <User Schedule>  trimethoprim   80 mG/sulfamethoxazole 400 mG 1 Tablet(s) Oral daily  valGANciclovir 450 milliGRAM(s) Oral <User Schedule>        Discussed with Cardiothoracic Team at AM rounds.

## 2020-05-29 NOTE — PROGRESS NOTE ADULT - SUBJECTIVE AND OBJECTIVE BOX
INFECTIOUS DISEASES FOLLOW UP-- Sujey Lujan  [phone]    This is a follow up note for this  70yMale with  Other autoimmune hemolytic anemias  CBC improving counts      ROS:  CONSTITUTIONAL:  No fever, good appetite  CARDIOVASCULAR:  No chest pain or palpitations  RESPIRATORY:  No dyspnea  GASTROINTESTINAL:  No nausea, vomiting, diarrhea, or abdominal pain  GENITOURINARY:  No dysuria  NEUROLOGIC:  No headache,     Allergies    No Known Allergies    Intolerances        ANTIBIOTICS/RELEVANT:  antimicrobials  nystatin    Suspension 707178 Unit(s) Oral four times a day  trimethoprim   80 mG/sulfamethoxazole 400 mG 1 Tablet(s) Oral daily  valGANciclovir 450 milliGRAM(s) Oral <User Schedule>    immunologic:  everolimus (ZORTRESS) 0.75 milliGRAM(s) Oral <User Schedule>  tacrolimus 8 milliGRAM(s) Oral <User Schedule>    OTHER:  aspirin enteric coated 81 milliGRAM(s) Oral daily  calcium citrate  1500 mG + Vitamin D (CITRACAL + D3 Maximum) 2 Tablet(s) Oral <User Schedule>  diltiazem    milliGRAM(s) Oral daily  folic acid 1 milliGRAM(s) Oral <User Schedule>  magnesium oxide 400 milliGRAM(s) Oral <User Schedule>  meperidine     Injectable 25 milliGRAM(s) IV Push once PRN  pantoprazole    Tablet 40 milliGRAM(s) Oral before breakfast  pravastatin 20 milliGRAM(s) Oral at bedtime  predniSONE   Tablet 70 milliGRAM(s) Oral <User Schedule>  sodium bicarbonate 650 milliGRAM(s) Oral <User Schedule>  sodium zirconium cyclosilicate 10 Gram(s) Oral <User Schedule>      Objective:  Vital Signs Last 24 Hrs  T(C): 36.4 (29 May 2020 12:19), Max: 37 (28 May 2020 21:15)  T(F): 97.5 (29 May 2020 12:19), Max: 98.6 (28 May 2020 21:15)  HR: 118 (29 May 2020 12:19) (104 - 118)  BP: 122/76 (29 May 2020 12:19) (122/76 - 143/79)  BP(mean): --  RR: 18 (29 May 2020 12:19) (18 - 18)  SpO2: 99% (29 May 2020 12:19) (99% - 100%)    PHYSICAL EXAM:  Constitutional:no acute distress  Eyes:WALT, EOMI  Ear/Nose/Throat: no oral lesions, 	  Respiratory: clear BL  Cardiovascular: S1S2 sternotomy healed  Gastrointestinal:soft, (+) BS, no tenderness  Extremities:no e/e/c  No Lymphadenopathy  IV sites not inflammed.    LABS:                        8.0    7.09  )-----------( 322      ( 29 May 2020 07:42 )             24.8     05-29    135  |  101  |  39<H>  ----------------------------<  157<H>  4.7   |  25  |  1.47<H>    Ca    9.1      29 May 2020 07:42    TPro  6.3  /  Alb  3.7  /  TBili  0.9  /  DBili  x   /  AST  12  /  ALT  16  /  AlkPhos  78  05-28          MICROBIOLOGY:            RECENT CULTURES:  05-27 @ 10:16  .Blood  --  --  --    Babesia microti PCR  Results: NOT detected  ***************Result Note*************  The detection of Babesia microti by PCR has only been  validated for whole blood; this test has not been approved  by the US Food and Drug Administration (FDA). Performance  characteristics of this assay have been determined by  [facility]. The clinical significance  of results should be considered in conjunction with the  overall clinical presentation of the patient. Result is not  intended to be used as the sole means for clinical diagnosis  or patient management decisions.  One negative sample does not necessarily rule  out the presence of a parasitic infection.  --      RADIOLOGY & ADDITIONAL STUDIES:    < from: CT Chest No Cont (05.22.20 @ 20:29) >  IMPRESSION:     1.  No change in the right upper lobe linear opacity likely due to scarring or the additional bilateral linear opacities likely due to atelectasis or scarring.  2.  Emphysema.  3.  No enlarged chest lymph nodes.    < end of copied text >

## 2020-05-29 NOTE — PROGRESS NOTE ADULT - ASSESSMENT
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:  - underlying etiology unknown - infectious workup was negative on last admission (PCR negative: EBV, parvovirus, Hep B/C, HIV)  - peripheral flow cytometry 5/2: negative  - possibly related to medications (tacrolimus), but generally seen within 1 year of starting medication  - proving to be steroid refractory: has been on prednisone 70-75mg since 5/4 with continued decrease in hemoglobin  - no evidence of lymphadenopathy or malignancy on imaging  - continue prednisone 70mg daily  - continue folic acid 1mg daily  - would recommend switching bactrim for mepron for PCP ppx   - s/p C1 rituxan 5/23, next due today 5/28, awaiting discussion with Dr. Isaacs  - LDH and hapto normal which argues against active hemolysis. Unclear the reason for isolated anemia (looking back his hgb has remained 8-9 since 56434. 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:  - underlying etiology unknown - infectious workup was negative on last admission (PCR negative: EBV, parvovirus, Hep B/C, HIV)  - peripheral flow cytometry 5/2: negative  - possibly related to medications (tacrolimus), but generally seen within 1 year of starting medication  - proving to be steroid refractory: has been on prednisone 70-75mg since 5/4 with continued decrease in hemoglobin  - no evidence of lymphadenopathy or malignancy on imaging  - continue prednisone 70mg daily  - continue folic acid 1mg daily  - would recommend switching bactrim for mepron for PCP ppx   - s/p C1 rituxan 5/23, will hold off on C2 today per Dr. Isaacs's recommendations  - LDH and hapto normal which argues against active hemolysis. Unclear the reason for isolated anemia (looking back his hgb has remained 8-9 since 53973. 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:  - underlying etiology unknown - infectious workup was negative on last admission (PCR negative: EBV, parvovirus, Hep B/C, HIV)  - peripheral flow cytometry 5/2: negative  - possibly related to medications (tacrolimus), but generally seen within 1 year of starting medication  - proving to be steroid refractory: has been on prednisone 70-75mg since 5/4 with continued decrease in hemoglobin  - no evidence of lymphadenopathy or malignancy on imaging  - continue prednisone 70mg daily  - continue folic acid 1mg daily  - would recommend switching bactrim for mepron for PCP ppx   - s/p C1 rituxan 5/23, will hold off on C2 today per Dr. Isaacs's recommendations  - LDH and hapto normal which argues against active hemolysis. Unclear the reason for isolated anemia (looking back his hgb has remained 8-9 since 22739. 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 Isaacs's recommendations  - LDH and hapto normal which argues against active hemolysis. Unclear the reason for isolated anemia (looking back his hgb has remained 8-9 since 38880. Low retic raises concern for bone marrow suppression perhaps 2/2 immunosuppression vs underlying bone marrow etiology   - s/p bone marrow biopsy , pending results   - Discussed case with Dr. Santiago and Dr. Isaacs who recommend to give 1 unit of blood and to keep patient over the weekend until we have results from bone marrow biopsy and can better assess the cause for his anemia     [facility]  Hematology-Oncology PGY-5  [phone]

## 2020-05-29 NOTE — PROGRESS NOTE ADULT - ATTENDING COMMENTS
NABOR Baez is a 70 year old male on cardiac post transplantation service who has been receiving anti rejection medication. he developed a Jacky positive hemolytic anemia and was started on steroid therapy. He was also begun on rituximab treatment and he has received two doses including one dose 6 days ago for which he was admitted to hospital.  He has had a astable HGB during this week and his hemolysis laboratory studies are improved. I spoke with Dr Gibson today and she does not wish him to receive rituximab today as originally planned. We are awaiting bone marrow aspiration biopsy results. Patient is recommended to have a transfusion of one unit of packed cells and to remain on anti rejection medication until bone marrow results are returned

## 2020-05-29 NOTE — DIETITIAN INITIAL EVALUATION ADULT. - PERTINENT MEDS FT
MEDICATIONS  (STANDING):  aspirin enteric coated 81 milliGRAM(s) Oral daily  calcium citrate  1500 mG + Vitamin D (CITRACAL + D3 Maximum) 2 Tablet(s) Oral <User Schedule>  diltiazem    milliGRAM(s) Oral daily  folic acid 1 milliGRAM(s) Oral <User Schedule>  magnesium oxide 400 milliGRAM(s) Oral <User Schedule>  nystatin    Suspension [id number] Unit(s) Oral four times a day  pantoprazole    Tablet 40 milliGRAM(s) Oral before breakfast  pravastatin 20 milliGRAM(s) Oral at bedtime  predniSONE   Tablet 70 milliGRAM(s) Oral <User Schedule>  sodium bicarbonate 650 milliGRAM(s) Oral <User Schedule>  sodium zirconium cyclosilicate 10 Gram(s) Oral <User Schedule>  tacrolimus 8 milliGRAM(s) Oral <User Schedule>  trimethoprim   80 mG/sulfamethoxazole 400 mG 1 Tablet(s) Oral daily  valGANciclovir 450 milliGRAM(s) Oral <User Schedule>    MEDICATIONS  (PRN):  meperidine     Injectable 25 milliGRAM(s) IV Push once PRN Delphineors

## 2020-05-29 NOTE — PROGRESS NOTE ADULT - PROBLEM SELECTOR PLAN 1
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 Infectious work up has been negative. Reportedly refractory to steroids however hemolysis labs as outpatient were improving. Awaiting results of BM biopsy of which will dictate whether we should continue Rituxan   - Pancytopenia possibly 2/2 medication-induced BM suppression  - No evidence of hemolysis of on labs or PTLD on imaging  - Hematology following - F/u BM biopsy  - ID following - initial infection workup was negative on last admission (PCR negative: EBV, parvovirus, Hep B/C, HIV). PCR testing for babesia negative. Tick-borne panel negative  - If pt needs to stay on Rituxan, may need PICC line prior to discharge given difficulty w/ IV access as OP. Will need to weigh risk of infection given immunosuppressed state.   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 Infectious work up has been negative. Reportedly refractory to steroids however hemolysis labs as outpatient were improving. Awaiting results of BM biopsy of which will dictate whether we should continue Rituxan   - Pancytopenia possibly 2/2 medication-induced BM suppression  - No evidence of hemolysis of on labs or PTLD on imaging  - Hematology following - F/u BM biopsy  - ID following - initial infection workup was negative on last admission (PCR negative: EBV, parvovirus, Hep B/C, HIV). PCR testing for babesia negative. Tick-borne panel negative  - If pt needs to stay on Rituxan, would favor inpatient admission for future infusions rather than PICC line given risk of infection in immunosuppressed state  - Will get 1 U PRBC today  - Continue folic acid  - Continue prednisone 70 mg daily

## 2020-05-29 NOTE — PROGRESS NOTE ADULT - ASSESSMENT
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:  - underlying etiology unknown - initial infection workup was negative on last admission (PCR negative: EBV, parvovirus, Hep B/C, HIV)  -plt count improving on high dose steroids  - will consider other infectious etiologies tick borne illnesses Babesia PCR was negative, ehrlichia/Lyme (not usually a cause of anemia)- will send for  tick borne panel   Can also repeat EBV PCR, Parvovirus PCR if above studies are negative    # OI prophylaxis    Given the high dose steroids 70mg/day prednisone- He needs Bactrim or mepron for PJP prevention- receiving Bactrim SS/daily  Valganciclovir for CMV prevention- repeat CMV viral load 5/27 non det    Antifungal prophylaxis nystatin- send fungitell  5/21 result elevated at 137  Will discuss with Cardiology changing nystatin to Voriconazole or posaconazole and follow the fungitell level  Has a hx of nocardia- no cough or sputum production at this point      Gary Lujan MD  [phone]  After 5pm/weekends [phone]

## 2020-05-29 NOTE — PROGRESS NOTE ADULT - PROBLEM SELECTOR PLAN 1
Hematology / oncology following  Transfused with 2 units PRBC 5/22  Blood work completed with CBC with DIFF, CMP, fractionated bilirubin, LDH, haptoglobin, Jacky, MIGUELINA, RF, ANCA  CT Chest w/o contrast negative for any lymphadenopathy  Continue prednisone 70mg daily  Continue folic acid 1mg daily  Continue Bactrim daily  rituximab 5/23 given as per heme/onc- next dose 5/29 as per hemeonc

## 2020-05-29 NOTE — PROGRESS NOTE ADULT - PROBLEM SELECTOR PLAN 2
RHC and biopsy 2/20 normal hemodynamics and neg for rejection.  Hep C + treated (Hep C PCR 5/4 undetectable)  -Will transition tacrolimus to everolimus. F/u urine protein/creatinine ratio  -D/C cellcept as he will be on Rituxan, high dose prednisone and tacro  - Continue Valganciclovir 450mg daily for CMV prophylaxis. CMV PCR 5/2 <1.70. 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 F/u Fungitell  - Continue ASA 81mg daily. No active bleeding.  - Continue pravastatin 20mg bedtime  - Continue MgOx for medication- induced hypomagnesemia RHC and biopsy 2/20 normal hemodynamics and neg for rejection.  Hep C + treated (Hep C PCR 5/4 undetectable)  -Will transition tacrolimus to everolimus. Urine P/C ratio 0.2  -Cellcept d/c'd as he will be on high dose prednisone, everolimus and possibly rituxan   -Continue Valganciclovir 450mg daily for CMV prophylaxis. CMV PCR 5/2 <1.70. CMV PCR 5/29 undetectable  - Continue Bactrim SS daily for PJP PPx and Nocardia PPx (had h/o Nocardia in past when on Mepron which is not protective)  - Voriconazole of fungal ppx  - Continue ASA 81mg daily. No active bleeding.  - Continue pravastatin 20mg bedtime  - Continue MgOx for medication- induced hypomagnesemia

## 2020-05-29 NOTE — PROGRESS NOTE ADULT - SUBJECTIVE AND OBJECTIVE BOX
***Preliminary Note***    NOTE: All current Cardiology Service and Contact Information can be found at [email or web address], password: [email or web address]    Subjective: No acute events overnight.     Medications:  MEDICATIONS  (STANDING):  aspirin enteric coated 81 milliGRAM(s) Oral daily  calcium citrate  1500 mG + Vitamin D (CITRACAL + D3 Maximum) 2 Tablet(s) Oral <User Schedule>  diltiazem    milliGRAM(s) Oral daily  folic acid 1 milliGRAM(s) Oral <User Schedule>  magnesium oxide 400 milliGRAM(s) Oral <User Schedule>  nystatin    Suspension [id number] Unit(s) Oral four times a day  pantoprazole    Tablet 40 milliGRAM(s) Oral before breakfast  pravastatin 20 milliGRAM(s) Oral at bedtime  predniSONE   Tablet 70 milliGRAM(s) Oral <User Schedule>  sodium bicarbonate 650 milliGRAM(s) Oral <User Schedule>  sodium zirconium cyclosilicate 10 Gram(s) Oral <User Schedule>  tacrolimus 8 milliGRAM(s) Oral <User Schedule>  trimethoprim   80 mG/sulfamethoxazole 400 mG 1 Tablet(s) Oral daily  valGANciclovir 450 milliGRAM(s) Oral <User Schedule>    MEDICATIONS  (PRN):  meperidine     Injectable 25 milliGRAM(s) IV Push once PRN Rigors      Vitals:  Vital Signs Last 24 Hrs  T(C): 36.3 (29 May 2020 05:10), Max: 37 (28 May 2020 21:15)  T(F): 97.4 (29 May 2020 05:10), Max: 98.6 (28 May 2020 21:15)  HR: 104 (29 May 2020 05:10) (104 - 114)  BP: 136/88 (29 May 2020 05:10) (95/61 - 143/79)  BP(mean): --  RR: 18 (29 May 2020 05:10) (18 - 18)  SpO2: 100% (29 May 2020 05:10) (98% - 100%)    I&O's Summary    28 May 2020 07:01  -  29 May 2020 07:00  --------------------------------------------------------  IN: 1000 mL / OUT: 900 mL / NET: 100 mL      Daily Weight in k.3 (26 May 2020 07:50)      Physical Exam:  Appearance: No Acute Distress  HEENT: No JVD  Cardiovascular: IV/VI machine-like systolic murmur, regular rate and rhythm   Respiratory: Clear to auscultation bilaterally  Gastrointestinal: Soft, Non-tender, non-distended	  Skin: no skin lesions  Neurologic: Non-focal  Extremities: No LE edema, warm and well perfused  Psychiatry: A & O x 3, Mood & affect appropriate      Labs:                        7.7    6.49  )-----------( 253      ( 28 May 2020 07:45 )             23.5                           7.4    6.07  )-----------( 200      ( 27 May 2020 07:15 )             23.1           134<L>  |  101  |  36<H>  ----------------------------<  153<H>  4.9   |  24  |  1.43<H>    Ca    9.5      28 May 2020 07:45    TPro  6.3  /  Alb  3.7  /  TBili  0.9  /  DBili  x   /  AST  12  /  ALT  16  /  AlkPhos  78      134<L>  |  102  |  33<H>  ----------------------------<  121<H>  5.3   |  23  |  1.34<H>    Ca    9.2      27 May 2020 07:15    TPro  6.3  /  Alb  3.6  /  TBili  1.0  /  DBili  x   /  AST  12  /  ALT  16  /  AlkPhos  78        Reticulocyte Count (20 @ 07:15)    RBC Count: 2.50 M/uL    Reticulocyte Percent: 1.0 %    Absolute Reticulocytes: 24.0 K/uL  Lactate Dehydrogenase, Serum: 256 U/L (20 @ 08:29)  Haptoglobin, Serum: 83 mg/dL (20 @ 12:21) NOTE: All current Cardiology Service and Contact Information can be found at [email or web address], password: [email or web address]    Subjective: No acute events overnight. No complaints this morning. Awaiting results of BM biopsy.     Medications:  MEDICATIONS  (STANDING):  aspirin enteric coated 81 milliGRAM(s) Oral daily  calcium citrate  1500 mG + Vitamin D (CITRACAL + D3 Maximum) 2 Tablet(s) Oral <User Schedule>  diltiazem    milliGRAM(s) Oral daily  folic acid 1 milliGRAM(s) Oral <User Schedule>  magnesium oxide 400 milliGRAM(s) Oral <User Schedule>  nystatin    Suspension [id number] Unit(s) Oral four times a day  pantoprazole    Tablet 40 milliGRAM(s) Oral before breakfast  pravastatin 20 milliGRAM(s) Oral at bedtime  predniSONE   Tablet 70 milliGRAM(s) Oral <User Schedule>  sodium bicarbonate 650 milliGRAM(s) Oral <User Schedule>  sodium zirconium cyclosilicate 10 Gram(s) Oral <User Schedule>  tacrolimus 8 milliGRAM(s) Oral <User Schedule>  trimethoprim   80 mG/sulfamethoxazole 400 mG 1 Tablet(s) Oral daily  valGANciclovir 450 milliGRAM(s) Oral <User Schedule>    MEDICATIONS  (PRN):  meperidine     Injectable 25 milliGRAM(s) IV Push once PRN Rigors      Vitals:  Vital Signs Last 24 Hrs  T(C): 36.3 (29 May 2020 05:10), Max: 37 (28 May 2020 21:15)  T(F): 97.4 (29 May 2020 05:10), Max: 98.6 (28 May 2020 21:15)  HR: 104 (29 May 2020 05:10) (104 - 114)  BP: 136/88 (29 May 2020 05:10) (95/61 - 143/79)  BP(mean): --  RR: 18 (29 May 2020 05:10) (18 - 18)  SpO2: 100% (29 May 2020 05:10) (98% - 100%)    I&O's Summary    28 May 2020 07:01  -  29 May 2020 07:00  --------------------------------------------------------  IN: 1000 mL / OUT: 900 mL / NET: 100 mL      Daily Weight in k.3 (26 May 2020 07:50)    Physical Exam:  Appearance: No Acute Distress  HEENT: No JVD  Cardiovascular: IV/VI machine-like systolic murmur, regular rate and rhythm   Respiratory: Clear to auscultation bilaterally  Gastrointestinal: Soft, Non-tender, non-distended	  Skin: no skin lesions  Neurologic: Non-focal  Extremities: No LE edema, warm and well perfused  Psychiatry: A & O x 3, Mood & affect appropriate      Labs:                        8.0    7.09  )-----------( 322      ( 29 May 2020 07:42 )             24.8                           7.7    6.49  )-----------( 253      ( 28 May 2020 07:45 )             23.5     05    135  |  101  |  39<H>  ----------------------------<  157<H>  4.7   |  25  |  1.47<H>    Ca    9.1      29 May 2020 07:42    TPro  6.3  /  Alb  3.7  /  TBili  0.9  /  DBili  x   /  AST  12  /  ALT  16  /  AlkPhos  78      134<L>  |  101  |  36<H>  ----------------------------<  153<H>  4.9   |  24  |  1.43<H>    Ca    9.5      28 May 2020 07:45    TPro  6.3  /  Alb  3.7  /  TBili  0.9  /  DBili  x   /  AST  12  /  ALT  16  /  AlkPhos  78      Reticulocyte Count (20 @ 07:15)    RBC Count: 2.50 M/uL    Reticulocyte Percent: 1.0 %    Absolute Reticulocytes: 24.0 K/uL  Lactate Dehydrogenase, Serum: 256 U/L (20 @ 08:29)  Haptoglobin, Serum: 83 mg/dL (20 @ 12:21)    Fungitell (20 @ 11:14)    Fungitell: 147

## 2020-05-29 NOTE — PROGRESS NOTE ADULT - PROBLEM SELECTOR PLAN 3
Baseline ~ 1.4  - At baseline.   - Continue sodium bicarb  - Continue Lokelma    ***Full note to follow after discussion w/ attending***  - Counseling on low K diet- recommend nutrition eval  - Continue to monitor Baseline ~ 1.4  - At baseline.   - Continue sodium bicarb  - Continue Lokelma  - Counseling on low K diet- recommend nutrition eval  - Continue to monitor

## 2020-05-29 NOTE — DIETITIAN INITIAL EVALUATION ADULT. - REASON INDICATOR FOR ASSESSMENT
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 Admitted severely anemic for transfusion and Rituxam therapy. S/p bone marrow biopsy 5/27; pending results. Plan for Rituxin 5/29.

## 2020-05-29 NOTE — PROGRESS NOTE ADULT - ASSESSMENT
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 Presented today to [facility] for IV Rituxan therapy. But unable to obtain intravenous access despite multiple attempts. Patient was also found to be severely anemic with a hemoglobin 6.1 and was sent in to [facility] ER for PRBC transfusion and to initiate Rituxan therapy. In ER Occult stool negative. COVID-19 PCR negative. Noted a low grade temp(100.4).   Hospital Course:   Admitted to 2 [facility] Telemetry floor. Awaiting Type and Screen. 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 Infectious work up has been negative. Refractory to steroids  Plan to start Rituxan therapy in AM. CHF following  5/23 VSS; guaiac neg; 2 units PRBC transfused overnight; heme/onc following; Rituxan given today as per heme/onc. Tacro level 6.1- continue tacro 8 bid   5/24 VSS 7.7/24.2. CT Chest No Cont (05.22.20 @ 20:29) No change in the right upper lobe linear opacity likely due to scarring or the additional bilateral linear opacities likely due to atelectasis or scarring. Emphysema. No enlarged chest lymph nodes.  5/25 VSS, H/H stable 7.8/24.3 today. Tacrolimus level 7.5  5/26 hemodynamics stable Tacro Level 7.9. Lokelma daily for ^ K. Valcyte changed daily   5/27 VSS; tacro level today 7.9; Rituxan as per hemeonc on 5/29. s/p bone marrow biopsy today, pending results.   5/28 VVS; Continue with current medication regimen. Plan for Rituxin per heme in AM.   5/29 VSS, Tacro level pending. Rituxan dose today by heme. Awaiting results of BM biopsy to determine course of Rituxan. 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 Presented today to [facility] for IV Rituxan therapy. But unable to obtain intravenous access despite multiple attempts. Patient was also found to be severely anemic with a hemoglobin 6.1 and was sent in to [facility] ER for PRBC transfusion and to initiate Rituxan therapy. In ER Occult stool negative. COVID-19 PCR negative. Noted a low grade temp(100.4).   Hospital Course:   Admitted to 2 [facility] Telemetry floor. Awaiting Type and Screen. 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 Infectious work up has been negative. Refractory to steroids  Plan to start Rituxan therapy in AM. CHF following  5/23 VSS; guaiac neg; 2 units PRBC transfused overnight; heme/onc following; Rituxan given today as per heme/onc. Tacro level 6.1- continue tacro 8 bid   5/24 VSS 7.7/24.2. CT Chest No Cont (05.22.20 @ 20:29) No change in the right upper lobe linear opacity likely due to scarring or the additional bilateral linear opacities likely due to atelectasis or scarring. Emphysema. No enlarged chest lymph nodes.  5/25 VSS, H/H stable 7.8/24.3 today. Tacrolimus level 7.5  5/26 hemodynamics stable Tacro Level 7.9. Lokelma daily for ^ K. Valcyte changed daily   5/27 VSS; tacro level today 7.9; Rituxan as per hemeonc on 5/29. s/p bone marrow biopsy today, pending results.   5/28 VVS; Continue with current medication regimen. Plan for Rituxin per heme in AM.   5/29 VSS, Tacro level 6.5. Rituxan dose today by heme. Awaiting results of BM biopsy to determine course of Rituxan.

## 2020-05-29 NOTE — DIETITIAN INITIAL EVALUATION ADULT. - ADD RECOMMEND
Continue Low Sodium diet. Reinforce nutrition education PRN. Monitor tolerance to diet prescription, nutritional intake, weight trends, labs and skin integrity.

## 2020-05-30 LAB
ANION GAP SERPL CALC-SCNC: 10 MMOL/L — SIGNIFICANT CHANGE UP (ref 5–17)
BUN SERPL-MCNC: 36 MG/DL — HIGH (ref 7–23)
CALCIUM SERPL-MCNC: 9.1 MG/DL — SIGNIFICANT CHANGE UP (ref 8.4–10.5)
CHLORIDE SERPL-SCNC: 100 MMOL/L — SIGNIFICANT CHANGE UP (ref 96–108)
CO2 SERPL-SCNC: 24 MMOL/L — SIGNIFICANT CHANGE UP (ref 22–31)
CREAT SERPL-MCNC: 1.38 MG/DL — HIGH (ref 0.5–1.3)
GLUCOSE SERPL-MCNC: 198 MG/DL — HIGH (ref 70–99)
HAPTOGLOB SERPL-MCNC: 81 MG/DL — SIGNIFICANT CHANGE UP (ref 34–200)
HCT VFR BLD CALC: 29.1 % — LOW (ref 39–50)
HGB BLD-MCNC: 9.3 G/DL — LOW (ref 13–17)
LDH SERPL L TO P-CCNC: 247 U/L — HIGH (ref 50–242)
MCHC RBC-ENTMCNC: 29.6 PG — SIGNIFICANT CHANGE UP (ref 27–34)
MCHC RBC-ENTMCNC: 32 GM/DL — SIGNIFICANT CHANGE UP (ref 32–36)
MCV RBC AUTO: 92.7 FL — SIGNIFICANT CHANGE UP (ref 80–100)
NRBC # BLD: 0 /100 WBCS — SIGNIFICANT CHANGE UP (ref 0–0)
PLATELET # BLD AUTO: 343 K/UL — SIGNIFICANT CHANGE UP (ref 150–400)
POTASSIUM SERPL-MCNC: 4.7 MMOL/L — SIGNIFICANT CHANGE UP (ref 3.5–5.3)
POTASSIUM SERPL-SCNC: 4.7 MMOL/L — SIGNIFICANT CHANGE UP (ref 3.5–5.3)
RBC # BLD: 3.14 M/UL — LOW (ref 4.2–5.8)
RBC # FLD: 14.9 % — HIGH (ref 10.3–14.5)
SODIUM SERPL-SCNC: 134 MMOL/L — LOW (ref 135–145)
TACROLIMUS SERPL-MCNC: 11.8 NG/ML — SIGNIFICANT CHANGE UP
WBC # BLD: 7.84 K/UL — SIGNIFICANT CHANGE UP (ref 3.8–10.5)
WBC # FLD AUTO: 7.84 K/UL — SIGNIFICANT CHANGE UP (ref 3.8–10.5)

## 2020-05-30 PROCEDURE — 99232 SBSQ HOSP IP/OBS MODERATE 35: CPT

## 2020-05-30 PROCEDURE — 99233 SBSQ HOSP IP/OBS HIGH 50: CPT

## 2020-05-30 RX ORDER — TACROLIMUS 5 MG/1
4 CAPSULE ORAL
Refills: 0 | Status: DISCONTINUED | OUTPATIENT
Start: 2020-05-30 | End: 2020-05-31

## 2020-05-30 RX ORDER — ACETAMINOPHEN 500 MG
650 TABLET ORAL EVERY 6 HOURS
Refills: 0 | Status: DISCONTINUED | OUTPATIENT
Start: 2020-05-30 | End: 2020-06-02

## 2020-05-30 RX ADMIN — EVEROLIMUS 0.75 MILLIGRAM(S): 10 TABLET ORAL at 20:45

## 2020-05-30 RX ADMIN — Medication 500000 UNIT(S): at 00:30

## 2020-05-30 RX ADMIN — Medication 650 MILLIGRAM(S): at 22:54

## 2020-05-30 RX ADMIN — Medication 70 MILLIGRAM(S): at 07:56

## 2020-05-30 RX ADMIN — Medication 20 MILLIGRAM(S): at 20:49

## 2020-05-30 RX ADMIN — Medication 650 MILLIGRAM(S): at 20:45

## 2020-05-30 RX ADMIN — Medication 1 TABLET(S): at 11:19

## 2020-05-30 RX ADMIN — Medication 120 MILLIGRAM(S): at 06:29

## 2020-05-30 RX ADMIN — Medication 500000 UNIT(S): at 11:19

## 2020-05-30 RX ADMIN — MAGNESIUM OXIDE 400 MG ORAL TABLET 400 MILLIGRAM(S): 241.3 TABLET ORAL at 07:55

## 2020-05-30 RX ADMIN — Medication 650 MILLIGRAM(S): at 07:56

## 2020-05-30 RX ADMIN — SODIUM ZIRCONIUM CYCLOSILICATE 10 GRAM(S): 10 POWDER, FOR SUSPENSION ORAL at 15:26

## 2020-05-30 RX ADMIN — Medication 81 MILLIGRAM(S): at 11:25

## 2020-05-30 RX ADMIN — TACROLIMUS 5 MILLIGRAM(S): 5 CAPSULE ORAL at 07:56

## 2020-05-30 RX ADMIN — Medication 2 TABLET(S): at 07:54

## 2020-05-30 RX ADMIN — Medication 500000 UNIT(S): at 06:30

## 2020-05-30 RX ADMIN — POSACONAZOLE 300 MILLIGRAM(S): 100 TABLET, DELAYED RELEASE ORAL at 07:55

## 2020-05-30 RX ADMIN — VALGANCICLOVIR 450 MILLIGRAM(S): 450 TABLET, FILM COATED ORAL at 07:56

## 2020-05-30 RX ADMIN — Medication 1 MILLIGRAM(S): at 07:55

## 2020-05-30 RX ADMIN — EVEROLIMUS 0.75 MILLIGRAM(S): 10 TABLET ORAL at 07:54

## 2020-05-30 RX ADMIN — PANTOPRAZOLE SODIUM 40 MILLIGRAM(S): 20 TABLET, DELAYED RELEASE ORAL at 06:30

## 2020-05-30 RX ADMIN — Medication 2 TABLET(S): at 20:45

## 2020-05-30 RX ADMIN — TACROLIMUS 4 MILLIGRAM(S): 5 CAPSULE ORAL at 20:46

## 2020-05-30 NOTE — PROGRESS NOTE ADULT - PROBLEM SELECTOR PLAN 1
Hematology / oncology following  Transfused with 2 units PRBC 5/22  CBC with DIFF, CMP, fractionated bilirubin, LDH, haptoglobin, Jacky, MIGUELINA, RF, ANCA  CT Chest w/o contrast negative for any lymphadenopathy  Continue prednisone 70mg daily  Continue folic acid 1mg daily  Continue Bactrim daily  rituximab 5/23 given as per heme/onc-

## 2020-05-30 NOTE — PROGRESS NOTE ADULT - ASSESSMENT
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 COVID-19 PCR negative in ER. Beside low grade temp (100.4) in ER, no signs or symptoms of infection at this time. He reported mild dyspnea in the past few days prior to admission with walking around the house sometimes. He is satting well in RA. CBC w/ pancytopenia w/ low retic count and no e/o of hemolysis. Possibly related to valcyte + bactrim. No evidence of PTLD on imaging. He appears well, euvolemic, and nontoxic. Due to elevated Fungitell, placed on posaconazole; no signs of infection

## 2020-05-30 NOTE — PROGRESS NOTE ADULT - PROBLEM SELECTOR PLAN 3
Baseline ~ 1.4  - At baseline.   - Continue sodium bicarb  - Continue Lokelma  - Counseling on low K diet- recommend nutrition eval  - Continue to monitor

## 2020-05-30 NOTE — PROGRESS NOTE ADULT - SUBJECTIVE AND OBJECTIVE BOX
Interval History:  feels well  would like to go home when able  Hb remaining steady after transfusion yesterday    Medications:  acetaminophen   Tablet .. 650 milliGRAM(s) Oral every 6 hours PRN  aspirin enteric coated 81 milliGRAM(s) Oral daily  calcium citrate  1500 mG + Vitamin D (CITRACAL + D3 Maximum) 2 Tablet(s) Oral <User Schedule>  everolimus (ZORTRESS) 0.75 milliGRAM(s) Oral <User Schedule>  folic acid 1 milliGRAM(s) Oral <User Schedule>  magnesium oxide 400 milliGRAM(s) Oral <User Schedule>  meperidine     Injectable 25 milliGRAM(s) IV Push once PRN  pantoprazole    Tablet 40 milliGRAM(s) Oral before breakfast  posaconazole DR Tablet 300 milliGRAM(s) Oral <User Schedule>  pravastatin 20 milliGRAM(s) Oral at bedtime  predniSONE   Tablet 70 milliGRAM(s) Oral <User Schedule>  sodium bicarbonate 650 milliGRAM(s) Oral <User Schedule>  sodium zirconium cyclosilicate 10 Gram(s) Oral <User Schedule>  tacrolimus 4 milliGRAM(s) Oral <User Schedule>  trimethoprim   80 mG/sulfamethoxazole 400 mG 1 Tablet(s) Oral daily  valGANciclovir 450 milliGRAM(s) Oral <User Schedule>      Vitals:  T(C): 36.6 (20 @ 20:13), Max: 36.6 (20 @ 04:48)  HR: 108 (20 @ 20:13) (103 - 113)  BP: 128/89 (20 @ 20:13) (111/75 - 140/94)  BP(mean): 109 (20 @ 04:48) (103 - 109)  RR: 18 (20 @ 20:13) (18 - 18)  SpO2: 99% (20 @ 20:13) (95% - 100%)    Daily     Daily Weight in k.5 (30 May 2020 08:32)        I&O's Summary    29 May 2020 07:01  -  30 May 2020 07:00  --------------------------------------------------------  IN: 1290 mL / OUT: 1350 mL / NET: -60 mL    30 May 2020 07:01  -  30 May 2020 23:16  --------------------------------------------------------  IN: 840 mL / OUT: 700 mL / NET: 140 mL      Physical Exam:  Appearance: No Acute Distress  HEENT: PERRL  Neck: No JVD  Cardiovascular: Normal S1 S2, machine like murmur best in LLSB  Respiratory: Clear to auscultation bilaterally  Gastrointestinal: Soft, Non-tender	  Skin: No cyanosis	  Neurologic: Non-focal  Extremities: No LE edema  Psychiatry: A & O x 3, Mood & affect appropriate    Labs:                        9.3    7.84  )-----------( 343      ( 30 May 2020 07:39 )             29.1     05-30    134<L>  |  100  |  36<H>  ----------------------------<  198<H>  4.7   |  24  |  1.38<H>    Ca    9.1      30 May 2020 07:39

## 2020-05-30 NOTE — PROGRESS NOTE ADULT - PROBLEM SELECTOR PLAN 1
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 Infectious work up has been negative. Reportedly refractory to steroids however hemolysis labs as outpatient were improving. Awaiting results of BM biopsy; no plan for further Rituxan as per heme  - Pancytopenia possibly 2/2 medication-induced BM suppression  - No evidence of hemolysis of on labs or PTLD on imaging  - Hematology following - F/u BM biopsy  - ID following - initial infection workup was negative on last admission (PCR negative: EBV, parvovirus, Hep B/C, HIV). PCR testing for babesia negative. Tick-borne panel negative  - Continue folic acid  - Continue prednisone 70 mg daily

## 2020-05-30 NOTE — PROGRESS NOTE ADULT - ASSESSMENT
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 Presented today to [facility] for IV Rituxan therapy. But unable to obtain intravenous access despite multiple attempts. Patient was also found to be severely anemic with a hemoglobin 6.1 and was sent in to [facility] ER for PRBC transfusion and to initiate Rituxan therapy. In ER Occult stool negative. COVID-19 PCR negative. Noted a low grade temp(100.4).   Hospital Course:   Admitted to 2 [facility] Telemetry floor. Awaiting Type and Screen. 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 Infectious work up has been negative. Refractory to steroids  Plan to start Rituxan therapy in AM. CHF following  5/23 VSS; guaiac neg; 2 units PRBC transfused overnight; heme/onc following; Rituxan given today as per heme/onc. Tacro level 6.1- continue tacro 8 bid   5/24 VSS 7.7/24.2. CT Chest No Cont (05.22.20 @ 20:29) No change in the right upper lobe linear opacity likely due to scarring or the additional bilateral linear opacities likely due to atelectasis or scarring. Emphysema. No enlarged chest lymph nodes.  5/25 VSS, H/H stable 7.8/24.3 today. Tacrolimus level 7.5  5/26 hemodynamics stable Tacro Level 7.9. Lokelma daily for ^ K. Valcyte changed daily   5/27 VSS; tacro level today 7.9; Rituxan as per hemeonc on 5/29. s/p bone marrow biopsy today, pending results.   5/28 VVS; Continue with current medication regimen. Plan for Rituxin per heme in AM.   5/29 VSS, Tacro level 6.5. Rituxan dose today by heme. Awaiting results of BM biopsy to determine course of Rituxan.   5/30 VSS. Tacro 11.8. Profraf 4mg BID. Pending BM biopsy results. Plan to d/c to home on Monday.

## 2020-05-30 NOTE — PROGRESS NOTE ADULT - PROBLEM SELECTOR PLAN 2
RHC and biopsy 2/20 normal hemodynamics and neg for rejection.  Hep C + treated (Hep C PCR 5/4 undetectable)  -transition tacrolimus to everolimus. Urine P/C ratio 0.2; started on everolimus 0.75 q12  -Cellcept d/c'd as he will be on high dose prednisone, everolimus and possibly rituxan   - d/c dilt; reduce tac to 4/4 givne addition of posaconazole; goal tac 6-8  -Continue Valganciclovir 450mg daily for CMV prophylaxis. CMV PCR 5/2 <1.70. 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 No active bleeding.  - Continue pravastatin 20mg bedtime  - Continue MgOx for medication- induced hypomagnesemia

## 2020-05-30 NOTE — PROGRESS NOTE ADULT - ASSESSMENT
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, HCV+ s/p Rx, prior antibody mediated rejection s/p IVIG/plasmapharesis/Rituximab, CKD (baseline Cr 1.4), with recent admission for hemolytic anemia from 4/29-5/7, anemia improved with high dose steroids    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:  - underlying etiology unknown - initial infection workup was negative on last admission (PCR negative: EBV, parvovirus, Hep B/C, HIV)  -plt count improving on high dose steroids  - will consider other infectious etiologies tick borne illnesses Babesia PCR was negative, ehrlichia/Lyme (not usually a cause of anemia)- will send for  tick borne panel   Can also repeat EBV PCR, Parvovirus PCR if above studies are negative    # OI prophylaxis    Given the high dose steroids 70mg/day prednisone- He needs Bactrim or mepron for PJP prevention- receiving Bactrim SS/daily  Valganciclovir for CMV prevention- repeat CMV viral load 5/27 non det    # elevated fungitell >100pg/ml  pt denies cough, HA, nasal congestion  will repeat fungitell  in the meantime- in the setting of high dose steroids and immune compromised state will change antifungal medication to posaconazole  will consider checking a head CT scan/sinus cuts      Gary Lujan MD  [phone]  After 5pm/weekends [phone]

## 2020-05-30 NOTE — PROGRESS NOTE ADULT - SUBJECTIVE AND OBJECTIVE BOX
INFECTIOUS DISEASES FOLLOW UP-- Sujey Lujan  [phone]    This is a follow up note for this  70yMale with s/p OHTx 2/18  Other autoimmune hemolytic anemias  no interval complaints      ROS:  CONSTITUTIONAL:  No fever, good appetite  CARDIOVASCULAR:  No chest pain or palpitations  RESPIRATORY:  No dyspnea  GASTROINTESTINAL:  No nausea, vomiting, diarrhea, or abdominal pain  GENITOURINARY:  No dysuria  NEUROLOGIC:  No headache,     Allergies    No Known Allergies    Intolerances        ANTIBIOTICS/RELEVANT:  antimicrobials  posaconazole DR Tablet 300 milliGRAM(s) Oral <User Schedule>  trimethoprim   80 mG/sulfamethoxazole 400 mG 1 Tablet(s) Oral daily  valGANciclovir 450 milliGRAM(s) Oral <User Schedule>    immunologic:  everolimus (ZORTRESS) 0.75 milliGRAM(s) Oral <User Schedule>  tacrolimus 4 milliGRAM(s) Oral <User Schedule>    OTHER:  aspirin enteric coated 81 milliGRAM(s) Oral daily  calcium citrate  1500 mG + Vitamin D (CITRACAL + D3 Maximum) 2 Tablet(s) Oral <User Schedule>  folic acid 1 milliGRAM(s) Oral <User Schedule>  magnesium oxide 400 milliGRAM(s) Oral <User Schedule>  meperidine     Injectable 25 milliGRAM(s) IV Push once PRN  pantoprazole    Tablet 40 milliGRAM(s) Oral before breakfast  pravastatin 20 milliGRAM(s) Oral at bedtime  predniSONE   Tablet 70 milliGRAM(s) Oral <User Schedule>  sodium bicarbonate 650 milliGRAM(s) Oral <User Schedule>  sodium zirconium cyclosilicate 10 Gram(s) Oral <User Schedule>      Objective:  Vital Signs Last 24 Hrs  T(C): 36.6 (30 May 2020 13:55), Max: 36.6 (29 May 2020 19:46)  T(F): 97.8 (30 May 2020 13:55), Max: 97.8 (29 May 2020 19:46)  HR: 103 (30 May 2020 13:55) (103 - 119)  BP: 111/75 (30 May 2020 13:55) (111/75 - 140/94)  BP(mean): 109 (30 May 2020 04:48) (103 - 109)  RR: 18 (30 May 2020 13:55) (18 - 18)  SpO2: 99% (30 May 2020 13:55) (95% - 100%)    PHYSICAL EXAM:  Constitutional:no acute distress  Eyes:WALT, EOMI  Ear/Nose/Throat: no oral lesions, 	  Respiratory: clear BL  Cardiovascular: S1S2  Gastrointestinal:soft, (+) BS, no tenderness  bone marrow bx site no erythema  Extremities:no e/e/c  No Lymphadenopathy  IV sites not inflammed.    LABS:                        9.3    7.84  )-----------( 343      ( 30 May 2020 07:39 )             29.1     05-30    134<L>  |  100  |  36<H>  ----------------------------<  198<H>  4.7   |  24  |  1.38<H>    Ca    9.1      30 May 2020 07:39            MICROBIOLOGY:    Fungitell: 147: Interpretation: The Fungitell assay does not detect certain fungal  species such as the genus Cryptococcus (Helder et al. 1991) which  produces very low levels of (1-3)-Beta-D-Glucan. The assay also does  not detect the Zygomycetes such as Absidia, Mucor and Rhizopus  (Zenobia et al. 1994) which are not known to produce  (1-3)-Beta-D-Glucan. In addition, the yeast phase of Blastomyces  dermatitidis produces little (1-3)-Beta-D-Glucan and may not be  detected by the assay (Zeinab et al. 2007).  Reference Range:  Less than 60 pg/mL. Glucan values of less than 60 pg/mL are  interpreted as negative.  Glucan values of 60 to 79 pg/mL are interpreted as indeterminate,  and suggest a possible fungal infection. Additional sampling and  testing of sera is required to interpret the results.  Glucan values of greater than or equal to 80 pg/mL are interpreted  as positive.  Due to the potential for environmental contamination when  transferred to pour-off tubes, which can lead to false positive  results, interpret positive results from samples provided in  pour-off tubes with caution.  Results should be used in conjunction  with clinical findings, and should not form the sole basis for a  diagnosis or treatment decision. The Fungitell test is approved or  cleared for in vitro diagnostic use by the U.[place] Food and Drug  Administration. Modifications to the approved package insert have  been made and the performance characteristics for these  modifications were determined by [facility].  Ifsample result is greater than 500 pg/mL, physician may order a  titer of the sample. Please contact [facility] if you would  like to order a retest of this sample to obtain an actual value.  Samples are held for 1 week after initial testing date.  ____________________________________________________________  Performed at:  [facility]  [address] [place] [address]  [phone]  : Alejandrina Serna PhD HCLD(ABB)  CLIA# [id number] pg/mL (05.27.20 @ 11:14)            RECENT CULTURES:  05-27 @ 10:16  .Blood  --  --  --    Babesia microti PCR  Results: NOT detected  ***************Result Note*************  The detection of Babesia microti by PCR has only been  validated for whole blood; this test has not been approved  by the US Food and Drug Administration (FDA). Performance  characteristics of this assay have been determined by  [facility]. The clinical significance  of results should be considered in conjunction with the  overall clinical presentation of the patient. Result is not  intended to be used as the sole means for clinical diagnosis  or patient management decisions.  One negative sample does not necessarily rule  out the presence of a parasitic infection.  --      RADIOLOGY & ADDITIONAL STUDIES:    < from: CT Chest No Cont (05.22.20 @ 20:29) >  IMPRESSION:     1.  No change in the right upper lobe linear opacity likely due to scarring or the additional bilateral linear opacities likely due to atelectasis or scarring.  2.  Emphysema.  3.  No enlarged chest lymph nodes.    < end of copied text >  < from: CT Abdomen and Pelvis w/ Oral Cont and w/ IV Cont (04.30.20 @ 19:32) >  IMPRESSION:     Cholelithiasis without any findings to suggest acute cholecystitis or any biliary obstruction.    Peristalsis versus mural thickening gastric antrum.    Additional findings as above.    < end of copied text >

## 2020-05-30 NOTE — PROGRESS NOTE ADULT - SUBJECTIVE AND OBJECTIVE BOX
VITAL SIGNS    Telemetry:  SR/  Vital Signs Last 24 Hrs  T(C): 36.6 (20 @ 13:55), Max: 36.6 (20 @ 19:46)  T(F): 97.8 (20 @ 13:55), Max: 97.8 (20 @ 19:46)  HR: 103 (20 @ 13:55) (103 - 119)  BP: 111/75 (20 @ 13:55) (111/75 - 140/94)  RR: 18 (20 @ 13:55) (18 - 18)  SpO2: 99% (20 @ 13:55) (95% - 100%)             @ 07:01  -   @ 07:00  --------------------------------------------------------  IN: 1290 mL / OUT: 1350 mL / NET: -60 mL     @ 07:01  -   @ 13:58  --------------------------------------------------------  IN: 600 mL / OUT: 400 mL / NET: 200 mL       Daily     Daily Weight in k.5 (30 May 2020 08:32)  Admit Wt: Drug Dosing Weight  Height (cm): 172.72 (22 May 2020 13:07)  Weight (kg): 77.1 (22 May 2020 13:07)  BMI (kg/m2): 25.8 (22 May 2020 13:07)  BSA (m2): 1.91 (22 May 2020 13:07)      CAPILLARY BLOOD GLUCOSE              MEDICATIONS  aspirin enteric coated 81 milliGRAM(s) Oral daily  calcium citrate  1500 mG + Vitamin D (CITRACAL + D3 Maximum) 2 Tablet(s) Oral <User Schedule>  everolimus (ZORTRESS) 0.75 milliGRAM(s) Oral <User Schedule>  folic acid 1 milliGRAM(s) Oral <User Schedule>  magnesium oxide 400 milliGRAM(s) Oral <User Schedule>  meperidine     Injectable 25 milliGRAM(s) IV Push once PRN  pantoprazole    Tablet 40 milliGRAM(s) Oral before breakfast  posaconazole DR Tablet 300 milliGRAM(s) Oral <User Schedule>  pravastatin 20 milliGRAM(s) Oral at bedtime  predniSONE   Tablet 70 milliGRAM(s) Oral <User Schedule>  sodium bicarbonate 650 milliGRAM(s) Oral <User Schedule>  sodium zirconium cyclosilicate 10 Gram(s) Oral <User Schedule>  tacrolimus 4 milliGRAM(s) Oral <User Schedule>  trimethoprim   80 mG/sulfamethoxazole 400 mG 1 Tablet(s) Oral daily  valGANciclovir 450 milliGRAM(s) Oral <User Schedule>      Interval History: Pt denies any complaints at this time. No acute events overnight    PHYSICAL EXAM  General: WN, WD, NAD  Neurology: alert and oriented x 3, nonfocal, no gross deficits  CV : s1, s2  Sternal Wound :  CDI , Stable  Lungs: CTA B/L  Abdomen: soft, NT,ND, (+ )Bowel sounds  :  voiding  Extremities: Neg calve tenderness b/l. PP 2+ B/L      LABS  -    134<L>  |  100  |  36<H>  ----------------------------<  198<H>  4.7   |  24  |  1.38<H>    Ca    9.1      30 May 2020 07:39                                   9.3    7.84  )-----------( 343      ( 30 May 2020 07:39 )             29.1                 PAST MEDICAL & SURGICAL HISTORY:  H/O autoimmune hemolytic anemia  Knee pain, right  HLD (hyperlipidemia)  Former smoker  DVT of upper extremity (deep vein thrombosis)  Hepatitis C virus  GIB (gastrointestinal bleeding)  Ventricular fibrillation: s/p AICD  PAF (paroxysmal atrial fibrillation): on xarelto  Non-Ischemic Cardiomyopathy  SVT (Supraventricular Tachycardia)  HTN  CHF (Congestive Heart Failure)  S/P right heart catheterization: biopsy multiple  H/O heart transplant: 2018  Status post left hip replacement  History of Prior Ablation Treatment: for afib  AICD (Automatic Cardioverter/Defibrillator) Present: [employer] with 1 [employer] lead09- explanted and replaced with [employer] 2 leads on 09

## 2020-05-31 LAB
ALBUMIN SERPL ELPH-MCNC: 3.7 G/DL — SIGNIFICANT CHANGE UP (ref 3.3–5)
ALP SERPL-CCNC: 83 U/L — SIGNIFICANT CHANGE UP (ref 40–120)
ALT FLD-CCNC: 20 U/L — SIGNIFICANT CHANGE UP (ref 10–45)
ANION GAP SERPL CALC-SCNC: 11 MMOL/L — SIGNIFICANT CHANGE UP (ref 5–17)
AST SERPL-CCNC: 15 U/L — SIGNIFICANT CHANGE UP (ref 10–40)
BILIRUB SERPL-MCNC: 1.7 MG/DL — HIGH (ref 0.2–1.2)
BUN SERPL-MCNC: 41 MG/DL — HIGH (ref 7–23)
CALCIUM SERPL-MCNC: 8.9 MG/DL — SIGNIFICANT CHANGE UP (ref 8.4–10.5)
CHLORIDE SERPL-SCNC: 100 MMOL/L — SIGNIFICANT CHANGE UP (ref 96–108)
CO2 SERPL-SCNC: 24 MMOL/L — SIGNIFICANT CHANGE UP (ref 22–31)
CREAT SERPL-MCNC: 1.45 MG/DL — HIGH (ref 0.5–1.3)
GLUCOSE SERPL-MCNC: 181 MG/DL — HIGH (ref 70–99)
HAPTOGLOB SERPL-MCNC: 74 MG/DL — SIGNIFICANT CHANGE UP (ref 34–200)
HCT VFR BLD CALC: 26.9 % — LOW (ref 39–50)
HGB BLD-MCNC: 9 G/DL — LOW (ref 13–17)
LDH SERPL L TO P-CCNC: 246 U/L — HIGH (ref 50–242)
MCHC RBC-ENTMCNC: 30.1 PG — SIGNIFICANT CHANGE UP (ref 27–34)
MCHC RBC-ENTMCNC: 33.5 GM/DL — SIGNIFICANT CHANGE UP (ref 32–36)
MCV RBC AUTO: 90 FL — SIGNIFICANT CHANGE UP (ref 80–100)
NRBC # BLD: 0 /100 WBCS — SIGNIFICANT CHANGE UP (ref 0–0)
PLATELET # BLD AUTO: 389 K/UL — SIGNIFICANT CHANGE UP (ref 150–400)
POTASSIUM SERPL-MCNC: 4.8 MMOL/L — SIGNIFICANT CHANGE UP (ref 3.5–5.3)
POTASSIUM SERPL-SCNC: 4.8 MMOL/L — SIGNIFICANT CHANGE UP (ref 3.5–5.3)
PROT SERPL-MCNC: 6.2 G/DL — SIGNIFICANT CHANGE UP (ref 6–8.3)
RBC # BLD: 2.99 M/UL — LOW (ref 4.2–5.8)
RBC # BLD: 3.03 M/UL — LOW (ref 4.2–5.8)
RBC # FLD: 14.6 % — HIGH (ref 10.3–14.5)
RETICS #: 42.4 K/UL — SIGNIFICANT CHANGE UP (ref 25–125)
RETICS/RBC NFR: 1.4 % — SIGNIFICANT CHANGE UP (ref 0.5–2.5)
SODIUM SERPL-SCNC: 135 MMOL/L — SIGNIFICANT CHANGE UP (ref 135–145)
TACROLIMUS SERPL-MCNC: 14.9 NG/ML — SIGNIFICANT CHANGE UP
WBC # BLD: 7.39 K/UL — SIGNIFICANT CHANGE UP (ref 3.8–10.5)
WBC # FLD AUTO: 7.39 K/UL — SIGNIFICANT CHANGE UP (ref 3.8–10.5)

## 2020-05-31 PROCEDURE — 99233 SBSQ HOSP IP/OBS HIGH 50: CPT

## 2020-05-31 RX ORDER — TACROLIMUS 5 MG/1
2 CAPSULE ORAL
Refills: 0 | Status: COMPLETED | OUTPATIENT
Start: 2020-05-31 | End: 2020-05-31

## 2020-05-31 RX ORDER — TACROLIMUS 5 MG/1
3 CAPSULE ORAL
Refills: 0 | Status: DISCONTINUED | OUTPATIENT
Start: 2020-06-01 | End: 2020-06-01

## 2020-05-31 RX ADMIN — Medication 1 MILLIGRAM(S): at 08:43

## 2020-05-31 RX ADMIN — Medication 70 MILLIGRAM(S): at 08:42

## 2020-05-31 RX ADMIN — POSACONAZOLE 300 MILLIGRAM(S): 100 TABLET, DELAYED RELEASE ORAL at 08:43

## 2020-05-31 RX ADMIN — Medication 2 TABLET(S): at 20:23

## 2020-05-31 RX ADMIN — PANTOPRAZOLE SODIUM 40 MILLIGRAM(S): 20 TABLET, DELAYED RELEASE ORAL at 08:43

## 2020-05-31 RX ADMIN — TACROLIMUS 2 MILLIGRAM(S): 5 CAPSULE ORAL at 20:22

## 2020-05-31 RX ADMIN — VALGANCICLOVIR 450 MILLIGRAM(S): 450 TABLET, FILM COATED ORAL at 08:43

## 2020-05-31 RX ADMIN — Medication 2 TABLET(S): at 08:43

## 2020-05-31 RX ADMIN — Medication 650 MILLIGRAM(S): at 14:46

## 2020-05-31 RX ADMIN — TACROLIMUS 4 MILLIGRAM(S): 5 CAPSULE ORAL at 08:44

## 2020-05-31 RX ADMIN — Medication 81 MILLIGRAM(S): at 08:43

## 2020-05-31 RX ADMIN — MAGNESIUM OXIDE 400 MG ORAL TABLET 400 MILLIGRAM(S): 241.3 TABLET ORAL at 08:43

## 2020-05-31 RX ADMIN — Medication 20 MILLIGRAM(S): at 21:58

## 2020-05-31 RX ADMIN — SODIUM ZIRCONIUM CYCLOSILICATE 10 GRAM(S): 10 POWDER, FOR SUSPENSION ORAL at 17:55

## 2020-05-31 RX ADMIN — EVEROLIMUS 0.75 MILLIGRAM(S): 10 TABLET ORAL at 20:23

## 2020-05-31 RX ADMIN — Medication 650 MILLIGRAM(S): at 20:23

## 2020-05-31 RX ADMIN — EVEROLIMUS 0.75 MILLIGRAM(S): 10 TABLET ORAL at 08:43

## 2020-05-31 RX ADMIN — Medication 1 TABLET(S): at 08:42

## 2020-05-31 RX ADMIN — Medication 650 MILLIGRAM(S): at 08:43

## 2020-05-31 NOTE — PROGRESS NOTE ADULT - PROBLEM SELECTOR PLAN 2
CHF following   Continue with  tacrolimus 8mg q12H;   Daily tacrolimus level 14.9  Continue Valcyte 450mg daily for CMV prophylaxis.  Continue Bactrim DS daily   Resume ASA 81mg daily once H/H stable. No active bleeding.  Continue MgOx for medication- induced hypomagnesemia.

## 2020-05-31 NOTE — PROGRESS NOTE ADULT - PROBLEM SELECTOR PLAN 2
RHC and biopsy 2/20 normal hemodynamics and neg for rejection.  Hep C + treated (Hep C PCR 5/4 undetectable)  -transition tacrolimus to everolimus. Urine P/C ratio 0.2; started on everolimus 0.75 q12  -Cellcept d/c'd as he will be on high dose prednisone, everolimus  - reduce tac to 3/3 given addition of posaconazole; goal tac 6-8  -Continue Valganciclovir 450mg daily for CMV prophylaxis. CMV PCR 5/2 <1.70. 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 No active bleeding.  - Continue pravastatin 20mg bedtime  - Continue MgOx for medication- induced hypomagnesemia

## 2020-05-31 NOTE — PROGRESS NOTE ADULT - SUBJECTIVE AND OBJECTIVE BOX
Subjective " Hello I am felling good today"     VITAL SIGNS    Telemetry:      Vital Signs Last 24 Hrs  T(C): 36.5 (05-31-20 @ 05:10), Max: 36.6 (05-30-20 @ 13:55)  T(F): 97.7 (05-31-20 @ 05:10), Max: 97.9 (05-30-20 @ 20:13)  HR: 101 (05-31-20 @ 05:10) (101 - 108)  BP: 129/87 (05-31-20 @ 05:10) (111/75 - 129/87)  RR: 18 (05-31-20 @ 05:10) (18 - 18)  SpO2: 99% (05-31-20 @ 05:10) (99% - 99%)           05-30 @ 07:01  -  05-31 @ 07:00  --------------------------------------------------------  IN: 840 mL / OUT: 1300 mL / NET: -460 mL    05-31 @ 07:01  -  05-31 @ 12:09  --------------------------------------------------------  IN: 320 mL / OUT: 0 mL / NET: 320 mL                        9.0    7.39  )-----------( 389      ( 31 May 2020 07:53 )             26.9   05-31    135  |  100  |  41<H>  ----------------------------<  181<H>  4.8   |  24  |  1.45<H>    Ca    8.9      31 May 2020 07:53    TPro  6.2  /  Alb  3.7  /  TBili  1.7<H>  /  DBili  x   /  AST  15  /  ALT  20  /  AlkPhos  83  05-31  Tacro level 14.9    MEDICATIONS  (STANDING):  aspirin enteric coated 81 milliGRAM(s) Oral daily  calcium citrate  1500 mG + Vitamin D (CITRACAL + D3 Maximum) 2 Tablet(s) Oral <User Schedule>  everolimus (ZORTRESS) 0.75 milliGRAM(s) Oral <User Schedule>  folic acid 1 milliGRAM(s) Oral <User Schedule>  magnesium oxide 400 milliGRAM(s) Oral <User Schedule>  pantoprazole    Tablet 40 milliGRAM(s) Oral before breakfast  posaconazole DR Tablet 300 milliGRAM(s) Oral <User Schedule>  pravastatin 20 milliGRAM(s) Oral at bedtime  predniSONE   Tablet 70 milliGRAM(s) Oral <User Schedule>  sodium bicarbonate 650 milliGRAM(s) Oral <User Schedule>  sodium zirconium cyclosilicate 10 Gram(s) Oral <User Schedule>  tacrolimus 4 milliGRAM(s) Oral <User Schedule>  trimethoprim   80 mG/sulfamethoxazole 400 mG 1 Tablet(s) Oral daily  valGANciclovir 450 milliGRAM(s) Oral <User Schedule>    MEDICATIONS  (PRN):  acetaminophen   Tablet .. 650 milliGRAM(s) Oral every 6 hours PRN Mild Pain (1 - 3)          PHYSICAL EXAM  Neurology: alert and oriented x 3, nonfocal, no gross deficits    CV :S1 S2 RRR    Sternal Wound :  NAZ sternum , Stable    Lungs: clear b/l breath sounds on room air    Abdomen: soft, nontender, nondistended, positive bowel sounds,     : Voids              Extremities:   equal strength throughout   b/l le warm well perfused + DP                                        Physical Therapy Rec:   Home  [  ]   Home w/ PT  [  ]  Rehab  [  ]    Discussed with Cardiothoracic Team at AM rounds. Subjective " Hello I am felling good today"     VITAL SIGNS    Telemetry:      Vital Signs Last 24 Hrs  T(C): 36.5 (05-31-20 @ 05:10), Max: 36.6 (05-30-20 @ 13:55)  T(F): 97.7 (05-31-20 @ 05:10), Max: 97.9 (05-30-20 @ 20:13)  HR: 101 (05-31-20 @ 05:10) (101 - 108)  BP: 129/87 (05-31-20 @ 05:10) (111/75 - 129/87)  RR: 18 (05-31-20 @ 05:10) (18 - 18)  SpO2: 99% (05-31-20 @ 05:10) (99% - 99%)           05-30 @ 07:01  -  05-31 @ 07:00  --------------------------------------------------------  IN: 840 mL / OUT: 1300 mL / NET: -460 mL    05-31 @ 07:01  -  05-31 @ 12:09  --------------------------------------------------------  IN: 320 mL / OUT: 0 mL / NET: 320 mL                        9.0    7.39  )-----------( 389      ( 31 May 2020 07:53 )             26.9   05-31    135  |  100  |  41<H>  ----------------------------<  181<H>  4.8   |  24  |  1.45<H>    Ca    8.9      31 May 2020 07:53    TPro  6.2  /  Alb  3.7  /  TBili  1.7<H>  /  DBili  x   /  AST  15  /  ALT  20  /  AlkPhos  83  05-31  Tacro level 14.9    MEDICATIONS  (STANDING):  aspirin enteric coated 81 milliGRAM(s) Oral daily  calcium citrate  1500 mG + Vitamin D (CITRACAL + D3 Maximum) 2 Tablet(s) Oral <User Schedule>  everolimus (ZORTRESS) 0.75 milliGRAM(s) Oral <User Schedule>  folic acid 1 milliGRAM(s) Oral <User Schedule>  magnesium oxide 400 milliGRAM(s) Oral <User Schedule>  pantoprazole    Tablet 40 milliGRAM(s) Oral before breakfast  posaconazole DR Tablet 300 milliGRAM(s) Oral <User Schedule>  pravastatin 20 milliGRAM(s) Oral at bedtime  predniSONE   Tablet 70 milliGRAM(s) Oral <User Schedule>  sodium bicarbonate 650 milliGRAM(s) Oral <User Schedule>  sodium zirconium cyclosilicate 10 Gram(s) Oral <User Schedule>  tacrolimus 4 milliGRAM(s) Oral <User Schedule>  trimethoprim   80 mG/sulfamethoxazole 400 mG 1 Tablet(s) Oral daily  valGANciclovir 450 milliGRAM(s) Oral <User Schedule>    MEDICATIONS  (PRN):  acetaminophen   Tablet .. 650 milliGRAM(s) Oral every 6 hours PRN Mild Pain (1 - 3)          PHYSICAL EXAM  Neurology: alert and oriented x 3, nonfocal, no gross deficits    CV :S1 S2 RRR    Sternal Wound :  NAZ sternum , Stable    Lungs: clear b/l breath sounds on room air    Abdomen: soft, nontender, nondistended, positive bowel sounds,     : Voids              Extremities:   equal strength throughout   b/l le warm well perfused + DP                                        Physical Therapy Rec:   Home  [ x ]   Home w/ PT  [  ]  Rehab  [  ]    Discussed with Cardiothoracic Team at AM rounds.

## 2020-05-31 NOTE — PROGRESS NOTE ADULT - SUBJECTIVE AND OBJECTIVE BOX
Interval History:  feels well    Medications:  acetaminophen   Tablet .. 650 milliGRAM(s) Oral every 6 hours PRN  aspirin enteric coated 81 milliGRAM(s) Oral daily  calcium citrate  1500 mG + Vitamin D (CITRACAL + D3 Maximum) 2 Tablet(s) Oral <User Schedule>  everolimus (ZORTRESS) 0.75 milliGRAM(s) Oral <User Schedule>  folic acid 1 milliGRAM(s) Oral <User Schedule>  magnesium oxide 400 milliGRAM(s) Oral <User Schedule>  pantoprazole    Tablet 40 milliGRAM(s) Oral before breakfast  posaconazole DR Tablet 300 milliGRAM(s) Oral <User Schedule>  pravastatin 20 milliGRAM(s) Oral at bedtime  predniSONE   Tablet 70 milliGRAM(s) Oral <User Schedule>  sodium bicarbonate 650 milliGRAM(s) Oral <User Schedule>  sodium zirconium cyclosilicate 10 Gram(s) Oral <User Schedule>  trimethoprim   80 mG/sulfamethoxazole 400 mG 1 Tablet(s) Oral daily  valGANciclovir 450 milliGRAM(s) Oral <User Schedule>      Vitals:  T(C): 36.5 (05-31-20 @ 19:52), Max: 36.5 (05-31-20 @ 05:10)  HR: 110 (05-31-20 @ 19:52) (101 - 112)  BP: 128/84 (05-31-20 @ 19:52) (121/86 - 129/87)  BP(mean): --  RR: 18 (05-31-20 @ 19:52) (18 - 18)  SpO2: 99% (05-31-20 @ 19:52) (99% - 100%)    Daily     Daily         I&O's Summary    30 May 2020 07:01  -  31 May 2020 07:00  --------------------------------------------------------  IN: 840 mL / OUT: 1300 mL / NET: -460 mL    31 May 2020 07:01  -  31 May 2020 21:09  --------------------------------------------------------  IN: 960 mL / OUT: 800 mL / NET: 160 mL    Physical Exam:  Appearance: No Acute Distress  HEENT: PERRL  Neck: No JVD  Cardiovascular: Normal S1 S2, machine-like murmur in LLSB  Respiratory: Clear to auscultation bilaterally  Gastrointestinal: Soft, Non-tender	  Skin: No cyanosis	  Neurologic: Non-focal  Extremities: No LE edema  Psychiatry: A & O x 3, Mood & affect appropriate    Labs:                        9.0    7.39  )-----------( 389      ( 31 May 2020 07:53 )             26.9     05-31    135  |  100  |  41<H>  ----------------------------<  181<H>  4.8   |  24  |  1.45<H>    Ca    8.9      31 May 2020 07:53    TPro  6.2  /  Alb  3.7  /  TBili  1.7<H>  /  DBili  x   /  AST  15  /  ALT  20  /  AlkPhos  83  05-31

## 2020-05-31 NOTE — PROGRESS NOTE ADULT - ASSESSMENT
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 Presented today to [facility] for IV Rituxan therapy. But unable to obtain intravenous access despite multiple attempts. Patient was also found to be severely anemic with a hemoglobin 6.1 and was sent in to [facility] ER for PRBC transfusion and to initiate Rituxan therapy. In ER Occult stool negative. COVID-19 PCR negative. Noted a low grade temp(100.4).   Hospital Course:   Admitted to 2 [facility] Telemetry floor. Awaiting Type and Screen. 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 Infectious work up has been negative. Refractory to steroids  Plan to start Rituxan therapy in AM. CHF following  5/23 VSS; guaiac neg; 2 units PRBC transfused overnight; heme/onc following; Rituxan given today as per heme/onc. Tacro level 6.1- continue tacro 8 bid   5/24 VSS 7.7/24.2. CT Chest No Cont (05.22.20 @ 20:29) No change in the right upper lobe linear opacity likely due to scarring or the additional bilateral linear opacities likely due to atelectasis or scarring. Emphysema. No enlarged chest lymph nodes.  5/25 VSS, H/H stable 7.8/24.3 today. Tacrolimus level 7.5  5/26 hemodynamics stable Tacro Level 7.9. Lokelma daily for ^ K. Valcyte changed daily   5/27 VSS; tacro level today 7.9; Rituxan as per hemeonc on 5/29. s/p bone marrow biopsy today, pending results.   5/28 VVS; Continue with current medication regimen. Plan for Rituxin per heme in AM.   5/29 VSS, Tacro level 6.5. Rituxan dose today by heme. Awaiting results of BM biopsy to determine course of Rituxan.   5/30 VSS. Tacro 11.8. Profraf 4mg BID. Pending BM biopsy results. Plan to d/c to home on Monday.  5/31 VSS Tacro 14 Prograf  4 mg bid Pending BM biopsy results . D/C home on Monday. 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 Presented today to [facility] for IV Rituxan therapy. But unable to obtain intravenous access despite multiple attempts. Patient was also found to be severely anemic with a hemoglobin 6.1 and was sent in to [facility] ER for PRBC transfusion and to initiate Rituxan therapy. In ER Occult stool negative. COVID-19 PCR negative. Noted a low grade temp(100.4).   Hospital Course:   Admitted to 2 [facility] Telemetry floor. Awaiting Type and Screen. 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 Infectious work up has been negative. Refractory to steroids  Plan to start Rituxan therapy in AM. CHF following  5/23 VSS; guaiac neg; 2 units PRBC transfused overnight; heme/onc following; Rituxan given today as per heme/onc. Tacro level 6.1- continue tacro 8 bid   5/24 VSS 7.7/24.2. CT Chest No Cont (05.22.20 @ 20:29) No change in the right upper lobe linear opacity likely due to scarring or the additional bilateral linear opacities likely due to atelectasis or scarring. Emphysema. No enlarged chest lymph nodes.  5/25 VSS, H/H stable 7.8/24.3 today. Tacrolimus level 7.5  5/26 hemodynamics stable Tacro Level 7.9. Lokelma daily for ^ K. Valcyte changed daily   5/27 VSS; tacro level today 7.9; Rituxan as per hemeonc on 5/29. s/p bone marrow biopsy today, pending results.   5/28 VVS; Continue with current medication regimen. Plan for Rituxin per heme in AM.   5/29 VSS, Tacro level 6.5. Rituxan dose today by heme. Awaiting results of BM biopsy to determine course of Rituxan.   5/30 VSS. Tacro 11.8. Profraf 4mg BID. Pending BM biopsy results. Plan to d/c to home on Monday.  5/31 VSS Tacro 14.9 Tacro 2 mg tonight at 8 pm Tacro  3 mg BID 6/1  Pending BM biopsy results . D/C home on Monday.

## 2020-05-31 NOTE — PROGRESS NOTE ADULT - PROBLEM SELECTOR PLAN 1
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 Infectious work up has been negative. Reportedly refractory to steroids however hemolysis labs as outpatient were improving. Awaiting results of BM biopsy; no plan for further Rituxan as per heme  - Pancytopenia possibly 2/2 medication-induced BM suppression  - No evidence of hemolysis of on labs or PTLD on imaging  - Hematology following - F/u BM biopsy  - ID following - initial infection workup was negative on last admission (PCR negative: EBV, parvovirus, Hep B/C, HIV). PCR testing for babesia negative. Tick-borne panel negative  - Continue folic acid  - Continue prednisone 70 mg daily (taper to be determined by heme)

## 2020-06-01 LAB
ALBUMIN SERPL ELPH-MCNC: 3.7 G/DL — SIGNIFICANT CHANGE UP (ref 3.3–5)
ALP SERPL-CCNC: 88 U/L — SIGNIFICANT CHANGE UP (ref 40–120)
ALT FLD-CCNC: 25 U/L — SIGNIFICANT CHANGE UP (ref 10–45)
ANION GAP SERPL CALC-SCNC: 9 MMOL/L — SIGNIFICANT CHANGE UP (ref 5–17)
AST SERPL-CCNC: 16 U/L — SIGNIFICANT CHANGE UP (ref 10–40)
BILIRUB SERPL-MCNC: 1.5 MG/DL — HIGH (ref 0.2–1.2)
BUN SERPL-MCNC: 50 MG/DL — HIGH (ref 7–23)
CALCIUM SERPL-MCNC: 9.1 MG/DL — SIGNIFICANT CHANGE UP (ref 8.4–10.5)
CHLORIDE SERPL-SCNC: 102 MMOL/L — SIGNIFICANT CHANGE UP (ref 96–108)
CO2 SERPL-SCNC: 25 MMOL/L — SIGNIFICANT CHANGE UP (ref 22–31)
CREAT SERPL-MCNC: 1.61 MG/DL — HIGH (ref 0.5–1.3)
GLUCOSE SERPL-MCNC: 212 MG/DL — HIGH (ref 70–99)
HAPTOGLOB SERPL-MCNC: 64 MG/DL — SIGNIFICANT CHANGE UP (ref 34–200)
HCT VFR BLD CALC: 27.1 % — LOW (ref 39–50)
HEMATOPATHOLOGY REPORT: SIGNIFICANT CHANGE UP
HGB BLD-MCNC: 8.7 G/DL — LOW (ref 13–17)
LDH SERPL L TO P-CCNC: 266 U/L — HIGH (ref 50–242)
MCHC RBC-ENTMCNC: 29.4 PG — SIGNIFICANT CHANGE UP (ref 27–34)
MCHC RBC-ENTMCNC: 32.1 GM/DL — SIGNIFICANT CHANGE UP (ref 32–36)
MCV RBC AUTO: 91.6 FL — SIGNIFICANT CHANGE UP (ref 80–100)
NRBC # BLD: 0 /100 WBCS — SIGNIFICANT CHANGE UP (ref 0–0)
PLATELET # BLD AUTO: 379 K/UL — SIGNIFICANT CHANGE UP (ref 150–400)
POTASSIUM SERPL-MCNC: 5.3 MMOL/L — SIGNIFICANT CHANGE UP (ref 3.5–5.3)
POTASSIUM SERPL-SCNC: 5.3 MMOL/L — SIGNIFICANT CHANGE UP (ref 3.5–5.3)
PROT SERPL-MCNC: 6.2 G/DL — SIGNIFICANT CHANGE UP (ref 6–8.3)
RBC # BLD: 2.96 M/UL — LOW (ref 4.2–5.8)
RBC # BLD: 2.96 M/UL — LOW (ref 4.2–5.8)
RBC # FLD: 14.7 % — HIGH (ref 10.3–14.5)
RETICS #: 52.7 K/UL — SIGNIFICANT CHANGE UP (ref 25–125)
RETICS/RBC NFR: 1.8 % — SIGNIFICANT CHANGE UP (ref 0.5–2.5)
SODIUM SERPL-SCNC: 136 MMOL/L — SIGNIFICANT CHANGE UP (ref 135–145)
TACROLIMUS SERPL-MCNC: 15.8 NG/ML — SIGNIFICANT CHANGE UP
TM INTERPRETATION: SIGNIFICANT CHANGE UP
WBC # BLD: 8.2 K/UL — SIGNIFICANT CHANGE UP (ref 3.8–10.5)
WBC # FLD AUTO: 8.2 K/UL — SIGNIFICANT CHANGE UP (ref 3.8–10.5)

## 2020-06-01 PROCEDURE — 99233 SBSQ HOSP IP/OBS HIGH 50: CPT | Mod: GC

## 2020-06-01 PROCEDURE — 99232 SBSQ HOSP IP/OBS MODERATE 35: CPT

## 2020-06-01 PROCEDURE — 99232 SBSQ HOSP IP/OBS MODERATE 35: CPT | Mod: GC

## 2020-06-01 RX ORDER — TACROLIMUS 5 MG/1
1 CAPSULE ORAL
Refills: 0 | Status: COMPLETED | OUTPATIENT
Start: 2020-06-01 | End: 2020-06-01

## 2020-06-01 RX ORDER — BACITRACIN ZINC 500 UNIT/G
1 OINTMENT IN PACKET (EA) TOPICAL
Refills: 0 | Status: DISCONTINUED | OUTPATIENT
Start: 2020-06-01 | End: 2020-06-02

## 2020-06-01 RX ORDER — TACROLIMUS 5 MG/1
1.5 CAPSULE ORAL
Refills: 0 | Status: DISCONTINUED | OUTPATIENT
Start: 2020-06-02 | End: 2020-06-02

## 2020-06-01 RX ORDER — DILTIAZEM HYDROCHLORIDE 120 MG/1
120 CAPSULE, EXTENDED RELEASE ORAL DAILY
Qty: 30 | Refills: 5 | Status: DISCONTINUED | COMMUNITY
Start: 2020-05-29 | End: 2020-06-01

## 2020-06-01 RX ORDER — TACROLIMUS 1 MG/1
1 CAPSULE ORAL TWICE DAILY
Qty: 180 | Refills: 5 | Status: DISCONTINUED | COMMUNITY
Start: 2020-05-06 | End: 2020-06-01

## 2020-06-01 RX ORDER — TACROLIMUS 1 MG/1
1 CAPSULE ORAL TWICE DAILY
Qty: 180 | Refills: 5 | Status: DISCONTINUED | COMMUNITY
Start: 2020-06-01 | End: 2020-06-01

## 2020-06-01 RX ORDER — CHOLECALCIFEROL (VITAMIN D3) 125 MCG
1000 CAPSULE ORAL DAILY
Refills: 0 | Status: DISCONTINUED | OUTPATIENT
Start: 2020-06-01 | End: 2020-06-02

## 2020-06-01 RX ORDER — CALCIUM CARBONATE/VITAMIN D3 500MG-5MCG
500-200 TABLET ORAL TWICE DAILY
Qty: 60 | Refills: 5 | Status: DISCONTINUED | COMMUNITY
Start: 2020-05-29 | End: 2020-06-01

## 2020-06-01 RX ADMIN — MAGNESIUM OXIDE 400 MG ORAL TABLET 400 MILLIGRAM(S): 241.3 TABLET ORAL at 08:14

## 2020-06-01 RX ADMIN — EVEROLIMUS 0.75 MILLIGRAM(S): 10 TABLET ORAL at 08:19

## 2020-06-01 RX ADMIN — Medication 650 MILLIGRAM(S): at 08:16

## 2020-06-01 RX ADMIN — POSACONAZOLE 300 MILLIGRAM(S): 100 TABLET, DELAYED RELEASE ORAL at 08:15

## 2020-06-01 RX ADMIN — TACROLIMUS 3 MILLIGRAM(S): 5 CAPSULE ORAL at 08:18

## 2020-06-01 RX ADMIN — TACROLIMUS 1 MILLIGRAM(S): 5 CAPSULE ORAL at 20:11

## 2020-06-01 RX ADMIN — Medication 1 MILLIGRAM(S): at 08:14

## 2020-06-01 RX ADMIN — Medication 1 APPLICATION(S): at 17:58

## 2020-06-01 RX ADMIN — PANTOPRAZOLE SODIUM 40 MILLIGRAM(S): 20 TABLET, DELAYED RELEASE ORAL at 08:14

## 2020-06-01 RX ADMIN — Medication 650 MILLIGRAM(S): at 20:12

## 2020-06-01 RX ADMIN — Medication 1 TABLET(S): at 11:12

## 2020-06-01 RX ADMIN — EVEROLIMUS 0.75 MILLIGRAM(S): 10 TABLET ORAL at 20:12

## 2020-06-01 RX ADMIN — Medication 2 TABLET(S): at 08:14

## 2020-06-01 RX ADMIN — Medication 70 MILLIGRAM(S): at 08:17

## 2020-06-01 RX ADMIN — Medication 20 MILLIGRAM(S): at 22:06

## 2020-06-01 RX ADMIN — Medication 650 MILLIGRAM(S): at 20:11

## 2020-06-01 RX ADMIN — VALGANCICLOVIR 450 MILLIGRAM(S): 450 TABLET, FILM COATED ORAL at 08:18

## 2020-06-01 RX ADMIN — Medication 1000 UNIT(S): at 11:13

## 2020-06-01 RX ADMIN — SODIUM ZIRCONIUM CYCLOSILICATE 10 GRAM(S): 10 POWDER, FOR SUSPENSION ORAL at 17:46

## 2020-06-01 RX ADMIN — Medication 81 MILLIGRAM(S): at 11:12

## 2020-06-01 NOTE — PROGRESS NOTE ADULT - ASSESSMENT
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 Presented today to [facility] for IV Rituxan therapy. But unable to obtain intravenous access despite multiple attempts. Patient was also found to be severely anemic with a hemoglobin 6.1 and was sent in to [facility] ER for PRBC transfusion and to initiate Rituxan therapy. In ER Occult stool negative. COVID-19 PCR negative. Noted a low grade temp(100.4).   Hospital Course:   Admitted to 2 [facility] Telemetry floor. Awaiting Type and Screen. 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 Infectious work up has been negative. Refractory to steroids  Plan to start Rituxan therapy in AM. CHF following  5/23 VSS; guaiac neg; 2 units PRBC transfused overnight; heme/onc following; Rituxan given today as per heme/onc. Tacro level 6.1- continue tacro 8 bid   5/24 VSS 7.7/24.2. CT Chest No Cont (05.22.20 @ 20:29) No change in the right upper lobe linear opacity likely due to scarring or the additional bilateral linear opacities likely due to atelectasis or scarring. Emphysema. No enlarged chest lymph nodes.  5/25 VSS, H/H stable 7.8/24.3 today. Tacrolimus level 7.5  5/26 hemodynamics stable Tacro Level 7.9. Lokelma daily for ^ K. Valcyte changed daily   5/27 VSS; tacro level today 7.9; Rituxan as per hemeonc on 5/29. s/p bone marrow biopsy today, pending results.   5/28 VVS; Continue with current medication regimen. Plan for Rituxin per heme in AM.   5/29 VSS, Tacro level 6.5. Rituxan dose today by heme. Awaiting results of BM biopsy to determine course of Rituxan.   5/30 VSS. Tacro 11.8. Profraf 4mg BID. Pending BM biopsy results. Plan to d/c to home on Monday.  5/31 VSS Tacro 14.9 Tacro 2 mg tonight at 8 pm Tacro  3 mg BID 6/1  Pending BM biopsy results . D/C home on Monday. 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 Presented today to [facility] for IV Rituxan therapy. But unable to obtain intravenous access despite multiple attempts. Patient was also found to be severely anemic with a hemoglobin 6.1 and was sent in to [facility] ER for PRBC transfusion and to initiate Rituxan therapy. In ER Occult stool negative. COVID-19 PCR negative. Noted a low grade temp(100.4).   Hospital Course:   Admitted to 2 [facility] Telemetry floor. Awaiting Type and Screen. 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 Infectious work up has been negative. Refractory to steroids  Plan to start Rituxan therapy in AM. CHF following  5/23 VSS; guaiac neg; 2 units PRBC transfused overnight; heme/onc following; Rituxan given today as per heme/onc. Tacro level 6.1- continue tacro 8 bid   5/24 VSS 7.7/24.2. CT Chest No Cont (05.22.20 @ 20:29) No change in the right upper lobe linear opacity likely due to scarring or the additional bilateral linear opacities likely due to atelectasis or scarring. Emphysema. No enlarged chest lymph nodes.  5/25 VSS, H/H stable 7.8/24.3 today. Tacrolimus level 7.5  5/26 hemodynamics stable Tacro Level 7.9. Lokelma daily for ^ K. Valcyte changed daily   5/27 VSS; tacro level today 7.9; Rituxan as per hemeonc on 5/29. s/p bone marrow biopsy today, pending results.   5/28 VVS; Continue with current medication regimen. Plan for Rituxin per heme in AM.   5/29 VSS, Tacro level 6.5. Rituxan dose today by heme. Awaiting results of BM biopsy to determine course of Rituxan.   5/30 VSS. Tacro 11.8. Profraf 4mg BID. Pending BM biopsy results.   5/31 VSS Tacro 14.9 Tacro 2 mg tonight at 8 pm Tacro  3 mg BID 6/1  Pending BM biopsy results  6/1 VSS; tacro level today 15.8; h/h 9/26 today; tacro dose adjusted to 1 mg this evening then 1.5 bid; discharge planning - await heme recs and posaconazole approval

## 2020-06-01 NOTE — PROGRESS NOTE ADULT - PROBLEM SELECTOR PLAN 2
CHF following   Continue with  tacrolimus 8mg q12H;   Daily tacrolimus level 14.9  Continue Valcyte 450mg daily for CMV prophylaxis.  Continue Bactrim DS daily   Resume ASA 81mg daily once H/H stable. No active bleeding.  Continue MgOx for medication- induced hypomagnesemia. CHF following   Daily tacrolimus level 15.8  Continue Valcyte 450mg daily for CMV prophylaxis.  Continue Bactrim DS daily   Resume ASA 81mg daily once H/H stable. No active bleeding.  Continue MgOx for medication- induced hypomagnesemia.  tacro dose adjusted to 1 mg this evening then 1.5 bid; discharge planning - await heme recs and posaconazole approval

## 2020-06-01 NOTE — PROGRESS NOTE ADULT - ATTENDING COMMENTS
Patient seen and examined with DR. duffy    I agree with her interval history exam and plans as noted above    Patient feeling well  on posaconazole for + fungitell- unclear if false or true positive  repeat fungitell sent and pending    Gary Lujan MD  [phone]  After 5pm/weekends [phone]

## 2020-06-01 NOTE — PROGRESS NOTE ADULT - SUBJECTIVE AND OBJECTIVE BOX
Interval history:      Current Meds:  acetaminophen   Tablet .. 650 milliGRAM(s) Oral every 6 hours PRN  aspirin enteric coated 81 milliGRAM(s) Oral daily  calcium citrate  1500 mG + Vitamin D (CITRACAL + D3 Maximum) 2 Tablet(s) Oral <User Schedule>  everolimus (ZORTRESS) 0.75 milliGRAM(s) Oral <User Schedule>  folic acid 1 milliGRAM(s) Oral <User Schedule>  magnesium oxide 400 milliGRAM(s) Oral <User Schedule>  pantoprazole    Tablet 40 milliGRAM(s) Oral before breakfast  posaconazole DR Tablet 300 milliGRAM(s) Oral <User Schedule>  pravastatin 20 milliGRAM(s) Oral at bedtime  predniSONE   Tablet 70 milliGRAM(s) Oral <User Schedule>  sodium bicarbonate 650 milliGRAM(s) Oral <User Schedule>  sodium zirconium cyclosilicate 10 Gram(s) Oral <User Schedule>  tacrolimus 3 milliGRAM(s) Oral <User Schedule>  trimethoprim   80 mG/sulfamethoxazole 400 mG 1 Tablet(s) Oral daily  valGANciclovir 450 milliGRAM(s) Oral <User Schedule>      Vitals:  T(C): 36.5 (06-01-20 @ 05:15), Max: 36.5 (05-31-20 @ 19:52)  T(F): 97.7 (06-01-20 @ 05:15), Max: 97.7 (05-31-20 @ 19:52)  HR: 111 (06-01-20 @ 05:15) (110 - 112)  BP: 130/86 (06-01-20 @ 05:15) (121/86 - 130/86)  RR: 18 (06-01-20 @ 05:15) (18 - 18)  SpO2: 100% (06-01-20 @ 05:15) (99% - 100%)      I&O's Summary    31 May 2020 07:01  -  01 Jun 2020 07:00  --------------------------------------------------------  IN: 1260 mL / OUT: 1800 mL / NET: -540 mL          Physical Exam:  Appearance: No Acute Distress  Neck: No JVD  Cardiovascular: Normal S1 S2, machine-like murmur in LLSB  Respiratory: Clear to auscultation bilaterally  Gastrointestinal: Soft, Non-tender	  Skin: No cyanosis	  Neurologic: Non-focal  Extremities: No LE edema  Psychiatry: A & O x 3, Mood & affect appropriate                          8.7    8.20  )-----------( 379      ( 01 Jun 2020 07:16 )             27.1     06-01    136  |  102  |  50<H>  ----------------------------<  212<H>  5.3   |  25  |  1.61<H>    Ca    9.1      01 Jun 2020 07:16    TPro  6.2  /  Alb  3.7  /  TBili  1.5<H>  /  DBili  x   /  AST  16  /  ALT  25  /  AlkPhos  88  06-01                  tacrolimus   4 milliGRAM(s) Oral (05-31-20 @ 08:44)   4 milliGRAM(s) Oral (05-30-20 @ 20:46)    tacrolimus   2 milliGRAM(s) Oral (05-31-20 @ 20:22)    tacrolimus   3 milliGRAM(s) Oral (06-01-20 @ 08:18) Interval history: No acute issues overnight.      Tele: SR       Current Meds:  acetaminophen   Tablet .. 650 milliGRAM(s) Oral every 6 hours PRN  aspirin enteric coated 81 milliGRAM(s) Oral daily  calcium citrate  1500 mG + Vitamin D (CITRACAL + D3 Maximum) 2 Tablet(s) Oral <User Schedule>  everolimus (ZORTRESS) 0.75 milliGRAM(s) Oral <User Schedule>  folic acid 1 milliGRAM(s) Oral <User Schedule>  magnesium oxide 400 milliGRAM(s) Oral <User Schedule>  pantoprazole    Tablet 40 milliGRAM(s) Oral before breakfast  posaconazole DR Tablet 300 milliGRAM(s) Oral <User Schedule>  pravastatin 20 milliGRAM(s) Oral at bedtime  predniSONE   Tablet 70 milliGRAM(s) Oral <User Schedule>  sodium bicarbonate 650 milliGRAM(s) Oral <User Schedule>  sodium zirconium cyclosilicate 10 Gram(s) Oral <User Schedule>  tacrolimus 3 milliGRAM(s) Oral <User Schedule>  trimethoprim   80 mG/sulfamethoxazole 400 mG 1 Tablet(s) Oral daily  valGANciclovir 450 milliGRAM(s) Oral <User Schedule>      Vitals:  T(C): 36.5 (06-01-20 @ 05:15), Max: 36.5 (05-31-20 @ 19:52)  T(F): 97.7 (06-01-20 @ 05:15), Max: 97.7 (05-31-20 @ 19:52)  HR: 111 (06-01-20 @ 05:15) (110 - 112)  BP: 130/86 (06-01-20 @ 05:15) (121/86 - 130/86)  RR: 18 (06-01-20 @ 05:15) (18 - 18)  SpO2: 100% (06-01-20 @ 05:15) (99% - 100%)      I&O's Summary    31 May 2020 07:01  -  01 Jun 2020 07:00  --------------------------------------------------------  IN: 1260 mL / OUT: 1800 mL / NET: -540 mL          Physical Exam:  Appearance: No Acute Distress  Neck: No JVD  Cardiovascular: Normal S1 S2, machine-like murmur in LLSB  Respiratory: Clear to auscultation bilaterally  Gastrointestinal: Soft, Non-tender	  Skin: No cyanosis	  Neurologic: Non-focal  Extremities: No LE edema  Psychiatry: A & O x 3, Mood & affect appropriate                          8.7    8.20  )-----------( 379      ( 01 Jun 2020 07:16 )             27.1     06-01    136  |  102  |  50<H>  ----------------------------<  212<H>  5.3   |  25  |  1.61<H>    Ca    9.1      01 Jun 2020 07:16    TPro  6.2  /  Alb  3.7  /  TBili  1.5<H>  /  DBili  x   /  AST  16  /  ALT  25  /  AlkPhos  88  06-01                  tacrolimus   4 milliGRAM(s) Oral (05-31-20 @ 08:44)   4 milliGRAM(s) Oral (05-30-20 @ 20:46)    tacrolimus   2 milliGRAM(s) Oral (05-31-20 @ 20:22)    tacrolimus   3 milliGRAM(s) Oral (06-01-20 @ 08:18)              Tacrolimus (), Serum: 15.8 ng/mL (06-01-20 @ 08:10)  Tacrolimus (), Serum: 14.9 ng/mL (05-31-20 @ 09:09)  Tacrolimus (), Serum: 11.8 ng/mL (05-30-20 @ 09:18)  Tacrolimus (), Serum: 6.5 ng/mL (05-29-20 @ 08:22)  Tacrolimus (), Serum: 7.8 ng/mL (05-28-20 @ 08:17)  Tacrolimus (), Serum: 7.9 ng/mL (05-27-20 @ 08:03)

## 2020-06-01 NOTE — PROGRESS NOTE ADULT - SUBJECTIVE AND OBJECTIVE BOX
VITAL SIGNS    Telemetry:    Vital Signs Last 24 Hrs  T(C): 36.5 (06-01-20 @ 05:15), Max: 36.5 (05-31-20 @ 19:52)  T(F): 97.7 (06-01-20 @ 05:15), Max: 97.7 (05-31-20 @ 19:52)  HR: 111 (06-01-20 @ 05:15) (110 - 112)  BP: 130/86 (06-01-20 @ 05:15) (121/86 - 130/86)  RR: 18 (06-01-20 @ 05:15) (18 - 18)  SpO2: 100% (06-01-20 @ 05:15) (99% - 100%)            05-31 @ 07:01  -  06-01 @ 07:00  --------------------------------------------------------  IN: 1260 mL / OUT: 1800 mL / NET: -540 mL       Daily     Daily   Admit Wt: Drug Dosing Weight  Height (cm): 172.72 (22 May 2020 13:07)  Weight (kg): 77.1 (22 May 2020 13:07)  BMI (kg/m2): 25.8 (22 May 2020 13:07)  BSA (m2): 1.91 (22 May 2020 13:07)    Bilirubin Total, Serum: 1.7 mg/dL (05-31 @ 07:53)    CAPILLARY BLOOD GLUCOSE              acetaminophen   Tablet .. 650 milliGRAM(s) Oral every 6 hours PRN  aspirin enteric coated 81 milliGRAM(s) Oral daily  calcium citrate  1500 mG + Vitamin D (CITRACAL + D3 Maximum) 2 Tablet(s) Oral <User Schedule>  everolimus (ZORTRESS) 0.75 milliGRAM(s) Oral <User Schedule>  folic acid 1 milliGRAM(s) Oral <User Schedule>  magnesium oxide 400 milliGRAM(s) Oral <User Schedule>  pantoprazole    Tablet 40 milliGRAM(s) Oral before breakfast  posaconazole DR Tablet 300 milliGRAM(s) Oral <User Schedule>  pravastatin 20 milliGRAM(s) Oral at bedtime  predniSONE   Tablet 70 milliGRAM(s) Oral <User Schedule>  sodium bicarbonate 650 milliGRAM(s) Oral <User Schedule>  sodium zirconium cyclosilicate 10 Gram(s) Oral <User Schedule>  tacrolimus 3 milliGRAM(s) Oral <User Schedule>  trimethoprim   80 mG/sulfamethoxazole 400 mG 1 Tablet(s) Oral daily  valGANciclovir 450 milliGRAM(s) Oral <User Schedule>      PHYSICAL EXAM    Subjective: "Hi.   Neurology: alert and oriented x 3, nonfocal, no gross deficits  CV : tele:  RSR  Sternal Wound :  CDI with dressing , Stable  Lungs: clear. RR easy, unlabored   Abdomen: soft, nontender, nondistended, positive bowel sounds, bowel movement   Neg N/V/D   :  pt voiding without difficulty   Extremities:   RUVALCABA; edema, neg calf tenderness.   PPP bilaterally      PW:  Chest tubes: VITAL SIGNS    Telemetry:  sr/ st 100-120   Vital Signs Last 24 Hrs  T(C): 36.5 (06-01-20 @ 05:15), Max: 36.5 (05-31-20 @ 19:52)  T(F): 97.7 (06-01-20 @ 05:15), Max: 97.7 (05-31-20 @ 19:52)  HR: 111 (06-01-20 @ 05:15) (110 - 112)  BP: 130/86 (06-01-20 @ 05:15) (121/86 - 130/86)  RR: 18 (06-01-20 @ 05:15) (18 - 18)  SpO2: 100% (06-01-20 @ 05:15) (99% - 100%)            05-31 @ 07:01  -  06-01 @ 07:00  --------------------------------------------------------  IN: 1260 mL / OUT: 1800 mL / NET: -540 mL       Daily     Daily   Admit Wt: Drug Dosing Weight  Height (cm): 172.72 (22 May 2020 13:07)  Weight (kg): 77.1 (22 May 2020 13:07)  BMI (kg/m2): 25.8 (22 May 2020 13:07)  BSA (m2): 1.91 (22 May 2020 13:07)    Bilirubin Total, Serum: 1.7 mg/dL (05-31 @ 07:53)    CAPILLARY BLOOD GLUCOSE              acetaminophen   Tablet .. 650 milliGRAM(s) Oral every 6 hours PRN  aspirin enteric coated 81 milliGRAM(s) Oral daily  calcium citrate  1500 mG + Vitamin D (CITRACAL + D3 Maximum) 2 Tablet(s) Oral <User Schedule>  everolimus (ZORTRESS) 0.75 milliGRAM(s) Oral <User Schedule>  folic acid 1 milliGRAM(s) Oral <User Schedule>  magnesium oxide 400 milliGRAM(s) Oral <User Schedule>  pantoprazole    Tablet 40 milliGRAM(s) Oral before breakfast  posaconazole DR Tablet 300 milliGRAM(s) Oral <User Schedule>  pravastatin 20 milliGRAM(s) Oral at bedtime  predniSONE   Tablet 70 milliGRAM(s) Oral <User Schedule>  sodium bicarbonate 650 milliGRAM(s) Oral <User Schedule>  sodium zirconium cyclosilicate 10 Gram(s) Oral <User Schedule>  tacrolimus 3 milliGRAM(s) Oral <User Schedule>  trimethoprim   80 mG/sulfamethoxazole 400 mG 1 Tablet(s) Oral daily  valGANciclovir 450 milliGRAM(s) Oral <User Schedule>      PHYSICAL EXAM    Subjective: "I'm ok."   Neurology: alert and oriented x 3, nonfocal, no gross deficits  CV : tele:  sr/ st 100-120   Sternal Wound :  CDI NAZ; sternum  Stable  Lungs: clear. RR easy, unlabored   Abdomen: soft, nontender, nondistended, positive bowel sounds, + bowel movement   Neg N/V/D   :  pt voiding without difficulty   Extremities:   RUVALCABA; neg LE edema, neg calf tenderness.   PPP bilaterally

## 2020-06-01 NOTE — PROGRESS NOTE ADULT - ASSESSMENT
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, HCV+ s/p Rx, prior antibody mediated rejection s/p IVIG/plasmapharesis/Rituximab, CKD (baseline Cr 1.4), with recent admission for hemolytic anemia from 4/29-5/7, anemia improved with high dose steroids    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 and warm antibody: babesia negative, EBV negative, HCV negative, CMV negative, parvovirus negative  - plt count improving on high dose steroids  - will consider other infectious etiologies tick borne illnesses ehrlichia/Lyme (not usually a cause of anemia)- will send for  tick borne panel     # OI prophylaxis  Given the high dose steroids 70mg/day prednisone- He needs Bactrim or mepron for PJP prevention- receiving Bactrim SS/daily  Valganciclovir for CMV prevention- repeat CMV viral load 5/27 non det    # elevated fungitell >100pg/ml - pt denies cough, HA, nasal congestion  - Repeat fungitell pending  - in the meantime- in the setting of high dose steroids and immune compromised state will change antifungal medication to posaconazole  - will consider checking a head CT scan/sinus cuts

## 2020-06-01 NOTE — PROGRESS NOTE ADULT - PROBLEM SELECTOR PLAN 2
RHC and biopsy 2/20 normal hemodynamics and neg for rejection.  Hep C + treated (Hep C PCR 5/4 undetectable)  -transition tacrolimus to everolimus. Urine P/C ratio 0.2; started on everolimus 0.75 q12  -Cellcept d/c'd as he will be on high dose prednisone, everolimus  - reduce tac to 3/3 given addition of posaconazole; goal tac 6-8  -Continue Valganciclovir 450mg daily for CMV prophylaxis. CMV PCR 5/2 <1.70. 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 No active bleeding.  - Continue pravastatin 20mg bedtime  - Continue MgOx for medication- induced hypomagnesemia RHC and biopsy 2/20 normal hemodynamics and neg for rejection.  Hep C + treated (Hep C PCR 5/4 undetectable)  -Transitioned from tacrolimus to everolimus in light of possible tacrolimus induced hemolytic anemia. Urine P/C ratio 0.2; Continue everolimus 0.75 q12 (Started on 5/29)  -Cellcept d/c'd as he will be on high dose prednisone.   - reduce tac to 1mg tonight then 1.5mg BID starting tomorrow. Given addition of posaconazole; goal tac 6-8  -Continue Valganciclovir 450mg daily for CMV prophylaxis. 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 No active bleeding.  - Continue pravastatin 20mg bedtime  - Continue MgOx for medication- induced hypomagnesemia

## 2020-06-01 NOTE — PROGRESS NOTE ADULT - ASSESSMENT
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 COVID-19 PCR negative in ER. Beside low grade temp (100.4) in ER, no signs or symptoms of infection at this time. He reported mild dyspnea in the past few days prior to admission with walking around the house sometimes. He is satting well in RA. CBC w/ pancytopenia w/ low retic count and no e/o of hemolysis. Possibly related to valcyte + bactrim. No evidence of PTLD on imaging. He appears well, euvolemic, and nontoxic. Due to elevated Fungitell, placed on posaconazole; no signs of infection    INCOMPLETE NOTE 70 year old man with history of NICM s/p HM2 then heart transplant on 2/23/18 (coronary fistula, HCV + s/p Rx), prior bilateral subclavian DVT, prior antibody mediated rejection s/p IVIG/plasmapharesis/Rituximab, recent diagnosis of autoimmune hemolytic anemia and CKD (b/l Cr 1.4) was sent in from hematology outpatient clinic for transfusion(Hgb 6.1) and Rituxan due to inability to obtain access. COVID-19 PCR negative in ER. Beside low grade temp (100.4) in ER, no signs or symptoms of infection at this time. He reported mild dyspnea in the past few days prior to admission with walking around the house sometimes. He is satting well in RA. CBC w/ pancytopenia w/ low retic count and no e/o of hemolysis. Possibly related to valcyte + bactrim. No evidence of PTLD on imaging. He appears well, euvolemic, and nontoxic. Due to elevated Fungitell, placed on posaconazole; no signs of infection. However, tacrolimus level remains above our goal.      Kely Jha D.O.  Adv. HF and transplant fellow  [phone] 70 year old man with history of NICM s/p HM2 then heart transplant on 2/23/18 (coronary fistula, HCV + s/p Rx), prior bilateral subclavian DVT, prior antibody mediated rejection s/p IVIG/plasmapharesis/Rituximab, recent diagnosis of autoimmune hemolytic anemia and CKD (b/l Cr 1.4) was sent in from hematology outpatient clinic for transfusion(Hgb 6.1) and Rituxan due to inability to obtain access. COVID-19 PCR negative in ER. Beside low grade temp (100.4) in ER, no signs or symptoms of infection at this time. He reported mild dyspnea in the past few days prior to admission with walking around the house sometimes. He is satting well in RA. CBC w/ pancytopenia w/ low retic count and no e/o of hemolysis. Possibly related to valcyte + bactrim. No evidence of PTLD on imaging. He appears well, euvolemic, and nontoxic. Due to elevated Fungitell, placed on posaconazole; no signs of infection. However, tacrolimus level remains above our goal. We'll decrease the tacrolimus dose. Plan for DC tomorrow.      Kely Jha D.O.  Adv. HF and transplant fellow  [phone] 70 year old man with history of NICM s/p HM2 then heart transplant on 2/23/18 (coronary fistula, HCV + s/p Rx), prior bilateral subclavian DVT, prior antibody mediated rejection s/p IVIG/plasmapharesis/Rituximab, recent diagnosis of autoimmune hemolytic anemia and CKD (b/l Cr 1.4) was sent in from hematology outpatient clinic for transfusion(Hgb 6.1) and Rituxan due to inability to obtain access. COVID-19 PCR negative in ER. Beside low grade temp (100.4) in ER, no signs or symptoms of infection at this time. He reported mild dyspnea in the past few days prior to admission with walking around the house sometimes. He is satting well in RA. CBC w/ pancytopenia w/ low retic count and no e/o of hemolysis. Possibly related to valcyte + bactrim. No evidence of PTLD on imaging. He appears well, euvolemic, and nontoxic. Autoimmune hemolytic anemia could be secondary to tacrolimus. We started on everolimus with plans to discontinue tacrolimus in the next few weeks.  Due to elevated Fungitell, placed on posaconazole; no signs of infection. However, tacrolimus level remains above our goal. We'll decrease the tacrolimus dose. Plan for DC tomorrow.      Kely Jha D.O.  Adv. HF and transplant fellow  [phone]

## 2020-06-01 NOTE — PROGRESS NOTE ADULT - PROBLEM SELECTOR PLAN 1
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 Infectious work up has been negative. Reportedly refractory to steroids however hemolysis labs as outpatient were improving. Awaiting results of BM biopsy; no plan for further Rituxan as per heme  - Pancytopenia possibly 2/2 medication-induced BM suppression  - No evidence of hemolysis of on labs or PTLD on imaging  - Hematology following - F/u BM biopsy  - ID following - initial infection workup was negative on last admission (PCR negative: EBV, parvovirus, Hep B/C, HIV). PCR testing for babesia negative. Tick-borne panel negative  - Continue folic acid  - Continue prednisone 70 mg daily (taper to be determined by heme) Jacky positive (IgG) hemolytic anemia + cold autoantibody. Infectious work up has been negative. Reportedly refractory to steroids however hemolysis labs as outpatient were improving. BM biopsy (preliminary-no significant abnormalities but awaiting final read) ;no plan for further Rituxan as per heme  - Pancytopenia possibly 2/2 medication-induced BM suppression  - No evidence of hemolysis of on labs or PTLD on imaging  - Hematology following - F/u BM biopsy  - ID following - initial infection workup was negative on last admission   - Continue folic acid  - Decrease prednisone 70 mg daily to 60mg daily (per Heme)  -Repeat CBC in PM.

## 2020-06-01 NOTE — PROGRESS NOTE ADULT - SUBJECTIVE AND OBJECTIVE BOX
INFECTIOUS DISEASE PROGRESS NOTE  Subjective: Feeling well, disappointed that he cannot go home today.     VITALS  Vital Signs Last 24 Hrs  T(C): 36.5 (01 Jun 2020 05:15), Max: 36.5 (31 May 2020 19:52)  T(F): 97.7 (01 Jun 2020 05:15), Max: 97.7 (31 May 2020 19:52)  HR: 111 (01 Jun 2020 05:15) (110 - 111)  BP: 130/86 (01 Jun 2020 05:15) (128/84 - 130/86)  BP(mean): --  RR: 18 (01 Jun 2020 05:15) (18 - 18)  SpO2: 100% (01 Jun 2020 05:15) (99% - 100%)    I&O's Summary    31 May 2020 07:01  -  01 Jun 2020 07:00  --------------------------------------------------------  IN: 1260 mL / OUT: 1800 mL / NET: -540 mL    01 Jun 2020 07:01  -  01 Jun 2020 16:30  --------------------------------------------------------  IN: 360 mL / OUT: 0 mL / NET: 360 mL    PHYSICAL EXAM  General: A&Ox 3; NAD  Eyes: PERRL; EOMI; anicteric sclera  Neck: Supple; no JVD  Respiratory: CTA B/L; no wheezes/crackles/rales auscultated w/ good air movement  Cardiovascular: Regular rhythm/rate; S1/S2; no gallops or murmurs auscultated  Gastrointestinal: Soft; NTND w/out rebound tenderness or guarding; bowel sounds normal  Extremities: WWP; no edema or cyanosis; radial/pedal pulses palpable  Neurological:  CNII-XII grossly intact; no obvious focal deficits    MEDICATIONS  (STANDING):  aspirin enteric coated 81 milliGRAM(s) Oral daily  cholecalciferol 1000 Unit(s) Oral daily  everolimus (ZORTRESS) 0.75 milliGRAM(s) Oral <User Schedule>  folic acid 1 milliGRAM(s) Oral <User Schedule>  magnesium oxide 400 milliGRAM(s) Oral <User Schedule>  pantoprazole    Tablet 40 milliGRAM(s) Oral before breakfast  posaconazole DR Tablet 300 milliGRAM(s) Oral <User Schedule>  pravastatin 20 milliGRAM(s) Oral at bedtime  sodium bicarbonate 650 milliGRAM(s) Oral <User Schedule>  sodium zirconium cyclosilicate 10 Gram(s) Oral <User Schedule>  tacrolimus 1 milliGRAM(s) Oral <User Schedule>  trimethoprim   80 mG/sulfamethoxazole 400 mG 1 Tablet(s) Oral daily  valGANciclovir 450 milliGRAM(s) Oral <User Schedule>    MEDICATIONS  (PRN):  acetaminophen   Tablet .. 650 milliGRAM(s) Oral every 6 hours PRN Mild Pain (1 - 3)      LABS                        8.7    8.20  )-----------( 379      ( 01 Jun 2020 07:16 )             27.1     06-01    136  |  102  |  50<H>  ----------------------------<  212<H>  5.3   |  25  |  1.61<H>    Ca    9.1      01 Jun 2020 07:16    TPro  6.2  /  Alb  3.7  /  TBili  1.5<H>  /  DBili  x   /  AST  16  /  ALT  25  /  AlkPhos  88  06-01    LIVER FUNCTIONS - ( 01 Jun 2020 07:16 )  Alb: 3.7 g/dL / Pro: 6.2 g/dL / ALK PHOS: 88 U/L / ALT: 25 U/L / AST: 16 U/L / GGT: x           Fungitell (05.27.20 @ 11:14)    Fungitell: 147    Babesia microti PCR, Blood. (collected 05-27-20 @ 10:16)  Source: .Blood  Final Report (05-27-20 @ 17:27):    Babesia microti PCR    Results: NOT detected    ***************Result Note*************    The detection of Babesia microti by PCR has only been    validated for whole blood; this test has not been approved    by the US Food and Drug Administration (FDA). Performance    characteristics of this assay have been determined by    [employer]. The clinical significance    of results should be considered in conjunction with the    overall clinical presentation of the patient. Result is not    intended to be used as the sole means for clinical diagnosis    or patient management decisions.    One negative sample does not necessarily rule    out the presence of a parasitic infection.    < from: CT Chest No Cont (05.22.20 @ 20:29) >  IMPRESSION:     1.  No change in the right upper lobe linear opacity likely due to scarring or the additional bilateral linear opacities likely due to atelectasis or scarring.  2.  Emphysema.  3.  No enlarged chest lymph nodes.    < end of copied text >

## 2020-06-01 NOTE — CHART NOTE - NSCHARTNOTEFT_GEN_A_CORE
Bone marrow biopsy showed hypercellular marrow with erythroid hyperplasia with increased number of pronormoblasts and minimal erythroid maturation, decreased myelopoiesis and slightly increased megakaryocytosis with mild to moderate fibrosis.  There is no morphologic or immunophenotypic evidence supporting involvement by leukemia/lymphoma.    -the current anemia is  likely due to bone marrow suppression from medications   -pt is now off Cellcept, bactrim dose is reduced  -slight improvement in reticulocyte count but still suboptimal  -hemolysis has resolved and pt responded to steroid, received one dose of rituxan. Can taper to 50 mg steroid daily. Pt can follow up with Dr. Isaacs for further taper over 4-6 weeks if hemolysis remains stable.    Luciano Menchaca MD. PGY 5  Heme-Onc fellow  [phone]; [id number]

## 2020-06-02 ENCOUNTER — TRANSCRIPTION ENCOUNTER (OUTPATIENT)
Age: 70
End: 2020-06-02

## 2020-06-02 VITALS
RESPIRATION RATE: 18 BRPM | OXYGEN SATURATION: 100 % | TEMPERATURE: 98 F | SYSTOLIC BLOOD PRESSURE: 145 MMHG | DIASTOLIC BLOOD PRESSURE: 79 MMHG | HEART RATE: 131 BPM

## 2020-06-02 PROBLEM — Z86.2 PERSONAL HISTORY OF DISEASES OF THE BLOOD AND BLOOD-FORMING ORGANS AND CERTAIN DISORDERS INVOLVING THE IMMUNE MECHANISM: Chronic | Status: ACTIVE | Noted: 2020-05-22

## 2020-06-02 LAB
ANION GAP SERPL CALC-SCNC: 13 MMOL/L — SIGNIFICANT CHANGE UP (ref 5–17)
BUN SERPL-MCNC: 46 MG/DL — HIGH (ref 7–23)
CALCIUM SERPL-MCNC: 9.1 MG/DL — SIGNIFICANT CHANGE UP (ref 8.4–10.5)
CHLORIDE SERPL-SCNC: 100 MMOL/L — SIGNIFICANT CHANGE UP (ref 96–108)
CO2 SERPL-SCNC: 22 MMOL/L — SIGNIFICANT CHANGE UP (ref 22–31)
CREAT SERPL-MCNC: 1.55 MG/DL — HIGH (ref 0.5–1.3)
FUNGITELL: <31 PG/ML — SIGNIFICANT CHANGE UP
GLUCOSE SERPL-MCNC: 242 MG/DL — HIGH (ref 70–99)
HCT VFR BLD CALC: 30.9 % — LOW (ref 39–50)
HGB BLD-MCNC: 10.2 G/DL — LOW (ref 13–17)
LDH SERPL L TO P-CCNC: 323 U/L — HIGH (ref 50–242)
MCHC RBC-ENTMCNC: 30.1 PG — SIGNIFICANT CHANGE UP (ref 27–34)
MCHC RBC-ENTMCNC: 33 GM/DL — SIGNIFICANT CHANGE UP (ref 32–36)
MCV RBC AUTO: 91.2 FL — SIGNIFICANT CHANGE UP (ref 80–100)
NRBC # BLD: 0 /100 WBCS — SIGNIFICANT CHANGE UP (ref 0–0)
PLATELET # BLD AUTO: 409 K/UL — HIGH (ref 150–400)
POTASSIUM SERPL-MCNC: 4.8 MMOL/L — SIGNIFICANT CHANGE UP (ref 3.5–5.3)
POTASSIUM SERPL-SCNC: 4.8 MMOL/L — SIGNIFICANT CHANGE UP (ref 3.5–5.3)
RBC # BLD: 3.39 M/UL — LOW (ref 4.2–5.8)
RBC # FLD: 14.7 % — HIGH (ref 10.3–14.5)
SODIUM SERPL-SCNC: 135 MMOL/L — SIGNIFICANT CHANGE UP (ref 135–145)
TACROLIMUS SERPL-MCNC: 16.7 NG/ML — SIGNIFICANT CHANGE UP
WBC # BLD: 12.04 K/UL — HIGH (ref 3.8–10.5)
WBC # FLD AUTO: 12.04 K/UL — HIGH (ref 3.8–10.5)

## 2020-06-02 PROCEDURE — 82435 ASSAY OF BLOOD CHLORIDE: CPT

## 2020-06-02 PROCEDURE — 88237 TISSUE CULTURE BONE MARROW: CPT

## 2020-06-02 PROCEDURE — 86665 EPSTEIN-BARR CAPSID VCA: CPT

## 2020-06-02 PROCEDURE — 88280 CHROMOSOME KARYOTYPE STUDY: CPT

## 2020-06-02 PROCEDURE — 88305 TISSUE EXAM BY PATHOLOGIST: CPT

## 2020-06-02 PROCEDURE — 82803 BLOOD GASES ANY COMBINATION: CPT

## 2020-06-02 PROCEDURE — 84484 ASSAY OF TROPONIN QUANT: CPT

## 2020-06-02 PROCEDURE — 83010 ASSAY OF HAPTOGLOBIN QUANT: CPT

## 2020-06-02 PROCEDURE — 88184 FLOWCYTOMETRY/ TC 1 MARKER: CPT

## 2020-06-02 PROCEDURE — 87522 HEPATITIS C REVRS TRNSCRPJ: CPT

## 2020-06-02 PROCEDURE — 88342 IMHCHEM/IMCYTCHM 1ST ANTB: CPT

## 2020-06-02 PROCEDURE — 86850 RBC ANTIBODY SCREEN: CPT

## 2020-06-02 PROCEDURE — 82962 GLUCOSE BLOOD TEST: CPT

## 2020-06-02 PROCEDURE — 36430 TRANSFUSION BLD/BLD COMPNT: CPT

## 2020-06-02 PROCEDURE — 71045 X-RAY EXAM CHEST 1 VIEW: CPT

## 2020-06-02 PROCEDURE — 81001 URINALYSIS AUTO W/SCOPE: CPT

## 2020-06-02 PROCEDURE — 82550 ASSAY OF CK (CPK): CPT

## 2020-06-02 PROCEDURE — 99233 SBSQ HOSP IP/OBS HIGH 50: CPT | Mod: GC

## 2020-06-02 PROCEDURE — 88360 TUMOR IMMUNOHISTOCHEM/MANUAL: CPT

## 2020-06-02 PROCEDURE — 86431 RHEUMATOID FACTOR QUANT: CPT

## 2020-06-02 PROCEDURE — 83615 LACTATE (LD) (LDH) ENZYME: CPT

## 2020-06-02 PROCEDURE — P9016: CPT

## 2020-06-02 PROCEDURE — 86870 RBC ANTIBODY IDENTIFICATION: CPT

## 2020-06-02 PROCEDURE — 85027 COMPLETE CBC AUTOMATED: CPT

## 2020-06-02 PROCEDURE — 71250 CT THORAX DX C-: CPT

## 2020-06-02 PROCEDURE — 82248 BILIRUBIN DIRECT: CPT

## 2020-06-02 PROCEDURE — 87521 HEPATITIS C PROBE&RVRS TRNSC: CPT

## 2020-06-02 PROCEDURE — 99285 EMERGENCY DEPT VISIT HI MDM: CPT | Mod: 25

## 2020-06-02 PROCEDURE — 99239 HOSP IP/OBS DSCHRG MGMT >30: CPT

## 2020-06-02 PROCEDURE — 76705 ECHO EXAM OF ABDOMEN: CPT

## 2020-06-02 PROCEDURE — 84100 ASSAY OF PHOSPHORUS: CPT

## 2020-06-02 PROCEDURE — 85045 AUTOMATED RETICULOCYTE COUNT: CPT

## 2020-06-02 PROCEDURE — 93005 ELECTROCARDIOGRAM TRACING: CPT

## 2020-06-02 PROCEDURE — 86664 EPSTEIN-BARR NUCLEAR ANTIGEN: CPT

## 2020-06-02 PROCEDURE — 86860 RBC ANTIBODY ELUTION: CPT

## 2020-06-02 PROCEDURE — 87517 HEPATITIS B DNA QUANT: CPT

## 2020-06-02 PROCEDURE — P9040: CPT

## 2020-06-02 PROCEDURE — 83880 ASSAY OF NATRIURETIC PEPTIDE: CPT

## 2020-06-02 PROCEDURE — 82728 ASSAY OF FERRITIN: CPT

## 2020-06-02 PROCEDURE — 86901 BLOOD TYPING SEROLOGIC RH(D): CPT

## 2020-06-02 PROCEDURE — 84295 ASSAY OF SERUM SODIUM: CPT

## 2020-06-02 PROCEDURE — 86922 COMPATIBILITY TEST ANTIGLOB: CPT

## 2020-06-02 PROCEDURE — 85379 FIBRIN DEGRADATION QUANT: CPT

## 2020-06-02 PROCEDURE — 82553 CREATINE MB FRACTION: CPT

## 2020-06-02 PROCEDURE — 85730 THROMBOPLASTIN TIME PARTIAL: CPT

## 2020-06-02 PROCEDURE — 82272 OCCULT BLD FECES 1-3 TESTS: CPT

## 2020-06-02 PROCEDURE — 82607 VITAMIN B-12: CPT

## 2020-06-02 PROCEDURE — 82330 ASSAY OF CALCIUM: CPT

## 2020-06-02 PROCEDURE — 84132 ASSAY OF SERUM POTASSIUM: CPT

## 2020-06-02 PROCEDURE — 80053 COMPREHEN METABOLIC PANEL: CPT

## 2020-06-02 PROCEDURE — 83605 ASSAY OF LACTIC ACID: CPT

## 2020-06-02 PROCEDURE — 86618 LYME DISEASE ANTIBODY: CPT

## 2020-06-02 PROCEDURE — 80048 BASIC METABOLIC PNL TOTAL CA: CPT

## 2020-06-02 PROCEDURE — 87449 NOS EACH ORGANISM AG IA: CPT

## 2020-06-02 PROCEDURE — 87205 SMEAR GRAM STAIN: CPT

## 2020-06-02 PROCEDURE — 88341 IMHCHEM/IMCYTCHM EA ADD ANTB: CPT

## 2020-06-02 PROCEDURE — 84145 PROCALCITONIN (PCT): CPT

## 2020-06-02 PROCEDURE — 74177 CT ABD & PELVIS W/CONTRAST: CPT

## 2020-06-02 PROCEDURE — 86900 BLOOD TYPING SEROLOGIC ABO: CPT

## 2020-06-02 PROCEDURE — 88312 SPECIAL STAINS GROUP 1: CPT

## 2020-06-02 PROCEDURE — 85610 PROTHROMBIN TIME: CPT

## 2020-06-02 PROCEDURE — 93308 TTE F-UP OR LMTD: CPT

## 2020-06-02 PROCEDURE — 84156 ASSAY OF PROTEIN URINE: CPT

## 2020-06-02 PROCEDURE — 82247 BILIRUBIN TOTAL: CPT

## 2020-06-02 PROCEDURE — 82306 VITAMIN D 25 HYDROXY: CPT

## 2020-06-02 PROCEDURE — 86038 ANTINUCLEAR ANTIBODIES: CPT

## 2020-06-02 PROCEDURE — 83550 IRON BINDING TEST: CPT

## 2020-06-02 PROCEDURE — 85097 BONE MARROW INTERPRETATION: CPT

## 2020-06-02 PROCEDURE — 83540 ASSAY OF IRON: CPT

## 2020-06-02 PROCEDURE — 86666 EHRLICHIA ANTIBODY: CPT

## 2020-06-02 PROCEDURE — 88185 FLOWCYTOMETRY/TC ADD-ON: CPT

## 2020-06-02 PROCEDURE — 87635 SARS-COV-2 COVID-19 AMP PRB: CPT

## 2020-06-02 PROCEDURE — 80076 HEPATIC FUNCTION PANEL: CPT

## 2020-06-02 PROCEDURE — 86880 COOMBS TEST DIRECT: CPT

## 2020-06-02 PROCEDURE — 80197 ASSAY OF TACROLIMUS: CPT

## 2020-06-02 PROCEDURE — 88264 CHROMOSOME ANALYSIS 20-25: CPT

## 2020-06-02 PROCEDURE — 82565 ASSAY OF CREATININE: CPT

## 2020-06-02 PROCEDURE — 87798 DETECT AGENT NOS DNA AMP: CPT

## 2020-06-02 PROCEDURE — 99231 SBSQ HOSP IP/OBS SF/LOW 25: CPT | Mod: GC

## 2020-06-02 PROCEDURE — 86663 EPSTEIN-BARR ANTIBODY: CPT

## 2020-06-02 PROCEDURE — 82947 ASSAY GLUCOSE BLOOD QUANT: CPT

## 2020-06-02 PROCEDURE — 83735 ASSAY OF MAGNESIUM: CPT

## 2020-06-02 PROCEDURE — 85384 FIBRINOGEN ACTIVITY: CPT

## 2020-06-02 PROCEDURE — 86140 C-REACTIVE PROTEIN: CPT

## 2020-06-02 PROCEDURE — 82746 ASSAY OF FOLIC ACID SERUM: CPT

## 2020-06-02 PROCEDURE — 87389 HIV-1 AG W/HIV-1&-2 AB AG IA: CPT

## 2020-06-02 PROCEDURE — 86753 PROTOZOA ANTIBODY NOS: CPT

## 2020-06-02 PROCEDURE — 76700 US EXAM ABDOM COMPLETE: CPT

## 2020-06-02 PROCEDURE — 86036 ANCA SCREEN EACH ANTIBODY: CPT

## 2020-06-02 PROCEDURE — 82570 ASSAY OF URINE CREATININE: CPT

## 2020-06-02 PROCEDURE — 87799 DETECT AGENT NOS DNA QUANT: CPT

## 2020-06-02 PROCEDURE — 85014 HEMATOCRIT: CPT

## 2020-06-02 PROCEDURE — 88313 SPECIAL STAINS GROUP 2: CPT

## 2020-06-02 PROCEDURE — 82150 ASSAY OF AMYLASE: CPT

## 2020-06-02 PROCEDURE — 83690 ASSAY OF LIPASE: CPT

## 2020-06-02 PROCEDURE — 93306 TTE W/DOPPLER COMPLETE: CPT

## 2020-06-02 RX ORDER — ASPIRIN/CALCIUM CARB/MAGNESIUM 324 MG
1 TABLET ORAL
Qty: 0 | Refills: 0 | DISCHARGE
Start: 2020-06-02

## 2020-06-02 RX ORDER — BACITRACIN ZINC 500 UNIT/G
1 OINTMENT IN PACKET (EA) TOPICAL
Qty: 0 | Refills: 0 | DISCHARGE
Start: 2020-06-02

## 2020-06-02 RX ORDER — POSACONAZOLE 100 MG/1
3 TABLET, DELAYED RELEASE ORAL
Qty: 0 | Refills: 0 | DISCHARGE
Start: 2020-06-02

## 2020-06-02 RX ORDER — TACROLIMUS 5 MG/1
1 CAPSULE ORAL
Refills: 0 | Status: DISCONTINUED | OUTPATIENT
Start: 2020-06-03 | End: 2020-06-02

## 2020-06-02 RX ORDER — SODIUM BICARBONATE 1 MEQ/ML
1 SYRINGE (ML) INTRAVENOUS
Qty: 0 | Refills: 0 | DISCHARGE

## 2020-06-02 RX ORDER — MAGNESIUM OXIDE 400 MG ORAL TABLET 241.3 MG
1 TABLET ORAL
Qty: 0 | Refills: 0 | DISCHARGE
Start: 2020-06-02

## 2020-06-02 RX ORDER — VALGANCICLOVIR 450 MG/1
1 TABLET, FILM COATED ORAL
Qty: 0 | Refills: 0 | DISCHARGE
Start: 2020-06-02

## 2020-06-02 RX ORDER — NYSTATIN 100000 [USP'U]/ML
100000 SUSPENSION ORAL 4 TIMES DAILY
Qty: 600 | Refills: 5 | Status: DISCONTINUED | COMMUNITY
Start: 2020-05-29 | End: 2020-06-02

## 2020-06-02 RX ORDER — FOLIC ACID 0.8 MG
1 TABLET ORAL
Qty: 0 | Refills: 0 | DISCHARGE
Start: 2020-06-02

## 2020-06-02 RX ORDER — SODIUM BICARBONATE 1 MEQ/ML
1 SYRINGE (ML) INTRAVENOUS
Qty: 0 | Refills: 0 | DISCHARGE
Start: 2020-06-02

## 2020-06-02 RX ORDER — TACROLIMUS 5 MG/1
8 CAPSULE ORAL
Qty: 0 | Refills: 0 | DISCHARGE

## 2020-06-02 RX ORDER — AZTREONAM 2 G
1 VIAL (EA) INJECTION
Qty: 0 | Refills: 0 | DISCHARGE

## 2020-06-02 RX ORDER — PANTOPRAZOLE SODIUM 20 MG/1
1 TABLET, DELAYED RELEASE ORAL
Qty: 0 | Refills: 0 | DISCHARGE
Start: 2020-06-02

## 2020-06-02 RX ORDER — ACETAMINOPHEN 500 MG
2 TABLET ORAL
Qty: 0 | Refills: 0 | DISCHARGE
Start: 2020-06-02

## 2020-06-02 RX ORDER — MYCOPHENOLATE MOFETIL 250 MG/1
2 CAPSULE ORAL
Qty: 0 | Refills: 0 | DISCHARGE

## 2020-06-02 RX ORDER — EVEROLIMUS 10 MG/1
1 TABLET ORAL
Qty: 0 | Refills: 0 | DISCHARGE
Start: 2020-06-02

## 2020-06-02 RX ORDER — CHOLECALCIFEROL (VITAMIN D3) 125 MCG
1000 CAPSULE ORAL
Qty: 0 | Refills: 0 | DISCHARGE
Start: 2020-06-02

## 2020-06-02 RX ADMIN — Medication 1 MILLIGRAM(S): at 08:08

## 2020-06-02 RX ADMIN — POSACONAZOLE 300 MILLIGRAM(S): 100 TABLET, DELAYED RELEASE ORAL at 08:09

## 2020-06-02 RX ADMIN — Medication 81 MILLIGRAM(S): at 12:59

## 2020-06-02 RX ADMIN — PANTOPRAZOLE SODIUM 40 MILLIGRAM(S): 20 TABLET, DELAYED RELEASE ORAL at 07:22

## 2020-06-02 RX ADMIN — Medication 650 MILLIGRAM(S): at 09:33

## 2020-06-02 RX ADMIN — SODIUM ZIRCONIUM CYCLOSILICATE 10 GRAM(S): 10 POWDER, FOR SUSPENSION ORAL at 16:18

## 2020-06-02 RX ADMIN — VALGANCICLOVIR 450 MILLIGRAM(S): 450 TABLET, FILM COATED ORAL at 08:08

## 2020-06-02 RX ADMIN — Medication 60 MILLIGRAM(S): at 08:08

## 2020-06-02 RX ADMIN — TACROLIMUS 1.5 MILLIGRAM(S): 5 CAPSULE ORAL at 08:08

## 2020-06-02 RX ADMIN — Medication 1 TABLET(S): at 12:59

## 2020-06-02 RX ADMIN — Medication 1 APPLICATION(S): at 08:09

## 2020-06-02 RX ADMIN — MAGNESIUM OXIDE 400 MG ORAL TABLET 400 MILLIGRAM(S): 241.3 TABLET ORAL at 08:08

## 2020-06-02 RX ADMIN — Medication 650 MILLIGRAM(S): at 08:08

## 2020-06-02 RX ADMIN — EVEROLIMUS 0.75 MILLIGRAM(S): 10 TABLET ORAL at 08:08

## 2020-06-02 RX ADMIN — Medication 1000 UNIT(S): at 12:59

## 2020-06-02 NOTE — PROGRESS NOTE ADULT - ATTENDING COMMENTS
Patient feeling well  anxious to be discharged home  mild leukocytosis in the setting of high dose but tapering steroids  Will repeat labs in the out patient setting  follow up visit at time of Cardiology appt.    Gary Lujan MD  [phone]  After 5pm/weekends [phone]

## 2020-06-02 NOTE — PROGRESS NOTE ADULT - REASON FOR ADMISSION
Hemolytic Anemia

## 2020-06-02 NOTE — DISCHARGE NOTE PROVIDER - NSDCFUSCHEDAPPT_GEN_ALL_CORE_FT
BILLY RUSSELL ; 06/05/2020 ; [facility]  BILLY RUSSELL ; 06/12/2020 ; [facility]  BILLY RUSSELL ; 07/13/2020 ; [facility]

## 2020-06-02 NOTE — PROGRESS NOTE ADULT - PROBLEM SELECTOR PLAN 1
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 Infectious work up has been negative. Reportedly refractory to steroids however hemolysis labs as outpatient were improving. BM biopsy (preliminary-no significant abnormalities but awaiting final read) ;no plan for further Rituxan as per heme  - Pancytopenia possibly 2/2 medication-induced BM suppression  - No evidence of hemolysis of on labs or PTLD on imaging  - Hematology following - F/u BM biopsy  - ID following - initial infection workup was negative on last admission   - Continue folic acid  - Decrease prednisone 70 mg daily to 60mg daily (per Heme)  -Repeat CBC in PM.

## 2020-06-02 NOTE — DISCHARGE NOTE PROVIDER - HOSPITAL COURSE
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 Presented today to [facility] for IV Rituxan therapy. But unable to obtain intravenous access despite multiple attempts. Patient was also found to be severely anemic with a hemoglobin 6.1 and was sent in to [facility] ER for PRBC transfusion and to initiate Rituxan therapy. In ER Occult stool negative. COVID-19 PCR negative. Noted a low grade temp(100.4).     Hospital Course:     Admitted to 2 [facility] Telemetry floor. Awaiting Type and Screen. 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 Infectious work up has been negative. Refractory to steroids  Plan to start Rituxan therapy in AM. CHF following    5/23 VSS; guaiac neg; 2 units PRBC transfused overnight; heme/onc following; Rituxan given today as per heme/onc. Tacro level 6.1- continue tacro 8 bid     5/24 VSS 7.7/24.2. CT Chest No Cont (05.22.20 @ 20:29) No change in the right upper lobe linear opacity likely due to scarring or the additional bilateral linear opacities likely due to atelectasis or scarring. Emphysema. No enlarged chest lymph nodes.    5/25 VSS, H/H stable 7.8/24.3 today. Tacrolimus level 7.5    5/26 hemodynamics stable Tacro Level 7.9. Lokelma daily for ^ K. Valcyte changed daily     5/27 VSS; tacro level today 7.9; Rituxan as per hemeonc on 5/29. s/p bone marrow biopsy today, pending results.     5/28 VVS; Continue with current medication regimen. Plan for Rituxin per heme in AM.     5/29 VSS, Tacro level 6.5. Rituxan dose today by heme. Awaiting results of BM biopsy to determine course of Rituxan.     5/30 VSS. Tacro 11.8. Profraf 4mg BID. Pending BM biopsy results.     5/31 VSS Tacro 14.9 Tacro 2 mg tonight at 8 pm Tacro  3 mg BID 6/1  Pending BM biopsy results    6/1 VSS; tacro level today 15.8; h/h 9/26 today; tacro dose adjusted to 1 mg this evening then 1.5 bid; discharge planning - await heme recs and posaconazole approval     6/2/20 - pt cleared for discharge home today AS PER DR. LAWLER AND HEART FAILURE TEAM  will hold tacro dose tonight & in am 6/3 - will start Tacro 1mg po bid - 1st dose @ 8pm Wednesday, Chelo 3.

## 2020-06-02 NOTE — PROGRESS NOTE ADULT - PROBLEM SELECTOR PROBLEM 2
Heart transplanted

## 2020-06-02 NOTE — DISCHARGE NOTE PROVIDER - NSDCFUADDAPPT_GEN_ALL_CORE_FT
Please follow up with all doctor/clinic visits  Please take all your medications as prescribed  follow up appt with DR. Stahl in 1 week - heart failure office [phone].

## 2020-06-02 NOTE — PROGRESS NOTE ADULT - SUBJECTIVE AND OBJECTIVE BOX
INFECTIOUS DISEASE PROGRESS NOTE  Subjective: Feeling well, planned for discharge today.     VITALS  Vital Signs Last 24 Hrs  T(C): 36.4 (02 Jun 2020 05:00), Max: 36.7 (01 Jun 2020 19:40)  T(F): 97.5 (02 Jun 2020 05:00), Max: 98 (01 Jun 2020 19:40)  HR: 123 (02 Jun 2020 07:10) (122 - 134)  BP: 149/93 (02 Jun 2020 05:00) (134/90 - 149/93)  BP(mean): 112 (01 Jun 2020 19:40) (112 - 112)  RR: 18 (02 Jun 2020 05:00) (18 - 18)  SpO2: 98% (02 Jun 2020 05:00) (98% - 99%)    I&O's Summary    01 Jun 2020 07:01  -  02 Jun 2020 07:00  --------------------------------------------------------  IN: 960 mL / OUT: 950 mL / NET: 10 mL    02 Jun 2020 07:01  -  02 Jun 2020 12:30  --------------------------------------------------------  IN: 360 mL / OUT: 0 mL / NET: 360 mL        CAPILLARY BLOOD GLUCOSE          PHYSICAL EXAM  General: A&Ox 3; NAD  Eyes: PERRL; EOMI; anicteric sclera  Neck: Supple; no JVD  Respiratory: CTA B/L; no wheezes/crackles/rales auscultated w/ good air movement  Cardiovascular: Regular rhythm/rate; S1/S2; no gallops or murmurs auscultated; sternal scar  Gastrointestinal: Soft; NTND w/out rebound tenderness or guarding; bowel sounds normal  Extremities: WWP; no edema or cyanosis  Neurological:  CNII-XII grossly intact; no obvious focal deficits    MEDICATIONS  (STANDING):  aspirin enteric coated 81 milliGRAM(s) Oral daily  BACItracin   Ointment 1 Application(s) Topical two times a day  cholecalciferol 1000 Unit(s) Oral daily  everolimus (ZORTRESS) 0.75 milliGRAM(s) Oral <User Schedule>  folic acid 1 milliGRAM(s) Oral <User Schedule>  magnesium oxide 400 milliGRAM(s) Oral <User Schedule>  pantoprazole    Tablet 40 milliGRAM(s) Oral before breakfast  posaconazole DR Tablet 300 milliGRAM(s) Oral <User Schedule>  pravastatin 20 milliGRAM(s) Oral at bedtime  predniSONE   Tablet 60 milliGRAM(s) Oral <User Schedule>  sodium bicarbonate 650 milliGRAM(s) Oral <User Schedule>  sodium zirconium cyclosilicate 10 Gram(s) Oral <User Schedule>  trimethoprim   80 mG/sulfamethoxazole 400 mG 1 Tablet(s) Oral daily  valGANciclovir 450 milliGRAM(s) Oral <User Schedule>    MEDICATIONS  (PRN):  acetaminophen   Tablet .. 650 milliGRAM(s) Oral every 6 hours PRN Mild Pain (1 - 3)      LABS                        10.2   12.04 )-----------( 409      ( 02 Jun 2020 07:26 )             30.9     06-02    135  |  100  |  46<H>  ----------------------------<  242<H>  4.8   |  22  |  1.55<H>    Ca    9.1      02 Jun 2020 07:26    TPro  6.2  /  Alb  3.7  /  TBili  1.5<H>  /  DBili  x   /  AST  16  /  ALT  25  /  AlkPhos  88  06-01    LIVER FUNCTIONS - ( 01 Jun 2020 07:16 )  Alb: 3.7 g/dL / Pro: 6.2 g/dL / ALK PHOS: 88 U/L / ALT: 25 U/L / AST: 16 U/L / GGT: x                       Babesia microti PCR, Blood. (collected 05-27-20 @ 10:16)  Source: .Blood  Final Report (05-27-20 @ 17:27):    Babesia microti PCR    Results: NOT detected    ***************Result Note*************    The detection of Babesia microti by PCR has only been    validated for whole blood; this test has not been approved    by the US Food and Drug Administration (FDA). Performance    characteristics of this assay have been determined by    [employer]. The clinical significance    of results should be considered in conjunction with the    overall clinical presentation of the patient. Result is not    intended to be used as the sole means for clinical diagnosis    or patient management decisions.    One negative sample does not necessarily rule    out the presence of a parasitic infection.      Babesia microti PCR, Blood. (collected 05-27-20 @ 10:16)  Source: .Blood  Final Report (05-27-20 @ 17:27):    Babesia microti PCR    Results: NOT detected    ***************Result Note*************    The detection of Babesia microti by PCR has only been    validated for whole blood; this test has not been approved    by the US Food and Drug Administration (FDA). Performance    characteristics of this assay have been determined by    [employer]. The clinical significance    of results should be considered in conjunction with the    overall clinical presentation of the patient. Result is not    intended to be used as the sole means for clinical diagnosis    or patient management decisions.    One negative sample does not necessarily rule    out the presence of a parasitic infection.

## 2020-06-02 NOTE — PROGRESS NOTE ADULT - PROBLEM SELECTOR PROBLEM 1
Autoimmune hemolytic anemia

## 2020-06-02 NOTE — DISCHARGE NOTE NURSING/CASE MANAGEMENT/SOCIAL WORK - PATIENT PORTAL LINK FT
You can access the FollowMyHealth Patient Portal offered by [facility] by registering at the following website: [email or web address]. By joining [employer]s FollowMyHealth portal, you will also be able to view your health information using other applications (apps) compatible with our system.

## 2020-06-02 NOTE — DISCHARGE NOTE PROVIDER - NSDCPNSUBOBJ_GEN_ALL_CORE
PHYSICAL EXAM        Subjective: "I'm ok."     Neurology: alert and oriented x 3, nonfocal, no gross deficits    CV : tele:  sr/ st 100-120     Sternal Wound :  CDI NAZ; sternum  Stable    Lungs: clear. RR easy, unlabored     Abdomen: soft, nontender, nondistended, positive bowel sounds, + bowel movement   Neg N/V/D     :  pt voiding without difficulty     Extremities:   RUVALCABA; neg LE edema, neg calf tenderness.   PPP bilaterally          35 minutes spent on this discharge

## 2020-06-02 NOTE — PROGRESS NOTE ADULT - PROBLEM SELECTOR PLAN 2
RHC and biopsy 2/20 normal hemodynamics and neg for rejection.  Hep C + treated (Hep C PCR 5/4 undetectable)  -Transitioned from tacrolimus to everolimus in light of possible tacrolimus induced hemolytic anemia. Urine P/C ratio 0.2; Continue everolimus 0.75 q12 (Started on 5/29)  -Cellcept d/c'd as he will be on high dose prednisone.   -Hold tacrolimus until tomorrow night. Given addition of posaconazole; goal tac 6-8  -Continue Valganciclovir 450mg daily for CMV prophylaxis. 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 No active bleeding.  - Continue pravastatin 20mg bedtime  - Continue MgOx for medication- induced hypomagnesemia RHC and biopsy 2/20 normal hemodynamics and neg for rejection.  Hep C + treated (Hep C PCR 5/4 undetectable)  -Will transition from tacrolimus to everolimus in light of possible tacrolimus induced hemolytic anemia. Urine P/C ratio 0.2; Continue everolimus 0.75 q12 (Started on 5/29)  -Cellcept d/c'd as he will be on high dose prednisone.   -Hold tacrolimus until tomorrow night. Given addition of posaconazole; goal tac 6-8  -Continue Valganciclovir 450mg daily for CMV prophylaxis. 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 No active bleeding.  - Continue pravastatin 20mg bedtime  - Continue MgOx for medication- induced hypomagnesemia

## 2020-06-02 NOTE — DISCHARGE NOTE PROVIDER - CARE PROVIDER_API CALL
Edil Stahl  [facility]  [address] [place] [address]  Phone: [phone]  Fax: [phone]  Follow Up Time:

## 2020-06-02 NOTE — PROGRESS NOTE ADULT - ASSESSMENT
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 Currently on Steroid.  - Peripheral flow cytometry 5/2: negative  - Possibly related to medications cellcept discontinued and Tacrolimus started; plan per primary team.  - Can taper steroids 10mg every 10 days as outpatient.  - no evidence of lymphadenopathy or malignancy on imaging  - Bone marrow biopsy from 5/27; Hypercellular marrow (50%) with erythroid hyperplasia with increased number of pronormoblasts and minimal erythroid  maturation, decreased myelopoiesis and slightly increased megakaryopoiesis with mild to moderate fibrosis. Negative for bone marrow suppression.  - s/p C1 rituximab 5/23, C2 held based on bone marrow biopsy result.  - continue folic acid 1mg daily  - Patient can f/u with Dr. Isaacs as outpatient upon discharge.        Karen Rolle MD  PGY 4, Oncology/Hematology fellow  (P) [phone]  After 5pm, please contact on-call team.

## 2020-06-02 NOTE — PROGRESS NOTE ADULT - ASSESSMENT
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 COVID-19 PCR negative in ER. Beside low grade temp (100.4) in ER, no signs or symptoms of infection at this time. He reported mild dyspnea in the past few days prior to admission with walking around the house sometimes. He is satting well in RA. CBC w/ pancytopenia w/ low retic count and no e/o of hemolysis. Possibly related to valcyte + bactrim. No evidence of PTLD on imaging. He appears well, euvolemic, and nontoxic. Autoimmune hemolytic anemia could be secondary to tacrolimus. We started on everolimus with plans to discontinue tacrolimus in the next few weeks.  Due to elevated Fungitell, placed on posaconazole; no signs of infection. However, tacrolimus level remains above our goal. We'll decrease the tacrolimus dose further. Discharge today. We would need a close follow up of his labs and adjust tacrolimus accordingly      Kely Jha D.O.  Adv. HF and transplant fellow  [phone]

## 2020-06-02 NOTE — PROGRESS NOTE ADULT - NSHPATTENDINGPLANDISCUSS_GEN_ALL_CORE
team
patient, Vivi Bonds Olaz[facility] and [facility] of cardiac service
patient, Vivi Seth and Cally
CTS team
Dr. Parker
[facility]

## 2020-06-02 NOTE — DISCHARGE NOTE PROVIDER - NSDCCPCAREPLAN_GEN_ALL_CORE_FT
PRINCIPAL DISCHARGE DIAGNOSIS  Diagnosis: Autoimmune hemolytic anemia  Assessment and Plan of Treatment: follow up appt with Dr. Edil Stahl in 1 week   you will have your blood drawn on thursday 6/4  Care as per heart failure.  take medications as per heart failure

## 2020-06-02 NOTE — DISCHARGE NOTE PROVIDER - NSDCMRMEDTOKEN_GEN_ALL_CORE_FT
acetaminophen 325 mg oral tablet: 2 tab(s) orally every 6 hours, As needed, Mild Pain (1 - 3)  aspirin 81 mg oral delayed release tablet: 1 tab(s) orally once a day  bacitracin 500 units/g topical ointment: 1 application topically 2 times a day  cholecalciferol oral tablet: 1000 unit(s) orally once a day  Colace 50 mg oral capsule: 1 cap(s) orally 2 times a day, As Needed  everolimus 0.75 mg oral tablet: 1 tab(s) orally 2 times a day  8AM &amp; 8PM  folic acid 1 mg oral tablet: 1 tab(s) orally once a day @ 8AM  magnesium oxide 400 mg (241.3 mg elemental magnesium) oral tablet: 1 tab(s) orally   pantoprazole 40 mg oral delayed release tablet: 1 tab(s) orally once a day (before a meal)  posaconazole 100 mg oral delayed release tablet: 3 tab(s) orally once a day  @ 8am  pravastatin 20 mg oral tablet: 1 tab(s) orally once a day (at bedtime)  predniSONE 20 mg oral tablet: 3 tab(s) orally   sodium bicarbonate 650 mg oral tablet: 1 tab(s) orally 2 times a day  8AM &amp; 8PM  sodium zirconium cyclosilicate 10 g oral powder for reconstitution: 1 tab(s) orally once a day @ 4pm  sulfamethoxazole-trimethoprim 400 mg-80 mg oral tablet: 1 tab(s) orally once a day  tacrolimus 1 mg oral capsule: 1 cap(s) orally 2 times a day    HOLD TACROLMINUS 6/2 NIGHT DOSE &amp; 6/3 AM DOSE   YOUR NEXT DOSE IS 6/3 WEDNESDAY @ 8PM  valGANciclovir 450 mg oral tablet: 1 tab(s) orally

## 2020-06-02 NOTE — PROGRESS NOTE ADULT - PROBLEM SELECTOR PROBLEM 3
CKD (chronic kidney disease), stage III

## 2020-06-02 NOTE — PHARMACY EDUCATION NOTE - EDUCATION SUMMARY
The patient is going home today. Educated the patient regarding new medications (posaconazole and cholecalciferol). Instructed the patient to take prednisone 60 mG PO daily, tacrolimus 1 mG PO twice a day (reinforced to skip tonight and tomorrow morning doses and then resume tacrolimus starting at 9 PM tomorrow) and everolimus 0.75 mG po twice a day. Valcyte was adjusted to 450 mG PO once a day, will continue Bactrim SS 1 tablet PO daily (history of Nocardia/status post Rituxan administration), and discontinued nystatin suspension since the patient in on posaconazole now. Lokelma will be continued in the outpatient setting - 10 grams at noon - and potassium will be monitored accordingly (labs on Thursday). All questions and concerns were addressed.

## 2020-06-02 NOTE — PROGRESS NOTE ADULT - ASSESSMENT
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 and warm antibody: babesia negative, EBV negative, HCV negative, CMV negative, parvovirus negative  - plt count improving on high dose steroids  - will consider other infectious etiologies tick borne illnesses ehrlichia/Lyme (not usually a cause of anemia)- will send for  tick borne panel     # OI prophylaxis  Given the high dose steroids 70mg/day prednisone- He needs Bactrim or mepron for PJP prevention- receiving Bactrim SS/daily  Valganciclovir for CMV prevention- repeat CMV viral load 5/27 non det    # elevated fungitell >100pg/ml - pt denies cough, HA, nasal congestion  - Repeat fungitell pending  - in the meantime- in the setting of high dose steroids and immune compromised state will change antifungal medication to posaconazole  - will consider checking a head CT scan/sinus cuts    Patient can follow up with Dr Lujan when he follows up with the heart transplant team.

## 2020-06-02 NOTE — PROGRESS NOTE ADULT - SUBJECTIVE AND OBJECTIVE BOX
Interval history: No acute issues overnight. Denies chest pain/shortness of breath      Tele: SR       Current Meds:  acetaminophen   Tablet .. 650 milliGRAM(s) Oral every 6 hours PRN  aspirin enteric coated 81 milliGRAM(s) Oral daily  calcium citrate  1500 mG + Vitamin D (CITRACAL + D3 Maximum) 2 Tablet(s) Oral <User Schedule>  everolimus (ZORTRESS) 0.75 milliGRAM(s) Oral <User Schedule>  folic acid 1 milliGRAM(s) Oral <User Schedule>  magnesium oxide 400 milliGRAM(s) Oral <User Schedule>  pantoprazole    Tablet 40 milliGRAM(s) Oral before breakfast  posaconazole DR Tablet 300 milliGRAM(s) Oral <User Schedule>  pravastatin 20 milliGRAM(s) Oral at bedtime  predniSONE   Tablet 70 milliGRAM(s) Oral <User Schedule>  sodium bicarbonate 650 milliGRAM(s) Oral <User Schedule>  sodium zirconium cyclosilicate 10 Gram(s) Oral <User Schedule>  tacrolimus 3 milliGRAM(s) Oral <User Schedule>  trimethoprim   80 mG/sulfamethoxazole 400 mG 1 Tablet(s) Oral daily  valGANciclovir 450 milliGRAM(s) Oral <User Schedule>      Vitals:  T(C): 36.5 (06-01-20 @ 05:15), Max: 36.5 (05-31-20 @ 19:52)  T(F): 97.7 (06-01-20 @ 05:15), Max: 97.7 (05-31-20 @ 19:52)  HR: 111 (06-01-20 @ 05:15) (110 - 112)  BP: 130/86 (06-01-20 @ 05:15) (121/86 - 130/86)  RR: 18 (06-01-20 @ 05:15) (18 - 18)  SpO2: 100% (06-01-20 @ 05:15) (99% - 100%)      I&O's Summary    31 May 2020 07:01  -  01 Jun 2020 07:00  --------------------------------------------------------  IN: 1260 mL / OUT: 1800 mL / NET: -540 mL          Physical Exam:  Appearance: No Acute Distress  Neck: No JVD  Cardiovascular: Normal S1 S2, machine-like murmur in LLSB  Respiratory: Clear to auscultation bilaterally  Gastrointestinal: Soft, Non-tender	  Skin: No cyanosis	  Neurologic: Non-focal  Extremities: No LE edema  Psychiatry: A & O x 3, Mood & affect appropriate                          8.7    8.20  )-----------( 379      ( 01 Jun 2020 07:16 )             27.1     06-01    136  |  102  |  50<H>  ----------------------------<  212<H>  5.3   |  25  |  1.61<H>    Ca    9.1      01 Jun 2020 07:16    TPro  6.2  /  Alb  3.7  /  TBili  1.5<H>  /  DBili  x   /  AST  16  /  ALT  25  /  AlkPhos  88  06-01                  tacrolimus   4 milliGRAM(s) Oral (05-31-20 @ 08:44)   4 milliGRAM(s) Oral (05-30-20 @ 20:46)    tacrolimus   2 milliGRAM(s) Oral (05-31-20 @ 20:22)    tacrolimus   3 milliGRAM(s) Oral (06-01-20 @ 08:18)            Tacrolimus (), Serum: 16.7 ng/mL (6/2/20)  Tacrolimus (), Serum: 15.8 ng/mL (06-01-20 @ 08:10)  Tacrolimus (), Serum: 14.9 ng/mL (05-31-20 @ 09:09)  Tacrolimus (), Serum: 11.8 ng/mL (05-30-20 @ 09:18)  Tacrolimus (), Serum: 6.5 ng/mL (05-29-20 @ 08:22)  Tacrolimus (), Serum: 7.8 ng/mL (05-28-20 @ 08:17)  Tacrolimus (), Serum: 7.9 ng/mL (05-27-20 @ 08:03)

## 2020-06-02 NOTE — PROGRESS NOTE ADULT - SUBJECTIVE AND OBJECTIVE BOX
Hematology Follow-up    INTERVAL HPI/OVERNIGHT EVENTS:  Patient S&E at bedside. No o/n events, patient resting comfortably.      VITAL SIGNS:  T(F): 98 (06-02-20 @ 13:48)  HR: 131 (06-02-20 @ 13:48)  BP: 145/79 (06-02-20 @ 13:48)  RR: 18 (06-02-20 @ 13:48)  SpO2: 100% (06-02-20 @ 13:48)  Wt(kg): --      PHYSICAL EXAM:    Constitutional: AAOx3, NAD  Eyes: PERRL, EOMI, sclera non-icteric  Neck: supple, no masses, no JVD  Respiratory: CTA b/l, good air entry b/l  Cardiovascular: RRR, normal S1S2, no M/R/G  Gastrointestinal: soft, NTND, no masses palpable, BS normal in all four quadrants, no HSM  Extremities: no c/c/e  Neurological: Grossly intact  Skin: Normal temperature      MEDICATIONS  (STANDING):  aspirin enteric coated 81 milliGRAM(s) Oral daily  BACItracin   Ointment 1 Application(s) Topical two times a day  cholecalciferol 1000 Unit(s) Oral daily  everolimus (ZORTRESS) 0.75 milliGRAM(s) Oral <User Schedule>  folic acid 1 milliGRAM(s) Oral <User Schedule>  magnesium oxide 400 milliGRAM(s) Oral <User Schedule>  pantoprazole    Tablet 40 milliGRAM(s) Oral before breakfast  posaconazole DR Tablet 300 milliGRAM(s) Oral <User Schedule>  pravastatin 20 milliGRAM(s) Oral at bedtime  predniSONE   Tablet 60 milliGRAM(s) Oral <User Schedule>  sodium bicarbonate 650 milliGRAM(s) Oral <User Schedule>  sodium zirconium cyclosilicate 10 Gram(s) Oral <User Schedule>  trimethoprim   80 mG/sulfamethoxazole 400 mG 1 Tablet(s) Oral daily  valGANciclovir 450 milliGRAM(s) Oral <User Schedule>    MEDICATIONS  (PRN):  acetaminophen   Tablet .. 650 milliGRAM(s) Oral every 6 hours PRN Mild Pain (1 - 3)        No Known Allergies      LABS:                        10.2   12.04 )-----------( 409      ( 02 Jun 2020 07:26 )             30.9     06-02    135  |  100  |  46<H>  ----------------------------<  242<H>  4.8   |  22  |  1.55<H>    Ca    9.1      02 Jun 2020 07:26    TPro  6.2  /  Alb  3.7  /  TBili  1.5<H>  /  DBili  x   /  AST  16  /  ALT  25  /  AlkPhos  88  06-01     Lactate Dehydrogenase, Serum: 323 U/L (06-02 @ 07:26)      Haptoglobin, Serum: 64 mg/dL (06-01-20 @ 11:50)  Haptoglobin, Serum: 74 mg/dL (05-31-20 @ 11:40)  Haptoglobin, Serum: 81 mg/dL (05-30-20 @ 12:21)  Direct Jacky IgG: Positive (05-29-20 @ 20:36)  Direct Jacky C3: Negative (05-29-20 @ 20:36)  Direct Jacky Poly: Positive (05-29-20 @ 20:27)  Haptoglobin, Serum: 91 mg/dL (05-29-20 @ 09:31)  Haptoglobin, Serum: 88 mg/dL (05-28-20 @ 09:10)  Haptoglobin, Serum: 88 mg/dL (05-27-20 @ 10:13)  Ferritin, Serum: 591 ng/mL (05-23-20 @ 17:19)  Haptoglobin, Serum: 83 mg/dL (05-23-20 @ 12:21)  D-Dimer Assay, Quantitative: 246 ng/mL DDU (05-23-20 @ 11:15)  Direct Jacky IgG: Positive (05-22-20 @ 21:40)  Direct Jacky C3: Negative (05-22-20 @ 21:40)  Direct Jacky Poly: Positive (05-22-20 @ 18:03)        RADIOLOGY & ADDITIONAL TESTS:  Studies reviewed.

## 2020-06-03 RX ORDER — TACROLIMUS 5 MG/1
1 CAPSULE ORAL
Qty: 0 | Refills: 0 | DISCHARGE
Start: 2020-06-03

## 2020-06-05 ENCOUNTER — LABORATORY RESULT (OUTPATIENT)
Age: 70
End: 2020-06-05

## 2020-06-05 ENCOUNTER — APPOINTMENT (OUTPATIENT)
Dept: INFUSION THERAPY | Facility: HOSPITAL | Age: 70
End: 2020-06-05

## 2020-06-05 LAB
ALBUMIN SERPL ELPH-MCNC: 4.1 G/DL
ALP BLD-CCNC: 104 U/L
ALT SERPL-CCNC: 33 U/L
ANION GAP SERPL CALC-SCNC: 13 MMOL/L
AST SERPL-CCNC: 27 U/L
BASOPHILS # BLD AUTO: 0 K/UL
BASOPHILS NFR BLD AUTO: 0 %
BILIRUB SERPL-MCNC: 1.4 MG/DL
BUN SERPL-MCNC: 47 MG/DL
CALCIUM SERPL-MCNC: 8.9 MG/DL
CHLORIDE SERPL-SCNC: 107 MMOL/L
CO2 SERPL-SCNC: 21 MMOL/L
CREAT SERPL-MCNC: 1.87 MG/DL
EOSINOPHIL # BLD AUTO: 0 K/UL
EOSINOPHIL NFR BLD AUTO: 0 %
GLUCOSE SERPL-MCNC: 185 MG/DL
HCT VFR BLD CALC: 27.2 %
HGB BLD-MCNC: 8.6 G/DL
LYMPHOCYTES # BLD AUTO: 0.25 K/UL
LYMPHOCYTES NFR BLD AUTO: 3.4 %
MAGNESIUM SERPL-MCNC: 2.2 MG/DL
MAN DIFF?: NORMAL
MCHC RBC-ENTMCNC: 29.6 PG
MCHC RBC-ENTMCNC: 31.6 GM/DL
MCV RBC AUTO: 93.5 FL
MONOCYTES # BLD AUTO: 0.39 K/UL
MONOCYTES NFR BLD AUTO: 5.2 %
NEUTROPHILS # BLD AUTO: 6.72 K/UL
NEUTROPHILS NFR BLD AUTO: 90.5 %
PLATELET # BLD AUTO: 334 K/UL
POTASSIUM SERPL-SCNC: 4.9 MMOL/L
PROT SERPL-MCNC: 6.4 G/DL
RBC # BLD: 2.91 M/UL
RBC # FLD: 15.9 %
SODIUM SERPL-SCNC: 140 MMOL/L
TACROLIMUS SERPL-MCNC: 8.8 NG/ML
WBC # FLD AUTO: 7.42 K/UL

## 2020-06-08 LAB — CHROM ANALY OVERALL INTERP SPEC-IMP: SIGNIFICANT CHANGE UP

## 2020-06-09 ENCOUNTER — OUTPATIENT (OUTPATIENT)
Dept: OUTPATIENT SERVICES | Facility: HOSPITAL | Age: 70
LOS: 1 days | Discharge: HOME | End: 2020-06-09

## 2020-06-09 DIAGNOSIS — Z98.890 OTHER SPECIFIED POSTPROCEDURAL STATES: Chronic | ICD-10-CM

## 2020-06-09 DIAGNOSIS — Z94.1 HEART TRANSPLANT STATUS: Chronic | ICD-10-CM

## 2020-06-09 DIAGNOSIS — D64.9 ANEMIA, UNSPECIFIED: ICD-10-CM

## 2020-06-10 ENCOUNTER — LABORATORY RESULT (OUTPATIENT)
Age: 70
End: 2020-06-10

## 2020-06-10 ENCOUNTER — NON-APPOINTMENT (OUTPATIENT)
Age: 70
End: 2020-06-10

## 2020-06-10 ENCOUNTER — APPOINTMENT (OUTPATIENT)
Dept: HEART FAILURE | Facility: CLINIC | Age: 70
End: 2020-06-10
Payer: MEDICARE

## 2020-06-10 VITALS
HEIGHT: 67.9 IN | SYSTOLIC BLOOD PRESSURE: 130 MMHG | DIASTOLIC BLOOD PRESSURE: 85 MMHG | RESPIRATION RATE: 12 BRPM | WEIGHT: 167 LBS | TEMPERATURE: 98.8 F | OXYGEN SATURATION: 93 % | HEART RATE: 125 BPM | BODY MASS INDEX: 25.61 KG/M2

## 2020-06-10 DIAGNOSIS — Z86.19 PERSONAL HISTORY OF OTHER INFECTIOUS AND PARASITIC DISEASES: ICD-10-CM

## 2020-06-10 DIAGNOSIS — I10 ESSENTIAL (PRIMARY) HYPERTENSION: ICD-10-CM

## 2020-06-10 LAB
ALBUMIN SERPL ELPH-MCNC: 4.1 G/DL
ALP BLD-CCNC: 121 U/L
ALT SERPL-CCNC: 49 U/L
ANION GAP SERPL CALC-SCNC: 10 MMOL/L
AST SERPL-CCNC: 29 U/L
BASOPHILS # BLD AUTO: 0 K/UL
BASOPHILS NFR BLD AUTO: 0 %
BILIRUB SERPL-MCNC: 1.3 MG/DL
BUN SERPL-MCNC: 38 MG/DL
CALCIUM SERPL-MCNC: 8.8 MG/DL
CHLORIDE SERPL-SCNC: 108 MMOL/L
CO2 SERPL-SCNC: 23 MMOL/L
CREAT SERPL-MCNC: 1.53 MG/DL
EOSINOPHIL # BLD AUTO: 0 K/UL
EOSINOPHIL NFR BLD AUTO: 0 %
EVEROLIMUS BLD-MCNC: 8.3 NG/ML
GLUCOSE SERPL-MCNC: 224 MG/DL
HCT VFR BLD CALC: 27.8 %
HGB BLD-MCNC: 9 G/DL
LYMPHOCYTES # BLD AUTO: 0.42 K/UL
LYMPHOCYTES NFR BLD AUTO: 4.6 %
MAGNESIUM SERPL-MCNC: 2 MG/DL
MAN DIFF?: NORMAL
MCHC RBC-ENTMCNC: 30.4 PG
MCHC RBC-ENTMCNC: 32.4 GM/DL
MCV RBC AUTO: 93.9 FL
MONOCYTES # BLD AUTO: 0.42 K/UL
MONOCYTES NFR BLD AUTO: 4.6 %
NEUTROPHILS # BLD AUTO: 8.33 K/UL
NEUTROPHILS NFR BLD AUTO: 90.8 %
PLATELET # BLD AUTO: 158 K/UL
POTASSIUM SERPL-SCNC: 4 MMOL/L
PROT SERPL-MCNC: 6.3 G/DL
RBC # BLD: 2.96 M/UL
RBC # FLD: 16 %
SODIUM SERPL-SCNC: 141 MMOL/L
TACROLIMUS SERPL-MCNC: 7.7 NG/ML
WBC # FLD AUTO: 9.17 K/UL

## 2020-06-10 PROCEDURE — 99214 OFFICE O/P EST MOD 30 MIN: CPT

## 2020-06-10 NOTE — DISCUSSION/SUMMARY
[FreeTextEntry1] : - Immunosuppression/Autoimmune hemolytic anemia: Given therapeutic everolimus level, we will discontinue the tacrolimus. Goal dose everolimus level 6-8. He remains off of CellCept. As the prednisone is weaned for treatment of hemolytic anemia, we will resume Cellcept. He will follow-up with heme. \par - S/P Heart transplantation: He has no evidence of clinically significant graft dysfunction or congestive heart failure on exam. 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 He has no significant CAV. \par - History of stage III CKD with RTA: Stable. \par - Antibiotic prophylaxis:CMV (intermediate risk): Continue valganciclovir. Continue Bactrim. \par - CAV prophylaxis/hyperlipidemia: Continue ASA 81mg daily and pravachol 20 mg daily.\par - Ulcer prophylaxis: Continue famotidine 20mg daily.\par - Osteoporosis prophylaxis: Continue calcium + vit D 2 tabs BID.\par - Hypomagnesemia: Continue magnesium oxide 1200 mg daily.\par - Elevated PSA: Stable.\par - Chronic right knee pain, osteoarthritis: Continue to monitor.

## 2020-06-10 NOTE — ASSESSMENT
[FreeTextEntry1] : Mr. Baez is a 70 year old man with history of NICM, s/p heart transplantation on 2/23/18. 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 Early post transplant he received therapy with IVIG, plasmapheresis, and rituximab with course complicated by development of Nocardia pulmonary infection, which is resolved. He more recently developed autoimmune hemolytic anemia, possibly due to tacrolimus. 
none

## 2020-06-10 NOTE — PHYSICAL EXAM
[General Appearance - Well Developed] : well developed [General Appearance - Well Nourished] : well nourished [Normal Conjunctiva] : the conjunctiva exhibited no abnormalities [Normal Oral Mucosa] : normal oral mucosa [Normal Oropharynx] : normal oropharynx [Respiration, Rhythm And Depth] : normal respiratory rhythm and effort [Auscultation Breath Sounds / Voice Sounds] : lungs were clear to auscultation bilaterally [Heart Rate And Rhythm] : heart rate and rhythm were normal [Heart Sounds] : normal S1 and S2 [Edema] : no peripheral edema present [Arterial Pulses Normal] : the arterial pulses were normal [Bowel Sounds] : normal bowel sounds [Abdomen Soft] : soft [Abdomen Tenderness] : non-tender [Abnormal Walk] : normal gait [Nail Clubbing] : no clubbing of the fingernails [Cyanosis, Localized] : no localized cyanosis [Skin Color & Pigmentation] : normal skin color and pigmentation [Skin Turgor] : normal skin turgor [] : no rash [Oriented To Time, Place, And Person] : oriented to person, place, and time [Impaired Insight] : insight and judgment were intact [No Anxiety] : not feeling anxious [FreeTextEntry1] : Regular, but tachycardic. III/VI diastolic murmur.

## 2020-06-11 RX ORDER — TACROLIMUS 0.5 MG/1
0.5 CAPSULE ORAL TWICE DAILY
Qty: 120 | Refills: 5 | Status: DISCONTINUED | COMMUNITY
Start: 2020-06-01 | End: 2020-06-10

## 2020-06-12 ENCOUNTER — APPOINTMENT (OUTPATIENT)
Dept: INFUSION THERAPY | Facility: HOSPITAL | Age: 70
End: 2020-06-12

## 2020-06-15 ENCOUNTER — APPOINTMENT (OUTPATIENT)
Dept: HEMATOLOGY ONCOLOGY | Facility: CLINIC | Age: 70
End: 2020-06-15

## 2020-06-15 LAB — EVEROLIMUS BLD-MCNC: 8 NG/ML

## 2020-06-16 ENCOUNTER — EMERGENCY (EMERGENCY)
Facility: HOSPITAL | Age: 70
LOS: 1 days | Discharge: HOME | End: 2020-06-16
Attending: EMERGENCY MEDICINE | Admitting: EMERGENCY MEDICINE
Payer: MEDICARE

## 2020-06-16 ENCOUNTER — INPATIENT (INPATIENT)
Facility: HOSPITAL | Age: 70
LOS: 2 days | Discharge: HOME | DRG: 638 | End: 2020-06-19
Attending: THORACIC SURGERY (CARDIOTHORACIC VASCULAR SURGERY) | Admitting: HOSPITALIST
Payer: MEDICARE

## 2020-06-16 VITALS
HEART RATE: 120 BPM | OXYGEN SATURATION: 99 % | RESPIRATION RATE: 18 BRPM | SYSTOLIC BLOOD PRESSURE: 135 MMHG | DIASTOLIC BLOOD PRESSURE: 88 MMHG | TEMPERATURE: 98 F

## 2020-06-16 VITALS
SYSTOLIC BLOOD PRESSURE: 160 MMHG | WEIGHT: 160.94 LBS | RESPIRATION RATE: 18 BRPM | HEART RATE: 116 BPM | DIASTOLIC BLOOD PRESSURE: 89 MMHG | OXYGEN SATURATION: 97 % | TEMPERATURE: 98 F | HEIGHT: 68 IN

## 2020-06-16 DIAGNOSIS — Z94.1 HEART TRANSPLANT STATUS: Chronic | ICD-10-CM

## 2020-06-16 DIAGNOSIS — Z98.890 OTHER SPECIFIED POSTPROCEDURAL STATES: Chronic | ICD-10-CM

## 2020-06-16 DIAGNOSIS — R73.9 HYPERGLYCEMIA, UNSPECIFIED: ICD-10-CM

## 2020-06-16 LAB
ALBUMIN SERPL ELPH-MCNC: 3.6 G/DL — SIGNIFICANT CHANGE UP (ref 3.3–5)
ALBUMIN SERPL ELPH-MCNC: 4.2 G/DL — SIGNIFICANT CHANGE UP (ref 3.3–5)
ALP SERPL-CCNC: 118 U/L — SIGNIFICANT CHANGE UP (ref 40–120)
ALP SERPL-CCNC: 134 U/L — HIGH (ref 40–120)
ALT FLD-CCNC: 37 U/L — SIGNIFICANT CHANGE UP (ref 10–45)
ALT FLD-CCNC: 50 U/L — HIGH (ref 4–41)
ANION GAP SERPL CALC-SCNC: 14 MMO/L — SIGNIFICANT CHANGE UP (ref 7–14)
ANION GAP SERPL CALC-SCNC: 16 MMOL/L — SIGNIFICANT CHANGE UP (ref 5–17)
AST SERPL-CCNC: 45 U/L — HIGH (ref 10–40)
AST SERPL-CCNC: 49 U/L — HIGH (ref 4–40)
B-OH-BUTYR SERPL-SCNC: < 0 MMOL/L — LOW (ref 0–0.4)
BASE EXCESS BLDV CALC-SCNC: -1.1 MMOL/L — SIGNIFICANT CHANGE UP
BASOPHILS # BLD AUTO: 0 K/UL — SIGNIFICANT CHANGE UP (ref 0–0.2)
BASOPHILS NFR BLD AUTO: 0 % — SIGNIFICANT CHANGE UP (ref 0–2)
BILIRUB SERPL-MCNC: 0.9 MG/DL — SIGNIFICANT CHANGE UP (ref 0.2–1.2)
BILIRUB SERPL-MCNC: 0.9 MG/DL — SIGNIFICANT CHANGE UP (ref 0.2–1.2)
BLOOD GAS VENOUS - CREATININE: 1.53 MG/DL — HIGH (ref 0.5–1.3)
BLOOD GAS VENOUS - FIO2: 21 — SIGNIFICANT CHANGE UP
BUN SERPL-MCNC: 29 MG/DL — HIGH (ref 7–23)
BUN SERPL-MCNC: 31 MG/DL — HIGH (ref 7–23)
CALCIUM SERPL-MCNC: 8.1 MG/DL — LOW (ref 8.4–10.5)
CALCIUM SERPL-MCNC: 8.4 MG/DL — SIGNIFICANT CHANGE UP (ref 8.4–10.5)
CHLORIDE BLDV-SCNC: 103 MMOL/L — SIGNIFICANT CHANGE UP (ref 96–108)
CHLORIDE SERPL-SCNC: 101 MMOL/L — SIGNIFICANT CHANGE UP (ref 96–108)
CHLORIDE SERPL-SCNC: 101 MMOL/L — SIGNIFICANT CHANGE UP (ref 98–107)
CO2 SERPL-SCNC: 18 MMOL/L — LOW (ref 22–31)
CO2 SERPL-SCNC: 21 MMOL/L — LOW (ref 22–31)
CREAT SERPL-MCNC: 1.35 MG/DL — HIGH (ref 0.5–1.3)
CREAT SERPL-MCNC: 1.44 MG/DL — HIGH (ref 0.5–1.3)
EOSINOPHIL # BLD AUTO: 0 K/UL — SIGNIFICANT CHANGE UP (ref 0–0.5)
EOSINOPHIL NFR BLD AUTO: 0 % — SIGNIFICANT CHANGE UP (ref 0–6)
GAS PNL BLDV: 137 MMOL/L — SIGNIFICANT CHANGE UP (ref 136–146)
GLUCOSE BLDC GLUCOMTR-MCNC: 357 MG/DL — HIGH (ref 70–99)
GLUCOSE BLDC GLUCOMTR-MCNC: 464 MG/DL — CRITICAL HIGH (ref 70–99)
GLUCOSE BLDC GLUCOMTR-MCNC: 470 MG/DL — CRITICAL HIGH (ref 70–99)
GLUCOSE BLDV-MCNC: 544 MG/DL — CRITICAL HIGH (ref 70–99)
GLUCOSE SERPL-MCNC: 451 MG/DL — CRITICAL HIGH (ref 70–99)
GLUCOSE SERPL-MCNC: 531 MG/DL — CRITICAL HIGH (ref 70–99)
HBA1C BLD-MCNC: 8.2 % — HIGH (ref 4–5.6)
HCO3 BLDV-SCNC: 23 MMOL/L — SIGNIFICANT CHANGE UP (ref 20–27)
HCT VFR BLD CALC: 25.9 % — LOW (ref 39–50)
HCT VFR BLDV CALC: 27.8 % — LOW (ref 39–51)
HGB BLD-MCNC: 8.9 G/DL — LOW (ref 13–17)
HGB BLDV-MCNC: 9 G/DL — LOW (ref 13–17)
IMM GRANULOCYTES NFR BLD AUTO: 1.2 % — SIGNIFICANT CHANGE UP (ref 0–1.5)
LACTATE BLDV-MCNC: 2.6 MMOL/L — HIGH (ref 0.5–2)
LYMPHOCYTES # BLD AUTO: 0.05 K/UL — LOW (ref 1–3.3)
LYMPHOCYTES # BLD AUTO: 1 % — LOW (ref 13–44)
MAGNESIUM SERPL-MCNC: 2.1 MG/DL — SIGNIFICANT CHANGE UP (ref 1.6–2.6)
MCHC RBC-ENTMCNC: 31.1 PG — SIGNIFICANT CHANGE UP (ref 27–34)
MCHC RBC-ENTMCNC: 34.4 % — SIGNIFICANT CHANGE UP (ref 32–36)
MCV RBC AUTO: 90.6 FL — SIGNIFICANT CHANGE UP (ref 80–100)
MONOCYTES # BLD AUTO: 0.03 K/UL — SIGNIFICANT CHANGE UP (ref 0–0.9)
MONOCYTES NFR BLD AUTO: 0.6 % — LOW (ref 2–14)
NEUTROPHILS # BLD AUTO: 4.67 K/UL — SIGNIFICANT CHANGE UP (ref 1.8–7.4)
NEUTROPHILS NFR BLD AUTO: 97.2 % — HIGH (ref 43–77)
NRBC # FLD: 0 K/UL — SIGNIFICANT CHANGE UP (ref 0–0)
PCO2 BLDV: 46 MMHG — SIGNIFICANT CHANGE UP (ref 41–51)
PH BLDV: 7.34 PH — SIGNIFICANT CHANGE UP (ref 7.32–7.43)
PHOSPHATE SERPL-MCNC: 3 MG/DL — SIGNIFICANT CHANGE UP (ref 2.5–4.5)
PLATELET # BLD AUTO: 113 K/UL — LOW (ref 150–400)
PMV BLD: 11 FL — SIGNIFICANT CHANGE UP (ref 7–13)
PO2 BLDV: 37 MMHG — SIGNIFICANT CHANGE UP (ref 35–40)
POTASSIUM BLDV-SCNC: 3.7 MMOL/L — SIGNIFICANT CHANGE UP (ref 3.4–4.5)
POTASSIUM SERPL-MCNC: 3.8 MMOL/L — SIGNIFICANT CHANGE UP (ref 3.5–5.3)
POTASSIUM SERPL-MCNC: 4.1 MMOL/L — SIGNIFICANT CHANGE UP (ref 3.5–5.3)
POTASSIUM SERPL-SCNC: 3.8 MMOL/L — SIGNIFICANT CHANGE UP (ref 3.5–5.3)
POTASSIUM SERPL-SCNC: 4.1 MMOL/L — SIGNIFICANT CHANGE UP (ref 3.5–5.3)
PROT SERPL-MCNC: 6 G/DL — SIGNIFICANT CHANGE UP (ref 6–8.3)
PROT SERPL-MCNC: 6.6 G/DL — SIGNIFICANT CHANGE UP (ref 6–8.3)
RBC # BLD: 2.86 M/UL — LOW (ref 4.2–5.8)
RBC # FLD: 15.6 % — HIGH (ref 10.3–14.5)
SAO2 % BLDV: 63.8 % — SIGNIFICANT CHANGE UP (ref 60–85)
SARS-COV-2 RNA SPEC QL NAA+PROBE: SIGNIFICANT CHANGE UP
SODIUM SERPL-SCNC: 135 MMOL/L — SIGNIFICANT CHANGE UP (ref 135–145)
SODIUM SERPL-SCNC: 136 MMOL/L — SIGNIFICANT CHANGE UP (ref 135–145)
WBC # BLD: 4.81 K/UL — SIGNIFICANT CHANGE UP (ref 3.8–10.5)
WBC # FLD AUTO: 4.81 K/UL — SIGNIFICANT CHANGE UP (ref 3.8–10.5)

## 2020-06-16 PROCEDURE — 99285 EMERGENCY DEPT VISIT HI MDM: CPT | Mod: CS,GC

## 2020-06-16 PROCEDURE — 71045 X-RAY EXAM CHEST 1 VIEW: CPT | Mod: 26

## 2020-06-16 PROCEDURE — 99284 EMERGENCY DEPT VISIT MOD MDM: CPT | Mod: GC

## 2020-06-16 PROCEDURE — 99223 1ST HOSP IP/OBS HIGH 75: CPT

## 2020-06-16 PROCEDURE — 93010 ELECTROCARDIOGRAM REPORT: CPT

## 2020-06-16 RX ORDER — INSULIN LISPRO 100/ML
VIAL (ML) SUBCUTANEOUS EVERY 4 HOURS
Refills: 0 | Status: DISCONTINUED | OUTPATIENT
Start: 2020-06-16 | End: 2020-06-17

## 2020-06-16 RX ORDER — SODIUM CHLORIDE 9 MG/ML
3 INJECTION INTRAMUSCULAR; INTRAVENOUS; SUBCUTANEOUS EVERY 8 HOURS
Refills: 0 | Status: DISCONTINUED | OUTPATIENT
Start: 2020-06-16 | End: 2020-06-19

## 2020-06-16 RX ORDER — POSACONAZOLE 100 MG/1
300 TABLET, DELAYED RELEASE ORAL DAILY
Refills: 0 | Status: DISCONTINUED | OUTPATIENT
Start: 2020-06-17 | End: 2020-06-19

## 2020-06-16 RX ORDER — SODIUM BICARBONATE 1 MEQ/ML
650 SYRINGE (ML) INTRAVENOUS
Refills: 0 | Status: DISCONTINUED | OUTPATIENT
Start: 2020-06-16 | End: 2020-06-19

## 2020-06-16 RX ORDER — EVEROLIMUS 10 MG/1
0.75 TABLET ORAL
Refills: 0 | Status: DISCONTINUED | OUTPATIENT
Start: 2020-06-16 | End: 2020-06-19

## 2020-06-16 RX ORDER — VALGANCICLOVIR 450 MG/1
450 TABLET, FILM COATED ORAL DAILY
Refills: 0 | Status: DISCONTINUED | OUTPATIENT
Start: 2020-06-17 | End: 2020-06-19

## 2020-06-16 RX ORDER — ASPIRIN/CALCIUM CARB/MAGNESIUM 324 MG
81 TABLET ORAL DAILY
Refills: 0 | Status: DISCONTINUED | OUTPATIENT
Start: 2020-06-17 | End: 2020-06-19

## 2020-06-16 RX ORDER — INSULIN LISPRO 100/ML
6 VIAL (ML) SUBCUTANEOUS ONCE
Refills: 0 | Status: COMPLETED | OUTPATIENT
Start: 2020-06-16 | End: 2020-06-16

## 2020-06-16 RX ORDER — CHOLECALCIFEROL (VITAMIN D3) 125 MCG
1000 CAPSULE ORAL DAILY
Refills: 0 | Status: DISCONTINUED | OUTPATIENT
Start: 2020-06-17 | End: 2020-06-19

## 2020-06-16 RX ORDER — SODIUM CHLORIDE 9 MG/ML
250 INJECTION INTRAMUSCULAR; INTRAVENOUS; SUBCUTANEOUS ONCE
Refills: 0 | Status: DISCONTINUED | OUTPATIENT
Start: 2020-06-16 | End: 2020-06-20

## 2020-06-16 RX ORDER — INSULIN GLARGINE 100 [IU]/ML
14 INJECTION, SOLUTION SUBCUTANEOUS ONCE
Refills: 0 | Status: COMPLETED | OUTPATIENT
Start: 2020-06-16 | End: 2020-06-16

## 2020-06-16 RX ORDER — ATORVASTATIN CALCIUM 80 MG/1
10 TABLET, FILM COATED ORAL AT BEDTIME
Refills: 0 | Status: DISCONTINUED | OUTPATIENT
Start: 2020-06-16 | End: 2020-06-16

## 2020-06-16 RX ORDER — ACETAMINOPHEN 500 MG
650 TABLET ORAL EVERY 6 HOURS
Refills: 0 | Status: DISCONTINUED | OUTPATIENT
Start: 2020-06-16 | End: 2020-06-19

## 2020-06-16 RX ORDER — MAGNESIUM OXIDE 400 MG ORAL TABLET 241.3 MG
400 TABLET ORAL DAILY
Refills: 0 | Status: DISCONTINUED | OUTPATIENT
Start: 2020-06-16 | End: 2020-06-19

## 2020-06-16 RX ORDER — SODIUM CHLORIDE 9 MG/ML
500 INJECTION, SOLUTION INTRAVENOUS ONCE
Refills: 0 | Status: COMPLETED | OUTPATIENT
Start: 2020-06-16 | End: 2020-06-16

## 2020-06-16 RX ORDER — FOLIC ACID 0.8 MG
1 TABLET ORAL DAILY
Refills: 0 | Status: DISCONTINUED | OUTPATIENT
Start: 2020-06-16 | End: 2020-06-19

## 2020-06-16 RX ORDER — PANTOPRAZOLE SODIUM 20 MG/1
40 TABLET, DELAYED RELEASE ORAL
Refills: 0 | Status: DISCONTINUED | OUTPATIENT
Start: 2020-06-17 | End: 2020-06-19

## 2020-06-16 RX ADMIN — Medication 6 UNIT(S): at 17:51

## 2020-06-16 RX ADMIN — SODIUM CHLORIDE 3 MILLILITER(S): 9 INJECTION INTRAMUSCULAR; INTRAVENOUS; SUBCUTANEOUS at 21:58

## 2020-06-16 RX ADMIN — Medication 20 MILLIGRAM(S): at 21:58

## 2020-06-16 RX ADMIN — SODIUM CHLORIDE 500 MILLILITER(S): 9 INJECTION, SOLUTION INTRAVENOUS at 17:51

## 2020-06-16 RX ADMIN — INSULIN GLARGINE 14 UNIT(S): 100 INJECTION, SOLUTION SUBCUTANEOUS at 19:29

## 2020-06-16 RX ADMIN — Medication 5: at 21:09

## 2020-06-16 RX ADMIN — EVEROLIMUS 0.75 MILLIGRAM(S): 10 TABLET ORAL at 21:58

## 2020-06-16 NOTE — ED PROVIDER NOTE - ATTENDING CONTRIBUTION TO CARE
Patient transferred from [facility] for heart failure evaluation.  Patient with history of cardiac transplant in 2018, follows with Dr. Stahl at [facility], noted over the past 2-3 days having significantly elevated blood glucose at home despite medications.  Contacted Dr. Stahl who told him to come to [facility], he went to [facility] instead.  There labs obtained and he was noted to be hyperglycemic but not acidotic, normal anion gap, no ketosis.  Patient reports having increased thirst and urination at home.  Denying fevers, chills, nausea, vomiting, abdominal pains, chest pains, difficulty breathing.    A 14 point review of systems is negative except as in HPI or otherwise documented.    Exam:  General: Patient well appearing, tachycardic otherwise vital signs within normal limits  HEENT: airway patent with moist mucous membranes  Cardiac: regular tachycardia S1/S2 with strong peripheral pulses  Respiratory: lungs clear without respiratory distress  GI: abdomen soft, non tender, non distended  Neuro: no gross neurologic deficits  Skin: warm, well perfused  Psych: normal mood and affect    Patient transferred from [facility] for admission for hyperglycemia and cardiology management, labs already obtained at [facility] 2 hours ago, do not need to be repeated at this time, will start blood glucose management with insulin, consult heart failure team, admit.

## 2020-06-16 NOTE — ED PROVIDER NOTE - NS ED ROS FT
GENERAL: No fever or chills  EYES: no change in vision  HEENT: no trouble swallowing or speaking  CARDIAC: no chest pain or palpiations   PULMONARY: no cough or SOB  GI: no abdominal pain, nausea, vomiting, diarrhea, or constipation   : No changes in urination  SKIN: no rashes  NEURO: no headache, numbness, or weakness.  MSK: No joint pain

## 2020-06-16 NOTE — ED ADULT NURSE REASSESSMENT NOTE - NS ED NURSE REASSESS COMMENT FT1
VSS, 6 units Humalog insulin administered, IVF infusing. Pt A+Ox3, aware of plan of care, admitted to telemetry for hyperglycemia. Pt transported to [address] [place] [address] and report given to DIXON Bourne.

## 2020-06-16 NOTE — H&P ADULT - NSHPREVIEWOFSYSTEMS_GEN_ALL_CORE
REVIEW OF SYSTEMS:  CONSTITUTIONAL: Denies fever, weight loss, or fatigue  EYES: Denies eye pain, visual disturbances, or discharge  RESPIRATORY: Denies SOB, cough, wheezing, chills or hemoptysis  CARDIOVASCULAR: Denies chest pain, palpitations, dizziness, or leg swelling  GASTROINTESTINAL: Denies abdominal pain, nausea, vomiting, hematemesis, diarrhea, melena  GENITOURINARY: Denies dysuria, frequency, hematuria, or incontinence  NEUROLOGICAL: Denies headaches, memory loss, loss of strength, numbness, or tremors  SKIN: Denies itching, burning, rashes, or lesions  ENDOCRINE: Denies heat or cold intolerance, hair loss  MUSCULOSKELETAL: Denies joint pain or swelling, muscle, back, or extremity pain  PSYCHIATRIC: Denies depression, anxiety, mood swings, or difficulty sleeping  HEME/LYMPH: Denies easy bruising, denies bleeding gums and mucous membranes

## 2020-06-16 NOTE — ED PROVIDER NOTE - PMH
CHF (Congestive Heart Failure)    DVT of upper extremity (deep vein thrombosis)    Former smoker    GIB (gastrointestinal bleeding)    H/O autoimmune hemolytic anemia    Hepatitis C virus    HLD (hyperlipidemia)    HTN    Knee pain, right    Non-Ischemic Cardiomyopathy    PAF (paroxysmal atrial fibrillation)  on xarelto  SVT (Supraventricular Tachycardia)    Ventricular fibrillation  s/p AICD

## 2020-06-16 NOTE — ED PROVIDER NOTE - OBJECTIVE STATEMENT
70y m PMHx CHF s/p cardiac transplant 2018, non-ischemic cardiomyopathy, CKD (Cr. 1.4), aortic stenosis, GI bleed, autoimmune hemolytic anemia s/p 1 dose of Rituxan sent by PCP for hyperglycemia. Patient checks fingersticks at home and has noticed higher glucose levels in the last 3 days, today in the 400s. Denies polyuria, reports urinating more frequently at night but during the day. No abdominal pain / nausea. No recent infections/ no sore throat, cough, dysuria, nor recent surgeries, no recent alcohol. Was on prednisone dc'd las t week.

## 2020-06-16 NOTE — H&P ADULT - NSHPSOCIALHISTORY_GEN_ALL_CORE
SOCIAL HISTORY:  Former Smoker: [X] Yes  [ ] No                           ETOH use: [ ] Yes  [X] No               Ilicit Drug use:  [ ] Yes  [ X] No  Occupation: Retired   Live with: Alone   Assist device use: Ambulates with cane

## 2020-06-16 NOTE — ED PROVIDER NOTE - ATTENDING CONTRIBUTION TO CARE
Dr. Roberto:  I have personally performed a face to face bedside history and physical examination of this patient. I have discussed the history, examination, review of systems, assessment and plan of management with the resident. I have reviewed the electronic medical record and amended it to reflect my history, review of systems, physical exam, assessment and plan.    70M h/o CHF s/p heart transplant 2018, CKD, aortic stenosis, GIB, autoimmune hemolytic anemia, presents with hyperglycemia of FS 400s.  Pt states he feels well, although does endorse polyuria particularly at night.  Denies fever/chills ,cp ,sob, n/v/d, abd pain.    Exam:  - nad  - mildly tachy  - ctab   -abd soft ntnd    A/P  - hyperglycemia, possibly steroid induced  - cbc, cmp, vbg, h1c, beta hydroxybutyrate, COVID

## 2020-06-16 NOTE — ED ADULT TRIAGE NOTE - CHIEF COMPLAINT QUOTE
Arrives via EMS with high blood glucose.  Pt denies hx of diabetes, but checks blood glucose on regular basis

## 2020-06-16 NOTE — ED PROVIDER NOTE - OBJECTIVE STATEMENT
70M with pmh CHF s/p cardiac transplant 2018, cardiomyopathy, CKD, AS, autoimmune hemolytic anemia presenting as transfer from [facility] for hyperglycemia and admission. Patient noted past 3 days elevated glucose levels and polyuria. Only new medication received was Rituxan recently. No longer on prednisone since last week. No report of fever, chills, cp, sob, nausea, vomiting.

## 2020-06-16 NOTE — ED PROVIDER NOTE - CLINICAL SUMMARY MEDICAL DECISION MAKING FREE TEXT BOX
Patient presenting as transfer from [facility] for hyperglycemia and to be seen by heart failure team. Patient with no acute complaints. Unclear etiology of hyperglycemia, possible 2/2 rituxan infusion. Will discuss with heart failure and admit,

## 2020-06-16 NOTE — ED PROVIDER NOTE - CLINICAL SUMMARY MEDICAL DECISION MAKING FREE TEXT BOX
70y m PMHx CHF s/p cardiac transplant 2018, non-ischemic cardiomyopathy, CKD (Cr. 1.4), aortic stenosis, GI bleed, autoimmune hemolytic anemia s/p 1 dose of Rituxan sent by PCP for hyperglycemia. Tachycardic, otherwise VS WNL. Will evaluate for possible DKA. Will get basic labs, vbg, UA, beta hydroxy. No obvious precipitating factors.

## 2020-06-16 NOTE — H&P ADULT - PROBLEM SELECTOR PLAN 2
Heart failure following  continue everolimus    Continue Valcyte 450mg daily for CMV prophylaxis.  Continue Bactrim DS daily   Resume ASA 81mg daily  Continue MgOx for medication- induced hypomagnesemia  will touch base with heart failure in regards to prednisone taper in the setting of acute hyperglycemia

## 2020-06-16 NOTE — H&P ADULT - PROBLEM SELECTOR PLAN 1
house endocrinology consulted, recs noted  glycemic control  basal lantus starting tonight 14 units  low corrective sliding scale q4 with goal of BS < 250 then transition to premeal low dose sliding scale  4 units humalog pre-meal TID  serum BHB negative, no anion gap  diabetic diet  replete electrolytes as indicated  keep K >4 and Mg >2  check serial chem panel

## 2020-06-16 NOTE — ED PROVIDER NOTE - PHYSICAL EXAMINATION
GEN: NAD, awake, well appearing  HEENT: NCAT, MMM, normal conjunctiva, perrl  CHEST/LUNGS: Non-tachypneic, CTAB, bilateral breath sounds  CARDIAC: Non-tachycardic, s1s2, normal perfusion  ABDOMEN: Soft, NTND, No rebound/guarding  MSK: No joint tenderness, no gross deformity of extremities  SKIN: No rashes, no petechiae, no vesicles  NEURO: CN grossly intact, normal coordination, no focal motor or sensory deficits  PSYCH: Alert, appropriate, cooperative GEN: NAD, awake, well appearing  HEENT: NCAT, MMM, normal conjunctiva, perrl  CHEST/LUNGS: Non-tachypneic, CTAB, bilateral breath sounds  CARDIAC: tachycardic, s1s2, normal perfusion  ABDOMEN: Soft, NTND, No rebound/guarding  MSK: No joint tenderness, no gross deformity of extremities  SKIN: No rashes, no petechiae, no vesicles  NEURO: CN grossly intact, normal coordination, no focal motor or sensory deficits  PSYCH: Alert, appropriate, cooperative

## 2020-06-16 NOTE — H&P ADULT - NSICDXPASTSURGICALHX_GEN_ALL_CORE_FT
PAST SURGICAL HISTORY:  AICD (Automatic Cardioverter/Defibrillator) Present [employer] [place] with 1 [employer] [place] lead4/1/09- explanted and replaced with [employer] 2 leads on 9/2/09    H/O heart transplant 2/2018    History of Prior Ablation Treatment for afib    S/P right heart catheterization biopsy multiple    Status post left hip replacement

## 2020-06-16 NOTE — H&P ADULT - ASSESSMENT
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 Patient was treated w/ prednisone and Rituximab. Tacro was discontinued and patient was also transitioned to everolimus. Patient now presents with hyperglycemia blood sugar 400-500s. Endocrinology consulted and patient was admitted to [facility] telemetry.

## 2020-06-16 NOTE — H&P ADULT - NSICDXPASTMEDICALHX_GEN_ALL_CORE_FT
PAST MEDICAL HISTORY:  CHF (Congestive Heart Failure)     DVT of upper extremity (deep vein thrombosis)     Former smoker     GIB (gastrointestinal bleeding)     H/O autoimmune hemolytic anemia     H/O hemolytic anemia     Hepatitis C virus     HLD (hyperlipidemia)     HTN     Knee pain, right     Non-Ischemic Cardiomyopathy     PAF (paroxysmal atrial fibrillation) on xarelto    SVT (Supraventricular Tachycardia)     Ventricular fibrillation s/p AICD

## 2020-06-16 NOTE — ED ADULT NURSE NOTE - PSH
AICD (Automatic Cardioverter/Defibrillator) Present  [employer] with 1 [employer] lead4/1/09- explanted and replaced with [employer] DBtronic 2 leads on 9/2/09  H/O heart transplant  2/2018  History of Prior Ablation Treatment  for afib  S/P right heart catheterization  biopsy multiple  Status post left hip replacement

## 2020-06-16 NOTE — ED ADULT NURSE NOTE - NSIMPLEMENTINTERV_GEN_ALL_ED
Implemented All Universal Safety Interventions:  [place] to call system. Call bell, personal items and telephone within reach. Instruct patient to call for assistance. Room bathroom lighting operational. Non-slip footwear when patient is off stretcher. Physically safe environment: no spills, clutter or unnecessary equipment. Stretcher in lowest position, wheels locked, appropriate side rails in place.

## 2020-06-16 NOTE — H&P ADULT - NSHPLABSRESULTS_GEN_ALL_CORE
LABS:                8.9                  136  | 21   | 29           4.81  >-----------< 113     ------------------------< 531                   25.9                 4.1  | 101  | 1.44                                         Ca 8.1   Mg x     Ph x            VITAL SIGNS    Telemetry:      Daily Height in cm: 172.72 (2020 17:18)    Daily Weight in k.9 (2020 18:42)      Vital Signs Last 24 Hrs  T(C): 36.7 (20 @ 19:45), Max: 36.7 (20 @ 19:45)  T(F): 98.1 (20 @ 19:45), Max: 98.1 (20 @ 19:45)  HR: 126 (20 @ 19:45) (112 - 126)  BP: 145/88 (20 @ 19:45) (135/88 - 160/89)  RR: 18 (20 @ 19:45) (18 - 18)  SpO2: 96% (20 @ 19:45) (96% - 99%)               GLUCOSE  CAPILLARY BLOOD GLUCOSE      POCT Blood Glucose.: 464 mg/dL (2020 19:06)  POCT Blood Glucose.: 470 mg/dL (2020 19:05)  POCT Blood Glucose.: 511 mg/dL (2020 18:22)  POCT Blood Glucose.: 521 mg/dL (2020 17:24)  POCT Blood Glucose.: 496 mg/dL (2020 14:37)      Beta Hydroxy-Butyrate (20 @ 15:40)    Beta Hydroxy-Butyrate: < 0.0 mmol/L

## 2020-06-16 NOTE — ED PROVIDER NOTE - NS ED ROS FT
GENERAL: No fever or chills  EYES: no change in vision  HEENT: no trouble swallowing or speaking  CARDIAC: no chest pain or palpitations  PULMONARY: no cough or SOB  GI: no abdominal pain, nausea, vomiting, diarrhea, or constipation   : polyuria   SKIN: no rashes  NEURO: no headache, numbness, or weakness  MSK: No joint pain

## 2020-06-16 NOTE — H&P ADULT - HISTORY OF PRESENT ILLNESS
History of Present Illness:    70y Male w/ NICM s/p HM2 s/p OHT on 2/23/18 with coronary fistula, HCV+ s/p Rx, prior antibody mediated rejection s/p IVIG plasmapharesis/Rituximab, CKD (baseline Cr 1.4), with recent admission for hemolytic anemia of unclear etiology from 4/29-5/7. Patient was treated w/ prednisone and Rituximab. Tacro was discontinued and patient was also transitioned to everolimus. Endocrinology consulted and patient was admitted to [facility] telemetry.     Patient now presents with hyperglycemia blood sugar 400-500s.     Past Medical History  Knee pain, right  HLD (hyperlipidemia)  Former smoker  DVT of upper extremity (deep vein thrombosis)  Hepatitis C virus  GIB (gastrointestinal bleeding)  H/O prior ablation treatment: for Afib  Ventricular fibrillation: s/p AICD  PAF (paroxysmal atrial fibrillation): on xarelto  Non-Ischemic Cardiomyopathy  SVT (Supraventricular Tachycardia)  HTN  CHF (Congestive Heart Failure)  Hemolytic anemia    Past Surgical History  S/P right heart catheterization: biopsy multiple  H/O heart transplant: 2/2018  LVAD (left ventricular assist device) present  Status post left hip replacement  Hypertension  Hypertension  History of Prior Ablation Treatment: for afib  AICD (Automatic Cardioverter/Defibrillator) Present: [employer] with 1 [employer] lead4/1/09- explanted and replaced with Medtronic 2 leads on 9/2/09

## 2020-06-16 NOTE — H&P ADULT - NSHPPHYSICALEXAM_GEN_ALL_CORE
General: Well nourished, well developed, NAD.    Psychiatric: Normal mood, normal affect observed                              Neuro: Normal exam oriented to person/place & time with no focal motor or sensory  deficits.                    Eyes: Normal exam of conjunctiva & lids, pupils equally reactive.   ENT: Normal exam of nasal/oral mucosa with absence of cyanosis.   Neck: Normal exam of jugular veins, trachea & thyroid.   Chest: Normal lung exam with good air movement absence of crackles, wheezes, rales, or rhonchi                CV:  Auscultation: RRR, normal S1S2  Carotids: No Bruits,  Abdominal Aorta: normal                                                                         GI: Normal exam of abdomen with no noted masses or tenderness. +[id number]                                                Extremities: Normal, no evidence of cyanosis or deformity. No edema  Lower Extremity Pulses: b/l +2  SKIN : Normal exam to inspection & palpation. Intact, no lesions.

## 2020-06-17 DIAGNOSIS — I10 ESSENTIAL (PRIMARY) HYPERTENSION: ICD-10-CM

## 2020-06-17 DIAGNOSIS — Z94.1 HEART TRANSPLANT STATUS: ICD-10-CM

## 2020-06-17 DIAGNOSIS — E78.5 HYPERLIPIDEMIA, UNSPECIFIED: ICD-10-CM

## 2020-06-17 DIAGNOSIS — D59.1 OTHER AUTOIMMUNE HEMOLYTIC ANEMIAS: ICD-10-CM

## 2020-06-17 DIAGNOSIS — Z48.298 ENCOUNTER FOR AFTERCARE FOLLOWING OTHER ORGAN TRANSPLANT: ICD-10-CM

## 2020-06-17 DIAGNOSIS — E11.65 TYPE 2 DIABETES MELLITUS WITH HYPERGLYCEMIA: ICD-10-CM

## 2020-06-17 LAB
ALBUMIN SERPL ELPH-MCNC: 3.8 G/DL — SIGNIFICANT CHANGE UP (ref 3.3–5)
ALP SERPL-CCNC: 84 U/L — SIGNIFICANT CHANGE UP (ref 40–120)
ALT FLD-CCNC: 39 U/L — SIGNIFICANT CHANGE UP (ref 10–45)
ANION GAP SERPL CALC-SCNC: 13 MMOL/L — SIGNIFICANT CHANGE UP (ref 5–17)
APPEARANCE UR: CLEAR — SIGNIFICANT CHANGE UP
AST SERPL-CCNC: 26 U/L — SIGNIFICANT CHANGE UP (ref 10–40)
BILIRUB SERPL-MCNC: 1 MG/DL — SIGNIFICANT CHANGE UP (ref 0.2–1.2)
BILIRUB UR-MCNC: NEGATIVE — SIGNIFICANT CHANGE UP
BLD GP AB SCN SERPL QL: POSITIVE — SIGNIFICANT CHANGE UP
BLD GP AB SCN SERPL QL: POSITIVE — SIGNIFICANT CHANGE UP
BUN SERPL-MCNC: 25 MG/DL — HIGH (ref 7–23)
CALCIUM SERPL-MCNC: 8.6 MG/DL — SIGNIFICANT CHANGE UP (ref 8.4–10.5)
CHLORIDE SERPL-SCNC: 103 MMOL/L — SIGNIFICANT CHANGE UP (ref 96–108)
CO2 SERPL-SCNC: 20 MMOL/L — LOW (ref 22–31)
COLOR SPEC: SIGNIFICANT CHANGE UP
CREAT SERPL-MCNC: 1.05 MG/DL — SIGNIFICANT CHANGE UP (ref 0.5–1.3)
DAT C3-SP REAG RBC QL: NEGATIVE — SIGNIFICANT CHANGE UP
DAT POLY-SP REAG RBC QL: POSITIVE — SIGNIFICANT CHANGE UP
DIFF PNL FLD: ABNORMAL
DIRECT COOMBS IGG: POSITIVE — SIGNIFICANT CHANGE UP
ELUATE ANTIBODY 1: SIGNIFICANT CHANGE UP
GLUCOSE BLDC GLUCOMTR-MCNC: 119 MG/DL — HIGH (ref 70–99)
GLUCOSE BLDC GLUCOMTR-MCNC: 136 MG/DL — HIGH (ref 70–99)
GLUCOSE BLDC GLUCOMTR-MCNC: 165 MG/DL — HIGH (ref 70–99)
GLUCOSE BLDC GLUCOMTR-MCNC: 257 MG/DL — HIGH (ref 70–99)
GLUCOSE BLDC GLUCOMTR-MCNC: 257 MG/DL — HIGH (ref 70–99)
GLUCOSE BLDC GLUCOMTR-MCNC: 295 MG/DL — HIGH (ref 70–99)
GLUCOSE BLDC GLUCOMTR-MCNC: 458 MG/DL — CRITICAL HIGH (ref 70–99)
GLUCOSE BLDC GLUCOMTR-MCNC: 519 MG/DL — CRITICAL HIGH (ref 70–99)
GLUCOSE SERPL-MCNC: 140 MG/DL — HIGH (ref 70–99)
GLUCOSE UR QL: ABNORMAL
HCT VFR BLD CALC: 28.1 % — LOW (ref 39–50)
HGB BLD-MCNC: 9.5 G/DL — LOW (ref 13–17)
KETONES UR-MCNC: NEGATIVE — SIGNIFICANT CHANGE UP
LDH SERPL L TO P-CCNC: 376 U/L — HIGH (ref 50–242)
LDH SERPL L TO P-CCNC: 390 U/L — HIGH (ref 50–242)
LEUKOCYTE ESTERASE UR-ACNC: NEGATIVE — SIGNIFICANT CHANGE UP
MCHC RBC-ENTMCNC: 30.9 PG — SIGNIFICANT CHANGE UP (ref 27–34)
MCHC RBC-ENTMCNC: 33.8 GM/DL — SIGNIFICANT CHANGE UP (ref 32–36)
MCV RBC AUTO: 91.5 FL — SIGNIFICANT CHANGE UP (ref 80–100)
NITRITE UR-MCNC: NEGATIVE — SIGNIFICANT CHANGE UP
NRBC # BLD: 0 /100 WBCS — SIGNIFICANT CHANGE UP (ref 0–0)
PH UR: 7.5 — SIGNIFICANT CHANGE UP (ref 5–8)
PLATELET # BLD AUTO: 122 K/UL — LOW (ref 150–400)
POTASSIUM SERPL-MCNC: 3.4 MMOL/L — LOW (ref 3.5–5.3)
POTASSIUM SERPL-SCNC: 3.4 MMOL/L — LOW (ref 3.5–5.3)
PROT SERPL-MCNC: 6 G/DL — SIGNIFICANT CHANGE UP (ref 6–8.3)
PROT UR-MCNC: ABNORMAL
RBC # BLD: 3.07 M/UL — LOW (ref 4.2–5.8)
RBC # FLD: 15.7 % — HIGH (ref 10.3–14.5)
RH IG SCN BLD-IMP: POSITIVE — SIGNIFICANT CHANGE UP
RH IG SCN BLD-IMP: POSITIVE — SIGNIFICANT CHANGE UP
SODIUM SERPL-SCNC: 136 MMOL/L — SIGNIFICANT CHANGE UP (ref 135–145)
SP GR SPEC: 1.02 — SIGNIFICANT CHANGE UP (ref 1.01–1.02)
UROBILINOGEN FLD QL: NEGATIVE — SIGNIFICANT CHANGE UP
WBC # BLD: 6.73 K/UL — SIGNIFICANT CHANGE UP (ref 3.8–10.5)
WBC # FLD AUTO: 6.73 K/UL — SIGNIFICANT CHANGE UP (ref 3.8–10.5)

## 2020-06-17 PROCEDURE — 86077 PHYS BLOOD BANK SERV XMATCH: CPT

## 2020-06-17 PROCEDURE — 71045 X-RAY EXAM CHEST 1 VIEW: CPT | Mod: 26

## 2020-06-17 PROCEDURE — 93010 ELECTROCARDIOGRAM REPORT: CPT

## 2020-06-17 PROCEDURE — 99232 SBSQ HOSP IP/OBS MODERATE 35: CPT

## 2020-06-17 PROCEDURE — 99223 1ST HOSP IP/OBS HIGH 75: CPT | Mod: GC

## 2020-06-17 PROCEDURE — 99222 1ST HOSP IP/OBS MODERATE 55: CPT

## 2020-06-17 RX ORDER — INSULIN LISPRO 100/ML
5 VIAL (ML) SUBCUTANEOUS
Refills: 0 | Status: DISCONTINUED | OUTPATIENT
Start: 2020-06-17 | End: 2020-06-19

## 2020-06-17 RX ORDER — DEXTROSE 50 % IN WATER 50 %
25 SYRINGE (ML) INTRAVENOUS ONCE
Refills: 0 | Status: DISCONTINUED | OUTPATIENT
Start: 2020-06-17 | End: 2020-06-19

## 2020-06-17 RX ORDER — POTASSIUM CHLORIDE 20 MEQ
40 PACKET (EA) ORAL ONCE
Refills: 0 | Status: COMPLETED | OUTPATIENT
Start: 2020-06-17 | End: 2020-06-17

## 2020-06-17 RX ORDER — INSULIN GLARGINE 100 [IU]/ML
14 INJECTION, SOLUTION SUBCUTANEOUS AT BEDTIME
Refills: 0 | Status: DISCONTINUED | OUTPATIENT
Start: 2020-06-17 | End: 2020-06-17

## 2020-06-17 RX ORDER — INSULIN LISPRO 100/ML
VIAL (ML) SUBCUTANEOUS
Refills: 0 | Status: DISCONTINUED | OUTPATIENT
Start: 2020-06-17 | End: 2020-06-19

## 2020-06-17 RX ORDER — SODIUM CHLORIDE 9 MG/ML
1000 INJECTION, SOLUTION INTRAVENOUS
Refills: 0 | Status: DISCONTINUED | OUTPATIENT
Start: 2020-06-17 | End: 2020-06-19

## 2020-06-17 RX ORDER — DEXTROSE 50 % IN WATER 50 %
12.5 SYRINGE (ML) INTRAVENOUS ONCE
Refills: 0 | Status: DISCONTINUED | OUTPATIENT
Start: 2020-06-17 | End: 2020-06-19

## 2020-06-17 RX ORDER — GLUCAGON INJECTION, SOLUTION 0.5 MG/.1ML
1 INJECTION, SOLUTION SUBCUTANEOUS ONCE
Refills: 0 | Status: DISCONTINUED | OUTPATIENT
Start: 2020-06-17 | End: 2020-06-19

## 2020-06-17 RX ORDER — INSULIN LISPRO 100/ML
4 VIAL (ML) SUBCUTANEOUS
Refills: 0 | Status: DISCONTINUED | OUTPATIENT
Start: 2020-06-17 | End: 2020-06-17

## 2020-06-17 RX ORDER — DEXTROSE 50 % IN WATER 50 %
15 SYRINGE (ML) INTRAVENOUS ONCE
Refills: 0 | Status: DISCONTINUED | OUTPATIENT
Start: 2020-06-17 | End: 2020-06-19

## 2020-06-17 RX ORDER — INSULIN LISPRO 100/ML
VIAL (ML) SUBCUTANEOUS AT BEDTIME
Refills: 0 | Status: DISCONTINUED | OUTPATIENT
Start: 2020-06-17 | End: 2020-06-19

## 2020-06-17 RX ORDER — INSULIN GLARGINE 100 [IU]/ML
18 INJECTION, SOLUTION SUBCUTANEOUS AT BEDTIME
Refills: 0 | Status: DISCONTINUED | OUTPATIENT
Start: 2020-06-17 | End: 2020-06-19

## 2020-06-17 RX ADMIN — EVEROLIMUS 0.75 MILLIGRAM(S): 10 TABLET ORAL at 20:21

## 2020-06-17 RX ADMIN — Medication 3: at 17:07

## 2020-06-17 RX ADMIN — Medication 4 UNIT(S): at 08:29

## 2020-06-17 RX ADMIN — VALGANCICLOVIR 450 MILLIGRAM(S): 450 TABLET, FILM COATED ORAL at 11:39

## 2020-06-17 RX ADMIN — Medication 1: at 08:29

## 2020-06-17 RX ADMIN — INSULIN GLARGINE 18 UNIT(S): 100 INJECTION, SOLUTION SUBCUTANEOUS at 22:27

## 2020-06-17 RX ADMIN — Medication 650 MILLIGRAM(S): at 04:51

## 2020-06-17 RX ADMIN — Medication 650 MILLIGRAM(S): at 08:29

## 2020-06-17 RX ADMIN — Medication 650 MILLIGRAM(S): at 17:07

## 2020-06-17 RX ADMIN — Medication 4 UNIT(S): at 12:41

## 2020-06-17 RX ADMIN — POSACONAZOLE 300 MILLIGRAM(S): 100 TABLET, DELAYED RELEASE ORAL at 11:39

## 2020-06-17 RX ADMIN — Medication 1000 UNIT(S): at 11:43

## 2020-06-17 RX ADMIN — SODIUM CHLORIDE 3 MILLILITER(S): 9 INJECTION INTRAMUSCULAR; INTRAVENOUS; SUBCUTANEOUS at 04:52

## 2020-06-17 RX ADMIN — MAGNESIUM OXIDE 400 MG ORAL TABLET 400 MILLIGRAM(S): 241.3 TABLET ORAL at 11:38

## 2020-06-17 RX ADMIN — Medication 60 MILLIGRAM(S): at 08:04

## 2020-06-17 RX ADMIN — Medication 20 MILLIGRAM(S): at 20:21

## 2020-06-17 RX ADMIN — Medication 1 TABLET(S): at 11:39

## 2020-06-17 RX ADMIN — Medication 2: at 22:28

## 2020-06-17 RX ADMIN — SODIUM CHLORIDE 3 MILLILITER(S): 9 INJECTION INTRAMUSCULAR; INTRAVENOUS; SUBCUTANEOUS at 13:06

## 2020-06-17 RX ADMIN — PANTOPRAZOLE SODIUM 40 MILLIGRAM(S): 20 TABLET, DELAYED RELEASE ORAL at 04:51

## 2020-06-17 RX ADMIN — Medication 40 MILLIEQUIVALENT(S): at 11:43

## 2020-06-17 RX ADMIN — Medication 3: at 04:50

## 2020-06-17 RX ADMIN — Medication 4 UNIT(S): at 17:06

## 2020-06-17 RX ADMIN — Medication 81 MILLIGRAM(S): at 11:39

## 2020-06-17 RX ADMIN — SODIUM CHLORIDE 3 MILLILITER(S): 9 INJECTION INTRAMUSCULAR; INTRAVENOUS; SUBCUTANEOUS at 22:28

## 2020-06-17 RX ADMIN — Medication 1 MILLIGRAM(S): at 11:40

## 2020-06-17 RX ADMIN — EVEROLIMUS 0.75 MILLIGRAM(S): 10 TABLET ORAL at 08:04

## 2020-06-17 NOTE — CONSULT NOTE ADULT - PROBLEM SELECTOR RECOMMENDATION 2
-Appreciate Endo Rec's and teaching  -Appreciate ID recs in ruling out infectious etiology LDH is elevated. Hgb remains stable at 9.5 since last admit  Direct champion remains positive  -Please send Haptoglobin

## 2020-06-17 NOTE — PROGRESS NOTE ADULT - PROBLEM SELECTOR PLAN 2
Heart failure following  continue everolimus 0.75 BID, check level daily   Continue Valcyte 450mg daily for CMV prophylaxis.  Continue Bactrim DS daily   Continue ASA 81mg daily and pravastatin 20 HS  Continue MgOx 400 daily for medication- induced hypomagnesemia  Continue prednisone 60 daily - taper

## 2020-06-17 NOTE — PROGRESS NOTE ADULT - ASSESSMENT
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 Patient was treated w/ prednisone and Rituximab. Tacro was discontinued and patient was also transitioned to everolimus. Patient now presents with hyperglycemia blood sugar 400-500s. Endocrinology consulted and patient was admitted to [facility] telemetry. 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 Patient was treated w/ prednisone and Rituximab. Tacro was discontinued and patient was also transitioned to everolimus. Patient now presents with hyperglycemia blood sugar 400-500s. Endocrinology consulted and patient was admitted to [facility] telemetry.       6/17 VSS, BS trending down to 165 this AM and 140 on BMP. Pending endocrine recs.

## 2020-06-17 NOTE — PROGRESS NOTE ADULT - SUBJECTIVE AND OBJECTIVE BOX
Subjective: Pt states "Hello" denies any CP or SOB. No acute events overnight.     Telemetry:    Vital Signs Last 24 Hrs  T(C): 36.5 (20 @ 04:57), Max: 36.7 (20 @ 19:45)  T(F): 97.7 (20 @ 04:57), Max: 98.1 (20 @ 19:45)  HR: 93 (20 @ 04:57) (93 - 126)  BP: 154/98 (20 @ 04:57) (135/88 - 160/89)  RR: 18 (20 @ 04:57) (18 - 18)  SpO2: 99% (20 @ 04:57) (96% - 99%)              @ 07:01  -   @ 07:00  --------------------------------------------------------  IN: 240 mL / OUT: 200 mL / NET: 40 mL     @ 07:01  -   @ 11:06  --------------------------------------------------------  IN: 240 mL / OUT: 300 mL / NET: -60 mL        Daily Height in cm: 172.72 (2020 17:18)    Daily Weight in k.4 (2020 07:42)                        8.9    4.81  )-----------( 113      ( 2020 15:40 )             25.9         136  |  103  |  25<H>  ----------------------------<  140<H>  3.4<L>   |  20<L>  |  1.05    AST  26  /  ALT  39  /  AlkPhos  84          CAPILLARY BLOOD GLUCOSE  165 - 257          PHYSICAL EXAM  Neurology: A&Ox3, NAD  CV : RRR+S1S2  Sternal Wound: MSI CDI , Stable  Lungs: Respirations non-labored, B/L BS  Abdomen: Soft, NT/ND, +BSx4Q, last BM   (-)N/V/D  : Voiding without difficulty  Extremities: B/L LE edema, negative calf tenderness, +PP , SVG incision             MEDICATIONS  acetaminophen   Tablet .. 650 milliGRAM(s) Oral every 6 hours PRN  aspirin enteric coated 81 milliGRAM(s) Oral daily  cholecalciferol 1000 Unit(s) Oral daily  everolimus (ZORTRESS) 0.75 milliGRAM(s) Oral <User Schedule>  folic acid 1 milliGRAM(s) Oral daily  insulin glargine Injectable (LANTUS) 14 Unit(s) SubCutaneous at bedtime  insulin lispro (HumaLOG) corrective regimen sliding scale   SubCutaneous three times a day before meals  insulin lispro (HumaLOG) corrective regimen sliding scale   SubCutaneous at bedtime  insulin lispro Injectable (HumaLOG) 4 Unit(s) SubCutaneous three times a day with meals  magnesium oxide 400 milliGRAM(s) Oral daily  pantoprazole    Tablet 40 milliGRAM(s) Oral before breakfast  posaconazole DR Tablet 300 milliGRAM(s) Oral daily  potassium chloride    Tablet ER 40 milliEquivalent(s) Oral once  pravastatin 20 milliGRAM(s) Oral at bedtime  predniSONE   Tablet 60 milliGRAM(s) Oral <User Schedule>  sodium bicarbonate 650 milliGRAM(s) Oral two times a day  sodium chloride 0.9% lock flush 3 milliLiter(s) IV Push every 8 hours  trimethoprim   80 mG/sulfamethoxazole 400 mG 1 Tablet(s) Oral daily  valGANciclovir 450 milliGRAM(s) Oral daily        Discussed with Cardiothoracic Team at AM rounds. Subjective: Pt states "Hello" denies any CP or SOB. No acute events overnight.     Telemetry: SR 88  Vital Signs Last 24 Hrs  T(C): 36.5 (20 @ 04:57), Max: 36.7 (20 @ 19:45)  T(F): 97.7 (20 @ 04:57), Max: 98.1 (20 @ 19:45)  HR: 93 (20 @ 04:57) (93 - 126)  BP: 154/98 (20 @ 04:57) (135/88 - 160/89)  RR: 18 (20 @ 04:57) (18 - 18)  SpO2: 99% (20 @ 04:57) (96% - 99%)              @ 07:01  -   @ 07:00  --------------------------------------------------------  IN: 240 mL / OUT: 200 mL / NET: 40 mL      Daily Weight in k.4 (2020 07:42)                          8.9    4.81  )-----------( 113      ( 2020 15:40 )             25.9     136  |  103  |  25<H>  ----------------------------<  140<H>  3.4<L>   |  20<L>  |  1.05    AST  26  /  ALT  39  /  AlkPhos  84          CAPILLARY BLOOD GLUCOSE  165 - 257          PHYSICAL EXAM  Neurology: A&Ox3, NAD  CV : RRR+S1S2  Lungs: Respirations non-labored, B/L BS CTA  Abdomen: Soft, NT/ND, +BSx4Q, last BM  (-)N/V/D  : Voiding without difficulty  Extremities: B/L LE no edema, negative calf tenderness, +PP            MEDICATIONS  acetaminophen   Tablet .. 650 milliGRAM(s) Oral every 6 hours PRN  aspirin enteric coated 81 milliGRAM(s) Oral daily  cholecalciferol 1000 Unit(s) Oral daily  everolimus (ZORTRESS) 0.75 milliGRAM(s) Oral <User Schedule>  folic acid 1 milliGRAM(s) Oral daily  insulin glargine Injectable (LANTUS) 14 Unit(s) SubCutaneous at bedtime  insulin lispro (HumaLOG) corrective regimen sliding scale   SubCutaneous three times a day before meals  insulin lispro (HumaLOG) corrective regimen sliding scale   SubCutaneous at bedtime  insulin lispro Injectable (HumaLOG) 4 Unit(s) SubCutaneous three times a day with meals  magnesium oxide 400 milliGRAM(s) Oral daily  pantoprazole    Tablet 40 milliGRAM(s) Oral before breakfast  posaconazole DR Tablet 300 milliGRAM(s) Oral daily  potassium chloride    Tablet ER 40 milliEquivalent(s) Oral once  pravastatin 20 milliGRAM(s) Oral at bedtime  predniSONE   Tablet 60 milliGRAM(s) Oral <User Schedule>  sodium bicarbonate 650 milliGRAM(s) Oral two times a day  sodium chloride 0.9% lock flush 3 milliLiter(s) IV Push every 8 hours  trimethoprim   80 mG/sulfamethoxazole 400 mG 1 Tablet(s) Oral daily  valGANciclovir 450 milliGRAM(s) Oral daily        Discussed with Cardiothoracic Team at AM rounds.

## 2020-06-17 NOTE — CONSULT NOTE ADULT - ATTENDING COMMENTS
Agree with assessment and plan as above by Dr. Branodn. Reviewed all pertinent labs, glucose values, and imaging studies. Modifications made as indicated above. Now on higher dose of prednisone compared to previous admission. Increase Lantus to 18 units qhs and Humalog to 5 units with correction. Will adjust further as needed.    Hi Scruggs D.O  [phone]    Hi Scruggs D.O  [phone] Agree with assessment and plan as above by Dr. Brandon. Reviewed all pertinent labs, glucose values, and imaging studies. Modifications made as indicated above. Now on higher dose of prednisone compared to previous admission. Increase Lantus to 18 units qhs and Humalog to 5 units with correction. Will adjust further as needed.    Hi Scruggs D.O  [phone]

## 2020-06-17 NOTE — CONSULT NOTE ADULT - ASSESSMENT
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 Patient was treated w/ prednisone and Rituximab. Tacro was discontinued and patient was also transitioned to everolimus.   Patient now presents with hyperglycemia blood sugar 400-500s and concern that he may have taken a whole syringe (though it appears he likely did not) He shows no signs of hyperglycemia (other than polyuria) nor hypoglycemia.  His LDH is elevated indicating possible ongoing AIHA, though hgb is stable. ID involved to help r/o infectious etiology of hyperglycemia.

## 2020-06-17 NOTE — CONSULT NOTE ADULT - PROBLEM SELECTOR RECOMMENDATION 3
LDH is elevated. Hgb remains stable at 9.5 since last admit  Direct champion remains positive  -Please send Haptoglobin -Continue Everolimus 0.75. Will follow-up level and adjust as needed.   -COntinue prednisone 60 mg dialy  -Valcyte and Bactrim Ppx

## 2020-06-17 NOTE — PROGRESS NOTE ADULT - PROBLEM SELECTOR PLAN 1
House endocrinology consulted - F/U recs  Continue glycemic control on lantus 14 units HS and Humalog 4 TID premeals  Continue Humalog ISS  Check FS AC/HS  Continue diabetic diet

## 2020-06-17 NOTE — CONSULT NOTE ADULT - ATTENDING COMMENTS
Improved glycemic control. Unclear etiology of development of hyperglycemia. Will adjust everolimus as needed.     Please call me with questions at [phone]. Plan discussed in multidisciplinary rounds with 2 [place] team and CT surgery.

## 2020-06-17 NOTE — CONSULT NOTE ADULT - PROBLEM SELECTOR RECOMMENDATION 9
-Continue Everolimus 0.75  -COntinue prednisone 60 mg dialy  -Valcyte and Bactrim Ppx -Appreciate Endo Rec's and teaching  -Appreciate ID recs in ruling out infectious etiology

## 2020-06-17 NOTE — CONSULT NOTE ADULT - SUBJECTIVE AND OBJECTIVE BOX
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 Patient was treated w/ prednisone and Rituximab. Tacro was discontinued and patient was also transitioned to everolimus. Endocrinology consulted and patient was admitted to [facility] telemetry.     Patient now presents with hyperglycemia blood sugar 400-500s.     Past Medical History  Knee pain, right  HLD (hyperlipidemia)  Former smoker  DVT of upper extremity (deep vein thrombosis)  Hepatitis C virus  GIB (gastrointestinal bleeding)  H/O prior ablation treatment: for Afib  Ventricular fibrillation: s/p AICD  PAF (paroxysmal atrial fibrillation): on xarelto  Non-Ischemic Cardiomyopathy  SVT (Supraventricular Tachycardia)  HTN  CHF (Congestive Heart Failure)  Hemolytic anemia    Past Surgical History  S/P right heart catheterization: biopsy multiple  H/O heart transplant: 2/2018  LVAD (left ventricular assist device) present  Status post left hip replacement  Hypertension  Hypertension  History of Prior Ablation Treatment: for afib  AICD (Automatic Cardioverter/Defibrillator) Present: [employer] with 1 [employer] lead4/1/09- explanted and replaced with Medtronic 2 leads on 9/2/09 (16 Jun 2020 19:02)      PAST MEDICAL & SURGICAL HISTORY:  H/O hemolytic anemia  H/O autoimmune hemolytic anemia  Knee pain, right  HLD (hyperlipidemia)  Former smoker  DVT of upper extremity (deep vein thrombosis)  Hepatitis C virus  GIB (gastrointestinal bleeding)  Ventricular fibrillation: s/p AICD  PAF (paroxysmal atrial fibrillation): on xarelto  Non-Ischemic Cardiomyopathy  SVT (Supraventricular Tachycardia)  HTN  CHF (Congestive Heart Failure)  S/P right heart catheterization: biopsy multiple  H/O heart transplant: 2/2018  Status post left hip replacement  History of Prior Ablation Treatment: for afib  AICD (Automatic Cardioverter/Defibrillator) Present: [employer] with 1 [employer] lead4/1/09- explanted and replaced with Medtronic 2 leads on 9/2/09      SOCIAL:    FAMILY HISTORY:  No pertinent family history in first degree relatives    REVIEW OF SYSTEMS  General:	Denies any malaise fatigue or chills. Fevers absent    Skin:No rash  	  Ophthalmologic:Denies any visual complaints,discharge redness or photophobia  	  ENMT:No nasal discharge,headache,sinus congestion or throat pain.No dental complaints    Respiratory and Thorax:No cough,sputum or chest pain.Denies shortness of breath  	  Cardiovascular:	No chest pain,palpitaions or dizziness    Gastrointestinal:	NO nausea,abdominal pain or diarrhea.    Genitourinary:	No dysuria,frequency. No flank pain    Musculoskeletal:	No joint swelling or pain.No weakness    Neurological:No confusion,diziness.No extremity weakness.No bladder or bowel incontinence	    Psychiatric:No delusions or hallucinations	    Hematology/Lymphatics:	No LN swelling.No gum bleeding     Endocrine:	No recent weight gain or loss.No abnormal heat/cold intolerance    Allergic/Immunologic:	No hives or rash   Allergies    No Known Allergies    Intolerances        ANTIMICROBIALS:    posaconazole DR Tablet 300 milliGRAM(s) Oral daily  trimethoprim   80 mG/sulfamethoxazole 400 mG 1 Tablet(s) Oral daily  valGANciclovir 450 milliGRAM(s) Oral daily      Vital Signs Last 24 Hrs  T(C): 36.6 (17 Jun 2020 12:27), Max: 36.7 (16 Jun 2020 19:45)  T(F): 97.8 (17 Jun 2020 12:27), Max: 98.1 (16 Jun 2020 19:45)  HR: 112 (17 Jun 2020 12:27) (93 - 126)  BP: 143/90 (17 Jun 2020 12:27) (143/90 - 156/99)  BP(mean): --  RR: 18 (17 Jun 2020 12:27) (18 - 18)  SpO2: 99% (17 Jun 2020 12:27) (96% - 99%)    PHYSICAL EXAM:Pleasant patient in no acute distress.      Constitutional:Comfortable.Awake and alert  No cachexia     Eyes:PERRL EOMI.NO discharge or conjunctival injection    ENMT:No sinus tenderness.No thrush.No pharyngeal exudate or erythema. no teeth    Neck:Supple,No LN,no JVD      Respiratory:Good air entry bilaterally,CTA    Cardiovascular:S1 S2 wnl, No murmurs,rub or gallops  sternotomy well healed    Gastrointestinal:Soft BS(+) no tenderness no masses ,No rebound or guarding    Genitourinary:No CVA tendereness     Rectal:    Extremities:No cyanosis,clubbing or edema. arthritis  THR site no erythema or tenderness    Vascular:peripheral pulses felt    Neurological:AAO X 3,No grossly focal deficits    Skin:No rash     Lymph Nodes:No palpable LNs    Musculoskeletal:No joint swelling or LOM    Psychiatric:Affect normal.                              9.5    6.73  )-----------( 122      ( 17 Jun 2020 14:13 )             28.1       06-17    136  |  103  |  25<H>  ----------------------------<  140<H>  3.4<L>   |  20<L>  |  1.05    Ca    8.6      17 Jun 2020 08:50  Phos  3.0     06-16  Mg     2.1     06-16    TPro  6.0  /  Alb  3.8  /  TBili  1.0  /  DBili  x   /  AST  26  /  ALT  39  /  AlkPhos  84  06-17      RECENT CULTURES:  Urine Microscopic-Add On (NC) (06.17.20 @ 09:07)    Hyaline Casts: 0 /lpf    Red Blood Cell - Urine: 1 /hpf    White Blood Cell - Urine: 1 /HPF    Epithelial Cells: 0 /hpf    Bacteria: Negative    COVID-19 PCR: NotDetec (06.16.20 @ 16:33)        RADIOLOGY:  < from: Xray Chest 1 View AP/PA (06.17.20 @ 05:22) >  Impression:    The heart is normal in size. Elevated right hemidiaphragm which appears to be unchanged when compared to previous study done on June 16, 2020. The lungs appear to be clear. Status post sternotomy.. No pneumothorax.    < end of copied text >      IMPRESSION:    < from: CT Chest No Cont (05.22.20 @ 20:29) >  IMPRESSION:     1.  No change in the right upper lobe linear opacity likely due to scarring or the additional bilateral linear opacities likely due to atelectasis or scarring.  2.  Emphysema.  3.  No enlarged chest lymph nodes.    < end of copied text >  Rx:

## 2020-06-17 NOTE — CONSULT NOTE ADULT - SUBJECTIVE AND OBJECTIVE BOX
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 Patient was treated w/ prednisone and Rituximab. Tacro was discontinued and patient was also transitioned to everolimus. Endocrinology consulted and patient was admitted to [facility] telemetry.     Patient now presents with hyperglycemia blood sugar 400-500s.     Of note, recently admitted for...    Past Medical History  Knee pain, right  HLD (hyperlipidemia)  Former smoker  DVT of upper extremity (deep vein thrombosis)  Hepatitis C virus  GIB (gastrointestinal bleeding)  H/O prior ablation treatment: for Afib  Ventricular fibrillation: s/p AICD  PAF (paroxysmal atrial fibrillation): on xarelto  Non-Ischemic Cardiomyopathy  SVT (Supraventricular Tachycardia)  HTN  CHF (Congestive Heart Failure)  Hemolytic anemia    Past Surgical History  S/P right heart catheterization: biopsy multiple  H/O heart transplant: 2018  LVAD (left ventricular assist device) present  Status post left hip replacement  Hypertension  Hypertension  History of Prior Ablation Treatment: for afib  AICD (Automatic Cardioverter/Defibrillator) Present: [employer] with 1 [employer] lead09- explanted and replaced with Medtronic 2 leads on 09 (2020 19:02)      PMHx:   H/O hemolytic anemia  H/O autoimmune hemolytic anemia  Knee pain, right  HLD (hyperlipidemia)  Former smoker  DVT of upper extremity (deep vein thrombosis)  Hepatitis C virus  GIB (gastrointestinal bleeding)  H/O prior ablation treatment  Ventricular fibrillation  PAF (paroxysmal atrial fibrillation)  Non-Ischemic Cardiomyopathy  SVT (Supraventricular Tachycardia)  HTN  CHF (Congestive Heart Failure)      PSHx:   S/P right heart catheterization  H/O heart transplant  LVAD (left ventricular assist device) present  Status post left hip replacement  Hypertension  Hypertension  History of Prior Ablation Treatment  AICD (Automatic Cardioverter/Defibrillator) Present      Allergies:  No Known Allergies      Home Meds:    Current Medications:   acetaminophen   Tablet .. 650 milliGRAM(s) Oral every 6 hours PRN  aspirin enteric coated 81 milliGRAM(s) Oral daily  cholecalciferol 1000 Unit(s) Oral daily  dextrose 40% Gel 15 Gram(s) Oral once PRN  dextrose 5%. 1000 milliLiter(s) IV Continuous <Continuous>  dextrose 50% Injectable 12.5 Gram(s) IV Push once  dextrose 50% Injectable 25 Gram(s) IV Push once  dextrose 50% Injectable 25 Gram(s) IV Push once  everolimus (ZORTRESS) 0.75 milliGRAM(s) Oral <User Schedule>  folic acid 1 milliGRAM(s) Oral daily  glucagon  Injectable 1 milliGRAM(s) IntraMuscular once PRN  insulin glargine Injectable (LANTUS) 14 Unit(s) SubCutaneous at bedtime  insulin lispro (HumaLOG) corrective regimen sliding scale   SubCutaneous three times a day before meals  insulin lispro (HumaLOG) corrective regimen sliding scale   SubCutaneous at bedtime  insulin lispro Injectable (HumaLOG) 4 Unit(s) SubCutaneous three times a day with meals  magnesium oxide 400 milliGRAM(s) Oral daily  pantoprazole    Tablet 40 milliGRAM(s) Oral before breakfast  posaconazole DR Tablet 300 milliGRAM(s) Oral daily  pravastatin 20 milliGRAM(s) Oral at bedtime  predniSONE   Tablet 60 milliGRAM(s) Oral <User Schedule>  sodium bicarbonate 650 milliGRAM(s) Oral two times a day  sodium chloride 0.9% lock flush 3 milliLiter(s) IV Push every 8 hours  trimethoprim   80 mG/sulfamethoxazole 400 mG 1 Tablet(s) Oral daily  valGANciclovir 450 milliGRAM(s) Oral daily    FAMILY HISTORY:  No pertinent family history in first degree relatives    Social History:  Smoking History: Former smoker  Alcohol Use: Denied  Drug Use: Denied    REVIEW OF SYSTEMS:  CONSTITUTIONAL: No weakness, fevers or chills  EYES/ENT: No visual changes;  No dysphagia  NECK: No pain or stiffness  RESPIRATORY: No cough, wheezing, hemoptysis; No shortness of breath  CARDIOVASCULAR: No chest pain or palpitations; No lower extremity edema  GASTROINTESTINAL: No abdominal or epigastric pain. No nausea, vomiting, or hematemesis; No diarrhea or constipation. No melena or hematochezia.  BACK: No back pain  GENITOURINARY: No dysuria, frequency or hematuria  NEUROLOGICAL: No numbness or weakness  SKIN: No itching, burning, rashes, or lesions   All other review of systems is negative unless indicated above.    Physical Exam:  T(F): 97.7 (-17), Max: 98.1 (-16)  HR: 93 (-17) (93 - 126)  BP: 154/98 (-) (135/88 - 160/89)  RR: 18 (-)  SpO2: 99% (-)  GENERAL: No acute distress, well-developed  HEAD:  Atraumatic, Normocephalic  ENT: EOMI, PERRLA, conjunctiva and sclera clear, Neck supple, No JVD, moist mucosa  CHEST/LUNG: Clear to auscultation bilaterally; No wheeze, equal breath sounds bilaterally   BACK: No spinal tenderness  HEART: Regular rate and rhythm; Continuous murmur with systolic and diastolic increases in intensity (systolic > diastolic), louder with expiration.  ABDOMEN: Soft, Nontender, Nondistended; Bowel sounds present  EXTREMITIES:  No clubbing, cyanosis, or edema  PSYCH: Nl behavior, nl affect  NEUROLOGY: AAOx3, non-focal, cranial nerves intact  SKIN: Normal color, No rashes or lesions  LINES:    Cardiovascular Diagnostic Testing:    Echo: Personally reviewed:  Dimensions:    Normal Values:  LA:     3.7    2.0 - 4.0 cm  Ao:     3.7    2.0 - 3.8 cm  SEPTUM: 0.9    0.6 - 1.2 cm  PWT:    0.9    0.6 - 1.1 cm  LVIDd:  4.5    3.0 - 5.6 cm  LVIDs:  2.8    1.8 - 4.0 cm  Derived variables:  LVMI: 71 g/m2  RWT: 0.40  Fractional short: 38 %  Doppler Peak Velocity (m/sec): AoV=1.0  ------------------------------------------------------------------------  Observations:  Mitral Valve: Normal mitral valve. Minimal mitral  regurgitation.  Aortic Valve/Aorta: Normal trileaflet aortic valve. Peak  transaortic valve gradient equals 4 mm Hg. Peak left  ventricular outflow tract gradient equals 2 mm Hg, mean  gradient is equal to 1 mm Hg, LVOT velocity time integral  equals 13 cm.  Aortic Root: 3.7 cm.  Left Atrium: LA volume index = 40 cc/m2.  Left Ventricle: Normal left ventricular systolic function.  Septal motion consistent with cardiac surgery. Normal left  ventricular internal dimensions and wall thicknesses.  Right Heart: Normal right atrium. Normal right ventricular  size and function. Normal tricuspid valve. Minimal  tricuspid regurgitation. Normal pulmonic valve.  Pericardium/Pleura: Normal pericardium with no pericardial  effusion.  Hemodynamic: Estimated right atrial pressure is 8 mm Hg.  Estimated right ventricular systolic pressure equals 32 mm  Hg, assuming right atrial pressure equals 8 mm Hg,  consistent with normal pulmonary pressures.  ------------------------------------------------------------------------  Conclusions:  1. Normal left ventricular internal dimensions and wall  thicknesses.  2. Normal left ventricular systolic function. Septal motion  consistent with cardiac surgery.  3. Normal right ventricular size and function.  ------------------------------------------------------------------------  Confirmed on  2020 - 15:20:40 by Patric Arroyo M.D.    Cath:  Signed 2020 14:33:16  HEMODYNAMIC TABLES  Pressures:  Baseline  Pressures:  - HR: 102  Pressures:  - Rhythm:  Pressures:  -- Pulmonary Artery (S/D/M): 19  Pressures:  -- Pulmonary Capillary Wedge:   Pressures:  -- Right Atrium (a/v/M):   Pressures:  -- Right Ventricle (s/edp): 27/3/--  O2 Sats:  Baseline  O2 Sats:  - HR: 102  O2 Sats:  - Rhythm:  O2 Sats:  -- AO: 10/98/13.33  O2 Sats:  -- PA: 9.1/67.6/8.37  Outputs:  Baseline  Outputs:  -- CALCULATIONS[age]  Outputs:  -- CALCULATIONS: Body Surface Area: 1.89  Outputs:  -- CALCULATIONS: Height in cm: 173.00  Outputs:  -- CALCULATIONS: Sex: Male  Outputs:  -- CALCULATIONS: Weight in k.80  Outputs:  -- OUTPUTS: Blood Oxygen Difference: 4.96  Outputs:  -- OUTPUTS: CO by Marino: 5.10  Outputs:  -- OUTPUTS: Marino cardiac index: 2.70  Outputs:  -- OUTPUTS: O2 consumption: 253.00  Outputs:  -- OUTPUTS: Vo2 Indexed: 134.20  Outputs:  -- RESISTANCES: Pulmonary vascularindex (dsc): 413.99  Outputs:  -- RESISTANCES: Pulmonary vascular index (Wood Units): 5.18  Outputs:  -- RESISTANCES: Pulmonary vascular resistance (dsc): 219.60  Outputs:  -- RESISTANCES: Pulmonary vascular resistance (Wood Units): 2.75  Outputs:  -- RESISTANCES: Right ventricular stroke work: 9.52  Outputs:  -- RESISTANCES: Right ventricular stroke work index: 5.05  Outputs:  -- RESISTANCES: Total pulmonary index (dsc): 561.84  Outputs:  -- RESISTANCES: Total pulmonary index (Wood Units): 7.02  Outputs:  -- RESISTANCES: Total pulmonary resistance (dsc): 298.03  Outputs:  -- RESISTANCES: Total pulmonary resistance (Wood Units): 3.73  Outputs:  -- SHUNTS: Qs Indexed: 2.70  Outputs:  -- SHUNTS: Systemic flow: 5.10    Imaging:    CXR: Personally reviewed    Labs: Personally reviewed                        8.9    4.81  )-----------( 113      ( 2020 15:40 )             25.9     06-17    136  |  103  |  25<H>  ----------------------------<  140<H>  3.4<L>   |  20<L>  |  1.05    Ca    8.6      2020 08:50  Phos  3.0     06-16  Mg     2.1     06-16    TPro  6.0  /  Alb  3.8  /  TBili  1.0  /  DBili  x   /  AST  26  /  ALT  39  /  AlkPhos  84  06-17    Direct cornel Poly Positive  Direct cornel IgG Positive  Direct cornel C3 negative Patient seen and evaluated at bedside    Chief Complaint:    HPI:  History of Present Illness:    70y Male w/ NICM s/p HM2 s/p OHT on 18 with coronary fistula, HCV+ s/p Rx, prior antibody mediated rejection s/p IVIG plasmapharesis/Rituximab, CKD (baseline Cr 1.4), with recent admission for hemolytic anemia of unclear etiology from - and again -. Patient was treated w/ prednisone and Rituximab. Tacro was discontinued and patient was also transitioned to everolimus.   Patient now presents with hyperglycemia blood sugar 400-500s. He called the cardiac clinic noting his sugars were in the 500s and while using his pen he may have injected a "whole syringe". He describes the correct use, however, and 'turning the dial to 7 units and clicking the button'. He denies any other symptoms, such as dizziness, lightheadedness, syncope, CP, SOB, fevers, chills, nausea, vomiting or diarrhea. States he only came to the ED to help manage his sugar. He feels his diet/snacking may have played he role.    Past Medical History  Knee pain, right  HLD (hyperlipidemia)  Former smoker  DVT of upper extremity (deep vein thrombosis)  Hepatitis C virus  GIB (gastrointestinal bleeding)  H/O prior ablation treatment: for Afib  Ventricular fibrillation: s/p AICD  PAF (paroxysmal atrial fibrillation): on xarelto  Non-Ischemic Cardiomyopathy  SVT (Supraventricular Tachycardia)  HTN  CHF (Congestive Heart Failure)  Hemolytic anemia    Past Surgical History  S/P right heart catheterization: biopsy multiple  H/O heart transplant: 2018  LVAD (left ventricular assist device) present  Status post left hip replacement  Hypertension  Hypertension  History of Prior Ablation Treatment: for afib  AICD (Automatic Cardioverter/Defibrillator) Present: [employer] with 1 [employer] lead09- explanted and replaced with Medtronic 2 leads on 09 (2020 19:02)      PMHx:   H/O hemolytic anemia  H/O autoimmune hemolytic anemia  Knee pain, right  HLD (hyperlipidemia)  Former smoker  DVT of upper extremity (deep vein thrombosis)  Hepatitis C virus  GIB (gastrointestinal bleeding)  H/O prior ablation treatment  Ventricular fibrillation  PAF (paroxysmal atrial fibrillation)  Non-Ischemic Cardiomyopathy  SVT (Supraventricular Tachycardia)  HTN  CHF (Congestive Heart Failure)      PSHx:   S/P right heart catheterization  H/O heart transplant  LVAD (left ventricular assist device) present  Status post left hip replacement  Hypertension  Hypertension  History of Prior Ablation Treatment  AICD (Automatic Cardioverter/Defibrillator) Present    Allergies:  No Known Allergies    Home Meds:  Home Medications:  acetaminophen 325 mg oral tablet: 2 tab(s) orally every 6 hours, As needed, Mild Pain (1 - 3) (2020 14:25)  aspirin 81 mg oral delayed release tablet: 1 tab(s) orally once a day (:25)  bacitracin 500 units/g topical ointment: 1 application topically 2 times a day (:25)  cholecalciferol oral tablet: 1000 unit(s) orally once a day (:)  Colace 50 mg oral capsule: 1 cap(s) orally 2 times a day, As Needed (22 May 2020 23:32)  everolimus 0.75 mg oral tablet: 1 tab(s) orally 2 times a day  8AM &amp; 8PM (2020 14:25)  folic acid 1 mg oral tablet: 1 tab(s) orally once a day @ 8AM (2020 14:25)  magnesium oxide 400 mg (241.3 mg elemental magnesium) oral tablet: 1 tab(s) orally  (2020 14:25)  pantoprazole 40 mg oral delayed release tablet: 1 tab(s) orally once a day (before a meal) (:25)  posaconazole 100 mg oral delayed release tablet: 3 tab(s) orally once a day  @ 8am (2020 14:25)  pravastatin 20 mg oral tablet: 1 tab(s) orally once a day (at bedtime) (2020 14:25)  predniSONE 20 mg oral tablet: 3 tab(s) orally  (2020 14:25)  sodium bicarbonate 650 mg oral tablet: 1 tab(s) orally 2 times a day  8AM &amp; 8PM (2020 14:25)  sodium zirconium cyclosilicate 10 g oral powder for reconstitution: 1 tab(s) orally once a day @ 4pm (2020 14:25)  sulfamethoxazole-trimethoprim 400 mg-80 mg oral tablet: 1 tab(s) orally once a day (2020 14:25)  tacrolimus 1 mg oral capsule: 1 cap(s) orally 2 times a day    HOLD TACROLMINUS  NIGHT DOSE &amp; 6/3 AM DOSE   YOUR NEXT DOSE IS 6/3 WEDNESDAY @ 8PM (2020 14:25)  valGANciclovir 450 mg oral tablet: 1 tab(s) orally  (2020 14:25)      Current Medications:   acetaminophen   Tablet .. 650 milliGRAM(s) Oral every 6 hours PRN  aspirin enteric coated 81 milliGRAM(s) Oral daily  cholecalciferol 1000 Unit(s) Oral daily  dextrose 40% Gel 15 Gram(s) Oral once PRN  dextrose 5%. 1000 milliLiter(s) IV Continuous <Continuous>  dextrose 50% Injectable 12.5 Gram(s) IV Push once  dextrose 50% Injectable 25 Gram(s) IV Push once  dextrose 50% Injectable 25 Gram(s) IV Push once  everolimus (ZORTRESS) 0.75 milliGRAM(s) Oral <User Schedule>  folic acid 1 milliGRAM(s) Oral daily  glucagon  Injectable 1 milliGRAM(s) IntraMuscular once PRN  insulin glargine Injectable (LANTUS) 14 Unit(s) SubCutaneous at bedtime  insulin lispro (HumaLOG) corrective regimen sliding scale   SubCutaneous three times a day before meals  insulin lispro (HumaLOG) corrective regimen sliding scale   SubCutaneous at bedtime  insulin lispro Injectable (HumaLOG) 4 Unit(s) SubCutaneous three times a day with meals  magnesium oxide 400 milliGRAM(s) Oral daily  pantoprazole    Tablet 40 milliGRAM(s) Oral before breakfast  posaconazole DR Tablet 300 milliGRAM(s) Oral daily  pravastatin 20 milliGRAM(s) Oral at bedtime  predniSONE   Tablet 60 milliGRAM(s) Oral <User Schedule>  sodium bicarbonate 650 milliGRAM(s) Oral two times a day  sodium chloride 0.9% lock flush 3 milliLiter(s) IV Push every 8 hours  trimethoprim   80 mG/sulfamethoxazole 400 mG 1 Tablet(s) Oral daily  valGANciclovir 450 milliGRAM(s) Oral daily    FAMILY HISTORY:  No pertinent family history in first degree relatives    Social History:  Smoking History: Former smoker  Alcohol Use: Denied  Drug Use: Denied    REVIEW OF SYSTEMS:  CONSTITUTIONAL: No weakness, fevers or chills  EYES/ENT: No visual changes;  No dysphagia  NECK: No pain or stiffness  RESPIRATORY: No cough, wheezing, hemoptysis; No shortness of breath  CARDIOVASCULAR: No chest pain or palpitations; No lower extremity edema  GASTROINTESTINAL: No abdominal or epigastric pain. No nausea, vomiting, or hematemesis; No diarrhea or constipation. No melena or hematochezia.  BACK: No back pain  GENITOURINARY: No dysuria, frequency or hematuria  NEUROLOGICAL: No numbness or weakness  SKIN: No itching, burning, rashes, or lesions   All other review of systems is negative unless indicated above.    Physical Exam:  T(F): 97.7 (-), Max: 98.1 (-16)  HR: 93 (-) (93 - 126)  BP: 154/98 (-) (135/88 - 160/89)  RR: 18 (-)  SpO2: 99% ()  GENERAL: No acute distress, well-developed  HEAD:  Atraumatic, Normocephalic  ENT: EOMI, PERRLA, conjunctiva and sclera clear, Neck supple, No JVD, moist mucosa  CHEST/LUNG: Clear to auscultation bilaterally; No wheeze, equal breath sounds bilaterally   BACK: No spinal tenderness  HEART: Regular rate and rhythm; Continuous murmur with systolic and diastolic increases in intensity (systolic > diastolic), louder with inspiration.  ABDOMEN: Soft, Nontender, Nondistended; Bowel sounds present  EXTREMITIES:  No clubbing, cyanosis, or edema  PSYCH: Nl behavior, nl affect  NEUROLOGY: AAOx3, non-focal, cranial nerves intact  SKIN: Normal color, No rashes or lesions  LINES:    Cardiovascular Diagnostic Testing:    Echo: Personally reviewed:  Dimensions:    Normal Values:  LA:     3.7    2.0 - 4.0 cm  Ao:     3.7    2.0 - 3.8 cm  SEPTUM: 0.9    0.6 - 1.2 cm  PWT:    0.9    0.6 - 1.1 cm  LVIDd:  4.5    3.0 - 5.6 cm  LVIDs:  2.8    1.8 - 4.0 cm  Derived variables:  LVMI: 71 g/m2  RWT: 0.40  Fractional short: 38 %  Doppler Peak Velocity (m/sec): AoV=1.0  ------------------------------------------------------------------------  Observations:  Mitral Valve: Normal mitral valve. Minimal mitral  regurgitation.  Aortic Valve/Aorta: Normal trileaflet aortic valve. Peak  transaortic valve gradient equals 4 mm Hg. Peak left  ventricular outflow tract gradient equals 2 mm Hg, mean  gradient is equal to 1 mm Hg, LVOT velocity time integral  equals 13 cm.  Aortic Root: 3.7 cm.  Left Atrium: LA volume index = 40 cc/m2.  Left Ventricle: Normal left ventricular systolic function.  Septal motion consistent with cardiac surgery. Normal left  ventricular internal dimensions and wall thicknesses.  Right Heart: Normal right atrium. Normal right ventricular  size and function. Normal tricuspid valve. Minimal  tricuspid regurgitation. Normal pulmonic valve.  Pericardium/Pleura: Normal pericardium with no pericardial  effusion.  Hemodynamic: Estimated right atrial pressure is 8 mm Hg.  Estimated right ventricular systolic pressure equals 32 mm  Hg, assuming right atrial pressure equals 8 mm Hg,  consistent with normal pulmonary pressures.  ------------------------------------------------------------------------  Conclusions:  1. Normal left ventricular internal dimensions and wall  thicknesses.  2. Normal left ventricular systolic function. Septal motion  consistent with cardiac surgery.  3. Normal right ventricular size and function.  ------------------------------------------------------------------------  Confirmed on  2020 - 15:20:40 by Patric Arroyo M.D.    Cath:  Signed 2020 14:33:16  HEMODYNAMIC TABLES  Pressures:  Baseline  Pressures:  - HR: 102  Pressures:  - Rhythm:  Pressures:  -- Pulmonary Artery (S/D/M): 19  Pressures:  -- Pulmonary Capillary Wedge:   Pressures:  -- Right Atrium (a/v/M):   Pressures:  -- Right Ventricle (s/edp): 27/3/--  O2 Sats:  Baseline  O2 Sats:  - HR: 102  O2 Sats:  - Rhythm:  O2 Sats:  -- AO: 10/98/13.33  O2 Sats:  -- PA: 9.1/67.6/8.37  Outputs:  Baseline  Outputs:  -- CALCULATIONS[age]  Outputs:  -- CALCULATIONS: Body Surface Area: 1.89  Outputs:  -- CALCULATIONS: Height in cm: 173.00  Outputs:  -- CALCULATIONS: Sex: Male  Outputs:  -- CALCULATIONS: Weight in k.80  Outputs:  -- OUTPUTS: Blood Oxygen Difference: 4.96  Outputs:  -- OUTPUTS: CO by Marino: 5.10  Outputs:  -- OUTPUTS: Marino cardiac index: 2.70  Outputs:  -- OUTPUTS: O2 consumption: 253.00  Outputs:  -- OUTPUTS: Vo2 Indexed: 134.20  Outputs:  -- RESISTANCES: Pulmonary vascularindex (dsc): 413.99  Outputs:  -- RESISTANCES: Pulmonary vascular index (Wood Units): 5.18  Outputs:  -- RESISTANCES: Pulmonary vascular resistance (dsc): 219.60  Outputs:  -- RESISTANCES: Pulmonary vascular resistance (Wood Units): 2.75  Outputs:  -- RESISTANCES: Right ventricular stroke work: 9.52  Outputs:  -- RESISTANCES: Right ventricular stroke work index: 5.05  Outputs:  -- RESISTANCES: Total pulmonary index (dsc): 561.84  Outputs:  -- RESISTANCES: Total pulmonary index (Wood Units): 7.02  Outputs:  -- RESISTANCES: Total pulmonary resistance (dsc): 298.03  Outputs:  -- RESISTANCES: Total pulmonary resistance (Wood Units): 3.73  Outputs:  -- SHUNTS: Qs Indexed: 2.70  Outputs:  -- SHUNTS: Systemic flow: 5.10    Imaging:    CXR: Personally reviewed    Labs: Personally reviewed                        8.9    4.81  )-----------( 113      ( 2020 15:40 )             25.9     06-17    136  |  103  |  25<H>  ----------------------------<  140<H>  3.4<L>   |  20<L>  |  1.05    Ca    8.6      2020 08:50  Phos  3.0     06-16  Mg     2.1     06-16    TPro  6.0  /  Alb  3.8  /  TBili  1.0  /  DBili  x   /  AST  26  /  ALT  39  /  AlkPhos  84  06-17    Direct cornel Poly Positive  Direct cornel IgG Positive  Direct cornel C3 negative

## 2020-06-17 NOTE — CONSULT NOTE ADULT - ASSESSMENT
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 Patient was treated w/ prednisone and Rituximab. Tacrolimus was discontinued and patient was also transitioned to Everolimus. Endocrinology consult requested for management of hyperglycemia in patient with NODAT. On admission serum glucose was 531, Patient was not in DKA. Patient will need to be discharged on insulin (basal bolus v basal and oral). Posttransplant DM patients have decreased mortality with tighter glycemic control to achieve A1c close to 7%.       Hyperglycemia in post transplant DM  A1c 8.2%, goal ~7%  FS premeal and qhs   goal <140  carb consistent diet  Patient will continue Prednisone 60mg daily on discharge.   Recommend decrease Lantus to 10 units qhs   Continue Humalog 4units premeal   Recommend low premeal and qhs correctional scales   DM teaching, recommend reteach patient how to give himself insulin  Nutrition consult  On discharge, patient can continue basal/bolus insulin or basal insulin and oral hypoglycemic medication (ie Prandin or tradjenta)  Patient can follow up in [facility]  [address] [place] [address] [phone] to help titrate dose of insulin    HTN   goal 130/80, above goal  Will defer management to primary team     HLD   goal LDL<70  consider lipid profile   Continue pravastatin         Elo Brandon MD  Endocrine Fellow   Pager  70y Male w/ NICM s/p HM2 s/p OHT on 2/23/18 with coronary fistula, HCV+ s/p Rx, CKD (baseline Cr 1.4), with recent admission for hemolytic anemia of unclear etiology from 4/29-5/7. Patient was treated w/ prednisone and Rituximab. Tacrolimus was discontinued and patient was also transitioned to Everolimus. Endocrinology consult requested for management of hyperglycemia in patient with NODAT. On admission serum glucose was 531, Patient was not in DKA. Patient will need to be discharged on insulin (basal bolus v basal and oral). Posttransplant DM patients have decreased mortality with tighter glycemic control to achieve A1c close to 7%.       Hyperglycemia in post transplant DM  A1c 8.2%, goal ~7%  FS premeal and qhs   goal <140  carb consistent diet  Patient will continue Prednisone 60mg daily on discharge.   Recommend decrease Lantus 18 units qhs   Recommend increase Humalog 5 units premeal   Recommend low premeal and qhs correctional scales   DM teaching, recommend reteach patient how to give himself insulin  Nutrition consult  On discharge, patient can continue basal/bolus insulin or basal insulin and oral hypoglycemic medication (ie Prandin or tradjenta)  Patient can follow up in [facility]  [address] [place] [address] [phone] to help titrate dose of insulin    HTN   goal 130/80, above goal  Will defer management to primary team     HLD   goal LDL<70  consider lipid profile   Continue pravastatin         Elo Brandon MD  Endocrine Fellow   Pager  70y Male w/ NICM s/p HM2 s/p OHT on 2/23/18 with coronary fistula, HCV+ s/p Rx, CKD (baseline Cr 1.4), with recent admission for hemolytic anemia of unclear etiology from 4/29-5/7. Patient was treated w/ prednisone and Rituximab. Tacrolimus was discontinued and patient was also transitioned to Everolimus. Endocrinology consult requested for management of hyperglycemia in patient with NODAT. On admission serum glucose was 531, Patient was not in DKA. Patient will need to be discharged on insulin (basal bolus v basal and oral). Posttransplant DM patients have decreased mortality with tighter glycemic control to achieve A1c close to 7%.       Hyperglycemia in post transplant DM  A1c 8.2%, goal ~7%  FS premeal and qhs   goal <140  carb consistent diet  Patient will continue Prednisone 60mg daily on discharge.   Recommend [id number] units qhs   Recommend increase Humalog 5 units premeal   Recommend low premeal and qhs correctional scales   DM teaching, recommend reteach patient how to give himself insulin  Nutrition consult  On discharge, patient can continue basal/bolus insulin or basal insulin and oral hypoglycemic medication (ie Prandin or tradjenta)  Patient can follow up in [facility]  [address] [place] [address] [phone] to help titrate dose of insulin    HTN   goal 130/80, above goal  Will defer management to primary team     HLD   goal LDL<70  consider lipid profile   Continue pravastatin         Elo Brandon MD  Endocrine Fellow   Pager

## 2020-06-17 NOTE — CONSULT NOTE ADULT - ASSESSMENT
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 Patient was treated w/ prednisone and Rituximab. Tacro was discontinued and patient was also transitioned to everolimus. Patient now presents with hyperglycemia blood sugar 400-500s. Endocrinology consulted and patient was admitted to [facility] telemetry.    ID asked to evaluate to r/o underlying infection as cause of his hyperglycemia    Appears well on exam  lungs- clear  wounds well healed  no sores on his feet  Elevated fungitell noted on last admission with scarring in his RUL on chest CT scan    will repeat fungitell and galactomannan  continue the posaconazole    check CMV viral load- continue valganciclovir  WBC improved to wnl    continue bactrim in the setting of increased immune suppression with high dose steroids  potassium not elevated will monitor in setting of CKD    Gary Lujan MD  [phone]  After 5pm/weekends [phone]

## 2020-06-18 LAB
ALBUMIN SERPL ELPH-MCNC: 3.9 G/DL — SIGNIFICANT CHANGE UP (ref 3.3–5)
ALP SERPL-CCNC: 81 U/L — SIGNIFICANT CHANGE UP (ref 40–120)
ALT FLD-CCNC: 33 U/L — SIGNIFICANT CHANGE UP (ref 10–45)
ANION GAP SERPL CALC-SCNC: 12 MMOL/L — SIGNIFICANT CHANGE UP (ref 5–17)
AST SERPL-CCNC: 25 U/L — SIGNIFICANT CHANGE UP (ref 10–40)
BILIRUB SERPL-MCNC: 1 MG/DL — SIGNIFICANT CHANGE UP (ref 0.2–1.2)
BUN SERPL-MCNC: 26 MG/DL — HIGH (ref 7–23)
CALCIUM SERPL-MCNC: 8.7 MG/DL — SIGNIFICANT CHANGE UP (ref 8.4–10.5)
CHLORIDE SERPL-SCNC: 105 MMOL/L — SIGNIFICANT CHANGE UP (ref 96–108)
CO2 SERPL-SCNC: 23 MMOL/L — SIGNIFICANT CHANGE UP (ref 22–31)
CREAT SERPL-MCNC: 1.26 MG/DL — SIGNIFICANT CHANGE UP (ref 0.5–1.3)
EBV DNA SERPL NAA+PROBE-ACNC: SIGNIFICANT CHANGE UP
GLUCOSE BLDC GLUCOMTR-MCNC: 112 MG/DL — HIGH (ref 70–99)
GLUCOSE BLDC GLUCOMTR-MCNC: 247 MG/DL — HIGH (ref 70–99)
GLUCOSE BLDC GLUCOMTR-MCNC: 288 MG/DL — HIGH (ref 70–99)
GLUCOSE BLDC GLUCOMTR-MCNC: 96 MG/DL — SIGNIFICANT CHANGE UP (ref 70–99)
GLUCOSE SERPL-MCNC: 88 MG/DL — SIGNIFICANT CHANGE UP (ref 70–99)
HCT VFR BLD CALC: 28.7 % — LOW (ref 39–50)
HGB BLD-MCNC: 9.6 G/DL — LOW (ref 13–17)
MCHC RBC-ENTMCNC: 30.6 PG — SIGNIFICANT CHANGE UP (ref 27–34)
MCHC RBC-ENTMCNC: 33.4 GM/DL — SIGNIFICANT CHANGE UP (ref 32–36)
MCV RBC AUTO: 91.4 FL — SIGNIFICANT CHANGE UP (ref 80–100)
NRBC # BLD: 0 /100 WBCS — SIGNIFICANT CHANGE UP (ref 0–0)
PLATELET # BLD AUTO: 116 K/UL — LOW (ref 150–400)
POTASSIUM SERPL-MCNC: 3.4 MMOL/L — LOW (ref 3.5–5.3)
POTASSIUM SERPL-SCNC: 3.4 MMOL/L — LOW (ref 3.5–5.3)
PROT SERPL-MCNC: 6.1 G/DL — SIGNIFICANT CHANGE UP (ref 6–8.3)
RBC # BLD: 3.14 M/UL — LOW (ref 4.2–5.8)
RBC # FLD: 15.9 % — HIGH (ref 10.3–14.5)
SODIUM SERPL-SCNC: 140 MMOL/L — SIGNIFICANT CHANGE UP (ref 135–145)
WBC # BLD: 6.98 K/UL — SIGNIFICANT CHANGE UP (ref 3.8–10.5)
WBC # FLD AUTO: 6.98 K/UL — SIGNIFICANT CHANGE UP (ref 3.8–10.5)

## 2020-06-18 PROCEDURE — 99233 SBSQ HOSP IP/OBS HIGH 50: CPT

## 2020-06-18 PROCEDURE — 99232 SBSQ HOSP IP/OBS MODERATE 35: CPT | Mod: GC

## 2020-06-18 PROCEDURE — 99232 SBSQ HOSP IP/OBS MODERATE 35: CPT

## 2020-06-18 RX ORDER — POTASSIUM CHLORIDE 20 MEQ
20 PACKET (EA) ORAL
Refills: 0 | Status: COMPLETED | OUTPATIENT
Start: 2020-06-18 | End: 2020-06-18

## 2020-06-18 RX ADMIN — Medication 81 MILLIGRAM(S): at 11:57

## 2020-06-18 RX ADMIN — SODIUM CHLORIDE 3 MILLILITER(S): 9 INJECTION INTRAMUSCULAR; INTRAVENOUS; SUBCUTANEOUS at 21:09

## 2020-06-18 RX ADMIN — INSULIN GLARGINE 18 UNIT(S): 100 INJECTION, SOLUTION SUBCUTANEOUS at 21:49

## 2020-06-18 RX ADMIN — Medication 20 MILLIEQUIVALENT(S): at 12:07

## 2020-06-18 RX ADMIN — Medication 60 MILLIGRAM(S): at 08:14

## 2020-06-18 RX ADMIN — Medication 1 MILLIGRAM(S): at 11:57

## 2020-06-18 RX ADMIN — SODIUM CHLORIDE 3 MILLILITER(S): 9 INJECTION INTRAMUSCULAR; INTRAVENOUS; SUBCUTANEOUS at 06:30

## 2020-06-18 RX ADMIN — Medication 3: at 17:02

## 2020-06-18 RX ADMIN — PANTOPRAZOLE SODIUM 40 MILLIGRAM(S): 20 TABLET, DELAYED RELEASE ORAL at 06:32

## 2020-06-18 RX ADMIN — Medication 5 UNIT(S): at 08:15

## 2020-06-18 RX ADMIN — Medication 650 MILLIGRAM(S): at 17:03

## 2020-06-18 RX ADMIN — Medication 20 MILLIGRAM(S): at 20:02

## 2020-06-18 RX ADMIN — EVEROLIMUS 0.75 MILLIGRAM(S): 10 TABLET ORAL at 08:13

## 2020-06-18 RX ADMIN — Medication 1 TABLET(S): at 11:58

## 2020-06-18 RX ADMIN — Medication 650 MILLIGRAM(S): at 06:32

## 2020-06-18 RX ADMIN — Medication 5 UNIT(S): at 17:02

## 2020-06-18 RX ADMIN — EVEROLIMUS 0.75 MILLIGRAM(S): 10 TABLET ORAL at 20:02

## 2020-06-18 RX ADMIN — MAGNESIUM OXIDE 400 MG ORAL TABLET 400 MILLIGRAM(S): 241.3 TABLET ORAL at 11:57

## 2020-06-18 RX ADMIN — POSACONAZOLE 300 MILLIGRAM(S): 100 TABLET, DELAYED RELEASE ORAL at 11:59

## 2020-06-18 RX ADMIN — Medication 20 MILLIEQUIVALENT(S): at 09:40

## 2020-06-18 RX ADMIN — SODIUM CHLORIDE 3 MILLILITER(S): 9 INJECTION INTRAMUSCULAR; INTRAVENOUS; SUBCUTANEOUS at 15:08

## 2020-06-18 RX ADMIN — Medication 1000 UNIT(S): at 11:57

## 2020-06-18 RX ADMIN — VALGANCICLOVIR 450 MILLIGRAM(S): 450 TABLET, FILM COATED ORAL at 11:58

## 2020-06-18 RX ADMIN — Medication 5 UNIT(S): at 11:56

## 2020-06-18 NOTE — DIETITIAN INITIAL EVALUATION ADULT. - OTHER INFO
Pt seen for LOS.   Pt reports feeling well and admits to a good PO intake in house and PTA.  Pt VIRAMONTES does all food shopping and cooking. Breakfast: Oatmeal, egg or pancakes. Lunch: chicken pigs tail with beans. Dinner tune sandwich on whole wheat bread.   Pt drinks water or sugar free coolaid, Snacks on cookies, ice cream.     Pt with Known DM. Has been checking BG levels 2x daily, states recently Bg levels were normal around 90-120mg/dl but most recently have increased to >400mg/dl thus having admission.   Pt was interested in T2DM nutrition therapy. RD reviewed consistent CHO diet, encouraged portion control, pairing CHO with protein, monitoring added sugar, checking BG levels and a consistent eating pattern. Reviewed normal BG ranges and discussed S&S and treatment of hypo/hyperglycemia. Written materials provided.     UBW: 160-163lbs, Pt denies any recent weight changes.   Pt denies GI distress, chewing/swallowing difficulty, micronutrient supplements. NKFA

## 2020-06-18 NOTE — PROGRESS NOTE ADULT - PROBLEM SELECTOR PLAN 1
Patient will be discharged tomorrow on Lantus 15 U and Humalog 5 U TID pre meal as per Endocrinology.   Check FS AC/HS  Continue diabetic diet  Reinforce Patient education on insulin administration again before discharge

## 2020-06-18 NOTE — PROGRESS NOTE ADULT - SUBJECTIVE AND OBJECTIVE BOX
Subjective: Patient denies CP, SOB, nausea, vomiting, headache. No acute events overnight.     VITAL SIGNS    Telemetry:  -120's   Vital Signs Last 24 Hrs  T(C): 36.7 (20 @ 12:17), Max: 36.7 (20 @ 05:08)  T(F): 98.1 (20 @ 12:17), Max: 98.1 (20 @ 05:08)  HR: 120 (20 @ 12:17) (102 - 123)  BP: 135/88 (20 @ 12:17) (135/88 - 151/99)  RR: 18 (20 @ 12:17) (18 - 18)  SpO2: 98% (20 @ 12:17) (97% - 99%)             @ 07:01  -   @ 07:00  --------------------------------------------------------  IN: 940 mL / OUT: 1800 mL / NET: -860 mL     @ 07:01  -   @ 15:47  --------------------------------------------------------  IN: 480 mL / OUT: 1000 mL / NET: -520 mL       Daily     Daily Weight in k.9 (2020 13:25)  Admit Wt: Drug Dosing Weight  Height (cm): 172.72 (2020 17:18)  Weight (kg): 73 (2020 17:18)  BMI (kg/m2): 24.5 (2020 17:18)  BSA (m2): 1.86 (2020 17:18)    Bilirubin Total, Serum: 1.0 mg/dL ( @ 08:31)    CAPILLARY BLOOD GLUCOSE      POCT Blood Glucose.: 112 mg/dL (2020 11:46)  POCT Blood Glucose.: 96 mg/dL (2020 07:44)  POCT Blood Glucose.: 295 mg/dL (2020 21:48)  POCT Blood Glucose.: 257 mg/dL (2020 16:57)          MEDICATIONS  acetaminophen   Tablet .. 650 milliGRAM(s) Oral every 6 hours PRN  aspirin enteric coated 81 milliGRAM(s) Oral daily  cholecalciferol 1000 Unit(s) Oral daily  dextrose 40% Gel 15 Gram(s) Oral once PRN  dextrose 5%. 1000 milliLiter(s) IV Continuous <Continuous>  dextrose 50% Injectable 12.5 Gram(s) IV Push once  dextrose 50% Injectable 25 Gram(s) IV Push once  dextrose 50% Injectable 25 Gram(s) IV Push once  everolimus (ZORTRESS) 0.75 milliGRAM(s) Oral <User Schedule>  folic acid 1 milliGRAM(s) Oral daily  glucagon  Injectable 1 milliGRAM(s) IntraMuscular once PRN  insulin glargine Injectable (LANTUS) 18 Unit(s) SubCutaneous at bedtime  insulin lispro (HumaLOG) corrective regimen sliding scale   SubCutaneous three times a day before meals  insulin lispro (HumaLOG) corrective regimen sliding scale   SubCutaneous at bedtime  insulin lispro Injectable (HumaLOG) 5 Unit(s) SubCutaneous three times a day with meals  magnesium oxide 400 milliGRAM(s) Oral daily  pantoprazole    Tablet 40 milliGRAM(s) Oral before breakfast  posaconazole DR Tablet 300 milliGRAM(s) Oral daily  pravastatin 20 milliGRAM(s) Oral at bedtime  predniSONE   Tablet 60 milliGRAM(s) Oral <User Schedule>  sodium bicarbonate 650 milliGRAM(s) Oral two times a day  sodium chloride 0.9% lock flush 3 milliLiter(s) IV Push every 8 hours  trimethoprim   80 mG/sulfamethoxazole 400 mG 1 Tablet(s) Oral daily  valGANciclovir 450 milliGRAM(s) Oral daily      >>> <<<  PHYSICAL EXAM  Neurology: alert and oriented x 3, nonfocal, no gross deficits  CV : RRR+S1S2  Lungs: Respirations non-labored, B/L BS CTA  Abdomen: Soft, NT/ND, +BSx4Q, last BM  (-)N/V/D  : Voiding without difficulty  Extremities: B/L LE no edema, negative calf tenderness, +PP        LABS  -    140  |  105  |  26<H>  ----------------------------<  88  3.4<L>   |  23  |  1.26    Ca    8.7      2020 08:31  Phos  3.0     06-16  Mg     2.1     06-16    TPro  6.1  /  Alb  3.9  /  TBili  1.0  /  DBili  x   /  AST  25  /  ALT  33  /  AlkPhos  81  06-18                                 9.6    6.98  )-----------( 116      ( 2020 08:31 )             28.7                 PAST MEDICAL & SURGICAL HISTORY:  H/O hemolytic anemia  H/O autoimmune hemolytic anemia  Knee pain, right  HLD (hyperlipidemia)  Former smoker  DVT of upper extremity (deep vein thrombosis)  Hepatitis C virus  GIB (gastrointestinal bleeding)  Ventricular fibrillation: s/p AICD  PAF (paroxysmal atrial fibrillation): on xarelto  Non-Ischemic Cardiomyopathy  SVT (Supraventricular Tachycardia)  HTN  CHF (Congestive Heart Failure)  S/P right heart catheterization: biopsy multiple  H/O heart transplant: 2018  Status post left hip replacement  History of Prior Ablation Treatment: for afib  AICD (Automatic Cardioverter/Defibrillator) Present: [employer] with 1 [employer] lead09- explanted and replaced with [employer] 2 leads on 09

## 2020-06-18 NOTE — PROGRESS NOTE ADULT - ASSESSMENT
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 Patient was treated w/ prednisone and Rituximab. Tacro was discontinued and patient was also transitioned to everolimus. Patient now presents with hyperglycemia blood sugar 400-500s. Endocrinology consulted and patient was admitted to [facility] telemetry.    ID asked to evaluate to r/o underlying infection as cause of his hyperglycemia    Appears well on exam  lungs- clear  wounds well healed  no sores on his feet  Elevated fungitell noted on last admission with scarring in his RUL on chest CT scan    will repeat fungitell and galactomannan  continue the posaconazole    check CMV viral load- continue valganciclovir  WBC improved to wnl    EBV PCR is non det- makes underlying PTLD unlikely    continue bactrim in the setting of increased immune suppression with high dose steroids  potassium not elevated will monitor in setting of CKD    Gary Lujan MD  [phone]  After 5pm/weekends [phone]

## 2020-06-18 NOTE — DIETITIAN INITIAL EVALUATION ADULT. - ENERGY NEEDS
Ht: 5'8", Wt: 163lbs, BMI: 24.7kg/m2, IBW: 154lbs(+/-10%), 105%IBW  Pertinent information: 70 year old male with PMHx of NICM s/p HM2 with subsequent OHT in 2/2018 with CKD, and recent admission for hemolytic anemia being treated with prednisone and Rituximab. Pt now admitted hyperglycemia while on prednisone.   No Edema, Skin: intact

## 2020-06-18 NOTE — DIETITIAN INITIAL EVALUATION ADULT. - ADD RECOMMEND
1. Provide food preferences as requested by Pt/family within diet restrictions  2. Encourage PO intake during meal times 3. Reviewed menu ordering procedures 4. Gave DM education 5. Trend BG levels, renal indices, LFT's, electrolytes and triglycerides BG levels.

## 2020-06-18 NOTE — PROGRESS NOTE ADULT - SUBJECTIVE AND OBJECTIVE BOX
Chief Complaint: hyperglycemia in patient with NODAT on prednisone daily     History: Patient seen and evaluated at bedside.     MEDICATIONS  (STANDING):  aspirin enteric coated 81 milliGRAM(s) Oral daily  cholecalciferol 1000 Unit(s) Oral daily  dextrose 5%. 1000 milliLiter(s) (50 mL/Hr) IV Continuous <Continuous>  dextrose 50% Injectable 12.5 Gram(s) IV Push once  dextrose 50% Injectable 25 Gram(s) IV Push once  dextrose 50% Injectable 25 Gram(s) IV Push once  everolimus (ZORTRESS) 0.75 milliGRAM(s) Oral <User Schedule>  folic acid 1 milliGRAM(s) Oral daily  insulin glargine Injectable (LANTUS) 18 Unit(s) SubCutaneous at bedtime  insulin lispro (HumaLOG) corrective regimen sliding scale   SubCutaneous three times a day before meals  insulin lispro (HumaLOG) corrective regimen sliding scale   SubCutaneous at bedtime  insulin lispro Injectable (HumaLOG) 5 Unit(s) SubCutaneous three times a day with meals  magnesium oxide 400 milliGRAM(s) Oral daily  pantoprazole    Tablet 40 milliGRAM(s) Oral before breakfast  posaconazole DR Tablet 300 milliGRAM(s) Oral daily  pravastatin 20 milliGRAM(s) Oral at bedtime  predniSONE   Tablet 60 milliGRAM(s) Oral <User Schedule>  sodium bicarbonate 650 milliGRAM(s) Oral two times a day  sodium chloride 0.9% lock flush 3 milliLiter(s) IV Push every 8 hours  trimethoprim   80 mG/sulfamethoxazole 400 mG 1 Tablet(s) Oral daily  valGANciclovir 450 milliGRAM(s) Oral daily    MEDICATIONS  (PRN):  acetaminophen   Tablet .. 650 milliGRAM(s) Oral every 6 hours PRN Mild Pain (1 - 3)  dextrose 40% Gel 15 Gram(s) Oral once PRN Blood Glucose LESS THAN 70 milliGRAM(s)/deciLiter  glucagon  Injectable 1 milliGRAM(s) IntraMuscular once PRN Glucose <70 milliGRAM(s)/deciLiter    PHYSICAL EXAM:  VITALS: T(C): 36.7 (06-18-20 @ 12:17)  T(F): 98.1 (06-18-20 @ 12:17), Max: 98.1 (06-18-20 @ 05:08)  HR: 120 (06-18-20 @ 12:17) (102 - 123)  BP: 135/88 (06-18-20 @ 12:17) (135/88 - 151/99)  RR:  (18 - 18)  SpO2:  (97% - 99%)  Wt(kg): 73kg   GENERAL: NAD, well-groomed, well-developed  RESPIRATORY: Clear to auscultation bilaterally; No rales, rhonchi, wheezing, or rubs  CARDIOVASCULAR: Regular rate and rhythm; No murmurs; no peripheral edema  GI: Soft, nontender, non distended  SKIN: Dry, intact, No rashes or lesions  MUSCULOSKELETAL: Full range of motion, normal strength  PSYCH: Alert and oriented x 3, reactive affect       POCT Blood Glucose.: 112 mg/dL (06-18-20 @ 11:46) H5  POCT Blood Glucose.: 96 mg/dL (06-18-20 @ 07:44) H5   POCT Blood Glucose.: 295 mg/dL (06-17-20 @ 21:48) L18 H5  POCT Blood Glucose.: 257 mg/dL (06-17-20 @ 16:57)  H4+3  POCT Blood Glucose.: 119 mg/dL (06-17-20 @ 12:14) H4  POCT Blood Glucose.: 165 mg/dL (06-17-20 @ 07:50) H4+1  POCT Blood Glucose.: 257 mg/dL (06-17-20 @ 04:47)  POCT Blood Glucose.: 136 mg/dL (06-17-20 @ 00:34)  POCT Blood Glucose.: 357 mg/dL (06-16-20 @ 20:42)  POCT Blood Glucose.: 464 mg/dL (06-16-20 @ 19:06)  POCT Blood Glucose.: 470 mg/dL (06-16-20 @ 19:05)  POCT Blood Glucose.: 458 mg/dL (06-16-20 @ 18:52)  POCT Blood Glucose.: 519 mg/dL (06-16-20 @ 18:51)  POCT Blood Glucose.: 511 mg/dL (06-16-20 @ 18:22)  POCT Blood Glucose.: 521 mg/dL (06-16-20 @ 17:24)  POCT Blood Glucose.: 496 mg/dL (06-16-20 @ 14:37)      06-18    140  |  105  |  26<H>  ----------------------------<  88  3.4<L>   |  23  |  1.26    EGFR if : 67  EGFR if non : 57<L>    Ca    8.7      06-18  Mg     2.1     06-16  Phos  3.0     06-16    TPro  6.1  /  Alb  3.9  /  TBili  1.0  /  DBili  x   /  AST  25  /  ALT  33  /  AlkPhos  81  06-18 Chief Complaint: hyperglycemia in patient with NODAT on prednisone daily     History: Patient seen and evaluated at bedside. Patient reports he was retaught how to give himself insulin. Patient reports appetite is good. No nausea or vomiting.     MEDICATIONS  (STANDING):  aspirin enteric coated 81 milliGRAM(s) Oral daily  cholecalciferol 1000 Unit(s) Oral daily  dextrose 5%. 1000 milliLiter(s) (50 mL/Hr) IV Continuous <Continuous>  dextrose 50% Injectable 12.5 Gram(s) IV Push once  dextrose 50% Injectable 25 Gram(s) IV Push once  dextrose 50% Injectable 25 Gram(s) IV Push once  everolimus (ZORTRESS) 0.75 milliGRAM(s) Oral <User Schedule>  folic acid 1 milliGRAM(s) Oral daily  insulin glargine Injectable (LANTUS) 18 Unit(s) SubCutaneous at bedtime  insulin lispro (HumaLOG) corrective regimen sliding scale   SubCutaneous three times a day before meals  insulin lispro (HumaLOG) corrective regimen sliding scale   SubCutaneous at bedtime  insulin lispro Injectable (HumaLOG) 5 Unit(s) SubCutaneous three times a day with meals  magnesium oxide 400 milliGRAM(s) Oral daily  pantoprazole    Tablet 40 milliGRAM(s) Oral before breakfast  posaconazole DR Tablet 300 milliGRAM(s) Oral daily  pravastatin 20 milliGRAM(s) Oral at bedtime  predniSONE   Tablet 60 milliGRAM(s) Oral <User Schedule>  sodium bicarbonate 650 milliGRAM(s) Oral two times a day  sodium chloride 0.9% lock flush 3 milliLiter(s) IV Push every 8 hours  trimethoprim   80 mG/sulfamethoxazole 400 mG 1 Tablet(s) Oral daily  valGANciclovir 450 milliGRAM(s) Oral daily    MEDICATIONS  (PRN):  acetaminophen   Tablet .. 650 milliGRAM(s) Oral every 6 hours PRN Mild Pain (1 - 3)  dextrose 40% Gel 15 Gram(s) Oral once PRN Blood Glucose LESS THAN 70 milliGRAM(s)/deciLiter  glucagon  Injectable 1 milliGRAM(s) IntraMuscular once PRN Glucose <70 milliGRAM(s)/deciLiter    PHYSICAL EXAM:  VITALS: T(C): 36.7 (06-18-20 @ 12:17)  T(F): 98.1 (06-18-20 @ 12:17), Max: 98.1 (06-18-20 @ 05:08)  HR: 120 (06-18-20 @ 12:17) (102 - 123)  BP: 135/88 (06-18-20 @ 12:17) (135/88 - 151/99)  RR:  (18 - 18)  SpO2:  (97% - 99%)  Wt(kg): 73kg   GENERAL: NAD, well-groomed, well-developed  RESPIRATORY: Clear to auscultation bilaterally; No rales, rhonchi, wheezing, or rubs  CARDIOVASCULAR: Regular rate and rhythm; No murmurs; no peripheral edema  GI: Soft, nontender, non distended  SKIN: Dry, intact, No rashes or lesions  MUSCULOSKELETAL: Full range of motion, normal strength  PSYCH: Alert and oriented x 3, reactive affect       POCT Blood Glucose.: 112 mg/dL (06-18-20 @ 11:46) H5  POCT Blood Glucose.: 96 mg/dL (06-18-20 @ 07:44) H5   POCT Blood Glucose.: 295 mg/dL (06-17-20 @ 21:48) L18 H5  POCT Blood Glucose.: 257 mg/dL (06-17-20 @ 16:57)  H4+3  POCT Blood Glucose.: 119 mg/dL (06-17-20 @ 12:14) H4  POCT Blood Glucose.: 165 mg/dL (06-17-20 @ 07:50) H4+1  POCT Blood Glucose.: 257 mg/dL (06-17-20 @ 04:47)  POCT Blood Glucose.: 136 mg/dL (06-17-20 @ 00:34)  POCT Blood Glucose.: 357 mg/dL (06-16-20 @ 20:42)  POCT Blood Glucose.: 464 mg/dL (06-16-20 @ 19:06)  POCT Blood Glucose.: 470 mg/dL (06-16-20 @ 19:05)  POCT Blood Glucose.: 458 mg/dL (06-16-20 @ 18:52)  POCT Blood Glucose.: 519 mg/dL (06-16-20 @ 18:51)  POCT Blood Glucose.: 511 mg/dL (06-16-20 @ 18:22)  POCT Blood Glucose.: 521 mg/dL (06-16-20 @ 17:24)  POCT Blood Glucose.: 496 mg/dL (06-16-20 @ 14:37)      06-18    140  |  105  |  26<H>  ----------------------------<  88  3.4<L>   |  23  |  1.26    EGFR if : 67  EGFR if non : 57<L>    Ca    8.7      06-18  Mg     2.1     06-16  Phos  3.0     06-16    TPro  6.1  /  Alb  3.9  /  TBili  1.0  /  DBili  x   /  AST  25  /  ALT  33  /  AlkPhos  81  06-18

## 2020-06-18 NOTE — PROGRESS NOTE ADULT - SUBJECTIVE AND OBJECTIVE BOX
Subjective: No overnight events and no complaints. He feels well today.     Medications:  acetaminophen   Tablet .. 650 milliGRAM(s) Oral every 6 hours PRN  aspirin enteric coated 81 milliGRAM(s) Oral daily  cholecalciferol 1000 Unit(s) Oral daily  everolimus (ZORTRESS) 0.75 milliGRAM(s) Oral <User Schedule>  folic acid 1 milliGRAM(s) Oral daily  glucagon  Injectable 1 milliGRAM(s) IntraMuscular once PRN  insulin glargine Injectable (LANTUS) 18 Unit(s) SubCutaneous at bedtime  magnesium oxide 400 milliGRAM(s) Oral daily  pantoprazole    Tablet 40 milliGRAM(s) Oral before breakfast  posaconazole DR Tablet 300 milliGRAM(s) Oral daily  potassium chloride    Tablet ER 20 milliEquivalent(s) Oral every 2 hours  pravastatin 20 milliGRAM(s) Oral at bedtime  predniSONE   Tablet 60 milliGRAM(s) Oral <User Schedule>  sodium bicarbonate 650 milliGRAM(s) Oral two times a day  sodium chloride 0.9% lock flush 3 milliLiter(s) IV Push every 8 hours  trimethoprim   80 mG/sulfamethoxazole 400 mG 1 Tablet(s) Oral daily  valGANciclovir 450 milliGRAM(s) Oral daily      Physical Exam:    Vital Signs Last 24 Hrs  T(C): 36.7 (2020 05:08), Max: 36.7 (2020 05:08)  T(F): 98.1 (2020 05:08), Max: 98.1 (2020 05:08)  HR: 102 (2020 05:08) (102 - 123)  BP: 144/93 (2020 05:08) (143/90 - 151/99)  RR: 18 (2020 05:08) (18 - 18)  SpO2: 97% (2020 05:08) (97% - 99%)    Daily Weight in k (2020 07:45)    I&O's Summary    2020 07:01  -  2020 07:00  --------------------------------------------------------  IN: 940 mL / OUT: 1800 mL / NET: -860 mL    2020 07:01  -  2020 10:46  --------------------------------------------------------  IN: 240 mL / OUT: 200 mL / NET: 40 mL    General: No distress. Comfortable.  HEENT: EOM intact.  Neck: Neck supple. JVP not elevated. No masses  Chest: Clear to auscultation bilaterally  CV: Normal S1 and S2.  Radial pulses normal.  Abdomen: Soft, non-distended, non-tender  Skin: No rashes or skin breakdown  Neurology: Alert and oriented times three. Sensation intact  Psych: Affect normal    Labs:                        9.6    6.98  )-----------( 116      ( 2020 08:31 )             28.7     06-18    140  |  105  |  26<H>  ----------------------------<  88  3.4<L>   |  23  |  1.26    Ca    8.7      2020 08:31  Phos  3.0     06-16  Mg     2.1     06-16    TPro  6.1  /  Alb  3.9  /  TBili  1.0  /  DBili  x   /  AST  25  /  ALT  33  /  AlkPhos  81    Lactate Dehydrogenase, Serum: 376 U/L ( @ 08:51)  Lactate Dehydrogenase, Serum: 390 U/L ( @ 08:50)

## 2020-06-18 NOTE — DIETITIAN INITIAL EVALUATION ADULT. - PERTINENT MEDS FT
Lantus  insulin sliding scale  Humalog  Vit D3  KCL  Protonix  Prednisone  Everlimus  Folic acid   Magox  Nabicarb

## 2020-06-18 NOTE — PROGRESS NOTE ADULT - PROBLEM SELECTOR PLAN 1
- Awaiting endocrine recommendations today. He will likely need assistance at home to help with insulin administration.

## 2020-06-18 NOTE — PROGRESS NOTE ADULT - ASSESSMENT
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 Patient was treated w/ prednisone and Rituximab. Tacro was discontinued and patient was also transitioned to everolimus.  Patient now presents with hyperglycemia blood sugar 400-500s and concern that he may have taken a whole syringe (though it appears he likely did not) He shows no signs of hyperglycemia (other than polyuria) nor hypoglycemia. His LDH is elevated indicating possible ongoing AIHA, though hgb is stable. ID involved to help r/o infectious etiology of hyperglycemia.    He is now clinically stable with improved glycemia.

## 2020-06-18 NOTE — DIETITIAN INITIAL EVALUATION ADULT. - PERTINENT LABORATORY DATA
06-18 @ 08:31: Sodium 140, Potassium 3.4<L>, Chloride 105, Calcium 8.7, Magnesium --, Phosphorus --, BUN 26<H>, Creatinine 1.26, BG 88, Alk Phos 81, ALT/SGPT 33, AST/SGOT 25, HbA1c 8.2, Total Protein 6.1, Albumin 3.9,  Total Bilirubin 1.0,  Hemoglobin 9.6<L>, Hematocrit 28.7<L>  BG levels: 6/17: 119-295, 6/18: 96

## 2020-06-18 NOTE — PROGRESS NOTE ADULT - ASSESSMENT
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 Patient was treated w/ prednisone and Rituximab. Tacro was discontinued and patient was also transitioned to everolimus. Patient now presents with hyperglycemia blood sugar 400-500s. Endocrinology consulted and patient was admitted to [facility] telemetry.       6/17 VSS, BS trending down to 165 this AM and 140 on BMP. Pending endocrine recs.  6/18 VSS: Patient education on insulin administration was reinforced. Endo consulted and recommends discharge on 15 Lantus and 5 Humalog TID. Discharge plan for tomorrow.

## 2020-06-18 NOTE — PROGRESS NOTE ADULT - PROBLEM SELECTOR PLAN 3
- Immunosuppression: In addition to prednisone, he will continue everolimus. Level pending. Goal 8-10.   - Antibiotic prophylaxis: He will continues posaconazole, valganciclovir, and bactrium.

## 2020-06-18 NOTE — PROGRESS NOTE ADULT - SUBJECTIVE AND OBJECTIVE BOX
INFECTIOUS DISEASES FOLLOW UP-- Sujey Lujan  [phone]    This is a follow up note for this  70yMale with  Hyperglycemia      ROS:  CONSTITUTIONAL:  No fever, good appetite  CARDIOVASCULAR:  No chest pain or palpitations  RESPIRATORY:  No dyspnea  GASTROINTESTINAL:  No nausea, vomiting, diarrhea, or abdominal pain  GENITOURINARY:  No dysuria  NEUROLOGIC:  No headache,     Allergies    No Known Allergies    Intolerances        ANTIBIOTICS/RELEVANT:  antimicrobials  posaconazole DR Tablet 300 milliGRAM(s) Oral daily  trimethoprim   80 mG/sulfamethoxazole 400 mG 1 Tablet(s) Oral daily  valGANciclovir 450 milliGRAM(s) Oral daily    immunologic:  everolimus (ZORTRESS) 0.75 milliGRAM(s) Oral <User Schedule>    OTHER:  acetaminophen   Tablet .. 650 milliGRAM(s) Oral every 6 hours PRN  aspirin enteric coated 81 milliGRAM(s) Oral daily  cholecalciferol 1000 Unit(s) Oral daily  dextrose 40% Gel 15 Gram(s) Oral once PRN  dextrose 5%. 1000 milliLiter(s) IV Continuous <Continuous>  dextrose 50% Injectable 12.5 Gram(s) IV Push once  dextrose 50% Injectable 25 Gram(s) IV Push once  dextrose 50% Injectable 25 Gram(s) IV Push once  folic acid 1 milliGRAM(s) Oral daily  glucagon  Injectable 1 milliGRAM(s) IntraMuscular once PRN  insulin glargine Injectable (LANTUS) 18 Unit(s) SubCutaneous at bedtime  insulin lispro (HumaLOG) corrective regimen sliding scale   SubCutaneous three times a day before meals  insulin lispro (HumaLOG) corrective regimen sliding scale   SubCutaneous at bedtime  insulin lispro Injectable (HumaLOG) 5 Unit(s) SubCutaneous three times a day with meals  magnesium oxide 400 milliGRAM(s) Oral daily  pantoprazole    Tablet 40 milliGRAM(s) Oral before breakfast  pravastatin 20 milliGRAM(s) Oral at bedtime  predniSONE   Tablet 60 milliGRAM(s) Oral <User Schedule>  sodium bicarbonate 650 milliGRAM(s) Oral two times a day  sodium chloride 0.9% lock flush 3 milliLiter(s) IV Push every 8 hours      Objective:  Vital Signs Last 24 Hrs  T(C): 36.7 (2020 12:17), Max: 36.7 (2020 05:08)  T(F): 98.1 (2020 12:17), Max: 98.1 (2020 05:08)  HR: 120 (2020 12:17) (102 - 120)  BP: 135/88 (2020 12:17) (135/88 - 144/93)  BP(mean): --  RR: 18 (2020 12:17) (18 - 18)  SpO2: 98% (2020 12:17) (97% - 98%)    PHYSICAL EXAM:  Constitutional:no acute distress  Eyes:WALT, EOMI  Ear/Nose/Throat: no oral lesions, 	  Respiratory: clear BL decreased right base  sternotomy healed  Cardiovascular: S1S2  Gastrointestinal:soft, (+) BS, no tenderness  Extremities:no e/e/c  No Lymphadenopathy  IV sites not inflammed.    LABS:                        9.6    6.98  )-----------( 116      ( 2020 08:31 )             28.7     06-18    140  |  105  |  26<H>  ----------------------------<  88  3.4<L>   |  23  |  1.26    Ca    8.7      2020 08:31  Phos  3.0     06-16  Mg     2.1     06-16    TPro  6.1  /  Alb  3.9  /  TBili  1.0  /  DBili  x   /  AST  25  /  ALT  33  /  AlkPhos  81  06-18      Urinalysis Basic - ( 2020 09:07 )    Color: Light Yellow / Appearance: Clear / S.018 / pH: x  Gluc: x / Ketone: Negative  / Bili: Negative / Urobili: Negative   Blood: x / Protein: 30 mg/dL / Nitrite: Negative   Leuk Esterase: Negative / RBC: 1 /hpf / WBC 1 /HPF   Sq Epi: x / Non Sq Epi: 0 /hpf / Bacteria: Negative        MICROBIOLOGY:      EBV PCR non det      RECENT CULTURES:   @ 16:59  .Blood Blood-Peripheral  --  --  --    No growth to date.  --   @ 14:19  .Blood Blood-Peripheral  --  --  --    No growth to date.  --      RADIOLOGY & ADDITIONAL STUDIES:    < from: Xray Chest 1 View AP/PA (20 @ 05:22) >  Impression:    The heart is normal in size. Elevated right hemidiaphragm which appears to be unchanged when compared to previous study done on 2020. The lungs appear to be clear. Status post sternotomy.. No pneumothorax.    < end of copied text >

## 2020-06-18 NOTE — PROGRESS NOTE ADULT - ASSESSMENT
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 Patient was treated w/ prednisone and Rituximab. Tacrolimus was discontinued and patient was also transitioned to Everolimus. Endocrinology consult requested for management of hyperglycemia in patient with NODAT.      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   Recommend decrease Lantus to 15 units qhs   Continue Humalog 5 units premeal   Continue low premeal and qhs correctional scales   DM teaching, recommend reteach patient how to give himself insulin  Nutrition consult  On discharge, patient can continue basal/bolus insulin or basal insulin and oral hypoglycemic medication (ie Prandin or tradjenta)  Patient can follow up in [facility]  [address] [place] [address] [phone] to help titrate dose of insulin    HTN   goal 130/80, above goal  Will defer management to primary team     HLD   goal LDL<70  consider lipid profile   Continue pravastatin         Elo Brandon MD  Endocrine Fellow   Pager  70y Male w/ NICM s/p HM2 s/p OHT on 2/23/18 with coronary fistula, HCV+ s/p Rx, CKD (baseline Cr 1.4), with recent admission for hemolytic anemia of unclear etiology from 4/29-5/7. Patient was treated w/ prednisone and Rituximab. Tacrolimus was discontinued and patient was also transitioned to Everolimus. Endocrinology consult requested for management of hyperglycemia in patient with NODAT.      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   Recommend decrease Lantus to 15 units qhs   Continue Humalog 5 units premeal   Continue low premeal and qhs correctional scales   DM teaching, recommend reteach patient how to give himself insulin  Nutrition consult  On discharge, patient can continue Lantus 15units qhs and Humalog 5units premeal. When prednisone is titrated down, will need to titrate insulin.   Recommend contact Endocrine clinic if FS <70 or >200 to adjust insulin.   Patient can follow up in [facility]  [address] [place] [address] [phone] to help titrate dose of insulin    HTN   goal 130/80, above goal  Will defer management to primary team     HLD   goal LDL<70  consider lipid profile   Continue pravastatin         Elo Brandon MD  Endocrine Fellow   Pager

## 2020-06-19 ENCOUNTER — TRANSCRIPTION ENCOUNTER (OUTPATIENT)
Age: 70
End: 2020-06-19

## 2020-06-19 VITALS — WEIGHT: 162.7 LBS

## 2020-06-19 LAB
ALBUMIN SERPL ELPH-MCNC: 3.9 G/DL — SIGNIFICANT CHANGE UP (ref 3.3–5)
ALP SERPL-CCNC: 79 U/L — SIGNIFICANT CHANGE UP (ref 40–120)
ALT FLD-CCNC: 31 U/L — SIGNIFICANT CHANGE UP (ref 10–45)
ANION GAP SERPL CALC-SCNC: 11 MMOL/L — SIGNIFICANT CHANGE UP (ref 5–17)
AST SERPL-CCNC: 24 U/L — SIGNIFICANT CHANGE UP (ref 10–40)
BILIRUB SERPL-MCNC: 1.1 MG/DL — SIGNIFICANT CHANGE UP (ref 0.2–1.2)
BUN SERPL-MCNC: 29 MG/DL — HIGH (ref 7–23)
CALCIUM SERPL-MCNC: 9 MG/DL — SIGNIFICANT CHANGE UP (ref 8.4–10.5)
CHLORIDE SERPL-SCNC: 104 MMOL/L — SIGNIFICANT CHANGE UP (ref 96–108)
CMV DNA CSF QL NAA+PROBE: SIGNIFICANT CHANGE UP
CMV DNA SPEC NAA+PROBE-LOG#: SIGNIFICANT CHANGE UP LOG10IU/ML
CO2 SERPL-SCNC: 23 MMOL/L — SIGNIFICANT CHANGE UP (ref 22–31)
CREAT SERPL-MCNC: 1.21 MG/DL — SIGNIFICANT CHANGE UP (ref 0.5–1.3)
FUNGITELL: <31 PG/ML — SIGNIFICANT CHANGE UP
GLUCOSE BLDC GLUCOMTR-MCNC: 101 MG/DL — HIGH (ref 70–99)
GLUCOSE BLDC GLUCOMTR-MCNC: 140 MG/DL — HIGH (ref 70–99)
GLUCOSE SERPL-MCNC: 91 MG/DL — SIGNIFICANT CHANGE UP (ref 70–99)
HCT VFR BLD CALC: 28.6 % — LOW (ref 39–50)
HGB BLD-MCNC: 9.5 G/DL — LOW (ref 13–17)
MCHC RBC-ENTMCNC: 30.6 PG — SIGNIFICANT CHANGE UP (ref 27–34)
MCHC RBC-ENTMCNC: 33.2 GM/DL — SIGNIFICANT CHANGE UP (ref 32–36)
MCV RBC AUTO: 92.3 FL — SIGNIFICANT CHANGE UP (ref 80–100)
NRBC # BLD: 0 /100 WBCS — SIGNIFICANT CHANGE UP (ref 0–0)
PLATELET # BLD AUTO: 122 K/UL — LOW (ref 150–400)
POTASSIUM SERPL-MCNC: 4.4 MMOL/L — SIGNIFICANT CHANGE UP (ref 3.5–5.3)
POTASSIUM SERPL-SCNC: 4.4 MMOL/L — SIGNIFICANT CHANGE UP (ref 3.5–5.3)
PROT SERPL-MCNC: 6 G/DL — SIGNIFICANT CHANGE UP (ref 6–8.3)
RBC # BLD: 3.1 M/UL — LOW (ref 4.2–5.8)
RBC # FLD: 16.2 % — HIGH (ref 10.3–14.5)
SODIUM SERPL-SCNC: 138 MMOL/L — SIGNIFICANT CHANGE UP (ref 135–145)
WBC # BLD: 7.44 K/UL — SIGNIFICANT CHANGE UP (ref 3.8–10.5)
WBC # FLD AUTO: 7.44 K/UL — SIGNIFICANT CHANGE UP (ref 3.8–10.5)

## 2020-06-19 PROCEDURE — 83735 ASSAY OF MAGNESIUM: CPT

## 2020-06-19 PROCEDURE — 80169 DRUG ASSAY EVEROLIMUS: CPT

## 2020-06-19 PROCEDURE — 82962 GLUCOSE BLOOD TEST: CPT

## 2020-06-19 PROCEDURE — 85027 COMPLETE CBC AUTOMATED: CPT

## 2020-06-19 PROCEDURE — 86901 BLOOD TYPING SEROLOGIC RH(D): CPT

## 2020-06-19 PROCEDURE — 86922 COMPATIBILITY TEST ANTIGLOB: CPT

## 2020-06-19 PROCEDURE — 99285 EMERGENCY DEPT VISIT HI MDM: CPT | Mod: 25

## 2020-06-19 PROCEDURE — 84100 ASSAY OF PHOSPHORUS: CPT

## 2020-06-19 PROCEDURE — 86900 BLOOD TYPING SEROLOGIC ABO: CPT

## 2020-06-19 PROCEDURE — 83615 LACTATE (LD) (LDH) ENZYME: CPT

## 2020-06-19 PROCEDURE — 80053 COMPREHEN METABOLIC PANEL: CPT

## 2020-06-19 PROCEDURE — 99232 SBSQ HOSP IP/OBS MODERATE 35: CPT

## 2020-06-19 PROCEDURE — 81001 URINALYSIS AUTO W/SCOPE: CPT

## 2020-06-19 PROCEDURE — 87040 BLOOD CULTURE FOR BACTERIA: CPT

## 2020-06-19 PROCEDURE — 86860 RBC ANTIBODY ELUTION: CPT

## 2020-06-19 PROCEDURE — 87449 NOS EACH ORGANISM AG IA: CPT

## 2020-06-19 PROCEDURE — 93005 ELECTROCARDIOGRAM TRACING: CPT

## 2020-06-19 PROCEDURE — 71045 X-RAY EXAM CHEST 1 VIEW: CPT

## 2020-06-19 PROCEDURE — 86870 RBC ANTIBODY IDENTIFICATION: CPT

## 2020-06-19 PROCEDURE — 87799 DETECT AGENT NOS DNA QUANT: CPT

## 2020-06-19 PROCEDURE — 86880 COOMBS TEST DIRECT: CPT

## 2020-06-19 PROCEDURE — 86850 RBC ANTIBODY SCREEN: CPT

## 2020-06-19 RX ORDER — ISOPROPYL ALCOHOL, BENZOCAINE .7; .06 ML/ML; ML/ML
1 SWAB TOPICAL
Qty: 100 | Refills: 1
Start: 2020-06-19 | End: 2020-08-07

## 2020-06-19 RX ORDER — SODIUM ZIRCONIUM CYCLOSILICATE 10 G/10G
1 POWDER, FOR SUSPENSION ORAL
Qty: 0 | Refills: 0 | DISCHARGE

## 2020-06-19 RX ORDER — ASPIRIN/CALCIUM CARB/MAGNESIUM 324 MG
1 TABLET ORAL
Qty: 0 | Refills: 0 | DISCHARGE
Start: 2020-06-19

## 2020-06-19 RX ORDER — MAGNESIUM OXIDE 400 MG ORAL TABLET 241.3 MG
1 TABLET ORAL
Qty: 0 | Refills: 0 | DISCHARGE
Start: 2020-06-19

## 2020-06-19 RX ORDER — INSULIN LISPRO 100/ML
7 VIAL (ML) SUBCUTANEOUS
Refills: 0 | Status: DISCONTINUED | OUTPATIENT
Start: 2020-06-19 | End: 2020-06-19

## 2020-06-19 RX ORDER — INSULIN LISPRO 100/ML
5 VIAL (ML) SUBCUTANEOUS
Refills: 0 | Status: DISCONTINUED | OUTPATIENT
Start: 2020-06-19 | End: 2020-06-19

## 2020-06-19 RX ORDER — POSACONAZOLE 100 MG/1
3 TABLET, DELAYED RELEASE ORAL
Qty: 0 | Refills: 0 | DISCHARGE
Start: 2020-06-19

## 2020-06-19 RX ORDER — VALGANCICLOVIR 450 MG/1
1 TABLET, FILM COATED ORAL
Qty: 0 | Refills: 0 | DISCHARGE
Start: 2020-06-19

## 2020-06-19 RX ORDER — INSULIN GLARGINE 100 [IU]/ML
14 INJECTION, SOLUTION SUBCUTANEOUS AT BEDTIME
Refills: 0 | Status: DISCONTINUED | OUTPATIENT
Start: 2020-06-19 | End: 2020-06-19

## 2020-06-19 RX ORDER — INSULIN LISPRO 100/ML
5 VIAL (ML) SUBCUTANEOUS
Qty: 1 | Refills: 0
Start: 2020-06-19 | End: 2020-07-18

## 2020-06-19 RX ORDER — ACETAMINOPHEN 500 MG
2 TABLET ORAL
Qty: 0 | Refills: 0 | DISCHARGE
Start: 2020-06-19

## 2020-06-19 RX ORDER — EVEROLIMUS 10 MG/1
1 TABLET ORAL
Qty: 0 | Refills: 0 | DISCHARGE
Start: 2020-06-19

## 2020-06-19 RX ORDER — FOLIC ACID 0.8 MG
1 TABLET ORAL
Qty: 0 | Refills: 0 | DISCHARGE
Start: 2020-06-19

## 2020-06-19 RX ORDER — CHOLECALCIFEROL (VITAMIN D3) 125 MCG
1000 CAPSULE ORAL
Qty: 0 | Refills: 0 | DISCHARGE
Start: 2020-06-19

## 2020-06-19 RX ORDER — SODIUM BICARBONATE 1 MEQ/ML
1 SYRINGE (ML) INTRAVENOUS
Qty: 0 | Refills: 0 | DISCHARGE
Start: 2020-06-19

## 2020-06-19 RX ORDER — PANTOPRAZOLE SODIUM 20 MG/1
1 TABLET, DELAYED RELEASE ORAL
Qty: 0 | Refills: 0 | DISCHARGE
Start: 2020-06-19

## 2020-06-19 RX ADMIN — EVEROLIMUS 0.75 MILLIGRAM(S): 10 TABLET ORAL at 07:54

## 2020-06-19 RX ADMIN — Medication 81 MILLIGRAM(S): at 11:57

## 2020-06-19 RX ADMIN — SODIUM CHLORIDE 3 MILLILITER(S): 9 INJECTION INTRAMUSCULAR; INTRAVENOUS; SUBCUTANEOUS at 14:02

## 2020-06-19 RX ADMIN — PANTOPRAZOLE SODIUM 40 MILLIGRAM(S): 20 TABLET, DELAYED RELEASE ORAL at 05:35

## 2020-06-19 RX ADMIN — MAGNESIUM OXIDE 400 MG ORAL TABLET 400 MILLIGRAM(S): 241.3 TABLET ORAL at 11:58

## 2020-06-19 RX ADMIN — Medication 1 MILLIGRAM(S): at 11:57

## 2020-06-19 RX ADMIN — Medication 1 TABLET(S): at 11:59

## 2020-06-19 RX ADMIN — Medication 650 MILLIGRAM(S): at 05:34

## 2020-06-19 RX ADMIN — Medication 1000 UNIT(S): at 12:00

## 2020-06-19 RX ADMIN — Medication 5 UNIT(S): at 07:53

## 2020-06-19 RX ADMIN — Medication 7 UNIT(S): at 11:56

## 2020-06-19 RX ADMIN — VALGANCICLOVIR 450 MILLIGRAM(S): 450 TABLET, FILM COATED ORAL at 11:59

## 2020-06-19 RX ADMIN — POSACONAZOLE 300 MILLIGRAM(S): 100 TABLET, DELAYED RELEASE ORAL at 11:58

## 2020-06-19 RX ADMIN — Medication 60 MILLIGRAM(S): at 07:54

## 2020-06-19 RX ADMIN — SODIUM CHLORIDE 3 MILLILITER(S): 9 INJECTION INTRAMUSCULAR; INTRAVENOUS; SUBCUTANEOUS at 05:30

## 2020-06-19 NOTE — PROGRESS NOTE ADULT - ASSESSMENT
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 Patient was treated w/ prednisone and Rituximab. Tacrolimus was discontinued and patient was also transitioned to Everolimus. Endocrinology consult requested for management of hyperglycemia in patient with NODAT.      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   Recommend decrease Lantus to 14 units qhs   Continue Humalog 5 units premeal  at breakfast; increase to 7 at lunch and dinner  Continue low premeal and qhs correctional scales   DM teaching, recommend reteach patient how to give himself insulin  Nutrition consult  On discharge, patient can continue Lantus 14units qhs and Humalog 5/7/7units premeal. When prednisone is titrated down, will need to titrate insulin.   Recommend contact Endocrine clinic if FS <70 or >200 to adjust insulin.   Patient can follow up in [facility]  [address] [place] [address] [phone] to help titrate dose of insulin    HTN   goal 130/80, above goal  Will defer management to primary team     HLD   goal LDL<70  consider lipid profile   Continue pravastatin

## 2020-06-19 NOTE — DISCHARGE NOTE PROVIDER - NSDCCPCAREPLAN_GEN_ALL_CORE_FT
PRINCIPAL DISCHARGE DIAGNOSIS  Diagnosis: Hyperglycemia  Assessment and Plan of Treatment: Followed by Endocrinologist Dr. Kael Vega - GREEN - bedtime 14 units injected  Humolog - YELLOW - before meals  5 units before breakfast injected   7 units  before lunch injected   7 units  before dinner injected   Check your sugar in the morning and  evening  record all numbers take with you to all doctor appointments  For hypoglycemia - low blood sugar please drink milk/orange juice  or eat cookies      SECONDARY DISCHARGE DIAGNOSES  Diagnosis: Presence of transplanted heart  Assessment and Plan of Treatment: Follow up with Dr Stahl on Monday June 22 at 8:30 am at [facility] heart failure clinic   Take all medications as prescribed   Renal resctriction diabetic diet   Call our office for fever chills or any concerns

## 2020-06-19 NOTE — PHARMACOTHERAPY INTERVENTION NOTE - COMMENTS
69 y/o heart transplant patient was admitted with hyperglycemia (the patient has been on prednisone 60 mG PO daily).     Inpatient medication reconciliation reviewed and will be continued appropriately. Reviewed discharge medications with the patient. Re-educated the patient on insulins - insulin glargine 14 units SQ at bedtime and insulin lispro SQ 5 units prior to breakfast and 7 units before lunch and dinner. The patient demonstrated the skills on how to draw and administer insulins. I asked him to show what insulin he needs to administer at bedtime (basal - green color sticker) and what insulin he needs to administer prior to meals (rapid acting - yellow sticker). Reinforced how to store insulins and signs and symptoms of hypoglycemia. The patient will be supervised by his brother who is diabetic and administers insulin daily first before he can transition to do it on his own. All questions and concerns were addressed.      Everolimus level is not back yet - will follow up. Plan discussed with multidisciplinary team.     Debbie Dowell, Pharm.D.  [phone]

## 2020-06-19 NOTE — DISCHARGE NOTE PROVIDER - CARE PROVIDER_API CALL
Edil Stahl  [facility]ART FAIL [facility]  [address] [place] [address]  Phone: [phone]  Fax: [phone]  Scheduled Appointment: 06/22/2020 08:30 AM    Hi Scruggs  ENDOCRINOLOGY/METAB/DIABETES  [address] [place] [address]  Phone: [phone]  Fax: [phone]  Follow Up Time: Routine

## 2020-06-19 NOTE — PROGRESS NOTE ADULT - PROBLEM SELECTOR PROBLEM 3
Hyperlipidemia, unspecified hyperlipidemia type
Hyperlipidemia, unspecified hyperlipidemia type
Heart transplant status

## 2020-06-19 NOTE — PROGRESS NOTE ADULT - SUBJECTIVE AND OBJECTIVE BOX
Chief Complaint: Evaluating this 71 y/o M for uncontrolled Type 2 DM w/ hyperglycemia      Interval History: Feeling well. Awaiting discharge. No current complaints. Had insulin teaching. Glucose values highest in the evening.    MEDICATIONS  (STANDING):  aspirin enteric coated 81 milliGRAM(s) Oral daily  cholecalciferol 1000 Unit(s) Oral daily  dextrose 5%. 1000 milliLiter(s) (50 mL/Hr) IV Continuous <Continuous>  dextrose 50% Injectable 12.5 Gram(s) IV Push once  dextrose 50% Injectable 25 Gram(s) IV Push once  dextrose 50% Injectable 25 Gram(s) IV Push once  everolimus (ZORTRESS) 0.75 milliGRAM(s) Oral <User Schedule>  folic acid 1 milliGRAM(s) Oral daily  insulin glargine Injectable (LANTUS) 14 Unit(s) SubCutaneous at bedtime  insulin lispro (HumaLOG) corrective regimen sliding scale   SubCutaneous three times a day before meals  insulin lispro (HumaLOG) corrective regimen sliding scale   SubCutaneous at bedtime  insulin lispro Injectable (HumaLOG) 7 Unit(s) SubCutaneous before lunch  insulin lispro Injectable (HumaLOG) 7 Unit(s) SubCutaneous with dinner  insulin lispro Injectable (HumaLOG) 5 Unit(s) SubCutaneous before breakfast  magnesium oxide 400 milliGRAM(s) Oral daily  pantoprazole    Tablet 40 milliGRAM(s) Oral before breakfast  posaconazole DR Tablet 300 milliGRAM(s) Oral daily  pravastatin 20 milliGRAM(s) Oral at bedtime  predniSONE   Tablet 60 milliGRAM(s) Oral <User Schedule>  sodium bicarbonate 650 milliGRAM(s) Oral two times a day  sodium chloride 0.9% lock flush 3 milliLiter(s) IV Push every 8 hours  trimethoprim   80 mG/sulfamethoxazole 400 mG 1 Tablet(s) Oral daily  valGANciclovir 450 milliGRAM(s) Oral daily    MEDICATIONS  (PRN):  acetaminophen   Tablet .. 650 milliGRAM(s) Oral every 6 hours PRN Mild Pain (1 - 3)  dextrose 40% Gel 15 Gram(s) Oral once PRN Blood Glucose LESS THAN 70 milliGRAM(s)/deciLiter  glucagon  Injectable 1 milliGRAM(s) IntraMuscular once PRN Glucose <70 milliGRAM(s)/deciLiter      Allergies    No Known Allergies    Intolerances      Review of Systems:  Constitutional: No fever  Eyes: No blurry vision  Cardiovascular: No chest pain  Respiratory: No SOB  GI: No abdominal pain, No nausea, No vomiting  Endocrine: as noted in HPI    All other negative      PHYSICAL EXAM:  VITALS: T(C): 36.6 (06-19-20 @ 04:57)  T(F): 97.9 (06-19-20 @ 04:57), Max: 97.9 (06-19-20 @ 04:57)  HR: 104 (06-19-20 @ 04:57) (104 - 104)  BP: 133/91 (06-19-20 @ 04:57) (133/91 - 133/91)  RR:  (18 - 18)  SpO2:  (99% - 99%)  Wt(kg): --  GENERAL: NAD at this time  EYES: EOMI, No proptosis  HEENT:  Atraumatic, Normocephalic,   RESPIRATORY: Clear to auscultation bilaterally, full excursion, non labored  CARDIOVASCULAR: Regular rhythm; normal S1/S2, no peripheral edema  GI: Soft, nontender, non distended, normal bowel sounds  SKIN: Warm and dry  PSYCH: normal affect, normal mood      POCT Blood Glucose.: 140 mg/dL (06-19-20 @ 11:45)  POCT Blood Glucose.: 101 mg/dL (06-19-20 @ 07:23)  POCT Blood Glucose.: 247 mg/dL (06-18-20 @ 21:30)  POCT Blood Glucose.: 288 mg/dL (06-18-20 @ 16:53)  POCT Blood Glucose.: 112 mg/dL (06-18-20 @ 11:46)  POCT Blood Glucose.: 96 mg/dL (06-18-20 @ 07:44)  POCT Blood Glucose.: 295 mg/dL (06-17-20 @ 21:48)  POCT Blood Glucose.: 257 mg/dL (06-17-20 @ 16:57)  POCT Blood Glucose.: 119 mg/dL (06-17-20 @ 12:14)  POCT Blood Glucose.: 165 mg/dL (06-17-20 @ 07:50)  POCT Blood Glucose.: 257 mg/dL (06-17-20 @ 04:47)  POCT Blood Glucose.: 136 mg/dL (06-17-20 @ 00:34)        06-19    138  |  104  |  29<H>  ----------------------------<  91  4.4   |  23  |  1.21    EGFR if : 70  EGFR if non : 60    Ca    9.0      06-19    TPro  6.0  /  Alb  3.9  /  TBili  1.1  /  DBili  x   /  AST  24  /  ALT  31  /  AlkPhos  79  06-19        Thyroid Function Tests:

## 2020-06-19 NOTE — DISCHARGE NOTE PROVIDER - CARE PROVIDERS DIRECT ADDRESSES
[email or web address]

## 2020-06-19 NOTE — PROGRESS NOTE ADULT - PROBLEM SELECTOR PROBLEM 2
Heart transplanted
Hypertension, unspecified type
Hypertension, unspecified type
Autoimmune hemolytic anemia
Heart transplanted

## 2020-06-19 NOTE — PROGRESS NOTE ADULT - SUBJECTIVE AND OBJECTIVE BOX
Subjective "  I want to go home"  VITAL SIGNS    Telemetry: SR 1st degree rate 100     Vital Signs Last 24 Hrs  T(C): 36.6 (20 @ 04:57), Max: 36.9 (20 @ 19:49)  T(F): 97.9 (20 @ 04:57), Max: 98.4 (20 @ 19:49)  HR: 104 (20 @ 04:57) (104 - 120)  BP: 133/91 (20 @ 04:57) (133/91 - 135/96)  RR: 18 (20 @ 04:57) (18 - 18)  SpO2: 99% (20 @ 04:57) (98% - 99%)            @ 07:01  -   @ 07:00  --------------------------------------------------------  IN: 920 mL / OUT: 1900 mL / NET: -980 mL    Daily Weight in k.8 (2020 08:55)                        9.5    7.44  )-----------( 122      ( 2020 07:38 )             28.6       138  |  104  |  29<H>  ----------------------------<  91  4.4   |  23  |  1.21    Ca    9.0      2020 07:38    TPro  6.0  /  Alb  3.9  /  TBili  1.1  /  DBili  x   /  AST  24  /  ALT  31  /  AlkPhos  79        CAPILLARY BLOOD GLUCOSE      POCT Blood Glucose.: 101 mg/dL (2020 07:23)  POCT Blood Glucose.: 247 mg/dL (2020 21:30)  POCT Blood Glucose.: 288 mg/dL (2020 16:53)  POCT Blood Glucose.: 112 mg/dL (2020 11:46  MEDICATIONS  (STANDING):  aspirin enteric coated 81 milliGRAM(s) Oral daily  cholecalciferol 1000 Unit(s) Oral daily  dextrose 5%. 1000 milliLiter(s) (50 mL/Hr) IV Continuous <Continuous>  dextrose 50% Injectable 12.5 Gram(s) IV Push once  dextrose 50% Injectable 25 Gram(s) IV Push once  dextrose 50% Injectable 25 Gram(s) IV Push once  everolimus (ZORTRESS) 0.75 milliGRAM(s) Oral <User Schedule>  folic acid 1 milliGRAM(s) Oral daily  insulin glargine Injectable (LANTUS) 18 Unit(s) SubCutaneous at bedtime  insulin lispro (HumaLOG) corrective regimen sliding scale   SubCutaneous three times a day before meals  insulin lispro (HumaLOG) corrective regimen sliding scale   SubCutaneous at bedtime  insulin lispro Injectable (HumaLOG) 5 Unit(s) SubCutaneous three times a day with meals  magnesium oxide 400 milliGRAM(s) Oral daily  pantoprazole    Tablet 40 milliGRAM(s) Oral before breakfast  posaconazole DR Tablet 300 milliGRAM(s) Oral daily  pravastatin 20 milliGRAM(s) Oral at bedtime  predniSONE   Tablet 60 milliGRAM(s) Oral <User Schedule>  sodium bicarbonate 650 milliGRAM(s) Oral two times a day  sodium chloride 0.9% lock flush 3 milliLiter(s) IV Push every 8 hours  trimethoprim   80 mG/sulfamethoxazole 400 mG 1 Tablet(s) Oral daily  valGANciclovir 450 milliGRAM(s) Oral daily    MEDICATIONS  (PRN):  acetaminophen   Tablet .. 650 milliGRAM(s) Oral every 6 hours PRN Mild Pain (1 - 3)  dextrose 40% Gel 15 Gram(s) Oral once PRN Blood Glucose LESS THAN 70 milliGRAM(s)/deciLiter  glucagon  Injectable 1 milliGRAM(s) IntraMuscular once PRN Glucose <70 milliGRAM(s)/deciLiter      PHYSICAL EXAM  Neurology: alert and oriented x 3, nonfocal, no gross deficits    CV : S1 S2 RRR    Sternal Wound : Healed  Stable    Lungs: B/l CTA on room air    Abdomen: soft, nontender, nondistended, positive bowel sounds,      :    Voids           Extremities:   equal strength throughout  B/L LE warm well perfused + DP no calftenderness                                        Physical Therapy Rec:   Home  [ x ]   Home w/ PT  [  ]  Rehab  [  ]    Discussed with Cardiothoracic Team at AM rounds. Subjective "  I want to go home"  VITAL SIGNS    Telemetry: SR 1st degree rate 100     Vital Signs Last 24 Hrs  T(C): 36.6 (20 @ 04:57), Max: 36.9 (20 @ 19:49)  T(F): 97.9 (20 @ 04:57), Max: 98.4 (20 @ 19:49)  HR: 104 (20 @ 04:57) (104 - 120)  BP: 133/91 (20 @ 04:57) (133/91 - 135/96)  RR: 18 (20 @ 04:57) (18 - 18)  SpO2: 99% (20 @ 04:57) (98% - 99%)            @ 07:01  -   @ 07:00  --------------------------------------------------------  IN: 920 mL / OUT: 1900 mL / NET: -980 mL    Daily Weight in k.8 (2020 08:55)                        9.5    7.44  )-----------( 122      ( 2020 07:38 )             28.6       138  |  104  |  29<H>  ----------------------------<  91  4.4   |  23  |  1.21    Ca    9.0      2020 07:38    TPro  6.0  /  Alb  3.9  /  TBili  1.1  /  DBili  x   /  AST  24  /  ALT  31  /  AlkPhos  79        CAPILLARY BLOOD GLUCOSE      POCT Blood Glucose.: 101 mg/dL (2020 07:23)  POCT Blood Glucose.: 247 mg/dL (2020 21:30)  POCT Blood Glucose.: 288 mg/dL (2020 16:53)  POCT Blood Glucose.: 112 mg/dL (2020 11:46  MEDICATIONS  (STANDING):  MEDICATIONS  (STANDING):  aspirin enteric coated 81 milliGRAM(s) Oral daily  cholecalciferol 1000 Unit(s) Oral daily  dextrose 5%. 1000 milliLiter(s) (50 mL/Hr) IV Continuous <Continuous>  dextrose 50% Injectable 12.5 Gram(s) IV Push once  dextrose 50% Injectable 25 Gram(s) IV Push once  dextrose 50% Injectable 25 Gram(s) IV Push once  everolimus (ZORTRESS) 0.75 milliGRAM(s) Oral <User Schedule>  folic acid 1 milliGRAM(s) Oral daily  insulin glargine Injectable (LANTUS) 14 Unit(s) SubCutaneous at bedtime  insulin lispro (HumaLOG) corrective regimen sliding scale   SubCutaneous three times a day before meals  insulin lispro (HumaLOG) corrective regimen sliding scale   SubCutaneous at bedtime  insulin lispro Injectable (HumaLOG) 7 Unit(s) SubCutaneous before lunch  insulin lispro Injectable (HumaLOG) 7 Unit(s) SubCutaneous with dinner  insulin lispro Injectable (HumaLOG) 5 Unit(s) SubCutaneous before breakfast  magnesium oxide 400 milliGRAM(s) Oral daily  pantoprazole    Tablet 40 milliGRAM(s) Oral before breakfast  posaconazole DR Tablet 300 milliGRAM(s) Oral daily  pravastatin 20 milliGRAM(s) Oral at bedtime  predniSONE   Tablet 60 milliGRAM(s) Oral <User Schedule>  sodium bicarbonate 650 milliGRAM(s) Oral two times a day  sodium chloride 0.9% lock flush 3 milliLiter(s) IV Push every 8 hours  trimethoprim   80 mG/sulfamethoxazole 400 mG 1 Tablet(s) Oral daily  valGANciclovir 450 milliGRAM(s) Oral daily    MEDICATIONS  (PRN):  acetaminophen   Tablet .. 650 milliGRAM(s) Oral every 6 hours PRN Mild Pain (1 - 3)  dextrose 40% Gel 15 Gram(s) Oral once PRN Blood Glucose LESS THAN 70 milliGRAM(s)/deciLiter  glucagon  Injectable 1 milliGRAM(s) IntraMuscular once PRN Glucose <70 milliGRAM(s)/deciLiter        PHYSICAL EXAM  Neurology: alert and oriented x 3, nonfocal, no gross deficits    CV : S1 S2 RRR    Sternal Wound : Healed  Stable    Lungs: B/l CTA on room air    Abdomen: soft, nontender, nondistended, positive bowel sounds,      :    Voids           Extremities:   equal strength throughout  B/L LE warm well perfused + DP no calftenderness                                        Physical Therapy Rec:   Home  [ x ]   Home w/ PT  [  ]  Rehab  [  ]    Discussed with Cardiothoracic Team at AM rounds.

## 2020-06-19 NOTE — PROGRESS NOTE ADULT - ASSESSMENT
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 Patient was treated w/ prednisone and Rituximab. Tacro was discontinued and patient was also transitioned to everolimus. Patient now presents with hyperglycemia blood sugar 400-500s. Endocrinology consulted and patient was admitted to [facility] telemetry.       6/17 VSS, BS trending down to 165 this AM and 140 on BMP. Pending endocrine recs.  6/18 VSS: Patient education on insulin administration was reinforced. Endo consulted and recommends discharge on 15 Lantus and 5 Humalog TID. Discharge plan for tomorrow.   6/199 VSS discharged cancelled due to elevated glucose likely weekend discharge.

## 2020-06-19 NOTE — DISCHARGE NOTE NURSING/CASE MANAGEMENT/SOCIAL WORK - PATIENT PORTAL LINK FT
You can access the FollowMyHealth Patient Portal offered by [facility] by registering at the following website: [email or web address]. By joining [facility]’s FollowMyHealth portal, you will also be able to view your health information using other applications (apps) compatible with our system.

## 2020-06-19 NOTE — DISCHARGE NOTE PROVIDER - NSDCMRMEDTOKEN_GEN_ALL_CORE_FT
acetaminophen 325 mg oral tablet: 2 tab(s) orally every 6 hours, As needed, Mild Pain (1 - 3)  alcohol swabs : Apply topically to affected area 4 times a day   aspirin 81 mg oral delayed release tablet: 1 tab(s) orally once a day  cholecalciferol oral tablet: 1000 unit(s) orally once a day  everolimus 0.75 mg oral tablet: 1 tab(s) orally   folic acid 1 mg oral tablet: 1 tab(s) orally once a day  glucometer (per patient&#x27;s insurance): 1 application subcutaneous 4 times a day   HumaLOG KwikPen 100 units/mL injectable solution: 5 unit(s) subcutaneous before breakfast  7 units subcutaneous before lunch  7 units subcutaneous before dinner  Insulin Pen Needles, 4mm: 1 application subcutaneously 4 times a day. ** Use with insulin pen **   Lantus Solostar Pen 100 units/mL subcutaneous solution: 14 unit(s) subcutaneous once a day (at bedtime)  magnesium oxide 400 mg (241.3 mg elemental magnesium) oral tablet: 1 tab(s) orally once a day  pantoprazole 40 mg oral delayed release tablet: 1 tab(s) orally once a day (before a meal)  posaconazole 100 mg oral delayed release tablet: 3 tab(s) orally once a day  pravastatin 20 mg oral tablet: 1 tab(s) orally once a day (at bedtime)  predniSONE 20 mg oral tablet: 3 tab(s) orally   sodium bicarbonate 650 mg oral tablet: 1 tab(s) orally 2 times a day  sulfamethoxazole-trimethoprim 400 mg-80 mg oral tablet: 1 tab(s) orally once a day  test strips (per patient&#x27;s insurance): 1 application subcutaneously 2 times a day . ** Compatible with patient&#x27;s glucometer **   valGANciclovir 450 mg oral tablet: 1 tab(s) orally once a day

## 2020-06-19 NOTE — DISCHARGE NOTE PROVIDER - HOSPITAL COURSE
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 Patient was treated w/ prednisone and Rituximab. Tacro was discontinued and patient was also transitioned to everolimus. Patient now presents with hyperglycemia blood sugar 400-500s. Endocrinology consulted and patient was admitted to [facility] telemetry.             6/17 VSS, BS trending down to 165 this AM and 140 on BMP. Pending endocrine recs.    6/18 VSS: Patient education on insulin administration was reinforced. Endo consulted and recommends discharge on 15 Lantus and 5 Humalog TID. Discharge plan for tomorrow.     6/199 VSS  insulin regimen increased for elevated glucose discharge  today per Dr Stahl.

## 2020-06-20 LAB
EVEROLIMUS, WHOLE BLOOD RESULT: 8 NG/ML — SIGNIFICANT CHANGE UP (ref 3–8)
EVEROLIMUS, WHOLE BLOOD RESULT: 8.5 NG/ML — HIGH (ref 3–8)
GALACTOMANNAN AG SERPL-ACNC: <0.5 INDEX — SIGNIFICANT CHANGE UP

## 2020-06-22 LAB
CULTURE RESULTS: SIGNIFICANT CHANGE UP
CULTURE RESULTS: SIGNIFICANT CHANGE UP
SPECIMEN SOURCE: SIGNIFICANT CHANGE UP
SPECIMEN SOURCE: SIGNIFICANT CHANGE UP

## 2020-06-22 RX ORDER — SODIUM ZIRCONIUM CYCLOSILICATE 10 G/10G
10 POWDER, FOR SUSPENSION ORAL DAILY
Qty: 30 | Refills: 5 | Status: DISCONTINUED | COMMUNITY
Start: 2020-05-29 | End: 2020-06-22

## 2020-06-22 NOTE — PATIENT PROFILE ADULT. - FUNCTIONAL SCREEN CURRENT LEVEL: TRANSFERRING, MLM
Quality 110: Preventive Care And Screening: Influenza Immunization: Influenza Immunization Administered during Influenza season Quality 226: Preventive Care And Screening: Tobacco Use: Screening And Cessation Intervention: Patient screened for tobacco use and is an ex/non-smoker Detail Level: Detailed Quality 131: Pain Assessment And Follow-Up: Pain assessment using a standardized tool is documented as negative, no follow-up plan required Quality 130: Documentation Of Current Medications In The Medical Record: Current Medications Documented (0) independent

## 2020-06-23 LAB — EVEROLIMUS, WHOLE BLOOD RESULT: 8.3 NG/ML — HIGH (ref 3–8)

## 2020-06-24 ENCOUNTER — APPOINTMENT (OUTPATIENT)
Dept: HEART FAILURE | Facility: CLINIC | Age: 70
End: 2020-06-24
Payer: MEDICARE

## 2020-06-24 ENCOUNTER — LABORATORY RESULT (OUTPATIENT)
Age: 70
End: 2020-06-24

## 2020-06-24 ENCOUNTER — NON-APPOINTMENT (OUTPATIENT)
Age: 70
End: 2020-06-24

## 2020-06-24 VITALS
OXYGEN SATURATION: 96 % | TEMPERATURE: 97.7 F | HEART RATE: 118 BPM | WEIGHT: 168 LBS | DIASTOLIC BLOOD PRESSURE: 93 MMHG | BODY MASS INDEX: 25.76 KG/M2 | SYSTOLIC BLOOD PRESSURE: 149 MMHG | RESPIRATION RATE: 12 BRPM | HEIGHT: 67.9 IN

## 2020-06-24 DIAGNOSIS — B49 UNSPECIFIED MYCOSIS: ICD-10-CM

## 2020-06-24 PROBLEM — Z86.2 PERSONAL HISTORY OF DISEASES OF THE BLOOD AND BLOOD-FORMING ORGANS AND CERTAIN DISORDERS INVOLVING THE IMMUNE MECHANISM: Chronic | Status: ACTIVE | Noted: 2020-06-16

## 2020-06-24 LAB
ALBUMIN SERPL ELPH-MCNC: 3.9 G/DL
ALP BLD-CCNC: 75 U/L
ALT SERPL-CCNC: 31 U/L
ANION GAP SERPL CALC-SCNC: 12 MMOL/L
AST SERPL-CCNC: 30 U/L
BASOPHILS # BLD AUTO: 0 K/UL
BASOPHILS NFR BLD AUTO: 0 %
BILIRUB SERPL-MCNC: 1 MG/DL
BUN SERPL-MCNC: 28 MG/DL
CALCIUM SERPL-MCNC: 8.5 MG/DL
CHLORIDE SERPL-SCNC: 107 MMOL/L
CO2 SERPL-SCNC: 22 MMOL/L
CREAT SERPL-MCNC: 1.46 MG/DL
EOSINOPHIL # BLD AUTO: 0 K/UL
EOSINOPHIL NFR BLD AUTO: 0 %
GLUCOSE SERPL-MCNC: 90 MG/DL
HCT VFR BLD CALC: 23.1 %
HGB BLD-MCNC: 7.6 G/DL
LYMPHOCYTES # BLD AUTO: 0.14 K/UL
LYMPHOCYTES NFR BLD AUTO: 2.7 %
MAGNESIUM SERPL-MCNC: 2 MG/DL
MAN DIFF?: NORMAL
MCHC RBC-ENTMCNC: 30.8 PG
MCHC RBC-ENTMCNC: 32.9 GM/DL
MCV RBC AUTO: 93.5 FL
MONOCYTES # BLD AUTO: 0.22 K/UL
MONOCYTES NFR BLD AUTO: 4.4 %
NEUTROPHILS # BLD AUTO: 4.66 K/UL
NEUTROPHILS NFR BLD AUTO: 92 %
PLATELET # BLD AUTO: 131 K/UL
POTASSIUM SERPL-SCNC: 3.8 MMOL/L
PROT SERPL-MCNC: 5.5 G/DL
RBC # BLD: 2.47 M/UL
RBC # FLD: 17 %
SODIUM SERPL-SCNC: 141 MMOL/L
WBC # FLD AUTO: 5.07 K/UL

## 2020-06-24 PROCEDURE — 99214 OFFICE O/P EST MOD 30 MIN: CPT

## 2020-06-24 NOTE — REASON FOR VISIT
[Follow-Up - From Hospitalization] : follow-up of a recent hospitalization for [Heart Transplant] : heart transplant

## 2020-06-24 NOTE — ASSESSMENT
[FreeTextEntry1] : Mr. Baez is a 70 year old man with history of NICM, s/p heart transplantation on 2/23/18. 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 Early post transplant he received therapy with IVIG, plasmapheresis, and rituximab with course complicated by development of Nocardia pulmonary infection, which is resolved. He more recently developed autoimmune hemolytic anemia, possibly due to tacrolimus.

## 2020-06-24 NOTE — PHYSICAL EXAM
[General Appearance - Well Developed] : well developed [General Appearance - Well Nourished] : well nourished [Normal Conjunctiva] : the conjunctiva exhibited no abnormalities [Normal Oral Mucosa] : normal oral mucosa [Normal Oropharynx] : normal oropharynx [Respiration, Rhythm And Depth] : normal respiratory rhythm and effort [Auscultation Breath Sounds / Voice Sounds] : lungs were clear to auscultation bilaterally [Heart Rate And Rhythm] : heart rate and rhythm were normal [Heart Sounds] : normal S1 and S2 [Arterial Pulses Normal] : the arterial pulses were normal [Bowel Sounds] : normal bowel sounds [Edema] : no peripheral edema present [Abdomen Soft] : soft [Abdomen Tenderness] : non-tender [Abnormal Walk] : normal gait [Cyanosis, Localized] : no localized cyanosis [Nail Clubbing] : no clubbing of the fingernails [Skin Color & Pigmentation] : normal skin color and pigmentation [Skin Turgor] : normal skin turgor [] : no rash [No Anxiety] : not feeling anxious [Oriented To Time, Place, And Person] : oriented to person, place, and time [Impaired Insight] : insight and judgment were intact [FreeTextEntry1] : Normal JVP without HJR.

## 2020-06-24 NOTE — DISCUSSION/SUMMARY
[___ Month(s)] : [unfilled] month(s) [FreeTextEntry1] : - Immunosuppression/Autoimmune hemolytic anemia: Continue everolimus with goal level 6-8. He remains off of CellCept. As the prednisone is weaned for treatment of hemolytic anemia, we will resume Cellcept. He will follow-up with heme. \par - added on LDH and haptoglobin levels to today's labs, will follow up and inform Hematology of results.\par - S/P Heart transplantation: He has no evidence of clinically significant graft dysfunction or congestive heart failure on exam. 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 He has no significant CAV. \par - History of stage III CKD with RTA: Stable. \par - Antibiotic prophylaxis:CMV (intermediate risk): Continue valganciclovir. Continue Bactrim. \par - CAV prophylaxis/hyperlipidemia: Continue ASA 81mg daily and pravachol 20 mg daily. Check lipid panel today.\par - Hyperglycemia: continue basaglar and lispo insulin. Pt needs f/u endocrine appt scheduled.\par - Elevated fungitell: continue posaconazole 300mg daily per ID Dr. Lujan\par - Ulcer prophylaxis: Continue famotidine 20mg daily.\par - Osteoporosis prophylaxis: Continue calcium + vit D 2 tabs BID.\par - Hypomagnesemia: Continue magnesium oxide 1200 mg daily.\par - Elevated PSA: Stable.\par - Chronic right knee pain, osteoarthritis: Continue to monitor.  \par \par Plan of care reviewed with Dr. Stahl.

## 2020-06-24 NOTE — HISTORY OF PRESENT ILLNESS
[FreeTextEntry1] : Mr. Baez is a 70 year old man with past medical history of a nonischemic cardiomyopathy and chronic systolic heart failure s/p HM2 LVAD in June 2017 complicated by recurrent GI hemorrhage and possible pump thrombosis who underwent heart transplant on 2/23/18 with a hepatitis C positive donor. His course was complicated by bilateral IJ/subclavian thrombi, a persistent small right sided pleural effusion, as well as acute on chronic renal failure of unclear etiology. In addition, his post-operative course was complicated by graft dysfunction of unclear etiology and persistent C4d positive staining on endomyocardial biopsy with negative DSAs. He developed joshua evidence of graft dysfunction leading to treatment with plasmapheresis, IVIG, and rituximab. Subsequently in June of 2018 he was found to have an pneumonia, that was ultimately diagnosed as Nocardia. This was successfully treated with therapy completed in August.\par \par More recently, he was admitted with hemolytic anemia of unclear etiology. There were no clear precipitating factors, such as infection. He was treated with prednisone and Rituximab. Given the potential for CNI inhibitors leading to hemolytic anemia, pt was transitioned to everolimus.  Pt was then re-admitted 6/15-6/19 for hyperglycemia and started on insulin short acting and lantus. \par \par Since being home he reports feeling great. C/o abdominal tightness sometimes and intermittent finger tingling. Denied any further pressure in his chest. He reports he walks 30 minutes daily using cane and without shortness of breath, but has ongoing limitations due to chronic right knee pain, which has not changed.  He has no PND or orthopnea and no cough. He denies fevers or chills. He has no abdominal pain, no dysuria, or swelling in the legs. He has no diarrhea or rashes. Reviewed home fingerstick logs: lowest 80mg/dL and highest 250mg/dL. Pt self administering insulin.

## 2020-06-25 LAB
CHOLEST SERPL-MCNC: 176 MG/DL
CHOLEST/HDLC SERPL: 2.7 RATIO
HAPTOGLOB SERPL-MCNC: 56 MG/DL
HDLC SERPL-MCNC: 64 MG/DL
LDH SERPL-CCNC: 586 U/L
LDLC SERPL CALC-MCNC: NORMAL MG/DL
TRIGL SERPL-MCNC: 474 MG/DL

## 2020-06-29 ENCOUNTER — RESULT REVIEW (OUTPATIENT)
Age: 70
End: 2020-06-29

## 2020-06-29 ENCOUNTER — OUTPATIENT (OUTPATIENT)
Dept: OUTPATIENT SERVICES | Facility: HOSPITAL | Age: 70
LOS: 1 days | End: 2020-06-29
Payer: MEDICARE

## 2020-06-29 ENCOUNTER — APPOINTMENT (OUTPATIENT)
Dept: HEMATOLOGY ONCOLOGY | Facility: CLINIC | Age: 70
End: 2020-06-29

## 2020-06-29 DIAGNOSIS — Z94.1 HEART TRANSPLANT STATUS: Chronic | ICD-10-CM

## 2020-06-29 DIAGNOSIS — Z94.1 HEART TRANSPLANT STATUS: ICD-10-CM

## 2020-06-29 DIAGNOSIS — Z98.890 OTHER SPECIFIED POSTPROCEDURAL STATES: Chronic | ICD-10-CM

## 2020-06-29 LAB
ALBUMIN SERPL ELPH-MCNC: 4.1 G/DL
ALP BLD-CCNC: 89 U/L
ALT SERPL-CCNC: 69 U/L
ANION GAP SERPL CALC-SCNC: 12 MMOL/L
ANTIBODY ID 1_1: SIGNIFICANT CHANGE UP
ANTIBODY ID 1_2: SIGNIFICANT CHANGE UP
AST SERPL-CCNC: 42 U/L
BASOPHILS # BLD AUTO: 0 K/UL — SIGNIFICANT CHANGE UP (ref 0–0.2)
BASOPHILS NFR BLD AUTO: 0 % — SIGNIFICANT CHANGE UP (ref 0–2)
BILIRUB SERPL-MCNC: 1 MG/DL
BLD GP AB SCN SERPL QL: POSITIVE — SIGNIFICANT CHANGE UP
BUN SERPL-MCNC: 27 MG/DL
CALCIUM SERPL-MCNC: 8.7 MG/DL
CHLORIDE SERPL-SCNC: 108 MMOL/L
CO2 SERPL-SCNC: 25 MMOL/L
CREAT SERPL-MCNC: 1.36 MG/DL
DAT C3-SP REAG RBC QL: NEGATIVE — SIGNIFICANT CHANGE UP
DAT POLY-SP REAG RBC QL: POSITIVE — SIGNIFICANT CHANGE UP
DIRECT COOMBS IGG: POSITIVE — SIGNIFICANT CHANGE UP
ELUATE ANTIBODY 1: SIGNIFICANT CHANGE UP
EOSINOPHIL # BLD AUTO: 0 K/UL — SIGNIFICANT CHANGE UP (ref 0–0.5)
EOSINOPHIL NFR BLD AUTO: 0 % — SIGNIFICANT CHANGE UP (ref 0–6)
GLUCOSE SERPL-MCNC: 122 MG/DL
HAPTOGLOB SERPL-MCNC: 84 MG/DL
HCT VFR BLD CALC: 24.5 % — LOW (ref 39–50)
HGB BLD-MCNC: 7.8 G/DL — LOW (ref 13–17)
LDH SERPL-CCNC: 466 U/L
LYMPHOCYTES # BLD AUTO: 0.22 K/UL — LOW (ref 1–3.3)
LYMPHOCYTES # BLD AUTO: 4 % — LOW (ref 13–44)
MCHC RBC-ENTMCNC: 30.8 PG — SIGNIFICANT CHANGE UP (ref 27–34)
MCHC RBC-ENTMCNC: 31.8 GM/DL — LOW (ref 32–36)
MCV RBC AUTO: 96.8 FL — SIGNIFICANT CHANGE UP (ref 80–100)
METAMYELOCYTES # FLD: 3 % — HIGH (ref 0–0)
MONOCYTES # BLD AUTO: 0 K/UL — SIGNIFICANT CHANGE UP (ref 0–0.9)
MONOCYTES NFR BLD AUTO: 0 % — LOW (ref 2–14)
MYELOCYTES NFR BLD: 2 % — HIGH (ref 0–0)
NEUTROPHILS # BLD AUTO: 5.11 K/UL — SIGNIFICANT CHANGE UP (ref 1.8–7.4)
NEUTROPHILS NFR BLD AUTO: 89 % — HIGH (ref 43–77)
NEUTS BAND # BLD: 2 % — SIGNIFICANT CHANGE UP (ref 0–8)
NRBC # BLD: 1 /100 — HIGH (ref 0–0)
NRBC # BLD: SIGNIFICANT CHANGE UP /100 WBCS (ref 0–0)
PLAT MORPH BLD: NORMAL — SIGNIFICANT CHANGE UP
PLATELET # BLD AUTO: 135 K/UL — LOW (ref 150–400)
POTASSIUM SERPL-SCNC: 3.8 MMOL/L
PROT SERPL-MCNC: 5.9 G/DL
RBC # BLD: 2.53 M/UL — LOW (ref 4.2–5.8)
RBC # FLD: 18 % — HIGH (ref 10.3–14.5)
RBC BLD AUTO: SIGNIFICANT CHANGE UP
RETICS #: 92.1 K/UL — SIGNIFICANT CHANGE UP (ref 25–125)
RETICS/RBC NFR: 3.6 % — HIGH (ref 0.5–2.5)
RH IG SCN BLD-IMP: POSITIVE — SIGNIFICANT CHANGE UP
SODIUM SERPL-SCNC: 145 MMOL/L
WBC # BLD: 5.61 K/UL — SIGNIFICANT CHANGE UP (ref 3.8–10.5)
WBC # FLD AUTO: 5.61 K/UL — SIGNIFICANT CHANGE UP (ref 3.8–10.5)

## 2020-06-29 PROCEDURE — 86077 PHYS BLOOD BANK SERV XMATCH: CPT

## 2020-06-30 ENCOUNTER — APPOINTMENT (OUTPATIENT)
Dept: INFUSION THERAPY | Facility: HOSPITAL | Age: 70
End: 2020-06-30

## 2020-06-30 LAB — EVEROLIMUS BLD-MCNC: 10.8 NG/ML

## 2020-07-01 DIAGNOSIS — Z51.89 ENCOUNTER FOR OTHER SPECIFIED AFTERCARE: ICD-10-CM

## 2020-07-01 PROCEDURE — 86850 RBC ANTIBODY SCREEN: CPT

## 2020-07-01 PROCEDURE — 86860 RBC ANTIBODY ELUTION: CPT

## 2020-07-01 PROCEDURE — 86900 BLOOD TYPING SEROLOGIC ABO: CPT

## 2020-07-01 PROCEDURE — 86880 COOMBS TEST DIRECT: CPT

## 2020-07-01 PROCEDURE — 86901 BLOOD TYPING SEROLOGIC RH(D): CPT

## 2020-07-01 PROCEDURE — 86870 RBC ANTIBODY IDENTIFICATION: CPT

## 2020-07-01 PROCEDURE — 86922 COMPATIBILITY TEST ANTIGLOB: CPT

## 2020-07-08 NOTE — PATIENT PROFILE ADULT. - AS SC BRADEN NUTRITION
Name: Wei Potts ADMIT: 2020   : 1957  PCP: Bella Villalta MD    MRN: [id number] LOS: 2 days   AGE/SEX: 63 y.o. male  ROOM: [facility]     Subjective   Subjective   Belly feels the same as yesterday.  Leg swelling is less.  Does not feel like he can take a deep breath.  Nonproductive cough which is unchanged.  He attributes that to allergies    Review of Systems     Objective   Objective   Vital Signs  Temp:  [97 °F (36.1 °C)-98.6 °F (37 °C)] 98.6 °F (37 °C)  Heart Rate:  [66-85] 66  Resp:  [16-18] 18  BP: ()/(47-65) 93/47  SpO2:  [91 %-94 %] 93 %  on   ;   Device (Oxygen Therapy): room air  Body mass index is 24.24 kg/m².  Physical Exam   Constitutional: He appears well-developed and well-nourished. No distress.   HENT:   Head: Normocephalic and atraumatic.   Eyes: Pupils are equal, round, and reactive to light. Scleral icterus is present.   Neck: Neck supple. No JVD present.   Cardiovascular: Normal rate and regular rhythm.   No murmur heard.  Pulmonary/Chest: No stridor. No respiratory distress. He has no wheezes.   Breath sounds diminished with crackles left base   Abdominal: Soft. Bowel sounds are normal. He exhibits distension. There is no tenderness.   Positive ascites and fluid wave.  I am not able to palpate a spleen tip.  No hepatomegaly   Musculoskeletal: He exhibits edema.   Trace   Neurological: He is alert.   Skin: Skin is warm and dry. No rash noted. He is not diaphoretic.   Nursing note and vitals reviewed.      Results Review:       I reviewed the patient's new clinical results.  Results from last 7 days   Lab Units 20  1630   WBC 10*3/mm3 4.47 4.22 4.25   HEMOGLOBIN g/dL 9.0* 8.8* 9.7*   PLATELETS 10*3/mm3 86* 75* 88*     Results from last 7 days   Lab Units 20  1630   SODIUM mmol/L 136  --  134* 133*   POTASSIUM mmol/L 3.6 3.6 3.2* 3.5   CHLORIDE mmol/L 107  --  105 103   CO2 mmol/L  24.6  --  23.3 22.6   BUN mg/dL 8  --  7* 8   CREATININE mg/dL 0.57*  --  0.50* 0.64*   GLUCOSE mg/dL 87  --  89 102*   Estimated Creatinine Clearance: 147.8 mL/min (A) (by C-G formula based on SCr of 0.57 mg/dL (L)).  Results from last 7 days   Lab Units 07/08/20  0733 07/06/20  1630   ALBUMIN g/dL 2.00* 2.60*   BILIRUBIN mg/dL 4.5* 6.0*   ALK PHOS U/L 117 145*   AST (SGOT) U/L 130* 169*   ALT (SGPT) U/L 38 43*     Results from last 7 days   Lab Units 07/08/20  0733 07/07/20 0633 07/06/20  1630   CALCIUM mg/dL 7.6* 7.7* 8.2*   ALBUMIN g/dL 2.00*  --  2.60*       No results found for: HGBA1C, POCGLU      folic acid 1 mg Oral Daily   metoprolol tartrate 25 mg Oral Q12H   multivitamin 1 tablet Oral Daily   multivitamin 1 tablet Oral Daily   pantoprazole 40 mg Oral Q AM   phytonadione 10 mg Oral Once   sodium chloride 10 mL Intravenous Q12H   thiamine 100 mg Oral Daily      Diet Regular; Low Sodium; 1,500 mg Na       Assessment/Plan     Active Hospital Problems    Diagnosis  POA   • **Alcoholic cirrhosis of liver with ascites (CMS/HCC) [K70.31]  Unknown   • Coagulopathy (CMS/HCC) [D68.9]  Unknown   • Anemia [D64.9]  Unknown   • Jaundice [R17]  Unknown      Resolved Hospital Problems    Diagnosis Date Resolved POA   • Hyponatremia [E87.1] 07/08/2020 Unknown   • Benign essential hypertension [I10] 07/08/2020 Yes       · Cirrhosis with ascites, probably due to alcohol.  Other studies pending.  Coagulopathy present.  Unable to give FFP because of Jehovah witness status.  He got 5 mg of vitamin K yesterday.  Will give 10 mg today (oral); hopefully can have paracentesis tomorrow  · Anemia with low iron level.  EGD prior to discharge per GI.  Thrombocytopenia noted and secondary to above.  · Crackles left base.  Atelectasis and pleural effusions noted on admission CT abdomen.  Will check chest x-ray.  Start incentive spirometry.  Echocardiogram ordered.  Procalcitonin added to labs but clinically does not appear to be  pneumonia  · Blood pressures are low normal as expected with cirrhosis      Karie Anderson MD  [facility]  07/08/20  09:48             (3) adequate

## 2020-07-09 ENCOUNTER — OUTPATIENT (OUTPATIENT)
Dept: OUTPATIENT SERVICES | Facility: HOSPITAL | Age: 70
LOS: 1 days | Discharge: HOME | End: 2020-07-09

## 2020-07-09 DIAGNOSIS — Z98.890 OTHER SPECIFIED POSTPROCEDURAL STATES: Chronic | ICD-10-CM

## 2020-07-09 DIAGNOSIS — Z94.1 HEART TRANSPLANT STATUS: Chronic | ICD-10-CM

## 2020-07-09 DIAGNOSIS — D64.9 ANEMIA, UNSPECIFIED: ICD-10-CM

## 2020-07-13 ENCOUNTER — RESULT REVIEW (OUTPATIENT)
Age: 70
End: 2020-07-13

## 2020-07-13 ENCOUNTER — APPOINTMENT (OUTPATIENT)
Dept: HEMATOLOGY ONCOLOGY | Facility: CLINIC | Age: 70
End: 2020-07-13
Payer: MEDICARE

## 2020-07-13 ENCOUNTER — LABORATORY RESULT (OUTPATIENT)
Age: 70
End: 2020-07-13

## 2020-07-13 VITALS
BODY MASS INDEX: 26.56 KG/M2 | HEART RATE: 110 BPM | DIASTOLIC BLOOD PRESSURE: 91 MMHG | RESPIRATION RATE: 16 BRPM | SYSTOLIC BLOOD PRESSURE: 146 MMHG | OXYGEN SATURATION: 95 % | TEMPERATURE: 98.3 F | WEIGHT: 174.17 LBS

## 2020-07-13 LAB
ALBUMIN SERPL ELPH-MCNC: 4.1 G/DL
ALP BLD-CCNC: 69 U/L
ALT SERPL-CCNC: 40 U/L
ANION GAP SERPL CALC-SCNC: 13 MMOL/L
AST SERPL-CCNC: 37 U/L
BASOPHILS # BLD AUTO: 0 K/UL — SIGNIFICANT CHANGE UP (ref 0–0.2)
BASOPHILS NFR BLD AUTO: 0 % — SIGNIFICANT CHANGE UP (ref 0–2)
BILIRUB SERPL-MCNC: 0.8 MG/DL
BUN SERPL-MCNC: 20 MG/DL
CALCIUM SERPL-MCNC: 8.9 MG/DL
CHLORIDE SERPL-SCNC: 106 MMOL/L
CO2 SERPL-SCNC: 25 MMOL/L
CREAT SERPL-MCNC: 1.27 MG/DL
EOSINOPHIL # BLD AUTO: 0 K/UL — SIGNIFICANT CHANGE UP (ref 0–0.5)
EOSINOPHIL NFR BLD AUTO: 0 % — SIGNIFICANT CHANGE UP (ref 0–6)
GLUCOSE SERPL-MCNC: 140 MG/DL
HAPTOGLOB SERPL-MCNC: 71 MG/DL
HCT VFR BLD CALC: 29 % — LOW (ref 39–50)
HGB BLD-MCNC: 8.9 G/DL — LOW (ref 13–17)
LDH SERPL-CCNC: 521 U/L
LYMPHOCYTES # BLD AUTO: 0.19 K/UL — LOW (ref 1–3.3)
LYMPHOCYTES # BLD AUTO: 4 % — LOW (ref 13–44)
MCHC RBC-ENTMCNC: 30.7 GM/DL — LOW (ref 32–36)
MCHC RBC-ENTMCNC: 31.3 PG — SIGNIFICANT CHANGE UP (ref 27–34)
MCV RBC AUTO: 102.1 FL — HIGH (ref 80–100)
MONOCYTES # BLD AUTO: 0.29 K/UL — SIGNIFICANT CHANGE UP (ref 0–0.9)
MONOCYTES NFR BLD AUTO: 6 % — SIGNIFICANT CHANGE UP (ref 2–14)
NEUTROPHILS # BLD AUTO: 4.36 K/UL — SIGNIFICANT CHANGE UP (ref 1.8–7.4)
NEUTROPHILS NFR BLD AUTO: 89 % — HIGH (ref 43–77)
NEUTS BAND # BLD: 1 % — SIGNIFICANT CHANGE UP (ref 0–8)
NRBC # BLD: 1 /100 — HIGH (ref 0–0)
NRBC # BLD: SIGNIFICANT CHANGE UP /100 WBCS (ref 0–0)
PLAT MORPH BLD: NORMAL — SIGNIFICANT CHANGE UP
PLATELET # BLD AUTO: 161 K/UL — SIGNIFICANT CHANGE UP (ref 150–400)
POTASSIUM SERPL-SCNC: 4.1 MMOL/L
PROT SERPL-MCNC: 5.7 G/DL
RBC # BLD: 2.84 M/UL — LOW (ref 4.2–5.8)
RBC # FLD: 20.5 % — HIGH (ref 10.3–14.5)
RBC BLD AUTO: SIGNIFICANT CHANGE UP
RETICS #: 104.5 K/UL — SIGNIFICANT CHANGE UP (ref 25–125)
RETICS/RBC NFR: 3.7 % — HIGH (ref 0.5–2.5)
SODIUM SERPL-SCNC: 144 MMOL/L
WBC # BLD: 4.84 K/UL — SIGNIFICANT CHANGE UP (ref 3.8–10.5)
WBC # FLD AUTO: 4.84 K/UL — SIGNIFICANT CHANGE UP (ref 3.8–10.5)

## 2020-07-13 PROCEDURE — 99213 OFFICE O/P EST LOW 20 MIN: CPT

## 2020-07-13 NOTE — HISTORY OF PRESENT ILLNESS
[de-identified] : Mr. Baez was referred to my office after recent admission to [facility] hospital during which he was diagnosed with autoimmune hemolytic anemia. He has a history of non-ischemic cardiomyopathy s/p cardiac transplant on 2/23/18 with coronary fistula, Hep C+ (treated), hx of prior b/l subclavian DVT s/p Rx, had acute antibody mediated rejection of transplant in 2018 and was treated successfully withp IVIG/plasmapharesis/Rituximab, and CKD (b/l Cr 1.4). He presented to hospital on 4/29/20 with pain across from chest for past day which was increasingly severe along with abdominal tightness.. Due to concern of coronavirus infection, he was instructed by his transplant coordinator to come to ER which he did via EMS. On arrival, VSS, afebrile. COVID19 PCR negative on 4/29/20 and 4/30/20. Admission Hb 8.1g/dl, repeat 7.1g/dl on 4/30/20, with elevated total bilirubin of 2.6. A Ct A/P was done on 4/30/20 to eval for cholelithiasis -no acute cholecystitis. A Direct Jacky test done on 5/1/20 showed a strong warm antibody and a cold antibody present. He was started on Prednisone 1mg/kg=75mg/day on 5/1/20. He was also given 1U PRBC on 5/2/20 and 1U PRBC on 5/6/20. He was also treated with Lokelma for hyperkalemia. GI recommending UGIS and outpt EGD for further evaluation of questionable gastric mural thickening. He was discharged home on 5/7/20 -on discharge, Hb 9.7g/dl.   [de-identified] : The patient is here for Critical access hospital follow up. He feels well, has no chest pains/SOB/LOBATO. No fevers. He feels well; got 2U PRBC after Hb dropped to 7.8 on 6/29/20. He denied any bleeding/bruising.\par \par PATIENT"s TRANSPLANT COORDINATOR: Amairani  [phone]

## 2020-07-13 NOTE — REVIEW OF SYSTEMS
[Joint Pain] : joint pain [Negative] : Allergic/Immunologic [Fever] : no fever [Night Sweats] : no night sweats [Fatigue] : no fatigue [Recent Change In Weight] : ~T no recent weight change [Eye Pain] : no eye pain [Red Eyes] : eyes not red [Dry Eyes] : no dryness of the eyes [Vision Problems] : no vision problems [Dysphagia] : no dysphagia [Loss of Hearing] : no loss of hearing [Nosebleeds] : no nosebleeds [Hoarseness] : no hoarseness [Odynophagia] : no odynophagia [Mucosal Pain] : no mucosal pain [Chest Pain] : no chest pain [Palpitations] : no palpitations [Leg Claudication] : no intermittent leg claudication [Lower Ext Edema] : no lower extremity edema [Shortness Of Breath] : no shortness of breath [Wheezing] : no wheezing [Cough] : no cough [SOB on Exertion] : no shortness of breath during exertion [Abdominal Pain] : no abdominal pain [Vomiting] : no vomiting [Constipation] : no constipation [Diarrhea] : no diarrhea [Dysuria] : no dysuria [Incontinence] : no incontinence [Joint Stiffness] : no joint stiffness [Muscle Pain] : no muscle pain [Skin Rash] : no skin rash [Skin Wound] : no skin wound [Confused] : no confusion [Dizziness] : no dizziness [Fainting] : no fainting [Difficulty Walking] : no difficulty walking [Suicidal] : not suicidal [Insomnia] : no insomnia [Anxiety] : no anxiety [Depression] : no depression [Proptosis] : no proptosis [Hot Flashes] : no hot flashes [Muscle Weakness] : no muscle weakness [Easy Bleeding] : no tendency for easy bleeding [Swollen Glands] : no swollen glands [Easy Bruising] : no tendency for easy bruising [FreeTextEntry7] : no BRPBR. Last colonoscopy -2019 [FreeTextEntry9] : R knee pain, shoulders

## 2020-07-13 NOTE — RESULTS/DATA
[FreeTextEntry1] : Today's CBC (On 7/13/20) wbc 4.8 hb 8.9 plt 161\par \par On 5/12/20) wbc 11.7 Hb 8.3 plt 221\par ON 5/7/20 wbc 7.7 Hb 9.7 plt 235  total bili 3.9\par On 5/2/20 wbc 5.3 Hb 6.8 plt 224\par On 4/29/20 wbc 3.6 Hb 8.1 plt 241\par On 2/12/20 wbc 4.2 Hb10.4 plt 227\par \par RADIOLOGY\par on 4/30/20 CT A/P \par LOWER CHEST: Prior sternotomy. Reticular scarring/atelectasis bilaterally, right more than left.\par \par LIVER: Within normal limits.\par BILE DUCTS: Normal caliber.\par GALLBLADDER: Cholelithiasis.\par SPLEEN: Within normal limits.\par PANCREAS: Within normal limits.\par ADRENALS: Stable 1.5 cm sized left adrenal nodule. This is not definitely characterized.\par KIDNEYS/URETERS: Approximately 4 cm sized cyst involving the lower left kidney. Slightly smaller right kidney.\par \par BLADDER: Limited evaluation due to artifacts from left hip prosthesis.\par REPRODUCTIVE ORGANS: Limited evaluation due to artifact from left hip prosthesis.\par \par BOWEL: No bowel obstruction. Appendix is normal. The appearance of gastric antrum may be related to peristalsis or mural thickening. If clinically indicated, endoscopy may be considered for further evaluation.\par PERITONEUM: No ascites.\par VESSELS: Within normal limits.\par RETROPERITONEUM/LYMPH NODES: No lymphadenopathy.  \par ABDOMINAL WALL: Asymmetric atrophy of the right lateral inguinal musculature.\par BONES: Left hip prosthesis. Degenerative disc disease.\par \par IMPRESSION: \par \par Cholelithiasis without any findings to suggest acute cholecystitis or any biliary obstruction.\par \par Peristalsis versus mural thickening gastric antrum.\par

## 2020-07-13 NOTE — ASSESSMENT
[FreeTextEntry1] : 69 yo M with type 2 DM, CM s/p cardiac transplant in 2018 (s/p IVIg/plasmaphersis and Rituxan in 2018 for antibody-mediated rejection), CRI (creat baseline 1.5), referred for AIHA management\par \par -pt started on high dose Prednisone (1mg/kg=75mg po daily) on 5/1/20. Currently taking 60mg Prednisone once daily. Cont to monitor weekly hemolysis labs: CBC, LDH, retic count, CMP with direct/indirect bilirubin, haptoglobin.\par Will also do G6PD at today's visit. Peripheral smear reviewed today -no microspherocytes, few retics. No schistocytes. \par \par -cont Bactrim for PCP prophylaxis\par \par -again d/w pt to follow up with GI for upper EGD as per hospital discharge\par \par -follow up in 2 months Infliximab Counseling:  I discussed with the patient the risks of infliximab including but not limited to myelosuppression, immunosuppression, autoimmune hepatitis, demyelinating diseases, lymphoma, and serious infections.  The patient understands that monitoring is required including a PPD at baseline and must alert us or the primary physician if symptoms of infection or other concerning signs are noted.

## 2020-07-14 ENCOUNTER — APPOINTMENT (OUTPATIENT)
Dept: ENDOCRINOLOGY | Facility: CLINIC | Age: 70
End: 2020-07-14
Payer: MEDICARE

## 2020-07-14 VITALS
SYSTOLIC BLOOD PRESSURE: 130 MMHG | DIASTOLIC BLOOD PRESSURE: 86 MMHG | HEART RATE: 130 BPM | BODY MASS INDEX: 26.52 KG/M2 | TEMPERATURE: 97.9 F | WEIGHT: 173 LBS | HEIGHT: 67.9 IN | OXYGEN SATURATION: 97 %

## 2020-07-14 LAB
BILIRUB DIRECT SERPL-MCNC: 0.3 MG/DL
BILIRUB INDIRECT SERPL-MCNC: 0.6 MG/DL
TACROLIMUS SERPL-MCNC: <2 NG/ML

## 2020-07-14 PROCEDURE — 99215 OFFICE O/P EST HI 40 MIN: CPT

## 2020-07-14 NOTE — PROGRESS NOTE ADULT - ATTENDING COMMENTS
July 14, 2020      Deisy Estevez, DO  [address]  [address]  [place] [address]           [facility] - Pediatric ENT  [address] [address]  Phone: [phone]  Fax: [phone]          Patient: Julienne Ashraf   MR Number: [id number]   YOB: 2015   Date of Visit: 7/14/2020       Dear Dr. Deisy Estevez:    Thank you for referring Julienne Ashraf to me for evaluation. Attached you will find relevant portions of my assessment and plan of care.    If you have questions, please do not hesitate to call me. I look forward to following Julienne Ashraf along with you.    Sincerely,    Alf Alexander MD    Enclosure  CC:  No Recipients    If you would like to receive this communication electronically, please contact [email or web address] or [phone] to request more information on [employer] access.    For providers and/or their staff who would like to refer a patient to [facility], please contact us through our one-stop-shop provider referral line, [facility], at [phone].    If you feel you have received this communication in error or would no longer like to receive these types of communications, please e-mail [email or web address]          Marga Martines, PhD  [phone]

## 2020-07-15 NOTE — HISTORY OF PRESENT ILLNESS
[de-identified] : This is very nice 69-year-old gentleman experiencing chronic right knee pain, which is severe in intensity. The pain substantially limits activities of daily living. Walking tolerance is reduced. Medication and activity modification have been minimally effective for a period lasting greater than three months in duration. Assistive devices and external support were not deemed by the patient to be helpful in improving their function. Due to the severity of osteoarthritis and level of pain, physical therapy is contraindicated. This patient does have significant medical issues preventing surgical intervention. He does use a cane. Pain and restriction of function are intolerable at this time.

## 2020-07-15 NOTE — PHYSICAL EXAM
[de-identified] : Patient is well nourished, well-developed, in no acute distress, with appropriate mood and affect. The patient is oriented to time, place, and person. Respirations are even and unlabored. Gait evaluation does reveal a limp. There is no inguinal adenopathy. Examination of the contralateral knee shows normal range of motion, strength, no tenderness, and intact skin. The affected limb is well-perfused, without skin lesions, shows a grossly normal motor and sensory examination. Knee motion is significantly reduced and does cause significant pain. The right knee moves from  degrees. The knee is stable within that range-of-motion to AP and ML stress. The alignment of the knee is 20° of varus that is partially correctable. Muscle strength is normal. Pedal pulses are palpable. Hip examination was negative.\par  [de-identified] : Long standing knee, AP knee, lateral knee, and patellar views of the right knee were ordered and taken in the office and demonstrate severe degenerative joint disease of the knee with joint space narrowing, osteophyte formation, and subchondral sclerosis.

## 2020-07-15 NOTE — ADDENDUM
[FreeTextEntry1] : Patient was prescribed a custom medial  brace for left knee derangement to be used as ambulatory aide.

## 2020-07-15 NOTE — DISCUSSION/SUMMARY
[de-identified] : This patient has severe 20° varus right knee osteoarthritis.The patient is not an appropriate candidate for total knee arthroplasty at this time. We had a prolonged conversation regarding the significant risk that probably exceed 10-15% if we proceed with a total knee arthroplasty in this patient. An extensive discussion was conducted on the natural history of the disease and the variety of surgical and non-surgical options available to the patient including, but not limited to non-steroidal anti-inflammatory medications, steroid injections, physical therapy, maintenance of ideal body weight, and reduction of activity. Today we performed a right knee intra-articular cortisone injection. I recommended that he trial a custom knee brace given that a standard off-the-shelf brace would not fit the significant deformity that he demonstrates. The patient will schedule an appointment as needed.\par \par Informed consent for the right knee injection was obtained. All questions were answered. A time out was performed. The right knee was prepped and draped in sterile fashion. Using sterile technique, the right knee was injection of 2cc of Kenalog, 4cc of 1% lidocaine, 2cc of 0.5% marcaine using a 21-gauge needle. A sterile dressing was applied. Post injection instructions were reviewed. The patient tolerated the procedure well.\par

## 2020-07-17 LAB
C PEPTIDE SERPL-MCNC: 4.7 NG/ML
CHOLEST SERPL-MCNC: 129 MG/DL
CHOLEST/HDLC SERPL: 1.6 RATIO
HDLC SERPL-MCNC: 83 MG/DL
LDLC SERPL CALC-MCNC: 16 MG/DL
TRIGL SERPL-MCNC: 153 MG/DL
TSH SERPL-ACNC: 2.33 UIU/ML

## 2020-07-27 ENCOUNTER — LABORATORY RESULT (OUTPATIENT)
Age: 70
End: 2020-07-27

## 2020-07-27 LAB — G6PD SER-CCNC: 8 U/G HGB

## 2020-07-29 LAB
ALBUMIN SERPL ELPH-MCNC: 3.9 G/DL
ALP BLD-CCNC: 81 U/L
ALT SERPL-CCNC: 48 U/L
ANION GAP SERPL CALC-SCNC: 18 MMOL/L
AST SERPL-CCNC: 39 U/L
BASOPHILS # BLD AUTO: 0 K/UL
BASOPHILS NFR BLD AUTO: 0 %
BILIRUB DIRECT SERPL-MCNC: 0.3 MG/DL
BILIRUB INDIRECT SERPL-MCNC: 0.5 MG/DL
BILIRUB SERPL-MCNC: 0.7 MG/DL
BUN SERPL-MCNC: 19 MG/DL
CALCIUM SERPL-MCNC: 8.6 MG/DL
CHLORIDE SERPL-SCNC: 107 MMOL/L
CO2 SERPL-SCNC: 25 MMOL/L
CREAT SERPL-MCNC: 1.3 MG/DL
EOSINOPHIL # BLD AUTO: 0 K/UL
EOSINOPHIL NFR BLD AUTO: 0 %
GLUCOSE SERPL-MCNC: 182 MG/DL
HCT VFR BLD CALC: 30.1 %
HGB BLD-MCNC: 8.8 G/DL
LDH SERPL-CCNC: 586 U/L
LYMPHOCYTES # BLD AUTO: 0.49 K/UL
LYMPHOCYTES NFR BLD AUTO: 10 %
MAN DIFF?: NORMAL
MCHC RBC-ENTMCNC: 29.2 GM/DL
MCHC RBC-ENTMCNC: 32.1 PG
MCV RBC AUTO: 109.9 FL
MONOCYTES # BLD AUTO: 0.36 K/UL
MONOCYTES NFR BLD AUTO: 7.3 %
NEUTROPHILS # BLD AUTO: 4.05 K/UL
NEUTROPHILS NFR BLD AUTO: 77.3 %
PLATELET # BLD AUTO: 159 K/UL
POTASSIUM SERPL-SCNC: 3.6 MMOL/L
PROT SERPL-MCNC: 5.8 G/DL
RBC # BLD: 2.74 M/UL
RBC # BLD: 2.74 M/UL
RBC # FLD: 21.1 %
RETICS # AUTO: 4.3 %
RETICS AGGREG/RBC NFR: 118.9 K/UL
SODIUM SERPL-SCNC: 151 MMOL/L
WBC # FLD AUTO: 4.9 K/UL

## 2020-07-31 ENCOUNTER — APPOINTMENT (OUTPATIENT)
Dept: ENDOCRINOLOGY | Facility: CLINIC | Age: 70
End: 2020-07-31

## 2020-08-10 ENCOUNTER — LABORATORY RESULT (OUTPATIENT)
Age: 70
End: 2020-08-10

## 2020-08-11 ENCOUNTER — INPATIENT (INPATIENT)
Facility: HOSPITAL | Age: 70
LOS: 51 days | Discharge: HOME | DRG: 808 | End: 2020-10-02
Attending: THORACIC SURGERY (CARDIOTHORACIC VASCULAR SURGERY) | Admitting: THORACIC SURGERY (CARDIOTHORACIC VASCULAR SURGERY)
Payer: MEDICARE

## 2020-08-11 VITALS
SYSTOLIC BLOOD PRESSURE: 148 MMHG | WEIGHT: 166.89 LBS | HEIGHT: 68 IN | HEART RATE: 133 BPM | OXYGEN SATURATION: 98 % | TEMPERATURE: 98 F | RESPIRATION RATE: 30 BRPM | DIASTOLIC BLOOD PRESSURE: 99 MMHG

## 2020-08-11 DIAGNOSIS — Z94.9 TRANSPLANTED ORGAN AND TISSUE STATUS, UNSPECIFIED: ICD-10-CM

## 2020-08-11 DIAGNOSIS — Z98.890 OTHER SPECIFIED POSTPROCEDURAL STATES: Chronic | ICD-10-CM

## 2020-08-11 DIAGNOSIS — D64.9 ANEMIA, UNSPECIFIED: ICD-10-CM

## 2020-08-11 DIAGNOSIS — Z94.1 HEART TRANSPLANT STATUS: Chronic | ICD-10-CM

## 2020-08-11 LAB
ALBUMIN SERPL ELPH-MCNC: 3.5 G/DL — SIGNIFICANT CHANGE UP (ref 3.3–5)
ALBUMIN SERPL ELPH-MCNC: 3.6 G/DL — SIGNIFICANT CHANGE UP (ref 3.3–5)
ALBUMIN SERPL ELPH-MCNC: 3.7 G/DL — SIGNIFICANT CHANGE UP (ref 3.3–5)
ALP SERPL-CCNC: 70 U/L — SIGNIFICANT CHANGE UP (ref 40–120)
ALP SERPL-CCNC: 71 U/L — SIGNIFICANT CHANGE UP (ref 40–120)
ALP SERPL-CCNC: 77 U/L — SIGNIFICANT CHANGE UP (ref 40–120)
ALT FLD-CCNC: 30 U/L — SIGNIFICANT CHANGE UP (ref 10–45)
ALT FLD-CCNC: 32 U/L — SIGNIFICANT CHANGE UP (ref 10–45)
ALT FLD-CCNC: 32 U/L — SIGNIFICANT CHANGE UP (ref 10–45)
ANION GAP SERPL CALC-SCNC: 13 MMOL/L — SIGNIFICANT CHANGE UP (ref 5–17)
ANION GAP SERPL CALC-SCNC: 13 MMOL/L — SIGNIFICANT CHANGE UP (ref 5–17)
ANION GAP SERPL CALC-SCNC: 14 MMOL/L — SIGNIFICANT CHANGE UP (ref 5–17)
APTT BLD: 19 SEC — LOW (ref 27.5–35.5)
APTT BLD: 25.3 SEC — LOW (ref 27.5–35.5)
AST SERPL-CCNC: 30 U/L — SIGNIFICANT CHANGE UP (ref 10–40)
AST SERPL-CCNC: 34 U/L — SIGNIFICANT CHANGE UP (ref 10–40)
AST SERPL-CCNC: 55 U/L — HIGH (ref 10–40)
BASOPHILS # BLD AUTO: 0 K/UL
BASOPHILS # BLD AUTO: 0 K/UL — SIGNIFICANT CHANGE UP (ref 0–0.2)
BASOPHILS # BLD AUTO: 0 K/UL — SIGNIFICANT CHANGE UP (ref 0–0.2)
BASOPHILS NFR BLD AUTO: 0 %
BASOPHILS NFR BLD AUTO: 0 % — SIGNIFICANT CHANGE UP (ref 0–2)
BASOPHILS NFR BLD AUTO: 0 % — SIGNIFICANT CHANGE UP (ref 0–2)
BILIRUB INDIRECT SERPL-MCNC: 0.4 MG/DL
BILIRUB SERPL-MCNC: 0.5 MG/DL — SIGNIFICANT CHANGE UP (ref 0.2–1.2)
BILIRUB SERPL-MCNC: 0.5 MG/DL — SIGNIFICANT CHANGE UP (ref 0.2–1.2)
BILIRUB SERPL-MCNC: 0.6 MG/DL — SIGNIFICANT CHANGE UP (ref 0.2–1.2)
BLD GP AB SCN SERPL QL: NEGATIVE — SIGNIFICANT CHANGE UP
BUN SERPL-MCNC: 25 MG/DL — HIGH (ref 7–23)
BUN SERPL-MCNC: 26 MG/DL — HIGH (ref 7–23)
BUN SERPL-MCNC: 27 MG/DL — HIGH (ref 7–23)
CALCIUM SERPL-MCNC: 8.9 MG/DL — SIGNIFICANT CHANGE UP (ref 8.4–10.5)
CALCIUM SERPL-MCNC: 8.9 MG/DL — SIGNIFICANT CHANGE UP (ref 8.4–10.5)
CALCIUM SERPL-MCNC: 9.1 MG/DL — SIGNIFICANT CHANGE UP (ref 8.4–10.5)
CHLORIDE SERPL-SCNC: 102 MMOL/L — SIGNIFICANT CHANGE UP (ref 96–108)
CHLORIDE SERPL-SCNC: 102 MMOL/L — SIGNIFICANT CHANGE UP (ref 96–108)
CHLORIDE SERPL-SCNC: 103 MMOL/L — SIGNIFICANT CHANGE UP (ref 96–108)
CO2 SERPL-SCNC: 22 MMOL/L — SIGNIFICANT CHANGE UP (ref 22–31)
CO2 SERPL-SCNC: 24 MMOL/L — SIGNIFICANT CHANGE UP (ref 22–31)
CO2 SERPL-SCNC: 26 MMOL/L — SIGNIFICANT CHANGE UP (ref 22–31)
CREAT SERPL-MCNC: 1.39 MG/DL — HIGH (ref 0.5–1.3)
CREAT SERPL-MCNC: 1.42 MG/DL — HIGH (ref 0.5–1.3)
CREAT SERPL-MCNC: 1.47 MG/DL — HIGH (ref 0.5–1.3)
EOSINOPHIL # BLD AUTO: 0 K/UL
EOSINOPHIL # BLD AUTO: 0 K/UL — SIGNIFICANT CHANGE UP (ref 0–0.5)
EOSINOPHIL # BLD AUTO: 0 K/UL — SIGNIFICANT CHANGE UP (ref 0–0.5)
EOSINOPHIL NFR BLD AUTO: 0 %
EOSINOPHIL NFR BLD AUTO: 0 % — SIGNIFICANT CHANGE UP (ref 0–6)
EOSINOPHIL NFR BLD AUTO: 0 % — SIGNIFICANT CHANGE UP (ref 0–6)
GLUCOSE BLDC GLUCOMTR-MCNC: 249 MG/DL — HIGH (ref 70–99)
GLUCOSE BLDC GLUCOMTR-MCNC: 281 MG/DL — HIGH (ref 70–99)
GLUCOSE SERPL-MCNC: 209 MG/DL — HIGH (ref 70–99)
GLUCOSE SERPL-MCNC: 266 MG/DL — HIGH (ref 70–99)
GLUCOSE SERPL-MCNC: 282 MG/DL — HIGH (ref 70–99)
HCT VFR BLD CALC: 20.7 % — CRITICAL LOW (ref 39–50)
HCT VFR BLD CALC: 21.7 % — LOW (ref 39–50)
HCT VFR BLD CALC: 24.7 %
HGB BLD-MCNC: 6.4 G/DL — CRITICAL LOW (ref 13–17)
HGB BLD-MCNC: 6.6 G/DL — CRITICAL LOW (ref 13–17)
HGB BLD-MCNC: 6.9 G/DL
IMM GRANULOCYTES NFR BLD AUTO: 13.9 % — HIGH (ref 0–1.5)
INR BLD: 1 RATIO — SIGNIFICANT CHANGE UP (ref 0.88–1.16)
INR BLD: 1.03 RATIO — SIGNIFICANT CHANGE UP (ref 0.88–1.16)
LDH SERPL-CCNC: 590 U/L
LYMPHOCYTES # BLD AUTO: 0.12 K/UL — LOW (ref 1–3.3)
LYMPHOCYTES # BLD AUTO: 0.2 K/UL — LOW (ref 1–3.3)
LYMPHOCYTES # BLD AUTO: 0.37 K/UL
LYMPHOCYTES # BLD AUTO: 4 % — LOW (ref 13–44)
LYMPHOCYTES # BLD AUTO: 5.9 % — LOW (ref 13–44)
LYMPHOCYTES NFR BLD AUTO: 11.9 %
MAN DIFF?: NORMAL
MCHC RBC-ENTMCNC: 27.9 GM/DL
MCHC RBC-ENTMCNC: 30.4 GM/DL — LOW (ref 32–36)
MCHC RBC-ENTMCNC: 30.9 GM/DL — LOW (ref 32–36)
MCHC RBC-ENTMCNC: 31.8 PG
MCHC RBC-ENTMCNC: 32.8 PG — SIGNIFICANT CHANGE UP (ref 27–34)
MCHC RBC-ENTMCNC: 33 PG — SIGNIFICANT CHANGE UP (ref 27–34)
MCV RBC AUTO: 106.7 FL — HIGH (ref 80–100)
MCV RBC AUTO: 108 FL — HIGH (ref 80–100)
MCV RBC AUTO: 113.8 FL
MONOCYTES # BLD AUTO: 0.06 K/UL
MONOCYTES # BLD AUTO: 0.06 K/UL — SIGNIFICANT CHANGE UP (ref 0–0.9)
MONOCYTES # BLD AUTO: 0.14 K/UL — SIGNIFICANT CHANGE UP (ref 0–0.9)
MONOCYTES NFR BLD AUTO: 1.8 %
MONOCYTES NFR BLD AUTO: 2 % — SIGNIFICANT CHANGE UP (ref 2–14)
MONOCYTES NFR BLD AUTO: 4.1 % — SIGNIFICANT CHANGE UP (ref 2–14)
NEUTROPHILS # BLD AUTO: 2.57 K/UL
NEUTROPHILS # BLD AUTO: 2.58 K/UL — SIGNIFICANT CHANGE UP (ref 1.8–7.4)
NEUTROPHILS # BLD AUTO: 2.73 K/UL — SIGNIFICANT CHANGE UP (ref 1.8–7.4)
NEUTROPHILS NFR BLD AUTO: 74 % — SIGNIFICANT CHANGE UP (ref 43–77)
NEUTROPHILS NFR BLD AUTO: 76.1 % — SIGNIFICANT CHANGE UP (ref 43–77)
NEUTROPHILS NFR BLD AUTO: 76.2 %
NRBC # BLD: 2 /100 WBCS — HIGH (ref 0–0)
NT-PROBNP SERPL-SCNC: 736 PG/ML — HIGH (ref 0–300)
PLATELET # BLD AUTO: 175 K/UL — SIGNIFICANT CHANGE UP (ref 150–400)
PLATELET # BLD AUTO: 185 K/UL
PLATELET # BLD AUTO: 185 K/UL — SIGNIFICANT CHANGE UP (ref 150–400)
POTASSIUM SERPL-MCNC: 4.3 MMOL/L — SIGNIFICANT CHANGE UP (ref 3.5–5.3)
POTASSIUM SERPL-MCNC: 4.4 MMOL/L — SIGNIFICANT CHANGE UP (ref 3.5–5.3)
POTASSIUM SERPL-MCNC: 4.6 MMOL/L — SIGNIFICANT CHANGE UP (ref 3.5–5.3)
POTASSIUM SERPL-SCNC: 4.3 MMOL/L — SIGNIFICANT CHANGE UP (ref 3.5–5.3)
POTASSIUM SERPL-SCNC: 4.4 MMOL/L — SIGNIFICANT CHANGE UP (ref 3.5–5.3)
POTASSIUM SERPL-SCNC: 4.6 MMOL/L — SIGNIFICANT CHANGE UP (ref 3.5–5.3)
PROT SERPL-MCNC: 5.6 G/DL — LOW (ref 6–8.3)
PROT SERPL-MCNC: 5.8 G/DL — LOW (ref 6–8.3)
PROT SERPL-MCNC: 6.1 G/DL — SIGNIFICANT CHANGE UP (ref 6–8.3)
PROTHROM AB SERPL-ACNC: 11.9 SEC — SIGNIFICANT CHANGE UP (ref 10.6–13.6)
PROTHROM AB SERPL-ACNC: 12.2 SEC — SIGNIFICANT CHANGE UP (ref 10.6–13.6)
RBC # BLD: 1.94 M/UL — LOW (ref 4.2–5.8)
RBC # BLD: 2.01 M/UL — LOW (ref 4.2–5.8)
RBC # BLD: 2.17 M/UL
RBC # BLD: 2.17 M/UL
RBC # FLD: 19.4 % — HIGH (ref 10.3–14.5)
RBC # FLD: 19.6 % — HIGH (ref 10.3–14.5)
RBC # FLD: 19.9 %
RETICS # AUTO: 4.6 %
RETICS AGGREG/RBC NFR: 101 K/UL
RH IG SCN BLD-IMP: POSITIVE — SIGNIFICANT CHANGE UP
SARS-COV-2 RNA SPEC QL NAA+PROBE: SIGNIFICANT CHANGE UP
SODIUM SERPL-SCNC: 138 MMOL/L — SIGNIFICANT CHANGE UP (ref 135–145)
SODIUM SERPL-SCNC: 140 MMOL/L — SIGNIFICANT CHANGE UP (ref 135–145)
SODIUM SERPL-SCNC: 141 MMOL/L — SIGNIFICANT CHANGE UP (ref 135–145)
WBC # BLD: 2.9 K/UL — LOW (ref 3.8–10.5)
WBC # BLD: 3.39 K/UL — LOW (ref 3.8–10.5)
WBC # FLD AUTO: 2.9 K/UL — LOW (ref 3.8–10.5)
WBC # FLD AUTO: 3.11 K/UL
WBC # FLD AUTO: 3.39 K/UL — LOW (ref 3.8–10.5)

## 2020-08-11 PROCEDURE — 71045 X-RAY EXAM CHEST 1 VIEW: CPT | Mod: 26

## 2020-08-11 PROCEDURE — 93010 ELECTROCARDIOGRAM REPORT: CPT

## 2020-08-11 PROCEDURE — 99223 1ST HOSP IP/OBS HIGH 75: CPT

## 2020-08-11 PROCEDURE — 99285 EMERGENCY DEPT VISIT HI MDM: CPT | Mod: CS

## 2020-08-11 RX ORDER — VALGANCICLOVIR 450 MG/1
450 TABLET, FILM COATED ORAL DAILY
Refills: 0 | Status: DISCONTINUED | OUTPATIENT
Start: 2020-08-11 | End: 2020-08-14

## 2020-08-11 RX ORDER — INSULIN LISPRO 100/ML
7 VIAL (ML) SUBCUTANEOUS
Refills: 0 | Status: DISCONTINUED | OUTPATIENT
Start: 2020-08-11 | End: 2020-08-14

## 2020-08-11 RX ORDER — POSACONAZOLE 100 MG/1
300 TABLET, DELAYED RELEASE ORAL DAILY
Refills: 0 | Status: DISCONTINUED | OUTPATIENT
Start: 2020-08-11 | End: 2020-08-28

## 2020-08-11 RX ORDER — CHOLECALCIFEROL (VITAMIN D3) 125 MCG
1000 CAPSULE ORAL DAILY
Refills: 0 | Status: DISCONTINUED | OUTPATIENT
Start: 2020-08-11 | End: 2020-08-27

## 2020-08-11 RX ORDER — INSULIN GLARGINE 100 [IU]/ML
14 INJECTION, SOLUTION SUBCUTANEOUS AT BEDTIME
Refills: 0 | Status: DISCONTINUED | OUTPATIENT
Start: 2020-08-11 | End: 2020-08-14

## 2020-08-11 RX ORDER — DEXTROSE 50 % IN WATER 50 %
12.5 SYRINGE (ML) INTRAVENOUS ONCE
Refills: 0 | Status: DISCONTINUED | OUTPATIENT
Start: 2020-08-11 | End: 2020-08-18

## 2020-08-11 RX ORDER — SODIUM CHLORIDE 9 MG/ML
3 INJECTION INTRAMUSCULAR; INTRAVENOUS; SUBCUTANEOUS EVERY 8 HOURS
Refills: 0 | Status: DISCONTINUED | OUTPATIENT
Start: 2020-08-11 | End: 2020-08-30

## 2020-08-11 RX ORDER — MAGNESIUM OXIDE 400 MG ORAL TABLET 241.3 MG
400 TABLET ORAL DAILY
Refills: 0 | Status: DISCONTINUED | OUTPATIENT
Start: 2020-08-11 | End: 2020-08-27

## 2020-08-11 RX ORDER — INSULIN LISPRO 100/ML
5 VIAL (ML) SUBCUTANEOUS
Refills: 0 | Status: DISCONTINUED | OUTPATIENT
Start: 2020-08-11 | End: 2020-08-14

## 2020-08-11 RX ORDER — EVEROLIMUS 10 MG/1
0.75 TABLET ORAL
Refills: 0 | Status: DISCONTINUED | OUTPATIENT
Start: 2020-08-11 | End: 2020-08-12

## 2020-08-11 RX ORDER — PANTOPRAZOLE SODIUM 20 MG/1
40 TABLET, DELAYED RELEASE ORAL
Refills: 0 | Status: DISCONTINUED | OUTPATIENT
Start: 2020-08-11 | End: 2020-08-13

## 2020-08-11 RX ORDER — FOLIC ACID 0.8 MG
1 TABLET ORAL DAILY
Refills: 0 | Status: DISCONTINUED | OUTPATIENT
Start: 2020-08-11 | End: 2020-08-27

## 2020-08-11 RX ORDER — DEXTROSE 50 % IN WATER 50 %
15 SYRINGE (ML) INTRAVENOUS ONCE
Refills: 0 | Status: DISCONTINUED | OUTPATIENT
Start: 2020-08-11 | End: 2020-08-14

## 2020-08-11 RX ORDER — ASPIRIN/CALCIUM CARB/MAGNESIUM 324 MG
81 TABLET ORAL DAILY
Refills: 0 | Status: DISCONTINUED | OUTPATIENT
Start: 2020-08-11 | End: 2020-08-14

## 2020-08-11 RX ORDER — SODIUM BICARBONATE 1 MEQ/ML
650 SYRINGE (ML) INTRAVENOUS
Refills: 0 | Status: DISCONTINUED | OUTPATIENT
Start: 2020-08-11 | End: 2020-08-21

## 2020-08-11 RX ADMIN — INSULIN GLARGINE 14 UNIT(S): 100 INJECTION, SOLUTION SUBCUTANEOUS at 23:30

## 2020-08-11 RX ADMIN — SODIUM CHLORIDE 3 MILLILITER(S): 9 INJECTION INTRAMUSCULAR; INTRAVENOUS; SUBCUTANEOUS at 23:00

## 2020-08-11 RX ADMIN — Medication 20 MILLIGRAM(S): at 23:18

## 2020-08-11 NOTE — H&P ADULT - NSHPPHYSICALEXAM_GEN_ALL_CORE
Constitutional:  Well developed, well nourished  Eyes: EOMI, PERRL  ENMT: WDL  Neck: no bruits ,no thyromegaly  Breasts :not examined  Back: no deformities no kyphosis  Respiratory: Breath  sounds equal& clear throughout  Cardiovascular:  S1 S2 RRR regular rate and rhythm no murmurs rubs  Gastrointestinal: Soft nontender positive bowels sounds  one soft tarry stool   Genitourinary: not examined  Rectal :not examined  Extremities: + pedal pulse no edema  Vascular :Equal and normal throughout  Neurological :Alert and oriented no focal deficits  Skin: normal no rashes   Lymph Nodes: non tender   Musculoskeletal: equal  strength throughout  MTI healed  RT SCL PIV placed in ED

## 2020-08-11 NOTE — ED ADULT NURSE REASSESSMENT NOTE - NS ED NURSE REASSESS COMMENT FT1
Patient ordered for stat labs 5 mins prior to being assigned a bed upstairs, patient previously denied ultrasound IV for additional labs, patient is a hard stick and unable to get labs. MD Canales aware

## 2020-08-11 NOTE — ED PROVIDER NOTE - CLINICAL SUMMARY MEDICAL DECISION MAKING FREE TEXT BOX
Kwadwo: 70 year old male with heart transplant 2/2018 sent in by cardiologist for abnormal labs yesterday. told Hgb is low. reports ? dark stool yesterday  also reports LOBATO x weeks. no cp, no fever, no cough. will get labs, consult cardiac surgery. admit for transfusion.

## 2020-08-11 NOTE — ED CLERICAL - NS ED CLERK NOTE PRE-ARRIVAL INFORMATION; ADDITIONAL PRE-ARRIVAL INFORMATION
CC/Reason For referral: heart transplant on 2/23/18 now with hemolytic anemia and needs transfusions.   Preferred Consultant(if applicable): call on call MD for Heart Failure Team  Who admits for you (if needed): admit under Dr. Parker  Do you have documents you would like to fax over?  Would you still like to speak to an ED attending? Not necessary to call Dr. Parker/ request is to call on call MD for the Heart Failure Team

## 2020-08-11 NOTE — H&P ADULT - HISTORY OF PRESENT ILLNESS
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 Patient was treated w/ prednisone and Rituximab. Tacro was discontinued and patient was also transitioned to everolimus  Admitted  6/16-6/19  for hyperglycemia.  Reported to transplant team having one done black tarry stool-  instructed to  present to [facility] for anemia management. Patient has been following with Hematology outpatient.  Denies CP  N/V diarrhea SOB. 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 Patient was treated w/ prednisone and Rituximab. Tacro was discontinued and patient was also transitioned to everolimus  Admitted  6/16-6/19  for hyperglycemia.  Reported to transplant team having one done black tarry stool-  instructed to  present to [facility] for hemolytic anemia. Patient has been following with Hematology outpatient.  Denies CP  N/V diarrhea SOB.

## 2020-08-11 NOTE — ED PROVIDER NOTE - CONSTITUTIONAL MANNER
Outcome: Successful outpatient ophthalmic surgical procedure  Preprinted Instructions given to patient.  Regular diet.  Activity: No restrictions  Meds: see Med Rec  Condition: stable  Follow up: 1 day with Dr Hays  Disposition: Home  Diagnosis: s/p eye surgery   appropriate for situation

## 2020-08-11 NOTE — H&P ADULT - NSICDXPASTSURGICALHX_GEN_ALL_CORE_FT
PAST SURGICAL HISTORY:  AICD (Automatic Cardioverter/Defibrillator) Present [employer] [place] with 1 [employer] [place] lead4/1/09- explanted and replaced with myTipstronic 2 leads on 9/2/09    H/O heart transplant 2/2018    History of Prior Ablation Treatment for afib    S/P right heart catheterization biopsy multiple    Status post left hip replacement

## 2020-08-11 NOTE — ED ADULT NURSE REASSESSMENT NOTE - NS ED NURSE REASSESS COMMENT FT1
Report received from DIXON Kiser, patient alert and oriented, aware he is being admitted to the hospital. Admitting sx team at bedside with patient, Bed locked and in lowest position

## 2020-08-11 NOTE — H&P ADULT - PROBLEM/PLAN-1
DISPLAY PLAN FREE TEXT You can access the FollowMyHealth Patient Portal offered by [facility] by registering at the following website: [email or web address]. By joining [employer]’s FollowMyHealth portal, you will also be able to view your health information using other applications (apps) compatible with our system.

## 2020-08-11 NOTE — ED ADULT NURSE REASSESSMENT NOTE - NS ED NURSE REASSESS COMMENT FT1
Patient given urinal, denies need to be helped. Educated not to get up out of bed, if he needs helps educated to call for assistance. Bed locked and in lowest position.

## 2020-08-11 NOTE — ED PROVIDER NOTE - PMH
CHF (Congestive Heart Failure)    DVT of upper extremity (deep vein thrombosis)    Former smoker    GIB (gastrointestinal bleeding)    H/O autoimmune hemolytic anemia    H/O hemolytic anemia    Hepatitis C virus    HLD (hyperlipidemia)    HTN    Knee pain, right    Non-Ischemic Cardiomyopathy    PAF (paroxysmal atrial fibrillation)  on xarelto  SVT (Supraventricular Tachycardia)    Ventricular fibrillation  s/p AICD

## 2020-08-11 NOTE — H&P ADULT - NSHPREVIEWOFSYSTEMS_GEN_ALL_CORE
GENERAL SYMPTOMS: No weakness, fevers or chills  ENT: No visual changes;  No vertigo or throat pain   SKIN/BREAST: Negative  NECK: No pain or stiffness  RESPIRATORY/THORAX: No cough, wheezing, hemoptysis; No shortness of breath  CARDIOVASCULAR: No chest pain or palpitations  GASTROINTESTINAL: No abdominal or epigastric pain. No nausea, vomiting, or hematemesis; No diarrhea or constipation. One  black tarry  stool  GENITOURINARY: No dysuria, frequency or hematuria  NEUROLOGICAL: No numbness or weakness  MUSCULOSKELETAL: equal strength throughout   SKIN: No itching, burning, rashes, or lesions   All other review of systems is negative unless indicated above.  HEMATOLOGY/LYMPHATICS: Negative  ENDOCRINE: Negative

## 2020-08-11 NOTE — ED PROVIDER NOTE - PSH
AICD (Automatic Cardioverter/Defibrillator) Present  [employer] with 1 [employer] [place] lead4/1/09- explanted and replaced with Sue[employer]tronic 2 leads on 9/2/09  H/O heart transplant  2/2018  History of Prior Ablation Treatment  for afib  S/P right heart catheterization  biopsy multiple  Status post left hip replacement

## 2020-08-11 NOTE — ED ADULT TRIAGE NOTE - PAIN RATING/NUMBER SCALE (0-10): ACTIVITY
[facility]  Obstetrics  Discharge Summary      Patient Name: Britt Roldan  MRN: [id number]  Admission Date: 2018  Hospital Length of Stay: 3 days  Discharge Date and Time:  2018 7:16 AM  Attending Physician: MARY Patricio MD   Discharging Provider: GURVINDER Macedo MD  Primary Care Provider: Moustapha SNOW Age, APRN    HPI:  Britt Roldan is a 38 y.o.  female with IUP at 39w2d weeks gestation who is admitted for scheduled  Section secondary to prior c/s x2.     This IUP is complicated by GBS+ status, prior c/s x2.  Patient denies contractions, denies vaginal bleeding, denies LOF.   Fetal Movement: normal.     Procedure(s) (LRB):  DELIVERY- SECTION (N/A)     Hospital Course:   2018 - Schedule repeat  delivery, uncomplicated.  2018 - POD # 1 s/p RLTCS. Patient doing well. Starting to meet Post-Op Goals.   2018 - POD # 2. Meeting all milestone, requesting discharge.          Final Active Diagnoses:    Diagnosis Date Noted POA    PRINCIPAL PROBLEM:  S/P repeat low transverse  [Z98.891] 2018 Not Applicable    Acute blood loss anemia [D62] 2018 Unknown    Previous  delivery affecting pregnancy [O34.219] 2018 Yes      Problems Resolved During this Admission:    Diagnosis Date Noted Date Resolved POA        Labs: BMP: No results for input(s): GLU, NA, K, CL, CO2, BUN, CREATININE, CALCIUM, MG in the last 48 hours. and CBC No results for input(s): WBC, HGB, HCT, PLT in the last 48 hours.    Feeding Method: breast    Immunizations     Date Immunization Status Dose Route/Site Given by    18 1318 MMR Incomplete 0.5 mL Subcutaneous/Left deltoid     18 1318 Tdap Incomplete 0.5 mL Intramuscular/Left deltoid           Delivery:    Episiotomy: None   Lacerations:     Repair suture:     Repair # of packets: 7   Blood loss (ml): 0     Birth information:  YOB: 2018   Time of birth: 10:19 AM   Sex: male    Delivery type: , Low Transverse   Gestational Age: 39w2d    Delivery Clinician:      Other providers:       Additional  information:  Forceps:    Vacuum:    Breech:    Observed anomalies      Living?:           APGARS  One minute Five minutes Ten minutes   Skin color:         Heart rate:         Grimace:         Muscle tone:         Breathing:         Totals: 8  9        Placenta: Delivered:       appearance    Pending Diagnostic Studies:     Procedure Component Value Units Date/Time    Rubella antibody, IgG [[id number]] Collected:  18 1214    Order Status:  Sent Lab Status:  In process Updated:  180    Specimen:  Blood from Blood     Narrative:       Recoll. [id number] by [facility] at 2018 09:53, reason: specimen   hemolyzed, spoke with Danitza Marie          Discharged Condition: good    Disposition: Home or Self Care    Follow Up:  Follow-up Information     [facility]. Schedule an appointment as soon as possible for a visit in 6 weeks.    Specialty:  Obstetrics and Gynecology  Why:  for post partum visit  Contact information:  [address] [place] [address] [phone]               Patient Instructions:     Diet Adult Regular     Activity as tolerated     Other restrictions (specify):   Order Comments: Pelvic rest until follow up visit. Nothing in vagina -no sex, tampons, douching, etc.     Notify your health care provider if you experience any of the following:  temperature >100.4     Notify your health care provider if you experience any of the following:  persistent nausea and vomiting or diarrhea     Notify your health care provider if you experience any of the following:  severe uncontrolled pain     Notify your health care provider if you experience any of the following:  redness, tenderness, or signs of infection (pain, swelling, redness, odor or green/yellow discharge around incision site)     Notify your health care provider if you  experience any of the following:  difficulty breathing or increased cough     Notify your health care provider if you experience any of the following:  severe persistent headache     Notify your health care provider if you experience any of the following:  persistent dizziness, light-headedness, or visual disturbances     Notify your health care provider if you experience any of the following:   Order Comments: Heavy vaginal bleeding saturating more than 1 pad per hr for at least consecutive 2 hrs.     No dressing needed   Order Comments: Wash incision with soap/water daily, dry completely.     Call MD for:  temperature >100.4     Call MD for:  persistent nausea and vomiting or diarrhea     Call MD for:  severe uncontrolled pain     Call MD for:  redness, tenderness, or signs of infection (pain, swelling, redness, odor or green/yellow discharge around incision site)     Call MD for:  difficulty breathing or increased cough     Call MD for:  severe persistent headache     Call MD for:  increased confusion or weakness     Activity as tolerated     Lifting restrictions   Scheduling Instructions: Nothing heavier than gallon of milk for 2 weeks, no heavy lifting for 6 weeks.       Medications:  Current Discharge Medication List      START taking these medications    Details   docusate sodium (COLACE) 100 MG capsule Take 2 capsules (200 mg total) by mouth 2 (two) times daily.  Refills: 0      ferrous sulfate 325 (65 FE) MG EC tablet Take 1 tablet (325 mg total) by mouth once daily.  Refills: 0      hydrocodone-acetaminophen 5-325mg (NORCO) 5-325 mg per tablet Take 1 tablet by mouth every 4 (four) hours as needed.  Qty: 20 tablet, Refills: 0      ibuprofen (ADVIL,MOTRIN) 600 MG tablet Take 1 tablet (600 mg total) by mouth every 6 (six) hours.  Qty: 60 tablet, Refills: 2         CONTINUE these medications which have NOT CHANGED    Details   PRENATAL VIT CALC,IRON,FOLIC (PRENATAL VITAMIN ORAL) Take 1 tablet by mouth Daily.              GURVINDER Macedo MD  Obstetrics  [facility]   0

## 2020-08-11 NOTE — ED PROVIDER NOTE - OBJECTIVE STATEMENT
71yo M with PMH NICM s/p LVAD s/p heart transplant on 2/23/18 with coronary fistula, HCV+ s/p Rx, prior antibody mediated rejection s/p IVIG plasmapharesis/Rituximab, CKD, hemolytic anemia, sent in by cardiologist for abnormal labs yesterday. Pt reports he was called and told his Hgb is low. Went for routine blood work yesterday but pt also reporting LOBATO for several weeks. Denies any CP, palpitations, abd pain, n/v/d, BRBPR, melena, hematuria. Takes 81mg ASA daily, no other blood thinner use. 71yo M with PMH NICM s/p LVAD s/p heart transplant on 2/23/18 with coronary fistula, HCV+ s/p Rx, prior antibody mediated rejection s/p IVIG plasmapharesis/Rituximab, CKD, hemolytic anemia, sent in by cardiologist for abnormal labs yesterday. Pt reports he was called and told his Hgb is low. Went for routine blood work yesterday but pt also reporting LOBATO for several weeks. Denies any CP, palpitations, abd pain, n/v/d, BRBPR, melena, hematuria, fever/chills, cough. Takes 81mg ASA daily, no other blood thinner use. 71yo M with PMH NICM s/p LVAD s/p heart transplant on 2/23/18 with coronary fistula, HCV+ s/p Rx, prior antibody mediated rejection s/p IVIG plasmapharesis/Rituximab, CKD, DM, hemolytic anemia, sent in by cardiologist for abnormal labs yesterday. Pt reports he was called and told his Hgb is low. Went for routine blood work yesterday but pt also reporting LOBATO for several weeks. Denies any CP, palpitations, abd pain, n/v/d, BRBPR, melena, hematuria, fever/chills, cough. Takes 81mg ASA daily, no other blood thinner use.

## 2020-08-11 NOTE — ED ADULT NURSE NOTE - NSIMPLEMENTINTERV_GEN_ALL_ED
Implemented All Universal Safety Interventions:  Wise to call system. Call bell, personal items and telephone within reach. Instruct patient to call for assistance. Room bathroom lighting operational. Non-slip footwear when patient is off stretcher. Physically safe environment: no spills, clutter or unnecessary equipment. Stretcher in lowest position, wheels locked, appropriate side rails in place.

## 2020-08-11 NOTE — H&P ADULT - NSHPLABSRESULTS_GEN_ALL_CORE
T(C): 36.8 (08-11-20 @ 20:16), Max: 36.8 (08-11-20 @ 20:16)  T(F): 98.2 (08-11-20 @ 20:16), Max: 98.2 (08-11-20 @ 20:16)  HR: 115 (08-11-20 @ 20:16) (115 - 133)  BP: 126/99 (08-11-20 @ 20:16) (126/99 - 148/99)  RR: 20 (08-11-20 @ 20:16) (20 - 30)  SpO2: 98% (08-11-20 @ 20:16) (98% - 98%)                        6.4    2.90  )-----------( 185      ( 11 Aug 2020 18:24 )             20.7   08-11    140  |  102  |  26<H>  ----------------------------<  266<H>  4.3   |  24  |  1.47<H>    Ca    8.9      11 Aug 2020 19:15    TPro  5.6<L>  /  Alb  3.5  /  TBili  0.5  /  DBili  x   /  AST  30  /  ALT  30  /  AlkPhos  70  08-11

## 2020-08-11 NOTE — H&P ADULT - ASSESSMENT
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 Patient was treated w/ prednisone and Rituximab. Tacro was discontinued and patient was also transitioned to everolimus  Admitted  6/16-6/19  for hyperglycemia.  Reported to transplant team having one done black tarry stool-  instructed to  present to [facility] for anemia management. Patient has been following with Hematology outpatient.  Denies CP  N/V diarrhea SOB. 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 Patient was treated w/ prednisone and Rituximab. Tacro was discontinued and patient was also transitioned to everolimus  Admitted  6/16-6/19  for hyperglycemia.  Reported to transplant team having one done black tarry stool-  instructed to  present to [facility] for  Hemolytic anemia management. Patient has been following with Hematology outpatient.  Denies CP  N/V diarrhea SOB.

## 2020-08-12 DIAGNOSIS — D72.819 DECREASED WHITE BLOOD CELL COUNT, UNSPECIFIED: ICD-10-CM

## 2020-08-12 DIAGNOSIS — N18.3 CHRONIC KIDNEY DISEASE, STAGE 3 (MODERATE): ICD-10-CM

## 2020-08-12 DIAGNOSIS — D64.9 ANEMIA, UNSPECIFIED: ICD-10-CM

## 2020-08-12 DIAGNOSIS — Z94.1 HEART TRANSPLANT STATUS: ICD-10-CM

## 2020-08-12 LAB
A1C WITH ESTIMATED AVERAGE GLUCOSE RESULT: 7.1 % — HIGH (ref 4–5.6)
ALBUMIN SERPL ELPH-MCNC: 3.5 G/DL — SIGNIFICANT CHANGE UP (ref 3.3–5)
ALP SERPL-CCNC: 64 U/L — SIGNIFICANT CHANGE UP (ref 40–120)
ALT FLD-CCNC: 32 U/L — SIGNIFICANT CHANGE UP (ref 10–45)
ANION GAP SERPL CALC-SCNC: 11 MMOL/L — SIGNIFICANT CHANGE UP (ref 5–17)
APPEARANCE UR: CLEAR — SIGNIFICANT CHANGE UP
AST SERPL-CCNC: 40 U/L — SIGNIFICANT CHANGE UP (ref 10–40)
BASOPHILS # BLD AUTO: 0 K/UL — SIGNIFICANT CHANGE UP (ref 0–0.2)
BASOPHILS NFR BLD AUTO: 0 % — SIGNIFICANT CHANGE UP (ref 0–2)
BILIRUB SERPL-MCNC: 0.6 MG/DL — SIGNIFICANT CHANGE UP (ref 0.2–1.2)
BILIRUB UR-MCNC: NEGATIVE — SIGNIFICANT CHANGE UP
BUN SERPL-MCNC: 21 MG/DL — SIGNIFICANT CHANGE UP (ref 7–23)
CALCIUM SERPL-MCNC: 8.8 MG/DL — SIGNIFICANT CHANGE UP (ref 8.4–10.5)
CHLORIDE SERPL-SCNC: 107 MMOL/L — SIGNIFICANT CHANGE UP (ref 96–108)
CO2 SERPL-SCNC: 26 MMOL/L — SIGNIFICANT CHANGE UP (ref 22–31)
COLOR SPEC: YELLOW — SIGNIFICANT CHANGE UP
CREAT SERPL-MCNC: 1.2 MG/DL — SIGNIFICANT CHANGE UP (ref 0.5–1.3)
DAT C3-SP REAG RBC QL: NEGATIVE — SIGNIFICANT CHANGE UP
DAT POLY-SP REAG RBC QL: POSITIVE — SIGNIFICANT CHANGE UP
DIFF PNL FLD: ABNORMAL
DIRECT COOMBS IGG: POSITIVE — SIGNIFICANT CHANGE UP
EOSINOPHIL # BLD AUTO: 0 K/UL — SIGNIFICANT CHANGE UP (ref 0–0.5)
EOSINOPHIL NFR BLD AUTO: 0 % — SIGNIFICANT CHANGE UP (ref 0–6)
ESTIMATED AVERAGE GLUCOSE: 157 MG/DL — HIGH (ref 68–114)
FOLATE SERPL-MCNC: >20 NG/ML — SIGNIFICANT CHANGE UP
GLUCOSE BLDC GLUCOMTR-MCNC: 175 MG/DL — HIGH (ref 70–99)
GLUCOSE BLDC GLUCOMTR-MCNC: 233 MG/DL — HIGH (ref 70–99)
GLUCOSE BLDC GLUCOMTR-MCNC: 261 MG/DL — HIGH (ref 70–99)
GLUCOSE BLDC GLUCOMTR-MCNC: 279 MG/DL — HIGH (ref 70–99)
GLUCOSE SERPL-MCNC: 154 MG/DL — HIGH (ref 70–99)
GLUCOSE UR QL: ABNORMAL
HCT VFR BLD CALC: 25.4 % — LOW (ref 39–50)
HGB BLD-MCNC: 7.9 G/DL — LOW (ref 13–17)
KETONES UR-MCNC: NEGATIVE — SIGNIFICANT CHANGE UP
LEUKOCYTE ESTERASE UR-ACNC: ABNORMAL
LYMPHOCYTES # BLD AUTO: 0.32 K/UL — LOW (ref 1–3.3)
LYMPHOCYTES # BLD AUTO: 11.4 % — LOW (ref 13–44)
MCHC RBC-ENTMCNC: 31.1 GM/DL — LOW (ref 32–36)
MCHC RBC-ENTMCNC: 32.6 PG — SIGNIFICANT CHANGE UP (ref 27–34)
MCV RBC AUTO: 105 FL — HIGH (ref 80–100)
MONOCYTES # BLD AUTO: 0.1 K/UL — SIGNIFICANT CHANGE UP (ref 0–0.9)
MONOCYTES NFR BLD AUTO: 3.5 % — SIGNIFICANT CHANGE UP (ref 2–14)
MRSA PCR RESULT.: SIGNIFICANT CHANGE UP
NEUTROPHILS # BLD AUTO: 2.22 K/UL — SIGNIFICANT CHANGE UP (ref 1.8–7.4)
NEUTROPHILS NFR BLD AUTO: 70.2 % — SIGNIFICANT CHANGE UP (ref 43–77)
NITRITE UR-MCNC: NEGATIVE — SIGNIFICANT CHANGE UP
PH UR: 6.5 — SIGNIFICANT CHANGE UP (ref 5–8)
PLATELET # BLD AUTO: 157 K/UL — SIGNIFICANT CHANGE UP (ref 150–400)
POTASSIUM SERPL-MCNC: 4.4 MMOL/L — SIGNIFICANT CHANGE UP (ref 3.5–5.3)
POTASSIUM SERPL-SCNC: 4.4 MMOL/L — SIGNIFICANT CHANGE UP (ref 3.5–5.3)
PROT SERPL-MCNC: 5.8 G/DL — LOW (ref 6–8.3)
PROT UR-MCNC: ABNORMAL
RBC # BLD: 2.42 M/UL — LOW (ref 4.2–5.8)
RBC # FLD: 18.6 % — HIGH (ref 10.3–14.5)
S AUREUS DNA NOSE QL NAA+PROBE: SIGNIFICANT CHANGE UP
SODIUM SERPL-SCNC: 144 MMOL/L — SIGNIFICANT CHANGE UP (ref 135–145)
SP GR SPEC: 1.02 — SIGNIFICANT CHANGE UP (ref 1.01–1.02)
TACROLIMUS SERPL-MCNC: <2 NG/ML — SIGNIFICANT CHANGE UP
TACROLIMUS SERPL-MCNC: <2 NG/ML — SIGNIFICANT CHANGE UP
TSH SERPL-MCNC: 1.22 UIU/ML — SIGNIFICANT CHANGE UP (ref 0.27–4.2)
UROBILINOGEN FLD QL: NEGATIVE — SIGNIFICANT CHANGE UP
VIT B12 SERPL-MCNC: >2000 PG/ML — HIGH (ref 232–1245)
WBC # BLD: 2.84 K/UL — LOW (ref 3.8–10.5)
WBC # FLD AUTO: 2.84 K/UL — LOW (ref 3.8–10.5)

## 2020-08-12 PROCEDURE — 99223 1ST HOSP IP/OBS HIGH 75: CPT | Mod: GC

## 2020-08-12 PROCEDURE — 99232 SBSQ HOSP IP/OBS MODERATE 35: CPT

## 2020-08-12 PROCEDURE — 99222 1ST HOSP IP/OBS MODERATE 55: CPT | Mod: GC

## 2020-08-12 RX ORDER — OMEGA-3 ACID ETHYL ESTERS 1 G
2 CAPSULE ORAL
Refills: 0 | Status: DISCONTINUED | OUTPATIENT
Start: 2020-08-12 | End: 2020-08-14

## 2020-08-12 RX ORDER — EVEROLIMUS 10 MG/1
0.75 TABLET ORAL
Refills: 0 | Status: DISCONTINUED | OUTPATIENT
Start: 2020-08-12 | End: 2020-08-23

## 2020-08-12 RX ORDER — INSULIN LISPRO 100/ML
VIAL (ML) SUBCUTANEOUS
Refills: 0 | Status: DISCONTINUED | OUTPATIENT
Start: 2020-08-12 | End: 2020-08-14

## 2020-08-12 RX ORDER — GLUCAGON INJECTION, SOLUTION 0.5 MG/.1ML
1 INJECTION, SOLUTION SUBCUTANEOUS ONCE
Refills: 0 | Status: DISCONTINUED | OUTPATIENT
Start: 2020-08-12 | End: 2020-08-18

## 2020-08-12 RX ORDER — INSULIN LISPRO 100/ML
VIAL (ML) SUBCUTANEOUS AT BEDTIME
Refills: 0 | Status: DISCONTINUED | OUTPATIENT
Start: 2020-08-12 | End: 2020-08-14

## 2020-08-12 RX ORDER — SODIUM CHLORIDE 9 MG/ML
1000 INJECTION, SOLUTION INTRAVENOUS
Refills: 0 | Status: DISCONTINUED | OUTPATIENT
Start: 2020-08-12 | End: 2020-08-25

## 2020-08-12 RX ADMIN — MAGNESIUM OXIDE 400 MG ORAL TABLET 400 MILLIGRAM(S): 241.3 TABLET ORAL at 12:27

## 2020-08-12 RX ADMIN — Medication 1 TABLET(S): at 12:27

## 2020-08-12 RX ADMIN — Medication 2: at 12:27

## 2020-08-12 RX ADMIN — Medication 7 UNIT(S): at 12:27

## 2020-08-12 RX ADMIN — Medication 1: at 21:36

## 2020-08-12 RX ADMIN — VALGANCICLOVIR 450 MILLIGRAM(S): 450 TABLET, FILM COATED ORAL at 12:27

## 2020-08-12 RX ADMIN — SODIUM CHLORIDE 3 MILLILITER(S): 9 INJECTION INTRAMUSCULAR; INTRAVENOUS; SUBCUTANEOUS at 21:22

## 2020-08-12 RX ADMIN — PANTOPRAZOLE SODIUM 40 MILLIGRAM(S): 20 TABLET, DELAYED RELEASE ORAL at 07:13

## 2020-08-12 RX ADMIN — POSACONAZOLE 300 MILLIGRAM(S): 100 TABLET, DELAYED RELEASE ORAL at 12:27

## 2020-08-12 RX ADMIN — Medication 81 MILLIGRAM(S): at 12:27

## 2020-08-12 RX ADMIN — Medication 50 MILLIGRAM(S): at 08:22

## 2020-08-12 RX ADMIN — SODIUM CHLORIDE 3 MILLILITER(S): 9 INJECTION INTRAMUSCULAR; INTRAVENOUS; SUBCUTANEOUS at 13:06

## 2020-08-12 RX ADMIN — Medication 7 UNIT(S): at 19:08

## 2020-08-12 RX ADMIN — Medication 1000 UNIT(S): at 12:27

## 2020-08-12 RX ADMIN — INSULIN GLARGINE 14 UNIT(S): 100 INJECTION, SOLUTION SUBCUTANEOUS at 21:36

## 2020-08-12 RX ADMIN — EVEROLIMUS 0.75 MILLIGRAM(S): 10 TABLET ORAL at 01:33

## 2020-08-12 RX ADMIN — SODIUM CHLORIDE 3 MILLILITER(S): 9 INJECTION INTRAMUSCULAR; INTRAVENOUS; SUBCUTANEOUS at 07:12

## 2020-08-12 RX ADMIN — Medication 650 MILLIGRAM(S): at 18:48

## 2020-08-12 RX ADMIN — EVEROLIMUS 0.75 MILLIGRAM(S): 10 TABLET ORAL at 08:23

## 2020-08-12 RX ADMIN — Medication 1 MILLIGRAM(S): at 12:27

## 2020-08-12 RX ADMIN — Medication 20 MILLIGRAM(S): at 21:36

## 2020-08-12 RX ADMIN — Medication 2 GRAM(S): at 18:48

## 2020-08-12 RX ADMIN — Medication 650 MILLIGRAM(S): at 07:13

## 2020-08-12 RX ADMIN — EVEROLIMUS 0.75 MILLIGRAM(S): 10 TABLET ORAL at 19:59

## 2020-08-12 RX ADMIN — Medication 5 UNIT(S): at 08:23

## 2020-08-12 RX ADMIN — Medication 3: at 19:08

## 2020-08-12 NOTE — PROGRESS NOTE ADULT - SUBJECTIVE AND OBJECTIVE BOX
VITAL SIGNS    Telemetry:    Vital Signs Last 24 Hrs  T(C): 36.6 (20 @ 06:20), Max: 36.8 (20 @ 20:16)  T(F): 97.9 (20 @ 06:20), Max: 98.2 (20 @ 20:16)  HR: 107 (20 @ 06:20) (107 - 133)  BP: 139/92 (20 @ 06:20) (126/99 - 148/99)  RR: 18 (20 @ 06:20) (18 - 30)  SpO2: 98% (20 @ 06:20) (96% - 99%)             @ 07:01  -   @ 07:00  --------------------------------------------------------  IN: 570 mL / OUT: 600 mL / NET: -30 mL       Daily Height in cm: 172.72 (11 Aug 2020 21:20)    Daily Weight in k.8 (11 Aug 2020 21:20)  Admit Wt: Drug Dosing Weight  Height (cm): 172.7 (11 Aug 2020 21:20)  Weight (kg): 72.847 (11 Aug 2020 21:20)  BMI (kg/m2): 24.4 (11 Aug 2020 21:20)  BSA (m2): 1.86 (11 Aug 2020 21:20)     Daily Weight in k.8 (20 @ 21:20)    LABS      144  |  107  |  21  ----------------------------<  154<H>  4.4   |  26  |  1.20    Ca    8.8      12 Aug 2020 08:01    TPro  5.8<L>  /  Alb  3.5  /  TBili  0.6  /  DBili  x   /  AST  40  /  ALT  32  /  AlkPhos  64                                   7.9    2.84  )-----------( 157      ( 12 Aug 2020 08:01 )             25.4          PT/INR - ( 11 Aug 2020 22:28 )   PT: 12.2 sec;   INR: 1.03 ratio         PTT - ( 11 Aug 2020 22:28 )  PTT:25.3 sec        Bilirubin Total, Serum: 0.6 mg/dL ( @ 08:01)  Bilirubin Total, Serum: 0.6 mg/dL ( @ 22:28)  Bilirubin Total, Serum: 0.5 mg/dL ( @ 19:15)  Bilirubin Total, Serum: 0.5 mg/dL ( @ 18:24)    CAPILLARY BLOOD GLUCOSE      POCT Blood Glucose.: 175 mg/dL (12 Aug 2020 07:39)  POCT Blood Glucose.: 281 mg/dL (11 Aug 2020 23:23)  POCT Blood Glucose.: 249 mg/dL (11 Aug 2020 21:55)          Drains:     MS       [  ]   [  ]            L Pleural [  ]            R Pleural  [  ]            ZULEIMA  [  ]           Rosas  [  ]    Pacing Wires      [  ]   Settings:                                  Isolated  [  ]                    CXR:      MEDICATIONS  aspirin enteric coated 81 milliGRAM(s) Oral daily  cholecalciferol 1000 Unit(s) Oral daily  dextrose 40% Gel 15 Gram(s) Oral once PRN  dextrose 5%. 1000 milliLiter(s) IV Continuous <Continuous>  dextrose 50% Injectable 12.5 Gram(s) IV Push once  everolimus (ZORTRESS) 0.75 milliGRAM(s) Oral <User Schedule>  folic acid 1 milliGRAM(s) Oral daily  glucagon  Injectable 1 milliGRAM(s) IntraMuscular once PRN  insulin glargine Injectable (LANTUS) 14 Unit(s) SubCutaneous at bedtime  insulin lispro (HumaLOG) corrective regimen sliding scale   SubCutaneous three times a day before meals  insulin lispro (HumaLOG) corrective regimen sliding scale   SubCutaneous at bedtime  insulin lispro Injectable (HumaLOG) 5 Unit(s) SubCutaneous before breakfast  insulin lispro Injectable (HumaLOG) 7 Unit(s) SubCutaneous before lunch  insulin lispro Injectable (HumaLOG) 7 Unit(s) SubCutaneous before dinner  magnesium oxide 400 milliGRAM(s) Oral daily  omega-3-Acid Ethyl Esters 2 Gram(s) Oral two times a day  pantoprazole    Tablet 40 milliGRAM(s) Oral before breakfast  posaconazole DR Tablet 300 milliGRAM(s) Oral daily  pravastatin 20 milliGRAM(s) Oral at bedtime  predniSONE   Tablet 50 milliGRAM(s) Oral daily  sodium bicarbonate 650 milliGRAM(s) Oral two times a day  sodium chloride 0.9% lock flush 3 milliLiter(s) IV Push every 8 hours  trimethoprim   80 mG/sulfamethoxazole 400 mG 1 Tablet(s) Oral daily  valGANciclovir 450 milliGRAM(s) Oral daily      PHYSICAL EXAM      Neurology: alert and oriented x 3, nonfocal, no gross deficits  CV :S1S2  Sternal Wound :  CDI , Stable  Lungs: cta  Abdomen: soft, nontender, nondistended, positive bowel sounds, last bowel movement   :  voids     Extremities:   warm to touch               PAST MEDICAL & SURGICAL HISTORY:  H/O hemolytic anemia  H/O autoimmune hemolytic anemia  Knee pain, right  HLD (hyperlipidemia)  Former smoker  DVT of upper extremity (deep vein thrombosis)  Hepatitis C virus  GIB (gastrointestinal bleeding)  Ventricular fibrillation: s/p AICD  PAF (paroxysmal atrial fibrillation): on xarelto  Non-Ischemic Cardiomyopathy  SVT (Supraventricular Tachycardia)  HTN  CHF (Congestive Heart Failure)  S/P right heart catheterization: biopsy multiple  H/O heart transplant: 2018  Status post left hip replacement  History of Prior Ablation Treatment: for afib  AICD (Automatic Cardioverter/Defibrillator) Present: [employer] with 1 [employer] [place] lead09- explanted and replaced with Medtronic 2 leads on 09                 Discussed with Cardiothoracic Team at AM rounds.

## 2020-08-12 NOTE — PROGRESS NOTE ADULT - ASSESSMENT
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 Patient was treated w/ prednisone and Rituximab. Tacro was discontinued and patient was also transitioned to everolimus  Admitted  6/16-6/19  for hyperglycemia.  Reported to transplant team having one done black tarry stool-  instructed to  present to [facility] for  Hemolytic anemia management. Patient has been following with Hematology outpatient.  Denies CP  N/V diarrhea SOB.  8/12: urine cx sent This is a 70y Male w/ NICM s/p HM2 s/p OHT on 2/23/18 with coronary fistula, HCV+ s/p Rx, prior antibody mediated rejection s/p IVIG plasmapharesis /Rituximab, CKD (baseline Cr 1.4), admission for hemolytic anemia of unclear etiology from 4/29-5/7. Patient was treated w/ prednisone and Rituximab. Tacro was discontinued and patient was also transitioned to everolimus  Admitted  6/16-6/19  for hyperglycemia.  Reported to transplant team having one done black tarry stool-  instructed to  present to [facility] for  Hemolytic anemia management. Patient has been following with Hematology outpatient.  Denies CP  N/V diarrhea SOB.  8/12: urine cx sent Hematology and GI called. Pt prednisone was lowered to 50 from 60 mg by HF team this am. Pt is a very difficult stick and phelbotomy asked to attempt to draw. Bloodcx requested per hematology.

## 2020-08-12 NOTE — CONSULT NOTE ADULT - ASSESSMENT
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 Of note, pt previously w/ mult admissions for hemolytic anemia. W/u unrevealing and etiology remains unclear, although some thought that it be related to immunomodulators. 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 He has responded appropriately to a single blood transfusion. Of note, he recently has developed dark colored stools. Will investigate if anemia is due to GI bleeding in setting of steroid use or hemolysis and manage appropriately.    Review of pertinent studies  8/2020 labs: Direct Jacky +, 4.6% reticulocytes with low RI, normal bilirubin,  (stable). Haptoglobin pending.

## 2020-08-12 NOTE — CONSULT NOTE ADULT - ASSESSMENT
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   -Currently no evidence of hemolysis although Jacky IgG positive, C3 negative   -presented with black stool for past few days  -Indirect bilirubin normal   -8/10 labs outpatient showing LDH elevated but stable, Tbili 0.7, retic mildly increased to 4.6%   -most likely anemia related to GI bleed, blood loss given labs and history   -stool guaic   -GI consult for possible GI bleed  -would recommend switching to IV protonix   -Please taper steroid to 40mg daily, will taper by 10mg every 10 days   -send haptoglobin, LDH, retic, B12 and folic acid level    -continue folic acid supplementation    #Dysuria with bandemia   -complained of dysuria last few days with 20% bandemia on admission   -UA positive   -Send urine and blood cultures   -low threshold to start antibiotic if spikes fever as immunocompromised      Johnnie Whitten MD   Hematology/Oncology Fellow   [phone]  Please page on call fellow after 5pm and on weekends

## 2020-08-12 NOTE — CONSULT NOTE ADULT - PROBLEM SELECTOR RECOMMENDATION 2
-Pt s/p 1u pRBC this AM  -C/w trend CBC  -Would re-call hematology given hx of hemolytic anemia  -Would c/s GI given anemia and reports of dark/tarry stools  -Check FOBT  -Check basic anemia labs retic count, LDH, hapto, direct Coomb's, Fe studies, B12, folate, MMA  -C/w PDN 50mg daily for now - will follow up rest of hemloysis labs. please also check iron studies and FOBT  - hematology consulted regading management of possibly recurrent hemolytic anemia. continue prednisone 60 mg daily at this time  - consult GI for consideration of EGD

## 2020-08-12 NOTE — CONSULT NOTE ADULT - SUBJECTIVE AND OBJECTIVE BOX
Patient seen and evaluated at bedside    Chief Complaint: Anemia    Briefly, 69 y/o M w/ PMH of s/p OHT w/ coronary fistula (2018), HCV s/p tx, prior Ab mediated rejection s/p plasmapharesis and rituximab, and reucrrent admissions for hemolytic anemia referred by cardiologist for anemia. Of note, pt previously w/ mult admissions for hemolytic anemia. W/u unrevealing and etiology remains unclear, although some thought that it be related to immunomodulators. He has been tx w/ steroids and his immunosuppressive agents have been adjusted. While at clinic f/u, again noted to have anemia and was referred for xfusions and further eval. Pt denies any sxs incl CP/dyspnea/palps. No presyncope/syncope. No fatigue. No LOBATO. He does report one episode of black/tarry stool, but no joshua blood loss from GI tract.    Primary team HPI:  This is a 70y Male w/ NICM s/p HM2 s/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 Patient was treated w/ prednisone and Rituximab. Tacro was discontinued and patient was also transitioned to everolimus  Admitted  6/16-6/19  for hyperglycemia.  Reported to transplant team having one done black tarry stool-  instructed to  present to [facility] for hemolytic anemia. Patient has been following with Hematology outpatient.  Denies CP  N/V diarrhea SOB. (11 Aug 2020 20:08)      PMHx:   H/O hemolytic anemia  H/O autoimmune hemolytic anemia  Knee pain, right  HLD (hyperlipidemia)  Former smoker  DVT of upper extremity (deep vein thrombosis)  Hepatitis C virus  GIB (gastrointestinal bleeding)  H/O prior ablation treatment  Ventricular fibrillation  PAF (paroxysmal atrial fibrillation)  Non-Ischemic Cardiomyopathy  SVT (Supraventricular Tachycardia)  HTN  CHF (Congestive Heart Failure)      PSHx:   S/P right heart catheterization  H/O heart transplant  LVAD (left ventricular assist device) present  Status post left hip replacement  Hypertension  Hypertension  History of Prior Ablation Treatment  AICD (Automatic Cardioverter/Defibrillator) Present      Allergies:  No Known Allergies      Home Meds: Reviewed    Current Medications:   aspirin enteric coated 81 milliGRAM(s) Oral daily  cholecalciferol 1000 Unit(s) Oral daily  dextrose 40% Gel 15 Gram(s) Oral once PRN  dextrose 5%. 1000 milliLiter(s) IV Continuous <Continuous>  dextrose 50% Injectable 12.5 Gram(s) IV Push once  everolimus (ZORTRESS) 0.75 milliGRAM(s) Oral <User Schedule>  folic acid 1 milliGRAM(s) Oral daily  glucagon  Injectable 1 milliGRAM(s) IntraMuscular once PRN  insulin glargine Injectable (LANTUS) 14 Unit(s) SubCutaneous at bedtime  insulin lispro (HumaLOG) corrective regimen sliding scale   SubCutaneous three times a day before meals  insulin lispro (HumaLOG) corrective regimen sliding scale   SubCutaneous at bedtime  insulin lispro Injectable (HumaLOG) 5 Unit(s) SubCutaneous before breakfast  insulin lispro Injectable (HumaLOG) 7 Unit(s) SubCutaneous before lunch  insulin lispro Injectable (HumaLOG) 7 Unit(s) SubCutaneous before dinner  magnesium oxide 400 milliGRAM(s) Oral daily  omega-3-Acid Ethyl Esters 2 Gram(s) Oral two times a day  pantoprazole    Tablet 40 milliGRAM(s) Oral before breakfast  posaconazole DR Tablet 300 milliGRAM(s) Oral daily  pravastatin 20 milliGRAM(s) Oral at bedtime  predniSONE   Tablet 50 milliGRAM(s) Oral daily  sodium bicarbonate 650 milliGRAM(s) Oral two times a day  sodium chloride 0.9% lock flush 3 milliLiter(s) IV Push every 8 hours  trimethoprim   80 mG/sulfamethoxazole 400 mG 1 Tablet(s) Oral daily  valGANciclovir 450 milliGRAM(s) Oral daily      FAMILY HISTORY:  No pertinent family history in first degree relatives      Social History:  Smoking History: Denies  Alcohol Use: Denies  Drug Use: Denies    REVIEW OF SYSTEMS:  CONSTITUTIONAL: No weakness, fevers or chills  EYES/ENT: No visual changes;  No dysphagia  NECK: No pain or stiffness  RESPIRATORY: No cough, wheezing, hemoptysis; No shortness of breath  CARDIOVASCULAR: No chest pain or palpitations; No lower extremity edema  GASTROINTESTINAL: +Dark stools  BACK: No back pain  GENITOURINARY: No dysuria, frequency or hematuria  NEUROLOGICAL: No numbness or weakness  SKIN: No itching, burning, rashes, or lesions   All other review of systems is negative unless indicated above.    Physical Exam:  T(F): 97.9 (08-12), Max: 98.2 (08-11)  HR: 107 (08-12) (107 - 133)  BP: 139/92 (08-12) (126/99 - 148/99)  RR: 18 (08-12)  SpO2: 98% (08-12)  Gen: NAD.  HEENT: NCAT. PERRLA b/l.  Neck: No JVP elev.  CV: Normal S1, S2. RRR. Continuous murmur.  Chest: CTAB. No WRR.  Abd: +BSx4. Soft. NTND.  Ext: No LE edema.  Skin: No cyanosis.    Cardiovascular Diagnostic Testing:    ECG: Personally reviewed: RBBB and poss LPFB.    Echo: Personally reviewed: < from: Transthoracic Echocardiogram (05.01.20 @ 13:36) >  1. Normal left ventricular internal dimensions and wall  thicknesses.  2. Normal left ventricular systolic function. Septal motion  consistent with cardiac surgery.  3. Normal right ventricular size and function.    Stress Testing: < from: Stress Echo Pharmacologic (w/TTE, w/Cont) (02.20.20 @ 12:10) >  1. Normal hemodynamic response.  2. Normal electrocardiographic response.  3.Normal augmentation in left ventricular systolic  function.  4. No evidence of inducible ischemia on pharmacologic  stress echocardiogram images.    Imaging:    CXR: Personally reviewed. Unremarkable.    Labs: Personally reviewed                        7.9    2.84  )-----------( 157      ( 12 Aug 2020 08:01 )             25.4     08-12    144  |  107  |  21  ----------------------------<  154<H>  4.4   |  26  |  1.20    Ca    8.8      12 Aug 2020 08:01    TPro  5.8<L>  /  Alb  3.5  /  TBili  0.6  /  DBili  x   /  AST  40  /  ALT  32  /  AlkPhos  64  08-12    PT/INR - ( 11 Aug 2020 22:28 )   PT: 12.2 sec;   INR: 1.03 ratio         PTT - ( 11 Aug 2020 22:28 )  PTT:25.3 sec          Serum Pro-Brain Natriuretic Peptide: 736 pg/mL (08-11 @ 22:28)

## 2020-08-12 NOTE — CONSULT NOTE ADULT - SUBJECTIVE AND OBJECTIVE BOX
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 Patient was treated w/ prednisone and Rituximab. Tacro was discontinued and patient was also transitioned to everolimus  Admitted  6/16-6/19  for hyperglycemia.    Patient diagnosed in 5/2020 when admitted for abdominal and chest pain, found to be anemic and Jacky IgG Ab+, also had elevated Tbili/LDH and low haptoglobin consistent with hemolysis.  Patient was treated with steroids. Admitted at the end of May due to drop in hemoglobin and management of AIHA, difficult line placement at [facility]. 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 Given his LDH and haptoglobin normal along with BMBx on 5/27 not indicating underlying BM disorder C2 rituximab held as unlikely active hemolysis. Continued on steroid and discharged to follow up outpatient. He has since been compliant with his medication. Tapered his steroid to 50mg daily. His tacrolimus was changed to everolimus. His hemolysis labs have been stable outpatient, last visit with Dr. Isaacs on 7/13/20. Peripheral smear at that time showing no microspherocytes, few riticulocytes, no schistocytes. His labs from 8/10 outpatient showing drop in hemoglobin in 6.6, Retic slightly higher at 4.6%, LDH stable at 590. He had worsening chest tightness and shortness of breath on exertion so his cardiologist sent him to the ER. Patient now s/p 1 unit PRBC, responded to 7.9 hb. States he feels his chest tightness and sob have improved with blood transfusion.     PAST MEDICAL & SURGICAL HISTORY:  H/O hemolytic anemia  H/O autoimmune hemolytic anemia  Knee pain, right  HLD (hyperlipidemia)  Former smoker  DVT of upper extremity (deep vein thrombosis)  Hepatitis C virus  GIB (gastrointestinal bleeding)  Ventricular fibrillation: s/p AICD  PAF (paroxysmal atrial fibrillation): on xarelto  Non-Ischemic Cardiomyopathy  SVT (Supraventricular Tachycardia)  HTN  CHF (Congestive Heart Failure)  S/P right heart catheterization: biopsy multiple  H/O heart transplant: 2/2018  Status post left hip replacement  History of Prior Ablation Treatment: for afib  AICD (Automatic Cardioverter/Defibrillator) Present: [employer] with 1 [employer] lead4/1/09- explanted and replaced with Medtronic 2 leads on 9/2/09      Allergies    No Known Allergies    Intolerances        MEDICATIONS  (STANDING):  aspirin enteric coated 81 milliGRAM(s) Oral daily  cholecalciferol 1000 Unit(s) Oral daily  dextrose 5%. 1000 milliLiter(s) (50 mL/Hr) IV Continuous <Continuous>  dextrose 50% Injectable 12.5 Gram(s) IV Push once  everolimus (ZORTRESS) 0.75 milliGRAM(s) Oral <User Schedule>  folic acid 1 milliGRAM(s) Oral daily  insulin glargine Injectable (LANTUS) 14 Unit(s) SubCutaneous at bedtime  insulin lispro (HumaLOG) corrective regimen sliding scale   SubCutaneous three times a day before meals  insulin lispro (HumaLOG) corrective regimen sliding scale   SubCutaneous at bedtime  insulin lispro Injectable (HumaLOG) 5 Unit(s) SubCutaneous before breakfast  insulin lispro Injectable (HumaLOG) 7 Unit(s) SubCutaneous before lunch  insulin lispro Injectable (HumaLOG) 7 Unit(s) SubCutaneous before dinner  magnesium oxide 400 milliGRAM(s) Oral daily  omega-3-Acid Ethyl Esters 2 Gram(s) Oral two times a day  pantoprazole    Tablet 40 milliGRAM(s) Oral before breakfast  posaconazole DR Tablet 300 milliGRAM(s) Oral daily  pravastatin 20 milliGRAM(s) Oral at bedtime  predniSONE   Tablet 50 milliGRAM(s) Oral daily  sodium bicarbonate 650 milliGRAM(s) Oral two times a day  sodium chloride 0.9% lock flush 3 milliLiter(s) IV Push every 8 hours  trimethoprim   80 mG/sulfamethoxazole 400 mG 1 Tablet(s) Oral daily  valGANciclovir 450 milliGRAM(s) Oral daily    MEDICATIONS  (PRN):  dextrose 40% Gel 15 Gram(s) Oral once PRN Blood Glucose LESS THAN 70 milliGRAM(s)/deciLiter  glucagon  Injectable 1 milliGRAM(s) IntraMuscular once PRN Glucose LESS THAN 70 milligrams/deciliter      FAMILY HISTORY:  No pertinent family history in first degree relatives      SOCIAL HISTORY: No EtOH, no tobacco    REVIEW OF SYSTEMS:    CONSTITUTIONAL: No weakness, fevers or chills  EYES/ENT: No visual changes;  No vertigo or throat pain   NECK: No pain or stiffness  RESPIRATORY: No cough, wheezing, hemoptysis; No shortness of breath  CARDIOVASCULAR: No chest pain or palpitations  GASTROINTESTINAL: No abdominal or epigastric pain. No nausea, vomiting, or hematemesis; No diarrhea or constipation. No melena or hematochezia.  GENITOURINARY: No dysuria, frequency or hematuria  NEUROLOGICAL: No numbness or weakness  SKIN: No itching, burning, rashes, or lesions   All other review of systems is negative unless indicated above.    Height (cm): 172.7 (08-11 @ 21:20)  Weight (kg): 72.847 (08-11 @ 21:20)  BMI (kg/m2): 24.4 (08-11 @ 21:20)  BSA (m2): 1.86 (08-11 @ 21:20)    T(F): 98 (08-12-20 @ 13:30), Max: 98.2 (08-11-20 @ 20:16)  HR: 119 (08-12-20 @ 13:30)  BP: 140/90 (08-12-20 @ 13:30)  RR: 18 (08-12-20 @ 13:30)  SpO2: 98% (08-12-20 @ 13:30)  Wt(kg): --    GENERAL: NAD, well-developed  HEAD:  Atraumatic, Normocephalic  EYES: EOMI, PERRLA, conjunctiva and sclera clear  NECK: Supple, No JVD  CHEST/LUNG: Clear to auscultation bilaterally; No wheeze  HEART: Regular rate and rhythm; No murmurs, rubs, or gallops  ABDOMEN: Soft, Nontender, Nondistended; Bowel sounds present  EXTREMITIES:  2+ Peripheral Pulses, No clubbing, cyanosis, or edema  NEUROLOGY: non-focal  SKIN: No rashes or lesions                          7.9    2.84  )-----------( 157      ( 12 Aug 2020 08:01 )             25.4       08-12    144  |  107  |  21  ----------------------------<  154<H>  4.4   |  26  |  1.20    Ca    8.8      12 Aug 2020 08:01    TPro  5.8<L>  /  Alb  3.5  /  TBili  0.6  /  DBili  x   /  AST  40  /  ALT  32  /  AlkPhos  64  08-12          PT/INR - ( 11 Aug 2020 22:28 )   PT: 12.2 sec;   INR: 1.03 ratio         PTT - ( 11 Aug 2020 22:28 )  PTT:25.3 sec    .Blood Blood-Peripheral  06-17 @ 16:59   No Growth Final  --  --      .Blood Blood-Peripheral  06-17 @ 14:19   No Growth Final  --  --      .Blood  05-27 @ 10:16   Babesia microti PCR  Results: NOT detected  ***************Result Note*************  The detection of Babesia microti by PCR has only been  validated for whole blood; this test has not been approved  by the US Food and Drug Administration (FDA). Performance  characteristics of this assay have been determined by  [employer]. The clinical significance  of results should be considered in conjunction with the  overall clinical presentation of the patient. Result is not  intended to be used as the sole means for clinical diagnosis  or patient management decisions.  One negative sample does not necessarily rule  out the presence of a parasitic infection.  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 Patient was treated w/ prednisone and Rituximab. Tacro was discontinued and patient was also transitioned to everolimus  Admitted  6/16-6/19  for hyperglycemia.    Patient diagnosed in 5/2020 when admitted for abdominal and chest pain, found to be anemic and Jacky IgG Ab+, also had elevated Tbili/LDH and low haptoglobin consistent with hemolysis.  Patient was treated with steroids. Admitted at the end of May due to drop in hemoglobin and management of AIHA, difficult line placement at [facility]. 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 Given his LDH and haptoglobin normal along with BMBx on 5/27 not indicating underlying BM disorder C2 rituximab held as unlikely active hemolysis. Continued on steroid and discharged to follow up outpatient. He has since been compliant with his medication. Tapered his steroid to 50mg daily. His tacrolimus was changed to everolimus. His hemolysis labs have been stable outpatient, last visit with Dr. Isaacs on 7/13/20. Peripheral smear at that time showing no microspherocytes, few riticulocytes, no schistocytes. His labs from 8/10 outpatient showing drop in hemoglobin in 6.6, Retic slightly higher at 4.6%, LDH stable at 590. He had worsening chest tightness and shortness of breath on exertion so his cardiologist sent him to the ER. Patient now s/p 1 unit PRBC, responded to 7.9 hb. States he feels his chest tightness and sob have improved with blood transfusion. He did have some dysuria the a few days ago but now no longer. Does state he has been having dark stools the past few days, described as black.   Of note advised to follow up with GI outpatient for EGD (gastric mural thickening on CT) however did not. States had colonoscopy a year or so ago, not sure of the results, presumed normal.     PAST MEDICAL & SURGICAL HISTORY:  H/O hemolytic anemia  H/O autoimmune hemolytic anemia  Knee pain, right  HLD (hyperlipidemia)  Former smoker  DVT of upper extremity (deep vein thrombosis)  Hepatitis C virus  GIB (gastrointestinal bleeding)  Ventricular fibrillation: s/p AICD  PAF (paroxysmal atrial fibrillation): on xarelto  Non-Ischemic Cardiomyopathy  SVT (Supraventricular Tachycardia)  HTN  CHF (Congestive Heart Failure)  S/P right heart catheterization: biopsy multiple  H/O heart transplant: 2/2018  Status post left hip replacement  History of Prior Ablation Treatment: for afib  AICD (Automatic Cardioverter/Defibrillator) Present: [employer] with 1 [employer] lead4/1/09- explanted and replaced with Medtronic 2 leads on 9/2/09      Allergies    No Known Allergies    Intolerances        MEDICATIONS  (STANDING):  aspirin enteric coated 81 milliGRAM(s) Oral daily  cholecalciferol 1000 Unit(s) Oral daily  dextrose 5%. 1000 milliLiter(s) (50 mL/Hr) IV Continuous <Continuous>  dextrose 50% Injectable 12.5 Gram(s) IV Push once  everolimus (ZORTRESS) 0.75 milliGRAM(s) Oral <User Schedule>  folic acid 1 milliGRAM(s) Oral daily  insulin glargine Injectable (LANTUS) 14 Unit(s) SubCutaneous at bedtime  insulin lispro (HumaLOG) corrective regimen sliding scale   SubCutaneous three times a day before meals  insulin lispro (HumaLOG) corrective regimen sliding scale   SubCutaneous at bedtime  insulin lispro Injectable (HumaLOG) 5 Unit(s) SubCutaneous before breakfast  insulin lispro Injectable (HumaLOG) 7 Unit(s) SubCutaneous before lunch  insulin lispro Injectable (HumaLOG) 7 Unit(s) SubCutaneous before dinner  magnesium oxide 400 milliGRAM(s) Oral daily  omega-3-Acid Ethyl Esters 2 Gram(s) Oral two times a day  pantoprazole    Tablet 40 milliGRAM(s) Oral before breakfast  posaconazole DR Tablet 300 milliGRAM(s) Oral daily  pravastatin 20 milliGRAM(s) Oral at bedtime  predniSONE   Tablet 50 milliGRAM(s) Oral daily  sodium bicarbonate 650 milliGRAM(s) Oral two times a day  sodium chloride 0.9% lock flush 3 milliLiter(s) IV Push every 8 hours  trimethoprim   80 mG/sulfamethoxazole 400 mG 1 Tablet(s) Oral daily  valGANciclovir 450 milliGRAM(s) Oral daily    MEDICATIONS  (PRN):  dextrose 40% Gel 15 Gram(s) Oral once PRN Blood Glucose LESS THAN 70 milliGRAM(s)/deciLiter  glucagon  Injectable 1 milliGRAM(s) IntraMuscular once PRN Glucose LESS THAN 70 milligrams/deciliter      FAMILY HISTORY:  No pertinent family history in first degree relatives      SOCIAL HISTORY: No EtOH, no tobacco    REVIEW OF SYSTEMS:    CONSTITUTIONAL: No weakness, fevers or chills  EYES/ENT: No visual changes;  No vertigo or throat pain   NECK: No pain or stiffness  RESPIRATORY: No cough, wheezing, hemoptysis; No shortness of breath  CARDIOVASCULAR: No chest pain or palpitations  GASTROINTESTINAL: No abdominal or epigastric pain. No nausea, vomiting, or hematemesis; No diarrhea or constipation. No melena or hematochezia.  GENITOURINARY: No dysuria, frequency or hematuria  NEUROLOGICAL: No numbness or weakness  SKIN: No itching, burning, rashes, or lesions   All other review of systems is negative unless indicated above.    Height (cm): 172.7 (08-11 @ 21:20)  Weight (kg): 72.847 (08-11 @ 21:20)  BMI (kg/m2): 24.4 (08-11 @ 21:20)  BSA (m2): 1.86 (08-11 @ 21:20)    T(F): 98 (08-12-20 @ 13:30), Max: 98.2 (08-11-20 @ 20:16)  HR: 119 (08-12-20 @ 13:30)  BP: 140/90 (08-12-20 @ 13:30)  RR: 18 (08-12-20 @ 13:30)  SpO2: 98% (08-12-20 @ 13:30)  Wt(kg): --    GENERAL: NAD, well-developed  HEAD:  Atraumatic, Normocephalic  EYES: EOMI, PERRLA, conjunctiva and sclera clear  NECK: Supple, No JVD  CHEST/LUNG: Clear to auscultation bilaterally; No wheeze  HEART: Regular rate and rhythm; No murmurs, rubs, or gallops  ABDOMEN: Soft, Nontender, Nondistended; Bowel sounds present  EXTREMITIES:  2+ Peripheral Pulses, No clubbing, cyanosis, or edema  NEUROLOGY: non-focal  SKIN: No rashes or lesions                          7.9    2.84  )-----------( 157      ( 12 Aug 2020 08:01 )             25.4       08-12    144  |  107  |  21  ----------------------------<  154<H>  4.4   |  26  |  1.20    Ca    8.8      12 Aug 2020 08:01    TPro  5.8<L>  /  Alb  3.5  /  TBili  0.6  /  DBili  x   /  AST  40  /  ALT  32  /  AlkPhos  64  08-12          PT/INR - ( 11 Aug 2020 22:28 )   PT: 12.2 sec;   INR: 1.03 ratio         PTT - ( 11 Aug 2020 22:28 )  PTT:25.3 sec    .Blood Blood-Peripheral  06-17 @ 16:59   No Growth Final  --  --      .Blood Blood-Peripheral  06-17 @ 14:19   No Growth Final  --  --      .Blood  05-27 @ 10:16   Babesia microti PCR  Results: NOT detected  ***************Result Note*************  The detection of Babesia microti by PCR has only been  validated for whole blood; this test has not been approved  by the US Food and Drug Administration (FDA). Performance  characteristics of this assay have been determined by  [employer]. The clinical significance  of results should be considered in conjunction with the  overall clinical presentation of the patient. Result is not  intended to be used as the sole means for clinical diagnosis  or patient management decisions.  One negative sample does not necessarily rule  out the presence of a parasitic infection.  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     Hemolytic Anemia History:   Patient diagnosed in 5/2020 when admitted for abdominal and chest pain, found to be anemic and Jacky IgG Ab+, also had elevated Tbili/LDH and low haptoglobin consistent with hemolysis.  Patient was treated with steroids. Admitted at the end of May due to drop in hemoglobin and management of AIHA, difficult line placement at [facility]. 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 Given his LDH and haptoglobin normal along with BMBx on 5/27 not indicating underlying BM disorder C2 rituximab held as unlikely active hemolysis. Continued on steroid and discharged to follow up outpatient. He has since been compliant with his medication. Tapered his steroid to 50mg daily. His tacrolimus was changed to everolimus. His hemolysis labs have been stable outpatient, last visit with Dr. Isaacs on 7/13/20. Peripheral smear at that time showing no microspherocytes, few riticulocytes, no schistocytes. His labs from 8/10 outpatient showing drop in hemoglobin in 6.6, Retic slightly higher at 4.6%, LDH stable at 590. He had worsening chest tightness and shortness of breath on exertion so his cardiologist sent him to the ER. Patient now s/p 1 unit PRBC, responded to 7.9 hb. States he feels his chest tightness and sob have improved with blood transfusion. He did have some dysuria the a few days ago but now no longer. Does state he has been having dark stools the past few days, described as black.   Of note advised to follow up with GI outpatient for EGD (gastric mural thickening on CT) however did not. States had colonoscopy a year or so ago, not sure of the results, presumed normal.     PAST MEDICAL & SURGICAL HISTORY:  H/O hemolytic anemia  H/O autoimmune hemolytic anemia  Knee pain, right  HLD (hyperlipidemia)  Former smoker  DVT of upper extremity (deep vein thrombosis)  Hepatitis C virus  GIB (gastrointestinal bleeding)  Ventricular fibrillation: s/p AICD  PAF (paroxysmal atrial fibrillation): on xarelto  Non-Ischemic Cardiomyopathy  SVT (Supraventricular Tachycardia)  HTN  CHF (Congestive Heart Failure)  S/P right heart catheterization: biopsy multiple  H/O heart transplant: 2/2018  Status post left hip replacement  History of Prior Ablation Treatment: for afib  AICD (Automatic Cardioverter/Defibrillator) Present: [employer] with 1 [employer] lead4/1/09- explanted and replaced with [employer] 2 leads on 9/2/09      Allergies    No Known Allergies    Intolerances        MEDICATIONS  (STANDING):  aspirin enteric coated 81 milliGRAM(s) Oral daily  cholecalciferol 1000 Unit(s) Oral daily  dextrose 5%. 1000 milliLiter(s) (50 mL/Hr) IV Continuous <Continuous>  dextrose 50% Injectable 12.5 Gram(s) IV Push once  everolimus (ZORTRESS) 0.75 milliGRAM(s) Oral <User Schedule>  folic acid 1 milliGRAM(s) Oral daily  insulin glargine Injectable (LANTUS) 14 Unit(s) SubCutaneous at bedtime  insulin lispro (HumaLOG) corrective regimen sliding scale   SubCutaneous three times a day before meals  insulin lispro (HumaLOG) corrective regimen sliding scale   SubCutaneous at bedtime  insulin lispro Injectable (HumaLOG) 5 Unit(s) SubCutaneous before breakfast  insulin lispro Injectable (HumaLOG) 7 Unit(s) SubCutaneous before lunch  insulin lispro Injectable (HumaLOG) 7 Unit(s) SubCutaneous before dinner  magnesium oxide 400 milliGRAM(s) Oral daily  omega-3-Acid Ethyl Esters 2 Gram(s) Oral two times a day  pantoprazole    Tablet 40 milliGRAM(s) Oral before breakfast  posaconazole DR Tablet 300 milliGRAM(s) Oral daily  pravastatin 20 milliGRAM(s) Oral at bedtime  predniSONE   Tablet 50 milliGRAM(s) Oral daily  sodium bicarbonate 650 milliGRAM(s) Oral two times a day  sodium chloride 0.9% lock flush 3 milliLiter(s) IV Push every 8 hours  trimethoprim   80 mG/sulfamethoxazole 400 mG 1 Tablet(s) Oral daily  valGANciclovir 450 milliGRAM(s) Oral daily    MEDICATIONS  (PRN):  dextrose 40% Gel 15 Gram(s) Oral once PRN Blood Glucose LESS THAN 70 milliGRAM(s)/deciLiter  glucagon  Injectable 1 milliGRAM(s) IntraMuscular once PRN Glucose LESS THAN 70 milligrams/deciliter      FAMILY HISTORY:  No pertinent family history in first degree relatives      SOCIAL HISTORY: No EtOH, no tobacco    REVIEW OF SYSTEMS:    CONSTITUTIONAL: No weakness, fevers or chills  EYES/ENT: No visual changes;  No vertigo or throat pain   NECK: No pain or stiffness  RESPIRATORY: No cough, wheezing, hemoptysis; No shortness of breath  CARDIOVASCULAR: No chest pain or palpitations  GASTROINTESTINAL: No abdominal or epigastric pain. No nausea, vomiting, or hematemesis; No diarrhea or constipation. No melena or hematochezia.  GENITOURINARY: No dysuria, frequency or hematuria  NEUROLOGICAL: No numbness or weakness  SKIN: No itching, burning, rashes, or lesions   All other review of systems is negative unless indicated above.    Height (cm): 172.7 (08-11 @ 21:20)  Weight (kg): 72.847 (08-11 @ 21:20)  BMI (kg/m2): 24.4 (08-11 @ 21:20)  BSA (m2): 1.86 (08-11 @ 21:20)    T(F): 98 (08-12-20 @ 13:30), Max: 98.2 (08-11-20 @ 20:16)  HR: 119 (08-12-20 @ 13:30)  BP: 140/90 (08-12-20 @ 13:30)  RR: 18 (08-12-20 @ 13:30)  SpO2: 98% (08-12-20 @ 13:30)  Wt(kg): --    GENERAL: NAD, well-developed  HEAD:  Atraumatic, Normocephalic  EYES: EOMI, PERRLA, conjunctiva and sclera clear  NECK: Supple, No JVD  CHEST/LUNG: Clear to auscultation bilaterally; No wheeze  HEART: Regular rate and rhythm; No murmurs, rubs, or gallops  ABDOMEN: Soft, Nontender, Nondistended; Bowel sounds present  EXTREMITIES:  2+ Peripheral Pulses, No clubbing, cyanosis, or edema  NEUROLOGY: non-focal  SKIN: No rashes or lesions                          7.9    2.84  )-----------( 157      ( 12 Aug 2020 08:01 )             25.4       08-12    144  |  107  |  21  ----------------------------<  154<H>  4.4   |  26  |  1.20    Ca    8.8      12 Aug 2020 08:01    TPro  5.8<L>  /  Alb  3.5  /  TBili  0.6  /  DBili  x   /  AST  40  /  ALT  32  /  AlkPhos  64  08-12          PT/INR - ( 11 Aug 2020 22:28 )   PT: 12.2 sec;   INR: 1.03 ratio         PTT - ( 11 Aug 2020 22:28 )  PTT:25.3 sec    .Blood Blood-Peripheral  06-17 @ 16:59   No Growth Final  --  --      .Blood Blood-Peripheral  06-17 @ 14:19   No Growth Final  --  --      .Blood  05-27 @ 10:16   Babesia microti PCR  Results: NOT detected  ***************Result Note*************  The detection of Babesia microti by PCR has only been  validated for whole blood; this test has not been approved  by the US Food and Drug Administration (FDA). Performance  characteristics of this assay have been determined by  [employer]. The clinical significance  of results should be considered in conjunction with the  overall clinical presentation of the patient. Result is not  intended to be used as the sole means for clinical diagnosis  or patient management decisions.  One negative sample does not necessarily rule  out the presence of a parasitic infection.  --  --

## 2020-08-12 NOTE — PROGRESS NOTE ADULT - PROBLEM SELECTOR PLAN 2
s/p 1 unit PRBC overnight  Heme consult -called, Labs-LDH, Retic, Hapto, Direct coobs, lfts, b12, folate-ordered  GI Consult -Called. Pt with 1 tarry stool reported.

## 2020-08-12 NOTE — CONSULT NOTE ADULT - PROBLEM SELECTOR RECOMMENDATION 9
-C/w ASA/statin for TCAD ppx  -C/w everolimus 0.75mg BID  -C/w Bactrim SS, Valtrex, posiconazole for ppx -C/w ASA/statin for TCAD ppx  -C/w everolimus 0.75mg BID, will follow levels periodically while hospitalized  -C/w Bactrim SS, Valtrex, posiconazole for ppx  -Off cellcept while on high dose steroids

## 2020-08-13 ENCOUNTER — OUTPATIENT (OUTPATIENT)
Dept: OUTPATIENT SERVICES | Facility: HOSPITAL | Age: 70
LOS: 1 days | Discharge: HOME | End: 2020-08-13

## 2020-08-13 DIAGNOSIS — Z94.1 HEART TRANSPLANT STATUS: Chronic | ICD-10-CM

## 2020-08-13 DIAGNOSIS — D64.9 ANEMIA, UNSPECIFIED: ICD-10-CM

## 2020-08-13 DIAGNOSIS — N39.0 URINARY TRACT INFECTION, SITE NOT SPECIFIED: ICD-10-CM

## 2020-08-13 DIAGNOSIS — Z98.890 OTHER SPECIFIED POSTPROCEDURAL STATES: Chronic | ICD-10-CM

## 2020-08-13 LAB
ALBUMIN SERPL ELPH-MCNC: 4.1 G/DL — SIGNIFICANT CHANGE UP (ref 3.3–5)
ALP SERPL-CCNC: 71 U/L — SIGNIFICANT CHANGE UP (ref 40–120)
ALT FLD-CCNC: 21 U/L — SIGNIFICANT CHANGE UP (ref 10–45)
ANION GAP SERPL CALC-SCNC: 10 MMOL/L — SIGNIFICANT CHANGE UP (ref 5–17)
ANION GAP SERPL CALC-SCNC: 17 MMOL/L — SIGNIFICANT CHANGE UP (ref 5–17)
AST SERPL-CCNC: 24 U/L — SIGNIFICANT CHANGE UP (ref 10–40)
BILIRUB SERPL-MCNC: 0.7 MG/DL — SIGNIFICANT CHANGE UP (ref 0.2–1.2)
BLD GP AB SCN SERPL QL: NEGATIVE — SIGNIFICANT CHANGE UP
BUN SERPL-MCNC: 18 MG/DL — SIGNIFICANT CHANGE UP (ref 7–23)
BUN SERPL-MCNC: 21 MG/DL — SIGNIFICANT CHANGE UP (ref 7–23)
CALCIUM SERPL-MCNC: 8.7 MG/DL — SIGNIFICANT CHANGE UP (ref 8.4–10.5)
CALCIUM SERPL-MCNC: 9.1 MG/DL — SIGNIFICANT CHANGE UP (ref 8.4–10.5)
CHLORIDE SERPL-SCNC: 100 MMOL/L — SIGNIFICANT CHANGE UP (ref 96–108)
CHLORIDE SERPL-SCNC: 103 MMOL/L — SIGNIFICANT CHANGE UP (ref 96–108)
CK SERPL-CCNC: 78 U/L — SIGNIFICANT CHANGE UP (ref 30–200)
CO2 SERPL-SCNC: 22 MMOL/L — SIGNIFICANT CHANGE UP (ref 22–31)
CO2 SERPL-SCNC: 27 MMOL/L — SIGNIFICANT CHANGE UP (ref 22–31)
CREAT SERPL-MCNC: 1.07 MG/DL — SIGNIFICANT CHANGE UP (ref 0.5–1.3)
CREAT SERPL-MCNC: 1.26 MG/DL — SIGNIFICANT CHANGE UP (ref 0.5–1.3)
ELUATE ANTIBODY 1: SIGNIFICANT CHANGE UP
GAS PNL BLDA: SIGNIFICANT CHANGE UP
GAS PNL BLDA: SIGNIFICANT CHANGE UP
GLUCOSE BLDC GLUCOMTR-MCNC: 152 MG/DL — HIGH (ref 70–99)
GLUCOSE BLDC GLUCOMTR-MCNC: 172 MG/DL — HIGH (ref 70–99)
GLUCOSE BLDC GLUCOMTR-MCNC: 180 MG/DL — HIGH (ref 70–99)
GLUCOSE BLDC GLUCOMTR-MCNC: 206 MG/DL — HIGH (ref 70–99)
GLUCOSE BLDC GLUCOMTR-MCNC: 221 MG/DL — HIGH (ref 70–99)
GLUCOSE SERPL-MCNC: 160 MG/DL — HIGH (ref 70–99)
GLUCOSE SERPL-MCNC: 181 MG/DL — HIGH (ref 70–99)
HAPTOGLOB SERPL-MCNC: 156 MG/DL — SIGNIFICANT CHANGE UP (ref 34–200)
HCT VFR BLD CALC: 24.2 % — LOW (ref 39–50)
HCT VFR BLD CALC: 28.5 % — LOW (ref 39–50)
HGB BLD-MCNC: 7.4 G/DL — LOW (ref 13–17)
HGB BLD-MCNC: 8.6 G/DL — LOW (ref 13–17)
MCHC RBC-ENTMCNC: 30.2 GM/DL — LOW (ref 32–36)
MCHC RBC-ENTMCNC: 30.6 GM/DL — LOW (ref 32–36)
MCHC RBC-ENTMCNC: 31.3 PG — SIGNIFICANT CHANGE UP (ref 27–34)
MCHC RBC-ENTMCNC: 32.2 PG — SIGNIFICANT CHANGE UP (ref 27–34)
MCV RBC AUTO: 103.6 FL — HIGH (ref 80–100)
MCV RBC AUTO: 105.2 FL — HIGH (ref 80–100)
NRBC # BLD: 2 /100 WBCS — HIGH (ref 0–0)
NRBC # BLD: 2 /100 WBCS — HIGH (ref 0–0)
PLATELET # BLD AUTO: 148 K/UL — LOW (ref 150–400)
PLATELET # BLD AUTO: 203 K/UL — SIGNIFICANT CHANGE UP (ref 150–400)
POTASSIUM SERPL-MCNC: 4 MMOL/L — SIGNIFICANT CHANGE UP (ref 3.5–5.3)
POTASSIUM SERPL-MCNC: 4.1 MMOL/L — SIGNIFICANT CHANGE UP (ref 3.5–5.3)
POTASSIUM SERPL-SCNC: 4 MMOL/L — SIGNIFICANT CHANGE UP (ref 3.5–5.3)
POTASSIUM SERPL-SCNC: 4.1 MMOL/L — SIGNIFICANT CHANGE UP (ref 3.5–5.3)
PROT SERPL-MCNC: 6.5 G/DL — SIGNIFICANT CHANGE UP (ref 6–8.3)
RBC # BLD: 2.3 M/UL — LOW (ref 4.2–5.8)
RBC # BLD: 2.75 M/UL — LOW (ref 4.2–5.8)
RBC # FLD: 18.6 % — HIGH (ref 10.3–14.5)
RBC # FLD: 18.7 % — HIGH (ref 10.3–14.5)
RH IG SCN BLD-IMP: POSITIVE — SIGNIFICANT CHANGE UP
SODIUM SERPL-SCNC: 139 MMOL/L — SIGNIFICANT CHANGE UP (ref 135–145)
SODIUM SERPL-SCNC: 140 MMOL/L — SIGNIFICANT CHANGE UP (ref 135–145)
WBC # BLD: 2.75 K/UL — LOW (ref 3.8–10.5)
WBC # BLD: 6.43 K/UL — SIGNIFICANT CHANGE UP (ref 3.8–10.5)
WBC # FLD AUTO: 2.75 K/UL — LOW (ref 3.8–10.5)
WBC # FLD AUTO: 6.43 K/UL — SIGNIFICANT CHANGE UP (ref 3.8–10.5)

## 2020-08-13 PROCEDURE — 99291 CRITICAL CARE FIRST HOUR: CPT

## 2020-08-13 PROCEDURE — 44360 SMALL BOWEL ENDOSCOPY: CPT | Mod: GC

## 2020-08-13 PROCEDURE — 99232 SBSQ HOSP IP/OBS MODERATE 35: CPT

## 2020-08-13 PROCEDURE — 99222 1ST HOSP IP/OBS MODERATE 55: CPT

## 2020-08-13 PROCEDURE — 99233 SBSQ HOSP IP/OBS HIGH 50: CPT | Mod: GC

## 2020-08-13 PROCEDURE — 93010 ELECTROCARDIOGRAM REPORT: CPT

## 2020-08-13 PROCEDURE — 71045 X-RAY EXAM CHEST 1 VIEW: CPT | Mod: 26

## 2020-08-13 PROCEDURE — 99223 1ST HOSP IP/OBS HIGH 75: CPT | Mod: GC,25

## 2020-08-13 PROCEDURE — 86077 PHYS BLOOD BANK SERV XMATCH: CPT

## 2020-08-13 PROCEDURE — 74018 RADEX ABDOMEN 1 VIEW: CPT | Mod: 26

## 2020-08-13 RX ORDER — DEXMEDETOMIDINE HYDROCHLORIDE IN 0.9% SODIUM CHLORIDE 4 UG/ML
0.7 INJECTION INTRAVENOUS
Qty: 200 | Refills: 0 | Status: DISCONTINUED | OUTPATIENT
Start: 2020-08-13 | End: 2020-08-14

## 2020-08-13 RX ORDER — SODIUM CHLORIDE 9 MG/ML
1000 INJECTION INTRAMUSCULAR; INTRAVENOUS; SUBCUTANEOUS
Refills: 0 | Status: DISCONTINUED | OUTPATIENT
Start: 2020-08-13 | End: 2020-08-18

## 2020-08-13 RX ORDER — ADENOSINE 3 MG/ML
3 INJECTION INTRAVENOUS ONCE
Refills: 0 | Status: COMPLETED | OUTPATIENT
Start: 2020-08-13 | End: 2020-08-13

## 2020-08-13 RX ORDER — VANCOMYCIN HCL 1 G
VIAL (EA) INTRAVENOUS
Refills: 0 | Status: DISCONTINUED | OUTPATIENT
Start: 2020-08-13 | End: 2020-08-14

## 2020-08-13 RX ORDER — METOCLOPRAMIDE HCL 10 MG
10 TABLET ORAL ONCE
Refills: 0 | Status: COMPLETED | OUTPATIENT
Start: 2020-08-13 | End: 2020-08-13

## 2020-08-13 RX ORDER — VANCOMYCIN HCL 1 G
1000 VIAL (EA) INTRAVENOUS EVERY 12 HOURS
Refills: 0 | Status: DISCONTINUED | OUTPATIENT
Start: 2020-08-14 | End: 2020-08-14

## 2020-08-13 RX ORDER — CEFEPIME 1 G/1
1000 INJECTION, POWDER, FOR SOLUTION INTRAMUSCULAR; INTRAVENOUS EVERY 8 HOURS
Refills: 0 | Status: DISCONTINUED | OUTPATIENT
Start: 2020-08-13 | End: 2020-08-14

## 2020-08-13 RX ORDER — HYDRALAZINE HCL 50 MG
10 TABLET ORAL ONCE
Refills: 0 | Status: COMPLETED | OUTPATIENT
Start: 2020-08-13 | End: 2020-08-13

## 2020-08-13 RX ORDER — SOD SULF/SODIUM/NAHCO3/KCL/PEG
4000 SOLUTION, RECONSTITUTED, ORAL ORAL ONCE
Refills: 0 | Status: COMPLETED | OUTPATIENT
Start: 2020-08-13 | End: 2020-08-13

## 2020-08-13 RX ORDER — HYDROMORPHONE HYDROCHLORIDE 2 MG/ML
0.5 INJECTION INTRAMUSCULAR; INTRAVENOUS; SUBCUTANEOUS ONCE
Refills: 0 | Status: DISCONTINUED | OUTPATIENT
Start: 2020-08-13 | End: 2020-08-13

## 2020-08-13 RX ORDER — AMIODARONE HYDROCHLORIDE 400 MG/1
150 TABLET ORAL ONCE
Refills: 0 | Status: COMPLETED | OUTPATIENT
Start: 2020-08-13 | End: 2020-08-14

## 2020-08-13 RX ORDER — AMIODARONE HYDROCHLORIDE 400 MG/1
1 TABLET ORAL
Qty: 900 | Refills: 0 | Status: DISCONTINUED | OUTPATIENT
Start: 2020-08-13 | End: 2020-08-14

## 2020-08-13 RX ORDER — ADENOSINE 3 MG/ML
6 INJECTION INTRAVENOUS ONCE
Refills: 0 | Status: COMPLETED | OUTPATIENT
Start: 2020-08-13 | End: 2020-08-13

## 2020-08-13 RX ORDER — ACETAMINOPHEN 500 MG
1000 TABLET ORAL ONCE
Refills: 0 | Status: COMPLETED | OUTPATIENT
Start: 2020-08-13 | End: 2020-08-13

## 2020-08-13 RX ORDER — AMIODARONE HYDROCHLORIDE 400 MG/1
150 TABLET ORAL ONCE
Refills: 0 | Status: COMPLETED | OUTPATIENT
Start: 2020-08-13 | End: 2020-08-13

## 2020-08-13 RX ORDER — PANTOPRAZOLE SODIUM 20 MG/1
40 TABLET, DELAYED RELEASE ORAL EVERY 12 HOURS
Refills: 0 | Status: DISCONTINUED | OUTPATIENT
Start: 2020-08-13 | End: 2020-08-21

## 2020-08-13 RX ORDER — VANCOMYCIN HCL 1 G
1000 VIAL (EA) INTRAVENOUS ONCE
Refills: 0 | Status: COMPLETED | OUTPATIENT
Start: 2020-08-13 | End: 2020-08-13

## 2020-08-13 RX ADMIN — Medication 2 GRAM(S): at 05:40

## 2020-08-13 RX ADMIN — Medication 2: at 11:52

## 2020-08-13 RX ADMIN — SODIUM CHLORIDE 3 MILLILITER(S): 9 INJECTION INTRAMUSCULAR; INTRAVENOUS; SUBCUTANEOUS at 21:22

## 2020-08-13 RX ADMIN — POSACONAZOLE 300 MILLIGRAM(S): 100 TABLET, DELAYED RELEASE ORAL at 11:04

## 2020-08-13 RX ADMIN — Medication 10 MILLIGRAM(S): at 22:18

## 2020-08-13 RX ADMIN — Medication 10 MILLIGRAM(S): at 22:25

## 2020-08-13 RX ADMIN — EVEROLIMUS 0.75 MILLIGRAM(S): 10 TABLET ORAL at 07:59

## 2020-08-13 RX ADMIN — ADENOSINE 3 MILLIGRAM(S): 3 INJECTION INTRAVENOUS at 23:15

## 2020-08-13 RX ADMIN — PANTOPRAZOLE SODIUM 40 MILLIGRAM(S): 20 TABLET, DELAYED RELEASE ORAL at 05:40

## 2020-08-13 RX ADMIN — AMIODARONE HYDROCHLORIDE 600 MILLIGRAM(S): 400 TABLET ORAL at 23:44

## 2020-08-13 RX ADMIN — MAGNESIUM OXIDE 400 MG ORAL TABLET 400 MILLIGRAM(S): 241.3 TABLET ORAL at 11:02

## 2020-08-13 RX ADMIN — EVEROLIMUS 0.75 MILLIGRAM(S): 10 TABLET ORAL at 19:55

## 2020-08-13 RX ADMIN — Medication 1: at 18:14

## 2020-08-13 RX ADMIN — Medication 1000 UNIT(S): at 11:02

## 2020-08-13 RX ADMIN — Medication 10 MILLIGRAM(S): at 21:21

## 2020-08-13 RX ADMIN — Medication 20 MILLIGRAM(S): at 21:21

## 2020-08-13 RX ADMIN — Medication 250 MILLIGRAM(S): at 23:45

## 2020-08-13 RX ADMIN — CEFEPIME 100 MILLIGRAM(S): 1 INJECTION, POWDER, FOR SOLUTION INTRAMUSCULAR; INTRAVENOUS at 23:45

## 2020-08-13 RX ADMIN — Medication 650 MILLIGRAM(S): at 05:40

## 2020-08-13 RX ADMIN — Medication 50 MILLIGRAM(S): at 05:40

## 2020-08-13 RX ADMIN — Medication 2 GRAM(S): at 18:19

## 2020-08-13 RX ADMIN — Medication 650 MILLIGRAM(S): at 18:19

## 2020-08-13 RX ADMIN — Medication 81 MILLIGRAM(S): at 11:02

## 2020-08-13 RX ADMIN — SODIUM CHLORIDE 3 MILLILITER(S): 9 INJECTION INTRAMUSCULAR; INTRAVENOUS; SUBCUTANEOUS at 05:39

## 2020-08-13 RX ADMIN — Medication 1 MILLIGRAM(S): at 11:02

## 2020-08-13 RX ADMIN — PANTOPRAZOLE SODIUM 40 MILLIGRAM(S): 20 TABLET, DELAYED RELEASE ORAL at 18:19

## 2020-08-13 RX ADMIN — HYDROMORPHONE HYDROCHLORIDE 0.5 MILLIGRAM(S): 2 INJECTION INTRAMUSCULAR; INTRAVENOUS; SUBCUTANEOUS at 22:40

## 2020-08-13 RX ADMIN — Medication 1 TABLET(S): at 11:02

## 2020-08-13 RX ADMIN — ADENOSINE 6 MILLIGRAM(S): 3 INJECTION INTRAVENOUS at 23:20

## 2020-08-13 RX ADMIN — Medication 30 MILLILITER(S): at 22:18

## 2020-08-13 RX ADMIN — Medication 7 UNIT(S): at 18:15

## 2020-08-13 RX ADMIN — SODIUM CHLORIDE 3 MILLILITER(S): 9 INJECTION INTRAMUSCULAR; INTRAVENOUS; SUBCUTANEOUS at 12:57

## 2020-08-13 RX ADMIN — VALGANCICLOVIR 450 MILLIGRAM(S): 450 TABLET, FILM COATED ORAL at 11:02

## 2020-08-13 RX ADMIN — Medication 4000 MILLILITER(S): at 19:55

## 2020-08-13 RX ADMIN — HYDROMORPHONE HYDROCHLORIDE 0.5 MILLIGRAM(S): 2 INJECTION INTRAMUSCULAR; INTRAVENOUS; SUBCUTANEOUS at 22:55

## 2020-08-13 NOTE — CONSULT NOTE ADULT - SUBJECTIVE AND OBJECTIVE BOX
Chief Complaint:  Patient is a 70y old  Male who presents with a chief complaint of SOB/anemia (12 Aug 2020 14:33)      HPI:  69 yo M w/ PMhx NICM s/p heart transplant (2018) complicated by prior antibody mediated rejection (s/p IVIG, plasmapharesis, rituximab), HCV s/p treatment, CKD, history of small bowel angiectasis (2018) treated w/ APC, multiple recent admission for hemolytic anemia (-; -) treated w/ steroids, IVIG, rituximab, presenting w/ melena x 4 days.   Patient states for past four days having black tarry stools, 2-3 per day, soft, no abd pain. No n/v, no fever/chills. No NSAIDs, no EtOH. Taking PPI. On aspirin, no other AC. Last EGD  with duodenal angioectasias. Last colonoscopy (screening?) [id number] was wnl per patient report. Denies FHx of GI malignancy.     In the ED, patient noted to be tachy ( -> 107), otherwise HDS. Labs notable for Hg 6.4 (baseline 9 2020), . Patient transfused 1u pRBC and admitted.       Allergies:  No Known Allergies      Home Medications:    Hospital Medications:  aspirin enteric coated 81 milliGRAM(s) Oral daily  cholecalciferol 1000 Unit(s) Oral daily  dextrose 40% Gel 15 Gram(s) Oral once PRN  dextrose 5%. 1000 milliLiter(s) IV Continuous <Continuous>  dextrose 50% Injectable 12.5 Gram(s) IV Push once  everolimus (ZORTRESS) 0.75 milliGRAM(s) Oral <User Schedule>  folic acid 1 milliGRAM(s) Oral daily  glucagon  Injectable 1 milliGRAM(s) IntraMuscular once PRN  insulin glargine Injectable (LANTUS) 14 Unit(s) SubCutaneous at bedtime  insulin lispro (HumaLOG) corrective regimen sliding scale   SubCutaneous three times a day before meals  insulin lispro (HumaLOG) corrective regimen sliding scale   SubCutaneous at bedtime  insulin lispro Injectable (HumaLOG) 5 Unit(s) SubCutaneous before breakfast  insulin lispro Injectable (HumaLOG) 7 Unit(s) SubCutaneous before lunch  insulin lispro Injectable (HumaLOG) 7 Unit(s) SubCutaneous before dinner  magnesium oxide 400 milliGRAM(s) Oral daily  omega-3-Acid Ethyl Esters 2 Gram(s) Oral two times a day  pantoprazole    Tablet 40 milliGRAM(s) Oral before breakfast  posaconazole DR Tablet 300 milliGRAM(s) Oral daily  pravastatin 20 milliGRAM(s) Oral at bedtime  predniSONE   Tablet 50 milliGRAM(s) Oral daily  sodium bicarbonate 650 milliGRAM(s) Oral two times a day  sodium chloride 0.9% lock flush 3 milliLiter(s) IV Push every 8 hours  trimethoprim   80 mG/sulfamethoxazole 400 mG 1 Tablet(s) Oral daily  valGANciclovir 450 milliGRAM(s) Oral daily      PMHX/PSHX:  H/O hemolytic anemia  H/O autoimmune hemolytic anemia  Knee pain, right  HLD (hyperlipidemia)  Former smoker  DVT of upper extremity (deep vein thrombosis)  Hepatitis C virus  GIB (gastrointestinal bleeding)  H/O prior ablation treatment  Ventricular fibrillation  PAF (paroxysmal atrial fibrillation)  Non-Ischemic Cardiomyopathy  SVT (Supraventricular Tachycardia)  HTN  CHF (Congestive Heart Failure)  S/P right heart catheterization  H/O heart transplant  LVAD (left ventricular assist device) present  Status post left hip replacement  Hypertension  Hypertension  History of Prior Ablation Treatment  AICD (Automatic Cardioverter/Defibrillator) Present      Family history:  No pertinent family history in first degree relatives      Denies family history of colon cancer/polyps, stomach cancer/polyps, pancreatic cancer/masses, liver cancer/disease, ovarian cancer and endometrial cancer.    Social History:   Tob: Denies  EtOH: Denies  Illicit Drugs: Denies    ROS:     General:  No wt loss, fevers, chills, night sweats, fatigue  Eyes:  Good vision, no reported pain  ENT:  No sore throat, pain, runny nose, dysphagia  CV:  No pain, palpitations, hypo/hypertension  Pulm:  No dyspnea, cough, tachypnea, wheezing  GI:  No pain, No nausea, No vomiting, No diarrhea, No constipation, No weight loss, No fever, No pruritis, No rectal bleeding, No tarry stools, No dysphagia,  :  No pain, bleeding, incontinence, nocturia  Muscle:  No pain, weakness  Neuro:  No weakness, tingling, memory problems  Psych:  No fatigue, insomnia, mood problems, depression  Endocrine:  No polyuria, polydipsia, cold/heat intolerance  Heme:  No petechiae, ecchymosis, easy bruisability  Skin:  No rash, tattoos, scars, edema    PHYSICAL EXAM:     GENERAL:  No acute distress  HEENT:  Normocephalic/atraumatic, no scleral icterus  CHEST:  Clear to auscultation bilaterally, no wheezes/rales/ronchi, no accessory muscle use  HEART:  Regular rate and rhythm, no murmurs/rubs/gallops  ABDOMEN:  surgical scars c/d/i, soft, nontender, no r/g  EXTREMITIES: No cyanosis, clubbing, or edema  SKIN:  No rash/erythema/ecchymoses/petechiae/wounds/abscess/warm/dry  NEURO:  Alert and oriented x 3, no asterixis    Vital Signs:  Vital Signs Last 24 Hrs  T(C): 36.6 (13 Aug 2020 05:00), Max: 36.7 (12 Aug 2020 13:30)  T(F): 97.9 (13 Aug 2020 05:00), Max: 98 (12 Aug 2020 13:30)  HR: 111 (13 Aug 2020 05:00) (111 - 130)  BP: 133/92 (13 Aug 2020 05:00) (133/92 - 152/99)  BP(mean): 106 (13 Aug 2020 05:00) (106 - 106)  RR: 18 (13 Aug 2020 05:00) (18 - 18)  SpO2: 97% (13 Aug 2020 05:00) (97% - 98%)  Daily     Daily Weight in k.2 (13 Aug 2020 07:37)    LABS:                        7.9    2.84  )-----------( 157      ( 12 Aug 2020 08:01 )             25.4     Mean Cell Volume: 105.0 fl (-20 @ 08:01)        144  |  107  |  21  ----------------------------<  154<H>  4.4   |  26  |  1.20    Ca    8.8      12 Aug 2020 08:01    TPro  5.8<L>  /  Alb  3.5  /  TBili  0.6  /  DBili  x   /  AST  40  /  ALT  32  /  AlkPhos  64  08-12    LIVER FUNCTIONS - ( 12 Aug 2020 08:01 )  Alb: 3.5 g/dL / Pro: 5.8 g/dL / ALK PHOS: 64 U/L / ALT: 32 U/L / AST: 40 U/L / GGT: x           PT/INR - ( 11 Aug 2020 22:28 )   PT: 12.2 sec;   INR: 1.03 ratio         PTT - ( 11 Aug 2020 22:28 )  PTT:25.3 sec  Urinalysis Basic - ( 12 Aug 2020 01:06 )    Color: Yellow / Appearance: Clear / S.018 / pH: x  Gluc: x / Ketone: Negative  / Bili: Negative / Urobili: Negative   Blood: x / Protein: 30 mg/dL / Nitrite: Negative   Leuk Esterase: Moderate / RBC: 1 /hpf / WBC 16 /HPF   Sq Epi: x / Non Sq Epi: 2 /hpf / Bacteria: Many      Imaging:    Endoscopy 2018  Findings:       The esophagus was normal.       The stomach was normal.       Red blood was found in the entire examined duodenum. Lavage of the area was performed using        copious amounts of sterile water, resulting in clearance with excellent visualization.       A single angioectasia with stigmata of recent bleeding was found in the fourth part of the        duodenum. Coagulation for hemostasis using bipolar probe was successful.       A few spots with no bleeding were found in the third part of the duodenum and in the fourth        part of the duodenum. Coagulation for bleeding prevention using bipolar probe was successful.                                                                                                        Impression:          - Blood in the entire examined duodenum.                       - A single recently bleeding angioectasia in the duodenum.Treated with                        bipolar cautery.                       - A few spots with no bleeding in the duodenum. Treated with bipolar cautery.                       - No specimens collected. Chief Complaint:  Patient is a 70y old  Male who presents with a chief complaint of SOB/anemia (12 Aug 2020 14:33)      HPI:  71 yo M w/ PMhx NICM s/p heart transplant (2018) complicated by prior antibody mediated rejection (s/p IVIG, plasmapharesis, rituximab), HCV s/p treatment, CKD, history of small bowel angiectasis (2018) treated w/ APC, multiple recent admission for hemolytic anemia (-; -) treated w/ steroids, IVIG, rituximab, presenting w/ melena x 4 days.   Patient states for past four days having black tarry stools, 2-3 per day, soft, no abd pain. No n/v, no fever/chills. No NSAIDs, no EtOH. Taking PPI. On aspirin, no other AC. Last EGD  with duodenal angioectasias. Last colonoscopy (screening?) [id number] was wnl per patient report but he does not remember where or who performed it. No records of this being performed in patients' outpatient allscript chart. Denies FHx of GI malignancy.   In the ED, patient noted to be tachy ( -> 107), otherwise HDS. Labs notable for Hg 6.4 (baseline 2020), . Patient transfused 1u pRBC and admitted.       Allergies:  No Known Allergies      Home Medications:    Hospital Medications:  aspirin enteric coated 81 milliGRAM(s) Oral daily  cholecalciferol 1000 Unit(s) Oral daily  dextrose 40% Gel 15 Gram(s) Oral once PRN  dextrose 5%. 1000 milliLiter(s) IV Continuous <Continuous>  dextrose 50% Injectable 12.5 Gram(s) IV Push once  everolimus (ZORTRESS) 0.75 milliGRAM(s) Oral <User Schedule>  folic acid 1 milliGRAM(s) Oral daily  glucagon  Injectable 1 milliGRAM(s) IntraMuscular once PRN  insulin glargine Injectable (LANTUS) 14 Unit(s) SubCutaneous at bedtime  insulin lispro (HumaLOG) corrective regimen sliding scale   SubCutaneous three times a day before meals  insulin lispro (HumaLOG) corrective regimen sliding scale   SubCutaneous at bedtime  insulin lispro Injectable (HumaLOG) 5 Unit(s) SubCutaneous before breakfast  insulin lispro Injectable (HumaLOG) 7 Unit(s) SubCutaneous before lunch  insulin lispro Injectable (HumaLOG) 7 Unit(s) SubCutaneous before dinner  magnesium oxide 400 milliGRAM(s) Oral daily  omega-3-Acid Ethyl Esters 2 Gram(s) Oral two times a day  pantoprazole    Tablet 40 milliGRAM(s) Oral before breakfast  posaconazole DR Tablet 300 milliGRAM(s) Oral daily  pravastatin 20 milliGRAM(s) Oral at bedtime  predniSONE   Tablet 50 milliGRAM(s) Oral daily  sodium bicarbonate 650 milliGRAM(s) Oral two times a day  sodium chloride 0.9% lock flush 3 milliLiter(s) IV Push every 8 hours  trimethoprim   80 mG/sulfamethoxazole 400 mG 1 Tablet(s) Oral daily  valGANciclovir 450 milliGRAM(s) Oral daily      PMHX/PSHX:  H/O hemolytic anemia  H/O autoimmune hemolytic anemia  Knee pain, right  HLD (hyperlipidemia)  Former smoker  DVT of upper extremity (deep vein thrombosis)  Hepatitis C virus  GIB (gastrointestinal bleeding)  H/O prior ablation treatment  Ventricular fibrillation  PAF (paroxysmal atrial fibrillation)  Non-Ischemic Cardiomyopathy  SVT (Supraventricular Tachycardia)  HTN  CHF (Congestive Heart Failure)  S/P right heart catheterization  H/O heart transplant  LVAD (left ventricular assist device) present  Status post left hip replacement  Hypertension  Hypertension  History of Prior Ablation Treatment  AICD (Automatic Cardioverter/Defibrillator) Present      Family history:  No pertinent family history in first degree relatives      Denies family history of colon cancer/polyps, stomach cancer/polyps, pancreatic cancer/masses, liver cancer/disease, ovarian cancer and endometrial cancer.    Social History:   Tob: Denies  EtOH: Denies  Illicit Drugs: Denies    ROS:     General:  No wt loss, fevers, chills, night sweats, fatigue  Eyes:  Good vision, no reported pain  ENT:  No sore throat, pain, runny nose, dysphagia  CV:  No pain, palpitations, hypo/hypertension  Pulm:  No dyspnea, cough, tachypnea, wheezing  GI:  No pain, No nausea, No vomiting, No diarrhea, No constipation, No weight loss, No fever, No pruritis, No rectal bleeding, No tarry stools, No dysphagia,  :  No pain, bleeding, incontinence, nocturia  Muscle:  No pain, weakness  Neuro:  No weakness, tingling, memory problems  Psych:  No fatigue, insomnia, mood problems, depression  Endocrine:  No polyuria, polydipsia, cold/heat intolerance  Heme:  No petechiae, ecchymosis, easy bruisability  Skin:  No rash, tattoos, scars, edema    PHYSICAL EXAM:     GENERAL:  No acute distress  HEENT:  Normocephalic/atraumatic, no scleral icterus  CHEST:  Clear to auscultation bilaterally, no wheezes/rales/ronchi, no accessory muscle use  HEART:  Regular rate and rhythm, no murmurs/rubs/gallops  ABDOMEN:  surgical scars c/d/i, soft, nontender, no r/g  EXTREMITIES: No cyanosis, clubbing, or edema  SKIN:  No rash/erythema/ecchymoses/petechiae/wounds/abscess/warm/dry  NEURO:  Alert and oriented x 3, no asterixis    Vital Signs:  Vital Signs Last 24 Hrs  T(C): 36.6 (13 Aug 2020 05:00), Max: 36.7 (12 Aug 2020 13:30)  T(F): 97.9 (13 Aug 2020 05:00), Max: 98 (12 Aug 2020 13:30)  HR: 111 (13 Aug 2020 05:00) (111 - 130)  BP: 133/92 (13 Aug 2020 05:00) (133/92 - 152/99)  BP(mean): 106 (13 Aug 2020 05:00) (106 - 106)  RR: 18 (13 Aug 2020 05:00) (18 - 18)  SpO2: 97% (13 Aug 2020 05:00) (97% - 98%)  Daily     Daily Weight in k.2 (13 Aug 2020 07:37)    LABS:                        7.9    2.84  )-----------( 157      ( 12 Aug 2020 08:01 )             25.4     Mean Cell Volume: 105.0 fl (20 @ 08:01)    08-    144  |  107  |  21  ----------------------------<  154<H>  4.4   |  26  |  1.20    Ca    8.8      12 Aug 2020 08:01    TPro  5.8<L>  /  Alb  3.5  /  TBili  0.6  /  DBili  x   /  AST  40  /  ALT  32  /  AlkPhos  64  08-12    LIVER FUNCTIONS - ( 12 Aug 2020 08:01 )  Alb: 3.5 g/dL / Pro: 5.8 g/dL / ALK PHOS: 64 U/L / ALT: 32 U/L / AST: 40 U/L / GGT: x           PT/INR - ( 11 Aug 2020 22:28 )   PT: 12.2 sec;   INR: 1.03 ratio         PTT - ( 11 Aug 2020 22:28 )  PTT:25.3 sec  Urinalysis Basic - ( 12 Aug 2020 01:06 )    Color: Yellow / Appearance: Clear / S.018 / pH: x  Gluc: x / Ketone: Negative  / Bili: Negative / Urobili: Negative   Blood: x / Protein: 30 mg/dL / Nitrite: Negative   Leuk Esterase: Moderate / RBC: 1 /hpf / WBC 16 /HPF   Sq Epi: x / Non Sq Epi: 2 /hpf / Bacteria: Many      Imaging:    Endoscopy 2018  Findings:       The esophagus was normal.       The stomach was normal.       Red blood was found in the entire examined duodenum. Lavage of the area was performed using        copious amounts of sterile water, resulting in clearance with excellent visualization.       A single angioectasia with stigmata of recent bleeding was found in the fourth part of the        duodenum. Coagulation for hemostasis using bipolar probe was successful.       A few spots with no bleeding were found in the third part of the duodenum and in the fourth        part of the duodenum. Coagulation for bleeding prevention using bipolar probe was successful.                                                                                                        Impression:          - Blood in the entire examined duodenum.                       - A single recently bleeding angioectasia in the duodenum.Treated with                        bipolar cautery.                       - A few spots with no bleeding in the duodenum. Treated with bipolar cautery.                       - No specimens collected.

## 2020-08-13 NOTE — PROGRESS NOTE ADULT - PROBLEM SELECTOR PLAN 3
-Have asked for ID input re: leukopenia w/ bandemia - No evidence of neutropenia, continue to monitor

## 2020-08-13 NOTE — PROGRESS NOTE ADULT - ASSESSMENT
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 Patient was treated w/ prednisone and Rituximab. Tacro was discontinued and patient was also transitioned to everolimus  Admitted  6/16-6/19  for hyperglycemia.  Reported to transplant team having one done black tarry stool-  instructed to  present to [facility] for  Hemolytic anemia management. Patient has been following with Hematology outpatient.  Denies CP  N/V diarrhea SOB.  8/12: urine cx sent Hematology and GI called. Pt prednisone was lowered to 50 from 60 mg by HF team this am. Pt is a very difficult stick and phelbotomy asked to attempt to draw. Bloodcx requested per hematology.   8/13 Pt seen by GI , EGD today.  Witnessed tarry stool x 2  Pred taper  HCT 25 yesterday, await repeat

## 2020-08-13 NOTE — CONSULT NOTE ADULT - SUBJECTIVE AND OBJECTIVE BOX
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 Patient was treated w/ prednisone and Rituximab. Tacro was discontinued and patient was also transitioned to everolimus  Admitted  6/16-6/19  for hyperglycemia.  Reported to transplant team having one done black tarry stool-  instructed to  present to [facility] for hemolytic anemia. Patient has been following with Hematology outpatient.  Denies CP  N/V diarrhea SOB. (11 Aug 2020 20:08)  Denies fevers or chills  He does note a dry cough for the past few days    PAST MEDICAL & SURGICAL HISTORY:  H/O hemolytic anemia  H/O autoimmune hemolytic anemia  Knee pain, right  HLD (hyperlipidemia)  Former smoker  DVT of upper extremity (deep vein thrombosis)  Hepatitis C virus  GIB (gastrointestinal bleeding)  Ventricular fibrillation: s/p AICD  PAF (paroxysmal atrial fibrillation): on xarelto  Non-Ischemic Cardiomyopathy  SVT (Supraventricular Tachycardia)  HTN  CHF (Congestive Heart Failure)  S/P right heart catheterization: biopsy multiple  H/O heart transplant: 2/2018  Status post left hip replacement  History of Prior Ablation Treatment: for afib  AICD (Automatic Cardioverter/Defibrillator) Present: [employer] with 1 [employer] lead4/1/09- explanted and replaced with [employer] 2 leads on 9/2/09      SOCIAL:    FAMILY HISTORY:  No pertinent family history in first degree relatives    REVIEW OF SYSTEMS  General:	Denies any malaise fatigue or chills. Fevers absent    Skin:No rash  	  Ophthalmologic:Denies any visual complaints,discharge redness or photophobia  	  ENMT:No nasal discharge,headache,sinus congestion or throat pain.No dental complaints    Respiratory and Thorax:No cough,sputum or chest pain.Denies shortness of breath  	  Cardiovascular:	No chest pain,palpitaions or dizziness    Gastrointestinal:	NO nausea,abdominal pain or diarrhea.    Genitourinary:	No dysuria,frequency. No flank pain    Musculoskeletal:	No joint swelling or pain.No weakness    Neurological:No confusion,diziness.No extremity weakness.No bladder or bowel incontinence	    Psychiatric:No delusions or hallucinations	    Hematology/Lymphatics:	No LN swelling.No gum bleeding     Endocrine:	No recent weight gain or loss.No abnormal heat/cold intolerance    Allergic/Immunologic:	No hives or rash   Allergies    No Known Allergies    Intolerances        ANTIMICROBIALS:    posaconazole DR Tablet 300 milliGRAM(s) Oral daily  trimethoprim   80 mG/sulfamethoxazole 400 mG 1 Tablet(s) Oral daily  valGANciclovir 450 milliGRAM(s) Oral daily      Vital Signs Last 24 Hrs  T(C): 36.3 (13 Aug 2020 17:38), Max: 36.6 (13 Aug 2020 05:00)  T(F): 97.3 (13 Aug 2020 17:38), Max: 97.9 (13 Aug 2020 05:00)  HR: 111 (13 Aug 2020 17:38) (105 - 130)  BP: 143/98 (13 Aug 2020 17:38) (106/86 - 152/99)  BP(mean): 106 (13 Aug 2020 05:00) (106 - 106)  RR: 18 (13 Aug 2020 17:38) (14 - 18)  SpO2: 98% (13 Aug 2020 17:38) (95% - 99%)    PHYSICAL EXAM:Pleasant patient in no acute distress.      Constitutional:Comfortable.Awake and alert  No cachexia   cushingnoid appearance secondary to steroids  Eyes:PERRL EOMI.NO discharge or conjunctival injection    ENMT:No sinus tenderness.No thrush.No pharyngeal exudate or erythema. no teeth    Neck:Supple,No LN,no JVD      Respiratory:Good air entry bilaterally,CTA  but with some mild SOB with speaking in full sentences    Cardiovascular:S1 S2 wnl, No murmurs,rub or gallops  soft systolic murmur    Gastrointestinal:Soft BS(+) no tenderness no masses ,No rebound or guarding    Genitourinary:No CVA tendereness     Rectal:    Extremities:No cyanosis,clubbing or edema.    Vascular:peripheral pulses felt    Neurological:AAO X 3,No grossly focal deficits    Skin:No rash     Lymph Nodes:No palpable LNs    Musculoskeletal:No joint swelling or LOM    Psychiatric:Affect normal.                              7.4    2.75  )-----------( 148      ( 13 Aug 2020 10:04 )             24.2       08-13    140  |  103  |  21  ----------------------------<  160<H>  4.1   |  27  |  1.26    Ca    8.7      13 Aug 2020 10:04    TPro  5.8<L>  /  Alb  3.5  /  TBili  0.6  /  DBili  x   /  AST  40  /  ALT  32  /  AlkPhos  64  08-12      RECENT CULTURES:    MRSA PCR Result.: Julian: MRSA/MSSA PCR assay is a qualitative in vitro diagnostic test for the  direct detection and differentiation of methicillin-resistant  Staphylococcus aureus (MRSA) from Staphylococcus aureus (SA). The assay  detects DNA from nasal swabs in patients atrisk for nasal colonization.  It is not intended to diagnose MRSA or SA infections nor guide or monitor  treatment for MRSA/SA infections. A negative result does not preclude  nasal colonization. The assay is FDA-approved and its performance has  been established by [employer], [place] and the [facility], [place], [place]. (08.12.20 @ 15:03)    Urine Microscopic-Add On (NC) (08.12.20 @ 01:06)    Red Blood Cell - Urine: 1 /hpf    White Blood Cell - Urine: 16 /HPF    Hyaline Casts: 3 /lpf    Bacteria: Many    Epithelial Cells: 2 /hpf      RADIOLOGY:  < from: Xray Chest 1 View AP/PA (08.11.20 @ 18:19) >    IMPRESSION:    Stable elevation of the right hemidiaphragm.  Unchanged linear opacity at the right lower lung field representing atelectasis versus scarring..    < end of copied text >      IMPRESSION:    Rx:

## 2020-08-13 NOTE — PROGRESS NOTE ADULT - SUBJECTIVE AND OBJECTIVE BOX
Patient seen and examined at bedside.    Overnight Events: NAEO. This AM, pt continues to report eps of dark/tarry stools. He is arranged for EGD today.    Review Of Systems: No chest pain, shortness of breath, or palpitations            Current Meds:  aspirin enteric coated 81 milliGRAM(s) Oral daily  cholecalciferol 1000 Unit(s) Oral daily  dextrose 40% Gel 15 Gram(s) Oral once PRN  dextrose 5%. 1000 milliLiter(s) IV Continuous <Continuous>  dextrose 50% Injectable 12.5 Gram(s) IV Push once  everolimus (ZORTRESS) 0.75 milliGRAM(s) Oral <User Schedule>  folic acid 1 milliGRAM(s) Oral daily  glucagon  Injectable 1 milliGRAM(s) IntraMuscular once PRN  insulin glargine Injectable (LANTUS) 14 Unit(s) SubCutaneous at bedtime  insulin lispro (HumaLOG) corrective regimen sliding scale   SubCutaneous three times a day before meals  insulin lispro (HumaLOG) corrective regimen sliding scale   SubCutaneous at bedtime  insulin lispro Injectable (HumaLOG) 5 Unit(s) SubCutaneous before breakfast  insulin lispro Injectable (HumaLOG) 7 Unit(s) SubCutaneous before lunch  insulin lispro Injectable (HumaLOG) 7 Unit(s) SubCutaneous before dinner  magnesium oxide 400 milliGRAM(s) Oral daily  omega-3-Acid Ethyl Esters 2 Gram(s) Oral two times a day  pantoprazole  Injectable 40 milliGRAM(s) IV Push every 12 hours  posaconazole DR Tablet 300 milliGRAM(s) Oral daily  pravastatin 20 milliGRAM(s) Oral at bedtime  sodium bicarbonate 650 milliGRAM(s) Oral two times a day  sodium chloride 0.9% lock flush 3 milliLiter(s) IV Push every 8 hours  sodium chloride 0.9%. 1000 milliLiter(s) IV Continuous <Continuous>  trimethoprim   80 mG/sulfamethoxazole 400 mG 1 Tablet(s) Oral daily  valGANciclovir 450 milliGRAM(s) Oral daily      Vitals:  T(F): 97.9 (08-13), Max: 97.9 (08-13)  HR: 112 (08-13) (108 - 130)  BP: 142/99 (08-13) (133/92 - 152/99)  RR: 16 (08-13)  SpO2: 99% (08-13)  I&O's Summary    12 Aug 2020 07:01  -  13 Aug 2020 07:00  --------------------------------------------------------  IN: 780 mL / OUT: 2425 mL / NET: -1645 mL    13 Aug 2020 07:01  -  13 Aug 2020 14:22  --------------------------------------------------------  IN: 0 mL / OUT: 800 mL / NET: -800 mL        Physical Exam:  Gen: NAD.  HEENT: NCAT.  Neck: No JVP elev.  CV: Normal S1, S2. RRR. Continuous murmur.  Chest: CTAB. No WRR.  Abd: +BSx4. Soft. NTND.  Ext: No LE edema.  Skin: No cyanosis.                          7.4    2.75  )-----------( 148      ( 13 Aug 2020 10:04 )             24.2     08-13    140  |  103  |  21  ----------------------------<  160<H>  4.1   |  27  |  1.26    Ca    8.7      13 Aug 2020 10:04    TPro  5.8<L>  /  Alb  3.5  /  TBili  0.6  /  DBili  x   /  AST  40  /  ALT  32  /  AlkPhos  64  08-12    PT/INR - ( 11 Aug 2020 22:28 )   PT: 12.2 sec;   INR: 1.03 ratio         PTT - ( 11 Aug 2020 22:28 )  PTT:25.3 sec      Serum Pro-Brain Natriuretic Peptide: 736 pg/mL (08-11 @ 22:28)

## 2020-08-13 NOTE — PROGRESS NOTE ADULT - SUBJECTIVE AND OBJECTIVE BOX
BILLY RUSSELL   MRN#: [id number]     The patient is a 70y Male who was seen, evaluated, & examined with the CTICU staff post-operatively with a multidisciplinary care plan formulated & implemented.  All available clinical, laboratory, radiographic, pharmacologic, and electrocardiographic data were reviewed & analyzed.      The patient was in the CTICU in critical condition secondary to:     persistent cardiopulmonary dysfunction  cardiogenic shock-cardiovascular dysfunction  hemodynamically significant anemia/hypovolemia-shock  vasodilatory shock  hyperlactatemia-acidosis  uncontrolled Type II Diabetes mellitus-stress hyperglycemia  malignant hypertension      For support and evaluation & prevention of further decompensation secondary to persistent cardiopulmonary dysfunction and cardiogenic shock-cardiovascular dysfunction, respiratory status required:     supplemental oxygen with full ventilatory support / mechanical ventilation / BiPaP / high flow nasal cannula / nasal cannula  continuous pulse oximetry monitoring  following ABGs with A-line monitoring  IV Dexmedetomidine infusion  IV Propofol infusion  IV Fentanyl infusion     Invasive hemodynamic monitoring with     a PA catheter was required to follow serial CIs/MVO2s   an A-line was required for continuous MAP/BP monitoring     to ensure adequate cardiovascular support and to evaluate for & help prevent decompensation while receiving     intermittent volume expansion  blood transfusions  blood product transfusions  IV Levophed infusion  IV Vasopressin infusion  IV Epinephrine infusion  IV Dobutamine infusion  IV Primacor infusion  IV Phenylephrine infusion  IV Cardene infusion  IV Amiodarone infusion  IV Lidocaine infusion  VA ECMO  [employer]ri[employer] LVAD support    secondary to     cardiogenic shock-cardiovascular dysfunction  hemodynamically significant anemia/hypovolemia-shock  vasodilatory shock  hyperlactatemia-acidosis  acute postoperative blood loss anemia  acute on chronic postoperative kidney injury-failure    Metabolic stability, uncontrolled type II Diabetes mellitus-stress hyperglycemia, & infection prophylaxis required an IV regular Insulin drip & the following of serial glucose levels to help achieve & maintain euglycemia.      In addition:    The patient required critical care management and I personally provided 30 minutes of non-continuous care to the patient, excluding separate procedures, in addition to  discussing the patient and plan at length with the CTICU staff and helping coordinate care. 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 The patient was seen, evaluated, & examined by me along with CTICU staff and brought to the CTICU for further care. In consultation with heart failure cardiology and transplant surgery, a multidisciplinary care plan formulated & implemented.  All available clinical, laboratory, radiographic, pharmacologic, and electrocardiographic data were reviewed & analyzed.      The patient was in the CTICU in critical condition secondary to:     acute respiratory distress  severe hypertension  supraventricular tachycardia  possible sepsis  hyperlactatemia-acidosis    For support and evaluation & prevention of further decompensation secondary to persistent cardiopulmonary dysfunction and cardiogenic shock-cardiovascular dysfunction, respiratory status required:     bipap support via face mask  continuous pulse oximetry monitoring  following ABGs with A-line monitoring  IV Dexmedetomidine infusion    Invasive hemodynamic monitoring with     a central venous catheter was required for medication administration   an A-line was required for continuous MAP/BP monitoring     to ensure adequate cardiovascular support and to evaluate for & help prevent decompensation while receiving     IV Adenosinex2  IV Amiodarone bolus  IV Amiodarone infusion    secondary to     supraventricular tachycardia  hyperlactatemia-acidosis    In addition:    Blood cultures were sent x 2  Empiric IV Cefipime and Vancomycin were administered    The patient required critical care management and I personally provided 60 minutes of non-continuous care to the patient, excluding separate procedures, in addition to  discussing the patient and plan at length with the CTICU staff and helping coordinate care.

## 2020-08-13 NOTE — CONSULT NOTE ADULT - ASSESSMENT
Impression:   69 yo M w/ PMhx NICM s/p heart transplant (2018) complicated by prior antibody mediated rejection (s/p IVIG, plasmapharesis, rituximab), HCV s/p treatment, CKD, history of small bowel angiectasis (2018) treated w/ APC, multiple recent admission for hemolytic anemia (4/29-5/7; 5/22-6/2) treated w/ steroids, IVIG, rituximab, presenting w/ melena x 4 days.     # anemia/melena - patient's anemia likely multifactorial. Likely has component of acute GIB given history of melena. Etiology of GIB likely angioectasias given history, but also possible PUD, dieulafoy, mass/polyp, duodenitis/gastritis/esophagitis. Likely also has component of bone marrow suppression from everolimus, as patient also leukopenic. His macrocytic anemia can be from the bone marrow suppression or nutritional deficiencies. Lastly, given history of hemolysis, may also have ongoing hemolysis    Recommendations:  - plan for EGD today, NPO  - trend CBC, transfuse to 8 for cardiac history   - c/w aspirin  - PPI 40 IV BID until endoscopy  - f/u endoscopy report  - f/u hematology for evidence of hemolysis (LDH, haptologbin)  - f/u B12, folate for nutritional deficiencies     Thank you for this interesting consult. GI will continue to follow.     Ruben Causey, PGY4  Gastroenterology Fellow  Available on [employer] Teams  [phone] ([employer] Short Range Pager)  [phone] (Long Range Pager)    After 5pm, please contact the on-call GI fellow. [phone] Impression:   71 yo M w/ PMhx NICM s/p heart transplant (2018) complicated by prior antibody mediated rejection (s/p IVIG, plasmapharesis, rituximab), HCV s/p treatment, CKD, history of small bowel angiectasis (2018) treated w/ APC, multiple recent admission for hemolytic anemia (4/29-5/7; 5/22-6/2) treated w/ steroids, IVIG, rituximab, presenting w/ melena x 4 days.     # anemia/melena - patient's anemia likely multifactorial. Likely has component of acute GIB given history of melena. Etiology of GIB likely angioectasias given history, but also possible PUD, dieulafoy, mass/polyp, duodenitis/gastritis/esophagitis, small bowel angioectasia, right sided colonic lesions. Likely also has component of bone marrow suppression from everolimus, as patient also leukopenic. His macrocytic anemia can be from the bone marrow suppression or nutritional deficiencies. Lastly, given history of hemolysis, may also have ongoing hemolysis    Recommendations:  - plan for EGD today, NPO  - trend CBC, transfuse to 8 for cardiac history   - c/w aspirin  - PPI 40 IV BID until endoscopy  - f/u endoscopy report  - f/u hematology for evidence of hemolysis (LDH, haptologbin)  - f/u B12, folate for nutritional deficiencies     Thank you for this interesting consult. GI will continue to follow.     Ruben Causey, PGY4  Gastroenterology Fellow  Available on [employer] Teams  [phone] ([employer] Short Range Pager)  [phone] (Long Range Pager)    After 5pm, please contact the on-call GI fellow. [phone]

## 2020-08-13 NOTE — PROGRESS NOTE ADULT - ASSESSMENT
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 He has responded appropriately to a single blood transfusion. Of note, he recently has developed dark colored stools. Will investigate if anemia is due to GI bleeding in setting of steroid use or hemolysis and manage appropriately.    Review of pertinent studies  8/2020 labs: Direct Jacky +, 4.6% reticulocytes with low RI, normal bilirubin,  (stable). Haptoglobin pending. 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 He has responded appropriately to a single blood transfusion. Of note, he recently has developed dark colored stools. Labs overall not consistent with hemolysis. We are investigating for possible gastrointestinal bleeding as cause of blood loss.     Review of pertinent studies  8/2020 labs: Direct Jacky +, 4.6% reticulocytes with low RI, normal bilirubin,  (stable). Haptoglobin normal.

## 2020-08-13 NOTE — PROGRESS NOTE ADULT - PROBLEM SELECTOR PLAN 1
-C/w ASA/statin for TCAD ppx  -C/w everolimus 0.75mg BID, will follow levels periodically while hospitalized  -C/w Bactrim SS, Valtrex, posiconazole for ppx  -Off cellcept while on high dose steroids. -C/w ASA/statin for TCAD ppx  -C/w everolimus 0.75mg BID, will follow levels periodically while hospitalized. Goal 8-10  -C/w Bactrim SS, Valtrex, posiconazole for ppx  -Off cellcept while on high dose steroids.

## 2020-08-13 NOTE — PROGRESS NOTE ADULT - PROBLEM SELECTOR PLAN 2
-Appr hem recs  -Await results of hemolysis labs incl retic, LDH, hapto, B12, folate, Fe studies  -Planned for EGD +/- Cscope today  -Agree w/ hem; results of prelim labs do not suggest hemolysis as cause  -Agree w/ PDN taper as per hem  -S/p xfusion w/ good response; c/w trend CBC -Appreciate heme recs, no signs of hemolysis at this time  -Tapering prednisone per hematology: 40 mg daily followed by 10 mg decrease every 10 days  -Planned for EGD +/- Cscope today

## 2020-08-13 NOTE — PRE PROCEDURE NOTE - PRE PROCEDURE EVALUATION
Attending Physician: dr. carlos burrell                          Procedure:  upper gastrointestinal endoscopy/push enteroscopy    Indication for Procedure: gib  ________________________________________________________  PAST MEDICAL & SURGICAL HISTORY:  H/O hemolytic anemia  H/O autoimmune hemolytic anemia  Knee pain, right  HLD (hyperlipidemia)  Former smoker  DVT of upper extremity (deep vein thrombosis)  Hepatitis C virus  GIB (gastrointestinal bleeding)  Ventricular fibrillation: s/p AICD  PAF (paroxysmal atrial fibrillation): on xarelto  Non-Ischemic Cardiomyopathy  SVT (Supraventricular Tachycardia)  HTN  CHF (Congestive Heart Failure)  S/P right heart catheterization: biopsy multiple  H/O heart transplant: 2/2018  Status post left hip replacement  History of Prior Ablation Treatment: for afib  AICD (Automatic Cardioverter/Defibrillator) Present: [employer] with 1 [employer] lead4/1/09- explanted and replaced with [employer] 2 leads on 9/2/09    ALLERGIES:  No Known Allergies    HOME MEDICATIONS:  aspirin 81 mg oral delayed release tablet: 1 tab(s) orally once a day  cholecalciferol oral tablet: 1000 unit(s) orally once a day  everolimus 0.75 mg oral tablet: 1 tab(s) orally   folic acid 1 mg oral tablet: 1 tab(s) orally once a day  Lantus Solostar Pen 100 units/mL subcutaneous solution: 14 unit(s) subcutaneous once a day (at bedtime)  magnesium oxide 400 mg (241.3 mg elemental magnesium) oral tablet: 1 tab(s) orally once a day  pantoprazole 40 mg oral delayed release tablet: 1 tab(s) orally once a day (before a meal)  posaconazole 100 mg oral delayed release tablet: 3 tab(s) orally once a day  pravastatin 20 mg oral tablet: 1 tab(s) orally once a day (at bedtime)  predniSONE 20 mg oral tablet: 3 tab(s) orally   sodium bicarbonate 650 mg oral tablet: 1 tab(s) orally 2 times a day  sulfamethoxazole-trimethoprim 400 mg-80 mg oral tablet: 1 tab(s) orally once a day  valGANciclovir 450 mg oral tablet: 1 tab(s) orally once a day    AICD/PPM: [ ] yes   [x] no    PERTINENT LAB DATA:                        7.4    2.75  )-----------( 148      ( 13 Aug 2020 10:04 )             24.2     08-13    140  |  103  |  21  ----------------------------<  160<H>  4.1   |  27  |  1.26    Ca    8.7      13 Aug 2020 10:04    TPro  5.8<L>  /  Alb  3.5  /  TBili  0.6  /  DBili  x   /  AST  40  /  ALT  32  /  AlkPhos  64  08-12    PT/INR - ( 11 Aug 2020 22:28 )   PT: 12.2 sec;   INR: 1.03 ratio         PTT - ( 11 Aug 2020 22:28 )  PTT:25.3 sec        PHYSICAL EXAMINATION:    Height (cm): 170.18  Weight (kg): 75.2  BMI (kg/m2): 26  BSA (m2): 1.87T(C): 36.6  HR: 108  BP: 145/97  RR: 18  SpO2: 99%    Constitutional: NAD  Neck:  No JVD  Respiratory: CTAB/L  Cardiovascular: S1 and S2  Gastrointestinal: BS+, soft, NT/ND  Extremities: No peripheral edema  Neurological: A/O x 3  Skin: No rashes    COMMENTS:    The patient is a suitable candidate for the planned procedure unless box checked [ ]  No, explain:

## 2020-08-13 NOTE — PROGRESS NOTE ADULT - SUBJECTIVE AND OBJECTIVE BOX
im ok    VITAL SIGNS    Telemetry:  st 100    Vital Signs Last 24 Hrs  T(C): 36.6 (20 @ 05:00), Max: 36.7 (20 @ 13:30)  T(F): 97.9 (20 @ 05:00), Max: 98 (20 @ 13:30)  HR: 108 (20 @ 08:51) (108 - 130)  BP: 133/92 (20 @ 05:00) (133/92 - 152/99)  RR: 18 (20 @ 08:51) (18 - 18)  SpO2: 95% (20 @ 08:51) (95% - 98%)                    @ 07:01  -   @ 07:00  --------------------------------------------------------  IN: 780 mL / OUT: 2425 mL / NET: -1645 mL     @ 07:01  -   @ 10:02  --------------------------------------------------------  IN: 0 mL / OUT: 400 mL / NET: -400 mL          Daily Height in cm: 170.18 (13 Aug 2020 08:51)    Daily Weight in k.2 (13 Aug 2020 07:37)            CAPILLARY BLOOD GLUCOSE      POCT Blood Glucose.: 152 mg/dL (13 Aug 2020 07:49)  POCT Blood Glucose.: 180 mg/dL (13 Aug 2020 05:44)  POCT Blood Glucose.: 261 mg/dL (12 Aug 2020 21:12)  POCT Blood Glucose.: 279 mg/dL (12 Aug 2020 18:53)  POCT Blood Glucose.: 233 mg/dL (12 Aug 2020 12:22)                Pacing Wires          Coumadin    [ ] YES          [ x ]      NO                                   PHYSICAL EXAM        Neurology: alert and oriented x 3, nonfocal, no gross deficits  CV : s1 s2 RRR  Sternal Wound :  CDI , Stable, healing well  Lungs: cta  Abdomen: soft, nontender, nondistended, positive bowel sounds                        :    voiding   Extremities:    neg   edema   /  -   calve tenderness ,            aspirin enteric coated 81 milliGRAM(s) Oral daily  cholecalciferol 1000 Unit(s) Oral daily  dextrose 40% Gel 15 Gram(s) Oral once PRN  dextrose 5%. 1000 milliLiter(s) IV Continuous <Continuous>  dextrose 50% Injectable 12.5 Gram(s) IV Push once  everolimus (ZORTRESS) 0.75 milliGRAM(s) Oral <User Schedule>  folic acid 1 milliGRAM(s) Oral daily  glucagon  Injectable 1 milliGRAM(s) IntraMuscular once PRN  insulin glargine Injectable (LANTUS) 14 Unit(s) SubCutaneous at bedtime  insulin lispro (HumaLOG) corrective regimen sliding scale   SubCutaneous three times a day before meals  insulin lispro (HumaLOG) corrective regimen sliding scale   SubCutaneous at bedtime  insulin lispro Injectable (HumaLOG) 5 Unit(s) SubCutaneous before breakfast  insulin lispro Injectable (HumaLOG) 7 Unit(s) SubCutaneous before lunch  insulin lispro Injectable (HumaLOG) 7 Unit(s) SubCutaneous before dinner  magnesium oxide 400 milliGRAM(s) Oral daily  omega-3-Acid Ethyl Esters 2 Gram(s) Oral two times a day  pantoprazole    Tablet 40 milliGRAM(s) Oral before breakfast  posaconazole DR Tablet 300 milliGRAM(s) Oral daily  pravastatin 20 milliGRAM(s) Oral at bedtime  predniSONE   Tablet 50 milliGRAM(s) Oral daily  sodium bicarbonate 650 milliGRAM(s) Oral two times a day  sodium chloride 0.9% lock flush 3 milliLiter(s) IV Push every 8 hours  trimethoprim   80 mG/sulfamethoxazole 400 mG 1 Tablet(s) Oral daily  valGANciclovir 450 milliGRAM(s) Oral daily                    Physical Therapy Rec:   Home  [  ]   Home w/ PT  [  ]  Rehab  [  ]  Discussed with Cardiothoracic Team at AM rounds.

## 2020-08-13 NOTE — CONSULT NOTE ADULT - ASSESSMENT
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 He has responded appropriately to a single blood transfusion. Of note, he recently has developed dark colored stools. Will investigate if anemia is due to GI bleeding in setting of steroid use or hemolysis and manage appropriately. Underwent UGI without bleeding source identified  ID asked to follow for possible UTI as well as pancytopenia      Anemia- r/o GI bleeding    UGI non revealing  Plans to undergo colonoscopy on 8/14    r/o UTI-  Has many bacteria and WBCs on UA but is asymptomatic  Will repeat UA and send culture  Will discuss with Cardiology if oral quinolones are option for therapy    Pancytopenia-  check CMV viral load  repeat fungitell and galactomannan  would repeat chest CT scan to see status of prior RUL scarring vs. nodule    Fungitell-  moderately elevated in May and non detectable on more recent June admission  repeat ordered  cotninue Posaconazole  repeat chest CT non contrast    OI prophylaxis-  has been receiving high dose steroids since 5/20  restarted on valganciclovir  restarted on bactrim  CMV viral load to check as above    Gary Lujan MD  [phone]  After 5pm/weekends [phone]

## 2020-08-13 NOTE — PROGRESS NOTE ADULT - PROBLEM SELECTOR PLAN 2
s/p 1 unit PRBC overnight  Heme consult -called, Labs-LDH, Retic, Hapto, Direct coobs, lfts, b12, folate-ordered  GI Consult -Called. Pt with 1 tarry stool reported.  EGD today

## 2020-08-14 ENCOUNTER — APPOINTMENT (OUTPATIENT)
Dept: INFUSION THERAPY | Facility: HOSPITAL | Age: 70
End: 2020-08-14

## 2020-08-14 DIAGNOSIS — R78.81 BACTEREMIA: ICD-10-CM

## 2020-08-14 LAB
ALBUMIN SERPL ELPH-MCNC: 3.8 G/DL — SIGNIFICANT CHANGE UP (ref 3.3–5)
ALP SERPL-CCNC: 69 U/L — SIGNIFICANT CHANGE UP (ref 40–120)
ALT FLD-CCNC: 38 U/L — SIGNIFICANT CHANGE UP (ref 10–45)
ANION GAP SERPL CALC-SCNC: 14 MMOL/L — SIGNIFICANT CHANGE UP (ref 5–17)
APPEARANCE UR: CLEAR — SIGNIFICANT CHANGE UP
APTT BLD: 21.6 SEC — LOW (ref 27.5–35.5)
AST SERPL-CCNC: 40 U/L — SIGNIFICANT CHANGE UP (ref 10–40)
BACTERIA # UR AUTO: NEGATIVE — SIGNIFICANT CHANGE UP
BILIRUB SERPL-MCNC: 0.7 MG/DL — SIGNIFICANT CHANGE UP (ref 0.2–1.2)
BILIRUB UR-MCNC: NEGATIVE — SIGNIFICANT CHANGE UP
BUN SERPL-MCNC: 20 MG/DL — SIGNIFICANT CHANGE UP (ref 7–23)
CALCIUM SERPL-MCNC: 8.8 MG/DL — SIGNIFICANT CHANGE UP (ref 8.4–10.5)
CHLORIDE SERPL-SCNC: 101 MMOL/L — SIGNIFICANT CHANGE UP (ref 96–108)
CK MB BLD-MCNC: 4.4 % — HIGH (ref 0–3.5)
CK MB CFR SERPL CALC: 2.7 NG/ML — SIGNIFICANT CHANGE UP (ref 0–6.7)
CK MB CFR SERPL CALC: 3 NG/ML — SIGNIFICANT CHANGE UP (ref 0–6.7)
CK SERPL-CCNC: 61 U/L — SIGNIFICANT CHANGE UP (ref 30–200)
CO2 SERPL-SCNC: 23 MMOL/L — SIGNIFICANT CHANGE UP (ref 22–31)
COLOR SPEC: SIGNIFICANT CHANGE UP
CREAT SERPL-MCNC: 1.37 MG/DL — HIGH (ref 0.5–1.3)
CULTURE RESULTS: SIGNIFICANT CHANGE UP
DIFF PNL FLD: NEGATIVE — SIGNIFICANT CHANGE UP
EPI CELLS # UR: 1 /HPF — SIGNIFICANT CHANGE UP
GAS PNL BLDA: SIGNIFICANT CHANGE UP
GLUCOSE BLDC GLUCOMTR-MCNC: 106 MG/DL — HIGH (ref 70–99)
GLUCOSE BLDC GLUCOMTR-MCNC: 108 MG/DL — HIGH (ref 70–99)
GLUCOSE BLDC GLUCOMTR-MCNC: 113 MG/DL — HIGH (ref 70–99)
GLUCOSE BLDC GLUCOMTR-MCNC: 117 MG/DL — HIGH (ref 70–99)
GLUCOSE BLDC GLUCOMTR-MCNC: 119 MG/DL — HIGH (ref 70–99)
GLUCOSE BLDC GLUCOMTR-MCNC: 200 MG/DL — HIGH (ref 70–99)
GLUCOSE BLDC GLUCOMTR-MCNC: 232 MG/DL — HIGH (ref 70–99)
GLUCOSE SERPL-MCNC: 237 MG/DL — HIGH (ref 70–99)
GLUCOSE UR QL: ABNORMAL
GRAM STN FLD: SIGNIFICANT CHANGE UP
HCT VFR BLD CALC: 23.7 % — LOW (ref 39–50)
HCT VFR BLD CALC: 26.3 % — LOW (ref 39–50)
HGB BLD-MCNC: 7.4 G/DL — LOW (ref 13–17)
HGB BLD-MCNC: 8.3 G/DL — LOW (ref 13–17)
HYALINE CASTS # UR AUTO: 2 /LPF — SIGNIFICANT CHANGE UP (ref 0–2)
INR BLD: 1.04 RATIO — SIGNIFICANT CHANGE UP (ref 0.88–1.16)
K OXYTOCA DNA BLD POS QL NAA+NON-PROBE: SIGNIFICANT CHANGE UP
KETONES UR-MCNC: NEGATIVE — SIGNIFICANT CHANGE UP
LEUKOCYTE ESTERASE UR-ACNC: ABNORMAL
MAGNESIUM SERPL-MCNC: 1.9 MG/DL — SIGNIFICANT CHANGE UP (ref 1.6–2.6)
MCHC RBC-ENTMCNC: 31.2 GM/DL — LOW (ref 32–36)
MCHC RBC-ENTMCNC: 31.6 GM/DL — LOW (ref 32–36)
MCHC RBC-ENTMCNC: 32.2 PG — SIGNIFICANT CHANGE UP (ref 27–34)
MCHC RBC-ENTMCNC: 32.3 PG — SIGNIFICANT CHANGE UP (ref 27–34)
MCV RBC AUTO: 102.3 FL — HIGH (ref 80–100)
MCV RBC AUTO: 103 FL — HIGH (ref 80–100)
METHOD TYPE: SIGNIFICANT CHANGE UP
NITRITE UR-MCNC: NEGATIVE — SIGNIFICANT CHANGE UP
NRBC # BLD: 0 /100 WBCS — SIGNIFICANT CHANGE UP (ref 0–0)
NRBC # BLD: 3 /100 WBCS — HIGH (ref 0–0)
PH UR: 6.5 — SIGNIFICANT CHANGE UP (ref 5–8)
PHOSPHATE SERPL-MCNC: 3.5 MG/DL — SIGNIFICANT CHANGE UP (ref 2.5–4.5)
PLATELET # BLD AUTO: 153 K/UL — SIGNIFICANT CHANGE UP (ref 150–400)
PLATELET # BLD AUTO: 198 K/UL — SIGNIFICANT CHANGE UP (ref 150–400)
POTASSIUM SERPL-MCNC: 3.8 MMOL/L — SIGNIFICANT CHANGE UP (ref 3.5–5.3)
POTASSIUM SERPL-SCNC: 3.8 MMOL/L — SIGNIFICANT CHANGE UP (ref 3.5–5.3)
PROT SERPL-MCNC: 6 G/DL — SIGNIFICANT CHANGE UP (ref 6–8.3)
PROT UR-MCNC: SIGNIFICANT CHANGE UP
PROTHROM AB SERPL-ACNC: 12.3 SEC — SIGNIFICANT CHANGE UP (ref 10.6–13.6)
RBC # BLD: 2.3 M/UL — LOW (ref 4.2–5.8)
RBC # BLD: 2.57 M/UL — LOW (ref 4.2–5.8)
RBC # FLD: 18.7 % — HIGH (ref 10.3–14.5)
RBC # FLD: 18.8 % — HIGH (ref 10.3–14.5)
RBC CASTS # UR COMP ASSIST: 4 /HPF — SIGNIFICANT CHANGE UP (ref 0–4)
SODIUM SERPL-SCNC: 138 MMOL/L — SIGNIFICANT CHANGE UP (ref 135–145)
SP GR SPEC: 1.01 — LOW (ref 1.01–1.02)
SPECIMEN SOURCE: SIGNIFICANT CHANGE UP
TROPONIN T, HIGH SENSITIVITY RESULT: 25 NG/L — SIGNIFICANT CHANGE UP (ref 0–51)
TROPONIN T, HIGH SENSITIVITY RESULT: 36 NG/L — SIGNIFICANT CHANGE UP (ref 0–51)
UROBILINOGEN FLD QL: NEGATIVE — SIGNIFICANT CHANGE UP
WBC # BLD: 4.76 K/UL — SIGNIFICANT CHANGE UP (ref 3.8–10.5)
WBC # BLD: 6.23 K/UL — SIGNIFICANT CHANGE UP (ref 3.8–10.5)
WBC # FLD AUTO: 4.76 K/UL — SIGNIFICANT CHANGE UP (ref 3.8–10.5)
WBC # FLD AUTO: 6.23 K/UL — SIGNIFICANT CHANGE UP (ref 3.8–10.5)
WBC UR QL: 12 /HPF — HIGH (ref 0–5)

## 2020-08-14 PROCEDURE — 93306 TTE W/DOPPLER COMPLETE: CPT | Mod: 26

## 2020-08-14 PROCEDURE — 71045 X-RAY EXAM CHEST 1 VIEW: CPT | Mod: 26

## 2020-08-14 PROCEDURE — 99291 CRITICAL CARE FIRST HOUR: CPT

## 2020-08-14 PROCEDURE — 36556 INSERT NON-TUNNEL CV CATH: CPT

## 2020-08-14 PROCEDURE — 99233 SBSQ HOSP IP/OBS HIGH 50: CPT

## 2020-08-14 PROCEDURE — 71260 CT THORAX DX C+: CPT | Mod: 26

## 2020-08-14 PROCEDURE — 99232 SBSQ HOSP IP/OBS MODERATE 35: CPT | Mod: GC

## 2020-08-14 PROCEDURE — 74177 CT ABD & PELVIS W/CONTRAST: CPT | Mod: 26

## 2020-08-14 PROCEDURE — 36620 INSERTION CATHETER ARTERY: CPT

## 2020-08-14 RX ORDER — VALGANCICLOVIR 450 MG/1
450 TABLET, FILM COATED ORAL DAILY
Refills: 0 | Status: DISCONTINUED | OUTPATIENT
Start: 2020-08-14 | End: 2020-08-17

## 2020-08-14 RX ORDER — INSULIN LISPRO 100/ML
VIAL (ML) SUBCUTANEOUS
Refills: 0 | Status: DISCONTINUED | OUTPATIENT
Start: 2020-08-14 | End: 2020-08-30

## 2020-08-14 RX ORDER — POTASSIUM CHLORIDE 20 MEQ
10 PACKET (EA) ORAL
Refills: 0 | Status: COMPLETED | OUTPATIENT
Start: 2020-08-14 | End: 2020-08-14

## 2020-08-14 RX ORDER — POTASSIUM CHLORIDE 20 MEQ
20 PACKET (EA) ORAL ONCE
Refills: 0 | Status: COMPLETED | OUTPATIENT
Start: 2020-08-14 | End: 2020-08-14

## 2020-08-14 RX ORDER — ASPIRIN/CALCIUM CARB/MAGNESIUM 324 MG
81 TABLET ORAL DAILY
Refills: 0 | Status: DISCONTINUED | OUTPATIENT
Start: 2020-08-14 | End: 2020-08-14

## 2020-08-14 RX ORDER — VALGANCICLOVIR 450 MG/1
450 TABLET, FILM COATED ORAL DAILY
Refills: 0 | Status: DISCONTINUED | OUTPATIENT
Start: 2020-08-14 | End: 2020-08-14

## 2020-08-14 RX ORDER — ASPIRIN/CALCIUM CARB/MAGNESIUM 324 MG
81 TABLET ORAL DAILY
Refills: 0 | Status: DISCONTINUED | OUTPATIENT
Start: 2020-08-14 | End: 2020-08-18

## 2020-08-14 RX ORDER — OMEGA-3 ACID ETHYL ESTERS 1 G
2 CAPSULE ORAL
Refills: 0 | Status: DISCONTINUED | OUTPATIENT
Start: 2020-08-14 | End: 2020-08-25

## 2020-08-14 RX ORDER — SODIUM CHLORIDE 9 MG/ML
500 INJECTION INTRAMUSCULAR; INTRAVENOUS; SUBCUTANEOUS ONCE
Refills: 0 | Status: COMPLETED | OUTPATIENT
Start: 2020-08-14 | End: 2020-08-14

## 2020-08-14 RX ORDER — MEROPENEM 1 G/30ML
1000 INJECTION INTRAVENOUS EVERY 8 HOURS
Refills: 0 | Status: DISCONTINUED | OUTPATIENT
Start: 2020-08-14 | End: 2020-08-17

## 2020-08-14 RX ORDER — VASOPRESSIN 20 [USP'U]/ML
0.08 INJECTION INTRAVENOUS
Qty: 50 | Refills: 0 | Status: DISCONTINUED | OUTPATIENT
Start: 2020-08-14 | End: 2020-08-14

## 2020-08-14 RX ORDER — INSULIN HUMAN 100 [IU]/ML
1 INJECTION, SOLUTION SUBCUTANEOUS
Qty: 100 | Refills: 0 | Status: DISCONTINUED | OUTPATIENT
Start: 2020-08-14 | End: 2020-08-14

## 2020-08-14 RX ORDER — INSULIN LISPRO 100/ML
VIAL (ML) SUBCUTANEOUS
Refills: 0 | Status: DISCONTINUED | OUTPATIENT
Start: 2020-08-14 | End: 2020-08-14

## 2020-08-14 RX ORDER — ACETAMINOPHEN 500 MG
1000 TABLET ORAL ONCE
Refills: 0 | Status: COMPLETED | OUTPATIENT
Start: 2020-08-14 | End: 2020-08-14

## 2020-08-14 RX ORDER — INSULIN LISPRO 100/ML
5 VIAL (ML) SUBCUTANEOUS
Refills: 0 | Status: DISCONTINUED | OUTPATIENT
Start: 2020-08-14 | End: 2020-08-25

## 2020-08-14 RX ORDER — VANCOMYCIN HCL 1 G
750 VIAL (EA) INTRAVENOUS EVERY 12 HOURS
Refills: 0 | Status: DISCONTINUED | OUTPATIENT
Start: 2020-08-14 | End: 2020-08-14

## 2020-08-14 RX ORDER — ENOXAPARIN SODIUM 100 MG/ML
40 INJECTION SUBCUTANEOUS DAILY
Refills: 0 | Status: DISCONTINUED | OUTPATIENT
Start: 2020-08-14 | End: 2020-08-23

## 2020-08-14 RX ORDER — INSULIN GLARGINE 100 [IU]/ML
14 INJECTION, SOLUTION SUBCUTANEOUS AT BEDTIME
Refills: 0 | Status: DISCONTINUED | OUTPATIENT
Start: 2020-08-14 | End: 2020-08-25

## 2020-08-14 RX ADMIN — Medication 650 MILLIGRAM(S): at 18:14

## 2020-08-14 RX ADMIN — PANTOPRAZOLE SODIUM 40 MILLIGRAM(S): 20 TABLET, DELAYED RELEASE ORAL at 05:15

## 2020-08-14 RX ADMIN — POSACONAZOLE 300 MILLIGRAM(S): 100 TABLET, DELAYED RELEASE ORAL at 16:04

## 2020-08-14 RX ADMIN — AMIODARONE HYDROCHLORIDE 33.3 MG/MIN: 400 TABLET ORAL at 00:25

## 2020-08-14 RX ADMIN — SODIUM CHLORIDE 3 MILLILITER(S): 9 INJECTION INTRAMUSCULAR; INTRAVENOUS; SUBCUTANEOUS at 13:41

## 2020-08-14 RX ADMIN — SODIUM CHLORIDE 500 MILLILITER(S): 9 INJECTION INTRAMUSCULAR; INTRAVENOUS; SUBCUTANEOUS at 14:25

## 2020-08-14 RX ADMIN — Medication 2: at 08:15

## 2020-08-14 RX ADMIN — Medication 20 MILLIEQUIVALENT(S): at 07:00

## 2020-08-14 RX ADMIN — Medication 650 MILLIGRAM(S): at 05:16

## 2020-08-14 RX ADMIN — VALGANCICLOVIR 450 MILLIGRAM(S): 450 TABLET, FILM COATED ORAL at 13:29

## 2020-08-14 RX ADMIN — SODIUM CHLORIDE 3 MILLILITER(S): 9 INJECTION INTRAMUSCULAR; INTRAVENOUS; SUBCUTANEOUS at 05:01

## 2020-08-14 RX ADMIN — Medication 1000 UNIT(S): at 13:28

## 2020-08-14 RX ADMIN — SODIUM CHLORIDE 30 MILLILITER(S): 9 INJECTION INTRAMUSCULAR; INTRAVENOUS; SUBCUTANEOUS at 08:15

## 2020-08-14 RX ADMIN — ENOXAPARIN SODIUM 40 MILLIGRAM(S): 100 INJECTION SUBCUTANEOUS at 13:28

## 2020-08-14 RX ADMIN — Medication 2 GRAM(S): at 18:15

## 2020-08-14 RX ADMIN — Medication 20 MILLIGRAM(S): at 21:18

## 2020-08-14 RX ADMIN — AMIODARONE HYDROCHLORIDE 600 MILLIGRAM(S): 400 TABLET ORAL at 00:07

## 2020-08-14 RX ADMIN — Medication 1 TABLET(S): at 13:29

## 2020-08-14 RX ADMIN — Medication 400 MILLIGRAM(S): at 14:33

## 2020-08-14 RX ADMIN — Medication 81 MILLIGRAM(S): at 13:29

## 2020-08-14 RX ADMIN — Medication 250 MILLIGRAM(S): at 05:15

## 2020-08-14 RX ADMIN — MAGNESIUM OXIDE 400 MG ORAL TABLET 400 MILLIGRAM(S): 241.3 TABLET ORAL at 13:29

## 2020-08-14 RX ADMIN — INSULIN GLARGINE 14 UNIT(S): 100 INJECTION, SOLUTION SUBCUTANEOUS at 22:00

## 2020-08-14 RX ADMIN — Medication 50 MILLIEQUIVALENT(S): at 01:00

## 2020-08-14 RX ADMIN — Medication 2 GRAM(S): at 05:16

## 2020-08-14 RX ADMIN — CEFEPIME 100 MILLIGRAM(S): 1 INJECTION, POWDER, FOR SOLUTION INTRAMUSCULAR; INTRAVENOUS at 05:15

## 2020-08-14 RX ADMIN — MEROPENEM 100 MILLIGRAM(S): 1 INJECTION INTRAVENOUS at 21:19

## 2020-08-14 RX ADMIN — Medication 50 MILLIEQUIVALENT(S): at 00:35

## 2020-08-14 RX ADMIN — EVEROLIMUS 0.75 MILLIGRAM(S): 10 TABLET ORAL at 08:52

## 2020-08-14 RX ADMIN — Medication 1000 MILLIGRAM(S): at 15:03

## 2020-08-14 RX ADMIN — EVEROLIMUS 0.75 MILLIGRAM(S): 10 TABLET ORAL at 20:18

## 2020-08-14 RX ADMIN — Medication 1 MILLIGRAM(S): at 13:29

## 2020-08-14 RX ADMIN — INSULIN HUMAN 1 UNIT(S)/HR: 100 INJECTION, SOLUTION SUBCUTANEOUS at 10:56

## 2020-08-14 RX ADMIN — PANTOPRAZOLE SODIUM 40 MILLIGRAM(S): 20 TABLET, DELAYED RELEASE ORAL at 18:15

## 2020-08-14 RX ADMIN — SODIUM CHLORIDE 3 MILLILITER(S): 9 INJECTION INTRAMUSCULAR; INTRAVENOUS; SUBCUTANEOUS at 21:18

## 2020-08-14 RX ADMIN — AMIODARONE HYDROCHLORIDE 33.3 MG/MIN: 400 TABLET ORAL at 08:16

## 2020-08-14 RX ADMIN — MEROPENEM 100 MILLIGRAM(S): 1 INJECTION INTRAVENOUS at 13:44

## 2020-08-14 RX ADMIN — Medication 40 MILLIGRAM(S): at 05:16

## 2020-08-14 NOTE — CHART NOTE - NSCHARTNOTEFT_GEN_A_CORE
Risks of video capsule endoscopy explained, including risk of retained capsule, potentially requiring surgery for removal.  Patient understands and agrees to risks.    Capsule ID: [id number]    Capsule swallowed at: 9:45am    NPO now.  Resume Clear liquid diet at:  11:45am  Resume regular consistency diet at: 1:45pm    Equipment can be removed by nursing staff at: 6:00pm    GI fellow will retrieve equipment in the morning.    NO MRI UNTIL CAPSULE CLEARANCE CONFIRMED. CAPSULE IS NOT MRI COMPATIBLE.

## 2020-08-14 NOTE — PROGRESS NOTE ADULT - ASSESSMENT
Impression:   71 yo M w/ PMhx NICM s/p heart transplant (2018) complicated by prior antibody mediated rejection (s/p IVIG, plasmapharesis, rituximab), HCV s/p treatment, CKD, history of small bowel angiectasis (2018) treated w/ APC, multiple recent admission for hemolytic anemia (4/29-5/7; 5/22-6/2) treated w/ steroids, IVIG, rituximab, presenting w/ melena x 4 days, s/p EGD w/o identifiable source; hospital course complicated by supraventricular tachycardia and hypotension and gram negative bacteremia    # anemia/melena - patient's anemia likely multifactorial. Likely has component of acute GIB given history of melena. Etiology of GIB likely angioectasias given history, but also possible PUD, dieulafoy, mass/polyp, duodenitis/gastritis/esophagitis, small bowel angioectasia, right sided colonic lesions. Likely also has component of bone marrow suppression from everolimus, as patient also leukopenic. His macrocytic anemia can be from the bone marrow suppression or nutritional deficiencies. Lastly, given history of hemolysis, may have hemolysis, but haptoglobin wnl. S/p EGD w/o identifiable source, unable to tolerate bowel preparation for colonoscopy    # bacteremia - patient's positive blood cultures for gram negative bacteremia, appear to have been drawn prior to endoscopy so unlikely a complication from procedure.     Recommendations:  - plan for capsule endoscopy today to evaluate small bowel for source of bleed. Please see separate note for capsule endoscopy instructions  - pending results of capsule endoscopy, will discuss risks/benefits of further intervention (repeat push vs colonoscopy)  - trend CBC q12, transfuse to 8 given cardiac history  - c/w aspirin  - PPI 40 IV BID for now  - abx per primary team for bacteremia, agree w/ CT abdomen  - arrythmia management per cardiology     Thank you for this interesting consult. GI will continue to follow.     Ruben Causey, PGY4  Gastroenterology Fellow  Available on [employer] Teams  [phone] ([employer] Short Range Pager)  [phone] (Long Range Pager)    After 5pm, please contact the on-call GI fellow. [phone]

## 2020-08-14 NOTE — PROGRESS NOTE ADULT - ASSESSMENT
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 He has responded appropriately to a single blood transfusion. Of note, he recently has developed dark colored stools. Will investigate if anemia is due to GI bleeding in setting of steroid use or hemolysis and manage appropriately. Underwent UGI without bleeding source identified  Developed gram negative sepsis overnight- transferred to CTU      Sepsis:  Klebsiella oxytoca growing in blood cultures 2/2 sets  Unclear if source was urinary tract vs GI tract vs related to his UGI procedure  Repeat blood cultures x2 sets sent and pending  Continue the Meropenem  follow up organism sensitivities and adjust antibiotics accordingly  Would image chest , abdomen, pelvis with CT scan     Anemia- r/o GI bleeding    UGI non revealing  Colonoscopy on hold given sepsis        Pancytopenia- on admission  check CMV viral load  repeat fungitell and galactomannan pending this admission  would repeat chest CT scan to see status of prior RUL scarring vs. nodule    Fungitell-  moderately elevated in May and non detectable on more recent June admission  repeat ordered  cotninue Posaconazole  repeat chest CT non contrast    OI prophylaxis-  has been receiving high dose steroids since 5/20  restarted on valganciclovir  restarted on bactrim  CMV viral load to check as above    Gary Lujan MD  [phone]  After 5pm/weekends [phone]

## 2020-08-14 NOTE — PROGRESS NOTE ADULT - SUBJECTIVE AND OBJECTIVE BOX
Patient seen and examined at bedside.    Overnight Events: O/n, pt w/ sig resp distress and was xferred to CTICU. Also w/ ep of HTN and tachycardia. This AM, pt denies any complaints. Bcxs now pos w/ GNR.    Review Of Systems: No chest pain, shortness of breath, or palpitations            Current Meds:  aspirin enteric coated 81 milliGRAM(s) Oral daily  cholecalciferol 1000 Unit(s) Oral daily  dextrose 40% Gel 15 Gram(s) Oral once PRN  dextrose 5%. 1000 milliLiter(s) IV Continuous <Continuous>  dextrose 50% Injectable 12.5 Gram(s) IV Push once  enoxaparin Injectable 40 milliGRAM(s) SubCutaneous daily  everolimus (ZORTRESS) 0.75 milliGRAM(s) Oral <User Schedule>  folic acid 1 milliGRAM(s) Oral daily  glucagon  Injectable 1 milliGRAM(s) IntraMuscular once PRN  insulin regular Infusion 1 Unit(s)/Hr IV Continuous <Continuous>  magnesium oxide 400 milliGRAM(s) Oral daily  meropenem  IVPB 1000 milliGRAM(s) IV Intermittent every 8 hours  omega-3-Acid Ethyl Esters 2 Gram(s) Oral two times a day  pantoprazole  Injectable 40 milliGRAM(s) IV Push every 12 hours  posaconazole DR Tablet 300 milliGRAM(s) Oral daily  pravastatin 20 milliGRAM(s) Oral at bedtime  predniSONE   Tablet 40 milliGRAM(s) Oral daily  sodium bicarbonate 650 milliGRAM(s) Oral two times a day  sodium chloride 0.9% Bolus 500 milliLiter(s) IV Bolus once  sodium chloride 0.9% lock flush 3 milliLiter(s) IV Push every 8 hours  sodium chloride 0.9%. 1000 milliLiter(s) IV Continuous <Continuous>  trimethoprim   80 mG/sulfamethoxazole 400 mG 1 Tablet(s) Oral daily  valGANciclovir 450 milliGRAM(s) Oral daily      Vitals:  T(F): 97.9 (08-14), Max: 99.9 (08-14)  HR: 107 (08-14) (105 - 189)  BP: 137/91 (08-13) (106/86 - 194/124)  RR: 17 (08-14)  SpO2: 100% (08-14)  I&O's Summary    13 Aug 2020 07:01  -  14 Aug 2020 07:00  --------------------------------------------------------  IN: 1435.2 mL / OUT: 1805 mL / NET: -369.8 mL    14 Aug 2020 07:01  -  14 Aug 2020 13:08  --------------------------------------------------------  IN: 162.9 mL / OUT: 680 mL / NET: -517.1 mL        Physical Exam:  Gen: NAD.  HEENT: NCAT. PERRLA b/l.  Neck: No JVP elev.  CV: Normal S1, S2. RRR. No MRG.  Chest: CTAB. No WRR.  Abd: +BSx4. Soft. NTND.  Ext: No LE edema.  Skin: No cyanosis.                          8.3    6.23  )-----------( 198      ( 13 Aug 2020 23:51 )             26.3     08-13    138  |  101  |  20  ----------------------------<  237<H>  3.8   |  23  |  1.37<H>    Ca    8.8      13 Aug 2020 23:51  Phos  3.5     08-13  Mg     1.9     08-13    TPro  6.0  /  Alb  3.8  /  TBili  0.7  /  DBili  x   /  AST  40  /  ALT  38  /  AlkPhos  69  08-13    PT/INR - ( 13 Aug 2020 23:51 )   PT: 12.3 sec;   INR: 1.04 ratio         PTT - ( 13 Aug 2020 23:51 )  PTT:21.6 sec  CARDIAC MARKERS ( 13 Aug 2020 23:51 )  36 ng/L / x     / x     / 61 U/L / x     / 2.7 ng/mL  CARDIAC MARKERS ( 13 Aug 2020 22:53 )  25 ng/L / x     / x     / 78 U/L / x     / 3.0 ng/mL      Serum Pro-Brain Natriuretic Peptide: 736 pg/mL (08-11 @ 22:28)

## 2020-08-14 NOTE — PROGRESS NOTE ADULT - SUBJECTIVE AND OBJECTIVE BOX
Chief Complaint:  Patient is a 70y old  Male who presents with a chief complaint of SOB/anemia (13 Aug 2020 23:49)      Interval Events: yesterday patient underwent EGD w/ push enteroscopy for melena, no source identified. Tolerated procedure well. Overnight, patient began prep for colonoscopy, and then developed tachycardia and hypotension, treated w/ adenosine, amio, uptriaged to CTU where HR stabilized. Patient mentating well throughout. Blood cultures from yesterday (looks like drawn prior to endoscopy) returned positive for gram negative rods.   Patient states one dark bowel movement overnight, no abd pain, nothing to eat since having small amount of prep.     Allergies:  No Known Allergies      Hospital Medications:  aspirin enteric coated 81 milliGRAM(s) Oral daily  cefepime   IVPB 1000 milliGRAM(s) IV Intermittent every 8 hours  cholecalciferol 1000 Unit(s) Oral daily  dextrose 40% Gel 15 Gram(s) Oral once PRN  dextrose 5%. 1000 milliLiter(s) IV Continuous <Continuous>  dextrose 50% Injectable 12.5 Gram(s) IV Push once  enoxaparin Injectable 40 milliGRAM(s) SubCutaneous daily  everolimus (ZORTRESS) 0.75 milliGRAM(s) Oral <User Schedule>  folic acid 1 milliGRAM(s) Oral daily  glucagon  Injectable 1 milliGRAM(s) IntraMuscular once PRN  insulin regular Infusion 1 Unit(s)/Hr IV Continuous <Continuous>  magnesium oxide 400 milliGRAM(s) Oral daily  omega-3-Acid Ethyl Esters 2 Gram(s) Oral two times a day  pantoprazole  Injectable 40 milliGRAM(s) IV Push every 12 hours  posaconazole DR Tablet 300 milliGRAM(s) Oral daily  pravastatin 20 milliGRAM(s) Oral at bedtime  predniSONE   Tablet 40 milliGRAM(s) Oral daily  sodium bicarbonate 650 milliGRAM(s) Oral two times a day  sodium chloride 0.9% lock flush 3 milliLiter(s) IV Push every 8 hours  sodium chloride 0.9%. 1000 milliLiter(s) IV Continuous <Continuous>  trimethoprim   80 mG/sulfamethoxazole 400 mG 1 Tablet(s) Oral daily  valGANciclovir 450 milliGRAM(s) Oral daily        PHYSICAL EXAM:   Vital Signs:  Vital Signs Last 24 Hrs  T(C): 36.6 (14 Aug 2020 07:00), Max: 37.7 (13 Aug 2020 23:00)  T(F): 97.9 (14 Aug 2020 07:00), Max: 99.9 (14 Aug 2020 00:00)  HR: 107 (14 Aug 2020 10:00) (105 - 189)  BP: 137/91 (13 Aug 2020 23:00) (106/86 - 194/124)  BP(mean): 109 (13 Aug 2020 23:00) (109 - 147)  RR: 12 (14 Aug 2020 10:00) (11 - 35)  SpO2: 100% (14 Aug 2020 10:00) (89% - 100%)  Daily Height in cm: 170.18 (13 Aug 2020 13:59)    Daily Weight in k.2 (14 Aug 2020 00:00)    GENERAL:  No acute distress  HEENT:  Normocephalic/atraumatic, no scleral icterus  CHEST:  Clear to auscultation bilaterally, no wheezes/rales/ronchi, no accessory muscle use  HEART:  Regular rate and rhythm, no murmurs/rubs/gallops  ABDOMEN:  surgical scars c/d/i, soft, nontender, no r/g  EXTREMITIES: No cyanosis, clubbing, or edema  SKIN:  No rash/erythema/ecchymoses/petechiae/wounds/abscess/warm/dry  NEURO:  Alert and oriented x 3, no asterixis    LABS:                        8.3    6.23  )-----------( 198      ( 13 Aug 2020 23:51 )             26.3     Mean Cell Volume: 102.3 fl (20 @ 23:51)    08-    138  |  101  |  20  ----------------------------<  237<H>  3.8   |  23  |  1.37<H>    Ca    8.8      13 Aug 2020 23:51  Phos  3.5     08-  Mg     1.9         TPro  6.0  /  Alb  3.8  /  TBili  0.7  /  DBili  x   /  AST  40  /  ALT  38  /  AlkPhos  69  08-13    LIVER FUNCTIONS - ( 13 Aug 2020 23:51 )  Alb: 3.8 g/dL / Pro: 6.0 g/dL / ALK PHOS: 69 U/L / ALT: 38 U/L / AST: 40 U/L / GGT: x           PT/INR - ( 13 Aug 2020 23:51 )   PT: 12.3 sec;   INR: 1.04 ratio         PTT - ( 13 Aug 2020 23:51 )  PTT:21.6 sec  Urinalysis Basic - ( 14 Aug 2020 09:48 )    Color: Light Yellow / Appearance: Clear / S.008 / pH: x  Gluc: x / Ketone: Negative  / Bili: Negative / Urobili: Negative   Blood: x / Protein: Trace / Nitrite: Negative   Leuk Esterase: Large / RBC: 4 /hpf / WBC 12 /HPF   Sq Epi: x / Non Sq Epi: 1 /hpf / Bacteria: Negative      Enteroscopy (20)  Findings:       The examined esophagus was normal.       The entire examined stomach was normal.       The examined duodenum was normal.       The examined jejunum was normal.                                                                                                        Impression:          - Normal esophagus.           - Normal stomach.                       - Normal examined duodenum.                       - Normal examined jejunum.                       - No specimens collected.                       - No source of melena identified.

## 2020-08-14 NOTE — PROGRESS NOTE ADULT - ASSESSMENT
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 He has responded appropriately to a single blood transfusion. Of note, he recently has developed dark colored stools. Labs overall not consistent with hemolysis. We are investigating for possible gastrointestinal bleeding as cause of blood loss.     Review of pertinent studies  8/2020 labs: Direct Jacky +, 4.6% reticulocytes with low RI, normal bilirubin,  (stable). Haptoglobin normal. 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 He has responded appropriately to a single blood transfusion. Labs were not consistent with hemolysis and he reported dark stools for which EGD/enteroscopy was performed which did not reveal source of bleeding with VCE pending. He developed acute respiratory distress and profound sinus tachycardia on 8/13 in setting of Klebsiella bacteremia of unclear origin (preceded EGD) for which CT is pending. He has clinically improved with antibiotics.       Review of pertinent studies  8/2020 labs: Direct Jacky +, 4.6% reticulocytes with low RI, normal bilirubin,  (stable). Haptoglobin normal.

## 2020-08-14 NOTE — PROGRESS NOTE ADULT - PROBLEM SELECTOR PLAN 4
-Pt w/ GNR on bcxs  -Await ID input  -Agree w/ broadening to meropenem  -Can likely hold vanco given GNR  -Serial bcxs until clear  -F/u UCx; obtain if not done already  -Would get CT C/A/P w/ PO contrast to eval for poss source - Klebsiella bacteremia from 8/13, daily blood cultures until cleared  - CT C/A/P pending  - ID recs appreciated  - Continue meropenem

## 2020-08-14 NOTE — CHART NOTE - NSCHARTNOTEFT_GEN_A_CORE
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     Cardiology was contacted for a STAT echo overnight as the patient was hypertensive and tachycardic. Unfortunately due to technical difficulties, the images are unable to be uploaded overnight, however images were reviewed with the overnight Echo attending.     Brief conclusion:  1. No pericardial effusion  2. No significant valve disease  3. No LV or RV dysfunction    Full report to follow in the morning. Case was discussed with Dr. Shar Whitney.    Aarti Fonseca MD  Cardiology Fellow, PGY-4  [phone]

## 2020-08-14 NOTE — PROGRESS NOTE ADULT - SUBJECTIVE AND OBJECTIVE BOX
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 Patient was treated w/ prednisone and Rituximab. Tacro was discontinued and patient was also transitioned to everolimus  Admitted  6/16-6/19  for hyperglycemia.  Reported to transplant team having one done black tarry stool-  instructed to  present to [facility] for hemolytic anemia. Patient has been following with Hematology outpatient.  Denies CP  N/V diarrhea SOB. (11 Aug 2020 20:08)      Surgery/Hospital Course:  8/11 Admitted to [facility] with symptomatic anemia    Today:  no acute events    ICU Vital Signs Last 24 Hrs  T(C): 36.6 (14 Aug 2020 07:00), Max: 37.7 (13 Aug 2020 23:00)  T(F): 97.9 (14 Aug 2020 07:00), Max: 99.9 (14 Aug 2020 00:00)  HR: 110 (14 Aug 2020 09:00) (105 - 189)  BP: 137/91 (13 Aug 2020 23:00) (106/86 - 194/124)  BP(mean): 109 (13 Aug 2020 23:00) (109 - 147)  ABP: 145/76 (14 Aug 2020 09:00) (103/51 - 202/68)  ABP(mean): 98 (14 Aug 2020 09:00) (68 - 120)  RR: 20 (14 Aug 2020 09:00) (11 - 35)  SpO2: 100% (14 Aug 2020 09:00) (89% - 100%)      Physical Exam:  Gen:  Awake, alert   CNS: non focal 	  Neck: no JVD  RES : clear , no wheezing              CVS: Sinus tachycardia. Normal S1/S2  Abd: Soft, non-distended. Bowel sounds present.  Skin: No rash.  Ext:  no edema    ============================I/O===========================   I&O's Detail    13 Aug 2020 07:01  -  14 Aug 2020 07:00  --------------------------------------------------------  IN:    amiodarone Infusion: 266.4 mL    dexmedetomidine Infusion: 63.8 mL    IV PiggyBack: 800 mL    Oral Fluid: 220 mL    sodium chloride 0.9%.: 80 mL    vasopressin Infusion: 5 mL  Total IN: 1435.2 mL    OUT:    Indwelling Catheter - Urethral: 605 mL    Voided: 1200 mL  Total OUT: 1805 mL    Total NET: -369.8 mL      14 Aug 2020 07:01  -  14 Aug 2020 10:07  --------------------------------------------------------  IN:    amiodarone Infusion: 99.9 mL    sodium chloride 0.9%.: 30 mL  Total IN: 129.9 mL    OUT:    Indwelling Catheter - Urethral: 305 mL  Total OUT: 305 mL    Total NET: -175.1 mL        ============================ LABS =========================                        8.3    6.23  )-----------( 198      ( 13 Aug 2020 23:51 )             26.3     08-13    138  |  101  |  20  ----------------------------<  237<H>  3.8   |  23  |  1.37<H>    Ca    8.8      13 Aug 2020 23:51  Phos  3.5     08-13  Mg     1.9     08-13    TPro  6.0  /  Alb  3.8  /  TBili  0.7  /  DBili  x   /  AST  40  /  ALT  38  /  AlkPhos  69  08-13    LIVER FUNCTIONS - ( 13 Aug 2020 23:51 )  Alb: 3.8 g/dL / Pro: 6.0 g/dL / ALK PHOS: 69 U/L / ALT: 38 U/L / AST: 40 U/L / GGT: x           PT/INR - ( 13 Aug 2020 23:51 )   PT: 12.3 sec;   INR: 1.04 ratio         PTT - ( 13 Aug 2020 23:51 )  PTT:21.6 sec  ABG - ( 14 Aug 2020 06:37 )  pH, Arterial: 7.46  pH, Blood: x     /  pCO2: 36    /  pO2: 79    / HCO3: 26    / Base Excess: 2.2   /  SaO2: 97                  ======================Micro/Rad/Cardio=================  Culture: Reviewed   CXR: Reviewed  Echo:Reviewed  ======================================================  PAST MEDICAL & SURGICAL HISTORY:  H/O hemolytic anemia  H/O autoimmune hemolytic anemia  Knee pain, right  HLD (hyperlipidemia)  Former smoker  DVT of upper extremity (deep vein thrombosis)  Hepatitis C virus  GIB (gastrointestinal bleeding)  Ventricular fibrillation: s/p AICD  PAF (paroxysmal atrial fibrillation): on xarelto  Non-Ischemic Cardiomyopathy  SVT (Supraventricular Tachycardia)  HTN  CHF (Congestive Heart Failure)  S/P right heart catheterization: biopsy multiple  H/O heart transplant: 2/2018  Status post left hip replacement  History of Prior Ablation Treatment: for afib  AICD (Automatic Cardioverter/Defibrillator) Present: [employer] with 1 [employer] lead4/1/09- explanted and replaced with [employer] 2 leads on 9/2/09    ====================ASSESSMENT ==============  Heart transplant 2/2018 for ACC/AHA stage D NICM   Supraventricular tachycardia  severe hypertension  Klebsiella oxytosis   Sepsis  Resolving acute respiratory failure   Hyperlactatemia acidosis  Leukopenia    Plan:  ====================== NEUROLOGY=====================  Nonfocal, continue to monitor neuro status    ==================== RESPIRATORY======================  No longer requiring noninvasive ventilator for resolving respiratory failure secondary to sepsis, requires investigation for sepsis     ====================CARDIOVASCULAR==================  Currently sinus tachycardic S/p IV Amiodarone for Supraventricular tachycardia   ASA/Statin for history of HLD and TCAD prophylaxis  AICD present  Tapering prednisone as per Heme    pravastatin 20 milliGRAM(s) Oral at bedtime  predniSONE   Tablet 40 milliGRAM(s) Oral daily  aspirin enteric coated 81 milliGRAM(s) Oral daily    ===================HEMATOLOGIC/ONC ===================  Anemia s/p single blood transfusion   Monitor H&H     VTE prophylaxis, enoxaparin Injectable 40 milliGRAM(s) SubCutaneous daily    ===================== RENAL =========================  Continue monitoring urine output, I&Os, BUN/Cr    ==================== GASTROINTESTINAL===================  S/p EGD for investigation of tarry stools     cholecalciferol 1000 Unit(s) Oral daily  dextrose 5%. 1000 milliLiter(s) (50 mL/Hr) IV Continuous <Continuous>  folic acid 1 milliGRAM(s) Oral daily  magnesium oxide 400 milliGRAM(s) Oral daily  pantoprazole  Injectable 40 milliGRAM(s) IV Push every 12 hours  sodium bicarbonate 650 milliGRAM(s) Oral two times a day  sodium chloride 0.9% lock flush 3 milliLiter(s) IV Push every 8 hours  sodium chloride 0.9%. 1000 milliLiter(s) (30 mL/Hr) IV Continuous <Continuous>    =======================    ENDOCRINE  =====================  Hyperglycemia requiring humalog sliding scale, insulin gtt, and glucagon prn     dextrose 40% Gel 15 Gram(s) Oral once PRN Blood Glucose LESS THAN 70 milliGRAM(s)/deciLiter  dextrose 50% Injectable 12.5 Gram(s) IV Push once  glucagon  Injectable 1 milliGRAM(s) IntraMuscular once PRN Glucose LESS THAN 70 milligrams/deciliter  insulin lispro (HumaLOG) corrective regimen sliding scale   SubCutaneous three times a day before meals  insulin lispro (HumaLOG) corrective regimen sliding scale   SubCutaneous at bedtime  insulin regular Infusion 1 Unit(s)/Hr (1 mL/Hr) IV Continuous <Continuous>    ========================INFECTIOUS DISEASE================  +UTI   Bacteremia and sepsis requiring IV antibiotics   WBC 6     cefepime   IVPB 1000 milliGRAM(s) IV Intermittent every 8 hours  posaconazole DR Tablet 300 milliGRAM(s) Oral daily  trimethoprim   80 mG/sulfamethoxazole 400 mG 1 Tablet(s) Oral daily  valGANciclovir 450 milliGRAM(s) Oral daily    Patient requires continuous monitoring with bedside rhythm monitoring, pulse ox monitoring, and intermittent blood gas analysis. Care plan discussed with ICU care team. Patient remained critical and required more than usual post op care.    By signing my name below, I, Breanna Newell, attest that this documentation has been prepared under the direction and in the presence of Jeremiah Deshpande MD   Electronically signed: Katty Ramírez, 08-14-20 @ 10:07    I, Jeremiah Deshpande , personally performed the services described in this documentation. all medical record entries made by the ramuibkurt were at my direction and in my presence. I have reviewed the chart and agree that the record reflects my personal performance and is accurate and complete  Electronically signed: Jeremiah Deshpande MD

## 2020-08-14 NOTE — PROGRESS NOTE ADULT - PROBLEM SELECTOR PLAN 2
-Appreciate heme recs, no signs of hemolysis at this time  -Tapering prednisone per hematology: 40 mg daily followed by 10 mg decrease every 10 days  -EGD unremarkable; await VCE  -Will hold on Cscope given resp distress yest -Appreciate heme recs, no signs of hemolysis at this time  -Tapering prednisone per hematology: 40 mg daily followed by 10 mg decrease every 10 days  -EGD unremarkable; await VCE  -Possible c-scope pending clinical status

## 2020-08-14 NOTE — PROGRESS NOTE ADULT - PROBLEM SELECTOR PLAN 1
-C/w ASA/statin for TCAD ppx  -C/w everolimus 0.75mg BID, will follow levels periodically while hospitalized. Goal 8-10  -C/w Bactrim SS, Valtrex, posiconazole for ppx  -Off cellcept while on high dose steroids. -C/w ASA/statin for TCAD ppx  -C/w everolimus 0.75mg BID, will follow levels periodically while hospitalized (level 8/14 in lab). Goal 8-10  -Continue high dose prednisone which is slowly weaned as below   -C/w Bactrim SS, Valtrex, posiconazole for ppx  -Off cellcept while on high dose steroids.

## 2020-08-14 NOTE — PROGRESS NOTE ADULT - SUBJECTIVE AND OBJECTIVE BOX
INFECTIOUS DISEASES FOLLOW UP-- Sujey Lujan  [phone]    This is a follow up note for this  70yMale with  Anemia  s/p OHTx   admitted for elective endoscopy but developed gram negative bacteremia/sepsis- source urine vs GI tract vs [facility]-procedure  required transfer to the CTU overnight      ROS:  CONSTITUTIONAL:  awake and alert nasal oxygen  CARDIOVASCULAR:  No chest pain or palpitations  RESPIRATORY:  No dyspnea  GASTROINTESTINAL:  No nausea, vomiting, diarrhea, or abdominal pain  GENITOURINARY:  No dysuria  NEUROLOGIC:  No headache,     Allergies    No Known Allergies    Intolerances        ANTIBIOTICS/RELEVANT:  antimicrobials  meropenem  IVPB 1000 milliGRAM(s) IV Intermittent every 8 hours  posaconazole DR Tablet 300 milliGRAM(s) Oral daily  trimethoprim   80 mG/sulfamethoxazole 400 mG 1 Tablet(s) Oral daily  valGANciclovir 450 milliGRAM(s) Oral daily    immunologic:  everolimus (ZORTRESS) 0.75 milliGRAM(s) Oral <User Schedule>    OTHER:  aspirin enteric coated 81 milliGRAM(s) Oral daily  cholecalciferol 1000 Unit(s) Oral daily  dextrose 40% Gel 15 Gram(s) Oral once PRN  dextrose 5%. 1000 milliLiter(s) IV Continuous <Continuous>  dextrose 50% Injectable 12.5 Gram(s) IV Push once  enoxaparin Injectable 40 milliGRAM(s) SubCutaneous daily  folic acid 1 milliGRAM(s) Oral daily  glucagon  Injectable 1 milliGRAM(s) IntraMuscular once PRN  insulin glargine Injectable (LANTUS) 14 Unit(s) SubCutaneous at bedtime  insulin lispro (HumaLOG) corrective regimen sliding scale   SubCutaneous Before meals and at bedtime  insulin regular Infusion 1 Unit(s)/Hr IV Continuous <Continuous>  magnesium oxide 400 milliGRAM(s) Oral daily  omega-3-Acid Ethyl Esters 2 Gram(s) Oral two times a day  pantoprazole  Injectable 40 milliGRAM(s) IV Push every 12 hours  pravastatin 20 milliGRAM(s) Oral at bedtime  predniSONE   Tablet 40 milliGRAM(s) Oral daily  sodium bicarbonate 650 milliGRAM(s) Oral two times a day  sodium chloride 0.9% lock flush 3 milliLiter(s) IV Push every 8 hours  sodium chloride 0.9%. 1000 milliLiter(s) IV Continuous <Continuous>      Objective:  Vital Signs Last 24 Hrs  T(C): 36.6 (14 Aug 2020 18:00), Max: 37.7 (13 Aug 2020 23:00)  T(F): 97.8 (14 Aug 2020 18:00), Max: 99.9 (14 Aug 2020 00:00)  HR: 111 (14 Aug 2020 18:00) (106 - 189)  BP: 137/91 (13 Aug 2020 23:00) (137/91 - 194/124)  BP(mean): 109 (13 Aug 2020 23:00) (109 - 147)  RR: 12 (14 Aug 2020 18:00) (11 - 35)  SpO2: 100% (14 Aug 2020 18:00) (89% - 100%)    PHYSICAL EXAM:  Constitutional:no acute distress, this afternoon  Eyes:WALT, EOMI  Ear/Nose/Throat: no oral lesions, 	  Respiratory: clear BL  Cardiovascular: S1S2 sternotomy healed  Gastrointestinal:soft, (+) BS, no tenderness  diaz placed- dark yellow urine  Extremities:no e/e/c  No Lymphadenopathy  IV sites not inflammed.    LABS:                        7.4    4.76  )-----------( 153      ( 14 Aug 2020 14:14 )             23.7     08-13    138  |  101  |  20  ----------------------------<  237<H>  3.8   |  23  |  1.37<H>    Ca    8.8      13 Aug 2020 23:51  Phos  3.5     08-13  Mg     1.9     08-13    TPro  6.0  /  Alb  3.8  /  TBili  0.7  /  DBili  x   /  AST  40  /  ALT  38  /  AlkPhos  69  08-13    PT/INR - ( 13 Aug 2020 23:51 )   PT: 12.3 sec;   INR: 1.04 ratio         PTT - ( 13 Aug 2020 23:51 )  PTT:21.6 sec  Urinalysis Basic - ( 14 Aug 2020 09:48 )    Color: Light Yellow / Appearance: Clear / S.008 / pH: x  Gluc: x / Ketone: Negative  / Bili: Negative / Urobili: Negative   Blood: x / Protein: Trace / Nitrite: Negative   Leuk Esterase: Large / RBC: 4 /hpf / WBC 12 /HPF   Sq Epi: x / Non Sq Epi: 1 /hpf / Bacteria: Negative        MICROBIOLOGY:            RECENT CULTURES:   @ 14:48  .Blood Blood-Peripheral  --  --  --    Growth in anaerobic bottle: Gram Negative Rods  Growth in aerobic bottle: Gram Negative Rods  --   @ 12:14  .Blood Blood-Peripheral  Blood Culture PCR  Blood Culture PCR  PCR    Growth in anaerobic bottle: Gram Negative Rods  "Due to technical problems, Proteus sp. will Not be reported as part of  the BCID panel until further notice"  ***Blood Panel PCR results on this specimen are available  approximately 3 hours after the Gram stain result.***  Gram stain, PCR, and/or culture results may not always  correspond due to difference in methodologies.  ************************************************************  This PCR assay was performed using [employer].  The following targets are tested for: Enterococcus,  vancomycin resistant enterococci, Listeria monocytogenes,  coagulase negative staphylococci, S. aureus,  methicillin resistant S. aureus, Streptococcus agalactiae  (Group B), S. pneumoniae, S. pyogenes (Group A),  Acinetobacter baumannii, Enterobacter cloacae, E. coli,  Klebsiella oxytoca, K. pneumoniae, Proteus sp.,  Serratia marcescens, Haemophilus influenzae,  Neisseria meningitidis, Pseudomonas aeruginosa, Candida  albicans, C. glabrata, C krusei, C parapsilosis,  C. tropicalis and the KPC resistance gene.  --   @ 00:40  .Urine Clean Catch (Midstream)  --  --  --    >=3 organisms. Probable collection contamination.  --      RADIOLOGY & ADDITIONAL STUDIES:    < from: Xray Chest 1 View- PORTABLE-Urgent (20 @ 10:20) >    IMPRESSION:    The mediastinal cardiac silhouette is unremarkable. Sternotomy.    Elevated right hemidiaphragm. Right basilar linear atelectasis. Subtle opacity at left lung base improving since the previous exam.    No acute osseous finding.    < end of copied text >  < from: Xray Abdomen 1 View (20 @ 22:47) >  IMPRESSION:  Nonobstructive bowel gas pattern without evidence of free air.    < end of copied text >

## 2020-08-15 DIAGNOSIS — I10 ESSENTIAL (PRIMARY) HYPERTENSION: ICD-10-CM

## 2020-08-15 LAB
-  AMIKACIN: SIGNIFICANT CHANGE UP
-  AMPICILLIN/SULBACTAM: SIGNIFICANT CHANGE UP
-  AMPICILLIN: SIGNIFICANT CHANGE UP
-  AZTREONAM: SIGNIFICANT CHANGE UP
-  CEFAZOLIN: SIGNIFICANT CHANGE UP
-  CEFEPIME: SIGNIFICANT CHANGE UP
-  CEFOXITIN: SIGNIFICANT CHANGE UP
-  CEFTRIAXONE: SIGNIFICANT CHANGE UP
-  CIPROFLOXACIN: SIGNIFICANT CHANGE UP
-  ERTAPENEM: SIGNIFICANT CHANGE UP
-  GENTAMICIN: SIGNIFICANT CHANGE UP
-  IMIPENEM: SIGNIFICANT CHANGE UP
-  LEVOFLOXACIN: SIGNIFICANT CHANGE UP
-  MEROPENEM: SIGNIFICANT CHANGE UP
-  PIPERACILLIN/TAZOBACTAM: SIGNIFICANT CHANGE UP
-  TOBRAMYCIN: SIGNIFICANT CHANGE UP
-  TRIMETHOPRIM/SULFAMETHOXAZOLE: SIGNIFICANT CHANGE UP
ALBUMIN SERPL ELPH-MCNC: 3.3 G/DL — SIGNIFICANT CHANGE UP (ref 3.3–5)
ALP SERPL-CCNC: 56 U/L — SIGNIFICANT CHANGE UP (ref 40–120)
ALT FLD-CCNC: 31 U/L — SIGNIFICANT CHANGE UP (ref 10–45)
ANION GAP SERPL CALC-SCNC: 12 MMOL/L — SIGNIFICANT CHANGE UP (ref 5–17)
AST SERPL-CCNC: 38 U/L — SIGNIFICANT CHANGE UP (ref 10–40)
BILIRUB SERPL-MCNC: 0.7 MG/DL — SIGNIFICANT CHANGE UP (ref 0.2–1.2)
BUN SERPL-MCNC: 20 MG/DL — SIGNIFICANT CHANGE UP (ref 7–23)
CALCIUM SERPL-MCNC: 8.6 MG/DL — SIGNIFICANT CHANGE UP (ref 8.4–10.5)
CHLORIDE SERPL-SCNC: 105 MMOL/L — SIGNIFICANT CHANGE UP (ref 96–108)
CO2 SERPL-SCNC: 23 MMOL/L — SIGNIFICANT CHANGE UP (ref 22–31)
CREAT SERPL-MCNC: 1.32 MG/DL — HIGH (ref 0.5–1.3)
CULTURE RESULTS: NO GROWTH — SIGNIFICANT CHANGE UP
CULTURE RESULTS: SIGNIFICANT CHANGE UP
CULTURE RESULTS: SIGNIFICANT CHANGE UP
EVEROLIMUS, WHOLE BLOOD RESULT: 13.5 NG/ML — HIGH (ref 3–?)
EVEROLIMUS, WHOLE BLOOD RESULT: 14.5 NG/ML — HIGH (ref 3–?)
FERRITIN SERPL-MCNC: 764 NG/ML — HIGH (ref 30–400)
GAS PNL BLDA: SIGNIFICANT CHANGE UP
GLUCOSE BLDC GLUCOMTR-MCNC: 110 MG/DL — HIGH (ref 70–99)
GLUCOSE BLDC GLUCOMTR-MCNC: 113 MG/DL — HIGH (ref 70–99)
GLUCOSE BLDC GLUCOMTR-MCNC: 188 MG/DL — HIGH (ref 70–99)
GLUCOSE BLDC GLUCOMTR-MCNC: 208 MG/DL — HIGH (ref 70–99)
GLUCOSE SERPL-MCNC: 125 MG/DL — HIGH (ref 70–99)
HCT VFR BLD CALC: 28 % — LOW (ref 39–50)
HGB BLD-MCNC: 8.8 G/DL — LOW (ref 13–17)
IRON SATN MFR SERPL: 16 % — SIGNIFICANT CHANGE UP (ref 16–55)
IRON SATN MFR SERPL: 36 UG/DL — LOW (ref 45–165)
MCHC RBC-ENTMCNC: 30.8 PG — SIGNIFICANT CHANGE UP (ref 27–34)
MCHC RBC-ENTMCNC: 31.4 GM/DL — LOW (ref 32–36)
MCV RBC AUTO: 97.9 FL — SIGNIFICANT CHANGE UP (ref 80–100)
METHOD TYPE: SIGNIFICANT CHANGE UP
NRBC # BLD: 0 /100 WBCS — SIGNIFICANT CHANGE UP (ref 0–0)
ORGANISM # SPEC MICROSCOPIC CNT: SIGNIFICANT CHANGE UP
PLATELET # BLD AUTO: 153 K/UL — SIGNIFICANT CHANGE UP (ref 150–400)
POTASSIUM SERPL-MCNC: 4 MMOL/L — SIGNIFICANT CHANGE UP (ref 3.5–5.3)
POTASSIUM SERPL-SCNC: 4 MMOL/L — SIGNIFICANT CHANGE UP (ref 3.5–5.3)
PROT SERPL-MCNC: 5.5 G/DL — LOW (ref 6–8.3)
RBC # BLD: 2.86 M/UL — LOW (ref 4.2–5.8)
RBC # FLD: 21.4 % — HIGH (ref 10.3–14.5)
SODIUM SERPL-SCNC: 140 MMOL/L — SIGNIFICANT CHANGE UP (ref 135–145)
SPECIMEN SOURCE: SIGNIFICANT CHANGE UP
TIBC SERPL-MCNC: 218 UG/DL — LOW (ref 220–430)
UIBC SERPL-MCNC: 182 UG/DL — SIGNIFICANT CHANGE UP (ref 110–370)
WBC # BLD: 3.71 K/UL — LOW (ref 3.8–10.5)
WBC # FLD AUTO: 3.71 K/UL — LOW (ref 3.8–10.5)

## 2020-08-15 PROCEDURE — 93010 ELECTROCARDIOGRAM REPORT: CPT

## 2020-08-15 PROCEDURE — 71045 X-RAY EXAM CHEST 1 VIEW: CPT | Mod: 26

## 2020-08-15 PROCEDURE — 99291 CRITICAL CARE FIRST HOUR: CPT

## 2020-08-15 PROCEDURE — 99232 SBSQ HOSP IP/OBS MODERATE 35: CPT | Mod: GC

## 2020-08-15 PROCEDURE — 99232 SBSQ HOSP IP/OBS MODERATE 35: CPT

## 2020-08-15 PROCEDURE — 99233 SBSQ HOSP IP/OBS HIGH 50: CPT

## 2020-08-15 RX ORDER — HYDRALAZINE HCL 50 MG
25 TABLET ORAL EVERY 8 HOURS
Refills: 0 | Status: DISCONTINUED | OUTPATIENT
Start: 2020-08-15 | End: 2020-08-16

## 2020-08-15 RX ORDER — POTASSIUM CHLORIDE 20 MEQ
40 PACKET (EA) ORAL ONCE
Refills: 0 | Status: COMPLETED | OUTPATIENT
Start: 2020-08-15 | End: 2020-08-15

## 2020-08-15 RX ORDER — NICARDIPINE HYDROCHLORIDE 30 MG/1
5 CAPSULE, EXTENDED RELEASE ORAL
Qty: 40 | Refills: 0 | Status: DISCONTINUED | OUTPATIENT
Start: 2020-08-15 | End: 2020-08-17

## 2020-08-15 RX ADMIN — Medication 4: at 12:59

## 2020-08-15 RX ADMIN — Medication 650 MILLIGRAM(S): at 17:21

## 2020-08-15 RX ADMIN — PANTOPRAZOLE SODIUM 40 MILLIGRAM(S): 20 TABLET, DELAYED RELEASE ORAL at 06:26

## 2020-08-15 RX ADMIN — SODIUM CHLORIDE 3 MILLILITER(S): 9 INJECTION INTRAMUSCULAR; INTRAVENOUS; SUBCUTANEOUS at 06:59

## 2020-08-15 RX ADMIN — INSULIN GLARGINE 14 UNIT(S): 100 INJECTION, SOLUTION SUBCUTANEOUS at 22:12

## 2020-08-15 RX ADMIN — MAGNESIUM OXIDE 400 MG ORAL TABLET 400 MILLIGRAM(S): 241.3 TABLET ORAL at 13:08

## 2020-08-15 RX ADMIN — SODIUM CHLORIDE 3 MILLILITER(S): 9 INJECTION INTRAMUSCULAR; INTRAVENOUS; SUBCUTANEOUS at 12:53

## 2020-08-15 RX ADMIN — Medication 25 MILLIGRAM(S): at 17:20

## 2020-08-15 RX ADMIN — EVEROLIMUS 0.75 MILLIGRAM(S): 10 TABLET ORAL at 23:17

## 2020-08-15 RX ADMIN — Medication 5 UNIT(S): at 08:14

## 2020-08-15 RX ADMIN — Medication 1 TABLET(S): at 13:08

## 2020-08-15 RX ADMIN — Medication 25 MILLIGRAM(S): at 11:06

## 2020-08-15 RX ADMIN — Medication 2: at 17:19

## 2020-08-15 RX ADMIN — MEROPENEM 100 MILLIGRAM(S): 1 INJECTION INTRAVENOUS at 22:12

## 2020-08-15 RX ADMIN — POSACONAZOLE 300 MILLIGRAM(S): 100 TABLET, DELAYED RELEASE ORAL at 13:08

## 2020-08-15 RX ADMIN — NICARDIPINE HYDROCHLORIDE 25 MG/HR: 30 CAPSULE, EXTENDED RELEASE ORAL at 02:21

## 2020-08-15 RX ADMIN — Medication 81 MILLIGRAM(S): at 13:09

## 2020-08-15 RX ADMIN — Medication 650 MILLIGRAM(S): at 06:26

## 2020-08-15 RX ADMIN — Medication 2 GRAM(S): at 06:27

## 2020-08-15 RX ADMIN — Medication 2 GRAM(S): at 17:20

## 2020-08-15 RX ADMIN — MEROPENEM 100 MILLIGRAM(S): 1 INJECTION INTRAVENOUS at 06:26

## 2020-08-15 RX ADMIN — Medication 5 UNIT(S): at 12:59

## 2020-08-15 RX ADMIN — Medication 20 MILLIGRAM(S): at 22:13

## 2020-08-15 RX ADMIN — NICARDIPINE HYDROCHLORIDE 25 MG/HR: 30 CAPSULE, EXTENDED RELEASE ORAL at 16:49

## 2020-08-15 RX ADMIN — VALGANCICLOVIR 450 MILLIGRAM(S): 450 TABLET, FILM COATED ORAL at 13:08

## 2020-08-15 RX ADMIN — Medication 1 MILLIGRAM(S): at 13:07

## 2020-08-15 RX ADMIN — Medication 40 MILLIEQUIVALENT(S): at 01:14

## 2020-08-15 RX ADMIN — ENOXAPARIN SODIUM 40 MILLIGRAM(S): 100 INJECTION SUBCUTANEOUS at 13:07

## 2020-08-15 RX ADMIN — SODIUM CHLORIDE 3 MILLILITER(S): 9 INJECTION INTRAMUSCULAR; INTRAVENOUS; SUBCUTANEOUS at 22:07

## 2020-08-15 RX ADMIN — Medication 5 UNIT(S): at 17:19

## 2020-08-15 RX ADMIN — Medication 1000 UNIT(S): at 13:08

## 2020-08-15 RX ADMIN — PANTOPRAZOLE SODIUM 40 MILLIGRAM(S): 20 TABLET, DELAYED RELEASE ORAL at 17:20

## 2020-08-15 RX ADMIN — EVEROLIMUS 0.75 MILLIGRAM(S): 10 TABLET ORAL at 08:06

## 2020-08-15 RX ADMIN — Medication 40 MILLIGRAM(S): at 06:27

## 2020-08-15 RX ADMIN — MEROPENEM 100 MILLIGRAM(S): 1 INJECTION INTRAVENOUS at 13:09

## 2020-08-15 NOTE — PROGRESS NOTE ADULT - ASSESSMENT
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 He has responded appropriately to a single blood transfusion. Of note, he recently has developed dark colored stools. Will investigate if anemia is due to GI bleeding in setting of steroid use or hemolysis and manage appropriately. Underwent UGI without bleeding source identified  Developed gram negative sepsis overnight- transferred to CTU      Sepsis:  Klebsiella oxytoca growing in blood cultures 2/2 sets  Unclear if source was urinary tract vs GI tract vs related to his UGI procedure vs pneumnonia  Repeat blood cultures x2 sets sent and pending  Continue the Meropenem  follow up organism sensitivities and adjust antibiotics accordingly  CT results noted- ? patchy pneumonia    Anemia- r/o GI bleeding    UGI non revealing  Colonoscopy on hold given sepsis        Pancytopenia- on admission  check CMV viral load  repeat fungitell and galactomannan pending this admission  would repeat chest CT scan to see status of prior RUL scarring vs. nodule    Fungitell-  moderately elevated in May and non detectable on more recent June admission  repeat ordered  cotninue Posaconazole  repeat chest CT non contrast    OI prophylaxis-  has been receiving high dose steroids since 5/20  restarted on valganciclovir  restarted on bactrim  CMV viral load to check as above    Gary Lujan MD  [phone]  After 5pm/weekends [phone]

## 2020-08-15 NOTE — PROGRESS NOTE ADULT - SUBJECTIVE AND OBJECTIVE BOX
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 Patient was treated w/ prednisone and Rituximab. Tacro was discontinued and patient was also transitioned to everolimus  Admitted  -  for hyperglycemia.  Reported to transplant team having one done black tarry stool-  instructed to  present to [facility] for hemolytic anemia. Patient has been following with Hematology outpatient.  Denies CP  N/V diarrhea SOB. (11 Aug 2020 20:08)      Surgery/Hospital course:   Admitted to [facility] with symptomatic anemia   EGD for investigation of tarry stools    Today: Awaiting results of capsule study completed yesterday. CT without abdominal findings, ? pneumonia--ID following cont Isabel. Urine culture and blood cultures from  are NGTD. H&H stable this AM, currently OOB to chair. Hydralazine started in order to dc the Cardene.       REVIEW OF SYSTEMS:  Gen: No fever  EYES/ENT: No visual changes;  No vertigo or throat pain   NECK: No pain   RES:  No shortness of breath or Cough  Chest: + incisional pain  CARD: No chest pain   GI: No abdominal pain  : No dysuria  NEURO: No weakness  SKIN: No itching, rashes     Physical Exam:  Vital Signs Last 24 Hrs  T(C): 36.1 (15 Aug 2020 20:00), Max: 36.6 (15 Aug 2020 00:00)  T(F): 97 (15 Aug 2020 20:00), Max: 97.9 (15 Aug 2020 00:00)  HR: 122 (15 Aug 2020 22:00) (112 - 138)  BP: --  BP(mean): --  RR: 14 (15 Aug 2020 22:00) (10 - 36)  SpO2: 100% (15 Aug 2020 22:00) (95% - 100%)  Gen:  Awake, alert   CNS: non focal 	  Neck: no JVD  RES : clear , no wheezing              CVS: Sinus tachycardia. Normal S1/S2  Abd: Soft, mildly distended. Bowel sounds present.   Skin: No rash.  Ext:  no edema    ============================I/O===========================   I&O's Detail    14 Aug 2020 07:01  -  15 Aug 2020 07:00  --------------------------------------------------------  IN:    amiodarone Infusion: 99.9 mL    insulin regular Infusion: 14 mL    IV PiggyBack: 200 mL    niCARdipine Infusion: 165 mL    Oral Fluid: 1280 mL    Packed Red Blood Cells: 350 mL    Sodium Chloride 0.9% IV Bolus: 500 mL    sodium chloride 0.9%.: 230 mL  Total IN: 2838.9 mL    OUT:    Indwelling Catheter - Urethral: 2830 mL  Total OUT: 2830 mL    Total NET: 8.9 mL      15 Aug 2020 07:  -  15 Aug 2020 23:49  --------------------------------------------------------  IN:    IV PiggyBack: 50 mL    niCARdipine Infusion: 375 mL    Oral Fluid: 840 mL    sodium chloride 0.9%.: 150 mL  Total IN: 1415 mL    OUT:    Indwelling Catheter - Urethral: 290 mL    Voided: 200 mL  Total OUT: 490 mL    Total NET: 925 mL    ============================ LABS =========================                        8.8    3.71  )-----------( 153      ( 15 Aug 2020 01:25 )             28.0     08-15    140  |  105  |  20  ----------------------------<  125<H>  4.0   |  23  |  1.32<H>    Ca    8.6      15 Aug 2020 01:28  Phos  3.5     08-13  Mg     1.9     08-13    TPro  5.5<L>  /  Alb  3.3  /  TBili  0.7  /  DBili  x   /  AST  38  /  ALT  31  /  AlkPhos  56  08-15    LIVER FUNCTIONS - ( 15 Aug 2020 01:28 )  Alb: 3.3 g/dL / Pro: 5.5 g/dL / ALK PHOS: 56 U/L / ALT: 31 U/L / AST: 38 U/L / GGT: x           PT/INR - ( 13 Aug 2020 23:51 )   PT: 12.3 sec;   INR: 1.04 ratio      PTT - ( 13 Aug 2020 23:51 )  PTT:21.6 sec  ABG - ( 15 Aug 2020 21:59 )  pH, Arterial: 7.42  pH, Blood: x     /  pCO2: 42    /  pO2: 113   / HCO3: 26    / Base Excess: 2.1   /  SaO2: 99        Urinalysis Basic - ( 14 Aug 2020 09:48 )    Color: Light Yellow / Appearance: Clear / S.008 / pH: x  Gluc: x / Ketone: Negative  / Bili: Negative / Urobili: Negative   Blood: x / Protein: Trace / Nitrite: Negative   Leuk Esterase: Large / RBC: 4 /hpf / WBC 12 /HPF   Sq Epi: x / Non Sq Epi: 1 /hpf / Bacteria: Negative    ======================Micro/Rad/Cardio=================  Culture: Reviewed   CXR: Reviewed  Echo: Reviewed  ======================================================  PAST MEDICAL & SURGICAL HISTORY:  H/O hemolytic anemia  H/O autoimmune hemolytic anemia  Knee pain, right  HLD (hyperlipidemia)  Former smoker  DVT of upper extremity (deep vein thrombosis)  Hepatitis C virus  GIB (gastrointestinal bleeding)  Ventricular fibrillation: s/p AICD  PAF (paroxysmal atrial fibrillation): on xarelto  Non-Ischemic Cardiomyopathy  SVT (Supraventricular Tachycardia)  HTN  CHF (Congestive Heart Failure)  S/P right heart catheterization: biopsy multiple  H/O heart transplant: 2018  Status post left hip replacement  History of Prior Ablation Treatment: for afib  AICD (Automatic Cardioverter/Defibrillator) Present: [employer] with 1 [employer] lead09- explanted and replaced with Medtr[employer] 2 leads on 09    ====================ASSESSMENT ==============  Heart transplant 2018 for ACC/AHA stage D NICM   Supraventricular tachycardia  severe hypertension  Klebsiella oxytosis   Sepsis  Resolving acute respiratory failure   Hyperlactatemia acidosis  Leukopenia    Plan:  ====================== NEUROLOGY=====================  Nonfocal, continue to monitor neurorological status.    ==================== RESPIRATORY======================  No longer requiring noninvasive ventilator for resolving respiratory failure secondary to sepsis, requires investigation for sepsis     ====================CARDIOVASCULAR==================  Currently sinus tachycardic S/p IV Amiodarone for Supraventricular tachycardia   ASA/Statin for history of HLD and TCAD prophylaxis  AICD present  Tapering prednisone as per Heme    aspirin enteric coated 81 milliGRAM(s) Oral daily  hydrALAZINE 25 milliGRAM(s) Oral every 8 hours  niCARdipine Infusion 5 mG/Hr (25 mL/Hr) IV Continuous <Continuous>  pravastatin 20 milliGRAM(s) Oral at bedtime    ===================HEMATOLOGIC/ONC ===================  Anemia s/p single blood transfusion.   Continue monitoring hematocrit hemoglobin and platelets.  Lovenox for DVT prophylaxis.    enoxaparin Injectable 40 milliGRAM(s) SubCutaneous daily  predniSONE   Tablet 40 milliGRAM(s) Oral daily    ===================== RENAL =========================  Continue monitoring urine output, I/Os, BUN/Creatinine.    ==================== GASTROINTESTINAL===================  S/p EGD for investigation of tarry stools   Protonix for stress ulcer prophylaxis.   Tolerating consistent carbohydrate/no snacks diet    cholecalciferol 1000 Unit(s) Oral daily  folic acid 1 milliGRAM(s) Oral daily  magnesium oxide 400 milliGRAM(s) Oral daily  pantoprazole  Injectable 40 milliGRAM(s) IV Push every 12 hours    =======================    ENDOCRINE  =====================  Hyperglycemia requiring Humalog sliding scale, insulin gtt, and glucagon prn. Monitor glucose levels to maintain euglycemia.      glucagon  Injectable 1 milliGRAM(s) IntraMuscular once PRN Glucose LESS THAN 70 milligrams/deciliter  insulin glargine Injectable (LANTUS) 14 Unit(s) SubCutaneous at bedtime  insulin lispro (HumaLOG) corrective regimen sliding scale   SubCutaneous three times a day before meals  insulin lispro Injectable (HumaLOG) 5 Unit(s) SubCutaneous three times a day with meals    ========================INFECTIOUS DISEASE================  +UTI, ID following.   Bacteremia and sepsis requiring IV antibiotics.    meropenem  IVPB 1000 milliGRAM(s) IV Intermittent every 8 hours  posaconazole DR Tablet 300 milliGRAM(s) Oral daily  trimethoprim   80 mG/sulfamethoxazole 400 mG 1 Tablet(s) Oral daily  valGANciclovir 450 milliGRAM(s) Oral daily          I have spent 30 minutes providing acute care for this critically ill patient.     By signing my name below, I, Angie Mg, attest that this documentation has been prepared under the direction and in the presence of Dr. Maxwell.  Electronically signed: Ab Garcia, 08-15-20 @ 23:49    I Dr. Maxwell, personally performed the services described in this documentation. all medical record entries made by the ab were at my direction and in my presence. 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 Patient was treated w/ prednisone and Rituximab. Tacro was discontinued and patient was also transitioned to everolimus  Admitted  -  for hyperglycemia.  Reported to transplant team having one done black tarry stool-  instructed to  present to [facility] for hemolytic anemia. Patient has been following with Hematology outpatient.  Denies CP  N/V diarrhea SOB. (11 Aug 2020 20:08)      Surgery/Hospital course:   Admitted to [facility] with symptomatic anemia   EGD for investigation of tarry stools    Today: Awaiting results of capsule study completed yesterday. CT without abdominal findings, ? pneumonia--ID following cont Isabel. Urine culture and blood cultures from  are NGTD. H&H stable this AM, currently OOB to chair. Hydralazine started in order to dc the Cardene.       REVIEW OF SYSTEMS:  Gen: No fever  EYES/ENT: No visual changes;  No vertigo or throat pain   NECK: No pain   RES:  No shortness of breath or Cough  CARD: No chest pain   GI: No abdominal pain  : No dysuria  NEURO: No weakness  SKIN: No itching, rashes     Physical Exam:  Vital Signs Last 24 Hrs  T(C): 36.1 (15 Aug 2020 20:00), Max: 36.6 (15 Aug 2020 00:00)  T(F): 97 (15 Aug 2020 20:00), Max: 97.9 (15 Aug 2020 00:00)  HR: 122 (15 Aug 2020 22:00) (112 - 138)  BP: --  BP(mean): --  RR: 14 (15 Aug 2020 22:00) (10 - 36)  SpO2: 100% (15 Aug 2020 22:00) (95% - 100%)  Gen:  Awake, alert   CNS: non focal 	  Neck: no JVD  RES : clear , no wheezing              CVS: Sinus tachycardia. Normal S1/S2  Abd: Soft, mildly distended. Bowel sounds present.   Skin: No rash.  Ext:  no edema    ============================I/O===========================   I&O's Detail    14 Aug 2020 07:01  -  15 Aug 2020 07:00  --------------------------------------------------------  IN:    amiodarone Infusion: 99.9 mL    insulin regular Infusion: 14 mL    IV PiggyBack: 200 mL    niCARdipine Infusion: 165 mL    Oral Fluid: 1280 mL    Packed Red Blood Cells: 350 mL    Sodium Chloride 0.9% IV Bolus: 500 mL    sodium chloride 0.9%.: 230 mL  Total IN: 2838.9 mL    OUT:    Indwelling Catheter - Urethral: 2830 mL  Total OUT: 2830 mL    Total NET: 8.9 mL      15 Aug 2020 07:  -  15 Aug 2020 23:49  --------------------------------------------------------  IN:    IV PiggyBack: 50 mL    niCARdipine Infusion: 375 mL    Oral Fluid: 840 mL    sodium chloride 0.9%.: 150 mL  Total IN: 1415 mL    OUT:    Indwelling Catheter - Urethral: 290 mL    Voided: 200 mL  Total OUT: 490 mL    Total NET: 925 mL    ============================ LABS =========================                        8.8    3.71  )-----------( 153      ( 15 Aug 2020 01:25 )             28.0     08-15    140  |  105  |  20  ----------------------------<  125<H>  4.0   |  23  |  1.32<H>    Ca    8.6      15 Aug 2020 01:28  Phos  3.5     08-13  Mg     1.9     08-13    TPro  5.5<L>  /  Alb  3.3  /  TBili  0.7  /  DBili  x   /  AST  38  /  ALT  31  /  AlkPhos  56  08-15    LIVER FUNCTIONS - ( 15 Aug 2020 01:28 )  Alb: 3.3 g/dL / Pro: 5.5 g/dL / ALK PHOS: 56 U/L / ALT: 31 U/L / AST: 38 U/L / GGT: x           PT/INR - ( 13 Aug 2020 23:51 )   PT: 12.3 sec;   INR: 1.04 ratio      PTT - ( 13 Aug 2020 23:51 )  PTT:21.6 sec  ABG - ( 15 Aug 2020 21:59 )  pH, Arterial: 7.42  pH, Blood: x     /  pCO2: 42    /  pO2: 113   / HCO3: 26    / Base Excess: 2.1   /  SaO2: 99        Urinalysis Basic - ( 14 Aug 2020 09:48 )    Color: Light Yellow / Appearance: Clear / S.008 / pH: x  Gluc: x / Ketone: Negative  / Bili: Negative / Urobili: Negative   Blood: x / Protein: Trace / Nitrite: Negative   Leuk Esterase: Large / RBC: 4 /hpf / WBC 12 /HPF   Sq Epi: x / Non Sq Epi: 1 /hpf / Bacteria: Negative    ======================Micro/Rad/Cardio=================  Culture: Reviewed   CXR: Reviewed  Echo: Reviewed  ======================================================  PAST MEDICAL & SURGICAL HISTORY:  H/O hemolytic anemia  H/O autoimmune hemolytic anemia  Knee pain, right  HLD (hyperlipidemia)  Former smoker  DVT of upper extremity (deep vein thrombosis)  Hepatitis C virus  GIB (gastrointestinal bleeding)  Ventricular fibrillation: s/p AICD  PAF (paroxysmal atrial fibrillation): on xarelto  Non-Ischemic Cardiomyopathy  SVT (Supraventricular Tachycardia)  HTN  CHF (Congestive Heart Failure)  S/P right heart catheterization: biopsy multiple  H/O heart transplant: 2018  Status post left hip replacement  History of Prior Ablation Treatment: for afib  AICD (Automatic Cardioverter/Defibrillator) Present: [employer] with 1 [employer] lead09- explanted and replaced with Medtr[employer] 2 leads on 09    ====================ASSESSMENT ==============  Heart transplant 2018 for ACC/AHA stage D NICM   Supraventricular tachycardia  severe hypertension  Klebsiella oxytosis Sepsis  Resolving acute respiratory failure   Leukopenia    Plan:  ====================== NEUROLOGY=====================  Nonfocal, continue to monitor neurorological status.    ==================== RESPIRATORY======================  No longer requiring noninvasive ventilator for resolving respiratory failure secondary to sepsis, requires investigation for sepsis     ====================CARDIOVASCULAR==================  Currently sinus tachycardic S/p IV Amiodarone for Supraventricular tachycardia   ASA/Statin for history of HLD and TCAD prophylaxis  AICD present  Tapering prednisone as per Heme    aspirin enteric coated 81 milliGRAM(s) Oral daily  hydrALAZINE 25 milliGRAM(s) Oral every 8 hours  niCARdipine Infusion 5 mG/Hr (25 mL/Hr) IV Continuous <Continuous>  pravastatin 20 milliGRAM(s) Oral at bedtime    ===================HEMATOLOGIC/ONC ===================  Anemia s/p single blood transfusion.   Continue monitoring hematocrit hemoglobin and platelets.  Lovenox for DVT prophylaxis.    enoxaparin Injectable 40 milliGRAM(s) SubCutaneous daily  predniSONE   Tablet 40 milliGRAM(s) Oral daily    ===================== RENAL =========================  Continue monitoring urine output, I/Os, BUN/Creatinine.    ==================== GASTROINTESTINAL===================  S/p EGD for investigation of tarry stools   Protonix for stress ulcer prophylaxis.   Tolerating consistent carbohydrate/no snacks diet    cholecalciferol 1000 Unit(s) Oral daily  folic acid 1 milliGRAM(s) Oral daily  magnesium oxide 400 milliGRAM(s) Oral daily  pantoprazole  Injectable 40 milliGRAM(s) IV Push every 12 hours    =======================    ENDOCRINE  =====================  Hyperglycemia requiring Humalog sliding scale, insulin gtt, and glucagon prn. Monitor glucose levels to maintain euglycemia.      glucagon  Injectable 1 milliGRAM(s) IntraMuscular once PRN Glucose LESS THAN 70 milligrams/deciliter  insulin glargine Injectable (LANTUS) 14 Unit(s) SubCutaneous at bedtime  insulin lispro (HumaLOG) corrective regimen sliding scale   SubCutaneous three times a day before meals  insulin lispro Injectable (HumaLOG) 5 Unit(s) SubCutaneous three times a day with meals    ========================INFECTIOUS DISEASE================  +UTI, ID following.   Bacteremia and sepsis requiring IV antibiotics.    meropenem  IVPB 1000 milliGRAM(s) IV Intermittent every 8 hours  posaconazole DR Tablet 300 milliGRAM(s) Oral daily  trimethoprim   80 mG/sulfamethoxazole 400 mG 1 Tablet(s) Oral daily  valGANciclovir 450 milliGRAM(s) Oral daily          I have spent 30 minutes providing acute care for this critically ill patient.     By signing my name below, I, Angie Mg, attest that this documentation has been prepared under the direction and in the presence of Dr. Maxwell.  Electronically signed: Katty Garcia, 08-15-20 @ 23:49    I Dr. Maxwell, personally performed the services described in this documentation. all medical record entries made by the scribe were at my direction and in my presence. I have reviewed the chart and agree that the record reflects my personal performance and is accurate and complete  Electronically signed: Jakob Maxwell, 08-15-20 @ 23:49

## 2020-08-15 NOTE — PROGRESS NOTE ADULT - SUBJECTIVE AND OBJECTIVE BOX
Patient is a 70y old  Male who presents with a chief complaint of SOB/anemia (15 Aug 2020 11:35)    Being followed by ID for        Interval history:  pt resting quietly in chair  feeling " a little better"  No other acute events        PAST MEDICAL & SURGICAL HISTORY:  H/O hemolytic anemia  H/O autoimmune hemolytic anemia  Knee pain, right  HLD (hyperlipidemia)  Former smoker  DVT of upper extremity (deep vein thrombosis)  Hepatitis C virus  GIB (gastrointestinal bleeding)  Ventricular fibrillation: s/p AICD  PAF (paroxysmal atrial fibrillation): on xarelto  Non-Ischemic Cardiomyopathy  SVT (Supraventricular Tachycardia)  HTN  CHF (Congestive Heart Failure)  S/P right heart catheterization: biopsy multiple  H/O heart transplant: 2018  Status post left hip replacement  History of Prior Ablation Treatment: for afib  AICD (Automatic Cardioverter/Defibrillator) Present: [employer] with 1 [employer] lead09- explanted and replaced with Esphiontronic 2 leads on 09    Allergies    No Known Allergies    Intolerances      Antimicrobials:    meropenem  IVPB 1000 milliGRAM(s) IV Intermittent every 8 hours  posaconazole DR Tablet 300 milliGRAM(s) Oral daily  trimethoprim   80 mG/sulfamethoxazole 400 mG 1 Tablet(s) Oral daily  valGANciclovir 450 milliGRAM(s) Oral daily    MEDICATIONS  (STANDING):  aspirin enteric coated 81 milliGRAM(s) Oral daily  cholecalciferol 1000 Unit(s) Oral daily  dextrose 5%. 1000 milliLiter(s) (50 mL/Hr) IV Continuous <Continuous>  dextrose 50% Injectable 12.5 Gram(s) IV Push once  enoxaparin Injectable 40 milliGRAM(s) SubCutaneous daily  everolimus (ZORTRESS) 0.75 milliGRAM(s) Oral <User Schedule>  folic acid 1 milliGRAM(s) Oral daily  hydrALAZINE 25 milliGRAM(s) Oral every 8 hours  insulin glargine Injectable (LANTUS) 14 Unit(s) SubCutaneous at bedtime  insulin lispro (HumaLOG) corrective regimen sliding scale   SubCutaneous three times a day before meals  insulin lispro Injectable (HumaLOG) 5 Unit(s) SubCutaneous three times a day with meals  magnesium oxide 400 milliGRAM(s) Oral daily  meropenem  IVPB 1000 milliGRAM(s) IV Intermittent every 8 hours  niCARdipine Infusion 5 mG/Hr (25 mL/Hr) IV Continuous <Continuous>  omega-3-Acid Ethyl Esters 2 Gram(s) Oral two times a day  pantoprazole  Injectable 40 milliGRAM(s) IV Push every 12 hours  posaconazole DR Tablet 300 milliGRAM(s) Oral daily  pravastatin 20 milliGRAM(s) Oral at bedtime  predniSONE   Tablet 40 milliGRAM(s) Oral daily  sodium bicarbonate 650 milliGRAM(s) Oral two times a day  sodium chloride 0.9% lock flush 3 milliLiter(s) IV Push every 8 hours  sodium chloride 0.9%. 1000 milliLiter(s) (10 mL/Hr) IV Continuous <Continuous>  trimethoprim   80 mG/sulfamethoxazole 400 mG 1 Tablet(s) Oral daily  valGANciclovir 450 milliGRAM(s) Oral daily      Vital Signs Last 24 Hrs  T(C): 35.6 (08-15-20 @ 04:00), Max: 36.6 (20 @ 18:00)  T(F): 96 (08-15-20 @ 04:00), Max: 97.9 (20 @ 20:00)  HR: 122 (08-15-20 @ 11:00) (106 - 129)  BP: --  BP(mean): --  RR: 18 (08-15-20 @ 11:00) (10 - 36)  SpO2: 97% (08-15-20 @ 11:00) (95% - 100%)    Physical Exam:    Constitutional well preserved,comfortable,pleasant    HEENT PERRLA EOMI,No pallor or icterus    No oral exudate or erythema    Neck supple no JVD or LN    Chest Good AE,CTA    CVS RRR S1 S2 WNl No murmur or rub or gallop    Abd soft BS normal No tenderness no masses    Ext No cyanosis clubbing or edema    IV site no erythema tenderness or discharge    Joints no swelling or LOM    CNS AAO X 3 no focal    Lab Data:                          8.8    3.71  )-----------( 153      ( 15 Aug 2020 01:25 )             28.0       08-15    140  |  105  |  20  ----------------------------<  125<H>  4.0   |  23  |  1.32<H>    Ca    8.6      15 Aug 2020 01:28  Phos  3.5     08-13  Mg     1.9         TPro  5.5<L>  /  Alb  3.3  /  TBili  0.7  /  DBili  x   /  AST  38  /  ALT  31  /  AlkPhos  56  08-15      Urinalysis Basic - ( 14 Aug 2020 09:48 )    Color: Light Yellow / Appearance: Clear / S.008 / pH: x  Gluc: x / Ketone: Negative  / Bili: Negative / Urobili: Negative   Blood: x / Protein: Trace / Nitrite: Negative   Leuk Esterase: Large / RBC: 4 /hpf / WBC 12 /HPF   Sq Epi: x / Non Sq Epi: 1 /hpf / Bacteria: Negative        .Urine Clean Catch (Midstream)  20   No growth  --  --      .Blood Blood  20   No growth to date.  --  --      .Blood Blood-Peripheral  20   Growth in aerobic and anaerobic bottles: Klebsiella oxytoca/Raoutella  ornithinolytica  See previous culture [id number]  --    Growth in anaerobic bottle: Gram Negative Rods  Growth in aerobic bottle: Gram Negative Rods      .Blood Blood-Peripheral  20   Growth in anaerobic bottle: Klebsiella oxytoca/Raoutella ornithinolytica  "Due to technical problems, Proteus sp. will Not be reported as part of  the BCID panel until further notice"  ***Blood Panel PCR results on this specimen are available  approximately 3 hours after the Gram stain result.***  Gram stain, PCR, and/or culture results may not always  correspond due to difference in methodologies.  ************************************************************  This PCR assay was performed using[employer].  The following targets are tested for: Enterococcus,  vancomycin resistant enterococci, Listeria monocytogenes,  coagulase negative staphylococci, S. aureus,  methicillin resistant S. aureus, Streptococcus agalactiae  (Group B), S.pneumoniae, S. pyogenes (Group A),  Acinetobacter baumannii, Enterobacter cloacae, E. coli,  Klebsiella oxytoca, K. pneumoniae, Proteus sp.,  Serratia marcescens, Haemophilus influenzae,  Neisseria meningitidis, Pseudomonas aeruginosa, Candida  albicans, C. glabrata, C krusei, C parapsilosis,  C. tropicalis and the KPC resistance gene.  --  Blood Culture PCR      .Urine Clean Catch (Midstream)  20   >=3 organisms. Probable collection contamination.  --  --      < from: CT Abdomen and Pelvis w/ Oral Cont and w/ IV Cont (20 @ 22:33) >  EXAM:  CT ABDOMEN AND PELVIS OC IC                          EXAM:  CT CHEST IC                            PROCEDURE DATE:  2020            INTERPRETATION:  CLINICAL INFORMATION: Bacteremia    COMPARISON: CT chest 2020, CT abdomen pelvis 2020    PROCEDURE:  CT of the Chest, Abdomen and Pelvis was performed with intravenous contrast.  Intravenous contrast: 90 ml Omnipaque 350. 10 ml discarded.  Oral contrast: Positive contrast was administered.  Sagittal and coronal reformats wereperformed.    FINDINGS:  CHEST:  LUNGS AND LARGE AIRWAYS: Mild secretions within the trachea and right mainstem bronchus. Central airways are otherwise patent. Centrilobular emphysema. Subsegmental atelectasis in the right middle and lower lobes. Mild patchy opacities within the left upper lobe and left lower lobe concerning for pneumonia.  PLEURA: Trace bilateral pleural effusions.  VESSELS: Atherosclerotic changes of the aorta.  HEART: Heart size is normal. No pericardial effusion.  MEDIASTINUMAND DARIEN: No lymphadenopathy.  CHEST WALL AND LOWER NECK: Within normal limits.    ABDOMEN AND PELVIS:  LIVER: Within normal limits.  BILE DUCTS: Normal caliber.  GALLBLADDER: Cholelithiasis.  SPLEEN: Within normal limits.  PANCREAS: Within normal limits.  ADRENALS: Within normal limits.  KIDNEYS/URETERS: Left renal cyst.    BLADDER: Rosas catheter.  REPRODUCTIVE ORGANS: Prostate within normal limits.    BOWEL: No bowel obstruction. Metallic density in the cecum possibly a capsule camera.  PERITONEUM: No ascites.  VESSELS: Atherosclerotic changes.  RETROPERITONEUM/LYMPH NODES: No lymphadenopathy.  ABDOMINAL WALL: Within normal limits.  BONES: Degenerative changes.    IMPRESSION:  Patchy opacities in the left upper and left lower lobes concerning for pneumonia.    Metallic density in the cecum possibly a retained capsule camera.                    MAYRA RODRIGUEZ MD; Attending Radiologist  This document has been electronically signed. Aug 15 2020  8:05AM    < end of copied text >                WBC Count: 3.71 (08-15-20 @ 01:25)  WBC Count: 4.76 (20 @ 14:14)  WBC Count: 6.23 (20 @ 23:51)  WBC Count: 6.43 (20 @ 22:53)  WBC Count: 2.75 (20 @ 10:04)  WBC Count: 2.84 (20 @ 08:01)  WBC Count: 3.39 (20 @ 22:28)  WBC Count: 2.90 (20 @ 18:24)

## 2020-08-15 NOTE — PROGRESS NOTE ADULT - SUBJECTIVE AND OBJECTIVE BOX
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 Patient was treated w/ prednisone and Rituximab. Tacro was discontinued and patient was also transitioned to everolimus  Admitted  -  for hyperglycemia.  Reported to transplant team having one done black tarry stool-  instructed to  present to [facility] for hemolytic anemia. Patient has been following with Hematology outpatient.  Denies CP  N/V diarrhea SOB. (11 Aug 2020 20:08)      Surgery/Hospital Course:   Admitted to [facility] with symptomatic anemia   EGD for investigation of tarry stools    Today:  no acute events      REVIEW OF SYSTEMS:  Gen: No fever  EYES/ENT: No visual changes;  No vertigo or throat pain   NECK: No pain   RES:  No shortness of breath or Cough  CARD: No chest pain   GI: No abdominal pain  : No dysuria  NEURO: No weakness  SKIN: No itching, rashes     ICU Vital Signs Last 24 Hrs  T(C): 35.6 (15 Aug 2020 04:00), Max: 36.6 (14 Aug 2020 18:00)  T(F): 96 (15 Aug 2020 04:00), Max: 97.9 (14 Aug 2020 20:00)  HR: 122 (15 Aug 2020 11:00) (106 - 129)  BP: --  BP(mean): --  ABP: 134/63 (15 Aug 2020 11:00) (109/57 - 161/82)  ABP(mean): 85 (15 Aug 2020 11:00) (74 - 108)  RR: 18 (15 Aug 2020 11:00) (10 - 36)  SpO2: 97% (15 Aug 2020 11:00) (95% - 100%)      Physical Exam:  Gen:  Awake, alert   CNS: non focal 	  Neck: no JVD  RES : clear , no wheezing              CVS: Sinus tachycardia. Normal S1/S2  Abd: Soft, non-distended. Bowel sounds present.   Skin: No rash.  Ext:  no edema      ============================I/O===========================   I&O's Detail    14 Aug 2020 07:  -  15 Aug 2020 07:00  --------------------------------------------------------  IN:    amiodarone Infusion: 99.9 mL    insulin regular Infusion: 14 mL    IV PiggyBack: 200 mL    niCARdipine Infusion: 165 mL    Oral Fluid: 1280 mL    Packed Red Blood Cells: 350 mL    Sodium Chloride 0.9% IV Bolus: 500 mL    sodium chloride 0.9%.: 230 mL  Total IN: 2838.9 mL    OUT:    Indwelling Catheter - Urethral: 2830 mL  Total OUT: 2830 mL    Total NET: 8.9 mL      15 Aug 2020 07:  -  15 Aug 2020 11:36  --------------------------------------------------------  IN:    niCARdipine Infusion: 85 mL    Oral Fluid: 240 mL    sodium chloride 0.9%.: 40 mL  Total IN: 365 mL    OUT:    Indwelling Catheter - Urethral: 200 mL  Total OUT: 200 mL    Total NET: 165 mL        ============================ LABS =========================                        8.8    3.71  )-----------( 153      ( 15 Aug 2020 01:25 )             28.0     08-15    140  |  105  |  20  ----------------------------<  125<H>  4.0   |  23  |  1.32<H>    Ca    8.6      15 Aug 2020 01:28  Phos  3.5     08-13  Mg     1.9     08-13    TPro  5.5<L>  /  Alb  3.3  /  TBili  0.7  /  DBili  x   /  AST  38  /  ALT  31  /  AlkPhos  56  08-15    LIVER FUNCTIONS - ( 15 Aug 2020 01:28 )  Alb: 3.3 g/dL / Pro: 5.5 g/dL / ALK PHOS: 56 U/L / ALT: 31 U/L / AST: 38 U/L / GGT: x           PT/INR - ( 13 Aug 2020 23:51 )   PT: 12.3 sec;   INR: 1.04 ratio         PTT - ( 13 Aug 2020 23:51 )  PTT:21.6 sec  ABG - ( 15 Aug 2020 10:43 )  pH, Arterial: 7.43  pH, Blood: x     /  pCO2: 40    /  pO2: 78    / HCO3: 26    / Base Excess: 2.1   /  SaO2: 96                Urinalysis Basic - ( 14 Aug 2020 09:48 )    Color: Light Yellow / Appearance: Clear / S.008 / pH: x  Gluc: x / Ketone: Negative  / Bili: Negative / Urobili: Negative   Blood: x / Protein: Trace / Nitrite: Negative   Leuk Esterase: Large / RBC: 4 /hpf / WBC 12 /HPF   Sq Epi: x / Non Sq Epi: 1 /hpf / Bacteria: Negative      ======================Micro/Rad/Cardio=================  Culture: Reviewed   CXR: Reviewed  Echo:Reviewed  ======================================================  PAST MEDICAL & SURGICAL HISTORY:  H/O hemolytic anemia  H/O autoimmune hemolytic anemia  Knee pain, right  HLD (hyperlipidemia)  Former smoker  DVT of upper extremity (deep vein thrombosis)  Hepatitis C virus  GIB (gastrointestinal bleeding)  Ventricular fibrillation: s/p AICD  PAF (paroxysmal atrial fibrillation): on xarelto  Non-Ischemic Cardiomyopathy  SVT (Supraventricular Tachycardia)  HTN  CHF (Congestive Heart Failure)  S/P right heart catheterization: biopsy multiple  H/O heart transplant: 2018  Status post left hip replacement  History of Prior Ablation Treatment: for afib  AICD (Automatic Cardioverter/Defibrillator) Present: [employer] with 1 [employer] lead09- explanted and replaced with Medtronic 2 leads on 09    ====================ASSESSMENT ==============  Heart transplant 2018 for ACC/AHA stage D NICM   Supraventricular tachycardia  severe hypertension  Klebsiella oxytosis   Sepsis  Resolving acute respiratory failure   Hyperlactatemia acidosis  Leukopenia      Plan:  ====================== NEUROLOGY=====================  Nonfocal, continue to monitor neuro status    ==================== RESPIRATORY======================  Resolving respiratory failure secondary to sepsis, requires investigation to sepsis, continue oxygen therapy via 3L nasal cannula    ====================CARDIOVASCULAR==================  Currently sinus tachycardic S/p IV Amiodarone for Supraventricular tachycardia   ASA/Statin for history of HLD and TCAD prophylaxis  AICD present  Tapering prednisone as per Heme  Blood pressure support with PO Hydralazine and IV Cardene    pravastatin 20 milliGRAM(s) Oral at bedtime  predniSONE   Tablet 40 milliGRAM(s) Oral daily  hydrALAZINE 25 milliGRAM(s) Oral every 8 hours  niCARdipine Infusion 5 mG/Hr (25 mL/Hr) IV Continuous <Continuous>    ===================HEMATOLOGIC/ONC ===================  Anemia s/p single blood transfusion   Monitor H&H     aspirin enteric coated 81 milliGRAM(s) Oral daily  VTE prophylaxis, enoxaparin Injectable 40 milliGRAM(s) SubCutaneous daily    ===================== RENAL =========================  Continue monitoring urine output, I&Os, BUN/Cr    ==================== GASTROINTESTINAL===================  GI following S/p EGD for investigation of tarry stools with no identifiable source    cholecalciferol 1000 Unit(s) Oral daily  dextrose 5%. 1000 milliLiter(s) (50 mL/Hr) IV Continuous <Continuous>  folic acid 1 milliGRAM(s) Oral daily  magnesium oxide 400 milliGRAM(s) Oral daily  GI prophylaxis, pantoprazole  Injectable 40 milliGRAM(s) IV Push every 12 hours  sodium bicarbonate 650 milliGRAM(s) Oral two times a day  sodium chloride 0.9% lock flush 3 milliLiter(s) IV Push every 8 hours  sodium chloride 0.9%. 1000 milliLiter(s) (10 mL/Hr) IV Continuous <Continuous>    =======================    ENDOCRINE  =====================  Hyperglycemia requiring humalog sliding scale, LANTUS, and glucagon prn     dextrose 50% Injectable 12.5 Gram(s) IV Push once  glucagon  Injectable 1 milliGRAM(s) IntraMuscular once PRN Glucose LESS THAN 70 milligrams/deciliter  insulin glargine Injectable (LANTUS) 14 Unit(s) SubCutaneous at bedtime  insulin lispro (HumaLOG) corrective regimen sliding scale   SubCutaneous three times a day before meals  insulin lispro Injectable (HumaLOG) 5 Unit(s) SubCutaneous three times a day with meals      ========================INFECTIOUS DISEASE================  +UTI   Bacteremia and sepsis requiring IV antibiotics   WBC 3.7    meropenem  IVPB 1000 milliGRAM(s) IV Intermittent every 8 hours  posaconazole DR Tablet 300 milliGRAM(s) Oral daily  trimethoprim   80 mG/sulfamethoxazole 400 mG 1 Tablet(s) Oral daily  valGANciclovir 450 milliGRAM(s) Oral daily      Patient requires continuous monitoring with bedside rhythm monitoring, pulse ox monitoring, and intermittent blood gas analysis. Care plan discussed with ICU care team. Patient remained critical and required more than usual post op care.    By signing my name below, I, Breanna Newell, attest that this documentation has been prepared under the direction and in the presence of SHELLY Ennis   Electronically signed: Katty Ramírez, 08-15-20 @ 11:36    I, Elijah Landry  , personally performed the services described in this documentation. all medical record entries made by the ramuibe were at my direction and in my presence. I have reviewed the chart and agree that the record reflects my personal performance and is accurate and complete  Electronically signed: SHELLY Ennis BILLY RUSSELL  MRN-[id number]  Patient is a 70y old  Male who presents with a chief complaint of SOB/anemia (14 Aug 2020 18:57)    HPI:  This is a 70y Male w/ NICM s/p HM2 s/p OHT on 18 with coronary fistula, HCV+ s/p Rx, prior antibody mediated rejection s/p IVIG plasmapharesis/Rituximab, CKD (baseline Cr 1.4), admission for hemolytic anemia of unclear etiology from -. Patient was treated w/ prednisone and Rituximab. Tacro was discontinued and patient was also transitioned to everolimus  Admitted  -  for hyperglycemia.  Reported to transplant team having one done black tarry stool-  instructed to  present to [facility] for hemolytic anemia. Patient has been following with Hematology outpatient.  Denies CP  N/V diarrhea SOB. (11 Aug 2020 20:08)      Surgery/Hospital Course:   Admitted to [facility] with symptomatic anemia   EGD for investigation of tarry stools    Today: Awaiting results of capsule study completed yesterday. CT without abdominal findings, ? pneumonia--ID following cont Isabel. Urine culture and blood cultures from  are NGTD. H&H stable this AM, currently OOB to chair. Hydralazine started in order to dc the Cardene.     REVIEW OF SYSTEMS:  Gen: No fever  EYES/ENT: No visual changes;  No vertigo or throat pain   NECK: No pain   RES:  No shortness of breath or Cough  CARD: No chest pain   GI: No abdominal pain  : No dysuria  NEURO: No weakness  SKIN: No itching, rashes     ICU Vital Signs Last 24 Hrs  T(C): 35.6 (15 Aug 2020 04:00), Max: 36.6 (14 Aug 2020 18:00)  T(F): 96 (15 Aug 2020 04:00), Max: 97.9 (14 Aug 2020 20:00)  HR: 122 (15 Aug 2020 11:00) (106 - 129)  BP: --  BP(mean): --  ABP: 134/63 (15 Aug 2020 11:00) (109/57 - 161/82)  ABP(mean): 85 (15 Aug 2020 11:00) (74 - 108)  RR: 18 (15 Aug 2020 11:00) (10 - 36)  SpO2: 97% (15 Aug 2020 11:00) (95% - 100%)    Physical Exam:  Gen:  Awake, alert   CNS: non focal 	  Neck: no JVD  RES : clear , no wheezing              CVS: Sinus tachycardia. Normal S1/S2  Abd: Soft, mildly distended. Bowel sounds present.   Skin: No rash.  Ext:  no edema    ============================I/O===========================   I&O's Detail    14 Aug 2020 07:01  -  15 Aug 2020 07:00  --------------------------------------------------------  IN:    amiodarone Infusion: 99.9 mL    insulin regular Infusion: 14 mL    IV PiggyBack: 200 mL    niCARdipine Infusion: 165 mL    Oral Fluid: 1280 mL    Packed Red Blood Cells: 350 mL    Sodium Chloride 0.9% IV Bolus: 500 mL    sodium chloride 0.9%.: 230 mL  Total IN: 2838.9 mL    OUT:    Indwelling Catheter - Urethral: 2830 mL  Total OUT: 2830 mL    Total NET: 8.9 mL    15 Aug 2020 07:01  -  15 Aug 2020 11:36  --------------------------------------------------------  IN:    niCARdipine Infusion: 85 mL    Oral Fluid: 240 mL    sodium chloride 0.9%.: 40 mL  Total IN: 365 mL    OUT:    Indwelling Catheter - Urethral: 200 mL  Total OUT: 200 mL    Total NET: 165 mL    ============================ LABS =========================                        8.8    3.71  )-----------( 153      ( 15 Aug 2020 01:25 )             28.0     08-15    140  |  105  |  20  ----------------------------<  125<H>  4.0   |  23  |  1.32<H>    Ca    8.6      15 Aug 2020 01:28  Phos  3.5     08-13  Mg     1.9     08-13    TPro  5.5<L>  /  Alb  3.3  /  TBili  0.7  /  DBili  x   /  AST  38  /  ALT  31  /  AlkPhos  56  08-15    LIVER FUNCTIONS - ( 15 Aug 2020 01:28 )  Alb: 3.3 g/dL / Pro: 5.5 g/dL / ALK PHOS: 56 U/L / ALT: 31 U/L / AST: 38 U/L / GGT: x           PT/INR - ( 13 Aug 2020 23:51 )   PT: 12.3 sec;   INR: 1.04 ratio         PTT - ( 13 Aug 2020 23:51 )  PTT:21.6 sec  ABG - ( 15 Aug 2020 10:43 )  pH, Arterial: 7.43  pH, Blood: x     /  pCO2: 40    /  pO2: 78    / HCO3: 26    / Base Excess: 2.1   /  SaO2: 96          Urinalysis Basic - ( 14 Aug 2020 09:48 )    Color: Light Yellow / Appearance: Clear / S.008 / pH: x  Gluc: x / Ketone: Negative  / Bili: Negative / Urobili: Negative   Blood: x / Protein: Trace / Nitrite: Negative   Leuk Esterase: Large / RBC: 4 /hpf / WBC 12 /HPF   Sq Epi: x / Non Sq Epi: 1 /hpf / Bacteria: Negative      ======================Micro/Rad/Cardio=================  Culture: Reviewed   CXR: Reviewed  Echo:Reviewed  ======================================================  PAST MEDICAL & SURGICAL HISTORY:  H/O hemolytic anemia  H/O autoimmune hemolytic anemia  Knee pain, right  HLD (hyperlipidemia)  Former smoker  DVT of upper extremity (deep vein thrombosis)  Hepatitis C virus  GIB (gastrointestinal bleeding)  Ventricular fibrillation: s/p AICD  PAF (paroxysmal atrial fibrillation): on xarelto  Non-Ischemic Cardiomyopathy  SVT (Supraventricular Tachycardia)  HTN  CHF (Congestive Heart Failure)  S/P right heart catheterization: biopsy multiple  H/O heart transplant: 2018  Status post left hip replacement  History of Prior Ablation Treatment: for afib  AICD (Automatic Cardioverter/Defibrillator) Present: [employer] with 1 [employer] lead09- explanted and replaced with Medtronic 2 leads on 09    ====================ASSESSMENT ==============  Heart transplant 2018 for ACC/AHA stage D NICM   Supraventricular tachycardia  severe hypertension  Klebsiella oxytosis   Sepsis  Resolving acute respiratory failure   Hyperlactatemia acidosis  Leukopenia      Plan:  ====================== NEUROLOGY=====================  Nonfocal, continue to monitor neuro status    ==================== RESPIRATORY======================  Resolving respiratory failure secondary to sepsis, requires investigation to sepsis, continue oxygen therapy via 3L nasal cannula    ====================CARDIOVASCULAR==================  Currently sinus tachycardic S/p IV Amiodarone for Supraventricular tachycardia   ASA/Statin for history of HLD and TCAD prophylaxis  AICD present  Tapering prednisone as per Heme  Blood pressure support with PO Hydralazine     pravastatin 20 milliGRAM(s) Oral at bedtime  predniSONE   Tablet 40 milliGRAM(s) Oral daily  hydrALAZINE 25 milliGRAM(s) Oral every 8 hours  niCARdipine Infusion 5 mG/Hr (25 mL/Hr) IV Continuous <Continuous>    ===================HEMATOLOGIC/ONC ===================  Anemia s/p single blood transfusion   Monitor H&H     aspirin enteric coated 81 milliGRAM(s) Oral daily  VTE prophylaxis, enoxaparin Injectable 40 milliGRAM(s) SubCutaneous daily    ===================== RENAL =========================  Continue monitoring urine output, I&Os, BUN/Cr    ==================== GASTROINTESTINAL===================  GI following S/p EGD for investigation of tarry stools with no identifiable source  Awaiting results of capsule study     cholecalciferol 1000 Unit(s) Oral daily  dextrose 5%. 1000 milliLiter(s) (50 mL/Hr) IV Continuous <Continuous>  folic acid 1 milliGRAM(s) Oral daily  magnesium oxide 400 milliGRAM(s) Oral daily  GI prophylaxis, pantoprazole  Injectable 40 milliGRAM(s) IV Push every 12 hours  sodium bicarbonate 650 milliGRAM(s) Oral two times a day  sodium chloride 0.9% lock flush 3 milliLiter(s) IV Push every 8 hours  sodium chloride 0.9%. 1000 milliLiter(s) (10 mL/Hr) IV Continuous <Continuous>    =======================    ENDOCRINE  =====================  Hyperglycemia requiring humalog sliding scale, LANTUS, and glucagon prn     dextrose 50% Injectable 12.5 Gram(s) IV Push once  glucagon  Injectable 1 milliGRAM(s) IntraMuscular once PRN Glucose LESS THAN 70 milligrams/deciliter  insulin glargine Injectable (LANTUS) 14 Unit(s) SubCutaneous at bedtime  insulin lispro (HumaLOG) corrective regimen sliding scale   SubCutaneous three times a day before meals  insulin lispro Injectable (HumaLOG) 5 Unit(s) SubCutaneous three times a day with meals      ========================INFECTIOUS DISEASE================  +UTI   Bacteremia and sepsis requiring IV antibiotics--Klebsiella in blood, on Meropenem   WBC 3.7  Urine and blood cultures from --NGTD.     meropenem  IVPB 1000 milliGRAM(s) IV Intermittent every 8 hours  posaconazole DR Tablet 300 milliGRAM(s) Oral daily  trimethoprim   80 mG/sulfamethoxazole 400 mG 1 Tablet(s) Oral daily  valGANciclovir 450 milliGRAM(s) Oral daily      Patient requires continuous monitoring with bedside rhythm monitoring, pulse ox monitoring, and intermittent blood gas analysis. Care plan discussed with ICU care team. Patient remained critical and required more than usual post op care.    By signing my name below, IBreanna, attest that this documentation has been prepared under the direction and in the presence of SHELLY Ennis   Electronically signed: Katty Ramírez, 08-15-20 @ 11:36    I, Elijah Landry  , personally performed the services described in this documentation. all medical record entries made by the scribe were at my direction and in my presence. I have reviewed the chart and agree that the record reflects my personal performance and is accurate and complete  Electronically signed: SHELLY Ennis

## 2020-08-15 NOTE — PROGRESS NOTE ADULT - ASSESSMENT
70M w/ NICM s/p OHT 2018 c/b rejection, HCV s/p tx, CKD, GI bleed 2/2 small bowel angioectasias s/p APC 2018, hemolytic anemia, p/w melena x 4d. S/p EGD + push enteroscopy w/o source identified. Unable to tolerate bowel prep for colon due to tachycardia/hypotension, now in CTICU. Course c/b GI bleeding prior to endoscopy.    Impression:  #Melena – EGD/push enteroscopy negative, pending capsule read w/ plan for possible further procedures on Monday  #Bacteremia  #Tachycardia/hypotension  #S/p OHT    Recommendations:  - will read capsule today to decide what further GI procedures are indicated  - will have to discuss w/ primary cardiology team re: pt’s ability to tolerate further procedures at this time given transfer to CTICU, timing TBD based on capsule results, d/w cardiology, and clinical course  - agree w/ broad spectrum abx for bacteremia  - IV PPI BID for now  - can c/w ASA  - trend Hb, transfuse to Hb > 7, higher threshold goal is okay per cardiology  - GI will follow     Darion Lockett  Gastroenterology Fellow  NON-URGENT CONSULTS:  Please email [email or web address]  AT NIGHT AND ON WEEKENDS:  Contact on-call GI fellow via answering service ([phone]) from 5pm-8am and on weekends/holidays  MONDAY-FRIDAY 8AM-5PM:  Available via [employer] Teams  Pager# [phone] ([facility]) or [phone] ([facility])  GI Phone# [phone] ([facility])

## 2020-08-15 NOTE — PROGRESS NOTE ADULT - SUBJECTIVE AND OBJECTIVE BOX
Subjective:  Started on nicardipine overnight for asymptomatic hypertension. Denies complaints this AM.     Medications:  aspirin enteric coated 81 milliGRAM(s) Oral daily  cholecalciferol 1000 Unit(s) Oral daily  dextrose 5%. 1000 milliLiter(s) IV Continuous <Continuous>  dextrose 50% Injectable 12.5 Gram(s) IV Push once  enoxaparin Injectable 40 milliGRAM(s) SubCutaneous daily  everolimus (ZORTRESS) 0.75 milliGRAM(s) Oral <User Schedule>  folic acid 1 milliGRAM(s) Oral daily  glucagon  Injectable 1 milliGRAM(s) IntraMuscular once PRN  hydrALAZINE 25 milliGRAM(s) Oral every 8 hours  insulin glargine Injectable (LANTUS) 14 Unit(s) SubCutaneous at bedtime  insulin lispro (HumaLOG) corrective regimen sliding scale   SubCutaneous three times a day before meals  insulin lispro Injectable (HumaLOG) 5 Unit(s) SubCutaneous three times a day with meals  magnesium oxide 400 milliGRAM(s) Oral daily  meropenem  IVPB 1000 milliGRAM(s) IV Intermittent every 8 hours  niCARdipine Infusion 5 mG/Hr IV Continuous <Continuous>  omega-3-Acid Ethyl Esters 2 Gram(s) Oral two times a day  pantoprazole  Injectable 40 milliGRAM(s) IV Push every 12 hours  posaconazole DR Tablet 300 milliGRAM(s) Oral daily  pravastatin 20 milliGRAM(s) Oral at bedtime  predniSONE   Tablet 40 milliGRAM(s) Oral daily  sodium bicarbonate 650 milliGRAM(s) Oral two times a day  sodium chloride 0.9% lock flush 3 milliLiter(s) IV Push every 8 hours  sodium chloride 0.9%. 1000 milliLiter(s) IV Continuous <Continuous>  trimethoprim   80 mG/sulfamethoxazole 400 mG 1 Tablet(s) Oral daily  valGANciclovir 450 milliGRAM(s) Oral daily    Vitals:  T(C): 36.4 (08-15-20 @ 12:00), Max: 36.6 (20 @ 18:00)  HR: 125 (08-15-20 @ 14:00) (106 - 129)  BP: --  BP(mean): --  RR: 26 (08-15-20 @ 14:00) (10 - 36)  SpO2: 98% (08-15-20 @ 14:00) (95% - 100%)    Daily     Daily Weight in k.2 (15 Aug 2020 00:00)        I&O's Summary    14 Aug 2020 07:01  -  15 Aug 2020 07:00  --------------------------------------------------------  IN: 2838.9 mL / OUT: 2830 mL / NET: 8.9 mL    15 Aug 2020 07:01  -  15 Aug 2020 14:20  --------------------------------------------------------  IN: 867.5 mL / OUT: 290 mL / NET: 577.5 mL        Physical Exam:  Appearance: No Acute Distress  HEENT: JVP non elevated  Cardiovascular: tachycardic, normal S1/S2, continuous murmur noted  Respiratory: Clear to auscultation bilaterally  Gastrointestinal: Soft, Non-tender, non-distended	  Skin: no skin lesions  Neurologic: Non-focal  Extremities: No LE edema, warm and well perfused  Psychiatry: A & O x 3, Mood & affect appropriate      Labs:                        8.8    3.71  )-----------( 153      ( 15 Aug 2020 01:25 )             28.0     08-15    140  |  105  |  20  ----------------------------<  125<H>  4.0   |  23  |  1.32<H>    Ca    8.6      15 Aug 2020 01:28  Phos  3.5     08-13  Mg     1.9     08-13    TPro  5.5<L>  /  Alb  3.3  /  TBili  0.7  /  DBili  x   /  AST  38  /  ALT  31  /  AlkPhos  56  08-15      PT/INR - ( 13 Aug 2020 23:51 )   PT: 12.3 sec;   INR: 1.04 ratio         PTT - ( 13 Aug 2020 23:51 )  PTT:21.6 sec  CARDIAC MARKERS ( 13 Aug 2020 23:51 )  x     / x     / 61 U/L / x     / 2.7 ng/mL  CARDIAC MARKERS ( 13 Aug 2020 22:53 )  x     / x     / 78 U/L / x     / 3.0 ng/mL      Serum Pro-Brain Natriuretic Peptide: 736 pg/mL ( @ 22:28)

## 2020-08-15 NOTE — PROGRESS NOTE ADULT - PROBLEM SELECTOR PLAN 1
-C/w ASA/statin for TCAD ppx  -C/w everolimus 0.75mg BID, will follow levels periodically while hospitalized (level 8/14 in lab). Goal 8-10.  - Continue high dose prednisone which is being weaned as below   -C/w Bactrim SS, Valtrex, posiconazole for ppx  -Off cellcept while on high dose steroids. Will eventually need to start cellcept as steroids weaned

## 2020-08-15 NOTE — PROGRESS NOTE ADULT - PROBLEM SELECTOR PLAN 2
-Responded appropriately to 1u PRBC but transfused 2u PRBC 8/14 for unclear reasons. Do not transfuse for Hgb < 7.   -Appreciate heme recs, no signs of hemolysis at this time  -Check iron studies  -Tapering prednisone per hematology: 40 mg daily followed by 10 mg decrease every 10 days  -EGD unremarkable; await VCE  -Possible c-scope pending results of VCE

## 2020-08-15 NOTE — PROGRESS NOTE ADULT - PROBLEM SELECTOR PLAN 7
- discontinue nicardipine and start hydralizine 25 TID (will switch to amlodipine when off nicardipine).

## 2020-08-15 NOTE — PROGRESS NOTE ADULT - ASSESSMENT
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 He has responded appropriately to a blood transfusion. Labs were not consistent with hemolysis and he reported dark stools for which EGD/enteroscopy was performed which did not reveal source of bleeding with VCE pending. He developed acute respiratory distress and profound sinus tachycardia on 8/13 in setting of Klebsiella bacteremia of unclear origin (preceded EGD) for which CT performed which suggests possible pneumonia. He has clinically improved with antibiotics.       Review of pertinent studies  8/2020 labs: Direct Jacky +, 4.6% reticulocytes with low RI, normal bilirubin,  (stable). Haptoglobin normal.

## 2020-08-15 NOTE — PROGRESS NOTE ADULT - SUBJECTIVE AND OBJECTIVE BOX
Chief Complaint:  Patient is a 70y old  Male who presents with a chief complaint of SOB/anemia (15 Aug 2020 12:35)    Reason for consult: melena    Interval Events: Pt having dark brown BMs, responded appropriately to transfusion, capsule is downloading, to be read tomorrow AM by attending.     Hospital Medications:  aspirin enteric coated 81 milliGRAM(s) Oral daily  cholecalciferol 1000 Unit(s) Oral daily  dextrose 5%. 1000 milliLiter(s) IV Continuous <Continuous>  dextrose 50% Injectable 12.5 Gram(s) IV Push once  enoxaparin Injectable 40 milliGRAM(s) SubCutaneous daily  everolimus (ZORTRESS) 0.75 milliGRAM(s) Oral <User Schedule>  folic acid 1 milliGRAM(s) Oral daily  glucagon  Injectable 1 milliGRAM(s) IntraMuscular once PRN  hydrALAZINE 25 milliGRAM(s) Oral every 8 hours  insulin glargine Injectable (LANTUS) 14 Unit(s) SubCutaneous at bedtime  insulin lispro (HumaLOG) corrective regimen sliding scale   SubCutaneous three times a day before meals  insulin lispro Injectable (HumaLOG) 5 Unit(s) SubCutaneous three times a day with meals  magnesium oxide 400 milliGRAM(s) Oral daily  meropenem  IVPB 1000 milliGRAM(s) IV Intermittent every 8 hours  niCARdipine Infusion 5 mG/Hr IV Continuous <Continuous>  omega-3-Acid Ethyl Esters 2 Gram(s) Oral two times a day  pantoprazole  Injectable 40 milliGRAM(s) IV Push every 12 hours  posaconazole DR Tablet 300 milliGRAM(s) Oral daily  pravastatin 20 milliGRAM(s) Oral at bedtime  predniSONE   Tablet 40 milliGRAM(s) Oral daily  sodium bicarbonate 650 milliGRAM(s) Oral two times a day  sodium chloride 0.9% lock flush 3 milliLiter(s) IV Push every 8 hours  sodium chloride 0.9%. 1000 milliLiter(s) IV Continuous <Continuous>  trimethoprim   80 mG/sulfamethoxazole 400 mG 1 Tablet(s) Oral daily  valGANciclovir 450 milliGRAM(s) Oral daily      ROS:   General:  No  fevers, chills, night sweats, fatigue  Eyes:  Good vision, no reported pain  ENT:  No sore throat, pain, runny nose  CV:  No pain, palpitations  Pulm:  No dyspnea, cough  GI:  See HPI, otherwise negative  :  No  incontinence, nocturia  Muscle:  No pain, weakness  Neuro:  No memory problems  Psych:  No insomnia, mood problems, depression  Endocrine:  No polyuria, polydipsia, cold/heat intolerance  Heme:  No petechiae, ecchymosis, easy bruisability  Skin:  No rash    PHYSICAL EXAM:   Vital Signs:  Vital Signs Last 24 Hrs  T(C): 36.4 (15 Aug 2020 12:00), Max: 36.6 (14 Aug 2020 18:00)  T(F): 97.6 (15 Aug 2020 12:00), Max: 97.9 (14 Aug 2020 20:00)  HR: 125 (15 Aug 2020 13:00) (106 - 129)  BP: --  BP(mean): --  RR: 30 (15 Aug 2020 13:) (10 - 36)  SpO2: 99% (15 Aug 2020 13:00) (95% - 100%)  Daily     Daily Weight in k.2 (15 Aug 2020 00:00)    GENERAL: no acute distress  NEURO: alert, no asterixis  HEENT: anicteric sclera, no conjunctival pallor appreciated  CHEST: no respiratory distress, no accessory muscle use  CARDIAC: regular rate, rhythm  ABDOMEN: soft, non-tender, non-distended, no rebound or guarding  EXTREMITIES: warm, well perfused, no edema  SKIN: no lesions noted    LABS: reviewed                        8.8    3.71  )-----------( 153      ( 15 Aug 2020 01:25 )             28.0     08-15    140  |  105  |  20  ----------------------------<  125<H>  4.0   |  23  |  1.32<H>    Ca    8.6      15 Aug 2020 01:28  Phos  3.5     08-13  Mg     1.9     08-13    TPro  5.5<L>  /  Alb  3.3  /  TBili  0.7  /  DBili  x   /  AST  38  /  ALT  31  /  AlkPhos  56  08-15    LIVER FUNCTIONS - ( 15 Aug 2020 01:28 )  Alb: 3.3 g/dL / Pro: 5.5 g/dL / ALK PHOS: 56 U/L / ALT: 31 U/L / AST: 38 U/L / GGT: x             Interval Diagnostic Studies: see sunrise for full report

## 2020-08-15 NOTE — PROGRESS NOTE ADULT - PROBLEM SELECTOR PLAN 6
- Klebsiella bacteremia from 8/13 which have since cleared. CT with ? pneumonia and urine culture with > 3 organisms concerning for contaminant.   - ID recs appreciated  - Continue meropenem pending sensitivities.

## 2020-08-16 LAB
BLD GP AB SCN SERPL QL: NEGATIVE — SIGNIFICANT CHANGE UP
EVEROLIMUS, WHOLE BLOOD RESULT: 8.4 NG/ML — HIGH (ref 3–?)
GLUCOSE BLDC GLUCOMTR-MCNC: 138 MG/DL — HIGH (ref 70–99)
GLUCOSE BLDC GLUCOMTR-MCNC: 151 MG/DL — HIGH (ref 70–99)
GLUCOSE BLDC GLUCOMTR-MCNC: 159 MG/DL — HIGH (ref 70–99)
GLUCOSE BLDC GLUCOMTR-MCNC: 207 MG/DL — HIGH (ref 70–99)
HCT VFR BLD CALC: 25.7 % — LOW (ref 39–50)
HGB BLD-MCNC: 8.1 G/DL — LOW (ref 13–17)
MAGNESIUM SERPL-MCNC: 2.6 MG/DL — SIGNIFICANT CHANGE UP (ref 1.6–2.6)
MCHC RBC-ENTMCNC: 31.2 PG — SIGNIFICANT CHANGE UP (ref 27–34)
MCHC RBC-ENTMCNC: 31.5 GM/DL — LOW (ref 32–36)
MCV RBC AUTO: 98.8 FL — SIGNIFICANT CHANGE UP (ref 80–100)
NRBC # BLD: 0 /100 WBCS — SIGNIFICANT CHANGE UP (ref 0–0)
PLATELET # BLD AUTO: 138 K/UL — LOW (ref 150–400)
RBC # BLD: 2.6 M/UL — LOW (ref 4.2–5.8)
RBC # FLD: 20.6 % — HIGH (ref 10.3–14.5)
RH IG SCN BLD-IMP: POSITIVE — SIGNIFICANT CHANGE UP
WBC # BLD: 2.84 K/UL — LOW (ref 3.8–10.5)
WBC # FLD AUTO: 2.84 K/UL — LOW (ref 3.8–10.5)

## 2020-08-16 PROCEDURE — 91110 GI TRC IMG INTRAL ESOPH-ILE: CPT | Mod: 26,GC

## 2020-08-16 PROCEDURE — 71045 X-RAY EXAM CHEST 1 VIEW: CPT | Mod: 26

## 2020-08-16 PROCEDURE — 99292 CRITICAL CARE ADDL 30 MIN: CPT

## 2020-08-16 PROCEDURE — 99291 CRITICAL CARE FIRST HOUR: CPT

## 2020-08-16 PROCEDURE — 99232 SBSQ HOSP IP/OBS MODERATE 35: CPT

## 2020-08-16 PROCEDURE — 99232 SBSQ HOSP IP/OBS MODERATE 35: CPT | Mod: GC

## 2020-08-16 RX ORDER — HYDRALAZINE HCL 50 MG
50 TABLET ORAL EVERY 8 HOURS
Refills: 0 | Status: DISCONTINUED | OUTPATIENT
Start: 2020-08-16 | End: 2020-08-18

## 2020-08-16 RX ADMIN — POSACONAZOLE 300 MILLIGRAM(S): 100 TABLET, DELAYED RELEASE ORAL at 11:15

## 2020-08-16 RX ADMIN — Medication 650 MILLIGRAM(S): at 17:25

## 2020-08-16 RX ADMIN — Medication 2 GRAM(S): at 17:25

## 2020-08-16 RX ADMIN — PANTOPRAZOLE SODIUM 40 MILLIGRAM(S): 20 TABLET, DELAYED RELEASE ORAL at 17:24

## 2020-08-16 RX ADMIN — ENOXAPARIN SODIUM 40 MILLIGRAM(S): 100 INJECTION SUBCUTANEOUS at 11:15

## 2020-08-16 RX ADMIN — Medication 1 TABLET(S): at 11:15

## 2020-08-16 RX ADMIN — Medication 20 MILLIGRAM(S): at 21:11

## 2020-08-16 RX ADMIN — Medication 40 MILLIGRAM(S): at 05:59

## 2020-08-16 RX ADMIN — Medication 5 UNIT(S): at 08:17

## 2020-08-16 RX ADMIN — Medication 5 UNIT(S): at 12:05

## 2020-08-16 RX ADMIN — PANTOPRAZOLE SODIUM 40 MILLIGRAM(S): 20 TABLET, DELAYED RELEASE ORAL at 05:59

## 2020-08-16 RX ADMIN — Medication 50 MILLIGRAM(S): at 14:19

## 2020-08-16 RX ADMIN — SODIUM CHLORIDE 3 MILLILITER(S): 9 INJECTION INTRAMUSCULAR; INTRAVENOUS; SUBCUTANEOUS at 21:13

## 2020-08-16 RX ADMIN — INSULIN GLARGINE 14 UNIT(S): 100 INJECTION, SOLUTION SUBCUTANEOUS at 21:12

## 2020-08-16 RX ADMIN — Medication 650 MILLIGRAM(S): at 05:59

## 2020-08-16 RX ADMIN — SODIUM CHLORIDE 3 MILLILITER(S): 9 INJECTION INTRAMUSCULAR; INTRAVENOUS; SUBCUTANEOUS at 13:54

## 2020-08-16 RX ADMIN — MEROPENEM 100 MILLIGRAM(S): 1 INJECTION INTRAVENOUS at 21:12

## 2020-08-16 RX ADMIN — Medication 1 MILLIGRAM(S): at 11:15

## 2020-08-16 RX ADMIN — EVEROLIMUS 0.75 MILLIGRAM(S): 10 TABLET ORAL at 20:13

## 2020-08-16 RX ADMIN — MEROPENEM 100 MILLIGRAM(S): 1 INJECTION INTRAVENOUS at 05:59

## 2020-08-16 RX ADMIN — Medication 1000 UNIT(S): at 11:15

## 2020-08-16 RX ADMIN — Medication 2: at 08:16

## 2020-08-16 RX ADMIN — SODIUM CHLORIDE 3 MILLILITER(S): 9 INJECTION INTRAMUSCULAR; INTRAVENOUS; SUBCUTANEOUS at 06:00

## 2020-08-16 RX ADMIN — VALGANCICLOVIR 450 MILLIGRAM(S): 450 TABLET, FILM COATED ORAL at 11:15

## 2020-08-16 RX ADMIN — Medication 2 GRAM(S): at 05:59

## 2020-08-16 RX ADMIN — MAGNESIUM OXIDE 400 MG ORAL TABLET 400 MILLIGRAM(S): 241.3 TABLET ORAL at 11:15

## 2020-08-16 RX ADMIN — Medication 50 MILLIGRAM(S): at 21:11

## 2020-08-16 RX ADMIN — Medication 2: at 17:25

## 2020-08-16 RX ADMIN — MEROPENEM 100 MILLIGRAM(S): 1 INJECTION INTRAVENOUS at 14:19

## 2020-08-16 RX ADMIN — Medication 25 MILLIGRAM(S): at 03:21

## 2020-08-16 RX ADMIN — Medication 5 UNIT(S): at 17:25

## 2020-08-16 RX ADMIN — Medication 4: at 12:04

## 2020-08-16 RX ADMIN — Medication 81 MILLIGRAM(S): at 11:15

## 2020-08-16 RX ADMIN — EVEROLIMUS 0.75 MILLIGRAM(S): 10 TABLET ORAL at 08:17

## 2020-08-16 NOTE — PROGRESS NOTE ADULT - PROBLEM SELECTOR PLAN 6
- discontinue nicardipine and continue hydralizine 25 TID   - will switch hydralizine to amlodipine when off nicardipine

## 2020-08-16 NOTE — PROGRESS NOTE ADULT - PROBLEM SELECTOR PLAN 1
-C/w ASA/statin for TCAD ppx  -C/w everolimus 0.75mg BID, will follow levels periodically while hospitalized (true trough in lab). Goal 8-10.  - Continue high dose prednisone which is being weaned as below   -C/w Bactrim SS, Valtrex, posiconazole for ppx  -Off cellcept while on high dose steroids. Will eventually need to start cellcept as steroids weaned

## 2020-08-16 NOTE — PROGRESS NOTE ADULT - SUBJECTIVE AND OBJECTIVE BOX
Chief Complaint:  Patient is a 70y old  Male who presents with a chief complaint of SOB/anemia (15 Aug 2020 23:49)    Reason for consult: melena    Interval Events: Brown stool, Hb slightly downtrending.    Formal capsule report pending. However, gastric ulcer, duodenal erosions vs angioectasia, and 1 small bowel angioectasia seen. No active bleeding, no blood in the colon.     Hospital Medications:  aspirin enteric coated 81 milliGRAM(s) Oral daily  cholecalciferol 1000 Unit(s) Oral daily  dextrose 5%. 1000 milliLiter(s) IV Continuous <Continuous>  dextrose 50% Injectable 12.5 Gram(s) IV Push once  enoxaparin Injectable 40 milliGRAM(s) SubCutaneous daily  everolimus (ZORTRESS) 0.75 milliGRAM(s) Oral <User Schedule>  folic acid 1 milliGRAM(s) Oral daily  glucagon  Injectable 1 milliGRAM(s) IntraMuscular once PRN  hydrALAZINE 50 milliGRAM(s) Oral every 8 hours  insulin glargine Injectable (LANTUS) 14 Unit(s) SubCutaneous at bedtime  insulin lispro (HumaLOG) corrective regimen sliding scale   SubCutaneous three times a day before meals  insulin lispro Injectable (HumaLOG) 5 Unit(s) SubCutaneous three times a day with meals  magnesium oxide 400 milliGRAM(s) Oral daily  meropenem  IVPB 1000 milliGRAM(s) IV Intermittent every 8 hours  niCARdipine Infusion 5 mG/Hr IV Continuous <Continuous>  omega-3-Acid Ethyl Esters 2 Gram(s) Oral two times a day  pantoprazole  Injectable 40 milliGRAM(s) IV Push every 12 hours  posaconazole DR Tablet 300 milliGRAM(s) Oral daily  pravastatin 20 milliGRAM(s) Oral at bedtime  predniSONE   Tablet 40 milliGRAM(s) Oral daily  sodium bicarbonate 650 milliGRAM(s) Oral two times a day  sodium chloride 0.9% lock flush 3 milliLiter(s) IV Push every 8 hours  sodium chloride 0.9%. 1000 milliLiter(s) IV Continuous <Continuous>  trimethoprim   80 mG/sulfamethoxazole 400 mG 1 Tablet(s) Oral daily  valGANciclovir 450 milliGRAM(s) Oral daily      ROS:   General:  No  fevers, chills, night sweats, fatigue  Eyes:  Good vision, no reported pain  ENT:  No sore throat, pain, runny nose  CV:  No pain, palpitations  Pulm:  No dyspnea, cough  GI:  See HPI, otherwise negative  :  No  incontinence, nocturia  Muscle:  No pain, weakness  Neuro:  No memory problems  Psych:  No insomnia, mood problems, depression  Endocrine:  No polyuria, polydipsia, cold/heat intolerance  Heme:  No petechiae, ecchymosis, easy bruisability  Skin:  No rash    PHYSICAL EXAM:   Vital Signs:  Vital Signs Last 24 Hrs  T(C): 36.4 (16 Aug 2020 04:00), Max: 36.4 (15 Aug 2020 12:00)  T(F): 97.5 (16 Aug 2020 04:00), Max: 97.6 (15 Aug 2020 12:00)  HR: 123 (16 Aug 2020 10:00) (120 - 138)  BP: --  BP(mean): --  RR: 18 (16 Aug 2020 10:00) (10 - 31)  SpO2: 100% (16 Aug 2020 10:00) (96% - 100%)  Daily     Daily Weight in k.3 (16 Aug 2020 00:00)    GENERAL: no acute distress  NEURO: alert, no asterixis  HEENT: anicteric sclera, no conjunctival pallor appreciated  CHEST: no respiratory distress, no accessory muscle use  CARDIAC: regular rate, rhythm  ABDOMEN: soft, non-tender, non-distended, no rebound or guarding  EXTREMITIES: warm, well perfused, no edema  SKIN: no lesions noted    LABS: reviewed                        8.1    2.84  )-----------( 138      ( 16 Aug 2020 01:03 )             25.7     08-16    141  |  106  |  27<H>  ----------------------------<  102<H>  4.7   |  24  |  1.40<H>    Ca    8.8      16 Aug 2020 01:03  Mg     2.6     08-16    TPro  5.7<L>  /  Alb  3.3  /  TBili  0.4  /  DBili  x   /  AST  34  /  ALT  27  /  AlkPhos  53  08-16    LIVER FUNCTIONS - ( 16 Aug 2020 01:03 )  Alb: 3.3 g/dL / Pro: 5.7 g/dL / ALK PHOS: 53 U/L / ALT: 27 U/L / AST: 34 U/L / GGT: x             Interval Diagnostic Studies: see sunrise for full report

## 2020-08-16 NOTE — PROGRESS NOTE ADULT - SUBJECTIVE AND OBJECTIVE BOX
Subjective:  No overnight events    Medications:  aspirin enteric coated 81 milliGRAM(s) Oral daily  cholecalciferol 1000 Unit(s) Oral daily  dextrose 5%. 1000 milliLiter(s) IV Continuous <Continuous>  dextrose 50% Injectable 12.5 Gram(s) IV Push once  enoxaparin Injectable 40 milliGRAM(s) SubCutaneous daily  everolimus (ZORTRESS) 0.75 milliGRAM(s) Oral <User Schedule>  folic acid 1 milliGRAM(s) Oral daily  glucagon  Injectable 1 milliGRAM(s) IntraMuscular once PRN  hydrALAZINE 50 milliGRAM(s) Oral every 8 hours  insulin glargine Injectable (LANTUS) 14 Unit(s) SubCutaneous at bedtime  insulin lispro (HumaLOG) corrective regimen sliding scale   SubCutaneous three times a day before meals  insulin lispro Injectable (HumaLOG) 5 Unit(s) SubCutaneous three times a day with meals  magnesium oxide 400 milliGRAM(s) Oral daily  meropenem  IVPB 1000 milliGRAM(s) IV Intermittent every 8 hours  niCARdipine Infusion 5 mG/Hr IV Continuous <Continuous>  omega-3-Acid Ethyl Esters 2 Gram(s) Oral two times a day  pantoprazole  Injectable 40 milliGRAM(s) IV Push every 12 hours  posaconazole DR Tablet 300 milliGRAM(s) Oral daily  pravastatin 20 milliGRAM(s) Oral at bedtime  predniSONE   Tablet 40 milliGRAM(s) Oral daily  sodium bicarbonate 650 milliGRAM(s) Oral two times a day  sodium chloride 0.9% lock flush 3 milliLiter(s) IV Push every 8 hours  sodium chloride 0.9%. 1000 milliLiter(s) IV Continuous <Continuous>  trimethoprim   80 mG/sulfamethoxazole 400 mG 1 Tablet(s) Oral daily  valGANciclovir 450 milliGRAM(s) Oral daily    Vitals:  T(C): 36.5 (20 @ 08:00), Max: 36.5 (20 @ 08:00)  HR: 121 (20 @ 12:00) (120 - 138)  BP: --  BP(mean): --  RR: 24 (20 @ 12:00) (10 - 38)  SpO2: 100% (20 @ 12:00) (96% - 100%)    Daily     Daily Weight in k.3 (16 Aug 2020 00:00)        I&O's Summary    15 Aug 2020 07:01  -  16 Aug 2020 07:00  --------------------------------------------------------  IN: 1880 mL / OUT: 740 mL / NET: 1140 mL    16 Aug 2020 07:  -  16 Aug 2020 13:36  --------------------------------------------------------  IN: 65 mL / OUT: 0 mL / NET: 65 mL        Physical Exam:  Appearance: No Acute Distress  HEENT: JVP non elevated  Cardiovascular: tachycardic, normal S1/S2, continuous murmur noted  Respiratory: Clear to auscultation bilaterally  Gastrointestinal: Soft, Non-tender, non-distended	  Skin: no skin lesions  Neurologic: Non-focal  Extremities: No LE edema, warm and well perfused  Psychiatry: A & O x 3, Mood & affect appropriate      Labs:                        8.1    2.84  )-----------( 138      ( 16 Aug 2020 01:03 )             25.7     08-16    141  |  106  |  27<H>  ----------------------------<  102<H>  4.7   |  24  |  1.40<H>    Ca    8.8      16 Aug 2020 01:03  Mg     2.6     08-16    TPro  5.7<L>  /  Alb  3.3  /  TBili  0.4  /  DBili  x   /  AST  34  /  ALT  27  /  AlkPhos  53  08-            Serum Pro-Brain Natriuretic Peptide: 736 pg/mL ( @ 22:28)

## 2020-08-16 NOTE — PROGRESS NOTE ADULT - ASSESSMENT
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 He has received a total of 3 units PRBC this admission with slow downtrend of hemoglobin. Labs were not consistent with hemolysis and he reported dark stools for which EGD/enteroscopy was performed which did not reveal source of bleeding but preliminary VCE showing possible gastric ulcer, duodenal angioectasia/erosions. He developed acute respiratory distress and profound sinus tachycardia on 8/13 in setting of Klebsiella bacteremia of unclear origin (preceded EGD) for which CT performed which suggests possible pneumonia. He has clinically improved with antibiotics and awaiting repeat push enteroscopy.       Review of pertinent studies  8/2020 labs: Direct Jacky +, 4.6% reticulocytes with low RI, normal bilirubin,  (stable). Haptoglobin normal.

## 2020-08-16 NOTE — PROGRESS NOTE ADULT - ASSESSMENT
70M w/ NICM s/p OHT 2018 c/b rejection, HCV s/p tx, CKD, GI bleed 2/2 small bowel angioectasias s/p APC 2018, hemolytic anemia, p/w melena x 4d. S/p EGD + push enteroscopy w/o source identified. Unable to tolerate bowel prep for colon due to tachycardia/hypotension, now in CTICU. Course c/b GI bleeding prior to endoscopy.    Impression:  #Melena – EGD/push enteroscopy negative, formal capsule report pending but possible gastric ulcer, duodenal angioectasisas/erosions, and 1 small bowel angioectasia seen, no active bleeding  #Bacteremia  #Tachycardia/hypotension  #S/p OHT    Recommendations:  - recommend repeating EGD/Push given capsule findings  - d/w primary team, okay from cardiac perspective to do procedure Monday  - would not prep for colon at this time  - NPO @ [facility]  - IV PPI BID for now  - can c/w ASA  - trend Hb, transfuse to Hb > 7, higher threshold goal is okay per cardiology  - GI will follow     Plan d/w primary team    Darion Lockett  Gastroenterology Fellow  NON-URGENT CONSULTS:  Please email [email or web address]  AT NIGHT AND ON WEEKENDS:  Contact on-call GI fellow via answering service ([phone]) from 5pm-8am and on weekends/holidays  MONDAY-FRIDAY 8AM-5PM:  Available via [employer] Teams  Pager# [phone] ([facility]) or [phone] ([facility])  GI Phone# [phone] ([facility])

## 2020-08-16 NOTE — PROGRESS NOTE ADULT - ATTENDING COMMENTS
VCE with gastric ulcer and proximal non-specific vascular lesions.   No active bleeding seen throughout capsule.   Also with submucosal lesion, likely lipoma seen in SB.   Would plan for EGD/push tomorrow for repeat evaluation.

## 2020-08-16 NOTE — PROGRESS NOTE ADULT - SUBJECTIVE AND OBJECTIVE BOX
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 Patient was treated w/ prednisone and Rituximab. Tacro was discontinued and patient was also transitioned to everolimus  Admitted  6/16-6/19  for hyperglycemia.  Reported to transplant team having one done black tarry stool-  instructed to  present to [facility] for hemolytic anemia. Patient has been following with Hematology outpatient.  Denies CP  N/V diarrhea SOB. (11 Aug 2020 20:08)      Surgery/Hospital Course:  8/11 Admitted to [facility] with symptomatic anemia  8/13 EGD for investigation of tarry stools    Today:    REVIEW OF SYSTEMS:  Gen: No fever  EYES/ENT: No visual changes;  No vertigo or throat pain   NECK: No pain   RES:  No shortness of breath or Cough  CARD: No chest pain   GI: No abdominal pain  : No dysuria  NEURO: No weakness  SKIN: No itching, rashes     ICU Vital Signs Last 24 Hrs  T(C): 36.5 (16 Aug 2020 08:00), Max: 36.5 (16 Aug 2020 08:00)  T(F): 97.7 (16 Aug 2020 08:00), Max: 97.7 (16 Aug 2020 08:00)  HR: 134 (16 Aug 2020 11:00) (120 - 138)  BP: --  BP(mean): --  ABP: 267/267 (16 Aug 2020 11:00) (103/54 - 267/267)  ABP(mean): 267 (16 Aug 2020 11:00) (68 - 267)  RR: 38 (16 Aug 2020 11:00) (10 - 38)  SpO2: 100% (16 Aug 2020 11:00) (96% - 100%)      Physical Exam:  Gen:  Awake, alert   CNS: non focal 	  Neck: no JVD  RES : clear , no wheezing              CVS: Sinus tachycardia. Normal S1/S2  Abd: Soft, mildly distended. Bowel sounds present. +tarry stools with no melena  Skin: No rash.  Ext:  no edema    ============================I/O===========================   I&O's Detail    15 Aug 2020 07:01  -  16 Aug 2020 07:00  --------------------------------------------------------  IN:    IV PiggyBack: 150 mL    niCARdipine Infusion: 650 mL    Oral Fluid: 840 mL    sodium chloride 0.9%.: 240 mL  Total IN: 1880 mL    OUT:    Indwelling Catheter - Urethral: 290 mL    Voided: 450 mL  Total OUT: 740 mL    Total NET: 1140 mL      16 Aug 2020 07:01  -  16 Aug 2020 12:28  --------------------------------------------------------  IN:    niCARdipine Infusion: 35 mL    sodium chloride 0.9%.: 30 mL  Total IN: 65 mL    OUT:  Total OUT: 0 mL    Total NET: 65 mL        ============================ LABS =========================                        8.1    2.84  )-----------( 138      ( 16 Aug 2020 01:03 )             25.7     08-16    141  |  106  |  27<H>  ----------------------------<  102<H>  4.7   |  24  |  1.40<H>    Ca    8.8      16 Aug 2020 01:03  Mg     2.6     08-16    TPro  5.7<L>  /  Alb  3.3  /  TBili  0.4  /  DBili  x   /  AST  34  /  ALT  27  /  AlkPhos  53  08-16    LIVER FUNCTIONS - ( 16 Aug 2020 01:03 )  Alb: 3.3 g/dL / Pro: 5.7 g/dL / ALK PHOS: 53 U/L / ALT: 27 U/L / AST: 34 U/L / GGT: x             ABG - ( 15 Aug 2020 21:59 )  pH, Arterial: 7.42  pH, Blood: x     /  pCO2: 42    /  pO2: 113   / HCO3: 26    / Base Excess: 2.1   /  SaO2: 99                  ======================Micro/Rad/Cardio=================  Culture: Reviewed   CXR: Reviewed  Echo:Reviewed  ======================================================  PAST MEDICAL & SURGICAL HISTORY:  H/O hemolytic anemia  H/O autoimmune hemolytic anemia  Knee pain, right  HLD (hyperlipidemia)  Former smoker  DVT of upper extremity (deep vein thrombosis)  Hepatitis C virus  GIB (gastrointestinal bleeding)  Ventricular fibrillation: s/p AICD  PAF (paroxysmal atrial fibrillation): on xarelto  Non-Ischemic Cardiomyopathy  SVT (Supraventricular Tachycardia)  HTN  CHF (Congestive Heart Failure)  S/P right heart catheterization: biopsy multiple  H/O heart transplant: 2/2018  Status post left hip replacement  History of Prior Ablation Treatment: for afib  AICD (Automatic Cardioverter/Defibrillator) Present: [employer] with 1 [employer] lead4/1/09- explanted and replaced with [employer]tr[employer] 2 leads on 9/2/09    ====================ASSESSMENT ==============  Heart transplant 2/2018 for ACC/AHA stage D NICM   Supraventricular tachycardia  severe hypertension  Klebsiella oxytoca   Sepsis  Resolving acute respiratory failure   Hyperlactatemia acidosis  Leukopenia      Plan:  ====================== NEUROLOGY=====================  Nonfocal, continue to monitor neuro status    ==================== RESPIRATORY======================  Resolving respiratory failure secondary to sepsis, requires investigation to sepsis, continue oxygen therapy via 5L nasal cannula    ====================CARDIOVASCULAR==================  Currently sinus tachycardic S/p IV Amiodarone for Supraventricular tachycardia   ASA/Statin for history of HLD and TCAD prophylaxis  AICD present  Tapering prednisone as per Heme  Blood pressure support with PO Hydralazine in anticipation of discontinuing IV Cardene    hydrALAZINE 50 milliGRAM(s) Oral every 8 hours  niCARdipine Infusion 5 mG/Hr (25 mL/Hr) IV Continuous <Continuous>  pravastatin 20 milliGRAM(s) Oral at bedtime  predniSONE   Tablet 40 milliGRAM(s) Oral daily    ===================HEMATOLOGIC/ONC ===================  Anemia- Possible GI bleed v.s hemolysis ?  s/p single blood transfusion   Monitor H&H     aspirin enteric coated 81 milliGRAM(s) Oral daily  VTE prophylaxis, enoxaparin Injectable 40 milliGRAM(s) SubCutaneous daily    ===================== RENAL =========================  Continue monitoring urine output, I&Os, BUN/Cr    ==================== GASTROINTESTINAL===================  GI following S/p EGD for investigation of tarry stools with no identifiable source  Awaiting results of capsule study   Colonoscopy to r/o GI bleed on hold in setting of sepsis     cholecalciferol 1000 Unit(s) Oral daily  dextrose 5%. 1000 milliLiter(s) (50 mL/Hr) IV Continuous <Continuous>  folic acid 1 milliGRAM(s) Oral daily  magnesium oxide 400 milliGRAM(s) Oral daily  GI prophylaxis, pantoprazole  Injectable 40 milliGRAM(s) IV Push every 12 hours  sodium bicarbonate 650 milliGRAM(s) Oral two times a day  sodium chloride 0.9% lock flush 3 milliLiter(s) IV Push every 8 hours  sodium chloride 0.9%. 1000 milliLiter(s) (10 mL/Hr) IV Continuous <Continuous>    =======================    ENDOCRINE  =====================  Hyperglycemia requiring humalog sliding scale, LANTUS, and glucagon prn     dextrose 50% Injectable 12.5 Gram(s) IV Push once  glucagon  Injectable 1 milliGRAM(s) IntraMuscular once PRN Glucose LESS THAN 70 milligrams/deciliter  insulin glargine Injectable (LANTUS) 14 Unit(s) SubCutaneous at bedtime  insulin lispro (HumaLOG) corrective regimen sliding scale   SubCutaneous three times a day before meals  insulin lispro Injectable (HumaLOG) 5 Unit(s) SubCutaneous three times a day with meals    ========================INFECTIOUS DISEASE================  Bacteremia and sepsis requiring IV antibiotics--Klebsiella in blood, on Meropenem   WBC 2.8   Urine and blood cultures from 8/14--NGTD.   F/U BCx x2 sent yesterday    meropenem  IVPB 1000 milliGRAM(s) IV Intermittent every 8 hours  posaconazole DR Tablet 300 milliGRAM(s) Oral daily  trimethoprim   80 mG/sulfamethoxazole 400 mG 1 Tablet(s) Oral daily  valGANciclovir 450 milliGRAM(s) Oral daily      Patient requires continuous monitoring with bedside rhythm monitoring, pulse ox monitoring, and intermittent blood gas analysis. Care plan discussed with ICU care team. Patient remained critical and required more than usual post op care.    By signing my name below, Breanna GUDINO, attest that this documentation has been prepared under the direction and in the presence of SHELLY Ennis   Electronically signed: Katty Ramírez, 08-16-20 @ 12:28    Elijah GUDINO , personally performed the services described in this documentation. all medical record entries made by the scribe were at my direction and in my presence. 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 Patient was treated w/ prednisone and Rituximab. Tacro was discontinued and patient was also transitioned to everolimus  Admitted  6/16-6/19  for hyperglycemia.  Reported to transplant team having one done black tarry stool-  instructed to  present to [facility] for hemolytic anemia. Patient has been following with Hematology outpatient.  Denies CP  N/V diarrhea SOB. (11 Aug 2020 20:08)      Surgery/Hospital Course:  8/11 Admitted to [facility] with symptomatic anemia  8/13 EGD for investigation of tarry stools    Today: Capsule study from yesterday revealed ?angiectasias and likely gastric ulcer. Per GI recommends repeating endoscopy with push study--will plan to complete tomorrow, NPO at midnight. H&H stable. Increase Hydralazine to dc the Cardene drip.     REVIEW OF SYSTEMS:  Gen: No fever  EYES/ENT: No visual changes;  No vertigo or throat pain   NECK: No pain   RES:  No shortness of breath or Cough  CARD: No chest pain   GI: No abdominal pain  : No dysuria  NEURO: No weakness  SKIN: No itching, rashes     ICU Vital Signs Last 24 Hrs  T(C): 36.5 (16 Aug 2020 08:00), Max: 36.5 (16 Aug 2020 08:00)  T(F): 97.7 (16 Aug 2020 08:00), Max: 97.7 (16 Aug 2020 08:00)  HR: 134 (16 Aug 2020 11:00) (120 - 138)  BP: --  BP(mean): --  ABP: 267/267 (16 Aug 2020 11:00) (103/54 - 267/267)  ABP(mean): 267 (16 Aug 2020 11:00) (68 - 267)  RR: 38 (16 Aug 2020 11:00) (10 - 38)  SpO2: 100% (16 Aug 2020 11:00) (96% - 100%)    Physical Exam:  Gen:  Awake, alert   CNS: non focal 	  Neck: no JVD  RES : clear , no wheezing              CVS: Sinus tachycardia. Normal S1/S2  Abd: Soft, mildly distended. Bowel sounds present. +tarry stools with no melena  Skin: No rash.  Ext:  no edema    ============================I/O===========================   I&O's Detail    15 Aug 2020 07:01  -  16 Aug 2020 07:00  --------------------------------------------------------  IN:    IV PiggyBack: 150 mL    niCARdipine Infusion: 650 mL    Oral Fluid: 840 mL    sodium chloride 0.9%.: 240 mL  Total IN: 1880 mL    OUT:    Indwelling Catheter - Urethral: 290 mL    Voided: 450 mL  Total OUT: 740 mL    Total NET: 1140 mL      16 Aug 2020 07:01  -  16 Aug 2020 12:28  --------------------------------------------------------  IN:    niCARdipine Infusion: 35 mL    sodium chloride 0.9%.: 30 mL  Total IN: 65 mL    OUT:  Total OUT: 0 mL    Total NET: 65 mL    ============================ LABS =========================                        8.1    2.84  )-----------( 138      ( 16 Aug 2020 01:03 )             25.7     08-16    141  |  106  |  27<H>  ----------------------------<  102<H>  4.7   |  24  |  1.40<H>    Ca    8.8      16 Aug 2020 01:03  Mg     2.6     08-16    TPro  5.7<L>  /  Alb  3.3  /  TBili  0.4  /  DBili  x   /  AST  34  /  ALT  27  /  AlkPhos  53  08-16    LIVER FUNCTIONS - ( 16 Aug 2020 01:03 )  Alb: 3.3 g/dL / Pro: 5.7 g/dL / ALK PHOS: 53 U/L / ALT: 27 U/L / AST: 34 U/L / GGT: x             ABG - ( 15 Aug 2020 21:59 )  pH, Arterial: 7.42  pH, Blood: x     /  pCO2: 42    /  pO2: 113   / HCO3: 26    / Base Excess: 2.1   /  SaO2: 99        ======================Micro/Rad/Cardio=================  Culture: Reviewed   CXR: Reviewed  Echo:Reviewed  ======================================================  PAST MEDICAL & SURGICAL HISTORY:  H/O hemolytic anemia  H/O autoimmune hemolytic anemia  Knee pain, right  HLD (hyperlipidemia)  Former smoker  DVT of upper extremity (deep vein thrombosis)  Hepatitis C virus  GIB (gastrointestinal bleeding)  Ventricular fibrillation: s/p AICD  PAF (paroxysmal atrial fibrillation): on xarelto  Non-Ischemic Cardiomyopathy  SVT (Supraventricular Tachycardia)  HTN  CHF (Congestive Heart Failure)  S/P right heart catheterization: biopsy multiple  H/O heart transplant: 2/2018  Status post left hip replacement  History of Prior Ablation Treatment: for afib  AICD (Automatic Cardioverter/Defibrillator) Present: [employer] with 1 [employer] lead4/1/09- explanted and replaced with [employer]tr[employer] 2 leads on 9/2/09    ====================ASSESSMENT ==============  Heart transplant 2/2018 for ACC/AHA stage D NICM   Supraventricular tachycardia  severe hypertension  Klebsiella oxytoca   Sepsis  Resolving acute respiratory failure   Hyperlactatemia acidosis  Leukopenia      Plan:  ====================== NEUROLOGY=====================  Nonfocal, continue to monitor neuro status    ==================== RESPIRATORY======================  Resolving respiratory failure secondary to sepsis, requires investigation to sepsis, continue oxygen therapy via 5L nasal cannula    ====================CARDIOVASCULAR==================  Currently sinus tachycardic S/p IV Amiodarone for Supraventricular tachycardia   ASA/Statin for history of HLD and TCAD prophylaxis  AICD present  Tapering prednisone as per Heme  Blood pressure support with PO Hydralazine to discontinue IV Cardene    hydrALAZINE 50 milliGRAM(s) Oral every 8 hours  niCARdipine Infusion 5 mG/Hr (25 mL/Hr) IV Continuous <Continuous>  pravastatin 20 milliGRAM(s) Oral at bedtime  predniSONE   Tablet 40 milliGRAM(s) Oral daily    ===================HEMATOLOGIC/ONC ===================  Anemia- Possible GI bleed v.s hemolysis ?  s/p single blood transfusion   Monitor H&H     aspirin enteric coated 81 milliGRAM(s) Oral daily  VTE prophylaxis, enoxaparin Injectable 40 milliGRAM(s) SubCutaneous daily    ===================== RENAL =========================  Continue monitoring urine output, I&Os, BUN/Cr    ==================== GASTROINTESTINAL===================  GI following S/p EGD for investigation of tarry stools with no identifiable source  Per initial capsule study read, plan for endoscopy and push study tomorrow, NPO at midnight     cholecalciferol 1000 Unit(s) Oral daily  dextrose 5%. 1000 milliLiter(s) (50 mL/Hr) IV Continuous <Continuous>  folic acid 1 milliGRAM(s) Oral daily  magnesium oxide 400 milliGRAM(s) Oral daily  GI prophylaxis, pantoprazole  Injectable 40 milliGRAM(s) IV Push every 12 hours  sodium bicarbonate 650 milliGRAM(s) Oral two times a day  sodium chloride 0.9% lock flush 3 milliLiter(s) IV Push every 8 hours  sodium chloride 0.9%. 1000 milliLiter(s) (10 mL/Hr) IV Continuous <Continuous>    =======================    ENDOCRINE  =====================  Hyperglycemia requiring humalog sliding scale, LANTUS, and glucagon prn     dextrose 50% Injectable 12.5 Gram(s) IV Push once  glucagon  Injectable 1 milliGRAM(s) IntraMuscular once PRN Glucose LESS THAN 70 milligrams/deciliter  insulin glargine Injectable (LANTUS) 14 Unit(s) SubCutaneous at bedtime  insulin lispro (HumaLOG) corrective regimen sliding scale   SubCutaneous three times a day before meals  insulin lispro Injectable (HumaLOG) 5 Unit(s) SubCutaneous three times a day with meals    ========================INFECTIOUS DISEASE================  Bacteremia and sepsis requiring IV antibiotics--Klebsiella in blood, on Meropenem   WBC 2.8   Urine and blood cultures from 8/14--NGTD.   F/U BCx x2     meropenem  IVPB 1000 milliGRAM(s) IV Intermittent every 8 hours  posaconazole DR Tablet 300 milliGRAM(s) Oral daily  trimethoprim   80 mG/sulfamethoxazole 400 mG 1 Tablet(s) Oral daily  valGANciclovir 450 milliGRAM(s) Oral daily      Patient requires continuous monitoring with bedside rhythm monitoring, pulse ox monitoring, and intermittent blood gas analysis. Care plan discussed with ICU care team. Patient remained critical and required more than usual post op care.    By signing my name below, I, Breanna Newell, attest that this documentation has been prepared under the direction and in the presence of SHELLY Ennis   Electronically signed: Katty Ramírez, 08-16-20 @ 12:28    I, Elijah Landry , personally performed the services described in this documentation. all medical record entries made by the scribe were at my direction and in my presence. I have reviewed the chart and agree that the record reflects my personal performance and is accurate and complete  Electronically signed: SHELLY Ennis

## 2020-08-16 NOTE — PROGRESS NOTE ADULT - SUBJECTIVE AND OBJECTIVE BOX
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 Patient was treated w/ prednisone and Rituximab. Tacro was discontinued and patient was also transitioned to everolimus  Admitted  6/16-6/19  for hyperglycemia.  Reported to transplant team having one done black tarry stool-  instructed to  present to [facility] for hemolytic anemia. Patient has been following with Hematology outpatient.  Denies CP  N/V diarrhea SOB. (11 Aug 2020 20:08)      Surgery/Hospital course:  8/11 Admitted to [facility] with symptomatic anemia  8/13 EGD for investigation of tarry stools  8/15 SB capsule: stomach & SB lesions, likely ulcers.    Today/Overnight:  - Currently on IV abx, in bed on NC.  - No signs of active bleeding at this time, H/H 8/25, stable.  - Underwent capsule study, revealed stomach & upper SB lesions. NPO after midnight to return to EGD suite for push EGD with GI team. + Bowel sounds, slightly distended abdomen.   - Ambulating with no difficulties, will c/w supportive care at this time.    Vital Signs Last 24 Hrs  T(C): 36.7 (16 Aug 2020 16:00), Max: 36.7 (16 Aug 2020 12:00)  T(F): 98.1 (16 Aug 2020 16:00), Max: 98.1 (16 Aug 2020 16:00)  HR: 117 (16 Aug 2020 21:00) (117 - 134)  BP: --  BP(mean): --  RR: 12 (16 Aug 2020 21:00) (10 - 38)  SpO2: 100% (16 Aug 2020 21:00) (98% - 100%)  ============================I/O===========================  I&O's Detail    15 Aug 2020 07:01  -  16 Aug 2020 07:00  --------------------------------------------------------  IN:    IV PiggyBack: 150 mL    niCARdipine Infusion: 650 mL    Oral Fluid: 840 mL    sodium chloride 0.9%.: 240 mL  Total IN: 1880 mL    OUT:    Indwelling Catheter - Urethral: 290 mL    Voided: 450 mL  Total OUT: 740 mL    Total NET: 1140 mL      16 Aug 2020 07:01  -  16 Aug 2020 21:18  --------------------------------------------------------  IN:    IV PiggyBack: 100 mL    niCARdipine Infusion: 35 mL    Oral Fluid: 100 mL    sodium chloride 0.9%.: 140 mL  Total IN: 375 mL    OUT:    Voided: 850 mL  Total OUT: 850 mL    Total NET: -475 mL        ============================ LABS =========================                        8.1    2.84  )-----------( 138      ( 16 Aug 2020 01:03 )             25.7     08-16    141  |  106  |  27<H>  ----------------------------<  102<H>  4.7   |  24  |  1.40<H>    Ca    8.8      16 Aug 2020 01:03  Mg     2.6     08-16    TPro  5.7<L>  /  Alb  3.3  /  TBili  0.4  /  DBili  x   /  AST  34  /  ALT  27  /  AlkPhos  53  08-16    LIVER FUNCTIONS - ( 16 Aug 2020 01:03 )  Alb: 3.3 g/dL / Pro: 5.7 g/dL / ALK PHOS: 53 U/L / ALT: 27 U/L / AST: 34 U/L / GGT: x             ABG - ( 15 Aug 2020 21:59 )  pH, Arterial: 7.42  pH, Blood: x     /  pCO2: 42    /  pO2: 113   / HCO3: 26    / Base Excess: 2.1   /  SaO2: 99          ======================Micro/Rad/Cardio=================  Culture: Reviewed   CXR: Reviewed  Echo: Reviewed  ======================================================  PAST MEDICAL & SURGICAL HISTORY:  H/O hemolytic anemia  H/O autoimmune hemolytic anemia  Knee pain, right  HLD (hyperlipidemia)  Former smoker  DVT of upper extremity (deep vein thrombosis)  Hepatitis C virus  GIB (gastrointestinal bleeding)  Ventricular fibrillation: s/p AICD  PAF (paroxysmal atrial fibrillation): on xarelto  Non-Ischemic Cardiomyopathy  SVT (Supraventricular Tachycardia)  HTN  CHF (Congestive Heart Failure)  S/P right heart catheterization: biopsy multiple  H/O heart transplant: 2/2018  Status post left hip replacement  History of Prior Ablation Treatment: for afib  AICD (Automatic Cardioverter/Defibrillator) Present: [employer] with 1 [employer] lead4/1/09- explanted and replaced with [employer] 2 leads on 9/2/09    ========================ASSESSMENT ================  Heart transplant 2/2018 for ACC/AHA stage D NICM   Supraventricular tachycardia  severe hypertension  Klebsiella oxytoca   Sepsis  Resolving acute respiratory failure   Hyperlactatemia acidosis  Leukopenia    Plan:  ====================== NEUROLOGY=====================  Nonfocal, continue to monitor neuro status.    ==================== RESPIRATORY======================  Continue supplemental oxygen therapy via 5L/min NC.  - Continue to monitor RR, breathing pattern, pulse ox.    ====================CARDIOVASCULAR==================  AICD present  Currently sinus tachycardic S/P IV Amiodarone for SVT.  ASA / Statin for history of HLD and CAD prophylaxis  Blood pressure control with Hydralazine 50mg q8h to discontinue IV Cardene.  ===================HEMATOLOGIC/ONC ===================  Anemia 2/2 GI bleed S/P single blood transfusion   Monitor H&H closely with serial CBC's.     SQ Lovenox for venous thromboembolism prophylaxis.   enoxaparin Injectable 40 milliGRAM(s) SubCutaneous daily    ===================== RENAL =========================  Continue to monitor I/Os, BUN/Creatinine, and urine output.   Goal net even fluid balance. Replete lytes PRN. Keep K> 4 and Mg >2.     ==================== GASTROINTESTINAL===================  NPO after midnight for F/U with GI team tomorrow for push EGD study to investigate GI bleed.    Admitted for c/o Black tarry stools.  - Capsule study (8/15), revealed stomach & upper SB lesions.  - F/U with GI team tomorrow for push EGD study to investigate GI bleed.  - Continue Protonix at this time.    =======================    ENDOCRINE  =====================  Hyperglycemia requiring Humalog sliding scale, LANTUS, and glucagon prn     glucagon  Injectable 1 milliGRAM(s) IntraMuscular once PRN Glucose LESS THAN 70 milligrams/deciliter  insulin glargine Injectable (LANTUS) 14 Unit(s) SubCutaneous at bedtime  insulin lispro (HumaLOG) corrective regimen sliding scale   SubCutaneous three times a day before meals  insulin lispro Injectable (HumaLOG) 5 Unit(s) SubCutaneous three times a day with meals    ========================INFECTIOUS DISEASE================  Bacteremia and sepsis, requiring IV antibiotics--Klebsiella in blood, on Meropenem.  Urine and blood cultures from 8/14--NGTD.     meropenem  IVPB 1000 milliGRAM(s) IV Intermittent every 8 hours  posaconazole DR Tablet 300 milliGRAM(s) Oral daily  trimethoprim   80 mG/sulfamethoxazole 400 mG 1 Tablet(s) Oral daily  valGANciclovir 450 milliGRAM(s) Oral daily      Patient requires continuous monitoring with bedside rhythm monitoring, pulse oximetry monitoring, and continuous central venous and arterial pressure monitoring; and intermittent blood gas analysis.  Care plan discussed with ICU care team.    Patient remained critical, at risk for life threatening decompensation.   I have spent 35 minutes providing acute care with multiple re-evaluations throughout the evening.     By signing my name below, I, Jenni Fonseca, attest that this documentation has been prepared under the direction and in the presence of SHELLY Marks.  Electronically signed: Katty Llamas, 08-16-20 @ 21:18    I, SHELLY Marks, personally performed the services described in this documentation. All medical record entries made by the ramuibkurt were at my direction and in my presence. I have reviewed the chart and agree that the record reflects my personal performance and is accurate and complete  Electronically signed: SHELLY Marks, 08-16-20 @ 21:18

## 2020-08-16 NOTE — PROGRESS NOTE ADULT - PROBLEM SELECTOR PLAN 3
- No evidence of neutropenia, continue to monitor  - Repeat differential today - No evidence of neutropenia, continue to monitor  - Repeat differential today  - Check CMV PCR

## 2020-08-16 NOTE — PROGRESS NOTE ADULT - PROBLEM SELECTOR PLAN 2
-Responded appropriately to 1u PRBC but transfused again 2u PRBC 8/14 with Hgb slowly decreasing  - Awaiting repeat EGD/enteroscopy likely tomorrow due to VCE findings as above  -Appreciate heme recs, no signs of hemolysis at this time  -Start oral iron   -Tapering prednisone per hematology: 40 mg daily followed by 10 mg decrease every 10 days

## 2020-08-17 ENCOUNTER — RESULT REVIEW (OUTPATIENT)
Age: 70
End: 2020-08-17

## 2020-08-17 LAB
ANION GAP SERPL CALC-SCNC: 12 MMOL/L — SIGNIFICANT CHANGE UP (ref 5–17)
BASOPHILS # BLD AUTO: 0 K/UL — SIGNIFICANT CHANGE UP (ref 0–0.2)
BASOPHILS NFR BLD AUTO: 0 % — SIGNIFICANT CHANGE UP (ref 0–2)
BUN SERPL-MCNC: 26 MG/DL — HIGH (ref 7–23)
CALCIUM SERPL-MCNC: 8.5 MG/DL — SIGNIFICANT CHANGE UP (ref 8.4–10.5)
CHLORIDE SERPL-SCNC: 106 MMOL/L — SIGNIFICANT CHANGE UP (ref 96–108)
CO2 SERPL-SCNC: 23 MMOL/L — SIGNIFICANT CHANGE UP (ref 22–31)
CREAT SERPL-MCNC: 1.14 MG/DL — SIGNIFICANT CHANGE UP (ref 0.5–1.3)
EOSINOPHIL # BLD AUTO: 0.02 K/UL — SIGNIFICANT CHANGE UP (ref 0–0.5)
EOSINOPHIL NFR BLD AUTO: 0.9 % — SIGNIFICANT CHANGE UP (ref 0–6)
GLUCOSE BLDC GLUCOMTR-MCNC: 116 MG/DL — HIGH (ref 70–99)
GLUCOSE BLDC GLUCOMTR-MCNC: 118 MG/DL — HIGH (ref 70–99)
GLUCOSE BLDC GLUCOMTR-MCNC: 125 MG/DL — HIGH (ref 70–99)
GLUCOSE SERPL-MCNC: 104 MG/DL — HIGH (ref 70–99)
HCT VFR BLD CALC: 24.2 % — LOW (ref 39–50)
HCT VFR BLD CALC: 26.7 % — LOW (ref 39–50)
HGB BLD-MCNC: 7.6 G/DL — LOW (ref 13–17)
HGB BLD-MCNC: 8.3 G/DL — LOW (ref 13–17)
LYMPHOCYTES # BLD AUTO: 0.07 K/UL — LOW (ref 1–3.3)
LYMPHOCYTES # BLD AUTO: 3.5 % — LOW (ref 13–44)
MAGNESIUM SERPL-MCNC: 2.4 MG/DL — SIGNIFICANT CHANGE UP (ref 1.6–2.6)
MANUAL SMEAR VERIFICATION: SIGNIFICANT CHANGE UP
MCHC RBC-ENTMCNC: 30.9 PG — SIGNIFICANT CHANGE UP (ref 27–34)
MCHC RBC-ENTMCNC: 31.1 GM/DL — LOW (ref 32–36)
MCHC RBC-ENTMCNC: 31.4 GM/DL — LOW (ref 32–36)
MCHC RBC-ENTMCNC: 31.4 PG — SIGNIFICANT CHANGE UP (ref 27–34)
MCV RBC AUTO: 100 FL — SIGNIFICANT CHANGE UP (ref 80–100)
MCV RBC AUTO: 99.3 FL — SIGNIFICANT CHANGE UP (ref 80–100)
METAMYELOCYTES # FLD: 1.7 % — HIGH (ref 0–0)
MONOCYTES # BLD AUTO: 0.05 K/UL — SIGNIFICANT CHANGE UP (ref 0–0.9)
MONOCYTES NFR BLD AUTO: 2.6 % — SIGNIFICANT CHANGE UP (ref 2–14)
MYELOCYTES NFR BLD: 2.6 % — HIGH (ref 0–0)
NEUTROPHILS # BLD AUTO: 1.84 K/UL — SIGNIFICANT CHANGE UP (ref 1.8–7.4)
NEUTROPHILS NFR BLD AUTO: 80.9 % — HIGH (ref 43–77)
NEUTS BAND # BLD: 7.8 % — SIGNIFICANT CHANGE UP (ref 0–8)
NRBC # BLD: 0 /100 WBCS — SIGNIFICANT CHANGE UP (ref 0–0)
PLAT MORPH BLD: NORMAL — SIGNIFICANT CHANGE UP
PLATELET # BLD AUTO: 132 K/UL — LOW (ref 150–400)
PLATELET # BLD AUTO: 146 K/UL — LOW (ref 150–400)
POTASSIUM SERPL-MCNC: 4.3 MMOL/L — SIGNIFICANT CHANGE UP (ref 3.5–5.3)
POTASSIUM SERPL-SCNC: 4.3 MMOL/L — SIGNIFICANT CHANGE UP (ref 3.5–5.3)
RBC # BLD: 2.42 M/UL — LOW (ref 4.2–5.8)
RBC # BLD: 2.69 M/UL — LOW (ref 4.2–5.8)
RBC # FLD: 19.3 % — HIGH (ref 10.3–14.5)
RBC # FLD: 19.8 % — HIGH (ref 10.3–14.5)
RBC BLD AUTO: SIGNIFICANT CHANGE UP
SODIUM SERPL-SCNC: 141 MMOL/L — SIGNIFICANT CHANGE UP (ref 135–145)
WBC # BLD: 1.81 K/UL — LOW (ref 3.8–10.5)
WBC # BLD: 2.08 K/UL — LOW (ref 3.8–10.5)
WBC # FLD AUTO: 1.81 K/UL — LOW (ref 3.8–10.5)
WBC # FLD AUTO: 2.08 K/UL — LOW (ref 3.8–10.5)

## 2020-08-17 PROCEDURE — 88342 IMHCHEM/IMCYTCHM 1ST ANTB: CPT | Mod: 26,59

## 2020-08-17 PROCEDURE — 71045 X-RAY EXAM CHEST 1 VIEW: CPT | Mod: 26

## 2020-08-17 PROCEDURE — 99232 SBSQ HOSP IP/OBS MODERATE 35: CPT | Mod: GC

## 2020-08-17 PROCEDURE — 88312 SPECIAL STAINS GROUP 1: CPT | Mod: 26

## 2020-08-17 PROCEDURE — 99232 SBSQ HOSP IP/OBS MODERATE 35: CPT

## 2020-08-17 PROCEDURE — 99291 CRITICAL CARE FIRST HOUR: CPT

## 2020-08-17 PROCEDURE — 43239 EGD BIOPSY SINGLE/MULTIPLE: CPT

## 2020-08-17 PROCEDURE — 88305 TISSUE EXAM BY PATHOLOGIST: CPT | Mod: 26

## 2020-08-17 RX ORDER — HYDRALAZINE HCL 50 MG
10 TABLET ORAL ONCE
Refills: 0 | Status: COMPLETED | OUTPATIENT
Start: 2020-08-17 | End: 2020-08-17

## 2020-08-17 RX ORDER — CEFEPIME 1 G/1
1000 INJECTION, POWDER, FOR SOLUTION INTRAMUSCULAR; INTRAVENOUS EVERY 8 HOURS
Refills: 0 | Status: DISCONTINUED | OUTPATIENT
Start: 2020-08-17 | End: 2020-08-20

## 2020-08-17 RX ADMIN — PANTOPRAZOLE SODIUM 40 MILLIGRAM(S): 20 TABLET, DELAYED RELEASE ORAL at 05:30

## 2020-08-17 RX ADMIN — SODIUM CHLORIDE 3 MILLILITER(S): 9 INJECTION INTRAMUSCULAR; INTRAVENOUS; SUBCUTANEOUS at 21:34

## 2020-08-17 RX ADMIN — MEROPENEM 100 MILLIGRAM(S): 1 INJECTION INTRAVENOUS at 05:31

## 2020-08-17 RX ADMIN — Medication 50 MILLIGRAM(S): at 21:34

## 2020-08-17 RX ADMIN — Medication 650 MILLIGRAM(S): at 17:16

## 2020-08-17 RX ADMIN — SODIUM CHLORIDE 3 MILLILITER(S): 9 INJECTION INTRAMUSCULAR; INTRAVENOUS; SUBCUTANEOUS at 15:37

## 2020-08-17 RX ADMIN — Medication 50 MILLIGRAM(S): at 05:31

## 2020-08-17 RX ADMIN — Medication 40 MILLIGRAM(S): at 05:31

## 2020-08-17 RX ADMIN — MAGNESIUM OXIDE 400 MG ORAL TABLET 400 MILLIGRAM(S): 241.3 TABLET ORAL at 15:37

## 2020-08-17 RX ADMIN — CEFEPIME 100 MILLIGRAM(S): 1 INJECTION, POWDER, FOR SOLUTION INTRAMUSCULAR; INTRAVENOUS at 15:42

## 2020-08-17 RX ADMIN — CEFEPIME 100 MILLIGRAM(S): 1 INJECTION, POWDER, FOR SOLUTION INTRAMUSCULAR; INTRAVENOUS at 21:33

## 2020-08-17 RX ADMIN — ENOXAPARIN SODIUM 40 MILLIGRAM(S): 100 INJECTION SUBCUTANEOUS at 12:09

## 2020-08-17 RX ADMIN — Medication 1 MILLIGRAM(S): at 15:35

## 2020-08-17 RX ADMIN — POSACONAZOLE 300 MILLIGRAM(S): 100 TABLET, DELAYED RELEASE ORAL at 15:35

## 2020-08-17 RX ADMIN — Medication 10 MILLIGRAM(S): at 03:01

## 2020-08-17 RX ADMIN — Medication 5 UNIT(S): at 17:18

## 2020-08-17 RX ADMIN — Medication 2 GRAM(S): at 17:14

## 2020-08-17 RX ADMIN — PANTOPRAZOLE SODIUM 40 MILLIGRAM(S): 20 TABLET, DELAYED RELEASE ORAL at 17:14

## 2020-08-17 RX ADMIN — SODIUM CHLORIDE 3 MILLILITER(S): 9 INJECTION INTRAMUSCULAR; INTRAVENOUS; SUBCUTANEOUS at 05:32

## 2020-08-17 RX ADMIN — Medication 2 GRAM(S): at 05:31

## 2020-08-17 RX ADMIN — Medication 1000 UNIT(S): at 15:42

## 2020-08-17 RX ADMIN — INSULIN GLARGINE 14 UNIT(S): 100 INJECTION, SOLUTION SUBCUTANEOUS at 21:33

## 2020-08-17 RX ADMIN — SODIUM CHLORIDE 10 MILLILITER(S): 9 INJECTION INTRAMUSCULAR; INTRAVENOUS; SUBCUTANEOUS at 20:09

## 2020-08-17 RX ADMIN — EVEROLIMUS 0.75 MILLIGRAM(S): 10 TABLET ORAL at 20:08

## 2020-08-17 RX ADMIN — Medication 81 MILLIGRAM(S): at 15:40

## 2020-08-17 RX ADMIN — EVEROLIMUS 0.75 MILLIGRAM(S): 10 TABLET ORAL at 07:55

## 2020-08-17 RX ADMIN — SODIUM CHLORIDE 10 MILLILITER(S): 9 INJECTION INTRAMUSCULAR; INTRAVENOUS; SUBCUTANEOUS at 05:31

## 2020-08-17 RX ADMIN — Medication 20 MILLIGRAM(S): at 21:33

## 2020-08-17 RX ADMIN — Medication 650 MILLIGRAM(S): at 05:31

## 2020-08-17 RX ADMIN — Medication 50 MILLIGRAM(S): at 15:38

## 2020-08-17 NOTE — PROGRESS NOTE ADULT - PROBLEM SELECTOR PLAN 5
- Klebsiella bacteremia from 8/13 which have since cleared. CT with ? pneumonia and urine culture with > 3 organisms concerning for contaminant.   - ID recs appreciated  - Continue meropenem pending sensitivities. - Klebsiella bacteremia from 8/13 which have since cleared. CT with ? pneumonia and urine culture with > 3 organisms concerning for contaminant.   - ID recs appreciated  - Continue meropenem; d/w ID whether this can be narrowed based on sensitivities

## 2020-08-17 NOTE — PROGRESS NOTE ADULT - ASSESSMENT
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 He has responded appropriately to a single blood transfusion. Of note, he recently has developed dark colored stools. Will investigate if anemia is due to GI bleeding in setting of steroid use or hemolysis and manage appropriately. Underwent UGI without bleeding source identified  Developed gram negative sepsis requiring transfer to CTU  Clinically improving      Sepsis:  Klebsiella oxytoca growing in blood cultures 2/2 sets  Unclear source  Repeat blood cultures x2 sets 8/14 were negative  Klebsiella- sensitive to Cefepime  change Meropenem to Cefepime    Anemia- r/o GI bleeding    UGI non revealing  underwent a second procedure earlier today with results pending        Pancytopenia- on admission  Remains with significant leukopenia- especially lymphocyte lines  check CMV viral load   repeat fungitell and galactomannan pending this admission  Would also consider repeat COVID swab  check tick borne panel  would repeat chest CT scan to see status of prior RUL scarring vs. nodule once patient is stable        Fungitell-  moderately elevated in May and non detectable on more recent June admission  repeat ordered  continue Posaconazole  repeat chest CT non contrast    OI prophylaxis-  has been receiving high dose steroids since 5/20  restarted on valganciclovir  restarted on bactrim  CMV viral load to check as above    Discussed with CTU team    Gary Lujan MD  [phone]  After 5pm/weekends [phone]

## 2020-08-17 NOTE — PROGRESS NOTE ADULT - SUBJECTIVE AND OBJECTIVE BOX
Patient seen and examined at bedside.    Overnight Events:     Review Of Systems: No chest pain, shortness of breath, or palpitations            Current Meds:  aspirin enteric coated 81 milliGRAM(s) Oral daily  cholecalciferol 1000 Unit(s) Oral daily  dextrose 5%. 1000 milliLiter(s) IV Continuous <Continuous>  dextrose 50% Injectable 12.5 Gram(s) IV Push once  enoxaparin Injectable 40 milliGRAM(s) SubCutaneous daily  everolimus (ZORTRESS) 0.75 milliGRAM(s) Oral <User Schedule>  folic acid 1 milliGRAM(s) Oral daily  glucagon  Injectable 1 milliGRAM(s) IntraMuscular once PRN  hydrALAZINE 50 milliGRAM(s) Oral every 8 hours  insulin glargine Injectable (LANTUS) 14 Unit(s) SubCutaneous at bedtime  insulin lispro (HumaLOG) corrective regimen sliding scale   SubCutaneous three times a day before meals  insulin lispro Injectable (HumaLOG) 5 Unit(s) SubCutaneous three times a day with meals  magnesium oxide 400 milliGRAM(s) Oral daily  meropenem  IVPB 1000 milliGRAM(s) IV Intermittent every 8 hours  omega-3-Acid Ethyl Esters 2 Gram(s) Oral two times a day  pantoprazole  Injectable 40 milliGRAM(s) IV Push every 12 hours  posaconazole DR Tablet 300 milliGRAM(s) Oral daily  pravastatin 20 milliGRAM(s) Oral at bedtime  predniSONE   Tablet 40 milliGRAM(s) Oral daily  sodium bicarbonate 650 milliGRAM(s) Oral two times a day  sodium chloride 0.9% lock flush 3 milliLiter(s) IV Push every 8 hours  sodium chloride 0.9%. 1000 milliLiter(s) IV Continuous <Continuous>  trimethoprim   80 mG/sulfamethoxazole 400 mG 1 Tablet(s) Oral daily  valGANciclovir 450 milliGRAM(s) Oral daily      Vitals:  T(F): 98.4 (08-17), Max: 98.4 (08-17)  HR: 105 (08-17) (105 - 134)  BP: --  RR: 12 (08-17)  SpO2: 100% (08-17)  I&O's Summary    16 Aug 2020 07:01  -  17 Aug 2020 07:00  --------------------------------------------------------  IN: 645 mL / OUT: 1575 mL / NET: -930 mL    17 Aug 2020 07:01  -  17 Aug 2020 10:55  --------------------------------------------------------  IN: 30 mL / OUT: 400 mL / NET: -370 mL        Physical Exam:  Gen: NAD.  HEENT: NCAT. PERRLA b/l.  Neck: No JVP elev.  CV: Normal S1, S2. RRR. No MRG.  Chest: CTAB. No WRR.  Abd: +BSx4. Soft. NTND.  Ext: No LE edema.  Skin: No cyanosis.                          7.6    1.81  )-----------( 132      ( 17 Aug 2020 00:45 )             24.2     08-17    141  |  106  |  26<H>  ----------------------------<  104<H>  4.3   |  23  |  1.14    Ca    8.5      17 Aug 2020 00:45  Mg     2.4     08-17    TPro  5.7<L>  /  Alb  3.3  /  TBili  0.4  /  DBili  x   /  AST  34  /  ALT  27  /  AlkPhos  53  08-16      CARDIAC MARKERS ( 13 Aug 2020 23:51 )  36 ng/L / x     / x     / 61 U/L / x     / 2.7 ng/mL  CARDIAC MARKERS ( 13 Aug 2020 22:53 )  25 ng/L / x     / x     / 78 U/L / x     / 3.0 ng/mL      Serum Pro-Brain Natriuretic Peptide: 736 pg/mL (08-11 @ 22:28)          New ECG(s): Personally reviewed    Echo:    Stress Testing:     Cath:    Imaging:    Interpretation of Telemetry: Patient seen and examined at bedside.    Overnight Events: NAEO. This AM, pt denies any complaints. He is hungry as he is awaiting EGD now. Denies any further melena/hematochezia.    Review Of Systems: No chest pain, shortness of breath, or palpitations            Current Meds:  aspirin enteric coated 81 milliGRAM(s) Oral daily  cholecalciferol 1000 Unit(s) Oral daily  dextrose 5%. 1000 milliLiter(s) IV Continuous <Continuous>  dextrose 50% Injectable 12.5 Gram(s) IV Push once  enoxaparin Injectable 40 milliGRAM(s) SubCutaneous daily  everolimus (ZORTRESS) 0.75 milliGRAM(s) Oral <User Schedule>  folic acid 1 milliGRAM(s) Oral daily  glucagon  Injectable 1 milliGRAM(s) IntraMuscular once PRN  hydrALAZINE 50 milliGRAM(s) Oral every 8 hours  insulin glargine Injectable (LANTUS) 14 Unit(s) SubCutaneous at bedtime  insulin lispro (HumaLOG) corrective regimen sliding scale   SubCutaneous three times a day before meals  insulin lispro Injectable (HumaLOG) 5 Unit(s) SubCutaneous three times a day with meals  magnesium oxide 400 milliGRAM(s) Oral daily  meropenem  IVPB 1000 milliGRAM(s) IV Intermittent every 8 hours  omega-3-Acid Ethyl Esters 2 Gram(s) Oral two times a day  pantoprazole  Injectable 40 milliGRAM(s) IV Push every 12 hours  posaconazole DR Tablet 300 milliGRAM(s) Oral daily  pravastatin 20 milliGRAM(s) Oral at bedtime  predniSONE   Tablet 40 milliGRAM(s) Oral daily  sodium bicarbonate 650 milliGRAM(s) Oral two times a day  sodium chloride 0.9% lock flush 3 milliLiter(s) IV Push every 8 hours  sodium chloride 0.9%. 1000 milliLiter(s) IV Continuous <Continuous>  trimethoprim   80 mG/sulfamethoxazole 400 mG 1 Tablet(s) Oral daily  valGANciclovir 450 milliGRAM(s) Oral daily      Vitals:  T(F): 98.4 (08-17), Max: 98.4 (08-17)  HR: 105 (08-17) (105 - 134)  BP: --  RR: 12 (08-17)  SpO2: 100% (08-17)  I&O's Summary    16 Aug 2020 07:01  -  17 Aug 2020 07:00  --------------------------------------------------------  IN: 645 mL / OUT: 1575 mL / NET: -930 mL    17 Aug 2020 07:01  -  17 Aug 2020 10:55  --------------------------------------------------------  IN: 30 mL / OUT: 400 mL / NET: -370 mL        Physical Exam:  Gen: NAD.  HEENT: NCAT.  Neck: No JVP elev.  CV: Normal S1, S2. RRR. Continuous murmur.  Chest: CTAB. No WRR.  Abd: +BSx4. Soft. NTND.  Ext: No LE edema. L fem CVC.  Skin: No cyanosis.                          7.6    1.81  )-----------( 132      ( 17 Aug 2020 00:45 )             24.2     08-17    141  |  106  |  26<H>  ----------------------------<  104<H>  4.3   |  23  |  1.14    Ca    8.5      17 Aug 2020 00:45  Mg     2.4     08-17    TPro  5.7<L>  /  Alb  3.3  /  TBili  0.4  /  DBili  x   /  AST  34  /  ALT  27  /  AlkPhos  53  08-16      CARDIAC MARKERS ( 13 Aug 2020 23:51 )  36 ng/L / x     / x     / 61 U/L / x     / 2.7 ng/mL  CARDIAC MARKERS ( 13 Aug 2020 22:53 )  25 ng/L / x     / x     / 78 U/L / x     / 3.0 ng/mL      Serum Pro-Brain Natriuretic Peptide: 736 pg/mL (08-11 @ 22:28)

## 2020-08-17 NOTE — DIETITIAN INITIAL EVALUATION ADULT. - ADD RECOMMEND
1. As feasible advance diet to Low Na and consistent CHO diet. 2. Continue to encourage adequate BG levels 3. reinforce Consistent CHO diet education as needed 4. Encourage adequate PO intake

## 2020-08-17 NOTE — PROGRESS NOTE ADULT - PROBLEM SELECTOR PLAN 3
-Await repeat diff to eval for neutropenia  -C/w meropenem, as below  -Hold Valcyte and Bactrim SS, as above  -Await CMV PCR  -Have asked GI to biospy GI tract during study today -Await repeat diff to eval for neutropenia  -C/w meropenem, as below  -Hold Valcyte and Bactrim SS, as above  -Await CMV PCR  -Have asked GI to biospy GI tract during study today  -Await diff for neutropenia; if neutropenic favor Neupogen

## 2020-08-17 NOTE — DIETITIAN INITIAL EVALUATION ADULT. - PERTINENT LABORATORY DATA
08-17 @ 00:45: Sodium 141, Potassium 4.3, Chloride 106, Calcium 8.5, Magnesium 2.4, Phosphorus --, BUN 26<H>, Creatinine 1.14, <H>, Alk Phos --, ALT/SGPT --, AST/SGOT --, HbA1c --, Total Protein --, Albumin --, Prealbumin --, Total Bilirubin --, Direct Bilirubin --, Hemoglobin 7.6<L>, Hematocrit 24.2<L>  BG levels: 8/17: 138-207, 8/16: 110-208

## 2020-08-17 NOTE — PROGRESS NOTE ADULT - ASSESSMENT
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   -Currently no evidence of hemolysis although Jacky IgG positive, C3 negative   -presented with black stool for past few days  -Indirect bilirubin normal   -8/10 labs outpatient showing LDH elevated but stable, Tbili 0.7, retic mildly increased to 4.6%   -most likely anemia related to GI bleed, blood loss given labs and history   -GI consulted s/p endoscopy not revealing of bleed but per GI note: formal capsule report pending but possible gastric ulcer, duodenal angioectasisas/erosions, and 1 small bowel angioectasia seen, no active bleeding.   -Repeat endoscopy not revealing of ulcers or erosions seen on capsule but did have esophagitis, bx taken and sent for viral studies (CMV, etc) given immunocompromised status   -continue with IV protonix per GI   -continue to taper prednisone by 10mg every 10 days   -trend hemolysis labs   -continue folic acid supplementation    #Klebsiella Bacteremia   -continue abx per primary team     Johnnie Whitten MD   Hematology/Oncology Fellow   [phone]  Please page on call fellow after 5pm and on weekends

## 2020-08-17 NOTE — H&P ADULT. - GIT GEN HX ROS MEA POS PC
Patient presents with daughter via private auto from [facility] for \"emesis x4, rhonchi and wheezing in lungs, pulse ox 76% on room air\" per paper work that was sent with patient.    Patient states she developed shortness of breath and cough today and just \"doesn't feel good\"   nausea/vomiting

## 2020-08-17 NOTE — PROGRESS NOTE ADULT - SUBJECTIVE AND OBJECTIVE BOX
INFECTIOUS DISEASES FOLLOW UP-- Sujey Lujan  [phone]    This is a follow up note for this  70yMale with  Anemia      ROS:  CONSTITUTIONAL:  No fever, good appetite  CARDIOVASCULAR:  No chest pain or palpitations  RESPIRATORY:  No dyspnea  GASTROINTESTINAL:  No nausea, vomiting, diarrhea, or abdominal pain  GENITOURINARY:  No dysuria  NEUROLOGIC:  No headache,     Allergies    No Known Allergies    Intolerances        ANTIBIOTICS/RELEVANT:  antimicrobials  cefepime   IVPB 1000 milliGRAM(s) IV Intermittent every 8 hours  posaconazole DR Tablet 300 milliGRAM(s) Oral daily    immunologic:  everolimus (ZORTRESS) 0.75 milliGRAM(s) Oral <User Schedule>    OTHER:  aspirin enteric coated 81 milliGRAM(s) Oral daily  cholecalciferol 1000 Unit(s) Oral daily  dextrose 5%. 1000 milliLiter(s) IV Continuous <Continuous>  dextrose 50% Injectable 12.5 Gram(s) IV Push once  enoxaparin Injectable 40 milliGRAM(s) SubCutaneous daily  folic acid 1 milliGRAM(s) Oral daily  glucagon  Injectable 1 milliGRAM(s) IntraMuscular once PRN  hydrALAZINE 50 milliGRAM(s) Oral every 8 hours  insulin glargine Injectable (LANTUS) 14 Unit(s) SubCutaneous at bedtime  insulin lispro (HumaLOG) corrective regimen sliding scale   SubCutaneous three times a day before meals  insulin lispro Injectable (HumaLOG) 5 Unit(s) SubCutaneous three times a day with meals  magnesium oxide 400 milliGRAM(s) Oral daily  omega-3-Acid Ethyl Esters 2 Gram(s) Oral two times a day  pantoprazole  Injectable 40 milliGRAM(s) IV Push every 12 hours  pravastatin 20 milliGRAM(s) Oral at bedtime  predniSONE   Tablet 40 milliGRAM(s) Oral daily  sodium bicarbonate 650 milliGRAM(s) Oral two times a day  sodium chloride 0.9% lock flush 3 milliLiter(s) IV Push every 8 hours  sodium chloride 0.9%. 1000 milliLiter(s) IV Continuous <Continuous>      Objective:  Vital Signs Last 24 Hrs  T(C): 36.8 (17 Aug 2020 17:00), Max: 36.9 (17 Aug 2020 08:00)  T(F): 98.2 (17 Aug 2020 17:00), Max: 98.4 (17 Aug 2020 08:00)  HR: 121 (17 Aug 2020 19:00) (103 - 123)  BP: --  BP(mean): --  RR: 23 (17 Aug 2020 19:00) (10 - 38)  SpO2: 98% (17 Aug 2020 19:00) (71% - 100%)    PHYSICAL EXAM:  Constitutional:no acute distress  Eyes:WALT, EOMI  Ear/Nose/Throat: no oral lesions, 	  Respiratory: clear BL sternotomy healed  Cardiovascular: S1S2  Gastrointestinal:soft, (+) BS, no tenderness  Extremities:no e/e/c  No Lymphadenopathy  IV sites not inflammed.    LABS:                        8.3    2.08  )-----------( 146      ( 17 Aug 2020 12:50 )             26.7     08-17    141  |  106  |  26<H>  ----------------------------<  104<H>  4.3   |  23  |  1.14    Ca    8.5      17 Aug 2020 00:45  Mg     2.4     08-17    TPro  5.7<L>  /  Alb  3.3  /  TBili  0.4  /  DBili  x   /  AST  34  /  ALT  27  /  AlkPhos  53  08-16          MICROBIOLOGY:    Culture - Blood (08.14.20 @ 01:32)    Specimen Source: .Blood Blood    Culture Results:   No growth to date.            RECENT CULTURES:  08-14 @ 13:32  .Urine Clean Catch (Midstream)  --  --  --    No growth  --  08-14 @ 01:32  .Blood Blood  --  --  --    No growth to date.  --  08-13 @ 14:48  .Blood Blood-Peripheral  --  --  --    Growth in aerobic and anaerobic bottles: Klebsiella oxytoca/Raoutella  ornithinolytica  See previous culture [id number]  --  08-13 @ 12:14  .Blood Blood-Peripheral  Blood Culture PCR  Klebsiella oxytoca /Raoutella ornithinolytica  Blood Culture PCR  PCR    Growth in anaerobic bottle: Klebsiella oxytoca/Raoutella ornithinolytica  "Due to technical problems, Proteus sp. will Not be reported as part of  the BCID panel until further notice"  ***Blood Panel PCR results on this specimen are available  approximately 3 hours after the Gram stain result.***  Gram stain, PCR, and/or culture results may not always  correspond due to difference in methodologies.  ************************************************************  This PCR assay was performed using[employer].  The following targets are tested for: Enterococcus,  vancomycin resistant enterococci, Listeria monocytogenes,  coagulase negative staphylococci, S. aureus,  methicillin resistant S. aureus, Streptococcus agalactiae  (Group B), S.pneumoniae, S. pyogenes (Group A),  Acinetobacter baumannii, Enterobacter cloacae, E. coli,  Klebsiella oxytoca, K. pneumoniae, Proteus sp.,  Serratia marcescens, Haemophilus influenzae,  Neisseria meningitidis, Pseudomonas aeruginosa, Candida  albicans, C. glabrata, C krusei, C parapsilosis,  C. tropicalis and the KPC resistance gene.  --  08-13 @ 00:40  .Urine Clean Catch (Midstream)  --  --  --    >=3 organisms. Probable collection contamination.  --      RADIOLOGY & ADDITIONAL STUDIES:    < from: Xray Chest 1 View- PORTABLE-Routine (08.17.20 @ 02:42) >  FINDINGS/  impression:  The size of the cardiomediastinal silhouette cannot be accurately assessed in this single projection.  There are no focal pulmonary consolidations.    Elevated right hemidiaphragm. Small right pleural effusion and right basilar atelectasis. Hazy opacities throughout the left lung.    < end of copied text >

## 2020-08-17 NOTE — DIETITIAN INITIAL EVALUATION ADULT. - ENERGY NEEDS
Ht: 5'7", Wt: 165lbs, BMI:, 26.0kg/m2, IBW: 148lbs(+/-10%), 111%IBW  Pertinent information: 70 year old male with PMHx of NICM, s/p HM2 LVAD and OHT in 2/2018, with known coronary fistula. Recent admission for hemolytic anemia, now admitted for hyperglycemia and GIB. EGD and capsule study negative, plan for repeat EGD today.    +1 tara hand and wrist Edema, Skin: intact

## 2020-08-17 NOTE — PROGRESS NOTE ADULT - ATTENDING COMMENTS
70 yrs, Hep C s/p treatment.   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 biopsy injury).   On posiconazole for positive fungitel since may   History of Jacky positive hemolyitic anemia diagnosed a number months ago. s/p ritoxan X1 and high dose prednisone. recently his hemolyitic markers have been normal. prograf changed to evero.   Patient has been on Valcyte, Bactrim since May for high dose pred.   Admitted Aug 11th with melantic stools and Hb 6.4 with normal hapto, bili and LDH.   EGD push negative. video capsule gastric ulcer. Awaiting repeat EGD push today.   Found to be leukopenic. 2X BC klebsiella. Decom after EGD. BiPAP moved CTU.   Over weekend. CT pan scan showed: patchy opacities left upper and lower lobes concerning for pneumonia.    Repeat BC negative. CMV PCR pending.   meds: evero .75 bid (8.4), pred 40 slow taper, Bicabr, Prava, PPI, lovaza, Mg, HDZN 50 Q8, posi, valcyte 450 QD, Bactrim SS QD.  dayne 1g Q8.   no fevers over weekend. 2 X PRBC over weekend.   145/70, 100, Afebrile, CVP 6-8  I/O: -940   08-17  141  |  106  |  26<H>  ----------------------------<  104<H>  4.3   |  23  |  1.14  Ca    8.5      17 Aug 2020 00:45  Mg     2.4     08-17  TPro  5.7<L>  /  Alb  3.3  /  TBili  0.4  /  DBili  x   /  AST  34  /  ALT  27  /  AlkPhos  53  08-16                        7.6    1.81  )-----------( 132      ( 17 Aug 2020 00:45 )             24.2   WBC 1.8, 2.8, 3.7, 6.0 (admission 2.9)   Awaiting repeat EGD. Have asked GI for gastric biopsy to r/o CMV.   Continue meropenem.   Will d/c bactrim and valcyte pending CMV PCR.   Calculate diff if neutropenic for neupogen.   Will discuss with Dr Lujan.   Marvin Campbell

## 2020-08-17 NOTE — PROGRESS NOTE ADULT - SUBJECTIVE AND OBJECTIVE BOX
INTERVAL HPI/OVERNIGHT EVENTS:  Patient S&E at bedside. No o/n events,     VITAL SIGNS:  T(F): 98.1 (08-17-20 @ 12:00)  HR: 110 (08-17-20 @ 14:29)  BP: --  RR: 30 (08-17-20 @ 14:29)  SpO2: 100% (08-17-20 @ 14:29)  Wt(kg): --    PHYSICAL EXAM:    Constitutional: NAD  Eyes: EOMI, sclera non-icteric  Neck: supple  Respiratory: CTAB, no wheezes or crackles   Cardiovascular: RRR  Gastrointestinal: soft, NTND, + BS  Extremities: no cyanosis, clubbing or edema   Neurological: awake and alert      MEDICATIONS  (STANDING):  aspirin enteric coated 81 milliGRAM(s) Oral daily  cefepime   IVPB 1000 milliGRAM(s) IV Intermittent every 8 hours  cholecalciferol 1000 Unit(s) Oral daily  dextrose 5%. 1000 milliLiter(s) (50 mL/Hr) IV Continuous <Continuous>  dextrose 50% Injectable 12.5 Gram(s) IV Push once  enoxaparin Injectable 40 milliGRAM(s) SubCutaneous daily  everolimus (ZORTRESS) 0.75 milliGRAM(s) Oral <User Schedule>  folic acid 1 milliGRAM(s) Oral daily  hydrALAZINE 50 milliGRAM(s) Oral every 8 hours  insulin glargine Injectable (LANTUS) 14 Unit(s) SubCutaneous at bedtime  insulin lispro (HumaLOG) corrective regimen sliding scale   SubCutaneous three times a day before meals  insulin lispro Injectable (HumaLOG) 5 Unit(s) SubCutaneous three times a day with meals  magnesium oxide 400 milliGRAM(s) Oral daily  omega-3-Acid Ethyl Esters 2 Gram(s) Oral two times a day  pantoprazole  Injectable 40 milliGRAM(s) IV Push every 12 hours  posaconazole DR Tablet 300 milliGRAM(s) Oral daily  pravastatin 20 milliGRAM(s) Oral at bedtime  predniSONE   Tablet 40 milliGRAM(s) Oral daily  sodium bicarbonate 650 milliGRAM(s) Oral two times a day  sodium chloride 0.9% lock flush 3 milliLiter(s) IV Push every 8 hours  sodium chloride 0.9%. 1000 milliLiter(s) (10 mL/Hr) IV Continuous <Continuous>    MEDICATIONS  (PRN):  glucagon  Injectable 1 milliGRAM(s) IntraMuscular once PRN Glucose LESS THAN 70 milligrams/deciliter      Allergies    No Known Allergies    Intolerances        LABS:                        8.3    2.08  )-----------( 146      ( 17 Aug 2020 12:50 )             26.7     08-17    141  |  106  |  26<H>  ----------------------------<  104<H>  4.3   |  23  |  1.14    Ca    8.5      17 Aug 2020 00:45  Mg     2.4     08-17    TPro  5.7<L>  /  Alb  3.3  /  TBili  0.4  /  DBili  x   /  AST  34  /  ALT  27  /  AlkPhos  53  08-16          RADIOLOGY & ADDITIONAL TESTS:  Studies reviewed.

## 2020-08-17 NOTE — PROGRESS NOTE ADULT - PROBLEM SELECTOR PLAN 6
- discontinue nicardipine and continue hydralizine 25 TID   - will switch hydralizine to amlodipine when off nicardipine -Off meds now; c/w monitor

## 2020-08-17 NOTE — PROGRESS NOTE ADULT - PROBLEM SELECTOR PLAN 2
-S/p 3units pRBC this admission thus far  -Awaitrepeat EGD/enteroscopy today due to VCE findings as above  -Appreciate heme recs, no signs of hemolysis at this time  -Favor more aggressive taper schedule, as above

## 2020-08-17 NOTE — PROGRESS NOTE ADULT - SUBJECTIVE AND OBJECTIVE BOX
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 Patient was treated w/ prednisone and Rituximab. Tacro was discontinued and patient was also transitioned to everolimus  Admitted  6/16-6/19  for hyperglycemia.  Reported to transplant team having one done black tarry stool-  instructed to  present to [facility] for hemolytic anemia. Patient has been following with Hematology outpatient.  Denies CP  N/V diarrhea SOB. (11 Aug 2020 20:08)      Surgery/Hospital Course:  8/11 Admitted to [facility] with symptomatic anemia  8/13 EGD for investigation of tarry stools  8/15 SB capsule: stomach & SB lesions, likely ulcers.    Today:    REVIEW OF SYSTEMS:  Gen: No fever  EYES/ENT: No visual changes;  No vertigo or throat pain   NECK: No pain   RES:  No shortness of breath or Cough  CARD: No chest pain   GI: No abdominal pain  : No dysuria  NEURO: No weakness  SKIN: No itching, rashes     ICU Vital Signs Last 24 Hrs  T(C): 36.9 (17 Aug 2020 08:00), Max: 36.9 (17 Aug 2020 08:00)  T(F): 98.4 (17 Aug 2020 08:00), Max: 98.4 (17 Aug 2020 08:00)  HR: 109 (17 Aug 2020 08:54) (107 - 134)  BP: --  BP(mean): --  ABP: 158/78 (17 Aug 2020 08:54) (141/66 - 267/267)  ABP(mean): 102 (17 Aug 2020 08:54) (87 - 267)  RR: 12 (17 Aug 2020 08:54) (10 - 38)  SpO2: 100% (17 Aug 2020 08:54) (99% - 100%)      Physical Exam:  Gen:  Awake, alert   CNS: non focal 	  Neck: no JVD  RES : clear , no wheezing              CVS: Sinus tachycardia. Normal S1/S2  Abd: Soft, mildly distended. Bowel sounds present. +tarry stools with no melena  Skin: No rash.  Ext:  no edema    ============================I/O===========================   I&O's Detail    16 Aug 2020 07:01  -  17 Aug 2020 07:00  --------------------------------------------------------  IN:    IV PiggyBack: 150 mL    niCARdipine Infusion: 35 mL    Oral Fluid: 220 mL    sodium chloride 0.9%.: 240 mL  Total IN: 645 mL    OUT:    Voided: 1575 mL  Total OUT: 1575 mL    Total NET: -930 mL      17 Aug 2020 07:01  -  17 Aug 2020 09:42  --------------------------------------------------------  IN:    sodium chloride 0.9%.: 20 mL  Total IN: 20 mL    OUT:    Voided: 200 mL  Total OUT: 200 mL    Total NET: -180 mL        ============================ LABS =========================                        7.6    1.81  )-----------( 132      ( 17 Aug 2020 00:45 )             24.2     08-17    141  |  106  |  26<H>  ----------------------------<  104<H>  4.3   |  23  |  1.14    Ca    8.5      17 Aug 2020 00:45  Mg     2.4     08-17    TPro  5.7<L>  /  Alb  3.3  /  TBili  0.4  /  DBili  x   /  AST  34  /  ALT  27  /  AlkPhos  53  08-16    LIVER FUNCTIONS - ( 16 Aug 2020 01:03 )  Alb: 3.3 g/dL / Pro: 5.7 g/dL / ALK PHOS: 53 U/L / ALT: 27 U/L / AST: 34 U/L / GGT: x             ABG - ( 15 Aug 2020 21:59 )  pH, Arterial: 7.42  pH, Blood: x     /  pCO2: 42    /  pO2: 113   / HCO3: 26    / Base Excess: 2.1   /  SaO2: 99                  ======================Micro/Rad/Cardio=================  Culture: Reviewed   CXR: Reviewed  Echo:Reviewed  ======================================================  PAST MEDICAL & SURGICAL HISTORY:  H/O hemolytic anemia  H/O autoimmune hemolytic anemia  Knee pain, right  HLD (hyperlipidemia)  Former smoker  DVT of upper extremity (deep vein thrombosis)  Hepatitis C virus  GIB (gastrointestinal bleeding)  Ventricular fibrillation: s/p AICD  PAF (paroxysmal atrial fibrillation): on xarelto  Non-Ischemic Cardiomyopathy  SVT (Supraventricular Tachycardia)  HTN  CHF (Congestive Heart Failure)  S/P right heart catheterization: biopsy multiple  H/O heart transplant: 2/2018  Status post left hip replacement  History of Prior Ablation Treatment: for afib  AICD (Automatic Cardioverter/Defibrillator) Present: [employer] with 1 [employer] lead4/1/09- explanted and replaced with Medtronic 2 leads on 9/2/09    ====================ASSESSMENT ==============  Heart transplant 2/2018 for ACC/AHA stage D NICM   Supraventricular tachycardia  severe hypertension  Klebsiella oxytoca   Sepsis  Resolving acute respiratory failure   Hyperlactatemia acidosis  Leukopenia    Plan:  ====================== NEUROLOGY=====================  Nonfocal, continue to monitor neuro status    ==================== RESPIRATORY======================  Comfortable on RA   Encourage incentive spirometry, continue pulse ox monitoring, follow ABGs     ====================CARDIOVASCULAR==================  S/p heart transplant in 2018, c/w Prednisone, off cellcept while on high dose steroids  AICD present; Currently sinus tachycardic S/P IV Amiodarone for SVT.  Blood pressure control with Hydralazine   ASA / Statin for history of HLD and CAD prophylaxis    aspirin enteric coated 81 milliGRAM(s) Oral daily  hydrALAZINE 50 milliGRAM(s) Oral every 8 hours  pravastatin 20 milliGRAM(s) Oral at bedtime  predniSONE   Tablet 40 milliGRAM(s) Oral daily    ===================HEMATOLOGIC/ONC ===================  Anemia 2/2 GI bleed S/P single blood transfusion   Monitor H&H closely with serial CBC's.     VTE prophylaxis, enoxaparin Injectable 40 milliGRAM(s) SubCutaneous daily    ===================== RENAL =========================  Continue to monitor I/Os, BUN/Creatinine, and urine output.   Goal net even fluid balance. Replete lytes PRN. Keep K> 4 and Mg >2.     ==================== GASTROINTESTINAL===================  Black tarry stools, capsule study (8/15), revealed stomach & upper SB lesions.  NPO for f/u with GI for push EGD study to investigate GI bleed    cholecalciferol 1000 Unit(s) Oral daily  dextrose 5%. 1000 milliLiter(s) (50 mL/Hr) IV Continuous <Continuous>  folic acid 1 milliGRAM(s) Oral daily  magnesium oxide 400 milliGRAM(s) Oral daily  GI prophylaxis, pantoprazole  Injectable 40 milliGRAM(s) IV Push every 12 hours  sodium bicarbonate 650 milliGRAM(s) Oral two times a day  sodium chloride 0.9%. 1000 milliLiter(s) (10 mL/Hr) IV Continuous <Continuous>    =======================    ENDOCRINE  =====================  Hyperglycemia requiring Humalog sliding scale, LANTUS, and glucagon prn     dextrose 50% Injectable 12.5 Gram(s) IV Push once  glucagon  Injectable 1 milliGRAM(s) IntraMuscular once PRN Glucose LESS THAN 70 milligrams/deciliter  insulin glargine Injectable (LANTUS) 14 Unit(s) SubCutaneous at bedtime  insulin lispro (HumaLOG) corrective regimen sliding scale   SubCutaneous three times a day before meals  insulin lispro Injectable (HumaLOG) 5 Unit(s) SubCutaneous three times a day with meals    ========================INFECTIOUS DISEASE================  Bacteremia and sepsis, requiring IV antibiotics--Klebsiella in blood, on Meropenem.  Urine and blood cultures from 8/14--NGTD.   Continue transplant prophylaxis     meropenem  IVPB 1000 milliGRAM(s) IV Intermittent every 8 hours  posaconazole DR Tablet 300 milliGRAM(s) Oral daily  trimethoprim   80 mG/sulfamethoxazole 400 mG 1 Tablet(s) Oral daily  CMV prophylaxis, valGANciclovir 450 milliGRAM(s) Oral daily      Patient requires continuous monitoring with bedside rhythm monitoring, pulse ox monitoring, and intermittent blood gas analysis. Care plan discussed with ICU care team. Patient remained critical and at risk for life threatening decompensation.     By signing my name below, I, Ronit Joe, attest that this documentation has been prepared under the direction and in the presence of SHELLY Hooper   Electronically signed: Ab Gaffney, 08-17-20 @ 09:42    IManuel, personally performed the services described in this documentation. all medical record entries made by the ab were at my direction and in my presence. 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 Patient was treated w/ prednisone and Rituximab. Tacro was discontinued and patient was also transitioned to everolimus  Admitted  6/16-6/19  for hyperglycemia.  Reported to transplant team having one done black tarry stool-  instructed to  present to [facility] for hemolytic anemia. Patient has been following with Hematology outpatient.  Denies CP  N/V diarrhea SOB. (11 Aug 2020 20:08)      Surgery/Hospital Course:  8/11 Admitted to [facility] with symptomatic anemia  8/13 EGD for investigation of tarry stools  8/15 SB capsule: stomach & SB lesions, likely ulcers.    Today: Plan for balloon enteroscopy w/ GI today, meropenem changed to cefepime as per ID    REVIEW OF SYSTEMS:  Gen: No fever  EYES/ENT: No visual changes;  No vertigo or throat pain   NECK: No pain   RES:  No shortness of breath or Cough  CARD: No chest pain   GI: No abdominal pain  : No dysuria  NEURO: No weakness  SKIN: No itching, rashes     ICU Vital Signs Last 24 Hrs  T(C): 36.9 (17 Aug 2020 08:00), Max: 36.9 (17 Aug 2020 08:00)  T(F): 98.4 (17 Aug 2020 08:00), Max: 98.4 (17 Aug 2020 08:00)  HR: 109 (17 Aug 2020 08:54) (107 - 134)  BP: --  BP(mean): --  ABP: 158/78 (17 Aug 2020 08:54) (141/66 - 267/267)  ABP(mean): 102 (17 Aug 2020 08:54) (87 - 267)  RR: 12 (17 Aug 2020 08:54) (10 - 38)  SpO2: 100% (17 Aug 2020 08:54) (99% - 100%)      Physical Exam:  Gen:  Awake, alert   CNS: non focal 	  Neck: no JVD  RES : clear , no wheezing              CVS: Sinus tachycardia. Normal S1/S2  Abd: Soft, mildly distended. Bowel sounds present. +tarry stools with no melena  Skin: No rash.  Ext:  no edema    ============================I/O===========================   I&O's Detail    16 Aug 2020 07:01  -  17 Aug 2020 07:00  --------------------------------------------------------  IN:    IV PiggyBack: 150 mL    niCARdipine Infusion: 35 mL    Oral Fluid: 220 mL    sodium chloride 0.9%.: 240 mL  Total IN: 645 mL    OUT:    Voided: 1575 mL  Total OUT: 1575 mL    Total NET: -930 mL      17 Aug 2020 07:01  -  17 Aug 2020 09:42  --------------------------------------------------------  IN:    sodium chloride 0.9%.: 20 mL  Total IN: 20 mL    OUT:    Voided: 200 mL  Total OUT: 200 mL    Total NET: -180 mL        ============================ LABS =========================                        7.6    1.81  )-----------( 132      ( 17 Aug 2020 00:45 )             24.2     08-17    141  |  106  |  26<H>  ----------------------------<  104<H>  4.3   |  23  |  1.14    Ca    8.5      17 Aug 2020 00:45  Mg     2.4     08-17    TPro  5.7<L>  /  Alb  3.3  /  TBili  0.4  /  DBili  x   /  AST  34  /  ALT  27  /  AlkPhos  53  08-16    LIVER FUNCTIONS - ( 16 Aug 2020 01:03 )  Alb: 3.3 g/dL / Pro: 5.7 g/dL / ALK PHOS: 53 U/L / ALT: 27 U/L / AST: 34 U/L / GGT: x             ABG - ( 15 Aug 2020 21:59 )  pH, Arterial: 7.42  pH, Blood: x     /  pCO2: 42    /  pO2: 113   / HCO3: 26    / Base Excess: 2.1   /  SaO2: 99                  ======================Micro/Rad/Cardio=================  Culture: Reviewed   CXR: Reviewed  Echo:Reviewed  ======================================================  PAST MEDICAL & SURGICAL HISTORY:  H/O hemolytic anemia  H/O autoimmune hemolytic anemia  Knee pain, right  HLD (hyperlipidemia)  Former smoker  DVT of upper extremity (deep vein thrombosis)  Hepatitis C virus  GIB (gastrointestinal bleeding)  Ventricular fibrillation: s/p AICD  PAF (paroxysmal atrial fibrillation): on xarelto  Non-Ischemic Cardiomyopathy  SVT (Supraventricular Tachycardia)  HTN  CHF (Congestive Heart Failure)  S/P right heart catheterization: biopsy multiple  H/O heart transplant: 2/2018  Status post left hip replacement  History of Prior Ablation Treatment: for afib  AICD (Automatic Cardioverter/Defibrillator) Present: [employer] with 1 [employer] lead4/1/09- explanted and replaced with Medtronic 2 leads on 9/2/09    ====================ASSESSMENT ==============  Heart transplant 2/2018 for ACC/AHA stage D NICM   Supraventricular tachycardia  severe hypertension  Klebsiella oxytoca   Sepsis  Resolving acute respiratory failure   Hyperlactatemia acidosis  Leukopenia    Plan:  ====================== NEUROLOGY=====================  Nonfocal, continue to monitor neuro status    ==================== RESPIRATORY======================  Comfortable on RA   Encourage incentive spirometry, continue pulse ox monitoring, follow ABGs     ====================CARDIOVASCULAR==================  S/p heart transplant in 2018, c/w Prednisone, off cellcept while on high dose steroids  AICD present; Currently sinus tachycardic S/P IV Amiodarone for SVT.  Blood pressure control with Hydralazine   ASA / Statin for history of HLD and CAD prophylaxis    aspirin enteric coated 81 milliGRAM(s) Oral daily  hydrALAZINE 50 milliGRAM(s) Oral every 8 hours  pravastatin 20 milliGRAM(s) Oral at bedtime  predniSONE   Tablet 40 milliGRAM(s) Oral daily    ===================HEMATOLOGIC/ONC ===================  Anemia 2/2 GI bleed S/P single blood transfusion   Monitor H&H closely with serial CBC's.     VTE prophylaxis, enoxaparin Injectable 40 milliGRAM(s) SubCutaneous daily    ===================== RENAL =========================  Continue to monitor I/Os, BUN/Creatinine, and urine output.   Goal net even fluid balance. Replete lytes PRN. Keep K> 4 and Mg >2.     ==================== GASTROINTESTINAL===================  Black tarry stools, capsule study (8/15), revealed stomach & upper SB lesions.  NPO for f/u with GI for push EGD study to investigate GI bleed    cholecalciferol 1000 Unit(s) Oral daily  dextrose 5%. 1000 milliLiter(s) (50 mL/Hr) IV Continuous <Continuous>  folic acid 1 milliGRAM(s) Oral daily  magnesium oxide 400 milliGRAM(s) Oral daily  GI prophylaxis, pantoprazole  Injectable 40 milliGRAM(s) IV Push every 12 hours  sodium bicarbonate 650 milliGRAM(s) Oral two times a day  sodium chloride 0.9%. 1000 milliLiter(s) (10 mL/Hr) IV Continuous <Continuous>    =======================    ENDOCRINE  =====================  Hyperglycemia requiring Humalog sliding scale, LANTUS, and glucagon prn     dextrose 50% Injectable 12.5 Gram(s) IV Push once  glucagon  Injectable 1 milliGRAM(s) IntraMuscular once PRN Glucose LESS THAN 70 milligrams/deciliter  insulin glargine Injectable (LANTUS) 14 Unit(s) SubCutaneous at bedtime  insulin lispro (HumaLOG) corrective regimen sliding scale   SubCutaneous three times a day before meals  insulin lispro Injectable (HumaLOG) 5 Unit(s) SubCutaneous three times a day with meals    ========================INFECTIOUS DISEASE================  Bacteremia and sepsis, requiring IV antibiotics--Klebsiella in blood, on Meropenem.  Urine and blood cultures from 8/14--NGTD.   Continue transplant prophylaxis     meropenem  IVPB 1000 milliGRAM(s) IV Intermittent every 8 hours  posaconazole DR Tablet 300 milliGRAM(s) Oral daily  trimethoprim   80 mG/sulfamethoxazole 400 mG 1 Tablet(s) Oral daily  CMV prophylaxis, valGANciclovir 450 milliGRAM(s) Oral daily      Patient requires continuous monitoring with bedside rhythm monitoring, pulse ox monitoring, and intermittent blood gas analysis. Care plan discussed with ICU care team. Patient remained critical and at risk for life threatening decompensation.     By signing my name below, Ronit GUDINO, attest that this documentation has been prepared under the direction and in the presence of SHELLY Hooper   Electronically signed: Katty Gaffney, 08-17-20 @ 09:42    Manuel GUDINO, personally performed the services described in this documentation. all medical record entries made by the scribe were at my direction and in my presence. I have reviewed the chart and agree that the record reflects my personal performance and is accurate and complete  Electronically signed: SHELLY Hooper

## 2020-08-17 NOTE — DIETITIAN INITIAL EVALUATION ADULT. - OTHER INFO
Pt seen, out of bed to chair, reports feeling great.   Pt states at home his HHA has been preparing meals and helping manage BG levels.   Checks BG levels several times daily with most number in the low 100's but states does sometimes reach the low 200's. Pt states he continues to follow a heart healthy diet and has cut out all sugary foods. Pt further states he has been consuming all meals with proteins to keep sugars better under control. At this time Pt denies any therapeutic  diet questions.     Pt reports UBW to be 165lbs, denies any recent weight changes. This is consistent with admission weights and standing weights. Bed secale weights are elevated but need to question their accuracy.     Pt reports a good PO intake in house. Currently NPO at this time awaiting EGD.   Pt aware RD remains available for education and to monitor PO Intake     Pt denies GI distress, chewing/swallowing difficulty, micronutrient supplements. NKFA

## 2020-08-17 NOTE — PROGRESS NOTE ADULT - ASSESSMENT
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 He has received a total of 3 units PRBC this admission with slow downtrend of hemoglobin. Labs were not consistent with hemolysis and he reported dark stools for which EGD/enteroscopy was performed which did not reveal source of bleeding but preliminary VCE showing possible gastric ulcer, duodenal angioectasia/erosions. He developed acute respiratory distress and profound sinus tachycardia on 8/13 in setting of Klebsiella bacteremia of unclear origin (preceded EGD) for which CT performed which suggests possible pneumonia. He has clinically improved with antibiotics and awaiting repeat EGD/push enteroscopy. He has a persistent leukopenia.      Review of pertinent studies  8/2020 labs: Direct Jacky +, 4.6% reticulocytes with low RI, normal bilirubin,  (stable). Haptoglobin normal.   8/13: EGD/push enteroscopy unremarkable  8/16: VCE w/ possible gastric ulcer, duodenal angioectasias/erosions, and 1 small bowel angioectasia

## 2020-08-17 NOTE — PROGRESS NOTE ADULT - PROBLEM SELECTOR PLAN 1
-C/w ASA/statin for TCAD ppx  -C/w everolimus 0.75mg BID, will follow levels periodically while hospitalized (true trough in lab). Goal 8-10.  - Continue high dose PDN which is being weaned, however favor a much more aggressive taper schedule at this time  -C/w posi for ppx  -Will d/c Valcyte and Bactrim SS as these may induce myelosuppression; if results return concerning for CMV, will start tx meds  -Off Cellcept while on high dose steroids. Will eventually need to start Cellcept as steroids weaned

## 2020-08-17 NOTE — PRE-ANESTHESIA EVALUATION ADULT - NSANTHOSAYNRD_GEN_A_CORE
No. TRINY screening performed.  STOP BANG Legend: 0-2 = LOW Risk; 3-4 = INTERMEDIATE Risk; 5-8 = HIGH Risk
No. TRINY screening performed.  STOP BANG Legend: 0-2 = LOW Risk; 3-4 = INTERMEDIATE Risk; 5-8 = HIGH Risk

## 2020-08-18 LAB
ALBUMIN SERPL ELPH-MCNC: 3.1 G/DL — LOW (ref 3.3–5)
ALP SERPL-CCNC: 57 U/L — SIGNIFICANT CHANGE UP (ref 40–120)
ALT FLD-CCNC: 28 U/L — SIGNIFICANT CHANGE UP (ref 10–45)
ANION GAP SERPL CALC-SCNC: 11 MMOL/L — SIGNIFICANT CHANGE UP (ref 5–17)
AST SERPL-CCNC: 32 U/L — SIGNIFICANT CHANGE UP (ref 10–40)
BILIRUB SERPL-MCNC: 0.4 MG/DL — SIGNIFICANT CHANGE UP (ref 0.2–1.2)
BUN SERPL-MCNC: 24 MG/DL — HIGH (ref 7–23)
CALCIUM SERPL-MCNC: 8.5 MG/DL — SIGNIFICANT CHANGE UP (ref 8.4–10.5)
CHLORIDE SERPL-SCNC: 104 MMOL/L — SIGNIFICANT CHANGE UP (ref 96–108)
CO2 SERPL-SCNC: 23 MMOL/L — SIGNIFICANT CHANGE UP (ref 22–31)
CREAT SERPL-MCNC: 1.06 MG/DL — SIGNIFICANT CHANGE UP (ref 0.5–1.3)
GALACTOMANNAN AG SERPL-ACNC: <0.5 INDEX — SIGNIFICANT CHANGE UP
GAS PNL BLDA: SIGNIFICANT CHANGE UP
GLUCOSE BLDC GLUCOMTR-MCNC: 124 MG/DL — HIGH (ref 70–99)
GLUCOSE BLDC GLUCOMTR-MCNC: 151 MG/DL — HIGH (ref 70–99)
GLUCOSE BLDC GLUCOMTR-MCNC: 160 MG/DL — HIGH (ref 70–99)
GLUCOSE BLDC GLUCOMTR-MCNC: 215 MG/DL — HIGH (ref 70–99)
GLUCOSE SERPL-MCNC: 126 MG/DL — HIGH (ref 70–99)
HCT VFR BLD CALC: 26.1 % — LOW (ref 39–50)
HGB BLD-MCNC: 8.4 G/DL — LOW (ref 13–17)
MAGNESIUM SERPL-MCNC: 2.4 MG/DL — SIGNIFICANT CHANGE UP (ref 1.6–2.6)
MCHC RBC-ENTMCNC: 31.8 PG — SIGNIFICANT CHANGE UP (ref 27–34)
MCHC RBC-ENTMCNC: 32.2 GM/DL — SIGNIFICANT CHANGE UP (ref 32–36)
MCV RBC AUTO: 98.9 FL — SIGNIFICANT CHANGE UP (ref 80–100)
NRBC # BLD: 0 /100 WBCS — SIGNIFICANT CHANGE UP (ref 0–0)
PHOSPHATE SERPL-MCNC: 2.6 MG/DL — SIGNIFICANT CHANGE UP (ref 2.5–4.5)
PLATELET # BLD AUTO: 155 K/UL — SIGNIFICANT CHANGE UP (ref 150–400)
POTASSIUM SERPL-MCNC: 4.1 MMOL/L — SIGNIFICANT CHANGE UP (ref 3.5–5.3)
POTASSIUM SERPL-SCNC: 4.1 MMOL/L — SIGNIFICANT CHANGE UP (ref 3.5–5.3)
PROT SERPL-MCNC: 5.5 G/DL — LOW (ref 6–8.3)
RBC # BLD: 2.64 M/UL — LOW (ref 4.2–5.8)
RBC # FLD: 19 % — HIGH (ref 10.3–14.5)
SARS-COV-2 RNA SPEC QL NAA+PROBE: SIGNIFICANT CHANGE UP
SODIUM SERPL-SCNC: 138 MMOL/L — SIGNIFICANT CHANGE UP (ref 135–145)
WBC # BLD: 2.59 K/UL — LOW (ref 3.8–10.5)
WBC # FLD AUTO: 2.59 K/UL — LOW (ref 3.8–10.5)

## 2020-08-18 PROCEDURE — 99233 SBSQ HOSP IP/OBS HIGH 50: CPT

## 2020-08-18 PROCEDURE — 99232 SBSQ HOSP IP/OBS MODERATE 35: CPT

## 2020-08-18 PROCEDURE — 71045 X-RAY EXAM CHEST 1 VIEW: CPT | Mod: 26

## 2020-08-18 PROCEDURE — 99232 SBSQ HOSP IP/OBS MODERATE 35: CPT | Mod: GC

## 2020-08-18 PROCEDURE — 99291 CRITICAL CARE FIRST HOUR: CPT

## 2020-08-18 RX ORDER — SODIUM CHLORIDE 9 MG/ML
3 INJECTION INTRAMUSCULAR; INTRAVENOUS; SUBCUTANEOUS EVERY 8 HOURS
Refills: 0 | Status: DISCONTINUED | OUTPATIENT
Start: 2020-08-18 | End: 2020-08-18

## 2020-08-18 RX ORDER — HYDRALAZINE HCL 50 MG
75 TABLET ORAL EVERY 8 HOURS
Refills: 0 | Status: DISCONTINUED | OUTPATIENT
Start: 2020-08-18 | End: 2020-08-20

## 2020-08-18 RX ADMIN — EVEROLIMUS 0.75 MILLIGRAM(S): 10 TABLET ORAL at 19:59

## 2020-08-18 RX ADMIN — CEFEPIME 100 MILLIGRAM(S): 1 INJECTION, POWDER, FOR SOLUTION INTRAMUSCULAR; INTRAVENOUS at 21:17

## 2020-08-18 RX ADMIN — Medication 1 MILLIGRAM(S): at 12:06

## 2020-08-18 RX ADMIN — Medication 650 MILLIGRAM(S): at 05:49

## 2020-08-18 RX ADMIN — Medication 75 MILLIGRAM(S): at 21:14

## 2020-08-18 RX ADMIN — PANTOPRAZOLE SODIUM 40 MILLIGRAM(S): 20 TABLET, DELAYED RELEASE ORAL at 19:06

## 2020-08-18 RX ADMIN — Medication 2 GRAM(S): at 05:49

## 2020-08-18 RX ADMIN — Medication 2 GRAM(S): at 18:29

## 2020-08-18 RX ADMIN — Medication 650 MILLIGRAM(S): at 18:29

## 2020-08-18 RX ADMIN — Medication 5 UNIT(S): at 12:18

## 2020-08-18 RX ADMIN — Medication 1000 UNIT(S): at 12:06

## 2020-08-18 RX ADMIN — Medication 75 MILLIGRAM(S): at 05:51

## 2020-08-18 RX ADMIN — Medication 20 MILLIGRAM(S): at 21:14

## 2020-08-18 RX ADMIN — Medication 5 UNIT(S): at 08:23

## 2020-08-18 RX ADMIN — Medication 5 UNIT(S): at 18:28

## 2020-08-18 RX ADMIN — Medication 2: at 18:28

## 2020-08-18 RX ADMIN — Medication 75 MILLIGRAM(S): at 15:09

## 2020-08-18 RX ADMIN — CEFEPIME 100 MILLIGRAM(S): 1 INJECTION, POWDER, FOR SOLUTION INTRAMUSCULAR; INTRAVENOUS at 15:09

## 2020-08-18 RX ADMIN — CEFEPIME 100 MILLIGRAM(S): 1 INJECTION, POWDER, FOR SOLUTION INTRAMUSCULAR; INTRAVENOUS at 05:49

## 2020-08-18 RX ADMIN — POSACONAZOLE 300 MILLIGRAM(S): 100 TABLET, DELAYED RELEASE ORAL at 12:06

## 2020-08-18 RX ADMIN — Medication 4: at 12:18

## 2020-08-18 RX ADMIN — EVEROLIMUS 0.75 MILLIGRAM(S): 10 TABLET ORAL at 07:55

## 2020-08-18 RX ADMIN — SODIUM CHLORIDE 3 MILLILITER(S): 9 INJECTION INTRAMUSCULAR; INTRAVENOUS; SUBCUTANEOUS at 05:48

## 2020-08-18 RX ADMIN — MAGNESIUM OXIDE 400 MG ORAL TABLET 400 MILLIGRAM(S): 241.3 TABLET ORAL at 12:06

## 2020-08-18 RX ADMIN — INSULIN GLARGINE 14 UNIT(S): 100 INJECTION, SOLUTION SUBCUTANEOUS at 22:13

## 2020-08-18 RX ADMIN — SODIUM CHLORIDE 3 MILLILITER(S): 9 INJECTION INTRAMUSCULAR; INTRAVENOUS; SUBCUTANEOUS at 21:19

## 2020-08-18 RX ADMIN — Medication 40 MILLIGRAM(S): at 05:49

## 2020-08-18 RX ADMIN — SODIUM CHLORIDE 3 MILLILITER(S): 9 INJECTION INTRAMUSCULAR; INTRAVENOUS; SUBCUTANEOUS at 15:00

## 2020-08-18 RX ADMIN — PANTOPRAZOLE SODIUM 40 MILLIGRAM(S): 20 TABLET, DELAYED RELEASE ORAL at 05:49

## 2020-08-18 RX ADMIN — ENOXAPARIN SODIUM 40 MILLIGRAM(S): 100 INJECTION SUBCUTANEOUS at 12:06

## 2020-08-18 NOTE — PROGRESS NOTE ADULT - PROVIDER SPECIALTY LIST ADULT
Infectious Disease Left VM message requesting return call from Janie NIÑO confirming patient's receipt of insulin pen needle refill.

## 2020-08-18 NOTE — PROGRESS NOTE ADULT - SUBJECTIVE AND OBJECTIVE BOX
Patient seen and examined at bedside.    Overnight Events:     Review Of Systems: No chest pain, shortness of breath, or palpitations            Current Meds:  aspirin enteric coated 81 milliGRAM(s) Oral daily  cefepime   IVPB 1000 milliGRAM(s) IV Intermittent every 8 hours  cholecalciferol 1000 Unit(s) Oral daily  dextrose 5%. 1000 milliLiter(s) IV Continuous <Continuous>  enoxaparin Injectable 40 milliGRAM(s) SubCutaneous daily  everolimus (ZORTRESS) 0.75 milliGRAM(s) Oral <User Schedule>  folic acid 1 milliGRAM(s) Oral daily  hydrALAZINE 75 milliGRAM(s) Oral every 8 hours  insulin glargine Injectable (LANTUS) 14 Unit(s) SubCutaneous at bedtime  insulin lispro (HumaLOG) corrective regimen sliding scale   SubCutaneous three times a day before meals  insulin lispro Injectable (HumaLOG) 5 Unit(s) SubCutaneous three times a day with meals  magnesium oxide 400 milliGRAM(s) Oral daily  omega-3-Acid Ethyl Esters 2 Gram(s) Oral two times a day  pantoprazole  Injectable 40 milliGRAM(s) IV Push every 12 hours  posaconazole DR Tablet 300 milliGRAM(s) Oral daily  pravastatin 20 milliGRAM(s) Oral at bedtime  predniSONE   Tablet 40 milliGRAM(s) Oral daily  sodium bicarbonate 650 milliGRAM(s) Oral two times a day  sodium chloride 0.9% lock flush 3 milliLiter(s) IV Push every 8 hours  sodium chloride 0.9%. 1000 milliLiter(s) IV Continuous <Continuous>      Vitals:  T(F): 97 (08-18), Max: 98.4 (08-17)  HR: 123 (08-18) (103 - 128)  BP: --  RR: 16 (08-18)  SpO2: 99% (08-18)  I&O's Summary    17 Aug 2020 07:01  -  18 Aug 2020 07:00  --------------------------------------------------------  IN: 1530 mL / OUT: 1425 mL / NET: 105 mL        Physical Exam:  Gen: NAD.  HEENT: NCAT. PERRLA b/l.  Neck: No JVP elev.  CV: Normal S1, S2. RRR. No MRG.  Chest: CTAB. No WRR.  Abd: +BSx4. Soft. NTND.  Ext: No LE edema.  Skin: No cyanosis.                          8.4    2.59  )-----------( 155      ( 18 Aug 2020 00:33 )             26.1     08-18    138  |  104  |  24<H>  ----------------------------<  126<H>  4.1   |  23  |  1.06    Ca    8.5      18 Aug 2020 00:33  Phos  2.6     08-18  Mg     2.4     08-18    TPro  5.5<L>  /  Alb  3.1<L>  /  TBili  0.4  /  DBili  x   /  AST  32  /  ALT  28  /  AlkPhos  57  08-18      CARDIAC MARKERS ( 13 Aug 2020 23:51 )  36 ng/L / x     / x     / 61 U/L / x     / 2.7 ng/mL  CARDIAC MARKERS ( 13 Aug 2020 22:53 )  25 ng/L / x     / x     / 78 U/L / x     / 3.0 ng/mL      Serum Pro-Brain Natriuretic Peptide: 736 pg/mL (08-11 @ 22:28)          New ECG(s): Personally reviewed    Echo:    Stress Testing:     Cath:    Imaging:    Interpretation of Telemetry: Patient seen and examined at bedside.    Overnight Events: NAEO. Pt had repeat EGD/push enteroscopy yest, which revealed gastropathy/esophagitis, angioectasias. Gastric ulcer seen on VCE most likely a scope trauma. Gastric biopsies taken for CMV.    Review Of Systems: No chest pain, shortness of breath, or palpitations            Current Meds:  aspirin enteric coated 81 milliGRAM(s) Oral daily  cefepime   IVPB 1000 milliGRAM(s) IV Intermittent every 8 hours  cholecalciferol 1000 Unit(s) Oral daily  dextrose 5%. 1000 milliLiter(s) IV Continuous <Continuous>  enoxaparin Injectable 40 milliGRAM(s) SubCutaneous daily  everolimus (ZORTRESS) 0.75 milliGRAM(s) Oral <User Schedule>  folic acid 1 milliGRAM(s) Oral daily  hydrALAZINE 75 milliGRAM(s) Oral every 8 hours  insulin glargine Injectable (LANTUS) 14 Unit(s) SubCutaneous at bedtime  insulin lispro (HumaLOG) corrective regimen sliding scale   SubCutaneous three times a day before meals  insulin lispro Injectable (HumaLOG) 5 Unit(s) SubCutaneous three times a day with meals  magnesium oxide 400 milliGRAM(s) Oral daily  omega-3-Acid Ethyl Esters 2 Gram(s) Oral two times a day  pantoprazole  Injectable 40 milliGRAM(s) IV Push every 12 hours  posaconazole DR Tablet 300 milliGRAM(s) Oral daily  pravastatin 20 milliGRAM(s) Oral at bedtime  predniSONE   Tablet 40 milliGRAM(s) Oral daily  sodium bicarbonate 650 milliGRAM(s) Oral two times a day  sodium chloride 0.9% lock flush 3 milliLiter(s) IV Push every 8 hours  sodium chloride 0.9%. 1000 milliLiter(s) IV Continuous <Continuous>      Vitals:  T(F): 97 (08-18), Max: 98.4 (08-17)  HR: 123 (08-18) (103 - 128)  BP: --  RR: 16 (08-18)  SpO2: 99% (08-18)  I&O's Summary    17 Aug 2020 07:01  -  18 Aug 2020 07:00  --------------------------------------------------------  IN: 1530 mL / OUT: 1425 mL / NET: 105 mL        Physical Exam:  Gen: NAD.  HEENT: NCAT.  Neck: No JVP elev.  CV: Normal S1, S2. Tachy w/ reg rhythm. Continuous murmur.  Chest: CTAB. No WRR.  Abd: +BSx4. Soft. NTND.  Ext: No LE edema.  Skin: No cyanosis.                          8.4    2.59  )-----------( 155      ( 18 Aug 2020 00:33 )             26.1     08-18    138  |  104  |  24<H>  ----------------------------<  126<H>  4.1   |  23  |  1.06    Ca    8.5      18 Aug 2020 00:33  Phos  2.6     08-18  Mg     2.4     08-18    TPro  5.5<L>  /  Alb  3.1<L>  /  TBili  0.4  /  DBili  x   /  AST  32  /  ALT  28  /  AlkPhos  57  08-18      CARDIAC MARKERS ( 13 Aug 2020 23:51 )  36 ng/L / x     / x     / 61 U/L / x     / 2.7 ng/mL  CARDIAC MARKERS ( 13 Aug 2020 22:53 )  25 ng/L / x     / x     / 78 U/L / x     / 3.0 ng/mL      Serum Pro-Brain Natriuretic Peptide: 736 pg/mL (08-11 @ 22:28)

## 2020-08-18 NOTE — PROGRESS NOTE ADULT - SUBJECTIVE AND OBJECTIVE BOX
INFECTIOUS DISEASES FOLLOW UP-- Sujey Lujan  [phone]    This is a follow up note for this  70y Male with  Leukopenia  K.oxytoca bacteremia  Post OHT in 02/2018    Interval event:  - EGD done yesterday: esophagitis, acute gastritis s/p biopsy, no ulceration  - Valcyte and Bactrim d/c'ed on 8/17 due to leukopenia  - Fungitell cancelled by lab, need to resend      ROS:  CONSTITUTIONAL:  No fever, good appetite  CARDIOVASCULAR:  No chest pain or palpitations  RESPIRATORY:  No dyspnea  GASTROINTESTINAL:  No nausea, vomiting, diarrhea, or abdominal pain  GENITOURINARY:  No dysuria  NEUROLOGIC:  No headache,     Allergies    No Known Allergies    Intolerances    ANTIMICROBIALS:    cefepime   IVPB 1000 every 8 hours  posaconazole DR Tablet 300 daily      OTHER MEDS:  MEDICATIONS  (STANDING):  enoxaparin Injectable 40 daily  everolimus (ZORTRESS) 0.75 <User Schedule>  hydrALAZINE 75 every 8 hours  insulin glargine Injectable (LANTUS) 14 at bedtime  insulin lispro (HumaLOG) corrective regimen sliding scale  three times a day before meals  insulin lispro Injectable (HumaLOG) 5 three times a day with meals  pantoprazole  Injectable 40 every 12 hours  pravastatin 20 at bedtime  predniSONE   Tablet 40 daily    Vital Signs Last 24 Hrs  T(F): 98.9 (08-18-20 @ 12:00), Max: 99.9 (08-14-20 @ 00:00)    Vital Signs Last 24 Hrs  HR: 115 (08-18-20 @ 12:00) (106 - 129)  BP: 129/89 (08-18-20 @ 12:00) (129/89 - 154/72)  RR: 21 (08-18-20 @ 12:00)  SpO2: 100% (08-18-20 @ 12:00) (95% - 100%)  Wt(kg): --    PHYSICAL EXAM:  Constitutional:no acute distress  Eyes:WALT, EOMI  Ear/Nose/Throat: no oral lesions, 	  Respiratory: clear BL sternotomy healed  Cardiovascular: S1S2  Gastrointestinal:soft, (+) BS, no tenderness  Extremities:no e/e/c  No Lymphadenopathy  IV sites not inflammed.    Labs:                        8.4    2.59  )-----------( 155      ( 18 Aug 2020 00:33 )             26.1     08-18    138  |  104  |  24<H>  ----------------------------<  126<H>  4.1   |  23  |  1.06    Ca    8.5      18 Aug 2020 00:33  Phos  2.6     08-18  Mg     2.4     08-18    TPro  5.5<L>  /  Alb  3.1<L>  /  TBili  0.4  /  DBili  x   /  AST  32  /  ALT  28  /  AlkPhos  57  08-18      Auto Eosinophil %: 0.9 % (08-17-20 @ 12:50)      WBC Trend:  WBC Count: 2.59 (08-18-20 @ 00:33)  WBC Count: 2.08 (08-17-20 @ 12:50)  WBC Count: 1.81 (08-17-20 @ 00:45)  WBC Count: 2.84 (08-16-20 @ 01:03)  WBC Count: 3.71 (08-15-20 @ 01:25)  WBC Count: 4.76 (08-14-20 @ 14:14)  WBC Count: 6.23 (08-13-20 @ 23:51)  WBC Count: 6.43 (08-13-20 @ 22:53)      Creatine Trend:  Creatinine, Serum: 1.06 (08-18)  Creatinine, Serum: 1.14 (08-17)  Creatinine, Serum: 1.40 (08-16)  Creatinine, Serum: 1.32 (08-15)  Creatinine, Serum: 1.37 (08-13)  Creatinine, Serum: 1.07 (08-13)  Creatinine, Serum: 1.26 (08-13)  Creatinine, Serum: 1.20 (08-12)  Creatinine, Serum: 1.42 (08-11)  Creatinine, Serum: 1.47 (08-11)  Creatinine, Serum: 1.39 (08-11)      Liver Biochemical Testing Trend:  Alanine Aminotransferase (ALT/SGPT): 28 (08-18)  Alanine Aminotransferase (ALT/SGPT): 27 (08-16)  Aspartate Aminotransferase (AST/SGOT): 32 (08-18-20 @ 00:33)  Aspartate Aminotransferase (AST/SGOT): 34 (08-16-20 @ 01:03)  Bilirubin Total, Serum: 0.4 (08-18)  Bilirubin Total, Serum: 0.4 (08-16)    MICROBIOLOGY:    Culture - Urine (collected 14 Aug 2020 13:32)  Source: .Urine Clean Catch (Midstream)  Final Report (15 Aug 2020 11:59):    No growth    Culture - Blood (collected 14 Aug 2020 01:32)  Source: .Blood Blood  Preliminary Report (15 Aug 2020 02:02):    No growth to date.    Culture - Blood (collected 14 Aug 2020 01:32)  Source: .Blood Blood  Preliminary Report (15 Aug 2020 02:02):    No growth to date.    Culture - Blood (collected 13 Aug 2020 14:48)  Source: .Blood Blood-Peripheral  Gram Stain (14 Aug 2020 02:20):    Growth in anaerobic bottle: Gram Negative Rods    Growth in aerobic bottle: Gram Negative Rods  Final Report (15 Aug 2020 17:30):    Growth in aerobic and anaerobic bottles: Klebsiella oxytoca/Raoutella    ornithinolytica    See previous culture [id number]    Culture - Blood in AM (08.13.20 @ 12:14)    Gram Stain:   Growth in anaerobic bottle: Gram Negative Rods    -  Levofloxacin: S <=0.5    -  Meropenem: S <=1    -  Klebsiella oxytoca: Detec    -  Ampicillin/Sulbactam: I 16/8 Enterobacter, Citrobacter, and Serratia may develop resistance during prolonged therapy (3-4 days)    -  Ceftriaxone: S <=1 Enterobacter, Citrobacter, and Serratia may develop resistance during prolonged therapy    -  Aztreonam: S <=4    -  Ciprofloxacin: S <=0.25    -  Ertapenem: S <=0.5    -  Imipenem: S <=1    -  Cefepime: S <=2    -  Piperacillin/Tazobactam: S <=8    -  Tobramycin: S <=2    -  Amikacin: S <=16    -  Ampicillin: R >16 These ampicillin results predict results for amoxicillin    -  Gentamicin: S <=2    -  Cefoxitin: S <=8    -  Cefazolin: R >16 Enterobacter, Citrobacter, and Serratia may develop resistance during prolonged therapy (3-4 days)    -  Trimethoprim/Sulfamethoxazole: R >2/38    Specimen Source: .Blood Blood-Peripheral    Organism: Blood Culture PCR    Organism: Klebsiella oxytoca /Raoutella ornithinolytica    Culture Results:   Growth in anaerobic bottle: Klebsiella oxytoca/Raoutella ornithinolytica  "Due to technical problems, Proteus sp. will Not be reported as part of  the BCID panel until further notice"  ***Blood Panel PCR results on this specimen are available  approximately 3 hours after the Gram stain result.***  Gram stain, PCR, and/or culture results may not always  correspond due to difference in methodologies.      RADIOLOGY & ADDITIONAL STUDIES:    < from: Xray Chest 1 View- PORTABLE-Routine (08.17.20 @ 02:42) >  FINDINGS/  impression:  The size of the cardiomediastinal silhouette cannot be accurately assessed in this single projection.  There are no focal pulmonary consolidations.    Elevated right hemidiaphragm. Small right pleural effusion and right basilar atelectasis. Hazy opacities throughout the left lung.    < end of copied text > INFECTIOUS DISEASES FOLLOW UP-- Sujey Lujan  [phone]    This is a follow up note for this  70y Male with  Leukopenia  K.oxytoca bacteremia  Post OHT in 02/2018    Interval event:  - EGD done yesterday: esophagitis, acute gastritis s/p biopsy, no ulceration  - Valcyte and Bactrim d/c'ed on 8/17 due to leukopenia  - Fungitell cancelled by lab, need to resend  - Galactomman < 0.5      ROS:  CONSTITUTIONAL:  No fever, good appetite  CARDIOVASCULAR:  No chest pain or palpitations  RESPIRATORY:  No dyspnea  GASTROINTESTINAL:  No nausea, vomiting, diarrhea, or abdominal pain  GENITOURINARY:  No dysuria  NEUROLOGIC:  No headache,     Allergies    No Known Allergies    Intolerances    ANTIMICROBIALS:    cefepime   IVPB 1000 every 8 hours  posaconazole DR Tablet 300 daily      OTHER MEDS:  MEDICATIONS  (STANDING):  enoxaparin Injectable 40 daily  everolimus (ZORTRESS) 0.75 <User Schedule>  hydrALAZINE 75 every 8 hours  insulin glargine Injectable (LANTUS) 14 at bedtime  insulin lispro (HumaLOG) corrective regimen sliding scale  three times a day before meals  insulin lispro Injectable (HumaLOG) 5 three times a day with meals  pantoprazole  Injectable 40 every 12 hours  pravastatin 20 at bedtime  predniSONE   Tablet 40 daily    Vital Signs Last 24 Hrs  T(F): 98.9 (08-18-20 @ 12:00), Max: 99.9 (08-14-20 @ 00:00)    Vital Signs Last 24 Hrs  HR: 115 (08-18-20 @ 12:00) (106 - 129)  BP: 129/89 (08-18-20 @ 12:00) (129/89 - 154/72)  RR: 21 (08-18-20 @ 12:00)  SpO2: 100% (08-18-20 @ 12:00) (95% - 100%)  Wt(kg): --    PHYSICAL EXAM:  Constitutional:no acute distress  Eyes:WALT, EOMI  Ear/Nose/Throat: no oral lesions, 	  Respiratory: clear BL sternotomy healed  Cardiovascular: S1S2  Gastrointestinal:soft, (+) BS, no tenderness  Extremities:no e/e/c  No Lymphadenopathy  IV sites not inflammed.    Labs:                        8.4    2.59  )-----------( 155      ( 18 Aug 2020 00:33 )             26.1     08-18    138  |  104  |  24<H>  ----------------------------<  126<H>  4.1   |  23  |  1.06    Ca    8.5      18 Aug 2020 00:33  Phos  2.6     08-18  Mg     2.4     08-18    TPro  5.5<L>  /  Alb  3.1<L>  /  TBili  0.4  /  DBili  x   /  AST  32  /  ALT  28  /  AlkPhos  57  08-18      Auto Eosinophil %: 0.9 % (08-17-20 @ 12:50)      WBC Trend:  WBC Count: 2.59 (08-18-20 @ 00:33)  WBC Count: 2.08 (08-17-20 @ 12:50)  WBC Count: 1.81 (08-17-20 @ 00:45)  WBC Count: 2.84 (08-16-20 @ 01:03)  WBC Count: 3.71 (08-15-20 @ 01:25)  WBC Count: 4.76 (08-14-20 @ 14:14)  WBC Count: 6.23 (08-13-20 @ 23:51)  WBC Count: 6.43 (08-13-20 @ 22:53)      Creatine Trend:  Creatinine, Serum: 1.06 (08-18)  Creatinine, Serum: 1.14 (08-17)  Creatinine, Serum: 1.40 (08-16)  Creatinine, Serum: 1.32 (08-15)  Creatinine, Serum: 1.37 (08-13)  Creatinine, Serum: 1.07 (08-13)  Creatinine, Serum: 1.26 (08-13)  Creatinine, Serum: 1.20 (08-12)  Creatinine, Serum: 1.42 (08-11)  Creatinine, Serum: 1.47 (08-11)  Creatinine, Serum: 1.39 (08-11)      Liver Biochemical Testing Trend:  Alanine Aminotransferase (ALT/SGPT): 28 (08-18)  Alanine Aminotransferase (ALT/SGPT): 27 (08-16)  Aspartate Aminotransferase (AST/SGOT): 32 (08-18-20 @ 00:33)  Aspartate Aminotransferase (AST/SGOT): 34 (08-16-20 @ 01:03)  Bilirubin Total, Serum: 0.4 (08-18)  Bilirubin Total, Serum: 0.4 (08-16)    MICROBIOLOGY:    Culture - Urine (collected 14 Aug 2020 13:32)  Source: .Urine Clean Catch (Midstream)  Final Report (15 Aug 2020 11:59):    No growth    Culture - Blood (collected 14 Aug 2020 01:32)  Source: .Blood Blood  Preliminary Report (15 Aug 2020 02:02):    No growth to date.    Culture - Blood (collected 14 Aug 2020 01:32)  Source: .Blood Blood  Preliminary Report (15 Aug 2020 02:02):    No growth to date.    Culture - Blood (collected 13 Aug 2020 14:48)  Source: .Blood Blood-Peripheral  Gram Stain (14 Aug 2020 02:20):    Growth in anaerobic bottle: Gram Negative Rods    Growth in aerobic bottle: Gram Negative Rods  Final Report (15 Aug 2020 17:30):    Growth in aerobic and anaerobic bottles: Klebsiella oxytoca/Raoutella    ornithinolytica    See previous culture [id number]    Culture - Blood in AM (08.13.20 @ 12:14)    Gram Stain:   Growth in anaerobic bottle: Gram Negative Rods    -  Levofloxacin: S <=0.5    -  Meropenem: S <=1    -  Klebsiella oxytoca: Detec    -  Ampicillin/Sulbactam: I 16/8 Enterobacter, Citrobacter, and Serratia may develop resistance during prolonged therapy (3-4 days)    -  Ceftriaxone: S <=1 Enterobacter, Citrobacter, and Serratia may develop resistance during prolonged therapy    -  Aztreonam: S <=4    -  Ciprofloxacin: S <=0.25    -  Ertapenem: S <=0.5    -  Imipenem: S <=1    -  Cefepime: S <=2    -  Piperacillin/Tazobactam: S <=8    -  Tobramycin: S <=2    -  Amikacin: S <=16    -  Ampicillin: R >16 These ampicillin results predict results for amoxicillin    -  Gentamicin: S <=2    -  Cefoxitin: S <=8    -  Cefazolin: R >16 Enterobacter, Citrobacter, and Serratia may develop resistance during prolonged therapy (3-4 days)    -  Trimethoprim/Sulfamethoxazole: R >2/38    Specimen Source: .Blood Blood-Peripheral    Organism: Blood Culture PCR    Organism: Klebsiella oxytoca /Raoutella ornithinolytica    Culture Results:   Growth in anaerobic bottle: Klebsiella oxytoca/Raoutella ornithinolytica  "Due to technical problems, Proteus sp. will Not be reported as part of  the BCID panel until further notice"  ***Blood Panel PCR results on this specimen are available  approximately 3 hours after the Gram stain result.***  Gram stain, PCR, and/or culture results may not always  correspond due to difference in methodologies.    Aspergillus Galactomannan Antigen (08.14.20 @ 23:30)    Aspergillus Galactomannan Antigen: <0.500    RADIOLOGY & ADDITIONAL STUDIES:    < from: Xray Chest 1 View- PORTABLE-Routine (08.17.20 @ 02:42) >  FINDINGS/  impression:  The size of the cardiomediastinal silhouette cannot be accurately assessed in this single projection.  There are no focal pulmonary consolidations.    Elevated right hemidiaphragm. Small right pleural effusion and right basilar atelectasis. Hazy opacities throughout the left lung.    < end of copied text >

## 2020-08-18 NOTE — PROGRESS NOTE ADULT - SUBJECTIVE AND OBJECTIVE BOX
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 Patient was treated w/ prednisone and Rituximab. Tacro was discontinued and patient was also transitioned to everolimus  Admitted  6/16-6/19  for hyperglycemia.  Reported to transplant team having one done black tarry stool-  instructed to  present to [facility] for hemolytic anemia. Patient has been following with Hematology outpatient.  Denies CP  N/V diarrhea SOB. (11 Aug 2020 20:08)      Surgery/Hospital Course:  8/11 Admitted to [facility] with symptomatic anemia  8/13 EGD for investigation of tarry stools  8/15 SB capsule: stomach & SB lesions, likely ulcers.  8/17 balloon enteroscopy with GI    Today:    REVIEW OF SYSTEMS:  Gen: No fever  EYES/ENT: No visual changes;  No vertigo or throat pain   NECK: No pain   RES:  No shortness of breath or Cough  CARD: No chest pain   GI: No abdominal pain  : No dysuria  NEURO: No weakness  SKIN: No itching, rashes     ICU Vital Signs Last 24 Hrs  T(C): 36.1 (18 Aug 2020 04:00), Max: 36.9 (17 Aug 2020 08:00)  T(F): 97 (18 Aug 2020 04:00), Max: 98.4 (17 Aug 2020 08:00)  HR: 123 (18 Aug 2020 07:00) (103 - 128)  BP: --  BP(mean): --  ABP: 118/55 (18 Aug 2020 07:00) (118/55 - 185/89)  ABP(mean): 79 (18 Aug 2020 07:00) (79 - 120)  RR: 16 (18 Aug 2020 07:00) (12 - 38)  SpO2: 99% (18 Aug 2020 07:00) (71% - 100%)      Physical Exam:  Gen:  Awake, alert   CNS: non focal 	  Neck: no JVD  RES : clear , no wheezing              CVS: Sinus tachycardia. Normal S1/S2  Abd: Soft, mildly distended. Bowel sounds present. +tarry stools with no melena  Skin: No rash.  Ext:  no edema    ============================I/O===========================   I&O's Detail    17 Aug 2020 07:01  -  18 Aug 2020 07:00  --------------------------------------------------------  IN:    IV PiggyBack: 150 mL    Oral Fluid: 1140 mL    sodium chloride 0.9%.: 240 mL  Total IN: 1530 mL    OUT:    Voided: 1425 mL  Total OUT: 1425 mL    Total NET: 105 mL        ============================ LABS =========================                        8.4    2.59  )-----------( 155      ( 18 Aug 2020 00:33 )             26.1     08-18    138  |  104  |  24<H>  ----------------------------<  126<H>  4.1   |  23  |  1.06    Ca    8.5      18 Aug 2020 00:33  Phos  2.6     08-18  Mg     2.4     08-18    TPro  5.5<L>  /  Alb  3.1<L>  /  TBili  0.4  /  DBili  x   /  AST  32  /  ALT  28  /  AlkPhos  57  08-18    LIVER FUNCTIONS - ( 18 Aug 2020 00:33 )  Alb: 3.1 g/dL / Pro: 5.5 g/dL / ALK PHOS: 57 U/L / ALT: 28 U/L / AST: 32 U/L / GGT: x             ABG - ( 18 Aug 2020 00:16 )  pH, Arterial: 7.43  pH, Blood: x     /  pCO2: 39    /  pO2: 134   / HCO3: 26    / Base Excess: 1.7   /  SaO2: 99                  ======================Micro/Rad/Cardio=================  Culture: Reviewed   CXR: Reviewed  Echo:Reviewed  ======================================================  PAST MEDICAL & SURGICAL HISTORY:  H/O hemolytic anemia  H/O autoimmune hemolytic anemia  Knee pain, right  HLD (hyperlipidemia)  Former smoker  DVT of upper extremity (deep vein thrombosis)  Hepatitis C virus  GIB (gastrointestinal bleeding)  Ventricular fibrillation: s/p AICD  PAF (paroxysmal atrial fibrillation): on xarelto  Non-Ischemic Cardiomyopathy  SVT (Supraventricular Tachycardia)  HTN  CHF (Congestive Heart Failure)  S/P right heart catheterization: biopsy multiple  H/O heart transplant: 2/2018  Status post left hip replacement  History of Prior Ablation Treatment: for afib  AICD (Automatic Cardioverter/Defibrillator) Present: [employer] with 1 [employer] lead4/1/09- explanted and replaced with Medtronic 2 leads on 9/2/09    ====================ASSESSMENT ==============  Heart transplant 2/2018 for ACC/AHA stage D NICM   Supraventricular tachycardia  severe hypertension  Klebsiella oxytoca   Sepsis  Resolving acute respiratory failure   Hyperlactatemia acidosis  Leukopenia      Plan:  ====================== NEUROLOGY=====================  Nonfocal, continue to monitor neuro status    ==================== RESPIRATORY======================  Comfortable on RA   Encourage incentive spirometry, continue pulse ox monitoring, follow ABGs     ====================CARDIOVASCULAR==================  S/p heart transplant in 2018, c/w Prednisone, off cellcept while on high dose steroids  AICD present; Currently sinus tachycardic S/P IV Amiodarone for SVT.  Blood pressure control with Hydralazine   ASA / Statin for history of HLD and CAD prophylaxis    aspirin enteric coated 81 milliGRAM(s) Oral daily  hydrALAZINE 75 milliGRAM(s) Oral every 8 hours  pravastatin 20 milliGRAM(s) Oral at bedtime  predniSONE   Tablet 40 milliGRAM(s) Oral daily    ===================HEMATOLOGIC/ONC ===================  Anemia 2/2 GI bleed S/P single blood transfusion   Monitor H&H closely with serial CBC's.     enoxaparin Injectable 40 milliGRAM(s) SubCutaneous daily for VTE prophylaxis     ===================== RENAL =========================  Continue to monitor I/Os, BUN/Creatinine, and urine output.   Goal net even fluid balance. Replete lytes PRN. Keep K> 4 and Mg >2.     ==================== GASTROINTESTINAL===================  Tolerating consistent carb diet   Black tarry stools, capsule study (8/15), revealed stomach & upper SB lesions.  S/p balloon enteroscopy with GI yesterday     cholecalciferol 1000 Unit(s) Oral daily  dextrose 5%. 1000 milliLiter(s) (50 mL/Hr) IV Continuous <Continuous>  folic acid 1 milliGRAM(s) Oral daily  magnesium oxide 400 milliGRAM(s) Oral daily  pantoprazole  Injectable 40 milliGRAM(s) IV Push every 12 hours for GI prophylaxis   sodium bicarbonate 650 milliGRAM(s) Oral two times a day  sodium chloride 0.9% lock flush 3 milliLiter(s) IV Push every 8 hours  sodium chloride 0.9%. 1000 milliLiter(s) (10 mL/Hr) IV Continuous <Continuous>    =======================    ENDOCRINE  =====================  Hyperglycemia requiring Humalog sliding scale and lantus     insulin glargine Injectable (LANTUS) 14 Unit(s) SubCutaneous at bedtime  insulin lispro (HumaLOG) corrective regimen sliding scale   SubCutaneous three times a day before meals  insulin lispro Injectable (HumaLOG) 5 Unit(s) SubCutaneous three times a day with meals    ========================INFECTIOUS DISEASE================  Bacteremia and sepsis, requiring IV antibiotics--Klebsiella in blood, previously on Meropenem, changed to cefepime   Fungitell, moderately elevated in May and non detectable on more recent June admission; c/w Posaconazole   Urine and blood cultures from 8/14--NGTD.     cefepime   IVPB 1000 milliGRAM(s) IV Intermittent every 8 hours  posaconazole DR Tablet 300 milliGRAM(s) Oral daily      Patient requires continuous monitoring with bedside rhythm monitoring, pulse ox monitoring, and intermittent blood gas analysis. Care plan discussed with ICU care team. Patient remained critical and at risk for life threatening decompensation.     By signing my name below, I, Ronit Joe, attest that this documentation has been prepared under the direction and in the presence of Colette Mccabe NP   Electronically signed: Katty Gaffney, 08-18-20 @ 07:52    I, Colette Mccabe, personally performed the services described in this documentation. all medical record entries made by the ramuibkurt were at my direction and in my presence. 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 Patient was treated w/ prednisone and Rituximab. Tacro was discontinued and patient was also transitioned to everolimus  Admitted  6/16-6/19  for hyperglycemia.  Reported to transplant team having one done black tarry stool-  instructed to  present to [facility] for hemolytic anemia. Patient has been following with Hematology outpatient.  Denies CP  N/V diarrhea SOB. (11 Aug 2020 20:08)      Surgery/Hospital Course:  8/11 Admitted to [facility] with symptomatic anemia  8/13 EGD for investigation of tarry stools  8/15 SB capsule: stomach & SB lesions, likely ulcers.  8/17 balloon enteroscopy with GI    Today:    REVIEW OF SYSTEMS:  Gen: No fever  EYES/ENT: No visual changes;  No vertigo or throat pain   NECK: No pain   RES:  No shortness of breath or Cough  CARD: No chest pain   GI: No abdominal pain  : No dysuria  NEURO: No weakness  SKIN: No itching, rashes     ICU Vital Signs Last 24 Hrs  T(C): 36.1 (18 Aug 2020 04:00), Max: 36.9 (17 Aug 2020 08:00)  T(F): 97 (18 Aug 2020 04:00), Max: 98.4 (17 Aug 2020 08:00)  HR: 123 (18 Aug 2020 07:00) (103 - 128)  BP: --  BP(mean): --  ABP: 118/55 (18 Aug 2020 07:00) (118/55 - 185/89)  ABP(mean): 79 (18 Aug 2020 07:00) (79 - 120)  RR: 16 (18 Aug 2020 07:00) (12 - 38)  SpO2: 99% (18 Aug 2020 07:00) (71% - 100%)      Physical Exam:  Gen:  Awake, alert   CNS: non focal 	  Neck: no JVD  RES : clear , no wheezing              CVS: Sinus tachycardia. Normal S1/S2  Abd: Soft, mildly distended. Bowel sounds present. +tarry stools with no melena  Skin: No rash.  Ext:  no edema    ============================I/O===========================   I&O's Detail    17 Aug 2020 07:01  -  18 Aug 2020 07:00  --------------------------------------------------------  IN:    IV PiggyBack: 150 mL    Oral Fluid: 1140 mL    sodium chloride 0.9%.: 240 mL  Total IN: 1530 mL    OUT:    Voided: 1425 mL  Total OUT: 1425 mL    Total NET: 105 mL        ============================ LABS =========================                        8.4    2.59  )-----------( 155      ( 18 Aug 2020 00:33 )             26.1     08-18    138  |  104  |  24<H>  ----------------------------<  126<H>  4.1   |  23  |  1.06    Ca    8.5      18 Aug 2020 00:33  Phos  2.6     08-18  Mg     2.4     08-18    TPro  5.5<L>  /  Alb  3.1<L>  /  TBili  0.4  /  DBili  x   /  AST  32  /  ALT  28  /  AlkPhos  57  08-18    LIVER FUNCTIONS - ( 18 Aug 2020 00:33 )  Alb: 3.1 g/dL / Pro: 5.5 g/dL / ALK PHOS: 57 U/L / ALT: 28 U/L / AST: 32 U/L / GGT: x             ABG - ( 18 Aug 2020 00:16 )  pH, Arterial: 7.43  pH, Blood: x     /  pCO2: 39    /  pO2: 134   / HCO3: 26    / Base Excess: 1.7   /  SaO2: 99                  ======================Micro/Rad/Cardio=================  Culture: Reviewed   CXR: Reviewed  Echo:Reviewed  ======================================================  PAST MEDICAL & SURGICAL HISTORY:  H/O hemolytic anemia  H/O autoimmune hemolytic anemia  Knee pain, right  HLD (hyperlipidemia)  Former smoker  DVT of upper extremity (deep vein thrombosis)  Hepatitis C virus  GIB (gastrointestinal bleeding)  Ventricular fibrillation: s/p AICD  PAF (paroxysmal atrial fibrillation): on xarelto  Non-Ischemic Cardiomyopathy  SVT (Supraventricular Tachycardia)  HTN  CHF (Congestive Heart Failure)  S/P right heart catheterization: biopsy multiple  H/O heart transplant: 2/2018  Status post left hip replacement  History of Prior Ablation Treatment: for afib  AICD (Automatic Cardioverter/Defibrillator) Present: [employer] with 1 [employer] lead4/1/09- explanted and replaced with Medtronic 2 leads on 9/2/09    ====================ASSESSMENT ==============  Heart transplant 2/2018 for ACC/AHA stage D NICM   Supraventricular tachycardia  severe hypertension  Klebsiella oxytoca   Sepsis  Resolving acute respiratory failure   Hyperlactatemia acidosis  Leukopenia      Plan:  ====================== NEUROLOGY=====================  Nonfocal, continue to monitor neuro status    ==================== RESPIRATORY======================  Comfortable on RA   Encourage incentive spirometry, continue pulse ox monitoring, follow ABGs     ====================CARDIOVASCULAR==================  S/p heart transplant in 2018, c/w Prednisone, off cellcept while on high dose steroids  AICD present; Currently sinus tachycardic S/P IV Amiodarone for SVT.  Blood pressure control with Hydralazine   ASA / Statin for history of HLD and CAD prophylaxis    aspirin enteric coated 81 milliGRAM(s) Oral daily  hydrALAZINE 75 milliGRAM(s) Oral every 8 hours  pravastatin 20 milliGRAM(s) Oral at bedtime  predniSONE   Tablet 40 milliGRAM(s) Oral daily    ===================HEMATOLOGIC/ONC ===================  Anemia 2/2 GI bleed S/P single blood transfusion   Monitor H&H closely with serial CBC's.     enoxaparin Injectable 40 milliGRAM(s) SubCutaneous daily for VTE prophylaxis     ===================== RENAL =========================  Continue to monitor I/Os, BUN/Creatinine, and urine output.   Goal net even fluid balance. Replete lytes PRN. Keep K> 4 and Mg >2.     ==================== GASTROINTESTINAL===================  Tolerating consistent carb diet   Black tarry stools, capsule study (8/15), revealed stomach & upper SB lesions.  S/p balloon enteroscopy with GI yesterday     cholecalciferol 1000 Unit(s) Oral daily  dextrose 5%. 1000 milliLiter(s) (50 mL/Hr) IV Continuous <Continuous>  folic acid 1 milliGRAM(s) Oral daily  magnesium oxide 400 milliGRAM(s) Oral daily  pantoprazole  Injectable 40 milliGRAM(s) IV Push every 12 hours for GI prophylaxis   sodium bicarbonate 650 milliGRAM(s) Oral two times a day  sodium chloride 0.9% lock flush 3 milliLiter(s) IV Push every 8 hours  sodium chloride 0.9%. 1000 milliLiter(s) (10 mL/Hr) IV Continuous <Continuous>    =======================    ENDOCRINE  =====================  Hyperglycemia requiring Humalog sliding scale and lantus     insulin glargine Injectable (LANTUS) 14 Unit(s) SubCutaneous at bedtime  insulin lispro (HumaLOG) corrective regimen sliding scale   SubCutaneous three times a day before meals  insulin lispro Injectable (HumaLOG) 5 Unit(s) SubCutaneous three times a day with meals    ========================INFECTIOUS DISEASE================  Bacteremia and sepsis, requiring IV antibiotics--Klebsiella in blood, previously on Meropenem, changed to cefepime   Fungitell, moderately elevated in May and non detectable on more recent June admission; c/w Posaconazole   Urine and blood cultures from 8/14--NGTD.     cefepime   IVPB 1000 milliGRAM(s) IV Intermittent every 8 hours  posaconazole DR Tablet 300 milliGRAM(s) Oral daily      Patient requires continuous monitoring with bedside rhythm monitoring, pulse ox monitoring, and intermittent blood gas analysis. Care plan discussed with ICU care team. Patient remained critical and at risk for life threatening decompensation.     By signing my name below, I, Ronit Joe, attest that this documentation has been prepared under the direction and in the presence of Colette Mccabe NP   Electronically signed: Katty Gaffney, 08-18-20 @ 07:52    I, Colette Mccabe, personally performed the services described in this documentation. all medical record entries made by the ramuibkurt were at my direction and in my presence. 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 Patient was treated w/ prednisone and Rituximab. Tacro was discontinued and patient was also transitioned to everolimus  Admitted  6/16-6/19  for hyperglycemia.  Reported to transplant team having one done black tarry stool-  instructed to  present to [facility] for hemolytic anemia. Patient has been following with Hematology outpatient.  Denies CP  N/V diarrhea SOB. (11 Aug 2020 20:08)      Surgery/Hospital Course:  8/11 Admitted to [facility] with symptomatic anemia  8/13 EGD for investigation of tarry stools  8/15 SB capsule: stomach & SB lesions, likely ulcers.  8/17 balloon enteroscopy with GI    24 hour events:  No overnight events, 2BM (not melana), stable H/H  balloon enteroscopy with GI no acute findings  Valcyte and bactrim d/c'd 2/2 leukpenia, EBC increased today to 2.56 from 2.08    REVIEW OF SYSTEMS:  Gen: No fever  EYES/ENT: No visual changes;  No vertigo or throat pain   NECK: No pain   RES:  No shortness of breath or Cough  CARD: No chest pain   GI: No abdominal pain, c/o "fullness"  : No dysuria  NEURO: No weakness  SKIN: No itching, rashes     ICU Vital Signs Last 24 Hrs  T(C): 36.1 (18 Aug 2020 04:00), Max: 36.9 (17 Aug 2020 08:00)  T(F): 97 (18 Aug 2020 04:00), Max: 98.4 (17 Aug 2020 08:00)  HR: 123 (18 Aug 2020 07:00) (103 - 128)  BP: --  BP(mean): --  ABP: 118/55 (18 Aug 2020 07:00) (118/55 - 185/89)  ABP(mean): 79 (18 Aug 2020 07:00) (79 - 120)  RR: 16 (18 Aug 2020 07:00) (12 - 38)  SpO2: 99% (18 Aug 2020 07:00) (71% - 100%)      Physical Exam:  Gen:  Awake, alert, no9 focal deficits   CNS: non focal 	  Neck: no JVD  RES : clear , no wheezing              CVS: Sinus tachycardia. Normal S1/S2  Abd: Soft, mildly distended. Bowel sounds present. +tarry stools with no melena  Skin: No rash.  Ext:  no edema    ============================I/O===========================   I&O's Detail    17 Aug 2020 07:01  -  18 Aug 2020 07:00  --------------------------------------------------------  IN:    IV PiggyBack: 150 mL    Oral Fluid: 1140 mL    sodium chloride 0.9%.: 240 mL  Total IN: 1530 mL    OUT:    Voided: 1425 mL  Total OUT: 1425 mL    Total NET: 105 mL        ============================ LABS =========================                        8.4    2.59  )-----------( 155      ( 18 Aug 2020 00:33 )             26.1     08-18    138  |  104  |  24<H>  ----------------------------<  126<H>  4.1   |  23  |  1.06    Ca    8.5      18 Aug 2020 00:33  Phos  2.6     08-18  Mg     2.4     08-18    TPro  5.5<L>  /  Alb  3.1<L>  /  TBili  0.4  /  DBili  x   /  AST  32  /  ALT  28  /  AlkPhos  57  08-18    LIVER FUNCTIONS - ( 18 Aug 2020 00:33 )  Alb: 3.1 g/dL / Pro: 5.5 g/dL / ALK PHOS: 57 U/L / ALT: 28 U/L / AST: 32 U/L / GGT: x             ABG - ( 18 Aug 2020 00:16 )  pH, Arterial: 7.43  pH, Blood: x     /  pCO2: 39    /  pO2: 134   / HCO3: 26    / Base Excess: 1.7   /  SaO2: 99                  ======================Micro/Rad/Cardio=================  Culture: Reviewed   CXR: Reviewed  Echo:Reviewed  ======================================================  PAST MEDICAL & SURGICAL HISTORY:  H/O hemolytic anemia  H/O autoimmune hemolytic anemia  Knee pain, right  HLD (hyperlipidemia)  Former smoker  DVT of upper extremity (deep vein thrombosis)  Hepatitis C virus  GIB (gastrointestinal bleeding)  Ventricular fibrillation: s/p AICD  PAF (paroxysmal atrial fibrillation): on xarelto  Non-Ischemic Cardiomyopathy  SVT (Supraventricular Tachycardia)  HTN  CHF (Congestive Heart Failure)  S/P right heart catheterization: biopsy multiple  H/O heart transplant: 2/2018  Status post left hip replacement  History of Prior Ablation Treatment: for afib  AICD (Automatic Cardioverter/Defibrillator) Present: [employer] with 1 [employer] lead4/1/09- explanted and replaced with Medtronic 2 leads on 9/2/09    ====================ASSESSMENT ==============  Heart transplant 2/2018 for ACC/AHA stage D NICM   Supraventricular tachycardia  severe hypertension  Klebsiella oxytoca bactermia  Sepsis  acute respiratory failure, resolved  Hyperlactatemia acidosis, resolved  Leukopenia  Acute blood loss anemia, stable      Plan:  ====================== NEUROLOGY=====================  Nonfocal, continue to monitor neuro status  Mobilization    ==================== RESPIRATORY======================  Comfortable on RA   Encourage incentive spirometry, continue pulse ox monitoring, follow ABGs     ====================CARDIOVASCULAR==================  AICD present; Currently sinus tachycardic S/P IV Amiodarone for SVT.  Blood pressure control with Hydralazine   Statin for history of HLD and CAD prophylaxis    hydrALAZINE 75 milliGRAM(s) Oral every 8 hours  pravastatin 20 milliGRAM(s) Oral at bedtime    ======================TRANSPLANT=========================  S/p heart transplant in 2018, c/w Prednisone taper, off cellcept while on high dose steroids  -Everolimus 0.75mg BID, will follow levels periodically while hospitalized (true trough in lab). Goal 8-10.    predniSONE   Tablet 40 milliGRAM(s) Oral daily    ===================HEMATOLOGIC/ONC ===================  Anemia 2/2 GI bleed S/P single blood transfusion, remains stable (last tranfusion 8/14)  Monitor H&H closely with serial CBC's.     Leukopenia, Heme/Onc following, will ask them to comment on bone marrow suppression    enoxaparin Injectable 40 milliGRAM(s) SubCutaneous daily for VTE prophylaxis     ===================== RENAL =========================  Continue to monitor I/Os, BUN/Creatinine, and urine output.   Goal net even fluid balance. Replete lytes PRN. Keep K> 4 and Mg >2.   sodium bicarbonate 650 milliGRAM(s) Oral two times a day    ==================== GASTROINTESTINAL===================  Tolerating consistent carb diet   Will DC ASA per transplant team, given it was prophylactic    Per GI: s/p EGD with gastropathy and esophagitis. Biopsies taken. No melena at this time and hgb stable. Prior EGD/push enteroscopy negative, capsule report with lesion that was suspicion of ulcer but is likely scope trauma. Also with possible duodenal angioectasisas/erosions, and 1 small bowel vascular lesion possible erosion vs angiectasia. Recommendations: await path results, continue with PPI 40mg po daily, okay to continue with aspirin, no further GI interventions planned    cholecalciferol 1000 Unit(s) Oral daily  folic acid 1 milliGRAM(s) Oral daily  magnesium oxide 400 milliGRAM(s) Oral daily  pantoprazole  Injectable 40 milliGRAM(s) IV Push every 12 hours for GI prophylaxis       =======================    ENDOCRINE  =====================  Hyperglycemia requiring Humalog sliding scale and lantus     insulin glargine Injectable (LANTUS) 14 Unit(s) SubCutaneous at bedtime  insulin lispro (HumaLOG) corrective regimen sliding scale   SubCutaneous three times a day before meals  insulin lispro Injectable (HumaLOG) 5 Unit(s) SubCutaneous three times a day with meals    ========================INFECTIOUS DISEASE================  Klebsiella bacteremia, previously on Meropenem, changed to cefepime   Fungitell, moderately elevated in May and non detectable on more recent June admission; c/w Posaconazole   Urine and blood cultures from 8/14--NGTD.     cefepime   IVPB 1000 milliGRAM(s) IV Intermittent every 8 hours    -Valcyte and Bactrim SS d/c'd yesterday as these may induce myelosuppression; if results return concerning for CMV, will start tx meds  -posaconazole DR Tablet 300 milliGRAM(s) Oral daily for prophylaxis      DISPO:   DC femoral TLC and radial [place]. Transfer to [facility].       Patient requires continuous monitoring with bedside rhythm monitoring, pulse ox monitoring, and intermittent blood gas analysis. Care plan discussed with ICU care team. Patient remained critical and at risk for life threatening decompensation.     By signing my name below, I, Ronit Joe, attest that this documentation has been prepared under the direction and in the presence of Colette Mccabe NP   Electronically signed: Katty Gaffney, 08-18-20 @ 07:52    I, Colette Mccabe, personally performed the services described in this documentation. all medical record entries made by the ramuibkurt were at my direction and in my presence. I have reviewed the chart and agree that the record reflects my personal performance and is accurate and complete  Electronically signed: Colette Mccabe NP

## 2020-08-18 NOTE — PROGRESS NOTE ADULT - PROBLEM SELECTOR PLAN 2
S/p 3units pRBC this admission thus far (last transfusion 8/14)   - Stable H&H, no more tarry stools  -Appreciate GI recs; repeat EGD/push enteroscopy- gastritis with no active bleeding seen. F/U gastric biopsy r/o CMV  - ASA d'cd   -Appreciate heme recs, no signs of hemolysis at this time

## 2020-08-18 NOTE — PROGRESS NOTE ADULT - ATTENDING COMMENTS
no further bleeding. no PRBCs  endoscopy show: gastopathy and esophagitis. s/p gastric biopsy. angioectasia erosions in duodenum and one small bowel erosion.   meds: cefipime, posicoanzole. HDZN 75 Q8, statin, evero .75 bid, pred 40. off bactrim and valcyte since yesterday   140/-180/, 100, Afebrile                         8.4    2.59  )-----------( 155      ( 18 Aug 2020 00:33 )             26.1   WBC 2.0  81% neut  3.5 % lymph   CMV PCR pending.   clinically stable. no further bleeding.   note lymphopenia which has been present since ritoxan. however, patient is also leukopenic. will ask hem to give input.   Plan:  d/c ASA for now.  no change in immunosupressio. continue to hold bactrim/valcyte.   follow CMV PCR and gastric biopsy.   Marvin Campbell

## 2020-08-18 NOTE — PROGRESS NOTE ADULT - PROBLEM SELECTOR PLAN 1
-C/w ASA/statin for TCAD ppx  -C/w everolimus 0.75mg BID, will follow levels periodically while hospitalized (true trough in lab). Goal 8-10.  -C/w high dose PDN which is being weaned, however favor a much more aggressive taper schedule at this time  -C/w posaconazole for ppx  -Will d/c Valcyte and Bactrim SS as these may induce myelosuppression; if results return concerning for CMV, will start tx meds  -Off Cellcept while on high dose steroids. Will eventually need to start Cellcept as steroids weaned

## 2020-08-18 NOTE — PROGRESS NOTE ADULT - SUBJECTIVE AND OBJECTIVE BOX
Chief Complaint:  Patient is a 70y old  Male who presents with a chief complaint of SOB/anemia (18 Aug 2020 07:51)      Interval Events: Had two brown BMs overnight/this AM per nurse. Denies n/v, abdominal pain.    Allergies:  No Known Allergies      Hospital Medications:  aspirin enteric coated 81 milliGRAM(s) Oral daily  cefepime   IVPB 1000 milliGRAM(s) IV Intermittent every 8 hours  cholecalciferol 1000 Unit(s) Oral daily  dextrose 5%. 1000 milliLiter(s) IV Continuous <Continuous>  enoxaparin Injectable 40 milliGRAM(s) SubCutaneous daily  everolimus (ZORTRESS) 0.75 milliGRAM(s) Oral <User Schedule>  folic acid 1 milliGRAM(s) Oral daily  hydrALAZINE 75 milliGRAM(s) Oral every 8 hours  insulin glargine Injectable (LANTUS) 14 Unit(s) SubCutaneous at bedtime  insulin lispro (HumaLOG) corrective regimen sliding scale   SubCutaneous three times a day before meals  insulin lispro Injectable (HumaLOG) 5 Unit(s) SubCutaneous three times a day with meals  magnesium oxide 400 milliGRAM(s) Oral daily  omega-3-Acid Ethyl Esters 2 Gram(s) Oral two times a day  pantoprazole  Injectable 40 milliGRAM(s) IV Push every 12 hours  posaconazole DR Tablet 300 milliGRAM(s) Oral daily  pravastatin 20 milliGRAM(s) Oral at bedtime  predniSONE   Tablet 40 milliGRAM(s) Oral daily  sodium bicarbonate 650 milliGRAM(s) Oral two times a day  sodium chloride 0.9% lock flush 3 milliLiter(s) IV Push every 8 hours  sodium chloride 0.9%. 1000 milliLiter(s) IV Continuous <Continuous>      PMHX/PSHX:  H/O hemolytic anemia  H/O autoimmune hemolytic anemia  Knee pain, right  HLD (hyperlipidemia)  Former smoker  DVT of upper extremity (deep vein thrombosis)  Hepatitis C virus  GIB (gastrointestinal bleeding)  H/O prior ablation treatment  Ventricular fibrillation  PAF (paroxysmal atrial fibrillation)  Non-Ischemic Cardiomyopathy  SVT (Supraventricular Tachycardia)  HTN  CHF (Congestive Heart Failure)  S/P right heart catheterization  H/O heart transplant  LVAD (left ventricular assist device) present  Status post left hip replacement  Hypertension  Hypertension  History of Prior Ablation Treatment  AICD (Automatic Cardioverter/Defibrillator) Present      Family history:  No pertinent family history in first degree relatives      ROS: As per HPI, 14-point ROS negative otherwise.    General:  No wt loss, fevers, chills, night sweats, fatigue,   Eyes:  Good vision, no reported pain  ENT:  No sore throat, pain, runny nose, dysphagia  CV:  No pain, palpitations, hypo/hypertension  Resp:  No dyspnea, cough, tachypnea, wheezing  GI:  See HPI  :  No pain, bleeding, incontinence, nocturia  Muscle:  No pain, weakness  Neuro:  No weakness, tingling, memory problems  Psych:  No fatigue, insomnia, mood problems, depression  Endocrine:  No polyuria, polydipsia, cold/heat intolerance  Heme:  No petechiae, ecchymosis, easy bruisability  Skin:  No rash, edema      PHYSICAL EXAM:     Vital Signs:  Vital Signs Last 24 Hrs  T(C): 36.1 (18 Aug 2020 08:00), Max: 36.8 (17 Aug 2020 17:00)  T(F): 97 (18 Aug 2020 08:00), Max: 98.3 (18 Aug 2020 00:00)  HR: 129 (18 Aug 2020 09:27) (103 - 129)  BP: 129/97 (18 Aug 2020 09:27) (129/97 - 129/97)  BP(mean): 110 (18 Aug 2020 09:27) (110 - 110)  RR: 29 (18 Aug 2020 09:27) (12 - 48)  SpO2: 100% (18 Aug 2020 09:27) (95% - 100%)  Daily Height in cm: 170.18 (17 Aug 2020 14:29)    Daily Weight in k.9 (18 Aug 2020 00:00)    GENERAL: no acute distress  NEURO: alert, no asterixis  HEENT: anicteric sclera, no conjunctival pallor appreciated  CHEST: no respiratory distress, no accessory muscle use  CARDIAC: regular rate, rhythm  ABDOMEN: soft, non-tender, non-distended, no rebound or guarding  EXTREMITIES: warm, well perfused, no edema  SKIN: no lesions noted    LABS:                        8.4    2.59  )-----------( 155      ( 18 Aug 2020 00:33 )             26.1     08-    138  |  104  |  24<H>  ----------------------------<  126<H>  4.1   |  23  |  1.06    Ca    8.5      18 Aug 2020 00:33  Phos  2.6     08-18  Mg     2.4     08-18    TPro  5.5<L>  /  Alb  3.1<L>  /  TBili  0.4  /  DBili  x   /  AST  32  /  ALT  28  /  AlkPhos  57  08-18    LIVER FUNCTIONS - ( 18 Aug 2020 00:33 )  Alb: 3.1 g/dL / Pro: 5.5 g/dL / ALK PHOS: 57 U/L / ALT: 28 U/L / AST: 32 U/L / GGT: x                   Imaging:        < from: Upper Endoscopy (20 @ 14:20) >  Impression:          - LA Grade A esophagitis.                       - Acute gastritis. Biopsies taken to r/o CMV, HP given heart transplant                       - No ulceration seen despite finding on capsule endoscopy - likely 2/2 scope                        trauma that has since resolved.  Recommendation:      - Await pathology results.                       - Continue with IV PPI daily                       Trend CBC      Hold on additional endoscopic procedures at this time                                                                                                          < end of copied text >

## 2020-08-18 NOTE — PROGRESS NOTE ADULT - PROBLEM SELECTOR PLAN 2
-S/p 3units pRBC this admission thus far  -Appr GI input; repeat EGD/push enteroscopy largely unremarkable  -Will d/c ASA as merely prophylactic in setting of GIB  -Appreciate heme recs, no signs of hemolysis at this time  -Favor more aggressive taper schedule, as above

## 2020-08-18 NOTE — PROGRESS NOTE ADULT - ATTENDING COMMENTS
Patient seen and examined with Dr. Mckoy  I agree with his interval history exam and plans as noted above    Improving post gram negative sepsis with klebsiella   Etiology of infection remains unclear  Will continue Cefepime       Gary Lujan MD  [phone]  After 5pm/weekends [phone]

## 2020-08-18 NOTE — PROGRESS NOTE ADULT - ASSESSMENT
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 He has received a total of 3 units PRBC this admission with slow downtrend of hemoglobin. Labs were not consistent with hemolysis and he reported dark stools for which EGD/enteroscopy was performed which did not reveal source of bleeding but preliminary VCE showing possible gastric ulcer, duodenal angioectasia/erosions. He developed acute respiratory distress and profound sinus tachycardia on 8/13 in setting of Klebsiella bacteremia of unclear origin (preceded EGD) for which CT performed which suggests possible pneumonia. He has clinically improved with antibiotics and repeat EGD/push enteroscopy was largely unremarkable. He has a persistent leukopenia w/ e/o overall bone-marrow suppression.      Review of pertinent studies  8/2020 labs: Direct Jacky +, 4.6% reticulocytes with low RI, normal bilirubin,  (stable). Haptoglobin normal.   8/13: EGD/push enteroscopy unremarkable  8/16: VCE w/ possible gastric ulcer, duodenal angioectasias/erosions, and 1 small bowel angioectasia  8/17: EGD/push enteroscopy w/ angioectasias/erosions in small bowel. Gastropathy and esophagitis. Ulcer seen on VCE most likely scope trauma.

## 2020-08-18 NOTE — PROGRESS NOTE ADULT - ASSESSMENT
Impression:  #Melena: s/p EGD with gastropathy and esophagitis. Biopsies taken. No melena at this time and hgb stable. Prior EGD/push enteroscopy negative, capsule report with lesion that was suspicion of ulcer but is likely scope trauma. Also with possible duodenal angioectasisas/erosions, and 1 small bowel vascular lesion possible erosion vs angiectasia.  #Bacteremia  #Tachycardia/hypotension  #S/p OHT    Recommendations:  - await path results  - continue with PPI 40mg po daily  - okay to continue with aspirin  - no further GI interventions planned  - supportive care

## 2020-08-18 NOTE — PROGRESS NOTE ADULT - ASSESSMENT
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 Patient was treated w/ prednisone and Rituximab. Tacro was discontinued and patient was also transitioned to everolimus. Admitted again 6/16-6/19 for hyperglycemia. On 8/11 Reported to transplant team having one done black tarry stool-  instructed to present to [facility] for hemolytic anemia. Patient has been following with Hematology outpatient.  Denies CP  N/V diarrhea SOB. (11 Aug 2020 20:08)      Surgery/Hospital Course:  8/11 Admitted to [facility] with symptomatic anemia  8/13 EGD for investigation of tarry stools  8/14 CTU for SVT and respiratory distress   8/15 SB capsule: stomach & SB lesions, likely ulcers.  8/17 balloon enteroscopy with GI  8/18 VSS transferred back to floor.

## 2020-08-18 NOTE — PROGRESS NOTE ADULT - PROBLEM SELECTOR PLAN 3
-Await repeat diff to eval for neutropenia  -C/w cefepime, as below  -Hold Valcyte and Bactrim SS, as above  -Await CMV PCR and gastric bx  -Would recall hem/onc to comment on assessment of overall bone marrow suppression  -Check tick borne panel

## 2020-08-18 NOTE — PROGRESS NOTE ADULT - PROBLEM SELECTOR PLAN 4
Klebsiella bacteremia   Bcx 8/13 which have since cleared.   -ID recs appreciated  -Source remains unclear  -Isabel changed to cefepime yest 8/17 - based on cx sensitivities.

## 2020-08-18 NOTE — PROGRESS NOTE ADULT - PROBLEM SELECTOR PLAN 5
- Klebsiella bacteremia from 8/13 which have since cleared. CT with ? pneumonia and urine culture with > 3 organisms concerning for contaminant.   - ID recs appreciated  -Source remains unclear  -Isabel changed to cefepime yest based on cx sensitivities

## 2020-08-18 NOTE — PROGRESS NOTE ADULT - PROBLEM SELECTOR PLAN 3
Patient Leukopenic   F/U Heme recs   8/17 d/c Valcyte and Bactrim SS as these may induce myelosuppression  F/U CMV pcr and gastric biopsy for CMV r/o   F/U with Heme r/o bone marrow suppression

## 2020-08-18 NOTE — PROGRESS NOTE ADULT - SUBJECTIVE AND OBJECTIVE BOX
Patient seen sitting in chair comfortably c/o " my belly feels tight." Denies nausea, vomiting, or abdominal pain. +2BM with no tarry stools. Bowel sounds present x4 quadrants.     VITAL SIGNS    Telemetry:  Sinus tach 120-130's   Vital Signs Last 24 Hrs  T(C): 36.8 (20 @ 15:02), Max: 37.2 (20 @ 12:00)  T(F): 98.2 (20 @ 15:02), Max: 98.9 (20 @ 12:00)  HR: 121 (20 @ 15:02) (106 - 129)  BP: 134/- (20 @ 15:02) (129/89 - 154/72)  RR: 18 (20 @ 13:05) (12 - 48)  SpO2: 98% (20 @ 13:05) (95% - 100%)             @ 07:01  -   @ 07:00  --------------------------------------------------------  IN: 1530 mL / OUT: 1425 mL / NET: 105 mL     @ 07:01  -   @ 16:44  --------------------------------------------------------  IN: 0 mL / OUT: 300 mL / NET: -300 mL       Daily     Daily Weight in k.9 (18 Aug 2020 00:00)  Admit Wt: Drug Dosing Weight  Height (cm): 170.18 (17 Aug 2020 14:29)  Weight (kg): 75.2 (17 Aug 2020 14:29)  BMI (kg/m2): 26 (17 Aug 2020 14:29)  BSA (m2): 1.87 (17 Aug 2020 14:29)    Bilirubin Total, Serum: 0.4 mg/dL ( @ 00:33)    CAPILLARY BLOOD GLUCOSE      POCT Blood Glucose.: 215 mg/dL (18 Aug 2020 12:16)  POCT Blood Glucose.: 124 mg/dL (18 Aug 2020 08:08)          MEDICATIONS  cefepime   IVPB 1000 milliGRAM(s) IV Intermittent every 8 hours  cholecalciferol 1000 Unit(s) Oral daily  dextrose 5%. 1000 milliLiter(s) IV Continuous <Continuous>  enoxaparin Injectable 40 milliGRAM(s) SubCutaneous daily  everolimus (ZORTRESS) 0.75 milliGRAM(s) Oral <User Schedule>  folic acid 1 milliGRAM(s) Oral daily  hydrALAZINE 75 milliGRAM(s) Oral every 8 hours  insulin glargine Injectable (LANTUS) 14 Unit(s) SubCutaneous at bedtime  insulin lispro (HumaLOG) corrective regimen sliding scale   SubCutaneous three times a day before meals  insulin lispro Injectable (HumaLOG) 5 Unit(s) SubCutaneous three times a day with meals  magnesium oxide 400 milliGRAM(s) Oral daily  omega-3-Acid Ethyl Esters 2 Gram(s) Oral two times a day  pantoprazole  Injectable 40 milliGRAM(s) IV Push every 12 hours  posaconazole DR Tablet 300 milliGRAM(s) Oral daily  pravastatin 20 milliGRAM(s) Oral at bedtime  predniSONE   Tablet 40 milliGRAM(s) Oral daily  sodium bicarbonate 650 milliGRAM(s) Oral two times a day  sodium chloride 0.9% lock flush 3 milliLiter(s) IV Push every 8 hours      >>> <<<  PHYSICAL EXAM  Neurology: alert and oriented x 3, nonfocal, no gross deficits  CV : Sinus Tachy, S1, S2, no m/r/g  Lungs: CTA B/L, No wheezing, rales, rhonchi. Non labored respirations   Abdomen: soft, NT,ND, (+ )BM No Melena   :  voiding  Extremities: No LE edema bilaterally, +DP, No calf tenderness     LABS      138  |  104  |  24<H>  ----------------------------<  126<H>  4.1   |  23  |  1.06    Ca    8.5      18 Aug 2020 00:33  Phos  2.6     08-18  Mg     2.4     08-18    TPro  5.5<L>  /  Alb  3.1<L>  /  TBili  0.4  /  DBili  x   /  AST  32  /  ALT  28  /  AlkPhos  57  -18                                 8.4    2.59  )-----------( 155      ( 18 Aug 2020 00:33 )             26.1          PAST MEDICAL & SURGICAL HISTORY:  H/O hemolytic anemia  H/O autoimmune hemolytic anemia  Knee pain, right  HLD (hyperlipidemia)  Former smoker  DVT of upper extremity (deep vein thrombosis)  Hepatitis C virus  GIB (gastrointestinal bleeding)  Ventricular fibrillation: s/p AICD  PAF (paroxysmal atrial fibrillation): on xarelto  Non-Ischemic Cardiomyopathy  SVT (Supraventricular Tachycardia)  HTN  CHF (Congestive Heart Failure)  S/P right heart catheterization: biopsy multiple  H/O heart transplant: 2018  Status post left hip replacement  History of Prior Ablation Treatment: for afib  AICD (Automatic Cardioverter/Defibrillator) Present: [employer] with 1 [employer] lead09- explanted and replaced with [employer] 2 leads on 09

## 2020-08-18 NOTE — PROGRESS NOTE ADULT - ASSESSMENT
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 He has responded appropriately to a single blood transfusion. Of note, he recently has developed dark colored stools. Will investigate if anemia is due to GI bleeding in setting of steroid use or hemolysis and manage appropriately. Underwent UGI without bleeding source identified  Developed gram negative sepsis requiring transfer to CTU  Clinically improving    #Sepsis:  Klebsiella oxytoca growing in blood cultures 2/2 sets  Unclear source  Repeat blood cultures x2 sets 8/14 were negative  Klebsiella- sensitive to Cefepime  change Meropenem to Cefepime    #Anemia- r/o GI bleeding    EGD 8/17: esophagitis, acute gastritis s/p biopsy, no ulceration    #Pancytopenia- on admission  -Remains with significant leukopenia- especially lymphocyte lines  -Received by lab: CMV VL, Galactomann, COVID PCR, Tick panel  -Valcyte and Bactrim d/c'ed on 8/17 due to leukopenia  -Fungitell cancelled by lab, need to resend  -would repeat chest CT scan to see status of prior RUL scarring vs. 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 He has responded appropriately to a single blood transfusion. Of note, he recently has developed dark colored stools. Will investigate if anemia is due to GI bleeding in setting of steroid use or hemolysis and manage appropriately. Underwent UGI without bleeding source identified  Developed gram negative sepsis requiring transfer to CTU  Clinically improving    #Klebsiella oxytoca bacteremia:  - Growing in blood cultures 2/2 sets  - Unclear source  - Repeat blood cultures x2 sets 8/14 were negative  - Klebsiella- sensitive to Cefepime  - Meropenem changed to Cefepime    #OI prophylaxis-  -has been receiving high dose steroids since 5/20  -Follow up CMV viral load  -Valcyte and Bactrim d/c'ed on 8/17 due to leukopenia  -Need to do Atovaquone 1500mg daily for PCP ppx given on steroid    #Pancytopenia- on admission  -Remains with significant leukopenia- especially lymphocyte lines  -Received by lab: CMV VL, COVID PCR, Tick panel  -Galactomann<0.5  -would repeat chest CT scan to see status of prior RUL scarring vs. nodule once patient is stable    #Fungitell  -moderately elevated in May and non detectable on more recent June admission  -cancelled by lab, need to resend  -continue Posaconazole  -repeat chest CT non contrast    #Anemia- r/o GI bleeding    EGD 8/17: esophagitis, acute gastritis s/p biopsy, no ulceration  Consider PPI or H2b    Lobo Mckoy MD, PGY4   ID fellow  Pager: [phone]  After 5pm/weekends call [phone]    d/w Dr. Lujan

## 2020-08-19 LAB
ANION GAP SERPL CALC-SCNC: 10 MMOL/L — SIGNIFICANT CHANGE UP (ref 5–17)
BUN SERPL-MCNC: 28 MG/DL — HIGH (ref 7–23)
CALCIUM SERPL-MCNC: 8.3 MG/DL — LOW (ref 8.4–10.5)
CHLORIDE SERPL-SCNC: 105 MMOL/L — SIGNIFICANT CHANGE UP (ref 96–108)
CO2 SERPL-SCNC: 23 MMOL/L — SIGNIFICANT CHANGE UP (ref 22–31)
CREAT SERPL-MCNC: 1.11 MG/DL — SIGNIFICANT CHANGE UP (ref 0.5–1.3)
CULTURE RESULTS: SIGNIFICANT CHANGE UP
CULTURE RESULTS: SIGNIFICANT CHANGE UP
FUNGITELL: <31 PG/ML — SIGNIFICANT CHANGE UP
GLUCOSE BLDC GLUCOMTR-MCNC: 105 MG/DL — HIGH (ref 70–99)
GLUCOSE BLDC GLUCOMTR-MCNC: 125 MG/DL — HIGH (ref 70–99)
GLUCOSE BLDC GLUCOMTR-MCNC: 180 MG/DL — HIGH (ref 70–99)
GLUCOSE BLDC GLUCOMTR-MCNC: 220 MG/DL — HIGH (ref 70–99)
GLUCOSE SERPL-MCNC: 135 MG/DL — HIGH (ref 70–99)
HCT VFR BLD CALC: 24.5 % — LOW (ref 39–50)
HGB BLD-MCNC: 8.8 G/DL — LOW (ref 13–17)
MCHC RBC-ENTMCNC: 35.2 PG — HIGH (ref 27–34)
MCHC RBC-ENTMCNC: 35.9 GM/DL — SIGNIFICANT CHANGE UP (ref 32–36)
MCV RBC AUTO: 98 FL — SIGNIFICANT CHANGE UP (ref 80–100)
NRBC # BLD: 0 /100 WBCS — SIGNIFICANT CHANGE UP (ref 0–0)
PLATELET # BLD AUTO: 167 K/UL — SIGNIFICANT CHANGE UP (ref 150–400)
POTASSIUM SERPL-MCNC: 3.4 MMOL/L — LOW (ref 3.5–5.3)
POTASSIUM SERPL-SCNC: 3.4 MMOL/L — LOW (ref 3.5–5.3)
RBC # BLD: 2.5 M/UL — LOW (ref 4.2–5.8)
RBC # FLD: 18.5 % — HIGH (ref 10.3–14.5)
SODIUM SERPL-SCNC: 138 MMOL/L — SIGNIFICANT CHANGE UP (ref 135–145)
SPECIMEN SOURCE: SIGNIFICANT CHANGE UP
SPECIMEN SOURCE: SIGNIFICANT CHANGE UP
WBC # BLD: 3 K/UL — LOW (ref 3.8–10.5)
WBC # FLD AUTO: 3 K/UL — LOW (ref 3.8–10.5)

## 2020-08-19 PROCEDURE — 99233 SBSQ HOSP IP/OBS HIGH 50: CPT

## 2020-08-19 PROCEDURE — 93306 TTE W/DOPPLER COMPLETE: CPT | Mod: 26

## 2020-08-19 PROCEDURE — 99232 SBSQ HOSP IP/OBS MODERATE 35: CPT

## 2020-08-19 RX ORDER — ATOVAQUONE 750 MG/5ML
1500 SUSPENSION ORAL DAILY
Refills: 0 | Status: DISCONTINUED | OUTPATIENT
Start: 2020-08-19 | End: 2020-08-28

## 2020-08-19 RX ORDER — ACETAMINOPHEN 500 MG
650 TABLET ORAL EVERY 6 HOURS
Refills: 0 | Status: DISCONTINUED | OUTPATIENT
Start: 2020-08-19 | End: 2020-08-27

## 2020-08-19 RX ORDER — POTASSIUM BICARBONATE 978 MG/1
25 TABLET, EFFERVESCENT ORAL
Refills: 0 | Status: COMPLETED | OUTPATIENT
Start: 2020-08-19 | End: 2020-08-19

## 2020-08-19 RX ADMIN — Medication 1 MILLIGRAM(S): at 11:35

## 2020-08-19 RX ADMIN — Medication 75 MILLIGRAM(S): at 21:40

## 2020-08-19 RX ADMIN — Medication 5 UNIT(S): at 17:09

## 2020-08-19 RX ADMIN — POTASSIUM BICARBONATE 25 MILLIEQUIVALENT(S): 978 TABLET, EFFERVESCENT ORAL at 13:19

## 2020-08-19 RX ADMIN — PANTOPRAZOLE SODIUM 40 MILLIGRAM(S): 20 TABLET, DELAYED RELEASE ORAL at 05:31

## 2020-08-19 RX ADMIN — PANTOPRAZOLE SODIUM 40 MILLIGRAM(S): 20 TABLET, DELAYED RELEASE ORAL at 17:11

## 2020-08-19 RX ADMIN — EVEROLIMUS 0.75 MILLIGRAM(S): 10 TABLET ORAL at 08:02

## 2020-08-19 RX ADMIN — Medication 1000 UNIT(S): at 11:35

## 2020-08-19 RX ADMIN — Medication 2 GRAM(S): at 17:11

## 2020-08-19 RX ADMIN — SODIUM CHLORIDE 3 MILLILITER(S): 9 INJECTION INTRAMUSCULAR; INTRAVENOUS; SUBCUTANEOUS at 05:28

## 2020-08-19 RX ADMIN — Medication 4: at 17:09

## 2020-08-19 RX ADMIN — Medication 20 MILLIGRAM(S): at 21:40

## 2020-08-19 RX ADMIN — Medication 5 UNIT(S): at 08:01

## 2020-08-19 RX ADMIN — POTASSIUM BICARBONATE 25 MILLIEQUIVALENT(S): 978 TABLET, EFFERVESCENT ORAL at 14:49

## 2020-08-19 RX ADMIN — Medication 650 MILLIGRAM(S): at 17:11

## 2020-08-19 RX ADMIN — Medication 75 MILLIGRAM(S): at 05:29

## 2020-08-19 RX ADMIN — ENOXAPARIN SODIUM 40 MILLIGRAM(S): 100 INJECTION SUBCUTANEOUS at 11:34

## 2020-08-19 RX ADMIN — Medication 2: at 12:05

## 2020-08-19 RX ADMIN — EVEROLIMUS 0.75 MILLIGRAM(S): 10 TABLET ORAL at 20:03

## 2020-08-19 RX ADMIN — Medication 5 UNIT(S): at 12:06

## 2020-08-19 RX ADMIN — Medication 75 MILLIGRAM(S): at 13:18

## 2020-08-19 RX ADMIN — Medication 2 GRAM(S): at 05:31

## 2020-08-19 RX ADMIN — Medication 40 MILLIGRAM(S): at 05:28

## 2020-08-19 RX ADMIN — MAGNESIUM OXIDE 400 MG ORAL TABLET 400 MILLIGRAM(S): 241.3 TABLET ORAL at 11:35

## 2020-08-19 RX ADMIN — CEFEPIME 100 MILLIGRAM(S): 1 INJECTION, POWDER, FOR SOLUTION INTRAMUSCULAR; INTRAVENOUS at 13:17

## 2020-08-19 RX ADMIN — CEFEPIME 100 MILLIGRAM(S): 1 INJECTION, POWDER, FOR SOLUTION INTRAMUSCULAR; INTRAVENOUS at 05:31

## 2020-08-19 RX ADMIN — CEFEPIME 100 MILLIGRAM(S): 1 INJECTION, POWDER, FOR SOLUTION INTRAMUSCULAR; INTRAVENOUS at 21:40

## 2020-08-19 RX ADMIN — SODIUM CHLORIDE 3 MILLILITER(S): 9 INJECTION INTRAMUSCULAR; INTRAVENOUS; SUBCUTANEOUS at 22:48

## 2020-08-19 RX ADMIN — Medication 650 MILLIGRAM(S): at 05:31

## 2020-08-19 RX ADMIN — POSACONAZOLE 300 MILLIGRAM(S): 100 TABLET, DELAYED RELEASE ORAL at 11:35

## 2020-08-19 RX ADMIN — SODIUM CHLORIDE 3 MILLILITER(S): 9 INJECTION INTRAMUSCULAR; INTRAVENOUS; SUBCUTANEOUS at 14:50

## 2020-08-19 RX ADMIN — INSULIN GLARGINE 14 UNIT(S): 100 INJECTION, SOLUTION SUBCUTANEOUS at 21:41

## 2020-08-19 RX ADMIN — POTASSIUM BICARBONATE 25 MILLIEQUIVALENT(S): 978 TABLET, EFFERVESCENT ORAL at 11:34

## 2020-08-19 RX ADMIN — ATOVAQUONE 1500 MILLIGRAM(S): 750 SUSPENSION ORAL at 20:07

## 2020-08-19 NOTE — PROGRESS NOTE ADULT - SUBJECTIVE AND OBJECTIVE BOX
Patient seen and examined at bedside.    Overnight Events:     Review Of Systems: No chest pain, shortness of breath, or palpitations            Current Meds:  cefepime   IVPB 1000 milliGRAM(s) IV Intermittent every 8 hours  cholecalciferol 1000 Unit(s) Oral daily  dextrose 5%. 1000 milliLiter(s) IV Continuous <Continuous>  enoxaparin Injectable 40 milliGRAM(s) SubCutaneous daily  everolimus (ZORTRESS) 0.75 milliGRAM(s) Oral <User Schedule>  folic acid 1 milliGRAM(s) Oral daily  hydrALAZINE 75 milliGRAM(s) Oral every 8 hours  insulin glargine Injectable (LANTUS) 14 Unit(s) SubCutaneous at bedtime  insulin lispro (HumaLOG) corrective regimen sliding scale   SubCutaneous three times a day before meals  insulin lispro Injectable (HumaLOG) 5 Unit(s) SubCutaneous three times a day with meals  magnesium oxide 400 milliGRAM(s) Oral daily  omega-3-Acid Ethyl Esters 2 Gram(s) Oral two times a day  pantoprazole  Injectable 40 milliGRAM(s) IV Push every 12 hours  posaconazole DR Tablet 300 milliGRAM(s) Oral daily  pravastatin 20 milliGRAM(s) Oral at bedtime  predniSONE   Tablet 40 milliGRAM(s) Oral daily  sodium bicarbonate 650 milliGRAM(s) Oral two times a day  sodium chloride 0.9% lock flush 3 milliLiter(s) IV Push every 8 hours      Vitals:  T(F): 98.4 (08-19), Max: 98.9 (08-18)  HR: 126 (08-19) (114 - 130)  BP: 121/85 (08-19) (121/85 - 158/79)  RR: 18 (08-19)  SpO2: 94% (08-19)  I&O's Summary    18 Aug 2020 07:01  -  19 Aug 2020 07:00  --------------------------------------------------------  IN: 620 mL / OUT: 1200 mL / NET: -580 mL        Physical Exam:  Gen: NAD.  HEENT: NCAT. PERRLA b/l.  Neck: No JVP elev.  CV: Normal S1, S2. RRR. No MRG.  Chest: CTAB. No WRR.  Abd: +BSx4. Soft. NTND.  Ext: No LE edema.  Skin: No cyanosis.                          8.8    3.00  )-----------( 167      ( 19 Aug 2020 06:01 )             24.5     08-19    138  |  105  |  28<H>  ----------------------------<  135<H>  3.4<L>   |  23  |  1.11    Ca    8.3<L>      19 Aug 2020 06:01  Phos  2.6     08-18  Mg     2.4     08-18    TPro  5.5<L>  /  Alb  3.1<L>  /  TBili  0.4  /  DBili  x   /  AST  32  /  ALT  28  /  AlkPhos  57  08-18      CARDIAC MARKERS ( 13 Aug 2020 23:51 )  36 ng/L / x     / x     / 61 U/L / x     / 2.7 ng/mL  CARDIAC MARKERS ( 13 Aug 2020 22:53 )  25 ng/L / x     / x     / 78 U/L / x     / 3.0 ng/mL              New ECG(s): Personally reviewed    Echo:    Stress Testing:     Cath:    Imaging:    Interpretation of Telemetry: Patient seen and examined at bedside.    Overnight Events: NAEO. Pt reports some abd bloating today. Otherwise, denies any melena/hematochezia.    Review Of Systems: No chest pain, shortness of breath, or palpitations            Current Meds:  cefepime   IVPB 1000 milliGRAM(s) IV Intermittent every 8 hours  cholecalciferol 1000 Unit(s) Oral daily  dextrose 5%. 1000 milliLiter(s) IV Continuous <Continuous>  enoxaparin Injectable 40 milliGRAM(s) SubCutaneous daily  everolimus (ZORTRESS) 0.75 milliGRAM(s) Oral <User Schedule>  folic acid 1 milliGRAM(s) Oral daily  hydrALAZINE 75 milliGRAM(s) Oral every 8 hours  insulin glargine Injectable (LANTUS) 14 Unit(s) SubCutaneous at bedtime  insulin lispro (HumaLOG) corrective regimen sliding scale   SubCutaneous three times a day before meals  insulin lispro Injectable (HumaLOG) 5 Unit(s) SubCutaneous three times a day with meals  magnesium oxide 400 milliGRAM(s) Oral daily  omega-3-Acid Ethyl Esters 2 Gram(s) Oral two times a day  pantoprazole  Injectable 40 milliGRAM(s) IV Push every 12 hours  posaconazole DR Tablet 300 milliGRAM(s) Oral daily  pravastatin 20 milliGRAM(s) Oral at bedtime  predniSONE   Tablet 40 milliGRAM(s) Oral daily  sodium bicarbonate 650 milliGRAM(s) Oral two times a day  sodium chloride 0.9% lock flush 3 milliLiter(s) IV Push every 8 hours      Vitals:  T(F): 98.4 (08-19), Max: 98.9 (08-18)  HR: 126 (08-19) (114 - 130)  BP: 121/85 (08-19) (121/85 - 158/79)  RR: 18 (08-19)  SpO2: 94% (08-19)  I&O's Summary    18 Aug 2020 07:01  -  19 Aug 2020 07:00  --------------------------------------------------------  IN: 620 mL / OUT: 1200 mL / NET: -580 mL        Physical Exam:  Gen: NAD.  HEENT: NCAT. PERRLA b/l.  Neck: No JVP elev.  CV: Normal S1, S2. RRR. No MRG.  Chest: CTAB. No WRR.  Abd: +BSx4. Soft. NTND.  Ext: No LE edema.  Skin: No cyanosis.                          8.8    3.00  )-----------( 167      ( 19 Aug 2020 06:01 )             24.5     08-19    138  |  105  |  28<H>  ----------------------------<  135<H>  3.4<L>   |  23  |  1.11    Ca    8.3<L>      19 Aug 2020 06:01  Phos  2.6     08-18  Mg     2.4     08-18    TPro  5.5<L>  /  Alb  3.1<L>  /  TBili  0.4  /  DBili  x   /  AST  32  /  ALT  28  /  AlkPhos  57  08-18      CARDIAC MARKERS ( 13 Aug 2020 23:51 )  36 ng/L / x     / x     / 61 U/L / x     / 2.7 ng/mL  CARDIAC MARKERS ( 13 Aug 2020 22:53 )  25 ng/L / x     / x     / 78 U/L / x     / 3.0 ng/mL

## 2020-08-19 NOTE — PROGRESS NOTE ADULT - PROBLEM SELECTOR PLAN 1
Heart Transplant Team Following: f/u recs   -C/w everolimus 0.75mg BID (level Goal 8-10)   -C/w Prednisone taper   -C/w posaconazole for ppx  -Off Cellcept while on high dose steroids. Will eventually need to start Cellcept as steroids weaned. Heart Transplant Team Following: f/u recs   -C/w everolimus 0.75mg BID (level Goal 8-10)   -C/w Prednisone taper   -C/w posaconazole for ppx  -Continue to hold bactrim/valcyte.   -Off Cellcept while on high dose steroids. Will eventually need to start Cellcept as steroids weaned.

## 2020-08-19 NOTE — PROGRESS NOTE ADULT - PROBLEM SELECTOR PLAN 1
-C/w statin for TCAD ppx  -ASA on hold given GIB  -C/w everolimus 0.75mg BID, will follow levels periodically while hospitalized (true trough in lab). Goal 8-10.  -Continue high dose PDN which is being weaned as below   -C/w posiconazole for ppx; Bactrim and Valcyte on hold given leukopenia  -Off cellcept while on high dose steroids. Will eventually need to start Cellcept as steroids weaned  -Repeat TTE now given persistent sinus tachycardia

## 2020-08-19 NOTE — PROGRESS NOTE ADULT - PROBLEM SELECTOR PLAN 5
-Klebsiella bacteremia from 8/13 which have since cleared. CT with ? pneumonia and urine culture with > 3 organisms concerning for contaminant.   -ID recs appreciated  -C/w cefepime; d/w ID duration of abxs and poss change to daily vs PO regimen as would like to d/c soon

## 2020-08-19 NOTE — PHARMACOTHERAPY INTERVENTION NOTE - COMMENTS
Recommended narrowing from cefepime to ceftriaxone for Klebsiella bacteremia.    Florecita Castrejon, PharmD  Clinical Pharmacist, Infectious Diseases  [phone]

## 2020-08-19 NOTE — PROGRESS NOTE ADULT - ATTENDING COMMENTS
Patient seen and examined with Dr. Mckoy    I agree with his interval history exam and plans as noted above    Feeling stronger  no  new complaints  Klebsiella is sensitive to Ceftriaxone- can change to Ceftriaxone 2 grams/day    Most recent fungitell is negative- will plan to discontinue posaconazole after discussing immunosuppression preservation startegy with Cardiology team    Gary Lujan MD  [phone]  After 5pm/weekends [phone]

## 2020-08-19 NOTE — PROGRESS NOTE ADULT - SUBJECTIVE AND OBJECTIVE BOX
INFECTIOUS DISEASES FOLLOW UP    This is a follow up note for this  70y Male with  Leukopenia  K.oxytoca bacteremia  Post OHT in 02/2018  On prednisone    Interval event:  - Still no Atovaquone order for PCP ppx  - COVID-19 PCR negative  - BCx(8/14): no growth final  - WBC=3 today    ROS:  CONSTITUTIONAL:  No fever, good appetite  CARDIOVASCULAR:  No chest pain or palpitations  RESPIRATORY:  No dyspnea  GASTROINTESTINAL:  No nausea, vomiting, diarrhea, or abdominal pain  GENITOURINARY:  No dysuria  NEUROLOGIC:  No headache,     Allergies  No Known Allergies    ANTIMICROBIALS:    cefepime   IVPB 1000 every 8 hours  posaconazole DR Tablet 300 daily    OTHER MEDS:  MEDICATIONS  (STANDING):  acetaminophen   Tablet .. 650 every 6 hours PRN  enoxaparin Injectable 40 daily  everolimus (ZORTRESS) 0.75 <User Schedule>  hydrALAZINE 75 every 8 hours  insulin glargine Injectable (LANTUS) 14 at bedtime  insulin lispro (HumaLOG) corrective regimen sliding scale  three times a day before meals  insulin lispro Injectable (HumaLOG) 5 three times a day with meals  pantoprazole  Injectable 40 every 12 hours  pravastatin 20 at bedtime  predniSONE   Tablet 40 daily    Vital Signs Last 24 Hrs  T(F): 98 (08-19-20 @ 12:46), Max: 99.9 (08-14-20 @ 00:00)    Vital Signs Last 24 Hrs  HR: 128 (08-19-20 @ 12:46) (121 - 130)  BP: 116/77 (08-19-20 @ 12:46) (116/77 - 158/79)  RR: 18 (08-19-20 @ 12:46)  SpO2: 98% (08-19-20 @ 12:46) (94% - 98%)  Wt(kg): --    PHYSICAL EXAM:  Constitutional:no acute distress  Eyes:WALT, EOMI  Ear/Nose/Throat: no oral lesions, 	  Respiratory: clear BL sternotomy healed  Cardiovascular: S1S2  Gastrointestinal:soft, (+) BS, no tenderness  Extremities:no e/e/c  No Lymphadenopathy  IV sites not inflammed.    Labs:                        8.8    3.00  )-----------( 167      ( 19 Aug 2020 06:01 )             24.5     08-19    138  |  105  |  28<H>  ----------------------------<  135<H>  3.4<L>   |  23  |  1.11    Ca    8.3<L>      19 Aug 2020 06:01  Phos  2.6     08-18  Mg     2.4     08-18    TPro  5.5<L>  /  Alb  3.1<L>  /  TBili  0.4  /  DBili  x   /  AST  32  /  ALT  28  /  AlkPhos  57  08-18      Auto Eosinophil %: 0.9 % (08-17-20 @ 12:50)      WBC Trend:  WBC Count: 3.00 (08-19-20 @ 06:01)  WBC Count: 2.59 (08-18-20 @ 00:33)  WBC Count: 2.08 (08-17-20 @ 12:50)  WBC Count: 1.81 (08-17-20 @ 00:45)  WBC Count: 2.84 (08-16-20 @ 01:03)  WBC Count: 3.71 (08-15-20 @ 01:25)      Creatine Trend:  Creatinine, Serum: 1.11 (08-19)  Creatinine, Serum: 1.06 (08-18)  Creatinine, Serum: 1.14 (08-17)  Creatinine, Serum: 1.40 (08-16)  Creatinine, Serum: 1.32 (08-15)  Creatinine, Serum: 1.37 (08-13)  Creatinine, Serum: 1.07 (08-13)  Creatinine, Serum: 1.26 (08-13)      Liver Biochemical Testing Trend:  Alanine Aminotransferase (ALT/SGPT): 28 (08-18)  Aspartate Aminotransferase (AST/SGOT): 32 (08-18-20 @ 00:33)  Bilirubin Total, Serum: 0.4 (08-18)    MICROBIOLOGY:    Culture - Urine (collected 14 Aug 2020 13:32)  Source: .Urine Clean Catch (Midstream)  Final Report (15 Aug 2020 11:59):    No growth    Culture - Blood (collected 14 Aug 2020 01:32)  Source: .Blood Blood  Preliminary Report (15 Aug 2020 02:02):    No growth    Culture - Blood (collected 14 Aug 2020 01:32)  Source: .Blood Blood  Preliminary Report (15 Aug 2020 02:02):    No growth    Culture - Blood (collected 13 Aug 2020 14:48)  Source: .Blood Blood-Peripheral  Gram Stain (14 Aug 2020 02:20):    Growth in anaerobic bottle: Gram Negative Rods    Growth in aerobic bottle: Gram Negative Rods  Final Report (15 Aug 2020 17:30):    Growth in aerobic and anaerobic bottles: Klebsiella oxytoca/Raoutella    ornithinolytica    See previous culture [id number]    Culture - Blood in AM (08.13.20 @ 12:14)    Gram Stain:   Growth in anaerobic bottle: Gram Negative Rods    -  Levofloxacin: S <=0.5    -  Meropenem: S <=1    -  Klebsiella oxytoca: Detec    -  Ampicillin/Sulbactam: I 16/8 Enterobacter, Citrobacter, and Serratia may develop resistance during prolonged therapy (3-4 days)    -  Ceftriaxone: S <=1 Enterobacter, Citrobacter, and Serratia may develop resistance during prolonged therapy    -  Aztreonam: S <=4    -  Ciprofloxacin: S <=0.25    -  Ertapenem: S <=0.5    -  Imipenem: S <=1    -  Cefepime: S <=2    -  Piperacillin/Tazobactam: S <=8    -  Tobramycin: S <=2    -  Amikacin: S <=16    -  Ampicillin: R >16 These ampicillin results predict results for amoxicillin    -  Gentamicin: S <=2    -  Cefoxitin: S <=8    -  Cefazolin: R >16 Enterobacter, Citrobacter, and Serratia may develop resistance during prolonged therapy (3-4 days)    -  Trimethoprim/Sulfamethoxazole: R >2/38    Specimen Source: .Blood Blood-Peripheral    Organism: Blood Culture PCR    Organism: Klebsiella oxytoca /Raoutella ornithinolytica    Culture Results:   Growth in anaerobic bottle: Klebsiella oxytoca/Raoutella ornithinolytica  "Due to technical problems, Proteus sp. will Not be reported as part of  the BCID panel until further notice"  ***Blood Panel PCR results on this specimen are available  approximately 3 hours after the Gram stain result.***  Gram stain, PCR, and/or culture results may not always  correspond due to difference in methodologies.    Aspergillus Galactomannan Antigen (08.14.20 @ 23:30)    Aspergillus Galactomannan Antigen: <0.500    RADIOLOGY & ADDITIONAL STUDIES:    < from: Xray Chest 1 View- PORTABLE-Routine (08.17.20 @ 02:42) >  FINDINGS/  impression:  The size of the cardiomediastinal silhouette cannot be accurately assessed in this single projection.  There are no focal pulmonary consolidations.    Elevated right hemidiaphragm. Small right pleural effusion and right basilar atelectasis. Hazy opacities throughout the left lung.    < end of copied text > INFECTIOUS DISEASES FOLLOW UP    This is a follow up note for this  70y Male with  Leukopenia  K.oxytoca bacteremia  Post OHT in 02/2018  On prednisone    Interval event:  - Still no Atovaquone order for PCP ppx  - COVID-19 PCR negative  - BCx(8/14): no growth final  - WBC=3 today  - Fungitell<31  - Told primary team to put Atovaquone 1500mg daily for PCP ppx    ROS:  CONSTITUTIONAL:  No fever, good appetite  CARDIOVASCULAR:  No chest pain or palpitations  RESPIRATORY:  No dyspnea  GASTROINTESTINAL:  No nausea, vomiting, diarrhea, or abdominal pain  GENITOURINARY:  No dysuria  NEUROLOGIC:  No headache,     Allergies  No Known Allergies    ANTIMICROBIALS:    cefepime   IVPB 1000 every 8 hours  posaconazole DR Tablet 300 daily    OTHER MEDS:  MEDICATIONS  (STANDING):  acetaminophen   Tablet .. 650 every 6 hours PRN  enoxaparin Injectable 40 daily  everolimus (ZORTRESS) 0.75 <User Schedule>  hydrALAZINE 75 every 8 hours  insulin glargine Injectable (LANTUS) 14 at bedtime  insulin lispro (HumaLOG) corrective regimen sliding scale  three times a day before meals  insulin lispro Injectable (HumaLOG) 5 three times a day with meals  pantoprazole  Injectable 40 every 12 hours  pravastatin 20 at bedtime  predniSONE   Tablet 40 daily    Vital Signs Last 24 Hrs  T(F): 98 (08-19-20 @ 12:46), Max: 99.9 (08-14-20 @ 00:00)    Vital Signs Last 24 Hrs  HR: 128 (08-19-20 @ 12:46) (121 - 130)  BP: 116/77 (08-19-20 @ 12:46) (116/77 - 158/79)  RR: 18 (08-19-20 @ 12:46)  SpO2: 98% (08-19-20 @ 12:46) (94% - 98%)  Wt(kg): --    PHYSICAL EXAM:  Constitutional:no acute distress  Eyes:WALT, EOMI  Ear/Nose/Throat: no oral lesions, 	  Respiratory: clear BL sternotomy healed  Cardiovascular: S1S2  Gastrointestinal:soft, (+) BS, no tenderness  Extremities:no e/e/c  No Lymphadenopathy  IV sites not inflammed.    Labs:                        8.8    3.00  )-----------( 167      ( 19 Aug 2020 06:01 )             24.5     08-19    138  |  105  |  28<H>  ----------------------------<  135<H>  3.4<L>   |  23  |  1.11    Ca    8.3<L>      19 Aug 2020 06:01  Phos  2.6     08-18  Mg     2.4     08-18    TPro  5.5<L>  /  Alb  3.1<L>  /  TBili  0.4  /  DBili  x   /  AST  32  /  ALT  28  /  AlkPhos  57  08-18      Auto Eosinophil %: 0.9 % (08-17-20 @ 12:50)      WBC Trend:  WBC Count: 3.00 (08-19-20 @ 06:01)  WBC Count: 2.59 (08-18-20 @ 00:33)  WBC Count: 2.08 (08-17-20 @ 12:50)  WBC Count: 1.81 (08-17-20 @ 00:45)  WBC Count: 2.84 (08-16-20 @ 01:03)  WBC Count: 3.71 (08-15-20 @ 01:25)      Creatine Trend:  Creatinine, Serum: 1.11 (08-19)  Creatinine, Serum: 1.06 (08-18)  Creatinine, Serum: 1.14 (08-17)  Creatinine, Serum: 1.40 (08-16)  Creatinine, Serum: 1.32 (08-15)  Creatinine, Serum: 1.37 (08-13)  Creatinine, Serum: 1.07 (08-13)  Creatinine, Serum: 1.26 (08-13)      Liver Biochemical Testing Trend:  Alanine Aminotransferase (ALT/SGPT): 28 (08-18)  Aspartate Aminotransferase (AST/SGOT): 32 (08-18-20 @ 00:33)  Bilirubin Total, Serum: 0.4 (08-18)    MICROBIOLOGY:    Culture - Urine (collected 14 Aug 2020 13:32)  Source: .Urine Clean Catch (Midstream)  Final Report (15 Aug 2020 11:59):    No growth    Culture - Blood (collected 14 Aug 2020 01:32)  Source: .Blood Blood  Preliminary Report (15 Aug 2020 02:02):    No growth    Culture - Blood (collected 14 Aug 2020 01:32)  Source: .Blood Blood  Preliminary Report (15 Aug 2020 02:02):    No growth    Culture - Blood (collected 13 Aug 2020 14:48)  Source: .Blood Blood-Peripheral  Gram Stain (14 Aug 2020 02:20):    Growth in anaerobic bottle: Gram Negative Rods    Growth in aerobic bottle: Gram Negative Rods  Final Report (15 Aug 2020 17:30):    Growth in aerobic and anaerobic bottles: Klebsiella oxytoca/Raoutella    ornithinolytica    See previous culture [id number]    Culture - Blood in AM (08.13.20 @ 12:14)    Gram Stain:   Growth in anaerobic bottle: Gram Negative Rods    -  Levofloxacin: S <=0.5    -  Meropenem: S <=1    -  Klebsiella oxytoca: Detec    -  Ampicillin/Sulbactam: I 16/8 Enterobacter, Citrobacter, and Serratia may develop resistance during prolonged therapy (3-4 days)    -  Ceftriaxone: S <=1 Enterobacter, Citrobacter, and Serratia may develop resistance during prolonged therapy    -  Aztreonam: S <=4    -  Ciprofloxacin: S <=0.25    -  Ertapenem: S <=0.5    -  Imipenem: S <=1    -  Cefepime: S <=2    -  Piperacillin/Tazobactam: S <=8    -  Tobramycin: S <=2    -  Amikacin: S <=16    -  Ampicillin: R >16 These ampicillin results predict results for amoxicillin    -  Gentamicin: S <=2    -  Cefoxitin: S <=8    -  Cefazolin: R >16 Enterobacter, Citrobacter, and Serratia may develop resistance during prolonged therapy (3-4 days)    -  Trimethoprim/Sulfamethoxazole: R >2/38    Specimen Source: .Blood Blood-Peripheral    Organism: Blood Culture PCR    Organism: Klebsiella oxytoca /Raoutella ornithinolytica    Culture Results:   Growth in anaerobic bottle: Klebsiella oxytoca/Raoutella ornithinolytica  "Due to technical problems, Proteus sp. will Not be reported as part of  the BCID panel until further notice"  ***Blood Panel PCR results on this specimen are available  approximately 3 hours after the Gram stain result.***  Gram stain, PCR, and/or culture results may not always  correspond due to difference in methodologies.    Aspergillus Galactomannan Antigen (08.14.20 @ 23:30)    Aspergillus Galactomannan Antigen: <0.500    Fungitell (08.15.20 @ 01:41)    Fungitell: <31    RADIOLOGY & ADDITIONAL STUDIES:    < from: Xray Chest 1 View- PORTABLE-Routine (08.17.20 @ 02:42) >  FINDINGS/  impression:  The size of the cardiomediastinal silhouette cannot be accurately assessed in this single projection.  There are no focal pulmonary consolidations.    Elevated right hemidiaphragm. Small right pleural effusion and right basilar atelectasis. Hazy opacities throughout the left lung.    < end of copied text >

## 2020-08-19 NOTE — PROGRESS NOTE ADULT - PROBLEM SELECTOR PLAN 4
Klebsiella bacteremia   Bcx 8/13 which have since cleared.   -ID recs appreciated  -Source remains unclear  -Isabel changed to cefepime yest 8/17 - based on cx sensitivities. Klebsiella bacteremia   Bcx 8/13 which have since cleared.   -ID recs appreciated  -Source remains unclear  -Isabel changed to cefepime 1g q8h on 8/17 - based on cx sensitivities.

## 2020-08-19 NOTE — PROGRESS NOTE ADULT - ASSESSMENT
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 He has responded appropriately to a single blood transfusion. Of note, he recently has developed dark colored stools. Will investigate if anemia is due to GI bleeding in setting of steroid use or hemolysis and manage appropriately. Underwent UGI without bleeding source identified  Developed gram negative sepsis requiring transfer to CTU  Clinically improving    #Klebsiella oxytoca bacteremia:  - Growing in blood cultures 2/2 sets on 8/13  - Unclear source  - Repeat blood cultures x2 sets 8/14 no growth final  - Klebsiella- sensitive to Cefepime and Cipro/Levofloxacin  - Meropenem changed to Cefepime    #OI prophylaxis-  -has been receiving high dose steroids since 5/20  -Follow up CMV viral load  -Valcyte and Bactrim d/c'ed on 8/17 due to leukopenia  -Need to do Atovaquone 1500mg daily for PCP ppx given on steroid  -Per hemoc, will taper prednisone every 7 days by 10mg    #Pancytopenia- on admission  -Remains with significant leukopenia- especially lymphocyte lines  -COVID PCR neg  -Received by lab: CMV VL, Tick panel, Fungitell  -Galactomann<0.5  -would repeat chest CT scan to see status of prior RUL scarring vs. nodule once patient is stable    #Fungitell  -Follow up repeat Fungitell  -moderately elevated in May and non detectable on more recent June admission  -continue Posaconazole  -repeat chest CT non contrast    #Anemia- r/o GI bleeding    EGD 8/17: esophagitis, acute gastritis s/p biopsy, no ulceration  Consider PPI or H2b    Lobo Mckoy MD, PGY4   ID fellow  Pager: [phone]  After 5pm/weekends call [phone]    d/w Dr. 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 He has responded appropriately to a single blood transfusion. Of note, he recently has developed dark colored stools. Will investigate if anemia is due to GI bleeding in setting of steroid use or hemolysis and manage appropriately. Underwent UGI without bleeding source identified  Developed gram negative sepsis requiring transfer to CTU  Clinically improving    #Klebsiella oxytoca bacteremia:  - Growing in blood cultures 2/2 sets on 8/13  - Unclear source  - Repeat blood cultures x2 sets 8/14 no growth final  - Klebsiella- sensitive to Cefepime and Cipro/Levofloxacin  - Meropenem changed to Cefepime, tentative plan is to continue until Monday 8/24 and finish a 10-day course    #OI prophylaxis-  -has been receiving high dose steroids since 5/20  -Follow up CMV viral load  -Valcyte and Bactrim d/c'ed on 8/17 due to leukopenia  -c/w Posaconazole  -Need to do Atovaquone 1500mg daily for PCP ppx given on steroid  -Per hemoc, will taper prednisone every 7 days by 10mg    #Pancytopenia- on admission  -Remains with significant leukopenia- especially lymphocyte lines  -COVID PCR neg  -Follow up Tick panel  -Galactomann<0.5, Fungitell<31  -would repeat chest CT scan to see status of prior RUL scarring vs. nodule once patient is stable    #Anemia- r/o GI bleeding    EGD 8/17: esophagitis, acute gastritis s/p biopsy, no ulceration  Consider PPI or H2b    oLbo Mckoy MD, PGY4   ID fellow  Pager: [phone]  After 5pm/weekends call [phone]    d/w Dr. 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 He has responded appropriately to a single blood transfusion. Of note, he recently has developed dark colored stools. Will investigate if anemia is due to GI bleeding in setting of steroid use or hemolysis and manage appropriately. Underwent UGI without bleeding source identified  Developed gram negative sepsis requiring transfer to CTU  Clinically improving    #Klebsiella oxytoca bacteremia:  - Growing in blood cultures 2/2 sets on 8/13  - Unclear source  - Repeat blood cultures x2 sets 8/14 no growth final  - Can change Cefepime to Ceftriaxone 2g q24h  - tentative plan is to continue until Monday 8/24 and finish a 10-day course    #OI prophylaxis-  -has been receiving high dose steroids since 5/20  -Follow up CMV viral load  -Valcyte and Bactrim d/c'ed on 8/17 due to leukopenia  -Galactomann<0.5, Fungitell<31, likely can stop posaconazole in the future but will keep it for now to keep the everolimus level steady to avoid organ rejection  -Need to do Atovaquone 1500mg daily for PCP ppx given on steroid  -Per hemoc, will taper prednisone every 7 days by 10mg    #Pancytopenia- on admission  -Remains with significant leukopenia- especially lymphocyte lines  -COVID PCR neg  -Follow up Tick panel  -would repeat chest CT scan to see status of prior RUL scarring vs. nodule once patient is stable    #Anemia- r/o GI bleeding    EGD 8/17: esophagitis, acute gastritis s/p biopsy, no ulceration  Consider PPI or H2b    Lobo Mckoy MD, PGY4   ID fellow  Pager: [phone]  After 5pm/weekends call [phone]    d/w Dr. Lujan

## 2020-08-19 NOTE — PROGRESS NOTE ADULT - SUBJECTIVE AND OBJECTIVE BOX
Subjective: Pt states "Hello" denies any CP or SOB. No acute events overnight.     Telemetry:   - 130  Vital Signs Last 24 Hrs  T(C): 36.9 (20 @ 04:55), Max: 37.2 (20 @ 12:00)  T(F): 98.4 (20 @ 04:55), Max: 98.9 (20 @ 12:00)  HR: 126 (20 @ 04:55) (114 - 130)  BP: 121/85 (20 @ 04:55) (121/85 - 158/79)  RR: 18 (20 @ 04:55) (18 - 27)  SpO2: 94% (20 @ 04:55) (94% - 100%)              @ 07:01  -   @ 07:00  --------------------------------------------------------  IN: 620 mL / OUT: 1200 mL / NET: -580 mL       Daily Weight in k.1 (19 Aug 2020 07:30)                        8.8    3.00  )-----------( 167      ( 19 Aug 2020 06:01 )             24.5     138  |  105  |  28<H>  ----------------------------<  135<H>  3.4<L>   |  23  |  1.11            CAPILLARY BLOOD GLUCOSE  125 - 160        PHYSICAL EXAM  Neurology: A&Ox3, NAD  CV : RRR+S1S2  Lungs: Respirations non-labored, B/L BS CTA  Abdomen: Soft, NT/ND, +BSx4Q, last BM  no melena  (-)N/V/D  : Voiding without difficulty  Extremities: B/L LE no edema, negative calf tenderness, +PP            MEDICATIONS  acetaminophen   Tablet .. 650 milliGRAM(s) Oral every 6 hours PRN  cefepime   IVPB 1000 milliGRAM(s) IV Intermittent every 8 hours  cholecalciferol 1000 Unit(s) Oral daily  dextrose 5%. 1000 milliLiter(s) IV Continuous <Continuous>  enoxaparin Injectable 40 milliGRAM(s) SubCutaneous daily  everolimus (ZORTRESS) 0.75 milliGRAM(s) Oral <User Schedule>  folic acid 1 milliGRAM(s) Oral daily  hydrALAZINE 75 milliGRAM(s) Oral every 8 hours  insulin glargine Injectable (LANTUS) 14 Unit(s) SubCutaneous at bedtime  insulin lispro (HumaLOG) corrective regimen sliding scale   SubCutaneous three times a day before meals  insulin lispro Injectable (HumaLOG) 5 Unit(s) SubCutaneous three times a day with meals  magnesium oxide 400 milliGRAM(s) Oral daily  omega-3-Acid Ethyl Esters 2 Gram(s) Oral two times a day  pantoprazole  Injectable 40 milliGRAM(s) IV Push every 12 hours  posaconazole DR Tablet 300 milliGRAM(s) Oral daily  potassium bicarbonate/potassium citrate 25 milliEquivalent(s) Oral every 1 hour  pravastatin 20 milliGRAM(s) Oral at bedtime  predniSONE   Tablet 40 milliGRAM(s) Oral daily  sodium bicarbonate 650 milliGRAM(s) Oral two times a day  sodium chloride 0.9% lock flush 3 milliLiter(s) IV Push every 8 hours        Discussed with Cardiothoracic Team at AM rounds.

## 2020-08-19 NOTE — PROGRESS NOTE ADULT - ASSESSMENT
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 Patient was treated w/ prednisone and Rituximab. Tacro was discontinued and patient was also transitioned to everolimus. Admitted again 6/16-6/19 for hyperglycemia. On 8/11 Reported to transplant team having one done black tarry stool-  instructed to present to [facility] for hemolytic anemia. Patient has been following with Hematology outpatient.  Denies CP  N/V diarrhea SOB. (11 Aug 2020 20:08)      Surgery/Hospital Course:  8/11 Admitted to [facility] with symptomatic anemia  8/13 EGD for investigation of tarry stools  8/14 CTU for SVT and respiratory distress   8/15 SB capsule: stomach & SB lesions, likely ulcers.  8/17 balloon enteroscopy with GI  8/18 VSS transferred back to floor. 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 Patient was treated w/ prednisone and Rituximab. Tacro was discontinued and patient was also transitioned to everolimus. Admitted again 6/16-6/19 for hyperglycemia. On 8/11 Reported to transplant team having one done black tarry stool-  instructed to present to [facility] for hemolytic anemia. Patient has been following with Hematology outpatient.  Denies CP  N/V diarrhea SOB. (11 Aug 2020 20:08)      Surgery/Hospital Course:  8/11 Admitted to [facility] with symptomatic anemia  8/13 EGD for investigation of tarry stools  8/14 CTU for SVT and respiratory distress   8/15 SB capsule: stomach & SB lesions, likely ulcers.  8/17 EGD/push enteroscopy w/ angioectasias/erosions in small bowel. Gastropathy and esophagitis. Ulcer seen on VCE most likely scope trauma.    8/18 VSS transferred back to floor.   8/19 VSS, H/H stable 8.8/24.5, would continue to hold asa per transplant team. Follow up CMV PCR and gastric biopsy.

## 2020-08-19 NOTE — PROGRESS NOTE ADULT - PROBLEM SELECTOR PLAN 2
S/p 3units pRBC this admission thus far (last transfusion 8/14)   - Stable H&H, no more tarry stools  -Appreciate GI recs; repeat EGD/push enteroscopy- gastritis with no active bleeding seen. F/U gastric biopsy r/o CMV  - ASA d'cd   -Appreciate heme recs, no signs of hemolysis at this time S/p 3units pRBC this admission thus far (last transfusion 8/14)   - Stable H&H, no more tarry stools  -Appreciate GI recs; repeat EGD/push enteroscopy- gastritis with no active bleeding seen. F/U gastric biopsy r/o CMV  - ASA dc'd   -Appreciate heme recs, no signs of hemolysis at this time

## 2020-08-19 NOTE — PROGRESS NOTE ADULT - PROBLEM SELECTOR PLAN 2
-Responded appropriately to 1u PRBC but transfused again 2u PRBC 8/14 with Hgb slowly decreasing  -Repeat EGD/push enteroscopy w/ angioectasias/erosions. No e/o continued GIB.  -GI cleared use of ASA, but will hold for now given GIB and lack of strong indication  -Appreciate heme recs, no signs of hemolysis at this time  -Start oral iron   -Tapering prednisone per hematology: 40 mg daily followed by 10 mg decrease every 7 days

## 2020-08-19 NOTE — PHARMACOTHERAPY INTERVENTION NOTE - NSPHARMCOMMASP
Attempting to locate the PCP for this patient to notify of incidental findings on MRI.     Listed PCP - Mily MARTINEZ - is no longer his PCP, she has moved.   MICHELLE Cronin - family clinic provider - saw Chriss on 3/21/19. Message left at clinic in an effort to determine if she is the new PCP - phone [phone]  Fax - [phone].  Call to pt to determine who PCP is and to have him set up a follow up visit with Dr. Medrano.   Per Chriss, he sees someone named Dinorah.  MRI report will be faxed to Hilary Andrews.      Informed him that the colon had some incidental findings that would need to be reviewed by Dinorah.  She would determine if any further testing would need to be done.  He states he had a test recently of the colon and it was fine.  Advised that he needs call Dinorah to find out if there is any follow up needed.     Advised tht he needs to see Dr. Medrano for follow up to discuss MRI findings.  No appointment currently.  He is requesting to be seen as soon as possible since he is leaving for [place] on 5/7/19.  No return date, one way ticket.  Advised that doctor may not have openings prior to him leaving the country, but would ask scheduling to reach out to him .    Today's visit was performed with the assistance of  Services.   Name of : [id number]   Service used: Phone : Third Party          ASP - De-escalation

## 2020-08-19 NOTE — CHART NOTE - NSCHARTNOTEFT_GEN_A_CORE
Question regarding leukopenia and tapering of prednisone.     Leukopenia multifactorial, seems to be more acute drop this admission most likely attributed to active medical problems (klebsiella bactermia) along with possible medication induced. Given he is on prednisone would continue with PCP ppx as ID rec, if not bactrim then atovoquone.     His prednisone can be tapered every 7 days by 10mg. If prednisone not needed for transplant then can taper off.     Johnnie Whitten MD   Hematology/Oncology Fellow   [phone]

## 2020-08-19 NOTE — PROGRESS NOTE ADULT - ATTENDING COMMENTS
no events.   meds: cefipime, posi, HDZN 75 Q8, prava, evero .75 bid, pred 40, off bactrim off valcyte,   120/-158/, 110, afebrile.   08-19    138  |  105  |  28<H>  ----------------------------<  135<H>  3.4<L>   |  23  |  1.11  Ca    8.3<L>      19 Aug 2020 06:01  Phos  2.6     08-18  Mg     2.4     08-18  TPro  5.5<L>  /  Alb  3.1<L>  /  TBili  0.4  /  DBili  x   /  AST  32  /  ALT  28  /  AlkPhos  57  08-18                        8.8    3.00  )-----------( 167      ( 19 Aug 2020 06:01 )             24.5   WBC 3.0, 2.6   CMV PCR pending. gastric biopsy pending.   Clinically stable no further bleeding.   Plan:  ID to address possible outpatient ab strategy.   Hem/onc to reconsult re leukopenia/lymphopenia.   Possible d/c tomorrow.   Marvin Campbell no events.   meds: cefipime, posi, HDZN 75 Q8, prava, evero .75 bid, pred 40, off bactrim off valcyte,   120/-158/, 110, afebrile.   08-19    138  |  105  |  28<H>  ----------------------------<  135<H>  3.4<L>   |  23  |  1.11  Ca    8.3<L>      19 Aug 2020 06:01  Phos  2.6     08-18  Mg     2.4     08-18  TPro  5.5<L>  /  Alb  3.1<L>  /  TBili  0.4  /  DBili  x   /  AST  32  /  ALT  28  /  AlkPhos  57  08-18                        8.8    3.00  )-----------( 167      ( 19 Aug 2020 06:01 )             24.5   WBC 3.0, 2.6   CMV PCR pending. gastric biopsy pending.   Clinically stable no further bleeding.   Plan:  ID to address possible outpatient ab strategy.   Hem/onc to reconsult re leukopenia/lymphopenia.   Possible d/c tomorrow.   Follow CMV PCR and biopsy  Marvin Campbell no events.   meds: cefipime, posi, HDZN 75 Q8, prava, evero .75 bid, pred 40, off bactrim off valcyte,   120/-158/, 110, afebrile.   08-19    138  |  105  |  28<H>  ----------------------------<  135<H>  3.4<L>   |  23  |  1.11  Ca    8.3<L>      19 Aug 2020 06:01  Phos  2.6     08-18  Mg     2.4     08-18  TPro  5.5<L>  /  Alb  3.1<L>  /  TBili  0.4  /  DBili  x   /  AST  32  /  ALT  28  /  AlkPhos  57  08-18                        8.8    3.00  )-----------( 167      ( 19 Aug 2020 06:01 )             24.5   WBC 3.0, 2.6   CMV PCR pending. gastric biopsy pending.   Clinically stable no further bleeding.   Plan:  ID to address possible outpatient ab strategy.   Hem/onc to reconsult re leukopenia/lymphopenia.   Possible d/c tomorrow.   Follow CMV PCR and biopsy  Repeat LEIGH Campbell

## 2020-08-19 NOTE — PROGRESS NOTE ADULT - PROBLEM SELECTOR PLAN 3
-Appr hem/onc recs  -Likely 2/2 active infxn  -C/w tx for bacteremia, as below  -C/w trend CBC  -Await CMV PCR and GI bx

## 2020-08-20 LAB
A PHAGOCYTOPH IGG TITR SER IF: SIGNIFICANT CHANGE UP TITER
ANION GAP SERPL CALC-SCNC: 11 MMOL/L — SIGNIFICANT CHANGE UP (ref 5–17)
B BURGDOR AB SER QL IA: NEGATIVE — SIGNIFICANT CHANGE UP
B MICROTI IGG TITR SER: SIGNIFICANT CHANGE UP TITER
BUN SERPL-MCNC: 29 MG/DL — HIGH (ref 7–23)
CALCIUM SERPL-MCNC: 8.7 MG/DL — SIGNIFICANT CHANGE UP (ref 8.4–10.5)
CHLORIDE SERPL-SCNC: 103 MMOL/L — SIGNIFICANT CHANGE UP (ref 96–108)
CO2 SERPL-SCNC: 23 MMOL/L — SIGNIFICANT CHANGE UP (ref 22–31)
CREAT SERPL-MCNC: 1.03 MG/DL — SIGNIFICANT CHANGE UP (ref 0.5–1.3)
E CHAFFEENSIS IGG TITR SER IF: SIGNIFICANT CHANGE UP TITER
GLUCOSE BLDC GLUCOMTR-MCNC: 103 MG/DL — HIGH (ref 70–99)
GLUCOSE BLDC GLUCOMTR-MCNC: 118 MG/DL — HIGH (ref 70–99)
GLUCOSE BLDC GLUCOMTR-MCNC: 139 MG/DL — HIGH (ref 70–99)
GLUCOSE BLDC GLUCOMTR-MCNC: 169 MG/DL — HIGH (ref 70–99)
GLUCOSE BLDC GLUCOMTR-MCNC: 72 MG/DL — SIGNIFICANT CHANGE UP (ref 70–99)
GLUCOSE BLDC GLUCOMTR-MCNC: 93 MG/DL — SIGNIFICANT CHANGE UP (ref 70–99)
GLUCOSE SERPL-MCNC: 109 MG/DL — HIGH (ref 70–99)
HCT VFR BLD CALC: 28.4 % — LOW (ref 39–50)
HGB BLD-MCNC: 9 G/DL — LOW (ref 13–17)
MCHC RBC-ENTMCNC: 31.5 PG — SIGNIFICANT CHANGE UP (ref 27–34)
MCHC RBC-ENTMCNC: 31.7 GM/DL — LOW (ref 32–36)
MCV RBC AUTO: 99.3 FL — SIGNIFICANT CHANGE UP (ref 80–100)
NRBC # BLD: 0 /100 WBCS — SIGNIFICANT CHANGE UP (ref 0–0)
PLATELET # BLD AUTO: 163 K/UL — SIGNIFICANT CHANGE UP (ref 150–400)
POTASSIUM SERPL-MCNC: 4.2 MMOL/L — SIGNIFICANT CHANGE UP (ref 3.5–5.3)
POTASSIUM SERPL-SCNC: 4.2 MMOL/L — SIGNIFICANT CHANGE UP (ref 3.5–5.3)
RBC # BLD: 2.86 M/UL — LOW (ref 4.2–5.8)
RBC # FLD: 18.6 % — HIGH (ref 10.3–14.5)
SODIUM SERPL-SCNC: 137 MMOL/L — SIGNIFICANT CHANGE UP (ref 135–145)
WBC # BLD: 2.66 K/UL — LOW (ref 3.8–10.5)
WBC # FLD AUTO: 2.66 K/UL — LOW (ref 3.8–10.5)

## 2020-08-20 PROCEDURE — 99232 SBSQ HOSP IP/OBS MODERATE 35: CPT

## 2020-08-20 PROCEDURE — 99233 SBSQ HOSP IP/OBS HIGH 50: CPT

## 2020-08-20 RX ORDER — HYDRALAZINE HCL 50 MG
75 TABLET ORAL
Refills: 0 | Status: COMPLETED | OUTPATIENT
Start: 2020-08-20 | End: 2020-08-20

## 2020-08-20 RX ORDER — NIFEDIPINE 30 MG
120 TABLET, EXTENDED RELEASE 24 HR ORAL
Refills: 0 | Status: DISCONTINUED | OUTPATIENT
Start: 2020-08-21 | End: 2020-08-24

## 2020-08-20 RX ORDER — CEFTRIAXONE 500 MG/1
2000 INJECTION, POWDER, FOR SOLUTION INTRAMUSCULAR; INTRAVENOUS EVERY 24 HOURS
Refills: 0 | Status: DISCONTINUED | OUTPATIENT
Start: 2020-08-20 | End: 2020-08-24

## 2020-08-20 RX ADMIN — EVEROLIMUS 0.75 MILLIGRAM(S): 10 TABLET ORAL at 07:39

## 2020-08-20 RX ADMIN — Medication 40 MILLIGRAM(S): at 06:00

## 2020-08-20 RX ADMIN — SODIUM CHLORIDE 3 MILLILITER(S): 9 INJECTION INTRAMUSCULAR; INTRAVENOUS; SUBCUTANEOUS at 13:16

## 2020-08-20 RX ADMIN — PANTOPRAZOLE SODIUM 40 MILLIGRAM(S): 20 TABLET, DELAYED RELEASE ORAL at 06:01

## 2020-08-20 RX ADMIN — Medication 5 UNIT(S): at 17:40

## 2020-08-20 RX ADMIN — Medication 650 MILLIGRAM(S): at 17:41

## 2020-08-20 RX ADMIN — Medication 2 GRAM(S): at 17:41

## 2020-08-20 RX ADMIN — Medication 5 UNIT(S): at 07:39

## 2020-08-20 RX ADMIN — ATOVAQUONE 1500 MILLIGRAM(S): 750 SUSPENSION ORAL at 12:02

## 2020-08-20 RX ADMIN — CEFEPIME 100 MILLIGRAM(S): 1 INJECTION, POWDER, FOR SOLUTION INTRAMUSCULAR; INTRAVENOUS at 06:00

## 2020-08-20 RX ADMIN — POSACONAZOLE 300 MILLIGRAM(S): 100 TABLET, DELAYED RELEASE ORAL at 12:01

## 2020-08-20 RX ADMIN — Medication 1 MILLIGRAM(S): at 12:02

## 2020-08-20 RX ADMIN — SODIUM CHLORIDE 3 MILLILITER(S): 9 INJECTION INTRAMUSCULAR; INTRAVENOUS; SUBCUTANEOUS at 21:26

## 2020-08-20 RX ADMIN — Medication 75 MILLIGRAM(S): at 13:27

## 2020-08-20 RX ADMIN — Medication 2 GRAM(S): at 06:01

## 2020-08-20 RX ADMIN — Medication 20 MILLIGRAM(S): at 21:38

## 2020-08-20 RX ADMIN — PANTOPRAZOLE SODIUM 40 MILLIGRAM(S): 20 TABLET, DELAYED RELEASE ORAL at 17:42

## 2020-08-20 RX ADMIN — Medication 5 UNIT(S): at 12:14

## 2020-08-20 RX ADMIN — Medication 1000 UNIT(S): at 12:01

## 2020-08-20 RX ADMIN — ENOXAPARIN SODIUM 40 MILLIGRAM(S): 100 INJECTION SUBCUTANEOUS at 12:02

## 2020-08-20 RX ADMIN — Medication 2: at 17:40

## 2020-08-20 RX ADMIN — Medication 25 MILLIGRAM(S): at 21:38

## 2020-08-20 RX ADMIN — CEFTRIAXONE 100 MILLIGRAM(S): 500 INJECTION, POWDER, FOR SOLUTION INTRAMUSCULAR; INTRAVENOUS at 13:26

## 2020-08-20 RX ADMIN — Medication 650 MILLIGRAM(S): at 06:01

## 2020-08-20 RX ADMIN — MAGNESIUM OXIDE 400 MG ORAL TABLET 400 MILLIGRAM(S): 241.3 TABLET ORAL at 12:01

## 2020-08-20 RX ADMIN — EVEROLIMUS 0.75 MILLIGRAM(S): 10 TABLET ORAL at 20:05

## 2020-08-20 RX ADMIN — Medication 75 MILLIGRAM(S): at 06:00

## 2020-08-20 RX ADMIN — SODIUM CHLORIDE 3 MILLILITER(S): 9 INJECTION INTRAMUSCULAR; INTRAVENOUS; SUBCUTANEOUS at 06:13

## 2020-08-20 RX ADMIN — INSULIN GLARGINE 14 UNIT(S): 100 INJECTION, SOLUTION SUBCUTANEOUS at 22:55

## 2020-08-20 NOTE — PROGRESS NOTE ADULT - SUBJECTIVE AND OBJECTIVE BOX
Chief Complaint:  Patient is a 70y old  Male who presents with a chief complaint of SOB/anemia (19 Aug 2020 14:35)      Interval Events: Pt feels well, denies nausea, vomiting, abd pain. Path came back as intestinal metaplasia.   ROS: All 12 point system except listed above were otherwise negative.    Allergies:  No Known Allergies        Hospital Medications:  acetaminophen   Tablet .. 650 milliGRAM(s) Oral every 6 hours PRN  atovaquone Suspension 1500 milliGRAM(s) Oral daily  cefepime   IVPB 1000 milliGRAM(s) IV Intermittent every 8 hours  cholecalciferol 1000 Unit(s) Oral daily  dextrose 5%. 1000 milliLiter(s) IV Continuous <Continuous>  enoxaparin Injectable 40 milliGRAM(s) SubCutaneous daily  everolimus (ZORTRESS) 0.75 milliGRAM(s) Oral <User Schedule>  folic acid 1 milliGRAM(s) Oral daily  hydrALAZINE 75 milliGRAM(s) Oral every 8 hours  insulin glargine Injectable (LANTUS) 14 Unit(s) SubCutaneous at bedtime  insulin lispro (HumaLOG) corrective regimen sliding scale   SubCutaneous three times a day before meals  insulin lispro Injectable (HumaLOG) 5 Unit(s) SubCutaneous three times a day with meals  magnesium oxide 400 milliGRAM(s) Oral daily  omega-3-Acid Ethyl Esters 2 Gram(s) Oral two times a day  pantoprazole  Injectable 40 milliGRAM(s) IV Push every 12 hours  posaconazole DR Tablet 300 milliGRAM(s) Oral daily  pravastatin 20 milliGRAM(s) Oral at bedtime  predniSONE   Tablet 40 milliGRAM(s) Oral daily  sodium bicarbonate 650 milliGRAM(s) Oral two times a day  sodium chloride 0.9% lock flush 3 milliLiter(s) IV Push every 8 hours      PMHX/PSHX:  H/O hemolytic anemia  H/O autoimmune hemolytic anemia  Knee pain, right  HLD (hyperlipidemia)  Former smoker  DVT of upper extremity (deep vein thrombosis)  Hepatitis C virus  GIB (gastrointestinal bleeding)  H/O prior ablation treatment  Ventricular fibrillation  PAF (paroxysmal atrial fibrillation)  Non-Ischemic Cardiomyopathy  SVT (Supraventricular Tachycardia)  HTN  CHF (Congestive Heart Failure)  S/P right heart catheterization  H/O heart transplant  LVAD (left ventricular assist device) present  Status post left hip replacement  Hypertension  Hypertension  History of Prior Ablation Treatment  AICD (Automatic Cardioverter/Defibrillator) Present      Family history:  No pertinent family history in first degree relatives    There is no family history of peptic ulcer disease, gastric cancer, colon polyps, colon cancer, celiac disease, biliary, hepatic, or pancreatic disease.  None of the female relatives have breast, uterine, or ovarian cancer.     PHYSICAL EXAM:   Vital Signs:  Vital Signs Last 24 Hrs  T(C): 36.7 (20 Aug 2020 04:43), Max: 37 (19 Aug 2020 19:18)  T(F): 98.1 (20 Aug 2020 04:43), Max: 98.6 (19 Aug 2020 19:18)  HR: 122 (20 Aug 2020 04:43) (122 - 147)  BP: 123/89 (20 Aug 2020 04:43) (116/77 - 139/90)  BP(mean): --  RR: 18 (20 Aug 2020 04:43) (18 - 18)  SpO2: 96% (20 Aug 2020 04:43) (94% - 98%)  Daily     Daily Weight in k.6 (20 Aug 2020 07:56)    GENERAL: no acute distress  NEURO: alert, no asterixis  HEENT: anicteric sclera, no conjunctival pallor appreciated  CHEST: no respiratory distress, no accessory muscle use  CARDIAC: regular rate, rhythm  ABDOMEN: soft, non-tender, non-distended, no rebound or guarding  EXTREMITIES: warm, well perfused, no edema  SKIN: no lesions noted  LABS:                        9.0    2.66  )-----------( 163      ( 20 Aug 2020 07:42 )             28.4     Mean Cell Volume: 99.3 fl (- @ 07:42)        137  |  103  |  29<H>  ----------------------------<  109<H>  4.2   |  23  |  1.03    Ca    8.7      20 Aug 2020 07:42                                    9.0    2.66  )-----------( 163      ( 20 Aug 2020 07:42 )             28.4                         8.8    3.00  )-----------( 167      ( 19 Aug 2020 06:01 )             24.5                         8.4    2.59  )-----------( 155      ( 18 Aug 2020 00:33 )             26.1                         8.3    2.08  )-----------( 146      ( 17 Aug 2020 12:50 )             26.7     Imaging:    Surgical Pathology Report:   ACCESSION No:  [id number]    BILLY RUSSELL        Surgical Final Report          Final Diagnosis  1. Stomach, endoscopic biopsy:  - Gastric mucosa with chronic inactive gastritis and intestinal  metaplasia  - No Helicobacter microorganisms identified (Warthin-Starry  stain)  - No viral cytopathic effect identified  - Immunostain for cytomegalovirus: Negative    Verified by: KAYLA SAUER MD  (Electronic Signature)  Reported on: 20 16:22 EDT, [address] [place] [address]  Phone: [phone]   Fax: [phone]  _________________________________________________________________    Clinical History  Pre: gastric ulcer on capsule endoscopy.  Post: gastritis.  Rule out CMV.    Specimen(s) Submitted  1     Gastric, biopsy    Gross Description  1. The specimen is received in formalin and the specimen  container is labeled: Gastric biopsies.  It consists of five  soft, tan-pink fragments of tissue ranging from 0.3 cm to 0.5 cm  in greatest dimension.  Special stains are requested.   Entirely  submitted.  One cassette.    In addition to other data that may appear on the specimen  container, the label has been inspected to confirm the presence  of the patient's name and date of birth.    Ervin Aranda 2020 17:24 (20 @ 15:20)

## 2020-08-20 NOTE — PROGRESS NOTE ADULT - ASSESSMENT
Impression:  #Melena: s/p EGD with gastropathy and esophagitis. Biopsies taken showing chronic inactive gastritis and intestinal metaplasia.   #Bacteremia  #Tachycardia/hypotension  #S/p OHT    Recommendations:  - continue with PPI 40mg po daily  - okay to continue with aspirin  - given intestinal metaplasia on pathology, will need EGD in 3 years for surveillance and mapping  - supportive care Impression:  #Melena: s/p EGD with gastropathy and esophagitis. Biopsies taken showing chronic inactive gastritis and intestinal metaplasia. No CMV  #Bacteremia  #Tachycardia/hypotension  #S/p OHT    Recommendations:  - continue with PPI 40mg po daily  - okay to continue with aspirin  - given intestinal metaplasia on pathology, will need EGD in 3 years for surveillance and mapping  - supportive care

## 2020-08-20 NOTE — PROGRESS NOTE ADULT - PROBLEM SELECTOR PLAN 3
-Appr hem/onc recs  -Likely 2/2 active infxn  -C/w tx for bacteremia, as below  -C/w trend CBC  -Await CMV PCR; GI bx unremarkable

## 2020-08-20 NOTE — PROGRESS NOTE ADULT - PROBLEM SELECTOR PLAN 1
-C/w statin for TCAD ppx  -ASA on hold given GIB  -C/w everolimus 0.75mg BID, will follow levels periodically while hospitalized (true trough in lab). Goal 8-10.  -Continue high dose PDN which is being weaned as below   -C/w posiconazole for ppx; Bactrim and Valcyte on hold given leukopenia  -Off Cellcept while on high dose steroids. Will eventually need to start Cellcept as steroids weaned  -Repeat TTE reviewed; unremarkable  -Given persistent tachy, favor repeat EMB. Possibly tomorrow vs Mon. If tomorrow, will need to hold DVT ppx in AM.

## 2020-08-20 NOTE — PROGRESS NOTE ADULT - SUBJECTIVE AND OBJECTIVE BOX
Subjective ' My stomach is still tight"    VITAL SIGNS    Telemetry:  -130    Vital Signs Last 24 Hrs  T(C): 36.7 (20 @ 04:43), Max: 37 (20 @ 19:18)  T(F): 98.1 (20 @ 04:43), Max: 98.6 (20 @ 19:18)  HR: 122 (20 @ 04:43) (122 - 147)  BP: 123/89 (20 @ 04:43) (116/77 - 139/90)  RR: 18 (20 @ 04:43) (18 - 18)  SpO2: 96% (20 @ 04:43) (94% - 98%)              @ 07:01  -   @ 07:00  --------------------------------------------------------  IN: 616 mL / OUT: 1350 mL / NET: -734 mL     @ 07:01  -   @ 10:59  --------------------------------------------------------  IN: 120 mL / OUT: 200 mL / NET: -80 mL    Daily Weight in k.6 (20 Aug 2020 07:56)    MEDICATIONS  (STANDING):  atovaquone Suspension 1500 milliGRAM(s) Oral daily  cefepime   IVPB 1000 milliGRAM(s) IV Intermittent every 8 hours  cholecalciferol 1000 Unit(s) Oral daily  dextrose 5%. 1000 milliLiter(s) (50 mL/Hr) IV Continuous <Continuous>  enoxaparin Injectable 40 milliGRAM(s) SubCutaneous daily  everolimus (ZORTRESS) 0.75 milliGRAM(s) Oral <User Schedule>  folic acid 1 milliGRAM(s) Oral daily  hydrALAZINE 75 milliGRAM(s) Oral every 8 hours  insulin glargine Injectable (LANTUS) 14 Unit(s) SubCutaneous at bedtime  insulin lispro (HumaLOG) corrective regimen sliding scale   SubCutaneous three times a day before meals  insulin lispro Injectable (HumaLOG) 5 Unit(s) SubCutaneous three times a day with meals  magnesium oxide 400 milliGRAM(s) Oral daily  omega-3-Acid Ethyl Esters 2 Gram(s) Oral two times a day  pantoprazole  Injectable 40 milliGRAM(s) IV Push every 12 hours  posaconazole DR Tablet 300 milliGRAM(s) Oral daily  pravastatin 20 milliGRAM(s) Oral at bedtime  predniSONE   Tablet 40 milliGRAM(s) Oral daily  sodium bicarbonate 650 milliGRAM(s) Oral two times a day  sodium chloride 0.9% lock flush 3 milliLiter(s) IV Push every 8 hours    MEDICATIONS  (PRN):  acetaminophen   Tablet .. 650 milliGRAM(s) Oral every 6 hours PRN Mild Pain (1 - 3)        CAPILLARY BLOOD GLUCOSE      POCT Blood Glucose.: 118 mg/dL (20 Aug 2020 07:32)  POCT Blood Glucose.: 105 mg/dL (19 Aug 2020 21:38)  POCT Blood Glucose.: 220 mg/dL (19 Aug 2020 16:51)  POCT Blood Glucose.: 180 mg/dL (19 Aug 2020 11:47)                                PHYSICAL EXAM  Neurology: alert and oriented x 3, nonfocal, no gross deficits    CV : S1 S12RRR    Sternal Wound : healed    Lungs:B/l CTA on room air    Abdomen:  nontender, distended, positive bowel sounds, last bowel movement     : voids             Extremities:    equal strength throughout   B/lle warm well perfused + DP                                       Physical Therapy Rec:   Home  [  x]   Home w/ PT  [  ]  Rehab  [  ]    Discussed with Cardiothoracic Team at AM rounds.

## 2020-08-20 NOTE — PROGRESS NOTE ADULT - ATTENDING COMMENTS
no events. no further GI bleeding.   TTE: normal LV function. no valvular issues seen.   meds: mepron, ceftriaxome 2g QHS (through 8.24), posi 300, HDZN 75 Q8, evero .75 bid, pred 40.   122-130, 116/90. Afebrile  08-20    137  |  103  |  29<H>  ----------------------------<  109<H>  4.2   |  23  |  1.03  Ca    8.7      20 Aug 2020 07:42                        9.0    2.66  )-----------( 163      ( 20 Aug 2020 07:42 )             28.4   WBC 2.6, 3.0, 2.6 no events. no further GI bleeding.   TTE: normal LV function. no valvular issues seen.   meds: mepron, ceftriaxome 2g QHS (through 8.24), posi 300, HDZN 75 Q8, evero .75 bid, pred 40.   122-130, 116/90. Afebrile  08-20    137  |  103  |  29<H>  ----------------------------<  109<H>  4.2   |  23  |  1.03  Ca    8.7      20 Aug 2020 07:42                        9.0    2.66  )-----------( 163      ( 20 Aug 2020 07:42 )             28.4   WBC 2.6, 3.0, 2.6  gastic path no evidence of viral disease.   CMV PCR pending.   Plan to defer d/c till completion of antibiotics.   Wean Pred to 30mg from tomorrow.   Marvin Campbell no events. no further GI bleeding.   TTE: normal LV function. no valvular issues seen.   meds: mepron, ceftriaxome 2g QHS (through 8.24), posi 300, HDZN 75 Q8, evero .75 bid, pred 40.   122-130, 116/90. Afebrile  08-20    137  |  103  |  29<H>  ----------------------------<  109<H>  4.2   |  23  |  1.03  Ca    8.7      20 Aug 2020 07:42                        9.0    2.66  )-----------( 163      ( 20 Aug 2020 07:42 )             28.4   WBC 2.6, 3.0, 2.6  gastic path no evidence of viral disease.   CMV PCR pending.   Plan to defer d/c till completion of antibiotics.   Will bx prior to d/c : has not have bx off [facility] and because of persistetn tachy.   Wean Pred to 30mg from tomorrow.   [place]  d/c HDZN from tomorrow.   Marvin Campbell

## 2020-08-20 NOTE — PROGRESS NOTE ADULT - PROBLEM SELECTOR PLAN 4
Klebsiella bacteremia   Bcx 8/13 which have since cleared.   -ID recs appreciated  -Source remains unclear  -Isabel changed to cefepime 1g q8h on 8/17 - based on cx sensitivities.

## 2020-08-20 NOTE — PROGRESS NOTE ADULT - ATTENDING COMMENTS
Patient seen and examined with Dr. Mckoy    I agree with his interval history exam and plans as noted above    Stable  Klebsiella bacteremia on admission cleared with antimicrobials  sensitive to Ceftriaxone- abx changed to Ceftriaxone 2gmIV/day    Gary Lujan MD  [phone]  After 5pm/weekends [phone]

## 2020-08-20 NOTE — PROGRESS NOTE ADULT - PROBLEM SELECTOR PLAN 5
-Klebsiella bacteremia from 8/13 which have since cleared. CT with ? pneumonia and urine culture with > 3 organisms concerning for contaminant.   -ID recs appreciated  -C/w CTX 2g QHS through Mon

## 2020-08-20 NOTE — PROGRESS NOTE ADULT - PROBLEM SELECTOR PLAN 2
S/p 3units pRBC this admission thus far (last transfusion 8/14)   - Stable H&H, no more tarry stools  -Appreciate GI recs; repeat EGD/push enteroscopy- gastritis with no active bleeding seen. F/U gastric biopsy r/o CMV  - ASA dc'd   -Appreciate heme recs, no signs of hemolysis at this time

## 2020-08-20 NOTE — PROGRESS NOTE ADULT - ATTENDING COMMENTS
Stable H&H  Bx negative for CMV  No further GI testing at this time  Maintain on IV PPI daily  If rebleeds - call GI team back, otherwise will s/o at this time

## 2020-08-20 NOTE — PROGRESS NOTE ADULT - SUBJECTIVE AND OBJECTIVE BOX
Patient seen and examined at bedside.    Overnight Events:     Review Of Systems: No chest pain, shortness of breath, or palpitations            Current Meds:  acetaminophen   Tablet .. 650 milliGRAM(s) Oral every 6 hours PRN  atovaquone Suspension 1500 milliGRAM(s) Oral daily  cefTRIAXone   IVPB 2000 milliGRAM(s) IV Intermittent every 24 hours  cholecalciferol 1000 Unit(s) Oral daily  dextrose 5%. 1000 milliLiter(s) IV Continuous <Continuous>  enoxaparin Injectable 40 milliGRAM(s) SubCutaneous daily  everolimus (ZORTRESS) 0.75 milliGRAM(s) Oral <User Schedule>  folic acid 1 milliGRAM(s) Oral daily  hydrALAZINE 75 milliGRAM(s) Oral every 8 hours  insulin glargine Injectable (LANTUS) 14 Unit(s) SubCutaneous at bedtime  insulin lispro (HumaLOG) corrective regimen sliding scale   SubCutaneous three times a day before meals  insulin lispro Injectable (HumaLOG) 5 Unit(s) SubCutaneous three times a day with meals  magnesium oxide 400 milliGRAM(s) Oral daily  omega-3-Acid Ethyl Esters 2 Gram(s) Oral two times a day  pantoprazole  Injectable 40 milliGRAM(s) IV Push every 12 hours  posaconazole DR Tablet 300 milliGRAM(s) Oral daily  pravastatin 20 milliGRAM(s) Oral at bedtime  predniSONE   Tablet 40 milliGRAM(s) Oral daily  sodium bicarbonate 650 milliGRAM(s) Oral two times a day  sodium chloride 0.9% lock flush 3 milliLiter(s) IV Push every 8 hours      Vitals:  T(F): 98.1 (08-20), Max: 98.6 (08-19)  HR: 122 (08-20) (122 - 147)  BP: 123/89 (08-20) (123/89 - 139/90)  RR: 18 (08-20)  SpO2: 96% (08-20)  I&O's Summary    19 Aug 2020 07:01  -  20 Aug 2020 07:00  --------------------------------------------------------  IN: 616 mL / OUT: 1350 mL / NET: -734 mL    20 Aug 2020 07:01  -  20 Aug 2020 13:05  --------------------------------------------------------  IN: 360 mL / OUT: 600 mL / NET: -240 mL        Physical Exam:  Gen: NAD.  HEENT: NCAT. PERRLA b/l.  Neck: No JVP elev.  CV: Normal S1, S2. RRR. No MRG.  Chest: CTAB. No WRR.  Abd: +BSx4. Soft. NTND.  Ext: No LE edema.  Skin: No cyanosis.                          9.0    2.66  )-----------( 163      ( 20 Aug 2020 07:42 )             28.4     08-20    137  |  103  |  29<H>  ----------------------------<  109<H>  4.2   |  23  |  1.03    Ca    8.7      20 Aug 2020 07:42        CARDIAC MARKERS ( 13 Aug 2020 23:51 )  36 ng/L / x     / x     / 61 U/L / x     / 2.7 ng/mL  CARDIAC MARKERS ( 13 Aug 2020 22:53 )  25 ng/L / x     / x     / 78 U/L / x     / 3.0 ng/mL              New ECG(s): Personally reviewed    Echo:    Stress Testing:     Cath:    Imaging:    Interpretation of Telemetry: Patient seen and examined at bedside.    Overnight Events: NAEO. No complaints. Remains tachy, although has been persistently tachy over last year at least.    Review Of Systems: No chest pain, shortness of breath, or palpitations            Current Meds:  acetaminophen   Tablet .. 650 milliGRAM(s) Oral every 6 hours PRN  atovaquone Suspension 1500 milliGRAM(s) Oral daily  cefTRIAXone   IVPB 2000 milliGRAM(s) IV Intermittent every 24 hours  cholecalciferol 1000 Unit(s) Oral daily  dextrose 5%. 1000 milliLiter(s) IV Continuous <Continuous>  enoxaparin Injectable 40 milliGRAM(s) SubCutaneous daily  everolimus (ZORTRESS) 0.75 milliGRAM(s) Oral <User Schedule>  folic acid 1 milliGRAM(s) Oral daily  hydrALAZINE 75 milliGRAM(s) Oral every 8 hours  insulin glargine Injectable (LANTUS) 14 Unit(s) SubCutaneous at bedtime  insulin lispro (HumaLOG) corrective regimen sliding scale   SubCutaneous three times a day before meals  insulin lispro Injectable (HumaLOG) 5 Unit(s) SubCutaneous three times a day with meals  magnesium oxide 400 milliGRAM(s) Oral daily  omega-3-Acid Ethyl Esters 2 Gram(s) Oral two times a day  pantoprazole  Injectable 40 milliGRAM(s) IV Push every 12 hours  posaconazole DR Tablet 300 milliGRAM(s) Oral daily  pravastatin 20 milliGRAM(s) Oral at bedtime  predniSONE   Tablet 40 milliGRAM(s) Oral daily  sodium bicarbonate 650 milliGRAM(s) Oral two times a day  sodium chloride 0.9% lock flush 3 milliLiter(s) IV Push every 8 hours      Vitals:  T(F): 98.1 (08-20), Max: 98.6 (08-19)  HR: 122 (08-20) (122 - 147)  BP: 123/89 (08-20) (123/89 - 139/90)  RR: 18 (08-20)  SpO2: 96% (08-20)  I&O's Summary    19 Aug 2020 07:01  -  20 Aug 2020 07:00  --------------------------------------------------------  IN: 616 mL / OUT: 1350 mL / NET: -734 mL    20 Aug 2020 07:01  -  20 Aug 2020 13:05  --------------------------------------------------------  IN: 360 mL / OUT: 600 mL / NET: -240 mL        Physical Exam:  Gen: NAD.  HEENT: NCAT. PERRLA b/l.  Neck: No JVP elev.  CV: Normal S1, S2. Tachy w/ reg rhythm.. No MRG.  Chest: CTAB. No WRR.  Abd: +BSx4. Soft. NTND.  Ext: No LE edema.  Skin: No cyanosis.                          9.0    2.66  )-----------( 163      ( 20 Aug 2020 07:42 )             28.4     08-20    137  |  103  |  29<H>  ----------------------------<  109<H>  4.2   |  23  |  1.03    Ca    8.7      20 Aug 2020 07:42        CARDIAC MARKERS ( 13 Aug 2020 23:51 )  36 ng/L / x     / x     / 61 U/L / x     / 2.7 ng/mL  CARDIAC MARKERS ( 13 Aug 2020 22:53 )  25 ng/L / x     / x     / 78 U/L / x     / 3.0 ng/mL    Echo: < from: Transthoracic Echocardiogram (08.19.20 @ 14:52) >  1. Mitral valve not well visualized, probably normal. Mild  mitral regurgitation.  2. Aortic valve not well visualized; probably normal.  3. Endocardium not well visualized; grossly normal left  ventricular systolic function.  4. The right ventricle is not well visualized.

## 2020-08-20 NOTE — PROGRESS NOTE ADULT - ASSESSMENT
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 Patient was treated w/ prednisone and Rituximab. Tacro was discontinued and patient was also transitioned to everolimus. Admitted again 6/16-6/19 for hyperglycemia. On 8/11 Reported to transplant team having one done black tarry stool-  instructed to present to [facility] for hemolytic anemia. Patient has been following with Hematology outpatient.  Denies CP  N/V diarrhea SOB. (11 Aug 2020 20:08)      Surgery/Hospital Course:  8/11 Admitted to [facility] with symptomatic anemia  8/13 EGD for investigation of tarry stools  8/14 CTU for SVT and respiratory distress   8/15 SB capsule: stomach & SB lesions, likely ulcers.  8/17 EGD/push enteroscopy w/ angioectasias/erosions in small bowel. Gastropathy and esophagitis. Ulcer seen on VCE most likely scope trauma.    8/18 VSS transferred back to floor.   8/19 VSS, H/H stable 8.8/24.5, would continue to hold asa per transplant team. Follow up CMV PCR and gastric biopsy.   8/20 VS 9.0/28.4 stable Gastric biopsy results    LA Grade A esophagitis.  - Acute gastritis. Biopsies taken to r/o CMV,    No ulceration seen despite finding on capsule endoscopy - likely 2/2 scope  trauma that has since resolved.  Pathology pending.

## 2020-08-20 NOTE — PROGRESS NOTE ADULT - PROBLEM SELECTOR PLAN 1
Heart Transplant Team Following: f/u recs   -C/w everolimus 0.75mg BID (level Goal 8-10)   -C/w Prednisone taper   -C/w posaconazole for ppx  -Continue to hold bactrim/valcyte.   -Off Cellcept while on high dose steroids. Will eventually need to start Cellcept as steroids weaned.

## 2020-08-20 NOTE — PROGRESS NOTE ADULT - PROBLEM SELECTOR PLAN 1
NON-EXUDATIVE AMD, OU:  PROGRESSION OF RPE ATROPHY OS; CONTINUE AREDS 2 VITAMINS / AMSLER GRID QD/ UV PROTECTION. SMOKING AVOIDANCE REVIEWED. RETURN FOR FOLLOW-UP AS SCHEDULED. C/w amiodarone, po diuretics, coreg, colchicine and protonix gtt  monitor H/H  anticoagulation

## 2020-08-20 NOTE — PROGRESS NOTE ADULT - PROBLEM SELECTOR PLAN 2
-S/p mult pRBC this admission  -Repeat EGD/push enteroscopy w/ angioectasias/erosions. No e/o continued GIB.  -GI cleared use of ASA, but will hold for now given GIB and lack of strong indication  -Appreciate heme recs, no signs of hemolysis at this time  -Start oral iron   -Tapering prednisone per hematology: 40 mg daily followed by 10 mg decrease every 7 days (will need taper to 30mg tomorrow)

## 2020-08-20 NOTE — PROGRESS NOTE ADULT - ASSESSMENT
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 He has responded appropriately to a single blood transfusion. Of note, he recently has developed dark colored stools. Will investigate if anemia is due to GI bleeding in setting of steroid use or hemolysis and manage appropriately. Underwent UGI without bleeding source identified  Developed gram negative sepsis requiring transfer to CTU  Clinically improving    #Klebsiella oxytoca bacteremia:  Meropenem (8/14-8/17)  Cefepime (8/17-)  - Growing in blood cultures 2/2 sets on 8/13  - Unclear source  - Repeat blood cultures x2 sets 8/14 no growth final  - Can change Cefepime to Ceftriaxone 2g q24h  - tentative plan is to continue until Monday 8/24 and finish a 10-day course    #OI prophylaxis-  -has been receiving high dose steroids since 5/20  -Follow up CMV viral load  -Valcyte and Bactrim d/c'ed on 8/17 due to leukopenia  -Galactomann<0.5, Fungitell<31, likely can stop posaconazole in the future but will keep it for now to keep the everolimus level steady to avoid organ rejection  -c/w Atovaquone 1500mg daily for PCP ppx given on steroid (should d/c with atovaquone outpt while on steroid)  -Per hemoc, will taper prednisone every 7 days by 10mg    #Pancytopenia- on admission  -Remains with significant leukopenia- especially lymphocyte lines  -COVID PCR neg  -Tick panel neg  -would repeat chest CT scan to see status of prior RUL scarring vs. nodule once patient is stable    #Anemia- r/o GI bleeding    EGD 8/17: esophagitis, acute gastritis s/p biopsy, no ulceration  Consider PPI or H2b    Lobo Mckoy MD, PGY4   ID fellow  Pager: [phone]  After 5pm/weekends call [phone]    d/w Dr. 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 He has responded appropriately to a single blood transfusion. Of note, he recently has developed dark colored stools. Will investigate if anemia is due to GI bleeding in setting of steroid use or hemolysis and manage appropriately. Underwent UGI without bleeding source identified  Developed gram negative sepsis requiring transfer to CTU  Clinically improving    #Klebsiella oxytoca bacteremia:  Meropenem (8/14-8/17)  Cefepime (8/17-20)  Ceftriaxone (8/20-)  - Growing in blood cultures 2/2 sets on 8/13  - Unclear source  - Repeat blood cultures x2 sets 8/14 no growth final  - Change Cefepime to Ceftriaxone 2g q24h  - tentative plan is to continue until Monday 8/24 and finish a 10-day course    #OI prophylaxis-  -has been receiving high dose steroids since 5/20  -Follow up CMV viral load  -Valcyte and Bactrim d/c'ed on 8/17 due to leukopenia  -Galactomann<0.5, Fungitell<31, likely can stop posaconazole in the future but will keep it for now to keep the everolimus level steady to avoid organ rejection  -c/w Atovaquone 1500mg daily for PCP ppx given on steroid (should send home with atovaquone while on steroid)  -Per hemoc, will taper prednisone every 7 days by 10mg    #Pancytopenia- on admission  -Remains with significant leukopenia- especially lymphocyte lines  -COVID PCR neg  -Tick panel neg  -would repeat chest CT scan to see status of prior RUL scarring vs. nodule once patient is stable    #Anemia- r/o GI bleeding    EGD 8/17: esophagitis, acute gastritis s/p biopsy, no ulceration  Consider PPI or H2b    Lobo Mckoy MD, PGY4   ID fellow  Pager: [phone]  After 5pm/weekends call [phone]    d/w Dr. Lujan

## 2020-08-20 NOTE — PROGRESS NOTE ADULT - SUBJECTIVE AND OBJECTIVE BOX
INFECTIOUS DISEASES FOLLOW UP    This is a follow up note for this  70y Male with  Leukopenia  K.oxytoca bacteremia  Post OHT in 02/2018  On prednisone    Interval event:  - Tick panel negative  - Echo: not well visualized    ROS:  CONSTITUTIONAL:  No fever, good appetite  CARDIOVASCULAR:  No chest pain or palpitations  RESPIRATORY:  No dyspnea  GASTROINTESTINAL:  No nausea, vomiting, diarrhea, or abdominal pain  GENITOURINARY:  No dysuria  NEUROLOGIC:  No headache,     Allergies  No Known Allergies    ANTIMICROBIALS:    atovaquone Suspension 1500 daily  cefepime   IVPB 1000 every 8 hours  posaconazole DR Tablet 300 daily      OTHER MEDS:  MEDICATIONS  (STANDING):  acetaminophen   Tablet .. 650 every 6 hours PRN  enoxaparin Injectable 40 daily  everolimus (ZORTRESS) 0.75 <User Schedule>  hydrALAZINE 75 every 8 hours  insulin glargine Injectable (LANTUS) 14 at bedtime  insulin lispro (HumaLOG) corrective regimen sliding scale  three times a day before meals  insulin lispro Injectable (HumaLOG) 5 three times a day with meals  pantoprazole  Injectable 40 every 12 hours  pravastatin 20 at bedtime  predniSONE   Tablet 40 daily    Vital Signs Last 24 Hrs  T(F): 98.1 (08-20-20 @ 04:43), Max: 99.9 (08-14-20 @ 00:00)    Vital Signs Last 24 Hrs  HR: 122 (08-20-20 @ 04:43) (122 - 147)  BP: 123/89 (08-20-20 @ 04:43) (116/77 - 139/90)  RR: 18 (08-20-20 @ 04:43)  SpO2: 96% (08-20-20 @ 04:43) (94% - 98%)  Wt(kg): --      PHYSICAL EXAM:  Constitutional:no acute distress  Eyes:WALT, EOMI  Ear/Nose/Throat: no oral lesions, 	  Respiratory: clear BL sternotomy healed  Cardiovascular: S1S2  Gastrointestinal:soft, (+) BS, no tenderness  Extremities:no e/e/c  No Lymphadenopathy  IV sites not inflammed.    Labs:                        9.0    2.66  )-----------( 163      ( 20 Aug 2020 07:42 )             28.4     08-20    137  |  103  |  29<H>  ----------------------------<  109<H>  4.2   |  23  |  1.03    Ca    8.7      20 Aug 2020 07:42        Auto Eosinophil %: 0.9 % (08-17-20 @ 12:50)      WBC Trend:  WBC Count: 2.66 (08-20-20 @ 07:42)  WBC Count: 3.00 (08-19-20 @ 06:01)  WBC Count: 2.59 (08-18-20 @ 00:33)  WBC Count: 2.08 (08-17-20 @ 12:50)  WBC Count: 1.81 (08-17-20 @ 00:45)  WBC Count: 2.84 (08-16-20 @ 01:03)      Creatine Trend:  Creatinine, Serum: 1.03 (08-20)  Creatinine, Serum: 1.11 (08-19)  Creatinine, Serum: 1.06 (08-18)  Creatinine, Serum: 1.14 (08-17)  Creatinine, Serum: 1.40 (08-16)  Creatinine, Serum: 1.32 (08-15)  Creatinine, Serum: 1.37 (08-13)  Creatinine, Serum: 1.07 (08-13)      Liver Biochemical Testing Trend:  Alanine Aminotransferase (ALT/SGPT): 28 (08-18)  Aspartate Aminotransferase (AST/SGOT): 32 (08-18-20 @ 00:33)  Bilirubin Total, Serum: 0.4 (08-18)    MICROBIOLOGY:    Tick-Borne Disease Antibodies Panel, Serum (08.18.20 @ 15:13)    Ehrlichia Chaffeensis (HME) Ab, IgG: <1:64    Anaplasma Phagocytophilum Ab, IgG: <1:64    Babesia Microti IgG Ab: <1:64    Lyme Disease Serology: Negative: No evidence of antibodies to B. burgdorferi detected.    Culture - Urine (collected 14 Aug 2020 13:32)  Source: .Urine Clean Catch (Midstream)  Final Report (15 Aug 2020 11:59):    No growth    Culture - Blood (collected 14 Aug 2020 01:32)  Source: .Blood Blood  Preliminary Report (15 Aug 2020 02:02):    No growth    Culture - Blood (collected 14 Aug 2020 01:32)  Source: .Blood Blood  Preliminary Report (15 Aug 2020 02:02):    No growth    Culture - Blood (collected 13 Aug 2020 14:48)  Source: .Blood Blood-Peripheral  Gram Stain (14 Aug 2020 02:20):    Growth in anaerobic bottle: Gram Negative Rods    Growth in aerobic bottle: Gram Negative Rods  Final Report (15 Aug 2020 17:30):    Growth in aerobic and anaerobic bottles: Klebsiella oxytoca/Raoutella    ornithinolytica    See previous culture [id number]    Culture - Blood in AM (08.13.20 @ 12:14)    Gram Stain:   Growth in anaerobic bottle: Gram Negative Rods    -  Levofloxacin: S <=0.5    -  Meropenem: S <=1    -  Klebsiella oxytoca: Detec    -  Ampicillin/Sulbactam: I 16/8 Enterobacter, Citrobacter, and Serratia may develop resistance during prolonged therapy (3-4 days)    -  Ceftriaxone: S <=1 Enterobacter, Citrobacter, and Serratia may develop resistance during prolonged therapy    -  Aztreonam: S <=4    -  Ciprofloxacin: S <=0.25    -  Ertapenem: S <=0.5    -  Imipenem: S <=1    -  Cefepime: S <=2    -  Piperacillin/Tazobactam: S <=8    -  Tobramycin: S <=2    -  Amikacin: S <=16    -  Ampicillin: R >16 These ampicillin results predict results for amoxicillin    -  Gentamicin: S <=2    -  Cefoxitin: S <=8    -  Cefazolin: R >16 Enterobacter, Citrobacter, and Serratia may develop resistance during prolonged therapy (3-4 days)    -  Trimethoprim/Sulfamethoxazole: R >2/38    Specimen Source: .Blood Blood-Peripheral    Organism: Blood Culture PCR    Organism: Klebsiella oxytoca /Raoutella ornithinolytica    Culture Results:   Growth in anaerobic bottle: Klebsiella oxytoca/Raoutella ornithinolytica  "Due to technical problems, Proteus sp. will Not be reported as part of  the BCID panel until further notice"  ***Blood Panel PCR results on this specimen are available  approximately 3 hours after the Gram stain result.***  Gram stain, PCR, and/or culture results may not always  correspond due to difference in methodologies.    Aspergillus Galactomannan Antigen (08.14.20 @ 23:30)    Aspergillus Galactomannan Antigen: <0.500    Fungitell (08.15.20 @ 01:41)    Fungitell: <31    RADIOLOGY & ADDITIONAL STUDIES:    < from: Xray Chest 1 View- PORTABLE-Routine (08.17.20 @ 02:42) >  FINDINGS/  impression:  The size of the cardiomediastinal silhouette cannot be accurately assessed in this single projection.  There are no focal pulmonary consolidations.    Elevated right hemidiaphragm. Small right pleural effusion and right basilar atelectasis. Hazy opacities throughout the left lung.    < end of copied text > INFECTIOUS DISEASES FOLLOW UP    This is a follow up note for this  70y Male with  Leukopenia  K.oxytoca bacteremia  Post OHT in 02/2018  On prednisone    Interval event:  - Tick panel negative  - Echo: not well visualized  - Got morning dose of Cefepime, will change to Ceftriaxone tonight    ROS:  CONSTITUTIONAL:  No fever, good appetite  CARDIOVASCULAR:  No chest pain or palpitations  RESPIRATORY:  No dyspnea  GASTROINTESTINAL:  No nausea, vomiting, diarrhea, or abdominal pain  GENITOURINARY:  No dysuria  NEUROLOGIC:  No headache,     Allergies  No Known Allergies    ANTIMICROBIALS:    atovaquone Suspension 1500 daily  cefTRIAXone   IVPB 2000 every 24 hours  posaconazole DR Tablet 300 daily    OTHER MEDS:  MEDICATIONS  (STANDING):  acetaminophen   Tablet .. 650 every 6 hours PRN  enoxaparin Injectable 40 daily  everolimus (ZORTRESS) 0.75 <User Schedule>  hydrALAZINE 75 <User Schedule>  insulin glargine Injectable (LANTUS) 14 at bedtime  insulin lispro (HumaLOG) corrective regimen sliding scale  three times a day before meals  insulin lispro Injectable (HumaLOG) 5 three times a day with meals  pantoprazole  Injectable 40 every 12 hours  pravastatin 20 at bedtime        Vital Signs Last 24 Hrs  T(F): 98.1 (08-20-20 @ 04:43), Max: 99.9 (08-14-20 @ 00:00)    Vital Signs Last 24 Hrs  HR: 122 (08-20-20 @ 04:43) (122 - 147)  BP: 123/89 (08-20-20 @ 04:43) (116/77 - 139/90)  RR: 18 (08-20-20 @ 04:43)  SpO2: 96% (08-20-20 @ 04:43) (94% - 98%)  Wt(kg): --      PHYSICAL EXAM:  Constitutional:no acute distress  Eyes:WALT, EOMI  Ear/Nose/Throat: no oral lesions, 	  Respiratory: clear BL sternotomy healed  Cardiovascular: S1S2  Gastrointestinal:soft, (+) BS, no tenderness  Extremities:no e/e/c  No Lymphadenopathy  IV sites not inflammed.    Labs:                        9.0    2.66  )-----------( 163      ( 20 Aug 2020 07:42 )             28.4     08-20    137  |  103  |  29<H>  ----------------------------<  109<H>  4.2   |  23  |  1.03    Ca    8.7      20 Aug 2020 07:42        Auto Eosinophil %: 0.9 % (08-17-20 @ 12:50)      WBC Trend:  WBC Count: 2.66 (08-20-20 @ 07:42)  WBC Count: 3.00 (08-19-20 @ 06:01)  WBC Count: 2.59 (08-18-20 @ 00:33)  WBC Count: 2.08 (08-17-20 @ 12:50)  WBC Count: 1.81 (08-17-20 @ 00:45)  WBC Count: 2.84 (08-16-20 @ 01:03)      Creatine Trend:  Creatinine, Serum: 1.03 (08-20)  Creatinine, Serum: 1.11 (08-19)  Creatinine, Serum: 1.06 (08-18)  Creatinine, Serum: 1.14 (08-17)  Creatinine, Serum: 1.40 (08-16)  Creatinine, Serum: 1.32 (08-15)  Creatinine, Serum: 1.37 (08-13)  Creatinine, Serum: 1.07 (08-13)      Liver Biochemical Testing Trend:  Alanine Aminotransferase (ALT/SGPT): 28 (08-18)  Aspartate Aminotransferase (AST/SGOT): 32 (08-18-20 @ 00:33)  Bilirubin Total, Serum: 0.4 (08-18)    MICROBIOLOGY:    Tick-Borne Disease Antibodies Panel, Serum (08.18.20 @ 15:13)    Ehrlichia Chaffeensis (HME) Ab, IgG: <1:64    Anaplasma Phagocytophilum Ab, IgG: <1:64    Babesia Microti IgG Ab: <1:64    Lyme Disease Serology: Negative: No evidence of antibodies to B. burgdorferi detected.    Culture - Urine (collected 14 Aug 2020 13:32)  Source: .Urine Clean Catch (Midstream)  Final Report (15 Aug 2020 11:59):    No growth    Culture - Blood (collected 14 Aug 2020 01:32)  Source: .Blood Blood  Preliminary Report (15 Aug 2020 02:02):    No growth    Culture - Blood (collected 14 Aug 2020 01:32)  Source: .Blood Blood  Preliminary Report (15 Aug 2020 02:02):    No growth    Culture - Blood (collected 13 Aug 2020 14:48)  Source: .Blood Blood-Peripheral  Gram Stain (14 Aug 2020 02:20):    Growth in anaerobic bottle: Gram Negative Rods    Growth in aerobic bottle: Gram Negative Rods  Final Report (15 Aug 2020 17:30):    Growth in aerobic and anaerobic bottles: Klebsiella oxytoca/Raoutella    ornithinolytica    [id number]    Culture - Blood in AM (08.13.20 @ 12:14)    Gram Stain:   Growth in anaerobic bottle: Gram Negative Rods    -  Levofloxacin: S <=0.5    -  Meropenem: S <=1    -  Klebsiella oxytoca: Detec    -  Ampicillin/Sulbactam: I 16/8 Enterobacter, Citrobacter, and Serratia may develop resistance during prolonged therapy (3-4 days)    -  Ceftriaxone: S <=1 Enterobacter, Citrobacter, and Serratia may develop resistance during prolonged therapy    -  Aztreonam: S <=4    -  Ciprofloxacin: S <=0.25    -  Ertapenem: S <=0.5    -  Imipenem: S <=1    -  Cefepime: S <=2    -  Piperacillin/Tazobactam: S <=8    -  Tobramycin: S <=2    -  Amikacin: S <=16    -  Ampicillin: R >16 These ampicillin results predict results for amoxicillin    -  Gentamicin: S <=2    -  Cefoxitin: S <=8    -  Cefazolin: R >16 Enterobacter, Citrobacter, and Serratia may develop resistance during prolonged therapy (3-4 days)    -  Trimethoprim/Sulfamethoxazole: R >2/38    Specimen Source: .Blood Blood-Peripheral    Organism: Blood Culture PCR    Organism: Klebsiella oxytoca /Raoutella ornithinolytica    Culture Results:   Growth in anaerobic bottle: Klebsiella oxytoca/Raoutella ornithinolytica  "Due to technical problems, Proteus sp. will Not be reported as part of  the BCID panel until further notice"  ***Blood Panel PCR results on this specimen are available  approximately 3 hours after the Gram stain result.***  Gram stain, PCR, and/or culture results may not always  correspond due to difference in methodologies.    Aspergillus Galactomannan Antigen (08.14.20 @ 23:30)    Aspergillus Galactomannan Antigen: <0.500    Fungitell (08.15.20 @ 01:41)    Fungitell: <31    RADIOLOGY & ADDITIONAL STUDIES:    < from: Xray Chest 1 View- PORTABLE-Routine (08.17.20 @ 02:42) >  FINDINGS/  impression:  The size of the cardiomediastinal silhouette cannot be accurately assessed in this single projection.  There are no focal pulmonary consolidations.    Elevated right hemidiaphragm. Small right pleural effusion and right basilar atelectasis. Hazy opacities throughout the left lung.    < end of copied text >

## 2020-08-21 ENCOUNTER — APPOINTMENT (OUTPATIENT)
Dept: ENDOCRINOLOGY | Facility: CLINIC | Age: 70
End: 2020-08-21

## 2020-08-21 LAB
ANION GAP SERPL CALC-SCNC: 9 MMOL/L — SIGNIFICANT CHANGE UP (ref 5–17)
BUN SERPL-MCNC: 26 MG/DL — HIGH (ref 7–23)
CALCIUM SERPL-MCNC: 8.8 MG/DL — SIGNIFICANT CHANGE UP (ref 8.4–10.5)
CHLORIDE SERPL-SCNC: 106 MMOL/L — SIGNIFICANT CHANGE UP (ref 96–108)
CMV DNA CSF QL NAA+PROBE: SIGNIFICANT CHANGE UP
CO2 SERPL-SCNC: 26 MMOL/L — SIGNIFICANT CHANGE UP (ref 22–31)
CREAT SERPL-MCNC: 1.18 MG/DL — SIGNIFICANT CHANGE UP (ref 0.5–1.3)
GLUCOSE BLDC GLUCOMTR-MCNC: 100 MG/DL — HIGH (ref 70–99)
GLUCOSE BLDC GLUCOMTR-MCNC: 150 MG/DL — HIGH (ref 70–99)
GLUCOSE BLDC GLUCOMTR-MCNC: 191 MG/DL — HIGH (ref 70–99)
GLUCOSE BLDC GLUCOMTR-MCNC: 98 MG/DL — SIGNIFICANT CHANGE UP (ref 70–99)
GLUCOSE SERPL-MCNC: 100 MG/DL — HIGH (ref 70–99)
HCT VFR BLD CALC: 25.7 % — LOW (ref 39–50)
HGB BLD-MCNC: 8.1 G/DL — LOW (ref 13–17)
MAGNESIUM SERPL-MCNC: 2.3 MG/DL — SIGNIFICANT CHANGE UP (ref 1.6–2.6)
MCHC RBC-ENTMCNC: 31.5 GM/DL — LOW (ref 32–36)
MCHC RBC-ENTMCNC: 31.6 PG — SIGNIFICANT CHANGE UP (ref 27–34)
MCV RBC AUTO: 100.4 FL — HIGH (ref 80–100)
NRBC # BLD: 1 /100 WBCS — HIGH (ref 0–0)
PHOSPHATE SERPL-MCNC: 2 MG/DL — LOW (ref 2.5–4.5)
PLATELET # BLD AUTO: 236 K/UL — SIGNIFICANT CHANGE UP (ref 150–400)
POTASSIUM SERPL-MCNC: 3.8 MMOL/L — SIGNIFICANT CHANGE UP (ref 3.5–5.3)
POTASSIUM SERPL-SCNC: 3.8 MMOL/L — SIGNIFICANT CHANGE UP (ref 3.5–5.3)
RBC # BLD: 2.56 M/UL — LOW (ref 4.2–5.8)
RBC # FLD: 18.8 % — HIGH (ref 10.3–14.5)
SODIUM SERPL-SCNC: 141 MMOL/L — SIGNIFICANT CHANGE UP (ref 135–145)
WBC # BLD: 3.97 K/UL — SIGNIFICANT CHANGE UP (ref 3.8–10.5)
WBC # FLD AUTO: 3.97 K/UL — SIGNIFICANT CHANGE UP (ref 3.8–10.5)

## 2020-08-21 PROCEDURE — 99233 SBSQ HOSP IP/OBS HIGH 50: CPT

## 2020-08-21 PROCEDURE — 99232 SBSQ HOSP IP/OBS MODERATE 35: CPT | Mod: GC

## 2020-08-21 PROCEDURE — 99232 SBSQ HOSP IP/OBS MODERATE 35: CPT

## 2020-08-21 RX ORDER — SULFAMETHOXAZOLE AND TRIMETHOPRIM 400; 80 MG/1; MG/1
400-80 TABLET ORAL DAILY
Qty: 30 | Refills: 5 | Status: DISCONTINUED | COMMUNITY
Start: 2020-05-29 | End: 2020-08-21

## 2020-08-21 RX ORDER — OMEGA-3-ACID ETHYL ESTERS CAPSULES 1 G/1
1 CAPSULE, LIQUID FILLED ORAL TWICE DAILY
Qty: 120 | Refills: 5 | Status: DISCONTINUED | COMMUNITY
Start: 2020-06-25 | End: 2020-08-21

## 2020-08-21 RX ORDER — SODIUM BICARBONATE 650 MG/1
650 TABLET ORAL TWICE DAILY
Qty: 60 | Refills: 5 | Status: DISCONTINUED | COMMUNITY
Start: 2018-06-28 | End: 2020-08-21

## 2020-08-21 RX ORDER — VALGANCICLOVIR HYDROCHLORIDE 450 MG/1
450 TABLET ORAL DAILY
Qty: 30 | Refills: 5 | Status: DISCONTINUED | COMMUNITY
Start: 2020-05-05 | End: 2020-08-21

## 2020-08-21 RX ORDER — PANTOPRAZOLE SODIUM 20 MG/1
40 TABLET, DELAYED RELEASE ORAL DAILY
Refills: 0 | Status: DISCONTINUED | OUTPATIENT
Start: 2020-08-22 | End: 2020-08-26

## 2020-08-21 RX ORDER — ASPIRIN 81 MG/1
81 TABLET ORAL
Qty: 30 | Refills: 5 | Status: DISCONTINUED | COMMUNITY
Start: 2018-04-12 | End: 2020-08-21

## 2020-08-21 RX ORDER — INSULIN LISPRO 100 [IU]/ML
100 INJECTION, SOLUTION INTRAVENOUS; SUBCUTANEOUS
Qty: 300 | Refills: 5 | Status: DISCONTINUED | COMMUNITY
Start: 2020-05-05 | End: 2020-08-21

## 2020-08-21 RX ADMIN — Medication 20 MILLIGRAM(S): at 22:11

## 2020-08-21 RX ADMIN — PANTOPRAZOLE SODIUM 40 MILLIGRAM(S): 20 TABLET, DELAYED RELEASE ORAL at 05:31

## 2020-08-21 RX ADMIN — Medication 2 GRAM(S): at 17:38

## 2020-08-21 RX ADMIN — ATOVAQUONE 1500 MILLIGRAM(S): 750 SUSPENSION ORAL at 11:26

## 2020-08-21 RX ADMIN — SODIUM CHLORIDE 3 MILLILITER(S): 9 INJECTION INTRAMUSCULAR; INTRAVENOUS; SUBCUTANEOUS at 05:28

## 2020-08-21 RX ADMIN — INSULIN GLARGINE 14 UNIT(S): 100 INJECTION, SOLUTION SUBCUTANEOUS at 22:11

## 2020-08-21 RX ADMIN — ENOXAPARIN SODIUM 40 MILLIGRAM(S): 100 INJECTION SUBCUTANEOUS at 11:28

## 2020-08-21 RX ADMIN — Medication 30 MILLIGRAM(S): at 07:38

## 2020-08-21 RX ADMIN — Medication 650 MILLIGRAM(S): at 05:31

## 2020-08-21 RX ADMIN — Medication 5 UNIT(S): at 17:19

## 2020-08-21 RX ADMIN — Medication 1 MILLIGRAM(S): at 11:25

## 2020-08-21 RX ADMIN — Medication 2 GRAM(S): at 05:31

## 2020-08-21 RX ADMIN — Medication 5 UNIT(S): at 08:07

## 2020-08-21 RX ADMIN — CEFTRIAXONE 100 MILLIGRAM(S): 500 INJECTION, POWDER, FOR SOLUTION INTRAMUSCULAR; INTRAVENOUS at 12:44

## 2020-08-21 RX ADMIN — Medication 2: at 17:18

## 2020-08-21 RX ADMIN — Medication 120 MILLIGRAM(S): at 07:38

## 2020-08-21 RX ADMIN — EVEROLIMUS 0.75 MILLIGRAM(S): 10 TABLET ORAL at 19:55

## 2020-08-21 RX ADMIN — SODIUM CHLORIDE 3 MILLILITER(S): 9 INJECTION INTRAMUSCULAR; INTRAVENOUS; SUBCUTANEOUS at 13:08

## 2020-08-21 RX ADMIN — Medication 5 UNIT(S): at 11:40

## 2020-08-21 RX ADMIN — EVEROLIMUS 0.75 MILLIGRAM(S): 10 TABLET ORAL at 07:37

## 2020-08-21 RX ADMIN — POSACONAZOLE 300 MILLIGRAM(S): 100 TABLET, DELAYED RELEASE ORAL at 11:23

## 2020-08-21 RX ADMIN — Medication 1000 UNIT(S): at 11:26

## 2020-08-21 RX ADMIN — SODIUM CHLORIDE 3 MILLILITER(S): 9 INJECTION INTRAMUSCULAR; INTRAVENOUS; SUBCUTANEOUS at 22:10

## 2020-08-21 RX ADMIN — MAGNESIUM OXIDE 400 MG ORAL TABLET 400 MILLIGRAM(S): 241.3 TABLET ORAL at 11:25

## 2020-08-21 NOTE — PROGRESS NOTE ADULT - PROBLEM SELECTOR PLAN 1
Heart Transplant Team Following: f/u recs   -C/w everolimus 0.75mg BID (level Goal 8-10)   -C/w Prednisone taper  30 mg   -C/w posaconazole for ppx  -Continue to hold bactrim/valcyte.   -Off Cellcept while on high dose steroids. Will eventually need to start Cellcept as steroids weaned.

## 2020-08-21 NOTE — PROGRESS NOTE ADULT - ASSESSMENT
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 Patient was treated w/ prednisone and Rituximab. Tacro was discontinued and patient was also transitioned to everolimus. Admitted again 6/16-6/19 for hyperglycemia. On 8/11 Reported to transplant team having one done black tarry stool-  instructed to present to [facility] for hemolytic anemia. Patient has been following with Hematology outpatient.  Denies CP  N/V diarrhea SOB. (11 Aug 2020 20:08)      Surgery/Hospital Course:  8/11 Admitted to [facility] with symptomatic anemia  8/13 EGD for investigation of tarry stools  8/14 CTU for SVT and respiratory distress   8/15 SB capsule: stomach & SB lesions, likely ulcers.  8/17 EGD/push enteroscopy w/ angioectasias/erosions in small bowel. Gastropathy and esophagitis. Ulcer seen on VCE most likely scope trauma.    8/18 VSS transferred back to floor.   8/19 VSS, H/H stable 8.8/24.5, would continue to hold asa per transplant team. Follow up CMV PCR and gastric biopsy.   8/20 VS 9.0/28.4 stable Gastric biopsy results    LA Grade A esophagitis.  - Acute gastritis. Biopsies taken to r/o CMV,    No ulceration seen despite finding on capsule endoscopy - likely 2/2 scope  trauma that has since resolved.  Pathology pending.  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 Patient was treated w/ prednisone and Rituximab. Tacro was discontinued and patient was also transitioned to everolimus. Admitted again 6/16-6/19 for hyperglycemia. On 8/11 Reported to transplant team having one done black tarry stool-  instructed to present to [facility] for hemolytic anemia. Patient has been following with Hematology outpatient.  Denies CP  N/V diarrhea SOB. (11 Aug 2020 20:08)      Surgery/Hospital Course:  8/11 Admitted to [facility] with symptomatic anemia  8/13 EGD for investigation of tarry stools  8/14 CTU for SVT and respiratory distress   8/15 SB capsule: stomach & SB lesions, likely ulcers.  8/17 EGD/push enteroscopy w/ angioectasias/erosions in small bowel. Gastropathy and esophagitis. Ulcer seen on VCE most likely scope trauma.    8/18 VSS transferred back to floor.   8/19 VSS, H/H stable 8.8/24.5, would continue to hold asa per transplant team. Follow up CMV PCR and gastric biopsy.   8/20 VS 9.0/28.4 stable Gastric biopsy results    LA Grade A esophagitis.  - Acute gastritis. Biopsies taken to r/o CMV,    No ulceration seen despite finding on capsule endoscopy - likely 2/2 scope  trauma that has since resolved.  Pathology pending.  8/21 VSS H/H  8.1/25.7  trending down will monitor  prednisone taper  30 mg today Procardia 120  initiated  today [facility] biopsy Monday

## 2020-08-21 NOTE — PROGRESS NOTE ADULT - PROBLEM SELECTOR PLAN 2
-S/p mult pRBC this admission  -Repeat EGD/push enteroscopy w/ angioectasias/erosions. No e/o continued GIB.  - Reconsult GI regarding abdominal distention. Abd xray.  -GI cleared use of ASA, but will hold for now given GIB and lack of strong indication  -Appreciate heme recs, no signs of hemolysis at this time  -Continue oral iron   -Tapering prednisone per hematology: 30 mg daily (started 8/21) followed by 10 mg decrease every 7 days  - Decrease PPI to daily. Discuss with GI when can change to PO.

## 2020-08-21 NOTE — PROGRESS NOTE ADULT - SUBJECTIVE AND OBJECTIVE BOX
Subjective:    Medications:  acetaminophen   Tablet .. 650 milliGRAM(s) Oral every 6 hours PRN  atovaquone Suspension 1500 milliGRAM(s) Oral daily  cefTRIAXone   IVPB 2000 milliGRAM(s) IV Intermittent every 24 hours  cholecalciferol 1000 Unit(s) Oral daily  dextrose 5%. 1000 milliLiter(s) IV Continuous <Continuous>  enoxaparin Injectable 40 milliGRAM(s) SubCutaneous daily  everolimus (ZORTRESS) 0.75 milliGRAM(s) Oral <User Schedule>  folic acid 1 milliGRAM(s) Oral daily  insulin glargine Injectable (LANTUS) 14 Unit(s) SubCutaneous at bedtime  insulin lispro (HumaLOG) corrective regimen sliding scale   SubCutaneous three times a day before meals  insulin lispro Injectable (HumaLOG) 5 Unit(s) SubCutaneous three times a day with meals  magnesium oxide 400 milliGRAM(s) Oral daily  NIFEdipine  milliGRAM(s) Oral <User Schedule>  omega-3-Acid Ethyl Esters 2 Gram(s) Oral two times a day  pantoprazole  Injectable 40 milliGRAM(s) IV Push every 12 hours  posaconazole DR Tablet 300 milliGRAM(s) Oral daily  pravastatin 20 milliGRAM(s) Oral at bedtime  predniSONE   Tablet 30 milliGRAM(s) Oral <User Schedule>  sodium bicarbonate 650 milliGRAM(s) Oral two times a day  sodium chloride 0.9% lock flush 3 milliLiter(s) IV Push every 8 hours      Physical Exam:    Vitals:  Vital Signs Last 24 Hours  T(C): 36.6 (20 @ 05:15), Max: 36.7 (20 @ 13:59)  HR: 118 (20 @ 05:15) (118 - 123)  BP: 127/89 (20 @ 05:15) (125/85 - 130/83)  RR: 18 (20 @ 05:15) (18 - 18)  SpO2: 97% (20 @ 05:15) (96% - 98%)    Weight in k.4 ( @ 07:22)    I&O's Summary    20 Aug 2020 07:01  -  21 Aug 2020 07:00  --------------------------------------------------------  IN: 1090 mL / OUT: 1950 mL / NET: -860 mL        Tele:    General: No distress. Comfortable.  HEENT: EOM intact.  Neck: Neck supple. JVP not elevated. No masses  Chest: Clear to auscultation bilaterally  CV: Normal S1 and S2. No murmurs, rub, or gallops. Radial pulses normal.  Abdomen: Soft, non-distended, non-tender  Skin: No rashes or skin breakdown  Neurology: Alert and oriented times three. Sensation intact  Psych: Affect normal    Labs:                        8.1    3.97  )-----------( x        ( 21 Aug 2020 08:17 )             25.7     08-    141  |  106  |  26<H>  ----------------------------<  100<H>  3.8   |  26  |  1.18    Ca    8.8      21 Aug 2020 08:17  Phos  2.0       Mg     2.3      Subjective:  - Notes abdominal distention. Denies abdominal pain. BM today he reports was normal. Denies melena, hematochezia  - Denies SOB, orthopnea, lightheadedness, nausea, fever, chills    Medications:  acetaminophen   Tablet .. 650 milliGRAM(s) Oral every 6 hours PRN  atovaquone Suspension 1500 milliGRAM(s) Oral daily  cefTRIAXone   IVPB 2000 milliGRAM(s) IV Intermittent every 24 hours  cholecalciferol 1000 Unit(s) Oral daily  dextrose 5%. 1000 milliLiter(s) IV Continuous <Continuous>  enoxaparin Injectable 40 milliGRAM(s) SubCutaneous daily  everolimus (ZORTRESS) 0.75 milliGRAM(s) Oral <User Schedule>  folic acid 1 milliGRAM(s) Oral daily  insulin glargine Injectable (LANTUS) 14 Unit(s) SubCutaneous at bedtime  insulin lispro (HumaLOG) corrective regimen sliding scale   SubCutaneous three times a day before meals  insulin lispro Injectable (HumaLOG) 5 Unit(s) SubCutaneous three times a day with meals  magnesium oxide 400 milliGRAM(s) Oral daily  NIFEdipine  milliGRAM(s) Oral <User Schedule>  omega-3-Acid Ethyl Esters 2 Gram(s) Oral two times a day  pantoprazole  Injectable 40 milliGRAM(s) IV Push every 12 hours  posaconazole DR Tablet 300 milliGRAM(s) Oral daily  pravastatin 20 milliGRAM(s) Oral at bedtime  predniSONE   Tablet 30 milliGRAM(s) Oral <User Schedule>  sodium bicarbonate 650 milliGRAM(s) Oral two times a day  sodium chloride 0.9% lock flush 3 milliLiter(s) IV Push every 8 hours      Physical Exam:    Vitals:  Vital Signs Last 24 Hours  T(C): 36.6 (20 @ 05:15), Max: 36.7 (20 @ 13:59)  HR: 118 (20 @ 05:15) (118 - 123)  BP: 127/89 (20 @ 05:15) (125/85 - 130/83)  RR: 18 (20 @ 05:15) (18 - 18)  SpO2: 97% (20 @ 05:15) (96% - 98%)    Weight in k.4 (08-21 @ 07:22)    I&O's Summary    20 Aug 2020 07:01  -  21 Aug 2020 07:00  --------------------------------------------------------  IN: 1090 mL / OUT: 1950 mL / NET: -860 mL    Tele: -130, generally 110-120s    General: No distress. Comfortable.  HEENT: EOM intact.  Neck: Neck supple. JVP not elevated. No masses  Chest: Clear to auscultation bilaterally  CV: Tachycardic. Normal S1 and S2. No murmurs, rub, or gallops. Radial pulses normal. No LE edema  Abdomen: Soft, mildly-distended, non-tender, normoactive BS  Skin: No rashes or skin breakdown  Neurology: Alert and oriented times three. Sensation intact  Psych: Affect normal    Labs:                        8.1    3.97  )-----------( x        ( 21 Aug 2020 08:17 )             25.7     08    141  |  106  |  26<H>  ----------------------------<  100<H>  3.8   |  26  |  1.18    Ca    8.8      21 Aug 2020 08:17  Phos  2.0       Mg     2.3

## 2020-08-21 NOTE — PROGRESS NOTE ADULT - ATTENDING COMMENTS
no events. c/o abdo distention   meds; evero .75 bid, pred 30 bid, nifedipine 120, hdnz off, mepron, ceftriaxone 2g Q 24, posi, lovanox, prava. protonix.   afebrile, 110-130, 120/,   I/O -860.   08-21    141  |  106  |  26<H>  ----------------------------<  100<H>  3.8   |  26  |  1.18  Ca    8.8      21 Aug 2020 08:17  Phos  2.0     08-21  Mg     2.3     08-21                        8.1    3.97  )-----------( 236      ( 21 Aug 2020 08:17 )             25.7   CMV PCR: pending.   Will ask for AXR and for GI to return to examine patient.   follow BP on nifedipine of hdzn.   Abs till monday.  Bx monday, d/c tuesday.   Marvin Campbell

## 2020-08-21 NOTE — PROGRESS NOTE ADULT - ASSESSMENT
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 He has responded appropriately to a single blood transfusion. Of note, he recently has developed dark colored stools. Will investigate if anemia is due to GI bleeding in setting of steroid use or hemolysis and manage appropriately. Underwent UGI without bleeding source identified  Developed gram negative sepsis requiring transfer to CTU  Clinically improving    #Klebsiella oxytoca bacteremia:  Meropenem (8/14-8/17)  Cefepime (8/17-20)  Ceftriaxone (8/20-)  - Growing in blood cultures 2/2 sets on 8/13  - Unclear source  - Repeat blood cultures x2 sets 8/14 no growth final  - tentative plan is to continue ceftriaxone until Monday 8/24 to finish a 10-day course    #OI prophylaxis-  -has been receiving high dose steroids since 5/20  -Follow up CMV viral load  -Valcyte and Bactrim d/c'ed on 8/17 due to leukopenia  -Galactomann<0.5, Fungitell<31, likely can stop posaconazole in the future but will keep it for now to keep the everolimus level steady to avoid organ rejection  -c/w Atovaquone 1500mg daily for PCP ppx given on steroid (should send home with atovaquone while on steroid)  -Per hemoc, will taper prednisone every 7 days by 10mg    #Pancytopenia- on admission  -Remains with significant leukopenia- especially lymphocyte lines  -COVID PCR neg  -Tick panel neg  -would repeat chest CT scan to see status of prior RUL scarring vs. nodule once patient is stable    #Anemia- r/o GI bleeding    EGD 8/17: esophagitis, acute gastritis s/p biopsy, no ulceration  Consider PPI or H2b    Lobo Mckoy MD, PGY4   ID fellow  Pager: [phone]  After 5pm/weekends call [phone]    d/w Dr. Lujan 71 YO M with a history of ACC/AHA Stage D NICM s/p OHT 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 He has responded appropriately to a single blood transfusion. Of note, he recently has developed dark colored stools. Will investigate if anemia is due to GI bleeding in setting of steroid use or hemolysis and manage appropriately. Underwent UGI without bleeding source identified  Developed gram negative sepsis requiring transfer to CTU  Clinically improving    #Klebsiella oxytoca bacteremia:  Meropenem (8/14-8/17)  Cefepime (8/17-20)  Ceftriaxone (8/20-)  - Growing in blood cultures 2/2 sets on 8/13  - Unclear source  - Repeat blood cultures x2 sets 8/14 no growth final  - tentative plan is to continue ceftriaxone until Monday 8/24 to finish a 10-day course    #OI prophylaxis-  -has been receiving high dose steroids since 5/20  -CMV viral load(8/17): neg  -Valcyte and Bactrim d/c'ed on 8/17 due to leukopenia  -Galactomann<0.5, Fungitell<31, likely can stop posaconazole in the future but will keep it for now to keep the everolimus level steady to avoid organ rejection  -c/w Atovaquone 1500mg daily for PCP ppx given on steroid (should send home with atovaquone while on steroid)  -Per hemoc, will taper prednisone every 7 days by 10mg    #Pancytopenia- on admission  -Remains with significant leukopenia- especially lymphocyte lines  -COVID PCR neg  -Tick panel neg  -would repeat chest CT scan to see status of prior RUL scarring vs. nodule once patient is stable    #Anemia- r/o GI bleeding    EGD 8/17: esophagitis, acute gastritis s/p biopsy, no ulceration  Consider PPI or H2b    Lobo Mckoy MD, PGY4   ID fellow  Pager: [phone]  After 5pm/weekends call [phone]    d/w Dr. Lujan

## 2020-08-21 NOTE — PROGRESS NOTE ADULT - PROBLEM SELECTOR PLAN 1
-C/w statin for TCAD ppx  -ASA on hold given GIB  -C/w everolimus 0.75mg BID, will follow levels periodically while hospitalized (true trough in lab). Goal 8-10. Results from level sent on 8/20 pending.  -Continue high dose prednisone which is being weaned as below   -C/w posiconazole for ppx; Bactrim and Valcyte on hold given leukopenia  -Off Cellcept while on high dose steroids. Will eventually need to start Cellcept as steroids weaned  -Repeat TTE reviewed; unremarkable  -Given persistent tachy, favor repeat EMB on Mon. Will need to hold DVT ppx in AM prior.  - Discontinue sodium bicarb

## 2020-08-21 NOTE — PROGRESS NOTE ADULT - SUBJECTIVE AND OBJECTIVE BOX
Subjective ' Hello I am feeling good except my stomach"    VITAL SIGNS    Telemetry:       Vital Signs Last 24 Hrs  T(C): 36.6 (20 @ 05:15), Max: 36.7 (20 @ 13:59)  T(F): 97.8 (20 @ 05:15), Max: 98.1 (20 @ 13:59)  HR: 118 (20 @ 05:15) (118 - 123)  BP: 127/89 (20 @ 05:15) (125/85 - 130/83)  RR: 18 (20 @ 05:15) (18 - 18)  SpO2: 97% (20 @ 05:15) (96% - 98%)            @ 07:01  -   @ 07:00  --------------------------------------------------------  IN: 1090 mL / OUT: 1950 mL / NET: -860 mL  Daily Weight in k.4 (21 Aug 2020 07:22)  MEDICATIONS  (STANDING):  atovaquone Suspension 1500 milliGRAM(s) Oral daily  cefTRIAXone   IVPB 2000 milliGRAM(s) IV Intermittent every 24 hours  cholecalciferol 1000 Unit(s) Oral daily  dextrose 5%. 1000 milliLiter(s) (50 mL/Hr) IV Continuous <Continuous>  enoxaparin Injectable 40 milliGRAM(s) SubCutaneous daily  everolimus (ZORTRESS) 0.75 milliGRAM(s) Oral <User Schedule>  folic acid 1 milliGRAM(s) Oral daily  insulin glargine Injectable (LANTUS) 14 Unit(s) SubCutaneous at bedtime  insulin lispro (HumaLOG) corrective regimen sliding scale   SubCutaneous three times a day before meals  insulin lispro Injectable (HumaLOG) 5 Unit(s) SubCutaneous three times a day with meals  magnesium oxide 400 milliGRAM(s) Oral daily  NIFEdipine  milliGRAM(s) Oral <User Schedule>  omega-3-Acid Ethyl Esters 2 Gram(s) Oral two times a day  pantoprazole  Injectable 40 milliGRAM(s) IV Push every 12 hours  posaconazole DR Tablet 300 milliGRAM(s) Oral daily  pravastatin 20 milliGRAM(s) Oral at bedtime  predniSONE   Tablet 30 milliGRAM(s) Oral <User Schedule>  sodium bicarbonate 650 milliGRAM(s) Oral two times a day  sodium chloride 0.9% lock flush 3 milliLiter(s) IV Push every 8 hours    MEDICATIONS  (PRN):  acetaminophen   Tablet .. 650 milliGRAM(s) Oral every 6 hours PRN Mild Pain (1 - 3)          CAPILLARY BLOOD GLUCOSE      POCT Blood Glucose.: 100 mg/dL (21 Aug 2020 07:56)  POCT Blood Glucose.: 103 mg/dL (20 Aug 2020 22:54)  POCT Blood Glucose.: 93 mg/dL (20 Aug 2020 22:09)  POCT Blood Glucose.: 72 mg/dL (20 Aug 2020 21:33)  POCT Blood Glucose.: 169 mg/dL (20 Aug 2020 16:58)  POCT Blood Glucose.: 139 mg/dL (20 Aug 2020 11:55)                                  PHYSICAL EXAM        Neurology: alert and oriented x 3, nonfocal, no gross deficits    CV :S1 S2 RRR    Sternal Wound :  healed  Stable    Lungs: B/l CTA on room air    Abdomen: soft, nontender, nondistended, positive bowel sounds,     : voids               Extremities:   equal strength throughout   B/lle warm well perfused + DP + edema                                       Physical Therapy Rec:   Home  [x]   Home w/ PT  [  ]  Rehab  [  ]    Discussed with Cardiothoracic Team at AM rounds.

## 2020-08-21 NOTE — PROGRESS NOTE ADULT - ATTENDING COMMENTS
Patient seen and examined with Dr. Mckoy    I agree with his interval history exam and plans as noted above    Feeling well  clinical exam unchanged    Continue IV Ceftriaxone 2 gram a day to complete a ten day course    Gary Lujan MD  [phone]  After 5pm/weekends [phone]

## 2020-08-21 NOTE — PROGRESS NOTE ADULT - SUBJECTIVE AND OBJECTIVE BOX
INFECTIOUS DISEASES FOLLOW UP    This is a follow up note for this  70y Male with  Leukopenia  K.oxytoca bacteremia  Post OHT in 02/2018  On prednisone    Interval event:  - Stomach distension and pain  - Persistent tachy  - On ceftriaxone solo    ANTIMICROBIALS:    atovaquone Suspension 1500 daily  cefTRIAXone   IVPB 2000 every 24 hours  posaconazole DR Tablet 300 daily    OTHER MEDS:  MEDICATIONS  (STANDING):  acetaminophen   Tablet .. 650 every 6 hours PRN  enoxaparin Injectable 40 daily  everolimus (ZORTRESS) 0.75 <User Schedule>  insulin glargine Injectable (LANTUS) 14 at bedtime  insulin lispro (HumaLOG) corrective regimen sliding scale  three times a day before meals  insulin lispro Injectable (HumaLOG) 5 three times a day with meals  NIFEdipine  <User Schedule>  pantoprazole  Injectable 40 every 12 hours  pravastatin 20 at bedtime  predniSONE   Tablet 30 <User Schedule>    ROS:  CONSTITUTIONAL:  No fever, good appetite  CARDIOVASCULAR:  No chest pain or palpitations  RESPIRATORY:  No dyspnea  GASTROINTESTINAL:  No nausea, vomiting, diarrhea, or abdominal pain  GENITOURINARY:  No dysuria  NEUROLOGIC:  No headache,     Allergies  No Known Allergies    Vital Signs Last 24 Hrs  T(F): 97.8 (08-21-20 @ 05:15), Max: 98.9 (08-18-20 @ 12:00)    Vital Signs Last 24 Hrs  HR: 118 (08-21-20 @ 05:15) (118 - 123)  BP: 127/89 (08-21-20 @ 05:15) (125/85 - 130/83)  RR: 18 (08-21-20 @ 05:15)  SpO2: 97% (08-21-20 @ 05:15) (96% - 98%)  Wt(kg): --    PHYSICAL EXAM:  Constitutional:no acute distress  Eyes:WALT, EOMI  Ear/Nose/Throat: no oral lesions, 	  Respiratory: clear BL sternotomy healed  Cardiovascular: S1S2  Gastrointestinal:soft, (+) BS, no tenderness  Extremities:no e/e/c  No Lymphadenopathy  IV sites not inflammed.      Labs:                        8.1    3.97  )-----------( 236      ( 21 Aug 2020 08:17 )             25.7     08-21    141  |  106  |  26<H>  ----------------------------<  100<H>  3.8   |  26  |  1.18    Ca    8.8      21 Aug 2020 08:17  Phos  2.0     08-21  Mg     2.3     08-21        WBC Trend:  WBC Count: 3.97 (08-21-20 @ 08:17)  WBC Count: 2.66 (08-20-20 @ 07:42)  WBC Count: 3.00 (08-19-20 @ 06:01)  WBC Count: 2.59 (08-18-20 @ 00:33)      Creatine Trend:  Creatinine, Serum: 1.18 (08-21)  Creatinine, Serum: 1.03 (08-20)  Creatinine, Serum: 1.11 (08-19)  Creatinine, Serum: 1.06 (08-18)      Liver Biochemical Testing Trend:      Trend LDH  06-17-20 @ 08:51  376<H>  06-17-20 @ 08:50  390<H>  06-02-20 @ 07:26  323<H>  06-01-20 @ 07:16  266<H>  05-31-20 @ 07:53  246<H>  05-30-20 @ 07:39  247<H>  05-29-20 @ 07:42  223  05-28-20 @ 07:45  216  05-27-20 @ 07:15  237  05-26-20 @ 08:29  256<H>  05-23-20 @ 11:15  253<H>  05-23-20 @ 07:47  258<H>          MICROBIOLOGY:    Tick-Borne Disease Antibodies Panel, Serum (08.18.20 @ 15:13)    Ehrlichia Chaffeensis (HME) Ab, IgG: <1:64    Anaplasma Phagocytophilum Ab, IgG: <1:64    Babesia Microti IgG Ab: <1:64    Lyme Disease Serology: Negative: No evidence of antibodies to B. burgdorferi detected.    Culture - Urine (collected 14 Aug 2020 13:32)  Source: .Urine Clean Catch (Midstream)  Final Report:    No growth    Culture - Blood (collected 14 Aug 2020 01:32)  Source: .Blood Blood  Final Report:    No Growth Final    Culture - Blood (collected 14 Aug 2020 01:32)  Source: .Blood Blood  Final Report:    No Growth Final    Culture - Blood (collected 13 Aug 2020 14:48)  Source: .Blood Blood-Peripheral  Final Report:    Growth in aerobic and anaerobic bottles: Klebsiella oxytoca/Raoutella    ornithinolytica    See previous culture [id number]    Culture - Blood (collected 13 Aug 2020 12:14)  Source: .Blood Blood-Peripheral  Final Report:    Growth in anaerobic bottle: Klebsiella oxytoca/Raoutella ornithinolytica    "Due to technical problems, Proteus sp. will Not be reported as part of    the BCID panel until further notice"    ***Blood Panel PCR results on this specimen are available    approximately 3 hours after the Gram stain result.***    Gram stain, PCR, and/or culture results may not always    correspond due to difference in methodologies.    ************************************************************    Sensitivities:      -  Amikacin: S <=16      -  Ampicillin: R >16 These ampicillin results predict results for amoxicillin      -  Ampicillin/Sulbactam: I 16/8 Enterobacter, Citrobacter, and Serratia may develop resistance during prolonged therapy (3-4 days)      -  Aztreonam: S <=4      -  Cefazolin: R >16 Enterobacter, Citrobacter, and Serratia may develop resistance during prolonged therapy (3-4 days)      -  Cefepime: S <=2      -  Cefoxitin: S <=8      -  Ceftriaxone: S <=1 Enterobacter, Citrobacter, and Serratia may develop resistance during prolonged therapy      -  Ciprofloxacin: S <=0.25      -  Ertapenem: S <=0.5      -  Gentamicin: S <=2      -  Imipenem: S <=1      -  Levofloxacin: S <=0.5      -  Meropenem: S <=1      -  Piperacillin/Tazobactam: S <=8      -  Tobramycin: S <=2      -  Trimethoprim/Sulfamethoxazole: R >2/38      Method Type: KAMILA  Organism: Blood Culture PCR    Sensitivities:      -  Klebsiella oxytoca: Detec      Method Type: PCR    Culture - Urine (collected 13 Aug 2020 00:40)  Source: .Urine Clean Catch (Midstream)  Final Report:    >=3 organisms. Probable collection contamination.    Culture - Blood (collected 17 Jun 2020 16:59)  Source: .Blood Blood-Peripheral  Final Report:    No Growth Final    Culture - Blood (collected 17 Jun 2020 14:19)  Source: .Blood Blood-Peripheral  Final Report:    No Growth Final    Babesia microti PCR, Blood. (collected 27 May 2020 10:16)  Source: .Blood  Final Report:    Babesia microti PCR    Results: NOT detected    Culture - Nocardia (collected 07 Sep 2018 13:51)  Source: .Broncial  Final Report:    Moderate Nocardia farcinica    Identification performed by    [place] [facility]    [address] [place] [address]    susceptibility performed by [facility], [facility],    [address], [place], [place] [address]    see separate report.      ABS CD4: 120 /uL (08-17-18 @ 13:00)    HIV-1 RNA Quantitative, Viral Load:   NOT DET. (05-31-18 @ 19:10)  HIV-1 Viral Load Result: NOT DET. (05-31-18 @ 19:10)  HIV-1 RNA Quantitative, Viral Load:   NOT DET. (02-02-18 @ 19:25)  HIV-1 Viral Load Result: NOT DET. (02-02-18 @ 19:25)      OTHER TESTS:  COVID-19 PCR: NotDetec (08-18-20 @ 13:14)  COVID-19 PCR: NotDetec (08-11-20 @ 20:08)    Aspergillus Galactomannan Antigen (08.14.20 @ 23:30)    Aspergillus Galactomannan Antigen: <0.500    Fungitell (08.15.20 @ 01:41)    Fungitell: <31    RADIOLOGY & ADDITIONAL STUDIES:  < from: Xray Chest 1 View- PORTABLE-Routine (08.18.20 @ 04:41) >  IMPRESSION:  Elevated right hemidiaphragm. Small right pleural effusion and right basilar atelectasis versus scarring.    < end of copied text >      < from: Xray Chest 1 View- PORTABLE-Routine (08.17.20 @ 02:42) >  FINDINGS/  impression:  The size of the cardiomediastinal silhouette cannot be accurately assessed in this single projection.  There are no focal pulmonary consolidations.    Elevated right hemidiaphragm. Small right pleural effusion and right basilar atelectasis. Hazy opacities throughout the left lung.    < end of copied text > INFECTIOUS DISEASES FOLLOW UP    This is a follow up note for this  70y Male with  Leukopenia  K.oxytoca bacteremia  Post OHT in 02/2018  On prednisone    Interval event:  - Stomach distension and pain  - Persistent tachy  - On ceftriaxone solo    ANTIMICROBIALS:    atovaquone Suspension 1500 daily  cefTRIAXone   IVPB 2000 every 24 hours  posaconazole DR Tablet 300 daily    OTHER MEDS:  MEDICATIONS  (STANDING):  acetaminophen   Tablet .. 650 every 6 hours PRN  enoxaparin Injectable 40 daily  everolimus (ZORTRESS) 0.75 <User Schedule>  insulin glargine Injectable (LANTUS) 14 at bedtime  insulin lispro (HumaLOG) corrective regimen sliding scale  three times a day before meals  insulin lispro Injectable (HumaLOG) 5 three times a day with meals  NIFEdipine  <User Schedule>  pantoprazole  Injectable 40 every 12 hours  pravastatin 20 at bedtime  predniSONE   Tablet 30 <User Schedule>    ROS:  CONSTITUTIONAL:  No fever, good appetite  CARDIOVASCULAR:  No chest pain or palpitations  RESPIRATORY:  No dyspnea  GASTROINTESTINAL:  No nausea, vomiting, diarrhea, or abdominal pain  GENITOURINARY:  No dysuria  NEUROLOGIC:  No headache,     Allergies  No Known Allergies    Vital Signs Last 24 Hrs  T(F): 97.8 (08-21-20 @ 05:15), Max: 98.9 (08-18-20 @ 12:00)    Vital Signs Last 24 Hrs  HR: 118 (08-21-20 @ 05:15) (118 - 123)  BP: 127/89 (08-21-20 @ 05:15) (125/85 - 130/83)  RR: 18 (08-21-20 @ 05:15)  SpO2: 97% (08-21-20 @ 05:15) (96% - 98%)  Wt(kg): --    PHYSICAL EXAM:  Constitutional:no acute distress  Eyes:WALT, EOMI  Ear/Nose/Throat: no oral lesions, 	  Respiratory: clear BL sternotomy healed  Cardiovascular: S1S2  Gastrointestinal:soft, (+) BS, no tenderness  Extremities:no e/e/c  No Lymphadenopathy  IV sites not inflammed.      Labs:                        8.1    3.97  )-----------( 236      ( 21 Aug 2020 08:17 )             25.7     08-21    141  |  106  |  26<H>  ----------------------------<  100<H>  3.8   |  26  |  1.18    Ca    8.8      21 Aug 2020 08:17  Phos  2.0     08-21  Mg     2.3     08-21        WBC Trend:  WBC Count: 3.97 (08-21-20 @ 08:17)  WBC Count: 2.66 (08-20-20 @ 07:42)  WBC Count: 3.00 (08-19-20 @ 06:01)  WBC Count: 2.59 (08-18-20 @ 00:33)      Creatine Trend:  Creatinine, Serum: 1.18 (08-21)  Creatinine, Serum: 1.03 (08-20)  Creatinine, Serum: 1.11 (08-19)  Creatinine, Serum: 1.06 (08-18)      Liver Biochemical Testing Trend:      Trend LDH  06-17-20 @ 08:51  376<H>  06-17-20 @ 08:50  390<H>  06-02-20 @ 07:26  323<H>  06-01-20 @ 07:16  266<H>  05-31-20 @ 07:53  246<H>  05-30-20 @ 07:39  247<H>  05-29-20 @ 07:42  223  05-28-20 @ 07:45  216  05-27-20 @ 07:15  237  05-26-20 @ 08:29  256<H>  05-23-20 @ 11:15  253<H>  05-23-20 @ 07:47  258<H>          MICROBIOLOGY:    Tick-Borne Disease Antibodies Panel, Serum (08.18.20 @ 15:13)    Ehrlichia Chaffeensis (HME) Ab, IgG: <1:64    Anaplasma Phagocytophilum Ab, IgG: <1:64    Babesia Microti IgG Ab: <1:64    Lyme Disease Serology: Negative: No evidence of antibodies to B. burgdorferi detected.    Culture - Urine (collected 14 Aug 2020 13:32)  Source: .Urine Clean Catch (Midstream)  Final Report:    No growth    Culture - Blood (collected 14 Aug 2020 01:32)  Source: .Blood Blood  Final Report:    No Growth Final    Culture - Blood (collected 14 Aug 2020 01:32)  Source: .Blood Blood  Final Report:    No Growth Final    Culture - Blood (collected 13 Aug 2020 14:48)  Source: .Blood Blood-Peripheral  Final Report:    Growth in aerobic and anaerobic bottles: Klebsiella oxytoca/Raoutella    ornithinolytica    See previous culture [id number]    Culture - Blood (collected 13 Aug 2020 12:14)  Source: .Blood Blood-Peripheral  Final Report:    Growth in anaerobic bottle: Klebsiella oxytoca/Raoutella ornithinolytica    "Due to technical problems, Proteus sp. will Not be reported as part of    the BCID panel until further notice"    ***Blood Panel PCR results on this specimen are available    approximately 3 hours after the Gram stain result.***    Gram stain, PCR, and/or culture results may not always    correspond due to difference in methodologies.    ************************************************************    Sensitivities:      -  Amikacin: S <=16      -  Ampicillin: R >16 These ampicillin results predict results for amoxicillin      -  Ampicillin/Sulbactam: I 16/8 Enterobacter, Citrobacter, and Serratia may develop resistance during prolonged therapy (3-4 days)      -  Aztreonam: S <=4      -  Cefazolin: R >16 Enterobacter, Citrobacter, and Serratia may develop resistance during prolonged therapy (3-4 days)      -  Cefepime: S <=2      -  Cefoxitin: S <=8      -  Ceftriaxone: S <=1 Enterobacter, Citrobacter, and Serratia may develop resistance during prolonged therapy      -  Ciprofloxacin: S <=0.25      -  Ertapenem: S <=0.5      -  Gentamicin: S <=2      -  Imipenem: S <=1      -  Levofloxacin: S <=0.5      -  Meropenem: S <=1      -  Piperacillin/Tazobactam: S <=8      -  Tobramycin: S <=2      -  Trimethoprim/Sulfamethoxazole: R >2/38      Method Type: KAMILA  Organism: Blood Culture PCR    Sensitivities:      -  Klebsiella oxytoca: Detec      Method Type: PCR    Culture - Urine (collected 13 Aug 2020 00:40)  Source: .Urine Clean Catch (Midstream)  Final Report:    >=3 organisms. Probable collection contamination.    Culture - Blood (collected 17 Jun 2020 16:59)  Source: .Blood Blood-Peripheral  Final Report:    No Growth Final    Culture - Blood (collected 17 Jun 2020 14:19)  Source: .Blood Blood-Peripheral  Final Report:    No Growth Final    Babesia microti PCR, Blood. (collected 27 May 2020 10:16)  Source: .Blood  Final Report:    Babesia microti PCR    Results: NOT detected    Culture - Nocardia (collected 07 Sep 2018 13:51)  Source: .Broncial  Final Report:    Moderate Nocardia farcinica    Identification performed by    [place] [facility]    [address] [place] [address]    susceptibility performed by [facility], [facility],    [address], [place], [place] [address]    see separate report.      ABS CD4: 120 /uL (08-17-18 @ 13:00)    HIV-1 RNA Quantitative, Viral Load:   NOT DET. (05-31-18 @ 19:10)  HIV-1 Viral Load Result: NOT DET. (05-31-18 @ 19:10)  HIV-1 RNA Quantitative, Viral Load:   NOT DET. (02-02-18 @ 19:25)  HIV-1 Viral Load Result: NOT DET. (02-02-18 @ 19:25)      OTHER TESTS:  COVID-19 PCR: NotDetec (08-18-20 @ 13:14)  COVID-19 PCR: NotDetec (08-11-20 @ 20:08)    Aspergillus Galactomannan Antigen (08.14.20 @ 23:30)    Aspergillus Galactomannan Antigen: <0.500    Fungitell (08.15.20 @ 01:41)    Fungitell: <31    Cytomegalovirus By PCR (06.18.20 @ 10:12)    CMVPCR Log: NotDetec    Cytomegalovirus By PCR (08.17.20 @ 11:55)    Cytomegalovirus By PCR: NotDetec      RADIOLOGY & ADDITIONAL STUDIES:  < from: Xray Chest 1 View- PORTABLE-Routine (08.18.20 @ 04:41) >  IMPRESSION:  Elevated right hemidiaphragm. Small right pleural effusion and right basilar atelectasis versus scarring.    < end of copied text >      < from: Xray Chest 1 View- PORTABLE-Routine (08.17.20 @ 02:42) >  FINDINGS/  impression:  The size of the cardiomediastinal silhouette cannot be accurately assessed in this single projection.  There are no focal pulmonary consolidations.    Elevated right hemidiaphragm. Small right pleural effusion and right basilar atelectasis. Hazy opacities throughout the left lung.    < end of copied text >

## 2020-08-21 NOTE — PROGRESS NOTE ADULT - ASSESSMENT
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 He has received a total of 3 units PRBC this admission with slow downtrend of hemoglobin. Labs were not consistent with hemolysis and he reported dark stools for which EGD/enteroscopy was performed which did not reveal source of bleeding but preliminary VCE showing possible gastric ulcer, duodenal angioectasia/erosions. He developed acute respiratory distress and profound sinus tachycardia on 8/13 in setting of Klebsiella bacteremia of unclear origin (preceded EGD) for which CT performed which suggests possible pneumonia. He has clinically improved with antibiotics.    Review of pertinent studies  8/2020 labs: Direct Jacky +, 4.6% reticulocytes with low RI, normal bilirubin,  (stable). Haptoglobin normal.

## 2020-08-22 DIAGNOSIS — R19.7 DIARRHEA, UNSPECIFIED: ICD-10-CM

## 2020-08-22 LAB
ANION GAP SERPL CALC-SCNC: 15 MMOL/L — SIGNIFICANT CHANGE UP (ref 5–17)
BUN SERPL-MCNC: 34 MG/DL — HIGH (ref 7–23)
C DIFF GDH STL QL: SIGNIFICANT CHANGE UP
C DIFF GDH STL QL: SIGNIFICANT CHANGE UP
CALCIUM SERPL-MCNC: 8.6 MG/DL — SIGNIFICANT CHANGE UP (ref 8.4–10.5)
CHLORIDE SERPL-SCNC: 104 MMOL/L — SIGNIFICANT CHANGE UP (ref 96–108)
CO2 SERPL-SCNC: 23 MMOL/L — SIGNIFICANT CHANGE UP (ref 22–31)
CREAT SERPL-MCNC: 1.37 MG/DL — HIGH (ref 0.5–1.3)
EVEROLIMUS, WHOLE BLOOD RESULT: 10.5 NG/ML — HIGH (ref 3–?)
GLUCOSE BLDC GLUCOMTR-MCNC: 123 MG/DL — HIGH (ref 70–99)
GLUCOSE BLDC GLUCOMTR-MCNC: 133 MG/DL — HIGH (ref 70–99)
GLUCOSE BLDC GLUCOMTR-MCNC: 143 MG/DL — HIGH (ref 70–99)
GLUCOSE BLDC GLUCOMTR-MCNC: 93 MG/DL — SIGNIFICANT CHANGE UP (ref 70–99)
GLUCOSE SERPL-MCNC: 106 MG/DL — HIGH (ref 70–99)
HCT VFR BLD CALC: 23.3 % — LOW (ref 39–50)
HGB BLD-MCNC: 7.5 G/DL — LOW (ref 13–17)
MAGNESIUM SERPL-MCNC: 2.4 MG/DL — SIGNIFICANT CHANGE UP (ref 1.6–2.6)
MCHC RBC-ENTMCNC: 32.1 PG — SIGNIFICANT CHANGE UP (ref 27–34)
MCHC RBC-ENTMCNC: 32.2 GM/DL — SIGNIFICANT CHANGE UP (ref 32–36)
MCV RBC AUTO: 99.6 FL — SIGNIFICANT CHANGE UP (ref 80–100)
NRBC # BLD: 2 /100 WBCS — HIGH (ref 0–0)
PHOSPHATE SERPL-MCNC: 2.7 MG/DL — SIGNIFICANT CHANGE UP (ref 2.5–4.5)
PLATELET # BLD AUTO: 278 K/UL — SIGNIFICANT CHANGE UP (ref 150–400)
POTASSIUM SERPL-MCNC: 3.5 MMOL/L — SIGNIFICANT CHANGE UP (ref 3.5–5.3)
POTASSIUM SERPL-SCNC: 3.5 MMOL/L — SIGNIFICANT CHANGE UP (ref 3.5–5.3)
RBC # BLD: 2.34 M/UL — LOW (ref 4.2–5.8)
RBC # FLD: 18.9 % — HIGH (ref 10.3–14.5)
SODIUM SERPL-SCNC: 142 MMOL/L — SIGNIFICANT CHANGE UP (ref 135–145)
WBC # BLD: 4.7 K/UL — SIGNIFICANT CHANGE UP (ref 3.8–10.5)
WBC # FLD AUTO: 4.7 K/UL — SIGNIFICANT CHANGE UP (ref 3.8–10.5)

## 2020-08-22 PROCEDURE — 99232 SBSQ HOSP IP/OBS MODERATE 35: CPT

## 2020-08-22 PROCEDURE — 99233 SBSQ HOSP IP/OBS HIGH 50: CPT

## 2020-08-22 PROCEDURE — 74019 RADEX ABDOMEN 2 VIEWS: CPT | Mod: 26

## 2020-08-22 RX ORDER — POTASSIUM BICARBONATE 978 MG/1
25 TABLET, EFFERVESCENT ORAL
Refills: 0 | Status: COMPLETED | OUTPATIENT
Start: 2020-08-22 | End: 2020-08-22

## 2020-08-22 RX ADMIN — Medication 5 UNIT(S): at 07:53

## 2020-08-22 RX ADMIN — POTASSIUM BICARBONATE 25 MILLIEQUIVALENT(S): 978 TABLET, EFFERVESCENT ORAL at 21:49

## 2020-08-22 RX ADMIN — POTASSIUM BICARBONATE 25 MILLIEQUIVALENT(S): 978 TABLET, EFFERVESCENT ORAL at 19:59

## 2020-08-22 RX ADMIN — Medication 1 MILLIGRAM(S): at 11:14

## 2020-08-22 RX ADMIN — ENOXAPARIN SODIUM 40 MILLIGRAM(S): 100 INJECTION SUBCUTANEOUS at 11:15

## 2020-08-22 RX ADMIN — SODIUM CHLORIDE 3 MILLILITER(S): 9 INJECTION INTRAMUSCULAR; INTRAVENOUS; SUBCUTANEOUS at 13:16

## 2020-08-22 RX ADMIN — Medication 30 MILLIGRAM(S): at 07:58

## 2020-08-22 RX ADMIN — PANTOPRAZOLE SODIUM 40 MILLIGRAM(S): 20 TABLET, DELAYED RELEASE ORAL at 11:15

## 2020-08-22 RX ADMIN — Medication 650 MILLIGRAM(S): at 09:33

## 2020-08-22 RX ADMIN — INSULIN GLARGINE 14 UNIT(S): 100 INJECTION, SOLUTION SUBCUTANEOUS at 21:49

## 2020-08-22 RX ADMIN — Medication 5 UNIT(S): at 11:54

## 2020-08-22 RX ADMIN — SODIUM CHLORIDE 3 MILLILITER(S): 9 INJECTION INTRAMUSCULAR; INTRAVENOUS; SUBCUTANEOUS at 05:16

## 2020-08-22 RX ADMIN — Medication 2 GRAM(S): at 17:07

## 2020-08-22 RX ADMIN — POSACONAZOLE 300 MILLIGRAM(S): 100 TABLET, DELAYED RELEASE ORAL at 11:14

## 2020-08-22 RX ADMIN — POTASSIUM BICARBONATE 25 MILLIEQUIVALENT(S): 978 TABLET, EFFERVESCENT ORAL at 18:48

## 2020-08-22 RX ADMIN — Medication 120 MILLIGRAM(S): at 07:56

## 2020-08-22 RX ADMIN — EVEROLIMUS 0.75 MILLIGRAM(S): 10 TABLET ORAL at 07:55

## 2020-08-22 RX ADMIN — ATOVAQUONE 1500 MILLIGRAM(S): 750 SUSPENSION ORAL at 11:15

## 2020-08-22 RX ADMIN — MAGNESIUM OXIDE 400 MG ORAL TABLET 400 MILLIGRAM(S): 241.3 TABLET ORAL at 11:14

## 2020-08-22 RX ADMIN — Medication 2 GRAM(S): at 05:18

## 2020-08-22 RX ADMIN — EVEROLIMUS 0.75 MILLIGRAM(S): 10 TABLET ORAL at 19:59

## 2020-08-22 RX ADMIN — Medication 650 MILLIGRAM(S): at 09:05

## 2020-08-22 RX ADMIN — SODIUM CHLORIDE 3 MILLILITER(S): 9 INJECTION INTRAMUSCULAR; INTRAVENOUS; SUBCUTANEOUS at 21:44

## 2020-08-22 RX ADMIN — CEFTRIAXONE 100 MILLIGRAM(S): 500 INJECTION, POWDER, FOR SOLUTION INTRAMUSCULAR; INTRAVENOUS at 13:10

## 2020-08-22 RX ADMIN — Medication 20 MILLIGRAM(S): at 21:49

## 2020-08-22 RX ADMIN — Medication 1000 UNIT(S): at 11:14

## 2020-08-22 NOTE — PROGRESS NOTE ADULT - ASSESSMENT
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 He has received a total of 3 units PRBC this admission with slow downtrend of hemoglobin. Labs were not consistent with hemolysis and he reported dark stools for which EGD/enteroscopy was performed which did not reveal source of bleeding but preliminary VCE showing possible gastric ulcer, duodenal angioectasia/erosions. He developed acute respiratory distress and profound sinus tachycardia on 8/13 in setting of Klebsiella bacteremia of unclear origin (preceded EGD) for which CT performed which suggests possible pneumonia. He has clinically improved with antibiotics, however, still has diarrhea and a distended abd.     Review of pertinent studies  8/2020 labs: Direct Jacky +, 4.6% reticulocytes with low RI, normal bilirubin,  (stable). Haptoglobin normal.

## 2020-08-22 NOTE — PROGRESS NOTE ADULT - ASSESSMENT
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 Patient was treated w/ prednisone and Rituximab. Tacro was discontinued and patient was also transitioned to everolimus. Admitted again 6/16-6/19 for hyperglycemia. On 8/11 Reported to transplant team having one done black tarry stool-  instructed to present to [facility] for hemolytic anemia. Patient has been following with Hematology outpatient.  Denies CP  N/V diarrhea SOB. (11 Aug 2020 20:08)  Surgery/Hospital Course:  8/11 Admitted to [facility] with symptomatic anemia  8/13 EGD for investigation of tarry stools  8/14 CTU for SVT and respiratory distress   8/15 SB capsule: stomach & SB lesions, likely ulcers.  8/17 EGD/push enteroscopy w/ angioectasias/erosions in small bowel. Gastropathy and esophagitis. Ulcer seen on VCE most likely scope trauma.    8/18 VSS transferred back to floor.   8/19 VSS, H/H stable 8.8/24.5, would continue to hold asa per transplant team. Follow up CMV PCR and gastric biopsy.   8/20 VS 9.0/28.4 stable Gastric biopsy results LA Grade A esophagitis.  - Acute gastritis. Biopsies taken to r/o CMV, No ulceration seen despite finding on capsule endoscopy - likely 2/2 scope  trauma that has since resolved. Pathology pending.  8/21 VSS H/H  8.1/25.7  trending down will monitor prednisone taper 30 mg today. Procardia 120 initiated today [facility] biopsy Monday.   8/22 VVS; Patient reports loose stools x 2-3 days.  1 episode today.  Stool sent for C-dif and GI PCR. Abdominal X-ray revealed: GI called to re-consult 70y Male w/ NICM s/p HM2 s/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 Patient was treated w/ prednisone and Rituximab. Tacro was discontinued and patient was also transitioned to everolimus. Admitted again 6/16-6/19 for hyperglycemia. On 8/11 Reported to transplant team having one done black tarry stool-  instructed to present to [facility] for hemolytic anemia. Patient has been following with Hematology outpatient.  Denies CP  N/V diarrhea SOB. (11 Aug 2020 20:08)  Surgery/Hospital Course:  8/11 Admitted to [facility] with symptomatic anemia  8/13 EGD for investigation of tarry stools  8/14 CTU for SVT and respiratory distress   8/15 SB capsule: stomach & SB lesions, likely ulcers.  8/17 EGD/push enteroscopy w/ angioectasias/erosions in small bowel. Gastropathy and esophagitis. Ulcer seen on VCE most likely scope trauma.    8/18 VSS transferred back to floor.   8/19 VSS, H/H stable 8.8/24.5, would continue to hold asa per transplant team. Follow up CMV PCR and gastric biopsy.   8/20 VS 9.0/28.4 stable Gastric biopsy results LA Grade A esophagitis.  - Acute gastritis. Biopsies taken to r/o CMV, No ulceration seen despite finding on capsule endoscopy - likely 2/2 scope  trauma that has since resolved. Pathology pending.  8/21 VSS H/H  8.1/25.7  trending down will monitor prednisone taper 30 mg today. Procardia 120 initiated today [facility] biopsy Monday.   8/22 VVS; Patient reports loose stools x 2-3 days with 1 episode today.  Stool sent for C-dif and GI PCR. Abdominal X-ray revealed: dilated loops of bowel. Abdomen distended.  Patient denies N/V. GI called to re-evaluate. Continue with current medication regimen.  Awaiting for upcoming cardiac biopsy on Monday 8/24.

## 2020-08-22 NOTE — PROGRESS NOTE ADULT - PROBLEM SELECTOR PLAN 5
Abdominal X-ray revealed dilated loops of bowel   GI called for further evaluation  Stool sent for C-dif and GI PCR.

## 2020-08-22 NOTE — CHART NOTE - NSCHARTNOTEFT_GEN_A_CORE
Pt seen and examined. GI reconsulted for dilated loops of bowel and abdominal distention.  Clinically, patient is asymptomatic, continues to be having BMs and passing gas. Primary team reports that patient is having frequent loose BMs.   Pt denies nausea, vomiting, abdominal pain.  At this time, would recommend serial abdominal exam, checking GI PCR and Cdiff.  If patient becomes symptomatic, would insert NGT (to intermittent suction) for decompression.

## 2020-08-22 NOTE — PROGRESS NOTE ADULT - ATTENDING COMMENTS
meds: mepron, everolimus .75 bid, posi, nifedipine 120, pred 30, prava, folic, multivit. ceftiaxome.   120, 118/-100/ , afbrile  08-22    142  |  104  |  34<H>  ----------------------------<  106<H>  3.5   |  23  |  1.37<H>    Ca    8.6      22 Aug 2020 08:54  Phos  2.7     08-22  Mg     2.4     08-22                        7.5    4.70  )-----------( 278      ( 22 Aug 2020 08:54 )             23.3   CMV PCR pending.   AXR show dilated loops of bowel. feels and looks well, however diahrrea for 2 days. abdo still distended.   meds: mepron, everolimus .75 bid, posi, nifedipine 120, pred 30, prava, folic, multivit. ceftiaxome.   120, 118/-100/ , afbrile  08-22    142  |  104  |  34<H>  ----------------------------<  106<H>  3.5   |  23  |  1.37<H>    Ca    8.6      22 Aug 2020 08:54  Phos  2.7     08-22  Mg     2.4     08-22                        7.5    4.70  )-----------( 278      ( 22 Aug 2020 08:54 )             23.3   CMV PCR pending.   AXR show dilated loops of bowel.  Please send stool for cdiff.  Please ask GI to reexamine today.   Marvin Campbell

## 2020-08-22 NOTE — PROGRESS NOTE ADULT - PROBLEM SELECTOR PLAN 1
-C/w statin for TCAD ppx  -ASA on hold given GIB  -C/w everolimus 0.75mg BID, will follow levels periodically while hospitalized (true trough in lab). Goal 8-10.  -Steroid taper as written   -C/w posiconazole for ppx; Bactrim and Valcyte on hold given leukopenia  -Off Cellcept while on high dose steroids. Will eventually need to start Cellcept as steroids weaned  -Repeat TTE reviewed; unremarkable  - Biopsy Monday

## 2020-08-22 NOTE — PROGRESS NOTE ADULT - SUBJECTIVE AND OBJECTIVE BOX
Patient seen and examined at bedside.    Overnight Events:       REVIEW OF SYSTEMS:  Constitutional:     [ ] negative [ ] fevers [ ] chills [ ] weight loss [ ] weight gain  HEENT:                  [ ] negative [ ] dry eyes [ ] eye irritation [ ] postnasal drip [ ] nasal congestion  CV:                         [ ] negative  [ ] chest pain [ ] orthopnea [ ] palpitations [ ] murmur  Resp:                     [ ] negative [ ] cough [ ] shortness of breath [ ] dyspnea [ ] wheezing [ ] sputum [ ]hemoptysis  GI:                          [ ] negative [ ] nausea [ ] vomiting [ ] diarrhea [ ] constipation [ ] abd pain [ ] dysphagia   :                        [ ] negative [ ] dysuria [ ] nocturia [ ] hematuria [ ] increased urinary frequency  Musculoskeletal: [ ] negative [ ] back pain [ ] myalgias [ ] arthralgias [ ] fracture  Skin:                       [ ] negative [ ] rash [ ] itch  Neurological:        [ ] negative [ ] headache [ ] dizziness [ ] syncope [ ] weakness [ ] numbness  Psychiatric:           [ ] negative [ ] anxiety [ ] depression  Endocrine:            [ ] negative [ ] diabetes [ ] thyroid problem  Heme/Lymph:      [ ] negative [ ] anemia [ ] bleeding problem  Allergic/Immune: [ ] negative [ ] itchy eyes [ ] nasal discharge [ ] hives [ ] angioedema    [ ] All other systems negative  [ ] Unable to assess ROS due to    Current Meds:  acetaminophen   Tablet .. 650 milliGRAM(s) Oral every 6 hours PRN  atovaquone Suspension 1500 milliGRAM(s) Oral daily  cefTRIAXone   IVPB 2000 milliGRAM(s) IV Intermittent every 24 hours  cholecalciferol 1000 Unit(s) Oral daily  dextrose 5%. 1000 milliLiter(s) IV Continuous <Continuous>  enoxaparin Injectable 40 milliGRAM(s) SubCutaneous daily  everolimus (ZORTRESS) 0.75 milliGRAM(s) Oral <User Schedule>  folic acid 1 milliGRAM(s) Oral daily  insulin glargine Injectable (LANTUS) 14 Unit(s) SubCutaneous at bedtime  insulin lispro (HumaLOG) corrective regimen sliding scale   SubCutaneous three times a day before meals  insulin lispro Injectable (HumaLOG) 5 Unit(s) SubCutaneous three times a day with meals  magnesium oxide 400 milliGRAM(s) Oral daily  NIFEdipine  milliGRAM(s) Oral <User Schedule>  omega-3-Acid Ethyl Esters 2 Gram(s) Oral two times a day  pantoprazole  Injectable 40 milliGRAM(s) IV Push daily  posaconazole DR Tablet 300 milliGRAM(s) Oral daily  pravastatin 20 milliGRAM(s) Oral at bedtime  predniSONE   Tablet 30 milliGRAM(s) Oral <User Schedule>  sodium chloride 0.9% lock flush 3 milliLiter(s) IV Push every 8 hours      PAST MEDICAL & SURGICAL HISTORY:  H/O hemolytic anemia  H/O autoimmune hemolytic anemia  Knee pain, right  HLD (hyperlipidemia)  Former smoker  DVT of upper extremity (deep vein thrombosis)  Hepatitis C virus  GIB (gastrointestinal bleeding)  Ventricular fibrillation: s/p AICD  PAF (paroxysmal atrial fibrillation): on xarelto  Non-Ischemic Cardiomyopathy  SVT (Supraventricular Tachycardia)  HTN  CHF (Congestive Heart Failure)  S/P right heart catheterization: biopsy multiple  H/O heart transplant: 2/2018  Status post left hip replacement  History of Prior Ablation Treatment: for afib  AICD (Automatic Cardioverter/Defibrillator) Present: [employer] with 1 [employer] lead4/1/09- explanted and replaced with Telerivettronic 2 leads on 9/2/09      Vitals:  T(F): 98 (08-22), Max: 98 (08-22)  HR: 90 (08-22) (79 - 127)  BP: 102/70 (08-22) (102/70 - 118/79)  RR: 18 (08-22)  SpO2: 99% (08-22)  I&O's Summary    21 Aug 2020 07:01  -  22 Aug 2020 07:00  --------------------------------------------------------  IN: 1000 mL / OUT: 980 mL / NET: 20 mL    22 Aug 2020 07:01  -  22 Aug 2020 15:09  --------------------------------------------------------  IN: 240 mL / OUT: 0 mL / NET: 240 mL        Physical Exam:  Appearance: No acute distress; well appearing  Eyes: PERRL, EOMI, pink conjunctiva  HENT: Normal oral mucosa  Cardiovascular: RRR, S1, S2, no murmurs, rubs, or gallops; no edema; no JVD  Respiratory: Clear to auscultation bilaterally  Gastrointestinal: soft, non-tender, non-distended with normal bowel sounds  Musculoskeletal: No clubbing; no joint deformity   Neurologic: Non-focal  Lymphatic: No lymphadenopathy  Psychiatry: AAOx3, mood & affect appropriate  Skin: No rashes, ecchymoses, or cyanosis                          7.5    4.70  )-----------( 278      ( 22 Aug 2020 08:54 )             23.3     08-22    142  |  104  |  34<H>  ----------------------------<  106<H>  3.5   |  23  |  1.37<H>    Ca    8.6      22 Aug 2020 08:54  Phos  2.7     08-22  Mg     2.4     08-22 Patient seen and examined at bedside.    Overnight Events: Pt states that he feels well, but is still having some abd distension.       REVIEW OF SYSTEMS:  Constitutional:     [ ] negative [ ] fevers [ ] chills [ ] weight loss [ ] weight gain  HEENT:                  [ ] negative [ ] dry eyes [ ] eye irritation [ ] postnasal drip [ ] nasal congestion  CV:                         [ ] negative  [ ] chest pain [ ] orthopnea [ ] palpitations [ ] murmur  Resp:                     [ ] negative [ ] cough [ ] shortness of breath [ ] dyspnea [ ] wheezing [ ] sputum [ ]hemoptysis  GI:                          [ ] negative [ ] nausea [ ] vomiting [ ] diarrhea [ ] constipation [ ] abd pain [ ] dysphagia   :                        [ ] negative [ ] dysuria [ ] nocturia [ ] hematuria [ ] increased urinary frequency  Musculoskeletal: [ ] negative [ ] back pain [ ] myalgias [ ] arthralgias [ ] fracture  Skin:                       [ ] negative [ ] rash [ ] itch  Neurological:        [ ] negative [ ] headache [ ] dizziness [ ] syncope [ ] weakness [ ] numbness  Psychiatric:           [ ] negative [ ] anxiety [ ] depression  Endocrine:            [ ] negative [ ] diabetes [ ] thyroid problem  Heme/Lymph:      [ ] negative [ ] anemia [ ] bleeding problem  Allergic/Immune: [ ] negative [ ] itchy eyes [ ] nasal discharge [ ] hives [ ] angioedema    [x ] All other systems negative  [ ] Unable to assess ROS due to    Current Meds:  acetaminophen   Tablet .. 650 milliGRAM(s) Oral every 6 hours PRN  atovaquone Suspension 1500 milliGRAM(s) Oral daily  cefTRIAXone   IVPB 2000 milliGRAM(s) IV Intermittent every 24 hours  cholecalciferol 1000 Unit(s) Oral daily  dextrose 5%. 1000 milliLiter(s) IV Continuous <Continuous>  enoxaparin Injectable 40 milliGRAM(s) SubCutaneous daily  everolimus (ZORTRESS) 0.75 milliGRAM(s) Oral <User Schedule>  folic acid 1 milliGRAM(s) Oral daily  insulin glargine Injectable (LANTUS) 14 Unit(s) SubCutaneous at bedtime  insulin lispro (HumaLOG) corrective regimen sliding scale   SubCutaneous three times a day before meals  insulin lispro Injectable (HumaLOG) 5 Unit(s) SubCutaneous three times a day with meals  magnesium oxide 400 milliGRAM(s) Oral daily  NIFEdipine  milliGRAM(s) Oral <User Schedule>  omega-3-Acid Ethyl Esters 2 Gram(s) Oral two times a day  pantoprazole  Injectable 40 milliGRAM(s) IV Push daily  posaconazole DR Tablet 300 milliGRAM(s) Oral daily  pravastatin 20 milliGRAM(s) Oral at bedtime  predniSONE   Tablet 30 milliGRAM(s) Oral <User Schedule>  sodium chloride 0.9% lock flush 3 milliLiter(s) IV Push every 8 hours      PAST MEDICAL & SURGICAL HISTORY:  H/O hemolytic anemia  H/O autoimmune hemolytic anemia  Knee pain, right  HLD (hyperlipidemia)  Former smoker  DVT of upper extremity (deep vein thrombosis)  Hepatitis C virus  GIB (gastrointestinal bleeding)  Ventricular fibrillation: s/p AICD  PAF (paroxysmal atrial fibrillation): on xarelto  Non-Ischemic Cardiomyopathy  SVT (Supraventricular Tachycardia)  HTN  CHF (Congestive Heart Failure)  S/P right heart catheterization: biopsy multiple  H/O heart transplant: 2/2018  Status post left hip replacement  History of Prior Ablation Treatment: for afib  AICD (Automatic Cardioverter/Defibrillator) Present: [employer] with 1 [employer] lead4/1/09- explanted and replaced with PollVaultrtronic 2 leads on 9/2/09      Vitals:  T(F): 98 (08-22), Max: 98 (08-22)  HR: 90 (08-22) (79 - 127)  BP: 102/70 (08-22) (102/70 - 118/79)  RR: 18 (08-22)  SpO2: 99% (08-22)  I&O's Summary    21 Aug 2020 07:01  -  22 Aug 2020 07:00  --------------------------------------------------------  IN: 1000 mL / OUT: 980 mL / NET: 20 mL    22 Aug 2020 07:01  -  22 Aug 2020 15:09  --------------------------------------------------------  IN: 240 mL / OUT: 0 mL / NET: 240 mL        Physical Exam:  Appearance: No acute distress; well appearing  Eyes: PERRL, EOMI, pink conjunctiva  HENT: Normal oral mucosa  Cardiovascular: RRR, S1, S2, no murmurs, rubs, or gallops; no edema; no JVD  Respiratory: Clear to auscultation bilaterally  Gastrointestinal: soft, non-tender, distended with normal bowel sounds  Musculoskeletal: No clubbing; no joint deformity   Neurologic: Non-focal  Lymphatic: No lymphadenopathy  Psychiatry: AAOx3, mood & affect appropriate  Skin: No rashes, ecchymoses, or cyanosis                          7.5    4.70  )-----------( 278      ( 22 Aug 2020 08:54 )             23.3     08-22    142  |  104  |  34<H>  ----------------------------<  106<H>  3.5   |  23  |  1.37<H>    Ca    8.6      22 Aug 2020 08:54  Phos  2.7     08-22  Mg     2.4     08-22

## 2020-08-22 NOTE — PROGRESS NOTE ADULT - SUBJECTIVE AND OBJECTIVE BOX
VITAL SIGNS    Subjective: "I'm feeling ok." Denies CP, palpitation, SOB, LOBATO, HA, dizziness, N/V/D, fever or chills.  No acute event noted overnight.     Telemetry:  's     Vital Signs Last 24 Hrs  T(C): 36.7 (08-22-20 @ 05:00), Max: 36.7 (08-22-20 @ 05:00)  T(F): 98 (08-22-20 @ 05:00), Max: 98 (08-22-20 @ 05:00)  HR: 90 (08-22-20 @ 14:00) (79 - 127)  BP: 102/70 (08-22-20 @ 14:00) (102/70 - 118/79)  RR: 18 (08-22-20 @ 14:00) (18 - 18)  SpO2: 99% (08-22-20 @ 14:00) (92% - 99%)           08-21 @ 07:01  -  08-22 @ 07:00  --------------------------------------------------------  IN: 1000 mL / OUT: 980 mL / NET: 20 mL    08-22 @ 07:01  -  08-22 @ 17:47  --------------------------------------------------------  IN: 240 mL / OUT: 0 mL / NET: 240 mL    CAPILLARY BLOOD GLUCOSE    POCT Blood Glucose.: 123 mg/dL (22 Aug 2020 17:06)  POCT Blood Glucose.: 143 mg/dL (22 Aug 2020 11:51)  POCT Blood Glucose.: 93 mg/dL (22 Aug 2020 07:51)  POCT Blood Glucose.: 98 mg/dL (21 Aug 2020 21:22)        PHYSICAL EXAM    Neurology: alert and oriented x 3, nonfocal, no gross deficits    CV: (+) S1 and S2, No murmurs, rubs, gallops or clicks     Lungs: CTA B/L     Abdomen: soft, nontender, (+) distended, positive bowel sounds, (+) Flatus; (+) BM loose x 1    :  Voiding               Extremities:  B/L LE (+) edema; negative calf tenderness; (+) 2 DP palpable        acetaminophen  Tablet .. 650 milliGRAM(s) Oral every 6 hours PRN  atovaquone Suspension 1500 milliGRAM(s) Oral daily  cefTRIAXone  IVPB 2000 milliGRAM(s) IV Intermittent every 24 hours  cholecalciferol 1000 Unit(s) Oral daily  enoxaparin Injectable 40 milliGRAM(s) SubCutaneous daily  everolimus (ZORTRESS) 0.75 milliGRAM(s) Oral <User Schedule>  folic acid 1 milliGRAM(s) Oral daily  insulin glargine Injectable (LANTUS) 14 Unit(s) SubCutaneous at bedtime  insulin lispro (HumaLOG) corrective regimen sliding scale   SubCutaneous three times a day before meals  insulin lispro Injectable (HumaLOG) 5 Unit(s) SubCutaneous three times a day with meals  magnesium oxide 400 milliGRAM(s) Oral daily  NIFEdipine  milliGRAM(s) Oral <User Schedule>  omega-3-Acid Ethyl Esters 2 Gram(s) Oral two times a day  pantoprazole  Injectable 40 milliGRAM(s) IV Push daily  posaconazole DR Tablet 300 milliGRAM(s) Oral daily  pravastatin 20 milliGRAM(s) Oral at bedtime  predniSONE  Tablet 30 milliGRAM(s) Oral <User Schedule>  sodium chloride 0.9% lock flush 3 milliLiter(s) IV Push every 8 hours    Physical Therapy Rec:   Home  [  ]   Home w/ PT  [ X ]  Rehab  [  ]    Discussed with Cardiothoracic Team at AM rounds. VITAL SIGNS    Subjective: "I'm feeling ok, I did have some loose stool" Denies CP, palpitation, SOB, LOBATO, HA, dizziness, N/V, fever or chills.  No acute event noted overnight.     Telemetry:  's     Vital Signs Last 24 Hrs  T(C): 36.7 (08-22-20 @ 05:00), Max: 36.7 (08-22-20 @ 05:00)  T(F): 98 (08-22-20 @ 05:00), Max: 98 (08-22-20 @ 05:00)  HR: 90 (08-22-20 @ 14:00) (79 - 127)  BP: 102/70 (08-22-20 @ 14:00) (102/70 - 118/79)  RR: 18 (08-22-20 @ 14:00) (18 - 18)  SpO2: 99% (08-22-20 @ 14:00) (92% - 99%)           08-21 @ 07:01  -  08-22 @ 07:00  --------------------------------------------------------  IN: 1000 mL / OUT: 980 mL / NET: 20 mL    08-22 @ 07:01  -  08-22 @ 17:47  --------------------------------------------------------  IN: 240 mL / OUT: 0 mL / NET: 240 mL    CAPILLARY BLOOD GLUCOSE    POCT Blood Glucose.: 123 mg/dL (22 Aug 2020 17:06)  POCT Blood Glucose.: 143 mg/dL (22 Aug 2020 11:51)  POCT Blood Glucose.: 93 mg/dL (22 Aug 2020 07:51)  POCT Blood Glucose.: 98 mg/dL (21 Aug 2020 21:22)        PHYSICAL EXAM    Neurology: alert and oriented x 3, nonfocal, no gross deficits    CV: (+) S1 and S2, No murmurs, rubs, gallops or clicks     Lungs: CTA B/L     Abdomen: soft, nontender, (+) distended, positive bowel sounds, (+) Flatus; (+) BM loose x 1    :  Voiding               Extremities:  B/L LE (+) edema; negative calf tenderness; (+) 2 DP palpable        acetaminophen  Tablet .. 650 milliGRAM(s) Oral every 6 hours PRN  atovaquone Suspension 1500 milliGRAM(s) Oral daily  cefTRIAXone  IVPB 2000 milliGRAM(s) IV Intermittent every 24 hours  cholecalciferol 1000 Unit(s) Oral daily  enoxaparin Injectable 40 milliGRAM(s) SubCutaneous daily  everolimus (ZORTRESS) 0.75 milliGRAM(s) Oral <User Schedule>  folic acid 1 milliGRAM(s) Oral daily  insulin glargine Injectable (LANTUS) 14 Unit(s) SubCutaneous at bedtime  insulin lispro (HumaLOG) corrective regimen sliding scale   SubCutaneous three times a day before meals  insulin lispro Injectable (HumaLOG) 5 Unit(s) SubCutaneous three times a day with meals  magnesium oxide 400 milliGRAM(s) Oral daily  NIFEdipine  milliGRAM(s) Oral <User Schedule>  omega-3-Acid Ethyl Esters 2 Gram(s) Oral two times a day  pantoprazole  Injectable 40 milliGRAM(s) IV Push daily  posaconazole DR Tablet 300 milliGRAM(s) Oral daily  pravastatin 20 milliGRAM(s) Oral at bedtime  predniSONE  Tablet 30 milliGRAM(s) Oral <User Schedule>  sodium chloride 0.9% lock flush 3 milliLiter(s) IV Push every 8 hours    Physical Therapy Rec:   Home  [  ]   Home w/ PT  [ X ]  Rehab  [  ]    Discussed with Cardiothoracic Team at AM rounds.

## 2020-08-23 DIAGNOSIS — A04.72 ENTEROCOLITIS DUE TO CLOSTRIDIUM DIFFICILE, NOT SPECIFIED AS RECURRENT: ICD-10-CM

## 2020-08-23 DIAGNOSIS — R74.8 ABNORMAL LEVELS OF OTHER SERUM ENZYMES: ICD-10-CM

## 2020-08-23 LAB
ALBUMIN SERPL ELPH-MCNC: 3.6 G/DL — SIGNIFICANT CHANGE UP (ref 3.3–5)
ALP SERPL-CCNC: 59 U/L — SIGNIFICANT CHANGE UP (ref 40–120)
ALT FLD-CCNC: 37 U/L — SIGNIFICANT CHANGE UP (ref 10–45)
ANION GAP SERPL CALC-SCNC: 12 MMOL/L — SIGNIFICANT CHANGE UP (ref 5–17)
ANION GAP SERPL CALC-SCNC: 16 MMOL/L — SIGNIFICANT CHANGE UP (ref 5–17)
AST SERPL-CCNC: 43 U/L — HIGH (ref 10–40)
BILIRUB SERPL-MCNC: 0.6 MG/DL — SIGNIFICANT CHANGE UP (ref 0.2–1.2)
BUN SERPL-MCNC: 48 MG/DL — HIGH (ref 7–23)
BUN SERPL-MCNC: 49 MG/DL — HIGH (ref 7–23)
C DIFF BY PCR RESULT: DETECTED
C DIFF TOX GENS STL QL NAA+PROBE: SIGNIFICANT CHANGE UP
CALCIUM SERPL-MCNC: 8.5 MG/DL — SIGNIFICANT CHANGE UP (ref 8.4–10.5)
CALCIUM SERPL-MCNC: 9 MG/DL — SIGNIFICANT CHANGE UP (ref 8.4–10.5)
CHLORIDE SERPL-SCNC: 101 MMOL/L — SIGNIFICANT CHANGE UP (ref 96–108)
CHLORIDE SERPL-SCNC: 101 MMOL/L — SIGNIFICANT CHANGE UP (ref 96–108)
CO2 SERPL-SCNC: 24 MMOL/L — SIGNIFICANT CHANGE UP (ref 22–31)
CO2 SERPL-SCNC: 25 MMOL/L — SIGNIFICANT CHANGE UP (ref 22–31)
CREAT SERPL-MCNC: 2.28 MG/DL — HIGH (ref 0.5–1.3)
CREAT SERPL-MCNC: 2.31 MG/DL — HIGH (ref 0.5–1.3)
CULTURE RESULTS: SIGNIFICANT CHANGE UP
GLUCOSE BLDC GLUCOMTR-MCNC: 122 MG/DL — HIGH (ref 70–99)
GLUCOSE BLDC GLUCOMTR-MCNC: 145 MG/DL — HIGH (ref 70–99)
GLUCOSE BLDC GLUCOMTR-MCNC: 87 MG/DL — SIGNIFICANT CHANGE UP (ref 70–99)
GLUCOSE BLDC GLUCOMTR-MCNC: 99 MG/DL — SIGNIFICANT CHANGE UP (ref 70–99)
GLUCOSE SERPL-MCNC: 115 MG/DL — HIGH (ref 70–99)
GLUCOSE SERPL-MCNC: 95 MG/DL — SIGNIFICANT CHANGE UP (ref 70–99)
HCT VFR BLD CALC: 23.9 % — LOW (ref 39–50)
HGB BLD-MCNC: 7.5 G/DL — LOW (ref 13–17)
MCHC RBC-ENTMCNC: 31.3 PG — SIGNIFICANT CHANGE UP (ref 27–34)
MCHC RBC-ENTMCNC: 31.4 GM/DL — LOW (ref 32–36)
MCV RBC AUTO: 99.6 FL — SIGNIFICANT CHANGE UP (ref 80–100)
NRBC # BLD: 0 /100 WBCS — SIGNIFICANT CHANGE UP (ref 0–0)
PLATELET # BLD AUTO: 274 K/UL — SIGNIFICANT CHANGE UP (ref 150–400)
POTASSIUM SERPL-MCNC: 3.8 MMOL/L — SIGNIFICANT CHANGE UP (ref 3.5–5.3)
POTASSIUM SERPL-MCNC: 4.4 MMOL/L — SIGNIFICANT CHANGE UP (ref 3.5–5.3)
POTASSIUM SERPL-SCNC: 3.8 MMOL/L — SIGNIFICANT CHANGE UP (ref 3.5–5.3)
POTASSIUM SERPL-SCNC: 4.4 MMOL/L — SIGNIFICANT CHANGE UP (ref 3.5–5.3)
PROT SERPL-MCNC: 5.8 G/DL — LOW (ref 6–8.3)
RBC # BLD: 2.4 M/UL — LOW (ref 4.2–5.8)
RBC # FLD: 19.4 % — HIGH (ref 10.3–14.5)
SODIUM SERPL-SCNC: 138 MMOL/L — SIGNIFICANT CHANGE UP (ref 135–145)
SODIUM SERPL-SCNC: 141 MMOL/L — SIGNIFICANT CHANGE UP (ref 135–145)
SPECIMEN SOURCE: SIGNIFICANT CHANGE UP
WBC # BLD: 5.47 K/UL — SIGNIFICANT CHANGE UP (ref 3.8–10.5)
WBC # FLD AUTO: 5.47 K/UL — SIGNIFICANT CHANGE UP (ref 3.8–10.5)

## 2020-08-23 PROCEDURE — 99232 SBSQ HOSP IP/OBS MODERATE 35: CPT

## 2020-08-23 PROCEDURE — 76376 3D RENDER W/INTRP POSTPROCES: CPT | Mod: 26

## 2020-08-23 PROCEDURE — 93306 TTE W/DOPPLER COMPLETE: CPT | Mod: 26

## 2020-08-23 PROCEDURE — 99233 SBSQ HOSP IP/OBS HIGH 50: CPT

## 2020-08-23 PROCEDURE — 74176 CT ABD & PELVIS W/O CONTRAST: CPT | Mod: 26

## 2020-08-23 RX ORDER — VANCOMYCIN HCL 1 G
125 VIAL (EA) INTRAVENOUS EVERY 6 HOURS
Refills: 0 | Status: DISCONTINUED | OUTPATIENT
Start: 2020-08-23 | End: 2020-08-24

## 2020-08-23 RX ORDER — EVEROLIMUS 10 MG/1
0.5 TABLET ORAL
Refills: 0 | Status: DISCONTINUED | OUTPATIENT
Start: 2020-08-23 | End: 2020-08-27

## 2020-08-23 RX ORDER — SODIUM CHLORIDE 9 MG/ML
1000 INJECTION INTRAMUSCULAR; INTRAVENOUS; SUBCUTANEOUS
Refills: 0 | Status: DISCONTINUED | OUTPATIENT
Start: 2020-08-23 | End: 2020-08-25

## 2020-08-23 RX ADMIN — EVEROLIMUS 0.5 MILLIGRAM(S): 10 TABLET ORAL at 20:03

## 2020-08-23 RX ADMIN — Medication 125 MILLIGRAM(S): at 13:45

## 2020-08-23 RX ADMIN — SODIUM CHLORIDE 3 MILLILITER(S): 9 INJECTION INTRAMUSCULAR; INTRAVENOUS; SUBCUTANEOUS at 13:44

## 2020-08-23 RX ADMIN — MAGNESIUM OXIDE 400 MG ORAL TABLET 400 MILLIGRAM(S): 241.3 TABLET ORAL at 09:27

## 2020-08-23 RX ADMIN — SODIUM CHLORIDE 3 MILLILITER(S): 9 INJECTION INTRAMUSCULAR; INTRAVENOUS; SUBCUTANEOUS at 05:06

## 2020-08-23 RX ADMIN — CEFTRIAXONE 100 MILLIGRAM(S): 500 INJECTION, POWDER, FOR SOLUTION INTRAMUSCULAR; INTRAVENOUS at 13:44

## 2020-08-23 RX ADMIN — Medication 125 MILLIGRAM(S): at 23:59

## 2020-08-23 RX ADMIN — Medication 1 MILLIGRAM(S): at 09:26

## 2020-08-23 RX ADMIN — INSULIN GLARGINE 14 UNIT(S): 100 INJECTION, SOLUTION SUBCUTANEOUS at 22:09

## 2020-08-23 RX ADMIN — SODIUM CHLORIDE 3 MILLILITER(S): 9 INJECTION INTRAMUSCULAR; INTRAVENOUS; SUBCUTANEOUS at 22:07

## 2020-08-23 RX ADMIN — Medication 2 GRAM(S): at 17:32

## 2020-08-23 RX ADMIN — Medication 30 MILLIGRAM(S): at 09:28

## 2020-08-23 RX ADMIN — Medication 120 MILLIGRAM(S): at 09:27

## 2020-08-23 RX ADMIN — Medication 5 UNIT(S): at 09:27

## 2020-08-23 RX ADMIN — Medication 1000 UNIT(S): at 09:26

## 2020-08-23 RX ADMIN — Medication 125 MILLIGRAM(S): at 17:32

## 2020-08-23 RX ADMIN — Medication 2 GRAM(S): at 05:09

## 2020-08-23 RX ADMIN — SODIUM CHLORIDE 75 MILLILITER(S): 9 INJECTION INTRAMUSCULAR; INTRAVENOUS; SUBCUTANEOUS at 09:28

## 2020-08-23 RX ADMIN — EVEROLIMUS 0.75 MILLIGRAM(S): 10 TABLET ORAL at 09:26

## 2020-08-23 RX ADMIN — Medication 20 MILLIGRAM(S): at 22:09

## 2020-08-23 RX ADMIN — ATOVAQUONE 1500 MILLIGRAM(S): 750 SUSPENSION ORAL at 09:26

## 2020-08-23 RX ADMIN — PANTOPRAZOLE SODIUM 40 MILLIGRAM(S): 20 TABLET, DELAYED RELEASE ORAL at 09:27

## 2020-08-23 RX ADMIN — POSACONAZOLE 300 MILLIGRAM(S): 100 TABLET, DELAYED RELEASE ORAL at 09:28

## 2020-08-23 NOTE — PROGRESS NOTE ADULT - PROBLEM SELECTOR PLAN 1
Heart Transplant Team Following: f/u recs   -C/w everolimus 0.75mg BID (level Goal 8-10)   -C/w Prednisone taper   -C/w posaconazole for ppx  -Continue to hold bactrim/valcyte.   -Off Cellcept while on high dose steroids. Will eventually need to start Cellcept as steroids weaned. Heart Transplant Team Following: f/u recs   -C/w everolimus   -C/w Prednisone taper   -C/w posaconazole for ppx  -Continue to hold bactrim/valcyte.   -Off Cellcept while on high dose steroids. Will eventually need to start Cellcept as steroids weaned.

## 2020-08-23 NOTE — PROGRESS NOTE ADULT - SUBJECTIVE AND OBJECTIVE BOX
VITAL SIGNS    Telemetry:  sr   110    Vital Signs Last 24 Hrs  T(C): 36.7 (08-23-20 @ 05:23), Max: 36.7 (08-23-20 @ 05:23)  T(F): 98 (08-23-20 @ 05:23), Max: 98 (08-23-20 @ 05:23)  HR: 118 (08-23-20 @ 05:23) (90 - 118)  BP: 103/64 (08-23-20 @ 05:23) (102/70 - 103/64)  RR: 18 (08-23-20 @ 05:23) (18 - 18)  SpO2: 94% (08-23-20 @ 05:23) (94% - 99%)                   Daily     Daily         CAPILLARY BLOOD GLUCOSE      POCT Blood Glucose.: 133 mg/dL (22 Aug 2020 21:29)  POCT Blood Glucose.: 123 mg/dL (22 Aug 2020 17:06)  POCT Blood Glucose.: 143 mg/dL (22 Aug 2020 11:51)  POCT Blood Glucose.: 93 mg/dL (22 Aug 2020 07:51)                         PHYSICAL EXAM  S  "  Neurology: alert and oriented x 3, moves all extremities with no defecits  CV :  RRR  Sternal Wound :  CDI , Stable  Lungs:   CTA B/L  Abdomen: soft, nontender, nondistended, positive bowel sounds, last bowel movement   Extremities: VITAL SIGNS    Telemetry:  sr   110    Vital Signs Last 24 Hrs  T(C): 36.7 (08-23-20 @ 05:23), Max: 36.7 (08-23-20 @ 05:23)  T(F): 98 (08-23-20 @ 05:23), Max: 98 (08-23-20 @ 05:23)  HR: 118 (08-23-20 @ 05:23) (90 - 118)  BP: 103/64 (08-23-20 @ 05:23) (102/70 - 103/64)  RR: 18 (08-23-20 @ 05:23) (18 - 18)  SpO2: 94% (08-23-20 @ 05:23) (94% - 99%)                   Daily     Daily         CAPILLARY BLOOD GLUCOSE      POCT Blood Glucose.: 133 mg/dL (22 Aug 2020 21:29)  POCT Blood Glucose.: 123 mg/dL (22 Aug 2020 17:06)  POCT Blood Glucose.: 143 mg/dL (22 Aug 2020 11:51)  POCT Blood Glucose.: 93 mg/dL (22 Aug 2020 07:51)                         PHYSICAL EXAM  S  "   I feel ok    My abdome is always distended"  Neurology: alert and oriented x 3, moves all extremities with no defecits  CV :  RRR  Sternal Wound :  CDI , Stable  Lungs:   CTA B/L  Abdomen: soft, nontender, distended, positive bowel sounds, last bowel movement 8/23  Extremities:     plus one pedal edema

## 2020-08-23 NOTE — PROGRESS NOTE ADULT - ASSESSMENT
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 Patient was treated w/ prednisone and Rituximab. Tacro was discontinued and patient was also transitioned to everolimus. Admitted again 6/16-6/19 for hyperglycemia. On 8/11 Reported to transplant team having one done black tarry stool-  instructed to present to [facility] for hemolytic anemia. Patient has been following with Hematology outpatient.  Denies CP  N/V diarrhea SOB. (11 Aug 2020 20:08)  Surgery/Hospital Course:  8/11 Admitted to [facility] with symptomatic anemia  8/13 EGD for investigation of tarry stools  8/14 CTU for SVT and respiratory distress   8/15 SB capsule: stomach & SB lesions, likely ulcers.  8/17 EGD/push enteroscopy w/ angioectasias/erosions in small bowel. Gastropathy and esophagitis. Ulcer seen on VCE most likely scope trauma.    8/18 VSS transferred back to floor.   8/19 VSS, H/H stable 8.8/24.5, would continue to hold asa per transplant team. Follow up CMV PCR and gastric biopsy.   8/20 VS 9.0/28.4 stable Gastric biopsy results LA Grade A esophagitis.  - Acute gastritis. Biopsies taken to r/o CMV, No ulceration seen despite finding on capsule endoscopy - likely 2/2 scope  trauma that has since resolved. Pathology pending.  8/21 VSS H/H  8.1/25.7  trending down will monitor prednisone taper 30 mg today. Procardia 120 initiated today [facility] biopsy Monday.   8/22 VVS; Patient reports loose stools x 2-3 days with 1 episode today.  Stool sent for C-dif and GI PCR. Abdominal X-ray revealed: dilated loops of bowel. Abdomen distended.  Patient denies N/V. GI called to re-evaluate. Continue with current medication regimen.  Awaiting for upcoming cardiac biopsy on Monday 8/24.  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 Patient was treated w/ prednisone and Rituximab. Tacro was discontinued and patient was also transitioned to everolimus. Admitted again 6/16-6/19 for hyperglycemia. On 8/11 Reported to transplant team having one done black tarry stool-  instructed to present to [facility] for hemolytic anemia. Patient has been following with Hematology outpatient.  Denies CP  N/V diarrhea SOB. (11 Aug 2020 20:08)  Surgery/Hospital Course:  8/11 Admitted to [facility] with symptomatic anemia  8/13 EGD for investigation of tarry stools  8/14 CTU for SVT and respiratory distress   8/15 SB capsule: stomach & SB lesions, likely ulcers.  8/17 EGD/push enteroscopy w/ angioectasias/erosions in small bowel. Gastropathy and esophagitis. Ulcer seen on VCE most likely scope trauma.    8/18 VSS transferred back to floor.   8/19 VSS, H/H stable 8.8/24.5, would continue to hold asa per transplant team. Follow up CMV PCR and gastric biopsy.   8/20 VS 9.0/28.4 stable Gastric biopsy results LA Grade A esophagitis.  - Acute gastritis. Biopsies taken to r/o CMV, No ulceration seen despite finding on capsule endoscopy - likely 2/2 scope  trauma that has since resolved. Pathology pending.  8/21 VSS H/H  8.1/25.7  trending down will monitor prednisone taper 30 mg today. Procardia 120 initiated today [facility] biopsy Monday.   8/22 VVS; Patient reports loose stools x 2-3 days with 1 episode today.  Stool sent for C-dif and GI PCR. Abdominal X-ray revealed: dilated loops of bowel. Abdomen distended.  Patient denies N/V. GI called to re-evaluate. Continue with current medication regimen.  Awaiting for upcoming cardiac biopsy on Monday 8/24.  8/23   vss    creat up to 2.28 ,  repeating CMP,  TTE ordered  Bladder scan

## 2020-08-23 NOTE — PROGRESS NOTE ADULT - SUBJECTIVE AND OBJECTIVE BOX
INFECTIOUS DISEASES FOLLOW UP-- Sujey Lujan  [phone]    This is a follow up note for this  70yMale with  Anemia  s/p OHTx 2/18  interval development of abdominal distension and diarrhea- C.diff suspected      ROS:  CONSTITUTIONAL:  No fever, good appetite  CARDIOVASCULAR:  No chest pain or palpitations  RESPIRATORY:  No dyspnea  GASTROINTESTINAL:  c/o abdominal distension  GENITOURINARY:  No dysuria  NEUROLOGIC:  No headache,     Allergies    No Known Allergies    Intolerances        ANTIBIOTICS/RELEVANT:  antimicrobials  atovaquone Suspension 1500 milliGRAM(s) Oral daily  cefTRIAXone   IVPB 2000 milliGRAM(s) IV Intermittent every 24 hours  posaconazole DR Tablet 300 milliGRAM(s) Oral daily  vancomycin    Solution 125 milliGRAM(s) Oral every 6 hours    immunologic:  everolimus (ZORTRESS) 0.75 milliGRAM(s) Oral <User Schedule>    OTHER:  acetaminophen   Tablet .. 650 milliGRAM(s) Oral every 6 hours PRN  cholecalciferol 1000 Unit(s) Oral daily  dextrose 5%. 1000 milliLiter(s) IV Continuous <Continuous>  folic acid 1 milliGRAM(s) Oral daily  insulin glargine Injectable (LANTUS) 14 Unit(s) SubCutaneous at bedtime  insulin lispro (HumaLOG) corrective regimen sliding scale   SubCutaneous three times a day before meals  insulin lispro Injectable (HumaLOG) 5 Unit(s) SubCutaneous three times a day with meals  magnesium oxide 400 milliGRAM(s) Oral daily  NIFEdipine  milliGRAM(s) Oral <User Schedule>  omega-3-Acid Ethyl Esters 2 Gram(s) Oral two times a day  pantoprazole  Injectable 40 milliGRAM(s) IV Push daily  pravastatin 20 milliGRAM(s) Oral at bedtime  predniSONE   Tablet 30 milliGRAM(s) Oral <User Schedule>  sodium chloride 0.9% lock flush 3 milliLiter(s) IV Push every 8 hours  sodium chloride 0.9%. 1000 milliLiter(s) IV Continuous <Continuous>      Objective:  Vital Signs Last 24 Hrs  T(C): 36.7 (23 Aug 2020 05:23), Max: 36.7 (23 Aug 2020 05:23)  T(F): 98 (23 Aug 2020 05:23), Max: 98 (23 Aug 2020 05:23)  HR: 116 (23 Aug 2020 10:00) (116 - 118)  BP: 105/66 (23 Aug 2020 10:00) (103/64 - 105/66)  BP(mean): --  RR: 18 (23 Aug 2020 05:23) (18 - 18)  SpO2: 94% (23 Aug 2020 05:23) (94% - 94%)    PHYSICAL EXAM:  Constitutional:no acute distress, but lying in bed instead of the chair  Eyes:WALT, EOMI  Ear/Nose/Throat: no oral lesions, 	  Respiratory: clear BL  Cardiovascular: S1S2 sternotomy healed  Gastrointestinal: distended,not overly tender on exam  Extremities:no e/e/c  No Lymphadenopathy  IV sites not inflammed.    LABS:                        7.5    5.47  )-----------( 274      ( 23 Aug 2020 06:08 )             23.9     08-23    138  |  101  |  49<H>  ----------------------------<  115<H>  3.8   |  25  |  2.31<H>    Ca    8.5      23 Aug 2020 10:51  Phos  2.7     08-22  Mg     2.4     08-22    TPro  5.8<L>  /  Alb  3.6  /  TBili  0.6  /  DBili  x   /  AST  43<H>  /  ALT  37  /  AlkPhos  59  08-23          MICROBIOLOGY:  Culture Results:   GI PCR Results: NOT detected  *******Please Note:*******  GI panel PCR evaluates for:  Campylobacter, Plesiomonas shigelloides, Salmonella,  Vibrio, Yersinia enterocolitica, Enteroaggregative  Escherichia coli (EAEC), Enteropathogenic E.coli (EPEC),  Enterotoxigenic E. coli (ETEC) lt/st, Shiga-like  toxin-producing E. coli (STEC) stx1/stx2,  Shigella/ Enteroinvasive E. coli (EIEC), Cryptosporidium,  Cyclospora cayetanensis, Entamoeba histolytica,  Giardia lamblia, Adenovirus F 40/41, Astrovirus,  Norovirus GI/GII, Rotavirus A, Sapovirus (08-23 @ 02:01)            RECENT CULTURES:  08-23 @ 02:01  .Stool Feces  --  --  --    GI PCR Results: NOT detected  *******Please Note:*******  GI panel PCR evaluates for:  Campylobacter, Plesiomonas shigelloides, Salmonella,  Vibrio, Yersinia enterocolitica, Enteroaggregative  Escherichia coli (EAEC), Enteropathogenic E.coli (EPEC),  Enterotoxigenic E. coli (ETEC) lt/st, Shiga-like  toxin-producing E. coli (STEC) stx1/stx2,  Shigella/ Enteroinvasive E. coli (EIEC), Cryptosporidium,  Cyclospora cayetanensis, Entamoeba histolytica,  Giardia lamblia, Adenovirus F 40/41, Astrovirus,  Norovirus GI/GII, Rotavirus A, Sapovirus  --      RADIOLOGY & ADDITIONAL STUDIES:

## 2020-08-23 NOTE — CHART NOTE - NSCHARTNOTEFT_GEN_A_CORE
GI informed about CT findings. CT showed rectal wall thickening, suggestive of proctitis, no obstruction/ileus/dilated colon. Pt continues to have regular BMs. At this time, would recommend ensuring pt has regular BMs, avoid constipation. As pt is clinically asymptomatic, would not offer additional intervention at this time.

## 2020-08-23 NOTE — PROGRESS NOTE ADULT - PROBLEM SELECTOR PLAN 2
Abdominal X-ray revealed dilated loops of bowel   GI called for further evaluation  Stool sent for C-dif and GI PCR. Abdominal X-ray revealed dilated loops of bowel   GI called   Stool   pending for C-dif and GI PCR.

## 2020-08-23 NOTE — PROGRESS NOTE ADULT - PROBLEM SELECTOR PLAN 1
-C/w statin for TCAD ppx  -ASA on hold given GIB  -C/w everolimus, however, decrease dose to 0.5. Goal 8-10.  -Steroid taper as written   -C/w posiconazole for ppx; Bactrim and Valcyte on hold given leukopenia  -Off Cellcept while on high dose steroids. And now c. diff infection   -Repeat TTE reviewed; unremarkable  - Biopsy Monday

## 2020-08-23 NOTE — PROGRESS NOTE ADULT - ASSESSMENT
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 He has responded appropriately to a single blood transfusion. Of note, he recently has developed dark colored stools. Will investigate if anemia is due to GI bleeding in setting of steroid use or hemolysis and manage appropriately. Underwent UGI without bleeding source identified  Developed gram negative sepsis requiring transfer to CTU  Clinically improving    #Klebsiella oxytoca bacteremia:  Meropenem (8/14-8/17)  Cefepime (8/17-20)  Ceftriaxone (8/20-)  - Growing in blood cultures 2/2 sets on 8/13  - Unclear source  - Repeat blood cultures x2 sets 8/14 no growth final  - tentative plan is to continue ceftriaxone until Monday 8/24 to finish a 10-day course    #OI prophylaxis-  -has been receiving high dose steroids since 5/20  -CMV viral load(8/17): neg  -Valcyte and Bactrim d/c'ed on 8/17 due to leukopenia  -Galactomann<0.5, Fungitell<31,    -c/w Atovaquone 1500mg daily for PCP ppx given on steroid   -Per hemoc, will taper prednisone every 7 days by 10mg    #Pancytopenia- on admission  -Remains with significant leukopenia- especially lymphocyte lines  -COVID PCR neg  -Tick panel neg  -would repeat chest CT scan to see status of prior RUL scarring vs. nodule once patient is stable    #Anemia- r/o GI bleeding    EGD 8/17: esophagitis, acute gastritis s/p biopsy, no ulceration  Consider PPI or H2b    # overnight 8/22 developed multiple episodes of watery diarrhea  Abdominal distension noted on exam  GI- PCR was negative for pathogens  send stool for C.diff testing  Would begin oral Vancomycin 125mg qid  Would obtain abdominal CT scan without contrast      Gary Lujan MD  [phone]  After 5pm/weekends [phone]

## 2020-08-23 NOTE — PROGRESS NOTE ADULT - ATTENDING COMMENTS
No change in GI symptoms. 2X diahrrea yesterday. Mainly gas.   Empiric PO Vanco initiated.   Seen by Gi yesterday for conservative management.   worsening Cr. Send for Abdo CT pending.   meds: evero .75 bid (10.5), pred 30 QD, mepron 1500, Nifedipine 120. posi, prava, PPI. Mg Ceftriaxome last day tomorrow.   Afebrile, , 110/-120/  08-23    138  |  101  |  49<H>  ----------------------------<  115<H>  3.8   |  25  |  2.31<H>  Cr 2.3, 1.3.   Ca    8.5      23 Aug 2020 10:51  Phos  2.7     08-22  Mg     2.4     08-22  TPro  5.8<L>  /  Alb  3.6  /  TBili  0.6  /  DBili  x   /  AST  43<H>  /  ALT  37  /  AlkPhos  59  08-23                        7.5    5.47  )-----------( 274      ( 23 Aug 2020 06:08 )             23.9   Abdo symptoms. Worsening Cr. Relative rise in WBC.   r/o CDiff.   Plan:  Review abdo CT.   IV fluids.   Ask Dr Lujan to evaluate.   Decrease everolimus .5 bid in view of level.   May still undergo Bx tomorrow.   Last dose ceftriaxome tomorrow.   Marvin Campbell

## 2020-08-23 NOTE — PROGRESS NOTE ADULT - ASSESSMENT
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 He has received a total of 3 units PRBC this admission with slow downtrend of hemoglobin. Labs were not consistent with hemolysis and he reported dark stools for which EGD/enteroscopy was performed which did not reveal source of bleeding but preliminary VCE showing possible gastric ulcer, duodenal angioectasia/erosions. He developed acute respiratory distress and profound sinus tachycardia on 8/13 in setting of Klebsiella bacteremia of unclear origin (preceded EGD) for which CT performed which suggests possible pneumonia. He has clinically improved with antibiotics, however, still has diarrhea and a distended abd.     Review of pertinent studies  8/2020 labs: Direct Jacky +, 4.6% reticulocytes with low RI, normal bilirubin,  (stable). Haptoglobin normal.

## 2020-08-23 NOTE — PROGRESS NOTE ADULT - SUBJECTIVE AND OBJECTIVE BOX
Patient seen and examined at bedside.    Overnight Events:       REVIEW OF SYSTEMS:  Constitutional:     [ ] negative [ ] fevers [ ] chills [ ] weight loss [ ] weight gain  HEENT:                  [ ] negative [ ] dry eyes [ ] eye irritation [ ] postnasal drip [ ] nasal congestion  CV:                         [ ] negative  [ ] chest pain [ ] orthopnea [ ] palpitations [ ] murmur  Resp:                     [ ] negative [ ] cough [ ] shortness of breath [ ] dyspnea [ ] wheezing [ ] sputum [ ]hemoptysis  GI:                          [ ] negative [ ] nausea [ ] vomiting [ ] diarrhea [ ] constipation [ ] abd pain [ ] dysphagia   :                        [ ] negative [ ] dysuria [ ] nocturia [ ] hematuria [ ] increased urinary frequency  Musculoskeletal: [ ] negative [ ] back pain [ ] myalgias [ ] arthralgias [ ] fracture  Skin:                       [ ] negative [ ] rash [ ] itch  Neurological:        [ ] negative [ ] headache [ ] dizziness [ ] syncope [ ] weakness [ ] numbness  Psychiatric:           [ ] negative [ ] anxiety [ ] depression  Endocrine:            [ ] negative [ ] diabetes [ ] thyroid problem  Heme/Lymph:      [ ] negative [ ] anemia [ ] bleeding problem  Allergic/Immune: [ ] negative [ ] itchy eyes [ ] nasal discharge [ ] hives [ ] angioedema    [ ] All other systems negative  [ ] Unable to assess ROS due to    Current Meds:  acetaminophen   Tablet .. 650 milliGRAM(s) Oral every 6 hours PRN  atovaquone Suspension 1500 milliGRAM(s) Oral daily  cefTRIAXone   IVPB 2000 milliGRAM(s) IV Intermittent every 24 hours  cholecalciferol 1000 Unit(s) Oral daily  dextrose 5%. 1000 milliLiter(s) IV Continuous <Continuous>  everolimus (ZORTRESS) 0.75 milliGRAM(s) Oral <User Schedule>  folic acid 1 milliGRAM(s) Oral daily  insulin glargine Injectable (LANTUS) 14 Unit(s) SubCutaneous at bedtime  insulin lispro (HumaLOG) corrective regimen sliding scale   SubCutaneous three times a day before meals  insulin lispro Injectable (HumaLOG) 5 Unit(s) SubCutaneous three times a day with meals  magnesium oxide 400 milliGRAM(s) Oral daily  NIFEdipine  milliGRAM(s) Oral <User Schedule>  omega-3-Acid Ethyl Esters 2 Gram(s) Oral two times a day  pantoprazole  Injectable 40 milliGRAM(s) IV Push daily  posaconazole DR Tablet 300 milliGRAM(s) Oral daily  pravastatin 20 milliGRAM(s) Oral at bedtime  predniSONE   Tablet 30 milliGRAM(s) Oral <User Schedule>  sodium chloride 0.9% lock flush 3 milliLiter(s) IV Push every 8 hours  sodium chloride 0.9%. 1000 milliLiter(s) IV Continuous <Continuous>  vancomycin    Solution 125 milliGRAM(s) Oral every 6 hours      PAST MEDICAL & SURGICAL HISTORY:  H/O hemolytic anemia  H/O autoimmune hemolytic anemia  Knee pain, right  HLD (hyperlipidemia)  Former smoker  DVT of upper extremity (deep vein thrombosis)  Hepatitis C virus  GIB (gastrointestinal bleeding)  Ventricular fibrillation: s/p AICD  PAF (paroxysmal atrial fibrillation): on xarelto  Non-Ischemic Cardiomyopathy  SVT (Supraventricular Tachycardia)  HTN  CHF (Congestive Heart Failure)  S/P right heart catheterization: biopsy multiple  H/O heart transplant: 2/2018  Status post left hip replacement  History of Prior Ablation Treatment: for afib  AICD (Automatic Cardioverter/Defibrillator) Present: [employer] with 1 [employer] lead4/1/09- explanted and replaced with [employer] 2 leads on 9/2/09      Vitals:  T(F): 98 (08-23), Max: 98 (08-23)  HR: 116 (08-23) (90 - 118)  BP: 105/66 (08-23) (102/70 - 105/66)  RR: 18 (08-23)  SpO2: 94% (08-23)  I&O's Summary    22 Aug 2020 07:01  -  23 Aug 2020 07:00  --------------------------------------------------------  IN: 880 mL / OUT: 500 mL / NET: 380 mL    23 Aug 2020 07:01  -  23 Aug 2020 13:29  --------------------------------------------------------  IN: 795 mL / OUT: 0 mL / NET: 795 mL        Physical Exam:  Appearance: No acute distress; well appearing  Eyes: PERRL, EOMI, pink conjunctiva  HENT: Normal oral mucosa  Cardiovascular: RRR, S1, S2, no murmurs, rubs, or gallops; no edema; no JVD  Respiratory: Clear to auscultation bilaterally  Gastrointestinal: soft, non-tender, non-distended with normal bowel sounds  Musculoskeletal: No clubbing; no joint deformity   Neurologic: Non-focal  Lymphatic: No lymphadenopathy  Psychiatry: AAOx3, mood & affect appropriate  Skin: No rashes, ecchymoses, or cyanosis                          7.5    5.47  )-----------( 274      ( 23 Aug 2020 06:08 )             23.9     08-23    138  |  101  |  49<H>  ----------------------------<  115<H>  3.8   |  25  |  2.31<H>    Ca    8.5      23 Aug 2020 10:51  Phos  2.7     08-22  Mg     2.4     08-22    TPro  5.8<L>  /  Alb  3.6  /  TBili  0.6  /  DBili  x   /  AST  43<H>  /  ALT  37  /  AlkPhos  59  08-23 Patient seen and examined at bedside.    Overnight Events: Pt states that his abd feel bloated, but is not having pain.       REVIEW OF SYSTEMS:  Constitutional:     [ ] negative [ ] fevers [ ] chills [ ] weight loss [ ] weight gain  HEENT:                  [ ] negative [ ] dry eyes [ ] eye irritation [ ] postnasal drip [ ] nasal congestion  CV:                         [ ] negative  [ ] chest pain [ ] orthopnea [ ] palpitations [ ] murmur  Resp:                     [ ] negative [ ] cough [ ] shortness of breath [ ] dyspnea [ ] wheezing [ ] sputum [ ]hemoptysis  GI:                          [ ] negative [ ] nausea [ ] vomiting [ ] diarrhea [ ] constipation [ ] abd pain [ ] dysphagia   :                        [ ] negative [ ] dysuria [ ] nocturia [ ] hematuria [ ] increased urinary frequency  Musculoskeletal: [ ] negative [ ] back pain [ ] myalgias [ ] arthralgias [ ] fracture  Skin:                       [ ] negative [ ] rash [ ] itch  Neurological:        [ ] negative [ ] headache [ ] dizziness [ ] syncope [ ] weakness [ ] numbness  Psychiatric:           [ ] negative [ ] anxiety [ ] depression  Endocrine:            [ ] negative [ ] diabetes [ ] thyroid problem  Heme/Lymph:      [ ] negative [ ] anemia [ ] bleeding problem  Allergic/Immune: [ ] negative [ ] itchy eyes [ ] nasal discharge [ ] hives [ ] angioedema    [x ] All other systems negative  [ ] Unable to assess ROS due to    Current Meds:  acetaminophen   Tablet .. 650 milliGRAM(s) Oral every 6 hours PRN  atovaquone Suspension 1500 milliGRAM(s) Oral daily  cefTRIAXone   IVPB 2000 milliGRAM(s) IV Intermittent every 24 hours  cholecalciferol 1000 Unit(s) Oral daily  dextrose 5%. 1000 milliLiter(s) IV Continuous <Continuous>  everolimus (ZORTRESS) 0.75 milliGRAM(s) Oral <User Schedule>  folic acid 1 milliGRAM(s) Oral daily  insulin glargine Injectable (LANTUS) 14 Unit(s) SubCutaneous at bedtime  insulin lispro (HumaLOG) corrective regimen sliding scale   SubCutaneous three times a day before meals  insulin lispro Injectable (HumaLOG) 5 Unit(s) SubCutaneous three times a day with meals  magnesium oxide 400 milliGRAM(s) Oral daily  NIFEdipine  milliGRAM(s) Oral <User Schedule>  omega-3-Acid Ethyl Esters 2 Gram(s) Oral two times a day  pantoprazole  Injectable 40 milliGRAM(s) IV Push daily  posaconazole DR Tablet 300 milliGRAM(s) Oral daily  pravastatin 20 milliGRAM(s) Oral at bedtime  predniSONE   Tablet 30 milliGRAM(s) Oral <User Schedule>  sodium chloride 0.9% lock flush 3 milliLiter(s) IV Push every 8 hours  sodium chloride 0.9%. 1000 milliLiter(s) IV Continuous <Continuous>  vancomycin    Solution 125 milliGRAM(s) Oral every 6 hours      PAST MEDICAL & SURGICAL HISTORY:  H/O hemolytic anemia  H/O autoimmune hemolytic anemia  Knee pain, right  HLD (hyperlipidemia)  Former smoker  DVT of upper extremity (deep vein thrombosis)  Hepatitis C virus  GIB (gastrointestinal bleeding)  Ventricular fibrillation: s/p AICD  PAF (paroxysmal atrial fibrillation): on xarelto  Non-Ischemic Cardiomyopathy  SVT (Supraventricular Tachycardia)  HTN  CHF (Congestive Heart Failure)  S/P right heart catheterization: biopsy multiple  H/O heart transplant: 2/2018  Status post left hip replacement  History of Prior Ablation Treatment: for afib  AICD (Automatic Cardioverter/Defibrillator) Present: [employer] with 1 [employer] lead4/1/09- explanted and replaced with [employer] 2 leads on 9/2/09      Vitals:  T(F): 98 (08-23), Max: 98 (08-23)  HR: 116 (08-23) (90 - 118)  BP: 105/66 (08-23) (102/70 - 105/66)  RR: 18 (08-23)  SpO2: 94% (08-23)  I&O's Summary    22 Aug 2020 07:01  -  23 Aug 2020 07:00  --------------------------------------------------------  IN: 880 mL / OUT: 500 mL / NET: 380 mL    23 Aug 2020 07:01  -  23 Aug 2020 13:29  --------------------------------------------------------  IN: 795 mL / OUT: 0 mL / NET: 795 mL        Physical Exam:  Appearance: No acute distress; well appearing  Eyes: PERRL, EOMI, pink conjunctiva  HENT: Normal oral mucosa  Cardiovascular: RRR, S1, S2, no murmurs, rubs, or gallops; no edema; no JVD  Respiratory: Clear to auscultation bilaterally  Gastrointestinal: soft, non-tender, distended with normal bowel sounds  Musculoskeletal: No clubbing; no joint deformity   Neurologic: Non-focal  Lymphatic: No lymphadenopathy  Psychiatry: AAOx3, mood & affect appropriate  Skin: No rashes, ecchymoses, or cyanosis                          7.5    5.47  )-----------( 274      ( 23 Aug 2020 06:08 )             23.9     08-23    138  |  101  |  49<H>  ----------------------------<  115<H>  3.8   |  25  |  2.31<H>    Ca    8.5      23 Aug 2020 10:51  Phos  2.7     08-22  Mg     2.4     08-22    TPro  5.8<L>  /  Alb  3.6  /  TBili  0.6  /  DBili  x   /  AST  43<H>  /  ALT  37  /  AlkPhos  59  08-23

## 2020-08-23 NOTE — PROGRESS NOTE ADULT - PROBLEM SELECTOR PLAN 3
-Appr hem/onc recs  -Likely 2/2 active infxn  -C/w tx for bacteremia and c. diff, as below  -C/w trend CBC  -Await CMV PCR; GI bx unremarkable

## 2020-08-23 NOTE — PROGRESS NOTE ADULT - PROBLEM SELECTOR PLAN 3
- Stable H&H,   -Appreciate GI recs; repeat EGD/push enteroscopy- gastritis with no active bleeding seen. F/U gastric biopsy r/o CMV  - ASA dc'd - Stable H&H,   -Appreciate GI recs; repeat EGD/push enteroscopy- gastritis with no active bleeding seen. F/U gastric biopsy r/o CMV

## 2020-08-24 DIAGNOSIS — R60.0 LOCALIZED EDEMA: ICD-10-CM

## 2020-08-24 LAB
ANION GAP SERPL CALC-SCNC: 16 MMOL/L — SIGNIFICANT CHANGE UP (ref 5–17)
BUN SERPL-MCNC: 59 MG/DL — HIGH (ref 7–23)
CALCIUM SERPL-MCNC: 8.7 MG/DL — SIGNIFICANT CHANGE UP (ref 8.4–10.5)
CHLORIDE SERPL-SCNC: 100 MMOL/L — SIGNIFICANT CHANGE UP (ref 96–108)
CHLORIDE UR-SCNC: <35 MMOL/L — SIGNIFICANT CHANGE UP
CO2 SERPL-SCNC: 21 MMOL/L — LOW (ref 22–31)
CREAT ?TM UR-MCNC: 188 MG/DL — SIGNIFICANT CHANGE UP
CREAT SERPL-MCNC: 2.74 MG/DL — HIGH (ref 0.5–1.3)
GLUCOSE BLDC GLUCOMTR-MCNC: 119 MG/DL — HIGH (ref 70–99)
GLUCOSE BLDC GLUCOMTR-MCNC: 121 MG/DL — HIGH (ref 70–99)
GLUCOSE BLDC GLUCOMTR-MCNC: 157 MG/DL — HIGH (ref 70–99)
GLUCOSE BLDC GLUCOMTR-MCNC: 160 MG/DL — HIGH (ref 70–99)
GLUCOSE SERPL-MCNC: 109 MG/DL — HIGH (ref 70–99)
HCT VFR BLD CALC: 24.4 % — LOW (ref 39–50)
HGB BLD-MCNC: 7.2 G/DL — LOW (ref 13–17)
MAGNESIUM SERPL-MCNC: 2.8 MG/DL — HIGH (ref 1.6–2.6)
MCHC RBC-ENTMCNC: 29.5 GM/DL — LOW (ref 32–36)
MCHC RBC-ENTMCNC: 30.3 PG — SIGNIFICANT CHANGE UP (ref 27–34)
MCV RBC AUTO: 102.5 FL — HIGH (ref 80–100)
NRBC # BLD: 0 /100 WBCS — SIGNIFICANT CHANGE UP (ref 0–0)
PHOSPHATE SERPL-MCNC: 5.3 MG/DL — HIGH (ref 2.5–4.5)
PLATELET # BLD AUTO: 279 K/UL — SIGNIFICANT CHANGE UP (ref 150–400)
POTASSIUM SERPL-MCNC: 3.6 MMOL/L — SIGNIFICANT CHANGE UP (ref 3.5–5.3)
POTASSIUM SERPL-SCNC: 3.6 MMOL/L — SIGNIFICANT CHANGE UP (ref 3.5–5.3)
RBC # BLD: 2.38 M/UL — LOW (ref 4.2–5.8)
RBC # FLD: 19.5 % — HIGH (ref 10.3–14.5)
SODIUM SERPL-SCNC: 137 MMOL/L — SIGNIFICANT CHANGE UP (ref 135–145)
SODIUM UR-SCNC: <35 MMOL/L — SIGNIFICANT CHANGE UP
WBC # BLD: 7.18 K/UL — SIGNIFICANT CHANGE UP (ref 3.8–10.5)
WBC # FLD AUTO: 7.18 K/UL — SIGNIFICANT CHANGE UP (ref 3.8–10.5)

## 2020-08-24 PROCEDURE — 99233 SBSQ HOSP IP/OBS HIGH 50: CPT | Mod: GC

## 2020-08-24 PROCEDURE — 99232 SBSQ HOSP IP/OBS MODERATE 35: CPT | Mod: GC

## 2020-08-24 PROCEDURE — 99232 SBSQ HOSP IP/OBS MODERATE 35: CPT

## 2020-08-24 RX ORDER — VANCOMYCIN HCL 1 G
500 VIAL (EA) INTRAVENOUS EVERY 6 HOURS
Refills: 0 | Status: DISCONTINUED | OUTPATIENT
Start: 2020-08-24 | End: 2020-09-17

## 2020-08-24 RX ORDER — METRONIDAZOLE 500 MG
500 TABLET ORAL EVERY 8 HOURS
Refills: 0 | Status: DISCONTINUED | OUTPATIENT
Start: 2020-08-24 | End: 2020-09-01

## 2020-08-24 RX ORDER — METRONIDAZOLE 500 MG
500 TABLET ORAL ONCE
Refills: 0 | Status: COMPLETED | OUTPATIENT
Start: 2020-08-24 | End: 2020-08-24

## 2020-08-24 RX ORDER — METRONIDAZOLE 500 MG
TABLET ORAL
Refills: 0 | Status: DISCONTINUED | OUTPATIENT
Start: 2020-08-24 | End: 2020-09-01

## 2020-08-24 RX ADMIN — Medication 1 MILLIGRAM(S): at 09:14

## 2020-08-24 RX ADMIN — Medication 125 MILLIGRAM(S): at 11:02

## 2020-08-24 RX ADMIN — POSACONAZOLE 300 MILLIGRAM(S): 100 TABLET, DELAYED RELEASE ORAL at 09:15

## 2020-08-24 RX ADMIN — Medication 1000 UNIT(S): at 09:14

## 2020-08-24 RX ADMIN — Medication 500 MILLIGRAM(S): at 23:49

## 2020-08-24 RX ADMIN — Medication 30 MILLIGRAM(S): at 09:15

## 2020-08-24 RX ADMIN — Medication 120 MILLIGRAM(S): at 09:14

## 2020-08-24 RX ADMIN — Medication 2 GRAM(S): at 17:18

## 2020-08-24 RX ADMIN — Medication 2: at 17:22

## 2020-08-24 RX ADMIN — Medication 500 MILLIGRAM(S): at 17:18

## 2020-08-24 RX ADMIN — Medication 20 MILLIGRAM(S): at 23:04

## 2020-08-24 RX ADMIN — MAGNESIUM OXIDE 400 MG ORAL TABLET 400 MILLIGRAM(S): 241.3 TABLET ORAL at 09:14

## 2020-08-24 RX ADMIN — CEFTRIAXONE 100 MILLIGRAM(S): 500 INJECTION, POWDER, FOR SOLUTION INTRAMUSCULAR; INTRAVENOUS at 14:25

## 2020-08-24 RX ADMIN — Medication 2 GRAM(S): at 05:34

## 2020-08-24 RX ADMIN — Medication 100 MILLIGRAM(S): at 11:02

## 2020-08-24 RX ADMIN — Medication 100 MILLIGRAM(S): at 22:48

## 2020-08-24 RX ADMIN — SODIUM CHLORIDE 3 MILLILITER(S): 9 INJECTION INTRAMUSCULAR; INTRAVENOUS; SUBCUTANEOUS at 22:12

## 2020-08-24 RX ADMIN — EVEROLIMUS 0.5 MILLIGRAM(S): 10 TABLET ORAL at 09:13

## 2020-08-24 RX ADMIN — Medication 100 MILLIGRAM(S): at 14:25

## 2020-08-24 RX ADMIN — INSULIN GLARGINE 14 UNIT(S): 100 INJECTION, SOLUTION SUBCUTANEOUS at 23:03

## 2020-08-24 RX ADMIN — EVEROLIMUS 0.5 MILLIGRAM(S): 10 TABLET ORAL at 19:54

## 2020-08-24 RX ADMIN — ATOVAQUONE 1500 MILLIGRAM(S): 750 SUSPENSION ORAL at 09:12

## 2020-08-24 RX ADMIN — Medication 650 MILLIGRAM(S): at 17:23

## 2020-08-24 RX ADMIN — Medication 650 MILLIGRAM(S): at 17:53

## 2020-08-24 RX ADMIN — SODIUM CHLORIDE 3 MILLILITER(S): 9 INJECTION INTRAMUSCULAR; INTRAVENOUS; SUBCUTANEOUS at 05:30

## 2020-08-24 RX ADMIN — PANTOPRAZOLE SODIUM 40 MILLIGRAM(S): 20 TABLET, DELAYED RELEASE ORAL at 09:14

## 2020-08-24 RX ADMIN — Medication 125 MILLIGRAM(S): at 05:34

## 2020-08-24 RX ADMIN — SODIUM CHLORIDE 3 MILLILITER(S): 9 INJECTION INTRAMUSCULAR; INTRAVENOUS; SUBCUTANEOUS at 14:19

## 2020-08-24 NOTE — PROGRESS NOTE ADULT - SUBJECTIVE AND OBJECTIVE BOX
Patient seen and examined at bedside.    Overnight Events: NAEO. This AM, pt reports 2 eps of diarrhea. He is reporting LOBATO and continued LE edema.    Review Of Systems: No chest pain, shortness of breath, or palpitations            Current Meds:  acetaminophen   Tablet .. 650 milliGRAM(s) Oral every 6 hours PRN  atovaquone Suspension 1500 milliGRAM(s) Oral daily  cefTRIAXone   IVPB 2000 milliGRAM(s) IV Intermittent every 24 hours  cholecalciferol 1000 Unit(s) Oral daily  dextrose 5%. 1000 milliLiter(s) IV Continuous <Continuous>  everolimus (ZORTRESS) 0.5 milliGRAM(s) Oral <User Schedule>  folic acid 1 milliGRAM(s) Oral daily  insulin glargine Injectable (LANTUS) 14 Unit(s) SubCutaneous at bedtime  insulin lispro (HumaLOG) corrective regimen sliding scale   SubCutaneous three times a day before meals  insulin lispro Injectable (HumaLOG) 5 Unit(s) SubCutaneous three times a day with meals  magnesium oxide 400 milliGRAM(s) Oral daily  metroNIDAZOLE  IVPB      metroNIDAZOLE  IVPB 500 milliGRAM(s) IV Intermittent every 8 hours  NIFEdipine  milliGRAM(s) Oral <User Schedule>  omega-3-Acid Ethyl Esters 2 Gram(s) Oral two times a day  pantoprazole  Injectable 40 milliGRAM(s) IV Push daily  posaconazole DR Tablet 300 milliGRAM(s) Oral daily  pravastatin 20 milliGRAM(s) Oral at bedtime  predniSONE   Tablet 30 milliGRAM(s) Oral <User Schedule>  sodium chloride 0.9% lock flush 3 milliLiter(s) IV Push every 8 hours  sodium chloride 0.9%. 1000 milliLiter(s) IV Continuous <Continuous>  vancomycin    Solution 500 milliGRAM(s) Oral every 6 hours      Vitals:  T(F): 97.4 (08-24), Max: 99.1 (08-23)  HR: 110 (08-24) (110 - 124)  BP: 99/65 (08-24) (99/65 - 108/68)  RR: 18 (08-24)  SpO2: 97% (08-24)  I&O's Summary    23 Aug 2020 07:01  -  24 Aug 2020 07:00  --------------------------------------------------------  IN: 1095 mL / OUT: 150 mL / NET: 945 mL    24 Aug 2020 07:01  -  24 Aug 2020 14:42  --------------------------------------------------------  IN: 0 mL / OUT: 0 mL / NET: 0 mL        Physical Exam:  Gen: NAD.  HEENT: NCAT.  Neck: No trach.  CV: Normal S1, S2. RRR. Continuous murmur.  Chest: CTAB. No WRR.  Abd: +BSx4. Soft. Distended, but compressible. Nontender.  Ext: 2+ b/l LE edema.  Skin: No cyanosis.                          7.2    7.18  )-----------( 279      ( 24 Aug 2020 09:00 )             24.4     08-24    137  |  100  |  59<H>  ----------------------------<  109<H>  3.6   |  21<L>  |  2.74<H>    Ca    8.7      24 Aug 2020 09:00  Phos  5.3     08-24  Mg     2.8     08-24    TPro  5.8<L>  /  Alb  3.6  /  TBili  0.6  /  DBili  x   /  AST  43<H>  /  ALT  37  /  AlkPhos  59  08-23    TTE: 1. Normal trileaflet aortic valve.  2. Mildly dilated left atrium.  LA volume index = 36 cc/m2.  3. Normal left ventricular internal dimensions and wall  thicknesses.  4. Overall normal left ventricular systolic function. Mild  basal inferior wall hypokinesis, which has been noted on  prior studies.  5. Normal right ventricular size with mildly decreased  systolic function.

## 2020-08-24 NOTE — PROGRESS NOTE ADULT - SUBJECTIVE AND OBJECTIVE BOX
VITAL SIGNS    Telemetry:  sr  110    Vital Signs Last 24 Hrs  T(C): 36.3 (20 @ 13:34), Max: 37.3 (20 @ 19:50)  T(F): 97.4 (20 @ 13:34), Max: 99.1 (20 @ 19:50)  HR: 110 (20 @ 13:34) (110 - 124)  BP: 99/65 (20 @ 13:34) (99/65 - 110/74)  RR: 18 (20 @ 13:34) (18 - 18)  SpO2: 97% (20 @ 13:34) (92% - 97%)                   Daily     Daily Weight in k.1 (24 Aug 2020 08:00)        CAPILLARY BLOOD GLUCOSE      POCT Blood Glucose.: 119 mg/dL (24 Aug 2020 12:06)  POCT Blood Glucose.: 121 mg/dL (24 Aug 2020 08:04)  POCT Blood Glucose.: 145 mg/dL (23 Aug 2020 21:32)  POCT Blood Glucose.: 122 mg/dL (23 Aug 2020 16:58)                          PHYSICAL EXAM  s  "  No chest pain  No SOB"  Neurology: alert and oriented x 3, moves all extremities with no defecits  CV :  RRR  Sternal Wound :  CDI , Stable  healed  Lungs:   CTA B/L  Abdomen: soft, nontender, distended, positive bowel sounds   loose stool x 2  from this am   Extremities:     trace to plus one pedal edema

## 2020-08-24 NOTE — CONSULT NOTE ADULT - ASSESSMENT
A/P: 70y M with history of heart transplant in 2/2018 admitted with autoimmune hemolytic anemia and dark stools requiring transfusion. General Surgery consulted for abdominal distention.     - no acute general surgical intervention at this time  - Patient tolerating diet and passing flatus/stools without difficulty  - thickening of rectal mucosa likely 2/2 c. diff colitis   - surgery will follow   - d/w Dr. Chin and attending     [facility]  [phone]

## 2020-08-24 NOTE — PROGRESS NOTE ADULT - PROBLEM SELECTOR PLAN 3
-Appr hem/onc recs  -Likely 2/2 active infxn  -C/w tx for bacteremia and c. diff, as below  -C/w trend CBC  -Await CMV PCR; GI bx unremarkable. Please ensure former is in lab. -Appr hem/onc recs  -Likely 2/2 active infxn  -C/w tx for bacteremia and c. diff, as below  -C/w trend CBC  -CMV PCR 8/17 negative; please resend

## 2020-08-24 NOTE — PROGRESS NOTE ADULT - PROBLEM SELECTOR PLAN 4
-Pt w/ rising Cr/NORMAN now  -Hold diuretics for now  -Please repeat bladder scan today  -Poss RHC/EMB when sepsis improves -Pt w/ rising Cr/NORMAN now  -off diuretics for now  -Please repeat bladder scan today  -Poss RHC/EMB when sepsis improves  - please c/s cardiorenal

## 2020-08-24 NOTE — PROGRESS NOTE ADULT - ATTENDING COMMENTS
Patient seen and exmained with Dr. Mckoy    I agree with his interval history exam and plans as noted above    Patient immunocompromised post OHTx 2.5 yrs ago, and high dose steroids for hemolytic anemia treatment- developed gram negative bacteremia completing Ceftriaxone but new onset over the past tow days C.diff colitis with worrisome features of dilated loops of bowel, distended clinical abdominal exam, elevated creatinine, older age, and doubling of his WBC in someone who is usually leukopenic    Would monitor with serial abdominal exams and CT guided imaging  Would ask Surgery to consult to be aware of pt.  Will maximize therapy with IV flagyl 500mg IV q 8hr and oral Vancomycin 500mg qid  Discontinue the Ceftriaxone    Gary Lujan MD  [phone]  After 5pm/weekends [phone]

## 2020-08-24 NOTE — PROGRESS NOTE ADULT - PROBLEM SELECTOR PLAN 2
-S/p mult pRBC this admission  -Repeat EGD/push enteroscopy w/ angioectasias/erosions. No e/o continued GIB.  -GI cleared use of ASA, but will hold for now given GIB and lack of strong indication  -Appreciate heme recs, no signs of hemolysis at this time  -Continue oral iron   -Tapering prednisone per hematology: 30 mg daily (started 8/21) followed by 10 mg decrease every 7 days  - Decrease PPI to daily. Discuss with GI when can change to PO.

## 2020-08-24 NOTE — PROGRESS NOTE ADULT - ASSESSMENT
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 He has responded appropriately to a single blood transfusion. Of note, he recently has developed dark colored stools. Will investigate if anemia is due to GI bleeding in setting of steroid use or hemolysis and manage appropriately. Underwent UGI without bleeding source identified  Developed gram negative sepsis requiring transfer to CTU  Clinically improving    #C.diff PCR detected (8/23): severe C.diff colitis  - Overnight 8/22 developed multiple episodes of watery diarrhea  - Abdominal distension noted on exam  - GI- PCR was negative for pathogens  - Abdominal CT(8/23): Rectal wall thickening. New since the previous exam. Correlate clinically for proctitis. Mild distention of the colon proximal to this. No small bowel obstruction.  - WBC=7.18, significant NORMAN (Cr: 1.03 > 2.74), albumin=3.6 (8/23)  - Due to significant NORMAN >50% Cr increase, meets criteria of severe C.diff infection. Will increase PO Vancomycin to 500mg q6h (from 125mg q6h) + IV flagyl  - Please trend daily CBC, CMP (for albumin), fever curve    #Klebsiella oxytoca bacteremia:  Meropenem (8/14-8/17)  Cefepime (8/17-20)  Ceftriaxone (8/20-)  - Growing in blood cultures 2/2 sets on 8/13  - Unclear source  - Repeat blood cultures x2 sets 8/14 no growth final  - tentative plan is to continue ceftriaxone until Monday 8/24 to finish a 10-day course    #OI prophylaxis-  -has been receiving high dose steroids since 5/20  -CMV viral load(8/17): neg  -Valcyte and Bactrim d/c'ed on 8/17 due to leukopenia  -Galactomann<0.5, Fungitell<31,    -c/w Atovaquone 1500mg daily for PCP ppx given on steroid   -Per hemoc, will taper prednisone every 7 days by 10mg    #Pancytopenia- on admission  -Remains with significant leukopenia- especially lymphocyte lines  -COVID PCR neg  -Tick panel neg  -would repeat chest CT scan to see status of prior RUL scarring vs. nodule once patient is stable    #Anemia- r/o GI bleeding    EGD 8/17: esophagitis, acute gastritis s/p biopsy, no ulceration  Consider PPI or H2b    Lobo Mckoy MD, PGY4   ID fellow  Pager: [phone]  After 5pm/weekends call [phone]

## 2020-08-24 NOTE — PROGRESS NOTE ADULT - ASSESSMENT
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 Patient was treated w/ prednisone and Rituximab. Tacro was discontinued and patient was also transitioned to everolimus. Admitted again 6/16-6/19 for hyperglycemia. On 8/11 Reported to transplant team having one done black tarry stool-  instructed to present to [facility] for hemolytic anemia. Patient has been following with Hematology outpatient.  Denies CP  N/V diarrhea SOB. (11 Aug 2020 20:08)  Surgery/Hospital Course:  8/11 Admitted to [facility] with symptomatic anemia  8/13 EGD for investigation of tarry stools  8/14 CTU for SVT and respiratory distress   8/15 SB capsule: stomach & SB lesions, likely ulcers.  8/17 EGD/push enteroscopy w/ angioectasias/erosions in small bowel. Gastropathy and esophagitis. Ulcer seen on VCE most likely scope trauma.    8/18 VSS transferred back to floor.   8/19 VSS, H/H stable 8.8/24.5, would continue to hold asa per transplant team. Follow up CMV PCR and gastric biopsy.   8/20 VS 9.0/28.4 stable Gastric biopsy results LA Grade A esophagitis.  - Acute gastritis. Biopsies taken to r/o CMV, No ulceration seen despite finding on capsule endoscopy - likely 2/2 scope  trauma that has since resolved. Pathology pending.  8/21 VSS H/H  8.1/25.7  trending down will monitor prednisone taper 30 mg today. Procardia 120 initiated today [facility] biopsy Monday.   8/22 VVS; Patient reports loose stools x 2-3 days with 1 episode today.  Stool sent for C-dif and GI PCR. Abdominal X-ray revealed: dilated loops of bowel. Abdomen distended.  Patient denies N/V. GI called to re-evaluate. Continue with current medication regimen.  Awaiting for upcoming cardiac biopsy on Monday 8/24.  8/23   vss    creat up to 2.28 ,  repeating CMP,  TTE ordered  Bladder scan   8/24   cardiac bx cancelled   elev creat  to 2.74   cardio renal cslt called,    + CDIF   inc vanco to 500 q6   ID following

## 2020-08-24 NOTE — PROGRESS NOTE ADULT - SUBJECTIVE AND OBJECTIVE BOX
INFECTIOUS DISEASES FOLLOW UP-- Sujey Tai  [phone]    This is a follow up note for this  70yMale with  Anemia  s/p OHTx 2/18  interval development of abdominal distension and diarrhea- C.diff PCR detected    ROS:  CONSTITUTIONAL:  No fever, good appetite  CARDIOVASCULAR:  No chest pain or palpitations  RESPIRATORY:  No dyspnea  GASTROINTESTINAL:  c/o abdominal distension  GENITOURINARY:  No dysuria  NEUROLOGIC:  No headache,     Allergies  No Known Allergies    ANTIMICROBIALS:    atovaquone Suspension 1500 daily  cefTRIAXone   IVPB 2000 every 24 hours  metroNIDAZOLE  IVPB 500 once  metroNIDAZOLE  IVPB    metroNIDAZOLE  IVPB 500 every 8 hours  posaconazole DR Tablet 300 daily  vancomycin    Solution 125 every 6 hours    OTHER MEDS:  MEDICATIONS  (STANDING):  acetaminophen   Tablet .. 650 every 6 hours PRN  everolimus (ZORTRESS) 0.5 <User Schedule>  insulin glargine Injectable (LANTUS) 14 at bedtime  insulin lispro (HumaLOG) corrective regimen sliding scale  three times a day before meals  insulin lispro Injectable (HumaLOG) 5 three times a day with meals  NIFEdipine  <User Schedule>  pantoprazole  Injectable 40 daily  pravastatin 20 at bedtime  predniSONE   Tablet 30 <User Schedule>    Vital Signs Last 24 Hrs  T(F): 98.9 (08-24-20 @ 04:40), Max: 99.1 (08-23-20 @ 19:50)    Vital Signs Last 24 Hrs  HR: 120 (08-24-20 @ 09:29) (114 - 124)  BP: 102/66 (08-24-20 @ 09:29) (102/65 - 110/74)  RR: 18 (08-24-20 @ 04:40)  SpO2: 95% (08-24-20 @ 04:40) (92% - 97%)  Wt(kg): --    PHYSICAL EXAM:  Constitutional:no acute distress, but lying in bed instead of the chair  Eyes:WALT, EOMI  Ear/Nose/Throat: no oral lesions, 	  Respiratory: clear BL  Cardiovascular: S1S2 sternotomy healed  Gastrointestinal: distended,not overly tender on exam  Extremities:no e/e/c  No Lymphadenopathy  IV sites not inflammed.    Labs:                        7.2    7.18  )-----------( 279      ( 24 Aug 2020 09:00 )             24.4     08-24    137  |  100  |  59<H>  ----------------------------<  109<H>  3.6   |  21<L>  |  2.74<H>    Ca    8.7      24 Aug 2020 09:00  Phos  5.3     08-24  Mg     2.8     08-24    TPro  5.8<L>  /  Alb  3.6  /  TBili  0.6  /  DBili  x   /  AST  43<H>  /  ALT  37  /  AlkPhos  59  08-23      WBC Trend:  WBC Count: 7.18 (08-24-20 @ 09:00)  WBC Count: 5.47 (08-23-20 @ 06:08)  WBC Count: 4.70 (08-22-20 @ 08:54)  WBC Count: 3.97 (08-21-20 @ 08:17)      Creatine Trend:  Creatinine, Serum: 2.74 (08-24)  Creatinine, Serum: 2.31 (08-23)  Creatinine, Serum: 2.28 (08-23)  Creatinine, Serum: 1.37 (08-22)      Liver Biochemical Testing Trend:  Alanine Aminotransferase (ALT/SGPT): 37 (08-23)  Aspartate Aminotransferase (AST/SGOT): 43 (08-23-20 @ 10:51)  Bilirubin Total, Serum: 0.6 (08-23)      Trend LDH  06-17-20 @ 08:51  376<H>  06-17-20 @ 08:50  390<H>  06-02-20 @ 07:26  323<H>  06-01-20 @ 07:16  266<H>  05-31-20 @ 07:53  246<H>  05-30-20 @ 07:39  247<H>  05-29-20 @ 07:42  223  05-28-20 @ 07:45  216  05-27-20 @ 07:15  237  05-26-20 @ 08:29  256<H>  05-23-20 @ 11:15  253<H>  05-23-20 @ 07:47  258<H>      MICROBIOLOGY:    C Diff by PCR Result: Detected (08-23 @ 01:54)    C. difficile GDH &amp; toxins A/B by EIA (08.22.20 @ 18:22)    Clostridium difficile GDH Toxins A&amp;B, EIA:   Indeterminate    Clostridium difficile GDH Interpretation: This specimen is positive for C. Difficile glutamate dehydrogenase (GDH)  antigen and negative for C. Difficile Toxins A & B, by EIA.  GDH is a  highly sensitive screening marker for C. Difficile that is produced in  large amounts by all C. Difficilestrains, both toxigenic and  nontoxigenic.  Specimens that are GDH positive and toxin negative will be  tested further by PCR to detect toxin gene sequences associated with  toxin producing C. Difficle.    GI PCR Panel, Stool (collected 23 Aug 2020 02:01)  Source: .Stool Feces  Final Report:    GI PCR Results: NOT detected    Culture - Urine (collected 14 Aug 2020 13:32)  Source: .Urine Clean Catch (Midstream)  Final Report:    No growth    Culture - Blood (collected 14 Aug 2020 01:32)  Source: .Blood Blood  Final Report:    No Growth Final    Culture - Blood (collected 14 Aug 2020 01:32)  Source: .Blood Blood  Final Report:    No Growth Final    Culture - Blood (collected 13 Aug 2020 14:48)  Source: .Blood Blood-Peripheral  Final Report:    Growth in aerobic and anaerobic bottles: Klebsiella oxytoca/Raoutella    ornithinolytica    See previous culture [id number]    Culture - Blood (collected 13 Aug 2020 12:14)  Source: .Blood Blood-Peripheral  Final Report:    Growth in anaerobic bottle: Klebsiella oxytoca/Raoutella ornithinolytica    "Due to technical problems, Proteus sp. will Not be reported as part of    the BCID panel until further notice"    ***Blood Panel PCR results on this specimen are available    approximately 3 hours after the Gram stain result.***    Gram stain, PCR, and/or culture results may not always    correspond due to difference in methodologies.    ************************************************************    This PCR assay was performed using[employer].    The following targets are tested for: Enterococcus,    vancomycin resistant enterococci, Listeria monocytogenes,    coagulase negative staphylococci, S. aureus,    methicillin resistant S. aureus, Streptococcus agalactiae    (Group B), S.pneumoniae, S. pyogenes (Group A),    Acinetobacter baumannii, Enterobacter cloacae, E. coli,    Klebsiella oxytoca, K. pneumoniae, Proteus sp.,    Serratia marcescens, Haemophilus influenzae,    Neisseria meningitidis, Pseudomonas aeruginosa, Candida    albicans, C. glabrata, C krusei, C parapsilosis,    C. tropicalis and the KPC resistance gene.  Organism: Blood Culture PCR  Klebsiella oxytoca /Raoutella ornithinolytica  Organism: Klebsiella oxytoca /Raoutella ornithinolytica    Sensitivities:      -  Amikacin: S <=16      -  Ampicillin: R >16 These ampicillin results predict results for amoxicillin      -  Ampicillin/Sulbactam: I 16/8 Enterobacter, Citrobacter, and Serratia may develop resistance during prolonged therapy (3-4 days)      -  Aztreonam: S <=4      -  Cefazolin: R >16 Enterobacter, Citrobacter, and Serratia may develop resistance during prolonged therapy (3-4 days)      -  Cefepime: S <=2      -  Cefoxitin: S <=8      -  Ceftriaxone: S <=1 Enterobacter, Citrobacter, and Serratia may develop resistance during prolonged therapy      -  Ciprofloxacin: S <=0.25      -  Ertapenem: S <=0.5      -  Gentamicin: S <=2      -  Imipenem: S <=1      -  Levofloxacin: S <=0.5      -  Meropenem: S <=1      -  Piperacillin/Tazobactam: S <=8      -  Tobramycin: S <=2      -  Trimethoprim/Sulfamethoxazole: R >2/38      Method Type: KAMILA  Organism: Blood Culture PCR    Sensitivities:      -  Klebsiella oxytoca: Detec      Method Type: PCR    Culture - Urine (collected 13 Aug 2020 00:40)  Source: .Urine Clean Catch (Midstream)  Final Report:    >=3 organisms. Probable collection contamination.    Culture - Blood (collected 17 Jun 2020 16:59)  Source: .Blood Blood-Peripheral  Final Report:    No Growth Final    Culture - Blood (collected 17 Jun 2020 14:19)  Source: .Blood Blood-Peripheral  Final Report:    No Growth Final    Babesia microti PCR, Blood. (collected 27 May 2020 10:16)  Source: .Blood  Final Report:    Babesia microti PCR    Results: NOT detected    ABS CD4: 120 /uL (08-17-18 @ 13:00)    HIV-1 RNA Quantitative, Viral Load:   NOT DET. (05-31-18 @ 19:10)  HIV-1 Viral Load Result: NOT DET. (05-31-18 @ 19:10)  HIV-1 RNA Quantitative, Viral Load:   NOT DET. (02-02-18 @ 19:25)  HIV-1 Viral Load Result: NOT DET. (02-02-18 @ 19:25)    OTHER TESTS:  COVID-19 PCR: NotDetec (08-18-20 @ 13:14)  COVID-19 PCR: NotDetec (08-11-20 @ 20:08)      RADIOLOGY & ADDITIONAL STUDIES:    < from: CT Abdomen and Pelvis w/ Oral Cont (08.23.20 @ 13:30) >  IMPRESSION:  Right lower lobe consolidation with air bronchograms unchanged since 8/14/2020. This could represent atelectasis and/or infection. Correlate clinically. Previously described patchy opacities in the left lower lobe and lingula have resolved. Trace right pleural effusion.Small right pleural effusion.    Rectal wall thickening. New since the previous exam. Correlate clinically for proctitis. Mild distention of the colon proximal to this. No small bowel obstruction.    < end of copied text > INFECTIOUS DISEASES FOLLOW UP-- Sujey Lujan  [phone]    This is a follow up note for this  69 yo Male with  Anemia  s/p OHTx 2/18  interval development of abdominal distension and diarrhea- C.diff PCR detected    Interval event:  - Has watery diarrhea this morning  - Abd still very distended  - No fever or chills  - Told team to increase PO vanc to 500mg q6h    ROS:  CONSTITUTIONAL:  No fever, good appetite  CARDIOVASCULAR:  No chest pain or palpitations  RESPIRATORY:  No dyspnea  GASTROINTESTINAL:  c/o abdominal distension  GENITOURINARY:  No dysuria  NEUROLOGIC:  No headache,     Allergies  No Known Allergies    ANTIMICROBIALS:    atovaquone Suspension 1500 daily  cefTRIAXone   IVPB 2000 every 24 hours  metroNIDAZOLE  IVPB 500 once  metroNIDAZOLE  IVPB    metroNIDAZOLE  IVPB 500 every 8 hours  posaconazole DR Tablet 300 daily  vancomycin    Solution 125 every 6 hours    OTHER MEDS:  MEDICATIONS  (STANDING):  acetaminophen   Tablet .. 650 every 6 hours PRN  everolimus (ZORTRESS) 0.5 <User Schedule>  insulin glargine Injectable (LANTUS) 14 at bedtime  insulin lispro (HumaLOG) corrective regimen sliding scale  three times a day before meals  insulin lispro Injectable (HumaLOG) 5 three times a day with meals  NIFEdipine  <User Schedule>  pantoprazole  Injectable 40 daily  pravastatin 20 at bedtime  predniSONE   Tablet 30 <User Schedule>    Vital Signs Last 24 Hrs  T(F): 98.9 (08-24-20 @ 04:40), Max: 99.1 (08-23-20 @ 19:50)    Vital Signs Last 24 Hrs  HR: 120 (08-24-20 @ 09:29) (114 - 124)  BP: 102/66 (08-24-20 @ 09:29) (102/65 - 110/74)  RR: 18 (08-24-20 @ 04:40)  SpO2: 95% (08-24-20 @ 04:40) (92% - 97%)  Wt(kg): --    PHYSICAL EXAM:  Constitutional: no acute distress, but lying in bed instead of the chair  Eyes: WALT, EOMI  Ear/Nose/Throat: no oral lesions, 	  Respiratory: clear BL  Cardiovascular: S1S2 sternotomy healed  Gastrointestinal: hyperactive BS, very distended abd, not overly tender on exam  Extremities:no e/e/c  No Lymphadenopathy  IV sites not inflammed.    Labs:                        7.2    7.18  )-----------( 279      ( 24 Aug 2020 09:00 )             24.4     08-24    137  |  100  |  59<H>  ----------------------------<  109<H>  3.6   |  21<L>  |  2.74<H>    Ca    8.7      24 Aug 2020 09:00  Phos  5.3     08-24  Mg     2.8     08-24    TPro  5.8<L>  /  Alb  3.6  /  TBili  0.6  /  DBili  x   /  AST  43<H>  /  ALT  37  /  AlkPhos  59  08-23      WBC Trend:  WBC Count: 7.18 (08-24-20 @ 09:00)  WBC Count: 5.47 (08-23-20 @ 06:08)  WBC Count: 4.70 (08-22-20 @ 08:54)  WBC Count: 3.97 (08-21-20 @ 08:17)      Creatine Trend:  Creatinine, Serum: 2.74 (08-24)  Creatinine, Serum: 2.31 (08-23)  Creatinine, Serum: 2.28 (08-23)  Creatinine, Serum: 1.37 (08-22)      Liver Biochemical Testing Trend:  Alanine Aminotransferase (ALT/SGPT): 37 (08-23)  Aspartate Aminotransferase (AST/SGOT): 43 (08-23-20 @ 10:51)  Bilirubin Total, Serum: 0.6 (08-23)      Trend LDH  06-17-20 @ 08:51  376<H>  06-17-20 @ 08:50  390<H>  06-02-20 @ 07:26  323<H>  06-01-20 @ 07:16  266<H>  05-31-20 @ 07:53  246<H>  05-30-20 @ 07:39  247<H>  05-29-20 @ 07:42  223  05-28-20 @ 07:45  216  05-27-20 @ 07:15  237  05-26-20 @ 08:29  256<H>  05-23-20 @ 11:15  253<H>  05-23-20 @ 07:47  258<H>      MICROBIOLOGY:    C Diff by PCR Result: Detected (08-23 @ 01:54)    C. difficile GDH &amp; toxins A/B by EIA (08.22.20 @ 18:22)    Clostridium difficile GDH Toxins A&amp;B, EIA:   Indeterminate    Clostridium difficile GDH Interpretation: This specimen is positive for C. Difficile glutamate dehydrogenase (GDH)  antigen and negative for C. Difficile Toxins A & B, by EIA.  GDH is a  highly sensitive screening marker for C. Difficile that is produced in  large amounts by all C. Difficilestrains, both toxigenic and  nontoxigenic.  Specimens that are GDH positive and toxin negative will be  tested further by PCR to detect toxin gene sequences associated with  toxin producing C. Difficle.    GI PCR Panel, Stool (collected 23 Aug 2020 02:01)  Source: .Stool Feces  Final Report:    GI PCR Results: NOT detected    Culture - Urine (collected 14 Aug 2020 13:32)  Source: .Urine Clean Catch (Midstream)  Final Report:    No growth    Culture - Blood (collected 14 Aug 2020 01:32)  Source: .Blood Blood  Final Report:    No Growth Final    Culture - Blood (collected 14 Aug 2020 01:32)  Source: .Blood Blood  Final Report:    No Growth Final    Culture - Blood (collected 13 Aug 2020 14:48)  Source: .Blood Blood-Peripheral  Final Report:    Growth in aerobic and anaerobic bottles: Klebsiella oxytoca/Raoutella    ornithinolytica    See previous culture [id number]    Culture - Blood (collected 13 Aug 2020 12:14)  Source: .Blood Blood-Peripheral  Final Report:    Growth in anaerobic bottle: Klebsiella oxytoca/Raoutella ornithinolytica    "Due to technical problems, Proteus sp. will Not be reported as part of    the BCID panel until further notice"    ***Blood Panel PCR results on this specimen are available    approximately 3 hours after the Gram stain result.***    Gram stain, PCR, and/or culture results may not always    correspond due to difference in methodologies.    ************************************************************    This PCR assay was performed using[employer].    The following targets are tested for: Enterococcus,    vancomycin resistant enterococci, Listeria monocytogenes,    coagulase negative staphylococci, S. aureus,    methicillin resistant S. aureus, Streptococcus agalactiae    (Group B), S.pneumoniae, S. pyogenes (Group A),    Acinetobacter baumannii, Enterobacter cloacae, E. coli,    Klebsiella oxytoca, K. pneumoniae, Proteus sp.,    Serratia marcescens, Haemophilus influenzae,    Neisseria meningitidis, Pseudomonas aeruginosa, Candida    albicans, C. glabrata, C krusei, C parapsilosis,    C. tropicalis and the KPC resistance gene.  Organism: Blood Culture PCR  Klebsiella oxytoca /Raoutella ornithinolytica  Organism: Klebsiella oxytoca /Raoutella ornithinolytica    Sensitivities:      -  Amikacin: S <=16      -  Ampicillin: R >16 These ampicillin results predict results for amoxicillin      -  Ampicillin/Sulbactam: I 16/8 Enterobacter, Citrobacter, and Serratia may develop resistance during prolonged therapy (3-4 days)      -  Aztreonam: S <=4      -  Cefazolin: R >16 Enterobacter, Citrobacter, and Serratia may develop resistance during prolonged therapy (3-4 days)      -  Cefepime: S <=2      -  Cefoxitin: S <=8      -  Ceftriaxone: S <=1 Enterobacter, Citrobacter, and Serratia may develop resistance during prolonged therapy      -  Ciprofloxacin: S <=0.25      -  Ertapenem: S <=0.5      -  Gentamicin: S <=2      -  Imipenem: S <=1      -  Levofloxacin: S <=0.5      -  Meropenem: S <=1      -  Piperacillin/Tazobactam: S <=8      -  Tobramycin: S <=2      -  Trimethoprim/Sulfamethoxazole: R >2/38      Method Type: KAMILA  Organism: Blood Culture PCR    Sensitivities:      -  Klebsiella oxytoca: Detec      Method Type: PCR    Culture - Urine (collected 13 Aug 2020 00:40)  Source: .Urine Clean Catch (Midstream)  Final Report:    >=3 organisms. Probable collection contamination.    Culture - Blood (collected 17 Jun 2020 16:59)  Source: .Blood Blood-Peripheral  Final Report:    No Growth Final    Culture - Blood (collected 17 Jun 2020 14:19)  Source: .Blood Blood-Peripheral  Final Report:    No Growth Final    Babesia microti PCR, Blood. (collected 27 May 2020 10:16)  Source: .Blood  Final Report:    Babesia microti PCR    Results: NOT detected    ABS CD4: 120 /uL (08-17-18 @ 13:00)    HIV-1 RNA Quantitative, Viral Load:   NOT DET. (05-31-18 @ 19:10)  HIV-1 Viral Load Result: NOT DET. (05-31-18 @ 19:10)  HIV-1 RNA Quantitative, Viral Load:   NOT DET. (02-02-18 @ 19:25)  HIV-1 Viral Load Result: NOT DET. (02-02-18 @ 19:25)    OTHER TESTS:  COVID-19 PCR: NotDetec (08-18-20 @ 13:14)  COVID-19 PCR: NotDetec (08-11-20 @ 20:08)      RADIOLOGY & ADDITIONAL STUDIES:    < from: CT Abdomen and Pelvis w/ Oral Cont (08.23.20 @ 13:30) >  IMPRESSION:  Right lower lobe consolidation with air bronchograms unchanged since 8/14/2020. This could represent atelectasis and/or infection. Correlate clinically. Previously described patchy opacities in the left lower lobe and lingula have resolved. Trace right pleural effusion.Small right pleural effusion.    Rectal wall thickening. New since the previous exam. Correlate clinically for proctitis. Mild distention of the colon proximal to this. No small bowel obstruction.    < end of copied text >

## 2020-08-24 NOTE — PROGRESS NOTE ADULT - ASSESSMENT
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 He has received a total of 3 units PRBC this admission with slow downtrend of hemoglobin. Labs were not consistent with hemolysis and he reported dark stools for which EGD/enteroscopy eventually revealed chronic gastritis and small bowel ectasias. He developed acute respiratory distress and profound sinus tachycardia on 8/13 in setting of Klebsiella bacteremia of unclear origin (preceded EGD) for which CT performed which suggests possible pneumonia. He has clinically improved with antibiotics, however he was found to also have Cdiff infxn and is currently on tx w/ Vanc/Flagyl.    Review of pertinent studies  8/2020 labs: Direct Jacky +, 4.6% reticulocytes with low RI, normal bilirubin,  (stable). Haptoglobin normal. 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 He has received a total of 3 units PRBC this admission with slow downtrend of hemoglobin. Labs were not consistent with hemolysis and he reported dark stools for which EGD/enteroscopy eventually revealed chronic gastritis and small bowel ectasias. He developed acute respiratory distress and profound sinus tachycardia on 8/13 in setting of Klebsiella bacteremia of unclear origin (preceded EGD) for which CT performed which suggests possible pneumonia. He has clinically improved with antibiotics, however he was found to also have Cdiff infxn and was started on vanc PO. Also notably has anasarca with RUE swelling and b/l LE swelling.     Review of pertinent studies  8/2020 labs: Direct Jacky +, 4.6% reticulocytes with low RI, normal bilirubin,  (stable). Haptoglobin normal.

## 2020-08-24 NOTE — PROGRESS NOTE ADULT - PROBLEM SELECTOR PLAN 7
-Appr ID input  -Cdiff pos  -C/w vanc PO and metronidazole  -Contact precautions  -Would make gen sx aware of pt; pt w/ diffuse distension now -Appr ID input  -Cdiff PCR positive  -C/w vanc PO and metronidazole  -Contact precautions  -Would make gen sx aware of pt; pt w/ diffuse distension now

## 2020-08-24 NOTE — PROGRESS NOTE ADULT - PROBLEM SELECTOR PLAN 2
Abdominal X-ray revealed dilated loops of bowel   GI called    CT scan  _ rectal thickening   C-dif   now on vanco Abdominal X-ray revealed dilated loops of bowel   GI called    CT scan  _ rectal thickening   C-dif   now on vanco   500 q6    ID following

## 2020-08-24 NOTE — PROGRESS NOTE ADULT - PROBLEM SELECTOR PLAN 1
-C/w statin for TCAD ppx  -ASA on hold given GIB  -C/w everolimus 0.5mg PO BID; goal 8-10.  -Steroid taper as written   -C/w posiconazole for ppx; Bactrim and Valcyte on hold given leukopenia  -Off Cellcept while on high dose steroids and now Cdiff infxn  -Will defer RHC/EMB until resolution of sepsis -C/w statin for TCAD ppx  -ASA on hold given GIB  -C/w everolimus 0.5mg PO BID; goal 8-10.  -Steroid taper as written   -C/w posiconazole for ppx; Bactrim and Valcyte on hold given leukopenia  -Off Cellcept while on high dose steroids and now Cdiff infxn  -defer RHC/EMB until resolution of sepsis; may require sooner given NORMAN and volume overload state

## 2020-08-24 NOTE — PROGRESS NOTE ADULT - PROBLEM SELECTOR PLAN 1
Heart Transplant Team Following: f/u recs   -C/w everolimus .5   BID  -C/w Prednisone taper   -Off Cellcept while on high dose steroids. Will eventually need to start Cellcept as steroids weaned.

## 2020-08-24 NOTE — CONSULT NOTE ADULT - SUBJECTIVE AND OBJECTIVE BOX
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 Patient was treated w/ prednisone and Rituximab. Tacro was discontinued and patient was also transitioned to everolimus  Admitted  6/16-6/19  for hyperglycemia.  Reported to transplant team having one done black tarry stool-  instructed to  present to [facility] for hemolytic anemia. Patient has been following with Hematology outpatient.  Denies CP  N/V diarrhea SOB. (11 Aug 2020 20:08)    Interval Events: Primary team concerned over abdominal distention, Patient with C. Diff. Patient denies N/V Denies difficulty moving bowels. Denies acute changes in size of his abdomen. Denies abdominal pain.     PAST MEDICAL & SURGICAL HISTORY:  H/O hemolytic anemia  H/O autoimmune hemolytic anemia  Knee pain, right  HLD (hyperlipidemia)  Former smoker  DVT of upper extremity (deep vein thrombosis)  Hepatitis C virus  GIB (gastrointestinal bleeding)  Ventricular fibrillation: s/p AICD  PAF (paroxysmal atrial fibrillation): on xarelto  Non-Ischemic Cardiomyopathy  SVT (Supraventricular Tachycardia)  HTN  CHF (Congestive Heart Failure)  S/P right heart catheterization: biopsy multiple  H/O heart transplant: 2/2018  Status post left hip replacement  History of Prior Ablation Treatment: for afib  AICD (Automatic Cardioverter/Defibrillator) Present: [employer] with 1 [employer] lead4/1/09- explanted and replaced with [employer] 2 leads on 9/2/09    OBJECTIVE:    T(C): 36.3 (08-24-20 @ 13:34), Max: 37.3 (08-23-20 @ 19:50)  T(F): 97.4 (08-24-20 @ 13:34), Max: 99.1 (08-23-20 @ 19:50)  HR: 110 (08-24-20 @ 13:34) (110 - 124)  BP: 99/65 (08-24-20 @ 13:34) (99/65 - 108/68)  RR: 18 (08-24-20 @ 13:34) (18 - 18)  SpO2: 97% (08-24-20 @ 13:34) (92% - 97%)      23 Aug 2020 07:01  -  24 Aug 2020 07:00  --------------------------------------------------------  IN:    Oral Fluid: 1020 mL    sodium chloride 0.9%.: 75 mL  Total IN: 1095 mL    OUT:    Voided: 150 mL  Total OUT: 150 mL    Total NET: 945 mL      24 Aug 2020 07:01  -  24 Aug 2020 16:27  --------------------------------------------------------  IN:  Total IN: 0 mL    OUT:  Total OUT: 0 mL    Total NET: 0 mL    PHYSICAL EXAM:    GENERAL: NAD, lying in bed comfortably  HEAD:  Atraumatic, Normocephalic  ENT: Moist mucous membranes  CHEST/LUNG: Unlabored respirations  ABDOMEN: Soft, distended, slightly tender in LLQ  NERVOUS SYSTEM:  Alert & Oriented X3, speech clear.   SKIN: No rashes or lesions                 7.2    7.18   )----------(  279       ( 24 Aug 2020 09:00 )               24.4      137    |  100    |  59     ----------------------------<  109        ( 24 Aug 2020 09:00 )  3.6     |  21     |  2.74     Ca    8.7        ( 24 Aug 2020 09:00 )  Phos  5.3       ( 24 Aug 2020 09:00 )  Mg     2.8       ( 24 Aug 2020 09:00 )          CAPILLARY BLOOD GLUCOSE      < from: CT Abdomen and Pelvis w/ Oral Cont (08.23.20 @ 13:30) >  FINDINGS:    Of note, streak artifact from left hip prosthesis limits lower abdominal evaluation.    LOWER CHEST: Trace right lower lobe consolidation with air bronchograms unchanged since 8/14/2020. This could represent infection. Correlate clinically. Previously described patchy opacities in the left lower lobe and lingula have resolved. Small right pleural effusion.    LIVER: Within normal limits.  BILE DUCTS: Normal caliber.  GALLBLADDER: Cholelithiasis.  SPLEEN: Within normal limits.  PANCREAS: Within normal limits.  ADRENALS: 8 mm unchanged left adrenal adenoma. The right adrenal gland is unremarkable.  KIDNEYS/URETERS: There is a 4 cm simple renal cysts at the lower pole of the left kidney.. The right kidney is unremarkable.    BLADDER: Within normal limits.  REPRODUCTIVE ORGANS: Prostate within normal limits.    BOWEL: Mild air distention of the colon. No small bowel obstruction. Rectal wall thickening. Correlate clinically for proctitis. No bowel obstruction. Appendix is not visualized. No evidence of inflammation in the pericecal region.  PERITONEUM: No ascites.  VESSELS: Atherosclerotic changes.  RETROPERITONEUM/LYMPH NODES: No lymphadenopathy.  ABDOMINAL WALL: Tiny fat-containing periumbilical hernia.  BONES: Left hip prosthesis. Multilevel degenerative change,with subchondral sclerosis particularly predominant on the L2-L3. Mild multilevel vertebral body height loss stable. Sternotomy.    IMPRESSION:  Right lower lobe consolidation with air bronchograms unchanged since 8/14/2020. This could represent atelectasis and/or infection. Correlate clinically. Previously described patchy opacities in the left lower lobe and lingula have resolved. Trace right pleural effusion.Small right pleural effusion.    Rectal wall thickening. New since the previous exam. Correlate clinically for proctitis. Mild distention of the colon proximal to this. No small bowel obstruction.      IRENE MCMULLEN M.D., RADIOLOGY RESIDENT  This document has been electronically signed.  MATEUSZ BAUM M.D., ATTENDING RADIOLOGIST  This document has been electronically signed. Aug 23 2020  2:07PM    < end of copied text >

## 2020-08-24 NOTE — PROGRESS NOTE ADULT - PROBLEM SELECTOR PLAN 3
- Stable H&H,   -Appreciate GI recs; repeat EGD/push enteroscopy- gastritis with no active bleeding seen. F/U gastric biopsy r/o CMV CARDIO RENAL CSLT CALLED today  Hold all nephrotoxin agents

## 2020-08-25 ENCOUNTER — RESULT REVIEW (OUTPATIENT)
Age: 70
End: 2020-08-25

## 2020-08-25 DIAGNOSIS — N17.9 ACUTE KIDNEY FAILURE, UNSPECIFIED: ICD-10-CM

## 2020-08-25 DIAGNOSIS — E87.2 ACIDOSIS: ICD-10-CM

## 2020-08-25 LAB
ALBUMIN SERPL ELPH-MCNC: 3.4 G/DL — SIGNIFICANT CHANGE UP (ref 3.3–5)
ALBUMIN SERPL ELPH-MCNC: 3.4 G/DL — SIGNIFICANT CHANGE UP (ref 3.3–5)
ALP SERPL-CCNC: 61 U/L — SIGNIFICANT CHANGE UP (ref 40–120)
ALP SERPL-CCNC: 68 U/L — SIGNIFICANT CHANGE UP (ref 40–120)
ALT FLD-CCNC: 26 U/L — SIGNIFICANT CHANGE UP (ref 10–45)
ALT FLD-CCNC: 29 U/L — SIGNIFICANT CHANGE UP (ref 10–45)
ANION GAP SERPL CALC-SCNC: 18 MMOL/L — HIGH (ref 5–17)
ANION GAP SERPL CALC-SCNC: 18 MMOL/L — HIGH (ref 5–17)
APPEARANCE UR: ABNORMAL
APTT BLD: 25.8 SEC — LOW (ref 27.5–35.5)
AST SERPL-CCNC: 40 U/L — SIGNIFICANT CHANGE UP (ref 10–40)
AST SERPL-CCNC: 40 U/L — SIGNIFICANT CHANGE UP (ref 10–40)
BACTERIA # UR AUTO: NEGATIVE — SIGNIFICANT CHANGE UP
BASE EXCESS BLDV CALC-SCNC: -1.3 MMOL/L — SIGNIFICANT CHANGE UP (ref -2–2)
BILIRUB SERPL-MCNC: 0.6 MG/DL — SIGNIFICANT CHANGE UP (ref 0.2–1.2)
BILIRUB SERPL-MCNC: 0.8 MG/DL — SIGNIFICANT CHANGE UP (ref 0.2–1.2)
BILIRUB UR-MCNC: NEGATIVE — SIGNIFICANT CHANGE UP
BLD GP AB SCN SERPL QL: NEGATIVE — SIGNIFICANT CHANGE UP
BUN SERPL-MCNC: 68 MG/DL — HIGH (ref 7–23)
BUN SERPL-MCNC: 70 MG/DL — HIGH (ref 7–23)
CA-I SERPL-SCNC: 1.04 MMOL/L — LOW (ref 1.12–1.3)
CALCIUM SERPL-MCNC: 8.1 MG/DL — LOW (ref 8.4–10.5)
CALCIUM SERPL-MCNC: 8.3 MG/DL — LOW (ref 8.4–10.5)
CHLORIDE BLDV-SCNC: 103 MMOL/L — SIGNIFICANT CHANGE UP (ref 96–108)
CHLORIDE SERPL-SCNC: 97 MMOL/L — SIGNIFICANT CHANGE UP (ref 96–108)
CHLORIDE SERPL-SCNC: 97 MMOL/L — SIGNIFICANT CHANGE UP (ref 96–108)
CO2 BLDV-SCNC: 24 MMOL/L — SIGNIFICANT CHANGE UP (ref 22–30)
CO2 SERPL-SCNC: 18 MMOL/L — LOW (ref 22–31)
CO2 SERPL-SCNC: 20 MMOL/L — LOW (ref 22–31)
COLOR SPEC: YELLOW — SIGNIFICANT CHANGE UP
CREAT SERPL-MCNC: 2.92 MG/DL — HIGH (ref 0.5–1.3)
CREAT SERPL-MCNC: 3.08 MG/DL — HIGH (ref 0.5–1.3)
DIFF PNL FLD: NEGATIVE — SIGNIFICANT CHANGE UP
EPI CELLS # UR: 2 /HPF — SIGNIFICANT CHANGE UP (ref 0–5)
GAS PNL BLDA: SIGNIFICANT CHANGE UP
GAS PNL BLDV: 132 MMOL/L — LOW (ref 135–145)
GAS PNL BLDV: SIGNIFICANT CHANGE UP
GLUCOSE BLDC GLUCOMTR-MCNC: 107 MG/DL — HIGH (ref 70–99)
GLUCOSE BLDC GLUCOMTR-MCNC: 73 MG/DL — SIGNIFICANT CHANGE UP (ref 70–99)
GLUCOSE BLDC GLUCOMTR-MCNC: 80 MG/DL — SIGNIFICANT CHANGE UP (ref 70–99)
GLUCOSE BLDV-MCNC: 76 MG/DL — SIGNIFICANT CHANGE UP (ref 70–99)
GLUCOSE SERPL-MCNC: 72 MG/DL — SIGNIFICANT CHANGE UP (ref 70–99)
GLUCOSE SERPL-MCNC: 80 MG/DL — SIGNIFICANT CHANGE UP (ref 70–99)
GLUCOSE UR QL: NEGATIVE — SIGNIFICANT CHANGE UP
HCO3 BLDV-SCNC: 23 MMOL/L — SIGNIFICANT CHANGE UP (ref 21–29)
HCT VFR BLD CALC: 22 % — LOW (ref 39–50)
HCT VFR BLDA CALC: 22 % — CRITICAL LOW (ref 39–50)
HGB BLD CALC-MCNC: 7.2 G/DL — LOW (ref 13–17)
HGB BLD-MCNC: 6.9 G/DL — CRITICAL LOW (ref 13–17)
HOROWITZ INDEX BLDV+IHG-RTO: 21 — SIGNIFICANT CHANGE UP
HYALINE CASTS # UR AUTO: 2 /LPF — SIGNIFICANT CHANGE UP (ref 0–7)
INR BLD: 1.01 RATIO — SIGNIFICANT CHANGE UP (ref 0.88–1.16)
KETONES UR-MCNC: NEGATIVE — SIGNIFICANT CHANGE UP
LACTATE BLDV-MCNC: 1.1 MMOL/L — SIGNIFICANT CHANGE UP (ref 0.7–2)
LEUKOCYTE ESTERASE UR-ACNC: NEGATIVE — SIGNIFICANT CHANGE UP
MAGNESIUM SERPL-MCNC: 3 MG/DL — HIGH (ref 1.6–2.6)
MAGNESIUM SERPL-MCNC: 3 MG/DL — HIGH (ref 1.6–2.6)
MCHC RBC-ENTMCNC: 30.8 PG — SIGNIFICANT CHANGE UP (ref 27–34)
MCHC RBC-ENTMCNC: 31.4 GM/DL — LOW (ref 32–36)
MCV RBC AUTO: 98.2 FL — SIGNIFICANT CHANGE UP (ref 80–100)
NITRITE UR-MCNC: NEGATIVE — SIGNIFICANT CHANGE UP
NRBC # BLD: 0 /100 WBCS — SIGNIFICANT CHANGE UP (ref 0–0)
OTHER CELLS CSF MANUAL: 6 ML/DL — LOW (ref 18–23)
PCO2 BLDV: 37 MMHG — SIGNIFICANT CHANGE UP (ref 35–50)
PH BLDV: 7.41 — SIGNIFICANT CHANGE UP (ref 7.35–7.45)
PH UR: 5.5 — SIGNIFICANT CHANGE UP (ref 5–8)
PHOSPHATE SERPL-MCNC: 6.9 MG/DL — HIGH (ref 2.5–4.5)
PLATELET # BLD AUTO: 282 K/UL — SIGNIFICANT CHANGE UP (ref 150–400)
PLATELET # BLD AUTO: SIGNIFICANT CHANGE UP (ref 150–400)
PO2 BLDV: 36 MMHG — SIGNIFICANT CHANGE UP (ref 25–45)
POTASSIUM BLDV-SCNC: 3.4 MMOL/L — LOW (ref 3.5–5.3)
POTASSIUM SERPL-MCNC: 3.4 MMOL/L — LOW (ref 3.5–5.3)
POTASSIUM SERPL-MCNC: 3.8 MMOL/L — SIGNIFICANT CHANGE UP (ref 3.5–5.3)
POTASSIUM SERPL-SCNC: 3.4 MMOL/L — LOW (ref 3.5–5.3)
POTASSIUM SERPL-SCNC: 3.8 MMOL/L — SIGNIFICANT CHANGE UP (ref 3.5–5.3)
PREALB SERPL-MCNC: 24 MG/DL — SIGNIFICANT CHANGE UP (ref 20–40)
PROT SERPL-MCNC: 5.6 G/DL — LOW (ref 6–8.3)
PROT SERPL-MCNC: 6.1 G/DL — SIGNIFICANT CHANGE UP (ref 6–8.3)
PROT UR-MCNC: ABNORMAL
PROTHROM AB SERPL-ACNC: 12 SEC — SIGNIFICANT CHANGE UP (ref 10.6–13.6)
RBC # BLD: 2.24 M/UL — LOW (ref 4.2–5.8)
RBC # FLD: 19.4 % — HIGH (ref 10.3–14.5)
RBC CASTS # UR COMP ASSIST: 1 /HPF — SIGNIFICANT CHANGE UP (ref 0–4)
RH IG SCN BLD-IMP: POSITIVE — SIGNIFICANT CHANGE UP
SAO2 % BLDV: 60 % — LOW (ref 67–88)
SODIUM SERPL-SCNC: 133 MMOL/L — LOW (ref 135–145)
SODIUM SERPL-SCNC: 135 MMOL/L — SIGNIFICANT CHANGE UP (ref 135–145)
SP GR SPEC: 1.02 — SIGNIFICANT CHANGE UP (ref 1.01–1.02)
UROBILINOGEN FLD QL: SIGNIFICANT CHANGE UP
WBC # BLD: 8.34 K/UL — SIGNIFICANT CHANGE UP (ref 3.8–10.5)
WBC # FLD AUTO: 8.34 K/UL — SIGNIFICANT CHANGE UP (ref 3.8–10.5)
WBC UR QL: 3 /HPF — SIGNIFICANT CHANGE UP (ref 0–5)

## 2020-08-25 PROCEDURE — 99291 CRITICAL CARE FIRST HOUR: CPT | Mod: 25

## 2020-08-25 PROCEDURE — 93971 EXTREMITY STUDY: CPT | Mod: 26

## 2020-08-25 PROCEDURE — 88346 IMFLUOR 1ST 1ANTB STAIN PX: CPT | Mod: 26

## 2020-08-25 PROCEDURE — 71045 X-RAY EXAM CHEST 1 VIEW: CPT | Mod: 26

## 2020-08-25 PROCEDURE — 93505 ENDOMYOCARDIAL BIOPSY: CPT | Mod: 26

## 2020-08-25 PROCEDURE — 76882 US LMTD JT/FCL EVL NVASC XTR: CPT | Mod: 26,LT

## 2020-08-25 PROCEDURE — 99291 CRITICAL CARE FIRST HOUR: CPT

## 2020-08-25 PROCEDURE — 99232 SBSQ HOSP IP/OBS MODERATE 35: CPT

## 2020-08-25 PROCEDURE — 36556 INSERT NON-TUNNEL CV CATH: CPT

## 2020-08-25 PROCEDURE — 88307 TISSUE EXAM BY PATHOLOGIST: CPT | Mod: 26

## 2020-08-25 PROCEDURE — 99232 SBSQ HOSP IP/OBS MODERATE 35: CPT | Mod: GC

## 2020-08-25 PROCEDURE — 99223 1ST HOSP IP/OBS HIGH 75: CPT | Mod: GC

## 2020-08-25 RX ORDER — FUROSEMIDE 40 MG
40 TABLET ORAL ONCE
Refills: 0 | Status: COMPLETED | OUTPATIENT
Start: 2020-08-25 | End: 2020-08-25

## 2020-08-25 RX ORDER — INSULIN LISPRO 100/ML
3 VIAL (ML) SUBCUTANEOUS
Refills: 0 | Status: DISCONTINUED | OUTPATIENT
Start: 2020-08-25 | End: 2020-08-27

## 2020-08-25 RX ORDER — INSULIN GLARGINE 100 [IU]/ML
10 INJECTION, SOLUTION SUBCUTANEOUS AT BEDTIME
Refills: 0 | Status: DISCONTINUED | OUTPATIENT
Start: 2020-08-25 | End: 2020-08-27

## 2020-08-25 RX ADMIN — Medication 30 MILLIGRAM(S): at 08:51

## 2020-08-25 RX ADMIN — Medication 5 UNIT(S): at 08:50

## 2020-08-25 RX ADMIN — POSACONAZOLE 300 MILLIGRAM(S): 100 TABLET, DELAYED RELEASE ORAL at 08:51

## 2020-08-25 RX ADMIN — SODIUM CHLORIDE 3 MILLILITER(S): 9 INJECTION INTRAMUSCULAR; INTRAVENOUS; SUBCUTANEOUS at 05:54

## 2020-08-25 RX ADMIN — PANTOPRAZOLE SODIUM 40 MILLIGRAM(S): 20 TABLET, DELAYED RELEASE ORAL at 08:51

## 2020-08-25 RX ADMIN — Medication 500 MILLIGRAM(S): at 12:04

## 2020-08-25 RX ADMIN — SODIUM CHLORIDE 3 MILLILITER(S): 9 INJECTION INTRAMUSCULAR; INTRAVENOUS; SUBCUTANEOUS at 21:41

## 2020-08-25 RX ADMIN — EVEROLIMUS 0.5 MILLIGRAM(S): 10 TABLET ORAL at 08:50

## 2020-08-25 RX ADMIN — ATOVAQUONE 1500 MILLIGRAM(S): 750 SUSPENSION ORAL at 08:49

## 2020-08-25 RX ADMIN — SODIUM CHLORIDE 3 MILLILITER(S): 9 INJECTION INTRAMUSCULAR; INTRAVENOUS; SUBCUTANEOUS at 16:34

## 2020-08-25 RX ADMIN — Medication 500 MILLIGRAM(S): at 20:10

## 2020-08-25 RX ADMIN — EVEROLIMUS 0.5 MILLIGRAM(S): 10 TABLET ORAL at 20:23

## 2020-08-25 RX ADMIN — MAGNESIUM OXIDE 400 MG ORAL TABLET 400 MILLIGRAM(S): 241.3 TABLET ORAL at 08:51

## 2020-08-25 RX ADMIN — Medication 2 GRAM(S): at 06:14

## 2020-08-25 RX ADMIN — Medication 500 MILLIGRAM(S): at 06:14

## 2020-08-25 RX ADMIN — Medication 1000 UNIT(S): at 08:50

## 2020-08-25 RX ADMIN — Medication 40 MILLIGRAM(S): at 20:44

## 2020-08-25 RX ADMIN — Medication 100 MILLIGRAM(S): at 06:13

## 2020-08-25 RX ADMIN — Medication 1 MILLIGRAM(S): at 08:50

## 2020-08-25 RX ADMIN — Medication 100 MILLIGRAM(S): at 17:00

## 2020-08-25 NOTE — PROGRESS NOTE ADULT - ATTENDING COMMENTS
Patient seen/examined.  Agree w above note and plan and have discussed plan w house staff.  C/o abdominal "tightness", having watery diarrhea.  Abdomen distended, non-tender.  Continue abx for c dif.  Suggest NPO until distention improves.      Ayden Mendoza MD Patient seen/examined.  Agree w above note and plan and have discussed plan w house staff.  C/o abdominal "tightness", having watery diarrhea.  Abdomen distended, non-tender.  Continue abx for c dif.  Suggest NPO until distention improves.  Understand concern for abdominal compartment syndrome, though I feel not the case at this time.  If suspicion for compartment syndrome, suggest ICU, diaz, monitor hourly urine output, and measure bladder pressures (tho not accurate in non-paralyzed patient)    Ayden Mendoza MD

## 2020-08-25 NOTE — PROGRESS NOTE ADULT - PROBLEM SELECTOR PLAN 5
-Klebsiella bacteremia from 8/13 which have since cleared. CT with ? pneumonia and urine culture with > 3 organisms concerning for contaminant.   -S/p course of abxs now  -ID recs appreciated

## 2020-08-25 NOTE — PROGRESS NOTE ADULT - ATTENDING COMMENTS
Patient seen and examined  Case discussed with Dr. Mckoy    I agree with his interval history exam and plans as noted aobve    Patient remains with a distended abdomen  Currently reports two episodes of diarrhea today  NORMAN- with increasing creatinine  Increasing WBC in the setting of baseline leukopenia  albumin wnl    Concern for sever C.diff colitis/proctitis with surgery following  Agree with volume assessment through right hear cath and transfer to CTU  Repeat abdominal ultrasound or X-ray    Continue therapy with   oral Vancomycin 500mg qid plus IV Flagyl 500mg q 8hr  Will consider the risks/benefits of placing a rectal tube for Vancomycin enemas    Gary Lujan MD  [phone]  After 5pm/weekends [phone]

## 2020-08-25 NOTE — PROGRESS NOTE ADULT - ASSESSMENT
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 Patient was treated w/ prednisone and Rituximab. Tacro was discontinued and patient was also transitioned to everolimus. Admitted again 6/16-6/19 for hyperglycemia. On 8/11 Reported to transplant team having one done black tarry stool-  instructed to present to [facility] for hemolytic anemia. Patient has been following with Hematology outpatient.  Denies CP  N/V diarrhea SOB. (11 Aug 2020 20:08)  Surgery/Hospital Course:  8/11 Admitted to [facility] with symptomatic anemia  8/13 EGD for investigation of tarry stools  8/14 CTU for SVT and respiratory distress   8/15 SB capsule: stomach & SB lesions, likely ulcers.  8/17 EGD/push enteroscopy w/ angioectasias/erosions in small bowel. Gastropathy and esophagitis. Ulcer seen on VCE most likely scope trauma.    8/18 VSS transferred back to floor.   8/19 VSS, H/H stable 8.8/24.5, would continue to hold asa per transplant team. Follow up CMV PCR and gastric biopsy.   8/20 VS 9.0/28.4 stable Gastric biopsy results LA Grade A esophagitis.  - Acute gastritis. Biopsies taken to r/o CMV, No ulceration seen despite finding on capsule endoscopy - likely 2/2 scope  trauma that has since resolved. Pathology pending.  8/21 VSS H/H  8.1/25.7  trending down will monitor prednisone taper 30 mg today. Procardia 120 initiated today [facility] biopsy Monday.   8/22 VVS; Patient reports loose stools x 2-3 days with 1 episode today.  Stool sent for C-dif and GI PCR. Abdominal X-ray revealed: dilated loops of bowel. Abdomen distended.  Patient denies N/V. GI called to re-evaluate. Continue with current medication regimen.  Awaiting for upcoming cardiac biopsy on Monday 8/24.  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 Patient was treated w/ prednisone and Rituximab. Tacro was discontinued and patient was also transitioned to everolimus. Admitted again 6/16-6/19 for hyperglycemia. On 8/11 Reported to transplant team having one done black tarry stool-  instructed to present to [facility] for hemolytic anemia. Patient has been following with Hematology outpatient.  Denies CP  N/V diarrhea SOB. (11 Aug 2020 20:08)  Surgery/Hospital Course:  8/11 Admitted to [facility] with symptomatic anemia  8/13 EGD for investigation of tarry stools  8/14 CTU for SVT and respiratory distress   8/15 SB capsule: stomach & SB lesions, likely ulcers.  8/17 EGD/push enteroscopy w/ angioectasias/erosions in small bowel. Gastropathy and esophagitis. Ulcer seen on VCE most likely scope trauma.    8/18 VSS transferred back to floor.   8/19 VSS, H/H stable 8.8/24.5, would continue to hold asa per transplant team. Follow up CMV PCR and gastric biopsy.   8/20 VS 9.0/28.4 stable Gastric biopsy results LA Grade A esophagitis.  - Acute gastritis. Biopsies taken to r/o CMV, No ulceration seen despite finding on capsule endoscopy - likely 2/2 scope  trauma that has since resolved. Pathology pending.  8/21 VSS H/H  8.1/25.7  trending down will monitor prednisone taper 30 mg today. Procardia 120 initiated today [facility] biopsy Monday.   8/22 VVS; Patient reports loose stools x 2-3 days with 1 episode today.  Stool sent for C-dif and GI PCR. Abdominal X-ray revealed: dilated loops of bowel. Abdomen distended.  Patient denies N/V. GI called to re-evaluate. Continue with current medication regimen.  Awaiting for upcoming cardiac biopsy on Monday 8/24.  8/23   vss    creat up to 2.28 ,  repeating CMP,  TTE ordered  Bladder scan   8/24   cardiac bx cancelled   elev creat  to 2.74   cardio renal cslt called,    + CDIF   inc vanco to 500 q6   ID following  8/25 VSS: RHC today as per transplant team; transfuse 1 unit PRBC today as per Dr. Viramontes; continue vanco for cdiff;   npo/ bowel rest as per general surgery   transfer patient to CTU after cath today

## 2020-08-25 NOTE — PROGRESS NOTE ADULT - PROBLEM SELECTOR PLAN 2
Abdominal X-ray revealed dilated loops of bowel   GI called    CT scan  _ rectal thickening   C-dif   now on vanco   500 q6    ID following Abdominal X-ray revealed dilated loops of bowel   GI called    CT scan  _ rectal thickening   C-dif   now on vanco   500 q6    ID following  npo as per general surgery

## 2020-08-25 NOTE — PROGRESS NOTE ADULT - PROBLEM SELECTOR PLAN 2
-S/p mult pRBC this admission  -Repeat EGD/push enteroscopy w/ angioectasias/erosions. No e/o continued GIB.  -GI cleared use of ASA, but will hold for now given GIB and lack of strong indication  -Appreciate heme recs, no signs of hemolysis at this time  -Continue oral iron   -Tapering prednisone per hematology: 30 mg daily (started 8/21) followed by 10 mg decrease every 7 days  - Decrease PPI to daily. Discuss with GI when can change to PO. -S/p mult pRBC this admission  -Repeat EGD/push enteroscopy w/ angioectasias/erosions. No e/o continued GIB.  -GI cleared use of ASA, but will hold for now given GIB and lack of strong indication  -Appreciate heme recs, no signs of hemolysis at this time  -Continue oral iron   -Tapering prednisone per hematology: 30 mg daily (started 8/21) followed by 10 mg decrease every 7 days  - Decrease PPI to daily  - unclear ongoing dropping H/H; recheck hemolysis labs

## 2020-08-25 NOTE — PROGRESS NOTE ADULT - SUBJECTIVE AND OBJECTIVE BOX
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 Patient was treated w/ prednisone and Rituximab. Tacro was discontinued and patient was also transitioned to everolimus  Admitted  -  for hyperglycemia.  Reported to transplant team having one done black tarry stool-  instructed to  present to [facility] for hemolytic anemia. Patient has been following with Hematology outpatient.  Denies CP  N/V diarrhea SOB. (11 Aug 2020 20:08)      Surgery/Hospital Course:   Admitted to [facility] with symptomatic anemia   EGD for investigation of tarry stools  8/15 SB capsule: stomach & SB lesions, likely ulcers.   EGD/push enteroscopy w/ angioectasias/erosions in small bowel. Gastropathy and esophagitis. Ulcer seen on VCE most likely scope trauma.     VSS transferred back to floor.    VSS, H/H stable 8.8/24.5, would continue to hold asa per transplant team. Follow up CMV PCR and gastric biopsy.    VS 9.0/28.4 stable Gastric biopsy results LA Grade A esophagitis.  - Acute gastritis. Biopsies taken to r/o CMV, No ulceration seen despite finding on capsule endoscopy - likely 2/2 scope  trauma that has since resolved. Pathology pending.   VSS H/H  8.1/25.7  trending down will monitor prednisone taper 30 mg today. Procardia 120 initiated today RH biopsy Monday.    VVS; Patient reports loose stools x 2-3 days with 1 episode today.  Stool sent for C-dif and GI PCR. Abdominal X-ray revealed: dilated loops of bowel. Abdomen distended.  Patient denies N/V. GI called to re-evaluate. Continue with current medication regimen.  Awaiting for upcoming cardiac biopsy on .     vss    creat up to 2.28 ,  repeating CMP,  TTE ordered  Bladder scan      cardiac bx cancelled   elev creat  to 2.74   cardio renal cslt called,    + CDIF   inc vanco to 500 q6   ID following   VSS: RHC today as per transplant team; transfuse 1 unit PRBC today as per Dr. Viramontes; continue vanco for cdiff;   npo/ bowel rest as per general surgery   transfer patient to CTU after cath today     Today:    REVIEW OF SYSTEMS:  Gen: No fever  EYES/ENT: No visual changes;  No vertigo or throat pain   NECK: No pain   RES:  No shortness of breath or Cough  CARD: No chest pain   GI:  c/o abdominal distension  : No dysuria  NEURO: No weakness  SKIN: No itching, rashes     ICU Vital Signs Last 24 Hrs  T(C): 36.9 (25 Aug 2020 12:43), Max: 36.9 (25 Aug 2020 12:43)  T(F): 98.4 (25 Aug 2020 12:43), Max: 98.4 (25 Aug 2020 12:43)  HR: 116 (25 Aug 2020 17:45) (115 - 120)  BP: 114/40 (25 Aug 2020 17:30) (99/62 - 116/67)  BP(mean): 63 (25 Aug 2020 17:30) (63 - 85)  ABP: 112/44 (25 Aug 2020 17:45) (112/44 - 112/44)  ABP(mean): 65 (25 Aug 2020 17:45) (65 - 65)  RR: 44 (25 Aug 2020 17:45) (18 - 44)  SpO2: 94% (25 Aug 2020 17:45) (94% - 98%)      Physical Exam:  Gen:  Awake, alert   CNS: non focal 	  Neck: no JVD  RES : clear , no wheezing              CVS: Regular  rhythm. Normal S1/S2  Abd: + distended, positive hyperactive bowel sounds, + diarrhea   Skin: No rash.  Extremities:   RUVALCABA; +2 generalized edema, neg calf tenderness.   PPP bilaterally      ============================I/O===========================   I&O's Summary    24 Aug 2020 07:01  -  25 Aug 2020 07:00  --------------------------------------------------------  Total Ins: 800 mL // Total Outs: 250 mL // Total Net: 550 mL       25 Aug 2020 07:01  -  25 Aug 2020 18:00  --------------------------------------------------------  Total Ins: 220 mL // Total Outs: 150 mL // Total Net: 70 mL     ============================ LABS =========================           	            	7.2  7.18 )-----------( 279( 24 Aug 2020 9:00 )             24.4    08-25    133<L>  |  97  |  70<H>  ----------------------------<  72  3.4<L>   |  18<L>  |  3.08<H>    Ca    8.3<L>      25 Aug 2020 10:54  Phos  5.3     08-24  Mg     3.0     08-    TPro  6.1  /  Alb  3.4  /  TBili  0.6  /  DBili  x   /  AST  40  /  ALT  29  /  AlkPhos  68  08-25    LIVER FUNCTIONS - ( 25 Aug 2020 10:54 )  Alb: 3.4 g/dL / Pro: 6.1 g/dL / ALK PHOS: 68 U/L / ALT: 29 U/L / AST: 40 U/L / GGT: x               Urinalysis Basic - ( 24 Aug 2020 21:27 )    Color: Yellow / Appearance: Slightly Turbid / S.018 / pH: x  Gluc: x / Ketone: Negative  / Bili: Negative / Urobili: <2 mg/dL   Blood: x / Protein: 30 mg/dL / Nitrite: Negative   Leuk Esterase: Negative / RBC: 1 /HPF / WBC 3 /HPF   Sq Epi: x / Non Sq Epi: 2 /HPF / Bacteria: Negative      ======================Micro/Rad/Cardio=================  Culture: Reviewed   CXR: Reviewed  Echo: Reviewed  ======================================================  PAST MEDICAL & SURGICAL HISTORY:  H/O hemolytic anemia  H/O autoimmune hemolytic anemia  Knee pain, right  HLD (hyperlipidemia)  Former smoker  DVT of upper extremity (deep vein thrombosis)  Hepatitis C virus  GIB (gastrointestinal bleeding)  Ventricular fibrillation: s/p AICD  PAF (paroxysmal atrial fibrillation): on xarelto  Non-Ischemic Cardiomyopathy  SVT (Supraventricular Tachycardia)  HTN  CHF (Congestive Heart Failure)  S/P right heart catheterization: biopsy multiple  H/O heart transplant: 2018  Status post left hip replacement  History of Prior Ablation Treatment: for afib  AICD (Automatic Cardioverter/Defibrillator) Present: [employer] with 1 [employer] lead09- explanted and replaced with Medtronic 2 leads on 09    ====================ASSESSMENT ==============  Heart transplant 2018 for ACC/AHA stage D NICM   Supraventricular tachycardia  severe hypertension  Klebsiella oxytoca bactermia  Sepsis  acute respiratory failure, resolved  Hyperlactatemia acidosis, resolved  Leukopenia  Acute blood loss anemia, stable  GI Bleed  Cdiff diarrhea  CKD    Plan:  ====================== NEUROLOGY=====================  Patient is nonfocal, continue to monitor neuro status as per ICU protocol. Addressed analgesic regimen to optimize function. Continued mobilization as tolerated.    ==================== RESPIRATORY======================  Comfortable on room air. Continue close monitoring of breathing pattern, respiratory rate, the following of continuous pulse oximetry for support and intermittent blood gas analysis to prevent decompensation.    ====================CARDIOVASCULAR==================  Continue with pravastatin for transplant coronary artery disease prophylaxis. Aspirin on hold given gastrointestinal bleeding. Continue with Prednisone taper, patient is off Cellcept while on high dose steroids.     pravastatin 20 milliGRAM(s) Oral at bedtime  predniSONE   Tablet 30 milliGRAM(s) Oral <User Schedule>  ===================HEMATOLOGIC/ONC ===================  Anemia s/p multiple blood transfusions. Monitor hemoglobin and hematocrit levels.    ===================== RENAL =========================  Patient with acute kidney injury on chronic kidney disease. Optimize renal perfusion with adequate volume resuscitation and continued monitoring of urine output, fluid balance, BUN/Creatinine.     ==================== GASTROINTESTINAL===================  Patient is tolerating NPO diet as per general surgery. Protonix for stress ulcer prophylaxis.     =======================    ENDOCRINE  =====================  Stress hyperglycemia required review and adjustment of regular Insulin sliding scale, lantus and glycemic regimen while following serial glucose levels to help achieve and maintain euglycemia     ========================INFECTIOUS DISEASE================  Clostridioides difficile PCR positive, continue with vancomycin PO and metronidazole IVPB. Patient is afebrile.     atovaquone Suspension 1500 milliGRAM(s) Oral daily  metroNIDAZOLE  IVPB      metroNIDAZOLE  IVPB 500 milliGRAM(s) IV Intermittent every 8 hours  posaconazole DR Tablet 300 milliGRAM(s) Oral daily  vancomycin    Solution 500 milliGRAM(s) Oral every 6 hours      Patient requires continuous monitoring with bedside rhythm monitoring, pulse ox monitoring, CVP, MAP monitoring and intermittent blood gas analysis. Care plan discussed with ICU care team. Patient remained critical and at risk for life threatening decompensation.     By signing my name below, I, Tonny Sheldon, attest that this documentation has been prepared under the direction and in the presence of Mechelle Valerio MD  Electronically signed: Katty Trejo, 20 @ 18:00    I, Mechelle Valerio MD, personally performed the services described in this documentation. All medical record entries made by the scribe were at my direction and in my presence. 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 Patient was treated w/ prednisone and Rituximab. Tacro was discontinued and patient was also transitioned to everolimus  Admitted  -  for hyperglycemia.  Reported to transplant team having one done black tarry stool-  instructed to  present to [facility] for hemolytic anemia. Patient has been following with Hematology outpatient.  Denies CP  N/V diarrhea SOB. (11 Aug 2020 20:08)      Surgery/Hospital Course:   Admitted to [facility] with symptomatic anemia   EGD for investigation of tarry stools  8/15 SB capsule: stomach & SB lesions, likely ulcers.   EGD/push enteroscopy w/ angioectasias/erosions in small bowel. Gastropathy and esophagitis. Ulcer seen on VCE most likely scope trauma.     VSS transferred back to floor.    VSS, H/H stable 8.8/24.5, would continue to hold asa per transplant team. Follow up CMV PCR and gastric biopsy.    VS 9.0/28.4 stable Gastric biopsy results LA Grade A esophagitis.  - Acute gastritis. Biopsies taken to r/o CMV, No ulceration seen despite finding on capsule endoscopy - likely 2/2 scope  trauma that has since resolved. Pathology pending.   VSS H/H  8.1/25.7  trending down will monitor prednisone taper 30 mg today. Procardia 120 initiated today RH biopsy Monday.    VVS; Patient reports loose stools x 2-3 days with 1 episode today.  Stool sent for C-dif and GI PCR. Abdominal X-ray revealed: dilated loops of bowel. Abdomen distended.  Patient denies N/V. GI called to re-evaluate. Continue with current medication regimen.  Awaiting for upcoming cardiac biopsy on .     vss    creat up to 2.28 ,  repeating CMP,  TTE ordered  Bladder scan      cardiac bx cancelled   elev creat  to 2.74   cardio renal cslt called,    + CDIF   inc vanco to 500 q6   ID following   VSS: RHC today as per transplant team; transfuse 2 units PRBC today as per Dr. Viramontes; continue vanco for cdiff;   npo/ bowel rest as per general surgery   transfer patient to CTU after cath today     Today:    REVIEW OF SYSTEMS:  Gen: No fever  EYES/ENT: No visual changes;  No vertigo or throat pain   NECK: No pain   RES:  No shortness of breath or Cough  CARD: No chest pain   GI:  c/o abdominal distension  : No dysuria  NEURO: No weakness  SKIN: No itching, rashes     ICU Vital Signs Last 24 Hrs  T(C): 36.9 (25 Aug 2020 12:43), Max: 36.9 (25 Aug 2020 12:43)  T(F): 98.4 (25 Aug 2020 12:43), Max: 98.4 (25 Aug 2020 12:43)  HR: 116 (25 Aug 2020 17:45) (115 - 120)  BP: 114/40 (25 Aug 2020 17:30) (99/62 - 116/67)  BP(mean): 63 (25 Aug 2020 17:30) (63 - 85)  ABP: 112/44 (25 Aug 2020 17:45) (112/44 - 112/44)  ABP(mean): 65 (25 Aug 2020 17:45) (65 - 65)  RR: 44 (25 Aug 2020 17:45) (18 - 44)  SpO2: 94% (25 Aug 2020 17:45) (94% - 98%)      Physical Exam:  Gen:  Awake, alert   CNS: non focal 	  Neck: no JVD  RES : clear , no wheezing              CVS: Regular  rhythm. Normal S1/S2  Abd: + distended, tender, + BS  Skin: No rash.  Extremities:   RUVALCABA; +2 generalized edema, neg calf tenderness. +4 RUE edema  PPP bilaterally      ============================I/O===========================   I&O's Summary    24 Aug 2020 07:01  -  25 Aug 2020 07:00  --------------------------------------------------------  Total Ins: 800 mL // Total Outs: 250 mL // Total Net: 550 mL       25 Aug 2020 07:01  -  25 Aug 2020 18:00  --------------------------------------------------------  Total Ins: 220 mL // Total Outs: 150 mL // Total Net: 70 mL     ============================ LABS =========================           	            	7.2  7.18 )-----------( 279( 24 Aug 2020 9:00 )             24.4    08-25    133<L>  |  97  |  70<H>  ----------------------------<  72  3.4<L>   |  18<L>  |  3.08<H>    Ca    8.3<L>      25 Aug 2020 10:54  Phos  5.3     08-24  Mg     3.0         TPro  6.1  /  Alb  3.4  /  TBili  0.6  /  DBili  x   /  AST  40  /  ALT  29  /  AlkPhos  68  08-25    LIVER FUNCTIONS - ( 25 Aug 2020 10:54 )  Alb: 3.4 g/dL / Pro: 6.1 g/dL / ALK PHOS: 68 U/L / ALT: 29 U/L / AST: 40 U/L / GGT: x               Urinalysis Basic - ( 24 Aug 2020 21:27 )    Color: Yellow / Appearance: Slightly Turbid / S.018 / pH: x  Gluc: x / Ketone: Negative  / Bili: Negative / Urobili: <2 mg/dL   Blood: x / Protein: 30 mg/dL / Nitrite: Negative   Leuk Esterase: Negative / RBC: 1 /HPF / WBC 3 /HPF   Sq Epi: x / Non Sq Epi: 2 /HPF / Bacteria: Negative      ======================Micro/Rad/Cardio=================  Culture: Reviewed   CXR: Reviewed  Echo: Reviewed  ======================================================  PAST MEDICAL & SURGICAL HISTORY:  H/O hemolytic anemia  H/O autoimmune hemolytic anemia  Knee pain, right  HLD (hyperlipidemia)  Former smoker  DVT of upper extremity (deep vein thrombosis)  Hepatitis C virus  GIB (gastrointestinal bleeding)  Ventricular fibrillation: s/p AICD  PAF (paroxysmal atrial fibrillation): on xarelto  Non-Ischemic Cardiomyopathy  SVT (Supraventricular Tachycardia)  HTN  CHF (Congestive Heart Failure)  S/P right heart catheterization: biopsy multiple  H/O heart transplant: 2018  Status post left hip replacement  History of Prior Ablation Treatment: for afib  AICD (Automatic Cardioverter/Defibrillator) Present: [employer] with 1 [employer] lead09- explanted and replaced with [employer]tr[employer] 2 leads on 09    ====================ASSESSMENT ==============  Heart transplant 2018 for ACC/AHA stage D NICM   Supraventricular tachycardia  severe hypertension  Klebsiella oxytoca bactermia  Sepsis  acute respiratory failure, resolved  Hyperlactatemia acidosis, resolved  Leukopenia  Acute blood loss anemia, stable  GI Bleed  Cdiff diarrhea  CKD    Plan:  ====================== NEUROLOGY=====================  Patient is nonfocal, continue to monitor neuro status as per ICU protocol. Addressed analgesic regimen to optimize function. Continued mobilization as tolerated.    ==================== RESPIRATORY======================  Comfortable on room air. Continue close monitoring of breathing pattern, respiratory rate, the following of continuous pulse oximetry for support and intermittent blood gas analysis to prevent decompensation.    ====================CARDIOVASCULAR==================  Continue with pravastatin for transplant coronary artery disease prophylaxis. Aspirin on hold given history of gastrointestinal bleeding. Continue with Prednisone taper, patient is off Cellcept while on high dose steroids. Procardia discontinued to allow for higher blood pressure and renal perfusion.    pravastatin 20 milliGRAM(s) Oral at bedtime  predniSONE   Tablet 30 milliGRAM(s) Oral <User Schedule>  ===================HEMATOLOGIC/ONC ===================  Anemia s/p multiple blood transfusions. Two units ordered this evening. Follow up hemolysis work up ordered, reticulocyte count, haptoglobin, D-dimer, LDH. Monitor hemoglobin and hematocrit levels.    ===================== RENAL =========================  Patient with acute kidney injury on chronic kidney disease. Optimize renal perfusion with adequate volume resuscitation and continued monitoring of urine output, fluid balance, BUN/Creatinine.  Patient has anasarca, but much of fluid is likely extravascular. Lasix ordered with blood transfusions, but would avoid negative fluid balance in light of the acute renal failure. Procardia discontinued as this may have contributed to worsening renal function related to hypotension. Everolimus level sent. Plan to continue though for immunosuppression as discussed with Dr. Viramotnes.    ==================== GASTROINTESTINAL===================  Patient is NPO as per general surgery although diagnosis, aside from Cdiff colitis is unclear. Protonix for stress ulcer prophylaxis.     =======================    ENDOCRINE  =====================  Stress hyperglycemia required review and adjustment of regular Insulin sliding scale, lantus and glycemic regimen while following serial glucose levels to help achieve and maintain euglycemia     ========================INFECTIOUS DISEASE================  Clostridium difficile PCR positive, continue with vancomycin PO and metronidazole IVPB. Patient is afebrile.     atovaquone Suspension 1500 milliGRAM(s) Oral daily  metroNIDAZOLE  IVPB      metroNIDAZOLE  IVPB 500 milliGRAM(s) IV Intermittent every 8 hours  posaconazole DR Tablet 300 milliGRAM(s) Oral daily  vancomycin    Solution 500 milliGRAM(s) Oral every 6 hours      Patient requires continuous monitoring with bedside rhythm monitoring, pulse ox monitoring, CVP, MAP monitoring and intermittent blood gas analysis. Care plan discussed with ICU care team. Patient remained critical and at risk for life threatening decompensation.     I have spent 40 minutes providing critical care for this acutely ill patient.    By signing my name below, I, Tonny Sheldon, attest that this documentation has been prepared under the direction and in the presence of Mechelle Valerio MD  Electronically signed: Katty Trejo, 20 @ 18:00    I, Mechelle Valerio MD, personally performed the services described in this documentation. All medical record entries made by the ramuibe were at my direction and in my presence. I have reviewed the chart and agree that the record reflects my personal performance and is accurate and complete  Electronically signed: Mechelle Valerio MD

## 2020-08-25 NOTE — PROGRESS NOTE ADULT - SUBJECTIVE AND OBJECTIVE BOX
INFECTIOUS DISEASES FOLLOW UP    This is a follow up note for this  71 yo Male with  Anemia  s/p OHTx   interval development of abdominal distension and diarrhea- C.diff PCR detected    Interval event:  - Afebrile  - Ceftriaxone finished 10 days course yesterday  - Per surgery, no acute intervention for C.diff  - Pending morning labs    ROS:  CONSTITUTIONAL:  No fever, good appetite  CARDIOVASCULAR:  No chest pain or palpitations  RESPIRATORY:  No dyspnea  GASTROINTESTINAL:  c/o abdominal distension  GENITOURINARY:  No dysuria  NEUROLOGIC:  No headache,     Allergies  No Known Allergies    ANTIMICROBIALS:    atovaquone Suspension 1500 daily  metroNIDAZOLE  IVPB    metroNIDAZOLE  IVPB 500 every 8 hours  posaconazole DR Tablet 300 daily  vancomycin    Solution 500 every 6 hours    OTHER MEDS:  MEDICATIONS  (STANDING):  acetaminophen   Tablet .. 650 every 6 hours PRN  everolimus (ZORTRESS) 0.5 <User Schedule>  insulin glargine Injectable (LANTUS) 14 at bedtime  insulin lispro (HumaLOG) corrective regimen sliding scale  three times a day before meals  insulin lispro Injectable (HumaLOG) 5 three times a day with meals  pantoprazole  Injectable 40 daily  pravastatin 20 at bedtime  predniSONE   Tablet 30 <User Schedule>      Vital Signs Last 24 Hrs  T(F): 98.1 (- @ 05:00), Max: 99.1 (-20 @ 19:50)    Vital Signs Last 24 Hrs  HR: 116 (- @ 05:00) (110 - 116)  BP: 99/62 (-20 @ 05:00) (99/62 - 100/66)  RR: 18 (- @ 05:00)  SpO2: 95% (- @ 05:00) (95% - 97%)  Wt(kg): --    PHYSICAL EXAM:  Constitutional: no acute distress, but lying in bed instead of the chair  Eyes: WALT, EOMI  Ear/Nose/Throat: no oral lesions, 	  Respiratory: clear BL  Cardiovascular: S1S2 sternotomy healed  Gastrointestinal: hyperactive BS, very distended abd, not overly tender on exam  Extremities:no e/e/c  No Lymphadenopathy  IV sites not inflammed.    Labs:                        7.2    7.18  )-----------( 279      ( 24 Aug 2020 09:00 )             24.4     08-24    137  |  100  |  59<H>  ----------------------------<  109<H>  3.6   |  21<L>  |  2.74<H>    Ca    8.7      24 Aug 2020 09:00  Phos  5.3       Mg     2.8         TPro  5.8<L>  /  Alb  3.6  /  TBili  0.6  /  DBili  x   /  AST  43<H>  /  ALT  37  /  AlkPhos  59        WBC Trend:  WBC Count: 7.18 (20 @ 09:00)  WBC Count: 5.47 (20 @ 06:08)  WBC Count: 4.70 (20 @ 08:54)  WBC Count: 3.97 (20 @ 08:17)      Creatine Trend:  Creatinine, Serum: 2.74 ()  Creatinine, Serum: 2.31 ()  Creatinine, Serum: 2.28 ()  Creatinine, Serum: 1.37 ()      Liver Biochemical Testing Trend:  Alanine Aminotransferase (ALT/SGPT): 37 ()  Aspartate Aminotransferase (AST/SGOT): 43 (20 @ 10:51)  Bilirubin Total, Serum: 0.6 ()      Trend LDH  20 @ 08:51  376<H>  20 @ 08:50  390<H>  20 @ 07:26  323<H>  20 @ 07:16  266<H>  20 @ 07:53  246<H>  20 @ 07:39  247<H>  20 @ 07:42  223  20 @ 07:45  216  20 @ 07:15  237  20 @ 08:29  256<H>  20 @ 11:15  253<H>  20 @ 07:47  258<H>      Urinalysis Basic - ( 24 Aug 2020 21:27 )    Color: Yellow / Appearance: Slightly Turbid / S.018 / pH: x  Gluc: x / Ketone: Negative  / Bili: Negative / Urobili: <2 mg/dL   Blood: x / Protein: 30 mg/dL / Nitrite: Negative   Leuk Esterase: Negative / RBC: 1 /HPF / WBC 3 /HPF   Sq Epi: x / Non Sq Epi: 2 /HPF / Bacteria: Negative      MICROBIOLOGY:    C Diff by PCR Result: Detected ( @ 01:54)    C. difficile GDH &amp; toxins A/B by EIA (20 @ 18:22)    Clostridium difficile GDH Toxins A&amp;B, EIA:   Indeterminate    Clostridium difficile GDH Interpretation: This specimen is positive for C. Difficile glutamate dehydrogenase (GDH)  antigen and negative for C. Difficile Toxins A & B, by EIA.  GDH is a  highly sensitive screening marker for C. Difficile that is produced in  large amounts by all C. Difficilestrains, both toxigenic and  nontoxigenic.  Specimens that are GDH positive and toxin negative will be  tested further by PCR to detect toxin gene sequences associated with  toxin producing C. Difficle.    GI PCR Panel, Stool (collected 23 Aug 2020 02:01)  Source: .Stool Feces  Final Report:    GI PCR Results: NOT detected    Culture - Urine (collected 14 Aug 2020 13:32)  Source: .Urine Clean Catch (Midstream)  Final Report:    No growth    Culture - Blood (collected 14 Aug 2020 01:32)  Source: .Blood Blood  Final Report:    No Growth Final    Culture - Blood (collected 14 Aug 2020 01:32)  Source: .Blood Blood  Final Report:    No Growth Final    Culture - Blood (collected 13 Aug 2020 14:48)  Source: .Blood Blood-Peripheral  Final Report:    Growth in aerobic and anaerobic bottles: Klebsiella oxytoca/Raoutella    ornithinolytica    See previous culture [id number]    Culture - Blood (collected 13 Aug 2020 12:14)  Source: .Blood Blood-Peripheral  Final Report:    Growth in anaerobic bottle: Klebsiella oxytoca/Raoutella ornithinolytica    "Due to technical problems, Proteus sp. will Not be reported as part of    the BCID panel until further notice"    ***Blood Panel PCR results on this specimen are available    approximately 3 hours after the Gram stain result.***    Gram stain, PCR, and/or culture results may not always    correspond due to difference in methodologies.    ************************************************************    This PCR assay was performed using[employer].    The following targets are tested for: Enterococcus,    vancomycin resistant enterococci, Listeria monocytogenes,    coagulase negative staphylococci, S. aureus,    methicillin resistant S. aureus, Streptococcus agalactiae    (Group B), S.pneumoniae, S. pyogenes (Group A),    Acinetobacter baumannii, Enterobacter cloacae, E. coli,    Klebsiella oxytoca, K. pneumoniae, Proteus sp.,    Serratia marcescens, Haemophilus influenzae,    Neisseria meningitidis, Pseudomonas aeruginosa, Candida    albicans, C. glabrata, C krusei, C parapsilosis,    C. tropicalis and the KPC resistance gene.  Organism: Blood Culture PCR  Klebsiella oxytoca /Raoutella ornithinolytica  Organism: Klebsiella oxytoca /Raoutella ornithinolytica    Sensitivities:      -  Amikacin: S <=16      -  Ampicillin: R >16 These ampicillin results predict results for amoxicillin      -  Ampicillin/Sulbactam: I 16/8 Enterobacter, Citrobacter, and Serratia may develop resistance during prolonged therapy (3-4 days)      -  Aztreonam: S <=4      -  Cefazolin: R >16 Enterobacter, Citrobacter, and Serratia may develop resistance during prolonged therapy (3-4 days)      -  Cefepime: S <=2      -  Cefoxitin: S <=8      -  Ceftriaxone: S <=1 Enterobacter, Citrobacter, and Serratia may develop resistance during prolonged therapy      -  Ciprofloxacin: S <=0.25      -  Ertapenem: S <=0.5      -  Gentamicin: S <=2      -  Imipenem: S <=1      -  Levofloxacin: S <=0.5      -  Meropenem: S <=1      -  Piperacillin/Tazobactam: S <=8      -  Tobramycin: S <=2      -  Trimethoprim/Sulfamethoxazole: R >2/38      Method Type: KAMILA  Organism: Blood Culture PCR    Sensitivities:      -  Klebsiella oxytoca: Detec      Method Type: PCR    Culture - Urine (collected 13 Aug 2020 00:40)  Source: .Urine Clean Catch (Midstream)  Final Report:    >=3 organisms. Probable collection contamination.    Culture - Blood (collected 2020 16:59)  Source: .Blood Blood-Peripheral  Final Report:    No Growth Final    Culture - Blood (collected 2020 14:19)  Source: .Blood Blood-Peripheral  Final Report:    No Growth Final    Babesia microti PCR, Blood. (collected 27 May 2020 10:16)  Source: .Blood  Final Report:    Babesia microti PCR    Results: NOT detected    ABS CD4: 120 /uL (18 @ 13:00)    HIV-1 RNA Quantitative, Viral Load:   NOT DET. (18 @ 19:10)  HIV-1 Viral Load Result: NOT DET. (18 @ 19:10)  HIV-1 RNA Quantitative, Viral Load:   NOT DET. (18 @ 19:25)  HIV-1 Viral Load Result: NOT DET. (18 @ 19:25)    OTHER TESTS:  COVID-19 PCR: NotDetec (20 @ 13:14)  COVID-19 PCR: NotDetec (20 @ 20:08)      RADIOLOGY & ADDITIONAL STUDIES:    < from: CT Abdomen and Pelvis w/ Oral Cont (20 @ 13:30) >  IMPRESSION:  Right lower lobe consolidation with air bronchograms unchanged since 2020. This could represent atelectasis and/or infection. Correlate clinically. Previously described patchy opacities in the left lower lobe and lingula have resolved. Trace right pleural effusion.Small right pleural effusion.    Rectal wall thickening. New since the previous exam. Correlate clinically for proctitis. Mild distention of the colon proximal to this. No small bowel obstruction.    < end of copied text > INFECTIOUS DISEASES FOLLOW UP    This is a follow up note for this  71 yo Male with  Anemia  s/p OHTx   interval development of abdominal distension and diarrhea- C.diff PCR detected    Interval event:  - Afebrile  - Ceftriaxone finished 10 days course yesterday  - Per surgery, no acute intervention for C.diff  - Pending morning labs  - 2 loose BMs yesterday, already had 2 loose BMs this morning. Abd same distension level as per pt.    ROS:  CONSTITUTIONAL:  No fever, good appetite  CARDIOVASCULAR:  No chest pain or palpitations  RESPIRATORY:  No dyspnea  GASTROINTESTINAL:  c/o abdominal distension  GENITOURINARY:  No dysuria  NEUROLOGIC:  No headache,     Allergies  No Known Allergies    ANTIMICROBIALS:    atovaquone Suspension 1500 daily  metroNIDAZOLE  IVPB    metroNIDAZOLE  IVPB 500 every 8 hours  posaconazole DR Tablet 300 daily  vancomycin    Solution 500 every 6 hours    OTHER MEDS:  MEDICATIONS  (STANDING):  acetaminophen   Tablet .. 650 every 6 hours PRN  everolimus (ZORTRESS) 0.5 <User Schedule>  insulin glargine Injectable (LANTUS) 14 at bedtime  insulin lispro (HumaLOG) corrective regimen sliding scale  three times a day before meals  insulin lispro Injectable (HumaLOG) 5 three times a day with meals  pantoprazole  Injectable 40 daily  pravastatin 20 at bedtime  predniSONE   Tablet 30 <User Schedule>      Vital Signs Last 24 Hrs  T(F): 98.1 (20 @ 05:00), Max: 99.1 (20 @ 19:50)    Vital Signs Last 24 Hrs  HR: 116 (20 @ 05:00) (110 - 116)  BP: 99/62 (- @ 05:00) (99/62 - 100/66)  RR: 18 (20 @ 05:00)  SpO2: 95% (- @ 05:00) (95% - 97%)  Wt(kg): --    PHYSICAL EXAM:  Constitutional: no acute distress, but lying in bed instead of the chair  Eyes: WALT, EOMI  Ear/Nose/Throat: no oral lesions, 	  Respiratory: clear BL  Cardiovascular: S1S2 sternotomy healed  Gastrointestinal: hyperactive BS, very distended abd, not overly tender on exam  Extremities:no e/e/c  No Lymphadenopathy  IV sites not inflammed.    Labs:                        7.2    7.18  )-----------( 279      ( 24 Aug 2020 09:00 )             24.4     08    137  |  100  |  59<H>  ----------------------------<  109<H>  3.6   |  21<L>  |  2.74<H>    Ca    8.7      24 Aug 2020 09:00  Phos  5.3       Mg     2.8         TPro  5.8<L>  /  Alb  3.6  /  TBili  0.6  /  DBili  x   /  AST  43<H>  /  ALT  37  /  AlkPhos  59        WBC Trend:  WBC Count: 7.18 (20 @ 09:00)  WBC Count: 5.47 (20 @ 06:08)  WBC Count: 4.70 (20 @ 08:54)  WBC Count: 3.97 (20 @ 08:17)      Creatine Trend:  Creatinine, Serum: 2.74 ()  Creatinine, Serum: 2.31 ()  Creatinine, Serum: 2.28 ()  Creatinine, Serum: 1.37 ()      Liver Biochemical Testing Trend:  Alanine Aminotransferase (ALT/SGPT): 37 ()  Aspartate Aminotransferase (AST/SGOT): 43 (20 @ 10:51)  Bilirubin Total, Serum: 0.6 ()      Trend LDH  20 @ 08:51  376<H>  20 @ 08:50  390<H>  20 @ 07:26  323<H>  20 @ 07:16  266<H>  20 @ 07:53  246<H>  20 @ 07:39  247<H>  20 @ 07:42  223  20 @ 07:45  216  20 @ 07:15  237  20 @ 08:29  256<H>  20 @ 11:15  253<H>  20 @ 07:47  258<H>      Urinalysis Basic - ( 24 Aug 2020 21:27 )    Color: Yellow / Appearance: Slightly Turbid / S.018 / pH: x  Gluc: x / Ketone: Negative  / Bili: Negative / Urobili: <2 mg/dL   Blood: x / Protein: 30 mg/dL / Nitrite: Negative   Leuk Esterase: Negative / RBC: 1 /HPF / WBC 3 /HPF   Sq Epi: x / Non Sq Epi: 2 /HPF / Bacteria: Negative      MICROBIOLOGY:    C Diff by PCR Result: Detected ( @ 01:54)    C. difficile GDH &amp; toxins A/B by EIA (20 @ 18:22)    Clostridium difficile GDH Toxins A&amp;B, EIA:   Indeterminate    Clostridium difficile GDH Interpretation: This specimen is positive for C. Difficile glutamate dehydrogenase (GDH)  antigen and negative for C. Difficile Toxins A & B, by EIA.  GDH is a  highly sensitive screening marker for C. Difficile that is produced in  large amounts by all C. Difficilestrains, both toxigenic and  nontoxigenic.  Specimens that are GDH positive and toxin negative will be  tested further by PCR to detect toxin gene sequences associated with  toxin producing C. Difficle.    GI PCR Panel, Stool (collected 23 Aug 2020 02:01)  Source: .Stool Feces  Final Report:    GI PCR Results: NOT detected    Culture - Urine (collected 14 Aug 2020 13:32)  Source: .Urine Clean Catch (Midstream)  Final Report:    No growth    Culture - Blood (collected 14 Aug 2020 01:32)  Source: .Blood Blood  Final Report:    No Growth Final    Culture - Blood (collected 14 Aug 2020 01:32)  Source: .Blood Blood  Final Report:    No Growth Final    Culture - Blood (collected 13 Aug 2020 14:48)  Source: .Blood Blood-Peripheral  Final Report:    Growth in aerobic and anaerobic bottles: Klebsiella oxytoca/Raoutella    ornithinolytica    See previous culture [id number]    Culture - Blood (collected 13 Aug 2020 12:14)  Source: .Blood Blood-Peripheral  Final Report:    Growth in anaerobic bottle: Klebsiella oxytoca/Raoutella ornithinolytica    "Due to technical problems, Proteus sp. will Not be reported as part of    the BCID panel until further notice"    ***Blood Panel PCR results on this specimen are available    approximately 3 hours after the Gram stain result.***    Gram stain, PCR, and/or culture results may not always    correspond due to difference in methodologies.    ************************************************************    This PCR assay was performed using[employer].    The following targets are tested for: Enterococcus,    vancomycin resistant enterococci, Listeria monocytogenes,    coagulase negative staphylococci, S. aureus,    methicillin resistant S. aureus, Streptococcus agalactiae    (Group B), S.pneumoniae, S. pyogenes (Group A),    Acinetobacter baumannii, Enterobacter cloacae, E. coli,    Klebsiella oxytoca, K. pneumoniae, Proteus sp.,    Serratia marcescens, Haemophilus influenzae,    Neisseria meningitidis, Pseudomonas aeruginosa, Candida    albicans, C. glabrata, C krusei, C parapsilosis,    C. tropicalis and the KPC resistance gene.  Organism: Blood Culture PCR  Klebsiella oxytoca /Raoutella ornithinolytica  Organism: Klebsiella oxytoca /Raoutella ornithinolytica    Sensitivities:      -  Amikacin: S <=16      -  Ampicillin: R >16 These ampicillin results predict results for amoxicillin      -  Ampicillin/Sulbactam: I 16/8 Enterobacter, Citrobacter, and Serratia may develop resistance during prolonged therapy (3-4 days)      -  Aztreonam: S <=4      -  Cefazolin: R >16 Enterobacter, Citrobacter, and Serratia may develop resistance during prolonged therapy (3-4 days)      -  Cefepime: S <=2      -  Cefoxitin: S <=8      -  Ceftriaxone: S <=1 Enterobacter, Citrobacter, and Serratia may develop resistance during prolonged therapy      -  Ciprofloxacin: S <=0.25      -  Ertapenem: S <=0.5      -  Gentamicin: S <=2      -  Imipenem: S <=1      -  Levofloxacin: S <=0.5      -  Meropenem: S <=1      -  Piperacillin/Tazobactam: S <=8      -  Tobramycin: S <=2      -  Trimethoprim/Sulfamethoxazole: R >2/38      Method Type: KAMILA  Organism: Blood Culture PCR    Sensitivities:      -  Klebsiella oxytoca: Detec      Method Type: PCR    Culture - Urine (collected 13 Aug 2020 00:40)  Source: .Urine Clean Catch (Midstream)  Final Report:    >=3 organisms. Probable collection contamination.    Culture - Blood (collected 2020 16:59)  Source: .Blood Blood-Peripheral  Final Report:    No Growth Final    Culture - Blood (collected 2020 14:19)  Source: .Blood Blood-Peripheral  Final Report:    No Growth Final    Babesia microti PCR, Blood. (collected 27 May 2020 10:16)  Source: .Blood  Final Report:    Babesia microti PCR    Results: NOT detected    ABS CD4: 120 /uL (18 @ 13:00)    HIV-1 RNA Quantitative, Viral Load:   NOT DET. (18 @ 19:10)  HIV-1 Viral Load Result: NOT DET. (18 @ 19:10)  HIV-1 RNA Quantitative, Viral Load:   NOT DET. (18 @ 19:25)  HIV-1 Viral Load Result: NOT DET. (18 @ 19:25)    OTHER TESTS:  COVID-19 PCR: NotDetec (20 @ 13:14)  COVID-19 PCR: NotDetec (20 @ 20:08)      RADIOLOGY & ADDITIONAL STUDIES:    < from: CT Abdomen and Pelvis w/ Oral Cont (20 @ 13:30) >  IMPRESSION:  Right lower lobe consolidation with air bronchograms unchanged since 2020. This could represent atelectasis and/or infection. Correlate clinically. Previously described patchy opacities in the left lower lobe and lingula have resolved. Trace right pleural effusion.Small right pleural effusion.    Rectal wall thickening. New since the previous exam. Correlate clinically for proctitis. Mild distention of the colon proximal to this. No small bowel obstruction.    < end of copied text > INFECTIOUS DISEASES FOLLOW UP    This is a follow up note for this  71 yo Male with  Anemia  s/p OHTx   interval development of abdominal distension and diarrhea- C.diff PCR detected    Interval event:  - Went for RHC and heart biopsy this afternoon  - Afebrile  - Ceftriaxone finished 10 days course yesterday  - Per surgery, no acute intervention for C.diff  - Pending morning labs  - 2 loose BMs yesterday, already had 2 loose BMs this morning. Abd same distension level as per pt.    ROS:  CONSTITUTIONAL:  No fever, good appetite  CARDIOVASCULAR:  No chest pain or palpitations  RESPIRATORY:  No dyspnea  GASTROINTESTINAL:  c/o abdominal distension  GENITOURINARY:  No dysuria  NEUROLOGIC:  No headache,     Allergies  No Known Allergies    ANTIMICROBIALS:    atovaquone Suspension 1500 daily  metroNIDAZOLE  IVPB    metroNIDAZOLE  IVPB 500 every 8 hours  posaconazole DR Tablet 300 daily  vancomycin    Solution 500 every 6 hours    OTHER MEDS:  MEDICATIONS  (STANDING):  acetaminophen   Tablet .. 650 every 6 hours PRN  everolimus (ZORTRESS) 0.5 <User Schedule>  insulin glargine Injectable (LANTUS) 14 at bedtime  insulin lispro (HumaLOG) corrective regimen sliding scale  three times a day before meals  insulin lispro Injectable (HumaLOG) 5 three times a day with meals  pantoprazole  Injectable 40 daily  pravastatin 20 at bedtime  predniSONE   Tablet 30 <User Schedule>      Vital Signs Last 24 Hrs  T(F): 98.1 (20 @ 05:00), Max: 99.1 (20 @ 19:50)    Vital Signs Last 24 Hrs  HR: 116 (20 @ 05:00) (110 - 116)  BP: 99/62 (20 @ 05:00) (99/62 - 100/66)  RR: 18 (20 @ 05:00)  SpO2: 95% (20 @ 05:00) (95% - 97%)  Wt(kg): --    PHYSICAL EXAM:  Constitutional: no acute distress, but lying in bed instead of the chair  Eyes: WALT, EOMI  Ear/Nose/Throat: no oral lesions, 	  Respiratory: clear BL  Cardiovascular: S1S2 sternotomy healed  Gastrointestinal: hyperactive BS, very distended abd, not overly tender on exam  Extremities:no e/e/c  No Lymphadenopathy  IV sites not inflammed.    Labs:                        7.2    7.18  )-----------( 279      ( 24 Aug 2020 09:00 )             24.4     08-    137  |  100  |  59<H>  ----------------------------<  109<H>  3.6   |  21<L>  |  2.74<H>    Ca    8.7      24 Aug 2020 09:00  Phos  5.3       Mg     2.8         TPro  5.8<L>  /  Alb  3.6  /  TBili  0.6  /  DBili  x   /  AST  43<H>  /  ALT  37  /  AlkPhos  59        WBC Trend:  WBC Count: 7.18 (20 @ 09:00)  WBC Count: 5.47 (20 @ 06:08)  WBC Count: 4.70 (20 @ 08:54)  WBC Count: 3.97 (20 @ 08:17)      Creatine Trend:  Creatinine, Serum: 2.74 ()  Creatinine, Serum: 2.31 ()  Creatinine, Serum: 2.28 ()  Creatinine, Serum: 1.37 ()      Liver Biochemical Testing Trend:  Alanine Aminotransferase (ALT/SGPT): 37 ()  Aspartate Aminotransferase (AST/SGOT): 43 (20 @ 10:51)  Bilirubin Total, Serum: 0.6 ()      Trend LDH  20 @ 08:51  376<H>  20 @ 08:50  390<H>  20 @ 07:26  323<H>  20 @ 07:16  266<H>  20 @ 07:53  246<H>  20 @ 07:39  247<H>  20 @ 07:42  223  20 @ 07:45  216  20 @ 07:15  237  20 @ 08:29  256<H>  20 @ 11:15  253<H>  20 @ 07:47  258<H>      Urinalysis Basic - ( 24 Aug 2020 21:27 )    Color: Yellow / Appearance: Slightly Turbid / S.018 / pH: x  Gluc: x / Ketone: Negative  / Bili: Negative / Urobili: <2 mg/dL   Blood: x / Protein: 30 mg/dL / Nitrite: Negative   Leuk Esterase: Negative / RBC: 1 /HPF / WBC 3 /HPF   Sq Epi: x / Non Sq Epi: 2 /HPF / Bacteria: Negative      MICROBIOLOGY:    C Diff by PCR Result: Detected ( @ 01:54)    C. difficile GDH &amp; toxins A/B by EIA (20 @ 18:22)    Clostridium difficile GDH Toxins A&amp;B, EIA:   Indeterminate    Clostridium difficile GDH Interpretation: This specimen is positive for C. Difficile glutamate dehydrogenase (GDH)  antigen and negative for C. Difficile Toxins A & B, by EIA.  GDH is a  highly sensitive screening marker for C. Difficile that is produced in  large amounts by all C. Difficilestrains, both toxigenic and  nontoxigenic.  Specimens that are GDH positive and toxin negative will be  tested further by PCR to detect toxin gene sequences associated with  toxin producing C. Difficle.    GI PCR Panel, Stool (collected 23 Aug 2020 02:01)  Source: .Stool Feces  Final Report:    GI PCR Results: NOT detected    Culture - Urine (collected 14 Aug 2020 13:32)  Source: .Urine Clean Catch (Midstream)  Final Report:    No growth    Culture - Blood (collected 14 Aug 2020 01:32)  Source: .Blood Blood  Final Report:    No Growth Final    Culture - Blood (collected 14 Aug 2020 01:32)  Source: .Blood Blood  Final Report:    No Growth Final    Culture - Blood (collected 13 Aug 2020 14:48)  Source: .Blood Blood-Peripheral  Final Report:    Growth in aerobic and anaerobic bottles: Klebsiella oxytoca/Raoutella    ornithinolytica    See previous culture [id number]    Culture - Blood (collected 13 Aug 2020 12:14)  Source: .Blood Blood-Peripheral  Final Report:    Growth in anaerobic bottle: Klebsiella oxytoca/Raoutella ornithinolytica    "Due to technical problems, Proteus sp. will Not be reported as part of    the BCID panel until further notice"    ***Blood Panel PCR results on this specimen are available    approximately 3 hours after the Gram stain result.***    Gram stain, PCR, and/or culture results may not always    correspond due to difference in methodologies.    ************************************************************    This PCR assay was performed using[employer].    The following targets are tested for: Enterococcus,    vancomycin resistant enterococci, Listeria monocytogenes,    coagulase negative staphylococci, S. aureus,    methicillin resistant S. aureus, Streptococcus agalactiae    (Group B), S.pneumoniae, S. pyogenes (Group A),    Acinetobacter baumannii, Enterobacter cloacae, E. coli,    Klebsiella oxytoca, K. pneumoniae, Proteus sp.,    Serratia marcescens, Haemophilus influenzae,    Neisseria meningitidis, Pseudomonas aeruginosa, Candida    albicans, C. glabrata, C krusei, C parapsilosis,    C. tropicalis and the KPC resistance gene.  Organism: Blood Culture PCR  Klebsiella oxytoca /Raoutella ornithinolytica  Organism: Klebsiella oxytoca /Raoutella ornithinolytica    Sensitivities:      -  Amikacin: S <=16      -  Ampicillin: R >16 These ampicillin results predict results for amoxicillin      -  Ampicillin/Sulbactam: I 16/8 Enterobacter, Citrobacter, and Serratia may develop resistance during prolonged therapy (3-4 days)      -  Aztreonam: S <=4      -  Cefazolin: R >16 Enterobacter, Citrobacter, and Serratia may develop resistance during prolonged therapy (3-4 days)      -  Cefepime: S <=2      -  Cefoxitin: S <=8      -  Ceftriaxone: S <=1 Enterobacter, Citrobacter, and Serratia may develop resistance during prolonged therapy      -  Ciprofloxacin: S <=0.25      -  Ertapenem: S <=0.5      -  Gentamicin: S <=2      -  Imipenem: S <=1      -  Levofloxacin: S <=0.5      -  Meropenem: S <=1      -  Piperacillin/Tazobactam: S <=8      -  Tobramycin: S <=2      -  Trimethoprim/Sulfamethoxazole: R >38      Method Type: KAMILA  Organism: Blood Culture PCR    Sensitivities:      -  Klebsiella oxytoca: Detec      Method Type: PCR    Culture - Urine (collected 13 Aug 2020 00:40)  Source: .Urine Clean Catch (Midstream)  Final Report:    >=3 organisms. Probable collection contamination.    Culture - Blood (collected 2020 16:59)  Source: .Blood Blood-Peripheral  Final Report:    No Growth Final    Culture - Blood (collected 2020 14:19)  Source: .Blood Blood-Peripheral  Final Report:    No Growth Final    Babesia microti PCR, Blood. (collected 27 May 2020 10:16)  Source: .Blood  Final Report:    Babesia microti PCR    Results: NOT detected    ABS CD4: 120 /uL (18 @ 13:00)    HIV-1 RNA Quantitative, Viral Load:   NOT DET. (18 @ 19:10)  HIV-1 Viral Load Result: NOT DET. (18 @ 19:10)  HIV-1 RNA Quantitative, Viral Load:   NOT DET. (18 @ 19:25)  HIV-1 Viral Load Result: NOT DET. (18 @ 19:25)    OTHER TESTS:  COVID-19 PCR: NotDetec (20 @ 13:14)  COVID-19 PCR: NotDetec (20 @ 20:08)      RADIOLOGY & ADDITIONAL STUDIES:    < from: CT Abdomen and Pelvis w/ Oral Cont (20 @ 13:30) >  IMPRESSION:  Right lower lobe consolidation with air bronchograms unchanged since 2020. This could represent atelectasis and/or infection. Correlate clinically. Previously described patchy opacities in the left lower lobe and lingula have resolved. Trace right pleural effusion.Small right pleural effusion.    Rectal wall thickening. New since the previous exam. Correlate clinically for proctitis. Mild distention of the colon proximal to this. No small bowel obstruction.    < end of copied text >

## 2020-08-25 NOTE — PROGRESS NOTE ADULT - ASSESSMENT
A/P: 70y M with history of heart transplant in 2/2018 admitted with autoimmune hemolytic anemia and dark stools requiring transfusion. General Surgery consulted for abdominal distention.     - no acute general surgical intervention at this time  - Patient tolerating diet and passing flatus/stools without difficulty  - thickening of rectal mucosa likely 2/2 c. diff colitis   - surgery will follow   - d/w Dr. Chin and attending     [facility]  [phone] A/P: 70y M with history of heart transplant in 2/2018 admitted with autoimmune hemolytic anemia and dark stools requiring transfusion. General Surgery consulted for abdominal distention; patient soft and non-tender this am.     - no general surgical intervention warranted at this time  - Patient tolerating diet and passing flatus/stools without difficulty  - thickening of rectal mucosa found on CT (8/23) likely 2/2 c. diff colitis, no evidence of obstruction    - surgery will follow     [facility]  [phone]

## 2020-08-25 NOTE — PROGRESS NOTE ADULT - PROBLEM SELECTOR PLAN 6
-On nifed 90mg daily; will hold as BP softer and had NORMAN -prev on nifed 90mg daily; held d/t relative hypotension

## 2020-08-25 NOTE — CONSULT NOTE ADULT - ASSESSMENT
70y old  Male with pmhx of  NICM s/p HM2 s/p OHT in 2018, autoimmune hemolytic anemia s/p steroids/rituxan, admitted for GIB c/b bacteremia, cdiff, and now NORMAN.    #NORMAN  - Etiology of NORMAN like multifactorial 2/2 infection, diarrhea, everolimus   - His baseline Scr ~1mg/dl, now increased to 2.7mg/dl  - Appears oliguric, though may not be appropriately documented  - UA with 30 protein, no WBC or RBC, Urine Na<35, Urine Cl<35  - Urine studies consistent with pre-renal picture, could have ATN as well given the relative hypotension, lowest documented BP- 102/70. Last Everolimus trough was on 8/20 and noted to be elevated at ~10  - Recommend to change IVF from NS to LR as pt already has mild acidosis noted from yesterday labs. Would give 500mL- 1L bolus of LR and start 100cc/hr. Would hold everolimus and check stat trough  - Check bladder sono to r/o urinary retention  - Monitor UOP and daily weights. Dose medications as per eGFR<15      #Azotemia  - 2/2 NORMAN and steroids, no evidence of uremia    #Metabolic Acidosis  - Labs from 8/24 with bicarb of 21, AG 16  - Likely 2/2 diarrhea  - Recommend to change NS to LR as NS will worsen the acidosis 2/2 hyperchloremia and patient already having ongoing bicarb loss 2/2 diarrhea     #Anemia  - 2/2 GIB, Hgb ~7  - Iron stores low, cannot give IV iron in setting of active infection  - pRBC transfusions per primary team 70y old  Male with pmhx of  NICM s/p HM2 s/p OHT in 2018, autoimmune hemolytic anemia s/p steroids/rituxan, admitted for GIB c/b bacteremia, cdiff, and now NORMAN.    #NORMAN  - Etiology of NORMAN like multifactorial 2/2 infection, diarrhea, everolimus   - His baseline Scr ~1mg/dl, now increased to 2.7mg/dl  - Appears oliguric, standing weights rising, 79kg-81.8kg  - UA with 30 protein, no WBC or RBC, Urine Na<35, Urine Cl<35  - Urine studies consistent with pre-renal vs. CRS picture, could have ATN as well given the relative hypotension, lowest documented BP- 102/70. Last Everolimus trough was on 8/20 and noted to be elevated at ~10  - Clinically patient has anasarca which could be 2/2 GI pathology but also suspect patient may have decreased EABV  - Agree with pursuing RHC to assess filling pressures. Would hold everolimus and check stat trough, last level noted to be ~10  - Check bladder sono to r/o urinary retention  - Monitor UOP and daily weights. Dose medications as per eGFR<15      #Azotemia  - 2/2 NORMAN and steroids, no evidence of uremia    #Metabolic Acidosis  - Labs from 8/24 with bicarb of 21, AG 16  - Likely 2/2 diarrhea  - Recommend to avoid NS, if giving IVF would prefer LR as NS will worsen the acidosis 2/2 hyperchloremia and patient already having ongoing bicarb loss 2/2 diarrhea     #Anemia  - 2/2 GIB, Hgb ~7  - Iron stores low, cannot give IV iron in setting of active infection  - pRBC transfusions per primary team

## 2020-08-25 NOTE — PROGRESS NOTE ADULT - SUBJECTIVE AND OBJECTIVE BOX
VITAL SIGNS    Telemetry:    Vital Signs Last 24 Hrs  T(C): 36.7 (20 @ 05:00), Max: 36.7 (20 @ 05:00)  T(F): 98.1 (20 @ 05:00), Max: 98.1 (20 @ 05:00)  HR: 116 (20 @ 05:00) (110 - 116)  BP: 99/62 (20 @ 05:00) (99/62 - 100/66)  RR: 18 (20 @ 05:00) (18 - 18)  SpO2: 95% (20 @ 05:00) (95% - 97%)             @ 07:01  -   @ 07:00  --------------------------------------------------------  IN: 800 mL / OUT: 250 mL / NET: 550 mL       Daily     Daily Weight in k.8 (25 Aug 2020 07:50)  Admit Wt: Drug Dosing Weight  Height (cm): 170.18 (17 Aug 2020 14:29)  Weight (kg): 75.2 (17 Aug 2020 14:29)  BMI (kg/m2): 26 (17 Aug 2020 14:29)  BSA (m2): 1.87 (17 Aug 2020 14:29)      CAPILLARY BLOOD GLUCOSE      POCT Blood Glucose.: 107 mg/dL (25 Aug 2020 07:46)  POCT Blood Glucose.: 160 mg/dL (24 Aug 2020 21:49)  POCT Blood Glucose.: 157 mg/dL (24 Aug 2020 17:13)  POCT Blood Glucose.: 119 mg/dL (24 Aug 2020 12:06)          acetaminophen   Tablet .. 650 milliGRAM(s) Oral every 6 hours PRN  atovaquone Suspension 1500 milliGRAM(s) Oral daily  cholecalciferol 1000 Unit(s) Oral daily  dextrose 5%. 1000 milliLiter(s) IV Continuous <Continuous>  everolimus (ZORTRESS) 0.5 milliGRAM(s) Oral <User Schedule>  folic acid 1 milliGRAM(s) Oral daily  insulin glargine Injectable (LANTUS) 14 Unit(s) SubCutaneous at bedtime  insulin lispro (HumaLOG) corrective regimen sliding scale   SubCutaneous three times a day before meals  insulin lispro Injectable (HumaLOG) 5 Unit(s) SubCutaneous three times a day with meals  magnesium oxide 400 milliGRAM(s) Oral daily  metroNIDAZOLE  IVPB      metroNIDAZOLE  IVPB 500 milliGRAM(s) IV Intermittent every 8 hours  omega-3-Acid Ethyl Esters 2 Gram(s) Oral two times a day  pantoprazole  Injectable 40 milliGRAM(s) IV Push daily  posaconazole DR Tablet 300 milliGRAM(s) Oral daily  pravastatin 20 milliGRAM(s) Oral at bedtime  predniSONE   Tablet 30 milliGRAM(s) Oral <User Schedule>  sodium chloride 0.9% lock flush 3 milliLiter(s) IV Push every 8 hours  vancomycin    Solution 500 milliGRAM(s) Oral every 6 hours      PHYSICAL EXAM    Subjective: "Hi.   Neurology: alert and oriented x 3, nonfocal, no gross deficits  CV : tele:  RSR  Sternal Wound :  CDI with dressing , Stable  Lungs: clear. RR easy, unlabored   Abdomen: soft, nontender, nondistended, positive bowel sounds, bowel movement   Neg N/V/D   :  pt voiding without difficulty   Extremities:   RUVALCABA; edema, neg calf tenderness.   PPP bilaterally      PW:  Chest tubes: VITAL SIGNS    Telemetry:  -120   Vital Signs Last 24 Hrs  T(C): 36.7 (20 @ 05:00), Max: 36.7 (20 @ 05:00)  T(F): 98.1 (20 @ 05:00), Max: 98.1 (20 @ 05:00)  HR: 116 (20 @ 05:00) (110 - 116)  BP: 99/62 (20 @ 05:00) (99/62 - 100/66)  RR: 18 (20 @ 05:00) (18 - 18)  SpO2: 95% (20 @ 05:00) (95% - 97%)             @ 07:01  -   @ 07:00  --------------------------------------------------------  IN: 800 mL / OUT: 250 mL / NET: 550 mL       Daily     Daily Weight in k.8 (25 Aug 2020 07:50)  Admit Wt: Drug Dosing Weight  Height (cm): 170.18 (17 Aug 2020 14:29)  Weight (kg): 75.2 (17 Aug 2020 14:29)  BMI (kg/m2): 26 (17 Aug 2020 14:29)  BSA (m2): 1.87 (17 Aug 2020 14:29)      CAPILLARY BLOOD GLUCOSE      POCT Blood Glucose.: 107 mg/dL (25 Aug 2020 07:46)  POCT Blood Glucose.: 160 mg/dL (24 Aug 2020 21:49)  POCT Blood Glucose.: 157 mg/dL (24 Aug 2020 17:13)  POCT Blood Glucose.: 119 mg/dL (24 Aug 2020 12:06)          acetaminophen   Tablet .. 650 milliGRAM(s) Oral every 6 hours PRN  atovaquone Suspension 1500 milliGRAM(s) Oral daily  cholecalciferol 1000 Unit(s) Oral daily  dextrose 5%. 1000 milliLiter(s) IV Continuous <Continuous>  everolimus (ZORTRESS) 0.5 milliGRAM(s) Oral <User Schedule>  folic acid 1 milliGRAM(s) Oral daily  insulin glargine Injectable (LANTUS) 14 Unit(s) SubCutaneous at bedtime  insulin lispro (HumaLOG) corrective regimen sliding scale   SubCutaneous three times a day before meals  insulin lispro Injectable (HumaLOG) 5 Unit(s) SubCutaneous three times a day with meals  magnesium oxide 400 milliGRAM(s) Oral daily  metroNIDAZOLE  IVPB      metroNIDAZOLE  IVPB 500 milliGRAM(s) IV Intermittent every 8 hours  omega-3-Acid Ethyl Esters 2 Gram(s) Oral two times a day  pantoprazole  Injectable 40 milliGRAM(s) IV Push daily  posaconazole DR Tablet 300 milliGRAM(s) Oral daily  pravastatin 20 milliGRAM(s) Oral at bedtime  predniSONE   Tablet 30 milliGRAM(s) Oral <User Schedule>  sodium chloride 0.9% lock flush 3 milliLiter(s) IV Push every 8 hours  vancomycin    Solution 500 milliGRAM(s) Oral every 6 hours      PHYSICAL EXAM    Subjective: "I feel swollen."   Neurology: alert and oriented x 3, nonfocal, no gross deficits  CV : tele:  RSR/ -120   Sternal Wound :  CDI NAZ - healing well   Lungs: clear. RR easy, unlabored   Abdomen: + distended, positive hyperactive bowel sounds, + diarrhea   :  pt voiding without difficulty   Extremities:   RUVALCABA; +2 generalized edema, neg calf tenderness.   PPP bilaterally

## 2020-08-25 NOTE — PROGRESS NOTE ADULT - ASSESSMENT
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 He has responded appropriately to a single blood transfusion. Of note, he recently has developed dark colored stools. Will investigate if anemia is due to GI bleeding in setting of steroid use or hemolysis and manage appropriately. Underwent UGI without bleeding source identified  Developed gram negative sepsis requiring transfer to CTU  Clinically improving    #C.diff PCR detected (8/23): severe C.diff colitis  - Overnight 8/22 developed multiple episodes of watery diarrhea  - Abdominal distension noted on exam  - GI- PCR was negative for pathogens  - Abdominal CT(8/23): Rectal wall thickening. New since the previous exam. Correlate clinically for proctitis. Mild distention of the colon proximal to this. No small bowel obstruction.  - WBC=7.18, significant NORMAN (Cr: 1.03 > 2.74), albumin=3.6 (8/23)  - Due to significant NORMAN >50% Cr increase, meets criteria of severe C.diff infection. c/w PO Vancomycin to 500mg q6h (from 125mg q6h) + IV flagyl  - Trend daily CBC, CMP (for albumin), fever curve  - Surgery following, no acute OR    #Klebsiella oxytoca bacteremia:  Meropenem (8/14-8/17)  Cefepime (8/17-20)  Ceftriaxone (8/20-)  - Growing in blood cultures 2/2 sets on 8/13  - Unclear source  - Repeat blood cultures x2 sets 8/14 no growth final  - s/p ceftriaxone until Monday 8/24 to finish a 10-day course    #OI prophylaxis-  -has been receiving high dose steroids since 5/20  -CMV viral load(8/17): neg  -Valcyte and Bactrim d/c'ed on 8/17 due to leukopenia  -Galactomann<0.5, Fungitell<31,    -c/w Atovaquone 1500mg daily for PCP ppx given on steroid   -Per hemoc, will taper prednisone every 7 days by 10mg    #Pancytopenia- on admission  -Remains with significant leukopenia- especially lymphocyte lines  -COVID PCR neg  -Tick panel neg  -would repeat chest CT scan to see status of prior RUL scarring vs. nodule once patient is stable    #Anemia- r/o GI bleeding    EGD 8/17: esophagitis, acute gastritis s/p biopsy, no ulceration  Consider PPI or H2b    Lobo Mckoy MD, PGY4   ID fellow  Pager: [phone]  After 5pm/weekends call [phone]

## 2020-08-25 NOTE — PROGRESS NOTE ADULT - PROBLEM SELECTOR PLAN 3
CARDIO RENAL CSLT CALLED today  Hold all nephrotoxin agents renal following  I & O's and daily weights  daily bmp   Hold all nephrotoxin agents

## 2020-08-25 NOTE — PROGRESS NOTE ADULT - ASSESSMENT
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 He has received a total of 3 units PRBC this admission with slow downtrend of hemoglobin. Labs were not consistent with hemolysis and he reported dark stools for which EGD/enteroscopy eventually revealed chronic gastritis and small bowel ectasias. He developed acute respiratory distress and profound sinus tachycardia on 8/13 in setting of Klebsiella bacteremia of unclear origin (preceded EGD) for which CT performed which suggests possible pneumonia. He has clinically improved with antibiotics, however he was found to also have Cdiff infxn and was started on vanc PO. Also notably has anasarca with RUE swelling and b/l LE swelling.     Review of pertinent studies  8/2020 labs: Direct Jacky +, 4.6% reticulocytes with low RI, normal bilirubin,  (stable). Haptoglobin normal. 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 He has received a total of 3 units PRBC this admission with slow downtrend of hemoglobin. Labs were not consistent with hemolysis and he reported dark stools for which EGD/enteroscopy eventually revealed chronic gastritis and small bowel ectasias. He developed acute respiratory distress and profound sinus tachycardia on 8/13 in setting of Klebsiella bacteremia of unclear origin (preceded EGD) for which CT performed which suggests possible pneumonia. He has clinically improved with antibiotics, however he was found to have worsening abdominal distention and Cdiff infxn and was started on vanc PO as well as IV flagyl. Also notably has anasarca with RUE swelling and b/l LE swelling. Developed oliguric renal failure since 8/22 possibly 2/2 relative hypotension vs abdominal compartment syndrome.     Review of pertinent studies  8/2020 labs: Direct Jacky +, 4.6% reticulocytes with low RI, normal bilirubin,  (stable). Haptoglobin normal.

## 2020-08-25 NOTE — PROGRESS NOTE ADULT - SUBJECTIVE AND OBJECTIVE BOX
Patient seen and examined at bedside.    Overnight Events: NAEO. This AM, pt denies any complaints.    Review Of Systems: No chest pain, shortness of breath, or palpitations            Current Meds:  acetaminophen   Tablet .. 650 milliGRAM(s) Oral every 6 hours PRN  atovaquone Suspension 1500 milliGRAM(s) Oral daily  cholecalciferol 1000 Unit(s) Oral daily  dextrose 5%. 1000 milliLiter(s) IV Continuous <Continuous>  everolimus (ZORTRESS) 0.5 milliGRAM(s) Oral <User Schedule>  folic acid 1 milliGRAM(s) Oral daily  insulin glargine Injectable (LANTUS) 14 Unit(s) SubCutaneous at bedtime  insulin lispro (HumaLOG) corrective regimen sliding scale   SubCutaneous three times a day before meals  insulin lispro Injectable (HumaLOG) 5 Unit(s) SubCutaneous three times a day with meals  magnesium oxide 400 milliGRAM(s) Oral daily  metroNIDAZOLE  IVPB      metroNIDAZOLE  IVPB 500 milliGRAM(s) IV Intermittent every 8 hours  omega-3-Acid Ethyl Esters 2 Gram(s) Oral two times a day  pantoprazole  Injectable 40 milliGRAM(s) IV Push daily  posaconazole DR Tablet 300 milliGRAM(s) Oral daily  pravastatin 20 milliGRAM(s) Oral at bedtime  predniSONE   Tablet 30 milliGRAM(s) Oral <User Schedule>  sodium chloride 0.9% lock flush 3 milliLiter(s) IV Push every 8 hours  vancomycin    Solution 500 milliGRAM(s) Oral every 6 hours      Vitals:  T(F): 98.1 (08-25), Max: 98.1 (08-25)  HR: 116 (08-25) (110 - 116)  BP: 99/62 (08-25) (99/62 - 100/66)  RR: 18 (08-25)  SpO2: 95% (08-25)  I&O's Summary    24 Aug 2020 07:01  -  25 Aug 2020 07:00  --------------------------------------------------------  IN: 800 mL / OUT: 250 mL / NET: 550 mL        Physical Exam:  Gen: NAD.  HEENT: NCAT. PERRLA b/l.  Neck: No JVP elev.  CV: Normal S1, S2. RRR. No MRG.  Chest: CTAB. No WRR.  Abd: +BSx4. Soft. NTND.  Ext: No LE edema.  Skin: No cyanosis.                          7.2    7.18  )-----------( 279      ( 24 Aug 2020 09:00 )             24.4     08-24    137  |  100  |  59<H>  ----------------------------<  109<H>  3.6   |  21<L>  |  2.74<H>    Ca    8.7      24 Aug 2020 09:00  Phos  5.3     08-24  Mg     2.8     08-24    TPro  5.8<L>  /  Alb  3.6  /  TBili  0.6  /  DBili  x   /  AST  43<H>  /  ALT  37  /  AlkPhos  59  08-23    Echo: < from: TTE with Doppler (w/3D Echo) (08.23.20 @ 08:12) >  1. Normal trileaflet aortic valve.  2. Mildly dilated left atrium.  LA volume index = 36 cc/m2.  3. Normal left ventricular internal dimensions and wall  thicknesses.  4. Overall normal left ventricular systolic function. Mild  basal inferior wall hypokinesis, which has been noted on  prior studies.  5. Normal right ventricular size with mildly decreased  systolic function.

## 2020-08-25 NOTE — PROGRESS NOTE ADULT - PROBLEM SELECTOR PLAN 3
-Appr hem/onc recs  -Likely 2/2 active infxn  -C/w tx for bacteremia and c. diff, as below  -C/w trend CBC  -CMV PCR 8/17 negative; please resend

## 2020-08-25 NOTE — PROGRESS NOTE ADULT - PROBLEM SELECTOR PLAN 7
-Appr ID input  -Cdiff PCR positive  -C/w vanc PO and metronidazole  -Contact precautions  -Appr gen sx input

## 2020-08-25 NOTE — PROGRESS NOTE ADULT - SUBJECTIVE AND OBJECTIVE BOX
Green Team SURGERY DAILY PROGRESS NOTE:      S:   Patient seen and examined. No acute events overnight. Pain is well controlled. States he feels pressure in abdomen but no pain. GI fxn +/+. Tolerating diet w/o N/V.       O:   Exam:  Gen: NAD. A&Ox3.  Well developed, alert and cooperative.   Resp: No additional work of breathing.   Card: RR. No peripheral edema or pallor.   Abd: Softly distended. NT to palpation, minimal to deep palpation. No rebound or guarding.   Ext: WWP. Able to move all extremities equally.    Vital Signs Last 24 Hrs  T(C): 36.7 (25 Aug 2020 05:00), Max: 36.7 (25 Aug 2020 05:00)  T(F): 98.1 (25 Aug 2020 05:00), Max: 98.1 (25 Aug 2020 05:00)  HR: 116 (25 Aug 2020 05:00) (110 - 120)  BP: 99/62 (25 Aug 2020 05:00) (99/62 - 102/66)  BP(mean): 78 (24 Aug 2020 21:11) (76 - 78)  RR: 18 (25 Aug 2020 05:00) (18 - 18)  SpO2: 95% (25 Aug 2020 05:00) (95% - 97%)      20 @ 07:  -  20 @ 07:00  --------------------------------------------------------  IN: 1095 mL / OUT: 150 mL / NET: 945 mL    20 @ 07:  -  20 @ 06:40  --------------------------------------------------------  IN: 800 mL / OUT: 250 mL / NET: 550 mL        LABS:                        7.2    7.18  )-----------( 279      ( 24 Aug 2020 09:00 )             24.4     08-24    137  |  100  |  59<H>  ----------------------------<  109<H>  3.6   |  21<L>  |  2.74<H>    Ca    8.7      24 Aug 2020 09:00  Phos  5.3     08-24  Mg     2.8     08-24    TPro  5.8<L>  /  Alb  3.6  /  TBili  0.6  /  DBili  x   /  AST  43<H>  /  ALT  37  /  AlkPhos  59  08      Urinalysis Basic - ( 24 Aug 2020 21:27 )    Color: Yellow / Appearance: Slightly Turbid / S.018 / pH: x  Gluc: x / Ketone: Negative  / Bili: Negative / Urobili: <2 mg/dL   Blood: x / Protein: 30 mg/dL / Nitrite: Negative   Leuk Esterase: Negative / RBC: 1 /HPF / WBC 3 /HPF   Sq Epi: x / Non Sq Epi: 2 /HPF / Bacteria: Negative

## 2020-08-25 NOTE — PROGRESS NOTE ADULT - PROBLEM SELECTOR PLAN 1
-C/w statin for TCAD ppx  -ASA on hold given GIB  -C/w everolimus 0.5mg PO BID; goal 8-10.  -Steroid taper as written   -C/w posiconazole for ppx; Bactrim and Valcyte on hold given leukopenia  -Off Cellcept while on high dose steroids and now Cdiff infxn  -Arrange for RHC/EMB today -C/w statin for TCAD ppx  -ASA on hold given GIB  -C/w everolimus 0.5mg PO BID; goal 8-10. check trough  -Steroid taper as written   -C/w posiconazole for ppx; Bactrim and Valcyte on hold given leukopenia  -Off Cellcept while on high dose steroids and now Cdiff infxn  -RHC/Biopsy done today; filling pressures elevated RA 13, RV 48/14, PA 49/23/32, PCWP 20, CO/CI 8.1/4.2 MAP 83

## 2020-08-25 NOTE — CONSULT NOTE ADULT - SUBJECTIVE AND OBJECTIVE BOX
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 Patient was treated w/ prednisone and Rituximab. Tacro was discontinued and patient was also transitioned to everolimus. This time presented to [facility] on 8/11 for black tarry stools, low Hgb, initially concerning for hemolytic anemia but found to have GIB. Endoscopic evaluation possible duodenal angioectasisas/erosions, and 1 small bowel vascular lesion possible erosion vs angiectasia. Hospital course has been complicated by klebsiella bacteremia (CTX 2g QD till 8/24). Overnight on 8/22 patient developed diarrhea, found to be cdiff positive, started on flagyl and PO vancomycin. His baseline Scr is noted to be around ~1mg/dl, on 8/22 it rakan to 1.3mg/dl and has since continued to worsen, yesterday Scr was 2.7mg/dl. Nephrology consulted for NORMAN management.         PAST HISTORY  --------------------------------------------------------------------------------  PAST MEDICAL & SURGICAL HISTORY:  H/O hemolytic anemia  H/O autoimmune hemolytic anemia  Knee pain, right  HLD (hyperlipidemia)  Former smoker  DVT of upper extremity (deep vein thrombosis)  Hepatitis C virus  GIB (gastrointestinal bleeding)  Ventricular fibrillation: s/p AICD  PAF (paroxysmal atrial fibrillation): on xarelto  Non-Ischemic Cardiomyopathy  SVT (Supraventricular Tachycardia)  HTN  CHF (Congestive Heart Failure)  S/P right heart catheterization: biopsy multiple  H/O heart transplant: 2/2018  Status post left hip replacement  History of Prior Ablation Treatment: for afib  AICD (Automatic Cardioverter/Defibrillator) Present: [employer] with 1 [employer] lead4/1/09- explanted and replaced with Medtronic 2 leads on 9/2/09    FAMILY HISTORY:  No pertinent family history in first degree relatives    PAST SOCIAL HISTORY: No ETOH or tobacco use    ALLERGIES & MEDICATIONS  --------------------------------------------------------------------------------  Allergies    No Known Allergies    Intolerances      Standing Inpatient Medications  atovaquone Suspension 1500 milliGRAM(s) Oral daily  cholecalciferol 1000 Unit(s) Oral daily  dextrose 5%. 1000 milliLiter(s) IV Continuous <Continuous>  everolimus (ZORTRESS) 0.5 milliGRAM(s) Oral <User Schedule>  folic acid 1 milliGRAM(s) Oral daily  insulin glargine Injectable (LANTUS) 14 Unit(s) SubCutaneous at bedtime  insulin lispro (HumaLOG) corrective regimen sliding scale   SubCutaneous three times a day before meals  insulin lispro Injectable (HumaLOG) 5 Unit(s) SubCutaneous three times a day with meals  magnesium oxide 400 milliGRAM(s) Oral daily  metroNIDAZOLE  IVPB      metroNIDAZOLE  IVPB 500 milliGRAM(s) IV Intermittent every 8 hours  omega-3-Acid Ethyl Esters 2 Gram(s) Oral two times a day  pantoprazole  Injectable 40 milliGRAM(s) IV Push daily  posaconazole DR Tablet 300 milliGRAM(s) Oral daily  pravastatin 20 milliGRAM(s) Oral at bedtime  predniSONE   Tablet 30 milliGRAM(s) Oral <User Schedule>  sodium chloride 0.9% lock flush 3 milliLiter(s) IV Push every 8 hours  sodium chloride 0.9%. 1000 milliLiter(s) IV Continuous <Continuous>  vancomycin    Solution 500 milliGRAM(s) Oral every 6 hours    PRN Inpatient Medications  acetaminophen   Tablet .. 650 milliGRAM(s) Oral every 6 hours PRN      REVIEW OF SYSTEMS  --------------------------------------------------------------------------------  Gen: No fevers/chills  Head/Eyes/Ears/Mouth: No headache; Normal hearing; Normal vision    Respiratory: No dyspnea, cough  CV: No chest pain  GI: + abdominal pain/diarrhea   : No increased frequency, dysuria  MSK: No joint pain/swelling; no edema    All other systems were reviewed and are negative, except as noted.    VITALS/PHYSICAL EXAM  --------------------------------------------------------------------------------  T(C): 36.7 (08-25-20 @ 05:00), Max: 36.7 (08-25-20 @ 05:00)  HR: 116 (08-25-20 @ 05:00) (110 - 120)  BP: 99/62 (08-25-20 @ 05:00) (99/62 - 102/66)  RR: 18 (08-25-20 @ 05:00) (18 - 18)  SpO2: 95% (08-25-20 @ 05:00) (95% - 97%)  Wt(kg): --        08-23-20 @ 07:01  -  08-24-20 @ 07:00  --------------------------------------------------------  IN: 1095 mL / OUT: 150 mL / NET: 945 mL    08-24-20 @ 07:01  -  08-25-20 @ 06:54  --------------------------------------------------------  IN: 800 mL / OUT: 250 mL / NET: 550 mL      Physical Exam:    	Gen: NAD, well-appearing  	HEENT: Anicteric   	Pulm: CTA B/L  	CV: RRR, S1S2; no rub  	Abd: +BS, soft, nontender/nondistended       	: No suprapubic tenderness  	MSK: Warm, no clubbing, intact strength; no edema  	Neuro: No focal deficits, AOX3, no asterixis       	Vascular Access:      LABS/STUDIES  --------------------------------------------------------------------------------              7.2    7.18  >-----------<  279      [08-24-20 @ 09:00]              24.4     137  |  100  |  59  ----------------------------<  109      [08-24-20 @ 09:00]  3.6   |  21  |  2.74        Ca     8.7     [08-24-20 @ 09:00]      Mg     2.8     [08-24-20 @ 09:00]      Phos  5.3     [08-24-20 @ 09:00]    TPro  5.8  /  Alb  3.6  /  TBili  0.6  /  DBili  x   /  AST  43  /  ALT  37  /  AlkPhos  59  [08-23-20 @ 10:51]          Creatinine Trend:  SCr 2.74 [08-24 @ 09:00]  SCr 2.31 [08-23 @ 10:51]  SCr 2.28 [08-23 @ 06:08]  SCr 1.37 [08-22 @ 08:54]  SCr 1.18 [08-21 @ 08:17]    Urinalysis - [08-24-20 @ 21:27]      Color Yellow / Appearance Slightly Turbid / SG 1.018 / pH 5.5      Gluc Negative / Ketone Negative  / Bili Negative / Urobili <2 mg/dL       Blood Negative / Protein 30 mg/dL / Leuk Est Negative / Nitrite Negative      RBC 1 / WBC 3 / Hyaline 2 / Gran  / Sq Epi  / Non Sq Epi 2 / Bacteria Negative    Urine Creatinine 188      [08-24-20 @ 17:29]  Urine Sodium <35      [08-24-20 @ 17:29]  Urine Chloride <35      [08-24-20 @ 17:29]    Iron 36, TIBC 218, %sat 16      [08-15-20 @ 21:08]  Ferritin 764      [08-15-20 @ 21:12]  Vitamin D (25OH) 33.5      [04-30-20 @ 09:06]  HbA1c 4.7      [11-07-18 @ 23:18]  TSH 1.22      [08-12-20 @ 00:18]    HBsAb Nonreact      [05-15-17 @ 15:40]  HBsAg Nonreact      [04-25-18 @ 15:20]  HBcAb Nonreact      [04-25-18 @ 15:20]  HCV 0.21, Nonreact      [04-25-18 @ 15:20]  HIV Nonreact      [05-02-20 @ 11:28]    MIGUELINA: titer Negative, pattern --      [05-23-20 @ 12:48]  C3 Complement 135      [03-19-18 @ 15:13]  C4 Complement 37      [03-19-18 @ 15:13]  Rheumatoid Factor <10      [05-23-20 @ 12:21]  ANCA: cANCA Negative, pANCA Negative, atypical ANCA Indeterminate      [05-23-20 @ 12:48]  Free Light Chains: kappa 4.36, lambda 5.71, ratio = 0.76      [03-19 @ 15:13]  Immunofixation Serum:   No Monoclonal Band Identified      [06-15-18 @ 09:48]  SPEP Interpretation: Normal Electrophoresis Pattern      [06-15-18 @ 09:35]  Cryoglobulin: Negative      [03-19-18 @ 15:13] [facility] DIVISION OF KIDNEY DISEASES AND HYPERTENSION -- INITIAL CONSULT NOTE  --------------------------------------------------------------------------------  Ritchie Sykes   Nephrology Fellow  Pager NS: [phone]/ [facility]: [phone]  (After 5 pm or on weekends please page the on-call fellow)    HPI: Patient is a 70y old  Male with pmhx of  NICM s/p HM2 s/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 Patient was treated w/ prednisone and Rituximab. Tacro was discontinued and patient was also transitioned to everolimus. This time presented to [facility] on 8/11 for black tarry stools, low Hgb, initially concerning for hemolytic anemia but found to have GIB. Endoscopic evaluation possible duodenal angioectasisas/erosions, and 1 small bowel vascular lesion possible erosion vs angiectasia. Hospital course has been complicated by klebsiella bacteremia (CTX 2g QD till 8/24). Overnight on 8/22 patient developed diarrhea, found to be cdiff positive, started on flagyl and PO vancomycin. His baseline Scr is noted to be around ~1mg/dl, on 8/22 it rakan to 1.3mg/dl and has since continued to worsen, yesterday Scr was 2.7mg/dl. Nephrology consulted for NORMAN management.         PAST HISTORY  --------------------------------------------------------------------------------  PAST MEDICAL & SURGICAL HISTORY:  H/O hemolytic anemia  H/O autoimmune hemolytic anemia  Knee pain, right  HLD (hyperlipidemia)  Former smoker  DVT of upper extremity (deep vein thrombosis)  Hepatitis C virus  GIB (gastrointestinal bleeding)  Ventricular fibrillation: s/p AICD  PAF (paroxysmal atrial fibrillation): on xarelto  Non-Ischemic Cardiomyopathy  SVT (Supraventricular Tachycardia)  HTN  CHF (Congestive Heart Failure)  S/P right heart catheterization: biopsy multiple  H/O heart transplant: 2/2018  Status post left hip replacement  History of Prior Ablation Treatment: for afib  AICD (Automatic Cardioverter/Defibrillator) Present: [employer] with 1 [employer] lead4/1/09- explanted and replaced with Medtronic 2 leads on 9/2/09    FAMILY HISTORY:  No pertinent family history in first degree relatives    PAST SOCIAL HISTORY: No ETOH or tobacco use    ALLERGIES & MEDICATIONS  --------------------------------------------------------------------------------  Allergies    No Known Allergies    Intolerances      Standing Inpatient Medications  atovaquone Suspension 1500 milliGRAM(s) Oral daily  cholecalciferol 1000 Unit(s) Oral daily  dextrose 5%. 1000 milliLiter(s) IV Continuous <Continuous>  everolimus (ZORTRESS) 0.5 milliGRAM(s) Oral <User Schedule>  folic acid 1 milliGRAM(s) Oral daily  insulin glargine Injectable (LANTUS) 14 Unit(s) SubCutaneous at bedtime  insulin lispro (HumaLOG) corrective regimen sliding scale   SubCutaneous three times a day before meals  insulin lispro Injectable (HumaLOG) 5 Unit(s) SubCutaneous three times a day with meals  magnesium oxide 400 milliGRAM(s) Oral daily  metroNIDAZOLE  IVPB      metroNIDAZOLE  IVPB 500 milliGRAM(s) IV Intermittent every 8 hours  omega-3-Acid Ethyl Esters 2 Gram(s) Oral two times a day  pantoprazole  Injectable 40 milliGRAM(s) IV Push daily  posaconazole DR Tablet 300 milliGRAM(s) Oral daily  pravastatin 20 milliGRAM(s) Oral at bedtime  predniSONE   Tablet 30 milliGRAM(s) Oral <User Schedule>  sodium chloride 0.9% lock flush 3 milliLiter(s) IV Push every 8 hours  sodium chloride 0.9%. 1000 milliLiter(s) IV Continuous <Continuous>  vancomycin    Solution 500 milliGRAM(s) Oral every 6 hours    PRN Inpatient Medications  acetaminophen   Tablet .. 650 milliGRAM(s) Oral every 6 hours PRN      REVIEW OF SYSTEMS  --------------------------------------------------------------------------------  Gen: No fevers/chills  Head/Eyes/Ears/Mouth: No headache; Normal hearing; Normal vision    Respiratory: No dyspnea, cough  CV: No chest pain  GI: + abdominal pain/diarrhea   : No increased frequency, dysuria  MSK: No joint pain/swelling; no edema    All other systems were reviewed and are negative, except as noted.    VITALS/PHYSICAL EXAM  --------------------------------------------------------------------------------  T(C): 36.7 (08-25-20 @ 05:00), Max: 36.7 (08-25-20 @ 05:00)  HR: 116 (08-25-20 @ 05:00) (110 - 120)  BP: 99/62 (08-25-20 @ 05:00) (99/62 - 102/66)  RR: 18 (08-25-20 @ 05:00) (18 - 18)  SpO2: 95% (08-25-20 @ 05:00) (95% - 97%)  Wt(kg): --        08-23-20 @ 07:01  -  08-24-20 @ 07:00  --------------------------------------------------------  IN: 1095 mL / OUT: 150 mL / NET: 945 mL    08-24-20 @ 07:01  -  08-25-20 @ 06:54  --------------------------------------------------------  IN: 800 mL / OUT: 250 mL / NET: 550 mL      Physical Exam:    	Gen: NAD, well-appearing  	HEENT: Anicteric, + JVP   	Pulm: Coarse BS  	CV: RRR, S1S2; no rub  	Abd: +BS, soft, mild TPP/ distended       	: No suprapubic tenderness  	MSK: Warm, Anasarca appreciated  	Neuro: No focal deficits, AOX3, no asterixis       	Skin: no visible rashes  	Psych: Appropriate Affect  	Vascular Access: None      LABS/STUDIES  --------------------------------------------------------------------------------              7.2    7.18  >-----------<  279      [08-24-20 @ 09:00]              24.4     137  |  100  |  59  ----------------------------<  109      [08-24-20 @ 09:00]  3.6   |  21  |  2.74        Ca     8.7     [08-24-20 @ 09:00]      Mg     2.8     [08-24-20 @ 09:00]      Phos  5.3     [08-24-20 @ 09:00]    TPro  5.8  /  Alb  3.6  /  TBili  0.6  /  DBili  x   /  AST  43  /  ALT  37  /  AlkPhos  59  [08-23-20 @ 10:51]          Creatinine Trend:  SCr 2.74 [08-24 @ 09:00]  SCr 2.31 [08-23 @ 10:51]  SCr 2.28 [08-23 @ 06:08]  SCr 1.37 [08-22 @ 08:54]  SCr 1.18 [08-21 @ 08:17]    Urinalysis - [08-24-20 @ 21:27]      Color Yellow / Appearance Slightly Turbid / SG 1.018 / pH 5.5      Gluc Negative / Ketone Negative  / Bili Negative / Urobili <2 mg/dL       Blood Negative / Protein 30 mg/dL / Leuk Est Negative / Nitrite Negative      RBC 1 / WBC 3 / Hyaline 2 / Gran  / Sq Epi  / Non Sq Epi 2 / Bacteria Negative    Urine Creatinine 188      [08-24-20 @ 17:29]  Urine Sodium <35      [08-24-20 @ 17:29]  Urine Chloride <35      [08-24-20 @ 17:29]    Iron 36, TIBC 218, %sat 16      [08-15-20 @ 21:08]  Ferritin 764      [08-15-20 @ 21:12]  Vitamin D (25OH) 33.5      [04-30-20 @ 09:06]  HbA1c 4.7      [11-07-18 @ 23:18]  TSH 1.22      [08-12-20 @ 00:18]    HBsAb Nonreact      [05-15-17 @ 15:40]  HBsAg Nonreact      [04-25-18 @ 15:20]  HBcAb Nonreact      [04-25-18 @ 15:20]  HCV 0.21, Nonreact      [04-25-18 @ 15:20]  HIV Nonreact      [05-02-20 @ 11:28]    MIGUELINA: titer Negative, pattern --      [05-23-20 @ 12:48]  C3 Complement 135      [03-19-18 @ 15:13]  C4 Complement 37      [03-19-18 @ 15:13]  Rheumatoid Factor <10      [05-23-20 @ 12:21]  ANCA: cANCA Negative, pANCA Negative, atypical ANCA Indeterminate      [05-23-20 @ 12:48]  Free Light Chains: kappa 4.36, lambda 5.71, ratio = 0.76      [03-19 @ 15:13]  Immunofixation Serum:   No Monoclonal Band Identified      [06-15-18 @ 09:48]  SPEP Interpretation: Normal Electrophoresis Pattern      [06-15-18 @ 09:35]  Cryoglobulin: Negative      [03-19-18 @ 15:13] [facility] DIVISION OF KIDNEY DISEASES AND HYPERTENSION -- INITIAL CONSULT NOTE  --------------------------------------------------------------------------------  Ritchie Sykes   Nephrology Fellow  Pager NS: [phone]/ [facility]: [phone]  (After 5 pm or on weekends please page the on-call fellow)    HPI: Patient is a 70y old  Male with pmhx of  NICM s/p HM2 s/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 Patient was treated w/ prednisone and Rituximab. Tacro was discontinued and patient was also transitioned to everolimus. This time presented to [facility] on 8/11 for black tarry stools, low Hgb, initially concerning for hemolytic anemia but found to have GIB. Endoscopic evaluation possible duodenal angioectasisas/erosions, and 1 small bowel vascular lesion possible erosion vs angiectasia. Hospital course has been complicated by klebsiella bacteremia (CTX 2g QD till 8/24). Overnight on 8/22 patient developed diarrhea, found to be cdiff positive, started on flagyl and PO vancomycin. His baseline Scr is noted to be around ~1mg/dl, on 8/22 it rakan to 1.3mg/dl and has since continued to worsen, yesterday Scr was 2.7mg/dl. Nephrology consulted for NORMAN management.         PAST HISTORY  --------------------------------------------------------------------------------  PAST MEDICAL & SURGICAL HISTORY:  H/O hemolytic anemia  H/O autoimmune hemolytic anemia  Knee pain, right  HLD (hyperlipidemia)  Former smoker  DVT of upper extremity (deep vein thrombosis)  Hepatitis C virus  GIB (gastrointestinal bleeding)  Ventricular fibrillation: s/p AICD  PAF (paroxysmal atrial fibrillation): on xarelto  Non-Ischemic Cardiomyopathy  SVT (Supraventricular Tachycardia)  HTN  CHF (Congestive Heart Failure)  S/P right heart catheterization: biopsy multiple  H/O heart transplant: 2/2018  Status post left hip replacement  History of Prior Ablation Treatment: for afib  AICD (Automatic Cardioverter/Defibrillator) Present: [employer] with 1 [employer] lead4/1/09- explanted and replaced with Medtronic 2 leads on 9/2/09    FAMILY HISTORY:  No pertinent family history in first degree relatives    PAST SOCIAL HISTORY: No ETOH or tobacco use    ALLERGIES & MEDICATIONS  --------------------------------------------------------------------------------  Allergies    No Known Allergies    Intolerances      Standing Inpatient Medications  atovaquone Suspension 1500 milliGRAM(s) Oral daily  cholecalciferol 1000 Unit(s) Oral daily  dextrose 5%. 1000 milliLiter(s) IV Continuous <Continuous>  everolimus (ZORTRESS) 0.5 milliGRAM(s) Oral <User Schedule>  folic acid 1 milliGRAM(s) Oral daily  insulin glargine Injectable (LANTUS) 14 Unit(s) SubCutaneous at bedtime  insulin lispro (HumaLOG) corrective regimen sliding scale   SubCutaneous three times a day before meals  insulin lispro Injectable (HumaLOG) 5 Unit(s) SubCutaneous three times a day with meals  magnesium oxide 400 milliGRAM(s) Oral daily  metroNIDAZOLE  IVPB      metroNIDAZOLE  IVPB 500 milliGRAM(s) IV Intermittent every 8 hours  omega-3-Acid Ethyl Esters 2 Gram(s) Oral two times a day  pantoprazole  Injectable 40 milliGRAM(s) IV Push daily  posaconazole DR Tablet 300 milliGRAM(s) Oral daily  pravastatin 20 milliGRAM(s) Oral at bedtime  predniSONE   Tablet 30 milliGRAM(s) Oral <User Schedule>  sodium chloride 0.9% lock flush 3 milliLiter(s) IV Push every 8 hours  sodium chloride 0.9%. 1000 milliLiter(s) IV Continuous <Continuous>  vancomycin    Solution 500 milliGRAM(s) Oral every 6 hours    PRN Inpatient Medications  acetaminophen   Tablet .. 650 milliGRAM(s) Oral every 6 hours PRN      REVIEW OF SYSTEMS  --------------------------------------------------------------------------------  Gen: No fevers/chills  Head/Eyes/Ears/Mouth: No headache; Normal hearing; Normal vision    Respiratory: No dyspnea, cough  CV: No chest pain  GI: + abdominal pain/diarrhea   : No increased frequency, dysuria  MSK: No joint pain/swelling; + edema    All other systems were reviewed and are negative, except as noted.    VITALS/PHYSICAL EXAM  --------------------------------------------------------------------------------  T(C): 36.7 (08-25-20 @ 05:00), Max: 36.7 (08-25-20 @ 05:00)  HR: 116 (08-25-20 @ 05:00) (110 - 120)  BP: 99/62 (08-25-20 @ 05:00) (99/62 - 102/66)  RR: 18 (08-25-20 @ 05:00) (18 - 18)  SpO2: 95% (08-25-20 @ 05:00) (95% - 97%)  Wt(kg): --        08-23-20 @ 07:01  -  08-24-20 @ 07:00  --------------------------------------------------------  IN: 1095 mL / OUT: 150 mL / NET: 945 mL    08-24-20 @ 07:01  -  08-25-20 @ 06:54  --------------------------------------------------------  IN: 800 mL / OUT: 250 mL / NET: 550 mL      Physical Exam:    	Gen: NAD, well-appearing  	HEENT: Anicteric, + JVP   	Pulm: Coarse BS B/L  	CV: Tachycardic   	Abd: +BS, soft, mild TPP/ distended       	: No suprapubic tenderness  	MSK: Warm, Anasarca appreciated  	Neuro: No focal deficits, AOX3, no asterixis       	Skin: no visible rashes  	Psych: Appropriate Affect  	Vascular Access: None      LABS/STUDIES  --------------------------------------------------------------------------------              7.2    7.18  >-----------<  279      [08-24-20 @ 09:00]              24.4     137  |  100  |  59  ----------------------------<  109      [08-24-20 @ 09:00]  3.6   |  21  |  2.74        Ca     8.7     [08-24-20 @ 09:00]      Mg     2.8     [08-24-20 @ 09:00]      Phos  5.3     [08-24-20 @ 09:00]    TPro  5.8  /  Alb  3.6  /  TBili  0.6  /  DBili  x   /  AST  43  /  ALT  37  /  AlkPhos  59  [08-23-20 @ 10:51]          Creatinine Trend:  SCr 2.74 [08-24 @ 09:00]  SCr 2.31 [08-23 @ 10:51]  SCr 2.28 [08-23 @ 06:08]  SCr 1.37 [08-22 @ 08:54]  SCr 1.18 [08-21 @ 08:17]    Urinalysis - [08-24-20 @ 21:27]      Color Yellow / Appearance Slightly Turbid / SG 1.018 / pH 5.5      Gluc Negative / Ketone Negative  / Bili Negative / Urobili <2 mg/dL       Blood Negative / Protein 30 mg/dL / Leuk Est Negative / Nitrite Negative      RBC 1 / WBC 3 / Hyaline 2 / Gran  / Sq Epi  / Non Sq Epi 2 / Bacteria Negative    Urine Creatinine 188      [08-24-20 @ 17:29]  Urine Sodium <35      [08-24-20 @ 17:29]  Urine Chloride <35      [08-24-20 @ 17:29]    Iron 36, TIBC 218, %sat 16      [08-15-20 @ 21:08]  Ferritin 764      [08-15-20 @ 21:12]  Vitamin D (25OH) 33.5      [04-30-20 @ 09:06]  HbA1c 4.7      [11-07-18 @ 23:18]  TSH 1.22      [08-12-20 @ 00:18]    HBsAb Nonreact      [05-15-17 @ 15:40]  HBsAg Nonreact      [04-25-18 @ 15:20]  HBcAb Nonreact      [04-25-18 @ 15:20]  HCV 0.21, Nonreact      [04-25-18 @ 15:20]  HIV Nonreact      [05-02-20 @ 11:28]    MIGUELINA: titer Negative, pattern --      [05-23-20 @ 12:48]  C3 Complement 135      [03-19-18 @ 15:13]  C4 Complement 37      [03-19-18 @ 15:13]  Rheumatoid Factor <10      [05-23-20 @ 12:21]  ANCA: cANCA Negative, pANCA Negative, atypical ANCA Indeterminate      [05-23-20 @ 12:48]  Free Light Chains: kappa 4.36, lambda 5.71, ratio = 0.76      [03-19 @ 15:13]  Immunofixation Serum:   No Monoclonal Band Identified      [06-15-18 @ 09:48]  SPEP Interpretation: Normal Electrophoresis Pattern      [06-15-18 @ 09:35]  Cryoglobulin: Negative      [03-19-18 @ 15:13]

## 2020-08-25 NOTE — PROCEDURE NOTE - NSICDXPROCEDURE_GEN_ALL_CORE_FT
PROCEDURES:  Insertion, central venous catheter without tunnel, age 5 years or older 25-Aug-2020 18:28:23  Grayson Jimenez
Patient

## 2020-08-25 NOTE — PROGRESS NOTE ADULT - PROBLEM SELECTOR PLAN 8
-Elev legs  -Pt has weeping wound of L groin; please obtain L groin US to r/o seroma -Elev legs  -Pt has weeping wound of L groin; LE doppler negative

## 2020-08-26 LAB
ALBUMIN SERPL ELPH-MCNC: 3.3 G/DL — SIGNIFICANT CHANGE UP (ref 3.3–5)
ALBUMIN SERPL ELPH-MCNC: 3.4 G/DL — SIGNIFICANT CHANGE UP (ref 3.3–5)
ALP SERPL-CCNC: 63 U/L — SIGNIFICANT CHANGE UP (ref 40–120)
ALP SERPL-CCNC: 69 U/L — SIGNIFICANT CHANGE UP (ref 40–120)
ALT FLD-CCNC: 26 U/L — SIGNIFICANT CHANGE UP (ref 10–45)
ALT FLD-CCNC: 27 U/L — SIGNIFICANT CHANGE UP (ref 10–45)
ANION GAP SERPL CALC-SCNC: 18 MMOL/L — HIGH (ref 5–17)
ANION GAP SERPL CALC-SCNC: 22 MMOL/L — HIGH (ref 5–17)
ANISOCYTOSIS BLD QL: SLIGHT — SIGNIFICANT CHANGE UP
AST SERPL-CCNC: 34 U/L — SIGNIFICANT CHANGE UP (ref 10–40)
AST SERPL-CCNC: 40 U/L — SIGNIFICANT CHANGE UP (ref 10–40)
BASOPHILS # BLD AUTO: 0 K/UL — SIGNIFICANT CHANGE UP (ref 0–0.2)
BASOPHILS NFR BLD AUTO: 0 % — SIGNIFICANT CHANGE UP (ref 0–2)
BILIRUB SERPL-MCNC: 0.8 MG/DL — SIGNIFICANT CHANGE UP (ref 0.2–1.2)
BILIRUB SERPL-MCNC: 0.9 MG/DL — SIGNIFICANT CHANGE UP (ref 0.2–1.2)
BLD GP AB SCN SERPL QL: NEGATIVE — SIGNIFICANT CHANGE UP
BUN SERPL-MCNC: 72 MG/DL — HIGH (ref 7–23)
BUN SERPL-MCNC: 74 MG/DL — HIGH (ref 7–23)
CALCIUM SERPL-MCNC: 8.3 MG/DL — LOW (ref 8.4–10.5)
CALCIUM SERPL-MCNC: 8.7 MG/DL — SIGNIFICANT CHANGE UP (ref 8.4–10.5)
CHLORIDE SERPL-SCNC: 98 MMOL/L — SIGNIFICANT CHANGE UP (ref 96–108)
CHLORIDE SERPL-SCNC: 99 MMOL/L — SIGNIFICANT CHANGE UP (ref 96–108)
CO2 SERPL-SCNC: 16 MMOL/L — LOW (ref 22–31)
CO2 SERPL-SCNC: 18 MMOL/L — LOW (ref 22–31)
CREAT SERPL-MCNC: 2.92 MG/DL — HIGH (ref 0.5–1.3)
CREAT SERPL-MCNC: 2.96 MG/DL — HIGH (ref 0.5–1.3)
D DIMER BLD IA.RAPID-MCNC: 568 NG/ML DDU — HIGH
ELLIPTOCYTES BLD QL SMEAR: SLIGHT — SIGNIFICANT CHANGE UP
EOSINOPHIL # BLD AUTO: 0 K/UL — SIGNIFICANT CHANGE UP (ref 0–0.5)
EOSINOPHIL NFR BLD AUTO: 0 % — SIGNIFICANT CHANGE UP (ref 0–6)
GAS PNL BLDA: SIGNIFICANT CHANGE UP
GAS PNL BLDA: SIGNIFICANT CHANGE UP
GIANT PLATELETS BLD QL SMEAR: PRESENT — SIGNIFICANT CHANGE UP
GLUCOSE BLDC GLUCOMTR-MCNC: 129 MG/DL — HIGH (ref 70–99)
GLUCOSE BLDC GLUCOMTR-MCNC: 137 MG/DL — HIGH (ref 70–99)
GLUCOSE BLDC GLUCOMTR-MCNC: 78 MG/DL — SIGNIFICANT CHANGE UP (ref 70–99)
GLUCOSE SERPL-MCNC: 165 MG/DL — HIGH (ref 70–99)
GLUCOSE SERPL-MCNC: 66 MG/DL — LOW (ref 70–99)
HAPTOGLOB SERPL-MCNC: 189 MG/DL — SIGNIFICANT CHANGE UP (ref 34–200)
HCT VFR BLD CALC: 29.9 % — LOW (ref 39–50)
HCT VFR BLD CALC: 31.8 % — LOW (ref 39–50)
HGB BLD-MCNC: 10.4 G/DL — LOW (ref 13–17)
HGB BLD-MCNC: 9.6 G/DL — LOW (ref 13–17)
LDH SERPL L TO P-CCNC: 550 U/L — HIGH (ref 50–242)
LYMPHOCYTES # BLD AUTO: 0.15 K/UL — LOW (ref 1–3.3)
LYMPHOCYTES # BLD AUTO: 1.8 % — LOW (ref 13–44)
MACROCYTES BLD QL: SLIGHT — SIGNIFICANT CHANGE UP
MAGNESIUM SERPL-MCNC: 3 MG/DL — HIGH (ref 1.6–2.6)
MAGNESIUM SERPL-MCNC: 3 MG/DL — HIGH (ref 1.6–2.6)
MANUAL SMEAR VERIFICATION: SIGNIFICANT CHANGE UP
MCHC RBC-ENTMCNC: 29.7 PG — SIGNIFICANT CHANGE UP (ref 27–34)
MCHC RBC-ENTMCNC: 29.8 PG — SIGNIFICANT CHANGE UP (ref 27–34)
MCHC RBC-ENTMCNC: 32.1 GM/DL — SIGNIFICANT CHANGE UP (ref 32–36)
MCHC RBC-ENTMCNC: 32.7 GM/DL — SIGNIFICANT CHANGE UP (ref 32–36)
MCV RBC AUTO: 90.9 FL — SIGNIFICANT CHANGE UP (ref 80–100)
MCV RBC AUTO: 92.9 FL — SIGNIFICANT CHANGE UP (ref 80–100)
MICROCYTES BLD QL: SLIGHT — SIGNIFICANT CHANGE UP
MONOCYTES # BLD AUTO: 0.66 K/UL — SIGNIFICANT CHANGE UP (ref 0–0.9)
MONOCYTES NFR BLD AUTO: 7.9 % — SIGNIFICANT CHANGE UP (ref 2–14)
MYELOCYTES NFR BLD: 1.8 % — HIGH (ref 0–0)
NEUTROPHILS # BLD AUTO: 7.38 K/UL — SIGNIFICANT CHANGE UP (ref 1.8–7.4)
NEUTROPHILS NFR BLD AUTO: 82.3 % — HIGH (ref 43–77)
NEUTS BAND # BLD: 6.2 % — SIGNIFICANT CHANGE UP (ref 0–8)
NRBC # BLD: 0 /100 WBCS — SIGNIFICANT CHANGE UP (ref 0–0)
NRBC # BLD: 0 /100 WBCS — SIGNIFICANT CHANGE UP (ref 0–0)
OVALOCYTES BLD QL SMEAR: SLIGHT — SIGNIFICANT CHANGE UP
PHOSPHATE SERPL-MCNC: 5.5 MG/DL — HIGH (ref 2.5–4.5)
PHOSPHATE SERPL-MCNC: 6.5 MG/DL — HIGH (ref 2.5–4.5)
PLAT MORPH BLD: NORMAL — SIGNIFICANT CHANGE UP
PLATELET # BLD AUTO: 234 K/UL — SIGNIFICANT CHANGE UP (ref 150–400)
PLATELET # BLD AUTO: 247 K/UL — SIGNIFICANT CHANGE UP (ref 150–400)
POIKILOCYTOSIS BLD QL AUTO: SLIGHT — SIGNIFICANT CHANGE UP
POTASSIUM SERPL-MCNC: 3.5 MMOL/L — SIGNIFICANT CHANGE UP (ref 3.5–5.3)
POTASSIUM SERPL-MCNC: 3.5 MMOL/L — SIGNIFICANT CHANGE UP (ref 3.5–5.3)
POTASSIUM SERPL-SCNC: 3.5 MMOL/L — SIGNIFICANT CHANGE UP (ref 3.5–5.3)
POTASSIUM SERPL-SCNC: 3.5 MMOL/L — SIGNIFICANT CHANGE UP (ref 3.5–5.3)
PROT SERPL-MCNC: 5.8 G/DL — LOW (ref 6–8.3)
PROT SERPL-MCNC: 6 G/DL — SIGNIFICANT CHANGE UP (ref 6–8.3)
RBC # BLD: 2.74 M/UL — LOW (ref 4.2–5.8)
RBC # BLD: 3.22 M/UL — LOW (ref 4.2–5.8)
RBC # BLD: 3.5 M/UL — LOW (ref 4.2–5.8)
RBC # FLD: 18.8 % — HIGH (ref 10.3–14.5)
RBC # FLD: 19.2 % — HIGH (ref 10.3–14.5)
RBC BLD AUTO: ABNORMAL
RETICS #: 100 K/UL — SIGNIFICANT CHANGE UP (ref 25–125)
RETICS/RBC NFR: 3.7 % — HIGH (ref 0.5–2.5)
RH IG SCN BLD-IMP: POSITIVE — SIGNIFICANT CHANGE UP
SODIUM SERPL-SCNC: 134 MMOL/L — LOW (ref 135–145)
SODIUM SERPL-SCNC: 137 MMOL/L — SIGNIFICANT CHANGE UP (ref 135–145)
TARGETS BLD QL SMEAR: SLIGHT — SIGNIFICANT CHANGE UP
WBC # BLD: 5.83 K/UL — SIGNIFICANT CHANGE UP (ref 3.8–10.5)
WBC # BLD: 6.38 K/UL — SIGNIFICANT CHANGE UP (ref 3.8–10.5)
WBC # FLD AUTO: 5.83 K/UL — SIGNIFICANT CHANGE UP (ref 3.8–10.5)
WBC # FLD AUTO: 6.38 K/UL — SIGNIFICANT CHANGE UP (ref 3.8–10.5)

## 2020-08-26 PROCEDURE — 99292 CRITICAL CARE ADDL 30 MIN: CPT

## 2020-08-26 PROCEDURE — 71045 X-RAY EXAM CHEST 1 VIEW: CPT | Mod: 26

## 2020-08-26 PROCEDURE — 99233 SBSQ HOSP IP/OBS HIGH 50: CPT | Mod: GC

## 2020-08-26 PROCEDURE — 99232 SBSQ HOSP IP/OBS MODERATE 35: CPT | Mod: GC

## 2020-08-26 PROCEDURE — 99291 CRITICAL CARE FIRST HOUR: CPT

## 2020-08-26 RX ORDER — FUROSEMIDE 40 MG
40 TABLET ORAL ONCE
Refills: 0 | Status: COMPLETED | OUTPATIENT
Start: 2020-08-26 | End: 2020-08-26

## 2020-08-26 RX ORDER — POTASSIUM CHLORIDE 20 MEQ
10 PACKET (EA) ORAL
Refills: 0 | Status: COMPLETED | OUTPATIENT
Start: 2020-08-26 | End: 2020-08-26

## 2020-08-26 RX ORDER — POTASSIUM CHLORIDE 20 MEQ
10 PACKET (EA) ORAL ONCE
Refills: 0 | Status: COMPLETED | OUTPATIENT
Start: 2020-08-26 | End: 2020-08-26

## 2020-08-26 RX ORDER — HEPARIN SODIUM 5000 [USP'U]/ML
5000 INJECTION INTRAVENOUS; SUBCUTANEOUS EVERY 8 HOURS
Refills: 0 | Status: DISCONTINUED | OUTPATIENT
Start: 2020-08-26 | End: 2020-10-02

## 2020-08-26 RX ORDER — CALCIUM GLUCONATE 100 MG/ML
1 VIAL (ML) INTRAVENOUS ONCE
Refills: 0 | Status: COMPLETED | OUTPATIENT
Start: 2020-08-26 | End: 2020-08-26

## 2020-08-26 RX ORDER — CHLORHEXIDINE GLUCONATE 213 G/1000ML
1 SOLUTION TOPICAL
Refills: 0 | Status: DISCONTINUED | OUTPATIENT
Start: 2020-08-26 | End: 2020-09-14

## 2020-08-26 RX ORDER — PANTOPRAZOLE SODIUM 20 MG/1
40 TABLET, DELAYED RELEASE ORAL
Refills: 0 | Status: DISCONTINUED | OUTPATIENT
Start: 2020-08-26 | End: 2020-09-06

## 2020-08-26 RX ORDER — DEXTROSE 50 % IN WATER 50 %
25 SYRINGE (ML) INTRAVENOUS ONCE
Refills: 0 | Status: COMPLETED | OUTPATIENT
Start: 2020-08-26 | End: 2020-08-26

## 2020-08-26 RX ADMIN — Medication 100 MILLIGRAM(S): at 17:15

## 2020-08-26 RX ADMIN — SODIUM CHLORIDE 3 MILLILITER(S): 9 INJECTION INTRAMUSCULAR; INTRAVENOUS; SUBCUTANEOUS at 06:15

## 2020-08-26 RX ADMIN — Medication 500 MILLIGRAM(S): at 20:07

## 2020-08-26 RX ADMIN — SODIUM CHLORIDE 3 MILLILITER(S): 9 INJECTION INTRAMUSCULAR; INTRAVENOUS; SUBCUTANEOUS at 21:24

## 2020-08-26 RX ADMIN — Medication 500 MILLIGRAM(S): at 01:49

## 2020-08-26 RX ADMIN — Medication 1 MILLIGRAM(S): at 11:22

## 2020-08-26 RX ADMIN — Medication 1000 UNIT(S): at 11:22

## 2020-08-26 RX ADMIN — Medication 40 MILLIGRAM(S): at 09:45

## 2020-08-26 RX ADMIN — Medication 50 MILLIEQUIVALENT(S): at 15:38

## 2020-08-26 RX ADMIN — Medication 20 MILLIGRAM(S): at 21:35

## 2020-08-26 RX ADMIN — Medication 500 MILLIGRAM(S): at 08:03

## 2020-08-26 RX ADMIN — Medication 30 MILLIGRAM(S): at 08:04

## 2020-08-26 RX ADMIN — INSULIN GLARGINE 10 UNIT(S): 100 INJECTION, SOLUTION SUBCUTANEOUS at 21:35

## 2020-08-26 RX ADMIN — POSACONAZOLE 300 MILLIGRAM(S): 100 TABLET, DELAYED RELEASE ORAL at 11:24

## 2020-08-26 RX ADMIN — Medication 20 MILLIGRAM(S): at 00:08

## 2020-08-26 RX ADMIN — PANTOPRAZOLE SODIUM 40 MILLIGRAM(S): 20 TABLET, DELAYED RELEASE ORAL at 17:15

## 2020-08-26 RX ADMIN — HEPARIN SODIUM 5000 UNIT(S): 5000 INJECTION INTRAVENOUS; SUBCUTANEOUS at 06:36

## 2020-08-26 RX ADMIN — MAGNESIUM OXIDE 400 MG ORAL TABLET 400 MILLIGRAM(S): 241.3 TABLET ORAL at 11:23

## 2020-08-26 RX ADMIN — Medication 100 MILLIGRAM(S): at 08:03

## 2020-08-26 RX ADMIN — ATOVAQUONE 1500 MILLIGRAM(S): 750 SUSPENSION ORAL at 11:23

## 2020-08-26 RX ADMIN — Medication 100 MILLIGRAM(S): at 01:49

## 2020-08-26 RX ADMIN — Medication 650 MILLIGRAM(S): at 13:12

## 2020-08-26 RX ADMIN — SODIUM CHLORIDE 3 MILLILITER(S): 9 INJECTION INTRAMUSCULAR; INTRAVENOUS; SUBCUTANEOUS at 13:06

## 2020-08-26 RX ADMIN — EVEROLIMUS 0.5 MILLIGRAM(S): 10 TABLET ORAL at 08:04

## 2020-08-26 RX ADMIN — Medication 25 MILLILITER(S): at 02:30

## 2020-08-26 RX ADMIN — HEPARIN SODIUM 5000 UNIT(S): 5000 INJECTION INTRAVENOUS; SUBCUTANEOUS at 13:13

## 2020-08-26 RX ADMIN — HEPARIN SODIUM 5000 UNIT(S): 5000 INJECTION INTRAVENOUS; SUBCUTANEOUS at 21:35

## 2020-08-26 RX ADMIN — Medication 100 GRAM(S): at 04:30

## 2020-08-26 RX ADMIN — EVEROLIMUS 0.5 MILLIGRAM(S): 10 TABLET ORAL at 20:07

## 2020-08-26 RX ADMIN — Medication 650 MILLIGRAM(S): at 13:47

## 2020-08-26 RX ADMIN — Medication 50 MILLIEQUIVALENT(S): at 03:00

## 2020-08-26 RX ADMIN — Medication 500 MILLIGRAM(S): at 13:12

## 2020-08-26 NOTE — PROGRESS NOTE ADULT - ASSESSMENT
A/P: 70y M with history of heart transplant in 2/2018 admitted with autoimmune hemolytic anemia and dark stools requiring transfusion. General Surgery consulted for concern for compartment syndrome. Now in CTU for acute kidney injury, on HD.     Plan:  - no acute general surgical intervention at this time, abd is soft and non-distended. Low suspicion for compartment syndrome at this time, but management discussed with CTU team.   - Patient tolerating diet and passing flatus/stools without difficulty  - thickening on CT of rectal mucosa likely 2/2 c. diff colitis     [place] Team Surgery  [phone]

## 2020-08-26 NOTE — PROGRESS NOTE ADULT - SUBJECTIVE AND OBJECTIVE BOX
[facility] DIVISION OF KIDNEY DISEASES AND HYPERTENSION -- FOLLOW UP NOTE  --------------------------------------------------------------------------------  Ritchie Sykes   Nephrology Fellow  Pager NS: [phone]/ [facility]: [phone]  (After 5 pm or on weekends please page the on-call fellow)    24 hour events/subjective: Patient seen and examined at the bedside. Underwent RHC yest with elevated filling pressures. Ongoing diarrhea, 2 loose BM's overnight. Given 2u pRBC with IV lasix 40mg. 1L of uop in last 24hrs, Scr improving to 2.9mg/dl        PAST HISTORY  --------------------------------------------------------------------------------  No significant changes to PMH, PSH, FHx, SHx, unless otherwise noted    ALLERGIES & MEDICATIONS  --------------------------------------------------------------------------------  Allergies    No Known Allergies    Intolerances      Standing Inpatient Medications  atovaquone Suspension 1500 milliGRAM(s) Oral daily  chlorhexidine 2% Cloths 1 Application(s) Topical <User Schedule>  cholecalciferol 1000 Unit(s) Oral daily  everolimus (ZORTRESS) 0.5 milliGRAM(s) Oral <User Schedule>  folic acid 1 milliGRAM(s) Oral daily  heparin   Injectable 5000 Unit(s) SubCutaneous every 8 hours  insulin glargine Injectable (LANTUS) 10 Unit(s) SubCutaneous at bedtime  insulin lispro (HumaLOG) corrective regimen sliding scale   SubCutaneous three times a day before meals  insulin lispro Injectable (HumaLOG) 3 Unit(s) SubCutaneous three times a day before meals  magnesium oxide 400 milliGRAM(s) Oral daily  metroNIDAZOLE  IVPB      metroNIDAZOLE  IVPB 500 milliGRAM(s) IV Intermittent every 8 hours  pantoprazole  Injectable 40 milliGRAM(s) IV Push two times a day  posaconazole DR Tablet 300 milliGRAM(s) Oral daily  pravastatin 20 milliGRAM(s) Oral at bedtime  predniSONE   Tablet 30 milliGRAM(s) Oral <User Schedule>  sodium chloride 0.9% lock flush 3 milliLiter(s) IV Push every 8 hours  vancomycin    Solution 500 milliGRAM(s) Oral every 6 hours    PRN Inpatient Medications  acetaminophen   Tablet .. 650 milliGRAM(s) Oral every 6 hours PRN      REVIEW OF SYSTEMS  --------------------------------------------------------------------------------  Gen: No fevers/chills  Head/Eyes/Ears/Mouth: No headache; Normal hearing; Normal vision    Respiratory: No dyspnea, cough  CV: No chest pain  GI: + abdominal distention, diarrhea  : No increased frequency, dysuria  MSK: + edema    All other systems were reviewed and are negative, except as noted.    >>> <<<    VITALS/PHYSICAL EXAM  --------------------------------------------------------------------------------  T(C): 35.3 (08-26-20 @ 08:00), Max: 36.9 (08-25-20 @ 12:43)  HR: 127 (08-26-20 @ 11:00) (113 - 127)  BP: 114/40 (08-25-20 @ 17:30) (114/40 - 116/67)  RR: 42 (08-26-20 @ 11:00) (14 - 66)  SpO2: 94% (08-26-20 @ 11:00) (93% - 99%)  Wt(kg): --        08-25-20 @ 07:01  -  08-26-20 @ 07:00  --------------------------------------------------------  IN: 1070 mL / OUT: 1215 mL / NET: -145 mL    08-26-20 @ 07:01  -  08-26-20 @ 11:52  --------------------------------------------------------  IN: 100 mL / OUT: 220 mL / NET: -120 mL      Physical Exam:    	Gen: NAD, well-appearing  	HEENT: Anicteric   	Pulm: Coarse BS B/L  	CV: Tachycardic   	Abd: +BS, soft, mild TPP/ distended       	: + diaz with urine  	MSK: Warm, Anasarca appreciated  	Neuro: No focal deficits, AOX3, no asterixis       	Skin: no visible rashes  	Psych: Appropriate Affect  	Vascular Access: None        LABS/STUDIES  --------------------------------------------------------------------------------              9.6    5.83  >-----------<  234      [08-26-20 @ 04:54]              29.9     137  |  99  |  72  ----------------------------<  66      [08-26-20 @ 01:23]  3.5   |  16  |  2.92        Ca     8.3     [08-26-20 @ 01:23]      Mg     3.0     [08-26-20 @ 01:23]      Phos  6.5     [08-26-20 @ 01:23]    TPro  5.8  /  Alb  3.3  /  TBili  0.8  /  DBili  x   /  AST  40  /  ALT  26  /  AlkPhos  63  [08-26-20 @ 01:23]    PT/INR: PT 12.0 , INR 1.01       [08-25-20 @ 19:15]  PTT: 25.8       [08-25-20 @ 19:15]          [08-26-20 @ 01:23]    Creatinine Trend:  SCr 2.92 [08-26 @ 01:23]  SCr 2.92 [08-25 @ 19:15]  SCr 3.08 [08-25 @ 10:54]  SCr 2.74 [08-24 @ 09:00]  SCr 2.31 [08-23 @ 10:51]    Urinalysis - [08-24-20 @ 21:27]      Color Yellow / Appearance Slightly Turbid / SG 1.018 / pH 5.5      Gluc Negative / Ketone Negative  / Bili Negative / Urobili <2 mg/dL       Blood Negative / Protein 30 mg/dL / Leuk Est Negative / Nitrite Negative      RBC 1 / WBC 3 / Hyaline 2 / Gran  / Sq Epi  / Non Sq Epi 2 / Bacteria Negative    Urine Creatinine 188      [08-24-20 @ 17:29]  Urine Sodium <35      [08-24-20 @ 17:29]  Urine Chloride <35      [08-24-20 @ 17:29]    Iron 36, TIBC 218, %sat 16      [08-15-20 @ 21:08]  Ferritin 764      [08-15-20 @ 21:12]  Vitamin D (25OH) 33.5      [04-30-20 @ 09:06]  HbA1c 4.7      [11-07-18 @ 23:18]  TSH 1.22      [08-12-20 @ 00:18]

## 2020-08-26 NOTE — PROGRESS NOTE ADULT - ASSESSMENT
70y old  Male with pmhx of  NICM s/p HM2 s/p OHT in 2018, autoimmune hemolytic anemia s/p steroids/rituxan, admitted for GIB c/b bacteremia, cdiff, and now NORMAN.    #NORMAN  - Etiology of NORMAN like multifactorial 2/2 infection, diarrhea, everolimus   - His baseline Scr ~1mg/dl, now increased to 2.7mg/dl  - Appears oliguric, standing weights rising, 79kg-81.8kg  - UA with 30 protein, no WBC or RBC, Urine Na<35, Urine Cl<35  - Urine studies consistent with CRS picture, could have ATN as well given the relative hypotension, lowest documented BP- 102/70. Last Everolimus trough was on 8/20 and noted to be elevated at ~10  - RHC on 8/25 with elevated pressures, given 2u pRBC and 40mg IV lasix with good response  - F/u HF team recs, agree with IV diuresis at this time  - F/u everolimus level, may nee to adjust dose in setting of diarrhea/NORMAN  - Monitor UOP and daily weights. Dose medications as per eGFR<20      #Azotemia  - 2/2 NORMAN and steroids, no evidence of uremia    #Metabolic Acidosis  - HAGMA  - Likely 2/2 NORMAN, well compensated on blood gas  - Recommend IV diuresis    #Anemia  - 2/2 GIB, Hgb 9.7 s/p 2 units on 8/25 with appropriate response  - Iron stores low, cannot give IV iron in setting of active infection  - pRBC transfusions per primary team  - No signs of active hemolysis

## 2020-08-26 NOTE — CHART NOTE - NSCHARTNOTEFT_GEN_A_CORE
Nutrition Follow Up Note    Patient seen for: Nutritional follow up     Source: RN, Medical record, CTU, Transplant team     Chart reviewed, events noted. 70 year old male with PMHx of NICM, s/p HM2 LVAD and OHT in 2018, with known coronary fistula. Recent admission for hemolytic anemia, now admitted for hyperglycemia and GIB. EGD and capsule study negative. Pt now C-Diff +. Ct showed dilated loops of bowel, surgery team following but state no interventions at this time.     Diet :  Full liquid    Patient reports: Pt seen, reports feeling okay. Having liquid BM's daily. Of note Pt has been NPO x3 days and is now advanced to a full liquid diet.   Pt states he has had a pudding so far and feels okay.   Noted BG levels a little on the low side, will follow up. Pt on high doses prednisone, also receiving insulin.   C-Diff being treated by Gloria    GI: 1 loose BM thus far today.       Daily Weight in k.8 (-25), Weight in k.1 (-24), Weight in k.4 (-23), Weight in k.4 (08-21), Weight in k.6 (08-20)    Drug Dosing Weight  Weight (kg): 75.2 (17 Aug 2020 14:29)  BMI (kg/m2): 26 (17 Aug 2020 14:29)      Pertinent Medications: MEDICATIONS  (STANDING):  atovaquone Suspension 1500 milliGRAM(s) Oral daily  chlorhexidine 2% Cloths 1 Application(s) Topical <User Schedule>  cholecalciferol 1000 Unit(s) Oral daily  everolimus (ZORTRESS) 0.5 milliGRAM(s) Oral <User Schedule>  folic acid 1 milliGRAM(s) Oral daily  heparin   Injectable 5000 Unit(s) SubCutaneous every 8 hours  insulin glargine Injectable (LANTUS) 10 Unit(s) SubCutaneous at bedtime  insulin lispro (HumaLOG) corrective regimen sliding scale   SubCutaneous three times a day before meals  insulin lispro Injectable (HumaLOG) 3 Unit(s) SubCutaneous three times a day before meals  magnesium oxide 400 milliGRAM(s) Oral daily  metroNIDAZOLE  IVPB      metroNIDAZOLE  IVPB 500 milliGRAM(s) IV Intermittent every 8 hours  pantoprazole  Injectable 40 milliGRAM(s) IV Push two times a day  posaconazole DR Tablet 300 milliGRAM(s) Oral daily  pravastatin 20 milliGRAM(s) Oral at bedtime  predniSONE   Tablet 30 milliGRAM(s) Oral <User Schedule>  sodium chloride 0.9% lock flush 3 milliLiter(s) IV Push every 8 hours  vancomycin    Solution 500 milliGRAM(s) Oral every 6 hours    MEDICATIONS  (PRN):  acetaminophen   Tablet .. 650 milliGRAM(s) Oral every 6 hours PRN Mild Pain (1 - 3)      LABS:    @ 04:54: Sodium --, Potassium --, Calcium --, Magnesium --, Phosphorus --, BUN --, Creatinine --, Glucose --, Alk Phos --, ALT/SGPT --, AST/SGOT --, Albumin --, Prealbumin --, Total Bilirubin --, Hemoglobin 9.6<L>, Hematocrit 29.9<L>, Ferritin --, C-Reactive Protein --, Creatine Kinase <<27>   @ 01:23: Sodium 137, Potassium 3.5, Calcium 8.3<L>, Magnesium 3.0<H>, Phosphorus 6.5<H>, BUN 72<H>, Creatinine 2.92<H>, Glucose 66<L>, Alk Phos 63, ALT/SGPT 26, AST/SGOT 40, Albumin 3.3, Prealbumin --, Total Bilirubin 0.8, Hemoglobin --, Hematocrit --, Ferritin --, C-Reactive Protein --, Creatine Kinase <<27>   @ 19:15: Sodium 135, Potassium 3.8, Calcium 8.1<L>, Magnesium 3.0<H>, Phosphorus 6.9<H>, BUN 68<H>, Creatinine 2.92<H>, Glucose 80, Alk Phos 61, ALT/SGPT 26, AST/SGOT 40, Albumin 3.4, Prealbumin --, Total Bilirubin 0.8, Hemoglobin 6.9<LL>, Hematocrit 22.0<L>, Ferritin --, C-Reactive Protein --, Creatine Kinase <<27>   @ 15:10: Sodium --, Potassium --, Calcium --, Magnesium --, Phosphorus --, BUN --, Creatinine --, Glucose --, Alk Phos --, ALT/SGPT --, AST/SGOT --, Albumin --, Prealbumin 24, Total Bilirubin --, Hemoglobin --, Hematocrit --, Ferritin --, C-Reactive Protein --, Creatine Kinase <<27>      Finger Sticks:  POCT Blood Glucose.: 78 mg/dL ( @ 08:16)  POCT Blood Glucose.: 73 mg/dL ( @ 22:46)  POCT Blood Glucose.: 80 mg/dL ( @ 11:48)      Skin per nursing documentation: intact  Edema: +1 tara foot and +2 right arm     Estimated Needs:   [ X] no change since previous assessment Based on dosing wt: 74.8kg    Estimated Energy Needs (20-25 calories/kg): 1496 1870   Estimated Protein Needs (0.75-1.0 gm/kg): 56.1- 74.8    Previous Nutrition Diagnosis: altered nutrition lab values  Nutrition Diagnosis is: BG levels being monitored     New Nutrition Diagnosis: inadequate oral intake  Related to:  altered GI function   As evidenced by:  NPO x3 days, now on full liquid diet.       Interventions:     Recommend  1. Continue full liquid diet. advance to consistent CHO as medically feasible   2. Encourage adequate PO intake   3. Trend GI tolerance  4. Trend BG levels, renal indices, LFT's, electrolytes      Monitoring and Evaluation:   Continue to monitor nutritional intake, tolerance to diet prescription, weights, labs, skin integrity  RD remains available upon request and will follow up per protocol  Gabbi Flowers RD, CDN, [facility] Pager #[phone]

## 2020-08-26 NOTE — PROGRESS NOTE ADULT - SUBJECTIVE AND OBJECTIVE BOX
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 Patient was treated w/ prednisone and Rituximab. Tacro was discontinued and patient was also transitioned to everolimus  Admitted  -  for hyperglycemia.  Reported to transplant team having one done black tarry stool-  instructed to  present to [facility] for hemolytic anemia. Patient has been following with Hematology outpatient.  Denies CP  N/V diarrhea SOB. (11 Aug 2020 20:08)      Hospital Course:    Admitted to [facility] with symptomatic anemia   EGD for investigation of tarry stools  8/15 SB capsule: stomach & SB lesions, likely ulcers.   EGD/push enteroscopy w/ angioectasias/erosions in small bowel. Gastropathy and esophagitis. Ulcer seen on VCE most likely scope trauma.     VSS transferred back to floor.    VS 9.0.4 stable Gastric biopsy results LA Grade A esophagitis.  - Acute gastritis. Biopsies taken to r/o CMV, No ulceration seen despite finding on capsule endoscopy - likely 2/2 scope  trauma that has since resolved. Pathology pending.   VSS H/H  8..7  trending down will monitor prednisone taper 30 mg today. Procardia 120 initiated today RHC biopsy Monday.    VVS; Patient reports loose stools x 2-3 days with 1 episode today.  Stool sent for C-dif and GI PCR. Abdominal X-ray revealed: dilated loops of bowel. Abdomen distended.  Patient denies N/V. GI called to re-evaluate. Continue with current medication regimen.  Awaiting for upcoming cardiac biopsy on .     vss    creat up to 2.28 ,  repeating CMP,  TTE ordered  Bladder scan      cardiac bx cancelled   elev creat  to 2.74   cardio renal cslt called,    + CDIF   inc vanco to 500 q6   ID following   VSS: RHC today as per transplant team; transfuse 2 units PRBC today as per Dr. Viramontes; continue vanco for cdiff; transfer patient to CTU after cath today       VITAL SIGNS:  Vital Signs Last 24 Hrs  T(C): 35.3 (26 Aug 2020 08:00), Max: 36.9 (25 Aug 2020 12:43)  T(F): 95.5 (26 Aug 2020 08:00), Max: 98.4 (25 Aug 2020 12:43)  HR: 119 (26 Aug 2020 07:00) (113 - 121)  BP: 114/40 (25 Aug 2020 17:30) (114/40 - 116/67)  BP(mean): 63 (25 Aug 2020 17:30) (63 - 85)  RR: 56 (26 Aug 2020 07:00) (14 - 66)  SpO2: 99% (26 Aug 2020 07:00) (93% - 99%)    =========================I&Os=========================  I/Os:  I&O's Detail    25 Aug 2020 07:01  -  26 Aug 2020 07:00  --------------------------------------------------------  IN:    IV PiggyBack: 100 mL    Oral Fluid: 220 mL    Packed Red Blood Cells: 650 mL    Solution: 100 mL  Total IN: 1070 mL    OUT:    Indwelling Catheter - Urethral: 1065 mL    Voided: 150 mL  Total OUT: 1215 mL    Total NET: -145 mL          CAPILLARY BLOOD GLUCOSE      POCT Blood Glucose.: 73 mg/dL (25 Aug 2020 22:46)  POCT Blood Glucose.: 80 mg/dL (25 Aug 2020 11:48)      ============================LABS=========================                        9.6    5.83  )-----------( 234      ( 26 Aug 2020 04:54 )             29.9     08-26    137  |  99  |  72<H>  ----------------------------<  66<L>  3.5   |  16<L>  |  2.92<H>    Ca    8.3<L>      26 Aug 2020 01:23  Phos  6.5       Mg     3.0         TPro  5.8<L>  /  Alb  3.3  /  TBili  0.8  /  DBili  x   /  AST  40  /  ALT  26  /  AlkPhos  63      LIVER FUNCTIONS - ( 26 Aug 2020 01:23 )  Alb: 3.3 g/dL / Pro: 5.8 g/dL / ALK PHOS: 63 U/L / ALT: 26 U/L / AST: 40 U/L / GGT: x           PT/INR - ( 25 Aug 2020 19:15 )   PT: 12.0 sec;   INR: 1.01 ratio         PTT - ( 25 Aug 2020 19:15 )  PTT:25.8 sec  ABG - ( 26 Aug 2020 04:55 )  pH, Arterial: 7.44  pH, Blood: x     /  pCO2: 32    /  pO2: 73    / HCO3: 21    / Base Excess: -1.9  /  SaO2: 94                Urinalysis Basic - ( 24 Aug 2020 21:27 )    Color: Yellow / Appearance: Slightly Turbid / S.018 / pH: x  Gluc: x / Ketone: Negative  / Bili: Negative / Urobili: <2 mg/dL   Blood: x / Protein: 30 mg/dL / Nitrite: Negative   Leuk Esterase: Negative / RBC: 1 /HPF / WBC 3 /HPF   Sq Epi: x / Non Sq Epi: 2 /HPF / Bacteria: Negative        ===========================PMHX&PSHx==========================  PAST MEDICAL & SURGICAL HISTORY:  H/O hemolytic anemia  H/O autoimmune hemolytic anemia  Knee pain, right  HLD (hyperlipidemia)  Former smoker  DVT of upper extremity (deep vein thrombosis)  Hepatitis C virus  GIB (gastrointestinal bleeding)  Ventricular fibrillation: s/p AICD  PAF (paroxysmal atrial fibrillation): on xarelto  Non-Ischemic Cardiomyopathy  SVT (Supraventricular Tachycardia)  HTN  CHF (Congestive Heart Failure)  S/P right heart catheterization: biopsy multiple  H/O heart transplant: 2018  Status post left hip replacement  History of Prior Ablation Treatment: for afib  AICD (Automatic Cardioverter/Defibrillator) Present: [employer] with 1 [employer] lead09- explanted and replaced with Medtronic 2 leads on 09    ============================IMAGING STUDIES=========================  CT Abdomen and Pelvis w/ Oral Cont (20 @ 13:30)   IMPRESSION:  Right lower lobe consolidation with air bronchograms unchanged since 2020. This could represent atelectasis and/or infection. Correlate clinically. Previously described patchy opacities in the left lower lobe and lingula have resolved. Trace right pleural effusion.Small right pleural effusion.  Rectal wall thickening. New since the previous exam. Correlate clinically for proctitis. Mild distention of the colon proximal to this. No small bowel obstruction.    ============================MEDICATIONS=========================  MEDICATIONS  (STANDING):  atovaquone Suspension 1500 milliGRAM(s) Oral daily  cholecalciferol 1000 Unit(s) Oral daily  everolimus (ZORTRESS) 0.5 milliGRAM(s) Oral <User Schedule>  folic acid 1 milliGRAM(s) Oral daily  heparin   Injectable 5000 Unit(s) SubCutaneous every 8 hours  insulin glargine Injectable (LANTUS) 10 Unit(s) SubCutaneous at bedtime  insulin lispro (HumaLOG) corrective regimen sliding scale   SubCutaneous three times a day before meals  insulin lispro Injectable (HumaLOG) 3 Unit(s) SubCutaneous three times a day before meals  magnesium oxide 400 milliGRAM(s) Oral daily  metroNIDAZOLE  IVPB      metroNIDAZOLE  IVPB 500 milliGRAM(s) IV Intermittent every 8 hours  pantoprazole  Injectable 40 milliGRAM(s) IV Push daily  posaconazole DR Tablet 300 milliGRAM(s) Oral daily  pravastatin 20 milliGRAM(s) Oral at bedtime  predniSONE   Tablet 30 milliGRAM(s) Oral <User Schedule>  sodium chloride 0.9% lock flush 3 milliLiter(s) IV Push every 8 hours  vancomycin    Solution 500 milliGRAM(s) Oral every 6 hours    MEDICATIONS  (PRN):  acetaminophen   Tablet .. 650 milliGRAM(s) Oral every 6 hours PRN Mild Pain (1 - 3)      =============================ASSESSMENT===========================   Heart transplant 2018 for ACC/AHA stage D NICM   Supraventricular tachycardia  severe hypertension  Klebsiella oxytoca bactermia  Sepsis  acute respiratory failure, resolved  Hyperlactatemia acidosis, resolved  Leukopenia  Acute blood loss anemia, stable  GI Bleed  Cdiff diarrhea  CKD    =============================NEUROLOGY============================  Pain control with Tylenol prn   Continue to monitor neuro status     ==============================RESPIRATORY========================  Pt is on 2L nasal canula   Encourage incentive spirometry, continue pulse ox monitoring, follow ABGs     ============================CARDIOVASCULAR======================  Heart transplant 2018 for ACC/AHA stage D NICM, c/w Prednisone taper; off cellcept while on high dose steroids   Pravastatin for transplant CAD prophylaxis   Continue hemodynamic monitoring.  Not on any pressors    =====================RENAL===================  NORMAN on CKDl Anasarca   Monitor I/Os and electrolytes    ====================GASTROINTESTINAL===================  Tolerating full liquid diet   Continue GI prophylaxis with Protonix     =========================ENDOCRINE==========================  Glycemic control with humalog sliding scale and lantus for stress hyperglycemia     ========================HEMATOLOGIC/ONCOLOGIC====================  Anemia s/p multiple blood transfusions  Follow CBC in AM  Heparin drip     ============================INFECTIOUS DISEASE========================  Clostridium difficile PCR positive, continue with vancomycin and metronidazole   Continue transplant prophylaxis with Posaconazole and Atovaquone       Pt is on GI & DVT prophylaxis  OOB & ambulate     Pertinent clinical, laboratory, radiographic, hemodynamic, echocardiographic, respiratory data, microbiologic data and chart were reviewed and analyzed frequently throughout the course of the day and night  Patient seen, examined and plan discussed with CT Surgery / CTICU team during rounds.    I spent 30 minutes at bedside providing critical care from 7am to 5pm.    By signing my name below, I, Ronit Joe, attest that this documentation has been prepared under the direction and in the presence of Maverick Rondon DO  Electronically Signed: Katty Gaffney. 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 Patient was treated w/ prednisone and Rituximab. Tacro was discontinued and patient was also transitioned to everolimus  Admitted  -  for hyperglycemia.  Reported to transplant team having one done black tarry stool-  instructed to  present to [facility] for hemolytic anemia. Patient has been following with Hematology outpatient.  Denies CP  N/V diarrhea SOB. (11 Aug 2020 20:08)      Hospital Course:    Admitted to [facility] with symptomatic anemia   EGD for investigation of tarry stools  8/15 SB capsule: stomach & SB lesions, likely ulcers.   EGD/push enteroscopy w/ angioectasias/erosions in small bowel. Gastropathy and esophagitis. Ulcer seen on VCE most likely scope trauma.     VSS transferred back to floor.    VS 9.0.4 stable Gastric biopsy results LA Grade A esophagitis.  - Acute gastritis. Biopsies taken to r/o CMV, No ulceration seen despite finding on capsule endoscopy - likely 2/2 scope  trauma that has since resolved. Pathology pending.   VSS H/H  8..7  trending down will monitor prednisone taper 30 mg today. Procardia 120 initiated today RHC biopsy Monday.    VVS; Patient reports loose stools x 2-3 days with 1 episode today.  Stool sent for C-dif and GI PCR. Abdominal X-ray revealed: dilated loops of bowel. Abdomen distended.  Patient denies N/V. GI called to re-evaluate. Continue with current medication regimen.  Awaiting for upcoming cardiac biopsy on .     vss    creat up to 2.28 ,  repeating CMP,  TTE ordered  Bladder scan      cardiac bx cancelled   elev creat  to 2.74   cardio renal cslt called,    + CDIF   inc vanco to 500 q6   ID following   VSS: RHC today as per transplant team; transfuse 2 units PRBC today as per Dr. Viramontes; continue vanco for cdiff; transfer patient to CTU after cath today       VITAL SIGNS:  Vital Signs Last 24 Hrs  T(C): 35.3 (26 Aug 2020 08:00), Max: 36.9 (25 Aug 2020 12:43)  T(F): 95.5 (26 Aug 2020 08:00), Max: 98.4 (25 Aug 2020 12:43)  HR: 119 (26 Aug 2020 07:00) (113 - 121)  BP: 114/40 (25 Aug 2020 17:30) (114/40 - 116/67)  BP(mean): 63 (25 Aug 2020 17:30) (63 - 85)  RR: 56 (26 Aug 2020 07:00) (14 - 66)  SpO2: 99% (26 Aug 2020 07:00) (93% - 99%)    =========================I&Os=========================  I/Os:  I&O's Detail    25 Aug 2020 07:01  -  26 Aug 2020 07:00  --------------------------------------------------------  IN:    IV PiggyBack: 100 mL    Oral Fluid: 220 mL    Packed Red Blood Cells: 650 mL    Solution: 100 mL  Total IN: 1070 mL    OUT:    Indwelling Catheter - Urethral: 1065 mL    Voided: 150 mL  Total OUT: 1215 mL    Total NET: -145 mL          CAPILLARY BLOOD GLUCOSE      POCT Blood Glucose.: 73 mg/dL (25 Aug 2020 22:46)  POCT Blood Glucose.: 80 mg/dL (25 Aug 2020 11:48)      ============================LABS=========================                        9.6    5.83  )-----------( 234      ( 26 Aug 2020 04:54 )             29.9     08-26    137  |  99  |  72<H>  ----------------------------<  66<L>  3.5   |  16<L>  |  2.92<H>    Ca    8.3<L>      26 Aug 2020 01:23  Phos  6.5       Mg     3.0         TPro  5.8<L>  /  Alb  3.3  /  TBili  0.8  /  DBili  x   /  AST  40  /  ALT  26  /  AlkPhos  63      LIVER FUNCTIONS - ( 26 Aug 2020 01:23 )  Alb: 3.3 g/dL / Pro: 5.8 g/dL / ALK PHOS: 63 U/L / ALT: 26 U/L / AST: 40 U/L / GGT: x           PT/INR - ( 25 Aug 2020 19:15 )   PT: 12.0 sec;   INR: 1.01 ratio         PTT - ( 25 Aug 2020 19:15 )  PTT:25.8 sec  ABG - ( 26 Aug 2020 04:55 )  pH, Arterial: 7.44  pH, Blood: x     /  pCO2: 32    /  pO2: 73    / HCO3: 21    / Base Excess: -1.9  /  SaO2: 94                Urinalysis Basic - ( 24 Aug 2020 21:27 )    Color: Yellow / Appearance: Slightly Turbid / S.018 / pH: x  Gluc: x / Ketone: Negative  / Bili: Negative / Urobili: <2 mg/dL   Blood: x / Protein: 30 mg/dL / Nitrite: Negative   Leuk Esterase: Negative / RBC: 1 /HPF / WBC 3 /HPF   Sq Epi: x / Non Sq Epi: 2 /HPF / Bacteria: Negative        ===========================PMHX&PSHx==========================  PAST MEDICAL & SURGICAL HISTORY:  H/O hemolytic anemia  H/O autoimmune hemolytic anemia  Knee pain, right  HLD (hyperlipidemia)  Former smoker  DVT of upper extremity (deep vein thrombosis)  Hepatitis C virus  GIB (gastrointestinal bleeding)  Ventricular fibrillation: s/p AICD  PAF (paroxysmal atrial fibrillation): on xarelto  Non-Ischemic Cardiomyopathy  SVT (Supraventricular Tachycardia)  HTN  CHF (Congestive Heart Failure)  S/P right heart catheterization: biopsy multiple  H/O heart transplant: 2018  Status post left hip replacement  History of Prior Ablation Treatment: for afib  AICD (Automatic Cardioverter/Defibrillator) Present: [employer] with 1 [employer] lead09- explanted and replaced with Medtronic 2 leads on 09    ============================IMAGING STUDIES=========================  CT Abdomen and Pelvis w/ Oral Cont (20 @ 13:30)   IMPRESSION:  Right lower lobe consolidation with air bronchograms unchanged since 2020. This could represent atelectasis and/or infection. Correlate clinically. Previously described patchy opacities in the left lower lobe and lingula have resolved. Trace right pleural effusion.Small right pleural effusion.  Rectal wall thickening. New since the previous exam. Correlate clinically for proctitis. Mild distention of the colon proximal to this. No small bowel obstruction.    ============================MEDICATIONS=========================  MEDICATIONS  (STANDING):  atovaquone Suspension 1500 milliGRAM(s) Oral daily  cholecalciferol 1000 Unit(s) Oral daily  everolimus (ZORTRESS) 0.5 milliGRAM(s) Oral <User Schedule>  folic acid 1 milliGRAM(s) Oral daily  heparin   Injectable 5000 Unit(s) SubCutaneous every 8 hours  insulin glargine Injectable (LANTUS) 10 Unit(s) SubCutaneous at bedtime  insulin lispro (HumaLOG) corrective regimen sliding scale   SubCutaneous three times a day before meals  insulin lispro Injectable (HumaLOG) 3 Unit(s) SubCutaneous three times a day before meals  magnesium oxide 400 milliGRAM(s) Oral daily  metroNIDAZOLE  IVPB      metroNIDAZOLE  IVPB 500 milliGRAM(s) IV Intermittent every 8 hours  pantoprazole  Injectable 40 milliGRAM(s) IV Push daily  posaconazole DR Tablet 300 milliGRAM(s) Oral daily  pravastatin 20 milliGRAM(s) Oral at bedtime  predniSONE   Tablet 30 milliGRAM(s) Oral <User Schedule>  sodium chloride 0.9% lock flush 3 milliLiter(s) IV Push every 8 hours  vancomycin    Solution 500 milliGRAM(s) Oral every 6 hours    MEDICATIONS  (PRN):  acetaminophen   Tablet .. 650 milliGRAM(s) Oral every 6 hours PRN Mild Pain (1 - 3)      =============================ASSESSMENT===========================   Heart transplant 2018 for ACC/AHA stage D NICM   Supraventricular tachycardia  severe hypertension  Klebsiella oxytoca bactermia  Sepsis  acute respiratory failure, resolved  Hyperlactatemia acidosis, resolved  Leukopenia  Acute blood loss anemia, stable  GI Bleed  Cdiff diarrhea  CKD    =============================NEUROLOGY============================  Pain control with Tylenol prn   Continue to monitor neuro status     ==============================RESPIRATORY========================  Pt is on 2L nasal canula   Encourage incentive spirometry, continue pulse ox monitoring, follow ABGs     ============================CARDIOVASCULAR======================  Heart transplant 2018 for ACC/AHA stage D NICM, c/w Prednisone taper; off cellcept while on high dose steroids   Pravastatin for transplant CAD prophylaxis   Continue hemodynamic monitoring.  Not on any pressors    =====================RENAL===================  NORMAN on CKDl Anasarca   Monitor I/Os and electrolytes    ====================GASTROINTESTINAL===================  Tolerating full liquid diet   Continue GI prophylaxis with Protonix     =========================ENDOCRINE==========================  Glycemic control with humalog sliding scale and lantus for stress hyperglycemia     ========================HEMATOLOGIC/ONCOLOGIC====================  Anemia s/p multiple blood transfusions  Follow CBC in AM  Heparin drip     ============================INFECTIOUS DISEASE========================  Clostridium difficile PCR positive, continue with vancomycin and metronidazole   Continue transplant prophylaxis with Posaconazole and Atovaquone   Monitor team, WBC  ID on board    Pt is on GI & DVT prophylaxis  OOB & ambulate     Pertinent clinical, laboratory, radiographic, hemodynamic, echocardiographic, respiratory data, microbiologic data and chart were reviewed and analyzed frequently throughout the course of the day and night  Patient seen, examined and plan discussed with CT Surgery / CTICU team during rounds.    I spent 30 minutes at bedside providing critical care from 7am to 5pm.    By signing my name below, I, Ronit Joe, attest that this documentation has been prepared under the direction and in the presence of Maverick Rondon DO  Electronically Signed: Katty Gaffney. -20 @ 08:05

## 2020-08-26 NOTE — PROGRESS NOTE ADULT - SUBJECTIVE AND OBJECTIVE BOX
INFECTIOUS DISEASES FOLLOW UP    This is a follow up note for this  69 yo Male with  s/p OHTx 2  severe C.diff  NORMAN    Interval event:  - RHC yesterday, found to have high filling pressure so moved to CTU for monitor  - Left IJ central line, right radial A-line, Rosas placed  - 2 loose BM overnight, 1 BM this afternoon  - Abd still very distended      ROS:  CONSTITUTIONAL:  No fever, good appetite  CARDIOVASCULAR:  No chest pain or palpitations  RESPIRATORY:  No dyspnea  GASTROINTESTINAL:  c/o abdominal distension and diarrhea  GENITOURINARY:  No dysuria  NEUROLOGIC:  No headache,     Allergies  No Known Allergies    ANTIMICROBIALS:    atovaquone Suspension 1500 daily  metroNIDAZOLE  IVPB    metroNIDAZOLE  IVPB 500 every 8 hours  posaconazole DR Tablet 300 daily  vancomycin    Solution 500 every 6 hours    OTHER MEDS:  MEDICATIONS  (STANDING):  acetaminophen   Tablet .. 650 every 6 hours PRN  everolimus (ZORTRESS) 0.5 <User Schedule>  heparin   Injectable 5000 every 8 hours  insulin glargine Injectable (LANTUS) 10 at bedtime  insulin lispro (HumaLOG) corrective regimen sliding scale  three times a day before meals  insulin lispro Injectable (HumaLOG) 3 three times a day before meals  pantoprazole  Injectable 40 two times a day  pravastatin 20 at bedtime  predniSONE   Tablet 30 <User Schedule>    Vital Signs Last 24 Hrs  T(F): 97.7 (20 @ 12:00), Max: 99.1 (20 @ 19:50)    Vital Signs Last 24 Hrs  HR: 121 (20 @ 15:00) (113 - 127)  BP: 114/40 (20 @ 17:30) (114/40 - 114/40)  RR: 18 (20 @ 15:00)  SpO2: 97% (20 @ 15:00) (93% - 99%)  Wt(kg): --    PHYSICAL EXAM:  Constitutional: no acute distress, but lying in bed instead of the chair  Eyes: WALT, EOMI  Ear/Nose/Throat: no oral lesions, 	  Respiratory: clear BL  Cardiovascular: S1S2 sternotomy healed  Gastrointestinal: hyperactive BS, very distended abd, not overly tender on exam  Extremities:no e/e/c  No Lymphadenopathy  IV sites not inflammed.    Labs:                        10.4   6.38  )-----------( 247      ( 26 Aug 2020 14:35 )             31.8         134<L>  |  98  |  74<H>  ----------------------------<  165<H>  3.5   |  18<L>  |  2.96<H>    Ca    8.7      26 Aug 2020 14:35  Phos  5.5       Mg     3.0         TPro  6.0  /  Alb  3.4  /  TBili  0.9  /  DBili  x   /  AST  34  /  ALT  27  /  AlkPhos  69        WBC Trend:  WBC Count: 6.38 (20 @ 14:35)  WBC Count: 5.83 (20 @ 04:54)  WBC Count: 8.34 (20 @ 19:15)  WBC Count: see note (20 @ 10:54)      Creatine Trend:  Creatinine, Serum: 2.96 ()  Creatinine, Serum: 2.92 ()  Creatinine, Serum: 2.92 ()  Creatinine, Serum: 3.08 ()      Liver Biochemical Testing Trend:  Alanine Aminotransferase (ALT/SGPT): 27 ()  Alanine Aminotransferase (ALT/SGPT): 26 ()  Alanine Aminotransferase (ALT/SGPT): 26 ()  Aspartate Aminotransferase (AST/SGOT): 34 (20 @ 14:35)  Aspartate Aminotransferase (AST/SGOT): 40 (20 @ 01:23)  Aspartate Aminotransferase (AST/SGOT): 40 (20 @ 19:15)  Aspartate Aminotransferase (AST/SGOT): 40 (20 @ 10:54)  Bilirubin Total, Serum: 0.9 ()  Bilirubin Total, Serum: 0.8 ()  Bilirubin Total, Serum: 0.8 ()  Bilirubin Total, Serum: 0.6 ()      Trend LDH  20 @ 01:23  550<H>  20 @ 08:51  376<H>  20 @ 08:50  390<H>  20 @ 07:26  323<H>  20 @ 07:16  266<H>  20 @ 07:53  246<H>  20 @ 07:39  247<H>  20 @ 07:42  223  20 @ 07:45  216  20 @ 07:15  237  20 @ 08:29  256<H>  20 @ 11:15  253<H>      Urinalysis Basic - ( 24 Aug 2020 21:27 )    Color: Yellow / Appearance: Slightly Turbid / S.018 / pH: x  Gluc: x / Ketone: Negative  / Bili: Negative / Urobili: <2 mg/dL   Blood: x / Protein: 30 mg/dL / Nitrite: Negative   Leuk Esterase: Negative / RBC: 1 /HPF / WBC 3 /HPF   Sq Epi: x / Non Sq Epi: 2 /HPF / Bacteria: Negative        MICROBIOLOGY:    C Diff by PCR Result: Detected ( @ 01:54)    C. difficile GDH &amp; toxins A/B by EIA (20 @ 18:22)    Clostridium difficile GDH Toxins A&amp;B, EIA:   Indeterminate    Clostridium difficile GDH Interpretation: This specimen is positive for C. Difficile glutamate dehydrogenase (GDH)  antigen and negative for C. Difficile Toxins A & B, by EIA.  GDH is a  highly sensitive screening marker for C. Difficile that is produced in  large amounts by all C. Difficilestrains, both toxigenic and  nontoxigenic.  Specimens that are GDH positive and toxin negative will be  tested further by PCR to detect toxin gene sequences associated with  toxin producing C. Difficle.      Culture - Blood (collected 24 Aug 2020 11:07)  Source: .Blood Blood  Preliminary Report:    No growth to date.    GI PCR Panel, Stool (collected 23 Aug 2020 02:01)  Source: .Stool Feces  Final Report:    GI PCR Results: NOT detected    Culture - Urine (collected 14 Aug 2020 13:32)  Source: .Urine Clean Catch (Midstream)  Final Report:    No growth    Culture - Blood (collected 14 Aug 2020 01:32)  Source: .Blood Blood  Final Report:    No Growth Final    Culture - Blood (collected 14 Aug 2020 01:32)  Source: .Blood Blood  Final Report:    No Growth Final    Culture - Blood (collected 13 Aug 2020 14:48)  Source: .Blood Blood-Peripheral  Final Report:    Growth in aerobic and anaerobic bottles: Klebsiella oxytoca/Raoutella    ornithinolytica    See previous culture [id number]    Culture - Blood (collected 13 Aug 2020 12:14)  Source: .Blood Blood-Peripheral  Final Report:    Growth in anaerobic bottle: Klebsiella oxytoca/Raoutella ornithinolytica    "Due to technical problems, Proteus sp. will Not be reported as part of    the BCID panel until further notice"    ***Blood Panel PCR results on this specimen are available    approximately 3 hours after the Gram stain result.***    Gram stain, PCR, and/or culture results may not always    correspond due to difference in methodologies.    ************************************************************    This PCR assay was performed using[employer].    The following targets are tested for: Enterococcus,    vancomycin resistant enterococci, Listeria monocytogenes,    coagulase negative staphylococci, S. aureus,    methicillin resistant S. aureus, Streptococcus agalactiae    (Group B), S.pneumoniae, S. pyogenes (Group A),    Acinetobacter baumannii, Enterobacter cloacae, E. coli,    Klebsiella oxytoca, K. pneumoniae, Proteus sp.,    Serratia marcescens, Haemophilus influenzae,    Neisseria meningitidis, Pseudomonas aeruginosa, Candida    albicans, C. glabrata, C krusei, C parapsilosis,    C. tropicalis and the KPC resistance gene.  Organism: Blood Culture PCR  Klebsiella oxytoca /Raoutella ornithinolytica  Organism: Klebsiella oxytoca /Raoutella ornithinolytica    Sensitivities:      -  Amikacin: S <=16      -  Ampicillin: R >16 These ampicillin results predict results for amoxicillin      -  Ampicillin/Sulbactam: I 16/8 Enterobacter, Citrobacter, and Serratia may develop resistance during prolonged therapy (3-4 days)      -  Aztreonam: S <=4      -  Cefazolin: R >16 Enterobacter, Citrobacter, and Serratia may develop resistance during prolonged therapy (3-4 days)      -  Cefepime: S <=2      -  Cefoxitin: S <=8      -  Ceftriaxone: S <=1 Enterobacter, Citrobacter, and Serratia may develop resistance during prolonged therapy      -  Ciprofloxacin: S <=0.25      -  Ertapenem: S <=0.5      -  Gentamicin: S <=2      -  Imipenem: S <=1      -  Levofloxacin: S <=0.5      -  Meropenem: S <=1      -  Piperacillin/Tazobactam: S <=8      -  Tobramycin: S <=2      -  Trimethoprim/Sulfamethoxazole: R >      Method Type: KAMILA  Organism: Blood Culture PCR    Sensitivities:      -  Klebsiella oxytoca: Detec      Method Type: PCR    Culture - Urine (collected 13 Aug 2020 00:40)  Source: .Urine Clean Catch (Midstream)  Final Report:    >=3 organisms. Probable collection contamination.    Culture - Blood (collected 2020 16:59)  Source: .Blood Blood-Peripheral  Final Report:    No Growth Final    Culture - Blood (collected 2020 14:19)  Source: .Blood Blood-Peripheral  Final Report:    No Growth Final      ABS CD4: 120 /uL (18 @ 13:00)    HIV-1 RNA Quantitative, Viral Load:   NOT DET. (18 @ 19:10)  HIV-1 Viral Load Result: NOT DET. (18 @ 19:10)  HIV-1 RNA Quantitative, Viral Load:   NOT DET. (18 @ 19:25)  HIV-1 Viral Load Result: NOT DET. (18 @ 19:25)        OTHER TESTS:  COVID-19 PCR: NotDetec (20 @ 13:14)  COVID-19 PCR: NotDetec (20 @ 20:08)        Blood Gas Arterial, Lactate: 0.7 ( @ 04:55)  Blood Gas Arterial, Lactate: 0.7 ( @ 01:16)  Blood Gas Arterial, Lactate: 1.0 ( @ 18:39)  Blood Gas Venous - Lactate: 1.1 ( @ 18:39)        RADIOLOGY & ADDITIONAL STUDIES:    < from: CT Abdomen and Pelvis w/ Oral Cont (20 @ 13:30) >  IMPRESSION:  Right lower lobe consolidation with air bronchograms unchanged since 2020. This could represent atelectasis and/or infection. Correlate clinically. Previously described patchy opacities in the left lower lobe and lingula have resolved. Trace right pleural effusion.Small right pleural effusion.    Rectal wall thickening. New since the previous exam. Correlate clinically for proctitis. Mild distention of the colon proximal to this. No small bowel obstruction.    < end of copied text >

## 2020-08-26 NOTE — PROGRESS NOTE ADULT - ASSESSMENT
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 He has received a total of 3 units PRBC this admission with slow downtrend of hemoglobin. Labs were not consistent with hemolysis and he reported dark stools for which EGD/enteroscopy eventually revealed chronic gastritis and small bowel ectasias. He developed acute respiratory distress and profound sinus tachycardia on 8/13 in setting of Klebsiella bacteremia of unclear origin (preceded EGD) for which CT performed which suggests possible pneumonia. He has clinically improved with antibiotics, however he was found to have worsening abdominal distention and Cdiff infxn and was started on vanc PO as well as IV flagyl. Also notably has anasarca with RUE swelling and b/l LE swelling; RHC on 8/25 confirmed elev filling pressures. Developed oliguric renal failure since 8/22 possibly 2/2 relative hypotension.    Review of pertinent studies  8/2020 labs: Direct Jacky +, 4.6% reticulocytes with low RI, normal bilirubin,  (stable). Haptoglobin normal.   8/25 RHC: RA 13, RV 48/14, PA 49/23/32, PCWP 20, Marino CI/CO 4.2/8.1. MAP 83. 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 He has received a total of 3 units PRBC this admission with slow downtrend of hemoglobin. Labs were not consistent with hemolysis and he reported dark stools for which EGD/enteroscopy eventually revealed chronic gastritis and small bowel ectasias. He developed acute respiratory distress and profound sinus tachycardia on 8/13 in setting of Klebsiella bacteremia of unclear origin (preceded EGD) for which CT performed which suggests possible pneumonia. He has clinically improved with antibiotics, however he was found to have worsening abdominal distention and Cdiff infxn and was started on vanc PO as well as IV flagyl. Also notably has anasarca with RUE swelling and b/l LE swelling; RHC on 8/25 confirmed elev filling pressures. Developed oliguric renal failure since 8/22 possibly 2/2 relative hypotension.     Review of pertinent studies  8/2020 labs: Direct Jacky +, 4.6% reticulocytes with low RI, normal bilirubin,  (stable). Haptoglobin normal.   8/25 RHC: RA 13, RV 48/14, PA 49/23/32, PCWP 20, Marino CI/CO 4.2/8.1. MAP 83.

## 2020-08-26 NOTE — PROGRESS NOTE ADULT - SUBJECTIVE AND OBJECTIVE BOX
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 Patient was treated w/ prednisone and Rituximab. Tacro was discontinued and patient was also transitioned to everolimus  Admitted  6/16-6/19  for hyperglycemia.  Reported to transplant team having one done black tarry stool-  instructed to  present to [facility] for hemolytic anemia. Patient has been following with Hematology outpatient.  Denies CP  N/V diarrhea SOB. (11 Aug 2020 20:08)      Surgery/Hospital Course:  8/11 Admitted to [facility] with symptomatic anemia  8/13 EGD for investigation of tarry stools  8/15 SB capsule: stomach & SB lesions, likely ulcers.  8/17 EGD/push enteroscopy w/ angioectasias/erosions in small bowel. Gastropathy and esophagitis. Ulcer seen on VCE most likely scope trauma.    8/18 VSS transferred back to floor.   8/19 VSS, H/H stable 8.8/24.5, would continue to hold asa per transplant team. Follow up CMV PCR and gastric biopsy.   8/20 VS 9.0/28.4 stable Gastric biopsy results LA Grade A esophagitis.  - Acute gastritis. Biopsies taken to r/o CMV, No ulceration seen despite finding on capsule endoscopy - likely 2/2 scope  trauma that has since resolved. Pathology pending.  8/21 VSS H/H  8.1/25.7  trending down will monitor prednisone taper 30 mg today. Procardia 120 initiated today RH biopsy Monday.   8/22 VVS; Patient reports loose stools x 2-3 days with 1 episode today.  Stool sent for C-dif and GI PCR. Abdominal X-ray revealed: dilated loops of bowel. Abdomen distended.  Patient denies N/V. GI called to re-evaluate. Continue with current medication regimen.  Awaiting for upcoming cardiac biopsy on Monday 8/24.  8/23   vss    creat up to 2.28 ,  repeating CMP,  TTE ordered  Bladder scan   8/24   cardiac bx cancelled   elev creat  to 2.74   cardio renal cslt called,    + CDIF   inc vanco to 500 q6   ID following  8/25 VSS: RHC today as per transplant team; transfuse 2 units PRBC today as per Dr. Viramontes; continue vanco for cdiff;   npo/ bowel rest as per general surgery   transfer patient to CTU after cath 8/25    Today:    REVIEW OF SYSTEMS:  Gen: No fever  EYES/ENT: No visual changes;  No vertigo or throat pain   NECK: No pain   RES:  No shortness of breath or Cough  CARD: No chest pain   GI:  c/o abdominal distension  : No dysuria  NEURO: No weakness  SKIN: No itching, rashes     ICU Vital Signs Last 24 Hrs  T(C): 36.3 (26 Aug 2020 04:00), Max: 36.9 (25 Aug 2020 12:43)  T(F): 97.3 (26 Aug 2020 04:00), Max: 98.4 (25 Aug 2020 12:43)  HR: 116 (26 Aug 2020 05:00) (113 - 120)  BP: 114/40 (25 Aug 2020 17:30) (114/40 - 116/67)  BP(mean): 63 (25 Aug 2020 17:30) (63 - 85)  ABP: 121/51 (26 Aug 2020 05:00) (106/49 - 130/55)  ABP(mean): 73 (26 Aug 2020 05:00) (65 - 78)  RR: 19 (26 Aug 2020 05:00) (14 - 66)  SpO2: 94% (26 Aug 2020 05:00) (93% - 98%)    Physical Exam:  Gen:  Awake, alert   CNS: non focal 	  Neck: no JVD  RES : clear , no wheezing              CVS: Regular  rhythm. Normal S1/S2  Abd: + distended, tender, + BS  Skin: No rash.  Extremities:   RUVALCABA; +2 generalized edema, neg calf tenderness. +4 RUE edema  PPP bilaterally      ============================I/O===========================  I&O's Summary    24 Aug 2020 07:01  -  25 Aug 2020 07:00  --------------------------------------------------------  IN: 800 mL / OUT: 250 mL / NET: 550 mL    25 Aug 2020 07:01  -  26 Aug 2020 06:36  --------------------------------------------------------  IN: 1070 mL / OUT: 1130 mL / NET: -60 mL    ============================ LABS =========================                         9.6    5.83  )-----------( 234      ( 26 Aug 2020 04:54 )             29.9   08-26    137  |  99  |  72<H>  ----------------------------<  66<L>  3.5   |  16<L>  |  2.92<H>    Ca    8.3<L>      26 Aug 2020 01:23  Phos  6.5     08-26  Mg     3.0     08-26    TPro  5.8<L>  /  Alb  3.3  /  TBili  0.8  /  DBili  x   /  AST  40  /  ALT  26  /  AlkPhos  63  08-26    ABG - ( 26 Aug 2020 04:55 )  pH, Arterial: 7.44  pH, Blood: x     /  pCO2: 32    /  pO2: 73    / HCO3: 21    / Base Excess: -1.9  /  SaO2: 94                ======================Micro/Rad/Cardio=================  Culture: Reviewed   CXR: Reviewed  Echo: Reviewed  ======================================================  PAST MEDICAL & SURGICAL HISTORY:  H/O hemolytic anemia  H/O autoimmune hemolytic anemia  Knee pain, right  HLD (hyperlipidemia)  Former smoker  DVT of upper extremity (deep vein thrombosis)  Hepatitis C virus  GIB (gastrointestinal bleeding)  Ventricular fibrillation: s/p AICD  PAF (paroxysmal atrial fibrillation): on xarelto  Non-Ischemic Cardiomyopathy  SVT (Supraventricular Tachycardia)  HTN  CHF (Congestive Heart Failure)  S/P right heart catheterization: biopsy multiple  H/O heart transplant: 2/2018  Status post left hip replacement  History of Prior Ablation Treatment: for afib  AICD (Automatic Cardioverter/Defibrillator) Present: [employer] with 1 [employer] lead4/1/09- explanted and replaced with [employer] 2 leads on 9/2/09    ====================ASSESSMENT ==============  Heart transplant 2/2018 for ACC/AHA stage D NICM   Supraventricular tachycardia  severe hypertension  Klebsiella oxytoca bactermia  Sepsis  acute respiratory failure, resolved  Hyperlactatemia acidosis, resolved  Leukopenia  Acute blood loss anemia, stable  GI Bleed  Cdiff diarrhea  CKD    Plan:  ====================== NEUROLOGY=====================  Patient is nonfocal, continue to monitor neuro status as per ICU protocol. Addressed analgesic regimen to optimize function. Continued mobilization as tolerated.    ==================== RESPIRATORY======================  Comfortable on room air. Continue close monitoring of breathing pattern, respiratory rate, the following of continuous pulse oximetry for support and intermittent blood gas analysis to prevent decompensation.    ====================CARDIOVASCULAR==================  Continue with pravastatin for transplant coronary artery disease prophylaxis. Aspirin on hold given history of gastrointestinal bleeding. Continue with Prednisone taper, patient is off Cellcept while on high dose steroids. Procardia discontinued to allow for higher blood pressure and renal perfusion.    pravastatin 20 milliGRAM(s) Oral at bedtime  predniSONE   Tablet 30 milliGRAM(s) Oral <User Schedule>  ===================HEMATOLOGIC/ONC ===================  Anemia s/p multiple blood transfusions. Two units ordered 8/25. Follow up hemolysis work up ordered, reticulocyte count, haptoglobin, D-dimer, LDH. Monitor hemoglobin and hematocrit levels.    ===================== RENAL =========================  Patient with acute kidney injury on chronic kidney disease. Optimize renal perfusion with adequate volume resuscitation and continued monitoring of urine output, fluid balance, BUN/Creatinine.  Patient has anasarca, but much of fluid is likely extravascular. Lasix ordered with blood transfusions, but would avoid negative fluid balance in light of the acute renal failure. Procardia discontinued as this may have contributed to worsening renal function related to hypotension. Everolimus level sent. Plan to continue though for immunosuppression as discussed with Dr. Viramontes.    ==================== GASTROINTESTINAL===================  Patient is NPO as per general surgery although diagnosis, aside from Cdiff colitis is unclear. Protonix for stress ulcer prophylaxis.     =======================    ENDOCRINE  =====================  Stress hyperglycemia required review and adjustment of regular Insulin sliding scale, lantus and glycemic regimen while following serial glucose levels to help achieve and maintain euglycemia     ========================INFECTIOUS DISEASE================  Clostridium difficile PCR positive, continue with vancomycin PO and metronidazole IVPB. Patient is afebrile.     atovaquone Suspension 1500 milliGRAM(s) Oral daily  metroNIDAZOLE  IVPB      metroNIDAZOLE  IVPB 500 milliGRAM(s) IV Intermittent every 8 hours  posaconazole  Tablet 300 milliGRAM(s) Oral daily  vancomycin    Solution 500 milliGRAM(s) Oral every 6 hours      Patient requires continuous monitoring with bedside rhythm monitoring, pulse ox monitoring, CVP, MAP monitoring and intermittent blood gas analysis. Care plan discussed with ICU care team. Patient remained critical and at risk for life threatening decompensation.     I have spent 35 minutes providing critical care for this acutely ill patient.

## 2020-08-26 NOTE — PROGRESS NOTE ADULT - SUBJECTIVE AND OBJECTIVE BOX
Patient seen and examined at bedside.    Overnight Events: Yest, pt had RHC/EMB and found to have elev filling pressures; subsequently xferred to CTU. This AM, pt has no complaints. Continues to appear VOL on exam.    Review Of Systems: No chest pain, shortness of breath, or palpitations            Current Meds:  acetaminophen   Tablet .. 650 milliGRAM(s) Oral every 6 hours PRN  atovaquone Suspension 1500 milliGRAM(s) Oral daily  chlorhexidine 2% Cloths 1 Application(s) Topical <User Schedule>  cholecalciferol 1000 Unit(s) Oral daily  everolimus (ZORTRESS) 0.5 milliGRAM(s) Oral <User Schedule>  folic acid 1 milliGRAM(s) Oral daily  heparin   Injectable 5000 Unit(s) SubCutaneous every 8 hours  insulin glargine Injectable (LANTUS) 10 Unit(s) SubCutaneous at bedtime  insulin lispro (HumaLOG) corrective regimen sliding scale   SubCutaneous three times a day before meals  insulin lispro Injectable (HumaLOG) 3 Unit(s) SubCutaneous three times a day before meals  magnesium oxide 400 milliGRAM(s) Oral daily  metroNIDAZOLE  IVPB      metroNIDAZOLE  IVPB 500 milliGRAM(s) IV Intermittent every 8 hours  pantoprazole  Injectable 40 milliGRAM(s) IV Push two times a day  posaconazole DR Tablet 300 milliGRAM(s) Oral daily  pravastatin 20 milliGRAM(s) Oral at bedtime  predniSONE   Tablet 30 milliGRAM(s) Oral <User Schedule>  sodium chloride 0.9% lock flush 3 milliLiter(s) IV Push every 8 hours  vancomycin    Solution 500 milliGRAM(s) Oral every 6 hours      Vitals:  T(F): 97.7 (), Max: 98.4 ()  HR: 121 () (113 - 127)  BP: 114/40 () (114/40 - 116/67)  RR: 18 (-)  SpO2: 96% ()  I&O's Summary    25 Aug 2020 07:01  -  26 Aug 2020 07:00  --------------------------------------------------------  IN: 1070 mL / OUT: 1215 mL / NET: -145 mL    26 Aug 2020 07:01  -  26 Aug 2020 12:30  --------------------------------------------------------  IN: 100 mL / OUT: 280 mL / NET: -180 mL        Physical Exam:  Gen: NAD.  HEENT: NCAT.  Neck: Mod JVP elev. CVC in place.  CV: Normal S1, S2. RRR. No MRG.  Chest: CTAB. No WRR.  Abd: +BSx4. Soft. NTND.  Ext: 2+ LE edema.  Skin: No cyanosis.                          9.6    5.83  )-----------( 234      ( 26 Aug 2020 04:54 )             29.9     08-26    137  |  99  |  72<H>  ----------------------------<  66<L>  3.5   |  16<L>  |  2.92<H>    Ca    8.3<L>      26 Aug 2020 01:23  Phos  6.5     08  Mg     3.0     -    TPro  5.8<L>  /  Alb  3.3  /  TBili  0.8  /  DBili  x   /  AST  40  /  ALT  26  /  AlkPhos  63  08-26    PT/INR - ( 25 Aug 2020 19:15 )   PT: 12.0 sec;   INR: 1.01 ratio         PTT - ( 25 Aug 2020 19:15 )  PTT:25.8 sec    Cath: < from: Cardiac Cath Lab - Adult (20 @ 14:45) >  Pressures:  Baseline  Pressures:  - HR: 113  Pressures:  - Rhythm:  Pressures:  -- Pulmonary Artery (S/D/M): 49/23/32  Pressures:  -- Pulmonary Capillary Wedge: 20/20/20  Pressures:  -- Right Atrium (a/v/M): 13/18/13  Pressures:  -- Right Ventricle (s/edp): 48/14/--  O2 Sats:  Baseline  O2 Sats:  - HR: 113  O2 Sats:  - Rhythm:  O2 Sats:  -- AO: 7.2/97/9.5  O2 Sats:  -- PA: 7.2/64.2/6.29  Outputs:  Baseline  Outputs:  -- CALCULATIONS[age]  Outputs:  -- CALCULATIONS: Body Surface Area: 1.95  Outputs:  -- CALCULATIONS: Height in cm: 173.00  Outputs:  -- CALCULATIONS: Sex: Male  Outputs:  -- CALCULATIONS: Weight in k.20  Outputs:  -- OUTPUTS: Blood Oxygen Difference: 3.21  Outputs:  -- OUTPUTS: CO by Marino: 8.14  Outputs:  -- OUTPUTS: Marino cardiac index: 4.17  Outputs:  -- OUTPUTS: O2 consumption: 261.30  Outputs:  -- OUTPUTS: Vo2 Indexed: 133.85  Outputs:  -- RESISTANCES: Pulmonary vascular index (dsc): 287.87  Outputs:  -- RESISTANCES: Pulmonary vascular index (Wood Units): 3.60  Outputs:  -- RESISTANCES: Pulmonary vascular resistance (dsc): 147.46  Outputs:  -- RESISTANCES: Pulmonary vascular resistance (Wood Units): 1.84  Outputs:  -- RESISTANCES: Right ventricular stroke work: 16.65  Outputs:  -- RESISTANCES: Right ventricular stroke work index: 8.53  Outputs:  -- RESISTANCES: Total pulmonary index (dsc): 575.73  Outputs:  -- RESISTANCES: Total pulmonary index (Wood Units): 7.20  Outputs:  -- RESISTANCES: Total pulmonary resistance (dsc): 294.92  Outputs:  -- RESISTANCES: Total pulmonary resistance (Wood Units): 3.69  Outputs:  -- SHUNTS: Qs Indexed: 4.17  Outputs:  -- SHUNTS: Systemic flow: 8.14

## 2020-08-26 NOTE — DISCHARGE NOTE ADULT - NS AS DC FU INST LIST INST
yes Complex Repair And M Plasty Text: The defect edges were debeveled with a #15 scalpel blade.  The primary defect was closed partially with a complex linear closure.  Given the location of the remaining defect, shape of the defect and the proximity to free margins an M plasty was deemed most appropriate for complete closure of the defect.  Using a sterile surgical marker, an appropriate advancement flap was drawn incorporating the defect and placing the expected incisions within the relaxed skin tension lines where possible.    The area thus outlined was incised deep to adipose tissue with a #15 scalpel blade.  The skin margins were undermined to an appropriate distance in all directions utilizing iris scissors.

## 2020-08-26 NOTE — PROGRESS NOTE ADULT - ASSESSMENT
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 He has responded appropriately to a single blood transfusion. Of note, he recently has developed dark colored stools. Will investigate if anemia is due to GI bleeding in setting of steroid use or hemolysis and manage appropriately. Underwent UGI without bleeding source identified. Developed Klebsiella oxytoca bacteremia requiring transfer to CTU. Clinically improving, transferred to [facility], finished 10-day of ceftriaxone, however, developed severe C.diff colitis on Vancomycin 500mg q6h PO and IV Flagyl. He had RHC on 8/25 and found to have high filling pressure so transferred to CTU again.    #C.diff PCR detected (8/23): severe C.diff colitis  - Overnight 8/22 developed multiple episodes of watery diarrhea  - Abdominal distension noted on exam  - GI- PCR was negative for pathogens  - Abdominal CT(8/23): Rectal wall thickening. New since the previous exam. Correlate clinically for proctitis. Mild distention of the colon proximal to this. No small bowel obstruction.  - WBC=7.18, significant NORMAN (Cr: 1.03 > 2.74), albumin=3.6 (8/23)  - Due to significant NORMAN >50% Cr increase, meets criteria of severe C.diff infection. c/w PO Vancomycin to 500mg q6h (from 125mg q6h) + IV flagyl  - Trend daily CBC, CMP (for albumin), fever curve  - Surgery following, no acute OR    #Klebsiella oxytoca bacteremia:  Meropenem (8/14-8/17)  Cefepime (8/17-20)  Ceftriaxone (8/20-)  - Growing in blood cultures 2/2 sets on 8/13  - Unclear source  - Repeat blood cultures x2 sets 8/14 no growth final  - s/p ceftriaxone until Monday 8/24 to finish a 10-day course    #OI prophylaxis-  -has been receiving high dose steroids since 5/20  -CMV viral load(8/17): neg  -Valcyte and Bactrim d/c'ed on 8/17 due to leukopenia  -Galactomann<0.5, Fungitell<31  -c/w Atovaquone 1500mg daily for PCP ppx given on steroid   -On Posaconazole  -Per hemoc, will taper prednisone every 7 days by 10mg    #Pancytopenia- on admission  -Remains with significant leukopenia- especially lymphocyte lines  -COVID PCR neg  -Tick panel neg  -would repeat chest CT scan to see status of prior RUL scarring vs. nodule once patient is stable    #Anemia- r/o GI bleeding    EGD 8/17: esophagitis, acute gastritis s/p biopsy, no ulceration  Consider PPI or H2b    Lobo Mckoy MD, PGY4   ID fellow  Pager: [phone]  After 5pm/weekends call [phone]

## 2020-08-26 NOTE — PROGRESS NOTE ADULT - PROBLEM SELECTOR PLAN 2
-S/p mult pRBC this admission; 2u pRBC o/n  -Repeat EGD/push enteroscopy w/ angioectasias/erosions. No e/o continued GIB.  -GI cleared use of ASA, but will hold for now given GIB and lack of strong indication  -Appreciate heme recs, no signs of hemolysis at this time  -Tapering prednisone per hematology: 30 mg daily (started 8/21) followed by 10 mg decrease every 7 days  -C/w PPI daily  -Unclear ongoing dropping H/H; recheck hemolysis labs

## 2020-08-26 NOTE — PROGRESS NOTE ADULT - PROBLEM SELECTOR PLAN 7
-Appr ID input  -Cdiff PCR positive  -C/w vanc PO and metronidazole  -Contact precautions  -Appr gen sx input  -Consider vanc enemas as per ID

## 2020-08-26 NOTE — PROGRESS NOTE ADULT - ATTENDING COMMENTS
Patient seen and examined with Dr. sams    I agree with his interval history exam and plans as noted above    Patient remains with abdominal distension and 4-5 episodes of watery diarrhea today, elevated creatinine seems to have plateaued  Would continue to monitor clinical exam and serial abdominal X-rays  Continue the oral Vancomycin 500mg qid plus the IV flagyl 500mg q 8hr  steroid taper  Sever C.diff colitis in an immunocompromised host, but without progression or need for surgical intervention at this point    Gary Lujan MD  [phone]  After 5pm/weekends [phone]

## 2020-08-26 NOTE — PROGRESS NOTE ADULT - PROBLEM SELECTOR PLAN 1
-C/w statin for TCAD ppx  -ASA on hold given GIB  -C/w everolimus 0.5mg PO BID; goal 8-10. Check trough soon.  -Steroid taper as written   -C/w posiconazole for ppx; Bactrim and Valcyte on hold given leukopenia  -Off Cellcept while on high dose steroids and now Cdiff infxn  -Pt remains VOL on exam and w/ elev CVP; would diurese. Can give furosemide 40mg IV x 1 and assess response. Aim for net neg 1-2L/day.  -Check BMP BID  -Please re-send CMV PCR -C/w statin for TCAD ppx  -ASA on hold given GIB  -C/w everolimus 0.5mg PO BID; goal 8-10. Level pending  -Steroid taper as written   -C/w posiconazole for ppx; Bactrim and Valcyte on hold given leukopenia  -Off Cellcept while on high dose steroids and now Cdiff infxn  -Pt remains VOL on exam and w/ variable CVP; Aim for net neg 1-2L/day.  -Check BMP BID  -Please re-send CMV PCR

## 2020-08-26 NOTE — PROGRESS NOTE ADULT - PROBLEM SELECTOR PLAN 9
-Please obtain RUE duplex to r/o DVT
-Please obtain RUE duplex to r/o DVT
-RUE duplex to r/o DVT neg

## 2020-08-26 NOTE — PROGRESS NOTE ADULT - ATTENDING COMMENTS
Patient seen/examined.  Agree w above note and plan and have discussed plan w house staff.  Comfortable, diarrhea persists.  Abdomen distended, softer than yesterday, non-tender.  Suggest liquids, advance diet slowly    Ayden Mendoza MD

## 2020-08-26 NOTE — PROGRESS NOTE ADULT - ATTENDING COMMENTS
I have seen this patient with the fellow and agree with their assessment and plan. In addition,    Patient received 2 units of PRBC yesterday. RHC done yesterday showed PCWP of 20.    # NORMAN/ ATN - Baseline serum creatinine 1.0, now up to 2.9. UA - no evidence of blood or WBC. Urine Na<35, suggestive of low effective arterial blood volume.  No uremic symptoms. No acute indication for dialysis.    # Edema/volume overload - continue IV diuretics (goal I/O net negative 1 -1.5L).    # Anemia - S/p 2 units PRBC. Continue to monitor Hb level.     # Metabolic acidosis - secondary to NORMAN and diarrhea. We will continue to monitor. If persistently low, we will consider starting sodium bicarb.     # Medication toxicity Monitoring: medication dose reviewed. Please dose medications to CrCl<15    # Hypokalemia - Serum potassium is 3.5. He likely had potassium loss through diarrhea. Replete K to keep K ~4.0    The rest of the recommendations as per fellow's note.    Umm Fang MD  Attending Nephrologist  [phone]

## 2020-08-27 LAB
ALBUMIN SERPL ELPH-MCNC: 3.2 G/DL — LOW (ref 3.3–5)
ALP SERPL-CCNC: 64 U/L — SIGNIFICANT CHANGE UP (ref 40–120)
ALT FLD-CCNC: 22 U/L — SIGNIFICANT CHANGE UP (ref 10–45)
ANION GAP SERPL CALC-SCNC: 15 MMOL/L — SIGNIFICANT CHANGE UP (ref 5–17)
ANION GAP SERPL CALC-SCNC: 17 MMOL/L — SIGNIFICANT CHANGE UP (ref 5–17)
APTT BLD: 28 SEC — SIGNIFICANT CHANGE UP (ref 27.5–35.5)
AST SERPL-CCNC: 31 U/L — SIGNIFICANT CHANGE UP (ref 10–40)
BILIRUB SERPL-MCNC: 0.8 MG/DL — SIGNIFICANT CHANGE UP (ref 0.2–1.2)
BUN SERPL-MCNC: 66 MG/DL — HIGH (ref 7–23)
BUN SERPL-MCNC: 72 MG/DL — HIGH (ref 7–23)
CALCIUM SERPL-MCNC: 8.3 MG/DL — LOW (ref 8.4–10.5)
CALCIUM SERPL-MCNC: 8.4 MG/DL — SIGNIFICANT CHANGE UP (ref 8.4–10.5)
CHLORIDE SERPL-SCNC: 102 MMOL/L — SIGNIFICANT CHANGE UP (ref 96–108)
CHLORIDE SERPL-SCNC: 99 MMOL/L — SIGNIFICANT CHANGE UP (ref 96–108)
CO2 SERPL-SCNC: 18 MMOL/L — LOW (ref 22–31)
CO2 SERPL-SCNC: 21 MMOL/L — LOW (ref 22–31)
CREAT SERPL-MCNC: 2.65 MG/DL — HIGH (ref 0.5–1.3)
CREAT SERPL-MCNC: 2.86 MG/DL — HIGH (ref 0.5–1.3)
GAS PNL BLDA: SIGNIFICANT CHANGE UP
GLUCOSE BLDC GLUCOMTR-MCNC: 110 MG/DL — HIGH (ref 70–99)
GLUCOSE BLDC GLUCOMTR-MCNC: 127 MG/DL — HIGH (ref 70–99)
GLUCOSE BLDC GLUCOMTR-MCNC: 79 MG/DL — SIGNIFICANT CHANGE UP (ref 70–99)
GLUCOSE SERPL-MCNC: 119 MG/DL — HIGH (ref 70–99)
GLUCOSE SERPL-MCNC: 68 MG/DL — LOW (ref 70–99)
HCT VFR BLD CALC: 31.5 % — LOW (ref 39–50)
HGB BLD-MCNC: 10.3 G/DL — LOW (ref 13–17)
INR BLD: 1.05 RATIO — SIGNIFICANT CHANGE UP (ref 0.88–1.16)
MAGNESIUM SERPL-MCNC: 3 MG/DL — HIGH (ref 1.6–2.6)
MCHC RBC-ENTMCNC: 29.9 PG — SIGNIFICANT CHANGE UP (ref 27–34)
MCHC RBC-ENTMCNC: 32.7 GM/DL — SIGNIFICANT CHANGE UP (ref 32–36)
MCV RBC AUTO: 91.3 FL — SIGNIFICANT CHANGE UP (ref 80–100)
NRBC # BLD: 0 /100 WBCS — SIGNIFICANT CHANGE UP (ref 0–0)
PHOSPHATE SERPL-MCNC: 5.5 MG/DL — HIGH (ref 2.5–4.5)
PLATELET # BLD AUTO: 228 K/UL — SIGNIFICANT CHANGE UP (ref 150–400)
POTASSIUM SERPL-MCNC: 3.5 MMOL/L — SIGNIFICANT CHANGE UP (ref 3.5–5.3)
POTASSIUM SERPL-MCNC: 3.5 MMOL/L — SIGNIFICANT CHANGE UP (ref 3.5–5.3)
POTASSIUM SERPL-SCNC: 3.5 MMOL/L — SIGNIFICANT CHANGE UP (ref 3.5–5.3)
POTASSIUM SERPL-SCNC: 3.5 MMOL/L — SIGNIFICANT CHANGE UP (ref 3.5–5.3)
PROT SERPL-MCNC: 5.6 G/DL — LOW (ref 6–8.3)
PROTHROM AB SERPL-ACNC: 12.5 SEC — SIGNIFICANT CHANGE UP (ref 10.6–13.6)
RBC # BLD: 3.45 M/UL — LOW (ref 4.2–5.8)
RBC # FLD: 19 % — HIGH (ref 10.3–14.5)
SODIUM SERPL-SCNC: 135 MMOL/L — SIGNIFICANT CHANGE UP (ref 135–145)
SODIUM SERPL-SCNC: 137 MMOL/L — SIGNIFICANT CHANGE UP (ref 135–145)
WBC # BLD: 4.8 K/UL — SIGNIFICANT CHANGE UP (ref 3.8–10.5)
WBC # FLD AUTO: 4.8 K/UL — SIGNIFICANT CHANGE UP (ref 3.8–10.5)

## 2020-08-27 PROCEDURE — 99232 SBSQ HOSP IP/OBS MODERATE 35: CPT

## 2020-08-27 PROCEDURE — 99233 SBSQ HOSP IP/OBS HIGH 50: CPT | Mod: GC

## 2020-08-27 PROCEDURE — 99291 CRITICAL CARE FIRST HOUR: CPT

## 2020-08-27 PROCEDURE — 71045 X-RAY EXAM CHEST 1 VIEW: CPT | Mod: 26,77

## 2020-08-27 PROCEDURE — 99232 SBSQ HOSP IP/OBS MODERATE 35: CPT | Mod: GC

## 2020-08-27 PROCEDURE — 74018 RADEX ABDOMEN 1 VIEW: CPT | Mod: 26

## 2020-08-27 PROCEDURE — 71045 X-RAY EXAM CHEST 1 VIEW: CPT | Mod: 26

## 2020-08-27 PROCEDURE — 99292 CRITICAL CARE ADDL 30 MIN: CPT

## 2020-08-27 RX ORDER — EVEROLIMUS 10 MG/1
0.25 TABLET ORAL
Refills: 0 | Status: DISCONTINUED | OUTPATIENT
Start: 2020-08-27 | End: 2020-08-28

## 2020-08-27 RX ORDER — BENZOCAINE AND MENTHOL 5; 1 G/100ML; G/100ML
1 LIQUID ORAL EVERY 6 HOURS
Refills: 0 | Status: DISCONTINUED | OUTPATIENT
Start: 2020-08-27 | End: 2020-09-23

## 2020-08-27 RX ORDER — FUROSEMIDE 40 MG
40 TABLET ORAL ONCE
Refills: 0 | Status: COMPLETED | OUTPATIENT
Start: 2020-08-27 | End: 2020-08-27

## 2020-08-27 RX ORDER — INSULIN GLARGINE 100 [IU]/ML
5 INJECTION, SOLUTION SUBCUTANEOUS AT BEDTIME
Refills: 0 | Status: DISCONTINUED | OUTPATIENT
Start: 2020-08-27 | End: 2020-08-27

## 2020-08-27 RX ORDER — MEPERIDINE HYDROCHLORIDE 50 MG/ML
25 INJECTION INTRAMUSCULAR; INTRAVENOUS; SUBCUTANEOUS ONCE
Refills: 0 | Status: DISCONTINUED | OUTPATIENT
Start: 2020-08-27 | End: 2020-08-27

## 2020-08-27 RX ORDER — SODIUM CHLORIDE 9 MG/ML
1000 INJECTION, SOLUTION INTRAVENOUS
Refills: 0 | Status: DISCONTINUED | OUTPATIENT
Start: 2020-08-27 | End: 2020-09-01

## 2020-08-27 RX ORDER — SODIUM CHLORIDE 9 MG/ML
1000 INJECTION INTRAMUSCULAR; INTRAVENOUS; SUBCUTANEOUS
Refills: 0 | Status: DISCONTINUED | OUTPATIENT
Start: 2020-08-27 | End: 2020-08-27

## 2020-08-27 RX ORDER — POTASSIUM CHLORIDE 20 MEQ
10 PACKET (EA) ORAL
Refills: 0 | Status: COMPLETED | OUTPATIENT
Start: 2020-08-27 | End: 2020-08-27

## 2020-08-27 RX ORDER — CHLORHEXIDINE GLUCONATE 213 G/1000ML
15 SOLUTION TOPICAL EVERY 12 HOURS
Refills: 0 | Status: DISCONTINUED | OUTPATIENT
Start: 2020-08-27 | End: 2020-08-27

## 2020-08-27 RX ADMIN — SODIUM CHLORIDE 3 MILLILITER(S): 9 INJECTION INTRAMUSCULAR; INTRAVENOUS; SUBCUTANEOUS at 21:41

## 2020-08-27 RX ADMIN — SODIUM CHLORIDE 30 MILLILITER(S): 9 INJECTION, SOLUTION INTRAVENOUS at 13:57

## 2020-08-27 RX ADMIN — PANTOPRAZOLE SODIUM 40 MILLIGRAM(S): 20 TABLET, DELAYED RELEASE ORAL at 05:09

## 2020-08-27 RX ADMIN — HEPARIN SODIUM 5000 UNIT(S): 5000 INJECTION INTRAVENOUS; SUBCUTANEOUS at 05:09

## 2020-08-27 RX ADMIN — Medication 50 MILLIEQUIVALENT(S): at 10:12

## 2020-08-27 RX ADMIN — SODIUM CHLORIDE 3 MILLILITER(S): 9 INJECTION INTRAMUSCULAR; INTRAVENOUS; SUBCUTANEOUS at 05:11

## 2020-08-27 RX ADMIN — MAGNESIUM OXIDE 400 MG ORAL TABLET 400 MILLIGRAM(S): 241.3 TABLET ORAL at 13:56

## 2020-08-27 RX ADMIN — PANTOPRAZOLE SODIUM 40 MILLIGRAM(S): 20 TABLET, DELAYED RELEASE ORAL at 17:27

## 2020-08-27 RX ADMIN — Medication 50 MILLIEQUIVALENT(S): at 10:57

## 2020-08-27 RX ADMIN — Medication 500 MILLIGRAM(S): at 21:18

## 2020-08-27 RX ADMIN — Medication 100 MILLIGRAM(S): at 17:27

## 2020-08-27 RX ADMIN — SODIUM CHLORIDE 3 MILLILITER(S): 9 INJECTION INTRAMUSCULAR; INTRAVENOUS; SUBCUTANEOUS at 13:31

## 2020-08-27 RX ADMIN — HEPARIN SODIUM 5000 UNIT(S): 5000 INJECTION INTRAVENOUS; SUBCUTANEOUS at 13:57

## 2020-08-27 RX ADMIN — Medication 1 MILLIGRAM(S): at 13:55

## 2020-08-27 RX ADMIN — CHLORHEXIDINE GLUCONATE 1 APPLICATION(S): 213 SOLUTION TOPICAL at 05:10

## 2020-08-27 RX ADMIN — CHLORHEXIDINE GLUCONATE 5 MILLILITER(S): 213 SOLUTION TOPICAL at 09:24

## 2020-08-27 RX ADMIN — EVEROLIMUS 0.25 MILLIGRAM(S): 10 TABLET ORAL at 21:18

## 2020-08-27 RX ADMIN — POSACONAZOLE 300 MILLIGRAM(S): 100 TABLET, DELAYED RELEASE ORAL at 13:31

## 2020-08-27 RX ADMIN — Medication 50 MILLIEQUIVALENT(S): at 17:27

## 2020-08-27 RX ADMIN — Medication 50 MILLIEQUIVALENT(S): at 18:46

## 2020-08-27 RX ADMIN — Medication 500 MILLIGRAM(S): at 01:10

## 2020-08-27 RX ADMIN — Medication 40 MILLIGRAM(S): at 03:48

## 2020-08-27 RX ADMIN — Medication 100 MILLIGRAM(S): at 08:48

## 2020-08-27 RX ADMIN — BENZOCAINE AND MENTHOL 1 LOZENGE: 5; 1 LIQUID ORAL at 22:54

## 2020-08-27 RX ADMIN — HEPARIN SODIUM 5000 UNIT(S): 5000 INJECTION INTRAVENOUS; SUBCUTANEOUS at 22:54

## 2020-08-27 RX ADMIN — Medication 100 MILLIGRAM(S): at 01:10

## 2020-08-27 RX ADMIN — Medication 1000 UNIT(S): at 13:06

## 2020-08-27 RX ADMIN — ATOVAQUONE 1500 MILLIGRAM(S): 750 SUSPENSION ORAL at 13:54

## 2020-08-27 RX ADMIN — Medication 500 MILLIGRAM(S): at 13:06

## 2020-08-27 NOTE — PROGRESS NOTE ADULT - ASSESSMENT
70y old  Male with pmhx of  NICM s/p HM2 s/p OHT in 2018, autoimmune hemolytic anemia s/p steroids/rituxan, admitted for GIB c/b bacteremia, cdiff, and now NORMAN.    #NORMAN  - Etiology of NORMAN like multifactorial 2/2 infection, diarrhea, everolimus   - His baseline Scr ~1mg/dl, peaked at 3mg/dl on 8/25, now plateaued around 2.9mg/dl  - UA with 30 protein, no WBC or RBC, Urine Na<35, Urine Cl<35  - Urine studies consistent with CRS picture, could have ATN as well given the relative hypotension, lowest documented BP- 102/70. Last Everolimus trough was on 8/20 and noted to be elevated at ~10  - RHC on 8/25 with elevated pressures, given 2u pRBC and 40mg IV lasix with good response  - F/u HF team recs, agree with IV diuresis at this time as need for volume optimization, non-oliguric on diuretics  - F/u everolimus level, may need to adjust dose in setting of diarrhea/NORMAN  - Monitor UOP and daily weights. Dose medications as per eGFR<20      #Azotemia  - 2/2 NORMAN and steroids, no evidence of uremia    #Metabolic Acidosis  - HAGMA with respiratory alkalosis   - Likely 2/2 NORMAN  - Monitor at this time    #Anemia  - 2/2 GIB, Hgb 9.7 s/p 2 units on 8/25 with appropriate response  - Iron stores low, cannot give IV iron in setting of active infection  - pRBC transfusions per primary team  - No signs of active hemolysis

## 2020-08-27 NOTE — PROGRESS NOTE ADULT - SUBJECTIVE AND OBJECTIVE BOX
[facility] DIVISION OF KIDNEY DISEASES AND HYPERTENSION -- FOLLOW UP NOTE  --------------------------------------------------------------------------------  Ritchie Sykes   Nephrology Fellow  Pager NS: [phone]/ [facility]: [phone]  (After 5 pm or on weekends please page the on-call fellow)    24 hour events/subjective: Patient seen and examined at the bedside. Transferred out of CTU. Received additional 40mg IV lasix overnight. 1.4L of UOP in 24hrs. Still having loose BM's, one overnight.        PAST HISTORY  --------------------------------------------------------------------------------  No significant changes to PMH, PSH, FHx, SHx, unless otherwise noted    ALLERGIES & MEDICATIONS  --------------------------------------------------------------------------------  Allergies    No Known Allergies    Intolerances      Standing Inpatient Medications  atovaquone Suspension 1500 milliGRAM(s) Oral daily  chlorhexidine 0.12% Liquid 5 milliLiter(s) Oral Mucosa every 4 hours  chlorhexidine 2% Cloths 1 Application(s) Topical <User Schedule>  cholecalciferol 1000 Unit(s) Oral daily  everolimus (ZORTRESS) 0.5 milliGRAM(s) Oral <User Schedule>  folic acid 1 milliGRAM(s) Oral daily  heparin   Injectable 5000 Unit(s) SubCutaneous every 8 hours  insulin glargine Injectable (LANTUS) 10 Unit(s) SubCutaneous at bedtime  insulin lispro (HumaLOG) corrective regimen sliding scale   SubCutaneous three times a day before meals  insulin lispro Injectable (HumaLOG) 3 Unit(s) SubCutaneous three times a day before meals  magnesium oxide 400 milliGRAM(s) Oral daily  meperidine     Injectable 25 milliGRAM(s) IV Push once  metroNIDAZOLE  IVPB      metroNIDAZOLE  IVPB 500 milliGRAM(s) IV Intermittent every 8 hours  multiple electrolytes Injection Type 1 1000 milliLiter(s) IV Continuous <Continuous>  pantoprazole  Injectable 40 milliGRAM(s) IV Push two times a day  posaconazole DR Tablet 300 milliGRAM(s) Oral daily  pravastatin 20 milliGRAM(s) Oral at bedtime  predniSONE   Tablet 30 milliGRAM(s) Oral <User Schedule>  sodium chloride 0.9% lock flush 3 milliLiter(s) IV Push every 8 hours  vancomycin    Solution 500 milliGRAM(s) Oral every 6 hours    PRN Inpatient Medications  acetaminophen   Tablet .. 650 milliGRAM(s) Oral every 6 hours PRN      REVIEW OF SYSTEMS  --------------------------------------------------------------------------------  Gen: No fevers/chills  Head/Eyes/Ears/Mouth: No headache; Normal hearing; Normal vision    Respiratory: No dyspnea, cough  CV: No chest pain  GI: + abdominal distention, diarrhea  : No increased frequency, dysuria  MSK: + edema    All other systems were reviewed and are negative, except as noted.    >>> <<<    VITALS/PHYSICAL EXAM  --------------------------------------------------------------------------------  T(C): 36.4 (08-27-20 @ 10:10), Max: 37.2 (08-26-20 @ 16:00)  HR: 129 (08-27-20 @ 10:10) (117 - 131)  BP: 96/65 (08-27-20 @ 10:10) (96/65 - 96/65)  RR: 24 (08-27-20 @ 10:10) (12 - 39)  SpO2: 95% (08-27-20 @ 10:10) (92% - 97%)  Wt(kg): --        08-26-20 @ 07:01  -  08-27-20 @ 07:00  --------------------------------------------------------  IN: 320 mL / OUT: 1420 mL / NET: -1100 mL    08-27-20 @ 07:01  -  08-27-20 @ 13:33  --------------------------------------------------------  IN: 130 mL / OUT: 80 mL / NET: 50 mL      Physical Exam:    	Gen: NAD, well-appearing  	HEENT: Anicteric   	Pulm: CTAB anteriorly  	CV: Tachycardic   	Abd: +BS, soft, mild TPP/ distended       	: + diaz with urine  	MSK: Warm, Anasarca appreciated  	Neuro: No focal deficits, AOX3, no asterixis       	Skin: no visible rashes  	Psych: Appropriate Affect  	Vascular Access: None      LABS/STUDIES  --------------------------------------------------------------------------------              10.3   4.80  >-----------<  228      [08-27-20 @ 00:26]              31.5     135  |  99  |  72  ----------------------------<  68      [08-27-20 @ 12:24]  3.5   |  21  |  2.86        Ca     8.3     [08-27-20 @ 12:24]      Mg     3.0     [08-27-20 @ 00:26]      Phos  5.5     [08-27-20 @ 00:26]    TPro  5.6  /  Alb  3.2  /  TBili  0.8  /  DBili  x   /  AST  31  /  ALT  22  /  AlkPhos  64  [08-27-20 @ 00:26]    PT/INR: PT 12.5 , INR 1.05       [08-27-20 @ 00:26]  PTT: 28.0       [08-27-20 @ 00:26]          [08-26-20 @ 01:23]    Creatinine Trend:  SCr 2.86 [08-27 @ 12:24]  SCr 2.65 [08-27 @ 00:26]  SCr 2.96 [08-26 @ 14:35]  SCr 2.92 [08-26 @ 01:23]  SCr 2.92 [08-25 @ 19:15]    Urinalysis - [08-24-20 @ 21:27]      Color Yellow / Appearance Slightly Turbid / SG 1.018 / pH 5.5      Gluc Negative / Ketone Negative  / Bili Negative / Urobili <2 mg/dL       Blood Negative / Protein 30 mg/dL / Leuk Est Negative / Nitrite Negative      RBC 1 / WBC 3 / Hyaline 2 / Gran  / Sq Epi  / Non Sq Epi 2 / Bacteria Negative    Urine Creatinine 188      [08-24-20 @ 17:29]  Urine Sodium <35      [08-24-20 @ 17:29]  Urine Chloride <35      [08-24-20 @ 17:29]    Iron 36, TIBC 218, %sat 16      [08-15-20 @ 21:08]  Ferritin 764      [08-15-20 @ 21:12]  Vitamin D (25OH) 33.5      [04-30-20 @ 09:06]  HbA1c 4.7      [11-07-18 @ 23:18]  TSH 1.22      [08-12-20 @ 00:18]

## 2020-08-27 NOTE — PROGRESS NOTE ADULT - ASSESSMENT
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 He has received a total of 3 units PRBC this admission with slow downtrend of hemoglobin. Labs were not consistent with hemolysis and he reported dark stools for which EGD/enteroscopy eventually revealed chronic gastritis and small bowel ectasias. He developed acute respiratory distress and profound sinus tachycardia on 8/13 in setting of Klebsiella bacteremia of unclear origin (preceded EGD) for which CT performed which suggests possible pneumonia. He has clinically improved with antibiotics, however he was found to have worsening abdominal distention and Cdiff infxn and was started on vanc PO as well as IV flagyl. Also notably has anasarca with RUE swelling and b/l LE swelling; RHC on 8/25 confirmed elev filling pressures. Developed oliguric renal failure since 8/22 possibly 2/2 relative hypotension.     Review of pertinent studies  8/2020 labs: Direct Jacky +, 4.6% reticulocytes with low RI, normal bilirubin,  (stable). Haptoglobin normal.   8/25 RHC: RA 13, RV 48/14, PA 49/23/32, PCWP 20, Marino CI/CO 4.2/8.1. MAP 83. 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 He has received a total of 3 units PRBC this admission with slow downtrend of hemoglobin. Labs were not consistent with hemolysis and he reported dark stools for which EGD/enteroscopy eventually revealed chronic gastritis and small bowel ectasias. He developed acute respiratory distress and profound sinus tachycardia on 8/13 in setting of Klebsiella bacteremia of unclear origin (preceded EGD) for which CT performed which suggests possible pneumonia. He has clinically improved with antibiotics, however he was found to have worsening abdominal distention and Cdiff infxn and was started on vanc PO as well as IV flagyl. Also notably has anasarca with RUE swelling and b/l LE swelling; RHC on 8/25 confirmed elev filling pressures, however at bedside CVP 8-10. Developed oliguric renal failure since 8/22 possibly 2/2 relative hypotension, improving. Has worsening abdominal distention with finding of ileus.     Review of pertinent studies  8/2020 labs: Direct Jacky +, 4.6% reticulocytes with low RI, normal bilirubin,  (stable). Haptoglobin normal.   8/25 RHC: RA 13, RV 48/14, PA 49/23/32, PCWP 20, Marino CI/CO 4.2/8.1. MAP 83.

## 2020-08-27 NOTE — PROGRESS NOTE ADULT - PROBLEM SELECTOR PLAN 2
-S/p mult pRBC this admission; 2u pRBC o/n  -Repeat EGD/push enteroscopy w/ angioectasias/erosions. No e/o continued GIB.  -GI cleared use of ASA, but will hold for now given GIB and lack of strong indication  -Appreciate heme recs, no signs of hemolysis at this time  -Tapering prednisone per hematology: 30 mg daily (started 8/21) followed by 10 mg decrease every 7 days  -Would consider reduce PPI to daily dosing  -Unclear ongoing dropping H/H; recheck hemolysis labs -S/p mult pRBC this admission; 2u pRBC o/n  -Repeat EGD/push enteroscopy w/ angioectasias/erosions. No e/o continued GIB.  -GI cleared use of ASA, but will hold for now given GIB and lack of strong indication  -Appreciate heme recs, no signs of hemolysis at this time  -Tapering prednisone per hematology: 30 mg daily (started 8/21) followed by 10 mg decrease every 7 days; switch to solumedrol given ileus  -Would consider reduce PPI to daily dosing  -Unclear ongoing dropping H/H; recheck hemolysis labs

## 2020-08-27 NOTE — PROGRESS NOTE ADULT - ATTENDING COMMENTS
Patient seen/examined.  Agree w above note and plan and have discussed plan w house staff.  Comfortable, diarrhea slowing down.  Abdomen distended, more so than yesterday, still non-tender.  Would back diet down to sips clears till less distended    Ayden Mendoza MD

## 2020-08-27 NOTE — PROGRESS NOTE ADULT - SUBJECTIVE AND OBJECTIVE BOX
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 Patient was treated w/ prednisone and Rituximab. Tacro was discontinued and patient was also transitioned to everolimus  Admitted  6/16-6/19  for hyperglycemia.  Reported to transplant team having one done black tarry stool-  instructed to  present to [facility] for hemolytic anemia. Patient has been following with Hematology outpatient.  Denies CP  N/V diarrhea SOB. (11 Aug 2020 20:08)    Surgery/Hospital course:  8/11 Admitted to [facility] with symptomatic anemia  8/13 EGD for investigation of tarry stools  8/15 SB capsule: stomach & SB lesions, likely ulcers.  8/17 EGD/push enteroscopy w/ angioectasias/erosions in small bowel. Gastropathy and esophagitis. Ulcer seen on VCE most likely scope trauma.    8/18 VSS transferred back to floor.   8/20 VS 9.0/28.4 stable Gastric biopsy results LA Grade A esophagitis.  - Acute gastritis. Biopsies taken to r/o CMV, No ulceration seen despite finding on capsule endoscopy - likely 2/2 scope  trauma that has since resolved. Pathology pending.  8/21 VSS H/H  8.1/25.7  trending down will monitor prednisone taper 30 mg today. Procardia 120 initiated today RHC biopsy Monday.   8/22 VVS; Patient reports loose stools x 2-3 days with 1 episode today.  Stool sent for C-dif and GI PCR. Abdominal X-ray revealed: dilated loops of bowel. Abdomen distended.  Patient denies N/V. GI called to re-evaluate. Continue with current medication regimen.  Awaiting for upcoming cardiac biopsy on Monday 8/24.  8/23   vss    creat up to 2.28 ,  repeating CMP,  TTE ordered  Bladder scan   8/24   cardiac bx cancelled   elev creat  to 2.74   cardio renal cslt called,    + CDIF   inc vanco to 500 q6   ID following  8/25 VSS: RHC today as per transplant team; transfuse 2 units PRBC today as per Dr. Viramontes; continue vanco for cdiff; transfer patient to CTU after cath today     Today:      ============================I/O===========================   I&O's Detail    26 Aug 2020 07:01  -  27 Aug 2020 07:00  --------------------------------------------------------  IN:    IV PiggyBack: 220 mL    Solution: 100 mL  Total IN: 320 mL    OUT:    Indwelling Catheter - Urethral: 1420 mL  Total OUT: 1420 mL    Total NET: -1100 mL      27 Aug 2020 07:01  -  27 Aug 2020 20:09  --------------------------------------------------------  IN:    IV PiggyBack: 100 mL    multiple electrolytes Injection Type 1: 150 mL    Oral Fluid: 30 mL    Solution: 150 mL  Total IN: 430 mL    OUT:    Indwelling Catheter - Urethral: 430 mL  Total OUT: 430 mL    Total NET: 0 mL        ============================ LABS =========================                        10.3   4.80  )-----------( 228      ( 27 Aug 2020 00:26 )             31.5     08-27    135  |  99  |  72<H>  ----------------------------<  68<L>  3.5   |  21<L>  |  2.86<H>    Ca    8.3<L>      27 Aug 2020 12:24  Phos  5.5     08-27  Mg     3.0     08-27    TPro  5.6<L>  /  Alb  3.2<L>  /  TBili  0.8  /  DBili  x   /  AST  31  /  ALT  22  /  AlkPhos  64  08-27    LIVER FUNCTIONS - ( 27 Aug 2020 00:26 )  Alb: 3.2 g/dL / Pro: 5.6 g/dL / ALK PHOS: 64 U/L / ALT: 22 U/L / AST: 31 U/L / GGT: x           PT/INR - ( 27 Aug 2020 00:26 )   PT: 12.5 sec;   INR: 1.05 ratio      PTT - ( 27 Aug 2020 00:26 )  PTT:28.0 sec  ABG - ( 27 Aug 2020 19:53 )  pH, Arterial: 7.42  pH, Blood: x     /  pCO2: 30    /  pO2: 118   / HCO3: 20    / Base Excess: -3.5  /  SaO2: 98        ======================Micro/Rad/Cardio=================  Culture: Reviewed   CXR: Reviewed  Echo: Reviewed  ======================================================  PAST MEDICAL & SURGICAL HISTORY:  H/O hemolytic anemia  H/O autoimmune hemolytic anemia  Knee pain, right  HLD (hyperlipidemia)  Former smoker  DVT of upper extremity (deep vein thrombosis)  Hepatitis C virus  GIB (gastrointestinal bleeding)  Ventricular fibrillation: s/p AICD  PAF (paroxysmal atrial fibrillation): on xarelto  Non-Ischemic Cardiomyopathy  SVT (Supraventricular Tachycardia)  HTN  CHF (Congestive Heart Failure)  S/P right heart catheterization: biopsy multiple  H/O heart transplant: 2/2018  Status post left hip replacement  History of Prior Ablation Treatment: for afib  AICD (Automatic Cardioverter/Defibrillator) Present: [employer] with 1 [employer] lead4/1/09- explanted and replaced with Medtronic 2 leads on 9/2/09    ====================ASSESSMENT ==============  Heart transplant 2/2018 for ACC/AHA stage D NICM   Acute respiratory failure, resolved  Supraventricular tachycardia  severe hypertension  Hyperlactatemia acidosis, resolved  Leukopenia  Acute blood loss anemia, stable  GI Bleed  CKD  Cdiff diarrhea  Klebsiella oxytoca bacteremia  Sepsis    Plan:  ====================== NEUROLOGY=====================  Continue to monitor neurological status as per ICU protocol.     ==================== RESPIRATORY======================  Respiratory status requiring nasal cannula, fiO2 28, incentive spirometry, and pulse oximetry.     ====================CARDIOVASCULAR====================  Heart transplant 2/2018 for ACC/AHA stage D NICM, c/w Prednisone taper; off cellcept while on high dose steroids   Pravastatin for transplant CAD prophylaxis   Continue hemodynamic monitoring.  Not on any pressors.    ===================HEMATOLOGIC/ONC ===================  Anemia s/p multiple blood transfusions  Heparin for DVT prophylaxis.    heparin   Injectable 5000 Unit(s) SubCutaneous every 8 hours    ===================== RENAL =========================  NORMAN on CKD, Anasarca   Monitor I/Os, BUN/Creatinine, electrolytes.    ==================== GASTROINTESTINAL===================  Continue Protonix for stress ulcer prophylaxis.  multiple electrolytes Injection Type 1 1000 milliLiter(s) (30 mL/Hr) IV Continuous <Continuous>  pantoprazole  Injectable 40 milliGRAM(s) IV Push two times a day    =======================    ENDOCRINE  =====================  Glycemic control with humalog sliding scale for stress hyperglycemia.     insulin lispro (HumaLOG) corrective regimen sliding scale   SubCutaneous three times a day before meals    ========================INFECTIOUS DISEASE================  Clostridium difficile PCR positive, continue with vancomycin and metronidazole   Continue transplant prophylaxis with Posaconazole and Atovaquone   Monitor team, WBC.    atovaquone Suspension 1500 milliGRAM(s) Oral daily  metroNIDAZOLE  IVPB      metroNIDAZOLE  IVPB 500 milliGRAM(s) IV Intermittent every 8 hours  posaconazole DR Tablet 300 milliGRAM(s) Oral daily  vancomycin    Solution 500 milliGRAM(s) Oral every 6 hours      Patient requires continuous monitoring with bedside rhythm monitoring, pulse oximetry monitoring and intermittent blood gas analysis. Care plan discussed with ICU care team. Patient remained critical and required more than usual post op care.     By signing my name below, IAngie, attest that this documentation has been prepared under the direction and in the presence of Li Ohara PA.  Electronically signed: Ab Garcia, 08-27-20 @ 20:09    ILev Vicki, personally performed the services described in this documentation. all medical record entries made by the ab were at my direction and in my presence. I have reviewed the chart and agree that the record reflects my personal performance and is accurate and complete  Electronically signed: Li Ohara PA. 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 Patient was treated w/ prednisone and Rituximab. Tacro was discontinued and patient was also transitioned to everolimus  Admitted  6/16-6/19  for hyperglycemia.  Reported to transplant team having one done black tarry stool-  instructed to  present to [facility] for hemolytic anemia. Patient has been following with Hematology outpatient.  Denies CP  N/V diarrhea SOB. (11 Aug 2020 20:08)    Surgery/Hospital course:  8/11 Admitted to [facility] with symptomatic anemia  8/13 EGD for investigation of tarry stools  8/15 SB capsule: stomach & SB lesions, likely ulcers.  8/17 EGD/push enteroscopy w/ angioectasias/erosions in small bowel. Gastropathy and esophagitis. Ulcer seen on VCE most likely scope trauma.    8/18 VSS transferred back to floor.   8/20 VS 9.0/28.4 stable Gastric biopsy results LA Grade A esophagitis.  - Acute gastritis. Biopsies taken to r/o CMV, No ulceration seen despite finding on capsule endoscopy - likely 2/2 scope  trauma that has since resolved. Pathology pending.  8/21 VSS H/H  8.1/25.7  trending down will monitor prednisone taper 30 mg today. Procardia 120 initiated today RHC biopsy Monday.   8/22 VVS; Patient reports loose stools x 2-3 days with 1 episode today.  Stool sent for C-dif and GI PCR. Abdominal X-ray revealed: dilated loops of bowel. Abdomen distended.  Patient denies N/V. GI called to re-evaluate. Continue with current medication regimen.  Awaiting for upcoming cardiac biopsy on Monday 8/24.  8/23   vss    creat up to 2.28 ,  repeating CMP,  TTE ordered  Bladder scan   8/24   cardiac bx cancelled   elev creat  to 2.74   cardio renal cslt called,    + CDIF   inc vanco to 500 q6   ID following  8/25 VSS: RHC today as per transplant team; transfuse 2 units PRBC today as per Dr. Viramontes; continue vanco for cdiff; transfer patient to CTU after cath today   8/26. Dieretic challenge with good response   8/27: Downgraded to the floor then upgraded to the CTU again for concerning of abd distention       ============================I/O===========================   I&O's Detail    26 Aug 2020 07:01  -  27 Aug 2020 07:00  --------------------------------------------------------  IN:    IV PiggyBack: 220 mL    Solution: 100 mL  Total IN: 320 mL    OUT:    Indwelling Catheter - Urethral: 1420 mL  Total OUT: 1420 mL    Total NET: -1100 mL      27 Aug 2020 07:01  -  27 Aug 2020 20:09  --------------------------------------------------------  IN:    IV PiggyBack: 100 mL    multiple electrolytes Injection Type 1: 150 mL    Oral Fluid: 30 mL    Solution: 150 mL  Total IN: 430 mL    OUT:    Indwelling Catheter - Urethral: 430 mL  Total OUT: 430 mL    Total NET: 0 mL        ============================ LABS =========================                        10.3   4.80  )-----------( 228      ( 27 Aug 2020 00:26 )             31.5     08-27    135  |  99  |  72<H>  ----------------------------<  68<L>  3.5   |  21<L>  |  2.86<H>    Ca    8.3<L>      27 Aug 2020 12:24  Phos  5.5     08-27  Mg     3.0     08-27    TPro  5.6<L>  /  Alb  3.2<L>  /  TBili  0.8  /  DBili  x   /  AST  31  /  ALT  22  /  AlkPhos  64  08-27    LIVER FUNCTIONS - ( 27 Aug 2020 00:26 )  Alb: 3.2 g/dL / Pro: 5.6 g/dL / ALK PHOS: 64 U/L / ALT: 22 U/L / AST: 31 U/L / GGT: x           PT/INR - ( 27 Aug 2020 00:26 )   PT: 12.5 sec;   INR: 1.05 ratio      PTT - ( 27 Aug 2020 00:26 )  PTT:28.0 sec  ABG - ( 27 Aug 2020 19:53 )  pH, Arterial: 7.42  pH, Blood: x     /  pCO2: 30    /  pO2: 118   / HCO3: 20    / Base Excess: -3.5  /  SaO2: 98        ======================Micro/Rad/Cardio=================  Culture: Reviewed   CXR: Reviewed  Echo: Reviewed  ======================================================  PAST MEDICAL & SURGICAL HISTORY:  H/O hemolytic anemia  H/O autoimmune hemolytic anemia  Knee pain, right  HLD (hyperlipidemia)  Former smoker  DVT of upper extremity (deep vein thrombosis)  Hepatitis C virus  GIB (gastrointestinal bleeding)  Ventricular fibrillation: s/p AICD  PAF (paroxysmal atrial fibrillation): on xarelto  Non-Ischemic Cardiomyopathy  SVT (Supraventricular Tachycardia)  HTN  CHF (Congestive Heart Failure)  S/P right heart catheterization: biopsy multiple  H/O heart transplant: 2/2018  Status post left hip replacement  History of Prior Ablation Treatment: for afib  AICD (Automatic Cardioverter/Defibrillator) Present: [employer] with 1 [employer] lead4/1/09- explanted and replaced with Medtronic 2 leads on 9/2/09    ====================ASSESSMENT ==============  Heart transplant 2/2018 for ACC/AHA stage D NICM   Acute respiratory failure, resolved  Supraventricular tachycardia  severe hypertension  Hyperlactatemia acidosis, resolved  Leukopenia  Acute blood loss anemia, stable  GI Bleed  CKD  Cdiff diarrhea  Klebsiella oxytoca bacteremia  Sepsis    Plan:  ====================== NEUROLOGY=====================  Continue to monitor neurological status as per ICU protocol.     ==================== RESPIRATORY======================  Respiratory status requiring nasal cannula, fiO2 28, incentive spirometry, and pulse oximetry.     ====================CARDIOVASCULAR====================  Heart transplant 2/2018 for ACC/AHA stage D NICM, on now on solumedrol from prednisone (NPO for abd distention) and everolimus. off cellcept while on high dose steroids   Continue hemodynamic monitoring.  Not on any pressors.    ===================HEMATOLOGIC/ONC ===================  Anemia s/p multiple blood transfusions  Heparin for DVT prophylaxis.    heparin   Injectable 5000 Unit(s) SubCutaneous every 8 hours    ===================== RENAL =========================  NORMAN on CKD, Anasarca. lasix prn   Monitor I/Os, BUN/Creatinine, electrolytes.     ==================== GASTROINTESTINAL===================  Continue Protonix for stress ulcer prophylaxis.  Monitor abd distention. on exam distended but soft, no peritonitis. Currently NPO   gen surgery following     multiple electrolytes Injection Type 1 1000 milliLiter(s) (50 mL/Hr) IV Continuous <Continuous>  pantoprazole  Injectable 40 milliGRAM(s) IV Push two times a day    =======================    ENDOCRINE  =====================  Glycemic control with humalog sliding scale for stress hyperglycemia.     insulin lispro (HumaLOG) corrective regimen sliding scale   SubCutaneous three times a day before meals    ========================INFECTIOUS DISEASE================  Clostridium difficile PCR positive, continue with vancomycin and metronidazole   Continue transplant prophylaxis with Posaconazole and Atovaquone   Monitor team, WBC.    atovaquone Suspension 1500 milliGRAM(s) Oral daily  metroNIDAZOLE  IVPB      metroNIDAZOLE  IVPB 500 milliGRAM(s) IV Intermittent every 8 hours  posaconazole DR Tablet 300 milliGRAM(s) Oral daily  vancomycin    Solution 500 milliGRAM(s) Oral every 6 hours      Patient requires continuous monitoring with bedside rhythm monitoring, pulse oximetry monitoring and intermittent blood gas analysis. Care plan discussed with ICU care team. Patient remained critical and required more than usual post op care.     By signing my name below, I, Angie Mg, attest that this documentation has been prepared under the direction and in the presence of Li Ohara PA.  Electronically signed: Katty Garcia, 08-27-20 @ 20:09    I, Li Ohara, personally performed the services described in this documentation. all medical record entries made by the ramuibkurt were at my direction and in my presence. I have reviewed the chart and agree that the record reflects my personal performance and is accurate and complete  Electronically signed: Li Ohara PA.

## 2020-08-27 NOTE — PROGRESS NOTE ADULT - PROBLEM SELECTOR PLAN 2
Urgent Abdominal X-ray   NPO   General Surgery following   Continue on Vanco 500 mg PO every 6 hours   Continue on Flagyl 500 mg IV every 8 hours Urgent Abdominal X-ray   NPO now   General Surgery following   ID following   Continue on Vanco 500 mg PO every 6 hours   Continue on Flagyl 500 mg IV every 8 hours Urgent Abdominal X-ray   NPO now Start on Plasma-lyte IVF at 30 cc/hr.    General Surgery following  ID following   Continue on Vanco 500 mg PO every 6 hours   Continue on Flagyl 500 mg IV every 8 hours

## 2020-08-27 NOTE — PROGRESS NOTE ADULT - ATTENDING COMMENTS
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 Course has been complicated by worsening heart failure, NORMAN, and new ileus secondary to c diff colitis. He had a NG tube placed this afternoon and is being transferred back to the CICU.  -appreciate excellent cardiology care  -taper prednisone to 20 mg daily on 8/28   -monitor hemolysis parameters (haptoglobin, LDH)- no evidence of hemolysis

## 2020-08-27 NOTE — PROGRESS NOTE ADULT - ASSESSMENT
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 Patient was treated w/ prednisone and Rituximab. Tacro was discontinued and patient was also transitioned to everolimus. Admitted again 6/16-6/19 for hyperglycemia. On 8/11 Reported to transplant team having one done black tarry stool-  instructed to present to [facility] for hemolytic anemia. Patient has been following with Hematology outpatient.  Denies CP  N/V diarrhea SOB. (11 Aug 2020 20:08)  Surgery/Hospital Course:  8/11 Admitted to [facility] with symptomatic anemia  8/13 EGD for investigation of tarry stools  8/14 CTU for SVT and respiratory distress   8/15 SB capsule: stomach & SB lesions, likely ulcers.  8/17 EGD/push enteroscopy w/ angioectasias/erosions in small bowel. Gastropathy and esophagitis. Ulcer seen on VCE most likely scope trauma.    8/18 VSS transferred back to floor.   8/19 VSS, H/H stable 8.8/24.5, would continue to hold asa per transplant team. Follow up CMV PCR and gastric biopsy.   8/20 VS 9.0/28.4 stable Gastric biopsy results LA Grade A esophagitis.  - Acute gastritis. Biopsies taken to r/o CMV, No ulceration seen despite finding on capsule endoscopy - likely 2/2 scope  trauma that has since resolved. Pathology pending.  8/21 VSS H/H  8.1/25.7  trending down will monitor prednisone taper 30 mg today. Procardia 120 initiated today [facility] biopsy Monday.   8/22 VVS; Patient reports loose stools x 2-3 days with 1 episode today.  Stool sent for C-dif and GI PCR. Abdominal X-ray revealed: dilated loops of bowel. Abdomen distended.  Patient denies N/V. GI called to re-evaluate. Continue with current medication regimen.  Awaiting for upcoming cardiac biopsy on Monday 8/24.  8/23 VVS; NORMAN creat up to 2.28, repeating CMP, TTE ordered Bladder scan.    8/24 Cardiac biopsy cancelled elevated creat to 2.74. cardio renal consult called,+ CDIF increased vanco to 500 q6 ID following. General Surgery following à no acute general surgical intervention at this time. Patient tolerating diet and passing flatus/stools without difficulty  8/25 VSS: RHC today as per transplant team; transfuse 1 unit PRBC today as per Dr. Viramontes; continue vanco for c-diff; Renal consult called for Baseline serum creatinine 1.0, now up to 3.1. Place a diaz catheter for I/O monitoring. NPO bowel rest as per general surgery transfer patient to CTU after cath today.   8/27 Patient transferred to floor per Dr. Parker.  Worsening abdominal distension.  Urgent Abd x-ray ordered.  CTU attending and NP shelton Hathaway at bedside.  Patient made NPO.  General surgery called to review X-ray and decide if NGT is warranted at this this time.  ABG sent.  Started on Plasma-lyte IVF at 30 cc/hr.  CHF following.

## 2020-08-27 NOTE — PROGRESS NOTE ADULT - PROBLEM SELECTOR PLAN 7
-Appr ID input  -Cdiff PCR positive  -C/w vanc PO and metronidazole  -Contact precautions  -Appr gen sx input  -Consider vanc enemas as per ID  -Would repeat CT A/P w/o contrast to r/a bowel distension -Appr ID input  -Cdiff PCR positive  -C/w vanc PO and metronidazole  -Contact precautions  -Appr gen sx input  -Consider vanc enemas as per ID  -abdom x-ray with ileus; NGT for decompression

## 2020-08-27 NOTE — PROGRESS NOTE ADULT - PROBLEM SELECTOR PLAN 1
Heart Transplant Team Following: f/u recs   -C/w everolimus 0.5 BID  -C/w Prednisone taper   -Off Cellcept while on high dose steroids. Will eventually need to start Cellcept as steroids weaned. Heart Transplant Team Following: f/u recs   -C/w everolimus 0.5 BID  -C/w Prednisone taper   - CHF following   -Off Cellcept while on high dose steroids. Will eventually need to start Cellcept as steroids weaned.

## 2020-08-27 NOTE — PROGRESS NOTE ADULT - ASSESSMENT
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 He has responded appropriately to a single blood transfusion. Of note, he recently has developed dark colored stools. Will investigate if anemia is due to GI bleeding in setting of steroid use or hemolysis and manage appropriately. Underwent UGI without bleeding source identified. Developed Klebsiella oxytoca bacteremia requiring transfer to CTU. Clinically improving, transferred to [facility], finished 10-day of ceftriaxone, however, developed severe C.diff colitis on Vancomycin 500mg q6h PO and IV Flagyl. He had RHC on 8/25 and found to have high filling pressure so transferred to CTU again.    #C.diff PCR detected (8/23): severe C.diff colitis  - Overnight 8/22 developed multiple episodes of watery diarrhea  - Abdominal distension noted on exam  - GI- PCR was negative for pathogens  - Abdominal CT(8/23): Rectal wall thickening. New since the previous exam. Correlate clinically for proctitis. Mild distention of the colon proximal to this. No small bowel obstruction.  - WBC=7.18, significant NORMAN (Cr: 1.03 > 2.74), albumin=3.6 (8/23)  - Due to significant NORMAN >50% Cr increase, meets criteria of severe C.diff infection. c/w PO Vancomycin to 500mg q6h (from 125mg q6h) + IV flagyl  - Trend daily CBC, CMP (for albumin), fever curve  - Surgery following, no acute OR    #Klebsiella oxytoca bacteremia:  Meropenem (8/14-8/17)  Cefepime (8/17-20)  Ceftriaxone (8/20-)  - Growing in blood cultures 2/2 sets on 8/13  - Unclear source  - Repeat blood cultures x2 sets 8/14 no growth final  - s/p ceftriaxone until Monday 8/24 to finish a 10-day course    #OI prophylaxis-  -has been receiving high dose steroids since 5/20  -CMV viral load(8/17): neg  -Valcyte and Bactrim d/c'ed on 8/17 due to leukopenia  -Galactomann<0.5, Fungitell<31  -c/w Atovaquone 1500mg daily for PCP ppx given on steroid   -On Posaconazole  -Per hemoc, will taper prednisone every 7 days by 10mg    #Pancytopenia- on admission  -Remains with significant leukopenia- especially lymphocyte lines  -COVID PCR neg  -Tick panel neg  -would repeat chest CT scan to see status of prior RUL scarring vs. nodule once patient is stable    #Anemia- r/o GI bleeding    EGD 8/17: esophagitis, acute gastritis s/p biopsy, no ulceration  Consider PPI or H2b    Lobo Mckoy MD, PGY4   ID fellow  Pager: [phone]  After 5pm/weekends call [phone]    d/w Dr. Lujan

## 2020-08-27 NOTE — PROGRESS NOTE ADULT - ATTENDING COMMENTS
Patient seen and examined   Case discussed with Dr. Granger and CT service    Abdomen remains quite distended with ileus seen on abdominal X-ray  NG tube placed    Agree with plans to transfer back to CTU for management of fluid status  General surgery to follow ileus and abdominal exam  NG tube to suction but will need to clamp tube for Vancomycin absorption  Also continue IV metronidazole  He is not a candidate for FMT based upon his immunocompromised state  Will investigate if other C.diff treatment options are available to him    Gary Lujan MD  [phone]  After 5pm/weekends [phone]

## 2020-08-27 NOTE — PROGRESS NOTE ADULT - PROBLEM SELECTOR PLAN 3
Renal following  Maintain diaz for I & O's and daily weights  daily bmp   Hold all nephrotoxin agents

## 2020-08-27 NOTE — PROGRESS NOTE ADULT - ASSESSMENT
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   -Currently no evidence of hemolysis although Jacky IgG positive, C3 negative   -presented with black stool   -Indirect bilirubin normal   -8/10 labs outpatient showing LDH elevated but stable, Tbili 0.7, retic mildly increased to 4.6%   -most likely anemia related to GI bleed, blood loss given labs and history   -GI consulted s/p endoscopy not revealing of bleed but per GI note: formal capsule report pending but possible gastric ulcer, duodenal angioectasisas/erosions, and 1 small bowel angioectasia seen, no active bleeding.   -Repeat endoscopy not revealing of ulcers or erosions seen on capsule but did have esophagitis, bx taken and sent for viral studies (CMV, etc) given immunocompromised status   -continue with IV protonix per GI   -continue to taper prednisone by 10mg every 7 days, tomorrow 8/28 should be tapered to 20mg   -recent haptoglobin 8/25 normal, not indicative of hemolysis   -continue folic acid supplementation    #Klebsiella Bacteremia   -completed abx on 8/24     #Cdiff  #Ileus  -Vanc PO and IV flagyl per ID   -abdomen distended, XR indicating Ileus now with NG tube  -plan per primary/surgery     Johnnie Whitten MD   Hematology/Oncology Fellow   [phone]  Please page on call fellow after 5pm and on weekends

## 2020-08-27 NOTE — PROGRESS NOTE ADULT - SUBJECTIVE AND OBJECTIVE BOX
INFECTIOUS DISEASES FOLLOW UP    This is a follow up note for this  69 yo Male with  s/p OHTx 2/18  severe C.diff  NORMAN    Interval event:  - Transferred back to [address] [place] [address], now at 2.65  - WBC=4.8      ROS:  CONSTITUTIONAL:  No fever, good appetite  CARDIOVASCULAR:  No chest pain or palpitations  RESPIRATORY:  No dyspnea  GASTROINTESTINAL:  c/o abdominal distension and diarrhea  GENITOURINARY:  No dysuria  NEUROLOGIC:  No headache,     Allergies  No Known Allergies    ANTIMICROBIALS:    atovaquone Suspension 1500 daily  metroNIDAZOLE  IVPB    metroNIDAZOLE  IVPB 500 every 8 hours  posaconazole DR Tablet 300 daily  vancomycin    Solution 500 every 6 hours      OTHER MEDS:  MEDICATIONS  (STANDING):  acetaminophen   Tablet .. 650 every 6 hours PRN  everolimus (ZORTRESS) 0.5 <User Schedule>  heparin   Injectable 5000 every 8 hours  insulin glargine Injectable (LANTUS) 10 at bedtime  insulin lispro (HumaLOG) corrective regimen sliding scale  three times a day before meals  insulin lispro Injectable (HumaLOG) 3 three times a day before meals  meperidine     Injectable 25 once  pantoprazole  Injectable 40 two times a day  pravastatin 20 at bedtime  predniSONE   Tablet 30 <User Schedule>      Vital Signs Last 24 Hrs  T(F): 98.3 (08-27-20 @ 14:24), Max: 99.1 (08-23-20 @ 19:50)    Vital Signs Last 24 Hrs  HR: 120 (08-27-20 @ 14:24) (117 - 131)  BP: 101/74 (08-27-20 @ 14:24) (96/65 - 101/74)  RR: 18 (08-27-20 @ 14:24)  SpO2: 98% (08-27-20 @ 14:24) (92% - 98%)  Wt(kg): --    PHYSICAL EXAM:  Constitutional: no acute distress, but lying in bed instead of the chair  Eyes: WALT, EOMI  Ear/Nose/Throat: no oral lesions, 	  Respiratory: clear BL  Cardiovascular: S1S2 sternotomy healed  Gastrointestinal: hyperactive BS, very distended abd, not overly tender on exam  Extremities:no e/e/c  No Lymphadenopathy  IV sites not inflammed.    Labs:                        10.3   4.80  )-----------( 228      ( 27 Aug 2020 00:26 )             31.5     08-27    135  |  99  |  72<H>  ----------------------------<  68<L>  3.5   |  21<L>  |  2.86<H>    Ca    8.3<L>      27 Aug 2020 12:24  Phos  5.5     08-27  Mg     3.0     08-27    TPro  5.6<L>  /  Alb  3.2<L>  /  TBili  0.8  /  DBili  x   /  AST  31  /  ALT  22  /  AlkPhos  64  08-27      WBC Trend:  WBC Count: 4.80 (08-27-20 @ 00:26)  WBC Count: 6.38 (08-26-20 @ 14:35)  WBC Count: 5.83 (08-26-20 @ 04:54)  WBC Count: 8.34 (08-25-20 @ 19:15)      Creatine Trend:  Creatinine, Serum: 2.86 (08-27)  Creatinine, Serum: 2.65 (08-27)  Creatinine, Serum: 2.96 (08-26)  Creatinine, Serum: 2.92 (08-26)      Liver Biochemical Testing Trend:  Alanine Aminotransferase (ALT/SGPT): 22 (08-27)  Alanine Aminotransferase (ALT/SGPT): 27 (08-26)  Alanine Aminotransferase (ALT/SGPT): 26 (08-26)  Aspartate Aminotransferase (AST/SGOT): 31 (08-27-20 @ 00:26)  Aspartate Aminotransferase (AST/SGOT): 34 (08-26-20 @ 14:35)  Aspartate Aminotransferase (AST/SGOT): 40 (08-26-20 @ 01:23)  Aspartate Aminotransferase (AST/SGOT): 40 (08-25-20 @ 19:15)  Aspartate Aminotransferase (AST/SGOT): 40 (08-25-20 @ 10:54)  Bilirubin Total, Serum: 0.8 (08-27)  Bilirubin Total, Serum: 0.9 (08-26)  Bilirubin Total, Serum: 0.8 (08-26)  Bilirubin Total, Serum: 0.8 (08-25)  Bilirubin Total, Serum: 0.6 (08-25)      Trend LDH  08-26-20 @ 01:23  550<H>  06-17-20 @ 08:51  376<H>  06-17-20 @ 08:50  390<H>  06-02-20 @ 07:26  323<H>  06-01-20 @ 07:16  266<H>  05-31-20 @ 07:53  246<H>  05-30-20 @ 07:39  247<H>  05-29-20 @ 07:42  223  05-28-20 @ 07:45  216  05-27-20 @ 07:15  237  05-26-20 @ 08:29  256<H>  05-23-20 @ 11:15  253<H>          MICROBIOLOGY:      Culture - Blood (collected 25 Aug 2020 23:14)  Source: .Blood Triple Lumen BROWN  Preliminary Report:    No growth to date.    Culture - Blood (collected 24 Aug 2020 11:07)  Source: .Blood Blood  Preliminary Report:    No growth to date.      C Diff by PCR Result: Detected (08-23 @ 01:54)    C. difficile GDH &amp; toxins A/B by EIA (08.22.20 @ 18:22)    Clostridium difficile GDH Toxins A&amp;B, EIA:   Indeterminate    Clostridium difficile GDH Interpretation: This specimen is positive for C. Difficile glutamate dehydrogenase (GDH)  antigen and negative for C. Difficile Toxins A & B, by EIA.  GDH is a  highly sensitive screening marker for C. Difficile that is produced in  large amounts by all C. Difficilestrains, both toxigenic and  nontoxigenic.  Specimens that are GDH positive and toxin negative will be  tested further by PCR to detect toxin gene sequences associated with  toxin producing C. Difficle.      Culture - Blood (collected 24 Aug 2020 11:07)  Source: .Blood Blood  Preliminary Report:    No growth to date.    GI PCR Panel, Stool (collected 23 Aug 2020 02:01)  Source: .Stool Feces  Final Report:    GI PCR Results: NOT detected    Culture - Urine (collected 14 Aug 2020 13:32)  Source: .Urine Clean Catch (Midstream)  Final Report:    No growth    Culture - Blood (collected 14 Aug 2020 01:32)  Source: .Blood Blood  Final Report:    No Growth Final    Culture - Blood (collected 14 Aug 2020 01:32)  Source: .Blood Blood  Final Report:    No Growth Final    Culture - Blood (collected 13 Aug 2020 14:48)  Source: .Blood Blood-Peripheral  Final Report:    Growth in aerobic and anaerobic bottles: Klebsiella oxytoca/Raoutella    ornithinolytica    See previous culture [id number]    Culture - Blood (collected 13 Aug 2020 12:14)  Source: .Blood Blood-Peripheral  Final Report:    Growth in anaerobic bottle: Klebsiella oxytoca/Raoutella ornithinolytica    "Due to technical problems, Proteus sp. will Not be reported as part of    the BCID panel until further notice"    ***Blood Panel PCR results on this specimen are available    approximately 3 hours after the Gram stain result.***    Gram stain, PCR, and/or culture results may not always    correspond due to difference in methodologies.    ************************************************************    This PCR assay was performed using[employer].    The following targets are tested for: Enterococcus,    vancomycin resistant enterococci, Listeria monocytogenes,    coagulase negative staphylococci, S. aureus,    methicillin resistant S. aureus, Streptococcus agalactiae    (Group B), S.pneumoniae, S. pyogenes (Group A),    Acinetobacter baumannii, Enterobacter cloacae, E. coli,    Klebsiella oxytoca, K. pneumoniae, Proteus sp.,    Serratia marcescens, Haemophilus influenzae,    Neisseria meningitidis, Pseudomonas aeruginosa, Candida    albicans, C. glabrata, C krusei, C parapsilosis,    C. tropicalis and the KPC resistance gene.  Organism: Blood Culture PCR  Klebsiella oxytoca /Raoutella ornithinolytica  Organism: Klebsiella oxytoca /Raoutella ornithinolytica    Sensitivities:      -  Amikacin: S <=16      -  Ampicillin: R >16 These ampicillin results predict results for amoxicillin      -  Ampicillin/Sulbactam: I 16/8 Enterobacter, Citrobacter, and Serratia may develop resistance during prolonged therapy (3-4 days)      -  Aztreonam: S <=4      -  Cefazolin: R >16 Enterobacter, Citrobacter, and Serratia may develop resistance during prolonged therapy (3-4 days)      -  Cefepime: S <=2      -  Cefoxitin: S <=8      -  Ceftriaxone: S <=1 Enterobacter, Citrobacter, and Serratia may develop resistance during prolonged therapy      -  Ciprofloxacin: S <=0.25      -  Ertapenem: S <=0.5      -  Gentamicin: S <=2      -  Imipenem: S <=1      -  Levofloxacin: S <=0.5      -  Meropenem: S <=1      -  Piperacillin/Tazobactam: S <=8      -  Tobramycin: S <=2      -  Trimethoprim/Sulfamethoxazole: R >2/38      Method Type: KAMILA  Organism: Blood Culture PCR    Sensitivities:      -  Klebsiella oxytoca: Detec      Method Type: PCR    Culture - Urine (collected 13 Aug 2020 00:40)  Source: .Urine Clean Catch (Midstream)  Final Report:    >=3 organisms. Probable collection contamination.    Culture - Blood (collected 17 Jun 2020 16:59)  Source: .Blood Blood-Peripheral  Final Report:    No Growth Final    Culture - Blood (collected 17 Jun 2020 14:19)  Source: .Blood Blood-Peripheral  Final Report:    No Growth Final      ABS CD4: 120 /uL (08-17-18 @ 13:00)    HIV-1 RNA Quantitative, Viral Load:   NOT DET. (05-31-18 @ 19:10)  HIV-1 Viral Load Result: NOT DET. (05-31-18 @ 19:10)  HIV-1 RNA Quantitative, Viral Load:   NOT DET. (02-02-18 @ 19:25)  HIV-1 Viral Load Result: NOT DET. (02-02-18 @ 19:25)        OTHER TESTS:  COVID-19 PCR: NotDetec (08-18-20 @ 13:14)  COVID-19 PCR: NotDetec (08-11-20 @ 20:08)        Blood Gas Arterial, Lactate: 0.7 (08-26 @ 04:55)  Blood Gas Arterial, Lactate: 0.7 (08-26 @ 01:16)  Blood Gas Arterial, Lactate: 1.0 (08-25 @ 18:39)  Blood Gas Venous - Lactate: 1.1 (08-25 @ 18:39)        RADIOLOGY & ADDITIONAL STUDIES:    < from: CT Abdomen and Pelvis w/ Oral Cont (08.23.20 @ 13:30) >  IMPRESSION:  Right lower lobe consolidation with air bronchograms unchanged since 8/14/2020. This could represent atelectasis and/or infection. Correlate clinically. Previously described patchy opacities in the left lower lobe and lingula have resolved. Trace right pleural effusion.Small right pleural effusion.    Rectal wall thickening. New since the previous exam. Correlate clinically for proctitis. Mild distention of the colon proximal to this. No small bowel obstruction.    < end of copied text >

## 2020-08-27 NOTE — PROGRESS NOTE ADULT - SUBJECTIVE AND OBJECTIVE BOX
VITAL SIGNS    Subjective: "I'm feeling ok." Report abdominal distension. Denies CP, palpitation, SOB, LOBATO, HA, dizziness, N/V/D, fever or chills.  No acute event noted overnight.     Telemetry:  -130     Vital Signs Last 24 Hrs  T(C): 36.4 (20 @ 10:10), Max: 37.2 (20 @ 16:00)  T(F): 97.6 (20 @ 10:10), Max: 98.9 (20 @ 16:00)  HR: 129 (20 @ 10:10) (117 - 131)  BP: 96/65 (20 @ 10:10) (96/65 - 96/65)  RR: 24 (20 @ 10:10) (12 - 39)  SpO2: 95% (20 @ 10:10) (92% - 97%)            @ 07:  -   @ 07:00  --------------------------------------------------------  IN: 320 mL / OUT: 1420 mL / NET: -1100 mL     @ 07:01  -   @ 12:39  --------------------------------------------------------  IN: 130 mL / OUT: 80 mL / NET: 50 mL    Daily     Daily Weight in k.1 (27 Aug 2020 00:00)    CAPILLARY BLOOD GLUCOSE    POCT Blood Glucose.: 79 mg/dL (27 Aug 2020 11:47)  POCT Blood Glucose.: 127 mg/dL (27 Aug 2020 07:45)  POCT Blood Glucose.: 129 mg/dL (26 Aug 2020 21:30)                      PHYSICAL EXAM    Neurology: alert and oriented x 3, nonfocal, no gross deficits    CV: (+) S1 and S2, No murmurs, rubs, gallops or clicks     Lungs: CTA B/L     Abdomen: soft, nontender, (+) distended, (+) tympanic; positive bowel sounds, (+) Flatus; (-) BM     : Indwelling diaz cath --> SD                Extremities:  B/L LE (+) 1 pitting edema; negative calf tenderness; (+) 2 DP palpable; Left IJ TLC with occlusive dressing C/D/I        acetaminophen Tablet .. 650 milliGRAM(s) Oral every 6 hours PRN  atovaquone Suspension 1500 milliGRAM(s) Oral daily  chlorhexidine 0.12% Liquid 5 milliLiter(s) Oral Mucosa every 4 hours  chlorhexidine 2% Cloths 1 Application(s) Topical <User Schedule>  cholecalciferol 1000 Unit(s) Oral daily  everolimus (ZORTRESS) 0.5 milliGRAM(s) Oral <User Schedule>  folic acid 1 milliGRAM(s) Oral daily  heparin   Injectable 5000 Unit(s) SubCutaneous every 8 hours  insulin glargine Injectable (LANTUS) 10 Unit(s) SubCutaneous at bedtime  insulin lispro (HumaLOG) corrective regimen sliding scale   SubCutaneous three times a day before meals  insulin lispro Injectable (HumaLOG) 3 Unit(s) SubCutaneous three times a day before meals  magnesium oxide 400 milliGRAM(s) Oral daily  meperidine  Injectable 25 milliGRAM(s) IV Push once  metroNIDAZOLE  IVPB      metroNIDAZOLE  IVPB 500 milliGRAM(s) IV Intermittent every 8 hours  multiple electrolytes Injection Type 1 1000 milliLiter(s) IV Continuous <Continuous>  pantoprazole  Injectable 40 milliGRAM(s) IV Push two times a day  posaconazole DR Tablet 300 milliGRAM(s) Oral daily  pravastatin 20 milliGRAM(s) Oral at bedtime  predniSONE Tablet 30 milliGRAM(s) Oral <User Schedule>  sodium chloride 0.9% lock flush 3 milliLiter(s) IV Push every 8 hours  vancomycin Solution 500 milliGRAM(s) Oral every 6 hours    Physical Therapy Rec:   Home  [  ]   Home w/ PT  [ X ]  Rehab  [  ]    Discussed with Cardiothoracic Team at AM rounds.

## 2020-08-27 NOTE — PROGRESS NOTE ADULT - ATTENDING COMMENTS
I have seen this patient with the fellow and agree with their assessment and plan. In addition,    # NORMAN/ ATN - Baseline serum creatinine 1.0. UA - no evidence of blood or WBC. Urine Na<35, suggestive of low effective arterial blood volume. Serum creatinine reached a plateau of 2.8-2.9.   No uremic symptoms. No acute indication for dialysis.    # Edema/volume overload - continue IV diuretics (goal I/O net negative 1 -1.5L).    # Anemia - S/p 2 units PRBC. Continue to monitor Hb level.     # Metabolic acidosis - secondary to NORMAN and diarrhea. Improved. Bicarb therapy is not indicated for now.     # Medication toxicity Monitoring: medication dose reviewed. Please dose medications to CrCl<15    # Hypokalemia - Serum potassium is 3.5. He likely had potassium loss through diarrhea and from furosemide. Replete K to keep K ~4.0    The rest of the recommendations as per fellow's note.    Umm Fang MD  Attending Nephrologist  [phone]

## 2020-08-27 NOTE — PROGRESS NOTE ADULT - SUBJECTIVE AND OBJECTIVE BOX
Patient seen and examined at bedside.    Overnight Events: NAEO. This AM, pt c/o continued abd pain and abd distension.    Review Of Systems: No chest pain, shortness of breath, or palpitations            Current Meds:  acetaminophen   Tablet .. 650 milliGRAM(s) Oral every 6 hours PRN  atovaquone Suspension 1500 milliGRAM(s) Oral daily  chlorhexidine 0.12% Liquid 5 milliLiter(s) Oral Mucosa every 4 hours  chlorhexidine 2% Cloths 1 Application(s) Topical <User Schedule>  cholecalciferol 1000 Unit(s) Oral daily  everolimus (ZORTRESS) 0.5 milliGRAM(s) Oral <User Schedule>  folic acid 1 milliGRAM(s) Oral daily  heparin   Injectable 5000 Unit(s) SubCutaneous every 8 hours  insulin glargine Injectable (LANTUS) 10 Unit(s) SubCutaneous at bedtime  insulin lispro (HumaLOG) corrective regimen sliding scale   SubCutaneous three times a day before meals  insulin lispro Injectable (HumaLOG) 3 Unit(s) SubCutaneous three times a day before meals  magnesium oxide 400 milliGRAM(s) Oral daily  meperidine     Injectable 25 milliGRAM(s) IV Push once  metroNIDAZOLE  IVPB      metroNIDAZOLE  IVPB 500 milliGRAM(s) IV Intermittent every 8 hours  pantoprazole  Injectable 40 milliGRAM(s) IV Push two times a day  posaconazole DR Tablet 300 milliGRAM(s) Oral daily  potassium chloride  10 mEq/50 mL IVPB 10 milliEquivalent(s) IV Intermittent every 1 hour  pravastatin 20 milliGRAM(s) Oral at bedtime  predniSONE   Tablet 30 milliGRAM(s) Oral <User Schedule>  sodium chloride 0.9% lock flush 3 milliLiter(s) IV Push every 8 hours  sodium chloride 0.9%. 1000 milliLiter(s) IV Continuous <Continuous>  vancomycin    Solution 500 milliGRAM(s) Oral every 6 hours      Vitals:  T(F): 97.6 (08-27), Max: 98.9 (08-26)  HR: 129 (08-27) (117 - 131)  BP: 96/65 (08-27) (96/65 - 96/65)  RR: 24 (08-27)  SpO2: 95% (08-27)  I&O's Summary    26 Aug 2020 07:01  -  27 Aug 2020 07:00  --------------------------------------------------------  IN: 320 mL / OUT: 1420 mL / NET: -1100 mL    27 Aug 2020 07:01  -  27 Aug 2020 10:38  --------------------------------------------------------  IN: 130 mL / OUT: 80 mL / NET: 50 mL        Physical Exam:  Gen: NAD.  HEENT: NCAT. PERRLA b/l.  Neck: No JVP elev.  CV: Normal S1, S2. RRR. No MRG.  Chest: CTAB. No WRR.  Abd: +BSx4. Soft. NTND.  Ext: No LE edema.  Skin: No cyanosis.                          10.3   4.80  )-----------( 228      ( 27 Aug 2020 00:26 )             31.5     08-27    137  |  102  |  66<H>  ----------------------------<  119<H>  3.5   |  18<L>  |  2.65<H>    Ca    8.4      27 Aug 2020 00:26  Phos  5.5     08-27  Mg     3.0     08-27    TPro  5.6<L>  /  Alb  3.2<L>  /  TBili  0.8  /  DBili  x   /  AST  31  /  ALT  22  /  AlkPhos  64  08-27    PT/INR - ( 27 Aug 2020 00:26 )   PT: 12.5 sec;   INR: 1.05 ratio         PTT - ( 27 Aug 2020 00:26 )  PTT:28.0 sec              New ECG(s): Personally reviewed    Echo:    Stress Testing:     Cath:    Imaging:    Interpretation of Telemetry: Patient seen and examined at bedside.    Overnight Events: NAEO. This AM, pt c/o continued abd pain and abd distension.    Review Of Systems: No chest pain, shortness of breath, or palpitations            Current Meds:  acetaminophen   Tablet .. 650 milliGRAM(s) Oral every 6 hours PRN  atovaquone Suspension 1500 milliGRAM(s) Oral daily  chlorhexidine 0.12% Liquid 5 milliLiter(s) Oral Mucosa every 4 hours  chlorhexidine 2% Cloths 1 Application(s) Topical <User Schedule>  cholecalciferol 1000 Unit(s) Oral daily  everolimus (ZORTRESS) 0.5 milliGRAM(s) Oral <User Schedule>  folic acid 1 milliGRAM(s) Oral daily  heparin   Injectable 5000 Unit(s) SubCutaneous every 8 hours  insulin glargine Injectable (LANTUS) 10 Unit(s) SubCutaneous at bedtime  insulin lispro (HumaLOG) corrective regimen sliding scale   SubCutaneous three times a day before meals  insulin lispro Injectable (HumaLOG) 3 Unit(s) SubCutaneous three times a day before meals  magnesium oxide 400 milliGRAM(s) Oral daily  meperidine     Injectable 25 milliGRAM(s) IV Push once  metroNIDAZOLE  IVPB      metroNIDAZOLE  IVPB 500 milliGRAM(s) IV Intermittent every 8 hours  pantoprazole  Injectable 40 milliGRAM(s) IV Push two times a day  posaconazole DR Tablet 300 milliGRAM(s) Oral daily  potassium chloride  10 mEq/50 mL IVPB 10 milliEquivalent(s) IV Intermittent every 1 hour  pravastatin 20 milliGRAM(s) Oral at bedtime  predniSONE   Tablet 30 milliGRAM(s) Oral <User Schedule>  sodium chloride 0.9% lock flush 3 milliLiter(s) IV Push every 8 hours  sodium chloride 0.9%. 1000 milliLiter(s) IV Continuous <Continuous>  vancomycin    Solution 500 milliGRAM(s) Oral every 6 hours      Vitals:  T(F): 97.6 (08-27), Max: 98.9 (08-26)  HR: 129 (08-27) (117 - 131)  BP: 96/65 (08-27) (96/65 - 96/65)  RR: 24 (08-27)  SpO2: 95% (08-27)  I&O's Summary    26 Aug 2020 07:01  -  27 Aug 2020 07:00  --------------------------------------------------------  IN: 320 mL / OUT: 1420 mL / NET: -1100 mL    27 Aug 2020 07:01  -  27 Aug 2020 10:38  --------------------------------------------------------  IN: 130 mL / OUT: 80 mL / NET: 50 mL        Physical Exam:  Gen: NAD.  HEENT: NCAT.  Neck: No JVP elev.  CV: Normal S1, S2. RRR. No MRG.  Chest: CTAB. No WRR.  Abd: +Abd distension, but compressible.  Ext: WWP.  Skin: No cyanosis.                          10.3   4.80  )-----------( 228      ( 27 Aug 2020 00:26 )             31.5     08-27    137  |  102  |  66<H>  ----------------------------<  119<H>  3.5   |  18<L>  |  2.65<H>    Ca    8.4      27 Aug 2020 00:26  Phos  5.5     08-27  Mg     3.0     08-27    TPro  5.6<L>  /  Alb  3.2<L>  /  TBili  0.8  /  DBili  x   /  AST  31  /  ALT  22  /  AlkPhos  64  08-27    PT/INR - ( 27 Aug 2020 00:26 )   PT: 12.5 sec;   INR: 1.05 ratio         PTT - ( 27 Aug 2020 00:26 )  PTT:28.0 sec

## 2020-08-27 NOTE — PROGRESS NOTE ADULT - SUBJECTIVE AND OBJECTIVE BOX
SURGERY DAILY PROGRESS NOTE:      S:   Patient seen and examined. Pain is well controlled. GI fxn +/+ (2 loose BM yesterday). Tolerating diet w/o N/V      O:   Exam:  Gen: NAD. A&Ox3.  Well developed, alert and cooperative.   Resp: No additional work of breathing.   Card: RR. No peripheral edema or pallor.   Abd: Softly distended. Unchanged from prior. Minimal tenderness.   Ext: WWP. Able to move all extremities equally.    Vital Signs Last 24 Hrs  T(C): 36.1 (27 Aug 2020 04:00), Max: 37.2 (26 Aug 2020 16:00)  T(F): 96.9 (27 Aug 2020 04:00), Max: 98.9 (26 Aug 2020 16:00)  HR: 131 (27 Aug 2020 06:00) (117 - 131)  BP: --  BP(mean): --  RR: 39 (27 Aug 2020 06:00) (12 - 39)  SpO2: 96% (27 Aug 2020 06:00) (92% - 99%)      08-25-20 @ 07:01  -  08-26-20 @ 07:00  --------------------------------------------------------  IN: 1070 mL / OUT: 1215 mL / NET: -145 mL    08-26-20 @ 07:01  -  08-27-20 @ 06:56  --------------------------------------------------------  IN: 320 mL / OUT: 1420 mL / NET: -1100 mL        LABS:                        10.3   4.80  )-----------( 228      ( 27 Aug 2020 00:26 )             31.5     08-27    137  |  102  |  66<H>  ----------------------------<  119<H>  3.5   |  18<L>  |  2.65<H>    Ca    8.4      27 Aug 2020 00:26  Phos  5.5     08-27  Mg     3.0     08-27    TPro  5.6<L>  /  Alb  3.2<L>  /  TBili  0.8  /  DBili  x   /  AST  31  /  ALT  22  /  AlkPhos  64  08-27    PT/INR - ( 27 Aug 2020 00:26 )   PT: 12.5 sec;   INR: 1.05 ratio         PTT - ( 27 Aug 2020 00:26 )  PTT:28.0 sec

## 2020-08-27 NOTE — PROGRESS NOTE ADULT - SUBJECTIVE AND OBJECTIVE BOX
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 Patient was treated w/ prednisone and Rituximab. Tacro was discontinued and patient was also transitioned to everolimus  Admitted  6/16-6/19  for hyperglycemia.  Reported to transplant team having one done black tarry stool-  instructed to  present to [facility] for hemolytic anemia. Patient has been following with Hematology outpatient.  Denies CP  N/V diarrhea SOB. (11 Aug 2020 20:08)      Hospital Course:   8/11 Admitted to [facility] with symptomatic anemia  8/13 EGD for investigation of tarry stools  8/15 SB capsule: stomach & SB lesions, likely ulcers.  8/17 EGD/push enteroscopy w/ angioectasias/erosions in small bowel. Gastropathy and esophagitis. Ulcer seen on VCE most likely scope trauma.    8/18 VSS transferred back to floor.   8/20 VS 9.0/28.4 stable Gastric biopsy results LA Grade A esophagitis.  - Acute gastritis. Biopsies taken to r/o CMV, No ulceration seen despite finding on capsule endoscopy - likely 2/2 scope  trauma that has since resolved. Pathology pending.  8/21 VSS H/H  8.1/25.7  trending down will monitor prednisone taper 30 mg today. Procardia 120 initiated today RHC biopsy Monday.   8/22 VVS; Patient reports loose stools x 2-3 days with 1 episode today.  Stool sent for C-dif and GI PCR. Abdominal X-ray revealed: dilated loops of bowel. Abdomen distended.  Patient denies N/V. GI called to re-evaluate. Continue with current medication regimen.  Awaiting for upcoming cardiac biopsy on Monday 8/24.  8/23   vss    creat up to 2.28 ,  repeating CMP,  TTE ordered  Bladder scan   8/24   cardiac bx cancelled   elev creat  to 2.74   cardio renal cslt called,    + CDIF   inc vanco to 500 q6   ID following  8/25 VSS: RHC today as per transplant team; transfuse 2 units PRBC today as per Dr. Viramontes; continue vanco for cdiff; transfer patient to CTU after cath today     VITAL SIGNS:  Vital Signs Last 24 Hrs  T(C): 36.1 (27 Aug 2020 04:00), Max: 37.2 (26 Aug 2020 16:00)  T(F): 96.9 (27 Aug 2020 04:00), Max: 98.9 (26 Aug 2020 16:00)  HR: 127 (27 Aug 2020 07:00) (117 - 131)  BP: --  BP(mean): --  RR: 27 (27 Aug 2020 07:00) (12 - 39)  SpO2: 95% (27 Aug 2020 07:00) (92% - 99%)    =========================I&Os=========================  I/Os:  I&O's Detail    26 Aug 2020 07:01  -  27 Aug 2020 07:00  --------------------------------------------------------  IN:    IV PiggyBack: 220 mL    Solution: 100 mL  Total IN: 320 mL    OUT:    Indwelling Catheter - Urethral: 1420 mL  Total OUT: 1420 mL    Total NET: -1100 mL          CAPILLARY BLOOD GLUCOSE      POCT Blood Glucose.: 129 mg/dL (26 Aug 2020 21:30)  POCT Blood Glucose.: 137 mg/dL (26 Aug 2020 11:35)  POCT Blood Glucose.: 78 mg/dL (26 Aug 2020 08:16)      ============================LABS=========================                        10.3   4.80  )-----------( 228      ( 27 Aug 2020 00:26 )             31.5     08-27    137  |  102  |  66<H>  ----------------------------<  119<H>  3.5   |  18<L>  |  2.65<H>    Ca    8.4      27 Aug 2020 00:26  Phos  5.5     08-27  Mg     3.0     08-27    TPro  5.6<L>  /  Alb  3.2<L>  /  TBili  0.8  /  DBili  x   /  AST  31  /  ALT  22  /  AlkPhos  64  08-27    LIVER FUNCTIONS - ( 27 Aug 2020 00:26 )  Alb: 3.2 g/dL / Pro: 5.6 g/dL / ALK PHOS: 64 U/L / ALT: 22 U/L / AST: 31 U/L / GGT: x           PT/INR - ( 27 Aug 2020 00:26 )   PT: 12.5 sec;   INR: 1.05 ratio         PTT - ( 27 Aug 2020 00:26 )  PTT:28.0 sec  ABG - ( 27 Aug 2020 00:20 )  pH, Arterial: 7.42  pH, Blood: x     /  pCO2: 32    /  pO2: 74    / HCO3: 20    / Base Excess: -3.0  /  SaO2: 94                    ===========================PMHX&PSHx==========================  PAST MEDICAL & SURGICAL HISTORY:  H/O hemolytic anemia  H/O autoimmune hemolytic anemia  Knee pain, right  HLD (hyperlipidemia)  Former smoker  DVT of upper extremity (deep vein thrombosis)  Hepatitis C virus  GIB (gastrointestinal bleeding)  Ventricular fibrillation: s/p AICD  PAF (paroxysmal atrial fibrillation): on xarelto  Non-Ischemic Cardiomyopathy  SVT (Supraventricular Tachycardia)  HTN  CHF (Congestive Heart Failure)  S/P right heart catheterization: biopsy multiple  H/O heart transplant: 2/2018  Status post left hip replacement  History of Prior Ablation Treatment: for afib  AICD (Automatic Cardioverter/Defibrillator) Present: [employer] with 1 [employer] lead4/1/09- explanted and replaced with [employer]tr[employer] 2 leads on 9/2/09    ============================IMAGING STUDIES=========================  Xray Chest 1 View- PORTABLE-Urgent (08.26.20 @ 08:08)   FINDINGS/  IMPRESSION:  Right basilar pleural effusion and atelectasis is again noted. The left lung is clear..  Left IJ central line within SVC.    CT Abdomen and Pelvis w/ Oral Cont (08.23.20 @ 13:30)   IMPRESSION:  Right lower lobe consolidation with air bronchograms unchanged since 8/14/2020. This could represent atelectasis and/or infection. Correlate clinically. Previously described patchy opacities in the left lower lobe and lingula have resolved. Trace right pleural effusion.Small right pleural effusion.  Rectal wall thickening. New since the previous exam. Correlate clinically for proctitis. Mild distention of the colon proximal to this. No small bowel obstruction.    ============================MEDICATIONS=========================  MEDICATIONS  (STANDING):  atovaquone Suspension 1500 milliGRAM(s) Oral daily  chlorhexidine 2% Cloths 1 Application(s) Topical <User Schedule>  cholecalciferol 1000 Unit(s) Oral daily  everolimus (ZORTRESS) 0.5 milliGRAM(s) Oral <User Schedule>  folic acid 1 milliGRAM(s) Oral daily  heparin   Injectable 5000 Unit(s) SubCutaneous every 8 hours  insulin glargine Injectable (LANTUS) 10 Unit(s) SubCutaneous at bedtime  insulin lispro (HumaLOG) corrective regimen sliding scale   SubCutaneous three times a day before meals  insulin lispro Injectable (HumaLOG) 3 Unit(s) SubCutaneous three times a day before meals  magnesium oxide 400 milliGRAM(s) Oral daily  metroNIDAZOLE  IVPB      metroNIDAZOLE  IVPB 500 milliGRAM(s) IV Intermittent every 8 hours  pantoprazole  Injectable 40 milliGRAM(s) IV Push two times a day  posaconazole DR Tablet 300 milliGRAM(s) Oral daily  pravastatin 20 milliGRAM(s) Oral at bedtime  predniSONE   Tablet 30 milliGRAM(s) Oral <User Schedule>  sodium chloride 0.9% lock flush 3 milliLiter(s) IV Push every 8 hours  vancomycin    Solution 500 milliGRAM(s) Oral every 6 hours    MEDICATIONS  (PRN):  acetaminophen   Tablet .. 650 milliGRAM(s) Oral every 6 hours PRN Mild Pain (1 - 3)      =============================ASSESSMENT===========================   Heart transplant 2/2018 for ACC/AHA stage D NICM   Supraventricular tachycardia  severe hypertension  Klebsiella oxytoca bactermia  Sepsis  acute respiratory failure, resolved  Hyperlactatemia acidosis, resolved  Leukopenia  Acute blood loss anemia, stable  GI Bleed  Cdiff diarrhea  CKD    =============================NEUROLOGY============================  Pain control with Tylenol prn   Continue to monitor neuro status     ==============================RESPIRATORY========================  Pt is on 2L nasal canula   Encourage incentive spirometry, continue pulse ox monitoring, follow ABGs     ============================CARDIOVASCULAR======================  Heart transplant 2/2018 for ACC/AHA stage D NICM, c/w Prednisone taper; off cellcept while on high dose steroids   Pravastatin for transplant CAD prophylaxis   Continue hemodynamic monitoring.  Not on any pressors    =====================RENAL===================  NORMAN on CKD, Anasarca   Monitor I/Os and electrolytes    ====================GASTROINTESTINAL===================  Tolerating full liquid diet   Continue GI prophylaxis with Protonix     =========================ENDOCRINE==========================  Glycemic control with humalog sliding scale and lantus for stress hyperglycemia     ========================HEMATOLOGIC/ONCOLOGIC====================  Anemia s/p multiple blood transfusions  Follow CBC in AM  VTE prophylaxis with Heparin 5000units [id number]     ============================INFECTIOUS DISEASE========================  Clostridium difficile PCR positive, continue with vancomycin and metronidazole   Continue transplant prophylaxis with Posaconazole and Atovaquone   Monitor team, WBC  ID on board    atovaquone Suspension 1500 milliGRAM(s) Oral daily  metroNIDAZOLE  IVPB      metroNIDAZOLE  IVPB 500 milliGRAM(s) IV Intermittent every 8 hours  posaconazole DR Tablet 300 milliGRAM(s) Oral daily  vancomycin    Solution 500 milliGRAM(s) Oral every 6 hours      Pt is on GI & DVT prophylaxis  OOB & ambulate     Pertinent clinical, laboratory, radiographic, hemodynamic, echocardiographic, respiratory data, microbiologic data and chart were reviewed and analyzed frequently throughout the course of the day and night  Patient seen, examined and plan discussed with CT Surgery / CTICU team during rounds.    I spent 30 minutes at bedside providing critical care from 7am to 5pm.    By signing my name below, I, Ronit Joe, attest that this documentation has been prepared under the direction and in the presence of Maverick Rondon DO  Electronically Signed: Katty Gaffney. 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 Patient was treated w/ prednisone and Rituximab. Tacro was discontinued and patient was also transitioned to everolimus  Admitted  6/16-6/19  for hyperglycemia.  Reported to transplant team having one done black tarry stool-  instructed to  present to [facility] for hemolytic anemia. Patient has been following with Hematology outpatient.  Denies CP  N/V diarrhea SOB. (11 Aug 2020 20:08)      Hospital Course:   8/11 Admitted to [facility] with symptomatic anemia  8/13 EGD for investigation of tarry stools  8/15 SB capsule: stomach & SB lesions, likely ulcers.  8/17 EGD/push enteroscopy w/ angioectasias/erosions in small bowel. Gastropathy and esophagitis. Ulcer seen on VCE most likely scope trauma.    8/18 VSS transferred back to floor.   8/20 VS 9.0/28.4 stable Gastric biopsy results LA Grade A esophagitis.  - Acute gastritis. Biopsies taken to r/o CMV, No ulceration seen despite finding on capsule endoscopy - likely 2/2 scope  trauma that has since resolved. Pathology pending.  8/21 VSS H/H  8.1/25.7  trending down will monitor prednisone taper 30 mg today. Procardia 120 initiated today RHC biopsy Monday.   8/22 VVS; Patient reports loose stools x 2-3 days with 1 episode today.  Stool sent for C-dif and GI PCR. Abdominal X-ray revealed: dilated loops of bowel. Abdomen distended.  Patient denies N/V. GI called to re-evaluate. Continue with current medication regimen.  Awaiting for upcoming cardiac biopsy on Monday 8/24.  8/23   vss    creat up to 2.28 ,  repeating CMP,  TTE ordered  Bladder scan   8/24   cardiac bx cancelled   elev creat  to 2.74   cardio renal cslt called,    + CDIF   inc vanco to 500 q6   ID following  8/25 VSS: RHC today as per transplant team; transfuse 2 units PRBC today as per Dr. Viramontes; continue vanco for cdiff; transfer patient to CTU after cath today     VITAL SIGNS:  Vital Signs Last 24 Hrs  T(C): 36.1 (27 Aug 2020 04:00), Max: 37.2 (26 Aug 2020 16:00)  T(F): 96.9 (27 Aug 2020 04:00), Max: 98.9 (26 Aug 2020 16:00)  HR: 127 (27 Aug 2020 07:00) (117 - 131)  BP: --  BP(mean): --  RR: 27 (27 Aug 2020 07:00) (12 - 39)  SpO2: 95% (27 Aug 2020 07:00) (92% - 99%)    =========================I&Os=========================  I/Os:  I&O's Detail    26 Aug 2020 07:01  -  27 Aug 2020 07:00  --------------------------------------------------------  IN:    IV PiggyBack: 220 mL    Solution: 100 mL  Total IN: 320 mL    OUT:    Indwelling Catheter - Urethral: 1420 mL  Total OUT: 1420 mL    Total NET: -1100 mL          CAPILLARY BLOOD GLUCOSE      POCT Blood Glucose.: 129 mg/dL (26 Aug 2020 21:30)  POCT Blood Glucose.: 137 mg/dL (26 Aug 2020 11:35)  POCT Blood Glucose.: 78 mg/dL (26 Aug 2020 08:16)      ============================LABS=========================                        10.3   4.80  )-----------( 228      ( 27 Aug 2020 00:26 )             31.5     08-27    137  |  102  |  66<H>  ----------------------------<  119<H>  3.5   |  18<L>  |  2.65<H>    Ca    8.4      27 Aug 2020 00:26  Phos  5.5     08-27  Mg     3.0     08-27    TPro  5.6<L>  /  Alb  3.2<L>  /  TBili  0.8  /  DBili  x   /  AST  31  /  ALT  22  /  AlkPhos  64  08-27    LIVER FUNCTIONS - ( 27 Aug 2020 00:26 )  Alb: 3.2 g/dL / Pro: 5.6 g/dL / ALK PHOS: 64 U/L / ALT: 22 U/L / AST: 31 U/L / GGT: x           PT/INR - ( 27 Aug 2020 00:26 )   PT: 12.5 sec;   INR: 1.05 ratio         PTT - ( 27 Aug 2020 00:26 )  PTT:28.0 sec  ABG - ( 27 Aug 2020 00:20 )  pH, Arterial: 7.42  pH, Blood: x     /  pCO2: 32    /  pO2: 74    / HCO3: 20    / Base Excess: -3.0  /  SaO2: 94                    ===========================PMHX&PSHx==========================  PAST MEDICAL & SURGICAL HISTORY:  H/O hemolytic anemia  H/O autoimmune hemolytic anemia  Knee pain, right  HLD (hyperlipidemia)  Former smoker  DVT of upper extremity (deep vein thrombosis)  Hepatitis C virus  GIB (gastrointestinal bleeding)  Ventricular fibrillation: s/p AICD  PAF (paroxysmal atrial fibrillation): on xarelto  Non-Ischemic Cardiomyopathy  SVT (Supraventricular Tachycardia)  HTN  CHF (Congestive Heart Failure)  S/P right heart catheterization: biopsy multiple  H/O heart transplant: 2/2018  Status post left hip replacement  History of Prior Ablation Treatment: for afib  AICD (Automatic Cardioverter/Defibrillator) Present: [employer] with 1 [employer] [place] lead4/1/09- explanted and replaced with Medtronic 2 leads on 9/2/09    ============================IMAGING STUDIES=========================  Xray Chest 1 View- PORTABLE-Urgent (08.26.20 @ 08:08)   FINDINGS/  IMPRESSION:  Right basilar pleural effusion and atelectasis is again noted. The left lung is clear..  Left IJ central line within SVC.    CT Abdomen and Pelvis w/ Oral Cont (08.23.20 @ 13:30)   IMPRESSION:  Right lower lobe consolidation with air bronchograms unchanged since 8/14/2020. This could represent atelectasis and/or infection. Correlate clinically. Previously described patchy opacities in the left lower lobe and lingula have resolved. Trace right pleural effusion.Small right pleural effusion.  Rectal wall thickening. New since the previous exam. Correlate clinically for proctitis. Mild distention of the colon proximal to this. No small bowel obstruction.    ============================MEDICATIONS=========================  MEDICATIONS  (STANDING):  atovaquone Suspension 1500 milliGRAM(s) Oral daily  chlorhexidine 2% Cloths 1 Application(s) Topical <User Schedule>  cholecalciferol 1000 Unit(s) Oral daily  everolimus (ZORTRESS) 0.5 milliGRAM(s) Oral <User Schedule>  folic acid 1 milliGRAM(s) Oral daily  heparin   Injectable 5000 Unit(s) SubCutaneous every 8 hours  insulin glargine Injectable (LANTUS) 10 Unit(s) SubCutaneous at bedtime  insulin lispro (HumaLOG) corrective regimen sliding scale   SubCutaneous three times a day before meals  insulin lispro Injectable (HumaLOG) 3 Unit(s) SubCutaneous three times a day before meals  magnesium oxide 400 milliGRAM(s) Oral daily  metroNIDAZOLE  IVPB      metroNIDAZOLE  IVPB 500 milliGRAM(s) IV Intermittent every 8 hours  pantoprazole  Injectable 40 milliGRAM(s) IV Push two times a day  posaconazole DR Tablet 300 milliGRAM(s) Oral daily  pravastatin 20 milliGRAM(s) Oral at bedtime  predniSONE   Tablet 30 milliGRAM(s) Oral <User Schedule>  sodium chloride 0.9% lock flush 3 milliLiter(s) IV Push every 8 hours  vancomycin    Solution 500 milliGRAM(s) Oral every 6 hours    MEDICATIONS  (PRN):  acetaminophen   Tablet .. 650 milliGRAM(s) Oral every 6 hours PRN Mild Pain (1 - 3)      =============================ASSESSMENT===========================   Heart transplant 2/2018 for ACC/AHA stage D NICM   Supraventricular tachycardia  severe hypertension  Klebsiella oxytoca bactermia  Sepsis  acute respiratory failure, resolved  Hyperlactatemia acidosis, resolved  Leukopenia  Acute blood loss anemia, stable  GI Bleed  Cdiff diarrhea  CKD    =============================NEUROLOGY============================  Pain control with Tylenol prn   Continue to monitor neuro status     ==============================RESPIRATORY========================  Pt is on 2L nasal canula   Encourage incentive spirometry, continue pulse ox monitoring, follow ABGs     ============================CARDIOVASCULAR======================  Heart transplant 2/2018 for ACC/AHA stage D NICM, c/w Prednisone taper; off cellcept while on high dose steroids   Pravastatin for transplant CAD prophylaxis   Continue hemodynamic monitoring.  Not on any pressors    =====================RENAL===================  NORMAN on CKD, Anasarca   Monitor I/Os and electrolytes    ====================GASTROINTESTINAL===================  NPO for now, abd distended, X ray   Continue GI prophylaxis with Protonix     =========================ENDOCRINE==========================  Glycemic control with humalog sliding scale and lantus for stress hyperglycemia     ========================HEMATOLOGIC/ONCOLOGIC====================  Anemia s/p multiple blood transfusions  Follow CBC in AM  VTE prophylaxis with Heparin 5000units [id number]     ============================INFECTIOUS DISEASE========================  Clostridium difficile PCR positive, continue with vancomycin and metronidazole   Continue transplant prophylaxis with Posaconazole and Atovaquone   Monitor team, WBC  ID on board    atovaquone Suspension 1500 milliGRAM(s) Oral daily  metroNIDAZOLE  IVPB      metroNIDAZOLE  IVPB 500 milliGRAM(s) IV Intermittent every 8 hours  posaconazole DR Tablet 300 milliGRAM(s) Oral daily  vancomycin    Solution 500 milliGRAM(s) Oral every 6 hours      Pt is on GI & DVT prophylaxis  OOB & ambulate     Pertinent clinical, laboratory, radiographic, hemodynamic, echocardiographic, respiratory data, microbiologic data and chart were reviewed and analyzed frequently throughout the course of the day and night  Patient seen, examined and plan discussed with CT Surgery / CTICU team during rounds.    I spent 30 minutes at bedside providing critical care from 7am to 5pm.    By signing my name below, I, Ronit Joe, attest that this documentation has been prepared under the direction and in the presence of Maverick Rondon DO  Electronically Signed: Katty Gaffney. 08-27-20 @ 07:34

## 2020-08-27 NOTE — PROGRESS NOTE ADULT - SUBJECTIVE AND OBJECTIVE BOX
INTERVAL HPI/OVERNIGHT EVENTS:  Patient S&E at bedside. Abdomen distended, has discomfort. Minimal bowel sounds.     VITAL SIGNS:  T(F): 98.3 (08-27-20 @ 14:24)  HR: 120 (08-27-20 @ 17:35)  BP: 105/72 (08-27-20 @ 17:35)  RR: 18 (08-27-20 @ 14:24)  SpO2: 98% (08-27-20 @ 14:24)  Wt(kg): --    PHYSICAL EXAM:    Constitutional: NAD  Eyes: EOMI, sclera non-icteric  Neck: supple  Respiratory: CTAB, no wheezes or crackles   Cardiovascular: RRR  Gastrointestinal: distended, no bowel sounds, NG tube with gastric fluid draining  Extremities: no cyanosis, clubbing or edema   Neurological: awake and alert      MEDICATIONS  (STANDING):  atovaquone Suspension 1500 milliGRAM(s) Oral daily  chlorhexidine 0.12% Liquid 15 milliLiter(s) Oral Mucosa every 12 hours  chlorhexidine 2% Cloths 1 Application(s) Topical <User Schedule>  cholecalciferol 1000 Unit(s) Oral daily  everolimus (ZORTRESS) 0.25 milliGRAM(s) Oral <User Schedule>  folic acid 1 milliGRAM(s) Oral daily  heparin   Injectable 5000 Unit(s) SubCutaneous every 8 hours  insulin glargine Injectable (LANTUS) 5 Unit(s) SubCutaneous at bedtime  insulin lispro (HumaLOG) corrective regimen sliding scale   SubCutaneous three times a day before meals  insulin lispro Injectable (HumaLOG) 3 Unit(s) SubCutaneous three times a day before meals  magnesium oxide 400 milliGRAM(s) Oral daily  meperidine     Injectable 25 milliGRAM(s) IV Push once  metroNIDAZOLE  IVPB      metroNIDAZOLE  IVPB 500 milliGRAM(s) IV Intermittent every 8 hours  multiple electrolytes Injection Type 1 1000 milliLiter(s) (30 mL/Hr) IV Continuous <Continuous>  pantoprazole  Injectable 40 milliGRAM(s) IV Push two times a day  posaconazole DR Tablet 300 milliGRAM(s) Oral daily  potassium chloride  10 mEq/50 mL IVPB 10 milliEquivalent(s) IV Intermittent every 2 hours  pravastatin 20 milliGRAM(s) Oral at bedtime  sodium chloride 0.9% lock flush 3 milliLiter(s) IV Push every 8 hours  vancomycin    Solution 500 milliGRAM(s) Oral every 6 hours    MEDICATIONS  (PRN):  acetaminophen   Tablet .. 650 milliGRAM(s) Oral every 6 hours PRN Mild Pain (1 - 3)      Allergies    No Known Allergies    Intolerances        LABS:                        10.3   4.80  )-----------( 228      ( 27 Aug 2020 00:26 )             31.5     08-27    135  |  99  |  72<H>  ----------------------------<  68<L>  3.5   |  21<L>  |  2.86<H>    Ca    8.3<L>      27 Aug 2020 12:24  Phos  5.5     08-27  Mg     3.0     08-27    TPro  5.6<L>  /  Alb  3.2<L>  /  TBili  0.8  /  DBili  x   /  AST  31  /  ALT  22  /  AlkPhos  64  08-27    PT/INR - ( 27 Aug 2020 00:26 )   PT: 12.5 sec;   INR: 1.05 ratio         PTT - ( 27 Aug 2020 00:26 )  PTT:28.0 sec      RADIOLOGY & ADDITIONAL TESTS:  Studies reviewed.

## 2020-08-27 NOTE — PROGRESS NOTE ADULT - ASSESSMENT
A/P: 70y M with history of heart transplant in 2/2018 admitted with autoimmune hemolytic anemia and dark stools requiring transfusion. General Surgery consulted for concern for compartment syndrome. Now in CTU for acute kidney injury, on HD.     Plan:  - no acute general surgical intervention at this time, abd is soft, no change in distention and minimally-tender. Low suspicion for compartment syndrome at this time, but management discussed with CTU team.   - Patient tolerating diet and passing flatus/stools without difficulty  - thickening on CT of rectal mucosa likely 2/2 c. diff colitis     [place] Team Surgery  [phone]

## 2020-08-28 LAB
ALBUMIN SERPL ELPH-MCNC: 2.9 G/DL — LOW (ref 3.3–5)
ALP SERPL-CCNC: 60 U/L — SIGNIFICANT CHANGE UP (ref 40–120)
ALT FLD-CCNC: 20 U/L — SIGNIFICANT CHANGE UP (ref 10–45)
ANION GAP SERPL CALC-SCNC: 13 MMOL/L — SIGNIFICANT CHANGE UP (ref 5–17)
APTT BLD: 35.9 SEC — HIGH (ref 27.5–35.5)
AST SERPL-CCNC: 29 U/L — SIGNIFICANT CHANGE UP (ref 10–40)
BASE EXCESS BLDV CALC-SCNC: -3.1 MMOL/L — LOW (ref -2–2)
BILIRUB SERPL-MCNC: 0.7 MG/DL — SIGNIFICANT CHANGE UP (ref 0.2–1.2)
BUN SERPL-MCNC: 73 MG/DL — HIGH (ref 7–23)
CA-I SERPL-SCNC: 1.12 MMOL/L — SIGNIFICANT CHANGE UP (ref 1.12–1.3)
CALCIUM SERPL-MCNC: 8.4 MG/DL — SIGNIFICANT CHANGE UP (ref 8.4–10.5)
CHLORIDE BLDV-SCNC: 104 MMOL/L — SIGNIFICANT CHANGE UP (ref 96–108)
CHLORIDE SERPL-SCNC: 101 MMOL/L — SIGNIFICANT CHANGE UP (ref 96–108)
CMV DNA CSF QL NAA+PROBE: SIGNIFICANT CHANGE UP
CO2 BLDV-SCNC: 24 MMOL/L — SIGNIFICANT CHANGE UP (ref 22–30)
CO2 SERPL-SCNC: 21 MMOL/L — LOW (ref 22–31)
CREAT SERPL-MCNC: 2.86 MG/DL — HIGH (ref 0.5–1.3)
CYCLOSPORINE SER-MCNC: 353 NG/ML — SIGNIFICANT CHANGE UP (ref 150–400)
EVEROLIMUS, WHOLE BLOOD RESULT: 10.1 NG/ML — HIGH (ref 3–?)
EVEROLIMUS, WHOLE BLOOD RESULT: 12.9 NG/ML — HIGH (ref 3–?)
GAS PNL BLDV: 133 MMOL/L — LOW (ref 135–145)
GAS PNL BLDV: SIGNIFICANT CHANGE UP
GAS PNL BLDV: SIGNIFICANT CHANGE UP
GLUCOSE BLDC GLUCOMTR-MCNC: 100 MG/DL — HIGH (ref 70–99)
GLUCOSE BLDC GLUCOMTR-MCNC: 100 MG/DL — HIGH (ref 70–99)
GLUCOSE BLDC GLUCOMTR-MCNC: 102 MG/DL — HIGH (ref 70–99)
GLUCOSE BLDV-MCNC: 85 MG/DL — SIGNIFICANT CHANGE UP (ref 70–99)
GLUCOSE SERPL-MCNC: 86 MG/DL — SIGNIFICANT CHANGE UP (ref 70–99)
HCO3 BLDV-SCNC: 22 MMOL/L — SIGNIFICANT CHANGE UP (ref 21–29)
HCT VFR BLD CALC: 33 % — LOW (ref 39–50)
HCT VFR BLDA CALC: 35 % — LOW (ref 39–50)
HGB BLD CALC-MCNC: 11.2 G/DL — LOW (ref 13–17)
HGB BLD-MCNC: 10.6 G/DL — LOW (ref 13–17)
HOROWITZ INDEX BLDV+IHG-RTO: 28 — SIGNIFICANT CHANGE UP
LACTATE BLDV-MCNC: 0.8 MMOL/L — SIGNIFICANT CHANGE UP (ref 0.7–2)
MAGNESIUM SERPL-MCNC: 3.3 MG/DL — HIGH (ref 1.6–2.6)
MCHC RBC-ENTMCNC: 29.5 PG — SIGNIFICANT CHANGE UP (ref 27–34)
MCHC RBC-ENTMCNC: 32.1 GM/DL — SIGNIFICANT CHANGE UP (ref 32–36)
MCV RBC AUTO: 91.9 FL — SIGNIFICANT CHANGE UP (ref 80–100)
NRBC # BLD: 0 /100 WBCS — SIGNIFICANT CHANGE UP (ref 0–0)
OTHER CELLS CSF MANUAL: 10 ML/DL — LOW (ref 18–23)
PCO2 BLDV: 44 MMHG — SIGNIFICANT CHANGE UP (ref 35–50)
PH BLDV: 7.32 — LOW (ref 7.35–7.45)
PHOSPHATE SERPL-MCNC: 5.3 MG/DL — HIGH (ref 2.5–4.5)
PLATELET # BLD AUTO: 203 K/UL — SIGNIFICANT CHANGE UP (ref 150–400)
PO2 BLDV: 40 MMHG — SIGNIFICANT CHANGE UP (ref 25–45)
POTASSIUM BLDV-SCNC: 3.4 MMOL/L — LOW (ref 3.5–5.3)
POTASSIUM SERPL-MCNC: 3.5 MMOL/L — SIGNIFICANT CHANGE UP (ref 3.5–5.3)
POTASSIUM SERPL-SCNC: 3.5 MMOL/L — SIGNIFICANT CHANGE UP (ref 3.5–5.3)
PROT SERPL-MCNC: 5.4 G/DL — LOW (ref 6–8.3)
RBC # BLD: 3.59 M/UL — LOW (ref 4.2–5.8)
RBC # FLD: 19.3 % — HIGH (ref 10.3–14.5)
SAO2 % BLDV: 67 % — SIGNIFICANT CHANGE UP (ref 67–88)
SODIUM SERPL-SCNC: 135 MMOL/L — SIGNIFICANT CHANGE UP (ref 135–145)
WBC # BLD: 4.42 K/UL — SIGNIFICANT CHANGE UP (ref 3.8–10.5)
WBC # FLD AUTO: 4.42 K/UL — SIGNIFICANT CHANGE UP (ref 3.8–10.5)

## 2020-08-28 PROCEDURE — 99232 SBSQ HOSP IP/OBS MODERATE 35: CPT

## 2020-08-28 PROCEDURE — 71250 CT THORAX DX C-: CPT | Mod: 26

## 2020-08-28 PROCEDURE — 74176 CT ABD & PELVIS W/O CONTRAST: CPT | Mod: 26

## 2020-08-28 PROCEDURE — 99291 CRITICAL CARE FIRST HOUR: CPT

## 2020-08-28 PROCEDURE — 99233 SBSQ HOSP IP/OBS HIGH 50: CPT | Mod: GC

## 2020-08-28 PROCEDURE — 74018 RADEX ABDOMEN 1 VIEW: CPT | Mod: 26

## 2020-08-28 RX ORDER — ALBUMIN HUMAN 25 %
250 VIAL (ML) INTRAVENOUS
Refills: 0 | Status: COMPLETED | OUTPATIENT
Start: 2020-08-28 | End: 2020-08-28

## 2020-08-28 RX ORDER — CYCLOSPORINE 100 MG/1
150 CAPSULE ORAL DAILY
Refills: 0 | Status: DISCONTINUED | OUTPATIENT
Start: 2020-08-28 | End: 2020-08-29

## 2020-08-28 RX ADMIN — Medication 125 MILLILITER(S): at 07:46

## 2020-08-28 RX ADMIN — EVEROLIMUS 0.25 MILLIGRAM(S): 10 TABLET ORAL at 07:24

## 2020-08-28 RX ADMIN — SODIUM CHLORIDE 3 MILLILITER(S): 9 INJECTION INTRAMUSCULAR; INTRAVENOUS; SUBCUTANEOUS at 21:13

## 2020-08-28 RX ADMIN — HEPARIN SODIUM 5000 UNIT(S): 5000 INJECTION INTRAVENOUS; SUBCUTANEOUS at 06:45

## 2020-08-28 RX ADMIN — Medication 100 MILLIGRAM(S): at 01:29

## 2020-08-28 RX ADMIN — Medication 16 MILLIGRAM(S): at 07:36

## 2020-08-28 RX ADMIN — Medication 500 MILLIGRAM(S): at 07:30

## 2020-08-28 RX ADMIN — Medication 100 MILLIGRAM(S): at 08:27

## 2020-08-28 RX ADMIN — CYCLOSPORINE 4.29 MILLIGRAM(S): 100 CAPSULE ORAL at 10:30

## 2020-08-28 RX ADMIN — CHLORHEXIDINE GLUCONATE 1 APPLICATION(S): 213 SOLUTION TOPICAL at 06:33

## 2020-08-28 RX ADMIN — Medication 500 MILLIGRAM(S): at 14:09

## 2020-08-28 RX ADMIN — HEPARIN SODIUM 5000 UNIT(S): 5000 INJECTION INTRAVENOUS; SUBCUTANEOUS at 22:11

## 2020-08-28 RX ADMIN — PANTOPRAZOLE SODIUM 40 MILLIGRAM(S): 20 TABLET, DELAYED RELEASE ORAL at 06:45

## 2020-08-28 RX ADMIN — SODIUM CHLORIDE 3 MILLILITER(S): 9 INJECTION INTRAMUSCULAR; INTRAVENOUS; SUBCUTANEOUS at 13:56

## 2020-08-28 RX ADMIN — PANTOPRAZOLE SODIUM 40 MILLIGRAM(S): 20 TABLET, DELAYED RELEASE ORAL at 17:59

## 2020-08-28 RX ADMIN — Medication 125 MILLILITER(S): at 07:18

## 2020-08-28 RX ADMIN — HEPARIN SODIUM 5000 UNIT(S): 5000 INJECTION INTRAVENOUS; SUBCUTANEOUS at 14:08

## 2020-08-28 RX ADMIN — Medication 500 MILLIGRAM(S): at 20:06

## 2020-08-28 RX ADMIN — SODIUM CHLORIDE 3 MILLILITER(S): 9 INJECTION INTRAMUSCULAR; INTRAVENOUS; SUBCUTANEOUS at 06:34

## 2020-08-28 RX ADMIN — Medication 500 MILLIGRAM(S): at 01:30

## 2020-08-28 RX ADMIN — Medication 100 MILLIGRAM(S): at 18:04

## 2020-08-28 NOTE — PROGRESS NOTE ADULT - PROBLEM SELECTOR PLAN 6
-Appr ID input  -Cdiff PCR positive  -C/w vanc PO and metronidazole  -Contact precautions  -Appr gen sx input  -NGT in place; will defer removal to GI/gen sx/ID  -Please repeat CT A/P to r/a for toxic megacolon

## 2020-08-28 NOTE — PROGRESS NOTE ADULT - SUBJECTIVE AND OBJECTIVE BOX
INFECTIOUS DISEASES FOLLOW UP    This is a follow up note for this  69 yo Male with  s/p OHTx 2/18  severe C.diff  [place]    Interval event:  - Transferred back to 3CTU for persistent abd distension, NGT placed  - 2 BM yesterday, 2 BM overnight, 2 BM this morning  - Cr=2.86  - Albumin decreased to 2.9  - WBC=4.42  - CMV PCR (8/26): not detect  - Posaconazole/Atovaquone order was d/c'ed???  - Primary team switched everolimus to cyclosporine    ROS:  CONSTITUTIONAL:  No fever, good appetite  CARDIOVASCULAR:  No chest pain or palpitations  RESPIRATORY:  No dyspnea  GASTROINTESTINAL:  c/o abdominal distension and diarrhea  GENITOURINARY:  No dysuria  NEUROLOGIC:  No headache,     Allergies  No Known Allergies    ANTIMICROBIALS:    metroNIDAZOLE  IVPB    metroNIDAZOLE  IVPB 500 every 8 hours  vancomycin    Solution 500 every 6 hours      OTHER MEDS:  MEDICATIONS  (STANDING):  cycloSPORINE  (SandIMMUNE) IVPB 150 daily  heparin   Injectable 5000 every 8 hours  insulin lispro (HumaLOG) corrective regimen sliding scale  three times a day before meals  methylPREDNISolone sodium succinate Injectable 16 <User Schedule>  pantoprazole  Injectable 40 two times a day    Vital Signs Last 24 Hrs  T(F): 98.7 (08-28-20 @ 11:00), Max: 99.1 (08-23-20 @ 19:50)    Vital Signs Last 24 Hrs  HR: 129 (08-28-20 @ 12:00) (113 - 129)  BP: 111/71 (08-28-20 @ 12:00) (94/71 - 119/74)  RR: 16 (08-27-20 @ 20:00)  SpO2: 96% (08-28-20 @ 12:00) (96% - 100%)  Wt(kg): --      PHYSICAL EXAM:  Constitutional: no acute distress, but lying in bed instead of the chair  Eyes: WALT, EOMI  Ear/Nose/Throat: no oral lesions, 	  Respiratory: clear BL  Cardiovascular: S1S2 sternotomy healed  Gastrointestinal: hyperactive BS, very distended abd, not overly tender on exam  Extremities:no e/e/c  No Lymphadenopathy  IV sites not inflammed.    Labs:                        10.6   4.42  )-----------( 203      ( 28 Aug 2020 01:17 )             33.0     08-28    135  |  101  |  73<H>  ----------------------------<  86  3.5   |  21<L>  |  2.86<H>    Ca    8.4      28 Aug 2020 01:18  Phos  5.3     08-28  Mg     3.3     08-28    TPro  5.4<L>  /  Alb  2.9<L>  /  TBili  0.7  /  DBili  x   /  AST  29  /  ALT  20  /  AlkPhos  60  08-28      WBC Trend:  WBC Count: 4.42 (08-28-20 @ 01:17)  WBC Count: 4.80 (08-27-20 @ 00:26)  WBC Count: 6.38 (08-26-20 @ 14:35)  WBC Count: 5.83 (08-26-20 @ 04:54)        Creatine Trend:  Creatinine, Serum: 2.86 (08-28)  Creatinine, Serum: 2.86 (08-27)  Creatinine, Serum: 2.65 (08-27)  Creatinine, Serum: 2.96 (08-26)      Liver Biochemical Testing Trend:  Alanine Aminotransferase (ALT/SGPT): 20 (08-28)  Alanine Aminotransferase (ALT/SGPT): 22 (08-27)  Alanine Aminotransferase (ALT/SGPT): 27 (08-26)  Aspartate Aminotransferase (AST/SGOT): 29 (08-28-20 @ 01:18)  Aspartate Aminotransferase (AST/SGOT): 31 (08-27-20 @ 00:26)  Aspartate Aminotransferase (AST/SGOT): 34 (08-26-20 @ 14:35)  Aspartate Aminotransferase (AST/SGOT): 40 (08-26-20 @ 01:23)  Aspartate Aminotransferase (AST/SGOT): 40 (08-25-20 @ 19:15)  Bilirubin Total, Serum: 0.7 (08-28)  Bilirubin Total, Serum: 0.8 (08-27)  Bilirubin Total, Serum: 0.9 (08-26)  Bilirubin Total, Serum: 0.8 (08-26)  Bilirubin Total, Serum: 0.8 (08-25)      Trend LDH  08-26-20 @ 01:23  550<H>  06-17-20 @ 08:51  376<H>  06-17-20 @ 08:50  390<H>  06-02-20 @ 07:26  323<H>  06-01-20 @ 07:16  266<H>  05-31-20 @ 07:53  246<H>  05-30-20 @ 07:39  247<H>  05-29-20 @ 07:42  223  05-28-20 @ 07:45  216  05-27-20 @ 07:15  237  05-26-20 @ 08:29  256<H>  05-23-20 @ 11:15  253<H>          MICROBIOLOGY:      Culture - Blood (collected 25 Aug 2020 23:14)  Source: .Blood Triple Lumen BROWN  Preliminary Report:    No growth to date.    Culture - Blood (collected 24 Aug 2020 11:07)  Source: .Blood Blood  Preliminary Report:    No growth to date.    GI PCR Panel, Stool (collected 23 Aug 2020 02:01)  Source: .Stool Feces  Final Report:    GI PCR Results: NOT detected    Culture - Urine (collected 14 Aug 2020 13:32)  Source: .Urine Clean Catch (Midstream)  Final Report:    No growth    Culture - Blood (collected 14 Aug 2020 01:32)  Source: .Blood Blood  Final Report:    No Growth Final    Culture - Blood (collected 14 Aug 2020 01:32)  Source: .Blood Blood  Final Report:    No Growth Final    Culture - Blood (collected 13 Aug 2020 14:48)  Source: .Blood Blood-Peripheral  Final Report:    Growth in aerobic and anaerobic bottles: Klebsiella oxytoca/Raoutella    ornithinolytica    See previous culture [id number]    Culture - Blood (collected 13 Aug 2020 12:14)  Source: .Blood Blood-Peripheral  Final Report:    Growth in anaerobic bottle: Klebsiella oxytoca/Raoutella ornithinolytica    "Due to technical problems, Proteus sp. will Not be reported as part of    the BCID panel until further notice"    ***Blood Panel PCR results on this specimen are available    approximately 3 hours after the Gram stain result.***    Gram stain, PCR, and/or culture results may not always    correspond due to difference in methodologies.    ************************************************************    This PCR assay was performed using[employer]: [employer].    The following targets are tested for: Enterococcus,    vancomycin resistant enterococci, Listeria monocytogenes,    coagulase negative staphylococci, S. aureus,    methicillin resistant S. aureus, Streptococcus agalactiae    (Group B), S.pneumoniae, S. pyogenes (Group A),    Acinetobacter baumannii, Enterobacter cloacae, E. coli,    Klebsiella oxytoca, K. pneumoniae, Proteus sp.,    Serratia marcescens, Haemophilus influenzae,    Neisseria meningitidis, Pseudomonas aeruginosa, Candida    albicans, C. glabrata, C krusei, C parapsilosis,    C. tropicalis and the KPC resistance gene.  Organism: Blood Culture PCR  Klebsiella oxytoca /Raoutella ornithinolytica  Organism: Klebsiella oxytoca /Raoutella ornithinolytica    Sensitivities:      -  Amikacin: S <=16      -  Ampicillin: R >16 These ampicillin results predict results for amoxicillin      -  Ampicillin/Sulbactam: I 16/8 Enterobacter, Citrobacter, and Serratia may develop resistance during prolonged therapy (3-4 days)      -  Aztreonam: S <=4      -  Cefazolin: R >16 Enterobacter, Citrobacter, and Serratia may develop resistance during prolonged therapy (3-4 days)      -  Cefepime: S <=2      -  Cefoxitin: S <=8      -  Ceftriaxone: S <=1 Enterobacter, Citrobacter, and Serratia may develop resistance during prolonged therapy      -  Ciprofloxacin: S <=0.25      -  Ertapenem: S <=0.5      -  Gentamicin: S <=2      -  Imipenem: S <=1      -  Levofloxacin: S <=0.5      -  Meropenem: S <=1      -  Piperacillin/Tazobactam: S <=8      -  Tobramycin: S <=2      -  Trimethoprim/Sulfamethoxazole: R >2/38      Method Type: KAMILA  Organism: Blood Culture PCR    Sensitivities:      -  Klebsiella oxytoca: Detec      Method Type: PCR    Culture - Urine (collected 13 Aug 2020 00:40)  Source: .Urine Clean Catch (Midstream)  Final Report:    >=3 organisms. Probable collection contamination.    Culture - Blood (collected 17 Jun 2020 16:59)  Source: .Blood Blood-Peripheral  Final Report:    No Growth Final      HIV-1/2 Combo Result: Nonreact (05-02-20 @ 11:28)    ABS CD4: 120 /uL (08-17-18 @ 13:00)    HIV-1 RNA Quantitative, Viral Load:   NOT DET. (05-31-18 @ 19:10)  HIV-1 Viral Load Result: NOT DET. (05-31-18 @ 19:10)  HIV-1 RNA Quantitative, Viral Load:   NOT DET. (02-02-18 @ 19:25)  HIV-1 Viral Load Result: NOT DET. (02-02-18 @ 19:25)    Cytomegalovirus By PCR:   NotDetec      C Diff by PCR Result: Detected (08-23 @ 01:54)    Everolimus, Whole Blood (08.26.20 @ 13:51)    Everolimus, Whole Blood Result: 12.9    Everolimus, Whole Blood (08.26.20 @ 06:18)    Everolimus, Whole Blood Result: 10.1      OTHER TESTS:  COVID-19 PCR: NotDetec (08-18-20 @ 13:14)  COVID-19 PCR: NotDetec (08-11-20 @ 20:08)      Ferritin, Serum: 764 (08-15 @ 21:12)    Blood Gas Venous - Lactate: 0.8 (08-28 @ 01:06)  Blood Gas Arterial, Lactate: 0.9 (08-27 @ 19:53)  Blood Gas Arterial, Lactate: 1.0 (08-27 @ 12:06)  Blood Gas Arterial, Lactate: 1.1 (08-27 @ 09:07)      RADIOLOGY & ADDITIONAL STUDIES:  < from: Xray Abdomen 1 View PORTABLE -Urgent (08.27.20 @ 13:11) >  IMPRESSION:    Gaseous distention of the small and large bowel without focal obstruction, consistent with ileus.    < end of copied text >        < from: CT Abdomen and Pelvis w/ Oral Cont (08.23.20 @ 13:30) >  IMPRESSION:  Right lower lobe consolidation with air bronchograms unchanged since 8/14/2020. This could represent atelectasis and/or infection. Correlate clinically. Previously described patchy opacities in the left lower lobe and lingula have resolved. Trace right pleural effusion.Small right pleural effusion.    Rectal wall thickening. New since the previous exam. Correlate clinically for proctitis. Mild distention of the colon proximal to this. No small bowel obstruction.    < end of copied text > INFECTIOUS DISEASES FOLLOW UP    This is a follow up note for this  69 yo Male with  s/p OHTx 2/18  severe C.diff  [place]    Interval event:  - Transferred back to 3CTU for persistent abd distension, NGT placed  - 2 BM yesterday, 2 BM overnight, 2 BM this morning  - Cr=2.86  - Albumin decreased to 2.9  - WBC=4.42  - CMV PCR (8/26): not detect  - Posaconazole/Atovaquone order was d/c'ed concerning for malabsorption   - Primary team switched everolimus to IV cyclosporine    ROS:  CONSTITUTIONAL:  No fever, good appetite  CARDIOVASCULAR:  No chest pain or palpitations  RESPIRATORY:  No dyspnea  GASTROINTESTINAL:  c/o abdominal distension and diarrhea  GENITOURINARY:  No dysuria  NEUROLOGIC:  No headache,     Allergies  No Known Allergies    ANTIMICROBIALS:    metroNIDAZOLE  IVPB    metroNIDAZOLE  IVPB 500 every 8 hours  vancomycin    Solution 500 every 6 hours      OTHER MEDS:  MEDICATIONS  (STANDING):  cycloSPORINE  (SandIMMUNE) IVPB 150 daily  heparin   Injectable 5000 every 8 hours  insulin lispro (HumaLOG) corrective regimen sliding scale  three times a day before meals  methylPREDNISolone sodium succinate Injectable 16 <User Schedule>  pantoprazole  Injectable 40 two times a day    Vital Signs Last 24 Hrs  T(F): 98.7 (08-28-20 @ 11:00), Max: 99.1 (08-23-20 @ 19:50)    Vital Signs Last 24 Hrs  HR: 129 (08-28-20 @ 12:00) (113 - 129)  BP: 111/71 (08-28-20 @ 12:00) (94/71 - 119/74)  RR: 16 (08-27-20 @ 20:00)  SpO2: 96% (08-28-20 @ 12:00) (96% - 100%)  Wt(kg): --      PHYSICAL EXAM:  Constitutional: no acute distress, but lying in bed instead of the chair  Eyes: WALT, EOMI  Ear/Nose/Throat: no oral lesions, 	  Respiratory: clear BL  Cardiovascular: S1S2 sternotomy healed  Gastrointestinal: hyperactive BS, very distended abd, not overly tender on exam  Extremities:no e/e/c  No Lymphadenopathy  IV sites not inflammed.    Labs:                        10.6   4.42  )-----------( 203      ( 28 Aug 2020 01:17 )             33.0     08-28    135  |  101  |  73<H>  ----------------------------<  86  3.5   |  21<L>  |  2.86<H>    Ca    8.4      28 Aug 2020 01:18  Phos  5.3     08-28  Mg     3.3     08-28    TPro  5.4<L>  /  Alb  2.9<L>  /  TBili  0.7  /  DBili  x   /  AST  29  /  ALT  20  /  AlkPhos  60  08-28      WBC Trend:  WBC Count: 4.42 (08-28-20 @ 01:17)  WBC Count: 4.80 (08-27-20 @ 00:26)  WBC Count: 6.38 (08-26-20 @ 14:35)  WBC Count: 5.83 (08-26-20 @ 04:54)        Creatine Trend:  Creatinine, Serum: 2.86 (08-28)  Creatinine, Serum: 2.86 (08-27)  Creatinine, Serum: 2.65 (08-27)  Creatinine, Serum: 2.96 (08-26)      Liver Biochemical Testing Trend:  Alanine Aminotransferase (ALT/SGPT): 20 (08-28)  Alanine Aminotransferase (ALT/SGPT): 22 (08-27)  Alanine Aminotransferase (ALT/SGPT): 27 (08-26)  Aspartate Aminotransferase (AST/SGOT): 29 (08-28-20 @ 01:18)  Aspartate Aminotransferase (AST/SGOT): 31 (08-27-20 @ 00:26)  Aspartate Aminotransferase (AST/SGOT): 34 (08-26-20 @ 14:35)  Aspartate Aminotransferase (AST/SGOT): 40 (08-26-20 @ 01:23)  Aspartate Aminotransferase (AST/SGOT): 40 (08-25-20 @ 19:15)  Bilirubin Total, Serum: 0.7 (08-28)  Bilirubin Total, Serum: 0.8 (08-27)  Bilirubin Total, Serum: 0.9 (08-26)  Bilirubin Total, Serum: 0.8 (08-26)  Bilirubin Total, Serum: 0.8 (08-25)      Trend LDH  08-26-20 @ 01:23  550<H>  06-17-20 @ 08:51  376<H>  06-17-20 @ 08:50  390<H>  06-02-20 @ 07:26  323<H>  06-01-20 @ 07:16  266<H>  05-31-20 @ 07:53  246<H>  05-30-20 @ 07:39  247<H>  05-29-20 @ 07:42  223  05-28-20 @ 07:45  216  05-27-20 @ 07:15  237  05-26-20 @ 08:29  256<H>  05-23-20 @ 11:15  253<H>          MICROBIOLOGY:      Culture - Blood (collected 25 Aug 2020 23:14)  Source: .Blood Triple Lumen BROWN  Preliminary Report:    No growth to date.    Culture - Blood (collected 24 Aug 2020 11:07)  Source: .Blood Blood  Preliminary Report:    No growth to date.    GI PCR Panel, Stool (collected 23 Aug 2020 02:01)  Source: .Stool Feces  Final Report:    GI PCR Results: NOT detected    Culture - Urine (collected 14 Aug 2020 13:32)  Source: .Urine Clean Catch (Midstream)  Final Report:    No growth    Culture - Blood (collected 14 Aug 2020 01:32)  Source: .Blood Blood  Final Report:    No Growth Final    Culture - Blood (collected 14 Aug 2020 01:32)  Source: .Blood Blood  Final Report:    No Growth Final    Culture - Blood (collected 13 Aug 2020 14:48)  Source: .Blood Blood-Peripheral  Final Report:    Growth in aerobic and anaerobic bottles: Klebsiella oxytoca/Raoutella    ornithinolytica    See previous culture [id number]    Culture - Blood (collected 13 Aug 2020 12:14)  Source: .Blood Blood-Peripheral  Final Report:    Growth in anaerobic bottle: Klebsiella oxytoca/Raoutella ornithinolytica    "Due to technical problems, Proteus sp. will Not be reported as part of    the BCID panel until further notice"    ***Blood Panel PCR results on this specimen are available    approximately 3 hours after the Gram stain result.***    Gram stain, PCR, and/or culture results may not always    correspond due to difference in methodologies.    ************************************************************    This PCR assay was performed using"[employer].    The following targets are tested for: Enterococcus,    vancomycin resistant enterococci, Listeria monocytogenes,    coagulase negative staphylococci, S. aureus,    methicillin resistant S. aureus, Streptococcus agalactiae    (Group B), S.pneumoniae, S. pyogenes (Group A),    Acinetobacter baumannii, Enterobacter cloacae, E. coli,    Klebsiella oxytoca, K. pneumoniae, Proteus sp.,    Serratia marcescens, Haemophilus influenzae,    Neisseria meningitidis, Pseudomonas aeruginosa, Candida    albicans, C. glabrata, C krusei, C parapsilosis,    C. tropicalis and the KPC resistance gene.  Organism: Blood Culture PCR  Klebsiella oxytoca /Raoutella ornithinolytica  Organism: Klebsiella oxytoca /Raoutella ornithinolytica    Sensitivities:      -  Amikacin: S <=16      -  Ampicillin: R >16 These ampicillin results predict results for amoxicillin      -  Ampicillin/Sulbactam: I 16/8 Enterobacter, Citrobacter, and Serratia may develop resistance during prolonged therapy (3-4 days)      -  Aztreonam: S <=4      -  Cefazolin: R >16 Enterobacter, Citrobacter, and Serratia may develop resistance during prolonged therapy (3-4 days)      -  Cefepime: S <=2      -  Cefoxitin: S <=8      -  Ceftriaxone: S <=1 Enterobacter, Citrobacter, and Serratia may develop resistance during prolonged therapy      -  Ciprofloxacin: S <=0.25      -  Ertapenem: S <=0.5      -  Gentamicin: S <=2      -  Imipenem: S <=1      -  Levofloxacin: S <=0.5      -  Meropenem: S <=1      -  Piperacillin/Tazobactam: S <=8      -  Tobramycin: S <=2      -  Trimethoprim/Sulfamethoxazole: R >2/38      Method Type: KAMILA  Organism: Blood Culture PCR    Sensitivities:      -  Klebsiella oxytoca: Detec      Method Type: PCR    Culture - Urine (collected 13 Aug 2020 00:40)  Source: .Urine Clean Catch (Midstream)  Final Report:    >=3 organisms. Probable collection contamination.    Culture - Blood (collected 17 Jun 2020 16:59)  Source: .Blood Blood-Peripheral  Final Report:    No Growth Final      HIV-1/2 Combo Result: Nonreact (05-02-20 @ 11:28)    ABS CD4: 120 /uL (08-17-18 @ 13:00)    HIV-1 RNA Quantitative, Viral Load:   NOT DET. (05-31-18 @ 19:10)  HIV-1 Viral Load Result: NOT DET. (05-31-18 @ 19:10)  HIV-1 RNA Quantitative, Viral Load:   NOT DET. (02-02-18 @ 19:25)  HIV-1 Viral Load Result: NOT DET. (02-02-18 @ 19:25)    Cytomegalovirus By PCR:   NotDetec      C Diff by PCR Result: Detected (08-23 @ 01:54)    Everolimus, Whole Blood (08.26.20 @ 13:51)    Everolimus, Whole Blood Result: 12.9    Everolimus, Whole Blood (08.26.20 @ 06:18)    Everolimus, Whole Blood Result: 10.1      OTHER TESTS:  COVID-19 PCR: NotDetec (08-18-20 @ 13:14)  COVID-19 PCR: NotDetec (08-11-20 @ 20:08)      Ferritin, Serum: 764 (08-15 @ 21:12)    Blood Gas Venous - Lactate: 0.8 (08-28 @ 01:06)  Blood Gas Arterial, Lactate: 0.9 (08-27 @ 19:53)  Blood Gas Arterial, Lactate: 1.0 (08-27 @ 12:06)  Blood Gas Arterial, Lactate: 1.1 (08-27 @ 09:07)      RADIOLOGY & ADDITIONAL STUDIES:  < from: Xray Abdomen 1 View PORTABLE -Urgent (08.27.20 @ 13:11) >  IMPRESSION:    Gaseous distention of the small and large bowel without focal obstruction, consistent with ileus.    < end of copied text >        < from: CT Abdomen and Pelvis w/ Oral Cont (08.23.20 @ 13:30) >  IMPRESSION:  Right lower lobe consolidation with air bronchograms unchanged since 8/14/2020. This could represent atelectasis and/or infection. Correlate clinically. Previously described patchy opacities in the left lower lobe and lingula have resolved. Trace right pleural effusion.Small right pleural effusion.    Rectal wall thickening. New since the previous exam. Correlate clinically for proctitis. Mild distention of the colon proximal to this. No small bowel obstruction.    < end of copied text >

## 2020-08-28 NOTE — PROGRESS NOTE ADULT - PROBLEM SELECTOR PLAN 1
-C/w statin for TCAD ppx  -ASA on hold given GIB  -Will consider d/c everolimus and changing to cyclosporine IV given unclear GI absorption currently  -Steroid taper as written  -C/w atovaquone and posaconazole for ppx; Bactrim and Valcyte on hold given leukopenia; reconsider use of posaconazole if coming off everolimus  -Off Cellcept while on high dose steroids and now Cdiff infxn  -Would maintain net even for today  -Check BMP BID  -Await repeat CMV PCR - statin for TCAD ppx on hold d/t ileus  -ASA on hold given GIB/ileus  -d/c everolimus and change to cyclosporine IV given unclear GI absorption currently and possible need for surgery  -Steroid taper as written  -on atovaquone and posaconazole for ppx; on hold d/t ileus  -Off Cellcept while on high dose steroids and now Cdiff infxn  -Would maintain net even for today  -Check BMP BID  - CMV negative 8/26

## 2020-08-28 NOTE — PROGRESS NOTE ADULT - SUBJECTIVE AND OBJECTIVE BOX
[facility] DIVISION OF KIDNEY DISEASES AND HYPERTENSION -- FOLLOW UP NOTE  --------------------------------------------------------------------------------  Ritchie Sykes   Nephrology Fellow  Pager NS: [phone]/ [facility]: [phone]  (After 5 pm or on weekends please page the on-call fellow)    24 hour events/subjective: Patient seen and examined at the bedside. Transferred back to CTU after ileus was noted on imaging, NGT placed for decompression. Pt still having diarrhea.         PAST HISTORY  --------------------------------------------------------------------------------  No significant changes to PMH, PSH, FHx, SHx, unless otherwise noted    ALLERGIES & MEDICATIONS  --------------------------------------------------------------------------------  Allergies    No Known Allergies    Intolerances      Standing Inpatient Medications  atovaquone Suspension 1500 milliGRAM(s) Oral daily  chlorhexidine 2% Cloths 1 Application(s) Topical <User Schedule>  everolimus (ZORTRESS) 0.25 milliGRAM(s) Oral <User Schedule>  heparin   Injectable 5000 Unit(s) SubCutaneous every 8 hours  insulin lispro (HumaLOG) corrective regimen sliding scale   SubCutaneous three times a day before meals  methylPREDNISolone sodium succinate Injectable 16 milliGRAM(s) IV Push <User Schedule>  metroNIDAZOLE  IVPB      metroNIDAZOLE  IVPB 500 milliGRAM(s) IV Intermittent every 8 hours  multiple electrolytes Injection Type 1 1000 milliLiter(s) IV Continuous <Continuous>  pantoprazole  Injectable 40 milliGRAM(s) IV Push two times a day  posaconazole DR Tablet 300 milliGRAM(s) Oral daily  sodium chloride 0.9% lock flush 3 milliLiter(s) IV Push every 8 hours  vancomycin    Solution 500 milliGRAM(s) Oral every 6 hours    PRN Inpatient Medications  benzocaine 15 mG/menthol 3.6 mG (Sugar-Free) Lozenge 1 Lozenge Oral every 6 hours PRN      REVIEW OF SYSTEMS  --------------------------------------------------------------------------------  Gen: No fevers/chills  Head/Eyes/Ears/Mouth: No headache; Normal hearing; Normal vision    Respiratory: + dyspnea  CV: No chest pain  GI: + abdominal distention, diarrhea  : No increased frequency, dysuria  MSK: +edema    All other systems were reviewed and are negative, except as noted.    >>> <<<    VITALS/PHYSICAL EXAM  --------------------------------------------------------------------------------  T(C): 36.8 (08-28-20 @ 07:00), Max: 36.8 (08-27-20 @ 14:24)  HR: 123 (08-28-20 @ 08:00) (113 - 131)  BP: 111/73 (08-28-20 @ 08:00) (94/71 - 119/74)  RR: 16 (08-27-20 @ 20:00) (16 - 38)  SpO2: 99% (08-28-20 @ 06:00) (95% - 100%)  Wt(kg): --        08-27-20 @ 07:01  -  08-28-20 @ 07:00  --------------------------------------------------------  IN: 1230 mL / OUT: 1010 mL / NET: 220 mL    08-28-20 @ 07:01  -  08-28-20 @ 08:12  --------------------------------------------------------  IN: 290 mL / OUT: 50 mL / NET: 240 mL      Physical Exam:    	Gen: NAD, NGT in place  	HEENT: Anicteric, dry oral mucoas  	Pulm: CTAB anteriorly  	CV: Tachycardic   	Abd: +BS, soft, mild TPP/ distended       	: + diaz with urine  	MSK: Warm, Anasarca appreciated  	Neuro: No focal deficits, AOX3, no asterixis       	Skin: no visible rashes  	Psych: Appropriate Affect  	Vascular Access: None      LABS/STUDIES  --------------------------------------------------------------------------------              10.6   4.42  >-----------<  203      [08-28-20 @ 01:17]              33.0     135  |  101  |  73  ----------------------------<  86      [08-28-20 @ 01:18]  3.5   |  21  |  2.86        Ca     8.4     [08-28-20 @ 01:18]      Mg     3.3     [08-28-20 @ 01:18]      Phos  5.3     [08-28-20 @ 01:18]    TPro  5.4  /  Alb  2.9  /  TBili  0.7  /  DBili  x   /  AST  29  /  ALT  20  /  AlkPhos  60  [08-28-20 @ 01:18]    PT/INR: PT 12.5 , INR 1.05       [08-27-20 @ 00:26]  PTT: 35.9       [08-28-20 @ 01:16]      Creatinine Trend:  SCr 2.86 [08-28 @ 01:18]  SCr 2.86 [08-27 @ 12:24]  SCr 2.65 [08-27 @ 00:26]  SCr 2.96 [08-26 @ 14:35]  SCr 2.92 [08-26 @ 01:23]    Urinalysis - [08-24-20 @ 21:27]      Color Yellow / Appearance Slightly Turbid / SG 1.018 / pH 5.5      Gluc Negative / Ketone Negative  / Bili Negative / Urobili <2 mg/dL       Blood Negative / Protein 30 mg/dL / Leuk Est Negative / Nitrite Negative      RBC 1 / WBC 3 / Hyaline 2 / Gran  / Sq Epi  / Non Sq Epi 2 / Bacteria Negative    Urine Creatinine 188      [08-24-20 @ 17:29]  Urine Sodium <35      [08-24-20 @ 17:29]  Urine Chloride <35      [08-24-20 @ 17:29]    Iron 36, TIBC 218, %sat 16      [08-15-20 @ 21:08]  Ferritin 764      [08-15-20 @ 21:12]  Vitamin D (25OH) 33.5      [04-30-20 @ 09:06]  HbA1c 4.7      [11-07-18 @ 23:18]  TSH 1.22      [08-12-20 @ 00:18]

## 2020-08-28 NOTE — PROGRESS NOTE ADULT - ASSESSMENT
70y old  Male with pmhx of  NICM s/p HM2 s/p OHT in 2018, autoimmune hemolytic anemia s/p steroids/rituxan, admitted for GIB c/b bacteremia, cdiff, and now NORMAN.    #NORMAN  - Etiology of NORMAN like multifactorial 2/2 infection, diarrhea, everolimus   - His baseline Scr ~1mg/dl, peaked at 3mg/dl on 8/25, now plateaued around 2.9mg/dl  - UA with 30 protein, no WBC or RBC, Urine Na<35, Urine Cl<35  - Urine studies consistent with CRS picture, could have ATN as well given the relative hypotension, lowest documented BP- 102/70. Last Everolimus trough was on 8/20 and noted to be elevated at ~10  - RHC on 8/25 with elevated pressures, given 2u pRBC and 40mg IV lasix with good response  - F/u HF team recs, agree with IV diuresis at this time as need for volume optimization, non-oliguric off of diuretics  - F/u surgery regarding ileus and colitis   - F/u everolimus level, may need to adjust dose in setting of diarrhea/NORMAN  - Monitor UOP and daily weights. Dose medications as per eGFR<20      #Azotemia  - 2/2 NORMAN and steroids, no evidence of uremia    #Metabolic Acidosis  - HAGMA with respiratory alkalosis   - Likely 2/2 NORMAN  - Monitor at this time    #Anemia  - 2/2 GIB, s/p 2 units on 8/25 with appropriate response  - Iron stores low, cannot give IV iron in setting of active infection  - pRBC transfusions per primary team  - No signs of active hemolysis

## 2020-08-28 NOTE — PROGRESS NOTE ADULT - ASSESSMENT
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 He has received a total of 3 units PRBC this admission with slow downtrend of hemoglobin. Labs were not consistent with hemolysis and he reported dark stools for which EGD/enteroscopy eventually revealed chronic gastritis and small bowel ectasias. He developed acute respiratory distress and profound sinus tachycardia on 8/13 in setting of Klebsiella bacteremia of unclear origin (preceded EGD) for which CT performed which suggests possible pneumonia. He has clinically improved with antibiotics, however he was found to have worsening abdominal distention and Cdiff infxn and was started on vanc PO as well as IV flagyl. Also notably has anasarca with RUE swelling and b/l LE swelling; RHC on 8/25 confirmed elev filling pressures, however at bedside CVP 8-10. Developed oliguric renal failure since 8/22 possibly 2/2 relative hypotension, improving. Has worsening abdominal distention with finding of ileus.     Review of pertinent studies  8/2020 labs: Direct Jacky +, 4.6% reticulocytes with low RI, normal bilirubin,  (stable). Haptoglobin normal.   8/25 RHC: RA 13, RV 48/14, PA 49/23/32, PCWP 20, Marino CI/CO 4.2/8.1. MAP 83.

## 2020-08-28 NOTE — PROGRESS NOTE ADULT - SUBJECTIVE AND OBJECTIVE BOX
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 Patient was treated w/ prednisone and Rituximab. Tacro was discontinued and patient was also transitioned to everolimus  Admitted  6/16-6/19  for hyperglycemia.  Reported to transplant team having one done black tarry stool-  instructed to  present to [facility] for hemolytic anemia. Patient has been following with Hematology outpatient.  Denies CP  N/V diarrhea SOB. (11 Aug 2020 20:08)      Surgery/Hospital Course:  8/11 Admitted to [facility] with symptomatic anemia  8/13 EGD for investigation of tarry stools  8/15 SB capsule: stomach & SB lesions, likely ulcers.  8/17 EGD/push enteroscopy w/ angioectasias/erosions in small bowel. Gastropathy and esophagitis. Ulcer seen on VCE most likely scope trauma.    8/18 VSS transferred back to floor.   8/20 VS 9.0/28.4 stable Gastric biopsy results LA Grade A esophagitis.  - Acute gastritis. Biopsies taken to r/o CMV, No ulceration seen despite finding on capsule endoscopy - likely 2/2 scope  trauma that has since resolved. Pathology pending.  8/21 VSS H/H  8.1/25.7  trending down will monitor prednisone taper 30 mg today. Procardia 120 initiated today RHC biopsy Monday.   8/22 VVS; Patient reports loose stools x 2-3 days with 1 episode today.  Stool sent for C-dif and GI PCR. Abdominal X-ray revealed: dilated loops of bowel. Abdomen distended.  Patient denies N/V. GI called to re-evaluate. Continue with current medication regimen.  Awaiting for upcoming cardiac biopsy on Monday 8/24.  8/23   vss    creat up to 2.28 ,  repeating CMP,  TTE ordered  Bladder scan   8/24   cardiac bx cancelled   elev creat  to 2.74   cardio renal cslt called,    + CDIF   inc vanco to 500 q6   ID following  8/25 VSS: RHC today as per transplant team; transfuse 2 units PRBC today as per Dr. Viramontes; continue vanco for cdiff; transfer patient to CTU after cath today   8/26. Dieretic challenge with good response   8/27: Downgraded to the floor then upgraded to the CTU again for concerning of abd distention     Today:    REVIEW OF SYSTEMS:  Gen: No fever  EYES/ENT: No visual changes;  No vertigo or throat pain   NECK: No pain   RES:  No shortness of breath or Cough  CARD: No chest pain   GI: No abdominal pain  : No dysuria  NEURO: No weakness  SKIN: No itching, rashes     ICU Vital Signs Last 24 Hrs  T(C): 37.1 (28 Aug 2020 11:00), Max: 37.1 (28 Aug 2020 11:00)  T(F): 98.7 (28 Aug 2020 11:00), Max: 98.7 (28 Aug 2020 11:00)  HR: 125 (28 Aug 2020 11:00) (113 - 125)  BP: 111/74 (28 Aug 2020 11:00) (94/71 - 119/74)  BP(mean): 88 (28 Aug 2020 11:00) (77 - 90)  ABP: --  ABP(mean): --  RR: 16 (27 Aug 2020 20:00) (16 - 18)  SpO2: 99% (28 Aug 2020 06:00) (98% - 100%)      Physical Exam:  Gen:  Awake, alert   CNS: non focal 	  Neck: no JVD  RES : clear , no wheezing              CVS: sinus tachycardia. Normal S1/S2  Abd: Soft, distended. Bowel sounds present.  Skin: No rash.  Ext:  no edema    ============================I/O===========================   I&O's Detail    27 Aug 2020 07:01  -  28 Aug 2020 07:00  --------------------------------------------------------  IN:    Albumin 5%  - 250 mL: 250 mL    multiple electrolytes Injection Type 1: 700 mL    Oral Fluid: 30 mL    Solution: 150 mL    Solution: 100 mL  Total IN: 1230 mL    OUT:    Indwelling Catheter - Urethral: 1010 mL  Total OUT: 1010 mL    Total NET: 220 mL      28 Aug 2020 07:01  -  28 Aug 2020 11:19  --------------------------------------------------------  IN:    Albumin 5%  - 250 mL: 250 mL    multiple electrolytes Injection Type 1: 160 mL    Solution: 104.3 mL  Total IN: 514.3 mL    OUT:    Indwelling Catheter - Urethral: 185 mL  Total OUT: 185 mL    Total NET: 329.3 mL        ============================ LABS =========================                        10.6   4.42  )-----------( 203      ( 28 Aug 2020 01:17 )             33.0     08-28    135  |  101  |  73<H>  ----------------------------<  86  3.5   |  21<L>  |  2.86<H>    Ca    8.4      28 Aug 2020 01:18  Phos  5.3     08-28  Mg     3.3     08-28    TPro  5.4<L>  /  Alb  2.9<L>  /  TBili  0.7  /  DBili  x   /  AST  29  /  ALT  20  /  AlkPhos  60  08-28    LIVER FUNCTIONS - ( 28 Aug 2020 01:18 )  Alb: 2.9 g/dL / Pro: 5.4 g/dL / ALK PHOS: 60 U/L / ALT: 20 U/L / AST: 29 U/L / GGT: x           PT/INR - ( 27 Aug 2020 00:26 )   PT: 12.5 sec;   INR: 1.05 ratio         PTT - ( 28 Aug 2020 01:16 )  PTT:35.9 sec  ABG - ( 27 Aug 2020 19:53 )  pH, Arterial: 7.42  pH, Blood: x     /  pCO2: 30    /  pO2: 118   / HCO3: 20    / Base Excess: -3.5  /  SaO2: 98                  ======================Micro/Rad/Cardio=================  Culture:     Culture - Blood in AM (08.13.20 @ 12:14)    Gram Stain:   Growth in anaerobic bottle: Gram Negative Rods    Specimen Source: .Blood Blood-Peripheral    Organism: Blood Culture PCR    Organism: Klebsiella oxytoca /Raoutella ornithinolytica    Culture Results:   Growth in anaerobic bottle: Klebsiella oxytoca/Raoutella ornithinolytica    Urgent Xray Chest 1- Portable (08.27.20 @ 17:59)   FINDINGS:  Enteric tube coursing below diaphragm with tip in the stomach.  Small right pleural effusion with basilar atelectasis.  Distended gas-filled loops of bowel in the upper abdomen.  Unchanged right hemidiaphragm elevation.  Left IJ central venous catheter with tip in the SVC.    TTE with Doppler (w/ 3D Echo) (08.23.20 @ 08:12)   Conclusions:  1. Normal trileaflet aortic valve.  2. Mildly dilated left atrium.  LA volume index = 36 cc/m2.  3. Normal left ventricular internal dimensions and wall  thicknesses.  4. Overall normal left ventricular systolic function. Mild  basal inferior wall hypokinesis, which has been noted on  prior studies.  5. Normal right ventricular size with mildly decreased  systolic function.  *** Compared with prior recent studies, no significant  changes noted.      ======================================================  PAST MEDICAL & SURGICAL HISTORY:  H/O hemolytic anemia  H/O autoimmune hemolytic anemia  Knee pain, right  HLD (hyperlipidemia)  Former smoker  DVT of upper extremity (deep vein thrombosis)  Hepatitis C virus  GIB (gastrointestinal bleeding)  Ventricular fibrillation: s/p AICD  PAF (paroxysmal atrial fibrillation): on xarelto  Non-Ischemic Cardiomyopathy  SVT (Supraventricular Tachycardia)  HTN  CHF (Congestive Heart Failure)  S/P right heart catheterization: biopsy multiple  H/O heart transplant: 2/2018  Status post left hip replacement  History of Prior Ablation Treatment: for afib  AICD (Automatic Cardioverter/Defibrillator) Present: [employer] with 1 [employer] lead4/1/09- explanted and replaced with Medtronic 2 leads on 9/2/09    ====================ASSESSMENT ==============  Heart transplant 2/2018 for ACC/AHA stage D NICM   Acute respiratory failure, resolved  Supraventricular tachycardia  Severe hypertension  Hyperlactatemia acidosis, resolved  Leukopenia  Acute blood loss anemia, stable  GI Bleed  CKD  C. diff diarrhea  Klebsiella oxytoca bacteremia  Sepsis  Azotemia 2/2 CKD and Steroids     Plan:  ====================== NEUROLOGY=====================  Nonfocal, Continue to monitor neurological status as per ICU protocol.     ==================== RESPIRATORY======================  Resolved acute respiratory failure, continues on 2L nasal cannula   Encourage incentive spirometry, continue pulse ox monitoring, follow ABGs     ====================CARDIOVASCULAR==================  Heart transplant 2/2018 for ACC/AHA stage D NICM, now on solumedrol from prednisone   (NPO for abd distention) and everolimus. off cellcept while on high dose steroids   Continue hemodynamic monitoring.  Not on any pressors.    methylPREDNISolone sodium succinate Injectable 16 milliGRAM(s) IV Push <User Schedule>    ===================HEMATOLOGIC/ONC ===================  Anemia s/p multiple blood transfusions  Monitor H&H and transfuse prn   Heparin for DVT prophylaxis.    heparin   Injectable 5000 Unit(s) SubCutaneous every 8 hours    ===================== RENAL =========================  NORMAN on CKD, Anasarca. lasix prn   Monitor I/Os, BUN/Creatinine, electrolytes.     ==================== GASTROINTESTINAL===================  NPO in setting of abd distention  Monitor abd distention,  no peritonitis.   Gen surgery following    multiple electrolytes Injection Type 1 1000 milliLiter(s) (40 mL/Hr) IV Continuous <Continuous>  GI prophylaxis, pantoprazole  Injectable 40 milliGRAM(s) IV Push two times a day  sodium chloride 0.9% lock flush 3 milliLiter(s) IV Push every 8 hours    =======================    ENDOCRINE  =====================  Stress hyperglycemia requiring glucose control with humalog sliding scale    insulin lispro (HumaLOG) corrective regimen sliding scale   SubCutaneous three times a day before meals    ========================INFECTIOUS DISEASE================  Clostridium difficile PCR positive, continue with vancomycin and metronidazole   Continue transplant prophylaxis with Posaconazole and Atovaquone   Monitor team, WBC- Leukopenic at 4.     metroNIDAZOLE  IVPB 500 milliGRAM(s) IV Intermittent every 8 hours  vancomycin    Solution 500 milliGRAM(s) Oral every 6 hours      Patient requires continuous monitoring with bedside rhythm monitoring, pulse ox monitoring, and intermittent blood gas analysis. Care plan discussed with ICU care team. Patient remained critical and required more than usual post op care.    By signing my name below, I, Breanna Newell, attest that this documentation has been prepared under the direction and in the presence of Eduard Hathaway NP   Electronically signed: Ab Ramírez, 08-28-20 @ 11:19    I, Eduard Hathaway, personally performed the services described in this documentation. all medical record entries made by the ab were at my direction and in my presence. 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 Patient was treated w/ prednisone and Rituximab. Tacro was discontinued and patient was also transitioned to everolimus  Admitted  6/16-6/19  for hyperglycemia.  Reported to transplant team having one done black tarry stool-  instructed to  present to [facility] for hemolytic anemia. Patient has been following with Hematology outpatient.  Denies CP  N/V diarrhea SOB. (11 Aug 2020 20:08)      Surgery/Hospital Course:  8/11 Admitted to [facility] with symptomatic anemia  8/13 EGD for investigation of tarry stools  8/15 SB capsule: stomach & SB lesions, likely ulcers.  8/17 EGD/push enteroscopy w/ angioectasias/erosions in small bowel. Gastropathy and esophagitis. Ulcer seen on VCE most likely scope trauma.    8/18 VSS transferred back to floor.   8/20 VS 9.0/28.4 stable Gastric biopsy results LA Grade A esophagitis.  - Acute gastritis. Biopsies taken to r/o CMV, No ulceration seen despite finding on capsule endoscopy - likely 2/2 scope  trauma that has since resolved. Pathology pending.  8/21 VSS H/H  8.1/25.7  trending down will monitor prednisone taper 30 mg today. Procardia 120 initiated today RHC biopsy Monday.   8/22 VVS; Patient reports loose stools x 2-3 days with 1 episode today.  Stool sent for C-dif and GI PCR. Abdominal X-ray revealed: dilated loops of bowel. Abdomen distended.  Patient denies N/V. GI called to re-evaluate. Continue with current medication regimen.  Awaiting for upcoming cardiac biopsy on Monday 8/24.  8/23   vss    creat up to 2.28 ,  repeating CMP,  TTE ordered  Bladder scan   8/24   cardiac bx cancelled   elev creat  to 2.74   cardio renal cslt called,    + CDIF   inc vanco to 500 q6   ID following  8/25 VSS: RHC today as per transplant team; transfuse 2 units PRBC today as per Dr. Viramontes; continue vanco for cdiff; transfer patient to CTU after cath today   8/26. Dieretic challenge with good response   8/27: Downgraded to the floor then upgraded to the CTU again for concerning of abd distention     Today: ileus abdomen distended firm nontender    REVIEW OF SYSTEMS:  Gen: No fever  EYES/ENT: No visual changes;  No vertigo or throat pain   NECK: No pain   RES:  No shortness of breath or Cough  CARD: No chest pain   GI: No abdominal pain, distented hypoactive BS  : No dysuria  NEURO: No weakness  SKIN: No itching, rashes     ICU Vital Signs Last 24 Hrs  T(C): 37.1 (28 Aug 2020 11:00), Max: 37.1 (28 Aug 2020 11:00)  T(F): 98.7 (28 Aug 2020 11:00), Max: 98.7 (28 Aug 2020 11:00)  HR: 125 (28 Aug 2020 11:00) (113 - 125)  BP: 111/74 (28 Aug 2020 11:00) (94/71 - 119/74)  BP(mean): 88 (28 Aug 2020 11:00) (77 - 90)  ABP: --  ABP(mean): --  RR: 16 (27 Aug 2020 20:00) (16 - 18)  SpO2: 99% (28 Aug 2020 06:00) (98% - 100%)      Physical Exam:  Gen:  Awake, alert   CNS: non focal 	  Neck: no JVD  RES : clear , no wheezing              CVS: sinus tachycardia. Normal S1/S2  Abd: Soft, distended. Bowel sounds present.nontender +NGT to LWS  Skin: No rash.  Ext: +1 pedal edema    ============================I/O===========================   I&O's Detail    27 Aug 2020 07:01  -  28 Aug 2020 07:00  --------------------------------------------------------  IN:    Albumin 5%  - 250 mL: 250 mL    multiple electrolytes Injection Type 1: 700 mL    Oral Fluid: 30 mL    Solution: 150 mL    Solution: 100 mL  Total IN: 1230 mL    OUT:    Indwelling Catheter - Urethral: 1010 mL  Total OUT: 1010 mL    Total NET: 220 mL      28 Aug 2020 07:01  -  28 Aug 2020 11:19  --------------------------------------------------------  IN:    Albumin 5%  - 250 mL: 250 mL    multiple electrolytes Injection Type 1: 160 mL    Solution: 104.3 mL  Total IN: 514.3 mL    OUT:    Indwelling Catheter - Urethral: 185 mL  Total OUT: 185 mL    Total NET: 329.3 mL        ============================ LABS =========================                        10.6   4.42  )-----------( 203      ( 28 Aug 2020 01:17 )             33.0     08-28    135  |  101  |  73<H>  ----------------------------<  86  3.5   |  21<L>  |  2.86<H>    Ca    8.4      28 Aug 2020 01:18  Phos  5.3     08-28  Mg     3.3     08-28    TPro  5.4<L>  /  Alb  2.9<L>  /  TBili  0.7  /  DBili  x   /  AST  29  /  ALT  20  /  AlkPhos  60  08-28    LIVER FUNCTIONS - ( 28 Aug 2020 01:18 )  Alb: 2.9 g/dL / Pro: 5.4 g/dL / ALK PHOS: 60 U/L / ALT: 20 U/L / AST: 29 U/L / GGT: x           PT/INR - ( 27 Aug 2020 00:26 )   PT: 12.5 sec;   INR: 1.05 ratio         PTT - ( 28 Aug 2020 01:16 )  PTT:35.9 sec  ABG - ( 27 Aug 2020 19:53 )  pH, Arterial: 7.42  pH, Blood: x     /  pCO2: 30    /  pO2: 118   / HCO3: 20    / Base Excess: -3.5  /  SaO2: 98                  ======================Micro/Rad/Cardio=================  Culture:     Culture - Blood in AM (08.13.20 @ 12:14)    Gram Stain:   Growth in anaerobic bottle: Gram Negative Rods    Specimen Source: .Blood Blood-Peripheral    Organism: Blood Culture PCR    Organism: Klebsiella oxytoca /Raoutella ornithinolytica    Culture Results:   Growth in anaerobic bottle: Klebsiella oxytoca/Raoutella ornithinolytica    Urgent Xray Chest 1- Portable (08.27.20 @ 17:59)   FINDINGS:  Enteric tube coursing below diaphragm with tip in the stomach.  Small right pleural effusion with basilar atelectasis.  Distended gas-filled loops of bowel in the upper abdomen.  Unchanged right hemidiaphragm elevation.  Left IJ central venous catheter with tip in the SVC.    TTE with Doppler (w/ 3D Echo) (08.23.20 @ 08:12)   Conclusions:  1. Normal trileaflet aortic valve.  2. Mildly dilated left atrium.  LA volume index = 36 cc/m2.  3. Normal left ventricular internal dimensions and wall  thicknesses.  4. Overall normal left ventricular systolic function. Mild  basal inferior wall hypokinesis, which has been noted on  prior studies.  5. Normal right ventricular size with mildly decreased  systolic function.  *** Compared with prior recent studies, no significant  changes noted.      ======================================================  PAST MEDICAL & SURGICAL HISTORY:  H/O hemolytic anemia  H/O autoimmune hemolytic anemia  Knee pain, right  HLD (hyperlipidemia)  Former smoker  DVT of upper extremity (deep vein thrombosis)  Hepatitis C virus  GIB (gastrointestinal bleeding)  Ventricular fibrillation: s/p AICD  PAF (paroxysmal atrial fibrillation): on xarelto  Non-Ischemic Cardiomyopathy  SVT (Supraventricular Tachycardia)  HTN  CHF (Congestive Heart Failure)  S/P right heart catheterization: biopsy multiple  H/O heart transplant: 2/2018  Status post left hip replacement  History of Prior Ablation Treatment: for afib  AICD (Automatic Cardioverter/Defibrillator) Present: [employer] with 1 [employer] lead4/1/09- explanted and replaced with Medtronic 2 leads on 9/2/09    ====================ASSESSMENT ==============  Heart transplant 2/2018 for ACC/AHA stage D NICM   Acute respiratory failure, resolved  Supraventricular tachycardia  Severe hypertension  Hyperlactatemia acidosis, resolved  Leukopenia  Acute blood loss anemia, stable  GI Bleed  CKD  C. diff diarrhea  Klebsiella oxytoca bacteremia  Sepsis  Azotemia 2/2 CKD and Steroids     Plan:  ====================== NEUROLOGY=====================  Nonfocal, Continue to monitor neurological status as per ICU protocol.     ==================== RESPIRATORY======================  Resolved acute respiratory failure, continues on 2L nasal cannula   Encourage incentive spirometry, continue pulse ox monitoring, follow ABGs     ====================CARDIOVASCULAR==================  Heart transplant 2/2018 for ACC/AHA stage D NICM, now on solumedrol from prednisone   (NPO for abd distention) and everolimus. off cellcept while on high dose steroids   Continue hemodynamic monitoring.  Not on any pressors.    methylPREDNISolone sodium succinate Injectable 16 milliGRAM(s) IV Push <User Schedule>    ===================HEMATOLOGIC/ONC ===================  Anemia s/p multiple blood transfusions  Monitor H&H and transfuse prn   Heparin for DVT prophylaxis.    heparin   Injectable 5000 Unit(s) SubCutaneous every 8 hours    ===================== RENAL =========================  NORMAN on CKD, Anasarca. lasix prn   Monitor I/Os, BUN/Creatinine, electrolytes.     ==================== GASTROINTESTINAL===================  NPO in setting of abd distention, NGT to LWS  Monitor abd distention,  no peritonitis.   Gen surgery following  GI follow up CT abdomen & pelvis ordered  multiple electrolytes Injection Type 1 1000 milliLiter(s) (40 mL/Hr) IV Continuous <Continuous>  GI prophylaxis, pantoprazole  Injectable 40 milliGRAM(s) IV Push two times a day  sodium chloride 0.9% lock flush 3 milliLiter(s) IV Push every 8 hours    =======================    ENDOCRINE  =====================  Stress hyperglycemia requiring glucose control with humalog sliding scale    insulin lispro (HumaLOG) corrective regimen sliding scale   SubCutaneous three times a day before meals    ========================INFECTIOUS DISEASE================  Clostridium difficile PCR positive, continue with vancomycin and metronidazole   Continue transplant prophylaxis with Posaconazole and Atovaquone   Monitor team, WBC- Leukopenic at 4.     metroNIDAZOLE  IVPB 500 milliGRAM(s) IV Intermittent every 8 hours  vancomycin    Solution 500 milliGRAM(s) Oral every 6 hours  OOb to chair    Patient requires continuous monitoring with bedside rhythm monitoring, pulse ox monitoring, and intermittent blood gas analysis. Care plan discussed with ICU care team. Patient remained critical and required more than usual post op care.    By signing my name below, I, Breanna Newell, attest that this documentation has been prepared under the direction and in the presence of Eduard Hathaway NP   Electronically signed: Katty Ramírez, 08-28-20 @ 11:19    IEduard, personally performed the services described in this documentation. all medical record entries made by the ramuibkurt were at my direction and in my presence. I have reviewed the chart and agree that the record reflects my personal performance and is accurate and complete  Electronically signed: Eduard Hathaway NP

## 2020-08-28 NOTE — PROGRESS NOTE ADULT - PROBLEM SELECTOR PLAN 2
-S/p mult pRBC this admission  -Repeat EGD/push enteroscopy w/ angioectasias/erosions. No e/o continued GIB.  -GI cleared use of ASA, but will hold for now given GIB and lack of strong indication  -Appreciate heme recs, no signs of hemolysis at this time  -Currently on methylpred IV given unclear PO absorption of PDN; c/w taper as tolerated  -C/w PPI  -Unclear ongoing dropping H/H; recheck hemolysis labs

## 2020-08-28 NOTE — PROGRESS NOTE ADULT - PROBLEM SELECTOR PLAN 3
-Appr hem/onc recs  -Likely 2/2 active infxn  -C/w tx for bacteremia and c. diff, as below  -C/w trend CBC  -CMV PCR 8/17 negative -Appr hem/onc recs  -Likely 2/2 active infxn  -C/w tx for bacteremia and c. diff, as below  -C/w trend CBC  -CMV PCR 8/26 negative

## 2020-08-28 NOTE — CONSULT NOTE ADULT - ASSESSMENT
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 Course c/b gram negative bacteremia s/p IV abx, further c/b ileus and fulminant c diff infection     #Ileus  -Evidence of ileus on abdominal x ray (8/27) in the setting of acute illness, on exam abdomen is distended   -NG tube in place, remains NPO   -Admits to passing gas and having BM   -r/o toxic megacolon?    #Severe c diff infection  -Noted to have watery dairrhea, Cdiff PCR + on 8/22; classified as severe c diff infection as also has NORMAN on CKD  -c/w PO vanco 500 q6h and flagyl  q8h [8/23-   -ID following    #NORMAN on CKD  #HF s/p transplant    Recommendations:   TO DISCUSS WITH ATTENDING Patient is a 70 y.o M w/ PMH NICM s/p OHT (2/2018) c/b coronary fistula and antibody mediated rejection (s/p IVIG, plasmpharesis, rituxan), HCV s/p tx, CKD, and recent hospitalization for autoimmune hemolytic anemia who initially presented to the hospital for anemia in the setting of melena s/p EGD/push enteroscopy and capsule endoscopy, now resolved. Course c/b gram negative bacteremia s/p IV abx, further c/b ileus and fulminant c diff infection     #Ileus  -Evidence of ileus on abdominal x ray (8/27) in the setting of acute illness, on exam abdomen is distended   -NG tube in place, remains NPO   -Admits to passing gas and having BM   -absent leukocytosis and normal bp, however, on immunosuppressants. Concern for possible toxic megacolon, f/u CT abd    #Severe c diff infection  -Noted to have watery dairrhea, Cdiff PCR + on 8/22; classified as severe c diff infection as also has NORMAN on CKD  -c/w PO vanco 500 q6h and flagyl  q8h [8/23-   -Concern that not absorbing abx properly; per ID, not a candidate for fecal transplant as immunocompromised  -ID following    #NORMAN on CKD  #HF s/p transplant    Recommendations:   -Patient w/ abdominal distention in the setting of +cdiff infection, concern for toxic megacolon vs. ileu  -F/u CT findings; based on read, consider calling surgery  -c/w PO vanc and IV flagyl; per ID, not a candidate for fecal transplant   -Recommend keeping NG tube in place for now; maintain NPO     D/W Dr. Carter Patient is a 70 y.o M w/ PMH NICM s/p OHT (2/2018) c/b coronary fistula and antibody mediated rejection (s/p IVIG, plasmpharesis, rituxan), HCV s/p tx, CKD, and recent hospitalization for autoimmune hemolytic anemia who initially presented to the hospital for anemia in the setting of melena s/p EGD/push enteroscopy and capsule endoscopy, now resolved. Course c/b gram negative bacteremia s/p IV abx, further c/b ileus and fulminant c diff infection     #Ileus  -Evidence of ileus on abdominal x ray (8/27) in the setting of acute illness, on exam abdomen is distended   -NG tube in place, remains NPO   -Admits to passing gas and having BM   -absent leukocytosis and normal bp, however, on immunosuppressants. Concern for possible toxic megacolon, f/u CT abd    #Severe c diff infection  -Noted to have watery dairrhea, Cdiff PCR + on 8/22; classified as severe c diff infection as also has NORMAN on CKD  -c/w PO vanco 500 q6h and flagyl  q8h [8/23-  -Consider vanco enemas if CT is unremarkable and if it's ok with ID  -Concern that not absorbing abx properly; per ID, not a candidate for fecal transplant as immunocompromised  -ID following    #NORMAN on CKD  #HF s/p transplant    Recommendations:   -Patient w/ abdominal distention in the setting of +cdiff infection, concern for toxic megacolon vs. ileu  -F/u CT findings; based on read, consider calling surgery  -c/w PO vanc and IV flagyl; per ID, not a candidate for fecal transplant   -Recommend keeping NG tube in place for now; maintain NPO     D/W Dr. Carter

## 2020-08-28 NOTE — CONSULT NOTE ADULT - SUBJECTIVE AND OBJECTIVE BOX
Patient is a 70y old  Male who presents with a chief complaint of SOB/anemia (28 Aug 2020 08:12)    Hospital course:     Patient is a 70 y.o M w/ PMH NICM s/p OHT (2/2018) c/b coronary fistula and antibody mediated rejection (s/p IVIG, plasmpharesis, rituxan), HCV s/p tx, CKD, and recent hospitalization for autoimmune hemolytic anemia who was initially sent by his Cardiologist for anemia in the setting of melena s/p total 5 u PRBC this hospitalization.  He underwent EGD/push enteroscopy 8/13 which was negative but  subsequent capsule endoscopy was suspicious for duodenal angioectasia/erosion and 1 small bowel angioetasia. Repeat EGD (8/17) revealed acute gastritis s/p biopsy, no evidence of ulcer. Melena resolved and was continued on PPI BID. Patient's course was c/b sepsis klebsiella bacteremia of unclear source s/p meropenem, cefepime and ceftriaxone. course further c/b SVT for which he was transferred to the CTICU. Further c/b worsening abd distention and diarrhea, found to be Cdiff PCR + and startedon PO vanc and IV flagyl (8/23). Noted to have worsening abd distention and pain and abd x ray revealed findings consistent w/ ileus. S/p NG tube placement.     At my encounter, patient was AOX3. Stated that he had diffuse tenderness to his abdomen and the distention has worsened this hospitalization. Denies N/V. Per RN, patient had 3 watery BM overnight an 1 non-bloody watery BM this AM.     Allergies    No Known Allergies    Intolerances      MEDICATIONS  (STANDING):  chlorhexidine 2% Cloths 1 Application(s) Topical <User Schedule>  cycloSPORINE  (SandIMMUNE) IVPB 150 milliGRAM(s) IV Intermittent daily  heparin   Injectable 5000 Unit(s) SubCutaneous every 8 hours  insulin lispro (HumaLOG) corrective regimen sliding scale   SubCutaneous three times a day before meals  methylPREDNISolone sodium succinate Injectable 16 milliGRAM(s) IV Push <User Schedule>  metroNIDAZOLE  IVPB      metroNIDAZOLE  IVPB 500 milliGRAM(s) IV Intermittent every 8 hours  multiple electrolytes Injection Type 1 1000 milliLiter(s) (40 mL/Hr) IV Continuous <Continuous>  pantoprazole  Injectable 40 milliGRAM(s) IV Push two times a day  sodium chloride 0.9% lock flush 3 milliLiter(s) IV Push every 8 hours  vancomycin    Solution 500 milliGRAM(s) Oral every 6 hours    MEDICATIONS  (PRN):  benzocaine 15 mG/menthol 3.6 mG (Sugar-Free) Lozenge 1 Lozenge Oral every 6 hours PRN Sore Throat      PAST MEDICAL & SURGICAL HISTORY:  H/O hemolytic anemia  H/O autoimmune hemolytic anemia  Knee pain, right  HLD (hyperlipidemia)  Former smoker  DVT of upper extremity (deep vein thrombosis)  Hepatitis C virus  GIB (gastrointestinal bleeding)  Ventricular fibrillation: s/p AICD  PAF (paroxysmal atrial fibrillation): on xarelto  Non-Ischemic Cardiomyopathy  SVT (Supraventricular Tachycardia)  HTN  CHF (Congestive Heart Failure)  S/P right heart catheterization: biopsy multiple  H/O heart transplant: 2/2018  Status post left hip replacement  History of Prior Ablation Treatment: for afib  AICD (Automatic Cardioverter/Defibrillator) Present: [employer] with 1 [employer] lead4/1/09- explanted and replaced with [employer] 2 leads on 9/2/09    FAMILY HISTORY:  No pertinent family history in first degree relatives      REVIEW OF SYSTEMS  All review of systems negative, except for those marked:  Constitutional:   No fever, no fatigue, no pallor.   HEENT:   No eye pain, no vision changes, no icterus, no mouth ulcers.  Respiratory:   No shortness of breath, no cough, no respiratory distress.   Cardiovascular:   No chest pain, no palpitations.   GI: See HPI  Skin:   No rashes, no jaundice, no eczema.   Musculoskeletal:   No joint pain, no swelling, no myalgia.   Neurologic:   No headache, no seizure, no weakness.   Genitourinary:   No dysuria, no decreased urine output.  Psychiatric:  No depression, no anxiety, no PDD, no ADHD.  Endocrine:   No thyroid disease, no diabetes.  Heme/Lymphatic:   No anemia, no blood transfusions, no lymph node enlargement, no bleeding, no bruising.    Daily     Daily   BMI: 26 (08-17 @ 14:29)  Change in Weight:  Vital Signs Last 24 Hrs  T(C): 36.8 (28 Aug 2020 07:00), Max: 36.8 (27 Aug 2020 14:24)  T(F): 98.2 (28 Aug 2020 07:00), Max: 98.3 (27 Aug 2020 14:24)  HR: 124 (28 Aug 2020 10:00) (113 - 124)  BP: 111/68 (28 Aug 2020 10:00) (94/71 - 119/74)  BP(mean): 85 (28 Aug 2020 10:00) (77 - 90)  RR: 16 (27 Aug 2020 20:00) (16 - 18)  SpO2: 99% (28 Aug 2020 06:00) (98% - 100%)  I&O's Detail    27 Aug 2020 07:01  -  28 Aug 2020 07:00  --------------------------------------------------------  IN:    Albumin 5%  - 250 mL: 250 mL    multiple electrolytes Injection Type 1: 700 mL    Oral Fluid: 30 mL    Solution: 150 mL    Solution: 100 mL  Total IN: 1230 mL    OUT:    Indwelling Catheter - Urethral: 1010 mL  Total OUT: 1010 mL    Total NET: 220 mL      28 Aug 2020 07:01  -  28 Aug 2020 10:37  --------------------------------------------------------  IN:    Albumin 5%  - 250 mL: 250 mL    multiple electrolytes Injection Type 1: 120 mL    Solution: 100 mL  Total IN: 470 mL    OUT:    Indwelling Catheter - Urethral: 140 mL  Total OUT: 140 mL    Total NET: 330 mL          PHYSICAL EXAM  General:  NG tube in palce  HEENT:    Normal appearance of conjunctiva, ears, nose, lips, oropharynx, and oral mucosa, anicteric.  Neck:  No masses, no asymmetry.  Lymph Nodes:  No lymphadenopathy.   Cardiovascular:  RRR normal S1/S2, no murmur.  Respiratory:  CTA B/L, normal respiratory effort.   Abdominal:  distended, firm, absent bowel sounds, diffuse tenderness to palpation   Extremities: 2+ pitting edema b/l   Skin:   No rash, jaundice, lesions, eczema.   Musculoskeletal:  No joint swelling, erythema or tenderness.   Neuro: No focal deficits.   Other:     Lab Results:                        10.6   4.42  )-----------( 203      ( 28 Aug 2020 01:17 )             33.0     08-28    135  |  101  |  73<H>  ----------------------------<  86  3.5   |  21<L>  |  2.86<H>    Ca    8.4      28 Aug 2020 01:18  Phos  5.3     08-28  Mg     3.3     08-28    TPro  5.4<L>  /  Alb  2.9<L>  /  TBili  0.7  /  DBili  x   /  AST  29  /  ALT  20  /  AlkPhos  60  08-28    LIVER FUNCTIONS - ( 28 Aug 2020 01:18 )  Alb: 2.9 g/dL / Pro: 5.4 g/dL / ALK PHOS: 60 U/L / ALT: 20 U/L / AST: 29 U/L / GGT: x           PT/INR - ( 27 Aug 2020 00:26 )   PT: 12.5 sec;   INR: 1.05 ratio         PTT - ( 28 Aug 2020 01:16 )  PTT:35.9 sec      Stool Results:          RADIOLOGY RESULTS:    SURGICAL PATHOLOGY: Patient is a 70y old  Male who presents with a chief complaint of SOB/anemia (28 Aug 2020 08:12)    Hospital course:     Patient is a 70 y.o M w/ PMH NICM s/p OHT (2/2018) c/b coronary fistula and antibody mediated rejection (s/p IVIG, plasmpharesis, rituxan), HCV s/p tx, CKD, and recent hospitalization for autoimmune hemolytic anemia who was initially sent by his Cardiologist for anemia in the setting of melena s/p total 5 u PRBC this hospitalization.  He underwent EGD/push enteroscopy 8/13 which was negative but  subsequent capsule endoscopy was suspicious for duodenal angioectasia/erosion and 1 small bowel angioetasia. Repeat EGD (8/17) revealed acute gastritis s/p biopsy, no evidence of ulcer. Melena resolved and was continued on PPI BID. Patient's course was c/b sepsis klebsiella bacteremia of unclear source s/p meropenem, cefepime and ceftriaxone. course further c/b SVT for which he was transferred to the CTICU. Further c/b worsening abd distention and diarrhea, found to be Cdiff PCR + and started on PO vanc and IV flagyl (8/23). Noted to have worsening abd distention and pain and abd x ray revealed findings consistent w/ ileus. S/p NG tube placement.     At my encounter, patient was AOX3. Stated that he had diffuse tenderness to his abdomen and the distention has worsened this hospitalization. Denies N/V. Per RN, patient had 3 watery BM overnight an 1 non-bloody watery BM this AM.     Allergies    No Known Allergies    Intolerances      MEDICATIONS  (STANDING):  chlorhexidine 2% Cloths 1 Application(s) Topical <User Schedule>  cycloSPORINE  (SandIMMUNE) IVPB 150 milliGRAM(s) IV Intermittent daily  heparin   Injectable 5000 Unit(s) SubCutaneous every 8 hours  insulin lispro (HumaLOG) corrective regimen sliding scale   SubCutaneous three times a day before meals  methylPREDNISolone sodium succinate Injectable 16 milliGRAM(s) IV Push <User Schedule>  metroNIDAZOLE  IVPB      metroNIDAZOLE  IVPB 500 milliGRAM(s) IV Intermittent every 8 hours  multiple electrolytes Injection Type 1 1000 milliLiter(s) (40 mL/Hr) IV Continuous <Continuous>  pantoprazole  Injectable 40 milliGRAM(s) IV Push two times a day  sodium chloride 0.9% lock flush 3 milliLiter(s) IV Push every 8 hours  vancomycin    Solution 500 milliGRAM(s) Oral every 6 hours    MEDICATIONS  (PRN):  benzocaine 15 mG/menthol 3.6 mG (Sugar-Free) Lozenge 1 Lozenge Oral every 6 hours PRN Sore Throat      PAST MEDICAL & SURGICAL HISTORY:  H/O hemolytic anemia  H/O autoimmune hemolytic anemia  Knee pain, right  HLD (hyperlipidemia)  Former smoker  DVT of upper extremity (deep vein thrombosis)  Hepatitis C virus  GIB (gastrointestinal bleeding)  Ventricular fibrillation: s/p AICD  PAF (paroxysmal atrial fibrillation): on xarelto  Non-Ischemic Cardiomyopathy  SVT (Supraventricular Tachycardia)  HTN  CHF (Congestive Heart Failure)  S/P right heart catheterization: biopsy multiple  H/O heart transplant: 2/2018  Status post left hip replacement  History of Prior Ablation Treatment: for afib  AICD (Automatic Cardioverter/Defibrillator) Present: [employer] with 1 [employer] lead4/1/09- explanted and replaced with [employer] 2 leads on 9/2/09    FAMILY HISTORY:  No pertinent family history in first degree relatives      REVIEW OF SYSTEMS  All review of systems negative, except for those marked:  Constitutional:   No fever, no fatigue, no pallor.   HEENT:   No eye pain, no vision changes, no icterus, no mouth ulcers.  Respiratory:   No shortness of breath, no cough, no respiratory distress.   Cardiovascular:   No chest pain, no palpitations.   GI: See HPI  Skin:   No rashes, no jaundice, no eczema.   Musculoskeletal:   No joint pain, no swelling, no myalgia.   Neurologic:   No headache, no seizure, no weakness.   Genitourinary:   No dysuria, no decreased urine output.  Psychiatric:  No depression, no anxiety, no PDD, no ADHD.  Endocrine:   No thyroid disease, no diabetes.  Heme/Lymphatic:   No anemia, no blood transfusions, no lymph node enlargement, no bleeding, no bruising.    Daily     Daily   BMI: 26 (08-17 @ 14:29)  Change in Weight:  Vital Signs Last 24 Hrs  T(C): 36.8 (28 Aug 2020 07:00), Max: 36.8 (27 Aug 2020 14:24)  T(F): 98.2 (28 Aug 2020 07:00), Max: 98.3 (27 Aug 2020 14:24)  HR: 124 (28 Aug 2020 10:00) (113 - 124)  BP: 111/68 (28 Aug 2020 10:00) (94/71 - 119/74)  BP(mean): 85 (28 Aug 2020 10:00) (77 - 90)  RR: 16 (27 Aug 2020 20:00) (16 - 18)  SpO2: 99% (28 Aug 2020 06:00) (98% - 100%)  I&O's Detail    27 Aug 2020 07:01  -  28 Aug 2020 07:00  --------------------------------------------------------  IN:    Albumin 5%  - 250 mL: 250 mL    multiple electrolytes Injection Type 1: 700 mL    Oral Fluid: 30 mL    Solution: 150 mL    Solution: 100 mL  Total IN: 1230 mL    OUT:    Indwelling Catheter - Urethral: 1010 mL  Total OUT: 1010 mL    Total NET: 220 mL      28 Aug 2020 07:01  -  28 Aug 2020 10:37  --------------------------------------------------------  IN:    Albumin 5%  - 250 mL: 250 mL    multiple electrolytes Injection Type 1: 120 mL    Solution: 100 mL  Total IN: 470 mL    OUT:    Indwelling Catheter - Urethral: 140 mL  Total OUT: 140 mL    Total NET: 330 mL          PHYSICAL EXAM  General:  NG tube in palce  HEENT:    Normal appearance of conjunctiva, ears, nose, lips, oropharynx, and oral mucosa, anicteric.  Neck:  No masses, no asymmetry.  Lymph Nodes:  No lymphadenopathy.   Cardiovascular:  RRR normal S1/S2, no murmur.  Respiratory:  CTA B/L, normal respiratory effort.   Abdominal:  distended, firm, bowel sounds, no increased tenderness to palpation, no rebound/guarding  Extremities: 2+ pitting edema b/l   Skin:   No rash, jaundice, lesions, eczema.   Musculoskeletal:  No joint swelling, erythema or tenderness.   Neuro: No focal deficits.   Other:     Lab Results:                        10.6   4.42  )-----------( 203      ( 28 Aug 2020 01:17 )             33.0     08-28    135  |  101  |  73<H>  ----------------------------<  86  3.5   |  21<L>  |  2.86<H>    Ca    8.4      28 Aug 2020 01:18  Phos  5.3     08-28  Mg     3.3     08-28    TPro  5.4<L>  /  Alb  2.9<L>  /  TBili  0.7  /  DBili  x   /  AST  29  /  ALT  20  /  AlkPhos  60  08-28    LIVER FUNCTIONS - ( 28 Aug 2020 01:18 )  Alb: 2.9 g/dL / Pro: 5.4 g/dL / ALK PHOS: 60 U/L / ALT: 20 U/L / AST: 29 U/L / GGT: x           PT/INR - ( 27 Aug 2020 00:26 )   PT: 12.5 sec;   INR: 1.05 ratio         PTT - ( 28 Aug 2020 01:16 )  PTT:35.9 sec      Stool Results:          RADIOLOGY RESULTS:    < from: Xray Abdomen 1 View PORTABLE -Urgent (08.27.20 @ 13:11) >  FINDINGS:    There is diffuse gaseous distention of both small and large bowel, worsened when compared with prior studies.  No focal obstruction is seen.  There are degenerative changes of the spine.  Surgical clips are seen over the right upper quadrant of the abdomen.  Redemonstrated is a left hip prosthesis.    IMPRESSION:    Gaseous distention of the small and large bowel without focal obstruction, consistent with ileus.    < end of copied text >      SURGICAL PATHOLOGY: patient

## 2020-08-28 NOTE — PROGRESS NOTE ADULT - SUBJECTIVE AND OBJECTIVE BOX
Patient seen and examined at bedside.    Overnight Events: This AM, pt c/o discomfort w/ NGT and requests it be removed. Continues to have sig abd distension, although is passing BMs.    Review Of Systems: No chest pain, shortness of breath, or palpitations            Current Meds:  benzocaine 15 mG/menthol 3.6 mG (Sugar-Free) Lozenge 1 Lozenge Oral every 6 hours PRN  chlorhexidine 2% Cloths 1 Application(s) Topical <User Schedule>  cycloSPORINE  (SandIMMUNE) IVPB 150 milliGRAM(s) IV Intermittent daily  heparin   Injectable 5000 Unit(s) SubCutaneous every 8 hours  insulin lispro (HumaLOG) corrective regimen sliding scale   SubCutaneous three times a day before meals  methylPREDNISolone sodium succinate Injectable 16 milliGRAM(s) IV Push <User Schedule>  metroNIDAZOLE  IVPB      metroNIDAZOLE  IVPB 500 milliGRAM(s) IV Intermittent every 8 hours  multiple electrolytes Injection Type 1 1000 milliLiter(s) IV Continuous <Continuous>  pantoprazole  Injectable 40 milliGRAM(s) IV Push two times a day  sodium chloride 0.9% lock flush 3 milliLiter(s) IV Push every 8 hours  vancomycin    Solution 500 milliGRAM(s) Oral every 6 hours      Vitals:  T(F): 98.7 (08-28), Max: 98.7 (08-28)  HR: 129 (08-28) (113 - 129)  BP: 111/71 (08-28) (94/71 - 119/74)  RR: 16 (08-27)  SpO2: 96% (08-28)  I&O's Summary    27 Aug 2020 07:01  -  28 Aug 2020 07:00  --------------------------------------------------------  IN: 1230 mL / OUT: 1010 mL / NET: 220 mL    28 Aug 2020 07:01  -  28 Aug 2020 12:54  --------------------------------------------------------  IN: 558.6 mL / OUT: 335 mL / NET: 223.6 mL        Physical Exam:  Gen: NAD.  HEENT: NCAT. NGT in place.  Neck: No JVP elev.  CV: Normal S1, S2. RRR. No MRG.  Chest: CTAB. No WRR.  Abd: +BSx4. Soft. Distended.  Ext: Diffuse edema.  Skin: No cyanosis.                          10.6   4.42  )-----------( 203      ( 28 Aug 2020 01:17 )             33.0     08-28    135  |  101  |  73<H>  ----------------------------<  86  3.5   |  21<L>  |  2.86<H>    Ca    8.4      28 Aug 2020 01:18  Phos  5.3     08-28  Mg     3.3     08-28    TPro  5.4<L>  /  Alb  2.9<L>  /  TBili  0.7  /  DBili  x   /  AST  29  /  ALT  20  /  AlkPhos  60  08-28    PT/INR - ( 27 Aug 2020 00:26 )   PT: 12.5 sec;   INR: 1.05 ratio         PTT - ( 28 Aug 2020 01:16 )  PTT:35.9 sec

## 2020-08-28 NOTE — PROGRESS NOTE ADULT - ATTENDING COMMENTS
I have seen this patient with the fellow and agree with their assessment and plan. In addition,    # NORMAN/ ATN - Baseline serum creatinine 1.0. Initial NORMAN episode was due to ATN as a result of hypotension. Subsequently had low effective arterial blood volume (UA - no evidence of blood or WBC, Urine Na<35) from cardiorenal syndrome and anemia. Serum creatinine reached a plateau of 2.8-2.9.   No uremic symptoms. No acute indication for dialysis.    # Edema/volume overload - patient was on IV diuretics yesterday. But noted that BP lower today, thus diuretics were held. Assess blood pressure off of diuretics. But he might eventually need diuretics again tomorrow.     # Anemia - S/p 2 units PRBC. Hb stable.     # Metabolic acidosis - secondary to NORMAN and diarrhea. Improved. Bicarb therapy is not indicated for now.     # Medication toxicity Monitoring: medication dose reviewed. Please dose medications to CrCl<15    # Hypokalemia - Serum potassium is 3.5. He likely had potassium loss through diarrhea and from furosemide. Replete K to keep K ~4.0    The rest of the recommendations as per fellow's note.    Umm Fang MD  Attending Nephrologist  [phone]

## 2020-08-28 NOTE — PROGRESS NOTE ADULT - ATTENDING COMMENTS
Patient seen and examined with Dr. Mckoy    I agree with his interval history exam and plans as noted above    Patient with C.diff pan colitis, sever disease based upon age, creatinine, immunocompromised state  Receiving Oral vancomycin 500mg qid plus IV flagyl 500mg tid  without significant improvement and with persistent abdominal distension on exam from suspected ileus  Plan to repeat abdominal CT scan to r/o toxic megacolon  Would consider administering IVIG for possible benefit for his C.diff     Gary Lujan MD  [phone]  After 5pm/weekends [phone]

## 2020-08-29 LAB
ALBUMIN SERPL ELPH-MCNC: 2.7 G/DL — LOW (ref 3.3–5)
ALP SERPL-CCNC: 61 U/L — SIGNIFICANT CHANGE UP (ref 40–120)
ALT FLD-CCNC: 18 U/L — SIGNIFICANT CHANGE UP (ref 10–45)
ANION GAP SERPL CALC-SCNC: 16 MMOL/L — SIGNIFICANT CHANGE UP (ref 5–17)
AST SERPL-CCNC: 27 U/L — SIGNIFICANT CHANGE UP (ref 10–40)
BILIRUB SERPL-MCNC: 0.9 MG/DL — SIGNIFICANT CHANGE UP (ref 0.2–1.2)
BUN SERPL-MCNC: 78 MG/DL — HIGH (ref 7–23)
CALCIUM SERPL-MCNC: 7.8 MG/DL — LOW (ref 8.4–10.5)
CHLORIDE SERPL-SCNC: 103 MMOL/L — SIGNIFICANT CHANGE UP (ref 96–108)
CO2 SERPL-SCNC: 20 MMOL/L — LOW (ref 22–31)
CREAT SERPL-MCNC: 3 MG/DL — HIGH (ref 0.5–1.3)
CULTURE RESULTS: SIGNIFICANT CHANGE UP
CYCLOSPORINE SER-MCNC: 470 NG/ML — HIGH (ref 150–400)
GLUCOSE BLDC GLUCOMTR-MCNC: 120 MG/DL — HIGH (ref 70–99)
GLUCOSE BLDC GLUCOMTR-MCNC: 85 MG/DL — SIGNIFICANT CHANGE UP (ref 70–99)
GLUCOSE BLDC GLUCOMTR-MCNC: 85 MG/DL — SIGNIFICANT CHANGE UP (ref 70–99)
GLUCOSE BLDC GLUCOMTR-MCNC: 88 MG/DL — SIGNIFICANT CHANGE UP (ref 70–99)
GLUCOSE BLDC GLUCOMTR-MCNC: 92 MG/DL — SIGNIFICANT CHANGE UP (ref 70–99)
GLUCOSE SERPL-MCNC: 91 MG/DL — SIGNIFICANT CHANGE UP (ref 70–99)
HCT VFR BLD CALC: 30.9 % — LOW (ref 39–50)
HGB BLD-MCNC: 10.1 G/DL — LOW (ref 13–17)
MAGNESIUM SERPL-MCNC: 3.2 MG/DL — HIGH (ref 1.6–2.6)
MCHC RBC-ENTMCNC: 30.1 PG — SIGNIFICANT CHANGE UP (ref 27–34)
MCHC RBC-ENTMCNC: 32.7 GM/DL — SIGNIFICANT CHANGE UP (ref 32–36)
MCV RBC AUTO: 92.2 FL — SIGNIFICANT CHANGE UP (ref 80–100)
NRBC # BLD: 0 /100 WBCS — SIGNIFICANT CHANGE UP (ref 0–0)
PHOSPHATE SERPL-MCNC: 4.2 MG/DL — SIGNIFICANT CHANGE UP (ref 2.5–4.5)
PLATELET # BLD AUTO: 159 K/UL — SIGNIFICANT CHANGE UP (ref 150–400)
POTASSIUM SERPL-MCNC: 3.3 MMOL/L — LOW (ref 3.5–5.3)
POTASSIUM SERPL-SCNC: 3.3 MMOL/L — LOW (ref 3.5–5.3)
PROT SERPL-MCNC: 5.1 G/DL — LOW (ref 6–8.3)
RBC # BLD: 3.35 M/UL — LOW (ref 4.2–5.8)
RBC # FLD: 19 % — HIGH (ref 10.3–14.5)
SODIUM SERPL-SCNC: 139 MMOL/L — SIGNIFICANT CHANGE UP (ref 135–145)
SPECIMEN SOURCE: SIGNIFICANT CHANGE UP
WBC # BLD: 4.77 K/UL — SIGNIFICANT CHANGE UP (ref 3.8–10.5)
WBC # FLD AUTO: 4.77 K/UL — SIGNIFICANT CHANGE UP (ref 3.8–10.5)

## 2020-08-29 PROCEDURE — 99291 CRITICAL CARE FIRST HOUR: CPT

## 2020-08-29 PROCEDURE — 99233 SBSQ HOSP IP/OBS HIGH 50: CPT | Mod: GC

## 2020-08-29 PROCEDURE — 99232 SBSQ HOSP IP/OBS MODERATE 35: CPT

## 2020-08-29 PROCEDURE — 71045 X-RAY EXAM CHEST 1 VIEW: CPT | Mod: 26

## 2020-08-29 RX ORDER — ALBUMIN HUMAN 25 %
250 VIAL (ML) INTRAVENOUS ONCE
Refills: 0 | Status: COMPLETED | OUTPATIENT
Start: 2020-08-29 | End: 2020-08-29

## 2020-08-29 RX ORDER — IMMUNE GLOBULIN (HUMAN) 10 G/100ML
60 INJECTION INTRAVENOUS; SUBCUTANEOUS EVERY 24 HOURS
Refills: 0 | Status: DISCONTINUED | OUTPATIENT
Start: 2020-08-29 | End: 2020-09-02

## 2020-08-29 RX ORDER — CYCLOSPORINE 100 MG/1
75 CAPSULE ORAL EVERY 24 HOURS
Refills: 0 | Status: DISCONTINUED | OUTPATIENT
Start: 2020-08-29 | End: 2020-08-29

## 2020-08-29 RX ADMIN — IMMUNE GLOBULIN (HUMAN) 75 GRAM(S): 10 INJECTION INTRAVENOUS; SUBCUTANEOUS at 09:18

## 2020-08-29 RX ADMIN — CHLORHEXIDINE GLUCONATE 1 APPLICATION(S): 213 SOLUTION TOPICAL at 05:55

## 2020-08-29 RX ADMIN — CYCLOSPORINE 2.15 MILLIGRAM(S): 100 CAPSULE ORAL at 09:18

## 2020-08-29 RX ADMIN — Medication 500 MILLIGRAM(S): at 08:05

## 2020-08-29 RX ADMIN — SODIUM CHLORIDE 3 MILLILITER(S): 9 INJECTION INTRAMUSCULAR; INTRAVENOUS; SUBCUTANEOUS at 13:17

## 2020-08-29 RX ADMIN — HEPARIN SODIUM 5000 UNIT(S): 5000 INJECTION INTRAVENOUS; SUBCUTANEOUS at 05:55

## 2020-08-29 RX ADMIN — SODIUM CHLORIDE 75 MILLILITER(S): 9 INJECTION, SOLUTION INTRAVENOUS at 22:11

## 2020-08-29 RX ADMIN — PANTOPRAZOLE SODIUM 40 MILLIGRAM(S): 20 TABLET, DELAYED RELEASE ORAL at 05:55

## 2020-08-29 RX ADMIN — Medication 16 MILLIGRAM(S): at 08:05

## 2020-08-29 RX ADMIN — Medication 100 MILLIGRAM(S): at 08:05

## 2020-08-29 RX ADMIN — Medication 500 MILLIGRAM(S): at 20:33

## 2020-08-29 RX ADMIN — Medication 500 MILLIGRAM(S): at 01:56

## 2020-08-29 RX ADMIN — Medication 100 MILLIGRAM(S): at 17:03

## 2020-08-29 RX ADMIN — Medication 125 MILLILITER(S): at 08:41

## 2020-08-29 RX ADMIN — Medication 100 MILLIGRAM(S): at 00:45

## 2020-08-29 RX ADMIN — SODIUM CHLORIDE 40 MILLILITER(S): 9 INJECTION, SOLUTION INTRAVENOUS at 01:55

## 2020-08-29 RX ADMIN — PANTOPRAZOLE SODIUM 40 MILLIGRAM(S): 20 TABLET, DELAYED RELEASE ORAL at 17:04

## 2020-08-29 RX ADMIN — HEPARIN SODIUM 5000 UNIT(S): 5000 INJECTION INTRAVENOUS; SUBCUTANEOUS at 13:22

## 2020-08-29 RX ADMIN — SODIUM CHLORIDE 3 MILLILITER(S): 9 INJECTION INTRAMUSCULAR; INTRAVENOUS; SUBCUTANEOUS at 21:16

## 2020-08-29 RX ADMIN — HEPARIN SODIUM 5000 UNIT(S): 5000 INJECTION INTRAVENOUS; SUBCUTANEOUS at 22:03

## 2020-08-29 RX ADMIN — Medication 500 MILLIGRAM(S): at 13:22

## 2020-08-29 RX ADMIN — SODIUM CHLORIDE 3 MILLILITER(S): 9 INJECTION INTRAMUSCULAR; INTRAVENOUS; SUBCUTANEOUS at 04:47

## 2020-08-29 NOTE — PROGRESS NOTE ADULT - SUBJECTIVE AND OBJECTIVE BOX
Follow Up:  C diff    Interval History/ROS:  patient reports that he is feeling better, RN at bedside notes some continued diarrhea of modest volume    Allergies  No Known Allergies    ANTIMICROBIALS:  metroNIDAZOLE  IVPB    metroNIDAZOLE  IVPB 500 every 8 hours  vancomycin    Solution 500 every 6 hours      OTHER MEDS:  MEDICATIONS  (STANDING):  cycloSPORINE  (SandIMMUNE) IVPB 75 every 24 hours  heparin   Injectable 5000 every 8 hours  immune   globulin 10% (GAMMAGARD) IVPB 60 every 24 hours  insulin lispro (HumaLOG) corrective regimen sliding scale  three times a day before meals  methylPREDNISolone sodium succinate Injectable 16 <User Schedule>  pantoprazole  Injectable 40 two times a day      Vital Signs Last 24 Hrs  T(C): 37.1 (29 Aug 2020 08:00), Max: 37.2 (28 Aug 2020 20:00)  T(F): 98.7 (29 Aug 2020 08:00), Max: 99 (28 Aug 2020 20:00)  HR: 117 (29 Aug 2020 10:00) (117 - 135)  BP: 94/58 (29 Aug 2020 10:00) (76/52 - 114/71)  BP(mean): 70 (29 Aug 2020 10:00) (58 - 88)  RR: 18 (29 Aug 2020 08:00) (18 - 20)  SpO2: 100% (29 Aug 2020 10:00) (94% - 100%)    PHYSICAL EXAM:  General:  NAD, Non-toxic  Neurology: A&Ox3, nonfocal  NGT to suction  Respiratory: Clear to auscultation bilaterally  CV: RRR, S1S2, no murmurs, rubs or gallops  Abdominal: distended,  non tender to light palpation, +bowel sounds, tympany  Extremities: generalized edema  Line Sites: Clear  Skin: No rash                        10.1   4.77  )-----------( 159      ( 29 Aug 2020 01:03 )             30.9   WBC Count: 4.77 (08-29 @ 01:03)  WBC Count: 4.42 (08-28 @ 01:17)  WBC Count: 4.80 (08-27 @ 00:26)  WBC Count: 6.38 (08-26 @ 14:35)  WBC Count: 5.83 (08-26 @ 04:54)  WBC Count: 8.34 (08-25 @ 19:15)  WBC Count: see note (08-25 @ 10:54)    08-29    139  |  103  |  78<H>  ----------------------------<  91  3.3<L>   |  20<L>  |  3.00<H>    Ca    7.8<L>      29 Aug 2020 01:02  Phos  4.2     08-29  Mg     3.2     08-29    TPro  5.1<L>  /  Alb  2.7<L>  /  TBili  0.9  /  DBili  x   /  AST  27  /  ALT  18  /  AlkPhos  61  08-29      MICROBIOLOGY:  .Blood Triple Lumen BROWN  08-25-20   No growth to date.  --  --      .Blood Blood  08-24-20   No growth to date.  --  --      .Stool Feces  08-23-20   GI PCR Results: NOT detected      .Urine Clean Catch (Midstream)  08-14-20   No growth  --  --      .Blood Blood  08-14-20   No Growth Final  --  --      .Blood Blood-Peripheral  08-13-20   Growth in aerobic and anaerobic bottles: Klebsiella oxytoca/Raoutella  ornithinolytica  See previous culture [id number]  --    Growth in anaerobic bottle: Gram Negative Rods  Growth in aerobic bottle: Gram Negative Rods      .Blood Blood-Peripheral  08-13-20   Growth in anaerobic bottle: Klebsiella oxytoca/Raoutella ornithinolytica    .Urine Clean Catch (Midstream)  08-13-20   >=3 organisms. Probable collection contamination.  --  --    C Diff by PCR Result: Detected (08-23 @ 01:54)    C Diff by PCR Result: Detected (08-23-20 @ 01:54)  Clostridium difficile GDH Toxins A&amp;B, EIA:   Indeterminate (08-22-20 @ 18:22)  Clostridium difficile GDH Interpretation: This specimen is positive for C. Difficile glutamate dehydrogenase (GDH)  antigen and negative for C. Difficile Toxins A & B, by EIA.  (08-22-20 @ 18:22)      RADIOLOGY: flims viewed and interpreted by me  < from: CT Abdomen and Pelvis No Cont (08.28.20 @ 16:18) >  BOWEL: Enteric tube side-port in the distal esophagus. There is thickening of the entire colon. The colon is gas-filled. No bowel obstruction. Appendix is not visualized. No evidence of inflammation in the right lower quadrant.  PERITONEUM: Small ascites.  VESSELS: Atherosclerotic changes.  RETROPERITONEUM/LYMPH NODES: No lymphadenopathy.  ABDOMINAL WALL: Anasarca. Unchanged hyperdensity in the right mid anterior abdomen that may represent scarring (3:191). A couple of foci of gas in the right anterior abdominal wall, likely related to recent injection (3:209). Lipoma in the right lateral thigh.  BONES: Left hip arthroplasty. Degenerative changes.    IMPRESSION:  Debris, likely mucous plug, within the bronchus intermedius resulting in complete atelectasis of the right middle and lower lobes.    Resolution of patchy opacities previously seen in the left upper and lower lobes.    Pancolitis.    A tubular calcification adjacent to the distal aspect of the left IJ central venous catheter that may represent a fibrin sheath.    Enteric tube side-port is in the distal esophagus.    < end of copied text >      Ricky Pope MD; Division of Infectious Disease; Pager: [phone]; nights and weekends: [phone]

## 2020-08-29 NOTE — PROGRESS NOTE ADULT - SUBJECTIVE AND OBJECTIVE BOX
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 Patient was treated w/ prednisone and Rituximab. Tacro was discontinued and patient was also transitioned to everolimus  Admitted  6/16-6/19  for hyperglycemia.  Reported to transplant team having one done black tarry stool-  instructed to  present to [facility] for hemolytic anemia. Patient has been following with Hematology outpatient.  Denies CP  N/V diarrhea SOB. (11 Aug 2020 20:08)      Surgery/Hospital Course:  8/11 Admitted to [facility] with symptomatic anemia  8/13 EGD for investigation of tarry stools  8/15 SB capsule: stomach & SB lesions, likely ulcers.  8/17 EGD/push enteroscopy w/ angioectasias/erosions in small bowel. Gastropathy and esophagitis. Ulcer seen on VCE most likely scope trauma.    8/18 VSS transferred back to floor.   8/20 VS 9.0/28.4 stable Gastric biopsy results LA Grade A esophagitis.  - Acute gastritis. Biopsies taken to r/o CMV, No ulceration seen despite finding on capsule endoscopy - likely 2/2 scope  trauma that has since resolved. Pathology pending.  8/21 VSS H/H  8.1/25.7  trending down will monitor prednisone taper 30 mg today. Procardia 120 initiated today RHC biopsy Monday.   8/22 VVS; Patient reports loose stools x 2-3 days with 1 episode today.  Stool sent for C-dif and GI PCR. Abdominal X-ray revealed: dilated loops of bowel. Abdomen distended.  Patient denies N/V. GI called to re-evaluate. Continue with current medication regimen.  Awaiting for upcoming cardiac biopsy on Monday 8/24.  8/23   vss    creat up to 2.28 ,  repeating CMP,  TTE ordered  Bladder scan   8/24   cardiac bx cancelled   elev creat  to 2.74   cardio renal cslt called,    + CDIF   inc vanco to 500 q6   ID following  8/25 VSS: RHC today as per transplant team; transfuse 2 units PRBC today as per Dr. Viramontes; continue vanco for cdiff; transfer patient to CTU after cath today   8/26. Dieretic challenge with good response   8/27: Downgraded to the floor then upgraded to the CTU again for concerning of abd distention   8/28 CT ABD & Pelvis reveals pancolitis    Today:    REVIEW OF SYSTEMS:  Gen: No fever  EYES/ENT: No visual changes;  No vertigo or throat pain   NECK: No pain   RES:  No shortness of breath or Cough  CARD: No chest pain   GI: No abdominal pain  : No dysuria  NEURO: No weakness  SKIN: No itching, rashes     ICU Vital Signs Last 24 Hrs  T(C): 36.9 (29 Aug 2020 04:00), Max: 37.2 (28 Aug 2020 20:00)  T(F): 98.5 (29 Aug 2020 04:00), Max: 99 (28 Aug 2020 20:00)  HR: 126 (29 Aug 2020 07:00) (122 - 135)  BP: 95/60 (29 Aug 2020 07:00) (84/61 - 114/71)  BP(mean): 71 (29 Aug 2020 07:00) (66 - 88)  ABP: --  ABP(mean): --  RR: 20 (28 Aug 2020 17:00) (20 - 20)  SpO2: 97% (29 Aug 2020 07:00) (94% - 100%)      Physical Exam:  Gen:  Awake, alert   CNS: non focal 	  Neck: no JVD  RES : clear , no wheezing              CVS: Sinus Tachycardia. Normal S1/S2  Abd: Soft, non-tender, distended. Bowel sounds present. +NGT to LWS  Skin: No rash.  Ext:  +1 pedal edema    ============================I/O===========================   I&O's Detail    28 Aug 2020 07:01  -  29 Aug 2020 07:00  --------------------------------------------------------  IN:    Albumin 5%  - 250 mL: 250 mL    Enteral Tube Flush: 60 mL    multiple electrolytes Injection Type 1: 960 mL    Solution: 200 mL    Solution: 190.3 mL  Total IN: 1660.3 mL    OUT:    Indwelling Catheter - Urethral: 840 mL    Nasoenteral Tube: 150 mL  Total OUT: 990 mL    Total NET: 670.3 mL        ============================ LABS =========================                        10.1   4.77  )-----------( 159      ( 29 Aug 2020 01:03 )             30.9     08-29    139  |  103  |  78<H>  ----------------------------<  91  3.3<L>   |  20<L>  |  3.00<H>    Ca    7.8<L>      29 Aug 2020 01:02  Phos  4.2     08-29  Mg     3.2     08-29    TPro  5.1<L>  /  Alb  2.7<L>  /  TBili  0.9  /  DBili  x   /  AST  27  /  ALT  18  /  AlkPhos  61  08-29    LIVER FUNCTIONS - ( 29 Aug 2020 01:02 )  Alb: 2.7 g/dL / Pro: 5.1 g/dL / ALK PHOS: 61 U/L / ALT: 18 U/L / AST: 27 U/L / GGT: x           PTT - ( 28 Aug 2020 01:16 )  PTT:35.9 sec  ABG - ( 27 Aug 2020 19:53 )  pH, Arterial: 7.42  pH, Blood: x     /  pCO2: 30    /  pO2: 118   / HCO3: 20    / Base Excess: -3.5  /  SaO2: 98                  ======================Micro/Rad/Cardio=================  Culture: Reviewed   CXR: Reviewed  Echo:Reviewed  ======================================================  PAST MEDICAL & SURGICAL HISTORY:  H/O hemolytic anemia  H/O autoimmune hemolytic anemia  Knee pain, right  HLD (hyperlipidemia)  Former smoker  DVT of upper extremity (deep vein thrombosis)  Hepatitis C virus  GIB (gastrointestinal bleeding)  Ventricular fibrillation: s/p AICD  PAF (paroxysmal atrial fibrillation): on xarelto  Non-Ischemic Cardiomyopathy  SVT (Supraventricular Tachycardia)  HTN  CHF (Congestive Heart Failure)  S/P right heart catheterization: biopsy multiple  H/O heart transplant: 2/2018  Status post left hip replacement  History of Prior Ablation Treatment: for afib  AICD (Automatic Cardioverter/Defibrillator) Present: [employer] with 1 [employer] lead4/1/09- explanted and replaced with Medtronic 2 leads on 9/2/09    ====================ASSESSMENT ==============  Heart transplant 2/2018 for ACC/AHA stage D NICM   Acute respiratory failure, resolved  Supraventricular tachycardia  Severe hypertension  Hyperlactatemia acidosis, resolved  Leukopenia  Acute blood loss anemia, stable  GI Bleed  CKD  C. diff diarrhea  Klebsiella oxytoca bacteremia  Sepsis  Azotemia 2/2 CKD and Steroids   Pancolitis    Plan:  ====================== NEUROLOGY=====================  Nonfocal, Continue to monitor neurological status as per ICU protocol.   Continue physical therapy, out of bed to chair as tolerated    ==================== RESPIRATORY======================  Resolved acute respiratory failure, continues on 2L nasal cannula   Encourage incentive spirometry, continue pulse ox monitoring, follow ABGs     ====================CARDIOVASCULAR==================  Heart transplant 2/2018 for ACC/AHA stage D NICM, now on solumedrol from prednisone   (NPO for abd distention) and everolimus. off cellcept while on high dose steroids   Continue hemodynamic monitoring.  Not on any pressors.    methylPREDNISolone sodium succinate Injectable 16 milliGRAM(s) IV Push <User Schedule>    ===================HEMATOLOGIC/ONC ===================  Anemia s/p multiple blood transfusions  Monitor H&H and transfuse prn     DVT prophylaxis, heparin   Injectable 5000 Unit(s) SubCutaneous every 8 hours    ===================== RENAL =========================  NORMAN on CKD, Anasarca. lasix prn   Monitor I/Os, BUN/Creatinine, electrolytes.     ==================== GASTROINTESTINAL===================  NPO in setting of abd distention, NGT to LWS  Monitor abd distention,  no peritonitis.   Gen surgery following  GI follow up CT abdomen & pelvis ordered  Follow up CT ABD & Pelvis on 8/28 reveals pancolitis    multiple electrolytes Injection Type 1 1000 milliLiter(s) (40 mL/Hr) IV Continuous <Continuous>  GI prophylaxis,pantoprazole  Injectable 40 milliGRAM(s) IV Push two times a day  sodium chloride 0.9% lock flush 3 milliLiter(s) IV Push every 8 hours    =======================    ENDOCRINE  =====================  Stress hyperglycemia requiring glucose control with humalog sliding scale    insulin lispro (HumaLOG) corrective regimen sliding scale   SubCutaneous three times a day before meals    ========================INFECTIOUS DISEASE================  Clostridium difficile PCR positive, continue with vancomycin and metronidazole   Continue transplant prophylaxis with Posaconazole and Atovaquone   Monitor team, WBC uptrending- Leukopenic at 4.7 from 4.4      metroNIDAZOLE  IVPB 500 milliGRAM(s) IV Intermittent every 8 hours  vancomycin    Solution 500 milliGRAM(s) Oral every 6 hours      Patient requires continuous monitoring with bedside rhythm monitoring, pulse ox monitoring, and intermittent blood gas analysis. Care plan discussed with ICU care team. Patient remained critical and required more than usual post op care.    By signing my name below, I, Breanna Newell, attest that this documentation has been prepared under the direction and in the presence of SHELLY Romero   Electronically signed: Katty Ramírez, 08-29-20 @ 07:49    I, Jeana Lemons , personally performed the services described in this documentation. all medical record entries made by the ramuibkurt were at my direction and in my presence. 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 Patient was treated w/ prednisone and Rituximab. Tacro was discontinued and patient was also transitioned to everolimus  Admitted  6/16-6/19  for hyperglycemia.  Reported to transplant team having one done black tarry stool-  instructed to  present to [facility] for hemolytic anemia. Patient has been following with Hematology outpatient.  Denies CP  N/V diarrhea SOB. (11 Aug 2020 20:08)      Surgery/Hospital Course:  8/11 Admitted to [facility] with symptomatic anemia  8/13 EGD for investigation of tarry stools  8/15 SB capsule: stomach & SB lesions, likely ulcers.  8/17 EGD/push enteroscopy w/ angioectasias/erosions in small bowel. Gastropathy and esophagitis. Ulcer seen on VCE most likely scope trauma.    8/18 VSS transferred back to floor.   8/20 VS 9.0/28.4 stable Gastric biopsy results LA Grade A esophagitis.  - Acute gastritis. Biopsies taken to r/o CMV, No ulceration seen despite finding on capsule endoscopy - likely 2/2 scope  trauma that has since resolved. Pathology pending.  8/21 VSS H/H  8.1/25.7  trending down will monitor prednisone taper 30 mg today. Procardia 120 initiated today RHC biopsy Monday.   8/22 VVS; Patient reports loose stools x 2-3 days with 1 episode today.  Stool sent for C-dif and GI PCR. Abdominal X-ray revealed: dilated loops of bowel. Abdomen distended.  Patient denies N/V. GI called to re-evaluate. Continue with current medication regimen.  Awaiting for upcoming cardiac biopsy on Monday 8/24.  8/23   vss    creat up to 2.28 ,  repeating CMP,  TTE ordered  Bladder scan   8/24   cardiac bx cancelled   elev creat  to 2.74   cardio renal cslt called,    + CDIF   inc vanco to 500 q6   ID following  8/25 VSS: RHC today as per transplant team; transfuse 2 units PRBC today as per Dr. Viramontes; continue vanco for cdiff; transfer patient to CTU after cath today   8/26. Dieretic challenge with good response   8/27: Downgraded to the floor then upgraded to the CTU again for concerning of abd distention   8/28 CT ABD & Pelvis reveals pancolitis    Today: CT ABD and Pelvis reveals Pancolitis    REVIEW OF SYSTEMS:  Gen: No fever  EYES/ENT: No visual changes;  No vertigo or throat pain   NECK: No pain   RES:  No shortness of breath or Cough  CARD: No chest pain   GI: No abdominal pain  : No dysuria  NEURO: No weakness  SKIN: No itching, rashes     ICU Vital Signs Last 24 Hrs  T(C): 36.9 (29 Aug 2020 04:00), Max: 37.2 (28 Aug 2020 20:00)  T(F): 98.5 (29 Aug 2020 04:00), Max: 99 (28 Aug 2020 20:00)  HR: 126 (29 Aug 2020 07:00) (122 - 135)  BP: 95/60 (29 Aug 2020 07:00) (84/61 - 114/71)  BP(mean): 71 (29 Aug 2020 07:00) (66 - 88)  ABP: --  ABP(mean): --  RR: 20 (28 Aug 2020 17:00) (20 - 20)  SpO2: 97% (29 Aug 2020 07:00) (94% - 100%)      Physical Exam:  Gen:  Awake, alert   CNS: non focal 	  Neck: no JVD  RES : clear , no wheezing              CVS: Sinus Tachycardia. Normal S1/S2  Abd: Soft, non-tender, distended. Bowel sounds present. +NGT to LWS  Skin: No rash.  Ext:  +1 pedal edema    ============================I/O===========================   I&O's Detail    28 Aug 2020 07:01  -  29 Aug 2020 07:00  --------------------------------------------------------  IN:    Albumin 5%  - 250 mL: 250 mL    Enteral Tube Flush: 60 mL    multiple electrolytes Injection Type 1: 960 mL    Solution: 200 mL    Solution: 190.3 mL  Total IN: 1660.3 mL    OUT:    Indwelling Catheter - Urethral: 840 mL    Nasoenteral Tube: 150 mL  Total OUT: 990 mL    Total NET: 670.3 mL        ============================ LABS =========================                        10.1   4.77  )-----------( 159      ( 29 Aug 2020 01:03 )             30.9     08-29    139  |  103  |  78<H>  ----------------------------<  91  3.3<L>   |  20<L>  |  3.00<H>    Ca    7.8<L>      29 Aug 2020 01:02  Phos  4.2     08-29  Mg     3.2     08-29    TPro  5.1<L>  /  Alb  2.7<L>  /  TBili  0.9  /  DBili  x   /  AST  27  /  ALT  18  /  AlkPhos  61  08-29    LIVER FUNCTIONS - ( 29 Aug 2020 01:02 )  Alb: 2.7 g/dL / Pro: 5.1 g/dL / ALK PHOS: 61 U/L / ALT: 18 U/L / AST: 27 U/L / GGT: x           PTT - ( 28 Aug 2020 01:16 )  PTT:35.9 sec  ABG - ( 27 Aug 2020 19:53 )  pH, Arterial: 7.42  pH, Blood: x     /  pCO2: 30    /  pO2: 118   / HCO3: 20    / Base Excess: -3.5  /  SaO2: 98                  ======================Micro/Rad/Cardio=================  Culture: Reviewed   CXR: Reviewed  Echo:Reviewed  ======================================================  PAST MEDICAL & SURGICAL HISTORY:  H/O hemolytic anemia  H/O autoimmune hemolytic anemia  Knee pain, right  HLD (hyperlipidemia)  Former smoker  DVT of upper extremity (deep vein thrombosis)  Hepatitis C virus  GIB (gastrointestinal bleeding)  Ventricular fibrillation: s/p AICD  PAF (paroxysmal atrial fibrillation): on xarelto  Non-Ischemic Cardiomyopathy  SVT (Supraventricular Tachycardia)  HTN  CHF (Congestive Heart Failure)  S/P right heart catheterization: biopsy multiple  H/O heart transplant: 2/2018  Status post left hip replacement  History of Prior Ablation Treatment: for afib  AICD (Automatic Cardioverter/Defibrillator) Present: [employer] with 1 [employer] lead4/1/09- explanted and replaced with Medtronic 2 leads on 9/2/09    ====================ASSESSMENT ==============  Heart transplant 2/2018 for ACC/AHA stage D NICM   Acute respiratory failure, resolved  Supraventricular tachycardia  Severe hypertension  Hyperlactatemia acidosis, resolved  Leukopenia  Acute blood loss anemia, stable  GI Bleed  CKD  C. diff diarrhea  Klebsiella oxytoca bacteremia  Sepsis  Azotemia 2/2 CKD and Steroids   Pancolitis    Plan:  ====================== NEUROLOGY=====================  Nonfocal, Continue to monitor neurological status as per ICU protocol.   Continue physical therapy, out of bed to chair as tolerated    ==================== RESPIRATORY======================  Resolved acute respiratory failure, continues on 2L nasal cannula   Encourage incentive spirometry, continue pulse ox monitoring, follow ABGs     ====================CARDIOVASCULAR==================  Heart transplant 2/2018 for ACC/AHA stage D NICM, now on solumedrol from prednisone   (NPO for abd distention) and everolimus. off cellcept while on high dose steroids   Continue hemodynamic monitoring.  Not on any pressors.    methylPREDNISolone sodium succinate Injectable 16 milliGRAM(s) IV Push <User Schedule>    ===================HEMATOLOGIC/ONC ===================  Anemia s/p multiple blood transfusions  Monitor H&H and transfuse prn     DVT prophylaxis, heparin   Injectable 5000 Unit(s) SubCutaneous every 8 hours    ===================== RENAL =========================  NORMAN on CKD, Anasarca. lasix prn   Monitor I/Os, BUN/Creatinine, electrolytes.     ==================== GASTROINTESTINAL===================  NPO in setting of abd distention, NGT to LWS  Monitor abd distention,  no peritonitis.   Gen surgery following  GI follow up CT abdomen & pelvis ordered  Follow up CT ABD & Pelvis on 8/28 reveals pancolitis    multiple electrolytes Injection Type 1 1000 milliLiter(s) (40 mL/Hr) IV Continuous <Continuous>  GI prophylaxis,pantoprazole  Injectable 40 milliGRAM(s) IV Push two times a day  sodium chloride 0.9% lock flush 3 milliLiter(s) IV Push every 8 hours    =======================    ENDOCRINE  =====================  Stress hyperglycemia requiring glucose control with humalog sliding scale    insulin lispro (HumaLOG) corrective regimen sliding scale   SubCutaneous three times a day before meals    ========================INFECTIOUS DISEASE================  Clostridium difficile PCR positive, continue with vancomycin and metronidazole   Continue transplant prophylaxis with Posaconazole and Atovaquone   Monitor team, WBC uptrending- Leukopenic at 4.7 from 4.4      metroNIDAZOLE  IVPB 500 milliGRAM(s) IV Intermittent every 8 hours  vancomycin    Solution 500 milliGRAM(s) Oral every 6 hours      Patient requires continuous monitoring with bedside rhythm monitoring, pulse ox monitoring, and intermittent blood gas analysis. Care plan discussed with ICU care team. Patient remained critical and required more than usual post op care.    By signing my name below, I, Breanna Newell, attest that this documentation has been prepared under the direction and in the presence of SHELLY Romero   Electronically signed: Ab Ramírez, 08-29-20 @ 07:49    I, Jeana Lemons , personally performed the services described in this documentation. all medical record entries made by the ab were at my direction and in my presence. I have reviewed the chart and agree that the record reflects my personal performance and is accurate and complete  Electronically signed: SHELLY Romero

## 2020-08-29 NOTE — PROGRESS NOTE ADULT - SUBJECTIVE AND OBJECTIVE BOX
[facility] DIVISION OF KIDNEY DISEASES AND HYPERTENSION   -- FOLLOW UP NOTE --   Mikey Jay  Nephrology Fellow  Pager NS: [phone]   /  Pager LISKIP: [phone]  (after 5pm or weekend please page the on-call fellow)  --------------------------------------------------------------------------------  24 hour events/subjective:  - yesterday CT A/P showed pancolitis  - overnight no events reported, vitals afebrile, BP 90s/60s, total UOP  diaz 825 cc in the past 24hr  - patient seen and examined at bedside this morning without complaints sitting comfortably in chair  - vitals/lab/medications reviewed, noted for uptrend sCr 2.8 to 3.0      PAST HISTORY  --------------------------------------------------------------------------------  No significant changes to PMH, PSH, FHx, SHx, unless otherwise noted    ALLERGIES & MEDICATIONS  --------------------------------------------------------------------------------  Allergies    No Known Allergies    Intolerances    Standing Inpatient Medications  chlorhexidine 2% Cloths 1 Application(s) Topical <User Schedule>  cycloSPORINE  (SandIMMUNE) IVPB 75 milliGRAM(s) IV Intermittent every 24 hours  heparin   Injectable 5000 Unit(s) SubCutaneous every 8 hours  immune   globulin 10% (GAMMAGARD) IVPB 60 Gram(s) IV Intermittent every 24 hours  insulin lispro (HumaLOG) corrective regimen sliding scale   SubCutaneous three times a day before meals  methylPREDNISolone sodium succinate Injectable 16 milliGRAM(s) IV Push <User Schedule>  metroNIDAZOLE  IVPB      metroNIDAZOLE  IVPB 500 milliGRAM(s) IV Intermittent every 8 hours  multiple electrolytes Injection Type 1 1000 milliLiter(s) IV Continuous <Continuous>  pantoprazole  Injectable 40 milliGRAM(s) IV Push two times a day  sodium chloride 0.9% lock flush 3 milliLiter(s) IV Push every 8 hours  vancomycin    Solution 500 milliGRAM(s) Oral every 6 hours    PRN Inpatient Medications  benzocaine 15 mG/menthol 3.6 mG (Sugar-Free) Lozenge 1 Lozenge Oral every 6 hours PRN    REVIEW OF SYSTEMS  --------------------------------------------------------------------------------  Gen: no fever, chills, weakness  Respiratory: No dyspnea, cough  CV: No chest pain, orthopnea  GI: No abdominal pain, nausea, vomiting, diarrhea  MSK: no edema  Neuro: No dizziness, lightheadedness  Heme: No bleeding  All other systems were reviewed and are negative, except as noted.    VITALS/PHYSICAL EXAM  --------------------------------------------------------------------------------  T(C): 37.1 (08-29-20 @ 08:00), Max: 37.2 (08-28-20 @ 20:00)  HR: 117 (08-29-20 @ 10:00) (117 - 135)  BP: 94/58 (08-29-20 @ 10:00) (76/52 - 114/71)  RR: 18 (08-29-20 @ 08:00) (18 - 20)  SpO2: 100% (08-29-20 @ 10:00) (94% - 100%)  Wt(kg): --    08-28-20 @ 07:01  -  08-29-20 @ 07:00  --------------------------------------------------------  IN: 1660.3 mL / OUT: 990 mL / NET: 670.3 mL    08-29-20 @ 07:01  -  08-29-20 @ 10:32  --------------------------------------------------------  IN: 686.5 mL / OUT: 55 mL / NET: 631.5 mL    Physical Exam:  	Gen: NAD on nasal cannula  	HEENT: NGT in place  	Pulm: CTA B/l anteriorly  	CV: Tachycardic   	Abd: +BS, soft, NTND       	: + diaz catheter in place w/ urine  	MSK: Warm, bilateral lower ext edema  	Neuro: No focal deficits, AOX3, no asterixis   	Psych: Appropriate Affect  	Vascular Access: None    LABS/STUDIES  --------------------------------------------------------------------------------              10.1   4.77  >-----------<  159      [08-29-20 @ 01:03]              30.9     139  |  103  |  78  ----------------------------<  91      [08-29-20 @ 01:02]  3.3   |  20  |  3.00        Ca     7.8     [08-29-20 @ 01:02]      Mg     3.2     [08-29-20 @ 01:02]      Phos  4.2     [08-29-20 @ 01:02]    TPro  5.1  /  Alb  2.7  /  TBili  0.9  /  DBili  x   /  AST  27  /  ALT  18  /  AlkPhos  61  [08-29-20 @ 01:02]      PTT: 35.9       [08-28-20 @ 01:16]    Creatinine Trend:  SCr 3.00 [08-29 @ 01:02]  SCr 2.86 [08-28 @ 01:18]  SCr 2.86 [08-27 @ 12:24]  SCr 2.65 [08-27 @ 00:26]  SCr 2.96 [08-26 @ 14:35]    Urinalysis - [08-24-20 @ 21:27]      Color Yellow / Appearance Slightly Turbid / SG 1.018 / pH 5.5      Gluc Negative / Ketone Negative  / Bili Negative / Urobili <2 mg/dL       Blood Negative / Protein 30 mg/dL / Leuk Est Negative / Nitrite Negative      RBC 1 / WBC 3 / Hyaline 2 / Gran  / Sq Epi  / Non Sq Epi 2 / Bacteria Negative    Urine Creatinine 188      [08-24-20 @ 17:29]  Urine Sodium <35      [08-24-20 @ 17:29]  Urine Chloride <35      [08-24-20 @ 17:29]    Iron 36, TIBC 218, %sat 16      [08-15-20 @ 21:08]  Ferritin 764      [08-15-20 @ 21:12]  Vitamin D (25OH) 33.5      [04-30-20 @ 09:06]  HbA1c 4.7      [11-07-18 @ 23:18]  TSH 1.22      [08-12-20 @ 00:18]

## 2020-08-29 NOTE — PROGRESS NOTE ADULT - ASSESSMENT
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 He has responded appropriately to a single blood transfusion. Of note, he recently has developed dark colored stools. Will investigate if anemia is due to GI bleeding in setting of steroid use or hemolysis and manage appropriately. Underwent UGI without bleeding source identified. Developed Klebsiella oxytoca bacteremia requiring transfer to CTU. Clinically improving, transferred to [facility], finished 10-day of ceftriaxone, however, developed severe C.diff colitis on Vancomycin 500mg q6h PO and IV Flagyl. He had RHC on 8/25 and found to have high filling pressure so transferred to CTU again.    #C.diff PCR detected (8/23): severe C.diff colitis  - Overnight 8/22 developed multiple episodes of watery diarrhea  - Abdominal distension noted on exam  - GI- PCR was negative for pathogens  - Abdominal CT(8/23): Rectal wall thickening. New since the previous exam. Correlate clinically for proctitis. Mild distention of the colon proximal to this. No small bowel obstruction.  - Due to significant NORMAN >50% Cr increase, meets criteria of severe C.diff infection. c/w PO Vancomycin to 500mg q6h (from 125mg q6h) + IV flagyl 500mg q8h  - Trend daily CBC, CMP (for albumin), fever curve  - Surgery following, no acute OR  - Repeat CT A/P demonstrates colonic distension  patient reports subjective improvement, does not have toxic megacolon  I am skeptical about benefit of IVIG for refractory C.diff: Tigycline has had benefit for severe Cdiff - Eravacyline has potent activity against Cdiff and can be considered if progresses; would continue current Vanco/IV Flagyl    #Klebsiella oxytoca bacteremia:  Meropenem (8/14-8/17)  Cefepime (8/17-20)  Ceftriaxone (8/20-)  - Growing in blood cultures 2/2 sets on 8/13  - Unclear source  - Repeat blood cultures x2 sets 8/14 no growth final  - s/p ceftriaxone until Monday 8/24 to finish a 10-day course    #OI prophylaxis-  -has been receiving high dose steroids since 5/20  -On IV cyclosproine now  -CMV viral load(8/17, 8/26): neg  -Valcyte and Bactrim d/c'ed on 8/17 due to leukopenia  -Galactomann<0.5, Fungitell<31  -Per hemoc, will taper prednisone every 7 days by 10mg  -Will consider IV bactrim for PCP ppx next week    #Pancytopenia- on admission  -Remains with significant leukopenia- especially lymphocyte lines  -COVID PCR neg  -Tick panel neg    #Anemia- r/o GI bleeding    EGD 8/17: esophagitis, acute gastritis s/p biopsy, no ulceration  On PPI 40mg daily    #anasarca/protein malnutrition   resume diet when improves    discussed with heart failure attending, CTU PA

## 2020-08-29 NOTE — PROGRESS NOTE ADULT - PROBLEM SELECTOR PLAN 6
-Appr ID input  -Cdiff PCR positive  -C/w vanc PO and metronidazole  -will trial IVIG despite unclear benefit  -Contact precautions  -Appr gen sx input  -NGT in place; will defer removal to GI/gen sx/ID  -CT A/P with pancolitis

## 2020-08-29 NOTE — PROGRESS NOTE ADULT - ASSESSMENT
70y old  Male with pmhx of  NICM s/p HM2 s/p OHT in 2018, autoimmune hemolytic anemia s/p steroids/rituxan, admitted for GIB c/b bacteremia, cdiff, and now NORMAN.    #NORMAN   - non oliguric NORMAN like multifactorial 2/2 infection, diarrhea, everolimus   - His baseline Scr ~1mg/dl, peaked at 3 mg/dl on 8/25, now plateaued 2.9-3.0  - UA with 30 protein, no WBC or RBC, Urine Na<35, Urine Cl<35  - Urine studies consistent with CRS picture, could have ATN as well given the relative hypotension, lowest documented BP- 102/70. Last Everolimus trough was on 8/20 and noted to be elevated at ~10  - RHC on 8/25 with elevated pressures, given 2u pRBC and 40mg IV lasix with good response 8/25-26  - currently off diuretics with good UOP, okay hold for now, monitor UOP  - F/u surgery following for ileus and colitis   - F/u everolimus level, may need to adjust dose in setting of diarrhea/NORMAN  - Monitor UOP and daily weights. Dose medications as per eGFR<20      #Azotemia  - 2/2 NORMAN and steroids, no evidence of uremia    #Metabolic Acidosis  - HAGMA with respiratory alkalosis   - Likely 2/2 NORMAN  - continue to monitor    #Anemia  - 2/2 GIB, s/p 2 units on 8/25 with appropriate response  - Iron stores low, cannot give IV iron in setting of active infection  - pRBC transfusions per primary team  - No signs of active hemolysis 70y old  Male with pmhx of  NICM s/p HM2 s/p OHT in 2018, autoimmune hemolytic anemia s/p steroids/rituxan, admitted for GIB c/b bacteremia, cdiff, and now NORMAN.    #NORMAN   - non oliguric NORMAN like multifactorial 2/2 infection, diarrhea, everolimus   - His baseline Scr ~1mg/dl, peaked at 3 mg/dl on 8/25, now plateaued 2.9-3.0  - UA with 30 protein, no WBC or RBC, Urine Na<35, Urine Cl<35  - Urine studies consistent with CRS picture, could have ATN as well given the relative hypotension, lowest documented BP- 102/70. Last Everolimus trough was on 8/20 and noted to be elevated at ~10  - RHC on 8/25 with elevated pressures, given 2u pRBC and 40mg IV lasix with good response 8/25-26  - currently off diuretics with good UOP, okay hold for now, monitor UOP. Consider IVF.   - F/u surgery following for ileus and colitis   - F/u everolimus level, may need to adjust dose in setting of diarrhea/NORMAN  - Monitor UOP and daily weights. Dose medications as per eGFR<20      #Azotemia  - 2/2 NORMAN with use of diuretics and steroids, no evidence of uremia. Consider IVF.     #Metabolic Acidosis  - HAGMA with respiratory alkalosis   - Likely 2/2 NORMAN  - continue to monitor    #Anemia  - 2/2 GIB, s/p 2 units on 8/25 with appropriate response  - Iron stores low, cannot give IV iron in setting of active infection  - pRBC transfusions per primary team  - No signs of active hemolysis

## 2020-08-29 NOTE — PROGRESS NOTE ADULT - SUBJECTIVE AND OBJECTIVE BOX
Interval History:  feels slightly better  abdomen still distended    Medications:  benzocaine 15 mG/menthol 3.6 mG (Sugar-Free) Lozenge 1 Lozenge Oral every 6 hours PRN  chlorhexidine 2% Cloths 1 Application(s) Topical <User Schedule>  heparin   Injectable 5000 Unit(s) SubCutaneous every 8 hours  immune   globulin 10% (GAMMAGARD) IVPB 60 Gram(s) IV Intermittent every 24 hours  insulin lispro (HumaLOG) corrective regimen sliding scale   SubCutaneous three times a day before meals  methylPREDNISolone sodium succinate Injectable 16 milliGRAM(s) IV Push <User Schedule>  metroNIDAZOLE  IVPB      metroNIDAZOLE  IVPB 500 milliGRAM(s) IV Intermittent every 8 hours  multiple electrolytes Injection Type 1 1000 milliLiter(s) IV Continuous <Continuous>  pantoprazole  Injectable 40 milliGRAM(s) IV Push two times a day  sodium chloride 0.9% lock flush 3 milliLiter(s) IV Push every 8 hours  vancomycin    Solution 500 milliGRAM(s) Oral every 6 hours      Vitals:  T(C): 35.6 (20 @ 16:00), Max: 37.2 (20 @ 20:00)  HR: 107 (20 @ 17:00) (106 - 135)  BP: 95/62 (20 @ 17:00) (76/52 - 112/69)  BP(mean): 74 (20 @ 17:00) (58 - 84)  RR: 18 (20 @ 16:00) (15 - 21)  SpO2: 98% (20 @ 17:00) (94% - 100%)    Daily     Daily Weight in k.2 (29 Aug 2020 00:00)        I&O's Summary    28 Aug 2020 07:01  -  29 Aug 2020 07:00  --------------------------------------------------------  IN: 1660.3 mL / OUT: 990 mL / NET: 670.3 mL    29 Aug 2020 07:01  -  29 Aug 2020 17:38  --------------------------------------------------------  IN: 1108.7 mL / OUT: 240 mL / NET: 868.7 mL        Physical Exam:  Appearance: No Acute Distress  HEENT: PERRL  Neck: JVD approx 8-10  Cardiovascular: Normal S1 S2, No murmurs/rubs/gallops  Respiratory: Clear to auscultation bilaterally  Gastrointestinal: tympanic; nontender; distended	  Skin: No cyanosis	  Neurologic: Non-focal  Extremities: b/l LE edema  Psychiatry: A & O x 3, Mood & affect appropriate    Labs:                        10.1   4.77  )-----------( 159      ( 29 Aug 2020 01:03 )             30.9     08-    139  |  103  |  78<H>  ----------------------------<  91  3.3<L>   |  20<L>  |  3.00<H>    Ca    7.8<L>      29 Aug 2020 01:02  Phos  4.2     -  Mg     3.2     -    TPro  5.1<L>  /  Alb  2.7<L>  /  TBili  0.9  /  DBili  x   /  AST  27  /  ALT  18  /  AlkPhos  61  -    PTT - ( 28 Aug 2020 01:16 )  PTT:35.9 sec

## 2020-08-29 NOTE — PROGRESS NOTE ADULT - PROBLEM SELECTOR PLAN 3
-Appr hem/onc recs  -Likely 2/2 active infxn  -C/w tx for bacteremia and c. diff, as below  -C/w trend CBC  -CMV PCR 8/26 negative

## 2020-08-29 NOTE — PROGRESS NOTE ADULT - PROBLEM SELECTOR PLAN 1
- statin for TCAD ppx on hold d/t ileus  -ASA on hold given GIB/ileus  - changed to IV cyclosporine given unclear GI absorption currently and possible need for surgery; level elevated; goal  (given infection)  -Steroid taper as written  -atovaquone and posaconazole for ppx; on hold d/t ileus  -Off Cellcept while on high dose steroids and now Cdiff infxn  -Would maintain net even for today  -Check BMP BID  - CMV negative 8/26

## 2020-08-30 LAB
ALBUMIN SERPL ELPH-MCNC: 2.4 G/DL — LOW (ref 3.3–5)
ALBUMIN SERPL ELPH-MCNC: 2.5 G/DL — LOW (ref 3.3–5)
ALBUMIN SERPL ELPH-MCNC: 2.6 G/DL — LOW (ref 3.3–5)
ALP SERPL-CCNC: 65 U/L — SIGNIFICANT CHANGE UP (ref 40–120)
ALP SERPL-CCNC: 72 U/L — SIGNIFICANT CHANGE UP (ref 40–120)
ALP SERPL-CCNC: 73 U/L — SIGNIFICANT CHANGE UP (ref 40–120)
ALT FLD-CCNC: 17 U/L — SIGNIFICANT CHANGE UP (ref 10–45)
ALT FLD-CCNC: 18 U/L — SIGNIFICANT CHANGE UP (ref 10–45)
ALT FLD-CCNC: 19 U/L — SIGNIFICANT CHANGE UP (ref 10–45)
ANION GAP SERPL CALC-SCNC: 13 MMOL/L — SIGNIFICANT CHANGE UP (ref 5–17)
ANION GAP SERPL CALC-SCNC: 13 MMOL/L — SIGNIFICANT CHANGE UP (ref 5–17)
ANION GAP SERPL CALC-SCNC: 17 MMOL/L — SIGNIFICANT CHANGE UP (ref 5–17)
AST SERPL-CCNC: 30 U/L — SIGNIFICANT CHANGE UP (ref 10–40)
AST SERPL-CCNC: 32 U/L — SIGNIFICANT CHANGE UP (ref 10–40)
AST SERPL-CCNC: 33 U/L — SIGNIFICANT CHANGE UP (ref 10–40)
BASE EXCESS BLDV CALC-SCNC: -3.5 MMOL/L — LOW (ref -2–2)
BASE EXCESS BLDV CALC-SCNC: -3.7 MMOL/L — LOW (ref -2–2)
BILIRUB SERPL-MCNC: 0.6 MG/DL — SIGNIFICANT CHANGE UP (ref 0.2–1.2)
BILIRUB SERPL-MCNC: 0.6 MG/DL — SIGNIFICANT CHANGE UP (ref 0.2–1.2)
BILIRUB SERPL-MCNC: 0.8 MG/DL — SIGNIFICANT CHANGE UP (ref 0.2–1.2)
BLD GP AB SCN SERPL QL: NEGATIVE — SIGNIFICANT CHANGE UP
BUN SERPL-MCNC: 85 MG/DL — HIGH (ref 7–23)
BUN SERPL-MCNC: 87 MG/DL — HIGH (ref 7–23)
BUN SERPL-MCNC: 93 MG/DL — HIGH (ref 7–23)
CA-I SERPL-SCNC: 1.1 MMOL/L — LOW (ref 1.12–1.3)
CA-I SERPL-SCNC: 1.11 MMOL/L — LOW (ref 1.12–1.3)
CALCIUM SERPL-MCNC: 7.8 MG/DL — LOW (ref 8.4–10.5)
CALCIUM SERPL-MCNC: 7.9 MG/DL — LOW (ref 8.4–10.5)
CALCIUM SERPL-MCNC: 8 MG/DL — LOW (ref 8.4–10.5)
CHLORIDE BLDV-SCNC: 106 MMOL/L — SIGNIFICANT CHANGE UP (ref 96–108)
CHLORIDE BLDV-SCNC: 108 MMOL/L — SIGNIFICANT CHANGE UP (ref 96–108)
CHLORIDE SERPL-SCNC: 102 MMOL/L — SIGNIFICANT CHANGE UP (ref 96–108)
CHLORIDE SERPL-SCNC: 103 MMOL/L — SIGNIFICANT CHANGE UP (ref 96–108)
CHLORIDE SERPL-SCNC: 103 MMOL/L — SIGNIFICANT CHANGE UP (ref 96–108)
CO2 BLDV-SCNC: 23 MMOL/L — SIGNIFICANT CHANGE UP (ref 22–30)
CO2 BLDV-SCNC: 23 MMOL/L — SIGNIFICANT CHANGE UP (ref 22–30)
CO2 SERPL-SCNC: 20 MMOL/L — LOW (ref 22–31)
CO2 SERPL-SCNC: 20 MMOL/L — LOW (ref 22–31)
CO2 SERPL-SCNC: 21 MMOL/L — LOW (ref 22–31)
CREAT SERPL-MCNC: 2.7 MG/DL — HIGH (ref 0.5–1.3)
CREAT SERPL-MCNC: 3.1 MG/DL — HIGH (ref 0.5–1.3)
CREAT SERPL-MCNC: 3.17 MG/DL — HIGH (ref 0.5–1.3)
CYCLOSPORINE SER-MCNC: 74 NG/ML — LOW (ref 150–400)
GAS PNL BLDV: 138 MMOL/L — SIGNIFICANT CHANGE UP (ref 135–145)
GAS PNL BLDV: 139 MMOL/L — SIGNIFICANT CHANGE UP (ref 135–145)
GAS PNL BLDV: SIGNIFICANT CHANGE UP
GLUCOSE BLDC GLUCOMTR-MCNC: 115 MG/DL — HIGH (ref 70–99)
GLUCOSE BLDC GLUCOMTR-MCNC: 133 MG/DL — HIGH (ref 70–99)
GLUCOSE BLDC GLUCOMTR-MCNC: 144 MG/DL — HIGH (ref 70–99)
GLUCOSE BLDV-MCNC: 130 MG/DL — HIGH (ref 70–99)
GLUCOSE BLDV-MCNC: 140 MG/DL — HIGH (ref 70–99)
GLUCOSE SERPL-MCNC: 125 MG/DL — HIGH (ref 70–99)
GLUCOSE SERPL-MCNC: 133 MG/DL — HIGH (ref 70–99)
GLUCOSE SERPL-MCNC: 148 MG/DL — HIGH (ref 70–99)
HCO3 BLDV-SCNC: 21 MMOL/L — SIGNIFICANT CHANGE UP (ref 21–29)
HCO3 BLDV-SCNC: 22 MMOL/L — SIGNIFICANT CHANGE UP (ref 21–29)
HCT VFR BLD CALC: 25.3 % — LOW (ref 39–50)
HCT VFR BLD CALC: 25.6 % — LOW (ref 39–50)
HCT VFR BLD CALC: 27.3 % — LOW (ref 39–50)
HCT VFR BLDA CALC: 26 % — LOW (ref 39–50)
HCT VFR BLDA CALC: 26 % — LOW (ref 39–50)
HGB BLD CALC-MCNC: 8.4 G/DL — LOW (ref 13–17)
HGB BLD CALC-MCNC: 8.4 G/DL — LOW (ref 13–17)
HGB BLD-MCNC: 8 G/DL — LOW (ref 13–17)
HGB BLD-MCNC: 8.2 G/DL — LOW (ref 13–17)
HGB BLD-MCNC: 8.7 G/DL — LOW (ref 13–17)
HOROWITZ INDEX BLDV+IHG-RTO: 32 — SIGNIFICANT CHANGE UP
HOROWITZ INDEX BLDV+IHG-RTO: 32 — SIGNIFICANT CHANGE UP
LACTATE BLDV-MCNC: 0.7 MMOL/L — SIGNIFICANT CHANGE UP (ref 0.7–2)
LACTATE BLDV-MCNC: 0.9 MMOL/L — SIGNIFICANT CHANGE UP (ref 0.7–2)
MAGNESIUM SERPL-MCNC: 3.6 MG/DL — HIGH (ref 1.6–2.6)
MAGNESIUM SERPL-MCNC: 3.6 MG/DL — HIGH (ref 1.6–2.6)
MCHC RBC-ENTMCNC: 29.2 PG — SIGNIFICANT CHANGE UP (ref 27–34)
MCHC RBC-ENTMCNC: 29.8 PG — SIGNIFICANT CHANGE UP (ref 27–34)
MCHC RBC-ENTMCNC: 29.8 PG — SIGNIFICANT CHANGE UP (ref 27–34)
MCHC RBC-ENTMCNC: 31.3 GM/DL — LOW (ref 32–36)
MCHC RBC-ENTMCNC: 31.9 GM/DL — LOW (ref 32–36)
MCHC RBC-ENTMCNC: 32.4 GM/DL — SIGNIFICANT CHANGE UP (ref 32–36)
MCV RBC AUTO: 92 FL — SIGNIFICANT CHANGE UP (ref 80–100)
MCV RBC AUTO: 93.4 FL — SIGNIFICANT CHANGE UP (ref 80–100)
MCV RBC AUTO: 93.5 FL — SIGNIFICANT CHANGE UP (ref 80–100)
NRBC # BLD: 0 /100 WBCS — SIGNIFICANT CHANGE UP (ref 0–0)
OTHER CELLS CSF MANUAL: 7 ML/DL — LOW (ref 18–23)
OTHER CELLS CSF MANUAL: 8 ML/DL — LOW (ref 18–23)
PCO2 BLDV: 42 MMHG — SIGNIFICANT CHANGE UP (ref 35–50)
PCO2 BLDV: 43 MMHG — SIGNIFICANT CHANGE UP (ref 35–50)
PH BLDV: 7.32 — LOW (ref 7.35–7.45)
PH BLDV: 7.33 — LOW (ref 7.35–7.45)
PHOSPHATE SERPL-MCNC: 5.1 MG/DL — HIGH (ref 2.5–4.5)
PHOSPHATE SERPL-MCNC: 5.6 MG/DL — HIGH (ref 2.5–4.5)
PLATELET # BLD AUTO: 105 K/UL — LOW (ref 150–400)
PLATELET # BLD AUTO: 107 K/UL — LOW (ref 150–400)
PLATELET # BLD AUTO: 112 K/UL — LOW (ref 150–400)
PO2 BLDV: 35 MMHG — SIGNIFICANT CHANGE UP (ref 25–45)
PO2 BLDV: 41 MMHG — SIGNIFICANT CHANGE UP (ref 25–45)
POTASSIUM BLDV-SCNC: 3.2 MMOL/L — LOW (ref 3.5–5.3)
POTASSIUM BLDV-SCNC: 3.6 MMOL/L — SIGNIFICANT CHANGE UP (ref 3.5–5.3)
POTASSIUM SERPL-MCNC: 3.3 MMOL/L — LOW (ref 3.5–5.3)
POTASSIUM SERPL-MCNC: 3.4 MMOL/L — LOW (ref 3.5–5.3)
POTASSIUM SERPL-MCNC: 3.5 MMOL/L — SIGNIFICANT CHANGE UP (ref 3.5–5.3)
POTASSIUM SERPL-SCNC: 3.3 MMOL/L — LOW (ref 3.5–5.3)
POTASSIUM SERPL-SCNC: 3.4 MMOL/L — LOW (ref 3.5–5.3)
POTASSIUM SERPL-SCNC: 3.5 MMOL/L — SIGNIFICANT CHANGE UP (ref 3.5–5.3)
PROT SERPL-MCNC: 5.5 G/DL — LOW (ref 6–8.3)
PROT SERPL-MCNC: 6.6 G/DL — SIGNIFICANT CHANGE UP (ref 6–8.3)
PROT SERPL-MCNC: 6.6 G/DL — SIGNIFICANT CHANGE UP (ref 6–8.3)
RBC # BLD: 2.74 M/UL — LOW (ref 4.2–5.8)
RBC # BLD: 2.75 M/UL — LOW (ref 4.2–5.8)
RBC # BLD: 2.92 M/UL — LOW (ref 4.2–5.8)
RBC # FLD: 19.3 % — HIGH (ref 10.3–14.5)
RBC # FLD: 19.5 % — HIGH (ref 10.3–14.5)
RBC # FLD: 19.6 % — HIGH (ref 10.3–14.5)
RH IG SCN BLD-IMP: POSITIVE — SIGNIFICANT CHANGE UP
SAO2 % BLDV: 60 % — LOW (ref 67–88)
SAO2 % BLDV: 70 % — SIGNIFICANT CHANGE UP (ref 67–88)
SODIUM SERPL-SCNC: 136 MMOL/L — SIGNIFICANT CHANGE UP (ref 135–145)
SODIUM SERPL-SCNC: 137 MMOL/L — SIGNIFICANT CHANGE UP (ref 135–145)
SODIUM SERPL-SCNC: 139 MMOL/L — SIGNIFICANT CHANGE UP (ref 135–145)
WBC # BLD: 7.22 K/UL — SIGNIFICANT CHANGE UP (ref 3.8–10.5)
WBC # BLD: 7.35 K/UL — SIGNIFICANT CHANGE UP (ref 3.8–10.5)
WBC # BLD: 8.05 K/UL — SIGNIFICANT CHANGE UP (ref 3.8–10.5)
WBC # FLD AUTO: 7.22 K/UL — SIGNIFICANT CHANGE UP (ref 3.8–10.5)
WBC # FLD AUTO: 7.35 K/UL — SIGNIFICANT CHANGE UP (ref 3.8–10.5)
WBC # FLD AUTO: 8.05 K/UL — SIGNIFICANT CHANGE UP (ref 3.8–10.5)

## 2020-08-30 PROCEDURE — 99291 CRITICAL CARE FIRST HOUR: CPT

## 2020-08-30 PROCEDURE — 99233 SBSQ HOSP IP/OBS HIGH 50: CPT | Mod: GC

## 2020-08-30 PROCEDURE — 74176 CT ABD & PELVIS W/O CONTRAST: CPT | Mod: 26

## 2020-08-30 PROCEDURE — 71045 X-RAY EXAM CHEST 1 VIEW: CPT | Mod: 26

## 2020-08-30 PROCEDURE — 99232 SBSQ HOSP IP/OBS MODERATE 35: CPT

## 2020-08-30 RX ORDER — CYCLOSPORINE 100 MG/1
30 CAPSULE ORAL ONCE
Refills: 0 | Status: COMPLETED | OUTPATIENT
Start: 2020-08-30 | End: 2020-08-30

## 2020-08-30 RX ORDER — VANCOMYCIN HCL 1 G
500 VIAL (EA) INTRAVENOUS EVERY 6 HOURS
Refills: 0 | Status: DISCONTINUED | OUTPATIENT
Start: 2020-08-30 | End: 2020-09-11

## 2020-08-30 RX ORDER — INSULIN LISPRO 100/ML
VIAL (ML) SUBCUTANEOUS EVERY 8 HOURS
Refills: 0 | Status: DISCONTINUED | OUTPATIENT
Start: 2020-08-30 | End: 2020-09-03

## 2020-08-30 RX ORDER — CYCLOSPORINE 100 MG/1
30 CAPSULE ORAL EVERY 24 HOURS
Refills: 0 | Status: DISCONTINUED | OUTPATIENT
Start: 2020-08-30 | End: 2020-08-31

## 2020-08-30 RX ADMIN — Medication 16 MILLIGRAM(S): at 09:37

## 2020-08-30 RX ADMIN — Medication 100 MILLIGRAM(S): at 17:54

## 2020-08-30 RX ADMIN — Medication 500 MILLIGRAM(S): at 17:12

## 2020-08-30 RX ADMIN — CYCLOSPORINE 2.11 MILLIGRAM(S): 100 CAPSULE ORAL at 08:47

## 2020-08-30 RX ADMIN — Medication 500 MILLIGRAM(S): at 15:42

## 2020-08-30 RX ADMIN — SODIUM CHLORIDE 3 MILLILITER(S): 9 INJECTION INTRAMUSCULAR; INTRAVENOUS; SUBCUTANEOUS at 05:37

## 2020-08-30 RX ADMIN — PANTOPRAZOLE SODIUM 40 MILLIGRAM(S): 20 TABLET, DELAYED RELEASE ORAL at 17:54

## 2020-08-30 RX ADMIN — Medication 500 MILLIGRAM(S): at 01:28

## 2020-08-30 RX ADMIN — IMMUNE GLOBULIN (HUMAN) 75 GRAM(S): 10 INJECTION INTRAVENOUS; SUBCUTANEOUS at 09:00

## 2020-08-30 RX ADMIN — HEPARIN SODIUM 5000 UNIT(S): 5000 INJECTION INTRAVENOUS; SUBCUTANEOUS at 21:00

## 2020-08-30 RX ADMIN — HEPARIN SODIUM 5000 UNIT(S): 5000 INJECTION INTRAVENOUS; SUBCUTANEOUS at 15:41

## 2020-08-30 RX ADMIN — Medication 100 MILLIGRAM(S): at 08:46

## 2020-08-30 RX ADMIN — Medication 500 MILLIGRAM(S): at 19:59

## 2020-08-30 RX ADMIN — Medication 500 MILLIGRAM(S): at 08:46

## 2020-08-30 RX ADMIN — HEPARIN SODIUM 5000 UNIT(S): 5000 INJECTION INTRAVENOUS; SUBCUTANEOUS at 05:38

## 2020-08-30 RX ADMIN — PANTOPRAZOLE SODIUM 40 MILLIGRAM(S): 20 TABLET, DELAYED RELEASE ORAL at 05:38

## 2020-08-30 RX ADMIN — CHLORHEXIDINE GLUCONATE 1 APPLICATION(S): 213 SOLUTION TOPICAL at 05:38

## 2020-08-30 RX ADMIN — Medication 100 MILLIGRAM(S): at 01:27

## 2020-08-30 NOTE — CHART NOTE - NSCHARTNOTEFT_GEN_A_CORE
Patient was seen and examined at bedside    gen: visibly uncomfortable with NGT Patient was seen and examined at bedside    gen: visibly uncomfortable with NGT  Abd: tensely distended, tender to palpation, tympanic Patient was seen and examined at bedside    Gen: visibly uncomfortable with NGT  Abd: tensely distended, tender to palpation, tympanic Patient was seen and examined at bedside    Gen: visibly uncomfortable with NGT  Abd: tensely distended, non-tender to palpation, tympanic, hypoactive bowel sounds    0444:   Patient seen and examined at bedside with Dr. De Los Santos.     Gen: resting comfortably with NGT  abd: distended, non-tender to palpation, tympanic Patient was seen and examined at bedside    Gen: visibly uncomfortable with NGT  Abd: tensely distended, non-tender to palpation, tympanic, hypoactive bowel sounds    0444:   Patient seen and examined at bedside with Dr. De Los Santos.     Vital Signs Last 24 Hrs  T(C): 36.5 (31 Aug 2020 04:00), Max: 36.6 (31 Aug 2020 00:00)  T(F): 97.7 (31 Aug 2020 04:00), Max: 97.9 (31 Aug 2020 00:00)  HR: 108 (31 Aug 2020 04:00) (104 - 113)  BP: 108/64 (31 Aug 2020 04:00) (85/56 - 115/69)  BP(mean): 82 (31 Aug 2020 04:00) (66 - 90)  RR: 16 (31 Aug 2020 04:00) (9 - 30)  SpO2: 95% (31 Aug 2020 04:00) (94% - 98%)    I&O's Detail    29 Aug 2020 07:01  -  30 Aug 2020 07:00  --------------------------------------------------------  IN:    Albumin 5%  - 250 mL: 250 mL    Enteral Tube Flush: 180 mL    multiple electrolytes Injection Type 1: 1020 mL    Solution: 300 mL    Solution: 150 mL    Solution: 8.7 mL  Total IN: 1908.7 mL    OUT:    Indwelling Catheter - Urethral: 855 mL  Total OUT: 855 mL    Total NET: 1053.7 mL      30 Aug 2020 07:01  -  31 Aug 2020 05:13  --------------------------------------------------------  IN:    Enteral Tube Flush: 40 mL    multiple electrolytes Injection Type 1: 2320 mL    Solution: 200 mL    Solution: 46.2 mL    Solution: 600 mL    Solution: 260 mL  Total IN: 3466.2 mL    OUT:    Indwelling Catheter - Urethral: 1425 mL    Nasoenteral Tube: 50 mL  Total OUT: 1475 mL    Total NET: 1991.2 mL        Exam:  Gen: resting comfortably with NGT  abd: distended, non-tender to palpation, tympanic      LABS:                          8.2    8.05  )-----------( 107      ( 30 Aug 2020 22:12 )             25.3     08-30    137  |  103  |  87<H>  ----------------------------<  133<H>  3.3<L>   |  21<L>  |  2.70<H>    Ca    7.8<L>      30 Aug 2020 22:12  Phos  5.1     08-30  Mg     3.6     08-30    TPro  6.6  /  Alb  2.4<L>  /  TBili  0.6  /  DBili  x   /  AST  33  /  ALT  17  /  AlkPhos  73  08-30

## 2020-08-30 NOTE — PROGRESS NOTE ADULT - SUBJECTIVE AND OBJECTIVE BOX
SUBJECTIVE:  Surgery called to bedside for concern of worsening physical exam.  Patient states still having flatus this morning, but has not had a BM in 24 hours.  He was started on IVIG this AM and ordered for rectal vancomycin.    Vital Signs Last 24 Hrs  T(C): 36.1 (30 Aug 2020 13:00), Max: 36.1 (30 Aug 2020 11:00)  T(F): 97 (30 Aug 2020 13:00), Max: 97 (30 Aug 2020 13:00)  HR: 106 (30 Aug 2020 13:00) (101 - 110)  BP: 102/65 (30 Aug 2020 13:00) (85/56 - 113/58)  BP(mean): 78 (30 Aug 2020 13:00) (66 - 84)  RR: 10 (30 Aug 2020 13:00) (9 - 21)  SpO2: 96% (30 Aug 2020 13:00) (96% - 99%)    I&O's Detail    29 Aug 2020 07:01  -  30 Aug 2020 07:00  --------------------------------------------------------  IN:    Albumin 5%  - 250 mL: 250 mL    Enteral Tube Flush: 180 mL    multiple electrolytes Injection Type 1: 1020 mL    Solution: 300 mL    Solution: 150 mL    Solution: 8.7 mL  Total IN: 1908.7 mL    OUT:    Indwelling Catheter - Urethral: 855 mL  Total OUT: 855 mL    Total NET: 1053.7 mL      30 Aug 2020 07:01  -  30 Aug 2020 14:01  --------------------------------------------------------  IN:    multiple electrolytes Injection Type 1: 400 mL    Solution: 100 mL    Solution: 360 mL    Solution: 12.6 mL  Total IN: 872.6 mL    OUT:    Indwelling Catheter - Urethral: 255 mL  Total OUT: 255 mL    Total NET: 617.6 mL          Physical Exam:  General Appearance: Not in acute distress  Respiratory: no accessory muscle use  Abdomen: Soft, but distended.  Nontender  Extremities: Grossly symmetric    LABS:                        8.7    7.22  )-----------( 112      ( 30 Aug 2020 01:19 )             27.3     08-30    139  |  102  |  85<H>  ----------------------------<  125<H>  3.4<L>   |  20<L>  |  3.17<H>    Ca    7.9<L>      30 Aug 2020 01:19  Phos  5.6     08-30  Mg     3.6     08-30    TPro  5.5<L>  /  Alb  2.6<L>  /  TBili  0.8  /  DBili  x   /  AST  32  /  ALT  18  /  AlkPhos  65  08-30          RADIOLOGY & ADDITIONAL STUDIES:

## 2020-08-30 NOTE — PROGRESS NOTE ADULT - PROBLEM SELECTOR PLAN 1
- statin for TCAD ppx on hold d/t ileus  -ASA on hold given GIB/ileus  - changed to IV cyclosporine given unclear GI absorption currently and possible need for surgery; goal  (given infection)  -Steroid taper as written  -atovaquone and posaconazole for ppx; on hold d/t ileus  -Off Cellcept while on high dose steroids and now Cdiff infxn  -Would maintain net even for today  -Check BMP BID  - CMV negative 8/26

## 2020-08-30 NOTE — PROGRESS NOTE ADULT - ASSESSMENT
Patient is a 70y M with history of heart transplant in 2/2018 admitted with autoimmune hemolytic anemia and dark stools requiring transfusion.  Patient found to have c. diff colitis and NORMAN.    Plan:  - CT from 8/28 shows thickening of rectal mucosa likely 2/2 c. diff colitis   - Please obtain CT A/P to evaluate for megacolon  - Start rectal vancomycin.  - Repeat set of labs including lactate, cbc, bmp.  - serial abdominal exams  - will follow closely  - Discussed with attending    Tao Elliott PGY4  [facility] #[id number]

## 2020-08-30 NOTE — PROGRESS NOTE ADULT - ASSESSMENT
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 He has received a total of 3 units PRBC this admission with slow downtrend of hemoglobin. Labs were not consistent with hemolysis and he reported dark stools for which EGD/enteroscopy eventually revealed chronic gastritis and small bowel ectasias. He developed acute respiratory distress and profound sinus tachycardia on 8/13 in setting of Klebsiella bacteremia of unclear origin (preceded EGD) for which CT performed which suggests possible pneumonia. He has clinically improved with antibiotics, however he was found to have worsening abdominal distention and Cdiff infxn and was started on vanc PO as well as IV flagyl. Also notably has anasarca with RUE swelling and b/l LE swelling; RHC on 8/25 confirmed elev filling pressures, however at bedside CVP 8-10. Developed oliguric renal failure since 8/22 possibly 2/2 relative hypotension, improving. Has worsening abdominal distention with finding of ileus. CT scan with pancolitis. Started on IVIG 8/29, rectal vanc and Eravacylcine on 8/30    Review of pertinent studies  8/2020 labs: Direct Jacky +, 4.6% reticulocytes with low RI, normal bilirubin,  (stable). Haptoglobin normal.   8/25 RHC: RA 13, RV 48/14, PA 49/23/32, PCWP 20, Marino CI/CO 4.2/8.1. MAP 83.

## 2020-08-30 NOTE — PROGRESS NOTE ADULT - SUBJECTIVE AND OBJECTIVE BOX
Interval History:  lethargic  reportedly no BM in 24 hours    Medications:  benzocaine 15 mG/menthol 3.6 mG (Sugar-Free) Lozenge 1 Lozenge Oral every 6 hours PRN  chlorhexidine 2% Cloths 1 Application(s) Topical <User Schedule>  cycloSPORINE  (SandIMMUNE) IVPB 30 milliGRAM(s) IV Intermittent every 24 hours  Eravacycline (Xerava) 80 milliGRAM(s),Sodium Chloride 0.9% 160 milliLiter(s) 80 milliGRAM(s) IV Intermittent once  heparin   Injectable 5000 Unit(s) SubCutaneous every 8 hours  immune   globulin 10% (GAMMAGARD) IVPB 60 Gram(s) IV Intermittent every 24 hours  insulin lispro (HumaLOG) corrective regimen sliding scale   SubCutaneous every 8 hours  methylPREDNISolone sodium succinate Injectable 16 milliGRAM(s) IV Push <User Schedule>  metroNIDAZOLE  IVPB      metroNIDAZOLE  IVPB 500 milliGRAM(s) IV Intermittent every 8 hours  multiple electrolytes Injection Type 1 1000 milliLiter(s) IV Continuous <Continuous>  pantoprazole  Injectable 40 milliGRAM(s) IV Push two times a day  vancomycin    Solution 500 milliGRAM(s) Oral every 6 hours  vancomycin  Retention Enema 500 milliGRAM(s) Rectal every 6 hours      Vitals:  T(C): 36.4 (20 @ 15:00), Max: 36.5 (20 @ 13:30)  HR: 107 (20 @ 15:00) (101 - 110)  BP: 111/72 (20 @ 15:00) (85/56 - 113/58)  BP(mean): 87 (20 @ 15:00) (66 - 87)  RR: 12 (20 @ 15:00) (9 - 27)  SpO2: 97% (20 @ 15:00) (94% - 99%)    Daily     Daily Weight in k.6 (30 Aug 2020 01:00)        I&O's Summary    29 Aug 2020 07:01  -  30 Aug 2020 07:00  --------------------------------------------------------  IN: 1908.7 mL / OUT: 855 mL / NET: 1053.7 mL    30 Aug 2020 07:01  -  30 Aug 2020 16:29  --------------------------------------------------------  IN: 1276.8 mL / OUT: 335 mL / NET: 941.8 mL    Physical Exam:  Appearance: Critically ill  HEENT: PERRL  Neck: JVD approx 8-10  Cardiovascular: Normal S1 S2, No murmurs/rubs/gallops  Respiratory: Clear to auscultation bilaterally  Gastrointestinal: distended; tympanic; absent BS; nontender	  Skin: No cyanosis	  Neurologic: Non-focal  Extremities: b/l LE edema and UE edema  Psychiatry: A & O x 3, Mood & affect appropriate    Labs:                        8.0    7.35  )-----------( 105      ( 30 Aug 2020 16:13 )             25.6         139  |  102  |  85<H>  ----------------------------<  125<H>  3.4<L>   |  20<L>  |  3.17<H>    Ca    7.9<L>      30 Aug 2020 01:19  Phos  5.6       Mg     3.6         TPro  5.5<L>  /  Alb  2.6<L>  /  TBili  0.8  /  DBili  x   /  AST  32  /  ALT  18  /  AlkPhos  65

## 2020-08-30 NOTE — PROGRESS NOTE ADULT - ASSESSMENT
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 He has responded appropriately to a single blood transfusion. Of note, he recently has developed dark colored stools. Will investigate if anemia is due to GI bleeding in setting of steroid use or hemolysis and manage appropriately. Underwent UGI without bleeding source identified. Developed Klebsiella oxytoca bacteremia requiring transfer to CTU. Clinically improving, transferred to [facility], finished 10-day of ceftriaxone, however, developed severe C.diff colitis on Vancomycin 500mg q6h PO and IV Flagyl. He had RHC on 8/25 and found to have high filling pressure so transferred to CTU again.    #C.diff PCR detected (8/23): severe C.diff colitis  appears clinically progressed compared with yesterday, progressing towars toxic megacolon  IVIG has been initiated this am    SUGGEST  Rectal tube: Vancomycin 500 mg per rectum every 6 hours  continue enteral Vanco 500 q 6h plus IV Flagyl 500 every 8 hours  Add ERAVAYCLINE 1 mg/kg (750 mg)  every 12 hours  Surgical consult: Consider cecostomy for colonic vancomycin instillation or colectomy    #Klebsiella oxytoca bacteremia:  Meropenem (8/14-8/17)  Cefepime (8/17-20)  Ceftriaxone (8/20-)  - Growing in blood cultures 2/2 sets on 8/13  - Unclear source  - Repeat blood cultures x2 sets 8/14 no growth final  - s/p ceftriaxone until Monday 8/24 to finish a 10-day course    #OI prophylaxis-  -has been receiving high dose steroids since 5/20  -On IV cyclosproine now  -CMV viral load(8/17, 8/26): neg  -Valcyte and Bactrim d/c'ed on 8/17 due to leukopenia  -Galactomann<0.5, Fungitell<31  -Per hemoc, will taper prednisone every 7 days by 10mg  -Will consider IV bactrim for PCP ppx next week    #Pancytopenia- on admission  -Remains with significant leukopenia- especially lymphocyte lines  -COVID PCR neg  -Tick panel neg    #Anemia- r/o GI bleeding    EGD 8/17: esophagitis, acute gastritis s/p biopsy, no ulceration  On PPI 40mg daily    #anasarca/protein malnutrition    discussed with CTU attending

## 2020-08-30 NOTE — PROGRESS NOTE ADULT - SUBJECTIVE AND OBJECTIVE BOX
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 Patient was treated w/ prednisone and Rituximab. Tacro was discontinued and patient was also transitioned to everolimus  Admitted  6/16-6/19  for hyperglycemia.  Reported to transplant team having one done black tarry stool-  instructed to  present to [facility] for hemolytic anemia. Patient has been following with Hematology outpatient.  Denies CP  N/V diarrhea SOB. (11 Aug 2020 20:08)      Surgery/Hospital Course:  8/11 Admitted to [facility] with symptomatic anemia  8/13 EGD for investigation of tarry stools  8/15 SB capsule: stomach & SB lesions, likely ulcers.  8/17 EGD/push enteroscopy w/ angioectasias/erosions in small bowel. Gastropathy and esophagitis. Ulcer seen on VCE most likely scope trauma.    8/18 VSS transferred back to floor.   8/20 VS 9.0/28.4 stable Gastric biopsy results LA Grade A esophagitis.  - Acute gastritis. Biopsies taken to r/o CMV, No ulceration seen despite finding on capsule endoscopy - likely 2/2 scope  trauma that has since resolved. Pathology pending.  8/21 VSS H/H  8.1/25.7  trending down will monitor prednisone taper 30 mg today. Procardia 120 initiated today RHC biopsy Monday.   8/22 VVS; Patient reports loose stools x 2-3 days with 1 episode today.  Stool sent for C-dif and GI PCR. Abdominal X-ray revealed: dilated loops of bowel. Abdomen distended.  Patient denies N/V. GI called to re-evaluate. Continue with current medication regimen.  Awaiting for upcoming cardiac biopsy on Monday 8/24.  8/23   vss    creat up to 2.28 ,  repeating CMP,  TTE ordered  Bladder scan   8/24   cardiac bx cancelled   elev creat  to 2.74   cardio renal cslt called,    + CDIF   inc vanco to 500 q6   ID following  8/25 VSS: RHC today as per transplant team; transfuse 2 units PRBC today as per Dr. Viramontes; continue vanco for cdiff; transfer patient to CTU after cath today   8/26. Dieretic challenge with good response   8/27: Downgraded to the floor then upgraded to the CTU again for concerning of abd distention   8/28 CT ABD & Pelvis reveals pancolitis    Today:    REVIEW OF SYSTEMS:  Gen: No fever  EYES/ENT: No visual changes;  No vertigo or throat pain   NECK: No pain   RES:  No shortness of breath or Cough  CARD: No chest pain   GI: No abdominal pain  : No dysuria  NEURO: No weakness  SKIN: No itching, rashes     ICU Vital Signs Last 24 Hrs  T(C): 35.8 (30 Aug 2020 09:15), Max: 35.9 (30 Aug 2020 04:00)  T(F): 96.5 (30 Aug 2020 09:15), Max: 96.6 (30 Aug 2020 04:00)  HR: 105 (30 Aug 2020 09:30) (101 - 117)  BP: 101/62 (30 Aug 2020 09:30) (85/56 - 113/58)  BP(mean): 76 (30 Aug 2020 09:30) (66 - 84)  ABP: --  ABP(mean): --  RR: 9 (30 Aug 2020 09:30) (9 - 21)  SpO2: 96% (30 Aug 2020 09:30) (96% - 100%)      Physical Exam:  Gen:  Awake, alert   CNS: non focal 	  Neck: no JVD  RES : clear , no wheezing              CVS: Sinus Tachycardia. Normal S1/S2  Abd: Soft, non-tender, distended. Bowel sounds present. +NGT to LWS  Skin: No rash.  Ext:  +1 pedal edema    ============================I/O===========================   I&O's Detail    29 Aug 2020 07:01  -  30 Aug 2020 07:00  --------------------------------------------------------  IN:    Albumin 5%  - 250 mL: 250 mL    Enteral Tube Flush: 180 mL    multiple electrolytes Injection Type 1: 1020 mL    Solution: 300 mL    Solution: 150 mL    Solution: 8.7 mL  Total IN: 1908.7 mL    OUT:    Indwelling Catheter - Urethral: 855 mL  Total OUT: 855 mL    Total NET: 1053.7 mL      30 Aug 2020 07:01  -  30 Aug 2020 09:38  --------------------------------------------------------  IN:    multiple electrolytes Injection Type 1: 100 mL    Solution: 40 mL    Solution: 100 mL    Solution: 2.1 mL  Total IN: 242.1 mL    OUT:    Indwelling Catheter - Urethral: 95 mL  Total OUT: 95 mL    Total NET: 147.1 mL        ============================ LABS =========================                        8.7    7.22  )-----------( 112      ( 30 Aug 2020 01:19 )             27.3     08-30    139  |  102  |  85<H>  ----------------------------<  125<H>  3.4<L>   |  20<L>  |  3.17<H>    Ca    7.9<L>      30 Aug 2020 01:19  Phos  5.6     08-30  Mg     3.6     08-30    TPro  5.5<L>  /  Alb  2.6<L>  /  TBili  0.8  /  DBili  x   /  AST  32  /  ALT  18  /  AlkPhos  65  08-30    LIVER FUNCTIONS - ( 30 Aug 2020 01:19 )  Alb: 2.6 g/dL / Pro: 5.5 g/dL / ALK PHOS: 65 U/L / ALT: 18 U/L / AST: 32 U/L / GGT: x                 ======================Micro/Rad/Cardio=================  Culture: Reviewed   CXR: Reviewed  Echo:Reviewed  ======================================================  PAST MEDICAL & SURGICAL HISTORY:  H/O hemolytic anemia  H/O autoimmune hemolytic anemia  Knee pain, right  HLD (hyperlipidemia)  Former smoker  DVT of upper extremity (deep vein thrombosis)  Hepatitis C virus  GIB (gastrointestinal bleeding)  Ventricular fibrillation: s/p AICD  PAF (paroxysmal atrial fibrillation): on xarelto  Non-Ischemic Cardiomyopathy  SVT (Supraventricular Tachycardia)  HTN  CHF (Congestive Heart Failure)  S/P right heart catheterization: biopsy multiple  H/O heart transplant: 2/2018  Status post left hip replacement  History of Prior Ablation Treatment: for afib  AICD (Automatic Cardioverter/Defibrillator) Present: [employer] with 1 [employer] lead4/1/09- explanted and replaced with Medtronic 2 leads on 9/2/09    ====================ASSESSMENT ==============  Heart transplant 2/2018 for ACC/AHA stage D NICM   Resolved GI Bleed, Melena s/p EGD  Acute respiratory failure, resolved  Supraventricular tachycardia  Severe hypertension  Hyperlactatemia acidosis, resolved  Leukopenia- resolved  Acute blood loss anemia, stable   CKD Stage III  C. diff diarrhea  Klebsiella oxytoca bacteremia  Sepsis  Azotemia 2/2 CKD and Steroids   Pancolitis      Plan:  ====================== NEUROLOGY=====================  Nonfocal, Continue to monitor neurological status as per ICU protocol.   Continue physical therapy, out of bed to chair as tolerated    ==================== RESPIRATORY======================  Resolved acute respiratory failure, continues on 3L nasal cannula   Encourage incentive spirometry, continue pulse ox monitoring, follow ABGs     ====================CARDIOVASCULAR==================  Heart transplant 2/2018 for ACC/AHA stage D NICM, now on solumedrol from prednisone   (NPO for abd distention) and everolimus. off cellcept while on high dose steroids   Continue hemodynamic monitoring.  Not on any pressors.  ASA on hold given GI bleed    methylPREDNISolone sodium succinate Injectable 16 milliGRAM(s) IV Push <User Schedule>    ===================HEMATOLOGIC/ONC ===================  Anemia s/p multiple blood transfusions  Monitor H&H and transfuse prn     DVT prophylaxis, heparin   Injectable 5000 Unit(s) SubCutaneous every 8 hours    ===================== RENAL =========================  NORMAN on CKD stage 3, Anasarca. lasix prn   Continue monitoring urine output via diaz, I&Os, BUN/Cr  Continue Electrolyte gtt and monitor lytes.     multiple electrolytes Injection Type 1 1000 milliLiter(s) (40 mL/Hr) IV Continuous <Continuous>  ==================== GASTROINTESTINAL===================  NPO in setting of abd distention, NGT to LWS  Monitor abd distention,  no peritonitis.   Follow up CT ABD & Pelvis on 8/28 reveals pancolitis  Gen surgery following  Resolved GI bleed s/p EGD     GI prophylaxis, pantoprazole  Injectable 40 milliGRAM(s) IV Push two times a day    =======================    ENDOCRINE  =====================  Stress hyperglycemia requiring glucose control with humalog sliding scale    insulin lispro (HumaLOG) corrective regimen sliding scale   SubCutaneous every 8 hours    ========================INFECTIOUS DISEASE================  Klebsiella bacteremia from 8/13 which has since cleared  Clostridium difficile PCR positive, continue with vancomycin and metronidazole   Transplant prophylaxis with Posaconazole and Atovaquone on hold d/t ileus  Monitor team, WBC within normal limits at 7 from 4 8/26 CMV PCR Negative    metroNIDAZOLE  IVPB 500 milliGRAM(s) IV Intermittent every 8 hours  vancomycin    Solution 500 milliGRAM(s) Oral every 6 hours      Patient requires continuous monitoring with bedside rhythm monitoring, pulse ox monitoring, and intermittent blood gas analysis. Care plan discussed with ICU care team. Patient remained critical and required more than usual post op care.    By signing my name below, I, Breanna Newell, attest that this documentation has been prepared under the direction and in the presence of SHELLY Goode   Electronically signed: Katty Ramírez, 08-30-20 @ 09:38    I, Li Ohara , personally performed the services described in this documentation. all medical record entries made by the ramuibe were at my direction and in my presence. 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 Patient was treated w/ prednisone and Rituximab. Tacro was discontinued and patient was also transitioned to everolimus  Admitted  6/16-6/19  for hyperglycemia.  Reported to transplant team having one done black tarry stool-  instructed to  present to [facility] for hemolytic anemia. Patient has been following with Hematology outpatient.  Denies CP  N/V diarrhea SOB. (11 Aug 2020 20:08)      Surgery/Hospital Course:  8/11 Admitted to [facility] with symptomatic anemia  8/13 EGD for investigation of tarry stools  8/15 SB capsule: stomach & SB lesions, likely ulcers.  8/17 EGD/push enteroscopy w/ angioectasias/erosions in small bowel. Gastropathy and esophagitis. Ulcer seen on VCE most likely scope trauma.    8/18 VSS transferred back to floor.   8/20 VS 9.0/28.4 stable Gastric biopsy results LA Grade A esophagitis.  - Acute gastritis. Biopsies taken to r/o CMV, No ulceration seen despite finding on capsule endoscopy - likely 2/2 scope  trauma that has since resolved. Pathology pending.  8/21 VSS H/H  8.1/25.7  trending down will monitor prednisone taper 30 mg today. Procardia 120 initiated today RHC biopsy Monday.   8/22 VVS; Patient reports loose stools x 2-3 days with 1 episode today.  Stool sent for C-dif and GI PCR. Abdominal X-ray revealed: dilated loops of bowel. Abdomen distended.  Patient denies N/V. GI called to re-evaluate. Continue with current medication regimen.  Awaiting for upcoming cardiac biopsy on Monday 8/24.  8/23   vss    creat up to 2.28 ,  repeating CMP,  TTE ordered  Bladder scan   8/24   cardiac bx cancelled   elev creat  to 2.74   cardio renal cslt called,    + CDIF   inc vanco to 500 q6   ID following  8/25 VSS: RHC today as per transplant team; transfuse 2 units PRBC today as per Dr. Viramontes; continue vanco for cdiff; transfer patient to CTU after cath today   8/26. Dieretic challenge with good response   8/27: Downgraded to the floor then upgraded to the CTU again for concerning of abd distention   8/28 CT ABD & Pelvis reveals pancolitis  8/30: repeat CT scan of abd, gen surgery called to re-evaluated pt. pt a little more lethargic today but A&Ox3     Today:  repeat CT SCAN of ABD   started on vacno rectal and ervaycline per ID     REVIEW OF SYSTEMS:  Gen: No fever  EYES/ENT: No visual changes;  No vertigo or throat pain   NECK: No pain   RES:  No shortness of breath or Cough  CARD: No chest pain   GI: No abdominal pain  : No dysuria  NEURO: No weakness  SKIN: No itching, rashes     ICU Vital Signs Last 24 Hrs  T(C): 35.8 (30 Aug 2020 09:15), Max: 35.9 (30 Aug 2020 04:00)  T(F): 96.5 (30 Aug 2020 09:15), Max: 96.6 (30 Aug 2020 04:00)  HR: 105 (30 Aug 2020 09:30) (101 - 117)  BP: 101/62 (30 Aug 2020 09:30) (85/56 - 113/58)  BP(mean): 76 (30 Aug 2020 09:30) (66 - 84)  ABP: --  ABP(mean): --  RR: 9 (30 Aug 2020 09:30) (9 - 21)  SpO2: 96% (30 Aug 2020 09:30) (96% - 100%)      Physical Exam:  Gen:  Awake, alert   CNS: non focal 	  Neck: no JVD  RES : clear , no wheezing              CVS: Sinus Tachycardia. Normal S1/S2  Abd: firm, non-tender, distended. Bowel sounds absent. +NGT to LWS  Skin: No rash.  Ext:  +1 pedal edema    ============================I/O===========================   I&O's Detail    29 Aug 2020 07:01  -  30 Aug 2020 07:00  --------------------------------------------------------  IN:    Albumin 5%  - 250 mL: 250 mL    Enteral Tube Flush: 180 mL    multiple electrolytes Injection Type 1: 1020 mL    Solution: 300 mL    Solution: 150 mL    Solution: 8.7 mL  Total IN: 1908.7 mL    OUT:    Indwelling Catheter - Urethral: 855 mL  Total OUT: 855 mL    Total NET: 1053.7 mL      30 Aug 2020 07:01  -  30 Aug 2020 09:38  --------------------------------------------------------  IN:    multiple electrolytes Injection Type 1: 100 mL    Solution: 40 mL    Solution: 100 mL    Solution: 2.1 mL  Total IN: 242.1 mL    OUT:    Indwelling Catheter - Urethral: 95 mL  Total OUT: 95 mL    Total NET: 147.1 mL        ============================ LABS =========================                        8.7    7.22  )-----------( 112      ( 30 Aug 2020 01:19 )             27.3     08-30    139  |  102  |  85<H>  ----------------------------<  125<H>  3.4<L>   |  20<L>  |  3.17<H>    Ca    7.9<L>      30 Aug 2020 01:19  Phos  5.6     08-30  Mg     3.6     08-30    TPro  5.5<L>  /  Alb  2.6<L>  /  TBili  0.8  /  DBili  x   /  AST  32  /  ALT  18  /  AlkPhos  65  08-30    LIVER FUNCTIONS - ( 30 Aug 2020 01:19 )  Alb: 2.6 g/dL / Pro: 5.5 g/dL / ALK PHOS: 65 U/L / ALT: 18 U/L / AST: 32 U/L / GGT: x                 ======================Micro/Rad/Cardio=================  Culture: Reviewed   CXR: Reviewed  Echo:Reviewed  ======================================================  PAST MEDICAL & SURGICAL HISTORY:  H/O hemolytic anemia  H/O autoimmune hemolytic anemia  Knee pain, right  HLD (hyperlipidemia)  Former smoker  DVT of upper extremity (deep vein thrombosis)  Hepatitis C virus  GIB (gastrointestinal bleeding)  Ventricular fibrillation: s/p AICD  PAF (paroxysmal atrial fibrillation): on xarelto  Non-Ischemic Cardiomyopathy  SVT (Supraventricular Tachycardia)  HTN  CHF (Congestive Heart Failure)  S/P right heart catheterization: biopsy multiple  H/O heart transplant: 2/2018  Status post left hip replacement  History of Prior Ablation Treatment: for afib  AICD (Automatic Cardioverter/Defibrillator) Present: [employer] with 1 [employer] lead4/1/09- explanted and replaced with Medtronic 2 leads on 9/2/09    ====================ASSESSMENT ==============  Heart transplant 2/2018 for ACC/AHA stage D NICM   Resolved GI Bleed, Melena s/p EGD  Acute respiratory failure, resolved  Supraventricular tachycardia  Severe hypertension  Hyperlactatemia acidosis, resolved  Leukopenia- resolved  Acute blood loss anemia, stable   CKD Stage III  C. diff diarrhea  Klebsiella oxytoca bacteremia  Sepsis  Azotemia 2/2 CKD and Steroids   Pancolitis      Plan:  ====================== NEUROLOGY=====================  Nonfocal, Continue to monitor neurological status as per ICU protocol.   Continue physical therapy, out of bed to chair as tolerated    ==================== RESPIRATORY======================  Resolved acute respiratory failure, continues on 3L nasal cannula   Encourage incentive spirometry, continue pulse ox monitoring, follow ABGs     ====================CARDIOVASCULAR==================  Heart transplant 2/2018 for ACC/AHA stage D NICM, now on solumedrol from prednisone   (NPO for abd distention) off everolimus. off cellcept while on high dose steroids   Continue hemodynamic monitoring.  Not on any pressors.  ASA on hold for hx of GI bleed/ ulceration     methylPREDNISolone sodium succinate Injectable 16 milliGRAM(s) IV Push <User Schedule>    ===================HEMATOLOGIC/ONC ===================  Anemia s/p multiple blood transfusions  Monitor H&H and transfuse prn   Hx of GI bleed     DVT prophylaxis, heparin   Injectable 5000 Unit(s) SubCutaneous every 8 hours    ===================== RENAL =========================  NORMAN on CKD stage 3, Anasarca. lasix prn   Continue monitoring urine output via diaz, I&Os, BUN/Cr  Continue Electrolyte gtt and monitor lytes.   c/w plasmalyte @120cc/hr      multiple electrolytes Injection Type 1 1000 milliLiter(s) (40 mL/Hr) IV Continuous <Continuous>  ==================== GASTROINTESTINAL===================  NPO in setting of abd distention, NGT to LWS  Monitor abd distention,  no peritonitis.   Follow up CT ABD & Pelvis on 8/28 reveals pancolitis  Pending Read for CT abd on 8/30   Gen surgery following    Resolved GI bleed s/p EGD   hx of ulceration and erosions found on SM capsule study     GI prophylaxis, pantoprazole  Injectable 40 milliGRAM(s) IV Push two times a day    =======================    ENDOCRINE  =====================  Stress hyperglycemia requiring glucose control with humalog sliding scale    insulin lispro (HumaLOG) corrective regimen sliding scale   SubCutaneous every 8 hours    ========================INFECTIOUS DISEASE================  Klebsiella bacteremia from 8/13 which has since cleared  Clostridium difficile PCR positive, continue with vancomycin PO and metronidazole IV  Started on Rectal Vanco and Eravacycline for worsening Cdiff presentation as per ID    Transplant prophylaxis with Posaconazole and Atovaquone on hold d/t ileus  Monitor Temp and WBC    8/26 CMV PCR Negative    metroNIDAZOLE  IVPB 500 milliGRAM(s) IV Intermittent every 8 hours  vancomycin    Solution 500 milliGRAM(s) Oral every 6 hours      Patient requires continuous monitoring with bedside rhythm monitoring, pulse ox monitoring, and intermittent blood gas analysis. Care plan discussed with ICU care team. Patient remained critical and required more than usual post op care.    By signing my name below, I, Breanna Newell, attest that this documentation has been prepared under the direction and in the presence of SHELLY Goode   Electronically signed: Katty Ramírez, 08-30-20 @ 09:38    I, Li Ohara , personally performed the services described in this documentation. all medical record entries made by the scribe were at my direction and in my presence. I have reviewed the chart and agree that the record reflects my personal performance and is accurate and complete  Electronically signed: SHELLY Goode

## 2020-08-30 NOTE — PROGRESS NOTE ADULT - SUBJECTIVE AND OBJECTIVE BOX
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 Patient was treated w/ prednisone and Rituximab. Tacro was discontinued and patient was also transitioned to everolimus  Admitted  6/16-6/19  for hyperglycemia.  Reported to transplant team having one done black tarry stool-  instructed to  present to [facility] for hemolytic anemia. Patient has been following with Hematology outpatient.  Denies CP  N/V diarrhea SOB. (11 Aug 2020 20:08)      Surgery/Hospital course:  8/11 Admitted to [facility] with symptomatic anemia  8/13 EGD for investigation of tarry stools  8/15 SB capsule: stomach & SB lesions, likely ulcers.  8/17 EGD/push enteroscopy w/ angioectasias/erosions in small bowel. Gastropathy and esophagitis. Ulcer seen on VCE most likely scope trauma.    8/18 VSS transferred back to floor.   8/20 VS 9.0/28.4 stable Gastric biopsy results LA Grade A esophagitis.  - Acute gastritis. Biopsies taken to r/o CMV, No ulceration seen despite finding on capsule endoscopy - likely 2/2 scope  trauma that has since resolved. Pathology pending.  8/21 VSS H/H  8.1/25.7  trending down will monitor prednisone taper 30 mg today. Procardia 120 initiated today RHC biopsy Monday.   8/22 VVS; Patient reports loose stools x 2-3 days with 1 episode today.  Stool sent for C-dif and GI PCR. Abdominal X-ray revealed: dilated loops of bowel. Abdomen distended.  Patient denies N/V. GI called to re-evaluate. Continue with current medication regimen.  Awaiting for upcoming cardiac biopsy on Monday 8/24.  8/23   vss    creat up to 2.28 ,  repeating CMP,  TTE ordered  Bladder scan   8/24   cardiac bx cancelled   elev creat  to 2.74   cardio renal cslt called,    + CDIF   inc vanco to 500 q6   ID following  8/25 VSS: RHC today as per transplant team; transfuse 2 units PRBC today as per Dr. Viramontes; continue vanco for cdiff; transfer patient to CTU after cath today   8/26. Dieretic challenge with good response   8/27: Downgraded to the floor then upgraded to the CTU again for concerning of abd distention   8/28 CT ABD & Pelvis reveals pancolitis  8/30: repeat CT scan of abd neg for megacolon, gen surgery called to re-evaluated pt.     Vital Signs Last 24 Hrs  T(C): 36.4 (30 Aug 2020 16:00), Max: 36.5 (30 Aug 2020 13:30)  T(F): 97.5 (30 Aug 2020 16:00), Max: 97.7 (30 Aug 2020 13:30)  HR: 109 (30 Aug 2020 18:00) (103 - 111)  BP: 110/70 (30 Aug 2020 18:00) (85/56 - 113/58)  BP(mean): 85 (30 Aug 2020 18:00) (66 - 87)  RR: 21 (30 Aug 2020 18:00) (9 - 27)  SpO2: 97% (30 Aug 2020 18:00) (94% - 99%)  ============================I/O===========================  I&O's Detail    29 Aug 2020 07:01  -  30 Aug 2020 07:00  --------------------------------------------------------  IN:    Albumin 5%  - 250 mL: 250 mL    Enteral Tube Flush: 180 mL    multiple electrolytes Injection Type 1: 1020 mL    Solution: 300 mL    Solution: 150 mL    Solution: 8.7 mL  Total IN: 1908.7 mL    OUT:    Indwelling Catheter - Urethral: 855 mL  Total OUT: 855 mL    Total NET: 1053.7 mL      30 Aug 2020 07:01  -  30 Aug 2020 19:06  --------------------------------------------------------  IN:    multiple electrolytes Injection Type 1: 1000 mL    Solution: 600 mL    Solution: 260 mL    Solution: 23.1 mL    Solution: 100 mL  Total IN: 1983.1 mL    OUT:    Indwelling Catheter - Urethral: 475 mL  Total OUT: 475 mL    Total NET: 1508.1 mL        ============================ LABS =========================                        8.0    7.35  )-----------( 105      ( 30 Aug 2020 16:13 )             25.6     08-30    136  |  103  |  93<H>  ----------------------------<  148<H>  3.5   |  20<L>  |  3.10<H>    Ca    8.0<L>      30 Aug 2020 16:13  Phos  5.6     08-30  Mg     3.6     08-30    TPro  6.6  /  Alb  2.5<L>  /  TBili  0.6  /  DBili  x   /  AST  30  /  ALT  19  /  AlkPhos  72  08-30    LIVER FUNCTIONS - ( 30 Aug 2020 16:13 )  Alb: 2.5 g/dL / Pro: 6.6 g/dL / ALK PHOS: 72 U/L / ALT: 19 U/L / AST: 30 U/L / GGT: x           ======================Micro/Rad/Cardio=================  Culture: Reviewed   CXR: Reviewed  Echo: Reviewed  ======================================================  PAST MEDICAL & SURGICAL HISTORY:  H/O hemolytic anemia  H/O autoimmune hemolytic anemia  Knee pain, right  HLD (hyperlipidemia)  Former smoker  DVT of upper extremity (deep vein thrombosis)  Hepatitis C virus  GIB (gastrointestinal bleeding)  Ventricular fibrillation: s/p AICD  PAF (paroxysmal atrial fibrillation): on xarelto  Non-Ischemic Cardiomyopathy  SVT (Supraventricular Tachycardia)  HTN  CHF (Congestive Heart Failure)  S/P right heart catheterization: biopsy multiple  H/O heart transplant: 2/2018  Status post left hip replacement  History of Prior Ablation Treatment: for afib  AICD (Automatic Cardioverter/Defibrillator) Present: [employer] with 1 [employer] lead4/1/09- explanted and replaced with [employer] 2 leads on 9/2/09    ========================ASSESSMENT ================  Heart transplant 2/2018 for ACC/AHA stage D NICM   Resolved GI Bleed, Melena S/P EGD  Supraventricular tachycardia  Severe hypertension  Acute blood loss anemia, stable   CKD Stage III  C. diff with diarrhea  Klebsiella oxytoca bacteremia  Sepsis  Azotemia 2/2 CKD and Steroids   Pancolitis    Plan:  ====================== NEUROLOGY=====================  Nonfocal, A&O x3, continue to monitor neurological status as per ICU protocol.   Continue physical therapy, out of bed to chair as tolerated    ==================== RESPIRATORY======================  Resolved acute respiratory failure, continues on 3L nasal cannula   Encourage incentive spirometry, continue pulse ox monitoring, follow ABGs.    ====================CARDIOVASCULAR==================  Heart transplant 2/2018 for ACC/AHA stage D NICM, now on Solumedrol from Prednisone.  (NPO for abd distention), off Everolimus.   Off cellcept while on high dose steroids   Hemodynamically stable, continue close monitoring.  Not on any pressors.  ASA on hold for hx of GI bleed/ulceration     methylPREDNISolone sodium succinate Injectable 16 milliGRAM(s) IV Push <User Schedule>  ===================HEMATOLOGIC/ONC ===================  Anemia S/P multiple blood transfusions  Monitor H&H and transfuse PRN   Hx of GI bleed, ASA on hold  C/w SQ Heparin for VTE prophylaxis     heparin   Injectable 5000 Unit(s) SubCutaneous every 8 hours    ===================== RENAL =========================  NORMAN on CKD stage 3, Anasarca. Diuresis with Lasix prn   Continue monitoring urine output via diaz, I&Os, BUN/Cr  Continue Electrolyte gtt and monitor lytes.   C/w plasmalyte @120cc/hr    ==================== GASTROINTESTINAL===================  NPO in setting of abd distention, NGT to LWS  Monitor abd distention,  no peritonitis.   CT A/P on 8/28: pancolitis  CT A/P on 8/30: pancolitis, no significant changes. negative for megacolon  Gen surgery following    multiple electrolytes Injection Type 1 1000 milliLiter(s) (120 mL/Hr) IV Continuous <Continuous>  GI prophylaxis, pantoprazole  Injectable 40 milliGRAM(s) IV Push two times a day    =======================    ENDOCRINE  =====================  Stress hyperglycemia, requiring glucose control with Humalog sliding scale    insulin lispro (HumaLOG) corrective regimen sliding scale   SubCutaneous every 8 hours    ========================INFECTIOUS DISEASE================  Clostridium difficile PCR positive  Continue with Vancomycin PO and Metronidazole IV  Started on IVIG this AM  Started on Rectal Vanco and Eravacycline this AM as well for worsening Cdiff presentation as per ID    CT A/P negative for megacolon, need to continue serial abdominal exams, along with close monitoring of temp and WBC.  Transplant prophylaxis with Posaconazole and Atovaquone on hold d/t ileus    8/26 CMV PCR Negative    metroNIDAZOLE  IVPB 500 milliGRAM(s) IV Intermittent every 8 hours  vancomycin    Solution 500 milliGRAM(s) Oral every 6 hours  vancomycin  Retention Enema 500 milliGRAM(s) Rectal every 6 hours      Patient requires continuous monitoring with bedside rhythm monitoring, pulse oximetry monitoring, and continuous central venous and arterial pressure monitoring; and intermittent blood gas analysis.  Care plan discussed with ICU care team.    Patient remained critical, at risk for life threatening decompensation.   I have spent 35 minutes providing acute care with multiple re-evaluations throughout the evening.     By signing my name below, I, Jenni Fonseca, attest that this documentation has been prepared under the direction and in the presence of Fabian Leigh MD.  Electronically signed: Katty Llamas, 08-30-20 @ 19:06    I, Fabian Leigh MD, personally performed the services described in this documentation. All medical record entries made by the scribe were at my direction and in my presence. I have reviewed the chart and agree that the record reflects my personal performance and is accurate and complete  Electronically signed: Fabian Leigh MD, 08-30-20 @ 19:06

## 2020-08-30 NOTE — PROGRESS NOTE ADULT - REASON FOR ADMISSION
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    Cardiovascular: Negative for chest pain, palpitations, orthopnea, claudication, leg swelling and PND.       Physical Exam   Constitutional: He is oriented to person, place, and time.   HENT:   Head: Normocephalic.   Eyes: Conjunctivae are normal.   Neck: No JVD present. No thyromegaly present.   Cardiovascular: Regular rhythm.  Bradycardia present.    Pulses:       Carotid pulses are 2+ on the right side, and 2+ on the left side.       Radial pulses are 2+ on the right side, and 2+ on the left side.        Posterior tibial pulses are 2+ on the right side, and 2+ on the left side.   Pulmonary/Chest: He has no wheezes.   Musculoskeletal: He exhibits no edema.   Neurological: He is oriented to person, place, and time.   Skin: Skin is warm and dry.       Hemodynamics:  Temp (24hrs), Av.4 °C (97.5 °F), Min:36.2 °C (97.1 °F), Max:36.6 °C (97.8 °F)  Temperature: 36.4 °C (97.5 °F)  Pulse  Av.8  Min: 35  Max: 70Heart Rate (Monitored): 80  Blood Pressure: 143/88 mmHg, NIBP: (!) 162/80 mmHg     Respiratory:    Respiration: 20, Pulse Oximetry: 95 %           Fluids:     Weight: (!) 128.3 kg (282 lb 13.6 oz)  GI/Nutrition:  Orders Placed This Encounter   Procedures   • Diet Order     Standing Status: Standing      Number of Occurrences: 1      Standing  Expiration Date:      Order Specific Question:  Diet:     Answer:  Cardiac [6]     Order Specific Question:  Miscellaneous modifications:     Answer:  No Caffeine [12]     Lab Results:  Recent Labs      03/17/17   1130  03/18/17   0036   WBC  7.0  6.0   RBC  4.91  4.66*   HEMOGLOBIN  15.7  14.8   HEMATOCRIT  45.9  43.2   MCV  93.5  92.7   MCH  32.0  31.8   MCHC  34.2  34.3   RDW  42.7  42.4   PLATELETCT  181  159*   MPV  10.3  10.6     Recent Labs      03/17/17   1130  03/18/17   0036   SODIUM  137  136   POTASSIUM  4.7  4.0   CHLORIDE  108  107   CO2  22  21   GLUCOSE  108*  118*   BUN  18  18   CREATININE  1.06  1.22   CALCIUM  9.0  8.3*     Recent Labs      03/17/17   1130   APTT  26.4   INR  0.96     Recent Labs      03/17/17   1130   BNPBTYPENAT  278*     Recent Labs      03/17/17   1130  03/17/17   1901  03/18/17   0036   TROPONINI  0.06*  0.07*  0.06*   BNPBTYPENAT  278*   --    --              Medical Decision Making, by Problem:  Active Hospital Problems    Diagnosis   • Bradycardia [R00.1]   • PVC (premature ventricular contraction) [I49.3]   • Chest pain, rule out acute myocardial infarction [R07.9]       Plan:   NSR-SB frequent PVCs, Diltiazem 30 q6h  Trop peaked at 0.07, on ASA, Statin   NO chest pain  NO dyspnea  Obesity  Hypertension,  BP is better controlled ( on Lisinopril )  Aortic valve disease, awaiting echo  Will D/W Dr. Ibrahim   Medications reviewed and Labs reviewed                     SOB/anemia

## 2020-08-30 NOTE — PROGRESS NOTE ADULT - SUBJECTIVE AND OBJECTIVE BOX
[facility] DIVISION OF KIDNEY DISEASES AND HYPERTENSION   -- FOLLOW UP NOTE --   Mikey Jay  Nephrology Fellow  Pager NS: [phone]   /  Pager LISKIP: [phone]  (after 5pm or weekend please page the on-call fellow)  --------------------------------------------------------------------------------  24 hour events/subjective:  - overnight hypothermic 95.7F, total UOP diaz 855 cc in the past 24hr, yesterday given albumin IV  - patient seen and examined at bedside this morning sitting comfortably in chair w/o complaints  - vitals/lab/medications reviewed, noted for drop Hgb 10 to 8.7, uptrend sCr 2.8 to 3.1    PAST HISTORY  --------------------------------------------------------------------------------  No significant changes to PMH, PSH, FHx, SHx, unless otherwise noted    ALLERGIES & MEDICATIONS  --------------------------------------------------------------------------------  Allergies    No Known Allergies    Intolerances      Standing Inpatient Medications  chlorhexidine 2% Cloths 1 Application(s) Topical <User Schedule>  cycloSPORINE  (SandIMMUNE) IVPB 30 milliGRAM(s) IV Intermittent every 24 hours  heparin   Injectable 5000 Unit(s) SubCutaneous every 8 hours  immune   globulin 10% (GAMMAGARD) IVPB 60 Gram(s) IV Intermittent every 24 hours  insulin lispro (HumaLOG) corrective regimen sliding scale   SubCutaneous every 8 hours  methylPREDNISolone sodium succinate Injectable 16 milliGRAM(s) IV Push <User Schedule>  metroNIDAZOLE  IVPB      metroNIDAZOLE  IVPB 500 milliGRAM(s) IV Intermittent every 8 hours  multiple electrolytes Injection Type 1 1000 milliLiter(s) IV Continuous <Continuous>  pantoprazole  Injectable 40 milliGRAM(s) IV Push two times a day  vancomycin    Solution 500 milliGRAM(s) Oral every 6 hours    PRN Inpatient Medications  benzocaine 15 mG/menthol 3.6 mG (Sugar-Free) Lozenge 1 Lozenge Oral every 6 hours PRN    REVIEW OF SYSTEMS  --------------------------------------------------------------------------------  Gen: no fever, chills, weakness  Respiratory: No dyspnea, cough  CV: No chest pain, orthopnea  GI: No abdominal pain, nausea, vomiting. + diarrhea  MSK: no edema  Neuro: No dizziness, lightheadedness  All other systems were reviewed and are negative, except as noted.    VITALS/PHYSICAL EXAM  --------------------------------------------------------------------------------  T(C): 35.8 (08-30-20 @ 10:00), Max: 35.9 (08-30-20 @ 04:00)  HR: 109 (08-30-20 @ 10:00) (101 - 110)  BP: 109/62 (08-30-20 @ 10:00) (85/56 - 113/58)  RR: 15 (08-30-20 @ 10:00) (9 - 21)  SpO2: 96% (08-30-20 @ 10:00) (96% - 99%)  Wt(kg): --    08-29-20 @ 07:01  -  08-30-20 @ 07:00  --------------------------------------------------------  IN: 1908.7 mL / OUT: 855 mL / NET: 1053.7 mL    08-30-20 @ 07:01  -  08-30-20 @ 11:28  --------------------------------------------------------  IN: 376.3 mL / OUT: 135 mL / NET: 241.3 mL    Physical Exam:  	Gen: NAD on nasal cannula  	HEENT: NGT in place  	Pulm: CTA B/l anteriorly  	CV: Tachycardic   	Abd: +BS, soft, NTND       	: + diaz catheter in place w/ urine  	MSK: Warm, bilateral lower ext edema  	Neuro: No focal deficits, AOX3, no asterixis   	Psych: Appropriate Affect  	Vascular Access: None    LABS/STUDIES  --------------------------------------------------------------------------------              8.7    7.22  >-----------<  112      [08-30-20 @ 01:19]              27.3     139  |  102  |  85  ----------------------------<  125      [08-30-20 @ 01:19]  3.4   |  20  |  3.17        Ca     7.9     [08-30-20 @ 01:19]      Mg     3.6     [08-30-20 @ 01:19]      Phos  5.6     [08-30-20 @ 01:19]    TPro  5.5  /  Alb  2.6  /  TBili  0.8  /  DBili  x   /  AST  32  /  ALT  18  /  AlkPhos  65  [08-30-20 @ 01:19]    Creatinine Trend:  SCr 3.17 [08-30 @ 01:19]  SCr 3.00 [08-29 @ 01:02]  SCr 2.86 [08-28 @ 01:18]  SCr 2.86 [08-27 @ 12:24]  SCr 2.65 [08-27 @ 00:26]    Urinalysis - [08-24-20 @ 21:27]      Color Yellow / Appearance Slightly Turbid / SG 1.018 / pH 5.5      Gluc Negative / Ketone Negative  / Bili Negative / Urobili <2 mg/dL       Blood Negative / Protein 30 mg/dL / Leuk Est Negative / Nitrite Negative      RBC 1 / WBC 3 / Hyaline 2 / Gran  / Sq Epi  / Non Sq Epi 2 / Bacteria Negative    Urine Creatinine 188      [08-24-20 @ 17:29]  Urine Sodium <35      [08-24-20 @ 17:29]  Urine Chloride <35      [08-24-20 @ 17:29]    Iron 36, TIBC 218, %sat 16      [08-15-20 @ 21:08]  Ferritin 764      [08-15-20 @ 21:12]  Vitamin D (25OH) 33.5      [04-30-20 @ 09:06]  HbA1c 4.7      [11-07-18 @ 23:18]  TSH 1.22      [08-12-20 @ 00:18]

## 2020-08-30 NOTE — PROGRESS NOTE ADULT - PROBLEM SELECTOR PLAN 6
-Appr ID input  -Cdiff PCR positive  -C/w vanc PO and metronidazole; rectal vanc and eravacycline 8/30  -will trial IVIG despite unclear benefit  -Contact precautions  -Appr gen sx input  -NGT in place; will defer removal to GI/gen sx/ID  -CT A/P with pancolitis

## 2020-08-30 NOTE — PROGRESS NOTE ADULT - ASSESSMENT
70y old  Male with pmhx of  NICM s/p HM2 s/p OHT in 2018, autoimmune hemolytic anemia s/p steroids/rituxan, admitted for GIB c/b bacteremia, cdiff, and now NORMAN.    #NORMAN   - non oliguric NORMAN like multifactorial 2/2 infection, diarrhea, everolimus   - His baseline Scr ~1mg/dl, peaked at 3 mg/dl on 8/25, now plateaued 2.9-3.0  - UA with 30 protein, no WBC or RBC, Urine Na<35, Urine Cl<35  - Urine studies consistent with CRS picture, could have ATN as well given the relative hypotension, lowest documented BP- 102/70. Last Everolimus trough was on 8/20 and noted to be elevated at ~10  - RHC on 8/25 with elevated pressures, given 2u pRBC and 40mg IV lasix with good response 8/25-26  - currently monitoring off diuretics with good UOP, continue monitor UOP. Consider IVF.   - F/u surgery following for ileus and colitis   - F/u everolimus level, may need to adjust dose in setting of diarrhea/NORMAN  - Monitor UOP and daily weights. Dose medications as per eGFR<20      #Azotemia  - 2/2 NORMAN with use of diuretics and steroids, no evidence of uremia. Consider IVF.     #Metabolic Acidosis  - HAGMA with respiratory alkalosis   - Likely 2/2 NORMAN  - continue to monitor    #Anemia  - 2/2 GIB, s/p 2 units on 8/25 with appropriate response  - Iron stores low, cannot give IV iron in setting of active infection  - pRBC transfusions per primary team  - No signs of active hemolysis

## 2020-08-30 NOTE — PROGRESS NOTE ADULT - SUBJECTIVE AND OBJECTIVE BOX
Follow Up:  C diff colitis    Interval History/ROS: OOB to chair, lethargic, RN notes no recent bowel movments or flatulence    Allergies  No Known Allergies    ANTIMICROBIALS:  metroNIDAZOLE  IVPB    metroNIDAZOLE  IVPB 500 every 8 hours  vancomycin    Solution 500 every 6 hours    OTHER MEDS:  MEDICATIONS  (STANDING):  heparin   Injectable 5000 every 8 hours  immune   globulin 10% (GAMMAGARD) IVPB 60 every 24 hours  insulin lispro (HumaLOG) corrective regimen sliding scale  every 8 hours  methylPREDNISolone sodium succinate Injectable 16 <User Schedule>  pantoprazole  Injectable 40 two times a day      Vital Signs Last 24 Hrs  T(C): 35.8 (30 Aug 2020 10:00), Max: 35.9 (30 Aug 2020 04:00)  T(F): 96.4 (30 Aug 2020 10:00), Max: 96.6 (30 Aug 2020 04:00)  HR: 109 (30 Aug 2020 10:00) (101 - 115)  BP: 109/62 (30 Aug 2020 10:00) (85/56 - 113/58)  BP(mean): 80 (30 Aug 2020 10:00) (66 - 84)  RR: 15 (30 Aug 2020 10:00) (9 - 21)  SpO2: 96% (30 Aug 2020 10:00) (96% - 99%)    PHYSICAL EXAM:  General: WN/WD NAD, Non-toxic  Neurology: A&Ox3, nonfocal  Respiratory: Clear to auscultation bilaterally  CV: RRR, S1S2, no murmurs, rubs or gallops  Abdominal: tight, distended, no bowel sounds appreciated  Extremities: no edema,   Line Sites: Clear  Skin: No rash                        8.7    7.22  )-----------( 112      ( 30 Aug 2020 01:19 )             27.3   WBC Count: 7.22 (08-30 @ 01:19)  WBC Count: 4.77 (08-29 @ 01:03)  WBC Count: 4.42 (08-28 @ 01:17)  WBC Count: 4.80 (08-27 @ 00:26)  WBC Count: 6.38 (08-26 @ 14:35)  WBC Count: 5.83 (08-26 @ 04:54)  WBC Count: 8.34 (08-25 @ 19:15)  WBC Count: see note (08-25 @ 10:54)      08-30    139  |  102  |  85<H>  ----------------------------<  125<H>  3.4<L>   |  20<L>  |  3.17<H>    Ca    7.9<L>      30 Aug 2020 01:19  Phos  5.6     08-30  Mg     3.6     08-30    TPro  5.5<L>  /  Alb  2.6<L>  /  TBili  0.8  /  DBili  x   /  AST  32  /  ALT  18  /  AlkPhos  65  08-30    MICROBIOLOGY:  .Blood Triple Lumen BROWN  08-25-20   No growth to date.  --  --      .Blood Blood  08-24-20   No Growth Final  --  --      .Stool Feces  08-23-20   GI PCR Results: NOT detected      .Urine Clean Catch (Midstream)  08-14-20   No growth  --  --      .Blood Blood  08-14-20   No Growth Final  --  --      .Blood Blood-Peripheral  08-13-20   Growth in aerobic and anaerobic bottles: Klebsiella oxytoca/Raoutella  ornithinolytica  See previous culture [id number]  --    Growth in anaerobic bottle: Gram Negative Rods  Growth in aerobic bottle: Gram Negative Rods      .Blood Blood-Peripheral  08-13-20   Growth in anaerobic bottle: Klebsiella oxytoca/Raoutella ornithinolytica      .Urine Clean Catch (Midstream)  08-13-20   >=3 organisms. Probable collection contamination.  --  --    RADIOLOGY:  < from: Xray Chest 1 View- PORTABLE-Routine (08.30.20 @ 03:13) >  FINDINGS/  IMPRESSION:    Enteric tube tip within the stomach. Right IJ line within SVC.    Right pleural effusion is unchanged. No pneumothorax.    < end of copied text >    < from: CT Abdomen and Pelvis No Cont (08.28.20 @ 16:18) >  MPRESSION:  Debris, likely mucous plug, within the bronchus intermedius resulting in complete atelectasis of the right middle and lower lobes.    Resolution of patchy opacities previously seen in the left upper and lower lobes.    Pancolitis.    A tubular calcification adjacent to the distal aspect of the left IJ central venous catheter that may represent a fibrin sheath.    Enteric tube side-port is in the distal esophagus.    < end of copied text >      Ricky Pope MD; Division of Infectious Disease; Pager: [phone]; nights and weekends: [phone]

## 2020-08-31 DIAGNOSIS — A04.72 ENTEROCOLITIS DUE TO CLOSTRIDIUM DIFFICILE, NOT SPECIFIED AS RECURRENT: ICD-10-CM

## 2020-08-31 DIAGNOSIS — D59.1 OTHER AUTOIMMUNE HEMOLYTIC ANEMIAS: ICD-10-CM

## 2020-08-31 DIAGNOSIS — N17.9 ACUTE KIDNEY FAILURE, UNSPECIFIED: ICD-10-CM

## 2020-08-31 LAB
CULTURE RESULTS: SIGNIFICANT CHANGE UP
CYCLOSPORINE SER-MCNC: 77 NG/ML — LOW (ref 150–400)
CYCLOSPORINE SER-MCNC: 82 NG/ML — LOW (ref 150–400)
GLUCOSE BLDC GLUCOMTR-MCNC: 126 MG/DL — HIGH (ref 70–99)
GLUCOSE BLDC GLUCOMTR-MCNC: 132 MG/DL — HIGH (ref 70–99)
GLUCOSE BLDC GLUCOMTR-MCNC: 137 MG/DL — HIGH (ref 70–99)
SPECIMEN SOURCE: SIGNIFICANT CHANGE UP
SURGICAL PATHOLOGY STUDY: SIGNIFICANT CHANGE UP

## 2020-08-31 PROCEDURE — 99232 SBSQ HOSP IP/OBS MODERATE 35: CPT | Mod: GC

## 2020-08-31 PROCEDURE — 93971 EXTREMITY STUDY: CPT | Mod: 26

## 2020-08-31 PROCEDURE — 99291 CRITICAL CARE FIRST HOUR: CPT

## 2020-08-31 PROCEDURE — 71045 X-RAY EXAM CHEST 1 VIEW: CPT | Mod: 26

## 2020-08-31 RX ORDER — CYCLOSPORINE 100 MG/1
15 CAPSULE ORAL EVERY 24 HOURS
Refills: 0 | Status: DISCONTINUED | OUTPATIENT
Start: 2020-08-31 | End: 2020-09-03

## 2020-08-31 RX ORDER — POTASSIUM CHLORIDE 20 MEQ
10 PACKET (EA) ORAL ONCE
Refills: 0 | Status: COMPLETED | OUTPATIENT
Start: 2020-08-31 | End: 2020-08-31

## 2020-08-31 RX ADMIN — CYCLOSPORINE 4.19 MILLIGRAM(S): 100 CAPSULE ORAL at 08:10

## 2020-08-31 RX ADMIN — PANTOPRAZOLE SODIUM 40 MILLIGRAM(S): 20 TABLET, DELAYED RELEASE ORAL at 17:16

## 2020-08-31 RX ADMIN — Medication 50 MILLIEQUIVALENT(S): at 21:00

## 2020-08-31 RX ADMIN — Medication 500 MILLIGRAM(S): at 23:42

## 2020-08-31 RX ADMIN — HEPARIN SODIUM 5000 UNIT(S): 5000 INJECTION INTRAVENOUS; SUBCUTANEOUS at 14:26

## 2020-08-31 RX ADMIN — Medication 500 MILLIGRAM(S): at 05:03

## 2020-08-31 RX ADMIN — Medication 100 MILLIGRAM(S): at 00:17

## 2020-08-31 RX ADMIN — PANTOPRAZOLE SODIUM 40 MILLIGRAM(S): 20 TABLET, DELAYED RELEASE ORAL at 05:02

## 2020-08-31 RX ADMIN — Medication 500 MILLIGRAM(S): at 00:17

## 2020-08-31 RX ADMIN — Medication 500 MILLIGRAM(S): at 11:09

## 2020-08-31 RX ADMIN — Medication 500 MILLIGRAM(S): at 09:09

## 2020-08-31 RX ADMIN — Medication 500 MILLIGRAM(S): at 18:15

## 2020-08-31 RX ADMIN — Medication 500 MILLIGRAM(S): at 22:35

## 2020-08-31 RX ADMIN — CYCLOSPORINE 2.1 MILLIGRAM(S): 100 CAPSULE ORAL at 16:14

## 2020-08-31 RX ADMIN — Medication 16 MILLIGRAM(S): at 08:32

## 2020-08-31 RX ADMIN — Medication 100 MILLIGRAM(S): at 09:09

## 2020-08-31 RX ADMIN — HEPARIN SODIUM 5000 UNIT(S): 5000 INJECTION INTRAVENOUS; SUBCUTANEOUS at 22:35

## 2020-08-31 RX ADMIN — Medication 500 MILLIGRAM(S): at 14:26

## 2020-08-31 RX ADMIN — HEPARIN SODIUM 5000 UNIT(S): 5000 INJECTION INTRAVENOUS; SUBCUTANEOUS at 05:02

## 2020-08-31 RX ADMIN — CHLORHEXIDINE GLUCONATE 1 APPLICATION(S): 213 SOLUTION TOPICAL at 05:02

## 2020-08-31 RX ADMIN — IMMUNE GLOBULIN (HUMAN) 75 GRAM(S): 10 INJECTION INTRAVENOUS; SUBCUTANEOUS at 11:06

## 2020-08-31 RX ADMIN — Medication 500 MILLIGRAM(S): at 01:55

## 2020-08-31 RX ADMIN — Medication 100 MILLIGRAM(S): at 17:16

## 2020-08-31 NOTE — PROGRESS NOTE ADULT - PROBLEM SELECTOR PLAN 2
- Reduce IV cyclosporine to 15mg/24hours to reduce overall level of immunosuppression. Repeat everolimus level.    - Continue current methylprednisone dose (equivalent to 20 mg daily of prednisone).

## 2020-08-31 NOTE — PROGRESS NOTE ADULT - ASSESSMENT
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 He has responded appropriately to a single blood transfusion. Of note, he recently has developed dark colored stools. Will investigate if anemia is due to GI bleeding in setting of steroid use or hemolysis and manage appropriately. Underwent UGI without bleeding source identified. Developed Klebsiella oxytoca bacteremia requiring transfer to CTU. Clinically improving, transferred to [facility], finished 10-day of ceftriaxone, however, developed severe C.diff colitis on Vancomycin 500mg q6h PO and IV Flagyl. He had RHC on 8/25 and found to have high filling pressure so transferred to CTU again.    #C.diff PCR detected (8/23): severe C.diff colitis - Repeat CT on 8/30 without evidence of toxic megacolon. Appears to be clinically improving. Surgery advised to cont to monitor.   - Cont Vancomycin 500 mg per rectum every 6 hours  - continue PO Vanco 500 q 6h plus IV Flagyl 500 every 8 hours  - cont ERAVAYCLINE 1 mg/kg (750 mg)  every 12 hours  - cont IVIG   - not eligible for fecal transplant     #Klebsiella oxytoca bacteremia: resolved   Meropenem (8/14-8/17)  Cefepime (8/17-20)  Ceftriaxone (8/20-)  - Growing in blood cultures 2/2 sets on 8/13  - Repeat blood cultures x2 sets 8/14 no growth final  - s/p ceftriaxone until Monday 8/24 to finish a 10-day course  - blood cultures 8/25 NGTD    #OI prophylaxis-  -has been receiving high dose steroids since 5/20  -On IV cyclosporine now  -CMV viral load(8/17, 8/26): neg  -Valcyte and Bactrim d/c'ed on 8/17 due to leukopenia  -Galactomann<0.5, Fungitell<31  -Per hemoc, will taper prednisone every 7 days by 10mg  -Will consider IV bactrim for PCP ppx next week    #Pancytopenia- on admission  - leukopenia improving, remains with thrombocytopenia  -COVID PCR neg  -Tick panel neg  - treating cdiff colitis    #Anemia- r/o GI bleeding  s/p EGD  EGD 8/17: esophagitis, acute gastritis s/p biopsy, no ulceration  On PPI 40mg daily  - cont to monitor cbc      Cm Infante MD  Fellow, Infectious Diseases, PGY-4  Pager: [phone]  After 5pm/Weekends: Call [phone] 69 YO M with a history of ACC/AHA Stage D NICM s/p OHT 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 He has responded appropriately to a single blood transfusion. Of note, he recently has developed dark colored stools. Will investigate if anemia is due to GI bleeding in setting of steroid use or hemolysis and manage appropriately. Underwent UGI without bleeding source identified. Developed Klebsiella oxytoca bacteremia requiring transfer to CTU. Clinically improving, transferred to [facility], finished 10-day of ceftriaxone, however, developed severe C.diff colitis on Vancomycin 500mg q6h PO and IV Flagyl. He had RHC on 8/25 and found to have high filling pressure so transferred to CTU again.    #C.diff PCR detected (8/23): severe C.diff colitis with NORMAN- Repeat CT on 8/30 without evidence of toxic megacolon. Appears to be clinically improving. Surgery advised to cont to monitor.   - Cont Vancomycin 500 mg per rectum every 6 hours  - continue PO Vanco 500 q 6h plus IV Flagyl 500 every 8 hours  - cont ERAVAYCLINE 1 mg/kg (750 mg)  every 12 hours  - cont IVIG   - not eligible for fecal transplant     #Klebsiella oxytoca bacteremia: resolved   Meropenem (8/14-8/17)  Cefepime (8/17-20)  Ceftriaxone (8/20-)  - Growing in blood cultures 2/2 sets on 8/13  - Repeat blood cultures x2 sets 8/14 no growth final  - s/p ceftriaxone until Monday 8/24 to finish a 10-day course  - blood cultures 8/25 NGTD    #OI prophylaxis-  -has been receiving high dose steroids since 5/20  -On IV cyclosporine now  -CMV viral load(8/17, 8/26): neg  -Valcyte and Bactrim d/c'ed on 8/17 due to leukopenia  -Galactomann<0.5, Fungitell<31  -Per hemoc, will taper prednisone every 7 days by 10mg  -Will consider IV bactrim for PCP ppx next week    #Pancytopenia- on admission  - leukopenia improving, remains with thrombocytopenia  -COVID PCR neg  -Tick panel neg  - treating cdiff colitis    #Anemia- r/o GI bleeding  s/p EGD  EGD 8/17: esophagitis, acute gastritis s/p biopsy, no ulceration  On PPI 40mg daily  - cont to monitor cbc      Cm Infante MD  Fellow, Infectious Diseases, PGY-4  Pager: [phone]  After 5pm/Weekends: Call [phone]

## 2020-08-31 NOTE — CONSULT NOTE ADULT - ATTENDING COMMENTS
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  No further hemolysis, now currently being tapered.  BMBx on 5/27 no evidence of underlying bone marrow disorder.   Labs completed on 7/27 no evidence of hemolysis, direct Jacky still with IgG, negative C3.  His worsened anemia is likely due to GI blood loss.  Recommend continued taper of prednisone.  Continue protonix, would change to IV.  GI evaluation.   Transfusional support to keep Hg >7.
Marga Martines, PhD  [phone]
Patient seen and examined. Agree with above. Patient presents with melena and acute blood loss anemia. Plan for push enteroscopy today given history of duodenal angioectasia.
Patient seen and examined. Agree with above. Mere has C diff and has increasing distended abdomen on exam. Only 25 cc of output via nGT in the last 24 hours. Differential includes ileus, vs toxic megacolon, but exam is very benign at this time with only distension and not much pain at all. Would follow-up repeat CT of abdomen/pelvis. keep NGT for now. Follow-up with ID regarding therapies for C diff.
I have seen this patient with the fellow and agree with their assessment and plan. In addition,    # NORMAN/ ATN - Baseline serum creatinine 1.0, now up to 3.1. UA - no evidence of blood or WBC. Urine Na<35, suggestive of low effective arterial blood volume. This could be due to cardiorenal syndrome or volume depletion from diarrhea. Additionally, he likely could have developed ATN from relative hypotension.   No uremic symptoms. No acute indication for dialysis.    Plan: Check trough everolimus level. Place a diaz catheter for I/O monitoring. We will await RHC results to determine if we should diurese him.     # Anemia - check iron studies. We recommend transfusing PRBC to improve effective arterial blood volume.     # Hypotension - Possibly he is third spacing. ? sepsis. Nfedipine has been held since this morning. Continue to monitor BP, keep MAP >65 to ensure renal perfusion.     # Medication toxicity Monitoring: medication dose reviewed. Please dose medications to CrCl<15    # Hypokalemia - Serum potassium is 3.4 (8/25/20Z). He likely had potassium loss through diarrhea. Replete K to keep K ~4.0    The rest of the recommendations as per fellow's note.    Umm Fang MD  Attending Nephrologist  [phone]
Hemolysis labs pending. Will consult GI for consideration of EGD given dark stools with prednisone use. Heme recs appreciated. Continue immunosuppression.

## 2020-08-31 NOTE — PROGRESS NOTE ADULT - SUBJECTIVE AND OBJECTIVE BOX
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 Patient was treated w/ prednisone and Rituximab. Tacro was discontinued and patient was also transitioned to everolimus  Admitted  6/16-6/19  for hyperglycemia.  Reported to transplant team having one done black tarry stool-  instructed to  present to [facility] for hemolytic anemia. Patient has been following with Hematology outpatient.  Denies CP  N/V diarrhea SOB. (11 Aug 2020 20:08)       prior hospital charts reviewed [  ]  primary team notes reviewed [  ]  other consultant notes reviewed [  ]    PAST MEDICAL & SURGICAL HISTORY:  H/O hemolytic anemia  H/O autoimmune hemolytic anemia  Knee pain, right  HLD (hyperlipidemia)  Former smoker  DVT of upper extremity (deep vein thrombosis)  Hepatitis C virus  GIB (gastrointestinal bleeding)  Ventricular fibrillation: s/p AICD  PAF (paroxysmal atrial fibrillation): on xarelto  Non-Ischemic Cardiomyopathy  SVT (Supraventricular Tachycardia)  HTN  CHF (Congestive Heart Failure)  S/P right heart catheterization: biopsy multiple  H/O heart transplant: 2/2018  Status post left hip replacement  History of Prior Ablation Treatment: for afib  AICD (Automatic Cardioverter/Defibrillator) Present: [employer] with 1 [employer] lead4/1/09- explanted and replaced with [employer] 2 leads on 9/2/09      Allergies  No Known Allergies    ANTIMICROBIALS (past 90 days)  MEDICATIONS  (STANDING):  atovaquone Suspension   1500 milliGRAM(s) Oral (08-27-20 @ 13:54)   1500 milliGRAM(s) Oral (08-26-20 @ 11:23)   1500 milliGRAM(s) Oral (08-25-20 @ 08:49)   1500 milliGRAM(s) Oral (08-24-20 @ 09:12)   1500 milliGRAM(s) Oral (08-23-20 @ 09:26)   1500 milliGRAM(s) Oral (08-22-20 @ 11:15)   1500 milliGRAM(s) Oral (08-21-20 @ 11:26)   1500 milliGRAM(s) Oral (08-20-20 @ 12:02)   1500 milliGRAM(s) Oral (08-19-20 @ 20:07)    cefepime   IVPB   100 mL/Hr IV Intermittent (08-20-20 @ 06:00)   100 mL/Hr IV Intermittent (08-19-20 @ 21:40)   100 mL/Hr IV Intermittent (08-19-20 @ 13:17)   100 mL/Hr IV Intermittent (08-19-20 @ 05:31)   100 mL/Hr IV Intermittent (08-18-20 @ 21:17)   100 mL/Hr IV Intermittent (08-18-20 @ 15:09)   100 mL/Hr IV Intermittent (08-18-20 @ 05:49)   100 mL/Hr IV Intermittent (08-17-20 @ 21:33)   100 mL/Hr IV Intermittent (08-17-20 @ 15:42)    cefepime   IVPB   100 mL/Hr IV Intermittent (08-14-20 @ 05:15)   100 mL/Hr IV Intermittent (08-13-20 @ 23:45)    cefTRIAXone   IVPB   100 mL/Hr IV Intermittent (08-24-20 @ 14:25)   100 mL/Hr IV Intermittent (08-23-20 @ 13:44)   100 mL/Hr IV Intermittent (08-22-20 @ 13:10)   100 mL/Hr IV Intermittent (08-21-20 @ 12:44)   100 mL/Hr IV Intermittent (08-20-20 @ 13:26)    meropenem  IVPB   100 mL/Hr IV Intermittent (08-17-20 @ 05:31)   100 mL/Hr IV Intermittent (08-16-20 @ 21:12)   100 mL/Hr IV Intermittent (08-16-20 @ 14:19)   100 mL/Hr IV Intermittent (08-16-20 @ 05:59)   100 mL/Hr IV Intermittent (08-15-20 @ 22:12)   100 mL/Hr IV Intermittent (08-15-20 @ 13:09)   100 mL/Hr IV Intermittent (08-15-20 @ 06:26)   100 mL/Hr IV Intermittent (08-14-20 @ 21:19)   100 mL/Hr IV Intermittent (08-14-20 @ 13:44)    metroNIDAZOLE  IVPB   100 mL/Hr IV Intermittent (08-24-20 @ 11:02)    metroNIDAZOLE  IVPB   100 mL/Hr IV Intermittent (08-31-20 @ 09:09)   100 mL/Hr IV Intermittent (08-31-20 @ 00:17)   100 mL/Hr IV Intermittent (08-30-20 @ 17:54)   100 mL/Hr IV Intermittent (08-30-20 @ 08:46)   100 mL/Hr IV Intermittent (08-30-20 @ 01:27)   100 mL/Hr IV Intermittent (08-29-20 @ 17:03)   100 mL/Hr IV Intermittent (08-29-20 @ 08:05)   100 mL/Hr IV Intermittent (08-29-20 @ 00:45)   100 mL/Hr IV Intermittent (08-28-20 @ 18:04)   100 mL/Hr IV Intermittent (08-28-20 @ 08:27)   100 mL/Hr IV Intermittent (08-28-20 @ 01:29)   100 mL/Hr IV Intermittent (08-27-20 @ 17:27)   100 mL/Hr IV Intermittent (08-27-20 @ 08:48)   100 mL/Hr IV Intermittent (08-27-20 @ 01:10)   100 mL/Hr IV Intermittent (08-26-20 @ 17:15)   100 mL/Hr IV Intermittent (08-26-20 @ 08:03)   100 mL/Hr IV Intermittent (08-26-20 @ 01:49)   100 mL/Hr IV Intermittent (08-25-20 @ 17:00)   100 mL/Hr IV Intermittent (08-25-20 @ 06:13)   100 mL/Hr IV Intermittent (08-24-20 @ 22:48)   100 mL/Hr IV Intermittent (08-24-20 @ 14:25)    posaconazole DR Tablet   300 milliGRAM(s) Oral (08-27-20 @ 13:31)   300 milliGRAM(s) Oral (08-26-20 @ 11:24)   300 milliGRAM(s) Oral (08-25-20 @ 08:51)   300 milliGRAM(s) Oral (08-24-20 @ 09:15)   300 milliGRAM(s) Oral (08-23-20 @ 09:28)   300 milliGRAM(s) Oral (08-22-20 @ 11:14)   300 milliGRAM(s) Oral (08-21-20 @ 11:23)   300 milliGRAM(s) Oral (08-20-20 @ 12:01)   300 milliGRAM(s) Oral (08-19-20 @ 11:35)   300 milliGRAM(s) Oral (08-18-20 @ 12:06)   300 milliGRAM(s) Oral (08-17-20 @ 15:35)   300 milliGRAM(s) Oral (08-16-20 @ 11:15)   300 milliGRAM(s) Oral (08-15-20 @ 13:08)   300 milliGRAM(s) Oral (08-14-20 @ 16:04)   300 milliGRAM(s) Oral (08-13-20 @ 11:04)   300 milliGRAM(s) Oral (08-12-20 @ 12:27)    trimethoprim   80 mG/sulfamethoxazole 400 mG   1 Tablet(s) Oral (08-16-20 @ 11:15)   1 Tablet(s) Oral (08-15-20 @ 13:08)   1 Tablet(s) Oral (08-14-20 @ 13:29)   1 Tablet(s) Oral (08-13-20 @ 11:02)   1 Tablet(s) Oral (08-12-20 @ 12:27)    valGANciclovir   450 milliGRAM(s) Oral (08-13-20 @ 11:02)   450 milliGRAM(s) Oral (08-12-20 @ 12:27)    valGANciclovir   450 milliGRAM(s) Oral (08-16-20 @ 11:15)   450 milliGRAM(s) Oral (08-15-20 @ 13:08)   450 milliGRAM(s) Oral (08-14-20 @ 13:29)    vancomycin    Solution   125 milliGRAM(s) Oral (08-24-20 @ 11:02)   125 milliGRAM(s) Oral (08-24-20 @ 05:34)   125 milliGRAM(s) Oral (08-23-20 @ 23:59)   125 milliGRAM(s) Oral (08-23-20 @ 17:32)   125 milliGRAM(s) Oral (08-23-20 @ 13:45)    vancomycin    Solution   500 milliGRAM(s) Oral (08-31-20 @ 14:26)   500 milliGRAM(s) Oral (08-31-20 @ 09:09)   500 milliGRAM(s) Oral (08-31-20 @ 01:55)   500 milliGRAM(s) Oral (08-30-20 @ 19:59)   500 milliGRAM(s) Oral (08-30-20 @ 15:42)   500 milliGRAM(s) Oral (08-30-20 @ 08:46)   500 milliGRAM(s) Oral (08-30-20 @ 01:28)   500 milliGRAM(s) Oral (08-29-20 @ 20:33)   500 milliGRAM(s) Oral (08-29-20 @ 13:22)   500 milliGRAM(s) Oral (08-29-20 @ 08:05)   500 milliGRAM(s) Oral (08-29-20 @ 01:56)   500 milliGRAM(s) Oral (08-28-20 @ 20:06)   500 milliGRAM(s) Oral (08-28-20 @ 14:09)   500 milliGRAM(s) Oral (08-28-20 @ 07:30)   500 milliGRAM(s) Oral (08-28-20 @ 01:30)   500 milliGRAM(s) Oral (08-27-20 @ 21:18)   500 milliGRAM(s) Oral (08-27-20 @ 13:06)   500 milliGRAM(s) Oral (08-27-20 @ 07:56)   500 milliGRAM(s) Oral (08-27-20 @ 01:10)   500 milliGRAM(s) Oral (08-26-20 @ 20:07)   500 milliGRAM(s) Oral (08-26-20 @ 13:12)   500 milliGRAM(s) Oral (08-26-20 @ 08:03)   500 milliGRAM(s) Oral (08-26-20 @ 01:49)   500 milliGRAM(s) Oral (08-25-20 @ 20:10)   500 milliGRAM(s) Oral (08-25-20 @ 12:04)   500 milliGRAM(s) Oral (08-25-20 @ 06:14)   500 milliGRAM(s) Oral (08-24-20 @ 23:49)   500 milliGRAM(s) Oral (08-24-20 @ 17:18)    vancomycin  IVPB   250 mL/Hr IV Intermittent (08-13-20 @ 23:45)    vancomycin  IVPB   250 mL/Hr IV Intermittent (08-14-20 @ 05:15)    vancomycin  Retention Enema   500 milliGRAM(s) Rectal (08-31-20 @ 11:09)   500 milliGRAM(s) Rectal (08-31-20 @ 05:03)   500 milliGRAM(s) Rectal (08-31-20 @ 00:17)   500 milliGRAM(s) Rectal (08-30-20 @ 17:12)        metroNIDAZOLE  IVPB    metroNIDAZOLE  IVPB 500 every 8 hours  vancomycin    Solution 500 every 6 hours  vancomycin  Retention Enema 500 every 6 hours    OTHER MEDS: MEDICATIONS  (STANDING):  cycloSPORINE  (SandIMMUNE) IVPB 15 every 24 hours  heparin   Injectable 5000 every 8 hours  immune   globulin 10% (GAMMAGARD) IVPB 60 every 24 hours  insulin lispro (HumaLOG) corrective regimen sliding scale  every 8 hours  methylPREDNISolone sodium succinate Injectable 16 <User Schedule>  pantoprazole  Injectable 40 two times a day    SOCIAL HISTORY:   Social History:  Lives in room  former tobacco   denies alcohol and illicit (11 Aug 2020 20:08)      FAMILY HISTORY:  No pertinent family history in first degree relatives    REVIEW OF SYSTEMS  [  ] ROS unobtainable because:    [  ] All other systems negative except as noted below:	    Constitutional:  [ ] fever [ ] chills  [ ] weight loss  [ ] weakness  Skin:  [ ] rash [ ] phlebitis	  Eyes: [ ] icterus [ ] pain  [ ] discharge	  ENMT: [ ] sore throat  [ ] thrush [ ] ulcers [ ] exudates  Respiratory: [ ] dyspnea [ ] hemoptysis [ ] cough [ ] sputum	  Cardiovascular:  [ ] chest pain [ ] palpitations [ ] edema	  Gastrointestinal:  [ ] nausea [ ] vomiting [ ] diarrhea [ ] constipation [ ] pain	  Genitourinary:  [ ] dysuria [ ] frequency [ ] hematuria [ ] discharge [ ] flank pain  [ ] incontinence  Musculoskeletal:  [ ] myalgias [ ] arthralgias [ ] arthritis  [ ] back pain  Neurological:  [ ] headache [ ] seizures  [ ] confusion/altered mental status  Psychiatric:  [ ] anxiety [ ] depression	  Hematology/Lymphatics:  [ ] lymphadenopathy  Endocrine:  [ ] adrenal [ ] thyroid  Allergic/Immunologic:	 [ ] transplant [ ] seasonal    Vital Signs Last 24 Hrs  T(F): 98.6 (08-31-20 @ 08:00), Max: 99 (08-28-20 @ 20:00)  Vital Signs Last 24 Hrs  HR: 109 (08-31-20 @ 14:00) (104 - 114)  BP: 121/76 (08-31-20 @ 14:00) (98/70 - 125/86)  RR: 22 (08-31-20 @ 14:00)  SpO2: 96% (08-31-20 @ 14:00) (93% - 99%)  Wt(kg): --    PHYSICAL EXAM:  Constitutional: non-toxic, no distress  HEAD/EYES: anicteric, no conjunctival injection  ENT:  supple, no thrush  Cardiovascular:   normal S1, S2, no murmur, no edema  Respiratory:  clear BS bilaterally, no wheezes, no rales  GI:  soft, non-tender, normal bowel sounds  :  no diaz, no CVA tenderness  Musculoskeletal:  no synovitis, normal ROM  Neurologic: awake and alert, normal strength, no focal findings  Skin:  no rash, no erythema, no phlebitis  Heme/Onc: no lymphadenopathy   Psychiatric:  awake, alert, appropriate mood                            8.2    8.05  )-----------( 107      ( 30 Aug 2020 22:12 )             25.3   08-30    137  |  103  |  87<H>  ----------------------------<  133<H>  3.3<L>   |  21<L>  |  2.70<H>    Ca    7.8<L>      30 Aug 2020 22:12  Phos  5.1     08-30  Mg     3.6     08-30    TPro  6.6  /  Alb  2.4<L>  /  TBili  0.6  /  DBili  x   /  AST  33  /  ALT  17  /  AlkPhos  73  08-30    MICROBIOLOGY:              RADIOLOGY:  imaging below personally reviewed and agree with findings

## 2020-08-31 NOTE — PROGRESS NOTE ADULT - SUBJECTIVE AND OBJECTIVE BOX
ID Follow Up      Interval History/ROS:Patient is a 70y old  Male who presents with a chief complaint of SOB/anemia (31 Aug 2020 14:38)    ID consulted for C diff colitis    Pt was started on Eravacycline and Rectal vanc yesterday. BM improved with more consistency. Taking ice chips. No other complaints. Denies headache, fever, chills, nausea, dysuria.     Allergies    No Known Allergies    Intolerances        ANTIMICROBIALS:  metroNIDAZOLE  IVPB    metroNIDAZOLE  IVPB 500 every 8 hours  vancomycin    Solution 500 every 6 hours  vancomycin  Retention Enema 500 every 6 hours      OTHER MEDS:  benzocaine 15 mG/menthol 3.6 mG (Sugar-Free) Lozenge 1 Lozenge Oral every 6 hours PRN  chlorhexidine 2% Cloths 1 Application(s) Topical <User Schedule>  cycloSPORINE  (SandIMMUNE) IVPB 15 milliGRAM(s) IV Intermittent every 24 hours  Eravacycline (Xerava) 75 milliGRAM(s),Sodium Chloride 0.9% 150 milliLiter(s) 75 milliGRAM(s) IV Intermittent every 12 hours  heparin   Injectable 5000 Unit(s) SubCutaneous every 8 hours  immune   globulin 10% (GAMMAGARD) IVPB 60 Gram(s) IV Intermittent every 24 hours  insulin lispro (HumaLOG) corrective regimen sliding scale   SubCutaneous every 8 hours  methylPREDNISolone sodium succinate Injectable 16 milliGRAM(s) IV Push <User Schedule>  multiple electrolytes Injection Type 1 1000 milliLiter(s) IV Continuous <Continuous>  pantoprazole  Injectable 40 milliGRAM(s) IV Push two times a day      Vital Signs Last 24 Hrs  T(C): 37 (31 Aug 2020 16:00), Max: 37 (31 Aug 2020 08:00)  T(F): 98.6 (31 Aug 2020 16:00), Max: 98.6 (31 Aug 2020 08:00)  HR: 110 (31 Aug 2020 17:00) (104 - 114)  BP: 115/78 (31 Aug 2020 16:00) (98/70 - 125/86)  BP(mean): 89 (31 Aug 2020 16:00) (77 - 101)  RR: 16 (31 Aug 2020 17:00) (10 - 77)  SpO2: 98% (31 Aug 2020 17:00) (93% - 99%)    EXAM:  Constitutional: Not in acute distress  Eyes: No icterus. Pupils b/l reactive to light.  Oral cavity: Clear, no lesions  Neck: No neck vein distension noted  RS: Chest clear to auscultation bilaterally. No wheeze/rhonchi/crepitations.  CVS: S1, S2 heard. Regular rate and rhythm. No murmurs/rubs/gallops.  Abdomen: distended, nontender, tense. no bowel sounds appreciated, No guarding  : No acute abnormalities  Extremities: Warm. 3+ b/l LE edema   Skin: No lesions noted  Vascular: No evidence of phlebitis  Neuro: Alert, oriented to time/place/person                        8.2    8.05  )-----------( 107      ( 30 Aug 2020 22:12 )             25.3       08-30    137  |  103  |  87<H>  ----------------------------<  133<H>  3.3<L>   |  21<L>  |  2.70<H>    Ca    7.8<L>      30 Aug 2020 22:12  Phos  5.1     08-30  Mg     3.6     08-30    TPro  6.6  /  Alb  2.4<L>  /  TBili  0.6  /  DBili  x   /  AST  33  /  ALT  17  /  AlkPhos  73  08-30          MICROBIOLOGY:Culture Results:   No Growth Final (08-25 @ 23:14)      RADIOLOGY:  < from: CT Abdomen and Pelvis No Cont (08.30.20 @ 15:06) >  FINDINGS:  Evaluation ofvascular channel solid organs is limited without intravenous contrast.  Mild motion artifact throughout the upper abdomen.    LOWER CHEST: Trace left and small right pleural effusions. Partially imaged Central venous catheter terminating in the distal SVC.    LIVER: Within normal limits.  BILE DUCTS: Normal caliber.  GALLBLADDER: Cholelithiasis.  SPLEEN: Within normal limits.  PANCREAS: Pancreas is atrophic. No evidence of main pancreatic ductal dilatation.  ADRENALS: Left adrenal adenoma, bettervisualized on prior CT from 8/28/2020.  KIDNEYS/URETERS: No hydronephrosis. A 4.0 cm left renal cyst.    BLADDER: Collapsed around a Rosas catheter.  REPRODUCTIVE ORGANS: Limited in evaluation secondary to streak artifact from left hip arthroplasty.    BOWEL: Enteric tube terminating within the stomach. The stomach is collapsed. No small bowel obstruction. Appendix is not visualized. Wall thickening of the entire colon, not significantly changed since 8/28/2020.  PERITONEUM: Unchanged small volume ascites in the abdomen and pelvis. No pneumoperitoneum.  VESSELS: Normal caliber abdominal aorta. Atherosclerotic changes of the distal abdominal aorta and bilateral common iliac arteries.  RETROPERITONEUM/LYMPH NODES: No lymphadenopathy.  ABDOMINALWALL: Soft tissue edema. Unchanged lipoma in the right lateral thigh. Unchanged right anterior lower abdominal soft tissue scarring.  BONES: Median sternotomy wires. Left hip arthroplasty. Degenerative changes of the spine.    IMPRESSION:  Pancolitisand small volume abdominopelvic ascites, not significantly changed since 8/20/2020.    Trace left and small right pleural effusions.

## 2020-08-31 NOTE — PROGRESS NOTE ADULT - SUBJECTIVE AND OBJECTIVE BOX
Subjective: He notes slight improvement in abdominal distention and discomfort. He is otherwise comfortable. He had a bowel movement this morning and in the evening.     Medications:  benzocaine 15 mG/menthol 3.6 mG (Sugar-Free) Lozenge 1 Lozenge Oral every 6 hours PRN  chlorhexidine 2% Cloths 1 Application(s) Topical <User Schedule>  cycloSPORINE  (SandIMMUNE) IVPB 15 milliGRAM(s) IV Intermittent every 24 hours  Eravacycline (Xerava) 75 milliGRAM(s),Sodium Chloride 0.9% 150 milliLiter(s) 75 milliGRAM(s) IV Intermittent every 12 hours  heparin   Injectable 5000 Unit(s) SubCutaneous every 8 hours  immune   globulin 10% (GAMMAGARD) IVPB 60 Gram(s) IV Intermittent every 24 hours  insulin lispro (HumaLOG) corrective regimen sliding scale   SubCutaneous every 8 hours  methylPREDNISolone sodium succinate Injectable 16 milliGRAM(s) IV Push <User Schedule>  metroNIDAZOLE  IVPB      metroNIDAZOLE  IVPB 500 milliGRAM(s) IV Intermittent every 8 hours  multiple electrolytes Injection Type 1 1000 milliLiter(s) IV Continuous <Continuous>  pantoprazole  Injectable 40 milliGRAM(s) IV Push two times a day  potassium chloride  10 mEq/50 mL IVPB 10 milliEquivalent(s) IV Intermittent once  vancomycin    Solution 500 milliGRAM(s) Oral every 6 hours  vancomycin  Retention Enema 500 milliGRAM(s) Rectal every 6 hours      Physical Exam:    Vital Signs Last 24 Hrs  T(C): 37 (31 Aug 2020 16:00), Max: 37 (31 Aug 2020 08:00)  T(F): 98.6 (31 Aug 2020 16:00), Max: 98.6 (31 Aug 2020 08:00)  HR: 110 (31 Aug 2020 20:00) (104 - 114)  BP: 125/78 (31 Aug 2020 20:00) (98/70 - 127/88)  BP(mean): 94 (31 Aug 2020 20:00) (77 - 101)  RR: 16 (31 Aug 2020 20:00) (10 - 77)  SpO2: 98% (31 Aug 2020 20:00) (93% - 99%)      Daily Weight in k.4 (31 Aug 2020 02:00)    I&O's Summary    30 Aug 2020 07:01  -  31 Aug 2020 07:00  --------------------------------------------------------  IN: 3860.4 mL / OUT: 1645 mL / NET: 2215.4 mL    31 Aug 2020 07:01  -  31 Aug 2020 20:55  --------------------------------------------------------  IN: 2643.3 mL / OUT: 990 mL / NET: 1653.3 mL        General: No distress. Comfortable.  HEENT: EOM intact. Cushingoid facies.   Neck: Neck supple. JVP not elevated. No masses  Chest: Clear to auscultation bilaterally  CV: Normal S1 and S2. No murmurs, rub, or gallops. Radial pulses normal. Edema in arms bilaterally. No lower extremity edema.   Abdomen: Distended and firm.  Mildy tender to palpation.  Skin: No rashes or skin breakdown  Neurology: Alert and oriented times three. Sensation intact  Psych: Affect normal    Labs:                        8.2    8.05  )-----------( 107      ( 30 Aug 2020 22:12 )             25.3         137  |  103  |  87<H>  ----------------------------<  133<H>  3.3<L>   |  21<L>  |  2.70<H>    Ca    7.8<L>      30 Aug 2020 22:12  Phos  5.1       Mg     3.6         TPro  6.6  /  Alb  2.4<L>  /  TBili  0.6  /  DBili  x   /  AST  33  /  ALT  17  /  AlkPhos  73          Cyclosporine Level: 82: CYCLOSPORINE: Assay performed by Chemiluminescent Microparticle  Immunoassay (CMIA) on the [employer] system.  All immunoassay tests  are subject to rare interferences from heterophile antibodies so that if  atypical or unexpected results are obtained the lab should be notified to  confirm this result by an alternative method before adjusting medication  dosage. The therapeutic range of 150-400 ng/mL is based on trough levels  of Cyclosporine but levels for clinical management will vary depending on  the organ transplanted, post-dose time of draw, length of time  post-transplant and the individual patient's metabolism. ng/mL (20 @ 10:03)      Everolimus, Whole Blood Result: 12.9: -------------------ADDITIONAL INFORMATION-------------------  Target steady-state trough concentrations vary depending on  the type of transplant, concomitant immunosuppression,  clinical/institutional protocols, and time post-transplant.  Results should be interpreted in conjunction with this  clinical information and any physical signs/symptoms of  rejection/toxicity.  Testing performed by Liquid Chromatography-Tandem Mass  Spectrometry (LC-MS/MS).  This test was developed and its performance characteristics  determined by [facility] in a manner consistent with CLIA  requirements. This test has not been cleared or approved by  the U.S. Food and Drug Administration.  Test Performed by:  [facility] [address] [place] [address]  : Marcus Caballero M.D. Ph.D.; CLIA# [id number] ng/mL (20 @ 13:51)

## 2020-08-31 NOTE — CHART NOTE - NSCHARTNOTEFT_GEN_A_CORE
Mr. Baez is followed by [facility] ([id number]).     He is a 70y M with history of heart transplant in 2/2018 admitted with autoimmune hemolytic anemia and dark stools requiring transfusion.  Patient found to have c. diff colitis and NORMAN. CT  of the abdomen performed 8/28 and repeated yesterday showed thickening of rectal mucosa likely 2/2 c. diff colitis. A rectal tube was placed by the [facility] around 4 PM and some drainage. Rectal vancomycin has been given twice overnight in addition to oral vancomycin. The rectal tube was removed.     Physical exam at 0445 AM:     ICU Vital Signs Last 24 Hrs  T(C): 36.5 (31 Aug 2020 04:00), Max: 36.6 (31 Aug 2020 00:00)  T(F): 97.7 (31 Aug 2020 04:00), Max: 97.9 (31 Aug 2020 00:00)  HR: 108 (31 Aug 2020 04:00) (104 - 113)  BP: 108/64 (31 Aug 2020 04:00) (85/56 - 115/69)  BP(mean): 82 (31 Aug 2020 04:00) (66 - 90)  RR: 16 (31 Aug 2020 04:00) (9 - 30)  SpO2: 95% (31 Aug 2020 04:00) (94% - 98%)    Gen: resting comfortably with NGT  abd: distended, non-tender to palpation, tympanic    LABS overnight:                         8.2    8.05  )-----------( 107      ( 30 Aug 2020 22:12 )             25.3       08-30    137  |  103  |  87<H>  ----------------------------<  133<H>  3.3<L>   |  21<L>  |  2.70<H>    Ca    7.8<L>      30 Aug 2020 22:12  Phos  5.1     08-30  Mg     3.6     08-30    TPro  6.6  /  Alb  2.4<L>  /  TBili  0.6  /  DBili  x   /  AST  33  /  ALT  17  /  AlkPhos  73  08-30    22:08 - VBG - pH: 7.32  | pCO2: 43    | pO2: 41    | Lactate: 0.7    16:56 - VBG - pH: 7.33  | pCO2: 42    | pO2: 35    | Lactate: 0.9          Plan:    - please continue rectal vancomycin.  - will continue serial abdominal exams and follow closely    Jace Greenberg [address] Surgery #[id number] Mr. Baez is followed by [place] [facility] ([id number]).     He is a 70y M with history of heart transplant in 2/2018 admitted with autoimmune hemolytic anemia and dark stools requiring transfusion.  Patient found to have c. diff colitis and NORMAN. CT  of the abdomen performed 8/28 and repeated yesterday showed thickening of rectal mucosa likely 2/2 c. diff colitis. A rectal tube was placed by the [facility] around 4 PM and some drainage. Rectal vancomycin has been given twice overnight in addition to oral vancomycin. The rectal tube was removed. He remains hemodynamically stable, no pressor requirement. His UOP is 1.4 L and the output from NGT is 50 ml.      Physical exam at 0445 AM:     ICU Vital Signs Last 24 Hrs  T(C): 36.5 (31 Aug 2020 04:00), Max: 36.6 (31 Aug 2020 00:00)  T(F): 97.7 (31 Aug 2020 04:00), Max: 97.9 (31 Aug 2020 00:00)  HR: 108 (31 Aug 2020 04:00) (104 - 113)  BP: 108/64 (31 Aug 2020 04:00) (85/56 - 115/69)  BP(mean): 82 (31 Aug 2020 04:00) (66 - 90)  RR: 16 (31 Aug 2020 04:00) (9 - 30)  SpO2: 95% (31 Aug 2020 04:00) (94% - 98%)      29 Aug 2020 07:01  -  30 Aug 2020 07:00  OUTPUT:     Indwelling Catheter - Urethral: 1425 mL    Nasoenteral Tube: 50 mL    Gen: resting comfortably with NGT  abd: distended, non-tender to palpation, tympanic    LABS overnight:                         8.2    8.05  )-----------( 107      ( 30 Aug 2020 22:12 )             25.3       08-30    137  |  103  |  87<H>  ----------------------------<  133<H>  3.3<L>   |  21<L>  |  2.70<H>    Ca    7.8<L>      30 Aug 2020 22:12  Phos  5.1     08-30  Mg     3.6     08-30    TPro  6.6  /  Alb  2.4<L>  /  TBili  0.6  /  DBili  x   /  AST  33  /  ALT  17  /  AlkPhos  73  08-30    22:08 - VBG - pH: 7.32  | pCO2: 43    | pO2: 41    | Lactate: 0.7    16:56 - VBG - pH: 7.33  | pCO2: 42    | pO2: 35    | Lactate: 0.9          Plan:    - please continue rectal vancomycin.  - will continue serial abdominal exams and follow closely    Jace Greenberg [address] Surgery #[id number]

## 2020-08-31 NOTE — PROGRESS NOTE ADULT - ASSESSMENT
70y old  Male with pmhx of  NICM s/p HM2 s/p OHT in 2018, autoimmune hemolytic anemia s/p steroids/rituxan, admitted for GIB c/b bacteremia, cdiff, and now NORMAN.    #NORMAN   - non oliguric NORMAN like multifactorial 2/2 Cdiff infection, everolimus   - His baseline Scr ~1mg/dl, peaked at 3 mg/dl on 8/25, now plateaued 2.7-3.0  - Urine studies consistent with CRS picture, could have ATN as well given the relative hypotension, lowest documented BP- 102/70. Last Everolimus trough was on 8/20 and noted to be elevated at ~10  - RHC on 8/25 with elevated pressures, given 2u pRBC and 40mg IV lasix with good response 8/25-26  - currently monitoring off diuretics with good UOP, continue monitor UOP.   - F/u surgery following for ileus and colitis   - F/u everolimus level, may need to adjust dose in setting of diarrhea/NORMAN  - Monitor UOP and daily weights. Dose medications as per eGFR<20      #Azotemia  - 2/2 NORMAN with use of diuretics and steroids, no evidence of uremia.     #Metabolic Acidosis  resolved   - continue to monitor    #Anemia  - 2/2 GIB, s/p 2 units on 8/25 with appropriate response  - Iron stores low, cannot give IV iron in setting of active infection  - pRBC transfusions per primary team  - No signs of active hemolysis

## 2020-08-31 NOTE — PHYSICAL THERAPY INITIAL EVALUATION ADULT - PERTINENT HX OF CURRENT PROBLEM, REHAB EVAL
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 Patient was treated w/ prednisone and Rituximab. Admitted  6/16-6/19 for hyperglycemia. Reported to transplant team having one dark black tarry stool-  instructed to  present to [facility] for hemolytic anemia. Found + C-diff, down trending H/H.

## 2020-08-31 NOTE — PROGRESS NOTE ADULT - SUBJECTIVE AND OBJECTIVE BOX
Subjective:   Patient seen at bedside this AM. Reports feeling well, without complaints. Denies chest pain, SOB. Bowel fxn +     24h Events:   - started on rectal vanc and IVIG  - Overnight, no acute events    Objective:  Vital Signs  T(C): 36.5 (08-31 @ 04:00), Max: 36.6 (08-31 @ 00:00)  HR: 114 (08-31 @ 06:00) (105 - 114)  BP: 112/73 (08-31 @ 06:00) (92/68 - 115/69)  RR: 26 (08-31 @ 06:00) (9 - 30)  SpO2: 93% (08-31 @ 06:00) (93% - 98%)  08-29-20 @ 07:01  -  08-30-20 @ 07:00  --------------------------------------------------------  IN: 1908.7 mL / OUT: 855 mL / NET: 1053.7 mL    08-30-20 @ 07:01  -  08-31-20 @ 06:36  --------------------------------------------------------  IN: 3738.3 mL / OUT: 1585 mL / NET: 2153.3 mL        Physical Exam:  GEN: resting in chair comfortably in NAD  RESP: no increased WOB  ABD: distended, non-tender to palpation, tympanic      Labs:                        8.2    8.05  )-----------( 107      ( 30 Aug 2020 22:12 )             25.3   08-30    137  |  103  |  87<H>  ----------------------------<  133<H>  3.3<L>   |  21<L>  |  2.70<H>    Ca    7.8<L>      30 Aug 2020 22:12  Phos  5.1     08-30  Mg     3.6     08-30    TPro  6.6  /  Alb  2.4<L>  /  TBili  0.6  /  DBili  x   /  AST  33  /  ALT  17  /  AlkPhos  73  08-30    CAPILLARY BLOOD GLUCOSE      POCT Blood Glucose.: 126 mg/dL (31 Aug 2020 05:05)  POCT Blood Glucose.: 144 mg/dL (30 Aug 2020 15:49)  POCT Blood Glucose.: 133 mg/dL (30 Aug 2020 09:09)      Medications:   MEDICATIONS  (STANDING):  chlorhexidine 2% Cloths 1 Application(s) Topical <User Schedule>  cycloSPORINE  (SandIMMUNE) IVPB 30 milliGRAM(s) IV Intermittent every 24 hours  Eravacycline (Xerava) 75 milliGRAM(s),Sodium Chloride 0.9% 150 milliLiter(s) 75 milliGRAM(s) IV Intermittent every 12 hours  heparin   Injectable 5000 Unit(s) SubCutaneous every 8 hours  immune   globulin 10% (GAMMAGARD) IVPB 60 Gram(s) IV Intermittent every 24 hours  insulin lispro (HumaLOG) corrective regimen sliding scale   SubCutaneous every 8 hours  methylPREDNISolone sodium succinate Injectable 16 milliGRAM(s) IV Push <User Schedule>  metroNIDAZOLE  IVPB      metroNIDAZOLE  IVPB 500 milliGRAM(s) IV Intermittent every 8 hours  multiple electrolytes Injection Type 1 1000 milliLiter(s) (120 mL/Hr) IV Continuous <Continuous>  pantoprazole  Injectable 40 milliGRAM(s) IV Push two times a day  vancomycin    Solution 500 milliGRAM(s) Oral every 6 hours  vancomycin  Retention Enema 500 milliGRAM(s) Rectal every 6 hours    MEDICATIONS  (PRN):  benzocaine 15 mG/menthol 3.6 mG (Sugar-Free) Lozenge 1 Lozenge Oral every 6 hours PRN Sore Throat      Imaging:  < from: CT Abdomen and Pelvis No Cont (08.30.20 @ 15:06) >  FINDINGS:  Evaluation ofvascular channel solid organs is limited without intravenous contrast.  Mild motion artifact throughout the upper abdomen.    LOWER CHEST: Trace left and small right pleural effusions. Partially imaged Central venous catheter terminating in the distal SVC.    LIVER: Within normal limits.  BILE DUCTS: Normal caliber.  GALLBLADDER: Cholelithiasis.  SPLEEN: Within normal limits.  PANCREAS: Pancreas is atrophic. No evidence of main pancreatic ductal dilatation.  ADRENALS: Left adrenal adenoma, bettervisualized on prior CT from 8/28/2020.  KIDNEYS/URETERS: No hydronephrosis. A 4.0 cm left renal cyst.    BLADDER: Collapsed around a Rosas catheter.  REPRODUCTIVE ORGANS: Limited in evaluation secondary to streak artifact from left hip arthroplasty.    BOWEL: Enteric tube terminating within the stomach. The stomach is collapsed. No small bowel obstruction. Appendix is not visualized. Wall thickening of the entire colon, not significantly changed since 8/28/2020.  PERITONEUM: Unchanged small volume ascites in the abdomen and pelvis. No pneumoperitoneum.  VESSELS: Normal caliber abdominal aorta. Atherosclerotic changes of the distal abdominal aorta and bilateral common iliac arteries.  RETROPERITONEUM/LYMPH NODES: No lymphadenopathy.  ABDOMINALWALL: Soft tissue edema. Unchanged lipoma in the right lateral thigh. Unchanged right anterior lower abdominal soft tissue scarring.  BONES: Median sternotomy wires. Left hip arthroplasty. Degenerative changes of the spine.    IMPRESSION:  Pancolitisand small volume abdominopelvic ascites, not significantly changed since 8/20/2020.    Trace left and small right pleural effusions.    < end of copied text >

## 2020-08-31 NOTE — PROGRESS NOTE ADULT - ATTENDING COMMENTS
I have seen and examined the patient. I agree with the above surgery resident's note.  feeling better  no abd pain, adequate uop  abd nontender  cont medical therapy for c.diff colitis

## 2020-08-31 NOTE — PROGRESS NOTE ADULT - ASSESSMENT
Assessment:   69 yo Male with c diff and abdominal distension    Plan:   - Serial abdominal exams  - no acute surgical intervention indicated at this time  - remainder of care per CTICU  - Will continue to follow      Lisa Snowden, PGY-1  [facility]  [phone]

## 2020-08-31 NOTE — PROGRESS NOTE ADULT - ATTENDING COMMENTS
70y old  Male with pmhx of  NICM s/p HM2 s/p OHT in 2018, autoimmune hemolytic anemia s/p steroids/rituxan, admitted for GIB c/b bacteremia, cdiff, and now NORMAN.  SCr plateuaing  UO good  But I>O  Hb 8.2

## 2020-08-31 NOTE — PROGRESS NOTE ADULT - PROBLEM SELECTOR PLAN 1
Will continue medications per ID guidance. Fecal transplant not currently an option.  He will have ongoing serial abdominal evaluations per surgery.

## 2020-08-31 NOTE — PROGRESS NOTE ADULT - SUBJECTIVE AND OBJECTIVE BOX
[facility] DIVISION OF KIDNEY DISEASES AND HYPERTENSION -- FOLLOW UP NOTE  --------------------------------------------------------------------------------  If any questions, please feel free to contact me  NS pager: [phone], [facility]: [phone]  Drew Vale M.D.  Nephrology Fellow    (After 5 pm or on weekends please page the on-call fellow)  --------------------------------------------------------------------------------    Chief Complaint:  Patient is a 70y old  Male who presents with a chief complaint of SOB/anemia (31 Aug 2020 09:49)    24 hour events/subjective:  Patient seen and examined at beside, UOP: 1.5L in the past 24hrs   BP controlled, sitting comfortably in chair w/o complaints  vitals/lab/medications reviewed- sCr  improved 3.1>> 2.7        PAST HISTORY  --------------------------------------------------------------------------------  No significant changes to PMH, PSH, FHx, SHx, unless otherwise noted    ALLERGIES & MEDICATIONS  --------------------------------------------------------------------------------  Allergies    No Known Allergies    Intolerances      Standing Inpatient Medications  chlorhexidine 2% Cloths 1 Application(s) Topical <User Schedule>  cycloSPORINE  (SandIMMUNE) IVPB 30 milliGRAM(s) IV Intermittent every 24 hours  Eravacycline (Xerava) 75 milliGRAM(s),Sodium Chloride 0.9% 150 milliLiter(s) 75 milliGRAM(s) IV Intermittent every 12 hours  heparin   Injectable 5000 Unit(s) SubCutaneous every 8 hours  immune   globulin 10% (GAMMAGARD) IVPB 60 Gram(s) IV Intermittent every 24 hours  insulin lispro (HumaLOG) corrective regimen sliding scale   SubCutaneous every 8 hours  methylPREDNISolone sodium succinate Injectable 16 milliGRAM(s) IV Push <User Schedule>  metroNIDAZOLE  IVPB      metroNIDAZOLE  IVPB 500 milliGRAM(s) IV Intermittent every 8 hours  multiple electrolytes Injection Type 1 1000 milliLiter(s) IV Continuous <Continuous>  pantoprazole  Injectable 40 milliGRAM(s) IV Push two times a day  vancomycin    Solution 500 milliGRAM(s) Oral every 6 hours  vancomycin  Retention Enema 500 milliGRAM(s) Rectal every 6 hours    PRN Inpatient Medications  benzocaine 15 mG/menthol 3.6 mG (Sugar-Free) Lozenge 1 Lozenge Oral every 6 hours PRN      REVIEW OF SYSTEMS  --------------------------------------------------------------------------------  Gen: No fevers/chills  Skin: No rashes  Head/Eyes/Ears: Normal hearing,   Respiratory: No dyspnea, cough  CV: No chest pain  GI: No abdominal pain, diarrhea  : No dysuria, hematuria  MSK: No  edema  Heme: No easy bruising or bleeding  Psych: No significant depression      All other systems were reviewed and are negative, except as noted.    VITALS/PHYSICAL EXAM  --------------------------------------------------------------------------------  PRACHI): 37 (08-31-20 @ 08:00), Max: 37 (08-31-20 @ 08:00)  HR: 106 (08-31-20 @ 09:00) (105 - 114)  BP: 104/68 (08-31-20 @ 09:00) (98/64 - 115/69)  RR: 20 (08-31-20 @ 09:00) (10 - 77)  SpO2: 98% (08-31-20 @ 09:00) (93% - 98%)  Wt(kg): --        08-30-20 @ 07:01  -  08-31-20 @ 07:00  --------------------------------------------------------  IN: 3860.4 mL / OUT: 1645 mL / NET: 2215.4 mL    08-31-20 @ 07:01  -  08-31-20 @ 10:46  --------------------------------------------------------  IN: 108.2 mL / OUT: 70 mL / NET: 38.2 mL        Physical Exam:  	Gen: NAD on nasal cannula  	HEENT: NGT in place  	Pulm: CTA B/l anteriorly  	CV: Tachycardic   	Abd: +BS, soft, NTND       	: + diaz catheter in place w/ urine  	MSK: Warm, bilateral lower ext edema  	Neuro: No focal deficits, AOX3, no asterixis   	Psych: Appropriate Affect  	Vascular Access: None      LABS/STUDIES  --------------------------------------------------------------------------------              8.2    8.05  >-----------<  107      [08-30-20 @ 22:12]              25.3     137  |  103  |  87  ----------------------------<  133      [08-30-20 @ 22:12]  3.3   |  21  |  2.70        Ca     7.8     [08-30-20 @ 22:12]      Mg     3.6     [08-30-20 @ 22:12]      Phos  5.1     [08-30-20 @ 22:12]    TPro  6.6  /  Alb  2.4  /  TBili  0.6  /  DBili  x   /  AST  33  /  ALT  17  /  AlkPhos  73  [08-30-20 @ 22:12]          Creatinine Trend:  SCr 2.70 [08-30 @ 22:12]  SCr 3.10 [08-30 @ 16:13]  SCr 3.17 [08-30 @ 01:19]  SCr 3.00 [08-29 @ 01:02]  SCr 2.86 [08-28 @ 01:18]    Urinalysis - [08-24-20 @ 21:27]      Color Yellow / Appearance Slightly Turbid / SG 1.018 / pH 5.5      Gluc Negative / Ketone Negative  / Bili Negative / Urobili <2 mg/dL       Blood Negative / Protein 30 mg/dL / Leuk Est Negative / Nitrite Negative      RBC 1 / WBC 3 / Hyaline 2 / Gran  / Sq Epi  / Non Sq Epi 2 / Bacteria Negative    Urine Creatinine 188      [08-24-20 @ 17:29]  Urine Sodium <35      [08-24-20 @ 17:29]  Urine Chloride <35      [08-24-20 @ 17:29]    Iron 36, TIBC 218, %sat 16      [08-15-20 @ 21:08]  Ferritin 764      [08-15-20 @ 21:12]  Vitamin D (25OH) 33.5      [04-30-20 @ 09:06]  HbA1c 4.7      [11-07-18 @ 23:18]  TSH 1.22      [08-12-20 @ 00:18]

## 2020-08-31 NOTE — CONSULT NOTE ADULT - SUBJECTIVE AND OBJECTIVE BOX
Behavioral Cardiology Psychological Assessment     History of present illness:     Social history: , lives in a private house he owns in [place]. His younger brother (Rivas Davey) lives with him. Pt’s wife   and his only son was killed 5 years ago. He has an 17 y/o granddaughter that lives nearby with her mother and just started college this week. Reports a close relationship with his siblings (6 brothers and 1 sister) all but one brother live in [place]. Identified his adopted "godbrother" (Nawaf Barahona [phone]) as primary support person and HCP. Identified a close friend (Tahira) who lives close by as additional support and alternate HCP; she is very involved in his care. His brother (Rivas Davey age 45) lives with him. Mother  (age 79); father . He has 2 daughters from a previous relationship but is not part of their lives. Worked as a  at [employer] for 40+ years; retired 2 years ago. On disability. Information obtained from patient and chart. Education: 10th Grade (limited literacy skills).     Substance use:   Tobacco: Former, quit 50 years ago, smoked 2 cigarettes/day for 2-3 years.   Alcohol: Former alcohol use; stopped 6 yrs ago; drank 4-5 drinks of rum 5 days/week for 40 yrs.   Drug: Past occasional marijuana use for many years ago; no other nonprescription drug use.         Mental status exam: Seen lying in bed in CTU. Cooperative, well related with good eye contact. Speech normal rate/volume. Thought process logical. No evidence of any psychosis, jona, delusions. Mood worried. Affect anxious. No s/i. Insight and judgment adequate.         Impression: Mr. Baez is a 70 year old man s/p OHT 2018 admitted with Behavioral Cardiology Psychological Assessment     History of present illness: Mr. Baez is a 70 year old man with history of dilated nonischemic cardiomyopathy, s/p OHT 2018, CKD III, HCV s/p treatment, and recently diagnosed autoimmune hemolytic anemia who is admitted with symptomatic anemia with Hgb of 6.6. Found to have chronic gastritis and small bowel ectasias. Has worsening abdominal distention with finding of ileus. CT scan with pancolitis. Started on IVIG , rectal vanc and Eravacylcine on .    Social history: , lives in a private house he owns in [place]. His younger brother (Rivas Davey) lives with him. Pt’s wife   and his only son was killed 5 years ago. He has an 17 y/o granddaughter that lives nearby with her mother and just started college this week. Reports a close relationship with his siblings (6 brothers and 1 sister) all but one brother live in [place]. Identified his adopted "godbrother" (Nawaf Barahona [phone]) as primary support person and HCP. Identified a close friend (Tahira) who lives close by as additional support and alternate HCP; she is very involved in his care. His brother (Rivas Davey age 45) lives with him. Mother  (age 79); father . He has 2 daughters from a previous relationship but is not part of their lives. Worked as a  at [employer] for 40+ years; retired 2 years ago. On disability. Information obtained from patient and chart. Education: 10th Grade (limited literacy skills).     Substance use:   Tobacco: Former, quit 50 years ago, smoked 2 cigarettes/day for 2-3 years.   Alcohol: Former alcohol use; stopped 6 yrs ago; drank 4-5 drinks of rum 5 days/week for 40 yrs.   Drug: Past occasional marijuana use for many years ago; no other nonprescription drug use.     Past psychiatric history: In past experienced depression related to bereavement issues following wife’s death () and son’s death (). Never sought treatment. Denies any other psychiatric issues. No history of s/a. No inpatient psychiatric admissions.     Psychological assessment: Endorsed symptoms of anxiety related to health issues. Concerned about his overall health status and fearful he may die. When his mother was about his age, he went to visit her in the hospital before she . Recalled "she was swollen just like me." Discussed his fears and provided support and validation. Encouraged him to speak with team to better understand his current health issues. Reports he likes talking to the nurses as they provide information in terms he can understand. Coping strategies include his [employer] ([employer]), thinking about his granddaughter. Denies symptoms of depression. Denies s/i. Denies panic attacks.     Mental status exam: Seen lying in bed in CTU. Cooperative, well related with good eye contact. Awake, oriented x3-4. Speech normal rate/volume. Thought process logical. No evidence of any psychosis, jona, delusions. Mood worried. Affect anxious. No s/i. Insight and judgment adequate.     Impression: Mr. Baez is a 70 year old man s/p OHT 2018 admitted with symptomatic anemia (Hgb 6.6) found to have chronic gastritis and small bowel ectasias; now with worsening abdominal distention with finding of ileus. CT scan with pancolitis. Started on IVIG , rectal vanc and Eravacylcine on . , lives in a private house he owns in [place]. Support from  younger brother (Rivas Davey) who lives with him and close friend Bekah. Endorsed symptoms of anxiety related to health issues. Concerned about his overall health status and fearful he may die. Receptive to support and validation. Encouraged him to speak with team to better understand his current health issues. Reports he likes talking to the nurses as they provide information in terms he can understand. Coping strategies include his [employer] ([employer]), thinking about his granddaughter. Denies symptoms of depression. Denies s/i. Denies panic attacks. Denies substance use.     Dx: Adjustment disorder; unspecified F43.20     Recommendations:   ·	Explain all information in simple, concise language with teach back to ensure understanding  ·	Include support system in all education   ·	Holistic RN  ·	Behavioral Cardiology will continue to follow

## 2020-08-31 NOTE — PROGRESS NOTE ADULT - ASSESSMENT
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 He has received a total of 3 units PRBC this admission with slow downtrend of hemoglobin. Labs were not consistent with hemolysis and he reported dark stools for which EGD/enteroscopy eventually revealed chronic gastritis and small bowel ectasias. He developed acute respiratory distress and profound sinus tachycardia on 8/13 in setting of Klebsiella bacteremia of unclear origin (preceded EGD) for which CT performed which suggests possible pneumonia. While his bacteremia was treated, he subsequently developed C diff colitis with severe abdominal distention. He has worsening acute renal failure, but is non-oliguric. He has some improvement in abdominal distention today.

## 2020-08-31 NOTE — PHYSICAL THERAPY INITIAL EVALUATION ADULT - PRECAUTIONS/LIMITATIONS, REHAB EVAL
fall precautions/Continues to have loose stools. Developed abd distention, CT Abd/Pelvis: +pancolitis.

## 2020-08-31 NOTE — PROGRESS NOTE ADULT - SUBJECTIVE AND OBJECTIVE BOX
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 Patient was treated w/ prednisone and Rituximab. Tacro was discontinued and patient was also transitioned to everolimus  Admitted  6/16-6/19  for hyperglycemia.  Reported to transplant team having one done black tarry stool-  instructed to  present to [facility] for hemolytic anemia. Patient has been following with Hematology outpatient.  Denies CP  N/V diarrhea SOB. (11 Aug 2020 20:08)      Surgery/Hospital Course:  8/11 Admitted to [facility] with symptomatic anemia  8/13 EGD for investigation of tarry stools  8/15 SB capsule: stomach & SB lesions, likely ulcers.  8/17 EGD/push enteroscopy w/ angioectasias/erosions in small bowel. Gastropathy and esophagitis. Ulcer seen on VCE most likely scope trauma.    8/18 VSS transferred back to floor.   8/20 VS 9.0/28.4 stable Gastric biopsy results LA Grade A esophagitis.  - Acute gastritis. Biopsies taken to r/o CMV, No ulceration seen despite finding on capsule endoscopy - likely 2/2 scope  trauma that has since resolved. Pathology pending.  8/21 VSS H/H  8.1/25.7  trending down will monitor prednisone taper 30 mg today. Procardia 120 initiated today RHC biopsy Monday.   8/22 VVS; Patient reports loose stools x 2-3 days with 1 episode today.  Stool sent for C-dif and GI PCR. Abdominal X-ray revealed: dilated loops of bowel. Abdomen distended.  Patient denies N/V. GI called to re-evaluate. Continue with current medication regimen.  Awaiting for upcoming cardiac biopsy on Monday 8/24.  8/23   vss    creat up to 2.28 ,  repeating CMP,  TTE ordered  Bladder scan   8/24   cardiac bx cancelled   elev creat  to 2.74   cardio renal cslt called,    + CDIF   inc vanco to 500 q6   ID following  8/25 VSS: RHC today as per transplant team; transfuse 2 units PRBC today as per Dr. Viramontes; continue vanco for cdiff; transfer patient to CTU after cath today   8/26. Dieretic challenge with good response   8/27: Downgraded to the floor then upgraded to the CTU again for concerning of abd distention   8/28 CT ABD & Pelvis reveals pancolitis  8/30: repeat CT scan of abd, gen surgery called to re-evaluated pt. pt a little more lethargic today but A&Ox3    Today:    REVIEW OF SYSTEMS:  Gen: No fever  EYES/ENT: No visual changes;  No vertigo or throat pain   NECK: No pain   RES:  No shortness of breath or Cough  CARD: No chest pain   GI: No abdominal pain  : No dysuria  NEURO: No weakness  SKIN: No itching, rashes     ICU Vital Signs Last 24 Hrs  T(C): 37 (31 Aug 2020 08:00), Max: 37 (31 Aug 2020 08:00)  T(F): 98.6 (31 Aug 2020 08:00), Max: 98.6 (31 Aug 2020 08:00)  HR: 106 (31 Aug 2020 09:00) (105 - 114)  BP: 104/68 (31 Aug 2020 09:00) (98/64 - 115/69)  BP(mean): 83 (31 Aug 2020 09:00) (76 - 90)  ABP: --  ABP(mean): --  RR: 20 (31 Aug 2020 09:00) (10 - 77)  SpO2: 98% (31 Aug 2020 09:00) (93% - 98%)      Physical Exam:  Gen:  Awake, alert   CNS: non focal 	  Neck: no JVD  RES : clear , no wheezing              CVS: Sinus Tachycardia. Normal S1/S2  Abd: firm, non-tender, distended. Bowel sounds absent. +NGT to LWS  Skin: No rash.  Ext:  +1 pedal edema    ============================I/O===========================   I&O's Detail    30 Aug 2020 07:01  -  31 Aug 2020 07:00  --------------------------------------------------------  IN:    Enteral Tube Flush: 40 mL    multiple electrolytes Injection Type 1: 2560 mL    Solution: 200 mL    Solution: 600 mL    Solution: 410 mL    Solution: 50.4 mL  Total IN: 3860.4 mL    OUT:    Indwelling Catheter - Urethral: 1545 mL    Nasoenteral Tube: 100 mL  Total OUT: 1645 mL    Total NET: 2215.4 mL      31 Aug 2020 07:01  -  31 Aug 2020 09:52  --------------------------------------------------------  IN:    Solution: 100 mL    Solution: 8.2 mL  Total IN: 108.2 mL    OUT:    Indwelling Catheter - Urethral: 70 mL  Total OUT: 70 mL    Total NET: 38.2 mL        ============================ LABS =========================                        8.2    8.05  )-----------( 107      ( 30 Aug 2020 22:12 )             25.3     08-30    137  |  103  |  87<H>  ----------------------------<  133<H>  3.3<L>   |  21<L>  |  2.70<H>    Ca    7.8<L>      30 Aug 2020 22:12  Phos  5.1     08-30  Mg     3.6     08-30    TPro  6.6  /  Alb  2.4<L>  /  TBili  0.6  /  DBili  x   /  AST  33  /  ALT  17  /  AlkPhos  73  08-30    LIVER FUNCTIONS - ( 30 Aug 2020 22:12 )  Alb: 2.4 g/dL / Pro: 6.6 g/dL / ALK PHOS: 73 U/L / ALT: 17 U/L / AST: 33 U/L / GGT: x                 ======================Micro/Rad/Cardio=================  Culture: Reviewed   CXR: Reviewed  Echo:Reviewed  ======================================================  PAST MEDICAL & SURGICAL HISTORY:  H/O hemolytic anemia  H/O autoimmune hemolytic anemia  Knee pain, right  HLD (hyperlipidemia)  Former smoker  DVT of upper extremity (deep vein thrombosis)  Hepatitis C virus  GIB (gastrointestinal bleeding)  Ventricular fibrillation: s/p AICD  PAF (paroxysmal atrial fibrillation): on xarelto  Non-Ischemic Cardiomyopathy  SVT (Supraventricular Tachycardia)  HTN  CHF (Congestive Heart Failure)  S/P right heart catheterization: biopsy multiple  H/O heart transplant: 2/2018  Status post left hip replacement  History of Prior Ablation Treatment: for afib  AICD (Automatic Cardioverter/Defibrillator) Present: [employer] with 1 [employer] lead4/1/09- explanted and replaced with Medtronic 2 leads on 9/2/09    ====================ASSESSMENT ==============  Heart transplant 2/2018 for ACC/AHA stage D NICM   Resolved GI Bleed, Melena s/p EGD  Acute respiratory failure, resolved  Supraventricular tachycardia  Severe hypertension  Hyperlactatemia acidosis, resolved  Leukopenia- resolved  Acute blood loss anemia, stable   CKD Stage III  C. diff diarrhea  Klebsiella oxytoca bacteremia  Sepsis  Azotemia 2/2 CKD and Steroids   Pancolitis    Plan:  ====================== NEUROLOGY=====================  Nonfocal, continue to monitor neuro status   Continue PT, out of bed to chair as tolerated     ==================== RESPIRATORY======================  Supplemental O2 via NC   Encourage incentive spirometry, continue pulse ox monitoring, follow ABGs     ====================CARDIOVASCULAR==================  Heart transplant 2/2018 for ACC/AHA stage D NICM, now on solumedrol from prednisone   (NPO for abd distention) off everolimus. off cellcept while on high dose steroids   Continue hemodynamic monitoring   ASA on hold for hx of GI bleed/ ulceration     methylPREDNISolone sodium succinate Injectable 16 milliGRAM(s) IV Push <User Schedule>    ===================HEMATOLOGIC/ONC ===================  Anemia s/p multiple blood transfusions  Monitor H&H and transfuse prn   Hx of GI bleed     VTE prophylaxis, heparin   Injectable 5000 Unit(s) SubCutaneous every 8 hours    ===================== RENAL =========================  NORMAN on CKD stage 3, Anasarca. lasix prn   Continue monitoring urine output via diaz, I&Os, BUN/Cr  Monitor and replete electrolytes prn     ==================== GASTROINTESTINAL===================  NPO in setting of abdominal distention   CT A/P on 8/30 with Pancolitisand small volume abdominopelvic ascites, not significantly changed since 8/20/2020.  General surgery following     multiple electrolytes Injection Type 1 1000 milliLiter(s) (120 mL/Hr) IV Continuous <Continuous>  GI prophylaxis, pantoprazole  Injectable 40 milliGRAM(s) IV Push two times a day    =======================    ENDOCRINE  =====================  Glucose control with humalog sliding scale for stress hyperglycemia     insulin lispro (HumaLOG) corrective regimen sliding scale   SubCutaneous every 8 hours    ========================INFECTIOUS DISEASE================  Klebsiella bacteremia from 8/13 which has since cleared  Clostridium difficile PCR positive, continue with vancomycin PO and metronidazole IV  Started on Rectal Vanco for worsening Cdiff presentation as per ID    Transplant prophylaxis with Posaconazole and Atovaquone on hold d/t ileus  Monitor Temp and WBC    metroNIDAZOLE  IVPB 500 milliGRAM(s) IV Intermittent every 8 hours  vancomycin    Solution 500 milliGRAM(s) Oral every 6 hours  vancomycin  Retention Enema 500 milliGRAM(s) Rectal every 6 hours      Patient requires continuous monitoring with bedside rhythm monitoring, pulse ox monitoring, and intermittent blood gas analysis. Care plan discussed with ICU care team. Patient remained critical and at risk for life threatening decompensation.     By signing my name below, I, Ronit Joe, attest that this documentation has been prepared under the direction and in the presence of SHELLY Ennis   Electronically signed: Katty Gaffney, 08-31-20 @ 09:52    I, Elijah Landry, personally performed the services described in this documentation. all medical record entries made by the ramuibkurt were at my direction and in my presence. 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 Patient was treated w/ prednisone and Rituximab. Tacro was discontinued and patient was also transitioned to everolimus  Admitted  6/16-6/19  for hyperglycemia.  Reported to transplant team having one done black tarry stool-  instructed to  present to [facility] for hemolytic anemia. Patient has been following with Hematology outpatient.  Denies CP  N/V diarrhea SOB. (11 Aug 2020 20:08)    Surgery/Hospital Course:  8/11 Admitted to [facility] with symptomatic anemia  8/13 EGD for investigation of tarry stools  8/15 SB capsule: stomach & SB lesions, likely ulcers.  8/17 EGD/push enteroscopy w/ angioectasias/erosions in small bowel. Gastropathy and esophagitis. Ulcer seen on VCE most likely scope trauma.    8/18 VSS transferred back to floor.   8/20 VS 9.0/28.4 stable Gastric biopsy results LA Grade A esophagitis.  - Acute gastritis. Biopsies taken to r/o CMV, No ulceration seen despite finding on capsule endoscopy - likely 2/2 scope  trauma that has since resolved. Pathology pending.  8/21 VSS H/H  8.1/25.7  trending down will monitor prednisone taper 30 mg today. Procardia 120 initiated today RHC biopsy Monday.   8/22 VVS; Patient reports loose stools x 2-3 days with 1 episode today.  Stool sent for C-dif and GI PCR. Abdominal X-ray revealed: dilated loops of bowel. Abdomen distended.  Patient denies N/V. GI called to re-evaluate. Continue with current medication regimen.  Awaiting for upcoming cardiac biopsy on Monday 8/24.  8/23   vss    creat up to 2.28 ,  repeating CMP,  TTE ordered  Bladder scan   8/24   cardiac bx cancelled   elev creat  to 2.74   cardio renal cslt called,    + CDIF   inc vanco to 500 q6   ID following  8/25 VSS: RHC today as per transplant team; transfuse 2 units PRBC today as per Dr. Viramontes; continue vanco for cdiff; transfer patient to CTU after cath today   8/26. Dieretic challenge with good response   8/27: Downgraded to the floor then upgraded to the CTU again for concerning of abd distention   8/28 CT ABD & Pelvis reveals pancolitis  8/30: repeat CT scan of abd, gen surgery called to re-evaluated pt. pt a little more lethargic today but A&Ox3    Today: Continuing to perform serial abdominal exams. No acute surgical intervention at this time as per Gen surg. Vanco by rectum and PO for C diff colitis. Continue maintenance fluids @ 120/hr.     REVIEW OF SYSTEMS:  Gen: No fever  EYES/ENT: No visual changes;  No vertigo or throat pain   NECK: No pain   RES:  No shortness of breath or Cough  CARD: No chest pain   GI: No abdominal pain  : No dysuria  NEURO: No weakness  SKIN: No itching, rashes     ICU Vital Signs Last 24 Hrs  T(C): 37 (31 Aug 2020 08:00), Max: 37 (31 Aug 2020 08:00)  T(F): 98.6 (31 Aug 2020 08:00), Max: 98.6 (31 Aug 2020 08:00)  HR: 106 (31 Aug 2020 09:00) (105 - 114)  BP: 104/68 (31 Aug 2020 09:00) (98/64 - 115/69)  BP(mean): 83 (31 Aug 2020 09:00) (76 - 90)  ABP: --  ABP(mean): --  RR: 20 (31 Aug 2020 09:00) (10 - 77)  SpO2: 98% (31 Aug 2020 09:00) (93% - 98%)      Physical Exam:  Gen:  Awake, alert   CNS: non focal 	  Neck: no JVD  RES : clear , no wheezing              CVS: Sinus Tachycardia. Normal S1/S2  Abd: firm, non-tender, distended. Bowel sounds absent. +NGT to LWS  Skin: No rash.  Ext:  +1 pedal edema    ============================I/O===========================   I&O's Detail    30 Aug 2020 07:01  -  31 Aug 2020 07:00  --------------------------------------------------------  IN:    Enteral Tube Flush: 40 mL    multiple electrolytes Injection Type 1: 2560 mL    Solution: 200 mL    Solution: 600 mL    Solution: 410 mL    Solution: 50.4 mL  Total IN: 3860.4 mL    OUT:    Indwelling Catheter - Urethral: 1545 mL    Nasoenteral Tube: 100 mL  Total OUT: 1645 mL    Total NET: 2215.4 mL      31 Aug 2020 07:01  -  31 Aug 2020 09:52  --------------------------------------------------------  IN:    Solution: 100 mL    Solution: 8.2 mL  Total IN: 108.2 mL    OUT:    Indwelling Catheter - Urethral: 70 mL  Total OUT: 70 mL    Total NET: 38.2 mL    ============================ LABS =========================                        8.2    8.05  )-----------( 107      ( 30 Aug 2020 22:12 )             25.3     08-30    137  |  103  |  87<H>  ----------------------------<  133<H>  3.3<L>   |  21<L>  |  2.70<H>    Ca    7.8<L>      30 Aug 2020 22:12  Phos  5.1     08-30  Mg     3.6     08-30    TPro  6.6  /  Alb  2.4<L>  /  TBili  0.6  /  DBili  x   /  AST  33  /  ALT  17  /  AlkPhos  73  08-30    LIVER FUNCTIONS - ( 30 Aug 2020 22:12 )  Alb: 2.4 g/dL / Pro: 6.6 g/dL / ALK PHOS: 73 U/L / ALT: 17 U/L / AST: 33 U/L / GGT: x           ======================Micro/Rad/Cardio=================  Culture: Reviewed   CXR: Reviewed  Echo:Reviewed  ======================================================  PAST MEDICAL & SURGICAL HISTORY:  H/O hemolytic anemia  H/O autoimmune hemolytic anemia  Knee pain, right  HLD (hyperlipidemia)  Former smoker  DVT of upper extremity (deep vein thrombosis)  Hepatitis C virus  GIB (gastrointestinal bleeding)  Ventricular fibrillation: s/p AICD  PAF (paroxysmal atrial fibrillation): on xarelto  Non-Ischemic Cardiomyopathy  SVT (Supraventricular Tachycardia)  HTN  CHF (Congestive Heart Failure)  S/P right heart catheterization: biopsy multiple  H/O heart transplant: 2/2018  Status post left hip replacement  History of Prior Ablation Treatment: for afib  AICD (Automatic Cardioverter/Defibrillator) Present: [employer] with 1 [employer] lead4/1/09- explanted and replaced with Medtronic 2 leads on 9/2/09    ====================ASSESSMENT ==============  Heart transplant 2/2018 for ACC/AHA stage D NICM   Resolved GI Bleed, Melena s/p EGD  Acute respiratory failure, resolved  Supraventricular tachycardia  Severe hypertension  Hyperlactatemia acidosis, resolved  Leukopenia- resolved  Acute blood loss anemia, stable   CKD Stage III  C. diff diarrhea  Klebsiella oxytoca bacteremia  Sepsis  Azotemia 2/2 CKD and Steroids   Pancolitis    Plan:  ====================== NEUROLOGY=====================  Nonfocal, continue to monitor neuro status   Continue PT, out of bed to chair as tolerated     ==================== RESPIRATORY======================  Supplemental O2 via NC   Encourage incentive spirometry, continue pulse ox monitoring, follow ABGs     ====================CARDIOVASCULAR==================  Heart transplant 2/2018 for ACC/AHA stage D NICM, now on solumedrol from prednisone   (NPO for abd distention) off everolimus. off cellcept while on high dose steroids   Continue hemodynamic monitoring   ASA on hold for hx of GI bleed/ ulceration     methylPREDNISolone sodium succinate Injectable 16 milliGRAM(s) IV Push <User Schedule>    ===================HEMATOLOGIC/ONC ===================  Anemia s/p multiple blood transfusions  Monitor H&H and transfuse prn   Hx of GI bleed     VTE prophylaxis, heparin   Injectable 5000 Unit(s) SubCutaneous every 8 hours    ===================== RENAL =========================  NORMAN on CKD stage 3, Anasarca. lasix prn   Continue monitoring urine output via diaz, I&Os, BUN/Cr  Monitor and replete electrolytes prn     ==================== GASTROINTESTINAL===================  NPO in setting of abdominal distention   CT A/P on 8/30 with Pancolitisand small volume abdominopelvic ascites, not significantly changed since 8/20/2020.  General surgery following-continue serial abdominal exams.     multiple electrolytes Injection Type 1 1000 milliLiter(s) (120 mL/Hr) IV Continuous <Continuous>  GI prophylaxis, pantoprazole  Injectable 40 milliGRAM(s) IV Push two times a day    =======================    ENDOCRINE  =====================  Glucose control with humalog sliding scale for stress hyperglycemia     insulin lispro (HumaLOG) corrective regimen sliding scale   SubCutaneous every 8 hours    ========================INFECTIOUS DISEASE================  Klebsiella bacteremia from 8/13 which has since cleared  Clostridium difficile PCR positive, continue with vancomycin PO and metronidazole IV  Started on Rectal Vanco for worsening Cdiff presentation as per ID    Transplant prophylaxis with Posaconazole and Atovaquone on hold d/t ileus  Monitor Temp and WBC    metroNIDAZOLE  IVPB 500 milliGRAM(s) IV Intermittent every 8 hours  vancomycin    Solution 500 milliGRAM(s) Oral every 6 hours  vancomycin  Retention Enema 500 milliGRAM(s) Rectal every 6 hours      Patient requires continuous monitoring with bedside rhythm monitoring, pulse ox monitoring, and intermittent blood gas analysis. Care plan discussed with ICU care team. Patient remained critical and at risk for life threatening decompensation.     By signing my name below, I, Ronit Joe, attest that this documentation has been prepared under the direction and in the presence of SHELLY Ennis   Electronically signed: Ab Gaffney, 08-31-20 @ 09:52    I, Elijah Landry, personally performed the services described in this documentation. all medical record entries made by the ab were at my direction and in my presence. I have reviewed the chart and agree that the record reflects my personal performance and is accurate and complete  Electronically signed: SHELLY Ennis

## 2020-08-31 NOTE — PROGRESS NOTE ADULT - ATTENDING COMMENTS
Patient seen and examined with Dr. Infante    I agree with his interval history exam and plans as noted above  Patient with sever C.diff pan colitis, and ileus throughout small and large bowel but without toxic megacolon appearance on Xrays  Abdominal exam seems slightly improved today    He is receiving oral Vancomycin 500mgqid as well as rectal Vancomycin qid through enemas  IV flagyl  IV eravacycline  IVIG therapy x 5days  We are unable to obtain FMT material through [facility] because they remain shuttered during the pandemic- FMT would carry an increased risk in an immunocompromised patient    will continue supportive care  General Surgery is following    Gary Lujan MD  [phone]  After 5pm/weekends [phone]

## 2020-08-31 NOTE — PHYSICAL THERAPY INITIAL EVALUATION ADULT - ADDITIONAL COMMENTS
Pt reports living with brother in apartment. Pt reports living with brother in apartment. Says total of 10 steps to enter (3 then 7). Prior to admission pt independent with straight cane for community ambulation. Independent with transfers and ADLs.

## 2020-08-31 NOTE — PHYSICAL THERAPY INITIAL EVALUATION ADULT - PLANNED THERAPY INTERVENTIONS, PT EVAL
transfer training/LTG 1: Stairs - Pt will be independent with negotiation of 10 steps w/ unilateral handrail within 4 weeks./gait training/balance training/bed mobility training

## 2020-09-01 LAB
ALBUMIN SERPL ELPH-MCNC: 2.3 G/DL — LOW (ref 3.3–5)
ALP SERPL-CCNC: 79 U/L — SIGNIFICANT CHANGE UP (ref 40–120)
ALT FLD-CCNC: 21 U/L — SIGNIFICANT CHANGE UP (ref 10–45)
ANION GAP SERPL CALC-SCNC: 12 MMOL/L — SIGNIFICANT CHANGE UP (ref 5–17)
AST SERPL-CCNC: 33 U/L — SIGNIFICANT CHANGE UP (ref 10–40)
BILIRUB SERPL-MCNC: 0.6 MG/DL — SIGNIFICANT CHANGE UP (ref 0.2–1.2)
BUN SERPL-MCNC: 85 MG/DL — HIGH (ref 7–23)
CALCIUM SERPL-MCNC: 7.7 MG/DL — LOW (ref 8.4–10.5)
CHLORIDE SERPL-SCNC: 109 MMOL/L — HIGH (ref 96–108)
CO2 SERPL-SCNC: 22 MMOL/L — SIGNIFICANT CHANGE UP (ref 22–31)
CREAT SERPL-MCNC: 2.24 MG/DL — HIGH (ref 0.5–1.3)
CYCLOSPORINE SER-MCNC: 46 NG/ML — LOW (ref 150–400)
EVEROLIMUS, WHOLE BLOOD RESULT: 5.3 NG/ML — SIGNIFICANT CHANGE UP (ref 3–?)
GLUCOSE BLDC GLUCOMTR-MCNC: 113 MG/DL — HIGH (ref 70–99)
GLUCOSE BLDC GLUCOMTR-MCNC: 131 MG/DL — HIGH (ref 70–99)
GLUCOSE BLDC GLUCOMTR-MCNC: 137 MG/DL — HIGH (ref 70–99)
GLUCOSE SERPL-MCNC: 136 MG/DL — HIGH (ref 70–99)
HCT VFR BLD CALC: 26.2 % — LOW (ref 39–50)
HGB BLD-MCNC: 8.4 G/DL — LOW (ref 13–17)
MAGNESIUM SERPL-MCNC: 3.3 MG/DL — HIGH (ref 1.6–2.6)
MCHC RBC-ENTMCNC: 29.7 PG — SIGNIFICANT CHANGE UP (ref 27–34)
MCHC RBC-ENTMCNC: 32.1 GM/DL — SIGNIFICANT CHANGE UP (ref 32–36)
MCV RBC AUTO: 92.6 FL — SIGNIFICANT CHANGE UP (ref 80–100)
NRBC # BLD: 0 /100 WBCS — SIGNIFICANT CHANGE UP (ref 0–0)
PHOSPHATE SERPL-MCNC: 3.9 MG/DL — SIGNIFICANT CHANGE UP (ref 2.5–4.5)
PLATELET # BLD AUTO: 121 K/UL — LOW (ref 150–400)
POTASSIUM SERPL-MCNC: 3.6 MMOL/L — SIGNIFICANT CHANGE UP (ref 3.5–5.3)
POTASSIUM SERPL-SCNC: 3.6 MMOL/L — SIGNIFICANT CHANGE UP (ref 3.5–5.3)
PROT SERPL-MCNC: 6.8 G/DL — SIGNIFICANT CHANGE UP (ref 6–8.3)
RBC # BLD: 2.83 M/UL — LOW (ref 4.2–5.8)
RBC # FLD: 20.2 % — HIGH (ref 10.3–14.5)
SARS-COV-2 RNA SPEC QL NAA+PROBE: SIGNIFICANT CHANGE UP
SODIUM SERPL-SCNC: 143 MMOL/L — SIGNIFICANT CHANGE UP (ref 135–145)
WBC # BLD: 8.68 K/UL — SIGNIFICANT CHANGE UP (ref 3.8–10.5)
WBC # FLD AUTO: 8.68 K/UL — SIGNIFICANT CHANGE UP (ref 3.8–10.5)

## 2020-09-01 PROCEDURE — 71045 X-RAY EXAM CHEST 1 VIEW: CPT | Mod: 26

## 2020-09-01 PROCEDURE — 99232 SBSQ HOSP IP/OBS MODERATE 35: CPT | Mod: GC

## 2020-09-01 PROCEDURE — 99291 CRITICAL CARE FIRST HOUR: CPT

## 2020-09-01 PROCEDURE — 99233 SBSQ HOSP IP/OBS HIGH 50: CPT | Mod: GC

## 2020-09-01 RX ORDER — FUROSEMIDE 40 MG
80 TABLET ORAL ONCE
Refills: 0 | Status: COMPLETED | OUTPATIENT
Start: 2020-09-01 | End: 2020-09-01

## 2020-09-01 RX ADMIN — Medication 500 MILLIGRAM(S): at 20:55

## 2020-09-01 RX ADMIN — Medication 500 MILLIGRAM(S): at 08:29

## 2020-09-01 RX ADMIN — Medication 16 MILLIGRAM(S): at 08:30

## 2020-09-01 RX ADMIN — Medication 500 MILLIGRAM(S): at 05:55

## 2020-09-01 RX ADMIN — Medication 100 MILLIGRAM(S): at 08:29

## 2020-09-01 RX ADMIN — Medication 100 MILLIGRAM(S): at 01:56

## 2020-09-01 RX ADMIN — CYCLOSPORINE 2.1 MILLIGRAM(S): 100 CAPSULE ORAL at 14:07

## 2020-09-01 RX ADMIN — HEPARIN SODIUM 5000 UNIT(S): 5000 INJECTION INTRAVENOUS; SUBCUTANEOUS at 23:20

## 2020-09-01 RX ADMIN — SODIUM CHLORIDE 120 MILLILITER(S): 9 INJECTION, SOLUTION INTRAVENOUS at 15:56

## 2020-09-01 RX ADMIN — Medication 80 MILLIGRAM(S): at 18:59

## 2020-09-01 RX ADMIN — PANTOPRAZOLE SODIUM 40 MILLIGRAM(S): 20 TABLET, DELAYED RELEASE ORAL at 05:54

## 2020-09-01 RX ADMIN — Medication 500 MILLIGRAM(S): at 01:56

## 2020-09-01 RX ADMIN — PANTOPRAZOLE SODIUM 40 MILLIGRAM(S): 20 TABLET, DELAYED RELEASE ORAL at 18:15

## 2020-09-01 RX ADMIN — CHLORHEXIDINE GLUCONATE 1 APPLICATION(S): 213 SOLUTION TOPICAL at 05:54

## 2020-09-01 RX ADMIN — Medication 500 MILLIGRAM(S): at 13:43

## 2020-09-01 RX ADMIN — HEPARIN SODIUM 5000 UNIT(S): 5000 INJECTION INTRAVENOUS; SUBCUTANEOUS at 05:55

## 2020-09-01 RX ADMIN — IMMUNE GLOBULIN (HUMAN) 75 GRAM(S): 10 INJECTION INTRAVENOUS; SUBCUTANEOUS at 08:11

## 2020-09-01 RX ADMIN — Medication 500 MILLIGRAM(S): at 20:54

## 2020-09-01 NOTE — CHART NOTE - TREATMENT: THE FOLLOWING DIET HAS BEEN RECOMMENDED
Criteria: NPO x6 days 2/2 altered GI function. Worsening edema.     Recommend: Consider alternate means of nutrition support.

## 2020-09-01 NOTE — PROGRESS NOTE ADULT - SUBJECTIVE AND OBJECTIVE BOX
ID Follow Up      Interval History/ROS:Patient is a 70y old  Male who presents with a chief complaint of SOB/anemia (01 Sep 2020 16:22)    He had 1 soft BM this AM. Denies fevers, chills, nausea. Currently NPO. Continues on multimodal regimen for Cdiff colitis.     Allergies  No Known Allergies    Intolerances  None      ANTIMICROBIALS:  metroNIDAZOLE  IVPB    metroNIDAZOLE  IVPB 500 every 8 hours  vancomycin    Solution 500 every 6 hours  vancomycin  Retention Enema 500 every 6 hours      OTHER MEDS:  benzocaine 15 mG/menthol 3.6 mG (Sugar-Free) Lozenge 1 Lozenge Oral every 6 hours PRN  chlorhexidine 2% Cloths 1 Application(s) Topical <User Schedule>  cycloSPORINE  (SandIMMUNE) IVPB 15 milliGRAM(s) IV Intermittent every 24 hours  Eravacycline (Xerava) 75 milliGRAM(s),Sodium Chloride 0.9% 150 milliLiter(s) 75 milliGRAM(s) IV Intermittent every 12 hours  heparin   Injectable 5000 Unit(s) SubCutaneous every 8 hours  immune   globulin 10% (GAMMAGARD) IVPB 60 Gram(s) IV Intermittent every 24 hours  insulin lispro (HumaLOG) corrective regimen sliding scale   SubCutaneous every 8 hours  methylPREDNISolone sodium succinate Injectable 16 milliGRAM(s) IV Push <User Schedule>  multiple electrolytes Injection Type 1 1000 milliLiter(s) IV Continuous <Continuous>  pantoprazole  Injectable 40 milliGRAM(s) IV Push two times a day      Vital Signs Last 24 Hrs  T(C): 36.5 (01 Sep 2020 16:00), Max: 36.5 (01 Sep 2020 16:00)  T(F): 97.7 (01 Sep 2020 16:00), Max: 97.7 (01 Sep 2020 16:00)  HR: 110 (01 Sep 2020 16:00) (100 - 115)  BP: 125/91 (01 Sep 2020 16:00) (115/92 - 137/76)  BP(mean): 105 (01 Sep 2020 16:00) (90 - 105)  RR: 11 (01 Sep 2020 16:00) (10 - 45)  SpO2: 98% (01 Sep 2020 16:00) (93% - 100%)    EXAM:  Constitutional: Not in acute distress  Eyes: No icterus. Pupils b/l reactive to light.  Oral cavity: Clear, no lesions. NGT in place  Neck: No neck vein distension noted  RS: diminished breath sounds bilaterally, No wheeze/rhonchi/crepitations.  CVS: S1, S2 heard. Regular rate and rhythm. No murmurs/rubs/gallops.  Abdomen: distended, nontender, no Bowel sounds. No guarding/rigidity  : No acute abnormalities  Extremities: 3+ swelling in LEs   Skin: No lesions noted  Vascular: No evidence of phlebitis  Neuro: Alert, oriented to time/place/person    Labs:                        8.4    8.68  )-----------( 121      ( 01 Sep 2020 02:11 )             26.2       09-01    143  |  109<H>  |  85<H>  ----------------------------<  136<H>  3.6   |  22  |  2.24<H>    Ca    7.7<L>      01 Sep 2020 02:11  Phos  3.9     09-01  Mg     3.3     09-01    TPro  6.8  /  Alb  2.3<L>  /  TBili  0.6  /  DBili  x   /  AST  33  /  ALT  21  /  AlkPhos  79  09-01          MICROBIOLOGY:Culture Results:   No Growth Final (08-25 @ 23:14)      RADIOLOGY:  < from: [facility] Duplex Upper Ext Vein Scan, Right (08.31.20 @ 16:18) >  IMPRESSION:  No evidence of right upper extremity deep venous thrombosis.    OTHER INVESTIGATIONS:

## 2020-09-01 NOTE — PROGRESS NOTE ADULT - ASSESSMENT
Assessment:   70y M with history of heart transplant in 2/2018 admitted with autoimmune hemolytic anemia and dark stools requiring transfusion. Found to have c diff and abdominal distension on 8/23.    Plan:   - Serial abdominal exams  - no acute surgical intervention indicated at this time  - agree with abx therapy  - remainder of care per CTICU  - Will continue to follow    Willy Erickson, PGY-1  [facility]  [id number]

## 2020-09-01 NOTE — PROGRESS NOTE ADULT - SUBJECTIVE AND OBJECTIVE BOX
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 Patient was treated w/ prednisone and Rituximab. Tacro was discontinued and patient was also transitioned to everolimus  Admitted  6/16-6/19  for hyperglycemia.  Reported to transplant team having one done black tarry stool-  instructed to  present to [facility] for hemolytic anemia. Patient has been following with Hematology outpatient.  Denies CP  N/V diarrhea SOB. (11 Aug 2020 20:08)      Surgery/Hospital Course:  8/11 Admitted to [facility] with symptomatic anemia  8/13 EGD for investigation of tarry stools  8/15 SB capsule: stomach & SB lesions, likely ulcers.  8/17 EGD/push enteroscopy w/ angioectasias/erosions in small bowel. Gastropathy and esophagitis. Ulcer seen on VCE most likely scope trauma.    8/18 VSS transferred back to floor.   8/20 VS 9.0/28.4 stable Gastric biopsy results LA Grade A esophagitis.  - Acute gastritis. Biopsies taken to r/o CMV, No ulceration seen despite finding on capsule endoscopy - likely 2/2 scope  trauma that has since resolved. Pathology pending.  8/21 VSS H/H  8.1/25.7  trending down will monitor prednisone taper 30 mg today. Procardia 120 initiated today RHC biopsy Monday.   8/22 VVS; Patient reports loose stools x 2-3 days with 1 episode today.  Stool sent for C-dif and GI PCR. Abdominal X-ray revealed: dilated loops of bowel. Abdomen distended.  Patient denies N/V. GI called to re-evaluate. Continue with current medication regimen.  Awaiting for upcoming cardiac biopsy on Monday 8/24.  8/23   vss    creat up to 2.28 ,  repeating CMP,  TTE ordered  Bladder scan   8/24   cardiac bx cancelled   elev creat  to 2.74   cardio renal cslt called,    + CDIF   inc vanco to 500 q6   ID following  8/25 VSS: RHC today as per transplant team; transfuse 2 units PRBC today as per Dr. Viramontes; continue vanco for cdiff; transfer patient to CTU after cath today   8/26. Dieretic challenge with good response   8/27: Downgraded to the floor then upgraded to the CTU again for concerning of abd distention   8/28 CT ABD & Pelvis reveals pancolitis  8/30: repeat CT scan of abd, gen surgery called to re-evaluated pt. pt a little more lethargic today but A&Ox3    Today:    REVIEW OF SYSTEMS:  Gen: No fever  EYES/ENT: No visual changes;  No vertigo or throat pain   NECK: No pain   RES:  No shortness of breath or Cough  CARD: No chest pain   GI: No abdominal pain  : No dysuria  NEURO: No weakness  SKIN: No itching, rashes     ICU Vital Signs Last 24 Hrs  T(C): 36.4 (01 Sep 2020 08:00), Max: 37 (31 Aug 2020 16:00)  T(F): 97.6 (01 Sep 2020 08:00), Max: 98.6 (31 Aug 2020 16:00)  HR: 100 (01 Sep 2020 09:12) (100 - 115)  BP: 133/80 (01 Sep 2020 09:12) (110/64 - 133/80)  BP(mean): 101 (01 Sep 2020 09:12) (83 - 103)  ABP: --  ABP(mean): --  RR: 38 (01 Sep 2020 09:12) (10 - 38)  SpO2: 97% (01 Sep 2020 09:12) (93% - 100%)      Physical Exam:  Gen:  Awake, alert   CNS: non focal 	  Neck: no JVD  RES : clear , no wheezing              CVS: Sinus Tachycardia. Normal S1/S2  Abd: firm, non-tender, distended. Bowel sounds absent. +NGT to LWS  Skin: No rash.  Ext:  +1 pedal edema    ============================I/O===========================   I&O's Detail    31 Aug 2020 07:01  -  01 Sep 2020 07:00  --------------------------------------------------------  IN:    multiple electrolytes Injection Type 1: 2880 mL    Solution: 66.4 mL    Solution: 640 mL    Solution: 300 mL    Solution: 350 mL  Total IN: 4236.4 mL    OUT:    Indwelling Catheter - Urethral: 1890 mL    Nasoenteral Tube: 100 mL  Total OUT: 1990 mL    Total NET: 2246.4 mL      01 Sep 2020 07:01  -  01 Sep 2020 09:52  --------------------------------------------------------  IN:    multiple electrolytes Injection Type 1: 120 mL    Solution: 100 mL    Solution: 150 mL    Solution: 4.2 mL  Total IN: 374.2 mL    OUT:    Indwelling Catheter - Urethral: 55 mL  Total OUT: 55 mL    Total NET: 319.2 mL        ============================ LABS =========================                        8.4    8.68  )-----------( 121      ( 01 Sep 2020 02:11 )             26.2     09-01    143  |  109<H>  |  85<H>  ----------------------------<  136<H>  3.6   |  22  |  2.24<H>    Ca    7.7<L>      01 Sep 2020 02:11  Phos  3.9     09-01  Mg     3.3     09-01    TPro  6.8  /  Alb  2.3<L>  /  TBili  0.6  /  DBili  x   /  AST  33  /  ALT  21  /  AlkPhos  79  09-01    LIVER FUNCTIONS - ( 01 Sep 2020 02:11 )  Alb: 2.3 g/dL / Pro: 6.8 g/dL / ALK PHOS: 79 U/L / ALT: 21 U/L / AST: 33 U/L / GGT: x                 ======================Micro/Rad/Cardio=================  Culture: Reviewed   CXR: Reviewed  Echo:Reviewed  ======================================================  PAST MEDICAL & SURGICAL HISTORY:  H/O hemolytic anemia  H/O autoimmune hemolytic anemia  Knee pain, right  HLD (hyperlipidemia)  Former smoker  DVT of upper extremity (deep vein thrombosis)  Hepatitis C virus  GIB (gastrointestinal bleeding)  Ventricular fibrillation: s/p AICD  PAF (paroxysmal atrial fibrillation): on xarelto  Non-Ischemic Cardiomyopathy  SVT (Supraventricular Tachycardia)  HTN  CHF (Congestive Heart Failure)  S/P right heart catheterization: biopsy multiple  H/O heart transplant: 2/2018  Status post left hip replacement  History of Prior Ablation Treatment: for afib  AICD (Automatic Cardioverter/Defibrillator) Present: [employer] with 1 [employer] lead4/1/09- explanted and replaced with Medtronic 2 leads on 9/2/09    ====================ASSESSMENT ==============  Heart transplant 2/2018 for ACC/AHA stage D NICM   Resolved GI Bleed, Melena s/p EGD  Acute respiratory failure, resolved  Supraventricular tachycardia  Severe hypertension  Hyperlactatemia acidosis, resolved  Leukopenia- resolved  Acute blood loss anemia, stable   CKD Stage III  C. diff diarrhea  Klebsiella oxytoca bacteremia  Sepsis  Azotemia 2/2 CKD and Steroids   Pancolitis    Plan:  ====================== NEUROLOGY=====================  Nonfocal, continue to monitor neuro status   Continue PT, out of bed to chair as tolerated     ==================== RESPIRATORY======================  Supplemental O2 via NC   Encourage incentive spirometry, continue pulse ox monitoring, follow ABGs     ====================CARDIOVASCULAR==================  Heart transplant 2/2018 for ACC/AHA stage D NICM, now on solumedrol from prednisone   (NPO for abd distention) off everolimus. off cellcept while on high dose steroids   Continue hemodynamic monitoring   ASA on hold for hx of GI bleed/ ulceration     methylPREDNISolone sodium succinate Injectable 16 milliGRAM(s) IV Push <User Schedule>    ===================HEMATOLOGIC/ONC ===================  Anemia s/p multiple blood transfusions  Monitor H&H and transfuse prn   Hx of GI bleed     VTE prophylaxis, heparin   Injectable 5000 Unit(s) SubCutaneous every 8 hours    ===================== RENAL =========================  NORMAN on CKD stage 3, Anasarca. lasix prn   Continue monitoring urine output via diaz, I&Os, BUN/Cr  Monitor and replete electrolytes prn     ==================== GASTROINTESTINAL===================  NPO in setting of abdominal distention   CT A/P on 8/30 with Pancolitisand small volume abdominopelvic ascites, not significantly changed since 8/20/2020.  General surgery following-continue serial abdominal exams.     multiple electrolytes Injection Type 1 1000 milliLiter(s) (120 mL/Hr) IV Continuous <Continuous>  GI prophylaxis, pantoprazole  Injectable 40 milliGRAM(s) IV Push two times a day    =======================    ENDOCRINE  =====================  Glucose control with humalog sliding scale for stress hyperglycemia     insulin lispro (HumaLOG) corrective regimen sliding scale   SubCutaneous every 8 hours    ========================INFECTIOUS DISEASE================  Klebsiella bacteremia from 8/13 which has since cleared  Clostridium difficile PCR positive, continue with vancomycin PO and metronidazole IV  Started on Rectal Vanco for worsening Cdiff presentation as per ID    Transplant prophylaxis with Posaconazole and Atovaquone on hold d/t ileus  Monitor Temp and WBC    metroNIDAZOLE  IVPB 500 milliGRAM(s) IV Intermittent every 8 hours  vancomycin    Solution 500 milliGRAM(s) Oral every 6 hours  vancomycin  Retention Enema 500 milliGRAM(s) Rectal every 6 hours      Patient requires continuous monitoring with bedside rhythm monitoring, pulse ox monitoring, and intermittent blood gas analysis. Care plan discussed with ICU care team. Patient remained critical and at risk for life threatening decompensation.     By signing my name below, I, Ronit Joe, attest that this documentation has been prepared under the direction and in the presence of SHELLY Ennis   Electronically signed: Ab Gaffney, 09-01-20 @ 09:52    I, Elijah Landry, personally performed the services described in this documentation. all medical record entries made by the ab were at my direction and in my presence. 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 Patient was treated w/ prednisone and Rituximab. Tacro was discontinued and patient was also transitioned to everolimus  Admitted  6/16-6/19  for hyperglycemia.  Reported to transplant team having one done black tarry stool-  instructed to  present to [facility] for hemolytic anemia. Patient has been following with Hematology outpatient.  Denies CP  N/V diarrhea SOB. (11 Aug 2020 20:08)      Surgery/Hospital Course:  8/11 Admitted to [facility] with symptomatic anemia  8/13 EGD for investigation of tarry stools  8/15 SB capsule: stomach & SB lesions, likely ulcers.  8/17 EGD/push enteroscopy w/ angioectasias/erosions in small bowel. Gastropathy and esophagitis. Ulcer seen on VCE most likely scope trauma.    8/18 VSS transferred back to floor.   8/20 VS 9.0/28.4 stable Gastric biopsy results LA Grade A esophagitis.  - Acute gastritis. Biopsies taken to r/o CMV, No ulceration seen despite finding on capsule endoscopy - likely 2/2 scope  trauma that has since resolved. Pathology pending.  8/21 VSS H/H  8.1/25.7  trending down will monitor prednisone taper 30 mg today. Procardia 120 initiated today RHC biopsy Monday.   8/22 VVS; Patient reports loose stools x 2-3 days with 1 episode today.  Stool sent for C-dif and GI PCR. Abdominal X-ray revealed: dilated loops of bowel. Abdomen distended.  Patient denies N/V. GI called to re-evaluate. Continue with current medication regimen.  Awaiting for upcoming cardiac biopsy on Monday 8/24.  8/23   vss    creat up to 2.28 ,  repeating CMP,  TTE ordered  Bladder scan   8/24   cardiac bx cancelled   elev creat  to 2.74   cardio renal cslt called,    + CDIF   inc vanco to 500 q6   ID following  8/25 VSS: RHC today as per transplant team; transfuse 2 units PRBC today as per Dr. Viramontes; continue vanco for cdiff; transfer patient to CTU after cath today   8/26. Dieretic challenge with good response   8/27: Downgraded to the floor then upgraded to the CTU again for concerning of abd distention   8/28 CT ABD & Pelvis reveals pancolitis  8/30: repeat CT scan of abd, gen surgery called to re-evaluated pt. pt a little more lethargic today but A&Ox3    Today: Patient A&Ox3, improvement in his symptoms as per patient. Continue serial abdominal exams no surgical intervention at this time as per gen surg. Has remained NPO for 6 days, f/u on nutrition.     REVIEW OF SYSTEMS:  Gen: No fever  EYES/ENT: No visual changes;  No vertigo or throat pain   NECK: No pain   RES:  No shortness of breath or Cough  CARD: No chest pain   GI: No abdominal pain  : No dysuria  NEURO: No weakness  SKIN: No itching, rashes     ICU Vital Signs Last 24 Hrs  T(C): 36.4 (01 Sep 2020 08:00), Max: 37 (31 Aug 2020 16:00)  T(F): 97.6 (01 Sep 2020 08:00), Max: 98.6 (31 Aug 2020 16:00)  HR: 100 (01 Sep 2020 09:12) (100 - 115)  BP: 133/80 (01 Sep 2020 09:12) (110/64 - 133/80)  BP(mean): 101 (01 Sep 2020 09:12) (83 - 103)  ABP: --  ABP(mean): --  RR: 38 (01 Sep 2020 09:12) (10 - 38)  SpO2: 97% (01 Sep 2020 09:12) (93% - 100%)    Physical Exam:  Gen:  Awake, alert   CNS: non focal 	  Neck: no JVD  RES : clear , no wheezing              CVS: Sinus Tachycardia. Normal S1/S2  Abd: firm, non-tender, distended. Bowel sounds absent. +NGT to LWS  Skin: No rash.  Ext:  +1 pedal edema    ============================I/O===========================   I&O's Detail    31 Aug 2020 07:01  -  01 Sep 2020 07:00  --------------------------------------------------------  IN:    multiple electrolytes Injection Type 1: 2880 mL    Solution: 66.4 mL    Solution: 640 mL    Solution: 300 mL    Solution: 350 mL  Total IN: 4236.4 mL    OUT:    Indwelling Catheter - Urethral: 1890 mL    Nasoenteral Tube: 100 mL  Total OUT: 1990 mL    Total NET: 2246.4 mL    01 Sep 2020 07:01  -  01 Sep 2020 09:52  --------------------------------------------------------  IN:    multiple electrolytes Injection Type 1: 120 mL    Solution: 100 mL    Solution: 150 mL    Solution: 4.2 mL  Total IN: 374.2 mL    OUT:    Indwelling Catheter - Urethral: 55 mL  Total OUT: 55 mL    Total NET: 319.2 mL    ============================ LABS =========================                        8.4    8.68  )-----------( 121      ( 01 Sep 2020 02:11 )             26.2     09-01    143  |  109<H>  |  85<H>  ----------------------------<  136<H>  3.6   |  22  |  2.24<H>    Ca    7.7<L>      01 Sep 2020 02:11  Phos  3.9     09-01  Mg     3.3     09-01    TPro  6.8  /  Alb  2.3<L>  /  TBili  0.6  /  DBili  x   /  AST  33  /  ALT  21  /  AlkPhos  79  09-01    LIVER FUNCTIONS - ( 01 Sep 2020 02:11 )  Alb: 2.3 g/dL / Pro: 6.8 g/dL / ALK PHOS: 79 U/L / ALT: 21 U/L / AST: 33 U/L / GGT: x           ======================Micro/Rad/Cardio=================  Culture: Reviewed   CXR: Reviewed  Echo:Reviewed  ======================================================  PAST MEDICAL & SURGICAL HISTORY:  H/O hemolytic anemia  H/O autoimmune hemolytic anemia  Knee pain, right  HLD (hyperlipidemia)  Former smoker  DVT of upper extremity (deep vein thrombosis)  Hepatitis C virus  GIB (gastrointestinal bleeding)  Ventricular fibrillation: s/p AICD  PAF (paroxysmal atrial fibrillation): on xarelto  Non-Ischemic Cardiomyopathy  SVT (Supraventricular Tachycardia)  HTN  CHF (Congestive Heart Failure)  S/P right heart catheterization: biopsy multiple  H/O heart transplant: 2/2018  Status post left hip replacement  History of Prior Ablation Treatment: for afib  AICD (Automatic Cardioverter/Defibrillator) Present: [employer] with 1 [employer] lead4/1/09- explanted and replaced with Medtronic 2 leads on 9/2/09    ====================ASSESSMENT ==============  Heart transplant 2/2018 for ACC/AHA stage D NICM   Resolved GI Bleed, Melena s/p EGD  Acute respiratory failure, resolved  Supraventricular tachycardia  Severe hypertension  Hyperlactatemia acidosis, resolved  Leukopenia- resolved  Acute blood loss anemia, stable   CKD Stage III  C. diff diarrhea  Klebsiella oxytoca bacteremia  Sepsis  Azotemia 2/2 CKD and Steroids   Pancolitis    Plan:  ====================== NEUROLOGY=====================  Nonfocal, continue to monitor neuro status   Continue PT, out of bed to chair as tolerated     ==================== RESPIRATORY======================  Supplemental O2 via NC   Encourage incentive spirometry, continue pulse ox monitoring, follow ABGs     ====================CARDIOVASCULAR==================  Heart transplant 2/2018 for ACC/AHA stage D NICM, now on solumedrol from prednisone   (NPO for abd distention) off everolimus. off cellcept while on high dose steroids   Continue hemodynamic monitoring   ASA on hold for hx of GI bleed/ ulceration     methylPREDNISolone sodium succinate Injectable 16 milliGRAM(s) IV Push <User Schedule>    ===================HEMATOLOGIC/ONC ===================  Anemia s/p multiple blood transfusions  Monitor H&H and transfuse prn   Hx of GI bleed     VTE prophylaxis, heparin   Injectable 5000 Unit(s) SubCutaneous every 8 hours    ===================== RENAL =========================  NORMAN on CKD stage 3, Anasarca. lasix prn   Continue monitoring urine output via diaz, I&Os, BUN/Cr  Monitor and replete electrolytes prn     ==================== GASTROINTESTINAL===================  NPO in setting of abdominal distention   CT A/P on 8/30 with Pancolitis and small volume abdominopelvic ascites, not significantly changed since 8/20/2020.  General surgery following-continue serial abdominal exams.     multiple electrolytes Injection Type 1 1000 milliLiter(s) (120 mL/Hr) IV Continuous <Continuous>  GI prophylaxis, pantoprazole  Injectable 40 milliGRAM(s) IV Push two times a day    =======================    ENDOCRINE  =====================  Glucose control with humalog sliding scale for stress hyperglycemia     insulin lispro (HumaLOG) corrective regimen sliding scale   SubCutaneous every 8 hours    ========================INFECTIOUS DISEASE================  Klebsiella bacteremia from 8/13 which has since cleared  Clostridium difficile PCR positive, continue with vancomycin PO and metronidazole IV  Started on Rectal Vanco for worsening Cdiff presentation as per ID    Transplant prophylaxis with Posaconazole and Atovaquone on hold d/t ileus  Monitor Temp and WBC  Continue Cyclosporine, monitor levels     metroNIDAZOLE  IVPB 500 milliGRAM(s) IV Intermittent every 8 hours  vancomycin    Solution 500 milliGRAM(s) Oral every 6 hours  vancomycin  Retention Enema 500 milliGRAM(s) Rectal every 6 hours      Patient requires continuous monitoring with bedside rhythm monitoring, pulse ox monitoring, and intermittent blood gas analysis. Care plan discussed with ICU care team. Patient remained critical and at risk for life threatening decompensation.     By signing my name below, I, Ronit Joe, attest that this documentation has been prepared under the direction and in the presence of SHELLY Ennis   Electronically signed: Ab Gaffney, 09-01-20 @ 09:52    I, Elijah Landry, personally performed the services described in this documentation. all medical record entries made by the ab were at my direction and in my presence. I have reviewed the chart and agree that the record reflects my personal performance and is accurate and complete  Electronically signed: SHELLY Ennis

## 2020-09-01 NOTE — PROGRESS NOTE ADULT - SUBJECTIVE AND OBJECTIVE BOX
Subjective: He notes mild improvement in abdominal distention. No shortness of breath. No fevers.     Medications:  benzocaine 15 mG/menthol 3.6 mG (Sugar-Free) Lozenge 1 Lozenge Oral every 6 hours PRN  chlorhexidine 2% Cloths 1 Application(s) Topical <User Schedule>  cycloSPORINE  (SandIMMUNE) IVPB 15 milliGRAM(s) IV Intermittent every 24 hours  Eravacycline (Xerava) 75 milliGRAM(s),Sodium Chloride 0.9% 150 milliLiter(s) 75 milliGRAM(s) IV Intermittent every 12 hours  heparin   Injectable 5000 Unit(s) SubCutaneous every 8 hours  immune   globulin 10% (GAMMAGARD) IVPB 60 Gram(s) IV Intermittent every 24 hours  insulin lispro (HumaLOG) corrective regimen sliding scale   SubCutaneous every 8 hours  methylPREDNISolone sodium succinate Injectable 16 milliGRAM(s) IV Push <User Schedule>  metroNIDAZOLE  IVPB      metroNIDAZOLE  IVPB 500 milliGRAM(s) IV Intermittent every 8 hours  multiple electrolytes Injection Type 1 1000 milliLiter(s) IV Continuous <Continuous>  pantoprazole  Injectable 40 milliGRAM(s) IV Push two times a day  vancomycin    Solution 500 milliGRAM(s) Oral every 6 hours  vancomycin  Retention Enema 500 milliGRAM(s) Rectal every 6 hours      Physical Exam:    Vital Signs Last 24 Hrs  T(C): 36.1 (01 Sep 2020 03:00), Max: 37 (31 Aug 2020 16:00)  T(F): 97 (01 Sep 2020 03:00), Max: 98.6 (31 Aug 2020 16:00)  HR: 106 (01 Sep 2020 07:00) (104 - 112)  BP: 118/85 (01 Sep 2020 06:00) (104/68 - 130/82)  BP(mean): 98 (01 Sep 2020 06:00) (83 - 103)  RR: 12 (01 Sep 2020 07:00) (10 - 24)  SpO2: 100% (01 Sep 2020 07:00) (93% - 100%)    Daily Weight in k.1 (01 Sep 2020 00:00)    I&O's Summary    31 Aug 2020 07:01  -  01 Sep 2020 07:00  --------------------------------------------------------  IN: 4236.4 mL / OUT: 1990 mL / NET: 2246.4 mL    General: No distress. Comfortable.  HEENT: EOM intact. Cushingoid facies.   Neck: Neck supple. JVP not elevated. No masses  Chest: Clear to auscultation bilaterally.   CV: Tachycardic. Normal S1 and S2. No rubs or gallops. Radial pulses normal. 2+ edema in legs and arms bilaterally.   Abdomen: Distended, tympanic, mildly tender to palpation.   Skin: No rashes or skin breakdown  Neurology: Alert and oriented times three. Sensation intact  Psych: Affect normal    Labs:                        8.4    8.68  )-----------( 121      ( 01 Sep 2020 02:11 )             26.2         143  |  109<H>  |  85<H>  ----------------------------<  136<H>  3.6   |  22  |  2.24<H>    Ca    7.7<L>      01 Sep 2020 02:11  Phos  3.9       Mg     3.3     -    TPro  6.8  /  Alb  2.3<L>  /  TBili  0.6  /  DBili  x   /  AST  33  /  ALT  21  /  AlkPhos  79  -      Cyclosporine Level: 82: CYCLOSPORINE: Assay performed by Chemiluminescent Microparticle  Immunoassay (CMIA) on the [employer] system.  All immunoassay tests  are subject to rare interferences from heterophile antibodies so that if  atypical or unexpected results are obtained the lab should be notified to  confirm this result by an alternative method before adjusting medication  dosage. The therapeutic range of 150-400 ng/mL is based on trough levels  of Cyclosporine but levels for clinical management will vary depending on  the organ transplanted, post-dose time of draw, length of time  post-transplant and the individual patient's metabolism. ng/mL (20 @ 10:03)

## 2020-09-01 NOTE — CHART NOTE - NSCHARTNOTEFT_GEN_A_CORE
Nutrition Follow Up Note    Patient seen for: Nutritional follow up     Source: RN, PA, Medical record, CTU team, Dr Parker    Chart reviewed, events noted. 70 year old male with PMHx of NICM, s/p HM2 LVAD and OHT in 2018, with known coronary fistula. Recent admission for hemolytic anemia, now admitted for hyperglycemia and GIB. EGD and capsule study negative. Pt now C-Diff colitis w/o toxic megacolon. Pt receiving Vanco retention enema nad Flagyl. Pt getting IVIG x5 days.  Pt being followed by surgery team, no intervention at this time.    Per ID, Fecal mater transplant not an option at this time.     Diet: NPO x6 days.     Patient reports: Pt seen, out of bed to chair. Reports being hungry and thirsty. Last BM on /   Per Dr. Parker, Pt to remain NPO at this time, but willing to consider alternate means of nutrition support.    At this time, Pt receiving Plasmalyte @ 120ml/hr.      PO intake: NPO x6 days      Daily Weight in k.1 (), Weight in k.4 (-), Weight in k.6 (-30), Weight in k.2 (-), Weight in k.1 (-)  Increase noted, likely related to fluid retention.     Drug Dosing Weight  Weight (kg): 75.2 (17 Aug 2020 14:29)  BMI (kg/m2): 26 (17 Aug 2020 14:29)      Pertinent Medications: MEDICATIONS  (STANDING):  chlorhexidine 2% Cloths 1 Application(s) Topical <User Schedule>  cycloSPORINE  (SandIMMUNE) IVPB 15 milliGRAM(s) IV Intermittent every 24 hours  Eravacycline (Xerava) 75 milliGRAM(s),Sodium Chloride 0.9% 150 milliLiter(s) 75 milliGRAM(s) IV Intermittent every 12 hours  heparin   Injectable 5000 Unit(s) SubCutaneous every 8 hours  immune   globulin 10% (GAMMAGARD) IVPB 60 Gram(s) IV Intermittent every 24 hours  insulin lispro (HumaLOG) corrective regimen sliding scale   SubCutaneous every 8 hours  methylPREDNISolone sodium succinate Injectable 16 milliGRAM(s) IV Push <User Schedule>  metroNIDAZOLE  IVPB      metroNIDAZOLE  IVPB 500 milliGRAM(s) IV Intermittent every 8 hours  multiple electrolytes Injection Type 1 1000 milliLiter(s) (120 mL/Hr) IV Continuous <Continuous>  pantoprazole  Injectable 40 milliGRAM(s) IV Push two times a day  vancomycin    Solution 500 milliGRAM(s) Oral every 6 hours  vancomycin  Retention Enema 500 milliGRAM(s) Rectal every 6 hours    MEDICATIONS  (PRN):  benzocaine 15 mG/menthol 3.6 mG (Sugar-Free) Lozenge 1 Lozenge Oral every 6 hours PRN Sore Throat      LABS:    @ 02:11: Sodium 143, Potassium 3.6, Calcium 7.7<L>, Magnesium 3.3<H>, Phosphorus 3.9, BUN 85<H>, Creatinine 2.24<H>, Glucose 136<H>, Alk Phos 79, ALT/SGPT 21, AST/SGOT 33, Albumin 2.3<L>, Prealbumin --, Total Bilirubin 0.6, Hemoglobin 8.4<L>, Hematocrit 26.2<L>, Ferritin --, C-Reactive Protein --, Creatine Kinase <<27>      Finger Sticks:  POCT Blood Glucose.: 137 mg/dL ( @ 05:03)  POCT Blood Glucose.: 137 mg/dL ( @ 22:40)  POCT Blood Glucose.: 132 mg/dL ( @ 11:34)      Skin per nursing documentation:  No pressure injuries  Edema: +1 generalized, +2 tara arm, +3 tara ankle and foot edema     Estimated Needs:   [ X] no change since previous assessment Based on dosing wt: 74.8kg    Estimated Energy Needs (20-25 calories/kg): 1496 1870   Estimated Protein Needs (0.75-1.0 gm/kg): 56.1- 74.8    Previous Nutrition Diagnosis: altered nutrition lab values  Nutrition Diagnosis is: Defer at this time.     Previous Nutrition Diagnosis: inadequate oral intake  Nutrition Diagnosis is: on going at this time.     New Nutrition Diagnosis: Acute Severe Malnutrition related to altered GI function in the setting of C-Diff as evidenced by NPO x6 days, Worsening edema       Interventions:     Recommend  1. Defer PO diet to team  2. Consider alternate means of nutrition support given length of NPO status   3. Defer IVF to team   4. Trend GI tolerance   5. Trend BG levels, renal indices, LFT's, electrolytes     Monitoring and Evaluation:   Continue to monitor nutritional intake, tolerance to diet prescription, weights, labs, skin integrity  RD remains available upon request and will follow up per protocol  Gabbi Flowers RD, CDN, [facility] Pager #[phone]. Nutrition Follow Up Note    Patient seen for: Nutritional follow up     Source: RN, PA, Medical record, CTU team, Dr Parker    Chart reviewed, events noted. 70 year old male with PMHx of NICM, s/p HM2 LVAD and OHT in 2018, with known coronary fistula. Recent admission for hemolytic anemia, now admitted for hyperglycemia and GIB. EGD and capsule study negative. Pt now C-Diff colitis w/o toxic megacolon. Pt receiving Vanco retention enema nad Flagyl. Pt getting IVIG x5 days.  Pt being followed by surgery team, no intervention at this time.    Per ID, Fecal mater transplant not an option at this time.     Diet: NPO x6 days.     Patient reports: Pt seen, out of bed to chair. Reports being hungry and thirsty. Last BM on /   Per Dr. Parker, Pt to remain NPO at this time, but willing to consider alternate means of nutrition support.    At this time, Pt receiving Plasmalyte @ 120ml/hr.      PO intake: NPO x6 days    NGT to LWS  : 150ml  : 100ml  : 100ml      Daily Weight in k.1 (), Weight in k.4 (), Weight in k.6 (), Weight in k.2 (), Weight in k.1 ()  Increase noted, likely related to fluid retention.     Drug Dosing Weight  Weight (kg): 75.2 (17 Aug 2020 14:29)  BMI (kg/m2): 26 (17 Aug 2020 14:29)      Pertinent Medications: MEDICATIONS  (STANDING):  chlorhexidine 2% Cloths 1 Application(s) Topical <User Schedule>  cycloSPORINE  (SandIMMUNE) IVPB 15 milliGRAM(s) IV Intermittent every 24 hours  Eravacycline (Xerava) 75 milliGRAM(s),Sodium Chloride 0.9% 150 milliLiter(s) 75 milliGRAM(s) IV Intermittent every 12 hours  heparin   Injectable 5000 Unit(s) SubCutaneous every 8 hours  immune   globulin 10% (GAMMAGARD) IVPB 60 Gram(s) IV Intermittent every 24 hours  insulin lispro (HumaLOG) corrective regimen sliding scale   SubCutaneous every 8 hours  methylPREDNISolone sodium succinate Injectable 16 milliGRAM(s) IV Push <User Schedule>  metroNIDAZOLE  IVPB      metroNIDAZOLE  IVPB 500 milliGRAM(s) IV Intermittent every 8 hours  multiple electrolytes Injection Type 1 1000 milliLiter(s) (120 mL/Hr) IV Continuous <Continuous>  pantoprazole  Injectable 40 milliGRAM(s) IV Push two times a day  vancomycin    Solution 500 milliGRAM(s) Oral every 6 hours  vancomycin  Retention Enema 500 milliGRAM(s) Rectal every 6 hours    MEDICATIONS  (PRN):  benzocaine 15 mG/menthol 3.6 mG (Sugar-Free) Lozenge 1 Lozenge Oral every 6 hours PRN Sore Throat      LABS:    @ 02:11: Sodium 143, Potassium 3.6, Calcium 7.7<L>, Magnesium 3.3<H>, Phosphorus 3.9, BUN 85<H>, Creatinine 2.24<H>, Glucose 136<H>, Alk Phos 79, ALT/SGPT 21, AST/SGOT 33, Albumin 2.3<L>, Prealbumin --, Total Bilirubin 0.6, Hemoglobin 8.4<L>, Hematocrit 26.2<L>, Ferritin --, C-Reactive Protein --, Creatine Kinase <<27>      Finger Sticks:  POCT Blood Glucose.: 137 mg/dL ( @ 05:03)  POCT Blood Glucose.: 137 mg/dL ( @ 22:40)  POCT Blood Glucose.: 132 mg/dL ( @ 11:34)      Skin per nursing documentation:  No pressure injuries  Edema: +1 generalized, +2 tara arm, +3 tara ankle and foot edema     Estimated Needs:   [ X] no change since previous assessment Based on dosing wt: 74.8kg    Estimated Energy Needs (20-25 calories/kg): 1496 1870   Estimated Protein Needs (0.75-1.0 gm/kg): 56.1- 74.8    Previous Nutrition Diagnosis: altered nutrition lab values  Nutrition Diagnosis is: Defer at this time.     Previous Nutrition Diagnosis: inadequate oral intake  Nutrition Diagnosis is: on going at this time.     New Nutrition Diagnosis: Acute Severe Malnutrition related to altered GI function in the setting of C-Diff as evidenced by NPO x6 days, Worsening edema       Interventions:     Recommend  1. Defer PO diet to team  2. Consider alternate means of nutrition support given length of NPO status   3. Defer IVF to team   4. Trend GI tolerance   5. Trend BG levels, renal indices, LFT's, electrolytes     Monitoring and Evaluation:   Continue to monitor nutritional intake, tolerance to diet prescription, weights, labs, skin integrity  RD remains available upon request and will follow up per protocol  Gabbi Flowers RD, CDN, [facility] Pager #[phone].

## 2020-09-01 NOTE — PATIENT PROFILE ADULT. - EXTENSIONS OF SELF_ADULT
Bed: H1  Expected date:   Expected time:   Means of arrival:   Comments:  Ashley Bryant RN  09/01/20 [id number]
Cane/Dentures/Eyeglasses

## 2020-09-01 NOTE — PROGRESS NOTE ADULT - ATTENDING COMMENTS
Patient seen/examined.  Agree w above note and plan and have discussed plan w house staff.  Denies pain, SOB.  Afebrile.  NG 100cc/24 hrs.  Abdomen distended, tympanitic, non-tender.  Would keep NPO till less distended.  TPN, PPN?    Ayden Mendoza MD

## 2020-09-01 NOTE — PROGRESS NOTE ADULT - ATTENDING COMMENTS
pt remains critically ill in CCU  cards and CCU team apprec  solumedrol taper slowly q week  no signs of active hemolysis on labs  repeat RUE doppler neg for DVT  monitor edema  monitor I/Os  cont abx for C. diff colitis  will follow with you

## 2020-09-01 NOTE — PROGRESS NOTE ADULT - SUBJECTIVE AND OBJECTIVE BOX
[facility] DIVISION OF KIDNEY DISEASES AND HYPERTENSION -- FOLLOW UP NOTE  --------------------------------------------------------------------------------  If any questions, please feel free to contact me  NS pager: [phone], [facility]: [phone]  Drew Vale M.D.  Nephrology Fellow    (After 5 pm or on weekends please page the on-call fellow)  --------------------------------------------------------------------------------    Chief Complaint:  Patient is a 70y old  Male who presents with a chief complaint of SOB/anemia (01 Sep 2020 08:10)    24 hour events/subjective:  Patient seen and examined at [facility], UOP: 1.8L in the past 24hrs   BP controlled, no acute events overnight   vitals/lab/medications reviewed- sCr  improved 3.1>> 2.7 >>2.2      PAST HISTORY  --------------------------------------------------------------------------------  No significant changes to PMH, PSH, FHx, SHx, unless otherwise noted    ALLERGIES & MEDICATIONS  --------------------------------------------------------------------------------  Allergies    No Known Allergies    Intolerances      Standing Inpatient Medications  chlorhexidine 2% Cloths 1 Application(s) Topical <User Schedule>  cycloSPORINE  (SandIMMUNE) IVPB 15 milliGRAM(s) IV Intermittent every 24 hours  Eravacycline (Xerava) 75 milliGRAM(s),Sodium Chloride 0.9% 150 milliLiter(s) 75 milliGRAM(s) IV Intermittent every 12 hours  heparin   Injectable 5000 Unit(s) SubCutaneous every 8 hours  immune   globulin 10% (GAMMAGARD) IVPB 60 Gram(s) IV Intermittent every 24 hours  insulin lispro (HumaLOG) corrective regimen sliding scale   SubCutaneous every 8 hours  methylPREDNISolone sodium succinate Injectable 16 milliGRAM(s) IV Push <User Schedule>  metroNIDAZOLE  IVPB      metroNIDAZOLE  IVPB 500 milliGRAM(s) IV Intermittent every 8 hours  multiple electrolytes Injection Type 1 1000 milliLiter(s) IV Continuous <Continuous>  pantoprazole  Injectable 40 milliGRAM(s) IV Push two times a day  vancomycin    Solution 500 milliGRAM(s) Oral every 6 hours  vancomycin  Retention Enema 500 milliGRAM(s) Rectal every 6 hours    PRN Inpatient Medications  benzocaine 15 mG/menthol 3.6 mG (Sugar-Free) Lozenge 1 Lozenge Oral every 6 hours PRN      REVIEW OF SYSTEMS  --------------------------------------------------------------------------------  Gen: No fevers/chills  Skin: No rashes  Head/Eyes/Ears: Normal hearing,   Respiratory: No dyspnea, cough  CV: No chest pain  GI: No abdominal pain, diarrhea  : No dysuria, hematuria  MSK: No  edema  Heme: No easy bruising or bleeding  Psych: No significant depression      All other systems were reviewed and are negative, except as noted.    VITALS/PHYSICAL EXAM  --------------------------------------------------------------------------------  PRACHI): 36.4 (09-01-20 @ 08:00), Max: 37 (08-31-20 @ 16:00)  HR: 100 (09-01-20 @ 09:12) (100 - 115)  BP: 133/80 (09-01-20 @ 09:12) (110/64 - 133/80)  RR: 38 (09-01-20 @ 09:12) (10 - 38)  SpO2: 97% (09-01-20 @ 09:12) (93% - 100%)  Wt(kg): --        08-31-20 @ 07:01  -  09-01-20 @ 07:00  --------------------------------------------------------  IN: 4236.4 mL / OUT: 1990 mL / NET: 2246.4 mL    09-01-20 @ 07:01  -  09-01-20 @ 09:31  --------------------------------------------------------  IN: 374.2 mL / OUT: 55 mL / NET: 319.2 mL        Physical Exam:  	Gen: NAD on nasal cannula  	HEENT: NGT in place  	Pulm: CTA B/l anteriorly  	CV: Tachycardic   	Abd: +BS, soft, NTND       	: + diaz catheter in place w/ urine  	MSK: Warm, bilateral lower ext edema  	Neuro: No focal deficits, AOX3, no asterixis   	Psych: Appropriate Affect  	Vascular Access: None      LABS/STUDIES  --------------------------------------------------------------------------------              8.4    8.68  >-----------<  121      [09-01-20 @ 02:11]              26.2     143  |  109  |  85  ----------------------------<  136      [09-01-20 @ 02:11]  3.6   |  22  |  2.24        Ca     7.7     [09-01-20 @ 02:11]      Mg     3.3     [09-01-20 @ 02:11]      Phos  3.9     [09-01-20 @ 02:11]    TPro  6.8  /  Alb  2.3  /  TBili  0.6  /  DBili  x   /  AST  33  /  ALT  21  /  AlkPhos  79  [09-01-20 @ 02:11]          Creatinine Trend:  SCr 2.24 [09-01 @ 02:11]  SCr 2.70 [08-30 @ 22:12]  SCr 3.10 [08-30 @ 16:13]  SCr 3.17 [08-30 @ 01:19]  SCr 3.00 [08-29 @ 01:02]    Urinalysis - [08-24-20 @ 21:27]      Color Yellow / Appearance Slightly Turbid / SG 1.018 / pH 5.5      Gluc Negative / Ketone Negative  / Bili Negative / Urobili <2 mg/dL       Blood Negative / Protein 30 mg/dL / Leuk Est Negative / Nitrite Negative      RBC 1 / WBC 3 / Hyaline 2 / Gran  / Sq Epi  / Non Sq Epi 2 / Bacteria Negative      Iron 36, TIBC 218, %sat 16      [08-15-20 @ 21:08]  Ferritin 764      [08-15-20 @ 21:12]  Vitamin D (25OH) 33.5      [04-30-20 @ 09:06]  HbA1c 4.7      [11-07-18 @ 23:18]  TSH 1.22      [08-12-20 @ 00:18]

## 2020-09-01 NOTE — PROGRESS NOTE ADULT - SUBJECTIVE AND OBJECTIVE BOX
INTERVAL HPI/OVERNIGHT EVENTS:  No overnight events. Assessed at bedside. Continues to have a distended abdomen and arm swelling. Otherwise ok.     MEDICATIONS  (STANDING):  chlorhexidine 2% Cloths 1 Application(s) Topical <User Schedule>  cycloSPORINE  (SandIMMUNE) IVPB 15 milliGRAM(s) IV Intermittent every 24 hours  Eravacycline (Xerava) 75 milliGRAM(s),Sodium Chloride 0.9% 150 milliLiter(s) 75 milliGRAM(s) IV Intermittent every 12 hours  heparin   Injectable 5000 Unit(s) SubCutaneous every 8 hours  immune   globulin 10% (GAMMAGARD) IVPB 60 Gram(s) IV Intermittent every 24 hours  insulin lispro (HumaLOG) corrective regimen sliding scale   SubCutaneous every 8 hours  methylPREDNISolone sodium succinate Injectable 16 milliGRAM(s) IV Push <User Schedule>  metroNIDAZOLE  IVPB      metroNIDAZOLE  IVPB 500 milliGRAM(s) IV Intermittent every 8 hours  multiple electrolytes Injection Type 1 1000 milliLiter(s) (120 mL/Hr) IV Continuous <Continuous>  pantoprazole  Injectable 40 milliGRAM(s) IV Push two times a day  vancomycin    Solution 500 milliGRAM(s) Oral every 6 hours  vancomycin  Retention Enema 500 milliGRAM(s) Rectal every 6 hours    MEDICATIONS  (PRN):  benzocaine 15 mG/menthol 3.6 mG (Sugar-Free) Lozenge 1 Lozenge Oral every 6 hours PRN Sore Throat    Allergies    No Known Allergies    Intolerances          VITAL SIGNS:  T(F): 97.7 (09-01-20 @ 16:00)  HR: 110 (09-01-20 @ 16:00)  BP: 125/91 (09-01-20 @ 16:00)  RR: 11 (09-01-20 @ 16:00)  SpO2: 98% (09-01-20 @ 16:00)  Wt(kg): --    PHYSICAL EXAM:    Constitutional: NAD  Eyes: EOMI, sclera non-icteric  Neck: supple  Respiratory: CTAB, no wheezes or crackles   Cardiovascular: RRR. + anasarca  Gastrointestinal: distended, no bowel sounds, NG tube with gastric fluid draining  Extremities: no cyanosis, clubbing or edema. +anasarca and significant RUE swelling  Neurological: awake and alert    LABS:                        8.4    8.68  )-----------( 121      ( 01 Sep 2020 02:11 )             26.2     09-01    143  |  109<H>  |  85<H>  ----------------------------<  136<H>  3.6   |  22  |  2.24<H>    Ca    7.7<L>      01 Sep 2020 02:11  Phos  3.9     09-01  Mg     3.3     09-01    TPro  6.8  /  Alb  2.3<L>  /  TBili  0.6  /  DBili  x   /  AST  33  /  ALT  21  /  AlkPhos  79  09-01          RADIOLOGY & ADDITIONAL TESTS:  Studies reviewed.

## 2020-09-01 NOTE — PROGRESS NOTE ADULT - ASSESSMENT
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   -Currently no evidence of hemolysis although Jacky IgG positive, C3 negative   -presented with black stool   -Indirect bilirubin normal   -8/10 labs outpatient showing LDH elevated but stable, Tbili 0.7, retic mildly increased to 4.6%   -most likely anemia related to GI bleed, blood loss given labs and history   -GI consulted s/p endoscopy not revealing of bleed but per GI note: formal capsule report pending but possible gastric ulcer, duodenal angioectasisas/erosions, and 1 small bowel angioectasia seen, no active bleeding.   -Repeat endoscopy not revealing of ulcers or erosions seen on capsule but did have esophagitis, bx taken and sent for viral studies (CMV, etc) given immunocompromised status   -continue with IV protonix per GI   -Solumedrol taper per heart failure team; this will also address her AIHA. Please inform heme team if plans to stop steroids by heart failure team so that we can re-assess her need for AIHA.   -recent haptoglobin 8/26 normal, not indicative of hemolysis   -continue folic acid supplementation    #Klebsiella Bacteremia   -completed abx on 8/24     #Cdiff  #Ileus  -Vanc PO and IV flagyl per ID   -abdomen distended, XR indicating Ileus now with NG tube  -plan per primary/surgery     #Heart transplant recipient  -Repeat heart biopsy per transplant team      Lyndsay Valentin, PGY-4  Hematology-Oncology Fellow  [phone] ([place]) [phone] ([facility])  Please page fellow on call after 5pm and on weekends

## 2020-09-01 NOTE — PROGRESS NOTE ADULT - PROBLEM SELECTOR PLAN 2
- We will adjust IV cyclosporine as necessary. Currently at 15mg/24hours to reduce overall level of immunosuppression. Repeat everolimus level.    - Continue current methylprednisone dose (equivalent to 20 mg daily of prednisone).

## 2020-09-01 NOTE — PROGRESS NOTE ADULT - ASSESSMENT
Dx: Adjustment disorder; unspecified F43.20     Recommendations:   ·	Explain all information in simple, concise language with teach back to ensure understanding  ·	Include support system in all education   ·	Holistic RN  ·	Behavioral Cardiology will continue to follow Mood improved today, less anxious. Continues to have concern about his health issues but coping better. Tired today. Napping during day. Receptive to support and validation. Coping strategies include his [employer] pastora (Yazdanism), thinking about his granddaughter. Denies symptoms of depression. Denies s/i. Denies panic attacks.     Dx: Adjustment disorder; unspecified F43.20     Recommendations:   ·	Explain all information in simple, concise language with teach back to ensure understanding  ·	Include support system in all education   ·	Holistic RN  ·	Behavioral Cardiology will continue to follow

## 2020-09-01 NOTE — PROGRESS NOTE ADULT - ASSESSMENT
71 YO M s/p heart transplantation on 2/2018 with coronary fistula with prior AMR, CKD III (baseline Cr 1.4), HCV s/p treatment, and recently diagnosed autoimmune hemolytic anemia who was admitted with symptomatic anemia with Hgb of 6.6. He has received a total of 3 units PRBC this admission with slow downtrend of hemoglobin. Labs were not consistent with hemolysis and he reported dark stools for which EGD/enteroscopy eventually revealed chronic gastritis and small bowel ectasias. He developed acute respiratory distress and profound sinus tachycardia on 8/13 in setting of Klebsiella bacteremia of unclear origin (preceded EGD) for which CT performed which suggests possible pneumonia. While his bacteremia was treated, he subsequently developed C diff colitis with severe abdominal distention. He has worsening acute renal failure, but is non-oliguric. He has some ongoing improvement in abdominal distention today.     Plan discussed in multidisciplinary rounds with CTU team and CT surgery.

## 2020-09-01 NOTE — PROGRESS NOTE ADULT - SUBJECTIVE AND OBJECTIVE BOX
Subjective:   Patient seen at bedside this AM. Improving abdominal discomfort. Denies chest pain, SOB.  Has bowel movements. Denies nausea or vomiting.    24h Events:   - Overnight, no acute events    Objective:  Vital Signs  T(C): 36.4 (08-31 @ 19:00), Max: 37 (08-31 @ 08:00)  HR: 104 (09-01 @ 02:00) (104 - 114)  BP: 119/79 (09-01 @ 02:00) (103/67 - 130/82)  RR: 20 (09-01 @ 02:00) (10 - 77)  SpO2: 98% (09-01 @ 02:00) (93% - 99%)  08-30-20 @ 07:01  -  08-31-20 @ 07:00  --------------------------------------------------------  IN: 3860.4 mL / OUT: 1645 mL / NET: 2215.4 mL    08-31-20 @ 07:01  -  09-01-20 @ 02:57  --------------------------------------------------------  IN: 3375.9 mL / OUT: 1615 mL / NET: 1760.9 mL        I&O's Detail    08-30-20 @ 07:01  -  08-31-20 @ 07:00  --------------------------------------------------------  IN: 3860.4 mL / OUT: 1645 mL / NET: 2215.4 mL    08-31-20 @ 07:01  -  09-01-20 @ 02:57  --------------------------------------------------------  IN: 3375.9 mL / OUT: 1615 mL / NET: 1760.9 mL        Physical Exam:  GEN: resting in bed comfortably in NAD  RESP: no increased WOB  ABD: soft, non-distended, non-tender to palpation without rebound tenderness; drains?  EXTR: warm, well-perfused    Labs:                        8.4    8.68  )-----------( 121      ( 01 Sep 2020 02:11 )             26.2   09-01    143  |  109<H>  |  85<H>  ----------------------------<  136<H>  3.6   |  22  |  2.24<H>    Ca    7.7<L>      01 Sep 2020 02:11  Phos  3.9     09-01  Mg     3.3     09-01    TPro  6.8  /  Alb  2.3<L>  /  TBili  0.6  /  DBili  x   /  AST  33  /  ALT  21  /  AlkPhos  79  09-01    CAPILLARY BLOOD GLUCOSE      POCT Blood Glucose.: 137 mg/dL (31 Aug 2020 22:40)  POCT Blood Glucose.: 132 mg/dL (31 Aug 2020 11:34)  POCT Blood Glucose.: 126 mg/dL (31 Aug 2020 05:05)      Medications:   MEDICATIONS  (STANDING):  chlorhexidine 2% Cloths 1 Application(s) Topical <User Schedule>  cycloSPORINE  (SandIMMUNE) IVPB 15 milliGRAM(s) IV Intermittent every 24 hours  Eravacycline (Xerava) 75 milliGRAM(s),Sodium Chloride 0.9% 150 milliLiter(s) 75 milliGRAM(s) IV Intermittent every 12 hours  heparin   Injectable 5000 Unit(s) SubCutaneous every 8 hours  immune   globulin 10% (GAMMAGARD) IVPB 60 Gram(s) IV Intermittent every 24 hours  insulin lispro (HumaLOG) corrective regimen sliding scale   SubCutaneous every 8 hours  methylPREDNISolone sodium succinate Injectable 16 milliGRAM(s) IV Push <User Schedule>  metroNIDAZOLE  IVPB      metroNIDAZOLE  IVPB 500 milliGRAM(s) IV Intermittent every 8 hours  multiple electrolytes Injection Type 1 1000 milliLiter(s) (120 mL/Hr) IV Continuous <Continuous>  pantoprazole  Injectable 40 milliGRAM(s) IV Push two times a day  vancomycin    Solution 500 milliGRAM(s) Oral every 6 hours  vancomycin  Retention Enema 500 milliGRAM(s) Rectal every 6 hours    MEDICATIONS  (PRN):  benzocaine 15 mG/menthol 3.6 mG (Sugar-Free) Lozenge 1 Lozenge Oral every 6 hours PRN Sore Throat Subjective:   Patient seen at bedside this AM. Improving abdominal discomfort. Denies chest pain, SOB.  Has bowel movements. Denies nausea or vomiting.    24h Events:   - Overnight, no acute events    Objective:  Vital Signs  T(C): 36.4 (08-31 @ 19:00), Max: 37 (08-31 @ 08:00)  HR: 104 (09-01 @ 02:00) (104 - 114)  BP: 119/79 (09-01 @ 02:00) (103/67 - 130/82)  RR: 20 (09-01 @ 02:00) (10 - 77)  SpO2: 98% (09-01 @ 02:00) (93% - 99%)  08-30-20 @ 07:01  -  08-31-20 @ 07:00  --------------------------------------------------------  IN: 3860.4 mL / OUT: 1645 mL / NET: 2215.4 mL    08-31-20 @ 07:01  -  09-01-20 @ 02:57  --------------------------------------------------------  IN: 3375.9 mL / OUT: 1615 mL / NET: 1760.9 mL        I&O's Detail    08-30-20 @ 07:01  -  08-31-20 @ 07:00  --------------------------------------------------------  IN: 3860.4 mL / OUT: 1645 mL / NET: 2215.4 mL    08-31-20 @ 07:01  -  09-01-20 @ 02:57  --------------------------------------------------------  IN: 3375.9 mL / OUT: 1615 mL / NET: 1760.9 mL        Physical Exam:  GEN: resting in bed comfortably in NAD  RESP: no increased WOB  ABD: distended, nontender  EXTR: warm, well-perfused    Labs:                        8.4    8.68  )-----------( 121      ( 01 Sep 2020 02:11 )             26.2   09-01    143  |  109<H>  |  85<H>  ----------------------------<  136<H>  3.6   |  22  |  2.24<H>    Ca    7.7<L>      01 Sep 2020 02:11  Phos  3.9     09-01  Mg     3.3     09-01    TPro  6.8  /  Alb  2.3<L>  /  TBili  0.6  /  DBili  x   /  AST  33  /  ALT  21  /  AlkPhos  79  09-01    CAPILLARY BLOOD GLUCOSE      POCT Blood Glucose.: 137 mg/dL (31 Aug 2020 22:40)  POCT Blood Glucose.: 132 mg/dL (31 Aug 2020 11:34)  POCT Blood Glucose.: 126 mg/dL (31 Aug 2020 05:05)      Medications:   MEDICATIONS  (STANDING):  chlorhexidine 2% Cloths 1 Application(s) Topical <User Schedule>  cycloSPORINE  (SandIMMUNE) IVPB 15 milliGRAM(s) IV Intermittent every 24 hours  Eravacycline (Xerava) 75 milliGRAM(s),Sodium Chloride 0.9% 150 milliLiter(s) 75 milliGRAM(s) IV Intermittent every 12 hours  heparin   Injectable 5000 Unit(s) SubCutaneous every 8 hours  immune   globulin 10% (GAMMAGARD) IVPB 60 Gram(s) IV Intermittent every 24 hours  insulin lispro (HumaLOG) corrective regimen sliding scale   SubCutaneous every 8 hours  methylPREDNISolone sodium succinate Injectable 16 milliGRAM(s) IV Push <User Schedule>  metroNIDAZOLE  IVPB      metroNIDAZOLE  IVPB 500 milliGRAM(s) IV Intermittent every 8 hours  multiple electrolytes Injection Type 1 1000 milliLiter(s) (120 mL/Hr) IV Continuous <Continuous>  pantoprazole  Injectable 40 milliGRAM(s) IV Push two times a day  vancomycin    Solution 500 milliGRAM(s) Oral every 6 hours  vancomycin  Retention Enema 500 milliGRAM(s) Rectal every 6 hours    MEDICATIONS  (PRN):  benzocaine 15 mG/menthol 3.6 mG (Sugar-Free) Lozenge 1 Lozenge Oral every 6 hours PRN Sore Throat

## 2020-09-01 NOTE — PROGRESS NOTE ADULT - ATTENDING COMMENTS
Patient seen and examined    Case discussed with Dr. Infante    I agree with his interval history exam and plans as noted above    Sever C.diff with minimal improvement to date  Non surgical abdomen but Surgery following in case of change    continue oral Vancomycin  continue Eravacycline- plan to treat for 7 day course  would discontinue the IV flagyl  complete IVIG after 5 days total    Gary Lujan MD  [phone]  After 5pm/weekends [phone]

## 2020-09-01 NOTE — PROGRESS NOTE ADULT - ASSESSMENT
70y old  Male with pmhx of  NICM s/p HM2 s/p OHT in 2018, autoimmune hemolytic anemia s/p steroids/rituxan, admitted for GIB c/b bacteremia, cdiff, and now NORMAN.    #NORMAN   - non oliguric NORMAN like multifactorial 2/2 Cdiff infection, everolimus   - His baseline Scr ~1mg/dl, peaked at 3 mg/dl on 8/25  - sCr improving 3> 2.7>2.2  - RHC on 8/25 with elevated pressures, given 2u pRBC and 40mg IV lasix with good response 8/25-26  - currently monitoring off diuretics with good UOP, continue monitor UOP.   - F/u surgery following for ileus and colitis   - F/u everolimus level, may need to adjust dose in setting of diarrhea/NORMAN  - Monitor UOP and daily weights. Dose medications as per eGFR<20    #Anemia  - 2/2 GIB, s/p 2 units on 8/25 with appropriate response  - Iron stores low, cannot give IV iron in setting of active infection  - pRBC transfusions per primary team  - No signs of active hemolysis

## 2020-09-01 NOTE — PROGRESS NOTE ADULT - SUBJECTIVE AND OBJECTIVE BOX
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 Patient was treated w/ prednisone and Rituximab. Tacro was discontinued and patient was also transitioned to everolimus  Admitted  6/16-6/19  for hyperglycemia.  Reported to transplant team having one done black tarry stool-  instructed to  present to [facility] for hemolytic anemia. Patient has been following with Hematology outpatient.  Denies CP  N/V diarrhea SOB. (11 Aug 2020 20:08)    Hospital course:  8/11 Admitted to [facility] with symptomatic anemia  8/13 EGD for investigation of tarry stools  8/15 SB capsule: stomach & SB lesions, likely ulcers.  8/17 EGD/push enteroscopy w/ angioectasias/erosions in small bowel. Gastropathy and esophagitis. Ulcer seen on VCE most likely scope trauma.    8/18 VSS transferred back to floor.   8/20 VS 9.0/28.4 stable Gastric biopsy results LA Grade A esophagitis.  - Acute gastritis. Biopsies taken to r/o CMV, No ulceration seen despite finding on capsule endoscopy - likely 2/2 scope  trauma that has since resolved. Pathology pending.  8/21 VSS H/H  8.1/25.7  trending down will monitor prednisone taper 30 mg today. Procardia 120 initiated today RHC biopsy Monday.   8/22 VVS; Patient reports loose stools x 2-3 days with 1 episode today.  Stool sent for C-dif and GI PCR. Abdominal X-ray revealed: dilated loops of bowel. Abdomen distended.  Patient denies N/V. GI called to re-evaluate. Continue with current medication regimen.  Awaiting for upcoming cardiac biopsy on Monday 8/24.  8/23   vss    creat up to 2.28 ,  repeating CMP,  TTE ordered  Bladder scan   8/24   cardiac bx cancelled   elev creat  to 2.74   cardio renal cslt called,    + CDIF   inc vanco to 500 q6   ID following  8/25 VSS: RHC today as per transplant team; transfuse 2 units PRBC today as per Dr. Viramontes; continue vanco for cdiff; transfer patient to CTU after cath today   8/26. Dieretic challenge with good response   8/27: Downgraded to the floor then upgraded to the CTU again for concerning of abd distention   8/28 CT ABD & Pelvis reveals pancolitis  8/30: repeat CT scan of abd, gen surgery called to re-evaluated pt.    Today:    REVIEW OF SYSTEMS:  Gen: No fever  EYES/ENT: No visual changes;  No vertigo or throat pain   NECK: No pain   RES:  No shortness of breath or Cough  CARD: No chest pain   GI: +abdominal pain. + diarrhea  : No dysuria  NEURO: No weakness  SKIN: No itching, rashes     Physical Exam:  Vital Signs Last 24 Hrs  T(C): 36.5 (01 Sep 2020 16:00), Max: 36.5 (01 Sep 2020 16:00)  T(F): 97.7 (01 Sep 2020 16:00), Max: 97.7 (01 Sep 2020 16:00)  HR: 104 (01 Sep 2020 20:00) (100 - 115)  BP: 120/84 (01 Sep 2020 19:00) (115/92 - 137/76)  BP(mean): 97 (01 Sep 2020 19:00) (90 - 108)  RR: 12 (01 Sep 2020 20:00) (10 - 45)  SpO2: 98% (01 Sep 2020 20:00) (93% - 100%)  Gen:  Awake, alert   CNS: non focal 	  Neck: no JVD  RES : clear , no wheezing            CVS: Regular  rhythm. Normal S1/S2  Abd: Soft, distended. Bowel sounds not present.  Skin: No rash.  Ext: no edema, A Line    ============================I/O===========================   I&O's Detail    31 Aug 2020 07:01  -  01 Sep 2020 07:00  --------------------------------------------------------  IN:    multiple electrolytes Injection Type 1multiple electrolytes Injection Type 1: 2880 mL    Solution: 66.4 mL    Solution: 640 mL    Solution: 300 mL    Solution: 350 mL  Total IN: 4236.4 mL    OUT:    Indwelling Catheter - Urethral: 1890 mL    Nasoenteral Tube: 100 mL  Total OUT: 1990 mL    Total NET: 2246.4 mL      01 Sep 2020 07:01  -  01 Sep 2020 20:46  --------------------------------------------------------  IN:    multiple electrolytes Injection Type 1multiple electrolytes Injection Type 1: 1200 mL    Solution: 23.1 mL    Solution: 100 mL    Solution: 675 mL    Solution: 150 mL  Total IN: 2148.1 mL    OUT:    Indwelling Catheter - Urethral: 1120 mL    Nasoenteral Tube: 150 mL  Total OUT: 1270 mL    Total NET: 878.1 mL        ============================ LABS =========================                        8.4    8.68  )-----------( 121      ( 01 Sep 2020 02:11 )             26.2     09-01    143  |  109<H>  |  85<H>  ----------------------------<  136<H>  3.6   |  22  |  2.24<H>    Ca    7.7<L>      01 Sep 2020 02:11  Phos  3.9     09-01  Mg     3.3     09-01    TPro  6.8  /  Alb  2.3<L>  /  TBili  0.6  /  DBili  x   /  AST  33  /  ALT  21  /  AlkPhos  79  09-01    LIVER FUNCTIONS - ( 01 Sep 2020 02:11 )  Alb: 2.3 g/dL / Pro: 6.8 g/dL / ALK PHOS: 79 U/L / ALT: 21 U/L / AST: 33 U/L / GGT: x         ======================Micro/Rad/Cardio=================  Culture: Reviewed   CXR: Reviewed  Echo: Reviewed  ======================================================  PAST MEDICAL & SURGICAL HISTORY:  H/O hemolytic anemia  H/O autoimmune hemolytic anemia  Knee pain, right  HLD (hyperlipidemia)  Former smoker  DVT of upper extremity (deep vein thrombosis)  Hepatitis C virus  GIB (gastrointestinal bleeding)  Ventricular fibrillation: s/p AICD  PAF (paroxysmal atrial fibrillation): on xarelto  Non-Ischemic Cardiomyopathy  SVT (Supraventricular Tachycardia)  HTN  CHF (Congestive Heart Failure)  S/P right heart catheterization: biopsy multiple  H/O heart transplant: 2/2018  Status post left hip replacement  History of Prior Ablation Treatment: for afib  AICD (Automatic Cardioverter/Defibrillator) Present: [employer] with 1 [employer] lead4/1/09- explanted and replaced with Medtronic 2 leads on 9/2/09    ====================ASSESSMENT ==============  Heart transplant 2/2018 for ACC/AHA stage D NICM   Resolved GI Bleed, Melena s/p EGD  Acute respiratory failure, resolved  Supraventricular tachycardia  Severe hypertension  Hyperlactatemia acidosis, resolved  Leukopenia- resolved  Acute blood loss anemia, stable   CKD Stage III  C. diff diarrhea  Klebsiella oxytoca bacteremia  Sepsis  Azotemia 2/2 CKD and Steroids   Pancolitis    Plan:  ====================== NEUROLOGY=====================  Nonfocal, continue to monitor neuro status   Continue PT, out of bed to chair as tolerated     ==================== RESPIRATORY======================  Supplemental O2 via NC   Encourage incentive spirometry, continue pulse ox monitoring, follow ABGs     ====================CARDIOVASCULAR==================  Heart transplant 2/2018 for ACC/AHA stage D NICM, now on solumedrol from prednisone   NPO for abd distention, off everolimus. Off cellcept while on high dose steroids   Continue hemodynamic monitoring   ASA on hold for hx of GI bleed/ ulceration     ===================HEMATOLOGIC/ONC ===================  Anemia s/p multiple blood transfusions  Monitor H&H and transfuse prn   Hx of GI bleed     VTE prophylaxis, heparin   Injectable 5000 Unit(s) SubCutaneous every 8 hours  ===================== RENAL =========================  NORMAN on CKD stage 3, Anasarca. lasix prn   Continue monitoring urine output via diaz, I&Os, BUN/Cr  Monitor and replete electrolytes prn     ==================== GASTROINTESTINAL===================  NPO in setting of abdominal distention   CT A/P on 8/30 with Pancolitis and small volume abdominopelvic ascites, not significantly changed since 8/20/2020.  General surgery following - continue serial abdominal exams.   Not eligible for fecal transplant at this time  Continue IVIG for a total of 5 days    GI prophylaxis, pantoprazole  Injectable 40 milliGRAM(s) IV Push two times a day  =======================    ENDOCRINE  =====================  Glucose control with Humalog sliding scale for stress hyperglycemia     insulin lispro (HumaLOG) corrective regimen sliding scale   SubCutaneous every 8 hours  methylPREDNISolone sodium succinate Injectable 16 milliGRAM(s) IV Push <User Schedule>    ========================INFECTIOUS DISEASE================  Klebsiella bacteremia from 8/13 which has since cleared  Severe Clostridium difficile, continue with vancomycin PO and metronidazole IV  Continue Rectal Vanco for worsening Cdiff presentation as per ID    Transplant prophylaxis with Posaconazole and Atovaquone on hold d/t ileus  Monitor Temp and WBC  Continue Cyclosporine, monitor levels     vancomycin    Solution 500 milliGRAM(s) Oral every 6 hours  vancomycin  Retention Enema 500 milliGRAM(s) Rectal every 6 hours        Patient requires continuous monitoring with bedside rhythm monitoring, arterial line, pulse oximetry; intermittent blood gas analysis. Care plan discussed with ICU care team. Patient remains critical; required more than usual ICU care.     By signing my name below, I, Jenni Fonseca, attest that this documentation has been prepared under the direction and in the presence of Jakob Maxwell MD.  Electronically signed: Katty Llamas, 09-01-20 @ 20:46    I, Jakob Maxwell, personally performed the services described in this documentation. all medical record entries made by the ramuibe were at my direction and in my presence. 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 Patient was treated w/ prednisone and Rituximab. Tacro was discontinued and patient was also transitioned to everolimus  Admitted  6/16-6/19  for hyperglycemia.  Reported to transplant team having one done black tarry stool-  instructed to  present to [facility] for hemolytic anemia. Patient has been following with Hematology outpatient.  Denies CP  N/V diarrhea SOB. (11 Aug 2020 20:08)    Hospital course:  8/11 Admitted to [facility] with symptomatic anemia  8/13 EGD for investigation of tarry stools  8/15 SB capsule: stomach & SB lesions, likely ulcers.  8/17 EGD/push enteroscopy w/ angioectasias/erosions in small bowel. Gastropathy and esophagitis. Ulcer seen on VCE most likely scope trauma.    8/18 VSS transferred back to floor.   8/20 VS 9.0/28.4 stable Gastric biopsy results LA Grade A esophagitis.  - Acute gastritis. Biopsies taken to r/o CMV, No ulceration seen despite finding on capsule endoscopy - likely 2/2 scope  trauma that has since resolved. Pathology pending.  8/21 VSS H/H  8.1/25.7  trending down will monitor prednisone taper 30 mg today. Procardia 120 initiated today RHC biopsy Monday.   8/22 VVS; Patient reports loose stools x 2-3 days with 1 episode today.  Stool sent for C-dif and GI PCR. Abdominal X-ray revealed: dilated loops of bowel. Abdomen distended.  Patient denies N/V. GI called to re-evaluate. Continue with current medication regimen.  Awaiting for upcoming cardiac biopsy on Monday 8/24.  8/23   vss    creat up to 2.28 ,  repeating CMP,  TTE ordered  Bladder scan   8/24   cardiac bx cancelled   elev creat  to 2.74   cardio renal cslt called,    + CDIF   inc vanco to 500 q6   ID following  8/25 VSS: RHC today as per transplant team; transfuse 2 units PRBC today as per Dr. Viramontes; continue vanco for cdiff; transfer patient to CTU after cath today   8/26. Dieretic challenge with good response   8/27: Downgraded to the floor then upgraded to the CTU again for concerning of abd distention   8/28 CT ABD & Pelvis reveals pancolitis  8/30: repeat CT scan of abd, gen surgery called to re-evaluated pt.    Today:   continue IV hydration of fluids, CVP elevated, hold IV fluid for now, Lasix 80 mg IV push  X 1   continue cyclosporin drip   IV IG  REVIEW OF SYSTEMS:  Gen: No fever  EYES/ENT: No visual changes;  No vertigo or throat pain   NECK: No pain   RES:  No shortness of breath or Cough  CARD: No chest pain   GI: +abdominal pain. + diarrhea  : No dysuria  NEURO: No weakness  SKIN: No itching, rashes     Physical Exam:  Vital Signs Last 24 Hrs  T(C): 36.5 (01 Sep 2020 16:00), Max: 36.5 (01 Sep 2020 16:00)  T(F): 97.7 (01 Sep 2020 16:00), Max: 97.7 (01 Sep 2020 16:00)  HR: 104 (01 Sep 2020 20:00) (100 - 115)  BP: 120/84 (01 Sep 2020 19:00) (115/92 - 137/76)  BP(mean): 97 (01 Sep 2020 19:00) (90 - 108)  RR: 12 (01 Sep 2020 20:00) (10 - 45)  SpO2: 98% (01 Sep 2020 20:00) (93% - 100%)  Gen:  Awake, alert   CNS: non focal 	  Neck: no JVD  RES : clear , no wheezing            CVS: Regular  rhythm. Normal S1/S2  Abd: Soft, distended. Bowel sounds not present.  Skin: No rash.  Ext: no edema, A Line    ============================I/O===========================   I&O's Detail    31 Aug 2020 07:01  -  01 Sep 2020 07:00  --------------------------------------------------------  IN:    multiple electrolytes Injection Type 1multiple electrolytes Injection Type 1: 2880 mL    Solution: 66.4 mL    Solution: 640 mL    Solution: 300 mL    Solution: 350 mL  Total IN: 4236.4 mL    OUT:    Indwelling Catheter - Urethral: 1890 mL    Nasoenteral Tube: 100 mL  Total OUT: 1990 mL    Total NET: 2246.4 mL      01 Sep 2020 07:01  -  01 Sep 2020 20:46  --------------------------------------------------------  IN:    multiple electrolytes Injection Type 1multiple electrolytes Injection Type 1: 1200 mL    Solution: 23.1 mL    Solution: 100 mL    Solution: 675 mL    Solution: 150 mL  Total IN: 2148.1 mL    OUT:    Indwelling Catheter - Urethral: 1120 mL    Nasoenteral Tube: 150 mL  Total OUT: 1270 mL    Total NET: 878.1 mL        ============================ LABS =========================                        8.4    8.68  )-----------( 121      ( 01 Sep 2020 02:11 )             26.2     09-01    143  |  109<H>  |  85<H>  ----------------------------<  136<H>  3.6   |  22  |  2.24<H>    Ca    7.7<L>      01 Sep 2020 02:11  Phos  3.9     09-01  Mg     3.3     09-01    TPro  6.8  /  Alb  2.3<L>  /  TBili  0.6  /  DBili  x   /  AST  33  /  ALT  21  /  AlkPhos  79  09-01    LIVER FUNCTIONS - ( 01 Sep 2020 02:11 )  Alb: 2.3 g/dL / Pro: 6.8 g/dL / ALK PHOS: 79 U/L / ALT: 21 U/L / AST: 33 U/L / GGT: x         ======================Micro/Rad/Cardio=================  Culture: Reviewed   CXR: Reviewed  Echo: Reviewed  ======================================================  PAST MEDICAL & SURGICAL HISTORY:  H/O hemolytic anemia  H/O autoimmune hemolytic anemia  Knee pain, right  HLD (hyperlipidemia)  Former smoker  DVT of upper extremity (deep vein thrombosis)  Hepatitis C virus  GIB (gastrointestinal bleeding)  Ventricular fibrillation: s/p AICD  PAF (paroxysmal atrial fibrillation): on xarelto  Non-Ischemic Cardiomyopathy  SVT (Supraventricular Tachycardia)  HTN  CHF (Congestive Heart Failure)  S/P right heart catheterization: biopsy multiple  H/O heart transplant: 2/2018  Status post left hip replacement  History of Prior Ablation Treatment: for afib  AICD (Automatic Cardioverter/Defibrillator) Present: [employer] with 1 [employer] [place] lead4/1/09- explanted and replaced with Medtronic 2 leads on 9/2/09    ====================ASSESSMENT ==============  Heart transplant 2/2018 for ACC/AHA stage D NICM   Resolved GI Bleed, Melena s/p EGD  Acute respiratory failure, resolved  Supraventricular tachycardia  Severe hypertension  Hyperlactatemia acidosis, resolved  Leukopenia- resolved  Acute blood loss anemia, stable   CKD Stage III  C. diff diarrhea  Klebsiella oxytoca bacteremia  Sepsis  Azotemia 2/2 CKD and Steroids   Pancolitis    Plan:  ====================== NEUROLOGY=====================  Nonfocal, continue to monitor neuro status   Continue PT, out of bed to chair as tolerated     ==================== RESPIRATORY======================  Supplemental O2 via NC   Encourage incentive spirometry, continue pulse ox monitoring, follow ABGs     ====================CARDIOVASCULAR==================  Heart transplant 2/2018 for ACC/AHA stage D NICM, now on solumedrol from prednisone   NPO for abd distention, off everolimus. Off cellcept while on high dose steroids   Continue hemodynamic monitoring   ASA on hold for hx of GI bleed/ ulceration     ===================HEMATOLOGIC/ONC ===================  Anemia s/p multiple blood transfusions  Monitor H&H and transfuse prn   Hx of GI bleed     VTE prophylaxis, heparin   Injectable 5000 Unit(s) SubCutaneous every 8 hours  ===================== RENAL =========================  NORMAN on CKD stage 3, Anasarca. lasix prn   Continue monitoring urine output via diaz, I&Os, BUN/Cr  Monitor and replete electrolytes prn     ==================== GASTROINTESTINAL===================  NPO in setting of abdominal distention   CT A/P on 8/30 with Pancolitis and small volume abdominopelvic ascites, not significantly changed since 8/20/2020.  General surgery following - continue serial abdominal exams.   Not eligible for fecal transplant at this time  Continue IVIG for a total of 5 days    GI prophylaxis, pantoprazole  Injectable 40 milliGRAM(s) IV Push two times a day  =======================    ENDOCRINE  =====================  Glucose control with Humalog sliding scale for stress hyperglycemia     insulin lispro (HumaLOG) corrective regimen sliding scale   SubCutaneous every 8 hours  methylPREDNISolone sodium succinate Injectable 16 milliGRAM(s) IV Push <User Schedule>    ========================INFECTIOUS DISEASE================  Klebsiella bacteremia from 8/13 which has since cleared  Severe Clostridium difficile, continue with vancomycin PO and metronidazole IV  Continue Rectal Vanco for worsening Cdiff presentation as per ID    Transplant prophylaxis with Posaconazole and Atovaquone on hold d/t ileus  Monitor Temp and WBC  Continue Cyclosporine, monitor levels     vancomycin    Solution 500 milliGRAM(s) Oral every 6 hours  vancomycin  Retention Enema 500 milliGRAM(s) Rectal every 6 hours        Patient requires continuous monitoring with bedside rhythm monitoring, arterial line, pulse oximetry; intermittent blood gas analysis. Care plan discussed with ICU care team. Patient remains critical; required more than usual ICU care.     By signing my name below, I, Jenni Fonseca, attest that this documentation has been prepared under the direction and in the presence of Jakob Maxwell MD.  Electronically signed: Ab Llamas, 09-01-20 @ 20:46    I, Jakob Maxwell, personally performed the services described in this documentation. all medical record entries made by the ab were at my direction and in my presence. 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 Patient was treated w/ prednisone and Rituximab. Tacro was discontinued and patient was also transitioned to everolimus  Admitted  6/16-6/19  for hyperglycemia.  Reported to transplant team having one done black tarry stool-  instructed to  present to [facility] for hemolytic anemia. Patient has been following with Hematology outpatient.  Denies CP  N/V diarrhea SOB. (11 Aug 2020 20:08)    Hospital course:  8/11 Admitted to [facility] with symptomatic anemia  8/13 EGD for investigation of tarry stools  8/15 SB capsule: stomach & SB lesions, likely ulcers.  8/17 EGD/push enteroscopy w/ angioectasias/erosions in small bowel. Gastropathy and esophagitis. Ulcer seen on VCE most likely scope trauma.    8/18 VSS transferred back to floor.   8/20 VS 9.0/28.4 stable Gastric biopsy results LA Grade A esophagitis.  - Acute gastritis. Biopsies taken to r/o CMV, No ulceration seen despite finding on capsule endoscopy - likely 2/2 scope  trauma that has since resolved. Pathology pending.  8/21 VSS H/H  8.1/25.7  trending down will monitor prednisone taper 30 mg today. Procardia 120 initiated today RHC biopsy Monday.   8/22 VVS; Patient reports loose stools x 2-3 days with 1 episode today.  Stool sent for C-dif and GI PCR. Abdominal X-ray revealed: dilated loops of bowel. Abdomen distended.  Patient denies N/V. GI called to re-evaluate. Continue with current medication regimen.  Awaiting for upcoming cardiac biopsy on Monday 8/24.  8/23   vss    creat up to 2.28 ,  repeating CMP,  TTE ordered  Bladder scan   8/24   cardiac bx cancelled   elev creat  to 2.74   cardio renal cslt called,    + CDIF   inc vanco to 500 q6   ID following  8/25 VSS: RHC today as per transplant team; transfuse 2 units PRBC today as per Dr. Viramontes; continue vanco for cdiff; transfer patient to CTU after cath today   8/26. Dieretic challenge with good response   8/27: Downgraded to the floor then upgraded to the CTU again for concerning of abd distention   8/28 CT ABD & Pelvis reveals pancolitis  8/30: repeat CT scan of abd, gen surgery called to re-evaluated pt.    Today:   continue IV hydration of fluids, CVP elevated, hold IV fluid for now, Lasix 80 mg IV push  X 1   continue cyclosporin drip   IV IG  REVIEW OF SYSTEMS:  Gen: No fever  EYES/ENT: No visual changes;  No vertigo or throat pain   NECK: No pain   RES:  No shortness of breath or Cough  CARD: No chest pain   GI: +abdominal pain. + diarrhea  : No dysuria  NEURO: No weakness  SKIN: No itching, rashes     Physical Exam:  Vital Signs Last 24 Hrs  T(C): 36.5 (01 Sep 2020 16:00), Max: 36.5 (01 Sep 2020 16:00)  T(F): 97.7 (01 Sep 2020 16:00), Max: 97.7 (01 Sep 2020 16:00)  HR: 104 (01 Sep 2020 20:00) (100 - 115)  BP: 120/84 (01 Sep 2020 19:00) (115/92 - 137/76)  BP(mean): 97 (01 Sep 2020 19:00) (90 - 108)  RR: 12 (01 Sep 2020 20:00) (10 - 45)  SpO2: 98% (01 Sep 2020 20:00) (93% - 100%)  Gen:  Awake, alert   CNS: non focal 	  Neck: no JVD  RES : clear , no wheezing            CVS: Regular  rhythm. Normal S1/S2  Abd: Soft, distended. Bowel sounds not present.  Skin: No rash.  Ext: no edema, A Line    ============================I/O===========================   I&O's Detail    31 Aug 2020 07:01  -  01 Sep 2020 07:00  --------------------------------------------------------  IN:    multiple electrolytes Injection Type 1multiple electrolytes Injection Type 1: 2880 mL    Solution: 66.4 mL    Solution: 640 mL    Solution: 300 mL    Solution: 350 mL  Total IN: 4236.4 mL    OUT:    Indwelling Catheter - Urethral: 1890 mL    Nasoenteral Tube: 100 mL  Total OUT: 1990 mL    Total NET: 2246.4 mL      01 Sep 2020 07:01  -  01 Sep 2020 20:46  --------------------------------------------------------  IN:    multiple electrolytes Injection Type 1multiple electrolytes Injection Type 1: 1200 mL    Solution: 23.1 mL    Solution: 100 mL    Solution: 675 mL    Solution: 150 mL  Total IN: 2148.1 mL    OUT:    Indwelling Catheter - Urethral: 1120 mL    Nasoenteral Tube: 150 mL  Total OUT: 1270 mL    Total NET: 878.1 mL        ============================ LABS =========================                        8.4    8.68  )-----------( 121      ( 01 Sep 2020 02:11 )             26.2     09-01    143  |  109<H>  |  85<H>  ----------------------------<  136<H>  3.6   |  22  |  2.24<H>    Ca    7.7<L>      01 Sep 2020 02:11  Phos  3.9     09-01  Mg     3.3     09-01    TPro  6.8  /  Alb  2.3<L>  /  TBili  0.6  /  DBili  x   /  AST  33  /  ALT  21  /  AlkPhos  79  09-01    LIVER FUNCTIONS - ( 01 Sep 2020 02:11 )  Alb: 2.3 g/dL / Pro: 6.8 g/dL / ALK PHOS: 79 U/L / ALT: 21 U/L / AST: 33 U/L / GGT: x         ======================Micro/Rad/Cardio=================  Culture: Reviewed   CXR: Reviewed  Echo: Reviewed  ======================================================  PAST MEDICAL & SURGICAL HISTORY:  H/O hemolytic anemia  H/O autoimmune hemolytic anemia  Knee pain, right  HLD (hyperlipidemia)  Former smoker  DVT of upper extremity (deep vein thrombosis)  Hepatitis C virus  GIB (gastrointestinal bleeding)  Ventricular fibrillation: s/p AICD  PAF (paroxysmal atrial fibrillation): on xarelto  Non-Ischemic Cardiomyopathy  SVT (Supraventricular Tachycardia)  HTN  CHF (Congestive Heart Failure)  S/P right heart catheterization: biopsy multiple  H/O heart transplant: 2/2018  Status post left hip replacement  History of Prior Ablation Treatment: for afib  AICD (Automatic Cardioverter/Defibrillator) Present: [employer] with 1 [employer] lead4/1/09- explanted and replaced with [employer] 2 leads on 9/2/09    ====================ASSESSMENT ==============  Heart transplant 2/2018 for ACC/AHA stage D NICM    abdominal distention  C diff colitis  Leukocytosis  Supraventricular tachycardia  Severe hypertension  Acute blood loss anemia, stable   CKD Stage III  C. diff diarrhea  Klebsiella oxytoca bacteremia  Sepsis  Azotemia 2/2 CKD and Steroids   Pancolitis    Plan:  ====================== NEUROLOGY=====================  Nonfocal, continue to monitor neuro status   Continue PT, out of bed to chair as tolerated     ==================== RESPIRATORY======================  Supplemental O2 via NC   Encourage incentive spirometry, continue pulse ox monitoring, follow ABGs     ====================CARDIOVASCULAR==================  Heart transplant 2/2018 for ACC/AHA stage D NICM, now on solumedrol from prednisone   NPO for abd distention, off everolimus. Off cellcept while on high dose steroids   Continue hemodynamic monitoring   ASA on hold for hx of GI bleed/ ulceration     ===================HEMATOLOGIC/ONC ===================  Anemia s/p multiple blood transfusions  Monitor H&H and transfuse prn   Hx of GI bleed     VTE prophylaxis, heparin   Injectable 5000 Unit(s) SubCutaneous every 8 hours  ===================== RENAL =========================  NORMAN on CKD stage 3, Anasarca. lasix prn   Continue monitoring urine output via diaz, I&Os, BUN/Cr  Monitor and replete electrolytes prn     ==================== GASTROINTESTINAL===================  NPO in setting of abdominal distention   CT A/P on 8/30 with Pancolitis and small volume abdominopelvic ascites, not significantly changed since 8/20/2020.  General surgery following - continue serial abdominal exams.   Not eligible for fecal transplant at this time  Continue IVIG for a total of 5 days    GI prophylaxis, pantoprazole  Injectable 40 milliGRAM(s) IV Push two times a day  =======================    ENDOCRINE  =====================  Glucose control with Humalog sliding scale for stress hyperglycemia     insulin lispro (HumaLOG) corrective regimen sliding scale   SubCutaneous every 8 hours  methylPREDNISolone sodium succinate Injectable 16 milliGRAM(s) IV Push <User Schedule>    ========================INFECTIOUS DISEASE================  Klebsiella bacteremia from 8/13 which has since cleared  Severe Clostridium difficile, continue with vancomycin PO and metronidazole IV  Continue Rectal Vanco for worsening Cdiff presentation as per ID    Transplant prophylaxis with Posaconazole and Atovaquone on hold d/t ileus  Monitor Temp and WBC  Continue Cyclosporine, monitor levels     vancomycin    Solution 500 milliGRAM(s) Oral every 6 hours  vancomycin  Retention Enema 500 milliGRAM(s) Rectal every 6 hours        Patient requires continuous monitoring with bedside rhythm monitoring, arterial line, pulse oximetry; intermittent blood gas analysis. Care plan discussed with ICU care team. Patient remains critical; required more than usual ICU care.  seen and examined multiple times.  Time spent 35 minutes    By signing my name below, I, Jenni Fonseca, attest that this documentation has been prepared under the direction and in the presence of Jakob Maxwell MD.  Electronically signed: Ab Llamas, 09-01-20 @ 20:46    I, Jakob Maxwell, personally performed the services described in this documentation. all medical record entries made by the ab were at my direction and in my presence. I have reviewed the chart and agree that the record reflects my personal performance and is accurate and complete  Electronically signed: Jenni Fonseca 09-01-20 @ 20:46

## 2020-09-01 NOTE — PROGRESS NOTE ADULT - SUBJECTIVE AND OBJECTIVE BOX
Behavioral Cardiology Psychological Assessment     History of present illness: Mr. Baez is a 70 year old man with history of dilated nonischemic cardiomyopathy, s/p OHT 2018, CKD III, HCV s/p treatment, and recently diagnosed autoimmune hemolytic anemia who is admitted with symptomatic anemia with Hgb of 6.6. Found to have chronic gastritis and small bowel ectasias. Has worsening abdominal distention with finding of ileus. CT scan with pancolitis. Started on IVIG , rectal vanc and Eravacylcine on .    Social history: , lives in a private house he owns in [place]. His younger brother (Rivas Davey) lives with him. Pt’s wife   and his only son was killed 5 years ago. He has an 19 y/o granddaughter that lives nearby with her mother and just started college this week. Reports a close relationship with his siblings (6 brothers and 1 sister) all but one brother live in [place]. Identified his adopted "godbrother" (Nawaf Barahona [phone]) as primary support person and HCP. Identified a close friend (Tahira) who lives close by as additional support and alternate HCP; she is very involved in his care. His brother (Rivas Davey age 45) lives with him. Mother  (age 79); father . He has 2 daughters from a previous relationship but is not part of their lives. Worked as a  at [employer] for 40+ years; retired 2 years ago. On disability. Information obtained from patient and chart. Education: 10th Grade (limited literacy skills).     Substance use:   Tobacco: Former, quit 50 years ago, smoked 2 cigarettes/day for 2-3 years.   Alcohol: Former alcohol use; stopped 6 yrs ago; drank 4-5 drinks of rum 5 days/week for 40 yrs.   Drug: Past occasional marijuana use for many years ago; no other nonprescription drug use.     Past psychiatric history: In past experienced depression related to bereavement issues following wife’s death () and son’s death (). Never sought treatment. Denies any other psychiatric issues. No history of s/a. No inpatient psychiatric admissions.     Psychological assessment:     Mental status exam: Seen lying in bed in CTU. Cooperative, well related with good eye contact. Awake, oriented x3 (unable to identify month, date). Speech normal rate/volume. Thought process logical. No evidence of any psychosis, jona, delusions. Mood "better." Affect less anxious. No s/i. Insight and judgment adequate. Behavioral Cardiology Psychological Assessment     History of present illness: Mr. Baez is a 70 year old man with history of dilated nonischemic cardiomyopathy, s/p OHT 2018, CKD III, HCV s/p treatment, and recently diagnosed autoimmune hemolytic anemia who is admitted with symptomatic anemia with Hgb of 6.6. Found to have chronic gastritis and small bowel ectasias. Has worsening abdominal distention with finding of ileus. CT scan with pancolitis. Started on IVIG , rectal vanc and Eravacylcine on .    Social history: , lives in a private house he owns in [place]. His younger brother (Rivas Davey) lives with him. Pt’s wife   and his only son was killed 5 years ago. He has an 17 y/o granddaughter that lives nearby with her mother and just started college this week. Reports a close relationship with his siblings (6 brothers and 1 sister) all but one brother live in [place]. Identified his adopted "godbrother" (Nawaf Barahona [phone]) as primary support person and HCP. Identified a close friend (Tahira) who lives close by as additional support and alternate HCP; she is very involved in his care. His brother (Rivas Davey age 45) lives with him. Mother  (age 79); father . He has 2 daughters from a previous relationship but is not part of their lives. Worked as a  at [employer] for 40+ years; retired 2 years ago. On disability. Information obtained from patient and chart. Education: 10th Grade (limited literacy skills).     Substance use:   Tobacco: Former, quit 50 years ago, smoked 2 cigarettes/day for 2-3 years.   Alcohol: Former alcohol use; stopped 6 yrs ago; drank 4-5 drinks of rum 5 days/week for 40 yrs.   Drug: Past occasional marijuana use for many years ago; no other nonprescription drug use.     Past psychiatric history: In past experienced depression related to bereavement issues following wife’s death () and son’s death (). Never sought treatment. Denies any other psychiatric issues. No history of s/a. No inpatient psychiatric admissions.     Psychological assessment: Mood improved today, less anxious. Continues to have concern about his health issues but coping better. Tired today. Napping during day. Receptive to support and validation. Coping strategies include his Yarsani pastora ([employer]), thinking about his granddaughter. Denies symptoms of depression. Denies s/i. Denies panic attacks.     Mental status exam: Seen lying in bed in CTU. Cooperative, well related with good eye contact. Awake, oriented x3 (unable to identify month, date). Speech normal rate/volume. Thought process logical. No evidence of any psychosis, jona, delusions. Mood "better." Affect less anxious. No s/i. Insight and judgment adequate.

## 2020-09-01 NOTE — PROGRESS NOTE ADULT - PROBLEM SELECTOR PLAN 3
- Improving with volume resuscitation. He is third spacing. We will not give diuretics at this time.

## 2020-09-01 NOTE — PROGRESS NOTE ADULT - ASSESSMENT
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 He has responded appropriately to a single blood transfusion. Of note, he recently has developed dark colored stools. Will investigate if anemia is due to GI bleeding in setting of steroid use or hemolysis and manage appropriately. Underwent UGI without bleeding source identified. Developed Klebsiella oxytoca bacteremia requiring transfer to CTU. Clinically improving, transferred to [facility], finished 10-day of ceftriaxone, however, developed severe C.diff colitis on Vancomycin 500mg q6h PO and IV Flagyl. He had RHC on 8/25 and found to have high filling pressure so transferred to CTU again.    #severe C.diff colitis with NORMAN- C.diff PCR detected (8/23). Repeat CT on 8/30 without evidence of toxic megacolon. Remains with appr 2 BMs/day with increasing consistency.  - Cont Vancomycin 500 mg per rectum every 6 hours  - continue PO Vanco 500 q 6h plus IV Flagyl 500 every 8 hours  - cont ERAVAYCLINE 1 mg/kg (750 mg)  every 12 hours  - cont IVIG x5 days  - not eligible for fecal transplant     #Klebsiella oxytoca bacteremia: resolved   Meropenem (8/14-8/17)  Cefepime (8/17-20)  Ceftriaxone (8/20-)  - Growing in blood cultures 2/2 sets on 8/13  - Repeat blood cultures x2 sets 8/14 no growth final  - s/p ceftriaxone until Monday 8/24 to finish a 10-day course  - blood cultures 8/25 NG    #OI prophylaxis-  -has been receiving high dose steroids since 5/20  -On IV cyclosporine now  -CMV viral load(8/17, 8/26): neg  -Valcyte and Bactrim d/c'ed on 8/17 due to leukopenia  -Galactomann<0.5, Fungitell<31  -Per hemoc, will taper prednisone every 7 days by 10mg  -Will consider IV bactrim for PCP ppx    #Pancytopenia- on admission  - leukopenia resolved, WBC stable. platelets improving  -COVID PCR neg  -Tick panel neg  - treating cdiff colitis    #Anemia- r/o GI bleeding  s/p EGD  EGD 8/17: esophagitis, acute gastritis s/p biopsy, no ulceration  On PPI 40mg daily  - cont to monitor cbc      Cm Infante MD  Fellow, Infectious Diseases, PGY-4  Pager: [phone]  After 5pm/Weekends: Call [phone]

## 2020-09-02 DIAGNOSIS — E43 UNSPECIFIED SEVERE PROTEIN-CALORIE MALNUTRITION: ICD-10-CM

## 2020-09-02 LAB
ALBUMIN SERPL ELPH-MCNC: 2.1 G/DL — LOW (ref 3.3–5)
ALP SERPL-CCNC: 84 U/L — SIGNIFICANT CHANGE UP (ref 40–120)
ALT FLD-CCNC: 20 U/L — SIGNIFICANT CHANGE UP (ref 10–45)
ANION GAP SERPL CALC-SCNC: 10 MMOL/L — SIGNIFICANT CHANGE UP (ref 5–17)
AST SERPL-CCNC: 39 U/L — SIGNIFICANT CHANGE UP (ref 10–40)
BILIRUB SERPL-MCNC: 0.7 MG/DL — SIGNIFICANT CHANGE UP (ref 0.2–1.2)
BUN SERPL-MCNC: 84 MG/DL — HIGH (ref 7–23)
CALCIUM SERPL-MCNC: 7.9 MG/DL — LOW (ref 8.4–10.5)
CHLORIDE SERPL-SCNC: 110 MMOL/L — HIGH (ref 96–108)
CO2 SERPL-SCNC: 23 MMOL/L — SIGNIFICANT CHANGE UP (ref 22–31)
CREAT SERPL-MCNC: 1.95 MG/DL — HIGH (ref 0.5–1.3)
CYCLOSPORINE SER-MCNC: 74 NG/ML — LOW (ref 150–400)
GLUCOSE BLDC GLUCOMTR-MCNC: 125 MG/DL — HIGH (ref 70–99)
GLUCOSE BLDC GLUCOMTR-MCNC: 168 MG/DL — HIGH (ref 70–99)
GLUCOSE BLDC GLUCOMTR-MCNC: 99 MG/DL — SIGNIFICANT CHANGE UP (ref 70–99)
GLUCOSE SERPL-MCNC: 111 MG/DL — HIGH (ref 70–99)
HCT VFR BLD CALC: 27.6 % — LOW (ref 39–50)
HGB BLD-MCNC: 8.7 G/DL — LOW (ref 13–17)
MAGNESIUM SERPL-MCNC: 3 MG/DL — HIGH (ref 1.6–2.6)
MCHC RBC-ENTMCNC: 29.5 PG — SIGNIFICANT CHANGE UP (ref 27–34)
MCHC RBC-ENTMCNC: 31.5 GM/DL — LOW (ref 32–36)
MCV RBC AUTO: 93.6 FL — SIGNIFICANT CHANGE UP (ref 80–100)
NRBC # BLD: 0 /100 WBCS — SIGNIFICANT CHANGE UP (ref 0–0)
PHOSPHATE SERPL-MCNC: 3.7 MG/DL — SIGNIFICANT CHANGE UP (ref 2.5–4.5)
PLATELET # BLD AUTO: 153 K/UL — SIGNIFICANT CHANGE UP (ref 150–400)
POTASSIUM SERPL-MCNC: 3.5 MMOL/L — SIGNIFICANT CHANGE UP (ref 3.5–5.3)
POTASSIUM SERPL-SCNC: 3.5 MMOL/L — SIGNIFICANT CHANGE UP (ref 3.5–5.3)
PREALB SERPL-MCNC: 10 MG/DL — LOW (ref 20–40)
PROT SERPL-MCNC: 7.5 G/DL — SIGNIFICANT CHANGE UP (ref 6–8.3)
RBC # BLD: 2.95 M/UL — LOW (ref 4.2–5.8)
RBC # FLD: 20.7 % — HIGH (ref 10.3–14.5)
SODIUM SERPL-SCNC: 143 MMOL/L — SIGNIFICANT CHANGE UP (ref 135–145)
TRIGL SERPL-MCNC: 116 MG/DL — SIGNIFICANT CHANGE UP (ref 10–149)
WBC # BLD: 12.12 K/UL — HIGH (ref 3.8–10.5)
WBC # FLD AUTO: 12.12 K/UL — HIGH (ref 3.8–10.5)

## 2020-09-02 PROCEDURE — 99291 CRITICAL CARE FIRST HOUR: CPT

## 2020-09-02 PROCEDURE — 71045 X-RAY EXAM CHEST 1 VIEW: CPT | Mod: 26,76

## 2020-09-02 PROCEDURE — 71045 X-RAY EXAM CHEST 1 VIEW: CPT | Mod: 26,77

## 2020-09-02 PROCEDURE — 99232 SBSQ HOSP IP/OBS MODERATE 35: CPT | Mod: GC

## 2020-09-02 PROCEDURE — 99292 CRITICAL CARE ADDL 30 MIN: CPT

## 2020-09-02 PROCEDURE — 99223 1ST HOSP IP/OBS HIGH 75: CPT

## 2020-09-02 RX ORDER — ELECTROLYTE SOLUTION,INJ
1 VIAL (ML) INTRAVENOUS
Refills: 0 | Status: DISCONTINUED | OUTPATIENT
Start: 2020-09-02 | End: 2020-09-02

## 2020-09-02 RX ORDER — I.V. FAT EMULSION 20 G/100ML
10.4 EMULSION INTRAVENOUS
Qty: 25 | Refills: 0 | Status: DISCONTINUED | OUTPATIENT
Start: 2020-09-02 | End: 2020-09-03

## 2020-09-02 RX ORDER — HYDRALAZINE HCL 50 MG
10 TABLET ORAL ONCE
Refills: 0 | Status: COMPLETED | OUTPATIENT
Start: 2020-09-02 | End: 2020-09-02

## 2020-09-02 RX ORDER — FUROSEMIDE 40 MG
40 TABLET ORAL ONCE
Refills: 0 | Status: COMPLETED | OUTPATIENT
Start: 2020-09-02 | End: 2020-09-02

## 2020-09-02 RX ADMIN — Medication 500 MILLIGRAM(S): at 20:15

## 2020-09-02 RX ADMIN — Medication 500 MILLIGRAM(S): at 01:28

## 2020-09-02 RX ADMIN — HEPARIN SODIUM 5000 UNIT(S): 5000 INJECTION INTRAVENOUS; SUBCUTANEOUS at 21:36

## 2020-09-02 RX ADMIN — Medication 500 MILLIGRAM(S): at 06:02

## 2020-09-02 RX ADMIN — HEPARIN SODIUM 5000 UNIT(S): 5000 INJECTION INTRAVENOUS; SUBCUTANEOUS at 06:00

## 2020-09-02 RX ADMIN — Medication 1 EACH: at 19:55

## 2020-09-02 RX ADMIN — IMMUNE GLOBULIN (HUMAN) 75 GRAM(S): 10 INJECTION INTRAVENOUS; SUBCUTANEOUS at 06:54

## 2020-09-02 RX ADMIN — Medication 40 MILLIGRAM(S): at 13:25

## 2020-09-02 RX ADMIN — Medication 500 MILLIGRAM(S): at 08:08

## 2020-09-02 RX ADMIN — PANTOPRAZOLE SODIUM 40 MILLIGRAM(S): 20 TABLET, DELAYED RELEASE ORAL at 18:05

## 2020-09-02 RX ADMIN — CHLORHEXIDINE GLUCONATE 1 APPLICATION(S): 213 SOLUTION TOPICAL at 05:59

## 2020-09-02 RX ADMIN — Medication 500 MILLIGRAM(S): at 17:34

## 2020-09-02 RX ADMIN — Medication 500 MILLIGRAM(S): at 12:36

## 2020-09-02 RX ADMIN — I.V. FAT EMULSION 10.4 ML/HR: 20 EMULSION INTRAVENOUS at 19:55

## 2020-09-02 RX ADMIN — PANTOPRAZOLE SODIUM 40 MILLIGRAM(S): 20 TABLET, DELAYED RELEASE ORAL at 06:00

## 2020-09-02 RX ADMIN — Medication 500 MILLIGRAM(S): at 00:00

## 2020-09-02 RX ADMIN — Medication 10 MILLIGRAM(S): at 16:38

## 2020-09-02 RX ADMIN — Medication 500 MILLIGRAM(S): at 13:25

## 2020-09-02 RX ADMIN — CYCLOSPORINE 2.1 MILLIGRAM(S): 100 CAPSULE ORAL at 13:26

## 2020-09-02 RX ADMIN — HEPARIN SODIUM 5000 UNIT(S): 5000 INJECTION INTRAVENOUS; SUBCUTANEOUS at 13:24

## 2020-09-02 RX ADMIN — Medication 1: at 21:51

## 2020-09-02 RX ADMIN — Medication 16 MILLIGRAM(S): at 08:08

## 2020-09-02 NOTE — PROGRESS NOTE ADULT - PROBLEM SELECTOR PLAN 3
- Improving. CVP yesterday evening was 25 mmHg. IV fluids were discontinued and he responded well to IV diuretics. We will continue to give diuretics as needed.

## 2020-09-02 NOTE — PROGRESS NOTE ADULT - ASSESSMENT
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 s/p EGD with esophagitis and gastritis. Developed Klebsiella oxytoca bacteremia requiring transfer to CTU. Clinically improving, transferred to [facility], finished 10-day of ceftriaxone, however, developed severe C.diff colitis on Vancomycin 500mg q6h PO and IV Flagyl. He had RHC on 8/25 and found to have high filling pressure so transferred to CTU again.    #severe C.diff colitis with NORMAN- C.diff PCR detected (8/23). Repeat CT on 8/30 without evidence of toxic megacolon. Endorses feeling better. Appears same as yesterday. 2 loose Bm/day  - increasing leukocytosis 8-->12  - cont vancomycin 500 mg per rectum every 6 hours  - cont PO Vanco 500 q 6h   - discontinued IV flagyl (8/24-9/1)  - cont ERAVAYCLINE 1 mg/kg (750 mg)  every 12 hours for total 7 days (8/31- 9/6)  - cont IVIG x5 days (8/29-9/1)  - not eligible for fecal transplant     #leukocytosis - 2/2 ongoing c diff vs other infection.  - blood culture with new fever  - low threshold to restart zosyn    #Klebsiella oxytoca bacteremia: resolved   Meropenem (8/14-8/17)  Cefepime (8/17-20)  Ceftriaxone (8/20-)  - Growing in blood cultures 2/2 sets on 8/13  - Repeat blood cultures x2 sets 8/14 no growth final  - s/p ceftriaxone until Monday 8/24 to finish a 10-day course  - blood cultures 8/25 NG    #OI prophylaxis-  -has been receiving high dose steroids since 5/20  -On IV cyclosporine now  -CMV viral load(8/17, 8/26): neg  -Valcyte and Bactrim d/c'ed on 8/17 due to leukopenia  -Galactomann<0.5, Fungitell<31  -Per hemoc, will taper prednisone every 7 days by 10mg  -Will consider IV bactrim for PCP ppx when able to tolerated PO    #Pancytopenia- on admission  - leukopenia resolved, WBC increasing. platelets improving  -COVID PCR neg  -Tick panel neg  - treating cdiff colitis    #Anemia- r/o GI bleeding  s/p EGD  EGD 8/17: esophagitis, acute gastritis s/p biopsy, no ulceration  On PPI 40mg daily  - cont to monitor cbc      Cm Infante MD  Fellow, Infectious Diseases, PGY-4  Pager: [phone]  After 5pm/Weekends: Call [phone]

## 2020-09-02 NOTE — PROGRESS NOTE ADULT - SUBJECTIVE AND OBJECTIVE BOX
Subjective: No overnight events. He says that he feels better today with less abdominal distention.     Medications:  benzocaine 15 mG/menthol 3.6 mG (Sugar-Free) Lozenge 1 Lozenge Oral every 6 hours PRN  chlorhexidine 2% Cloths 1 Application(s) Topical <User Schedule>  cycloSPORINE  (SandIMMUNE) IVPB 15 milliGRAM(s) IV Intermittent every 24 hours  Eravacycline (Xerava) 75 milliGRAM(s),Sodium Chloride 0.9% 150 milliLiter(s) 75 milliGRAM(s) IV Intermittent every 12 hours  heparin   Injectable 5000 Unit(s) SubCutaneous every 8 hours  immune   globulin 10% (GAMMAGARD) IVPB 60 Gram(s) IV Intermittent every 24 hours  insulin lispro (HumaLOG) corrective regimen sliding scale   SubCutaneous every 8 hours  methylPREDNISolone sodium succinate Injectable 16 milliGRAM(s) IV Push <User Schedule>  pantoprazole  Injectable 40 milliGRAM(s) IV Push two times a day  vancomycin    Solution 500 milliGRAM(s) Oral every 6 hours  vancomycin  Retention Enema 500 milliGRAM(s) Rectal every 6 hours      Physical Exam:    Vitals:  Vital Signs Last 24 Hrs  T(C): 36.1 (02 Sep 2020 04:00), Max: 36.8 (01 Sep 2020 20:00)  T(F): 97 (02 Sep 2020 04:00), Max: 98.2 (01 Sep 2020 20:00)  HR: 108 (02 Sep 2020 06:00) (100 - 115)  BP: 121/84 (02 Sep 2020 06:00) (110/77 - 137/76)  BP(mean): 98 (02 Sep 2020 06:00) (90 - 108)  RR: 19 (02 Sep 2020 06:00) (10 - 45)  SpO2: 98% (02 Sep 2020 06:00) (93% - 100%)      Daily Weight in k (02 Sep 2020 00:00)    I&O's Summary    01 Sep 2020 07:01  -  02 Sep 2020 07:00  --------------------------------------------------------  IN: 2246.2 mL / OUT: 2455 mL / NET: -208.8 mL    General: No distress. Comfortable.  HEENT: EOM intact.  Neck: Neck supple. JVP moderately elevated. No masses. CVP 13 mmHg.   Chest: Clear to auscultation bilaterally  CV: Tachycardic. Normal S1 and S2. No rubs or gallops. Radial pulses normal. 2+ edema. Right arm more edematous  (+4) than left.   Abdomen: Distended and tympanic, but non-tender  Skin: No rashes  Neurology: Alert and oriented times three. Sensation intact  Psych: Affect normal    Labs:                        8.7    12.12 )-----------( 153      ( 02 Sep 2020 01:36 )             27.6     09-02    143  |  110<H>  |  84<H>  ----------------------------<  111<H>  3.5   |  23  |  1.95<H>    Ca    7.9<L>      02 Sep 2020 01:36  Phos  3.7     09-02  Mg     3.0         TPro  7.5  /  Alb  2.1<L>  /  TBili  0.7  /  DBili  x   /  AST  39  /  ALT  20  /  AlkPhos  84  -02    Cyclosporine Level: 46: CYCLOSPORINE: Assay performed by Chemiluminescent Microparticle  Immunoassay (CMIA) on the [employer] system.  All immunoassay tests  are subject to rare interferences from heterophile antibodies so that if  atypical or unexpected results are obtained the lab should be notified to  confirm this result by an alternative method before adjusting medication  dosage. The therapeutic range of 150-400 ng/mL is based on trough levels  of Cyclosporine but levels for clinical management will vary depending on  the organ transplanted, post-dose time of draw, length of time  post-transplant and the individual patient's metabolism. ng/mL (20 @ 09:44)      Everolimus, Whole Blood Result: 5.3: -------------------ADDITIONAL INFORMATION-------------------  Target steady-state trough concentrations vary depending on  the type of transplant, concomitant immunosuppression,  clinical/institutional protocols, and time post-transplant.  Results should be interpreted in conjunction with this  clinical information and any physical signs/symptoms of  rejection/toxicity.  Testing performed by Liquid Chromatography-Tandem Mass  Spectrometry (LC-MS/MS).  This test was developed and its performance characteristics  determined by [facility] in a manner consistent with CLIA  requirements. This test has not been cleared or approved by  the U.S. Food and Drug Administration.  Test Performed by:  [place] [address]  : Marcus Caballero M.D. Ph.D.; CLIA# [id number] ng/mL (20 @ 16:09)

## 2020-09-02 NOTE — PROGRESS NOTE ADULT - ASSESSMENT
Mr. 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 He was treated with Rituximab and high dose steroids. He is currently admitted with symptomatic anemia with Hgb initially of 6.6 requiring blood transfusions. Evaluation was not consistent with hemolysis. EGD/enteroscopy eventually revealed chronic gastritis and small bowel ectasias.  His course was complicated by development of severe leukopenia and acute respiratory distress and profound sinus tachycardia on 8/13 in setting of Klebsiella bacteremia of unclear origin (preceded EGD). A CT scan of the chest showed a possible infiltrate. While his bacteremia was treated, he subsequently developed C diff colitis with severe abdominal distention. He also developed worsening acute on chronic renal failure, likely due to volume depletion. He has some ongoing improvement in abdominal distention today and has improvement in his urine output with addition of furosemide last night.      Plan discussed in multidisciplinary rounds with CTU team and CT surgery.

## 2020-09-02 NOTE — CHART NOTE - NSCHARTNOTEFT_GEN_A_CORE
Nutrition Follow Up Note     Patient seen for: nutrition/TPN follow up     Source: medical record, TPN Team rounds    Chart reviewed, events noted. 70 year old male with PMHx of NICM, s/p HM2 LVAD and OHT in 2/2018, with known coronary fistula. Recent admission for hemolytic anemia, now admitted for hyperglycemia and GIB. EGD and capsule study negative. Pt now C-Diff colitis w/o toxic megacolon. Pt receiving Vanco retention enema and Flagyl. Pt getting IVIG x5 days. Pt being followed by surgery team, no intervention at this time.  Per ID, Fecal mater transplant not an option at this time. TPN team consulted for possible initiation of TPN as pt with anticipated prolonged NPO status. See below for TPN recommendations.     Nutrition Status: Pt diagnosed with severe protein-calorie malnutrition on 9/1. Has been NPO since 8/27 (day 7).    Diet Order: Diet, NPO:   Except Medications (08-27-20 @ 20:17) (day 7)    Per discussion with TPN team, plan to initiate TPN this evening. Goal TPN prescription: 135 Gm amino acids, 280 Gm dextrose and 50 Gm SMOF lipids. To provide: 1992kcal and 135g protein. Based on dosing wt 75.2kg, provides: 26.5kcal/kg and 1.8g protein/kg.     Non-Protein Calories: 1492 kcal/day (19.3 addy/Kg)  Dextrose Infusion Rate: 2.6 mg/Kg/min  Lipid Infusion Rate: 0.6 Gm/Kg/day; 0.06Gm/K.g/hr     Electrolytes and Insulin: PENDING   Insulin (units):   NaCl (mEq):   Na acetate (mEq):   Na Phos (mmol):   KCL (mEq):   Calcium gluconate (mEq):   Mg sulfate (mEq):    MTE-5 concentrate (ml): 1  MVI (ml): 10     NGT output x 24-hours:   (9/2): 150ml   (9/1): 250ml   (8/31): 100ml     Last BM: (9/1): x 1    Anthrpometric Measurements:   Height (cm): 170.18 (08-17-20 @ 14:29), 172.72 (06-16-20 @ 17:18)  Weight (kg): 75.2 (08-17-20 @ 14:29), 73 (06-16-20 @ 17:18)  BMI (kg/m2): 26 (08-17-20 @ 14:29), 24.5 (06-16-20 @ 17:18)    Medications: MEDICATIONS  (STANDING):  chlorhexidine 2% Cloths 1 Application(s) Topical <User Schedule>  cycloSPORINE  (SandIMMUNE) IVPB 15 milliGRAM(s) IV Intermittent every 24 hours  Eravacycline (Xerava) 75 milliGRAM(s),Sodium Chloride 0.9% 150 milliLiter(s) 75 milliGRAM(s) IV Intermittent every 12 hours  heparin   Injectable 5000 Unit(s) SubCutaneous every 8 hours  immune   globulin 10% (GAMMAGARD) IVPB 60 Gram(s) IV Intermittent every 24 hours  insulin lispro (HumaLOG) corrective regimen sliding scale   SubCutaneous every 8 hours  methylPREDNISolone sodium succinate Injectable 16 milliGRAM(s) IV Push <User Schedule>  pantoprazole  Injectable 40 milliGRAM(s) IV Push two times a day  vancomycin    Solution 500 milliGRAM(s) Oral every 6 hours  vancomycin  Retention Enema 500 milliGRAM(s) Rectal every 6 hours    MEDICATIONS  (PRN):  benzocaine 15 mG/menthol 3.6 mG (Sugar-Free) Lozenge 1 Lozenge Oral every 6 hours PRN Sore Throat    Labs: 09-02 @ 01:36: Sodium 143, Potassium 3.5, Calcium 7.9<L>, Magnesium 3.0<H>, Phosphorus 3.7, BUN 84<H>, Creatinine 1.95<H>, Glucose 111<H>, Total Bilirubin 0.7, Prealbumin --, Albumin, 2.1<L>, C-Reactive Protein --    CBC ( 02 Sep 2020 01:36 )  WBC Count : 12.12 K/uL  RBC Count : 2.95 M/uL  Hemoglobin : 8.7 g/dL  Hematocrit : 27.6 %  Platelet Count - Automated : 153 K/uL  Mean Cell Volume : 93.6 fl  Mean Cell Hemoglobin : 29.5 pg  Mean Cell Hemoglobin Concentration :31.5 gm/dL    LIVER FUNCTIONS - ( 02 Sep 2020 01:36 )  Alb: 2.1 g/dL / Pro: 7.5 g/dL / ALK PHOS: 84 U/L / ALT: 20 U/L / AST: 39 U/L / GGT: x           Triglycerides, Serum: 116 mg/dL (09-02-20 @ 01:36)    Hemoglobin A1C     POCT Blood Glucose.: 99 mg/dL (09-02-20 @ 06:58)  POCT Blood Glucose.: 113 mg/dL (09-01-20 @ 21:04)  POCT Blood Glucose.: 131 mg/dL (09-01-20 @ 14:11)    Skin: no pressure injuries noted  Edema: 1+ generalized;  2+ left arm;  left hand; 3+ right arm;  right hand    Estimated Needs: based on 75.2 Kg, with consideration for TPN  Energy: (25-30kcal/kg): 1880-2256kcal   Protein:  (1.6-1.8g protein/kg): 120-135g protein    Previous Nutrition Diagnosis: altered nutrition lab values  Nutrition Diagnosis is: defer at this time     Previous Nutrition Diagnosis: inadequate oral intake  Nutrition Diagnosis is: on going at this time.     Previous Nutrition Diagnosis: acute severe protein calorie malnutrition  Nutrition Diagnosis: remains appropriate, ongoing with pending initiation of TPN    New Nutrition Diagnosis:      Recommended Interventions:   1. TPN per TPN Team/Nutrition Assessment  2. Defer diet advancement to medical team. Monitor GI function.  3. Trend BG levels, renal indices, LFT's, electrolytes and triglycerides     Monitoring and Evaluation:   Continue to monitor nutrition provision and tolerance, weights, labs, skin integrity.   RD remains available upon request and will follow up per protocol.    Alison Kleiner, RD [facility]; pager number [phone] Nutrition Follow Up Note     Patient seen for: nutrition/TPN follow up     Source: medical record, TPN Team rounds    Chart reviewed, events noted. 70 year old male with PMHx of NICM, s/p HM2 LVAD and OHT in 2/2018, with known coronary fistula. Recent admission for hemolytic anemia, now admitted for hyperglycemia and GIB. EGD and capsule study negative. Pt now C-Diff colitis w/o toxic megacolon. Pt receiving Vanco retention enema and Flagyl. Pt getting IVIG x5 days. Pt being followed by surgery team, no intervention at this time.  Per ID, Fecal mater transplant not an option at this time. TPN team consulted for possible initiation of TPN as pt with anticipated prolonged NPO status. See below for TPN recommendations.     Nutrition Status: Pt diagnosed with severe protein-calorie malnutrition on 9/1. Has been NPO since 8/27 (day 7).    Diet Order: Diet, NPO:   Except Medications (08-27-20 @ 20:17) (day 7)    Per discussion with TPN team, plan to initiate TPN this evening. Goal TPN prescription: 135 Gm amino acids, 280 Gm dextrose and 50 Gm SMOF lipids. To provide: 1992kcal and 135g protein. Based on dosing wt 75.2kg, provides: 26.5kcal/kg and 1.8g protein/kg.     Non-Protein Calories: 1492 kcal/day (19.3 addy/Kg)  Dextrose Infusion Rate: 2.6 mg/Kg/min  Lipid Infusion Rate: 0.6 Gm/Kg/day; 0.06Gm/K.g/hr     Electrolytes and Insulin:   Insulin (units): none at this time  NaCl (mEq): 40  Na acetate (mEq): 40  Na Phos (mmol): 30  KCL (mEq): 60  Calcium gluconate (mEq): 10   Mg sulfate (mEq):  9  MTE-5 concentrate (ml): 1  MVI (ml): 10     NGT output x 24-hours:   (9/2): 150ml   (9/1): 250ml   (8/31): 100ml     Last BM: (9/1): x 1    Anthrpometric Measurements:   Height (cm): 170.18 (08-17-20 @ 14:29), 172.72 (06-16-20 @ 17:18)  Weight (kg): 75.2 (08-17-20 @ 14:29), 73 (06-16-20 @ 17:18)  BMI (kg/m2): 26 (08-17-20 @ 14:29), 24.5 (06-16-20 @ 17:18)    Medications: MEDICATIONS  (STANDING):  chlorhexidine 2% Cloths 1 Application(s) Topical <User Schedule>  cycloSPORINE  (SandIMMUNE) IVPB 15 milliGRAM(s) IV Intermittent every 24 hours  Eravacycline (Xerava) 75 milliGRAM(s),Sodium Chloride 0.9% 150 milliLiter(s) 75 milliGRAM(s) IV Intermittent every 12 hours  heparin   Injectable 5000 Unit(s) SubCutaneous every 8 hours  immune   globulin 10% (GAMMAGARD) IVPB 60 Gram(s) IV Intermittent every 24 hours  insulin lispro (HumaLOG) corrective regimen sliding scale   SubCutaneous every 8 hours  methylPREDNISolone sodium succinate Injectable 16 milliGRAM(s) IV Push <User Schedule>  pantoprazole  Injectable 40 milliGRAM(s) IV Push two times a day  vancomycin    Solution 500 milliGRAM(s) Oral every 6 hours  vancomycin  Retention Enema 500 milliGRAM(s) Rectal every 6 hours    MEDICATIONS  (PRN):  benzocaine 15 mG/menthol 3.6 mG (Sugar-Free) Lozenge 1 Lozenge Oral every 6 hours PRN Sore Throat    Labs: 09-02 @ 01:36: Sodium 143, Potassium 3.5, Calcium 7.9<L>, Magnesium 3.0<H>, Phosphorus 3.7, BUN 84<H>, Creatinine 1.95<H>, Glucose 111<H>, Total Bilirubin 0.7, Prealbumin --, Albumin, 2.1<L>, C-Reactive Protein --    CBC ( 02 Sep 2020 01:36 )  WBC Count : 12.12 K/uL  RBC Count : 2.95 M/uL  Hemoglobin : 8.7 g/dL  Hematocrit : 27.6 %  Platelet Count - Automated : 153 K/uL  Mean Cell Volume : 93.6 fl  Mean Cell Hemoglobin : 29.5 pg  Mean Cell Hemoglobin Concentration :31.5 gm/dL    LIVER FUNCTIONS - ( 02 Sep 2020 01:36 )  Alb: 2.1 g/dL / Pro: 7.5 g/dL / ALK PHOS: 84 U/L / ALT: 20 U/L / AST: 39 U/L / GGT: x           Triglycerides, Serum: 116 mg/dL (09-02-20 @ 01:36)    Hemoglobin A1C     POCT Blood Glucose.: 99 mg/dL (09-02-20 @ 06:58)  POCT Blood Glucose.: 113 mg/dL (09-01-20 @ 21:04)  POCT Blood Glucose.: 131 mg/dL (09-01-20 @ 14:11)    Skin: no pressure injuries noted  Edema: 1+ generalized;  2+ left arm;  left hand; 3+ right arm;  right hand    Estimated Needs: based on 75.2 Kg, with consideration for TPN  Energy: (25-30kcal/kg): 1880-2256kcal   Protein:  (1.6-1.8g protein/kg): 120-135g protein    Previous Nutrition Diagnosis: altered nutrition lab values  Nutrition Diagnosis is: defer at this time     Previous Nutrition Diagnosis: inadequate oral intake  Nutrition Diagnosis is: on going at this time.     Previous Nutrition Diagnosis: acute severe protein calorie malnutrition  Nutrition Diagnosis: remains appropriate, ongoing with pending initiation of TPN    New Nutrition Diagnosis:      Recommended Interventions:   1. TPN per TPN Team/Nutrition Assessment  2. Defer diet advancement to medical team. Monitor GI function.  3. Trend BG levels, renal indices, LFT's, electrolytes and triglycerides     Monitoring and Evaluation:   Continue to monitor nutrition provision and tolerance, weights, labs, skin integrity.   RD remains available upon request and will follow up per protocol.    Alison Kleiner, RD [facility]; pager number [phone] Nutrition Follow Up Note     Patient seen for: nutrition/TPN follow up     Source: medical record, TPN Team rounds    Chart reviewed, events noted. 70 year old male with PMHx of NICM, s/p HM2 LVAD and OHT in 2/2018, with known coronary fistula. Recent admission for hemolytic anemia, now admitted for hyperglycemia and GIB. EGD and capsule study negative. Pt now C-Diff colitis w/o toxic megacolon. Pt receiving Vanco retention enema and Flagyl. Pt getting IVIG x5 days. Pt being followed by surgery team, no intervention at this time.  Per ID, Fecal mater transplant not an option at this time. TPN team consulted for possible initiation of TPN as pt with anticipated prolonged NPO status. See below for TPN recommendations.     Nutrition Status: Pt diagnosed with severe protein-calorie malnutrition on 9/1. Has been NPO since 8/27 (day 7).    Diet Order: Diet, NPO:   Except Medications (08-27-20 @ 20:17) (day 7)    Per discussion with TPN team, plan to initiate TPN this evening. Goal TPN prescription: 135 Gm amino acids, 280 Gm dextrose and 50 Gm SMOF lipids. To provide: 1992kcal and 135g protein. Based on dosing wt 75.2kg, provides: 26.5kcal/kg and 1.8g protein/kg.     Non-Protein Calories: 1492 kcal/day (19.3 addy/Kg)  Dextrose Infusion Rate: 2.6 mg/Kg/min  Lipid Infusion Rate: 0.6 Gm/Kg/day; 0.06Gm/K.g/hr     Electrolytes and Insulin:   Insulin (units): 5  NaCl (mEq): 40  Na acetate (mEq): 40  Na Phos (mmol): 30  KCL (mEq): 60  Calcium gluconate (mEq): 10   Mg sulfate (mEq):  9  MTE-5 concentrate (ml): 1  MVI (ml): 10     NGT output x 24-hours:   (9/2): 150ml   (9/1): 250ml   (8/31): 100ml     Last BM: (9/1): x 1    Anthrpometric Measurements:   Height (cm): 170.18 (08-17-20 @ 14:29), 172.72 (06-16-20 @ 17:18)  Weight (kg): 75.2 (08-17-20 @ 14:29), 73 (06-16-20 @ 17:18)  BMI (kg/m2): 26 (08-17-20 @ 14:29), 24.5 (06-16-20 @ 17:18)    Medications: MEDICATIONS  (STANDING):  chlorhexidine 2% Cloths 1 Application(s) Topical <User Schedule>  cycloSPORINE  (SandIMMUNE) IVPB 15 milliGRAM(s) IV Intermittent every 24 hours  Eravacycline (Xerava) 75 milliGRAM(s),Sodium Chloride 0.9% 150 milliLiter(s) 75 milliGRAM(s) IV Intermittent every 12 hours  heparin   Injectable 5000 Unit(s) SubCutaneous every 8 hours  immune   globulin 10% (GAMMAGARD) IVPB 60 Gram(s) IV Intermittent every 24 hours  insulin lispro (HumaLOG) corrective regimen sliding scale   SubCutaneous every 8 hours  methylPREDNISolone sodium succinate Injectable 16 milliGRAM(s) IV Push <User Schedule>  pantoprazole  Injectable 40 milliGRAM(s) IV Push two times a day  vancomycin    Solution 500 milliGRAM(s) Oral every 6 hours  vancomycin  Retention Enema 500 milliGRAM(s) Rectal every 6 hours    MEDICATIONS  (PRN):  benzocaine 15 mG/menthol 3.6 mG (Sugar-Free) Lozenge 1 Lozenge Oral every 6 hours PRN Sore Throat    Labs: 09-02 @ 01:36: Sodium 143, Potassium 3.5, Calcium 7.9<L>, Magnesium 3.0<H>, Phosphorus 3.7, BUN 84<H>, Creatinine 1.95<H>, Glucose 111<H>, Total Bilirubin 0.7, Prealbumin --, Albumin, 2.1<L>, C-Reactive Protein --    CBC ( 02 Sep 2020 01:36 )  WBC Count : 12.12 K/uL  RBC Count : 2.95 M/uL  Hemoglobin : 8.7 g/dL  Hematocrit : 27.6 %  Platelet Count - Automated : 153 K/uL  Mean Cell Volume : 93.6 fl  Mean Cell Hemoglobin : 29.5 pg  Mean Cell Hemoglobin Concentration :31.5 gm/dL    LIVER FUNCTIONS - ( 02 Sep 2020 01:36 )  Alb: 2.1 g/dL / Pro: 7.5 g/dL / ALK PHOS: 84 U/L / ALT: 20 U/L / AST: 39 U/L / GGT: x           Triglycerides, Serum: 116 mg/dL (09-02-20 @ 01:36)    Hemoglobin A1C     POCT Blood Glucose.: 99 mg/dL (09-02-20 @ 06:58)  POCT Blood Glucose.: 113 mg/dL (09-01-20 @ 21:04)  POCT Blood Glucose.: 131 mg/dL (09-01-20 @ 14:11)    Skin: no pressure injuries noted  Edema: 1+ generalized;  2+ left arm;  left hand; 3+ right arm;  right hand    Estimated Needs: based on 75.2 Kg, with consideration for TPN  Energy: (25-30kcal/kg): 1880-2256kcal   Protein:  (1.6-1.8g protein/kg): 120-135g protein    Previous Nutrition Diagnosis: altered nutrition lab values  Nutrition Diagnosis is: defer at this time     Previous Nutrition Diagnosis: inadequate oral intake  Nutrition Diagnosis is: on going at this time.     Previous Nutrition Diagnosis: acute severe protein calorie malnutrition  Nutrition Diagnosis: remains appropriate, ongoing with pending initiation of TPN    New Nutrition Diagnosis:      Recommended Interventions:   1. TPN per TPN Team/Nutrition Assessment  2. Defer diet advancement to medical team. Monitor GI function.  3. Trend BG levels, renal indices, LFT's, electrolytes and triglycerides     Monitoring and Evaluation:   Continue to monitor nutrition provision and tolerance, weights, labs, skin integrity.   RD remains available upon request and will follow up per protocol.    Alison Kleiner, RD [facility]; pager number [phone]

## 2020-09-02 NOTE — PROGRESS NOTE ADULT - ASSESSMENT
70y old  Male with pmhx of  NICM s/p HM2 s/p OHT in 2018, autoimmune hemolytic anemia s/p steroids/rituxan, admitted for GIB c/b bacteremia, cdiff, and now NORMAN.    #NORMAN   - non oliguric NORMAN like multifactorial 2/2 Cdiff infection, everolimus   - His baseline Scr ~1mg/dl, peaked at 3 mg/dl on 8/25  - sCr improving 3> 2.7>2.2 >> 1.9  - RHC on 8/25 with elevated pressures, given 2u pRBC and 40mg IV lasix with good response 8/25-26  - currently monitoring off diuretics with good UOP, continue monitor UOP.   - F/u everolimus level, may need to adjust dose in setting of diarrhea/NORMAN  - Monitor UOP and daily weights. Dose medications as per eGFR<20    #Anemia  - 2/2 GIB, s/p 2 units on 8/25 with appropriate response  - Iron stores low, cannot give IV iron in setting of active infection  - pRBC transfusions per primary team  - No signs of active hemolysis    At this time Nephrology team will sign off, reconsult as needed

## 2020-09-02 NOTE — CONSULT NOTE ADULT - SUBJECTIVE AND OBJECTIVE BOX
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 Patient was treated w/ prednisone and Rituximab. Tacro was discontinued and patient was also transitioned to everolimus  Admitted  6/16-6/19  for hyperglycemia.  Reported to transplant team having one done black tarry stool-  instructed to  present to [facility] for hemolytic anemia. Patient has been following with Hematology outpatient.  Denies CP  N/V diarrhea SOB.   General surgery was consulted when pt developed increased over abdominal distention, Patient with C. Diff. Patient denies N/V Denies difficulty moving bowels. Denies acute changes in size of his abdomen. Denies abdominal pain. Pt subsequently developed ileus.   Currently pt remains in CTU without GI function & (+)NGT for 1week.    N/V/D,  BM/ Flatus,   NGT,     Papl/ sob/dyspnea/ cp,       F/C/S  TPN consult requested to assist w/ management of pt's nutrition while being NPO for a prolonged period of time.  All questions asked and answered to pt's satisfaction.    Current Diet: Diet, NPO:   Except Medications (08-27-20 @ 20:17)      Appetite: [ x ]Poor [  ]Adequate [  ]Good  Caloric intake:  [   ]  Adequate   [ x  ] Inadequate      MEDICATIONS  (STANDING):  chlorhexidine 2% Cloths 1 Application(s) Topical <User Schedule>  cycloSPORINE  (SandIMMUNE) IVPB 15 milliGRAM(s) IV Intermittent every 24 hours  Eravacycline (Xerava) 75 milliGRAM(s),Sodium Chloride 0.9% 150 milliLiter(s) 75 milliGRAM(s) IV Intermittent every 12 hours  heparin   Injectable 5000 Unit(s) SubCutaneous every 8 hours  immune   globulin 10% (GAMMAGARD) IVPB 60 Gram(s) IV Intermittent every 24 hours  insulin lispro (HumaLOG) corrective regimen sliding scale   SubCutaneous every 8 hours  methylPREDNISolone sodium succinate Injectable 16 milliGRAM(s) IV Push <User Schedule>  pantoprazole  Injectable 40 milliGRAM(s) IV Push two times a day  vancomycin    Solution 500 milliGRAM(s) Oral every 6 hours  vancomycin  Retention Enema 500 milliGRAM(s) Rectal every 6 hours    MEDICATIONS  (PRN):  benzocaine 15 mG/menthol 3.6 mG (Sugar-Free) Lozenge 1 Lozenge Oral every 6 hours PRN Sore Throat      PAST MEDICAL & SURGICAL HISTORY:  hemolytic anemia  autoimmune hemolytic anemia  HLD (hyperlipidemia)  DVT of upper extremity (deep vein thrombosis)  Hepatitis C virus  CKD  GIB (gastrointestinal bleeding)  Ventricular fibrillation: s/p AICD  PAF (paroxysmal atrial fibrillation): on xarelto, s/p Ablation Treatment:   Non-Ischemic Cardiomyopathy  SVT (Supraventricular Tachycardia)  HTN  CHF (Congestive Heart Failure)  S/P right heart catheterization: biopsy multiple  s/p heart transplant: 2/2018  S/p left hip replacement  s/p AICD (Automatic Cardioverter/Defibrillator)     FAMILY HISTORY:  No pertinent family history in first degree relatives      Social History: / lives w/ brother; former smoker, Denies current smoking, ETOH, drugs    ALLERGIES  No Known Allergies        ROS: General/ GI see HPI  all other systems negative    VITALS  T(C): 36.4 (09-02-20 @ 07:00), Max: 36.8 (09-01-20 @ 20:00)  HR: 100 (09-02-20 @ 09:12) (100 - 119)  BP: 125/85 (09-02-20 @ 09:12) (106/77 - 137/76)  RR: 13 (09-02-20 @ 09:12) (10 - 45)  SpO2: 97% (09-02-20 @ 09:12) (93% - 99%)  I&O's Detail    01 Sep 2020 07:01  -  02 Sep 2020 07:00  --------------------------------------------------------  IN:    multiple electrolytes Injection Type 1multiple electrolytes Injection Type 1: 1200 mL    Solution: 46.2 mL    Solution: 100 mL    Solution: 750 mL    Solution: 300 mL  Total IN: 2396.2 mL    OUT:    Indwelling Catheter - Urethral: 2155 mL    Nasoenteral Tube: 300 mL  Total OUT: 2455 mL    Total NET: -58.8 mL      02 Sep 2020 07:01  -  02 Sep 2020 09:49  --------------------------------------------------------  IN:    Enteral Tube Flush: 30 mL    Solution: 150 mL    Solution: 6.3 mL  Total IN: 186.3 mL    OUT:    Indwelling Catheter - Urethral: 150 mL  Total OUT: 150 mL    Total NET: 36.3 mL    PHYSICAL EXAM:  GEN:  guarded but stable, WD/WN/WG, (+)NGT  HEENT: NC/AT, PERRL, mucosa moist & throat clear, no trachea midline w/ neck supple  Resp: CTA, well healed scar  CV:  RRR  GI: (-) BS,  Soft, distended/ nontender (+)tympanitic   MSK:  FROM x4, no deformities  VASCULAR: Equally warm, no cyanosis, clubbing, nor edema  Neurology; no focal deficits, weakened strength & sensation grossly intact  Psych: normal affect  Skin: warm,  moist, & w/ good turgor      LABS:                        8.7    12.12 )-----------( 153      ( 02 Sep 2020 01:36 )             27.6     09-02    143  |  110<H>  |  84<H>  ----------------------------<  111<H>  3.5   |  23  |  1.95<H>    Ca    7.9<L>      02 Sep 2020 01:36  Phos  3.7     09-02  Mg     3.0     09-02    TPro  7.5  /  Alb  2.1<L>  /  TBili  0.7  /  DBili  x   /  AST  39  /  ALT  20  /  AlkPhos  84  09-02    Ionized Calcium Levels:   Blood Gas Calcium, Ionized - Venous: 1.10 mmoL/L (08.30.20 @ 22:08)      CAPILLARY BLOOD GLUCOSE  POCT Blood Glucose.: 99 mg/dL (02 Sep 2020 06:58)  POCT Blood Glucose.: 113 mg/dL (01 Sep 2020 21:04)  POCT Blood Glucose.: 131 mg/dL (01 Sep 2020 14:11)      A1C with Estimated Average Glucose: 8.2 % (06-16-20 @ 15:40)    Prealbumin, Serum: 24 mg/dL (08.25.20 @ 15:10)    Triglycerides, Serum: 33 mg/dL (06.07.17 @ 06:14)    Cyclosporine Level: 46: CYCLOSPORINE: Assay performed by Chemiluminescent Microparticle  Immunoassay (CMIA) on the [employer]  system.  All immunoassay tests  are subject to rare interferences from heterophile antibodies so that if  atypical or unexpected results are obtained the lab should be notified to  confirm this result by an alternative method before adjusting medication  dosage. The therapeutic range of 150-400 ng/mL is based on trough levels  of Cyclosporine but levels for clinical management will vary depending on  the organ transplanted, post-dose time of draw, length of time  post-transplant and the individual patient's metabolism. ng/mL (09.01.20 @ 09:44)        CULTURES:  Culture - Blood (08.25.20 @ 23:14)    Specimen Source: .Blood Triple Lumen BROWN    Culture Results:   No Growth Final      RADIOLOGY:  < from: Xray Chest 1 View- PORTABLE-Routine (09.01.20 @ 03:12) >  PROCEDURE DATE:  09/01/2020            INTERPRETATION:  CLINICAL INFORMATION: Status post cardiac thoracic surgery.    TECHNIQUE: One view of the chest.    COMPARISON: Chest x-ray 8/31/2020.    FINDINGS:    LUNGS: Unchanged bibasilar linear and patchy opacities.    PLEURA: Elevated right hemidiaphragm. No pneumothorax.    HEART AND MEDIASTINUM: Enteric tube terminates in the distal esophagus. Left IJ approach central venous catheterterminates in SVC. Status post median sternotomy. Heart size is poorly assessed.    SKELETON: Unremarkable skeletal structures.      IMPRESSION:    Unchanged bibasilar atelectasis and/or pleural effusions.    < from: VA Duplex Upper Ext Vein Scan, Right (08.31.20 @ 16:18) >  TERPRETATION:  CLINICAL INFORMATION: Right upper extremity swelling, rule out DVT.    COMPARISON: A similar examination on 8/25/2020, showed no DVT.    TECHNIQUE: Duplex sonography of the RIGHT UPPER extremity veins with color and spectral Doppler, with and without compression.    FINDINGS: There is edema within the superficial soft tissues of the right upper extremity.    The right internal jugular, subclavian, axillary and brachial veins are patent and compressible where applicable.    Doppler examination shows normal spontaneous and phasic flow.    The right basilic and cephalic veins were not diagnostically imaged.    IMPRESSION:  No evidence of right upper extremity deep venous thrombosis.    < from: CT Abdomen and Pelvis No Cont (08.30.20 @ 15:06) >  PROCEDURE:  CT of the Abdomen and Pelvis was performed without intravenous contrast.  Intravenous contrast: None.  Oral contrast: None.  Sagittal and coronal reformats were performed.    FINDINGS:  Evaluation ofvascular channel solid organs is limited without intravenous contrast.  Mild motion artifact throughout the upper abdomen.    LOWER CHEST: Trace left and small right pleural effusions. Partially imaged Central venous catheter terminating in the distal SVC.    LIVER: Within normal limits.  BILE DUCTS: Normal caliber.  GALLBLADDER: Cholelithiasis.  SPLEEN: Within normal limits.  PANCREAS: Pancreas is atrophic. No evidence of main pancreatic ductal dilatation.  ADRENALS: Left adrenal adenoma, bettervisualized on prior CT from 8/28/2020.  KIDNEYS/URETERS: No hydronephrosis. A 4.0 cm left renal cyst.    BLADDER: Collapsed around a Rosas catheter.  REPRODUCTIVE ORGANS: Limited in evaluation secondary to streak artifact from left hip arthroplasty.    BOWEL: Enteric tube terminating within the stomach. The stomach is collapsed. No small bowel obstruction. Appendix is not visualized. Wall thickening of the entire colon, not significantly changed since 8/28/2020.  PERITONEUM: Unchanged small volume ascites in the abdomen and pelvis. No pneumoperitoneum.  VESSELS: Normal caliber abdominal aorta. Atherosclerotic changes of the distal abdominal aorta and bilateral common iliac arteries.  RETROPERITONEUM/LYMPH NODES: No lymphadenopathy.  ABDOMINALWALL: Soft tissue edema. Unchanged lipoma in the right lateral thigh. Unchanged right anterior lower abdominal soft tissue scarring.  BONES: Median sternotomy wires. Left hip arthroplasty. Degenerative changes of the spine.    IMPRESSION:  Pancolitisand small volume abdominopelvic ascites, not significantly changed since 8/20/2020.    Trace left and small right pleural effusions.              A/P:      TPN consulted to assist w/ management in pt who will be NPO for a prolonged period of time  [  ] Initiate TPN formula:  Carbohydrates:______grams, Amino Acid:______grams, Lipids:_______ grams  for Protein-Calorie Malnutrition    - Central line w/ designated port for TPN  - Strict Intake and Output.  - Weights three times a week  - Monitor  CMP, Mg, iCa, & Phos daily  - Baseline /Check Triglycerides and monitor daily until TG stable for 3days of Lipids at GOAL    - Baseline Prealbumin, then check weekly  - Glycemic Control/ Hyperglycemia- FS q6H w/ ISS coverage to be ordered by primary team  - Continue monitoring as per Medicine/ Surgery, will follow with you, d/w team    Gena Causey PA-C  (B) [phone]  d/w Ayan Tariq & BECKY Hansen NUTRITION SUPPORT / TPN CONSULT NOTE    HPI:  70y Male w/ NICM s/p HM2 s/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 Patient was treated w/ prednisone and Rituximab. Tacro was discontinued and patient was also transitioned to everolimus  Admitted  6/16-6/19  for hyperglycemia.  Reported to transplant team having one done black tarry stool-  instructed to  present to [facility] for hemolytic anemia. Patient has been following with Hematology outpatient.  Denies CP  N/V diarrhea SOB.   General surgery was consulted when pt developed increased over abdominal distention, Patient with C. Diff. Patient denies N/V Denies difficulty moving bowels. Denies acute changes in size of his abdomen. Denies abdominal pain. Pt subsequently developed ileus.   Currently pt remains in CTU without GI function & (+)NGT for 1week.  Pt currently denies N/V.  Pt had one episode of diarrhea last pm.  no Flatus.  Pt w/o c/o    Papl/ sob/dyspnea/ cp,  No c/o F/C/S. TPN consult requested to assist w/ management of pt's nutrition while being NPO for a prolonged period of time. Discussion with CTU attng & transplant attending regarding risks and benefits with input from Surgery and ID.  Pt's bacteremia has resolved & Colitis is stabilized with Cdiff managed w/ Vanco.   It is believed that pt will be at least one week more before pt's ileus will be completely resolved and pt will be able to take adequate PO nutrition.  Pt remains in CTU where close monitoring is available.   All questions asked and answered to pt's satisfaction.    Current Diet: Diet, NPO:   Except Medications (08-27-20 @ 20:17)      Appetite: [ x ]Poor [  ]Adequate [  ]Good  Caloric intake:  [   ]  Adequate   [ x  ] Inadequate      MEDICATIONS  (STANDING):  chlorhexidine 2% Cloths 1 Application(s) Topical <User Schedule>  cycloSPORINE  (SandIMMUNE) IVPB 15 milliGRAM(s) IV Intermittent every 24 hours  Eravacycline (Xerava) 75 milliGRAM(s),Sodium Chloride 0.9% 150 milliLiter(s) 75 milliGRAM(s) IV Intermittent every 12 hours  heparin   Injectable 5000 Unit(s) SubCutaneous every 8 hours  immune   globulin 10% (GAMMAGARD) IVPB 60 Gram(s) IV Intermittent every 24 hours  insulin lispro (HumaLOG) corrective regimen sliding scale   SubCutaneous every 8 hours  methylPREDNISolone sodium succinate Injectable 16 milliGRAM(s) IV Push <User Schedule>  pantoprazole  Injectable 40 milliGRAM(s) IV Push two times a day  vancomycin    Solution 500 milliGRAM(s) Oral every 6 hours  vancomycin  Retention Enema 500 milliGRAM(s) Rectal every 6 hours    MEDICATIONS  (PRN):  benzocaine 15 mG/menthol 3.6 mG (Sugar-Free) Lozenge 1 Lozenge Oral every 6 hours PRN Sore Throat      PAST MEDICAL & SURGICAL HISTORY:  hemolytic anemia  autoimmune hemolytic anemia  HLD (hyperlipidemia)  DVT of upper extremity (deep vein thrombosis)  Hepatitis C virus  CKD  GIB (gastrointestinal bleeding)  Ventricular fibrillation: s/p AICD  PAF (paroxysmal atrial fibrillation): on xarelto, s/p Ablation Treatment:   Non-Ischemic Cardiomyopathy  SVT (Supraventricular Tachycardia)  HTN  CHF (Congestive Heart Failure)  S/P right heart catheterization: biopsy multiple  s/p heart transplant: 2/2018  S/p left hip replacement  s/p AICD (Automatic Cardioverter/Defibrillator)     FAMILY HISTORY:  No pertinent family history in first degree relatives      Social History: / lives w/ brother; former smoker, Denies current smoking, ETOH, drugs    ALLERGIES  No Known Allergies    ROS: General/ GI see HPI  all other systems negative    VITALS  T(C): 36.4 (09-02-20 @ 07:00), Max: 36.8 (09-01-20 @ 20:00)  HR: 100 (09-02-20 @ 09:12) (100 - 119)  BP: 125/85 (09-02-20 @ 09:12) (106/77 - 137/76)  RR: 13 (09-02-20 @ 09:12) (10 - 45)  SpO2: 97% (09-02-20 @ 09:12) (93% - 99%)  I&O's Detail    01 Sep 2020 07:01  -  02 Sep 2020 07:00  --------------------------------------------------------  IN:    multiple electrolytes Injection Type 1multiple electrolytes Injection Type 1: 1200 mL    Solution: 46.2 mL    Solution: 100 mL    Solution: 750 mL    Solution: 300 mL  Total IN: 2396.2 mL    OUT:    Indwelling Catheter - Urethral: 2155 mL    Nasoenteral Tube: 300 mL  Total OUT: 2455 mL    Total NET: -58.8 mL      02 Sep 2020 07:01  -  02 Sep 2020 09:49  --------------------------------------------------------  IN:    Enteral Tube Flush: 30 mL    Solution: 150 mL    Solution: 6.3 mL  Total IN: 186.3 mL    OUT:    Indwelling Catheter - Urethral: 150 mL  Total OUT: 150 mL    Total NET: 36.3 mL    PHYSICAL EXAM:  GEN:  guarded but stable, WD/WN/WG, (+)NGT  HEENT: NC/AT, PERRL, mucosa moist & throat clear, no trachea midline w/ neck supple  Resp: CTA, well healed scar  CV:  RRR  GI: (-) BS, firm, distended/ nontender (+)tympanitic   MSK:  FROM x4, no deformities  VASCULAR: Equally warm, no cyanosis, clubbing,          BLE edema equal,  R>LUE edema- soft nontender w/o cord or erythema  Neurology; no focal deficits, weakened strength & sensation grossly intact  Psych: normal affect  Skin: warm,  moist, & w/ good turgor      LABS:                        8.7    12.12 )-----------( 153      ( 02 Sep 2020 01:36 )             27.6     09-02    143  |  110<H>  |  84<H>  ----------------------------<  111<H>  3.5   |  23  |  1.95<H>    Ca    7.9<L>      02 Sep 2020 01:36  Phos  3.7     09-02  Mg     3.0     09-02    TPro  7.5  /  Alb  2.1<L>  /  TBili  0.7  /  DBili  x   /  AST  39  /  ALT  20  /  AlkPhos  84  09-02    Ionized Calcium Levels:   Blood Gas Calcium, Ionized - Venous: 1.10 mmoL/L (08.30.20 @ 22:08)      CAPILLARY BLOOD GLUCOSE  POCT Blood Glucose.: 99 mg/dL (02 Sep 2020 06:58)  POCT Blood Glucose.: 113 mg/dL (01 Sep 2020 21:04)  POCT Blood Glucose.: 131 mg/dL (01 Sep 2020 14:11)      A1C with Estimated Average Glucose: 8.2 % (06-16-20 @ 15:40)      Prealbumin, Serum: 24 mg/dL (08.25.20 @ 15:10)    Triglycerides, Serum: 116 mg/dL (09.02.20 @ 01:36)  Triglycerides, Serum: 33 mg/dL (06.07.17 @ 06:14)    Cyclosporine Level: 46: CYCLOSPORINE: Assay performed by Chemiluminescent Microparticle  Immunoassay (CMIA) on the [employer] system.  All immunoassay tests  are subject to rare interferences from heterophile antibodies so that if  atypical or unexpected results are obtained the lab should be notified to  confirm this result by an alternative method before adjusting medication  dosage. The therapeutic range of 150-400 ng/mL is based on trough levels  of Cyclosporine but levels for clinical management will vary depending on  the organ transplanted, post-dose time of draw, length of time  post-transplant and the individual patient's metabolism. ng/mL (09.01.20 @ 09:44)        CULTURES:  Culture - Blood (08.25.20 @ 23:14)    Specimen Source: .Blood Triple Lumen BROWN    Culture Results:   No Growth Final      RADIOLOGY:  < from: Xray Chest 1 View- PORTABLE-Routine (09.01.20 @ 03:12) >  PROCEDURE DATE:  09/01/2020            INTERPRETATION:  CLINICAL INFORMATION: Status post cardiac thoracic surgery.    TECHNIQUE: One view of the chest.    COMPARISON: Chest x-ray 8/31/2020.    FINDINGS:    LUNGS: Unchanged bibasilar linear and patchy opacities.    PLEURA: Elevated right hemidiaphragm. No pneumothorax.    HEART AND MEDIASTINUM: Enteric tube terminates in the distal esophagus. Left IJ approach central venous catheterterminates in SVC. Status post median sternotomy. Heart size is poorly assessed.    SKELETON: Unremarkable skeletal structures.      IMPRESSION:    Unchanged bibasilar atelectasis and/or pleural effusions.    < from: VA Duplex Upper Ext Vein Scan, Right (08.31.20 @ 16:18) >  TERPRETATION:  CLINICAL INFORMATION: Right upper extremity swelling, rule out DVT.    COMPARISON: A similar examination on 8/25/2020, showed no DVT.    TECHNIQUE: Duplex sonography of the RIGHT UPPER extremity veins with color and spectral Doppler, with and without compression.    FINDINGS: There is edema within the superficial soft tissues of the right upper extremity.    The right internal jugular, subclavian, axillary and brachial veins are patent and compressible where applicable.    Doppler examination shows normal spontaneous and phasic flow.    The right basilic and cephalic veins were not diagnostically imaged.    IMPRESSION:  No evidence of right upper extremity deep venous thrombosis.    < from: CT Abdomen and Pelvis No Cont (08.30.20 @ 15:06) >  PROCEDURE:  CT of the Abdomen and Pelvis was performed without intravenous contrast.  Intravenous contrast: None.  Oral contrast: None.  Sagittal and coronal reformats were performed.    FINDINGS:  Evaluation ofvascular channel solid organs is limited without intravenous contrast.  Mild motion artifact throughout the upper abdomen.    LOWER CHEST: Trace left and small right pleural effusions. Partially imaged Central venous catheter terminating in the distal SVC.    LIVER: Within normal limits.  BILE DUCTS: Normal caliber.  GALLBLADDER: Cholelithiasis.  SPLEEN: Within normal limits.  PANCREAS: Pancreas is atrophic. No evidence of main pancreatic ductal dilatation.  ADRENALS: Left adrenal adenoma, bettervisualized on prior CT from 8/28/2020.  KIDNEYS/URETERS: No hydronephrosis. A 4.0 cm left renal cyst.    BLADDER: Collapsed around a Rosas catheter.  REPRODUCTIVE ORGANS: Limited in evaluation secondary to streak artifact from left hip arthroplasty.    BOWEL: Enteric tube terminating within the stomach. The stomach is collapsed. No small bowel obstruction. Appendix is not visualized. Wall thickening of the entire colon, not significantly changed since 8/28/2020.  PERITONEUM: Unchanged small volume ascites in the abdomen and pelvis. No pneumoperitoneum.  VESSELS: Normal caliber abdominal aorta. Atherosclerotic changes of the distal abdominal aorta and bilateral common iliac arteries.  RETROPERITONEUM/LYMPH NODES: No lymphadenopathy.  ABDOMINALWALL: Soft tissue edema. Unchanged lipoma in the right lateral thigh. Unchanged right anterior lower abdominal soft tissue scarring.  BONES: Median sternotomy wires. Left hip arthroplasty. Degenerative changes of the spine.    IMPRESSION:  Pancolitisand small volume abdominopelvic ascites, not significantly changed since 8/20/2020.    Trace left and small right pleural effusions.

## 2020-09-02 NOTE — CONSULT NOTE ADULT - ASSESSMENT
A/P:  70y M with history of heart transplant in 2/2018 admitted with autoimmune hemolytic anemia and dark stools requiring transfusion. Found to have c diff and abdominal distension on 8/23.  Pt developed Ileus w/ NGT placement and remains NPO.    TPN consulted to assist w/ management in pt who will be NPO for a prolonged period of time  Pt w/ Acute Severe Protein-Calorie Malnutrition    Initiate TPN formula today & 1/2 strength  60g Amino Acids, 140g Dextrose, 25g Lipids  GOAL TPN to be considered  135g AA, 280g Dextrose, & 50g Lipids      - Central line w/ designated port for TPN to be placed  - Strict Intake and Output- fluid overload, cardiac patient             Norman resolving  - HyperMg- initiating w/ MgSO4 8mEq  - Concern for Refeeding syndrome -initiate 30mMol NaPhos             Pt w/ NORMAN so initiate w/ KCl 60mEq             continue to monitor  - Weights three times a week  - Monitor  CMP, Mg, iCa, & Phos daily  - Check Triglycerides and monitor daily until TG stable for 3days of Lipids at GOAL    - Baseline Prealbumin, then check weekly  - Pt with h/o Hyperglycemia- HgA1c 7.1             Initiate TPN w/ 5U insulin             FS q6H w/ ISS coverage to be ordered by primary team  - Continue monitoring as per CTU w/ Surgery, will follow with you, d/w CTU team    SHELLY Stafford-C  (B) [phone]  d/w Ayan Tariq & BECKY Hansen

## 2020-09-02 NOTE — PROGRESS NOTE ADULT - PROBLEM SELECTOR PLAN 2
- We will adjust IV cyclosporine as necessary. Currently at 15mg/24hours to reduce overall level of immunosuppression. Everolimus level was 5.3 on 8/31 and has been discontinued.     - Continue current methylprednisone dose (equivalent to 20 mg daily of prednisone).

## 2020-09-02 NOTE — PROGRESS NOTE ADULT - SUBJECTIVE AND OBJECTIVE BOX
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 Patient was treated w/ prednisone and Rituximab. Tacro was discontinued and patient was also transitioned to everolimus  Admitted  6/16-6/19  for hyperglycemia.  Reported to transplant team having one done black tarry stool-  instructed to  present to [facility] for hemolytic anemia. Patient has been following with Hematology outpatient.  Denies CP  N/V diarrhea SOB. (11 Aug 2020 20:08)      Surgery/Hospital course:  8/11 Admitted to [facility] with symptomatic anemia  8/13 EGD for investigation of tarry stools  8/15 SB capsule: stomach & SB lesions, likely ulcers.  8/17 EGD/push enteroscopy w/ angioectasias/erosions in small bowel. Gastropathy and esophagitis. Ulcer seen on VCE most likely scope trauma.    8/18 VSS transferred back to floor.   8/20 VS 9.0/28.4 stable Gastric biopsy results LA Grade A esophagitis.  - Acute gastritis. Biopsies taken to r/o CMV, No ulceration seen despite finding on capsule endoscopy - likely 2/2 scope  trauma that has since resolved. Pathology pending.  8/21 VSS H/H  8.1/25.7  trending down will monitor prednisone taper 30 mg today. Procardia 120 initiated today RHC biopsy Monday.   8/22 VVS; Patient reports loose stools x 2-3 days with 1 episode today.  Stool sent for C-dif and GI PCR. Abdominal X-ray revealed: dilated loops of bowel. Abdomen distended.  Patient denies N/V. GI called to re-evaluate. Continue with current medication regimen.  Awaiting for upcoming cardiac biopsy on Monday 8/24.  8/23   vss    creat up to 2.28 ,  repeating CMP,  TTE ordered  Bladder scan   8/24   cardiac bx cancelled   elev creat  to 2.74   cardio renal cslt called,    + CDIF   inc vanco to 500 q6   ID following  8/25 VSS: RHC today as per transplant team; transfuse 2 units PRBC today as per Dr. Viramontes; continue vanco for cdiff; transfer patient to CTU after cath today   8/26. Dieretic challenge with good response   8/27: Downgraded to the floor then upgraded to the CTU again for concerning of abd distention   8/28 CT ABD & Pelvis reveals pancolitis  8/30: repeat CT scan of abd, gen surgery called to re-evaluated pt.    Today/Overnight:    Vital Signs Last 24 Hrs  T(C): 36.6 (02 Sep 2020 20:00), Max: 36.7 (02 Sep 2020 00:00)  T(F): 97.9 (02 Sep 2020 20:00), Max: 98 (02 Sep 2020 00:00)  8/30: repeat CT scan of abd, gen surgery called to re-evaluated Requires bedside physical therapy, mobilization and total retirement care..BP: 119/84 (02 Sep 2020 20:00) (106/77 - 145/95)  BP(mean): 97 (02 Sep 2020 20:00) (88 - 116)  RR: 33 (02 Sep 2020 20:00) (11 - 36)  SpO2: 99% (02 Sep 2020 20:00) (93% - 100%)  ============================I/O===========================  I&O's Detail    01 Sep 2020 07:01  -  02 Sep 2020 07:00  --------------------------------------------------------  IN:    multiple electrolytes Injection Type 1multiple electrolytes Injection Type 1: 1200 mL    Solution: 46.2 mL    Solution: 100 mL    Solution: 750 mL    Solution: 300 mL  Total IN: 2396.2 mL    OUT:    Indwelling Catheter - Urethral: 2155 mL    Nasoenteral Tube: 300 mL  Total OUT: 2455 mL    Total NET: -58.8 mL      02 Sep 2020 07:01  -  02 Sep 2020 20:27  --------------------------------------------------------  IN:    Enteral Tube Flush: 30 mL    fat emulsion (Fish Oil and Plant Based) 20% Infusion: 68 mL    Solution: 525 mL    Solution: 150 mL    Solution: 27.3 mL  Total IN: 800.3 mL    OUT:    Indwelling Catheter - Urethral: 1470 mL    Nasoenteral Tube: 400 mL  Total OUT: 1870 mL    Total NET: -1069.7 mL    ============================ LABS =========================                        8.7    12.12 )-----------( 153      ( 02 Sep 2020 01:36 )             27.6     09-02    143  |  110<H>  |  84<H>  ----------------------------<  111<H>  3.5   |  23  |  1.95<H>    Ca    7.9<L>      02 Sep 2020 01:36  Phos  3.7     09-02  Mg     3.0     09-02    TPro  7.5  /  Alb  2.1<L>  /  TBili  0.7  /  DBili  x   /  AST  39  /  ALT  20  /  AlkPhos  84  09-02    LIVER FUNCTIONS - ( 02 Sep 2020 01:36 )  Alb: 2.1 g/dL / Pro: 7.5 g/dL / ALK PHOS: 84 U/L / ALT: 20 U/L / AST: 39 U/L / GGT: x           ======================Micro/Rad/Cardio=================  Culture: Reviewed   CXR: Reviewed  Echo: Reviewed  ======================================================  PAST MEDICAL & SURGICAL HISTORY:  H/O hemolytic anemia  H/O autoimmune hemolytic anemia  Knee pain, right  HLD (hyperlipidemia)  Former smoker  DVT of upper extremity (deep vein thrombosis)  Hepatitis C virus  GIB (gastrointestinal bleeding)  Ventricular fibrillation: s/p AICD  PAF (paroxysmal atrial fibrillation): on xarelto  Non-Ischemic Cardiomyopathy  SVT (Supraventricular Tachycardia)  HTN  CHF (Congestive Heart Failure)  S/P right heart catheterization: biopsy multiple  H/O heart transplant: 2/2018  Status post left hip replacement  History of Prior Ablation Treatment: for afib  AICD (Automatic Cardioverter/Defibrillator) Present: [employer] with 1 [employer] lead4/1/09- explanted and replaced with Medtronic 2 leads on 9/2/09    ========================ASSESSMENT ================  Heart transplant 2/2018 for ACC/AHA stage D NICM   Resolved GI Bleed, Melena s/p EGD  Acute respiratory failure, resolved  Supraventricular tachycardia  Severe hypertension  Hyperlactatemia acidosis, resolved  Leukopenia- resolved  Acute blood loss anemia, stable   CKD Stage III  C. diff diarrhea  Klebsiella oxytoca bacteremia  Sepsis  Azotemia 2/2 CKD and Steroids   Pancolitis    Plan:  ====================== NEUROLOGY=====================  Nonfocal, continue to monitor neuro status   Continue PT, out of bed to chair as tolerated    ==================== RESPIRATORY======================  On supplemental O2 therapy via 2L NC  Encourage incentive spirometry, continue pulse ox monitoring, follow ABGs     ====================CARDIOVASCULAR==================  Heart transplant 2/2018 for ACC/AHA stage D NICM, now on solumedrol from prednisone   (NPO for abd distention) off everolimus. Off cellcept while on high dose steroids   Continue hemodynamic monitoring   ASA on hold for hx of GI bleed / ulceration     methylPREDNISolone sodium succinate Injectable 16 milliGRAM(s) IV Push <User Schedule>    ===================HEMATOLOGIC/ONC ===================  Anemia s/p multiple blood transfusions  Monitor H&H and transfuse prn   GI Bleeding ruled out s/p EGD    VTE prophylaxis, heparin   Injectable 5000 Unit(s) SubCutaneous every 8 hours    ===================== RENAL =========================  NORMAN on CKD stage 3, Anasarca. Lasix PRN  Continue to monitor I/Os, BUN/Cr, and urine output.   Monitor and replete electrolytes prn     ==================== GASTROINTESTINAL===================  NPO in setting of abdominal distention   CT A/P on 8/30 with Pancolitis and small volume abdominopelvic ascites, not significantly changed since 8/20/2020.  General surgery following-continue serial abdominal exams.     fat emulsion (Fish Oil and Plant Based) 20% Infusion 10.4 mL/Hr (10.4 mL/Hr) IV Continuous <Continuous>  GI prophylaxis, pantoprazole  Injectable 40 milliGRAM(s) IV Push two times a day  Parenteral Nutrition - Adult 1 Each (58 mL/Hr) TPN Continuous <Continuous>    =======================    ENDOCRINE  =====================  Glucose control with Humalog sliding scale for stress hyperglycemia     insulin lispro (HumaLOG) corrective regimen sliding scale   SubCutaneous every 8 hours    ========================INFECTIOUS DISEASE================  Klebsiella bacteremia from 8/13 which has since cleared  Clostridium difficile PCR positive, continue with vancomycin PO   Started on Rectal Vanco for worsening Cdiff presentation as per ID    Transplant prophylaxis with Posaconazole and Atovaquone on hold d/t ileus  Afebrile, WBC uptrending 8 -> 12    vancomycin    Solution 500 milliGRAM(s) Oral every 6 hours  vancomycin  Retention Enema 500 milliGRAM(s) Rectal every 6 hours      Patient requires continuous monitoring with bedside rhythm monitoring, pulse oximetry monitoring, and continuous central venous and arterial pressure monitoring; and intermittent blood gas analysis.  Care plan discussed with ICU care team. Patient remained critical, at risk for life threatening decompensation.       By signing my name below, ITonny, attest that this documentation has been prepared under the direction and in the presence of Deyvi Frye NP.  Electronically signed: Katty Trejo, 09-02-20 @ 20:27    I, Deyvi Frye NP, personally performed the services described in this documentation. All medical record entries made by the ramuibkurt were at my direction and in my presence. I have reviewed the chart and agree that the record reflects my personal performance and is accurate and complete  Electronically signed: Deyvi Frye NP, 09-02-20 @ 20:27

## 2020-09-02 NOTE — CHART NOTE - NSCHARTNOTEFT_GEN_A_CORE
Brief IR Chart Note.    Approved for bedside PICC placement pending documented ID clearance.     Discussed with Dr. Kohli.

## 2020-09-02 NOTE — PROGRESS NOTE ADULT - SUBJECTIVE AND OBJECTIVE BOX
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 Patient was treated w/ prednisone and Rituximab. Tacro was discontinued and patient was also transitioned to everolimus  Admitted  6/16-6/19  for hyperglycemia.  Reported to transplant team having one done black tarry stool-  instructed to  present to [facility] for hemolytic anemia. Patient has been following with Hematology outpatient.  Denies CP  N/V diarrhea SOB. (11 Aug 2020 20:08)      Surgery/Hospital Course:  8/11 Admitted to [facility] with symptomatic anemia  8/13 EGD for investigation of tarry stools  8/15 SB capsule: stomach & SB lesions, likely ulcers.  8/17 EGD/push enteroscopy w/ angioectasias/erosions in small bowel. Gastropathy and esophagitis. Ulcer seen on VCE most likely scope trauma.    8/18 VSS transferred back to floor.   8/20 VS 9.0/28.4 stable Gastric biopsy results LA Grade A esophagitis.  - Acute gastritis. Biopsies taken to r/o CMV, No ulceration seen despite finding on capsule endoscopy - likely 2/2 scope  trauma that has since resolved. Pathology pending.  8/21 VSS H/H  8.1/25.7  trending down will monitor prednisone taper 30 mg today. Procardia 120 initiated today RHC biopsy Monday.   8/22 VVS; Patient reports loose stools x 2-3 days with 1 episode today.  Stool sent for C-dif and GI PCR. Abdominal X-ray revealed: dilated loops of bowel. Abdomen distended.  Patient denies N/V. GI called to re-evaluate. Continue with current medication regimen.  Awaiting for upcoming cardiac biopsy on Monday 8/24.  8/23   vss    creat up to 2.28 ,  repeating CMP,  TTE ordered  Bladder scan   8/24   cardiac bx cancelled   elev creat  to 2.74   cardio renal cslt called,    + CDIF   inc vanco to 500 q6   ID following  8/25 VSS: RHC today as per transplant team; transfuse 2 units PRBC today as per Dr. Viramontes; continue vanco for cdiff; transfer patient to CTU after cath today   8/26. Dieretic challenge with good response   8/27: Downgraded to the floor then upgraded to the CTU again for concerning of abd distention   8/28 CT ABD & Pelvis reveals pancolitis  8/30: repeat CT scan of abd, gen surgery called to re-evaluated pt.      Today:      REVIEW OF SYSTEMS:  Gen: No fever  EYES/ENT: No visual changes;  No vertigo or throat pain   NECK: No pain   RES:  No shortness of breath or Cough  CARD: No chest pain   GI: No abdominal pain  : No dysuria  NEURO: No weakness  SKIN: No itching, rashes     ICU Vital Signs Last 24 Hrs  T(C): 36.1 (02 Sep 2020 04:00), Max: 36.8 (01 Sep 2020 20:00)  T(F): 97 (02 Sep 2020 04:00), Max: 98.2 (01 Sep 2020 20:00)  HR: 100 (02 Sep 2020 09:12) (100 - 110)  BP: 125/85 (02 Sep 2020 09:12) (106/77 - 137/76)  BP(mean): 100 (02 Sep 2020 08:00) (88 - 108)  ABP: --  ABP(mean): --  RR: 13 (02 Sep 2020 09:12) (10 - 45)  SpO2: 97% (02 Sep 2020 09:12) (93% - 99%)      Physical Exam:  Gen:  Awake, alert   CNS: non focal 	  Neck: no JVD  RES : clear , no wheezing              CVS: Sinus Tachycardia. Normal S1/S2  Abd: firm, non-tender, distended. Bowel sounds absent. +NGT to LWS  Skin: No rash.  Ext:  +1 pedal edema    ============================I/O===========================   I&O's Detail    01 Sep 2020 07:01  -  02 Sep 2020 07:00  --------------------------------------------------------  IN:    multiple electrolytes Injection Type 1multiple electrolytes Injection Type 1: 1200 mL    Solution: 46.2 mL    Solution: 100 mL    Solution: 750 mL    Solution: 300 mL  Total IN: 2396.2 mL    OUT:    Indwelling Catheter - Urethral: 2155 mL    Nasoenteral Tube: 300 mL  Total OUT: 2455 mL    Total NET: -58.8 mL      02 Sep 2020 07:01  -  02 Sep 2020 09:18  --------------------------------------------------------  IN:    Enteral Tube Flush: 30 mL    Solution: 75 mL    Solution: 2.1 mL  Total IN: 107.1 mL    OUT:    Indwelling Catheter - Urethral: 75 mL  Total OUT: 75 mL    Total NET: 32.1 mL        ============================ LABS =========================                        8.7    12.12 )-----------( 153      ( 02 Sep 2020 01:36 )             27.6     09-02    143  |  110<H>  |  84<H>  ----------------------------<  111<H>  3.5   |  23  |  1.95<H>    Ca    7.9<L>      02 Sep 2020 01:36  Phos  3.7     09-02  Mg     3.0     09-02    TPro  7.5  /  Alb  2.1<L>  /  TBili  0.7  /  DBili  x   /  AST  39  /  ALT  20  /  AlkPhos  84  09-02    LIVER FUNCTIONS - ( 02 Sep 2020 01:36 )  Alb: 2.1 g/dL / Pro: 7.5 g/dL / ALK PHOS: 84 U/L / ALT: 20 U/L / AST: 39 U/L / GGT: x                 ======================Micro/Rad/Cardio=================  Culture: Reviewed   CXR: Reviewed  Echo:Reviewed  ======================================================  PAST MEDICAL & SURGICAL HISTORY:  H/O hemolytic anemia  H/O autoimmune hemolytic anemia  Knee pain, right  HLD (hyperlipidemia)  Former smoker  DVT of upper extremity (deep vein thrombosis)  Hepatitis C virus  GIB (gastrointestinal bleeding)  Ventricular fibrillation: s/p AICD  PAF (paroxysmal atrial fibrillation): on xarelto  Non-Ischemic Cardiomyopathy  SVT (Supraventricular Tachycardia)  HTN  CHF (Congestive Heart Failure)  S/P right heart catheterization: biopsy multiple  H/O heart transplant: 2/2018  Status post left hip replacement  History of Prior Ablation Treatment: for afib  AICD (Automatic Cardioverter/Defibrillator) Present: [employer] with 1 [employer] lead4/1/09- explanted and replaced with Medtronic 2 leads on 9/2/09    ====================ASSESSMENT ==============  Heart transplant 2/2018 for ACC/AHA stage D NICM   Resolved GI Bleed, Melena s/p EGD  Acute respiratory failure, resolved  Supraventricular tachycardia  Severe hypertension  Hyperlactatemia acidosis, resolved  Leukopenia- resolved  Acute blood loss anemia, stable   CKD Stage III  C. diff diarrhea  Klebsiella oxytoca bacteremia  Sepsis  Azotemia 2/2 CKD and Steroids   Pancolitis    Plan:  ====================== NEUROLOGY=====================  Nonfocal, continue to monitor neuro status   Continue PT, out of bed to chair as tolerated     ==================== RESPIRATORY======================  Supplemental O2 via NC   Encourage incentive spirometry, continue pulse ox monitoring, follow ABGs     ====================CARDIOVASCULAR==================  Heart transplant 2/2018 for ACC/AHA stage D NICM, now on solumedrol from prednisone   (NPO for abd distention) off everolimus. off cellcept while on high dose steroids   Continue hemodynamic monitoring   ASA on hold for hx of GI bleed/ ulceration     methylPREDNISolone sodium succinate Injectable 16 milliGRAM(s) IV Push <User Schedule>    ===================HEMATOLOGIC/ONC ===================  Anemia s/p multiple blood transfusions  Monitor H&H and transfuse prn   Hx of GI bleed     VTE prophylaxis, heparin   Injectable 5000 Unit(s) SubCutaneous every 8 hours    ===================== RENAL =========================  NORMAN on CKD stage 3, Anasarca. lasix prn   Continue monitoring urine output via diaz, I&Os, BUN/Cr  Monitor and replete electrolytes prn     ==================== GASTROINTESTINAL===================  NPO in setting of abdominal distention   CT A/P on 8/30 with Pancolitis and small volume abdominopelvic ascites, not significantly changed since 8/20/2020.  General surgery following-continue serial abdominal exams.     GI prophylaxis, pantoprazole  Injectable 40 milliGRAM(s) IV Push two times a day    =======================    ENDOCRINE  =====================  Glucose control with humalog sliding scale for stress hyperglycemia     insulin lispro (HumaLOG) corrective regimen sliding scale   SubCutaneous every 8 hours    ========================INFECTIOUS DISEASE================  Klebsiella bacteremia from 8/13 which has since cleared  Clostridium difficile PCR positive, continue with vancomycin PO   Started on Rectal Vanco for worsening Cdiff presentation as per ID    Transplant prophylaxis with Posaconazole and Atovaquone on hold d/t ileus  Monitor Temp and WBC    vancomycin    Solution 500 milliGRAM(s) Oral every 6 hours  vancomycin  Retention Enema 500 milliGRAM(s) Rectal every 6 hours      Patient requires continuous monitoring with bedside rhythm monitoring, pulse ox monitoring, and intermittent blood gas analysis. Care plan discussed with ICU care team. Patient remained critical and at risk for life threatening decompensation.     By signing my name below, IRonit, attest that this documentation has been prepared under the direction and in the presence of SHELLY Romero   Electronically signed: Ab Gaffney, 09-02-20 @ 09:18    I, Jeana Lemons, personally performed the services described in this documentation. all medical record entries made by the ab were at my direction and in my presence. I have reviewed the chart and agree that the record reflects my personal performance and is accurate and complete  Electronically signed: SHELLY Romero

## 2020-09-02 NOTE — PROGRESS NOTE ADULT - SUBJECTIVE AND OBJECTIVE BOX
SURGERY DAILY PROGRESS NOTE:      S:   Patient seen and examined. No acute events overnight. Abdominal distension noted to be improving.  Pain is well controlled. GI fxn +/+ (1 stool overnight). Tolerating diet w/o N/V      O:   Exam:  Gen: NAD. A&Ox3.  Well developed, alert and cooperative.   Resp: No additional work of breathing.   Card: RR. No peripheral edema or pallor.   Abd: Soft, improved distension. Non-tender  Ext: WWP. Able to move all extremities equally.    Vital Signs Last 24 Hrs  T(C): 36.1 (02 Sep 2020 04:00), Max: 36.8 (01 Sep 2020 20:00)  T(F): 97 (02 Sep 2020 04:00), Max: 98.2 (01 Sep 2020 20:00)  HR: 105 (02 Sep 2020 04:00) (100 - 115)  BP: 120/82 (02 Sep 2020 04:00) (110/77 - 137/76)  BP(mean): 95 (02 Sep 2020 04:00) (90 - 108)  RR: 26 (02 Sep 2020 04:00) (10 - 45)  SpO2: 94% (02 Sep 2020 04:00) (94% - 100%)      08-31-20 @ 07:01  -  09-01-20 @ 07:00  --------------------------------------------------------  IN: 4236.4 mL / OUT: 1990 mL / NET: 2246.4 mL    09-01-20 @ 07:01  -  09-02-20 @ 06:50  --------------------------------------------------------  IN: 2164.9 mL / OUT: 1995 mL / NET: 169.9 mL        LABS:                        8.7    12.12 )-----------( 153      ( 02 Sep 2020 01:36 )             27.6     09-02    143  |  110<H>  |  84<H>  ----------------------------<  111<H>  3.5   |  23  |  1.95<H>    Ca    7.9<L>      02 Sep 2020 01:36  Phos  3.7     09-02  Mg     3.0     09-02    TPro  7.5  /  Alb  2.1<L>  /  TBili  0.7  /  DBili  x   /  AST  39  /  ALT  20  /  AlkPhos  84  09-02

## 2020-09-02 NOTE — PROGRESS NOTE ADULT - SUBJECTIVE AND OBJECTIVE BOX
ID Follow Up      Interval History/ROS:Patient is a 70y old  Male who presents with a chief complaint of SOB/anemia (02 Sep 2020 11:53)  Reports feeling better than yesterday. 1 BM overnight and in AM with loose consistency per RN. Denies fevers, chills, nausea, vomiting. NGT in place. IV Flagyll d/c'd yesterday.     Allergies  No Known Allergies    Intolerances  None      ANTIMICROBIALS:  vancomycin    Solution 500 every 6 hours  vancomycin  Retention Enema 500 every 6 hours      OTHER MEDS:  benzocaine 15 mG/menthol 3.6 mG (Sugar-Free) Lozenge 1 Lozenge Oral every 6 hours PRN  chlorhexidine 2% Cloths 1 Application(s) Topical <User Schedule>  cycloSPORINE  (SandIMMUNE) IVPB 15 milliGRAM(s) IV Intermittent every 24 hours  Eravacycline (Xerava) 75 milliGRAM(s),Sodium Chloride 0.9% 150 milliLiter(s) 75 milliGRAM(s) IV Intermittent every 12 hours  fat emulsion (Fish Oil and Plant Based) 20% Infusion 10.4 mL/Hr IV Continuous <Continuous>  heparin   Injectable 5000 Unit(s) SubCutaneous every 8 hours  immune   globulin 10% (GAMMAGARD) IVPB 60 Gram(s) IV Intermittent every 24 hours  insulin lispro (HumaLOG) corrective regimen sliding scale   SubCutaneous every 8 hours  methylPREDNISolone sodium succinate Injectable 16 milliGRAM(s) IV Push <User Schedule>  pantoprazole  Injectable 40 milliGRAM(s) IV Push two times a day  Parenteral Nutrition - Adult 1 Each TPN Continuous <Continuous>      Vital Signs Last 24 Hrs  T(C): 36.6 (02 Sep 2020 11:00), Max: 36.8 (01 Sep 2020 20:00)  T(F): 97.9 (02 Sep 2020 11:00), Max: 98.2 (01 Sep 2020 20:00)  HR: 100 (02 Sep 2020 12:00) (99 - 119)  BP: 132/89 (02 Sep 2020 12:00) (106/77 - 136/91)  BP(mean): 106 (02 Sep 2020 12:00) (88 - 108)  RR: 36 (02 Sep 2020 12:00) (11 - 45)  SpO2: 97% (02 Sep 2020 12:00) (93% - 100%)    EXAM:  Constitutional: Not in acute distress  Eyes: No icterus. Pupils b/l reactive to light.  Oral cavity: Clear, no lesions  RS: diminished breath sounds bilaterally. No wheeze/rhonchi/crepitations.  CVS: S1, S2 heard. Regular rate and rhythm. No murmurs/rubs/gallops.  Abdomen: distended, no bowel sounds heard. No guarding or tenderness.  : No acute abnormalities  Extremities: Warm. UE edema R>L +2 pitting edema. LE b/l +2 pitting edema.  Skin: No lesions noted  Vascular: No evidence of phlebitis  Neuro: Alert, oriented to time/place/person    Labs:                        8.7    12.12 )-----------( 153      ( 02 Sep 2020 01:36 )             27.6       09-02    143  |  110<H>  |  84<H>  ----------------------------<  111<H>  3.5   |  23  |  1.95<H>    Ca    7.9<L>      02 Sep 2020 01:36  Phos  3.7     09-02  Mg     3.0     09-02    TPro  7.5  /  Alb  2.1<L>  /  TBili  0.7  /  DBili  x   /  AST  39  /  ALT  20  /  AlkPhos  84  09-02          MICROBIOLOGY:    RADIOLOGY:  No New studies

## 2020-09-02 NOTE — PROGRESS NOTE ADULT - ASSESSMENT
Assessment:   70y M with history of heart transplant in 2/2018 admitted with autoimmune hemolytic anemia and dark stools requiring transfusion. Found to have c diff and abdominal distension on 8/23.    Plan:   - Serial abdominal exams, distension improving at this time and patient with fewer BM per day  - no acute surgical intervention indicated at this time  - agree with abx therapy  - f/u WBC, trending upward  - remainder of care per CTICU  - Will continue to follow    Angie Leigh, PGY-2  [facility]  [id number]

## 2020-09-02 NOTE — PROGRESS NOTE ADULT - SUBJECTIVE AND OBJECTIVE BOX
[facility] DIVISION OF KIDNEY DISEASES AND HYPERTENSION -- FOLLOW UP NOTE  --------------------------------------------------------------------------------  If any questions, please feel free to contact me  NS pager: [phone], [facility]: [phone]  Drew Vale M.D.  Nephrology Fellow    (After 5 pm or on weekends please page the on-call fellow)  --------------------------------------------------------------------------------    Chief Complaint:  Patient is a 70y old  Male who presents with a chief complaint of SOB/anemia (02 Sep 2020 09:39)    24 hour events/subjective:        PAST HISTORY  --------------------------------------------------------------------------------  No significant changes to PMH, PSH, FHx, SHx, unless otherwise noted    ALLERGIES & MEDICATIONS  --------------------------------------------------------------------------------  Allergies    No Known Allergies    Intolerances      Standing Inpatient Medications  chlorhexidine 2% Cloths 1 Application(s) Topical <User Schedule>  cycloSPORINE  (SandIMMUNE) IVPB 15 milliGRAM(s) IV Intermittent every 24 hours  Eravacycline (Xerava) 75 milliGRAM(s),Sodium Chloride 0.9% 150 milliLiter(s) 75 milliGRAM(s) IV Intermittent every 12 hours  heparin   Injectable 5000 Unit(s) SubCutaneous every 8 hours  immune   globulin 10% (GAMMAGARD) IVPB 60 Gram(s) IV Intermittent every 24 hours  insulin lispro (HumaLOG) corrective regimen sliding scale   SubCutaneous every 8 hours  methylPREDNISolone sodium succinate Injectable 16 milliGRAM(s) IV Push <User Schedule>  pantoprazole  Injectable 40 milliGRAM(s) IV Push two times a day  vancomycin    Solution 500 milliGRAM(s) Oral every 6 hours  vancomycin  Retention Enema 500 milliGRAM(s) Rectal every 6 hours    PRN Inpatient Medications  benzocaine 15 mG/menthol 3.6 mG (Sugar-Free) Lozenge 1 Lozenge Oral every 6 hours PRN      REVIEW OF SYSTEMS  --------------------------------------------------------------------------------  Gen: No fevers/chills  Skin: No rashes  Head/Eyes/Ears: Normal hearing,   Respiratory: No dyspnea, cough  CV: No chest pain  GI: No abdominal pain, diarrhea  : No dysuria, hematuria  MSK: No  edema  Heme: No easy bruising or bleeding  Psych: No significant depression      All other systems were reviewed and are negative, except as noted.    VITALS/PHYSICAL EXAM  --------------------------------------------------------------------------------  T(C): 36.6 (09-02-20 @ 11:00), Max: 36.8 (09-01-20 @ 20:00)  HR: 100 (09-02-20 @ 11:00) (99 - 119)  BP: 134/90 (09-02-20 @ 11:00) (106/77 - 137/76)  RR: 19 (09-02-20 @ 11:00) (10 - 45)  SpO2: 100% (09-02-20 @ 11:00) (93% - 100%)  Wt(kg): --        09-01-20 @ 07:01  -  09-02-20 @ 07:00  --------------------------------------------------------  IN: 2396.2 mL / OUT: 2455 mL / NET: -58.8 mL    09-02-20 @ 07:01  -  09-02-20 @ 11:53  --------------------------------------------------------  IN: 338.4 mL / OUT: 260 mL / NET: 78.4 mL        Physical Exam:  	Gen: NAD on nasal cannula  	HEENT: NGT in place  	Pulm: CTA B/l anteriorly  	CV: Tachycardic   	Abd: +BS, soft, NTND       	: + diaz catheter in place w/ urine  	MSK: Warm, bilateral lower ext edema  	Neuro: No focal deficits, AOX3, no asterixis   	Psych: Appropriate Affect  	Vascular Access: None    LABS/STUDIES  --------------------------------------------------------------------------------              8.7    12.12 >-----------<  153      [09-02-20 @ 01:36]              27.6     143  |  110  |  84  ----------------------------<  111      [09-02-20 @ 01:36]  3.5   |  23  |  1.95        Ca     7.9     [09-02-20 @ 01:36]      Mg     3.0     [09-02-20 @ 01:36]      Phos  3.7     [09-02-20 @ 01:36]    TPro  7.5  /  Alb  2.1  /  TBili  0.7  /  DBili  x   /  AST  39  /  ALT  20  /  AlkPhos  84  [09-02-20 @ 01:36]          Creatinine Trend:  SCr 1.95 [09-02 @ 01:36]  SCr 2.24 [09-01 @ 02:11]  SCr 2.70 [08-30 @ 22:12]  SCr 3.10 [08-30 @ 16:13]  SCr 3.17 [08-30 @ 01:19]    Urinalysis - [08-24-20 @ 21:27]      Color Yellow / Appearance Slightly Turbid / SG 1.018 / pH 5.5      Gluc Negative / Ketone Negative  / Bili Negative / Urobili <2 mg/dL       Blood Negative / Protein 30 mg/dL / Leuk Est Negative / Nitrite Negative      RBC 1 / WBC 3 / Hyaline 2 / Gran  / Sq Epi  / Non Sq Epi 2 / Bacteria Negative      Iron 36, TIBC 218, %sat 16      [08-15-20 @ 21:08]  Ferritin 764      [08-15-20 @ 21:12]  Vitamin D (25OH) 33.5      [04-30-20 @ 09:06]  HbA1c 4.7      [11-07-18 @ 23:18]  TSH 1.22      [08-12-20 @ 00:18]  Lipid: chol --, , HDL --, LDL --      [09-02-20 @ 01:36]

## 2020-09-02 NOTE — PROGRESS NOTE ADULT - ATTENDING COMMENTS
Patient seen and examined with Dr. Infante    I agree with his interval history exam and plans as noted above    His abdominal exam remains with distension despite the NG tube  He has sever c.diff colitis in an immunocompromised patient and despite a week NPO with a NG tube has made little improvement but also without clinical deterioration.    Agree with plans to begin TPN    There is no contra indication to placing a PICC line to begin TPN    Gary Lujan MD  [phone]  After 5pm/weekends [phone]

## 2020-09-02 NOTE — PROGRESS NOTE ADULT - PROBLEM SELECTOR PLAN 1
Will continue medications per ID guidance. Fecal transplant not currently an option.  He will have ongoing serial abdominal evaluations per surgery.  He is leukocytotic this morning. We will monitor closely.

## 2020-09-03 LAB
ALBUMIN SERPL ELPH-MCNC: 1.5 G/DL — LOW (ref 3.3–5)
ALBUMIN SERPL ELPH-MCNC: 1.9 G/DL — LOW (ref 3.3–5)
ALP SERPL-CCNC: 83 U/L — SIGNIFICANT CHANGE UP (ref 40–120)
ALP SERPL-CCNC: 92 U/L — SIGNIFICANT CHANGE UP (ref 40–120)
ALT FLD-CCNC: 24 U/L — SIGNIFICANT CHANGE UP (ref 10–45)
ALT FLD-CCNC: 34 U/L — SIGNIFICANT CHANGE UP (ref 10–45)
ANION GAP SERPL CALC-SCNC: 10 MMOL/L — SIGNIFICANT CHANGE UP (ref 5–17)
ANION GAP SERPL CALC-SCNC: 12 MMOL/L — SIGNIFICANT CHANGE UP (ref 5–17)
APTT BLD: 36.7 SEC — HIGH (ref 27.5–35.5)
AST SERPL-CCNC: 51 U/L — HIGH (ref 10–40)
AST SERPL-CCNC: 55 U/L — HIGH (ref 10–40)
BILIRUB DIRECT SERPL-MCNC: 0.4 MG/DL — HIGH (ref 0–0.2)
BILIRUB INDIRECT FLD-MCNC: 0.6 MG/DL — SIGNIFICANT CHANGE UP (ref 0.2–1)
BILIRUB SERPL-MCNC: 0.9 MG/DL — SIGNIFICANT CHANGE UP (ref 0.2–1.2)
BILIRUB SERPL-MCNC: 1 MG/DL — SIGNIFICANT CHANGE UP (ref 0.2–1.2)
BUN SERPL-MCNC: 87 MG/DL — HIGH (ref 7–23)
BUN SERPL-MCNC: 93 MG/DL — HIGH (ref 7–23)
CALCIUM SERPL-MCNC: 8.2 MG/DL — LOW (ref 8.4–10.5)
CALCIUM SERPL-MCNC: 8.3 MG/DL — LOW (ref 8.4–10.5)
CHLORIDE SERPL-SCNC: 108 MMOL/L — SIGNIFICANT CHANGE UP (ref 96–108)
CHLORIDE SERPL-SCNC: 112 MMOL/L — HIGH (ref 96–108)
CO2 SERPL-SCNC: 20 MMOL/L — LOW (ref 22–31)
CO2 SERPL-SCNC: 22 MMOL/L — SIGNIFICANT CHANGE UP (ref 22–31)
CREAT SERPL-MCNC: 1.71 MG/DL — HIGH (ref 0.5–1.3)
CREAT SERPL-MCNC: 1.72 MG/DL — HIGH (ref 0.5–1.3)
CYCLOSPORINE SER-MCNC: 76 NG/ML — LOW (ref 150–400)
GLUCOSE BLDC GLUCOMTR-MCNC: 189 MG/DL — HIGH (ref 70–99)
GLUCOSE BLDC GLUCOMTR-MCNC: 217 MG/DL — HIGH (ref 70–99)
GLUCOSE BLDC GLUCOMTR-MCNC: 225 MG/DL — HIGH (ref 70–99)
GLUCOSE BLDC GLUCOMTR-MCNC: 517 MG/DL — CRITICAL HIGH (ref 70–99)
GLUCOSE SERPL-MCNC: 183 MG/DL — HIGH (ref 70–99)
GLUCOSE SERPL-MCNC: 549 MG/DL — CRITICAL HIGH (ref 70–99)
HCT VFR BLD CALC: 25.8 % — LOW (ref 39–50)
HGB BLD-MCNC: 8.4 G/DL — LOW (ref 13–17)
INR BLD: 1.22 RATIO — HIGH (ref 0.88–1.16)
LDH SERPL L TO P-CCNC: 745 U/L — HIGH (ref 50–242)
MAGNESIUM SERPL-MCNC: 2.5 MG/DL — SIGNIFICANT CHANGE UP (ref 1.6–2.6)
MAGNESIUM SERPL-MCNC: 2.7 MG/DL — HIGH (ref 1.6–2.6)
MCHC RBC-ENTMCNC: 31.2 PG — SIGNIFICANT CHANGE UP (ref 27–34)
MCHC RBC-ENTMCNC: 32.6 GM/DL — SIGNIFICANT CHANGE UP (ref 32–36)
MCV RBC AUTO: 95.9 FL — SIGNIFICANT CHANGE UP (ref 80–100)
NRBC # BLD: 2 /100 WBCS — HIGH (ref 0–0)
PHOSPHATE SERPL-MCNC: 3.4 MG/DL — SIGNIFICANT CHANGE UP (ref 2.5–4.5)
PHOSPHATE SERPL-MCNC: 6.5 MG/DL — HIGH (ref 2.5–4.5)
PLATELET # BLD AUTO: 170 K/UL — SIGNIFICANT CHANGE UP (ref 150–400)
POTASSIUM SERPL-MCNC: 3.8 MMOL/L — SIGNIFICANT CHANGE UP (ref 3.5–5.3)
POTASSIUM SERPL-MCNC: 5.6 MMOL/L — HIGH (ref 3.5–5.3)
POTASSIUM SERPL-SCNC: 3.8 MMOL/L — SIGNIFICANT CHANGE UP (ref 3.5–5.3)
POTASSIUM SERPL-SCNC: 5.6 MMOL/L — HIGH (ref 3.5–5.3)
PROT SERPL-MCNC: 7.4 G/DL — SIGNIFICANT CHANGE UP (ref 6–8.3)
PROT SERPL-MCNC: 8 G/DL — SIGNIFICANT CHANGE UP (ref 6–8.3)
PROTHROM AB SERPL-ACNC: 14.4 SEC — HIGH (ref 10.6–13.6)
RBC # BLD: 2.69 M/UL — LOW (ref 4.2–5.8)
RBC # FLD: 20.4 % — HIGH (ref 10.3–14.5)
SODIUM SERPL-SCNC: 140 MMOL/L — SIGNIFICANT CHANGE UP (ref 135–145)
SODIUM SERPL-SCNC: 144 MMOL/L — SIGNIFICANT CHANGE UP (ref 135–145)
TRIGL SERPL-MCNC: 358 MG/DL — HIGH (ref 10–149)
WBC # BLD: 17.45 K/UL — HIGH (ref 3.8–10.5)
WBC # FLD AUTO: 17.45 K/UL — HIGH (ref 3.8–10.5)

## 2020-09-03 PROCEDURE — 99291 CRITICAL CARE FIRST HOUR: CPT

## 2020-09-03 PROCEDURE — ZZZZZ: CPT

## 2020-09-03 PROCEDURE — 99233 SBSQ HOSP IP/OBS HIGH 50: CPT

## 2020-09-03 PROCEDURE — 99232 SBSQ HOSP IP/OBS MODERATE 35: CPT | Mod: GC

## 2020-09-03 PROCEDURE — 71045 X-RAY EXAM CHEST 1 VIEW: CPT | Mod: 26,76

## 2020-09-03 PROCEDURE — 36556 INSERT NON-TUNNEL CV CATH: CPT

## 2020-09-03 PROCEDURE — 99291 CRITICAL CARE FIRST HOUR: CPT | Mod: 25

## 2020-09-03 RX ORDER — INSULIN LISPRO 100/ML
VIAL (ML) SUBCUTANEOUS EVERY 6 HOURS
Refills: 0 | Status: DISCONTINUED | OUTPATIENT
Start: 2020-09-03 | End: 2020-09-04

## 2020-09-03 RX ORDER — INSULIN LISPRO 100/ML
2 VIAL (ML) SUBCUTANEOUS ONCE
Refills: 0 | Status: COMPLETED | OUTPATIENT
Start: 2020-09-03 | End: 2020-09-03

## 2020-09-03 RX ORDER — ELECTROLYTE SOLUTION,INJ
1 VIAL (ML) INTRAVENOUS
Refills: 0 | Status: DISCONTINUED | OUTPATIENT
Start: 2020-09-03 | End: 2020-09-04

## 2020-09-03 RX ORDER — CYCLOSPORINE 100 MG/1
10 CAPSULE ORAL EVERY 24 HOURS
Refills: 0 | Status: DISCONTINUED | OUTPATIENT
Start: 2020-09-03 | End: 2020-09-06

## 2020-09-03 RX ORDER — FUROSEMIDE 40 MG
40 TABLET ORAL ONCE
Refills: 0 | Status: COMPLETED | OUTPATIENT
Start: 2020-09-03 | End: 2020-09-03

## 2020-09-03 RX ORDER — I.V. FAT EMULSION 20 G/100ML
20.8 EMULSION INTRAVENOUS
Qty: 50 | Refills: 0 | Status: DISCONTINUED | OUTPATIENT
Start: 2020-09-03 | End: 2020-09-04

## 2020-09-03 RX ADMIN — Medication 1 EACH: at 17:14

## 2020-09-03 RX ADMIN — Medication 500 MILLIGRAM(S): at 05:19

## 2020-09-03 RX ADMIN — HEPARIN SODIUM 5000 UNIT(S): 5000 INJECTION INTRAVENOUS; SUBCUTANEOUS at 13:16

## 2020-09-03 RX ADMIN — Medication 40 MILLIGRAM(S): at 10:46

## 2020-09-03 RX ADMIN — PANTOPRAZOLE SODIUM 40 MILLIGRAM(S): 20 TABLET, DELAYED RELEASE ORAL at 05:19

## 2020-09-03 RX ADMIN — Medication 1 EACH: at 20:00

## 2020-09-03 RX ADMIN — Medication 500 MILLIGRAM(S): at 07:24

## 2020-09-03 RX ADMIN — Medication 500 MILLIGRAM(S): at 13:16

## 2020-09-03 RX ADMIN — PANTOPRAZOLE SODIUM 40 MILLIGRAM(S): 20 TABLET, DELAYED RELEASE ORAL at 16:38

## 2020-09-03 RX ADMIN — HEPARIN SODIUM 5000 UNIT(S): 5000 INJECTION INTRAVENOUS; SUBCUTANEOUS at 22:51

## 2020-09-03 RX ADMIN — I.V. FAT EMULSION 20.8 ML/HR: 20 EMULSION INTRAVENOUS at 17:15

## 2020-09-03 RX ADMIN — Medication 500 MILLIGRAM(S): at 13:40

## 2020-09-03 RX ADMIN — Medication 4: at 18:23

## 2020-09-03 RX ADMIN — CYCLOSPORINE 2.1 MILLIGRAM(S): 100 CAPSULE ORAL at 13:15

## 2020-09-03 RX ADMIN — HEPARIN SODIUM 5000 UNIT(S): 5000 INJECTION INTRAVENOUS; SUBCUTANEOUS at 05:19

## 2020-09-03 RX ADMIN — Medication 500 MILLIGRAM(S): at 00:03

## 2020-09-03 RX ADMIN — Medication 2: at 14:50

## 2020-09-03 RX ADMIN — Medication 16 MILLIGRAM(S): at 07:50

## 2020-09-03 RX ADMIN — Medication 500 MILLIGRAM(S): at 02:22

## 2020-09-03 RX ADMIN — CYCLOSPORINE 2.09 MILLIGRAM(S): 100 CAPSULE ORAL at 17:15

## 2020-09-03 RX ADMIN — Medication 2 UNIT(S): at 15:08

## 2020-09-03 RX ADMIN — Medication 1: at 05:46

## 2020-09-03 RX ADMIN — Medication 500 MILLIGRAM(S): at 20:53

## 2020-09-03 RX ADMIN — Medication 500 MILLIGRAM(S): at 17:16

## 2020-09-03 RX ADMIN — CHLORHEXIDINE GLUCONATE 1 APPLICATION(S): 213 SOLUTION TOPICAL at 04:16

## 2020-09-03 RX ADMIN — I.V. FAT EMULSION 20.8 ML/HR: 20 EMULSION INTRAVENOUS at 20:00

## 2020-09-03 NOTE — PROGRESS NOTE ADULT - ATTENDING COMMENTS
Patient seen and examined    Case discussed with Dr Infante    I agree with his interval history exam and plans as noted above    Abdominal exam possibly slightly improved  Remainder of exam unchanged  TPN started  Leukocytosis at 17k  afebrile    Would continue present therapy  Can send blood cultures but will try to avoid restarting antibiotics unless evidence bacteremia/fungemia    Gary Lujan MD  [phone]  After 5pm/weekends [phone]

## 2020-09-03 NOTE — PROGRESS NOTE ADULT - ASSESSMENT
Mr. 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 He was treated with Rituximab and high dose steroids. He is currently admitted with symptomatic anemia with Hgb initially of 6.6 requiring blood transfusions. Evaluation was not consistent with hemolysis. EGD/enteroscopy eventually revealed chronic gastritis and small bowel ectasias.  His course was complicated by development of severe leukopenia and acute respiratory distress and profound sinus tachycardia on 8/13 in setting of Klebsiella bacteremia of unclear origin (preceded EGD). A CT scan of the chest showed a possible infiltrate. While his bacteremia was treated, he subsequently developed C diff colitis with severe abdominal distention. He also developed worsening acute on chronic renal failure, likely due to volume depletion. This improved with volume resuscitation. He is currently volume overloaded and diuretics were started on 9/1 with good response. He has some ongoing improvement in abdominal distention, but has worsening leukocytosis of unclear etiology.     Plan discussed in multidisciplinary rounds with CTU team and CT surgery.

## 2020-09-03 NOTE — PROCEDURE NOTE - NSPOSTPRCRAD_GEN_A_CORE
post-procedure radiography performed
Distal L subclavian/central line located in the/no pneumothorax
post procedure radiography not performed

## 2020-09-03 NOTE — PROGRESS NOTE ADULT - SUBJECTIVE AND OBJECTIVE BOX
Behavioral Cardiology Psychological Assessment     History of present illness: Mr. Baez is a 70 year old man with history of dilated nonischemic cardiomyopathy, s/p OHT 2018, CKD III, HCV s/p treatment, and recently diagnosed autoimmune hemolytic anemia who is admitted with symptomatic anemia with Hgb of 6.6. Found to have chronic gastritis and small bowel ectasias. Has worsening abdominal distention with finding of ileus. CT scan with pancolitis. Started on IVIG , rectal vanc and Eravacylcine on .    Social history: , lives in a private house he owns in [place]. His younger brother (Rivas Davey) lives with him. Pt’s wife   and his only son was killed 5 years ago. He has an 17 y/o granddaughter that lives nearby with her mother and just started college this week. Reports a close relationship with his siblings (6 brothers and 1 sister) all but one brother live in [place]. Identified his adopted "godbrother" (Nawaf Barahona [phone]) as primary support person and HCP. Identified a close friend (Tahira) who lives close by as additional support and alternate HCP; she is very involved in his care. His brother (Rivas Davey age 45) lives with him. Mother  (age 79); father . He has 2 daughters from a previous relationship but is not part of their lives. Worked as a  at [employer] for 40+ years; retired 2 years ago. On disability. Information obtained from patient and chart. Education: 10th Grade (limited literacy skills).     Substance use:   Tobacco: Former, quit 50 years ago, smoked 2 cigarettes/day for 2-3 years.   Alcohol: Former alcohol use; stopped 6 yrs ago; drank 4-5 drinks of rum 5 days/week for 40 yrs.   Drug: Past occasional marijuana use for many years ago; no other nonprescription drug use.     Past psychiatric history: In past experienced depression related to bereavement issues following wife’s death () and son’s death (). Never sought treatment. Denies any other psychiatric issues. No history of s/a. No inpatient psychiatric admissions.     Psychological assessment: Mood stressed. Endorsed worry about current health status; concerned team is withholding information and that he is dying. Talked about his mother who  of unknown cause but recalls she had diffuse swelling, worried he has same condition. Appears more anxious today. Receptive to support and validation. Denies s/i.      Mental status exam: Seen lying in bed in CTU. Cooperative, well related with good eye contact. Awake, oriented x2 (unable to identify month, date). Speech normal rate/volume. Thought process goal directed. No evidence of any psychosis, jona, delusions. Mood stressed. Affect anxious. No s/i. Insight and judgment adequate.

## 2020-09-03 NOTE — PROGRESS NOTE ADULT - ASSESSMENT
Mood stressed. Endorsed worry about current health status; concerned team is withholding information and that he is dying. Talked about his mother who  of unknown cause but recalls she had diffuse swelling. Appears more anxious today. Interested in speaking with team members to better understand his illness. Receptive to support and validation. Denies s/i.      Dx: Adjustment disorder; unspecified F43.20; r/o Delirium F05     Recommendations:   ·	Explain all information in simple, concise language with teach back to ensure understanding  ·	Include support system in all education   ·	Holistic RN  ·	Behavioral Cardiology will continue to follow

## 2020-09-03 NOTE — PROGRESS NOTE ADULT - ASSESSMENT
A/P:  70y M with history of heart transplant in 2/2018 admitted with autoimmune hemolytic anemia and dark stools requiring transfusion. Found to have c diff and abdominal distension on 8/23.  Pt developed Ileus w/ NGT placement and remains NPO.    TPN consulted to assist w/ management in pt who will be NPO for a prolonged period of time  Pt w/ Acute Severe Protein-Calorie Malnutrition    Bring TPN to GOAL TPN today  135g AA, 280g Dextrose, & 50g Lipids      - Central line w/ designated port for TPN to be placed  - Strict Intake and Output- fluid overload, cardiac patient             Sheldon resolving  - HyperMg- improving w/ MgSO4 8mEq  - HypoPhos improving w/ 30mMol NaPhos              continue to monitor  - Weights three times a week  - Monitor  CMP, Mg, iCa, & Phos daily  - Check Triglycerides and monitor daily until TG stable for 3days of Lipids at GOAL    - Baseline Prealbumin, then check weekly  - Hyperglycemia- increase to 15U insulin as going to goal             FS q6H w/ ISS coverage to be ordered by primary team  - Continue monitoring as per CTU w/ Surgery, will follow with you, d/w CTU team    SHELLY Stafford-C  [phone]) [phone]  d/w Dr Tariq

## 2020-09-03 NOTE — PROGRESS NOTE ADULT - ATTENDING COMMENTS
Patient seen/examined.  Agree w above note and plan and have discussed plan w house staff.  NPO till less distended    Ayden Mendoza MD

## 2020-09-03 NOTE — PROGRESS NOTE ADULT - PROBLEM SELECTOR PLAN 2
- We will adjust IV cyclosporine as necessary. Currently at 15mg/24hours to reduce overall level of immunosuppression. Everolimus level was 5.3 on 8/31 and has been discontinued.     - Continue current methylprednisone dose (equivalent to 20 mg daily of prednisone). We will gradually decrease the dose.

## 2020-09-03 NOTE — PROGRESS NOTE ADULT - SUBJECTIVE AND OBJECTIVE BOX
Subjective: He notes that he feels better this morning with less abdominal distention. He had a bowel movement yesterday. He is not short of breath.     Medications:  benzocaine 15 mG/menthol 3.6 mG (Sugar-Free) Lozenge 1 Lozenge Oral every 6 hours PRN  chlorhexidine 2% Cloths 1 Application(s) Topical <User Schedule>  cycloSPORINE  (SandIMMUNE) IVPB 15 milliGRAM(s) IV Intermittent every 24 hours  Eravacycline (Xerava) 75 milliGRAM(s),Sodium Chloride 0.9% 150 milliLiter(s) 75 milliGRAM(s) IV Intermittent every 12 hours  fat emulsion (Fish Oil and Plant Based) 20% Infusion 10.4 mL/Hr IV Continuous <Continuous>  heparin   Injectable 5000 Unit(s) SubCutaneous every 8 hours  insulin lispro (HumaLOG) corrective regimen sliding scale   SubCutaneous every 8 hours  methylPREDNISolone sodium succinate Injectable 16 milliGRAM(s) IV Push <User Schedule>  pantoprazole  Injectable 40 milliGRAM(s) IV Push two times a day  Parenteral Nutrition - Adult 1 Each TPN Continuous <Continuous>  vancomycin    Solution 500 milliGRAM(s) Oral every 6 hours  vancomycin  Retention Enema 500 milliGRAM(s) Rectal every 6 hours      Physical Exam:    Vital Signs Last 24 Hrs  T(C): 36.5 (03 Sep 2020 04:00), Max: 36.6 (02 Sep 2020 11:00)  T(F): 97.7 (03 Sep 2020 04:00), Max: 97.9 (02 Sep 2020 11:00)  HR: 109 (03 Sep 2020 06:00) (99 - 119)  BP: 126/84 (03 Sep 2020 06:00) (109/80 - 145/95)  BP(mean): 101 (03 Sep 2020 06:00) (92 - 116)  RR: 43 (03 Sep 2020 06:00) (12 - 43)  SpO2: 99% (03 Sep 2020 06:00) (95% - 100%)    Daily     Daily Weight in k.4 (03 Sep 2020 01:00)    I&O's Summary    02 Sep 2020 07:01  -  03 Sep 2020 07:00  --------------------------------------------------------  IN: 1649.2 mL / OUT: 3500 mL / NET: -1850.8 mL    General: No distress. Comfortable.  HEENT: EOM intact.  Neck: Neck supple. JVP moderately elevated. No masses  Chest: Clear to auscultation bilaterally  CV: Normal S1 and S2. No rubs or gallops. Radial pulses normal. 2+ edema in legs.   Abdomen: Distended, less firm. Tympanic. Non-tender to palpation.   Skin: No rashes  Neurology: Alert and oriented. Sensation intact  Psych: Affect normal    Labs:                        8.4    17.45 )-----------( 170      ( 03 Sep 2020 05:49 )             25.8         140  |  108  |  87<H>  ----------------------------<  549<HH>  5.6<H>   |  20<L>  |  1.71<H>    Ca    8.3<L>      03 Sep 2020 05:49  Phos  6.5       Mg     2.7         TPro  7.4  /  Alb  1.5<L>  /  TBili  0.9  /  DBili  x   /  AST  55<H>  /  ALT  34  /  AlkPhos  83      Cyclosporine Level: 74: CYCLOSPORINE: Assay performed by Chemiluminescent Microparticle  Immunoassay (CMIA) on the [employer] system.  All immunoassay tests  are subject to rare interferences from heterophile antibodies so that if  atypical or unexpected results are obtained the lab should be notified to  confirm this result by an alternative method before adjusting medication  dosage. The therapeutic range of 150-400 ng/mL is based on trough levels  of Cyclosporine but levels for clinical management will vary depending on  the organ transplanted, post-dose time of draw, length of time  post-transplant and the individual patient's metabolism. ng/mL (20 @ 09:09)    Everolimus, Whole Blood Result: 5.3: -------------------ADDITIONAL INFORMATION-------------------  Target steady-state trough concentrations vary depending on  the type of transplant, concomitant immunosuppression,  clinical/institutional protocols, and time post-transplant.  Results should be interpreted in conjunction with this  clinical information and any physical signs/symptoms of  rejection/toxicity.  Testing performed by Liquid Chromatography-Tandem Mass  Spectrometry (LC-MS/MS).  This test was developed and its performance characteristics  determined by [facility] in a manner consistent with CLIA  requirements. This test has not been cleared or approved by  the U.S. Food and Drug Administration.  Test Performed by:  [place] [address]  : Marcus Caballero M.D. Ph.D.; CLIA# [id number] ng/mL (20 @ 16:09)

## 2020-09-03 NOTE — PROGRESS NOTE ADULT - SUBJECTIVE AND OBJECTIVE BOX
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 Patient was treated w/ prednisone and Rituximab. Tacro was discontinued and patient was also transitioned to everolimus  Admitted  6/16-6/19  for hyperglycemia.  Reported to transplant team having one done black tarry stool-  instructed to  present to [facility] for hemolytic anemia. Patient has been following with Hematology outpatient.  Denies CP  N/V diarrhea SOB. (11 Aug 2020 20:08)      Surgery/Hospital course:    Today:    REVIEW OF SYSTEMS:  Gen: No fever  EYES/ENT: No visual changes;  No vertigo or throat pain   NECK: No pain   RES:  No shortness of breath or Cough  Chest: + incisional pain  CARD: No chest pain   GI: No abdominal pain  : No dysuria  NEURO: No weakness  SKIN: No itching, rashes     Physical Exam:  Vital Signs Last 24 Hrs  T(C): 36.4 (03 Sep 2020 20:00), Max: 36.6 (03 Sep 2020 00:00)  T(F): 97.6 (03 Sep 2020 20:00), Max: 97.8 (03 Sep 2020 00:00)  HR: 108 (03 Sep 2020 22:00) (101 - 111)  BP: 143/86 (03 Sep 2020 22:00) (112/84 - 159/83)  BP(mean): 102 (03 Sep 2020 22:00) (88 - 118)  RR: 14 (03 Sep 2020 22:00) (12 - 53)  SpO2: 98% (03 Sep 2020 22:00) (95% - 100%)  Gen:  Awake, alert   CNS: non focal 	  Neck: no JVD  RES : clear , no wheezing    Chest:   + chest tubes                     CVS: Regular  rhythm. Normal S1/S2  Abd: Soft, non-distended. Bowel sounds present.  Skin: No rash.  Ext:  no edema, A Line  PSY:  ============================I/O===========================   I&O's Detail    02 Sep 2020 07:01  -  03 Sep 2020 07:00  --------------------------------------------------------  IN:    Enteral Tube Flush: 30 mL    fat emulsion (Fish Oil and Plant Based) 20% Infusion: 816 mL    Solution: 300 mL    Solution: 48.3 mL    Solution: 525 mL  Total IN: 1719.3 mL    OUT:    Indwelling Catheter - Urethral: 2850 mL    Nasoenteral Tube: 650 mL  Total OUT: 3500 mL    Total NET: -1780.7 mL      03 Sep 2020 07:01  -  03 Sep 2020 22:55  --------------------------------------------------------  IN:    Enteral Tube Flush: 50 mL    fat emulsion (Fish Oil and Plant Based) 20% Infusion: 612 mL    fat emulsion (Fish Oil and Plant Based) 20% Infusion: 394 mL    Solution: 150 mL    Solution: 25.2 mL  Total IN: 1231.2 mL    OUT:    Indwelling Catheter - Urethral: 1920 mL    Nasoenteral Tube: 125 mL  Total OUT: 2045 mL    Total NET: -813.8 mL        ============================ LABS =========================                        8.4    17.45 )-----------( 170      ( 03 Sep 2020 05:49 )             25.8     09-03    144  |  112<H>  |  93<H>  ----------------------------<  183<H>  3.8   |  22  |  1.72<H>    Ca    8.2<L>      03 Sep 2020 10:18  Phos  3.4     09-03  Mg     2.5     09-03    TPro  8.0  /  Alb  1.9<L>  /  TBili  1.0  /  DBili  0.4<H>  /  AST  51<H>  /  ALT  24  /  AlkPhos  92  09-03    LIVER FUNCTIONS - ( 03 Sep 2020 10:18 )  Alb: 1.9 g/dL / Pro: 8.0 g/dL / ALK PHOS: 92 U/L / ALT: 24 U/L / AST: 51 U/L / GGT: x           PT/INR - ( 03 Sep 2020 18:34 )   PT: 14.4 sec;   INR: 1.22 ratio         PTT - ( 03 Sep 2020 18:34 )  PTT:36.7 sec      ======================Micro/Rad/Cardio=================  Culture: Reviewed   CXR: Reviewed  Echo:Reviewed  ======================================================  PAST MEDICAL & SURGICAL HISTORY:  H/O hemolytic anemia  H/O autoimmune hemolytic anemia  Knee pain, right  HLD (hyperlipidemia)  Former smoker  DVT of upper extremity (deep vein thrombosis)  Hepatitis C virus  GIB (gastrointestinal bleeding)  Ventricular fibrillation: s/p AICD  PAF (paroxysmal atrial fibrillation): on xarelto  Non-Ischemic Cardiomyopathy  SVT (Supraventricular Tachycardia)  HTN  CHF (Congestive Heart Failure)  S/P right heart catheterization: biopsy multiple  H/O heart transplant: 2/2018  Status post left hip replacement  History of Prior Ablation Treatment: for afib  AICD (Automatic Cardioverter/Defibrillator) Present: [employer] with 1 [employer] lead4/1/09- explanted and replaced with Medtronic 2 leads on 9/2/09    ====================ASSESMENT ==============  CNS:  RES:  CVS:  Hem:  Cash:  GI:  Endo:  ID:  Skin  Plan:  ====================== NEUROLOGY=====================    ==================== RESPIRATORY======================  Mechanical Ventilation:      ====================CARDIOVASCULAR==================    ===================HEMATOLOGIC/ONC ===================  heparin   Injectable 5000 Unit(s) SubCutaneous every 8 hours    ===================== RENAL =========================  Rosas for monitoring urine output    ==================== GASTROINTESTINAL===================  fat emulsion (Fish Oil and Plant Based) 20% Infusion 20.8 mL/Hr (20.8 mL/Hr) IV Continuous <Continuous>  pantoprazole  Injectable 40 milliGRAM(s) IV Push two times a day  Parenteral Nutrition - Adult 1 Each (58 mL/Hr) TPN Continuous <Continuous>  Parenteral Nutrition - Adult 1 Each (58 mL/Hr) TPN Continuous <Continuous>    =======================    ENDOCRINE  =====================  insulin lispro (HumaLOG) corrective regimen sliding scale   SubCutaneous every 6 hours  methylPREDNISolone sodium succinate Injectable 16 milliGRAM(s) IV Push <User Schedule>    ========================INFECTIOUS DISEASE================  vancomycin    Solution 500 milliGRAM(s) Oral every 6 hours  vancomycin  Retention Enema 500 milliGRAM(s) Rectal every 6 hours    .crit YVONNEBILLY NGUYEN  MRN-[id number]  Patient is a 70y old  Male who presents with a chief complaint of SOB/anemia (03 Sep 2020 17:17)    HPI:  This is a 70y Male w/ NICM s/p HM2 s/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 Patient was treated w/ prednisone and Rituximab. Tacro was discontinued and patient was also transitioned to everolimus  Admitted  6/16-6/19  for hyperglycemia.  Reported to transplant team having one done black tarry stool-  instructed to  present to [facility] for hemolytic anemia. Patient has been following with Hematology outpatient.  Denies CP  N/V diarrhea SOB. (11 Aug 2020 20:08)      Hospital course:  8/11 Admitted to [facility] with symptomatic anemia  8/13 EGD for investigation of tarry stools  8/15 SB capsule: stomach & SB lesions, likely ulcers.  8/17 EGD/push enteroscopy w/ angioectasias/erosions in small bowel. Gastropathy and esophagitis. Ulcer seen on VCE most likely scope trauma.    8/18 VSS transferred back to floor.   8/20 VS 9.0/28.4 stable Gastric biopsy results LA Grade A esophagitis.  - Acute gastritis. Biopsies taken to r/o CMV, No ulceration seen despite finding on capsule endoscopy - likely 2/2 scope  trauma that has since resolved. Pathology pending.  8/21 VSS H/H  8.1/25.7  trending down will monitor prednisone taper 30 mg today. Procardia 120 initiated today RHC biopsy Monday.   8/22 VVS; Patient reports loose stools x 2-3 days with 1 episode today.  Stool sent for C-dif and GI PCR. Abdominal X-ray revealed: dilated loops of bowel. Abdomen distended.  Patient denies N/V. GI called to re-evaluate. Continue with current medication regimen.  Awaiting for upcoming cardiac biopsy on Monday 8/24.  8/23   vss    creat up to 2.28 ,  repeating CMP,  TTE ordered  Bladder scan   8/24   cardiac bx cancelled   elev creat  to 2.74   cardio renal cslt called,    + CDIF   inc vanco to 500 q6   ID following  8/25 VSS: RHC today as per transplant team; transfuse 2 units PRBC today as per Dr. Viramontes; continue vanco for cdiff; transfer patient to CTU after cath today   8/26. Dieretic challenge with good response   8/27: Downgraded to the floor then upgraded to the CTU again for concerning of abd distention   8/28 CT ABD & Pelvis reveals pancolitis  8/30: repeat CT scan of abd, gen surgery called to re-evaluated pt.      Today:  Remain distended abdomen,  NG tube for drainage   nutrition support with TPN   cyclosporin  continuous infusion    REVIEW OF SYSTEMS:  Gen: No fever  EYES/ENT: No visual changes;  No vertigo or throat pain   NECK: No pain   RES:  No shortness of breath or Cough  CARD: No chest pain   GI: + abdominal  distension  : No dysuria  NEURO: No weakness  SKIN: No itching, rashes     Physical Exam:  Vital Signs Last 24 Hrs  T(C): 36.4 (03 Sep 2020 20:00), Max: 36.6 (03 Sep 2020 00:00)  T(F): 97.6 (03 Sep 2020 20:00), Max: 97.8 (03 Sep 2020 00:00)  HR: 108 (03 Sep 2020 22:00) (101 - 111)  BP: 143/86 (03 Sep 2020 22:00) (112/84 - 159/83)  BP(mean): 102 (03 Sep 2020 22:00) (88 - 118)  RR: 14 (03 Sep 2020 22:00) (12 - 53)  SpO2: 98% (03 Sep 2020 22:00) (95% - 100%)  Gen:  Awake, alert   CNS: non focal 	  Neck: no JVD  RES : clear , no wheezing                    CVS: Regular  rhythm. Normal S1/S2  Abd: Soft, non-distended. Bowel sounds present.  Skin: No rash.  Ext:  +  edema,   PSY:  ============================I/O===========================   I&O's Detail    02 Sep 2020 07:01  -  03 Sep 2020 07:00  --------------------------------------------------------  IN:    Enteral Tube Flush: 30 mL    fat emulsion (Fish Oil and Plant Based) 20% Infusion: 816 mL    Solution: 300 mL    Solution: 48.3 mL    Solution: 525 mL  Total IN: 1719.3 mL    OUT:    Indwelling Catheter - Urethral: 2850 mL    Nasoenteral Tube: 650 mL  Total OUT: 3500 mL    Total NET: -1780.7 mL      03 Sep 2020 07:01  -  03 Sep 2020 22:55  --------------------------------------------------------  IN:    Enteral Tube Flush: 50 mL    fat emulsion (Fish Oil and Plant Based) 20% Infusion: 612 mL    fat emulsion (Fish Oil and Plant Based) 20% Infusion: 394 mL    Solution: 150 mL    Solution: 25.2 mL  Total IN: 1231.2 mL    OUT:    Indwelling Catheter - Urethral: 1920 mL    Nasoenteral Tube: 125 mL  Total OUT: 2045 mL    Total NET: -813.8 mL        ============================ LABS =========================                        8.4    17.45 )-----------( 170      ( 03 Sep 2020 05:49 )             25.8     09-03    144  |  112<H>  |  93<H>  ----------------------------<  183<H>  3.8   |  22  |  1.72<H>    Ca    8.2<L>      03 Sep 2020 10:18  Phos  3.4     09-03  Mg     2.5     09-03    TPro  8.0  /  Alb  1.9<L>  /  TBili  1.0  /  DBili  0.4<H>  /  AST  51<H>  /  ALT  24  /  AlkPhos  92  09-03    LIVER FUNCTIONS - ( 03 Sep 2020 10:18 )  Alb: 1.9 g/dL / Pro: 8.0 g/dL / ALK PHOS: 92 U/L / ALT: 24 U/L / AST: 51 U/L / GGT: x           PT/INR - ( 03 Sep 2020 18:34 )   PT: 14.4 sec;   INR: 1.22 ratio         PTT - ( 03 Sep 2020 18:34 )  PTT:36.7 sec      ======================Micro/Rad/Cardio=================  Culture: Reviewed   CXR: Reviewed  Echo:Reviewed  ======================================================  PAST MEDICAL & SURGICAL HISTORY:  H/O hemolytic anemia  H/O autoimmune hemolytic anemia  Knee pain, right  HLD (hyperlipidemia)  Former smoker  DVT of upper extremity (deep vein thrombosis)  Hepatitis C virus  GIB (gastrointestinal bleeding)  Ventricular fibrillation: s/p AICD  PAF (paroxysmal atrial fibrillation): on xarelto  Non-Ischemic Cardiomyopathy  SVT (Supraventricular Tachycardia)  HTN  CHF (Congestive Heart Failure)  S/P right heart catheterization: biopsy multiple  H/O heart transplant: 2/2018  Status post left hip replacement  History of Prior Ablation Treatment: for afib  AICD (Automatic Cardioverter/Defibrillator) Present: [employer] with 1 [employer] lead4/1/09- explanted and replaced with Medtronic 2 leads on 9/2/09    ====================Heart transplant 2/2018 for ACC/AHA stage D NICM   Heart transplant 2/2018 for ACC/AHA stage D NICM    abdominal distention   C diff colitis   ileus  Leukocytosis  Severe hypertension  Acute blood loss anemia, stable   CKD Stage III  C. diff diarrhea  Klebsiella oxytoca bacteremia  Sepsis  Azotemia 2/2 CKD and Steroids   Pancolitis  ==============    Pancolitis    Plan:  ====================== NEUROLOGY=====================    ==================== RESPIRATORY======================   nasal cannula oxygen support    ====================CARDIOVASCULAR==================   continue antirejection meds, cyclosporine infusion, steroids  ===================HEMATOLOGIC/ONC ===================  heparin   Injectable 5000 Unit(s) SubCutaneous every 8 hours    ===================== RENAL =========================  Rosas for monitoring urine output    ==================== GASTROINTESTINAL===================  fat emulsion (Fish Oil and Plant Based) 20% Infusion 20.8 mL/Hr (20.8 mL/Hr) IV Continuous <Continuous>  pantoprazole  Injectable 40 milliGRAM(s) IV Push two times a day  Parenteral Nutrition - Adult 1 Each (58 mL/Hr) TPN Continuous <Continuous>  Parenteral Nutrition - Adult 1 Each (58 mL/Hr) TPN Continuous <Continuous>    =======================    ENDOCRINE  =====================   glycemic control insulin drip, titrate  insulin lispro (HumaLOG) corrective regimen sliding scale   SubCutaneous every 6 hours  methylPREDNISolone sodium succinate Injectable 16 milliGRAM(s) IV Push <User Schedule>    ========================INFECTIOUS DISEASE================  vancomycin    Solution 500 milliGRAM(s) Oral every 6 hours  vancomycin  Retention Enema 500 milliGRAM(s) Rectal every 6 hours  Eravacycline    Patient requires continuous monitoring with:  bedside rhythm monitoring, , pulse oximetry,   Care plan discussed with ICU care team.  patient remain critical; required more than usual post op care; I have spent 35 minutes providing non routine post op care, revaluated multiple times during the day .

## 2020-09-03 NOTE — PROGRESS NOTE ADULT - ASSESSMENT
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 s/p EGD with esophagitis and gastritis. Developed Klebsiella oxytoca bacteremia requiring transfer to CTU. Clinically improving, transferred to [facility], finished 10-day of ceftriaxone, however, developed severe C.diff colitis on Vancomycin 500mg q6h PO and IV Flagyl. He had RHC on 8/25 and found to have high filling pressure so transferred to CTU again.    #severe C.diff colitis with NORMAN- C.diff PCR detected (8/23). Repeat CT on 8/30 without evidence of toxic megacolon. Endorses feeling better. Appears same as yesterday. 2 loose Bm/day  - still with leukocytosis now 12--> 17  - cont vancomycin 500 mg per rectum every 6 hours  - cont PO Vanco 500 q 6h   - discontinued IV flagyl (8/24-9/1)  - cont ERAVAYCLINE 1 mg/kg (750 mg)  every 12 hours for total 7 days (8/31- 9/6)  - completed IVIG x5 days (8/29-9/1)  - not eligible for fecal transplant     #leukocytosis - 2/2 ongoing c diff vs other infection.  - recommend surveillance blood cultures as leukocytosis still increasing  - low threshold to restart zosyn with fever    #Klebsiella oxytoca bacteremia: resolved   Meropenem (8/14-8/17)  Cefepime (8/17-20)  Ceftriaxone (8/20-)  - Growing in blood cultures 2/2 sets on 8/13  - Repeat blood cultures x2 sets 8/14 no growth final  - s/p ceftriaxone 8/14-24   - blood cultures 8/25 NG    #OI prophylaxis-  -has been receiving high dose steroids since 5/20  -On IV cyclosporine now  -CMV viral load(8/17, 8/26): neg  -Valcyte and Bactrim d/c'ed on 8/17 due to leukopenia  -Galactomann<0.5, Fungitell<31  -Per hemoc, will taper prednisone every 7 days by 10mg  -Will consider IV bactrim for PCP ppx when able to tolerated PO    #Pancytopenia- on admission  - leukopenia resolved, WBC increasing. platelets improving  -COVID PCR neg  -Tick panel neg  - treating cdiff colitis    #Anemia- r/o GI bleeding  s/p EGD  EGD 8/17: esophagitis, acute gastritis s/p biopsy, no ulceration  On PPI 40mg daily  - cont to monitor cbc      Cm Infante MD  Fellow, Infectious Diseases, PGY-4  Pager: [phone]  After 5pm/Weekends: Call [phone]

## 2020-09-03 NOTE — PROGRESS NOTE ADULT - SUBJECTIVE AND OBJECTIVE BOX
ID Follow Up      Interval History/ROS:Patient is a 70y old  Male who presents with a chief complaint of SOB/anemia (03 Sep 2020 12:15)  Pt with central line being placed for TPN. 1 BM in last night per RN    Allergies  No Known Allergies    Intolerances  None      ANTIMICROBIALS:  vancomycin    Solution 500 every 6 hours  vancomycin  Retention Enema 500 every 6 hours      OTHER MEDS:  benzocaine 15 mG/menthol 3.6 mG (Sugar-Free) Lozenge 1 Lozenge Oral every 6 hours PRN  chlorhexidine 2% Cloths 1 Application(s) Topical <User Schedule>  cycloSPORINE  (SandIMMUNE) IVPB 15 milliGRAM(s) IV Intermittent every 24 hours  Eravacycline (Xerava) 75 milliGRAM(s),Sodium Chloride 0.9% 150 milliLiter(s) 75 milliGRAM(s) IV Intermittent every 12 hours  fat emulsion (Fish Oil and Plant Based) 20% Infusion 20.8 mL/Hr IV Continuous <Continuous>  heparin   Injectable 5000 Unit(s) SubCutaneous every 8 hours  insulin lispro (HumaLOG) corrective regimen sliding scale   SubCutaneous every 6 hours  methylPREDNISolone sodium succinate Injectable 16 milliGRAM(s) IV Push <User Schedule>  pantoprazole  Injectable 40 milliGRAM(s) IV Push two times a day  Parenteral Nutrition - Adult 1 Each TPN Continuous <Continuous>  Parenteral Nutrition - Adult 1 Each TPN Continuous <Continuous>      Vital Signs Last 24 Hrs  T(C): 36.4 (03 Sep 2020 12:00), Max: 36.6 (02 Sep 2020 20:00)  T(F): 97.5 (03 Sep 2020 12:00), Max: 97.9 (02 Sep 2020 20:00)  HR: 101 (03 Sep 2020 15:00) (101 - 115)  BP: 112/84 (03 Sep 2020 15:00) (109/80 - 150/96)  BP(mean): 93 (03 Sep 2020 15:00) (88 - 118)  RR: 13 (03 Sep 2020 15:00) (12 - 43)  SpO2: 100% (03 Sep 2020 15:00) (95% - 100%)    EXAM:  Constitutional: Not in acute distress  Eyes: No icterus. Pupils b/l reactive to light.  Oral cavity: Clear, no lesions  Neck: No neck vein distension noted  RS: Chest clear to auscultation bilaterally. No wheeze/rhonchi/crepitations.  CVS: S1, S2 heard. Regular rate and rhythm. No murmurs/rubs/gallops.  Abdomen: Soft. No guarding/rigidity/tenderness.  : No acute abnormalities  Extremities: Warm. No pedal edema  Skin: No lesions noted  Vascular: No evidence of phlebitis  Neuro: Alert, oriented to time/place/person    Labs:                        8.4    17.45 )-----------( 170      ( 03 Sep 2020 05:49 )             25.8       09-03    144  |  112<H>  |  93<H>  ----------------------------<  183<H>  3.8   |  22  |  1.72<H>    Ca    8.2<L>      03 Sep 2020 10:18  Phos  3.4     09-03  Mg     2.5     09-03    TPro  8.0  /  Alb  1.9<L>  /  TBili  1.0  /  DBili  0.4<H>  /  AST  51<H>  /  ALT  24  /  AlkPhos  92  09-03          MICROBIOLOGY:    RADIOLOGY:  < from: Xray Chest 1 View- PORTABLE-Urgent (09.03.20 @ 11:02) >  IMPRESSION:  The mediastinal cardiac silhouette is unremarkable. Nasogastric tube in stomach. Left central line unchanged.  Right effusion and atelectasis unchanged. Clear left lung.  No acute osseous finding. ID Follow Up      Interval History/ROS:Patient is a 70y old  Male who presents with a chief complaint of SOB/anemia (03 Sep 2020 12:15)  Pt with central line being placed for TPN. 1 BM in last night per RN    Allergies  No Known Allergies    Intolerances  None      ANTIMICROBIALS:  vancomycin    Solution 500 every 6 hours  vancomycin  Retention Enema 500 every 6 hours      OTHER MEDS:  benzocaine 15 mG/menthol 3.6 mG (Sugar-Free) Lozenge 1 Lozenge Oral every 6 hours PRN  chlorhexidine 2% Cloths 1 Application(s) Topical <User Schedule>  cycloSPORINE  (SandIMMUNE) IVPB 15 milliGRAM(s) IV Intermittent every 24 hours  Eravacycline (Xerava) 75 milliGRAM(s),Sodium Chloride 0.9% 150 milliLiter(s) 75 milliGRAM(s) IV Intermittent every 12 hours  fat emulsion (Fish Oil and Plant Based) 20% Infusion 20.8 mL/Hr IV Continuous <Continuous>  heparin   Injectable 5000 Unit(s) SubCutaneous every 8 hours  insulin lispro (HumaLOG) corrective regimen sliding scale   SubCutaneous every 6 hours  methylPREDNISolone sodium succinate Injectable 16 milliGRAM(s) IV Push <User Schedule>  pantoprazole  Injectable 40 milliGRAM(s) IV Push two times a day  Parenteral Nutrition - Adult 1 Each TPN Continuous <Continuous>  Parenteral Nutrition - Adult 1 Each TPN Continuous <Continuous>      Vital Signs Last 24 Hrs  T(C): 36.4 (03 Sep 2020 12:00), Max: 36.6 (02 Sep 2020 20:00)  T(F): 97.5 (03 Sep 2020 12:00), Max: 97.9 (02 Sep 2020 20:00)  HR: 101 (03 Sep 2020 15:00) (101 - 115)  BP: 112/84 (03 Sep 2020 15:00) (109/80 - 150/96)  BP(mean): 93 (03 Sep 2020 15:00) (88 - 118)  RR: 13 (03 Sep 2020 15:00) (12 - 43)  SpO2: 100% (03 Sep 2020 15:00) (95% - 100%)    EXAM:  Constitutional: Not in acute distress  Eyes: No icterus. Pupils b/l reactive to light.  Oral cavity: Clear, no lesions  RS: diminished breath sounds bilaterally. No wheeze/rhonchi/crepitations.  CVS: S1, S2 heard. Regular rate and rhythm. No murmurs/rubs/gallops.  Abdomen: distended, no bowel sounds heard. No guarding or tenderness.  : No acute abnormalities  Extremities: Warm. UE edema R>L +2 pitting edema. LE b/l +2 pitting edema.  Skin: No lesions noted  Vascular: No evidence of phlebitis  Neuro: Alert, oriented to time/place/person    Labs:                        8.4    17.45 )-----------( 170      ( 03 Sep 2020 05:49 )             25.8       09-03    144  |  112<H>  |  93<H>  ----------------------------<  183<H>  3.8   |  22  |  1.72<H>    Ca    8.2<L>      03 Sep 2020 10:18  Phos  3.4     09-03  Mg     2.5     09-03    TPro  8.0  /  Alb  1.9<L>  /  TBili  1.0  /  DBili  0.4<H>  /  AST  51<H>  /  ALT  24  /  AlkPhos  92  09-03          MICROBIOLOGY:    RADIOLOGY:  < from: Xray Chest 1 View- PORTABLE-Urgent (09.03.20 @ 11:02) >  IMPRESSION:  The mediastinal cardiac silhouette is unremarkable. Nasogastric tube in stomach. Left central line unchanged.  Right effusion and atelectasis unchanged. Clear left lung.  No acute osseous finding.

## 2020-09-03 NOTE — PROGRESS NOTE ADULT - PROBLEM SELECTOR PLAN 1
Will continue medications per ID guidance. Fecal transplant not currently an option.  He will have ongoing serial abdominal evaluations per surgery.  Leukocytosis is worsening. We will discuss with ID. Central catheter has been removed.

## 2020-09-03 NOTE — PROGRESS NOTE ADULT - SUBJECTIVE AND OBJECTIVE BOX
SURGERY DAILY PROGRESS NOTE:      S:   Patient seen and examined. No acute events overnight. Abdominal distension noted to be improving.  Pain is well controlled. GI fxn +/+. Tolerating diet w/o N/V      O:   Exam:  Gen: NAD. A&Ox3.  Well developed, alert and cooperative.   Resp: No additional work of breathing.   Card: RR. No peripheral edema or pallor.   Abd: Soft, distended. Non-tender in all quadrants.  Ext: WWP. Able to move all extremities equally.    Vitals   Vital Signs Last 24 Hrs  T(C): 36.5 (03 Sep 2020 04:00), Max: 36.6 (02 Sep 2020 11:00)  T(F): 97.7 (03 Sep 2020 04:00), Max: 97.9 (02 Sep 2020 11:00)  HR: 111 (03 Sep 2020 05:00) (99 - 119)  BP: 138/92 (03 Sep 2020 05:00) (106/77 - 145/95)  BP(mean): 100 (03 Sep 2020 05:00) (88 - 116)  RR: 37 (03 Sep 2020 05:00) (12 - 37)  SpO2: 97% (03 Sep 2020 05:00) (95% - 100%)    LABS:                        8.7    12.12 )-----------( 153      ( 02 Sep 2020 01:36 )             27.6     09-02    143  |  110<H>  |  84<H>  ----------------------------<  111<H>  3.5   |  23  |  1.95<H>    Ca    7.9<L>      02 Sep 2020 01:36  Phos  3.7     09-02  Mg     3.0     09-02    TPro  7.5  /  Alb  2.1<L>  /  TBili  0.7  /  DBili  x   /  AST  39  /  ALT  20  /  AlkPhos  84  09-02          I&O's Detail    01 Sep 2020 07:01  -  02 Sep 2020 07:00  --------------------------------------------------------  IN:    multiple electrolytes Injection Type 1multiple electrolytes Injection Type 1: 1200 mL    Solution: 46.2 mL    Solution: 100 mL    Solution: 750 mL    Solution: 300 mL  Total IN: 2396.2 mL    OUT:    Indwelling Catheter - Urethral: 2155 mL    Nasoenteral Tube: 300 mL  Total OUT: 2455 mL    Total NET: -58.8 mL      02 Sep 2020 07:01  -  03 Sep 2020 05:58  --------------------------------------------------------  IN:    Enteral Tube Flush: 30 mL    fat emulsion (Fish Oil and Plant Based) 20% Infusion: 748 mL    Solution: 525 mL    Solution: 300 mL    Solution: 46.2 mL  Total IN: 1649.2 mL    OUT:    Indwelling Catheter - Urethral: 2850 mL    Nasoenteral Tube: 650 mL  Total OUT: 3500 mL    Total NET: -1850.8 mL

## 2020-09-03 NOTE — PROGRESS NOTE ADULT - SUBJECTIVE AND OBJECTIVE BOX
[facility] NUTRITION SUPPORT / TPN -- FOLLOW UP NOTE  --------------------------------------------------------------------------------    24 hour events/subjective:    (+)NGT  No n/v  No flatus  (+)liquid stool  no f/c/s  no dyspnea/ sob/ palp/ cp      Diet:  Diet, NPO:   Except Medications (08-27-20 @ 20:17)      Appetite: [ x ]Poor [  ]Adequate [  ]Good  Caloric intake:  [   ]  Adequate   [ x  ] Inadequate    ROS: General/ GI see HPI  all other systems negative      ALLERGIES & MEDICATIONS  --------------------------------------------------------------------------------  No Known Allergies      STANDING INPATIENT MEDICATIONS    chlorhexidine 2% Cloths 1 Application(s) Topical <User Schedule>  cycloSPORINE  (SandIMMUNE) IVPB 15 milliGRAM(s) IV Intermittent every 24 hours  Eravacycline (Xerava) 75 milliGRAM(s),Sodium Chloride 0.9% 150 milliLiter(s) 75 milliGRAM(s) IV Intermittent every 12 hours  fat emulsion (Fish Oil and Plant Based) 20% Infusion 20.8 mL/Hr IV Continuous <Continuous>  heparin   Injectable 5000 Unit(s) SubCutaneous every 8 hours  insulin lispro (HumaLOG) corrective regimen sliding scale   SubCutaneous every 8 hours  methylPREDNISolone sodium succinate Injectable 16 milliGRAM(s) IV Push <User Schedule>  pantoprazole  Injectable 40 milliGRAM(s) IV Push two times a day  Parenteral Nutrition - Adult 1 Each TPN Continuous <Continuous>  Parenteral Nutrition - Adult 1 Each TPN Continuous <Continuous>  vancomycin    Solution 500 milliGRAM(s) Oral every 6 hours  vancomycin  Retention Enema 500 milliGRAM(s) Rectal every 6 hours      PRN INPATIENT MEDICATION  benzocaine 15 mG/menthol 3.6 mG (Sugar-Free) Lozenge 1 Lozenge Oral every 6 hours PRN        VITALS/PHYSICAL EXAM  --------------------------------------------------------------------------------  T(C): 36.4 (09-03-20 @ 08:00), Max: 36.6 (09-02-20 @ 20:00)  HR: 103 (09-03-20 @ 12:00) (99 - 115)  BP: 128/90 (09-03-20 @ 11:00) (109/80 - 149/98)  RR: 18 (09-03-20 @ 12:00) (12 - 43)  SpO2: 100% (09-03-20 @ 12:00) (95% - 100%)  Wt(kg): --        09-02-20 @ 07:01  -  09-03-20 @ 07:00  --------------------------------------------------------  IN: 1719.3 mL / OUT: 3500 mL / NET: -1780.7 mL    09-03-20 @ 07:01  -  09-03-20 @ 12:16  --------------------------------------------------------  IN: 348.4 mL / OUT: 195 mL / NET: 153.4 mL    PHYSICAL EXAM:  GEN:  guarded but stable, WD/WN/WG, (+)NGT  HEENT: NC/AT, PERRL, mucosa moist & throat clear, no trachea midline w/ neck supple  GI: (-) BS, firm, distended/ nontender (+)tympanitic   MSK:  FROM x4, no deformities  VASCULAR: Equally warm, no cyanosis, clubbing,          BLE edema equal,  R>LUE edema- soft nontender w/o cord or erythema  Neurology; no focal deficits, weakened strength & sensation grossly intact  Psych: normal affect  Skin: warm,  moist, & w/ good turgor        LABS/ CULTURES/ RADIOLOGY:              8.4    17.45 >-----------<  170      [09-03-20 @ 05:49]              25.8     144  |  112  |  93  ----------------------------<  183      [09-03-20 @ 10:18]  3.8   |  22  |  1.72        Ca     8.2     [09-03-20 @ 10:18]      Mg     2.5     [09-03-20 @ 10:18]      Phos  3.4     [09-03-20 @ 10:18]    TPro  8.0  /  Alb  1.9  /  TBili  1.0  /  DBili  0.4  /  AST  51  /  ALT  24  /  AlkPhos  92  [09-03-20 @ 10:18]    CAPILLARY BLOOD GLUCOSE  POCT Blood Glucose.: 189 mg/dL (03 Sep 2020 05:34)  POCT Blood Glucose.: 517 mg/dL (03 Sep 2020 05:32)  POCT Blood Glucose.: 168 mg/dL (02 Sep 2020 21:43)  POCT Blood Glucose.: 125 mg/dL (02 Sep 2020 13:31)    Prealbumin, Serum: 10 mg/dL (09-02-20 @ 08:12)  Prealbumin, Serum: 24 mg/dL (08-25-20 @ 15:10)      Triglycerides, Serum: 116 mg/dL (09-02-20 @ 01:36)

## 2020-09-03 NOTE — PROGRESS NOTE ADULT - SUBJECTIVE AND OBJECTIVE BOX
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 Patient was treated w/ prednisone and Rituximab. Tacro was discontinued and patient was also transitioned to everolimus  Admitted  6/16-6/19  for hyperglycemia.  Reported to transplant team having one done black tarry stool-  instructed to  present to [facility] for hemolytic anemia. Patient has been following with Hematology outpatient.  Denies CP  N/V diarrhea SOB. (11 Aug 2020 20:08)      Surgery/Hospital Course:  8/11 Admitted to [facility] with symptomatic anemia  8/13 EGD for investigation of tarry stools  8/15 SB capsule: stomach & SB lesions, likely ulcers.  8/17 EGD/push enteroscopy w/ angioectasias/erosions in small bowel. Gastropathy and esophagitis. Ulcer seen on VCE most likely scope trauma.    8/18 VSS transferred back to floor.   8/20 VS 9.0/28.4 stable Gastric biopsy results LA Grade A esophagitis.  - Acute gastritis. Biopsies taken to r/o CMV, No ulceration seen despite finding on capsule endoscopy - likely 2/2 scope  trauma that has since resolved. Pathology pending.  8/21 VSS H/H  8.1/25.7  trending down will monitor prednisone taper 30 mg today. Procardia 120 initiated today RHC biopsy Monday.   8/22 VVS; Patient reports loose stools x 2-3 days with 1 episode today.  Stool sent for C-dif and GI PCR. Abdominal X-ray revealed: dilated loops of bowel. Abdomen distended.  Patient denies N/V. GI called to re-evaluate. Continue with current medication regimen.  Awaiting for upcoming cardiac biopsy on Monday 8/24.  8/23   vss    creat up to 2.28 ,  repeating CMP,  TTE ordered  Bladder scan   8/24   cardiac bx cancelled   elev creat  to 2.74   cardio renal cslt called,    + CDIF   inc vanco to 500 q6   ID following  8/25 VSS: RHC today as per transplant team; transfuse 2 units PRBC today as per Dr. Viramontes; continue vanco for cdiff; transfer patient to CTU after cath today   8/26. Dieretic challenge with good response   8/27: Downgraded to the floor then upgraded to the CTU again for concerning of abd distention   8/28 CT ABD & Pelvis reveals pancolitis  8/30: repeat CT scan of abd, gen surgery called to re-evaluated pt.      Today:      ICU Vital Signs Last 24 Hrs  T(C): 36.4 (03 Sep 2020 08:00), Max: 36.6 (02 Sep 2020 11:00)  T(F): 97.5 (03 Sep 2020 08:00), Max: 97.9 (02 Sep 2020 11:00)  HR: 106 (03 Sep 2020 08:00) (99 - 115)  BP: 123/84 (03 Sep 2020 08:00) (109/80 - 149/98)  BP(mean): 98 (03 Sep 2020 08:00) (92 - 116)  ABP: --  ABP(mean): --  RR: 17 (03 Sep 2020 08:00) (12 - 43)  SpO2: 100% (03 Sep 2020 08:00) (95% - 100%)      Physical Exam:  Gen:  Awake, alert   CNS: non focal 	  Neck: no JVD  RES : clear , no wheezing              CVS: Sinus Tachycardia. Normal S1/S2  Abd: firm, non-tender, distended. Bowel sounds absent. +NGT to LWS  Skin: No rash.  Ext:  +1 pedal edema    ============================I/O===========================   I&O's Detail    02 Sep 2020 07:01  -  03 Sep 2020 07:00  --------------------------------------------------------  IN:    Enteral Tube Flush: 30 mL    fat emulsion (Fish Oil and Plant Based) 20% Infusion: 816 mL    Solution: 300 mL    Solution: 48.3 mL    Solution: 525 mL  Total IN: 1719.3 mL    OUT:    Indwelling Catheter - Urethral: 2850 mL    Nasoenteral Tube: 650 mL  Total OUT: 3500 mL    Total NET: -1780.7 mL      03 Sep 2020 07:01  -  03 Sep 2020 10:03  --------------------------------------------------------  IN:    fat emulsion (Fish Oil and Plant Based) 20% Infusion: 68 mL    Solution: 2.1 mL  Total IN: 70.1 mL    OUT:    Indwelling Catheter - Urethral: 60 mL  Total OUT: 60 mL    Total NET: 10.1 mL        ============================ LABS =========================                        8.4    17.45 )-----------( 170      ( 03 Sep 2020 05:49 )             25.8     09-03    140  |  108  |  87<H>  ----------------------------<  549<HH>  5.6<H>   |  20<L>  |  1.71<H>    Ca    8.3<L>      03 Sep 2020 05:49  Phos  6.5     09-03  Mg     2.7     09-03    TPro  7.4  /  Alb  1.5<L>  /  TBili  0.9  /  DBili  x   /  AST  55<H>  /  ALT  34  /  AlkPhos  83  09-03    LIVER FUNCTIONS - ( 03 Sep 2020 05:49 )  Alb: 1.5 g/dL / Pro: 7.4 g/dL / ALK PHOS: 83 U/L / ALT: 34 U/L / AST: 55 U/L / GGT: x                 ======================Micro/Rad/Cardio=================  Culture: Reviewed   CXR: Reviewed  Echo:Reviewed  ======================================================  PAST MEDICAL & SURGICAL HISTORY:  H/O hemolytic anemia  H/O autoimmune hemolytic anemia  Knee pain, right  HLD (hyperlipidemia)  Former smoker  DVT of upper extremity (deep vein thrombosis)  Hepatitis C virus  GIB (gastrointestinal bleeding)  Ventricular fibrillation: s/p AICD  PAF (paroxysmal atrial fibrillation): on xarelto  Non-Ischemic Cardiomyopathy  SVT (Supraventricular Tachycardia)  HTN  CHF (Congestive Heart Failure)  S/P right heart catheterization: biopsy multiple  H/O heart transplant: 2/2018  Status post left hip replacement  History of Prior Ablation Treatment: for afib  AICD (Automatic Cardioverter/Defibrillator) Present: [employer] with 1 [employer] lead4/1/09- explanted and replaced with Medtronic 2 leads on 9/2/09    ====================ASSESSMENT ==============  Heart transplant 2/2018 for ACC/AHA stage D NICM   Resolved GI Bleed, Melena s/p EGD  Acute respiratory failure, resolved  Supraventricular tachycardia  Severe hypertension  Hyperlactatemia acidosis, resolved  Leukopenia- resolved  Acute blood loss anemia, stable   CKD Stage III  C. diff diarrhea  Klebsiella oxytoca bacteremia  Sepsis  Azotemia 2/2 CKD and Steroids   Pancolitis    Plan:  ====================== NEUROLOGY=====================  Nonfocal, continue to monitor neuro status   Continue PT, out of bed to chair as tolerated    ==================== RESPIRATORY======================  On supplemental O2 therapy via 2L NC  Encourage incentive spirometry, continue pulse ox monitoring, follow ABGs     ====================CARDIOVASCULAR==================  Heart transplant 2/2018 for ACC/AHA stage D NICM, now on solumedrol from prednisone   (NPO for abd distention) off everolimus. Off cellcept while on high dose steroids   Continue hemodynamic monitoring   ASA on hold for hx of GI bleed / ulceration     methylPREDNISolone sodium succinate Injectable 16 milliGRAM(s) IV Push <User Schedule>    ===================HEMATOLOGIC/ONC ===================  Anemia s/p multiple blood transfusions  Monitor H&H and transfuse prn   GI Bleeding ruled out s/p EGD    VTE prophylaxis, heparin   Injectable 5000 Unit(s) SubCutaneous every 8 hours    ===================== RENAL =========================  NORMAN on CKD stage 3, Anasarca. Lasix PRN  Continue to monitor I/Os, BUN/Cr, and urine output.   Monitor and replete electrolytes prn     ==================== GASTROINTESTINAL===================  NPO in setting of abdominal distention   CT A/P on 8/30 with Pancolitis and small volume abdominopelvic ascites, not significantly changed since 8/20/2020.  General surgery following-continue serial abdominal exams.     fat emulsion (Fish Oil and Plant Based) 20% Infusion 10.4 mL/Hr (10.4 mL/Hr) IV Continuous <Continuous>  GI prophylaxis, pantoprazole  Injectable 40 milliGRAM(s) IV Push two times a day  Parenteral Nutrition - Adult 1 Each (58 mL/Hr) TPN Continuous <Continuous>    =======================    ENDOCRINE  =====================  Glucose control with Humalog sliding scale for stress hyperglycemia     insulin lispro (HumaLOG) corrective regimen sliding scale   SubCutaneous every 8 hours    ========================INFECTIOUS DISEASE================  Klebsiella bacteremia from 8/13 which has since cleared  Clostridium difficile PCR positive, continue with vancomycin PO   Started on Rectal Vanco for worsening Cdiff presentation as per ID    Transplant prophylaxis with Posaconazole and Atovaquone on hold d/t ileus    vancomycin    Solution 500 milliGRAM(s) Oral every 6 hours  vancomycin  Retention Enema 500 milliGRAM(s) Rectal every 6 hours      Patient requires continuous monitoring with bedside rhythm monitoring, pulse ox monitoring, and intermittent blood gas analysis. Care plan discussed with ICU care team. Patient remained critical and at risk for life threatening decompensation.     By signing my name below, IRonit, attest that this documentation has been prepared under the direction and in the presence of SHELLY Goode   Electronically signed: Katty Gaffney, 09-03-20 @ 10:03    ILi, personally performed the services described in this documentation. all medical record entries made by the ramuibkurt were at my direction and in my presence. 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 Patient was treated w/ prednisone and Rituximab. Tacro was discontinued and patient was also transitioned to everolimus  Admitted  6/16-6/19  for hyperglycemia.  Reported to transplant team having one done black tarry stool-  instructed to  present to [facility] for hemolytic anemia. Patient has been following with Hematology outpatient.  Denies CP  N/V diarrhea SOB. (11 Aug 2020 20:08)      Surgery/Hospital Course:  8/11 Admitted to [facility] with symptomatic anemia  8/13 EGD for investigation of tarry stools  8/15 SB capsule: stomach & SB lesions, likely ulcers.  8/17 EGD/push enteroscopy w/ angioectasias/erosions in small bowel. Gastropathy and esophagitis. Ulcer seen on VCE most likely scope trauma.    8/18 VSS transferred back to floor.   8/20 VS 9.0/28.4 stable Gastric biopsy results LA Grade A esophagitis.  - Acute gastritis. Biopsies taken to r/o CMV, No ulceration seen despite finding on capsule endoscopy - likely 2/2 scope  trauma that has since resolved. Pathology pending.  8/21 VSS H/H  8.1/25.7  trending down will monitor prednisone taper 30 mg today. Procardia 120 initiated today RHC biopsy Monday.   8/22 VVS; Patient reports loose stools x 2-3 days with 1 episode today.  Stool sent for C-dif and GI PCR. Abdominal X-ray revealed: dilated loops of bowel. Abdomen distended.  Patient denies N/V. GI called to re-evaluate. Continue with current medication regimen.  Awaiting for upcoming cardiac biopsy on Monday 8/24.  8/23   vss    creat up to 2.28 ,  repeating CMP,  TTE ordered  Bladder scan   8/24   cardiac bx cancelled   elev creat  to 2.74   cardio renal cslt called,    + CDIF   inc vanco to 500 q6   ID following  8/25 VSS: RHC today as per transplant team; transfuse 2 units PRBC today as per Dr. Viramontes; continue vanco for cdiff; transfer patient to CTU after cath today   8/26. Dieretic challenge with good response   8/27: Downgraded to the floor then upgraded to the CTU again for concerning of abd distention   8/28 CT ABD & Pelvis reveals pancolitis  8/30: repeat CT scan of abd, gen surgery called to re-evaluated pt.      Today:    REVIEW OF SYSTEMS:  Gen: No fever  EYES/ENT: No visual changes;  No vertigo or throat pain   NECK: No pain   RES:  No shortness of breath or Cough  CARD: No chest pain   GI: No abdominal pain  : No dysuria  NEURO: No weakness  SKIN: No itching, rashes     ICU Vital Signs Last 24 Hrs  T(C): 36.4 (03 Sep 2020 08:00), Max: 36.6 (02 Sep 2020 11:00)  T(F): 97.5 (03 Sep 2020 08:00), Max: 97.9 (02 Sep 2020 11:00)  HR: 106 (03 Sep 2020 08:00) (99 - 115)  BP: 123/84 (03 Sep 2020 08:00) (109/80 - 149/98)  BP(mean): 98 (03 Sep 2020 08:00) (92 - 116)  ABP: --  ABP(mean): --  RR: 17 (03 Sep 2020 08:00) (12 - 43)  SpO2: 100% (03 Sep 2020 08:00) (95% - 100%)    Physical Exam:  Gen:  Awake, alert   CNS: non focal 	  Neck: no JVD  RES : clear , no wheezing              CVS: Sinus Tachycardia. Normal S1/S2  Abd: firm, non-tender, distended. Bowel sounds absent. +NGT to LWS  Skin: No rash.  Ext:  +1 pedal edema    ============================I/O===========================   I&O's Detail    02 Sep 2020 07:01  -  03 Sep 2020 07:00  --------------------------------------------------------  IN:    Enteral Tube Flush: 30 mL    fat emulsion (Fish Oil and Plant Based) 20% Infusion: 816 mL    Solution: 300 mL    Solution: 48.3 mL    Solution: 525 mL  Total IN: 1719.3 mL    OUT:    Indwelling Catheter - Urethral: 2850 mL    Nasoenteral Tube: 650 mL  Total OUT: 3500 mL    Total NET: -1780.7 mL      03 Sep 2020 07:01  -  03 Sep 2020 10:03  --------------------------------------------------------  IN:    fat emulsion (Fish Oil and Plant Based) 20% Infusion: 68 mL    Solution: 2.1 mL  Total IN: 70.1 mL    OUT:    Indwelling Catheter - Urethral: 60 mL  Total OUT: 60 mL    Total NET: 10.1 mL        ============================ LABS =========================                        8.4    17.45 )-----------( 170      ( 03 Sep 2020 05:49 )             25.8     09-03    140  |  108  |  87<H>  ----------------------------<  549<HH>  5.6<H>   |  20<L>  |  1.71<H>    Ca    8.3<L>      03 Sep 2020 05:49  Phos  6.5     09-03  Mg     2.7     09-03    TPro  7.4  /  Alb  1.5<L>  /  TBili  0.9  /  DBili  x   /  AST  55<H>  /  ALT  34  /  AlkPhos  83  09-03    LIVER FUNCTIONS - ( 03 Sep 2020 05:49 )  Alb: 1.5 g/dL / Pro: 7.4 g/dL / ALK PHOS: 83 U/L / ALT: 34 U/L / AST: 55 U/L / GGT: x                 ======================Micro/Rad/Cardio=================  Culture: Reviewed   CXR: Reviewed  Echo:Reviewed  ======================================================  PAST MEDICAL & SURGICAL HISTORY:  H/O hemolytic anemia  H/O autoimmune hemolytic anemia  Knee pain, right  HLD (hyperlipidemia)  Former smoker  DVT of upper extremity (deep vein thrombosis)  Hepatitis C virus  GIB (gastrointestinal bleeding)  Ventricular fibrillation: s/p AICD  PAF (paroxysmal atrial fibrillation): on xarelto  Non-Ischemic Cardiomyopathy  SVT (Supraventricular Tachycardia)  HTN  CHF (Congestive Heart Failure)  S/P right heart catheterization: biopsy multiple  H/O heart transplant: 2/2018  Status post left hip replacement  History of Prior Ablation Treatment: for afib  AICD (Automatic Cardioverter/Defibrillator) Present: [employer] with 1 [employer] [place] lead4/1/09- explanted and replaced with Medtronic 2 leads on 9/2/09    ====================ASSESSMENT ==============  Heart transplant 2/2018 for ACC/AHA stage D NICM   Resolved GI Bleed, Melena s/p EGD  Acute respiratory failure, resolved  Supraventricular tachycardia  Severe hypertension  Hyperlactatemia acidosis, resolved  Leukopenia- resolved  Acute blood loss anemia, stable   CKD Stage III  C. diff diarrhea  Klebsiella oxytoca bacteremia  Sepsis  Azotemia 2/2 CKD and Steroids   Pancolitis    Plan:  ====================== NEUROLOGY=====================  Nonfocal, continue to monitor neuro status   Continue PT, out of bed to chair as tolerated    ==================== RESPIRATORY======================  On supplemental O2 therapy via 2L NC  Encourage incentive spirometry, continue pulse ox monitoring, follow ABGs     ====================CARDIOVASCULAR==================  Heart transplant 2/2018 for ACC/AHA stage D NICM, now on solumedrol from prednisone   (NPO for abd distention) off everolimus. Off cellcept while on high dose steroids   Continue hemodynamic monitoring   ASA on hold for hx of GI bleed / ulceration     methylPREDNISolone sodium succinate Injectable 16 milliGRAM(s) IV Push <User Schedule>    ===================HEMATOLOGIC/ONC ===================  Anemia s/p multiple blood transfusions  Monitor H&H and transfuse prn   GI Bleeding ruled out s/p EGD    VTE prophylaxis, heparin   Injectable 5000 Unit(s) SubCutaneous every 8 hours    ===================== RENAL =========================  NORMAN on CKD stage 3, Anasarca. Lasix PRN  Continue to monitor I/Os, BUN/Cr, and urine output.   Monitor and replete electrolytes prn     ==================== GASTROINTESTINAL===================  NPO in setting of abdominal distention   CT A/P on 8/30 with Pancolitis and small volume abdominopelvic ascites, not significantly changed since 8/20/2020.  General surgery following-continue serial abdominal exams.     fat emulsion (Fish Oil and Plant Based) 20% Infusion 10.4 mL/Hr (10.4 mL/Hr) IV Continuous <Continuous>  GI prophylaxis, pantoprazole  Injectable 40 milliGRAM(s) IV Push two times a day  Parenteral Nutrition - Adult 1 Each (58 mL/Hr) TPN Continuous <Continuous>    =======================    ENDOCRINE  =====================  Glucose control with Humalog sliding scale for stress hyperglycemia     insulin lispro (HumaLOG) corrective regimen sliding scale   SubCutaneous every 8 hours    ========================INFECTIOUS DISEASE================  Klebsiella bacteremia from 8/13 which has since cleared  Clostridium difficile PCR positive, continue with vancomycin PO   Started on Rectal Vanco for worsening Cdiff presentation as per ID    Transplant prophylaxis with Posaconazole and Atovaquone on hold d/t ileus    vancomycin    Solution 500 milliGRAM(s) Oral every 6 hours  vancomycin  Retention Enema 500 milliGRAM(s) Rectal every 6 hours      Patient requires continuous monitoring with bedside rhythm monitoring, pulse ox monitoring, and intermittent blood gas analysis. Care plan discussed with ICU care team. Patient remained critical and at risk for life threatening decompensation.     By signing my name below, I, Ronit Joe, attest that this documentation has been prepared under the direction and in the presence of SHELLY Goode   Electronically signed: Ab Gaffney, 09-03-20 @ 10:03    ILi, personally performed the services described in this documentation. all medical record entries made by the ab were at my direction and in my presence. 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 Patient was treated w/ prednisone and Rituximab. Tacro was discontinued and patient was also transitioned to everolimus  Admitted  6/16-6/19  for hyperglycemia.  Reported to transplant team having one done black tarry stool-  instructed to  present to [facility] for hemolytic anemia. Patient has been following with Hematology outpatient.  Denies CP  N/V diarrhea SOB. (11 Aug 2020 20:08)      Surgery/Hospital Course:  8/11 Admitted to [facility] with symptomatic anemia  8/13 EGD for investigation of tarry stools  8/15 SB capsule: stomach & SB lesions, likely ulcers.  8/17 EGD/push enteroscopy w/ angioectasias/erosions in small bowel. Gastropathy and esophagitis. Ulcer seen on VCE most likely scope trauma.    8/18 VSS transferred back to floor.   8/20 VS 9.0/28.4 stable Gastric biopsy results LA Grade A esophagitis.  - Acute gastritis. Biopsies taken to r/o CMV, No ulceration seen despite finding on capsule endoscopy - likely 2/2 scope  trauma that has since resolved. Pathology pending.  8/21 VSS H/H  8.1/25.7  trending down will monitor prednisone taper 30 mg today. Procardia 120 initiated today RHC biopsy Monday.   8/22 VVS; Patient reports loose stools x 2-3 days with 1 episode today.  Stool sent for C-dif and GI PCR. Abdominal X-ray revealed: dilated loops of bowel. Abdomen distended.  Patient denies N/V. GI called to re-evaluate. Continue with current medication regimen.  Awaiting for upcoming cardiac biopsy on Monday 8/24.  8/23   vss    creat up to 2.28 ,  repeating CMP,  TTE ordered  Bladder scan   8/24   cardiac bx cancelled   elev creat  to 2.74   cardio renal cslt called,    + CDIF   inc vanco to 500 q6   ID following  8/25 VSS: RHC today as per transplant team; transfuse 2 units PRBC today as per Dr. Viramontes; continue vanco for cdiff; transfer patient to CTU after cath today   8/26. Dieretic challenge with good response   8/27: Downgraded to the floor then upgraded to the CTU again for concerning of abd distention   8/28 CT ABD & Pelvis reveals pancolitis  8/30: repeat CT scan of abd, gen surgery called to re-evaluated pt.  8/31: RUE duplex negative for DVT      Today: Access issues. Central line to be placed. Blood Cx to be sent for elevated WBC and old central line removed earlier in the AM     REVIEW OF SYSTEMS:  Gen: No fever  EYES/ENT: No visual changes;  No vertigo or throat pain   NECK: No pain   RES:  No shortness of breath or Cough  CARD: No chest pain   GI: No abdominal pain  : No dysuria  NEURO: No weakness  SKIN: No itching, rashes     ICU Vital Signs Last 24 Hrs  T(C): 36.4 (03 Sep 2020 08:00), Max: 36.6 (02 Sep 2020 11:00)  T(F): 97.5 (03 Sep 2020 08:00), Max: 97.9 (02 Sep 2020 11:00)  HR: 106 (03 Sep 2020 08:00) (99 - 115)  BP: 123/84 (03 Sep 2020 08:00) (109/80 - 149/98)  BP(mean): 98 (03 Sep 2020 08:00) (92 - 116)  ABP: --  ABP(mean): --  RR: 17 (03 Sep 2020 08:00) (12 - 43)  SpO2: 100% (03 Sep 2020 08:00) (95% - 100%)    Physical Exam:  Gen:  Awake, alert   CNS: non focal 	  Neck: no JVD  RES : clear , no wheezing              CVS: Sinus Tachycardia. Normal S1/S2  Abd: firm, non-tender, distended. Bowel sounds absent. +NGT to LWS  Skin: No rash.  Ext:  +1 pedal edema    ============================I/O===========================   I&O's Detail    02 Sep 2020 07:01  -  03 Sep 2020 07:00  --------------------------------------------------------  IN:    Enteral Tube Flush: 30 mL    fat emulsion (Fish Oil and Plant Based) 20% Infusion: 816 mL    Solution: 300 mL    Solution: 48.3 mL    Solution: 525 mL  Total IN: 1719.3 mL    OUT:    Indwelling Catheter - Urethral: 2850 mL    Nasoenteral Tube: 650 mL  Total OUT: 3500 mL    Total NET: -1780.7 mL      03 Sep 2020 07:01  -  03 Sep 2020 10:03  --------------------------------------------------------  IN:    fat emulsion (Fish Oil and Plant Based) 20% Infusion: 68 mL    Solution: 2.1 mL  Total IN: 70.1 mL    OUT:    Indwelling Catheter - Urethral: 60 mL  Total OUT: 60 mL    Total NET: 10.1 mL        ============================ LABS =========================                        8.4    17.45 )-----------( 170      ( 03 Sep 2020 05:49 )             25.8     09-03    140  |  108  |  87<H>  ----------------------------<  549<HH>  5.6<H>   |  20<L>  |  1.71<H>    Ca    8.3<L>      03 Sep 2020 05:49  Phos  6.5     09-03  Mg     2.7     09-03    TPro  7.4  /  Alb  1.5<L>  /  TBili  0.9  /  DBili  x   /  AST  55<H>  /  ALT  34  /  AlkPhos  83  09-03    LIVER FUNCTIONS - ( 03 Sep 2020 05:49 )  Alb: 1.5 g/dL / Pro: 7.4 g/dL / ALK PHOS: 83 U/L / ALT: 34 U/L / AST: 55 U/L / GGT: x                 ======================Micro/Rad/Cardio=================  Culture: Reviewed   CXR: Reviewed  Echo:Reviewed  ======================================================  PAST MEDICAL & SURGICAL HISTORY:  H/O hemolytic anemia  H/O autoimmune hemolytic anemia  Knee pain, right  HLD (hyperlipidemia)  Former smoker  DVT of upper extremity (deep vein thrombosis)  Hepatitis C virus  GIB (gastrointestinal bleeding)  Ventricular fibrillation: s/p AICD  PAF (paroxysmal atrial fibrillation): on xarelto  Non-Ischemic Cardiomyopathy  SVT (Supraventricular Tachycardia)  HTN  CHF (Congestive Heart Failure)  S/P right heart catheterization: biopsy multiple  H/O heart transplant: 2/2018  Status post left hip replacement  History of Prior Ablation Treatment: for afib  AICD (Automatic Cardioverter/Defibrillator) Present: [employer] with 1 [employer] lead4/1/09- explanted and replaced with [employer] 2 leads on 9/2/09    ====================ASSESSMENT ==============  Heart transplant 2/2018 for ACC/AHA stage D NICM   Resolved GI Bleed, Melena s/p EGD  Acute respiratory failure, resolved  Supraventricular tachycardia  Severe hypertension  Hyperlactatemia acidosis, resolved  Leukopenia- resolved  Acute blood loss anemia, stable   CKD Stage III  C. diff diarrhea  Klebsiella oxytoca bacteremia  Sepsis  Azotemia 2/2 CKD and Steroids   Pancolitis    Plan:  ====================== NEUROLOGY=====================  Nonfocal, continue to monitor neuro status   Continue PT, out of bed to chair as tolerated    ==================== RESPIRATORY======================  On supplemental O2 therapy via 2L NC, wean off as tolerated   Encourage incentive spirometry, continue pulse ox monitoring, follow ABGs     ====================CARDIOVASCULAR==================  Heart transplant 2/2018 for ACC/AHA stage D NICM, now on solumedrol from prednisone   (NPO for abd distention) off everolimus. Off cellcept while on high dose steroids   Continue hemodynamic monitoring   ASA on hold for hx of GI bleed / ulceration   ECHO to be done today per HF team     methylPREDNISolone sodium succinate Injectable 16 milliGRAM(s) IV Push <User Schedule>    ===================HEMATOLOGIC/ONC ===================  Anemia s/p multiple blood transfusions  Monitor H&H and transfuse prn   GI Bleeding ruled out s/p EGD, found to have ulcers   f/u heme recs for hx of autoimmune hemolytic anemia(ldh, hapto and Cogas ordered)     VTE prophylaxis, heparin   Injectable 5000 Unit(s) SubCutaneous every 8 hours    ===================== RENAL =========================  NORMAN on CKD stage 3, Anasarca. Lasix PRN  Continue to monitor I/Os, BUN/Cr, and urine output.   Monitor and replete electrolytes prn     ==================== GASTROINTESTINAL===================  NPO in setting of abdominal distention   CT A/P on 8/30 with Pancolitis and small volume abdominopelvic ascites, not significantly changed since 8/20/2020.  General surgery following-continue serial abdominal exams. No surgical intervention at this time     fat emulsion (Fish Oil and Plant Based) 20% Infusion 10.4 mL/Hr (10.4 mL/Hr) IV Continuous <Continuous>  GI prophylaxis, pantoprazole  Injectable 40 milliGRAM(s) IV Push two times a day  Parenteral Nutrition - Adult 1 Each (58 mL/Hr) TPN Continuous <Continuous>    =======================    ENDOCRINE  =====================  Glucose control with Humalog sliding scale for stress hyperglycemia     insulin lispro (HumaLOG) corrective regimen sliding scale   SubCutaneous every 8 hours    ========================INFECTIOUS DISEASE================  Klebsiella bacteremia from 8/13 which has since cleared  Clostridium difficile PCR positive, continue with vancomycin PO and Rectal Vanco for worsening  c/w eravacycline for CDiff   Transplant prophylaxis with Posaconazole and Atovaquone on hold do to ileus   Blood Cx to be sent from today for elevated WBC. Trend WBC   Monitor Fever         vancomycin    Solution 500 milliGRAM(s) Oral every 6 hours  vancomycin  Retention Enema 500 milliGRAM(s) Rectal every 6 hours      Patient requires continuous monitoring with bedside rhythm monitoring, pulse ox monitoring, and intermittent blood gas analysis. Care plan discussed with ICU care team. Patient remained critical and at risk for life threatening decompensation.     By signing my name below, I, Ronit Joe, attest that this documentation has been prepared under the direction and in the presence of SHELLY Goode   Electronically signed: Katty Gaffney, 09-03-20 @ 10:03    I, Li Ohara, personally performed the services described in this documentation. all medical record entries made by the ramuibkurt were at my direction and in my presence. I have reviewed the chart and agree that the record reflects my personal performance and is accurate and complete  Electronically signed: SHELLY Goode

## 2020-09-03 NOTE — PROGRESS NOTE ADULT - ASSESSMENT
Assessment:   70y M with history of heart transplant in 2/2018 admitted with autoimmune hemolytic anemia and dark stools requiring transfusion. Found to have c diff and abdominal distension on 8/23.    Plan:   - Serial abdominal exams, distension improving at this time and patient with fewer BM per day  - no acute surgical intervention indicated at this time, patient clinically improving  - agree with abx therapy  - f/u WBC this am, upward noted yesterday  - remainder of care per CTICU  - Will continue to follow    Angie Leigh, PGY-2  [facility]  [id number] Assessment:   70y M with history of heart transplant in 2/2018 admitted with autoimmune hemolytic anemia and dark stools requiring transfusion. Found to have c diff and abdominal distension on 8/23.    Plan:   - Serial abdominal exams, distension improving at this time and patient with fewer BM per day  - no acute surgical intervention indicated at this time, patient clinically improving  - agree with abx therapy  - f/u WBC this am, upward noted yesterday  - remainder of care per CTICU  - Condition resolving. Please call with further questions    Angie Leigh, PGY-2  [facility]  [phone]

## 2020-09-04 LAB
ALBUMIN SERPL ELPH-MCNC: 1.8 G/DL — LOW (ref 3.3–5)
ALP SERPL-CCNC: 91 U/L — SIGNIFICANT CHANGE UP (ref 40–120)
ALT FLD-CCNC: 24 U/L — SIGNIFICANT CHANGE UP (ref 10–45)
ANION GAP SERPL CALC-SCNC: 9 MMOL/L — SIGNIFICANT CHANGE UP (ref 5–17)
AST SERPL-CCNC: 59 U/L — HIGH (ref 10–40)
BASE EXCESS BLDV CALC-SCNC: 2.5 MMOL/L — HIGH (ref -2–2)
BASE EXCESS BLDV CALC-SCNC: 3 MMOL/L — HIGH (ref -2–2)
BILIRUB SERPL-MCNC: 1 MG/DL — SIGNIFICANT CHANGE UP (ref 0.2–1.2)
BUN SERPL-MCNC: 90 MG/DL — HIGH (ref 7–23)
CA-I SERPL-SCNC: 1.19 MMOL/L — SIGNIFICANT CHANGE UP (ref 1.12–1.3)
CALCIUM SERPL-MCNC: 8 MG/DL — LOW (ref 8.4–10.5)
CHLORIDE BLDV-SCNC: 120 MMOL/L — HIGH (ref 96–108)
CHLORIDE SERPL-SCNC: 115 MMOL/L — HIGH (ref 96–108)
CO2 BLDV-SCNC: 30 MMOL/L — SIGNIFICANT CHANGE UP (ref 22–30)
CO2 BLDV-SCNC: 31 MMOL/L — HIGH (ref 22–30)
CO2 SERPL-SCNC: 26 MMOL/L — SIGNIFICANT CHANGE UP (ref 22–31)
CREAT SERPL-MCNC: 1.61 MG/DL — HIGH (ref 0.5–1.3)
CYCLOSPORINE SER-MCNC: <30 NG/ML — LOW (ref 150–400)
GAS PNL BLDA: SIGNIFICANT CHANGE UP
GAS PNL BLDV: 150 MMOL/L — HIGH (ref 135–145)
GAS PNL BLDV: SIGNIFICANT CHANGE UP
GLUCOSE BLDC GLUCOMTR-MCNC: 118 MG/DL — HIGH (ref 70–99)
GLUCOSE BLDC GLUCOMTR-MCNC: 125 MG/DL — HIGH (ref 70–99)
GLUCOSE BLDC GLUCOMTR-MCNC: 125 MG/DL — HIGH (ref 70–99)
GLUCOSE BLDC GLUCOMTR-MCNC: 133 MG/DL — HIGH (ref 70–99)
GLUCOSE BLDC GLUCOMTR-MCNC: 135 MG/DL — HIGH (ref 70–99)
GLUCOSE BLDC GLUCOMTR-MCNC: 135 MG/DL — HIGH (ref 70–99)
GLUCOSE BLDC GLUCOMTR-MCNC: 142 MG/DL — HIGH (ref 70–99)
GLUCOSE BLDC GLUCOMTR-MCNC: 142 MG/DL — HIGH (ref 70–99)
GLUCOSE BLDC GLUCOMTR-MCNC: 151 MG/DL — HIGH (ref 70–99)
GLUCOSE BLDC GLUCOMTR-MCNC: 152 MG/DL — HIGH (ref 70–99)
GLUCOSE BLDC GLUCOMTR-MCNC: 152 MG/DL — HIGH (ref 70–99)
GLUCOSE BLDC GLUCOMTR-MCNC: 156 MG/DL — HIGH (ref 70–99)
GLUCOSE BLDC GLUCOMTR-MCNC: 171 MG/DL — HIGH (ref 70–99)
GLUCOSE BLDC GLUCOMTR-MCNC: 186 MG/DL — HIGH (ref 70–99)
GLUCOSE BLDC GLUCOMTR-MCNC: 191 MG/DL — HIGH (ref 70–99)
GLUCOSE BLDC GLUCOMTR-MCNC: 207 MG/DL — HIGH (ref 70–99)
GLUCOSE BLDC GLUCOMTR-MCNC: 227 MG/DL — HIGH (ref 70–99)
GLUCOSE BLDC GLUCOMTR-MCNC: 235 MG/DL — HIGH (ref 70–99)
GLUCOSE BLDC GLUCOMTR-MCNC: 598 MG/DL — CRITICAL HIGH (ref 70–99)
GLUCOSE BLDC GLUCOMTR-MCNC: 82 MG/DL — SIGNIFICANT CHANGE UP (ref 70–99)
GLUCOSE BLDC GLUCOMTR-MCNC: >600 MG/DL — CRITICAL HIGH (ref 70–99)
GLUCOSE BLDV-MCNC: 181 MG/DL — HIGH (ref 70–99)
GLUCOSE SERPL-MCNC: 214 MG/DL — HIGH (ref 70–99)
HAPTOGLOB SERPL-MCNC: 31 MG/DL — LOW (ref 34–200)
HCO3 BLDV-SCNC: 28 MMOL/L — SIGNIFICANT CHANGE UP (ref 21–29)
HCO3 BLDV-SCNC: 29 MMOL/L — SIGNIFICANT CHANGE UP (ref 21–29)
HCT VFR BLD CALC: 25.8 % — LOW (ref 39–50)
HCT VFR BLDA CALC: 24 % — LOW (ref 39–50)
HGB BLD CALC-MCNC: 7.9 G/DL — LOW (ref 13–17)
HGB BLD-MCNC: 8.3 G/DL — LOW (ref 13–17)
LACTATE BLDV-MCNC: 2 MMOL/L — SIGNIFICANT CHANGE UP (ref 0.7–2)
MAGNESIUM SERPL-MCNC: 2.2 MG/DL — SIGNIFICANT CHANGE UP (ref 1.6–2.6)
MCHC RBC-ENTMCNC: 31.2 PG — SIGNIFICANT CHANGE UP (ref 27–34)
MCHC RBC-ENTMCNC: 32.2 GM/DL — SIGNIFICANT CHANGE UP (ref 32–36)
MCV RBC AUTO: 97 FL — SIGNIFICANT CHANGE UP (ref 80–100)
NRBC # BLD: 4 /100 WBCS — HIGH (ref 0–0)
OTHER CELLS CSF MANUAL: 6 ML/DL — LOW (ref 18–23)
PCO2 BLDV: 54 MMHG — HIGH (ref 35–50)
PCO2 BLDV: 56 MMHG — HIGH (ref 35–50)
PH BLDV: 7.34 — LOW (ref 7.35–7.45)
PH BLDV: 7.34 — LOW (ref 7.35–7.45)
PLATELET # BLD AUTO: 171 K/UL — SIGNIFICANT CHANGE UP (ref 150–400)
PO2 BLDV: 31 MMHG — SIGNIFICANT CHANGE UP (ref 25–45)
PO2 BLDV: 32 MMHG — SIGNIFICANT CHANGE UP (ref 25–45)
POTASSIUM BLDV-SCNC: 3.5 MMOL/L — SIGNIFICANT CHANGE UP (ref 3.5–5.3)
POTASSIUM SERPL-MCNC: 3.8 MMOL/L — SIGNIFICANT CHANGE UP (ref 3.5–5.3)
POTASSIUM SERPL-SCNC: 3.8 MMOL/L — SIGNIFICANT CHANGE UP (ref 3.5–5.3)
PROT SERPL-MCNC: 7.3 G/DL — SIGNIFICANT CHANGE UP (ref 6–8.3)
RBC # BLD: 2.66 M/UL — LOW (ref 4.2–5.8)
RBC # FLD: 20.6 % — HIGH (ref 10.3–14.5)
SAO2 % BLDV: 51 % — LOW (ref 67–88)
SAO2 % BLDV: 55 % — LOW (ref 67–88)
SODIUM SERPL-SCNC: 150 MMOL/L — HIGH (ref 135–145)
TRIGL SERPL-MCNC: 526 MG/DL — HIGH (ref 10–149)
WBC # BLD: 21.16 K/UL — HIGH (ref 3.8–10.5)
WBC # FLD AUTO: 21.16 K/UL — HIGH (ref 3.8–10.5)

## 2020-09-04 PROCEDURE — 99233 SBSQ HOSP IP/OBS HIGH 50: CPT

## 2020-09-04 PROCEDURE — 71045 X-RAY EXAM CHEST 1 VIEW: CPT | Mod: 26

## 2020-09-04 PROCEDURE — 74018 RADEX ABDOMEN 1 VIEW: CPT | Mod: 26

## 2020-09-04 PROCEDURE — 99291 CRITICAL CARE FIRST HOUR: CPT

## 2020-09-04 PROCEDURE — 99232 SBSQ HOSP IP/OBS MODERATE 35: CPT | Mod: GC

## 2020-09-04 PROCEDURE — 93306 TTE W/DOPPLER COMPLETE: CPT | Mod: 26

## 2020-09-04 RX ORDER — HYDRALAZINE HCL 50 MG
5 TABLET ORAL ONCE
Refills: 0 | Status: COMPLETED | OUTPATIENT
Start: 2020-09-04 | End: 2020-09-04

## 2020-09-04 RX ORDER — ELECTROLYTE SOLUTION,INJ
1 VIAL (ML) INTRAVENOUS
Refills: 0 | Status: DISCONTINUED | OUTPATIENT
Start: 2020-09-04 | End: 2020-09-04

## 2020-09-04 RX ORDER — INSULIN HUMAN 100 [IU]/ML
1 INJECTION, SOLUTION SUBCUTANEOUS
Qty: 100 | Refills: 0 | Status: DISCONTINUED | OUTPATIENT
Start: 2020-09-04 | End: 2020-09-13

## 2020-09-04 RX ORDER — DEXTROSE 50 % IN WATER 50 %
25 SYRINGE (ML) INTRAVENOUS
Refills: 0 | Status: DISCONTINUED | OUTPATIENT
Start: 2020-09-04 | End: 2020-09-04

## 2020-09-04 RX ORDER — ACETAMINOPHEN 500 MG
1000 TABLET ORAL ONCE
Refills: 0 | Status: COMPLETED | OUTPATIENT
Start: 2020-09-04 | End: 2020-09-04

## 2020-09-04 RX ORDER — DEXTROSE 50 % IN WATER 50 %
50 SYRINGE (ML) INTRAVENOUS
Refills: 0 | Status: DISCONTINUED | OUTPATIENT
Start: 2020-09-04 | End: 2020-09-04

## 2020-09-04 RX ORDER — HYDRALAZINE HCL 50 MG
10 TABLET ORAL ONCE
Refills: 0 | Status: COMPLETED | OUTPATIENT
Start: 2020-09-04 | End: 2020-09-04

## 2020-09-04 RX ORDER — DEXMEDETOMIDINE HYDROCHLORIDE IN 0.9% SODIUM CHLORIDE 4 UG/ML
0.25 INJECTION INTRAVENOUS
Qty: 200 | Refills: 0 | Status: DISCONTINUED | OUTPATIENT
Start: 2020-09-04 | End: 2020-09-08

## 2020-09-04 RX ADMIN — HEPARIN SODIUM 5000 UNIT(S): 5000 INJECTION INTRAVENOUS; SUBCUTANEOUS at 05:14

## 2020-09-04 RX ADMIN — Medication 10 MILLIGRAM(S): at 15:01

## 2020-09-04 RX ADMIN — CYCLOSPORINE 2.09 MILLIGRAM(S): 100 CAPSULE ORAL at 16:18

## 2020-09-04 RX ADMIN — Medication 1 EACH: at 07:15

## 2020-09-04 RX ADMIN — Medication 16 MILLIGRAM(S): at 08:21

## 2020-09-04 RX ADMIN — DEXMEDETOMIDINE HYDROCHLORIDE IN 0.9% SODIUM CHLORIDE 4.7 MICROGRAM(S)/KG/HR: 4 INJECTION INTRAVENOUS at 16:00

## 2020-09-04 RX ADMIN — INSULIN HUMAN 1 UNIT(S)/HR: 100 INJECTION, SOLUTION SUBCUTANEOUS at 07:15

## 2020-09-04 RX ADMIN — HEPARIN SODIUM 5000 UNIT(S): 5000 INJECTION INTRAVENOUS; SUBCUTANEOUS at 13:49

## 2020-09-04 RX ADMIN — Medication 1000 MILLIGRAM(S): at 06:00

## 2020-09-04 RX ADMIN — Medication 500 MILLIGRAM(S): at 12:38

## 2020-09-04 RX ADMIN — Medication 500 MILLIGRAM(S): at 00:00

## 2020-09-04 RX ADMIN — Medication 4: at 00:24

## 2020-09-04 RX ADMIN — Medication 500 MILLIGRAM(S): at 08:21

## 2020-09-04 RX ADMIN — Medication 500 MILLIGRAM(S): at 18:17

## 2020-09-04 RX ADMIN — PANTOPRAZOLE SODIUM 40 MILLIGRAM(S): 20 TABLET, DELAYED RELEASE ORAL at 05:14

## 2020-09-04 RX ADMIN — CHLORHEXIDINE GLUCONATE 1 APPLICATION(S): 213 SOLUTION TOPICAL at 05:16

## 2020-09-04 RX ADMIN — Medication 500 MILLIGRAM(S): at 13:49

## 2020-09-04 RX ADMIN — PANTOPRAZOLE SODIUM 40 MILLIGRAM(S): 20 TABLET, DELAYED RELEASE ORAL at 18:17

## 2020-09-04 RX ADMIN — INSULIN HUMAN 1 UNIT(S)/HR: 100 INJECTION, SOLUTION SUBCUTANEOUS at 02:05

## 2020-09-04 RX ADMIN — Medication 500 MILLIGRAM(S): at 01:43

## 2020-09-04 RX ADMIN — Medication 1 EACH: at 17:18

## 2020-09-04 RX ADMIN — HEPARIN SODIUM 5000 UNIT(S): 5000 INJECTION INTRAVENOUS; SUBCUTANEOUS at 21:15

## 2020-09-04 RX ADMIN — Medication 500 MILLIGRAM(S): at 20:20

## 2020-09-04 RX ADMIN — Medication 400 MILLIGRAM(S): at 05:44

## 2020-09-04 RX ADMIN — Medication 5 MILLIGRAM(S): at 15:39

## 2020-09-04 RX ADMIN — Medication 500 MILLIGRAM(S): at 05:15

## 2020-09-04 NOTE — PROGRESS NOTE ADULT - PROBLEM SELECTOR PLAN 1
Will continue medications per ID guidance. Fecal transplant not currently an option. We will discuss broadening of antibiotics given worsening leukocytosis.

## 2020-09-04 NOTE — PROGRESS NOTE ADULT - ASSESSMENT
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 s/p EGD with esophagitis and gastritis. Developed Klebsiella oxytoca bacteremia requiring transfer to CTU. Clinically improving, transferred to [facility], finished 10-day of ceftriaxone, however, developed severe C.diff colitis on Vancomycin 500mg q6h PO and IV Flagyl. He had RHC on 8/25 and found to have high filling pressure so transferred to CTU again.    #severe C.diff colitis with NORMAN- C.diff PCR detected (8/23). Repeat CT on 8/30 without evidence of toxic megacolon. Endorses feeling better. Appears same as yesterday. 2 loose Bm/day  -  Increasing leukocytosis 12v --> 21. Suspect is immune system recovering from immunosuppression and now reacting to Cdiff infection  - cont vancomycin 500 mg per rectum every 6 hours  - cont PO Vanco 500 q 6h   - discontinued IV flagyl (8/24-9/1)  - cont ERAVAYCLINE 1 mg/kg (750 mg)  every 12 hours for total 7 days (8/31- 9/6)  - completed IVIG x5 days (8/29-9/1)  - not eligible for fecal transplant     #leukocytosis - 2/2 ongoing c diff vs other infection.  - f/u bl clx 9/3  - would start abx only for positive blood cultures as abx can worsen current cdiff infection     #Klebsiella oxytoca bacteremia: resolved   Meropenem (8/14-8/17)  Cefepime (8/17-20)  Ceftriaxone (8/20-)  - Growing in blood cultures 2/2 sets on 8/13  - Repeat blood cultures x2 sets 8/14 no growth final  - s/p ceftriaxone 8/14-24   - blood cultures 8/25 NG    #OI prophylaxis-  -has been receiving high dose steroids since 5/20  -On IV cyclosporine now  -CMV viral load(8/17, 8/26): neg  -Valcyte and Bactrim d/c'ed on 8/17 due to leukopenia  -Galactomann<0.5, Fungitell<31  -Per hemoc, will taper prednisone every 7 days by 10mg  -Will consider IV bactrim for PCP ppx when able to tolerated PO    #Anemia- r/o GI bleeding  s/p EGD  EGD 8/17: esophagitis, acute gastritis s/p biopsy, no ulceration  On PPI 40mg daily  - cont to monitor cbc      Cm Infante MD  Fellow, Infectious Diseases, PGY-4  Pager: [phone]  After 5pm/Weekends: Call [phone]

## 2020-09-04 NOTE — PROGRESS NOTE ADULT - SUBJECTIVE AND OBJECTIVE BOX
[facility] NUTRITION SUPPORT / TPN -- FOLLOW UP NOTE  --------------------------------------------------------------------------------    24 hour events/subjective:    (+)NGT  No n/v  No flatus  (+)liquid stool  no f/c/s  no dyspnea/ sob/ palp/ cp      Diet:  Diet, NPO:   Except Medications (08-27-20 @ 20:17)      Appetite: [x  ]Poor [  ]Adequate [  ]Good  Caloric intake:  [x   ]  Adequate   [   ] Inadequate    ROS: General/ GI see HPI  all other systems negative      ALLERGIES & MEDICATIONS  --------------------------------------------------------------------------------  No Known Allergies        STANDING INPATIENT MEDICATIONS    chlorhexidine 2% Cloths 1 Application(s) Topical <User Schedule>  cycloSPORINE  (SandIMMUNE) IVPB 10 milliGRAM(s) IV Intermittent every 24 hours  Eravacycline (Xerava) 75 milliGRAM(s),Sodium Chloride 0.9% 150 milliLiter(s) 75 milliGRAM(s) IV Intermittent every 12 hours  fat emulsion (Fish Oil and Plant Based) 20% Infusion 20.8 mL/Hr IV Continuous <Continuous>  heparin   Injectable 5000 Unit(s) SubCutaneous every 8 hours  insulin regular Infusion 1 Unit(s)/Hr IV Continuous <Continuous>  methylPREDNISolone sodium succinate Injectable 16 milliGRAM(s) IV Push <User Schedule>  pantoprazole  Injectable 40 milliGRAM(s) IV Push two times a day  Parenteral Nutrition - Adult 1 Each TPN Continuous <Continuous>  vancomycin    Solution 500 milliGRAM(s) Oral every 6 hours  vancomycin  Retention Enema 500 milliGRAM(s) Rectal every 6 hours      PRN INPATIENT MEDICATION  benzocaine 15 mG/menthol 3.6 mG (Sugar-Free) Lozenge 1 Lozenge Oral every 6 hours PRN        VITALS/PHYSICAL EXAM  --------------------------------------------------------------------------------  T(C): 36.4 (09-04-20 @ 04:00), Max: 36.4 (09-03-20 @ 12:00)  HR: 109 (09-04-20 @ 07:00) (101 - 114)  BP: 124/85 (09-04-20 @ 07:00) (112/84 - 159/83)  RR: 19 (09-04-20 @ 07:00) (11 - 53)  SpO2: 98% (09-04-20 @ 07:00) (95% - 100%)  Wt(kg): --        09-03-20 @ 07:01  -  09-04-20 @ 07:00  --------------------------------------------------------  IN: 2132.7 mL / OUT: 3035 mL / NET: -902.3 mL        PHYSICAL EXAM:  GEN:  guarded but stable, WD/WN/WG, (+)NGT  HEENT: NC/AT, PERRL, mucosa moist & throat clear, no trachea midline w/ neck supple  GI: (-) BS, firm, distended/ nontender (+)tympanitic   MSK:  FROM x4, no deformities  VASCULAR: Equally warm, no cyanosis, clubbing,          BLE edema equal,  R>LUE edema- soft nontender w/o cord or erythema  Neurology; no focal deficits, weakened strength & sensation grossly intact  Psych: normal affect  Skin: warm,  moist, & w/ good turgor      LABS/ CULTURES/ RADIOLOGY:              8.3    21.16 >-----------<  171      [09-04-20 @ 00:35]              25.8     150  |  115  |  90  ----------------------------<  214      [09-04-20 @ 00:36]  3.8   |  26  |  1.61        Ca     8.0     [09-04-20 @ 00:36]      Mg     2.2     [09-04-20 @ 00:36]      Phos  3.4     [09-03-20 @ 10:18]    TPro  7.3  /  Alb  1.8  /  TBili  1.0  /  DBili  x   /  AST  59  /  ALT  24  /  AlkPhos  91  [09-04-20 @ 00:36]    PT/INR: PT 14.4 , INR 1.22       [09-03-20 @ 18:34]  PTT: 36.7       [09-03-20 @ 18:34]          [09-03-20 @ 18:34]  Lactate Dehydrogenase, Serum: 550 U/L (08.26.20 @ 01:23)        CAPILLARY BLOOD GLUCOSE  POCT Blood Glucose.: 156 mg/dL (04 Sep 2020 06:59)  POCT Blood Glucose.: 152 mg/dL (04 Sep 2020 05:51)  POCT Blood Glucose.: 171 mg/dL (04 Sep 2020 05:11)  POCT Blood Glucose.: 227 mg/dL (04 Sep 2020 03:50)  POCT Blood Glucose.: 235 mg/dL (04 Sep 2020 02:51)  POCT Blood Glucose.: 207 mg/dL (04 Sep 2020 00:20)  POCT Blood Glucose.: 217 mg/dL (03 Sep 2020 18:11)  POCT Blood Glucose.: 225 mg/dL (03 Sep 2020 14:45)    Prealbumin, Serum: 10 mg/dL (09-02-20 @ 08:12)  Prealbumin, Serum: 24 mg/dL (08-25-20 @ 15:10)      Triglycerides, Serum: 358 mg/dL (09-03-20 @ 10:18)  Triglycerides, Serum: 116 mg/dL (09-02-20 @ 01:36) [facility] NUTRITION SUPPORT / TPN -- FOLLOW UP NOTE  --------------------------------------------------------------------------------    24 hour events/subjective:    (+)NGT  No n/v  No flatus  (+)liquid stool  no f/c/s  no dyspnea/ sob/ palp/ cp      Diet:  Diet, NPO:   Except Medications (08-27-20 @ 20:17)      Appetite: [x  ]Poor [  ]Adequate [  ]Good  Caloric intake:  [x   ]  Adequate   [   ] Inadequate    ROS: General/ GI see HPI  all other systems negative      ALLERGIES & MEDICATIONS  --------------------------------------------------------------------------------  No Known Allergies        STANDING INPATIENT MEDICATIONS    chlorhexidine 2% Cloths 1 Application(s) Topical <User Schedule>  cycloSPORINE  (SandIMMUNE) IVPB 10 milliGRAM(s) IV Intermittent every 24 hours  Eravacycline (Xerava) 75 milliGRAM(s),Sodium Chloride 0.9% 150 milliLiter(s) 75 milliGRAM(s) IV Intermittent every 12 hours  fat emulsion (Fish Oil and Plant Based) 20% Infusion 20.8 mL/Hr IV Continuous <Continuous>  heparin   Injectable 5000 Unit(s) SubCutaneous every 8 hours  insulin regular Infusion 1 Unit(s)/Hr IV Continuous <Continuous>  methylPREDNISolone sodium succinate Injectable 16 milliGRAM(s) IV Push <User Schedule>  pantoprazole  Injectable 40 milliGRAM(s) IV Push two times a day  Parenteral Nutrition - Adult 1 Each TPN Continuous <Continuous>  vancomycin    Solution 500 milliGRAM(s) Oral every 6 hours  vancomycin  Retention Enema 500 milliGRAM(s) Rectal every 6 hours      PRN INPATIENT MEDICATION  benzocaine 15 mG/menthol 3.6 mG (Sugar-Free) Lozenge 1 Lozenge Oral every 6 hours PRN        VITALS/PHYSICAL EXAM  --------------------------------------------------------------------------------  T(C): 36.4 (09-04-20 @ 04:00), Max: 36.4 (09-03-20 @ 12:00)  HR: 109 (09-04-20 @ 07:00) (101 - 114)  BP: 124/85 (09-04-20 @ 07:00) (112/84 - 159/83)  RR: 19 (09-04-20 @ 07:00) (11 - 53)  SpO2: 98% (09-04-20 @ 07:00) (95% - 100%)  Wt(kg): --        09-03-20 @ 07:01  -  09-04-20 @ 07:00  --------------------------------------------------------  IN: 2132.7 mL / OUT: 3035 mL / NET: -902.3 mL        PHYSICAL EXAM:  GEN:  guarded but stable, WD/WN/WG, (+)NGT  HEENT: NC/AT, PERRL, mucosa moist & throat clear, no trachea midline w/ neck supple  GI: (-) BS, firm, distended/ nontender (+)tympanitic   MSK:  FROM x4, no deformities  VASCULAR: Equally warm, no cyanosis, clubbing,        Lt IJ TLC &  LUE PICC w/ dressing c/d/i,         BLE edema equal,  R>LUE edema- soft nontender w/o cord or erythema  Neurology; no focal deficits, weakened strength & sensation grossly intact  Psych: normal affect  Skin: warm,  moist, & w/ good turgor      LABS/ CULTURES/ RADIOLOGY:              8.3    21.16 >-----------<  171      [09-04-20 @ 00:35]              25.8     150  |  115  |  90  ----------------------------<  214      [09-04-20 @ 00:36]  3.8   |  26  |  1.61        Ca     8.0     [09-04-20 @ 00:36]      Mg     2.2     [09-04-20 @ 00:36]      Phos  3.4     [09-03-20 @ 10:18]    TPro  7.3  /  Alb  1.8  /  TBili  1.0  /  DBili  x   /  AST  59  /  ALT  24  /  AlkPhos  91  [09-04-20 @ 00:36]    PT/INR: PT 14.4 , INR 1.22       [09-03-20 @ 18:34]  PTT: 36.7       [09-03-20 @ 18:34]          [09-03-20 @ 18:34]  Lactate Dehydrogenase, Serum: 550 U/L (08.26.20 @ 01:23)        CAPILLARY BLOOD GLUCOSE  POCT Blood Glucose.: 156 mg/dL (04 Sep 2020 06:59)  POCT Blood Glucose.: 152 mg/dL (04 Sep 2020 05:51)  POCT Blood Glucose.: 171 mg/dL (04 Sep 2020 05:11)  POCT Blood Glucose.: 227 mg/dL (04 Sep 2020 03:50)  POCT Blood Glucose.: 235 mg/dL (04 Sep 2020 02:51)  POCT Blood Glucose.: 207 mg/dL (04 Sep 2020 00:20)  POCT Blood Glucose.: 217 mg/dL (03 Sep 2020 18:11)  POCT Blood Glucose.: 225 mg/dL (03 Sep 2020 14:45)    Prealbumin, Serum: 10 mg/dL (09-02-20 @ 08:12)  Prealbumin, Serum: 24 mg/dL (08-25-20 @ 15:10)      Triglycerides, Serum: 358 mg/dL (09-03-20 @ 10:18)  Triglycerides, Serum: 116 mg/dL (09-02-20 @ 01:36) [facility] NUTRITION SUPPORT / TPN -- FOLLOW UP NOTE  --------------------------------------------------------------------------------    24 hour events/subjective:    (+)NGT  No n/v  No flatus  (+)liquid stool  no f/c/s  no dyspnea/ sob/ palp/ cp      Diet:  Diet, NPO:   Except Medications (08-27-20 @ 20:17)      Appetite: [x  ]Poor [  ]Adequate [  ]Good  Caloric intake:  [x   ]  Adequate   [   ] Inadequate    ROS: General/ GI see HPI  all other systems negative      ALLERGIES & MEDICATIONS  --------------------------------------------------------------------------------  No Known Allergies        STANDING INPATIENT MEDICATIONS    chlorhexidine 2% Cloths 1 Application(s) Topical <User Schedule>  cycloSPORINE  (SandIMMUNE) IVPB 10 milliGRAM(s) IV Intermittent every 24 hours  Eravacycline (Xerava) 75 milliGRAM(s),Sodium Chloride 0.9% 150 milliLiter(s) 75 milliGRAM(s) IV Intermittent every 12 hours  fat emulsion (Fish Oil and Plant Based) 20% Infusion 20.8 mL/Hr IV Continuous <Continuous>  heparin   Injectable 5000 Unit(s) SubCutaneous every 8 hours  insulin regular Infusion 1 Unit(s)/Hr IV Continuous <Continuous>  methylPREDNISolone sodium succinate Injectable 16 milliGRAM(s) IV Push <User Schedule>  pantoprazole  Injectable 40 milliGRAM(s) IV Push two times a day  Parenteral Nutrition - Adult 1 Each TPN Continuous <Continuous>  vancomycin    Solution 500 milliGRAM(s) Oral every 6 hours  vancomycin  Retention Enema 500 milliGRAM(s) Rectal every 6 hours      PRN INPATIENT MEDICATION  benzocaine 15 mG/menthol 3.6 mG (Sugar-Free) Lozenge 1 Lozenge Oral every 6 hours PRN        VITALS/PHYSICAL EXAM  --------------------------------------------------------------------------------  T(C): 36.4 (09-04-20 @ 04:00), Max: 36.4 (09-03-20 @ 12:00)  HR: 109 (09-04-20 @ 07:00) (101 - 114)  BP: 124/85 (09-04-20 @ 07:00) (112/84 - 159/83)  RR: 19 (09-04-20 @ 07:00) (11 - 53)  SpO2: 98% (09-04-20 @ 07:00) (95% - 100%)  Wt(kg): --        09-03-20 @ 07:01  -  09-04-20 @ 07:00  --------------------------------------------------------  IN: 2132.7 mL / OUT: 3035 mL / NET: -902.3 mL        PHYSICAL EXAM:  GEN:  guarded but stable, WD/WN/WG, (+)NGT  HEENT: NC/AT, PERRL, mucosa moist & throat clear, no trachea midline w/ neck supple  GI: (-) BS, firm, distended/ nontender (+)tympanitic   MSK:  FROM x4, no deformities  VASCULAR: Equally warm, no cyanosis, clubbing,        Lt IJ TLC &  LUE PICC w/ dressing c/d/i,         BLE mild edema equal,  R>LUE edema- soft nontender w/o cord or erythema  Neurology; no focal deficits, weakened strength & sensation grossly intact  Psych: normal affect  Skin: warm,  moist, & w/ good turgor      LABS/ CULTURES/ RADIOLOGY:              8.3    21.16 >-----------<  171      [09-04-20 @ 00:35]              25.8     150  |  115  |  90  ----------------------------<  214      [09-04-20 @ 00:36]  3.8   |  26  |  1.61        Ca     8.0     [09-04-20 @ 00:36]      Mg     2.2     [09-04-20 @ 00:36]      Phos  3.4     [09-03-20 @ 10:18]    TPro  7.3  /  Alb  1.8  /  TBili  1.0  /  DBili  x   /  AST  59  /  ALT  24  /  AlkPhos  91  [09-04-20 @ 00:36]    PT/INR: PT 14.4 , INR 1.22       [09-03-20 @ 18:34]  PTT: 36.7       [09-03-20 @ 18:34]          [09-03-20 @ 18:34]  Lactate Dehydrogenase, Serum: 550 U/L (08.26.20 @ 01:23)        CAPILLARY BLOOD GLUCOSE  POCT Blood Glucose.: 156 mg/dL (04 Sep 2020 06:59)  POCT Blood Glucose.: 152 mg/dL (04 Sep 2020 05:51)  POCT Blood Glucose.: 171 mg/dL (04 Sep 2020 05:11)  POCT Blood Glucose.: 227 mg/dL (04 Sep 2020 03:50)  POCT Blood Glucose.: 235 mg/dL (04 Sep 2020 02:51)  POCT Blood Glucose.: 207 mg/dL (04 Sep 2020 00:20)  POCT Blood Glucose.: 217 mg/dL (03 Sep 2020 18:11)  POCT Blood Glucose.: 225 mg/dL (03 Sep 2020 14:45)    Prealbumin, Serum: 10 mg/dL (09-02-20 @ 08:12)  Prealbumin, Serum: 24 mg/dL (08-25-20 @ 15:10)      Triglycerides, Serum: 358 mg/dL (09-03-20 @ 10:18)  Triglycerides, Serum: 116 mg/dL (09-02-20 @ 01:36)

## 2020-09-04 NOTE — PROGRESS NOTE ADULT - SUBJECTIVE AND OBJECTIVE BOX
Subjective: He noted abdominal pain overnight, which was relieved with Tylenol. He currently has no shortness of breath or abdominal pain.     Medications:  benzocaine 15 mG/menthol 3.6 mG (Sugar-Free) Lozenge 1 Lozenge Oral every 6 hours PRN  chlorhexidine 2% Cloths 1 Application(s) Topical <User Schedule>  cycloSPORINE  (SandIMMUNE) IVPB 10 milliGRAM(s) IV Intermittent every 24 hours  Eravacycline (Xerava) 75 milliGRAM(s),Sodium Chloride 0.9% 150 milliLiter(s) 75 milliGRAM(s) IV Intermittent every 12 hours  fat emulsion (Fish Oil and Plant Based) 20% Infusion 20.8 mL/Hr IV Continuous <Continuous>  heparin   Injectable 5000 Unit(s) SubCutaneous every 8 hours  insulin regular Infusion 1 Unit(s)/Hr IV Continuous <Continuous>  methylPREDNISolone sodium succinate Injectable 16 milliGRAM(s) IV Push <User Schedule>  pantoprazole  Injectable 40 milliGRAM(s) IV Push two times a day  Parenteral Nutrition - Adult 1 Each TPN Continuous <Continuous>  vancomycin    Solution 500 milliGRAM(s) Oral every 6 hours  vancomycin  Retention Enema 500 milliGRAM(s) Rectal every 6 hours      Physical Exam:    Vital Signs Last 24 Hrs  T(C): 36.4 (04 Sep 2020 04:00), Max: 36.4 (03 Sep 2020 08:00)  T(F): 97.5 (04 Sep 2020 04:00), Max: 97.6 (03 Sep 2020 20:00)  HR: 109 (04 Sep 2020 06:00) (101 - 114)  BP: 143/63 (04 Sep 2020 06:00) (112/84 - 159/83)  BP(mean): 91 (04 Sep 2020 06:00) (80 - 118)  RR: 11 (04 Sep 2020 06:00) (11 - 53)  SpO2: 98% (04 Sep 2020 06:00) (95% - 100%)     Daily Weight in k.8 (04 Sep 2020 00:00)    I&O's Summary    03 Sep 2020 07:01  -  04 Sep 2020 07:00  --------------------------------------------------------  IN: 2069.6 mL / OUT: 2755 mL / NET: -685.4 mL    General: No distress. Comfortable.  HEENT: EOM intact.  Neck: Neck supple. JVP not elevated. No masses  Chest: Clear to auscultation bilaterally  CV: Tachycardic, regular. Normal S1 and S2. No rubs or gallops. Radial pulses normal. 1+ edema in legs and arms, more so on right  Abdomen: Distended and taught, but non-tender.   Skin: No rashes  Neurology: Alert and oriented times three. Sensation intact  Psych: Affect normal    Labs:                        8.3    21.16 )-----------( 171      ( 04 Sep 2020 00:35 )             25.8         150<H>  |  115<H>  |  90<H>  ----------------------------<  214<H>  3.8   |  26  |  1.61<H>    Ca    8.0<L>      04 Sep 2020 00:36  Phos  3.4       Mg     2.2         TPro  7.3  /  Alb  1.8<L>  /  TBili  1.0  /  DBili  x   /  AST  59<H>  /  ALT  24  /  AlkPhos  91      PT/INR - ( 03 Sep 2020 18:34 )   PT: 14.4 sec;   INR: 1.22 ratio    PTT - ( 03 Sep 2020 18:34 )  PTT:36.7 sec    Lactate Dehydrogenase, Serum: 745 U/L ( @ 18:34)      Total Cholesterol: --  LDL: --  HDL: --  T    Cyclosporine Level: 76: CYCLOSPORINE: Assay performed by Chemiluminescent Microparticle  Immunoassay (CMIA) on the [employer] system.  All immunoassay tests  are subject to rare interferences from heterophile antibodies so that if  atypical or unexpected results are obtained the lab should be notified to  confirm this result by an alternative method before adjusting medication  dosage. The therapeutic range of 150-400 ng/mL is based on trough levels  of Cyclosporine but levels for clinical management will vary depending on  the organ transplanted, post-dose time of draw, length of time  post-transplant and the individual patient's metabolism. ng/mL (20 @ 12:35)

## 2020-09-04 NOTE — PROVIDER CONTACT NOTE (CHANGE IN STATUS NOTIFICATION) - ACTION/TREATMENT ORDERED:
SHELLY Ohara at bedside. Neuro status reassessed. No intervention ordered at this time, will continue to monitor. Add precedex for anxiety.

## 2020-09-04 NOTE — PROGRESS NOTE ADULT - PROBLEM SELECTOR PLAN 2
- We will adjust IV cyclosporine as necessary. Currently at 10mg/24hours to reduce overall level of immunosuppression. Everolimus level was 5.3 on 8/31 and has been discontinued.     - Continue current methylprednisone dose (equivalent to 20 mg daily of prednisone). We will gradually decrease the dose.

## 2020-09-04 NOTE — PROVIDER CONTACT NOTE (CHANGE IN STATUS NOTIFICATION) - ASSESSMENT
Patient is disoriented to time and situation, oriented to name, place and date of birth. Increasingly hypophonic. Continues to have equal pupils & strength. Abdomen is slightly more distended than this AM, pt denies pain or discomfort on palpation. Remains hypertensive (see vitals) after 5mg and 10mg hydralazine IV push given per orders. Pt states he feels anxious.

## 2020-09-04 NOTE — CHART NOTE - NSCHARTNOTEFT_GEN_A_CORE
Nutrition Follow Up Note    Patient seen for: Malnutrition follow up     Source: RN, Medical record, CTU team, TPN team     Chart reviewed, events noted. 70 year old male with PMHx of NICM, s/p HM2 LVAD and OHT in 2018, with known coronary fistula. Recent admission for hemolytic anemia, now admitted for hyperglycemia and GIB. EGD and capsule study negative. Pt now C-Diff colitis w/o toxic megacolon. Pt receiving Vanco retention enema and Flagyl. Pt getting IVIG x5 days.  Pt being followed by surgery team, no intervention at this time.  TPN team following, Pt now receiving full dose TPN.   Per Surgery team, plan for NGT 4 hour clamp trial today    Diet: NPO with TPN.   TPN infusing at 58ml/hr (135Gm amino acids, 280Gm dextrose, 50mg 20%lipids) to provide  1992cal (26.4cal/kg, 1.79Gm prot/Kg per adm wt: 75.2kg).   Insulin : 15 units, BG remain elevated but trending down.     NGT out put:   : 550ml  9/3: 375ml  : 100ml thus far     BM: 2 BM's on . No BM's on 9/3.   Per Pt feels he will have a BM today.     per RN, abdomin remains distended.     Patient reports: Pt seen in bed, reports feeling okay. Pt reports some abdominal pain earlier but now resolved.   Pt aware he is receiving TPN for nutrition support, denies any questions or concerns related to this at this time.     Daily Weight in k.8 (), Weight in k.4 (), Weight in k (), Weight in k.1 (), Weight in k.4 (), Weight in k.6 (), Weight in k.2 ()  Weight trending down, likely related to diuresis. Will continue to trend.     Drug Dosing Weight  Weight (kg): 75.2 (17 Aug 2020 14:29)  BMI (kg/m2): 26 (17 Aug 2020 14:29)      Pertinent Medications: MEDICATIONS  (STANDING):  chlorhexidine 2% Cloths 1 Application(s) Topical <User Schedule>  cycloSPORINE  (SandIMMUNE) IVPB 10 milliGRAM(s) IV Intermittent every 24 hours  Eravacycline (Xerava) 75 milliGRAM(s),Sodium Chloride 0.9% 150 milliLiter(s) 75 milliGRAM(s) IV Intermittent every 12 hours  fat emulsion (Fish Oil and Plant Based) 20% Infusion 20.8 mL/Hr (20.8 mL/Hr) IV Continuous <Continuous>  heparin   Injectable 5000 Unit(s) SubCutaneous every 8 hours  insulin regular Infusion 1 Unit(s)/Hr (1 mL/Hr) IV Continuous <Continuous>  methylPREDNISolone sodium succinate Injectable 16 milliGRAM(s) IV Push <User Schedule>  pantoprazole  Injectable 40 milliGRAM(s) IV Push two times a day  Parenteral Nutrition - Adult 1 Each (58 mL/Hr) TPN Continuous <Continuous>  vancomycin    Solution 500 milliGRAM(s) Oral every 6 hours  vancomycin  Retention Enema 500 milliGRAM(s) Rectal every 6 hours    MEDICATIONS  (PRN):  benzocaine 15 mG/menthol 3.6 mG (Sugar-Free) Lozenge 1 Lozenge Oral every 6 hours PRN Sore Throat      LABS:    @ 00:36: Sodium 150<H>, Potassium 3.8, Calcium 8.0<L>, Magnesium 2.2, Phosphorus --, BUN 90<H>, Creatinine 1.61<H>, Glucose 214<H>, Alk Phos 91, ALT/SGPT 24, AST/SGOT 59<H>, Albumin 1.8<L>, Prealbumin --, Total Bilirubin 1.0, Hemoglobin --, Hematocrit --, Ferritin --, C-Reactive Protein --, Creatine Kinase <<27>   @ 00:35: Sodium --, Potassium --, Calcium --, Magnesium --, Phosphorus --, BUN --, Creatinine --, Glucose --, Alk Phos --, ALT/SGPT --, AST/SGOT --, Albumin --, Prealbumin --, Total Bilirubin --, Hemoglobin 8.3<L>, Hematocrit 25.8<L>, Ferritin --, C-Reactive Protein --, Creatine Kinase <<27>   @ 10:18: Sodium 144, Potassium 3.8, Calcium 8.2<L>, Magnesium 2.5, Phosphorus 3.4, BUN 93<H>, Creatinine 1.72<H>, Glucose 183<H>, Alk Phos 92, ALT/SGPT 24, AST/SGOT 51<H>, Albumin 1.9<L>, Prealbumin --, Total Bilirubin 1.0, Hemoglobin --, Hematocrit --, Ferritin --, C-Reactive Protein --, Creatine Kinase <<27>      Finger Sticks:  POCT Blood Glucose.: 156 mg/dL ( @ 06:59)  POCT Blood Glucose.: 152 mg/dL ( @ 05:51)  POCT Blood Glucose.: 171 mg/dL ( @ 05:11)  POCT Blood Glucose.: 227 mg/dL ( @ 03:50)  POCT Blood Glucose.: 235 mg/dL ( @ 02:51)  POCT Blood Glucose.: 207 mg/dL ( @ 00:20)  POCT Blood Glucose.: 217 mg/dL ( @ 18:11)  POCT Blood Glucose.: 225 mg/dL ( @ 14:45)      Skin per nursing documentation: intact  Edema: +1 generalized, +3 left hand, arm, ankle and tara foot, +4 right arm and hand     Estimated Needs: based on 75.2 Kg, with consideration for TPN  Energy: (25-30kcal/kg): 1880-2256kcal   Protein:  (1.6-1.8g protein/kg): 120-135g protein    Previous Nutrition Diagnosis: altered nutrition lab values  Nutrition Diagnosis is: defer at this time     Previous Nutrition Diagnosis: inadequate oral intake  Nutrition Diagnosis is: on going at this time.     Previous Nutrition Diagnosis: acute severe protein calorie malnutrition  Nutrition Diagnosis: remains appropriate, ongoing with pending initiation of TPN    New Nutrition Diagnosis:      Recommended Interventions:   1. TPN per TPN Team/Nutrition Assessment  2. Defer diet advancement to medical team. Monitor GI function.  3. Trend BG levels, renal indices, LFT's, electrolytes and triglycerides     Monitoring and Evaluation:   Continue to monitor nutritional intake, tolerance to diet prescription, weights, labs, skin integrity  RD remains available upon request and will follow up per protocol  Gabbi Flowers RD, CDN, [facility] Pager #[phone] Nutrition Follow Up Note    Patient seen for: Malnutrition follow up     Source: RN, Medical record, CTU team, TPN team     Chart reviewed, events noted. 70 year old male with PMHx of NICM, s/p HM2 LVAD and OHT in 2018, with known coronary fistula. Recent admission for hemolytic anemia, now admitted for hyperglycemia and GIB. EGD and capsule study negative. Pt now C-Diff colitis w/o toxic megacolon. Pt receiving Vanco retention enema and Flagyl. Pt getting IVIG x5 days.  Pt being followed by surgery team, no intervention at this time.  TPN team following, Pt now receiving full dose TPN.   Per Surgery team, plan for NGT 4 hour clamp trial today    Diet: NPO with TPN.   TPN infusing at 58ml/hr (135Gm amino acids, 280Gm dextrose, 50mg 20%lipids) to provide  1992cal (26.4cal/kg, 1.79Gm prot/Kg per adm wt: 75.2kg).   Insulin : 15 units, BG remain elevated but trending down.     NGT out put:   : 550ml  9/3: 375ml  : 100ml thus far     BM: 2 BM's on . No BM's on 9/3.   Per Pt feels he will have a BM today.     per RN, abdomin remains distended.     Triglyceride levels on 9/3: 358, trending up     Patient reports: Pt seen in bed, reports feeling okay. Pt reports some abdominal pain earlier but now resolved.   Pt aware he is receiving TPN for nutrition support, denies any questions or concerns related to this at this time.     Daily Weight in k.8 (), Weight in k.4 (), Weight in k (), Weight in k.1 (), Weight in k.4 (), Weight in k.6 (30), Weight in k.2 ()  Weight trending down, likely related to diuresis. Will continue to trend.     Drug Dosing Weight  Weight (kg): 75.2 (17 Aug 2020 14:29)  BMI (kg/m2): 26 (17 Aug 2020 14:29)      Pertinent Medications: MEDICATIONS  (STANDING):  chlorhexidine 2% Cloths 1 Application(s) Topical <User Schedule>  cycloSPORINE  (SandIMMUNE) IVPB 10 milliGRAM(s) IV Intermittent every 24 hours  Eravacycline (Xerava) 75 milliGRAM(s),Sodium Chloride 0.9% 150 milliLiter(s) 75 milliGRAM(s) IV Intermittent every 12 hours  fat emulsion (Fish Oil and Plant Based) 20% Infusion 20.8 mL/Hr (20.8 mL/Hr) IV Continuous <Continuous>  heparin   Injectable 5000 Unit(s) SubCutaneous every 8 hours  insulin regular Infusion 1 Unit(s)/Hr (1 mL/Hr) IV Continuous <Continuous>  methylPREDNISolone sodium succinate Injectable 16 milliGRAM(s) IV Push <User Schedule>  pantoprazole  Injectable 40 milliGRAM(s) IV Push two times a day  Parenteral Nutrition - Adult 1 Each (58 mL/Hr) TPN Continuous <Continuous>  vancomycin    Solution 500 milliGRAM(s) Oral every 6 hours  vancomycin  Retention Enema 500 milliGRAM(s) Rectal every 6 hours    MEDICATIONS  (PRN):  benzocaine 15 mG/menthol 3.6 mG (Sugar-Free) Lozenge 1 Lozenge Oral every 6 hours PRN Sore Throat      LABS:    @ 00:36: Sodium 150<H>, Potassium 3.8, Calcium 8.0<L>, Magnesium 2.2, Phosphorus --, BUN 90<H>, Creatinine 1.61<H>, Glucose 214<H>, Alk Phos 91, ALT/SGPT 24, AST/SGOT 59<H>, Albumin 1.8<L>, Prealbumin --, Total Bilirubin 1.0, Hemoglobin --, Hematocrit --, Ferritin --, C-Reactive Protein --, Creatine Kinase <<27>   @ 00:35: Sodium --, Potassium --, Calcium --, Magnesium --, Phosphorus --, BUN --, Creatinine --, Glucose --, Alk Phos --, ALT/SGPT --, AST/SGOT --, Albumin --, Prealbumin --, Total Bilirubin --, Hemoglobin 8.3<L>, Hematocrit 25.8<L>, Ferritin --, C-Reactive Protein --, Creatine Kinase <<27>   @ 10:18: Sodium 144, Potassium 3.8, Calcium 8.2<L>, Magnesium 2.5, Phosphorus 3.4, BUN 93<H>, Creatinine 1.72<H>, Glucose 183<H>, Alk Phos 92, ALT/SGPT 24, AST/SGOT 51<H>, Albumin 1.9<L>, Prealbumin --, Total Bilirubin 1.0, Hemoglobin --, Hematocrit --, Ferritin --, C-Reactive Protein --, Creatine Kinase <<27>      Finger Sticks:  POCT Blood Glucose.: 156 mg/dL ( @ 06:59)  POCT Blood Glucose.: 152 mg/dL ( @ 05:51)  POCT Blood Glucose.: 171 mg/dL ( @ 05:11)  POCT Blood Glucose.: 227 mg/dL ( @ 03:50)  POCT Blood Glucose.: 235 mg/dL ( @ 02:51)  POCT Blood Glucose.: 207 mg/dL ( @ 00:20)  POCT Blood Glucose.: 217 mg/dL ( @ 18:11)  POCT Blood Glucose.: 225 mg/dL ( @ 14:45)      Skin per nursing documentation: intact  Edema: +1 generalized, +3 left hand, arm, ankle and tara foot, +4 right arm and hand     Estimated Needs: based on 75.2 Kg, with consideration for TPN  Energy: (25-30kcal/kg): 1880-2256kcal   Protein:  (1.6-1.8g protein/kg): 120-135g protein    Previous Nutrition Diagnosis: altered nutrition lab values  Nutrition Diagnosis is: defer at this time     Previous Nutrition Diagnosis: inadequate oral intake  Nutrition Diagnosis is: on going at this time.     Previous Nutrition Diagnosis: acute severe protein calorie malnutrition  Nutrition Diagnosis: remains appropriate, ongoing with pending initiation of TPN    New Nutrition Diagnosis:      Recommended Interventions:   1. TPN per TPN Team/Nutrition Assessment  2. Defer diet advancement to medical team. Monitor GI function.  3. Trend BG levels, renal indices, LFT's, electrolytes and triglycerides     Monitoring and Evaluation:   Continue to monitor nutritional intake, tolerance to diet prescription, weights, labs, skin integrity  RD remains available upon request and will follow up per protocol  Gabbi Flowers RD, CDN, [facility] Pager #[phone]

## 2020-09-04 NOTE — PROGRESS NOTE ADULT - ASSESSMENT
A/P:  70y M with history of heart transplant in 2/2018 admitted with autoimmune hemolytic anemia and dark stools requiring transfusion. Found to have c diff and abdominal distension on 8/23.  Pt developed Ileus w/ NGT placement and remains NPO.    TPN consulted to assist w/ management in pt who will be NPO for a prolonged period of time  Pt w/ Acute Severe Protein-Calorie Malnutrition    TPN  GOAL was  135g AA, 280g Dextrose, & 50g Lipids  HyperTg- Holding Lipids today        continue to monitor & reeval need to restart at low amount and          whether to increase AA &/or Dextrose to compensate for the lower amt of calories      - Hyperglycemia- improving w/ 15U insulin              will continue to monitor as SHELDON also Improving              FS q6H w/ ISS coverage to be ordered by primary team  - Strict Intake and Output- fluid overload improving             HyperNa - increase volume from 1.4L to 2L             NaCl 0mEq, NaPhos 30mMol, NaAce 40mEq             Pt given Lasix             Sheldon resolving  - HyperMg- improving w/ MgSO4 8mEq  - HypoPhos improving w/ 30mMol NaPhos              continue to monitor  - Weights three times a week  - Monitor  CMP, Mg, iCa, & Phos daily  - Baseline Prealbumin, then check weekly  - Central line w/ designated port for TPN, maintenance as per protocol  - Continue monitoring as per CTU w/ Surgery, will follow with lobito d/w CTU team    Gena Causey PA-C  (B) [phone]  d/w Dr Tariq A/P:  70y M with history of heart transplant in 2/2018 admitted with autoimmune hemolytic anemia and dark stools requiring transfusion. Found to have c diff and abdominal distension on 8/23.  Pt developed Ileus w/ NGT placement and remains NPO.    TPN consulted to assist w/ management in pt who will be NPO for a prolonged period of time  Pt w/ Acute Severe Protein-Calorie Malnutrition    TPN  GOAL was  135g AA, 280g Dextrose, & 50g Lipids  HyperTg- Holding Lipids today        continue to monitor & reeval need to restart at a lower amount and          whether to increase AA &/or Dextrose to compensate for the lower amt of calories      - Hyperglycemia- improving w/ 15U insulin              will continue to monitor as SHELDON also Improving              FS q6H w/ ISS coverage to be ordered by primary team  - Strict Intake and Output- fluid overload improving             HyperNa - increase volume from 1.4L to 2L             NaCl 0mEq, NaPhos 30mMol, NaAce 40mEq             Pt given Lasix             Sheldon resolving  - HyperMg- improving w/ MgSO4 8mEq  - HypoPhos improving w/ 30mMol NaPhos              continue to monitor  - Weights as per protocol  - Monitor  CMP, Mg, iCa, & Phos daily  - Baseline Prealbumin, then check weekly  - Central line w/ designated port for TPN, maintenance as per protocol  - Continue monitoring as per CTU w/ Surgery, will follow with lobito d/w CTU team    Gena Causey PA-C  (B) [phone]  d/w Dr Tariq A/P:  70y M with history of heart transplant in 2/2018 admitted with autoimmune hemolytic anemia and dark stools requiring transfusion. Found to have c diff and abdominal distension on 8/23.  Pt developed Ileus w/ NGT placement and remains NPO.    TPN consulted to assist w/ management in pt who will be NPO for a prolonged period of time  Pt w/ Acute Severe Protein-Calorie Malnutrition    TPN  GOAL was  135g AA, 280g Dextrose, & 50g Lipids  HyperTg- Holding Lipids today        continue to monitor & reeval need to restart at a lower amount and          whether to increase AA &/or Dextrose to compensate for the lower amt of calories      - Hyperglycemia- improving w/ 15U insulin              will continue to monitor as NORMAN also Improving              FS q6H w/ ISS coverage to be ordered by primary team  - Strict Intake and Output- fluid overload improving             HyperNa - increase volume from 1.4L to 2L             NaCl 0mEq, NaPhos 30mMol, NaAce 40mEq             Pt given Lasix             Norman resolving  - HyperMg- improving w/ MgSO4 8mEq  - HypoPhos improving w/ 30mMol NaPhos              continue to monitor  - Weights as per protocol  - Monitor  CMP, Mg, iCa, & Phos daily  - Baseline Prealbumin, then check weekly  - Central line w/ designated port for TPN, maintenance as per protocol  - Continue monitoring as per CTU w/ Surgery, will follow with lobito, d/w CTU team    Gena Causye PA-C  (B) [phone]  d/w Ayan Hansen & Marciano

## 2020-09-04 NOTE — PROGRESS NOTE ADULT - SUBJECTIVE AND OBJECTIVE BOX
Comfortable, passing gas and stool.  NG 300cc/24 hrs.  Suggest clamp NG 4 hours, then measure gastric residual; if less than 150, d/c NG, start sips

## 2020-09-04 NOTE — PROGRESS NOTE ADULT - ATTENDING COMMENTS
Patient seen and examined with Dr. Infante    I agree with his interval history exam and plans as noted above    Patient remains with severe C.diff colitis with a stable to slightly improved abdominal distension/ileus  Decreasing immunosuppression with proportionally increasing leukocytosis  Would send blood cultures x2 sets  would check fungitell    Continue oral Vancomycin 500mg qid and Vancomycin by enemas  Eravacycline course scheduled to complete on 9/6/20  TPN started through   General Surgery to continue to follow for abdominal exam worsening    Gary Lujan MD  [phone]  After 5pm/weekends [phone]

## 2020-09-04 NOTE — PROGRESS NOTE ADULT - SUBJECTIVE AND OBJECTIVE BOX
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 Patient was treated w/ prednisone and Rituximab. Tacro was discontinued and patient was also transitioned to everolimus  Admitted  6/16-6/19  for hyperglycemia.  Reported to transplant team having one done black tarry stool-  instructed to  present to [facility] for hemolytic anemia. Patient has been following with Hematology outpatient.  Denies CP  N/V diarrhea SOB. (11 Aug 2020 20:08)      Surgery/Hospital Course:  8/11 Admitted to [facility] with symptomatic anemia  8/13 EGD for investigation of tarry stools  8/15 SB capsule: stomach & SB lesions, likely ulcers.  8/17 EGD/push enteroscopy w/ angioectasias/erosions in small bowel. Gastropathy and esophagitis. Ulcer seen on VCE most likely scope trauma.    8/18 VSS transferred back to floor.   8/20 VS 9.0/28.4 stable Gastric biopsy results LA Grade A esophagitis.  - Acute gastritis. Biopsies taken to r/o CMV, No ulceration seen despite finding on capsule endoscopy - likely 2/2 scope  trauma that has since resolved. Pathology pending.  8/21 VSS H/H  8.1/25.7  trending down will monitor prednisone taper 30 mg today. Procardia 120 initiated today RHC biopsy Monday.   8/22 VVS; Patient reports loose stools x 2-3 days with 1 episode today.  Stool sent for C-dif and GI PCR. Abdominal X-ray revealed: dilated loops of bowel. Abdomen distended.  Patient denies N/V. GI called to re-evaluate. Continue with current medication regimen.  Awaiting for upcoming cardiac biopsy on Monday 8/24.  8/23   vss    creat up to 2.28 ,  repeating CMP,  TTE ordered  Bladder scan   8/24   cardiac bx cancelled   elev creat  to 2.74   cardio renal cslt called,    + CDIF   inc vanco to 500 q6   ID following  8/25 VSS: RHC today as per transplant team; transfuse 2 units PRBC today as per Dr. Viramontes; continue vanco for cdiff; transfer patient to CTU after cath today   8/26. Dieretic challenge with good response   8/27: Downgraded to the floor then upgraded to the CTU again for concerning of abd distention   8/28 CT ABD & Pelvis reveals pancolitis  8/30: repeat CT scan of abd, gen surgery called to re-evaluated pt.  8/31: RUE duplex negative for DVT    Today:    REVIEW OF SYSTEMS:  Gen: No fever  EYES/ENT: No visual changes;  No vertigo or throat pain   NECK: No pain   RES:  No shortness of breath or Cough  CARD: No chest pain   GI: No abdominal pain  : No dysuria  NEURO: No weakness  SKIN: No itching, rashes     ICU Vital Signs Last 24 Hrs  T(C): 36.2 (04 Sep 2020 08:00), Max: 36.4 (03 Sep 2020 12:00)  T(F): 97.2 (04 Sep 2020 08:00), Max: 97.6 (03 Sep 2020 20:00)  HR: 105 (04 Sep 2020 11:00) (101 - 114)  BP: 156/70 (04 Sep 2020 11:00) (112/84 - 168/95)  BP(mean): 100 (04 Sep 2020 11:00) (65 - 118)  ABP: --  ABP(mean): --  RR: 22 (04 Sep 2020 11:00) (11 - 53)  SpO2: 100% (04 Sep 2020 11:00) (95% - 100%)    Physical Exam:  Gen:  Awake, alert   CNS: non focal 	  Neck: no JVD  RES : clear , no wheezing              CVS: Sinus Tachycardia. Normal S1/S2  Abd: firm, non-tender, distended. Bowel sounds absent. +NGT to LWS  Skin: No rash.  Ext:  +1 pedal edema    ============================I/O===========================   I&O's Detail    03 Sep 2020 07:01  -  04 Sep 2020 07:00  --------------------------------------------------------  IN:    Enteral Tube Flush: 100 mL    fat emulsion (Fish Oil and Plant Based) 20% Infusion: 1061.6 mL    fat emulsion (Fish Oil and Plant Based) 20% Infusion: 612 mL    insulin regular Infusion: 15 mL    Solution: 44.1 mL    Solution: 300 mL  Total IN: 2132.7 mL    OUT:    Indwelling Catheter - Urethral: 2810 mL    Nasoenteral Tube: 225 mL  Total OUT: 3035 mL    Total NET: -902.3 mL      04 Sep 2020 07:01  -  04 Sep 2020 11:09  --------------------------------------------------------  IN:    Enteral Tube Flush: 40 mL    fat emulsion (Fish Oil and Plant Based) 20% Infusion: 232 mL    insulin regular Infusion: 12 mL    Solution: 8.4 mL  Total IN: 292.4 mL    OUT:    Indwelling Catheter - Urethral: 405 mL  Total OUT: 405 mL    Total NET: -112.6 mL    ============================ LABS =========================                        8.3    21.16 )-----------( 171      ( 04 Sep 2020 00:35 )             25.8     09-04    150<H>  |  115<H>  |  90<H>  ----------------------------<  214<H>  3.8   |  26  |  1.61<H>    Ca    8.0<L>      04 Sep 2020 00:36  Phos  3.4     09-03  Mg     2.2     09-04    TPro  7.3  /  Alb  1.8<L>  /  TBili  1.0  /  DBili  x   /  AST  59<H>  /  ALT  24  /  AlkPhos  91  09-04    LIVER FUNCTIONS - ( 04 Sep 2020 00:36 )  Alb: 1.8 g/dL / Pro: 7.3 g/dL / ALK PHOS: 91 U/L / ALT: 24 U/L / AST: 59 U/L / GGT: x           PT/INR - ( 03 Sep 2020 18:34 )   PT: 14.4 sec;   INR: 1.22 ratio      PTT - ( 03 Sep 2020 18:34 )  PTT:36.7 sec    ======================Micro/Rad/Cardio=================  Culture: Reviewed   CXR: Reviewed  Echo: Reviewed  ======================================================  PAST MEDICAL & SURGICAL HISTORY:  H/O hemolytic anemia  H/O autoimmune hemolytic anemia  Knee pain, right  HLD (hyperlipidemia)  Former smoker  DVT of upper extremity (deep vein thrombosis)  Hepatitis C virus  GIB (gastrointestinal bleeding)  Ventricular fibrillation: s/p AICD  PAF (paroxysmal atrial fibrillation): on xarelto  Non-Ischemic Cardiomyopathy  SVT (Supraventricular Tachycardia)  HTN  CHF (Congestive Heart Failure)  S/P right heart catheterization: biopsy multiple  H/O heart transplant: 2/2018  Status post left hip replacement  History of Prior Ablation Treatment: for afib  AICD (Automatic Cardioverter/Defibrillator) Present: [employer] with 1 [employer] lead4/1/09- explanted and replaced with Medtronic 2 leads on 9/2/09    ====================ASSESSMENT ==============  Heart transplant 2/2018 for ACC/AHA stage D NICM   Resolved GI Bleed, Melena s/p EGD  Acute respiratory failure, resolved  Supraventricular tachycardia  Severe hypertension  Hyperlactatemia acidosis, resolved  Leukopenia- resolved  Acute blood loss anemia, stable   CKD Stage III  C. diff diarrhea  Klebsiella oxytoca bacteremia  Sepsis  Azotemia 2/2 CKD and Steroids   Pancolitis    Plan:  ====================== NEUROLOGY=====================  Nonfocal, continue to monitor neuro status   Continue PT, out of bed to chair as tolerated    ==================== RESPIRATORY======================   On supplemental O2 therapy via 2L NC, wean off as tolerated   Encourage incentive spirometry, continue pulse ox monitoring, follow ABGs     ====================CARDIOVASCULAR==================  Heart transplant 2/2018 for ACC/AHA stage D NICM, now on solumedrol from prednisone   (NPO for abd distention) off everolimus. Off cellcept while on high dose steroids   Continue hemodynamic monitoring   ASA on hold for hx of GI bleed / ulceration   ECHO to be done today per HF team     methylPREDNISolone sodium succinate Injectable 16 milliGRAM(s) IV Push <User Schedule>    ===================HEMATOLOGIC/ONC ===================  Anemia s/p multiple blood transfusions  Monitor H&H and transfuse prn   GI Bleeding ruled out s/p EGD, found to have ulcers. F/u heme recs for hx of autoimmune hemolytic anemia(ldh, hapto and Cogas ordered)   Heparin for DVT prophylaxis.     heparin   Injectable 5000 Unit(s) SubCutaneous every 8 hours    ===================== RENAL =========================  NORMAN on CKD stage 3, Anasarca. Lasix PRN  Continue to monitor I/Os, BUN/Cr, and urine output.   Monitor and replete electrolytes prn     ==================== GASTROINTESTINAL===================  NPO in setting of abdominal distention   CT A/P on 8/30 with Pancolitis and small volume abdominopelvic ascites, not significantly changed since 8/20/2020.  General surgery following-continue serial abdominal exams. No surgical intervention at this time   Continue Protonix for stress ulcer prophylaxis.    fat emulsion (Fish Oil and Plant Based) 20% Infusion 20.8 mL/Hr (20.8 mL/Hr) IV Continuous <Continuous>  pantoprazole  Injectable 40 milliGRAM(s) IV Push two times a day  Parenteral Nutrition - Adult 1 Each (58 mL/Hr) TPN Continuous <Continuous>    =======================    ENDOCRINE  =====================  Glucose control with Humalog sliding scale for stress hyperglycemia     insulin regular Infusion 1 Unit(s)/Hr (1 mL/Hr) IV Continuous <Continuous>    ========================INFECTIOUS DISEASE================  Klebsiella bacteremia from 8/13 which has since cleared  Clostridium difficile PCR positive, continue with vancomycin PO and Rectal Vanco for worsening  c/w eravacycline for CDiff   Transplant prophylaxis with Posaconazole and Atovaquone on hold duo to ileus   Monitor Fever / leukocytosis.    vancomycin    Solution 500 milliGRAM(s) Oral every 6 hours  vancomycin  Retention Enema 500 milliGRAM(s) Rectal every 6 hours      Patient requires continuous monitoring with bedside rhythm monitoring, pulse ox monitoring, and intermittent blood gas analysis. Care plan discussed with ICU care team. Patient remained critical and at risk for life threatening decompensation.     By signing my name below, I, Angie Mg, attest that this documentation has been prepared under the direction and in the presence of Li Ohraa PA.  Electronically signed: bA Garcia, 09-04-20 @ 11:09    I, Li Ohara, personally performed the services described in this documentation. All medical record entries made by the ab were at my direction and in my presence. I have reviewed the chart and agree that the record reflects my personal performance and is accurate and complete  Electronically signed: Li Ohara PA. 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 Patient was treated w/ prednisone and Rituximab. Tacro was discontinued and patient was also transitioned to everolimus  Admitted  6/16-6/19  for hyperglycemia.  Reported to transplant team having one done black tarry stool-  instructed to  present to [facility] for hemolytic anemia. Patient has been following with Hematology outpatient.  Denies CP  N/V diarrhea SOB. (11 Aug 2020 20:08)      Surgery/Hospital Course:  8/11 Admitted to [facility] with symptomatic anemia  8/13 EGD for investigation of tarry stools  8/15 SB capsule: stomach & SB lesions, likely ulcers.  8/17 EGD/push enteroscopy w/ angioectasias/erosions in small bowel. Gastropathy and esophagitis. Ulcer seen on VCE most likely scope trauma.    8/18 VSS transferred back to floor.   8/20 VS 9.0/28.4 stable Gastric biopsy results LA Grade A esophagitis.  - Acute gastritis. Biopsies taken to r/o CMV, No ulceration seen despite finding on capsule endoscopy - likely 2/2 scope  trauma that has since resolved. Pathology pending.  8/21 VSS H/H  8.1/25.7  trending down will monitor prednisone taper 30 mg today. Procardia 120 initiated today RHC biopsy Monday.   8/22 VVS; Patient reports loose stools x 2-3 days with 1 episode today.  Stool sent for C-dif and GI PCR. Abdominal X-ray revealed: dilated loops of bowel. Abdomen distended.  Patient denies N/V. GI called to re-evaluate. Continue with current medication regimen.  Awaiting for upcoming cardiac biopsy on Monday 8/24.  8/23   vss    creat up to 2.28 ,  repeating CMP,  TTE ordered  Bladder scan   8/24   cardiac bx cancelled   elev creat  to 2.74   cardio renal cslt called,    + CDIF   inc vanco to 500 q6   ID following  8/25 VSS: RHC today as per transplant team; transfuse 2 units PRBC today as per Dr. Viramontes; continue vanco for cdiff; transfer patient to CTU after cath today   8/26. Dieretic challenge with good response   8/27: Downgraded to the floor then upgraded to the CTU again for concerning of abd distention   8/28 CT ABD & Pelvis reveals pancolitis  8/30: repeat CT scan of abd, gen surgery called to re-evaluated pt.  8/31: RUE duplex negative for DVT  9/4 NGT clamped, minimal residual. NGT removed. started sips     Today: WBC increasing, monitor for now. anxious started on precedex. NGT clamped then moved started on sips     REVIEW OF SYSTEMS:  Gen: No fever  EYES/ENT: No visual changes;  No vertigo or throat pain   NECK: No pain   RES:  No shortness of breath or Cough  CARD: No chest pain   GI: No abdominal pain  : No dysuria  NEURO: No weakness  SKIN: No itching, rashes     ICU Vital Signs Last 24 Hrs  T(C): 36.2 (04 Sep 2020 08:00), Max: 36.4 (03 Sep 2020 12:00)  T(F): 97.2 (04 Sep 2020 08:00), Max: 97.6 (03 Sep 2020 20:00)  HR: 105 (04 Sep 2020 11:00) (101 - 114)  BP: 156/70 (04 Sep 2020 11:00) (112/84 - 168/95)  BP(mean): 100 (04 Sep 2020 11:00) (65 - 118)  ABP: --  ABP(mean): --  RR: 22 (04 Sep 2020 11:00) (11 - 53)  SpO2: 100% (04 Sep 2020 11:00) (95% - 100%)    Physical Exam:  Gen:  Awake, alert, lethargic at times    CNS: non focal 	  Neck: no JVD  RES : clear , no wheezing              CVS: Sinus Tachycardia. Normal S1/S2  Abd: soft, non-tender, distended. Bowel sounds present but soft.   Skin: No rash.  Ext:  +1 pedal edema    ============================I/O===========================   I&O's Detail    03 Sep 2020 07:01  -  04 Sep 2020 07:00  --------------------------------------------------------  IN:    Enteral Tube Flush: 100 mL    fat emulsion (Fish Oil and Plant Based) 20% Infusion: 1061.6 mL    fat emulsion (Fish Oil and Plant Based) 20% Infusion: 612 mL    insulin regular Infusion: 15 mL    Solution: 44.1 mL    Solution: 300 mL  Total IN: 2132.7 mL    OUT:    Indwelling Catheter - Urethral: 2810 mL    Nasoenteral Tube: 225 mL  Total OUT: 3035 mL    Total NET: -902.3 mL      04 Sep 2020 07:01  -  04 Sep 2020 11:09  --------------------------------------------------------  IN:    Enteral Tube Flush: 40 mL    fat emulsion (Fish Oil and Plant Based) 20% Infusion: 232 mL    insulin regular Infusion: 12 mL    Solution: 8.4 mL  Total IN: 292.4 mL    OUT:    Indwelling Catheter - Urethral: 405 mL  Total OUT: 405 mL    Total NET: -112.6 mL    ============================ LABS =========================                        8.3    21.16 )-----------( 171      ( 04 Sep 2020 00:35 )             25.8     09-04    150<H>  |  115<H>  |  90<H>  ----------------------------<  214<H>  3.8   |  26  |  1.61<H>    Ca    8.0<L>      04 Sep 2020 00:36  Phos  3.4     09-03  Mg     2.2     09-04    TPro  7.3  /  Alb  1.8<L>  /  TBili  1.0  /  DBili  x   /  AST  59<H>  /  ALT  24  /  AlkPhos  91  09-04    LIVER FUNCTIONS - ( 04 Sep 2020 00:36 )  Alb: 1.8 g/dL / Pro: 7.3 g/dL / ALK PHOS: 91 U/L / ALT: 24 U/L / AST: 59 U/L / GGT: x           PT/INR - ( 03 Sep 2020 18:34 )   PT: 14.4 sec;   INR: 1.22 ratio      PTT - ( 03 Sep 2020 18:34 )  PTT:36.7 sec    ======================Micro/Rad/Cardio=================  Culture: Reviewed   CXR: Reviewed  Echo: Reviewed  ======================================================  PAST MEDICAL & SURGICAL HISTORY:  H/O hemolytic anemia  H/O autoimmune hemolytic anemia  Knee pain, right  HLD (hyperlipidemia)  Former smoker  DVT of upper extremity (deep vein thrombosis)  Hepatitis C virus  GIB (gastrointestinal bleeding)  Ventricular fibrillation: s/p AICD  PAF (paroxysmal atrial fibrillation): on xarelto  Non-Ischemic Cardiomyopathy  SVT (Supraventricular Tachycardia)  HTN  CHF (Congestive Heart Failure)  S/P right heart catheterization: biopsy multiple  H/O heart transplant: 2/2018  Status post left hip replacement  History of Prior Ablation Treatment: for afib  AICD (Automatic Cardioverter/Defibrillator) Present: [employer] with 1 [employer] lead4/1/09- explanted and replaced with Medtronic 2 leads on 9/2/09    ====================ASSESSMENT ==============  Heart transplant 2/2018 for ACC/AHA stage D NICM   Resolved GI Bleed, Melena s/p EGD  Acute respiratory failure, resolved  Supraventricular tachycardia  Severe hypertension  Hyperlactatemia acidosis, resolved  Leukopenia- resolved  Acute blood loss anemia, stable   CKD Stage III  C. diff diarrhea  Klebsiella oxytoca bacteremia  Sepsis  Azotemia 2/2 CKD and Steroids   Pancolitis    Plan:  ====================== NEUROLOGY=====================  Nonfocal, continue to monitor neuro status   Continue PT, out of bed to chair as tolerated  started on low dose precedex for anxiety      ==================== RESPIRATORY======================   On supplemental O2 therapy via 2L NC, wean off as tolerated   Encourage incentive spirometry, continue pulse ox monitoring, follow ABGs     ====================CARDIOVASCULAR==================  Heart transplant 2/2018 for ACC/AHA stage D NICM, now on solumedrol from prednisone   (NPO for abd distention) off everolimus. Off cellcept while on high dose steroids   Continue hemodynamic monitoring   ASA on hold for hx of GI bleed / ulceration   ECHO to be done, per HF team     methylPREDNISolone sodium succinate Injectable 16 milliGRAM(s) IV Push <User Schedule> now switched to 8 for tomm     ===================HEMATOLOGIC/ONC ===================  Anemia s/p multiple blood transfusions  Monitor H&H and transfuse prn   GI Bleeding ruled out s/p EGD, found to have ulcers. F/u heme recs for hx of autoimmune hemolytic anemia    Heparin for DVT prophylaxis.     heparin   Injectable 5000 Unit(s) SubCutaneous every 8 hours    ===================== RENAL =========================  NORMAN on CKD stage 3, Anasarca. Lasix PRN  Continue to monitor I/Os, BUN/Cr, and urine output.   Monitor and replete electrolytes prn     ==================== GASTROINTESTINAL===================  Ileus present in setting of Cdiff.- NGT clamped today for 4 hrs, no residual. NGT removed, stared on sips, c/w TPN for now     CT A/P on 8/30 with Pancolitis and small volume abdominopelvic ascites, not significantly changed since 8/20/2020.  General surgery following-continue serial abdominal exams. No surgical intervention at this time   Continue Protonix for stress ulcer prophylaxis.    fat emulsion (Fish Oil and Plant Based) 20% Infusion 20.8 mL/Hr (20.8 mL/Hr) IV Continuous <Continuous>  pantoprazole  Injectable 40 milliGRAM(s) IV Push two times a day  Parenteral Nutrition - Adult 1 Each (58 mL/Hr) TPN Continuous <Continuous>    =======================    ENDOCRINE  =====================  Glucose control with Humalog sliding scale for stress hyperglycemia     insulin regular Infusion 1 Unit(s)/Hr (1 mL/Hr) IV Continuous <Continuous>    ========================INFECTIOUS DISEASE================  Klebsiella bacteremia from 8/13 which has since cleared  Clostridium difficile PCR positive, continue with vancomycin PO and Rectal Vanco for worsening  c/w eravacycline for CDiff   Transplant prophylaxis with Posaconazole and Atovaquone on hold duo to ileus   Monitor Fever / leukocytosis.  Dr. Lujan following, no need for addition abx at this time. Monitor for now.     vancomycin    Solution 500 milliGRAM(s) Oral every 6 hours  vancomycin  Retention Enema 500 milliGRAM(s) Rectal every 6 hours      Patient requires continuous monitoring with bedside rhythm monitoring, pulse ox monitoring, and intermittent blood gas analysis. Care plan discussed with ICU care team. Patient remained critical and at risk for life threatening decompensation.     By signing my name below, Angie GUDINO, attest that this documentation has been prepared under the direction and in the presence of Li Ohara PA.  Electronically signed: Katty Garcia, 09-04-20 @ 11:09    Lev GUDINO Vicki, personally performed the services described in this documentation. All medical record entries made by the scribe were at my direction and in my presence. I have reviewed the chart and agree that the record reflects my personal performance and is accurate and complete  Electronically signed: Li Ohara PA.

## 2020-09-04 NOTE — PROGRESS NOTE ADULT - ASSESSMENT
Mr. 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 He was treated with Rituximab and high dose steroids. He is currently admitted with symptomatic anemia with Hgb initially of 6.6 requiring blood transfusions. Evaluation was not consistent with hemolysis. EGD/enteroscopy eventually revealed chronic gastritis and small bowel ectasias.  His course was complicated by development of severe leukopenia and acute respiratory distress and profound sinus tachycardia on 8/13 in setting of Klebsiella bacteremia of unclear origin (preceded EGD). A CT scan of the chest showed a possible infiltrate. While his bacteremia was treated, he subsequently developed C diff colitis with severe abdominal distention. He also developed worsening acute on chronic renal failure, likely due to volume depletion. This has improved with volume resuscitation. He is currently volume overloaded and diuretics were started on 9/1 with good response. He has ongoing improvement in abdominal distention, but has worsening leukocytosis of unclear etiology, concerning for gut translocation of bacteria.     Plan discussed in multidisciplinary rounds with CTU team and CT surgery.

## 2020-09-05 LAB
ALBUMIN SERPL ELPH-MCNC: 1.8 G/DL — LOW (ref 3.3–5)
ALP SERPL-CCNC: 86 U/L — SIGNIFICANT CHANGE UP (ref 40–120)
ALT FLD-CCNC: 24 U/L — SIGNIFICANT CHANGE UP (ref 10–45)
ANION GAP SERPL CALC-SCNC: 9 MMOL/L — SIGNIFICANT CHANGE UP (ref 5–17)
APTT BLD: 61.1 SEC — HIGH (ref 27.5–35.5)
AST SERPL-CCNC: 53 U/L — HIGH (ref 10–40)
BILIRUB SERPL-MCNC: 1 MG/DL — SIGNIFICANT CHANGE UP (ref 0.2–1.2)
BLD GP AB SCN SERPL QL: NEGATIVE — SIGNIFICANT CHANGE UP
BUN SERPL-MCNC: 94 MG/DL — HIGH (ref 7–23)
CALCIUM SERPL-MCNC: 8.2 MG/DL — LOW (ref 8.4–10.5)
CHLORIDE SERPL-SCNC: 120 MMOL/L — HIGH (ref 96–108)
CO2 SERPL-SCNC: 25 MMOL/L — SIGNIFICANT CHANGE UP (ref 22–31)
CREAT SERPL-MCNC: 1.53 MG/DL — HIGH (ref 0.5–1.3)
CYCLOSPORINE SER-MCNC: 59 NG/ML — LOW (ref 150–400)
GLUCOSE BLDC GLUCOMTR-MCNC: 108 MG/DL — HIGH (ref 70–99)
GLUCOSE BLDC GLUCOMTR-MCNC: 109 MG/DL — HIGH (ref 70–99)
GLUCOSE BLDC GLUCOMTR-MCNC: 116 MG/DL — HIGH (ref 70–99)
GLUCOSE BLDC GLUCOMTR-MCNC: 117 MG/DL — HIGH (ref 70–99)
GLUCOSE BLDC GLUCOMTR-MCNC: 123 MG/DL — HIGH (ref 70–99)
GLUCOSE BLDC GLUCOMTR-MCNC: 124 MG/DL — HIGH (ref 70–99)
GLUCOSE BLDC GLUCOMTR-MCNC: 124 MG/DL — HIGH (ref 70–99)
GLUCOSE BLDC GLUCOMTR-MCNC: 133 MG/DL — HIGH (ref 70–99)
GLUCOSE BLDC GLUCOMTR-MCNC: 142 MG/DL — HIGH (ref 70–99)
GLUCOSE BLDC GLUCOMTR-MCNC: 143 MG/DL — HIGH (ref 70–99)
GLUCOSE BLDC GLUCOMTR-MCNC: 145 MG/DL — HIGH (ref 70–99)
GLUCOSE BLDC GLUCOMTR-MCNC: 151 MG/DL — HIGH (ref 70–99)
GLUCOSE BLDC GLUCOMTR-MCNC: 151 MG/DL — HIGH (ref 70–99)
GLUCOSE BLDC GLUCOMTR-MCNC: 156 MG/DL — HIGH (ref 70–99)
GLUCOSE BLDC GLUCOMTR-MCNC: 169 MG/DL — HIGH (ref 70–99)
GLUCOSE BLDC GLUCOMTR-MCNC: 171 MG/DL — HIGH (ref 70–99)
GLUCOSE BLDC GLUCOMTR-MCNC: 173 MG/DL — HIGH (ref 70–99)
GLUCOSE BLDC GLUCOMTR-MCNC: 176 MG/DL — HIGH (ref 70–99)
GLUCOSE BLDC GLUCOMTR-MCNC: 182 MG/DL — HIGH (ref 70–99)
GLUCOSE BLDC GLUCOMTR-MCNC: 186 MG/DL — HIGH (ref 70–99)
GLUCOSE BLDC GLUCOMTR-MCNC: 194 MG/DL — HIGH (ref 70–99)
GLUCOSE SERPL-MCNC: 121 MG/DL — HIGH (ref 70–99)
HCT VFR BLD CALC: 23.6 % — LOW (ref 39–50)
HGB BLD-MCNC: 7.4 G/DL — LOW (ref 13–17)
INR BLD: 1.13 RATIO — SIGNIFICANT CHANGE UP (ref 0.88–1.16)
MAGNESIUM SERPL-MCNC: 2.1 MG/DL — SIGNIFICANT CHANGE UP (ref 1.6–2.6)
MCHC RBC-ENTMCNC: 30.7 PG — SIGNIFICANT CHANGE UP (ref 27–34)
MCHC RBC-ENTMCNC: 31.4 GM/DL — LOW (ref 32–36)
MCV RBC AUTO: 97.9 FL — SIGNIFICANT CHANGE UP (ref 80–100)
NRBC # BLD: 9 /100 WBCS — HIGH (ref 0–0)
PHOSPHATE SERPL-MCNC: 3.5 MG/DL — SIGNIFICANT CHANGE UP (ref 2.5–4.5)
PLATELET # BLD AUTO: 143 K/UL — LOW (ref 150–400)
POTASSIUM SERPL-MCNC: 3.6 MMOL/L — SIGNIFICANT CHANGE UP (ref 3.5–5.3)
POTASSIUM SERPL-SCNC: 3.6 MMOL/L — SIGNIFICANT CHANGE UP (ref 3.5–5.3)
PROT SERPL-MCNC: 6.4 G/DL — SIGNIFICANT CHANGE UP (ref 6–8.3)
PROTHROM AB SERPL-ACNC: 13.4 SEC — SIGNIFICANT CHANGE UP (ref 10.6–13.6)
RBC # BLD: 2.41 M/UL — LOW (ref 4.2–5.8)
RBC # FLD: 21.5 % — HIGH (ref 10.3–14.5)
RH IG SCN BLD-IMP: POSITIVE — SIGNIFICANT CHANGE UP
SODIUM SERPL-SCNC: 154 MMOL/L — HIGH (ref 135–145)
TRIGL SERPL-MCNC: 144 MG/DL — SIGNIFICANT CHANGE UP (ref 10–149)
WBC # BLD: 20.53 K/UL — HIGH (ref 3.8–10.5)
WBC # FLD AUTO: 20.53 K/UL — HIGH (ref 3.8–10.5)

## 2020-09-05 PROCEDURE — 99291 CRITICAL CARE FIRST HOUR: CPT

## 2020-09-05 PROCEDURE — 71045 X-RAY EXAM CHEST 1 VIEW: CPT | Mod: 26

## 2020-09-05 PROCEDURE — 99231 SBSQ HOSP IP/OBS SF/LOW 25: CPT

## 2020-09-05 PROCEDURE — 99232 SBSQ HOSP IP/OBS MODERATE 35: CPT

## 2020-09-05 RX ORDER — ELECTROLYTE SOLUTION,INJ
1 VIAL (ML) INTRAVENOUS
Refills: 0 | Status: DISCONTINUED | OUTPATIENT
Start: 2020-09-05 | End: 2020-09-05

## 2020-09-05 RX ORDER — HYDRALAZINE HCL 50 MG
5 TABLET ORAL ONCE
Refills: 0 | Status: COMPLETED | OUTPATIENT
Start: 2020-09-05 | End: 2020-09-05

## 2020-09-05 RX ORDER — POTASSIUM CHLORIDE 20 MEQ
10 PACKET (EA) ORAL ONCE
Refills: 0 | Status: COMPLETED | OUTPATIENT
Start: 2020-09-05 | End: 2020-09-05

## 2020-09-05 RX ORDER — HYDRALAZINE HCL 50 MG
5 TABLET ORAL ONCE
Refills: 0 | Status: DISCONTINUED | OUTPATIENT
Start: 2020-09-05 | End: 2020-09-05

## 2020-09-05 RX ADMIN — Medication 500 MILLIGRAM(S): at 12:42

## 2020-09-05 RX ADMIN — Medication 500 MILLIGRAM(S): at 19:41

## 2020-09-05 RX ADMIN — Medication 1 EACH: at 17:29

## 2020-09-05 RX ADMIN — PANTOPRAZOLE SODIUM 40 MILLIGRAM(S): 20 TABLET, DELAYED RELEASE ORAL at 05:00

## 2020-09-05 RX ADMIN — Medication 500 MILLIGRAM(S): at 07:52

## 2020-09-05 RX ADMIN — Medication 500 MILLIGRAM(S): at 00:37

## 2020-09-05 RX ADMIN — HEPARIN SODIUM 5000 UNIT(S): 5000 INJECTION INTRAVENOUS; SUBCUTANEOUS at 05:04

## 2020-09-05 RX ADMIN — HEPARIN SODIUM 5000 UNIT(S): 5000 INJECTION INTRAVENOUS; SUBCUTANEOUS at 23:01

## 2020-09-05 RX ADMIN — HEPARIN SODIUM 5000 UNIT(S): 5000 INJECTION INTRAVENOUS; SUBCUTANEOUS at 13:00

## 2020-09-05 RX ADMIN — Medication 500 MILLIGRAM(S): at 13:00

## 2020-09-05 RX ADMIN — Medication 50 MILLIEQUIVALENT(S): at 06:44

## 2020-09-05 RX ADMIN — Medication 500 MILLIGRAM(S): at 02:11

## 2020-09-05 RX ADMIN — PANTOPRAZOLE SODIUM 40 MILLIGRAM(S): 20 TABLET, DELAYED RELEASE ORAL at 17:27

## 2020-09-05 RX ADMIN — CHLORHEXIDINE GLUCONATE 1 APPLICATION(S): 213 SOLUTION TOPICAL at 05:04

## 2020-09-05 RX ADMIN — Medication 500 MILLIGRAM(S): at 05:00

## 2020-09-05 RX ADMIN — Medication 5 MILLIGRAM(S): at 13:22

## 2020-09-05 RX ADMIN — Medication 500 MILLIGRAM(S): at 20:50

## 2020-09-05 RX ADMIN — Medication 8 MILLIGRAM(S): at 07:52

## 2020-09-05 RX ADMIN — CYCLOSPORINE 2.09 MILLIGRAM(S): 100 CAPSULE ORAL at 18:40

## 2020-09-05 NOTE — PROGRESS NOTE ADULT - SUBJECTIVE AND OBJECTIVE BOX
Follow Up:  severe C diff    Interval History: afebrile overnight. diarrhea improving. denies significant abdominal pain today     REVIEW OF SYSTEMS  [  ] ROS unobtainable because:    [ x ] All other systems negative except as noted below    Constitutional:  [ ] fever [ ] chills  [ ] weight loss  [ ] weakness  Skin:  [ ] rash [ ] phlebitis	  Eyes: [ ] icterus [ ] pain  [ ] discharge	  ENMT: [ ] sore throat  [ ] thrush [ ] ulcers [ ] exudates  Respiratory: [ ] dyspnea [ ] hemoptysis [ ] cough [ ] sputum	  Cardiovascular:  [ ] chest pain [ ] palpitations [ ] edema	  Gastrointestinal:  [ ] nausea [ ] vomiting [ x] diarrhea [ ] constipation [x ] pain	  Genitourinary:  [ ] dysuria [ ] frequency [ ] hematuria [ ] discharge [ ] flank pain  [ ] incontinence  Musculoskeletal:  [ ] myalgias [ ] arthralgias [ ] arthritis  [ ] back pain  Neurological:  [ ] headache [ ] seizures  [ ] confusion/altered mental status    Allergies  No Known Allergies        ANTIMICROBIALS:  vancomycin    Solution 500 every 6 hours  vancomycin  Retention Enema 500 every 6 hours      OTHER MEDS:  MEDICATIONS  (STANDING):  cycloSPORINE  (SandIMMUNE) IVPB 10 every 24 hours  dexMEDEtomidine Infusion 0.25 <Continuous>  heparin   Injectable 5000 every 8 hours  insulin regular Infusion 1 <Continuous>  methylPREDNISolone sodium succinate Injectable 8 <User Schedule>  pantoprazole  Injectable 40 two times a day      Vital Signs Last 24 Hrs  T(C): 36.4 (05 Sep 2020 12:00), Max: 36.8 (05 Sep 2020 04:00)  T(F): 97.5 (05 Sep 2020 12:00), Max: 98.2 (05 Sep 2020 04:00)  HR: 105 (05 Sep 2020 14:00) (92 - 122)  BP: 140/87 (05 Sep 2020 14:00) (101/69 - 194/80)  BP(mean): 108 (05 Sep 2020 14:00) (81 - 136)  RR: 27 (05 Sep 2020 14:00) (13 - 37)  SpO2: 100% (05 Sep 2020 14:00) (96% - 100%)    PHYSICAL EXAMINATION:  General: Alert and Awake, NAD  HEENT: PERRL, EOMI  Neck: Supple  Cardiac: RRR, No M/R/G  Resp: CTAB, No Wh/Rh/Ra  Abdomen: NBS, Mild-Mod Distension, Nontender, No HSM, No rigidity or guarding  MSK: No LE edema. No Calf tenderness  : No diaz  Skin: No rashes or lesions. Skin is warm and dry to the touch.   Neuro: Alert and Awake. CN 2-12 Grossly intact. Moves all four extremities spontaneously.  Psych: Calm, Pleasant, Cooperative  Vasc: left neck CVC in place, Left arm PICC in place                          7.4    20.53 )-----------( 143      ( 05 Sep 2020 00:30 )             23.6       09-05    154<H>  |  120<H>  |  94<H>  ----------------------------<  121<H>  3.6   |  25  |  1.53<H>    Ca    8.2<L>      05 Sep 2020 00:30  Phos  3.5     09-05  Mg     2.1     09-05    TPro  6.4  /  Alb  1.8<L>  /  TBili  1.0  /  DBili  x   /  AST  53<H>  /  ALT  24  /  AlkPhos  86  09-05          MICROBIOLOGY:  v  .Blood Blood-Peripheral  09-04-20   No growth to date.  --  --      .Blood Triple Lumen BROWN  08-25-20   No Growth Final  --  --      .Blood Blood  08-24-20   No Growth Final  --  --      .Stool Feces  08-23-20   GI PCR Results: NOT detected  *******Please Note:*******  GI panel PCR evaluates for:  Campylobacter, Plesiomonas shigelloides, Salmonella,  Vibrio, Yersinia enterocolitica, Enteroaggregative  Escherichia coli (EAEC), Enteropathogenic E.coli (EPEC),  Enterotoxigenic E. coli (ETEC) lt/st, Shiga-like  toxin-producing E. coli (STEC) stx1/stx2,  Shigella/ Enteroinvasive E. coli (EIEC), Cryptosporidium,  Cyclospora cayetanensis, Entamoeba histolytica,  Giardia lamblia, Adenovirus F 40/41, Astrovirus,  Norovirus GI/GII, Rotavirus A, Sapovirus  --  --      .Urine Clean Catch (Midstream)  08-14-20   No growth  --  --      .Blood Blood  08-14-20   No Growth Final  --  --      .Blood Blood-Peripheral  08-13-20   Growth in aerobic and anaerobic bottles: Klebsiella oxytoca/Raoutella  ornithinolytica  See previous culture [id number]  --    Growth in anaerobic bottle: Gram Negative Rods  Growth in aerobic bottle: Gram Negative Rods      .Blood Blood-Peripheral  08-13-20   Growth in anaerobic bottle: Klebsiella oxytoca/Raoutella ornithinolytica  "Due to technical problems, Proteus sp. will Not be reported as part of  the BCID panel until further notice"  ***Blood Panel PCR results on this specimen are available  approximately 3 hours after the Gram stain result.***  Gram stain, PCR, and/or culture results may not always  correspond due to difference in methodologies.  ************************************************************  This PCR assay was performed using[facility].  The following targets are tested for: Enterococcus,  vancomycin resistant enterococci, Listeria monocytogenes,  coagulase negative staphylococci, S. aureus,  methicillin resistant S. aureus, Streptococcus agalactiae  (Group B), S.pneumoniae, S. pyogenes (Group A),  Acinetobacter baumannii, Enterobacter cloacae, E. coli,  Klebsiella oxytoca, K. pneumoniae, Proteus sp.,  Serratia marcescens, Haemophilus influenzae,  Neisseria meningitidis, Pseudomonas aeruginosa, Candida  albicans, C. glabrata, C krusei, C parapsilosis,  C. tropicalis and the KPC resistance gene.  --  Blood Culture PCR  Klebsiella oxytoca /Raoutella ornithinolytica      .Urine Clean Catch (Midstream)  08-13-20   >=3 organisms. Probable collection contamination.  --  --    RADIOLOGY:    <The imaging below has been reviewed and visualized by me independently. Findings as detailed in report below>    EXAM:  XR CHEST PORTABLE ROUTINE 1V                       PROCEDURE DATE:  09/05/2020    Elevated right hemidiaphragm with small right pleural effusion, unchanged.

## 2020-09-05 NOTE — PROGRESS NOTE ADULT - SUBJECTIVE AND OBJECTIVE BOX
[facility] NUTRITION SUPPORT / TPN -- FOLLOW UP NOTE  --------------------------------------------------------------------------------    24 hour events/subjective:  NGT removed.  Remains NPO on TPN.  Remains on insulin drip, currently 2.5 ml/hr.  +BM    Diet:  Diet, NPO:   Except Medications (08-27-20 @ 20:17)      PAST HISTORY  --------------------------------------------------------------------------------  No significant changes to PMH, PSH, FHx, SHx, unless otherwise noted    ALLERGIES & MEDICATIONS  --------------------------------------------------------------------------------  Allergies    No Known Allergies    Intolerances      Standing Inpatient Medications  chlorhexidine 2% Cloths 1 Application(s) Topical <User Schedule>  cycloSPORINE  (SandIMMUNE) IVPB 10 milliGRAM(s) IV Intermittent every 24 hours  dexMEDEtomidine Infusion 0.25 MICROgram(s)/kG/Hr IV Continuous <Continuous>  Eravacycline (Xerava) 75 milliGRAM(s),Sodium Chloride 0.9% 150 milliLiter(s) 75 milliGRAM(s) IV Intermittent every 12 hours  heparin   Injectable 5000 Unit(s) SubCutaneous every 8 hours  insulin regular Infusion 1 Unit(s)/Hr IV Continuous <Continuous>  methylPREDNISolone sodium succinate Injectable 8 milliGRAM(s) IV Push <User Schedule>  pantoprazole  Injectable 40 milliGRAM(s) IV Push two times a day  Parenteral Nutrition - Adult 1 Each TPN Continuous <Continuous>  vancomycin    Solution 500 milliGRAM(s) Oral every 6 hours  vancomycin  Retention Enema 500 milliGRAM(s) Rectal every 6 hours    PRN Inpatient Medications  benzocaine 15 mG/menthol 3.6 mG (Sugar-Free) Lozenge 1 Lozenge Oral every 6 hours PRN      VITALS/PHYSICAL EXAM  --------------------------------------------------------------------------------  T(C): 36.4 (09-05-20 @ 08:00), Max: 36.8 (09-05-20 @ 04:00)  HR: 96 (09-05-20 @ 09:00) (92 - 122)  BP: 174/78 (09-05-20 @ 08:00) (120/59 - 194/80)  RR: 22 (09-05-20 @ 09:00) (12 - 37)  SpO2: 100% (09-05-20 @ 09:00) (96% - 100%)  Wt(kg): --        09-04-20 @ 07:01  -  09-05-20 @ 07:00  --------------------------------------------------------  IN: 2163.6 mL / OUT: 1990 mL / NET: 173.6 mL    09-05-20 @ 07:01  -  09-05-20 @ 09:27  --------------------------------------------------------  IN: 183.4 mL / OUT: 180 mL / NET: 3.4 mL            PHYSICAL EXAM:  GEN:  NAD, abd softly distended, NT  HEENT: NC/AT, PERRL, mucosa moist & throat clear, no trachea midline w/ neck supple  GI: (-) BS, firm, distended/ nontender (+)tympanitic   MSK:  FROM x4, no deformities  VASCULAR: Equally warm, no cyanosis, clubbing,        Lt IJ TLC &  LUE PICC w/ dressing c/d/i,         BLE mild edema equal,  R>LUE edema- soft nontender w/o cord or erythema  Neurology; no focal deficits, weakened strength & sensation grossly intact  Psych: normal affect  Skin: warm,  moist, & w/ good turgor        LABS/STUDIES  --------------------------------------------------------------------------------              7.4    20.53 >-----------<  143      [09-05-20 @ 00:30]              23.6     154  |  120  |  94  ----------------------------<  121      [09-05-20 @ 00:30]  3.6   |  25  |  1.53        Ca     8.2     [09-05-20 @ 00:30]      Mg     2.1     [09-05-20 @ 00:30]      Phos  3.5     [09-05-20 @ 00:30]    TPro  6.4  /  Alb  1.8  /  TBili  1.0  /  DBili  x   /  AST  53  /  ALT  24  /  AlkPhos  86  [09-05-20 @ 00:30]    PT/INR: PT 13.4 , INR 1.13       [09-05-20 @ 01:15]  PTT: 61.1       [09-05-20 @ 01:15]          [09-03-20 @ 18:34]    Ca ionizedBlood Gas Arterial - Calcium, Ionized: 1.21 mmoL/L (09-04-20 @ 21:12)  Blood Gas Calcium, Ionized - Venous: 1.19 mmoL/L (09-04-20 @ 14:38)    Creatinine Trend:  POC glucoseGlucose, Serum: 121 mg/dL (09-05-20 @ 00:30)  CAPILLARY BLOOD GLUCOSE  117 (05 Sep 2020 07:00)  123 (05 Sep 2020 06:00)  108 (05 Sep 2020 05:00)  132 (05 Sep 2020 04:00)  142 (05 Sep 2020 03:00)  124 (05 Sep 2020 02:00)  109 (05 Sep 2020 01:00)  125 (05 Sep 2020 00:00)  125 (04 Sep 2020 23:00)  118 (04 Sep 2020 22:00)  135 (04 Sep 2020 21:00)  142 (04 Sep 2020 20:00)  125 (04 Sep 2020 19:00)  142 (04 Sep 2020 18:00)  191 (04 Sep 2020 17:00)  186 (04 Sep 2020 16:00)  181 (04 Sep 2020 14:45)  188 (04 Sep 2020 14:00)  135 (04 Sep 2020 13:00)  133 (04 Sep 2020 12:00)  151 (04 Sep 2020 11:00)      POCT Blood Glucose.: 156 mg/dL (05 Sep 2020 08:55)  POCT Blood Glucose.: 116 mg/dL (05 Sep 2020 08:02)  POCT Blood Glucose.: 117 mg/dL (05 Sep 2020 06:51)  POCT Blood Glucose.: 123 mg/dL (05 Sep 2020 06:11)  POCT Blood Glucose.: 108 mg/dL (05 Sep 2020 05:16)  POCT Blood Glucose.: 133 mg/dL (05 Sep 2020 03:45)  POCT Blood Glucose.: 143 mg/dL (05 Sep 2020 03:07)  POCT Blood Glucose.: 124 mg/dL (05 Sep 2020 02:18)  POCT Blood Glucose.: 109 mg/dL (05 Sep 2020 01:06)  POCT Blood Glucose.: 124 mg/dL (05 Sep 2020 00:28)  POCT Blood Glucose.: 125 mg/dL (04 Sep 2020 22:55)  POCT Blood Glucose.: 118 mg/dL (04 Sep 2020 22:12)  POCT Blood Glucose.: 135 mg/dL (04 Sep 2020 21:20)  POCT Blood Glucose.: 142 mg/dL (04 Sep 2020 20:33)  POCT Blood Glucose.: >600 mg/dL (04 Sep 2020 20:29)  POCT Blood Glucose.: 598 mg/dL (04 Sep 2020 20:28)  POCT Blood Glucose.: 125 mg/dL (04 Sep 2020 19:04)  POCT Blood Glucose.: 142 mg/dL (04 Sep 2020 18:21)  POCT Blood Glucose.: 191 mg/dL (04 Sep 2020 17:03)  POCT Blood Glucose.: 186 mg/dL (04 Sep 2020 16:04)  POCT Blood Glucose.: 135 mg/dL (04 Sep 2020 13:15)  POCT Blood Glucose.: 133 mg/dL (04 Sep 2020 12:24)  POCT Blood Glucose.: 151 mg/dL (04 Sep 2020 10:43)    PrealbuminPrealbumin, Serum: 10 mg/dL (09-02-20 @ 08:12)  Prealbumin, Serum: 24 mg/dL (08-25-20 @ 15:10)    Triglycerides [facility] NUTRITION SUPPORT / TPN -- FOLLOW UP NOTE  --------------------------------------------------------------------------------    24 hour events/subjective:  NGT removed.  Remains NPO on TPN.  Remains on insulin drip.  +BM    Diet:  Diet, NPO:   Except Medications (08-27-20 @ 20:17)      PAST HISTORY  --------------------------------------------------------------------------------  No significant changes to PMH, PSH, FHx, SHx, unless otherwise noted    ALLERGIES & MEDICATIONS  --------------------------------------------------------------------------------  Allergies    No Known Allergies    Intolerances      Standing Inpatient Medications  chlorhexidine 2% Cloths 1 Application(s) Topical <User Schedule>  cycloSPORINE  (SandIMMUNE) IVPB 10 milliGRAM(s) IV Intermittent every 24 hours  dexMEDEtomidine Infusion 0.25 MICROgram(s)/kG/Hr IV Continuous <Continuous>  Eravacycline (Xerava) 75 milliGRAM(s),Sodium Chloride 0.9% 150 milliLiter(s) 75 milliGRAM(s) IV Intermittent every 12 hours  heparin   Injectable 5000 Unit(s) SubCutaneous every 8 hours  insulin regular Infusion 1 Unit(s)/Hr IV Continuous <Continuous>  methylPREDNISolone sodium succinate Injectable 8 milliGRAM(s) IV Push <User Schedule>  pantoprazole  Injectable 40 milliGRAM(s) IV Push two times a day  Parenteral Nutrition - Adult 1 Each TPN Continuous <Continuous>  vancomycin    Solution 500 milliGRAM(s) Oral every 6 hours  vancomycin  Retention Enema 500 milliGRAM(s) Rectal every 6 hours    PRN Inpatient Medications  benzocaine 15 mG/menthol 3.6 mG (Sugar-Free) Lozenge 1 Lozenge Oral every 6 hours PRN      VITALS/PHYSICAL EXAM  --------------------------------------------------------------------------------  T(C): 36.4 (09-05-20 @ 08:00), Max: 36.8 (09-05-20 @ 04:00)  HR: 96 (09-05-20 @ 09:00) (92 - 122)  BP: 174/78 (09-05-20 @ 08:00) (120/59 - 194/80)  RR: 22 (09-05-20 @ 09:00) (12 - 37)  SpO2: 100% (09-05-20 @ 09:00) (96% - 100%)  Wt(kg): --        09-04-20 @ 07:01  -  09-05-20 @ 07:00  --------------------------------------------------------  IN: 2163.6 mL / OUT: 1990 mL / NET: 173.6 mL    09-05-20 @ 07:01  -  09-05-20 @ 09:27  --------------------------------------------------------  IN: 183.4 mL / OUT: 180 mL / NET: 3.4 mL            PHYSICAL EXAM:  GEN:  NAD, abd softly distended, NT  HEENT: NC/AT, PERRL, mucosa moist & throat clear, no trachea midline w/ neck supple  GI: (-) BS, firm, distended/ nontender (+)tympanitic   MSK:  FROM x4, no deformities  VASCULAR: Equally warm, no cyanosis, clubbing,        Lt IJ TLC &  LUE PICC w/ dressing c/d/i,         BLE mild edema equal,  R>LUE edema- soft nontender w/o cord or erythema  Neurology; no focal deficits, weakened strength & sensation grossly intact  Psych: normal affect  Skin: warm,  moist, & w/ good turgor        LABS/STUDIES  --------------------------------------------------------------------------------              7.4    20.53 >-----------<  143      [09-05-20 @ 00:30]              23.6     154  |  120  |  94  ----------------------------<  121      [09-05-20 @ 00:30]  3.6   |  25  |  1.53        Ca     8.2     [09-05-20 @ 00:30]      Mg     2.1     [09-05-20 @ 00:30]      Phos  3.5     [09-05-20 @ 00:30]    TPro  6.4  /  Alb  1.8  /  TBili  1.0  /  DBili  x   /  AST  53  /  ALT  24  /  AlkPhos  86  [09-05-20 @ 00:30]    PT/INR: PT 13.4 , INR 1.13       [09-05-20 @ 01:15]  PTT: 61.1       [09-05-20 @ 01:15]          [09-03-20 @ 18:34]    Ca ionizedBlood Gas Arterial - Calcium, Ionized: 1.21 mmoL/L (09-04-20 @ 21:12)  Blood Gas Calcium, Ionized - Venous: 1.19 mmoL/L (09-04-20 @ 14:38)    Creatinine Trend:  POC glucoseGlucose, Serum: 121 mg/dL (09-05-20 @ 00:30)  CAPILLARY BLOOD GLUCOSE  117 (05 Sep 2020 07:00)  123 (05 Sep 2020 06:00)  108 (05 Sep 2020 05:00)  132 (05 Sep 2020 04:00)  142 (05 Sep 2020 03:00)  124 (05 Sep 2020 02:00)  109 (05 Sep 2020 01:00)  125 (05 Sep 2020 00:00)  125 (04 Sep 2020 23:00)  118 (04 Sep 2020 22:00)  135 (04 Sep 2020 21:00)  142 (04 Sep 2020 20:00)  125 (04 Sep 2020 19:00)  142 (04 Sep 2020 18:00)  191 (04 Sep 2020 17:00)  186 (04 Sep 2020 16:00)  181 (04 Sep 2020 14:45)  188 (04 Sep 2020 14:00)  135 (04 Sep 2020 13:00)  133 (04 Sep 2020 12:00)  151 (04 Sep 2020 11:00)      POCT Blood Glucose.: 156 mg/dL (05 Sep 2020 08:55)  POCT Blood Glucose.: 116 mg/dL (05 Sep 2020 08:02)  POCT Blood Glucose.: 117 mg/dL (05 Sep 2020 06:51)  POCT Blood Glucose.: 123 mg/dL (05 Sep 2020 06:11)  POCT Blood Glucose.: 108 mg/dL (05 Sep 2020 05:16)  POCT Blood Glucose.: 133 mg/dL (05 Sep 2020 03:45)  POCT Blood Glucose.: 143 mg/dL (05 Sep 2020 03:07)  POCT Blood Glucose.: 124 mg/dL (05 Sep 2020 02:18)  POCT Blood Glucose.: 109 mg/dL (05 Sep 2020 01:06)  POCT Blood Glucose.: 124 mg/dL (05 Sep 2020 00:28)  POCT Blood Glucose.: 125 mg/dL (04 Sep 2020 22:55)  POCT Blood Glucose.: 118 mg/dL (04 Sep 2020 22:12)  POCT Blood Glucose.: 135 mg/dL (04 Sep 2020 21:20)  POCT Blood Glucose.: 142 mg/dL (04 Sep 2020 20:33)  POCT Blood Glucose.: >600 mg/dL (04 Sep 2020 20:29)  POCT Blood Glucose.: 598 mg/dL (04 Sep 2020 20:28)  POCT Blood Glucose.: 125 mg/dL (04 Sep 2020 19:04)  POCT Blood Glucose.: 142 mg/dL (04 Sep 2020 18:21)  POCT Blood Glucose.: 191 mg/dL (04 Sep 2020 17:03)  POCT Blood Glucose.: 186 mg/dL (04 Sep 2020 16:04)  POCT Blood Glucose.: 135 mg/dL (04 Sep 2020 13:15)  POCT Blood Glucose.: 133 mg/dL (04 Sep 2020 12:24)  POCT Blood Glucose.: 151 mg/dL (04 Sep 2020 10:43)    PrealbuminPrealbumin, Serum: 10 mg/dL (09-02-20 @ 08:12)  Prealbumin, Serum: 24 mg/dL (08-25-20 @ 15:10)    Triglycerides

## 2020-09-05 NOTE — PROGRESS NOTE ADULT - ASSESSMENT
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 s/p EGD with esophagitis and gastritis. Developed Klebsiella oxytoca bacteremia requiring transfer to CTU. Clinically improving, transferred to [facility], finished 10-day of ceftriaxone, however, developed severe C.diff colitis on Vancomycin 500mg q6h PO and IV Flagyl. He had RHC on 8/25 and found to have high filling pressure so transferred to CTU again.    #severe C.diff colitis with NORMAN- C.diff PCR detected (8/23). Repeat CT on 8/30 without evidence of toxic megacolon. Endorses feeling better. Appears same as yesterday. 2 loose Bm/day  - Leukocytosis persisting in the 20 range. Suspect is immune system recovering from immunosuppression and now reacting to Cdiff infection  - cont vancomycin 500 mg per rectum every 6 hours  - cont PO Vanco 500 q 6h   - discontinued IV flagyl (8/24-9/1)  - cont ERAVAYCLINE 1 mg/kg (750 mg)  every 12 hours for total 7 days (8/31- 9/6)  - completed IVIG x5 days (8/29-9/1)  - not eligible for fecal transplant     #leukocytosis - 2/2 ongoing c diff vs other infection.  - Followup on 9/4 blood culture   - would start abx only for positive blood cultures as abx can worsen current cdiff infection     #Klebsiella oxytoca bacteremia: resolved   Meropenem (8/14-8/17)  Cefepime (8/17-20)  Ceftriaxone (8/20-)  - Growing in blood cultures 2/2 sets on 8/13  - Repeat blood cultures x2 sets 8/14 no growth final  - s/p ceftriaxone 8/14-24   - blood cultures 8/25 NG  - Followup on 9/4 blood culture     #OI prophylaxis-  -Was previously receiving high dose steroids  -On IV cyclosporine now  -CMV viral load(8/17, 8/26): neg  -Valcyte and Bactrim d/c'ed on 8/17 due to leukopenia  -Galactomann<0.5, Fungitell<31  -Per hemoc, will taper prednisone every 7 days by 10mg  -Will consider bactrim for PCP ppx when able to tolerated PO    #Anemia- r/o GI bleeding  s/p EGD  EGD 8/17: esophagitis, acute gastritis s/p biopsy, no ulceration  On PPI 40mg daily  - cont to monitor cbc    I will continue to follow. Please feel free to contact me with any further questions.    Gianluca Loving M.D.  [facility] Division of Infectious Disease  8AM-5PM: Pager Number [phone]  After Hours (or if no response): Please contact the Infectious Diseases Office at [phone]     The above assessment and plan were discussed with Dr Adhikari

## 2020-09-05 NOTE — PROGRESS NOTE ADULT - SUBJECTIVE AND OBJECTIVE BOX
Subjective: He says that he feels well this morning and has no abdominal pain. He is not short of breath and has no cough.     Medications:  benzocaine 15 mG/menthol 3.6 mG (Sugar-Free) Lozenge 1 Lozenge Oral every 6 hours PRN  chlorhexidine 2% Cloths 1 Application(s) Topical <User Schedule>  cycloSPORINE  (SandIMMUNE) IVPB 10 milliGRAM(s) IV Intermittent every 24 hours  dexMEDEtomidine Infusion 0.25 MICROgram(s)/kG/Hr IV Continuous <Continuous>  Eravacycline (Xerava) 75 milliGRAM(s),Sodium Chloride 0.9% 150 milliLiter(s) 75 milliGRAM(s) IV Intermittent every 12 hours  heparin   Injectable 5000 Unit(s) SubCutaneous every 8 hours  hydrALAZINE Injectable 5 milliGRAM(s) IV Push once  insulin regular Infusion 1 Unit(s)/Hr IV Continuous <Continuous>  methylPREDNISolone sodium succinate Injectable 8 milliGRAM(s) IV Push <User Schedule>  pantoprazole  Injectable 40 milliGRAM(s) IV Push two times a day  Parenteral Nutrition - Adult 1 Each TPN Continuous <Continuous>  vancomycin    Solution 500 milliGRAM(s) Oral every 6 hours  vancomycin  Retention Enema 500 milliGRAM(s) Rectal every 6 hours      Physical Exam:    Vital Signs Last 24 Hrs  T(C): 36.4 (05 Sep 2020 08:00), Max: 36.8 (05 Sep 2020 04:00)  T(F): 97.5 (05 Sep 2020 08:00), Max: 98.2 (05 Sep 2020 04:00)  HR: 101 (05 Sep 2020 08:00) (92 - 122)  BP: 174/78 (05 Sep 2020 08:00) (120/59 - 194/80)  BP(mean): 112 (05 Sep 2020 08:00) (84 - 136)  RR: 15 (05 Sep 2020 08:00) (12 - 37)  SpO2: 99% (05 Sep 2020 08:00) (96% - 100%)     Daily Weight in k.4 (05 Sep 2020 00:00)    I&O's Summary    04 Sep 2020 07:01  -  05 Sep 2020 07:00  --------------------------------------------------------  IN: 2163.6 mL / OUT: 1990 mL / NET: 173.6 mL    05 Sep 2020 07:01  -  05 Sep 2020 08:44  --------------------------------------------------------  IN: 91.2 mL / OUT: 120 mL / NET: -28.8 mL    General: No distress. Comfortable.  HEENT: EOM intact.  Neck: Neck supple. JVP not elevated. No masses  Chest: Clear to auscultation bilaterally  CV: Tachycardic, but regular. Normal S1 and S2. No rubs or gallops. Radial pulses normal. 1+ edema in legs bilaterally. Right arm wrapped due to edema.   Abdomen: Soft, non-distended, non-tender  Skin: No rashes  Neurology: Alert. Sensation intact  Psych: Affect normal    Labs:                        7.4    20.53 )-----------( 143      ( 05 Sep 2020 00:30 )             23.6     -    154<H>  |  120<H>  |  94<H>  ----------------------------<  121<H>  3.6   |  25  |  1.53<H>    Ca    8.2<L>      05 Sep 2020 00:30  Phos  3.5       Mg     2.1         TPro  6.4  /  Alb  1.8<L>  /  TBili  1.0  /  DBili  x   /  AST  53<H>  /  ALT  24  /  AlkPhos  86  -05    PT/INR - ( 05 Sep 2020 01:15 )   PT: 13.4 sec;   INR: 1.13 ratio    PTT - ( 05 Sep 2020 01:15 )  PTT:61.1 sec    Lactate Dehydrogenase, Serum: 745 U/L ( @ 18:34)

## 2020-09-05 NOTE — PROGRESS NOTE ADULT - PROBLEM SELECTOR PLAN 1
Will continue medications per ID guidance. Fecal transplant not currently an option. We will not broaden antibiotics in the absence of positive cultures or other clear signs/symptoms of infection

## 2020-09-05 NOTE — PROGRESS NOTE ADULT - ASSESSMENT
A/P:  70y M with history of heart transplant in 2/2018 admitted with autoimmune hemolytic anemia and dark stools requiring transfusion. Found to have c diff and abdominal distension on 8/23.  Pt developed Ileus w/ NGT placement and remains NPO.    - continue TPN:       - advance diet as tolerated  - continue to hold lipids due to hypertriglyceridemia  - hyperglycemia - currently on insulin drip, 15U in TPN    - Strict Intake and Output- fluid overload improving  - HyperMg- improving w/ MgSO4 8mEq  - HypoPhos improving w/ 30mMol NaPhos  - Weights as per protocol  - Monitor  CMP, Mg, iCa, & Phos daily  - Central line w/ designated port for TPN, maintenance as per protocol  - Continue monitoring as per CTU w/ Surgery, will follow with you, d/w CTU team A/P:  70y M with history of heart transplant in 2/2018 admitted with autoimmune hemolytic anemia and dark stools requiring transfusion. Found to have c diff and abdominal distension on 8/23.  Pt developed Ileus, remains NPO on TPN.    - continue TPN: 135g AA, 280 dextrose, without lipids in 3000 cc total volume  - advance diet as tolerated  - continue to hold lipids due to hypertriglyceridemia.  Check TG now and in AM to help determine when to restart lipids.  - hyperglycemia - currently on insulin gtt @ 3.5ml/hr.  Will increase insulin in TPN to 40U to attempt to wean from insulin gtt.  Continue to check FS per protocol.  - hypernatremia - will increase volume of TPN to 3000cc today.  NaAce removed from formula.  - Strict Intake and Output- fluid overload improving  - HyperMg- improving w/ MgSO4 8mEq  - HypoPhos improving w/ 30mMol NaPhos  - Weights as per protocol  - Monitor CMP, Mg, iCa, & Phos daily  - Central line w/ designated port for TPN, maintenance as per protocol  - Continue monitoring as per CTU  - above discussed with SHELLY Jackson    TPN pager [phone], spectra [phone], discussed with Dr. Tariq and Dr. KELLY Hansen

## 2020-09-05 NOTE — PROGRESS NOTE ADULT - SUBJECTIVE AND OBJECTIVE BOX
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 Patient was treated w/ prednisone and Rituximab. Tacro was discontinued and patient was also transitioned to everolimus  Admitted  6/16-6/19  for hyperglycemia.  Reported to transplant team having one done black tarry stool-  instructed to  present to [facility] for hemolytic anemia. Patient has been following with Hematology outpatient.  Denies CP  N/V diarrhea SOB. (11 Aug 2020 20:08)    Surgery/Hospital Course:  8/11 Admitted to [facility] with symptomatic anemia  8/13 EGD for investigation of tarry stools  8/15 SB capsule: stomach & SB lesions, likely ulcers.  8/17 EGD/push enteroscopy w/ angioectasias/erosions in small bowel. Gastropathy and esophagitis. Ulcer seen on VCE most likely scope trauma.    8/18 VSS transferred back to floor.   8/20 VS 9.0/28.4 stable Gastric biopsy results LA Grade A esophagitis.  - Acute gastritis. Biopsies taken to r/o CMV, No ulceration seen despite finding on capsule endoscopy - likely 2/2 scope  trauma that has since resolved. Pathology pending.  8/21 VSS H/H  8.1/25.7  trending down will monitor prednisone taper 30 mg today. Procardia 120 initiated today RHC biopsy Monday.   8/22 VVS; Patient reports loose stools x 2-3 days with 1 episode today.  Stool sent for C-dif and GI PCR. Abdominal X-ray revealed: dilated loops of bowel. Abdomen distended.  Patient denies N/V. GI called to re-evaluate. Continue with current medication regimen.  Awaiting for upcoming cardiac biopsy on Monday 8/24.  8/23   vss    creat up to 2.28 ,  repeating CMP,  TTE ordered  Bladder scan   8/24   cardiac bx cancelled   elev creat  to 2.74   cardio renal cslt called,    + CDIF   inc vanco to 500 q6   ID following  8/25 VSS: RHC today as per transplant team; transfuse 2 units PRBC today as per Dr. Viramontes; continue vanco for cdiff; transfer patient to CTU after cath today   8/26. Dieretic challenge with good response   8/27: Downgraded to the floor then upgraded to the CTU again for concerning of abd distention   8/28 CT ABD & Pelvis reveals pancolitis  8/30: repeat CT scan of abd, gen surgery called to re-evaluated pt.  8/31: RUE duplex negative for DVT  9/4 NGT clamped, minimal residual. NGT removed. started sips     Today:    REVIEW OF SYSTEMS:  Gen: No fever  EYES/ENT: No visual changes;  No vertigo or throat pain   NECK: No pain   RES:  No shortness of breath or Cough  CARD: No chest pain   GI: No abdominal pain  : No dysuria  NEURO: No weakness  SKIN: No itching, rashes     ICU Vital Signs Last 24 Hrs  T(C): 36.4 (05 Sep 2020 08:00), Max: 36.8 (05 Sep 2020 04:00)  T(F): 97.5 (05 Sep 2020 08:00), Max: 98.2 (05 Sep 2020 04:00)  HR: 96 (05 Sep 2020 09:00) (92 - 122)  BP: 107/68 (05 Sep 2020 09:00) (107/68 - 194/80)  BP(mean): 85 (05 Sep 2020 09:00) (85 - 136)  ABP: --  ABP(mean): --  RR: 22 (05 Sep 2020 09:00) (12 - 37)  SpO2: 100% (05 Sep 2020 09:00) (96% - 100%)    Physical Exam:  Gen:  Awake, alert, lethargic at times    CNS: non focal 	  Neck: no JVD  RES : clear , no wheezing              CVS: Sinus Tachycardia. Normal S1/S2  Abd: soft, non-tender, distended. Bowel sounds present but soft.   Skin: No rash.  Ext:  +1 pedal edema    ============================I/O===========================   I&O's Detail    04 Sep 2020 07:01  -  05 Sep 2020 07:00  --------------------------------------------------------  IN:    dexmedetomidine Infusion: 43.2 mL    Enteral Tube Flush: 90 mL    insulin regular Infusion: 88 mL    Solution: 150 mL    Solution: 50.4 mL    TPN (Total Parenteral Nutrition): 1742 mL  Total IN: 2163.6 mL    OUT:    Indwelling Catheter - Urethral: 1920 mL    Nasoenteral Tube: 70 mL  Total OUT: 1990 mL    Total NET: 173.6 mL      05 Sep 2020 07:01  -  05 Sep 2020 09:46  --------------------------------------------------------  IN:    dexmedetomidine Infusion: 7.2 mL    insulin regular Infusion: 6 mL    Solution: 4.2 mL    TPN (Total Parenteral Nutrition): 166 mL  Total IN: 183.4 mL    OUT:    Indwelling Catheter - Urethral: 180 mL  Total OUT: 180 mL    Total NET: 3.4 mL    ============================ LABS =========================                        7.4    20.53 )-----------( 143      ( 05 Sep 2020 00:30 )             23.6     09-05    154<H>  |  120<H>  |  94<H>  ----------------------------<  121<H>  3.6   |  25  |  1.53<H>    Ca    8.2<L>      05 Sep 2020 00:30  Phos  3.5     09-05  Mg     2.1     09-05    TPro  6.4  /  Alb  1.8<L>  /  TBili  1.0  /  DBili  x   /  AST  53<H>  /  ALT  24  /  AlkPhos  86  09-05    LIVER FUNCTIONS - ( 05 Sep 2020 00:30 )  Alb: 1.8 g/dL / Pro: 6.4 g/dL / ALK PHOS: 86 U/L / ALT: 24 U/L / AST: 53 U/L / GGT: x           PT/INR - ( 05 Sep 2020 01:15 )   PT: 13.4 sec;   INR: 1.13 ratio         PTT - ( 05 Sep 2020 01:15 )  PTT:61.1 sec  ABG - ( 04 Sep 2020 21:12 )  pH, Arterial: 7.41  pH, Blood: x     /  pCO2: 41    /  pO2: 89    / HCO3: 25    / Base Excess: .9    /  SaO2: 98        ======================Micro/Rad/Cardio=================  Culture: Reviewed   CXR: Reviewed  Echo: Reviewed  ======================================================  PAST MEDICAL & SURGICAL HISTORY:  H/O hemolytic anemia  H/O autoimmune hemolytic anemia  Knee pain, right  HLD (hyperlipidemia)  Former smoker  DVT of upper extremity (deep vein thrombosis)  Hepatitis C virus  GIB (gastrointestinal bleeding)  Ventricular fibrillation: s/p AICD  PAF (paroxysmal atrial fibrillation): on xarelto  Non-Ischemic Cardiomyopathy  SVT (Supraventricular Tachycardia)  HTN  CHF (Congestive Heart Failure)  S/P right heart catheterization: biopsy multiple  H/O heart transplant: 2/2018  Status post left hip replacement  History of Prior Ablation Treatment: for afib  AICD (Automatic Cardioverter/Defibrillator) Present: [employer] with 1 [employer] lead4/1/09- explanted and replaced with Medtronic 2 leads on 9/2/09    ====================ASSESSMENT ==============  Heart transplant 2/2018 for ACC/AHA stage D NICM   Resolved GI Bleed, Melena s/p EGD  Acute respiratory failure, resolved  Supraventricular tachycardia  Severe hypertension  Hyperlactatemia acidosis, resolved  Leukopenia- resolved  Acute blood loss anemia, stable   CKD Stage III  C. diff diarrhea  Klebsiella oxytoca bacteremia  Sepsis  Azotemia 2/2 CKD and Steroids   Pancolitis    Plan:  ====================== NEUROLOGY=====================  Nonfocal, continue to monitor neurological status as luis ICU protocol. Continue PT, out of bed to chair as tolerated.  On low dose Precedex for anxiety.      dexMEDEtomidine Infusion 0.25 MICROgram(s)/kG/Hr (4.7 mL/Hr) IV Continuous <Continuous>    ==================== RESPIRATORY======================   On supplemental O2 therapy via 2L NC, wean off as tolerated   Encourage incentive spirometry, continue pulse ox monitoring, follow ABGs.     ====================CARDIOVASCULAR==================  Heart transplant 2/2018 for ACC/AHA stage D NICM, now on solumedrol from prednisone   (NPO for abd distention) off everolimus. Off cellcept while on high dose steroids.   Continue hemodynamic monitoring.   ASA on hold for hx of GI bleed / ulceration.   ECHO to be done, per HF team     methylPREDNISolone sodium succinate Injectable 8 milliGRAM(s) IV Push <User Schedule>    ===================HEMATOLOGIC/ONC ===================  Anemia s/p multiple blood transfusions. Continue closely monitoring H&H and transfuse prn.   GI Bleeding ruled out s/p EGD, found to have ulcers. F/u heme recs for hx of autoimmune hemolytic anemia.    Heparin for DVT prophylaxis.     heparin   Injectable 5000 Unit(s) SubCutaneous every 8 hours    ===================== RENAL =========================  NORMAN on CKD stage 3, Anasarca. Lasix PRN  Continue monitoring urine output, BUN/Creatinine, I/Os and replete electrolytes prn.     ==================== GASTROINTESTINAL===================  Ileus present in setting of Cdiff.- NGT clamped 9/4 for 4 hrs, no residual. NGT removed, stared on sips, c/w TPN for now.    CT A/P on 8/30 with Pancolitis and small volume abdominopelvic ascites, not significantly changed since 8/20/2020.  General surgery following-continue serial abdominal exams. No surgical intervention at this time.     Continue NPO diet.   Continue Protonix for stress ulcer prophylaxis    pantoprazole  Injectable 40 milliGRAM(s) IV Push two times a day  Parenteral Nutrition - Adult 1 Each (83 mL/Hr) TPN Continuous <Continuous>    =======================    ENDOCRINE  =====================  Glucose control with Humalog sliding scale for stress hyperglycemia     insulin regular Infusion 1 Unit(s)/Hr (1 mL/Hr) IV Continuous <Continuous>    ========================INFECTIOUS DISEASE================  Klebsiella bacteremia from 8/13 which has since cleared  Clostridium difficile PCR positive, continue with vancomycin PO and Rectal Vanco for worsening  c/w eravacycline for CDiff   Transplant prophylaxis with Posaconazole and Atovaquone on hold duo to ileus   Monitor Fever / leukocytosis.  Dr. Lujan following, no need for addition abx at this time. Monitor for now.     vancomycin    Solution 500 milliGRAM(s) Oral every 6 hours  vancomycin  Retention Enema 500 milliGRAM(s) Rectal every 6 hours      Patient requires continuous monitoring with bedside rhythm monitoring, pulse ox monitoring, and intermittent blood gas analysis. Care plan discussed with ICU care team. Patient remained critical and at risk for life threatening decompensation.     By signing my name below, Angie GUDINO, attest that this documentation has been prepared under the direction and in the presence of Manuel Lara PA.  Electronically signed: Katty Garcia, 09-05-20 @ 09:46    Marco GUDINO Paul, personally performed the services described in this documentation. All medical record entries made by the scribe were at my direction and in my presence. I have reviewed the chart and agree that the record reflects my personal performance and is accurate and complete  Electronically signed: Manuel Lara PA. 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 Patient was treated w/ prednisone and Rituximab. Tacro was discontinued and patient was also transitioned to everolimus  Admitted  6/16-6/19  for hyperglycemia.  Reported to transplant team having one done black tarry stool-  instructed to  present to [facility] for hemolytic anemia. Patient has been following with Hematology outpatient.  Denies CP  N/V diarrhea SOB. (11 Aug 2020 20:08)    Surgery/Hospital Course:  8/11 Admitted to [facility] with symptomatic anemia  8/13 EGD for investigation of tarry stools  8/15 SB capsule: stomach & SB lesions, likely ulcers.  8/17 EGD/push enteroscopy w/ angioectasias/erosions in small bowel. Gastropathy and esophagitis. Ulcer seen on VCE most likely scope trauma.    8/18 VSS transferred back to floor.   8/20 VS 9.0/28.4 stable Gastric biopsy results LA Grade A esophagitis.  - Acute gastritis. Biopsies taken to r/o CMV, No ulceration seen despite finding on capsule endoscopy - likely 2/2 scope  trauma that has since resolved. Pathology pending.  8/21 VSS H/H  8.1/25.7  trending down will monitor prednisone taper 30 mg today. Procardia 120 initiated today RHC biopsy Monday.   8/22 VVS; Patient reports loose stools x 2-3 days with 1 episode today.  Stool sent for C-dif and GI PCR. Abdominal X-ray revealed: dilated loops of bowel. Abdomen distended.  Patient denies N/V. GI called to re-evaluate. Continue with current medication regimen.  Awaiting for upcoming cardiac biopsy on Monday 8/24.  8/23   vss    creat up to 2.28 ,  repeating CMP,  TTE ordered  Bladder scan   8/24   cardiac bx cancelled   elev creat  to 2.74   cardio renal cslt called,    + CDIF   inc vanco to 500 q6   ID following  8/25 VSS: RHC today as per transplant team; transfuse 2 units PRBC today as per Dr. Viramontes; continue vanco for cdiff; transfer patient to CTU after cath today   8/26. Dieretic challenge with good response   8/27: Downgraded to the floor then upgraded to the CTU again for concerning of abd distention   8/28 CT ABD & Pelvis reveals pancolitis  8/30: repeat CT scan of abd, gen surgery called to re-evaluated pt.  8/31: RUE duplex negative for DVT  9/4 NGT clamped, minimal residual. NGT removed. started sips     Today: Pt still with abd distension though non-tender, BM yesterday, trial of PO diet to potentially transition to enteral feeding and d/c TPN    REVIEW OF SYSTEMS:  Gen: No fever  EYES/ENT: No visual changes;  No vertigo or throat pain   NECK: No pain   RES:  No shortness of breath or Cough  CARD: No chest pain   GI: No abdominal pain  : No dysuria  NEURO: No weakness  SKIN: No itching, rashes     ICU Vital Signs Last 24 Hrs  T(C): 36.4 (05 Sep 2020 08:00), Max: 36.8 (05 Sep 2020 04:00)  T(F): 97.5 (05 Sep 2020 08:00), Max: 98.2 (05 Sep 2020 04:00)  HR: 96 (05 Sep 2020 09:00) (92 - 122)  BP: 107/68 (05 Sep 2020 09:00) (107/68 - 194/80)  BP(mean): 85 (05 Sep 2020 09:00) (85 - 136)  ABP: --  ABP(mean): --  RR: 22 (05 Sep 2020 09:00) (12 - 37)  SpO2: 100% (05 Sep 2020 09:00) (96% - 100%)    Physical Exam:  Gen:  Awake, alert, lethargic at times    CNS: non focal 	  Neck: no JVD  RES : clear , no wheezing              CVS: Sinus Tachycardia. Normal S1/S2  Abd: soft, non-tender, distended. Bowel sounds present but soft.   Skin: No rash.  Ext:  +1 pedal edema    ============================I/O===========================   I&O's Detail    04 Sep 2020 07:01  -  05 Sep 2020 07:00  --------------------------------------------------------  IN:    dexmedetomidine Infusion: 43.2 mL    Enteral Tube Flush: 90 mL    insulin regular Infusion: 88 mL    Solution: 150 mL    Solution: 50.4 mL    TPN (Total Parenteral Nutrition): 1742 mL  Total IN: 2163.6 mL    OUT:    Indwelling Catheter - Urethral: 1920 mL    Nasoenteral Tube: 70 mL  Total OUT: 1990 mL    Total NET: 173.6 mL      05 Sep 2020 07:01  -  05 Sep 2020 09:46  --------------------------------------------------------  IN:    dexmedetomidine Infusion: 7.2 mL    insulin regular Infusion: 6 mL    Solution: 4.2 mL    TPN (Total Parenteral Nutrition): 166 mL  Total IN: 183.4 mL    OUT:    Indwelling Catheter - Urethral: 180 mL  Total OUT: 180 mL    Total NET: 3.4 mL    ============================ LABS =========================                        7.4    20.53 )-----------( 143      ( 05 Sep 2020 00:30 )             23.6     09-05    154<H>  |  120<H>  |  94<H>  ----------------------------<  121<H>  3.6   |  25  |  1.53<H>    Ca    8.2<L>      05 Sep 2020 00:30  Phos  3.5     09-05  Mg     2.1     09-05    TPro  6.4  /  Alb  1.8<L>  /  TBili  1.0  /  DBili  x   /  AST  53<H>  /  ALT  24  /  AlkPhos  86  09-05    LIVER FUNCTIONS - ( 05 Sep 2020 00:30 )  Alb: 1.8 g/dL / Pro: 6.4 g/dL / ALK PHOS: 86 U/L / ALT: 24 U/L / AST: 53 U/L / GGT: x           PT/INR - ( 05 Sep 2020 01:15 )   PT: 13.4 sec;   INR: 1.13 ratio         PTT - ( 05 Sep 2020 01:15 )  PTT:61.1 sec  ABG - ( 04 Sep 2020 21:12 )  pH, Arterial: 7.41  pH, Blood: x     /  pCO2: 41    /  pO2: 89    / HCO3: 25    / Base Excess: .9    /  SaO2: 98        ======================Micro/Rad/Cardio=================  Culture: Reviewed   CXR: Reviewed  Echo: Reviewed  ======================================================  PAST MEDICAL & SURGICAL HISTORY:  H/O hemolytic anemia  H/O autoimmune hemolytic anemia  Knee pain, right  HLD (hyperlipidemia)  Former smoker  DVT of upper extremity (deep vein thrombosis)  Hepatitis C virus  GIB (gastrointestinal bleeding)  Ventricular fibrillation: s/p AICD  PAF (paroxysmal atrial fibrillation): on xarelto  Non-Ischemic Cardiomyopathy  SVT (Supraventricular Tachycardia)  HTN  CHF (Congestive Heart Failure)  S/P right heart catheterization: biopsy multiple  H/O heart transplant: 2/2018  Status post left hip replacement  History of Prior Ablation Treatment: for afib  AICD (Automatic Cardioverter/Defibrillator) Present: [employer] with 1 [employer] lead4/1/09- explanted and replaced with Medtronic 2 leads on 9/2/09    ====================ASSESSMENT ==============  Heart transplant 2/2018 for ACC/AHA stage D NICM   Resolved GI Bleed, Melena s/p EGD  Acute respiratory failure, resolved  Supraventricular tachycardia  Severe hypertension  Hyperlactatemia acidosis, resolved  Leukopenia- resolved  Acute blood loss anemia, stable   CKD Stage III  C. diff diarrhea  Klebsiella oxytoca bacteremia  Sepsis  Azotemia 2/2 CKD and Steroids   Pancolitis    Plan:  ====================== NEUROLOGY=====================  Nonfocal, continue to monitor neurological status as luis ICU protocol. Continue PT, out of bed to chair as tolerated.  On low dose Precedex for anxiety.      dexMEDEtomidine Infusion 0.25 MICROgram(s)/kG/Hr (4.7 mL/Hr) IV Continuous <Continuous>    ==================== RESPIRATORY======================   On supplemental O2 therapy via 2L NC, wean off as tolerated   Encourage incentive spirometry, continue pulse ox monitoring, follow ABGs.     ====================CARDIOVASCULAR==================  Heart transplant 2/2018 for ACC/AHA stage D NICM, now on solumedrol from prednisone   (NPO for abd distention) off everolimus. Off cellcept while on high dose steroids.   Continue hemodynamic monitoring.   ASA on hold for hx of GI bleed / ulceration.     methylPREDNISolone sodium succinate Injectable 8 milliGRAM(s) IV Push <User Schedule>    ===================HEMATOLOGIC/ONC ===================  Anemia s/p multiple blood transfusions. Continue closely monitoring H&H and transfuse prn.   GI Bleeding ruled out s/p EGD, found to have ulcers. F/u heme recs for hx of autoimmune hemolytic anemia.    Heparin for DVT prophylaxis.     heparin   Injectable 5000 Unit(s) SubCutaneous every 8 hours    ===================== RENAL =========================  NORMAN on CKD stage 3, Anasarca. Lasix PRN  Continue monitoring urine output, BUN/Creatinine, I/Os and replete electrolytes prn.     ==================== GASTROINTESTINAL===================  Ileus present in setting of Cdiff.- NGT clamped 9/4 for 4 hrs, no residual. NGT removed, stared on sips, c/w TPN for now.    CT A/P on 8/30 with Pancolitis and small volume abdominopelvic ascites, not significantly changed since 8/20/2020.  General surgery following-continue serial abdominal exams. No surgical intervention at this time.     Continue NPO diet.   Continue Protonix for stress ulcer prophylaxis    pantoprazole  Injectable 40 milliGRAM(s) IV Push two times a day  Parenteral Nutrition - Adult 1 Each (83 mL/Hr) TPN Continuous <Continuous>    =======================    ENDOCRINE  =====================  Glucose control with Humalog sliding scale for stress hyperglycemia     insulin regular Infusion 1 Unit(s)/Hr (1 mL/Hr) IV Continuous <Continuous>    ========================INFECTIOUS DISEASE================  Klebsiella bacteremia from 8/13 which has since cleared  Clostridium difficile PCR positive, continue with vancomycin PO and Rectal Vanco for worsening  c/w eravacycline for CDiff   Transplant prophylaxis with Posaconazole and Atovaquone on hold duo to ileus   Monitor Fever / leukocytosis.  Dr. Lujan following, no need for addition abx at this time. Monitor for now.     vancomycin    Solution 500 milliGRAM(s) Oral every 6 hours  vancomycin  Retention Enema 500 milliGRAM(s) Rectal every 6 hours      Patient requires continuous monitoring with bedside rhythm monitoring, pulse ox monitoring, and intermittent blood gas analysis. Care plan discussed with ICU care team. Patient remained critical and at risk for life threatening decompensation.     By signing my name below, I, Angie Mg, attest that this documentation has been prepared under the direction and in the presence of Manuel Lara PA.  Electronically signed: Ab Garcia, 09-05-20 @ 09:46    I, Manuel Lara, personally performed the services described in this documentation. All medical record entries made by the ab were at my direction and in my presence. I have reviewed the chart and agree that the record reflects my personal performance and is accurate and complete  Electronically signed: Manuel Lara PA.

## 2020-09-06 LAB
ALBUMIN SERPL ELPH-MCNC: 1.8 G/DL — LOW (ref 3.3–5)
ALP SERPL-CCNC: 96 U/L — SIGNIFICANT CHANGE UP (ref 40–120)
ALT FLD-CCNC: 31 U/L — SIGNIFICANT CHANGE UP (ref 10–45)
ANION GAP SERPL CALC-SCNC: 8 MMOL/L — SIGNIFICANT CHANGE UP (ref 5–17)
ANION GAP SERPL CALC-SCNC: 9 MMOL/L — SIGNIFICANT CHANGE UP (ref 5–17)
APTT BLD: 49.2 SEC — HIGH (ref 27.5–35.5)
AST SERPL-CCNC: 56 U/L — HIGH (ref 10–40)
BILIRUB SERPL-MCNC: 1 MG/DL — SIGNIFICANT CHANGE UP (ref 0.2–1.2)
BUN SERPL-MCNC: 78 MG/DL — HIGH (ref 7–23)
BUN SERPL-MCNC: 83 MG/DL — HIGH (ref 7–23)
CALCIUM SERPL-MCNC: 7.9 MG/DL — LOW (ref 8.4–10.5)
CALCIUM SERPL-MCNC: 7.9 MG/DL — LOW (ref 8.4–10.5)
CHLORIDE SERPL-SCNC: 104 MMOL/L — SIGNIFICANT CHANGE UP (ref 96–108)
CHLORIDE SERPL-SCNC: 118 MMOL/L — HIGH (ref 96–108)
CO2 SERPL-SCNC: 22 MMOL/L — SIGNIFICANT CHANGE UP (ref 22–31)
CO2 SERPL-SCNC: 22 MMOL/L — SIGNIFICANT CHANGE UP (ref 22–31)
CREAT SERPL-MCNC: 1.11 MG/DL — SIGNIFICANT CHANGE UP (ref 0.5–1.3)
CREAT SERPL-MCNC: 1.18 MG/DL — SIGNIFICANT CHANGE UP (ref 0.5–1.3)
CYCLOSPORINE SER-MCNC: 58 NG/ML — LOW (ref 150–400)
CYCLOSPORINE SER-MCNC: 67 NG/ML — LOW (ref 150–400)
GAS PNL BLDA: SIGNIFICANT CHANGE UP
GLUCOSE BLDC GLUCOMTR-MCNC: 108 MG/DL — HIGH (ref 70–99)
GLUCOSE BLDC GLUCOMTR-MCNC: 113 MG/DL — HIGH (ref 70–99)
GLUCOSE BLDC GLUCOMTR-MCNC: 116 MG/DL — HIGH (ref 70–99)
GLUCOSE BLDC GLUCOMTR-MCNC: 123 MG/DL — HIGH (ref 70–99)
GLUCOSE BLDC GLUCOMTR-MCNC: 125 MG/DL — HIGH (ref 70–99)
GLUCOSE BLDC GLUCOMTR-MCNC: 129 MG/DL — HIGH (ref 70–99)
GLUCOSE BLDC GLUCOMTR-MCNC: 130 MG/DL — HIGH (ref 70–99)
GLUCOSE BLDC GLUCOMTR-MCNC: 132 MG/DL — HIGH (ref 70–99)
GLUCOSE BLDC GLUCOMTR-MCNC: 137 MG/DL — HIGH (ref 70–99)
GLUCOSE BLDC GLUCOMTR-MCNC: 139 MG/DL — HIGH (ref 70–99)
GLUCOSE BLDC GLUCOMTR-MCNC: 152 MG/DL — HIGH (ref 70–99)
GLUCOSE BLDC GLUCOMTR-MCNC: 185 MG/DL — HIGH (ref 70–99)
GLUCOSE BLDC GLUCOMTR-MCNC: 208 MG/DL — HIGH (ref 70–99)
GLUCOSE BLDC GLUCOMTR-MCNC: 92 MG/DL — SIGNIFICANT CHANGE UP (ref 70–99)
GLUCOSE BLDC GLUCOMTR-MCNC: 99 MG/DL — SIGNIFICANT CHANGE UP (ref 70–99)
GLUCOSE SERPL-MCNC: 169 MG/DL — HIGH (ref 70–99)
GLUCOSE SERPL-MCNC: 930 MG/DL — CRITICAL HIGH (ref 70–99)
HCT VFR BLD CALC: 21.5 % — LOW (ref 39–50)
HCT VFR BLD CALC: 23.8 % — LOW (ref 39–50)
HGB BLD-MCNC: 6.3 G/DL — CRITICAL LOW (ref 13–17)
HGB BLD-MCNC: 7.3 G/DL — LOW (ref 13–17)
INR BLD: 1.06 RATIO — SIGNIFICANT CHANGE UP (ref 0.88–1.16)
MAGNESIUM SERPL-MCNC: 1.8 MG/DL — SIGNIFICANT CHANGE UP (ref 1.6–2.6)
MAGNESIUM SERPL-MCNC: 2 MG/DL — SIGNIFICANT CHANGE UP (ref 1.6–2.6)
MCHC RBC-ENTMCNC: 29.3 GM/DL — LOW (ref 32–36)
MCHC RBC-ENTMCNC: 30.5 PG — SIGNIFICANT CHANGE UP (ref 27–34)
MCHC RBC-ENTMCNC: 30.7 GM/DL — LOW (ref 32–36)
MCHC RBC-ENTMCNC: 30.9 PG — SIGNIFICANT CHANGE UP (ref 27–34)
MCV RBC AUTO: 105.4 FL — HIGH (ref 80–100)
MCV RBC AUTO: 99.6 FL — SIGNIFICANT CHANGE UP (ref 80–100)
NRBC # BLD: 8 /100 WBCS — HIGH (ref 0–0)
NRBC # BLD: 8 /100 WBCS — HIGH (ref 0–0)
PHOSPHATE SERPL-MCNC: 2.9 MG/DL — SIGNIFICANT CHANGE UP (ref 2.5–4.5)
PLATELET # BLD AUTO: 118 K/UL — LOW (ref 150–400)
PLATELET # BLD AUTO: 136 K/UL — LOW (ref 150–400)
POTASSIUM SERPL-MCNC: 3.7 MMOL/L — SIGNIFICANT CHANGE UP (ref 3.5–5.3)
POTASSIUM SERPL-MCNC: 5.6 MMOL/L — HIGH (ref 3.5–5.3)
POTASSIUM SERPL-SCNC: 3.7 MMOL/L — SIGNIFICANT CHANGE UP (ref 3.5–5.3)
POTASSIUM SERPL-SCNC: 5.6 MMOL/L — HIGH (ref 3.5–5.3)
PROT SERPL-MCNC: 6 G/DL — SIGNIFICANT CHANGE UP (ref 6–8.3)
PROTHROM AB SERPL-ACNC: 12.6 SEC — SIGNIFICANT CHANGE UP (ref 10.6–13.6)
RBC # BLD: 2.04 M/UL — LOW (ref 4.2–5.8)
RBC # BLD: 2.39 M/UL — LOW (ref 4.2–5.8)
RBC # FLD: 22.7 % — HIGH (ref 10.3–14.5)
RBC # FLD: 23.6 % — HIGH (ref 10.3–14.5)
SODIUM SERPL-SCNC: 134 MMOL/L — LOW (ref 135–145)
SODIUM SERPL-SCNC: 149 MMOL/L — HIGH (ref 135–145)
TRIGL SERPL-MCNC: 151 MG/DL — HIGH (ref 10–149)
TRIGL SERPL-MCNC: 182 MG/DL — HIGH (ref 10–149)
WBC # BLD: 15.32 K/UL — HIGH (ref 3.8–10.5)
WBC # BLD: 17.6 K/UL — HIGH (ref 3.8–10.5)
WBC # FLD AUTO: 15.32 K/UL — HIGH (ref 3.8–10.5)
WBC # FLD AUTO: 17.6 K/UL — HIGH (ref 3.8–10.5)

## 2020-09-06 PROCEDURE — 99291 CRITICAL CARE FIRST HOUR: CPT

## 2020-09-06 PROCEDURE — 71045 X-RAY EXAM CHEST 1 VIEW: CPT | Mod: 26,77

## 2020-09-06 PROCEDURE — 71045 X-RAY EXAM CHEST 1 VIEW: CPT | Mod: 26

## 2020-09-06 PROCEDURE — 99231 SBSQ HOSP IP/OBS SF/LOW 25: CPT

## 2020-09-06 RX ORDER — POTASSIUM CHLORIDE 20 MEQ
10 PACKET (EA) ORAL
Refills: 0 | Status: COMPLETED | OUTPATIENT
Start: 2020-09-06 | End: 2020-09-06

## 2020-09-06 RX ORDER — FUROSEMIDE 40 MG
40 TABLET ORAL ONCE
Refills: 0 | Status: COMPLETED | OUTPATIENT
Start: 2020-09-06 | End: 2020-09-06

## 2020-09-06 RX ORDER — HYDRALAZINE HCL 50 MG
25 TABLET ORAL EVERY 8 HOURS
Refills: 0 | Status: DISCONTINUED | OUTPATIENT
Start: 2020-09-06 | End: 2020-09-06

## 2020-09-06 RX ORDER — ELECTROLYTE SOLUTION,INJ
1 VIAL (ML) INTRAVENOUS
Refills: 0 | Status: DISCONTINUED | OUTPATIENT
Start: 2020-09-06 | End: 2020-09-06

## 2020-09-06 RX ORDER — HYDRALAZINE HCL 50 MG
50 TABLET ORAL EVERY 8 HOURS
Refills: 0 | Status: DISCONTINUED | OUTPATIENT
Start: 2020-09-06 | End: 2020-09-16

## 2020-09-06 RX ORDER — FUROSEMIDE 40 MG
20 TABLET ORAL ONCE
Refills: 0 | Status: COMPLETED | OUTPATIENT
Start: 2020-09-06 | End: 2020-09-06

## 2020-09-06 RX ORDER — CYCLOSPORINE 100 MG/1
15 CAPSULE ORAL EVERY 24 HOURS
Refills: 0 | Status: DISCONTINUED | OUTPATIENT
Start: 2020-09-06 | End: 2020-09-08

## 2020-09-06 RX ORDER — I.V. FAT EMULSION 20 G/100ML
20.8 EMULSION INTRAVENOUS
Qty: 50 | Refills: 0 | Status: DISCONTINUED | OUTPATIENT
Start: 2020-09-06 | End: 2020-09-07

## 2020-09-06 RX ORDER — PANTOPRAZOLE SODIUM 20 MG/1
40 TABLET, DELAYED RELEASE ORAL DAILY
Refills: 0 | Status: DISCONTINUED | OUTPATIENT
Start: 2020-09-07 | End: 2020-09-15

## 2020-09-06 RX ADMIN — Medication 500 MILLIGRAM(S): at 11:58

## 2020-09-06 RX ADMIN — Medication 40 MILLIGRAM(S): at 17:20

## 2020-09-06 RX ADMIN — INSULIN HUMAN 1 UNIT(S)/HR: 100 INJECTION, SOLUTION SUBCUTANEOUS at 20:00

## 2020-09-06 RX ADMIN — Medication 50 MILLIEQUIVALENT(S): at 10:09

## 2020-09-06 RX ADMIN — Medication 1 EACH: at 20:00

## 2020-09-06 RX ADMIN — CYCLOSPORINE 2.1 MILLIGRAM(S): 100 CAPSULE ORAL at 13:14

## 2020-09-06 RX ADMIN — Medication 50 MILLIGRAM(S): at 21:59

## 2020-09-06 RX ADMIN — DEXMEDETOMIDINE HYDROCHLORIDE IN 0.9% SODIUM CHLORIDE 4.7 MICROGRAM(S)/KG/HR: 4 INJECTION INTRAVENOUS at 21:59

## 2020-09-06 RX ADMIN — HEPARIN SODIUM 5000 UNIT(S): 5000 INJECTION INTRAVENOUS; SUBCUTANEOUS at 14:19

## 2020-09-06 RX ADMIN — CHLORHEXIDINE GLUCONATE 1 APPLICATION(S): 213 SOLUTION TOPICAL at 04:59

## 2020-09-06 RX ADMIN — Medication 500 MILLIGRAM(S): at 03:32

## 2020-09-06 RX ADMIN — Medication 50 MILLIEQUIVALENT(S): at 11:49

## 2020-09-06 RX ADMIN — Medication 20 MILLIGRAM(S): at 23:34

## 2020-09-06 RX ADMIN — Medication 500 MILLIGRAM(S): at 08:00

## 2020-09-06 RX ADMIN — Medication 500 MILLIGRAM(S): at 17:21

## 2020-09-06 RX ADMIN — Medication 40 MILLIGRAM(S): at 07:59

## 2020-09-06 RX ADMIN — HEPARIN SODIUM 5000 UNIT(S): 5000 INJECTION INTRAVENOUS; SUBCUTANEOUS at 05:00

## 2020-09-06 RX ADMIN — Medication 8 MILLIGRAM(S): at 07:59

## 2020-09-06 RX ADMIN — PANTOPRAZOLE SODIUM 40 MILLIGRAM(S): 20 TABLET, DELAYED RELEASE ORAL at 05:03

## 2020-09-06 RX ADMIN — Medication 500 MILLIGRAM(S): at 02:32

## 2020-09-06 RX ADMIN — Medication 500 MILLIGRAM(S): at 20:09

## 2020-09-06 RX ADMIN — Medication 25 MILLIGRAM(S): at 08:50

## 2020-09-06 RX ADMIN — Medication 500 MILLIGRAM(S): at 16:15

## 2020-09-06 RX ADMIN — I.V. FAT EMULSION 20.8 ML/HR: 20 EMULSION INTRAVENOUS at 20:00

## 2020-09-06 RX ADMIN — HEPARIN SODIUM 5000 UNIT(S): 5000 INJECTION INTRAVENOUS; SUBCUTANEOUS at 21:59

## 2020-09-06 RX ADMIN — Medication 500 MILLIGRAM(S): at 05:01

## 2020-09-06 RX ADMIN — Medication 50 MILLIGRAM(S): at 16:15

## 2020-09-06 NOTE — CHART NOTE - NSCHARTNOTEFT_GEN_A_CORE
Nutrition Follow Up Note     Patient seen for: nutrition/TPN follow up     Source: medical record, TPN Team rounds. Pt observed asleep.     Chart reviewed, events noted.  70 year old male with PMHx of NICM, s/p HM2 LVAD and OHT in 2/2018, with known coronary fistula. Recent admission for hemolytic anemia, now admitted for hyperglycemia and GIB. EGD and capsule study negative. Pt now C-Diff colitis w/o toxic megacolon.    Nutrition Status:   - Severe malnutrition; TPN initiated 9/2.   - Diet advanced to clear liquids on 9/5; NGT discontinued.  - Hyperglycemia addressed with insulin drip (currently 6 units/hour) and 40 units of insulin in TPN  - Hypernatremia addressed with increased volume (3L) TPN, and low sodium TPN and diet order.   --> Per TPN team, consider changing diuretic from Lasix (which increases Na) to Metolazone (which decreases Na)   - Plan to reduce dextrose by half in TPN today (9/6), and reduce insulin from 40 to 30 units.  - Plan to resume lipid provision in TPN, previously on hold in setting of high triglycerides (currently 182)  - Plan to trial trophic EN, with goal of discontinuing TPN if EN is tolerated and po intake improves    Diet Order: Diet, Clear Liquid:   Low Sodium  Tube Feeding Modality: Nasogastric  Glucerna 1.5 Sohail (GLUCERNA1.5RTH)  Total Volume for 24 Hours (mL): 240  Continuous  Starting Tube Feed Rate {mL per Hour}: 10  Until Goal Tube Feed Rate (mL per Hour): 10  Tube Feed Duration (in Hours): 24  Tube Feed Start Time: 11:10 (09-06-20 @ 11:04)      Parenteral Nutrition: TPN (ordered  9/6) 3L to infuse at 125 ml/hr (135 gm amino acids, 140 gm dextrose, 50gm SMOF lipid) to provide 1516 sohail/day (20.2 sohail/kg and 1.8 gm protein/kg per dosing wt 75.2 kg)    Non-Protein Calories: 976 sohail/day (13 sohali/kg)  Dextrose Infusion Rate: 1.3 mg/kg/min  Lipid Infusion Rate: 0.66 gm/kg/day; 0.06 gm/kg/hr     Electrolytes and Insulin:   Insulin (units): 30 (reduced from 40 units, with decrease in dextrose from 280 to 140 grams)  NaCl (mEq): 0  Na acetate (mEq): 0  Na Phos (mmol): 30  KCL (mEq): 60  Calcium gluconate (mEq): 10  Mg sulfate (mEq):  8  MTE-5 concentrate (ml): 1  MVI (ml): 10     Last BM: 9/6 (liquid + flatus). Vanco enemas noted.    Anthropometric Measurements:   Height (cm): 170.18 (08-17-20 @ 14:29), 172.72 (06-16-20 @ 17:18)  Weight (kg): 75.2 (08-17-20 @ 14:29), 73 (06-16-20 @ 17:18)  Daily wt (kg): 87.4 (09-05-20)  BMI (kg/m2): 26 (08-17-20 @ 14:29), 24.5 (06-16-20 @ 17:18)  IBW: 67.3 kg    Medications: MEDICATIONS  (STANDING):  chlorhexidine 2% Cloths 1 Application(s) Topical <User Schedule>  cycloSPORINE  (SandIMMUNE) IVPB 10 milliGRAM(s) IV Intermittent every 24 hours  dexMEDEtomidine Infusion 0.25 MICROgram(s)/kG/Hr (4.7 mL/Hr) IV Continuous <Continuous>  Eravacycline (Xerava) 75 milliGRAM(s),Sodium Chloride 0.9% 150 milliLiter(s) 75 milliGRAM(s) IV Intermittent every 12 hours  fat emulsion (Fish Oil and Plant Based) 20% Infusion 20.8 mL/Hr (20.8 mL/Hr) IV Continuous <Continuous>  heparin   Injectable 5000 Unit(s) SubCutaneous every 8 hours  hydrALAZINE 50 milliGRAM(s) Oral every 8 hours  insulin regular Infusion 1 Unit(s)/Hr (1 mL/Hr) IV Continuous <Continuous>  methylPREDNISolone sodium succinate Injectable 8 milliGRAM(s) IV Push <User Schedule>  pantoprazole  Injectable 40 milliGRAM(s) IV Push two times a day  Parenteral Nutrition - Adult 1 Each (125 mL/Hr) TPN Continuous <Continuous>  Parenteral Nutrition - Adult 1 Each (125 mL/Hr) TPN Continuous <Continuous>  potassium chloride  10 mEq/50 mL IVPB 10 milliEquivalent(s) IV Intermittent every 1 hour  vancomycin    Solution 500 milliGRAM(s) Oral every 6 hours  vancomycin  Retention Enema 500 milliGRAM(s) Rectal every 6 hours    MEDICATIONS  (PRN):  benzocaine 15 mG/menthol 3.6 mG (Sugar-Free) Lozenge 1 Lozenge Oral every 6 hours PRN Sore Throat    Labs: 09-06 @ 06:34: Sodium 149<H>, Potassium 3.7, Calcium 7.9<L>, Magnesium 1.8, Phosphorus 2.9, BUN 83<H>, Creatinine 1.11, Glucose 169<H>, Total Bilirubin 1.0, Albumin, 1.8<L>  09-06 @ 05:35: Sodium 134<L>, Potassium 5.6<H>, Calcium 7.9<L>, Magnesium 2.0,  BUN 78<H>, Creatinine 1.18, Glucose 930<HH>    CBC ( 06 Sep 2020 06:34 )  WBC Count : 17.60 K/uL  RBC Count : 2.39 M/uL  Hemoglobin : 7.3 g/dL  Hematocrit : 23.8 %  Platelet Count - Automated : 136 K/uL  Mean Cell Volume : 99.6 fl  Mean Cell Hemoglobin : 30.5 pg  Mean Cell Hemoglobin Concentration :30.7 gm/dL    LIVER FUNCTIONS - ( 06 Sep 2020 06:34 )  Alb: 1.8 g/dL / Pro: 6.0 g/dL / ALK PHOS: 96 U/L / ALT: 31 U/L / AST: 56 U/L / GGT: x           Triglycerides, Serum: 182 mg/dL (09-06-20 @ 06:34)  Triglycerides, Serum: 151 mg/dL (09-06-20 @ 05:35)  Triglycerides, Serum: 144 mg/dL (09-05-20 @ 11:36)  Triglycerides, Serum: 526 mg/dL (09-04-20 @ 00:36)  Triglycerides, Serum: 358 mg/dL (09-03-20 @ 10:18)  Triglycerides, Serum: 116 mg/dL (09-02-20 @ 01:36)    Hemoglobin A1C 8.2% (6/16/20)    POCT Blood Glucose.: 152 mg/dL (09-06-20 @ 06:17)  POCT Blood Glucose.: 132 mg/dL (09-06-20 @ 02:27)  POCT Blood Glucose.: 113 mg/dL (09-06-20 @ 00:19)  POCT Blood Glucose.: 182 mg/dL (09-05-20 @ 21:48)  POCT Blood Glucose.: 194 mg/dL (09-05-20 @ 19:53)  POCT Blood Glucose.: 186 mg/dL (09-05-20 @ 18:45)  POCT Blood Glucose.: 173 mg/dL (09-05-20 @ 17:58)  POCT Blood Glucose.: 176 mg/dL (09-05-20 @ 17:26)  POCT Blood Glucose.: 171 mg/dL (09-05-20 @ 15:22)  POCT Blood Glucose.: 169 mg/dL (09-05-20 @ 14:26)  POCT Blood Glucose.: 142 mg/dL (09-05-20 @ 12:55)  POCT Blood Glucose.: 151 mg/dL (09-05-20 @ 11:58)    Skin: no pressure injuries documented  Edema: 1+ generalized, 3+ right/left foot, arm    Estimated Needs: based on dosing wt 75.2 kg with consideration for transition from TPN to EN/po diet  Energy: 4710-6035 calories (25-30 sohail/kg)   Protein: 105-135 grams (1.4-1.8 gm/kg)    Previous Nutrition Diagnosis: 1) Altered Nutrition Lab Values 2) Severe Protein Calorie Malnutrition  Nutrition Diagnosis is: ongoing, addressed with TPN and transition to EN/po diet    New Nutrition Diagnosis:  none    Recommended Interventions:   1. TPN per TPN Team/Nutrition Assessment; monitor ability to discontinue TPN when EN and po intake meet >75% of nutrition needs  2. Continue Clear Liquid, Low Sodium diet. Monitor ability to advance to Consistent Carbohydrate, Low Sodium diet  3. Trial trophic EN as medically appropriate; currently ordered as Glucerna1.5 @ 10 ml/hr x 24 hours. RD remains available for EN recommendations when advancement is appropriate.    Monitoring and Evaluation:   Continue to monitor nutrition provision and tolerance, weights, labs, skin integrity.   RD remains available upon request and will follow up per protocol.    Lilia Zavaleta, MS RD CDN [facility], #[phone]

## 2020-09-06 NOTE — PROGRESS NOTE ADULT - MINUTES
Patient with abdominal pain, pancreatitis, thickened gastric wall. She does have elevated LFTs. At this time, etiology for pancreatitis is unclear. I will recommend obtaining MRCP/MRI. Continue supportive care.   Once she is improved, she can follow up with me as outpatient for consideration for EGD.  Start PPI once daily 40

## 2020-09-06 NOTE — PROGRESS NOTE ADULT - SUBJECTIVE AND OBJECTIVE BOX
Subjective: He complains of frustration with hospitalization and not being able to drink water. He notes no abdominal pain.     Medications:  benzocaine 15 mG/menthol 3.6 mG (Sugar-Free) Lozenge 1 Lozenge Oral every 6 hours PRN  chlorhexidine 2% Cloths 1 Application(s) Topical <User Schedule>  cycloSPORINE  (SandIMMUNE) IVPB 10 milliGRAM(s) IV Intermittent every 24 hours  dexMEDEtomidine Infusion 0.25 MICROgram(s)/kG/Hr IV Continuous <Continuous>  Eravacycline (Xerava) 75 milliGRAM(s),Sodium Chloride 0.9% 150 milliLiter(s) 75 milliGRAM(s) IV Intermittent every 12 hours  furosemide   Injectable 40 milliGRAM(s) IV Push once  heparin   Injectable 5000 Unit(s) SubCutaneous every 8 hours  hydrALAZINE 25 milliGRAM(s) Oral every 8 hours  insulin regular Infusion 1 Unit(s)/Hr IV Continuous <Continuous>  methylPREDNISolone sodium succinate Injectable 8 milliGRAM(s) IV Push <User Schedule>  pantoprazole  Injectable 40 milliGRAM(s) IV Push two times a day  Parenteral Nutrition - Adult 1 Each TPN Continuous <Continuous>  vancomycin    Solution 500 milliGRAM(s) Oral every 6 hours  vancomycin  Retention Enema 500 milliGRAM(s) Rectal every 6 hours      Physical Exam:    Vitals:    Vital Signs Last 24 Hrs  T(C): 36.7 (06 Sep 2020 04:00), Max: 36.7 (06 Sep 2020 04:00)  T(F): 98.1 (06 Sep 2020 04:00), Max: 98.1 (06 Sep 2020 04:00)  HR: 98 (06 Sep 2020 07:00) (95 - 112)  BP: 154/101 (06 Sep 2020 07:00) (101/69 - 174/78)  BP(mean): 122 (06 Sep 2020 07:00) (81 - 122)  RR: 21 (06 Sep 2020 07:00) (12 - 33)  SpO2: 96% (06 Sep 2020 07:00) (95% - 100%)    I&O's Summary    05 Sep 2020 07:01  -  06 Sep 2020 07:00  --------------------------------------------------------  IN: 3074.2 mL / OUT: 1425 mL / NET: 1649.2 mL      General: No distress. Comfortable.  HEENT: EOM intact.  Neck: Neck supple. JVP not elevated. No masses  Chest: Clear to auscultation bilaterally  CV: Tachycardic, regular. Normal S1 and S2. No  gallops. Radial pulses normal. Anasarcic.   Abdomen: Distended, but softer, non-tender  Skin: No rashes  Neurology: Alert. Sensation intact  Psych: Affect frustrated    Labs:                        7.3    17.60 )-----------( 136      ( 06 Sep 2020 06:34 )             23.8         149<H>  |  118<H>  |  83<H>  ----------------------------<  169<H>  3.7   |  22  |  1.11    Ca    7.9<L>      06 Sep 2020 06:34  Phos  2.9       Mg     1.8         TPro  6.0  /  Alb  1.8<L>  /  TBili  1.0  /  DBili  x   /  AST  56<H>  /  ALT  31  /  AlkPhos  96  -06    PT/INR - ( 06 Sep 2020 06:34 )   PT: 12.6 sec;   INR: 1.06 ratio    PTT - ( 06 Sep 2020 06:34 )  PTT:49.2 sec    Lactate Dehydrogenase, Serum: 745 U/L ( @ 18:34)      Total Cholesterol: --  T  T    Cyclosporine Level: 59: CYCLOSPORINE: Assay performed by Chemiluminescent Microparticle  Immunoassay (CMIA) on the [employer] system.  All immunoassay tests  are subject to rare interferences from heterophile antibodies so that if  atypical or unexpected results are obtained the lab should be notified to  confirm this result by an alternative method before adjusting medication  dosage. The therapeutic range of 150-400 ng/mL is based on trough levels  of Cyclosporine but levels for clinical management will vary depending on  the organ transplanted, post-dose time of draw, length of time  post-transplant and the individual patient's metabolism. ng/mL (20 @ 08:46)

## 2020-09-06 NOTE — PROGRESS NOTE ADULT - ASSESSMENT
A/P:  70y M with history of heart transplant in 2/2018 admitted with autoimmune hemolytic anemia and dark stools requiring transfusion. Found to have c diff and abdominal distension on 8/23.  Pt developed Ileus.  Now tolerating clear liquid diet    - continue TPN: 135g AA, 280 dextrose, without lipids in 3000 cc total volume  - advance diet as tolerated  - continue to hold lipids due to hypertriglyceridemia.  Check TG now and in AM to help determine when to restart lipids.  - hyperglycemia - currently on insulin gtt @ 3.5ml/hr.  Will increase insulin in TPN to 40U to attempt to wean from insulin gtt.  Continue to check FS per protocol.  - hypernatremia - will increase volume of TPN to 3000cc today.  NaAce removed from formula.  - Strict Intake and Output- fluid overload improving  - HyperMg- improving w/ MgSO4 8mEq  - HypoPhos improving w/ 30mMol NaPhos  - Weights as per protocol  - Monitor CMP, Mg, iCa, & Phos daily  - Central line w/ designated port for TPN, maintenance as per protocol  - Care per CTU  - discussed with A/P:  70y M with history of heart transplant in 2/2018 admitted with autoimmune hemolytic anemia and dark stools requiring transfusion. Found to have c diff and abdominal distension on 8/23.  Pt developed Ileus.  Now tolerating clear liquid diet    - continue TPN: 135g AA, 140 dextrose, 50g lipids in 3000 cc total volume  - advance diet as tolerated  - hypertriglyceridema -  today.  50g lipids added back.  Check TG daily.  - hyperglycemia - currently on insulin gtt @ 6 ml/hr.  Will decrease CHO load in TPN from 280 to 140 today.  Insulin decreased from 40U to 30U today.  - hypernatremia - will increase volume of TPN to 3000cc.  NaAce removed from formula.  Recommend metolazone 10mg QD for diuresis instead of lasix.  - Strict Intake and Output- fluid overload improving  - HyperMg- improving w/ MgSO4 8mEq  - HypoPhos improving w/ 30mMol NaPhos  - Weights as per protocol  - Monitor CMP, Mg, iCa, & Phos daily  - Central line w/ designated port for TPN, maintenance as per protocol  - Care per CTU  - discussed with SHELLY Corado    TPN pager [phone], spectra [phone]  M-F 8A-4P, Weekends and holidays 8A-12P  discussed with Dr. Tariq and Dr. KELLY Hansen A/P:  70y M with history of heart transplant in 2/2018 admitted with autoimmune hemolytic anemia and dark stools requiring transfusion. Found to have c diff and abdominal distension on 8/23.  Pt developed Ileus.  Now tolerating clear liquid diet    - continue TPN: 135g AA, 140 dextrose, 50g lipids in 3000 cc total volume  - advance diet as tolerated  - hypertriglyceridema -  today.  50g lipids restarted today.  Check TG daily.  - hyperglycemia - currently on insulin gtt @ 6 ml/hr.  Will decrease CHO load in TPN from 280g to 140g today.  Insulin decreased from 40U to 30U today.  - hypernatremia - will increase volume of TPN to 3000cc.  NaAce removed from formula.  Recommend metolazone 10mg QD for diuresis instead of lasix.  - Strict Intake and Output- fluid overload improving  - HyperMg- improving w/ MgSO4 8mEq  - HypoPhos improving w/ 30mMol NaPhos  - Weights as per protocol  - Monitor CMP, Mg, iCa, & Phos daily  - Central line w/ designated port for TPN, maintenance as per protocol  - Care per CTU  - discussed with SHELLY Corado    TPN pager [phone], spectra [phone]  M-F 8A-4P, Weekends and holidays 8A-12P  discussed with Dr. Tariq and Dr. KELLY Hansen A/P:  70y M with history of heart transplant in 2/2018 admitted with autoimmune hemolytic anemia and dark stools requiring transfusion. Found to have c diff and abdominal distension on 8/23.  Pt developed Ileus.  Now tolerating clear liquid diet    - continue TPN: 135g AA, 140 dextrose, 50g lipids in 3000 cc total volume  - advance diet as tolerated  - hypertriglyceridema -  today.  50g lipids restarted today.  Check TG daily.  - hyperglycemia - currently on insulin gtt @ 6 ml/hr.  Will decrease CHO load in TPN from 280g to 140g today.  Insulin decreased from 40U to 30U today.  - hypernatremia - continue 3000cc total volume.  NaAce removed from formula.  If diuresis needed, recommend metolazone 10mg QD instead of lasix.  - Strict Intake and Output- fluid overload improving  - HyperMg- improving w/ MgSO4 8mEq  - HypoPhos improving w/ 30mMol NaPhos  - Weights as per protocol  - Monitor CMP, Mg, iCa, & Phos daily  - Central line w/ designated port for TPN, maintenance as per protocol  - Care per CTU  - discussed with SHELLY Corado    TPN pager [phone], spectra [phone]  M-F 8A-4P, Weekends and holidays 8A-12P  discussed with Dr. Tariq and Dr. KELLY Hansen

## 2020-09-06 NOTE — PROGRESS NOTE ADULT - SUBJECTIVE AND OBJECTIVE BOX
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 Patient was treated w/ prednisone and Rituximab. Tacro was discontinued and patient was also transitioned to everolimus  Admitted  6/16-6/19  for hyperglycemia.  Reported to transplant team having one done black tarry stool-  instructed to  present to [facility] for hemolytic anemia. Patient has been following with Hematology outpatient.  Denies CP  N/V diarrhea SOB. (11 Aug 2020 20:08)    Surgery/Hospital Course:  8/11 Admitted to [facility] with symptomatic anemia  8/13 EGD for investigation of tarry stools  8/15 SB capsule: stomach & SB lesions, likely ulcers.  8/17 EGD/push enteroscopy w/ angioectasias/erosions in small bowel. Gastropathy and esophagitis. Ulcer seen on VCE most likely scope trauma.    8/18 VSS transferred back to floor.   8/20 VS 9.0/28.4 stable Gastric biopsy results LA Grade A esophagitis.  - Acute gastritis. Biopsies taken to r/o CMV, No ulceration seen despite finding on capsule endoscopy - likely 2/2 scope  trauma that has since resolved. Pathology pending.  8/21 VSS H/H  8.1/25.7  trending down will monitor prednisone taper 30 mg today. Procardia 120 initiated today RHC biopsy Monday.   8/22 VVS; Patient reports loose stools x 2-3 days with 1 episode today.  Stool sent for C-dif and GI PCR. Abdominal X-ray revealed: dilated loops of bowel. Abdomen distended.  Patient denies N/V. GI called to re-evaluate. Continue with current medication regimen.  Awaiting for upcoming cardiac biopsy on Monday 8/24.  8/23   vss    creat up to 2.28 ,  repeating CMP,  TTE ordered  Bladder scan   8/24   cardiac bx cancelled   elev creat  to 2.74   cardio renal cslt called,    + CDIF   inc vanco to 500 q6   ID following  8/25 VSS: RHC today as per transplant team; transfuse 2 units PRBC today as per Dr. Viramontes; continue vanco for cdiff; transfer patient to CTU after cath today   8/26. Dieretic challenge with good response   8/27: Downgraded to the floor then upgraded to the CTU again for concerning of abd distention   8/28 CT ABD & Pelvis reveals pancolitis  8/30: repeat CT scan of abd, gen surgery called to re-evaluated pt.  8/31: RUE duplex negative for DVT  9/4 NGT clamped, minimal residual. NGT removed. started sips     Today:      REVIEW OF SYSTEMS:  Gen: No fever  EYES/ENT: No visual changes;  No vertigo or throat pain   NECK: No pain   RES:  No shortness of breath or Cough  CARD: No chest pain   GI: No abdominal pain  : No dysuria  NEURO: No weakness  SKIN: No itching, rashes       ICU Vital Signs Last 24 Hrs  T(C): 36.5 (06 Sep 2020 08:00), Max: 36.7 (06 Sep 2020 04:00)  T(F): 97.7 (06 Sep 2020 08:00), Max: 98.1 (06 Sep 2020 04:00)  HR: 97 (06 Sep 2020 10:00) (90 - 112)  BP: 130/84 (06 Sep 2020 10:00) (101/69 - 163/92)  BP(mean): 98 (06 Sep 2020 10:00) (81 - 122)  ABP: --  ABP(mean): --  RR: 17 (06 Sep 2020 10:00) (12 - 33)  SpO2: 96% (06 Sep 2020 07:00) (95% - 100%)    Physical Exam:  Gen:  Awake, alert, lethargic at times    CNS: non focal 	  Neck: no JVD  RES : clear , no wheezing              CVS: Sinus Tachycardia. Normal S1/S2  Abd: soft, non-tender, distended. Bowel sounds present but soft.   Skin: No rash.  Ext:  +1 pedal edema    ============================I/O===========================   I&O's Detail    05 Sep 2020 07:01  -  06 Sep 2020 07:00  --------------------------------------------------------  IN:    dexmedetomidine Infusion: 86.4 mL    insulin regular Infusion: 80.5 mL    Oral Fluid: 300 mL    Solution: 27.3 mL    TPN (Total Parenteral Nutrition): 2580 mL  Total IN: 3074.2 mL    OUT:    Indwelling Catheter - Urethral: 1425 mL  Total OUT: 1425 mL    Total NET: 1649.2 mL      06 Sep 2020 07:01  -  06 Sep 2020 10:15  --------------------------------------------------------  IN:    dexmedetomidine Infusion: 28.2 mL    insulin regular Infusion: 18 mL    Solution: 6.3 mL    TPN (Total Parenteral Nutrition): 375 mL  Total IN: 427.5 mL    OUT:    Indwelling Catheter - Urethral: 450 mL  Total OUT: 450 mL    Total NET: -22.5 mL      ============================ LABS =========================                        7.3    17.60 )-----------( 136      ( 06 Sep 2020 06:34 )             23.8     09-06    149<H>  |  118<H>  |  83<H>  ----------------------------<  169<H>  3.7   |  22  |  1.11    Ca    7.9<L>      06 Sep 2020 06:34  Phos  2.9     09-06  Mg     1.8     09-06    TPro  6.0  /  Alb  1.8<L>  /  TBili  1.0  /  DBili  x   /  AST  56<H>  /  ALT  31  /  AlkPhos  96  09-06    LIVER FUNCTIONS - ( 06 Sep 2020 06:34 )  Alb: 1.8 g/dL / Pro: 6.0 g/dL / ALK PHOS: 96 U/L / ALT: 31 U/L / AST: 56 U/L / GGT: x           PT/INR - ( 06 Sep 2020 06:34 )   PT: 12.6 sec;   INR: 1.06 ratio       PTT - ( 06 Sep 2020 06:34 )  PTT:49.2 sec  ABG - ( 06 Sep 2020 06:31 )  pH, Arterial: 7.40  pH, Blood: x     /  pCO2: 39    /  pO2: 137   / HCO3: 24    / Base Excess: x     /  SaO2: 99        ======================Micro/Rad/Cardio=================  Culture: Reviewed   CXR: Reviewed  Echo: Reviewed  ======================================================  PAST MEDICAL & SURGICAL HISTORY:  H/O hemolytic anemia  H/O autoimmune hemolytic anemia  Knee pain, right  HLD (hyperlipidemia)  Former smoker  DVT of upper extremity (deep vein thrombosis)  Hepatitis C virus  GIB (gastrointestinal bleeding)  Ventricular fibrillation: s/p AICD  PAF (paroxysmal atrial fibrillation): on xarelto  Non-Ischemic Cardiomyopathy  SVT (Supraventricular Tachycardia)  HTN  CHF (Congestive Heart Failure)  S/P right heart catheterization: biopsy multiple  H/O heart transplant: 2/2018  Status post left hip replacement  History of Prior Ablation Treatment: for afib  AICD (Automatic Cardioverter/Defibrillator) Present: [employer] with 1 [employer] lead4/1/09- explanted and replaced with Medtronic 2 leads on 9/2/09    ====================ASSESSMENT ==============  Heart transplant 2/2018 for ACC/AHA stage D NICM   Resolved GI Bleed, Melena s/p EGD  Acute respiratory failure, resolved  Supraventricular tachycardia  Severe hypertension  Hyperlactatemia acidosis, resolved  Leukopenia- resolved  Acute blood loss anemia, stable   CKD Stage III  C. diff diarrhea  Klebsiella oxytoca bacteremia  Sepsis  Azotemia 2/2 CKD and Steroids   Pancolitis    Plan:  ====================== NEUROLOGY=====================  Nonfocal, continue to monitor neurological status as luis ICU protocol. Continue PT, out of bed to chair as tolerated.  On low dose Precedex for anxiety.      dexMEDEtomidine Infusion 0.25 MICROgram(s)/kG/Hr (4.7 mL/Hr) IV Continuous <Continuous>    ==================== RESPIRATORY======================   On supplemental O2 therapy via 2L NC, wean off as tolerated   Encourage incentive spirometry, continue pulse ox monitoring, follow ABGs.     ====================CARDIOVASCULAR==================  Transplant team following.   Heart transplant 2/2018 for ACC/AHA stage D NICM, now on solumedrol from prednisone   (NPO for abd distention) off everolimus. Off cellcept while on high dose steroids.   Continue hemodynamic monitoring.   ASA on hold for hx of GI bleed / ulceration.     hydrALAZINE 25 milliGRAM(s) Oral every 8 hours  methylPREDNISolone sodium succinate Injectable 8 milliGRAM(s) IV Push <User Schedule>    ===================HEMATOLOGIC/ONC ===================  Anemia s/p multiple blood transfusions. Continue closely monitoring H&H and transfuse prn.   GI Bleeding ruled out s/p EGD, found to have ulcers. F/u heme recs for hx of autoimmune hemolytic anemia.    Heparin for DVT prophylaxis.     heparin   Injectable 5000 Unit(s) SubCutaneous every 8 hours    ===================== RENAL =========================  NORMAN on CKD stage 3, Anasarca. Lasix PRN  Continue monitoring urine output, BUN/Creatinine, I/Os and replete electrolytes prn.     ==================== GASTROINTESTINAL===================  Nutrition following.  Ileus present in setting of Cdiff.- NGT clamped 9/4 for 4 hrs, no residual. NGT removed, stared on sips, c/w TPN for now.    CT A/P on 8/30 with Pancolitis and small volume abdominopelvic ascites, not significantly changed since 8/20/2020.  General surgery following-continue serial abdominal exams. No surgical intervention at this time.     Continue clear liquid diet, advance as tolerated.  Continue Protonix for stress ulcer prophylaxis    pantoprazole  Injectable 40 milliGRAM(s) IV Push two times a day  Parenteral Nutrition - Adult 1 Each (125 mL/Hr) TPN Continuous <Continuous>  potassium chloride  10 mEq/50 mL IVPB 10 milliEquivalent(s) IV Intermittent every 1 hour    =======================    ENDOCRINE  =====================  Glucose control with Humalog sliding scale for stress hyperglycemia     insulin regular Infusion 1 Unit(s)/Hr (1 mL/Hr) IV Continuous <Continuous>    ========================INFECTIOUS DISEASE================  Klebsiella bacteremia from 8/13 which has since cleared  Clostridium difficile PCR positive, continue with vancomycin PO and Rectal Vanco for worsening  c/w eravacycline for CDiff   Transplant prophylaxis with Posaconazole and Atovaquone on hold duo to ileus   Monitor Fever / leukocytosis.  Dr. Lujan following, no need for addition abx at this time. Monitor for now.     vancomycin    Solution 500 milliGRAM(s) Oral every 6 hours  vancomycin  Retention Enema 500 milliGRAM(s) Rectal every 6 hours      Patient requires continuous monitoring with bedside rhythm monitoring, pulse ox monitoring, and intermittent blood gas analysis. Care plan discussed with ICU care team. Patient remained critical and at risk for life threatening decompensation.     By signing my name below, I, Angie Mg, attest that this documentation has been prepared under the direction and in the presence of Mak Villagran PA.  Electronically signed: Ab Garcia, 09-06-20 @ 10:15    I, Mak Villagran, personally performed the services described in this documentation. All medical record entries made by the ab were at my direction and in my presence. I have reviewed the chart and agree that the record reflects my personal performance and is accurate and complete  Electronically signed: Mak Villagran PA. 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 Patient was treated w/ prednisone and Rituximab. Tacro was discontinued and patient was also transitioned to everolimus  Admitted  6/16-6/19  for hyperglycemia.  Reported to transplant team having one black tarry stool- instructed to present to [facility] for hemolytic anemia. Patient has been following with Hematology outpatient.  Denies CP  N/V diarrhea SOB. (11 Aug 2020 20:08)    Surgery/Hospital Course:  8/11 Admitted to [facility] with symptomatic anemia  8/13 EGD for investigation of tarry stools  8/15 SB capsule: stomach & SB lesions, likely ulcers.  8/17 EGD/push enteroscopy w/ angioectasias/erosions in small bowel. Gastropathy and esophagitis. Ulcer seen on VCE most likely scope trauma.    8/18 VSS transferred back to floor.   8/20 VS 9.0/28.4 stable Gastric biopsy results LA Grade A esophagitis.  - Acute gastritis. Biopsies taken to r/o CMV, No ulceration seen despite finding on capsule endoscopy - likely 2/2 scope  trauma that has since resolved. Pathology pending.  8/21 VSS H/H  8.1/25.7  trending down will monitor prednisone taper 30 mg today. Procardia 120 initiated today RHC biopsy Monday.   8/22 VVS; Patient reports loose stools x 2-3 days with 1 episode today.  Stool sent for C-dif and GI PCR. Abdominal X-ray revealed: dilated loops of bowel. Abdomen distended.  Patient denies N/V. GI called to re-evaluate. Continue with current medication regimen.  Awaiting for upcoming cardiac biopsy on Monday 8/24.  8/23   vss    creat up to 2.28 ,  repeating CMP,  TTE ordered  Bladder scan   8/24   cardiac bx cancelled   elev creat  to 2.74   cardio renal cslt called,    + CDIF   inc vanco to 500 q6   ID following  8/25 VSS: RHC today as per transplant team; transfuse 2 units PRBC today as per Dr. Viramontes; continue vanco for cdiff; transfer patient to CTU after cath today   8/26. Diuretic challenge with good response   8/27: Downgraded to the floor then upgraded to the CTU again for concern of abd distention   8/28 CT ABD & Pelvis reveals pancolitis  8/30: repeat CT scan of abd, gen surgery called to re-evaluated pt.  8/31: RUE duplex negative for DVT  9/4 NGT clamped, minimal residual. NGT removed. started sips   9/6 tolerating clear liquid diet    Today: tolerating clears, starting glucerna trickle feeds via NGT      REVIEW OF SYSTEMS:  Gen: No fever  EYES/ENT: No visual changes;  No vertigo or throat pain   NECK: No pain   RES:  No shortness of breath or Cough  CARD: No chest pain   GI: No abdominal pain  : No dysuria  NEURO: No weakness  SKIN: No itching, rashes       ICU Vital Signs Last 24 Hrs  T(C): 36.5 (06 Sep 2020 08:00), Max: 36.7 (06 Sep 2020 04:00)  T(F): 97.7 (06 Sep 2020 08:00), Max: 98.1 (06 Sep 2020 04:00)  HR: 97 (06 Sep 2020 10:00) (90 - 112)  BP: 130/84 (06 Sep 2020 10:00) (101/69 - 163/92)  BP(mean): 98 (06 Sep 2020 10:00) (81 - 122)  ABP: --  ABP(mean): --  RR: 17 (06 Sep 2020 10:00) (12 - 33)  SpO2: 96% (06 Sep 2020 07:00) (95% - 100%)    Physical Exam:  Gen:  Awake, alert, no acute distress  CNS: non focal 	  Neck: no JVD  RES :  dec BS b/l, crackles at bases, no wheezing              CVS: RRR Normal S1/S2, +2 edema b/l LE  Abd: distended but soft, non-tender, no rebound tenderness. Bowel sounds present  Skin: No rash.    ============================I/O===========================   I&O's Detail    05 Sep 2020 07:01  -  06 Sep 2020 07:00  --------------------------------------------------------  IN:    dexmedetomidine Infusion: 86.4 mL    insulin regular Infusion: 80.5 mL    Oral Fluid: 300 mL    Solution: 27.3 mL    TPN (Total Parenteral Nutrition): 2580 mL  Total IN: 3074.2 mL    OUT:    Indwelling Catheter - Urethral: 1425 mL  Total OUT: 1425 mL    Total NET: 1649.2 mL      06 Sep 2020 07:01  -  06 Sep 2020 10:15  --------------------------------------------------------  IN:    dexmedetomidine Infusion: 28.2 mL    insulin regular Infusion: 18 mL    Solution: 6.3 mL    TPN (Total Parenteral Nutrition): 375 mL  Total IN: 427.5 mL    OUT:    Indwelling Catheter - Urethral: 450 mL  Total OUT: 450 mL    Total NET: -22.5 mL      ============================ LABS =========================                        7.3    17.60 )-----------( 136      ( 06 Sep 2020 06:34 )             23.8     09-06    149<H>  |  118<H>  |  83<H>  ----------------------------<  169<H>  3.7   |  22  |  1.11    Ca    7.9<L>      06 Sep 2020 06:34  Phos  2.9     09-06  Mg     1.8     09-06    TPro  6.0  /  Alb  1.8<L>  /  TBili  1.0  /  DBili  x   /  AST  56<H>  /  ALT  31  /  AlkPhos  96  09-06    LIVER FUNCTIONS - ( 06 Sep 2020 06:34 )  Alb: 1.8 g/dL / Pro: 6.0 g/dL / ALK PHOS: 96 U/L / ALT: 31 U/L / AST: 56 U/L / GGT: x           PT/INR - ( 06 Sep 2020 06:34 )   PT: 12.6 sec;   INR: 1.06 ratio       PTT - ( 06 Sep 2020 06:34 )  PTT:49.2 sec  ABG - ( 06 Sep 2020 06:31 )  pH, Arterial: 7.40  pH, Blood: x     /  pCO2: 39    /  pO2: 137   / HCO3: 24    / Base Excess: x     /  SaO2: 99        ======================Micro/Rad/Cardio=================  Culture: Reviewed   CXR: Reviewed  Echo: Reviewed  ======================================================  PAST MEDICAL & SURGICAL HISTORY:  H/O hemolytic anemia  H/O autoimmune hemolytic anemia  Knee pain, right  HLD (hyperlipidemia)  Former smoker  DVT of upper extremity (deep vein thrombosis)  Hepatitis C virus  GIB (gastrointestinal bleeding)  Ventricular fibrillation: s/p AICD  PAF (paroxysmal atrial fibrillation): on xarelto  Non-Ischemic Cardiomyopathy  SVT (Supraventricular Tachycardia)  HTN  CHF (Congestive Heart Failure)  S/P right heart catheterization: biopsy multiple  H/O heart transplant: 2/2018  Status post left hip replacement  History of Prior Ablation Treatment: for afib  AICD (Automatic Cardioverter/Defibrillator) Present: [employer] with 1 [employer] lead4/1/09- explanted and replaced with Medtronic 2 leads on 9/2/09    ====================ASSESSMENT ==============  Heart transplant 2/2018 for ACC/AHA stage D NICM   Resolved GI Bleed, Melena s/p EGD  Acute respiratory failure, resolved  Supraventricular tachycardia  Severe hypertension  Hyperlactatemia acidosis, resolved  Leukopenia- resolved  Acute blood loss anemia, stable   CKD Stage III  C. diff diarrhea  Klebsiella oxytoca bacteremia  Sepsis  Azotemia 2/2 CKD and Steroids   Pancolitis    Plan:  ====================== NEUROLOGY=====================  Nonfocal, continue to monitor neurological status as luis ICU protocol. Continue PT, out of bed to chair as tolerated.      ==================== RESPIRATORY======================   On supplemental O2 therapy via 2L NC, wean off as tolerated   Encourage incentive spirometry, continue pulse ox monitoring, follow ABGs.   sono shows atelectasis, no pleural effusion b/l.  encourage chest PT, PT, OOB to chair  BIPAP PRN for resp support/ positive pressure, atelectasis    ====================CARDIOVASCULAR==================  Transplant team following.   Heart transplant 2/2018 for ACC/AHA stage D NICM, now on solumedrol from prednisone   (NPO for abd distention) off everolimus. Off cellcept while on high dose steroids.   Continue hemodynamic monitoring.   ASA on hold for hx of GI bleed / ulceration.   Hypertensive, uptitrate HDZN as tolerates    hydrALAZINE 50 milliGRAM(s) Oral every 8 hours  methylPREDNISolone sodium succinate Injectable 8 milliGRAM(s) IV Push <User Schedule>    ===================HEMATOLOGIC/ONC ===================  Anemia s/p multiple blood transfusions. Continue closely monitoring H&H and transfuse prn.   GI Bleeding ruled out s/p EGD, found to have ulcers. F/u heme recs for hx of autoimmune hemolytic anemia.    Heparin for DVT prophylaxis.     heparin   Injectable 5000 Unit(s) SubCutaneous every 8 hours    ===================== RENAL =========================  NORMAN on CKD stage 3, Anasarca. Lasix PRN  Continue monitoring urine output, BUN/Creatinine, I/Os and replete electrolytes prn.     ==================== GASTROINTESTINAL===================  Nutrition following.  Ileus present in setting of Cdiff.- NGT clamped 9/4 for 4 hrs, no residual. NGT removed, stared on sips, c/w TPN for now.    CT A/P on 8/30 with Pancolitis and small volume abdominopelvic ascites, not significantly changed since 8/20/2020.  General surgery following-continue serial abdominal exams. No surgical intervention at this time.     Continue clear liquid diet, advance as tolerated.  Continue Protonix for stress ulcer prophylaxis  TPN changed due to hypernatremia/hyperglycemia  start Glucerna trickle feeds    pantoprazole  Injectable 40 milliGRAM(s) IV qd  Parenteral Nutrition - Adult 1 Each (125 mL/Hr) TPN Continuous <Continuous>  potassium chloride  10 mEq/50 mL IVPB 10 milliEquivalent(s) IV Intermittent every 1 hour    =======================    ENDOCRINE  =====================  Glucose control with Humalog sliding scale for stress hyperglycemia     insulin regular Infusion 1 Unit(s)/Hr (1 mL/Hr) IV Continuous <Continuous>    ========================INFECTIOUS DISEASE================  Klebsiella bacteremia from 8/13 which has since cleared  Clostridium difficile PCR positive, continue with vancomycin PO and Rectal Vanco   c/w eravacycline for CDiff   Transplant prophylaxis with Posaconazole and Atovaquone on hold duo to ileus   Monitor Fever / leukocytosis.  Dr. Lujan following, no need for additional abx at this time. Monitor for now.     vancomycin    Solution 500 milliGRAM(s) Oral every 6 hours  vancomycin  Retention Enema 500 milliGRAM(s) Rectal every 6 hours      Patient requires continuous monitoring with bedside rhythm monitoring, pulse ox monitoring, and intermittent blood gas analysis. Care plan discussed with ICU care team. Patient remained critical and at risk for life threatening decompensation.     By signing my name below, I, Angie Mg, attest that this documentation has been prepared under the direction and in the presence of Mak Villagran PA.  Electronically signed: Katty Garcia, 09-06-20 @ 10:15    I, Mak Villagran, personally performed the services described in this documentation. All medical record entries made by the scribe were at my direction and in my presence. I have reviewed the chart and agree that the record reflects my personal performance and is accurate and complete  Electronically signed: Mak Villagran PA.

## 2020-09-06 NOTE — PROGRESS NOTE ADULT - ASSESSMENT
Mr. 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 He was treated with Rituximab and high dose steroids. He is currently admitted with symptomatic anemia with Hgb initially of 6.6 requiring blood transfusions. Evaluation was not consistent with hemolysis. EGD/enteroscopy eventually revealed chronic gastritis and small bowel ectasias.  His course was complicated by development of severe leukopenia and acute respiratory distress and profound sinus tachycardia on 8/13 in setting of Klebsiella bacteremia of unclear origin (preceded EGD). A CT scan of the chest showed a possible infiltrate. While his bacteremia was treated, he subsequently developed C diff colitis with severe abdominal distention. He also developed worsening acute on chronic renal failure, likely due to volume depletion, which has resolved. He is currently volume overloaded and diuretics were started on 9/1 with good response. He has ongoing improvement in abdominal distention, but has worsening leukocytosis of unclear etiology, concerning for gut translocation of bacteria. He is hypertensive.     Plan discussed in multidisciplinary rounds with CTU team and CT surgery.

## 2020-09-06 NOTE — PROGRESS NOTE ADULT - PROBLEM SELECTOR PLAN 2
- We will adjust IV cyclosporine as necessary. Currently at 10mg/24hours to reduce overall level of immunosuppression. Everolimus level was 5.3 on 8/31 and has been discontinued.     - Continue current methylprednisone dose (equivalent to 10 mg daily of prednisone). We will gradually decrease the dose.  - Given hypertension, we will start hydralazine 25 mg PO TID and increase as necessary.

## 2020-09-06 NOTE — PROGRESS NOTE ADULT - SUBJECTIVE AND OBJECTIVE BOX
[facility] NUTRITION SUPPORT / TPN -- FOLLOW UP NOTE  --------------------------------------------------------------------------------    24 hour events/subjective:  Tolerating clear liquids since yesterday, denies N/V.  +BM.  Pain well controlled.  Remains on full strength TPN without lipids    Diet:  Diet, Clear Liquid (09-05-20 @ 11:34)      PAST HISTORY  --------------------------------------------------------------------------------  No significant changes to PMH, PSH, FHx, SHx, unless otherwise noted    ALLERGIES & MEDICATIONS  --------------------------------------------------------------------------------  Allergies    No Known Allergies    Intolerances      Standing Inpatient Medications  chlorhexidine 2% Cloths 1 Application(s) Topical <User Schedule>  cycloSPORINE  (SandIMMUNE) IVPB 10 milliGRAM(s) IV Intermittent every 24 hours  dexMEDEtomidine Infusion 0.25 MICROgram(s)/kG/Hr IV Continuous <Continuous>  Eravacycline (Xerava) 75 milliGRAM(s),Sodium Chloride 0.9% 150 milliLiter(s) 75 milliGRAM(s) IV Intermittent every 12 hours  heparin   Injectable 5000 Unit(s) SubCutaneous every 8 hours  hydrALAZINE 25 milliGRAM(s) Oral every 8 hours  insulin regular Infusion 1 Unit(s)/Hr IV Continuous <Continuous>  methylPREDNISolone sodium succinate Injectable 8 milliGRAM(s) IV Push <User Schedule>  pantoprazole  Injectable 40 milliGRAM(s) IV Push two times a day  Parenteral Nutrition - Adult 1 Each TPN Continuous <Continuous>  potassium chloride  10 mEq/50 mL IVPB 10 milliEquivalent(s) IV Intermittent every 1 hour  vancomycin    Solution 500 milliGRAM(s) Oral every 6 hours  vancomycin  Retention Enema 500 milliGRAM(s) Rectal every 6 hours    PRN Inpatient Medications  benzocaine 15 mG/menthol 3.6 mG (Sugar-Free) Lozenge 1 Lozenge Oral every 6 hours PRN      VITALS/PHYSICAL EXAM  --------------------------------------------------------------------------------  T(C): 36.5 (09-06-20 @ 08:00), Max: 36.7 (09-06-20 @ 04:00)  HR: 97 (09-06-20 @ 09:00) (90 - 112)  BP: 129/89 (09-06-20 @ 09:00) (101/69 - 163/92)  RR: 19 (09-06-20 @ 09:00) (12 - 33)  SpO2: 96% (09-06-20 @ 07:00) (95% - 100%)  Wt(kg): --        09-05-20 @ 07:01  -  09-06-20 @ 07:00  --------------------------------------------------------  IN: 3074.2 mL / OUT: 1425 mL / NET: 1649.2 mL    09-06-20 @ 07:01  -  09-06-20 @ 09:47  --------------------------------------------------------  IN: 285 mL / OUT: 450 mL / NET: -165 mL          PHYSICAL EXAM:  GEN:  NAD, abd softly distended, NT  HEENT: NC/AT, PERRL, mucosa moist & throat clear, no trachea midline w/ neck supple  GI: (-) BS, firm, distended/ nontender (+)tympanitic   MSK:  FROM x4, no deformities  VASCULAR: Equally warm, no cyanosis, clubbing,        Lt IJ TLC &  LUE PICC w/ dressing c/d/i,         BLE mild edema equal,  R>LUE edema- soft nontender w/o cord or erythema  Neurology; no focal deficits, weakened strength & sensation grossly intact  Psych: normal affect  Skin: warm,  moist, & w/ good turgor          LABS/STUDIES  --------------------------------------------------------------------------------              7.3    17.60 >-----------<  136      [09-06-20 @ 06:34]              23.8     149  |  118  |  83  ----------------------------<  169      [09-06-20 @ 06:34]  3.7   |  22  |  1.11        Ca     7.9     [09-06-20 @ 06:34]      Mg     1.8     [09-06-20 @ 06:34]      Phos  2.9     [09-06-20 @ 06:34]    TPro  6.0  /  Alb  1.8  /  TBili  1.0  /  DBili  x   /  AST  56  /  ALT  31  /  AlkPhos  96  [09-06-20 @ 06:34]    PT/INR: PT 12.6 , INR 1.06       [09-06-20 @ 06:34]  PTT: 49.2       [09-06-20 @ 06:34]      Ca ionizedBlood Gas Arterial - Calcium, Ionized: 1.20 mmoL/L (09-06-20 @ 06:31)  Blood Gas Arterial - Calcium, Ionized: 1.21 mmoL/L (09-04-20 @ 21:12)  Blood Gas Calcium, Ionized - Venous: 1.19 mmoL/L (09-04-20 @ 14:38)    Creatinine Trend:  POC glucoseGlucose, Serum: 169 mg/dL (09-06-20 @ 06:34)  Glucose, Serum: 930 mg/dL (09-06-20 @ 05:35)  CAPILLARY BLOOD GLUCOSE      POCT Blood Glucose.: 152 mg/dL (06 Sep 2020 06:17)  POCT Blood Glucose.: 132 mg/dL (06 Sep 2020 02:27)  POCT Blood Glucose.: 113 mg/dL (06 Sep 2020 00:19)  POCT Blood Glucose.: 182 mg/dL (05 Sep 2020 21:48)  POCT Blood Glucose.: 194 mg/dL (05 Sep 2020 19:53)  POCT Blood Glucose.: 186 mg/dL (05 Sep 2020 18:45)  POCT Blood Glucose.: 173 mg/dL (05 Sep 2020 17:58)  POCT Blood Glucose.: 176 mg/dL (05 Sep 2020 17:26)  POCT Blood Glucose.: 171 mg/dL (05 Sep 2020 15:22)  POCT Blood Glucose.: 169 mg/dL (05 Sep 2020 14:26)  POCT Blood Glucose.: 142 mg/dL (05 Sep 2020 12:55)  POCT Blood Glucose.: 151 mg/dL (05 Sep 2020 11:58)  POCT Blood Glucose.: 151 mg/dL (05 Sep 2020 11:21)  POCT Blood Glucose.: 145 mg/dL (05 Sep 2020 09:51)    PrealbuminPrealbumin, Serum: 10 mg/dL (09-02-20 @ 08:12)  Prealbumin, Serum: 24 mg/dL (08-25-20 @ 15:10)    Triglycerides [facility] NUTRITION SUPPORT / TPN -- FOLLOW UP NOTE  --------------------------------------------------------------------------------    24 hour events/subjective:  Tolerating clear liquids since yesterday, denies N/V.  +BM.  Pain well controlled.  Remains on full strength TPN without lipids.    Diet:  Diet, Clear Liquid (09-05-20 @ 11:34)      PAST HISTORY  --------------------------------------------------------------------------------  No significant changes to PMH, PSH, FHx, SHx, unless otherwise noted    ALLERGIES & MEDICATIONS  --------------------------------------------------------------------------------  Allergies    No Known Allergies    Intolerances      Standing Inpatient Medications  chlorhexidine 2% Cloths 1 Application(s) Topical <User Schedule>  cycloSPORINE  (SandIMMUNE) IVPB 10 milliGRAM(s) IV Intermittent every 24 hours  dexMEDEtomidine Infusion 0.25 MICROgram(s)/kG/Hr IV Continuous <Continuous>  Eravacycline (Xerava) 75 milliGRAM(s),Sodium Chloride 0.9% 150 milliLiter(s) 75 milliGRAM(s) IV Intermittent every 12 hours  heparin   Injectable 5000 Unit(s) SubCutaneous every 8 hours  hydrALAZINE 25 milliGRAM(s) Oral every 8 hours  insulin regular Infusion 1 Unit(s)/Hr IV Continuous <Continuous>  methylPREDNISolone sodium succinate Injectable 8 milliGRAM(s) IV Push <User Schedule>  pantoprazole  Injectable 40 milliGRAM(s) IV Push two times a day  Parenteral Nutrition - Adult 1 Each TPN Continuous <Continuous>  potassium chloride  10 mEq/50 mL IVPB 10 milliEquivalent(s) IV Intermittent every 1 hour  vancomycin    Solution 500 milliGRAM(s) Oral every 6 hours  vancomycin  Retention Enema 500 milliGRAM(s) Rectal every 6 hours    PRN Inpatient Medications  benzocaine 15 mG/menthol 3.6 mG (Sugar-Free) Lozenge 1 Lozenge Oral every 6 hours PRN      VITALS/PHYSICAL EXAM  --------------------------------------------------------------------------------  T(C): 36.5 (09-06-20 @ 08:00), Max: 36.7 (09-06-20 @ 04:00)  HR: 97 (09-06-20 @ 09:00) (90 - 112)  BP: 129/89 (09-06-20 @ 09:00) (101/69 - 163/92)  RR: 19 (09-06-20 @ 09:00) (12 - 33)  SpO2: 96% (09-06-20 @ 07:00) (95% - 100%)  Wt(kg): --        09-05-20 @ 07:01  -  09-06-20 @ 07:00  --------------------------------------------------------  IN: 3074.2 mL / OUT: 1425 mL / NET: 1649.2 mL    09-06-20 @ 07:01  -  09-06-20 @ 09:47  --------------------------------------------------------  IN: 285 mL / OUT: 450 mL / NET: -165 mL          PHYSICAL EXAM:  GEN:  NAD, abd softly distended, NT  HEENT: NC/AT, PERRL, mucosa moist & throat clear, no trachea midline w/ neck supple  GI: (-) BS, firm, distended/ nontender (+)tympanitic   MSK:  FROM x4, no deformities  VASCULAR: Equally warm, no cyanosis, clubbing,        Lt IJ TLC &  LUE PICC w/ dressing c/d/i,         BLE mild edema equal,  R>LUE edema- soft nontender w/o cord or erythema  Neurology; no focal deficits, weakened strength & sensation grossly intact  Psych: normal affect  Skin: warm,  moist, & w/ good turgor          LABS/STUDIES  --------------------------------------------------------------------------------              7.3    17.60 >-----------<  136      [09-06-20 @ 06:34]              23.8     149  |  118  |  83  ----------------------------<  169      [09-06-20 @ 06:34]  3.7   |  22  |  1.11        Ca     7.9     [09-06-20 @ 06:34]      Mg     1.8     [09-06-20 @ 06:34]      Phos  2.9     [09-06-20 @ 06:34]    TPro  6.0  /  Alb  1.8  /  TBili  1.0  /  DBili  x   /  AST  56  /  ALT  31  /  AlkPhos  96  [09-06-20 @ 06:34]    PT/INR: PT 12.6 , INR 1.06       [09-06-20 @ 06:34]  PTT: 49.2       [09-06-20 @ 06:34]      Ca ionizedBlood Gas Arterial - Calcium, Ionized: 1.20 mmoL/L (09-06-20 @ 06:31)  Blood Gas Arterial - Calcium, Ionized: 1.21 mmoL/L (09-04-20 @ 21:12)  Blood Gas Calcium, Ionized - Venous: 1.19 mmoL/L (09-04-20 @ 14:38)    Creatinine Trend:  POC glucoseGlucose, Serum: 169 mg/dL (09-06-20 @ 06:34)  Glucose, Serum: 930 mg/dL (09-06-20 @ 05:35)  CAPILLARY BLOOD GLUCOSE      POCT Blood Glucose.: 152 mg/dL (06 Sep 2020 06:17)  POCT Blood Glucose.: 132 mg/dL (06 Sep 2020 02:27)  POCT Blood Glucose.: 113 mg/dL (06 Sep 2020 00:19)  POCT Blood Glucose.: 182 mg/dL (05 Sep 2020 21:48)  POCT Blood Glucose.: 194 mg/dL (05 Sep 2020 19:53)  POCT Blood Glucose.: 186 mg/dL (05 Sep 2020 18:45)  POCT Blood Glucose.: 173 mg/dL (05 Sep 2020 17:58)  POCT Blood Glucose.: 176 mg/dL (05 Sep 2020 17:26)  POCT Blood Glucose.: 171 mg/dL (05 Sep 2020 15:22)  POCT Blood Glucose.: 169 mg/dL (05 Sep 2020 14:26)  POCT Blood Glucose.: 142 mg/dL (05 Sep 2020 12:55)  POCT Blood Glucose.: 151 mg/dL (05 Sep 2020 11:58)  POCT Blood Glucose.: 151 mg/dL (05 Sep 2020 11:21)  POCT Blood Glucose.: 145 mg/dL (05 Sep 2020 09:51)    PrealbuminPrealbumin, Serum: 10 mg/dL (09-02-20 @ 08:12)  Prealbumin, Serum: 24 mg/dL (08-25-20 @ 15:10)    Triglycerides

## 2020-09-07 LAB
ANION GAP SERPL CALC-SCNC: 9 MMOL/L — SIGNIFICANT CHANGE UP (ref 5–17)
BASE EXCESS BLDV CALC-SCNC: -1.4 MMOL/L — SIGNIFICANT CHANGE UP (ref -2–2)
BUN SERPL-MCNC: 90 MG/DL — HIGH (ref 7–23)
CA-I SERPL-SCNC: 1.19 MMOL/L — SIGNIFICANT CHANGE UP (ref 1.12–1.3)
CALCIUM SERPL-MCNC: 7.8 MG/DL — LOW (ref 8.4–10.5)
CHLORIDE BLDV-SCNC: 116 MMOL/L — HIGH (ref 96–108)
CHLORIDE SERPL-SCNC: 111 MMOL/L — HIGH (ref 96–108)
CO2 BLDV-SCNC: 26 MMOL/L — SIGNIFICANT CHANGE UP (ref 22–30)
CO2 SERPL-SCNC: 23 MMOL/L — SIGNIFICANT CHANGE UP (ref 22–31)
CREAT SERPL-MCNC: 1.17 MG/DL — SIGNIFICANT CHANGE UP (ref 0.5–1.3)
CYCLOSPORINE SER-MCNC: 57 NG/ML — LOW (ref 150–400)
GAS PNL BLDA: SIGNIFICANT CHANGE UP
GAS PNL BLDV: 142 MMOL/L — SIGNIFICANT CHANGE UP (ref 135–145)
GAS PNL BLDV: SIGNIFICANT CHANGE UP
GLUCOSE BLDC GLUCOMTR-MCNC: 109 MG/DL — HIGH (ref 70–99)
GLUCOSE BLDC GLUCOMTR-MCNC: 110 MG/DL — HIGH (ref 70–99)
GLUCOSE BLDC GLUCOMTR-MCNC: 110 MG/DL — HIGH (ref 70–99)
GLUCOSE BLDC GLUCOMTR-MCNC: 111 MG/DL — HIGH (ref 70–99)
GLUCOSE BLDC GLUCOMTR-MCNC: 112 MG/DL — HIGH (ref 70–99)
GLUCOSE BLDC GLUCOMTR-MCNC: 118 MG/DL — HIGH (ref 70–99)
GLUCOSE BLDC GLUCOMTR-MCNC: 119 MG/DL — HIGH (ref 70–99)
GLUCOSE BLDC GLUCOMTR-MCNC: 121 MG/DL — HIGH (ref 70–99)
GLUCOSE BLDC GLUCOMTR-MCNC: 122 MG/DL — HIGH (ref 70–99)
GLUCOSE BLDC GLUCOMTR-MCNC: 123 MG/DL — HIGH (ref 70–99)
GLUCOSE BLDC GLUCOMTR-MCNC: 123 MG/DL — HIGH (ref 70–99)
GLUCOSE BLDC GLUCOMTR-MCNC: 126 MG/DL — HIGH (ref 70–99)
GLUCOSE BLDC GLUCOMTR-MCNC: 129 MG/DL — HIGH (ref 70–99)
GLUCOSE BLDC GLUCOMTR-MCNC: 132 MG/DL — HIGH (ref 70–99)
GLUCOSE BLDC GLUCOMTR-MCNC: 137 MG/DL — HIGH (ref 70–99)
GLUCOSE BLDC GLUCOMTR-MCNC: 141 MG/DL — HIGH (ref 70–99)
GLUCOSE BLDC GLUCOMTR-MCNC: 143 MG/DL — HIGH (ref 70–99)
GLUCOSE BLDC GLUCOMTR-MCNC: 148 MG/DL — HIGH (ref 70–99)
GLUCOSE BLDC GLUCOMTR-MCNC: 150 MG/DL — HIGH (ref 70–99)
GLUCOSE BLDC GLUCOMTR-MCNC: 161 MG/DL — HIGH (ref 70–99)
GLUCOSE BLDC GLUCOMTR-MCNC: 161 MG/DL — HIGH (ref 70–99)
GLUCOSE BLDC GLUCOMTR-MCNC: 84 MG/DL — SIGNIFICANT CHANGE UP (ref 70–99)
GLUCOSE BLDV-MCNC: 132 MG/DL — HIGH (ref 70–99)
GLUCOSE SERPL-MCNC: 131 MG/DL — HIGH (ref 70–99)
HCO3 BLDV-SCNC: 24 MMOL/L — SIGNIFICANT CHANGE UP (ref 21–29)
HCT VFR BLD CALC: 23.3 % — LOW (ref 39–50)
HCT VFR BLDA CALC: 23 % — LOW (ref 39–50)
HGB BLD CALC-MCNC: 7.5 G/DL — LOW (ref 13–17)
HGB BLD-MCNC: 7.6 G/DL — LOW (ref 13–17)
HOROWITZ INDEX BLDV+IHG-RTO: 50 — SIGNIFICANT CHANGE UP
LACTATE BLDV-MCNC: 1.4 MMOL/L — SIGNIFICANT CHANGE UP (ref 0.7–2)
MAGNESIUM SERPL-MCNC: 1.8 MG/DL — SIGNIFICANT CHANGE UP (ref 1.6–2.6)
MCHC RBC-ENTMCNC: 32.6 GM/DL — SIGNIFICANT CHANGE UP (ref 32–36)
MCHC RBC-ENTMCNC: 33 PG — SIGNIFICANT CHANGE UP (ref 27–34)
MCV RBC AUTO: 101.3 FL — HIGH (ref 80–100)
NRBC # BLD: 8 /100 WBCS — HIGH (ref 0–0)
OTHER CELLS CSF MANUAL: 7 ML/DL — LOW (ref 18–23)
PCO2 BLDV: 49 MMHG — SIGNIFICANT CHANGE UP (ref 35–50)
PH BLDV: 7.31 — LOW (ref 7.35–7.45)
PLATELET # BLD AUTO: 126 K/UL — LOW (ref 150–400)
PO2 BLDV: 41 MMHG — SIGNIFICANT CHANGE UP (ref 25–45)
POTASSIUM BLDV-SCNC: 3.7 MMOL/L — SIGNIFICANT CHANGE UP (ref 3.5–5.3)
POTASSIUM SERPL-MCNC: 4 MMOL/L — SIGNIFICANT CHANGE UP (ref 3.5–5.3)
POTASSIUM SERPL-SCNC: 4 MMOL/L — SIGNIFICANT CHANGE UP (ref 3.5–5.3)
RBC # BLD: 2.3 M/UL — LOW (ref 4.2–5.8)
RBC # FLD: 23.7 % — HIGH (ref 10.3–14.5)
SAO2 % BLDV: 68 % — SIGNIFICANT CHANGE UP (ref 67–88)
SODIUM SERPL-SCNC: 143 MMOL/L — SIGNIFICANT CHANGE UP (ref 135–145)
TRIGL SERPL-MCNC: 573 MG/DL — HIGH (ref 10–149)
WBC # BLD: 19.12 K/UL — HIGH (ref 3.8–10.5)
WBC # FLD AUTO: 19.12 K/UL — HIGH (ref 3.8–10.5)

## 2020-09-07 PROCEDURE — 99291 CRITICAL CARE FIRST HOUR: CPT

## 2020-09-07 PROCEDURE — 99231 SBSQ HOSP IP/OBS SF/LOW 25: CPT

## 2020-09-07 PROCEDURE — 71045 X-RAY EXAM CHEST 1 VIEW: CPT | Mod: 26

## 2020-09-07 PROCEDURE — 71045 X-RAY EXAM CHEST 1 VIEW: CPT | Mod: 26,77

## 2020-09-07 RX ORDER — POTASSIUM CHLORIDE 20 MEQ
10 PACKET (EA) ORAL
Refills: 0 | Status: COMPLETED | OUTPATIENT
Start: 2020-09-07 | End: 2020-09-07

## 2020-09-07 RX ORDER — FUROSEMIDE 40 MG
80 TABLET ORAL ONCE
Refills: 0 | Status: COMPLETED | OUTPATIENT
Start: 2020-09-07 | End: 2020-09-07

## 2020-09-07 RX ORDER — FUROSEMIDE 40 MG
40 TABLET ORAL EVERY 12 HOURS
Refills: 0 | Status: DISCONTINUED | OUTPATIENT
Start: 2020-09-07 | End: 2020-09-07

## 2020-09-07 RX ORDER — FUROSEMIDE 40 MG
5 TABLET ORAL
Qty: 500 | Refills: 0 | Status: DISCONTINUED | OUTPATIENT
Start: 2020-09-07 | End: 2020-09-13

## 2020-09-07 RX ORDER — ELECTROLYTE SOLUTION,INJ
1 VIAL (ML) INTRAVENOUS
Refills: 0 | Status: DISCONTINUED | OUTPATIENT
Start: 2020-09-07 | End: 2020-09-07

## 2020-09-07 RX ADMIN — HEPARIN SODIUM 5000 UNIT(S): 5000 INJECTION INTRAVENOUS; SUBCUTANEOUS at 05:41

## 2020-09-07 RX ADMIN — Medication 8 MILLIGRAM(S): at 09:00

## 2020-09-07 RX ADMIN — Medication 50 MILLIEQUIVALENT(S): at 02:12

## 2020-09-07 RX ADMIN — Medication 2.5 MG/HR: at 13:58

## 2020-09-07 RX ADMIN — CHLORHEXIDINE GLUCONATE 1 APPLICATION(S): 213 SOLUTION TOPICAL at 05:40

## 2020-09-07 RX ADMIN — Medication 50 MILLIGRAM(S): at 14:02

## 2020-09-07 RX ADMIN — Medication 500 MILLIGRAM(S): at 23:59

## 2020-09-07 RX ADMIN — CHLORHEXIDINE GLUCONATE 1 APPLICATION(S): 213 SOLUTION TOPICAL at 22:54

## 2020-09-07 RX ADMIN — Medication 500 MILLIGRAM(S): at 09:27

## 2020-09-07 RX ADMIN — Medication 500 MILLIGRAM(S): at 06:40

## 2020-09-07 RX ADMIN — Medication 50 MILLIGRAM(S): at 05:41

## 2020-09-07 RX ADMIN — Medication 1 EACH: at 17:05

## 2020-09-07 RX ADMIN — Medication 80 MILLIGRAM(S): at 13:42

## 2020-09-07 RX ADMIN — HEPARIN SODIUM 5000 UNIT(S): 5000 INJECTION INTRAVENOUS; SUBCUTANEOUS at 13:42

## 2020-09-07 RX ADMIN — Medication 50 MILLIGRAM(S): at 21:21

## 2020-09-07 RX ADMIN — Medication 40 MILLIGRAM(S): at 09:27

## 2020-09-07 RX ADMIN — Medication 500 MILLIGRAM(S): at 01:10

## 2020-09-07 RX ADMIN — Medication 2.5 MG/HR: at 23:59

## 2020-09-07 RX ADMIN — CYCLOSPORINE 2.1 MILLIGRAM(S): 100 CAPSULE ORAL at 11:07

## 2020-09-07 RX ADMIN — Medication 500 MILLIGRAM(S): at 01:09

## 2020-09-07 RX ADMIN — INSULIN HUMAN 1 UNIT(S)/HR: 100 INJECTION, SOLUTION SUBCUTANEOUS at 23:00

## 2020-09-07 RX ADMIN — Medication 500 MILLIGRAM(S): at 13:41

## 2020-09-07 RX ADMIN — PANTOPRAZOLE SODIUM 40 MILLIGRAM(S): 20 TABLET, DELAYED RELEASE ORAL at 11:48

## 2020-09-07 RX ADMIN — HEPARIN SODIUM 5000 UNIT(S): 5000 INJECTION INTRAVENOUS; SUBCUTANEOUS at 21:21

## 2020-09-07 RX ADMIN — Medication 50 MILLIEQUIVALENT(S): at 01:10

## 2020-09-07 RX ADMIN — Medication 500 MILLIGRAM(S): at 21:21

## 2020-09-07 RX ADMIN — Medication 500 MILLIGRAM(S): at 18:29

## 2020-09-07 NOTE — PROGRESS NOTE ADULT - PROBLEM SELECTOR PLAN 2
- We will adjust IV cyclosporine as necessary. Currently at 15mg/24hours to reduce overall level of immunosuppression. Everolimus level was 5.3 on 8/31 and has been discontinued.     - Continue current methylprednisone dose (equivalent to 10 mg daily of prednisone). This was decreased on 9/5.  - Hypertension currently controlled with hydralazine 50 mg PO TID.  - Will need to resume statin and aspirin when stable.

## 2020-09-07 NOTE — PROGRESS NOTE ADULT - SUBJECTIVE AND OBJECTIVE BOX
[facility] NUTRITION SUPPORT / TPN -- FOLLOW UP NOTE  --------------------------------------------------------------------------------    24 hour events/subjective:  Tolerating clear liquids without N/V.  +BM's (formed).  Started on trickle TF, tolerating.     Diet:  Diet, Clear Liquid:   Low Sodium  Tube Feeding Modality: Nasogastric  Glucerna 1.5 S[place]ail (GLUCERNA1.5RTH)  Total Volume for 24 Hours (mL): 240  Continuous  Starting Tube Feed Rate mL per Hour: 10  Until Goal Tube Feed Rate (mL per Hour): 10  Tube Feed Duration (in Hours): 24  Tube Feed Start Time: 11:10 (09-06-20 @ 11:04)      PAST HISTORY  --------------------------------------------------------------------------------  No significant changes to PMH, PSH, FHx, SHx, unless otherwise noted    ALLERGIES & MEDICATIONS  --------------------------------------------------------------------------------  Allergies    No Known Allergies    Intolerances      Standing Inpatient Medications  chlorhexidine 2% Cloths 1 Application(s) Topical <User Schedule>  cycloSPORINE  (SandIMMUNE) IVPB 15 milliGRAM(s) IV Intermittent every 24 hours  dexMEDEtomidine Infusion 0.25 MICROgram(s)/kG/Hr IV Continuous <Continuous>  fat emulsion (Fish Oil and Plant Based) 20% Infusion 20.8 mL/Hr IV Continuous <Continuous>  furosemide   Injectable 40 milliGRAM(s) IV Push every 12 hours  heparin   Injectable 5000 Unit(s) SubCutaneous every 8 hours  hydrALAZINE 50 milliGRAM(s) Oral every 8 hours  insulin regular Infusion 1 Unit(s)/Hr IV Continuous <Continuous>  methylPREDNISolone sodium succinate Injectable 8 milliGRAM(s) IV Push <User Schedule>  pantoprazole  Injectable 40 milliGRAM(s) IV Push daily  Parenteral Nutrition - Adult 1 Each TPN Continuous <Continuous>  vancomycin    Solution 500 milliGRAM(s) Oral every 6 hours  vancomycin  Retention Enema 500 milliGRAM(s) Rectal every 6 hours    PRN Inpatient Medications  benzocaine 15 mG/menthol 3.6 mG (Sugar-Free) Lozenge 1 Lozenge Oral every 6 hours PRN      VITALS/PHYSICAL EXAM  --------------------------------------------------------------------------------  T(C): 36.7 (09-07-20 @ 04:00), Max: 36.7 (09-07-20 @ 04:00)  HR: 104 (09-07-20 @ 09:00) (90 - 124)  BP: 111/71 (09-07-20 @ 09:00) (99/63 - 134/65)  RR: 22 (09-07-20 @ 09:00) (11 - 28)  SpO2: 98% (09-07-20 @ 09:00) (97% - 100%)  Wt(kg): --        09-06-20 @ 07:01  -  09-07-20 @ 07:00  --------------------------------------------------------  IN: 3949.5 mL / OUT: 3510 mL / NET: 439.5 mL    09-07-20 @ 07:01  -  09-07-20 @ 09:42  --------------------------------------------------------  IN: 294.6 mL / OUT: 175 mL / NET: 119.6 mL          PHYSICAL EXAM:  GEN:  NAD, abd softly distended, NT  HEENT: NC/AT, PERRL, mucosa moist & throat clear, no trachea midline w/ neck supple  GI: (-) BS, firm, distended/ nontender (+)tympanitic   MSK:  FROM x4, no deformities  VASCULAR: Equally warm, no cyanosis, clubbing,        Lt IJ TLC &  LUE PICC w/ dressing c/d/i,         BLE mild edema equal,  R>LUE edema- soft nontender w/o cord or erythema  Neurology; no focal deficits, weakened strength & sensation grossly intact  Psych: normal affect  Skin: warm,  moist, & w/ good turgor        LABS/STUDIES  --------------------------------------------------------------------------------              7.6    19.12 >-----------<  126      [09-07-20 @ 00:40]              23.3     143  |  111  |  90  ----------------------------<  131      [09-07-20 @ 00:40]  4.0   |  23  |  1.17        Ca     7.8     [09-07-20 @ 00:40]      Mg     1.8     [09-07-20 @ 00:40]      Phos  2.9     [09-06-20 @ 06:34]    TPro  6.0  /  Alb  1.8  /  TBili  1.0  /  DBili  x   /  AST  56  /  ALT  31  /  AlkPhos  96  [09-06-20 @ 06:34]    PT/INR: PT 12.6 , INR 1.06       [09-06-20 @ 06:34]  PTT: 49.2       [09-06-20 @ 06:34]      Ca ionizedBlood Gas Arterial - Calcium, Ionized: 1.20 mmoL/L (09-06-20 @ 06:31)  Blood Gas Calcium, Ionized - Venous: 1.19 mmoL/L (09-07-20 @ 00:34)    Creatinine Trend:  POC glucoseGlucose, Serum: 131 mg/dL (09-07-20 @ 00:40)  CAPILLARY BLOOD GLUCOSE  110 (07 Sep 2020 08:00)  168 (07 Sep 2020 05:00)  143 (07 Sep 2020 04:00)  141 (07 Sep 2020 03:00)  132 (07 Sep 2020 01:00)  92 (06 Sep 2020 23:00)  108 (06 Sep 2020 22:00)  130 (06 Sep 2020 21:00)  139 (06 Sep 2020 20:00)  116 (06 Sep 2020 10:00)      POCT Blood Glucose.: 123 mg/dL (07 Sep 2020 09:16)  POCT Blood Glucose.: 110 mg/dL (07 Sep 2020 08:12)  POCT Blood Glucose.: 122 mg/dL (07 Sep 2020 06:53)  POCT Blood Glucose.: 132 mg/dL (07 Sep 2020 06:22)  POCT Blood Glucose.: 161 mg/dL (07 Sep 2020 05:24)  POCT Blood Glucose.: 143 mg/dL (07 Sep 2020 04:15)  POCT Blood Glucose.: 141 mg/dL (07 Sep 2020 03:02)  POCT Blood Glucose.: 123 mg/dL (07 Sep 2020 02:15)  POCT Blood Glucose.: 92 mg/dL (06 Sep 2020 23:02)  POCT Blood Glucose.: 108 mg/dL (06 Sep 2020 22:02)  POCT Blood Glucose.: 130 mg/dL (06 Sep 2020 21:12)  POCT Blood Glucose.: 139 mg/dL (06 Sep 2020 20:19)  POCT Blood Glucose.: 185 mg/dL (06 Sep 2020 18:44)  POCT Blood Glucose.: 208 mg/dL (06 Sep 2020 18:06)  POCT Blood Glucose.: 129 mg/dL (06 Sep 2020 16:24)  POCT Blood Glucose.: 99 mg/dL (06 Sep 2020 14:47)  POCT Blood Glucose.: 123 mg/dL (06 Sep 2020 13:36)  POCT Blood Glucose.: 125 mg/dL (06 Sep 2020 12:13)  POCT Blood Glucose.: 116 mg/dL (06 Sep 2020 10:29)    PrealbuminPrealbumin, Serum: 10 mg/dL (09-02-20 @ 08:12)  Prealbumin, Serum: 24 mg/dL (08-25-20 @ 15:10)    Triglycerides

## 2020-09-07 NOTE — PROGRESS NOTE ADULT - SUBJECTIVE AND OBJECTIVE BOX
Subjective: He seems to feel better today. He has no complaints and no abdominal pain. No shortness of breath.     Medications:  benzocaine 15 mG/menthol 3.6 mG (Sugar-Free) Lozenge 1 Lozenge Oral every 6 hours PRN  chlorhexidine 2% Cloths 1 Application(s) Topical <User Schedule>  cycloSPORINE  (SandIMMUNE) IVPB 15 milliGRAM(s) IV Intermittent every 24 hours  dexMEDEtomidine Infusion 0.25 MICROgram(s)/kG/Hr IV Continuous <Continuous>  fat emulsion (Fish Oil and Plant Based) 20% Infusion 20.8 mL/Hr IV Continuous <Continuous>  heparin   Injectable 5000 Unit(s) SubCutaneous every 8 hours  hydrALAZINE 50 milliGRAM(s) Oral every 8 hours  insulin regular Infusion 1 Unit(s)/Hr IV Continuous <Continuous>  methylPREDNISolone sodium succinate Injectable 8 milliGRAM(s) IV Push <User Schedule>  pantoprazole  Injectable 40 milliGRAM(s) IV Push daily  Parenteral Nutrition - Adult 1 Each TPN Continuous <Continuous>  vancomycin    Solution 500 milliGRAM(s) Oral every 6 hours  vancomycin  Retention Enema 500 milliGRAM(s) Rectal every 6 hours      Physical Exam:    Vital Signs Last 24 Hrs  T(C): 36.7 (07 Sep 2020 04:00), Max: 36.7 (07 Sep 2020 04:00)  T(F): 98 (07 Sep 2020 04:00), Max: 98 (07 Sep 2020 04:00)  HR: 104 (07 Sep 2020 06:00) (90 - 124)  BP: 101/70 (07 Sep 2020 06:00) (99/63 - 134/65)  BP(mean): 81 (07 Sep 2020 06:00) (75 - 105)  RR: 25 (07 Sep 2020 06:00) (11 - 25)  SpO2: 100% (07 Sep 2020 06:00) (97% - 100%)       Daily Weight in k.6 (07 Sep 2020 00:00)    I&O's Summary    06 Sep 2020 07:01  -  07 Sep 2020 07:00  --------------------------------------------------------  IN: 3940.1 mL / OUT: 3320 mL / NET: 620.1 mL    General: No distress. Comfortable.  HEENT: EOM intact.  Neck: Neck supple. JVP mildly elevated. No masses  Chest: Clear to auscultation bilaterally.  CV: Tachycardic, regular. Normal S1 and S2. III/VI combined systolic and diastolic murmur heard across precordium. No rubs or gallops. Radial pulses weak. Anasarca with more prominent right arm swelling.   Abdomen: Distended, but compressible. Non-tender. Prominent bowel sounds.   Skin: No rashes  Neurology: Alert and oriented. Sensation intact  Psych: Affect normal    Labs:                        7.6    19.12 )-----------( 126      ( 07 Sep 2020 00:40 )             23.3     09-    143  |  111<H>  |  90<H>  ----------------------------<  131<H>  4.0   |  23  |  1.17    Ca    7.8<L>      07 Sep 2020 00:40  Phos  2.9     -  Mg     1.8     -    TPro  6.0  /  Alb  1.8<L>  /  TBili  1.0  /  DBili  x   /  AST  56<H>  /  ALT  31  /  AlkPhos  96  09-06    PT/INR - ( 06 Sep 2020 06:34 )   PT: 12.6 sec;   INR: 1.06 ratio    PTT - ( 06 Sep 2020 06:34 )  PTT:49.2 sec    Total Cholesterol: --  LDL: --  HDL: --  T

## 2020-09-07 NOTE — PROGRESS NOTE ADULT - PROBLEM SELECTOR PLAN 3
- Resolved. He is responding well to IV diuretics. We will continue to give diuretics in order to mobilize and resolve his anasarca.

## 2020-09-07 NOTE — PROGRESS NOTE ADULT - SUBJECTIVE AND OBJECTIVE BOX
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 Patient was treated w/ prednisone and Rituximab. Tacro was discontinued and patient was also transitioned to everolimus  Admitted  6/16-6/19  for hyperglycemia.  Reported to transplant team having one done black tarry stool-  instructed to  present to [facility] for hemolytic anemia. Patient has been following with Hematology outpatient.  Denies CP  N/V diarrhea SOB. (11 Aug 2020 20:08)      Surgery/Hospital Course:  8/11 Admitted to [facility] with symptomatic anemia  8/13 EGD for investigation of tarry stools  8/15 SB capsule: stomach & SB lesions, likely ulcers.  8/17 EGD/push enteroscopy w/ angioectasias/erosions in small bowel. Gastropathy and esophagitis. Ulcer seen on VCE most likely scope trauma.    8/18 VSS transferred back to floor.   8/20 VS 9.0/28.4 stable Gastric biopsy results LA Grade A esophagitis.  - Acute gastritis. Biopsies taken to r/o CMV, No ulceration seen despite finding on capsule endoscopy - likely 2/2 scope  trauma that has since resolved. Pathology pending.  8/21 VSS H/H  8.1/25.7  trending down will monitor prednisone taper 30 mg today. Procardia 120 initiated today RHC biopsy Monday.   8/22 VVS; Patient reports loose stools x 2-3 days with 1 episode today.  Stool sent for C-dif and GI PCR. Abdominal X-ray revealed: dilated loops of bowel. Abdomen distended.  Patient denies N/V. GI called to re-evaluate. Continue with current medication regimen.  Awaiting for upcoming cardiac biopsy on Monday 8/24.  8/23   vss    creat up to 2.28 ,  repeating CMP,  TTE ordered  Bladder scan   8/24   cardiac bx cancelled   elev creat  to 2.74   cardio renal cslt called,    + CDIF   inc vanco to 500 q6   ID following  8/25 VSS: RHC today as per transplant team; transfuse 2 units PRBC today as per Dr. Viramontes; continue vanco for cdiff; transfer patient to CTU after cath today   8/26. Diuretic challenge with good response   8/27: Downgraded to the floor then upgraded to the CTU again for concern of abd distention   8/28 CT ABD & Pelvis reveals pancolitis  8/30: repeat CT scan of abd, gen surgery called to re-evaluated pt.  8/31: RUE duplex negative for DVT  9/4 NGT clamped, minimal residual. NGT removed. started sips   9/6 tolerating clear liquid diet    Today:    REVIEW OF SYSTEMS:  Gen: No fever  EYES/ENT: No visual changes;  No vertigo or throat pain   NECK: No pain   RES:  No shortness of breath or Cough  CARD: No chest pain   GI: No abdominal pain  : No dysuria  NEURO: No weakness  SKIN: No itching, rashes     ICU Vital Signs Last 24 Hrs  T(C): 36.7 (07 Sep 2020 04:00), Max: 36.7 (07 Sep 2020 04:00)  T(F): 98 (07 Sep 2020 04:00), Max: 98 (07 Sep 2020 04:00)  HR: 104 (07 Sep 2020 09:00) (90 - 124)  BP: 111/71 (07 Sep 2020 09:00) (99/63 - 134/65)  BP(mean): 85 (07 Sep 2020 09:00) (75 - 103)  ABP: --  ABP(mean): --  RR: 22 (07 Sep 2020 09:00) (11 - 28)  SpO2: 98% (07 Sep 2020 09:00) (97% - 100%)      Physical Exam:  Gen:  Awake, alert, no acute distress  CNS: non focal 	  Neck: no JVD  RES :  dec BS b/l, crackles at bases, no wheezing              CVS: RRR Normal S1/S2, +2 edema b/l LE  Abd: distended but soft, non-tender, no rebound tenderness. Bowel sounds present  Skin: No rash.    ============================I/O===========================   I&O's Detail    06 Sep 2020 07:01  -  07 Sep 2020 07:00  --------------------------------------------------------  IN:    dexmedetomidine Infusion: 178.6 mL    fat emulsion (Fish Oil and Plant Based) 20% Infusion: 266.5 mL    Glucerna 1.5: 140 mL    insulin regular Infusion: 64 mL    Solution: 50.4 mL    Solution: 250 mL    TPN (Total Parenteral Nutrition): 3000 mL  Total IN: 3949.5 mL    OUT:    Indwelling Catheter - Urethral: 3510 mL  Total OUT: 3510 mL    Total NET: 439.5 mL      07 Sep 2020 07:01  -  07 Sep 2020 09:57  --------------------------------------------------------  IN:    dexmedetomidine Infusion: 14.4 mL    Glucerna 1.5: 20 mL    insulin regular Infusion: 6 mL    Solution: 4.2 mL    TPN (Total Parenteral Nutrition): 250 mL  Total IN: 294.6 mL    OUT:    Indwelling Catheter - Urethral: 175 mL  Total OUT: 175 mL    Total NET: 119.6 mL        ============================ LABS =========================                        7.6    19.12 )-----------( 126      ( 07 Sep 2020 00:40 )             23.3     09-07    143  |  111<H>  |  90<H>  ----------------------------<  131<H>  4.0   |  23  |  1.17    Ca    7.8<L>      07 Sep 2020 00:40  Phos  2.9     09-06  Mg     1.8     09-07    TPro  6.0  /  Alb  1.8<L>  /  TBili  1.0  /  DBili  x   /  AST  56<H>  /  ALT  31  /  AlkPhos  96  09-06    LIVER FUNCTIONS - ( 06 Sep 2020 06:34 )  Alb: 1.8 g/dL / Pro: 6.0 g/dL / ALK PHOS: 96 U/L / ALT: 31 U/L / AST: 56 U/L / GGT: x           PT/INR - ( 06 Sep 2020 06:34 )   PT: 12.6 sec;   INR: 1.06 ratio         PTT - ( 06 Sep 2020 06:34 )  PTT:49.2 sec  ABG - ( 06 Sep 2020 06:31 )  pH, Arterial: 7.40  pH, Blood: x     /  pCO2: 39    /  pO2: 137   / HCO3: 24    / Base Excess: x     /  SaO2: 99                  ======================Micro/Rad/Cardio=================  Culture: Reviewed   CXR: Reviewed  Echo:Reviewed  ======================================================  PAST MEDICAL & SURGICAL HISTORY:  H/O hemolytic anemia  H/O autoimmune hemolytic anemia  Knee pain, right  HLD (hyperlipidemia)  Former smoker  DVT of upper extremity (deep vein thrombosis)  Hepatitis C virus  GIB (gastrointestinal bleeding)  Ventricular fibrillation: s/p AICD  PAF (paroxysmal atrial fibrillation): on xarelto  Non-Ischemic Cardiomyopathy  SVT (Supraventricular Tachycardia)  HTN  CHF (Congestive Heart Failure)  S/P right heart catheterization: biopsy multiple  H/O heart transplant: 2/2018  Status post left hip replacement  History of Prior Ablation Treatment: for afib  AICD (Automatic Cardioverter/Defibrillator) Present: [employer] with 1 [employer] lead4/1/09- explanted and replaced with Medtronic 2 leads on 9/2/09    ====================ASSESSMENT ==============  Heart transplant 2/2018 for ACC/AHA stage D NICM   Resolved GI Bleed, Melena s/p EGD  Acute respiratory failure, resolved  Supraventricular tachycardia  Severe hypertension  Hyperlactatemia acidosis, resolved  Leukopenia- resolved  Acute blood loss anemia, stable   CKD Stage III  C. diff diarrhea  Klebsiella oxytoca bacteremia  Sepsis  Azotemia 2/2 CKD and Steroids   Pancolitis        Plan:  ====================== NEUROLOGY=====================  Sedated with Precedex drip   Continue to monitor neuro status     dexMEDEtomidine Infusion 0.25 MICROgram(s)/kG/Hr (4.7 mL/Hr) IV Continuous <Continuous>    ==================== RESPIRATORY======================  On supplemental O2 therapy via 2L NC, wean off as tolerated   Encourage incentive spirometry, continue pulse ox monitoring, follow ABGs.   Sono shows atelectasis, no pleural effusion b/l.  Encourage chest PT, PT, OOB to chair  BIPAP PRN for resp support/ positive pressure, atelectasis    ====================CARDIOVASCULAR==================  Transplant team following.   Heart transplant 2/2018 for ACC/AHA stage D NICM, now on solumedrol from prednisone   (NPO for abd distention) off everolimus. Off cellcept while on high dose steroids.   Continue hemodynamic monitoring.   ASA on hold for hx of GI bleed / ulceration.   Hypertensive, uptitrate HDZN as tolerates    hydrALAZINE 50 milliGRAM(s) Oral every 8 hours  methylPREDNISolone sodium succinate Injectable 8 milliGRAM(s) IV Push <User Schedule>    ===================HEMATOLOGIC/ONC ===================  Anemia s/p multiple blood transfusions. Continue closely monitoring H&H and transfuse prn.   GI Bleeding ruled out s/p EGD, found to have ulcers. F/u heme recs for hx of autoimmune hemolytic anemia.      VTE prophylaxis, heparin   Injectable 5000 Unit(s) SubCutaneous every 8 hours    ===================== RENAL =========================  NORMAN on CKD stage 3, Anasarca. Diuresis with Lasix   Continue monitoring urine output, BUN/Creatinine, I/Os and replete electrolytes prn.     furosemide   Injectable 40 milliGRAM(s) IV Push every 12 hours    ==================== GASTROINTESTINAL===================  Nutrition following, continue clear liquid diet   Ileus present in setting of Cdiff.- NGT clamped 9/4 for 4 hrs, no residual. NGT removed, stared on sips, c/w TPN for now.  CT A/P on 8/30 with Pancolitis and small volume abdominopelvic ascites, not significantly changed since 8/20/2020.  General surgery following-continue serial abdominal exams. No surgical intervention at this time.     GI prophylaxis, pantoprazole  Injectable 40 milliGRAM(s) IV Push daily  Parenteral Nutrition - Adult 1 Each (125 mL/Hr) TPN Continuous <Continuous>    =======================    ENDOCRINE  =====================  Glucose control with insulin drip for stress hyperglycemia     insulin regular Infusion 1 Unit(s)/Hr (1 mL/Hr) IV Continuous <Continuous>    ========================INFECTIOUS DISEASE================  Klebsiella bacteremia from 8/13 which has since cleared  Clostridium difficile PCR positive, continue with vancomycin PO and Rectal Vanco   c/w eravacycline for CDiff   Transplant prophylaxis with Posaconazole and Atovaquone on hold duo to ileus   Dr. Lujan following, no need for additional abx at this time. Monitor for now.     vancomycin    Solution 500 milliGRAM(s) Oral every 6 hours  vancomycin  Retention Enema 500 milliGRAM(s) Rectal every 6 hours      Patient requires continuous monitoring with bedside rhythm monitoring, pulse ox monitoring, and intermittent blood gas analysis. Care plan discussed with ICU care team. Patient remained critical and at risk for life threatening decompensation.     By signing my name below, I, Ronit Joe, attest that this documentation has been prepared under the direction and in the presence of SHELLY Garcia   Electronically signed: Katty Gaffney, 09-07-20 @ 09:57    I, Mak Villagran, personally performed the services described in this documentation. all medical record entries made by the ramuibe were at my direction and in my presence. 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 Patient was treated w/ prednisone and Rituximab. Tacro was discontinued and patient was also transitioned to everolimus  Admitted  6/16-6/19  for hyperglycemia.  Reported to transplant team having one done black tarry stool-  instructed to  present to [facility] for hemolytic anemia. Patient has been following with Hematology outpatient.  Denies CP  N/V diarrhea SOB. (11 Aug 2020 20:08)      Surgery/Hospital Course:  8/11 Admitted to [facility] with symptomatic anemia  8/13 EGD for investigation of tarry stools  8/15 SB capsule: stomach & SB lesions, likely ulcers.  8/17 EGD/push enteroscopy w/ angioectasias/erosions in small bowel. Gastropathy and esophagitis. Ulcer seen on VCE most likely scope trauma.    8/18 VSS transferred back to floor.   8/20 VS 9.0/28.4 stable Gastric biopsy results LA Grade A esophagitis.  - Acute gastritis. Biopsies taken to r/o CMV, No ulceration seen despite finding on capsule endoscopy - likely 2/2 scope  trauma that has since resolved. Pathology pending.  8/21 VSS H/H  8.1/25.7  trending down will monitor prednisone taper 30 mg today. Procardia 120 initiated today RHC biopsy Monday.   8/22 VVS; Patient reports loose stools x 2-3 days with 1 episode today.  Stool sent for C-dif and GI PCR. Abdominal X-ray revealed: dilated loops of bowel. Abdomen distended.  Patient denies N/V. GI called to re-evaluate. Continue with current medication regimen.  Awaiting for upcoming cardiac biopsy on Monday 8/24.  8/23   vss    creat up to 2.28 ,  repeating CMP,  TTE ordered  Bladder scan   8/24   cardiac bx cancelled   elev creat  to 2.74   cardio renal cslt called,    + CDIF   inc vanco to 500 q6   ID following  8/25 VSS: RHC today as per transplant team; transfuse 2 units PRBC today as per Dr. Viramontes; continue vanco for cdiff; transfer patient to CTU after cath today   8/26. Diuretic challenge with good response   8/27: Downgraded to the floor then upgraded to the CTU again for concern of abd distention   8/28 CT ABD & Pelvis reveals pancolitis  8/30: repeat CT scan of abd, gen surgery called to re-evaluated pt.  8/31: RUE duplex negative for DVT  9/4 NGT clamped, minimal residual. NGT removed. started sips   9/6 tolerating clear liquid diet    Today: OOB to chair, aggressive chest PT, + Lasix gtt, goal net negative 1 L    REVIEW OF SYSTEMS:  Gen: No fever  EYES/ENT: No visual changes;  No vertigo or throat pain   NECK: No pain   RES:  No shortness of breath or Cough  CARD: No chest pain   GI: No abdominal pain  : No dysuria  NEURO: No weakness  SKIN: No itching, rashes     ICU Vital Signs Last 24 Hrs  T(C): 36.7 (07 Sep 2020 04:00), Max: 36.7 (07 Sep 2020 04:00)  T(F): 98 (07 Sep 2020 04:00), Max: 98 (07 Sep 2020 04:00)  HR: 104 (07 Sep 2020 09:00) (90 - 124)  BP: 111/71 (07 Sep 2020 09:00) (99/63 - 134/65)  BP(mean): 85 (07 Sep 2020 09:00) (75 - 103)  ABP: --  ABP(mean): --  RR: 22 (07 Sep 2020 09:00) (11 - 28)  SpO2: 98% (07 Sep 2020 09:00) (97% - 100%)      Physical Exam:  Gen:  Awake, alert, no acute distress  CNS: non focal 	  Neck: no JVD  RES :  dec BS b/l, crackles at bases, no wheezing              CVS: RRR Normal S1/S2, +2 edema b/l LE  Abd: distended but soft, non-tender, no rebound tenderness. Bowel sounds present  Skin: No rash.    ============================I/O===========================   I&O's Detail    06 Sep 2020 07:01  -  07 Sep 2020 07:00  --------------------------------------------------------  IN:    dexmedetomidine Infusion: 178.6 mL    fat emulsion (Fish Oil and Plant Based) 20% Infusion: 266.5 mL    Glucerna 1.5: 140 mL    insulin regular Infusion: 64 mL    Solution: 50.4 mL    Solution: 250 mL    TPN (Total Parenteral Nutrition): 3000 mL  Total IN: 3949.5 mL    OUT:    Indwelling Catheter - Urethral: 3510 mL  Total OUT: 3510 mL    Total NET: 439.5 mL      07 Sep 2020 07:01  -  07 Sep 2020 09:57  --------------------------------------------------------  IN:    dexmedetomidine Infusion: 14.4 mL    Glucerna 1.5: 20 mL    insulin regular Infusion: 6 mL    Solution: 4.2 mL    TPN (Total Parenteral Nutrition): 250 mL  Total IN: 294.6 mL    OUT:    Indwelling Catheter - Urethral: 175 mL  Total OUT: 175 mL    Total NET: 119.6 mL        ============================ LABS =========================                        7.6    19.12 )-----------( 126      ( 07 Sep 2020 00:40 )             23.3     09-07    143  |  111<H>  |  90<H>  ----------------------------<  131<H>  4.0   |  23  |  1.17    Ca    7.8<L>      07 Sep 2020 00:40  Phos  2.9     09-06  Mg     1.8     09-07    TPro  6.0  /  Alb  1.8<L>  /  TBili  1.0  /  DBili  x   /  AST  56<H>  /  ALT  31  /  AlkPhos  96  09-06    LIVER FUNCTIONS - ( 06 Sep 2020 06:34 )  Alb: 1.8 g/dL / Pro: 6.0 g/dL / ALK PHOS: 96 U/L / ALT: 31 U/L / AST: 56 U/L / GGT: x           PT/INR - ( 06 Sep 2020 06:34 )   PT: 12.6 sec;   INR: 1.06 ratio         PTT - ( 06 Sep 2020 06:34 )  PTT:49.2 sec  ABG - ( 06 Sep 2020 06:31 )  pH, Arterial: 7.40  pH, Blood: x     /  pCO2: 39    /  pO2: 137   / HCO3: 24    / Base Excess: x     /  SaO2: 99                  ======================Micro/Rad/Cardio=================  Culture: Reviewed   CXR: Reviewed  Echo:Reviewed  ======================================================  PAST MEDICAL & SURGICAL HISTORY:  H/O hemolytic anemia  H/O autoimmune hemolytic anemia  Knee pain, right  HLD (hyperlipidemia)  Former smoker  DVT of upper extremity (deep vein thrombosis)  Hepatitis C virus  GIB (gastrointestinal bleeding)  Ventricular fibrillation: s/p AICD  PAF (paroxysmal atrial fibrillation): on xarelto  Non-Ischemic Cardiomyopathy  SVT (Supraventricular Tachycardia)  HTN  CHF (Congestive Heart Failure)  S/P right heart catheterization: biopsy multiple  H/O heart transplant: 2/2018  Status post left hip replacement  History of Prior Ablation Treatment: for afib  AICD (Automatic Cardioverter/Defibrillator) Present: [employer] with 1 [employer] [place] lead4/1/09- explanted and replaced with Medtronic 2 leads on 9/2/09    ====================ASSESSMENT ==============  Heart transplant 2/2018 for ACC/AHA stage D NICM   Resolved GI Bleed, Melena s/p EGD  Acute respiratory failure, resolved  Supraventricular tachycardia  Severe hypertension  Hyperlactatemia acidosis, resolved  Leukopenia- resolved  Acute blood loss anemia, stable   CKD Stage III  C. diff diarrhea  Klebsiella oxytoca bacteremia  Sepsis  Azotemia 2/2 CKD and Steroids   Pancolitis        Plan:  ====================== NEUROLOGY=====================  Continue to monitor neuro status     dexMEDEtomidine Infusion 0.25 MICROgram(s)/kG/Hr (4.7 mL/Hr) IV Continuous <Continuous>    ==================== RESPIRATORY======================  On supplemental O2 therapy via 2L NC, wean off as tolerated   Encourage incentive spirometry, continue pulse ox monitoring, follow ABGs.   Sono shows atelectasis, no pleural effusion b/l.  Encourage chest PT, PT, OOB to chair  BIPAP PRN for resp support/ positive pressure, atelectasis    ====================CARDIOVASCULAR==================  Transplant team following.   Heart transplant 2/2018 for ACC/AHA stage D NICM, now on solumedrol from prednisone   (NPO for abd distention) off everolimus. Off cellcept while on high dose steroids.   Continue hemodynamic monitoring.   ASA on hold for hx of GI bleed / ulceration.   hydralazine for MAP 70-80    hydrALAZINE 50 milliGRAM(s) Oral every 8 hours  methylPREDNISolone sodium succinate Injectable 8 milliGRAM(s) IV Push <User Schedule>    ===================HEMATOLOGIC/ONC ===================  Anemia s/p multiple blood transfusions. Continue closely monitoring H&H and transfuse prn.   GI Bleeding ruled out s/p EGD, found to have ulcers. F/u heme recs for hx of autoimmune hemolytic anemia.      VTE prophylaxis, heparin   Injectable 5000 Unit(s) SubCutaneous every 8 hours    ===================== RENAL =========================  NORMAN on CKD stage 3, Anasarca. Diuresis with Lasix gtt. +zaroxolyn  Continue monitoring urine output, BUN/Creatinine, I/Os and replete electrolytes prn.     furosemide   Injectable 40 milliGRAM(s) IV Push every 12 hours    ==================== GASTROINTESTINAL===================  Nutrition following, continue clear liquid diet   Ileus present in setting of Cdiff.- NGT clamped 9/4 for 4 hrs, no residual. NGT removed, stared on sips, c/w TPN for now.  CT A/P on 8/30 with Pancolitis and small volume abdominopelvic ascites, not significantly changed since 8/20/2020.  General surgery following-continue serial abdominal exams. No surgical intervention at this time.     TPN adjusted, lipids discontinued due to elevated Triglycerides  continue TPN, trophic Glucerna feeds, clear diet  semi formed bowel movements    GI prophylaxis, pantoprazole  Injectable 40 milliGRAM(s) IV Push daily  Parenteral Nutrition - Adult 1 Each (125 mL/Hr) TPN Continuous <Continuous>    =======================    ENDOCRINE  =====================  Glucose control with insulin drip for stress hyperglycemia     insulin regular Infusion 1 Unit(s)/Hr (1 mL/Hr) IV Continuous <Continuous>    ========================INFECTIOUS DISEASE================  Klebsiella bacteremia from 8/13 which has since cleared  Clostridium difficile PCR positive, continue with vancomycin PO and Rectal Vanco   Transplant prophylaxis with Posaconazole and Atovaquone on hold duo to ileus   Dr. Lujan following, no need for additional abx at this time. Monitor for now.     vancomycin    Solution 500 milliGRAM(s) Oral every 6 hours  vancomycin  Retention Enema 500 milliGRAM(s) Rectal every 6 hours      Patient requires continuous monitoring with bedside rhythm monitoring, pulse ox monitoring, and intermittent blood gas analysis. Care plan discussed with ICU care team. Patient remained critical and at risk for life threatening decompensation.     By signing my name below, I, Ronit Joe, attest that this documentation has been prepared under the direction and in the presence of SHELLY Garcia   Electronically signed: Ab Gaffney, 09-07-20 @ 09:57    I, Mak Villagran, personally performed the services described in this documentation. all medical record entries made by the ab were at my direction and in my presence. I have reviewed the chart and agree that the record reflects my personal performance and is accurate and complete  Electronically signed: SHELLY Garcia

## 2020-09-07 NOTE — PROGRESS NOTE ADULT - ASSESSMENT
A/P:  70y M with history of heart transplant in 2/2018 admitted with autoimmune hemolytic anemia and dark stools requiring transfusion. Found to have c diff and abdominal distension on 8/23.  Pt developed Ileus.  Now tolerating clear liquid diet and trickle TF @ 10cc/hr    -  TPN:   - advance diet as tolerated  - hypertriglyceridemia -  today.    - hyperglycemia - currently on insulin gtt  - hypernatremia - continue 3000cc total volume.  NaAce removed from formula.  If diuresis needed, recommend metolazone 10mg QD instead of lasix.  - Strict Intake and Output- fluid overload improving  - HyperMg- improving w/ MgSO4 8mEq  - HypoPhos improving w/ 30mMol NaPhos  - Weights  per protocol  - Monitor CMP, Mg, iCa, & Phos daily  - Central line w/ designated port for TPN, maintenance per protocol  - Care per CTU  - discussed with SHELLY Corado    TPN pager [phone], spectra [phone]  M-F 8A-4P, Weekends and holidays 8A-12P  discussed with Dr. Tariq and Dr. KELLY Hansen A/P:  70y M with history of heart transplant in 2/2018 admitted with autoimmune hemolytic anemia and dark stools requiring transfusion. Found to have c diff and abdominal distension on 8/23.  Pt developed Ileus.  Now tolerating clear liquid diet and trickle TF @ 10cc/hr    - continue TPN: 135g AA, 140 dextrose, 0 lipids in 3000 cc total volume  - advance diet as tolerated  - hypertriglyceridemia -  today.  No SMOF lipids to be given.  - hyperglycemia - currently on insulin gtt.  Will continue 30U insulin in TPN.  - hypernatremia - continue 3000cc total volume.  NaAce removed from formula.  If diuresis needed, recommend metolazone 10mg QD instead of lasix.  - Strict Intake and Output  - Hypomagnesemia - MgSO4 increased to 10 meq today  - HypoPhos improving w/ 30mMol NaPhos  - Weights  per protocol  - Monitor CMP, Mg, iCa, & Phos daily  - Central line w/ designated port for TPN, maintenance per protocol  - Care per CTU  - discussed with SHELLY HEWITT pager [phone], spectra [phone]  M-F 8A-4P, Weekends and holidays 8A-12P  discussed with Dr. Tariq and Dr. KELLY Hansen

## 2020-09-07 NOTE — PROGRESS NOTE ADULT - ASSESSMENT
Mr. 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 He was treated with Rituximab and high dose steroids. He is currently admitted with symptomatic anemia with Hgb initially of 6.6 requiring blood transfusions. Evaluation was not consistent with hemolysis. EGD/enteroscopy eventually revealed chronic gastritis and small bowel ectasias.  His course was complicated by development of severe leukopenia and acute respiratory distress and profound sinus tachycardia on 8/13 in setting of Klebsiella bacteremia of unclear origin (preceded EGD). A CT scan of the chest showed a possible infiltrate. While his bacteremia was treated, he subsequently developed C diff colitis with severe abdominal distention. He also developed worsening acute on chronic renal failure, likely due to volume depletion, which has resolved. He is currently volume overloaded and diuretics were started on 9/1 with good response. He has ongoing improvement in abdominal distention, but has persistent leukocytosis of unclear etiology.      Plan discussed in multidisciplinary rounds with CTU team and CT surgery.

## 2020-09-07 NOTE — PROGRESS NOTE ADULT - PROBLEM SELECTOR PLAN 5
- TPN started on 9/2. NG tube in place. We will assess need for TPN and ability to feed enterically daily.

## 2020-09-08 LAB
ALBUMIN SERPL ELPH-MCNC: 2.1 G/DL — LOW (ref 3.3–5)
ALP SERPL-CCNC: 208 U/L — HIGH (ref 40–120)
ALT FLD-CCNC: 50 U/L — HIGH (ref 10–45)
ANION GAP SERPL CALC-SCNC: 14 MMOL/L — SIGNIFICANT CHANGE UP (ref 5–17)
AST SERPL-CCNC: 78 U/L — HIGH (ref 10–40)
BILIRUB DIRECT SERPL-MCNC: 0.4 MG/DL — HIGH (ref 0–0.2)
BILIRUB INDIRECT FLD-MCNC: 0.4 MG/DL — SIGNIFICANT CHANGE UP (ref 0.2–1)
BILIRUB SERPL-MCNC: 0.8 MG/DL — SIGNIFICANT CHANGE UP (ref 0.2–1.2)
BUN SERPL-MCNC: 96 MG/DL — HIGH (ref 7–23)
CALCIUM SERPL-MCNC: 8.1 MG/DL — LOW (ref 8.4–10.5)
CHLORIDE SERPL-SCNC: 107 MMOL/L — SIGNIFICANT CHANGE UP (ref 96–108)
CO2 SERPL-SCNC: 20 MMOL/L — LOW (ref 22–31)
CREAT SERPL-MCNC: 1.28 MG/DL — SIGNIFICANT CHANGE UP (ref 0.5–1.3)
CYCLOSPORINE SER-MCNC: 79 NG/ML — LOW (ref 150–400)
D DIMER BLD IA.RAPID-MCNC: 2801 NG/ML DDU — HIGH
GAS PNL BLDA: SIGNIFICANT CHANGE UP
GLUCOSE BLDC GLUCOMTR-MCNC: 115 MG/DL — HIGH (ref 70–99)
GLUCOSE BLDC GLUCOMTR-MCNC: 115 MG/DL — HIGH (ref 70–99)
GLUCOSE BLDC GLUCOMTR-MCNC: 118 MG/DL — HIGH (ref 70–99)
GLUCOSE BLDC GLUCOMTR-MCNC: 120 MG/DL — HIGH (ref 70–99)
GLUCOSE BLDC GLUCOMTR-MCNC: 120 MG/DL — HIGH (ref 70–99)
GLUCOSE BLDC GLUCOMTR-MCNC: 121 MG/DL — HIGH (ref 70–99)
GLUCOSE BLDC GLUCOMTR-MCNC: 130 MG/DL — HIGH (ref 70–99)
GLUCOSE BLDC GLUCOMTR-MCNC: 130 MG/DL — HIGH (ref 70–99)
GLUCOSE BLDC GLUCOMTR-MCNC: 134 MG/DL — HIGH (ref 70–99)
GLUCOSE BLDC GLUCOMTR-MCNC: 135 MG/DL — HIGH (ref 70–99)
GLUCOSE BLDC GLUCOMTR-MCNC: 136 MG/DL — HIGH (ref 70–99)
GLUCOSE BLDC GLUCOMTR-MCNC: 139 MG/DL — HIGH (ref 70–99)
GLUCOSE BLDC GLUCOMTR-MCNC: 147 MG/DL — HIGH (ref 70–99)
GLUCOSE BLDC GLUCOMTR-MCNC: 148 MG/DL — HIGH (ref 70–99)
GLUCOSE BLDC GLUCOMTR-MCNC: 151 MG/DL — HIGH (ref 70–99)
GLUCOSE BLDC GLUCOMTR-MCNC: 156 MG/DL — HIGH (ref 70–99)
GLUCOSE BLDC GLUCOMTR-MCNC: 156 MG/DL — HIGH (ref 70–99)
GLUCOSE BLDC GLUCOMTR-MCNC: 166 MG/DL — HIGH (ref 70–99)
GLUCOSE BLDC GLUCOMTR-MCNC: 166 MG/DL — HIGH (ref 70–99)
GLUCOSE SERPL-MCNC: 122 MG/DL — HIGH (ref 70–99)
GRAM STN FLD: SIGNIFICANT CHANGE UP
HAPTOGLOB SERPL-MCNC: <20 MG/DL — LOW (ref 34–200)
HCT VFR BLD CALC: 24.7 % — LOW (ref 39–50)
HGB BLD-MCNC: 7.6 G/DL — LOW (ref 13–17)
LDH SERPL L TO P-CCNC: 769 U/L — HIGH (ref 50–242)
MAGNESIUM SERPL-MCNC: 1.8 MG/DL — SIGNIFICANT CHANGE UP (ref 1.6–2.6)
MCHC RBC-ENTMCNC: 30.8 GM/DL — LOW (ref 32–36)
MCHC RBC-ENTMCNC: 31.4 PG — SIGNIFICANT CHANGE UP (ref 27–34)
MCV RBC AUTO: 102.1 FL — HIGH (ref 80–100)
NRBC # BLD: 8 /100 WBCS — HIGH (ref 0–0)
PHOSPHATE SERPL-MCNC: 4.1 MG/DL — SIGNIFICANT CHANGE UP (ref 2.5–4.5)
PLATELET # BLD AUTO: 131 K/UL — LOW (ref 150–400)
POTASSIUM SERPL-MCNC: 4.3 MMOL/L — SIGNIFICANT CHANGE UP (ref 3.5–5.3)
POTASSIUM SERPL-SCNC: 4.3 MMOL/L — SIGNIFICANT CHANGE UP (ref 3.5–5.3)
PREALB SERPL-MCNC: 22 MG/DL — SIGNIFICANT CHANGE UP (ref 20–40)
PROT SERPL-MCNC: 6.4 G/DL — SIGNIFICANT CHANGE UP (ref 6–8.3)
RBC # BLD: 2.42 M/UL — LOW (ref 4.2–5.8)
RBC # FLD: 24.5 % — HIGH (ref 10.3–14.5)
SARS-COV-2 RNA SPEC QL NAA+PROBE: SIGNIFICANT CHANGE UP
SODIUM SERPL-SCNC: 141 MMOL/L — SIGNIFICANT CHANGE UP (ref 135–145)
SPECIMEN SOURCE: SIGNIFICANT CHANGE UP
TRIGL SERPL-MCNC: 285 MG/DL — HIGH (ref 10–149)
TRIGL SERPL-MCNC: 326 MG/DL — HIGH (ref 10–149)
WBC # BLD: 19.12 K/UL — HIGH (ref 3.8–10.5)
WBC # FLD AUTO: 19.12 K/UL — HIGH (ref 3.8–10.5)

## 2020-09-08 PROCEDURE — 99291 CRITICAL CARE FIRST HOUR: CPT | Mod: 25

## 2020-09-08 PROCEDURE — 99232 SBSQ HOSP IP/OBS MODERATE 35: CPT

## 2020-09-08 PROCEDURE — 99233 SBSQ HOSP IP/OBS HIGH 50: CPT

## 2020-09-08 PROCEDURE — 71045 X-RAY EXAM CHEST 1 VIEW: CPT | Mod: 26

## 2020-09-08 PROCEDURE — 36620 INSERTION CATHETER ARTERY: CPT

## 2020-09-08 PROCEDURE — 99291 CRITICAL CARE FIRST HOUR: CPT

## 2020-09-08 PROCEDURE — 31622 DX BRONCHOSCOPE/WASH: CPT

## 2020-09-08 RX ORDER — I.V. FAT EMULSION 20 G/100ML
8.3 EMULSION INTRAVENOUS
Qty: 20 | Refills: 0 | Status: DISCONTINUED | OUTPATIENT
Start: 2020-09-08 | End: 2020-09-09

## 2020-09-08 RX ORDER — POTASSIUM CHLORIDE 20 MEQ
20 PACKET (EA) ORAL ONCE
Refills: 0 | Status: COMPLETED | OUTPATIENT
Start: 2020-09-08 | End: 2020-09-08

## 2020-09-08 RX ORDER — CYCLOSPORINE 100 MG/1
18 CAPSULE ORAL EVERY 24 HOURS
Refills: 0 | Status: DISCONTINUED | OUTPATIENT
Start: 2020-09-08 | End: 2020-09-09

## 2020-09-08 RX ORDER — ALBUMIN HUMAN 25 %
50 VIAL (ML) INTRAVENOUS
Refills: 0 | Status: DISCONTINUED | OUTPATIENT
Start: 2020-09-08 | End: 2020-09-13

## 2020-09-08 RX ORDER — ELECTROLYTE SOLUTION,INJ
1 VIAL (ML) INTRAVENOUS
Refills: 0 | Status: DISCONTINUED | OUTPATIENT
Start: 2020-09-08 | End: 2020-09-08

## 2020-09-08 RX ADMIN — Medication 500 MILLIGRAM(S): at 13:48

## 2020-09-08 RX ADMIN — Medication 500 MILLIGRAM(S): at 21:01

## 2020-09-08 RX ADMIN — Medication 500 MILLIGRAM(S): at 18:50

## 2020-09-08 RX ADMIN — Medication 50 MILLIGRAM(S): at 13:47

## 2020-09-08 RX ADMIN — Medication 500 MILLIGRAM(S): at 02:11

## 2020-09-08 RX ADMIN — Medication 50 MILLIGRAM(S): at 05:20

## 2020-09-08 RX ADMIN — Medication 8 MILLIGRAM(S): at 09:02

## 2020-09-08 RX ADMIN — Medication 1 EACH: at 17:10

## 2020-09-08 RX ADMIN — Medication 20 MILLIEQUIVALENT(S): at 06:14

## 2020-09-08 RX ADMIN — Medication 500 MILLIGRAM(S): at 11:51

## 2020-09-08 RX ADMIN — HEPARIN SODIUM 5000 UNIT(S): 5000 INJECTION INTRAVENOUS; SUBCUTANEOUS at 13:47

## 2020-09-08 RX ADMIN — Medication 50 MILLILITER(S): at 19:53

## 2020-09-08 RX ADMIN — Medication 50 MILLIGRAM(S): at 21:01

## 2020-09-08 RX ADMIN — Medication 500 MILLIGRAM(S): at 05:20

## 2020-09-08 RX ADMIN — HEPARIN SODIUM 5000 UNIT(S): 5000 INJECTION INTRAVENOUS; SUBCUTANEOUS at 21:01

## 2020-09-08 RX ADMIN — CYCLOSPORINE 2.1 MILLIGRAM(S): 100 CAPSULE ORAL at 12:48

## 2020-09-08 RX ADMIN — Medication 500 MILLIGRAM(S): at 09:02

## 2020-09-08 RX ADMIN — I.V. FAT EMULSION 8.3 ML/HR: 20 EMULSION INTRAVENOUS at 17:10

## 2020-09-08 RX ADMIN — PANTOPRAZOLE SODIUM 40 MILLIGRAM(S): 20 TABLET, DELAYED RELEASE ORAL at 11:52

## 2020-09-08 RX ADMIN — HEPARIN SODIUM 5000 UNIT(S): 5000 INJECTION INTRAVENOUS; SUBCUTANEOUS at 05:20

## 2020-09-08 NOTE — PROCEDURE NOTE - NSPROCDETAILS_GEN_ALL_CORE
lumen(s) aspirated and flushed/guidewire recovered/sterile dressing applied/sterile technique, catheter placed/ultrasound guidance
sterile dressing applied/sterile technique, catheter placed/ultrasound guidance/guidewire recovered/lumen(s) aspirated and flushed
location identified, draped/prepped, sterile technique used, needle inserted/introduced/connected to a pressurized flush line/hemostasis with direct pressure, dressing applied/ultrasound guidance/positive blood return obtained via catheter/sutured in place/Seldinger technique/all materials/supplies accounted for at end of procedure
sterile technique, catheter placed/lumen(s) aspirated and flushed/sterile dressing applied/guidewire recovered
hemostasis with direct pressure, dressing applied/positive blood return obtained via catheter/sutured in place/connected to a pressurized flush line/Seldinger technique/location identified, draped/prepped, sterile technique used, needle inserted/introduced
location identified, draped/prepped, sterile technique used, needle inserted/introduced/connected to a pressurized flush line/all materials/supplies accounted for at end of procedure/sutured in place/hemostasis with direct pressure, dressing applied/positive blood return obtained via catheter/Seldinger technique

## 2020-09-08 NOTE — PROCEDURE NOTE - NSPOSTCAREGUIDE_GEN_A_CORE
Care for catheter as per unit/ICU protocols
Care for catheter as per unit/ICU protocols/Verbal/written post procedure instructions were given to patient/caregiver

## 2020-09-08 NOTE — PROGRESS NOTE ADULT - SUBJECTIVE AND OBJECTIVE BOX
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 Patient was treated w/ prednisone and Rituximab. Tacro was discontinued and patient was also transitioned to everolimus  Admitted  6/16-6/19  for hyperglycemia.  Reported to transplant team having one done black tarry stool-  instructed to  present to [facility] for hemolytic anemia. Patient has been following with Hematology outpatient.  Denies CP  N/V diarrhea SOB. (11 Aug 2020 20:08)      Surgery/Hospital Course:  8/11 Admitted to [facility] with symptomatic anemia  8/13 EGD for investigation of tarry stools  8/15 SB capsule: stomach & SB lesions, likely ulcers.  8/17 EGD/push enteroscopy w/ angioectasias/erosions in small bowel. Gastropathy and esophagitis. Ulcer seen on VCE most likely scope trauma.    8/18 VSS transferred back to floor.   8/20 VS 9.0/28.4 stable Gastric biopsy results LA Grade A esophagitis.  - Acute gastritis. Biopsies taken to r/o CMV, No ulceration seen despite finding on capsule endoscopy - likely 2/2 scope  trauma that has since resolved. Pathology pending.  8/21 VSS H/H  8.1/25.7  trending down will monitor prednisone taper 30 mg today. Procardia 120 initiated today RHC biopsy Monday.   8/22 VVS; Patient reports loose stools x 2-3 days with 1 episode today.  Stool sent for C-dif and GI PCR. Abdominal X-ray revealed: dilated loops of bowel. Abdomen distended.  Patient denies N/V. GI called to re-evaluate. Continue with current medication regimen.  Awaiting for upcoming cardiac biopsy on Monday 8/24.  8/23   vss    creat up to 2.28 ,  repeating CMP,  TTE ordered  Bladder scan   8/24   cardiac bx cancelled   elev creat  to 2.74   cardio renal cslt called,    + CDIF   inc vanco to 500 q6   ID following  8/25 VSS: RHC today as per transplant team; transfuse 2 units PRBC today as per Dr. Viramontes; continue vanco for cdiff; transfer patient to CTU after cath today   8/26. Diuretic challenge with good response   8/27: Downgraded to the floor then upgraded to the CTU again for concern of abd distention   8/28 CT ABD & Pelvis reveals pancolitis  8/30: repeat CT scan of abd, gen surgery called to re-evaluated pt.  8/31: RUE duplex negative for DVT  9/4 NGT clamped, minimal residual. NGT removed. started sips   9/6 tolerating clear liquid diet    Today:    REVIEW OF SYSTEMS:  Gen: No fever  EYES/ENT: No visual changes;  No vertigo or throat pain   NECK: No pain   RES:  No shortness of breath or Cough  CARD: No chest pain   GI: No abdominal pain  : No dysuria  NEURO: No weakness  SKIN: No itching, rashes     ICU Vital Signs Last 24 Hrs  T(C): 36.4 (08 Sep 2020 08:00), Max: 36.6 (08 Sep 2020 04:00)  T(F): 97.5 (08 Sep 2020 08:00), Max: 97.9 (08 Sep 2020 04:00)  HR: 125 (08 Sep 2020 08:56) (116 - 130)  BP: 109/70 (08 Sep 2020 07:30) (109/70 - 135/87)  BP(mean): 85 (08 Sep 2020 07:30) (85 - 104)  ABP: 127/66 (08 Sep 2020 08:00) (121/66 - 141/76)  ABP(mean): 85 (08 Sep 2020 08:00) (84 - 98)  RR: 26 (08 Sep 2020 08:00) (13 - 50)  SpO2: 100% (08 Sep 2020 08:56) (97% - 100%)      Physical Exam:  Gen:  Awake, alert, no acute distress  CNS: non focal 	  Neck: no JVD  RES :  dec BS b/l, crackles at bases, no wheezing              CVS: RRR Normal S1/S2, +2 edema b/l LE  Abd: distended but soft, non-tender, no rebound tenderness. Bowel sounds present  Skin: No rash.    ============================I/O===========================   I&O's Detail    07 Sep 2020 07:01  -  08 Sep 2020 07:00  --------------------------------------------------------  IN:    dexmedetomidine Infusion: 14.4 mL    furosemide Infusion: 42.5 mL    Glucerna 1.5: 240 mL    insulin regular Infusion: 61 mL    Oral Fluid: 540 mL    Solution: 50.4 mL    TPN (Total Parenteral Nutrition): 3000 mL  Total IN: 3948.3 mL    OUT:    Indwelling Catheter - Urethral: 5095 mL  Total OUT: 5095 mL    Total NET: -1146.7 mL      08 Sep 2020 07:01  -  08 Sep 2020 09:18  --------------------------------------------------------  IN:    furosemide Infusion: 2.5 mL    Glucerna 1.5: 10 mL    insulin regular Infusion: 2.5 mL    Oral Fluid: 120 mL    Solution: 2.1 mL    TPN (Total Parenteral Nutrition): 12.5 mL  Total IN: 149.6 mL    OUT:    Indwelling Catheter - Urethral: 250 mL  Total OUT: 250 mL    Total NET: -100.4 mL        ============================ LABS =========================                        7.6    19.12 )-----------( 131      ( 08 Sep 2020 01:50 )             24.7     09-08    141  |  107  |  96<H>  ----------------------------<  122<H>  4.3   |  20<L>  |  1.28    Ca    8.1<L>      08 Sep 2020 01:50  Phos  4.1     09-08  Mg     1.8     09-08    TPro  6.4  /  Alb  2.1<L>  /  TBili  0.8  /  DBili  0.4<H>  /  AST  78<H>  /  ALT  50<H>  /  AlkPhos  208<H>  09-08    LIVER FUNCTIONS - ( 08 Sep 2020 01:52 )  Alb: 2.1 g/dL / Pro: 6.4 g/dL / ALK PHOS: 208 U/L / ALT: 50 U/L / AST: 78 U/L / GGT: x             ABG - ( 08 Sep 2020 05:37 )  pH, Arterial: 7.37  pH, Blood: x     /  pCO2: 44    /  pO2: 145   / HCO3: 25    / Base Excess: .4    /  SaO2: 99                  ======================Micro/Rad/Cardio=================  Culture: Reviewed   CXR: Reviewed  Echo:Reviewed  ======================================================  PAST MEDICAL & SURGICAL HISTORY:  H/O hemolytic anemia  H/O autoimmune hemolytic anemia  Knee pain, right  HLD (hyperlipidemia)  Former smoker  DVT of upper extremity (deep vein thrombosis)  Hepatitis C virus  GIB (gastrointestinal bleeding)  Ventricular fibrillation: s/p AICD  PAF (paroxysmal atrial fibrillation): on xarelto  Non-Ischemic Cardiomyopathy  SVT (Supraventricular Tachycardia)  HTN  CHF (Congestive Heart Failure)  S/P right heart catheterization: biopsy multiple  H/O heart transplant: 2/2018  Status post left hip replacement  History of Prior Ablation Treatment: for afib  AICD (Automatic Cardioverter/Defibrillator) Present: [employer] with 1 [employer] lead4/1/09- explanted and replaced with Medtronic 2 leads on 9/2/09    ====================ASSESSMENT ==============  Heart transplant 2/2018 for ACC/AHA stage D NICM   Resolved GI Bleed, Melena s/p EGD  Acute respiratory failure, resolved  Supraventricular tachycardia  Severe hypertension  Hyperlactatemia acidosis, resolved  Leukopenia- resolved  Acute blood loss anemia, stable   CKD Stage III  C. diff diarrhea  Klebsiella oxytoca bacteremia  Sepsis  Azotemia 2/2 CKD and Steroids   Pancolitis      Plan:  ====================== NEUROLOGY=====================  Off sedation, continue to monitor neuro status     ==================== RESPIRATORY======================  On supplemental O2 therapy via 2L NC, wean off as tolerated   Encourage incentive spirometry, continue pulse ox monitoring, follow ABGs.   Encourage chest PT, PT, OOB to chair    ====================CARDIOVASCULAR==================  Transplant team following.   Heart transplant 2/2018 for ACC/AHA stage D NICM, now on solumedrol from prednisone   (NPO for abd distention) off everolimus. Off cellcept while on high dose steroids.   Continue hemodynamic monitoring.   ASA on hold for hx of GI bleed / ulceration.   hydralazine for MAP 70-80    hydrALAZINE 50 milliGRAM(s) Oral every 8 hours  methylPREDNISolone sodium succinate Injectable 8 milliGRAM(s) IV Push <User Schedule>    ===================HEMATOLOGIC/ONC ===================  Anemia s/p multiple blood transfusions. Continue closely monitoring H&H and transfuse prn.   GI Bleeding ruled out s/p EGD, found to have ulcers. F/u heme recs for hx of autoimmune hemolytic anemia.      VTE prophylaxis, heparin   Injectable 5000 Unit(s) SubCutaneous every 8 hours    ===================== RENAL =========================  NORMAN on CKD stage 3, Anasarca. Diuresis with Lasix gtt   Continue monitoring urine output, BUN/Creatinine, I/Os and replete electrolytes prn.     furosemide Infusion 5 mG/Hr (2.5 mL/Hr) IV Continuous <Continuous>    ==================== GASTROINTESTINAL===================  Nutrition following, continue clear liquid diet   Ileus present in setting of Cdiff.- NGT clamped 9/4 for 4 hrs, no residual. NGT removed, stared on sips, c/w TPN for now.  CT A/P on 8/30 with Pancolitis and small volume abdominopelvic ascites, not significantly changed since 8/20/2020.  General surgery following-continue serial abdominal exams. No surgical intervention at this time.     GI prophylaxis, pantoprazole  Injectable 40 milliGRAM(s) IV Push daily  Parenteral Nutrition - Adult 1 Each (125 mL/Hr) TPN Continuous <Continuous>    =======================    ENDOCRINE  =====================  Glucose control with insulin drip for stress hyperglycemia     insulin regular Infusion 1 Unit(s)/Hr (1 mL/Hr) IV Continuous <Continuous>    ========================INFECTIOUS DISEASE================  Klebsiella bacteremia from 8/13 which has since cleared  Clostridium difficile PCR positive, continue with vancomycin PO and Rectal Vanco   Transplant prophylaxis with Posaconazole and Atovaquone on hold due to ileus   Dr. Lujan following, no need for additional abx at this time. Monitor for now.     vancomycin    Solution 500 milliGRAM(s) Oral every 6 hours  vancomycin  Retention Enema 500 milliGRAM(s) Rectal every 6 hours      Patient requires continuous monitoring with bedside rhythm monitoring, pulse ox monitoring, and intermittent blood gas analysis. Care plan discussed with ICU care team. Patient remained critical and at risk for life threatening decompensation.     By signing my name below, Ronit GUDINO, attest that this documentation has been prepared under the direction and in the presence of SHELLY Diez   Electronically signed: Katty Gaffney, 09-08-20 @ 09:18    I, Silvano August, personally performed the services described in this documentation. all medical record entries made by the scribe were at my direction and in my presence. 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 Patient was treated w/ prednisone and Rituximab. Tacro was discontinued and patient was also transitioned to everolimus  Admitted  6/16-6/19  for hyperglycemia.  Reported to transplant team having one done black tarry stool-  instructed to  present to [facility] for hemolytic anemia. Patient has been following with Hematology outpatient.  Denies CP  N/V diarrhea SOB. (11 Aug 2020 20:08)    Surgery/Hospital Course:  8/11 Admitted to [facility] with symptomatic anemia  8/13 EGD for investigation of tarry stools  8/15 SB capsule: stomach & SB lesions, likely ulcers.  8/17 EGD/push enteroscopy w/ angioectasias/erosions in small bowel. Gastropathy and esophagitis. Ulcer seen on VCE most likely scope trauma.    8/18 VSS transferred back to floor.   8/20 VS 9.0/28.4 stable Gastric biopsy results LA Grade A esophagitis.  - Acute gastritis. Biopsies taken to r/o CMV, No ulceration seen despite finding on capsule endoscopy - likely 2/2 scope  trauma that has since resolved. Pathology pending.  8/21 VSS H/H  8.1/25.7  trending down will monitor prednisone taper 30 mg today. Procardia 120 initiated today RHC biopsy Monday.   8/22 VVS; Patient reports loose stools x 2-3 days with 1 episode today.  Stool sent for C-dif and GI PCR. Abdominal X-ray revealed: dilated loops of bowel. Abdomen distended.  Patient denies N/V. GI called to re-evaluate. Continue with current medication regimen.  Awaiting for upcoming cardiac biopsy on Monday 8/24.  8/23   vss    creat up to 2.28 ,  repeating CMP,  TTE ordered  Bladder scan   8/24   cardiac bx cancelled   elev creat  to 2.74   cardio renal cslt called,    + CDIF   inc vanco to 500 q6   ID following  8/25 VSS: RHC today as per transplant team; transfuse 2 units PRBC today as per Dr. Viramontes; continue vanco for cdiff; transfer patient to CTU after cath today   8/26. Diuretic challenge with good response   8/27: Downgraded to the floor then upgraded to the CTU again for concern of abd distention   8/28 CT ABD & Pelvis reveals pancolitis  8/30: repeat CT scan of abd, gen surgery called to re-evaluated pt.  8/31: RUE duplex negative for DVT  9/4 NGT clamped, minimal residual. NGT removed. started sips   9/6 tolerating clear liquid diet    REVIEW OF SYSTEMS:  Gen: No fever  EYES/ENT: No visual changes;  No vertigo or throat pain   NECK: No pain   RES:  No shortness of breath or Cough  CARD: No chest pain   GI: No abdominal pain  : No dysuria  NEURO: No weakness  SKIN: No itching, rashes     ICU Vital Signs Last 24 Hrs  T(C): 36.4 (08 Sep 2020 08:00), Max: 36.6 (08 Sep 2020 04:00)  T(F): 97.5 (08 Sep 2020 08:00), Max: 97.9 (08 Sep 2020 04:00)  HR: 125 (08 Sep 2020 08:56) (116 - 130)  BP: 109/70 (08 Sep 2020 07:30) (109/70 - 135/87)  BP(mean): 85 (08 Sep 2020 07:30) (85 - 104)  ABP: 127/66 (08 Sep 2020 08:00) (121/66 - 141/76)  ABP(mean): 85 (08 Sep 2020 08:00) (84 - 98)  RR: 26 (08 Sep 2020 08:00) (13 - 50)  SpO2: 100% (08 Sep 2020 08:56) (97% - 100%)    Physical Exam:  Gen:  Awake, alert, no acute distress  CNS: non focal 	  Neck: no JVD  RES :  dec BS b/l, crackles at bases, no wheezing              CVS: RRR Normal S1/S2, +2 edema b/l LE  Abd: distended but soft, non-tender, no rebound tenderness. Bowel sounds present  Ext: +4 pitting edema in RUE, b/l LE's. +2 pitting edema in LUE.  Skin: No rash.    ============================I/O===========================   I&O's Detail    07 Sep 2020 07:01  -  08 Sep 2020 07:00  --------------------------------------------------------  IN:    dexmedetomidine Infusion: 14.4 mL    furosemide Infusion: 42.5 mL    Glucerna 1.5: 240 mL    insulin regular Infusion: 61 mL    Oral Fluid: 540 mL    Solution: 50.4 mL    TPN (Total Parenteral Nutrition): 3000 mL  Total IN: 3948.3 mL    OUT:    Indwelling Catheter - Urethral: 5095 mL  Total OUT: 5095 mL    Total NET: -1146.7 mL    08 Sep 2020 07:01  -  08 Sep 2020 09:18  --------------------------------------------------------  IN:    furosemide Infusion: 2.5 mL    Glucerna 1.5: 10 mL    insulin regular Infusion: 2.5 mL    Oral Fluid: 120 mL    Solution: 2.1 mL    TPN (Total Parenteral Nutrition): 12.5 mL  Total IN: 149.6 mL    OUT:    Indwelling Catheter - Urethral: 250 mL  Total OUT: 250 mL    Total NET: -100.4 mL    ============================ LABS =========================                        7.6    19.12 )-----------( 131      ( 08 Sep 2020 01:50 )             24.7     141  |  107  |  96<H>  ----------------------------<  122<H>  4.3   |  20<L>  |  1.28    Ca    8.1<L>      08 Sep 2020 01:50  Phos  4.1     09-08  Mg     1.8     09-08    TPro  6.4  /  Alb  2.1<L>  /  TBili  0.8  /  DBili  0.4<H>  /  AST  78<H>  /  ALT  50<H>  /  AlkPhos  208<H>  09-08    LIVER FUNCTIONS - ( 08 Sep 2020 01:52 )  Alb: 2.1 g/dL / Pro: 6.4 g/dL / ALK PHOS: 208 U/L / ALT: 50 U/L / AST: 78 U/L / GGT: x             ABG - ( 08 Sep 2020 05:37 )  pH, Arterial: 7.37  pH, Blood: x     /  pCO2: 44    /  pO2: 145   / HCO3: 25    / Base Excess: .4    /  SaO2: 99        ======================Micro/Rad/Cardio=================  Culture: Reviewed   CXR: Reviewed  Echo: Reviewed    ======================================================  PAST MEDICAL & SURGICAL HISTORY:  H/O hemolytic anemia  H/O autoimmune hemolytic anemia  Knee pain, right  HLD (hyperlipidemia)  Former smoker  DVT of upper extremity (deep vein thrombosis)  Hepatitis C virus  GIB (gastrointestinal bleeding)  Ventricular fibrillation: s/p AICD  PAF (paroxysmal atrial fibrillation): on xarelto  Non-Ischemic Cardiomyopathy  SVT (Supraventricular Tachycardia)  HTN  CHF (Congestive Heart Failure)  S/P right heart catheterization: biopsy multiple  H/O heart transplant: 2/2018  Status post left hip replacement  History of Prior Ablation Treatment: for afib  AICD (Automatic Cardioverter/Defibrillator) Present: [employer] with 1 [employer] lead4/1/09- explanted and replaced with Medtronic 2 leads on 9/2/09    ====================ASSESSMENT ==============  Heart transplant 2/2018 for ACC/AHA stage D NICM   Resolved GI Bleed, Melena s/p EGD  Acute respiratory failure, resolved  Supraventricular tachycardia  Severe hypertension  Hyperlactatemia acidosis, resolved  Leukopenia- resolved  Acute blood loss anemia, stable   CKD Stage III  C. diff diarrhea  Klebsiella oxytoca bacteremia  Sepsis  Azotemia 2/2 CKD and Steroids   Pancolitis    Plan:  ====================== NEUROLOGY=====================  Off sedation, continue to monitor neuro status     ==================== RESPIRATORY======================  Patient persistently has RLL collapse on CXR, plan to put patient on bipap this AM to try and re-expand lung, plan to repeat CXR around 1pm and based on CXR findings, we will determine whether or not patient is a candidate for awake bronchoscopy.  Encourage incentive spirometry, continue pulse ox monitoring, follow ABGs.   Encourage chest PT, PT, OOB to chair    ====================CARDIOVASCULAR==================  Transplant team following.   Heart transplant 2/2018 for ACC/AHA stage D NICM, now on solumedrol from prednisone. Cyclosporine infusion on for immunosuppression.  (NPO for abd distention) off everolimus. Off cellcept while on high dose steroids.   Continue hemodynamic monitoring.   ASA on hold for hx of GI bleed / ulceration.   hydralazine for MAP 70-80    hydrALAZINE 50 milliGRAM(s) Oral every 8 hours  methylPREDNISolone sodium succinate Injectable 8 milliGRAM(s) IV Push <User Schedule>    ===================HEMATOLOGIC/ONC ===================  Anemia s/p multiple blood transfusions. Continue closely monitoring H&H and transfuse prn.   GI Bleeding ruled out s/p EGD, found to have ulcers. F/u heme recs for hx of autoimmune hemolytic anemia.      VTE prophylaxis, heparin   Injectable 5000 Unit(s) SubCutaneous every 8 hours    ===================== RENAL =========================  NORMAN on CKD stage 3, Anasarca. Diuresis with Lasix gtt   Continue monitoring urine output, BUN/Creatinine, I/Os and replete electrolytes prn.     furosemide Infusion 5 mG/Hr (2.5 mL/Hr) IV Continuous <Continuous>    ==================== GASTROINTESTINAL===================  Nutrition following, continue clear liquid diet   Ileus present in setting of Cdiff.- NGT clamped 9/4 for 4 hrs, no residual. NGT removed, stared on sips, c/w TPN for now.  CT A/P on 8/30 with Pancolitis and small volume abdominopelvic ascites, not significantly changed since 8/20/2020.  General surgery following-continue serial abdominal exams. No surgical intervention at this time.     GI prophylaxis, pantoprazole  Injectable 40 milliGRAM(s) IV Push daily  Parenteral Nutrition - Adult 1 Each (125 mL/Hr) TPN Continuous <Continuous>    =======================    ENDOCRINE  =====================  Glucose control with insulin drip for stress hyperglycemia     insulin regular Infusion 1 Unit(s)/Hr (1 mL/Hr) IV Continuous <Continuous>    ========================INFECTIOUS DISEASE================  Klebsiella bacteremia from 8/13 which has since cleared  Clostridium difficile PCR positive, continue with vancomycin PO and Rectal Vanco   Transplant prophylaxis with Posaconazole and Atovaquone on hold due to ileus   Dr. Lujan following, no need for additional abx at this time. Monitor for now.     vancomycin    Solution 500 milliGRAM(s) Oral every 6 hours  vancomycin  Retention Enema 500 milliGRAM(s) Rectal every 6 hours    I have spent 40 minutes of critical care time with this patient.    Patient requires continuous monitoring with bedside rhythm monitoring, pulse ox monitoring, and intermittent blood gas analysis. Care plan discussed with ICU care team. Patient remained critical and at risk for life threatening decompensation.     By signing my name below, I, Ronit Joe, attest that this documentation has been prepared under the direction and in the presence of SHELLY Diez   Electronically signed: Katty Gaffney, 09-08-20 @ 09:18    I, Silvano August, personally performed the services described in this documentation. all medical record entries made by the ramuibe were at my direction and in my presence. 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 Patient was treated w/ prednisone and Rituximab. Tacro was discontinued and patient was also transitioned to everolimus  Admitted  6/16-6/19  for hyperglycemia.  Reported to transplant team having one done black tarry stool-  instructed to  present to [facility] for hemolytic anemia. Patient has been following with Hematology outpatient.  Denies CP  N/V diarrhea SOB. (11 Aug 2020 20:08)    Surgery/Hospital Course:  8/11 Admitted to [facility] with symptomatic anemia  8/13 EGD for investigation of tarry stools  8/15 SB capsule: stomach & SB lesions, likely ulcers.  8/17 EGD/push enteroscopy w/ angioectasias/erosions in small bowel. Gastropathy and esophagitis. Ulcer seen on VCE most likely scope trauma.    8/18 VSS transferred back to floor.   8/20 VS 9.0/28.4 stable Gastric biopsy results LA Grade A esophagitis.  - Acute gastritis. Biopsies taken to r/o CMV, No ulceration seen despite finding on capsule endoscopy - likely 2/2 scope  trauma that has since resolved. Pathology pending.  8/21 VSS H/H  8.1/25.7  trending down will monitor prednisone taper 30 mg today. Procardia 120 initiated today RHC biopsy Monday.   8/22 VVS; Patient reports loose stools x 2-3 days with 1 episode today.  Stool sent for C-dif and GI PCR. Abdominal X-ray revealed: dilated loops of bowel. Abdomen distended.  Patient denies N/V. GI called to re-evaluate. Continue with current medication regimen.  Awaiting for upcoming cardiac biopsy on Monday 8/24.  8/23   vss    creat up to 2.28 ,  repeating CMP,  TTE ordered  Bladder scan   8/24   cardiac bx cancelled   elev creat  to 2.74   cardio renal cslt called,    + CDIF   inc vanco to 500 q6   ID following  8/25 VSS: RHC today as per transplant team; transfuse 2 units PRBC today as per Dr. Viramontes; continue vanco for cdiff; transfer patient to CTU after cath today   8/26. Diuretic challenge with good response   8/27: Downgraded to the floor then upgraded to the CTU again for concern of abd distention   8/28 CT ABD & Pelvis reveals pancolitis  8/30: repeat CT scan of abd, gen surgery called to re-evaluated pt.  8/31: RUE duplex negative for DVT  9/4 NGT clamped, minimal residual. NGT removed. started sips   9/6 tolerating clear liquid diet    REVIEW OF SYSTEMS:  Gen: No fever  EYES/ENT: No visual changes;  No vertigo or throat pain   NECK: No pain   RES:  No shortness of breath or Cough  CARD: No chest pain   GI: No abdominal pain  : No dysuria  NEURO: No weakness  SKIN: No itching, rashes     ICU Vital Signs Last 24 Hrs  T(C): 36.4 (08 Sep 2020 08:00), Max: 36.6 (08 Sep 2020 04:00)  T(F): 97.5 (08 Sep 2020 08:00), Max: 97.9 (08 Sep 2020 04:00)  HR: 125 (08 Sep 2020 08:56) (116 - 130)  BP: 109/70 (08 Sep 2020 07:30) (109/70 - 135/87)  BP(mean): 85 (08 Sep 2020 07:30) (85 - 104)  ABP: 127/66 (08 Sep 2020 08:00) (121/66 - 141/76)  ABP(mean): 85 (08 Sep 2020 08:00) (84 - 98)  RR: 26 (08 Sep 2020 08:00) (13 - 50)  SpO2: 100% (08 Sep 2020 08:56) (97% - 100%)    Physical Exam:  Gen:  Awake, alert, no acute distress  CNS: non focal 	  Neck: no JVD  RES :  dec BS b/l, crackles at bases, no wheezing              CVS: RRR Normal S1/S2, +2 edema b/l LE  Abd: distended but soft, non-tender, no rebound tenderness. Bowel sounds present  Ext: +4 pitting edema in RUE, b/l LE's. +2 pitting edema in LUE.  Skin: No rash.    ============================I/O===========================   I&O's Detail    07 Sep 2020 07:01  -  08 Sep 2020 07:00  --------------------------------------------------------  IN:    dexmedetomidine Infusion: 14.4 mL    furosemide Infusion: 42.5 mL    Glucerna 1.5: 240 mL    insulin regular Infusion: 61 mL    Oral Fluid: 540 mL    Solution: 50.4 mL    TPN (Total Parenteral Nutrition): 3000 mL  Total IN: 3948.3 mL    OUT:    Indwelling Catheter - Urethral: 5095 mL  Total OUT: 5095 mL    Total NET: -1146.7 mL    08 Sep 2020 07:01  -  08 Sep 2020 09:18  --------------------------------------------------------  IN:    furosemide Infusion: 2.5 mL    Glucerna 1.5: 10 mL    insulin regular Infusion: 2.5 mL    Oral Fluid: 120 mL    Solution: 2.1 mL    TPN (Total Parenteral Nutrition): 12.5 mL  Total IN: 149.6 mL    OUT:    Indwelling Catheter - Urethral: 250 mL  Total OUT: 250 mL    Total NET: -100.4 mL    ============================ LABS =========================                        7.6    19.12 )-----------( 131      ( 08 Sep 2020 01:50 )             24.7     141  |  107  |  96<H>  ----------------------------<  122<H>  4.3   |  20<L>  |  1.28    Ca    8.1<L>      08 Sep 2020 01:50  Phos  4.1     09-08  Mg     1.8     09-08    TPro  6.4  /  Alb  2.1<L>  /  TBili  0.8  /  DBili  0.4<H>  /  AST  78<H>  /  ALT  50<H>  /  AlkPhos  208<H>  09-08    LIVER FUNCTIONS - ( 08 Sep 2020 01:52 )  Alb: 2.1 g/dL / Pro: 6.4 g/dL / ALK PHOS: 208 U/L / ALT: 50 U/L / AST: 78 U/L / GGT: x             ABG - ( 08 Sep 2020 05:37 )  pH, Arterial: 7.37  pH, Blood: x     /  pCO2: 44    /  pO2: 145   / HCO3: 25    / Base Excess: .4    /  SaO2: 99        ======================Micro/Rad/Cardio=================  Culture: Reviewed   CXR: Reviewed  Echo: Reviewed    ======================================================  PAST MEDICAL & SURGICAL HISTORY:  H/O hemolytic anemia  H/O autoimmune hemolytic anemia  Knee pain, right  HLD (hyperlipidemia)  Former smoker  DVT of upper extremity (deep vein thrombosis)  Hepatitis C virus  GIB (gastrointestinal bleeding)  Ventricular fibrillation: s/p AICD  PAF (paroxysmal atrial fibrillation): on xarelto  Non-Ischemic Cardiomyopathy  SVT (Supraventricular Tachycardia)  HTN  CHF (Congestive Heart Failure)  S/P right heart catheterization: biopsy multiple  H/O heart transplant: 2/2018  Status post left hip replacement  History of Prior Ablation Treatment: for afib  AICD (Automatic Cardioverter/Defibrillator) Present: [employer] with 1 [employer] lead4/1/09- explanted and replaced with [employer] 2 leads on 9/2/09    ====================ASSESSMENT ==============  Heart transplant 2/2018 for ACC/AHA stage D NICM   Resolved GI Bleed, Melena s/p EGD  Acute respiratory failure, resolved  Supraventricular tachycardia  Severe hypertension  Hyperlactatemia acidosis, resolved  Leukopenia- resolved  Acute blood loss anemia, stable   CKD Stage III  C. diff diarrhea  Klebsiella oxytoca bacteremia  Sepsis  Azotemia 2/2 CKD and Steroids   Pancolitis    Plan:  ====================== NEUROLOGY=====================  Off sedation, continue to monitor neuro status     ==================== RESPIRATORY======================  Patient persistently has RLL collapse on CXR, plan to put patient on bipap this AM to try and re-expand lung, plan to repeat CXR around 1pm and based on CXR findings, we will determine whether or not patient is a candidate for awake bronchoscopy.  Encourage incentive spirometry, continue pulse ox monitoring, follow ABGs.   Encourage chest PT, PT, OOB to chair    ====================CARDIOVASCULAR==================  Transplant team following.   Heart transplant 2/2018 for ACC/AHA stage D NICM, now on solumedrol from prednisone. Cyclosporine infusion on for immunosuppression.  (NPO for abd distention) off everolimus. Off cellcept while on high dose steroids.   Continue hemodynamic monitoring.   ASA on hold for hx of GI bleed / ulceration.   hydralazine for MAP 70-80    hydrALAZINE 50 milliGRAM(s) Oral every 8 hours  methylPREDNISolone sodium succinate Injectable 8 milliGRAM(s) IV Push <User Schedule>    ===================HEMATOLOGIC/ONC ===================  Anemia s/p multiple blood transfusions. Continue closely monitoring H&H and transfuse prn.   GI Bleeding ruled out s/p EGD, found to have ulcers. F/u heme recs for hx of autoimmune hemolytic anemia.      VTE prophylaxis, heparin   Injectable 5000 Unit(s) SubCutaneous every 8 hours    ===================== RENAL =========================  NORMAN on CKD stage 3, Anasarca. Diuresis with Lasix gtt   Continue monitoring urine output, BUN/Creatinine, I/Os and replete electrolytes prn.     furosemide Infusion 5 mG/Hr (2.5 mL/Hr) IV Continuous <Continuous>    ==================== GASTROINTESTINAL===================  Nutrition following, continue clear liquid diet   Ileus present in setting of Cdiff.- NGT clamped 9/4 for 4 hrs, no residual. NGT removed, stared on sips, c/w TPN for now.  CT A/P on 8/30 with Pancolitis and small volume abdominopelvic ascites, not significantly changed since 8/20/2020.  General surgery following-continue serial abdominal exams. No surgical intervention at this time.     GI prophylaxis, pantoprazole  Injectable 40 milliGRAM(s) IV Push daily  Parenteral Nutrition - Adult 1 Each (125 mL/Hr) TPN Continuous <Continuous>    =======================    ENDOCRINE  =====================  Glucose control with insulin drip for stress hyperglycemia     insulin regular Infusion 1 Unit(s)/Hr (1 mL/Hr) IV Continuous <Continuous>    ========================INFECTIOUS DISEASE================  Klebsiella bacteremia from 8/13 which has since cleared  Clostridium difficile PCR positive, continue with vancomycin PO and Rectal Vanco   Transplant prophylaxis with Posaconazole and Atovaquone on hold due to ileus   Dr. Lujan following, no need for additional abx at this time. Monitor for now.     vancomycin    Solution 500 milliGRAM(s) Oral every 6 hours  vancomycin  Retention Enema 500 milliGRAM(s) Rectal every 6 hours    I have spent 45 minutes of critical care time with this patient.    Patient requires continuous monitoring with bedside rhythm monitoring, pulse ox monitoring, and intermittent blood gas analysis. Care plan discussed with ICU care team. Patient remained critical and at risk for life threatening decompensation.     By signing my name below, I, Ronit Joe, attest that this documentation has been prepared under the direction and in the presence of SHELLY Diez   Electronically signed: Katty Gaffney, 09-08-20 @ 09:18    I, Silvano August, personally performed the services described in this documentation. all medical record entries made by the ramuibe were at my direction and in my presence. I have reviewed the chart and agree that the record reflects my personal performance and is accurate and complete  Electronically signed: SHELLY Diez

## 2020-09-08 NOTE — PROGRESS NOTE ADULT - PROBLEM SELECTOR PLAN 1
Will continue medications per ID guidance. Fecal transplant not currently an option. We will not broaden antibiotics in the absence of positive cultures or other clear signs/symptoms of infection - Will continue medications per ID guidance. Fecal transplant not currently an option.   - Continue oral and rectal Vancomycin

## 2020-09-08 NOTE — PROCEDURE NOTE - NSINFORMCONSENT_GEN_A_CORE
Benefits, risks, and possible complications of procedure explained to patient/caregiver who verbalized understanding and gave written consent.
Benefits, risks, and possible complications of procedure explained to patient/caregiver who verbalized understanding and gave verbal consent.
Benefits, risks, and possible complications of procedure explained to patient/caregiver who verbalized understanding and gave written consent.
This was an emergent procedure.

## 2020-09-08 NOTE — PROCEDURE NOTE - NSINDICATIONS_GEN_A_CORE
hemodynamic monitoring/critical illness
hemodynamic monitoring/venous access
arterial puncture to obtain ABG's/monitoring purposes/critical patient
arterial puncture to obtain ABG's/blood sampling
critical illness/venous access
critical patient/arterial puncture to obtain ABG's

## 2020-09-08 NOTE — PROGRESS NOTE ADULT - PROBLEM SELECTOR PLAN 5
- TPN started on 9/2. NG tube in place. We will assess need for TPN and ability to feed enterically daily. - TPN started on 9/2.   - Currently tolerated low dose TFs via NGT.

## 2020-09-08 NOTE — PROGRESS NOTE ADULT - SUBJECTIVE AND OBJECTIVE BOX
Indication: Elevated right hemidiaphragm possible collapse RLL    Pre-op Dx: Elevated right hemidiaphragm possible collapse RLL    History: S/P heart transplant     Preop medication: none     Xray Findings: Elevated right hemidiaphragm possible collapse RLL    Findings: Minimal secretions on RLL      Specimens: COVID19 PCR

## 2020-09-08 NOTE — PROGRESS NOTE ADULT - PROBLEM SELECTOR PLAN 2
- We will adjust IV cyclosporine as necessary. Currently at 15mg/24hours to reduce overall level of immunosuppression. Everolimus level was 5.3 on 8/31 and has been discontinued.     - Continue current methylprednisone dose (equivalent to 10 mg daily of prednisone). This was decreased on 9/5.  - Hypertension currently controlled with hydralazine 50 mg PO TID.  - Will need to resume statin and aspirin when stable. - We will adjust IV cyclosporine as necessary. Currently at 15mg/24hours with level 79. Increase to 17 or 18 mg/24h for goal level 100.  - Eventual plan to try switch back to tacrolimus as monotherapy  - Everolimus level was 5.3 on 8/31 and has been discontinued.  Please resend level to ensure it is now undetectable.  - Continue current methylprednisone dose (equivalent to 10 mg daily of prednisone). This was decreased on 9/5. Will need to review with Hematology additional weaning given hemolytic anemia.  - Hypertension currently controlled with hydralazine 50 mg PO TID.  - Will need to resume statin and aspirin when stable.

## 2020-09-08 NOTE — PROGRESS NOTE ADULT - ASSESSMENT
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 s/p EGD with esophagitis and gastritis. Developed Klebsiella oxytoca bacteremia requiring transfer to CTU. Clinically improving, transferred to [facility], finished 10-day of ceftriaxone, however, developed severe C.diff colitis on Vancomycin 500mg q6h PO and IV Flagyl. He had RHC on 8/25 and found to have high filling pressure so transferred to CTU again.    #severe C.diff colitis with NORMAN- C.diff PCR detected (8/23). Repeat CT on 8/30 without evidence of toxic megacolon.   - Leukocytosis persisting  Suspect is immune system recovering from immunosuppression and now reacting to Cdiff infection  - cont vancomycin 500 mg per rectum every 6 hours 8/30-->  - cont PO Vanco 500 q 6h  8/23-->  - discontinued IV flagyl (8/24-9/1)  - s/p  ERAVAYCLINE 1 mg/kg (750 mg)  every 12 hours for total 7 days (8/31- 9/6)  - completed IVIG x5 days (8/29-9/1)  - fecal transplant not available    #leukocytosis - 2/2 ongoing c diff vs other infection.  - Followup on 9/4 blood cultureL No Growth to date  - would start abx only for positive blood cultures as abx can worsen current cdiff infection     #Klebsiella oxytoca bacteremia: resolved   Meropenem (8/14-8/17)  Cefepime (8/17-20)  Ceftriaxone (8/20-)  - Growing in blood cultures 2/2 sets on 8/13  - Repeat blood cultures x2 sets 8/14 no growth final  - s/p ceftriaxone 8/14-24   - blood cultures 8/25 NG  - Followup on 9/4 blood culture Negative    #OI prophylaxis-  -Was previously receiving high dose steroids  -On IV cyclosporine now  -CMV viral load(8/17, 8/26): neg  -Valcyte and Bactrim d/c'ed on 8/17 due to leukopenia  -Galactomann<0.5, Fungitell<31  -Per hemoc, will taper prednisone every 7 days by 10mg  -Will consider bactrim for PCP ppx when able to tolerated PO    #Anemia- r/o GI bleeding  s/p EGD  EGD 8/17: esophagitis, acute gastritis s/p biopsy, no ulceration  On PPI 40mg daily  - cont to monitor cbc    discussed with CTU PA

## 2020-09-08 NOTE — PROGRESS NOTE ADULT - ASSESSMENT
Mr. 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 He was treated with Rituximab and high dose steroids. He is currently admitted with symptomatic anemia with Hgb initially of 6.6 requiring blood transfusions. Evaluation was not consistent with hemolysis. EGD/enteroscopy eventually revealed chronic gastritis and small bowel ectasias.  His course was complicated by development of severe leukopenia and acute respiratory distress and profound sinus tachycardia on 8/13 in setting of Klebsiella bacteremia of unclear origin (preceded EGD). A CT scan of the chest showed a possible infiltrate. While his bacteremia was treated, he subsequently developed C diff colitis with severe abdominal distention. He also developed worsening acute on chronic renal failure, likely due to volume depletion, which has resolved. He is currently volume overloaded and diuretics were started on 9/1 with good response. He has ongoing improvement in abdominal distention, but has persistent leukocytosis of unclear etiology. Mr. 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 He was treated with Rituximab and high dose steroids. He is currently admitted with symptomatic anemia with Hgb initially of 6.6 requiring blood transfusions. Evaluation was not consistent with hemolysis. EGD/enteroscopy eventually revealed chronic gastritis and small bowel ectasias.  His course was complicated by development of severe leukopenia and acute respiratory distress and profound sinus tachycardia on 8/13 in setting of Klebsiella bacteremia of unclear origin (preceded EGD). A CT scan of the chest showed a possible infiltrate. While his bacteremia was treated, he subsequently developed C diff colitis with severe abdominal distention. He also developed worsening acute on chronic renal failure, likely due to volume depletion, which has resolved. He is currently volume overloaded and diuretics were started on 9/1 with good response. He has ongoing improvement in abdominal distention, but has persistent leukocytosis. Now tolerating enteral feeds.

## 2020-09-08 NOTE — PROGRESS NOTE ADULT - SUBJECTIVE AND OBJECTIVE BOX
Follow Up:  cdiff    Interval History/ROS:  oob to chair, one loose bowel movement ovenight, denies pain    Allergies  No Known Allergies    ANTIMICROBIALS:  vancomycin    Solution 500 every 6 hours  vancomycin  Retention Enema 500 every 6 hours      OTHER MEDS:  MEDICATIONS  (STANDING):  cycloSPORINE  (SandIMMUNE) IVPB 15 every 24 hours  furosemide Infusion 5 <Continuous>  heparin   Injectable 5000 every 8 hours  hydrALAZINE 50 every 8 hours  insulin regular Infusion 1 <Continuous>  methylPREDNISolone sodium succinate Injectable 8 <User Schedule>  pantoprazole  Injectable 40 daily      Vital Signs Last 24 Hrs  T(C): 36.4 (08 Sep 2020 08:00), Max: 36.6 (08 Sep 2020 04:00)  T(F): 97.5 (08 Sep 2020 08:00), Max: 97.9 (08 Sep 2020 04:00)  HR: 123 (08 Sep 2020 11:00) (118 - 131)  BP: 109/70 (08 Sep 2020 07:30) (109/70 - 135/87)  BP(mean): 85 (08 Sep 2020 07:30) (85 - 104)  RR: 18 (08 Sep 2020 11:00) (13 - 28)  SpO2: 100% (08 Sep 2020 11:00) (99% - 100%)    PHYSICAL EXAM:  General: WN/WD NAD, Non-toxic  o BIPAP - NGT in place (feeds held)  Neurology: A&Ox3,  Respiratory: Clear to auscultation bilaterally  CV: RRR, S1S2, no murmurs, rubs or gallops  Abdominal: prominent distended, positive bowel sounds  Extremities: No edema, + peripheral pulses  Line Sites: Clear  Skin: No rash                        7.6    19.12 )-----------( 131      ( 08 Sep 2020 01:50 )             24.7   WBC Count: 19.12 (09-08 @ 01:50)  WBC Count: 19.12 (09-07 @ 00:40)  WBC Count: 17.60 (09-06 @ 06:34)  WBC Count: 15.32 (09-06 @ 05:35)  WBC Count: 20.53 (09-05 @ 00:30)  WBC Count: 21.16 (09-04 @ 00:35)    09-08    141  |  107  |  96<H>  ----------------------------<  122<H>  4.3   |  20<L>  |  1.28  Creatinine: 1.28 (09-08 @ 01:50)    Creatinine: 1.17 (09-07 @ 00:40)    Creatinine: 1.11 (09-06 @ 06:34)    Creatinine: 1.18 (09-06 @ 05:35)    Creatinine: 1.53 (09-05 @ 00:30)    Creatinine: 1.61 (09-04 @ 00:36)    Ca    8.1<L>      08 Sep 2020 01:50  Phos  4.1     09-08  Mg     1.8     09-08    TPro  6.4  /  Alb  2.1<L>  /  TBili  0.8  /  DBili  0.4<H>  /  AST  78<H>  /  ALT  50<H>  /  AlkPhos  208<H>  09-08      MICROBIOLOGY:  .Blood Blood-Peripheral  09-04-20   No growth to date.  --  --      .Blood Triple Lumen BROWN  08-25-20   No Growth Final  --  --      .Blood Blood  08-24-20   No Growth Final  --  --      .Stool Feces  08-23-20   GI PCR Results: NOT detected    .Urine Clean Catch (Midstream)  08-14-20   No growth  --  --      .Blood Blood  08-14-20   No Growth Final  --  --      .Blood Blood-Peripheral  08-13-20   Growth in aerobic and anaerobic bottles: Klebsiella oxytoca/Raoutella  ornithinolytica  See previous culture [id number]  --    Growth in anaerobic bottle: Gram Negative Rods  Growth in aerobic bottle: Gram Negative Rods      .Blood Blood-Peripheral  08-13-20   Growth in anaerobic bottle: Klebsiella oxytoca/Raoutella ornithinolytica    .Urine Clean Catch (Midstream)  08-13-20   >=3 organisms. Probable collection contamination.  --  --    RADIOLOGY:  < from: Xray Chest 1 View- PORTABLE-Routine (09.08.20 @ 03:47) >  FINDINGS/  IMPRESSION:  Enteric tube is in proximal duodenum -tip is off the film.  Left IJ central venous catheter terminates in the left brachiocephalic vein..  Left upper extremity PICC terminates in the SVC.  S/P sternotomy.  Difficult to assess heart size.  Left lung is clear.  Unchanged small right pleural effusion with associated right basilar atelectasis.  No pneumothorax.    < end of copied text >    < from: Xray Abdomen 1 View PORTABLE -Urgent (09.04.20 @ 06:41) >  FINDINGS:    Redemonstrated diffuse gaseous distention of the small and large bowel.  No evidence of focal obstruction.  No acute bony pathology.  Partially visualized is a left hip prosthesis.    IMPRESSION:    Grosslyunchanged distention of the bowels, consistent with ileus.    < end of copied text >      Ricky Pope MD; Division of Infectious Disease; Pager: [phone]; nights and weekends: [phone]

## 2020-09-08 NOTE — PROGRESS NOTE ADULT - SUBJECTIVE AND OBJECTIVE BOX
Subjective:    Medications:  benzocaine 15 mG/menthol 3.6 mG (Sugar-Free) Lozenge 1 Lozenge Oral every 6 hours PRN  chlorhexidine 2% Cloths 1 Application(s) Topical <User Schedule>  cycloSPORINE  (SandIMMUNE) IVPB 15 milliGRAM(s) IV Intermittent every 24 hours  fat emulsion (Fish Oil and Plant Based) 20% Infusion 8.3 mL/Hr IV Continuous <Continuous>  furosemide Infusion 5 mG/Hr IV Continuous <Continuous>  heparin   Injectable 5000 Unit(s) SubCutaneous every 8 hours  hydrALAZINE 50 milliGRAM(s) Oral every 8 hours  insulin regular Infusion 1 Unit(s)/Hr IV Continuous <Continuous>  methylPREDNISolone sodium succinate Injectable 8 milliGRAM(s) IV Push <User Schedule>  pantoprazole  Injectable 40 milliGRAM(s) IV Push daily  Parenteral Nutrition - Adult 1 Each TPN Continuous <Continuous>  Parenteral Nutrition - Adult 1 Each TPN Continuous <Continuous>  vancomycin    Solution 500 milliGRAM(s) Oral every 6 hours  vancomycin  Retention Enema 500 milliGRAM(s) Rectal every 6 hours    Vitals:  T(C): 36.4 (09-08-20 @ 08:00), Max: 36.6 (09-08-20 @ 04:00)  HR: 123 (09-08-20 @ 11:00) (118 - 131)  BP: 109/70 (09-08-20 @ 07:30) (109/70 - 135/87)  BP(mean): 85 (09-08-20 @ 07:30) (85 - 104)  ABP: 140/71 (09-08-20 @ 11:00) (121/66 - 141/76)  ABP(mean): 94 (09-08-20 @ 11:00) (84 - 98)  RR: 18 (09-08-20 @ 11:00) (13 - 28)  SpO2: 100% (09-08-20 @ 11:00) (99% - 100%)      I&O's Summary    07 Sep 2020 07:01  -  08 Sep 2020 07:00  --------------------------------------------------------  IN: 3948.3 mL / OUT: 5095 mL / NET: -1146.7 mL    08 Sep 2020 07:01  -  08 Sep 2020 11:18  --------------------------------------------------------  IN: 225.1 mL / OUT: 850 mL / NET: -624.9 mL        Physical Exam:  Appearance: No Acute Distress  HEENT: JVP ___ cm H2O, no HJR  Cardiovascular: RRR, Normal S1 S2, No murmurs/rubs/gallops  Respiratory: Clear to auscultation bilaterally  Gastrointestinal: Soft, Non-tender, non-distended	  Skin: no skin lesions  Neurologic: Non-focal  Extremities: No LE edema, warm and well perfused  Psychiatry: A & O x 3, Mood & affect appropriate        Labs:                        7.6    19.12 )-----------( 131      ( 08 Sep 2020 01:50 )             24.7     09-08    141  |  107  |  96<H>  ----------------------------<  122<H>  4.3   |  20<L>  |  1.28    Ca    8.1<L>      08 Sep 2020 01:50  Phos  4.1     09-08  Mg     1.8     09-08    TPro  6.4  /  Alb  2.1<L>  /  TBili  0.8  /  DBili  0.4<H>  /  AST  78<H>  /  ALT  50<H>  /  AlkPhos  208<H>  09-08 Subjective: no complaints, worried about the fluid overload; denies abdominal pain, tolerating TFs per RN    Medications:  benzocaine 15 mG/menthol 3.6 mG (Sugar-Free) Lozenge 1 Lozenge Oral every 6 hours PRN  chlorhexidine 2% Cloths 1 Application(s) Topical <User Schedule>  cycloSPORINE  (SandIMMUNE) IVPB 15 milliGRAM(s) IV Intermittent every 24 hours  fat emulsion (Fish Oil and Plant Based) 20% Infusion 8.3 mL/Hr IV Continuous <Continuous>  furosemide Infusion 5 mG/Hr IV Continuous <Continuous>  heparin   Injectable 5000 Unit(s) SubCutaneous every 8 hours  hydrALAZINE 50 milliGRAM(s) Oral every 8 hours  insulin regular Infusion 1 Unit(s)/Hr IV Continuous <Continuous>  methylPREDNISolone sodium succinate Injectable 8 milliGRAM(s) IV Push <User Schedule>  pantoprazole  Injectable 40 milliGRAM(s) IV Push daily  Parenteral Nutrition - Adult 1 Each TPN Continuous <Continuous>  Parenteral Nutrition - Adult 1 Each TPN Continuous <Continuous>  vancomycin    Solution 500 milliGRAM(s) Oral every 6 hours  vancomycin  Retention Enema 500 milliGRAM(s) Rectal every 6 hours    Vitals:  T(C): 36.4 (09-08-20 @ 08:00), Max: 36.6 (09-08-20 @ 04:00)  HR: 123 (09-08-20 @ 11:00) (118 - 131)  BP: 109/70 (09-08-20 @ 07:30) (109/70 - 135/87)  BP(mean): 85 (09-08-20 @ 07:30) (85 - 104)  ABP: 140/71 (09-08-20 @ 11:00) (121/66 - 141/76)  ABP(mean): 94 (09-08-20 @ 11:00) (84 - 98)  RR: 18 (09-08-20 @ 11:00) (13 - 28)  SpO2: 100% (09-08-20 @ 11:00) (99% - 100%)      I&O's Summary    07 Sep 2020 07:01  -  08 Sep 2020 07:00  --------------------------------------------------------  IN: 3948.3 mL / OUT: 5095 mL / NET: -1146.7 mL    08 Sep 2020 07:01  -  08 Sep 2020 11:18  --------------------------------------------------------  IN: 225.1 mL / OUT: 850 mL / NET: -624.9 mL        General: No distress. Comfortable.  HEENT: EOM intact.  Neck: Neck supple. JVP mildly elevated. No masses  Chest: Clear to auscultation bilaterally.  CV: Tachycardic, regular. Normal S1 and S2. III/VI combined systolic and diastolic murmur heard across precordium. No rubs or gallops.  Abdomen: Distended, but compressible. Non-tender. Prominent bowel sounds.   Extremities: RUE>>LUE edema, diffuse anasarca  Skin: No rashes  Neurology: Alert and oriented. Sensation intact  Psych: Affect normal    Labs:                        7.6    19.12 )-----------( 131      ( 08 Sep 2020 01:50 )             24.7     09-08    141  |  107  |  96<H>  ----------------------------<  122<H>  4.3   |  20<L>  |  1.28    Ca    8.1<L>      08 Sep 2020 01:50  Phos  4.1     09-08  Mg     1.8     09-08    TPro  6.4  /  Alb  2.1<L>  /  TBili  0.8  /  DBili  0.4<H>  /  AST  78<H>  /  ALT  50<H>  /  AlkPhos  208<H>  09-08

## 2020-09-08 NOTE — PROGRESS NOTE ADULT - ASSESSMENT
A/P:  70y M with history of heart transplant in 2/2018 admitted with autoimmune hemolytic anemia and dark stools requiring transfusion. Found to have c diff and abdominal distension on 8/23.  Pt developed Ileus w/ NGT placement and remains NPO.    TPN consulted to assist w/ management in pt who will be NPO for a prolonged period of time  Pt w/ Acute Severe Protein-Calorie Malnutrition    TPN 135g AA, 140g Dextrose, & 20g Lipids  HyperTg- restarting Lipids at 20g Lipids        continue to monitor & reeval need to adjust to compensate for the lower amt of calories        continue to monitor daily    - Hyperglycemia- continue w/ 30U insulin in TPN & w/ Insulin gtt managed by CTU                     TF on hold &  NORMAN  Improving             pt on steriods             d/w CTU for them to decide whether to continue Insulin gtt vs standing                 Insulin w/ FS q6H w/ ISS coverage to cover Enteral (TF) nutrition  - Strict Intake and Output- fluid overload              HyperNa - improving w/ 3L volume              NaPhos 30mMol             Pt on Lasix gtt             Norman resolving  - HyperMg- improving w/ MgSO4 10mEq  - HypoPhos improving w/ 30mMol NaPhos  - Elevated Alk Phos & Lfts-   continue to monitor  - Weights as per protocol  - Monitor  CMP, Mg, iCa, & Phos daily  - Check Prealbumin check weekly  -   - Central line w/ designated port for TPN, maintenance as per protocol  - Continue monitoring as per CTU w/ Surgery, will follow with you, d/w CTU team    LEWIS StaffordC  B) [phone]  d/w Ayan Hansen & Marciano A/P:  70y M with history of heart transplant in 2/2018 admitted with autoimmune hemolytic anemia and dark stools requiring transfusion. Found to have c diff and abdominal distension on 8/23.  Pt developed Ileus w/ NGT placement and remains NPO.    TPN consulted to assist w/ management in pt who will be NPO for a prolonged period of time  Pt w/ Acute Severe Protein-Calorie Malnutrition    TPN 135g AA, 140g Dextrose, & 20g Lipids  HyperTg- restarting Lipids at 20g Lipids        continue to monitor & reeval need to adjust to compensate for the lower amt of calories        continue to monitor daily    - Hyperglycemia- continue w/ 30U insulin in TPN & w/ Insulin gtt managed by CTU                     TF on hold &  NORMAN  Improving             pt on steriods             d/w CTU for them to decide whether to continue Insulin gtt vs standing                 Insulin w/ FS q6H w/ ISS coverage to cover Enteral (TF) nutrition  - Strict Intake and Output- fluid overload              HyperNa - improving w/ 3L volume              NaPhos 30mMol             Pt on Lasix gtt- consider metolazone 10mg QD instead of lasix             Norman resolving  - HypoMg- improving w/ MgSO4 10mEq  - HypoPhos improving w/ 30mMol NaPhos  - Elevated Alk Phos & Lfts-   continue to monitor  - Weights as per protocol  - Monitor  CMP, Mg, iCa, & Phos daily  - Check Prealbumin check weekly  - Central line w/ designated port for TPN, maintenance as per protocol  - Continue monitoring as per CTU w/ Surgery, will follow with you, d/w SHELLY Barragan-C  (B) [phone]  d/w Ayan Hansen & Marciano

## 2020-09-08 NOTE — PROCEDURE NOTE - NSSITEPREP_SKIN_A_CORE
Adherence to aseptic technique: hand hygiene prior to donning barriers (gown, gloves), don cap and mask, sterile drape over patient/chlorhexidine
chlorhexidine
Adherence to aseptic technique: hand hygiene prior to donning barriers (gown, gloves), don cap and mask, sterile drape over patient/chlorhexidine
chlorhexidine

## 2020-09-08 NOTE — PROGRESS NOTE ADULT - SUBJECTIVE AND OBJECTIVE BOX
Pre-Bronchoscopy Tuberculosis Risk Screening Tool  To reduce the risk for airborne transmission of Mycobacterium tuberculosis, this assessment form must be completed prior to bronchoscopy procedures being performed outside of a negative pressure environment.    Procedure Date:  09-08-20 @ 13:16  Health Care Provider Name: Jeremiah Deshpande MD  Form Completed By: Jeremiah Deshpande MD  Reason for the Bronchoscopy: Secretions/atelectasis  Date of Procedure:  09-08-20 @ 13:16  Planned Location for the Procedure: X Intensive Care Unit       Risk Assessment  I. I have personally evaluated this patient for Mycobacterium tuberculosis and I determined the following risk:       X  Low risk or TB     [ ] Significant risk for TB    II. Additional Findings: None    III. Based on the Determined Risk for TB the following Action(s) are Suggested:  1. If there are no risk factors for TB infection proceed with the procedure.  2. If there is low risk or significant risk for TB infection the following recommendations should be followed:            a. Perform the procedure in a negative pressure room, with N95 respirator.            b. If not feasible to move the patient or defer the procedure:                    i. Use a single-bedded room low traffic area to perform the bronchoscopy procedure.                   ii. Place a portable high-efficiency particulate air (HEPA) filter in the space prior to starting the procedure and keep closed.                       Refer to the INF.1132 titled “Tuberculosis Control Strategy Plan” for additional information.                  iii. All Health Care Providers within the procedure room shall wear an N95 respirator.            c. Documentation of the tuberculosis risk assessment should be included within the patient’s medical record.

## 2020-09-08 NOTE — PROGRESS NOTE ADULT - PROBLEM SELECTOR PLAN 4
- Continue to monitor with transition to CsA. Will continue prednisone wean as above. - Continue to monitor.   - Review steroid wean with hematology.

## 2020-09-08 NOTE — PROGRESS NOTE ADULT - SUBJECTIVE AND OBJECTIVE BOX
[facility] NUTRITION SUPPORT / TPN -- FOLLOW UP NOTE  --------------------------------------------------------------------------------    24 hour events/subjective:    tolerating TF w/o N/ V  No flatus  (+)liquid stool  no f/c/s  no dyspnea/ sob/ palp/ cp      Diet:  Diet, Clear Liquid:   Low Sodium  Tube Feeding Modality: Nasogastric  Glucerna 1.5 Sohail (GLUCERNA1.5RTH)  Total Volume for 24 Hours (mL): 240  Continuous  Starting Tube Feed Rate mL per Hour: 10  Until Goal Tube Feed Rate (mL per Hour): 10  Tube Feed Duration (in Hours): 24  Tube Feed Start Time: 11:10 (09-06-20 @ 11:04)      Appetite: [x  ]Poor [  ]Adequate [  ]Good  Caloric intake:  [ x  ]  Adequate   [   ] Inadequate    ROS: General/ GI see HPI  all other systems negative      ALLERGIES & MEDICATIONS  --------------------------------------------------------------------------------  No Known Allergies      STANDING INPATIENT MEDICATIONS    chlorhexidine 2% Cloths 1 Application(s) Topical <User Schedule>  cycloSPORINE  (SandIMMUNE) IVPB 15 milliGRAM(s) IV Intermittent every 24 hours  furosemide Infusion 5 mG/Hr IV Continuous <Continuous>  heparin   Injectable 5000 Unit(s) SubCutaneous every 8 hours  hydrALAZINE 50 milliGRAM(s) Oral every 8 hours  insulin regular Infusion 1 Unit(s)/Hr IV Continuous <Continuous>  methylPREDNISolone sodium succinate Injectable 8 milliGRAM(s) IV Push <User Schedule>  pantoprazole  Injectable 40 milliGRAM(s) IV Push daily  Parenteral Nutrition - Adult 1 Each TPN Continuous <Continuous>  vancomycin    Solution 500 milliGRAM(s) Oral every 6 hours  vancomycin  Retention Enema 500 milliGRAM(s) Rectal every 6 hours      PRN INPATIENT MEDICATION  benzocaine 15 mG/menthol 3.6 mG (Sugar-Free) Lozenge 1 Lozenge Oral every 6 hours PRN        VITALS/PHYSICAL EXAM  --------------------------------------------------------------------------------  T(C): 36.4 (09-08-20 @ 08:00), Max: 36.6 (09-08-20 @ 04:00)  HR: 125 (09-08-20 @ 08:56) (116 - 130)  BP: 109/70 (09-08-20 @ 07:30) (109/70 - 135/87)  RR: 26 (09-08-20 @ 08:00) (13 - 50)  SpO2: 100% (09-08-20 @ 08:56) (97% - 100%)  Wt(kg): --        09-07-20 @ 07:01  -  09-08-20 @ 07:00  --------------------------------------------------------  IN: 3948.3 mL / OUT: 5095 mL / NET: -1146.7 mL    09-08-20 @ 07:01  -  09-08-20 @ 09:18  --------------------------------------------------------  IN: 149.6 mL / OUT: 250 mL / NET: -100.4 mL    PHYSICAL EXAM:  GEN:  guarded but stable, WD/WN/WG, (+)NGT  HEENT: NC/AT, PERRL, mucosa moist & throat clear, no trachea midline w/ neck supple  GI: (-) BS, softly distended, nontender   MSK:  FROM x4, no deformities  VASCULAR: Equally warm, no cyanosis, clubbing,        Lt IJ TLC &  LUE PICC w/ dressing c/d/i,         BLE mild edema equal,  R>LUE edema- soft nontender w/o cord or erythema  Neurology; no focal deficits, weakened strength & sensation grossly intact  Psych: normal affect  Skin: warm,  moist, & w/ good turgor          LABS/ CULTURES/ RADIOLOGY:              7.6    19.12 >-----------<  131      [09-08-20 @ 01:50]              24.7     141  |  107  |  96  ----------------------------<  122      [09-08-20 @ 01:50]  4.3   |  20  |  1.28        Ca     8.1     [09-08-20 @ 01:50]      Mg     1.8     [09-08-20 @ 01:50]      Phos  4.1     [09-08-20 @ 01:50]    TPro  6.4  /  Alb  2.1  /  TBili  0.8  /  DBili  0.4  /  AST  78  /  ALT  50  /  AlkPhos  208  [09-08-20 @ 01:52]      Blood Gas Arterial - Calcium, Ionized: 1.16 mmoL/L (09-08-20 @ 05:37)  Blood Gas Arterial - Calcium, Ionized: 1.19 mmoL/L (09-08-20 @ 01:47)  Blood Gas Calcium, Ionized - Venous: 1.19 mmoL/L (09-07-20 @ 00:34)      CAPILLARY BLOOD GLUCOSE  121 (08 Sep 2020 08:00)  120 (08 Sep 2020 07:00)  112 (08 Sep 2020 06:00)  118 (08 Sep 2020 05:00)  115 (08 Sep 2020 04:00)  130 (08 Sep 2020 03:00)  122 (08 Sep 2020 02:00)  120 (08 Sep 2020 01:00)  135 (08 Sep 2020 00:00)  150 (07 Sep 2020 23:00)  112 (07 Sep 2020 22:00)  137 (07 Sep 2020 21:00)  84 (07 Sep 2020 20:00)  109 (07 Sep 2020 19:00)  118 (07 Sep 2020 18:00)  126 (07 Sep 2020 17:00)  148 (07 Sep 2020 16:26)  119 (07 Sep 2020 15:00)  129 (07 Sep 2020 14:00)  161 (07 Sep 2020 13:00)  121 (07 Sep 2020 12:00)  110 (07 Sep 2020 11:00)  111 (07 Sep 2020 10:00)    Prealbumin, Serum: 10 mg/dL (09-02-20 @ 08:12)  Prealbumin, Serum: 24 mg/dL (08-25-20 @ 15:10)      Triglycerides, Serum: 285 mg/dL (09-08-20 @ 01:52)  Triglycerides, Serum: 573 mg/dL (09-07-20 @ 00:40)  Triglycerides, Serum: 182 mg/dL (09-06-20 @ 06:34)  Triglycerides, Serum: 151 mg/dL (09-06-20 @ 05:35)  Triglycerides, Serum: 144 mg/dL (09-05-20 @ 11:36)    < from: Xray Chest 1 View- PORTABLE-Routine (09.07.20 @ 03:41) >  INTERPRETATION:  XR CHEST. One view.    INDICATION: s/p cardiothoracic surgery    COMPARISON: 9/6/2020    FINDINGS/  IMPRESSION:    Enteric tube courses below the diaphragmatic. Left IJ central line within SVC    The right hemidiaphragm remains elevated. Right pleural effusion is unchanged.    < end of copied text > [facility] NUTRITION SUPPORT / TPN -- FOLLOW UP NOTE  --------------------------------------------------------------------------------    24 hour events/subjective:    no abdominal pain- a little less bloated  tolerating TF w/o N/ V- held as pt was put on Bipap  No flatus  (+)liquid stool  no f/c/s  no dyspnea/ sob/ palp/ cp  feels swollen- encouraged leg/ arm elevation      Diet:  Diet, Clear Liquid:   Low Sodium  Tube Feeding Modality: Nasogastric  Glucerna 1.5 Sohail (GLUCERNA1.5RTH)  Total Volume for 24 Hours (mL): 240  Continuous  Starting Tube Feed Rate mL per Hour: 10  Until Goal Tube Feed Rate (mL per Hour): 10  Tube Feed Duration (in Hours): 24  Tube Feed Start Time: 11:10 (09-06-20 @ 11:04)      Appetite: [x  ]Poor [  ]Adequate [  ]Good  Caloric intake:  [ x  ]  Adequate   [   ] Inadequate    ROS: General/ GI see HPI  all other systems negative      ALLERGIES & MEDICATIONS  --------------------------------------------------------------------------------  No Known Allergies      STANDING INPATIENT MEDICATIONS    chlorhexidine 2% Cloths 1 Application(s) Topical <User Schedule>  cycloSPORINE  (SandIMMUNE) IVPB 15 milliGRAM(s) IV Intermittent every 24 hours  furosemide Infusion 5 mG/Hr IV Continuous <Continuous>  heparin   Injectable 5000 Unit(s) SubCutaneous every 8 hours  hydrALAZINE 50 milliGRAM(s) Oral every 8 hours  insulin regular Infusion 1 Unit(s)/Hr IV Continuous <Continuous>  methylPREDNISolone sodium succinate Injectable 8 milliGRAM(s) IV Push <User Schedule>  pantoprazole  Injectable 40 milliGRAM(s) IV Push daily  Parenteral Nutrition - Adult 1 Each TPN Continuous <Continuous>  vancomycin    Solution 500 milliGRAM(s) Oral every 6 hours  vancomycin  Retention Enema 500 milliGRAM(s) Rectal every 6 hours      PRN INPATIENT MEDICATION  benzocaine 15 mG/menthol 3.6 mG (Sugar-Free) Lozenge 1 Lozenge Oral every 6 hours PRN        VITALS/PHYSICAL EXAM  --------------------------------------------------------------------------------  T(C): 36.4 (09-08-20 @ 08:00), Max: 36.6 (09-08-20 @ 04:00)  HR: 125 (09-08-20 @ 08:56) (116 - 130)  BP: 109/70 (09-08-20 @ 07:30) (109/70 - 135/87)  RR: 26 (09-08-20 @ 08:00) (13 - 50)  SpO2: 100% (09-08-20 @ 08:56) (97% - 100%)  Wt(kg): --        09-07-20 @ 07:01  -  09-08-20 @ 07:00  --------------------------------------------------------  IN: 3948.3 mL / OUT: 5095 mL / NET: -1146.7 mL    09-08-20 @ 07:01  -  09-08-20 @ 09:18  --------------------------------------------------------  IN: 149.6 mL / OUT: 250 mL / NET: -100.4 mL    PHYSICAL EXAM:  GEN:  guarded but stable, WD/WN/WG, (+)NGT  HEENT: NC/AT, PERRL, mucosa moist & throat clear, no trachea midline w/ neck supple  GI: (-) BS, softly distended, nontender   MSK:  FROM x4, no deformities  VASCULAR: Equally warm, no cyanosis, clubbing,        Lt IJ TLC &  LUE PICC w/ dressing c/d/i,         BLE mild edema equal,  R>LUE edema- soft nontender w/o cord or erythema  Neurology; no focal deficits, weakened strength & sensation grossly intact  Psych: normal affect  Skin: warm,  moist, & w/ good turgor          LABS/ CULTURES/ RADIOLOGY:              7.6    19.12 >-----------<  131      [09-08-20 @ 01:50]              24.7     141  |  107  |  96  ----------------------------<  122      [09-08-20 @ 01:50]  4.3   |  20  |  1.28        Ca     8.1     [09-08-20 @ 01:50]      Mg     1.8     [09-08-20 @ 01:50]      Phos  4.1     [09-08-20 @ 01:50]    TPro  6.4  /  Alb  2.1  /  TBili  0.8  /  DBili  0.4  /  AST  78  /  ALT  50  /  AlkPhos  208  [09-08-20 @ 01:52]      Blood Gas Arterial - Calcium, Ionized: 1.16 mmoL/L (09-08-20 @ 05:37)  Blood Gas Arterial - Calcium, Ionized: 1.19 mmoL/L (09-08-20 @ 01:47)  Blood Gas Calcium, Ionized - Venous: 1.19 mmoL/L (09-07-20 @ 00:34)      CAPILLARY BLOOD GLUCOSE  121 (08 Sep 2020 08:00)  120 (08 Sep 2020 07:00)  112 (08 Sep 2020 06:00)  118 (08 Sep 2020 05:00)  115 (08 Sep 2020 04:00)  130 (08 Sep 2020 03:00)  122 (08 Sep 2020 02:00)  120 (08 Sep 2020 01:00)  135 (08 Sep 2020 00:00)  150 (07 Sep 2020 23:00)  112 (07 Sep 2020 22:00)  137 (07 Sep 2020 21:00)  84 (07 Sep 2020 20:00)  109 (07 Sep 2020 19:00)  118 (07 Sep 2020 18:00)  126 (07 Sep 2020 17:00)  148 (07 Sep 2020 16:26)  119 (07 Sep 2020 15:00)  129 (07 Sep 2020 14:00)  161 (07 Sep 2020 13:00)  121 (07 Sep 2020 12:00)  110 (07 Sep 2020 11:00)  111 (07 Sep 2020 10:00)    Prealbumin, Serum: 10 mg/dL (09-02-20 @ 08:12)  Prealbumin, Serum: 24 mg/dL (08-25-20 @ 15:10)      Triglycerides, Serum: 285 mg/dL (09-08-20 @ 01:52)  Triglycerides, Serum: 573 mg/dL (09-07-20 @ 00:40)  Triglycerides, Serum: 182 mg/dL (09-06-20 @ 06:34)  Triglycerides, Serum: 151 mg/dL (09-06-20 @ 05:35)  Triglycerides, Serum: 144 mg/dL (09-05-20 @ 11:36)    < from: Xray Chest 1 View- PORTABLE-Routine (09.07.20 @ 03:41) >  INTERPRETATION:  XR CHEST. One view.    INDICATION: s/p cardiothoracic surgery    COMPARISON: 9/6/2020    FINDINGS/  IMPRESSION:    Enteric tube courses below the diaphragmatic. Left IJ central line within SVC    The right hemidiaphragm remains elevated. Right pleural effusion is unchanged. [facility] NUTRITION SUPPORT / TPN -- FOLLOW UP NOTE  --------------------------------------------------------------------------------    24 hour events/subjective:    no abdominal pain- a little less bloated  tolerating TF w/o N/ V- held as pt was put on Bipap  No flatus  (+)liquid stool  no f/c/s  no dyspnea/ sob/ palp/ cp  feels swollen- encouraged leg/ arm elevation      Diet:  Diet, Clear Liquid:   Low Sodium  Tube Feeding Modality: Nasogastric  Glucerna 1.5 Sohail (GLUCERNA1.5RTH)  Total Volume for 24 Hours (mL): 240  Continuous  Starting Tube Feed Rate mL per Hour: 10  Until Goal Tube Feed Rate (mL per Hour): 10  Tube Feed Duration (in Hours): 24  Tube Feed Start Time: 11:10 (09-06-20 @ 11:04)      Appetite: [x  ]Poor [  ]Adequate [  ]Good  Caloric intake:  [ x  ]  Adequate   [   ] Inadequate    ROS: General/ GI see HPI  all other systems negative      ALLERGIES & MEDICATIONS  --------------------------------------------------------------------------------  No Known Allergies      STANDING INPATIENT MEDICATIONS    chlorhexidine 2% Cloths 1 Application(s) Topical <User Schedule>  cycloSPORINE  (SandIMMUNE) IVPB 15 milliGRAM(s) IV Intermittent every 24 hours  furosemide Infusion 5 mG/Hr IV Continuous <Continuous>  heparin   Injectable 5000 Unit(s) SubCutaneous every 8 hours  hydrALAZINE 50 milliGRAM(s) Oral every 8 hours  insulin regular Infusion 1 Unit(s)/Hr IV Continuous <Continuous>  methylPREDNISolone sodium succinate Injectable 8 milliGRAM(s) IV Push <User Schedule>  pantoprazole  Injectable 40 milliGRAM(s) IV Push daily  Parenteral Nutrition - Adult 1 Each TPN Continuous <Continuous>  vancomycin    Solution 500 milliGRAM(s) Oral every 6 hours  vancomycin  Retention Enema 500 milliGRAM(s) Rectal every 6 hours      PRN INPATIENT MEDICATION  benzocaine 15 mG/menthol 3.6 mG (Sugar-Free) Lozenge 1 Lozenge Oral every 6 hours PRN        VITALS/PHYSICAL EXAM  --------------------------------------------------------------------------------  T(C): 36.4 (09-08-20 @ 08:00), Max: 36.6 (09-08-20 @ 04:00)  HR: 125 (09-08-20 @ 08:56) (116 - 130)  BP: 109/70 (09-08-20 @ 07:30) (109/70 - 135/87)  RR: 26 (09-08-20 @ 08:00) (13 - 50)  SpO2: 100% (09-08-20 @ 08:56) (97% - 100%)  Wt(kg): --        09-07-20 @ 07:01  -  09-08-20 @ 07:00  --------------------------------------------------------  IN: 3948.3 mL / OUT: 5095 mL / NET: -1146.7 mL    09-08-20 @ 07:01  -  09-08-20 @ 09:18  --------------------------------------------------------  IN: 149.6 mL / OUT: 250 mL / NET: -100.4 mL    PHYSICAL EXAM:  GEN:  guarded but stable, WD/WN/WG, (+)KO FT  HEENT: NC/AT, PERRL, mucosa moist & throat clear, no trachea midline w/ neck supple  GI: (-) BS, softly distended, nontender   MSK:  FROM x4, no deformities  VASCULAR: Equally warm, no cyanosis, clubbing,        Lt IJ TLC &  LUE PICC w/ dressing c/d/i,         BLE mild edema equal,  R>LUE edema- soft nontender w/o cord or erythema  Neurology; no focal deficits, weakened strength & sensation grossly intact  Psych: normal affect  Skin: warm,  moist, & w/ good turgor          LABS/ CULTURES/ RADIOLOGY:              7.6    19.12 >-----------<  131      [09-08-20 @ 01:50]              24.7     141  |  107  |  96  ----------------------------<  122      [09-08-20 @ 01:50]  4.3   |  20  |  1.28        Ca     8.1     [09-08-20 @ 01:50]      Mg     1.8     [09-08-20 @ 01:50]      Phos  4.1     [09-08-20 @ 01:50]    TPro  6.4  /  Alb  2.1  /  TBili  0.8  /  DBili  0.4  /  AST  78  /  ALT  50  /  AlkPhos  208  [09-08-20 @ 01:52]      Blood Gas Arterial - Calcium, Ionized: 1.16 mmoL/L (09-08-20 @ 05:37)  Blood Gas Arterial - Calcium, Ionized: 1.19 mmoL/L (09-08-20 @ 01:47)  Blood Gas Calcium, Ionized - Venous: 1.19 mmoL/L (09-07-20 @ 00:34)      CAPILLARY BLOOD GLUCOSE  121 (08 Sep 2020 08:00)  120 (08 Sep 2020 07:00)  112 (08 Sep 2020 06:00)  118 (08 Sep 2020 05:00)  115 (08 Sep 2020 04:00)  130 (08 Sep 2020 03:00)  122 (08 Sep 2020 02:00)  120 (08 Sep 2020 01:00)  135 (08 Sep 2020 00:00)  150 (07 Sep 2020 23:00)  112 (07 Sep 2020 22:00)  137 (07 Sep 2020 21:00)  84 (07 Sep 2020 20:00)  109 (07 Sep 2020 19:00)  118 (07 Sep 2020 18:00)  126 (07 Sep 2020 17:00)  148 (07 Sep 2020 16:26)  119 (07 Sep 2020 15:00)  129 (07 Sep 2020 14:00)  161 (07 Sep 2020 13:00)  121 (07 Sep 2020 12:00)  110 (07 Sep 2020 11:00)  111 (07 Sep 2020 10:00)    Prealbumin, Serum: 10 mg/dL (09-02-20 @ 08:12)  Prealbumin, Serum: 24 mg/dL (08-25-20 @ 15:10)      Triglycerides, Serum: 285 mg/dL (09-08-20 @ 01:52)  Triglycerides, Serum: 573 mg/dL (09-07-20 @ 00:40)  Triglycerides, Serum: 182 mg/dL (09-06-20 @ 06:34)  Triglycerides, Serum: 151 mg/dL (09-06-20 @ 05:35)  Triglycerides, Serum: 144 mg/dL (09-05-20 @ 11:36)    < from: Xray Chest 1 View- PORTABLE-Routine (09.07.20 @ 03:41) >  INTERPRETATION:  XR CHEST. One view.    INDICATION: s/p cardiothoracic surgery    COMPARISON: 9/6/2020    FINDINGS/  IMPRESSION:    Enteric tube courses below the diaphragmatic. Left IJ central line within SVC    The right hemidiaphragm remains elevated. Right pleural effusion is unchanged.

## 2020-09-08 NOTE — PROGRESS NOTE ADULT - PROBLEM SELECTOR PLAN 3
- Resolved. He is responding well to IV diuretics. We will continue to give diuretics in order to mobilize and resolve his anasarca. - Resolved, but continue to monitor on lasix infusion.  - Continue lasix 5 mg/hr as he is responding very well, but remains with anasarca.

## 2020-09-09 ENCOUNTER — APPOINTMENT (OUTPATIENT)
Dept: HEART FAILURE | Facility: CLINIC | Age: 70
End: 2020-09-09

## 2020-09-09 LAB
ALBUMIN SERPL ELPH-MCNC: 2.6 G/DL — LOW (ref 3.3–5)
ALP SERPL-CCNC: 210 U/L — HIGH (ref 40–120)
ALT FLD-CCNC: 45 U/L — SIGNIFICANT CHANGE UP (ref 10–45)
ANION GAP SERPL CALC-SCNC: 12 MMOL/L — SIGNIFICANT CHANGE UP (ref 5–17)
AST SERPL-CCNC: 72 U/L — HIGH (ref 10–40)
BILIRUB SERPL-MCNC: 0.9 MG/DL — SIGNIFICANT CHANGE UP (ref 0.2–1.2)
BLD GP AB SCN SERPL QL: NEGATIVE — SIGNIFICANT CHANGE UP
BUN SERPL-MCNC: 98 MG/DL — HIGH (ref 7–23)
CALCIUM SERPL-MCNC: 8.3 MG/DL — LOW (ref 8.4–10.5)
CHLORIDE SERPL-SCNC: 100 MMOL/L — SIGNIFICANT CHANGE UP (ref 96–108)
CO2 SERPL-SCNC: 25 MMOL/L — SIGNIFICANT CHANGE UP (ref 22–31)
CREAT SERPL-MCNC: 1.34 MG/DL — HIGH (ref 0.5–1.3)
CULTURE RESULTS: SIGNIFICANT CHANGE UP
CYCLOSPORINE SER-MCNC: 41 NG/ML — LOW (ref 150–400)
DAT C3-SP REAG RBC QL: NEGATIVE — SIGNIFICANT CHANGE UP
DAT POLY-SP REAG RBC QL: POSITIVE — SIGNIFICANT CHANGE UP
DIRECT COOMBS IGG: POSITIVE — SIGNIFICANT CHANGE UP
ELUATE ANTIBODY 1: SIGNIFICANT CHANGE UP
GAS PNL BLDA: SIGNIFICANT CHANGE UP
GLUCOSE BLDC GLUCOMTR-MCNC: 110 MG/DL — HIGH (ref 70–99)
GLUCOSE BLDC GLUCOMTR-MCNC: 111 MG/DL — HIGH (ref 70–99)
GLUCOSE BLDC GLUCOMTR-MCNC: 113 MG/DL — HIGH (ref 70–99)
GLUCOSE BLDC GLUCOMTR-MCNC: 115 MG/DL — HIGH (ref 70–99)
GLUCOSE BLDC GLUCOMTR-MCNC: 116 MG/DL — HIGH (ref 70–99)
GLUCOSE BLDC GLUCOMTR-MCNC: 117 MG/DL — HIGH (ref 70–99)
GLUCOSE BLDC GLUCOMTR-MCNC: 122 MG/DL — HIGH (ref 70–99)
GLUCOSE BLDC GLUCOMTR-MCNC: 122 MG/DL — HIGH (ref 70–99)
GLUCOSE BLDC GLUCOMTR-MCNC: 124 MG/DL — HIGH (ref 70–99)
GLUCOSE BLDC GLUCOMTR-MCNC: 128 MG/DL — HIGH (ref 70–99)
GLUCOSE BLDC GLUCOMTR-MCNC: 129 MG/DL — HIGH (ref 70–99)
GLUCOSE BLDC GLUCOMTR-MCNC: 139 MG/DL — HIGH (ref 70–99)
GLUCOSE BLDC GLUCOMTR-MCNC: 141 MG/DL — HIGH (ref 70–99)
GLUCOSE BLDC GLUCOMTR-MCNC: 143 MG/DL — HIGH (ref 70–99)
GLUCOSE BLDC GLUCOMTR-MCNC: 144 MG/DL — HIGH (ref 70–99)
GLUCOSE BLDC GLUCOMTR-MCNC: 147 MG/DL — HIGH (ref 70–99)
GLUCOSE BLDC GLUCOMTR-MCNC: 159 MG/DL — HIGH (ref 70–99)
GLUCOSE BLDC GLUCOMTR-MCNC: 175 MG/DL — HIGH (ref 70–99)
GLUCOSE BLDC GLUCOMTR-MCNC: 178 MG/DL — HIGH (ref 70–99)
GLUCOSE BLDC GLUCOMTR-MCNC: 184 MG/DL — HIGH (ref 70–99)
GLUCOSE BLDC GLUCOMTR-MCNC: 220 MG/DL — HIGH (ref 70–99)
GLUCOSE SERPL-MCNC: 111 MG/DL — HIGH (ref 70–99)
HCT VFR BLD CALC: 22.8 % — LOW (ref 39–50)
HGB BLD-MCNC: 7.2 G/DL — LOW (ref 13–17)
LDH SERPL L TO P-CCNC: 722 U/L — HIGH (ref 50–242)
MAGNESIUM SERPL-MCNC: 1.8 MG/DL — SIGNIFICANT CHANGE UP (ref 1.6–2.6)
MCHC RBC-ENTMCNC: 31.6 GM/DL — LOW (ref 32–36)
MCHC RBC-ENTMCNC: 32.3 PG — SIGNIFICANT CHANGE UP (ref 27–34)
MCV RBC AUTO: 102.2 FL — HIGH (ref 80–100)
NRBC # BLD: 6 /100 WBCS — HIGH (ref 0–0)
PHOSPHATE SERPL-MCNC: 4.5 MG/DL — SIGNIFICANT CHANGE UP (ref 2.5–4.5)
PLATELET # BLD AUTO: 123 K/UL — LOW (ref 150–400)
POTASSIUM SERPL-MCNC: 4.4 MMOL/L — SIGNIFICANT CHANGE UP (ref 3.5–5.3)
POTASSIUM SERPL-SCNC: 4.4 MMOL/L — SIGNIFICANT CHANGE UP (ref 3.5–5.3)
PROT SERPL-MCNC: 6.5 G/DL — SIGNIFICANT CHANGE UP (ref 6–8.3)
RBC # BLD: 2.21 M/UL — LOW (ref 4.2–5.8)
RBC # BLD: 2.23 M/UL — LOW (ref 4.2–5.8)
RBC # FLD: 25.6 % — HIGH (ref 10.3–14.5)
RETICS #: 224.3 K/UL — HIGH (ref 25–125)
RETICS/RBC NFR: 10.2 % — HIGH (ref 0.5–2.5)
RH IG SCN BLD-IMP: POSITIVE — SIGNIFICANT CHANGE UP
SODIUM SERPL-SCNC: 137 MMOL/L — SIGNIFICANT CHANGE UP (ref 135–145)
SPECIMEN SOURCE: SIGNIFICANT CHANGE UP
TRIGL SERPL-MCNC: 289 MG/DL — HIGH (ref 10–149)
WBC # BLD: 17.81 K/UL — HIGH (ref 3.8–10.5)
WBC # FLD AUTO: 17.81 K/UL — HIGH (ref 3.8–10.5)

## 2020-09-09 PROCEDURE — 99291 CRITICAL CARE FIRST HOUR: CPT

## 2020-09-09 PROCEDURE — 86077 PHYS BLOOD BANK SERV XMATCH: CPT

## 2020-09-09 PROCEDURE — 99233 SBSQ HOSP IP/OBS HIGH 50: CPT

## 2020-09-09 PROCEDURE — 99232 SBSQ HOSP IP/OBS MODERATE 35: CPT

## 2020-09-09 PROCEDURE — 99232 SBSQ HOSP IP/OBS MODERATE 35: CPT | Mod: GC

## 2020-09-09 PROCEDURE — 71045 X-RAY EXAM CHEST 1 VIEW: CPT | Mod: 26

## 2020-09-09 PROCEDURE — 99292 CRITICAL CARE ADDL 30 MIN: CPT

## 2020-09-09 RX ORDER — ELECTROLYTE SOLUTION,INJ
1 VIAL (ML) INTRAVENOUS
Refills: 0 | Status: DISCONTINUED | OUTPATIENT
Start: 2020-09-09 | End: 2020-09-09

## 2020-09-09 RX ORDER — I.V. FAT EMULSION 20 G/100ML
8.3 EMULSION INTRAVENOUS
Qty: 20 | Refills: 0 | Status: DISCONTINUED | OUTPATIENT
Start: 2020-09-09 | End: 2020-09-09

## 2020-09-09 RX ORDER — CYCLOSPORINE 100 MG/1
20 CAPSULE ORAL EVERY 24 HOURS
Refills: 0 | Status: DISCONTINUED | OUTPATIENT
Start: 2020-09-09 | End: 2020-09-13

## 2020-09-09 RX ORDER — I.V. FAT EMULSION 20 G/100ML
8.3 EMULSION INTRAVENOUS
Qty: 20 | Refills: 0 | Status: DISCONTINUED | OUTPATIENT
Start: 2020-09-09 | End: 2020-09-10

## 2020-09-09 RX ADMIN — Medication 50 MILLIGRAM(S): at 21:28

## 2020-09-09 RX ADMIN — I.V. FAT EMULSION 8.3 ML/HR: 20 EMULSION INTRAVENOUS at 16:16

## 2020-09-09 RX ADMIN — Medication 500 MILLIGRAM(S): at 03:00

## 2020-09-09 RX ADMIN — HEPARIN SODIUM 5000 UNIT(S): 5000 INJECTION INTRAVENOUS; SUBCUTANEOUS at 21:28

## 2020-09-09 RX ADMIN — HEPARIN SODIUM 5000 UNIT(S): 5000 INJECTION INTRAVENOUS; SUBCUTANEOUS at 13:45

## 2020-09-09 RX ADMIN — Medication 500 MILLIGRAM(S): at 08:11

## 2020-09-09 RX ADMIN — Medication 50 MILLIGRAM(S): at 13:45

## 2020-09-09 RX ADMIN — CHLORHEXIDINE GLUCONATE 1 APPLICATION(S): 213 SOLUTION TOPICAL at 05:42

## 2020-09-09 RX ADMIN — PANTOPRAZOLE SODIUM 40 MILLIGRAM(S): 20 TABLET, DELAYED RELEASE ORAL at 11:01

## 2020-09-09 RX ADMIN — Medication 50 MILLIGRAM(S): at 05:42

## 2020-09-09 RX ADMIN — Medication 2.5 MG/HR: at 18:42

## 2020-09-09 RX ADMIN — Medication 1 EACH: at 17:01

## 2020-09-09 RX ADMIN — CYCLOSPORINE 2.1 MILLIGRAM(S): 100 CAPSULE ORAL at 13:46

## 2020-09-09 RX ADMIN — Medication 500 MILLIGRAM(S): at 18:43

## 2020-09-09 RX ADMIN — Medication 500 MILLIGRAM(S): at 14:13

## 2020-09-09 RX ADMIN — HEPARIN SODIUM 5000 UNIT(S): 5000 INJECTION INTRAVENOUS; SUBCUTANEOUS at 05:42

## 2020-09-09 RX ADMIN — Medication 500 MILLIGRAM(S): at 13:45

## 2020-09-09 RX ADMIN — CYCLOSPORINE 2.1 MILLIGRAM(S): 100 CAPSULE ORAL at 11:01

## 2020-09-09 RX ADMIN — Medication 500 MILLIGRAM(S): at 21:28

## 2020-09-09 RX ADMIN — Medication 500 MILLIGRAM(S): at 00:00

## 2020-09-09 RX ADMIN — Medication 500 MILLIGRAM(S): at 05:42

## 2020-09-09 RX ADMIN — Medication 8 MILLIGRAM(S): at 08:10

## 2020-09-09 NOTE — PROGRESS NOTE ADULT - PROBLEM SELECTOR PLAN 1
- Improving.    - Continue oral and rectal Vancomycin per Transplant ID. Fecal transplant not currently an option.

## 2020-09-09 NOTE — PROGRESS NOTE ADULT - SUBJECTIVE AND OBJECTIVE BOX
Follow Up:  c diff    Interval History/ROS: patient appears discouraged, notes stomach pains    Allergies  No Known Allergies    ANTIMICROBIALS:  vancomycin    Solution 500 every 6 hours  vancomycin  Retention Enema 500 every 6 hours      OTHER MEDS:  MEDICATIONS  (STANDING):  cycloSPORINE  (SandIMMUNE) IVPB 20 every 24 hours  furosemide Infusion 5 <Continuous>  heparin   Injectable 5000 every 8 hours  hydrALAZINE 50 every 8 hours  insulin regular Infusion 1 <Continuous>  pantoprazole  Injectable 40 daily    Vital Signs Last 24 Hrs  T(C): 36.8 (09 Sep 2020 16:00), Max: 37.1 (09 Sep 2020 00:00)  T(F): 98.2 (09 Sep 2020 16:00), Max: 98.8 (09 Sep 2020 00:00)  HR: 122 (09 Sep 2020 16:00) (114 - 126)  BP: --  BP(mean): --  RR: 25 (09 Sep 2020 16:00) (11 - 42)  SpO2: 100% (09 Sep 2020 16:00) (84% - 100%)    PHYSICAL EXAM:  General:  NAD, Non-toxic  NGT in place  Neurology: A&Ox3, nonfocal  Respiratory: Clear to auscultation bilaterally  CV: RRR, S1S2, no murmurs, rubs or gallops  Abdominal: Soft, Non-tender, distended, positive bowel sounds  Extremities: No edema, + peripheral pulses  Line Sites: Clear  Skin: No rash                        7.2    17.81 )-----------( 123      ( 09 Sep 2020 00:29 )             22.8   WBC Count: 17.81 (09-09 @ 00:29)  WBC Count: 19.12 (09-08 @ 01:50)  WBC Count: 19.12 (09-07 @ 00:40)  WBC Count: 17.60 (09-06 @ 06:34)  WBC Count: 15.32 (09-06 @ 05:35)  WBC Count: 20.53 (09-05 @ 00:30    09-09    137  |  100  |  98<H>  ----------------------------<  111<H>  4.4   |  25  |  1.34<H>    Ca    8.3<L>      09 Sep 2020 00:29  Phos  4.5     09-09  Mg     1.8     09-09    TPro  6.5  /  Alb  2.6<L>  /  TBili  0.9  /  DBili  x   /  AST  72<H>  /  ALT  45  /  AlkPhos  210<H>  09-09      MICROBIOLOGY:  .Sputum Sputum  09-08-20   No growth  --    No polymorphonuclear leukocytes per low power field  Rare Squamous epithelial cells per low power field  No organisms seen      .Blood Blood-Peripheral  09-08-20   No growth to date.  --  --      .Blood Blood-Peripheral  09-04-20   No Growth Final  --  --      .Blood Triple Lumen BROWN  08-25-20   No Growth Final  --  --      .Blood Blood  08-24-20   No Growth Final  --  --      .Stool Feces  08-23-20   GI PCR Results: NOT detected    .Urine Clean Catch (Midstream)  08-14-20   No growth  --  --      .Blood Blood  08-14-20   No Growth Final  --  --      .Blood Blood-Peripheral  08-13-20   Growth in aerobic and anaerobic bottles: Klebsiella oxytoca/Raoutella  ornithinolytica  See previous culture [id number]  --    Growth in anaerobic bottle: Gram Negative Rods  Growth in aerobic bottle: Gram Negative Rods      .Blood Blood-Peripheral  08-13-20   Growth in anaerobic bottle: Klebsiella oxytoca/Raoutella ornithinolytica    .Urine Clean Catch (Midstream)  08-13-20   >=3 organisms. Probable collection contamination.  --  --    RADIOLOGY:  < from: Xray Chest 1 View- PORTABLE-Routine (09.09.20 @ 03:43) >  FINDINGS/  IMPRESSION:  Enteric tube tip in descending duodenum.  Left IJ central venous catheter and left upper extremity PICC line are unchanged.  S/P sternotomy.  Difficult to assess heart size.  Right lung is clear.  Mild increase in right pleural effusion -there is associated right basilar atelectasis.  No pneumothorax.    < end of copied text >      Ricky Pope MD; Division of Infectious Disease; Pager: [phone]; nights and weekends: [phone]

## 2020-09-09 NOTE — PROGRESS NOTE ADULT - SUBJECTIVE AND OBJECTIVE BOX
[facility] NUTRITION SUPPORT / TPN -- FOLLOW UP NOTE  --------------------------------------------------------------------------------    24 hour events/subjective:    no abdominal pain  tolerating TF w/o N/ V- held as pt was put on Bipap  No flatus  no BM  no f/c/s  no dyspnea/ sob/ palp/ cp        Diet:  Diet, Clear Liquid:   Consistent Carbohydrate No Snacks (CSTCHO)  Low Sodium  Tube Feeding Modality: Nasogastric  Glucerna 1.5 Sohail (GLUCERNA1.5RTH)  Total Volume for 24 Hours (mL): 240  Continuous  Starting Tube Feed Rate mL per Hour: 10  Until Goal Tube Feed Rate (mL per Hour): 10  Tube Feed Duration (in Hours): 24  Tube Feed Start Time: 11:10 (09-08-20 @ 13:35)      Appetite: [ x ]Poor [  ]Adequate [  ]Good  Caloric intake:  [   ]  Adequate   [ x  ] Inadequate    ROS: General/ GI see HPI  all other systems negative      ALLERGIES & MEDICATIONS  --------------------------------------------------------------------------------  No Known Allergies    STANDING INPATIENT MEDICATIONS    albumin human 25% IVPB 50 milliLiter(s) IV Intermittent every 10 minutes  chlorhexidine 2% Cloths 1 Application(s) Topical <User Schedule>  cycloSPORINE  (SandIMMUNE) IVPB 18 milliGRAM(s) IV Intermittent every 24 hours  fat emulsion (Fish Oil and Plant Based) 20% Infusion 8.3 mL/Hr IV Continuous <Continuous>  furosemide Infusion 5 mG/Hr IV Continuous <Continuous>  heparin   Injectable 5000 Unit(s) SubCutaneous every 8 hours  hydrALAZINE 50 milliGRAM(s) Oral every 8 hours  insulin regular Infusion 1 Unit(s)/Hr IV Continuous <Continuous>  methylPREDNISolone sodium succinate Injectable 8 milliGRAM(s) IV Push <User Schedule>  pantoprazole  Injectable 40 milliGRAM(s) IV Push daily  Parenteral Nutrition - Adult 1 Each TPN Continuous <Continuous>  vancomycin    Solution 500 milliGRAM(s) Oral every 6 hours  vancomycin  Retention Enema 500 milliGRAM(s) Rectal every 6 hours      PRN INPATIENT MEDICATION  benzocaine 15 mG/menthol 3.6 mG (Sugar-Free) Lozenge 1 Lozenge Oral every 6 hours PRN        VITALS/PHYSICAL EXAM  --------------------------------------------------------------------------------  T(C): 36.5 (09-09-20 @ 08:00), Max: 37.1 (09-09-20 @ 00:00)  HR: 125 (09-09-20 @ 08:00) (114 - 133)  BP: --  RR: 32 (09-09-20 @ 08:00) (11 - 42)  SpO2: 100% (09-09-20 @ 08:00) (94% - 100%)  Wt(kg): --        09-08-20 @ 07:01  -  09-09-20 @ 07:00  --------------------------------------------------------  IN: 3552.5 mL / OUT: 6480 mL / NET: -2927.5 mL    09-09-20 @ 07:01  -  09-09-20 @ 10:19  --------------------------------------------------------  IN: 139.5 mL / OUT: 200 mL / NET: -60.5 mL    PHYSICAL EXAM:  GEN:  guarded but stable, WD/WN/WG, (+)KO FT  HEENT: NC/AT, PERRL, mucosa moist & throat clear, no trachea midline w/ neck supple  GI: (-) BS, softly distended, nontender   MSK:  FROM x4, no deformities  VASCULAR: Equally warm, no cyanosis, clubbing,        Lt IJ TLC &  LUE PICC w/ dressing c/d/i,         BLE edema equal,  R>LUE edema- soft nontender w/o cord or erythema  Neurology; no focal deficits, weakened strength & sensation grossly intact  Psych: normal affect  Skin: warm,  moist, & w/ good turgor        LABS/ CULTURES/ RADIOLOGY:              7.2    17.81 >-----------<  123      [09-09-20 @ 00:29]              22.8     137  |  100  |  98  ----------------------------<  111      [09-09-20 @ 00:29]  4.4   |  25  |  1.34        Ca     8.3     [09-09-20 @ 00:29]      Mg     1.8     [09-09-20 @ 00:29]      Phos  4.5     [09-09-20 @ 00:29]    TPro  6.5  /  Alb  2.6  /  TBili  0.9  /  DBili  x   /  AST  72  /  ALT  45  /  AlkPhos  210  [09-09-20 @ 00:29]          [09-08-20 @ 13:49]    Blood Gas Arterial - Calcium, Ionized: 1.16 mmoL/L (09-09-20 @ 00:27)  Blood Gas Arterial - Calcium, Ionized: 1.16 mmoL/L (09-08-20 @ 15:28)      CAPILLARY BLOOD GLUCOSE  POCT Blood Glucose.: 220 mg/dL (09 Sep 2020 10:12)  POCT Blood Glucose.: 139 mg/dL (09 Sep 2020 08:59)  POCT Blood Glucose.: 110 mg/dL (09 Sep 2020 07:48)  POCT Blood Glucose.: 111 mg/dL (09 Sep 2020 07:33)  POCT Blood Glucose.: 113 mg/dL (09 Sep 2020 05:58)  POCT Blood Glucose.: 122 mg/dL (09 Sep 2020 05:16)  POCT Blood Glucose.: 128 mg/dL (09 Sep 2020 04:09)  POCT Blood Glucose.: 116 mg/dL (09 Sep 2020 03:02)  POCT Blood Glucose.: 115 mg/dL (09 Sep 2020 02:10)  POCT Blood Glucose.: 115 mg/dL (08 Sep 2020 23:03)  POCT Blood Glucose.: 130 mg/dL (08 Sep 2020 21:10)  POCT Blood Glucose.: 135 mg/dL (08 Sep 2020 20:15)  POCT Blood Glucose.: 136 mg/dL (08 Sep 2020 18:55)  POCT Blood Glucose.: 134 mg/dL (08 Sep 2020 18:08)  POCT Blood Glucose.: 139 mg/dL (08 Sep 2020 17:07)  POCT Blood Glucose.: 148 mg/dL (08 Sep 2020 16:02)  POCT Blood Glucose.: 156 mg/dL (08 Sep 2020 15:15)  POCT Blood Glucose.: 151 mg/dL (08 Sep 2020 13:45)  POCT Blood Glucose.: 156 mg/dL (08 Sep 2020 13:14)  POCT Blood Glucose.: 147 mg/dL (08 Sep 2020 12:00)    Prealbumin, Serum: 22 mg/dL (09-08-20 @ 16:22)  Prealbumin, Serum: 10 mg/dL (09-02-20 @ 08:12)  Prealbumin, Serum: 24 mg/dL (08-25-20 @ 15:10)      Triglycerides, Serum: 289 mg/dL (09-09-20 @ 00:29)  Triglycerides, Serum: 326 mg/dL (09-08-20 @ 11:05)  Triglycerides, Serum: 285 mg/dL (09-08-20 @ 01:52)  Triglycerides, Serum: 573 mg/dL (09-07-20 @ 00:40)  Triglycerides, Serum: 182 mg/dL (09-06-20 @ 06:34)            Culture - Blood (collected 09-08-20 @ 04:41)  Source: .Blood Blood-Peripheral  Preliminary Report (09-09-20 @ 05:01):    No growth to date. [facility] NUTRITION SUPPORT / TPN -- FOLLOW UP NOTE  --------------------------------------------------------------------------------    24 hour events/subjective:    tolerated bronch yesterday  no abdominal pain  tolerating trickle TF w/o N/ V- held while pt was put on Bipap  No flatus/ BM  no f/c/s  no dyspnea/ sob/ palp/ cp        Diet:  Diet, Clear Liquid:   Consistent Carbohydrate No Snacks (CSTCHO)  Low Sodium  Tube Feeding Modality: Nasogastric  Glucerna 1.5 Sohail (GLUCERNA1.5RTH)  Total Volume for 24 Hours (mL): 240  Continuous  Starting Tube Feed Rate mL per Hour: 10  Until Goal Tube Feed Rate (mL per Hour): 10  Tube Feed Duration (in Hours): 24  Tube Feed Start Time: 11:10 (09-08-20 @ 13:35)      Appetite: [ x ]Poor [  ]Adequate [  ]Good  Caloric intake:  [   ]  Adequate   [ x  ] Inadequate    ROS: General/ GI see HPI  all other systems negative      ALLERGIES & MEDICATIONS  --------------------------------------------------------------------------------  No Known Allergies    STANDING INPATIENT MEDICATIONS    albumin human 25% IVPB 50 milliLiter(s) IV Intermittent every 10 minutes  chlorhexidine 2% Cloths 1 Application(s) Topical <User Schedule>  cycloSPORINE  (SandIMMUNE) IVPB 18 milliGRAM(s) IV Intermittent every 24 hours  fat emulsion (Fish Oil and Plant Based) 20% Infusion 8.3 mL/Hr IV Continuous <Continuous>  furosemide Infusion 5 mG/Hr IV Continuous <Continuous>  heparin   Injectable 5000 Unit(s) SubCutaneous every 8 hours  hydrALAZINE 50 milliGRAM(s) Oral every 8 hours  insulin regular Infusion 1 Unit(s)/Hr IV Continuous <Continuous>  methylPREDNISolone sodium succinate Injectable 8 milliGRAM(s) IV Push <User Schedule>  pantoprazole  Injectable 40 milliGRAM(s) IV Push daily  Parenteral Nutrition - Adult 1 Each TPN Continuous <Continuous>  vancomycin    Solution 500 milliGRAM(s) Oral every 6 hours  vancomycin  Retention Enema 500 milliGRAM(s) Rectal every 6 hours      PRN INPATIENT MEDICATION  benzocaine 15 mG/menthol 3.6 mG (Sugar-Free) Lozenge 1 Lozenge Oral every 6 hours PRN        VITALS/PHYSICAL EXAM  --------------------------------------------------------------------------------  T(C): 36.5 (09-09-20 @ 08:00), Max: 37.1 (09-09-20 @ 00:00)  HR: 125 (09-09-20 @ 08:00) (114 - 133)  BP: --  RR: 32 (09-09-20 @ 08:00) (11 - 42)  SpO2: 100% (09-09-20 @ 08:00) (94% - 100%)  Wt(kg): --        09-08-20 @ 07:01  -  09-09-20 @ 07:00  --------------------------------------------------------  IN: 3552.5 mL / OUT: 6480 mL / NET: -2927.5 mL    09-09-20 @ 07:01  -  09-09-20 @ 10:19  --------------------------------------------------------  IN: 139.5 mL / OUT: 200 mL / NET: -60.5 mL    PHYSICAL EXAM:  GEN:  guarded but stable, WD/WN/WG, (+)KO TF  HEENT: NC/AT, PERRL, mucosa moist & throat clear, no trachea midline w/ neck supple  GI: (+) BS, softly distended, nontender   MSK:  FROM x4, no deformities  VASCULAR: Equally warm, no cyanosis, clubbing,        Lt IJ TLC &  LUE PICC w/ dressing c/d/i,         BLE edema equal,  R>LUE edema- soft nontender w/o cord or erythema  Neurology; no focal deficits, weakened strength & sensation grossly intact  Psych: normal affect  Skin: warm,  moist, & w/ good turgor        LABS/ CULTURES/ RADIOLOGY:              7.2    17.81 >-----------<  123      [09-09-20 @ 00:29]              22.8     137  |  100  |  98  ----------------------------<  111      [09-09-20 @ 00:29]  4.4   |  25  |  1.34        Ca     8.3     [09-09-20 @ 00:29]      Mg     1.8     [09-09-20 @ 00:29]      Phos  4.5     [09-09-20 @ 00:29]    TPro  6.5  /  Alb  2.6  /  TBili  0.9  /  DBili  x   /  AST  72  /  ALT  45  /  AlkPhos  210  [09-09-20 @ 00:29]          [09-08-20 @ 13:49]    Blood Gas Arterial - Calcium, Ionized: 1.16 mmoL/L (09-09-20 @ 00:27)  Blood Gas Arterial - Calcium, Ionized: 1.16 mmoL/L (09-08-20 @ 15:28)      CAPILLARY BLOOD GLUCOSE  POCT Blood Glucose.: 220 mg/dL (09 Sep 2020 10:12)  POCT Blood Glucose.: 139 mg/dL (09 Sep 2020 08:59)  POCT Blood Glucose.: 110 mg/dL (09 Sep 2020 07:48)  POCT Blood Glucose.: 111 mg/dL (09 Sep 2020 07:33)  POCT Blood Glucose.: 113 mg/dL (09 Sep 2020 05:58)  POCT Blood Glucose.: 122 mg/dL (09 Sep 2020 05:16)  POCT Blood Glucose.: 128 mg/dL (09 Sep 2020 04:09)  POCT Blood Glucose.: 116 mg/dL (09 Sep 2020 03:02)  POCT Blood Glucose.: 115 mg/dL (09 Sep 2020 02:10)  POCT Blood Glucose.: 115 mg/dL (08 Sep 2020 23:03)  POCT Blood Glucose.: 130 mg/dL (08 Sep 2020 21:10)  POCT Blood Glucose.: 135 mg/dL (08 Sep 2020 20:15)  POCT Blood Glucose.: 136 mg/dL (08 Sep 2020 18:55)  POCT Blood Glucose.: 134 mg/dL (08 Sep 2020 18:08)  POCT Blood Glucose.: 139 mg/dL (08 Sep 2020 17:07)  POCT Blood Glucose.: 148 mg/dL (08 Sep 2020 16:02)  POCT Blood Glucose.: 156 mg/dL (08 Sep 2020 15:15)  POCT Blood Glucose.: 151 mg/dL (08 Sep 2020 13:45)  POCT Blood Glucose.: 156 mg/dL (08 Sep 2020 13:14)  POCT Blood Glucose.: 147 mg/dL (08 Sep 2020 12:00)    Prealbumin, Serum: 22 mg/dL (09-08-20 @ 16:22)  Prealbumin, Serum: 10 mg/dL (09-02-20 @ 08:12)  Prealbumin, Serum: 24 mg/dL (08-25-20 @ 15:10)      Triglycerides, Serum: 289 mg/dL (09-09-20 @ 00:29)  Triglycerides, Serum: 326 mg/dL (09-08-20 @ 11:05)  Triglycerides, Serum: 285 mg/dL (09-08-20 @ 01:52)  Triglycerides, Serum: 573 mg/dL (09-07-20 @ 00:40)  Triglycerides, Serum: 182 mg/dL (09-06-20 @ 06:34)    Culture - Blood (collected 09-08-20 @ 04:41)  Source: .Blood Blood-Peripheral  Preliminary Report (09-09-20 @ 05:01):    No growth to date. [facility] NUTRITION SUPPORT / TPN -- FOLLOW UP NOTE  --------------------------------------------------------------------------------    24 hour events/subjective:    tolerated bronch yesterday  had some liquids in am, but refusing later in the day  no abdominal pain, (+) feels bloated  tolerating trickle TF w/o N/ V- held while pt was put on Bipap  No flatus/ BM   no f/c/s  no dyspnea/ sob/ palp/ cp        Diet:  Diet, Clear Liquid:   Consistent Carbohydrate No Snacks (CSTCHO)  Low Sodium  Tube Feeding Modality: Nasogastric  Glucerna 1.5 Sohail (GLUCERNA1.5RTH)  Total Volume for 24 Hours (mL): 240  Continuous  Starting Tube Feed Rate mL per Hour: 10  Until Goal Tube Feed Rate (mL per Hour): 10  Tube Feed Duration (in Hours): 24  Tube Feed Start Time: 11:10 (09-08-20 @ 13:35)      Appetite: [ x ]Poor [  ]Adequate [  ]Good  Caloric intake:  [   ]  Adequate   [ x  ] Inadequate    ROS: General/ GI see HPI  all other systems negative      ALLERGIES & MEDICATIONS  --------------------------------------------------------------------------------  No Known Allergies    STANDING INPATIENT MEDICATIONS    albumin human 25% IVPB 50 milliLiter(s) IV Intermittent every 10 minutes  chlorhexidine 2% Cloths 1 Application(s) Topical <User Schedule>  cycloSPORINE  (SandIMMUNE) IVPB 18 milliGRAM(s) IV Intermittent every 24 hours  fat emulsion (Fish Oil and Plant Based) 20% Infusion 8.3 mL/Hr IV Continuous <Continuous>  furosemide Infusion 5 mG/Hr IV Continuous <Continuous>  heparin   Injectable 5000 Unit(s) SubCutaneous every 8 hours  hydrALAZINE 50 milliGRAM(s) Oral every 8 hours  insulin regular Infusion 1 Unit(s)/Hr IV Continuous <Continuous>  methylPREDNISolone sodium succinate Injectable 8 milliGRAM(s) IV Push <User Schedule>  pantoprazole  Injectable 40 milliGRAM(s) IV Push daily  Parenteral Nutrition - Adult 1 Each TPN Continuous <Continuous>  vancomycin    Solution 500 milliGRAM(s) Oral every 6 hours  vancomycin  Retention Enema 500 milliGRAM(s) Rectal every 6 hours      PRN INPATIENT MEDICATION  benzocaine 15 mG/menthol 3.6 mG (Sugar-Free) Lozenge 1 Lozenge Oral every 6 hours PRN        VITALS/PHYSICAL EXAM  --------------------------------------------------------------------------------  T(C): 36.5 (09-09-20 @ 08:00), Max: 37.1 (09-09-20 @ 00:00)  HR: 125 (09-09-20 @ 08:00) (114 - 133)  BP: --  RR: 32 (09-09-20 @ 08:00) (11 - 42)  SpO2: 100% (09-09-20 @ 08:00) (94% - 100%)  Wt(kg): --        09-08-20 @ 07:01  -  09-09-20 @ 07:00  --------------------------------------------------------  IN: 3552.5 mL / OUT: 6480 mL / NET: -2927.5 mL    09-09-20 @ 07:01  -  09-09-20 @ 10:19  --------------------------------------------------------  IN: 139.5 mL / OUT: 200 mL / NET: -60.5 mL    PHYSICAL EXAM:  GEN:  guarded but stable, WD/WN/WG, (+)KO TF  HEENT: NC/AT, PERRL, mucosa moist & throat clear, no trachea midline w/ neck supple  GI: (+) BS, softly distended, nontender   MSK:  FROM x4, no deformities  VASCULAR: Equally warm, no cyanosis, clubbing,        Lt IJ TLC &  LUE PICC w/ dressing c/d/i,         BLE edema equal,  R>LUE edema- soft nontender w/o cord or erythema  Neurology; no focal deficits, weakened strength & sensation grossly intact  Psych: normal affect  Skin: warm,  moist, & w/ good turgor        LABS/ CULTURES/ RADIOLOGY:              7.2    17.81 >-----------<  123      [09-09-20 @ 00:29]              22.8     137  |  100  |  98  ----------------------------<  111      [09-09-20 @ 00:29]  4.4   |  25  |  1.34        Ca     8.3     [09-09-20 @ 00:29]      Mg     1.8     [09-09-20 @ 00:29]      Phos  4.5     [09-09-20 @ 00:29]    TPro  6.5  /  Alb  2.6  /  TBili  0.9  /  DBili  x   /  AST  72  /  ALT  45  /  AlkPhos  210  [09-09-20 @ 00:29]          [09-08-20 @ 13:49]    Blood Gas Arterial - Calcium, Ionized: 1.16 mmoL/L (09-09-20 @ 00:27)  Blood Gas Arterial - Calcium, Ionized: 1.16 mmoL/L (09-08-20 @ 15:28)      CAPILLARY BLOOD GLUCOSE  POCT Blood Glucose.: 220 mg/dL (09 Sep 2020 10:12)  POCT Blood Glucose.: 139 mg/dL (09 Sep 2020 08:59)  POCT Blood Glucose.: 110 mg/dL (09 Sep 2020 07:48)  POCT Blood Glucose.: 111 mg/dL (09 Sep 2020 07:33)  POCT Blood Glucose.: 113 mg/dL (09 Sep 2020 05:58)  POCT Blood Glucose.: 122 mg/dL (09 Sep 2020 05:16)  POCT Blood Glucose.: 128 mg/dL (09 Sep 2020 04:09)  POCT Blood Glucose.: 116 mg/dL (09 Sep 2020 03:02)  POCT Blood Glucose.: 115 mg/dL (09 Sep 2020 02:10)  POCT Blood Glucose.: 115 mg/dL (08 Sep 2020 23:03)  POCT Blood Glucose.: 130 mg/dL (08 Sep 2020 21:10)  POCT Blood Glucose.: 135 mg/dL (08 Sep 2020 20:15)  POCT Blood Glucose.: 136 mg/dL (08 Sep 2020 18:55)  POCT Blood Glucose.: 134 mg/dL (08 Sep 2020 18:08)  POCT Blood Glucose.: 139 mg/dL (08 Sep 2020 17:07)  POCT Blood Glucose.: 148 mg/dL (08 Sep 2020 16:02)  POCT Blood Glucose.: 156 mg/dL (08 Sep 2020 15:15)  POCT Blood Glucose.: 151 mg/dL (08 Sep 2020 13:45)  POCT Blood Glucose.: 156 mg/dL (08 Sep 2020 13:14)  POCT Blood Glucose.: 147 mg/dL (08 Sep 2020 12:00)    Prealbumin, Serum: 22 mg/dL (09-08-20 @ 16:22)  Prealbumin, Serum: 10 mg/dL (09-02-20 @ 08:12)  Prealbumin, Serum: 24 mg/dL (08-25-20 @ 15:10)      Triglycerides, Serum: 289 mg/dL (09-09-20 @ 00:29)  Triglycerides, Serum: 326 mg/dL (09-08-20 @ 11:05)  Triglycerides, Serum: 285 mg/dL (09-08-20 @ 01:52)  Triglycerides, Serum: 573 mg/dL (09-07-20 @ 00:40)  Triglycerides, Serum: 182 mg/dL (09-06-20 @ 06:34)    Culture - Blood (collected 09-08-20 @ 04:41)  Source: .Blood Blood-Peripheral  Preliminary Report (09-09-20 @ 05:01):    No growth to date.

## 2020-09-09 NOTE — PROGRESS NOTE ADULT - SUBJECTIVE AND OBJECTIVE BOX
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 Patient was treated w/ prednisone and Rituximab. Tacro was discontinued and patient was also transitioned to everolimus  Admitted  6/16-6/19  for hyperglycemia.  Reported to transplant team having one done black tarry stool-  instructed to  present to [facility] for hemolytic anemia. Patient has been following with Hematology outpatient.  Denies CP  N/V diarrhea SOB. (11 Aug 2020 20:08)      Surgery/Hospital Course:  8/11 Admitted to [facility] with symptomatic anemia  8/13 EGD for investigation of tarry stools  8/15 SB capsule: stomach & SB lesions, likely ulcers.  8/17 EGD/push enteroscopy w/ angioectasias/erosions in small bowel. Gastropathy and esophagitis. Ulcer seen on VCE most likely scope trauma.    8/18 VSS transferred back to floor.   8/20 VS 9.0/28.4 stable Gastric biopsy results LA Grade A esophagitis.  - Acute gastritis. Biopsies taken to r/o CMV, No ulceration seen despite finding on capsule endoscopy - likely 2/2 scope  trauma that has since resolved. Pathology pending.  8/21 VSS H/H  8.1/25.7  trending down will monitor prednisone taper 30 mg today. Procardia 120 initiated today RHC biopsy Monday.   8/22 VVS; Patient reports loose stools x 2-3 days with 1 episode today.  Stool sent for C-dif and GI PCR. Abdominal X-ray revealed: dilated loops of bowel. Abdomen distended.  Patient denies N/V. GI called to re-evaluate. Continue with current medication regimen.  Awaiting for upcoming cardiac biopsy on Monday 8/24.  8/23   vss    creat up to 2.28 ,  repeating CMP,  TTE ordered  Bladder scan   8/24   cardiac bx cancelled   elev creat  to 2.74   cardio renal cslt called,    + CDIF   inc vanco to 500 q6   ID following  8/25 VSS: RHC today as per transplant team; transfuse 2 units PRBC today as per Dr. Viramontes; continue vanco for cdiff; transfer patient to CTU after cath today   8/26. Diuretic challenge with good response   8/27: Downgraded to the floor then upgraded to the CTU again for concern of abd distention   8/28 CT ABD & Pelvis reveals pancolitis  8/30: repeat CT scan of abd, gen surgery called to re-evaluated pt.  8/31: RUE duplex negative for DVT  9/4 NGT clamped, minimal residual. NGT removed. started sips   9/6 tolerating clear liquid diet    Today:    REVIEW OF SYSTEMS:  Gen: No fever  EYES/ENT: No visual changes;  No vertigo or throat pain   NECK: No pain   RES:  No shortness of breath or Cough  CARD: No chest pain   GI: No abdominal pain  : No dysuria  NEURO: No weakness  SKIN: No itching, rashes     ICU Vital Signs Last 24 Hrs  T(C): 36.5 (09 Sep 2020 08:00), Max: 37.1 (09 Sep 2020 00:00)  T(F): 97.7 (09 Sep 2020 08:00), Max: 98.8 (09 Sep 2020 00:00)  HR: 125 (09 Sep 2020 08:00) (114 - 133)  BP: --  BP(mean): --  ABP: 117/59 (09 Sep 2020 08:00) (112/52 - 145/74)  ABP(mean): 77 (09 Sep 2020 08:00) (71 - 95)  RR: 32 (09 Sep 2020 08:00) (11 - 42)  SpO2: 100% (09 Sep 2020 08:00) (94% - 100%)      Physical Exam:  Gen:  Awake, alert, no acute distress  CNS: non focal 	  Neck: no JVD  RES :  dec BS b/l, crackles at bases, no wheezing              CVS: RRR Normal S1/S2, +2 edema b/l LE  Abd: distended but soft, non-tender, no rebound tenderness. Bowel sounds present  Ext: +4 pitting edema in RUE, b/l LE's. +2 pitting edema in LUE.  Skin: No rash.    ============================I/O===========================   I&O's Detail    08 Sep 2020 07:01  -  09 Sep 2020 07:00  --------------------------------------------------------  IN:    fat emulsion (Fish Oil and Plant Based) 20% Infusion: 91.3 mL    furosemide Infusion: 60 mL    Glucerna 1.5: 170 mL    insulin regular Infusion: 75.5 mL    Oral Fluid: 120 mL    Solution: 35.7 mL    TPN (Total Parenteral Nutrition): 3000 mL  Total IN: 3552.5 mL    OUT:    Indwelling Catheter - Urethral: 6480 mL  Total OUT: 6480 mL    Total NET: -2927.5 mL      09 Sep 2020 07:01  -  09 Sep 2020 08:15  --------------------------------------------------------  IN:    furosemide Infusion: 2.5 mL    Glucerna 1.5: 10 mL    insulin regular Infusion: 2 mL    TPN (Total Parenteral Nutrition): 125 mL  Total IN: 139.5 mL    OUT:    Indwelling Catheter - Urethral: 200 mL  Total OUT: 200 mL    Total NET: -60.5 mL        ============================ LABS =========================                        7.2    17.81 )-----------( 123      ( 09 Sep 2020 00:29 )             22.8     09-09    137  |  100  |  98<H>  ----------------------------<  111<H>  4.4   |  25  |  1.34<H>    Ca    8.3<L>      09 Sep 2020 00:29  Phos  4.5     09-09  Mg     1.8     09-09    TPro  6.5  /  Alb  2.6<L>  /  TBili  0.9  /  DBili  x   /  AST  72<H>  /  ALT  45  /  AlkPhos  210<H>  09-09    LIVER FUNCTIONS - ( 09 Sep 2020 00:29 )  Alb: 2.6 g/dL / Pro: 6.5 g/dL / ALK PHOS: 210 U/L / ALT: 45 U/L / AST: 72 U/L / GGT: x             ABG - ( 09 Sep 2020 00:27 )  pH, Arterial: 7.36  pH, Blood: x     /  pCO2: 50    /  pO2: 138   / HCO3: 27    / Base Excess: 2.1   /  SaO2: 99                  ======================Micro/Rad/Cardio=================  Culture: Reviewed   CXR: Reviewed  Echo:Reviewed  ======================================================  PAST MEDICAL & SURGICAL HISTORY:  H/O hemolytic anemia  H/O autoimmune hemolytic anemia  Knee pain, right  HLD (hyperlipidemia)  Former smoker  DVT of upper extremity (deep vein thrombosis)  Hepatitis C virus  GIB (gastrointestinal bleeding)  Ventricular fibrillation: s/p AICD  PAF (paroxysmal atrial fibrillation): on xarelto  Non-Ischemic Cardiomyopathy  SVT (Supraventricular Tachycardia)  HTN  CHF (Congestive Heart Failure)  S/P right heart catheterization: biopsy multiple  H/O heart transplant: 2/2018  Status post left hip replacement  History of Prior Ablation Treatment: for afib  AICD (Automatic Cardioverter/Defibrillator) Present: [employer] with 1 [employer] lead4/1/09- explanted and replaced with Medtronic 2 leads on 9/2/09    ====================ASSESSMENT ==============  Heart transplant 2/2018 for ACC/AHA stage D NICM   Resolved GI Bleed, Melena s/p EGD  Acute respiratory failure, resolved  Supraventricular tachycardia  Severe hypertension  Hyperlactatemia acidosis, resolved  Leukopenia- resolved  Acute blood loss anemia, stable   CKD Stage III  C. diff diarrhea  Klebsiella oxytoca bacteremia  Sepsis  Azotemia 2/2 CKD and Steroids   Pancolitis    Plan:  ====================== NEUROLOGY=====================  Off sedation, continue to monitor neuro status     ==================== RESPIRATORY======================  Patient persistently has RLL collapse on CXR, supplemental O2 via NC with intermittent BiPAP  Encourage incentive spirometry, continue pulse ox monitoring, follow ABGs.   Encourage chest PT, PT, OOB to chair    ====================CARDIOVASCULAR==================  Transplant team following.   Heart transplant 2/2018 for ACC/AHA stage D NICM, now on solumedrol from prednisone. Cyclosporine infusion on for immunosuppression.  (NPO for abd distention) off everolimus. Off cellcept while on high dose steroids.   Continue hemodynamic monitoring.   ASA on hold for hx of GI bleed / ulceration.   hydralazine for MAP 70-80    hydrALAZINE 50 milliGRAM(s) Oral every 8 hours  methylPREDNISolone sodium succinate Injectable 8 milliGRAM(s) IV Push <User Schedule>    ===================HEMATOLOGIC/ONC ===================  Anemia s/p multiple blood transfusions. Continue closely monitoring H&H and transfuse prn.   GI Bleeding ruled out s/p EGD, found to have ulcers. F/u heme recs for hx of autoimmune hemolytic anemia.      VTE prophylaxis, heparin   Injectable 5000 Unit(s) SubCutaneous every 8 hours    ===================== RENAL =========================  NORMAN on CKD stage 3, Anasarca. Diuresis with Lasix gtt   Continue monitoring urine output, BUN/Creatinine, I/Os and replete electrolytes prn.     furosemide Infusion 5 mG/Hr (2.5 mL/Hr) IV Continuous <Continuous>    ==================== GASTROINTESTINAL===================  Nutrition following, continue clear liquid diet   Ileus present in setting of Cdiff.- NGT clamped 9/4 for 4 hrs, no residual. NGT removed, stared on sips, c/w TPN for now.  CT A/P on 8/30 with Pancolitis and small volume abdominopelvic ascites, not significantly changed since 8/20/2020.  General surgery following-continue serial abdominal exams. No surgical intervention at this time.     albumin human 25% IVPB 50 milliLiter(s) IV Intermittent every 10 minutes  fat emulsion (Fish Oil and Plant Based) 20% Infusion 8.3 mL/Hr (8.3 mL/Hr) IV Continuous <Continuous>  GI prophylaxis, pantoprazole  Injectable 40 milliGRAM(s) IV Push daily  Parenteral Nutrition - Adult 1 Each (125 mL/Hr) TPN Continuous <Continuous>    =======================    ENDOCRINE  =====================  Glucose control with insulin drip for stress hyperglycemia     insulin regular Infusion 1 Unit(s)/Hr (1 mL/Hr) IV Continuous <Continuous>    ========================INFECTIOUS DISEASE================  Klebsiella bacteremia from 8/13 which has since cleared  Clostridium difficile PCR positive, continue with vancomycin PO and Rectal Vanco   Transplant prophylaxis with Posaconazole and Atovaquone on hold due to ileus   Dr. Lujan following, no need for additional abx at this time. Monitor for now.     vancomycin    Solution 500 milliGRAM(s) Oral every 6 hours  vancomycin  Retention Enema 500 milliGRAM(s) Rectal every 6 hours      Patient requires continuous monitoring with bedside rhythm monitoring, pulse ox monitoring, and intermittent blood gas analysis. Care plan discussed with ICU care team. Patient remained critical and at risk for life threatening decompensation.     By signing my name below, I, Ronit Joe, attest that this documentation has been prepared under the direction and in the presence of SHELLY Diez   Electronically signed: Katty Gaffney, 09-09-20 @ 08:15    I, Silvano August, personally performed the services described in this documentation. all medical record entries made by the ramuibe were at my direction and in my presence. 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 Patient was treated w/ prednisone and Rituximab. Tacro was discontinued and patient was also transitioned to everolimus  Admitted  6/16-6/19  for hyperglycemia.  Reported to transplant team having one done black tarry stool-  instructed to  present to [facility] for hemolytic anemia. Patient has been following with Hematology outpatient.  Denies CP  N/V diarrhea SOB. (11 Aug 2020 20:08)    Surgery/Hospital Course:  8/11 Admitted to [facility] with symptomatic anemia  8/13 EGD for investigation of tarry stools  8/15 SB capsule: stomach & SB lesions, likely ulcers.  8/17 EGD/push enteroscopy w/ angioectasias/erosions in small bowel. Gastropathy and esophagitis. Ulcer seen on VCE most likely scope trauma.    8/18 VSS transferred back to floor.   8/20 VS 9.0/28.4 stable Gastric biopsy results LA Grade A esophagitis.  - Acute gastritis. Biopsies taken to r/o CMV, No ulceration seen despite finding on capsule endoscopy - likely 2/2 scope  trauma that has since resolved. Pathology pending.  8/21 VSS H/H  8.1/25.7  trending down will monitor prednisone taper 30 mg today. Procardia 120 initiated today RHC biopsy Monday.   8/22 VVS; Patient reports loose stools x 2-3 days with 1 episode today.  Stool sent for C-dif and GI PCR. Abdominal X-ray revealed: dilated loops of bowel. Abdomen distended.  Patient denies N/V. GI called to re-evaluate. Continue with current medication regimen.  Awaiting for upcoming cardiac biopsy on Monday 8/24.  8/23   vss    creat up to 2.28 ,  repeating CMP,  TTE ordered  Bladder scan   8/24   cardiac bx cancelled   elev creat  to 2.74   cardio renal cslt called,    + CDIF   inc vanco to 500 q6   ID following  8/25 VSS: RHC today as per transplant team; transfuse 2 units PRBC today as per Dr. Viramontes; continue vanco for cdiff; transfer patient to CTU after cath today   8/26. Diuretic challenge with good response   8/27: Downgraded to the floor then upgraded to the CTU again for concern of abd distention   8/28 CT ABD & Pelvis reveals pancolitis  8/30: repeat CT scan of abd, gen surgery called to re-evaluated pt.  8/31: RUE duplex negative for DVT  9/4 NGT clamped, minimal residual. NGT removed. started sips   9/6 tolerating clear liquid diet  9/8 Bronch    Today: persistent right lower lobe collapse    REVIEW OF SYSTEMS:  Gen: No fever  EYES/ENT: No visual changes;  No vertigo or throat pain   NECK: No pain   RES:  No shortness of breath or Cough  CARD: No chest pain   GI: No abdominal pain  : No dysuria  NEURO: No weakness  SKIN: No itching, rashes     ICU Vital Signs Last 24 Hrs  T(C): 36.5 (09 Sep 2020 08:00), Max: 37.1 (09 Sep 2020 00:00)  T(F): 97.7 (09 Sep 2020 08:00), Max: 98.8 (09 Sep 2020 00:00)  HR: 125 (09 Sep 2020 08:00) (114 - 133)  BP(mean): 73  ABP: 117/59 (09 Sep 2020 08:00) (112/52 - 145/74)  ABP(mean): 77 (09 Sep 2020 08:00) (71 - 95)  RR: 32 (09 Sep 2020 08:00) (11 - 42)  SpO2: 100% (09 Sep 2020 08:00) (94% - 100%)    Physical Exam:  Gen:  Awake, alert, no acute distress  CNS: non focal 	  Neck: no JVD  RES :  dec BS b/l, crackles at bases, no wheezing              CVS: RRR Normal S1/S2, +2 edema b/l LE  Abd: distended but soft, non-tender, no rebound tenderness. Bowel sounds present  Ext: +4 pitting edema in RUE, b/l LE's. +2 pitting edema in LUE.  Skin: No rash.    ============================I/O===========================   I&O's Detail    08 Sep 2020 07:01  -  09 Sep 2020 07:00  --------------------------------------------------------  IN:    fat emulsion (Fish Oil and Plant Based) 20% Infusion: 91.3 mL    furosemide Infusion: 60 mL    Glucerna 1.5: 170 mL    insulin regular Infusion: 75.5 mL    Oral Fluid: 120 mL    Solution: 35.7 mL    TPN (Total Parenteral Nutrition): 3000 mL  Total IN: 3552.5 mL    OUT:    Indwelling Catheter - Urethral: 6480 mL  Total OUT: 6480 mL    Total NET: -2927.5 mL    09 Sep 2020 07:01  -  09 Sep 2020 08:15  --------------------------------------------------------  IN:    furosemide Infusion: 2.5 mL    Glucerna 1.5: 10 mL    insulin regular Infusion: 2 mL    TPN (Total Parenteral Nutrition): 125 mL  Total IN: 139.5 mL    OUT:    Indwelling Catheter - Urethral: 200 mL  Total OUT: 200 mL    Total NET: -60.5 mL    ============================ LABS =========================                        7.2    17.81 )-----------( 123      ( 09 Sep 2020 00:29 )             22.8     137  |  100  |  98<H>  ----------------------------<  111<H>  4.4   |  25  |  1.34<H>    Ca    8.3<L>      09 Sep 2020 00:29  Phos  4.5     09-09  Mg     1.8     09-09    TPro  6.5  /  Alb  2.6<L>  /  TBili  0.9  /  DBili  x   /  AST  72<H>  /  ALT  45  /  AlkPhos  210<H>  09-09    LIVER FUNCTIONS - ( 09 Sep 2020 00:29 )  Alb: 2.6 g/dL / Pro: 6.5 g/dL / ALK PHOS: 210 U/L / ALT: 45 U/L / AST: 72 U/L / GGT: x           ABG - ( 09 Sep 2020 00:27 )  pH, Arterial: 7.36  pH, Blood: x     /  pCO2: 50    /  pO2: 138   / HCO3: 27    / Base Excess: 2.1   /  SaO2: 99        ======================Micro/Rad/Cardio=================  Culture: Reviewed   CXR: Reviewed  Echo: Reviewed    ======================================================  PAST MEDICAL & SURGICAL HISTORY:  H/O hemolytic anemia  H/O autoimmune hemolytic anemia  Knee pain, right  HLD (hyperlipidemia)  Former smoker  DVT of upper extremity (deep vein thrombosis)  Hepatitis C virus  GIB (gastrointestinal bleeding)  Ventricular fibrillation: s/p AICD  PAF (paroxysmal atrial fibrillation): on xarelto  Non-Ischemic Cardiomyopathy  SVT (Supraventricular Tachycardia)  HTN  CHF (Congestive Heart Failure)  S/P right heart catheterization: biopsy multiple  H/O heart transplant: 2/2018  Status post left hip replacement  History of Prior Ablation Treatment: for afib  AICD (Automatic Cardioverter/Defibrillator) Present: [employer] with 1 [employer] lead4/1/09- explanted and replaced with Medtronic 2 leads on 9/2/09    ====================ASSESSMENT ==============  Heart transplant 2/2018 for ACC/AHA stage D NICM   Resolved GI Bleed, Melena s/p EGD  Acute respiratory failure, resolved  Supraventricular tachycardia  Severe hypertension  Hyperlactatemia acidosis, resolved  Leukopenia- resolved  Acute blood loss anemia, stable   CKD Stage III  C. diff diarrhea  Klebsiella oxytoca bacteremia  Sepsis  Azotemia 2/2 CKD and Steroids   Pancolitis    Plan:  ====================== NEUROLOGY=====================  Off sedation, continue to monitor neuro status    ==================== RESPIRATORY======================  Patient persistently has RLL collapse on CXR, bronch'ed yesterday without improvement in CXR. Supplemental O2 via NC with intermittent BiPAP  Encourage incentive spirometry, continue pulse ox monitoring, follow ABGs.   Encourage chest PT, PT, OOB to chair    ====================CARDIOVASCULAR==================  Transplant team following.   Heart transplant 2/2018 for ACC/AHA stage D NICM, now on solumedrol from prednisone. Cyclosporine infusion on for immunosuppression.  (NPO for abd distention) off everolimus. Off cellcept while on high dose steroids.   Continue hemodynamic monitoring.   ASA on hold for hx of GI bleed / ulceration.   hydralazine for MAP 70-80    hydrALAZINE 50 milliGRAM(s) Oral every 8 hours  methylPREDNISolone sodium succinate Injectable 8 milliGRAM(s) IV Push <User Schedule>    ===================HEMATOLOGIC/ONC ===================  Anemia s/p multiple blood transfusions. Continue closely monitoring H&H and transfuse prn.   GI Bleeding ruled out s/p EGD, found to have ulcers. F/u heme recs for hx of autoimmune hemolytic anemia.      VTE prophylaxis, heparin   Injectable 5000 Unit(s) SubCutaneous every 8 hours    ===================== RENAL =========================  NORMAN on CKD stage 3, Anasarca. Diuresis with Lasix gtt   Continue monitoring urine output, BUN/Creatinine, I/Os and replete electrolytes prn.     furosemide Infusion 5 mG/Hr (2.5 mL/Hr) IV Continuous <Continuous>    ==================== GASTROINTESTINAL===================  Nutrition following, continue clear liquid diet   Ileus present in setting of Cdiff.- NGT clamped 9/4 for 4 hrs, no residual. NGT removed, stared on sips, c/w TPN for now.  CT A/P on 8/30 with Pancolitis and small volume abdominopelvic ascites, not significantly changed since 8/20/2020.  General surgery following-continue serial abdominal exams. No surgical intervention at this time.     albumin human 25% IVPB 50 milliLiter(s) IV Intermittent every 10 minutes  fat emulsion (Fish Oil and Plant Based) 20% Infusion 8.3 mL/Hr (8.3 mL/Hr) IV Continuous <Continuous>  GI prophylaxis, pantoprazole  Injectable 40 milliGRAM(s) IV Push daily  Parenteral Nutrition - Adult 1 Each (125 mL/Hr) TPN Continuous <Continuous>    =======================    ENDOCRINE  =====================  Glucose control with insulin drip for stress hyperglycemia     insulin regular Infusion 1 Unit(s)/Hr (1 mL/Hr) IV Continuous <Continuous>    ========================INFECTIOUS DISEASE================  Klebsiella bacteremia from 8/13 which has since cleared  Clostridium difficile PCR positive, continue with vancomycin PO and Rectal Vanco   Transplant prophylaxis with Posaconazole and Atovaquone on hold due to ileus   Dr. Lujan following, no need for additional abx at this time. Monitor for now.     vancomycin    Solution 500 milliGRAM(s) Oral every 6 hours  vancomycin  Retention Enema 500 milliGRAM(s) Rectal every 6 hours    I have spent 45 minutes of noncontinuous critical care time with this patient.    Patient requires continuous monitoring with bedside rhythm monitoring, pulse ox monitoring, and intermittent blood gas analysis. Care plan discussed with ICU care team. Patient remained critical and at risk for life threatening decompensation.     By signing my name below, I, Ronit Joe, attest that this documentation has been prepared under the direction and in the presence of SHELLY Diez   Electronically signed: Katty Gaffney, 09-09-20 @ 08:15    I, Silvano August, personally performed the services described in this documentation. all medical record entries made by the ramuibe were at my direction and in my presence. I have reviewed the chart and agree that the record reflects my personal performance and is accurate and complete  Electronically signed: SHELLY Diez

## 2020-09-09 NOTE — PROGRESS NOTE ADULT - ASSESSMENT
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 s/p EGD with esophagitis and gastritis. Developed Klebsiella oxytoca bacteremia requiring transfer to CTU. Clinically improving, transferred to [facility], finished 10-day of ceftriaxone, however, developed severe C.diff colitis on Vancomycin 500mg q6h PO and IV Flagyl. He had RHC on 8/25 and found to have high filling pressure so transferred to CTU again.    #severe C.diff colitis with NORMAN- C.diff PCR detected (8/23). Repeat CT on 8/30 without evidence of toxic megacolon.   - Leukocytosis persisting  Suspect is immune system recovering from immunosuppression and now reacting to Cdiff infection  - cont vancomycin 500 mg per rectum every 6 hours 8/30-->  - cont PO Vanco 500 q 6h  8/23-->  - discontinued IV flagyl (8/24-9/1)  - s/p  ERAVAYCLINE 1 mg/kg (750 mg)  every 12 hours for total 7 days (8/31- 9/6)  - completed IVIG x5 days (8/29-9/1)  - fecal transplant not available    #leukocytosis - 2/2 ongoing c diff vs other infection.  modestly decreased  - Followup on 9/4 blood culture Negative 9/8 Blood culture No Growth to date  - would start abx only for positive blood cultures as abx can worsen current cdiff infection     hemolysis noted    #Klebsiella oxytoca bacteremia: resolved   Meropenem (8/14-8/17)  Cefepime (8/17-20)  Ceftriaxone (8/20-)  - Growing in blood cultures 2/2 sets on 8/13  - Repeat blood cultures x2 sets 8/14 no growth final  - s/p ceftriaxone 8/14-24   - blood cultures 8/25 NG  - Followup on 9/4 blood culture Negative    #OI prophylaxis-  -Was previously receiving high dose steroids  -On IV cyclosporine now  -CMV viral load(8/17, 8/26): neg  -Valcyte and Bactrim d/c'ed on 8/17 due to leukopenia  -Galactomann<0.5, Fungitell<31  -Per hemoc, will taper prednisone every 7 days by 10mg  -Will consider bactrim for PCP ppx when able to tolerated PO    #Anemia- r/o GI bleeding  s/p EGD  EGD 8/17: esophagitis, acute gastritis s/p biopsy, no ulceration  On PPI 40mg daily  - cont to monitor cbc    discussed with CTU PA  discussed with cardiac transplant: unless urgent, defer high dose steroids additional week to avoid Cdiff exacerbation

## 2020-09-09 NOTE — PROGRESS NOTE ADULT - ASSESSMENT
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 (though per review cold antibiody is historical and active issue in the warm IgG antibody)  -etiology unclear as infectious and prior malignancy work up negative including BMBx 5/27; CT C/A/P negative for LAD on 8/28  -8/10 labs outpatient showing LDH elevated but stable, Tbili 0.7, retic mildly increased to 4.6%   -Repeat haptoglobin on 9/8 low; raising concern for worsening hemolysis with steroid taper to lower dose  -Please obtain: repeat LDH, haptoglobin, retic, indirect bili for tomorrow  -Will review peripheral smear as well  -Please start patient on 1mg/kg prednisone= 75mg prednisone for the hemolysis  -continue folic acid supplementation    #Klebsiella Bacteremia   -completed abx on 8/24     #Cdiff  #Ileus  -Vanc PO and IV flagyl per ID   -abdomen distended, XR indicating Ileus now with NG tube  -plan per primary/surgery     #Heart transplant recipient  -Repeat heart biopsy per transplant team      Lyndsay Valentin, PGY-4  Hematology-Oncology Fellow  [phone] ([place]) [phone] ([facility])  Please page fellow on call after 5pm and on weekends

## 2020-09-09 NOTE — PROGRESS NOTE ADULT - SUBJECTIVE AND OBJECTIVE BOX
INTERVAL HPI/OVERNIGHT EVENTS:  No overnight events. Assessed at bedside. Patient asleep.     MEDICATIONS  (STANDING):  chlorhexidine 2% Cloths 1 Application(s) Topical <User Schedule>  cycloSPORINE  (SandIMMUNE) IVPB 15 milliGRAM(s) IV Intermittent every 24 hours  Eravacycline (Xerava) 75 milliGRAM(s),Sodium Chloride 0.9% 150 milliLiter(s) 75 milliGRAM(s) IV Intermittent every 12 hours  heparin   Injectable 5000 Unit(s) SubCutaneous every 8 hours  immune   globulin 10% (GAMMAGARD) IVPB 60 Gram(s) IV Intermittent every 24 hours  insulin lispro (HumaLOG) corrective regimen sliding scale   SubCutaneous every 8 hours  methylPREDNISolone sodium succinate Injectable 16 milliGRAM(s) IV Push <User Schedule>  metroNIDAZOLE  IVPB      metroNIDAZOLE  IVPB 500 milliGRAM(s) IV Intermittent every 8 hours  multiple electrolytes Injection Type 1 1000 milliLiter(s) (120 mL/Hr) IV Continuous <Continuous>  pantoprazole  Injectable 40 milliGRAM(s) IV Push two times a day  vancomycin    Solution 500 milliGRAM(s) Oral every 6 hours  vancomycin  Retention Enema 500 milliGRAM(s) Rectal every 6 hours    MEDICATIONS  (PRN):  benzocaine 15 mG/menthol 3.6 mG (Sugar-Free) Lozenge 1 Lozenge Oral every 6 hours PRN Sore Throat    Allergies    No Known Allergies    Intolerances          VITAL SIGNS:  T(F): 97.7 (09-01-20 @ 16:00)  HR: 110 (09-01-20 @ 16:00)  BP: 125/91 (09-01-20 @ 16:00)  RR: 11 (09-01-20 @ 16:00)  SpO2: 98% (09-01-20 @ 16:00)  Wt(kg): --    PHYSICAL EXAM:    Constitutional: NAD  Eyes: EOMI, sclera non-icteric  Neck: supple  Respiratory: CTAB, no wheezes or crackles   Cardiovascular: RRR. + anasarca  Gastrointestinal: distended, no bowel sounds, NG tube with gastric fluid draining  Extremities: no cyanosis, clubbing or edema. +anasarca  Neurological: awake and alert    LABS:                        8.4    8.68  )-----------( 121      ( 01 Sep 2020 02:11 )             26.2     09-01    143  |  109<H>  |  85<H>  ----------------------------<  136<H>  3.6   |  22  |  2.24<H>    Ca    7.7<L>      01 Sep 2020 02:11  Phos  3.9     09-01  Mg     3.3     09-01    TPro  6.8  /  Alb  2.3<L>  /  TBili  0.6  /  DBili  x   /  AST  33  /  ALT  21  /  AlkPhos  79  09-01          RADIOLOGY & ADDITIONAL TESTS:  Studies reviewed. INTERVAL HPI/OVERNIGHT EVENTS:  No overnight events. Assessed at bedside. Patient asleep.     MEDICATIONS  (STANDING):  chlorhexidine 2% Cloths 1 Application(s) Topical <User Schedule>  cycloSPORINE  (SandIMMUNE) IVPB 15 milliGRAM(s) IV Intermittent every 24 hours  Eravacycline (Xerava) 75 milliGRAM(s),Sodium Chloride 0.9% 150 milliLiter(s) 75 milliGRAM(s) IV Intermittent every 12 hours  heparin   Injectable 5000 Unit(s) SubCutaneous every 8 hours  immune   globulin 10% (GAMMAGARD) IVPB 60 Gram(s) IV Intermittent every 24 hours  insulin lispro (HumaLOG) corrective regimen sliding scale   SubCutaneous every 8 hours  methylPREDNISolone sodium succinate Injectable 16 milliGRAM(s) IV Push <User Schedule>  metroNIDAZOLE  IVPB      metroNIDAZOLE  IVPB 500 milliGRAM(s) IV Intermittent every 8 hours  multiple electrolytes Injection Type 1 1000 milliLiter(s) (120 mL/Hr) IV Continuous <Continuous>  pantoprazole  Injectable 40 milliGRAM(s) IV Push two times a day  vancomycin    Solution 500 milliGRAM(s) Oral every 6 hours  vancomycin  Retention Enema 500 milliGRAM(s) Rectal every 6 hours    MEDICATIONS  (PRN):  benzocaine 15 mG/menthol 3.6 mG (Sugar-Free) Lozenge 1 Lozenge Oral every 6 hours PRN Sore Throat    Allergies    No Known Allergies    Intolerances      Vital Signs Last 24 Hrs  T(C): 36.8 (09 Sep 2020 16:00), Max: 37.1 (09 Sep 2020 00:00)  T(F): 98.2 (09 Sep 2020 16:00), Max: 98.8 (09 Sep 2020 00:00)  HR: 122 (09 Sep 2020 16:00) (114 - 126)  BP: --  BP(mean): --  RR: 25 (09 Sep 2020 16:00) (11 - 42)  SpO2: 100% (09 Sep 2020 16:00) (84% - 100%)    PHYSICAL EXAM:  Constitutional: NAD  Eyes: EOMI, sclera non-icteric  Neck: supple  Respiratory: CTAB, no wheezes or crackles   Cardiovascular: RRR. + anasarca  Gastrointestinal: distended, no bowel sounds, NG tube with gastric fluid draining  Extremities: no cyanosis, clubbing or edema. +anasarca  Neurological: awake and alert    LABS:                                    7.2    17.81 )-----------( 123      ( 09 Sep 2020 00:29 )             22.8       09-09    137  |  100  |  98<H>  ----------------------------<  111<H>  4.4   |  25  |  1.34<H>    Ca    8.3<L>      09 Sep 2020 00:29  Phos  4.5     09-09  Mg     1.8     09-09    TPro  6.5  /  Alb  2.6<L>  /  TBili  0.9  /  DBili  x   /  AST  72<H>  /  ALT  45  /  AlkPhos  210<H>  09-09        RADIOLOGY & ADDITIONAL TESTS:  Studies reviewed.

## 2020-09-09 NOTE — PROGRESS NOTE ADULT - ATTENDING COMMENTS
Patient revisited for possible new onset of hemolysis. 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 Extensive w/u did not reveal any neoplastic process. He has received steroid, PLEX, IVIG and rituxan in the recent past to prevent graft rejection which has helped his AIHA as well. Appears to be recurring as steroid is being tapered down. Would suggest to increase the steroid dose and consider Rituximab maintenance for 1-2 years if there is no contraindication from the transplant perspective. Patient revisited for possible new onset of hemolysis. 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 Extensive w/u did not reveal any neoplastic process. He has received steroid, PLEX, IVIG and rituxan in the recent past to prevent graft rejection which has helped his AIHA as well. Appears to be recurring as steroid is being tapered down. Would suggest to increase the steroid dose for now and consider Rituximab maintenance for 1-2 years if a cause/trigger (lymphoma/drug induced) cannot be established and there is no contraindication from the transplant perspective.

## 2020-09-09 NOTE — PROGRESS NOTE ADULT - ASSESSMENT
A/P:  70y M with history of heart transplant in 2/2018 admitted with autoimmune hemolytic anemia and dark stools requiring transfusion. Found to have c diff and abdominal distension on 8/23.  Pt developed Ileus w/ NGT placement and remains NPO.    TPN consulted to assist w/ management in pt who will be NPO for a prolonged period of time  Pt w/ Acute Severe Protein-Calorie Malnutrition  Pt continues on TPN  w/ 135g AA, 140g Dextrose, & 20g Lipids in 3L total volume        and Glucerna 1.5 TF at 10cc/h            - HyperTg- improving w/ Lipids at 20g Lipids     - Hyperglycemia- increase Insulin in TPN from 30 to 40U                     d/w CTU so as to be able to stop Insulin gtt managed by CTU              pt on steriods              d/w CTU to decide whether to continue Insulin gtt vs standing                 Insulin w/ FS q6H w/ ISS coverage to cover Enteral (TF) nutrition  - Strict Intake and Output- fluid overload              HyperNa - improving w/ 3L volume              NaPhos 30mMol             Pt on Lasix gtt- consider metolazone 10mg QD instead of lasix             Sheldon resolving  - HypoMg- increase MgSO4 from 10 to 12mEq  - HypoPhos improving w/ 30mMol NaPhos  - Elevated Alk Phos & Lfts- continue to monitor  - Weights as per protocol  - Monitor  CMP, Mg, iCa, & Phos daily  - Check Prealbumin check weekly  - Central line w/ designated port for TPN, maintenance as per protocol  - Continue monitoring as per CTU w/ Surgery, will follow with you, d/w SHELLY Barragan-C  (B) [phone]  d/w Ayan Hansen & Marciano A/P:  70y M with history of heart transplant in 2/2018 admitted with autoimmune hemolytic anemia and dark stools requiring transfusion. Found to have c diff and abdominal distension on 8/23.  Pt developed Ileus w/ NGT placement and remains NPO.    TPN consulted to assist w/ management in pt who will be NPO for a prolonged period of time  Pt w/ Acute Severe Protein-Calorie Malnutrition  Pt continues on TPN  w/ 135g AA, 140g Dextrose, & 20g Lipids in 3L total volume        and Glucerna 1.5 TF at 10cc/h  Pt tolerating sips of clears            - HyperTg- improving w/ Lipids at 20g Lipids     - Hyperglycemia- increase Insulin in TPN from 30 to 40U                     d/w CTU so as to be able to stop Insulin gtt managed by CTU              pt on steriods              d/w CTU to decide whether to continue Insulin gtt vs standing                 Insulin w/ FS q6H w/ ISS coverage to cover Enteral (TF) nutrition  - Strict Intake and Output- fluid overload              HyperNa - improving w/ 3L volume              NaPhos 30mMol             Pt on Lasix gtt- consider metolazone 10mg QD instead of lasix             Sheldon resolving  - HypoMg- increase MgSO4 from 10 to 12mEq             Maintaining Mg levels will assist GI motility  - HypoPhos improving w/ 30mMol NaPhos  - Elevated Alk Phos & Lfts- continue to monitor  - Weights as per protocol  - Monitor  CMP, Mg, iCa, & Phos daily  - Check Prealbumin check weekly  - Central line w/ designated port for TPN, maintenance as per protocol  - Continue monitoring as per CTU w/ Surgery, will follow with you, d/w SHELLY Barragan-C  [phone]) [phone]  d/w Ayan Hansen & Marciano A/P:  70y M with history of heart transplant in 2/2018 admitted with autoimmune hemolytic anemia and dark stools requiring transfusion. Found to have c diff and abdominal distension on 8/23.  Pt developed Ileus w/ NGT placement and remains NPO.    TPN consulted to assist w/ management in pt who will be NPO for a prolonged period of time  Pt w/ Acute Severe Protein-Calorie Malnutrition  Pt continues on TPN  w/ 135g AA, 140g Dextrose, & 20g Lipids in 3L total volume        and Glucerna 1.5 TF at 10cc/h  Pt tolerating sips of clears            - HyperTg- improving w/ Lipids at 20g Lipids               continue to monitor daily     - Hyperglycemia- increase Insulin in TPN from 30 to 40U                     d/w CTU so as to be able to stop Insulin gtt managed by CTU              pt on steriods              d/w CTU to decide whether to continue Insulin gtt vs standing                 Insulin w/ FS q6H w/ ISS coverage to cover Enteral (TF) nutrition  - Strict Intake and Output- fluid overload              HyperNa - improving w/ 3L volume              NaPhos 30mMol             Pt on Lasix gtt- consider metolazone 10mg QD instead of lasix             Sheldon resolving  - HypoMg- increase MgSO4 from 10 to 12mEq             Maintaining Mg levels will assist GI motility  - HypoPhos improving w/ 30mMol NaPhos  - Elevated Alk Phos & Lfts- continue to monitor  - Weights as per protocol  - Monitor  CMP, Mg, iCa, & Phos daily  - Check Prealbumin check weekly  - Central line w/ designated port for TPN, maintenance as per protocol  - Continue monitoring as per CTU w/ Surgery, will follow with you, d/w SHELLY Barragan-C  (B) [phone]  d/w Ayan Hansen & Marciano

## 2020-09-09 NOTE — PROGRESS NOTE ADULT - PROBLEM SELECTOR PLAN 4
- Resolved with diuresis.  - Continue lasix 5 mg/hr as he is responding very well, but remains with anasarca. - Generally resolved, but fluctuates a little with diuresis.  - Continue lasix 5 mg/hr as he is responding very well, but remains with anasarca.  - If SCr is higher tomorrow, will reduce infusion.

## 2020-09-09 NOTE — PROGRESS NOTE ADULT - ASSESSMENT
Mr. 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 He was treated with Rituximab and high dose steroids. He is currently admitted with symptomatic anemia with Hgb initially of 6.6 requiring blood transfusions. Evaluation was not consistent with hemolysis. EGD/enteroscopy eventually revealed chronic gastritis and small bowel ectasias.  His course was complicated by development of severe leukopenia and acute respiratory distress and profound sinus tachycardia on 8/13 in setting of Klebsiella bacteremia of unclear origin (preceded EGD). A CT scan of the chest showed a possible infiltrate. While his bacteremia was treated, he subsequently developed C diff colitis with severe abdominal distention. He also developed worsening acute on chronic renal failure, likely due to volume depletion, which has resolved. He is currently volume overloaded and diuretics were started on 9/1 with good response. He has ongoing improvement in abdominal distention, but has persistent leukocytosis. Now tolerating enteral feeds.

## 2020-09-09 NOTE — PROGRESS NOTE ADULT - PROBLEM SELECTOR PLAN 2
- We will adjust IV cyclosporine as necessary. Currently at 18mg/24hours. Goal level 100 +/- 20.  - Eventual plan to try switch back to tacrolimus as monotherapy  - Everolimus level was 5.3 on 8/31 and has been discontinued.  Repeat level sent 9/8 and pending.  - Continue current methylprednisolone dose (equivalent to 10 mg daily of prednisone). This was decreased on 9/5. Will need to review with Hematology additional weaning given hemolytic anemia.  - Will need to resume statin and aspirin when stable. - We will adjust IV cyclosporine as necessary. Currently at 18mg/24hours. Goal level 100 +/- 20. Level dropped to 41 from 79 despite increase yesterday. Increase to 20 mg/24h today.  - Eventual plan to try switch back to tacrolimus as monotherapy  - Everolimus level was 5.3 on 8/31 and has been discontinued.  Repeat level sent 9/8 and pending.  - Continue current methylprednisolone dose (equivalent to 10 mg daily of prednisone). This was decreased on 9/5. Will need to review with Hematology additional weaning given hemolytic anemia.  - Will need to resume statin and aspirin when stable.

## 2020-09-09 NOTE — PROGRESS NOTE ADULT - PROBLEM SELECTOR PLAN 5
- LDH and haptoglobin suggest hemolysis, but bilirubin does not. Will need to review with Hematology. Prior haptoglobin was detectable. Please reconsult Hematology given evidence of possible recurrent hemolysis.  - Will also review with Blood Bank Director, Dr. Tomlinson given suspected autoimmune nature of hemolysis.  - Send G6PD to look for deficiency as contributor to the hemolytic anemia. - LDH and haptoglobin suggest hemolysis, but bilirubin does not. Will need to review with Hematology. Prior haptoglobin was detectable. Please reconsult Hematology given evidence of possible recurrent hemolysis.  - Will also review with Blood Bank Director, Dr. Tomlinson given suspected autoimmune nature of hemolysis. Repeat Type & Screen and Jacky.  - Send G6PD to look for deficiency as contributor to the hemolytic anemia.

## 2020-09-09 NOTE — PROGRESS NOTE ADULT - SUBJECTIVE AND OBJECTIVE BOX
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 Patient was treated w/ prednisone and Rituximab. Tacro was discontinued and patient was also transitioned to everolimus  Admitted  6/16-6/19  for hyperglycemia.  Reported to transplant team having one done black tarry stool-  instructed to  present to [facility] for hemolytic anemia. Patient has been following with Hematology outpatient.  Denies CP  N/V diarrhea SOB. (11 Aug 2020 20:08)      Surgery/Hospital course:  8/11 Admitted to [facility] with symptomatic anemia  8/13 EGD for investigation of tarry stools  8/15 SB capsule: stomach & SB lesions, likely ulcers.  8/17 EGD/push enteroscopy w/ angioectasias/erosions in small bowel. Gastropathy and esophagitis. Ulcer seen on VCE most likely scope trauma.    8/18 VSS transferred back to floor.   8/20 VS 9.0/28.4 stable Gastric biopsy results LA Grade A esophagitis.  - Acute gastritis. Biopsies taken to r/o CMV, No ulceration seen despite finding on capsule endoscopy - likely 2/2 scope  trauma that has since resolved. Pathology pending.  8/21 VSS H/H  8.1/25.7  trending down will monitor prednisone taper 30 mg today. Procardia 120 initiated today RHC biopsy Monday.   8/22 VVS; Patient reports loose stools x 2-3 days with 1 episode today.  Stool sent for C-dif and GI PCR. Abdominal X-ray revealed: dilated loops of bowel. Abdomen distended.  Patient denies N/V. GI called to re-evaluate. Continue with current medication regimen.  Awaiting for upcoming cardiac biopsy on Monday 8/24.  8/23   vss    creat up to 2.28 ,  repeating CMP,  TTE ordered  Bladder scan   8/24   cardiac bx cancelled   elev creat  to 2.74   cardio renal cslt called,    + CDIF   inc vanco to 500 q6   ID following  8/25 VSS: RHC today as per transplant team; transfuse 2 units PRBC today as per Dr. Viramontes; continue vanco for cdiff; transfer patient to CTU after cath today   8/26. Diuretic challenge with good response   8/27: Downgraded to the floor then upgraded to the CTU again for concern of abd distention   8/28 CT ABD & Pelvis reveals pancolitis  8/30: repeat CT scan of abd, gen surgery called to re-evaluated pt.  8/31: RUE duplex negative for DVT  9/4 NGT clamped, minimal residual. NGT removed. started sips   9/6 tolerating clear liquid diet  9/8 Bronch    Today/Overnight:  Persistent right lower lobe collapse    Vital Signs Last 24 Hrs  T(C): 36.8 (09 Sep 2020 16:00), Max: 37.1 (09 Sep 2020 00:00)  T(F): 98.2 (09 Sep 2020 16:00), Max: 98.8 (09 Sep 2020 00:00)  HR: 129 (09 Sep 2020 19:00) (114 - 129)  BP: --  BP(mean): --  RR: 23 (09 Sep 2020 19:00) (11 - 42)  SpO2: 100% (09 Sep 2020 19:00) (84% - 100%)  ============================I/O===========================  I&O's Detail    08 Sep 2020 07:01  -  09 Sep 2020 07:00  --------------------------------------------------------  IN:    fat emulsion (Fish Oil and Plant Based) 20% Infusion: 91.3 mL    furosemide Infusion: 60 mL    Glucerna 1.5: 170 mL    insulin regular Infusion: 75.5 mL    Oral Fluid: 120 mL    Solution: 35.7 mL    TPN (Total Parenteral Nutrition): 3000 mL  Total IN: 3552.5 mL    OUT:    Indwelling Catheter - Urethral: 6480 mL  Total OUT: 6480 mL    Total NET: -2927.5 mL      09 Sep 2020 07:01  -  09 Sep 2020 19:18  --------------------------------------------------------  IN:    fat emulsion (Fish Oil and Plant Based) 20% Infusion: 33.2 mL    furosemide Infusion: 30 mL    Glucerna 1.5: 120 mL    insulin regular Infusion: 35 mL    Solution: 25.2 mL    TPN (Total Parenteral Nutrition): 1500 mL  Total IN: 1743.4 mL    OUT:    Indwelling Catheter - Urethral: 3240 mL  Total OUT: 3240 mL    Total NET: -1496.6 mL    ============================ LABS =========================                        7.2    17.81 )-----------( 123      ( 09 Sep 2020 00:29 )             22.8     09-09    137  |  100  |  98<H>  ----------------------------<  111<H>  4.4   |  25  |  1.34<H>    Ca    8.3<L>      09 Sep 2020 00:29  Phos  4.5     09-09  Mg     1.8     09-09    TPro  6.5  /  Alb  2.6<L>  /  TBili  0.9  /  DBili  x   /  AST  72<H>  /  ALT  45  /  AlkPhos  210<H>  09-09    LIVER FUNCTIONS - ( 09 Sep 2020 00:29 )  Alb: 2.6 g/dL / Pro: 6.5 g/dL / ALK PHOS: 210 U/L / ALT: 45 U/L / AST: 72 U/L / GGT: x             ABG - ( 09 Sep 2020 00:27 )  pH, Arterial: 7.36  pH, Blood: x     /  pCO2: 50    /  pO2: 138   / HCO3: 27    / Base Excess: 2.1   /  SaO2: 99          ======================Micro/Rad/Cardio=================  Culture: Reviewed   CXR: Reviewed  Echo: Reviewed  ======================================================  PAST MEDICAL & SURGICAL HISTORY:  H/O hemolytic anemia  H/O autoimmune hemolytic anemia  Knee pain, right  HLD (hyperlipidemia)  Former smoker  DVT of upper extremity (deep vein thrombosis)  Hepatitis C virus  GIB (gastrointestinal bleeding)  Ventricular fibrillation: s/p AICD  PAF (paroxysmal atrial fibrillation): on xarelto  Non-Ischemic Cardiomyopathy  SVT (Supraventricular Tachycardia)  HTN  CHF (Congestive Heart Failure)  S/P right heart catheterization: biopsy multiple  H/O heart transplant: 2/2018  Status post left hip replacement  History of Prior Ablation Treatment: for afib  AICD (Automatic Cardioverter/Defibrillator) Present: [employer] with 1 [employer] lead4/1/09- explanted and replaced with Medtronic 2 leads on 9/2/09    ========================ASSESSMENT ================  Heart transplant 2/2018 for ACC/AHA stage D NICM   Resolved GI Bleed, Melena s/p EGD  Acute respiratory failure, resolved  Supraventricular tachycardia  Severe hypertension  Hyperlactatemia acidosis, resolved  Leukopenia- resolved  Acute blood loss anemia, stable   CKD Stage III  C. diff diarrhea  Klebsiella oxytoca bacteremia  Sepsis  Azotemia 2/2 CKD and Steroids   Pancolitis    Plan:  ====================== NEUROLOGY=====================  Off sedation, continue to monitor neuro status as per ICU protocol.    ==================== RESPIRATORY======================  Patient persistently has RLL collapse on CXR, bronch'ed yesterday without improvement in CXR.   Supplemental O2 via 4L NC with intermittent BiPAP.  Encourage incentive spirometry, continue pulse ox monitoring, follow ABGs.   Encourage chest PT, PT, OOB to chair    ====================CARDIOVASCULAR==================  Transplant team following.   Heart transplant 2/2018 for ACC/AHA stage D NICM  (NPO for abd distention) off everolimus. Off cellcept while on high dose steroids.   Continue hemodynamic monitoring.   ASA on hold for hx of GI bleed / ulceration.   Hydralazine for MAP 70-80    furosemide Infusion 5 mG/Hr (2.5 mL/Hr) IV Continuous <Continuous>  hydrALAZINE 50 milliGRAM(s) Oral every 8 hours    ===================HEMATOLOGIC/ONC ===================  Anemia s/p multiple blood transfusions.   Continue closely monitoring H&H and transfuse prn.   GI Bleeding ruled out s/p EGD, found to have ulcers. F/u heme recs for hx of autoimmune hemolytic anemia.      VTE prophylaxis, heparin   Injectable 5000 Unit(s) SubCutaneous every 8 hours    ===================== RENAL =========================  NORMAN on CKD stage 3, Anasarca. Diuresis with Lasix gtt   Continue monitoring urine output, BUN/Creatinine, I/Os and replete electrolytes prn.     ==================== GASTROINTESTINAL===================  Nutrition following, continue clear liquid diet   Ileus present in setting of Cdiff.- NGT clamped 9/4 for 4 hrs, no residual. NGT removed, stared on sips, c/w TPN for now.  CT A/P on 8/30 with Pancolitis and small volume abdominopelvic ascites, not significantly changed since 8/20/2020.  General surgery following-continue serial abdominal exams. No surgical intervention at this time.     albumin human 25% IVPB 50 milliLiter(s) IV Intermittent every 10 minutes  fat emulsion (Fish Oil and Plant Based) 20% Infusion 8.3 mL/Hr (8.3 mL/Hr) IV Continuous <Continuous>  GI prophylaxis, pantoprazole  Injectable 40 milliGRAM(s) IV Push daily  Parenteral Nutrition - Adult 1 Each (125 mL/Hr) TPN Continuous <Continuous>  Parenteral Nutrition - Adult 1 Each (125 mL/Hr) TPN Continuous <Continuous>    =======================    ENDOCRINE  =====================  Glucose control with insulin drip for stress hyperglycemia     insulin regular Infusion 1 Unit(s)/Hr (1 mL/Hr) IV Continuous <Continuous>    ========================INFECTIOUS DISEASE================  Klebsiella bacteremia from 8/13 which has since cleared  Clostridium difficile PCR positive, continue with vancomycin PO and Rectal Vanco   Transplant prophylaxis with Posaconazole and Atovaquone on hold due to ileus   Dr. Lujan following, no need for additional abx at this time. Monitor for now.     vancomycin    Solution 500 milliGRAM(s) Oral every 6 hours  vancomycin  Retention Enema 500 milliGRAM(s) Rectal every 6 hours      Patient requires continuous monitoring with bedside rhythm monitoring, pulse oximetry monitoring, and continuous central venous and arterial pressure monitoring; and intermittent blood gas analysis.  Care plan discussed with ICU care team.    Patient remained critical, at risk for life threatening decompensation.   I have spent 35 minutes providing acute care with multiple re-evaluations throughout the evening.     By signing my name below, I, Tonny Sheldon, attest that this documentation has been prepared under the direction and in the presence of SHELLY Hooper.  Electronically signed: Katty Trejo, 09-09-20 @ 19:18    I, SHELLY Hooper, personally performed the services described in this documentation. All medical record entries made by the ramuibkurt were at my direction and in my presence. 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 Patient was treated w/ prednisone and Rituximab. Tacro was discontinued and patient was also transitioned to everolimus  Admitted  6/16-6/19  for hyperglycemia.  Reported to transplant team having one done black tarry stool-  instructed to  present to [facility] for hemolytic anemia. Patient has been following with Hematology outpatient.  Denies CP  N/V diarrhea SOB. (11 Aug 2020 20:08)      Surgery/Hospital course:  8/11 Admitted to [facility] with symptomatic anemia  8/13 EGD for investigation of tarry stools  8/15 SB capsule: stomach & SB lesions, likely ulcers.  8/17 EGD/push enteroscopy w/ angioectasias/erosions in small bowel. Gastropathy and esophagitis. Ulcer seen on VCE most likely scope trauma.    8/18 VSS transferred back to floor.   8/20 VS 9.0/28.4 stable Gastric biopsy results LA Grade A esophagitis.  - Acute gastritis. Biopsies taken to r/o CMV, No ulceration seen despite finding on capsule endoscopy - likely 2/2 scope  trauma that has since resolved. Pathology pending.  8/21 VSS H/H  8.1/25.7  trending down will monitor prednisone taper 30 mg today. Procardia 120 initiated today RHC biopsy Monday.   8/22 VVS; Patient reports loose stools x 2-3 days with 1 episode today.  Stool sent for C-dif and GI PCR. Abdominal X-ray revealed: dilated loops of bowel. Abdomen distended.  Patient denies N/V. GI called to re-evaluate. Continue with current medication regimen.  Awaiting for upcoming cardiac biopsy on Monday 8/24.  8/23   vss    creat up to 2.28 ,  repeating CMP,  TTE ordered  Bladder scan   8/24   cardiac bx cancelled   elev creat  to 2.74   cardio renal cslt called,    + CDIF   inc vanco to 500 q6   ID following  8/25 VSS: RHC today as per transplant team; transfuse 2 units PRBC today as per Dr. Viramontes; continue vanco for cdiff; transfer patient to CTU after cath today   8/26. Diuretic challenge with good response   8/27: Downgraded to the floor then upgraded to the CTU again for concern of abd distention   8/28 CT ABD & Pelvis reveals pancolitis  8/30: repeat CT scan of abd, gen surgery called to re-evaluated pt.  8/31: RUE duplex negative for DVT  9/4 NGT clamped, minimal residual. NGT removed. started sips   9/6 tolerating clear liquid diet  9/8 Bronch    Today/Overnight: F/u hemolysis labs, possibility of prednisone to be discussed tomorrow, cotninue insulin and lasix gtts  Persistent right lower lobe collapse    Vital Signs Last 24 Hrs  T(C): 36.8 (09 Sep 2020 16:00), Max: 37.1 (09 Sep 2020 00:00)  T(F): 98.2 (09 Sep 2020 16:00), Max: 98.8 (09 Sep 2020 00:00)  HR: 129 (09 Sep 2020 19:00) (114 - 129)  BP: --  BP(mean): --  RR: 23 (09 Sep 2020 19:00) (11 - 42)  SpO2: 100% (09 Sep 2020 19:00) (84% - 100%)  ============================I/O===========================  I&O's Detail    08 Sep 2020 07:01  -  09 Sep 2020 07:00  --------------------------------------------------------  IN:    fat emulsion (Fish Oil and Plant Based) 20% Infusion: 91.3 mL    furosemide Infusion: 60 mL    Glucerna 1.5: 170 mL    insulin regular Infusion: 75.5 mL    Oral Fluid: 120 mL    Solution: 35.7 mL    TPN (Total Parenteral Nutrition): 3000 mL  Total IN: 3552.5 mL    OUT:    Indwelling Catheter - Urethral: 6480 mL  Total OUT: 6480 mL    Total NET: -2927.5 mL      09 Sep 2020 07:01  -  09 Sep 2020 19:18  --------------------------------------------------------  IN:    fat emulsion (Fish Oil and Plant Based) 20% Infusion: 33.2 mL    furosemide Infusion: 30 mL    Glucerna 1.5: 120 mL    insulin regular Infusion: 35 mL    Solution: 25.2 mL    TPN (Total Parenteral Nutrition): 1500 mL  Total IN: 1743.4 mL    OUT:    Indwelling Catheter - Urethral: 3240 mL  Total OUT: 3240 mL    Total NET: -1496.6 mL    ============================ LABS =========================                        7.2    17.81 )-----------( 123      ( 09 Sep 2020 00:29 )             22.8     09-09    137  |  100  |  98<H>  ----------------------------<  111<H>  4.4   |  25  |  1.34<H>    Ca    8.3<L>      09 Sep 2020 00:29  Phos  4.5     09-09  Mg     1.8     09-09    TPro  6.5  /  Alb  2.6<L>  /  TBili  0.9  /  DBili  x   /  AST  72<H>  /  ALT  45  /  AlkPhos  210<H>  09-09    LIVER FUNCTIONS - ( 09 Sep 2020 00:29 )  Alb: 2.6 g/dL / Pro: 6.5 g/dL / ALK PHOS: 210 U/L / ALT: 45 U/L / AST: 72 U/L / GGT: x             ABG - ( 09 Sep 2020 00:27 )  pH, Arterial: 7.36  pH, Blood: x     /  pCO2: 50    /  pO2: 138   / HCO3: 27    / Base Excess: 2.1   /  SaO2: 99          ======================Micro/Rad/Cardio=================  Culture: Reviewed   CXR: Reviewed  Echo: Reviewed  ======================================================  PAST MEDICAL & SURGICAL HISTORY:  H/O hemolytic anemia  H/O autoimmune hemolytic anemia  Knee pain, right  HLD (hyperlipidemia)  Former smoker  DVT of upper extremity (deep vein thrombosis)  Hepatitis C virus  GIB (gastrointestinal bleeding)  Ventricular fibrillation: s/p AICD  PAF (paroxysmal atrial fibrillation): on xarelto  Non-Ischemic Cardiomyopathy  SVT (Supraventricular Tachycardia)  HTN  CHF (Congestive Heart Failure)  S/P right heart catheterization: biopsy multiple  H/O heart transplant: 2/2018  Status post left hip replacement  History of Prior Ablation Treatment: for afib  AICD (Automatic Cardioverter/Defibrillator) Present: [employer] with 1 [employer] lead4/1/09- explanted and replaced with Medtronic 2 leads on 9/2/09    ========================ASSESSMENT ================  Heart transplant 2/2018 for ACC/AHA stage D NICM   Resolved GI Bleed, Melena s/p EGD  Acute respiratory failure, resolved  Supraventricular tachycardia  Severe hypertension  Hyperlactatemia acidosis, resolved  Leukopenia- resolved  Acute blood loss anemia, stable   CKD Stage III  C. diff diarrhea  Klebsiella oxytoca bacteremia  Sepsis  Azotemia 2/2 CKD and Steroids   Pancolitis    Plan:  ====================== NEUROLOGY=====================  Off sedation, continue to monitor neuro status as per ICU protocol.    ==================== RESPIRATORY======================  Patient persistently has RLL collapse on CXR, bronch'ed yesterday without improvement in CXR.   Supplemental O2 via 4L NC with intermittent BiPAP.  Encourage incentive spirometry, continue pulse ox monitoring, follow ABGs.   Encourage chest PT, PT, OOB to chair    ====================CARDIOVASCULAR==================  Transplant team following.   Heart transplant 2/2018 for ACC/AHA stage D NICM  (NPO for abd distention) off everolimus. Off cellcept while on high dose steroids.   Continue hemodynamic monitoring.   ASA on hold for hx of GI bleed / ulceration.   Hydralazine for MAP 70-80    furosemide Infusion 5 mG/Hr (2.5 mL/Hr) IV Continuous <Continuous>  hydrALAZINE 50 milliGRAM(s) Oral every 8 hours    ===================HEMATOLOGIC/ONC ===================  Anemia s/p multiple blood transfusions.   Continue closely monitoring H&H and transfuse prn.   GI Bleeding ruled out s/p EGD, found to have ulcers. F/u heme recs for hx of autoimmune hemolytic anemia.      VTE prophylaxis, heparin   Injectable 5000 Unit(s) SubCutaneous every 8 hours    ===================== RENAL =========================  NORMAN on CKD stage 3, Anasarca. Diuresis with Lasix gtt   Continue monitoring urine output, BUN/Creatinine, I/Os and replete electrolytes prn.     ==================== GASTROINTESTINAL===================  Nutrition following, continue clear liquid diet   Ileus present in setting of Cdiff.- NGT clamped 9/4 for 4 hrs, no residual. NGT removed, stared on sips, c/w TPN for now.  CT A/P on 8/30 with Pancolitis and small volume abdominopelvic ascites, not significantly changed since 8/20/2020.  General surgery following-continue serial abdominal exams. No surgical intervention at this time.     albumin human 25% IVPB 50 milliLiter(s) IV Intermittent every 10 minutes  fat emulsion (Fish Oil and Plant Based) 20% Infusion 8.3 mL/Hr (8.3 mL/Hr) IV Continuous <Continuous>  GI prophylaxis, pantoprazole  Injectable 40 milliGRAM(s) IV Push daily  Parenteral Nutrition - Adult 1 Each (125 mL/Hr) TPN Continuous <Continuous>  Parenteral Nutrition - Adult 1 Each (125 mL/Hr) TPN Continuous <Continuous>    =======================    ENDOCRINE  =====================  Glucose control with insulin drip for stress hyperglycemia     insulin regular Infusion 1 Unit(s)/Hr (1 mL/Hr) IV Continuous <Continuous>    ========================INFECTIOUS DISEASE================  Klebsiella bacteremia from 8/13 which has since cleared  Clostridium difficile PCR positive, continue with vancomycin PO and Rectal Vanco   Transplant prophylaxis with Posaconazole and Atovaquone on hold due to ileus   Dr. Lujan following, no need for additional abx at this time. Monitor for now.     vancomycin    Solution 500 milliGRAM(s) Oral every 6 hours  vancomycin  Retention Enema 500 milliGRAM(s) Rectal every 6 hours      Patient requires continuous monitoring with bedside rhythm monitoring, pulse oximetry monitoring, and continuous central venous and arterial pressure monitoring; and intermittent blood gas analysis.  Care plan discussed with ICU care team.    Patient remained critical, at risk for life threatening decompensation.   I have spent 35 minutes providing acute care with multiple re-evaluations throughout the evening.     By signing my name below, I, Tonny Sheldon, attest that this documentation has been prepared under the direction and in the presence of SHELLY Hooper.  Electronically signed: Katty Trejo, 09-09-20 @ 19:18    I, SHELLY Hooper, personally performed the services described in this documentation. All medical record entries made by the scribe were at my direction and in my presence. I have reviewed the chart and agree that the record reflects my personal performance and is accurate and complete  Electronically signed: SHELLY Hooper, 09-09-20 @ 19:18

## 2020-09-09 NOTE — PROGRESS NOTE ADULT - SUBJECTIVE AND OBJECTIVE BOX
Subjective:    Medications:  albumin human 25% IVPB 50 milliLiter(s) IV Intermittent every 10 minutes  benzocaine 15 mG/menthol 3.6 mG (Sugar-Free) Lozenge 1 Lozenge Oral every 6 hours PRN  chlorhexidine 2% Cloths 1 Application(s) Topical <User Schedule>  cycloSPORINE  (SandIMMUNE) IVPB 18 milliGRAM(s) IV Intermittent every 24 hours  fat emulsion (Fish Oil and Plant Based) 20% Infusion 8.3 mL/Hr IV Continuous <Continuous>  furosemide Infusion 5 mG/Hr IV Continuous <Continuous>  heparin   Injectable 5000 Unit(s) SubCutaneous every 8 hours  hydrALAZINE 50 milliGRAM(s) Oral every 8 hours  insulin regular Infusion 1 Unit(s)/Hr IV Continuous <Continuous>  methylPREDNISolone sodium succinate Injectable 8 milliGRAM(s) IV Push <User Schedule>  pantoprazole  Injectable 40 milliGRAM(s) IV Push daily  Parenteral Nutrition - Adult 1 Each TPN Continuous <Continuous>  vancomycin    Solution 500 milliGRAM(s) Oral every 6 hours  vancomycin  Retention Enema 500 milliGRAM(s) Rectal every 6 hours    Vitals:  T(C): 36.5 (09-09-20 @ 08:00), Max: 37.1 (09-09-20 @ 00:00)  HR: 125 (09-09-20 @ 08:00) (114 - 133)  BP: --  BP(mean): --  ABP: 117/59 (09-09-20 @ 08:00) (112/52 - 145/74)  ABP(mean): 77 (09-09-20 @ 08:00) (71 - 95)  RR: 32 (09-09-20 @ 08:00) (11 - 42)  SpO2: 100% (09-09-20 @ 08:00) (94% - 100%)  CVP(cm H2O): --  CO: --  CI: --  PA: --  PA(mean): --  PCWP: --  SVR: --  PVR: --      I&O's Summary    08 Sep 2020 07:01  -  09 Sep 2020 07:00  --------------------------------------------------------  IN: 3552.5 mL / OUT: 6480 mL / NET: -2927.5 mL    09 Sep 2020 07:01  -  09 Sep 2020 08:41  --------------------------------------------------------  IN: 139.5 mL / OUT: 200 mL / NET: -60.5 mL      General: No distress. Comfortable.  HEENT: EOM intact.  Neck: Neck supple. JVP mildly elevated. No masses  Chest: Clear to auscultation bilaterally.  CV: Tachycardic, regular. Normal S1 and S2. III/VI combined systolic and diastolic murmur heard across precordium. No rubs or gallops.  Abdomen: Distended, but compressible. Non-tender. Prominent bowel sounds.   Extremities: RUE>>LUE edema, diffuse anasarca  Skin: No rashes  Neurology: Alert and oriented. Sensation intact  Psych: Affect normal        Labs:                        7.2    17.81 )-----------( 123      ( 09 Sep 2020 00:29 )             22.8     09-09    137  |  100  |  98<H>  ----------------------------<  111<H>  4.4   |  25  |  1.34<H>    Ca    8.3<L>      09 Sep 2020 00:29  Phos  4.5     09-09  Mg     1.8     09-09    TPro  6.5  /  Alb  2.6<L>  /  TBili  0.9  /  DBili  x   /  AST  72<H>  /  ALT  45  /  AlkPhos  210<H>  09-09              TELEMETRY: Subjective: has been OOB since 5 am and feels exhausted, remains total body fluid overloaded, but no SOB. Passing gas, but denies having BM today. No pain or N/V.    Medications:  albumin human 25% IVPB 50 milliLiter(s) IV Intermittent every 10 minutes  benzocaine 15 mG/menthol 3.6 mG (Sugar-Free) Lozenge 1 Lozenge Oral every 6 hours PRN  chlorhexidine 2% Cloths 1 Application(s) Topical <User Schedule>  cycloSPORINE  (SandIMMUNE) IVPB 18 milliGRAM(s) IV Intermittent every 24 hours  fat emulsion (Fish Oil and Plant Based) 20% Infusion 8.3 mL/Hr IV Continuous <Continuous>  furosemide Infusion 5 mG/Hr IV Continuous <Continuous>  heparin   Injectable 5000 Unit(s) SubCutaneous every 8 hours  hydrALAZINE 50 milliGRAM(s) Oral every 8 hours  insulin regular Infusion 1 Unit(s)/Hr IV Continuous <Continuous>  methylPREDNISolone sodium succinate Injectable 8 milliGRAM(s) IV Push <User Schedule>  pantoprazole  Injectable 40 milliGRAM(s) IV Push daily  Parenteral Nutrition - Adult 1 Each TPN Continuous <Continuous>  vancomycin    Solution 500 milliGRAM(s) Oral every 6 hours  vancomycin  Retention Enema 500 milliGRAM(s) Rectal every 6 hours    Vitals:  T(C): 36.5 (09-09-20 @ 08:00), Max: 37.1 (09-09-20 @ 00:00)  HR: 125 (09-09-20 @ 08:00) (114 - 133)  ABP: 117/59 (09-09-20 @ 08:00) (112/52 - 145/74)  ABP(mean): 77 (09-09-20 @ 08:00) (71 - 95)  RR: 32 (09-09-20 @ 08:00) (11 - 42)  SpO2: 100% (09-09-20 @ 08:00) (94% - 100%)      I&O's Summary    08 Sep 2020 07:01  -  09 Sep 2020 07:00  --------------------------------------------------------  IN: 3552.5 mL / OUT: 6480 mL / NET: -2927.5 mL    09 Sep 2020 07:01  -  09 Sep 2020 08:41  --------------------------------------------------------  IN: 139.5 mL / OUT: 200 mL / NET: -60.5 mL      General: No distress. Comfortable.  HEENT: EOM intact.  Neck: Neck supple. JVP mildly elevated. No masses  Chest: Clear to auscultation bilaterally.  CV: Tachycardic, regular. Normal S1 and S2. III/VI combined systolic and diastolic murmur heard across precordium. No rubs or gallops.  Abdomen: Distended, but compressible. Non-tender. Prominent bowel sounds.   Extremities: RUE>>LUE edema, diffuse anasarca  Skin: No rashes  Neurology: Alert and oriented. Sensation intact  Psych: Affect normal        Labs:                        7.2    17.81 )-----------( 123      ( 09 Sep 2020 00:29 )             22.8     09-09    137  |  100  |  98<H>  ----------------------------<  111<H>  4.4   |  25  |  1.34<H>    Ca    8.3<L>      09 Sep 2020 00:29  Phos  4.5     09-09  Mg     1.8     09-09    TPro  6.5  /  Alb  2.6<L>  /  TBili  0.9  /  DBili  x   /  AST  72<H>  /  ALT  45  /  AlkPhos  210<H>  09-09

## 2020-09-10 LAB
ALBUMIN SERPL ELPH-MCNC: 2.5 G/DL — LOW (ref 3.3–5)
ALP SERPL-CCNC: 252 U/L — HIGH (ref 40–120)
ALT FLD-CCNC: 45 U/L — SIGNIFICANT CHANGE UP (ref 10–45)
ANION GAP SERPL CALC-SCNC: 13 MMOL/L — SIGNIFICANT CHANGE UP (ref 5–17)
AST SERPL-CCNC: 73 U/L — HIGH (ref 10–40)
BILIRUB DIRECT SERPL-MCNC: 0.4 MG/DL — HIGH (ref 0–0.2)
BILIRUB SERPL-MCNC: 0.8 MG/DL — SIGNIFICANT CHANGE UP (ref 0.2–1.2)
BUN SERPL-MCNC: 96 MG/DL — HIGH (ref 7–23)
CALCIUM SERPL-MCNC: 8.7 MG/DL — SIGNIFICANT CHANGE UP (ref 8.4–10.5)
CHLORIDE SERPL-SCNC: 94 MMOL/L — LOW (ref 96–108)
CO2 SERPL-SCNC: 27 MMOL/L — SIGNIFICANT CHANGE UP (ref 22–31)
CREAT SERPL-MCNC: 1.37 MG/DL — HIGH (ref 0.5–1.3)
CULTURE RESULTS: SIGNIFICANT CHANGE UP
CYCLOSPORINE SER-MCNC: 110 NG/ML — LOW (ref 150–400)
GAS PNL BLDA: SIGNIFICANT CHANGE UP
GAS PNL BLDA: SIGNIFICANT CHANGE UP
GLUCOSE BLDC GLUCOMTR-MCNC: 101 MG/DL — HIGH (ref 70–99)
GLUCOSE BLDC GLUCOMTR-MCNC: 110 MG/DL — HIGH (ref 70–99)
GLUCOSE BLDC GLUCOMTR-MCNC: 112 MG/DL — HIGH (ref 70–99)
GLUCOSE BLDC GLUCOMTR-MCNC: 115 MG/DL — HIGH (ref 70–99)
GLUCOSE BLDC GLUCOMTR-MCNC: 115 MG/DL — HIGH (ref 70–99)
GLUCOSE BLDC GLUCOMTR-MCNC: 116 MG/DL — HIGH (ref 70–99)
GLUCOSE BLDC GLUCOMTR-MCNC: 117 MG/DL — HIGH (ref 70–99)
GLUCOSE BLDC GLUCOMTR-MCNC: 118 MG/DL — HIGH (ref 70–99)
GLUCOSE BLDC GLUCOMTR-MCNC: 120 MG/DL — HIGH (ref 70–99)
GLUCOSE BLDC GLUCOMTR-MCNC: 121 MG/DL — HIGH (ref 70–99)
GLUCOSE BLDC GLUCOMTR-MCNC: 122 MG/DL — HIGH (ref 70–99)
GLUCOSE BLDC GLUCOMTR-MCNC: 123 MG/DL — HIGH (ref 70–99)
GLUCOSE BLDC GLUCOMTR-MCNC: 125 MG/DL — HIGH (ref 70–99)
GLUCOSE BLDC GLUCOMTR-MCNC: 126 MG/DL — HIGH (ref 70–99)
GLUCOSE BLDC GLUCOMTR-MCNC: 129 MG/DL — HIGH (ref 70–99)
GLUCOSE BLDC GLUCOMTR-MCNC: 139 MG/DL — HIGH (ref 70–99)
GLUCOSE BLDC GLUCOMTR-MCNC: 145 MG/DL — HIGH (ref 70–99)
GLUCOSE BLDC GLUCOMTR-MCNC: 156 MG/DL — HIGH (ref 70–99)
GLUCOSE BLDC GLUCOMTR-MCNC: 184 MG/DL — HIGH (ref 70–99)
GLUCOSE BLDC GLUCOMTR-MCNC: 185 MG/DL — HIGH (ref 70–99)
GLUCOSE SERPL-MCNC: 112 MG/DL — HIGH (ref 70–99)
HAPTOGLOB SERPL-MCNC: 23 MG/DL — LOW (ref 34–200)
HCT VFR BLD CALC: 22.3 % — LOW (ref 39–50)
HCT VFR BLD CALC: 27.1 % — LOW (ref 39–50)
HGB BLD-MCNC: 6.9 G/DL — CRITICAL LOW (ref 13–17)
HGB BLD-MCNC: 8.6 G/DL — LOW (ref 13–17)
LDH SERPL L TO P-CCNC: 873 U/L — HIGH (ref 50–242)
MAGNESIUM SERPL-MCNC: 1.8 MG/DL — SIGNIFICANT CHANGE UP (ref 1.6–2.6)
MCHC RBC-ENTMCNC: 30.9 GM/DL — LOW (ref 32–36)
MCHC RBC-ENTMCNC: 30.9 PG — SIGNIFICANT CHANGE UP (ref 27–34)
MCHC RBC-ENTMCNC: 31.5 PG — SIGNIFICANT CHANGE UP (ref 27–34)
MCHC RBC-ENTMCNC: 31.7 GM/DL — LOW (ref 32–36)
MCV RBC AUTO: 101.8 FL — HIGH (ref 80–100)
MCV RBC AUTO: 97.5 FL — SIGNIFICANT CHANGE UP (ref 80–100)
NRBC # BLD: 6 /100 WBCS — HIGH (ref 0–0)
NRBC # BLD: 6 /100 WBCS — HIGH (ref 0–0)
PHOSPHATE SERPL-MCNC: 3.9 MG/DL — SIGNIFICANT CHANGE UP (ref 2.5–4.5)
PLATELET # BLD AUTO: 125 K/UL — LOW (ref 150–400)
PLATELET # BLD AUTO: 129 K/UL — LOW (ref 150–400)
POTASSIUM SERPL-MCNC: 4.2 MMOL/L — SIGNIFICANT CHANGE UP (ref 3.5–5.3)
POTASSIUM SERPL-SCNC: 4.2 MMOL/L — SIGNIFICANT CHANGE UP (ref 3.5–5.3)
PROT SERPL-MCNC: 6.4 G/DL — SIGNIFICANT CHANGE UP (ref 6–8.3)
RBC # BLD: 2.19 M/UL — LOW (ref 4.2–5.8)
RBC # BLD: 2.78 M/UL — LOW (ref 4.2–5.8)
RBC # FLD: 24.4 % — HIGH (ref 10.3–14.5)
RBC # FLD: 26 % — HIGH (ref 10.3–14.5)
SODIUM SERPL-SCNC: 134 MMOL/L — LOW (ref 135–145)
SPECIMEN SOURCE: SIGNIFICANT CHANGE UP
TRIGL SERPL-MCNC: 200 MG/DL — HIGH (ref 10–149)
WBC # BLD: 22.33 K/UL — HIGH (ref 3.8–10.5)
WBC # BLD: 22.97 K/UL — HIGH (ref 3.8–10.5)
WBC # FLD AUTO: 22.33 K/UL — HIGH (ref 3.8–10.5)
WBC # FLD AUTO: 22.97 K/UL — HIGH (ref 3.8–10.5)

## 2020-09-10 PROCEDURE — 99291 CRITICAL CARE FIRST HOUR: CPT

## 2020-09-10 PROCEDURE — 71045 X-RAY EXAM CHEST 1 VIEW: CPT | Mod: 26

## 2020-09-10 PROCEDURE — 99233 SBSQ HOSP IP/OBS HIGH 50: CPT

## 2020-09-10 PROCEDURE — 99232 SBSQ HOSP IP/OBS MODERATE 35: CPT

## 2020-09-10 RX ORDER — MAGNESIUM SULFATE 500 MG/ML
2 VIAL (ML) INJECTION ONCE
Refills: 0 | Status: COMPLETED | OUTPATIENT
Start: 2020-09-10 | End: 2020-09-10

## 2020-09-10 RX ORDER — ELECTROLYTE SOLUTION,INJ
1 VIAL (ML) INTRAVENOUS
Refills: 0 | Status: DISCONTINUED | OUTPATIENT
Start: 2020-09-10 | End: 2020-09-10

## 2020-09-10 RX ORDER — I.V. FAT EMULSION 20 G/100ML
10.4 EMULSION INTRAVENOUS
Qty: 25 | Refills: 0 | Status: DISCONTINUED | OUTPATIENT
Start: 2020-09-10 | End: 2020-09-11

## 2020-09-10 RX ADMIN — HEPARIN SODIUM 5000 UNIT(S): 5000 INJECTION INTRAVENOUS; SUBCUTANEOUS at 14:32

## 2020-09-10 RX ADMIN — PANTOPRAZOLE SODIUM 40 MILLIGRAM(S): 20 TABLET, DELAYED RELEASE ORAL at 12:34

## 2020-09-10 RX ADMIN — Medication 1 EACH: at 17:10

## 2020-09-10 RX ADMIN — CHLORHEXIDINE GLUCONATE 1 APPLICATION(S): 213 SOLUTION TOPICAL at 05:36

## 2020-09-10 RX ADMIN — Medication 500 MILLIGRAM(S): at 03:00

## 2020-09-10 RX ADMIN — Medication 500 MILLIGRAM(S): at 05:00

## 2020-09-10 RX ADMIN — Medication 50 MILLIGRAM(S): at 06:25

## 2020-09-10 RX ADMIN — Medication 2.5 MG/HR: at 17:18

## 2020-09-10 RX ADMIN — HEPARIN SODIUM 5000 UNIT(S): 5000 INJECTION INTRAVENOUS; SUBCUTANEOUS at 21:58

## 2020-09-10 RX ADMIN — Medication 500 MILLIGRAM(S): at 21:58

## 2020-09-10 RX ADMIN — Medication 500 MILLIGRAM(S): at 20:47

## 2020-09-10 RX ADMIN — HEPARIN SODIUM 5000 UNIT(S): 5000 INJECTION INTRAVENOUS; SUBCUTANEOUS at 05:49

## 2020-09-10 RX ADMIN — Medication 50 GRAM(S): at 02:00

## 2020-09-10 RX ADMIN — I.V. FAT EMULSION 10.4 ML/HR: 20 EMULSION INTRAVENOUS at 17:10

## 2020-09-10 RX ADMIN — Medication 50 MILLIGRAM(S): at 14:32

## 2020-09-10 RX ADMIN — Medication 500 MILLIGRAM(S): at 12:50

## 2020-09-10 RX ADMIN — Medication 8 MILLIGRAM(S): at 08:05

## 2020-09-10 RX ADMIN — Medication 500 MILLIGRAM(S): at 14:50

## 2020-09-10 RX ADMIN — Medication 50 MILLIGRAM(S): at 21:58

## 2020-09-10 RX ADMIN — Medication 500 MILLIGRAM(S): at 08:07

## 2020-09-10 RX ADMIN — Medication 500 MILLIGRAM(S): at 00:49

## 2020-09-10 RX ADMIN — INSULIN HUMAN 1 UNIT(S)/HR: 100 INJECTION, SOLUTION SUBCUTANEOUS at 17:18

## 2020-09-10 RX ADMIN — CYCLOSPORINE 2.1 MILLIGRAM(S): 100 CAPSULE ORAL at 11:30

## 2020-09-10 NOTE — CHART NOTE - NSCHARTNOTEFT_GEN_A_CORE
Nutrition Follow Up Note    Patient seen for: Malnutrition follow up     Source: RN, medical record, CTU team, transplant team, TPN team.     Chart reviewed, events noted. 70 year old male with PMHx of NICM, s/p HM2 LVAD and OHT in 2018, with known coronary fistula. Recent admission for hemolytic anemia, now admitted for hyperglycemia and GIB. EGD and capsule study negative. Pt now C-Diff colitis w/o toxic megacolon. Pt receiving Vanco retention enema and Flagyl.  TPN team following, adjusting TPN as indicated. Pt on EN and PO diet as well.   He has ongoing improvement in abdominal distention.     Diet: Full Liquids + Glucerna 1.5 @ 20ml/[id number] + TPN  TPN infusing at 63ml/hr (135Gm amino acids, 140Gm dextrose, 25gm 20%lipids) to provide  1266cal (16.8cal/kg, 1.79Gm prot/Kg per adm wt: 75.2kg).   Pt has been tolerating EN @ 10ml/hr well, was increased to 20ml/hr today.     Per TPN team; total volume decreasing from 3L to 1.5. No change in AA or dextrose. Insulin increasing to 50units.     Patient reports: Pt seen was out of bed to chair.   States he is feeling a little better, was passing gas yesterday but none thus far today. Has 1 BM thus far today.      PO intake: Pt states he has been tolerating EN well as well as PO clear liquids. Per discussion with team, plan in advance EN and PO diet to asses for GI tolerance.      Source for PO intake: RN    Daily Weight in k.3 (09-10), Weight in k.2 (), Weight in k.2 (), Weight in k.6 (), Weight in k.4 (), Weight in k.8 ()    Drug Dosing Weight  Weight (kg): 75.2 (17 Aug 2020 14:29)  BMI (kg/m2): 26 (17 Aug 2020 14:29)      Pertinent Medications: MEDICATIONS  (STANDING):  albumin human 25% IVPB 50 milliLiter(s) IV Intermittent every 10 minutes  chlorhexidine 2% Cloths 1 Application(s) Topical <User Schedule>  cycloSPORINE  (SandIMMUNE) IVPB 20 milliGRAM(s) IV Intermittent every 24 hours  fat emulsion (Fish Oil and Plant Based) 20% Infusion 10.4 mL/Hr (10.4 mL/Hr) IV Continuous <Continuous>  furosemide Infusion 5 mG/Hr (2.5 mL/Hr) IV Continuous <Continuous>  heparin   Injectable 5000 Unit(s) SubCutaneous every 8 hours  hydrALAZINE 50 milliGRAM(s) Oral every 8 hours  insulin regular Infusion 1 Unit(s)/Hr (1 mL/Hr) IV Continuous <Continuous>  methylPREDNISolone sodium succinate Injectable 8 milliGRAM(s) IV Push <User Schedule>  pantoprazole  Injectable 40 milliGRAM(s) IV Push daily  Parenteral Nutrition - Adult 1 Each (125 mL/Hr) TPN Continuous <Continuous>  Parenteral Nutrition - Adult 1 Each (63 mL/Hr) TPN Continuous <Continuous>  vancomycin    Solution 500 milliGRAM(s) Oral every 6 hours  vancomycin  Retention Enema 500 milliGRAM(s) Rectal every 6 hours    MEDICATIONS  (PRN):  benzocaine 15 mG/menthol 3.6 mG (Sugar-Free) Lozenge 1 Lozenge Oral every 6 hours PRN Sore Throat      LABS:   09-10 @ 06:59: Sodium --, Potassium --, Calcium --, Magnesium --, Phosphorus --, BUN --, Creatinine --, Glucose --, Alk Phos --, ALT/SGPT --, AST/SGOT --, Albumin --, Prealbumin --, Total Bilirubin --, Hemoglobin 8.6<L>, Hematocrit 27.1<L>, Ferritin --, C-Reactive Protein --, Creatine Kinase <<27>  09-10 @ 00:41: Sodium 134<L>, Potassium 4.2, Calcium 8.7, Magnesium 1.8, Phosphorus 3.9, BUN 96<H>, Creatinine 1.37<H>, Glucose 112<H>, Alk Phos 252<H>, ALT/SGPT 45, AST/SGOT 73<H>, Albumin 2.5<L>, Prealbumin --, Total Bilirubin 0.8, Hemoglobin 6.9<LL>, Hematocrit 22.3<L>, Ferritin --, C-Reactive Protein --, Creatine Kinase <<27>      Finger Sticks:  POCT Blood Glucose.: 123 mg/dL (09-10 @ 12:55)  POCT Blood Glucose.: 115 mg/dL (09-10 @ 12:21)  POCT Blood Glucose.: 184 mg/dL (09-10 @ 10:27)  POCT Blood Glucose.: 185 mg/dL (09-10 @ 09:07)  POCT Blood Glucose.: 122 mg/dL (09-10 @ 08:17)  POCT Blood Glucose.: 120 mg/dL (09-10 @ 06:53)  POCT Blood Glucose.: 117 mg/dL (09-10 @ 06:16)  POCT Blood Glucose.: 125 mg/dL (09-10 @ 04:09)  POCT Blood Glucose.: 121 mg/dL (09-10 @ 03:04)  POCT Blood Glucose.: 126 mg/dL (09-10 @ 01:54)  POCT Blood Glucose.: 117 mg/dL ( @ 22:59)  POCT Blood Glucose.: 122 mg/dL ( @ 22:05)  POCT Blood Glucose.: 129 mg/dL ( @ 20:01)  POCT Blood Glucose.: 124 mg/dL ( @ 19:05)  POCT Blood Glucose.: 141 mg/dL ( @ 17:55)  POCT Blood Glucose.: 144 mg/dL ( @ 17:03)  POCT Blood Glucose.: 159 mg/dL ( @ 16:01)  POCT Blood Glucose.: 147 mg/dL ( @ 15:12)      Skin per nursing documentation: intact  Edema: +2 generalized and David leg +3 right arm, wrist, hand and foot.     Estimated Needs: based on 75.2 Kg, with consideration for TPN  Energy: (25-30kcal/kg): 1880-2256kcal   Protein:  (1.6-1.8g protein/kg): 120-135g protein    Previous Nutrition Diagnosis: altered nutrition lab values  Nutrition Diagnosis is: defer at this time     Previous Nutrition Diagnosis: inadequate oral intake  Nutrition Diagnosis is: on going at this time.     Previous Nutrition Diagnosis: acute severe protein calorie malnutrition  Nutrition Diagnosis: remains appropriate, ongoing with pending initiation of TPN    New Nutrition Diagnosis:      Recommended Interventions:   1. TPN per TPN Team/Nutrition Assessment  2. PO diet advanced to full liquid   3. EN advanced to Glucerna 1.5 @ 20ml/[id number]. Will provide: 720kcals and 40gm protein.   4. Trend GI tolerance   5. Trend BG levels, renal indices, LFT's, electrolytes and triglycerides     Monitoring and Evaluation:   Continue to monitor nutritional intake, tolerance to diet prescription, weights, labs, skin integrity  RD remains available upon request and will follow up per protocol  Gabbi Flowers RD, CDN, [facility] Pager #[phone].

## 2020-09-10 NOTE — PROGRESS NOTE ADULT - SUBJECTIVE AND OBJECTIVE BOX
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 Patient was treated w/ prednisone and Rituximab. Tacro was discontinued and patient was also transitioned to everolimus  Admitted  6/16-6/19  for hyperglycemia.  Reported to transplant team having one done black tarry stool-  instructed to  present to [facility] for hemolytic anemia. Patient has been following with Hematology outpatient.  Denies CP  N/V diarrhea SOB. (11 Aug 2020 20:08)      Hospital course:  8/11 Admitted to [facility] with symptomatic anemia  8/13 EGD for investigation of tarry stools  8/15 SB capsule: stomach & SB lesions, likely ulcers.  8/17 EGD/push enteroscopy w/ angioectasias/erosions in small bowel. Gastropathy and esophagitis. Ulcer seen on VCE most likely scope trauma.    8/18 VSS transferred back to floor.   8/20 VS 9.0/28.4 stable Gastric biopsy results LA Grade A esophagitis.  - Acute gastritis. Biopsies taken to r/o CMV, No ulceration seen despite finding on capsule endoscopy - likely 2/2 scope  trauma that has since resolved. Pathology pending.  8/21 VSS H/H  8.1/25.7  trending down will monitor prednisone taper 30 mg today. Procardia 120 initiated today RHC biopsy Monday.   8/22 VVS; Patient reports loose stools x 2-3 days with 1 episode today.  Stool sent for C-dif and GI PCR. Abdominal X-ray revealed: dilated loops of bowel. Abdomen distended.  Patient denies N/V. GI called to re-evaluate. Continue with current medication regimen.  Awaiting for upcoming cardiac biopsy on Monday 8/24.  8/23   vss    creat up to 2.28 ,  repeating CMP,  TTE ordered  Bladder scan   8/24   cardiac bx cancelled   elev creat  to 2.74   cardio renal cslt called,    + CDIF   inc vanco to 500 q6   ID following  8/25 VSS: RHC today as per transplant team; transfuse 2 units PRBC today as per Dr. Viramontes; continue vanco for cdiff; transfer patient to CTU after cath today   8/26. Dieretic challenge with good response   8/27: Downgraded to the floor then upgraded to the CTU again for concerning of abd distention   8/28 CT ABD & Pelvis reveals pancolitis  8/30: repeat CT scan of abd, gen surgery called to re-evaluated pt.  8/31: RUE duplex negative for DVT  9/4 NGT clamped, minimal residual. NGT removed. started sips   9/6 tolerating clear liquid diet  9/8 Bronch  9/9 1 prbc   9/10 increased tube feeds to 20cc/hr and tpn decreased to 63cc/hr from 125cc/hr       Today:    REVIEW OF SYSTEMS:  Gen: No fever  EYES/ENT: No visual changes;  No vertigo or throat pain   NECK: No pain   RES:  No shortness of breath or Cough  Chest: No incisional pain  CARD: No chest pain   GI: + abdominal  distension  : No dysuria  NEURO: No weakness  SKIN: No itching, rashes     Physical Exam:  Vital Signs Last 24 Hrs  T(C): 36.9 (10 Sep 2020 20:00), Max: 36.9 (10 Sep 2020 08:00)  T(F): 98.4 (10 Sep 2020 20:00), Max: 98.4 (10 Sep 2020 08:00)  HR: 129 (10 Sep 2020 20:00) (115 - 143)  BP: --  BP(mean): --  RR: 30 (10 Sep 2020 20:00) (12 - 38)  SpO2: 100% (10 Sep 2020 20:00) (99% - 100%)  Gen:  Awake, alert   CNS: non focal 	  Neck: no JVD  RES : Diminished breath sounds B/L , crackles at bases, no wheezing                    CVS: Sinus tachycardia. Normal S1/S2  Chest: Dressing C/D/I  Abd: Soft, distended. Bowel sounds present.  Skin: No rash.  Ext: +4 pitting edema in RUE, +2 edema b/l LE, +2 pitting edema in LUE.  Lines: Right Radial [place] 9/8, Left Triple IJ intro 9/03    ============================I/O===========================   I&O's Detail    09 Sep 2020 07:01  -  10 Sep 2020 07:00  --------------------------------------------------------  IN:    fat emulsion (Fish Oil and Plant Based) 20% Infusion: 107.9 mL    furosemide Infusion: 60 mL    Glucerna 1.5: 240 mL    insulin regular Infusion: 65 mL    Packed Red Blood Cells: 300 mL    Solution: 50 mL    Solution: 50.4 mL    TPN (Total Parenteral Nutrition): 3000 mL  Total IN: 3873.3 mL    OUT:    Indwelling Catheter - Urethral: 6640 mL  Total OUT: 6640 mL    Total NET: -2766.7 mL      10 Sep 2020 07:01  -  10 Sep 2020 20:22  --------------------------------------------------------  IN:    fat emulsion (Fish Oil and Plant Based) 20% Infusion: 41.6 mL    furosemide Infusion: 32.5 mL    Glucerna 1.5: 230 mL    insulin regular Infusion: 43 mL    Oral Fluid: 237 mL    Solution: 27.3 mL    TPN (Total Parenteral Nutrition): 1377 mL  Total IN: 1988.4 mL    OUT:    Indwelling Catheter - Urethral: 3130 mL  Total OUT: 3130 mL    Total NET: -1141.6 mL        ============================ LABS =========================                        8.6    22.33 )-----------( 125      ( 10 Sep 2020 06:59 )             27.1     09-10    134<L>  |  94<L>  |  96<H>  ----------------------------<  112<H>  4.2   |  27  |  1.37<H>    Ca    8.7      10 Sep 2020 00:41  Phos  3.9     09-10  Mg     1.8     09-10    TPro  6.4  /  Alb  2.5<L>  /  TBili  0.8  /  DBili  0.4<H>  /  AST  73<H>  /  ALT  45  /  AlkPhos  252<H>  09-10    LIVER FUNCTIONS - ( 10 Sep 2020 00:41 )  Alb: 2.5 g/dL / Pro: 6.4 g/dL / ALK PHOS: 252 U/L / ALT: 45 U/L / AST: 73 U/L / GGT: x             ABG - ( 10 Sep 2020 16:19 )  pH, Arterial: 7.43  pH, Blood: x     /  pCO2: 46    /  pO2: 124   / HCO3: 30    / Base Excess: 5.6   /  SaO2: 99                  ======================Micro/Rad/Cardio=================  Culture: Reviewed   CXR: Reviewed  Echo: Reviewed  ======================================================  PAST MEDICAL & SURGICAL HISTORY:  H/O hemolytic anemia  H/O autoimmune hemolytic anemia  Knee pain, right  HLD (hyperlipidemia)  Former smoker  DVT of upper extremity (deep vein thrombosis)  Hepatitis C virus  GIB (gastrointestinal bleeding)  Ventricular fibrillation: s/p AICD  PAF (paroxysmal atrial fibrillation): on xarelto  Non-Ischemic Cardiomyopathy  SVT (Supraventricular Tachycardia)  HTN  CHF (Congestive Heart Failure)  S/P right heart catheterization: biopsy multiple  H/O heart transplant: 2/2018  Status post left hip replacement  History of Prior Ablation Treatment: for afib  AICD (Automatic Cardioverter/Defibrillator) Present: [employer] with 1 [employer] lead4/1/09- explanted and replaced with [employer] 2 leads on 9/2/09    ====================ASSESSMENT ==============  Heart transplant 2/2018 for ACC/AHA stage D NICM   Resolved GI bleed, Melena s/p EGD  Supraventricular tachycardia  Severe hypertension  Hyperlactatemia acidosis, resolved  Acute respiratory failure, resolved  Leukopenia- resolved  Acute blood loss anemia, stable   Abdominal distention  ileus  Leukocytosis  CKD Stage III  C. diff diarrhea  C. diff colitis  Klebsiella oxytoca bacteremia  Sepsis  Azotemia 2/2 CKD and Steroids   Pancolitis  Macrocytic hemolytic anemia with (+) IGg cornel     Plan:  ====================== NEUROLOGY=====================  Nonfocal, continue to monitor neuro status   Continue physical therapy- OOB to chair    ==================== RESPIRATORY======================  Persistent RLL Collapse on CXR requiring bronchoscopy on 9/08 without improvement in CXR/  Comfortable on supplemental oxygen therapy via 4L nasal cannula, SpO2 100% with minimal secretions on RLL  Encourage incentive spirometry, continue pulse ox monitoring, follow ABGs   Encourage chest PT    ====================CARDIOVASCULAR==================  Transplant team following.   Heart transplant 2/2018 for ACC/AHA stage D NICM  (NPO for abd distention) off everolimus. Off cellcept while on high dose steroids.   ASA on hold due to hx of GI bleed / ulceration.  Continue invasive hemodynamic monitoring via Arterial Line  Continue Hydralazine 50mg PO q8 for a goal MAP of 70-80    hydrALAZINE 50 milliGRAM(s) Oral every 8 hours    ===================HEMATOLOGIC/ONC ===================  Anemia s/p multiple blood transfusions.   Continue closely monitoring H&H and transfuse prn.   GI Bleeding ruled out s/p EGD, found to have ulcers.     Hx of Autoimmune hemolytic anemia.  Patient with (+) Igg Cornel,  f/u am hemolysis labs. f/u G6PD     VTE prophylaxis, heparin   Injectable 5000 Unit(s) SubCutaneous every 8 hours    ===================== RENAL =========================  Anasarca. NORMAN on CKD Stage III.   Diuresis with Lasix Infusion as per HF recommendations   Monitor BUN/Cr, I&Os, urine output via diaz    furosemide Infusion 5 mG/Hr (2.5 mL/Hr) IV Continuous <Continuous>    ==================== GASTROINTESTINAL===================  Nutrition following, continue full liquid diet with tube feeds Glucerna 1.5 at goal rate 20 cc/hr  Patient with ileus in setting of C.Diff- NGT clamped 9/4 for 4 hrs, no residual. NGT removed, stared on sips, c/w TPN for now  Monitor BMs and flatus.   CT A/P on 8/30 with Pancolitis and small volume abdominopelvic ascites, not significantly changed since 8/20/2020.  General surgery following-continue serial abdominal exams. No surgical intervention at this time.     albumin human 25% IVPB 50 milliLiter(s) IV Intermittent every 10 minutes  fat emulsion (Fish Oil and Plant Based) 20% Infusion 10.4 mL/Hr (10.4 mL/Hr) IV Continuous <Continuous>  pantoprazole  Injectable 40 milliGRAM(s) IV Push daily  Parenteral Nutrition - Adult 1 Each (125 mL/Hr) TPN Continuous <Continuous>  Parenteral Nutrition - Adult 1 Each (63 mL/Hr) TPN Continuous <Continuous>    =======================    ENDOCRINE  =====================  Stress hyperglycemia, requiring glucose control with insulin gtt, titrate as per insulin drip protocol along with hourly glucose checks.     insulin regular Infusion 1 Unit(s)/Hr (1 mL/Hr) IV Continuous <Continuous>  methylPREDNISolone sodium succinate Injectable 8 milliGRAM(s) IV Push <User Schedule>    ========================INFECTIOUS DISEASE================  BCx 8/13: (+)Klebsiella bacteremia, has since been cleared.  Clostridium difficile PCR 8/23: (+), continue with vancomycin PO and Rectal Vanco   -s/p 5days of IVIG (8/29-9/1)   -s/p eravaycline (8/31- 9/6)   -s/p IV flagyl discontinued (8/24-9/1)  WBC remains at 22 since yesterday AM  Transplant prophylaxis with Posaconazole and Atovaquone on hold due to ileus, continue Cyclosporin infusion  Dr. Lujan following, no need for additional antibiotics at this time, Monitor for now.   8/25, 9/04, 9/08 Blood cultures all Negative.     vancomycin    Solution 500 milliGRAM(s) Oral every 6 hours  vancomycin  Retention Enema 500 milliGRAM(s) Rectal every 6 hours      Patient requires continuous monitoring with bedside rhythm monitoring,arterial line,pulse oximetry,ventilator monitoring;intermittent blood gas analysis.  Care plan discussed with ICU care team.  patient remain critical; required more than usual post op care; I have spent 35 minutes providing non routine post op care.     By signing my name below, I, Breanna Newell, attest that this documentation has been prepared under the direction and in the presence of Jakob Maxwell MD.  Electronically signed: Katty Ramírez, 09-10-20 @ 20:22    I, Jakob Maxwell, personally performed the services described in this documentation. all medical record entries made by the ramuibkurt were at my direction and in my presence. 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 Patient was treated w/ prednisone and Rituximab. Tacro was discontinued and patient was also transitioned to everolimus  Admitted  6/16-6/19  for hyperglycemia.  Reported to transplant team having one done black tarry stool-  instructed to  present to [facility] for hemolytic anemia. Patient has been following with Hematology outpatient.  Denies CP  N/V diarrhea SOB. (11 Aug 2020 20:08)      Hospital course:  8/11 Admitted to [facility] with symptomatic anemia  8/13 EGD for investigation of tarry stools  8/15 SB capsule: stomach & SB lesions, likely ulcers.  8/17 EGD/push enteroscopy w/ angioectasias/erosions in small bowel. Gastropathy and esophagitis. Ulcer seen on VCE most likely scope trauma.    8/18 VSS transferred back to floor.   8/20 VS 9.0/28.4 stable Gastric biopsy results LA Grade A esophagitis.  - Acute gastritis. Biopsies taken to r/o CMV, No ulceration seen despite finding on capsule endoscopy - likely 2/2 scope  trauma that has since resolved. Pathology pending.  8/21 VSS H/H  8.1/25.7  trending down will monitor prednisone taper 30 mg today. Procardia 120 initiated today RHC biopsy Monday.   8/22 VVS; Patient reports loose stools x 2-3 days with 1 episode today.  Stool sent for C-dif and GI PCR. Abdominal X-ray revealed: dilated loops of bowel. Abdomen distended.  Patient denies N/V. GI called to re-evaluate. Continue with current medication regimen.  Awaiting for upcoming cardiac biopsy on Monday 8/24.  8/23   vss    creat up to 2.28 ,  repeating CMP,  TTE ordered  Bladder scan   8/24   cardiac bx cancelled   elev creat  to 2.74   cardio renal cslt called,    + CDIF   inc vanco to 500 q6   ID following  8/25 VSS: RHC today as per transplant team; transfuse 2 units PRBC today as per Dr. Viramontes; continue vanco for cdiff; transfer patient to CTU after cath today   8/26. Dieretic challenge with good response   8/27: Downgraded to the floor then upgraded to the CTU again for concerning of abd distention   8/28 CT ABD & Pelvis reveals pancolitis  8/30: repeat CT scan of abd, gen surgery called to re-evaluated pt.  8/31: RUE duplex negative for DVT  9/4 NGT clamped, minimal residual. NGT removed. started sips   9/6 tolerating clear liquid diet  9/8 Bronch  9/9 1 prbc   9/10 increased tube feeds to 20cc/hr and tpn decreased to 63cc/hr from 125cc/hr       Today:  Patient feels better, less abdominal distention, no abdominal pain;   breathing comfortable on nasal cannula   however his on insulin , Lasix, cyclosporin drips          REVIEW OF SYSTEMS:  Gen: No fever  EYES/ENT: No visual changes;  No vertigo or throat pain   NECK: No pain   RES:  No shortness of breath or Cough  Chest: No incisional pain  CARD: No chest pain   GI: + abdominal  distension  : No dysuria  NEURO: No weakness  SKIN: No itching, rashes     Physical Exam:  Vital Signs Last 24 Hrs  T(C): 36.9 (10 Sep 2020 20:00), Max: 36.9 (10 Sep 2020 08:00)  T(F): 98.4 (10 Sep 2020 20:00), Max: 98.4 (10 Sep 2020 08:00)  HR: 129 (10 Sep 2020 20:00) (115 - 143)  BP: --  BP(mean): --  RR: 30 (10 Sep 2020 20:00) (12 - 38)  SpO2: 100% (10 Sep 2020 20:00) (99% - 100%)  Gen:  Awake, alert   CNS: non focal 	  Neck: no JVD  RES : Diminished breath sounds B/L , crackles at bases, no wheezing                    CVS: Sinus tachycardia. Normal S1/S2  Chest:  clear  Abd: Soft, distended, no tenderness. Bowel sounds present.  Skin: No rash.  Ext: +4 pitting edema in RUE, +2 edema b/l LE, +2 pitting edema in LUE.  Lines: Right Radial [place] 9/8, Left Triple IJ intro 9/03    ============================I/O===========================   I&O's Detail    09 Sep 2020 07:01  -  10 Sep 2020 07:00  --------------------------------------------------------  IN:    fat emulsion (Fish Oil and Plant Based) 20% Infusion: 107.9 mL    furosemide Infusion: 60 mL    Glucerna 1.5: 240 mL    insulin regular Infusion: 65 mL    Packed Red Blood Cells: 300 mL    Solution: 50 mL    Solution: 50.4 mL    TPN (Total Parenteral Nutrition): 3000 mL  Total IN: 3873.3 mL    OUT:    Indwelling Catheter - Urethral: 6640 mL  Total OUT: 6640 mL    Total NET: -2766.7 mL      10 Sep 2020 07:01  -  10 Sep 2020 20:22  --------------------------------------------------------  IN:    fat emulsion (Fish Oil and Plant Based) 20% Infusion: 41.6 mL    furosemide Infusion: 32.5 mL    Glucerna 1.5: 230 mL    insulin regular Infusion: 43 mL    Oral Fluid: 237 mL    Solution: 27.3 mL    TPN (Total Parenteral Nutrition): 1377 mL  Total IN: 1988.4 mL    OUT:    Indwelling Catheter - Urethral: 3130 mL  Total OUT: 3130 mL    Total NET: -1141.6 mL        ============================ LABS =========================                        8.6    22.33 )-----------( 125      ( 10 Sep 2020 06:59 )             27.1     09-10    134<L>  |  94<L>  |  96<H>  ----------------------------<  112<H>  4.2   |  27  |  1.37<H>    Ca    8.7      10 Sep 2020 00:41  Phos  3.9     09-10  Mg     1.8     09-10    TPro  6.4  /  Alb  2.5<L>  /  TBili  0.8  /  DBili  0.4<H>  /  AST  73<H>  /  ALT  45  /  AlkPhos  252<H>  09-10    LIVER FUNCTIONS - ( 10 Sep 2020 00:41 )  Alb: 2.5 g/dL / Pro: 6.4 g/dL / ALK PHOS: 252 U/L / ALT: 45 U/L / AST: 73 U/L / GGT: x             ABG - ( 10 Sep 2020 16:19 )  pH, Arterial: 7.43  pH, Blood: x     /  pCO2: 46    /  pO2: 124   / HCO3: 30    / Base Excess: 5.6   /  SaO2: 99                  ======================Micro/Rad/Cardio=================  Culture: Reviewed   CXR: Reviewed  Echo: Reviewed  ======================================================  PAST MEDICAL & SURGICAL HISTORY:  H/O hemolytic anemia  H/O autoimmune hemolytic anemia  Knee pain, right  HLD (hyperlipidemia)  Former smoker  DVT of upper extremity (deep vein thrombosis)  Hepatitis C virus  GIB (gastrointestinal bleeding)  Ventricular fibrillation: s/p AICD  PAF (paroxysmal atrial fibrillation): on xarelto  Non-Ischemic Cardiomyopathy  SVT (Supraventricular Tachycardia)  HTN  CHF (Congestive Heart Failure)  S/P right heart catheterization: biopsy multiple  H/O heart transplant: 2/2018  Status post left hip replacement  History of Prior Ablation Treatment: for afib  AICD (Automatic Cardioverter/Defibrillator) Present: [employer] with 1 [employer] [place] lead4/1/09- explanted and replaced with [employer] 2 leads on 9/2/09    ====================ASSESSMENT ==============  Heart transplant 2/2018 for ACC/AHA stage D NICM   Resolved GI bleed, Melena s/p EGD  Supraventricular tachycardia  Severe hypertension  Hyperlactatemia acidosis, resolved  Acute respiratory failure, resolved  Leukopenia- resolved  Acute blood loss anemia, stable   Abdominal distention  ileus  Leukocytosis  CKD Stage III  C. diff diarrhea  C. diff colitis  Sepsis, Klebsiella oxytoca bacteremia  Azotemia 2/2 CKD and Steroids   Macrocytic hemolytic anemia with (+) IGg cornel     Plan:  ====================== NEUROLOGY=====================  Nonfocal, continue to monitor neuro status   Continue physical therapy- OOB to chair    ==================== RESPIRATORY======================  Persistent RLL Collapse on CXR requiring bronchoscopy on 9/08 without improvement in CXR/  Comfortable on supplemental oxygen therapy via 4L nasal cannula, SpO2 100% with minimal secretions on RLL  Encourage incentive spirometry, continue pulse ox monitoring, follow ABGs   Encourage chest PT    ====================CARDIOVASCULAR==================  Transplant team following.   Heart transplant 2/2018 for ACC/AHA stage D NICM  (NPO for abd distention) off everolimus. Off cellcept while on high dose steroids.   ASA on hold due to hx of GI bleed / ulceration.  Continue invasive hemodynamic monitoring via Arterial Line  Continue Hydralazine 50mg PO q8 for a goal MAP of 70-80    hydrALAZINE 50 milliGRAM(s) Oral every 8 hours    ===================HEMATOLOGIC/ONC ===================  Anemia s/p multiple blood transfusions.   Continue closely monitoring H&H and transfuse prn.   GI Bleeding ruled out s/p EGD, found to have ulcers.     Hx of Autoimmune hemolytic anemia.  Patient with (+) Igg Cornel,  f/u am hemolysis labs. f/u G6PD     VTE prophylaxis, heparin   Injectable 5000 Unit(s) SubCutaneous every 8 hours    ===================== RENAL =========================  Anasarca. NORMAN on CKD Stage III.   Diuresis with Lasix Infusion as per HF recommendations   Monitor BUN/Cr, I&Os, urine output via diaz    furosemide Infusion 5 mG/Hr (2.5 mL/Hr) IV Continuous <Continuous>    ==================== GASTROINTESTINAL===================  Nutrition following, continue full liquid diet with tube feeds Glucerna 1.5 at goal rate 20 cc/hr  Patient with ileus in setting of C.Diff- NGT clamped 9/4 for 4 hrs, no residual. NGT removed, stared on sips, c/w TPN for now  Monitor BMs and flatus.   CT A/P on 8/30 with Pancolitis and small volume abdominopelvic ascites, not significantly changed since 8/20/2020.  General surgery following-continue serial abdominal exams. No surgical intervention at this time.     albumin human 25% IVPB 50 milliLiter(s) IV Intermittent every 10 minutes  fat emulsion (Fish Oil and Plant Based) 20% Infusion 10.4 mL/Hr (10.4 mL/Hr) IV Continuous <Continuous>  pantoprazole  Injectable 40 milliGRAM(s) IV Push daily  Parenteral Nutrition - Adult 1 Each (125 mL/Hr) TPN Continuous <Continuous>  Parenteral Nutrition - Adult 1 Each (63 mL/Hr) TPN Continuous <Continuous>    =======================    ENDOCRINE  =====================  Stress hyperglycemia, requiring glucose control with insulin gtt, titrate as per insulin drip protocol along with hourly glucose checks.     insulin regular Infusion 1 Unit(s)/Hr (1 mL/Hr) IV Continuous <Continuous>  methylPREDNISolone sodium succinate Injectable 8 milliGRAM(s) IV Push <User Schedule>    ========================INFECTIOUS DISEASE================  BCx 8/13: (+)Klebsiella bacteremia, has since been cleared.  Clostridium difficile PCR 8/23: (+), continue with vancomycin PO and Rectal Vanco   -s/p 5days of IVIG (8/29-9/1)   -s/p eravaycline (8/31- 9/6)   -s/p IV flagyl discontinued (8/24-9/1)  WBC remains at 22 since yesterday AM  Transplant prophylaxis with Posaconazole and Atovaquone on hold due to ileus, continue Cyclosporin infusion  Dr. Lujan following, no need for additional antibiotics at this time, Monitor for now.   8/25, 9/04, 9/08 Blood cultures all Negative.     vancomycin    Solution 500 milliGRAM(s) Oral every 6 hours  vancomycin  Retention Enema 500 milliGRAM(s) Rectal every 6 hours      Patient requires continuous monitoring with bedside rhythm monitoring,arterial line,pulse oximetry,ventilator monitoring;intermittent blood gas analysis.  Care plan discussed with ICU care team.  patient remain critical; required more than usual post op care; I have spent 35 minutes providing non routine post op care.     By signing my name below, I, Breanna Newell, attest that this documentation has been prepared under the direction and in the presence of Jakob Maxwell MD.  Electronically signed: Ab Ramírez, 09-10-20 @ 20:22    I, Jakob Maxwell, personally performed the services described in this documentation. all medical record entries made by the ab were at my direction and in my presence. I have reviewed the chart and agree that the record reflects my personal performance and is accurate and complete  Electronically signed: Jakob Maxwell MD 09-10-20 @ 20:22

## 2020-09-10 NOTE — PROGRESS NOTE ADULT - SUBJECTIVE AND OBJECTIVE BOX
[facility] NUTRITION SUPPORT / TPN -- FOLLOW UP NOTE  --------------------------------------------------------------------------------    24 hour events/subjective:    no abdominal pain, still feels bloated  tolerating trickle TF w/o N/ V- held while pt was put on Bipap  No flatus/ BM   no f/c/s  no dyspnea/ sob/ palp/ cp        Diet:  Diet, Clear Liquid:   Consistent Carbohydrate No Snacks (CSTCHO)  Low Sodium  Tube Feeding Modality: Nasogastric  Glucerna 1.5 Sohail (GLUCERNA1.5RTH)  Total Volume for 24 Hours (mL): 240  Continuous  Starting Tube Feed Rate mL per Hour: 10  Until Goal Tube Feed Rate (mL per Hour): 10  Tube Feed Duration (in Hours): 24  Tube Feed Start Time: 11:10 (09-08-20 @ 13:35)      Appetite: [ x ]Poor [  ]Adequate [  ]Good  Caloric intake:  [   ]  Adequate   [   ] Inadequate    ROS: General/ GI see HPI  all other systems negative      ALLERGIES & MEDICATIONS  --------------------------------------------------------------------------------  No Known Allergies    STANDING INPATIENT MEDICATIONS    albumin human 25% IVPB 50 milliLiter(s) IV Intermittent every 10 minutes  chlorhexidine 2% Cloths 1 Application(s) Topical <User Schedule>  cycloSPORINE  (SandIMMUNE) IVPB 20 milliGRAM(s) IV Intermittent every 24 hours  fat emulsion (Fish Oil and Plant Based) 20% Infusion 8.3 mL/Hr IV Continuous <Continuous>  furosemide Infusion 5 mG/Hr IV Continuous <Continuous>  heparin   Injectable 5000 Unit(s) SubCutaneous every 8 hours  hydrALAZINE 50 milliGRAM(s) Oral every 8 hours  insulin regular Infusion 1 Unit(s)/Hr IV Continuous <Continuous>  methylPREDNISolone sodium succinate Injectable 8 milliGRAM(s) IV Push <User Schedule>  pantoprazole  Injectable 40 milliGRAM(s) IV Push daily  Parenteral Nutrition - Adult 1 Each TPN Continuous <Continuous>  vancomycin    Solution 500 milliGRAM(s) Oral every 6 hours  vancomycin  Retention Enema 500 milliGRAM(s) Rectal every 6 hours      PRN INPATIENT MEDICATION  benzocaine 15 mG/menthol 3.6 mG (Sugar-Free) Lozenge 1 Lozenge Oral every 6 hours PRN        VITALS/PHYSICAL EXAM  --------------------------------------------------------------------------------  T(C): 36.9 (09-10-20 @ 08:00), Max: 36.9 (09-10-20 @ 08:00)  HR: 143 (09-10-20 @ 09:00) (115 - 143)  BP: --  RR: 28 (09-10-20 @ 09:00) (12 - 39)  SpO2: 100% (09-10-20 @ 09:00) (97% - 100%)  Wt(kg): --        09-09-20 @ 07:01  -  09-10-20 @ 07:00  --------------------------------------------------------  IN: 3873.3 mL / OUT: 6640 mL / NET: -2766.7 mL    09-10-20 @ 07:01  -  09-10-20 @ 09:50  --------------------------------------------------------  IN: 521.2 mL / OUT: 385 mL / NET: 136.2 mL      PHYSICAL EXAM:  GEN:  guarded but stable, WD/WN/WG, (+)KO TF  HEENT: NC/AT, PERRL, mucosa moist & throat clear, no trachea midline w/ neck supple  GI: (+) BS, softly distended, nontender   MSK:  FROM x4, no deformities  VASCULAR: Equally warm, no cyanosis, clubbing,        Lt IJ TLC &  LUE PICC w/ dressing c/d/i,         BLE edema equal,  R>LUE edema- soft nontender w/o cord or erythema  Neurology; no focal deficits, weakened strength & sensation grossly intact  Psych: normal affect  Skin: warm,  moist, & w/ good turgor      LABS/ CULTURES/ RADIOLOGY:              8.6    22.33 >-----------<  125      [09-10-20 @ 06:59]              27.1     134  |  94  |  96  ----------------------------<  112      [09-10-20 @ 00:41]  4.2   |  27  |  1.37        Ca     8.7     [09-10-20 @ 00:41]      Mg     1.8     [09-10-20 @ 00:41]      Phos  3.9     [09-10-20 @ 00:41]    TPro  6.4  /  Alb  2.5  /  TBili  0.8  /  DBili  x   /  AST  73  /  ALT  45  /  AlkPhos  252  [09-10-20 @ 00:41]              [09-09-20 @ 11:46]    Blood Gas Arterial - Calcium, Ionized: 1.14 mmoL/L (09-10-20 @ 00:39)  Blood Gas Arterial - Calcium, Ionized: 1.16 mmoL/L (09-09-20 @ 00:27)      CAPILLARY BLOOD GLUCOSE  POCT Blood Glucose.: 185 mg/dL (10 Sep 2020 09:07)  POCT Blood Glucose.: 122 mg/dL (10 Sep 2020 08:17)  POCT Blood Glucose.: 120 mg/dL (10 Sep 2020 06:53)  POCT Blood Glucose.: 117 mg/dL (10 Sep 2020 06:16)  POCT Blood Glucose.: 125 mg/dL (10 Sep 2020 04:09)  POCT Blood Glucose.: 121 mg/dL (10 Sep 2020 03:04)  POCT Blood Glucose.: 126 mg/dL (10 Sep 2020 01:54)  POCT Blood Glucose.: 117 mg/dL (09 Sep 2020 22:59)  POCT Blood Glucose.: 122 mg/dL (09 Sep 2020 22:05)  POCT Blood Glucose.: 129 mg/dL (09 Sep 2020 20:01)  POCT Blood Glucose.: 124 mg/dL (09 Sep 2020 19:05)  POCT Blood Glucose.: 141 mg/dL (09 Sep 2020 17:55)  POCT Blood Glucose.: 144 mg/dL (09 Sep 2020 17:03)  POCT Blood Glucose.: 159 mg/dL (09 Sep 2020 16:01)  POCT Blood Glucose.: 147 mg/dL (09 Sep 2020 15:12)  POCT Blood Glucose.: 143 mg/dL (09 Sep 2020 14:05)  POCT Blood Glucose.: 178 mg/dL (09 Sep 2020 12:56)  POCT Blood Glucose.: 175 mg/dL (09 Sep 2020 11:45)  POCT Blood Glucose.: 184 mg/dL (09 Sep 2020 10:59)  POCT Blood Glucose.: 220 mg/dL (09 Sep 2020 10:12)    Prealbumin, Serum: 22 mg/dL (09-08-20 @ 16:22)  Prealbumin, Serum: 10 mg/dL (09-02-20 @ 08:12)  Prealbumin, Serum: 24 mg/dL (08-25-20 @ 15:10)      Triglycerides, Serum: 200 mg/dL (09-10-20 @ 00:41)  Triglycerides, Serum: 289 mg/dL (09-09-20 @ 00:29)  Triglycerides, Serum: 326 mg/dL (09-08-20 @ 11:05)  Triglycerides, Serum: 285 mg/dL (09-08-20 @ 01:52)  Triglycerides, Serum: 573 mg/dL (09-07-20 @ 00:40)    Culture - Blood (collected 09-08-20 @ 04:41)  Source: .Blood Blood-Peripheral  Preliminary Report (09-09-20 @ 05:01):    No growth to date.    < from: Xray Chest 1 View- PORTABLE-Routine (09.10.20 @ 02:48) >  Clinical information: Status post cardiothoracic surgery.    Comparison: Chest x-ray dated 9/9/2020.    Findings: Status post median sternotomy. Enteric tube, left internal jugular vein central venous catheter, and left-sided PICC line persist unchanged.    The right hemidiaphragm is elevated. The heart is enlarged. Pulmonary vascularity appears normal. There is linear atelectasis at the right lung base. The left lung is clear.    Impression: Right basilar atelectasis. [facility] NUTRITION SUPPORT / TPN -- FOLLOW UP NOTE  --------------------------------------------------------------------------------    24 hour events/subjective:    drank promote- felt full       too full to drink anything for lunch or dinner yesterday  no abdominal pain, still feels bloated  tolerating trickle TF w/o N/ V-   (+) flatus/ ? BM vs dc after Vanco enema  no f/c/s  no dyspnea/ sob/ palp/ cp        Diet:  Diet, Clear Liquid:   Consistent Carbohydrate No Snacks (CSTCHO)  Low Sodium  Tube Feeding Modality: Nasogastric  Glucerna 1.5 Sohail (GLUCERNA1.5RTH)  Total Volume for 24 Hours (mL): 240  Continuous  Starting Tube Feed Rate mL per Hour: 10  Until Goal Tube Feed Rate (mL per Hour): 10  Tube Feed Duration (in Hours): 24  Tube Feed Start Time: 11:10 (09-08-20 @ 13:35)      Appetite: [ x ]Poor [  ]Adequate [  ]Good  Caloric intake:  [  x]  Adequate   [   ] Inadequate    ROS: General/ GI see HPI  all other systems negative      ALLERGIES & MEDICATIONS  --------------------------------------------------------------------------------  No Known Allergies    STANDING INPATIENT MEDICATIONS    albumin human 25% IVPB 50 milliLiter(s) IV Intermittent every 10 minutes  chlorhexidine 2% Cloths 1 Application(s) Topical <User Schedule>  cycloSPORINE  (SandIMMUNE) IVPB 20 milliGRAM(s) IV Intermittent every 24 hours  fat emulsion (Fish Oil and Plant Based) 20% Infusion 8.3 mL/Hr IV Continuous <Continuous>  furosemide Infusion 5 mG/Hr IV Continuous <Continuous>  heparin   Injectable 5000 Unit(s) SubCutaneous every 8 hours  hydrALAZINE 50 milliGRAM(s) Oral every 8 hours  insulin regular Infusion 1 Unit(s)/Hr IV Continuous <Continuous>  methylPREDNISolone sodium succinate Injectable 8 milliGRAM(s) IV Push <User Schedule>  pantoprazole  Injectable 40 milliGRAM(s) IV Push daily  Parenteral Nutrition - Adult 1 Each TPN Continuous <Continuous>  vancomycin    Solution 500 milliGRAM(s) Oral every 6 hours  vancomycin  Retention Enema 500 milliGRAM(s) Rectal every 6 hours      PRN INPATIENT MEDICATION  benzocaine 15 mG/menthol 3.6 mG (Sugar-Free) Lozenge 1 Lozenge Oral every 6 hours PRN        VITALS/PHYSICAL EXAM  --------------------------------------------------------------------------------  T(C): 36.9 (09-10-20 @ 08:00), Max: 36.9 (09-10-20 @ 08:00)  HR: 143 (09-10-20 @ 09:00) (115 - 143)  BP: --  RR: 28 (09-10-20 @ 09:00) (12 - 39)  SpO2: 100% (09-10-20 @ 09:00) (97% - 100%)  Wt(kg): --        09-09-20 @ 07:01  -  09-10-20 @ 07:00  --------------------------------------------------------  IN: 3873.3 mL / OUT: 6640 mL / NET: -2766.7 mL    09-10-20 @ 07:01  -  09-10-20 @ 09:50  --------------------------------------------------------  IN: 521.2 mL / OUT: 385 mL / NET: 136.2 mL      PHYSICAL EXAM:  GEN:  guarded but stable, WD/WN/WG, (+)KO TF  HEENT: NC/AT, PERRL, mucosa moist & throat clear, no trachea midline w/ neck supple  GI: (+) BS, softly distended, nontender   MSK:  FROM x4, no deformities  VASCULAR: Equally warm, no cyanosis, clubbing,        Lt IJ TLC &  LUE PICC w/ dressing c/d/i,         BLE edema equal,  R>LUE edema- soft nontender w/o cord or erythema  Neurology; no focal deficits, weakened strength & sensation grossly intact  Psych: normal affect  Skin: warm,  moist, & w/ good turgor      LABS/ CULTURES/ RADIOLOGY:              8.6    22.33 >-----------<  125      [09-10-20 @ 06:59]              27.1     134  |  94  |  96  ----------------------------<  112      [09-10-20 @ 00:41]  4.2   |  27  |  1.37        Ca     8.7     [09-10-20 @ 00:41]      Mg     1.8     [09-10-20 @ 00:41]      Phos  3.9     [09-10-20 @ 00:41]    TPro  6.4  /  Alb  2.5  /  TBili  0.8  /  DBili  x   /  AST  73  /  ALT  45  /  AlkPhos  252  [09-10-20 @ 00:41]              [09-09-20 @ 11:46]    Blood Gas Arterial - Calcium, Ionized: 1.14 mmoL/L (09-10-20 @ 00:39)  Blood Gas Arterial - Calcium, Ionized: 1.16 mmoL/L (09-09-20 @ 00:27)      CAPILLARY BLOOD GLUCOSE  POCT Blood Glucose.: 185 mg/dL (10 Sep 2020 09:07)  POCT Blood Glucose.: 122 mg/dL (10 Sep 2020 08:17)  POCT Blood Glucose.: 120 mg/dL (10 Sep 2020 06:53)  POCT Blood Glucose.: 117 mg/dL (10 Sep 2020 06:16)  POCT Blood Glucose.: 125 mg/dL (10 Sep 2020 04:09)  POCT Blood Glucose.: 121 mg/dL (10 Sep 2020 03:04)  POCT Blood Glucose.: 126 mg/dL (10 Sep 2020 01:54)  POCT Blood Glucose.: 117 mg/dL (09 Sep 2020 22:59)  POCT Blood Glucose.: 122 mg/dL (09 Sep 2020 22:05)  POCT Blood Glucose.: 129 mg/dL (09 Sep 2020 20:01)  POCT Blood Glucose.: 124 mg/dL (09 Sep 2020 19:05)  POCT Blood Glucose.: 141 mg/dL (09 Sep 2020 17:55)  POCT Blood Glucose.: 144 mg/dL (09 Sep 2020 17:03)  POCT Blood Glucose.: 159 mg/dL (09 Sep 2020 16:01)  POCT Blood Glucose.: 147 mg/dL (09 Sep 2020 15:12)  POCT Blood Glucose.: 143 mg/dL (09 Sep 2020 14:05)  POCT Blood Glucose.: 178 mg/dL (09 Sep 2020 12:56)  POCT Blood Glucose.: 175 mg/dL (09 Sep 2020 11:45)  POCT Blood Glucose.: 184 mg/dL (09 Sep 2020 10:59)  POCT Blood Glucose.: 220 mg/dL (09 Sep 2020 10:12)    Prealbumin, Serum: 22 mg/dL (09-08-20 @ 16:22)  Prealbumin, Serum: 10 mg/dL (09-02-20 @ 08:12)  Prealbumin, Serum: 24 mg/dL (08-25-20 @ 15:10)      Triglycerides, Serum: 200 mg/dL (09-10-20 @ 00:41)  Triglycerides, Serum: 289 mg/dL (09-09-20 @ 00:29)  Triglycerides, Serum: 326 mg/dL (09-08-20 @ 11:05)  Triglycerides, Serum: 285 mg/dL (09-08-20 @ 01:52)  Triglycerides, Serum: 573 mg/dL (09-07-20 @ 00:40)    Culture - Blood (collected 09-08-20 @ 04:41)  Source: .Blood Blood-Peripheral  Preliminary Report (09-09-20 @ 05:01):    No growth to date.    < from: Xray Chest 1 View- PORTABLE-Routine (09.10.20 @ 02:48) >  Clinical information: Status post cardiothoracic surgery.    Comparison: Chest x-ray dated 9/9/2020.    Findings: Status post median sternotomy. Enteric tube, left internal jugular vein central venous catheter, and left-sided PICC line persist unchanged.    The right hemidiaphragm is elevated. The heart is enlarged. Pulmonary vascularity appears normal. There is linear atelectasis at the right lung base. The left lung is clear.    Impression: Right basilar atelectasis.

## 2020-09-10 NOTE — PROGRESS NOTE ADULT - PROBLEM SELECTOR PLAN 4
- Generally resolved, but fluctuates a little with diuresis.  - Continue lasix 5 mg/hr as he is responding very well, but remains with anasarca. - Generally resolved, but fluctuates a little with diuresis.  - Continue lasix 5 mg/hr as he is responding very well, but remains with anasarca. If SCr worsens, will reduce drip rate.

## 2020-09-10 NOTE — PROGRESS NOTE ADULT - SUBJECTIVE AND OBJECTIVE BOX
Follow Up:  Cdiff    Interval History/ROS: OOB to chair, reports subjective improvement, decreased abd pain, semi-formed stools    Allergies  No Known Allergies    ANTIMICROBIALS:  vancomycin    Solution 500 every 6 hours  vancomycin  Retention Enema 500 every 6 hours      OTHER MEDS:  MEDICATIONS  (STANDING):  cycloSPORINE  (SandIMMUNE) IVPB 20 every 24 hours  furosemide Infusion 5 <Continuous>  heparin   Injectable 5000 every 8 hours  hydrALAZINE 50 every 8 hours  insulin regular Infusion 1 <Continuous>  methylPREDNISolone sodium succinate Injectable 8 <User Schedule>  pantoprazole  Injectable 40 daily      Vital Signs Last 24 Hrs  T(C): 36 (10 Sep 2020 12:00), Max: 36.9 (10 Sep 2020 08:00)  T(F): 96.8 (10 Sep 2020 12:00), Max: 98.4 (10 Sep 2020 08:00)  HR: 116 (10 Sep 2020 12:00) (115 - 143)  BP: --  BP(mean): --  RR: 17 (10 Sep 2020 12:00) (12 - 39)  SpO2: 100% (10 Sep 2020 11:00) (99% - 100%)    PHYSICAL EXAM:  General: WN/WD NAD, Non-toxic  Neurology: A&Ox3, nonfocal  Respiratory: Clear to auscultation bilaterally  CV: RRR, S1S2, no murmurs, rubs or gallops - decreased amplitude heart sounds  Abdominal: Soft, Non-tender, non-distended,   Extremities: 1+ edema  Line Sites: Clear  Skin: No rash                        8.6    22.33 )-----------( 125      ( 10 Sep 2020 06:59 )             27.1   WBC Count: 22.33 (09-10 @ 06:59)  WBC Count: 22.97 (09-10 @ 00:41)  WBC Count: 17.81 (09-09 @ 00:29)  WBC Count: 19.12 (09-08 @ 01:50)  WBC Count: 19.12 (09-07 @ 00:40)  WBC Count: 17.60 (09-06 @ 06:34)  WBC Count: 15.32 (09-06 @ 05:35)      09-10    134<L>  |  94<L>  |  96<H>  ----------------------------<  112<H>  4.2   |  27  |  1.37<H>    Ca    8.7      10 Sep 2020 00:41  Phos  3.9     09-10  Mg     1.8     09-10    TPro  6.4  /  Alb  2.5<L>  /  TBili  0.8  /  DBili  0.4<H>  /  AST  73<H>  /  ALT  45  /  AlkPhos  252<H>  09-10      MICROBIOLOGY:  .Sputum Sputum  09-08-20   No growth  --    No polymorphonuclear leukocytes per low power field  Rare Squamous epithelial cells per low power field  No organisms seen      .Blood Blood-Peripheral  09-08-20   No growth to date.  --  --      .Blood Blood-Peripheral  09-04-20   No Growth Final  --  --      .Blood Triple Lumen BROWN  08-25-20   No Growth Final  --  --      .Blood Blood  08-24-20   No Growth Final  --  --      .Stool Feces  08-23-20   GI PCR Results: NOT detected      .Urine Clean Catch (Midstream)  08-14-20   No growth  --  --      .Blood Blood  08-14-20   No Growth Final  --  --      .Blood Blood-Peripheral  08-13-20   Growth in aerobic and anaerobic bottles: Klebsiella oxytoca/Raoutella  ornithinolytica  See previous culture [id number]  --    Growth in anaerobic bottle: Gram Negative Rods  Growth in aerobic bottle: Gram Negative Rods      .Blood Blood-Peripheral  08-13-20   Growth in anaerobic bottle: Klebsiella oxytoca/Raoutella ornithinolytica    .Urine Clean Catch (Midstream)  08-13-20   >=3 organisms. Probable collection contamination.  --  --      RADIOLOGY:  < from: Xray Chest 1 View- PORTABLE-Routine (09.10.20 @ 02:48) >  Comparison: Chest x-ray dated 9/9/2020.    Findings: Status post median sternotomy. Enteric tube, left internal jugular vein central venous catheter, and left-sided PICC line persist unchanged.    The right hemidiaphragm is elevated. The heart is enlarged. Pulmonary vascularity appears normal. There is linear atelectasis at the right lung base. The left lung is clear.    Impression: Right basilar atelectasis.    < end of copied text >    Rciky Pope MD; Division of Infectious Disease; Pager: [phone]; nights and weekends: [phone]

## 2020-09-10 NOTE — PROGRESS NOTE ADULT - ASSESSMENT
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 s/p EGD with esophagitis and gastritis. Developed Klebsiella oxytoca bacteremia requiring transfer to CTU. Clinically improving, transferred to [facility], finished 10-day of ceftriaxone, however, developed severe C.diff colitis on Vancomycin 500mg q6h PO and IV Flagyl. He had RHC on 8/25 and found to have high filling pressure so transferred to CTU again.    #severe C.diff colitis with NORMAN- C.diff PCR detected (8/23). Repeat CT on 8/30 without evidence of toxic megacolon.   - Leukocytosis increased - Methylprednisolone continued at 8 mg daily   - cont vancomycin 500 mg per rectum every 6 hours 8/30-->  - cont PO Vanco 500 q 6h  8/23-->  - discontinued IV flagyl (8/24-9/1)  - s/p  ERAVAYCLINE 1 mg/kg (750 mg)  every 12 hours for total 7 days (8/31- 9/6)  - completed IVIG x5 days (8/29-9/1)  - fecal transplant not available   monitor for Cdiff exacerbation     #leukocytosis - 2/2 ongoing c diff vs other infection.  modestly decreased  - Followup on 9/4 blood culture Negative 9/8 Blood culture No Growth to date  - would start abx only for positive blood cultures as abx can worsen current cdiff infection     hemolysis noted -    #Klebsiella oxytoca bacteremia: resolved   Meropenem (8/14-8/17)  Cefepime (8/17-20)  Ceftriaxone (8/14 -->24-)  - blood cultures 8/25 NG  - Followup on 9/4 blood culture Negative; 9/8 BC NGTD    #OI prophylaxis-  -Was previously receiving high dose steroids  -On IV cyclosporine now  -CMV viral load(8/17, 8/26): neg  -Valcyte and Bactrim d/c'ed on 8/17 due to leukopenia  -Galactomann<0.5, Fungitell<31  -Will consider bactrim for PCP ppx when able to tolerated PO    #Anemia- r/o GI bleeding  s/p EGD  EGD 8/17: esophagitis, acute gastritis s/p biopsy, no ulceration  On PPI 40mg daily  - cont to monitor cbc    discussed with CTU PA - lines are current, recent cultures negative, improved appearance, WBC will continue to be trended

## 2020-09-10 NOTE — PROGRESS NOTE ADULT - SUBJECTIVE AND OBJECTIVE BOX
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 Patient was treated w/ prednisone and Rituximab. Tacro was discontinued and patient was also transitioned to everolimus  Admitted  6/16-6/19  for hyperglycemia.  Reported to transplant team having one done black tarry stool-  instructed to  present to [facility] for hemolytic anemia. Patient has been following with Hematology outpatient.  Denies CP  N/V diarrhea SOB. (11 Aug 2020 20:08)      Surgery/Hospital Course:  8/11 Admitted to [facility] with symptomatic anemia  8/13 EGD for investigation of tarry stools  8/15 SB capsule: stomach & SB lesions, likely ulcers.  8/17 EGD/push enteroscopy w/ angioectasias/erosions in small bowel. Gastropathy and esophagitis. Ulcer seen on VCE most likely scope trauma.    8/18 VSS transferred back to floor.   8/20 VS 9.0/28.4 stable Gastric biopsy results LA Grade A esophagitis.  - Acute gastritis. Biopsies taken to r/o CMV, No ulceration seen despite finding on capsule endoscopy - likely 2/2 scope  trauma that has since resolved. Pathology pending.  8/21 VSS H/H  8.1/25.7  trending down will monitor prednisone taper 30 mg today. Procardia 120 initiated today RHC biopsy Monday.   8/22 VVS; Patient reports loose stools x 2-3 days with 1 episode today.  Stool sent for C-dif and GI PCR. Abdominal X-ray revealed: dilated loops of bowel. Abdomen distended.  Patient denies N/V. GI called to re-evaluate. Continue with current medication regimen.  Awaiting for upcoming cardiac biopsy on Monday 8/24.  8/23   vss    creat up to 2.28 ,  repeating CMP,  TTE ordered  Bladder scan   8/24   cardiac bx cancelled   elev creat  to 2.74   cardio renal cslt called,    + CDIF   inc vanco to 500 q6   ID following  8/25 VSS: RHC today as per transplant team; transfuse 2 units PRBC today as per Dr. Viramontes; continue vanco for cdiff; transfer patient to CTU after cath today   8/26. Diuretic challenge with good response   8/27: Downgraded to the floor then upgraded to the CTU again for concern of abd distention   8/28 CT ABD & Pelvis reveals pancolitis  8/30: repeat CT scan of abd, gen surgery called to re-evaluated pt.  8/31: RUE duplex negative for DVT  9/4 NGT clamped, minimal residual. NGT removed. started sips   9/6 tolerating clear liquid diet  9/8 Bronch    Today:    REVIEW OF SYSTEMS:  Gen: No fever  EYES/ENT: No visual changes;  No vertigo or throat pain   NECK: No pain   RES:  No shortness of breath or Cough  CARD: No chest pain   GI: No abdominal pain  : No dysuria  NEURO: No weakness  SKIN: No itching, rashes     ICU Vital Signs Last 24 Hrs  T(C): 36.9 (10 Sep 2020 08:00), Max: 36.9 (10 Sep 2020 08:00)  T(F): 98.4 (10 Sep 2020 08:00), Max: 98.4 (10 Sep 2020 08:00)  HR: 143 (10 Sep 2020 09:00) (115 - 143)  BP: --  BP(mean): --  ABP: 116/64 (10 Sep 2020 08:00) (111/57 - 149/69)  ABP(mean): 82 (10 Sep 2020 08:00) (74 - 95)  RR: 28 (10 Sep 2020 09:00) (12 - 39)  SpO2: 100% (10 Sep 2020 09:00) (97% - 100%)      Physical Exam:  Gen:  Awake, alert, no acute distress  CNS: non focal 	  Neck: no JVD  RES :  dec BS b/l, crackles at bases, no wheezing              CVS: RRR Normal S1/S2, +2 edema b/l LE  Abd: distended but soft, non-tender, no rebound tenderness. Bowel sounds present  Ext: +4 pitting edema in RUE, b/l LE's. +2 pitting edema in LUE.  Skin: No rash.    ============================I/O===========================   I&O's Detail    09 Sep 2020 07:01  -  10 Sep 2020 07:00  --------------------------------------------------------  IN:    fat emulsion (Fish Oil and Plant Based) 20% Infusion: 107.9 mL    furosemide Infusion: 60 mL    Glucerna 1.5: 240 mL    insulin regular Infusion: 65 mL    Packed Red Blood Cells: 300 mL    Solution: 50 mL    Solution: 50.4 mL    TPN (Total Parenteral Nutrition): 3000 mL  Total IN: 3873.3 mL    OUT:    Indwelling Catheter - Urethral: 6640 mL  Total OUT: 6640 mL    Total NET: -2766.7 mL      10 Sep 2020 07:01  -  10 Sep 2020 10:21  --------------------------------------------------------  IN:    furosemide Infusion: 5 mL    Glucerna 1.5: 20 mL    insulin regular Infusion: 5 mL    Oral Fluid: 237 mL    Solution: 4.2 mL    TPN (Total Parenteral Nutrition): 250 mL  Total IN: 521.2 mL    OUT:    Indwelling Catheter - Urethral: 385 mL  Total OUT: 385 mL    Total NET: 136.2 mL        ============================ LABS =========================                        8.6    22.33 )-----------( 125      ( 10 Sep 2020 06:59 )             27.1     09-10    134<L>  |  94<L>  |  96<H>  ----------------------------<  112<H>  4.2   |  27  |  1.37<H>    Ca    8.7      10 Sep 2020 00:41  Phos  3.9     09-10  Mg     1.8     09-10    TPro  6.4  /  Alb  2.5<L>  /  TBili  0.8  /  DBili  x   /  AST  73<H>  /  ALT  45  /  AlkPhos  252<H>  09-10    LIVER FUNCTIONS - ( 10 Sep 2020 00:41 )  Alb: 2.5 g/dL / Pro: 6.4 g/dL / ALK PHOS: 252 U/L / ALT: 45 U/L / AST: 73 U/L / GGT: x             ABG - ( 10 Sep 2020 00:39 )  pH, Arterial: 7.44  pH, Blood: x     /  pCO2: 45    /  pO2: 115   / HCO3: 30    / Base Excess: 5.7   /  SaO2: 99                  ======================Micro/Rad/Cardio=================  Culture: Reviewed   CXR: Reviewed  Echo:Reviewed  ======================================================  PAST MEDICAL & SURGICAL HISTORY:  H/O hemolytic anemia  H/O autoimmune hemolytic anemia  Knee pain, right  HLD (hyperlipidemia)  Former smoker  DVT of upper extremity (deep vein thrombosis)  Hepatitis C virus  GIB (gastrointestinal bleeding)  Ventricular fibrillation: s/p AICD  PAF (paroxysmal atrial fibrillation): on xarelto  Non-Ischemic Cardiomyopathy  SVT (Supraventricular Tachycardia)  HTN  CHF (Congestive Heart Failure)  S/P right heart catheterization: biopsy multiple  H/O heart transplant: 2/2018  Status post left hip replacement  History of Prior Ablation Treatment: for afib  AICD (Automatic Cardioverter/Defibrillator) Present: [employer] with 1 [employer] lead4/1/09- explanted and replaced with Medtronic 2 leads on 9/2/09    ====================ASSESSMENT ==============  Heart transplant 2/2018 for ACC/AHA stage D NICM   Resolved GI Bleed, Melena s/p EGD  Acute respiratory failure, resolved  Supraventricular tachycardia  Severe hypertension  Hyperlactatemia acidosis, resolved  Leukopenia- resolved  Acute blood loss anemia, stable   CKD Stage III  C. diff diarrhea  Klebsiella oxytoca bacteremia  Sepsis  Azotemia 2/2 CKD and Steroids   Pancolitis    Plan:  ====================== NEUROLOGY=====================  Nonfocal, continue to monitor neuro status     ==================== RESPIRATORY======================  Patient persistently has RLL collapse on CXR, bronch'ed on 9/8 without improvement in CXR.   Supplemental O2 via 2L NC with intermittent BiPAP.  Encourage incentive spirometry, continue pulse ox monitoring, follow ABGs.   Encourage chest PT, PT, OOB to chair    ====================CARDIOVASCULAR==================  Transplant team following.   Heart transplant 2/2018 for ACC/AHA stage D NICM  (NPO for abd distention) off everolimus. Off cellcept while on high dose steroids.   Immunosuppression as per transplant team   Continue hemodynamic monitoring.   ASA on hold for hx of GI bleed / ulceration.   Hydralazine for MAP 70-80    hydrALAZINE 50 milliGRAM(s) Oral every 8 hours  methylPREDNISolone sodium succinate Injectable 8 milliGRAM(s) IV Push <User Schedule>    ===================HEMATOLOGIC/ONC ===================  Anemia s/p multiple blood transfusions.   Continue closely monitoring H&H and transfuse prn.   GI Bleeding ruled out s/p EGD, found to have ulcers. F/u heme recs for hx of autoimmune hemolytic anemia.      VTE prophylaxis, heparin   Injectable 5000 Unit(s) SubCutaneous every 8 hours    ===================== RENAL =========================  NORMAN on CKD stage 3, Anasarca. Diuresis with Lasix gtt   Continue monitoring urine output, I&OS, BUN/Cr     furosemide Infusion 5 mG/Hr (2.5 mL/Hr) IV Continuous <Continuous>    ==================== GASTROINTESTINAL===================  Nutrition following, continue clear liquid diet   Ileus present in setting of Cdiff.- NGT clamped 9/4 for 4 hrs, no residual. NGT removed, stared on sips, c/w TPN for now.  CT A/P on 8/30 with Pancolitis and small volume abdominopelvic ascites, not significantly changed since 8/20/2020.  General surgery following-continue serial abdominal exams. No surgical intervention at this time.     albumin human 25% IVPB 50 milliLiter(s) IV Intermittent every 10 minutes  fat emulsion (Fish Oil and Plant Based) 20% Infusion 8.3 mL/Hr (8.3 mL/Hr) IV Continuous <Continuous>  GI prophylaxis, pantoprazole  Injectable 40 milliGRAM(s) IV Push daily  Parenteral Nutrition - Adult 1 Each (125 mL/Hr) TPN Continuous <Continuous>    =======================    ENDOCRINE  =====================  Glucose control with insulin drip for stress hyperglycemia     insulin regular Infusion 1 Unit(s)/Hr (1 mL/Hr) IV Continuous <Continuous>    ========================INFECTIOUS DISEASE================  Klebsiella bacteremia from 8/13 which has since cleared  Clostridium difficile PCR positive, continue with vancomycin PO and Rectal Vanco   Transplant prophylaxis with Posaconazole and Atovaquone on hold due to ileus   Dr. Lujan following, no need for additional abx at this time. Monitor for now.     vancomycin    Solution 500 milliGRAM(s) Oral every 6 hours  vancomycin  Retention Enema 500 milliGRAM(s) Rectal every 6 hours      Patient requires continuous monitoring with bedside rhythm monitoring, pulse ox monitoring, and intermittent blood gas analysis. Care plan discussed with ICU care team. Patient remained critical and at risk for life threatening decompensation.     By signing my name below, I, Ronit Joe, attest that this documentation has been prepared under the direction and in the presence of SHELLY Goode   Electronically signed: Katty Gaffney, 09-10-20 @ 10:21    I, Li Ohara, personally performed the services described in this documentation. all medical record entries made by the scribe were at my direction and in my presence. 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 Patient was treated w/ prednisone and Rituximab. Tacro was discontinued and patient was also transitioned to everolimus  Admitted  6/16-6/19  for hyperglycemia.  Reported to transplant team having one done black tarry stool-  instructed to  present to [facility] for hemolytic anemia. Patient has been following with Hematology outpatient.  Denies CP  N/V diarrhea SOB. (11 Aug 2020 20:08)      Surgery/Hospital Course:  8/11 Admitted to [facility] with symptomatic anemia  8/13 EGD for investigation of tarry stools  8/15 SB capsule: stomach & SB lesions, likely ulcers.  8/17 EGD/push enteroscopy w/ angioectasias/erosions in small bowel. Gastropathy and esophagitis. Ulcer seen on VCE most likely scope trauma.    8/18 VSS transferred back to floor.   8/20 VS 9.0/28.4 stable Gastric biopsy results LA Grade A esophagitis.  - Acute gastritis. Biopsies taken to r/o CMV, No ulceration seen despite finding on capsule endoscopy - likely 2/2 scope  trauma that has since resolved. Pathology pending.  8/21 VSS H/H  8.1/25.7  trending down will monitor prednisone taper 30 mg today. Procardia 120 initiated today RHC biopsy Monday.   8/22 VVS; Patient reports loose stools x 2-3 days with 1 episode today.  Stool sent for C-dif and GI PCR. Abdominal X-ray revealed: dilated loops of bowel. Abdomen distended.  Patient denies N/V. GI called to re-evaluate. Continue with current medication regimen.  Awaiting for upcoming cardiac biopsy on Monday 8/24.  8/23   vss    creat up to 2.28 ,  repeating CMP,  TTE ordered  Bladder scan   8/24   cardiac bx cancelled   elev creat  to 2.74   cardio renal cslt called,    + CDIF   inc vanco to 500 q6   ID following  8/25 VSS: RHC today as per transplant team; transfuse 2 units PRBC today as per Dr. Viramontes; continue vanco for cdiff; transfer patient to CTU after cath today   8/26. Diuretic challenge with good response   8/27: Downgraded to the floor then upgraded to the CTU again for concern of abd distention   8/28 CT ABD & Pelvis reveals pancolitis  8/30: repeat CT scan of abd, gen surgery called to re-evaluated pt.  8/31: RUE duplex negative for DVT  9/4 NGT clamped, minimal residual. NGT removed. started sips   9/6 tolerating clear liquid diet  9/8 Bronch  9/9 1 prbc   9/10 increased tube feeds to 20cc/hr and tpn decreased to 63cc/hr from 125cc/hr       REVIEW OF SYSTEMS:  Gen: No fever  EYES/ENT: No visual changes;  No vertigo or throat pain   NECK: No pain   RES:  No shortness of breath or Cough  CARD: No chest pain   GI: No abdominal pain  : No dysuria  NEURO: No weakness  SKIN: No itching, rashes     ICU Vital Signs Last 24 Hrs  T(C): 36.9 (10 Sep 2020 08:00), Max: 36.9 (10 Sep 2020 08:00)  T(F): 98.4 (10 Sep 2020 08:00), Max: 98.4 (10 Sep 2020 08:00)  HR: 143 (10 Sep 2020 09:00) (115 - 143)  BP: --  BP(mean): --  ABP: 116/64 (10 Sep 2020 08:00) (111/57 - 149/69)  ABP(mean): 82 (10 Sep 2020 08:00) (74 - 95)  RR: 28 (10 Sep 2020 09:00) (12 - 39)  SpO2: 100% (10 Sep 2020 09:00) (97% - 100%)      Physical Exam:  Gen:  Awake, alert, no acute distress  CNS: non focal 	  Neck: no JVD  RES :  dec BS b/l, crackles at bases, no wheezing              CVS: RRR Normal S1/S2, +2 edema b/l LE  Abd: distended but soft, non-tender, no rebound tenderness. Bowel sounds present  Ext: +4 pitting edema in RUE, b/l LE's. +2 pitting edema in LUE.  Skin: No rash.    ============================I/O===========================   I&O's Detail    09 Sep 2020 07:01  -  10 Sep 2020 07:00  --------------------------------------------------------  IN:    fat emulsion (Fish Oil and Plant Based) 20% Infusion: 107.9 mL    furosemide Infusion: 60 mL    Glucerna 1.5: 240 mL    insulin regular Infusion: 65 mL    Packed Red Blood Cells: 300 mL    Solution: 50 mL    Solution: 50.4 mL    TPN (Total Parenteral Nutrition): 3000 mL  Total IN: 3873.3 mL    OUT:    Indwelling Catheter - Urethral: 6640 mL  Total OUT: 6640 mL    Total NET: -2766.7 mL      10 Sep 2020 07:01  -  10 Sep 2020 10:21  --------------------------------------------------------  IN:    furosemide Infusion: 5 mL    Glucerna 1.5: 20 mL    insulin regular Infusion: 5 mL    Oral Fluid: 237 mL    Solution: 4.2 mL    TPN (Total Parenteral Nutrition): 250 mL  Total IN: 521.2 mL    OUT:    Indwelling Catheter - Urethral: 385 mL  Total OUT: 385 mL    Total NET: 136.2 mL        ============================ LABS =========================                        8.6    22.33 )-----------( 125      ( 10 Sep 2020 06:59 )             27.1     09-10    134<L>  |  94<L>  |  96<H>  ----------------------------<  112<H>  4.2   |  27  |  1.37<H>    Ca    8.7      10 Sep 2020 00:41  Phos  3.9     09-10  Mg     1.8     09-10    TPro  6.4  /  Alb  2.5<L>  /  TBili  0.8  /  DBili  x   /  AST  73<H>  /  ALT  45  /  AlkPhos  252<H>  09-10    LIVER FUNCTIONS - ( 10 Sep 2020 00:41 )  Alb: 2.5 g/dL / Pro: 6.4 g/dL / ALK PHOS: 252 U/L / ALT: 45 U/L / AST: 73 U/L / GGT: x             ABG - ( 10 Sep 2020 00:39 )  pH, Arterial: 7.44  pH, Blood: x     /  pCO2: 45    /  pO2: 115   / HCO3: 30    / Base Excess: 5.7   /  SaO2: 99                  ======================Micro/Rad/Cardio=================  Culture: Reviewed   CXR: Reviewed  Echo:Reviewed  ======================================================  PAST MEDICAL & SURGICAL HISTORY:  H/O hemolytic anemia  H/O autoimmune hemolytic anemia  Knee pain, right  HLD (hyperlipidemia)  Former smoker  DVT of upper extremity (deep vein thrombosis)  Hepatitis C virus  GIB (gastrointestinal bleeding)  Ventricular fibrillation: s/p AICD  PAF (paroxysmal atrial fibrillation): on xarelto  Non-Ischemic Cardiomyopathy  SVT (Supraventricular Tachycardia)  HTN  CHF (Congestive Heart Failure)  S/P right heart catheterization: biopsy multiple  H/O heart transplant: 2/2018  Status post left hip replacement  History of Prior Ablation Treatment: for afib  AICD (Automatic Cardioverter/Defibrillator) Present: [employer] with 1 [employer] lead4/1/09- explanted and replaced with Medtronic 2 leads on 9/2/09    ====================ASSESSMENT ==============  Heart transplant 2/2018 for ACC/AHA stage D NICM   Resolved GI Bleed, Melena s/p EGD  Acute respiratory failure, resolved  Supraventricular tachycardia  Severe hypertension  Hyperlactatemia acidosis, resolved  Leukopenia- resolved  Acute blood loss anemia, stable   CKD Stage III  C. diff diarrhea  Klebsiella oxytoca bacteremia  Sepsis  Azotemia 2/2 CKD and Steroids   Pancolitis  Macrocytic hemolytic anemia with (+) IGg cornel     Plan:  ====================== NEUROLOGY=====================  Nonfocal, continue to monitor neuro status     ==================== RESPIRATORY======================  Patient persistently has RLL collapse on CXR, bronch'ed on 9/8 without improvement in CXR.   Supplemental O2 via 2L NC s/p awake bronch on 9/8 (Minimal secretions on RLL, indicated for R hemidiaphragm on CXR)    Encourage incentive spirometry, continue pulse ox monitoring, follow ABGs.   Encourage chest PT, PT, OOB to chair    ====================CARDIOVASCULAR==================  Transplant team following.   Heart transplant 2/2018 for ACC/AHA stage D NICM  (NPO for abd distention) off everolimus. Off cellcept while on high dose steroids.   Immunosuppression as per transplant team, c/w 24hr cyclosporine with 8am Level. tacro on hold    Continue hemodynamic monitoring.   ASA on hold for hx of GI bleed / ulceration.   Hydralazine for MAP 70-80    hydrALAZINE 50 milliGRAM(s) Oral every 8 hours  methylPREDNISolone sodium succinate Injectable 8 milliGRAM(s) IV Push <User Schedule>    ===================HEMATOLOGIC/ONC ===================  Anemia s/p multiple blood transfusions.   Continue closely monitoring H&H and transfuse prn.   GI Bleeding ruled out s/p EGD, found to have ulcers.   F/u heme recs for hx of autoimmune hemolytic anemia. Pt with (+) Igg Cornel. f/u am hemolysis labs. f/u G6PD     VTE prophylaxis, heparin   Injectable 5000 Unit(s) SubCutaneous every 8 hours    ===================== RENAL =========================  NORMAN on CKD stage 3, Anasarca. Diuresis with Lasix gtt @ 5 continue per HF recs) as pt is volume overloaded   Continue monitoring urine output, I&OS, BUN/Cr     furosemide Infusion 5 mG/Hr (2.5 mL/Hr) IV Continuous <Continuous>    ==================== GASTROINTESTINAL===================  Nutrition following, continue clear liquid diet   Ileus present in setting of Cdiff.- NGT clamped 9/4 for 4 hrs, no residual. NGT removed, stared on sips, c/w TPN for now  TPN to be cut in half today. Monitor need as there is PO intake and tube feeds increase to goal.   Monitor BMs and flatus.   CT A/P on 8/30 with Pancolitis and small volume abdominopelvic ascites, not significantly changed since 8/20/2020.  General surgery following-continue serial abdominal exams. No surgical intervention at this time.     albumin human 25% IVPB 50 milliLiter(s) IV Intermittent every 10 minutes  fat emulsion (Fish Oil and Plant Based) 20% Infusion 8.3 mL/Hr (8.3 mL/Hr) IV Continuous <Continuous>  GI prophylaxis, pantoprazole  Injectable 40 milliGRAM(s) IV Push daily  Parenteral Nutrition - Adult 1 Each (125 mL/Hr) TPN Continuous <Continuous>    =======================    ENDOCRINE  =====================  Glucose control with insulin drip for stress hyperglycemia     insulin regular Infusion 1 Unit(s)/Hr (1 mL/Hr) IV Continuous <Continuous>    ========================INFECTIOUS DISEASE================  Klebsiella bacteremia from 8/13 which has since cleared  Clostridium difficile PCR positive, continue with vancomycin PO and Rectal Vanco    -s/p 5days of IVIG   -s/p eravaycline    -s/p IV flagyl discontinued   MONITOR WBC (22 from 17 today). Change TLC?   Transplant prophylaxis with Posaconazole and Atovaquone on hold due to ileus   Dr. Lujan following, no need for additional abx at this time. Monitor for now.     vancomycin    Solution 500 milliGRAM(s) Oral every 6 hours  vancomycin  Retention Enema 500 milliGRAM(s) Rectal every 6 hours      Patient requires continuous monitoring with bedside rhythm monitoring, pulse ox monitoring, and intermittent blood gas analysis. Care plan discussed with ICU care team. Patient remained critical and at risk for life threatening decompensation.     By signing my name below, IRonit, attest that this documentation has been prepared under the direction and in the presence of SHELLY Goode   Electronically signed: Ab Gaffney, 09-10-20 @ 10:21    I, Li Ohara, personally performed the services described in this documentation. all medical record entries made by the ab were at my direction and in my presence. I have reviewed the chart and agree that the record reflects my personal performance and is accurate and complete  Electronically signed: SHELLY Goode

## 2020-09-10 NOTE — PROGRESS NOTE ADULT - PROBLEM SELECTOR PLAN 5
- s/p 1u pRBCs overnight. Likely hemolyzing again.  - Appreciate Heme input. Given severity of C.diff infection, unable to give higher dose steroids at this time, but will reassess as he improves.  - Case reviewed with Blood Bank Director, Dr. Tomlinson. Eval negative, but requested a repeat of hemolysis labs again today.  - Follow-up G6PD to look for deficiency as contributor to the hemolytic anemia.

## 2020-09-10 NOTE — PROGRESS NOTE ADULT - SUBJECTIVE AND OBJECTIVE BOX
Subjective:    Medications:  albumin human 25% IVPB 50 milliLiter(s) IV Intermittent every 10 minutes  benzocaine 15 mG/menthol 3.6 mG (Sugar-Free) Lozenge 1 Lozenge Oral every 6 hours PRN  chlorhexidine 2% Cloths 1 Application(s) Topical <User Schedule>  cycloSPORINE  (SandIMMUNE) IVPB 20 milliGRAM(s) IV Intermittent every 24 hours  fat emulsion (Fish Oil and Plant Based) 20% Infusion 8.3 mL/Hr IV Continuous <Continuous>  furosemide Infusion 5 mG/Hr IV Continuous <Continuous>  heparin   Injectable 5000 Unit(s) SubCutaneous every 8 hours  hydrALAZINE 50 milliGRAM(s) Oral every 8 hours  insulin regular Infusion 1 Unit(s)/Hr IV Continuous <Continuous>  methylPREDNISolone sodium succinate Injectable 8 milliGRAM(s) IV Push <User Schedule>  pantoprazole  Injectable 40 milliGRAM(s) IV Push daily  Parenteral Nutrition - Adult 1 Each TPN Continuous <Continuous>  vancomycin    Solution 500 milliGRAM(s) Oral every 6 hours  vancomycin  Retention Enema 500 milliGRAM(s) Rectal every 6 hours    Vitals:  T(C): 36.9 (09-10-20 @ 08:00), Max: 36.9 (09-10-20 @ 08:00)  HR: 119 (09-10-20 @ 08:07) (115 - 134)  BP: --  BP(mean): --  ABP: 116/64 (09-10-20 @ 08:00) (111/57 - 149/69)  ABP(mean): 82 (09-10-20 @ 08:00) (74 - 95)  RR: 21 (09-10-20 @ 08:00) (12 - 39)  SpO2: 100% (09-10-20 @ 08:07) (97% - 100%)  CVP(cm H2O): --  CO: --  CI: --  PA: --  PA(mean): --  PCWP: --  SVR: --  PVR: --      I&O's Summary    09 Sep 2020 07:01  -  10 Sep 2020 07:00  --------------------------------------------------------  IN: 3873.3 mL / OUT: 6640 mL / NET: -2766.7 mL    10 Sep 2020 07:01  -  10 Sep 2020 09:17  --------------------------------------------------------  IN: 141.6 mL / OUT: 235 mL / NET: -93.4 mL        Physical Exam:  General: No distress. Comfortable.  HEENT: EOM intact.  Neck: Neck supple. JVP mildly elevated. No masses  Chest: Clear to auscultation bilaterally.  CV: Tachycardic, regular. Normal S1 and S2. III/VI combined systolic and diastolic murmur heard across precordium. No rubs or gallops.  Abdomen: Distended, but compressible. Non-tender. Prominent bowel sounds.   Extremities: RUE>>LUE edema, diffuse anasarca  Skin: No rashes  Neurology: Alert and oriented. Sensation intact  Psych: Affect normal      Labs:                        8.6    22.33 )-----------( 125      ( 10 Sep 2020 06:59 )             27.1     09-10    134<L>  |  94<L>  |  96<H>  ----------------------------<  112<H>  4.2   |  27  |  1.37<H>    Ca    8.7      10 Sep 2020 00:41  Phos  3.9     09-10  Mg     1.8     09-10    TPro  6.4  /  Alb  2.5<L>  /  TBili  0.8  /  DBili  x   /  AST  73<H>  /  ALT  45  /  AlkPhos  252<H>  09-10 Subjective: OOB to chair, doing better each day. No pain. TF rate increased to 10 mL/hr today.    Medications:  albumin human 25% IVPB 50 milliLiter(s) IV Intermittent every 10 minutes  benzocaine 15 mG/menthol 3.6 mG (Sugar-Free) Lozenge 1 Lozenge Oral every 6 hours PRN  chlorhexidine 2% Cloths 1 Application(s) Topical <User Schedule>  cycloSPORINE  (SandIMMUNE) IVPB 20 milliGRAM(s) IV Intermittent every 24 hours  fat emulsion (Fish Oil and Plant Based) 20% Infusion 8.3 mL/Hr IV Continuous <Continuous>  furosemide Infusion 5 mG/Hr IV Continuous <Continuous>  heparin   Injectable 5000 Unit(s) SubCutaneous every 8 hours  hydrALAZINE 50 milliGRAM(s) Oral every 8 hours  insulin regular Infusion 1 Unit(s)/Hr IV Continuous <Continuous>  methylPREDNISolone sodium succinate Injectable 8 milliGRAM(s) IV Push <User Schedule>  pantoprazole  Injectable 40 milliGRAM(s) IV Push daily  Parenteral Nutrition - Adult 1 Each TPN Continuous <Continuous>  vancomycin    Solution 500 milliGRAM(s) Oral every 6 hours  vancomycin  Retention Enema 500 milliGRAM(s) Rectal every 6 hours    Vitals:  T(C): 36.9 (09-10-20 @ 08:00), Max: 36.9 (09-10-20 @ 08:00)  HR: 119 (09-10-20 @ 08:07) (115 - 134)  ABP: 116/64 (09-10-20 @ 08:00) (111/57 - 149/69)  ABP(mean): 82 (09-10-20 @ 08:00) (74 - 95)  RR: 21 (09-10-20 @ 08:00) (12 - 39)  SpO2: 100% (09-10-20 @ 08:07) (97% - 100%)        I&O's Summary    09 Sep 2020 07:01  -  10 Sep 2020 07:00  --------------------------------------------------------  IN: 3873.3 mL / OUT: 6640 mL / NET: -2766.7 mL    10 Sep 2020 07:01  -  10 Sep 2020 09:17  --------------------------------------------------------  IN: 141.6 mL / OUT: 235 mL / NET: -93.4 mL        Physical Exam:  General: No distress. Comfortable.  HEENT: EOM intact.  Neck: Neck supple. JVP mildly elevated. No masses  Chest: Clear to auscultation bilaterally.  CV: Tachycardic, regular. Normal S1 and S2. III/VI combined systolic and diastolic murmur heard across precordium. No rubs or gallops.  Abdomen: Distended, but compressible. Non-tender. Prominent bowel sounds.   Extremities: RUE>>LUE edema, diffuse anasarca  Skin: No rashes  Neurology: Alert and oriented. Sensation intact  Psych: Affect normal      Labs:                        8.6    22.33 )-----------( 125      ( 10 Sep 2020 06:59 )             27.1     09-10    134<L>  |  94<L>  |  96<H>  ----------------------------<  112<H>  4.2   |  27  |  1.37<H>    Ca    8.7      10 Sep 2020 00:41  Phos  3.9     09-10  Mg     1.8     09-10    TPro  6.4  /  Alb  2.5<L>  /  TBili  0.8  /  DBili  x   /  AST  73<H>  /  ALT  45  /  AlkPhos  252<H>  09-10

## 2020-09-10 NOTE — PROGRESS NOTE ADULT - PROBLEM SELECTOR PLAN 2
- We will adjust IV cyclosporine as necessary. Currently at 20 mg/day. Goal level 100 +/- 20.   - Eventual plan to try switch back to tacrolimus as monotherapy  - Everolimus level was 5.3 on 8/31 and has been discontinued.  Repeat level sent 9/8 and pending.  - Continue current methylprednisolone dose (equivalent to 10 mg daily of prednisone). This was decreased on 9/5.   - Will need to resume statin and aspirin when stable. - We will adjust IV cyclosporine as necessary. Currently at 20 mg/day. Goal level 100 +/- 20. Level today 110. No changes.  - Eventual plan to try switch back to tacrolimus as monotherapy  - Everolimus level was 5.3 on 8/31 and has been discontinued.  Repeat level sent 9/8 and pending.  - Continue current methylprednisolone dose (equivalent to 10 mg daily of prednisone). This was decreased on 9/5.   - Will need to resume statin and aspirin when stable.

## 2020-09-10 NOTE — PROGRESS NOTE ADULT - ASSESSMENT
A/P:  70y M with history of heart transplant in 2/2018 admitted with autoimmune hemolytic anemia and dark stools requiring transfusion. Found to have c diff and abdominal distension on 8/23.  Pt developed Ileus w/ NGT placement and was made NPO.  Ileus resolving and TF & clears slowly being reintroduced.    TPN consulted to assist w/ management in pt who was NPO for a prolonged period of time now w/ inadequate PO nutrition  Pt w/ Acute Severe Protein-Calorie Malnutrition  Pt continues on TPN:  w/ 135g AA, 140g Dextrose, & 25g Lipids in 1.5L total volume        and Glucerna 1.5 TF at 10cc/h        considering increasing TF from 1- to 20cc/h  Pt tolerating only 1 promote/ clears at breakfast            - HyperTg- improving will increase Lipids from 20 to 25g               continue to monitor daily     - Hyperglycemia- increase Insulin in TPN from 40 to 50U                consider stopping Insulin gtt vs FS q6H w/ ISS coverage                      to cover Enteral (TF) nutrition              pt on steriods  - Strict Intake and Output- fluid overload              HyperNa - improving- now HypoNa decrease to 1.5L volume              NaPhos 30mMol             Pt on Lasix gtt- consider metolazone 10mg QD instead of lasix             Sheldon resolving  - HypoMg- improving 12mEq             Maintaining Mg levels will assist GI motility  - HypoPhos improving w/ 30mMol NaPhos  - Elevated Alk Phos & Lfts- continue to monitor  - Weights as per protocol  - Monitor  CMP, Mg, iCa, & Phos daily  - Check Prealbumin check weekly  - Central line w/ designated port for TPN, maintenance as per protocol  - Continue monitoring as per CTU w/ Surgery, will follow with you, d/w SHELLY Barragan-C  [phone]) [phone]  d/w Ayan Hansen & Marciano

## 2020-09-11 ENCOUNTER — OUTPATIENT (OUTPATIENT)
Dept: OUTPATIENT SERVICES | Facility: HOSPITAL | Age: 70
LOS: 1 days | Discharge: HOME | End: 2020-09-11

## 2020-09-11 DIAGNOSIS — D64.9 ANEMIA, UNSPECIFIED: ICD-10-CM

## 2020-09-11 DIAGNOSIS — Z94.1 HEART TRANSPLANT STATUS: Chronic | ICD-10-CM

## 2020-09-11 DIAGNOSIS — Z98.890 OTHER SPECIFIED POSTPROCEDURAL STATES: Chronic | ICD-10-CM

## 2020-09-11 LAB
ALBUMIN SERPL ELPH-MCNC: 2.7 G/DL — LOW (ref 3.3–5)
ALP SERPL-CCNC: 277 U/L — HIGH (ref 40–120)
ALT FLD-CCNC: 45 U/L — SIGNIFICANT CHANGE UP (ref 10–45)
ANION GAP SERPL CALC-SCNC: 12 MMOL/L — SIGNIFICANT CHANGE UP (ref 5–17)
AST SERPL-CCNC: 89 U/L — HIGH (ref 10–40)
BILIRUB DIRECT SERPL-MCNC: 0.4 MG/DL — HIGH (ref 0–0.2)
BILIRUB SERPL-MCNC: 0.8 MG/DL — SIGNIFICANT CHANGE UP (ref 0.2–1.2)
BUN SERPL-MCNC: 92 MG/DL — HIGH (ref 7–23)
CALCIUM SERPL-MCNC: 8.8 MG/DL — SIGNIFICANT CHANGE UP (ref 8.4–10.5)
CHLORIDE SERPL-SCNC: 91 MMOL/L — LOW (ref 96–108)
CO2 SERPL-SCNC: 29 MMOL/L — SIGNIFICANT CHANGE UP (ref 22–31)
CREAT SERPL-MCNC: 1.27 MG/DL — SIGNIFICANT CHANGE UP (ref 0.5–1.3)
CYCLOSPORINE SER-MCNC: 96 NG/ML — LOW (ref 150–400)
EVEROLIMUS, WHOLE BLOOD RESULT: 1.3 NG/ML — LOW (ref 3–?)
GAS PNL BLDA: SIGNIFICANT CHANGE UP
GAS PNL BLDA: SIGNIFICANT CHANGE UP
GLUCOSE BLDC GLUCOMTR-MCNC: 103 MG/DL — HIGH (ref 70–99)
GLUCOSE BLDC GLUCOMTR-MCNC: 107 MG/DL — HIGH (ref 70–99)
GLUCOSE BLDC GLUCOMTR-MCNC: 112 MG/DL — HIGH (ref 70–99)
GLUCOSE BLDC GLUCOMTR-MCNC: 118 MG/DL — HIGH (ref 70–99)
GLUCOSE BLDC GLUCOMTR-MCNC: 118 MG/DL — HIGH (ref 70–99)
GLUCOSE BLDC GLUCOMTR-MCNC: 119 MG/DL — HIGH (ref 70–99)
GLUCOSE BLDC GLUCOMTR-MCNC: 121 MG/DL — HIGH (ref 70–99)
GLUCOSE BLDC GLUCOMTR-MCNC: 121 MG/DL — HIGH (ref 70–99)
GLUCOSE BLDC GLUCOMTR-MCNC: 125 MG/DL — HIGH (ref 70–99)
GLUCOSE BLDC GLUCOMTR-MCNC: 129 MG/DL — HIGH (ref 70–99)
GLUCOSE BLDC GLUCOMTR-MCNC: 129 MG/DL — HIGH (ref 70–99)
GLUCOSE BLDC GLUCOMTR-MCNC: 134 MG/DL — HIGH (ref 70–99)
GLUCOSE BLDC GLUCOMTR-MCNC: 139 MG/DL — HIGH (ref 70–99)
GLUCOSE BLDC GLUCOMTR-MCNC: 141 MG/DL — HIGH (ref 70–99)
GLUCOSE BLDC GLUCOMTR-MCNC: 142 MG/DL — HIGH (ref 70–99)
GLUCOSE BLDC GLUCOMTR-MCNC: 148 MG/DL — HIGH (ref 70–99)
GLUCOSE BLDC GLUCOMTR-MCNC: 152 MG/DL — HIGH (ref 70–99)
GLUCOSE BLDC GLUCOMTR-MCNC: 182 MG/DL — HIGH (ref 70–99)
GLUCOSE BLDC GLUCOMTR-MCNC: 96 MG/DL — SIGNIFICANT CHANGE UP (ref 70–99)
GLUCOSE BLDC GLUCOMTR-MCNC: 99 MG/DL — SIGNIFICANT CHANGE UP (ref 70–99)
GLUCOSE SERPL-MCNC: 101 MG/DL — HIGH (ref 70–99)
HAPTOGLOB SERPL-MCNC: 41 MG/DL — SIGNIFICANT CHANGE UP (ref 34–200)
HCT VFR BLD CALC: 25.7 % — LOW (ref 39–50)
HGB BLD-MCNC: 8.2 G/DL — LOW (ref 13–17)
LDH SERPL L TO P-CCNC: 811 U/L — HIGH (ref 50–242)
MAGNESIUM SERPL-MCNC: 1.9 MG/DL — SIGNIFICANT CHANGE UP (ref 1.6–2.6)
MCHC RBC-ENTMCNC: 31.3 PG — SIGNIFICANT CHANGE UP (ref 27–34)
MCHC RBC-ENTMCNC: 31.9 GM/DL — LOW (ref 32–36)
MCV RBC AUTO: 98.1 FL — SIGNIFICANT CHANGE UP (ref 80–100)
NRBC # BLD: 8 /100 WBCS — HIGH (ref 0–0)
PHOSPHATE SERPL-MCNC: 3.6 MG/DL — SIGNIFICANT CHANGE UP (ref 2.5–4.5)
PLATELET # BLD AUTO: 124 K/UL — LOW (ref 150–400)
POTASSIUM SERPL-MCNC: 4 MMOL/L — SIGNIFICANT CHANGE UP (ref 3.5–5.3)
POTASSIUM SERPL-SCNC: 4 MMOL/L — SIGNIFICANT CHANGE UP (ref 3.5–5.3)
PROT SERPL-MCNC: 6.6 G/DL — SIGNIFICANT CHANGE UP (ref 6–8.3)
RBC # BLD: 2.62 M/UL — LOW (ref 4.2–5.8)
RBC # BLD: 2.62 M/UL — LOW (ref 4.2–5.8)
RBC # FLD: 25 % — HIGH (ref 10.3–14.5)
RETICS #: 217.5 K/UL — HIGH (ref 25–125)
RETICS/RBC NFR: 8.3 % — HIGH (ref 0.5–2.5)
SODIUM SERPL-SCNC: 132 MMOL/L — LOW (ref 135–145)
TRIGL SERPL-MCNC: 185 MG/DL — HIGH (ref 10–149)
WBC # BLD: 24.61 K/UL — HIGH (ref 3.8–10.5)
WBC # FLD AUTO: 24.61 K/UL — HIGH (ref 3.8–10.5)

## 2020-09-11 PROCEDURE — 99232 SBSQ HOSP IP/OBS MODERATE 35: CPT

## 2020-09-11 PROCEDURE — 71045 X-RAY EXAM CHEST 1 VIEW: CPT | Mod: 26

## 2020-09-11 PROCEDURE — 99291 CRITICAL CARE FIRST HOUR: CPT

## 2020-09-11 PROCEDURE — 99233 SBSQ HOSP IP/OBS HIGH 50: CPT

## 2020-09-11 RX ORDER — MAGNESIUM SULFATE 500 MG/ML
1 VIAL (ML) INJECTION ONCE
Refills: 0 | Status: COMPLETED | OUTPATIENT
Start: 2020-09-11 | End: 2020-09-11

## 2020-09-11 RX ORDER — ELECTROLYTE SOLUTION,INJ
1 VIAL (ML) INTRAVENOUS
Refills: 0 | Status: DISCONTINUED | OUTPATIENT
Start: 2020-09-11 | End: 2020-09-13

## 2020-09-11 RX ORDER — I.V. FAT EMULSION 20 G/100ML
10.4 EMULSION INTRAVENOUS
Qty: 25 | Refills: 0 | Status: DISCONTINUED | OUTPATIENT
Start: 2020-09-11 | End: 2020-09-12

## 2020-09-11 RX ADMIN — I.V. FAT EMULSION 10.4 ML/HR: 20 EMULSION INTRAVENOUS at 19:35

## 2020-09-11 RX ADMIN — INSULIN HUMAN 1 UNIT(S)/HR: 100 INJECTION, SOLUTION SUBCUTANEOUS at 19:36

## 2020-09-11 RX ADMIN — Medication 500 MILLIGRAM(S): at 21:16

## 2020-09-11 RX ADMIN — CHLORHEXIDINE GLUCONATE 1 APPLICATION(S): 213 SOLUTION TOPICAL at 06:12

## 2020-09-11 RX ADMIN — PANTOPRAZOLE SODIUM 40 MILLIGRAM(S): 20 TABLET, DELAYED RELEASE ORAL at 11:04

## 2020-09-11 RX ADMIN — Medication 8 MILLIGRAM(S): at 08:30

## 2020-09-11 RX ADMIN — Medication 500 MILLIGRAM(S): at 01:47

## 2020-09-11 RX ADMIN — CYCLOSPORINE 2.1 MILLIGRAM(S): 100 CAPSULE ORAL at 11:04

## 2020-09-11 RX ADMIN — I.V. FAT EMULSION 10.4 ML/HR: 20 EMULSION INTRAVENOUS at 16:51

## 2020-09-11 RX ADMIN — Medication 2.5 MG/HR: at 19:36

## 2020-09-11 RX ADMIN — Medication 1 EACH: at 19:36

## 2020-09-11 RX ADMIN — Medication 50 MILLIGRAM(S): at 21:16

## 2020-09-11 RX ADMIN — Medication 2.5 MG/HR: at 01:45

## 2020-09-11 RX ADMIN — HEPARIN SODIUM 5000 UNIT(S): 5000 INJECTION INTRAVENOUS; SUBCUTANEOUS at 14:48

## 2020-09-11 RX ADMIN — Medication 50 MILLIGRAM(S): at 14:48

## 2020-09-11 RX ADMIN — HEPARIN SODIUM 5000 UNIT(S): 5000 INJECTION INTRAVENOUS; SUBCUTANEOUS at 21:16

## 2020-09-11 RX ADMIN — Medication 2.5 MG/HR: at 11:04

## 2020-09-11 RX ADMIN — Medication 500 MILLIGRAM(S): at 16:10

## 2020-09-11 RX ADMIN — Medication 50 MILLIGRAM(S): at 06:15

## 2020-09-11 RX ADMIN — Medication 100 GRAM(S): at 01:50

## 2020-09-11 RX ADMIN — Medication 1 EACH: at 17:01

## 2020-09-11 RX ADMIN — HEPARIN SODIUM 5000 UNIT(S): 5000 INJECTION INTRAVENOUS; SUBCUTANEOUS at 06:15

## 2020-09-11 RX ADMIN — Medication 500 MILLIGRAM(S): at 09:31

## 2020-09-11 RX ADMIN — Medication 500 MILLIGRAM(S): at 03:47

## 2020-09-11 RX ADMIN — INSULIN HUMAN 1 UNIT(S)/HR: 100 INJECTION, SOLUTION SUBCUTANEOUS at 01:45

## 2020-09-11 RX ADMIN — Medication 500 MILLIGRAM(S): at 09:18

## 2020-09-11 NOTE — PROGRESS NOTE ADULT - SUBJECTIVE AND OBJECTIVE BOX
[facility] NUTRITION SUPPORT / TPN -- FOLLOW UP NOTE  --------------------------------------------------------------------------------    24 hour events/subjective:      had oatmeal for breakfast       Glucerna 1.5 TF just increased to 30mL/h   no abdominal pain, feeling less bloated  tolerating TF w/o N/ V-   (+) flatus/ (+)BM   no f/c/s  no dyspnea/ sob/ palp/ cp        Diet:  Diet, Full Liquid:   Consistent Carbohydrate No Snacks (CSTCHO)  Tube Feeding Modality: Nasogastric  Glucerna 1.5 Sohail (GLUCERNA1.5RTH)  Total Volume for 24 Hours (mL): 480  Continuous  Starting Tube Feed Rate mL per Hour: 20  Until Goal Tube Feed Rate (mL per Hour): 20  Tube Feed Duration (in Hours): 24  Tube Feed Start Time: 11:00 (09-10-20 @ 10:38)      Appetite: [x  ]Poor [  ]Adequate [  ]Good  Caloric intake:  [ x  ]  Adequate   [   ] Inadequate    ROS: General/ GI see HPI  all other systems negative      ALLERGIES & MEDICATIONS  --------------------------------------------------------------------------------  No Known Allergies      STANDING INPATIENT MEDICATIONS    albumin human 25% IVPB 50 milliLiter(s) IV Intermittent every 10 minutes  chlorhexidine 2% Cloths 1 Application(s) Topical <User Schedule>  cycloSPORINE  (SandIMMUNE) IVPB 20 milliGRAM(s) IV Intermittent every 24 hours  fat emulsion (Fish Oil and Plant Based) 20% Infusion 10.4 mL/Hr IV Continuous <Continuous>  furosemide Infusion 5 mG/Hr IV Continuous <Continuous>  heparin   Injectable 5000 Unit(s) SubCutaneous every 8 hours  hydrALAZINE 50 milliGRAM(s) Oral every 8 hours  insulin regular Infusion 1 Unit(s)/Hr IV Continuous <Continuous>  methylPREDNISolone sodium succinate Injectable 8 milliGRAM(s) IV Push <User Schedule>  pantoprazole  Injectable 40 milliGRAM(s) IV Push daily  Parenteral Nutrition - Adult 1 Each TPN Continuous <Continuous>  vancomycin    Solution 500 milliGRAM(s) Oral every 6 hours  vancomycin  Retention Enema 500 milliGRAM(s) Rectal every 6 hours      PRN INPATIENT MEDICATION  benzocaine 15 mG/menthol 3.6 mG (Sugar-Free) Lozenge 1 Lozenge Oral every 6 hours PRN        VITALS/PHYSICAL EXAM  --------------------------------------------------------------------------------  T(C): 36.5 (09-11-20 @ 08:00), Max: 36.9 (09-10-20 @ 20:00)  HR: 119 (09-11-20 @ 11:00) (115 - 129)  BP: --  RR: 11 (09-11-20 @ 11:00) (11 - 38)  SpO2: 100% (09-11-20 @ 11:00) (91% - 100%)  Wt(kg): --        09-10-20 @ 07:01  -  09-11-20 @ 07:00  --------------------------------------------------------  IN: 3205.4 mL / OUT: 5620 mL / NET: -2414.6 mL    09-11-20 @ 07:01  -  09-11-20 @ 11:50  --------------------------------------------------------  IN: 573.4 mL / OUT: 600 mL / NET: -26.6 mL      PHYSICAL EXAM:  GEN:  guarded but stable, WD/WN/WG, (+)KO TF  HEENT: NC/AT, PERRL, mucosa moist & throat clear, no trachea midline w/ neck supple  GI: (+) BS, softly distended, nontender   MSK:  FROM x4, no deformities  VASCULAR: Equally warm, no cyanosis, clubbing,        Lt IJ TLC &  LUE PICC w/ dressing c/d/i,         BLE mild edema equal,  R>LUE edema- soft nontender w/o cord or erythema  Neurology; no focal deficits, weakened strength & sensation grossly intact  Psych: normal affect  Skin: warm,  moist, & w/ good turgor      LABS/ CULTURES/ RADIOLOGY:              8.2    24.61 >-----------<  124      [09-11-20 @ 00:33]              25.7     132  |  91  |  92  ----------------------------<  101      [09-11-20 @ 00:31]  4.0   |  29  |  1.27        Ca     8.8     [09-11-20 @ 00:31]      Mg     1.9     [09-11-20 @ 00:31]      Phos  3.6     [09-11-20 @ 00:31]    TPro  6.6  /  Alb  2.7  /  TBili  0.8  /  DBili  0.4  /  AST  89  /  ALT  45  /  AlkPhos  277  [09-11-20 @ 00:31]              [09-11-20 @ 00:31]    Blood Gas Arterial - Calcium, Ionized: 1.14 mmoL/L (09-11-20 @ 00:02)  Blood Gas Arterial - Calcium, Ionized: 1.14 mmoL/L (09-10-20 @ 16:19)      CAPILLARY BLOOD GLUCOSE  POCT Blood Glucose.: 139 mg/dL (11 Sep 2020 11:07)  POCT Blood Glucose.: 142 mg/dL (11 Sep 2020 10:09)  POCT Blood Glucose.: 129 mg/dL (11 Sep 2020 09:10)  POCT Blood Glucose.: 118 mg/dL (11 Sep 2020 08:08)  POCT Blood Glucose.: 125 mg/dL (11 Sep 2020 06:48)  POCT Blood Glucose.: 121 mg/dL (11 Sep 2020 05:59)  POCT Blood Glucose.: 129 mg/dL (11 Sep 2020 05:08)  POCT Blood Glucose.: 121 mg/dL (11 Sep 2020 04:06)  POCT Blood Glucose.: 107 mg/dL (11 Sep 2020 03:09)  POCT Blood Glucose.: 99 mg/dL (11 Sep 2020 01:59)  POCT Blood Glucose.: 103 mg/dL (11 Sep 2020 01:02)  POCT Blood Glucose.: 110 mg/dL (10 Sep 2020 23:02)  POCT Blood Glucose.: 112 mg/dL (10 Sep 2020 22:06)  POCT Blood Glucose.: 101 mg/dL (10 Sep 2020 21:07)  POCT Blood Glucose.: 116 mg/dL (10 Sep 2020 19:57)  POCT Blood Glucose.: 118 mg/dL (10 Sep 2020 18:43)  POCT Blood Glucose.: 156 mg/dL (10 Sep 2020 18:10)  POCT Blood Glucose.: 139 mg/dL (10 Sep 2020 17:06)  POCT Blood Glucose.: 129 mg/dL (10 Sep 2020 15:58)  POCT Blood Glucose.: 115 mg/dL (10 Sep 2020 14:58)  POCT Blood Glucose.: 145 mg/dL (10 Sep 2020 14:28)  POCT Blood Glucose.: 123 mg/dL (10 Sep 2020 12:55)  POCT Blood Glucose.: 115 mg/dL (10 Sep 2020 12:21)    Prealbumin, Serum: 22 mg/dL (09-08-20 @ 16:22)  Prealbumin, Serum: 10 mg/dL (09-02-20 @ 08:12)  Prealbumin, Serum: 24 mg/dL (08-25-20 @ 15:10)      Triglycerides, Serum: 185 mg/dL (09-11-20 @ 00:31)  Triglycerides, Serum: 200 mg/dL (09-10-20 @ 00:41)  Triglycerides, Serum: 289 mg/dL (09-09-20 @ 00:29)  Triglycerides, Serum: 326 mg/dL (09-08-20 @ 11:05)  Triglycerides, Serum: 285 mg/dL (09-08-20 @ 01:52)

## 2020-09-11 NOTE — PROGRESS NOTE ADULT - SUBJECTIVE AND OBJECTIVE BOX
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 Patient was treated w/ prednisone and Rituximab. Tacro was discontinued and patient was also transitioned to everolimus  Admitted  6/16-6/19  for hyperglycemia.  Reported to transplant team having one done black tarry stool-  instructed to  present to [facility] for hemolytic anemia. Patient has been following with Hematology outpatient.  Denies CP  N/V diarrhea SOB. (11 Aug 2020 20:08)      Surgery/Hospital Course:  8/11 Admitted to [facility] with symptomatic anemia  8/13 EGD for investigation of tarry stools  8/15 SB capsule: stomach & SB lesions, likely ulcers.  8/17 EGD/push enteroscopy w/ angioectasias/erosions in small bowel. Gastropathy and esophagitis. Ulcer seen on VCE most likely scope trauma.    8/18 VSS transferred back to floor.   8/20 VS 9.0/28.4 stable Gastric biopsy results LA Grade A esophagitis.  - Acute gastritis. Biopsies taken to r/o CMV, No ulceration seen despite finding on capsule endoscopy - likely 2/2 scope  trauma that has since resolved. Pathology pending.  8/21 VSS H/H  8.1/25.7  trending down will monitor prednisone taper 30 mg today. Procardia 120 initiated today RHC biopsy Monday.   8/22 VVS; Patient reports loose stools x 2-3 days with 1 episode today.  Stool sent for C-dif and GI PCR. Abdominal X-ray revealed: dilated loops of bowel. Abdomen distended.  Patient denies N/V. GI called to re-evaluate. Continue with current medication regimen.  Awaiting for upcoming cardiac biopsy on Monday 8/24.  8/23   vss    creat up to 2.28 ,  repeating CMP,  TTE ordered  Bladder scan   8/24   cardiac bx cancelled   elev creat  to 2.74   cardio renal cslt called,    + CDIF   inc vanco to 500 q6   ID following  8/25 VSS: RHC today as per transplant team; transfuse 2 units PRBC today as per Dr. Viramontes; continue vanco for cdiff; transfer patient to CTU after cath today   8/26. Dieretic challenge with good response   8/27: Downgraded to the floor then upgraded to the CTU again for concerning of abd distention   8/28 CT ABD & Pelvis reveals pancolitis  8/30: repeat CT scan of abd, gen surgery called to re-evaluated pt.  8/31: RUE duplex negative for DVT  9/4 NGT clamped, minimal residual. NGT removed. started sips   9/6 tolerating clear liquid diet  9/8 Bronch  9/9 1 prbc   9/10 increased tube feeds to 20cc/hr and tpn decreased to 63cc/hr from 125cc/hr     Today:      REVIEW OF SYSTEMS:  Gen: No fever  EYES/ENT: No visual changes;  No vertigo or throat pain   NECK: No pain   RES:  No shortness of breath or Cough  Chest: No incisional pain  CARD: No chest pain   GI: + abdominal  distension  : No dysuria  NEURO: No weakness  SKIN: No itching, rashes       ICU Vital Signs Last 24 Hrs  T(C): 36.5 (11 Sep 2020 08:00), Max: 36.9 (10 Sep 2020 20:00)  T(F): 97.7 (11 Sep 2020 08:00), Max: 98.4 (10 Sep 2020 20:00)  HR: 119 (11 Sep 2020 11:00) (115 - 129)  BP: --  BP(mean): --  ABP: 115/56 (11 Sep 2020 11:00) (100/51 - 150/74)  ABP(mean): 75 (11 Sep 2020 11:00) (67 - 96)  RR: 11 (11 Sep 2020 11:00) (11 - 38)  SpO2: 100% (11 Sep 2020 11:00) (91% - 100%)    Physical Exam:  Gen:  Awake, alert   CNS: non focal 	  Neck: no JVD  RES : Diminished breath sounds B/L , crackles at bases, no wheezing                    CVS: Sinus tachycardia. Normal S1/S2  Chest: Dressing C/D/I  Abd: Soft, distended. Bowel sounds present.  Skin: No rash.  Ext: +4 pitting edema in RUE, +2 edema b/l LE, +2 pitting edema in LUE.  Lines: Right Radial [place] 9/8, Left Triple IJ intro 9/03      ============================I/O===========================   I&O's Detail    10 Sep 2020 07:01  -  11 Sep 2020 07:00  --------------------------------------------------------  IN:    fat emulsion (Fish Oil and Plant Based) 20% Infusion: 156 mL    furosemide Infusion: 60 mL    Glucerna 1.5: 450 mL    insulin regular Infusion: 82 mL    Oral Fluid: 237 mL    Solution: 50.4 mL    Solution: 100 mL    TPN (Total Parenteral Nutrition): 2070 mL  Total IN: 3205.4 mL    OUT:    Indwelling Catheter - Urethral: 5620 mL  Total OUT: 5620 mL    Total NET: -2414.6 mL      11 Sep 2020 07:01  -  11 Sep 2020 11:59  --------------------------------------------------------  IN:    furosemide Infusion: 10 mL    Glucerna 1.5: 90 mL    insulin regular Infusion: 13 mL    Oral Fluid: 200 mL    Solution: 8.4 mL    TPN (Total Parenteral Nutrition): 252 mL  Total IN: 573.4 mL    OUT:    Indwelling Catheter - Urethral: 600 mL  Total OUT: 600 mL    Total NET: -26.6 mL    ============================ LABS =========================                        8.2    24.61 )-----------( 124      ( 11 Sep 2020 00:33 )             25.7     09-11    132<L>  |  91<L>  |  92<H>  ----------------------------<  101<H>  4.0   |  29  |  1.27    Ca    8.8      11 Sep 2020 00:31  Phos  3.6     09-11  Mg     1.9     09-11    TPro  6.6  /  Alb  2.7<L>  /  TBili  0.8  /  DBili  0.4<H>  /  AST  89<H>  /  ALT  45  /  AlkPhos  277<H>  09-11    LIVER FUNCTIONS - ( 11 Sep 2020 00:31 )  Alb: 2.7 g/dL / Pro: 6.6 g/dL / ALK PHOS: 277 U/L / ALT: 45 U/L / AST: 89 U/L / GGT: x           ABG - ( 11 Sep 2020 00:02 )  pH, Arterial: 7.46  pH, Blood: x     /  pCO2: 46    /  pO2: 79    / HCO3: 32    / Base Excess: 7.5   /  SaO2: 96        ======================Micro/Rad/Cardio=================  Culture: Reviewed   CXR: Reviewed  Echo: Reviewed  ======================================================  PAST MEDICAL & SURGICAL HISTORY:  H/O hemolytic anemia  H/O autoimmune hemolytic anemia  Knee pain, right  HLD (hyperlipidemia)  Former smoker  DVT of upper extremity (deep vein thrombosis)  Hepatitis C virus  GIB (gastrointestinal bleeding)  Ventricular fibrillation: s/p AICD  PAF (paroxysmal atrial fibrillation): on xarelto  Non-Ischemic Cardiomyopathy  SVT (Supraventricular Tachycardia)  HTN  CHF (Congestive Heart Failure)  S/P right heart catheterization: biopsy multiple  H/O heart transplant: 2/2018  Status post left hip replacement  History of Prior Ablation Treatment: for afib  AICD (Automatic Cardioverter/Defibrillator) Present: [employer] [place] with 1 [employer] [place] lead4/1/09- explanted and replaced with Medtronic 2 leads on 9/2/09    ====================ASSESSMENT ==============  Heart transplant 2/2018 for ACC/AHA stage D NICM   Resolved GI bleed, Melena s/p EGD  Supraventricular tachycardia  Severe hypertension  Hyperlactatemia acidosis, resolved  Acute respiratory failure, resolved  Leukopenia- resolved  Acute blood loss anemia, stable   Abdominal distention  ileus  Leukocytosis  CKD Stage III  C. diff diarrhea  C. diff colitis  Klebsiella oxytoca bacteremia  Sepsis  Azotemia 2/2 CKD and Steroids   Pancolitis  Macrocytic hemolytic anemia with (+) IGg cornel       Plan:  ====================== NEUROLOGY=====================  Nonfocal, continue to monitor neurological status.   Continue physical therapy- OOB to chair.    ==================== RESPIRATORY======================  Persistent RLL Collapse on CXR requiring bronchoscopy on 9/08 without improvement in CXR/  Comfortable on supplemental oxygen therapy via 4L nasal cannula, SpO2 100% with minimal secretions on RLL  Encourage incentive spirometry, continue pulse ox monitoring, follow ABGs   Encourage chest PT.      ====================CARDIOVASCULAR==================  Transplant team following.   Heart transplant 2/2018 for ACC/AHA stage D NICM  (NPO for abd distention) off everolimus. Off cellcept while on high dose steroids.   ASA on hold due to hx of GI bleed / ulceration.  Continue invasive hemodynamic monitoring via Arterial Line  Continue Hydralazine 50mg PO q8 for a goal MAP of 70-80    hydrALAZINE 50 milliGRAM(s) Oral every 8 hours    ===================HEMATOLOGIC/ONC ===================  Anemia s/p multiple blood transfusions.   Continue closely monitoring H&H and transfuse prn.   GI Bleeding ruled out s/p EGD, found to have ulcers.     Hx of Autoimmune hemolytic anemia.  Patient with (+) Igg Cornel,  f/u am hemolysis labs. f/u G6PD     Continue heparin for DVT prophylaxis,  heparin   Injectable 5000 Unit(s) SubCutaneous every 8 hours    ===================== RENAL =========================  Anasarca. NORMAN on CKD Stage III.   Diuresis with Lasix Infusion as per HF recommendations   Monitor BUN/Cr, I&Os, urine output via diaz    furosemide Infusion 5 mG/Hr (2.5 mL/Hr) IV Continuous <Continuous>    ==================== GASTROINTESTINAL===================  Nutrition following, continue full liquid diet with tube feeds Glucerna 1.5 at goal rate 20 cc/hr  Patient with ileus in setting of C.Diff- NGT clamped 9/4 for 4 hrs, no residual. NGT removed, stared on sips, c/w TPN for now  Monitor BMs and flatus.   CT A/P on 8/30 with Pancolitis and small volume abdominopelvic ascites, not significantly changed since 8/20/2020.  General surgery following-continue serial abdominal exams. No surgical intervention at this time.   Continue Protonix for stress ulcer prophylaxis.    albumin human 25% IVPB 50 milliLiter(s) IV Intermittent every 10 minutes  fat emulsion (Fish Oil and Plant Based) 20% Infusion 10.4 mL/Hr (10.4 mL/Hr) IV Continuous <Continuous>  pantoprazole  Injectable 40 milliGRAM(s) IV Push daily  Parenteral Nutrition - Adult 1 Each (63 mL/Hr) TPN Continuous <Continuous>    =======================    ENDOCRINE  =====================  Stress hyperglycemia, requiring glucose control with insulin gtt, titrate as per insulin drip protocol along with hourly glucose checks.     insulin regular Infusion 1 Unit(s)/Hr (1 mL/Hr) IV Continuous <Continuous>  methylPREDNISolone sodium succinate Injectable 8 milliGRAM(s) IV Push <User Schedule>    ========================INFECTIOUS DISEASE================  BCx 8/13: (+)Klebsiella bacteremia, has since been cleared.  Clostridium difficile PCR 8/23: (+), continue with vancomycin PO and Rectal Vanco   -s/p 5days of IVIG (8/29-9/1)   -s/p eravaycline (8/31- 9/6)   -s/p IV flagyl discontinued (8/24-9/1)  WBC remains at 22 since yesterday AM  Transplant prophylaxis with Posaconazole and Atovaquone on hold due to ileus, continue Cyclosporin infusion  Dr. Lujan following, no need for additional antibiotics at this time, Monitor for now.   8/25, 9/04, 9/08 Blood cultures all Negative.     vancomycin    Solution 500 milliGRAM(s) Oral every 6 hours  vancomycin  Retention Enema 500 milliGRAM(s) Rectal every 6 hours      Patient requires continuous monitoring with bedside rhythm monitoring, pulse ox monitoring, and intermittent blood gas analysis. Care plan discussed with ICU care team. Patient remained critical and at risk for life threatening decompensation.     By signing my name below, IAngie, attest that this documentation has been prepared under the direction and in the presence of Elijah Landry PA.  Electronically signed: Ab Garcia, 09-11-20 @ 11:59    I, Elijah Landry, personally performed the services described in this documentation. All medical record entries made by the ab were at my direction and in my presence. I have reviewed the chart and agree that the record reflects my personal performance and is accurate and complete  Electronically signed: Elijah Landry PA. 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 Patient was treated w/ prednisone and Rituximab. Tacro was discontinued and patient was also transitioned to everolimus  Admitted  6/16-6/19  for hyperglycemia.  Reported to transplant team having one done black tarry stool-  instructed to  present to [facility] for hemolytic anemia. Patient has been following with Hematology outpatient.  Denies CP  N/V diarrhea SOB. (11 Aug 2020 20:08)      Surgery/Hospital Course:  8/11 Admitted to [facility] with symptomatic anemia  8/13 EGD for investigation of tarry stools  8/15 SB capsule: stomach & SB lesions, likely ulcers.  8/17 EGD/push enteroscopy w/ angioectasias/erosions in small bowel. Gastropathy and esophagitis. Ulcer seen on VCE most likely scope trauma.    8/18 VSS transferred back to floor.   8/20 VS 9.0/28.4 stable Gastric biopsy results LA Grade A esophagitis.  - Acute gastritis. Biopsies taken to r/o CMV, No ulceration seen despite finding on capsule endoscopy - likely 2/2 scope  trauma that has since resolved. Pathology pending.  8/21 VSS H/H  8.1/25.7  trending down will monitor prednisone taper 30 mg today. Procardia 120 initiated today RHC biopsy Monday.   8/22 VVS; Patient reports loose stools x 2-3 days with 1 episode today.  Stool sent for C-dif and GI PCR. Abdominal X-ray revealed: dilated loops of bowel. Abdomen distended.  Patient denies N/V. GI called to re-evaluate. Continue with current medication regimen.  Awaiting for upcoming cardiac biopsy on Monday 8/24.  8/23   vss    creat up to 2.28 ,  repeating CMP,  TTE ordered  Bladder scan   8/24   cardiac bx cancelled   elev creat  to 2.74   cardio renal cslt called,    + CDIF   inc vanco to 500 q6   ID following  8/25 VSS: RHC today as per transplant team; transfuse 2 units PRBC today as per Dr. Viramontes; continue vanco for cdiff; transfer patient to CTU after cath today   8/26. Dieretic challenge with good response   8/27: Downgraded to the floor then upgraded to the CTU again for concerning of abd distention   8/28 CT ABD & Pelvis reveals pancolitis  8/30: repeat CT scan of abd, gen surgery called to re-evaluated pt.  8/31: RUE duplex negative for DVT  9/4 NGT clamped, minimal residual. NGT removed. started sips   9/6 tolerating clear liquid diet  9/8 Bronch  9/9 1 prbc   9/10 increased tube feeds to 20cc/hr and tpn decreased to 63cc/hr from 125cc/hr     Today: Continued improvement of abdominal distension, + BS. Increased tube feeds to 30 cc/hr with goal of decreasing TPN requirements. Continue to monitor WBC, can dc the rectal Vanco per ID and continue PO as improvement in abdominal absorption.     REVIEW OF SYSTEMS:  Gen: No fever  EYES/ENT: No visual changes;  No vertigo or throat pain   NECK: No pain   RES:  No shortness of breath or Cough  Chest: No incisional pain  CARD: No chest pain   GI: + abdominal  distension  : No dysuria  NEURO: No weakness  SKIN: No itching, rashes     ICU Vital Signs Last 24 Hrs  T(C): 36.5 (11 Sep 2020 08:00), Max: 36.9 (10 Sep 2020 20:00)  T(F): 97.7 (11 Sep 2020 08:00), Max: 98.4 (10 Sep 2020 20:00)  HR: 119 (11 Sep 2020 11:00) (115 - 129)  BP: --  BP(mean): --  ABP: 115/56 (11 Sep 2020 11:00) (100/51 - 150/74)  ABP(mean): 75 (11 Sep 2020 11:00) (67 - 96)  RR: 11 (11 Sep 2020 11:00) (11 - 38)  SpO2: 100% (11 Sep 2020 11:00) (91% - 100%)    Physical Exam:  Gen:  Awake, alert   CNS: non focal 	  Neck: no JVD  RES : Diminished breath sounds B/L , crackles at bases, no wheezing                    CVS: Sinus tachycardia. Normal S1/S2  Chest: Dressing C/D/I  Abd: Soft, distended. Bowel sounds present.  Skin: No rash.  Ext: +4 pitting edema in RUE, +2 edema b/l LE, +2 pitting edema in LUE.  Lines: Right Radial [place] 9/8, Left Triple IJ intro 9/03    ============================I/O===========================   I&O's Detail    10 Sep 2020 07:01  -  11 Sep 2020 07:00  --------------------------------------------------------  IN:    fat emulsion (Fish Oil and Plant Based) 20% Infusion: 156 mL    furosemide Infusion: 60 mL    Glucerna 1.5: 450 mL    insulin regular Infusion: 82 mL    Oral Fluid: 237 mL    Solution: 50.4 mL    Solution: 100 mL    TPN (Total Parenteral Nutrition): 2070 mL  Total IN: 3205.4 mL    OUT:    Indwelling Catheter - Urethral: 5620 mL  Total OUT: 5620 mL    Total NET: -2414.6 mL    11 Sep 2020 07:01  -  11 Sep 2020 11:59  --------------------------------------------------------  IN:    furosemide Infusion: 10 mL    Glucerna 1.5: 90 mL    insulin regular Infusion: 13 mL    Oral Fluid: 200 mL    Solution: 8.4 mL    TPN (Total Parenteral Nutrition): 252 mL  Total IN: 573.4 mL    OUT:    Indwelling Catheter - Urethral: 600 mL  Total OUT: 600 mL    Total NET: -26.6 mL    ============================ LABS =========================                        8.2    24.61 )-----------( 124      ( 11 Sep 2020 00:33 )             25.7     09-11    132<L>  |  91<L>  |  92<H>  ----------------------------<  101<H>  4.0   |  29  |  1.27    Ca    8.8      11 Sep 2020 00:31  Phos  3.6     09-11  Mg     1.9     09-11    TPro  6.6  /  Alb  2.7<L>  /  TBili  0.8  /  DBili  0.4<H>  /  AST  89<H>  /  ALT  45  /  AlkPhos  277<H>  09-11    LIVER FUNCTIONS - ( 11 Sep 2020 00:31 )  Alb: 2.7 g/dL / Pro: 6.6 g/dL / ALK PHOS: 277 U/L / ALT: 45 U/L / AST: 89 U/L / GGT: x           ABG - ( 11 Sep 2020 00:02 )  pH, Arterial: 7.46  pH, Blood: x     /  pCO2: 46    /  pO2: 79    / HCO3: 32    / Base Excess: 7.5   /  SaO2: 96        ======================Micro/Rad/Cardio=================  Culture: Reviewed   CXR: Reviewed  Echo: Reviewed  ======================================================  PAST MEDICAL & SURGICAL HISTORY:  H/O hemolytic anemia  H/O autoimmune hemolytic anemia  Knee pain, right  HLD (hyperlipidemia)  Former smoker  DVT of upper extremity (deep vein thrombosis)  Hepatitis C virus  GIB (gastrointestinal bleeding)  Ventricular fibrillation: s/p AICD  PAF (paroxysmal atrial fibrillation): on xarelto  Non-Ischemic Cardiomyopathy  SVT (Supraventricular Tachycardia)  HTN  CHF (Congestive Heart Failure)  S/P right heart catheterization: biopsy multiple  H/O heart transplant: 2/2018  Status post left hip replacement  History of Prior Ablation Treatment: for afib  AICD (Automatic Cardioverter/Defibrillator) Present: [employer] [place] with 1 [employer] [place] lead4/1/09- explanted and replaced with Medtronic 2 leads on 9/2/09    ====================ASSESSMENT ==============  Heart transplant 2/2018 for ACC/AHA stage D NICM   Resolved GI bleed, Melena s/p EGD  Supraventricular tachycardia  Severe hypertension  Hyperlactatemia acidosis, resolved  Acute respiratory failure, resolved  Leukopenia- resolved  Acute blood loss anemia, stable   Abdominal distention  ileus  Leukocytosis  CKD Stage III  C. diff diarrhea  C. diff colitis  Klebsiella oxytoca bacteremia  Sepsis  Azotemia 2/2 CKD and Steroids   Pancolitis  Macrocytic hemolytic anemia with (+) IGg cornel       Plan:  ====================== NEUROLOGY=====================  Nonfocal, continue to monitor neurological status.   Continue physical therapy- OOB to chair.    ==================== RESPIRATORY======================  Persistent RLL Collapse on CXR requiring bronchoscopy on 9/08   Comfortable on supplemental oxygen therapy via 4L nasal cannula, SpO2 100% with minimal secretions on RLL  Encourage incentive spirometry, continue pulse ox monitoring, follow ABGs   Encourage chest PT.      ====================CARDIOVASCULAR==================  Transplant team following.   Heart transplant 2/2018 for ACC/AHA stage D NICM  (NPO for abd distention) off everolimus. Off cellcept while on high dose steroids.   ASA on hold due to hx of GI bleed / ulceration.  Continue invasive hemodynamic monitoring via Arterial Line  Continue Hydralazine 50mg PO q8 for a goal MAP of 70-80    hydrALAZINE 50 milliGRAM(s) Oral every 8 hours    ===================HEMATOLOGIC/ONC ===================  Anemia s/p multiple blood transfusions.   Continue closely monitoring H&H and transfuse prn.   GI Bleeding ruled out s/p EGD, found to have ulcers.     Hx of Autoimmune hemolytic anemia.  Patient with (+) Igg Cornel,  f/u am hemolysis labs. f/u G6PD     Continue heparin for DVT prophylaxis,  heparin   Injectable 5000 Unit(s) SubCutaneous every 8 hours    ===================== RENAL =========================  Anasarca. NORMAN on CKD Stage III.   Diuresis with Lasix Infusion as per HF recommendations   Monitor BUN/Cr, I&Os, urine output via diaz    furosemide Infusion 5 mG/Hr (2.5 mL/Hr) IV Continuous <Continuous>    ==================== GASTROINTESTINAL===================  Nutrition following, continue full liquid diet with tube feeds Glucerna 1.5 at goal rate 20 cc/hr  Patient with ileus in setting of C.Diff- NGT clamped 9/4 for 4 hrs, no residual. NGT removed, stared on sips, c/w TPN for now  Monitor BMs and flatus.   CT A/P on 8/30 with Pancolitis and small volume abdominopelvic ascites, not significantly changed since 8/20/2020.  General surgery following-continue serial abdominal exams. No surgical intervention at this time.   Continue Protonix for stress ulcer prophylaxis.    albumin human 25% IVPB 50 milliLiter(s) IV Intermittent every 10 minutes  fat emulsion (Fish Oil and Plant Based) 20% Infusion 10.4 mL/Hr (10.4 mL/Hr) IV Continuous <Continuous>  pantoprazole  Injectable 40 milliGRAM(s) IV Push daily  Parenteral Nutrition - Adult 1 Each (63 mL/Hr) TPN Continuous <Continuous>    =======================    ENDOCRINE  =====================  Stress hyperglycemia, requiring glucose control with insulin gtt, titrate as per insulin drip protocol along with hourly glucose checks.     insulin regular Infusion 1 Unit(s)/Hr (1 mL/Hr) IV Continuous <Continuous>  methylPREDNISolone sodium succinate Injectable 8 milliGRAM(s) IV Push <User Schedule>    ========================INFECTIOUS DISEASE================  BCx 8/13: (+)Klebsiella bacteremia, has since been cleared.  Clostridium difficile PCR 8/23: (+), continue with vancomycin PO, can dc rectal Vanco    -s/p 5days of IVIG (8/29-9/1)   -s/p eravaycline (8/31- 9/6)   -s/p IV flagyl discontinued (8/24-9/1)  -s/p Rectal Vanco (8/23-9/11)  WBC remains at 22 since yesterday AM  Transplant prophylaxis with Posaconazole and Atovaquone on hold due to ileus, continue Cyclosporin infusion  Dr. Lujan following, no need for additional antibiotics at this time, Monitor for now.   8/25, 9/04, 9/08 Blood cultures all Negative.     vancomycin    Solution 500 milliGRAM(s) Oral every 6 hours  vancomycin  Retention Enema 500 milliGRAM(s) Rectal every 6 hours      Patient requires continuous monitoring with bedside rhythm monitoring, pulse ox monitoring, and intermittent blood gas analysis. Care plan discussed with ICU care team. Patient remained critical and at risk for life threatening decompensation.     By signing my name below, IAngie, attest that this documentation has been prepared under the direction and in the presence of Elijah Landry PA.  Electronically signed: Ab Garcia, 09-11-20 @ 11:59    I, Elijah Landry, personally performed the services described in this documentation. All medical record entries made by the ab were at my direction and in my presence. I have reviewed the chart and agree that the record reflects my personal performance and is accurate and complete  Electronically signed: Elijah Landry PA.

## 2020-09-11 NOTE — PROGRESS NOTE ADULT - PROBLEM SELECTOR PLAN 5
- Appropriate response to 1u pRBCs 9/10.  - Repeat hemolysis labs look better without intervention.  - Appreciate Heme input. Given severity of C.diff infection, unable to give higher dose steroids at this time, but will reassess as he improves.  - Case reviewed with Blood Bank Director, Dr. Tomlinson.  - Follow-up G6PD to look for deficiency as contributor to the hemolytic anemia.

## 2020-09-11 NOTE — CHART NOTE - NSCHARTNOTEFT_GEN_A_CORE
Nutrition Follow Up Note    Patient seen for: Malnutrition follow up     Source: RN, Medical record, CTU team, TPN team.     Chart reviewed, events noted. 70 year old male with PMHx of NICM, s/p HM2 LVAD and OHT in 2018, with known coronary fistula. Recent admission for hemolytic anemia, now admitted for hyperglycemia and GIB. EGD and capsule study negative. Pt now C-Diff colitis w/o toxic megacolon. Pt receiving Vanco retention enema and Flagyl.  TPN team following, adjusting TPN as indicated. Pt on EN and PO diet as well.   He has ongoing improvement in abdominal distention, plan to further decrease TPN.     Diet : Full Liquids + Glucerna 1.5 @ 20ml/[id number] + TPN  TPN infusing at 63ml/hr (135Gm amino acids, 140Gm dextrose, 25gm 20%lipids) to provide  1266cal (16.8cal/kg, 1.79Gm prot/Kg per adm wt: 75.2kg).    Patient reports:  Pt seen, states he tolerated full liquid diet well. States he had oatmeal and milk for breakfast, states a little abdominal discomfort.   Per RN and PA Pt has + bowel sounds, soft abdomin and is actively having bowel movements. 2 BM thus far today. Pt had 2 BM's yesterday     Per discussion with team, will plan to titrate TPN to off while increase Glucerna 1.5 via NGT towards goal rate.   Per TPN team, plan to reduce total volume to 1L, decrease dextrose to 70gm, decrease AA to 75gm, and maintain Lipids at 25hrs. Insulin in TPN bag remaining at 50 units.     Pt continues on an insulin drip; presently infusing at 4cc. Team closely monitoring BG levels.       Daily Weight in k.2 (), Weight in k.3 (09-10), Weight in k.2 (), Weight in k.2 (), Weight in k.6 (), Weight in k.4 ()    Drug Dosing Weight  Weight (kg): 75.2 (17 Aug 2020 14:29)  BMI (kg/m2): 26 (17 Aug 2020 14:29)      Pertinent Medications: MEDICATIONS  (STANDING):  albumin human 25% IVPB 50 milliLiter(s) IV Intermittent every 10 minutes  chlorhexidine 2% Cloths 1 Application(s) Topical <User Schedule>  cycloSPORINE  (SandIMMUNE) IVPB 20 milliGRAM(s) IV Intermittent every 24 hours  fat emulsion (Fish Oil and Plant Based) 20% Infusion 10.4 mL/Hr (10.4 mL/Hr) IV Continuous <Continuous>  furosemide Infusion 5 mG/Hr (2.5 mL/Hr) IV Continuous <Continuous>  heparin   Injectable 5000 Unit(s) SubCutaneous every 8 hours  hydrALAZINE 50 milliGRAM(s) Oral every 8 hours  insulin regular Infusion 1 Unit(s)/Hr (1 mL/Hr) IV Continuous <Continuous>  methylPREDNISolone sodium succinate Injectable 8 milliGRAM(s) IV Push <User Schedule>  pantoprazole  Injectable 40 milliGRAM(s) IV Push daily  Parenteral Nutrition - Adult 1 Each (63 mL/Hr) TPN Continuous <Continuous>  vancomycin    Solution 500 milliGRAM(s) Oral every 6 hours    MEDICATIONS  (PRN):  benzocaine 15 mG/menthol 3.6 mG (Sugar-Free) Lozenge 1 Lozenge Oral every 6 hours PRN Sore Throat      LABS:    @ 00:33: Sodium --, Potassium --, Calcium --, Magnesium --, Phosphorus --, BUN --, Creatinine --, Glucose --, Alk Phos --, ALT/SGPT --, AST/SGOT --, Albumin --, Prealbumin --, Total Bilirubin --, Hemoglobin 8.2<L>, Hematocrit 25.7<L>, Ferritin --, C-Reactive Protein --, Creatine Kinase <<27>   @ 00:31: Sodium 132<L>, Potassium 4.0, Calcium 8.8, Magnesium 1.9, Phosphorus 3.6, BUN 92<H>, Creatinine 1.27, Glucose 101<H>, Alk Phos 277<H>, ALT/SGPT 45, AST/SGOT 89<H>, Albumin 2.7<L>, Prealbumin --, Total Bilirubin 0.8, Hemoglobin --, Hematocrit --, Ferritin --, C-Reactive Protein --, Creatine Kinase <<27>      Finger Sticks:  POCT Blood Glucose.: 118 mg/dL ( @ 12:28)  POCT Blood Glucose.: 139 mg/dL ( @ 11:07)  POCT Blood Glucose.: 142 mg/dL ( @ 10:09)  POCT Blood Glucose.: 129 mg/dL ( @ 09:10)  POCT Blood Glucose.: 118 mg/dL ( @ 08:08)  POCT Blood Glucose.: 125 mg/dL ( @ 06:48)  POCT Blood Glucose.: 121 mg/dL ( @ 05:59)  POCT Blood Glucose.: 129 mg/dL ( @ 05:08)  POCT Blood Glucose.: 121 mg/dL ( @ 04:06)  POCT Blood Glucose.: 107 mg/dL ( @ 03:09)  POCT Blood Glucose.: 99 mg/dL ( @ 01:59)  POCT Blood Glucose.: 103 mg/dL ( @ 01:02)  POCT Blood Glucose.: 110 mg/dL (09-10 @ 23:02)  POCT Blood Glucose.: 112 mg/dL (09-10 @ 22:06)  POCT Blood Glucose.: 101 mg/dL (09-10 @ 21:07)  POCT Blood Glucose.: 116 mg/dL (09-10 @ 19:57)  POCT Blood Glucose.: 118 mg/dL (09-10 @ 18:43)  POCT Blood Glucose.: 156 mg/dL (09-10 @ 18:10)  POCT Blood Glucose.: 139 mg/dL (09-10 @ 17:06)  POCT Blood Glucose.: 129 mg/dL (09-10 @ 15:58)  POCT Blood Glucose.: 115 mg/dL (09-10 @ 14:58)  POCT Blood Glucose.: 145 mg/dL (09-10 @ 14:28)      Skin per nursing documentation: intact  Edema:  + 3 generalized +4 tara foot and right arm edema     Estimated Needs: based on dosing Wt: 75.2 Kg, with consideration for TPN  Energy: (25-30kcal/kg): 1880-2256kcal   Protein:  (1.4-1.6g protein/kg): 105-135g protein    Previous Nutrition Diagnosis: altered nutrition lab values  Nutrition Diagnosis is: defer at this time     Previous Nutrition Diagnosis: inadequate oral intake  Nutrition Diagnosis is: on going at this time.     Previous Nutrition Diagnosis: acute severe protein calorie malnutrition  Nutrition Diagnosis: remains appropriate, ongoing with pending initiation of TPN    New Nutrition Diagnosis: None     Recommended Interventions:   1. TPN per TPN Team/Nutrition Assessment; being weaned down as tolerated.   2. Continue full liquid diet at this time.   3. Increase Glucerna 1.5 to 30ml/[id number]. Will provide: 1080kcals and 60gm protein. AS tolerated by Pt, and as TPN continues to be weaned, would recommend to increase Glucerna 1.5 towards goal rate of 55ml/[id number]. Goal rate will provide 1980kcals and 108gm protein. (26.3kcals/kg and 1.44gm pro/kg based on dosing Wt: 75.4kg)    4. Trend GI tolerance   5. Monitor BG levels while on TPN and insulin gtt.   6. Trend BG levels, renal indices, LFT's, electrolytes and triglycerides     Monitoring and Evaluation:   Continue to monitor nutritional intake, tolerance to diet prescription, weights, labs, skin integrity  RD remains available upon request and will follow up per protocol  Gabbi Flowers RD, CDN, [facility] Pager #[phone].

## 2020-09-11 NOTE — PROGRESS NOTE ADULT - ASSESSMENT
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 s/p EGD with esophagitis and gastritis. Developed Klebsiella oxytoca bacteremia requiring transfer to CTU. Clinically improving, transferred to [facility], finished 10-day of ceftriaxone, however, developed severe C.diff colitis on Vancomycin 500mg q6h PO and IV Flagyl. He had RHC on 8/25 and found to have high filling pressure so transferred to CTU again.    #C.diff colitis with NORMAN- C.diff PCR detected (8/23). Repeat CT on 8/30 without evidence of toxic megacolon.   - Leukocytosis increased - Methylprednisolone continued at 8 mg daily, ileus resolved, taking more po, improved clinical appearance   - discontinue vancomycin 500 mg per rectum every 6 hours 8/30-->9/11 (pharmD input appreciated)  - cont PO Vanco 500 q 6h  8/23-->  - discontinued IV flagyl (8/24-9/1)  - s/p  ERAVAYCLINE 1 mg/kg (750 mg)  every 12 hours for total 7 days (8/31- 9/6)  - completed IVIG x5 days (8/29-9/1)  - fecal transplant not available   monitor for Cdiff exacerbation     #leukocytosis - 2/2 ongoing c diff vs other infection.  modestly decreased  - Followup on 9/4 blood culture Negative 9/8 Blood culture No Growth to date  - would start abx only for positive blood cultures as abx can worsen current cdiff infection     hemolysis noted -    #Klebsiella oxytoca bacteremia: resolved   Meropenem (8/14-8/17)  Cefepime (8/17-20)  Ceftriaxone (8/14 -->24-)  - blood cultures 8/25 NG  - Followup on 9/4 blood culture Negative; 9/8 BC NGTD    #OI prophylaxis-  -Was previously receiving high dose steroids  -On IV cyclosporine now  -CMV viral load(8/17, 8/26): neg  -Valcyte and Bactrim d/c'ed on 8/17 due to leukopenia  -Galactomann<0.5, Fungitell<31  -Will consider bactrim for PCP ppx when able to tolerated PO    #Anemia- r/o GI bleeding  s/p EGD  EGD 8/17: esophagitis, acute gastritis s/p biopsy, no ulceration  On PPI 40mg daily  - cont to monitor cbc    discussed with CTU PA -increased WBC of concern - with increased po intake, it is hope that TPN can be stopped and line removed  Check surveillance blood cultures  check WBC differential    ID will see this weekend

## 2020-09-11 NOTE — PROGRESS NOTE ADULT - PROBLEM SELECTOR PLAN 2
- We will adjust IV cyclosporine as necessary. Currently at 20 mg/day. Goal level 100 +/- 20.   - Eventual plan to try switch back to tacrolimus as monotherapy  - Everolimus level was 5.3 on 8/31 and has been discontinued.  Repeat level sent 9/8 and pending.  - Continue methylprednisolone 8 mg IV (equivalent to pred 10). This was decreased on 9/5.   - Will need to resume statin and aspirin when stable.

## 2020-09-11 NOTE — PROGRESS NOTE ADULT - PROBLEM SELECTOR PLAN 4
- Generally resolved, but fluctuates a little with diuresis.  - Continue lasix 5 mg/hr as he is responding very well.

## 2020-09-11 NOTE — PROGRESS NOTE ADULT - SUBJECTIVE AND OBJECTIVE BOX
Subjective:    Medications:  albumin human 25% IVPB 50 milliLiter(s) IV Intermittent every 10 minutes  benzocaine 15 mG/menthol 3.6 mG (Sugar-Free) Lozenge 1 Lozenge Oral every 6 hours PRN  chlorhexidine 2% Cloths 1 Application(s) Topical <User Schedule>  cycloSPORINE  (SandIMMUNE) IVPB 20 milliGRAM(s) IV Intermittent every 24 hours  fat emulsion (Fish Oil and Plant Based) 20% Infusion 10.4 mL/Hr IV Continuous <Continuous>  furosemide Infusion 5 mG/Hr IV Continuous <Continuous>  heparin   Injectable 5000 Unit(s) SubCutaneous every 8 hours  hydrALAZINE 50 milliGRAM(s) Oral every 8 hours  insulin regular Infusion 1 Unit(s)/Hr IV Continuous <Continuous>  methylPREDNISolone sodium succinate Injectable 8 milliGRAM(s) IV Push <User Schedule>  pantoprazole  Injectable 40 milliGRAM(s) IV Push daily  Parenteral Nutrition - Adult 1 Each TPN Continuous <Continuous>  vancomycin    Solution 500 milliGRAM(s) Oral every 6 hours  vancomycin  Retention Enema 500 milliGRAM(s) Rectal every 6 hours    Vitals:  T(C): 36.5 (09-11-20 @ 08:00), Max: 36.9 (09-10-20 @ 20:00)  HR: 118 (09-11-20 @ 08:00) (115 - 143)  BP: --  BP(mean): --  ABP: 119/62 (09-11-20 @ 08:00) (76/25 - 150/74)  ABP(mean): 79 (09-11-20 @ 08:00) (41 - 96)  RR: 13 (09-11-20 @ 08:00) (11 - 38)  SpO2: 98% (09-11-20 @ 08:00) (91% - 100%)  CVP(cm H2O): --  CO: --  CI: --  PA: --  PA(mean): --  PCWP: --  SVR: --  PVR: --      I&O's Summary    10 Sep 2020 07:01  -  11 Sep 2020 07:00  --------------------------------------------------------  IN: 3205.4 mL / OUT: 5620 mL / NET: -2414.6 mL    11 Sep 2020 07:01  -  11 Sep 2020 08:38  --------------------------------------------------------  IN: 90.6 mL / OUT: 230 mL / NET: -139.4 mL        Physical Exam:  Appearance: No Acute Distress  HEENT: JVP ___ cm H2O, no HJR  Cardiovascular: RRR, Normal S1 S2, No murmurs/rubs/gallops  Respiratory: Clear to auscultation bilaterally  Gastrointestinal: Soft, Non-tender, non-distended	  Skin: no skin lesions  Neurologic: Non-focal  Extremities: No LE edema, warm and well perfused  Psychiatry: A & O x 3, Mood & affect appropriate        Labs:                        8.2    24.61 )-----------( 124      ( 11 Sep 2020 00:33 )             25.7     09-11    132<L>  |  91<L>  |  92<H>  ----------------------------<  101<H>  4.0   |  29  |  1.27    Ca    8.8      11 Sep 2020 00:31  Phos  3.6     09-11  Mg     1.9     09-11    TPro  6.6  /  Alb  2.7<L>  /  TBili  0.8  /  DBili  0.4<H>  /  AST  89<H>  /  ALT  45  /  AlkPhos  277<H>  09-11 Subjective: seems to be doing better today overall. No abdominal pain, no N/V    Medications:  albumin human 25% IVPB 50 milliLiter(s) IV Intermittent every 10 minutes  benzocaine 15 mG/menthol 3.6 mG (Sugar-Free) Lozenge 1 Lozenge Oral every 6 hours PRN  chlorhexidine 2% Cloths 1 Application(s) Topical <User Schedule>  cycloSPORINE  (SandIMMUNE) IVPB 20 milliGRAM(s) IV Intermittent every 24 hours  fat emulsion (Fish Oil and Plant Based) 20% Infusion 10.4 mL/Hr IV Continuous <Continuous>  furosemide Infusion 5 mG/Hr IV Continuous <Continuous>  heparin   Injectable 5000 Unit(s) SubCutaneous every 8 hours  hydrALAZINE 50 milliGRAM(s) Oral every 8 hours  insulin regular Infusion 1 Unit(s)/Hr IV Continuous <Continuous>  methylPREDNISolone sodium succinate Injectable 8 milliGRAM(s) IV Push <User Schedule>  pantoprazole  Injectable 40 milliGRAM(s) IV Push daily  Parenteral Nutrition - Adult 1 Each TPN Continuous <Continuous>  vancomycin    Solution 500 milliGRAM(s) Oral every 6 hours  vancomycin  Retention Enema 500 milliGRAM(s) Rectal every 6 hours    Vitals:  T(C): 36.5 (09-11-20 @ 08:00), Max: 36.9 (09-10-20 @ 20:00)  HR: 118 (09-11-20 @ 08:00) (115 - 143)  ABP: 119/62 (09-11-20 @ 08:00) (76/25 - 150/74)  ABP(mean): 79 (09-11-20 @ 08:00) (41 - 96)  RR: 13 (09-11-20 @ 08:00) (11 - 38)  SpO2: 98% (09-11-20 @ 08:00) (91% - 100%)        I&O's Summary    10 Sep 2020 07:01  -  11 Sep 2020 07:00  --------------------------------------------------------  IN: 3205.4 mL / OUT: 5620 mL / NET: -2414.6 mL    11 Sep 2020 07:01  -  11 Sep 2020 08:38  --------------------------------------------------------  IN: 90.6 mL / OUT: 230 mL / NET: -139.4 mL        Physical Exam:  General: No distress. Comfortable.  HEENT: EOM intact.  Neck: Neck supple. JVP mildly elevated. No masses  Chest: Clear to auscultation bilaterally.  CV: Tachycardic, regular. Normal S1 and S2. III/VI combined systolic and diastolic murmur heard across precordium. No rubs or gallops.  Abdomen: Distended, but compressible. Non-tender. Prominent bowel sounds.   Extremities: RUE>>LUE edema, diffuse anasarca  Skin: No rashes  Neurology: Alert and oriented. Sensation intact  Psych: Affect normal      Labs:                        8.2    24.61 )-----------( 124      ( 11 Sep 2020 00:33 )             25.7     09-11    132<L>  |  91<L>  |  92<H>  ----------------------------<  101<H>  4.0   |  29  |  1.27    Ca    8.8      11 Sep 2020 00:31  Phos  3.6     09-11  Mg     1.9     09-11    TPro  6.6  /  Alb  2.7<L>  /  TBili  0.8  /  DBili  0.4<H>  /  AST  89<H>  /  ALT  45  /  AlkPhos  277<H>  09-11

## 2020-09-11 NOTE — PROGRESS NOTE ADULT - SUBJECTIVE AND OBJECTIVE BOX
Follow Up:  cdiff    Interval History/ROS: feels better, decreased abd pain, taking more po    Allergies  No Known Allergies    ANTIMICROBIALS:  vancomycin    Solution 500 every 6 hours      OTHER MEDS:  MEDICATIONS  (STANDING):  cycloSPORINE  (SandIMMUNE) IVPB 20 every 24 hours  furosemide Infusion 5 <Continuous>  heparin   Injectable 5000 every 8 hours  hydrALAZINE 50 every 8 hours  insulin regular Infusion 1 <Continuous>  methylPREDNISolone sodium succinate Injectable 8 <User Schedule>  pantoprazole  Injectable 40 daily      Vital Signs Last 24 Hrs  T(C): 36.3 (11 Sep 2020 12:00), Max: 36.9 (10 Sep 2020 20:00)  T(F): 97.4 (11 Sep 2020 12:00), Max: 98.4 (10 Sep 2020 20:00)  HR: 123 (11 Sep 2020 16:00) (116 - 129)  BP: --  BP(mean): --  RR: 12 (11 Sep 2020 16:00) (11 - 38)  SpO2: 100% (11 Sep 2020 16:00) (91% - 100%)    PHYSICAL EXAM:  General: WN/WD NAD, Non-toxic  Neurology: A&Ox3, nonfocal  Respiratory: Clear to auscultation bilaterally  CV: RRR, S1S2, no murmurs, rubs or gallops  Abdominal: Soft, Non-tender, non-distended, normal bowel sounds  Extremities: No edema,   Line Sites: Clear  Skin: No rash                        8.2    24.61 )-----------( 124      ( 11 Sep 2020 00:33 )             25.7   WBC Count: 24.61 (09-11 @ 00:33)  WBC Count: 22.33 (09-10 @ 06:59)  WBC Count: 22.97 (09-10 @ 00:41)  WBC Count: 17.81 (09-09 @ 00:29)  WBC Count: 19.12 (09-08 @ 01:50)  WBC Count: 19.12 (09-07 @ 00:40)    09-11  132<L>  |  91<L>  |  92<H>  ----------------------------<  101<H>  4.0   |  29  |  1.27    Ca    8.8      11 Sep 2020 00:31  Phos  3.6     09-11  Mg     1.9     09-11    TPro  6.6  /  Alb  2.7<L>  /  TBili  0.8  /  DBili  0.4<H>  /  AST  89<H>  /  ALT  45  /  AlkPhos  277<H>  09-11        MICROBIOLOGY:  .Sputum Sputum  09-08-20   No growth at 48 hours  --    No polymorphonuclear leukocytes per low power field  Rare Squamous epithelial cells per low power field  No organisms seen      .Blood Blood-Peripheral  09-08-20   No growth to date.  --  --      .Blood Blood-Peripheral  09-04-20   No Growth Final  --  --      .Blood Triple Lumen BROWN  08-25-20   No Growth Final  --  --      .Blood Blood  08-24-20   No Growth Final  --  --      .Stool Feces  08-23-20   GI PCR Results: NOT detected        .Urine Clean Catch (Midstream)  08-14-20   No growth  --  --      .Blood Blood  08-14-20   No Growth Final  --  --      .Blood Blood-Peripheral  08-13-20   Growth in aerobic and anaerobic bottles: Klebsiella oxytoca/Raoutella  ornithinolytica  See previous culture [id number]  --    Growth in anaerobic bottle: Gram Negative Rods  Growth in aerobic bottle: Gram Negative Rods      .Blood Blood-Peripheral  08-13-20   Growth in anaerobic bottle: Klebsiella oxytoca/Raoutella ornithinolytica    .Urine Clean Catch (Midstream)  08-13-20   >=3 organisms. Probable collection contamination.  --  --    RADIOLOGY:  < from: Xray Chest 1 View- PORTABLE-Routine (09.11.20 @ 03:19) >  IMPRESSION:    The mediastinal cardiac silhouette is unremarkable. Sternotomy. Lines and tubes and support devices are unchanged.    Low lung volumes. Elevated right hemidiaphragm. Small right effusion and right basilar atelectasis. Clear left lung.    No acute osseous finding.    < end of copied text >      Ricky Pope MD; Division of Infectious Disease; Pager: [phone]; nights and weekends: [phone]

## 2020-09-11 NOTE — PROGRESS NOTE ADULT - ASSESSMENT
A/P:  70y M with history of heart transplant in 2/2018 admitted with autoimmune hemolytic anemia and dark stools requiring transfusion. Found to have c diff and abdominal distension on 8/23.  Pt developed Ileus w/ NGT placement and was made NPO.  Ileus resolving and TF & clears slowly being reintroduced.    TPN consulted to assist w/ management in pt who was NPO for a prolonged period of time now w/ inadequate PO nutrition  Pt w/ Acute Severe Protein-Calorie Malnutrition  Plan to wean TPN:  75g AA, 70g Dextrose, & 25g Lipids in 1L total volume        and Glucerna 1.5 TF from 20 to 30cc/h with plan   Pt tolerating Fulls at breakfast today            - HyperTg- improving w/ Lipids at 25g               continue to monitor daily     - Hyperglycemia- continue Insulin in TPN at 50U               Insulin gtt vs FS q6H w/ ISS coverage as per CTU                      to cover Enteral (TF) nutrition              pt on steroids  - Strict Intake and Output- fluid overload              HyperNa - improving- decrease to 1L volume              NaPhos 30mMol              Sheldon resolving  - HypoMg- increase from 12 to 14mEq             Maintaining Mg levels will assist GI motility  - HypoPhos improving w/ 30mMol NaPhos  - Elevated Alk Phos & Lfts- continue to monitor  - Weights as per protocol  - Monitor  CMP, Mg, iCa, & Phos daily  - Check Prealbumin check weekly  - PICC w/ designated port for TPN, maintenance as per protocol  - Continue monitoring as per CTU w/ Surgery, will follow with you, d/w SHELLY Barragan-C  B) [phone]  d/w Ayan Hansen & Marciano A/P:  70y M with history of heart transplant in 2/2018 admitted with autoimmune hemolytic anemia and dark stools requiring transfusion. Found to have c diff and abdominal distension on 8/23.  Pt developed Ileus w/ NGT placement and was made NPO.  Ileus resolving and TF & diet slowly being reintroduced.    TPN consulted to assist w/ management in pt who was NPO for a prolonged period of time now w/ inadequate PO nutrition  Pt w/ Acute Severe Protein-Calorie Malnutrition  Plan to wean TPN:  75g AA, 70g Dextrose, & 25g Lipids in 1L total volume        and Glucerna 1.5 TF increased from 20 to 30cc/h with plan         RD recommends to increase Glucerna 1.5 towards goal rate of 55ml/[id number].             to provide 1980kcals and 108gm protein  Pt tolerating Fulls at breakfast today          - HyperTg- improving w/ Lipids at 25g               continue to monitor daily     - Hyperglycemia- continue Insulin in TPN at 50U               Insulin gtt currently being monitored by CTU                      as pt transitioning off of TPN to TF and pt on steroids  - Strict Intake and Output- fluid overload              HyperNa - improving- decrease to 1L volume              NaPhos 30mMol             Sheldon resolving  - HypoMg- increase from 12 to 14mEq             Maintaining Mg levels will assist GI motility  - HypoPhos improving w/ 30mMol NaPhos  - Elevated Alk Phos & Lfts- continue to monitor  - Weights as per protocol  - Monitor  CMP, Mg, iCa, & Phos daily  - Check Prealbumin check weekly  - PICC w/ designated port for TPN, maintenance as per protocol  - Continue monitoring as per CTU w/ Surgery, will follow with lobito, d/w CTU team    LEWIS StaffordC  (B) [phone]  d/w Ayan Hansen & Marciano

## 2020-09-12 LAB
ALBUMIN SERPL ELPH-MCNC: 2.7 G/DL — LOW (ref 3.3–5)
ALP SERPL-CCNC: 242 U/L — HIGH (ref 40–120)
ALT FLD-CCNC: 48 U/L — HIGH (ref 10–45)
ANION GAP SERPL CALC-SCNC: 11 MMOL/L — SIGNIFICANT CHANGE UP (ref 5–17)
AST SERPL-CCNC: 90 U/L — HIGH (ref 10–40)
BASE EXCESS BLDA CALC-SCNC: 8.8 MMOL/L — HIGH (ref -2–2)
BILIRUB SERPL-MCNC: 0.7 MG/DL — SIGNIFICANT CHANGE UP (ref 0.2–1.2)
BUN SERPL-MCNC: 88 MG/DL — HIGH (ref 7–23)
CALCIUM SERPL-MCNC: 8.8 MG/DL — SIGNIFICANT CHANGE UP (ref 8.4–10.5)
CHLORIDE SERPL-SCNC: 94 MMOL/L — LOW (ref 96–108)
CO2 BLDA-SCNC: 35 MMOL/L — HIGH (ref 22–30)
CO2 SERPL-SCNC: 30 MMOL/L — SIGNIFICANT CHANGE UP (ref 22–31)
CREAT SERPL-MCNC: 1.21 MG/DL — SIGNIFICANT CHANGE UP (ref 0.5–1.3)
CYCLOSPORINE SER-MCNC: 69 NG/ML — LOW (ref 150–400)
G6PD RBC-CCNC: 17.3 U/G HGB — SIGNIFICANT CHANGE UP (ref 7–20.5)
GAS PNL BLDA: SIGNIFICANT CHANGE UP
GAS PNL BLDA: SIGNIFICANT CHANGE UP
GLUCOSE BLDC GLUCOMTR-MCNC: 103 MG/DL — HIGH (ref 70–99)
GLUCOSE BLDC GLUCOMTR-MCNC: 106 MG/DL — HIGH (ref 70–99)
GLUCOSE BLDC GLUCOMTR-MCNC: 108 MG/DL — HIGH (ref 70–99)
GLUCOSE BLDC GLUCOMTR-MCNC: 110 MG/DL — HIGH (ref 70–99)
GLUCOSE BLDC GLUCOMTR-MCNC: 111 MG/DL — HIGH (ref 70–99)
GLUCOSE BLDC GLUCOMTR-MCNC: 113 MG/DL — HIGH (ref 70–99)
GLUCOSE BLDC GLUCOMTR-MCNC: 118 MG/DL — HIGH (ref 70–99)
GLUCOSE BLDC GLUCOMTR-MCNC: 119 MG/DL — HIGH (ref 70–99)
GLUCOSE BLDC GLUCOMTR-MCNC: 121 MG/DL — HIGH (ref 70–99)
GLUCOSE BLDC GLUCOMTR-MCNC: 126 MG/DL — HIGH (ref 70–99)
GLUCOSE BLDC GLUCOMTR-MCNC: 126 MG/DL — HIGH (ref 70–99)
GLUCOSE BLDC GLUCOMTR-MCNC: 127 MG/DL — HIGH (ref 70–99)
GLUCOSE BLDC GLUCOMTR-MCNC: 133 MG/DL — HIGH (ref 70–99)
GLUCOSE BLDC GLUCOMTR-MCNC: 133 MG/DL — HIGH (ref 70–99)
GLUCOSE BLDC GLUCOMTR-MCNC: 137 MG/DL — HIGH (ref 70–99)
GLUCOSE BLDC GLUCOMTR-MCNC: 140 MG/DL — HIGH (ref 70–99)
GLUCOSE BLDC GLUCOMTR-MCNC: 145 MG/DL — HIGH (ref 70–99)
GLUCOSE BLDC GLUCOMTR-MCNC: 146 MG/DL — HIGH (ref 70–99)
GLUCOSE BLDC GLUCOMTR-MCNC: 149 MG/DL — HIGH (ref 70–99)
GLUCOSE BLDC GLUCOMTR-MCNC: 149 MG/DL — HIGH (ref 70–99)
GLUCOSE BLDC GLUCOMTR-MCNC: 156 MG/DL — HIGH (ref 70–99)
GLUCOSE BLDC GLUCOMTR-MCNC: 157 MG/DL — HIGH (ref 70–99)
GLUCOSE BLDC GLUCOMTR-MCNC: 99 MG/DL — SIGNIFICANT CHANGE UP (ref 70–99)
GLUCOSE SERPL-MCNC: 99 MG/DL — SIGNIFICANT CHANGE UP (ref 70–99)
HCO3 BLDA-SCNC: 33 MMOL/L — HIGH (ref 21–29)
HCT VFR BLD CALC: 25.9 % — LOW (ref 39–50)
HGB BLD-MCNC: 8.1 G/DL — LOW (ref 13–17)
HOROWITZ INDEX BLDA+IHG-RTO: 32 — SIGNIFICANT CHANGE UP
MAGNESIUM SERPL-MCNC: 2.3 MG/DL — SIGNIFICANT CHANGE UP (ref 1.6–2.6)
MCHC RBC-ENTMCNC: 31.3 GM/DL — LOW (ref 32–36)
MCHC RBC-ENTMCNC: 31.3 PG — SIGNIFICANT CHANGE UP (ref 27–34)
MCV RBC AUTO: 100 FL — SIGNIFICANT CHANGE UP (ref 80–100)
NRBC # BLD: 8 /100 WBCS — HIGH (ref 0–0)
PCO2 BLDA: 49 MMHG — HIGH (ref 32–46)
PH BLDA: 7.45 — SIGNIFICANT CHANGE UP (ref 7.35–7.45)
PHOSPHATE SERPL-MCNC: 4.2 MG/DL — SIGNIFICANT CHANGE UP (ref 2.5–4.5)
PLATELET # BLD AUTO: 123 K/UL — LOW (ref 150–400)
PO2 BLDA: 114 MMHG — HIGH (ref 74–108)
POTASSIUM BLDA-SCNC: 4.4 MMOL/L — SIGNIFICANT CHANGE UP (ref 3.5–5.3)
POTASSIUM SERPL-MCNC: 4.4 MMOL/L — SIGNIFICANT CHANGE UP (ref 3.5–5.3)
POTASSIUM SERPL-SCNC: 4.4 MMOL/L — SIGNIFICANT CHANGE UP (ref 3.5–5.3)
PROT SERPL-MCNC: 6.4 G/DL — SIGNIFICANT CHANGE UP (ref 6–8.3)
RBC # BLD: 2.59 M/UL — LOW (ref 4.2–5.8)
RBC # FLD: 25.3 % — HIGH (ref 10.3–14.5)
SAO2 % BLDA: 99 % — HIGH (ref 92–96)
SODIUM SERPL-SCNC: 135 MMOL/L — SIGNIFICANT CHANGE UP (ref 135–145)
TRIGL SERPL-MCNC: 188 MG/DL — HIGH (ref 10–149)
WBC # BLD: 21.29 K/UL — HIGH (ref 3.8–10.5)
WBC # FLD AUTO: 21.29 K/UL — HIGH (ref 3.8–10.5)

## 2020-09-12 PROCEDURE — 99291 CRITICAL CARE FIRST HOUR: CPT

## 2020-09-12 PROCEDURE — 99231 SBSQ HOSP IP/OBS SF/LOW 25: CPT

## 2020-09-12 PROCEDURE — 99233 SBSQ HOSP IP/OBS HIGH 50: CPT

## 2020-09-12 RX ORDER — ACETAMINOPHEN 500 MG
650 TABLET ORAL ONCE
Refills: 0 | Status: COMPLETED | OUTPATIENT
Start: 2020-09-12 | End: 2020-09-12

## 2020-09-12 RX ORDER — DEXTROSE 50 % IN WATER 50 %
12.5 SYRINGE (ML) INTRAVENOUS ONCE
Refills: 0 | Status: DISCONTINUED | OUTPATIENT
Start: 2020-09-12 | End: 2020-10-02

## 2020-09-12 RX ORDER — SODIUM CHLORIDE 9 MG/ML
1000 INJECTION, SOLUTION INTRAVENOUS
Refills: 0 | Status: DISCONTINUED | OUTPATIENT
Start: 2020-09-12 | End: 2020-10-02

## 2020-09-12 RX ORDER — DEXTROSE 50 % IN WATER 50 %
25 SYRINGE (ML) INTRAVENOUS ONCE
Refills: 0 | Status: DISCONTINUED | OUTPATIENT
Start: 2020-09-12 | End: 2020-10-02

## 2020-09-12 RX ORDER — DEXTROSE 50 % IN WATER 50 %
15 SYRINGE (ML) INTRAVENOUS ONCE
Refills: 0 | Status: DISCONTINUED | OUTPATIENT
Start: 2020-09-12 | End: 2020-10-02

## 2020-09-12 RX ORDER — GLUCAGON INJECTION, SOLUTION 0.5 MG/.1ML
1 INJECTION, SOLUTION SUBCUTANEOUS ONCE
Refills: 0 | Status: DISCONTINUED | OUTPATIENT
Start: 2020-09-12 | End: 2020-10-02

## 2020-09-12 RX ADMIN — INSULIN HUMAN 1 UNIT(S)/HR: 100 INJECTION, SOLUTION SUBCUTANEOUS at 21:01

## 2020-09-12 RX ADMIN — HEPARIN SODIUM 5000 UNIT(S): 5000 INJECTION INTRAVENOUS; SUBCUTANEOUS at 06:00

## 2020-09-12 RX ADMIN — CYCLOSPORINE 2.1 MILLIGRAM(S): 100 CAPSULE ORAL at 11:26

## 2020-09-12 RX ADMIN — HEPARIN SODIUM 5000 UNIT(S): 5000 INJECTION INTRAVENOUS; SUBCUTANEOUS at 13:29

## 2020-09-12 RX ADMIN — Medication 500 MILLIGRAM(S): at 21:01

## 2020-09-12 RX ADMIN — Medication 2.5 MG/HR: at 07:00

## 2020-09-12 RX ADMIN — Medication 500 MILLIGRAM(S): at 14:02

## 2020-09-12 RX ADMIN — Medication 500 MILLIGRAM(S): at 03:00

## 2020-09-12 RX ADMIN — Medication 50 MILLIGRAM(S): at 10:12

## 2020-09-12 RX ADMIN — Medication 50 MILLIGRAM(S): at 13:26

## 2020-09-12 RX ADMIN — CHLORHEXIDINE GLUCONATE 1 APPLICATION(S): 213 SOLUTION TOPICAL at 06:00

## 2020-09-12 RX ADMIN — HEPARIN SODIUM 5000 UNIT(S): 5000 INJECTION INTRAVENOUS; SUBCUTANEOUS at 21:01

## 2020-09-12 RX ADMIN — Medication 500 MILLIGRAM(S): at 09:00

## 2020-09-12 RX ADMIN — INSULIN HUMAN 1 UNIT(S)/HR: 100 INJECTION, SOLUTION SUBCUTANEOUS at 07:00

## 2020-09-12 RX ADMIN — Medication 8 MILLIGRAM(S): at 06:00

## 2020-09-12 RX ADMIN — Medication 650 MILLIGRAM(S): at 08:00

## 2020-09-12 RX ADMIN — PANTOPRAZOLE SODIUM 40 MILLIGRAM(S): 20 TABLET, DELAYED RELEASE ORAL at 11:39

## 2020-09-12 RX ADMIN — Medication 50 MILLIGRAM(S): at 21:01

## 2020-09-12 RX ADMIN — Medication 650 MILLIGRAM(S): at 08:30

## 2020-09-12 NOTE — PROGRESS NOTE ADULT - ASSESSMENT
A/P:  70y M with history of heart transplant in 2/2018 admitted with autoimmune hemolytic anemia and dark stools requiring transfusion. Found to have c diff and abdominal distension on 8/23.  Pt developed Ileus w/ NGT placement and was made NPO.  Ileus resolving     - TPN to stop today  - advance tube feeds to goal  - continue PO diet, as intake increases, can adjust TF accordingly  - care per CTU  - discussed with SHELLY Nava    TPN pager [phone], spectra [phone]  M-F 8A-4P, Weekends and holidays 8A-12P  discussed with Dr. Tariq and Dr. KELLY Hansen

## 2020-09-12 NOTE — PROGRESS NOTE ADULT - SUBJECTIVE AND OBJECTIVE BOX
[facility] NUTRITION SUPPORT / TPN -- FOLLOW UP NOTE  --------------------------------------------------------------------------------    24 hour events/subjective:  No acute overnight events.  Tube feeds advanced to 30cc/hour which he is tolerating.  No N/V.  Tolerating small amounts of full liquids.  +flatus, +BM    Diet:  Diet, Full Liquid:   Consistent Carbohydrate No Snacks (CSTCHO)  Tube Feeding Modality: Nasogastric  Glucerna 1.5 Sohail (GLUCERNA1.5RTH)  Total Volume for 24 Hours (mL): 1320  Continuous  Starting Tube Feed Rate mL per Hour: 40  Until Goal Tube Feed Rate (mL per Hour): 55  Tube Feed Duration (in Hours): 24  Tube Feed Start Time: 10:00 (09-12-20 @ 10:08)      PAST HISTORY  --------------------------------------------------------------------------------  No significant changes to PMH, PSH, FHx, SHx, unless otherwise noted    ALLERGIES & MEDICATIONS  --------------------------------------------------------------------------------  Allergies    No Known Allergies    Intolerances      Standing Inpatient Medications  albumin human 25% IVPB 50 milliLiter(s) IV Intermittent every 10 minutes  chlorhexidine 2% Cloths 1 Application(s) Topical <User Schedule>  cycloSPORINE  (SandIMMUNE) IVPB 20 milliGRAM(s) IV Intermittent every 24 hours  dextrose 5%. 1000 milliLiter(s) IV Continuous <Continuous>  dextrose 50% Injectable 25 Gram(s) IV Push once  dextrose 50% Injectable 12.5 Gram(s) IV Push once  dextrose 50% Injectable 25 Gram(s) IV Push once  furosemide Infusion 5 mG/Hr IV Continuous <Continuous>  heparin   Injectable 5000 Unit(s) SubCutaneous every 8 hours  hydrALAZINE 50 milliGRAM(s) Oral every 8 hours  insulin regular Infusion 1 Unit(s)/Hr IV Continuous <Continuous>  methylPREDNISolone sodium succinate Injectable 8 milliGRAM(s) IV Push <User Schedule>  pantoprazole  Injectable 40 milliGRAM(s) IV Push daily  Parenteral Nutrition - Adult 1 Each TPN Continuous <Continuous>  vancomycin    Solution 500 milliGRAM(s) Oral every 6 hours    PRN Inpatient Medications  benzocaine 15 mG/menthol 3.6 mG (Sugar-Free) Lozenge 1 Lozenge Oral every 6 hours PRN  dextrose 40% Gel 15 Gram(s) Oral once PRN  glucagon  Injectable 1 milliGRAM(s) IntraMuscular once PRN      VITALS/PHYSICAL EXAM  --------------------------------------------------------------------------------  T(C): 36.9 (09-11-20 @ 20:00), Max: 36.9 (09-11-20 @ 16:00)  HR: 126 (09-11-20 @ 22:00) (116 - 126)  BP: --  RR: 18 (09-11-20 @ 22:00) (11 - 18)  SpO2: 100% (09-11-20 @ 22:00) (100% - 100%)  Wt(kg): --        09-11-20 @ 07:01  -  09-12-20 @ 07:00  --------------------------------------------------------  IN: 2221.9 mL / OUT: 2650 mL / NET: -428.1 mL    09-12-20 @ 07:01 - 09-12-20 @ 11:02  --------------------------------------------------------  IN: 1053 mL / OUT: 2635 mL / NET: -1582 mL          PHYSICAL EXAM:  GEN:  guarded but stable, WD/WN/WG, (+)KO TF  HEENT: NC/AT, PERRL, mucosa moist & throat clear, no trachea midline w/ neck supple  GI: (+) BS, softly distended, nontender   MSK:  FROM x4, no deformities  VASCULAR: Equally warm, no cyanosis, clubbing,        Lt IJ TLC &  LUE PICC w/ dressing c/d/i,         BLE mild edema equal,  R>LUE edema- soft nontender w/o cord or erythema  Neurology; no focal deficits, weakened strength & sensation grossly intact  Psych: normal affect  Skin: warm,  moist, & w/ good turgor          LABS/STUDIES  --------------------------------------------------------------------------------              8.1    21.29 >-----------<  123      [09-12-20 @ 00:39]              25.9     135  |  94  |  88  ----------------------------<  99      [09-12-20 @ 00:39]  4.4   |  30  |  1.21        Ca     8.8     [09-12-20 @ 00:39]      Mg     2.3     [09-12-20 @ 00:39]      Phos  4.2     [09-12-20 @ 00:39]    TPro  6.4  /  Alb  2.7  /  TBili  0.7  /  DBili  x   /  AST  90  /  ALT  48  /  AlkPhos  242  [09-12-20 @ 00:39]              [09-11-20 @ 00:31]    Ca ionizedBlood Gas Arterial - Calcium, Ionized: 1.15 mmoL/L (09-12-20 @ 00:40)  Blood Gas Arterial - Calcium, Ionized: 1.15 mmoL/L (09-11-20 @ 15:02)    Creatinine Trend:  POC glucoseGlucose, Serum: 99 mg/dL (09-12-20 @ 00:39)  CAPILLARY BLOOD GLUCOSE  152 (11 Sep 2020 22:00)  141 (11 Sep 2020 21:00)  148 (11 Sep 2020 20:00)      POCT Blood Glucose.: 118 mg/dL (12 Sep 2020 10:00)  POCT Blood Glucose.: 118 mg/dL (12 Sep 2020 09:08)  POCT Blood Glucose.: 113 mg/dL (12 Sep 2020 07:58)  POCT Blood Glucose.: 146 mg/dL (12 Sep 2020 06:44)  POCT Blood Glucose.: 133 mg/dL (12 Sep 2020 06:04)  POCT Blood Glucose.: 145 mg/dL (12 Sep 2020 05:27)  POCT Blood Glucose.: 118 mg/dL (12 Sep 2020 04:08)  POCT Blood Glucose.: 99 mg/dL (12 Sep 2020 02:54)  POCT Blood Glucose.: 103 mg/dL (12 Sep 2020 02:09)  POCT Blood Glucose.: 106 mg/dL (12 Sep 2020 01:31)  POCT Blood Glucose.: 110 mg/dL (11 Sep 2020 23:59)  POCT Blood Glucose.: 127 mg/dL (11 Sep 2020 23:16)  POCT Blood Glucose.: 152 mg/dL (11 Sep 2020 21:57)  POCT Blood Glucose.: 141 mg/dL (11 Sep 2020 21:02)  POCT Blood Glucose.: 148 mg/dL (11 Sep 2020 19:49)  POCT Blood Glucose.: 119 mg/dL (11 Sep 2020 18:55)  POCT Blood Glucose.: 96 mg/dL (11 Sep 2020 18:02)  POCT Blood Glucose.: 112 mg/dL (11 Sep 2020 17:09)  POCT Blood Glucose.: 182 mg/dL (11 Sep 2020 16:16)  POCT Blood Glucose.: 134 mg/dL (11 Sep 2020 13:55)  POCT Blood Glucose.: 118 mg/dL (11 Sep 2020 12:28)  POCT Blood Glucose.: 139 mg/dL (11 Sep 2020 11:07)    PrealbuminPrealbumin, Serum: 22 mg/dL (09-08-20 @ 16:22)  Prealbumin, Serum: 10 mg/dL (09-02-20 @ 08:12)  Prealbumin, Serum: 24 mg/dL (08-25-20 @ 15:10)    Triglycerides

## 2020-09-12 NOTE — PROGRESS NOTE ADULT - SUBJECTIVE AND OBJECTIVE BOX
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 Patient was treated w/ prednisone and Rituximab. Tacro was discontinued and patient was also transitioned to everolimus  Admitted  6/16-6/19  for hyperglycemia.  Reported to transplant team having one done black tarry stool-  instructed to  present to [facility] for hemolytic anemia. Patient has been following with Hematology outpatient.  Denies CP  N/V diarrhea SOB. (11 Aug 2020 20:08)      Surgery/Hospital course:  8/11 Admitted to [facility] with symptomatic anemia  8/13 EGD for investigation of tarry stools  8/15 SB capsule: stomach & SB lesions, likely ulcers.  8/17 EGD/push enteroscopy w/ angioectasias/erosions in small bowel. Gastropathy and esophagitis. Ulcer seen on VCE most likely scope trauma.    8/18 VSS transferred back to floor.   8/20 VS 9.0/28.4 stable Gastric biopsy results LA Grade A esophagitis.  - Acute gastritis. Biopsies taken to r/o CMV, No ulceration seen despite finding on capsule endoscopy - likely 2/2 scope  trauma that has since resolved. Pathology pending.  8/21 VSS H/H  8.1/25.7  trending down will monitor prednisone taper 30 mg today. Procardia 120 initiated today RHC biopsy Monday.   8/22 VVS; Patient reports loose stools x 2-3 days with 1 episode today.  Stool sent for C-dif and GI PCR. Abdominal X-ray revealed: dilated loops of bowel. Abdomen distended.  Patient denies N/V. GI called to re-evaluate. Continue with current medication regimen.  Awaiting for upcoming cardiac biopsy on Monday 8/24.  8/23   vss    creat up to 2.28 ,  repeating CMP,  TTE ordered  Bladder scan   8/24   cardiac bx cancelled   elev creat  to 2.74   cardio renal cslt called,    + CDIF   inc vanco to 500 q6   ID following  8/25 VSS: RHC today as per transplant team; transfuse 2 units PRBC today as per Dr. Viramontes; continue vanco for cdiff; transfer patient to CTU after cath today   8/26. Dieretic challenge with good response   8/27: Downgraded to the floor then upgraded to the CTU again for concerning of abd distention   8/28 CT ABD & Pelvis reveals pancolitis  8/30: repeat CT scan of abd, gen surgery called to re-evaluated pt.  8/31: RUE duplex negative for DVT  9/4 NGT clamped, minimal residual. NGT removed. started sips   9/6 tolerating clear liquid diet  9/8 Bronch  9/9 1 prbc   9/10 increased tube feeds to 20cc/hr and tpn decreased to 63cc/hr from 125cc/hr     Today:    REVIEW OF SYSTEMS:  CONSTITUTIONAL: No weakness, fevers or chills  EYES/ENT: No visual changes  NECK: No pain or stiffness  RESPIRATORY: No cough, wheezing, hemoptysis; No shortness of breath  CARDIOVASCULAR: No chest pain or palpitations  GASTROINTESTINAL: + Abdominal distention. No epigastric pain. No nausea, vomiting, or hematemesis.  GENITOURINARY: No dysuria, frequency or hematuria  NEUROLOGICAL: No numbness or weakness  SKIN: No itching, rashes     Physical Exam:  Vital Signs Last 24 Hrs  T(C): 36.9 (11 Sep 2020 20:00), Max: 36.9 (11 Sep 2020 16:00)  T(F): 98.5 (11 Sep 2020 20:00), Max: 98.5 (11 Sep 2020 16:00)  HR: 126 (11 Sep 2020 22:00) (116 - 126)  BP: --  BP(mean): --  RR: 18 (11 Sep 2020 22:00) (11 - 18)  SpO2: 100% (11 Sep 2020 22:00) (100% - 100%)  Gen:  Awake, alert   CNS: non focal 	  Neck: no JVD  RES : Diminished breath sounds B/L , crackles at bases, no wheezing                    CVS: Sinus tachycardia. Normal S1/S2  Chest: Dressing C/D/I  Abd: Soft, distended. Bowel sounds present.  Skin: No rash.  Ext: +4 pitting edema in RUE, +2 edema b/l LE, +2 pitting edema in LUE.  Lines: Right Radial [place] 9/8, Left Triple IJ intro 9/03    ============================I/O===========================   I&O's Detail    11 Sep 2020 07:01  -  12 Sep 2020 07:00  --------------------------------------------------------  IN:    Fat Emulsion (Fish Oil &amp; Plant Based) 20% Infusion: 62.4 mL    Furosemide: 37.5 mL    Glucerna 1.5: 420 mL    Insulin: 51.5 mL    IV PiggyBack: 31.5 mL    Oral Fluid: 800 mL    TPN (Total Parenteral Nutrition): 819 mL  Total IN: 2221.9 mL    OUT:    Fat Emulsion (Fish Oil &amp; Plant Based) 20% Infusion: 0 mL    Indwelling Catheter - Urethral (mL): 2650 mL  Total OUT: 2650 mL    Total NET: -428.1 mL      12 Sep 2020 07:01  -  12 Sep 2020 10:46  --------------------------------------------------------  IN:    Other (mL): 1053 mL  Total IN: 1053 mL    OUT:    Other (mL): 2635 mL  Total OUT: 2635 mL    Total NET: -1582 mL        ============================ LABS =========================                        8.1    21.29 )-----------( 123      ( 12 Sep 2020 00:39 )             25.9     09-12    135  |  94<L>  |  88<H>  ----------------------------<  99  4.4   |  30  |  1.21    Ca    8.8      12 Sep 2020 00:39  Phos  4.2     09-12  Mg     2.3     09-12    TPro  6.4  /  Alb  2.7<L>  /  TBili  0.7  /  DBili  x   /  AST  90<H>  /  ALT  48<H>  /  AlkPhos  242<H>  09-12    LIVER FUNCTIONS - ( 12 Sep 2020 00:39 )  Alb: 2.7 g/dL / Pro: 6.4 g/dL / ALK PHOS: 242 U/L / ALT: 48 U/L / AST: 90 U/L / GGT: x             ABG - ( 12 Sep 2020 00:40 )  pH, Arterial: 7.43  pH, Blood: x     /  pCO2: 52    /  pO2: 101   / HCO3: 34    / Base Excess: 9.1   /  SaO2: 98                  ======================Micro/Rad/Cardio=================  Culture: Reviewed   CXR: Reviewed  Echo: Reviewed  ======================================================  PAST MEDICAL & SURGICAL HISTORY:  H/O hemolytic anemia  H/O autoimmune hemolytic anemia  Knee pain, right  HLD (hyperlipidemia)  Former smoker  DVT of upper extremity (deep vein thrombosis)  Hepatitis C virus  GIB (gastrointestinal bleeding)  Ventricular fibrillation  s/p AICD  PAF (paroxysmal atrial fibrillation)  on xarelto  Non-Ischemic Cardiomyopathy  SVT (Supraventricular Tachycardia)  HTN  CHF (Congestive Heart Failure)  S/P right heart catheterization  biopsy multiple  H/O heart transplant  2/2018  Status post left hip replacement  History of Prior Ablation Treatment  for afib  AICD (Automatic Cardioverter/Defibrillator) Present  [employer] with 1 [employer] [place] lead4/1/09- explanted and replaced with Medtronic 2 leads on 9/2/09      ====================ASSESSMENT ==============  Heart transplant 2/2018 for ACC/AHA stage D NICM   Resolved GI bleed, Melena s/p EGD  Supraventricular tachycardia  Severe hypertension  Hyperlactatemia acidosis, resolved  Acute respiratory failure, resolved  Leukopenia- resolved  Acute blood loss anemia, stable   Abdominal distention  ileus  Leukocytosis  CKD Stage III  C. diff diarrhea  C. diff colitis  Klebsiella oxytoca bacteremia  Sepsis  Azotemia 2/2 CKD and Steroids   Pancolitis  Macrocytic hemolytic anemia with (+) IGg cornel     Plan:  ====================== NEUROLOGY=====================  Nonfocal, no focal deficits, continue to monitor neurological status per ICU protocol.   Continue physical therapy as indicated - OOB to chair today.    ==================== RESPIRATORY======================  Persistent RLL Collapse on CXR requiring bronchoscopy on 9/08   Comfortable on supplemental oxygen therapy via 4L NC.  Encourage incentive spirometry, continue pulse ox monitoring, follow ABGs. Encourage chest PT.      ====================CARDIOVASCULAR==================  S/P Heart transplant 2/2018 for ACC/AHA stage D NICM, Transplant team following.   Off everolimus. Off Cellcept while on high dose steroids.   ASA on hold due to hx of GI bleed / ulceration.  Continue invasive hemodynamic monitoring via Arterial Line  Continue Hydralazine 50mg PO q8 for a goal MAP of 70-80  Diuresis with Lasix 5mg/hr infusion to maintain net neg fluid balance    furosemide Infusion 5 mG/Hr (2.5 mL/Hr) IV Continuous <Continuous>  hydrALAZINE 50 milliGRAM(s) Oral every 8 hours    ===================HEMATOLOGIC/ONC ===================  Anemia S/P multiple blood transfusions.   Continue closely monitoring H&H and transfuse prn.   S/P EGD, found to have PUD.    Hx of Autoimmune hemolytic anemia.  Patient with (+) Igg Cornel,  f/u am hemolysis labs. f/u G6PD     Continue SQ Heparin for DVT prophylaxis,  heparin   Injectable 5000 Unit(s) SubCutaneous every 8 hours  ===================== RENAL =========================  Anasarca. NORMAN on CKD Stage III.   Diuresis with Lasix 5mg/hr Infusion as per HF recommendations   Monitor BUN/Cr, I&Os, urine output via Rosas    ==================== GASTROINTESTINAL===================  Patient with ileus in setting of C.Diff- NGT clamped 9/4 for 4 hrs, no residual. NGT removed, started on sips, c/w TPN for now  Monitor BMs and flatus.   CT A/P on 8/30 with Pancolitis and small volume abdominopelvic ascites, not significantly changed since 8/20/2020.  General surgery following - continue serial abdominal exams. No surgical intervention at this time.     GI prophylaxis, pantoprazole  Injectable 40 milliGRAM(s) IV Push daily  Parenteral Nutrition - Adult 1 Each (42 mL/Hr) TPN Continuous <Continuous>  =======================ENDOCRINE =====================  Stress hyperglycemia, metabolic instability, requiring glucose control with insulin gtt, titrate as per insulin drip protocol along with hourly glucose checks.     glucagon  Injectable 1 milliGRAM(s) IntraMuscular once PRN Glucose LESS THAN 70 milligrams/deciliter  insulin regular Infusion 1 Unit(s)/Hr (1 mL/Hr) IV Continuous <Continuous>  ========================INFECTIOUS DISEASE================  BCx 8/13: (+)Klebsiella bacteremia, has since been cleared.  Clostridium difficile PCR 8/23: (+), continue with vancomycin PO, can dc rectal Vanco   - S/P 5 days of IVIG (8/29-9/1)  - S/P Eravaycline (8/31- 9/6)  - S/P IV flagyl discontinued (8/24-9/1)  - S/P Rectal Vanco (8/23-9/11)  Leukocytosis, WBC at 21 this AM  Transplant prophylaxis with Posaconazole and Atovaquone on hold due to ileus, continue Cyclosporine infusion and high dose steroids  ID, Dr. Lujan following, no need for additional antibiotics at this time, monitor for now.   8/25, 9/04, 9/08 Blood cultures all Negative.     vancomycin    Solution 500 milliGRAM(s) Oral every 6 hours  methylPREDNISolone sodium succinate Injectable 8 milliGRAM(s) IV Push <User Schedule>    Patient requires continuous monitoring with bedside rhythm monitoring, arterial line, pulse oximetry, ventilator monitoring and intermittent blood gas analysis.  Care plan discussed with ICU care team.  Patient remained critical; required more than usual post op care; I have spent 35 minutes providing non-routine post op care, revaluated multiple times during the day.    By signing my name below, I, Jenni Fonseca, attest that this documentation has been prepared under the direction and in the presence of SHELLY Ennis.  Electronically signed: Ab Llamas, 09-12-20 @ 10:46    I, SHELLY Ennis, personally performed the services described in this documentation. all medical record entries made by the ab were at my direction and in my presence. 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 Patient was treated w/ prednisone and Rituximab. Tacro was discontinued and patient was also transitioned to everolimus  Admitted  6/16-6/19  for hyperglycemia.  Reported to transplant team having one done black tarry stool-  instructed to  present to [facility] for hemolytic anemia. Patient has been following with Hematology outpatient.  Denies CP  N/V diarrhea SOB. (11 Aug 2020 20:08)      Surgery/Hospital course:  8/11 Admitted to [facility] with symptomatic anemia  8/13 EGD for investigation of tarry stools  8/15 SB capsule: stomach & SB lesions, likely ulcers.  8/17 EGD/push enteroscopy w/ angioectasias/erosions in small bowel. Gastropathy and esophagitis. Ulcer seen on VCE most likely scope trauma.    8/18 VSS transferred back to floor.   8/20 VS 9.0/28.4 stable Gastric biopsy results LA Grade A esophagitis.  - Acute gastritis. Biopsies taken to r/o CMV, No ulceration seen despite finding on capsule endoscopy - likely 2/2 scope  trauma that has since resolved. Pathology pending.  8/21 VSS H/H  8.1/25.7  trending down will monitor prednisone taper 30 mg today. Procardia 120 initiated today RHC biopsy Monday.   8/22 VVS; Patient reports loose stools x 2-3 days with 1 episode today.  Stool sent for C-dif and GI PCR. Abdominal X-ray revealed: dilated loops of bowel. Abdomen distended.  Patient denies N/V. GI called to re-evaluate. Continue with current medication regimen.  Awaiting for upcoming cardiac biopsy on Monday 8/24.  8/23   vss    creat up to 2.28 ,  repeating CMP,  TTE ordered  Bladder scan   8/24   cardiac bx cancelled   elev creat  to 2.74   cardio renal cslt called,    + CDIF   inc vanco to 500 q6   ID following  8/25 VSS: RHC today as per transplant team; transfuse 2 units PRBC today as per Dr. Viramontes; continue vanco for cdiff; transfer patient to CTU after cath today   8/26. Dieretic challenge with good response   8/27: Downgraded to the floor then upgraded to the CTU again for concerning of abd distention   8/28 CT ABD & Pelvis reveals pancolitis  8/30: repeat CT scan of abd, gen surgery called to re-evaluated pt.  8/31: RUE duplex negative for DVT  9/4 NGT clamped, minimal residual. NGT removed. started sips   9/6 tolerating clear liquid diet  9/8 Bronch  9/9 1 prbc   9/10 increased tube feeds to 20cc/hr and tpn decreased to 63cc/hr from 125cc/hr     Today:  Plan to discontinue TPN once this bag completes this afternoon as patient now tolerating feeds from a GI perspective. Will increase TF to 40cc with goal of 55. Plan to dc PICC line once TPN completes.     REVIEW OF SYSTEMS:  CONSTITUTIONAL: No weakness, fevers or chills  EYES/ENT: No visual changes  NECK: No pain or stiffness  RESPIRATORY: No cough, wheezing, hemoptysis; No shortness of breath  CARDIOVASCULAR: No chest pain or palpitations  GASTROINTESTINAL: + Abdominal distention. No epigastric pain. No nausea, vomiting, or hematemesis.  GENITOURINARY: No dysuria, frequency or hematuria  NEUROLOGICAL: No numbness or weakness  SKIN: No itching, rashes. + RUE swelling    Physical Exam:  Vital Signs Last 24 Hrs  T(C): 36.9 (11 Sep 2020 20:00), Max: 36.9 (11 Sep 2020 16:00)  T(F): 98.5 (11 Sep 2020 20:00), Max: 98.5 (11 Sep 2020 16:00)  HR: 126 (11 Sep 2020 22:00) (116 - 126)  BP: --  BP(mean): --  RR: 18 (11 Sep 2020 22:00) (11 - 18)  SpO2: 100% (11 Sep 2020 22:00) (100% - 100%)    Gen:  Awake, alert   CNS: non focal 	  Neck: no JVD  RES : Diminished breath sounds B/L , crackles at bases, no wheezing                    CVS: Sinus tachycardia. Normal S1/S2  Abd: Soft, distended. Bowel sounds present.  Skin: No rash.  Ext: +4 pitting edema in RUE, +2 edema b/l LE, +2 pitting edema in LUE.  Lines: Right Radial [place] 9/8, Left Triple IJ intro 9/03, LUE PICC 9/2     ============================I/O===========================   I&O's Detail    11 Sep 2020 07:01  -  12 Sep 2020 07:00  --------------------------------------------------------  IN:    Fat Emulsion (Fish Oil &amp; Plant Based) 20% Infusion: 62.4 mL    Furosemide: 37.5 mL    Glucerna 1.5: 420 mL    Insulin: 51.5 mL    IV PiggyBack: 31.5 mL    Oral Fluid: 800 mL    TPN (Total Parenteral Nutrition): 819 mL  Total IN: 2221.9 mL    OUT:    Fat Emulsion (Fish Oil &amp; Plant Based) 20% Infusion: 0 mL    Indwelling Catheter - Urethral (mL): 2650 mL  Total OUT: 2650 mL    Total NET: -428.1 mL      12 Sep 2020 07:01  -  12 Sep 2020 10:46  --------------------------------------------------------  IN:    Other (mL): 1053 mL  Total IN: 1053 mL    OUT:    Other (mL): 2635 mL  Total OUT: 2635 mL    Total NET: -1582 mL    ============================ LABS =========================                        8.1    21.29 )-----------( 123      ( 12 Sep 2020 00:39 )             25.9     09-12    135  |  94<L>  |  88<H>  ----------------------------<  99  4.4   |  30  |  1.21    Ca    8.8      12 Sep 2020 00:39  Phos  4.2     09-12  Mg     2.3     09-12    TPro  6.4  /  Alb  2.7<L>  /  TBili  0.7  /  DBili  x   /  AST  90<H>  /  ALT  48<H>  /  AlkPhos  242<H>  09-12    LIVER FUNCTIONS - ( 12 Sep 2020 00:39 )  Alb: 2.7 g/dL / Pro: 6.4 g/dL / ALK PHOS: 242 U/L / ALT: 48 U/L / AST: 90 U/L / GGT: x             ABG - ( 12 Sep 2020 00:40 )  pH, Arterial: 7.43  pH, Blood: x     /  pCO2: 52    /  pO2: 101   / HCO3: 34    / Base Excess: 9.1   /  SaO2: 98        ======================Micro/Rad/Cardio=================  Culture: Reviewed   CXR: Reviewed  Echo: Reviewed  ======================================================  PAST MEDICAL & SURGICAL HISTORY:  H/O hemolytic anemia  H/O autoimmune hemolytic anemia  Knee pain, right  HLD (hyperlipidemia)  Former smoker  DVT of upper extremity (deep vein thrombosis)  Hepatitis C virus  GIB (gastrointestinal bleeding)  Ventricular fibrillation  s/p AICD  PAF (paroxysmal atrial fibrillation)  on xarelto  Non-Ischemic Cardiomyopathy  SVT (Supraventricular Tachycardia)  HTN  CHF (Congestive Heart Failure)  S/P right heart catheterization  biopsy multiple  H/O heart transplant  2/2018  Status post left hip replacement  History of Prior Ablation Treatment  for afib  AICD (Automatic Cardioverter/Defibrillator) Present  [employer] [place] with 1 [employer] [place] lead4/1/09- explanted and replaced with Medtronic 2 leads on 9/2/09      ====================ASSESSMENT ==============  Heart transplant 2/2018 for ACC/AHA stage D NICM   Resolved GI bleed, Melena s/p EGD  Supraventricular tachycardia  Severe hypertension  Hyperlactatemia acidosis, resolved  Acute respiratory failure, resolved  Leukopenia- resolved  Acute blood loss anemia, stable   Abdominal distention  ileus  Leukocytosis  CKD Stage III  C. diff diarrhea  C. diff colitis  Klebsiella oxytoca bacteremia  Sepsis  Azotemia 2/2 CKD and Steroids   Pancolitis  Macrocytic hemolytic anemia with (+) IGg cornel     Plan:  ====================== NEUROLOGY=====================  Nonfocal, no focal deficits, continue to monitor neurological status per ICU protocol.   Continue physical therapy as indicated - OOB to chair today with walking.     ==================== RESPIRATORY======================  Persistent RLL Collapse on CXR requiring bronchoscopy on 9/08   Comfortable on supplemental oxygen therapy via 4L NC.  Encourage incentive spirometry, continue pulse ox monitoring, follow ABGs. Encourage chest PT.      ====================CARDIOVASCULAR==================  S/P Heart transplant 2/2018 for ACC/AHA stage D NICM, Transplant team following.   Off everolimus. Off Cellcept while on high dose steroids.   ASA on hold due to hx of GI bleed / ulceration.  Continue invasive hemodynamic monitoring via Arterial Line  Continue Hydralazine 50mg PO q8 for a goal MAP of 70-80  Diuresis with Lasix 5mg/hr infusion to maintain net neg fluid balance    furosemide Infusion 5 mG/Hr (2.5 mL/Hr) IV Continuous <Continuous>  hydrALAZINE 50 milliGRAM(s) Oral every 8 hours    ===================HEMATOLOGIC/ONC ===================  Anemia S/P multiple blood transfusions.   Continue closely monitoring H&H and transfuse prn.   S/P EGD, found to have PUD.    Hx of Autoimmune hemolytic anemia.  Patient with (+) Igg Cornel,  f/u am hemolysis labs. f/u G6PD     Continue SQ Heparin for DVT prophylaxis,  heparin   Injectable 5000 Unit(s) SubCutaneous every 8 hours  ===================== RENAL =========================  Anasarca. NORMAN on CKD Stage III.   Diuresis with Lasix 5mg/hr Infusion as per HF recommendations   Monitor BUN/Cr, I&Os, urine output via Rosas    ==================== GASTROINTESTINAL===================  Patient with ileus in setting of C.Diff- NGT clamped 9/4 for 4 hrs, no residual. NGT removed, started on sips, dc TPN today   Monitor BMs and flatus.   CT A/P on 8/30 with Pancolitis and small volume abdominopelvic ascites, not significantly changed since 8/20/2020.  General surgery following - continue serial abdominal exams. No surgical intervention at this time.     GI prophylaxis, pantoprazole  Injectable 40 milliGRAM(s) IV Push daily  Parenteral Nutrition - Adult 1 Each (42 mL/Hr) TPN Continuous <Continuous>  =======================ENDOCRINE =====================  Stress hyperglycemia, metabolic instability, requiring glucose control with insulin gtt, titrate as per insulin drip protocol along with hourly glucose checks.     glucagon  Injectable 1 milliGRAM(s) IntraMuscular once PRN Glucose LESS THAN 70 milligrams/deciliter  insulin regular Infusion 1 Unit(s)/Hr (1 mL/Hr) IV Continuous <Continuous>  ========================INFECTIOUS DISEASE================  BCx 8/13: (+)Klebsiella bacteremia, has since been cleared.  Clostridium difficile PCR 8/23: (+), continue with vancomycin PO, can dc rectal Vanco   - S/P 5 days of IVIG (8/29-9/1)  - S/P Eravaycline (8/31- 9/6)  - S/P IV flagyl discontinued (8/24-9/1)  - S/P Rectal Vanco (8/23-9/11)  Leukocytosis, WBC at 21 this AM  Transplant prophylaxis with Posaconazole and Atovaquone on hold due to ileus, continue Cyclosporine infusion and high dose steroids  ID, Dr. Lujan following, no need for additional antibiotics at this time, monitor for now.   8/25, 9/04, 9/08 Blood cultures all Negative.     vancomycin    Solution 500 milliGRAM(s) Oral every 6 hours  methylPREDNISolone sodium succinate Injectable 8 milliGRAM(s) IV Push <User Schedule>    Patient requires continuous monitoring with bedside rhythm monitoring, arterial line, pulse oximetry, ventilator monitoring and intermittent blood gas analysis.  Care plan discussed with ICU care team.  Patient remained critical; required more than usual post op care; I have spent 35 minutes providing non-routine post op care, revaluated multiple times during the day.    By signing my name below, I, Jenni Fonseca, attest that this documentation has been prepared under the direction and in the presence of SHELLY Ennis.  Electronically signed: Katty Llamas, 09-12-20 @ 10:46    I, SHELLY Ennis, personally performed the services described in this documentation. all medical record entries made by the ramuibkurt were at my direction and in my presence. I have reviewed the chart and agree that the record reflects my personal performance and is accurate and complete  Electronically signed: SHELLY Ennis, 09-12-20 @ 10:46

## 2020-09-12 NOTE — PROGRESS NOTE ADULT - SUBJECTIVE AND OBJECTIVE BOX
Subjective:    Medications:  albumin human 25% IVPB 50 milliLiter(s) IV Intermittent every 10 minutes  benzocaine 15 mG/menthol 3.6 mG (Sugar-Free) Lozenge 1 Lozenge Oral every 6 hours PRN  chlorhexidine 2% Cloths 1 Application(s) Topical <User Schedule>  cycloSPORINE  (SandIMMUNE) IVPB 20 milliGRAM(s) IV Intermittent every 24 hours  dextrose 40% Gel 15 Gram(s) Oral once PRN  dextrose 5%. 1000 milliLiter(s) IV Continuous <Continuous>  dextrose 50% Injectable 25 Gram(s) IV Push once  dextrose 50% Injectable 12.5 Gram(s) IV Push once  dextrose 50% Injectable 25 Gram(s) IV Push once  furosemide Infusion 5 mG/Hr IV Continuous <Continuous>  glucagon  Injectable 1 milliGRAM(s) IntraMuscular once PRN  heparin   Injectable 5000 Unit(s) SubCutaneous every 8 hours  hydrALAZINE 50 milliGRAM(s) Oral every 8 hours  insulin regular Infusion 1 Unit(s)/Hr IV Continuous <Continuous>  methylPREDNISolone sodium succinate Injectable 8 milliGRAM(s) IV Push <User Schedule>  pantoprazole  Injectable 40 milliGRAM(s) IV Push daily  Parenteral Nutrition - Adult 1 Each TPN Continuous <Continuous>  vancomycin    Solution 500 milliGRAM(s) Oral every 6 hours    Vitals:  T(C): 36.7 (09-12-20 @ 15:00), Max: 36.9 (09-11-20 @ 20:00)  HR: 120 (09-12-20 @ 17:00) (114 - 126)  BP: --  BP(mean): --  ABP: 118/62 (09-12-20 @ 17:00) (107/52 - 131/61)  ABP(mean): 79 (09-12-20 @ 17:00) (69 - 84)  RR: 15 (09-12-20 @ 17:00) (11 - 20)  SpO2: 100% (09-12-20 @ 17:00) (93% - 100%)  CVP(cm H2O): --  CO: --  CI: --  PA: --  PA(mean): --  PCWP: --  SVR: --  PVR: --      I&O's Summary    11 Sep 2020 07:01  -  12 Sep 2020 07:00  --------------------------------------------------------  IN: 2221.9 mL / OUT: 2650 mL / NET: -428.1 mL    12 Sep 2020 07:01  -  12 Sep 2020 18:04  --------------------------------------------------------  IN: 1684.3 mL / OUT: 3460 mL / NET: -1775.7 mL        Physical Exam:  Appearance: No Acute Distress  HEENT: JVP ___ cm H2O, no HJR  Cardiovascular: RRR, Normal S1 S2, No murmurs/rubs/gallops  Respiratory: Clear to auscultation bilaterally  Gastrointestinal: Soft, Non-tender, non-distended	  Skin: no skin lesions  Neurologic: Non-focal  Extremities: No LE edema, warm and well perfused  Psychiatry: A & O x 3, Mood & affect appropriate        Labs:                        8.1    21.29 )-----------( 123      ( 12 Sep 2020 00:39 )             25.9     09-12    135  |  94<L>  |  88<H>  ----------------------------<  99  4.4   |  30  |  1.21    Ca    8.8      12 Sep 2020 00:39  Phos  4.2     09-12  Mg     2.3     09-12    TPro  6.4  /  Alb  2.7<L>  /  TBili  0.7  /  DBili  x   /  AST  90<H>  /  ALT  48<H>  /  AlkPhos  242<H>  09-12      Cyclosporine Level 69 (09.12.20 @ 08:42)  Cyclosporine Level: 96: 9/11/20  Everolimus, Whole Blood (09.08.20 @ 19:33)   Everolimus, Whole Blood Result: 1.3: Subjective: Sleepy, but comfortable. No complaints.    Medications:  albumin human 25% IVPB 50 milliLiter(s) IV Intermittent every 10 minutes  benzocaine 15 mG/menthol 3.6 mG (Sugar-Free) Lozenge 1 Lozenge Oral every 6 hours PRN  chlorhexidine 2% Cloths 1 Application(s) Topical <User Schedule>  cycloSPORINE  (SandIMMUNE) IVPB 20 milliGRAM(s) IV Intermittent every 24 hours  dextrose 40% Gel 15 Gram(s) Oral once PRN  dextrose 5%. 1000 milliLiter(s) IV Continuous <Continuous>  dextrose 50% Injectable 25 Gram(s) IV Push once  dextrose 50% Injectable 12.5 Gram(s) IV Push once  dextrose 50% Injectable 25 Gram(s) IV Push once  furosemide Infusion 5 mG/Hr IV Continuous <Continuous>  glucagon  Injectable 1 milliGRAM(s) IntraMuscular once PRN  heparin   Injectable 5000 Unit(s) SubCutaneous every 8 hours  hydrALAZINE 50 milliGRAM(s) Oral every 8 hours  insulin regular Infusion 1 Unit(s)/Hr IV Continuous <Continuous>  methylPREDNISolone sodium succinate Injectable 8 milliGRAM(s) IV Push <User Schedule>  pantoprazole  Injectable 40 milliGRAM(s) IV Push daily  Parenteral Nutrition - Adult 1 Each TPN Continuous <Continuous>  vancomycin    Solution 500 milliGRAM(s) Oral every 6 hours    Vitals:  T(C): 36.7 (09-12-20 @ 15:00), Max: 36.9 (09-11-20 @ 20:00)  HR: 120 (09-12-20 @ 17:00) (114 - 126)  ABP: 118/62 (09-12-20 @ 17:00) (107/52 - 131/61)  ABP(mean): 79 (09-12-20 @ 17:00) (69 - 84)  RR: 15 (09-12-20 @ 17:00) (11 - 20)  SpO2: 100% (09-12-20 @ 17:00) (93% - 100%)      I&O's Summary    11 Sep 2020 07:01  -  12 Sep 2020 07:00  --------------------------------------------------------  IN: 2221.9 mL / OUT: 2650 mL / NET: -428.1 mL    12 Sep 2020 07:01  -  12 Sep 2020 18:04  --------------------------------------------------------  IN: 1684.3 mL / OUT: 3460 mL / NET: -1775.7 mL        Physical Exam:  General: No distress. Comfortable.  HEENT: EOM intact.  Neck: Neck supple. JVP mildly elevated. No masses  Chest: Clear to auscultation bilaterally.  CV: Tachycardic, regular. Normal S1 and S2. III/VI combined systolic and diastolic murmur heard across precordium. No rubs or gallops.  Abdomen: Distended, but compressible. Non-tender. Prominent bowel sounds.   Extremities: RUE>>LUE edema, diffuse anasarca  Skin: No rashes  Neurology: Alert and oriented. Sensation intact  Psych: Affect normal        Labs:                        8.1    21.29 )-----------( 123      ( 12 Sep 2020 00:39 )             25.9     09-12    135  |  94<L>  |  88<H>  ----------------------------<  99  4.4   |  30  |  1.21    Ca    8.8      12 Sep 2020 00:39  Phos  4.2     09-12  Mg     2.3     09-12    TPro  6.4  /  Alb  2.7<L>  /  TBili  0.7  /  DBili  x   /  AST  90<H>  /  ALT  48<H>  /  AlkPhos  242<H>  09-12      Cyclosporine Level 69 (09.12.20 @ 08:42)  Cyclosporine Level: 96: 9/11/20  Everolimus, Whole Blood (09.08.20 @ 19:33)   Everolimus, Whole Blood Result: 1.3:

## 2020-09-12 NOTE — PROGRESS NOTE ADULT - PROBLEM SELECTOR PLAN 2
- We will adjust IV cyclosporine as necessary. Currently at 20 mg/day. Goal level 100 +/- 20. Dropped to 69 from 96 for unclear reasons. No change today and reassess tomorrow.  - Eventual plan to try switch back to tacrolimus as monotherapy  - Everolimus level was 5.3 on 8/31 and has been discontinued.  Repeat level sent 9/8 was 1.3.  - Continue methylprednisolone 8 mg IV (equivalent to pred 10). This was decreased on 9/5.   - Will need to resume statin and aspirin when stable.

## 2020-09-12 NOTE — PROGRESS NOTE ADULT - ASSESSMENT
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 s/p EGD with esophagitis and gastritis. Developed Klebsiella oxytoca bacteremia requiring transfer to CTU. Clinically improving, transferred to [facility], finished 10-day of ceftriaxone, however, developed severe C.diff colitis on Vancomycin 500mg q6h PO and IV Flagyl. He had RHC on 8/25 and found to have high filling pressure so transferred to CTU again.    #C.diff colitis with NORMAN- C.diff PCR detected (8/23). Repeat CT on 8/30 without evidence of toxic megacolon.   - clinically better  leucocytosis may be due to steroids  tolerating po  continue po vanco  Monitor clinically    #leukocytosis - 2/2 ongoing c diff vs other infection.  modestly decreased  - Followup on 9/4 blood culture Negative 9/8 Blood culture No Growth to date  - would start abx only for positive blood cultures as abx can worsen current cdiff infection        #OI prophylaxis-  -Was previously receiving high dose steroids  -On IV cyclosporine now  -CMV viral load(8/17, 8/26): neg  -Valcyte and Bactrim d/c'ed on 8/17 due to leukopenia  -Galactomann<0.5, Fungitell<31  -Will consider bactrim for PCP ppx when able to tolerated PO#Klebsiella oxytoca bacteremia: resolved       Kleb bacteremia  s/p Rx  Monitor clinically    ?Can line be removed-no TPN now      Will tailor plan for ID issues  per course,results.Will defer to primary team on management of other issues.  Assessment, plan and recommendations as detailed above were discussed with theCTU   team.  Infectious Diseases Service will cover over weekend.  Please call [phone] if issues

## 2020-09-12 NOTE — PROGRESS NOTE ADULT - PROBLEM SELECTOR PLAN 6
- TPN started on 9/2.   - Currently tolerated low dose TFs via NGT.  - Goal to increase TFs and wean off TPN this weekend and remove line.

## 2020-09-12 NOTE — PROGRESS NOTE ADULT - SUBJECTIVE AND OBJECTIVE BOX
ID Coverage    Patient is a 70y old  Male who presents with a chief complaint of SOB/anemia (12 Sep 2020 11:02)    Being followed by ID for C diff    Interval history:feels well  no diarrhea  denies any pain   No acute events      ROS:  No cough,SOB,CP  No N/V/D./abd pain  No other complaints      Antimicrobials:    vancomycin    Solution 500 milliGRAM(s) Oral every 6 hours    Other medications reviewed  MEDICATIONS  (STANDING):  albumin human 25% IVPB 50 milliLiter(s) IV Intermittent every 10 minutes  chlorhexidine 2% Cloths 1 Application(s) Topical <User Schedule>  cycloSPORINE  (SandIMMUNE) IVPB 20 milliGRAM(s) IV Intermittent every 24 hours  dextrose 5%. 1000 milliLiter(s) (50 mL/Hr) IV Continuous <Continuous>  dextrose 50% Injectable 25 Gram(s) IV Push once  dextrose 50% Injectable 12.5 Gram(s) IV Push once  dextrose 50% Injectable 25 Gram(s) IV Push once  furosemide Infusion 5 mG/Hr (2.5 mL/Hr) IV Continuous <Continuous>  heparin   Injectable 5000 Unit(s) SubCutaneous every 8 hours  hydrALAZINE 50 milliGRAM(s) Oral every 8 hours  insulin regular Infusion 1 Unit(s)/Hr (1 mL/Hr) IV Continuous <Continuous>  methylPREDNISolone sodium succinate Injectable 8 milliGRAM(s) IV Push <User Schedule>  pantoprazole  Injectable 40 milliGRAM(s) IV Push daily  Parenteral Nutrition - Adult 1 Each (42 mL/Hr) TPN Continuous <Continuous>  vancomycin    Solution 500 milliGRAM(s) Oral every 6 hours      Vital Signs Last 24 Hrs  T(C): 36.5 (09-12-20 @ 11:00), Max: 36.9 (09-11-20 @ 16:00)  T(F): 97.7 (09-12-20 @ 11:00), Max: 98.5 (09-11-20 @ 16:00)  HR: 117 (09-12-20 @ 11:00) (116 - 126)  BP: --  BP(mean): --  RR: 19 (09-12-20 @ 11:00) (11 - 19)  SpO2: 99% (09-12-20 @ 11:00) (99% - 100%)    Physical Exam:    RIJ TLC no erythema or tenderness  R arm PICC no erythema or tednerness  Left radial A line no erythema or tednerness  diaz    HEENT PERANNAMARIE EOMI    No oral exudate or erythema  Helaed sternotomy    Chest Good AE,CTA    CVS RRR S1 S2 WNl No murmur or rub or gallop    Abd soft BS normal Multipole healed incisions   No tenderness no masses    IV site no erythema tenderness or discharge    CNS AAO X 3 no focal    Lab Data:                          8.1    21.29 )-----------( 123      ( 12 Sep 2020 00:39 )             25.9     WBC Count: 21.29 (09-12-20 @ 00:39)  WBC Count: 24.61 (09-11-20 @ 00:33)  WBC Count: 22.33 (09-10-20 @ 06:59)  WBC Count: 22.97 (09-10-20 @ 00:41)  WBC Count: 17.81 (09-09-20 @ 00:29)  WBC Count: 19.12 (09-08-20 @ 01:50)  WBC Count: 19.12 (09-07-20 @ 00:40)  WBC Count: 17.60 (09-06-20 @ 06:34)  WBC Count: 15.32 (09-06-20 @ 05:35)    09-12    135  |  94<L>  |  88<H>  ----------------------------<  99  4.4   |  30  |  1.21    Ca    8.8      12 Sep 2020 00:39  Phos  4.2     09-12  Mg     2.3     09-12    TPro  6.4  /  Alb  2.7<L>  /  TBili  0.7  /  DBili  x   /  AST  90<H>  /  ALT  48<H>  /  AlkPhos  242<H>  09-12        Culture - Sputum (collected 08 Sep 2020 17:04)  Source: .Sputum Sputum  Gram Stain (08 Sep 2020 21:51):    No polymorphonuclear leukocytes per low power field    Rare Squamous epithelial cells per low power field    No organisms seen  Final Report (10 Sep 2020 17:06):    No growth at 48 hours    Culture - Blood (collected 08 Sep 2020 04:41)  Source: .Blood Blood-Peripheral  Preliminary Report (09 Sep 2020 05:01):    No growth to date.        d< from: Xray Chest 1 View- PORTABLE-Routine (Xray Chest 1 View- PORTABLE-Routine in AM.) (09.11.20 @ 03:19) >  IMPRESSION:    The mediastinal cardiac silhouette is unremarkable. Sternotomy. Lines and tubes and support devices are unchanged.    Low lung volumes. Elevated right hemidiaphragm. Small right effusion and right basilar atelectasis. Clear left lung.    No acute osseous finding.            < end of copied text >

## 2020-09-13 LAB
ALBUMIN SERPL ELPH-MCNC: 3.1 G/DL — LOW (ref 3.3–5)
ALP SERPL-CCNC: 233 U/L — HIGH (ref 40–120)
ALT FLD-CCNC: 50 U/L — HIGH (ref 10–45)
ANION GAP SERPL CALC-SCNC: 10 MMOL/L — SIGNIFICANT CHANGE UP (ref 5–17)
AST SERPL-CCNC: 80 U/L — HIGH (ref 10–40)
BILIRUB SERPL-MCNC: 0.8 MG/DL — SIGNIFICANT CHANGE UP (ref 0.2–1.2)
BUN SERPL-MCNC: 84 MG/DL — HIGH (ref 7–23)
CALCIUM SERPL-MCNC: 9 MG/DL — SIGNIFICANT CHANGE UP (ref 8.4–10.5)
CHLORIDE SERPL-SCNC: 94 MMOL/L — LOW (ref 96–108)
CO2 SERPL-SCNC: 32 MMOL/L — HIGH (ref 22–31)
CREAT SERPL-MCNC: 1.28 MG/DL — SIGNIFICANT CHANGE UP (ref 0.5–1.3)
CULTURE RESULTS: SIGNIFICANT CHANGE UP
CYCLOSPORINE SER-MCNC: 103 NG/ML — LOW (ref 150–400)
GAS PNL BLDA: SIGNIFICANT CHANGE UP
GLUCOSE BLDC GLUCOMTR-MCNC: 112 MG/DL — HIGH (ref 70–99)
GLUCOSE BLDC GLUCOMTR-MCNC: 114 MG/DL — HIGH (ref 70–99)
GLUCOSE BLDC GLUCOMTR-MCNC: 118 MG/DL — HIGH (ref 70–99)
GLUCOSE BLDC GLUCOMTR-MCNC: 119 MG/DL — HIGH (ref 70–99)
GLUCOSE BLDC GLUCOMTR-MCNC: 120 MG/DL — HIGH (ref 70–99)
GLUCOSE BLDC GLUCOMTR-MCNC: 122 MG/DL — HIGH (ref 70–99)
GLUCOSE BLDC GLUCOMTR-MCNC: 126 MG/DL — HIGH (ref 70–99)
GLUCOSE BLDC GLUCOMTR-MCNC: 132 MG/DL — HIGH (ref 70–99)
GLUCOSE BLDC GLUCOMTR-MCNC: 159 MG/DL — HIGH (ref 70–99)
GLUCOSE BLDC GLUCOMTR-MCNC: 161 MG/DL — HIGH (ref 70–99)
GLUCOSE BLDC GLUCOMTR-MCNC: 196 MG/DL — HIGH (ref 70–99)
GLUCOSE BLDC GLUCOMTR-MCNC: 197 MG/DL — HIGH (ref 70–99)
GLUCOSE SERPL-MCNC: 114 MG/DL — HIGH (ref 70–99)
HCT VFR BLD CALC: 26 % — LOW (ref 39–50)
HGB BLD-MCNC: 8.1 G/DL — LOW (ref 13–17)
MAGNESIUM SERPL-MCNC: 2.4 MG/DL — SIGNIFICANT CHANGE UP (ref 1.6–2.6)
MCHC RBC-ENTMCNC: 31.2 GM/DL — LOW (ref 32–36)
MCHC RBC-ENTMCNC: 31.9 PG — SIGNIFICANT CHANGE UP (ref 27–34)
MCV RBC AUTO: 102.4 FL — HIGH (ref 80–100)
NRBC # BLD: 9 /100 WBCS — HIGH (ref 0–0)
PHOSPHATE SERPL-MCNC: 4.8 MG/DL — HIGH (ref 2.5–4.5)
PLATELET # BLD AUTO: 130 K/UL — LOW (ref 150–400)
POTASSIUM SERPL-MCNC: 4.4 MMOL/L — SIGNIFICANT CHANGE UP (ref 3.5–5.3)
POTASSIUM SERPL-SCNC: 4.4 MMOL/L — SIGNIFICANT CHANGE UP (ref 3.5–5.3)
PREALB SERPL-MCNC: 25 MG/DL — SIGNIFICANT CHANGE UP (ref 20–40)
PROT SERPL-MCNC: 7 G/DL — SIGNIFICANT CHANGE UP (ref 6–8.3)
RBC # BLD: 2.54 M/UL — LOW (ref 4.2–5.8)
RBC # FLD: 26.3 % — HIGH (ref 10.3–14.5)
SODIUM SERPL-SCNC: 136 MMOL/L — SIGNIFICANT CHANGE UP (ref 135–145)
SPECIMEN SOURCE: SIGNIFICANT CHANGE UP
TSH SERPL-MCNC: 3.68 UIU/ML — SIGNIFICANT CHANGE UP (ref 0.27–4.2)
WBC # BLD: 16.57 K/UL — HIGH (ref 3.8–10.5)
WBC # FLD AUTO: 16.57 K/UL — HIGH (ref 3.8–10.5)

## 2020-09-13 PROCEDURE — 99231 SBSQ HOSP IP/OBS SF/LOW 25: CPT

## 2020-09-13 PROCEDURE — 71045 X-RAY EXAM CHEST 1 VIEW: CPT | Mod: 26

## 2020-09-13 PROCEDURE — 99291 CRITICAL CARE FIRST HOUR: CPT

## 2020-09-13 RX ORDER — HUMAN INSULIN 100 [IU]/ML
5 INJECTION, SUSPENSION SUBCUTANEOUS EVERY 12 HOURS
Refills: 0 | Status: DISCONTINUED | OUTPATIENT
Start: 2020-09-13 | End: 2020-09-28

## 2020-09-13 RX ORDER — INSULIN LISPRO 100/ML
3 VIAL (ML) SUBCUTANEOUS
Refills: 0 | Status: DISCONTINUED | OUTPATIENT
Start: 2020-09-13 | End: 2020-10-02

## 2020-09-13 RX ORDER — TACROLIMUS 5 MG/1
5 CAPSULE ORAL
Refills: 0 | Status: DISCONTINUED | OUTPATIENT
Start: 2020-09-13 | End: 2020-09-15

## 2020-09-13 RX ORDER — FUROSEMIDE 40 MG
20 TABLET ORAL EVERY 8 HOURS
Refills: 0 | Status: DISCONTINUED | OUTPATIENT
Start: 2020-09-13 | End: 2020-09-13

## 2020-09-13 RX ORDER — INSULIN LISPRO 100/ML
VIAL (ML) SUBCUTANEOUS EVERY 6 HOURS
Refills: 0 | Status: DISCONTINUED | OUTPATIENT
Start: 2020-09-13 | End: 2020-09-27

## 2020-09-13 RX ORDER — FUROSEMIDE 40 MG
40 TABLET ORAL EVERY 8 HOURS
Refills: 0 | Status: DISCONTINUED | OUTPATIENT
Start: 2020-09-13 | End: 2020-09-15

## 2020-09-13 RX ADMIN — HEPARIN SODIUM 5000 UNIT(S): 5000 INJECTION INTRAVENOUS; SUBCUTANEOUS at 21:22

## 2020-09-13 RX ADMIN — Medication 500 MILLIGRAM(S): at 08:41

## 2020-09-13 RX ADMIN — Medication 50 MILLIGRAM(S): at 21:22

## 2020-09-13 RX ADMIN — PANTOPRAZOLE SODIUM 40 MILLIGRAM(S): 20 TABLET, DELAYED RELEASE ORAL at 11:10

## 2020-09-13 RX ADMIN — HEPARIN SODIUM 5000 UNIT(S): 5000 INJECTION INTRAVENOUS; SUBCUTANEOUS at 05:00

## 2020-09-13 RX ADMIN — Medication 50 MILLIGRAM(S): at 14:23

## 2020-09-13 RX ADMIN — HEPARIN SODIUM 5000 UNIT(S): 5000 INJECTION INTRAVENOUS; SUBCUTANEOUS at 14:23

## 2020-09-13 RX ADMIN — Medication 500 MILLIGRAM(S): at 14:24

## 2020-09-13 RX ADMIN — Medication 500 MILLIGRAM(S): at 02:29

## 2020-09-13 RX ADMIN — Medication 40 MILLIGRAM(S): at 21:22

## 2020-09-13 RX ADMIN — Medication 8 MILLIGRAM(S): at 07:24

## 2020-09-13 RX ADMIN — Medication 3 UNIT(S): at 17:17

## 2020-09-13 RX ADMIN — Medication 500 MILLIGRAM(S): at 21:22

## 2020-09-13 RX ADMIN — Medication 50 MILLIGRAM(S): at 05:02

## 2020-09-13 RX ADMIN — HUMAN INSULIN 5 UNIT(S): 100 INJECTION, SUSPENSION SUBCUTANEOUS at 17:17

## 2020-09-13 RX ADMIN — TACROLIMUS 5 MILLIGRAM(S): 5 CAPSULE ORAL at 19:55

## 2020-09-13 RX ADMIN — CHLORHEXIDINE GLUCONATE 1 APPLICATION(S): 213 SOLUTION TOPICAL at 05:00

## 2020-09-13 RX ADMIN — Medication 20 MILLIGRAM(S): at 14:22

## 2020-09-13 RX ADMIN — Medication 2: at 10:12

## 2020-09-13 RX ADMIN — TACROLIMUS 5 MILLIGRAM(S): 5 CAPSULE ORAL at 11:10

## 2020-09-13 RX ADMIN — Medication 2: at 17:17

## 2020-09-13 RX ADMIN — Medication 2: at 12:40

## 2020-09-13 RX ADMIN — Medication 20 MILLIGRAM(S): at 10:12

## 2020-09-13 NOTE — PROGRESS NOTE ADULT - PROBLEM SELECTOR PLAN 5
- Appropriate response to 1u pRBCs 9/10.  - Repeat hemolysis labs look better without intervention.  - Appreciate Heme input. Given severity of C.diff infection, unable to give higher dose steroids at this time, but will reassess as he improves.  - Case reviewed with Blood Bank Director, Dr. Tomlinson.  - Follow-up G6PD to look for deficiency as contributor to the hemolytic anemia. - Appropriate response to 1u pRBCs 9/10.  - Repeat hemolysis labs look better without intervention.  - Appreciate Heme input. Given severity of C.diff infection, unable to give higher dose steroids at this time, but will reassess as he improves.  - Case reviewed with Blood Bank Director, Dr. Tomlinson.  - Normal G6PD (17.3).

## 2020-09-13 NOTE — PROGRESS NOTE ADULT - SUBJECTIVE AND OBJECTIVE BOX
[facility] NUTRITION SUPPORT / TPN -- FOLLOW UP NOTE  --------------------------------------------------------------------------------    24 hour events/subjective:  No acute overnight events.  Tolerating TF @ 40cc/hour.  TPN stopped.  Remains on insulin drip.  +flatus. +BM.    Diet:  Diet, Full Liquid:   Consistent Carbohydrate No Snacks (CSTCHO)  Tube Feeding Modality: Nasogastric  Glucerna 1.5 Sohail (GLUCERNA1.5RTH)  Total Volume for 24 Hours (mL): 1320  Continuous  Starting Tube Feed Rate mL per Hour: 40  Until Goal Tube Feed Rate (mL per Hour): 55  Tube Feed Duration (in Hours): 24  Tube Feed Start Time: 10:00 (09-12-20 @ 10:08)      PAST HISTORY  --------------------------------------------------------------------------------  No significant changes to PMH, PSH, FHx, SHx, unless otherwise noted    ALLERGIES & MEDICATIONS  --------------------------------------------------------------------------------  Allergies    No Known Allergies    Intolerances      Standing Inpatient Medications  chlorhexidine 2% Cloths 1 Application(s) Topical <User Schedule>  dextrose 5%. 1000 milliLiter(s) IV Continuous <Continuous>  dextrose 50% Injectable 12.5 Gram(s) IV Push once  dextrose 50% Injectable 25 Gram(s) IV Push once  dextrose 50% Injectable 25 Gram(s) IV Push once  furosemide   Injectable 20 milliGRAM(s) IV Push every 8 hours  heparin   Injectable 5000 Unit(s) SubCutaneous every 8 hours  hydrALAZINE 50 milliGRAM(s) Oral every 8 hours  insulin lispro (HumaLOG) corrective regimen sliding scale   SubCutaneous every 6 hours  insulin regular Infusion 1 Unit(s)/Hr IV Continuous <Continuous>  methylPREDNISolone sodium succinate Injectable 8 milliGRAM(s) IV Push <User Schedule>  pantoprazole  Injectable 40 milliGRAM(s) IV Push daily  tacrolimus 5 milliGRAM(s) Oral <User Schedule>  vancomycin    Solution 500 milliGRAM(s) Oral every 6 hours    PRN Inpatient Medications  benzocaine 15 mG/menthol 3.6 mG (Sugar-Free) Lozenge 1 Lozenge Oral every 6 hours PRN  dextrose 40% Gel 15 Gram(s) Oral once PRN  glucagon  Injectable 1 milliGRAM(s) IntraMuscular once PRN      VITALS/PHYSICAL EXAM  --------------------------------------------------------------------------------  T(C): 36.4 (09-13-20 @ 08:00), Max: 36.7 (09-12-20 @ 15:00)  HR: 116 (09-13-20 @ 10:00) (114 - 126)  BP: --  RR: 13 (09-13-20 @ 10:00) (10 - 24)  SpO2: 100% (09-13-20 @ 10:00) (93% - 100%)  Wt(kg): --        09-12-20 @ 07:01  -  09-13-20 @ 07:00  --------------------------------------------------------  IN: 2489.1 mL / OUT: 5095 mL / NET: -2605.9 mL    09-13-20 @ 07:01  -  09-13-20 @ 10:40  --------------------------------------------------------  IN: 238.3 mL / OUT: 375 mL / NET: -136.7 mL          PHYSICAL EXAM:  GEN:  guarded but stable, WD/WN/WG, (+)KO TF  HEENT: NC/AT, PERRL, mucosa moist & throat clear, no trachea midline w/ neck supple  GI: (+) BS, softly distended, nontender   MSK:  FROM x4, no deformities  VASCULAR: Equally warm, no cyanosis, clubbing,        Lt IJ TLC &  LUE PICC w/ dressing c/d/i,         BLE mild edema equal,  R>LUE edema- soft nontender w/o cord or erythema  Neurology; no focal deficits, weakened strength & sensation grossly intact  Psych: normal affect  Skin: warm,  moist, & w/ good turgor        LABS/STUDIES  --------------------------------------------------------------------------------              8.1    16.57 >-----------<  130      [09-13-20 @ 00:14]              26.0     136  |  94  |  84  ----------------------------<  114      [09-13-20 @ 00:14]  4.4   |  32  |  1.28        Ca     9.0     [09-13-20 @ 00:14]      Mg     2.4     [09-13-20 @ 00:14]      Phos  4.8     [09-13-20 @ 00:14]    TPro  7.0  /  Alb  3.1  /  TBili  0.8  /  DBili  x   /  AST  80  /  ALT  50  /  AlkPhos  233  [09-13-20 @ 00:14]          Ca ionizedBlood Gas Arterial - Calcium, Ionized: 1.12 mmoL/L (09-13-20 @ 00:01)  Blood Gas Arterial - Calcium, Ionized: 1.15 mmoL/L (09-12-20 @ 00:40)    Creatinine Trend:  POC glucoseGlucose, Serum: 114 mg/dL (09-13-20 @ 00:14)  CAPILLARY BLOOD GLUCOSE  161 (13 Sep 2020 09:00)  132 (13 Sep 2020 08:00)  126 (13 Sep 2020 07:00)  119 (13 Sep 2020 06:00)  114 (13 Sep 2020 05:00)  122 (13 Sep 2020 04:00)  120 (13 Sep 2020 03:00)  112 (13 Sep 2020 02:00)  118 (13 Sep 2020 01:00)  116 (13 Sep 2020 00:00)  121 (12 Sep 2020 23:00)  126 (12 Sep 2020 22:00)  119 (12 Sep 2020 21:00)  133 (12 Sep 2020 20:00)  157 (12 Sep 2020 19:00)  156 (12 Sep 2020 18:00)  140 (12 Sep 2020 17:00)  149 (12 Sep 2020 16:00)  137 (12 Sep 2020 15:00)  149 (12 Sep 2020 14:00)  126 (12 Sep 2020 13:00)  108 (12 Sep 2020 12:00)  111 (12 Sep 2020 11:00)      POCT Blood Glucose.: 159 mg/dL (13 Sep 2020 09:57)  POCT Blood Glucose.: 161 mg/dL (13 Sep 2020 09:03)  POCT Blood Glucose.: 132 mg/dL (13 Sep 2020 08:01)  POCT Blood Glucose.: 126 mg/dL (13 Sep 2020 06:41)  POCT Blood Glucose.: 119 mg/dL (13 Sep 2020 06:02)  POCT Blood Glucose.: 114 mg/dL (13 Sep 2020 05:07)  POCT Blood Glucose.: 122 mg/dL (13 Sep 2020 04:07)  POCT Blood Glucose.: 120 mg/dL (13 Sep 2020 03:06)  POCT Blood Glucose.: 112 mg/dL (13 Sep 2020 01:57)  POCT Blood Glucose.: 118 mg/dL (13 Sep 2020 01:02)  POCT Blood Glucose.: 121 mg/dL (12 Sep 2020 22:48)  POCT Blood Glucose.: 126 mg/dL (12 Sep 2020 22:02)  POCT Blood Glucose.: 119 mg/dL (12 Sep 2020 21:08)  POCT Blood Glucose.: 133 mg/dL (12 Sep 2020 20:09)  POCT Blood Glucose.: 157 mg/dL (12 Sep 2020 18:55)  POCT Blood Glucose.: 156 mg/dL (12 Sep 2020 18:06)  POCT Blood Glucose.: 140 mg/dL (12 Sep 2020 17:25)  POCT Blood Glucose.: 149 mg/dL (12 Sep 2020 16:14)  POCT Blood Glucose.: 137 mg/dL (12 Sep 2020 14:58)  POCT Blood Glucose.: 149 mg/dL (12 Sep 2020 14:19)  POCT Blood Glucose.: 126 mg/dL (12 Sep 2020 13:34)  POCT Blood Glucose.: 108 mg/dL (12 Sep 2020 12:10)  POCT Blood Glucose.: 111 mg/dL (12 Sep 2020 11:05)    PrealbuminPrealbumin, Serum: 25 mg/dL (09-13-20 @ 05:27)  Prealbumin, Serum: 22 mg/dL (09-08-20 @ 16:22)  Prealbumin, Serum: 10 mg/dL (09-02-20 @ 08:12)  Prealbumin, Serum: 24 mg/dL (08-25-20 @ 15:10)    Triglycerides

## 2020-09-13 NOTE — PROGRESS NOTE ADULT - PROBLEM SELECTOR PLAN 2
- We will adjust IV cyclosporine as necessary. Currently at 20 mg/day. Goal level 100 +/- 20. Dropped to 69 from 96 for unclear reasons. No change today and reassess tomorrow.  - Eventual plan to try switch back to tacrolimus as monotherapy  - Everolimus level was 5.3 on 8/31 and has been discontinued.  Repeat level sent 9/8 was 1.3.  - Continue methylprednisolone 8 mg IV (equivalent to pred 10). This was decreased on 9/5.   - Will need to resume statin and aspirin when stable. - Goal is to switch back to tacrolimus as monotherapy. Start 5 mg po Q12h now and stop cyclosporine infusion tonight at 2000.  - Get daily tacrolimus trough level starting tomorrow AM between 7-7:30. No cyclosporine trough needed.  - Everolimus level was 1.3 on 9/8, discontinued ~2 weeks ago.  - Continue methylprednisolone 8 mg IV (equivalent to pred 10). This was decreased on 9/5.   - Will need to resume statin and aspirin when stable.

## 2020-09-13 NOTE — PROGRESS NOTE ADULT - SUBJECTIVE AND OBJECTIVE BOX
Subjective:    Medications:  benzocaine 15 mG/menthol 3.6 mG (Sugar-Free) Lozenge 1 Lozenge Oral every 6 hours PRN  chlorhexidine 2% Cloths 1 Application(s) Topical <User Schedule>  cycloSPORINE  (SandIMMUNE) IVPB 20 milliGRAM(s) IV Intermittent every 24 hours  dextrose 40% Gel 15 Gram(s) Oral once PRN  dextrose 5%. 1000 milliLiter(s) IV Continuous <Continuous>  dextrose 50% Injectable 12.5 Gram(s) IV Push once  dextrose 50% Injectable 25 Gram(s) IV Push once  dextrose 50% Injectable 25 Gram(s) IV Push once  furosemide   Injectable 20 milliGRAM(s) IV Push every 8 hours  glucagon  Injectable 1 milliGRAM(s) IntraMuscular once PRN  heparin   Injectable 5000 Unit(s) SubCutaneous every 8 hours  hydrALAZINE 50 milliGRAM(s) Oral every 8 hours  insulin lispro (HumaLOG) corrective regimen sliding scale   SubCutaneous every 6 hours  insulin regular Infusion 1 Unit(s)/Hr IV Continuous <Continuous>  methylPREDNISolone sodium succinate Injectable 8 milliGRAM(s) IV Push <User Schedule>  pantoprazole  Injectable 40 milliGRAM(s) IV Push daily  vancomycin    Solution 500 milliGRAM(s) Oral every 6 hours    Vitals:  T(C): 36.4 (09-13-20 @ 08:00), Max: 36.7 (09-12-20 @ 15:00)  HR: 115 (09-13-20 @ 09:00) (114 - 126)  BP: --  BP(mean): --  ABP: 108/56 (09-13-20 @ 09:00) (101/57 - 132/71)  ABP(mean): 74 (09-13-20 @ 09:00) (69 - 92)  RR: 12 (09-13-20 @ 07:00) (10 - 24)  SpO2: 100% (09-13-20 @ 09:00) (93% - 100%)  CVP(cm H2O): --  CO: --  CI: --  PA: --  PA(mean): --  PCWP: --  SVR: --  PVR: --      I&O's Summary    12 Sep 2020 07:01  -  13 Sep 2020 07:00  --------------------------------------------------------  IN: 2489.1 mL / OUT: 5095 mL / NET: -2605.9 mL    13 Sep 2020 07:01  -  13 Sep 2020 09:53  --------------------------------------------------------  IN: 196.2 mL / OUT: 275 mL / NET: -78.8 mL        Physical Exam:  General: No distress. Comfortable.  HEENT: EOM intact.  Neck: Neck supple. JVP mildly elevated. No masses  Chest: Clear to auscultation bilaterally.  CV: Tachycardic, regular. Normal S1 and S2. III/VI combined systolic and diastolic murmur heard across precordium. No rubs or gallops.  Abdomen: Distended, but compressible. Non-tender. Prominent bowel sounds.   Extremities: RUE>>LUE edema, diffuse anasarca  Skin: No rashes  Neurology: Alert and oriented. Sensation intact  Psych: Affect normal        Labs:                        8.1    16.57 )-----------( 130      ( 13 Sep 2020 00:14 )             26.0     09-13    136  |  94<L>  |  84<H>  ----------------------------<  114<H>  4.4   |  32<H>  |  1.28    Ca    9.0      13 Sep 2020 00:14  Phos  4.8     09-13  Mg     2.4     09-13    TPro  7.0  /  Alb  3.1<L>  /  TBili  0.8  /  DBili  x   /  AST  80<H>  /  ALT  50<H>  /  AlkPhos  233<H>  09-13         Subjective:    Medications:  benzocaine 15 mG/menthol 3.6 mG (Sugar-Free) Lozenge 1 Lozenge Oral every 6 hours PRN  chlorhexidine 2% Cloths 1 Application(s) Topical <User Schedule>  cycloSPORINE  (SandIMMUNE) IVPB 20 milliGRAM(s) IV Intermittent every 24 hours  furosemide   Injectable 20 milliGRAM(s) IV Push every 8 hours  glucagon  Injectable 1 milliGRAM(s) IntraMuscular once PRN  heparin   Injectable 5000 Unit(s) SubCutaneous every 8 hours  hydrALAZINE 50 milliGRAM(s) Oral every 8 hours  insulin lispro (HumaLOG) corrective regimen sliding scale   SubCutaneous every 6 hours  insulin regular Infusion 1 Unit(s)/Hr IV Continuous <Continuous>  methylPREDNISolone sodium succinate Injectable 8 milliGRAM(s) IV Push <User Schedule>  pantoprazole  Injectable 40 milliGRAM(s) IV Push daily  vancomycin    Solution 500 milliGRAM(s) Oral every 6 hours    Vitals:  T(C): 36.4 (09-13-20 @ 08:00), Max: 36.7 (09-12-20 @ 15:00)  HR: 115 (09-13-20 @ 09:00) (114 - 126)  ABP: 108/56 (09-13-20 @ 09:00) (101/57 - 132/71)  ABP(mean): 74 (09-13-20 @ 09:00) (69 - 92)  RR: 12 (09-13-20 @ 07:00) (10 - 24)  SpO2: 100% (09-13-20 @ 09:00) (93% - 100%)      I&O's Summary    12 Sep 2020 07:01  -  13 Sep 2020 07:00  --------------------------------------------------------  IN: 2489.1 mL / OUT: 5095 mL / NET: -2605.9 mL    13 Sep 2020 07:01  -  13 Sep 2020 09:53  --------------------------------------------------------  IN: 196.2 mL / OUT: 275 mL / NET: -78.8 mL        Physical Exam:  General: No distress. Comfortable.  HEENT: EOM intact.  Neck: Neck supple. JVP mildly elevated. No masses  Chest: Clear to auscultation bilaterally.  CV: Tachycardic, regular. Normal S1 and S2. III/VI combined systolic and diastolic murmur heard across precordium. No rubs or gallops.  Abdomen: Distended, but compressible. Non-tender. Prominent bowel sounds.   Extremities: RUE>>LUE edema, diffuse anasarca  Skin: No rashes  Neurology: Alert and oriented. Sensation intact  Psych: Affect normal        Labs:                        8.1    16.57 )-----------( 130      ( 13 Sep 2020 00:14 )             26.0     09-13    136  |  94<L>  |  84<H>  ----------------------------<  114<H>  4.4   |  32<H>  |  1.28    Ca    9.0      13 Sep 2020 00:14  Phos  4.8     09-13  Mg     2.4     09-13    TPro  7.0  /  Alb  3.1<L>  /  TBili  0.8  /  DBili  x   /  AST  80<H>  /  ALT  50<H>  /  AlkPhos  233<H>  09-13

## 2020-09-13 NOTE — PROGRESS NOTE ADULT - ASSESSMENT
70y M with history of heart transplant in 2/2018 admitted with autoimmune hemolytic anemia and dark stools requiring transfusion. Found to have c diff and abdominal distension on 8/23.  Pt developed Ileus w/ NGT placement and was made NPO.  Ileus resolving, now on PO diet and NG tube feeds    - advance tube feeds to goal  - continue PO diet, as intake increases can adjust TF accordingly  - would initiate NPH 10U BID in attempt to wean from insulin drip  - care per CTU  - discussed     TPN pager [phone], spectra [phone]  M-F 8A-4P, Weekends and holidays 8A-12P  discussed with Dr. Tariq and Dr. KELLY Hansen 70y M with history of heart transplant in 2/2018 admitted with autoimmune hemolytic anemia and dark stools requiring transfusion. Found to have c diff and abdominal distension on 8/23.  Pt developed Ileus w/ NGT placement and was made NPO.  Ileus resolving, now on PO diet and NG tube feeds    - advance tube feeds to goal  - continue PO diet, as intake increases can adjust TF accordingly  - would initiate NPH 10U BID in attempt to wean from insulin drip  - care per CTU  - discussed with SHELLY HEWITT pager [phone], spectra [phone]  M-F 8A-4P, Weekends and holidays 8A-12P  discussed with Dr. Tariq and Dr. KELLY Hansen

## 2020-09-13 NOTE — PROGRESS NOTE ADULT - SUBJECTIVE AND OBJECTIVE BOX
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 Patient was treated w/ prednisone and Rituximab. Tacro was discontinued and patient was also transitioned to everolimus  Admitted  6/16-6/19  for hyperglycemia.  Reported to transplant team having one done black tarry stool-  instructed to  present to [facility] for hemolytic anemia. Patient has been following with Hematology outpatient.  Denies CP  N/V diarrhea SOB. (11 Aug 2020 20:08)    Surgery/Hospital Course:  8/11 Admitted to [facility] with symptomatic anemia  8/13 EGD for investigation of tarry stools  8/15 SB capsule: stomach & SB lesions, likely ulcers.  8/17 EGD/push enteroscopy w/ angioectasias/erosions in small bowel. Gastropathy and esophagitis. Ulcer seen on VCE most likely scope trauma.    8/18 VSS transferred back to floor.   8/20 VS 9.0/28.4 stable Gastric biopsy results LA Grade A esophagitis.  - Acute gastritis. Biopsies taken to r/o CMV, No ulceration seen despite finding on capsule endoscopy - likely 2/2 scope  trauma that has since resolved. Pathology pending.  8/21 VSS H/H  8.1/25.7  trending down will monitor prednisone taper 30 mg today. Procardia 120 initiated today RHC biopsy Monday.   8/22 VVS; Patient reports loose stools x 2-3 days with 1 episode today.  Stool sent for C-dif and GI PCR. Abdominal X-ray revealed: dilated loops of bowel. Abdomen distended.  Patient denies N/V. GI called to re-evaluate. Continue with current medication regimen.  Awaiting for upcoming cardiac biopsy on Monday 8/24.  8/23   vss    creat up to 2.28 ,  repeating CMP,  TTE ordered  Bladder scan   8/24   cardiac bx cancelled   elev creat  to 2.74   cardio renal cslt called,    + CDIF   inc vanco to 500 q6   ID following  8/25 VSS: RHC today as per transplant team; transfuse 2 units PRBC today as per Dr. Viramontes; continue vanco for cdiff; transfer patient to CTU after cath today   8/26. Dieretic challenge with good response   8/27: Downgraded to the floor then upgraded to the CTU again for concerning of abd distention   8/28 CT ABD & Pelvis reveals pancolitis  8/30: repeat CT scan of abd, gen surgery called to re-evaluated pt.  8/31: RUE duplex negative for DVT  9/4 NGT clamped, minimal residual. NGT removed. started sips   9/6 tolerating clear liquid diet  9/8 Bronch  9/9 1 prbc   9/10 increased tube feeds to 20cc/hr and tpn decreased to 63cc/hr from 125cc/hr     Today:    REVIEW OF SYSTEMS:  CONSTITUTIONAL: No weakness, fevers or chills  EYES/ENT: No visual changes  NECK: No pain or stiffness  RESPIRATORY: No cough, wheezing, hemoptysis; No shortness of breath  CARDIOVASCULAR: No chest pain or palpitations  GASTROINTESTINAL: + Abdominal distention. No epigastric pain. No nausea, vomiting, or hematemesis.  GENITOURINARY: No dysuria, frequency or hematuria  NEUROLOGICAL: No numbness or weakness  SKIN: No itching, rashes. + RUE swelling    ICU Vital Signs Last 24 Hrs  T(C): 36.4 (13 Sep 2020 08:00), Max: 36.7 (12 Sep 2020 15:00)  T(F): 97.6 (13 Sep 2020 08:00), Max: 98 (12 Sep 2020 15:00)  HR: 116 (13 Sep 2020 10:00) (114 - 126)  BP: --  BP(mean): --  ABP: 99/52 (13 Sep 2020 10:00) (99/52 - 132/71)  ABP(mean): 68 (13 Sep 2020 10:00) (68 - 92)  RR: 13 (13 Sep 2020 10:00) (10 - 24)  SpO2: 100% (13 Sep 2020 10:00) (93% - 100%)    Physical Exam:  Gen:  Awake, alert   CNS: non focal 	  Neck: no JVD  RES : Diminished breath sounds B/L , crackles at bases, no wheezing                    CVS: Sinus tachycardia. Normal S1/S2  Abd: Soft, distended. Bowel sounds present.  Skin: No rash.  Ext: +4 pitting edema in RUE, +2 edema b/l LE, +2 pitting edema in LUE.  Lines: Right Radial [place] 9/8, Left Triple IJ intro 9/03, LUE PICC 9/2     ============================I/O===========================   I&O's Detail    12 Sep 2020 07:01  -  13 Sep 2020 07:00  --------------------------------------------------------  IN:    Furosemide: 52.5 mL    Glucerna 1.5: 840 mL    Insulin: 67.5 mL    IV PiggyBack: 44.1 mL    Oral Fluid: 180 mL    Other (mL): 1053 mL    TPN (Total Parenteral Nutrition): 252 mL  Total IN: 2489.1 mL    OUT:    Indwelling Catheter - Urethral (mL): 2460 mL    Other (mL): 2635 mL  Total OUT: 5095 mL    Total NET: -2605.9 mL      13 Sep 2020 07:01  -  13 Sep 2020 10:26  --------------------------------------------------------  IN:    Furosemide: 5 mL    Glucerna 1.5: 120 mL    Insulin: 7 mL    IV PiggyBack: 6.3 mL    Oral Fluid: 100 mL  Total IN: 238.3 mL    OUT:    Indwelling Catheter - Urethral (mL): 375 mL    TPN (Total Parenteral Nutrition): 0 mL  Total OUT: 375 mL    Total NET: -136.7 mL    ============================ LABS =========================                        8.1    16.57 )-----------( 130      ( 13 Sep 2020 00:14 )             26.0     09-13    136  |  94<L>  |  84<H>  ----------------------------<  114<H>  4.4   |  32<H>  |  1.28    Ca    9.0      13 Sep 2020 00:14  Phos  4.8     09-13  Mg     2.4     09-13    TPro  7.0  /  Alb  3.1<L>  /  TBili  0.8  /  DBili  x   /  AST  80<H>  /  ALT  50<H>  /  AlkPhos  233<H>  09-13    LIVER FUNCTIONS - ( 13 Sep 2020 00:14 )  Alb: 3.1 g/dL / Pro: 7.0 g/dL / ALK PHOS: 233 U/L / ALT: 50 U/L / AST: 80 U/L / GGT: x           ABG - ( 13 Sep 2020 00:01 )  pH, Arterial: 7.44  pH, Blood: x     /  pCO2: 50    /  pO2: 102   / HCO3: 34    / Base Excess: 8.9   /  SaO2: 99        ======================Micro/Rad/Cardio=================  Culture: Reviewed   CXR: Reviewed  Echo: Reviewed  ======================================================  PAST MEDICAL & SURGICAL HISTORY:  H/O hemolytic anemia    H/O autoimmune hemolytic anemia    Knee pain, right    HLD (hyperlipidemia)    Former smoker    DVT of upper extremity (deep vein thrombosis)    Hepatitis C virus    GIB (gastrointestinal bleeding)    Ventricular fibrillation  s/p AICD    PAF (paroxysmal atrial fibrillation)  on xarelto    Non-Ischemic Cardiomyopathy    SVT (Supraventricular Tachycardia)    HTN    CHF (Congestive Heart Failure)    S/P right heart catheterization  biopsy multiple    H/O heart transplant  2/2018    Status post left hip replacement    History of Prior Ablation Treatment  for afib    AICD (Automatic Cardioverter/Defibrillator) Present  [employer] with 1 [employer] [place] lead4/1/09- explanted and replaced with Medtronic 2 leads on 9/2/09    ====================ASSESSMENT ==============  Heart transplant 2/2018 for ACC/AHA stage D NICM   Resolved GI bleed, Melena s/p EGD  Supraventricular tachycardia  Severe hypertension  Hyperlactatemia acidosis, resolved  Acute respiratory failure, resolved  Leukopenia- resolved  Acute blood loss anemia, stable   Abdominal distention  ileus  Leukocytosis  CKD Stage III  C. diff diarrhea  C. diff colitis  Klebsiella oxytoca bacteremia  Sepsis  Azotemia 2/2 CKD and Steroids   Pancolitis  Macrocytic hemolytic anemia with (+) IGg cornel     Plan:  ====================== NEUROLOGY=====================  Nonfocal, no focal deficits, continue to monitor neurological status per ICU protocol.   Continue physical therapy as indicated - OOB to chair today with walking.     ==================== RESPIRATORY======================  Persistent RLL Collapse on CXR requiring bronchoscopy on 9/08   Comfortable on supplemental oxygen therapy via 4L NC.  Encourage incentive spirometry, continue pulse ox monitoring, follow ABGs. Encourage chest PT.      ====================CARDIOVASCULAR==================  S/P Heart transplant 2/2018 for ACC/AHA stage D NICM, Transplant team following.   Off everolimus. Off Cellcept while on high dose steroids.   ASA on hold due to hx of GI bleed / ulceration.  Continue invasive hemodynamic monitoring via Arterial Line  Continue Hydralazine 50mg PO q8 for a goal MAP of 70-80  Diuresis with Lasix 5mg/hr infusion to maintain net neg fluid balance    furosemide   Injectable 20 milliGRAM(s) IV Push every 8 hours  hydrALAZINE 50 milliGRAM(s) Oral every 8 hours    ===================HEMATOLOGIC/ONC ===================  Anemia S/P multiple blood transfusions.   Continue closely monitoring H&H and transfuse prn.   S/P EGD, found to have PUD.    Hx of Autoimmune hemolytic anemia.  Patient with (+) Igg Cornel,  f/u am hemolysis labs. f/u G6PD     Continue SQ Heparin for DVT prophylaxis,  heparin   Injectable 5000 Unit(s) SubCutaneous every 8 hours    ===================== RENAL =========================  Anasarca. NORMAN on CKD Stage III.   Diuresis with Lasix 5mg/hr Infusion as per HF recommendations.  Monitor BUN/Cr, I&Os, urine output via Rosas.    ==================== GASTROINTESTINAL===================  Patient with ileus in setting of C.Diff- NGT clamped 9/4 for 4 hrs, no residual. NGT removed, started on sips, dc TPN today   Monitor BMs and flatus.   CT A/P on 8/30 with Pancolitis and small volume abdominopelvic ascites, not significantly changed since 8/20/2020.  General surgery following - continue serial abdominal exams. No surgical intervention at this time.     Continue Protonix for stress ulcer prophylaxis,  pantoprazole  Injectable 40 milliGRAM(s) IV Push daily    =======================    ENDOCRINE  =====================  Stress hyperglycemia, metabolic instability, requiring glucose control with insulin gtt, titrate as per insulin drip protocol along with hourly glucose checks.     glucagon  Injectable 1 milliGRAM(s) IntraMuscular once PRN Glucose LESS THAN 70 milligrams/deciliter  insulin lispro (HumaLOG) corrective regimen sliding scale   SubCutaneous every 6 hours  insulin regular Infusion 1 Unit(s)/Hr (1 mL/Hr) IV Continuous <Continuous>    ========================INFECTIOUS DISEASE================  BCx 8/13: (+)Klebsiella bacteremia, has since been cleared.  Clostridium difficile PCR 8/23: (+), continue with vancomycin PO, can dc rectal Vanco   - S/P 5 days of IVIG (8/29-9/1)  - S/P Eravaycline (8/31- 9/6)  - S/P IV flagyl discontinued (8/24-9/1)  - S/P Rectal Vanco (8/23-9/11)  Leukocytosis, WBC at 21 this AM  Transplant prophylaxis with Posaconazole and Atovaquone on hold due to ileus, continue Cyclosporine infusion and high dose steroids  ID, Dr. Lujan following, no need for additional antibiotics at this time, monitor for now.   8/25, 9/04, 9/08 Blood cultures all Negative.     methylPREDNISolone sodium succinate Injectable 8 milliGRAM(s) IV Push <User Schedule>  vancomycin    Solution 500 milliGRAM(s) Oral every 6 hours      Patient requires continuous monitoring with bedside rhythm monitoring, pulse ox monitoring, and intermittent blood gas analysis. Care plan discussed with ICU care team. Patient remained critical and at risk for life threatening decompensation.     By signing my name below, IAngie, attest that this documentation has been prepared under the direction and in the presence of Mechelle Hendricks PA.  Electronically signed: Ab Garcia, 09-13-20 @ 10:26    I, Mechelle Hendricks, personally performed the services described in this documentation. All medical record entries made by the ab were at my direction and in my presence. I have reviewed the chart and agree that the record reflects my personal performance and is accurate and complete  Electronically signed: Mechelle Hendricks PA.       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 Patient was treated w/ prednisone and Rituximab. Tacro was discontinued and patient was also transitioned to everolimus  Admitted  6/16-6/19  for hyperglycemia.  Reported to transplant team having one done black tarry stool-  instructed to  present to [facility] for hemolytic anemia. Patient has been following with Hematology outpatient.  Denies CP  N/V diarrhea SOB. (11 Aug 2020 20:08)    Surgery/Hospital Course:  8/11 Admitted to [facility] with symptomatic anemia  8/13 EGD for investigation of tarry stools  8/15 SB capsule: stomach & SB lesions, likely ulcers.  8/17 EGD/push enteroscopy w/ angioectasias/erosions in small bowel. Gastropathy and esophagitis. Ulcer seen on VCE most likely scope trauma.    8/18 VSS transferred back to floor.   8/20 VS 9.0/28.4 stable Gastric biopsy results LA Grade A esophagitis.  - Acute gastritis. Biopsies taken to r/o CMV, No ulceration seen despite finding on capsule endoscopy - likely 2/2 scope  trauma that has since resolved. Pathology pending.  8/21 VSS H/H  8.1/25.7  trending down will monitor prednisone taper 30 mg today. Procardia 120 initiated today RHC biopsy Monday.   8/22 VVS; Patient reports loose stools x 2-3 days with 1 episode today.  Stool sent for C-dif and GI PCR. Abdominal X-ray revealed: dilated loops of bowel. Abdomen distended.  Patient denies N/V. GI called to re-evaluate. Continue with current medication regimen.  Awaiting for upcoming cardiac biopsy on Monday 8/24.  8/23   vss    creat up to 2.28 ,  repeating CMP,  TTE ordered  Bladder scan   8/24   cardiac bx cancelled   elev creat  to 2.74   cardio renal cslt called,    + CDIF   inc vanco to 500 q6   ID following  8/25 VSS: RHC today as per transplant team; transfuse 2 units PRBC today as per Dr. Viramontes; continue vanco for cdiff; transfer patient to CTU after cath today   8/26. Dieretic challenge with good response   8/27: Downgraded to the floor then upgraded to the CTU again for concerning of abd distention   8/28 CT ABD & Pelvis reveals pancolitis  8/30: repeat CT scan of abd, gen surgery called to re-evaluated pt.  8/31: RUE duplex negative for DVT  9/4 NGT clamped, minimal residual. NGT removed. started sips   9/6 tolerating clear liquid diet  9/8 Bronch  9/9 1 prbc   9/10 increased tube feeds to 20cc/hr and tpn decreased to 63cc/hr from 125cc/hr     Today: Weaning off all gtts.    REVIEW OF SYSTEMS:  CONSTITUTIONAL: No weakness, fevers or chills  EYES/ENT: No visual changes  NECK: No pain or stiffness  RESPIRATORY: No cough, wheezing, hemoptysis; No shortness of breath  CARDIOVASCULAR: No chest pain or palpitations  GASTROINTESTINAL: + Abdominal distention. No epigastric pain. No nausea, vomiting, or hematemesis.  GENITOURINARY: No dysuria, frequency or hematuria  NEUROLOGICAL: No numbness or weakness  SKIN: No itching, rashes. + RUE swelling    ICU Vital Signs Last 24 Hrs  T(C): 36.4 (13 Sep 2020 08:00), Max: 36.7 (12 Sep 2020 15:00)  T(F): 97.6 (13 Sep 2020 08:00), Max: 98 (12 Sep 2020 15:00)  HR: 116 (13 Sep 2020 10:00) (114 - 126)  BP: --  BP(mean): --  ABP: 99/52 (13 Sep 2020 10:00) (99/52 - 132/71)  ABP(mean): 68 (13 Sep 2020 10:00) (68 - 92)  RR: 13 (13 Sep 2020 10:00) (10 - 24)  SpO2: 100% (13 Sep 2020 10:00) (93% - 100%)    Physical Exam:  Gen:  Awake, alert   CNS: non focal 	  Neck: no JVD  RES : Diminished breath sounds B/L , crackles at bases, no wheezing                    CVS: Sinus tachycardia. Normal S1/S2  Abd: Soft, distended. Bowel sounds present.  Skin: No rash.  Ext: B/L upper extremity edema R>L, +2 edema b/l LE.  Lines: Right Radial [place] 9/8, 9/3 L IJ Triple lumen      ============================I/O===========================   I&O's Detail    12 Sep 2020 07:01  -  13 Sep 2020 07:00  --------------------------------------------------------  IN:    Furosemide: 52.5 mL    Glucerna 1.5: 840 mL    Insulin: 67.5 mL    IV PiggyBack: 44.1 mL    Oral Fluid: 180 mL    Other (mL): 1053 mL    TPN (Total Parenteral Nutrition): 252 mL  Total IN: 2489.1 mL    OUT:    Indwelling Catheter - Urethral (mL): 2460 mL    Other (mL): 2635 mL  Total OUT: 5095 mL    Total NET: -2605.9 mL      13 Sep 2020 07:01  -  13 Sep 2020 10:26  --------------------------------------------------------  IN:    Furosemide: 5 mL    Glucerna 1.5: 120 mL    Insulin: 7 mL    IV PiggyBack: 6.3 mL    Oral Fluid: 100 mL  Total IN: 238.3 mL    OUT:    Indwelling Catheter - Urethral (mL): 375 mL    TPN (Total Parenteral Nutrition): 0 mL  Total OUT: 375 mL    Total NET: -136.7 mL    ============================ LABS =========================                        8.1    16.57 )-----------( 130      ( 13 Sep 2020 00:14 )             26.0     09-13    136  |  94<L>  |  84<H>  ----------------------------<  114<H>  4.4   |  32<H>  |  1.28    Ca    9.0      13 Sep 2020 00:14  Phos  4.8     09-13  Mg     2.4     09-13    TPro  7.0  /  Alb  3.1<L>  /  TBili  0.8  /  DBili  x   /  AST  80<H>  /  ALT  50<H>  /  AlkPhos  233<H>  09-13    LIVER FUNCTIONS - ( 13 Sep 2020 00:14 )  Alb: 3.1 g/dL / Pro: 7.0 g/dL / ALK PHOS: 233 U/L / ALT: 50 U/L / AST: 80 U/L / GGT: x           ABG - ( 13 Sep 2020 00:01 )  pH, Arterial: 7.44  pH, Blood: x     /  pCO2: 50    /  pO2: 102   / HCO3: 34    / Base Excess: 8.9   /  SaO2: 99        ======================Micro/Rad/Cardio=================  Culture: Reviewed   CXR: Reviewed  Echo: Reviewed  ======================================================  PAST MEDICAL & SURGICAL HISTORY:  hemolytic anemia  autoimmune hemolytic anemia  Knee pain, right  HLD (hyperlipidemia)  Former smoker  DVT of upper extremity (deep vein thrombosis)  Hepatitis C virus  GIB (gastrointestinal bleeding)  Ventricular fibrillation  s/p AICD  PAF (paroxysmal atrial fibrillation) on xarelto  Non-Ischemic Cardiomyopathy  SVT (Supraventricular Tachycardia)  HTN  CHF (Congestive Heart Failure)  2018 heart transplant  2/2018 Status post left hip replacement  History of Prior Ablation Treatment for afib  AICD (Automatic Cardioverter/Defibrillator) Present  [employer] with 1 [employer] [place] lead4/1/09- explanted and replaced with Medtronic 2 leads on 9/2/09    ====================ASSESSMENT ==============  Heart transplant 2/2018 for ACC/AHA stage D NICM   Resolved GI bleed, Melena s/p EGD  Supraventricular tachycardia  Severe hypertension  Hyperlactatemia acidosis, resolved  Acute respiratory failure, resolved  Leukopenia- resolved  Acute blood loss anemia, stable   Abdominal distention  ileus  Leukocytosis  CKD Stage III  C. diff diarrhea  C. diff colitis  Klebsiella oxytoca bacteremia  Sepsis  Azotemia 2/2 CKD and Steroids   Pancolitis  Macrocytic hemolytic anemia with (+) IGg cornel     Plan:  ====================== NEUROLOGY=====================  Nonfocal, no focal deficits, continue to monitor neurological status per ICU protocol.   Continue physical therapy as indicated - OOB to chair today with walking.     ==================== RESPIRATORY======================  Persistent RLL Collapse on CXR requiring bronchoscopy on 9/08 with elevated hemidiaphragm.  Comfortable on supplemental oxygen therapy via 4L NC.  Encourage incentive spirometry, continue pulse ox monitoring, follow ABGs. Encourage chest PT.      ====================CARDIOVASCULAR==================  S/P Heart transplant 2/2018 for ACC/AHA stage D NICM, Transplant team following.   Tacrolimus restarted this AM. Off Cellcept while on high dose steroids.   ASA on hold due to hx of GI bleed / ulceration.  Continue invasive hemodynamic monitoring via Arterial Line  Continue Hydralazine 50mg PO q8 for a goal MAP of 70-80  Lasix gtt changed to intermittent TID dosing to maintain net neg fluid balance    ===================HEMATOLOGIC/ONC ===================  Anemia S/P multiple blood transfusions.   Continue closely monitoring H&H and transfuse prn.   S/P EGD, found to have PUD.    Hx of Autoimmune hemolytic anemia.  Patient with (+) Igg Cornel,  f/u am hemolysis labs. f/u G6PD     Continue SQ Heparin for DVT prophylaxis,  heparin   Injectable 5000 Unit(s) SubCutaneous every 8 hours    ===================== RENAL =========================  Anasarca. NORMAN on CKD Stage III.   Diuresis with Lasix TID  Monitor BUN/Cr, I&Os, urine output via Rosas.    ==================== GASTROINTESTINAL===================  Patient with ileus in setting of C.Diff- NGT clamped 9/4 for 4 hrs, no residual. NGT removed, started on sips, dc TPN Yesterday.  Monitor BMs and flatus.   CT A/P on 8/30 with Pancolitis and small volume abdominopelvic ascites, not significantly changed since 8/20/2020.  General surgery following - continue serial abdominal exams. No surgical intervention at this time.     Continue Protonix for stress ulcer prophylaxis,  pantoprazole  Injectable 40 milliGRAM(s) IV Push daily    =======================    ENDOCRINE  =====================  Stress hyperglycemia, metabolic instability, requiring glucose control with insulin gtt, titrate as per insulin drip protocol along with hourly glucose checks.   Insulin gtt transitioned to sliding scale.  Monitor accuchecks.    insulin lispro (HumaLOG) corrective regimen sliding scale   SubCutaneous every 6 hours    ========================INFECTIOUS DISEASE================  BCx 8/13: (+)Klebsiella bacteremia, has since been cleared.  Clostridium difficile PCR 8/23: (+), continue with vancomycin PO, can dc rectal Vanco   - S/P 5 days of IVIG (8/29-9/1)  - S/P Eravaycline (8/31- 9/6)  - S/P IV flagyl discontinued (8/24-9/1)  - S/P Rectal Vanco (8/23-9/11)  Leukocytosis, WBC at 21 this AM  Transplant prophylaxis with Posaconazole and Atovaquone on hold due to ileus, continue Cyclosporine infusion and high dose steroids  ID, Dr. Lujan following, no need for additional antibiotics at this time, monitor for now.   8/25, 9/04, 9/08 Blood cultures all Negative.     methylPREDNISolone sodium succinate Injectable 8 milliGRAM(s) IV Push <User Schedule>  vancomycin    Solution 500 milliGRAM(s) Oral every 6 hours      Patient requires continuous monitoring with bedside rhythm monitoring, pulse ox monitoring, and intermittent blood gas analysis. Care plan discussed with ICU care team. Patient remained critical and at risk for life threatening decompensation.     By signing my name below, IAngie, attest that this documentation has been prepared under the direction and in the presence of Mechelle Hendricks PA.  Electronically signed: Ab Garcia, 09-13-20 @ 10:26    Ruthie GUDINO Kimberly, personally performed the services described in this documentation. All medical record entries made by the ab were at my direction and in my presence. I have reviewed the chart and agree that the record reflects my personal performance and is accurate and complete  Electronically signed: Mechelle Hendricks PA.

## 2020-09-14 ENCOUNTER — APPOINTMENT (OUTPATIENT)
Dept: HEMATOLOGY ONCOLOGY | Facility: CLINIC | Age: 70
End: 2020-09-14

## 2020-09-14 LAB
ALBUMIN SERPL ELPH-MCNC: 2.8 G/DL — LOW (ref 3.3–5)
ALP SERPL-CCNC: 196 U/L — HIGH (ref 40–120)
ALT FLD-CCNC: 46 U/L — HIGH (ref 10–45)
ANION GAP SERPL CALC-SCNC: 14 MMOL/L — SIGNIFICANT CHANGE UP (ref 5–17)
AST SERPL-CCNC: 67 U/L — HIGH (ref 10–40)
BILIRUB SERPL-MCNC: 0.7 MG/DL — SIGNIFICANT CHANGE UP (ref 0.2–1.2)
BUN SERPL-MCNC: 78 MG/DL — HIGH (ref 7–23)
CALCIUM SERPL-MCNC: 8.4 MG/DL — SIGNIFICANT CHANGE UP (ref 8.4–10.5)
CHLORIDE SERPL-SCNC: 94 MMOL/L — LOW (ref 96–108)
CO2 SERPL-SCNC: 29 MMOL/L — SIGNIFICANT CHANGE UP (ref 22–31)
CREAT SERPL-MCNC: 1.27 MG/DL — SIGNIFICANT CHANGE UP (ref 0.5–1.3)
GAS PNL BLDA: SIGNIFICANT CHANGE UP
GAS PNL BLDA: SIGNIFICANT CHANGE UP
GLUCOSE BLDC GLUCOMTR-MCNC: 136 MG/DL — HIGH (ref 70–99)
GLUCOSE BLDC GLUCOMTR-MCNC: 143 MG/DL — HIGH (ref 70–99)
GLUCOSE BLDC GLUCOMTR-MCNC: 155 MG/DL — HIGH (ref 70–99)
GLUCOSE BLDC GLUCOMTR-MCNC: 173 MG/DL — HIGH (ref 70–99)
GLUCOSE SERPL-MCNC: 126 MG/DL — HIGH (ref 70–99)
HCT VFR BLD CALC: 25.3 % — LOW (ref 39–50)
HGB BLD-MCNC: 7.8 G/DL — LOW (ref 13–17)
MAGNESIUM SERPL-MCNC: 2.3 MG/DL — SIGNIFICANT CHANGE UP (ref 1.6–2.6)
MCHC RBC-ENTMCNC: 30.8 GM/DL — LOW (ref 32–36)
MCHC RBC-ENTMCNC: 32.2 PG — SIGNIFICANT CHANGE UP (ref 27–34)
MCV RBC AUTO: 104.5 FL — HIGH (ref 80–100)
NRBC # BLD: 7 /100 WBCS — HIGH (ref 0–0)
PHOSPHATE SERPL-MCNC: 4.8 MG/DL — HIGH (ref 2.5–4.5)
PLATELET # BLD AUTO: 103 K/UL — LOW (ref 150–400)
POTASSIUM SERPL-MCNC: 4.1 MMOL/L — SIGNIFICANT CHANGE UP (ref 3.5–5.3)
POTASSIUM SERPL-SCNC: 4.1 MMOL/L — SIGNIFICANT CHANGE UP (ref 3.5–5.3)
PROT SERPL-MCNC: 6.1 G/DL — SIGNIFICANT CHANGE UP (ref 6–8.3)
RBC # BLD: 2.42 M/UL — LOW (ref 4.2–5.8)
RBC # FLD: 26.5 % — HIGH (ref 10.3–14.5)
SODIUM SERPL-SCNC: 137 MMOL/L — SIGNIFICANT CHANGE UP (ref 135–145)
TACROLIMUS SERPL-MCNC: 4.6 NG/ML — SIGNIFICANT CHANGE UP
WBC # BLD: 9.69 K/UL — SIGNIFICANT CHANGE UP (ref 3.8–10.5)
WBC # FLD AUTO: 9.69 K/UL — SIGNIFICANT CHANGE UP (ref 3.8–10.5)

## 2020-09-14 PROCEDURE — 71045 X-RAY EXAM CHEST 1 VIEW: CPT | Mod: 26

## 2020-09-14 PROCEDURE — 99233 SBSQ HOSP IP/OBS HIGH 50: CPT

## 2020-09-14 PROCEDURE — 99232 SBSQ HOSP IP/OBS MODERATE 35: CPT

## 2020-09-14 PROCEDURE — 99292 CRITICAL CARE ADDL 30 MIN: CPT

## 2020-09-14 PROCEDURE — 99291 CRITICAL CARE FIRST HOUR: CPT

## 2020-09-14 RX ORDER — CHLORHEXIDINE GLUCONATE 213 G/1000ML
1 SOLUTION TOPICAL
Refills: 0 | Status: DISCONTINUED | OUTPATIENT
Start: 2020-09-14 | End: 2020-09-15

## 2020-09-14 RX ORDER — ACETAMINOPHEN 500 MG
650 TABLET ORAL ONCE
Refills: 0 | Status: COMPLETED | OUTPATIENT
Start: 2020-09-14 | End: 2020-09-14

## 2020-09-14 RX ORDER — SODIUM CHLORIDE 9 MG/ML
10 INJECTION INTRAMUSCULAR; INTRAVENOUS; SUBCUTANEOUS
Refills: 0 | Status: DISCONTINUED | OUTPATIENT
Start: 2020-09-14 | End: 2020-10-02

## 2020-09-14 RX ORDER — CALCIUM GLUCONATE 100 MG/ML
1 VIAL (ML) INTRAVENOUS ONCE
Refills: 0 | Status: COMPLETED | OUTPATIENT
Start: 2020-09-14 | End: 2020-09-14

## 2020-09-14 RX ADMIN — Medication 40 MILLIGRAM(S): at 13:44

## 2020-09-14 RX ADMIN — HEPARIN SODIUM 5000 UNIT(S): 5000 INJECTION INTRAVENOUS; SUBCUTANEOUS at 13:44

## 2020-09-14 RX ADMIN — Medication 3 UNIT(S): at 17:20

## 2020-09-14 RX ADMIN — Medication 50 MILLIGRAM(S): at 21:40

## 2020-09-14 RX ADMIN — Medication 50 MILLIGRAM(S): at 05:21

## 2020-09-14 RX ADMIN — TACROLIMUS 5 MILLIGRAM(S): 5 CAPSULE ORAL at 19:35

## 2020-09-14 RX ADMIN — Medication 500 MILLIGRAM(S): at 14:16

## 2020-09-14 RX ADMIN — CHLORHEXIDINE GLUCONATE 1 APPLICATION(S): 213 SOLUTION TOPICAL at 05:21

## 2020-09-14 RX ADMIN — Medication 50 MILLIGRAM(S): at 13:43

## 2020-09-14 RX ADMIN — Medication 650 MILLIGRAM(S): at 16:42

## 2020-09-14 RX ADMIN — Medication 3 UNIT(S): at 11:23

## 2020-09-14 RX ADMIN — Medication 40 MILLIGRAM(S): at 05:20

## 2020-09-14 RX ADMIN — Medication 100 GRAM(S): at 03:31

## 2020-09-14 RX ADMIN — HUMAN INSULIN 5 UNIT(S): 100 INJECTION, SUSPENSION SUBCUTANEOUS at 17:24

## 2020-09-14 RX ADMIN — Medication 500 MILLIGRAM(S): at 19:36

## 2020-09-14 RX ADMIN — Medication 8 MILLIGRAM(S): at 07:25

## 2020-09-14 RX ADMIN — PANTOPRAZOLE SODIUM 40 MILLIGRAM(S): 20 TABLET, DELAYED RELEASE ORAL at 11:23

## 2020-09-14 RX ADMIN — Medication 650 MILLIGRAM(S): at 17:10

## 2020-09-14 RX ADMIN — HEPARIN SODIUM 5000 UNIT(S): 5000 INJECTION INTRAVENOUS; SUBCUTANEOUS at 05:20

## 2020-09-14 RX ADMIN — HEPARIN SODIUM 5000 UNIT(S): 5000 INJECTION INTRAVENOUS; SUBCUTANEOUS at 21:25

## 2020-09-14 RX ADMIN — Medication 500 MILLIGRAM(S): at 09:51

## 2020-09-14 RX ADMIN — Medication 500 MILLIGRAM(S): at 03:13

## 2020-09-14 RX ADMIN — Medication 2: at 17:20

## 2020-09-14 RX ADMIN — Medication 3 UNIT(S): at 07:44

## 2020-09-14 RX ADMIN — Medication 2: at 11:23

## 2020-09-14 RX ADMIN — Medication 40 MILLIGRAM(S): at 21:25

## 2020-09-14 RX ADMIN — TACROLIMUS 5 MILLIGRAM(S): 5 CAPSULE ORAL at 07:25

## 2020-09-14 RX ADMIN — HUMAN INSULIN 5 UNIT(S): 100 INJECTION, SUSPENSION SUBCUTANEOUS at 05:43

## 2020-09-14 NOTE — PROGRESS NOTE ADULT - PROBLEM SELECTOR PLAN 2
- Continue tacrolimus, goal will be 8-10 as monotherapy (previously on everolimus)  - Switch methylprednisolone to prednisone 10 mg daily, will not plan to wean until assessing for hemolysis on tacrolimus   - Will need to resume statin and aspirin when stable.

## 2020-09-14 NOTE — PROGRESS NOTE ADULT - ASSESSMENT
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 He was treated with Rituximab and high dose steroids. He is currently admitted with symptomatic anemia with Hgb initially of 6.6 requiring blood transfusions. Evaluation was not consistent with hemolysis. EGD/enteroscopy eventually revealed chronic gastritis and small bowel ectasias.  His course was complicated by development of severe leukopenia and acute respiratory distress and profound sinus tachycardia on 8/13 in setting of Klebsiella bacteremia of unclear origin (preceded EGD). A CT scan of the chest showed a possible infiltrate. While his bacteremia was treated, he subsequently developed C diff colitis with severe abdominal distention. He also developed worsening acute on chronic renal failure likely from volume overload which has resolved with diuretics. He has ongoing improvement in abdominal distention and leukocytosis. He was briefly on TPN but now tolerating enteral feeds. He continues to make progress.

## 2020-09-14 NOTE — PROGRESS NOTE ADULT - SUBJECTIVE AND OBJECTIVE BOX
INFECTIOUS DISEASES FOLLOW UP-- Sujey Lujan  [phone]    This is a follow up note for this  70yMale with  Anemia s/p OHTx  severe C.diff colitis  slowly improving        ROS:  CONSTITUTIONAL:  No fever,   CARDIOVASCULAR:  No chest pain or palpitations  RESPIRATORY:  No dyspnea  GASTROINTESTINAL:  No nausea, vomiting, diarrhea, or abdominal pain but notes continued abdominal distension  GENITOURINARY:  No dysuria  NEUROLOGIC:  No headache,     Allergies    No Known Allergies    Intolerances        ANTIBIOTICS/RELEVANT:  antimicrobials  vancomycin    Solution 500 milliGRAM(s) Oral every 6 hours    immunologic:  tacrolimus 5 milliGRAM(s) Oral <User Schedule>    OTHER:  benzocaine 15 mG/menthol 3.6 mG (Sugar-Free) Lozenge 1 Lozenge Oral every 6 hours PRN  chlorhexidine 4% Liquid 1 Application(s) Topical <User Schedule>  dextrose 40% Gel 15 Gram(s) Oral once PRN  dextrose 5%. 1000 milliLiter(s) IV Continuous <Continuous>  dextrose 50% Injectable 12.5 Gram(s) IV Push once  dextrose 50% Injectable 25 Gram(s) IV Push once  dextrose 50% Injectable 25 Gram(s) IV Push once  furosemide   Injectable 40 milliGRAM(s) IV Push every 8 hours  glucagon  Injectable 1 milliGRAM(s) IntraMuscular once PRN  heparin   Injectable 5000 Unit(s) SubCutaneous every 8 hours  hydrALAZINE 50 milliGRAM(s) Oral every 8 hours  insulin lispro (HumaLOG) corrective regimen sliding scale   SubCutaneous every 6 hours  insulin lispro Injectable (HumaLOG) 3 Unit(s) SubCutaneous three times a day before meals  insulin NPH human recombinant 5 Unit(s) SubCutaneous every 12 hours  pantoprazole  Injectable 40 milliGRAM(s) IV Push daily  sodium chloride 0.9% lock flush 10 milliLiter(s) IV Push every 1 hour PRN      Objective:  Vital Signs Last 24 Hrs  T(C): 36.1 (14 Sep 2020 16:00), Max: 36.2 (14 Sep 2020 08:00)  T(F): 97 (14 Sep 2020 16:00), Max: 97.2 (14 Sep 2020 08:00)  HR: 121 (14 Sep 2020 18:00) (112 - 121)  BP: --  BP(mean): --  RR: 22 (14 Sep 2020 18:00) (12 - 43)  SpO2: 97% (14 Sep 2020 18:00) (93% - 100%)    PHYSICAL EXAM:  Constitutional:no acute distress  Eyes:WALT, EOMI  Ear/Nose/Throat: no oral lesions, NG feeding tube	  Respiratory: clear BL  Cardiovascular: S1S2  Gastrointestinal: slowly improving distension  Extremities: RUE especially had area with significant swelling  No Lymphadenopathy  IV sites not inflammed.    LABS:                        7.8    9.69  )-----------( 103      ( 14 Sep 2020 00:35 )             25.3     09-14    137  |  94<L>  |  78<H>  ----------------------------<  126<H>  4.1   |  29  |  1.27    Ca    8.4      14 Sep 2020 00:35  Phos  4.8     09-14  Mg     2.3     09-14    TPro  6.1  /  Alb  2.8<L>  /  TBili  0.7  /  DBili  x   /  AST  67<H>  /  ALT  46<H>  /  AlkPhos  196<H>  09-14          MICROBIOLOGY:            RECENT CULTURES:  09-08 @ 17:04  .Sputum Sputum  --  --  --    No growth at 48 hours  --  09-08 @ 04:41  .Blood Blood-Peripheral  --  --  --    No Growth Final  --      RADIOLOGY & ADDITIONAL STUDIES:    < from: Xray Chest 1 View- PORTABLE-Routine (Xray Chest 1 View- PORTABLE-Routine in AM.) (09.14.20 @ 03:10) >  IMPRESSION:    Mild right atelectasis. Feeding tube as noted.    < end of copied text >

## 2020-09-14 NOTE — PROGRESS NOTE ADULT - SUBJECTIVE AND OBJECTIVE BOX
Subjective:  No overnight events    Medications:  benzocaine 15 mG/menthol 3.6 mG (Sugar-Free) Lozenge 1 Lozenge Oral every 6 hours PRN  chlorhexidine 4% Liquid 1 Application(s) Topical <User Schedule>  dextrose 40% Gel 15 Gram(s) Oral once PRN  dextrose 5%. 1000 milliLiter(s) IV Continuous <Continuous>  dextrose 50% Injectable 12.5 Gram(s) IV Push once  dextrose 50% Injectable 25 Gram(s) IV Push once  dextrose 50% Injectable 25 Gram(s) IV Push once  furosemide   Injectable 40 milliGRAM(s) IV Push every 8 hours  glucagon  Injectable 1 milliGRAM(s) IntraMuscular once PRN  heparin   Injectable 5000 Unit(s) SubCutaneous every 8 hours  hydrALAZINE 50 milliGRAM(s) Oral every 8 hours  insulin lispro (HumaLOG) corrective regimen sliding scale   SubCutaneous every 6 hours  insulin lispro Injectable (HumaLOG) 3 Unit(s) SubCutaneous three times a day before meals  insulin NPH human recombinant 5 Unit(s) SubCutaneous every 12 hours  pantoprazole  Injectable 40 milliGRAM(s) IV Push daily  sodium chloride 0.9% lock flush 10 milliLiter(s) IV Push every 1 hour PRN  tacrolimus 5 milliGRAM(s) Oral <User Schedule>  vancomycin    Solution 500 milliGRAM(s) Oral every 6 hours    Vitals:  T(C): 36.1 (20 @ 16:00), Max: 36.2 (20 @ 08:00)  HR: 119 (20 @ 17:00) (112 - 121)  BP: --  BP(mean): --  RR: 17 (20 @ 17:00) (12 - 43)  SpO2: 97% (20 @ 17:00) (93% - 100%)    Daily     Daily Weight in k.9 (14 Sep 2020 00:00)        I&O's Summary    13 Sep 2020 07:01  -  14 Sep 2020 07:00  --------------------------------------------------------  IN: 1628.3 mL / OUT: 1830 mL / NET: -201.7 mL    14 Sep 2020 07:01  -  14 Sep 2020 17:54  --------------------------------------------------------  IN: 400 mL / OUT: 1115 mL / NET: -715 mL        Physical Exam:  Appearance: No Acute Distress  HEENT: JVP non elevated  Cardiovascular: Tachycardic, regular. Normal S1 and S2. III/VI combined systolic and diastolic murmur heard across precordium.  Respiratory: Clear to auscultation bilaterally  Gastrointestinal: Soft, Non-tender, non-distended	  Skin: no skin lesions  Neurologic: Non-focal  Extremities: 1+ LE edema, warm and well perfused  Psychiatry: A & O x 3, Mood & affect appropriate      Labs:                        7.8    9.69  )-----------( 103      ( 14 Sep 2020 00:35 )             25.3         137  |  94<L>  |  78<H>  ----------------------------<  126<H>  4.1   |  29  |  1.27    Ca    8.4      14 Sep 2020 00:35  Phos  4.8       Mg     2.3         TPro  6.1  /  Alb  2.8<L>  /  TBili  0.7  /  DBili  x   /  AST  67<H>  /  ALT  46<H>  /  AlkPhos  196<H>

## 2020-09-14 NOTE — PROGRESS NOTE ADULT - SUBJECTIVE AND OBJECTIVE BOX
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 Patient was treated w/ prednisone and Rituximab. Tacro was discontinued and patient was also transitioned to everolimus  Admitted  6/16-6/19  for hyperglycemia.  Reported to transplant team having one done black tarry stool-  instructed to  present to [facility] for hemolytic anemia. Patient has been following with Hematology outpatient.  Denies CP  N/V diarrhea SOB. (11 Aug 2020 20:08)      Surgery/Hospital Course:  8/11 Admitted to [facility] with symptomatic anemia  8/13 EGD for investigation of tarry stools  8/15 SB capsule: stomach & SB lesions, likely ulcers.  8/17 EGD/push enteroscopy w/ angioectasias/erosions in small bowel. Gastropathy and esophagitis. Ulcer seen on VCE most likely scope trauma.    8/18 VSS transferred back to floor.   8/20 VS 9.0/28.4 stable Gastric biopsy results LA Grade A esophagitis.  - Acute gastritis. Biopsies taken to r/o CMV, No ulceration seen despite finding on capsule endoscopy - likely 2/2 scope  trauma that has since resolved. Pathology pending.  8/21 VSS H/H  8.1/25.7  trending down will monitor prednisone taper 30 mg today. Procardia 120 initiated today RHC biopsy Monday.   8/22 VVS; Patient reports loose stools x 2-3 days with 1 episode today.  Stool sent for C-dif and GI PCR. Abdominal X-ray revealed: dilated loops of bowel. Abdomen distended.  Patient denies N/V. GI called to re-evaluate. Continue with current medication regimen.  Awaiting for upcoming cardiac biopsy on Monday 8/24.  8/23   vss    creat up to 2.28 ,  repeating CMP,  TTE ordered  Bladder scan   8/24   cardiac bx cancelled   elev creat  to 2.74   cardio renal cslt called,    + CDIF   inc vanco to 500 q6   ID following  8/25 VSS: RHC today as per transplant team; transfuse 2 units PRBC today as per Dr. Viramontes; continue vanco for cdiff; transfer patient to CTU after cath today   8/26. Dieretic challenge with good response   8/27: Downgraded to the floor then upgraded to the CTU again for concerning of abd distention   8/28 CT ABD & Pelvis reveals pancolitis  8/30: repeat CT scan of abd, gen surgery called to re-evaluated pt.  8/31: RUE duplex negative for DVT  9/4 NGT clamped, minimal residual. NGT removed. started sips   9/6 tolerating clear liquid diet  9/8 Bronch  9/9 1 prbc   9/10 increased tube feeds to 20cc/hr and tpn decreased to 63cc/hr from 125cc/hr     Today:    REVIEW OF SYSTEMS:  Gen: No fever  EYES/ENT: No visual changes;  No vertigo or throat pain   NECK: No pain   RES:  No shortness of breath or Cough  CARD: No chest pain   GI: No abdominal pain  : No dysuria  NEURO: No weakness  SKIN: No itching, rashes     ICU Vital Signs Last 24 Hrs  T(C): 36.2 (14 Sep 2020 08:00), Max: 36.6 (13 Sep 2020 12:00)  T(F): 97.2 (14 Sep 2020 08:00), Max: 97.8 (13 Sep 2020 12:00)  HR: 115 (14 Sep 2020 07:00) (111 - 127)  BP: --  BP(mean): --  ABP: 120/63 (14 Sep 2020 07:00) (87/50 - 120/63)  ABP(mean): 80 (14 Sep 2020 07:00) (65 - 81)  RR: 12 (14 Sep 2020 06:00) (12 - 18)  SpO2: 100% (14 Sep 2020 07:00) (95% - 100%)      Physical Exam:  Gen:  Awake, alert   CNS: non focal 	  Neck: no JVD  RES : Diminished breath sounds B/L , crackles at bases, no wheezing                    CVS: Sinus tachycardia. Normal S1/S2  Abd: Soft, distended. Bowel sounds present.  Skin: No rash.  Ext: B/L upper extremity edema R>L, +2 edema b/l LE.  Lines: Right Radial [place] 9/8, 9/3 L IJ Triple lumen      ============================I/O===========================   I&O's Detail    13 Sep 2020 07:01  -  14 Sep 2020 07:00  --------------------------------------------------------  IN:    Furosemide: 5 mL    Glucerna 1.5: 960 mL    Insulin: 7 mL    IV PiggyBack: 6.3 mL    IV PiggyBack: 50 mL    Oral Fluid: 600 mL  Total IN: 1628.3 mL    OUT:    Indwelling Catheter - Urethral (mL): 1830 mL    TPN (Total Parenteral Nutrition): 0 mL  Total OUT: 1830 mL    Total NET: -201.7 mL      14 Sep 2020 07:01  -  14 Sep 2020 08:14  --------------------------------------------------------  IN:    Glucerna 1.5: 40 mL  Total IN: 40 mL    OUT:    Indwelling Catheter - Urethral (mL): 150 mL  Total OUT: 150 mL    Total NET: -110 mL        ============================ LABS =========================                        7.8    9.69  )-----------( 103      ( 14 Sep 2020 00:35 )             25.3     09-14    137  |  94<L>  |  78<H>  ----------------------------<  126<H>  4.1   |  29  |  1.27    Ca    8.4      14 Sep 2020 00:35  Phos  4.8     09-14  Mg     2.3     09-14    TPro  6.1  /  Alb  2.8<L>  /  TBili  0.7  /  DBili  x   /  AST  67<H>  /  ALT  46<H>  /  AlkPhos  196<H>  09-14    LIVER FUNCTIONS - ( 14 Sep 2020 00:35 )  Alb: 2.8 g/dL / Pro: 6.1 g/dL / ALK PHOS: 196 U/L / ALT: 46 U/L / AST: 67 U/L / GGT: x             ABG - ( 14 Sep 2020 00:06 )  pH, Arterial: 7.44  pH, Blood: x     /  pCO2: 50    /  pO2: 71    / HCO3: 34    / Base Excess: 9.0   /  SaO2: 95                  ======================Micro/Rad/Cardio=================  Culture: Reviewed   CXR: Reviewed  Echo:Reviewed  ======================================================  PAST MEDICAL & SURGICAL HISTORY:  H/O hemolytic anemia  H/O autoimmune hemolytic anemia  Knee pain, right  HLD (hyperlipidemia)  Former smoker  DVT of upper extremity (deep vein thrombosis)  Hepatitis C virus  GIB (gastrointestinal bleeding)  Ventricular fibrillation  s/p AICD  PAF (paroxysmal atrial fibrillation)  on xarelto  Non-Ischemic Cardiomyopathy  SVT (Supraventricular Tachycardia)  HTN  CHF (Congestive Heart Failure)  S/P right heart catheterization  biopsy multiple  H/O heart transplant  2/2018  Status post left hip replacement  History of Prior Ablation Treatment  for afib  AICD (Automatic Cardioverter/Defibrillator) Present  [employer] with 1 [employer] [place] lead4/1/09- explanted and replaced with Medtronic 2 leads on 9/2/09  ====================ASSESSMENT ==============  69 y/o M with a history of ACC/AHA Stage D NICM s/p OHT 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 s/p EGD with esophagitis and gastritis. Hospital course complicated by GI bleed s/p EGD, supraventricular tachycardia, severe hypertension, ileus, c.diff, klebsiella oxytoca bacteremia.     Plan:  ====================== NEUROLOGY=====================  Nonfocal, no focal deficits, continue to monitor neurological status   Continue physical therapy as indicated - OOB to chair today with walking.     ==================== RESPIRATORY======================  Persistent RLL Collapse on CXR requiring bronchoscopy on 9/08 with elevated hemidiaphragm.  Comfortable on supplemental oxygen therapy via 4L NC.  Encourage incentive spirometry, continue pulse ox monitoring, follow ABGs. Encourage chest PT.      ====================CARDIOVASCULAR==================  S/P Heart transplant 2/2018 for ACC/AHA stage D NICM, Transplant team following.   Tacrolimus restarted yesterday, off Cellcept while on high dose steroids.   ASA on hold due to hx of GI bleed / ulceration.  Continue invasive hemodynamic monitoring via Arterial Line  Lasix gtt changed to intermittent TID dosing to maintain net neg fluid balance    Continue Hydralazine 50mg PO q8 for a goal MAP of 70-80  hydrALAZINE 50 milliGRAM(s) Oral every 8 hours    ===================HEMATOLOGIC/ONC ===================  Anemia S/P multiple blood transfusions.   Continue closely monitoring H&H and transfuse prn.   S/P EGD, found to have PUD.    Hx of Autoimmune hemolytic anemia.  Patient with (+) Igg Jacky,  f/u am hemolysis labs. f/u G6PD     Continue SQ Heparin for DVT prophylaxis,  heparin   Injectable 5000 Unit(s) SubCutaneous every 8 hours    ===================== RENAL =========================  Anasarca. NORMAN on CKD Stage III.   Monitor BUN/Cr, I&Os, urine output via Rosas.    Diuresis with Lasix TID  furosemide   Injectable 40 milliGRAM(s) IV Push every 8 hours    ==================== GASTROINTESTINAL===================  Patient with ileus in setting of C.Diff- NGT clamped 9/4 for 4 hrs, no residual. NGT removed, started on sips  Monitor BMs and flatus.   CT A/P on 8/30 with Pancolitis and small volume abdominopelvic ascites, not significantly changed since 8/20/2020.  General surgery following - continue serial abdominal exams. No surgical intervention at this time.     Continue Protonix for stress ulcer prophylaxis,  pantoprazole  Injectable 40 milliGRAM(s) IV Push daily    =======================    ENDOCRINE  =====================  Stress hyperglycemia, metabolic instability, requiring glucose control with humalog sliding scale, NPH recombinant, and glucagon prn   glucagon  Injectable 1 milliGRAM(s) IntraMuscular once PRN Glucose LESS THAN 70 milligrams/deciliter  insulin lispro (HumaLOG) corrective regimen sliding scale   SubCutaneous every 6 hours  insulin lispro Injectable (HumaLOG) 3 Unit(s) SubCutaneous three times a day before meals  insulin NPH human recombinant 5 Unit(s) SubCutaneous every 12 hours    ========================INFECTIOUS DISEASE================  BCx 8/13: (+)Klebsiella bacteremia, has since been cleared.  Clostridium difficile PCR 8/23: (+), continue with vancomycin PO  - S/P 5 days of IVIG (8/29-9/1)  - S/P Eravaycline (8/31- 9/6)  - S/P IV flagyl discontinued (8/24-9/1)  - S/P Rectal Vanco (8/23-9/11)  Afebrile, WBC downtrending 15->9  Transplant prophylaxis with Posaconazole and Atovaquone on hold due to ileus, continue Cyclosporine infusion and high dose steroids  ID, Dr. Lujan following, no need for additional antibiotics at this time, monitor for now.   8/25, 9/04, 9/08 Blood cultures all Negative.     methylPREDNISolone sodium succinate Injectable 8 milliGRAM(s) IV Push <User Schedule>  vancomycin    Solution 500 milliGRAM(s) Oral every 6 hours      Patient requires continuous monitoring with bedside rhythm monitoring, pulse ox monitoring, and intermittent blood gas analysis. Care plan discussed with ICU care team. Patient remained critical and at risk for life threatening decompensation.     By signing my name below, I, Ronit Joe, attest that this documentation has been prepared under the direction and in the presence of SHELLY Harrison   Electronically signed: Ab Gaffney, 09-14-20 @ 08:14    I, Mechelle Hendricks,, personally performed the services described in this documentation. all medical record entries made by the ab were at my direction and in my presence. I have reviewed the chart and agree that the record reflects my personal performance and is accurate and complete  Electronically signed: SHELLY Harrison        YVONNEBILLY NGUYEN  MRN-[id number]  Patient is a 70y old  Male who presents with a chief complaint of SOB/anemia (13 Sep 2020 10:39)    HPI:  This is a 70y Male w/ NICM s/p HM2 s/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 Patient was treated w/ prednisone and Rituximab. Tacro was discontinued and patient was also transitioned to everolimus  Admitted  6/16-6/19  for hyperglycemia.  Reported to transplant team having one done black tarry stool-  instructed to  present to [facility] for hemolytic anemia. Patient has been following with Hematology outpatient.  Denies CP  N/V diarrhea SOB. (11 Aug 2020 20:08)      Surgery/Hospital Course:  8/11 Admitted to [facility] with symptomatic anemia  8/13 EGD for investigation of tarry stools  8/15 SB capsule: stomach & SB lesions, likely ulcers.  8/17 EGD/push enteroscopy w/ angioectasias/erosions in small bowel. Gastropathy and esophagitis. Ulcer seen on VCE most likely scope trauma.    8/18 VSS transferred back to floor.   8/20 VS 9.0/28.4 stable Gastric biopsy results LA Grade A esophagitis.  - Acute gastritis. Biopsies taken to r/o CMV, No ulceration seen despite finding on capsule endoscopy - likely 2/2 scope  trauma that has since resolved. Pathology pending.  8/21 VSS H/H  8.1/25.7  trending down will monitor prednisone taper 30 mg today. Procardia 120 initiated today RHC biopsy Monday.   8/22 VVS; Patient reports loose stools x 2-3 days with 1 episode today.  Stool sent for C-dif and GI PCR. Abdominal X-ray revealed: dilated loops of bowel. Abdomen distended.  Patient denies N/V. GI called to re-evaluate. Continue with current medication regimen.  Awaiting for upcoming cardiac biopsy on Monday 8/24.  8/23   vss    creat up to 2.28 ,  repeating CMP,  TTE ordered  Bladder scan   8/24   cardiac bx cancelled   elev creat  to 2.74   cardio renal cslt called,    + CDIF   inc vanco to 500 q6   ID following  8/25 VSS: RHC today as per transplant team; transfuse 2 units PRBC today as per Dr. Viramontes; continue vanco for cdiff; transfer patient to CTU after cath today   8/26. Dieretic challenge with good response   8/27: Downgraded to the floor then upgraded to the CTU again for concerning of abd distention   8/28 CT ABD & Pelvis reveals pancolitis  8/30: repeat CT scan of abd, gen surgery called to re-evaluated pt.  8/31: RUE duplex negative for DVT  9/4 NGT clamped, minimal residual. NGT removed. started sips   9/6 tolerating clear liquid diet  9/8 Bronch  9/9 1 prbc   9/10 increased tube feeds to 20cc/hr and tpn decreased to 63cc/hr from 125cc/hr   9/12 TPN discontinued      Today:  9/14 Diet advanced, tube feeds at 75% goal, central line removed and midline inserted.    REVIEW OF SYSTEMS:  Gen: No fever  EYES/ENT: No visual changes;  No vertigo or throat pain   NECK: No pain   RES:  No shortness of breath or Cough  CARD: No chest pain   GI: No abdominal pain  : No dysuria  NEURO: No weakness  SKIN: No itching, rashes     ICU Vital Signs Last 24 Hrs  T(C): 36.2 (14 Sep 2020 08:00), Max: 36.6 (13 Sep 2020 12:00)  T(F): 97.2 (14 Sep 2020 08:00), Max: 97.8 (13 Sep 2020 12:00)  HR: 115 (14 Sep 2020 07:00) (111 - 127)  BP: --  BP(mean): --  ABP: 120/63 (14 Sep 2020 07:00) (87/50 - 120/63)  ABP(mean): 80 (14 Sep 2020 07:00) (65 - 81)  RR: 12 (14 Sep 2020 06:00) (12 - 18)  SpO2: 100% (14 Sep 2020 07:00) (95% - 100%)      Physical Exam:  Gen:  Awake, alert   CNS: non focal 	  Neck: no JVD  RES : Diminished breath sounds B/L , crackles at bases, no wheezing                    CVS: Sinus tachycardia. Normal S1/S2  Abd: Soft, distended. Bowel sounds present.  Skin: No rash.  Ext: B/L upper extremity edema R>L, +2 edema b/l LE.  Lines: Right Radial [place] 9/8, 9/3 L IJ Triple lumen      ============================I/O===========================   I&O's Detail    13 Sep 2020 07:01  -  14 Sep 2020 07:00  --------------------------------------------------------  IN:    Furosemide: 5 mL    Glucerna 1.5: 960 mL    Insulin: 7 mL    IV PiggyBack: 6.3 mL    IV PiggyBack: 50 mL    Oral Fluid: 600 mL  Total IN: 1628.3 mL    OUT:    Indwelling Catheter - Urethral (mL): 1830 mL    TPN (Total Parenteral Nutrition): 0 mL  Total OUT: 1830 mL    Total NET: -201.7 mL      14 Sep 2020 07:01  -  14 Sep 2020 08:14  --------------------------------------------------------  IN:    Glucerna 1.5: 40 mL  Total IN: 40 mL    OUT:    Indwelling Catheter - Urethral (mL): 150 mL  Total OUT: 150 mL    Total NET: -110 mL        ============================ LABS =========================                        7.8    9.69  )-----------( 103      ( 14 Sep 2020 00:35 )             25.3     09-14    137  |  94<L>  |  78<H>  ----------------------------<  126<H>  4.1   |  29  |  1.27    Ca    8.4      14 Sep 2020 00:35  Phos  4.8     09-14  Mg     2.3     09-14    TPro  6.1  /  Alb  2.8<L>  /  TBili  0.7  /  DBili  x   /  AST  67<H>  /  ALT  46<H>  /  AlkPhos  196<H>  09-14    LIVER FUNCTIONS - ( 14 Sep 2020 00:35 )  Alb: 2.8 g/dL / Pro: 6.1 g/dL / ALK PHOS: 196 U/L / ALT: 46 U/L / AST: 67 U/L / GGT: x             ABG - ( 14 Sep 2020 00:06 )  pH, Arterial: 7.44  pH, Blood: x     /  pCO2: 50    /  pO2: 71    / HCO3: 34    / Base Excess: 9.0   /  SaO2: 95                  ======================Micro/Rad/Cardio=================  Culture: Reviewed   CXR: Reviewed  Echo:Reviewed  ======================================================  PAST MEDICAL & SURGICAL HISTORY:  H/O hemolytic anemia  H/O autoimmune hemolytic anemia  Knee pain, right  HLD (hyperlipidemia)  Former smoker  DVT of upper extremity (deep vein thrombosis)  Hepatitis C virus  GIB (gastrointestinal bleeding)  Ventricular fibrillation  s/p AICD  PAF (paroxysmal atrial fibrillation)  on xarelto  Non-Ischemic Cardiomyopathy  SVT (Supraventricular Tachycardia)  HTN  CHF (Congestive Heart Failure)  S/P right heart catheterization  biopsy multiple  H/O heart transplant  2/2018  Status post left hip replacement  History of Prior Ablation Treatment  for afib  AICD (Automatic Cardioverter/Defibrillator) Present  [employer] with 1 [employer] [place] lead4/1/09- explanted and replaced with Medtronic 2 leads on 9/2/09  ====================ASSESSMENT ==============  71 y/o M with a history of ACC/AHA Stage D NICM s/p OHT 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 s/p EGD with esophagitis and gastritis. Hospital course complicated by GI bleed s/p EGD, supraventricular tachycardia, severe hypertension, ileus, c.diff, klebsiella oxytoca bacteremia.     Plan:  ====================== NEUROLOGY=====================  Nonfocal, no focal deficits, continue to monitor neurological status   Continue physical therapy as indicated - OOB to chair today with walking.     ==================== RESPIRATORY======================  Persistent RLL Collapse on CXR requiring bronchoscopy on 9/08 with elevated hemidiaphragm.  Comfortable on supplemental oxygen therapy via 4L NC.  Encourage incentive spirometry, continue pulse ox monitoring, follow ABGs. Encourage chest PT.      ====================CARDIOVASCULAR==================  S/P Heart transplant 2/2018 for ACC/AHA stage D NICM, Transplant team following.   Tacrolimus restarted yesterday, off Cellcept while on high dose steroids.   ASA on hold due to hx of GI bleed / ulceration.  Continue invasive hemodynamic monitoring via Arterial Line  Lasix gtt changed to intermittent TID dosing to maintain net neg fluid balance    Continue Hydralazine 50mg PO q8 for a goal MAP of 70-80  hydrALAZINE 50 milliGRAM(s) Oral every 8 hours    ===================HEMATOLOGIC/ONC ===================  Anemia S/P multiple blood transfusions.   Continue closely monitoring H&H and transfuse prn.   S/P EGD, found to have PUD.    Hx of Autoimmune hemolytic anemia.  Patient with (+) Igg Jacky,  f/u am hemolysis labs. f/u G6PD     Continue SQ Heparin for DVT prophylaxis,  heparin   Injectable 5000 Unit(s) SubCutaneous every 8 hours    ===================== RENAL =========================  Anasarca. NORMAN on CKD Stage III.   Monitor BUN/Cr, I&Os, urine output via Rosas.    Diuresis with Lasix TID  furosemide   Injectable 40 milliGRAM(s) IV Push every 8 hours    ==================== GASTROINTESTINAL===================  Patient with ileus in setting of C.Diff- NGT clamped 9/4 for 4 hrs, no residual. NGT removed, started on sips  Monitor BMs and flatus. Diet advanced to full diet.  CT A/P on 8/30 with Pancolitis and small volume abdominopelvic ascites, not significantly changed since 8/20/2020.  General surgery following - continue serial abdominal exams. No surgical intervention at this time.     Continue Protonix for stress ulcer prophylaxis,  pantoprazole  Injectable 40 milliGRAM(s) IV Push daily    =======================    ENDOCRINE  =====================  Stress hyperglycemia, metabolic instability, requiring glucose control with humalog sliding scale, NPH recombinant, and glucagon prn   glucagon  Injectable 1 milliGRAM(s) IntraMuscular once PRN Glucose LESS THAN 70 milligrams/deciliter  insulin lispro (HumaLOG) corrective regimen sliding scale   SubCutaneous every 6 hours  insulin lispro Injectable (HumaLOG) 3 Unit(s) SubCutaneous three times a day before meals  insulin NPH human recombinant 5 Unit(s) SubCutaneous every 12 hours    ========================INFECTIOUS DISEASE================  BCx 8/13: (+)Klebsiella bacteremia, has since been cleared.  Clostridium difficile PCR 8/23: (+), continue with vancomycin PO  - S/P 5 days of IVIG (8/29-9/1)  - S/P Eravaycline (8/31- 9/6)  - S/P IV flagyl discontinued (8/24-9/1)  - S/P Rectal Vanco (8/23-9/11)  Afebrile, WBC downtrending 15->9    Transplant prophylaxis with Posaconazole and Atovaquone on hold due to ileus, continue Tacro and switch to prednisone., Dr. Lujan following, no need for additional antibiotics at this time, monitor for now.      vancomycin    Solution 500 milliGRAM(s) Oral every 6 hours      Patient requires continuous monitoring with bedside rhythm monitoring, pulse ox monitoring, and intermittent blood gas analysis. Care plan discussed with ICU care team. Patient remained critical and at risk for life threatening decompensation.     By signing my name below, I, Ronit Joe, attest that this documentation has been prepared under the direction and in the presence of SHELLY Harrison   Electronically signed: Katty Gaffney, 09-14-20 @ 08:14    I, Mechelle Hendricks,, personally performed the services described in this documentation. all medical record entries made by the ramuibe were at my direction and in my presence. I have reviewed the chart and agree that the record reflects my personal performance and is accurate and complete  Electronically signed: SHELLY Harrison

## 2020-09-14 NOTE — PROGRESS NOTE ADULT - PROBLEM SELECTOR PLAN 6
- TPN started on 9/2 and he is no longer requiring  - Continue to uptitrate PO feeds and wean TFs as tolerates

## 2020-09-14 NOTE — CHART NOTE - NSCHARTNOTEFT_GEN_A_CORE
Nutrition Follow Up Note  Patient seen for: malnutrition follow up     Source: RN, Medical record, TPN team, Transplant team    Chart reviewed, events noted: 70 year old male with PMHx of NICM, s/p HM2 LVAD and OHT in 2018, with known coronary fistula. Recent admission for hemolytic anemia, now admitted for hyperglycemia and GIB. EGD and capsule study negative. Pt now C-Diff colitis w/o toxic megacolon. Required TPN for acute severe malnutrition in the setting of Colitis and ileus.  TPN has now been discontinues since . Pt tolerating EN and full liquid diet.     Diet: Diabetic full liquid diet + EN   Glucerna 1.5 towards goal rate of 55ml/[id number]. Goal rate will provide 1980kcals and 108gm protein. (26.3kcals/kg and 1.44gm pro/kg based on dosing Wt: 75.4kg)   EN provision; Pt has been tolerating EN @ 40ml/pxw75xiq since . feeds never further advanced towards goal rate.     Patient reports: Spoke with Pt who is out of bed to chair and reports feeling well.   Pt states he has been consuming milk and oatmeal daily at each meal. Pt states he has been thinking he may be ready fr more solid foods.     Per discussion with PA, will await diet advancement approval from GI. I would recommend Consistent CHO low fiber to help promote GI tolerance.   Pt denies any GI distress at this time, last BM was today.     Daily Weight in k.9 (), Weight in k.2 (), Weight in k.3 (09-10), Weight in k.2 (), Weight in k.2 ()  % Weight Change    Pertinent Medications: MEDICATIONS  (STANDING):  chlorhexidine 2% Cloths 1 Application(s) Topical <User Schedule>  dextrose 5%. 1000 milliLiter(s) (50 mL/Hr) IV Continuous <Continuous>  dextrose 50% Injectable 12.5 Gram(s) IV Push once  dextrose 50% Injectable 25 Gram(s) IV Push once  dextrose 50% Injectable 25 Gram(s) IV Push once  furosemide   Injectable 40 milliGRAM(s) IV Push every 8 hours  heparin   Injectable 5000 Unit(s) SubCutaneous every 8 hours  hydrALAZINE 50 milliGRAM(s) Oral every 8 hours  insulin lispro (HumaLOG) corrective regimen sliding scale   SubCutaneous every 6 hours  insulin lispro Injectable (HumaLOG) 3 Unit(s) SubCutaneous three times a day before meals  insulin NPH human recombinant 5 Unit(s) SubCutaneous every 12 hours  methylPREDNISolone sodium succinate Injectable 8 milliGRAM(s) IV Push <User Schedule>  pantoprazole  Injectable 40 milliGRAM(s) IV Push daily  tacrolimus 5 milliGRAM(s) Oral <User Schedule>  vancomycin    Solution 500 milliGRAM(s) Oral every 6 hours    MEDICATIONS  (PRN):  benzocaine 15 mG/menthol 3.6 mG (Sugar-Free) Lozenge 1 Lozenge Oral every 6 hours PRN Sore Throat  dextrose 40% Gel 15 Gram(s) Oral once PRN Blood Glucose LESS THAN 70 milliGRAM(s)/deciliter  glucagon  Injectable 1 milliGRAM(s) IntraMuscular once PRN Glucose LESS THAN 70 milligrams/deciliter    Pertinent Labs:  @ 00:35: Na 137, BUN 78<H>, Cr 1.27, <H>, K+ 4.1, Phos 4.8<H>, Mg 2.3, Alk Phos 196<H>, ALT/SGPT 46<H>, AST/SGOT 67<H>, HbA1c --    Finger Sticks:  POCT Blood Glucose.: 143 mg/dL ( @ 07:35)  POCT Blood Glucose.: 136 mg/dL ( @ 05:24)  POCT Blood Glucose.: 196 mg/dL ( @ 17:15)  POCT Blood Glucose.: 197 mg/dL ( @ 12:39)      Skin per nursing documentation: intact  Edema: +2 generalized and +3 tara foot and right hand edema     Estimated Needs: based on dosing Wt: 75.2 Kg, with consideration for TPN  Energy: (25-30kcal/kg): 1880-2256kcal   Protein:  (1.4-1.6g protein/kg): 105-135g protein    Previous Nutrition Diagnosis: altered nutrition lab values  Nutrition Diagnosis is: defer at this time     Previous Nutrition Diagnosis: inadequate oral intake  Nutrition Diagnosis is: on going at this time.     Previous Nutrition Diagnosis: acute severe protein calorie malnutrition  Nutrition Diagnosis: remains appropriate, ongoing diet advancement     New Nutrition Diagnosis: None     Recommended Interventions:   1. As medically feasible, recommend to advance diet to consistent CHO, Low Fiber, + Glucerna x2 daily   2. Continue  Glucerna 1.5 @40ml/qxq06ryt. Will provide: 1440kcals and 79gm protein. Goal rate will provide 1980kcals and 108gm protein. (19kcals/kg and 1.0gm pro/kg based on dosing Wt: 75.4kg). This represents 75% of estimated nutrient needs   3. If diet advancement cleared by team, would recommend to also start a calorie count to start further titrating down EN and promoting PO Diet.   4. Trend GI tolerance   5. Trend BG levels, renal indices, LFT's, electrolytes and triglycerides     Monitoring and Evaluation:   Continue to monitor nutritional intake, tolerance to diet prescription, weights, labs, skin integrity  RD remains available upon request and will follow up per protocol  Gabbi Flowers RD, CDN, [facility] Pager #[phone].

## 2020-09-14 NOTE — PROGRESS NOTE ADULT - ASSESSMENT
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 s/p EGD with esophagitis and gastritis. Developed Klebsiella oxytoca bacteremia requiring transfer to CTU. Clinically improving, transferred to [facility], finished 10-day of ceftriaxone, however, developed severe C.diff colitis on Vancomycin 500mg q6h PO and IV Flagyl. He had RHC on 8/25 and found to have high filling pressure so transferred to CTU again.    #C.diff colitis with NORMAN- C.diff PCR detected (8/23). Repeat CT on 8/30 without evidence of toxic megacolon.   - clinically better  leucocytosis may be due to steroids  tolerating po  continue po vanco  Monitor clinically    #leukocytosis - 2/2 ongoing c diff vs other infection.  modestly decreased  - Followup on 9/4 blood culture Negative 9/8 Blood culture No Growth to date  - would start abx only for positive blood cultures as abx can worsen current cdiff infection        #OI prophylaxis-  -Was previously receiving high dose steroids  -On IV cyclosporine now  -CMV viral load(8/17, 8/26): neg  -Valcyte and Bactrim d/c'ed on 8/17 due to leukopenia  -Galactomann<0.5, Fungitell<31  -Would repeat CMV viral load this week  -Will consider bactrim or Atovaquone for PCP ppx when able to tolerated PO    Gary Lujan MD  [phone]  After 5pm/weekends [phone]

## 2020-09-14 NOTE — PROGRESS NOTE ADULT - SUBJECTIVE AND OBJECTIVE BOX
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 Patient was treated w/ prednisone and Rituximab. Tacro was discontinued and patient was also transitioned to everolimus  Admitted  6/16-6/19  for hyperglycemia.  Reported to transplant team having one done black tarry stool-  instructed to  present to [facility] for hemolytic anemia. Patient has been following with Hematology outpatient.  Denies CP  N/V diarrhea SOB. (11 Aug 2020 20:08)      Surgery/Hospital course:  8/11 Admitted to [facility] with symptomatic anemia  8/13 EGD for investigation of tarry stools  8/15 SB capsule: stomach & SB lesions, likely ulcers.  8/17 EGD/push enteroscopy w/ angioectasias/erosions in small bowel. Gastropathy and esophagitis. Ulcer seen on VCE most likely scope trauma.    8/18 VSS transferred back to floor.   8/20 VS 9.0/28.4 stable Gastric biopsy results LA Grade A esophagitis.  - Acute gastritis. Biopsies taken to r/o CMV, No ulceration seen despite finding on capsule endoscopy - likely 2/2 scope  trauma that has since resolved. Pathology pending.  8/21 VSS H/H  8.1/25.7  trending down will monitor prednisone taper 30 mg today. Procardia 120 initiated today RHC biopsy Monday.   8/22 VVS; Patient reports loose stools x 2-3 days with 1 episode today.  Stool sent for C-dif and GI PCR. Abdominal X-ray revealed: dilated loops of bowel. Abdomen distended.  Patient denies N/V. GI called to re-evaluate. Continue with current medication regimen.  Awaiting for upcoming cardiac biopsy on Monday 8/24.  8/23   vss    creat up to 2.28 ,  repeating CMP,  TTE ordered  Bladder scan   8/24   cardiac bx cancelled   elev creat  to 2.74   cardio renal cslt called,    + CDIF   inc vanco to 500 q6   ID following  8/25 VSS: RHC today as per transplant team; transfuse 2 units PRBC today as per Dr. Viramontes; continue vanco for cdiff; transfer patient to CTU after cath today   8/26. Dieretic challenge with good response   8/27: Downgraded to the floor then upgraded to the CTU again for concerning of abd distention   8/28 CT ABD & Pelvis reveals pancolitis  8/30: repeat CT scan of abd, gen surgery called to re-evaluated pt.  8/31: RUE duplex negative for DVT  9/4 NGT clamped, minimal residual. NGT removed. started sips   9/6 tolerating clear liquid diet  9/8 Bronch  9/9 1 prbc   9/10 increased tube feeds to 20cc/hr and tpn decreased to 63cc/hr from 125cc/hr   9/12 TPN discontinued  9/14 Diet advanced, tube feeds at 75% goal, central line removed and midline inserted.    Today/Overnight:      Vital Signs Last 24 Hrs  T(C): 36.1 (14 Sep 2020 16:00), Max: 36.2 (14 Sep 2020 08:00)  T(F): 97 (14 Sep 2020 16:00), Max: 97.2 (14 Sep 2020 08:00)  HR: 118 (14 Sep 2020 19:00) (114 - 121)  BP: --  BP(mean): --  RR: 15 (14 Sep 2020 19:00) (12 - 43)  SpO2: 98% (14 Sep 2020 19:00) (93% - 100%)    ============================I/O===========================  I&O's Detail    13 Sep 2020 07:01  -  14 Sep 2020 07:00  --------------------------------------------------------  IN:    Furosemide: 5 mL    Glucerna 1.5: 960 mL    Insulin: 7 mL    IV PiggyBack: 6.3 mL    IV PiggyBack: 50 mL    Oral Fluid: 600 mL  Total IN: 1628.3 mL    OUT:    Indwelling Catheter - Urethral (mL): 1830 mL    TPN (Total Parenteral Nutrition): 0 mL  Total OUT: 1830 mL    Total NET: -201.7 mL      14 Sep 2020 07:01  -  14 Sep 2020 19:10  --------------------------------------------------------  IN:    Glucerna 1.5: 480 mL  Total IN: 480 mL    OUT:    Indwelling Catheter - Urethral (mL): 1250 mL  Total OUT: 1250 mL    Total NET: -770 mL    ============================ LABS =========================                        7.8    9.69  )-----------( 103      ( 14 Sep 2020 00:35 )             25.3     09-14    137  |  94<L>  |  78<H>  ----------------------------<  126<H>  4.1   |  29  |  1.27    Ca    8.4      14 Sep 2020 00:35  Phos  4.8     09-14  Mg     2.3     09-14    TPro  6.1  /  Alb  2.8<L>  /  TBili  0.7  /  DBili  x   /  AST  67<H>  /  ALT  46<H>  /  AlkPhos  196<H>  09-14    LIVER FUNCTIONS - ( 14 Sep 2020 00:35 )  Alb: 2.8 g/dL / Pro: 6.1 g/dL / ALK PHOS: 196 U/L / ALT: 46 U/L / AST: 67 U/L / GGT: x             ABG - ( 14 Sep 2020 00:06 )  pH, Arterial: 7.44  pH, Blood: x     /  pCO2: 50    /  pO2: 71    / HCO3: 34    / Base Excess: 9.0   /  SaO2: 95        ======================Micro/Rad/Cardio=================  Culture: Reviewed   CXR: Reviewed  Echo: Reviewed  ======================================================  PAST MEDICAL & SURGICAL HISTORY:  H/O hemolytic anemia    H/O autoimmune hemolytic anemia    Knee pain, right    HLD (hyperlipidemia)    Former smoker    DVT of upper extremity (deep vein thrombosis)    Hepatitis C virus    GIB (gastrointestinal bleeding)    Ventricular fibrillation  s/p AICD    PAF (paroxysmal atrial fibrillation)  on xarelto    Non-Ischemic Cardiomyopathy    SVT (Supraventricular Tachycardia)    HTN    CHF (Congestive Heart Failure)    S/P right heart catheterization  biopsy multiple    H/O heart transplant  2/2018    Status post left hip replacement    History of Prior Ablation Treatment  for afib    AICD (Automatic Cardioverter/Defibrillator) Present  [employer] with 1 [employer] lead4/1/09- explanted and replaced with Medtronic 2 leads on 9/2/09      ========================ASSESSMENT ================  Heart transplant 2/2018 for ACC/AHA stage D NICM   Resolved GI bleed, Melena s/p EGD  Supraventricular tachycardia  Severe hypertension  Hyperlactatemia acidosis, resolved  Acute respiratory failure, resolved  Leukopenia- resolved  Acute blood loss anemia, stable   Abdominal distention  ileus  Leukocytosis  CKD Stage III  C. diff diarrhea  C. diff colitis  Klebsiella oxytoca bacteremia  Sepsis  Azotemia 2/2 CKD and Steroids   Pancolitis  Macrocytic hemolytic anemia with (+) IGg cornel     Plan:  ====================== NEUROLOGY=====================  Nonfocal, continue to monitor neurological status   Continue physical therapy as indicated - OOB to chair today with walking.     ==================== RESPIRATORY======================  Persistent RLL Collapse on CXR requiring bronchoscopy on 9/08 with elevated hemidiaphragm.  Comfortable on room air, SpO2 > 97%  Encourage incentive spirometry, continue pulse ox monitoring, follow ABGs. Encourage chest PT.      ====================CARDIOVASCULAR==================  S/P Heart transplant 2/2018 for ACC/AHA stage D NICM, Transplant team following.   Tacrolimus restarted yesterday, off Cellcept while on high dose steroids.   ASA on hold due to hx of GI bleed / ulceration.  Continue Hydralazine 50mg PO q8h for a goal MAP of 70-80  Continue invasive hemodynamic monitoring via Arterial Line  Lasix gtt changed to intermittent TID dosing to maintain net neg fluid balance    hydrALAZINE 50 milliGRAM(s) Oral every 8 hours    ===================HEMATOLOGIC/ONC ===================  Anemia S/P multiple blood transfusions.   Continue closely monitoring H&H and transfuse prn.   S/P EGD, found to have PUD.    Hx of Autoimmune hemolytic anemia.  Patient with (+) Igg Cornel,  f/u am hemolysis labs. f/u G6PD     VTE prophylaxis, heparin   Injectable 5000 Unit(s) SubCutaneous every 8 hours    ===================== RENAL =========================  Anasarca. NORMAN on CKD Stage III.   Monitor BUN/Cr, I&Os, urine output via Rosas.  Continue Diuresis with Lasix TID    furosemide   Injectable 40 milliGRAM(s) IV Push every 8 hours    ==================== GASTROINTESTINAL===================  Patient with ileus in setting of C.Diff- NGT clamped 9/4 for 4 hrs, no residual. NGT removed, started on sips  Monitor BMs and flatus. Now tolerating tube feeds, Glucerna 1.5 Sohail @ goal rate 40 cc/hr  CT A/P on 8/30 with Pancolitis and small volume abdominopelvic ascites, not significantly changed since 8/20/2020.  General surgery following - continue serial abdominal exams. No surgical intervention at this time.     dextrose 5%. 1000 milliLiter(s) (50 mL/Hr) IV Continuous <Continuous>  GI prophylaxis, pantoprazole  Injectable 40 milliGRAM(s) IV Push daily  sodium chloride 0.9% lock flush 10 milliLiter(s) IV Push every 1 hour PRN Pre/post blood products, medications, blood draw, and to maintain line patency    =======================    ENDOCRINE  =====================  Stress hyperglycemia, metabolic instability, requiring glucose control with humalog sliding scale, NPH recombinant, and glucagon prn     dextrose 40% Gel 15 Gram(s) Oral once PRN Blood Glucose LESS THAN 70 milliGRAM(s)/deciliter  dextrose 50% Injectable 12.5 Gram(s) IV Push once  dextrose 50% Injectable 25 Gram(s) IV Push once  dextrose 50% Injectable 25 Gram(s) IV Push once  glucagon  Injectable 1 milliGRAM(s) IntraMuscular once PRN Glucose LESS THAN 70 milligrams/deciliter  insulin lispro (HumaLOG) corrective regimen sliding scale   SubCutaneous every 6 hours  insulin lispro Injectable (HumaLOG) 3 Unit(s) SubCutaneous three times a day before meals  insulin NPH human recombinant 5 Unit(s) SubCutaneous every 12 hours    ========================INFECTIOUS DISEASE================  BCx 8/13: (+)Klebsiella bacteremia, has since been cleared.  Clostridium difficile PCR 8/23: (+), continue with vancomycin PO  - S/P 5 days of IVIG (8/29-9/1)  - S/P Eravaycline (8/31- 9/6)  - S/P IV flagyl discontinued (8/24-9/1)  - S/P Rectal Vanco (8/23-9/11)  Afebrile, WBC within normal limits    Transplant prophylaxis with Posaconazole and Atovaquone on hold due to ileus, continue Tacro and switch to prednisone. Dr. Lujan following, no need for additional antibiotics at this time, monitor for now.      vancomycin    Solution 500 milliGRAM(s) Oral every 6 hours      Patient requires continuous monitoring with bedside rhythm monitoring, pulse oximetry monitoring, and continuous central venous and arterial pressure monitoring; and intermittent blood gas analysis.  Care plan discussed with ICU care team.    Patient remained critical, at risk for life threatening decompensation.   I have spent 35 minutes providing acute care with multiple re-evaluations throughout the evening.     By signing my name below, I, Tonny Sheldon, attest that this documentation has been prepared under the direction and in the presence of Deyvi Frye NP.  Electronically signed: Katty Trejo, 09-14-20 @ 19:10    I, Deyvi Frye NP, personally performed the services described in this documentation. All medical record entries made by the scribe were at my direction and in my presence. 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 Patient was treated w/ prednisone and Rituximab. Tacro was discontinued and patient was also transitioned to everolimus  Admitted  6/16-6/19  for hyperglycemia.  Reported to transplant team having one done black tarry stool-  instructed to  present to [facility] for hemolytic anemia. Patient has been following with Hematology outpatient.  Denies CP  N/V diarrhea SOB. (11 Aug 2020 20:08)      Surgery/Hospital course:  8/11 Admitted to [facility] with symptomatic anemia  8/13 EGD for investigation of tarry stools  8/15 SB capsule: stomach & SB lesions, likely ulcers.  8/17 EGD/push enteroscopy w/ angioectasias/erosions in small bowel. Gastropathy and esophagitis. Ulcer seen on VCE most likely scope trauma.    8/18 VSS transferred back to floor.   8/20 VS 9.0/28.4 stable Gastric biopsy results LA Grade A esophagitis.  - Acute gastritis. Biopsies taken to r/o CMV, No ulceration seen despite finding on capsule endoscopy - likely 2/2 scope  trauma that has since resolved. Pathology pending.  8/21 VSS H/H  8.1/25.7  trending down will monitor prednisone taper 30 mg today. Procardia 120 initiated today RHC biopsy Monday.   8/22 VVS; Patient reports loose stools x 2-3 days with 1 episode today.  Stool sent for C-dif and GI PCR. Abdominal X-ray revealed: dilated loops of bowel. Abdomen distended.  Patient denies N/V. GI called to re-evaluate. Continue with current medication regimen.  Awaiting for upcoming cardiac biopsy on Monday 8/24.  8/23   vss    creat up to 2.28 ,  repeating CMP,  TTE ordered  Bladder scan   8/24   cardiac bx cancelled   elev creat  to 2.74   cardio renal cslt called,    + CDIF   inc vanco to 500 q6   ID following  8/25 VSS: RHC today as per transplant team; transfuse 2 units PRBC today as per Dr. Viramontes; continue vanco for cdiff; transfer patient to CTU after cath today   8/26. Dieretic challenge with good response   8/27: Downgraded to the floor then upgraded to the CTU again for concerning of abd distention   8/28 CT ABD & Pelvis reveals pancolitis  8/30: repeat CT scan of abd, gen surgery called to re-evaluated pt.  8/31: RUE duplex negative for DVT  9/4 NGT clamped, minimal residual. NGT removed. started sips   9/6 tolerating clear liquid diet  9/8 Bronch  9/9 1 prbc   9/10 increased tube feeds to 20cc/hr and tpn decreased to 63cc/hr from 125cc/hr   9/12 TPN discontinued  9/14 Diet advanced, tube feeds at 75% goal, central line removed and midline inserted.  9/14 pt completed approp 75 % of meal.   pt ambulated to the door this afternoon,   Today/Overnight:      Vital Signs Last 24 Hrs  T(C): 36.1 (14 Sep 2020 16:00), Max: 36.2 (14 Sep 2020 08:00)  T(F): 97 (14 Sep 2020 16:00), Max: 97.2 (14 Sep 2020 08:00)  HR: 118 (14 Sep 2020 19:00) (114 - 121)  BP: --  BP(mean): --  RR: 15 (14 Sep 2020 19:00) (12 - 43)  SpO2: 98% (14 Sep 2020 19:00) (93% - 100%)    ============================I/O===========================  I&O's Detail    13 Sep 2020 07:01  -  14 Sep 2020 07:00  --------------------------------------------------------  IN:    Furosemide: 5 mL    Glucerna 1.5: 960 mL    Insulin: 7 mL    IV PiggyBack: 6.3 mL    IV PiggyBack: 50 mL    Oral Fluid: 600 mL  Total IN: 1628.3 mL    OUT:    Indwelling Catheter - Urethral (mL): 1830 mL    TPN (Total Parenteral Nutrition): 0 mL  Total OUT: 1830 mL    Total NET: -201.7 mL      14 Sep 2020 07:01  -  14 Sep 2020 19:10  --------------------------------------------------------  IN:    Glucerna 1.5: 480 mL  Total IN: 480 mL    OUT:    Indwelling Catheter - Urethral (mL): 1250 mL  Total OUT: 1250 mL    Total NET: -770 mL    ============================ LABS =========================                        7.8    9.69  )-----------( 103      ( 14 Sep 2020 00:35 )             25.3     09-14    137  |  94<L>  |  78<H>  ----------------------------<  126<H>  4.1   |  29  |  1.27    Ca    8.4      14 Sep 2020 00:35  Phos  4.8     09-14  Mg     2.3     09-14    TPro  6.1  /  Alb  2.8<L>  /  TBili  0.7  /  DBili  x   /  AST  67<H>  /  ALT  46<H>  /  AlkPhos  196<H>  09-14    LIVER FUNCTIONS - ( 14 Sep 2020 00:35 )  Alb: 2.8 g/dL / Pro: 6.1 g/dL / ALK PHOS: 196 U/L / ALT: 46 U/L / AST: 67 U/L / GGT: x             ABG - ( 14 Sep 2020 00:06 )  pH, Arterial: 7.44  pH, Blood: x     /  pCO2: 50    /  pO2: 71    / HCO3: 34    / Base Excess: 9.0   /  SaO2: 95        ======================Micro/Rad/Cardio=================  Culture: Reviewed   CXR: Reviewed  Echo: Reviewed  ======================================================  PAST MEDICAL & SURGICAL HISTORY:  H/O hemolytic anemia    H/O autoimmune hemolytic anemia    Knee pain, right    HLD (hyperlipidemia)    Former smoker    DVT of upper extremity (deep vein thrombosis)    Hepatitis C virus    GIB (gastrointestinal bleeding)    Ventricular fibrillation  s/p AICD    PAF (paroxysmal atrial fibrillation)  on xarelto    Non-Ischemic Cardiomyopathy    SVT (Supraventricular Tachycardia)    HTN    CHF (Congestive Heart Failure)    S/P right heart catheterization  biopsy multiple    H/O heart transplant  2/2018    Status post left hip replacement    History of Prior Ablation Treatment  for afib    AICD (Automatic Cardioverter/Defibrillator) Present  [employer] with 1 [employer] [place] lead4/1/09- explanted and replaced with Medtronic 2 leads on 9/2/09      ========================ASSESSMENT ================  Heart transplant 2/2018 for ACC/AHA stage D NICM   Resolved GI bleed, Melena s/p EGD  Supraventricular tachycardia  Severe hypertension  Hyperlactatemia acidosis, resolved  Acute respiratory failure, resolved  Leukopenia- resolved  Acute blood loss anemia, stable   Abdominal distention  ileus  Leukocytosis  CKD Stage III  C. diff diarrhea  C. diff colitis  Klebsiella oxytoca bacteremia  Sepsis  Azotemia 2/2 CKD and Steroids   Pancolitis  Macrocytic hemolytic anemia with (+) IGg cornel     Plan:  ====================== NEUROLOGY=====================  Nonfocal, continue to monitor neurological status   Continue physical therapy as indicated - OOB to chair today with walking.     ==================== RESPIRATORY======================  Persistent RLL Collapse on CXR requiring bronchoscopy on 9/08 with elevated hemidiaphragm.  Comfortable on room air, SpO2 > 97%  Encourage incentive spirometry, continue pulse ox monitoring, follow ABGs. Encourage chest PT.      ====================CARDIOVASCULAR==================  S/P Heart transplant 2/2018 for ACC/AHA stage D NICM, Transplant team following.   Tacrolimus restarted yesterday, off Cellcept while on high dose steroids.   ASA on hold due to hx of GI bleed / ulceration.  Continue Hydralazine 50mg PO q8h for a goal MAP of 70-80  Continue invasive hemodynamic monitoring via Arterial Line  Lasix gtt changed to intermittent TID dosing to maintain net neg fluid balance    hydrALAZINE 50 milliGRAM(s) Oral every 8 hours    ===================HEMATOLOGIC/ONC ===================  Anemia S/P multiple blood transfusions.   Continue closely monitoring H&H and transfuse prn.   S/P EGD, found to have PUD.    Hx of Autoimmune hemolytic anemia.  Patient with (+) Igg Cornel,  f/u am hemolysis labs. f/u G6PD     VTE prophylaxis, heparin   Injectable 5000 Unit(s) SubCutaneous every 8 hours    ===================== RENAL =========================  Anasarca. NORMAN on CKD Stage III.   Monitor BUN/Cr, I&Os, urine output via Rosas.  Continue Diuresis with Lasix TID    furosemide   Injectable 40 milliGRAM(s) IV Push every 8 hours    ==================== GASTROINTESTINAL===================  Patient with ileus in setting of C.Diff- NGT clamped 9/4 for 4 hrs, no residual. NGT removed, started on sips  Monitor BMs and flatus. Now tolerating tube feeds, Glucerna 1.5 Sohail @ goal rate 40 cc/hr  CT A/P on 8/30 with Pancolitis and small volume abdominopelvic ascites, not significantly changed since 8/20/2020.  General surgery following - continue serial abdominal exams. No surgical intervention at this time.     dextrose 5%. 1000 milliLiter(s) (50 mL/Hr) IV Continuous <Continuous>  GI prophylaxis, pantoprazole  Injectable 40 milliGRAM(s) IV Push daily  sodium chloride 0.9% lock flush 10 milliLiter(s) IV Push every 1 hour PRN Pre/post blood products, medications, blood draw, and to maintain line patency    =======================    ENDOCRINE  =====================  Stress hyperglycemia, metabolic instability, requiring glucose control with humalog sliding scale, NPH recombinant, and glucagon prn     dextrose 40% Gel 15 Gram(s) Oral once PRN Blood Glucose LESS THAN 70 milliGRAM(s)/deciliter  dextrose 50% Injectable 12.5 Gram(s) IV Push once  dextrose 50% Injectable 25 Gram(s) IV Push once  dextrose 50% Injectable 25 Gram(s) IV Push once  glucagon  Injectable 1 milliGRAM(s) IntraMuscular once PRN Glucose LESS THAN 70 milligrams/deciliter  insulin lispro (HumaLOG) corrective regimen sliding scale   SubCutaneous every 6 hours  insulin lispro Injectable (HumaLOG) 3 Unit(s) SubCutaneous three times a day before meals  insulin NPH human recombinant 5 Unit(s) SubCutaneous every 12 hours    ========================INFECTIOUS DISEASE================  BCx 8/13: (+)Klebsiella bacteremia, has since been cleared.  Clostridium difficile PCR 8/23: (+), continue with vancomycin PO  - S/P 5 days of IVIG (8/29-9/1)  - S/P Eravaycline (8/31- 9/6)  - S/P IV flagyl discontinued (8/24-9/1)  - S/P Rectal Vanco (8/23-9/11)  Afebrile, WBC within normal limits    Transplant prophylaxis with Posaconazole and Atovaquone on hold due to ileus, continue Tacro and switch to prednisone. Dr. Lujan following, no need for additional antibiotics at this time, monitor for now.      vancomycin    Solution 500 milliGRAM(s) Oral every 6 hours      Patient requires continuous monitoring with bedside rhythm monitoring, pulse oximetry monitoring, and continuous central venous and arterial pressure monitoring; and intermittent blood gas analysis.  Care plan discussed with ICU care team.    Patient remained critical, at risk for life threatening decompensation.   I have spent 35 minutes providing acute care with multiple re-evaluations throughout the evening.     By signing my name below, I, Tonny Sheldon, attest that this documentation has been prepared under the direction and in the presence of Deyvi Frye NP.  Electronically signed: Katty Trejo, 09-14-20 @ 19:10    I, Deyvi Frye NP, personally performed the services described in this documentation. All medical record entries made by the ramuibe were at my direction and in my presence. I have reviewed the chart and agree that the record reflects my personal performance and is accurate and complete  Electronically signed: Deyvi Frye NP, 09-14-20 @ 19:10

## 2020-09-14 NOTE — PROGRESS NOTE ADULT - ASSESSMENT
A/P:  70y M with history of heart transplant in 2/2018 admitted with autoimmune hemolytic anemia and dark stools requiring transfusion. Found to have c diff and abdominal distension on 8/23.  Pt developed Ileus w/ NGT placement and was made NPO.  Ileus resolving and TF & diet slowly being reintroduced.    TPN team was consulted to assist w/ management in pt who was NPO for a prolonged period of time.  Pt  now being transitioned to PO nutrition  Pt w/ Acute Severe Protein-Calorie Malnutrition  TPN stopped 9/12/2020.  Pt tolerating Fulls & TF          - HyperTg- improving w/ TPN stopped, continue to monitor   - Hyperglycemia- NPH 5U q12h & Humolog 3U qmeal              Insulin gtt off- continue monitoring as by CTU              FS w/ ISS as per CTU  - Strict Intake and Output- fluid overload improving             Lasix 40mg q8h             Sheldon resolving  - HypoMg & HypoPhos- improving off tpn             Maintaining Mg levels will assist GI motility  - Elevated Alk Phos & Lfts- improving -continue to monitor  - Weights as per protocol  - Monitor  CMP, Mg, iCa, & Phos daily  - Check Prealbumin check weekly  - PICC w/ designated port for TPN, maintenance as per protocol  - Continue monitoring as per CTU w/ Surgery, will follow with you, d/w CTU team    SHELLY Stafford-C  (B) [phone]  d/w Dr Tariq

## 2020-09-14 NOTE — PROGRESS NOTE ADULT - SUBJECTIVE AND OBJECTIVE BOX
[facility] NUTRITION SUPPORT / TPN -- FOLLOW UP NOTE  --------------------------------------------------------------------------------    24 hour events/subjective:    TPN stopped  tolerating TF & full liquids  no abdominal pain  No N/ V-   (+) flatus/ (+)BM   no f/c/s  no dyspnea/ sob/ palp/ cp        Diet:  Diet, Full Liquid:   Consistent Carbohydrate No Snacks (CSTCHO)  Tube Feeding Modality: Nasogastric  Glucerna 1.5 Sohail (GLUCERNA1.5RTH)  Total Volume for 24 Hours (mL): 1320  Continuous  Starting Tube Feed Rate mL per Hour: 40  Until Goal Tube Feed Rate (mL per Hour): 55  Tube Feed Duration (in Hours): 24  Tube Feed Start Time: 10:00 (09-12-20 @ 10:08)      Appetite: [  ]Poor [ x ]Adequate [  ]Good  Caloric intake:  [  x ]  Adequate   [   ] Inadequate    ROS: General/ GI see HPI  all other systems negative      ALLERGIES & MEDICATIONS  --------------------------------------------------------------------------------  No Known Allergies      STANDING INPATIENT MEDICATIONS    chlorhexidine 2% Cloths 1 Application(s) Topical <User Schedule>  dextrose 5%. 1000 milliLiter(s) IV Continuous <Continuous>  dextrose 50% Injectable 12.5 Gram(s) IV Push once  dextrose 50% Injectable 25 Gram(s) IV Push once  dextrose 50% Injectable 25 Gram(s) IV Push once  furosemide   Injectable 40 milliGRAM(s) IV Push every 8 hours  heparin   Injectable 5000 Unit(s) SubCutaneous every 8 hours  hydrALAZINE 50 milliGRAM(s) Oral every 8 hours  insulin lispro (HumaLOG) corrective regimen sliding scale   SubCutaneous every 6 hours  insulin lispro Injectable (HumaLOG) 3 Unit(s) SubCutaneous three times a day before meals  insulin NPH human recombinant 5 Unit(s) SubCutaneous every 12 hours  methylPREDNISolone sodium succinate Injectable 8 milliGRAM(s) IV Push <User Schedule>  pantoprazole  Injectable 40 milliGRAM(s) IV Push daily  tacrolimus 5 milliGRAM(s) Oral <User Schedule>  vancomycin    Solution 500 milliGRAM(s) Oral every 6 hours      PRN INPATIENT MEDICATION  benzocaine 15 mG/menthol 3.6 mG (Sugar-Free) Lozenge 1 Lozenge Oral every 6 hours PRN  dextrose 40% Gel 15 Gram(s) Oral once PRN  glucagon  Injectable 1 milliGRAM(s) IntraMuscular once PRN        VITALS/PHYSICAL EXAM  --------------------------------------------------------------------------------  T(C): 36.2 (09-14-20 @ 08:00), Max: 36.6 (09-13-20 @ 12:00)  HR: 119 (09-14-20 @ 10:00) (111 - 127)  BP: --  RR: 13 (09-14-20 @ 10:00) (12 - 18)  SpO2: 97% (09-14-20 @ 10:00) (95% - 100%)  Wt(kg): --        09-13-20 @ 07:01  -  09-14-20 @ 07:00  --------------------------------------------------------  IN: 1628.3 mL / OUT: 1830 mL / NET: -201.7 mL    09-14-20 @ 07:01  -  09-14-20 @ 10:01  --------------------------------------------------------  IN: 120 mL / OUT: 365 mL / NET: -245 mL      PHYSICAL EXAM:  GEN:  guarded but stable, WD/WN/WG, (+)KO TF  HEENT: NC/AT, PERRL, mucosa moist & throat clear, no trachea midline w/ neck supple  GI: (+) BS, softly distended, nontender   MSK:  FROM x4, no deformities  VASCULAR: Equally warm, no cyanosis, clubbing,        Lt IJ TLC &  LUE PICC w/ dressing c/d/i,         BLE mild edema equal,  R>LUE edema- soft nontender w/o cord or erythema  Neurology; no focal deficits, weakened strength & sensation grossly intact  Psych: normal affect  Skin: warm,  moist, & w/ good turgor      LABS/ CULTURES/ RADIOLOGY:              7.8    9.69  >-----------<  103      [09-14-20 @ 00:35]              25.3     137  |  94  |  78  ----------------------------<  126      [09-14-20 @ 00:35]  4.1   |  29  |  1.27        Ca     8.4     [09-14-20 @ 00:35]      Mg     2.3     [09-14-20 @ 00:35]      Phos  4.8     [09-14-20 @ 00:35]    TPro  6.1  /  Alb  2.8  /  TBili  0.7  /  DBili  x   /  AST  67  /  ALT  46  /  AlkPhos  196  [09-14-20 @ 00:35]          Blood Gas Arterial - Calcium, Ionized: 1.09 mmoL/L (09-14-20 @ 00:06)  Blood Gas Arterial - Calcium, Ionized: 1.12 mmoL/L (09-13-20 @ 00:01)      CAPILLARY BLOOD GLUCOSE  POCT Blood Glucose.: 143 mg/dL (14 Sep 2020 07:35)  POCT Blood Glucose.: 136 mg/dL (14 Sep 2020 05:24)  POCT Blood Glucose.: 196 mg/dL (13 Sep 2020 17:15)  POCT Blood Glucose.: 197 mg/dL (13 Sep 2020 12:39)    Prealbumin, Serum: 25 mg/dL (09-13-20 @ 05:27)  Prealbumin, Serum: 22 mg/dL (09-08-20 @ 16:22)  Prealbumin, Serum: 10 mg/dL (09-02-20 @ 08:12)  Prealbumin, Serum: 24 mg/dL (08-25-20 @ 15:10)      Triglycerides, Serum: 188 mg/dL (09-12-20 @ 00:39)  Triglycerides, Serum: 185 mg/dL (09-11-20 @ 00:31)  Triglycerides, Serum: 200 mg/dL (09-10-20 @ 00:41)  Triglycerides, Serum: 289 mg/dL (09-09-20 @ 00:29)  Triglycerides, Serum: 326 mg/dL (09-08-20 @ 11:05)    < from: Xray Chest 1 View- PORTABLE-Routine (Xray Chest 1 View- PORTABLE-Routine in AM.) (09.13.20 @ 02:41) >  INTERPRETATION:  CLINICAL INFORMATION: Status post cardiac thoracic surgery.    TECHNIQUE: One view of the chest.    COMPARISON: Chest radiograph 9/11/2020.    FINDINGS:    LUNGS: Unchanged right lower lung linear opacities. Left lung is clear.    PLEURA: Elevated right hemidiaphragm. Unchanged small right pleural effusion. No pneumothorax.    HEART AND MEDIASTINUM: Enteric tube is present, thetip is not visualized. Left IJ central venous catheter terminates in the left brachiocephalic vein unchanged. Status post median sternotomy. The upper sternal wire is fractured as on the prior study. Heart size is poorly assessed.    SKELETON: Unremarkable skeletal structures.      IMPRESSION:    Interval removal of left upper extremity PICC. Unchanged right lower lung atelectasis and small pleural effusion.    < end of copied text >   [facility] NUTRITION SUPPORT / TPN -- FOLLOW UP NOTE  --------------------------------------------------------------------------------    24 hour events/subjective:    TPN stopped  tolerating TF & full liquids  no abdominal pain  No N/ V-   (+) flatus/ (+)BM   no f/c/s  no dyspnea/ sob/ palp/ cp        Diet:  Diet, Full Liquid:   Consistent Carbohydrate No Snacks (CSTCHO)  Tube Feeding Modality: Nasogastric  Glucerna 1.5 Sohail (GLUCERNA1.5RTH)  Total Volume for 24 Hours (mL): 1320  Continuous  Starting Tube Feed Rate mL per Hour: 40  Until Goal Tube Feed Rate (mL per Hour): 55  Tube Feed Duration (in Hours): 24  Tube Feed Start Time: 10:00 (09-12-20 @ 10:08)      Appetite: [  ]Poor [ x ]Adequate [  ]Good  Caloric intake:  [  x ]  Adequate   [   ] Inadequate    ROS: General/ GI see HPI  all other systems negative      ALLERGIES & MEDICATIONS  --------------------------------------------------------------------------------  No Known Allergies      STANDING INPATIENT MEDICATIONS    chlorhexidine 2% Cloths 1 Application(s) Topical <User Schedule>  dextrose 5%. 1000 milliLiter(s) IV Continuous <Continuous>  dextrose 50% Injectable 12.5 Gram(s) IV Push once  dextrose 50% Injectable 25 Gram(s) IV Push once  dextrose 50% Injectable 25 Gram(s) IV Push once  furosemide   Injectable 40 milliGRAM(s) IV Push every 8 hours  heparin   Injectable 5000 Unit(s) SubCutaneous every 8 hours  hydrALAZINE 50 milliGRAM(s) Oral every 8 hours  insulin lispro (HumaLOG) corrective regimen sliding scale   SubCutaneous every 6 hours  insulin lispro Injectable (HumaLOG) 3 Unit(s) SubCutaneous three times a day before meals  insulin NPH human recombinant 5 Unit(s) SubCutaneous every 12 hours  methylPREDNISolone sodium succinate Injectable 8 milliGRAM(s) IV Push <User Schedule>  pantoprazole  Injectable 40 milliGRAM(s) IV Push daily  tacrolimus 5 milliGRAM(s) Oral <User Schedule>  vancomycin    Solution 500 milliGRAM(s) Oral every 6 hours      PRN INPATIENT MEDICATION  benzocaine 15 mG/menthol 3.6 mG (Sugar-Free) Lozenge 1 Lozenge Oral every 6 hours PRN  dextrose 40% Gel 15 Gram(s) Oral once PRN  glucagon  Injectable 1 milliGRAM(s) IntraMuscular once PRN        VITALS/PHYSICAL EXAM  --------------------------------------------------------------------------------  T(C): 36.2 (09-14-20 @ 08:00), Max: 36.6 (09-13-20 @ 12:00)  HR: 119 (09-14-20 @ 10:00) (111 - 127)  BP: --  RR: 13 (09-14-20 @ 10:00) (12 - 18)  SpO2: 97% (09-14-20 @ 10:00) (95% - 100%)  Wt(kg): --        09-13-20 @ 07:01  -  09-14-20 @ 07:00  --------------------------------------------------------  IN: 1628.3 mL / OUT: 1830 mL / NET: -201.7 mL    09-14-20 @ 07:01  -  09-14-20 @ 10:01  --------------------------------------------------------  IN: 120 mL / OUT: 365 mL / NET: -245 mL      PHYSICAL EXAM:  GEN:  guarded but stable, WD/WN/WG, (+)KO TF  HEENT: NC/AT, PERRL, mucosa moist & throat clear, no trachea midline w/ neck supple  GI: (+) BS, softly distended, nontender   MSK:  FROM x4, no deformities  VASCULAR: Equally warm, no cyanosis, clubbing,        Lt IJ TLC w/ dressing c/d/i,         BLE mild edema equal,  R>LUE edema- soft nontender w/o cord or erythema  Neurology; no focal deficits, weakened strength & sensation grossly intact  Psych: normal affect  Skin: warm,  moist, & w/ good turgor      LABS/ CULTURES/ RADIOLOGY:              7.8    9.69  >-----------<  103      [09-14-20 @ 00:35]              25.3     137  |  94  |  78  ----------------------------<  126      [09-14-20 @ 00:35]  4.1   |  29  |  1.27        Ca     8.4     [09-14-20 @ 00:35]      Mg     2.3     [09-14-20 @ 00:35]      Phos  4.8     [09-14-20 @ 00:35]    TPro  6.1  /  Alb  2.8  /  TBili  0.7  /  DBili  x   /  AST  67  /  ALT  46  /  AlkPhos  196  [09-14-20 @ 00:35]          Blood Gas Arterial - Calcium, Ionized: 1.09 mmoL/L (09-14-20 @ 00:06)  Blood Gas Arterial - Calcium, Ionized: 1.12 mmoL/L (09-13-20 @ 00:01)      CAPILLARY BLOOD GLUCOSE  POCT Blood Glucose.: 143 mg/dL (14 Sep 2020 07:35)  POCT Blood Glucose.: 136 mg/dL (14 Sep 2020 05:24)  POCT Blood Glucose.: 196 mg/dL (13 Sep 2020 17:15)  POCT Blood Glucose.: 197 mg/dL (13 Sep 2020 12:39)    Prealbumin, Serum: 25 mg/dL (09-13-20 @ 05:27)  Prealbumin, Serum: 22 mg/dL (09-08-20 @ 16:22)  Prealbumin, Serum: 10 mg/dL (09-02-20 @ 08:12)  Prealbumin, Serum: 24 mg/dL (08-25-20 @ 15:10)      Triglycerides, Serum: 188 mg/dL (09-12-20 @ 00:39)  Triglycerides, Serum: 185 mg/dL (09-11-20 @ 00:31)  Triglycerides, Serum: 200 mg/dL (09-10-20 @ 00:41)  Triglycerides, Serum: 289 mg/dL (09-09-20 @ 00:29)  Triglycerides, Serum: 326 mg/dL (09-08-20 @ 11:05)    < from: Xray Chest 1 View- PORTABLE-Routine (Xray Chest 1 View- PORTABLE-Routine in AM.) (09.13.20 @ 02:41) >  INTERPRETATION:  CLINICAL INFORMATION: Status post cardiac thoracic surgery.    TECHNIQUE: One view of the chest.    COMPARISON: Chest radiograph 9/11/2020.    FINDINGS:    LUNGS: Unchanged right lower lung linear opacities. Left lung is clear.    PLEURA: Elevated right hemidiaphragm. Unchanged small right pleural effusion. No pneumothorax.    HEART AND MEDIASTINUM: Enteric tube is present, thetip is not visualized. Left IJ central venous catheter terminates in the left brachiocephalic vein unchanged. Status post median sternotomy. The upper sternal wire is fractured as on the prior study. Heart size is poorly assessed.    SKELETON: Unremarkable skeletal structures.      IMPRESSION:    Interval removal of left upper extremity PICC. Unchanged right lower lung atelectasis and small pleural effusion.    < end of copied text >

## 2020-09-14 NOTE — PROGRESS NOTE ADULT - PROBLEM SELECTOR PLAN 5
- Appropriate response to 1u pRBCs 9/10.  - Repeat hemolysis labs look better without intervention.  - Appreciate Heme input. Given severity of C.diff infection, unable to give higher dose steroids at this time, but will reassess as he improves.  - Case reviewed with Blood Bank Director, Dr. Tomlinson.  - G6PD level normal  - Will continue to assess for signs of hemolysis on tacrolimus

## 2020-09-15 LAB
ALBUMIN SERPL ELPH-MCNC: 2.8 G/DL — LOW (ref 3.3–5)
ALP SERPL-CCNC: 174 U/L — HIGH (ref 40–120)
ALT FLD-CCNC: 47 U/L — HIGH (ref 10–45)
ANION GAP SERPL CALC-SCNC: 10 MMOL/L — SIGNIFICANT CHANGE UP (ref 5–17)
AST SERPL-CCNC: 57 U/L — HIGH (ref 10–40)
BILIRUB SERPL-MCNC: 0.6 MG/DL — SIGNIFICANT CHANGE UP (ref 0.2–1.2)
BLD GP AB SCN SERPL QL: NEGATIVE — SIGNIFICANT CHANGE UP
BUN SERPL-MCNC: 74 MG/DL — HIGH (ref 7–23)
CALCIUM SERPL-MCNC: 8.1 MG/DL — LOW (ref 8.4–10.5)
CHLORIDE SERPL-SCNC: 94 MMOL/L — LOW (ref 96–108)
CO2 SERPL-SCNC: 31 MMOL/L — SIGNIFICANT CHANGE UP (ref 22–31)
CREAT SERPL-MCNC: 1.32 MG/DL — HIGH (ref 0.5–1.3)
GAS PNL BLDA: SIGNIFICANT CHANGE UP
GLUCOSE BLDC GLUCOMTR-MCNC: 120 MG/DL — HIGH (ref 70–99)
GLUCOSE BLDC GLUCOMTR-MCNC: 167 MG/DL — HIGH (ref 70–99)
GLUCOSE SERPL-MCNC: 107 MG/DL — HIGH (ref 70–99)
HCT VFR BLD CALC: 26.5 % — LOW (ref 39–50)
HGB BLD-MCNC: 7.9 G/DL — LOW (ref 13–17)
MAGNESIUM SERPL-MCNC: 2.3 MG/DL — SIGNIFICANT CHANGE UP (ref 1.6–2.6)
MCHC RBC-ENTMCNC: 29.8 GM/DL — LOW (ref 32–36)
MCHC RBC-ENTMCNC: 31.2 PG — SIGNIFICANT CHANGE UP (ref 27–34)
MCV RBC AUTO: 104.7 FL — HIGH (ref 80–100)
NRBC # BLD: 6 /100 WBCS — HIGH (ref 0–0)
PHOSPHATE SERPL-MCNC: 4.2 MG/DL — SIGNIFICANT CHANGE UP (ref 2.5–4.5)
PLATELET # BLD AUTO: 94 K/UL — LOW (ref 150–400)
POTASSIUM SERPL-MCNC: 3.8 MMOL/L — SIGNIFICANT CHANGE UP (ref 3.5–5.3)
POTASSIUM SERPL-SCNC: 3.8 MMOL/L — SIGNIFICANT CHANGE UP (ref 3.5–5.3)
PROT SERPL-MCNC: 6.1 G/DL — SIGNIFICANT CHANGE UP (ref 6–8.3)
RBC # BLD: 2.53 M/UL — LOW (ref 4.2–5.8)
RBC # FLD: 26.3 % — HIGH (ref 10.3–14.5)
RH IG SCN BLD-IMP: POSITIVE — SIGNIFICANT CHANGE UP
SARS-COV-2 RNA SPEC QL NAA+PROBE: SIGNIFICANT CHANGE UP
SODIUM SERPL-SCNC: 135 MMOL/L — SIGNIFICANT CHANGE UP (ref 135–145)
TACROLIMUS SERPL-MCNC: 8.1 NG/ML — SIGNIFICANT CHANGE UP
WBC # BLD: 8.52 K/UL — SIGNIFICANT CHANGE UP (ref 3.8–10.5)
WBC # FLD AUTO: 8.52 K/UL — SIGNIFICANT CHANGE UP (ref 3.8–10.5)

## 2020-09-15 PROCEDURE — 71045 X-RAY EXAM CHEST 1 VIEW: CPT | Mod: 26

## 2020-09-15 PROCEDURE — 99232 SBSQ HOSP IP/OBS MODERATE 35: CPT

## 2020-09-15 PROCEDURE — 90791 PSYCH DIAGNOSTIC EVALUATION: CPT

## 2020-09-15 PROCEDURE — 99292 CRITICAL CARE ADDL 30 MIN: CPT

## 2020-09-15 PROCEDURE — 99233 SBSQ HOSP IP/OBS HIGH 50: CPT

## 2020-09-15 PROCEDURE — 99291 CRITICAL CARE FIRST HOUR: CPT

## 2020-09-15 RX ORDER — CHLORHEXIDINE GLUCONATE 213 G/1000ML
1 SOLUTION TOPICAL
Refills: 0 | Status: DISCONTINUED | OUTPATIENT
Start: 2020-09-15 | End: 2020-10-02

## 2020-09-15 RX ORDER — FUROSEMIDE 40 MG
40 TABLET ORAL EVERY 12 HOURS
Refills: 0 | Status: DISCONTINUED | OUTPATIENT
Start: 2020-09-15 | End: 2020-09-25

## 2020-09-15 RX ORDER — POTASSIUM CHLORIDE 20 MEQ
40 PACKET (EA) ORAL ONCE
Refills: 0 | Status: COMPLETED | OUTPATIENT
Start: 2020-09-15 | End: 2020-09-15

## 2020-09-15 RX ORDER — TACROLIMUS 5 MG/1
4 CAPSULE ORAL
Refills: 0 | Status: DISCONTINUED | OUTPATIENT
Start: 2020-09-16 | End: 2020-09-19

## 2020-09-15 RX ORDER — PANTOPRAZOLE SODIUM 20 MG/1
40 TABLET, DELAYED RELEASE ORAL
Refills: 0 | Status: DISCONTINUED | OUTPATIENT
Start: 2020-09-16 | End: 2020-10-02

## 2020-09-15 RX ORDER — TACROLIMUS 5 MG/1
2 CAPSULE ORAL
Refills: 0 | Status: COMPLETED | OUTPATIENT
Start: 2020-09-15 | End: 2020-09-15

## 2020-09-15 RX ADMIN — Medication 2: at 17:37

## 2020-09-15 RX ADMIN — Medication 50 MILLIGRAM(S): at 14:07

## 2020-09-15 RX ADMIN — CHLORHEXIDINE GLUCONATE 1 APPLICATION(S): 213 SOLUTION TOPICAL at 05:10

## 2020-09-15 RX ADMIN — PANTOPRAZOLE SODIUM 40 MILLIGRAM(S): 20 TABLET, DELAYED RELEASE ORAL at 11:22

## 2020-09-15 RX ADMIN — Medication 500 MILLIGRAM(S): at 14:07

## 2020-09-15 RX ADMIN — Medication 40 MILLIGRAM(S): at 17:36

## 2020-09-15 RX ADMIN — HEPARIN SODIUM 5000 UNIT(S): 5000 INJECTION INTRAVENOUS; SUBCUTANEOUS at 14:07

## 2020-09-15 RX ADMIN — Medication 3 UNIT(S): at 11:41

## 2020-09-15 RX ADMIN — HEPARIN SODIUM 5000 UNIT(S): 5000 INJECTION INTRAVENOUS; SUBCUTANEOUS at 05:10

## 2020-09-15 RX ADMIN — HUMAN INSULIN 5 UNIT(S): 100 INJECTION, SUSPENSION SUBCUTANEOUS at 05:19

## 2020-09-15 RX ADMIN — Medication 40 MILLIGRAM(S): at 05:10

## 2020-09-15 RX ADMIN — Medication 3 UNIT(S): at 08:12

## 2020-09-15 RX ADMIN — CHLORHEXIDINE GLUCONATE 1 APPLICATION(S): 213 SOLUTION TOPICAL at 06:08

## 2020-09-15 RX ADMIN — Medication 10 MILLIGRAM(S): at 09:50

## 2020-09-15 RX ADMIN — Medication 500 MILLIGRAM(S): at 09:42

## 2020-09-15 RX ADMIN — Medication 500 MILLIGRAM(S): at 20:35

## 2020-09-15 RX ADMIN — TACROLIMUS 2 MILLIGRAM(S): 5 CAPSULE ORAL at 20:35

## 2020-09-15 RX ADMIN — Medication 500 MILLIGRAM(S): at 02:19

## 2020-09-15 RX ADMIN — HEPARIN SODIUM 5000 UNIT(S): 5000 INJECTION INTRAVENOUS; SUBCUTANEOUS at 21:02

## 2020-09-15 RX ADMIN — TACROLIMUS 5 MILLIGRAM(S): 5 CAPSULE ORAL at 08:05

## 2020-09-15 RX ADMIN — HUMAN INSULIN 5 UNIT(S): 100 INJECTION, SUSPENSION SUBCUTANEOUS at 17:36

## 2020-09-15 RX ADMIN — Medication 40 MILLIEQUIVALENT(S): at 00:13

## 2020-09-15 RX ADMIN — Medication 50 MILLIGRAM(S): at 21:02

## 2020-09-15 NOTE — PROGRESS NOTE ADULT - SUBJECTIVE AND OBJECTIVE BOX
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 Patient was treated w/ prednisone and Rituximab. Tacro was discontinued and patient was also transitioned to everolimus  Admitted  6/16-6/19  for hyperglycemia.  Reported to transplant team having one done black tarry stool-  instructed to  present to [facility] for hemolytic anemia. Patient has been following with Hematology outpatient.  Denies CP  N/V diarrhea SOB. (11 Aug 2020 20:08)      Surgery/Hospital course:  8/11 Admitted to [facility] with symptomatic anemia  8/13 EGD for investigation of tarry stools  8/15 SB capsule: stomach & SB lesions, likely ulcers.  8/17 EGD/push enteroscopy w/ angioectasias/erosions in small bowel. Gastropathy and esophagitis. Ulcer seen on VCE most likely scope trauma.    8/18 VSS transferred back to floor.   8/20 VS 9.0/28.4 stable Gastric biopsy results LA Grade A esophagitis.  - Acute gastritis. Biopsies taken to r/o CMV, No ulceration seen despite finding on capsule endoscopy - likely 2/2 scope  trauma that has since resolved. Pathology pending.  8/21 VSS H/H  8.1/25.7  trending down will monitor prednisone taper 30 mg today. Procardia 120 initiated today RHC biopsy Monday.   8/22 VVS; Patient reports loose stools x 2-3 days with 1 episode today.  Stool sent for C-dif and GI PCR. Abdominal X-ray revealed: dilated loops of bowel. Abdomen distended.  Patient denies N/V. GI called to re-evaluate. Continue with current medication regimen.  Awaiting for upcoming cardiac biopsy on Monday 8/24.  8/23   vss    creat up to 2.28 ,  repeating CMP,  TTE ordered  Bladder scan   8/24   cardiac bx cancelled   elev creat  to 2.74   cardio renal cslt called,    + CDIF   inc vanco to 500 q6   ID following  8/25 VSS: RHC today as per transplant team; transfuse 2 units PRBC today as per Dr. Viramontes; continue vanco for cdiff; transfer patient to CTU after cath today   8/26. Dieretic challenge with good response   8/27: Downgraded to the floor then upgraded to the CTU again for concerning of abd distention   8/28 CT ABD & Pelvis reveals pancolitis  8/30: repeat CT scan of abd, gen surgery called to re-evaluated pt.  8/31: RUE duplex negative for DVT  9/4 NGT clamped, minimal residual. NGT removed. started sips   9/6 tolerating clear liquid diet  9/8 Bronch  9/9 1 prbc   9/10 increased tube feeds to 20cc/hr and tpn decreased to 63cc/hr from 125cc/hr   9/12 TPN discontinued  9/14 Diet advanced, tube feeds at 75% goal, central line removed and midline inserted.  9/14 pt completed approp 75 % of meal.   pt ambulated to the door this afternoon  9/15 Pt tolerating PO diet with supplemental tube feeds. calorie count iniated    Today/Overnight:    Vital Signs Last 24 Hrs  T(C): 36.8 (15 Sep 2020 16:00), Max: 36.8 (15 Sep 2020 03:00)  T(F): 98.2 (15 Sep 2020 16:00), Max: 98.2 (15 Sep 2020 03:00)  HR: 125 (15 Sep 2020 19:00) (112 - 130)  BP: 95/62 (15 Sep 2020 19:00) (94/63 - 101/71)  BP(mean): 73 (15 Sep 2020 19:00) (73 - 82)  RR: 31 (15 Sep 2020 19:00) (12 - 36)  SpO2: 96% (15 Sep 2020 19:00) (95% - 100%)  ============================I/O===========================  I&O's Detail    14 Sep 2020 07:01  -  15 Sep 2020 07:00  --------------------------------------------------------  IN:    Glucerna 1.5: 960 mL    Oral Fluid: 170 mL  Total IN: 1130 mL    OUT:    Indwelling Catheter - Urethral (mL): 1805 mL  Total OUT: 1805 mL    Total NET: -675 mL      15 Sep 2020 07:01  -  15 Sep 2020 19:13  --------------------------------------------------------  IN:    Glucerna 1.5: 440 mL    Oral Fluid: 717 mL  Total IN: 1157 mL    OUT:    Indwelling Catheter - Urethral (mL): 805 mL  Total OUT: 805 mL    Total NET: 352 mL    ============================ LABS =========================                        7.9    8.52  )-----------( 94       ( 15 Sep 2020 00:17 )             26.5     09-15    135  |  94<L>  |  74<H>  ----------------------------<  107<H>  3.8   |  31  |  1.32<H>    Ca    8.1<L>      15 Sep 2020 00:17  Phos  4.2     09-15  Mg     2.3     09-15    TPro  6.1  /  Alb  2.8<L>  /  TBili  0.6  /  DBili  x   /  AST  57<H>  /  ALT  47<H>  /  AlkPhos  174<H>  09-15    LIVER FUNCTIONS - ( 15 Sep 2020 00:17 )  Alb: 2.8 g/dL / Pro: 6.1 g/dL / ALK PHOS: 174 U/L / ALT: 47 U/L / AST: 57 U/L / GGT: x             ABG - ( 15 Sep 2020 05:14 )  pH, Arterial: 7.44  pH, Blood: x     /  pCO2: 50    /  pO2: 89    / HCO3: 34    / Base Excess: 8.9   /  SaO2: 98        ======================Micro/Rad/Cardio=================  Culture: Reviewed   CXR: Reviewed  Echo: Reviewed  ======================================================  PAST MEDICAL & SURGICAL HISTORY:  H/O hemolytic anemia  H/O autoimmune hemolytic anemia  Knee pain, right  HLD (hyperlipidemia)  Former smoker  DVT of upper extremity (deep vein thrombosis)  Hepatitis C virus  GIB (gastrointestinal bleeding)  Ventricular fibrillation s/p AICD  PAF (paroxysmal atrial fibrillation) - on xarelto  Non-Ischemic Cardiomyopathy  SVT (Supraventricular Tachycardia)  HTN  CHF (Congestive Heart Failure)  S/P right heart catheterization: biopsy multiple  H/O heart transplant 2/2018  Status post left hip replacement  History of Prior Ablation Treatment for afib  AICD (Automatic Cardioverter/Defibrillator) Present - [employer] [place] with 1 [employer] lead4/1/09- explanted and replaced with [employer] 2 leads on 9/2/09    ========================ASSESSMENT ================  Heart transplant 2/2018 for ACC/AHA stage D NICM   Resolved GI bleed, Melena s/p EGD  Supraventricular tachycardia  Severe hypertension  Hyperlactatemia acidosis, resolved  Acute respiratory failure, resolved  Leukopenia- resolved  Acute blood loss anemia, stable   Abdominal distention  ileus  CKD Stage III  C. diff diarrhea  C. diff colitis  Klebsiella oxytoca bacteremia  Sepsis  Azotemia 2/2 CKD and Steroids   Pancolitis  Macrocytic hemolytic anemia with (+) IGg cornel     Plan:  ====================== NEUROLOGY=====================  Nonfocal, continue to monitor neuro status   Continue physical therapy as tolerated     ==================== RESPIRATORY======================  Persistent RLL Collapse on CXR requiring bronchoscopy on 9/08 with elevated hemidiaphragm.  Comfortable on room air, SpO2 %   Encourage incentive spirometry, continue pulse ox monitoring. Encourage chest PT.      ====================CARDIOVASCULAR==================  S/P Heart transplant 2/2018 for ACC/AHA stage D NICM, Transplant team following.   Tacrolimus restarted, will goal of 8-10 being managed by heart failure, off Cellcept while on high dose steroids.   ASA on hold due to hx of GI bleed / ulceration.  Continue Hydralazine 50mg PO q8h for a goal MAP of 70-80  Plan to d/c ananya    hydrALAZINE 50 milliGRAM(s) Oral every 8 hours  predniSONE   Tablet 10 milliGRAM(s) Oral <User Schedule>    ===================HEMATOLOGIC/ONC ===================  Anemia S/P multiple blood transfusions.   Continue closely monitoring H&H and transfuse prn.   Hx of Autoimmune hemolytic anemia, patient with (+) Igg Cornel,  f/u am hemolysis labs. f/u G6PD     VTE prophylaxis, heparin   Injectable 5000 Unit(s) SubCutaneous every 8 hours    ===================== RENAL =========================  Anasarca, NORMAN on CKD stage III   Continue monitoring urine output, I&OS, BUN/Cr   Diuresis with Lasix, decreased from q8h to q12h    furosemide   Injectable 40 milliGRAM(s) IV Push every 12 hours    ==================== GASTROINTESTINAL===================  Patient with ileus in setting of C.Diff- NGT clamped 9/4 for 4 hrs, no residual.  Monitor BMs and flatus. Now tolerating tube feeds, Glucerna 1.5 Sohail @ goal rate 40 cc/hr  CT A/P on 8/30 with Pancolitis and small volume abdominopelvic ascites, not significantly changed since 8/20/2020.  General surgery following - continue serial abdominal exams. No surgical intervention at this time.   Diet advanced from full liquid to CC/ low fiber diet  Loose BM x4 9/14 overnight, + c. diff, on PO vanco    dextrose 5%. 1000 milliLiter(s) (50 mL/Hr) IV Continuous <Continuous>  sodium chloride 0.9% lock flush 10 milliLiter(s) IV Push every 1 hour PRN Pre/post blood products, medications, blood draw, and to maintain line patency    =======================    ENDOCRINE  =====================  Glucose control with humalog sliding scale, NPH human recombinant    dextrose 40% Gel 15 Gram(s) Oral once PRN Blood Glucose LESS THAN 70 milliGRAM(s)/deciliter  dextrose 50% Injectable 12.5 Gram(s) IV Push once  dextrose 50% Injectable 25 Gram(s) IV Push once  dextrose 50% Injectable 25 Gram(s) IV Push once  glucagon  Injectable 1 milliGRAM(s) IntraMuscular once PRN Glucose LESS THAN 70 milligrams/deciliter  insulin lispro (HumaLOG) corrective regimen sliding scale   SubCutaneous every 6 hours  insulin lispro Injectable (HumaLOG) 3 Unit(s) SubCutaneous three times a day before meals  insulin NPH human recombinant 5 Unit(s) SubCutaneous every 12 hours    ========================INFECTIOUS DISEASE================  BCx 8/13: (+)Klebsiella bacteremia, has since been cleared.  Clostridium difficile PCR 8/23: (+), continue with vancomycin PO q6h  - S/P 5 days of IVIG (8/29-9/1)  - S/P Eravaycline (8/31- 9/6)  - S/P IV flagyl discontinued (8/24-9/1)  - S/P Rectal Vanco (8/23-9/11)    Transplant prophylaxis with Posaconazole and Atovaquone on hold due to ileus    vancomycin    Solution 500 milliGRAM(s) Oral every 6 hours      Patient requires continuous monitoring with bedside rhythm monitoring, pulse oximetry monitoring, and continuous central venous and arterial pressure monitoring; and intermittent blood gas analysis.  Care plan discussed with ICU care team.    Patient remained critical, at risk for life threatening decompensation.   I have spent 35 minutes providing acute care with multiple re-evaluations throughout the evening.     By signing my name below, I, Tonny Sheldon, attest that this documentation has been prepared under the direction and in the presence of Deyvi Frye NP.  Electronically signed: Katty Trejo, 09-15-20 @ 19:13    I, Deyvi Frye NP, personally performed the services described in this documentation. All medical record entries made by the scribe were at my direction and in my presence. I have reviewed the chart and agree that the record reflects my personal performance and is accurate and complete  Electronically signed: Deyvi Frye NP, 09-15-20 @ 19:13

## 2020-09-15 NOTE — PROGRESS NOTE ADULT - ASSESSMENT
Mood improved. Less anxious. Feels he is making some progress. Happy with news he is going to floor once bed available. Some concern about his overall health and recovery. Sleeping well at night with naps during day. Appetite good. Receptive to support and validation. Denies symptoms of anxiety/depression.     Dx: Adjustment disorder; unspecified F43.20; r/o Delirium F05     Recommendations:   Explain all information in simple, concise language with teach back to ensure understanding  Include support system in all education   Holistic RN  Behavioral Cardiology will continue to follow

## 2020-09-15 NOTE — CHART NOTE - NSCHARTNOTEFT_GEN_A_CORE
Pt seen for Calorie count initiation.     Source: RN, Medical record, TPN team, Transplant team    Chart reviewed, events noted: 70 year old male with PMHx of NICM, s/p HM2 LVAD and OHT in 2/2018, with known coronary fistula. Recent admission for hemolytic anemia, now admitted for hyperglycemia and GIB. EGD and capsule study negative. Pt now C-Diff colitis w/o toxic megacolon. Required TPN for acute severe malnutrition in the setting of Colitis and ileus.  TPN has now been discontinues since 9/12. Pt tolerating EN and full liquid diet.     Current diet: Low Fiber, Consistent CHO, Glucerna x2 daily, + EN via NGT  Glucerna 1.5 @40ml/rlp50agm. Will provide: 1440kcals and 79gm protein. Goal rate will provide 1980kcals and 108gm protein. (19kcals/kg and 1.0gm pro/kg based on dosing Wt: 75.4kg). This represents 75% of estimated nutrient needs    Medications and Labs reviewed.     Pt seen: reports having felt well and tolerating low fiber diet well yesterday. States he drank Glucerna x2 on 9/14. Pt awaiting breakfast today.   Pt and RN aware of 3 day calorie count starting. Plan to monitor PO intake from 9/15-9/17. RD will review upon follow up.     Estimated Needs: based on dosing Wt: 75.2 Kg,   Energy: (25-30kcal/kg): 1880-2256kcal   Protein:  (1.4-1.6g protein/kg): 105-135g protein    Previous Nutrition Diagnosis: altered nutrition lab values  Nutrition Diagnosis is: defer at this time     Previous Nutrition Diagnosis: inadequate oral intake  Nutrition Diagnosis is: on going at this time.     Previous Nutrition Diagnosis: acute severe protein calorie malnutrition  Nutrition Diagnosis: remains appropriate, ongoing diet advancement     New Nutrition Diagnosis: None     Recommended Interventions:   1. Continue consistent CHO, Low Fiber, + Glucerna x2 daily   2. Continue  Glucerna 1.5 @40ml/[id number]. Will provide: 1440kcals and 79gm protein. Goal rate will provide 1980kcals and 108gm protein. (19kcals/kg and 1.0gm pro/kg based on dosing Wt: 75.4kg). This represents 75% of estimated nutrient needs   3. Calorie count form 9/15-9/17. RD to review upon follow up   4. Trend GI tolerance   5. Trend BG levels, renal indices, LFT's, electrolytes and triglycerides     Monitoring and Evaluation:   Continue to monitor nutritional intake, tolerance to diet prescription, weights, labs, skin integrity  RD remains available upon request and will follow up per protocol  Gabbi Flowers RD, CDN, [facility] Pager #[phone].

## 2020-09-15 NOTE — PROGRESS NOTE ADULT - SUBJECTIVE AND OBJECTIVE BOX
INFECTIOUS DISEASES FOLLOW UP-- Sujey Lujan  [phone]    This is a follow up note for this  70yMale with  Anemia  poor appetite  only one large loose BM today        ROS:  CONSTITUTIONAL:  No fever, poor appetite  CARDIOVASCULAR:  No chest pain or palpitations  RESPIRATORY:  No dyspnea  GASTROINTESTINAL:  No nausea, vomiting, c/o abdominal distension/discomfort  GENITOURINARY:  No dysuria  NEUROLOGIC:  No headache,     Allergies    No Known Allergies    Intolerances        ANTIBIOTICS/RELEVANT:  antimicrobials  vancomycin    Solution 500 milliGRAM(s) Oral every 6 hours    immunologic:  tacrolimus 2 milliGRAM(s) Oral <User Schedule>    OTHER:  benzocaine 15 mG/menthol 3.6 mG (Sugar-Free) Lozenge 1 Lozenge Oral every 6 hours PRN  chlorhexidine 2% Cloths 1 Application(s) Topical <User Schedule>  dextrose 40% Gel 15 Gram(s) Oral once PRN  dextrose 5%. 1000 milliLiter(s) IV Continuous <Continuous>  dextrose 50% Injectable 12.5 Gram(s) IV Push once  dextrose 50% Injectable 25 Gram(s) IV Push once  dextrose 50% Injectable 25 Gram(s) IV Push once  furosemide   Injectable 40 milliGRAM(s) IV Push every 12 hours  glucagon  Injectable 1 milliGRAM(s) IntraMuscular once PRN  heparin   Injectable 5000 Unit(s) SubCutaneous every 8 hours  hydrALAZINE 50 milliGRAM(s) Oral every 8 hours  insulin lispro (HumaLOG) corrective regimen sliding scale   SubCutaneous every 6 hours  insulin lispro Injectable (HumaLOG) 3 Unit(s) SubCutaneous three times a day before meals  insulin NPH human recombinant 5 Unit(s) SubCutaneous every 12 hours  predniSONE   Tablet 10 milliGRAM(s) Oral <User Schedule>  sodium chloride 0.9% lock flush 10 milliLiter(s) IV Push every 1 hour PRN      Objective:  Vital Signs Last 24 Hrs  T(C): 36.8 (15 Sep 2020 16:00), Max: 36.8 (15 Sep 2020 03:00)  T(F): 98.2 (15 Sep 2020 16:00), Max: 98.2 (15 Sep 2020 03:00)  HR: 130 (15 Sep 2020 18:00) (112 - 130)  BP: 101/71 (15 Sep 2020 18:00) (94/63 - 101/71)  BP(mean): 82 (15 Sep 2020 18:00) (73 - 82)  RR: 36 (15 Sep 2020 18:00) (12 - 36)  SpO2: 100% (15 Sep 2020 18:00) (95% - 100%)    PHYSICAL EXAM:  Constitutional:no acute distress  Eyes:WALT, EOMI  Ear/Nose/Throat: no oral lesions, 	  Respiratory: clear BL  Cardiovascular: S1S2  Gastrointestinal: distended but soft  Extremities:no e/e/c  No Lymphadenopathy  IV sites not inflammed.    LABS:                        7.9    8.52  )-----------( 94       ( 15 Sep 2020 00:17 )             26.5     09-15    135  |  94<L>  |  74<H>  ----------------------------<  107<H>  3.8   |  31  |  1.32<H>    Ca    8.1<L>      15 Sep 2020 00:17  Phos  4.2     09-15  Mg     2.3     09-15    TPro  6.1  /  Alb  2.8<L>  /  TBili  0.6  /  DBili  x   /  AST  57<H>  /  ALT  47<H>  /  AlkPhos  174<H>  09-15          MICROBIOLOGY:    no new cultures        RECENT CULTURES:      RADIOLOGY & ADDITIONAL STUDIES:    < from: Xray Chest 1 View- PORTABLE-Routine (Xray Chest 1 View- PORTABLE-Routine in AM.) (09.15.20 @ 03:36) >  IMPRESSION:  Stable right atelectasis and small right pleural effusion.    < end of copied text >

## 2020-09-15 NOTE — PROGRESS NOTE ADULT - SUBJECTIVE AND OBJECTIVE BOX
[facility] NUTRITION SUPPORT / TPN -- FOLLOW UP NOTE  --------------------------------------------------------------------------------    24 hour events/subjective:  calorie count iniated  tolerating TF & LRD ADA diet  no abdominal pain  not feeling bloated  No N/ V-   (+) flatus/ (+)BM   no f/c/s  no dyspnea/ sob/ palp/ cp      Diet:  Diet, Consistent Carbohydrate/No Snacks:   Fiber/Residue Restricted (LOWFIBER)  Tube Feeding Modality: Nasogastric  Glucerna 1.5 Sohail (GLUCERNA1.5RTH)  Total Volume for 24 Hours (mL): 960  Continuous  Starting Tube Feed Rate mL per Hour: 40  Until Goal Tube Feed Rate (mL per Hour): 40  Tube Feed Duration (in Hours): 24  Tube Feed Start Time: 11:45  Supplement Feeding Modality:  Oral  Glucerna Shake Cans or Servings Per Day:  2       Frequency:  Daily (09-14-20 @ 11:48)      Appetite: [  x]Poor [  ]Adequate [  ]Good  Caloric intake:  [   ]  Adequate   [ x  ] Inadequate    ROS: General/ GI see HPI  all other systems negative      ALLERGIES & MEDICATIONS  --------------------------------------------------------------------------------  No Known Allergies      STANDING INPATIENT MEDICATIONS    chlorhexidine 2% Cloths 1 Application(s) Topical <User Schedule>  dextrose 5%. 1000 milliLiter(s) IV Continuous <Continuous>  dextrose 50% Injectable 12.5 Gram(s) IV Push once  dextrose 50% Injectable 25 Gram(s) IV Push once  dextrose 50% Injectable 25 Gram(s) IV Push once  furosemide   Injectable 40 milliGRAM(s) IV Push every 12 hours  heparin   Injectable 5000 Unit(s) SubCutaneous every 8 hours  hydrALAZINE 50 milliGRAM(s) Oral every 8 hours  insulin lispro (HumaLOG) corrective regimen sliding scale   SubCutaneous every 6 hours  insulin lispro Injectable (HumaLOG) 3 Unit(s) SubCutaneous three times a day before meals  insulin NPH human recombinant 5 Unit(s) SubCutaneous every 12 hours  pantoprazole  Injectable 40 milliGRAM(s) IV Push daily  predniSONE   Tablet 10 milliGRAM(s) Oral <User Schedule>  tacrolimus 5 milliGRAM(s) Oral <User Schedule>  vancomycin    Solution 500 milliGRAM(s) Oral every 6 hours      PRN INPATIENT MEDICATION  benzocaine 15 mG/menthol 3.6 mG (Sugar-Free) Lozenge 1 Lozenge Oral every 6 hours PRN  dextrose 40% Gel 15 Gram(s) Oral once PRN  glucagon  Injectable 1 milliGRAM(s) IntraMuscular once PRN  sodium chloride 0.9% lock flush 10 milliLiter(s) IV Push every 1 hour PRN        VITALS/PHYSICAL EXAM  --------------------------------------------------------------------------------  T(C): 36.6 (09-15-20 @ 12:00), Max: 36.8 (09-15-20 @ 03:00)  HR: 122 (09-15-20 @ 14:00) (112 - 123)  BP: --  RR: 24 (09-15-20 @ 14:00) (12 - 28)  SpO2: 97% (09-15-20 @ 14:00) (93% - 100%)  Wt(kg): --        09-14-20 @ 07:01  -  09-15-20 @ 07:00  --------------------------------------------------------  IN: 1130 mL / OUT: 1805 mL / NET: -675 mL    09-15-20 @ 07:01  -  09-15-20 @ 14:14  --------------------------------------------------------  IN: 290 mL / OUT: 285 mL / NET: 5 mL      PHYSICAL EXAM:  GEN:  guarded but stable, WD/WN/WG, (+)KO TF  HEENT: NC/AT, PERRL, mucosa moist & throat clear, no trachea midline w/ neck supple  GI: (+) BS, softly distended, nontender   MSK:  FROM x4, no deformities  VASCULAR: Equally warm, no cyanosis, clubbing,        Lt IJ TLC w/ dressing c/d/i,         BLE mild edema equal,  R>LUE edema- soft nontender w/o cord or erythema  Neurology; no focal deficits, weakened strength & sensation grossly intact  Psych: normal affect  Skin: warm,  moist, & w/ good turgor        LABS/ CULTURES/ RADIOLOGY:              7.9    8.52  >-----------<  94       [09-15-20 @ 00:17]              26.5     135  |  94  |  74  ----------------------------<  107      [09-15-20 @ 00:17]  3.8   |  31  |  1.32        Ca     8.1     [09-15-20 @ 00:17]      Mg     2.3     [09-15-20 @ 00:17]      Phos  4.2     [09-15-20 @ 00:17]    TPro  6.1  /  Alb  2.8  /  TBili  0.6  /  DBili  x   /  AST  57  /  ALT  47  /  AlkPhos  174  [09-15-20 @ 00:17]      Blood Gas Arterial - Calcium, Ionized: 1.07 mmoL/L (09-15-20 @ 05:14)  Blood Gas Arterial - Calcium, Ionized: 1.10 mmoL/L (09-14-20 @ 23:58)      CAPILLARY BLOOD GLUCOSE  155 (14 Sep 2020 17:00)      POCT Blood Glucose.: 120 mg/dL (15 Sep 2020 10:51)  POCT Blood Glucose.: 155 mg/dL (14 Sep 2020 16:55)    Prealbumin, Serum: 25 mg/dL (09-13-20 @ 05:27)  Prealbumin, Serum: 22 mg/dL (09-08-20 @ 16:22)  Prealbumin, Serum: 10 mg/dL (09-02-20 @ 08:12)  Prealbumin, Serum: 24 mg/dL (08-25-20 @ 15:10)      Triglycerides, Serum: 188 mg/dL (09-12-20 @ 00:39)  Triglycerides, Serum: 185 mg/dL (09-11-20 @ 00:31)  Triglycerides, Serum: 200 mg/dL (09-10-20 @ 00:41)  Triglycerides, Serum: 289 mg/dL (09-09-20 @ 00:29)  Triglycerides, Serum: 326 mg/dL (09-08-20 @ 11:05)

## 2020-09-15 NOTE — PROGRESS NOTE ADULT - SUBJECTIVE AND OBJECTIVE BOX
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 Patient was treated w/ prednisone and Rituximab. Tacro was discontinued and patient was also transitioned to everolimus  Admitted  6/16-6/19  for hyperglycemia.  Reported to transplant team having one done black tarry stool-  instructed to  present to [facility] for hemolytic anemia. Patient has been following with Hematology outpatient.  Denies CP  N/V diarrhea SOB. (11 Aug 2020 20:08)    Surgery/Hospital Course:  8/11 Admitted to [facility] with symptomatic anemia  8/13 EGD for investigation of tarry stools  8/15 SB capsule: stomach & SB lesions, likely ulcers.  8/17 EGD/push enteroscopy w/ angioectasias/erosions in small bowel. Gastropathy and esophagitis. Ulcer seen on VCE most likely scope trauma.    8/18 VSS transferred back to floor.   8/20 VS 9.0/28.4 stable Gastric biopsy results LA Grade A esophagitis.  - Acute gastritis. Biopsies taken to r/o CMV, No ulceration seen despite finding on capsule endoscopy - likely 2/2 scope  trauma that has since resolved. Pathology pending.  8/21 VSS H/H  8.1/25.7  trending down will monitor prednisone taper 30 mg today. Procardia 120 initiated today RHC biopsy Monday.   8/22 VVS; Patient reports loose stools x 2-3 days with 1 episode today.  Stool sent for C-dif and GI PCR. Abdominal X-ray revealed: dilated loops of bowel. Abdomen distended.  Patient denies N/V. GI called to re-evaluate. Continue with current medication regimen.  Awaiting for upcoming cardiac biopsy on Monday 8/24.  8/23   vss    creat up to 2.28 ,  repeating CMP,  TTE ordered  Bladder scan   8/24   cardiac bx cancelled   elev creat  to 2.74   cardio renal cslt called,    + CDIF   inc vanco to 500 q6   ID following  8/25 VSS: RHC today as per transplant team; transfuse 2 units PRBC today as per Dr. Viramontes; continue vanco for cdiff; transfer patient to CTU after cath today   8/26. Dieretic challenge with good response   8/27: Downgraded to the floor then upgraded to the CTU again for concerning of abd distention   8/28 CT ABD & Pelvis reveals pancolitis  8/30: repeat CT scan of abd, gen surgery called to re-evaluated pt.  8/31: RUE duplex negative for DVT  9/4 NGT clamped, minimal residual. NGT removed. started sips   9/6 tolerating clear liquid diet  9/8 Bronch  9/9 1 prbc   9/10 increased tube feeds to 20cc/hr and tpn decreased to 63cc/hr from 125cc/hr   9/12 TPN discontinued  9/14 Diet advanced, tube feeds at 75% goal, central line removed and midline inserted.  9/14 pt completed approp 75 % of meal.   pt ambulated to the door this afternoon,     Today/Overnight:   ·	    REVIEW OF SYSTEMS:  Gen: No fever  EYES/ENT: No visual changes;  No vertigo or throat pain   NECK: No pain   RES:  No shortness of breath or Cough  CARD: No chest pain   GI: No abdominal pain  : No dysuria  NEURO: No weakness  SKIN: No itching, rashes     ICU Vital Signs Last 24 Hrs  T(C): 36.5 (15 Sep 2020 08:00), Max: 36.8 (15 Sep 2020 03:00)  T(F): 97.7 (15 Sep 2020 08:00), Max: 98.2 (15 Sep 2020 03:00)  HR: 113 (15 Sep 2020 08:00) (112 - 121)  BP: --  BP(mean): --  ABP: 101/58 (15 Sep 2020 08:00) (90/49 - 121/62)  ABP(mean): 73 (15 Sep 2020 08:00) (63 - 80)  RR: 25 (15 Sep 2020 08:00) (12 - 43)  SpO2: 100% (15 Sep 2020 08:00) (93% - 100%)      Physical Exam:  Gen:  Awake, alert   CNS: non focal 	  Neck: no JVD  RES : Diminished breath sounds B/L , crackles at bases, no wheezing                    CVS: Sinus tachycardia. Normal S1/S2  Abd: Soft, distended. Bowel sounds present.  Skin: No rash.  Ext: B/L upper extremity edema R>L, +2 edema b/l LE.  Lines: Right Radial [place] 9/8, 9/3 L IJ Triple lumen      ============================I/O===========================   I&O's Detail    14 Sep 2020 07:01  -  15 Sep 2020 07:00  --------------------------------------------------------  IN:    Glucerna 1.5: 960 mL    Oral Fluid: 170 mL  Total IN: 1130 mL    OUT:    Indwelling Catheter - Urethral (mL): 1805 mL  Total OUT: 1805 mL    Total NET: -675 mL      15 Sep 2020 07:01  -  15 Sep 2020 08:44  --------------------------------------------------------  IN:    Glucerna 1.5: 40 mL    Oral Fluid: 30 mL  Total IN: 70 mL    OUT:    Indwelling Catheter - Urethral (mL): 50 mL  Total OUT: 50 mL    Total NET: 20 mL        ============================ LABS =========================                        7.9    8.52  )-----------( 94       ( 15 Sep 2020 00:17 )             26.5     09-15    135  |  94<L>  |  74<H>  ----------------------------<  107<H>  3.8   |  31  |  1.32<H>    Ca    8.1<L>      15 Sep 2020 00:17  Phos  4.2     09-15  Mg     2.3     09-15    TPro  6.1  /  Alb  2.8<L>  /  TBili  0.6  /  DBili  x   /  AST  57<H>  /  ALT  47<H>  /  AlkPhos  174<H>  09-15    LIVER FUNCTIONS - ( 15 Sep 2020 00:17 )  Alb: 2.8 g/dL / Pro: 6.1 g/dL / ALK PHOS: 174 U/L / ALT: 47 U/L / AST: 57 U/L / GGT: x             ABG - ( 15 Sep 2020 05:14 )  pH, Arterial: 7.44  pH, Blood: x     /  pCO2: 50    /  pO2: 89    / HCO3: 34    / Base Excess: 8.9   /  SaO2: 98                  ======================Micro/Rad/Cardio=================  Culture: Reviewed   CXR: Reviewed  Echo:Reviewed  ======================================================  PAST MEDICAL & SURGICAL HISTORY:  H/O hemolytic anemia    H/O autoimmune hemolytic anemia    Knee pain, right    HLD (hyperlipidemia)    Former smoker    DVT of upper extremity (deep vein thrombosis)    Hepatitis C virus    GIB (gastrointestinal bleeding)    Ventricular fibrillation  s/p AICD    PAF (paroxysmal atrial fibrillation)  on xarelto    Non-Ischemic Cardiomyopathy    SVT (Supraventricular Tachycardia)    HTN    CHF (Congestive Heart Failure)    S/P right heart catheterization  biopsy multiple    H/O heart transplant  2/2018    Status post left hip replacement    History of Prior Ablation Treatment  for afib    AICD (Automatic Cardioverter/Defibrillator) Present  [employer] with 1 [employer] [place] lead4/1/09- explanted and replaced with Medtronic 2 leads on 9/2/09      ====================ASSESSMENT ==============  Heart transplant 2/2018 for ACC/AHA stage D NICM   Resolved GI bleed, Melena s/p EGD  Supraventricular tachycardia  Severe hypertension  Hyperlactatemia acidosis, resolved  Acute respiratory failure, resolved  Leukopenia- resolved  Acute blood loss anemia, stable   Abdominal distention  ileus  CKD Stage III  C. diff diarrhea  C. diff colitis  Klebsiella oxytoca bacteremia  Sepsis  Azotemia 2/2 CKD and Steroids   Pancolitis  Macrocytic hemolytic anemia with (+) IGg cornel     Plan:  ====================== NEUROLOGY=====================  Nonfocal, continue to monitor neuro status   Continue physical therapy as tolerated     ==================== RESPIRATORY======================  Persistent RLL Collapse on CXR requiring bronchoscopy on 9/08 with elevated hemidiaphragm.  Comfortable on room air, SpO2 %   Encourage incentive spirometry, continue pulse ox monitoring, follow ABGs. Encourage chest PT.      ====================CARDIOVASCULAR==================  S/P Heart transplant 2/2018 for ACC/AHA stage D NICM, Transplant team following.   Tacrolimus restarted, off Cellcept while on high dose steroids.   ASA on hold due to hx of GI bleed / ulceration.  Continue Hydralazine 50mg PO q8h for a goal MAP of 70-80  Continue invasive hemodynamic monitoring via Arterial Line    hydrALAZINE 50 milliGRAM(s) Oral every 8 hours  predniSONE   Tablet 10 milliGRAM(s) Oral <User Schedule>    ===================HEMATOLOGIC/ONC ===================  Anemia S/P multiple blood transfusions.   Continue closely monitoring H&H and transfuse prn.   Hx of Autoimmune hemolytic anemia, patient with (+) Igg Cornel,  f/u am hemolysis labs. f/u G6PD     heparin   Injectable 5000 Unit(s) SubCutaneous every 8 hours for VTE prophylaxis    ===================== RENAL =========================  Anasarca, NORMAN on CKD stage III   Continue monitoring urine output, I&OS, BUN/Cr   Diuresis with Lasix     furosemide   Injectable 40 milliGRAM(s) IV Push every 8 hours    ==================== GASTROINTESTINAL===================  Patient with ileus in setting of C.Diff- NGT clamped 9/4 for 4 hrs, no residual.  Monitor BMs and flatus. Now tolerating tube feeds, Glucerna 1.5 Sohail @ goal rate 40 cc/hr  CT A/P on 8/30 with Pancolitis and small volume abdominopelvic ascites, not significantly changed since 8/20/2020.  General surgery following - continue serial abdominal exams. No surgical intervention at this time.     dextrose 5%. 1000 milliLiter(s) (50 mL/Hr) IV Continuous <Continuous>  pantoprazole  Injectable 40 milliGRAM(s) IV Push daily for GI prophylaxis   sodium chloride 0.9% lock flush 10 milliLiter(s) IV Push every 1 hour PRN Pre/post blood products, medications, blood draw, and to maintain line patency    =======================    ENDOCRINE  =====================  Glucose control with humalog sliding scale, NPH human recombinant, and glucagon prn     dextrose 40% Gel 15 Gram(s) Oral once PRN Blood Glucose LESS THAN 70 milliGRAM(s)/deciliter  dextrose 50% Injectable 12.5 Gram(s) IV Push once  dextrose 50% Injectable 25 Gram(s) IV Push once  glucagon  Injectable 1 milliGRAM(s) IntraMuscular once PRN Glucose LESS THAN 70 milligrams/deciliter  insulin lispro (HumaLOG) corrective regimen sliding scale   SubCutaneous every 6 hours  insulin lispro Injectable (HumaLOG) 3 Unit(s) SubCutaneous three times a day before meals  insulin NPH human recombinant 5 Unit(s) SubCutaneous every 12 hours    ========================INFECTIOUS DISEASE================  BCx 8/13: (+)Klebsiella bacteremia, has since been cleared.  Clostridium difficile PCR 8/23: (+), continue with vancomycin PO  - S/P 5 days of IVIG (8/29-9/1)  - S/P Eravaycline (8/31- 9/6)  - S/P IV flagyl discontinued (8/24-9/1)  - S/P Rectal Vanco (8/23-9/11)  Transplant prophylaxis with Posaconazole and Atovaquone on hold due to ileus    vancomycin    Solution 500 milliGRAM(s) Oral every 6 hours      Patient requires continuous monitoring with bedside rhythm monitoring, pulse ox monitoring, and intermittent blood gas analysis. Care plan discussed with ICU care team. Patient remained critical and at risk for life threatening decompensation.     By signing my name below, I, Ronit Joe, attest that this documentation has been prepared under the direction and in the presence of Colette Mccabe NP   Electronically signed: Katty Gaffney, 09-15-20 @ 08:44    I, Colette Mccabe, personally performed the services described in this documentation. all medical record entries made by the ramuibkurt were at my direction and in my presence. 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 Patient was treated w/ prednisone and Rituximab. Tacro was discontinued and patient was also transitioned to everolimus  Admitted  6/16-6/19  for hyperglycemia.  Reported to transplant team having one done black tarry stool-  instructed to  present to [facility] for hemolytic anemia. Patient has been following with Hematology outpatient.  Denies CP  N/V diarrhea SOB. (11 Aug 2020 20:08)    Surgery/Hospital Course:  8/11 Admitted to [facility] with symptomatic anemia  8/13 EGD for investigation of tarry stools  8/15 SB capsule: stomach & SB lesions, likely ulcers.  8/17 EGD/push enteroscopy w/ angioectasias/erosions in small bowel. Gastropathy and esophagitis. Ulcer seen on VCE most likely scope trauma.    8/18 VSS transferred back to floor.   8/20 VS 9.0/28.4 stable Gastric biopsy results LA Grade A esophagitis.  - Acute gastritis. Biopsies taken to r/o CMV, No ulceration seen despite finding on capsule endoscopy - likely 2/2 scope  trauma that has since resolved. Pathology pending.  8/21 VSS H/H  8.1/25.7  trending down will monitor prednisone taper 30 mg today. Procardia 120 initiated today RHC biopsy Monday.   8/22 VVS; Patient reports loose stools x 2-3 days with 1 episode today.  Stool sent for C-dif and GI PCR. Abdominal X-ray revealed: dilated loops of bowel. Abdomen distended.  Patient denies N/V. GI called to re-evaluate. Continue with current medication regimen.  Awaiting for upcoming cardiac biopsy on Monday 8/24.  8/23   vss    creat up to 2.28 ,  repeating CMP,  TTE ordered  Bladder scan   8/24   cardiac bx cancelled   elev creat  to 2.74   cardio renal cslt called,    + CDIF   inc vanco to 500 q6   ID following  8/25 VSS: RHC today as per transplant team; transfuse 2 units PRBC today as per Dr. Viramontes; continue vanco for cdiff; transfer patient to CTU after cath today   8/26. Dieretic challenge with good response   8/27: Downgraded to the floor then upgraded to the CTU again for concerning of abd distention   8/28 CT ABD & Pelvis reveals pancolitis  8/30: repeat CT scan of abd, gen surgery called to re-evaluated pt.  8/31: RUE duplex negative for DVT  9/4 NGT clamped, minimal residual. NGT removed. started sips   9/6 tolerating clear liquid diet  9/8 Bronch  9/9 1 prbc   9/10 increased tube feeds to 20cc/hr and tpn decreased to 63cc/hr from 125cc/hr   9/12 TPN discontinued  9/14 Diet advanced, tube feeds at 75% goal, central line removed and midline inserted.  9/14 pt completed approp 75 % of meal.   pt ambulated to the door this afternoon  9/15 Pt tolerating PO diet with supplemental tube feeds. calorie count iniated    Today/Overnight:   ·	Pt hemodynamically stable. Tolerating advancement of diet to PO. Pt had 4 loose BM overnight.    REVIEW OF SYSTEMS:  Gen: No fever  EYES/ENT: No visual changes;  No vertigo or throat pain   NECK: No pain   RES:  No shortness of breath or Cough  CARD: No chest pain   GI: No abdominal pain  : No dysuria  NEURO: No weakness  SKIN: No itching, rashes     ICU Vital Signs Last 24 Hrs  T(C): 36.5 (15 Sep 2020 08:00), Max: 36.8 (15 Sep 2020 03:00)  T(F): 97.7 (15 Sep 2020 08:00), Max: 98.2 (15 Sep 2020 03:00)  HR: 113 (15 Sep 2020 08:00) (112 - 121)  BP: --  BP(mean): --  ABP: 101/58 (15 Sep 2020 08:00) (90/49 - 121/62)  ABP(mean): 73 (15 Sep 2020 08:00) (63 - 80)  RR: 25 (15 Sep 2020 08:00) (12 - 43)  SpO2: 100% (15 Sep 2020 08:00) (93% - 100%)      Physical Exam:  Gen:  Awake, alert   CNS: non focal 	  Neck: no JVD  RES : Diminished breath sounds B/L , crackles at bases, no wheezing                    CVS: Sinus tachycardia. Normal S1/S2  Abd: Soft, distended. Bowel sounds present.  Skin: No rash.  Ext: B/L upper extremity edema R>L, +1 edema b/l LE.  Lines: Right Radial [place] 9/8, RUE midline 9/14    ============================I/O===========================   I&O's Detail    14 Sep 2020 07:01  -  15 Sep 2020 07:00  --------------------------------------------------------  IN:    Glucerna 1.5: 960 mL    Oral Fluid: 170 mL  Total IN: 1130 mL    OUT:    Indwelling Catheter - Urethral (mL): 1805 mL  Total OUT: 1805 mL    Total NET: -675 mL      15 Sep 2020 07:01  -  15 Sep 2020 08:44  --------------------------------------------------------  IN:    Glucerna 1.5: 40 mL    Oral Fluid: 30 mL  Total IN: 70 mL    OUT:    Indwelling Catheter - Urethral (mL): 50 mL  Total OUT: 50 mL    Total NET: 20 mL        ============================ LABS =========================                        7.9    8.52  )-----------( 94       ( 15 Sep 2020 00:17 )             26.5     09-15    135  |  94<L>  |  74<H>  ----------------------------<  107<H>  3.8   |  31  |  1.32<H>    Ca    8.1<L>      15 Sep 2020 00:17  Phos  4.2     09-15  Mg     2.3     09-15    TPro  6.1  /  Alb  2.8<L>  /  TBili  0.6  /  DBili  x   /  AST  57<H>  /  ALT  47<H>  /  AlkPhos  174<H>  09-15    LIVER FUNCTIONS - ( 15 Sep 2020 00:17 )  Alb: 2.8 g/dL / Pro: 6.1 g/dL / ALK PHOS: 174 U/L / ALT: 47 U/L / AST: 57 U/L / GGT: x             ABG - ( 15 Sep 2020 05:14 )  pH, Arterial: 7.44  pH, Blood: x     /  pCO2: 50    /  pO2: 89    / HCO3: 34    / Base Excess: 8.9   /  SaO2: 98                  ======================Micro/Rad/Cardio=================  Culture: Reviewed   CXR: Reviewed  Echo:Reviewed  ======================================================  PAST MEDICAL & SURGICAL HISTORY:  H/O hemolytic anemia    H/O autoimmune hemolytic anemia    Knee pain, right    HLD (hyperlipidemia)    Former smoker    DVT of upper extremity (deep vein thrombosis)    Hepatitis C virus    GIB (gastrointestinal bleeding)    Ventricular fibrillation  s/p AICD    PAF (paroxysmal atrial fibrillation)  on xarelto    Non-Ischemic Cardiomyopathy    SVT (Supraventricular Tachycardia)    HTN    CHF (Congestive Heart Failure)    S/P right heart catheterization  biopsy multiple    H/O heart transplant  2/2018    Status post left hip replacement    History of Prior Ablation Treatment  for afib    AICD (Automatic Cardioverter/Defibrillator) Present  [employer] with 1 [employer] [place] lead4/1/09- explanted and replaced with Medtronic 2 leads on 9/2/09      ====================ASSESSMENT ==============  Heart transplant 2/2018 for ACC/AHA stage D NICM   Resolved GI bleed, Melena s/p EGD  Supraventricular tachycardia  Severe hypertension  Hyperlactatemia acidosis, resolved  Acute respiratory failure, resolved  Leukopenia- resolved  Acute blood loss anemia, stable   Abdominal distention  ileus  CKD Stage III  C. diff diarrhea  C. diff colitis  Klebsiella oxytoca bacteremia  Sepsis  Azotemia 2/2 CKD and Steroids   Pancolitis  Macrocytic hemolytic anemia with (+) IGg cornel     Plan:  ====================== NEUROLOGY=====================  Nonfocal, continue to monitor neuro status   Continue physical therapy as tolerated     ==================== RESPIRATORY======================  Persistent RLL Collapse on CXR requiring bronchoscopy on 9/08 with elevated hemidiaphragm.  Comfortable on room air, SpO2 %   Encourage incentive spirometry, continue pulse ox monitoring. Encourage chest PT.      ====================CARDIOVASCULAR==================  S/P Heart transplant 2/2018 for ACC/AHA stage D NICM, Transplant team following.   Tacrolimus restarted, will goal of 8-10 being managed by heart failure, off Cellcept while on high dose steroids.   ASA on hold due to hx of GI bleed / ulceration.  Continue Hydralazine 50mg PO q8h for a goal MAP of 70-80  plan to d/c ananya    hydrALAZINE 50 milliGRAM(s) Oral every 8 hours  predniSONE   Tablet 10 milliGRAM(s) Oral <User Schedule>    ===================HEMATOLOGIC/ONC ===================  Anemia S/P multiple blood transfusions.   Continue closely monitoring H&H and transfuse prn.   Hx of Autoimmune hemolytic anemia, patient with (+) Igg Cornel,  f/u am hemolysis labs. f/u G6PD     heparin   Injectable 5000 Unit(s) SubCutaneous every 8 hours for VTE prophylaxis    ===================== RENAL =========================  Anasarca, NORMAN on CKD stage III   Continue monitoring urine output, I&OS, BUN/Cr   Diuresis with Lasix   decrease lasix from q8 to q12    furosemide   Injectable 40 milliGRAM(s) IV Push every 12 hours    ==================== GASTROINTESTINAL===================  Patient with ileus in setting of C.Diff- NGT clamped 9/4 for 4 hrs, no residual.  Monitor BMs and flatus. Now tolerating tube feeds, Glucerna 1.5 Sohail @ goal rate 40 cc/hr  CT A/P on 8/30 with Pancolitis and small volume abdominopelvic ascites, not significantly changed since 8/20/2020.  General surgery following - continue serial abdominal exams. No surgical intervention at this time.   Diet advanced from full liquid to CC/ low fiber diet  loose BM x4 overnight, + c. diff, on PO vanco    pantoprazole  Injectable 40 milliGRAM(s) IV Push daily for GI prophylaxis     =======================    ENDOCRINE  =====================  Glucose control with humalog sliding scale, NPH human recombinant  Endocrine consulted, was following pt outpatient    insulin lispro (HumaLOG) corrective regimen sliding scale   SubCutaneous every 6 hours  insulin lispro Injectable (HumaLOG) 3 Unit(s) SubCutaneous three times a day before meals  insulin NPH human recombinant 5 Unit(s) SubCutaneous every 12 hours    ========================INFECTIOUS DISEASE================  BCx 8/13: (+)Klebsiella bacteremia, has since been cleared.  Clostridium difficile PCR 8/23: (+), continue with vancomycin PO  - S/P 5 days of IVIG (8/29-9/1)  - S/P Eravaycline (8/31- 9/6)  - S/P IV flagyl discontinued (8/24-9/1)  - S/P Rectal Vanco (8/23-9/11)  Transplant prophylaxis with Posaconazole and Atovaquone on hold due to ileus    vancomycin    Solution 500 milliGRAM(s) Oral every 6 hours      Patient requires continuous monitoring with bedside rhythm monitoring, pulse ox monitoring. Care plan discussed with ICU care team. Patient stable for transfer to SDU.     By signing my name below, I, Ronit Joe, attest that this documentation has been prepared under the direction and in the presence of Colette Mccabe NP   Electronically signed: Katty Gaffney, 09-15-20 @ 08:44    I, Colette Mccabe, personally performed the services described in this documentation. all medical record entries made by the ramuibkurt were at my direction and in my presence. I have reviewed the chart and agree that the record reflects my personal performance and is accurate and complete  Electronically signed: Colette Mccabe NP

## 2020-09-15 NOTE — PROGRESS NOTE ADULT - PROBLEM SELECTOR PLAN 2
- Continue tacrolimus, goal will be ~10 as monotherapy (previously on everolimus). Decreasing dose given rapid rise in levels and recent c diff  - Continue prednisone 10 mg daily, will not wean until confirming absence of hemolysis on tacrolimus   - Will need to resume statin and aspirin when stable.

## 2020-09-15 NOTE — PROGRESS NOTE ADULT - ASSESSMENT
A/P:  70y M with history of heart transplant in 2/2018 admitted with autoimmune hemolytic anemia and dark stools requiring transfusion. Found to have c diff and abdominal distension on 8/23.  Pt developed Ileus w/ NGT placement and was made NPO.  Ileus resolving and TF & diet slowly being reintroduced.    TPN team was consulted to assist w/ management in pt who was NPO for a prolonged period of time.  Pt  now on a hybrid of TF & oral nutrition  Pt w/ Acute Severe Protein-Calorie Malnutrition  TPN stopped 9/12/2020.  Pt tolerating LRD /ADA & TF/ Glucerna          - HyperTg- improving w/ TPN stopped, continue to monitor   - Hyperglycemia- NPH 5U q12h & Humolog 3U qmeal              FS w/ ISS as per CTU  - Strict Intake and Output- fluid overload improving             Lasix 40mg q8h             [place] resolving  - HypoMg & HypoPhos- improving off tpn             Maintaining Mg levels will assist GI motility  - Elevated Alk Phos & Lfts- improving -continue to monitor  - Weights as per protocol  - Monitor  CMP, Mg, iCa, & Phos daily  - Check Prealbumin check weekly  - PICC w/ designated port for TPN, maintenance as per protocol  - Continue monitoring as per CTU w/ Surgery, will follow with you, d/w CTU team    SHELLY Stafford-C  [phone]) [phone]  d/w Ayan Hansen & Marciano

## 2020-09-15 NOTE — PROGRESS NOTE ADULT - SUBJECTIVE AND OBJECTIVE BOX
Behavioral Cardiology Psychological Assessment     History of present illness: Mr. Baez is a 70 year old man with history of dilated nonischemic cardiomyopathy, s/p OHT 2018, CKD III, HCV s/p treatment, and recently diagnosed autoimmune hemolytic anemia who is admitted with symptomatic anemia with Hgb of 6.6. Found to have chronic gastritis and small bowel ectasias. Has worsening abdominal distention with finding of ileus. CT scan with pancolitis. Started on IVIG , rectal vanc and Eravacylcine on .    Social history: , lives in a private house he owns in [place]. His younger brother (Rivas Davey) lives with him. Pt’s wife   and his only son was killed 5 years ago. He has an 17 y/o granddaughter that lives nearby with her mother and just started college this week. Reports a close relationship with his siblings (6 brothers and 1 sister) all but one brother live in [place]. Identified his adopted "godbrother" (Nawaf Barahona [phone]) as primary support person and HCP. Identified a close friend (Tahira) who lives close by as additional support and alternate HCP; she is very involved in his care. His brother (Rivas Davey age 45) lives with him. Mother  (age 79); father . He has 2 daughters from a previous relationship but is not part of their lives. Worked as a  at [employer] for 40+ years; retired 2 years ago. On disability. Information obtained from patient and chart. Education: 10th Grade (limited literacy skills).     Substance use:   Tobacco: Former, quit 50 years ago, smoked 2 cigarettes/day for 2-3 years.   Alcohol: Former alcohol use; stopped 6 yrs ago; drank 4-5 drinks of rum 5 days/week for 40 yrs.   Drug: Past occasional marijuana use for many years ago; no other nonprescription drug use.     Past psychiatric history: In past experienced depression related to bereavement issues following wife’s death () and son’s death (). Never sought treatment. Denies any other psychiatric issues. No history of s/a. No inpatient psychiatric admissions.     Psychological assessment: Mood improved. Less anxious. Feels he is making some progress. Happy with news he is going to floor once bed available. Some concern about his overall health and recovery. Sleeping well at night with naps during day. Appetite good. Receptive to support and validation. Denies symptoms of anxiety/depression.     Mental status exam: Seen lying in bed in CTU. Cooperative, well related with good eye contact. Awake, oriented x2 (unable to identify month, date). Speech normal rate/volume. Thought process goal directed. No evidence of any psychosis, jona, delusions. Mood stressed. Affect anxious. No s/i. Insight and judgment adequate.

## 2020-09-15 NOTE — CHART NOTE - NSCHARTNOTEFT_GEN_A_CORE
Pt sitting up on side of bed eating lunch. Pt denies pain and needs at this time.  Call bell is within reach Source: Patient [ X]    Family [ ]     other [ X] RN, Medical record,     Pt seen for nutritional follow up. Pt seen, reports feeling well with a good appetite but c/o persistent diarrhea (Pt C-Diff +). Pt requesting Nepro x1 daily and aung food cake; will review with team. Pt was amenable to diet education and food safety education review. Pt verbalized understanding and plans to share education with HAA. Pt denies additional food preferences at this time and denies nutritional concerns.     Chart reviewed, events noted. Pt admitted for Fevers, Pt with PMHx of Heart Transplant. Per chart Pt with SIRS, PNA and pseudomonas. Pt now C-Diff+    Diet : Consistent CHO       Patient reports diarrhea      PO intake:  %      Source for PO intake [ ] Patient [ ] family [ ] chart [ ] staff [ ] other     Enteral /Parenteral Nutrition:       Current Weight: Weight (kg): 72.6kg Decrease noted. Continue to trend   Admission Wt: 78.075 (08-14 @ 14:42)  % Weight Change    Pertinent Medications: MEDICATIONS  (STANDING):  aspirin enteric coated 81 milliGRAM(s) Oral daily  atovaquone Suspension 1500 milliGRAM(s) Oral daily  Calcium Citrate + Vit D 315mg/250 IU 2 Tablet(s) 2 Tablet(s) Oral <User Schedule>  cefepime   IVPB      cefepime   IVPB 1000 milliGRAM(s) IV Intermittent every 8 hours  famotidine    Tablet 20 milliGRAM(s) Oral daily  insulin lispro (HumaLOG) corrective regimen sliding scale   SubCutaneous three times a day before meals  insulin lispro (HumaLOG) corrective regimen sliding scale   SubCutaneous at bedtime  magnesium oxide 400 milliGRAM(s) Oral two times a day with meals  metroNIDAZOLE  IVPB      metroNIDAZOLE  IVPB 500 milliGRAM(s) IV Intermittent every 8 hours  multivitamin 1 Tablet(s) Oral daily  pravastatin 20 milliGRAM(s) Oral at bedtime  predniSONE   Tablet 3 milliGRAM(s) Oral <User Schedule>  sodium bicarbonate 650 milliGRAM(s) Oral two times a day  sodium chloride 0.65% Nasal 1 Spray(s) Both Nostrils three times a day  tacrolimus 0.5 milliGRAM(s) Oral <User Schedule>  tacrolimus 1 milliGRAM(s) Oral <User Schedule>  vancomycin    Solution 125 milliGRAM(s) Oral every 6 hours  vancomycin  IVPB 750 milliGRAM(s) IV Intermittent every 12 hours  voriconazole 200 milliGRAM(s) Oral every 12 hours    MEDICATIONS  (PRN):  acetaminophen    Suspension 500 milliGRAM(s) Oral every 6 hours PRN For Temp greater than 38.5 C (101.3 F)  docusate sodium 100 milliGRAM(s) Oral two times a day PRN Constipation    Pertinent Labs:  Hgb/Hct:7.9/25.0-low, Na:134-low, K:4.5, Cl:104, BUN:23, Cr:.0.99, Glucose:134-high, Ca:8.8, BG levels: 8/20: , 8/21: 133-high    Skin: intact, +1 generalized edema     Estimated Needs:   [ ] no change since previous assessment  [ ] recalculated:       Previous Nutrition Diagnosis:      [X ]Inadequate Oral Intake       Nutrition Diagnosis is [ X] ongoing  [ ] resolved [ ] not applicable          New Nutrition Diagnosis: [X ] not applicable       Interventions:     Recommend    1. Provide food preferences as requested by Pt/family within diet restrictions  2. Encourage PO intake during meal times  3. Reviewed menu ordering procedures   4. Add Nepro x1 daily to supplement intake   5. Education reinforced   6. Trend BG levels, renal indices, LFT's and electrolytes        Monitoring and Evaluation:     [X ] PO intake [X ] Tolerance to diet prescription [X ] weights [X ] follow up per protocol    [X ] other: RD remains available Sarah Siegler RD, [facility] Pager #[phone] Source: Patient [ X]    Family [ ]     other [ X] RN, Medical record,     Pt seen for nutritional follow up. Pt seen, reports feeling well with a good appetite but c/o persistent diarrhea (Pt C-Diff +). Pt requesting Nepro x1 daily and aung food cake; will review with team. Pt was amenable to diet education and food safety education review. Pt verbalized understanding and plans to share education with HAA. Pt denies additional food preferences at this time and denies nutritional concerns.     Chart reviewed, events noted. Pt admitted for Fevers, Pt with PMHx of Heart Transplant. Per chart Pt with SIRS, PNA and pseudomonas. S/p lung Bx, awaiting results/; Pt now C-Diff+    Diet : Consistent CHO       Patient reports diarrhea      PO intake:  %      Source for PO intake [ ] Patient [ ] family [ ] chart [ ] staff [ ] other     Enteral /Parenteral Nutrition:       Current Weight: Weight (kg): 72.6kg Decrease noted. Continue to trend   Admission Wt: 78.075 (08-14 @ 14:42)  % Weight Change    Pertinent Medications: MEDICATIONS  (STANDING):  aspirin enteric coated 81 milliGRAM(s) Oral daily  atovaquone Suspension 1500 milliGRAM(s) Oral daily  Calcium Citrate + Vit D 315mg/250 IU 2 Tablet(s) 2 Tablet(s) Oral <User Schedule>  cefepime   IVPB      cefepime   IVPB 1000 milliGRAM(s) IV Intermittent every 8 hours  famotidine    Tablet 20 milliGRAM(s) Oral daily  insulin lispro (HumaLOG) corrective regimen sliding scale   SubCutaneous three times a day before meals  insulin lispro (HumaLOG) corrective regimen sliding scale   SubCutaneous at bedtime  magnesium oxide 400 milliGRAM(s) Oral two times a day with meals  metroNIDAZOLE  IVPB      metroNIDAZOLE  IVPB 500 milliGRAM(s) IV Intermittent every 8 hours  multivitamin 1 Tablet(s) Oral daily  pravastatin 20 milliGRAM(s) Oral at bedtime  predniSONE   Tablet 3 milliGRAM(s) Oral <User Schedule>  sodium bicarbonate 650 milliGRAM(s) Oral two times a day  sodium chloride 0.65% Nasal 1 Spray(s) Both Nostrils three times a day  tacrolimus 0.5 milliGRAM(s) Oral <User Schedule>  tacrolimus 1 milliGRAM(s) Oral <User Schedule>  vancomycin    Solution 125 milliGRAM(s) Oral every 6 hours  vancomycin  IVPB 750 milliGRAM(s) IV Intermittent every 12 hours  voriconazole 200 milliGRAM(s) Oral every 12 hours    MEDICATIONS  (PRN):  acetaminophen    Suspension 500 milliGRAM(s) Oral every 6 hours PRN For Temp greater than 38.5 C (101.3 F)  docusate sodium 100 milliGRAM(s) Oral two times a day PRN Constipation    Pertinent Labs:  Hgb/Hct:7.9/25.0-low, Na:134-low, K:4.5, Cl:104, BUN:23, Cr:.0.99, Glucose:134-high, Ca:8.8, BG levels: 8/20: , 8/21: 133-high    Skin: intact, +1 generalized edema     Estimated Needs:   [ ] no change since previous assessment  [ ] recalculated:       Previous Nutrition Diagnosis:      [X ]Inadequate Oral Intake       Nutrition Diagnosis is [ X] ongoing  [ ] resolved [ ] not applicable          New Nutrition Diagnosis: [X ] not applicable       Interventions:     Recommend    1. Provide food preferences as requested by Pt/family within diet restrictions  2. Encourage PO intake during meal times  3. Reviewed menu ordering procedures   4. Add Nepro x1 daily to supplement intake   5. Education reinforced   6. Trend BG levels, renal indices, LFT's and electrolytes        Monitoring and Evaluation:     [X ] PO intake [X ] Tolerance to diet prescription [X ] weights [X ] follow up per protocol    [X ] other: RD remains available Sarah Siegler RD, [facility] Pager #[phone]

## 2020-09-15 NOTE — PROGRESS NOTE ADULT - ASSESSMENT
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 s/p EGD with esophagitis and gastritis. Developed Klebsiella oxytoca bacteremia requiring transfer to CTU. Clinically improving, transferred to [facility], finished 10-day of ceftriaxone, however, developed severe C.diff colitis on Vancomycin 500mg q6h PO and IV Flagyl. He had RHC on 8/25 and found to have high filling pressure so transferred to CTU again.    #C.diff colitis with NORMAN- C.diff PCR detected (8/23). Repeat CT on 8/30 without evidence of toxic megacolon.   - clinically  has hit a plateau and without significant improvement in the past few days  tolerating minimal amounts of oral food  continue po vanco  Can restart IV flagyl if change in condition  Would repeat imaging of the abdomen with X-ray and possibly CT scan of abdomen  Monitor clinically for signs of worsening       #OI prophylaxis-  -Was previously receiving high dose steroids  -On IV cyclosporine now  -CMV viral load(8/17, 8/26): neg  -Valcyte and Bactrim d/c'ed on 8/17 due to leukopenia  -Galactomann<0.5, Fungitell<31  -Would repeat CMV viral load this week  -Will consider bactrim or Atovaquone for PCP ppx when able to tolerated PO    Gary Lujan MD  [phone]  After 5pm/weekends [phone]

## 2020-09-16 DIAGNOSIS — Z86.2 PERSONAL HISTORY OF DISEASES OF THE BLOOD AND BLOOD-FORMING ORGANS AND CERTAIN DISORDERS INVOLVING THE IMMUNE MECHANISM: ICD-10-CM

## 2020-09-16 LAB
ALBUMIN SERPL ELPH-MCNC: 2.7 G/DL — LOW (ref 3.3–5)
ALP SERPL-CCNC: 176 U/L — HIGH (ref 40–120)
ALT FLD-CCNC: 48 U/L — HIGH (ref 10–45)
ANION GAP SERPL CALC-SCNC: 9 MMOL/L — SIGNIFICANT CHANGE UP (ref 5–17)
AST SERPL-CCNC: 54 U/L — HIGH (ref 10–40)
BILIRUB SERPL-MCNC: 0.5 MG/DL — SIGNIFICANT CHANGE UP (ref 0.2–1.2)
BUN SERPL-MCNC: 71 MG/DL — HIGH (ref 7–23)
CALCIUM SERPL-MCNC: 8.2 MG/DL — LOW (ref 8.4–10.5)
CHLORIDE SERPL-SCNC: 97 MMOL/L — SIGNIFICANT CHANGE UP (ref 96–108)
CO2 SERPL-SCNC: 31 MMOL/L — SIGNIFICANT CHANGE UP (ref 22–31)
CREAT SERPL-MCNC: 1.3 MG/DL — SIGNIFICANT CHANGE UP (ref 0.5–1.3)
GLUCOSE BLDC GLUCOMTR-MCNC: 127 MG/DL — HIGH (ref 70–99)
GLUCOSE BLDC GLUCOMTR-MCNC: 127 MG/DL — HIGH (ref 70–99)
GLUCOSE BLDC GLUCOMTR-MCNC: 130 MG/DL — HIGH (ref 70–99)
GLUCOSE BLDC GLUCOMTR-MCNC: 133 MG/DL — HIGH (ref 70–99)
GLUCOSE BLDC GLUCOMTR-MCNC: 135 MG/DL — HIGH (ref 70–99)
GLUCOSE BLDC GLUCOMTR-MCNC: 137 MG/DL — HIGH (ref 70–99)
GLUCOSE BLDC GLUCOMTR-MCNC: 152 MG/DL — HIGH (ref 70–99)
GLUCOSE SERPL-MCNC: 121 MG/DL — HIGH (ref 70–99)
HCT VFR BLD CALC: 25.8 % — LOW (ref 39–50)
HGB BLD-MCNC: 7.7 G/DL — LOW (ref 13–17)
MAGNESIUM SERPL-MCNC: 2.4 MG/DL — SIGNIFICANT CHANGE UP (ref 1.6–2.6)
MCHC RBC-ENTMCNC: 29.8 GM/DL — LOW (ref 32–36)
MCHC RBC-ENTMCNC: 32 PG — SIGNIFICANT CHANGE UP (ref 27–34)
MCV RBC AUTO: 107.1 FL — HIGH (ref 80–100)
NRBC # BLD: 4 /100 WBCS — HIGH (ref 0–0)
PHOSPHATE SERPL-MCNC: 3 MG/DL — SIGNIFICANT CHANGE UP (ref 2.5–4.5)
PLATELET # BLD AUTO: 81 K/UL — LOW (ref 150–400)
POTASSIUM SERPL-MCNC: 4.3 MMOL/L — SIGNIFICANT CHANGE UP (ref 3.5–5.3)
POTASSIUM SERPL-SCNC: 4.3 MMOL/L — SIGNIFICANT CHANGE UP (ref 3.5–5.3)
PROT SERPL-MCNC: 5.9 G/DL — LOW (ref 6–8.3)
RBC # BLD: 2.41 M/UL — LOW (ref 4.2–5.8)
RBC # FLD: 26.4 % — HIGH (ref 10.3–14.5)
SODIUM SERPL-SCNC: 137 MMOL/L — SIGNIFICANT CHANGE UP (ref 135–145)
TACROLIMUS SERPL-MCNC: 7.2 NG/ML — SIGNIFICANT CHANGE UP
WBC # BLD: 8.04 K/UL — SIGNIFICANT CHANGE UP (ref 3.8–10.5)
WBC # FLD AUTO: 8.04 K/UL — SIGNIFICANT CHANGE UP (ref 3.8–10.5)

## 2020-09-16 PROCEDURE — ZZZZZ: CPT

## 2020-09-16 PROCEDURE — 99233 SBSQ HOSP IP/OBS HIGH 50: CPT

## 2020-09-16 PROCEDURE — 71045 X-RAY EXAM CHEST 1 VIEW: CPT | Mod: 26

## 2020-09-16 PROCEDURE — 99232 SBSQ HOSP IP/OBS MODERATE 35: CPT

## 2020-09-16 RX ORDER — HYDRALAZINE HCL 50 MG
25 TABLET ORAL EVERY 8 HOURS
Refills: 0 | Status: DISCONTINUED | OUTPATIENT
Start: 2020-09-16 | End: 2020-09-29

## 2020-09-16 RX ORDER — HYDRALAZINE HCL 50 MG
25 TABLET ORAL EVERY 8 HOURS
Refills: 0 | Status: DISCONTINUED | OUTPATIENT
Start: 2020-09-16 | End: 2020-09-16

## 2020-09-16 RX ORDER — DRONABINOL 2.5 MG
5 CAPSULE ORAL
Refills: 0 | Status: DISCONTINUED | OUTPATIENT
Start: 2020-09-17 | End: 2020-09-23

## 2020-09-16 RX ORDER — SODIUM CHLORIDE 9 MG/ML
3 INJECTION INTRAMUSCULAR; INTRAVENOUS; SUBCUTANEOUS EVERY 8 HOURS
Refills: 0 | Status: DISCONTINUED | OUTPATIENT
Start: 2020-09-16 | End: 2020-10-02

## 2020-09-16 RX ADMIN — HEPARIN SODIUM 5000 UNIT(S): 5000 INJECTION INTRAVENOUS; SUBCUTANEOUS at 05:26

## 2020-09-16 RX ADMIN — Medication 500 MILLIGRAM(S): at 14:31

## 2020-09-16 RX ADMIN — Medication 500 MILLIGRAM(S): at 21:44

## 2020-09-16 RX ADMIN — HEPARIN SODIUM 5000 UNIT(S): 5000 INJECTION INTRAVENOUS; SUBCUTANEOUS at 21:44

## 2020-09-16 RX ADMIN — TACROLIMUS 4 MILLIGRAM(S): 5 CAPSULE ORAL at 08:39

## 2020-09-16 RX ADMIN — SODIUM CHLORIDE 3 MILLILITER(S): 9 INJECTION INTRAMUSCULAR; INTRAVENOUS; SUBCUTANEOUS at 14:21

## 2020-09-16 RX ADMIN — Medication 25 MILLIGRAM(S): at 14:34

## 2020-09-16 RX ADMIN — HEPARIN SODIUM 5000 UNIT(S): 5000 INJECTION INTRAVENOUS; SUBCUTANEOUS at 14:39

## 2020-09-16 RX ADMIN — TACROLIMUS 4 MILLIGRAM(S): 5 CAPSULE ORAL at 20:10

## 2020-09-16 RX ADMIN — Medication 500 MILLIGRAM(S): at 02:14

## 2020-09-16 RX ADMIN — SODIUM CHLORIDE 3 MILLILITER(S): 9 INJECTION INTRAMUSCULAR; INTRAVENOUS; SUBCUTANEOUS at 21:38

## 2020-09-16 RX ADMIN — PANTOPRAZOLE SODIUM 40 MILLIGRAM(S): 20 TABLET, DELAYED RELEASE ORAL at 05:26

## 2020-09-16 RX ADMIN — Medication 500 MILLIGRAM(S): at 08:41

## 2020-09-16 NOTE — PROGRESS NOTE ADULT - SUBJECTIVE AND OBJECTIVE BOX
[facility] NUTRITION SUPPORT / TPN -- FOLLOW UP NOTE  --------------------------------------------------------------------------------    24 hour events/subjective:    pt transferred to CT tele floor  calorie count noted  tolerating TF & LRD ADA diet  no abdominal pain  no bloating  No N/ V-   (+) flatus/ (+)BM   no f/c/s  no dyspnea/ sob/ palp/ cp    prog  Diet:  Diet, Consistent Carbohydrate/No Snacks:   Fiber/Residue Restricted (LOWFIBER)  Tube Feeding Modality: Nasogastric  Glucerna 1.5 Sohail (GLUCERNA1.5RTH)  Total Volume for 24 Hours (mL): 960  Continuous  Until Goal Tube Feed Rate (mL per Hour): 80  Tube Feed Duration (in Hours): 12  Tube Feed Start Time: 18:00  Supplement Feeding Modality:  Oral  Glucerna Shake Cans or Servings Per Day:  2       Frequency:  Daily (09-16-20 @ 07:37)      Appetite: [  ]Poor [ x ]Adequate [  ]Good  Caloric intake:  [ x  ]  Adequate   [   ] Inadequate    ROS: General/ GI see HPI  all other systems negative      ALLERGIES & MEDICATIONS  --------------------------------------------------------------------------------  No Known Allergies    STANDING INPATIENT MEDICATIONS    chlorhexidine 2% Cloths 1 Application(s) Topical <User Schedule>  dextrose 5%. 1000 milliLiter(s) IV Continuous <Continuous>  dextrose 50% Injectable 12.5 Gram(s) IV Push once  dextrose 50% Injectable 25 Gram(s) IV Push once  dextrose 50% Injectable 25 Gram(s) IV Push once  furosemide   Injectable 40 milliGRAM(s) IV Push every 12 hours  heparin   Injectable 5000 Unit(s) SubCutaneous every 8 hours  hydrALAZINE 25 milliGRAM(s) Oral every 8 hours  insulin lispro (HumaLOG) corrective regimen sliding scale   SubCutaneous every 6 hours  insulin lispro Injectable (HumaLOG) 3 Unit(s) SubCutaneous three times a day before meals  insulin NPH human recombinant 5 Unit(s) SubCutaneous every 12 hours  pantoprazole    Tablet 40 milliGRAM(s) Oral before breakfast  predniSONE   Tablet 10 milliGRAM(s) Oral <User Schedule>  sodium chloride 0.9% lock flush 3 milliLiter(s) IV Push every 8 hours  tacrolimus 4 milliGRAM(s) Oral <User Schedule>  vancomycin    Solution 500 milliGRAM(s) Oral every 6 hours      PRN INPATIENT MEDICATION  benzocaine 15 mG/menthol 3.6 mG (Sugar-Free) Lozenge 1 Lozenge Oral every 6 hours PRN  dextrose 40% Gel 15 Gram(s) Oral once PRN  glucagon  Injectable 1 milliGRAM(s) IntraMuscular once PRN  sodium chloride 0.9% lock flush 10 milliLiter(s) IV Push every 1 hour PRN        VITALS/PHYSICAL EXAM  --------------------------------------------------------------------------------  T(C): 36.7 (09-16-20 @ 13:46), Max: 36.9 (09-15-20 @ 20:00)  HR: 114 (09-16-20 @ 13:46) (114 - 130)  BP: 103/77 (09-16-20 @ 13:46) (80/60 - 104/73)  RR: 16 (09-16-20 @ 13:46) (14 - 48)  SpO2: 100% (09-16-20 @ 13:46) (92% - 100%)  Wt(kg): --        09-15-20 @ 07:01  -  09-16-20 @ 07:00  --------------------------------------------------------  IN: 1917 mL / OUT: 1480 mL / NET: 437 mL    09-16-20 @ 07:01  -  09-16-20 @ 14:21  --------------------------------------------------------  IN: 275 mL / OUT: 245 mL / NET: 30 mL      PHYSICAL EXAM:  GEN:  guarded but stable, WD/WN/WG, (+)KO TF  HEENT: NC/AT, PERRL, mucosa moist & throat clear, no trachea midline w/ neck supple  GI: (+) BS, softly distended, nontender   MSK:  FROM x4, no deformities  VASCULAR: Equally warm, no cyanosis, clubbing,        Lt IJ TLC w/ dressing c/d/i,         BLE mild edema equal,  R>LUE edema- soft nontender w/o cord or erythema  Neurology; no focal deficits, weakened strength & sensation grossly intact  Psych: normal affect  Skin: warm,  moist, & w/ good turgor      LABS/ CULTURES/ RADIOLOGY:              7.7    8.04  >-----------<  81       [09-16-20 @ 00:23]              25.8     137  |  97  |  71  ----------------------------<  121      [09-16-20 @ 00:23]  4.3   |  31  |  1.30        Ca     8.2     [09-16-20 @ 00:23]      Mg     2.4     [09-16-20 @ 00:23]      Phos  3.0     [09-16-20 @ 00:23]    TPro  5.9  /  Alb  2.7  /  TBili  0.5  /  DBili  x   /  AST  54  /  ALT  48  /  AlkPhos  176  [09-16-20 @ 00:23]      Blood Gas Arterial - Calcium, Ionized: 1.07 mmoL/L (09-15-20 @ 05:14)  Blood Gas Arterial - Calcium, Ionized: 1.10 mmoL/L (09-14-20 @ 23:58)      CAPILLARY BLOOD GLUCOSE  POCT Blood Glucose.: 152 mg/dL (16 Sep 2020 14:13)  POCT Blood Glucose.: 127 mg/dL (16 Sep 2020 11:10)  POCT Blood Glucose.: 135 mg/dL (16 Sep 2020 08:29)  POCT Blood Glucose.: 127 mg/dL (16 Sep 2020 05:29)  POCT Blood Glucose.: 133 mg/dL (16 Sep 2020 00:04)  POCT Blood Glucose.: 167 mg/dL (15 Sep 2020 17:35)    Prealbumin, Serum: 25 mg/dL (09-13-20 @ 05:27)  Prealbumin, Serum: 22 mg/dL (09-08-20 @ 16:22)  Prealbumin, Serum: 10 mg/dL (09-02-20 @ 08:12)  Prealbumin, Serum: 24 mg/dL (08-25-20 @ 15:10)      Triglycerides, Serum: 188 mg/dL (09-12-20 @ 00:39)  Triglycerides, Serum: 185 mg/dL (09-11-20 @ 00:31)  Triglycerides, Serum: 200 mg/dL (09-10-20 @ 00:41)  Triglycerides, Serum: 289 mg/dL (09-09-20 @ 00:29)  Triglycerides, Serum: 326 mg/dL (09-08-20 @ 11:05)

## 2020-09-16 NOTE — PROGRESS NOTE ADULT - SUBJECTIVE AND OBJECTIVE BOX
Subjective " hello when can I go back to bed"    VITAL SIGNS    Telemetry:      Vital Signs Last 24 Hrs  T(C): 36.4 (20 @ 10:00), Max: 36.9 (09-15-20 @ 20:00)  T(F): 97.5 (20 @ 10:00), Max: 98.5 (09-15-20 @ 20:00)  HR: 119 (20 @ 10:00) (119 - 130)  BP: 95/68 (20 @ 10:00) (80/60 - 104/73)  RR: 16 (20 @ 10:00) (14 - 48)  SpO2: 99% (20 @ 10:00) (92% - 100%)           09-15 @ 07:01  -   @ 07:00  --------------------------------------------------------  IN: 1917 mL / OUT: 1480 mL / NET: 437 mL     @ 07:01  -   @ 11:51  --------------------------------------------------------  IN: 75 mL / OUT: 45 mL / NET: 30 mL    Daily Weight in k.1 (16 Sep 2020 00:00)  Tacro level 7.2    CAPILLARY BLOOD GLUCOSE  127 (16 Sep 2020 06:00)  133 (16 Sep 2020 00:00)      POCT Blood Glucose.: 127 mg/dL (16 Sep 2020 11:10)  POCT Blood Glucose.: 135 mg/dL (16 Sep 2020 08:29)  POCT Blood Glucose.: 127 mg/dL (16 Sep 2020 05:29)  POCT Blood Glucose.: 133 mg/dL (16 Sep 2020 00:04)  POCT Blood Glucose.: 167 mg/dL (15 Sep 2020 17:35)        MEDICATIONS  (STANDING):  chlorhexidine 2% Cloths 1 Application(s) Topical <User Schedule>  furosemide   Injectable 40 milliGRAM(s) IV Push every 12 hours  heparin   Injectable 5000 Unit(s) SubCutaneous every 8 hours  hydrALAZINE 25 milliGRAM(s) Oral every 8 hours  insulin lispro (HumaLOG) corrective regimen sliding scale   SubCutaneous every 6 hours  insulin lispro Injectable (HumaLOG) 3 Unit(s) SubCutaneous three times a day before meals  insulin NPH human recombinant 5 Unit(s) SubCutaneous every 12 hours  pantoprazole    Tablet 40 milliGRAM(s) Oral before breakfast  predniSONE   Tablet 10 milliGRAM(s) Oral <User Schedule>  sodium chloride 0.9% lock flush 3 milliLiter(s) IV Push every 8 hours  tacrolimus 4 milliGRAM(s) Oral <User Schedule>  vancomycin    Solution 500 milliGRAM(s) Oral every 6 hours    MEDICATIONS  (PRN):  benzocaine 15 mG/menthol 3.6 mG (Sugar-Free) Lozenge 1 Lozenge Oral every 6 hours PRN Sore Throat  dextrose 40% Gel 15 Gram(s) Oral once PRN Blood Glucose LESS THAN 70 milliGRAM(s)/deciliter  glucagon  Injectable 1 milliGRAM(s) IntraMuscular once PRN Glucose LESS THAN 70 milligrams/deciliter  sodium chloride 0.9% lock flush 10 milliLiter(s) IV Push every 1 hour PRN Pre/post blood products, medications, blood draw, and to maintain line patency                          PHYSICAL EXAM        Neurology: alert and oriented x 3, nonfocal, no gross deficits    CV :S1 S2 RRR    Sternal Wound : Healed sternum Stable    Lungs: B/l CTA on room air    Abdomen:Randolph feed  inplace soft, nontender, nondistended, positive bowel sounds, last bowel movement   :  diaz in place           Extremities :RUE +2 edema   + radial pulse warm well perfused  LUE WDL  B/Lle warm well perfused no edema  + DP  RUE Midline in AC                                      Physical Therapy Rec:   Home  [  ]   Home w/ PT  [  ]  Rehab  [x  ]    Discussed with Cardiothoracic Team at AM rounds.

## 2020-09-16 NOTE — PROGRESS NOTE ADULT - ASSESSMENT
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 s/p EGD with esophagitis and gastritis. Developed Klebsiella oxytoca bacteremia requiring transfer to CTU. Clinically improving, transferred to [facility], finished 10-day of ceftriaxone, however, developed severe C.diff colitis on Vancomycin 500mg q6h PO and IV Flagyl. He had RHC on 8/25 and found to have high filling pressure so transferred to CTU again.    #C.diff colitis with NORMAN- C.diff PCR detected (8/23). Repeat CT on 8/30 without evidence of toxic megacolon.   - clinically  has hit a plateau and without significant improvement but also without deterioration  tolerating minimal amounts of oral food  continue po vanco  Can restart IV flagyl if change in condition  Would repeat imaging of the abdomen with X-ray and possibly CT scan of abdomen  Monitor clinically for signs of worsening  Encourage oral intake to repopulate the gut jose       #OI prophylaxis-  -Was previously receiving high dose steroids  -On IV cyclosporine now  -CMV viral load(8/17, 8/26): neg  -Valcyte and Bactrim d/c'ed on 8/17 due to leukopenia  -Galactomann<0.5, Fungitell<31  -Would repeat CMV viral load this week  -Will consider bactrim or Atovaquone for PCP ppx when able to tolerated PO    Gary Lujan MD  [phone]  After 5pm/weekends [phone]

## 2020-09-16 NOTE — PROGRESS NOTE ADULT - PROBLEM SELECTOR PLAN 2
Continue closely monitoring H&H and transfuse prn.   Hx of Autoimmune hemolytic anemia, patient with (+) Igg Jacky,  f/u am hemolysis labs. f/u G6PD   heparin   Injectable 5000 Unit(s) SubCutaneous every 8 hours for VTE prophylaxis

## 2020-09-16 NOTE — PROGRESS NOTE ADULT - SUBJECTIVE AND OBJECTIVE BOX
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 Patient was treated w/ prednisone and Rituximab. Tacro was discontinued and patient was also transitioned to everolimus  Admitted  6/16-6/19  for hyperglycemia.  Reported to transplant team having one done black tarry stool-  instructed to  present to [facility] for hemolytic anemia. Patient has been following with Hematology outpatient.  Denies CP  N/V diarrhea SOB. (11 Aug 2020 20:08)      Surgery/Hospital Course:  /11 Admitted to [facility] with symptomatic anemia  8/13 EGD for investigation of tarry stools  8/15 SB capsule: stomach & SB lesions, likely ulcers.  8/17 EGD/push enteroscopy w/ angioectasias/erosions in small bowel. Gastropathy and esophagitis. Ulcer seen on VCE most likely scope trauma.    8/18 VSS transferred back to floor.   8/20 VS 9.0/28.4 stable Gastric biopsy results LA Grade A esophagitis.  - Acute gastritis. Biopsies taken to r/o CMV, No ulceration seen despite finding on capsule endoscopy - likely 2/2 scope  trauma that has since resolved. Pathology pending.  8/21 VSS H/H  8.1/25.7  trending down will monitor prednisone taper 30 mg today. Procardia 120 initiated today RHC biopsy Monday.   8/22 VVS; Patient reports loose stools x 2-3 days with 1 episode today.  Stool sent for C-dif and GI PCR. Abdominal X-ray revealed: dilated loops of bowel. Abdomen distended.  Patient denies N/V. GI called to re-evaluate. Continue with current medication regimen.  Awaiting for upcoming cardiac biopsy on Monday 8/24.  8/23   vss    creat up to 2.28 ,  repeating CMP,  TTE ordered  Bladder scan   8/24   cardiac bx cancelled   elev creat  to 2.74   cardio renal cslt called,    + CDIF   inc vanco to 500 q6   ID following  8/25 VSS: RHC today as per transplant team; transfuse 2 units PRBC today as per Dr. Viramontes; continue vanco for cdiff; transfer patient to CTU after cath today   8/26. Dieretic challenge with good response   8/27: Downgraded to the floor then upgraded to the CTU again for concerning of abd distention   8/28 CT ABD & Pelvis reveals pancolitis  8/30: repeat CT scan of abd, gen surgery called to re-evaluated pt.  8/31: RUE duplex negative for DVT  9/4 NGT clamped, minimal residual. NGT removed. started sips   9/6 tolerating clear liquid diet  9/8 Bronch  9/9 1 prbc   9/10 increased tube feeds to 20cc/hr and tpn decreased to 63cc/hr from 125cc/hr   9/12 TPN discontinued  9/14 Diet advanced, tube feeds at 75% goal, central line removed and midline inserted.  9/14 pt completed approp 75 % of meal.   pt ambulated to the door this afternoon  9/15 Pt tolerating PO diet with supplemental tube feeds. calorie count iniated    Today/Overnight:   ·	    REVIEW OF SYSTEMS:  Gen: No fever  EYES/ENT: No visual changes;  No vertigo or throat pain   NECK: No pain   RES:  No shortness of breath or Cough  CARD: No chest pain   GI: No abdominal pain  : No dysuria  NEURO: No weakness  SKIN: No itching, rashes     ICU Vital Signs Last 24 Hrs  T(C): 36.4 (16 Sep 2020 08:00), Max: 36.9 (15 Sep 2020 20:00)  T(F): 97.6 (16 Sep 2020 08:00), Max: 98.5 (15 Sep 2020 20:00)  HR: 121 (16 Sep 2020 09:00) (120 - 130)  BP: 88/64 (16 Sep 2020 08:30) (80/60 - 104/73)  BP(mean): 71 (16 Sep 2020 08:00) (64 - 85)  ABP: 102/54 (15 Sep 2020 14:00) (100/56 - 112/57)  ABP(mean): 68 (15 Sep 2020 14:00) (68 - 75)  RR: 48 (16 Sep 2020 09:00) (14 - 48)  SpO2: 100% (16 Sep 2020 08:30) (92% - 100%)      Physical Exam:  Gen:  Awake, alert   CNS: non focal 	  Neck: no JVD  RES : Diminished breath sounds B/L , crackles at bases, no wheezing                    CVS: Sinus tachycardia. Normal S1/S2  Abd: Soft, distended. Bowel sounds present.  Skin: No rash.  Ext: B/L upper extremity edema R>L, +1 edema b/l LE.  Lines: Right Radial [place] 9/8, RUE midline 9/14    ============================I/O===========================   I&O's Detail    15 Sep 2020 07:01  -  16 Sep 2020 07:00  --------------------------------------------------------  IN:    Glucerna 1.5: 960 mL    Oral Fluid: 957 mL  Total IN: 1917 mL    OUT:    Indwelling Catheter - Urethral (mL): 1480 mL  Total OUT: 1480 mL    Total NET: 437 mL      16 Sep 2020 07:01  -  16 Sep 2020 09:14  --------------------------------------------------------  IN:    Oral Fluid: 75 mL  Total IN: 75 mL    OUT:    Glucerna 1.5: 0 mL    Indwelling Catheter - Urethral (mL): 45 mL  Total OUT: 45 mL    Total NET: 30 mL        ============================ LABS =========================                        7.7    8.04  )-----------( 81       ( 16 Sep 2020 00:23 )             25.8     09-16    137  |  97  |  71<H>  ----------------------------<  121<H>  4.3   |  31  |  1.30    Ca    8.2<L>      16 Sep 2020 00:23  Phos  3.0     09-16  Mg     2.4     09-16    TPro  5.9<L>  /  Alb  2.7<L>  /  TBili  0.5  /  DBili  x   /  AST  54<H>  /  ALT  48<H>  /  AlkPhos  176<H>  09-16    LIVER FUNCTIONS - ( 16 Sep 2020 00:23 )  Alb: 2.7 g/dL / Pro: 5.9 g/dL / ALK PHOS: 176 U/L / ALT: 48 U/L / AST: 54 U/L / GGT: x             ABG - ( 15 Sep 2020 05:14 )  pH, Arterial: 7.44  pH, Blood: x     /  pCO2: 50    /  pO2: 89    / HCO3: 34    / Base Excess: 8.9   /  SaO2: 98                  ======================Micro/Rad/Cardio=================  Culture: Reviewed   CXR: Reviewed  Echo:Reviewed  ======================================================  PAST MEDICAL & SURGICAL HISTORY:  H/O hemolytic anemia    H/O autoimmune hemolytic anemia    Knee pain, right    HLD (hyperlipidemia)    Former smoker    DVT of upper extremity (deep vein thrombosis)    Hepatitis C virus    GIB (gastrointestinal bleeding)    Ventricular fibrillation  s/p AICD    PAF (paroxysmal atrial fibrillation)  on xarelto    Non-Ischemic Cardiomyopathy    SVT (Supraventricular Tachycardia)    HTN    CHF (Congestive Heart Failure)    S/P right heart catheterization  biopsy multiple    H/O heart transplant  2/2018    Status post left hip replacement    History of Prior Ablation Treatment  for afib    AICD (Automatic Cardioverter/Defibrillator) Present  [employer] with 1 [employer] lead4/1/09- explanted and replaced with Medtronic 2 leads on 9/2/09      ====================ASSESSMENT ==============  Heart transplant 2/2018 for ACC/AHA stage D NICM   Resolved GI bleed, Melena s/p EGD  Supraventricular tachycardia  Severe hypertension  Hyperlactatemia acidosis, resolved  Acute respiratory failure, resolved  Leukopenia- resolved  Acute blood loss anemia, stable   Abdominal distention  ileus  CKD Stage III  C. diff diarrhea  C. diff colitis  Klebsiella oxytoca bacteremia  Sepsis  Azotemia 2/2 CKD and Steroids   Pancolitis  Macrocytic hemolytic anemia with (+) IGg cornel     Plan:  ====================== NEUROLOGY=====================  Nonfocal, continue to monitor neuro status   Continue physical therapy as tolerated     ==================== RESPIRATORY======================  Persistent RLL Collapse on CXR requiring bronchoscopy on 9/08 with elevated hemidiaphragm.  On supplemental O2 via 4L NC, SpO2 %  Encourage incentive spirometry, continue pulse ox monitoring. Encourage chest PT.      ====================CARDIOVASCULAR==================  S/P Heart transplant 2/2018 for ACC/AHA stage D NICM, Transplant team following.   Tacrolimus restarted, will goal of 8-10 being managed by heart failure, off Cellcept while on high dose steroids.   ASA on hold due to hx of GI bleed / ulceration.  Continue Hydralazine 25mg PO q8h for a goal MAP of 70-80    hydrALAZINE 25 milliGRAM(s) Oral every 8 hours  predniSONE   Tablet 10 milliGRAM(s) Oral <User Schedule>    ===================HEMATOLOGIC/ONC ===================  Anemia S/P multiple blood transfusions.   Continue closely monitoring H&H and transfuse prn.   Hx of Autoimmune hemolytic anemia, patient with (+) Igg Cornel,  f/u am hemolysis labs. f/u G6PD     heparin   Injectable 5000 Unit(s) SubCutaneous every 8 hours for VTE prophylaxis     ===================== RENAL =========================  Anasarca, NORMAN on CKD stage III   Continue monitoring urine output, I&OS, BUN/Cr   Diuresis with Lasix     furosemide   Injectable 40 milliGRAM(s) IV Push every 12 hours    ==================== GASTROINTESTINAL===================  Patient with ileus in setting of C.Diff- NGT clamped 9/4 for 4 hrs, no residual.  Monitor BMs and flatus. Now tolerating tube feeds, Glucerna 1.5 Sohail @ goal rate 80 cc/hr  CT A/P on 8/30 with Pancolitis and small volume abdominopelvic ascites, not significantly changed since 8/20/2020.  General surgery following - continue serial abdominal exams. No surgical intervention at this time.   Diet advanced from full liquid to CC/ low fiber diet  loose BM x4 overnight, + c. diff, on PO vanco    dextrose 5%. 1000 milliLiter(s) (50 mL/Hr) IV Continuous <Continuous>  GI prophylaxis, pantoprazole    Tablet 40 milliGRAM(s) Oral before breakfast  sodium chloride 0.9% lock flush 10 milliLiter(s) IV Push every 1 hour PRN Pre/post blood products, medications, blood draw, and to maintain line patency    =======================    ENDOCRINE  =====================  Glucose control with humalog sliding scale, NPH human recombinant, and glucagon prn   Endocrine consulted, was following pt outpatient    dextrose 40% Gel 15 Gram(s) Oral once PRN Blood Glucose LESS THAN 70 milliGRAM(s)/deciliter  dextrose 50% Injectable 12.5 Gram(s) IV Push once  dextrose 50% Injectable 25 Gram(s) IV Push once  glucagon  Injectable 1 milliGRAM(s) IntraMuscular once PRN Glucose LESS THAN 70 milligrams/deciliter  insulin lispro (HumaLOG) corrective regimen sliding scale   SubCutaneous every 6 hours  insulin lispro Injectable (HumaLOG) 3 Unit(s) SubCutaneous three times a day before meals  insulin NPH human recombinant 5 Unit(s) SubCutaneous every 12 hours    ========================INFECTIOUS DISEASE================  BCx 8/13: (+)Klebsiella bacteremia, has since been cleared.  Clostridium difficile PCR 8/23: (+), continue with vancomycin PO  - S/P 5 days of IVIG (8/29-9/1)  - S/P Eravaycline (8/31- 9/6)  - S/P IV flagyl discontinued (8/24-9/1)  - S/P Rectal Vanco (8/23-9/11)  Transplant prophylaxis with Posaconazole and Atovaquone on hold due to ileus  vancomycin    Solution 500 milliGRAM(s) Oral every 6 hours      Patient requires continuous monitoring with bedside rhythm monitoring, pulse ox monitoring, and intermittent blood gas analysis. Care plan discussed with ICU care team. Patient remained critical and at risk for life threatening decompensation.     By signing my name below, I, Ronit Joe, attest that this documentation has been prepared under the direction and in the presence of Colette Mccabe NP   Electronically signed: Ab Gaffney, 09-16-20 @ 09:14    I, Colette Mccabe, personally performed the services described in this documentation. all medical record entries made by the ab were at my direction and in my presence. 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 Patient was treated w/ prednisone and Rituximab. Tacro was discontinued and patient was also transitioned to everolimus  Admitted  6/16-6/19  for hyperglycemia.  Reported to transplant team having one done black tarry stool-  instructed to  present to [facility] for hemolytic anemia. Patient has been following with Hematology outpatient.  Denies CP  N/V diarrhea SOB. (11 Aug 2020 20:08)      Surgery/Hospital Course:  /11 Admitted to [facility] with symptomatic anemia  8/13 EGD for investigation of tarry stools  8/15 SB capsule: stomach & SB lesions, likely ulcers.  8/17 EGD/push enteroscopy w/ angioectasias/erosions in small bowel. Gastropathy and esophagitis. Ulcer seen on VCE most likely scope trauma.    8/18 VSS transferred back to floor.   8/20 VS 9.0/28.4 stable Gastric biopsy results LA Grade A esophagitis.  - Acute gastritis. Biopsies taken to r/o CMV, No ulceration seen despite finding on capsule endoscopy - likely 2/2 scope  trauma that has since resolved. Pathology pending.  8/21 VSS H/H  8.1/25.7  trending down will monitor prednisone taper 30 mg today. Procardia 120 initiated today RHC biopsy Monday.   8/22 VVS; Patient reports loose stools x 2-3 days with 1 episode today.  Stool sent for C-dif and GI PCR. Abdominal X-ray revealed: dilated loops of bowel. Abdomen distended.  Patient denies N/V. GI called to re-evaluate. Continue with current medication regimen.  Awaiting for upcoming cardiac biopsy on Monday 8/24.  8/23   vss    creat up to 2.28 ,  repeating CMP,  TTE ordered  Bladder scan   8/24   cardiac bx cancelled   elev creat  to 2.74   cardio renal cslt called,    + CDIF   inc vanco to 500 q6   ID following  8/25 VSS: RHC today as per transplant team; transfuse 2 units PRBC today as per Dr. Viramontes; continue vanco for cdiff; transfer patient to CTU after cath today   8/26. Dieretic challenge with good response   8/27: Downgraded to the floor then upgraded to the CTU again for concerning of abd distention   8/28 CT ABD & Pelvis reveals pancolitis  8/30: repeat CT scan of abd, gen surgery called to re-evaluated pt.  8/31: RUE duplex negative for DVT  9/4 NGT clamped, minimal residual. NGT removed. started sips   9/6 tolerating clear liquid diet  9/8 Bronch  9/9 1 prbc   9/10 increased tube feeds to 20cc/hr and tpn decreased to 63cc/hr from 125cc/hr   9/12 TPN discontinued  9/14 Diet advanced, tube feeds at 75% goal, central line removed and midline inserted.  9/14 pt completed approp 75 % of meal.   pt ambulated to the door this afternoon  9/15 Pt tolerating PO diet with supplemental tube feeds. calorie count iniated    Today/Overnight:   ·	Pt hemodynamically stable, no complaints of abdominal discomfort with only 1 loose bm overnight.    REVIEW OF SYSTEMS:  Gen: No fever  EYES/ENT: No visual changes;  No vertigo or throat pain   NECK: No pain   RES:  No shortness of breath or Cough  CARD: No chest pain   GI: No abdominal pain  : No dysuria  NEURO: No weakness  SKIN: No itching, rashes     ICU Vital Signs Last 24 Hrs  T(C): 36.4 (16 Sep 2020 08:00), Max: 36.9 (15 Sep 2020 20:00)  T(F): 97.6 (16 Sep 2020 08:00), Max: 98.5 (15 Sep 2020 20:00)  HR: 121 (16 Sep 2020 09:00) (120 - 130)  BP: 88/64 (16 Sep 2020 08:30) (80/60 - 104/73)  BP(mean): 71 (16 Sep 2020 08:00) (64 - 85)  ABP: 102/54 (15 Sep 2020 14:00) (100/56 - 112/57)  ABP(mean): 68 (15 Sep 2020 14:00) (68 - 75)  RR: 48 (16 Sep 2020 09:00) (14 - 48)  SpO2: 100% (16 Sep 2020 08:30) (92% - 100%)      Physical Exam:  Gen:  Awake, alert   CNS: non focal 	  Neck: no JVD  RES : Diminished breath sounds B/L, no wheezing                    CVS: Sinus tachycardia. Normal S1/S2  Abd: Soft, distended. Bowel sounds present.  Skin: No rash.  Ext: RUE upper extremity edema, +1 edema b/l LE.  Lines: RUE midline 9/14    ============================I/O===========================   I&O's Detail    15 Sep 2020 07:01  -  16 Sep 2020 07:00  --------------------------------------------------------  IN:    Glucerna 1.5: 960 mL    Oral Fluid: 957 mL  Total IN: 1917 mL    OUT:    Indwelling Catheter - Urethral (mL): 1480 mL  Total OUT: 1480 mL    Total NET: 437 mL      16 Sep 2020 07:01  -  16 Sep 2020 09:14  --------------------------------------------------------  IN:    Oral Fluid: 75 mL  Total IN: 75 mL    OUT:    Glucerna 1.5: 0 mL    Indwelling Catheter - Urethral (mL): 45 mL  Total OUT: 45 mL    Total NET: 30 mL        ============================ LABS =========================                        7.7    8.04  )-----------( 81       ( 16 Sep 2020 00:23 )             25.8     09-16    137  |  97  |  71<H>  ----------------------------<  121<H>  4.3   |  31  |  1.30    Ca    8.2<L>      16 Sep 2020 00:23  Phos  3.0     09-16  Mg     2.4     09-16    TPro  5.9<L>  /  Alb  2.7<L>  /  TBili  0.5  /  DBili  x   /  AST  54<H>  /  ALT  48<H>  /  AlkPhos  176<H>  09-16    LIVER FUNCTIONS - ( 16 Sep 2020 00:23 )  Alb: 2.7 g/dL / Pro: 5.9 g/dL / ALK PHOS: 176 U/L / ALT: 48 U/L / AST: 54 U/L / GGT: x             ABG - ( 15 Sep 2020 05:14 )  pH, Arterial: 7.44  pH, Blood: x     /  pCO2: 50    /  pO2: 89    / HCO3: 34    / Base Excess: 8.9   /  SaO2: 98                  ======================Micro/Rad/Cardio=================  Culture: Reviewed   CXR: Reviewed  Echo:Reviewed  ======================================================  PAST MEDICAL & SURGICAL HISTORY:  H/O hemolytic anemia    H/O autoimmune hemolytic anemia    Knee pain, right    HLD (hyperlipidemia)    Former smoker    DVT of upper extremity (deep vein thrombosis)    Hepatitis C virus    GIB (gastrointestinal bleeding)    Ventricular fibrillation  s/p AICD    PAF (paroxysmal atrial fibrillation)  on xarelto    Non-Ischemic Cardiomyopathy    SVT (Supraventricular Tachycardia)    HTN    CHF (Congestive Heart Failure)    S/P right heart catheterization  biopsy multiple    H/O heart transplant  2/2018    Status post left hip replacement    History of Prior Ablation Treatment  for afib    AICD (Automatic Cardioverter/Defibrillator) Present  [employer] with 1 [employer] [place] lead4/1/09- explanted and replaced with Medtronic 2 leads on 9/2/09      ====================ASSESSMENT ==============  Heart transplant 2/2018 for ACC/AHA stage D NICM   Resolved GI bleed, Melena s/p EGD  Supraventricular tachycardia  Severe hypertension  Hyperlactatemia acidosis, resolved  Acute respiratory failure, resolved  Leukopenia- resolved  Acute blood loss anemia, stable   Abdominal distention  ileus  CKD Stage III  C. diff diarrhea  C. diff colitis  Klebsiella oxytoca bacteremia  Sepsis  Azotemia 2/2 CKD and Steroids   Pancolitis  Macrocytic hemolytic anemia with (+) IGg cornel     Plan:  ====================== NEUROLOGY=====================  Nonfocal, continue to monitor neuro status   Continue physical therapy as tolerated     ==================== RESPIRATORY======================  Persistent RLL Collapse on CXR requiring bronchoscopy on 9/08 with elevated hemidiaphragm.  On rm air, SpO2 %  Encourage incentive spirometry, continue pulse ox monitoring. Encourage chest PT.      ====================CARDIOVASCULAR==================  S/P Heart transplant 2/2018 for ACC/AHA stage D NICM, Transplant team following.   Tacrolimus restarted, will goal of 8-10 being managed by heart failure, off Cellcept while on high dose steroids.   ASA on hold due to hx of GI bleed / ulceration  decreased hydralazine to 25mg q8 due to systolics 80-90s overnight     hydrALAZINE 25 milliGRAM(s) Oral every 8 hours  predniSONE   Tablet 10 milliGRAM(s) Oral <User Schedule>    ===================HEMATOLOGIC/ONC ===================  Anemia S/P multiple blood transfusions.   Continue closely monitoring H&H and transfuse prn.   Hx of Autoimmune hemolytic anemia, patient with (+) Igg Cornel,  f/u am hemolysis labs. f/u G6PD     heparin   Injectable 5000 Unit(s) SubCutaneous every 8 hours for VTE prophylaxis     ===================== RENAL =========================  Anasarca, NORMAN on CKD stage III   Continue monitoring urine output, I&OS, BUN/Cr   UO 20-30ml/hr, pt euvolemic     furosemide   Injectable 40 milliGRAM(s) IV Push every 12 hours    ==================== GASTROINTESTINAL===================  Patient with ileus in setting of C.Diff- NGT clamped 9/4 for 4 hrs, no residual.  Monitor BMs and flatus. Now tolerating tube feeds, Glucerna 1.5 Sohail @ goal rate 80 cc/hr  CT A/P on 8/30 with Pancolitis and small volume abdominopelvic ascites, not significantly changed since 8/20/2020.  General surgery following - continue serial abdominal exams. No surgical intervention at this time.   Diet advanced from full liquid to CC/ low fiber diet  glucerna feeds changed from 40ml/hr to nocturnal feeds from 6p-6a due to poor PO intake during meals   loose BM x1 overnight, + c. diff, on PO vanco      GI prophylaxis, pantoprazole    Tablet 40 milliGRAM(s) Oral before breakfast    =======================    ENDOCRINE  =====================  Glucose control with humalog sliding scale, NPH human recombinant  Monitor glucose while feeds changed to nocturnal and with poor PO intake      insulin lispro (HumaLOG) corrective regimen sliding scale   SubCutaneous every 6 hours  insulin lispro Injectable (HumaLOG) 3 Unit(s) SubCutaneous three times a day before meals  insulin NPH human recombinant 5 Unit(s) SubCutaneous every 12 hours    ========================INFECTIOUS DISEASE================  BCx 8/13: (+)Klebsiella bacteremia, has since been cleared.  Clostridium difficile PCR 8/23: (+), continue with vancomycin PO  - S/P 5 days of IVIG (8/29-9/1)  - S/P Eravaycline (8/31- 9/6)  - S/P IV flagyl discontinued (8/24-9/1)  - S/P Rectal Vanco (8/23-9/11)  Transplant prophylaxis with Posaconazole and Atovaquone on hold due to ileus  vancomycin    Solution 500 milliGRAM(s) Oral every 6 hours      Patient requires continuous monitoring with bedside rhythm monitoring, pulse ox monitoring, and intermittent blood gas analysis. Care plan discussed with ICU care team. Patient remained critical and at risk for life threatening decompensation.     By signing my name below, I, Ronit Joe, attest that this documentation has been prepared under the direction and in the presence of Colette Mccabe NP   Electronically signed: Katty Gaffney, 09-16-20 @ 09:14    I, Colette Mccabe, personally performed the services described in this documentation. all medical record entries made by the ramuibkurt were at my direction and in my presence. I have reviewed the chart and agree that the record reflects my personal performance and is accurate and complete  Electronically signed: Colette Mccabe NP

## 2020-09-16 NOTE — PROGRESS NOTE ADULT - ASSESSMENT
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 Patient was treated w/ prednisone and Rituximab. Tacro was discontinued and patient was also transitioned to everolimus  Admitted  6/16-6/19  for hyperglycemia.  Reported to transplant team having one done black tarry stool-  instructed to  present to [facility] for hemolytic anemia. Patient has been following with Hematology outpatient.  Denies CP  N/V diarrhea SOB. (11 Aug 2020 20:08)  /11 Admitted to [facility] with symptomatic anemia  8/13 EGD for investigation of tarry stools  8/15 SB capsule: stomach & SB lesions, likely ulcers.  8/17 EGD/push enteroscopy w/ angioectasias/erosions in small bowel. Gastropathy and esophagitis. Ulcer seen on VCE most likely scope trauma.    8/18 VSS transferred back to floor.   8/20 VS 9.0/28.4 stable Gastric biopsy results LA Grade A esophagitis.  - Acute gastritis. Biopsies taken to r/o CMV, No ulceration seen despite finding on capsule endoscopy - likely 2/2 scope  trauma that has since resolved. Pathology pending.  8/21 VSS H/H  8.1/25.7  trending down will monitor prednisone taper 30 mg today. Procardia 120 initiated today RHC biopsy Monday.   8/22 VVS; Patient reports loose stools x 2-3 days with 1 episode today.  Stool sent for C-dif and GI PCR. Abdominal X-ray revealed: dilated loops of bowel. Abdomen distended.  Patient denies N/V. GI called to re-evaluate. Continue with current medication regimen.  Awaiting for upcoming cardiac biopsy on Monday 8/24.  8/23   vss    creat up to 2.28 ,  repeating CMP,  TTE ordered  Bladder scan   8/24   cardiac bx cancelled   elev creat  to 2.74   cardio renal cslt called,    + CDIF   inc vanco to 500 q6   ID following  8/25 VSS: RHC today as per transplant team; transfuse 2 units PRBC today as per Dr. Viramontes; continue vanco for cdiff; transfer patient to CTU after cath today   8/26. Dieretic challenge with good response   8/27: Downgraded to the floor then upgraded to the CTU again for concerning of abd distention   8/28 CT ABD & Pelvis reveals pancolitis  8/30: repeat CT scan of abd, gen surgery called to re-evaluated pt.  8/31: RUE duplex negative for DVT  9/4 NGT clamped, minimal residual. NGT removed. started sips   9/6 tolerating clear liquid diet  9/8 Bronch  9/9 1 prbc   9/10 increased tube feeds to 20cc/hr and tpn decreased to 63cc/hr from 125cc/hr   9/12 TPN discontinued  9/14 Diet advanced, tube feeds at 75% goal, central line removed and midline inserted.  9/14 pt completed approp 75 % of meal.   pt ambulated to the door this afternoon  9/15 Pt tolerating PO diet with supplemental tube feeds. calorie count iniated  9/16 VSS OOB in chair no acute distress transferred to [address] [place] [address] nocturnal tube feeds 80 cc/hrx12 - RUE + edema Tacro level 7.2  PT disposition  to rehab.

## 2020-09-16 NOTE — PROGRESS NOTE ADULT - PROBLEM SELECTOR PLAN 4
Patient with ileus in setting of C.Diff- illeus resolved with bowel rest  TPN d/c   Gen  surgery Following no surgical intervention  CC/ low fiber diet  Nocturnal Glucerna tube feeds via Keofeed  Vancomycin PO

## 2020-09-16 NOTE — PROGRESS NOTE ADULT - ASSESSMENT
A/P:  70y M with history of heart transplant in 2/2018 admitted with autoimmune hemolytic anemia and dark stools requiring transfusion. Found to have c diff and abdominal distension on 8/23.  Pt developed Ileus w/ NGT placement and was made NPO.  Ileus resolving and TF & diet slowly being reintroduced.    TPN team was consulted to assist w/ management in pt who was NPO for a prolonged period of time.  Pt  now on a hybrid of TF & oral nutrition  Pt w/ Acute Severe Protein-Calorie Malnutrition  TPN stopped 9/12/2020.  Pt tolerating LRD /ADA & TF/ Glucerna- plan for TF over 12hr qhs and oral diet           - HyperTg- improving w/ TPN stopped, continue to monitor   - Hyperglycemia- improving               Continue w/ NPH 5U q12h & Humolog 3U qmeal              FS w/ ISS as per CTU  - Strict Intake and Output- fluid overload improving             Lasix 40mg now q12h             Sheldon resolved  - HypoMg & HypoPhos- improving off tpn             Maintaining Mg levels will assist GI motility  - Elevated Alk Phos & Lfts- improving -continue to monitor  - Weights as per protocol  - Monitor  CMP, Mg, iCa, & Phos daily  - Check Prealbumin check weekly  - PICC w/ designated port for TPN, maintenance as per protocol  - Continue monitoring as per CTS, will follow with lobito, d/w CTS team    Gena Causey PA-C  (B) [phone]  d/w Ayan Hansen & Marciano

## 2020-09-16 NOTE — PROGRESS NOTE ADULT - SUBJECTIVE AND OBJECTIVE BOX
INFECTIOUS DISEASES FOLLOW UP-- Sujey Lujan  [phone]    This is a follow up note for this  70yMale with  Anemia  Patient transferred to the floor  Remains with anorexia- poor oral intake        ROS:  CONSTITUTIONAL:  No fever, poor appetite  CARDIOVASCULAR:  No chest pain or palpitations  RESPIRATORY:  No dyspnea  GASTROINTESTINAL: repeats less abdominal pain but still with fullness and watery stool  GENITOURINARY:  No dysuria  NEUROLOGIC:  No headache,     Allergies    No Known Allergies    Intolerances        ANTIBIOTICS/RELEVANT:  antimicrobials  vancomycin    Solution 500 milliGRAM(s) Oral every 6 hours    immunologic:  tacrolimus 4 milliGRAM(s) Oral <User Schedule>    OTHER:  benzocaine 15 mG/menthol 3.6 mG (Sugar-Free) Lozenge 1 Lozenge Oral every 6 hours PRN  chlorhexidine 2% Cloths 1 Application(s) Topical <User Schedule>  dextrose 40% Gel 15 Gram(s) Oral once PRN  dextrose 5%. 1000 milliLiter(s) IV Continuous <Continuous>  dextrose 50% Injectable 25 Gram(s) IV Push once  dextrose 50% Injectable 12.5 Gram(s) IV Push once  dextrose 50% Injectable 25 Gram(s) IV Push once  furosemide   Injectable 40 milliGRAM(s) IV Push every 12 hours  glucagon  Injectable 1 milliGRAM(s) IntraMuscular once PRN  heparin   Injectable 5000 Unit(s) SubCutaneous every 8 hours  hydrALAZINE 25 milliGRAM(s) Oral every 8 hours  insulin lispro (HumaLOG) corrective regimen sliding scale   SubCutaneous every 6 hours  insulin lispro Injectable (HumaLOG) 3 Unit(s) SubCutaneous three times a day before meals  insulin NPH human recombinant 5 Unit(s) SubCutaneous every 12 hours  pantoprazole    Tablet 40 milliGRAM(s) Oral before breakfast  predniSONE   Tablet 10 milliGRAM(s) Oral <User Schedule>  sodium chloride 0.9% lock flush 10 milliLiter(s) IV Push every 1 hour PRN  sodium chloride 0.9% lock flush 3 milliLiter(s) IV Push every 8 hours      Objective:  Vital Signs Last 24 Hrs  T(C): 36.7 (16 Sep 2020 19:35), Max: 36.8 (16 Sep 2020 00:00)  T(F): 98.1 (16 Sep 2020 19:35), Max: 98.3 (16 Sep 2020 00:00)  HR: 120 (16 Sep 2020 19:35) (114 - 129)  BP: 97/69 (16 Sep 2020 19:35) (80/60 - 112/74)  BP(mean): 75 (16 Sep 2020 09:00) (64 - 75)  RR: 18 (16 Sep 2020 19:35) (14 - 48)  SpO2: 99% (16 Sep 2020 19:35) (92% - 100%)    PHYSICAL EXAM:  Constitutional:no acute distress but frail and depressed  Eyes:WALT, EOMI  Ear/Nose/Throat: no oral lesions, 	  Respiratory: clear BL  Cardiovascular: S1S2  Gastrointestinal: distended but softer exam than previous   Extremities:no e/e/c  No Lymphadenopathy  IV sites not inflammed.    LABS:                        7.7    8.04  )-----------( 81       ( 16 Sep 2020 00:23 )             25.8     09-16    137  |  97  |  71<H>  ----------------------------<  121<H>  4.3   |  31  |  1.30    Ca    8.2<L>      16 Sep 2020 00:23  Phos  3.0     09-16  Mg     2.4     09-16    TPro  5.9<L>  /  Alb  2.7<L>  /  TBili  0.5  /  DBili  x   /  AST  54<H>  /  ALT  48<H>  /  AlkPhos  176<H>  09-16          MICROBIOLOGY:            RECENT CULTURES:      RADIOLOGY & ADDITIONAL STUDIES:    < from: Xray Chest 1 View- PORTABLE-Routine (Xray Chest 1 View- PORTABLE-Routine in AM.) (09.16.20 @ 03:00) >    IMPRESSION:  Stableatelectasis and small right pleural effusion. Mild left congestion.    < end of copied text >

## 2020-09-17 DIAGNOSIS — R74.0 NONSPECIFIC ELEVATION OF LEVELS OF TRANSAMINASE AND LACTIC ACID DEHYDROGENASE [LDH]: ICD-10-CM

## 2020-09-17 LAB
C DIFF GDH STL QL: NEGATIVE — SIGNIFICANT CHANGE UP
C DIFF GDH STL QL: SIGNIFICANT CHANGE UP
GLUCOSE BLDC GLUCOMTR-MCNC: 112 MG/DL — HIGH (ref 70–99)
GLUCOSE BLDC GLUCOMTR-MCNC: 136 MG/DL — HIGH (ref 70–99)
HAPTOGLOB SERPL-MCNC: 94 MG/DL — SIGNIFICANT CHANGE UP (ref 34–200)
TACROLIMUS SERPL-MCNC: 21.4 NG/ML — SIGNIFICANT CHANGE UP
TRIGL SERPL-MCNC: 395 MG/DL — HIGH (ref 10–149)

## 2020-09-17 PROCEDURE — 99232 SBSQ HOSP IP/OBS MODERATE 35: CPT

## 2020-09-17 PROCEDURE — 93931 UPPER EXTREMITY STUDY: CPT | Mod: 26

## 2020-09-17 PROCEDURE — 99233 SBSQ HOSP IP/OBS HIGH 50: CPT

## 2020-09-17 PROCEDURE — 93971 EXTREMITY STUDY: CPT | Mod: 26

## 2020-09-17 RX ORDER — VANCOMYCIN HCL 1 G
125 VIAL (EA) INTRAVENOUS EVERY 6 HOURS
Refills: 0 | Status: DISCONTINUED | OUTPATIENT
Start: 2020-09-17 | End: 2020-09-24

## 2020-09-17 RX ADMIN — CHLORHEXIDINE GLUCONATE 1 APPLICATION(S): 213 SOLUTION TOPICAL at 15:22

## 2020-09-17 RX ADMIN — SODIUM CHLORIDE 3 MILLILITER(S): 9 INJECTION INTRAMUSCULAR; INTRAVENOUS; SUBCUTANEOUS at 06:15

## 2020-09-17 RX ADMIN — TACROLIMUS 4 MILLIGRAM(S): 5 CAPSULE ORAL at 20:13

## 2020-09-17 RX ADMIN — Medication 25 MILLIGRAM(S): at 21:56

## 2020-09-17 RX ADMIN — Medication 500 MILLIGRAM(S): at 15:29

## 2020-09-17 RX ADMIN — HEPARIN SODIUM 5000 UNIT(S): 5000 INJECTION INTRAVENOUS; SUBCUTANEOUS at 21:56

## 2020-09-17 RX ADMIN — HEPARIN SODIUM 5000 UNIT(S): 5000 INJECTION INTRAVENOUS; SUBCUTANEOUS at 06:16

## 2020-09-17 RX ADMIN — HEPARIN SODIUM 5000 UNIT(S): 5000 INJECTION INTRAVENOUS; SUBCUTANEOUS at 14:28

## 2020-09-17 RX ADMIN — Medication 40 MILLIGRAM(S): at 17:51

## 2020-09-17 RX ADMIN — Medication 5 MILLIGRAM(S): at 12:43

## 2020-09-17 RX ADMIN — SODIUM CHLORIDE 3 MILLILITER(S): 9 INJECTION INTRAMUSCULAR; INTRAVENOUS; SUBCUTANEOUS at 14:24

## 2020-09-17 RX ADMIN — HUMAN INSULIN 5 UNIT(S): 100 INJECTION, SUSPENSION SUBCUTANEOUS at 17:51

## 2020-09-17 RX ADMIN — Medication 125 MILLIGRAM(S): at 23:54

## 2020-09-17 RX ADMIN — Medication 5 MILLIGRAM(S): at 06:17

## 2020-09-17 RX ADMIN — Medication 3 UNIT(S): at 17:13

## 2020-09-17 RX ADMIN — Medication 500 MILLIGRAM(S): at 03:05

## 2020-09-17 RX ADMIN — PANTOPRAZOLE SODIUM 40 MILLIGRAM(S): 20 TABLET, DELAYED RELEASE ORAL at 06:17

## 2020-09-17 RX ADMIN — SODIUM CHLORIDE 3 MILLILITER(S): 9 INJECTION INTRAMUSCULAR; INTRAVENOUS; SUBCUTANEOUS at 21:43

## 2020-09-17 NOTE — PROGRESS NOTE ADULT - SUBJECTIVE AND OBJECTIVE BOX
[facility] NUTRITION SUPPORT / TPN -- FOLLOW UP NOTE  --------------------------------------------------------------------------------    24 hour events/subjective:    pt at goal with TF overnight       ate some breakfast, but was 'full'       lunch & dinner eating a little more  calorie count noted  tolerating TF & LRD ADA diet  no abdominal pain  no bloating  No N/ V-  (+) flatus/ (+)BM   no f/c/s  no dyspnea/ sob/ palp/ cp    Diet:  Diet, Consistent Carbohydrate/No Snacks:   Fiber/Residue Restricted (LOWFIBER)  Tube Feeding Modality: Nasogastric  Glucerna 1.5 Sohail (GLUCERNA1.5RTH)  Total Volume for 24 Hours (mL): 960  Continuous  Until Goal Tube Feed Rate (mL per Hour): 80  Tube Feed Duration (in Hours): 12  Tube Feed Start Time: 18:00  Supplement Feeding Modality:  Oral  Glucerna Shake Cans or Servings Per Day:  2       Frequency:  Daily (09-16-20 @ 07:37)      Appetite: [ x ]Poor [  ]Adequate [  ]Good  Caloric intake:  [ x  ]  Adequate   [   ] Inadequate    ROS: General/ GI see HPI  all other systems negative      ALLERGIES & MEDICATIONS  --------------------------------------------------------------------------------  No Known Allergies      STANDING INPATIENT MEDICATIONS    chlorhexidine 2% Cloths 1 Application(s) Topical <User Schedule>  dextrose 5%. 1000 milliLiter(s) IV Continuous <Continuous>  dextrose 50% Injectable 12.5 Gram(s) IV Push once  dextrose 50% Injectable 25 Gram(s) IV Push once  dextrose 50% Injectable 25 Gram(s) IV Push once  dronabinol 5 milliGRAM(s) Oral <User Schedule>  furosemide   Injectable 40 milliGRAM(s) IV Push every 12 hours  heparin   Injectable 5000 Unit(s) SubCutaneous every 8 hours  hydrALAZINE 25 milliGRAM(s) Oral every 8 hours  insulin lispro (HumaLOG) corrective regimen sliding scale   SubCutaneous every 6 hours  insulin lispro Injectable (HumaLOG) 3 Unit(s) SubCutaneous three times a day before meals  insulin NPH human recombinant 5 Unit(s) SubCutaneous every 12 hours  pantoprazole    Tablet 40 milliGRAM(s) Oral before breakfast  predniSONE   Tablet 10 milliGRAM(s) Oral <User Schedule>  sodium chloride 0.9% lock flush 3 milliLiter(s) IV Push every 8 hours  tacrolimus 4 milliGRAM(s) Oral <User Schedule>  vancomycin    Solution 500 milliGRAM(s) Oral every 6 hours      PRN INPATIENT MEDICATION  benzocaine 15 mG/menthol 3.6 mG (Sugar-Free) Lozenge 1 Lozenge Oral every 6 hours PRN  dextrose 40% Gel 15 Gram(s) Oral once PRN  glucagon  Injectable 1 milliGRAM(s) IntraMuscular once PRN  sodium chloride 0.9% lock flush 10 milliLiter(s) IV Push every 1 hour PRN        VITALS/PHYSICAL EXAM  --------------------------------------------------------------------------------  T(C): 36.8 (09-17-20 @ 06:08), Max: 36.8 (09-17-20 @ 06:08)  HR: 125 (09-17-20 @ 10:35) (114 - 125)  BP: 111/77 (09-17-20 @ 10:35) (93/62 - 112/74)  RR: 18 (09-17-20 @ 10:35) (16 - 18)  SpO2: 96% (09-17-20 @ 10:35) (96% - 100%)  Wt(kg): --        09-16-20 @ 07:01  -  09-17-20 @ 07:00  --------------------------------------------------------  IN: 2595 mL / OUT: 995 mL / NET: 1600 mL    09-17-20 @ 07:01  -  09-17-20 @ 12:40  --------------------------------------------------------  IN: 360 mL / OUT: 500 mL / NET: -140 mL        PHYSICAL EXAM:  GEN:  guarded but stable, WD/WN/WG, (+)KO TF  HEENT: NC/AT, PERRL, mucosa moist & throat clear, no trachea midline w/ neck supple  GI: (+) BS, softly distended, nontender   MSK:  FROM x4, no deformities  VASCULAR: Equally warm, no cyanosis, clubbing,          BLE mild edema equal,  R>LUE edema- soft nontender w/o cord or erythema  Neurology; no focal deficits, weakened strength & sensation grossly intact  Psych: normal affect  Skin: warm,  moist, & w/ good turgor    LABS/ CULTURES/ RADIOLOGY:              8.5    7.34  >-----------<  93       [09-17-20 @ 09:26]              29.0     138  |  99  |  61  ----------------------------<  121      [09-17-20 @ 09:27]  3.8   |  29  |  1.31        Ca     8.2     [09-17-20 @ 09:27]      Mg     2.5     [09-17-20 @ 09:27]      Phos  3.3     [09-17-20 @ 09:27]    TPro  5.9  /  Alb  2.7  /  TBili  0.5  /  DBili  x   /  AST  54  /  ALT  48  /  AlkPhos  176  [09-16-20 @ 00:23]      CAPILLARY BLOOD GLUCOSE  POCT Blood Glucose.: 127 mg/dL (17 Sep 2020 12:25)  POCT Blood Glucose.: 136 mg/dL (17 Sep 2020 06:04)  POCT Blood Glucose.: 137 mg/dL (16 Sep 2020 23:58)  POCT Blood Glucose.: 130 mg/dL (16 Sep 2020 16:51)  POCT Blood Glucose.: 152 mg/dL (16 Sep 2020 14:13)    Prealbumin, Serum: 25 mg/dL (09-13-20 @ 05:27)  Prealbumin, Serum: 22 mg/dL (09-08-20 @ 16:22)  Prealbumin, Serum: 10 mg/dL (09-02-20 @ 08:12)  Prealbumin, Serum: 24 mg/dL (08-25-20 @ 15:10)      Triglycerides, Serum: 188 mg/dL (09-12-20 @ 00:39)  Triglycerides, Serum: 185 mg/dL (09-11-20 @ 00:31)  Triglycerides, Serum: 200 mg/dL (09-10-20 @ 00:41)  Triglycerides, Serum: 289 mg/dL (09-09-20 @ 00:29)  Triglycerides, Serum: 326 mg/dL (09-08-20 @ 11:05) [facility] NUTRITION SUPPORT / TPN -- FOLLOW UP NOTE  --------------------------------------------------------------------------------    24 hour events/subjective:    pt at goal with TF overnight       ate some breakfast, but was 'full'       lunch & dinner eating a little more  calorie count noted  tolerating TF & LRD ADA diet  no abdominal pain  no bloating  No N/ V-  (+) flatus/ (+)BM   no f/c/s  no dyspnea/ sob/ palp/ cp    Diet:  Diet, Consistent Carbohydrate/No Snacks:   Fiber/Residue Restricted (LOWFIBER)  Tube Feeding Modality: Nasogastric  Glucerna 1.5 Sohail (GLUCERNA1.5RTH)  Total Volume for 24 Hours (mL): 960  Continuous  Until Goal Tube Feed Rate (mL per Hour): 80  Tube Feed Duration (in Hours): 12  Tube Feed Start Time: 18:00  Supplement Feeding Modality:  Oral  Glucerna Shake Cans or Servings Per Day:  2       Frequency:  Daily (09-16-20 @ 07:37)      Appetite: [ x ]Poor [  ]Adequate [  ]Good  Caloric intake:  [ x  ]  Adequate   [   ] Inadequate    ROS: General/ GI see HPI  all other systems negative      ALLERGIES & MEDICATIONS  --------------------------------------------------------------------------------  No Known Allergies      STANDING INPATIENT MEDICATIONS    chlorhexidine 2% Cloths 1 Application(s) Topical <User Schedule>  dextrose 5%. 1000 milliLiter(s) IV Continuous <Continuous>  dextrose 50% Injectable 12.5 Gram(s) IV Push once  dextrose 50% Injectable 25 Gram(s) IV Push once  dextrose 50% Injectable 25 Gram(s) IV Push once  dronabinol 5 milliGRAM(s) Oral <User Schedule>  furosemide   Injectable 40 milliGRAM(s) IV Push every 12 hours  heparin   Injectable 5000 Unit(s) SubCutaneous every 8 hours  hydrALAZINE 25 milliGRAM(s) Oral every 8 hours  insulin lispro (HumaLOG) corrective regimen sliding scale   SubCutaneous every 6 hours  insulin lispro Injectable (HumaLOG) 3 Unit(s) SubCutaneous three times a day before meals  insulin NPH human recombinant 5 Unit(s) SubCutaneous every 12 hours  pantoprazole    Tablet 40 milliGRAM(s) Oral before breakfast  predniSONE   Tablet 10 milliGRAM(s) Oral <User Schedule>  sodium chloride 0.9% lock flush 3 milliLiter(s) IV Push every 8 hours  tacrolimus 4 milliGRAM(s) Oral <User Schedule>  vancomycin    Solution 500 milliGRAM(s) Oral every 6 hours      PRN INPATIENT MEDICATION  benzocaine 15 mG/menthol 3.6 mG (Sugar-Free) Lozenge 1 Lozenge Oral every 6 hours PRN  dextrose 40% Gel 15 Gram(s) Oral once PRN  glucagon  Injectable 1 milliGRAM(s) IntraMuscular once PRN  sodium chloride 0.9% lock flush 10 milliLiter(s) IV Push every 1 hour PRN        VITALS/PHYSICAL EXAM  --------------------------------------------------------------------------------  T(C): 36.8 (09-17-20 @ 06:08), Max: 36.8 (09-17-20 @ 06:08)  HR: 125 (09-17-20 @ 10:35) (114 - 125)  BP: 111/77 (09-17-20 @ 10:35) (93/62 - 112/74)  RR: 18 (09-17-20 @ 10:35) (16 - 18)  SpO2: 96% (09-17-20 @ 10:35) (96% - 100%)  Wt(kg): --        09-16-20 @ 07:01  -  09-17-20 @ 07:00  --------------------------------------------------------  IN: 2595 mL / OUT: 995 mL / NET: 1600 mL    09-17-20 @ 07:01  -  09-17-20 @ 12:40  --------------------------------------------------------  IN: 360 mL / OUT: 500 mL / NET: -140 mL        PHYSICAL EXAM:  GEN:  guarded but stable, WD/WN/WG, (+)KO TF  HEENT: NC/AT, PERRL, mucosa moist & throat clear, no trachea midline w/ neck supple  GI: (+) BS, softly distended, nontender   MSK:  FROM x4, no deformities  VASCULAR: Equally warm, no cyanosis, clubbing,          BLE mild edema equal,  R>LUE edema- soft nontender w/o cord or erythema  Neurology; no focal deficits, weakened strength & sensation grossly intact  Psych: normal affect  Skin: warm,  moist, & w/ good turgor    LABS/ CULTURES/ RADIOLOGY:              8.5    7.34  >-----------<  93       [09-17-20 @ 09:26]              29.0     138  |  99  |  61  ----------------------------<  121      [09-17-20 @ 09:27]  3.8   |  29  |  1.31        Ca     8.2     [09-17-20 @ 09:27]      Mg     2.5     [09-17-20 @ 09:27]      Phos  3.3     [09-17-20 @ 09:27]    TPro  5.9  /  Alb  2.7  /  TBili  0.5  /  DBili  x   /  AST  54  /  ALT  48  /  AlkPhos  176  [09-16-20 @ 00:23]      CAPILLARY BLOOD GLUCOSE  POCT Blood Glucose.: 127 mg/dL (17 Sep 2020 12:25)  POCT Blood Glucose.: 136 mg/dL (17 Sep 2020 06:04)  POCT Blood Glucose.: 137 mg/dL (16 Sep 2020 23:58)  POCT Blood Glucose.: 130 mg/dL (16 Sep 2020 16:51)  POCT Blood Glucose.: 152 mg/dL (16 Sep 2020 14:13)    Prealbumin, Serum: 25 mg/dL (09-13-20 @ 05:27)  Prealbumin, Serum: 22 mg/dL (09-08-20 @ 16:22)  Prealbumin, Serum: 10 mg/dL (09-02-20 @ 08:12)  Prealbumin, Serum: 24 mg/dL (08-25-20 @ 15:10)  Triglycerides, Serum: 395 mg/dL  (09.17.20 @ 09:27)  Triglycerides, Serum: 188 mg/dL (09-12-20 @ 00:39)  Triglycerides, Serum: 185 mg/dL (09-11-20 @ 00:31)  Triglycerides, Serum: 200 mg/dL (09-10-20 @ 00:41)  Triglycerides, Serum: 289 mg/dL (09-09-20 @ 00:29)  Triglycerides, Serum: 326 mg/dL (09-08-20 @ 11:05)

## 2020-09-17 NOTE — PROGRESS NOTE ADULT - SUBJECTIVE AND OBJECTIVE BOX
INFECTIOUS DISEASES FOLLOW UP-- Sujey Lujan  [phone]    This is a follow up note for this  70yMale with  Anemia  ID following for severe C.diff colitis  Diarrhea persists with 5BMs a day- but with some evidence of improving stool consistency        ROS:  CONSTITUTIONAL:  No fever, very poor appetite  CARDIOVASCULAR:  No chest pain or palpitations  RESPIRATORY:  No dyspnea  GASTROINTESTINAL:  persistent loose stool  GENITOURINARY:  No dysuria  NEUROLOGIC:  No headache,     Allergies    No Known Allergies    Intolerances        ANTIBIOTICS/RELEVANT:  antimicrobials  vancomycin    Solution 500 milliGRAM(s) Oral every 6 hours    immunologic:  tacrolimus 4 milliGRAM(s) Oral <User Schedule>    OTHER:  benzocaine 15 mG/menthol 3.6 mG (Sugar-Free) Lozenge 1 Lozenge Oral every 6 hours PRN  chlorhexidine 2% Cloths 1 Application(s) Topical <User Schedule>  dextrose 40% Gel 15 Gram(s) Oral once PRN  dextrose 5%. 1000 milliLiter(s) IV Continuous <Continuous>  dextrose 50% Injectable 12.5 Gram(s) IV Push once  dextrose 50% Injectable 25 Gram(s) IV Push once  dextrose 50% Injectable 25 Gram(s) IV Push once  dronabinol 5 milliGRAM(s) Oral <User Schedule>  furosemide   Injectable 40 milliGRAM(s) IV Push every 12 hours  glucagon  Injectable 1 milliGRAM(s) IntraMuscular once PRN  heparin   Injectable 5000 Unit(s) SubCutaneous every 8 hours  hydrALAZINE 25 milliGRAM(s) Oral every 8 hours  insulin lispro (HumaLOG) corrective regimen sliding scale   SubCutaneous every 6 hours  insulin lispro Injectable (HumaLOG) 3 Unit(s) SubCutaneous three times a day before meals  insulin NPH human recombinant 5 Unit(s) SubCutaneous every 12 hours  pantoprazole    Tablet 40 milliGRAM(s) Oral before breakfast  predniSONE   Tablet 10 milliGRAM(s) Oral <User Schedule>  sodium chloride 0.9% lock flush 10 milliLiter(s) IV Push every 1 hour PRN  sodium chloride 0.9% lock flush 3 milliLiter(s) IV Push every 8 hours      Objective:  Vital Signs Last 24 Hrs  T(C): 36.7 (17 Sep 2020 13:15), Max: 36.8 (17 Sep 2020 06:08)  T(F): 98 (17 Sep 2020 13:15), Max: 98.3 (17 Sep 2020 06:08)  HR: 123 (17 Sep 2020 13:15) (120 - 125)  BP: 95/68 (17 Sep 2020 13:15) (93/62 - 112/74)  BP(mean): --  RR: 18 (17 Sep 2020 13:15) (18 - 18)  SpO2: 95% (17 Sep 2020 13:15) (95% - 100%)    PHYSICAL EXAM:  Constitutional:no acute distress  Eyes:WALT, EOMI  Ear/Nose/Throat: no oral lesions, NG feeding tube in place	  Respiratory: clear BL  Cardiovascular: S1S2  Gastrointestinal: remains distended but not firm  Extremities:no e/e/c  No Lymphadenopathy  IV sites not inflammed.    LABS:                        8.5    7.34  )-----------( 93       ( 17 Sep 2020 09:26 )             29.0     09-17    138  |  99  |  61<H>  ----------------------------<  121<H>  3.8   |  29  |  1.31<H>    Ca    8.2<L>      17 Sep 2020 09:27  Phos  3.3     09-17  Mg     2.5     09-17    TPro  5.9<L>  /  Alb  2.7<L>  /  TBili  0.5  /  DBili  x   /  AST  54<H>  /  ALT  48<H>  /  AlkPhos  176<H>  09-16          MICROBIOLOGY:            RECENT CULTURES:      RADIOLOGY & ADDITIONAL STUDIES:    < from: Xray Chest 1 View- PORTABLE-Routine (Xray Chest 1 View- PORTABLE-Routine in AM.) (09.16.20 @ 03:00) >  IMPRESSION:  Stableatelectasis and small right pleural effusion. Mild left congestion.    < end of copied text >

## 2020-09-17 NOTE — PROGRESS NOTE ADULT - SUBJECTIVE AND OBJECTIVE BOX
Subjective:  Transferred to floor. Notes poor appetite.     Medications:  benzocaine 15 mG/menthol 3.6 mG (Sugar-Free) Lozenge 1 Lozenge Oral every 6 hours PRN  chlorhexidine 2% Cloths 1 Application(s) Topical <User Schedule>  dextrose 40% Gel 15 Gram(s) Oral once PRN  dextrose 5%. 1000 milliLiter(s) IV Continuous <Continuous>  dextrose 50% Injectable 12.5 Gram(s) IV Push once  dextrose 50% Injectable 25 Gram(s) IV Push once  dextrose 50% Injectable 25 Gram(s) IV Push once  dronabinol 5 milliGRAM(s) Oral <User Schedule>  furosemide   Injectable 40 milliGRAM(s) IV Push every 12 hours  glucagon  Injectable 1 milliGRAM(s) IntraMuscular once PRN  heparin   Injectable 5000 Unit(s) SubCutaneous every 8 hours  hydrALAZINE 25 milliGRAM(s) Oral every 8 hours  insulin lispro (HumaLOG) corrective regimen sliding scale   SubCutaneous every 6 hours  insulin lispro Injectable (HumaLOG) 3 Unit(s) SubCutaneous three times a day before meals  insulin NPH human recombinant 5 Unit(s) SubCutaneous every 12 hours  pantoprazole    Tablet 40 milliGRAM(s) Oral before breakfast  predniSONE   Tablet 10 milliGRAM(s) Oral <User Schedule>  sodium chloride 0.9% lock flush 10 milliLiter(s) IV Push every 1 hour PRN  sodium chloride 0.9% lock flush 3 milliLiter(s) IV Push every 8 hours  tacrolimus 4 milliGRAM(s) Oral <User Schedule>  vancomycin    Solution 500 milliGRAM(s) Oral every 6 hours    Vitals:  T(C): 36.7 (20 @ 13:15), Max: 36.8 (20 @ 06:08)  HR: 123 (20 @ 13:15) (120 - 125)  BP: 95/68 (20 @ 13:15) (93/62 - 112/74)  BP(mean): --  RR: 18 (20 @ 13:15) (18 - 18)  SpO2: 95% (20 @ 13:15) (95% - 100%)    Daily     Daily Weight in k (17 Sep 2020 10:44)        I&O's Summary    16 Sep 2020 07:01  -  17 Sep 2020 07:00  --------------------------------------------------------  IN: 2595 mL / OUT: 995 mL / NET: 1600 mL    17 Sep 2020 07:01  -  17 Sep 2020 14:09  --------------------------------------------------------  IN: 720 mL / OUT: 500 mL / NET: 220 mL        Physical Exam:  Appearance: No Acute Distress  HEENT: JVP non elevated  Cardiovascular: Tachycardic, regular. Normal S1 and S2. III/VI combined systolic and diastolic murmur heard across precordium.  Respiratory: Clear to auscultation bilaterally  Gastrointestinal: mildly distended, mildly tender to palpation   Skin: no skin lesions  Neurologic: Non-focal  Extremities: trace LE edema, 2+ RUE edema, warm and well perfused  Psychiatry: A & O x 3, Mood & affect appropriate    Labs:                        8.5    7.34  )-----------( 93       ( 17 Sep 2020 09:26 )             29.0         138  |  99  |  61<H>  ----------------------------<  121<H>  3.8   |  29  |  1.31<H>    Ca    8.2<L>      17 Sep 2020 09:27  Phos  3.3       Mg     2.5         TPro  5.9<L>  /  Alb  2.7<L>  /  TBili  0.5  /  DBili  x   /  AST  54<H>  /  ALT  48<H>  /  AlkPhos  176<H>                      Total Cholesterol: --  LDL: --  HDL: --  T

## 2020-09-17 NOTE — PROGRESS NOTE ADULT - PROBLEM SELECTOR PLAN 3
Calorie count   cc/lowfiber diet  Glucerna 80 cc/hrx12 nocturnal feeds  9/17 dronabinol added for appetite

## 2020-09-17 NOTE — PROGRESS NOTE ADULT - ASSESSMENT
A/P:  70y M with history of heart transplant in 2/2018 admitted with autoimmune hemolytic anemia and dark stools requiring transfusion. Found to have c diff and abdominal distension on 8/23.  Pt developed Ileus w/ NGT placement and was made NPO.  Ileus resolving and TF & diet slowly being reintroduced.    TPN team was consulted to assist w/ management in pt who was NPO for a prolonged period of time.  Pt  tolerating a hybrid of TF & oral nutrition- Glucerna TF at GOAL given 12hrs qhs          Marinol added to help with appetite          d/w team decreasing overnight TF so that pt will have an appetite to eat more at meals  Pt initially presented w/ Acute Severe Protein-Calorie Malnutrition  TPN stopped 9/12/2020.          - HyperTg- improving w/ TPN stopped, continue to monitor   - Hyperglycemia- improving               Continue w/ NPH 5U q12h & Humolog 3U qmeal              FS w/ ISS as per CTU  - Strict Intake and Output- fluid overload improving             Lasix 40mg now q12h             Sheldon resolved  - HypoMg & HypoPhos- improving off tpn             Maintaining Mg levels will assist GI motility  - Elevated Alk Phos & Lfts- improving -continue to monitor  - Weights as per protocol  - Monitor  CMP, Mg, iCa, & Phos daily  - Check Prealbumin check weekly  - PICC w/ designated port for TPN, maintenance as per protocol  - Continue monitoring as per CTS, will follow with lobito, d/w CTS team    LEWIS StaffordC  [phone]  d/w Dr Tariq A/P:  70y M with history of heart transplant in 2/2018 admitted with autoimmune hemolytic anemia and dark stools requiring transfusion. Found to have c diff and abdominal distension on 8/23.  Pt developed Ileus w/ NGT placement and was made NPO.  Ileus resolving and TF & diet slowly being reintroduced.    TPN team was consulted to assist w/ management in pt who was NPO for a prolonged period of time.  Pt  tolerating a hybrid of TF & oral nutrition- Glucerna TF at GOAL given 12hrs qhs          Marinol added to help with appetite          d/w team decreasing overnight TF so that pt will have an appetite to eat more at meals  Pt initially presented w/ Acute Severe Protein-Calorie Malnutrition  TPN stopped 9/12/2020.          - HyperTg- elevated w/ TPN stopped, continue to monitor   - Hyperglycemia- improving               Continue w/ NPH 5U q12h & Humolog 3U qmeal              FS w/ ISS as per CTU  - Strict Intake and Output- fluid overload improving             Lasix 40mg now q12h             Sheldon resolved  - HypoMg & HypoPhos- improving off tpn             Maintaining Mg levels will assist GI motility  - Elevated Alk Phos & Lfts- improving -continue to monitor  - Weights as per protocol  - Monitor  CMP, Mg, iCa, & Phos daily  - Check Prealbumin check weekly  - PICC w/ designated port for TPN, maintenance as per protocol  - Continue monitoring as per CTS, will follow with lobito, d/w CTS team    LEWIS StaffordC  (B) [phone]  d/w Dr Tariq

## 2020-09-17 NOTE — PROGRESS NOTE ADULT - SUBJECTIVE AND OBJECTIVE BOX
Subjective ' hello I am tired"    VITAL SIGNS    Telemetry:      Vital Signs Last 24 Hrs  T(C): 36.8 (09-17-20 @ 06:08), Max: 36.8 (09-17-20 @ 06:08)  T(F): 98.3 (09-17-20 @ 06:08), Max: 98.3 (09-17-20 @ 06:08)  HR: 120 (09-17-20 @ 06:08) (114 - 123)  BP: 93/62 (09-17-20 @ 06:08) (93/62 - 112/74)  RR: 18 (09-17-20 @ 06:08) (16 - 18)  SpO2: 98% (09-17-20 @ 06:08) (98% - 100%)           09-16 @ 07:01  -  09-17 @ 07:00  --------------------------------------------------------  IN: 2595 mL / OUT: 995 mL / NET: 1600 mL    MEDICATIONS  (STANDING):  chlorhexidine 2% Cloths 1 Application(s) Topical <User Schedule>  dronabinol 5 milliGRAM(s) Oral <User Schedule>  furosemide   Injectable 40 milliGRAM(s) IV Push every 12 hours  heparin   Injectable 5000 Unit(s) SubCutaneous every 8 hours  hydrALAZINE 25 milliGRAM(s) Oral every 8 hours  insulin lispro (HumaLOG) corrective regimen sliding scale   SubCutaneous every 6 hours  insulin lispro Injectable (HumaLOG) 3 Unit(s) SubCutaneous three times a day before meals  insulin NPH human recombinant 5 Unit(s) SubCutaneous every 12 hours  pantoprazole    Tablet 40 milliGRAM(s) Oral before breakfast  predniSONE   Tablet 10 milliGRAM(s) Oral <User Schedule>  sodium chloride 0.9% lock flush 3 milliLiter(s) IV Push every 8 hours  tacrolimus 4 milliGRAM(s) Oral <User Schedule>  vancomycin    Solution 500 milliGRAM(s) Oral every 6 hours    MEDICATIONS  (PRN):  benzocaine 15 mG/menthol 3.6 mG (Sugar-Free) Lozenge 1 Lozenge Oral every 6 hours PRN Sore Throat  dextrose 40% Gel 15 Gram(s) Oral once PRN Blood Glucose LESS THAN 70 milliGRAM(s)/deciliter  glucagon  Injectable 1 milliGRAM(s) IntraMuscular once PRN Glucose LESS THAN 70 milligrams/deciliter  sodium chloride 0.9% lock flush 10 milliLiter(s) IV Push every 1 hour PRN Pre/post blood products, medications, blood draw, and to maintain line patency        CAPILLARY BLOOD GLUCOSE      POCT Blood Glucose.: 136 mg/dL (17 Sep 2020 06:04)  POCT Blood Glucose.: 137 mg/dL (16 Sep 2020 23:58)  POCT Blood Glucose.: 130 mg/dL (16 Sep 2020 16:51)  POCT Blood Glucose.: 152 mg/dL (16 Sep 2020 14:13)  POCT Blood Glucose.: 127 mg/dL (16 Sep 2020 11:10)                             PHYSICAL EXAM  Neurology: alert and oriented x 3, nonfocal, no gross deficits    CV :S1 S2 RRR    Sternal Wound :  Healed  sternum stable    Lungs:B/l breath sounds room air     Abdomen: soft, nontender, nondistended, positive bowel sounds, last bowel movement 9/17    : diaz              Extremities:   equal strength throughout  RUE +2 edema warm well perfused  4/5     B/lle warm well perfused + DP                                      Physical Therapy Rec:   Home  [  ]   Home w/ PT  [  ]  Rehab  [x  ]    Discussed with Cardiothoracic Team at AM rounds.

## 2020-09-17 NOTE — PROGRESS NOTE ADULT - PROBLEM SELECTOR PLAN 5
- Appropriate response to 1u pRBCs 9/10.  - Repeat hemolysis labs look better without intervention. Recheck haptoglobin and LDH  - Appreciate Heme input. Given severity of C.diff infection, unable to give higher dose steroids at this time, but will reassess as he improves.  - Case reviewed with Blood Bank Director, Dr. Tomlinson.  - G6PD level normal  - Will continue to assess for signs of hemolysis on tacrolimus

## 2020-09-17 NOTE — PROGRESS NOTE ADULT - ASSESSMENT
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 He was treated with Rituximab and high dose steroids. He is currently admitted with symptomatic anemia with Hgb initially of 6.6 requiring blood transfusions. Evaluation was not consistent with hemolysis. EGD/enteroscopy eventually revealed chronic gastritis and small bowel ectasias.  His course was complicated by development of severe leukopenia and acute respiratory distress and profound sinus tachycardia on 8/13 in setting of Klebsiella bacteremia of unclear origin (preceded EGD). A CT scan of the chest showed a possible infiltrate. While his bacteremia was treated, he subsequently developed C diff colitis with severe abdominal distention. He also developed worsening acute on chronic renal failure likely from volume overload which has resolved with diuretics. He has ongoing improvement in abdominal distention and leukocytosis. He was briefly on TPN but now tolerating enteral feeds though still requiring tube feeding. He continues to make progress.

## 2020-09-17 NOTE — PROGRESS NOTE ADULT - ASSESSMENT
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 s/p EGD with esophagitis and gastritis. Developed Klebsiella oxytoca bacteremia requiring transfer to CTU. Clinically improving, transferred to [facility], finished 10-day of ceftriaxone, however, developed severe C.diff colitis on Vancomycin 500mg q6h PO and IV Flagyl. He had RHC on 8/25 and found to have high filling pressure so transferred to CTU again.    #C.diff colitis with NORMAN- C.diff PCR detected (8/23). Repeat CT on 8/30 without evidence of toxic megacolon.   - clinically  has hit a plateau and without significant improvement but also without deterioration  tolerating minimal amounts of oral food  continue po vanco but decrease dose to 125mg QID to begin a taper  Will send repeat stool C.diff specimen- toxin negative will begin Vanco taper  Please repeat abdominal Xray  Asked stewardship program to investigate if Bezlotoxamab 10mg/kg as a single dose could be obtrained to help reduce recurrence of infection in this immunocompromised patient       #OI prophylaxis-  -Was previously receiving high dose steroids  -On IV cyclosporine now  -CMV viral load(8/17, 8/26): neg  -Valcyte and Bactrim d/c'ed on 8/17 due to leukopenia  -Galactomann<0.5, Fungitell<31  -Would repeat CMV viral load this week  -Will consider bactrim or Atovaquone for PCP ppx when able to tolerated PO    Gary Lujan MD  [phone]  After 5pm/weekends [phone]

## 2020-09-17 NOTE — PROGRESS NOTE ADULT - ASSESSMENT
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 Patient was treated w/ prednisone and Rituximab. Tacro was discontinued and patient was also transitioned to everolimus  Admitted  6/16-6/19  for hyperglycemia.  Reported to transplant team having one done black tarry stool-  instructed to  present to [facility] for hemolytic anemia. Patient has been following with Hematology outpatient.  Denies CP  N/V diarrhea SOB. (11 Aug 2020 20:08)  /11 Admitted to [facility] with symptomatic anemia  8/13 EGD for investigation of tarry stools  8/15 SB capsule: stomach & SB lesions, likely ulcers.  8/17 EGD/push enteroscopy w/ angioectasias/erosions in small bowel. Gastropathy and esophagitis. Ulcer seen on VCE most likely scope trauma.    8/18 VSS transferred back to floor.   8/20 VS 9.0/28.4 stable Gastric biopsy results LA Grade A esophagitis.  - Acute gastritis. Biopsies taken to r/o CMV, No ulceration seen despite finding on capsule endoscopy - likely 2/2 scope  trauma that has since resolved. Pathology pending.  8/21 VSS H/H  8.1/25.7  trending down will monitor prednisone taper 30 mg today. Procardia 120 initiated today RHC biopsy Monday.   8/22 VVS; Patient reports loose stools x 2-3 days with 1 episode today.  Stool sent for C-dif and GI PCR. Abdominal X-ray revealed: dilated loops of bowel. Abdomen distended.  Patient denies N/V. GI called to re-evaluate. Continue with current medication regimen.  Awaiting for upcoming cardiac biopsy on Monday 8/24.  8/23   vss    creat up to 2.28 ,  repeating CMP,  TTE ordered  Bladder scan   8/24   cardiac bx cancelled   elev creat  to 2.74   cardio renal cslt called,    + CDIF   inc vanco to 500 q6   ID following  8/25 VSS: RHC today as per transplant team; transfuse 2 units PRBC today as per Dr. Viramontes; continue vanco for cdiff; transfer patient to CTU after cath today   8/26. Dieretic challenge with good response   8/27: Downgraded to the floor then upgraded to the CTU again for concerning of abd distention   8/28 CT ABD & Pelvis reveals pancolitis  8/30: repeat CT scan of abd, gen surgery called to re-evaluated pt.  8/31: RUE duplex negative for DVT  9/4 NGT clamped, minimal residual. NGT removed. started sips   9/6 tolerating clear liquid diet  9/8 Bronch  9/9 1 prbc   9/10 increased tube feeds to 20cc/hr and tpn decreased to 63cc/hr from 125cc/hr   9/12 TPN discontinued  9/14 Diet advanced, tube feeds at 75% goal, central line removed and midline inserted.  9/14 pt completed approp 75 % of meal.   pt ambulated to the door this afternoon  9/15 Pt tolerating PO diet with supplemental tube feeds. calorie count iniated  9/16 VSS OOB in chair no acute distress transferred to 2 [facility] nocturnal tube feeds 80 cc/hrx12 - RUE + edema Tacro level 7.2  PT disposition  to rehab.  9/177 VSS flat affect dronabinol added appetite stimulant at goal with Glucerna nocturnal feeds.. Ralph removed this am

## 2020-09-17 NOTE — PROGRESS NOTE ADULT - PROBLEM SELECTOR PLAN 2
- Continue tacrolimus, goal will be ~10 as monotherapy (previously on everolimus). Level today not a true trough (checked shortly after administration) so will continue current dosing and recheck tomorrow   - Continue prednisone 10 mg daily, will not wean until confirming absence of hemolysis on tacrolimus   - Will need to resume statin and aspirin when stable.

## 2020-09-18 LAB
ANION GAP SERPL CALC-SCNC: 11 MMOL/L — SIGNIFICANT CHANGE UP (ref 5–17)
BUN SERPL-MCNC: 55 MG/DL — HIGH (ref 7–23)
CALCIUM SERPL-MCNC: 8.2 MG/DL — LOW (ref 8.4–10.5)
CHLORIDE SERPL-SCNC: 98 MMOL/L — SIGNIFICANT CHANGE UP (ref 96–108)
CO2 SERPL-SCNC: 27 MMOL/L — SIGNIFICANT CHANGE UP (ref 22–31)
CREAT SERPL-MCNC: 1.29 MG/DL — SIGNIFICANT CHANGE UP (ref 0.5–1.3)
GLUCOSE BLDC GLUCOMTR-MCNC: 109 MG/DL — HIGH (ref 70–99)
GLUCOSE BLDC GLUCOMTR-MCNC: 110 MG/DL — HIGH (ref 70–99)
GLUCOSE BLDC GLUCOMTR-MCNC: 125 MG/DL — HIGH (ref 70–99)
GLUCOSE BLDC GLUCOMTR-MCNC: 137 MG/DL — HIGH (ref 70–99)
GLUCOSE BLDC GLUCOMTR-MCNC: 138 MG/DL — HIGH (ref 70–99)
GLUCOSE SERPL-MCNC: 99 MG/DL — SIGNIFICANT CHANGE UP (ref 70–99)
HCT VFR BLD CALC: 29 % — LOW (ref 39–50)
HGB BLD-MCNC: 8.6 G/DL — LOW (ref 13–17)
LDH SERPL L TO P-CCNC: 527 U/L — HIGH (ref 50–242)
MAGNESIUM SERPL-MCNC: 2.4 MG/DL — SIGNIFICANT CHANGE UP (ref 1.6–2.6)
MCHC RBC-ENTMCNC: 29.7 GM/DL — LOW (ref 32–36)
MCHC RBC-ENTMCNC: 32.2 PG — SIGNIFICANT CHANGE UP (ref 27–34)
MCV RBC AUTO: 108.6 FL — HIGH (ref 80–100)
NRBC # BLD: 2 /100 WBCS — HIGH (ref 0–0)
PLATELET # BLD AUTO: 100 K/UL — LOW (ref 150–400)
POTASSIUM SERPL-MCNC: 4.2 MMOL/L — SIGNIFICANT CHANGE UP (ref 3.5–5.3)
POTASSIUM SERPL-SCNC: 4.2 MMOL/L — SIGNIFICANT CHANGE UP (ref 3.5–5.3)
RBC # BLD: 2.67 M/UL — LOW (ref 4.2–5.8)
RBC # FLD: 26.8 % — HIGH (ref 10.3–14.5)
SODIUM SERPL-SCNC: 136 MMOL/L — SIGNIFICANT CHANGE UP (ref 135–145)
TACROLIMUS SERPL-MCNC: 7.4 NG/ML — SIGNIFICANT CHANGE UP
WBC # BLD: 7.9 K/UL — SIGNIFICANT CHANGE UP (ref 3.8–10.5)
WBC # FLD AUTO: 7.9 K/UL — SIGNIFICANT CHANGE UP (ref 3.8–10.5)

## 2020-09-18 PROCEDURE — 99232 SBSQ HOSP IP/OBS MODERATE 35: CPT

## 2020-09-18 PROCEDURE — 99233 SBSQ HOSP IP/OBS HIGH 50: CPT

## 2020-09-18 RX ADMIN — SODIUM CHLORIDE 3 MILLILITER(S): 9 INJECTION INTRAMUSCULAR; INTRAVENOUS; SUBCUTANEOUS at 06:12

## 2020-09-18 RX ADMIN — SODIUM CHLORIDE 3 MILLILITER(S): 9 INJECTION INTRAMUSCULAR; INTRAVENOUS; SUBCUTANEOUS at 23:09

## 2020-09-18 RX ADMIN — Medication 40 MILLIGRAM(S): at 06:12

## 2020-09-18 RX ADMIN — Medication 25 MILLIGRAM(S): at 23:12

## 2020-09-18 RX ADMIN — HUMAN INSULIN 5 UNIT(S): 100 INJECTION, SUSPENSION SUBCUTANEOUS at 06:13

## 2020-09-18 RX ADMIN — PANTOPRAZOLE SODIUM 40 MILLIGRAM(S): 20 TABLET, DELAYED RELEASE ORAL at 06:13

## 2020-09-18 RX ADMIN — HUMAN INSULIN 5 UNIT(S): 100 INJECTION, SUSPENSION SUBCUTANEOUS at 18:24

## 2020-09-18 RX ADMIN — TACROLIMUS 4 MILLIGRAM(S): 5 CAPSULE ORAL at 20:10

## 2020-09-18 RX ADMIN — CHLORHEXIDINE GLUCONATE 1 APPLICATION(S): 213 SOLUTION TOPICAL at 08:31

## 2020-09-18 RX ADMIN — Medication 125 MILLIGRAM(S): at 18:24

## 2020-09-18 RX ADMIN — Medication 125 MILLIGRAM(S): at 12:14

## 2020-09-18 RX ADMIN — Medication 125 MILLIGRAM(S): at 06:12

## 2020-09-18 RX ADMIN — Medication 10 MILLIGRAM(S): at 08:06

## 2020-09-18 RX ADMIN — SODIUM CHLORIDE 3 MILLILITER(S): 9 INJECTION INTRAMUSCULAR; INTRAVENOUS; SUBCUTANEOUS at 13:32

## 2020-09-18 RX ADMIN — Medication 3 UNIT(S): at 08:05

## 2020-09-18 RX ADMIN — TACROLIMUS 4 MILLIGRAM(S): 5 CAPSULE ORAL at 08:06

## 2020-09-18 RX ADMIN — HEPARIN SODIUM 5000 UNIT(S): 5000 INJECTION INTRAVENOUS; SUBCUTANEOUS at 06:13

## 2020-09-18 RX ADMIN — Medication 5 MILLIGRAM(S): at 12:14

## 2020-09-18 RX ADMIN — HEPARIN SODIUM 5000 UNIT(S): 5000 INJECTION INTRAVENOUS; SUBCUTANEOUS at 23:12

## 2020-09-18 RX ADMIN — Medication 25 MILLIGRAM(S): at 13:35

## 2020-09-18 RX ADMIN — HEPARIN SODIUM 5000 UNIT(S): 5000 INJECTION INTRAVENOUS; SUBCUTANEOUS at 13:35

## 2020-09-18 RX ADMIN — Medication 40 MILLIGRAM(S): at 18:27

## 2020-09-18 RX ADMIN — Medication 5 MILLIGRAM(S): at 06:12

## 2020-09-18 NOTE — PROGRESS NOTE ADULT - PROBLEM SELECTOR PLAN 2
Continue closely monitoring H&H and transfuse prn.   Hx of Autoimmune hemolytic anemia, patient with (+) Igg Jacky,   heparin  Injectable 5000 Unit(s) SubCutaneous every 8 hours for VTE prophylaxis

## 2020-09-18 NOTE — CHART NOTE - NSCHARTNOTEFT_GEN_A_CORE
Nutrition Follow Up Note    Patient seen for: Calorie count review.     Source:  RN, NP, PCA, Medical record, Transplant team.     Chart reviewed, events noted. 70 year old male with PMHx of NICM, s/p HM2 LVAD and OHT in 2018, with known coronary fistula. Recent admission for hemolytic anemia, now admitted for hyperglycemia and GIB. EGD and capsule study negative. Pt now C-Diff colitis w/o toxic megacolon. Required TPN for acute severe malnutrition in the setting of Colitis and ileus.  TPN has now been discontinued since .   Pt receiving Low fiber diet + nocturnal EN via NGT.     Diet: Consistent CHO, Low fiber, Glucerna x2 daily + EN via NGT  Glucerna 1.5 @ 80ml/env19dnm provided nocturnally.  EN provides: 1440kcals and 79.2gm protein (19kcals/kg and 1.0gm pro/kg based on dosing Wt: 75.4kg)  EN provision: 100% x3 days. Pt transitioned to nocturnal EN on .     3 Day Calorie count Review.   Day 1: 714kcals and 35gm protein   Day 2: 50 calories, 0 protein   Day 3: 322kcals and 17gm protein   3 Day average: 260kcals and 17gm protein.     Will review calorie count with team. At this time due to significantly poor PO intake, at this time EN via NGT remains indicated.     Patient reports: Pt seen in bed. Started on Marinol as an appetite stimulant.   Pt's PO intake remains severely inadequate. Pt reports being somewhat afraid to eat vs limited appetite vs abdominal cramping throughout the day.   Pt is accepting Glucerna x2 daily, will increase to 3 daily. Pt is ordering food preferences, receiving foods from home all to aid in increased PO intake.   Pt does at times require meal time feeding assistance; RN aware.     Pt continues to have loose BM's in the setting of C-Diff. Had 4 BM on , and 2 thus far today.   BG levels are acceptable at this time.      PO intake: Poor    Daily Weight in k (-), Weight in k.1 (-16), Weight in k.7 (09-15), Weight in k.9 ()    Drug Dosing Weight  Weight (kg): 75.2 (17 Aug 2020 14:29)  BMI (kg/m2): 26 (17 Aug 2020 14:29)      Pertinent Medications: MEDICATIONS  (STANDING):  chlorhexidine 2% Cloths 1 Application(s) Topical <User Schedule>  dextrose 5%. 1000 milliLiter(s) (50 mL/Hr) IV Continuous <Continuous>  dextrose 50% Injectable 12.5 Gram(s) IV Push once  dextrose 50% Injectable 25 Gram(s) IV Push once  dextrose 50% Injectable 25 Gram(s) IV Push once  dronabinol 5 milliGRAM(s) Oral <User Schedule>  furosemide   Injectable 40 milliGRAM(s) IV Push every 12 hours  heparin   Injectable 5000 Unit(s) SubCutaneous every 8 hours  hydrALAZINE 25 milliGRAM(s) Oral every 8 hours  insulin lispro (HumaLOG) corrective regimen sliding scale   SubCutaneous every 6 hours  insulin lispro Injectable (HumaLOG) 3 Unit(s) SubCutaneous three times a day before meals  insulin NPH human recombinant 5 Unit(s) SubCutaneous every 12 hours  pantoprazole    Tablet 40 milliGRAM(s) Oral before breakfast  predniSONE   Tablet 10 milliGRAM(s) Oral <User Schedule>  sodium chloride 0.9% lock flush 3 milliLiter(s) IV Push every 8 hours  tacrolimus 4 milliGRAM(s) Oral <User Schedule>  vancomycin    Solution 125 milliGRAM(s) Oral every 6 hours    MEDICATIONS  (PRN):  benzocaine 15 mG/menthol 3.6 mG (Sugar-Free) Lozenge 1 Lozenge Oral every 6 hours PRN Sore Throat  dextrose 40% Gel 15 Gram(s) Oral once PRN Blood Glucose LESS THAN 70 milliGRAM(s)/deciliter  glucagon  Injectable 1 milliGRAM(s) IntraMuscular once PRN Glucose LESS THAN 70 milligrams/deciliter  sodium chloride 0.9% lock flush 10 milliLiter(s) IV Push every 1 hour PRN Pre/post blood products, medications, blood draw, and to maintain line patency      LABS:    @ 09:27: Sodium 138, Potassium 3.8, Calcium 8.2<L>, Magnesium 2.5, Phosphorus 3.3, BUN 61<H>, Creatinine 1.31<H>, Glucose 121<H>, Alk Phos --, ALT/SGPT --, AST/SGOT --, Albumin --, Prealbumin --, Total Bilirubin --, Hemoglobin --, Hematocrit --, Ferritin --, C-Reactive Protein --, Creatine Kinase <<27>  09 @ 09:26: Sodium --, Potassium --, Calcium --, Magnesium --, Phosphorus --, BUN --, Creatinine --, Glucose --, Alk Phos --, ALT/SGPT --, AST/SGOT --, Albumin --, Prealbumin --, Total Bilirubin --, Hemoglobin 8.5<L>, Hematocrit 29.0<L>, Ferritin --, C-Reactive Protein --, Creatine Kinase <<27>      Finger Sticks:  POCT Blood Glucose.: 137 mg/dL ( @ 06:05)  POCT Blood Glucose.: 106 mg/dL ( @ 23:56)  POCT Blood Glucose.: 112 mg/dL ( @ 17:01)  POCT Blood Glucose.: 127 mg/dL ( @ 12:25)      Skin per  nursing documentation: intact  Edema: + 1 generalized, +2 tara foot and tara ankle. +4 right arm and right wrist.     Estimated Needs: based on dosing Wt: 75.2 Kg,   Energy: (25-30kcal/kg): 1880-2256kcal   Protein:  (1.4-1.6g protein/kg): 105-135g protein    Previous Nutrition Diagnosis: altered nutrition lab values  Nutrition Diagnosis is: defer at this time     Previous Nutrition Diagnosis: inadequate oral intake  Nutrition Diagnosis is: on going at this time.     Previous Nutrition Diagnosis: acute severe protein calorie malnutrition  Nutrition Diagnosis: remains appropriate, ongoing diet advancement     New Nutrition Diagnosis: None     Recommended Interventions:   1. Continue consistent CHO, Low Fiber,  2. would increase Glucerna to 3 x daily to further supplement PO Intake   3. Continue  Glucerna 1.5 @80ml/[id number] Provided nocturnally. Provides: 1440kcals and 79gm protein.  (19kcals/kg and 1.0gm pro/kg based on dosing Wt: 75.4kg). This represents 75% of estimated nutrient needs   4. New Calorie count initiated to further monitor progress on PO intake. -. RD to review upon follow up   5. Continue Marinol as able to stimulant appetite.   6. Trend GI tolerance   7. Trend BG levels, renal indices, LFT's, electrolytes and triglycerides     Monitoring and Evaluation:   Continue to monitor nutritional intake, tolerance to diet prescription, weights, labs, skin integrity  RD remains available upon request and will follow up per protocol  Gabbi Flowers RD, CDN, [facility] Pager #[phone].

## 2020-09-18 NOTE — PROGRESS NOTE ADULT - PROBLEM SELECTOR PLAN 5
- Appropriate response to 1u pRBCs 9/10.  - No further evidence of hemolysis, LDH improving and haptolobin normal 9/18  - Appreciate Heme input. Given severity of C.diff infection, unable to give higher dose steroids at this time, but will reassess as he improves.  - Case reviewed with Blood Bank Director, Dr. Tomlinson.  - G6PD level normal

## 2020-09-18 NOTE — PROGRESS NOTE ADULT - ASSESSMENT
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 Patient was treated w/ prednisone and Rituximab. Tacro was discontinued and patient was also transitioned to everolimus  Admitted  6/16-6/19  for hyperglycemia.  Reported to transplant team having one done black tarry stool-  instructed to  present to [facility] for hemolytic anemia. Patient has been following with Hematology outpatient.  Denies CP  N/V diarrhea SOB. (11 Aug 2020 20:08)  /11 Admitted to [facility] with symptomatic anemia  8/13 EGD for investigation of tarry stools  8/15 SB capsule: stomach & SB lesions, likely ulcers.  8/17 EGD/push enteroscopy w/ angioectasias/erosions in small bowel. Gastropathy and esophagitis. Ulcer seen on VCE most likely scope trauma.    8/18 VSS transferred back to floor.   8/20 VS 9.0/28.4 stable Gastric biopsy results LA Grade A esophagitis.  - Acute gastritis. Biopsies taken to r/o CMV, No ulceration seen despite finding on capsule endoscopy - likely 2/2 scope  trauma that has since resolved. Pathology pending.  8/21 VSS H/H  8.1/25.7  trending down will monitor prednisone taper 30 mg today. Procardia 120 initiated today RHC biopsy Monday.   8/22 VVS; Patient reports loose stools x 2-3 days with 1 episode today.  Stool sent for C-dif and GI PCR. Abdominal X-ray revealed: dilated loops of bowel. Abdomen distended.  Patient denies N/V. GI called to re-evaluate. Continue with current medication regimen.  Awaiting for upcoming cardiac biopsy on Monday 8/24.  8/23   vss    creat up to 2.28 ,  repeating CMP,  TTE ordered  Bladder scan   8/24   cardiac bx cancelled   elev creat  to 2.74   cardio renal cslt called,    + CDIF   inc vanco to 500 q6   ID following  8/25 VSS: RHC today as per transplant team; transfuse 2 units PRBC today as per Dr. Viramontes; continue vanco for cdiff; transfer patient to CTU after cath today   8/26. Dieretic challenge with good response   8/27: Downgraded to the floor then upgraded to the CTU again for concerning of abd distention   8/28 CT ABD & Pelvis reveals pancolitis  8/30: repeat CT scan of abd, gen surgery called to re-evaluated pt.  8/31: RUE duplex negative for DVT  9/4 NGT clamped, minimal residual. NGT removed. started sips   9/6 tolerating clear liquid diet  9/8 Bronch  9/9 1 prbc   9/10 increased tube feeds to 20cc/hr and tpn decreased to 63cc/hr from 125cc/hr   9/12 TPN discontinued  9/14 Diet advanced, tube feeds at 75% goal, central line removed and midline inserted.  9/14 pt completed approp 75 % of meal.   pt ambulated to the door this afternoon  9/15 Pt tolerating PO diet with supplemental tube feeds. calorie count iniated  9/16 VSS OOB in chair no acute distress transferred to 2 [facility] nocturnal tube feeds 80 cc/hrx12 - RUE + edema Tacro level 7.2  PT disposition  to rehab.  9/177 VSS flat affect dronabinol added appetite stimulant at goal with Glucerna nocturnal feeds.. Rosas removed this am  9/18 VSS Remains on Glucerna TF, failed calorie count, glucerna increased to 3 daily. Loose BM Improving--continue on Vanco 125mg QID taper as per ID.

## 2020-09-18 NOTE — PROGRESS NOTE ADULT - SUBJECTIVE AND OBJECTIVE BOX
Patient seen lying comfortably in bed with ALYSSIA feed in place. Patient denies abdominal pain, nausea, vomiting. Patient states x1 Loose BM this morning (improving). Right upper extremity swelling remains the same~ ultrasound done yesterday.     VITAL SIGNS    Telemetry: -120  Vital Signs Last 24 Hrs  T(C): 36.7 (20 @ 06:09), Max: 36.7 (20 @ 13:15)  T(F): 98 (20 @ 06:09), Max: 98.1 (20 @ 20:29)  HR: 120 (20 @ 06:09) (119 - 125)  BP: 96/65 (20 @ 06:09) (95/66 - 111/77)  RR: 18 (20 @ 06:09) (18 - 18)  SpO2: 97% (20 @ 06:09) (95% - 97%)             @ 07:01  -   @ 07:00  --------------------------------------------------------  IN: 1800 mL / OUT: 1400 mL / NET: 400 mL     @ 07:01  -   @ 09:22  --------------------------------------------------------  IN: 0 mL / OUT: 200 mL / NET: -200 mL       Daily     Daily Weight in k (17 Sep 2020 10:44)  Admit Wt: Drug Dosing Weight  Height (cm): 170.18 (17 Aug 2020 14:29)  Weight (kg): 75.2 (17 Aug 2020 14:29)  BMI (kg/m2): 26 (17 Aug 2020 14:29)  BSA (m2): 1.87 (17 Aug 2020 14:29)      CAPILLARY BLOOD GLUCOSE      POCT Blood Glucose.: 137 mg/dL (18 Sep 2020 06:05)  POCT Blood Glucose.: 106 mg/dL (17 Sep 2020 23:56)  POCT Blood Glucose.: 112 mg/dL (17 Sep 2020 17:01)  POCT Blood Glucose.: 127 mg/dL (17 Sep 2020 12:25)          MEDICATIONS  benzocaine 15 mG/menthol 3.6 mG (Sugar-Free) Lozenge 1 Lozenge Oral every 6 hours PRN  chlorhexidine 2% Cloths 1 Application(s) Topical <User Schedule>  dextrose 40% Gel 15 Gram(s) Oral once PRN  dextrose 5%. 1000 milliLiter(s) IV Continuous <Continuous>  dextrose 50% Injectable 12.5 Gram(s) IV Push once  dextrose 50% Injectable 25 Gram(s) IV Push once  dextrose 50% Injectable 25 Gram(s) IV Push once  dronabinol 5 milliGRAM(s) Oral <User Schedule>  furosemide   Injectable 40 milliGRAM(s) IV Push every 12 hours  glucagon  Injectable 1 milliGRAM(s) IntraMuscular once PRN  heparin   Injectable 5000 Unit(s) SubCutaneous every 8 hours  hydrALAZINE 25 milliGRAM(s) Oral every 8 hours  insulin lispro (HumaLOG) corrective regimen sliding scale   SubCutaneous every 6 hours  insulin lispro Injectable (HumaLOG) 3 Unit(s) SubCutaneous three times a day before meals  insulin NPH human recombinant 5 Unit(s) SubCutaneous every 12 hours  pantoprazole    Tablet 40 milliGRAM(s) Oral before breakfast  predniSONE   Tablet 10 milliGRAM(s) Oral <User Schedule>  sodium chloride 0.9% lock flush 3 milliLiter(s) IV Push every 8 hours  sodium chloride 0.9% lock flush 10 milliLiter(s) IV Push every 1 hour PRN  tacrolimus 4 milliGRAM(s) Oral <User Schedule>  vancomycin    Solution 125 milliGRAM(s) Oral every 6 hours      >>> <<<  PHYSICAL EXAM  Subjective: " Im tired."   Neurology: alert and oriented x 3, nonfocal, no gross deficits  CV : ST, RRR S1S2   Sternal Wound :  CDI , Stable  Lungs: CTA  Abdomen: soft, NT, ND, (+ )BM   :  voiding  Extremities: Right Upper Extremity swelling, non erythematous  bilateral +2 LE edema bilaterally, +DP, No calf tenderness     LABS      138  |  99  |  61<H>  ----------------------------<  121<H>  3.8   |  29  |  1.31<H>    Ca    8.2<L>      17 Sep 2020 09:27  Phos  3.3     -  Mg     2.5                                        8.5    7.34  )-----------( 93       ( 17 Sep 2020 09:26 )             29.0                 PAST MEDICAL & SURGICAL HISTORY:  H/O hemolytic anemia    H/O autoimmune hemolytic anemia    Knee pain, right    HLD (hyperlipidemia)    Former smoker    DVT of upper extremity (deep vein thrombosis)    Hepatitis C virus    GIB (gastrointestinal bleeding)    Ventricular fibrillation  s/p AICD    PAF (paroxysmal atrial fibrillation)  on xarelto    Non-Ischemic Cardiomyopathy    SVT (Supraventricular Tachycardia)    HTN    CHF (Congestive Heart Failure)    S/P right heart catheterization  biopsy multiple    H/O heart transplant  2018    Status post left hip replacement    History of Prior Ablation Treatment  for afib    AICD (Automatic Cardioverter/Defibrillator) Present  [employer] with 1 [employer] lead09- explanted and replaced with [employer] 2 leads on 09

## 2020-09-18 NOTE — PROGRESS NOTE ADULT - SUBJECTIVE AND OBJECTIVE BOX
INFECTIOUS DISEASES FOLLOW UP-- Sujey Lujan  [phone]    This is a follow up note for this  70yMale with  Anemia  s/p OHTx  severe C.diff colitis with slow resolution        ROS:  CONSTITUTIONAL:  No fever,   CARDIOVASCULAR:  No chest pain or palpitations  RESPIRATORY:  No dyspnea  GASTROINTESTINAL:  No nausea poor appetite  GENITOURINARY:  No dysuria  NEUROLOGIC:  No headache,     Allergies    No Known Allergies    Intolerances        ANTIBIOTICS/RELEVANT:  antimicrobials  vancomycin    Solution 125 milliGRAM(s) Oral every 6 hours    immunologic:  tacrolimus 4 milliGRAM(s) Oral <User Schedule>    OTHER:  benzocaine 15 mG/menthol 3.6 mG (Sugar-Free) Lozenge 1 Lozenge Oral every 6 hours PRN  chlorhexidine 2% Cloths 1 Application(s) Topical <User Schedule>  dextrose 40% Gel 15 Gram(s) Oral once PRN  dextrose 5%. 1000 milliLiter(s) IV Continuous <Continuous>  dextrose 50% Injectable 12.5 Gram(s) IV Push once  dextrose 50% Injectable 25 Gram(s) IV Push once  dextrose 50% Injectable 25 Gram(s) IV Push once  dronabinol 5 milliGRAM(s) Oral <User Schedule>  furosemide   Injectable 40 milliGRAM(s) IV Push every 12 hours  glucagon  Injectable 1 milliGRAM(s) IntraMuscular once PRN  heparin   Injectable 5000 Unit(s) SubCutaneous every 8 hours  hydrALAZINE 25 milliGRAM(s) Oral every 8 hours  insulin lispro (HumaLOG) corrective regimen sliding scale   SubCutaneous every 6 hours  insulin lispro Injectable (HumaLOG) 3 Unit(s) SubCutaneous three times a day before meals  insulin NPH human recombinant 5 Unit(s) SubCutaneous every 12 hours  pantoprazole    Tablet 40 milliGRAM(s) Oral before breakfast  predniSONE   Tablet 10 milliGRAM(s) Oral <User Schedule>  sodium chloride 0.9% lock flush 10 milliLiter(s) IV Push every 1 hour PRN  sodium chloride 0.9% lock flush 3 milliLiter(s) IV Push every 8 hours      Objective:  Vital Signs Last 24 Hrs  T(C): 36.7 (18 Sep 2020 13:36), Max: 36.7 (17 Sep 2020 20:29)  T(F): 98.1 (18 Sep 2020 13:36), Max: 98.1 (17 Sep 2020 20:29)  HR: 112 (18 Sep 2020 18:23) (112 - 123)  BP: 108/70 (18 Sep 2020 18:23) (93/63 - 108/73)  BP(mean): --  RR: 16 (18 Sep 2020 13:36) (16 - 18)  SpO2: 95% (18 Sep 2020 13:36) (95% - 97%)    PHYSICAL EXAM:  Constitutional:no acute distress but frail and still not eating very much by mouth  Eyes:WALT, EOMI  Ear/Nose/Throat: no oral lesions, 	  Respiratory: clear BL  Cardiovascular: S1S2  Gastrointestinal:soft,less distended  Extremities:RUE remains with marked swelling diffusely  No Lymphadenopathy  IV sites not inflammed.    LABS:                        8.6    7.90  )-----------( 100      ( 18 Sep 2020 09:40 )             29.0     09-18    136  |  98  |  55<H>  ----------------------------<  99  4.2   |  27  |  1.29    Ca    8.2<L>      18 Sep 2020 09:50  Phos  3.3     09-17  Mg     2.4     09-18            MICROBIOLOGY:  COVID-19 PCR: NotDetec: Testing is performed using polymerase chain reaction (PCR) or  transcription mediated amplification (TMA). This COVID-19 (SARS-CoV-2)  nucleic acid amplification test was validated by [employer] and is  in use under the FDA Emergency Use Authorization (EUA) for clinical labs  CLIA-certified to perform high complexity testing. Test results should be  correlated with clinical presentation, patient history, and epidemiology. (09.15.20 @ 13:43)              RECENT CULTURES:      RADIOLOGY & ADDITIONAL STUDIES:    < from: VA Duplex Upper Ext Vein Scan, Right (09.17.20 @ 19:58) >  IMPRESSION:  No evidence of right upper extremity deep venous thrombosis.    < end of copied text >

## 2020-09-18 NOTE — PROGRESS NOTE ADULT - ASSESSMENT
A/P:  70y M with history of heart transplant in 2/2018 admitted with autoimmune hemolytic anemia and dark stools requiring transfusion. Found to have c diff and abdominal distension on 8/23.  Pt developed Ileus w/ NGT placement and was made NPO.  Ileus resolving and TF & diet slowly being reintroduced.    TPN team was consulted to assist w/ management in pt who was NPO for a prolonged period of time.    Pt tolerating a hybrid of TF & oral nutrition- Glucerna TF 80cc/h given 12hrs qhs          Marinol added to help with appetite          Calorie Count noted poor PO intake-               plan for another 3day calorie count & RD will reeval on monday          continue to encourage high quality proteins  Pt initially presented w/ Acute Severe Protein-Calorie Malnutrition  TPN stopped 9/12/2020.          - HyperTg- elevated w/ TPN stopped, continue to monitor   - Hyperglycemia- improving               Continue w/ NPH 5U q12h & Humolog 3U qmeal              FS w/ ISS as per CTU  - Strict Intake and Output- fluid overload improving             Lasix 40mg now q12h             Sheldon resolved  - HypoMg & HypoPhos- improving off tpn             Maintaining Mg levels will assist GI motility  - Elevated Alk Phos & LFTs- improving -continue to monitor             elevated LDH noted- primary team monitoring  - Weights as per protocol  - Monitor  CMP, Mg, iCa, & Phos daily  - Check Prealbumin check weekly  - PICC w/ designated port for TPN, maintenance as per protocol  - Continue monitoring as per CTS, will follow with you, d/w CTS team    SHELLY Stafford-C  B) [phone]  d/w Dr Tariq A/P:  70y M with history of heart transplant in 2/2018 admitted with autoimmune hemolytic anemia and dark stools requiring transfusion. Found to have c diff and abdominal distension on 8/23.  Pt developed Ileus w/ NGT placement and was made NPO.  Ileus resolving and TF & diet slowly being reintroduced.    TPN team was consulted to assist w/ management in pt who was NPO for a prolonged period of time.    Pt tolerating a hybrid of TF & oral nutrition- Glucerna TF 80cc/h given 12hrs qhs          Marinol added to help with appetite          Calorie Count noted poor PO intake-               plan for another 3day calorie count & RD will reeval on monday          continue to encourage high quality proteins  Pt initially presented w/ Acute Severe Protein-Calorie Malnutrition  TPN stopped 9/12/2020.          - HyperTg- elevated w/ TPN stopped, continue to monitor   - Hyperglycemia- improving               Continue w/ NPH 5U q12h & Humolog 3U qmeal              FS w/ ISS as per CTU  - Strict Intake and Output- fluid overload improving             Lasix 40mg now q12h             Sheldon resolved  - HypoMg & HypoPhos- improving off tpn             Maintaining Mg levels will assist GI motility  - Elevated Alk Phos & LFTs- improving -continue to monitor             elevated LDH noted- primary team monitoring  - Weights as per protocol  - Monitor  CMP, Mg, iCa, & Phos daily  - Check Prealbumin check weekly  - PICC w/ designated port for TPN, maintenance as per protocol  - Continue monitoring as per CTS, remain available as requested, d/w CTS team    SHELLY Stafford-C  B) [phone]  d/w Dr Tariq

## 2020-09-18 NOTE — PROGRESS NOTE ADULT - PROBLEM SELECTOR PLAN 7
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

## 2020-09-18 NOTE — PROGRESS NOTE ADULT - PROBLEM SELECTOR PLAN 4
Patient with ileus in setting of C.Diff- illeus resolved with bowel rest  TPN d/c   Gen  surgery Following no surgical intervention  CC/ low fiber diet  Nocturnal Glucerna tube feeds via Keofeed  Vancomycin PO  ID following

## 2020-09-18 NOTE — PROGRESS NOTE ADULT - ASSESSMENT
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 He was treated with Rituximab and high dose steroids. He is currently admitted with symptomatic anemia with Hgb initially of 6.6 requiring blood transfusions. Evaluation was not consistent with hemolysis. EGD/enteroscopy eventually revealed chronic gastritis and small bowel ectasias.  His course was complicated by development of severe leukopenia and acute respiratory distress and profound sinus tachycardia on 8/13 in setting of Klebsiella bacteremia of unclear origin (preceded EGD). A CT scan of the chest showed a possible infiltrate. While his bacteremia was treated, he subsequently developed C diff colitis with severe abdominal distention. He also developed worsening acute on chronic renal failure likely from volume overload which has resolved with diuretics. He has ongoing improvement in abdominal distention and leukocytosis. He was briefly on TPN but now tolerating enteral feeds though still requiring tube feeding to meet caloric needs. He continues to make progress.

## 2020-09-18 NOTE — PROGRESS NOTE ADULT - PROBLEM SELECTOR PLAN 2
- Continue tacrolimus, goal will be ~10 as monotherapy (previously on everolimus). Will monitor today and increase tomorrow if < 8  - Continue prednisone 10 mg daily, will not wean until confirming absence of hemolysis on tacrolimus   - Will need to resume statin and aspirin when stable.

## 2020-09-18 NOTE — PROGRESS NOTE ADULT - ASSESSMENT
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 s/p EGD with esophagitis and gastritis. Developed Klebsiella oxytoca bacteremia requiring transfer to CTU. Clinically improving, transferred to [facility], finished 10-day of ceftriaxone, however, developed severe C.diff colitis on Vancomycin 500mg q6h PO and IV Flagyl. He had RHC on 8/25 and found to have high filling pressure so transferred to CTU again.    #C.diff colitis with NORMAN- C.diff PCR detected (8/23). Repeat CT on 8/30 without evidence of toxic megacolon.   - clinically  has hit a plateau and without significant improvement but also without deterioration  tolerating minimal amounts of oral food  continue po vanco but decrease dose to 125mg QID to begin a taper  Please repeat abdominal Xray   start Marinol to improve appetite  Asked stewardship program to investigate if Bezlotoxamab 10mg/kg as a single dose could be obtrained to help reduce recurrence of infection in this immunocompromised patient       #OI prophylaxis-  -Was previously receiving high dose steroids  -On IV cyclosporine now  -CMV viral load(8/17, 8/26): neg  -Valcyte and Bactrim d/c'ed on 8/17 due to leukopenia  -Galactomann<0.5, Fungitell<31  -Would repeat CMV viral load this week  -Will consider bactrim or Atovaquone for PCP ppx when able to tolerated PO    Gary Lujan MD  [phone]  After 5pm/weekends [phone]

## 2020-09-18 NOTE — PROGRESS NOTE ADULT - SUBJECTIVE AND OBJECTIVE BOX
Subjective:  No overnight events. Continues to endorse poor appetite    Medications:  benzocaine 15 mG/menthol 3.6 mG (Sugar-Free) Lozenge 1 Lozenge Oral every 6 hours PRN  chlorhexidine 2% Cloths 1 Application(s) Topical <User Schedule>  dextrose 40% Gel 15 Gram(s) Oral once PRN  dextrose 5%. 1000 milliLiter(s) IV Continuous <Continuous>  dextrose 50% Injectable 12.5 Gram(s) IV Push once  dextrose 50% Injectable 25 Gram(s) IV Push once  dextrose 50% Injectable 25 Gram(s) IV Push once  dronabinol 5 milliGRAM(s) Oral <User Schedule>  furosemide   Injectable 40 milliGRAM(s) IV Push every 12 hours  glucagon  Injectable 1 milliGRAM(s) IntraMuscular once PRN  heparin   Injectable 5000 Unit(s) SubCutaneous every 8 hours  hydrALAZINE 25 milliGRAM(s) Oral every 8 hours  insulin lispro (HumaLOG) corrective regimen sliding scale   SubCutaneous every 6 hours  insulin lispro Injectable (HumaLOG) 3 Unit(s) SubCutaneous three times a day before meals  insulin NPH human recombinant 5 Unit(s) SubCutaneous every 12 hours  pantoprazole    Tablet 40 milliGRAM(s) Oral before breakfast  predniSONE   Tablet 10 milliGRAM(s) Oral <User Schedule>  sodium chloride 0.9% lock flush 10 milliLiter(s) IV Push every 1 hour PRN  sodium chloride 0.9% lock flush 3 milliLiter(s) IV Push every 8 hours  tacrolimus 4 milliGRAM(s) Oral <User Schedule>  vancomycin    Solution 125 milliGRAM(s) Oral every 6 hours    Vitals:  T(C): 36.7 (20 @ 13:36), Max: 36.7 (20 @ 20:29)  HR: 112 (20 @ 18:23) (112 - 123)  BP: 108/70 (20 @ 18:23) (93/63 - 108/73)  BP(mean): --  RR: 16 (20 @ 13:36) (16 - 18)  SpO2: 95% (20 @ 13:36) (95% - 97%)    Daily     Daily Weight in k.5 (18 Sep 2020 08:07)        I&O's Summary    17 Sep 2020 07:01  -  18 Sep 2020 07:00  --------------------------------------------------------  IN: 1800 mL / OUT: 1400 mL / NET: 400 mL    18 Sep 2020 07:01  -  18 Sep 2020 18:27  --------------------------------------------------------  IN: 440 mL / OUT: 400 mL / NET: 40 mL        Physical Exam:  Appearance: No Acute Distress  HEENT: JVP non elevated  Cardiovascular: Tachycardic, regular. Normal S1 and S2. III/VI combined systolic and diastolic murmur heard across precordium.  Respiratory: Clear to auscultation bilaterally  Gastrointestinal: mildly distended, mildly tender to palpation   Skin: no skin lesions  Neurologic: Non-focal  Extremities: trace LE edema, 2+ RUE edema with PICC in place, warm and well perfused  Psychiatry: A & O x 3, Mood & affect appropriate    Labs:                        8.6    7.90  )-----------( 100      ( 18 Sep 2020 09:40 )             29.0     18    136  |  98  |  55<H>  ----------------------------<  99  4.2   |  27  |  1.29    Ca    8.2<L>      18 Sep 2020 09:50  Phos  3.3     17  Mg     2.4     18                  Lactate Dehydrogenase, Serum: 527 U/L ( @ 09:50)

## 2020-09-18 NOTE — PROGRESS NOTE ADULT - SUBJECTIVE AND OBJECTIVE BOX
[facility] NUTRITION SUPPORT / TPN -- FOLLOW UP NOTE  --------------------------------------------------------------------------------    24 hour events/subjective:    pt tolerating TF overnight       ate some breakfast, but was 'full'       pt picked at meals, even food from home/outside requests       calorie count noted  some abdominal discomfort making him afraid to eat  no bloating  No N/ V  (+) flatus/ (+)BM   no f/c/s  no dyspnea/ sob/ palp/ cp      Diet:  Diet, Consistent Carbohydrate/No Snacks:   Fiber/Residue Restricted (LOWFIBER)  Tube Feeding Modality: Nasogastric  Glucerna 1.5 Sohail (GLUCERNA1.5RTH)  Total Volume for 24 Hours (mL): 960  Continuous  Until Goal Tube Feed Rate (mL per Hour): 80  Tube Feed Duration (in Hours): 12  Tube Feed Start Time: 18:00  Supplement Feeding Modality:  Oral  Glucerna Shake Cans or Servings Per Day:  3       Frequency:  Daily (09-18-20 @ 09:22)      Appetite: [ x ]Poor [  ]Adequate [  ]Good  Caloric intake:  [ x  ]  Adequate   [   ] Inadequate    ROS: General/ GI see HPI  all other systems negative      ALLERGIES & MEDICATIONS  --------------------------------------------------------------------------------    No Known Allergies    STANDING INPATIENT MEDICATIONS    chlorhexidine 2% Cloths 1 Application(s) Topical <User Schedule>  dextrose 5%. 1000 milliLiter(s) IV Continuous <Continuous>  dextrose 50% Injectable 12.5 Gram(s) IV Push once  dextrose 50% Injectable 25 Gram(s) IV Push once  dextrose 50% Injectable 25 Gram(s) IV Push once  dronabinol 5 milliGRAM(s) Oral <User Schedule>  furosemide   Injectable 40 milliGRAM(s) IV Push every 12 hours  heparin   Injectable 5000 Unit(s) SubCutaneous every 8 hours  hydrALAZINE 25 milliGRAM(s) Oral every 8 hours  insulin lispro (HumaLOG) corrective regimen sliding scale   SubCutaneous every 6 hours  insulin lispro Injectable (HumaLOG) 3 Unit(s) SubCutaneous three times a day before meals  insulin NPH human recombinant 5 Unit(s) SubCutaneous every 12 hours  pantoprazole    Tablet 40 milliGRAM(s) Oral before breakfast  predniSONE   Tablet 10 milliGRAM(s) Oral <User Schedule>  sodium chloride 0.9% lock flush 3 milliLiter(s) IV Push every 8 hours  tacrolimus 4 milliGRAM(s) Oral <User Schedule>  vancomycin    Solution 125 milliGRAM(s) Oral every 6 hours      PRN INPATIENT MEDICATION  benzocaine 15 mG/menthol 3.6 mG (Sugar-Free) Lozenge 1 Lozenge Oral every 6 hours PRN  dextrose 40% Gel 15 Gram(s) Oral once PRN  glucagon  Injectable 1 milliGRAM(s) IntraMuscular once PRN  sodium chloride 0.9% lock flush 10 milliLiter(s) IV Push every 1 hour PRN        VITALS/PHYSICAL EXAM  --------------------------------------------------------------------------------  T(C): 36.7 (09-18-20 @ 13:36), Max: 36.7 (09-17-20 @ 20:29)  HR: 114 (09-18-20 @ 13:36) (114 - 123)  BP: 104/69 (09-18-20 @ 13:36) (93/63 - 108/73)  RR: 16 (09-18-20 @ 13:36) (16 - 18)  SpO2: 95% (09-18-20 @ 13:36) (95% - 97%)  Wt(kg): --        09-17-20 @ 07:01  -  09-18-20 @ 07:00  --------------------------------------------------------  IN: 1800 mL / OUT: 1400 mL / NET: 400 mL    09-18-20 @ 07:01  -  09-18-20 @ 13:57  --------------------------------------------------------  IN: 440 mL / OUT: 400 mL / NET: 40 mL      PHYSICAL EXAM:  GEN:  guarded but stable, WD/WN/WG, (+)KO TF  HEENT: NC/AT, PERRL, mucosa moist & throat clear, no trachea midline w/ neck supple  GI: (+) BS, softly distended, nontender   MSK:  FROM x4, no deformities  VASCULAR: Equally warm, no cyanosis, clubbing,          BLE mild edema equal,  R>LUE edema- soft nontender w/o cord or erythema  Neurology; no focal deficits, weakened strength & sensation grossly intact  Psych: normal affect  Skin: warm,  moist, & w/ good turgor      LABS/ CULTURES/ RADIOLOGY:              8.6    7.90  >-----------<  100      [09-18-20 @ 09:40]              29.0     136  |  98  |  55  ----------------------------<  99      [09-18-20 @ 09:50]  4.2   |  27  |  1.29        Ca     8.2     [09-18-20 @ 09:50]      Mg     2.4     [09-18-20 @ 09:50]      Phos  3.3     [09-17-20 @ 09:27]          [09-18-20 @ 09:50]    CAPILLARY BLOOD GLUCOSE  POCT Blood Glucose.: 138 mg/dL (18 Sep 2020 12:03)  POCT Blood Glucose.: 137 mg/dL (18 Sep 2020 06:05)  POCT Blood Glucose.: 106 mg/dL (17 Sep 2020 23:56)  POCT Blood Glucose.: 112 mg/dL (17 Sep 2020 17:01)    Prealbumin, Serum: 25 mg/dL (09-13-20 @ 05:27)  Prealbumin, Serum: 22 mg/dL (09-08-20 @ 16:22)  Prealbumin, Serum: 10 mg/dL (09-02-20 @ 08:12)  Prealbumin, Serum: 24 mg/dL (08-25-20 @ 15:10)      Triglycerides, Serum: 395 mg/dL (09-17-20 @ 09:27)  Triglycerides, Serum: 188 mg/dL (09-12-20 @ 00:39)  Triglycerides, Serum: 185 mg/dL (09-11-20 @ 00:31)  Triglycerides, Serum: 200 mg/dL (09-10-20 @ 00:41)  Triglycerides, Serum: 289 mg/dL (09-09-20 @ 00:29)

## 2020-09-19 DIAGNOSIS — R60.9 EDEMA, UNSPECIFIED: ICD-10-CM

## 2020-09-19 LAB
ANION GAP SERPL CALC-SCNC: 10 MMOL/L — SIGNIFICANT CHANGE UP (ref 5–17)
BUN SERPL-MCNC: 46 MG/DL — HIGH (ref 7–23)
CALCIUM SERPL-MCNC: 8.6 MG/DL — SIGNIFICANT CHANGE UP (ref 8.4–10.5)
CHLORIDE SERPL-SCNC: 100 MMOL/L — SIGNIFICANT CHANGE UP (ref 96–108)
CMV DNA CSF QL NAA+PROBE: SIGNIFICANT CHANGE UP
CO2 SERPL-SCNC: 30 MMOL/L — SIGNIFICANT CHANGE UP (ref 22–31)
CREAT SERPL-MCNC: 1.2 MG/DL — SIGNIFICANT CHANGE UP (ref 0.5–1.3)
GLUCOSE BLDC GLUCOMTR-MCNC: 105 MG/DL — HIGH (ref 70–99)
GLUCOSE BLDC GLUCOMTR-MCNC: 119 MG/DL — HIGH (ref 70–99)
GLUCOSE BLDC GLUCOMTR-MCNC: 127 MG/DL — HIGH (ref 70–99)
GLUCOSE BLDC GLUCOMTR-MCNC: 132 MG/DL — HIGH (ref 70–99)
GLUCOSE SERPL-MCNC: 128 MG/DL — HIGH (ref 70–99)
HCT VFR BLD CALC: 29.8 % — LOW (ref 39–50)
HGB BLD-MCNC: 8.7 G/DL — LOW (ref 13–17)
MCHC RBC-ENTMCNC: 29.2 GM/DL — LOW (ref 32–36)
MCHC RBC-ENTMCNC: 32 PG — SIGNIFICANT CHANGE UP (ref 27–34)
MCV RBC AUTO: 109.6 FL — HIGH (ref 80–100)
NRBC # BLD: 2 /100 WBCS — HIGH (ref 0–0)
PHOSPHATE SERPL-MCNC: 3.2 MG/DL — SIGNIFICANT CHANGE UP (ref 2.5–4.5)
PLATELET # BLD AUTO: 130 K/UL — LOW (ref 150–400)
POTASSIUM SERPL-MCNC: 3.8 MMOL/L — SIGNIFICANT CHANGE UP (ref 3.5–5.3)
POTASSIUM SERPL-SCNC: 3.8 MMOL/L — SIGNIFICANT CHANGE UP (ref 3.5–5.3)
RBC # BLD: 2.72 M/UL — LOW (ref 4.2–5.8)
RBC # FLD: 26.5 % — HIGH (ref 10.3–14.5)
SODIUM SERPL-SCNC: 140 MMOL/L — SIGNIFICANT CHANGE UP (ref 135–145)
TACROLIMUS SERPL-MCNC: 7.6 NG/ML — SIGNIFICANT CHANGE UP
WBC # BLD: 7.79 K/UL — SIGNIFICANT CHANGE UP (ref 3.8–10.5)
WBC # FLD AUTO: 7.79 K/UL — SIGNIFICANT CHANGE UP (ref 3.8–10.5)

## 2020-09-19 PROCEDURE — 71260 CT THORAX DX C+: CPT | Mod: 26

## 2020-09-19 PROCEDURE — 99233 SBSQ HOSP IP/OBS HIGH 50: CPT

## 2020-09-19 PROCEDURE — 99232 SBSQ HOSP IP/OBS MODERATE 35: CPT

## 2020-09-19 RX ORDER — TACROLIMUS 5 MG/1
4.5 CAPSULE ORAL
Refills: 0 | Status: DISCONTINUED | OUTPATIENT
Start: 2020-09-19 | End: 2020-09-23

## 2020-09-19 RX ADMIN — Medication 125 MILLIGRAM(S): at 11:58

## 2020-09-19 RX ADMIN — Medication 3 UNIT(S): at 07:53

## 2020-09-19 RX ADMIN — Medication 5 MILLIGRAM(S): at 05:41

## 2020-09-19 RX ADMIN — HEPARIN SODIUM 5000 UNIT(S): 5000 INJECTION INTRAVENOUS; SUBCUTANEOUS at 05:40

## 2020-09-19 RX ADMIN — SODIUM CHLORIDE 3 MILLILITER(S): 9 INJECTION INTRAMUSCULAR; INTRAVENOUS; SUBCUTANEOUS at 14:35

## 2020-09-19 RX ADMIN — SODIUM CHLORIDE 3 MILLILITER(S): 9 INJECTION INTRAMUSCULAR; INTRAVENOUS; SUBCUTANEOUS at 06:10

## 2020-09-19 RX ADMIN — Medication 5 MILLIGRAM(S): at 12:34

## 2020-09-19 RX ADMIN — SODIUM CHLORIDE 3 MILLILITER(S): 9 INJECTION INTRAMUSCULAR; INTRAVENOUS; SUBCUTANEOUS at 21:20

## 2020-09-19 RX ADMIN — Medication 10 MILLIGRAM(S): at 07:53

## 2020-09-19 RX ADMIN — HUMAN INSULIN 5 UNIT(S): 100 INJECTION, SUSPENSION SUBCUTANEOUS at 06:10

## 2020-09-19 RX ADMIN — PANTOPRAZOLE SODIUM 40 MILLIGRAM(S): 20 TABLET, DELAYED RELEASE ORAL at 05:52

## 2020-09-19 RX ADMIN — Medication 25 MILLIGRAM(S): at 05:52

## 2020-09-19 RX ADMIN — Medication 25 MILLIGRAM(S): at 14:38

## 2020-09-19 RX ADMIN — Medication 40 MILLIGRAM(S): at 05:40

## 2020-09-19 RX ADMIN — HEPARIN SODIUM 5000 UNIT(S): 5000 INJECTION INTRAVENOUS; SUBCUTANEOUS at 14:38

## 2020-09-19 RX ADMIN — Medication 25 MILLIGRAM(S): at 21:31

## 2020-09-19 RX ADMIN — Medication 40 MILLIGRAM(S): at 17:23

## 2020-09-19 RX ADMIN — CHLORHEXIDINE GLUCONATE 1 APPLICATION(S): 213 SOLUTION TOPICAL at 07:58

## 2020-09-19 RX ADMIN — TACROLIMUS 4 MILLIGRAM(S): 5 CAPSULE ORAL at 07:53

## 2020-09-19 RX ADMIN — Medication 125 MILLIGRAM(S): at 17:23

## 2020-09-19 RX ADMIN — TACROLIMUS 4.5 MILLIGRAM(S): 5 CAPSULE ORAL at 20:08

## 2020-09-19 RX ADMIN — Medication 125 MILLIGRAM(S): at 05:51

## 2020-09-19 RX ADMIN — Medication 125 MILLIGRAM(S): at 00:01

## 2020-09-19 RX ADMIN — HEPARIN SODIUM 5000 UNIT(S): 5000 INJECTION INTRAVENOUS; SUBCUTANEOUS at 21:31

## 2020-09-19 NOTE — PROGRESS NOTE ADULT - ASSESSMENT
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 Patient was treated w/ prednisone and Rituximab. Tacro was discontinued and patient was also transitioned to everolimus  Admitted  6/16-6/19  for hyperglycemia.  Reported to transplant team having one done black tarry stool-  instructed to  present to [facility] for hemolytic anemia. Patient has been following with Hematology outpatient.  Denies CP  N/V diarrhea SOB. (11 Aug 2020 20:08)  /11 Admitted to [facility] with symptomatic anemia  8/13 EGD for investigation of tarry stools  8/15 SB capsule: stomach & SB lesions, likely ulcers.  8/17 EGD/push enteroscopy w/ angioectasias/erosions in small bowel. Gastropathy and esophagitis. Ulcer seen on VCE most likely scope trauma.    8/18 VSS transferred back to floor.   8/20 VS 9.0/28.4 stable Gastric biopsy results LA Grade A esophagitis.  - Acute gastritis. Biopsies taken to r/o CMV, No ulceration seen despite finding on capsule endoscopy - likely 2/2 scope  trauma that has since resolved. Pathology pending.  8/21 VSS H/H  8.1/25.7  trending down will monitor prednisone taper 30 mg today. Procardia 120 initiated today RHC biopsy Monday.   8/22 VVS; Patient reports loose stools x 2-3 days with 1 episode today.  Stool sent for C-dif and GI PCR. Abdominal X-ray revealed: dilated loops of bowel. Abdomen distended.  Patient denies N/V. GI called to re-evaluate. Continue with current medication regimen.  Awaiting for upcoming cardiac biopsy on Monday 8/24.  8/23 VVS; creat up to 2.28, repeating CMP, TTE ordered Bladder scan   8/24 cardiac bx cancelled. elev creat  to 2.74   cardio renal cslt called, + CDIF  increase vanco to 500 q6 ID following  8/25 VSS: RHC today as per transplant team; transfuse 2 units PRBC today as per Dr. Viramontes; continue vanco for c-diff; transfer patient to CTU after cath today   8/26. Dieretic challenge with good response   8/27: Downgraded to the floor then upgraded to the CTU again for concerning of abd distention   8/28 CT ABD & Pelvis reveals pancolitis  8/30: repeat CT scan of abd, gen surgery called to re-evaluated pt.  8/31: RUE duplex negative for DVT  9/4 NGT clamped, minimal residual. NGT removed. started sips   9/6 tolerating clear liquid diet  9/8 Bronch  9/9 1 prbc   9/10 increased tube feeds to 20cc/hr and tpn decreased to 63cc/hr from 125cc/hr   9/12 TPN discontinued  9/14 Diet advanced, tube feeds at 75% goal, central line removed and midline inserted.  9/14 pt completed approp 75 % of meal.   pt ambulated to the door this afternoon  9/15 Pt tolerating PO diet with supplemental tube feeds. calorie count iniated  9/16 VSS OOB in chair no acute distress transferred to [facility] nocturnal tube feeds 80 cc/hrx12 - RUE + edema Tacro level 7.2  PT disposition  to rehab.  9/177 VSS flat affect dronabinol added appetite stimulant at goal with Glucerna nocturnal feeds.. Rosas removed this am  9/18 VSS Remains on Glucerna TF, failed calorie count, Glucerna increased to 3 daily. Loose BM Improving--continue on Vanco 125mg QID taper as per ID.   9/19 VVS; RUE edema --> Vascular surgery consult called per CHF for prior occlusion of right subclavian.  Right Midline D/C'd . No evidence of DVT. CT Venogram of RUE to evaluate patency of subclavian

## 2020-09-19 NOTE — PROGRESS NOTE ADULT - PROBLEM SELECTOR PLAN 5
Vascular surgery consult called per CHF for prior occlusion of right subclavian.  Right Midline D/C'd  No evidence of DVT on Venous duple study   CT Venogram of RUE to evaluate patency of subclavian

## 2020-09-19 NOTE — CONSULT NOTE ADULT - SUBJECTIVE AND OBJECTIVE BOX
HPI:   70 year old man s/p heart transplantation on 2/2018 with early post-operative treatment for possible antibody mediated rejection. 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 He was treated with Rituximab and high dose steroids. He is currently admitted with symptomatic anemia with Hgb initially of 6.6 requiring blood transfusions. Evaluation was not consistent with hemolysis. EGD/enteroscopy eventually revealed chronic gastritis and small bowel ectasias.  His course was complicated by development of severe leukopenia and acute respiratory distress and profound sinus tachycardia on 8/13 in setting of Klebsiella bacteremia of unclear origin (preceded EGD). A CT scan of the chest showed a possible infiltrate. While his bacteremia was treated, he subsequently developed C diff colitis with severe abdominal distention. He also developed worsening acute on chronic renal failure likely from volume overload which has resolved with diuretics. He has ongoing improvement in abdominal distention and leukocytosis. He was briefly on TPN but now tolerating enteral feeds though still requiring tube feeding.     Found to have edema on right extremity. Multiple duplex negative for DVT. Chart review brought to light a prior occlusion of R. subclavian vein. Concern for infiltration of TPN vs. now complete occlusion. Vascular surgery consulted for management.       PAST MEDICAL & SURGICAL HISTORY:  H/O hemolytic anemia    H/O autoimmune hemolytic anemia    Knee pain, right    HLD (hyperlipidemia)    Former smoker    DVT of upper extremity (deep vein thrombosis)    Hepatitis C virus    GIB (gastrointestinal bleeding)    Ventricular fibrillation  s/p AICD    PAF (paroxysmal atrial fibrillation)  on xarelto    Non-Ischemic Cardiomyopathy    SVT (Supraventricular Tachycardia)    HTN    CHF (Congestive Heart Failure)    S/P right heart catheterization  biopsy multiple    H/O heart transplant  2/2018    Status post left hip replacement    History of Prior Ablation Treatment  for afib    AICD (Automatic Cardioverter/Defibrillator) Present  [employer] with 1 [employer] lead4/1/09- explanted and replaced with [employer] 2 leads on 9/2/09        MEDICATIONS  (STANDING):  chlorhexidine 2% Cloths 1 Application(s) Topical <User Schedule>  dextrose 5%. 1000 milliLiter(s) (50 mL/Hr) IV Continuous <Continuous>  dextrose 50% Injectable 12.5 Gram(s) IV Push once  dextrose 50% Injectable 25 Gram(s) IV Push once  dextrose 50% Injectable 25 Gram(s) IV Push once  dronabinol 5 milliGRAM(s) Oral <User Schedule>  furosemide   Injectable 40 milliGRAM(s) IV Push every 12 hours  heparin   Injectable 5000 Unit(s) SubCutaneous every 8 hours  hydrALAZINE 25 milliGRAM(s) Oral every 8 hours  insulin lispro (HumaLOG) corrective regimen sliding scale   SubCutaneous every 6 hours  insulin lispro Injectable (HumaLOG) 3 Unit(s) SubCutaneous three times a day before meals  insulin NPH human recombinant 5 Unit(s) SubCutaneous every 12 hours  pantoprazole    Tablet 40 milliGRAM(s) Oral before breakfast  predniSONE   Tablet 10 milliGRAM(s) Oral <User Schedule>  sodium chloride 0.9% lock flush 3 milliLiter(s) IV Push every 8 hours  tacrolimus 4 milliGRAM(s) Oral <User Schedule>  vancomycin    Solution 125 milliGRAM(s) Oral every 6 hours    MEDICATIONS  (PRN):  benzocaine 15 mG/menthol 3.6 mG (Sugar-Free) Lozenge 1 Lozenge Oral every 6 hours PRN Sore Throat  dextrose 40% Gel 15 Gram(s) Oral once PRN Blood Glucose LESS THAN 70 milliGRAM(s)/deciliter  glucagon  Injectable 1 milliGRAM(s) IntraMuscular once PRN Glucose LESS THAN 70 milligrams/deciliter  sodium chloride 0.9% lock flush 10 milliLiter(s) IV Push every 1 hour PRN Pre/post blood products, medications, blood draw, and to maintain line patency      Allergies  No Known Allergies        SOCIAL HISTORY:  Denies smoking.  Denies drinking    FAMILY HISTORY:  No pertinent family history in first degree relatives        Physical Exam:  General: NAD, resting comfortably, A&Ox  HEENT: NC/AT, EOMI, normal hearing,  neck supple  Pulmonary: No additional work of breathing  Cardiovascular: NSR, no evidence of cyanosis/edema  Abdominal: Soft, ND, NT. No organomegaly.   Extremities: WWP. Normal Strength. Full ROM.     Vital Signs Last 24 Hrs  T(C): 36.6 (19 Sep 2020 05:27), Max: 36.7 (18 Sep 2020 13:36)  T(F): 97.9 (19 Sep 2020 05:27), Max: 98.1 (18 Sep 2020 13:36)  HR: 118 (19 Sep 2020 05:27) (112 - 118)  BP: 106/73 (19 Sep 2020 05:27) (104/69 - 108/70)  BP(mean): --  RR: 18 (19 Sep 2020 05:27) (16 - 18)  SpO2: 95% (19 Sep 2020 05:27) (95% - 96%)    I&O's Summary    18 Sep 2020 07:01  -  19 Sep 2020 07:00  --------------------------------------------------------  IN: 2300 mL / OUT: 650 mL / NET: 1650 mL    19 Sep 2020 07:01  -  19 Sep 2020 12:27  --------------------------------------------------------  IN: 240 mL / OUT: 500 mL / NET: -260 mL        LABS:                        8.7    7.79  )-----------( 130      ( 19 Sep 2020 07:48 )             29.8     09-19    140  |  100  |  46<H>  ----------------------------<  128<H>  3.8   |  30  |  1.20    Ca    8.6      19 Sep 2020 07:48  Phos  3.2     09-19  Mg     2.4     09-18          CAPILLARY BLOOD GLUCOSE  POCT Blood Glucose.: 119 mg/dL (19 Sep 2020 11:44)  POCT Blood Glucose.: 127 mg/dL (19 Sep 2020 07:30)  POCT Blood Glucose.: 132 mg/dL (19 Sep 2020 05:59)  POCT Blood Glucose.: 109 mg/dL (18 Sep 2020 23:49)  POCT Blood Glucose.: 110 mg/dL (18 Sep 2020 22:05)  POCT Blood Glucose.: 125 mg/dL (18 Sep 2020 18:21)        RADIOLOGY & ADDITIONAL STUDIES:

## 2020-09-19 NOTE — PROGRESS NOTE ADULT - SUBJECTIVE AND OBJECTIVE BOX
VITAL SIGNS    Subjective: "I'm feeling ok." Denies CP, palpitation, SOB, LOBATO, HA, dizziness, N/V/D, fever or chills.  No acute event noted overnight.     Telemetry:  -115    Vital Signs Last 24 Hrs  T(C): 36.7 (20 @ 14:19), Max: 36.7 (20 @ 20:07)  T(F): 98.1 (20 @ 14:19), Max: 98.1 (20 @ 20:07)  HR: 106 (20 @ 14:19) (106 - 118)  BP: 111/79 (20 @ 14:19) (106/72 - 111/79)  RR: 18 (20 @ 14:19) (18 - 18)  SpO2: 96% (20 @ 14:19) (95% - 96%)              07:01  -   @ 07:00  --------------------------------------------------------  IN: 2300 mL / OUT: 650 mL / NET: 1650 mL     @ 07:01  -   @ 15:30  --------------------------------------------------------  IN: 240 mL / OUT: 600 mL / NET: -360 mL    Daily     Daily Weight in k.8 (19 Sep 2020 08:21)    CAPILLARY BLOOD GLUCOSE    POCT Blood Glucose.: 119 mg/dL (19 Sep 2020 11:44)  POCT Blood Glucose.: 127 mg/dL (19 Sep 2020 07:30)  POCT Blood Glucose.: 132 mg/dL (19 Sep 2020 05:59)  POCT Blood Glucose.: 109 mg/dL (18 Sep 2020 23:49)  POCT Blood Glucose.: 110 mg/dL (18 Sep 2020 22:05)  POCT Blood Glucose.: 125 mg/dL (18 Sep 2020 18:21)        PHYSICAL EXAM    Neurology: alert and oriented x 3, nonfocal, no gross deficits    CV: (+) S1 and S2, No murmurs, rubs, gallops or clicks     Lungs: CTA B/L     Abdomen: soft, nontender, nondistended, positive bowel sounds, (+) Flatus; (+) BM     :  Voiding               Extremities:  B/L LE (+) edema; negative calf tenderness; (+) 2 DP palpable; RUE edema.         benzocaine 15 mG/menthol 3.6 mG (Sugar-Free) Lozenge 1 Lozenge Oral every 6 hours PRN  chlorhexidine 2% Cloths 1 Application(s) Topical <User Schedule>  dextrose 40% Gel 15 Gram(s) Oral once PRN  dextrose 5%. 1000 milliLiter(s) IV Continuous <Continuous>  dextrose 50% Injectable 12.5 Gram(s) IV Push once  dextrose 50% Injectable 25 Gram(s) IV Push once  dextrose 50% Injectable 25 Gram(s) IV Push once  dronabinol 5 milliGRAM(s) Oral <User Schedule>  furosemide   Injectable 40 milliGRAM(s) IV Push every 12 hours  glucagon  Injectable 1 milliGRAM(s) IntraMuscular once PRN  heparin   Injectable 5000 Unit(s) SubCutaneous every 8 hours  hydrALAZINE 25 milliGRAM(s) Oral every 8 hours  insulin lispro (HumaLOG) corrective regimen sliding scale   SubCutaneous every 6 hours  insulin lispro Injectable (HumaLOG) 3 Unit(s) SubCutaneous three times a day before meals  insulin NPH human recombinant 5 Unit(s) SubCutaneous every 12 hours  pantoprazole    Tablet 40 milliGRAM(s) Oral before breakfast  predniSONE   Tablet 10 milliGRAM(s) Oral <User Schedule>  sodium chloride 0.9% lock flush 10 milliLiter(s) IV Push every 1 hour PRN  sodium chloride 0.9% lock flush 3 milliLiter(s) IV Push every 8 hours  tacrolimus 4 milliGRAM(s) Oral <User Schedule>  vancomycin    Solution 125 milliGRAM(s) Oral every 6 hours    Physical Therapy Rec:   Home  [  ]   Home w/ PT  [ X ]  Rehab  [  ]    Discussed with Cardiothoracic Team at AM rounds.

## 2020-09-19 NOTE — PROGRESS NOTE ADULT - PROBLEM SELECTOR PLAN 7
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  - consult vascular for further recommendations  - keep R arm elevated

## 2020-09-19 NOTE — PROGRESS NOTE ADULT - SUBJECTIVE AND OBJECTIVE BOX
Subjective:  No overnight events. Continues to note pain/swelling at RUE    Medications:  chlorhexidine 0.12% Liquid 15 milliLiter(s) Oral Mucosa every 12 hours  dextrose 50% Injectable 50 milliLiter(s) IV Push every 15 minutes  DOBUTamine Infusion 5 MICROgram(s)/kG/Min IV Continuous <Continuous>  EPINEPHrine    Infusion 0.071 MICROgram(s)/kG/Min IV Continuous <Continuous>  insulin regular Infusion 6 Unit(s)/Hr IV Continuous <Continuous>  leucovorin 5 milliGRAM(s) Oral <User Schedule>  meropenem  IVPB 1000 milliGRAM(s) IV Intermittent every 8 hours  methylPREDNISolone sodium succinate Injectable 125 milliGRAM(s) IV Push every 8 hours  milrinone Infusion 0.3 MICROgram(s)/kG/Min IV Continuous <Continuous>  mycophenolate mofetil IVPB 1000 milliGRAM(s) IV Intermittent <User Schedule>  norepinephrine Infusion 0.02 MICROgram(s)/kG/Min IV Continuous <Continuous>  pantoprazole  Injectable 40 milliGRAM(s) IV Push daily  potassium chloride  10 mEq/50 mL IVPB 10 milliEquivalent(s) IV Intermittent once  potassium chloride  10 mEq/50 mL IVPB 10 milliEquivalent(s) IV Intermittent once  potassium chloride  10 mEq/50 mL IVPB 10 milliEquivalent(s) IV Intermittent once  propofol Infusion 10 MICROgram(s)/kG/Min IV Continuous <Continuous>  pyrimethamine 25 milliGRAM(s) Oral <User Schedule>  sodium chloride 0.9%. 1000 milliLiter(s) IV Continuous <Continuous>  vancomycin  IVPB 1000 milliGRAM(s) IV Intermittent every 12 hours  vasopressin Infusion 0.067 Unit(s)/Min IV Continuous <Continuous>    Vitals:  T(C): 37.5 (20 @ 12:00), Max: 38 (20 @ 08:00)  HR: 108 (20 @ 14:30) (108 - 124)  BP: --  BP(mean): --  RR: 12 (20 @ 14:30) (11 - 24)  SpO2: 100% (20 @ 14:30) (96% - 100%)    Daily Height in cm: 172.72 (18 Sep 2020 23:21)    Daily Weight in k.9 (19 Sep 2020 00:00)    Weight (kg): 75.1 ( @ 23:21)    I&O's Summary    18 Sep 2020 07:01  -  19 Sep 2020 07:00  --------------------------------------------------------  IN: 3440.9 mL / OUT: 2290 mL / NET: 1150.9 mL    19 Sep 2020 07:01  -  19 Sep 2020 16:07  --------------------------------------------------------  IN: 989.7 mL / OUT: 470 mL / NET: 519.7 mL        Physical Exam:  Appearance: No Acute Distress  HEENT: JVP non elevated  Cardiovascular: Tachycardic, regular. Normal S1 and S2. III/VI combined systolic and diastolic murmur heard across precordium.  Respiratory: Clear to auscultation bilaterally  Gastrointestinal: mildly distended, mildly tender to palpation   Skin: no skin lesions  Neurologic: Non-focal  Extremities: trace LE edema, 2+ RUE edema with midlinein place, warm and well perfused  Psychiatry: A & O x 3, Mood & affect appropriate        Labs:                        9.7    16.03 )-----------( 192      ( 19 Sep 2020 03:24 )             28.2         136  |  99  |  18  ----------------------------<  236<H>  4.2   |  23  |  0.57    Ca    8.6      19 Sep 2020 03:24  Phos  3.0       Mg     2.9         TPro  5.4<L>  /  Alb  3.5  /  TBili  1.4<H>  /  DBili  x   /  AST  156<H>  /  ALT  62<H>  /  AlkPhos  48  -19      PT/INR - ( 19 Sep 2020 03:24 )   PT: 14.5 sec;   INR: 1.23 ratio         PTT - ( 19 Sep 2020 03:24 )  PTT:27.3 sec      Serum Pro-Brain Natriuretic Peptide: 1342 pg/mL (09-15 @ 10:42)    Oxygen Saturation, Mixed: 60 % ( @ 15:08)  Oxygen Saturation, Mixed: 62 % ( @ 12:00)  Oxygen Saturation, Mixed: 68 % ( @ 08:42)  Oxygen Saturation, Mixed: 66 % ( @ 06:02)  Oxygen Saturation, Mixed: 68 % ( @ 04:56)  Oxygen Saturation, Mixed: 73 % ( @ 03:55)  Oxygen Saturation, Mixed: 61 % ( @ 02:08)  Oxygen Saturation, Mixed: 64 % ( @ 00:31)  Oxygen Saturation, Mixed: 45 % ( @ 00:01)  Oxygen Saturation, Mixed: 49 % ( @ 22:37)    Lactate Dehydrogenase, Serum: 457 U/L ( @ 00:43)  Lactate Dehydrogenase, Serum: 521 U/L ( @ 00:12)

## 2020-09-19 NOTE — PROGRESS NOTE ADULT - PROBLEM SELECTOR PLAN 2
- Continue tacrolimus, goal will be ~10 as monotherapy (previously on everolimus).   - Continue prednisone 10 mg daily, will not wean until confirming absence of hemolysis on tacrolimus   - Will need to resume statin and aspirin when stable.

## 2020-09-19 NOTE — PROGRESS NOTE ADULT - ASSESSMENT
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 He was treated with Rituximab and high dose steroids. He is currently admitted with symptomatic anemia with Hgb initially of 6.6 requiring blood transfusions. Evaluation was not consistent with hemolysis. EGD/enteroscopy eventually revealed chronic gastritis and small bowel ectasias.  His course was complicated by development of severe leukopenia and acute respiratory distress and profound sinus tachycardia on 8/13 in setting of Klebsiella bacteremia of unclear origin (preceded EGD). A CT scan of the chest showed a possible infiltrate. While his bacteremia was treated, he subsequently developed C diff colitis with severe abdominal distention. He also developed worsening acute on chronic renal failure likely from volume overload which has resolved with diuretics. He has ongoing improvement in abdominal distention and leukocytosis. He was briefly on TPN but now tolerating enteral feeds though still requiring tube feeding to meet caloric needs. He is making slow progress but remains deconditioned and dependent on tube feeds. He also has RUE pain/swelling likely related to known R subclavian occlusion with TPN administered through R midline which has since been discontinued.

## 2020-09-19 NOTE — CONSULT NOTE ADULT - ASSESSMENT
70M s/p heart transplantation on 2/2018 with early post-operative treatment for possible antibody mediated rejection and hemolytic anemia, admitted for symptomatic anemia; course complicated by C.Diff requiring TPN w/ right sided PICC line placement. Found to have 3+ edema of RUE. Prior occlusion of R. subclavian. Vascular surgery consulted for management.     Plan:   - D/c R. Midline  - No longer on TPN, continue to hold as patient is tolerating diet  - No evidence of DVT  - Venogram of RUE to evaluate patency of subclavian  - Will follow    Discussed with Dr. Fang, Vascular Surgery Fellow    Angie Leigh, PGY2  Vascular Surgery  [phone] 70M s/p heart transplantation on 2/2018 with early post-operative treatment for possible antibody mediated rejection and hemolytic anemia, admitted for symptomatic anemia; course complicated by C.Diff requiring TPN w/ right sided PICC line placement. Found to have 3+ edema of RUE. Prior occlusion of R. subclavian. Vascular surgery consulted for management.     Plan:   - D/c R. Midline  - No longer on TPN, continue to hold as patient is tolerating diet  - No evidence of DVT  - CT Venogram of RUE to evaluate patency of subclavian  - Will follow    Discussed with Dr. Fang, Vascular Surgery Fellow    Angie Leigh, PGY2  Vascular Surgery  [phone]

## 2020-09-20 LAB
ANION GAP SERPL CALC-SCNC: 10 MMOL/L — SIGNIFICANT CHANGE UP (ref 5–17)
BUN SERPL-MCNC: 40 MG/DL — HIGH (ref 7–23)
CALCIUM SERPL-MCNC: 8.6 MG/DL — SIGNIFICANT CHANGE UP (ref 8.4–10.5)
CHLORIDE SERPL-SCNC: 98 MMOL/L — SIGNIFICANT CHANGE UP (ref 96–108)
CO2 SERPL-SCNC: 30 MMOL/L — SIGNIFICANT CHANGE UP (ref 22–31)
CREAT SERPL-MCNC: 1.13 MG/DL — SIGNIFICANT CHANGE UP (ref 0.5–1.3)
GLUCOSE BLDC GLUCOMTR-MCNC: 106 MG/DL — HIGH (ref 70–99)
GLUCOSE BLDC GLUCOMTR-MCNC: 117 MG/DL — HIGH (ref 70–99)
GLUCOSE BLDC GLUCOMTR-MCNC: 119 MG/DL — HIGH (ref 70–99)
GLUCOSE BLDC GLUCOMTR-MCNC: 127 MG/DL — HIGH (ref 70–99)
GLUCOSE BLDC GLUCOMTR-MCNC: 141 MG/DL — HIGH (ref 70–99)
GLUCOSE BLDC GLUCOMTR-MCNC: 52 MG/DL — LOW (ref 70–99)
GLUCOSE SERPL-MCNC: 108 MG/DL — HIGH (ref 70–99)
HCT VFR BLD CALC: 30 % — LOW (ref 39–50)
HGB BLD-MCNC: 8.7 G/DL — LOW (ref 13–17)
MCHC RBC-ENTMCNC: 29 GM/DL — LOW (ref 32–36)
MCHC RBC-ENTMCNC: 32 PG — SIGNIFICANT CHANGE UP (ref 27–34)
MCV RBC AUTO: 110.3 FL — HIGH (ref 80–100)
NRBC # BLD: 2 /100 WBCS — HIGH (ref 0–0)
PLATELET # BLD AUTO: 157 K/UL — SIGNIFICANT CHANGE UP (ref 150–400)
POTASSIUM SERPL-MCNC: 3.6 MMOL/L — SIGNIFICANT CHANGE UP (ref 3.5–5.3)
POTASSIUM SERPL-SCNC: 3.6 MMOL/L — SIGNIFICANT CHANGE UP (ref 3.5–5.3)
RBC # BLD: 2.72 M/UL — LOW (ref 4.2–5.8)
RBC # FLD: 26.6 % — HIGH (ref 10.3–14.5)
SODIUM SERPL-SCNC: 138 MMOL/L — SIGNIFICANT CHANGE UP (ref 135–145)
TACROLIMUS SERPL-MCNC: 9.4 NG/ML — SIGNIFICANT CHANGE UP
WBC # BLD: 7.14 K/UL — SIGNIFICANT CHANGE UP (ref 3.8–10.5)
WBC # FLD AUTO: 7.14 K/UL — SIGNIFICANT CHANGE UP (ref 3.8–10.5)

## 2020-09-20 PROCEDURE — 99233 SBSQ HOSP IP/OBS HIGH 50: CPT

## 2020-09-20 RX ORDER — MAGNESIUM SULFATE 500 MG/ML
2 VIAL (ML) INJECTION ONCE
Refills: 0 | Status: COMPLETED | OUTPATIENT
Start: 2020-09-20 | End: 2020-09-20

## 2020-09-20 RX ORDER — POTASSIUM BICARBONATE 978 MG/1
25 TABLET, EFFERVESCENT ORAL
Refills: 0 | Status: COMPLETED | OUTPATIENT
Start: 2020-09-20 | End: 2020-09-20

## 2020-09-20 RX ADMIN — HEPARIN SODIUM 5000 UNIT(S): 5000 INJECTION INTRAVENOUS; SUBCUTANEOUS at 21:25

## 2020-09-20 RX ADMIN — SODIUM CHLORIDE 3 MILLILITER(S): 9 INJECTION INTRAMUSCULAR; INTRAVENOUS; SUBCUTANEOUS at 05:45

## 2020-09-20 RX ADMIN — PANTOPRAZOLE SODIUM 40 MILLIGRAM(S): 20 TABLET, DELAYED RELEASE ORAL at 06:11

## 2020-09-20 RX ADMIN — HEPARIN SODIUM 5000 UNIT(S): 5000 INJECTION INTRAVENOUS; SUBCUTANEOUS at 06:10

## 2020-09-20 RX ADMIN — Medication 125 MILLIGRAM(S): at 00:13

## 2020-09-20 RX ADMIN — HUMAN INSULIN 5 UNIT(S): 100 INJECTION, SUSPENSION SUBCUTANEOUS at 18:26

## 2020-09-20 RX ADMIN — Medication 125 MILLIGRAM(S): at 23:04

## 2020-09-20 RX ADMIN — Medication 25 MILLIGRAM(S): at 21:25

## 2020-09-20 RX ADMIN — Medication 3 UNIT(S): at 08:05

## 2020-09-20 RX ADMIN — HUMAN INSULIN 5 UNIT(S): 100 INJECTION, SUSPENSION SUBCUTANEOUS at 06:12

## 2020-09-20 RX ADMIN — Medication 25 MILLIGRAM(S): at 06:10

## 2020-09-20 RX ADMIN — TACROLIMUS 4.5 MILLIGRAM(S): 5 CAPSULE ORAL at 08:04

## 2020-09-20 RX ADMIN — POTASSIUM BICARBONATE 25 MILLIEQUIVALENT(S): 978 TABLET, EFFERVESCENT ORAL at 12:13

## 2020-09-20 RX ADMIN — Medication 25 MILLIGRAM(S): at 14:43

## 2020-09-20 RX ADMIN — Medication 40 MILLIGRAM(S): at 06:10

## 2020-09-20 RX ADMIN — Medication 125 MILLIGRAM(S): at 18:27

## 2020-09-20 RX ADMIN — CHLORHEXIDINE GLUCONATE 1 APPLICATION(S): 213 SOLUTION TOPICAL at 11:14

## 2020-09-20 RX ADMIN — Medication 125 MILLIGRAM(S): at 12:13

## 2020-09-20 RX ADMIN — Medication 10 MILLIGRAM(S): at 08:04

## 2020-09-20 RX ADMIN — SODIUM CHLORIDE 3 MILLILITER(S): 9 INJECTION INTRAMUSCULAR; INTRAVENOUS; SUBCUTANEOUS at 14:01

## 2020-09-20 RX ADMIN — POTASSIUM BICARBONATE 25 MILLIEQUIVALENT(S): 978 TABLET, EFFERVESCENT ORAL at 10:25

## 2020-09-20 RX ADMIN — TACROLIMUS 4.5 MILLIGRAM(S): 5 CAPSULE ORAL at 19:55

## 2020-09-20 RX ADMIN — Medication 125 MILLIGRAM(S): at 06:11

## 2020-09-20 RX ADMIN — SODIUM CHLORIDE 3 MILLILITER(S): 9 INJECTION INTRAMUSCULAR; INTRAVENOUS; SUBCUTANEOUS at 20:52

## 2020-09-20 RX ADMIN — Medication 50 GRAM(S): at 10:25

## 2020-09-20 RX ADMIN — HEPARIN SODIUM 5000 UNIT(S): 5000 INJECTION INTRAVENOUS; SUBCUTANEOUS at 14:43

## 2020-09-20 RX ADMIN — Medication 5 MILLIGRAM(S): at 07:22

## 2020-09-20 RX ADMIN — Medication 40 MILLIGRAM(S): at 18:26

## 2020-09-20 RX ADMIN — Medication 5 MILLIGRAM(S): at 13:04

## 2020-09-20 NOTE — CHART NOTE - NSCHARTNOTEFT_GEN_A_CORE
Vascular Surgery is following this patient for RUE swelling. On RUE venous duplex and CTV chest, there is no evidence of RUE DVT; there is, however, evidence of diminutive R innominate vein at its confluence with the L innominate vein. Given the lack of thrombus and in light of the patient's cardiac surgical history, there is no acute vascular surgical intervention indicated at this time. We recommend RUE elevation and compression dressing with ace wrap to reduce symptoms.     Vascular Surgery [phone]

## 2020-09-20 NOTE — PROGRESS NOTE ADULT - ASSESSMENT
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 Patient was treated w/ prednisone and Rituximab. Tacro was discontinued and patient was also transitioned to everolimus  Admitted  6/16-6/19  for hyperglycemia.  Reported to transplant team having one done black tarry stool-  instructed to  present to [facility] for hemolytic anemia. Patient has been following with Hematology outpatient.  Denies CP  N/V diarrhea SOB. (11 Aug 2020 20:08)  /11 Admitted to [facility] with symptomatic anemia  8/13 EGD for investigation of tarry stools  8/15 SB capsule: stomach & SB lesions, likely ulcers.  8/17 EGD/push enteroscopy w/ angioectasias/erosions in small bowel. Gastropathy and esophagitis. Ulcer seen on VCE most likely scope trauma.    8/18 VSS transferred back to floor.   8/20 VS 9.0/28.4 stable Gastric biopsy results LA Grade A esophagitis.  - Acute gastritis. Biopsies taken to r/o CMV, No ulceration seen despite finding on capsule endoscopy - likely 2/2 scope  trauma that has since resolved. Pathology pending.  8/21 VSS H/H  8.1/25.7  trending down will monitor prednisone taper 30 mg today. Procardia 120 initiated today RHC biopsy Monday.   8/22 VVS; Patient reports loose stools x 2-3 days with 1 episode today.  Stool sent for C-dif and GI PCR. Abdominal X-ray revealed: dilated loops of bowel. Abdomen distended.  Patient denies N/V. GI called to re-evaluate. Continue with current medication regimen.  Awaiting for upcoming cardiac biopsy on Monday 8/24.  8/23 VVS; creat up to 2.28, repeating CMP, TTE ordered Bladder scan   8/24 cardiac bx cancelled. elev creat  to 2.74   cardio renal cslt called, + CDIF  increase vanco to 500 q6 ID following  8/25 VSS: RHC today as per transplant team; transfuse 2 units PRBC today as per Dr. Viramontes; continue vanco for c-diff; transfer patient to CTU after cath today   8/26. Dieretic challenge with good response   8/27: Downgraded to the floor then upgraded to the CTU again for concerning of abd distention   8/28 CT ABD & Pelvis reveals pancolitis  8/30: repeat CT scan of abd, gen surgery called to re-evaluated pt.  8/31: RUE duplex negative for DVT  9/4 NGT clamped, minimal residual. NGT removed. started sips   9/6 tolerating clear liquid diet  9/8 Bronch  9/9 1 prbc   9/10 increased tube feeds to 20cc/hr and tpn decreased to 63cc/hr from 125cc/hr   9/12 TPN discontinued  9/14 Diet advanced, tube feeds at 75% goal, central line removed and midline inserted.  9/14 pt completed approp 75 % of meal.   pt ambulated to the door this afternoon  9/15 Pt tolerating PO diet with supplemental tube feeds. calorie count iniated  9/16 VSS OOB in chair no acute distress transferred to 2 [facility] nocturnal tube feeds 80 cc/hrx12 - RUE + edema Tacro level 7.2  PT disposition  to rehab.  9/177 VSS flat affect dronabinol added appetite stimulant at goal with Glucerna nocturnal feeds.. Rosas removed this am  9/18 VSS Remains on Glucerna TF, failed calorie count, Glucerna increased to 3 daily. Loose BM Improving--continue on Vanco 125mg QID taper as per ID.   9/19 VVS; RUE edema --> Vascular surgery consult called per CHF for prior occlusion of right subclavian.  Right Midline D/C'd . No evidence of DVT. CT Venogram of RUE to evaluate patency of subclavian  9/20 HD stable, cont PO vanco. Tacro level 9.4.

## 2020-09-20 NOTE — PROGRESS NOTE ADULT - SUBJECTIVE AND OBJECTIVE BOX
Interval Hx; Events Overnight:  SUBJECTIVE: "I feel ok "    LABS:                8.7                  138  | 30   | 40           7.14  >-----------< 157     ------------------------< 108                   30.0                 3.6  | 98   | 1.13                                         Ca 8.6   Mg x     Ph x              VITAL SIGNS    Telemetry: ST 120s     Daily     Daily       Vital Signs Last 24 Hrs  T(C): 36.7 (09-20-20 @ 06:05), Max: 36.7 (09-19-20 @ 14:19)  T(F): 98 (09-20-20 @ 06:05), Max: 98.1 (09-19-20 @ 14:19)  HR: 110 (09-20-20 @ 06:05) (106 - 110)  BP: 122/82 (09-20-20 @ 06:05) (105/72 - 122/82)  RR: 18 (09-20-20 @ 06:05) (18 - 18)  SpO2: 96% (09-20-20 @ 06:05) (95% - 96%)             I&O's Detail    19 Sep 2020 07:01  -  20 Sep 2020 07:00  --------------------------------------------------------  IN:    Glucerna 1.5: 1040 mL    Oral Fluid: 240 mL  Total IN: 1280 mL    OUT:    Voided (mL): 1300 mL  Total OUT: 1300 mL    Total NET: -20 mL      20 Sep 2020 07:01  -  20 Sep 2020 12:18  --------------------------------------------------------  IN:    Oral Fluid: 450 mL  Total IN: 450 mL    OUT:    Voided (mL): 550 mL  Total OUT: 550 mL    Total NET: -100 mL                    GLUCOSE  CAPILLARY BLOOD GLUCOSE      POCT Blood Glucose.: 141 mg/dL (20 Sep 2020 11:55)  POCT Blood Glucose.: 119 mg/dL (20 Sep 2020 07:39)  POCT Blood Glucose.: 52 mg/dL (20 Sep 2020 07:38)  POCT Blood Glucose.: 127 mg/dL (20 Sep 2020 05:54)  POCT Blood Glucose.: 106 mg/dL (20 Sep 2020 00:08)  POCT Blood Glucose.: 105 mg/dL (19 Sep 2020 19:26)        PHYSICAL EXAM      General: NAD, well appearing, in no distress  Neurology: A&O x3, non focal, no neuro deficits. Moves all extremities to command.  CV : s1 s2 RRR, no murmurs, gallops, clicks.   Lungs: clear to auscultation  Abdomen: soft, nontender, nondistended, positive bowel sounds, +BM  :    voiding       Extremities:    no  edema. + pedal pulses    RUE edema +2  Skin: intact, no lesions        MEDICATIONS  benzocaine 15 mG/menthol 3.6 mG (Sugar-Free) Lozenge 1 Lozenge Oral every 6 hours PRN  chlorhexidine 2% Cloths 1 Application(s) Topical <User Schedule>  dextrose 40% Gel 15 Gram(s) Oral once PRN  dextrose 5%. 1000 milliLiter(s) IV Continuous <Continuous>  dextrose 50% Injectable 12.5 Gram(s) IV Push once  dextrose 50% Injectable 25 Gram(s) IV Push once  dextrose 50% Injectable 25 Gram(s) IV Push once  dronabinol 5 milliGRAM(s) Oral <User Schedule>  furosemide   Injectable 40 milliGRAM(s) IV Push every 12 hours  glucagon  Injectable 1 milliGRAM(s) IntraMuscular once PRN  heparin   Injectable 5000 Unit(s) SubCutaneous every 8 hours  hydrALAZINE 25 milliGRAM(s) Oral every 8 hours  insulin lispro (HumaLOG) corrective regimen sliding scale   SubCutaneous every 6 hours  insulin lispro Injectable (HumaLOG) 3 Unit(s) SubCutaneous three times a day before meals  insulin NPH human recombinant 5 Unit(s) SubCutaneous every 12 hours  pantoprazole    Tablet 40 milliGRAM(s) Oral before breakfast  predniSONE   Tablet 10 milliGRAM(s) Oral <User Schedule>  sodium chloride 0.9% lock flush 10 milliLiter(s) IV Push every 1 hour PRN  sodium chloride 0.9% lock flush 3 milliLiter(s) IV Push every 8 hours  tacrolimus 4.5 milliGRAM(s) Oral <User Schedule>  vancomycin    Solution 125 milliGRAM(s) Oral every 6 hours

## 2020-09-20 NOTE — CHART NOTE - NSCHARTNOTEFT_GEN_A_CORE
As outlined in the study interpretation below, there is no evidence of RUE DVT. We recommend arm elevation and compression dressing to reduce swelling.    EXAM:  DUPLEX EXT VEINS UPPER RT                        PROCEDURE DATE:  09/17/2020    INTERPRETATION:  CLINICAL INFORMATION: Status post heart transplant. Right upper extremity swelling.  COMPARISON: Bilateral upper extremity venous duplex study dated 8/31/2020.  TECHNIQUE: Duplex sonography of the RIGHT UPPER extremity veins with color and spectral Doppler, with and without compression.  FINDINGS:  The right brachiocephalic, internal jugular, subclavian, axillary and brachial veins are patent and compressible where applicable.  The cephalic vein is patent and compressible in the upper arm. The basilic vein was not visualized.  Doppler examination shows normal spontaneous and phasic flow.  The left subclavian vein is patent.    IMPRESSION:  No evidence of right upper extremity deep venous thrombosis.

## 2020-09-21 LAB
ANION GAP SERPL CALC-SCNC: 9 MMOL/L — SIGNIFICANT CHANGE UP (ref 5–17)
BUN SERPL-MCNC: 37 MG/DL — HIGH (ref 7–23)
CALCIUM SERPL-MCNC: 8.8 MG/DL — SIGNIFICANT CHANGE UP (ref 8.4–10.5)
CHLORIDE SERPL-SCNC: 98 MMOL/L — SIGNIFICANT CHANGE UP (ref 96–108)
CO2 SERPL-SCNC: 30 MMOL/L — SIGNIFICANT CHANGE UP (ref 22–31)
CREAT SERPL-MCNC: 1.24 MG/DL — SIGNIFICANT CHANGE UP (ref 0.5–1.3)
GLUCOSE BLDC GLUCOMTR-MCNC: 101 MG/DL — HIGH (ref 70–99)
GLUCOSE BLDC GLUCOMTR-MCNC: 102 MG/DL — HIGH (ref 70–99)
GLUCOSE BLDC GLUCOMTR-MCNC: 106 MG/DL — HIGH (ref 70–99)
GLUCOSE BLDC GLUCOMTR-MCNC: 117 MG/DL — HIGH (ref 70–99)
GLUCOSE BLDC GLUCOMTR-MCNC: 128 MG/DL — HIGH (ref 70–99)
GLUCOSE BLDC GLUCOMTR-MCNC: 80 MG/DL — SIGNIFICANT CHANGE UP (ref 70–99)
GLUCOSE BLDC GLUCOMTR-MCNC: 83 MG/DL — SIGNIFICANT CHANGE UP (ref 70–99)
GLUCOSE SERPL-MCNC: 96 MG/DL — SIGNIFICANT CHANGE UP (ref 70–99)
HCT VFR BLD CALC: 30.4 % — LOW (ref 39–50)
HGB BLD-MCNC: 9 G/DL — LOW (ref 13–17)
MAGNESIUM SERPL-MCNC: 2.5 MG/DL — SIGNIFICANT CHANGE UP (ref 1.6–2.6)
MAGNESIUM SERPL-MCNC: 2.5 MG/DL — SIGNIFICANT CHANGE UP (ref 1.6–2.6)
MCHC RBC-ENTMCNC: 29.6 GM/DL — LOW (ref 32–36)
MCHC RBC-ENTMCNC: 32.6 PG — SIGNIFICANT CHANGE UP (ref 27–34)
MCV RBC AUTO: 110.1 FL — HIGH (ref 80–100)
NRBC # BLD: 1 /100 WBCS — HIGH (ref 0–0)
PHOSPHATE SERPL-MCNC: 3.3 MG/DL — SIGNIFICANT CHANGE UP (ref 2.5–4.5)
PLATELET # BLD AUTO: 174 K/UL — SIGNIFICANT CHANGE UP (ref 150–400)
POTASSIUM SERPL-MCNC: 4.3 MMOL/L — SIGNIFICANT CHANGE UP (ref 3.5–5.3)
POTASSIUM SERPL-SCNC: 4.3 MMOL/L — SIGNIFICANT CHANGE UP (ref 3.5–5.3)
RBC # BLD: 2.76 M/UL — LOW (ref 4.2–5.8)
RBC # FLD: 26.5 % — HIGH (ref 10.3–14.5)
SODIUM SERPL-SCNC: 137 MMOL/L — SIGNIFICANT CHANGE UP (ref 135–145)
TACROLIMUS SERPL-MCNC: 19.9 NG/ML — SIGNIFICANT CHANGE UP
WBC # BLD: 7.47 K/UL — SIGNIFICANT CHANGE UP (ref 3.8–10.5)
WBC # FLD AUTO: 7.47 K/UL — SIGNIFICANT CHANGE UP (ref 3.8–10.5)

## 2020-09-21 PROCEDURE — 99232 SBSQ HOSP IP/OBS MODERATE 35: CPT

## 2020-09-21 RX ADMIN — SODIUM CHLORIDE 3 MILLILITER(S): 9 INJECTION INTRAMUSCULAR; INTRAVENOUS; SUBCUTANEOUS at 21:37

## 2020-09-21 RX ADMIN — TACROLIMUS 4.5 MILLIGRAM(S): 5 CAPSULE ORAL at 20:06

## 2020-09-21 RX ADMIN — HUMAN INSULIN 5 UNIT(S): 100 INJECTION, SUSPENSION SUBCUTANEOUS at 17:40

## 2020-09-21 RX ADMIN — Medication 25 MILLIGRAM(S): at 06:08

## 2020-09-21 RX ADMIN — HEPARIN SODIUM 5000 UNIT(S): 5000 INJECTION INTRAVENOUS; SUBCUTANEOUS at 21:37

## 2020-09-21 RX ADMIN — Medication 125 MILLIGRAM(S): at 23:40

## 2020-09-21 RX ADMIN — Medication 125 MILLIGRAM(S): at 11:42

## 2020-09-21 RX ADMIN — Medication 5 MILLIGRAM(S): at 14:10

## 2020-09-21 RX ADMIN — Medication 3 UNIT(S): at 17:40

## 2020-09-21 RX ADMIN — PANTOPRAZOLE SODIUM 40 MILLIGRAM(S): 20 TABLET, DELAYED RELEASE ORAL at 06:08

## 2020-09-21 RX ADMIN — Medication 125 MILLIGRAM(S): at 06:08

## 2020-09-21 RX ADMIN — Medication 40 MILLIGRAM(S): at 17:39

## 2020-09-21 RX ADMIN — HUMAN INSULIN 5 UNIT(S): 100 INJECTION, SUSPENSION SUBCUTANEOUS at 06:07

## 2020-09-21 RX ADMIN — SODIUM CHLORIDE 3 MILLILITER(S): 9 INJECTION INTRAMUSCULAR; INTRAVENOUS; SUBCUTANEOUS at 05:17

## 2020-09-21 RX ADMIN — HEPARIN SODIUM 5000 UNIT(S): 5000 INJECTION INTRAVENOUS; SUBCUTANEOUS at 06:08

## 2020-09-21 RX ADMIN — Medication 25 MILLIGRAM(S): at 14:10

## 2020-09-21 RX ADMIN — Medication 25 MILLIGRAM(S): at 21:37

## 2020-09-21 RX ADMIN — Medication 3 UNIT(S): at 11:45

## 2020-09-21 RX ADMIN — Medication 5 MILLIGRAM(S): at 06:08

## 2020-09-21 RX ADMIN — TACROLIMUS 4.5 MILLIGRAM(S): 5 CAPSULE ORAL at 07:54

## 2020-09-21 RX ADMIN — Medication 10 MILLIGRAM(S): at 07:54

## 2020-09-21 RX ADMIN — Medication 40 MILLIGRAM(S): at 06:08

## 2020-09-21 RX ADMIN — Medication 125 MILLIGRAM(S): at 17:41

## 2020-09-21 RX ADMIN — Medication 3 UNIT(S): at 07:53

## 2020-09-21 RX ADMIN — HEPARIN SODIUM 5000 UNIT(S): 5000 INJECTION INTRAVENOUS; SUBCUTANEOUS at 14:10

## 2020-09-21 RX ADMIN — SODIUM CHLORIDE 3 MILLILITER(S): 9 INJECTION INTRAMUSCULAR; INTRAVENOUS; SUBCUTANEOUS at 14:06

## 2020-09-21 NOTE — PROGRESS NOTE ADULT - ASSESSMENT
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 s/p EGD with esophagitis and gastritis. Developed Klebsiella oxytoca bacteremia requiring transfer to CTU. Clinically improving, transferred to [facility], finished 10-day of ceftriaxone, however, developed severe C.diff colitis on Vancomycin 500mg q6h PO and IV Flagyl. He had RHC on 8/25 and found to have high filling pressure so transferred to CTU again.    #C.diff colitis with NORMAN- C.diff PCR detected (8/23). Repeat CT on 8/30 without evidence of toxic megacolon.   - clinically  has hit a plateau and without significant improvement but also without deterioration  tolerating minimal amounts of oral food  continue po vanco but decrease dose to 125mg QID to begin a taper  Please repeat abdominal Xray   start Marinol to improve appetite  Asked stewardship program to investigate if Bezlotoxamab 10mg/kg as a single dose could be obtrained to help reduce recurrence of infection in this immunocompromised patient       #OI prophylaxis-  -Was previously receiving high dose steroids  -CMV viral load(8/17, 8/26): neg, needs repeat specimen  -Valcyte and Bactrim d/c'ed on 8/17 due to leukopenia  -Galactomann<0.5, Fungitell<31  -Will consider bactrim or Atovaquone for PCP ppx when able to tolerated PO- please restart OI prophylaxis at this point    Vancomycin taper after 1 week of qid will drop to tid Vancomycin    Gary Lujan MD  [phone]  After 5pm/weekends [phone]

## 2020-09-21 NOTE — CONSULT NOTE ADULT - REASON FOR ADMISSION
SOB/anemia

## 2020-09-21 NOTE — CHART NOTE - NSCHARTNOTEFT_GEN_A_CORE
Nutrition Follow Up Note    Patient seen for: Nutritional follow up, calorie count review    Source:  RN, medical record, CTU team     Chart reviewed, events noted.  70 year old male with PMHx of NICM, s/p HM2 LVAD and OHT in 2018, with known coronary fistula. Recent admission for hemolytic anemia, now admitted for hyperglycemia and GIB. EGD and capsule study negative. Pt now C-Diff colitis w/o toxic megacolon. Required TPN for acute severe malnutrition in the setting of Colitis and ileus.  TPN has now been discontinued since .   Pt receiving Low fiber diet + nocturnal EN via NGT.     Diet: consistent CHO, Low fiber, Glucerna x3 daily + EN via NGT  Glucerna 1.5 @ 80ml/[id number] provided nocturnally.  EN provides: 1440kcals and 79.2gm protein (19kcals/kg and 1.0gm pro/kg based on dosing Wt: 75.4kg)  EN provision: 100% x3 days.     Patient reports: Pt seen in bed, reports feeling well but c/o about appetite. States he is concerned regarding his lack of appetite despite ordering and receiving some of his favorite foods. Sips on Glucerna throughout the day, states he is likely only drinking about half of each.   Pt states GI distress is improving but is still having some nausea during the day.   Last BM today.     3 day calorie count review.   Day 1: 285 kcals and 14gm protein   Day 2: 335kcals and 14gm protein   Day 3: 116kclas and 4gm protein.    3 day average 245kcals and 10gm protein      PO intake: 3 Day calorie count represents severely poor PO intake. Will review with team. would consider adding Zofran as able to combat nausea and help promote PO intake.   Can continue Marinol as an appetite stimulant.      Source for PO intake: RN,       Daily Weight in k.9 (-21), Weight in k.8 (-19), Weight in k.5 (-18), Weight in k (-17), Weight in k.1 (-16), Weight in k.7 (-15)    Drug Dosing Weight  Weight (kg): 75.2 (17 Aug 2020 14:29)  BMI (kg/m2): 26 (17 Aug 2020 14:29)      Pertinent Medications: MEDICATIONS  (STANDING):  chlorhexidine 2% Cloths 1 Application(s) Topical <User Schedule>  dextrose 5%. 1000 milliLiter(s) (50 mL/Hr) IV Continuous <Continuous>  dextrose 50% Injectable 12.5 Gram(s) IV Push once  dextrose 50% Injectable 25 Gram(s) IV Push once  dextrose 50% Injectable 25 Gram(s) IV Push once  dronabinol 5 milliGRAM(s) Oral <User Schedule>  furosemide   Injectable 40 milliGRAM(s) IV Push every 12 hours  heparin   Injectable 5000 Unit(s) SubCutaneous every 8 hours  hydrALAZINE 25 milliGRAM(s) Oral every 8 hours  insulin lispro (HumaLOG) corrective regimen sliding scale   SubCutaneous every 6 hours  insulin lispro Injectable (HumaLOG) 3 Unit(s) SubCutaneous three times a day before meals  insulin NPH human recombinant 5 Unit(s) SubCutaneous every 12 hours  pantoprazole    Tablet 40 milliGRAM(s) Oral before breakfast  predniSONE   Tablet 10 milliGRAM(s) Oral <User Schedule>  sodium chloride 0.9% lock flush 3 milliLiter(s) IV Push every 8 hours  tacrolimus 4.5 milliGRAM(s) Oral <User Schedule>  vancomycin    Solution 125 milliGRAM(s) Oral every 6 hours    MEDICATIONS  (PRN):  benzocaine 15 mG/menthol 3.6 mG (Sugar-Free) Lozenge 1 Lozenge Oral every 6 hours PRN Sore Throat  dextrose 40% Gel 15 Gram(s) Oral once PRN Blood Glucose LESS THAN 70 milliGRAM(s)/deciliter  glucagon  Injectable 1 milliGRAM(s) IntraMuscular once PRN Glucose LESS THAN 70 milligrams/deciliter  sodium chloride 0.9% lock flush 10 milliLiter(s) IV Push every 1 hour PRN Pre/post blood products, medications, blood draw, and to maintain line patency      LABS:       Finger Sticks:  POCT Blood Glucose.: 106 mg/dL ( @ 07:34)  POCT Blood Glucose.: 117 mg/dL ( @ 05:58)  POCT Blood Glucose.: 102 mg/dL ( @ 23:58)  POCT Blood Glucose.: 117 mg/dL ( @ 18:02)  POCT Blood Glucose.: 141 mg/dL ( @ 11:55)      Skin per nursing documentation: intact  Edema: +2 tara ankle and foot edema     Estimated Needs: based on dosing Wt: 75.2 Kg,   Energy: (25-30kcal/kg): 1880-2256kcal   Protein:  (1.4-1.6g protein/kg): 105-135g protein    Previous Nutrition Diagnosis: altered nutrition lab values  Nutrition Diagnosis is: defer at this time     Previous Nutrition Diagnosis: inadequate oral intake  Nutrition Diagnosis is: on going at this time.     Previous Nutrition Diagnosis: acute severe protein calorie malnutrition  Nutrition Diagnosis: remains appropriate, ongoing diet advancement     New Nutrition Diagnosis: None     Recommended Interventions:   1. Continue consistent CHO, Low Fiber,  2. Continue Glucerna to 3 x daily to further supplement PO Intake   3. Continue  Glucerna 1.5 @80ml/[id number] Provided nocturnally. Provides: 1440kcals and 79gm protein.  (19kcals/kg and 1.0gm pro/kg based on dosing Wt: 75.4kg). This represents 75% of estimated nutrient needs   4. Add Zofran for anti nausea    5. Continue Marinol as able to stimulant appetite.   6. Trend GI tolerance   7. Trend BG levels, renal indices, LFT's, electrolytes and triglycerides     Monitoring and Evaluation:   Continue to monitor nutritional intake, tolerance to diet prescription, weights, labs, skin integrity  RD remains available upon request and will follow up per protocol  Gabbi Flowers RD, CDN, [facility] Pager #[phone].

## 2020-09-21 NOTE — CONSULT NOTE ADULT - CONSULT REASON
Nutrition/TPN
Abdominal Distention
Anemia
Evaluate mood
Lacie
NORMAN
h/o hemolytic anemia presenting with drop in hemoglobin
h/o subclavian thrombus
ileus, cdiff+
left 2nd toe ulceration
pancytopenia post OHTx, hemolytic anemia on high dose steroids  possible UTI

## 2020-09-21 NOTE — PROGRESS NOTE ADULT - SUBJECTIVE AND OBJECTIVE BOX
VITAL SIGNS    Telemetry:      Vital Signs Last 24 Hrs  T(C): 36.6 (09-21-20 @ 06:05), Max: 36.7 (09-20-20 @ 14:01)  T(F): 97.9 (09-21-20 @ 06:05), Max: 98 (09-20-20 @ 14:01)  HR: 114 (09-21-20 @ 06:05) (100 - 114)  BP: 109/74 (09-21-20 @ 06:05) (109/74 - 122/93)  RR: 18 (09-21-20 @ 06:05) (18 - 18)  SpO2: 95% (09-21-20 @ 06:05) (95% - 95%)                   Daily     Daily         CAPILLARY BLOOD GLUCOSE      POCT Blood Glucose.: 117 mg/dL (21 Sep 2020 05:58)  POCT Blood Glucose.: 102 mg/dL (20 Sep 2020 23:58)  POCT Blood Glucose.: 117 mg/dL (20 Sep 2020 18:02)  POCT Blood Glucose.: 141 mg/dL (20 Sep 2020 11:55)  POCT Blood Glucose.: 119 mg/dL (20 Sep 2020 07:39)  POCT Blood Glucose.: 52 mg/dL (20 Sep 2020 07:38)                        PHYSICAL EXAM    Neurology: alert and oriented x 3, moves all extremities with no defecits  CV :  RRR  Sternal Wound :  CDI , Stable  Lungs:   CTA B/L  Abdomen: soft, nontender, nondistended, positive bowel sounds, last bowel movement   Extremities:                                            VITAL SIGNS    Telemetry:  sr   108    Vital Signs Last 24 Hrs  T(C): 36.6 (09-21-20 @ 06:05), Max: 36.7 (09-20-20 @ 14:01)  T(F): 97.9 (09-21-20 @ 06:05), Max: 98 (09-20-20 @ 14:01)  HR: 114 (09-21-20 @ 06:05) (100 - 114)  BP: 109/74 (09-21-20 @ 06:05) (109/74 - 122/93)  RR: 18 (09-21-20 @ 06:05) (18 - 18)  SpO2: 95% (09-21-20 @ 06:05) (95% - 95%)                   Daily     Daily         CAPILLARY BLOOD GLUCOSE      POCT Blood Glucose.: 117 mg/dL (21 Sep 2020 05:58)  POCT Blood Glucose.: 102 mg/dL (20 Sep 2020 23:58)  POCT Blood Glucose.: 117 mg/dL (20 Sep 2020 18:02)  POCT Blood Glucose.: 141 mg/dL (20 Sep 2020 11:55)  POCT Blood Glucose.: 119 mg/dL (20 Sep 2020 07:39)  POCT Blood Glucose.: 52 mg/dL (20 Sep 2020 07:38)                        PHYSICAL EXAM  S   No cjest pain  No sob"  Neurology: alert and oriented x 3, moves all extremities with no defecits  CV :  RRR  Sternal Wound :  CDI , Stable   healed  Lungs:   CTA B/L  Abdomen: soft, nontender, distended, positive bowel sounds, last bowel movement   9/21   Extremities:     plis one pedal edema

## 2020-09-21 NOTE — CONSULT NOTE ADULT - PROVIDER SPECIALTY LIST ADULT
Nutrition Support
Heme/Onc
Psychology
Gastroenterology
Gastroenterology
Infectious Disease
Podiatry
Surgery
Surgery
Nephrology
Transplant Cardiology

## 2020-09-21 NOTE — CONSULT NOTE ADULT - SUBJECTIVE AND OBJECTIVE BOX
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 Patient was treated w/ prednisone and Rituximab. Tacro was discontinued and patient was also transitioned to everolimus  Admitted  6/16-6/19  for hyperglycemia.  Reported to transplant team having one done black tarry stool-  instructed to  present to [facility] for hemolytic anemia. Patient has been following with Hematology outpatient.  Denies CP  N/V diarrhea SOB. (11 Aug 2020 20:08)  Podiatry consulted for left 2nd toe ulceration.       PAST MEDICAL & SURGICAL HISTORY:  H/O hemolytic anemia    H/O autoimmune hemolytic anemia    Knee pain, right    HLD (hyperlipidemia)    Former smoker    DVT of upper extremity (deep vein thrombosis)    Hepatitis C virus    GIB (gastrointestinal bleeding)    Ventricular fibrillation  s/p AICD    PAF (paroxysmal atrial fibrillation)  on xarelto    Non-Ischemic Cardiomyopathy    SVT (Supraventricular Tachycardia)    HTN    CHF (Congestive Heart Failure)    S/P right heart catheterization  biopsy multiple    H/O heart transplant  2/2018    Status post left hip replacement    History of Prior Ablation Treatment  for afib    AICD (Automatic Cardioverter/Defibrillator) Present  [employer] [place] with 1 [employer] lead4/1/09- explanted and replaced with [employer] 2 leads on 9/2/09        MEDICATIONS  (STANDING):  chlorhexidine 2% Cloths 1 Application(s) Topical <User Schedule>  dextrose 5%. 1000 milliLiter(s) (50 mL/Hr) IV Continuous <Continuous>  dextrose 50% Injectable 12.5 Gram(s) IV Push once  dextrose 50% Injectable 25 Gram(s) IV Push once  dextrose 50% Injectable 25 Gram(s) IV Push once  dronabinol 5 milliGRAM(s) Oral <User Schedule>  furosemide   Injectable 40 milliGRAM(s) IV Push every 12 hours  heparin   Injectable 5000 Unit(s) SubCutaneous every 8 hours  hydrALAZINE 25 milliGRAM(s) Oral every 8 hours  insulin lispro (HumaLOG) corrective regimen sliding scale   SubCutaneous every 6 hours  insulin lispro Injectable (HumaLOG) 3 Unit(s) SubCutaneous three times a day before meals  insulin NPH human recombinant 5 Unit(s) SubCutaneous every 12 hours  pantoprazole    Tablet 40 milliGRAM(s) Oral before breakfast  predniSONE   Tablet 10 milliGRAM(s) Oral <User Schedule>  sodium chloride 0.9% lock flush 3 milliLiter(s) IV Push every 8 hours  tacrolimus 4.5 milliGRAM(s) Oral <User Schedule>  vancomycin    Solution 125 milliGRAM(s) Oral every 6 hours    MEDICATIONS  (PRN):  benzocaine 15 mG/menthol 3.6 mG (Sugar-Free) Lozenge 1 Lozenge Oral every 6 hours PRN Sore Throat  dextrose 40% Gel 15 Gram(s) Oral once PRN Blood Glucose LESS THAN 70 milliGRAM(s)/deciliter  glucagon  Injectable 1 milliGRAM(s) IntraMuscular once PRN Glucose LESS THAN 70 milligrams/deciliter  sodium chloride 0.9% lock flush 10 milliLiter(s) IV Push every 1 hour PRN Pre/post blood products, medications, blood draw, and to maintain line patency      Allergies    No Known Allergies    Intolerances        VITALS:    Vital Signs Last 24 Hrs  T(C): 36.4 (21 Sep 2020 11:51), Max: 36.6 (20 Sep 2020 19:31)  T(F): 97.5 (21 Sep 2020 11:51), Max: 97.9 (20 Sep 2020 19:31)  HR: 112 (21 Sep 2020 14:09) (100 - 121)  BP: 113/68 (21 Sep 2020 14:09) (102/72 - 122/93)  BP(mean): 86 (21 Sep 2020 06:05) (86 - 89)  RR: 18 (21 Sep 2020 11:51) (18 - 18)  SpO2: 99% (21 Sep 2020 11:51) (95% - 99%)    LABS:                          9.0    7.47  )-----------( 174      ( 21 Sep 2020 10:30 )             30.4       09-21    137  |  98  |  37<H>  ----------------------------<  96  4.3   |  30  |  1.24    Ca    8.8      21 Sep 2020 10:30  Phos  3.3     09-21  Mg     2.5     09-21        CAPILLARY BLOOD GLUCOSE      POCT Blood Glucose.: 128 mg/dL (21 Sep 2020 11:35)  POCT Blood Glucose.: 106 mg/dL (21 Sep 2020 07:34)  POCT Blood Glucose.: 117 mg/dL (21 Sep 2020 05:58)  POCT Blood Glucose.: 102 mg/dL (20 Sep 2020 23:58)  POCT Blood Glucose.: 117 mg/dL (20 Sep 2020 18:02)          LOWER EXTREMITY PHYSICAL EXAM:    Vasular: DP/PT 2/4, B/L, CFT <3 seconds B/L, Temperature gradient WNL, B/L  Neuro: Epicritic sensation intact to the level of the digits, B/L  Musculoskeletal/Ortho: Severe HAV deformity, R>L. no pain with palpation or range of motion fo R hallux, left 2nd toe rigid hammertoe  Skin: Small area of hyperpigmentation medial 1st MTPJ, R foot. No erythema, no edema, preulcerative lesion left 2nd toe, no open lesions, toenails thickened dystrophic and mycotic x 10

## 2020-09-21 NOTE — PROGRESS NOTE ADULT - ASSESSMENT
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 Patient was treated w/ prednisone and Rituximab. Tacro was discontinued and patient was also transitioned to everolimus  Admitted  6/16-6/19  for hyperglycemia.  Reported to transplant team having one done black tarry stool-  instructed to  present to [facility] for hemolytic anemia. Patient has been following with Hematology outpatient.  Denies CP  N/V diarrhea SOB. (11 Aug 2020 20:08)  /11 Admitted to [facility] with symptomatic anemia  8/13 EGD for investigation of tarry stools  8/15 SB capsule: stomach & SB lesions, likely ulcers.  8/17 EGD/push enteroscopy w/ angioectasias/erosions in small bowel. Gastropathy and esophagitis. Ulcer seen on VCE most likely scope trauma.    8/18 VSS transferred back to floor.   8/20 VS 9.0/28.4 stable Gastric biopsy results LA Grade A esophagitis.  - Acute gastritis. Biopsies taken to r/o CMV, No ulceration seen despite finding on capsule endoscopy - likely 2/2 scope  trauma that has since resolved. Pathology pending.  8/21 VSS H/H  8.1/25.7  trending down will monitor prednisone taper 30 mg today. Procardia 120 initiated today RHC biopsy Monday.   8/22 VVS; Patient reports loose stools x 2-3 days with 1 episode today.  Stool sent for C-dif and GI PCR. Abdominal X-ray revealed: dilated loops of bowel. Abdomen distended.  Patient denies N/V. GI called to re-evaluate. Continue with current medication regimen.  Awaiting for upcoming cardiac biopsy on Monday 8/24.  8/23 VVS; creat up to 2.28, repeating CMP, TTE ordered Bladder scan   8/24 cardiac bx cancelled. elev creat  to 2.74   cardio renal cslt called, + CDIF  increase vanco to 500 q6 ID following  8/25 VSS: RHC today as per transplant team; transfuse 2 units PRBC today as per Dr. Viramontes; continue vanco for c-diff; transfer patient to CTU after cath today   8/26. Dieretic challenge with good response   8/27: Downgraded to the floor then upgraded to the CTU again for concerning of abd distention   8/28 CT ABD & Pelvis reveals pancolitis  8/30: repeat CT scan of abd, gen surgery called to re-evaluated pt.  8/31: RUE duplex negative for DVT  9/4 NGT clamped, minimal residual. NGT removed. started sips   9/6 tolerating clear liquid diet  9/8 Bronch  9/9 1 prbc   9/10 increased tube feeds to 20cc/hr and tpn decreased to 63cc/hr from 125cc/hr   9/12 TPN discontinued  9/14 Diet advanced, tube feeds at 75% goal, central line removed and midline inserted.  9/14 pt completed approp 75 % of meal.   pt ambulated to the door this afternoon  9/15 Pt tolerating PO diet with supplemental tube feeds. calorie count iniated  9/16 VSS OOB in chair no acute distress transferred to [facility] nocturnal tube feeds 80 cc/hrx12 - RUE + edema Tacro level 7.2  PT disposition  to rehab.  9/177 VSS flat affect dronabinol added appetite stimulant at goal with Glucerna nocturnal feeds.. Rosas removed this am  9/18 VSS Remains on Glucerna TF, failed calorie count, Glucerna increased to 3 daily. Loose BM Improving--continue on Vanco 125mg QID taper as per ID.   9/19 VVS; RUE edema --> Vascular surgery consult called per CHF for prior occlusion of right subclavian.  Right Midline D/C'd . No evidence of DVT. CT Venogram of RUE to evaluate patency of subclavian  9/20 HD stable, cont PO vanco. Tacro level 9.4.      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 Patient was treated w/ prednisone and Rituximab. Tacro was discontinued and patient was also transitioned to everolimus  Admitted  6/16-6/19  for hyperglycemia.  Reported to transplant team having one done black tarry stool-  instructed to  present to [facility] for hemolytic anemia. Patient has been following with Hematology outpatient.  Denies CP  N/V diarrhea SOB. (11 Aug 2020 20:08)  /11 Admitted to [facility] with symptomatic anemia  8/13 EGD for investigation of tarry stools  8/15 SB capsule: stomach & SB lesions, likely ulcers.  8/17 EGD/push enteroscopy w/ angioectasias/erosions in small bowel. Gastropathy and esophagitis. Ulcer seen on VCE most likely scope trauma.    8/18 VSS transferred back to floor.   8/20 VS 9.0/28.4 stable Gastric biopsy results LA Grade A esophagitis.  - Acute gastritis. Biopsies taken to r/o CMV, No ulceration seen despite finding on capsule endoscopy - likely 2/2 scope  trauma that has since resolved. Pathology pending.  8/21 VSS H/H  8.1/25.7  trending down will monitor prednisone taper 30 mg today. Procardia 120 initiated today RHC biopsy Monday.   8/22 VVS; Patient reports loose stools x 2-3 days with 1 episode today.  Stool sent for C-dif and GI PCR. Abdominal X-ray revealed: dilated loops of bowel. Abdomen distended.  Patient denies N/V. GI called to re-evaluate. Continue with current medication regimen.  Awaiting for upcoming cardiac biopsy on Monday 8/24.  8/23 VVS; creat up to 2.28, repeating CMP, TTE ordered Bladder scan   8/24 cardiac bx cancelled. elev creat  to 2.74   cardio renal cslt called, + CDIF  increase vanco to 500 q6 ID following  8/25 VSS: RHC today as per transplant team; transfuse 2 units PRBC today as per Dr. Viramontes; continue vanco for c-diff; transfer patient to CTU after cath today   8/26. Dieretic challenge with good response   8/27: Downgraded to the floor then upgraded to the CTU again for concerning of abd distention   8/28 CT ABD & Pelvis reveals pancolitis  8/30: repeat CT scan of abd, gen surgery called to re-evaluated pt.  8/31: RUE duplex negative for DVT  9/4 NGT clamped, minimal residual. NGT removed. started sips   9/6 tolerating clear liquid diet  9/8 Bronch  9/9 1 prbc   9/10 increased tube feeds to 20cc/hr and tpn decreased to 63cc/hr from 125cc/hr   9/12 TPN discontinued  9/14 Diet advanced, tube feeds at 75% goal, central line removed and midline inserted.  9/14 pt completed approp 75 % of meal.   pt ambulated to the door this afternoon  9/15 Pt tolerating PO diet with supplemental tube feeds. calorie count iniated  9/16 VSS OOB in chair no acute distress transferred to [address] [place] [address] nocturnal tube feeds 80 cc/hrx12 - RUE + edema Tacro level 7.2  PT disposition  to rehab.  9/177 VSS flat affect dronabinol added appetite stimulant at goal with Glucerna nocturnal feeds.. Rosas removed this am  9/18 VSS Remains on Glucerna TF, failed calorie count, Glucerna increased to 3 daily. Loose BM Improving--continue on Vanco 125mg QID taper as per ID.   9/19 VVS; RUE edema --> Vascular surgery consult called per CHF for prior occlusion of right subclavian.  Right Midline D/C'd . No evidence of DVT. CT Venogram of RUE to evaluate patency of subclavian  9/20 HD stable, cont PO vanco. Tacro level 9.4.   9/21    OOB to chait   VSS  Loose BM  this am   on vanco

## 2020-09-21 NOTE — PROGRESS NOTE ADULT - PROBLEM SELECTOR PLAN 5
Vascular surgery consult called per CHF for prior occlusion of right subclavian.    No evidence of DVT on Venous duple study

## 2020-09-21 NOTE — CONSULT NOTE ADULT - CONSULT REQUESTED DATE/TIME
02-Sep-2020 09:47
12-Aug-2020 13:45
12-Aug-2020 14:33
13-Aug-2020
13-Aug-2020 07:59
19-Sep-2020 12:26
21-Sep-2020 15:09
24-Aug-2020 16:26
25-Aug-2020 06:54
28-Aug-2020 10:37
31-Aug-2020 18:42

## 2020-09-21 NOTE — PROGRESS NOTE ADULT - ASSESSMENT
Mood improved. Less anxious but still worried about recovery. Acknowledges that he has made some progress. Appetite improved. Reports he ate well for breakfast and lunch today. Working with PT, today took steps in room and sat in chair for several hours. Sleeping well at night, takes naps during day. Endorsed some periods of low mood related to hospitalization. Feels talking to others helps. Reviewed strategies for coping with stress. Receptive to support and validation.    Mood improved. Less anxious but still wor    Dx: Adjustment disorder; unspecified F43.20; r/o delirium F05     Recommendations:   Explain all information in simple, concise language with teach back to ensure understanding  Include support system in all education   Holistic RN  Behavioral Cardiology will continue to follow

## 2020-09-21 NOTE — CONSULT NOTE ADULT - ASSESSMENT
69 y/o male pt with left 2nd toe preulcerative lesion and dystrophic, thickened, mycotic toenails x10  - pt seen and evaluated  - left 2nd toe preulcerative lesion, no open wounds, no sings of infection  - toenails debrided x10 using sterile nippers  - pt tolerated procedure with no complications  - follow up at the office for routine care after discharge (call  for appointment)

## 2020-09-21 NOTE — PROGRESS NOTE ADULT - SUBJECTIVE AND OBJECTIVE BOX
INFECTIOUS DISEASES FOLLOW UP-- Sujey Lujan  [phone]    This is a follow up note for this  70yMale with  Anemia  severe C.diff colitis  requires NG feeds- but reporting slow improvement in appetite        ROS:  CONSTITUTIONAL:  No fever, poor appetite  CARDIOVASCULAR:  No chest pain or palpitations  RESPIRATORY:  No dyspnea  GASTROINTESTINAL:  No nausea, vomiting, diarrhea, or abdominal pain- one BM by 3pm today  GENITOURINARY:  No dysuria  NEUROLOGIC:  No headache,     Allergies    No Known Allergies    Intolerances        ANTIBIOTICS/RELEVANT:  antimicrobials  vancomycin    Solution 125 milliGRAM(s) Oral every 6 hours    immunologic:  tacrolimus 4.5 milliGRAM(s) Oral <User Schedule>    OTHER:  benzocaine 15 mG/menthol 3.6 mG (Sugar-Free) Lozenge 1 Lozenge Oral every 6 hours PRN  chlorhexidine 2% Cloths 1 Application(s) Topical <User Schedule>  dextrose 40% Gel 15 Gram(s) Oral once PRN  dextrose 5%. 1000 milliLiter(s) IV Continuous <Continuous>  dextrose 50% Injectable 12.5 Gram(s) IV Push once  dextrose 50% Injectable 25 Gram(s) IV Push once  dextrose 50% Injectable 25 Gram(s) IV Push once  dronabinol 5 milliGRAM(s) Oral <User Schedule>  furosemide   Injectable 40 milliGRAM(s) IV Push every 12 hours  glucagon  Injectable 1 milliGRAM(s) IntraMuscular once PRN  heparin   Injectable 5000 Unit(s) SubCutaneous every 8 hours  hydrALAZINE 25 milliGRAM(s) Oral every 8 hours  insulin lispro (HumaLOG) corrective regimen sliding scale   SubCutaneous every 6 hours  insulin lispro Injectable (HumaLOG) 3 Unit(s) SubCutaneous three times a day before meals  insulin NPH human recombinant 5 Unit(s) SubCutaneous every 12 hours  pantoprazole    Tablet 40 milliGRAM(s) Oral before breakfast  predniSONE   Tablet 10 milliGRAM(s) Oral <User Schedule>  sodium chloride 0.9% lock flush 10 milliLiter(s) IV Push every 1 hour PRN  sodium chloride 0.9% lock flush 3 milliLiter(s) IV Push every 8 hours      Objective:  Vital Signs Last 24 Hrs  T(C): 36.4 (21 Sep 2020 11:51), Max: 36.6 (21 Sep 2020 06:05)  T(F): 97.5 (21 Sep 2020 11:51), Max: 97.9 (21 Sep 2020 06:05)  HR: 109 (21 Sep 2020 16:18) (109 - 121)  BP: 106/71 (21 Sep 2020 16:18) (102/72 - 113/78)  BP(mean): 86 (21 Sep 2020 06:05) (86 - 89)  RR: 18 (21 Sep 2020 11:51) (18 - 18)  SpO2: 99% (21 Sep 2020 11:51) (95% - 99%)    PHYSICAL EXAM:  Constitutional:no acute distress  Eyes:WALT, EOMI  Ear/Nose/Throat: no oral lesions, NG in place	  Respiratory: clear BL  Cardiovascular: S1S2  Gastrointestinal:soft, (+) BS, no tenderness  Extremities:RUE swelling  No Lymphadenopathy  IV sites not inflammed.    LABS:                        9.0    7.47  )-----------( 174      ( 21 Sep 2020 10:30 )             30.4     09-21    137  |  98  |  37<H>  ----------------------------<  96  4.3   |  30  |  1.24    Ca    8.8      21 Sep 2020 10:30  Phos  3.3     09-21  Mg     2.5     09-21            MICROBIOLOGY:            RECENT CULTURES:      RADIOLOGY & ADDITIONAL STUDIES:    < from: CT Chest w/ IV Cont (09.19.20 @ 19:09) >  IMPRESSION:    No evidence of central venous thrombus.    Diminutive right brachiocephalic vein with its confluence with the left brachiocephalic vein not identified. Correlate with surgical history.    Tubular density within the left brachiocephalic vein as at CT 8/28/2020 suggestive of a fibrin sheath.    Since 8/28/2020, unchanged mucous/secretions within the bronchus intermedius with complete atelectasis of the right middle and lower lobes.    < end of copied text >

## 2020-09-21 NOTE — CONSULT NOTE ADULT - CONSULT REQUESTED BY NAME
CTICU
Dr Parker
Dr. Parker
Dr. Parker
Dr. Ti Parker
Dr. Ti Parker
Ti Parker
Transplant Cardiology
Vamshi Viramontes MD
primary
Dr Parker

## 2020-09-21 NOTE — PROGRESS NOTE ADULT - SUBJECTIVE AND OBJECTIVE BOX
Behavioral Cardiology Psychological Assessment     History of present illness: Mr. Baez is a 70 year old man with history of dilated nonischemic cardiomyopathy, s/p OHT 2018, CKD III, HCV s/p treatment, and recently diagnosed autoimmune hemolytic anemia who is admitted with symptomatic anemia with Hgb of 6.6. Found to have chronic gastritis and small bowel ectasias. Has worsening abdominal distention with finding of ileus. CT scan with pancolitis. Started on IVIG , rectal vanc and Eravacylcine on .    Social history: , lives in a private house he owns in [place]. His younger brother (Rivas Davey) lives with him. Pt’s wife   and his only son was killed 5 years ago. He has an 17 y/o granddaughter that lives nearby with her mother and just started college this week. Reports a close relationship with his siblings (6 brothers and 1 sister) all but one brother live in [place]. Identified his adopted "godbrother" (Nawaf Barahona [phone]) as primary support person and HCP. Identified a close friend (Tahira) who lives close by as additional support and alternate HCP; she is very involved in his care. His brother (Rivas Davey age 45) lives with him. Mother  (age 79); father . He has 2 daughters from a previous relationship but is not part of their lives. Worked as a  at [employer] for 40+ years; retired 2 years ago. On disability. Information obtained from patient and chart. Education: 10th Grade (limited literacy skills).     Substance use:   Tobacco: Former, quit 50 years ago, smoked 2 cigarettes/day for 2-3 years.   Alcohol: Former alcohol use; stopped 6 yrs ago; drank 4-5 drinks of rum 5 days/week for 40 yrs.   Drug: Past occasional marijuana use for many years ago; no other nonprescription drug use.     Past psychiatric history: In past experienced depression related to bereavement issues following wife’s death () and son’s death (). Never sought treatment. Denies any other psychiatric issues. No history of s/a. No inpatient psychiatric admissions.     Psychological assessment: Mood improved. Less anxious but still worried about recovery. Acknowledges that he has made some progress. Appetite improved. Reports he ate well for breakfast and lunch today. Working with PT, today took steps in room and sat in chair for several hours. Sleeping well at night, takes naps during day. Endorsed some periods of low mood related to hospitalization. Feels talking to others helps. Reviewed strategies for coping with stress. Receptive to support and validation.      Mental status exam: Seen resting in bed. Pleasant and cooperative, well related with good eye contact. Awake, oriented x2-3 (unable to identify date). Speech normal rate/volume. Thought process goal directed. No evidence of any psychosis, jona, delusions. Mood "doing better." Affect less anxious. No s/i. Insight and judgment adequate.

## 2020-09-21 NOTE — CHART NOTE - NSCHARTNOTEFT_GEN_A_CORE
Wound Care Consult Note:    Request for wound care consult received for foot wound and deferred to wound care team podiatrist, Dr. Davis. Will defer to Dr. Davis for management.    Shauna Lujan NP-C, [facility] [id number]

## 2020-09-22 LAB
ANION GAP SERPL CALC-SCNC: 10 MMOL/L — SIGNIFICANT CHANGE UP (ref 5–17)
BUN SERPL-MCNC: 35 MG/DL — HIGH (ref 7–23)
CALCIUM SERPL-MCNC: 8.8 MG/DL — SIGNIFICANT CHANGE UP (ref 8.4–10.5)
CHLORIDE SERPL-SCNC: 98 MMOL/L — SIGNIFICANT CHANGE UP (ref 96–108)
CO2 SERPL-SCNC: 29 MMOL/L — SIGNIFICANT CHANGE UP (ref 22–31)
CREAT SERPL-MCNC: 1.31 MG/DL — HIGH (ref 0.5–1.3)
GLUCOSE BLDC GLUCOMTR-MCNC: 100 MG/DL — HIGH (ref 70–99)
GLUCOSE BLDC GLUCOMTR-MCNC: 103 MG/DL — HIGH (ref 70–99)
GLUCOSE BLDC GLUCOMTR-MCNC: 109 MG/DL — HIGH (ref 70–99)
GLUCOSE BLDC GLUCOMTR-MCNC: 111 MG/DL — HIGH (ref 70–99)
GLUCOSE BLDC GLUCOMTR-MCNC: 93 MG/DL — SIGNIFICANT CHANGE UP (ref 70–99)
GLUCOSE SERPL-MCNC: 95 MG/DL — SIGNIFICANT CHANGE UP (ref 70–99)
HCT VFR BLD CALC: 33.9 % — LOW (ref 39–50)
HGB BLD-MCNC: 9.6 G/DL — LOW (ref 13–17)
MAGNESIUM SERPL-MCNC: 2.5 MG/DL — SIGNIFICANT CHANGE UP (ref 1.6–2.6)
MCHC RBC-ENTMCNC: 28.3 GM/DL — LOW (ref 32–36)
MCHC RBC-ENTMCNC: 31.2 PG — SIGNIFICANT CHANGE UP (ref 27–34)
MCV RBC AUTO: 110.1 FL — HIGH (ref 80–100)
NRBC # BLD: 0 /100 WBCS — SIGNIFICANT CHANGE UP (ref 0–0)
PLATELET # BLD AUTO: 210 K/UL — SIGNIFICANT CHANGE UP (ref 150–400)
POTASSIUM SERPL-MCNC: 4.8 MMOL/L — SIGNIFICANT CHANGE UP (ref 3.5–5.3)
POTASSIUM SERPL-SCNC: 4.8 MMOL/L — SIGNIFICANT CHANGE UP (ref 3.5–5.3)
RBC # BLD: 3.08 M/UL — LOW (ref 4.2–5.8)
RBC # FLD: 25.8 % — HIGH (ref 10.3–14.5)
SODIUM SERPL-SCNC: 137 MMOL/L — SIGNIFICANT CHANGE UP (ref 135–145)
TACROLIMUS SERPL-MCNC: 8.6 NG/ML — SIGNIFICANT CHANGE UP
WBC # BLD: 7.48 K/UL — SIGNIFICANT CHANGE UP (ref 3.8–10.5)
WBC # FLD AUTO: 7.48 K/UL — SIGNIFICANT CHANGE UP (ref 3.8–10.5)

## 2020-09-22 PROCEDURE — 99233 SBSQ HOSP IP/OBS HIGH 50: CPT

## 2020-09-22 PROCEDURE — 99232 SBSQ HOSP IP/OBS MODERATE 35: CPT

## 2020-09-22 PROCEDURE — 99232 SBSQ HOSP IP/OBS MODERATE 35: CPT | Mod: GC

## 2020-09-22 RX ADMIN — PANTOPRAZOLE SODIUM 40 MILLIGRAM(S): 20 TABLET, DELAYED RELEASE ORAL at 05:48

## 2020-09-22 RX ADMIN — HEPARIN SODIUM 5000 UNIT(S): 5000 INJECTION INTRAVENOUS; SUBCUTANEOUS at 13:25

## 2020-09-22 RX ADMIN — HEPARIN SODIUM 5000 UNIT(S): 5000 INJECTION INTRAVENOUS; SUBCUTANEOUS at 05:46

## 2020-09-22 RX ADMIN — SODIUM CHLORIDE 3 MILLILITER(S): 9 INJECTION INTRAMUSCULAR; INTRAVENOUS; SUBCUTANEOUS at 05:48

## 2020-09-22 RX ADMIN — Medication 125 MILLIGRAM(S): at 18:43

## 2020-09-22 RX ADMIN — Medication 25 MILLIGRAM(S): at 13:25

## 2020-09-22 RX ADMIN — SODIUM CHLORIDE 3 MILLILITER(S): 9 INJECTION INTRAMUSCULAR; INTRAVENOUS; SUBCUTANEOUS at 13:26

## 2020-09-22 RX ADMIN — Medication 5 MILLIGRAM(S): at 09:00

## 2020-09-22 RX ADMIN — HUMAN INSULIN 5 UNIT(S): 100 INJECTION, SUSPENSION SUBCUTANEOUS at 20:23

## 2020-09-22 RX ADMIN — TACROLIMUS 4.5 MILLIGRAM(S): 5 CAPSULE ORAL at 20:05

## 2020-09-22 RX ADMIN — SODIUM CHLORIDE 3 MILLILITER(S): 9 INJECTION INTRAMUSCULAR; INTRAVENOUS; SUBCUTANEOUS at 22:37

## 2020-09-22 RX ADMIN — Medication 25 MILLIGRAM(S): at 05:47

## 2020-09-22 RX ADMIN — Medication 5 MILLIGRAM(S): at 13:19

## 2020-09-22 RX ADMIN — Medication 10 MILLIGRAM(S): at 09:00

## 2020-09-22 RX ADMIN — Medication 40 MILLIGRAM(S): at 05:46

## 2020-09-22 RX ADMIN — Medication 125 MILLIGRAM(S): at 05:48

## 2020-09-22 RX ADMIN — Medication 3 UNIT(S): at 19:04

## 2020-09-22 RX ADMIN — Medication 40 MILLIGRAM(S): at 19:03

## 2020-09-22 RX ADMIN — HEPARIN SODIUM 5000 UNIT(S): 5000 INJECTION INTRAVENOUS; SUBCUTANEOUS at 22:36

## 2020-09-22 RX ADMIN — Medication 25 MILLIGRAM(S): at 22:37

## 2020-09-22 RX ADMIN — Medication 125 MILLIGRAM(S): at 13:26

## 2020-09-22 RX ADMIN — HUMAN INSULIN 5 UNIT(S): 100 INJECTION, SUSPENSION SUBCUTANEOUS at 05:46

## 2020-09-22 RX ADMIN — TACROLIMUS 4.5 MILLIGRAM(S): 5 CAPSULE ORAL at 09:00

## 2020-09-22 RX ADMIN — Medication 3 UNIT(S): at 08:58

## 2020-09-22 NOTE — PROGRESS NOTE ADULT - SUBJECTIVE AND OBJECTIVE BOX
INFECTIOUS DISEASES FOLLOW UP-- Sujey Lujan  [phone]    This is a follow up note for this  70yMale with  s/p OHTx 2 years ago admitted with sepsis followed by sever C.diff colitis    starting to feel better and eat by mouth        ROS:  CONSTITUTIONAL:  No fever, improving appetite  CARDIOVASCULAR:  No chest pain or palpitations  RESPIRATORY:  No dyspnea  GASTROINTESTINAL:  No nausea, vomiting, diarrhea, or abdominal pain  GENITOURINARY:  No dysuria  NEUROLOGIC:  No headache,     Allergies    No Known Allergies    Intolerances        ANTIBIOTICS/RELEVANT:  antimicrobials  vancomycin    Solution 125 milliGRAM(s) Oral every 6 hours    immunologic:  tacrolimus 4.5 milliGRAM(s) Oral <User Schedule>    OTHER:  benzocaine 15 mG/menthol 3.6 mG (Sugar-Free) Lozenge 1 Lozenge Oral every 6 hours PRN  chlorhexidine 2% Cloths 1 Application(s) Topical <User Schedule>  dextrose 40% Gel 15 Gram(s) Oral once PRN  dextrose 5%. 1000 milliLiter(s) IV Continuous <Continuous>  dextrose 50% Injectable 12.5 Gram(s) IV Push once  dextrose 50% Injectable 25 Gram(s) IV Push once  dextrose 50% Injectable 25 Gram(s) IV Push once  dronabinol 5 milliGRAM(s) Oral <User Schedule>  furosemide   Injectable 40 milliGRAM(s) IV Push every 12 hours  glucagon  Injectable 1 milliGRAM(s) IntraMuscular once PRN  heparin   Injectable 5000 Unit(s) SubCutaneous every 8 hours  hydrALAZINE 25 milliGRAM(s) Oral every 8 hours  insulin lispro (HumaLOG) corrective regimen sliding scale   SubCutaneous every 6 hours  insulin lispro Injectable (HumaLOG) 3 Unit(s) SubCutaneous three times a day before meals  insulin NPH human recombinant 5 Unit(s) SubCutaneous every 12 hours  pantoprazole    Tablet 40 milliGRAM(s) Oral before breakfast  predniSONE   Tablet 10 milliGRAM(s) Oral <User Schedule>  sodium chloride 0.9% lock flush 10 milliLiter(s) IV Push every 1 hour PRN  sodium chloride 0.9% lock flush 3 milliLiter(s) IV Push every 8 hours      Objective:  Vital Signs Last 24 Hrs  T(C): 36.8 (22 Sep 2020 12:36), Max: 36.8 (22 Sep 2020 12:36)  T(F): 98.3 (22 Sep 2020 12:36), Max: 98.3 (22 Sep 2020 12:36)  HR: 118 (22 Sep 2020 12:36) (110 - 120)  BP: 99/69 (22 Sep 2020 12:36) (99/69 - 123/75)  BP(mean): 79 (22 Sep 2020 12:36) (79 - 91)  RR: 18 (22 Sep 2020 12:36) (18 - 18)  SpO2: 96% (22 Sep 2020 12:36) (96% - 98%)    PHYSICAL EXAM:  Constitutional:no acute distress  Eyes:WALT, EOMI  Ear/Nose/Throat: no oral lesions, NG feeding tube in place	  Respiratory: clear BL  Cardiovascular: S1S2  Gastrointestinal:soft, (+) BS, no tenderness  Extremities:no e/e/c  No Lymphadenopathy  IV sites not inflammed.    LABS:                        9.6    7.48  )-----------( 210      ( 22 Sep 2020 09:22 )             33.9     09-22    137  |  98  |  35<H>  ----------------------------<  95  4.8   |  29  |  1.31<H>    Ca    8.8      22 Sep 2020 09:22  Phos  3.3     09-21  Mg     2.5     09-22            MICROBIOLOGY:            RECENT CULTURES:      RADIOLOGY & ADDITIONAL STUDIES:

## 2020-09-22 NOTE — PROGRESS NOTE ADULT - ASSESSMENT
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 s/p EGD with esophagitis and gastritis. Developed Klebsiella oxytoca bacteremia requiring transfer to CTU. Clinically improving, transferred to [facility], finished 10-day of ceftriaxone, however, developed severe C.diff colitis on Vancomycin 500mg q6h PO and IV Flagyl. He had RHC on 8/25 and found to have high filling pressure so transferred to CTU again.    #C.diff colitis with NORMAN- C.diff PCR detected (8/23). Repeat CT on 8/30 without evidence of toxic megacolon.   - clinically  has hit a plateau and without significant improvement but also without deterioration  tolerating minimal amounts of oral food  continue po vanco but dose to 125mg QID  through 9/24 followed by Vanco 125mg tid for one week followed by Vanco 125mg bid x 1 week then Vancomycin 125mg QD for a week  continue Marinol to improve appetite  Asked stewardship program to investigate if Bezlotoxamab 10mg/kg as a single dose could be obtained to help reduce recurrence of infection in this immunocompromised patient- it is possible but carries a significant fluid burden and may not be feasible from a cardiac standpoint       #OI prophylaxis-  -Was previously receiving high dose steroids  -CMV viral load(8/17, 8/26): neg, needs repeat specimen  -Valcyte and Bactrim d/c'ed on 8/17 due to leukopenia  -Galactomann<0.5, Fungitell<31  -Steroids being tapered currently 10mg/day prednisone        Gary Lujan MD  [phone]  After 5pm/weekends [phone]

## 2020-09-22 NOTE — PROGRESS NOTE ADULT - SUBJECTIVE AND OBJECTIVE BOX
Interval History:  feels better  abdomen less distended  RUE still swollen  eating well     Medications:  benzocaine 15 mG/menthol 3.6 mG (Sugar-Free) Lozenge 1 Lozenge Oral every 6 hours PRN  chlorhexidine 2% Cloths 1 Application(s) Topical <User Schedule>  dextrose 40% Gel 15 Gram(s) Oral once PRN  dextrose 5%. 1000 milliLiter(s) IV Continuous <Continuous>  dextrose 50% Injectable 12.5 Gram(s) IV Push once  dextrose 50% Injectable 25 Gram(s) IV Push once  dextrose 50% Injectable 25 Gram(s) IV Push once  dronabinol 5 milliGRAM(s) Oral <User Schedule>  furosemide   Injectable 40 milliGRAM(s) IV Push every 12 hours  glucagon  Injectable 1 milliGRAM(s) IntraMuscular once PRN  heparin   Injectable 5000 Unit(s) SubCutaneous every 8 hours  hydrALAZINE 25 milliGRAM(s) Oral every 8 hours  insulin lispro (HumaLOG) corrective regimen sliding scale   SubCutaneous every 6 hours  insulin lispro Injectable (HumaLOG) 3 Unit(s) SubCutaneous three times a day before meals  insulin NPH human recombinant 5 Unit(s) SubCutaneous every 12 hours  pantoprazole    Tablet 40 milliGRAM(s) Oral before breakfast  predniSONE   Tablet 10 milliGRAM(s) Oral <User Schedule>  sodium chloride 0.9% lock flush 10 milliLiter(s) IV Push every 1 hour PRN  sodium chloride 0.9% lock flush 3 milliLiter(s) IV Push every 8 hours  tacrolimus 4.5 milliGRAM(s) Oral <User Schedule>  vancomycin    Solution 125 milliGRAM(s) Oral every 6 hours      Vitals:  T(C): 36.9 (20 @ 21:15), Max: 36.9 (20 @ 21:15)  HR: 107 (20 @ 21:15) (107 - 120)  BP: 105/669 (20 @ 21:15) (99/69 - 108/76)  BP(mean): 81 (20 @ 21:15) (79 - 86)  RR: 18 (20 @ 21:15) (18 - 18)  SpO2: 94% (20 @ 21:15) (94% - 98%)    Daily     Daily Weight in k.1 (22 Sep 2020 08:00)        I&O's Summary    21 Sep 2020 07:01  -  22 Sep 2020 07:00  --------------------------------------------------------  IN: 1540 mL / OUT: 800 mL / NET: 740 mL    22 Sep 2020 07:01  -  22 Sep 2020 22:03  --------------------------------------------------------  IN: 820 mL / OUT: 500 mL / NET: 320 mL        Physical Exam:  Appearance: No Acute Distress  HEENT: PERRL  Neck: No JVD  Cardiovascular: Normal S1 S2, No murmurs/rubs/gallops  Respiratory: Clear to auscultation bilaterally  Gastrointestinal: Soft, Non-tender	  Skin: No cyanosis	  Neurologic: Non-focal  Extremities: No LE edema; RUE swelling with pitting  Psychiatry: A & O x 3, Mood & affect appropriate    Labs:                        9.6    7.48  )-----------( 210      ( 22 Sep 2020 09:22 )             33.9         137  |  98  |  35<H>  ----------------------------<  95  4.8   |  29  |  1.31<H>    Ca    8.8      22 Sep 2020 09:22  Phos  3.3       Mg     2.5

## 2020-09-22 NOTE — PROGRESS NOTE ADULT - SUBJECTIVE AND OBJECTIVE BOX
Subjective " hello I am feeling a little better today"    VITAL SIGNS    Telemetry:      Vital Signs Last 24 Hrs  T(C): 36.7 (20 @ 05:00), Max: 36.7 (20 @ 05:00)  T(F): 98.1 (20 @ 05:00), Max: 98.1 (20 @ 05:00)  HR: 119 (20 @ 05:00) (109 - 121)  BP: 108/76 (20 @ 05:00) (102/72 - 123/75)  RR: 18 (20 @ 05:00) (18 - 18)  SpO2: 96% (20 @ 05:00) (96% - 99%)            @ 07:01  -   @ 07:00  --------------------------------------------------------  IN: 1540 mL / OUT: 800 mL / NET: 740 mL  Daily Weight in k.1 (22 Sep 2020 08:00)  CAPILLARY BLOOD GLUCOSE  POCT Blood Glucose.: 93 mg/dL (22 Sep 2020 08:10)  POCT Blood Glucose.: 109 mg/dL (22 Sep 2020 05:44)  POCT Blood Glucose.: 101 mg/dL (21 Sep 2020 23:54)  POCT Blood Glucose.: 80 mg/dL (21 Sep 2020 21:33)  POCT Blood Glucose.: 83 mg/dL (21 Sep 2020 17:06)  POCT Blood Glucose.: 128 mg/dL (21 Sep 2020 11:35)  MEDICATIONS  (STANDING):  chlorhexidine 2% Cloths 1 Application(s) Topical <User Schedule>  dronabinol 5 milliGRAM(s) Oral <User Schedule>  furosemide   Injectable 40 milliGRAM(s) IV Push every 12 hours  heparin   Injectable 5000 Unit(s) SubCutaneous every 8 hours  hydrALAZINE 25 milliGRAM(s) Oral every 8 hours  insulin lispro (HumaLOG) corrective regimen sliding scale   SubCutaneous every 6 hours  insulin lispro Injectable (HumaLOG) 3 Unit(s) SubCutaneous three times a day before meals  insulin NPH human recombinant 5 Unit(s) SubCutaneous every 12 hours  pantoprazole    Tablet 40 milliGRAM(s) Oral before breakfast  predniSONE   Tablet 10 milliGRAM(s) Oral <User Schedule>  sodium chloride 0.9% lock flush 3 milliLiter(s) IV Push every 8 hours  tacrolimus 4.5 milliGRAM(s) Oral <User Schedule>  vancomycin    Solution 125 milliGRAM(s) Oral every 6 hours    MEDICATIONS  (PRN):  benzocaine 15 mG/menthol 3.6 mG (Sugar-Free) Lozenge 1 Lozenge Oral every 6 hours PRN Sore Throat  dextrose 40% Gel 15 Gram(s) Oral once PRN Blood Glucose LESS THAN 70 milliGRAM(s)/deciliter  glucagon  Injectable 1 milliGRAM(s) IntraMuscular once PRN Glucose LESS THAN 70 milligrams/deciliter  sodium chloride 0.9% lock flush 10 milliLiter(s) IV Push every 1 hour PRN Pre/post blood products, medications, blood draw, and to maintain line patency      PHYSICAL EXAM  Neurology: alert and oriented x 3, nonfocal, no gross deficits  CV : S1 S2 RRR    Sternal Wound :  NAZ sternum stable    Lungs:B/l CTA on roomair    Abdomen:  Randolph feed in place soft, nontender, nondistended, positive bowel sounds, last bowel movement     : voids              Extremities:  B/lle  warm well perfused + DP no edema  RUE + edema warm + radial pulse                                      Physical Therapy Rec:   Home  [  ]   Home w/ PT  [  ]  Rehab  [  x]    Discussed with Cardiothoracic Team at AM rounds.

## 2020-09-22 NOTE — PROGRESS NOTE ADULT - ATTENDING COMMENTS
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 Noted to have warm antobody hemolysis. Improved with prednisone. Continue to taper slowly if ok with transplant team. Trend hemolysis labs

## 2020-09-22 NOTE — PROGRESS NOTE ADULT - PROBLEM SELECTOR PLAN 4
Patient with ileus in setting of C.Diff- illeus resolved with bowel rest  TPN d/c   Gen  surgery Following no surgical intervention  CC/ low fiber diet  Nocturnal Glucerna tube feeds via Keofeed  Vancomycin PO decreased to 125mg  ID following

## 2020-09-22 NOTE — PROGRESS NOTE ADULT - SUBJECTIVE AND OBJECTIVE BOX
INTERVAL HPI/OVERNIGHT EVENTS:  No overnight events. Assessed at bedside. Patient significantly improved from prior assessment. Currently has no complains.     MEDICATIONS  (STANDING):  chlorhexidine 2% Cloths 1 Application(s) Topical <User Schedule>  dextrose 5%. 1000 milliLiter(s) (50 mL/Hr) IV Continuous <Continuous>  dextrose 50% Injectable 12.5 Gram(s) IV Push once  dextrose 50% Injectable 25 Gram(s) IV Push once  dextrose 50% Injectable 25 Gram(s) IV Push once  dronabinol 5 milliGRAM(s) Oral <User Schedule>  furosemide   Injectable 40 milliGRAM(s) IV Push every 12 hours  heparin   Injectable 5000 Unit(s) SubCutaneous every 8 hours  hydrALAZINE 25 milliGRAM(s) Oral every 8 hours  insulin lispro (HumaLOG) corrective regimen sliding scale   SubCutaneous every 6 hours  insulin lispro Injectable (HumaLOG) 3 Unit(s) SubCutaneous three times a day before meals  insulin NPH human recombinant 5 Unit(s) SubCutaneous every 12 hours  pantoprazole    Tablet 40 milliGRAM(s) Oral before breakfast  predniSONE   Tablet 10 milliGRAM(s) Oral <User Schedule>  sodium chloride 0.9% lock flush 3 milliLiter(s) IV Push every 8 hours  tacrolimus 4.5 milliGRAM(s) Oral <User Schedule>  vancomycin    Solution 125 milliGRAM(s) Oral every 6 hours    MEDICATIONS  (PRN):  benzocaine 15 mG/menthol 3.6 mG (Sugar-Free) Lozenge 1 Lozenge Oral every 6 hours PRN Sore Throat  dextrose 40% Gel 15 Gram(s) Oral once PRN Blood Glucose LESS THAN 70 milliGRAM(s)/deciliter  glucagon  Injectable 1 milliGRAM(s) IntraMuscular once PRN Glucose LESS THAN 70 milligrams/deciliter  sodium chloride 0.9% lock flush 10 milliLiter(s) IV Push every 1 hour PRN Pre/post blood products, medications, blood draw, and to maintain line patency      Allergies    No Known Allergies    Intolerances      Vital Signs Last 24 Hrs  T(C): 36.8 (22 Sep 2020 12:36), Max: 36.8 (22 Sep 2020 12:36)  T(F): 98.3 (22 Sep 2020 12:36), Max: 98.3 (22 Sep 2020 12:36)  HR: 118 (22 Sep 2020 12:36) (109 - 120)  BP: 99/69 (22 Sep 2020 12:36) (99/69 - 123/75)  BP(mean): 79 (22 Sep 2020 12:36) (79 - 91)  RR: 18 (22 Sep 2020 12:36) (18 - 18)  SpO2: 96% (22 Sep 2020 12:36) (96% - 98%)    PHYSICAL EXAM:  Constitutional: NAD  Eyes: EOMI, sclera non-icteric  Neck: supple  Respiratory: CTAB, no wheezes or crackles   Cardiovascular: RRR. + anasarca  Gastrointestinal: distended but soft; mild tenderness on palpation; NG tube with gastric fluid draining  Extremities: no cyanosis, clubbing or edema. +anasarca, improved from prior. Right arm continues to be swellon  Neurological: awake and alert    LABS:                                   9.6    7.48  )-----------( 210      ( 22 Sep 2020 09:22 )             33.9       09-22    137  |  98  |  35<H>  ----------------------------<  95  4.8   |  29  |  1.31<H>    Ca    8.8      22 Sep 2020 09:22  Phos  3.3     09-21  Mg     2.5     09-22            RADIOLOGY & ADDITIONAL TESTS:  Studies reviewed.

## 2020-09-22 NOTE — PROGRESS NOTE ADULT - ASSESSMENT
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 Possible that heart transplant and use of cyclosporine during this admission worsened it. Infection can also worsen hemolysis  -Repeat haptoglobin on 9/8 low; raising concern for worsening hemolysis with steroid taper to lower dose, now improved  -Please obtain: repeat LDH, haptoglobin, retic, indirect bili for tomorrow; want to ensure if this is continuing to improve  -Please decrease prednisone to 5mg daily if ok from heart transplant team; want to observe for lowest dose of prednisone needed to prevent hemolysis now that cyclosporine has stopped  -continue folic acid supplementation    #Klebsiella Bacteremia   -completed abx on 8/24     #Cdiff  #Ileus  -Vanc PO per ID   -abdomen distended, XR indicating Ileus now with NG tube  -plan per primary/surgery     #Heart transplant recipient  -Repeat heart biopsy per transplant team      Lyndsay Valentin, PGY-4  Hematology-Oncology Fellow  [phone] ([place]) [phone] ([facility])  Please page fellow on call after 5pm and on weekends

## 2020-09-22 NOTE — PROGRESS NOTE ADULT - PROBLEM SELECTOR PLAN 2
- Continue tacrolimus, goal will be ~10 as monotherapy (previously on everolimus).   - Continue prednisone 10 mg daily, wean per heme  - Will need to resume statin and aspirin when stable.

## 2020-09-22 NOTE — PROGRESS NOTE ADULT - ASSESSMENT
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 Patient was treated w/ prednisone and Rituximab. Tacro was discontinued and patient was also transitioned to everolimus  Admitted  6/16-6/19  for hyperglycemia.  Reported to transplant team having one done black tarry stool-  instructed to  present to [facility] for hemolytic anemia. Patient has been following with Hematology outpatient.  Denies CP  N/V diarrhea SOB. (11 Aug 2020 20:08)  /11 Admitted to [facility] with symptomatic anemia  8/13 EGD for investigation of tarry stools  8/15 SB capsule: stomach & SB lesions, likely ulcers.  8/17 EGD/push enteroscopy w/ angioectasias/erosions in small bowel. Gastropathy and esophagitis. Ulcer seen on VCE most likely scope trauma.    8/18 VSS transferred back to floor.   8/20 VS 9.0/28.4 stable Gastric biopsy results LA Grade A esophagitis.  - Acute gastritis. Biopsies taken to r/o CMV, No ulceration seen despite finding on capsule endoscopy - likely 2/2 scope  trauma that has since resolved. Pathology pending.  8/21 VSS H/H  8.1/25.7  trending down will monitor prednisone taper 30 mg today. Procardia 120 initiated today RHC biopsy Monday.   8/22 VVS; Patient reports loose stools x 2-3 days with 1 episode today.  Stool sent for C-dif and GI PCR. Abdominal X-ray revealed: dilated loops of bowel. Abdomen distended.  Patient denies N/V. GI called to re-evaluate. Continue with current medication regimen.  Awaiting for upcoming cardiac biopsy on Monday 8/24.  8/23 VVS; creat up to 2.28, repeating CMP, TTE ordered Bladder scan   8/24 cardiac bx cancelled. elev creat  to 2.74   cardio renal cslt called, + CDIF  increase vanco to 500 q6 ID following  8/25 VSS: RHC today as per transplant team; transfuse 2 units PRBC today as per Dr. Viramontes; continue vanco for c-diff; transfer patient to CTU after cath today   8/26. Dieretic challenge with good response   8/27: Downgraded to the floor then upgraded to the CTU again for concerning of abd distention   8/28 CT ABD & Pelvis reveals pancolitis  8/30: repeat CT scan of abd, gen surgery called to re-evaluated pt.  8/31: RUE duplex negative for DVT  9/4 NGT clamped, minimal residual. NGT removed. started sips   9/6 tolerating clear liquid diet  9/8 Bronch  9/9 1 prbc   9/10 increased tube feeds to 20cc/hr and tpn decreased to 63cc/hr from 125cc/hr   9/12 TPN discontinued  9/14 Diet advanced, tube feeds at 75% goal, central line removed and midline inserted.  9/14 pt completed approp 75 % of meal.   pt ambulated to the door this afternoon  9/15 Pt tolerating PO diet with supplemental tube feeds. calorie count iniated  9/16 VSS OOB in chair no acute distress transferred to [address] [place] [address] nocturnal tube feeds 80 cc/hrx12 - RUE + edema Tacro level 7.2  PT disposition  to rehab.  9/177 VSS flat affect dronabinol added appetite stimulant at goal with Glucerna nocturnal feeds.. Rosas removed this am  9/18 VSS Remains on Glucerna TF, failed calorie count, Glucerna increased to 3 daily. Loose BM Improving--continue on Vanco 125mg QID taper as per ID.   9/19 VVS; RUE edema --> Vascular surgery consult called per CHF for prior occlusion of right subclavian.  Right Midline D/C'd . No evidence of DVT. CT Venogram of RUE to evaluate patency of subclavian  9/20 HD stable, cont PO vanco. Tacro level 9.4.   9/21    OOB to chait   VSS  Loose BM  this am   on vanco  9/22 VSS small oral intake with encouragement  this am. Calorie count in place

## 2020-09-23 LAB
ANION GAP SERPL CALC-SCNC: 12 MMOL/L — SIGNIFICANT CHANGE UP (ref 5–17)
BUN SERPL-MCNC: 35 MG/DL — HIGH (ref 7–23)
CALCIUM SERPL-MCNC: 9 MG/DL — SIGNIFICANT CHANGE UP (ref 8.4–10.5)
CHLORIDE SERPL-SCNC: 97 MMOL/L — SIGNIFICANT CHANGE UP (ref 96–108)
CO2 SERPL-SCNC: 29 MMOL/L — SIGNIFICANT CHANGE UP (ref 22–31)
CREAT SERPL-MCNC: 1.3 MG/DL — SIGNIFICANT CHANGE UP (ref 0.5–1.3)
FUNGITELL: 119 PG/ML — HIGH
GLUCOSE BLDC GLUCOMTR-MCNC: 105 MG/DL — HIGH (ref 70–99)
GLUCOSE BLDC GLUCOMTR-MCNC: 109 MG/DL — HIGH (ref 70–99)
GLUCOSE BLDC GLUCOMTR-MCNC: 109 MG/DL — HIGH (ref 70–99)
GLUCOSE BLDC GLUCOMTR-MCNC: 110 MG/DL — HIGH (ref 70–99)
GLUCOSE BLDC GLUCOMTR-MCNC: 117 MG/DL — HIGH (ref 70–99)
GLUCOSE BLDC GLUCOMTR-MCNC: 119 MG/DL — HIGH (ref 70–99)
GLUCOSE BLDC GLUCOMTR-MCNC: 122 MG/DL — HIGH (ref 70–99)
GLUCOSE SERPL-MCNC: 85 MG/DL — SIGNIFICANT CHANGE UP (ref 70–99)
HCT VFR BLD CALC: 33.8 % — LOW (ref 39–50)
HGB BLD-MCNC: 10.1 G/DL — LOW (ref 13–17)
MAGNESIUM SERPL-MCNC: 2.5 MG/DL — SIGNIFICANT CHANGE UP (ref 1.6–2.6)
MCHC RBC-ENTMCNC: 29.9 GM/DL — LOW (ref 32–36)
MCHC RBC-ENTMCNC: 32.6 PG — SIGNIFICANT CHANGE UP (ref 27–34)
MCV RBC AUTO: 109 FL — HIGH (ref 80–100)
NRBC # BLD: 0 /100 WBCS — SIGNIFICANT CHANGE UP (ref 0–0)
PHOSPHATE SERPL-MCNC: 4.6 MG/DL — HIGH (ref 2.5–4.5)
PLATELET # BLD AUTO: 238 K/UL — SIGNIFICANT CHANGE UP (ref 150–400)
POTASSIUM SERPL-MCNC: 5.1 MMOL/L — SIGNIFICANT CHANGE UP (ref 3.5–5.3)
POTASSIUM SERPL-SCNC: 5.1 MMOL/L — SIGNIFICANT CHANGE UP (ref 3.5–5.3)
RBC # BLD: 3.1 M/UL — LOW (ref 4.2–5.8)
RBC # FLD: 25.2 % — HIGH (ref 10.3–14.5)
SODIUM SERPL-SCNC: 138 MMOL/L — SIGNIFICANT CHANGE UP (ref 135–145)
TACROLIMUS SERPL-MCNC: 8.4 NG/ML — SIGNIFICANT CHANGE UP
WBC # BLD: 7.12 K/UL — SIGNIFICANT CHANGE UP (ref 3.8–10.5)
WBC # FLD AUTO: 7.12 K/UL — SIGNIFICANT CHANGE UP (ref 3.8–10.5)

## 2020-09-23 PROCEDURE — 99233 SBSQ HOSP IP/OBS HIGH 50: CPT

## 2020-09-23 PROCEDURE — 99232 SBSQ HOSP IP/OBS MODERATE 35: CPT

## 2020-09-23 RX ORDER — TACROLIMUS 5 MG/1
5 CAPSULE ORAL
Refills: 0 | Status: DISCONTINUED | OUTPATIENT
Start: 2020-09-23 | End: 2020-09-25

## 2020-09-23 RX ORDER — DRONABINOL 2.5 MG
5 CAPSULE ORAL
Refills: 0 | Status: DISCONTINUED | OUTPATIENT
Start: 2020-09-24 | End: 2020-10-01

## 2020-09-23 RX ORDER — TACROLIMUS 5 MG/1
4.5 CAPSULE ORAL
Refills: 0 | Status: DISCONTINUED | OUTPATIENT
Start: 2020-09-24 | End: 2020-09-25

## 2020-09-23 RX ADMIN — SODIUM CHLORIDE 3 MILLILITER(S): 9 INJECTION INTRAMUSCULAR; INTRAVENOUS; SUBCUTANEOUS at 14:30

## 2020-09-23 RX ADMIN — Medication 25 MILLIGRAM(S): at 07:07

## 2020-09-23 RX ADMIN — HEPARIN SODIUM 5000 UNIT(S): 5000 INJECTION INTRAVENOUS; SUBCUTANEOUS at 07:07

## 2020-09-23 RX ADMIN — Medication 10 MILLIGRAM(S): at 09:18

## 2020-09-23 RX ADMIN — SODIUM CHLORIDE 3 MILLILITER(S): 9 INJECTION INTRAMUSCULAR; INTRAVENOUS; SUBCUTANEOUS at 07:06

## 2020-09-23 RX ADMIN — Medication 125 MILLIGRAM(S): at 00:20

## 2020-09-23 RX ADMIN — SODIUM CHLORIDE 3 MILLILITER(S): 9 INJECTION INTRAMUSCULAR; INTRAVENOUS; SUBCUTANEOUS at 21:00

## 2020-09-23 RX ADMIN — TACROLIMUS 4.5 MILLIGRAM(S): 5 CAPSULE ORAL at 09:18

## 2020-09-23 RX ADMIN — Medication 3 UNIT(S): at 17:31

## 2020-09-23 RX ADMIN — Medication 25 MILLIGRAM(S): at 13:03

## 2020-09-23 RX ADMIN — PANTOPRAZOLE SODIUM 40 MILLIGRAM(S): 20 TABLET, DELAYED RELEASE ORAL at 07:07

## 2020-09-23 RX ADMIN — Medication 3 UNIT(S): at 13:02

## 2020-09-23 RX ADMIN — Medication 5 MILLIGRAM(S): at 13:03

## 2020-09-23 RX ADMIN — Medication 125 MILLIGRAM(S): at 17:32

## 2020-09-23 RX ADMIN — HUMAN INSULIN 5 UNIT(S): 100 INJECTION, SUSPENSION SUBCUTANEOUS at 17:31

## 2020-09-23 RX ADMIN — HEPARIN SODIUM 5000 UNIT(S): 5000 INJECTION INTRAVENOUS; SUBCUTANEOUS at 13:03

## 2020-09-23 RX ADMIN — HEPARIN SODIUM 5000 UNIT(S): 5000 INJECTION INTRAVENOUS; SUBCUTANEOUS at 22:09

## 2020-09-23 RX ADMIN — Medication 125 MILLIGRAM(S): at 13:03

## 2020-09-23 RX ADMIN — Medication 25 MILLIGRAM(S): at 22:09

## 2020-09-23 RX ADMIN — Medication 40 MILLIGRAM(S): at 17:32

## 2020-09-23 RX ADMIN — HUMAN INSULIN 5 UNIT(S): 100 INJECTION, SUSPENSION SUBCUTANEOUS at 07:09

## 2020-09-23 RX ADMIN — CHLORHEXIDINE GLUCONATE 1 APPLICATION(S): 213 SOLUTION TOPICAL at 08:29

## 2020-09-23 RX ADMIN — Medication 3 UNIT(S): at 09:12

## 2020-09-23 RX ADMIN — Medication 125 MILLIGRAM(S): at 07:07

## 2020-09-23 RX ADMIN — TACROLIMUS 5 MILLIGRAM(S): 5 CAPSULE ORAL at 20:04

## 2020-09-23 RX ADMIN — Medication 5 MILLIGRAM(S): at 07:07

## 2020-09-23 RX ADMIN — Medication 40 MILLIGRAM(S): at 07:07

## 2020-09-23 NOTE — PROGRESS NOTE ADULT - SUBJECTIVE AND OBJECTIVE BOX
Subjective " hello I am feelingbetter I ate my breakfast"    VITAL SIGNS    Telemetry:  SR1 100-130    Vital Signs Last 24 Hrs  T(C): 36.7 (20 @ 06:20), Max: 36.9 (20 @ 21:15)  T(F): 98.1 (20 @ 06:20), Max: 98.4 (20 @ 21:15)  HR: 76 (20 @ 06:20) (76 - 120)  BP: 119/77 (20 @ 06:20) (99/69 - 119/77)  RR: 18 (20 @ 06:20) (18 - 18)  SpO2: 96% (20 @ 06:20) (94% - 98%)          @ 07:01  -   @ 07:00  --------------------------------------------------------  IN: 1040 mL / OUT: 700 mL / NET: 340 mL     @ 07:01  -   @ 10:15  --------------------------------------------------------  IN: 0 mL / OUT: 250 mL / NET: -250 mL      Daily Weight in k.3 (23 Sep 2020 09:44)  MEDICATIONS  (STANDING):  chlorhexidine 2% Cloths 1 Application(s) Topical <User Schedule>  dronabinol 5 milliGRAM(s) Oral <User Schedule>  furosemide   Injectable 40 milliGRAM(s) IV Push every 12 hours  heparin   Injectable 5000 Unit(s) SubCutaneous every 8 hours  hydrALAZINE 25 milliGRAM(s) Oral every 8 hours  insulin lispro (HumaLOG) corrective regimen sliding scale   SubCutaneous every 6 hours  insulin lispro Injectable (HumaLOG) 3 Unit(s) SubCutaneous three times a day before meals  insulin NPH human recombinant 5 Unit(s) SubCutaneous every 12 hours  pantoprazole    Tablet 40 milliGRAM(s) Oral before breakfast  predniSONE   Tablet 10 milliGRAM(s) Oral <User Schedule>  sodium chloride 0.9% lock flush 3 milliLiter(s) IV Push every 8 hours  tacrolimus 4.5 milliGRAM(s) Oral <User Schedule>  vancomycin    Solution 125 milliGRAM(s) Oral every 6 hours    MEDICATIONS  (PRN):  benzocaine 15 mG/menthol 3.6 mG (Sugar-Free) Lozenge 1 Lozenge Oral every 6 hours PRN Sore Throat  dextrose 40% Gel 15 Gram(s) Oral once PRN Blood Glucose LESS THAN 70 milliGRAM(s)/deciliter  glucagon  Injectable 1 milliGRAM(s) IntraMuscular once PRN Glucose LESS THAN 70 milligrams/deciliter  sodium chloride 0.9% lock flush 10 milliLiter(s) IV Push every 1 hour PRN Pre/post blood products, medications, blood draw, and to maintain line patency    CAPILLARY BLOOD GLUCOSE    POCT Blood Glucose.: 110 mg/dL (23 Sep 2020 08:14)  POCT Blood Glucose.: 117 mg/dL (23 Sep 2020 06:24)  POCT Blood Glucose.: 105 mg/dL (23 Sep 2020 00:00)  POCT Blood Glucose.: 103 mg/dL (22 Sep 2020 20:21)  POCT Blood Glucose.: 100 mg/dL (22 Sep 2020 19:09)  POCT Blood Glucose.: 111 mg/dL (22 Sep 2020 12:28)                        PHYSICAL EXAM  Neurology: alert and oriented x 3, nonfocal, no gross deficits  CV : s1 S2 RRR    Sternal Wound :  Healed  sternum Stable    Lungs: B/l CTA on room air     Abdomen: soft, nontender, nondistended, positive bowel sounds, last bowel movement     : voiding          Extremities:    RUE with edema + Pulses  LUE wdl   B/lle warm well perfused + DP no edema                                        Physical Therapy Rec:   Home  [  ]   Home w/ PT  [  ]  Rehab  [  x]    Discussed with Cardiothoracic Team at AM rounds.

## 2020-09-23 NOTE — PROGRESS NOTE ADULT - ASSESSMENT
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 Patient was treated w/ prednisone and Rituximab. Tacro was discontinued and patient was also transitioned to everolimus  Admitted  6/16-6/19  for hyperglycemia.  Reported to transplant team having one done black tarry stool-  instructed to  present to [facility] for hemolytic anemia. Patient has been following with Hematology outpatient.  Denies CP  N/V diarrhea SOB. (11 Aug 2020 20:08)  /11 Admitted to [facility] with symptomatic anemia  8/13 EGD for investigation of tarry stools  8/15 SB capsule: stomach & SB lesions, likely ulcers.  8/17 EGD/push enteroscopy w/ angioectasias/erosions in small bowel. Gastropathy and esophagitis. Ulcer seen on VCE most likely scope trauma.    8/18 VSS transferred back to floor.   8/20 VS 9.0/28.4 stable Gastric biopsy results LA Grade A esophagitis.  - Acute gastritis. Biopsies taken to r/o CMV, No ulceration seen despite finding on capsule endoscopy - likely 2/2 scope  trauma that has since resolved. Pathology pending.  8/21 VSS H/H  8.1/25.7  trending down will monitor prednisone taper 30 mg today. Procardia 120 initiated today RHC biopsy Monday.   8/22 VVS; Patient reports loose stools x 2-3 days with 1 episode today.  Stool sent for C-dif and GI PCR. Abdominal X-ray revealed: dilated loops of bowel. Abdomen distended.  Patient denies N/V. GI called to re-evaluate. Continue with current medication regimen.  Awaiting for upcoming cardiac biopsy on Monday 8/24.  8/23 VVS; creat up to 2.28, repeating CMP, TTE ordered Bladder scan   8/24 cardiac bx cancelled. elev creat  to 2.74   cardio renal cslt called, + CDIF  increase vanco to 500 q6 ID following  8/25 VSS: RHC today as per transplant team; transfuse 2 units PRBC today as per Dr. Viramontes; continue vanco for c-diff; transfer patient to CTU after cath today   8/26. Dieretic challenge with good response   8/27: Downgraded to the floor then upgraded to the CTU again for concerning of abd distention   8/28 CT ABD & Pelvis reveals pancolitis  8/30: repeat CT scan of abd, gen surgery called to re-evaluated pt.  8/31: RUE duplex negative for DVT  9/4 NGT clamped, minimal residual. NGT removed. started sips   9/6 tolerating clear liquid diet  9/8 Bronch  9/9 1 prbc   9/10 increased tube feeds to 20cc/hr and tpn decreased to 63cc/hr from 125cc/hr   9/12 TPN discontinued  9/14 Diet advanced, tube feeds at 75% goal, central line removed and midline inserted.  9/14 pt completed approp 75 % of meal.   pt ambulated to the door this afternoon  9/15 Pt tolerating PO diet with supplemental tube feeds. calorie count iniated  9/16 VSS OOB in chair no acute distress transferred to [facility] nocturnal tube feeds 80 cc/hrx12 - RUE + edema Tacro level 7.2  PT disposition  to rehab.  9/177 VSS flat affect dronabinol added appetite stimulant at goal with Glucerna nocturnal feeds.. Rosas removed this am  9/18 VSS Remains on Glucerna TF, failed calorie count, Glucerna increased to 3 daily. Loose BM Improving--continue on Vanco 125mg QID taper as per ID.   9/19 VVS; RUE edema --> Vascular surgery consult called per CHF for prior occlusion of right subclavian.  Right Midline D/C'd . No evidence of DVT. CT Venogram of RUE to evaluate patency of subclavian  9/20 HD stable, cont PO vanco. Tacro level 9.4.   9/21    OOB to chait   VSS  Loose BM  this am   on vanco  9/22 VSS small oral intake with encouragement  this am. Calorie count in place  9/23 VSS appetite increasing oral intake improving per ID Vancomycin to be decreased to BID  in am.

## 2020-09-23 NOTE — PROGRESS NOTE ADULT - ASSESSMENT
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 s/p EGD with esophagitis and gastritis. Developed Klebsiella oxytoca bacteremia requiring transfer to CTU. Clinically improving, transferred to [facility], finished 10-day of ceftriaxone, however, developed severe C.diff colitis on Vancomycin 500mg q6h PO and IV Flagyl. He had RHC on 8/25 and found to have high filling pressure so transferred to CTU again.    #C.diff colitis with NORMAN- C.diff PCR detected (8/23). Repeat CT on 8/30 without evidence of toxic megacolon.   - clinically  has hit a plateau and without significant improvement but also without deterioration  tolerating minimal amounts of oral food  continue po vanco but dose to 125mg QID  through 9/24 followed by Vanco 125mg tid for one week followed by Vanco 125mg bid x 1 week then Vancomycin 125mg QD for a week  continue Marinol to improve appetite  Asked stewardship program to investigate if Bezlotoxamab 10mg/kg as a single dose could be obtained to help reduce recurrence of infection in this immunocompromised patient- it is possible but carries a significant fluid burden and may not be feasible from a cardiac standpoint       #OI prophylaxis-  -Was previously receiving high dose steroids  -CMV viral load(8/17, 8/26): neg, needs repeat specimen  -Valcyte and Bactrim d/c'ed on 8/17 due to leukopenia  -Galactomann<0.5, Fungitell<31  -Steroids being tapered currently 10mg/day prednisone        Gary Lujan MD  [phone]  After 5pm/weekends [phone]

## 2020-09-23 NOTE — PROGRESS NOTE ADULT - ASSESSMENT
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 He was treated with Rituximab and high dose steroids. He is currently admitted with symptomatic anemia with Hgb initially of 6.6 requiring blood transfusions. Evaluation was not consistent with hemolysis. EGD/enteroscopy eventually revealed chronic gastritis and small bowel ectasias.  His course was complicated by development of severe leukopenia and acute respiratory distress and profound sinus tachycardia on 8/13 in setting of Klebsiella bacteremia of unclear origin (preceded EGD). A CT scan of the chest showed a possible infiltrate. While his bacteremia was treated, he subsequently developed C diff colitis with severe abdominal distention. He also developed worsening acute on chronic renal failure likely from volume overload which has resolved with diuretics. He has ongoing improvement in abdominal distention and leukocytosis. He was briefly on TPN but now tolerating enteral feeds. He is making slow progress but remains deconditioned but has improved appetite. He also has RUE pain/swelling likely related to known R subclavian occlusion with TPN administered through R midline which has since been discontinued.

## 2020-09-23 NOTE — PROGRESS NOTE ADULT - SUBJECTIVE AND OBJECTIVE BOX
Interval History:  feels well  denies complaints  appetite improving     Medications:  chlorhexidine 2% Cloths 1 Application(s) Topical <User Schedule>  dextrose 40% Gel 15 Gram(s) Oral once PRN  dextrose 5%. 1000 milliLiter(s) IV Continuous <Continuous>  dextrose 50% Injectable 12.5 Gram(s) IV Push once  dextrose 50% Injectable 25 Gram(s) IV Push once  dextrose 50% Injectable 25 Gram(s) IV Push once  furosemide   Injectable 40 milliGRAM(s) IV Push every 12 hours  glucagon  Injectable 1 milliGRAM(s) IntraMuscular once PRN  heparin   Injectable 5000 Unit(s) SubCutaneous every 8 hours  hydrALAZINE 25 milliGRAM(s) Oral every 8 hours  insulin lispro (HumaLOG) corrective regimen sliding scale   SubCutaneous every 6 hours  insulin lispro Injectable (HumaLOG) 3 Unit(s) SubCutaneous three times a day before meals  insulin NPH human recombinant 5 Unit(s) SubCutaneous every 12 hours  pantoprazole    Tablet 40 milliGRAM(s) Oral before breakfast  predniSONE   Tablet 10 milliGRAM(s) Oral <User Schedule>  sodium chloride 0.9% lock flush 10 milliLiter(s) IV Push every 1 hour PRN  sodium chloride 0.9% lock flush 3 milliLiter(s) IV Push every 8 hours  tacrolimus 5 milliGRAM(s) Oral <User Schedule>  vancomycin    Solution 125 milliGRAM(s) Oral every 6 hours      Vitals:  T(C): 36.7 (20 @ 19:11), Max: 36.9 (20 @ 21:15)  HR: 108 (20 @ 19:30) (76 - 119)  BP: 103/69 (20 @ 19:11) (87/61 - 119/77)  BP(mean): 79 (20 @ 13:09) (79 - 81)  RR: 18 (20 @ 19:11) (18 - 18)  SpO2: 97% (20 @ 19:11) (94% - 97%)    Daily     Daily Weight in k.3 (23 Sep 2020 09:44)        I&O's Summary    22 Sep 2020 07:01  -  23 Sep 2020 07:00  --------------------------------------------------------  IN: 1040 mL / OUT: 700 mL / NET: 340 mL    23 Sep 2020 07:01  -  23 Sep 2020 20:26  --------------------------------------------------------  IN: 980 mL / OUT: 850 mL / NET: 130 mL        Physical Exam:  Appearance: No Acute Distress  HEENT: PERRL  Neck: No JVD  Cardiovascular: Normal S1 S2, No murmurs/rubs/gallops  Respiratory: Clear to auscultation bilaterally  Gastrointestinal: Soft, Non-tender,distended	  Skin: No cyanosis	  Neurologic: Non-focal  Extremities: No LE edema; RUE edema  Psychiatry: A & O x 3, Mood & affect appropriate    Labs:                        10.1   7.12  )-----------( 238      ( 23 Sep 2020 09:14 )             33.8         138  |  97  |  35<H>  ----------------------------<  85  5.1   |  29  |  1.30    Ca    9.0      23 Sep 2020 09:14  Phos  4.6       Mg     2.5

## 2020-09-23 NOTE — PROGRESS NOTE ADULT - PROBLEM SELECTOR PLAN 3
Calorie count   cc/ lowfiber diet  Glucerna 80 cc/ hrx12 nocturnal feeds  9/17 dronabinol added for appetite  9/23 oral intake improving

## 2020-09-23 NOTE — PROGRESS NOTE ADULT - SUBJECTIVE AND OBJECTIVE BOX
INFECTIOUS DISEASES FOLLOW UP-- Sujey Lujan  [phone]    This is a follow up note for this  70yMale with  Anemia  feeling better- tolerating oral food        ROS:  CONSTITUTIONAL:  No fever, good appetite  CARDIOVASCULAR:  No chest pain or palpitations  RESPIRATORY:  No dyspnea  GASTROINTESTINAL:  No nausea, vomiting, diarrhea, or abdominal pain  GENITOURINARY:  No dysuria  NEUROLOGIC:  No headache,     Allergies    No Known Allergies    Intolerances        ANTIBIOTICS/RELEVANT:  antimicrobials  vancomycin    Solution 125 milliGRAM(s) Oral every 6 hours    immunologic:  tacrolimus 5 milliGRAM(s) Oral <User Schedule>    OTHER:  chlorhexidine 2% Cloths 1 Application(s) Topical <User Schedule>  dextrose 40% Gel 15 Gram(s) Oral once PRN  dextrose 5%. 1000 milliLiter(s) IV Continuous <Continuous>  dextrose 50% Injectable 25 Gram(s) IV Push once  dextrose 50% Injectable 12.5 Gram(s) IV Push once  dextrose 50% Injectable 25 Gram(s) IV Push once  furosemide   Injectable 40 milliGRAM(s) IV Push every 12 hours  glucagon  Injectable 1 milliGRAM(s) IntraMuscular once PRN  heparin   Injectable 5000 Unit(s) SubCutaneous every 8 hours  hydrALAZINE 25 milliGRAM(s) Oral every 8 hours  insulin lispro (HumaLOG) corrective regimen sliding scale   SubCutaneous every 6 hours  insulin lispro Injectable (HumaLOG) 3 Unit(s) SubCutaneous three times a day before meals  insulin NPH human recombinant 5 Unit(s) SubCutaneous every 12 hours  pantoprazole    Tablet 40 milliGRAM(s) Oral before breakfast  predniSONE   Tablet 10 milliGRAM(s) Oral <User Schedule>  sodium chloride 0.9% lock flush 3 milliLiter(s) IV Push every 8 hours  sodium chloride 0.9% lock flush 10 milliLiter(s) IV Push every 1 hour PRN      Objective:  Vital Signs Last 24 Hrs  T(C): 36.7 (23 Sep 2020 13:09), Max: 36.9 (22 Sep 2020 21:15)  T(F): 98 (23 Sep 2020 13:09), Max: 98.4 (22 Sep 2020 21:15)  HR: 106 (23 Sep 2020 13:09) (76 - 119)  BP: 101/69 (23 Sep 2020 13:09) (87/61 - 119/77)  BP(mean): 79 (23 Sep 2020 13:09) (79 - 81)  RR: 18 (23 Sep 2020 13:09) (18 - 18)  SpO2: 96% (23 Sep 2020 13:09) (94% - 96%)    PHYSICAL EXAM:  Constitutional:no acute distress  Eyes:WALT, EOMI  Ear/Nose/Throat: no oral lesions, 	  Respiratory: clear BL  Cardiovascular: S1S2  Gastrointestinal:soft, (+) BS, no tenderness generally soft  Extremities:no e/e/c  No Lymphadenopathy  IV sites not inflammed.    LABS:                        10.1   7.12  )-----------( 238      ( 23 Sep 2020 09:14 )             33.8     09-23    138  |  97  |  35<H>  ----------------------------<  85  5.1   |  29  |  1.30    Ca    9.0      23 Sep 2020 09:14  Phos  4.6     09-23  Mg     2.5     09-23            MICROBIOLOGY:            RECENT CULTURES:      RADIOLOGY & ADDITIONAL STUDIES:

## 2020-09-23 NOTE — PROGRESS NOTE ADULT - PROBLEM SELECTOR PLAN 2
- Continue tacrolimus, goal will be ~10 as monotherapy (previously on everolimus); increase to 4.5/5 today  - Continue prednisone 10 mg daily, wean per heme  - Will need to resume statin and aspirin when stable.

## 2020-09-23 NOTE — PROGRESS NOTE ADULT - PROBLEM SELECTOR PLAN 7
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  - keep R arm elevated

## 2020-09-24 LAB
GLUCOSE BLDC GLUCOMTR-MCNC: 103 MG/DL — HIGH (ref 70–99)
GLUCOSE BLDC GLUCOMTR-MCNC: 108 MG/DL — HIGH (ref 70–99)
GLUCOSE BLDC GLUCOMTR-MCNC: 126 MG/DL — HIGH (ref 70–99)
GLUCOSE BLDC GLUCOMTR-MCNC: 99 MG/DL — SIGNIFICANT CHANGE UP (ref 70–99)
GLUCOSE BLDC GLUCOMTR-MCNC: 99 MG/DL — SIGNIFICANT CHANGE UP (ref 70–99)
SARS-COV-2 RNA SPEC QL NAA+PROBE: SIGNIFICANT CHANGE UP

## 2020-09-24 PROCEDURE — 99232 SBSQ HOSP IP/OBS MODERATE 35: CPT

## 2020-09-24 PROCEDURE — 99231 SBSQ HOSP IP/OBS SF/LOW 25: CPT

## 2020-09-24 RX ORDER — VANCOMYCIN HCL 1 G
125 VIAL (EA) INTRAVENOUS EVERY 12 HOURS
Refills: 0 | Status: DISCONTINUED | OUTPATIENT
Start: 2020-09-24 | End: 2020-10-02

## 2020-09-24 RX ADMIN — Medication 25 MILLIGRAM(S): at 06:22

## 2020-09-24 RX ADMIN — HEPARIN SODIUM 5000 UNIT(S): 5000 INJECTION INTRAVENOUS; SUBCUTANEOUS at 06:22

## 2020-09-24 RX ADMIN — Medication 40 MILLIGRAM(S): at 17:40

## 2020-09-24 RX ADMIN — Medication 125 MILLIGRAM(S): at 17:40

## 2020-09-24 RX ADMIN — SODIUM CHLORIDE 3 MILLILITER(S): 9 INJECTION INTRAMUSCULAR; INTRAVENOUS; SUBCUTANEOUS at 13:29

## 2020-09-24 RX ADMIN — TACROLIMUS 5 MILLIGRAM(S): 5 CAPSULE ORAL at 20:01

## 2020-09-24 RX ADMIN — SODIUM CHLORIDE 3 MILLILITER(S): 9 INJECTION INTRAMUSCULAR; INTRAVENOUS; SUBCUTANEOUS at 06:33

## 2020-09-24 RX ADMIN — SODIUM CHLORIDE 3 MILLILITER(S): 9 INJECTION INTRAMUSCULAR; INTRAVENOUS; SUBCUTANEOUS at 22:26

## 2020-09-24 RX ADMIN — Medication 10 MILLIGRAM(S): at 07:56

## 2020-09-24 RX ADMIN — Medication 3 UNIT(S): at 17:39

## 2020-09-24 RX ADMIN — Medication 3 UNIT(S): at 07:56

## 2020-09-24 RX ADMIN — Medication 5 MILLIGRAM(S): at 13:12

## 2020-09-24 RX ADMIN — TACROLIMUS 4.5 MILLIGRAM(S): 5 CAPSULE ORAL at 07:56

## 2020-09-24 RX ADMIN — Medication 25 MILLIGRAM(S): at 22:36

## 2020-09-24 RX ADMIN — HUMAN INSULIN 5 UNIT(S): 100 INJECTION, SUSPENSION SUBCUTANEOUS at 17:39

## 2020-09-24 RX ADMIN — HEPARIN SODIUM 5000 UNIT(S): 5000 INJECTION INTRAVENOUS; SUBCUTANEOUS at 13:13

## 2020-09-24 RX ADMIN — Medication 125 MILLIGRAM(S): at 06:44

## 2020-09-24 RX ADMIN — HUMAN INSULIN 5 UNIT(S): 100 INJECTION, SUSPENSION SUBCUTANEOUS at 06:44

## 2020-09-24 RX ADMIN — Medication 25 MILLIGRAM(S): at 13:12

## 2020-09-24 RX ADMIN — Medication 5 MILLIGRAM(S): at 06:22

## 2020-09-24 RX ADMIN — CHLORHEXIDINE GLUCONATE 1 APPLICATION(S): 213 SOLUTION TOPICAL at 13:29

## 2020-09-24 RX ADMIN — Medication 125 MILLIGRAM(S): at 00:00

## 2020-09-24 RX ADMIN — Medication 3 UNIT(S): at 12:31

## 2020-09-24 RX ADMIN — Medication 40 MILLIGRAM(S): at 06:22

## 2020-09-24 RX ADMIN — HEPARIN SODIUM 5000 UNIT(S): 5000 INJECTION INTRAVENOUS; SUBCUTANEOUS at 22:36

## 2020-09-24 RX ADMIN — PANTOPRAZOLE SODIUM 40 MILLIGRAM(S): 20 TABLET, DELAYED RELEASE ORAL at 06:22

## 2020-09-24 NOTE — PROGRESS NOTE ADULT - SUBJECTIVE AND OBJECTIVE BOX
INFECTIOUS DISEASES FOLLOW UP-- Sujey Lujan  [phone]    This is a follow up note for this  70yMale with  Anemia  C.diff colitis- slowly resolving  slow but steady increase in oral intake        ROS:  CONSTITUTIONAL:  No fever, fair appetite  CARDIOVASCULAR:  No chest pain or palpitations  RESPIRATORY:  No dyspnea  GASTROINTESTINAL:  No nausea, vomiting, diarrhea, or abdominal pain  GENITOURINARY:  No dysuria  NEUROLOGIC:  No headache,     Allergies    No Known Allergies    Intolerances        ANTIBIOTICS/RELEVANT:  antimicrobials  vancomycin    Solution 125 milliGRAM(s) Oral every 12 hours    immunologic:  tacrolimus 5 milliGRAM(s) Oral <User Schedule>  tacrolimus 4.5 milliGRAM(s) Oral <User Schedule>    OTHER:  chlorhexidine 2% Cloths 1 Application(s) Topical <User Schedule>  dextrose 40% Gel 15 Gram(s) Oral once PRN  dextrose 5%. 1000 milliLiter(s) IV Continuous <Continuous>  dextrose 50% Injectable 12.5 Gram(s) IV Push once  dextrose 50% Injectable 25 Gram(s) IV Push once  dextrose 50% Injectable 25 Gram(s) IV Push once  dronabinol 5 milliGRAM(s) Oral <User Schedule>  furosemide   Injectable 40 milliGRAM(s) IV Push every 12 hours  glucagon  Injectable 1 milliGRAM(s) IntraMuscular once PRN  heparin   Injectable 5000 Unit(s) SubCutaneous every 8 hours  hydrALAZINE 25 milliGRAM(s) Oral every 8 hours  insulin lispro (HumaLOG) corrective regimen sliding scale   SubCutaneous every 6 hours  insulin lispro Injectable (HumaLOG) 3 Unit(s) SubCutaneous three times a day before meals  insulin NPH human recombinant 5 Unit(s) SubCutaneous every 12 hours  pantoprazole    Tablet 40 milliGRAM(s) Oral before breakfast  predniSONE   Tablet 10 milliGRAM(s) Oral <User Schedule>  sodium chloride 0.9% lock flush 10 milliLiter(s) IV Push every 1 hour PRN  sodium chloride 0.9% lock flush 3 milliLiter(s) IV Push every 8 hours      Objective:  Vital Signs Last 24 Hrs  T(C): 36.7 (24 Sep 2020 05:24), Max: 36.7 (23 Sep 2020 19:11)  T(F): 98.1 (24 Sep 2020 05:24), Max: 98.1 (24 Sep 2020 05:24)  HR: 118 (24 Sep 2020 13:18) (106 - 120)  BP: 101/67 (24 Sep 2020 13:18) (100/68 - 114/79)  BP(mean): --  RR: 18 (24 Sep 2020 12:15) (18 - 18)  SpO2: 96% (24 Sep 2020 12:15) (96% - 97%)    PHYSICAL EXAM:  Constitutional:no acute distress  Eyes:WALT, EOMI  Ear/Nose/Throat: no oral lesions, 	  Respiratory: clear BL  Cardiovascular: S1S2  Gastrointestinal:soft, (+) BS, no tenderness  Extremities:no e/e/c  No Lymphadenopathy  IV sites not inflammed.    LABS:                        10.1   7.12  )-----------( 238      ( 23 Sep 2020 09:14 )             33.8     09-23    138  |  97  |  35<H>  ----------------------------<  85  5.1   |  29  |  1.30    Ca    9.0      23 Sep 2020 09:14  Phos  4.6     09-23  Mg     2.5     09-23            MICROBIOLOGY:            RECENT CULTURES:      RADIOLOGY & ADDITIONAL STUDIES:    no new results

## 2020-09-24 NOTE — PROGRESS NOTE ADULT - ASSESSMENT
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 s/p EGD with esophagitis and gastritis. Developed Klebsiella oxytoca bacteremia requiring transfer to CTU. Clinically improving, transferred to [facility], finished 10-day of ceftriaxone, however, developed severe C.diff colitis on Vancomycin 500mg q6h PO and IV Flagyl. He had RHC on 8/25 and found to have high filling pressure so transferred to CTU again.    #C.diff colitis with NORMAN- C.diff PCR detected (8/23). Repeat CT on 8/30 without evidence of toxic megacolon.   - clinically  has hit a plateau and without significant improvement but also without deterioration  tolerating minimal amounts of oral food  continue po vanco but dose to 125mg QID  through 9/24 followed by Vanco 125mg tid for one week followed by Vanco 125mg bid x 1 week then Vancomycin 125mg QD for a week  continue Marinol to improve appetite  Asked stewardship program to investigate if Bezlotoxamab 10mg/kg as a single dose could be obtained to help reduce recurrence of infection in this immunocompromised patient- it is possible but carries a significant fluid burden and may not be feasible from a cardiac standpoint       #OI prophylaxis-  -Was previously receiving high dose steroids  -CMV viral load(8/17, 8/26): neg, needs repeat specimen  -Valcyte and Bactrim d/c'ed on 8/17 due to leukopenia  -Galactomann<0.5, Fungitell<31  -Steroids being tapered currently 10mg/day prednisone        Gary Lujan MD  [phone]  After 5pm/weekends [phone]

## 2020-09-24 NOTE — PROGRESS NOTE ADULT - PROBLEM SELECTOR PLAN 3
Calorie count   cc/ lowfiber diet  Glucerna 80 cc/ hrx12 nocturnal feeds  9/17 dronabinol added for appetite Calorie count   cc/ low fiber diet  Glucerna 80 cc/ hrx12 nocturnal feeds  9/17 dronabinol added for appetite

## 2020-09-24 NOTE — PROGRESS NOTE ADULT - PROBLEM SELECTOR PLAN 5
Vascular surgery consult called per CHF for prior occlusion of right subclavian.    No evidence of DVT on Venous duplex study

## 2020-09-24 NOTE — PROGRESS NOTE ADULT - SUBJECTIVE AND OBJECTIVE BOX
S;  feeling better.  Eating  more each day    Telemetry:  -130    Vital Signs Last 24 Hrs  T(C): 36.7 (20 @ 05:24), Max: 36.7 (20 @ 13:09)  T(F): 98.1 (20 @ 05:24), Max: 98.1 (20 @ 05:24)  HR: 118 (20 @ 05:24) (106 - 119)  BP: 114/79 (20 @ 05:24) (87/61 - 114/79)  RR: 18 (20 @ 05:24) (18 - 18)  SpO2: 97% (20 @ 05:24) (95% - 97%)                    @ 07:01  -   @ 07:00  --------------------------------------------------------  IN: 1820 mL / OUT: 1350 mL / NET: 470 mL     @ 07:01  -   @ 10:40  --------------------------------------------------------  IN: 0 mL / OUT: 250 mL / NET: -250 mL          Daily     Daily Weight in k.8 (24 Sep 2020 09:48)        CAPILLARY BLOOD GLUCOSE      POCT Blood Glucose.: 108 mg/dL (24 Sep 2020 07:41)  POCT Blood Glucose.: 99 mg/dL (24 Sep 2020 06:17)  POCT Blood Glucose.: 109 mg/dL (23 Sep 2020 23:55)  POCT Blood Glucose.: 109 mg/dL (23 Sep 2020 21:18)  POCT Blood Glucose.: 122 mg/dL (23 Sep 2020 16:47)  POCT Blood Glucose.: 119 mg/dL (23 Sep 2020 12:03)          Drains:     MS         [  ] Drainage:                 L Pleural  [  ]  Drainage:                R Pleural  [  ]  Drainage:    Pacing Wires        [  ]   Settings:                                  Isolated  [  ]    Coumadin    [ ] YES          [ x ]      NO         Reason:                                 10.1   7.12  )-----------( 238      ( 23 Sep 2020 09:14 )             33.8       09-    138  |  97  |  35<H>  ----------------------------<  85  5.1   |  29  |  1.30    Ca    9.0      23 Sep 2020 09:14  Phos  4.6     -  Mg     2.5                 PHYSICAL EXAM:    Neurology: alert and oriented x 3, nonfocal, no gross deficits    CV :  S1S2 heard RRR    Lungs:  clear to ausc.  no w/r/r    Abdomen: soft, nontender, nondistended, positive bowel sounds,           Extremities:  no edema             chlorhexidine 2% Cloths 1 Application(s) Topical <User Schedule>  dextrose 40% Gel 15 Gram(s) Oral once PRN  dextrose 5%. 1000 milliLiter(s) IV Continuous <Continuous>  dextrose 50% Injectable 12.5 Gram(s) IV Push once  dextrose 50% Injectable 25 Gram(s) IV Push once  dextrose 50% Injectable 25 Gram(s) IV Push once  dronabinol 5 milliGRAM(s) Oral <User Schedule>  furosemide   Injectable 40 milliGRAM(s) IV Push every 12 hours  glucagon  Injectable 1 milliGRAM(s) IntraMuscular once PRN  heparin   Injectable 5000 Unit(s) SubCutaneous every 8 hours  hydrALAZINE 25 milliGRAM(s) Oral every 8 hours  insulin lispro (HumaLOG) corrective regimen sliding scale   SubCutaneous every 6 hours  insulin lispro Injectable (HumaLOG) 3 Unit(s) SubCutaneous three times a day before meals  insulin NPH human recombinant 5 Unit(s) SubCutaneous every 12 hours  pantoprazole    Tablet 40 milliGRAM(s) Oral before breakfast  predniSONE   Tablet 10 milliGRAM(s) Oral <User Schedule>  sodium chloride 0.9% lock flush 10 milliLiter(s) IV Push every 1 hour PRN  sodium chloride 0.9% lock flush 3 milliLiter(s) IV Push every 8 hours  tacrolimus 5 milliGRAM(s) Oral <User Schedule>  tacrolimus 4.5 milliGRAM(s) Oral <User Schedule>  vancomycin    Solution 125 milliGRAM(s) Oral every 6 hours                              Physical Therapy Rec:   Home  [  ]   Home w/ PT  [  ]  Rehab  [ x ]    Discussed with Cardiothoracic Team at AM rounds.

## 2020-09-24 NOTE — CHART NOTE - NSCHARTNOTEFT_GEN_A_CORE
Nutrition Follow Up Note    Patient seen for: Nutrition consult received for calorie count     Source:  RN, Medical record CTU team     Chart reviewed, events noted. 70 year old male with PMHx of NICM, s/p HM2 LVAD and OHT in 2018, with known coronary fistula. Recent admission for hemolytic anemia, now admitted for hyperglycemia and GIB. EGD and capsule study negative. Pt now C-Diff colitis w/o toxic megacolon. Required TPN for acute severe malnutrition in the setting of Colitis and ileus.  TPN has now been discontinued since .   Pt receiving Low fiber diet + nocturnal EN via NGT.     Diet : consistent CHO, Low fiber, Glucerna x3 daily + EN via NGT  Glucerna 1.5 @ 80ml/frm84ogn provided nocturnally.  EN provides: 1440kcals and 79.2gm protein (19kcals/kg and 1.0gm pro/kg based on dosing Wt: 75.4kg)  EN provision: 100% 3 days     Patient reports: Pt seen, out of bed ot chair, reports feeling well.   Pt states his appetite has been improving. Pt has been drinking Glucerna x2-3 daily as well as eating more at each meal.     Pt has a 3 day calorie count in progress. -. Thus far, Pt has an estimated consumption of  Day 1: 1148kclas and 44gm protein   Day 2 (breakfast/lunch only) 901kclas and 38gm protein.     Pt states he disliked his lunch today. Pt has a menu and is aware of how to contact diet office for meal replacement. RD assisted in menu selection assistance for dinner  today. RD reinforced importance of PO intake, encouraged protein rich foods.   Pt verbalized understanding    GI: 1 BM thus far today       Daily Weight in k.8 (-24), Weight in k.3 (-), Weight in k.1 (-), Weight in k.9 (-), Weight in k.8 (-19), Weight in k.5 (-18)    Drug Dosing Weight  Weight (kg): 75.2 (17 Aug 2020 14:29)  BMI (kg/m2): 26 (17 Aug 2020 14:29)      Pertinent Medications: MEDICATIONS  (STANDING):  chlorhexidine 2% Cloths 1 Application(s) Topical <User Schedule>  dextrose 5%. 1000 milliLiter(s) (50 mL/Hr) IV Continuous <Continuous>  dextrose 50% Injectable 12.5 Gram(s) IV Push once  dextrose 50% Injectable 25 Gram(s) IV Push once  dextrose 50% Injectable 25 Gram(s) IV Push once  dronabinol 5 milliGRAM(s) Oral <User Schedule>  furosemide   Injectable 40 milliGRAM(s) IV Push every 12 hours  heparin   Injectable 5000 Unit(s) SubCutaneous every 8 hours  hydrALAZINE 25 milliGRAM(s) Oral every 8 hours  insulin lispro (HumaLOG) corrective regimen sliding scale   SubCutaneous every 6 hours  insulin lispro Injectable (HumaLOG) 3 Unit(s) SubCutaneous three times a day before meals  insulin NPH human recombinant 5 Unit(s) SubCutaneous every 12 hours  pantoprazole    Tablet 40 milliGRAM(s) Oral before breakfast  predniSONE   Tablet 10 milliGRAM(s) Oral <User Schedule>  sodium chloride 0.9% lock flush 3 milliLiter(s) IV Push every 8 hours  tacrolimus 5 milliGRAM(s) Oral <User Schedule>  tacrolimus 4.5 milliGRAM(s) Oral <User Schedule>  vancomycin    Solution 125 milliGRAM(s) Oral every 12 hours    MEDICATIONS  (PRN):  dextrose 40% Gel 15 Gram(s) Oral once PRN Blood Glucose LESS THAN 70 milliGRAM(s)/deciliter  glucagon  Injectable 1 milliGRAM(s) IntraMuscular once PRN Glucose LESS THAN 70 milligrams/deciliter  sodium chloride 0.9% lock flush 10 milliLiter(s) IV Push every 1 hour PRN Pre/post blood products, medications, blood draw, and to maintain line patency      LABS:       Finger Sticks:  POCT Blood Glucose.: 103 mg/dL ( @ 11:57)  POCT Blood Glucose.: 108 mg/dL ( @ 07:41)  POCT Blood Glucose.: 99 mg/dL ( @ 06:17)  POCT Blood Glucose.: 109 mg/dL ( @ 23:55)  POCT Blood Glucose.: 109 mg/dL ( @ 21:18)  POCT Blood Glucose.: 122 mg/dL ( @ 16:47)      Skin per nursing documentation: intact  Edema: +2 tara ankle, foot and leg     Estimated Needs: based on dosing Wt: 75.2 Kg,   Energy: (25-30kcal/kg): 1880-2256kcal   Protein:  (1.4-1.6g protein/kg): 105-135g protein    Previous Nutrition Diagnosis: altered nutrition lab values  Nutrition Diagnosis is: defer at this time     Previous Nutrition Diagnosis: inadequate oral intake  Nutrition Diagnosis is: on going at this time.     Previous Nutrition Diagnosis: acute severe protein calorie malnutrition  Nutrition Diagnosis: remains appropriate, ongoing diet advancement     New Nutrition Diagnosis: None     Recommended Interventions:   1. Continue consistent CHO, Low Fiber,  2. Continue Glucerna to 3 x daily to further supplement PO Intake   3. Continue  Glucerna 1.5 @80ml/[id number] Provided nocturnally. Provides: 1440kcals and 79gm protein.  (19kcals/kg and 1.0gm pro/kg based on dosing Wt: 75.4kg). This represents 75% of estimated nutrient needs,   4. Continue Marinol as able to stimulant appetite.   5. 3 day calorie count in progress, -. RD will review upon completion   6. Trend GI tolerance   7. Trend BG levels, renal indices, LFT's, electrolytes and triglycerides     Monitoring and Evaluation:   Continue to monitor nutritional intake, tolerance to diet prescription, weights, labs, skin integrity  RD remains available upon request and will follow up per protocol  Gabbi Flowers RD, CDN, [facility] Pager #[phone].

## 2020-09-24 NOTE — PROGRESS NOTE ADULT - ASSESSMENT
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 Patient was treated w/ prednisone and Rituximab. Tacro was discontinued and patient was also transitioned to everolimus  Admitted  6/16-6/19  for hyperglycemia.  Reported to transplant team having one done black tarry stool-  instructed to  present to [facility] for hemolytic anemia. Patient has been following with Hematology outpatient.  Denies CP  N/V diarrhea SOB. (11 Aug 2020 20:08)  /11 Admitted to [facility] with symptomatic anemia  8/13 EGD for investigation of tarry stools  8/15 SB capsule: stomach & SB lesions, likely ulcers.  8/17 EGD/push enteroscopy w/ angioectasias/erosions in small bowel. Gastropathy and esophagitis. Ulcer seen on VCE most likely scope trauma.    8/18 VSS transferred back to floor.   8/20 VS 9.0/28.4 stable Gastric biopsy results LA Grade A esophagitis.  - Acute gastritis. Biopsies taken to r/o CMV, No ulceration seen despite finding on capsule endoscopy - likely 2/2 scope  trauma that has since resolved. Pathology pending.  8/21 VSS H/H  8.1/25.7  trending down will monitor prednisone taper 30 mg today. Procardia 120 initiated today RHC biopsy Monday.   8/22 VVS; Patient reports loose stools x 2-3 days with 1 episode today.  Stool sent for C-dif and GI PCR. Abdominal X-ray revealed: dilated loops of bowel. Abdomen distended.  Patient denies N/V. GI called to re-evaluate. Continue with current medication regimen.  Awaiting for upcoming cardiac biopsy on Monday 8/24.  8/23 VVS; creat up to 2.28, repeating CMP, TTE ordered Bladder scan   8/24 cardiac bx cancelled. elev creat  to 2.74   cardio renal cslt called, + CDIF  increase vanco to 500 q6 ID following  8/25 VSS: RHC today as per transplant team; transfuse 2 units PRBC today as per Dr. Viramontes; continue vanco for c-diff; transfer patient to CTU after cath today   8/26. Dieretic challenge with good response   8/27: Downgraded to the floor then upgraded to the CTU again for concerning of abd distention   8/28 CT ABD & Pelvis reveals pancolitis  8/30: repeat CT scan of abd, gen surgery called to re-evaluated pt.  8/31: RUE duplex negative for DVT  9/4 NGT clamped, minimal residual. NGT removed. started sips   9/6 tolerating clear liquid diet  9/8 Bronch  9/9 1 prbc   9/10 increased tube feeds to 20cc/hr and tpn decreased to 63cc/hr from 125cc/hr   9/12 TPN discontinued  9/14 Diet advanced, tube feeds at 75% goal, central line removed and midline inserted.  9/14 pt completed approp 75 % of meal.   pt ambulated to the door this afternoon  9/15 Pt tolerating PO diet with supplemental tube feeds. calorie count iniated  9/16 VSS OOB in chair no acute distress transferred to [facility] nocturnal tube feeds 80 cc/hrx12 - RUE + edema Tacro level 7.2  PT disposition  to rehab.  9/177 VSS flat affect dronabinol added appetite stimulant at goal with Glucerna nocturnal feeds.. Rosas removed this am  9/18 VSS Remains on Glucerna TF, failed calorie count, Glucerna increased to 3 daily. Loose BM Improving--continue on Vanco 125mg QID taper as per ID.   9/19 VVS; RUE edema --> Vascular surgery consult called per CHF for prior occlusion of right subclavian.  Right Midline D/C'd . No evidence of DVT. CT Venogram of RUE to evaluate patency of subclavian  9/20 HD stable, cont PO vanco. Tacro level 9.4.   9/21    OOB to chait   VSS  Loose BM  this am   on vanco  9/22 VSS small oral intake with encouragement  this am. Calorie count in place  9/23 VSS appetite increasing oral intake improving per ID Vancomycin to be decreased to BID  in am.  9/24 HD stable.  Improving appetite.  Vanco decreased to bid.  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 Patient was treated w/ prednisone and Rituximab. Tacro was discontinued and patient was also transitioned to everolimus  Admitted  6/16-6/19  for hyperglycemia.  Reported to transplant team having one done black tarry stool-  instructed to  present to [facility] for hemolytic anemia. Patient has been following with Hematology outpatient.  Denies CP  N/V diarrhea SOB. (11 Aug 2020 20:08)  /11 Admitted to [facility] with symptomatic anemia  8/13 EGD for investigation of tarry stools  8/15 SB capsule: stomach & SB lesions, likely ulcers.  8/17 EGD/push enteroscopy w/ angioectasias/erosions in small bowel. Gastropathy and esophagitis. Ulcer seen on VCE most likely scope trauma.    8/18 VSS transferred back to floor.   8/20 VS 9.0/28.4 stable Gastric biopsy results LA Grade A esophagitis.  - Acute gastritis. Biopsies taken to r/o CMV, No ulceration seen despite finding on capsule endoscopy - likely 2/2 scope  trauma that has since resolved. Pathology pending.  8/21 VSS H/H  8.1/25.7  trending down will monitor prednisone taper 30 mg today. Procardia 120 initiated today RHC biopsy Monday.   8/22 VVS; Patient reports loose stools x 2-3 days with 1 episode today.  Stool sent for C-dif and GI PCR. Abdominal X-ray revealed: dilated loops of bowel. Abdomen distended.  Patient denies N/V. GI called to re-evaluate. Continue with current medication regimen.  Awaiting for upcoming cardiac biopsy on Monday 8/24.  8/23 VVS; creat up to 2.28, repeating CMP, TTE ordered Bladder scan   8/24 cardiac bx cancelled. elev creat  to 2.74   cardio renal cslt called, + CDIF  increase vanco to 500 q6 ID following  8/25 VSS: RHC today as per transplant team; transfuse 2 units PRBC today as per Dr. Viramontes; continue vanco for c-diff; transfer patient to CTU after cath today   8/26. Dieretic challenge with good response   8/27: Downgraded to the floor then upgraded to the CTU again for concerning of abd distention   8/28 CT ABD & Pelvis reveals pancolitis  8/30: repeat CT scan of abd, gen surgery called to re-evaluated pt.  8/31: RUE duplex negative for DVT  9/4 NGT clamped, minimal residual. NGT removed. started sips   9/6 tolerating clear liquid diet  9/8 Bronch  9/9 1 prbc   9/10 increased tube feeds to 20cc/hr and tpn decreased to 63cc/hr from 125cc/hr   9/12 TPN discontinued  9/14 Diet advanced, tube feeds at 75% goal, central line removed and midline inserted.  9/14 pt completed approp 75 % of meal.   pt ambulated to the door this afternoon  9/15 Pt tolerating PO diet with supplemental tube feeds. calorie count iniated  9/16 VSS OOB in chair no acute distress transferred to [address] [place] [address] nocturnal tube feeds 80 cc/hrx12 - RUE + edema Tacro level 7.2  PT disposition  to rehab.  9/177 VSS flat affect dronabinol added appetite stimulant at goal with Glucerna nocturnal feeds.. Rosas removed this am  9/18 VSS Remains on Glucerna TF, failed calorie count, Glucerna increased to 3 daily. Loose BM Improving--continue on Vanco 125mg QID taper as per ID.   9/19 VVS; RUE edema --> Vascular surgery consult called per CHF for prior occlusion of right subclavian.  Right Midline D/C'd . No evidence of DVT. CT Venogram of RUE to evaluate patency of subclavian  9/20 HD stable, cont PO vanco. Tacro level 9.4.   9/21    OOB to chait   VSS  Loose BM  this am   on vanco  9/22 VSS small oral intake with encouragement  this am. Calorie count in place  9/23 VSS appetite increasing oral intake improving per ID Vancomycin to be decreased to BID  in am.  9/24 HD stable.  Improving appetite.  Vanco decreased to bid x 1 week then 125mg QD x 1 week.  Calorie count in progress

## 2020-09-24 NOTE — PROGRESS NOTE ADULT - PROBLEM SELECTOR PLAN 4
Patient with ileus in setting of C.Diff- illeus resolved with bowel rest  TPN d/c   Gen  surgery Following no surgical intervention  CC/ low fiber diet  Nocturnal Glucerna tube feeds via Keofeed  Vancomycin PO decreased to 125mg bid  ID following Patient with ileus in setting of C.Diff- illeus resolved with bowel rest  TPN d/c   Gen  surgery Following no surgical intervention  CC/ low fiber diet  Nocturnal Glucerna tube feeds via Keofeed  Vancomycin PO decreased to 125mg bid x 1 week then 125mg QD x 1week til 10/8  ID following

## 2020-09-25 LAB
ANION GAP SERPL CALC-SCNC: 11 MMOL/L — SIGNIFICANT CHANGE UP (ref 5–17)
BILIRUB DIRECT SERPL-MCNC: 0.2 MG/DL — SIGNIFICANT CHANGE UP (ref 0–0.2)
BILIRUB INDIRECT FLD-MCNC: 0.6 MG/DL — SIGNIFICANT CHANGE UP (ref 0.2–1)
BILIRUB SERPL-MCNC: 0.8 MG/DL — SIGNIFICANT CHANGE UP (ref 0.2–1.2)
BUN SERPL-MCNC: 36 MG/DL — HIGH (ref 7–23)
CALCIUM SERPL-MCNC: 9.4 MG/DL — SIGNIFICANT CHANGE UP (ref 8.4–10.5)
CHLORIDE SERPL-SCNC: 101 MMOL/L — SIGNIFICANT CHANGE UP (ref 96–108)
CO2 SERPL-SCNC: 28 MMOL/L — SIGNIFICANT CHANGE UP (ref 22–31)
CREAT SERPL-MCNC: 1.26 MG/DL — SIGNIFICANT CHANGE UP (ref 0.5–1.3)
GLUCOSE BLDC GLUCOMTR-MCNC: 106 MG/DL — HIGH (ref 70–99)
GLUCOSE BLDC GLUCOMTR-MCNC: 108 MG/DL — HIGH (ref 70–99)
GLUCOSE BLDC GLUCOMTR-MCNC: 109 MG/DL — HIGH (ref 70–99)
GLUCOSE BLDC GLUCOMTR-MCNC: 111 MG/DL — HIGH (ref 70–99)
GLUCOSE BLDC GLUCOMTR-MCNC: 114 MG/DL — HIGH (ref 70–99)
GLUCOSE BLDC GLUCOMTR-MCNC: 156 MG/DL — HIGH (ref 70–99)
GLUCOSE BLDC GLUCOMTR-MCNC: 99 MG/DL — SIGNIFICANT CHANGE UP (ref 70–99)
GLUCOSE SERPL-MCNC: 79 MG/DL — SIGNIFICANT CHANGE UP (ref 70–99)
HAPTOGLOB SERPL-MCNC: 35 MG/DL — SIGNIFICANT CHANGE UP (ref 34–200)
HCT VFR BLD CALC: 34.8 % — LOW (ref 39–50)
HGB BLD-MCNC: 10.1 G/DL — LOW (ref 13–17)
LDH SERPL L TO P-CCNC: 459 U/L — HIGH (ref 50–242)
MCHC RBC-ENTMCNC: 29 GM/DL — LOW (ref 32–36)
MCHC RBC-ENTMCNC: 32 PG — SIGNIFICANT CHANGE UP (ref 27–34)
MCV RBC AUTO: 110.1 FL — HIGH (ref 80–100)
NRBC # BLD: 0 /100 WBCS — SIGNIFICANT CHANGE UP (ref 0–0)
PLATELET # BLD AUTO: 282 K/UL — SIGNIFICANT CHANGE UP (ref 150–400)
POTASSIUM SERPL-MCNC: 5.5 MMOL/L — HIGH (ref 3.5–5.3)
POTASSIUM SERPL-SCNC: 5.5 MMOL/L — HIGH (ref 3.5–5.3)
RBC # BLD: 3.16 M/UL — LOW (ref 4.2–5.8)
RBC # BLD: 3.16 M/UL — LOW (ref 4.2–5.8)
RBC # FLD: 23.9 % — HIGH (ref 10.3–14.5)
RETICS #: 265.8 K/UL — HIGH (ref 25–125)
RETICS/RBC NFR: 8.4 % — HIGH (ref 0.5–2.5)
SODIUM SERPL-SCNC: 140 MMOL/L — SIGNIFICANT CHANGE UP (ref 135–145)
TACROLIMUS SERPL-MCNC: 7 NG/ML — SIGNIFICANT CHANGE UP
WBC # BLD: 6.91 K/UL — SIGNIFICANT CHANGE UP (ref 3.8–10.5)
WBC # FLD AUTO: 6.91 K/UL — SIGNIFICANT CHANGE UP (ref 3.8–10.5)

## 2020-09-25 PROCEDURE — 99232 SBSQ HOSP IP/OBS MODERATE 35: CPT

## 2020-09-25 PROCEDURE — 99233 SBSQ HOSP IP/OBS HIGH 50: CPT

## 2020-09-25 RX ORDER — FUROSEMIDE 40 MG
40 TABLET ORAL DAILY
Refills: 0 | Status: DISCONTINUED | OUTPATIENT
Start: 2020-09-25 | End: 2020-10-02

## 2020-09-25 RX ORDER — TACROLIMUS 5 MG/1
5 CAPSULE ORAL
Refills: 0 | Status: DISCONTINUED | OUTPATIENT
Start: 2020-09-25 | End: 2020-09-28

## 2020-09-25 RX ADMIN — Medication 10 MILLIGRAM(S): at 09:57

## 2020-09-25 RX ADMIN — HUMAN INSULIN 5 UNIT(S): 100 INJECTION, SUSPENSION SUBCUTANEOUS at 18:02

## 2020-09-25 RX ADMIN — TACROLIMUS 5 MILLIGRAM(S): 5 CAPSULE ORAL at 20:13

## 2020-09-25 RX ADMIN — Medication 125 MILLIGRAM(S): at 06:35

## 2020-09-25 RX ADMIN — TACROLIMUS 4.5 MILLIGRAM(S): 5 CAPSULE ORAL at 09:57

## 2020-09-25 RX ADMIN — Medication 3 UNIT(S): at 07:57

## 2020-09-25 RX ADMIN — HUMAN INSULIN 5 UNIT(S): 100 INJECTION, SUSPENSION SUBCUTANEOUS at 06:35

## 2020-09-25 RX ADMIN — HEPARIN SODIUM 5000 UNIT(S): 5000 INJECTION INTRAVENOUS; SUBCUTANEOUS at 06:34

## 2020-09-25 RX ADMIN — Medication 5 MILLIGRAM(S): at 13:28

## 2020-09-25 RX ADMIN — Medication 40 MILLIGRAM(S): at 06:34

## 2020-09-25 RX ADMIN — SODIUM CHLORIDE 3 MILLILITER(S): 9 INJECTION INTRAMUSCULAR; INTRAVENOUS; SUBCUTANEOUS at 11:56

## 2020-09-25 RX ADMIN — HEPARIN SODIUM 5000 UNIT(S): 5000 INJECTION INTRAVENOUS; SUBCUTANEOUS at 13:30

## 2020-09-25 RX ADMIN — SODIUM CHLORIDE 3 MILLILITER(S): 9 INJECTION INTRAMUSCULAR; INTRAVENOUS; SUBCUTANEOUS at 22:18

## 2020-09-25 RX ADMIN — Medication 25 MILLIGRAM(S): at 06:34

## 2020-09-25 RX ADMIN — Medication 3 UNIT(S): at 13:29

## 2020-09-25 RX ADMIN — Medication 2: at 13:29

## 2020-09-25 RX ADMIN — Medication 125 MILLIGRAM(S): at 18:00

## 2020-09-25 RX ADMIN — PANTOPRAZOLE SODIUM 40 MILLIGRAM(S): 20 TABLET, DELAYED RELEASE ORAL at 06:34

## 2020-09-25 RX ADMIN — Medication 3 UNIT(S): at 18:04

## 2020-09-25 RX ADMIN — Medication 5 MILLIGRAM(S): at 06:34

## 2020-09-25 RX ADMIN — Medication 25 MILLIGRAM(S): at 22:43

## 2020-09-25 RX ADMIN — Medication 25 MILLIGRAM(S): at 13:41

## 2020-09-25 RX ADMIN — HEPARIN SODIUM 5000 UNIT(S): 5000 INJECTION INTRAVENOUS; SUBCUTANEOUS at 22:42

## 2020-09-25 RX ADMIN — SODIUM CHLORIDE 3 MILLILITER(S): 9 INJECTION INTRAMUSCULAR; INTRAVENOUS; SUBCUTANEOUS at 05:07

## 2020-09-25 NOTE — PROGRESS NOTE ADULT - ASSESSMENT
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 s/p EGD with esophagitis and gastritis. Developed Klebsiella oxytoca bacteremia requiring transfer to CTU. Clinically improving, transferred to [facility], finished 10-day of ceftriaxone, however, developed severe C.diff colitis on Vancomycin 500mg q6h PO and IV Flagyl. He had RHC on 8/25 and found to have high filling pressure so transferred to CTU again.    #C.diff colitis with NORMAN- C.diff PCR detected (8/23). Repeat CT on 8/30 without evidence of toxic megacolon.   - clinically  has hit a plateau and without significant improvement but also without deterioration  tolerating minimal amounts of oral food  continue po vanco but dose to 125mg QID  through 9/24 followed by Vanco 125mg tid for one week followed by Vanco 125mg bid x 1 week then Vancomycin 125mg QD for a week  continue Marinol to improve appetite  Asked stewardship program to investigate if Bezlotoxamab 10mg/kg as a single dose could be obtained to help reduce recurrence of infection in this immunocompromised patient-  trying to obtain for a dose next week prior to dc to rehab       #OI prophylaxis-  -Was previously receiving high dose steroids  -CMV viral load(8/17, 8/26): neg, needs repeat specimen  -Valcyte and Bactrim d/c'ed on 8/17 due to leukopenia  -Galactomann<0.5, Fungitell<31  -Steroids being tapered currently 10mg/day prednisone        Gary Lujan MD  [phone]  After 5pm/weekends [phone]

## 2020-09-25 NOTE — PROGRESS NOTE ADULT - SUBJECTIVE AND OBJECTIVE BOX
VITAL SIGNS    Telemetry:    Vital Signs Last 24 Hrs  T(C): 36.8 (20 @ 05:22), Max: 36.8 (20 @ 05:22)  T(F): 98.2 (20 @ 05:22), Max: 98.2 (20 @ 05:22)  HR: 120 (20 @ 05:22) (107 - 120)  BP: 125/77 (20 @ 05:22) (100/68 - 125/77)  RR: 18 (20 @ 05:22) (18 - 18)  SpO2: 97% (20 @ 05:22) (96% - 97%)             @ 07:01  -   @ 07:00  --------------------------------------------------------  IN: 1500 mL / OUT: 1750 mL / NET: -250 mL       Daily     Daily Weight in k.8 (24 Sep 2020 09:48)  Admit Wt: Drug Dosing Weight  Height (cm): 170.18 (17 Aug 2020 14:29)  Weight (kg): 75.2 (17 Aug 2020 14:29)  BMI (kg/m2): 26 (17 Aug 2020 14:29)  BSA (m2): 1.87 (17 Aug 2020 14:29)      CAPILLARY BLOOD GLUCOSE      POCT Blood Glucose.: 109 mg/dL (25 Sep 2020 07:45)  POCT Blood Glucose.: 108 mg/dL (25 Sep 2020 06:22)  POCT Blood Glucose.: 106 mg/dL (25 Sep 2020 00:04)  POCT Blood Glucose.: 99 mg/dL (24 Sep 2020 22:03)  POCT Blood Glucose.: 126 mg/dL (24 Sep 2020 17:04)  POCT Blood Glucose.: 103 mg/dL (24 Sep 2020 11:57)          MEDICATIONS  chlorhexidine 2% Cloths 1 Application(s) Topical <User Schedule>  dextrose 40% Gel 15 Gram(s) Oral once PRN  dextrose 5%. 1000 milliLiter(s) IV Continuous <Continuous>  dextrose 50% Injectable 12.5 Gram(s) IV Push once  dextrose 50% Injectable 25 Gram(s) IV Push once  dextrose 50% Injectable 25 Gram(s) IV Push once  dronabinol 5 milliGRAM(s) Oral <User Schedule>  furosemide   Injectable 40 milliGRAM(s) IV Push every 12 hours  glucagon  Injectable 1 milliGRAM(s) IntraMuscular once PRN  heparin   Injectable 5000 Unit(s) SubCutaneous every 8 hours  hydrALAZINE 25 milliGRAM(s) Oral every 8 hours  insulin lispro (HumaLOG) corrective regimen sliding scale   SubCutaneous every 6 hours  insulin lispro Injectable (HumaLOG) 3 Unit(s) SubCutaneous three times a day before meals  insulin NPH human recombinant 5 Unit(s) SubCutaneous every 12 hours  pantoprazole    Tablet 40 milliGRAM(s) Oral before breakfast  predniSONE   Tablet 10 milliGRAM(s) Oral <User Schedule>  sodium chloride 0.9% lock flush 10 milliLiter(s) IV Push every 1 hour PRN  sodium chloride 0.9% lock flush 3 milliLiter(s) IV Push every 8 hours  tacrolimus 5 milliGRAM(s) Oral <User Schedule>  tacrolimus 4.5 milliGRAM(s) Oral <User Schedule>  vancomycin    Solution 125 milliGRAM(s) Oral every 12 hours      >>> <<<  PHYSICAL EXAM  Subjective: nad  Neurology: alert and oriented x 3, nonfocal, no gross deficits  CV : s1s2  Sternal Wound :  CDI , Stable  Lungs: cta  Abdomen: soft, NT,ND, (+ )BM  :  voiding  Extremities: -c/c/e      LABS                             PAST MEDICAL & SURGICAL HISTORY:  H/O hemolytic anemia    H/O autoimmune hemolytic anemia    Knee pain, right    HLD (hyperlipidemia)    Former smoker    DVT of upper extremity (deep vein thrombosis)    Hepatitis C virus    GIB (gastrointestinal bleeding)    Ventricular fibrillation  s/p AICD    PAF (paroxysmal atrial fibrillation)  on xarelto    Non-Ischemic Cardiomyopathy    SVT (Supraventricular Tachycardia)    HTN    CHF (Congestive Heart Failure)    S/P right heart catheterization  biopsy multiple    H/O heart transplant  2018    Status post left hip replacement    History of Prior Ablation Treatment  for afib    AICD (Automatic Cardioverter/Defibrillator) Present  [employer] with 1 [employer] lead09- explanted and replaced with [employer] 2 leads on 09

## 2020-09-25 NOTE — PROGRESS NOTE ADULT - PROBLEM SELECTOR PLAN 3
Calorie count   cc/ low fiber diet  Glucerna 80 cc/ hrx12 nocturnal feeds  9/17 dronabinol added for appetite

## 2020-09-25 NOTE — PROGRESS NOTE ADULT - SUBJECTIVE AND OBJECTIVE BOX
INFECTIOUS DISEASES FOLLOW UP-- Sujey Lujan  [phone]    This is a follow up note for this  70yMale with  Anemia s/p OHTx  C.diff diarrhea- slowly improving        ROS:  CONSTITUTIONAL:  No fever, good appetite  CARDIOVASCULAR:  No chest pain or palpitations  RESPIRATORY:  No dyspnea  GASTROINTESTINAL:  No nausea, vomiting, no diarrhea, or abdominal pain  GENITOURINARY:  No dysuria  NEUROLOGIC:  No headache,     Allergies    No Known Allergies    Intolerances        ANTIBIOTICS/RELEVANT:  antimicrobials  vancomycin    Solution 125 milliGRAM(s) Oral every 12 hours    immunologic:  tacrolimus 5 milliGRAM(s) Oral <User Schedule>    OTHER:  chlorhexidine 2% Cloths 1 Application(s) Topical <User Schedule>  dextrose 40% Gel 15 Gram(s) Oral once PRN  dextrose 5%. 1000 milliLiter(s) IV Continuous <Continuous>  dextrose 50% Injectable 12.5 Gram(s) IV Push once  dextrose 50% Injectable 25 Gram(s) IV Push once  dextrose 50% Injectable 25 Gram(s) IV Push once  dronabinol 5 milliGRAM(s) Oral <User Schedule>  furosemide    Tablet 40 milliGRAM(s) Oral daily  glucagon  Injectable 1 milliGRAM(s) IntraMuscular once PRN  heparin   Injectable 5000 Unit(s) SubCutaneous every 8 hours  hydrALAZINE 25 milliGRAM(s) Oral every 8 hours  insulin lispro (HumaLOG) corrective regimen sliding scale   SubCutaneous every 6 hours  insulin lispro Injectable (HumaLOG) 3 Unit(s) SubCutaneous three times a day before meals  insulin NPH human recombinant 5 Unit(s) SubCutaneous every 12 hours  pantoprazole    Tablet 40 milliGRAM(s) Oral before breakfast  predniSONE   Tablet 10 milliGRAM(s) Oral <User Schedule>  sodium chloride 0.9% lock flush 10 milliLiter(s) IV Push every 1 hour PRN  sodium chloride 0.9% lock flush 3 milliLiter(s) IV Push every 8 hours      Objective:  Vital Signs Last 24 Hrs  T(C): 36.8 (25 Sep 2020 05:22), Max: 36.8 (25 Sep 2020 05:22)  T(F): 98.2 (25 Sep 2020 05:22), Max: 98.2 (25 Sep 2020 05:22)  HR: 127 (25 Sep 2020 12:38) (107 - 127)  BP: 100/67 (25 Sep 2020 12:38) (100/67 - 125/77)  BP(mean): --  RR: 18 (25 Sep 2020 12:38) (18 - 18)  SpO2: 96% (25 Sep 2020 12:38) (96% - 97%)    PHYSICAL EXAM:  Constitutional:no acute distress  Eyes:WALT, EOMI  Ear/Nose/Throat: no oral lesions, NG tube in place	  Respiratory: clear BL  Cardiovascular: S1S2  Gastrointestinal:soft, (+) BS, no tenderness  Extremities:no e/e/c RUE swelling improved  No Lymphadenopathy  IV sites not inflammed.    LABS:                        10.1   6.91  )-----------( 282      ( 25 Sep 2020 10:02 )             34.8     09-25    140  |  101  |  36<H>  ----------------------------<  79  5.5<H>   |  28  |  1.26    Ca    9.4      25 Sep 2020 10:02    TPro  x   /  Alb  x   /  TBili  0.8  /  DBili  0.2  /  AST  x   /  ALT  x   /  AlkPhos  x   09-25          MICROBIOLOGY:            RECENT CULTURES:      RADIOLOGY & ADDITIONAL STUDIES:

## 2020-09-25 NOTE — PROGRESS NOTE ADULT - PROBLEM SELECTOR PLAN 4
Patient with ileus in setting of C.Diff- illeus resolved with bowel rest  TPN d/c   Gen  surgery Following no surgical intervention  CC/ low fiber diet  Nocturnal Glucerna tube feeds via Keofeed  Vancomycin PO decreased to 125mg bid x 1 week then 125mg QD x 1week til 10/8  ID following

## 2020-09-25 NOTE — PROGRESS NOTE ADULT - PROBLEM SELECTOR PLAN 5
- Appropriate response to 1u pRBCs 9/10.  - No further evidence of hemolysis, LDH improving and haptolobin normal 9/18; repeat labs   - Appreciate Heme input. Given severity of C.diff infection, unable to give higher dose steroids at this time, but will reassess as he improves.  - Case reviewed with Blood Bank Director, Dr. Tomlinson.  - G6PD level normal

## 2020-09-25 NOTE — PROGRESS NOTE ADULT - PROBLEM SELECTOR PLAN 2
- Continue tacrolimus, goal will be ~10 as monotherapy (previously on everolimus); increase to 5/5 today  - Continue prednisone 10 mg daily, wean per heme  - Will need to resume statin and aspirin when stable.

## 2020-09-25 NOTE — PROGRESS NOTE ADULT - ASSESSMENT
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 Patient was treated w/ prednisone and Rituximab. Tacro was discontinued and patient was also transitioned to everolimus  Admitted  6/16-6/19  for hyperglycemia.  Reported to transplant team having one done black tarry stool-  instructed to  present to [facility] for hemolytic anemia. Patient has been following with Hematology outpatient.  Denies CP  N/V diarrhea SOB. (11 Aug 2020 20:08)  /11 Admitted to [facility] with symptomatic anemia  8/13 EGD for investigation of tarry stools  8/15 SB capsule: stomach & SB lesions, likely ulcers.  8/17 EGD/push enteroscopy w/ angioectasias/erosions in small bowel. Gastropathy and esophagitis. Ulcer seen on VCE most likely scope trauma.    8/18 VSS transferred back to floor.   8/20 VS 9.0/28.4 stable Gastric biopsy results LA Grade A esophagitis.  - Acute gastritis. Biopsies taken to r/o CMV, No ulceration seen despite finding on capsule endoscopy - likely 2/2 scope  trauma that has since resolved. Pathology pending.  8/21 VSS H/H  8.1/25.7  trending down will monitor prednisone taper 30 mg today. Procardia 120 initiated today RHC biopsy Monday.   8/22 VVS; Patient reports loose stools x 2-3 days with 1 episode today.  Stool sent for C-dif and GI PCR. Abdominal X-ray revealed: dilated loops of bowel. Abdomen distended.  Patient denies N/V. GI called to re-evaluate. Continue with current medication regimen.  Awaiting for upcoming cardiac biopsy on Monday 8/24.  8/23 VVS; creat up to 2.28, repeating CMP, TTE ordered Bladder scan   8/24 cardiac bx cancelled. elev creat  to 2.74   cardio renal cslt called, + CDIF  increase vanco to 500 q6 ID following  8/25 VSS: RHC today as per transplant team; transfuse 2 units PRBC today as per Dr. Viramontes; continue vanco for c-diff; transfer patient to CTU after cath today   8/26. Dieretic challenge with good response   8/27: Downgraded to the floor then upgraded to the CTU again for concerning of abd distention   8/28 CT ABD & Pelvis reveals pancolitis  8/30: repeat CT scan of abd, gen surgery called to re-evaluated pt.  8/31: RUE duplex negative for DVT  9/4 NGT clamped, minimal residual. NGT removed. started sips   9/6 tolerating clear liquid diet  9/8 Bronch  9/9 1 prbc   9/10 increased tube feeds to 20cc/hr and tpn decreased to 63cc/hr from 125cc/hr   9/12 TPN discontinued  9/14 Diet advanced, tube feeds at 75% goal, central line removed and midline inserted.  9/14 pt completed approp 75 % of meal.   pt ambulated to the door this afternoon  9/15 Pt tolerating PO diet with supplemental tube feeds. calorie count iniated  9/16 VSS OOB in chair no acute distress transferred to [address] [place] [address] nocturnal tube feeds 80 cc/hrx12 - RUE + edema Tacro level 7.2  PT disposition  to rehab.  9/177 VSS flat affect dronabinol added appetite stimulant at goal with Glucerna nocturnal feeds.. Rosas removed this am  9/18 VSS Remains on Glucerna TF, failed calorie count, Glucerna increased to 3 daily. Loose BM Improving--continue on Vanco 125mg QID taper as per ID.   9/19 VVS; RUE edema --> Vascular surgery consult called per CHF for prior occlusion of right subclavian.  Right Midline D/C'd . No evidence of DVT. CT Venogram of RUE to evaluate patency of subclavian  9/20 HD stable, cont PO vanco. Tacro level 9.4.   9/21    OOB to chait   VSS  Loose BM  this am   on vanco  9/22 VSS small oral intake with encouragement  this am. Calorie count in place  9/23 VSS appetite increasing oral intake improving per ID Vancomycin to be decreased to BID  in am.  9/24 HD stable.  Improving appetite.  Vanco decreased to bid x 1 week then 125mg QD x 1 week.  Calorie count in progress. 9/25 c/w present management

## 2020-09-25 NOTE — PROGRESS NOTE ADULT - ASSESSMENT
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 He was treated with Rituximab and high dose steroids. He is currently admitted with symptomatic anemia with Hgb initially of 6.6 requiring blood transfusions. Evaluation was not consistent with hemolysis. EGD/enteroscopy eventually revealed chronic gastritis and small bowel ectasias.  His course was complicated by development of severe leukopenia and acute respiratory distress and profound sinus tachycardia on 8/13 in setting of Klebsiella bacteremia of unclear origin (preceded EGD). A CT scan of the chest showed a possible infiltrate. While his bacteremia was treated, he subsequently developed C diff colitis with severe abdominal distention. He also developed worsening acute on chronic renal failure likely from volume overload which has resolved with diuretics. He has ongoing improvement in abdominal distention and leukocytosis. He was briefly on TPN but now tolerating enteral feeds. He is making slow progress but remains deconditioned but has improved appetite. He also has RUE pain/swelling likely related to known R subclavian occlusion with TPN administered through R midline which has since been discontinued and is improving.

## 2020-09-25 NOTE — PROGRESS NOTE ADULT - SUBJECTIVE AND OBJECTIVE BOX
Interval History:  doing well  RUE swelling improving  eating more     Medications:  chlorhexidine 2% Cloths 1 Application(s) Topical <User Schedule>  dextrose 40% Gel 15 Gram(s) Oral once PRN  dextrose 5%. 1000 milliLiter(s) IV Continuous <Continuous>  dextrose 50% Injectable 12.5 Gram(s) IV Push once  dextrose 50% Injectable 25 Gram(s) IV Push once  dextrose 50% Injectable 25 Gram(s) IV Push once  dronabinol 5 milliGRAM(s) Oral <User Schedule>  furosemide    Tablet 40 milliGRAM(s) Oral daily  glucagon  Injectable 1 milliGRAM(s) IntraMuscular once PRN  heparin   Injectable 5000 Unit(s) SubCutaneous every 8 hours  hydrALAZINE 25 milliGRAM(s) Oral every 8 hours  insulin lispro (HumaLOG) corrective regimen sliding scale   SubCutaneous every 6 hours  insulin lispro Injectable (HumaLOG) 3 Unit(s) SubCutaneous three times a day before meals  insulin NPH human recombinant 5 Unit(s) SubCutaneous every 12 hours  pantoprazole    Tablet 40 milliGRAM(s) Oral before breakfast  predniSONE   Tablet 10 milliGRAM(s) Oral <User Schedule>  sodium chloride 0.9% lock flush 3 milliLiter(s) IV Push every 8 hours  sodium chloride 0.9% lock flush 10 milliLiter(s) IV Push every 1 hour PRN  tacrolimus 5 milliGRAM(s) Oral <User Schedule>  vancomycin    Solution 125 milliGRAM(s) Oral every 12 hours      Vitals:  T(C): 36.8 (20 @ 05:22), Max: 36.8 (20 @ 05:22)  HR: 127 (20 @ 12:38) (107 - 127)  BP: 100/67 (20 @ 12:38) (100/67 - 125/77)  BP(mean): --  RR: 18 (20 @ 12:38) (18 - 18)  SpO2: 96% (20 @ 12:38) (96% - 97%)    Daily     Daily Weight in k.8 (25 Sep 2020 12:38)        I&O's Summary    24 Sep 2020 07:01  -  25 Sep 2020 07:00  --------------------------------------------------------  IN: 1500 mL / OUT: 1750 mL / NET: -250 mL    25 Sep 2020 07:01  -  25 Sep 2020 16:49  --------------------------------------------------------  IN: 300 mL / OUT: 350 mL / NET: -50 mL    Physical Exam:  Appearance: No Acute Distress  HEENT: PERRL  Neck: No JVD  Cardiovascular: Normal S1 S2, No murmurs/rubs/gallops  Respiratory: Clear to auscultation bilaterally  Gastrointestinal: Soft, Non-tender	  Skin: No cyanosis	  Neurologic: Non-focal  Extremities: trace LE edema; 1+ RUE edema  Psychiatry: A & O x 3, Mood & affect appropriate    Labs:                        10.1   6.91  )-----------( 282      ( 25 Sep 2020 10:02 )             34.8         140  |  101  |  36<H>  ----------------------------<  79  5.5<H>   |  28  |  1.26    Ca    9.4      25 Sep 2020 10:02    TPro  x   /  Alb  x   /  TBili  0.8  /  DBili  0.2  /  AST  x   /  ALT  x   /  AlkPhos  x               TELEMETRY:    Echocardiogram:

## 2020-09-26 LAB
GLUCOSE BLDC GLUCOMTR-MCNC: 104 MG/DL — HIGH (ref 70–99)
GLUCOSE BLDC GLUCOMTR-MCNC: 108 MG/DL — HIGH (ref 70–99)
GLUCOSE BLDC GLUCOMTR-MCNC: 130 MG/DL — HIGH (ref 70–99)
GLUCOSE BLDC GLUCOMTR-MCNC: 95 MG/DL — SIGNIFICANT CHANGE UP (ref 70–99)
GLUCOSE BLDC GLUCOMTR-MCNC: 99 MG/DL — SIGNIFICANT CHANGE UP (ref 70–99)

## 2020-09-26 PROCEDURE — 99232 SBSQ HOSP IP/OBS MODERATE 35: CPT

## 2020-09-26 RX ADMIN — SODIUM CHLORIDE 3 MILLILITER(S): 9 INJECTION INTRAMUSCULAR; INTRAVENOUS; SUBCUTANEOUS at 13:39

## 2020-09-26 RX ADMIN — TACROLIMUS 5 MILLIGRAM(S): 5 CAPSULE ORAL at 20:12

## 2020-09-26 RX ADMIN — Medication 125 MILLIGRAM(S): at 06:53

## 2020-09-26 RX ADMIN — CHLORHEXIDINE GLUCONATE 1 APPLICATION(S): 213 SOLUTION TOPICAL at 11:47

## 2020-09-26 RX ADMIN — SODIUM CHLORIDE 3 MILLILITER(S): 9 INJECTION INTRAMUSCULAR; INTRAVENOUS; SUBCUTANEOUS at 21:15

## 2020-09-26 RX ADMIN — Medication 5 MILLIGRAM(S): at 13:29

## 2020-09-26 RX ADMIN — HEPARIN SODIUM 5000 UNIT(S): 5000 INJECTION INTRAVENOUS; SUBCUTANEOUS at 21:15

## 2020-09-26 RX ADMIN — Medication 3 UNIT(S): at 07:51

## 2020-09-26 RX ADMIN — HEPARIN SODIUM 5000 UNIT(S): 5000 INJECTION INTRAVENOUS; SUBCUTANEOUS at 06:49

## 2020-09-26 RX ADMIN — Medication 5 MILLIGRAM(S): at 07:57

## 2020-09-26 RX ADMIN — HUMAN INSULIN 5 UNIT(S): 100 INJECTION, SUSPENSION SUBCUTANEOUS at 17:02

## 2020-09-26 RX ADMIN — Medication 3 UNIT(S): at 17:01

## 2020-09-26 RX ADMIN — Medication 25 MILLIGRAM(S): at 13:35

## 2020-09-26 RX ADMIN — Medication 3 UNIT(S): at 11:44

## 2020-09-26 RX ADMIN — Medication 40 MILLIGRAM(S): at 06:49

## 2020-09-26 RX ADMIN — TACROLIMUS 5 MILLIGRAM(S): 5 CAPSULE ORAL at 08:00

## 2020-09-26 RX ADMIN — PANTOPRAZOLE SODIUM 40 MILLIGRAM(S): 20 TABLET, DELAYED RELEASE ORAL at 06:52

## 2020-09-26 RX ADMIN — HEPARIN SODIUM 5000 UNIT(S): 5000 INJECTION INTRAVENOUS; SUBCUTANEOUS at 13:35

## 2020-09-26 RX ADMIN — Medication 10 MILLIGRAM(S): at 07:52

## 2020-09-26 RX ADMIN — Medication 25 MILLIGRAM(S): at 06:51

## 2020-09-26 RX ADMIN — HUMAN INSULIN 5 UNIT(S): 100 INJECTION, SUSPENSION SUBCUTANEOUS at 06:52

## 2020-09-26 RX ADMIN — Medication 125 MILLIGRAM(S): at 17:04

## 2020-09-26 RX ADMIN — Medication 25 MILLIGRAM(S): at 21:15

## 2020-09-26 RX ADMIN — SODIUM CHLORIDE 3 MILLILITER(S): 9 INJECTION INTRAMUSCULAR; INTRAVENOUS; SUBCUTANEOUS at 06:53

## 2020-09-26 NOTE — CHART NOTE - NSCHARTNOTEFT_GEN_A_CORE
Nutrition Follow Up Note  Patient seen for: Nutrition follow up / Calorie count     Source: RN, comprehensive chart review, interdisciplinary medical rounds, patient    Chart reviewed, events noted. 70 year old male with PMHx of NICM, s/p HM2 LVAD and OHT in 2018, with known coronary fistula. Recent admission for hemolytic anemia, now admitted for hyperglycemia and GIB. EGD and capsule study negative. Pt now C-Diff colitis w/o toxic megacolon. Required TPN for acute severe malnutrition in the setting of Colitis and ileus.  TPN has now been discontinued since .     Diet: Fiber/Residue Restricted (Low Fiber) + Glucerna Shakes TID + Glucerna 1.5 at 80ml/hr x 12 hrs (nocturnal feeds)     Patient reports: Pt observed resting in chair at time of RD visit. Reports good appetite at this time, and denies nausea/vomiting, abdominal pain. Enjoys Glucerna shakes (provided 3x daily). 3 day calorie count complete; results as follows:     Day 1: 1148kcal and 44gm protein   Day 2: 1034kcal and 42.5gm protein  Day 3: 855kcal and 39gm protein    3 day average: 1012kcal and 42g protein.     Estimated Needs: based on dosing Wt: 75.2 Kg,   Energy: (25-30kcal/kg): 1880-2256kcal   Protein:  (1.4-1.6g protein/kg): 105-135g protein    Based on results, pt consuming 54% of caloric needs and 40% of protein needs. With provision of energy/protein via EN (nocturnal feeds), pt with >100% of estimated energy and protein needs.     To note, pt reports consuming 2 hard boiled eggs and 2 Glucerna Shakes thus far today (). Lunch tray noted at bedside (chicken, mashed potatoes and carrots); pt endorsed looking forward to meal. Requesting to try Strawberry flavored Glucerna Shake.     Daily Weight in k.9 (), Weight in k.8 (), Weight in k.8 (-), Weight in k.3 (), Weight in k.1 (), Weight in k.9 ()  % Weight Change    Pertinent Medications: MEDICATIONS  (STANDING):  chlorhexidine 2% Cloths 1 Application(s) Topical <User Schedule>  dextrose 5%. 1000 milliLiter(s) (50 mL/Hr) IV Continuous <Continuous>  dextrose 50% Injectable 12.5 Gram(s) IV Push once  dextrose 50% Injectable 25 Gram(s) IV Push once  dextrose 50% Injectable 25 Gram(s) IV Push once  dronabinol 5 milliGRAM(s) Oral <User Schedule>  furosemide    Tablet 40 milliGRAM(s) Oral daily  heparin   Injectable 5000 Unit(s) SubCutaneous every 8 hours  hydrALAZINE 25 milliGRAM(s) Oral every 8 hours  insulin lispro (HumaLOG) corrective regimen sliding scale   SubCutaneous every 6 hours  insulin lispro Injectable (HumaLOG) 3 Unit(s) SubCutaneous three times a day before meals  insulin NPH human recombinant 5 Unit(s) SubCutaneous every 12 hours  pantoprazole    Tablet 40 milliGRAM(s) Oral before breakfast  predniSONE   Tablet 10 milliGRAM(s) Oral <User Schedule>  sodium chloride 0.9% lock flush 3 milliLiter(s) IV Push every 8 hours  tacrolimus 5 milliGRAM(s) Oral <User Schedule>  vancomycin    Solution 125 milliGRAM(s) Oral every 12 hours    MEDICATIONS  (PRN):  dextrose 40% Gel 15 Gram(s) Oral once PRN Blood Glucose LESS THAN 70 milliGRAM(s)/deciliter  glucagon  Injectable 1 milliGRAM(s) IntraMuscular once PRN Glucose LESS THAN 70 milligrams/deciliter  sodium chloride 0.9% lock flush 10 milliLiter(s) IV Push every 1 hour PRN Pre/post blood products, medications, blood draw, and to maintain line patency    Pertinent Labs:   Finger Sticks:  POCT Blood Glucose.: 130 mg/dL ( @ 11:30)  POCT Blood Glucose.: 104 mg/dL ( @ 07:23)  POCT Blood Glucose.: 95 mg/dL ( @ 06:28)  POCT Blood Glucose.: 99 mg/dL ( @ 00:13)  POCT Blood Glucose.: 99 mg/dL ( @ 21:47)  POCT Blood Glucose.: 111 mg/dL ( @ 17:37)  POCT Blood Glucose.: 114 mg/dL ( @ 14:25)  POCT Blood Glucose.: 156 mg/dL ( @ 12:58)      Skin per nursing documentation: no pressure injuries noted  Edema: 2+ left ankle; right ankle; right arm    Estimated Needs:     Estimated Needs: based on dosing Wt: 75.2 Kg,   Energy: (25-30kcal/kg): 1880-2256kcal   Protein:  (1.4-1.6g protein/kg): 105-135g protein    Previous Nutrition Diagnosis: altered nutrition lab values  Nutrition Diagnosis is: defer at this time    Previous Nutrition Diagnosis: inadequate oral intake  Nutrition Diagnosis is: on going at this time.     Previous Nutrition Diagnosis: acute severe protein calorie malnutrition  Nutrition Diagnosis: remains appropriate, ongoing diet advancement     New Nutrition Diagnosis: none     Interventions:     Recommend  1) Continue consistent carbohydrate, low fiber fiber diet.  2) Continue Glucerna Shakes 3x daily to further supplement PO intake.   3) Discussed with MD Viramontes - Plan to discontinue EN feeds; consider re-do 3 day calorie count to assess energy/protein intake WITHOUT provision of enteral nutrition. Calorie count to be reviewed by RD upon completion.   4) Trend BG levels, renal indices, LFT's, electrolytes and triglycerides   5) Trend GI tolerance.   6) Continue Marinol as able to stimulate appetite.     Monitoring and Evaluation:     Continue to monitor Nutritional intake, Tolerance to diet prescription, weights, labs, skin integrity    RD remains available upon request and will follow up per protocol

## 2020-09-26 NOTE — PROGRESS NOTE ADULT - ASSESSMENT
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 Patient was treated w/ prednisone and Rituximab. Tacro was discontinued and patient was also transitioned to everolimus  Admitted  6/16-6/19  for hyperglycemia.  Reported to transplant team having one done black tarry stool-  instructed to  present to [facility] for hemolytic anemia. Patient has been following with Hematology outpatient.  Denies CP  N/V diarrhea SOB. (11 Aug 2020 20:08)  /11 Admitted to [facility] with symptomatic anemia  8/13 EGD for investigation of tarry stools  8/15 SB capsule: stomach & SB lesions, likely ulcers.  8/17 EGD/push enteroscopy w/ angioectasias/erosions in small bowel. Gastropathy and esophagitis. Ulcer seen on VCE most likely scope trauma.    8/18 VSS transferred back to floor.   8/20 VS 9.0/28.4 stable Gastric biopsy results LA Grade A esophagitis.  - Acute gastritis. Biopsies taken to r/o CMV, No ulceration seen despite finding on capsule endoscopy - likely 2/2 scope  trauma that has since resolved. Pathology pending.  8/21 VSS H/H  8.1/25.7  trending down will monitor prednisone taper 30 mg today. Procardia 120 initiated today RHC biopsy Monday.   8/22 VVS; Patient reports loose stools x 2-3 days with 1 episode today.  Stool sent for C-dif and GI PCR. Abdominal X-ray revealed: dilated loops of bowel. Abdomen distended.  Patient denies N/V. GI called to re-evaluate. Continue with current medication regimen.  Awaiting for upcoming cardiac biopsy on Monday 8/24.  8/23 VVS; creat up to 2.28, repeating CMP, TTE ordered Bladder scan   8/24 cardiac bx cancelled. elev creat  to 2.74   cardio renal cslt called, + CDIF  increase vanco to 500 q6 ID following  8/25 VSS: RHC today as per transplant team; transfuse 2 units PRBC today as per Dr. Viramontes; continue vanco for c-diff; transfer patient to CTU after cath today   8/26. Dieretic challenge with good response   8/27: Downgraded to the floor then upgraded to the CTU again for concerning of abd distention   8/28 CT ABD & Pelvis reveals pancolitis  8/30: repeat CT scan of abd, gen surgery called to re-evaluated pt.  8/31: RUE duplex negative for DVT  9/4 NGT clamped, minimal residual. NGT removed. started sips   9/6 tolerating clear liquid diet  9/8 Bronch  9/9 1 prbc   9/10 increased tube feeds to 20cc/hr and tpn decreased to 63cc/hr from 125cc/hr   9/12 TPN discontinued  9/14 Diet advanced, tube feeds at 75% goal, central line removed and midline inserted.  9/14 pt completed approp 75 % of meal.   pt ambulated to the door this afternoon  9/15 Pt tolerating PO diet with supplemental tube feeds. calorie count iniated  9/16 VSS OOB in chair no acute distress transferred to [address] [place] [address] nocturnal tube feeds 80 cc/hrx12 - RUE + edema Tacro level 7.2  PT disposition  to rehab.  9/177 VSS flat affect dronabinol added appetite stimulant at goal with Glucerna nocturnal feeds.. Rosas removed this am  9/18 VSS Remains on Glucerna TF, failed calorie count, Glucerna increased to 3 daily. Loose BM Improving--continue on Vanco 125mg QID taper as per ID.   9/19 VVS; RUE edema --> Vascular surgery consult called per CHF for prior occlusion of right subclavian.  Right Midline D/C'd . No evidence of DVT. CT Venogram of RUE to evaluate patency of subclavian  9/20 HD stable, cont PO vanco. Tacro level 9.4.   9/21    OOB to chait   VSS  Loose BM  this am   on vanco  9/22 VSS small oral intake with encouragement  this am. Calorie count in place  9/23 VSS appetite increasing oral intake improving per ID Vancomycin to be decreased to BID  in am.  9/24 HD stable.  Improving appetite.  Vanco decreased to bid x 1 week then 125mg QD x 1 week.  Calorie count in progress.  9/25 c/w present management  9/26 VSS Lab holiday labs- Sunday-  RUE improving . Oral dietary intake  and appetite improving

## 2020-09-26 NOTE — PROGRESS NOTE ADULT - SUBJECTIVE AND OBJECTIVE BOX
Subjective " Hello I am feeling better"    VITAL SIGNS    Telemetry:      Vital Signs Last 24 Hrs  T(C): 36.8 (20 @ 11:51), Max: 36.8 (20 @ 11:51)  T(F): 98.2 (20 @ 11:51), Max: 98.2 (20 @ 11:51)  HR: 93 (20 @ 11:51) (93 - 127)  BP: 130/65 (20 @ 11:51) (100/67 - 130/65)  RR: 18 (20 @ 11:51) (18 - 18)  SpO2: 96% (20 @ 11:51) (95% - 99%)            @ 07:01  -   @ 07:00  --------------------------------------------------------  IN: 620 mL / OUT: 450 mL / NET: 170 mL     @ 07:01  -   @ 12:22  --------------------------------------------------------  IN: 240 mL / OUT: 250 mL / NET: -10 mL    MEDICATIONS  (STANDING):  chlorhexidine 2% Cloths 1 Application(s) Topical <User Schedule>  dronabinol 5 milliGRAM(s) Oral <User Schedule>  furosemide    Tablet 40 milliGRAM(s) Oral daily  heparin   Injectable 5000 Unit(s) SubCutaneous every 8 hours  hydrALAZINE 25 milliGRAM(s) Oral every 8 hours  insulin lispro (HumaLOG) corrective regimen sliding scale   SubCutaneous every 6 hours  insulin lispro Injectable (HumaLOG) 3 Unit(s) SubCutaneous three times a day before meals  insulin NPH human recombinant 5 Unit(s) SubCutaneous every 12 hours  pantoprazole    Tablet 40 milliGRAM(s) Oral before breakfast  predniSONE   Tablet 10 milliGRAM(s) Oral <User Schedule>  sodium chloride 0.9% lock flush 3 milliLiter(s) IV Push every 8 hours  tacrolimus 5 milliGRAM(s) Oral <User Schedule>  vancomycin    Solution 125 milliGRAM(s) Oral every 12 hours    MEDICATIONS  (PRN):  dextrose 40% Gel 15 Gram(s) Oral once PRN Blood Glucose LESS THAN 70 milliGRAM(s)/deciliter  glucagon  Injectable 1 milliGRAM(s) IntraMuscular once PRN Glucose LESS THAN 70 milligrams/deciliter  sodium chloride 0.9% lock flush 10 milliLiter(s) IV Push every 1 hour PRN Pre/post blood products, medications, blood draw, and to maintain line patency    Daily Weight in k.9 (26 Sep 2020 10:49)    CAPILLARY BLOOD GLUCOSE  POCT Blood Glucose.: 130 mg/dL (26 Sep 2020 11:30)  POCT Blood Glucose.: 104 mg/dL (26 Sep 2020 07:23)  POCT Blood Glucose.: 95 mg/dL (26 Sep 2020 06:28)  POCT Blood Glucose.: 99 mg/dL (26 Sep 2020 00:13)  POCT Blood Glucose.: 99 mg/dL (25 Sep 2020 21:47)  POCT Blood Glucose.: 111 mg/dL (25 Sep 2020 17:37)  POCT Blood Glucose.: 114 mg/dL (25 Sep 2020 14:25)  POCT Blood Glucose.: 156 mg/dL (25 Sep 2020 12:58)        PHYSICAL EXAM  Neurology: alert and oriented x 3, nonfocal, no gross deficits  CV : S1 S2RRR  sternal Wound :  NAZ healed sternum stable    Lungs: B/l CTA on room air    Abdomen: soft, nontender, nondistended, positive bowel sounds, last bowel movement     :   voids            Extremities: Equal strength throughout   RUE warm well perfused edema  improving    LUE PIV in hand stable  B/L LE warm well perfused + DP                                    Physical Therapy Rec:   Home  [  ]   Home w/ PT  [  ]  Rehab  [ x ]    Discussed with Cardiothoracic Team at AM rounds.

## 2020-09-27 LAB
ANION GAP SERPL CALC-SCNC: 14 MMOL/L — SIGNIFICANT CHANGE UP (ref 5–17)
BILIRUB DIRECT SERPL-MCNC: 0.2 MG/DL — SIGNIFICANT CHANGE UP (ref 0–0.2)
BILIRUB INDIRECT FLD-MCNC: 0.6 MG/DL — SIGNIFICANT CHANGE UP (ref 0.2–1)
BILIRUB SERPL-MCNC: 0.8 MG/DL — SIGNIFICANT CHANGE UP (ref 0.2–1.2)
BUN SERPL-MCNC: 32 MG/DL — HIGH (ref 7–23)
CALCIUM SERPL-MCNC: 9.4 MG/DL — SIGNIFICANT CHANGE UP (ref 8.4–10.5)
CHLORIDE SERPL-SCNC: 99 MMOL/L — SIGNIFICANT CHANGE UP (ref 96–108)
CO2 SERPL-SCNC: 24 MMOL/L — SIGNIFICANT CHANGE UP (ref 22–31)
CREAT SERPL-MCNC: 1.34 MG/DL — HIGH (ref 0.5–1.3)
GLUCOSE BLDC GLUCOMTR-MCNC: 104 MG/DL — HIGH (ref 70–99)
GLUCOSE BLDC GLUCOMTR-MCNC: 116 MG/DL — HIGH (ref 70–99)
GLUCOSE BLDC GLUCOMTR-MCNC: 124 MG/DL — HIGH (ref 70–99)
GLUCOSE BLDC GLUCOMTR-MCNC: 72 MG/DL — SIGNIFICANT CHANGE UP (ref 70–99)
GLUCOSE BLDC GLUCOMTR-MCNC: 77 MG/DL — SIGNIFICANT CHANGE UP (ref 70–99)
GLUCOSE SERPL-MCNC: 97 MG/DL — SIGNIFICANT CHANGE UP (ref 70–99)
HAPTOGLOB SERPL-MCNC: 33 MG/DL — LOW (ref 34–200)
HCT VFR BLD CALC: 37.2 % — LOW (ref 39–50)
HGB BLD-MCNC: 10.9 G/DL — LOW (ref 13–17)
LDH SERPL L TO P-CCNC: 382 U/L — HIGH (ref 50–242)
MAGNESIUM SERPL-MCNC: 2.2 MG/DL — SIGNIFICANT CHANGE UP (ref 1.6–2.6)
MCHC RBC-ENTMCNC: 29.3 GM/DL — LOW (ref 32–36)
MCHC RBC-ENTMCNC: 31.7 PG — SIGNIFICANT CHANGE UP (ref 27–34)
MCV RBC AUTO: 108.1 FL — HIGH (ref 80–100)
NRBC # BLD: 0 /100 WBCS — SIGNIFICANT CHANGE UP (ref 0–0)
PHOSPHATE SERPL-MCNC: 5.2 MG/DL — HIGH (ref 2.5–4.5)
PLATELET # BLD AUTO: 302 K/UL — SIGNIFICANT CHANGE UP (ref 150–400)
POTASSIUM SERPL-MCNC: 4.8 MMOL/L — SIGNIFICANT CHANGE UP (ref 3.5–5.3)
POTASSIUM SERPL-SCNC: 4.8 MMOL/L — SIGNIFICANT CHANGE UP (ref 3.5–5.3)
RBC # BLD: 3.44 M/UL — LOW (ref 4.2–5.8)
RBC # BLD: 3.44 M/UL — LOW (ref 4.2–5.8)
RBC # FLD: 23.1 % — HIGH (ref 10.3–14.5)
RETICS #: 266.9 K/UL — HIGH (ref 25–125)
RETICS/RBC NFR: 7.8 % — HIGH (ref 0.5–2.5)
SODIUM SERPL-SCNC: 137 MMOL/L — SIGNIFICANT CHANGE UP (ref 135–145)
TACROLIMUS SERPL-MCNC: 8.1 NG/ML — SIGNIFICANT CHANGE UP
WBC # BLD: 5.42 K/UL — SIGNIFICANT CHANGE UP (ref 3.8–10.5)
WBC # FLD AUTO: 5.42 K/UL — SIGNIFICANT CHANGE UP (ref 3.8–10.5)

## 2020-09-27 PROCEDURE — 99233 SBSQ HOSP IP/OBS HIGH 50: CPT

## 2020-09-27 PROCEDURE — 99232 SBSQ HOSP IP/OBS MODERATE 35: CPT

## 2020-09-27 RX ORDER — INSULIN LISPRO 100/ML
VIAL (ML) SUBCUTANEOUS AT BEDTIME
Refills: 0 | Status: DISCONTINUED | OUTPATIENT
Start: 2020-09-27 | End: 2020-10-02

## 2020-09-27 RX ORDER — INSULIN LISPRO 100/ML
VIAL (ML) SUBCUTANEOUS
Refills: 0 | Status: DISCONTINUED | OUTPATIENT
Start: 2020-09-27 | End: 2020-10-02

## 2020-09-27 RX ADMIN — Medication 5 MILLIGRAM(S): at 08:06

## 2020-09-27 RX ADMIN — HUMAN INSULIN 5 UNIT(S): 100 INJECTION, SUSPENSION SUBCUTANEOUS at 16:57

## 2020-09-27 RX ADMIN — Medication 3 UNIT(S): at 11:27

## 2020-09-27 RX ADMIN — TACROLIMUS 5 MILLIGRAM(S): 5 CAPSULE ORAL at 08:10

## 2020-09-27 RX ADMIN — SODIUM CHLORIDE 3 MILLILITER(S): 9 INJECTION INTRAMUSCULAR; INTRAVENOUS; SUBCUTANEOUS at 21:06

## 2020-09-27 RX ADMIN — HEPARIN SODIUM 5000 UNIT(S): 5000 INJECTION INTRAVENOUS; SUBCUTANEOUS at 06:30

## 2020-09-27 RX ADMIN — Medication 10 MILLIGRAM(S): at 08:07

## 2020-09-27 RX ADMIN — PANTOPRAZOLE SODIUM 40 MILLIGRAM(S): 20 TABLET, DELAYED RELEASE ORAL at 06:30

## 2020-09-27 RX ADMIN — SODIUM CHLORIDE 3 MILLILITER(S): 9 INJECTION INTRAMUSCULAR; INTRAVENOUS; SUBCUTANEOUS at 06:30

## 2020-09-27 RX ADMIN — Medication 25 MILLIGRAM(S): at 13:30

## 2020-09-27 RX ADMIN — Medication 125 MILLIGRAM(S): at 06:30

## 2020-09-27 RX ADMIN — Medication 25 MILLIGRAM(S): at 21:05

## 2020-09-27 RX ADMIN — TACROLIMUS 5 MILLIGRAM(S): 5 CAPSULE ORAL at 20:19

## 2020-09-27 RX ADMIN — HUMAN INSULIN 5 UNIT(S): 100 INJECTION, SUSPENSION SUBCUTANEOUS at 06:31

## 2020-09-27 RX ADMIN — Medication 25 MILLIGRAM(S): at 06:30

## 2020-09-27 RX ADMIN — Medication 125 MILLIGRAM(S): at 17:06

## 2020-09-27 RX ADMIN — Medication 40 MILLIGRAM(S): at 06:30

## 2020-09-27 RX ADMIN — CHLORHEXIDINE GLUCONATE 1 APPLICATION(S): 213 SOLUTION TOPICAL at 10:26

## 2020-09-27 RX ADMIN — HEPARIN SODIUM 5000 UNIT(S): 5000 INJECTION INTRAVENOUS; SUBCUTANEOUS at 13:30

## 2020-09-27 RX ADMIN — Medication 3 UNIT(S): at 16:54

## 2020-09-27 RX ADMIN — HEPARIN SODIUM 5000 UNIT(S): 5000 INJECTION INTRAVENOUS; SUBCUTANEOUS at 21:05

## 2020-09-27 RX ADMIN — Medication 5 MILLIGRAM(S): at 13:29

## 2020-09-27 RX ADMIN — Medication 3 UNIT(S): at 08:06

## 2020-09-27 RX ADMIN — SODIUM CHLORIDE 3 MILLILITER(S): 9 INJECTION INTRAMUSCULAR; INTRAVENOUS; SUBCUTANEOUS at 13:32

## 2020-09-27 NOTE — PROGRESS NOTE ADULT - PROBLEM SELECTOR PLAN 3
Calorie count    low potassium regular diet   glucerna can bid    9/17 dronabinol added for appetite

## 2020-09-27 NOTE — PROGRESS NOTE ADULT - ASSESSMENT
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 He was treated with Rituximab and high dose steroids. He is currently admitted with symptomatic anemia with Hgb initially of 6.6 requiring blood transfusions. Evaluation was not consistent with hemolysis. EGD/enteroscopy eventually revealed chronic gastritis and small bowel ectasias.  His course was complicated by development of severe leukopenia and acute respiratory distress and profound sinus tachycardia on 8/13 in setting of Klebsiella bacteremia of unclear origin (preceded EGD). A CT scan of the chest showed a possible infiltrate. While his bacteremia was treated, he subsequently developed C diff colitis with severe abdominal distention. He also developed worsening acute on chronic renal failure likely from volume overload which has resolved with diuretics. He has ongoing improvement in abdominal distention and leukocytosis. He was briefly on TPN but now tolerating enteral feeds. He is making slow progress but remains deconditioned but has improved appetite. He also has RUE pain/swelling likely related to known R subclavian occlusion with TPN administered through R midline which has since been discontinued and is improving. Appetite improving and awaiting repeat calorie count before NG tube is pulled.

## 2020-09-27 NOTE — PROGRESS NOTE ADULT - PROBLEM SELECTOR PLAN 4
Patient with ileus in setting of C.Diff- illeus resolved with bowel rest  TPN d/c   Gen  surgery Following no surgical intervention  Vancomycin PO decreased to 125mg bid x 1 week then 125mg QD x 1week til 10/8  ID following

## 2020-09-27 NOTE — PROGRESS NOTE ADULT - PROBLEM SELECTOR PLAN 2
- Continue tacrolimus, goal will be ~10 as monotherapy (previously on everolimus); c/w 5/5   - Continue prednisone 10 mg daily, wean per heme  - Will need to resume statin and aspirin when stable.

## 2020-09-27 NOTE — PROGRESS NOTE ADULT - ASSESSMENT
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 Patient was treated w/ prednisone and Rituximab. Tacro was discontinued and patient was also transitioned to everolimus  Admitted  6/16-6/19  for hyperglycemia.  Reported to transplant team having one done black tarry stool-  instructed to  present to [facility] for hemolytic anemia. Patient has been following with Hematology outpatient.  Denies CP  N/V diarrhea SOB. (11 Aug 2020 20:08)  /11 Admitted to [facility] with symptomatic anemia  8/13 EGD for investigation of tarry stools  8/15 SB capsule: stomach & SB lesions, likely ulcers.  8/17 EGD/push enteroscopy w/ angioectasias/erosions in small bowel. Gastropathy and esophagitis. Ulcer seen on VCE most likely scope trauma.    8/18 VSS transferred back to floor.   8/20 VS 9.0/28.4 stable Gastric biopsy results LA Grade A esophagitis.  - Acute gastritis. Biopsies taken to r/o CMV, No ulceration seen despite finding on capsule endoscopy - likely 2/2 scope  trauma that has since resolved. Pathology pending.  8/21 VSS H/H  8.1/25.7  trending down will monitor prednisone taper 30 mg today. Procardia 120 initiated today RHC biopsy Monday.   8/22 VVS; Patient reports loose stools x 2-3 days with 1 episode today.  Stool sent for C-dif and GI PCR. Abdominal X-ray revealed: dilated loops of bowel. Abdomen distended.  Patient denies N/V. GI called to re-evaluate. Continue with current medication regimen.  Awaiting for upcoming cardiac biopsy on Monday 8/24.  8/23 VVS; creat up to 2.28, repeating CMP, TTE ordered Bladder scan   8/24 cardiac bx cancelled. elev creat  to 2.74   cardio renal cslt called, + CDIF  increase vanco to 500 q6 ID following  8/25 VSS: RHC today as per transplant team; transfuse 2 units PRBC today as per Dr. Viramontes; continue vanco for c-diff; transfer patient to CTU after cath today   8/26. Dieretic challenge with good response   8/27: Downgraded to the floor then upgraded to the CTU again for concerning of abd distention   8/28 CT ABD & Pelvis reveals pancolitis  8/30: repeat CT scan of abd, gen surgery called to re-evaluated pt.  8/31: RUE duplex negative for DVT  9/4 NGT clamped, minimal residual. NGT removed. started sips   9/6 tolerating clear liquid diet  9/8 Bronch  9/9 1 prbc   9/10 increased tube feeds to 20cc/hr and tpn decreased to 63cc/hr from 125cc/hr   9/12 TPN discontinued  9/14 Diet advanced, tube feeds at 75% goal, central line removed and midline inserted.  9/14 pt completed approp 75 % of meal.   pt ambulated to the door this afternoon  9/15 Pt tolerating PO diet with supplemental tube feeds. calorie count iniated  9/16 VSS OOB in chair no acute distress transferred to [address] [place] [address] nocturnal tube feeds 80 cc/hrx12 - RUE + edema Tacro level 7.2  PT disposition  to rehab.  9/177 VSS flat affect dronabinol added appetite stimulant at goal with Glucerna nocturnal feeds.. Rosas removed this am  9/18 VSS Remains on Glucerna TF, failed calorie count, Glucerna increased to 3 daily. Loose BM Improving--continue on Vanco 125mg QID taper as per ID.   9/19 VVS; RUE edema --> Vascular surgery consult called per CHF for prior occlusion of right subclavian.  Right Midline D/C'd . No evidence of DVT. CT Venogram of RUE to evaluate patency of subclavian  9/20 HD stable, cont PO vanco. Tacro level 9.4.   9/21    OOB to chait   VSS  Loose BM  this am   on vanco  9/22 VSS small oral intake with encouragement  this am. Calorie count in place  9/23 VSS appetite increasing oral intake improving per ID Vancomycin to be decreased to BID  in am.  9/24 HD stable.  Improving appetite.  Vanco decreased to bid x 1 week then 125mg QD x 1 week.  Calorie count in progress.  9/25 c/w present management  9/26 VSS Lab holiday labs- Sunday-  RUE improving . Oral dietary intake  and appetite improving  9/27 VSS Low potassium diet nocturnal feeds d/c  on calorie count- labs this am

## 2020-09-27 NOTE — PROGRESS NOTE ADULT - SUBJECTIVE AND OBJECTIVE BOX
Interval History:  feels well  denies complaints  normal BMs  RUE improving    Medications:  chlorhexidine 2% Cloths 1 Application(s) Topical <User Schedule>  dextrose 40% Gel 15 Gram(s) Oral once PRN  dextrose 5%. 1000 milliLiter(s) IV Continuous <Continuous>  dextrose 50% Injectable 12.5 Gram(s) IV Push once  dextrose 50% Injectable 25 Gram(s) IV Push once  dextrose 50% Injectable 25 Gram(s) IV Push once  dronabinol 5 milliGRAM(s) Oral <User Schedule>  furosemide    Tablet 40 milliGRAM(s) Oral daily  glucagon  Injectable 1 milliGRAM(s) IntraMuscular once PRN  heparin   Injectable 5000 Unit(s) SubCutaneous every 8 hours  hydrALAZINE 25 milliGRAM(s) Oral every 8 hours  insulin lispro (HumaLOG) corrective regimen sliding scale   SubCutaneous every 6 hours  insulin lispro Injectable (HumaLOG) 3 Unit(s) SubCutaneous three times a day before meals  insulin NPH human recombinant 5 Unit(s) SubCutaneous every 12 hours  pantoprazole    Tablet 40 milliGRAM(s) Oral before breakfast  predniSONE   Tablet 10 milliGRAM(s) Oral <User Schedule>  sodium chloride 0.9% lock flush 10 milliLiter(s) IV Push every 1 hour PRN  sodium chloride 0.9% lock flush 3 milliLiter(s) IV Push every 8 hours  tacrolimus 5 milliGRAM(s) Oral <User Schedule>  vancomycin    Solution 125 milliGRAM(s) Oral every 12 hours      Vitals:  T(C): 36.4 (20 @ 05:00), Max: 36.6 (20 @ 19:40)  HR: 139 (20 @ 05:00) (110 - 139)  BP: 143/98 (20 @ 05:00) (110/72 - 143/98)  BP(mean): --  RR: 18 (20 @ 05:00) (18 - 18)  SpO2: 99% (20 @ 05:00) (97% - 99%)    Daily     Daily Weight in k.1 (27 Sep 2020 09:57)        I&O's Summary    26 Sep 2020 07:01  -  27 Sep 2020 07:00  --------------------------------------------------------  IN: 540 mL / OUT: 750 mL / NET: -210 mL    27 Sep 2020 07:  -  27 Sep 2020 17:46  --------------------------------------------------------  IN: 120 mL / OUT: 200 mL / NET: -80 mL    Physical Exam:  Appearance: No Acute Distress  HEENT: PERRL  Neck: No JVD  Cardiovascular: Normal S1 S2, No murmurs/rubs/gallops  Respiratory: Clear to auscultation bilaterally  Gastrointestinal: Soft, Non-tender	  Skin: No cyanosis	  Neurologic: Non-focal  Extremities: No LE edema; RUE 1+ edema  Psychiatry: A & O x 3, Mood & affect appropriate    Labs:                        10.9   5.42  )-----------( 302      ( 27 Sep 2020 10:25 )             37.2         137  |  99  |  32<H>  ----------------------------<  97  4.8   |  24  |  1.34<H>    Ca    9.4      27 Sep 2020 10:25  Phos  5.2       Mg     2.2         TPro  x   /  Alb  x   /  TBili  0.8  /  DBili  0.2  /  AST  x   /  ALT  x   /  AlkPhos  x

## 2020-09-27 NOTE — PROGRESS NOTE ADULT - SUBJECTIVE AND OBJECTIVE BOX
Subjective: " Hello I am feeling better"    VITAL SIGNS    Telemetry:  SR1 110-120    Vital Signs Last 24 Hrs  T(C): 36.4 (20 @ 05:00), Max: 36.8 (20 @ 11:51)  T(F): 97.5 (20 @ 05:00), Max: 98.2 (20 @ 11:51)  HR: 139 (20 @ 05:00) (93 - 139)  BP: 143/98 (20 @ 05:00) (110/72 - 143/98)  RR: 18 (20 @ 05:00) (18 - 18)  SpO2: 99% (20 @ 05:00) (96% - 99%)            @ 07:01  -   @ 07:00  --------------------------------------------------------  IN: 540 mL / OUT: 750 mL / NET: -210 mL     Daily Weight in k.9 (26 Sep 2020 10:49)  MEDICATIONS  (STANDING):  chlorhexidine 2% Cloths 1 Application(s) Topical <User Schedule>  dronabinol 5 milliGRAM(s) Oral <User Schedule>  furosemide    Tablet 40 milliGRAM(s) Oral daily  heparin   Injectable 5000 Unit(s) SubCutaneous every 8 hours  hydrALAZINE 25 milliGRAM(s) Oral every 8 hours  insulin lispro (HumaLOG) corrective regimen sliding scale   SubCutaneous every 6 hours  insulin lispro Injectable (HumaLOG) 3 Unit(s) SubCutaneous three times a day before meals  insulin NPH human recombinant 5 Unit(s) SubCutaneous every 12 hours  pantoprazole    Tablet 40 milliGRAM(s) Oral before breakfast  predniSONE   Tablet 10 milliGRAM(s) Oral <User Schedule>  sodium chloride 0.9% lock flush 3 milliLiter(s) IV Push every 8 hours  tacrolimus 5 milliGRAM(s) Oral <User Schedule>  vancomycin    Solution 125 milliGRAM(s) Oral every 12 hours    MEDICATIONS  (PRN):  dextrose 40% Gel 15 Gram(s) Oral once PRN Blood Glucose LESS THAN 70 milliGRAM(s)/deciliter  glucagon  Injectable 1 milliGRAM(s) IntraMuscular once PRN Glucose LESS THAN 70 milligrams/deciliter  sodium chloride 0.9% lock flush 10 milliLiter(s) IV Push every 1 hour PRN Pre/post blood products, medications, blood draw, and to maintain line patency      POCT Blood Glucose.: 104 mg/dL (27 Sep 2020 07:57)  POCT Blood Glucose.: 77 mg/dL (27 Sep 2020 06:15)  POCT Blood Glucose.: 72 mg/dL (27 Sep 2020 00:02)  POCT Blood Glucose.: 108 mg/dL (26 Sep 2020 16:48)  POCT Blood Glucose.: 130 mg/dL (26 Sep 2020 11:30)               PHYSICAL EXAM  Neurology: alert and oriented x 3, nonfocal, no gross deficits  CV : S1 S2 RRR    Sternal Wound :  Sternum, Stable    Lungs: B/l CTA onroom air    Abdomen: Keofeed in place soft, nontender, nondistended, positive bowel sounds, last bowel movement     :  voiding             Extremities: Equal strength throughout      RUE edema improving   B/lle warm well perfused - edema                                    Physical Therapy Rec:   Home  [  ]   Home w/ PT  [  ]  Rehab  [ x ]    Discussed with Cardiothoracic Team at AM rounds.

## 2020-09-28 DIAGNOSIS — B25.9 CYTOMEGALOVIRAL DISEASE, UNSPECIFIED: ICD-10-CM

## 2020-09-28 LAB
ANION GAP SERPL CALC-SCNC: 11 MMOL/L — SIGNIFICANT CHANGE UP (ref 5–17)
BUN SERPL-MCNC: 30 MG/DL — HIGH (ref 7–23)
CALCIUM SERPL-MCNC: 8.8 MG/DL — SIGNIFICANT CHANGE UP (ref 8.4–10.5)
CHLORIDE SERPL-SCNC: 100 MMOL/L — SIGNIFICANT CHANGE UP (ref 96–108)
CO2 SERPL-SCNC: 25 MMOL/L — SIGNIFICANT CHANGE UP (ref 22–31)
CREAT SERPL-MCNC: 1.48 MG/DL — HIGH (ref 0.5–1.3)
GLUCOSE BLDC GLUCOMTR-MCNC: 107 MG/DL — HIGH (ref 70–99)
GLUCOSE BLDC GLUCOMTR-MCNC: 113 MG/DL — HIGH (ref 70–99)
GLUCOSE BLDC GLUCOMTR-MCNC: 75 MG/DL — SIGNIFICANT CHANGE UP (ref 70–99)
GLUCOSE BLDC GLUCOMTR-MCNC: 86 MG/DL — SIGNIFICANT CHANGE UP (ref 70–99)
GLUCOSE BLDC GLUCOMTR-MCNC: 91 MG/DL — SIGNIFICANT CHANGE UP (ref 70–99)
GLUCOSE SERPL-MCNC: 101 MG/DL — HIGH (ref 70–99)
HCT VFR BLD CALC: 35.5 % — LOW (ref 39–50)
HGB BLD-MCNC: 10.5 G/DL — LOW (ref 13–17)
MAGNESIUM SERPL-MCNC: 2.1 MG/DL — SIGNIFICANT CHANGE UP (ref 1.6–2.6)
MCHC RBC-ENTMCNC: 29.6 GM/DL — LOW (ref 32–36)
MCHC RBC-ENTMCNC: 32 PG — SIGNIFICANT CHANGE UP (ref 27–34)
MCV RBC AUTO: 108.2 FL — HIGH (ref 80–100)
NRBC # BLD: 0 /100 WBCS — SIGNIFICANT CHANGE UP (ref 0–0)
PHOSPHATE SERPL-MCNC: 4.8 MG/DL — HIGH (ref 2.5–4.5)
PLATELET # BLD AUTO: 324 K/UL — SIGNIFICANT CHANGE UP (ref 150–400)
POTASSIUM SERPL-MCNC: 5.2 MMOL/L — SIGNIFICANT CHANGE UP (ref 3.5–5.3)
POTASSIUM SERPL-SCNC: 5.2 MMOL/L — SIGNIFICANT CHANGE UP (ref 3.5–5.3)
RBC # BLD: 3.28 M/UL — LOW (ref 4.2–5.8)
RBC # FLD: 22.4 % — HIGH (ref 10.3–14.5)
SODIUM SERPL-SCNC: 136 MMOL/L — SIGNIFICANT CHANGE UP (ref 135–145)
TACROLIMUS SERPL-MCNC: 22.8 NG/ML — SIGNIFICANT CHANGE UP
WBC # BLD: 5.98 K/UL — SIGNIFICANT CHANGE UP (ref 3.8–10.5)
WBC # FLD AUTO: 5.98 K/UL — SIGNIFICANT CHANGE UP (ref 3.8–10.5)

## 2020-09-28 PROCEDURE — 99232 SBSQ HOSP IP/OBS MODERATE 35: CPT

## 2020-09-28 PROCEDURE — 99233 SBSQ HOSP IP/OBS HIGH 50: CPT

## 2020-09-28 PROCEDURE — 71045 X-RAY EXAM CHEST 1 VIEW: CPT | Mod: 26

## 2020-09-28 RX ORDER — TACROLIMUS 5 MG/1
5 CAPSULE ORAL
Refills: 0 | Status: DISCONTINUED | OUTPATIENT
Start: 2020-09-28 | End: 2020-10-02

## 2020-09-28 RX ADMIN — SODIUM CHLORIDE 3 MILLILITER(S): 9 INJECTION INTRAMUSCULAR; INTRAVENOUS; SUBCUTANEOUS at 22:18

## 2020-09-28 RX ADMIN — Medication 10 MILLIGRAM(S): at 07:51

## 2020-09-28 RX ADMIN — HEPARIN SODIUM 5000 UNIT(S): 5000 INJECTION INTRAVENOUS; SUBCUTANEOUS at 05:49

## 2020-09-28 RX ADMIN — TACROLIMUS 5 MILLIGRAM(S): 5 CAPSULE ORAL at 23:01

## 2020-09-28 RX ADMIN — PANTOPRAZOLE SODIUM 40 MILLIGRAM(S): 20 TABLET, DELAYED RELEASE ORAL at 05:49

## 2020-09-28 RX ADMIN — CHLORHEXIDINE GLUCONATE 1 APPLICATION(S): 213 SOLUTION TOPICAL at 09:00

## 2020-09-28 RX ADMIN — Medication 40 MILLIGRAM(S): at 05:49

## 2020-09-28 RX ADMIN — HEPARIN SODIUM 5000 UNIT(S): 5000 INJECTION INTRAVENOUS; SUBCUTANEOUS at 22:18

## 2020-09-28 RX ADMIN — HEPARIN SODIUM 5000 UNIT(S): 5000 INJECTION INTRAVENOUS; SUBCUTANEOUS at 12:52

## 2020-09-28 RX ADMIN — Medication 5 MILLIGRAM(S): at 12:51

## 2020-09-28 RX ADMIN — Medication 5 MILLIGRAM(S): at 07:43

## 2020-09-28 RX ADMIN — Medication 3 UNIT(S): at 11:35

## 2020-09-28 RX ADMIN — Medication 25 MILLIGRAM(S): at 05:49

## 2020-09-28 RX ADMIN — Medication 25 MILLIGRAM(S): at 22:18

## 2020-09-28 RX ADMIN — HUMAN INSULIN 5 UNIT(S): 100 INJECTION, SUSPENSION SUBCUTANEOUS at 07:53

## 2020-09-28 RX ADMIN — SODIUM CHLORIDE 3 MILLILITER(S): 9 INJECTION INTRAMUSCULAR; INTRAVENOUS; SUBCUTANEOUS at 05:48

## 2020-09-28 RX ADMIN — Medication 3 UNIT(S): at 07:53

## 2020-09-28 RX ADMIN — SODIUM CHLORIDE 3 MILLILITER(S): 9 INJECTION INTRAMUSCULAR; INTRAVENOUS; SUBCUTANEOUS at 14:00

## 2020-09-28 RX ADMIN — TACROLIMUS 5 MILLIGRAM(S): 5 CAPSULE ORAL at 07:45

## 2020-09-28 RX ADMIN — Medication 3 UNIT(S): at 17:21

## 2020-09-28 RX ADMIN — Medication 125 MILLIGRAM(S): at 05:48

## 2020-09-28 RX ADMIN — Medication 125 MILLIGRAM(S): at 17:16

## 2020-09-28 NOTE — PROGRESS NOTE ADULT - SUBJECTIVE AND OBJECTIVE BOX
INFECTIOUS DISEASES FOLLOW UP-- Sujey Lujan  [phone]    This is a follow up note for this  70yMale with  Anemia  s/p OHtx 2.5 yrs PTA  current admission for klebsiella oxytoca bacteremia followed by sever comlicated C.diff colitis  improved NG tube was removed, appetite improving        ROS:  CONSTITUTIONAL:  No fever, better appetite  CARDIOVASCULAR:  No chest pain or palpitations  RESPIRATORY:  No dyspnea  GASTROINTESTINAL:  No nausea, vomiting, diarrhea, or abdominal pain  GENITOURINARY:  No dysuria  NEUROLOGIC:  No headache,     Allergies    No Known Allergies    Intolerances        ANTIBIOTICS/RELEVANT:  antimicrobials  vancomycin    Solution 125 milliGRAM(s) Oral every 12 hours    immunologic:  tacrolimus 5 milliGRAM(s) Oral <User Schedule>    OTHER:  chlorhexidine 2% Cloths 1 Application(s) Topical <User Schedule>  dextrose 40% Gel 15 Gram(s) Oral once PRN  dextrose 5%. 1000 milliLiter(s) IV Continuous <Continuous>  dextrose 50% Injectable 12.5 Gram(s) IV Push once  dextrose 50% Injectable 25 Gram(s) IV Push once  dextrose 50% Injectable 25 Gram(s) IV Push once  dronabinol 5 milliGRAM(s) Oral <User Schedule>  furosemide    Tablet 40 milliGRAM(s) Oral daily  glucagon  Injectable 1 milliGRAM(s) IntraMuscular once PRN  heparin   Injectable 5000 Unit(s) SubCutaneous every 8 hours  hydrALAZINE 25 milliGRAM(s) Oral every 8 hours  insulin lispro (HumaLOG) corrective regimen sliding scale   SubCutaneous three times a day before meals  insulin lispro (HumaLOG) corrective regimen sliding scale   SubCutaneous at bedtime  insulin lispro Injectable (HumaLOG) 3 Unit(s) SubCutaneous three times a day before meals  pantoprazole    Tablet 40 milliGRAM(s) Oral before breakfast  predniSONE   Tablet 10 milliGRAM(s) Oral <User Schedule>  sodium chloride 0.9% lock flush 10 milliLiter(s) IV Push every 1 hour PRN  sodium chloride 0.9% lock flush 3 milliLiter(s) IV Push every 8 hours      Objective:  Vital Signs Last 24 Hrs  T(C): 36.6 (28 Sep 2020 12:27), Max: 36.7 (27 Sep 2020 20:18)  T(F): 97.9 (28 Sep 2020 12:27), Max: 98 (27 Sep 2020 20:18)  HR: 121 (28 Sep 2020 11:55) (99 - 121)  BP: 95/72 (28 Sep 2020 14:00) (95/72 - 108/76)  BP(mean): --  RR: 18 (28 Sep 2020 05:40) (18 - 18)  SpO2: 98% (28 Sep 2020 11:55) (98% - 100%)    PHYSICAL EXAM:  Constitutional:no acute distress  Eyes:WALT, EOMI  Ear/Nose/Throat: no oral lesions, 	  Respiratory: clear BL  Cardiovascular: S1S2  Gastrointestinal:soft, (+) BS, no tenderness  Extremities:no e/e/c  No Lymphadenopathy  IV sites not inflammed.    LABS:                        10.5   5.98  )-----------( 324      ( 28 Sep 2020 11:23 )             35.5     09-28    136  |  100  |  30<H>  ----------------------------<  101<H>  5.2   |  25  |  1.48<H>    Ca    8.8      28 Sep 2020 11:23  Phos  4.8     09-28  Mg     2.1     09-28    TPro  x   /  Alb  x   /  TBili  0.8  /  DBili  0.2  /  AST  x   /  ALT  x   /  AlkPhos  x   09-27          MICROBIOLOGY:    none recently        RECENT CULTURES:      RADIOLOGY & ADDITIONAL STUDIES:    < from: Xray Chest 1 View- PORTABLE-Routine (Xray Chest 1 View- PORTABLE-Routine in AM.) (09.28.20 @ 09:15) >  IMPRESSION:    Right basilar linear atelectasis.    < end of copied text >

## 2020-09-28 NOTE — PROGRESS NOTE ADULT - ASSESSMENT
Mr. 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 He was treated with Rituximab and high dose steroids. He is currently admitted with symptomatic anemia with Hgb initially of 6.6 requiring blood transfusions. Evaluation was not consistent with hemolysis. EGD/enteroscopy eventually revealed chronic gastritis and small bowel ectasias.  His course was complicated by development of severe leukopenia and acute respiratory distress and profound sinus tachycardia on 8/13 in setting of Klebsiella bacteremia of unclear origin (preceded EGD). A CT scan of the chest showed a possible infiltrate. While his bacteremia was treated, he subsequently developed C diff colitis with severe abdominal distention. He also developed worsening acute on chronic renal failure likely from volume overload which has resolved with diuretics. He has ongoing improvement in abdominal distention and leukocytosis. He was briefly on TPN but now tolerating enteral feeds. He is making progress but remains deconditioned but has improved appetite. He also has RUE pain/swelling likely related to known R subclavian occlusion with TPN administered through R midline which has since been discontinued and is improving.       Plan discussed in multidisciplinary round with [facility] NP team and CT surgery.

## 2020-09-28 NOTE — PROVIDER CONTACT NOTE (CRITICAL VALUE NOTIFICATION) - ACTION/TREATMENT ORDERED:
Patient will get Tacro at 11pm tonight, and 11am 9/29/20 and Tacro level in am
With order for transfusion of one unit PRBC.

## 2020-09-28 NOTE — PROGRESS NOTE ADULT - ASSESSMENT
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 Patient was treated w/ prednisone and Rituximab. Tacro was discontinued and patient was also transitioned to everolimus  Admitted  6/16-6/19  for hyperglycemia.  Reported to transplant team having one done black tarry stool-  instructed to  present to [facility] for hemolytic anemia. Patient has been following with Hematology outpatient.  Denies CP  N/V diarrhea SOB. (11 Aug 2020 20:08)  /11 Admitted to [facility] with symptomatic anemia  8/13 EGD for investigation of tarry stools  8/15 SB capsule: stomach & SB lesions, likely ulcers.  8/17 EGD/push enteroscopy w/ angioectasias/erosions in small bowel. Gastropathy and esophagitis. Ulcer seen on VCE most likely scope trauma.    8/18 VSS transferred back to floor.   8/20 VS 9.0/28.4 stable Gastric biopsy results LA Grade A esophagitis.  - Acute gastritis. Biopsies taken to r/o CMV, No ulceration seen despite finding on capsule endoscopy - likely 2/2 scope  trauma that has since resolved. Pathology pending.  8/21 VSS H/H  8.1/25.7  trending down will monitor prednisone taper 30 mg today. Procardia 120 initiated today RHC biopsy Monday.   8/22 VVS; Patient reports loose stools x 2-3 days with 1 episode today.  Stool sent for C-dif and GI PCR. Abdominal X-ray revealed: dilated loops of bowel. Abdomen distended.  Patient denies N/V. GI called to re-evaluate. Continue with current medication regimen.  Awaiting for upcoming cardiac biopsy on Monday 8/24.  8/23 VVS; creat up to 2.28, repeating CMP, TTE ordered Bladder scan   8/24 cardiac bx cancelled. elev creat  to 2.74   cardio renal cslt called, + CDIF  increase vanco to 500 q6 ID following  8/25 VSS: RHC today as per transplant team; transfuse 2 units PRBC today as per Dr. Viramontes; continue vanco for c-diff; transfer patient to CTU after cath today   8/26. Dieretic challenge with good response   8/27: Downgraded to the floor then upgraded to the CTU again for concerning of abd distention   8/28 CT ABD & Pelvis reveals pancolitis  8/30: repeat CT scan of abd, gen surgery called to re-evaluated pt.  8/31: RUE duplex negative for DVT  9/4 NGT clamped, minimal residual. NGT removed. started sips   9/6 tolerating clear liquid diet  9/8 Bronch  9/9 1 prbc   9/10 increased tube feeds to 20cc/hr and tpn decreased to 63cc/hr from 125cc/hr   9/12 TPN discontinued  9/14 Diet advanced, tube feeds at 75% goal, central line removed and midline inserted.  9/14 pt completed approp 75 % of meal.   pt ambulated to the door this afternoon  9/15 Pt tolerating PO diet with supplemental tube feeds. calorie count iniated  9/16 VSS OOB in chair no acute distress transferred to [address] [place] [address] nocturnal tube feeds 80 cc/hrx12 - RUE + edema Tacro level 7.2  PT disposition  to rehab.  9/177 VSS flat affect dronabinol added appetite stimulant at goal with Glucerna nocturnal feeds.. Rosas removed this am  9/18 VSS Remains on Glucerna TF, failed calorie count, Glucerna increased to 3 daily. Loose BM Improving--continue on Vanco 125mg QID taper as per ID.   9/19 VVS; RUE edema --> Vascular surgery consult called per CHF for prior occlusion of right subclavian.  Right Midline D/C'd . No evidence of DVT. CT Venogram of RUE to evaluate patency of subclavian  9/20 HD stable, cont PO vanco. Tacro level 9.4.   9/21    OOB to chait   VSS  Loose BM  this am   on vanco  9/22 VSS small oral intake with encouragement  this am. Calorie count in place  9/23 VSS appetite increasing oral intake improving per ID Vancomycin to be decreased to BID  in am.  9/24 HD stable.  Improving appetite.  Vanco decreased to bid x 1 week then 125mg QD x 1 week.  Calorie count in progress.  9/25 c/w present management  9/26 VSS Lab holiday labs- Sunday-  RUE improving . Oral dietary intake  and appetite improving  9/27 VSS Low potassium diet nocturnal feeds d/c  on calorie count- labs this am  9/28   zaria snell ,    vss

## 2020-09-28 NOTE — PROGRESS NOTE ADULT - ASSESSMENT
Mood upbeat. Sitting in chair. Worked with PT today, ambulated in hallway with cane, moved from chair to bed and back. Feeling "good, much better."  Happy with progress he's made. Looking forward to getting stronger at rehab and then home. Appetite good. Sleeping well. Denies symptoms of anxiety/depression. Reviewed strategies for coping with stress. Receptive to support and validation.      Dx: Adjustment disorder; unspecified F43.20; r/o delirium F05     Recommendations:   Explain all information in simple, concise language with teach back to ensure understanding  Include support system in all education   Holistic RN  Behavioral Cardiology will continue to follow

## 2020-09-28 NOTE — PROGRESS NOTE ADULT - SUBJECTIVE AND OBJECTIVE BOX
Subjective: No current complaints. He feels well. He is not dizzy. His abdomen does not hurt. He continues to have loose stools, but no joshua diarrhea.     Medications:  chlorhexidine 2% Cloths 1 Application(s) Topical <User Schedule>  dextrose 40% Gel 15 Gram(s) Oral once PRN  dextrose 5%. 1000 milliLiter(s) IV Continuous <Continuous>  dextrose 50% Injectable 12.5 Gram(s) IV Push once  dextrose 50% Injectable 25 Gram(s) IV Push once  dextrose 50% Injectable 25 Gram(s) IV Push once  dronabinol 5 milliGRAM(s) Oral <User Schedule>  furosemide    Tablet 40 milliGRAM(s) Oral daily  glucagon  Injectable 1 milliGRAM(s) IntraMuscular once PRN  heparin   Injectable 5000 Unit(s) SubCutaneous every 8 hours  hydrALAZINE 25 milliGRAM(s) Oral every 8 hours  insulin lispro (HumaLOG) corrective regimen sliding scale   SubCutaneous three times a day before meals  insulin lispro (HumaLOG) corrective regimen sliding scale   SubCutaneous at bedtime  insulin lispro Injectable (HumaLOG) 3 Unit(s) SubCutaneous three times a day before meals  insulin NPH human recombinant 5 Unit(s) SubCutaneous every 12 hours  pantoprazole    Tablet 40 milliGRAM(s) Oral before breakfast  predniSONE   Tablet 10 milliGRAM(s) Oral <User Schedule>  sodium chloride 0.9% lock flush 3 milliLiter(s) IV Push every 8 hours  sodium chloride 0.9% lock flush 10 milliLiter(s) IV Push every 1 hour PRN  tacrolimus 5 milliGRAM(s) Oral <User Schedule>  vancomycin    Solution 125 milliGRAM(s) Oral every 12 hours      Physical Exam:    Vital Signs Last 24 Hrs  T(C): 36.5 (28 Sep 2020 05:40), Max: 36.7 (27 Sep 2020 20:18)  T(F): 97.7 (28 Sep 2020 05:40), Max: 98 (27 Sep 2020 20:18)  HR: 121 (28 Sep 2020 11:55) (99 - 121)  BP: 96/64 (28 Sep 2020 11:55) (96/64 - 108/76)  RR: 18 (28 Sep 2020 05:40) (18 - 18)  SpO2: 98% (28 Sep 2020 11:55) (98% - 100%)     Daily Weight in k.4 (28 Sep 2020 07:53)    I&O's Summary    27 Sep 2020 07:  -  28 Sep 2020 07:00  --------------------------------------------------------  IN: 240 mL / OUT: 1000 mL / NET: -760 mL    28 Sep 2020 07:  -  28 Sep 2020 12:16  --------------------------------------------------------  IN: 0 mL / OUT: 200 mL / NET: -200 mL    General: No distress. Comfortable. Frail appearing.   HEENT: EOM intact.  Neck: Neck supple. JVP not elevated. No masses  Chest: Clear to auscultation bilaterally  CV: Tachycardic. Normal S1 and S2. No gallops. Radial pulses weak. No dependent edema.   Abdomen: Soft, non-distended, non-tender  Skin: No rashes or skin breakdown. Right arm swollen relative to left arm.   Neurology: Alert and oriented times three. Sensation intact  Psych: Affect normal    Labs:                        10.5   5.98  )-----------( 324      ( 28 Sep 2020 11:23 )             35.5         136  |  100  |  30<H>  ----------------------------<  101<H>  5.2   |  25  |  1.48<H>    Ca    8.8      28 Sep 2020 11:23  Phos  4.8       Mg     2.1         TPro  x   /  Alb  x   /  TBili  0.8  /  DBili  0.2  /  AST  x   /  ALT  x   /  AlkPhos  x       Lactate Dehydrogenase, Serum: 382 U/L ( @ 10:25)    Tacrolimus (), Serum: 8.1: Tacrolimus testing is performed on the [employer]  by   chemiluminescent microparticle immunoassay. The therapeutic range of   tacrolimus is not clearly defined but target 12-hour trough whole blood   concentrations are 5.0 - 20.0 ng/mL early post transplant. Twenty-four   hour   trough concentrations are 33-50% less than the corresponding 12-hour   trough. ng/mL (20 @ 11:07)

## 2020-09-28 NOTE — PROGRESS NOTE ADULT - PROBLEM SELECTOR PLAN 3
Calorie count     dc keofeed today   low potassium regular diet   glucerna can bid    9/17 dronabinol added for appetite

## 2020-09-28 NOTE — CHART NOTE - NSCHARTNOTEFT_GEN_A_CORE
Nutrition Follow Up Note    Patient seen for: Malnutrition follow up     Source: RN, Medical record, HF team,     Chart reviewed, events noted. 70 year old male with PMHx of NICM, s/p HM2 LVAD and OHT in 2018, with known coronary fistula. Recent admission for hemolytic anemia, now admitted for hyperglycemia and GIB. EGD and capsule study negative. Pt now C-Diff colitis w/o toxic megacolon. Required TPN for acute severe malnutrition in the setting of Colitis and ileus.  TPN has now been discontinued since . tolerating PO diet well since NGT removed.   Discharge planning     Diet : No concentrated K+ and Glucerna x2 daily     Patient reports: Pt seen reports feeling well today.   Pt on a 3 day calorie count from -, to assess PO intake in the absences of EN via NGT. RD will review upon follow up.     Pt has been drinking Glucerna 2-3 times daily. Appears to have a strong appetite, asking for food preferences and consuming about 50% of meals. Pt states he is pushing himself daily more and more to improve PO intake.     pt denies any GI distress at this time. Pt aware RD remains available.     Daily Weight in k.4 (-), Weight in k.1 (-), Weight in k.9 (), Weight in k.8 (), Weight in k.8 (-), Weight in k.3 (-), Weight in k.1 ()    Drug Dosing Weight  Weight (kg): 75.2 (17 Aug 2020 14:29)  BMI (kg/m2): 26 (17 Aug 2020 14:29)      Pertinent Medications: MEDICATIONS  (STANDING):  chlorhexidine 2% Cloths 1 Application(s) Topical <User Schedule>  dextrose 5%. 1000 milliLiter(s) (50 mL/Hr) IV Continuous <Continuous>  dextrose 50% Injectable 25 Gram(s) IV Push once  dextrose 50% Injectable 12.5 Gram(s) IV Push once  dextrose 50% Injectable 25 Gram(s) IV Push once  dronabinol 5 milliGRAM(s) Oral <User Schedule>  furosemide    Tablet 40 milliGRAM(s) Oral daily  heparin   Injectable 5000 Unit(s) SubCutaneous every 8 hours  hydrALAZINE 25 milliGRAM(s) Oral every 8 hours  insulin lispro (HumaLOG) corrective regimen sliding scale   SubCutaneous three times a day before meals  insulin lispro (HumaLOG) corrective regimen sliding scale   SubCutaneous at bedtime  insulin lispro Injectable (HumaLOG) 3 Unit(s) SubCutaneous three times a day before meals  pantoprazole    Tablet 40 milliGRAM(s) Oral before breakfast  predniSONE   Tablet 10 milliGRAM(s) Oral <User Schedule>  sodium chloride 0.9% lock flush 3 milliLiter(s) IV Push every 8 hours  tacrolimus 5 milliGRAM(s) Oral <User Schedule>  vancomycin    Solution 125 milliGRAM(s) Oral every 12 hours    MEDICATIONS  (PRN):  dextrose 40% Gel 15 Gram(s) Oral once PRN Blood Glucose LESS THAN 70 milliGRAM(s)/deciliter  glucagon  Injectable 1 milliGRAM(s) IntraMuscular once PRN Glucose LESS THAN 70 milligrams/deciliter  sodium chloride 0.9% lock flush 10 milliLiter(s) IV Push every 1 hour PRN Pre/post blood products, medications, blood draw, and to maintain line patency      LABS:    @ 11:23: Sodium 136, Potassium 5.2, Calcium 8.8, Magnesium 2.1, Phosphorus 4.8<H>, BUN 30<H>, Creatinine 1.48<H>, Glucose 101<H>, Alk Phos --, ALT/SGPT --, AST/SGOT --, Albumin --, Prealbumin --, Total Bilirubin --, Hemoglobin 10.5<L>, Hematocrit 35.5<L>, Ferritin --, C-Reactive Protein --, Creatine Kinase <<27>      Finger Sticks:  POCT Blood Glucose.: 107 mg/dL ( @ 11:35)  POCT Blood Glucose.: 86 mg/dL ( @ 07:50)  POCT Blood Glucose.: 116 mg/dL ( @ 16:50)      Skin per nursing documentation: intact  Edema: +1 tara Foot edema     Estimated Needs: based on dosing Wt: 75.2 Kg,   Energy: (25-30kcal/kg): 1880-2256kcal   Protein:  (1.4-1.6g protein/kg): 105-135g protein    Previous Nutrition Diagnosis: altered nutrition lab values  Nutrition Diagnosis is: defer at this time     Previous Nutrition Diagnosis: inadequate oral intake  Nutrition Diagnosis is: on going at this time.     Previous Nutrition Diagnosis: acute severe protein calorie malnutrition  Nutrition Diagnosis: remains appropriate, ongoing diet advancement     New Nutrition Diagnosis: None     Recommended Interventions:   1. Continue regular diet, no concentrated K+   2. Increase Glucerna to 3 x daily to further supplement PO Intake   3. EN Discontinued, and NGT removed.   4. Continue Marinol as able to stimulant appetite.   5. 3 day calorie count in progress, -. RD will review upon completion   6. Trend GI tolerance   7. Trend BG levels, renal indices, LFT's, electrolytes and triglycerides     Monitoring and Evaluation:   Continue to monitor nutritional intake, tolerance to diet prescription, weights, labs, skin integrity  RD remains available upon request and will follow up per protocol  Gabbi Flowers RD, CDN, [facility] Pager #[phone].

## 2020-09-28 NOTE — PROGRESS NOTE ADULT - SUBJECTIVE AND OBJECTIVE BOX
Behavioral Cardiology Psychological Assessment     History of present illness: Mr. Baez is a 70 year old man with history of dilated nonischemic cardiomyopathy, s/p OHT 2018, CKD III, HCV s/p treatment, and recently diagnosed autoimmune hemolytic anemia who is admitted with symptomatic anemia with Hgb of 6.6. Found to have chronic gastritis and small bowel ectasias. Has worsening abdominal distention with finding of ileus. CT scan with pancolitis. Started on IVIG , rectal vanc and Eravacylcine on .    Social history: , lives in a private house he owns in [place]. His younger brother (Rivas Davey) lives with him. Pt’s wife   and his only son was killed 5 years ago. He has an 19 y/o granddaughter that lives nearby with her mother and just started college this week. Reports a close relationship with his siblings (6 brothers and 1 sister) all but one brother live in [place]. Identified his adopted "godbrother" (Nawaf Barahona [phone]) as primary support person and HCP. Identified a close friend (Tahira) who lives close by as additional support and alternate HCP; she is very involved in his care. His brother (Rivas Davey age 45) lives with him. Mother  (age 79); father . He has 2 daughters from a previous relationship but is not part of their lives. Worked as a  at [employer] for 40+ years; retired 2 years ago. On disability. Information obtained from patient and chart. Education: 10th Grade (limited literacy skills).     Past psychiatric history: In past experienced depression related to bereavement issues following wife’s death () and son’s death (). Never sought treatment. Denies any other psychiatric issues. No history of s/a. No inpatient psychiatric admissions.     Psychological assessment: Mood upbeat. Sitting in chair. Worked with PT today, ambulated in hallway with cane, moved from chair to bed and back. Feeling "good" and happy with progress. Looking forward to going to rehab and then home. Appetite good. Sleeping well. Denies symptoms of anxiety/depression. Reviewed strategies for coping with stress. Receptive to support and validation.      Mental status exam: Seen sitting in chair. Pleasant and cooperative, well related with good eye contact. Awake, oriented x2-3 (unable to identify date). Speech normal rate/volume. Thought process goal directed. No evidence of any psychosis, jona, delusions. Mood "I may go to rehab today." Affect full range. No s/i. Insight and judgment adequate.

## 2020-09-28 NOTE — PROGRESS NOTE ADULT - PROBLEM SELECTOR PLAN 5
- Decrease hydralazine to 12.5 mg PO TID with the goal to wean off. If he needs an antihypertensive, we will start amlodipine.

## 2020-09-28 NOTE — PROGRESS NOTE ADULT - SUBJECTIVE AND OBJECTIVE BOX
VITAL SIGNS    Telemetry:     sr   100    Vital Signs Last 24 Hrs  T(C): 36.5 (20 @ 05:40), Max: 36.7 (20 @ 20:18)  T(F): 97.7 (20 @ 05:40), Max: 98 (20 @ 20:18)  HR: 107 (20 @ 05:40) (99 - 107)  BP: 108/76 (20 @ 05:40) (102/71 - 108/76)  RR: 18 (20 @ 05:40) (18 - 18)  SpO2: 99% (20 @ 05:40) (99% - 100%)                   Daily     Daily Weight in k.4 (28 Sep 2020 07:53)      Bilirubin Direct, Serum: 0.2 mg/dL ( @ 10:25)  Bilirubin Total, Serum: 0.8 mg/dL ( @ 10:25)    CAPILLARY BLOOD GLUCOSE      POCT Blood Glucose.: 86 mg/dL (28 Sep 2020 07:50)  POCT Blood Glucose.: 116 mg/dL (27 Sep 2020 16:50)  POCT Blood Glucose.: 124 mg/dL (27 Sep 2020 11:23)                            PHYSICAL EXAM  s   No chest pain  No palpatations"  Neurology: alert and oriented x 3, moves all extremities with no defecits  CV :  RRR  Lungs:   CTA B/L  Abdomen: soft, nontender, nondistended, positive bowel sounds, last bowel movement      Extremities:     trace to plus one pedal edema

## 2020-09-28 NOTE — PROGRESS NOTE ADULT - PROBLEM SELECTOR PLAN 7
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

## 2020-09-28 NOTE — PROGRESS NOTE ADULT - PROBLEM SELECTOR PLAN 4
CMV viral load detectable at 256 on 9/17. Will repeat and determine need for treatment. Will discuss with ID.

## 2020-09-28 NOTE — PROGRESS NOTE ADULT - ASSESSMENT
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 s/p EGD with esophagitis and gastritis. Developed Klebsiella oxytoca bacteremia requiring transfer to CTU. Clinically improving, transferred to [facility], finished 10-day of ceftriaxone, however, developed severe C.diff colitis on Vancomycin 500mg q6h PO and IV Flagyl. He had RHC on 8/25 and found to have high filling pressure so transferred to CTU again.    #C.diff colitis with NORMAN- C.diff PCR detected (8/23). Repeat CT on 8/30 without evidence of toxic megacolon.   - clinically  has hit a plateau and without significant improvement but also without deterioration  tolerating minimal amounts of oral food  continue po vanco but dose to 125mg QID  through 9/24 followed by Vanco 125mg tid for one week followed by Vanco 125mg bid x 1 week through 9/30 then Vancomycin 125mg QD for a few weeks  continue Marinol to improve appetite  Asked stewardship program to investigate if Bezlotoxamab 10mg/kg as a single dose could be obtained to help reduce recurrence of infection in this immunocompromised patient-  trying to obtain for a dose next week prior to dc to rehab       #OI prophylaxis-  -Was previously receiving high dose steroids  -CMV viral load(8/17, 8/26): neg, needs repeat specimen  -Valcyte and Bactrim d/c'ed on 8/17 due to leukopenia  -Galactomann<0.5, Fungitell<31  -Steroids being tapered currently 10mg/day prednisone    # CMV viral load  9/17= 256 copies/ml  repeat CMV viral laod sent 9/28  possibly just a blip reflective of his immunosuppressed state/steroids  hold off on treatment decision until recent results return        Gary Lujan MD  [phone]  After 5pm/weekends [phone]

## 2020-09-28 NOTE — PROGRESS NOTE ADULT - PROBLEM SELECTOR PLAN 4
Patient with ileus in setting of C.Diff- illeus resolved with bowel rest  T  Vancomycin PO decreased to 125mg bid x 1 week then 125mg QD x 1week til 10/8  ID following

## 2020-09-28 NOTE — PROVIDER CONTACT NOTE (CRITICAL VALUE NOTIFICATION) - BACKGROUND
70 year old male current admission for klebsiella oxytoca bacteremia followed by sever comlicated C.diff colitis
See history

## 2020-09-28 NOTE — PROGRESS NOTE ADULT - PROBLEM SELECTOR PLAN 1
Followed by HF team  Labs 3x weekly as per HF   catracho phlebotomy today  Tacrolimus 5mg two times daily

## 2020-09-29 LAB
GLUCOSE BLDC GLUCOMTR-MCNC: 108 MG/DL — HIGH (ref 70–99)
GLUCOSE BLDC GLUCOMTR-MCNC: 148 MG/DL — HIGH (ref 70–99)
GLUCOSE BLDC GLUCOMTR-MCNC: 82 MG/DL — SIGNIFICANT CHANGE UP (ref 70–99)
GLUCOSE BLDC GLUCOMTR-MCNC: 88 MG/DL — SIGNIFICANT CHANGE UP (ref 70–99)

## 2020-09-29 PROCEDURE — 99232 SBSQ HOSP IP/OBS MODERATE 35: CPT | Mod: GC

## 2020-09-29 PROCEDURE — 99232 SBSQ HOSP IP/OBS MODERATE 35: CPT

## 2020-09-29 PROCEDURE — 99233 SBSQ HOSP IP/OBS HIGH 50: CPT

## 2020-09-29 RX ADMIN — HEPARIN SODIUM 5000 UNIT(S): 5000 INJECTION INTRAVENOUS; SUBCUTANEOUS at 21:28

## 2020-09-29 RX ADMIN — PANTOPRAZOLE SODIUM 40 MILLIGRAM(S): 20 TABLET, DELAYED RELEASE ORAL at 06:26

## 2020-09-29 RX ADMIN — SODIUM CHLORIDE 3 MILLILITER(S): 9 INJECTION INTRAMUSCULAR; INTRAVENOUS; SUBCUTANEOUS at 06:26

## 2020-09-29 RX ADMIN — HEPARIN SODIUM 5000 UNIT(S): 5000 INJECTION INTRAVENOUS; SUBCUTANEOUS at 15:11

## 2020-09-29 RX ADMIN — Medication 10 MILLIGRAM(S): at 08:03

## 2020-09-29 RX ADMIN — SODIUM CHLORIDE 3 MILLILITER(S): 9 INJECTION INTRAMUSCULAR; INTRAVENOUS; SUBCUTANEOUS at 15:22

## 2020-09-29 RX ADMIN — Medication 40 MILLIGRAM(S): at 06:25

## 2020-09-29 RX ADMIN — Medication 3 UNIT(S): at 17:04

## 2020-09-29 RX ADMIN — Medication 3 UNIT(S): at 08:03

## 2020-09-29 RX ADMIN — Medication 5 MILLIGRAM(S): at 06:25

## 2020-09-29 RX ADMIN — Medication 25 MILLIGRAM(S): at 06:25

## 2020-09-29 RX ADMIN — TACROLIMUS 5 MILLIGRAM(S): 5 CAPSULE ORAL at 23:02

## 2020-09-29 RX ADMIN — Medication 125 MILLIGRAM(S): at 06:26

## 2020-09-29 RX ADMIN — CHLORHEXIDINE GLUCONATE 1 APPLICATION(S): 213 SOLUTION TOPICAL at 12:02

## 2020-09-29 RX ADMIN — Medication 3 UNIT(S): at 12:06

## 2020-09-29 RX ADMIN — Medication 125 MILLIGRAM(S): at 17:06

## 2020-09-29 RX ADMIN — TACROLIMUS 5 MILLIGRAM(S): 5 CAPSULE ORAL at 11:13

## 2020-09-29 RX ADMIN — HEPARIN SODIUM 5000 UNIT(S): 5000 INJECTION INTRAVENOUS; SUBCUTANEOUS at 06:25

## 2020-09-29 RX ADMIN — Medication 5 MILLIGRAM(S): at 15:10

## 2020-09-29 NOTE — PROGRESS NOTE ADULT - PROBLEM SELECTOR PLAN 2
- Continue tacrolimus, goal will be ~10 as monotherapy (previously on everolimus); c/w 5/5   - Continue prednisone 10 mg daily, wean per heme  - Will resume statin and aspirin 81 mg daily.

## 2020-09-29 NOTE — PROGRESS NOTE ADULT - SUBJECTIVE AND OBJECTIVE BOX
YVONNEBILLY NGUYEN  MRN-[id number]    Interval hx:      Subjective:    Review of System (ROS)  Constitutional:  No Weight Change, No Fever, No Chills, No Night Sweats, No Fatigue, No Malaise  ENT/Mouth:  No Hearing Changes, No Ear Pain, No Nasal Congestion, No Sinus Pain, No Hoarseness, No sore throat, No Rhinorrhea, No Swallowing Difficulty  Eyes:  No Eye Pain, No Swelling, No Redness, No Foreign Body, No Discharge, No Vision Changes  Cardiovascular:  No Chest Pain, No SOB, No PND, No Dyspnea on Exertion, No Orthopnea, No Claudication, No Edema, No Palpitations  Respiratory:  No Cough, No Sputum, No Wheezing, No Smoke Exposure, No Dyspnea  Gastrointestinal:  No Nausea, No Vomiting, No Diarrhea, No Constipation, No Pain, No Heartburn, No Anorexia, No Dysphagia, No Hematochezia, No Melena, No Flatulence, No Jaundice  Genitourinary:  No Dysmenorrhea, No DUB, No Dyspareunia, No Dysuria, No Urinary Frequency, No Hematuria, No Urinary Incontinence, No Urgency, No Flank Pain, No Urinary Flow Changes, No Hesitancy  Musculoskeletal:  No Arthralgias, No Myalgias, No Joint Swelling, No Joint Stiffness, No Back Pain, No Neck Pain, No Injury History  Skin:  No Skin Lesions, No Pruritis, No Hair Changes, No Breast/Skin Changes, No Nipple Discharge  Neuro:  No Weakness, No Numbness, No Paresthesias, No Loss of Consciousness, No Syncope, No Dizziness, No Headache, No Coordination Changes, No Recent Falls  Psych:  No Anxiety/Panic, No Depression, No Insomnia, No Personality Changes, No Delusions, No Rumination, No SI/HI/AH/VH, No Social Issues, No Memory Changes, No Violence/Abuse Hx., No Eating Concerns  Heme/Lymph:  No Bruising, No Bleeding, No Transfusions History, No Lymphadenopathy  Endocrine:  No Polyuria, No Polydipsia, No Temperature Intolerance    MEDICATIONS  (STANDING):  chlorhexidine 2% Cloths 1 Application(s) Topical <User Schedule>  dextrose 5%. 1000 milliLiter(s) (50 mL/Hr) IV Continuous <Continuous>  dextrose 50% Injectable 25 Gram(s) IV Push once  dextrose 50% Injectable 12.5 Gram(s) IV Push once  dextrose 50% Injectable 25 Gram(s) IV Push once  dronabinol 5 milliGRAM(s) Oral <User Schedule>  furosemide    Tablet 40 milliGRAM(s) Oral daily  heparin   Injectable 5000 Unit(s) SubCutaneous every 8 hours  hydrALAZINE 25 milliGRAM(s) Oral every 8 hours  insulin lispro (HumaLOG) corrective regimen sliding scale   SubCutaneous three times a day before meals  insulin lispro (HumaLOG) corrective regimen sliding scale   SubCutaneous at bedtime  insulin lispro Injectable (HumaLOG) 3 Unit(s) SubCutaneous three times a day before meals  pantoprazole    Tablet 40 milliGRAM(s) Oral before breakfast  predniSONE   Tablet 10 milliGRAM(s) Oral <User Schedule>  sodium chloride 0.9% lock flush 3 milliLiter(s) IV Push every 8 hours  tacrolimus 5 milliGRAM(s) Oral <User Schedule>  vancomycin    Solution 125 milliGRAM(s) Oral every 12 hours    MEDICATIONS  (PRN):  dextrose 40% Gel 15 Gram(s) Oral once PRN Blood Glucose LESS THAN 70 milliGRAM(s)/deciliter  glucagon  Injectable 1 milliGRAM(s) IntraMuscular once PRN Glucose LESS THAN 70 milligrams/deciliter  sodium chloride 0.9% lock flush 10 milliLiter(s) IV Push every 1 hour PRN Pre/post blood products, medications, blood draw, and to maintain line patency      Allergies    No Known Allergies    Intolerances        Objectives:  Vital Signs Last 24 Hrs  T(C): 37 (29 Sep 2020 06:00), Max: 37 (29 Sep 2020 06:00)  T(F): 98.6 (29 Sep 2020 06:00), Max: 98.6 (29 Sep 2020 06:00)  HR: 118 (29 Sep 2020 06:00) (99 - 121)  BP: 120/86 (29 Sep 2020 06:00) (95/72 - 120/86)  BP(mean): 85 (28 Sep 2020 20:45) (85 - 85)  RR: 18 (29 Sep 2020 06:00) (18 - 18)  SpO2: 100% (29 Sep 2020 06:00) (96% - 100%)    Physical exam  General - NAD, alert and oriented  HEENT - PERRLA, EOM intact, sclera and conjunctiva clear, oropharynx, nares clear  Neck - Supple, no thyromegaly or thyroid nodules, no bruits  Cardiovascular - RRR no m/r/g, no JVD, no carotid bruits  Lungs - CTAB, no use of acessory muscles, no w/c/r  Abdomen - Normal bowel sounds, NT/ND  Genito Urinary –   Lymph Nodes - No LAD  Extremeties - No e/c/c  Skin - No rashes, skin warm and dry, no erythematous areas  Musculo Skeletal - 5/5 strength, normal range of motion, no swollen or erythematous joints.  Neurological – Alert and oriented x 3, CN 2-12 grossly intact.        09-28-20 @ 07:01  -  09-29-20 @ 07:00  --------------------------------------------------------  IN: 730 mL / OUT: 1250 mL / NET: -520 mL        Labs:    CBC Full  -  ( 28 Sep 2020 11:23 )  WBC Count : 5.98 K/uL  RBC Count : 3.28 M/uL  Hemoglobin : 10.5 g/dL  Hematocrit : 35.5 %  Platelet Count - Automated : 324 K/uL  Mean Cell Volume : 108.2 fl  Mean Cell Hemoglobin : 32.0 pg  Mean Cell Hemoglobin Concentration : 29.6 gm/dL  Auto Neutrophil # : x  Auto Lymphocyte # : x  Auto Monocyte # : x  Auto Eosinophil # : x  Auto Basophil # : x  Auto Neutrophil % : x  Auto Lymphocyte % : x  Auto Monocyte % : x  Auto Eosinophil % : x  Auto Basophil % : x        09-28    136  |  100  |  30<H>  ----------------------------<  101<H>  5.2   |  25  |  1.48<H>    Ca    8.8      28 Sep 2020 11:23  Phos  4.8     09-28  Mg     2.1     09-28                  Imagings:       BILLY RUSSELL  [id number]    Subjective:  Patient states that he feels better. Denies dizziness, SOB, fatigue, or others.      MEDICATIONS  (STANDING):  chlorhexidine 2% Cloths 1 Application(s) Topical <User Schedule>  dextrose 5%. 1000 milliLiter(s) (50 mL/Hr) IV Continuous <Continuous>  dextrose 50% Injectable 25 Gram(s) IV Push once  dextrose 50% Injectable 12.5 Gram(s) IV Push once  dextrose 50% Injectable 25 Gram(s) IV Push once  dronabinol 5 milliGRAM(s) Oral <User Schedule>  furosemide    Tablet 40 milliGRAM(s) Oral daily  heparin   Injectable 5000 Unit(s) SubCutaneous every 8 hours  hydrALAZINE 25 milliGRAM(s) Oral every 8 hours  insulin lispro (HumaLOG) corrective regimen sliding scale   SubCutaneous three times a day before meals  insulin lispro (HumaLOG) corrective regimen sliding scale   SubCutaneous at bedtime  insulin lispro Injectable (HumaLOG) 3 Unit(s) SubCutaneous three times a day before meals  pantoprazole    Tablet 40 milliGRAM(s) Oral before breakfast  predniSONE   Tablet 10 milliGRAM(s) Oral <User Schedule>  sodium chloride 0.9% lock flush 3 milliLiter(s) IV Push every 8 hours  tacrolimus 5 milliGRAM(s) Oral <User Schedule>  vancomycin    Solution 125 milliGRAM(s) Oral every 12 hours    MEDICATIONS  (PRN):  dextrose 40% Gel 15 Gram(s) Oral once PRN Blood Glucose LESS THAN 70 milliGRAM(s)/deciliter  glucagon  Injectable 1 milliGRAM(s) IntraMuscular once PRN Glucose LESS THAN 70 milligrams/deciliter  sodium chloride 0.9% lock flush 10 milliLiter(s) IV Push every 1 hour PRN Pre/post blood products, medications, blood draw, and to maintain line patency      Allergies    No Known Allergies    Intolerances        Objectives:  Vital Signs Last 24 Hrs  T(C): 37 (29 Sep 2020 06:00), Max: 37 (29 Sep 2020 06:00)  T(F): 98.6 (29 Sep 2020 06:00), Max: 98.6 (29 Sep 2020 06:00)  HR: 118 (29 Sep 2020 06:00) (99 - 121)  BP: 120/86 (29 Sep 2020 06:00) (95/72 - 120/86)  BP(mean): 85 (28 Sep 2020 20:45) (85 - 85)  RR: 18 (29 Sep 2020 06:00) (18 - 18)  SpO2: 100% (29 Sep 2020 06:00) (96% - 100%)    Physical exam  General - NAD, alert and oriented  HEENT - PERRLA, EOM intact, sclera and conjunctiva clear, oropharynx, nares clear  Neck - Supple  Cardiovascular - RRR   Lungs - CTAB  Abdomen - Normal bowel sounds, NT/ND  Lymph Nodes - No LAD  Musculo Skeletal - 5/5 strength, normal range of motion, no swollen or erythematous joints.          09-28-20 @ 07:01  -  09-29-20 @ 07:00  --------------------------------------------------------  IN: 730 mL / OUT: 1250 mL / NET: -520 mL        Labs:    CBC Full  -  ( 28 Sep 2020 11:23 )  WBC Count : 5.98 K/uL  RBC Count : 3.28 M/uL  Hemoglobin : 10.5 g/dL  Hematocrit : 35.5 %  Platelet Count - Automated : 324 K/uL  Mean Cell Volume : 108.2 fl  Mean Cell Hemoglobin : 32.0 pg  Mean Cell Hemoglobin Concentration : 29.6 gm/dL  Auto Neutrophil # : x  Auto Lymphocyte # : x  Auto Monocyte # : x  Auto Eosinophil # : x  Auto Basophil # : x  Auto Neutrophil % : x  Auto Lymphocyte % : x  Auto Monocyte % : x  Auto Eosinophil % : x  Auto Basophil % : x        09-28    136  |  100  |  30<H>  ----------------------------<  101<H>  5.2   |  25  |  1.48<H>    Ca    8.8      28 Sep 2020 11:23  Phos  4.8     09-28  Mg     2.1     09-28                  Imagings:

## 2020-09-29 NOTE — PROGRESS NOTE ADULT - ATTENDING COMMENTS
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 Noted to have warm antobody hemolysis. Improved with prednisone. Hemoglobin stable, prednisone at 10 mg daily. Taper as per transplant.

## 2020-09-29 NOTE — PROGRESS NOTE ADULT - SUBJECTIVE AND OBJECTIVE BOX
Subjective: No current complaints. He feels well.     Medications:  chlorhexidine 2% Cloths 1 Application(s) Topical <User Schedule>  dextrose 40% Gel 15 Gram(s) Oral once PRN  dextrose 5%. 1000 milliLiter(s) IV Continuous <Continuous>  dextrose 50% Injectable 12.5 Gram(s) IV Push once  dextrose 50% Injectable 25 Gram(s) IV Push once  dextrose 50% Injectable 25 Gram(s) IV Push once  dronabinol 5 milliGRAM(s) Oral <User Schedule>  furosemide    Tablet 40 milliGRAM(s) Oral daily  glucagon  Injectable 1 milliGRAM(s) IntraMuscular once PRN  heparin   Injectable 5000 Unit(s) SubCutaneous every 8 hours  insulin lispro (HumaLOG) corrective regimen sliding scale   SubCutaneous three times a day before meals  insulin lispro (HumaLOG) corrective regimen sliding scale   SubCutaneous at bedtime  insulin lispro Injectable (HumaLOG) 3 Unit(s) SubCutaneous three times a day before meals  pantoprazole    Tablet 40 milliGRAM(s) Oral before breakfast  predniSONE   Tablet 10 milliGRAM(s) Oral <User Schedule>  sodium chloride 0.9% lock flush 10 milliLiter(s) IV Push every 1 hour PRN  sodium chloride 0.9% lock flush 3 milliLiter(s) IV Push every 8 hours  tacrolimus 5 milliGRAM(s) Oral <User Schedule>  vancomycin    Solution 125 milliGRAM(s) Oral every 12 hours      Physical Exam:    Vital Signs Last 24 Hrs  T(C): 37 (29 Sep 2020 06:00), Max: 37 (29 Sep 2020 06:00)  T(F): 98.6 (29 Sep 2020 06:00), Max: 98.6 (29 Sep 2020 06:00)  HR: 118 (29 Sep 2020 06:00) (99 - 118)  BP: 120/86 (29 Sep 2020 06:00) (95/72 - 120/86)  BP(mean): 85 (28 Sep 2020 20:45) (85 - 85)  RR: 18 (29 Sep 2020 06:00) (18 - 18)  SpO2: 100% (29 Sep 2020 06:00) (96% - 100%)    Daily     Daily Weight in k.6 (29 Sep 2020 08:31)    I&O's Summary    28 Sep 2020 07:01  -  29 Sep 2020 07:00  --------------------------------------------------------  IN: 730 mL / OUT: 1250 mL / NET: -520 mL    General: No distress. Comfortable.  HEENT: EOM intact.  Neck: Neck supple. JVP not elevated. No masses  Chest: Clear to auscultation bilaterally  CV: Normal S1 and S2. No rubs or gallops. Radial pulses normal. No significant edema.   Abdomen: Soft, non-distended, non-tender  Skin: No rashes or skin breakdown  Neurology: Alert and oriented times three. Sensation intact  Psych: Affect normal    Labs:                        10.5   5.98  )-----------( 324      ( 28 Sep 2020 11:23 )             35.5         136  |  100  |  30<H>  ----------------------------<  101<H>  5.2   |  25  |  1.48<H>    Ca    8.8      28 Sep 2020 11:23  Phos  4.8       Mg     2.1         Lactate Dehydrogenase, Serum: 382 U/L ( @ 10:2

## 2020-09-29 NOTE — PROGRESS NOTE ADULT - SUBJECTIVE AND OBJECTIVE BOX
INFECTIOUS DISEASES FOLLOW UP-- Suejy Lujan  [phone]    This is a follow up note for this  70yMale with  Anemia  C.diff colitis- severe but almost resolved at this point  appetite improving        ROS:  CONSTITUTIONAL:  No fever, good appetite  CARDIOVASCULAR:  No chest pain or palpitations  RESPIRATORY:  No dyspnea  GASTROINTESTINAL:  No nausea, vomiting, diarrhea, or abdominal pain- stool not yet formed  GENITOURINARY:  No dysuria  NEUROLOGIC:  No headache,     Allergies    No Known Allergies    Intolerances        ANTIBIOTICS/RELEVANT:  antimicrobials  vancomycin    Solution 125 milliGRAM(s) Oral every 12 hours    immunologic:  tacrolimus 5 milliGRAM(s) Oral <User Schedule>    OTHER:  chlorhexidine 2% Cloths 1 Application(s) Topical <User Schedule>  dextrose 40% Gel 15 Gram(s) Oral once PRN  dextrose 5%. 1000 milliLiter(s) IV Continuous <Continuous>  dextrose 50% Injectable 12.5 Gram(s) IV Push once  dextrose 50% Injectable 25 Gram(s) IV Push once  dextrose 50% Injectable 25 Gram(s) IV Push once  dronabinol 5 milliGRAM(s) Oral <User Schedule>  furosemide    Tablet 40 milliGRAM(s) Oral daily  glucagon  Injectable 1 milliGRAM(s) IntraMuscular once PRN  heparin   Injectable 5000 Unit(s) SubCutaneous every 8 hours  insulin lispro (HumaLOG) corrective regimen sliding scale   SubCutaneous three times a day before meals  insulin lispro (HumaLOG) corrective regimen sliding scale   SubCutaneous at bedtime  insulin lispro Injectable (HumaLOG) 3 Unit(s) SubCutaneous three times a day before meals  pantoprazole    Tablet 40 milliGRAM(s) Oral before breakfast  predniSONE   Tablet 10 milliGRAM(s) Oral <User Schedule>  sodium chloride 0.9% lock flush 10 milliLiter(s) IV Push every 1 hour PRN  sodium chloride 0.9% lock flush 3 milliLiter(s) IV Push every 8 hours      Objective:  Vital Signs Last 24 Hrs  T(C): 36.5 (29 Sep 2020 14:16), Max: 37 (29 Sep 2020 06:00)  T(F): 97.7 (29 Sep 2020 14:16), Max: 98.6 (29 Sep 2020 06:00)  HR: 118 (29 Sep 2020 14:16) (99 - 129)  BP: 98/68 (29 Sep 2020 14:16) (98/68 - 120/86)  BP(mean): 85 (28 Sep 2020 20:45) (85 - 85)  RR: 18 (29 Sep 2020 12:00) (18 - 18)  SpO2: 97% (29 Sep 2020 12:00) (96% - 100%)    PHYSICAL EXAM:  Constitutional:no acute distress  Eyes:WALT, EOMI  Ear/Nose/Throat: no oral lesions, 	  Respiratory: clear BL  Cardiovascular: S1S2  Gastrointestinal:soft, (+) BS, no tenderness  Extremities:no e/e/c  No Lymphadenopathy  IV sites not inflammed.    LABS:                        10.5   5.98  )-----------( 324      ( 28 Sep 2020 11:23 )             35.5     09-28    136  |  100  |  30<H>  ----------------------------<  101<H>  5.2   |  25  |  1.48<H>    Ca    8.8      28 Sep 2020 11:23  Phos  4.8     09-28  Mg     2.1     09-28            MICROBIOLOGY:            RECENT CULTURES:      RADIOLOGY & ADDITIONAL STUDIES:    < from: Xray Chest 1 View- PORTABLE-Routine (Xray Chest 1 View- PORTABLE-Routine in AM.) (09.28.20 @ 09:15) >    IMPRESSION:    Right basilar linear atelectasis.    < end of copied text >

## 2020-09-29 NOTE — PROVIDER CONTACT NOTE (OTHER) - ACTION/TREATMENT ORDERED:
NP notified and aware.
as per NP oliverio Pelaez okay to leave pt without IVL
NP notified and aware.

## 2020-09-29 NOTE — PROGRESS NOTE ADULT - ASSESSMENT
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 Patient was treated w/ prednisone and Rituximab. Tacro was discontinued and patient was also transitioned to everolimus  Admitted  6/16-6/19  for hyperglycemia.  Reported to transplant team having one done black tarry stool-  instructed to  present to [facility] for hemolytic anemia. Patient has been following with Hematology outpatient.  Denies CP  N/V diarrhea SOB. (11 Aug 2020 20:08)  /11 Admitted to [facility] with symptomatic anemia  8/13 EGD for investigation of tarry stools  8/15 SB capsule: stomach & SB lesions, likely ulcers.  8/17 EGD/push enteroscopy w/ angioectasias/erosions in small bowel. Gastropathy and esophagitis. Ulcer seen on VCE most likely scope trauma.    8/18 VSS transferred back to floor.   8/20 VS 9.0/28.4 stable Gastric biopsy results LA Grade A esophagitis.  - Acute gastritis. Biopsies taken to r/o CMV, No ulceration seen despite finding on capsule endoscopy - likely 2/2 scope  trauma that has since resolved. Pathology pending.  8/21 VSS H/H  8.1/25.7  trending down will monitor prednisone taper 30 mg today. Procardia 120 initiated today RHC biopsy Monday.   8/22 VVS; Patient reports loose stools x 2-3 days with 1 episode today.  Stool sent for C-dif and GI PCR. Abdominal X-ray revealed: dilated loops of bowel. Abdomen distended.  Patient denies N/V. GI called to re-evaluate. Continue with current medication regimen.  Awaiting for upcoming cardiac biopsy on Monday 8/24.  8/23 VVS; creat up to 2.28, repeating CMP, TTE ordered Bladder scan   8/24 cardiac bx cancelled. elev creat  to 2.74   cardio renal cslt called, + CDIF  increase vanco to 500 q6 ID following  8/25 VSS: RHC today as per transplant team; transfuse 2 units PRBC today as per Dr. Viramontes; continue vanco for c-diff; transfer patient to CTU after cath today   8/26. Dieretic challenge with good response   8/27: Downgraded to the floor then upgraded to the CTU again for concerning of abd distention   8/28 CT ABD & Pelvis reveals pancolitis  8/30: repeat CT scan of abd, gen surgery called to re-evaluated pt.  8/31: RUE duplex negative for DVT  9/4 NGT clamped, minimal residual. NGT removed. started sips   9/6 tolerating clear liquid diet  9/8 Bronch  9/9 1 prbc   9/10 increased tube feeds to 20cc/hr and tpn decreased to 63cc/hr from 125cc/hr   9/12 TPN discontinued  9/14 Diet advanced, tube feeds at 75% goal, central line removed and midline inserted.  9/14 pt completed approp 75 % of meal.   pt ambulated to the door this afternoon  9/15 Pt tolerating PO diet with supplemental tube feeds. calorie count iniated  9/16 VSS OOB in chair no acute distress transferred to [address] [place] [address] nocturnal tube feeds 80 cc/hrx12 - RUE + edema Tacro level 7.2  PT disposition  to rehab.  9/177 VSS flat affect dronabinol added appetite stimulant at goal with Glucerna nocturnal feeds.. Rosas removed this am  9/18 VSS Remains on Glucerna TF, failed calorie count, Glucerna increased to 3 daily. Loose BM Improving--continue on Vanco 125mg QID taper as per ID.   9/19 VVS; RUE edema --> Vascular surgery consult called per CHF for prior occlusion of right subclavian.  Right Midline D/C'd . No evidence of DVT. CT Venogram of RUE to evaluate patency of subclavian  9/20 HD stable, cont PO vanco. Tacro level 9.4.   9/21    OOB to chait   VSS  Loose BM  this am   on vanco  9/22 VSS small oral intake with encouragement  this am. Calorie count in place  9/23 VSS appetite increasing oral intake improving per ID Vancomycin to be decreased to BID  in am.  9/24 HD stable.  Improving appetite.  Vanco decreased to bid x 1 week then 125mg QD x 1 week.  Calorie count in progress.  9/25 c/w present management  9/26 VSS Lab holiday labs- Sunday-  RUE improving . Oral dietary intake  and appetite improving  9/27 VSS Low potassium diet nocturnal feeds d/c  on calorie count- labs this am  9/28   dc keofeed    addy count ,    vss  9/29 VSS - as per dR. Stahl - hydralazine d/c'd - rehab Wednesday

## 2020-09-29 NOTE — PROGRESS NOTE ADULT - ASSESSMENT
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 Possible that heart transplant and use of cyclosporine during this admission worsened it. Infection can also worsen hemolysis  -H/H has been stable  -can taper prednisone to 5mg daily if ok from heart transplant team  -continue folic acid supplementation    #Cdiff  #Ileus  -Vanc PO per LISA Del Toro-in MD Parminder, PGY-4  Translational Medical Oncology Fellow  Pager: [phone]  Case to be d/w Dr. 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 Goldberg

## 2020-09-29 NOTE — PROGRESS NOTE ADULT - PROBLEM SELECTOR PLAN 1
Followed by HF team  Labs 3x weekly as per HF   catracho phlebotomy today  Tacrolimus 5mg two times daily  9/29 hydralazine d/c'd

## 2020-09-29 NOTE — PROGRESS NOTE ADULT - SUBJECTIVE AND OBJECTIVE BOX
VITAL SIGNS-Telemetry:    Vital Signs Last 24 Hrs  T(C): 37 (20 @ 06:00), Max: 37 (20 @ 06:00)  T(F): 98.6 (20 @ 06:00), Max: 98.6 (20 @ 06:00)  HR: 118 (20 @ 06:00) (99 - 121)  BP: 120/86 (20 @ 06:00) (95/72 - 120/86)  RR: 18 (20 @ 06:00) (18 - 18)  SpO2: 100% (20 @ 06:00) (96% - 100%)          @ 07:01  -   @ 07:00  --------------------------------------------------------  IN: 730 mL / OUT: 1250 mL / NET: -520 mL        Daily     Daily Weight in k.6 (29 Sep 2020 08:31)    CAPILLARY BLOOD GLUCOSE      POCT Blood Glucose.: 88 mg/dL (29 Sep 2020 07:54)  POCT Blood Glucose.: 91 mg/dL (28 Sep 2020 22:30)  POCT Blood Glucose.: 75 mg/dL (28 Sep 2020 21:35)  POCT Blood Glucose.: 113 mg/dL (28 Sep 2020 17:13)  POCT Blood Glucose.: 107 mg/dL (28 Sep 2020 11:35)          Drains:     MS         [  ] Drainage:                 L Pleural  [  ]  Drainage:                R Pleural  [  ]  Drainage:  Pacing Wires        [  ]   Settings:                                  Isolated  [  ]  Coumadin    [ ] YES          [  ]      NO         Reason:       PHYSICAL EXAM:  Neurology: alert and oriented x 3, nonfocal, no gross deficits  CV :  Sternal Wound :  CDI , Stable  Lungs:  Abdomen: soft, nontender, nondistended, positive bowel sounds, last bowel movement         Extremities:         chlorhexidine 2% Cloths 1 Application(s) Topical <User Schedule>  dextrose 40% Gel 15 Gram(s) Oral once PRN  dextrose 5%. 1000 milliLiter(s) IV Continuous <Continuous>  dextrose 50% Injectable 25 Gram(s) IV Push once  dextrose 50% Injectable 12.5 Gram(s) IV Push once  dextrose 50% Injectable 25 Gram(s) IV Push once  dronabinol 5 milliGRAM(s) Oral <User Schedule>  furosemide    Tablet 40 milliGRAM(s) Oral daily  glucagon  Injectable 1 milliGRAM(s) IntraMuscular once PRN  heparin   Injectable 5000 Unit(s) SubCutaneous every 8 hours  insulin lispro (HumaLOG) corrective regimen sliding scale   SubCutaneous three times a day before meals  insulin lispro (HumaLOG) corrective regimen sliding scale   SubCutaneous at bedtime  insulin lispro Injectable (HumaLOG) 3 Unit(s) SubCutaneous three times a day before meals  pantoprazole    Tablet 40 milliGRAM(s) Oral before breakfast  predniSONE   Tablet 10 milliGRAM(s) Oral <User Schedule>  sodium chloride 0.9% lock flush 10 milliLiter(s) IV Push every 1 hour PRN  sodium chloride 0.9% lock flush 3 milliLiter(s) IV Push every 8 hours  tacrolimus 5 milliGRAM(s) Oral <User Schedule>  vancomycin    Solution 125 milliGRAM(s) Oral every 12 hours      Physical Therapy Rec:   Home  [  ]   Home w/ PT  [  ]  Rehab  [  ]  Discussed with Cardiothoracic Team at AM rounds. VITAL SIGNS-Telemetry: ST 1st 100   Vital Signs Last 24 Hrs  T(C): 37 (20 @ 06:00), Max: 37 (20 @ 06:00)  T(F): 98.6 (20 @ 06:00), Max: 98.6 (20 @ 06:00)  HR: 118 (20 @ 06:00) (99 - 121)  BP: 120/86 (20 @ 06:00) (95/72 - 120/86)  RR: 18 (20 @ 06:00) (18 - 18)  SpO2: 100% (20 @ 06:00) (96% - 100%)          @ 07:01  -   @ 07:00  --------------------------------------------------------  IN: 730 mL / OUT: 1250 mL / NET: -520 mL    Daily     Daily Weight in k.6 (29 Sep 2020 08:31)    CAPILLARY BLOOD GLUCOSE  POCT Blood Glucose.: 88 mg/dL (29 Sep 2020 07:54)  POCT Blood Glucose.: 91 mg/dL (28 Sep 2020 22:30)  POCT Blood Glucose.: 75 mg/dL (28 Sep 2020 21:35)  POCT Blood Glucose.: 113 mg/dL (28 Sep 2020 17:13)         PHYSICAL EXAM:  Neurology: alert and oriented x 3, nonfocal, no gross deficits  CV : S1S2  Sternal Wound :  healed  Lungs: CTA  Abdomen: soft, nontender, nondistended, positive bowel sounds, last bowel movement         Extremities:     no edema no calf tenderness    chlorhexidine 2% Cloths 1 Application(s) Topical <User Schedule>  dextrose 40% Gel 15 Gram(s) Oral once PRN  dextrose 5%. 1000 milliLiter(s) IV Continuous <Continuous>  dextrose 50% Injectable 25 Gram(s) IV Push once  dextrose 50% Injectable 12.5 Gram(s) IV Push once  dextrose 50% Injectable 25 Gram(s) IV Push once  dronabinol 5 milliGRAM(s) Oral <User Schedule>  furosemide    Tablet 40 milliGRAM(s) Oral daily  glucagon  Injectable 1 milliGRAM(s) IntraMuscular once PRN  heparin   Injectable 5000 Unit(s) SubCutaneous every 8 hours  insulin lispro (HumaLOG) corrective regimen sliding scale   SubCutaneous three times a day before meals  insulin lispro (HumaLOG) corrective regimen sliding scale   SubCutaneous at bedtime  insulin lispro Injectable (HumaLOG) 3 Unit(s) SubCutaneous three times a day before meals  pantoprazole    Tablet 40 milliGRAM(s) Oral before breakfast  predniSONE   Tablet 10 milliGRAM(s) Oral <User Schedule>  sodium chloride 0.9% lock flush 10 milliLiter(s) IV Push every 1 hour PRN  sodium chloride 0.9% lock flush 3 milliLiter(s) IV Push every 8 hours  tacrolimus 5 milliGRAM(s) Oral <User Schedule>  vancomycin    Solution 125 milliGRAM(s) Oral every 12 hours    Physical Therapy Rec:   Home  [  ]   Home w/ PT  [  ]  Rehab  [ x ]  Discussed with Cardiothoracic Team at AM rounds.

## 2020-09-29 NOTE — PROGRESS NOTE ADULT - ASSESSMENT
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 s/p EGD with esophagitis and gastritis. Developed Klebsiella oxytoca bacteremia requiring transfer to CTU. Clinically improving, transferred to [facility], finished 10-day of ceftriaxone, however, developed severe C.diff colitis on Vancomycin 500mg q6h PO and IV Flagyl. He had RHC on 8/25 and found to have high filling pressure so transferred to CTU again.    #C.diff colitis with NORMAN- C.diff PCR detected (8/23). Repeat CT on 8/30 without evidence of toxic megacolon.   - clinically  has hit a plateau and without significant improvement but also without deterioration  tolerating minimal amounts of oral food  continue po vanco but dose to 125mg QID  through 9/24 followed by Vanco 125mg tid for one week followed by Vanco 125mg bid x 1 week through 9/30 then Vancomycin 125mg QD for a few weeks  continue Marinol to improve appetite  Decision made not to pursue Bezlotoxumab based upon limited data and possible cardiac side effects       #OI prophylaxis-  -Was previously receiving high dose steroids  -CMV viral load(8/17, 8/26): neg,   -Valcyte and Bactrim d/c'ed on 8/17 due to leukopenia  -Galactomann<0.5, Fungitell<31  -Steroids being tapered currently 10mg/day prednisone    # CMV viral load  9/17= 256 copies/ml  repeat CMV viral laod sent 9/28 and pending result  possibly just a blip reflective of his immunosuppressed state/steroids  hold off on treatment decision until recent results return    Current plan to discharge home toward the end of the week if able to ambulate in hallway    Gary Lujan MD  [phone]  After 5pm/weekends [phone]

## 2020-09-30 ENCOUNTER — APPOINTMENT (OUTPATIENT)
Dept: HEMATOLOGY ONCOLOGY | Facility: CLINIC | Age: 70
End: 2020-09-30

## 2020-09-30 DIAGNOSIS — R63.0 ANOREXIA: ICD-10-CM

## 2020-09-30 LAB
ANION GAP SERPL CALC-SCNC: 11 MMOL/L — SIGNIFICANT CHANGE UP (ref 5–17)
BUN SERPL-MCNC: 28 MG/DL — HIGH (ref 7–23)
CALCIUM SERPL-MCNC: 9.2 MG/DL — SIGNIFICANT CHANGE UP (ref 8.4–10.5)
CHLORIDE SERPL-SCNC: 104 MMOL/L — SIGNIFICANT CHANGE UP (ref 96–108)
CO2 SERPL-SCNC: 24 MMOL/L — SIGNIFICANT CHANGE UP (ref 22–31)
CREAT SERPL-MCNC: 1.41 MG/DL — HIGH (ref 0.5–1.3)
GLUCOSE BLDC GLUCOMTR-MCNC: 100 MG/DL — HIGH (ref 70–99)
GLUCOSE BLDC GLUCOMTR-MCNC: 100 MG/DL — HIGH (ref 70–99)
GLUCOSE BLDC GLUCOMTR-MCNC: 109 MG/DL — HIGH (ref 70–99)
GLUCOSE BLDC GLUCOMTR-MCNC: 89 MG/DL — SIGNIFICANT CHANGE UP (ref 70–99)
GLUCOSE SERPL-MCNC: 93 MG/DL — SIGNIFICANT CHANGE UP (ref 70–99)
HCT VFR BLD CALC: 36.3 % — LOW (ref 39–50)
HGB BLD-MCNC: 10.4 G/DL — LOW (ref 13–17)
MAGNESIUM SERPL-MCNC: 2.1 MG/DL — SIGNIFICANT CHANGE UP (ref 1.6–2.6)
MCHC RBC-ENTMCNC: 28.7 GM/DL — LOW (ref 32–36)
MCHC RBC-ENTMCNC: 30.7 PG — SIGNIFICANT CHANGE UP (ref 27–34)
MCV RBC AUTO: 107.1 FL — HIGH (ref 80–100)
NRBC # BLD: 0 /100 WBCS — SIGNIFICANT CHANGE UP (ref 0–0)
PLATELET # BLD AUTO: 327 K/UL — SIGNIFICANT CHANGE UP (ref 150–400)
POTASSIUM SERPL-MCNC: 5.2 MMOL/L — SIGNIFICANT CHANGE UP (ref 3.5–5.3)
POTASSIUM SERPL-SCNC: 5.2 MMOL/L — SIGNIFICANT CHANGE UP (ref 3.5–5.3)
RBC # BLD: 3.39 M/UL — LOW (ref 4.2–5.8)
RBC # FLD: 21.4 % — HIGH (ref 10.3–14.5)
SARS-COV-2 RNA SPEC QL NAA+PROBE: SIGNIFICANT CHANGE UP
SODIUM SERPL-SCNC: 139 MMOL/L — SIGNIFICANT CHANGE UP (ref 135–145)
TACROLIMUS SERPL-MCNC: 8.3 NG/ML — SIGNIFICANT CHANGE UP
TACROLIMUS SERPL-MCNC: 8.8 NG/ML — SIGNIFICANT CHANGE UP
WBC # BLD: 4.06 K/UL — SIGNIFICANT CHANGE UP (ref 3.8–10.5)
WBC # FLD AUTO: 4.06 K/UL — SIGNIFICANT CHANGE UP (ref 3.8–10.5)

## 2020-09-30 PROCEDURE — 99233 SBSQ HOSP IP/OBS HIGH 50: CPT

## 2020-09-30 PROCEDURE — 99232 SBSQ HOSP IP/OBS MODERATE 35: CPT

## 2020-09-30 RX ORDER — EVEROLIMUS 0.75 MG/1
0.75 TABLET ORAL
Qty: 60 | Refills: 5 | Status: DISCONTINUED | COMMUNITY
Start: 2020-05-27 | End: 2020-09-30

## 2020-09-30 RX ORDER — ATOVAQUONE 750 MG/5ML
750 SUSPENSION ORAL DAILY
Qty: 420 | Refills: 3 | Status: DISCONTINUED | COMMUNITY
Start: 2020-08-21 | End: 2020-09-30

## 2020-09-30 RX ORDER — POSACONAZOLE 100 MG/1
100 TABLET, DELAYED RELEASE ORAL AT BEDTIME
Qty: 90 | Refills: 5 | Status: DISCONTINUED | COMMUNITY
Start: 2020-06-01 | End: 2020-09-30

## 2020-09-30 RX ORDER — NIFEDIPINE 60 MG/1
60 TABLET, FILM COATED, EXTENDED RELEASE ORAL DAILY
Qty: 60 | Refills: 5 | Status: DISCONTINUED | COMMUNITY
Start: 2020-08-21 | End: 2020-09-30

## 2020-09-30 RX ADMIN — SODIUM CHLORIDE 3 MILLILITER(S): 9 INJECTION INTRAMUSCULAR; INTRAVENOUS; SUBCUTANEOUS at 13:09

## 2020-09-30 RX ADMIN — Medication 125 MILLIGRAM(S): at 05:19

## 2020-09-30 RX ADMIN — Medication 5 MILLIGRAM(S): at 13:09

## 2020-09-30 RX ADMIN — HEPARIN SODIUM 5000 UNIT(S): 5000 INJECTION INTRAVENOUS; SUBCUTANEOUS at 21:46

## 2020-09-30 RX ADMIN — Medication 40 MILLIGRAM(S): at 05:19

## 2020-09-30 RX ADMIN — Medication 5 MILLIGRAM(S): at 08:17

## 2020-09-30 RX ADMIN — Medication 3 UNIT(S): at 17:36

## 2020-09-30 RX ADMIN — TACROLIMUS 5 MILLIGRAM(S): 5 CAPSULE ORAL at 23:14

## 2020-09-30 RX ADMIN — Medication 125 MILLIGRAM(S): at 17:41

## 2020-09-30 RX ADMIN — HEPARIN SODIUM 5000 UNIT(S): 5000 INJECTION INTRAVENOUS; SUBCUTANEOUS at 13:09

## 2020-09-30 RX ADMIN — TACROLIMUS 5 MILLIGRAM(S): 5 CAPSULE ORAL at 11:05

## 2020-09-30 RX ADMIN — PANTOPRAZOLE SODIUM 40 MILLIGRAM(S): 20 TABLET, DELAYED RELEASE ORAL at 05:19

## 2020-09-30 RX ADMIN — HEPARIN SODIUM 5000 UNIT(S): 5000 INJECTION INTRAVENOUS; SUBCUTANEOUS at 05:19

## 2020-09-30 RX ADMIN — CHLORHEXIDINE GLUCONATE 1 APPLICATION(S): 213 SOLUTION TOPICAL at 08:20

## 2020-09-30 RX ADMIN — Medication 10 MILLIGRAM(S): at 08:19

## 2020-09-30 RX ADMIN — Medication 3 UNIT(S): at 13:08

## 2020-09-30 RX ADMIN — Medication 3 UNIT(S): at 08:19

## 2020-09-30 NOTE — PROGRESS NOTE ADULT - ASSESSMENT
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 s/p EGD with esophagitis and gastritis. Developed Klebsiella oxytoca bacteremia requiring transfer to CTU. Clinically improving, transferred to [facility], finished 10-day of ceftriaxone, however, developed severe C.diff colitis on Vancomycin 500mg q6h PO and IV Flagyl. He had RHC on 8/25 and found to have high filling pressure so transferred to CTU again.    #C.diff colitis with NORMAN- C.diff PCR detected (8/23). Repeat CT on 8/30 without evidence of toxic megacolon.   - clinically  has hit a plateau and without significant improvement but also without deterioration  tolerating minimal amounts of oral food  continue po vanco but dose to 125mg QID  through 9/24 followed by Vanco 125mg tid for one week followed by Vanco 125mg bid x 1 week through 10/1  then Vancomycin 125mg QD for a few weeks  continue Marinol to improve appetite  Decision made not to pursue Bezlotoxumab based upon limited data and possible cardiac side effects       #OI prophylaxis-  -Was previously receiving high dose steroids  -CMV viral load(8/17, 8/26): neg,   -Valcyte and Bactrim d/c'ed on 8/17 due to leukopenia  -Galactomann<0.5, Fungitell<31  -Steroids being tapered currently 10mg/day prednisone    # CMV viral load  9/17= 256 copies/ml  repeat CMV viral laod sent 9/28 and pending result  possibly just a blip reflective of his immunosuppressed state/steroids  hold off on treatment decision until recent results return    Current plan to discharge home toward the end of the week if able to ambulate in hallway    Gary Lujan MD  [phone]  After 5pm/weekends [phone]

## 2020-09-30 NOTE — PROGRESS NOTE ADULT - ASSESSMENT
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 Patient was treated w/ prednisone and Rituximab. Tacro was discontinued and patient was also transitioned to everolimus  Admitted  6/16-6/19  for hyperglycemia.  Reported to transplant team having one done black tarry stool-  instructed to  present to [facility] for hemolytic anemia. Patient has been following with Hematology outpatient.  Denies CP  N/V diarrhea SOB. (11 Aug 2020 20:08)  /11 Admitted to [facility] with symptomatic anemia  8/13 EGD for investigation of tarry stools  8/15 SB capsule: stomach & SB lesions, likely ulcers.  8/17 EGD/push enteroscopy w/ angioectasias/erosions in small bowel. Gastropathy and esophagitis. Ulcer seen on VCE most likely scope trauma.    8/18 VSS transferred back to floor.   8/20 VS 9.0/28.4 stable Gastric biopsy results LA Grade A esophagitis.  - Acute gastritis. Biopsies taken to r/o CMV, No ulceration seen despite finding on capsule endoscopy - likely 2/2 scope  trauma that has since resolved. Pathology pending.  8/21 VSS H/H  8.1/25.7  trending down will monitor prednisone taper 30 mg today. Procardia 120 initiated today RHC biopsy Monday.   8/22 VVS; Patient reports loose stools x 2-3 days with 1 episode today.  Stool sent for C-dif and GI PCR. Abdominal X-ray revealed: dilated loops of bowel. Abdomen distended.  Patient denies N/V. GI called to re-evaluate. Continue with current medication regimen.  Awaiting for upcoming cardiac biopsy on Monday 8/24.  8/23 VVS; creat up to 2.28, repeating CMP, TTE ordered Bladder scan   8/24 cardiac bx cancelled. elev creat  to 2.74   cardio renal cslt called, + CDIF  increase vanco to 500 q6 ID following  8/25 VSS: RHC today as per transplant team; transfuse 2 units PRBC today as per Dr. Viramontes; continue vanco for c-diff; transfer patient to CTU after cath today   8/26. Dieretic challenge with good response   8/27: Downgraded to the floor then upgraded to the CTU again for concerning of abd distention   8/28 CT ABD & Pelvis reveals pancolitis  8/30: repeat CT scan of abd, gen surgery called to re-evaluated pt.  8/31: RUE duplex negative for DVT  9/4 NGT clamped, minimal residual. NGT removed. started sips   9/6 tolerating clear liquid diet  9/8 Bronch  9/9 1 prbc   9/10 increased tube feeds to 20cc/hr and tpn decreased to 63cc/hr from 125cc/hr   9/12 TPN discontinued  9/14 Diet advanced, tube feeds at 75% goal, central line removed and midline inserted.  9/14 pt completed approp 75 % of meal.   pt ambulated to the door this afternoon  9/15 Pt tolerating PO diet with supplemental tube feeds. calorie count iniated  9/16 VSS OOB in chair no acute distress transferred to [address] [place] [address] nocturnal tube feeds 80 cc/hrx12 - RUE + edema Tacro level 7.2  PT disposition  to rehab.  9/177 VSS flat affect dronabinol added appetite stimulant at goal with Glucerna nocturnal feeds.. Rosas removed this am  9/18 VSS Remains on Glucerna TF, failed calorie count, Glucerna increased to 3 daily. Loose BM Improving--continue on Vanco 125mg QID taper as per ID.   9/19 VVS; RUE edema --> Vascular surgery consult called per CHF for prior occlusion of right subclavian.  Right Midline D/C'd . No evidence of DVT. CT Venogram of RUE to evaluate patency of subclavian  9/20 HD stable, cont PO vanco. Tacro level 9.4.   9/21 OOB to chait   VSS  Loose BM  this am   on vanco  9/22 VSS small oral intake with encouragement  this am. Calorie count in place  9/23 VSS appetite increasing oral intake improving per ID Vancomycin to be decreased to BID  in am.  9/24 HD stable.  Improving appetite.  Vanco decreased to bid x 1 week then 125mg QD x 1 week.  Calorie count in progress.  9/25 c/w present management  9/26 VSS Lab holiday labs- Sunday-  RUE improving . Oral dietary intake  and appetite improving  9/27 VSS Low potassium diet nocturnal feeds d/c  on calorie count- labs this am  9/28 dc keofeed calorie count , VVS  9/29 VSS; as per Dr. Stahl - hydralazine d/c'd - rehab Wednesday.   9/30 VVS; PT to re-evaluation for disposition to home. Continue with current medication regimen

## 2020-09-30 NOTE — PROGRESS NOTE ADULT - PROBLEM SELECTOR PLAN 4
Patient with ileus in setting of C.Diff- illeus resolved with bowel rest  Vancomycin PO decreased to 125mg bid x 1 week then 125mg QD x 1week til 10/8  ID following

## 2020-09-30 NOTE — PROGRESS NOTE ADULT - PROBLEM SELECTOR PLAN 2
- Continue tacrolimus, goal will be ~10 as monotherapy (previously on everolimus); c/w 5/5   - Continue prednisone 10 mg daily, wean per heme  - Resume pravastatin 20 mg nightly and aspirin 81 mg daily.

## 2020-09-30 NOTE — PROGRESS NOTE ADULT - PROBLEM SELECTOR PLAN 3
Calorie count completed today       Continue with low potassium regular diet  Continue with Glucerna supplements 1 can twice a day     9/17 dronabinol added for appetite

## 2020-09-30 NOTE — PROGRESS NOTE ADULT - SUBJECTIVE AND OBJECTIVE BOX
INFECTIOUS DISEASES FOLLOW UP-- Sujey Lujan  [phone]    This is a follow up note for this  70yMale with  Anemia  s/p OHTx  sever C.diff colitis- but significant improvement in symptoms over the past few days        ROS:  CONSTITUTIONAL:  No fever, good appetite  CARDIOVASCULAR:  No chest pain or palpitations  RESPIRATORY:  No dyspnea  GASTROINTESTINAL:  No nausea, vomiting, diarrhea, or abdominal pain  GENITOURINARY:  No dysuria  NEUROLOGIC:  No headache,     Allergies    No Known Allergies    Intolerances        ANTIBIOTICS/RELEVANT:  antimicrobials  vancomycin    Solution 125 milliGRAM(s) Oral every 12 hours    immunologic:  tacrolimus 5 milliGRAM(s) Oral <User Schedule>    OTHER:  chlorhexidine 2% Cloths 1 Application(s) Topical <User Schedule>  dextrose 40% Gel 15 Gram(s) Oral once PRN  dextrose 5%. 1000 milliLiter(s) IV Continuous <Continuous>  dextrose 50% Injectable 12.5 Gram(s) IV Push once  dextrose 50% Injectable 25 Gram(s) IV Push once  dextrose 50% Injectable 25 Gram(s) IV Push once  dronabinol 5 milliGRAM(s) Oral <User Schedule>  furosemide    Tablet 40 milliGRAM(s) Oral daily  glucagon  Injectable 1 milliGRAM(s) IntraMuscular once PRN  heparin   Injectable 5000 Unit(s) SubCutaneous every 8 hours  insulin lispro (HumaLOG) corrective regimen sliding scale   SubCutaneous three times a day before meals  insulin lispro (HumaLOG) corrective regimen sliding scale   SubCutaneous at bedtime  insulin lispro Injectable (HumaLOG) 3 Unit(s) SubCutaneous three times a day before meals  pantoprazole    Tablet 40 milliGRAM(s) Oral before breakfast  predniSONE   Tablet 10 milliGRAM(s) Oral <User Schedule>  sodium chloride 0.9% lock flush 10 milliLiter(s) IV Push every 1 hour PRN  sodium chloride 0.9% lock flush 3 milliLiter(s) IV Push every 8 hours      Objective:  Vital Signs Last 24 Hrs  T(C): 36.4 (30 Sep 2020 12:57), Max: 36.6 (29 Sep 2020 19:32)  T(F): 97.6 (30 Sep 2020 12:57), Max: 97.9 (29 Sep 2020 19:32)  HR: 118 (30 Sep 2020 12:57) (111 - 129)  BP: 116/77 (30 Sep 2020 12:57) (104/71 - 116/77)  BP(mean): 90 (30 Sep 2020 12:57) (90 - 90)  RR: 18 (30 Sep 2020 12:57) (18 - 18)  SpO2: 97% (30 Sep 2020 12:57) (97% - 100%)    PHYSICAL EXAM:  Constitutional:no acute distress  Eyes:WALT, EOMI  Ear/Nose/Throat: no oral lesions, 	  Respiratory: clear BL  Cardiovascular: S1S2  Gastrointestinal:soft, (+) BS, no tenderness  Extremities:no e/e/c  No Lymphadenopathy  IV sites not inflammed.    LABS:                        10.4   4.06  )-----------( 327      ( 30 Sep 2020 09:14 )             36.3     09-30    139  |  104  |  28<H>  ----------------------------<  93  5.2   |  24  |  1.41<H>    Ca    9.2      30 Sep 2020 09:14  Mg     2.1     09-30            MICROBIOLOGY:            RECENT CULTURES:      RADIOLOGY & ADDITIONAL STUDIES:    < from: Xray Chest 1 View- PORTABLE-Routine (Xray Chest 1 View- PORTABLE-Routine in AM.) (09.28.20 @ 09:15) >  IMPRESSION:    Right basilar linear atelectasis.    < end of copied text >

## 2020-09-30 NOTE — CHART NOTE - NSCHARTNOTEFT_GEN_A_CORE
Pt seen for Calorie Count assessment.     Chart reviewed, events noted. Medications/labs reviewed. Per chart, pt is a 70 year old male with PMHx of NICM, s/p HM2 LVAD and OHT in 2/2018, with known coronary fistula. Recent admission for hemolytic anemia, now admitted for hyperglycemia and GIB. EGD and capsule study negative. Pt now C-Diff colitis w/o toxic megacolon. Required TPN for acute severe malnutrition in the setting of Colitis and ileus.  TPN has now been discontinued since 9/12. tolerating PO diet well since NGT removed. Discharge planning. Per NP, no plan to reinitiate tube feeding at this time.    Diet: No Concentrated Potassium, Glucerna shake BID    CALORIE COUNT RESULTS  9/27 - 1764 kcal, 85 gm protein  9/28 - 1079 kcal, 56 gm protein  9/29 - 1744 kcal, 57 gm protein  - On average, pt meeting 81% of estimated energy needs, 63% of estimated protein needs  - This is an improvement since previous calorie count that was assessed on 9/26 which showed pt was meeting 53% of estimated energy needs and 40% of estimated protein needs.     Estimated Needs: based on dosing Wt: 75.2 Kg,   Energy: (25-30kcal/kg): 1880-2256kcal   Protein:  (1.4-1.6g protein/kg): 105-135g protein    Pt seen, sitting in chair. Reports feeling well. States appetite is improving significantly and increasing each day. Noted to be on Marinol. Tolerating current diet well. No GI distress reported. Pt reports is willing to drink an additional Glucerna shake daily to optimize intake, discussed with CTS NP. Encouraged intake of protein-rich foods and sources of such. Declined other oral nutrition supplements at this time.     Recommend:   1) Increase Glucerna shake to 3x/day. Continue No Concentrated Potassium restriction  2) Continue Marinol as an appetite stimulant as feasible  3) Encourage PO intake at meal times, sipping on supplements throughout the day, and provide assistance ordering from menu PRN    Continue to monitor and evaluate:   - Labs: electrolytes, blood glucose, renal indices, LFTs   - Weight trends, skin integrity, GI function & BMs    Izzy Perez RD, CDN    Pager# [phone]

## 2020-09-30 NOTE — PROGRESS NOTE ADULT - PROBLEM SELECTOR PLAN 1
Followed by HF team  Labs 3x weekly as per HF need phlebotomy today  Tacrolimus 5mg two times daily  9/29 hydralazine d/c'd

## 2020-09-30 NOTE — PROGRESS NOTE ADULT - SUBJECTIVE AND OBJECTIVE BOX
Subjective: No current complaints. He feels well. He no longer has diarrhea.     Medications:  chlorhexidine 2% Cloths 1 Application(s) Topical <User Schedule>  dextrose 40% Gel 15 Gram(s) Oral once PRN  dextrose 5%. 1000 milliLiter(s) IV Continuous <Continuous>  dextrose 50% Injectable 12.5 Gram(s) IV Push once  dextrose 50% Injectable 25 Gram(s) IV Push once  dextrose 50% Injectable 25 Gram(s) IV Push once  dronabinol 5 milliGRAM(s) Oral <User Schedule>  furosemide    Tablet 40 milliGRAM(s) Oral daily  glucagon  Injectable 1 milliGRAM(s) IntraMuscular once PRN  heparin   Injectable 5000 Unit(s) SubCutaneous every 8 hours  insulin lispro (HumaLOG) corrective regimen sliding scale   SubCutaneous three times a day before meals  insulin lispro (HumaLOG) corrective regimen sliding scale   SubCutaneous at bedtime  insulin lispro Injectable (HumaLOG) 3 Unit(s) SubCutaneous three times a day before meals  pantoprazole    Tablet 40 milliGRAM(s) Oral before breakfast  predniSONE   Tablet 10 milliGRAM(s) Oral <User Schedule>  sodium chloride 0.9% lock flush 10 milliLiter(s) IV Push every 1 hour PRN  sodium chloride 0.9% lock flush 3 milliLiter(s) IV Push every 8 hours  tacrolimus 5 milliGRAM(s) Oral <User Schedule>  vancomycin    Solution 125 milliGRAM(s) Oral every 12 hours      Physical Exam:    Vital Signs Last 24 Hrs  T(C): 36.4 (30 Sep 2020 04:45), Max: 36.6 (29 Sep 2020 19:32)  T(F): 97.5 (30 Sep 2020 04:45), Max: 97.9 (29 Sep 2020 19:32)  HR: 129 (30 Sep 2020 11:14) (111 - 129)  BP: 115/78 (30 Sep 2020 11:14) (98/68 - 115/78)  RR: 18 (30 Sep 2020 04:45) (18 - 18)  SpO2: 98% (30 Sep 2020 11:14) (97% - 100%)    Daily Weight in k.9 (30 Sep 2020 08:10)    I&O's Summary    29 Sep 2020 07:01  -  30 Sep 2020 07:00  --------------------------------------------------------  IN: 600 mL / OUT: 850 mL / NET: -250 mL    30 Sep 2020 07:01  -  30 Sep 2020 12:18  --------------------------------------------------------  IN: 240 mL / OUT: 450 mL / NET: -210 mL    General: No distress. Comfortable.  HEENT: EOM intact.  Neck: Neck supple. JVP not elevated. No masses  Chest: Clear to auscultation bilaterally  CV: Normal S1 and S2. No murmurs, rub, or gallops. Radial pulses normal.  Abdomen: Soft, non-distended, non-tender  Skin: No rashes or skin breakdown  Neurology: Alert and oriented times three. Sensation intact  Psych: Affect normal    Labs:                        10.4   4.06  )-----------( 327      ( 30 Sep 2020 09:14 )             36.3     0930    139  |  104  |  28<H>  ----------------------------<  93  5.2   |  24  |  1.41<H>    Ca    9.2      30 Sep 2020 09:14  Mg     2.1           Tacrolimus (), Serum: 8.3: Tacrolimus testing is performed on the [employer]  by   chemiluminescent microparticle immunoassay. The therapeutic range of   tacrolimus is not clearly defined but target 12-hour trough whole blood   concentrations are 5.0 - 20.0 ng/mL early post transplant. Twenty-four   hour   trough concentrations are 33-50% less than the corresponding 12-hour   trough. ng/mL (20 @ 10:01)

## 2020-09-30 NOTE — PROGRESS NOTE ADULT - SUBJECTIVE AND OBJECTIVE BOX
VITAL SIGNS    Subjective: "I'm feeling ok". Denies CP, palpitation, SOB, LOBATO, HA, dizziness, N/V/D, fever or chills.  No acute event noted overnight.     Telemetry: -120    Vital Signs Last 24 Hrs  T(C): 36.4 (20 @ 12:57), Max: 36.6 (20 @ 19:32)  T(F): 97.6 (20 @ 12:57), Max: 97.9 (20 @ 19:32)  HR: 118 (20 @ 12:57) (111 - 129)  BP: 116/77 (20 @ 12:57) (104/71 - 116/77)  RR: 18 (20 @ 12:57) (18 - 18)  SpO2: 97% (20 @ 12:57) (97% - 100%)            07:01  -   @ 07:00  --------------------------------------------------------  IN: 600 mL / OUT: 850 mL / NET: -250 mL     @ 07:01  -   @ 14:47  --------------------------------------------------------  IN: 720 mL / OUT: 750 mL / NET: -30 mL    Daily     Daily Weight in k.9 (30 Sep 2020 08:10)    CAPILLARY BLOOD GLUCOSE    POCT Blood Glucose.: 100 mg/dL (30 Sep 2020 12:48)  POCT Blood Glucose.: 89 mg/dL (30 Sep 2020 07:59)  POCT Blood Glucose.: 82 mg/dL (29 Sep 2020 22:07)  POCT Blood Glucose.: 108 mg/dL (29 Sep 2020 16:51)        PHYSICAL EXAM    Neurology: alert and oriented x 3, nonfocal, no gross deficits    CV: (+) S1 and S2, No murmurs, rubs, gallops or clicks     Sternal Wound:  MSI -->CDI , sternum stable    Lungs: CTA B/L     Abdomen: soft, nontender, nondistended, positive bowel sounds, (+) Flatus; (+) BM     :  Voiding               Extremities:  B/L LE (+) trace edema; negative calf tenderness; (+) 2 DP palpable        chlorhexidine 2% Cloths 1 Application(s) Topical <User Schedule>  dronabinol 5 milliGRAM(s) Oral <User Schedule>  furosemide  Tablet 40 milliGRAM(s) Oral daily  glucagon  Injectable 1 milliGRAM(s) IntraMuscular once PRN  heparin  Injectable 5000 Unit(s) SubCutaneous every 8 hours  insulin lispro (HumaLOG) corrective regimen sliding scale   SubCutaneous three times a day before meals  insulin lispro (HumaLOG) corrective regimen sliding scale   SubCutaneous at bedtime  insulin lispro Injectable (HumaLOG) 3 Unit(s) SubCutaneous three times a day before meals  pantoprazole Tablet 40 milliGRAM(s) Oral before breakfast  predniSONE Tablet 10 milliGRAM(s) Oral <User Schedule>  sodium chloride 0.9% lock flush 10 milliLiter(s) IV Push every 1 hour PRN  sodium chloride 0.9% lock flush 3 milliLiter(s) IV Push every 8 hours  tacrolimus 5 milliGRAM(s) Oral <User Schedule>  vancomycin  Solution 125 milliGRAM(s) Oral every 12 hours    Physical Therapy Rec:   Home  [ X ]   Home w/ PT  [  ]  Rehab  [  ]    Discussed with Cardiothoracic Team at AM rounds.

## 2020-09-30 NOTE — PROGRESS NOTE ADULT - PROBLEM SELECTOR PROBLEM 8
Transaminitis
Bilateral edema of lower extremity
Transaminitis
Transaminitis
Bilateral edema of lower extremity
Bilateral edema of lower extremity
Transaminitis
Bilateral edema of lower extremity

## 2020-10-01 LAB
ANION GAP SERPL CALC-SCNC: 14 MMOL/L — SIGNIFICANT CHANGE UP (ref 5–17)
BUN SERPL-MCNC: 26 MG/DL — HIGH (ref 7–23)
CALCIUM SERPL-MCNC: 9.2 MG/DL — SIGNIFICANT CHANGE UP (ref 8.4–10.5)
CHLORIDE SERPL-SCNC: 104 MMOL/L — SIGNIFICANT CHANGE UP (ref 96–108)
CO2 SERPL-SCNC: 20 MMOL/L — LOW (ref 22–31)
CREAT SERPL-MCNC: 1.37 MG/DL — HIGH (ref 0.5–1.3)
GLUCOSE BLDC GLUCOMTR-MCNC: 105 MG/DL — HIGH (ref 70–99)
GLUCOSE BLDC GLUCOMTR-MCNC: 113 MG/DL — HIGH (ref 70–99)
GLUCOSE BLDC GLUCOMTR-MCNC: 163 MG/DL — HIGH (ref 70–99)
GLUCOSE BLDC GLUCOMTR-MCNC: 82 MG/DL — SIGNIFICANT CHANGE UP (ref 70–99)
GLUCOSE SERPL-MCNC: 137 MG/DL — HIGH (ref 70–99)
HCT VFR BLD CALC: 35 % — LOW (ref 39–50)
HGB BLD-MCNC: 10.5 G/DL — LOW (ref 13–17)
MCHC RBC-ENTMCNC: 30 GM/DL — LOW (ref 32–36)
MCHC RBC-ENTMCNC: 31.5 PG — SIGNIFICANT CHANGE UP (ref 27–34)
MCV RBC AUTO: 105.1 FL — HIGH (ref 80–100)
NRBC # BLD: 0 /100 WBCS — SIGNIFICANT CHANGE UP (ref 0–0)
PHOSPHATE SERPL-MCNC: 3.8 MG/DL — SIGNIFICANT CHANGE UP (ref 2.5–4.5)
PLATELET # BLD AUTO: 346 K/UL — SIGNIFICANT CHANGE UP (ref 150–400)
POTASSIUM SERPL-MCNC: 4.2 MMOL/L — SIGNIFICANT CHANGE UP (ref 3.5–5.3)
POTASSIUM SERPL-SCNC: 4.2 MMOL/L — SIGNIFICANT CHANGE UP (ref 3.5–5.3)
RBC # BLD: 3.33 M/UL — LOW (ref 4.2–5.8)
RBC # FLD: 21.2 % — HIGH (ref 10.3–14.5)
SODIUM SERPL-SCNC: 138 MMOL/L — SIGNIFICANT CHANGE UP (ref 135–145)
TACROLIMUS SERPL-MCNC: 7.5 NG/ML — SIGNIFICANT CHANGE UP
WBC # BLD: 4.59 K/UL — SIGNIFICANT CHANGE UP (ref 3.8–10.5)
WBC # FLD AUTO: 4.59 K/UL — SIGNIFICANT CHANGE UP (ref 3.8–10.5)

## 2020-10-01 PROCEDURE — 99233 SBSQ HOSP IP/OBS HIGH 50: CPT

## 2020-10-01 PROCEDURE — 99232 SBSQ HOSP IP/OBS MODERATE 35: CPT

## 2020-10-01 PROCEDURE — 90791 PSYCH DIAGNOSTIC EVALUATION: CPT

## 2020-10-01 PROCEDURE — 99232 SBSQ HOSP IP/OBS MODERATE 35: CPT | Mod: GC

## 2020-10-01 RX ORDER — FOLIC ACID 1 MG/1
1 TABLET ORAL DAILY
Qty: 30 | Refills: 5 | Status: DISCONTINUED | COMMUNITY
Start: 2020-05-05 | End: 2020-10-01

## 2020-10-01 RX ORDER — ACETAMINOPHEN 500 MG
650 TABLET ORAL EVERY 6 HOURS
Refills: 0 | Status: DISCONTINUED | OUTPATIENT
Start: 2020-10-01 | End: 2020-10-02

## 2020-10-01 RX ORDER — INSULIN GLARGINE 100 [IU]/ML
100 INJECTION, SOLUTION SUBCUTANEOUS DAILY
Qty: 15 | Refills: 5 | Status: DISCONTINUED | COMMUNITY
Start: 2020-06-18 | End: 2020-10-01

## 2020-10-01 RX ORDER — ICOSAPENT ETHYL 1000 MG/1
1 CAPSULE ORAL
Qty: 120 | Refills: 5 | Status: DISCONTINUED | COMMUNITY
Start: 2020-08-21 | End: 2020-10-01

## 2020-10-01 RX ORDER — OMEGA-3/DHA/EPA/FISH OIL 300-1000MG
400 CAPSULE ORAL
Qty: 30 | Refills: 5 | Status: DISCONTINUED | COMMUNITY
Start: 2018-04-12 | End: 2020-10-01

## 2020-10-01 RX ORDER — DRONABINOL 2.5 MG
5 CAPSULE ORAL
Refills: 0 | Status: DISCONTINUED | OUTPATIENT
Start: 2020-10-01 | End: 2020-10-02

## 2020-10-01 RX ADMIN — Medication 5 MILLIGRAM(S): at 12:20

## 2020-10-01 RX ADMIN — Medication 3 UNIT(S): at 07:54

## 2020-10-01 RX ADMIN — TACROLIMUS 5 MILLIGRAM(S): 5 CAPSULE ORAL at 22:40

## 2020-10-01 RX ADMIN — HEPARIN SODIUM 5000 UNIT(S): 5000 INJECTION INTRAVENOUS; SUBCUTANEOUS at 13:19

## 2020-10-01 RX ADMIN — HEPARIN SODIUM 5000 UNIT(S): 5000 INJECTION INTRAVENOUS; SUBCUTANEOUS at 22:40

## 2020-10-01 RX ADMIN — Medication 650 MILLIGRAM(S): at 16:10

## 2020-10-01 RX ADMIN — Medication 5 MILLIGRAM(S): at 13:15

## 2020-10-01 RX ADMIN — Medication 3 UNIT(S): at 16:52

## 2020-10-01 RX ADMIN — TACROLIMUS 5 MILLIGRAM(S): 5 CAPSULE ORAL at 09:43

## 2020-10-01 RX ADMIN — Medication 3 UNIT(S): at 12:12

## 2020-10-01 RX ADMIN — Medication 5 MILLIGRAM(S): at 08:10

## 2020-10-01 RX ADMIN — Medication 125 MILLIGRAM(S): at 17:20

## 2020-10-01 RX ADMIN — Medication 650 MILLIGRAM(S): at 15:10

## 2020-10-01 RX ADMIN — HEPARIN SODIUM 5000 UNIT(S): 5000 INJECTION INTRAVENOUS; SUBCUTANEOUS at 05:06

## 2020-10-01 RX ADMIN — Medication 125 MILLIGRAM(S): at 05:07

## 2020-10-01 RX ADMIN — Medication 10 MILLIGRAM(S): at 08:10

## 2020-10-01 RX ADMIN — Medication 2: at 12:11

## 2020-10-01 RX ADMIN — CHLORHEXIDINE GLUCONATE 1 APPLICATION(S): 213 SOLUTION TOPICAL at 07:53

## 2020-10-01 RX ADMIN — PANTOPRAZOLE SODIUM 40 MILLIGRAM(S): 20 TABLET, DELAYED RELEASE ORAL at 05:06

## 2020-10-01 RX ADMIN — Medication 40 MILLIGRAM(S): at 05:07

## 2020-10-01 NOTE — PROGRESS NOTE ADULT - NSHPATTENDINGPLANDISCUSS_GEN_ALL_CORE
Dr Tejada
Team
Dr Adhikari
Dr. Deshpande
MD Radha Campbell
Team
Dr. Deshpande
Dr. Lockett
Dr. Lockett
fellows
team
Dr Parker
ctu
team and patient
CTU
Team
Dr Parker
cts rounds
CTS team
Dr. LAWLER
CTS
CTS team
CTS team
Dr. Parker
CTS
CTS team
CTU team and Dr. Parker
CTU team and Dr. Parker on MDT rounds
CTU team and Dr. Parker on MDT rounds
Dr Parker
Dr Parker

## 2020-10-01 NOTE — PROGRESS NOTE ADULT - ASSESSMENT
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 Patient was treated w/ prednisone and Rituximab. Tacro was discontinued and patient was also transitioned to everolimus  Admitted  6/16-6/19  for hyperglycemia.  Reported to transplant team having one done black tarry stool-  instructed to  present to [facility] for hemolytic anemia. Patient has been following with Hematology outpatient.  Denies CP  N/V diarrhea SOB. (11 Aug 2020 20:08)  /11 Admitted to [facility] with symptomatic anemia  8/13 EGD for investigation of tarry stools  8/15 SB capsule: stomach & SB lesions, likely ulcers.  8/17 EGD/push enteroscopy w/ angioectasias/erosions in small bowel. Gastropathy and esophagitis. Ulcer seen on VCE most likely scope trauma.    8/18 VSS transferred back to floor.   8/20 VS 9.0/28.4 stable Gastric biopsy results LA Grade A esophagitis.  - Acute gastritis. Biopsies taken to r/o CMV, No ulceration seen despite finding on capsule endoscopy - likely 2/2 scope  trauma that has since resolved. Pathology pending.  8/21 VSS H/H  8.1/25.7  trending down will monitor prednisone taper 30 mg today. Procardia 120 initiated today RHC biopsy Monday.   8/22 VVS; Patient reports loose stools x 2-3 days with 1 episode today.  Stool sent for C-dif and GI PCR. Abdominal X-ray revealed: dilated loops of bowel. Abdomen distended.  Patient denies N/V. GI called to re-evaluate. Continue with current medication regimen.  Awaiting for upcoming cardiac biopsy on Monday 8/24.  8/23 VVS; creat up to 2.28, repeating CMP, TTE ordered Bladder scan   8/24 cardiac bx cancelled. elev creat  to 2.74   cardio renal cslt called, + CDIF  increase vanco to 500 q6 ID following  8/25 VSS: RHC today as per transplant team; transfuse 2 units PRBC today as per Dr. Viramontes; continue vanco for c-diff; transfer patient to CTU after cath today   8/26. Dieretic challenge with good response   8/27: Downgraded to the floor then upgraded to the CTU again for concerning of abd distention   8/28 CT ABD & Pelvis reveals pancolitis  8/30: repeat CT scan of abd, gen surgery called to re-evaluated pt.  8/31: RUE duplex negative for DVT  9/4 NGT clamped, minimal residual. NGT removed. started sips   9/6 tolerating clear liquid diet  9/8 Bronch  9/9 1 prbc   9/10 increased tube feeds to 20cc/hr and tpn decreased to 63cc/hr from 125cc/hr   9/12 TPN discontinued  9/14 Diet advanced, tube feeds at 75% goal, central line removed and midline inserted.  9/14 pt completed approp 75 % of meal.   pt ambulated to the door this afternoon  9/15 Pt tolerating PO diet with supplemental tube feeds. calorie count iniated  9/16 VSS OOB in chair no acute distress transferred to [address] [place] [address] nocturnal tube feeds 80 cc/hrx12 - RUE + edema Tacro level 7.2  PT disposition  to rehab.  9/177 VSS flat affect dronabinol added appetite stimulant at goal with Glucerna nocturnal feeds.. Rosas removed this am  9/18 VSS Remains on Glucerna TF, failed calorie count, Glucerna increased to 3 daily. Loose BM Improving--continue on Vanco 125mg QID taper as per ID.   9/19 VVS; RUE edema --> Vascular surgery consult called per CHF for prior occlusion of right subclavian.  Right Midline D/C'd . No evidence of DVT. CT Venogram of RUE to evaluate patency of subclavian  9/20 HD stable, cont PO vanco. Tacro level 9.4.   9/21 OOB to chait   VSS  Loose BM  this am   on vanco  9/22 VSS small oral intake with encouragement  this am. Calorie count in place  9/23 VSS appetite increasing oral intake improving per ID Vancomycin to be decreased to BID  in am.  9/24 HD stable.  Improving appetite.  Vanco decreased to bid x 1 week then 125mg QD x 1 week.  Calorie count in progress.  9/25 c/w present management  9/26 VSS Lab holiday labs- Sunday-  RUE improving . Oral dietary intake  and appetite improving  9/27 VSS Low potassium diet nocturnal feeds d/c  on calorie count- labs this am  9/28 dc keofeed calorie count , VVS  9/29 VSS; as per Dr. Stahl - hydradany d/c'd - rehab Wednesday.   9/30 VVS; PT to re-evaluation for disposition to home. Continue with current medication regimen     10/1 VSS - d/c home Friday.

## 2020-10-01 NOTE — PROGRESS NOTE ADULT - ASSESSMENT
Mr. 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 He was treated with Rituximab and high dose steroids. He is currently admitted with symptomatic anemia with Hgb initially of 6.6 requiring blood transfusions. Evaluation was not consistent with hemolysis. EGD/enteroscopy eventually revealed chronic gastritis and small bowel ectasias.  His course was complicated by development of severe leukopenia and acute respiratory distress and profound sinus tachycardia on 8/13 in setting of Klebsiella bacteremia of unclear origin (preceded EGD). A CT scan of the chest showed a possible infiltrate. While his bacteremia was treated, he subsequently developed C diff colitis with severe abdominal distention. He also developed worsening acute on chronic renal failure likely from volume overload which has resolved with diuretics. He has ongoing improvement in abdominal distention and leukocytosis. He was briefly on TPN but now tolerating enteral feeds. He also has RUE pain/swelling likely related to known R subclavian occlusion with TPN administered through R midline which has since been discontinued and is improving. He continues to progress well and feels much better.       Plan discussed in multidisciplinary round with [facility] NP team and CT surgery.

## 2020-10-01 NOTE — PROGRESS NOTE ADULT - SUBJECTIVE AND OBJECTIVE BOX
INFECTIOUS DISEASES FOLLOW UP-- Sujey Lujan  [phone]    This is a follow up note for this  70yMale with  Anemia  C.diff colitis slowly resolving        ROS:  CONSTITUTIONAL:  No fever, good appetite/much improved  CARDIOVASCULAR:  No chest pain or palpitations  RESPIRATORY:  No dyspnea  GASTROINTESTINAL:  No nausea, vomiting, diarrhea, or abdominal pain  GENITOURINARY:  No dysuria  NEUROLOGIC:  No headache,     Allergies    No Known Allergies    Intolerances        ANTIBIOTICS/RELEVANT:  antimicrobials  vancomycin    Solution 125 milliGRAM(s) Oral every 12 hours    immunologic:  tacrolimus 5 milliGRAM(s) Oral <User Schedule>    OTHER:  acetaminophen   Tablet .. 650 milliGRAM(s) Oral every 6 hours PRN  chlorhexidine 2% Cloths 1 Application(s) Topical <User Schedule>  dextrose 40% Gel 15 Gram(s) Oral once PRN  dextrose 5%. 1000 milliLiter(s) IV Continuous <Continuous>  dextrose 50% Injectable 12.5 Gram(s) IV Push once  dextrose 50% Injectable 25 Gram(s) IV Push once  dextrose 50% Injectable 25 Gram(s) IV Push once  furosemide    Tablet 40 milliGRAM(s) Oral daily  glucagon  Injectable 1 milliGRAM(s) IntraMuscular once PRN  heparin   Injectable 5000 Unit(s) SubCutaneous every 8 hours  insulin lispro (HumaLOG) corrective regimen sliding scale   SubCutaneous three times a day before meals  insulin lispro (HumaLOG) corrective regimen sliding scale   SubCutaneous at bedtime  insulin lispro Injectable (HumaLOG) 3 Unit(s) SubCutaneous three times a day before meals  pantoprazole    Tablet 40 milliGRAM(s) Oral before breakfast  predniSONE   Tablet 10 milliGRAM(s) Oral <User Schedule>  sodium chloride 0.9% lock flush 10 milliLiter(s) IV Push every 1 hour PRN  sodium chloride 0.9% lock flush 3 milliLiter(s) IV Push every 8 hours      Objective:  Vital Signs Last 24 Hrs  T(C): 36.6 (01 Oct 2020 12:34), Max: 36.6 (01 Oct 2020 12:34)  T(F): 97.9 (01 Oct 2020 12:34), Max: 97.9 (01 Oct 2020 12:34)  HR: 111 (01 Oct 2020 12:34) (103 - 129)  BP: 102/77 (01 Oct 2020 12:34) (102/77 - 112/76)  BP(mean): --  RR: 18 (01 Oct 2020 12:34) (18 - 18)  SpO2: 99% (01 Oct 2020 12:34) (97% - 99%)    PHYSICAL EXAM:  Constitutional:no acute distress  Eyes:WALT, EOMI  Ear/Nose/Throat: no oral lesions, 	  Respiratory: clear BL  Cardiovascular: S1S2 soft syst murmur  Gastrointestinal:soft, (+) BS, no tenderness  Extremities:no e/e/c  No Lymphadenopathy  IV sites not inflammed.    LABS:                        10.5   4.59  )-----------( 346      ( 01 Oct 2020 10:08 )             35.0     10-01    138  |  104  |  26<H>  ----------------------------<  137<H>  4.2   |  20<L>  |  1.37<H>    Ca    9.2      01 Oct 2020 10:08  Phos  3.8     10-01  Mg     2.1     09-30            MICROBIOLOGY:    Cytomegalovirus By PCR:   256 TESTING PERFORMED AT LOW VOLUME ON PLASMA SPECIMEN FOR CMV, MAY  AFFECT RESULTS.  Assay Range: 96 IU/mL to 1.88E+08 IU/mL  One IU is equal to 0.53 copies of CMV.  The limit of quantitation (LOQ) is 96 IU/mL. CMV DNA detected below  the LOQ willbe reported as Detected:<96 IU/mL.  This test was developed and its performance characteristics  determined by [facility]. It has not been cleared or approved  by the U.S. Food and Drug Administration. Results should be used in  conjunction with clinical findings, and should not form the sole  basis for a diagnosis or treatment decision.  ____________________________________________________________  Performed at:  [email or web address]  [address] [place] [address] [phone]  : Alejandrina Serna PhD HCLD(ABB)  CLIA# [id number] (09.17.20 @ 11:57)            RECENT CULTURES:      RADIOLOGY & ADDITIONAL STUDIES:    < from: Xray Chest 1 View- PORTABLE-Routine (Xray Chest 1 View- PORTABLE-Routine in AM.) (09.28.20 @ 09:15) >  IMPRESSION:    Right basilar linear atelectasis.    < end of copied text >

## 2020-10-01 NOTE — PROGRESS NOTE ADULT - ASSESSMENT
Mood upbeat. Sitting in bed. Continues to feel better each day. Working with PT. Appetite good. Sleeping well. Denies symptoms of anxiety/depression. Reviewed strategies for coping with stress. Receptive to support and validation.      Dx: Adjustment disorder; unspecified F43.20; r/o delirium F05     Recommendations:   Explain all information in simple, concise language with teach back to ensure understanding  Include support system in all education   Holistic RN  Behavioral Cardiology will continue to follow

## 2020-10-01 NOTE — PROGRESS NOTE ADULT - SUBJECTIVE AND OBJECTIVE BOX
VITAL SIGNS-Telemetry:  SR/ST   Vital Signs Last 24 Hrs  T(C): 36.5 (20 @ 19:58), Max: 36.5 (20 @ 19:58)  T(F): 97.7 (20 @ 19:58), Max: 97.7 (20 @ 19:58)  HR: 103 (20 @ 19:58) (103 - 129)  BP: 103/73 (20 @ 19:58) (103/73 - 116/77)  RR: 18 (20 @ 19:58) (18 - 18)  SpO2: 97% (20 @ 19:58) (97% - 98%)          @ 07:  -  10-01 @ 07:00  --------------------------------------------------------  IN: 960 mL / OUT: 1450 mL / NET: -490 mL    10-01 @ 07:01  -  10-01 @ 08:59  --------------------------------------------------------  IN: 0 mL / OUT: 200 mL / NET: -200 mL    Daily     Daily Weight in k.8 (01 Oct 2020 07:15)    CAPILLARY BLOOD GLUCOSE  POCT Blood Glucose.: 82 mg/dL (01 Oct 2020 07:49)  POCT Blood Glucose.: 100 mg/dL (30 Sep 2020 21:46)  POCT Blood Glucose.: 109 mg/dL (30 Sep 2020 17:05)  POCT Blood Glucose.: 100 mg/dL (30 Sep 2020 12:48)        PHYSICAL EXAM:  Neurology: alert and oriented x 3, nonfocal, no gross deficits  CV : S1S2  Sternal Wound :  CDI , Stable  Lungs: CTA  Abdomen: soft, nontender, nondistended, positive bowel sounds, last bowel movement      +bm    Extremities:     no edema ; no calf tenderness    chlorhexidine 2% Cloths 1 Application(s) Topical <User Schedule>  dextrose 40% Gel 15 Gram(s) Oral once PRN  dextrose 5%. 1000 milliLiter(s) IV Continuous <Continuous>  dextrose 50% Injectable 12.5 Gram(s) IV Push once  dextrose 50% Injectable 25 Gram(s) IV Push once  dextrose 50% Injectable 25 Gram(s) IV Push once  dronabinol 5 milliGRAM(s) Oral <User Schedule>  furosemide    Tablet 40 milliGRAM(s) Oral daily  glucagon  Injectable 1 milliGRAM(s) IntraMuscular once PRN  heparin   Injectable 5000 Unit(s) SubCutaneous every 8 hours  insulin lispro (HumaLOG) corrective regimen sliding scale   SubCutaneous three times a day before meals  insulin lispro (HumaLOG) corrective regimen sliding scale   SubCutaneous at bedtime  insulin lispro Injectable (HumaLOG) 3 Unit(s) SubCutaneous three times a day before meals  pantoprazole    Tablet 40 milliGRAM(s) Oral before breakfast  predniSONE   Tablet 10 milliGRAM(s) Oral <User Schedule>  sodium chloride 0.9% lock flush 10 milliLiter(s) IV Push every 1 hour PRN  sodium chloride 0.9% lock flush 3 milliLiter(s) IV Push every 8 hours  tacrolimus 5 milliGRAM(s) Oral <User Schedule>  vancomycin    Solution 125 milliGRAM(s) Oral every 12 hours    Physical Therapy Rec:   Home  [ x ]   Home w/ PT  [  ]  Rehab  [  ]  Discussed with Cardiothoracic Team at AM rounds.

## 2020-10-01 NOTE — PROGRESS NOTE ADULT - SUBJECTIVE AND OBJECTIVE BOX
Behavioral Cardiology Psychological Assessment     History of present illness: Mr. Baez is a 70 year old man with history of dilated nonischemic cardiomyopathy, s/p OHT 2018, CKD III, HCV s/p treatment, and recently diagnosed autoimmune hemolytic anemia who is admitted with symptomatic anemia with Hgb of 6.6. Found to have chronic gastritis and small bowel ectasias. Has worsening abdominal distention with finding of ileus. CT scan with pancolitis. Started on IVIG , rectal vanc and Eravacylcine on .     Social history: , lives in a private house he owns in [place]. His younger brother (Rivas Davey) lives with him. Pt’s wife   and his only son was killed 5 years ago. He has an 19 y/o granddaughter that lives nearby with her mother and just started college this week. Reports a close relationship with his siblings (6 brothers and 1 sister) all but one brother live in [place]. Identified his adopted "godbrother" (Nawaf Barahona [phone]) as primary support person and HCP. Identified a close friend (Tahira) who lives close by as additional support and alternate HCP; she is very involved in his care. His brother (Rivas Davey age 45) lives with him. Mother  (age 79); father . He has 2 daughters from a previous relationship but is not part of their lives. Worked as a  at [employer] for 40+ years; retired 2 years ago. On disability. Information obtained from patient and chart. Education: 10th Grade (limited literacy skills).     Past psychiatric history: In past experienced depression related to bereavement issues following wife’s death () and son’s death (). Never sought treatment. Denies any other psychiatric issues. No history of s/a. No inpatient psychiatric admissions.     Psychological assessment: Mood upbeat. Sitting in bed. Continues to feel better each day. Working with PT. Appetite good. Sleeping well. Denies symptoms of anxiety/depression. Reviewed strategies for coping with stress. Receptive to support and validation.      Mental status exam: Seen sitting in bed. Pleasant and cooperative, well related with good eye contact. Awake, oriented x3. Speech normal rate/volume. Thought process goal directed. No evidence of any psychosis, jona, delusions. Mood "I'm doing good." Affect full range. No s/i. Insight and judgment adequate.

## 2020-10-01 NOTE — PROGRESS NOTE ADULT - SUBJECTIVE AND OBJECTIVE BOX
Subjective: No current complaints. He feels well. He does note mild shortness of breath on exertion.     Medications:  chlorhexidine 2% Cloths 1 Application(s) Topical <User Schedule>  dextrose 40% Gel 15 Gram(s) Oral once PRN  dextrose 5%. 1000 milliLiter(s) IV Continuous <Continuous>  dextrose 50% Injectable 12.5 Gram(s) IV Push once  dextrose 50% Injectable 25 Gram(s) IV Push once  dextrose 50% Injectable 25 Gram(s) IV Push once  dronabinol 5 milliGRAM(s) Oral <User Schedule>  furosemide    Tablet 40 milliGRAM(s) Oral daily  glucagon  Injectable 1 milliGRAM(s) IntraMuscular once PRN  heparin   Injectable 5000 Unit(s) SubCutaneous every 8 hours  insulin lispro (HumaLOG) corrective regimen sliding scale   SubCutaneous three times a day before meals  insulin lispro (HumaLOG) corrective regimen sliding scale   SubCutaneous at bedtime  insulin lispro Injectable (HumaLOG) 3 Unit(s) SubCutaneous three times a day before meals  pantoprazole    Tablet 40 milliGRAM(s) Oral before breakfast  predniSONE   Tablet 10 milliGRAM(s) Oral <User Schedule>  sodium chloride 0.9% lock flush 10 milliLiter(s) IV Push every 1 hour PRN  sodium chloride 0.9% lock flush 3 milliLiter(s) IV Push every 8 hours  tacrolimus 5 milliGRAM(s) Oral <User Schedule>  vancomycin    Solution 125 milliGRAM(s) Oral every 12 hours      Physical Exam:    Vital Signs Last 24 Hrs  T(C): 36.5 (30 Sep 2020 19:58), Max: 36.5 (30 Sep 2020 19:58)  T(F): 97.7 (30 Sep 2020 19:58), Max: 97.7 (30 Sep 2020 19:58)  HR: 129 (01 Oct 2020 09:30) (103 - 129)  BP: 112/76 (01 Oct 2020 09:30) (103/73 - 116/77)  BP(mean): 90 (30 Sep 2020 12:57) (90 - 90)  RR: 18 (01 Oct 2020 09:30) (18 - 18)  SpO2: 98% (01 Oct 2020 09:30) (97% - 98%)      Daily Weight in k.8 (01 Oct 2020 07:15)    I&O's Summary    30 Sep 2020 07:01  -  01 Oct 2020 07:00  --------------------------------------------------------  IN: 960 mL / OUT: 1450 mL / NET: -490 mL    01 Oct 2020 07:01  -  01 Oct 2020 12:11  --------------------------------------------------------  IN: 0 mL / OUT: 200 mL / NET: -200 mL        General: No distress. Comfortable.  HEENT: EOM intact.  Neck: Neck supple. JVP not elevated. No masses  Chest: Clear to auscultation bilaterally  CV: Tachycardic. Normal S1 and S2. No rubs or gallops. Radial pulses normal.  Abdomen: Soft, non-distended, non-tender  Skin: No rashes or skin breakdown  Neurology: Alert and oriented times three. Sensation intact  Psych: Affect normal    Labs:                        10.5   4.59  )-----------( 346      ( 01 Oct 2020 10:08 )             35.0     10-01    138  |  104  |  26<H>  ----------------------------<  137<H>  4.2   |  20<L>  |  1.37<H>    Ca    9.2      01 Oct 2020 10:08  Phos  3.8     10-01  Mg     2.1             tacTacrolimus (), Serum: 8.8: Tacrolimus testing is performed on the [employer]  by   chemiluminescent microparticle immunoassay. The therapeutic range of   tacrolimus is not clearly defined but target 12-hour trough whole blood   concentrations are 5.0 - 20.0 ng/mL early post transplant. Twenty-four   hour   trough concentrations are 33-50% less than the corresponding 12-hour   trough. ng/mL (20 @ 11:54)

## 2020-10-02 ENCOUNTER — TRANSCRIPTION ENCOUNTER (OUTPATIENT)
Age: 70
End: 2020-10-02

## 2020-10-02 VITALS
HEART RATE: 104 BPM | TEMPERATURE: 98 F | OXYGEN SATURATION: 100 % | RESPIRATION RATE: 18 BRPM | DIASTOLIC BLOOD PRESSURE: 70 MMHG | SYSTOLIC BLOOD PRESSURE: 103 MMHG

## 2020-10-02 LAB
ANION GAP SERPL CALC-SCNC: 14 MMOL/L — SIGNIFICANT CHANGE UP (ref 5–17)
BUN SERPL-MCNC: 25 MG/DL — HIGH (ref 7–23)
CALCIUM SERPL-MCNC: 9.5 MG/DL — SIGNIFICANT CHANGE UP (ref 8.4–10.5)
CHLORIDE SERPL-SCNC: 103 MMOL/L — SIGNIFICANT CHANGE UP (ref 96–108)
CO2 SERPL-SCNC: 23 MMOL/L — SIGNIFICANT CHANGE UP (ref 22–31)
CREAT SERPL-MCNC: 1.24 MG/DL — SIGNIFICANT CHANGE UP (ref 0.5–1.3)
GLUCOSE BLDC GLUCOMTR-MCNC: 102 MG/DL — HIGH (ref 70–99)
GLUCOSE SERPL-MCNC: 100 MG/DL — HIGH (ref 70–99)
HCT VFR BLD CALC: 40.3 % — SIGNIFICANT CHANGE UP (ref 39–50)
HGB BLD-MCNC: 12.2 G/DL — LOW (ref 13–17)
MCHC RBC-ENTMCNC: 30.3 GM/DL — LOW (ref 32–36)
MCHC RBC-ENTMCNC: 32.4 PG — SIGNIFICANT CHANGE UP (ref 27–34)
MCV RBC AUTO: 106.9 FL — HIGH (ref 80–100)
NRBC # BLD: 0 /100 WBCS — SIGNIFICANT CHANGE UP (ref 0–0)
PLATELET # BLD AUTO: 324 K/UL — SIGNIFICANT CHANGE UP (ref 150–400)
POTASSIUM SERPL-MCNC: 4.9 MMOL/L — SIGNIFICANT CHANGE UP (ref 3.5–5.3)
POTASSIUM SERPL-SCNC: 4.9 MMOL/L — SIGNIFICANT CHANGE UP (ref 3.5–5.3)
RBC # BLD: 3.77 M/UL — LOW (ref 4.2–5.8)
RBC # FLD: 20.8 % — HIGH (ref 10.3–14.5)
SODIUM SERPL-SCNC: 140 MMOL/L — SIGNIFICANT CHANGE UP (ref 135–145)
TACROLIMUS SERPL-MCNC: 9.2 NG/ML — SIGNIFICANT CHANGE UP
WBC # BLD: 4.78 K/UL — SIGNIFICANT CHANGE UP (ref 3.8–10.5)
WBC # FLD AUTO: 4.78 K/UL — SIGNIFICANT CHANGE UP (ref 3.8–10.5)

## 2020-10-02 PROCEDURE — 99233 SBSQ HOSP IP/OBS HIGH 50: CPT

## 2020-10-02 PROCEDURE — 99238 HOSP IP/OBS DSCHRG MGMT 30/<: CPT

## 2020-10-02 PROCEDURE — 99231 SBSQ HOSP IP/OBS SF/LOW 25: CPT

## 2020-10-02 RX ORDER — DRONABINOL 2.5 MG
1 CAPSULE ORAL
Qty: 0 | Refills: 0 | DISCHARGE
Start: 2020-10-02

## 2020-10-02 RX ORDER — ENOXAPARIN SODIUM 100 MG/ML
14 INJECTION SUBCUTANEOUS
Qty: 0 | Refills: 0 | DISCHARGE

## 2020-10-02 RX ORDER — FUROSEMIDE 40 MG
20 TABLET ORAL DAILY
Refills: 0 | Status: DISCONTINUED | OUTPATIENT
Start: 2020-10-03 | End: 2020-10-02

## 2020-10-02 RX ORDER — ASPIRIN/CALCIUM CARB/MAGNESIUM 324 MG
81 TABLET ORAL DAILY
Refills: 0 | Status: DISCONTINUED | OUTPATIENT
Start: 2020-10-02 | End: 2020-10-02

## 2020-10-02 RX ORDER — TACROLIMUS 5 MG/1
1 CAPSULE ORAL
Qty: 0 | Refills: 0 | DISCHARGE
Start: 2020-10-02

## 2020-10-02 RX ORDER — ACETAMINOPHEN 500 MG
2 TABLET ORAL
Qty: 0 | Refills: 0 | DISCHARGE
Start: 2020-10-02

## 2020-10-02 RX ORDER — FUROSEMIDE 40 MG
1 TABLET ORAL
Qty: 0 | Refills: 0 | DISCHARGE
Start: 2020-10-02

## 2020-10-02 RX ORDER — INFLUENZA VIRUS VACCINE 15; 15; 15; 15 UG/.5ML; UG/.5ML; UG/.5ML; UG/.5ML
0.5 SUSPENSION INTRAMUSCULAR ONCE
Refills: 0 | Status: COMPLETED | OUTPATIENT
Start: 2020-10-02 | End: 2020-10-02

## 2020-10-02 RX ORDER — VANCOMYCIN HCL 1 G
125 VIAL (EA) INTRAVENOUS DAILY
Refills: 0 | Status: DISCONTINUED | OUTPATIENT
Start: 2020-10-03 | End: 2020-10-02

## 2020-10-02 RX ADMIN — Medication 5 MILLIGRAM(S): at 06:19

## 2020-10-02 RX ADMIN — Medication 125 MILLIGRAM(S): at 06:20

## 2020-10-02 RX ADMIN — Medication 81 MILLIGRAM(S): at 11:37

## 2020-10-02 RX ADMIN — Medication 40 MILLIGRAM(S): at 06:19

## 2020-10-02 RX ADMIN — PANTOPRAZOLE SODIUM 40 MILLIGRAM(S): 20 TABLET, DELAYED RELEASE ORAL at 06:19

## 2020-10-02 RX ADMIN — Medication 3 UNIT(S): at 11:55

## 2020-10-02 RX ADMIN — TACROLIMUS 5 MILLIGRAM(S): 5 CAPSULE ORAL at 09:42

## 2020-10-02 RX ADMIN — CHLORHEXIDINE GLUCONATE 1 APPLICATION(S): 213 SOLUTION TOPICAL at 08:28

## 2020-10-02 RX ADMIN — Medication 10 MILLIGRAM(S): at 08:02

## 2020-10-02 RX ADMIN — HEPARIN SODIUM 5000 UNIT(S): 5000 INJECTION INTRAVENOUS; SUBCUTANEOUS at 06:19

## 2020-10-02 RX ADMIN — Medication 3 UNIT(S): at 08:02

## 2020-10-02 RX ADMIN — INFLUENZA VIRUS VACCINE 0.5 MILLILITER(S): 15; 15; 15; 15 SUSPENSION INTRAMUSCULAR at 12:07

## 2020-10-02 NOTE — DISCHARGE NOTE PROVIDER - NSDCFUADDINST_GEN_ALL_CORE_FT
Resume activity as tolerated  Resume low cholesterol low sodium diet  Shower daily and wash all incisions with soap and water  Ambulate at least four times daily  Use incentive spirometer every hour during the day  Record daily weight and report changes greater than 3lbs  Please follow up with your cardiologist in 7-10 days

## 2020-10-02 NOTE — PROGRESS NOTE ADULT - SUBJECTIVE AND OBJECTIVE BOX
INFECTIOUS DISEASES FOLLOW UP-- Sujey Lujan  [phone]    This is a follow up note for this  70yMale with  Anemia, C.diff colitis  seen prior to discharge  appetite improved/baseline        ROS:  CONSTITUTIONAL:  No fever, good appetite  CARDIOVASCULAR:  No chest pain or palpitations  RESPIRATORY:  No dyspnea  GASTROINTESTINAL:  No nausea, vomiting, diarrhea, or abdominal pain  GENITOURINARY:  No dysuria  NEUROLOGIC:  No headache,     Allergies    No Known Allergies    Intolerances        ANTIBIOTICS/RELEVANT:  antimicrobials    immunologic:  tacrolimus 5 milliGRAM(s) Oral <User Schedule>    OTHER:  acetaminophen   Tablet .. 650 milliGRAM(s) Oral every 6 hours PRN  aspirin enteric coated 81 milliGRAM(s) Oral daily  chlorhexidine 2% Cloths 1 Application(s) Topical <User Schedule>  dextrose 40% Gel 15 Gram(s) Oral once PRN  dextrose 5%. 1000 milliLiter(s) IV Continuous <Continuous>  dextrose 50% Injectable 12.5 Gram(s) IV Push once  dextrose 50% Injectable 25 Gram(s) IV Push once  dextrose 50% Injectable 25 Gram(s) IV Push once  dronabinol 5 milliGRAM(s) Oral <User Schedule>  glucagon  Injectable 1 milliGRAM(s) IntraMuscular once PRN  heparin   Injectable 5000 Unit(s) SubCutaneous every 8 hours  insulin lispro (HumaLOG) corrective regimen sliding scale   SubCutaneous three times a day before meals  insulin lispro (HumaLOG) corrective regimen sliding scale   SubCutaneous at bedtime  insulin lispro Injectable (HumaLOG) 3 Unit(s) SubCutaneous three times a day before meals  pantoprazole    Tablet 40 milliGRAM(s) Oral before breakfast  pravastatin 20 milliGRAM(s) Oral at bedtime  predniSONE   Tablet 10 milliGRAM(s) Oral <User Schedule>  sodium chloride 0.9% lock flush 10 milliLiter(s) IV Push every 1 hour PRN  sodium chloride 0.9% lock flush 3 milliLiter(s) IV Push every 8 hours      Objective:  Vital Signs Last 24 Hrs  T(C): 36.6 (02 Oct 2020 11:23), Max: 36.7 (01 Oct 2020 20:33)  T(F): 97.9 (02 Oct 2020 11:23), Max: 98.1 (01 Oct 2020 20:33)  HR: 104 (02 Oct 2020 11:23) (102 - 125)  BP: 103/70 (02 Oct 2020 11:23) (103/70 - 119/82)  BP(mean): 81 (02 Oct 2020 11:23) (81 - 103)  RR: 18 (02 Oct 2020 11:23) (18 - 18)  SpO2: 100% (02 Oct 2020 11:23) (96% - 100%)    PHYSICAL EXAM:  Constitutional:no acute distress  Eyes:WALT, EOMI  Ear/Nose/Throat: no oral lesions, 	  Respiratory: clear BL  Cardiovascular: S1S2  Gastrointestinal:soft, (+) BS, no tenderness  Extremities:no e/e/c  No Lymphadenopathy  IV sites not inflammed.    LABS:                        12.2   4.78  )-----------( 324      ( 02 Oct 2020 10:00 )             40.3     10-02    140  |  103  |  25<H>  ----------------------------<  100<H>  4.9   |  23  |  1.24    Ca    9.5      02 Oct 2020 10:00  Phos  3.8     10-01            MICROBIOLOGY:            RECENT CULTURES:      RADIOLOGY & ADDITIONAL STUDIES:

## 2020-10-02 NOTE — PROGRESS NOTE ADULT - NSTELEHEALTH_GEN_ALL_CORE
No

## 2020-10-02 NOTE — PROGRESS NOTE ADULT - NUTRITIONAL ASSESSMENT
This patient has been assessed with a concern for Malnutrition and has been determined to have a diagnosis/diagnoses of Severe protein-calorie malnutrition.    This patient is being managed with:   Diet Regular-  No Concentrated Potassium  Supplement Feeding Modality:  Oral  Glucerna Shake Cans or Servings Per Day:  2       Frequency:  Daily  Entered: Sep 27 2020  7:51AM    
This patient has been assessed with a concern for Malnutrition and has been determined to have a diagnosis/diagnoses of Severe protein-calorie malnutrition.    This patient is being managed with:   Diet Consistent Carbohydrate/No Snacks-  Fiber/Residue Restricted (LOWFIBER)  Tube Feeding Modality: Nasogastric  Glucerna 1.5 Sohail (GLUCERNA1.5RTH)  Total Volume for 24 Hours (mL): 960  Continuous  Starting Tube Feed Rate {mL per Hour}: 40  Until Goal Tube Feed Rate (mL per Hour): 40  Tube Feed Duration (in Hours): 24  Tube Feed Start Time: 11:45  Supplement Feeding Modality:  Oral  Glucerna Shake Cans or Servings Per Day:  2       Frequency:  Daily  Entered: Sep 14 2020 11:48AM    
This patient has been assessed with a concern for Malnutrition and has been determined to have a diagnosis/diagnoses of Severe protein-calorie malnutrition.    This patient is being managed with:   Diet NPO-  Except Medications  Entered: Aug 27 2020  8:17PM
This patient has been assessed with a concern for Malnutrition and has been determined to have a diagnosis/diagnoses of Severe protein-calorie malnutrition.    This patient is being managed with:   Diet Clear Liquid-  Consistent Carbohydrate {No Snacks} (CSTCHO)  Low Sodium  Tube Feeding Modality: Nasogastric  Glucerna 1.5 Sohail (GLUCERNA1.5RTH)  Total Volume for 24 Hours (mL): 240  Continuous  Starting Tube Feed Rate {mL per Hour}: 10  Until Goal Tube Feed Rate (mL per Hour): 10  Tube Feed Duration (in Hours): 24  Tube Feed Start Time: 11:10  Entered: Sep  8 2020  1:35PM
This patient has been assessed with a concern for Malnutrition and has been determined to have a diagnosis/diagnoses of Severe protein-calorie malnutrition.    This patient is being managed with:   Diet Clear Liquid-  Entered: Sep  5 2020 11:34AM
This patient has been assessed with a concern for Malnutrition and has been determined to have a diagnosis/diagnoses of Severe protein-calorie malnutrition.    This patient is being managed with:   Diet Clear Liquid-  Low Sodium  Tube Feeding Modality: Nasogastric  Glucerna 1.5 Sohail (GLUCERNA1.5RTH)  Total Volume for 24 Hours (mL): 240  Continuous  Starting Tube Feed Rate {mL per Hour}: 10  Until Goal Tube Feed Rate (mL per Hour): 10  Tube Feed Duration (in Hours): 24  Tube Feed Start Time: 11:10  Entered: Sep  6 2020 11:03AM
This patient has been assessed with a concern for Malnutrition and has been determined to have a diagnosis/diagnoses of Severe protein-calorie malnutrition.    This patient is being managed with:   Diet Consistent Carbohydrate/No Snacks-  Fiber/Residue Restricted (LOWFIBER)  Tube Feeding Modality: Nasogastric  Glucerna 1.5 Sohail (GLUCERNA1.5RTH)  Total Volume for 24 Hours (mL): 960  Continuous  Starting Tube Feed Rate {mL per Hour}: 40  Until Goal Tube Feed Rate (mL per Hour): 40  Tube Feed Duration (in Hours): 24  Tube Feed Start Time: 11:45  Supplement Feeding Modality:  Oral  Glucerna Shake Cans or Servings Per Day:  2       Frequency:  Daily  Entered: Sep 14 2020 11:48AM    
This patient has been assessed with a concern for Malnutrition and has been determined to have a diagnosis/diagnoses of Severe protein-calorie malnutrition.    This patient is being managed with:   Diet Consistent Carbohydrate/No Snacks-  Fiber/Residue Restricted (LOWFIBER)  Tube Feeding Modality: Nasogastric  Glucerna 1.5 Sohail (GLUCERNA1.5RTH)  Total Volume for 24 Hours (mL): 960  Continuous  Until Goal Tube Feed Rate (mL per Hour): 80  Tube Feed Duration (in Hours): 12  Tube Feed Start Time: 18:00  Supplement Feeding Modality:  Oral  Glucerna Shake Cans or Servings Per Day:  2       Frequency:  Daily  Entered: Sep 16 2020  7:36AM    
This patient has been assessed with a concern for Malnutrition and has been determined to have a diagnosis/diagnoses of Severe protein-calorie malnutrition.    This patient is being managed with:   Diet Full Liquid-  Consistent Carbohydrate {No Snacks} (CSTCHO)  Tube Feeding Modality: Nasogastric  Glucerna 1.5 Sohail (GLUCERNA1.5RTH)  Total Volume for 24 Hours (mL): 1320  Continuous  Starting Tube Feed Rate {mL per Hour}: 40  Until Goal Tube Feed Rate (mL per Hour): 55  Tube Feed Duration (in Hours): 24  Tube Feed Start Time: 10:00  Entered: Sep 12 2020 10:09AM    
This patient has been assessed with a concern for Malnutrition and has been determined to have a diagnosis/diagnoses of Severe protein-calorie malnutrition.    This patient is being managed with:   Diet Full Liquid-  Consistent Carbohydrate {No Snacks} (CSTCHO)  Tube Feeding Modality: Nasogastric  Glucerna 1.5 Sohail (GLUCERNA1.5RTH)  Total Volume for 24 Hours (mL): 480  Continuous  Starting Tube Feed Rate {mL per Hour}: 20  Until Goal Tube Feed Rate (mL per Hour): 20  Tube Feed Duration (in Hours): 24  Tube Feed Start Time: 11:00  Entered: Sep 10 2020 10:38AM
This patient has been assessed with a concern for Malnutrition and has been determined to have a diagnosis/diagnoses of Severe protein-calorie malnutrition.    This patient is being managed with:   Diet Full Liquid-  Consistent Carbohydrate {No Snacks} (CSTCHO)  Tube Feeding Modality: Nasogastric  Glucerna 1.5 Sohail (GLUCERNA1.5RTH)  Total Volume for 24 Hours (mL): 480  Continuous  Starting Tube Feed Rate {mL per Hour}: 20  Until Goal Tube Feed Rate (mL per Hour): 20  Tube Feed Duration (in Hours): 24  Tube Feed Start Time: 11:00  Entered: Sep 10 2020 10:38AM
This patient has been assessed with a concern for Malnutrition and has been determined to have a diagnosis/diagnoses of Severe protein-calorie malnutrition.    This patient is being managed with:   Diet NPO-  Except Medications  Entered: Aug 27 2020  8:17PM
This patient has been assessed with a concern for Malnutrition and has been determined to have a diagnosis/diagnoses of Severe protein-calorie malnutrition.    This patient is being managed with:   Diet NPO-  Except Medications  Entered: Aug 27 2020  8:17PM      This patient has been assessed with a concern for Malnutrition and has been determined to have a diagnosis/diagnoses of Severe protein-calorie malnutrition.    This patient is being managed with:   Diet NPO-  Except Medications  Entered: Aug 27 2020  8:17PM
This patient has been assessed with a concern for Malnutrition and has been determined to have a diagnosis/diagnoses of Severe protein-calorie malnutrition.    This patient is being managed with:   Diet Clear Liquid-  Entered: Sep  5 2020 11:34AM
This patient has been assessed with a concern for Malnutrition and has been determined to have a diagnosis/diagnoses of Severe protein-calorie malnutrition.    This patient is being managed with:   Diet Clear Liquid-  Low Sodium  Tube Feeding Modality: Nasogastric  Glucerna 1.5 Sohail (GLUCERNA1.5RTH)  Total Volume for 24 Hours (mL): 240  Continuous  Starting Tube Feed Rate {mL per Hour}: 10  Until Goal Tube Feed Rate (mL per Hour): 10  Tube Feed Duration (in Hours): 24  Tube Feed Start Time: 11:10  Entered: Sep  6 2020 11:03AM
This patient has been assessed with a concern for Malnutrition and has been determined to have a diagnosis/diagnoses of Severe protein-calorie malnutrition.    This patient is being managed with:   Diet Consistent Carbohydrate/No Snacks-  Fiber/Residue Restricted (LOWFIBER)  Tube Feeding Modality: Nasogastric  Glucerna 1.5 Sohail (GLUCERNA1.5RTH)  Total Volume for 24 Hours (mL): 960  Continuous  Until Goal Tube Feed Rate (mL per Hour): 80  Tube Feed Duration (in Hours): 12  Tube Feed Start Time: 18:00  Supplement Feeding Modality:  Oral  Glucerna Shake Cans or Servings Per Day:  2       Frequency:  Daily  Entered: Sep 16 2020  7:36AM    
This patient has been assessed with a concern for Malnutrition and has been determined to have a diagnosis/diagnoses of Severe protein-calorie malnutrition.    This patient is being managed with:   Diet Consistent Carbohydrate/No Snacks-  Fiber/Residue Restricted (LOWFIBER)  Tube Feeding Modality: Nasogastric  Glucerna 1.5 Sohail (GLUCERNA1.5RTH)  Total Volume for 24 Hours (mL): 960  Continuous  Until Goal Tube Feed Rate (mL per Hour): 80  Tube Feed Duration (in Hours): 12  Tube Feed Start Time: 18:00  Supplement Feeding Modality:  Oral  Glucerna Shake Cans or Servings Per Day:  2       Frequency:  Daily  Entered: Sep 16 2020  7:36AM    
This patient has been assessed with a concern for Malnutrition and has been determined to have a diagnosis/diagnoses of Severe protein-calorie malnutrition.    This patient is being managed with:   Diet Consistent Carbohydrate/No Snacks-  Fiber/Residue Restricted (LOWFIBER)  Tube Feeding Modality: Nasogastric  Glucerna 1.5 Sohail (GLUCERNA1.5RTH)  Total Volume for 24 Hours (mL): 960  Continuous  Until Goal Tube Feed Rate (mL per Hour): 80  Tube Feed Duration (in Hours): 12  Tube Feed Start Time: 18:00  Supplement Feeding Modality:  Oral  Glucerna Shake Cans or Servings Per Day:  3       Frequency:  Daily  Entered: Sep 18 2020  9:22AM    
This patient has been assessed with a concern for Malnutrition and has been determined to have a diagnosis/diagnoses of Severe protein-calorie malnutrition.    This patient is being managed with:   Diet Consistent Carbohydrate/No Snacks-  Fiber/Residue Restricted (LOWFIBER)  Tube Feeding Modality: Nasogastric  Glucerna 1.5 Sohail (GLUCERNA1.5RTH)  Total Volume for 24 Hours (mL): 960  Continuous  Until Goal Tube Feed Rate (mL per Hour): 80  Tube Feed Duration (in Hours): 12  Tube Feed Start Time: 18:00  Supplement Feeding Modality:  Oral  Glucerna Shake Cans or Servings Per Day:  3       Frequency:  Daily  Entered: Sep 18 2020  9:22AM    
This patient has been assessed with a concern for Malnutrition and has been determined to have a diagnosis/diagnoses of Severe protein-calorie malnutrition.    This patient is being managed with:   Diet Full Liquid-  Consistent Carbohydrate {No Snacks} (CSTCHO)  Tube Feeding Modality: Nasogastric  Glucerna 1.5 Sohail (GLUCERNA1.5RTH)  Total Volume for 24 Hours (mL): 1320  Continuous  Starting Tube Feed Rate {mL per Hour}: 40  Until Goal Tube Feed Rate (mL per Hour): 55  Tube Feed Duration (in Hours): 24  Tube Feed Start Time: 10:00  Entered: Sep 12 2020 10:09AM    
This patient has been assessed with a concern for Malnutrition and has been determined to have a diagnosis/diagnoses of Severe protein-calorie malnutrition.    This patient is being managed with:   Diet Full Liquid-  Consistent Carbohydrate {No Snacks} (CSTCHO)  Tube Feeding Modality: Nasogastric  Glucerna 1.5 Sohail (GLUCERNA1.5RTH)  Total Volume for 24 Hours (mL): 1320  Continuous  Starting Tube Feed Rate {mL per Hour}: 40  Until Goal Tube Feed Rate (mL per Hour): 55  Tube Feed Duration (in Hours): 24  Tube Feed Start Time: 10:00  Entered: Sep 12 2020 10:09AM    
This patient has been assessed with a concern for Malnutrition and has been determined to have a diagnosis/diagnoses of Severe protein-calorie malnutrition.    This patient is being managed with:   Diet Full Liquid-  Consistent Carbohydrate {No Snacks} (CSTCHO)  Tube Feeding Modality: Nasogastric  Glucerna 1.5 Sohail (GLUCERNA1.5RTH)  Total Volume for 24 Hours (mL): 480  Continuous  Starting Tube Feed Rate {mL per Hour}: 20  Until Goal Tube Feed Rate (mL per Hour): 20  Tube Feed Duration (in Hours): 24  Tube Feed Start Time: 11:00  Entered: Sep 10 2020 10:38AM
This patient has been assessed with a concern for Malnutrition and has been determined to have a diagnosis/diagnoses of Severe protein-calorie malnutrition.    This patient is being managed with:   Diet NPO-  Except Medications  Entered: Aug 27 2020  8:17PM
This patient has been assessed with a concern for Malnutrition and has been determined to have a diagnosis/diagnoses of Severe protein-calorie malnutrition.    This patient is being managed with:   Diet Clear Liquid-  Consistent Carbohydrate {No Snacks} (CSTCHO)  Low Sodium  Tube Feeding Modality: Nasogastric  Glucerna 1.5 Sohail (GLUCERNA1.5RTH)  Total Volume for 24 Hours (mL): 240  Continuous  Starting Tube Feed Rate {mL per Hour}: 10  Until Goal Tube Feed Rate (mL per Hour): 10  Tube Feed Duration (in Hours): 24  Tube Feed Start Time: 11:10  Entered: Sep  8 2020  1:35PM
This patient has been assessed with a concern for Malnutrition and has been determined to have a diagnosis/diagnoses of Severe protein-calorie malnutrition.    This patient is being managed with:   Diet Clear Liquid-  Low Sodium  Tube Feeding Modality: Nasogastric  Glucerna 1.5 Sohail (GLUCERNA1.5RTH)  Total Volume for 24 Hours (mL): 240  Continuous  Starting Tube Feed Rate {mL per Hour}: 10  Until Goal Tube Feed Rate (mL per Hour): 10  Tube Feed Duration (in Hours): 24  Tube Feed Start Time: 11:10  Entered: Sep  6 2020 11:03AM
This patient has been assessed with a concern for Malnutrition and has been determined to have a diagnosis/diagnoses of Severe protein-calorie malnutrition.    This patient is being managed with:   Diet Consistent Carbohydrate/No Snacks-  Fiber/Residue Restricted (LOWFIBER)  Tube Feeding Modality: Nasogastric  Glucerna 1.5 Sohail (GLUCERNA1.5RTH)  Total Volume for 24 Hours (mL): 960  Continuous  Starting Tube Feed Rate {mL per Hour}: 40  Until Goal Tube Feed Rate (mL per Hour): 40  Tube Feed Duration (in Hours): 24  Tube Feed Start Time: 11:45  Supplement Feeding Modality:  Oral  Glucerna Shake Cans or Servings Per Day:  2       Frequency:  Daily  Entered: Sep 14 2020 11:48AM    
This patient has been assessed with a concern for Malnutrition and has been determined to have a diagnosis/diagnoses of Severe protein-calorie malnutrition.    This patient is being managed with:   Diet Consistent Carbohydrate/No Snacks-  Fiber/Residue Restricted (LOWFIBER)  Tube Feeding Modality: Nasogastric  Glucerna 1.5 Sohail (GLUCERNA1.5RTH)  Total Volume for 24 Hours (mL): 960  Continuous  Starting Tube Feed Rate {mL per Hour}: 40  Until Goal Tube Feed Rate (mL per Hour): 40  Tube Feed Duration (in Hours): 24  Tube Feed Start Time: 11:45  Supplement Feeding Modality:  Oral  Glucerna Shake Cans or Servings Per Day:  2       Frequency:  Daily  Entered: Sep 14 2020 11:48AM    
This patient has been assessed with a concern for Malnutrition and has been determined to have a diagnosis/diagnoses of Severe protein-calorie malnutrition.    This patient is being managed with:   Diet Consistent Carbohydrate/No Snacks-  Fiber/Residue Restricted (LOWFIBER)  Tube Feeding Modality: Nasogastric  Glucerna 1.5 Sohail (GLUCERNA1.5RTH)  Total Volume for 24 Hours (mL): 960  Continuous  Until Goal Tube Feed Rate (mL per Hour): 80  Tube Feed Duration (in Hours): 12  Tube Feed Start Time: 18:00  Supplement Feeding Modality:  Oral  Glucerna Shake Cans or Servings Per Day:  2       Frequency:  Daily  Entered: Sep 16 2020  7:36AM    
This patient has been assessed with a concern for Malnutrition and has been determined to have a diagnosis/diagnoses of Severe protein-calorie malnutrition.    This patient is being managed with:   Diet Consistent Carbohydrate/No Snacks-  Fiber/Residue Restricted (LOWFIBER)  Tube Feeding Modality: Nasogastric  Glucerna 1.5 Sohail (GLUCERNA1.5RTH)  Total Volume for 24 Hours (mL): 960  Continuous  Until Goal Tube Feed Rate (mL per Hour): 80  Tube Feed Duration (in Hours): 12  Tube Feed Start Time: 18:00  Supplement Feeding Modality:  Oral  Glucerna Shake Cans or Servings Per Day:  2       Frequency:  Daily  Entered: Sep 16 2020  7:36AM    
This patient has been assessed with a concern for Malnutrition and has been determined to have a diagnosis/diagnoses of Severe protein-calorie malnutrition.    This patient is being managed with:   Diet Consistent Carbohydrate/No Snacks-  Fiber/Residue Restricted (LOWFIBER)  Tube Feeding Modality: Nasogastric  Glucerna 1.5 Sohail (GLUCERNA1.5RTH)  Total Volume for 24 Hours (mL): 960  Continuous  Until Goal Tube Feed Rate (mL per Hour): 80  Tube Feed Duration (in Hours): 12  Tube Feed Start Time: 18:00  Supplement Feeding Modality:  Oral  Glucerna Shake Cans or Servings Per Day:  3       Frequency:  Daily  Entered: Sep 18 2020  9:22AM    
This patient has been assessed with a concern for Malnutrition and has been determined to have a diagnosis/diagnoses of Severe protein-calorie malnutrition.    This patient is being managed with:   Diet Full Liquid-  Consistent Carbohydrate {No Snacks} (CSTCHO)  Tube Feeding Modality: Nasogastric  Glucerna 1.5 Sohail (GLUCERNA1.5RTH)  Total Volume for 24 Hours (mL): 480  Continuous  Starting Tube Feed Rate {mL per Hour}: 20  Until Goal Tube Feed Rate (mL per Hour): 20  Tube Feed Duration (in Hours): 24  Tube Feed Start Time: 11:00  Entered: Sep 10 2020 10:38AM
This patient has been assessed with a concern for Malnutrition and has been determined to have a diagnosis/diagnoses of Severe protein-calorie malnutrition.    This patient is being managed with:   Diet Full Liquid-  Consistent Carbohydrate {No Snacks} (CSTCHO)  Tube Feeding Modality: Nasogastric  Glucerna 1.5 Sohail (GLUCERNA1.5RTH)  Total Volume for 24 Hours (mL): 480  Continuous  Starting Tube Feed Rate {mL per Hour}: 20  Until Goal Tube Feed Rate (mL per Hour): 20  Tube Feed Duration (in Hours): 24  Tube Feed Start Time: 11:00  Entered: Sep 10 2020 10:38AM
This patient has been assessed with a concern for Malnutrition and has been determined to have a diagnosis/diagnoses of Severe protein-calorie malnutrition.    This patient is being managed with:   Diet NPO-  Except Medications  Entered: Aug 27 2020  8:17PM
This patient has been assessed with a concern for Malnutrition and has been determined to have a diagnosis/diagnoses of Severe protein-calorie malnutrition.    This patient is being managed with:   Diet Regular-  No Concentrated Potassium  Supplement Feeding Modality:  Oral  Glucerna Shake Cans or Servings Per Day:  2       Frequency:  Daily  Entered: Sep 27 2020  7:51AM    
This patient has been assessed with a concern for Malnutrition and has been determined to have a diagnosis/diagnoses of Severe protein-calorie malnutrition.    This patient is being managed with:   Diet Consistent Carbohydrate/No Snacks-  Fiber/Residue Restricted (LOWFIBER)  Tube Feeding Modality: Nasogastric  Glucerna 1.5 Sohail (GLUCERNA1.5RTH)  Total Volume for 24 Hours (mL): 960  Continuous  Until Goal Tube Feed Rate (mL per Hour): 80  Tube Feed Duration (in Hours): 12  Tube Feed Start Time: 18:00  Supplement Feeding Modality:  Oral  Glucerna Shake Cans or Servings Per Day:  2       Frequency:  Daily  Entered: Sep 16 2020  7:36AM    
This patient has been assessed with a concern for Malnutrition and has been determined to have a diagnosis/diagnoses of Severe protein-calorie malnutrition.    This patient is being managed with:   Diet Consistent Carbohydrate/No Snacks-  Fiber/Residue Restricted (LOWFIBER)  Tube Feeding Modality: Nasogastric  Glucerna 1.5 Sohail (GLUCERNA1.5RTH)  Total Volume for 24 Hours (mL): 960  Continuous  Until Goal Tube Feed Rate (mL per Hour): 80  Tube Feed Duration (in Hours): 12  Tube Feed Start Time: 18:00  Supplement Feeding Modality:  Oral  Glucerna Shake Cans or Servings Per Day:  2       Frequency:  Daily  Entered: Sep 16 2020  7:36AM    
This patient has been assessed with a concern for Malnutrition and has been determined to have a diagnosis/diagnoses of Severe protein-calorie malnutrition.    This patient is being managed with:   Diet Consistent Carbohydrate/No Snacks-  Fiber/Residue Restricted (LOWFIBER)  Tube Feeding Modality: Nasogastric  Glucerna 1.5 Sohail (GLUCERNA1.5RTH)  Total Volume for 24 Hours (mL): 960  Continuous  Until Goal Tube Feed Rate (mL per Hour): 80  Tube Feed Duration (in Hours): 12  Tube Feed Start Time: 18:00  Supplement Feeding Modality:  Oral  Glucerna Shake Cans or Servings Per Day:  3       Frequency:  Daily  Entered: Sep 18 2020  9:22AM    
This patient has been assessed with a concern for Malnutrition and has been determined to have a diagnosis/diagnoses of Severe protein-calorie malnutrition.    This patient is being managed with:   Diet NPO-  Except Medications  Entered: Aug 27 2020  8:17PM
This patient has been assessed with a concern for Malnutrition and has been determined to have a diagnosis/diagnoses of Severe protein-calorie malnutrition.    This patient is being managed with:   Diet Regular-  No Concentrated Potassium  Supplement Feeding Modality:  Oral  Glucerna Shake Cans or Servings Per Day:  2       Frequency:  Daily  Entered: Sep 27 2020  7:51AM    
This patient has been assessed with a concern for Malnutrition and has been determined to have a diagnosis/diagnoses of Severe protein-calorie malnutrition.    This patient is being managed with:   Diet Regular-  No Concentrated Potassium  Supplement Feeding Modality:  Oral  Glucerna Shake Cans or Servings Per Day:  2       Frequency:  Daily  Entered: Sep 27 2020  7:51AM    
This patient has been assessed with a concern for Malnutrition and has been determined to have a diagnosis/diagnoses of Severe protein-calorie malnutrition.    This patient is being managed with:   Diet NPO-  Except Medications  TPN
This patient has been assessed with a concern for Malnutrition and has been determined to have a diagnosis/diagnoses of Severe protein-calorie malnutrition.    This patient is being managed with:   Diet Consistent Carbohydrate/No Snacks-  Fiber/Residue Restricted (LOWFIBER)  Tube Feeding Modality: Nasogastric  Glucerna 1.5 Sohail (GLUCERNA1.5RTH)  Total Volume for 24 Hours (mL): 960  Continuous  Until Goal Tube Feed Rate (mL per Hour): 80  Tube Feed Duration (in Hours): 12  Tube Feed Start Time: 18:00  Supplement Feeding Modality:  Oral  Glucerna Shake Cans or Servings Per Day:  2       Frequency:  Daily  Entered: Sep 16 2020  7:36AM    
This patient has been assessed with a concern for Malnutrition and has been determined to have a diagnosis/diagnoses of Severe protein-calorie malnutrition.    This patient is being managed with:   Diet Consistent Carbohydrate/No Snacks-  Fiber/Residue Restricted (LOWFIBER)  Tube Feeding Modality: Nasogastric  Glucerna 1.5 Sohail (GLUCERNA1.5RTH)  Total Volume for 24 Hours (mL): 960  Continuous  Until Goal Tube Feed Rate (mL per Hour): 80  Tube Feed Duration (in Hours): 12  Tube Feed Start Time: 18:00  Supplement Feeding Modality:  Oral  Glucerna Shake Cans or Servings Per Day:  2       Frequency:  Daily  Entered: Sep 16 2020  7:36AM    
This patient has been assessed with a concern for Malnutrition and has been determined to have a diagnosis/diagnoses of Severe protein-calorie malnutrition.    This patient is being managed with:   Diet Consistent Carbohydrate/No Snacks-  Fiber/Residue Restricted (LOWFIBER)  Tube Feeding Modality: Nasogastric  Glucerna 1.5 Sohail (GLUCERNA1.5RTH)  Total Volume for 24 Hours (mL): 960  Continuous  Until Goal Tube Feed Rate (mL per Hour): 80  Tube Feed Duration (in Hours): 12  Tube Feed Start Time: 18:00  Supplement Feeding Modality:  Oral  Glucerna Shake Cans or Servings Per Day:  3       Frequency:  Daily  Entered: Sep 18 2020  9:22AM    
This patient has been assessed with a concern for Malnutrition and has been determined to have a diagnosis/diagnoses of Severe protein-calorie malnutrition.    This patient is being managed with:   Diet Full Liquid-  Consistent Carbohydrate {No Snacks} (CSTCHO)  Tube Feeding Modality: Nasogastric  Glucerna 1.5 Sohail (GLUCERNA1.5RTH)  Total Volume for 24 Hours (mL): 1320  Continuous  Starting Tube Feed Rate {mL per Hour}: 40  Until Goal Tube Feed Rate (mL per Hour): 55  Tube Feed Duration (in Hours): 24  Tube Feed Start Time: 10:00  Entered: Sep 12 2020 10:09AM    
This patient has been assessed with a concern for Malnutrition and has been determined to have a diagnosis/diagnoses of Severe protein-calorie malnutrition.    This patient is being managed with:   Diet NPO-  Except Medications  Entered: Aug 27 2020  8:17PM
This patient has been assessed with a concern for Malnutrition and has been determined to have a diagnosis/diagnoses of Severe protein-calorie malnutrition.    This patient is being managed with:   Diet Regular-  No Concentrated Potassium  Supplement Feeding Modality:  Oral  Glucerna Shake Cans or Servings Per Day:  2       Frequency:  Daily  Entered: Sep 27 2020  7:51AM    
This patient has been assessed with a concern for Malnutrition and has been determined to have a diagnosis/diagnoses of Severe protein-calorie malnutrition.    This patient is being managed with:   Diet Clear Liquid-  Entered: Sep  5 2020 11:34AM
This patient has been assessed with a concern for Malnutrition and has been determined to have a diagnosis/diagnoses of Severe protein-calorie malnutrition.    This patient is being managed with:   Diet Consistent Carbohydrate/No Snacks-  Fiber/Residue Restricted (LOWFIBER)  Tube Feeding Modality: Nasogastric  Glucerna 1.5 Sohail (GLUCERNA1.5RTH)  Total Volume for 24 Hours (mL): 960  Continuous  Until Goal Tube Feed Rate (mL per Hour): 80  Tube Feed Duration (in Hours): 12  Tube Feed Start Time: 18:00  Supplement Feeding Modality:  Oral  Glucerna Shake Cans or Servings Per Day:  3       Frequency:  Daily  Entered: Sep 18 2020  9:22AM    
This patient has been assessed with a concern for Malnutrition and has been determined to have a diagnosis/diagnoses of Severe protein-calorie malnutrition.    This patient is being managed with:   Diet NPO-  Except Medications  Entered: Aug 27 2020  8:17PM
This patient has been assessed with a concern for Malnutrition and has been determined to have a diagnosis/diagnoses of Severe protein-calorie malnutrition.    This patient is being managed with:   Diet NPO-  Except Medications  Entered: Aug 27 2020  8:17PM
This patient has been assessed with a concern for Malnutrition and has been determined to have a diagnosis/diagnoses of Severe protein-calorie malnutrition.    This patient is being managed with:   Diet Regular-  No Concentrated Potassium  Supplement Feeding Modality:  Oral  Glucerna Shake Cans or Servings Per Day:  2       Frequency:  Daily  Entered: Sep 27 2020  7:51AM    
This patient has been assessed with a concern for Malnutrition and has been determined to have a diagnosis/diagnoses of Severe protein-calorie malnutrition. He is currently on TPN.
This patient has been assessed with a concern for Malnutrition and has been determined to have a diagnosis/diagnoses of Severe protein-calorie malnutrition.    This patient is being managed with:   Diet Clear Liquid-  Low Sodium  Tube Feeding Modality: Nasogastric  Glucerna 1.5 Sohail (GLUCERNA1.5RTH)  Total Volume for 24 Hours (mL): 240  Continuous  Starting Tube Feed Rate {mL per Hour}: 10  Until Goal Tube Feed Rate (mL per Hour): 10  Tube Feed Duration (in Hours): 24  Tube Feed Start Time: 11:10  Entered: Sep  6 2020 11:03AM
This patient has been assessed with a concern for Malnutrition and has been determined to have a diagnosis/diagnoses of Severe protein-calorie malnutrition.    This patient is being managed with:   Diet Consistent Carbohydrate/No Snacks-  Fiber/Residue Restricted (LOWFIBER)  Tube Feeding Modality: Nasogastric  Glucerna 1.5 Sohail (GLUCERNA1.5RTH)  Total Volume for 24 Hours (mL): 960  Continuous  Until Goal Tube Feed Rate (mL per Hour): 80  Tube Feed Duration (in Hours): 12  Tube Feed Start Time: 18:00  Supplement Feeding Modality:  Oral  Glucerna Shake Cans or Servings Per Day:  3       Frequency:  Daily  Entered: Sep 18 2020  9:22AM    
This patient has been assessed with a concern for Malnutrition and has been determined to have a diagnosis/diagnoses of Severe protein-calorie malnutrition.    This patient is being managed with:   Diet Clear Liquid-  Consistent Carbohydrate {No Snacks} (CSTCHO)  Low Sodium  Tube Feeding Modality: Nasogastric  Glucerna 1.5 Sohail (GLUCERNA1.5RTH)  Total Volume for 24 Hours (mL): 240  Continuous  Starting Tube Feed Rate {mL per Hour}: 10  Until Goal Tube Feed Rate (mL per Hour): 10  Tube Feed Duration (in Hours): 24  Tube Feed Start Time: 11:10  Entered: Sep  8 2020  1:35PM
This patient has been assessed with a concern for Malnutrition and has been determined to have a diagnosis/diagnoses of Severe protein-calorie malnutrition.    This patient is being managed with:   Diet Clear Liquid-  Consistent Carbohydrate {No Snacks} (CSTCHO)  Low Sodium  Tube Feeding Modality: Nasogastric  Glucerna 1.5 Sohail (GLUCERNA1.5RTH)  Total Volume for 24 Hours (mL): 240  Continuous  Starting Tube Feed Rate {mL per Hour}: 10  Until Goal Tube Feed Rate (mL per Hour): 10  Tube Feed Duration (in Hours): 24  Tube Feed Start Time: 11:10  Entered: Sep  8 2020  1:35PM
This patient has been assessed with a concern for Malnutrition and has been determined to have a diagnosis/diagnoses of Severe protein-calorie malnutrition.    This patient is being managed with:   Diet Full Liquid-  Consistent Carbohydrate {No Snacks} (CSTCHO)  Tube Feeding Modality: Nasogastric  Glucerna 1.5 Sohail (GLUCERNA1.5RTH)  Total Volume for 24 Hours (mL): 1320  Continuous  Starting Tube Feed Rate {mL per Hour}: 40  Until Goal Tube Feed Rate (mL per Hour): 55  Tube Feed Duration (in Hours): 24  Tube Feed Start Time: 10:00  Entered: Sep 12 2020 10:09AM    
This patient has been assessed with a concern for Malnutrition and has been determined to have a diagnosis/diagnoses of Severe protein-calorie malnutrition.    This patient is being managed with:   Diet Full Liquid-  Consistent Carbohydrate {No Snacks} (CSTCHO)  Tube Feeding Modality: Nasogastric  Glucerna 1.5 Sohail (GLUCERNA1.5RTH)  Total Volume for 24 Hours (mL): 1320  Continuous  Starting Tube Feed Rate {mL per Hour}: 40  Until Goal Tube Feed Rate (mL per Hour): 55  Tube Feed Duration (in Hours): 24  Tube Feed Start Time: 10:00  Entered: Sep 12 2020 10:09AM    
This patient has been assessed with a concern for Malnutrition and has been determined to have a diagnosis/diagnoses of Severe protein-calorie malnutrition.    This patient is being managed with:   Diet Full Liquid-  Consistent Carbohydrate {No Snacks} (CSTCHO)  Tube Feeding Modality: Nasogastric  Glucerna 1.5 Sohail (GLUCERNA1.5RTH)  Total Volume for 24 Hours (mL): 480  Continuous  Starting Tube Feed Rate {mL per Hour}: 20  Until Goal Tube Feed Rate (mL per Hour): 20  Tube Feed Duration (in Hours): 24  Tube Feed Start Time: 11:00  Entered: Sep 10 2020 10:38AM
This patient has been assessed with a concern for Malnutrition and has been determined to have a diagnosis/diagnoses of Severe protein-calorie malnutrition.    This patient is being managed with:   Diet Regular-  No Concentrated Potassium  Supplement Feeding Modality:  Oral  Glucerna Shake Cans or Servings Per Day:  2       Frequency:  Daily  Entered: Sep 27 2020  7:51AM    
This patient has been assessed with a concern for Malnutrition and has been determined to have a diagnosis/diagnoses of Severe protein-calorie malnutrition.    This patient is being managed with:   Diet Consistent Carbohydrate/No Snacks-  Fiber/Residue Restricted (LOWFIBER)  Tube Feeding Modality: Nasogastric  Glucerna 1.5 Sohail (GLUCERNA1.5RTH)  Total Volume for 24 Hours (mL): 960  Continuous  Until Goal Tube Feed Rate (mL per Hour): 80  Tube Feed Duration (in Hours): 12  Tube Feed Start Time: 18:00  Supplement Feeding Modality:  Oral  Glucerna Shake Cans or Servings Per Day:  3       Frequency:  Daily  Entered: Sep 18 2020  9:22AM    
This patient has been assessed with a concern for Malnutrition and has been determined to have a diagnosis/diagnoses of Severe protein-calorie malnutrition.    This patient is being managed with:   Diet Regular-  No Concentrated Potassium  Supplement Feeding Modality:  Oral  Glucerna Shake Cans or Servings Per Day:  2       Frequency:  Daily  Entered: Sep 27 2020  7:51AM    
This patient has been assessed with a concern for Malnutrition and has been determined to have a diagnosis/diagnoses of Severe protein-calorie malnutrition.    This patient is being managed with:   Diet Regular-  No Concentrated Potassium  Supplement Feeding Modality:  Oral  Glucerna Shake Cans or Servings Per Day:  2       Frequency:  Daily  Entered: Sep 27 2020  7:51AM

## 2020-10-02 NOTE — PROGRESS NOTE ADULT - SUBJECTIVE AND OBJECTIVE BOX
Subjective: No current complaints. He feels well.     Medications:  acetaminophen   Tablet .. 650 milliGRAM(s) Oral every 6 hours PRN  aspirin enteric coated 81 milliGRAM(s) Oral daily  chlorhexidine 2% Cloths 1 Application(s) Topical <User Schedule>  dextrose 40% Gel 15 Gram(s) Oral once PRN  dextrose 5%. 1000 milliLiter(s) IV Continuous <Continuous>  dextrose 50% Injectable 25 Gram(s) IV Push once  dextrose 50% Injectable 12.5 Gram(s) IV Push once  dextrose 50% Injectable 25 Gram(s) IV Push once  dronabinol 5 milliGRAM(s) Oral <User Schedule>  glucagon  Injectable 1 milliGRAM(s) IntraMuscular once PRN  heparin   Injectable 5000 Unit(s) SubCutaneous every 8 hours  insulin lispro (HumaLOG) corrective regimen sliding scale   SubCutaneous three times a day before meals  insulin lispro (HumaLOG) corrective regimen sliding scale   SubCutaneous at bedtime  insulin lispro Injectable (HumaLOG) 3 Unit(s) SubCutaneous three times a day before meals  pantoprazole    Tablet 40 milliGRAM(s) Oral before breakfast  pravastatin 20 milliGRAM(s) Oral at bedtime  predniSONE   Tablet 10 milliGRAM(s) Oral <User Schedule>  sodium chloride 0.9% lock flush 10 milliLiter(s) IV Push every 1 hour PRN  sodium chloride 0.9% lock flush 3 milliLiter(s) IV Push every 8 hours  tacrolimus 5 milliGRAM(s) Oral <User Schedule>      Physical Exam:      Vital Signs Last 24 Hrs  T(C): 36.6 (02 Oct 2020 11:23), Max: 36.7 (01 Oct 2020 20:33)  T(F): 97.9 (02 Oct 2020 11:23), Max: 98.1 (01 Oct 2020 20:33)  HR: 104 (02 Oct 2020 11:23) (102 - 125)  BP: 103/70 (02 Oct 2020 11:23) (103/70 - 119/82)  BP(mean): 81 (02 Oct 2020 11:23) (81 - 103)  RR: 18 (02 Oct 2020 11:23) (18 - 18)  SpO2: 100% (02 Oct 2020 11:23) (96% - 100%)    Daily     Daily Weight in k.5 (02 Oct 2020 07:20)    I&O's Summary    01 Oct 2020 07:01  -  02 Oct 2020 07:00  --------------------------------------------------------  IN: 1140 mL / OUT: 1250 mL / NET: -110 mL    02 Oct 2020 07:01  -  02 Oct 2020 13:05  --------------------------------------------------------  IN: 420 mL / OUT: 300 mL / NET: 120 mL        General: No distress. Comfortable.  HEENT: EOM intact.  Neck: Neck supple. JVP not elevated. No masses  Chest: Clear to auscultation bilaterally  CV: Tachycardic. Normal S1 and S2. No rubs or gallops. Radial pulses normal. No edema.   Abdomen: Soft, non-distended, non-tender  Skin: No rashes or skin breakdown  Neurology: Alert and oriented times three. Sensation intact  Psych: Affect normal    Labs:                        12.2   4.78  )-----------( 324      ( 02 Oct 2020 10:00 )             40.3     10-02    140  |  103  |  25<H>  ----------------------------<  100<H>  4.9   |  23  |  1.24    Ca    9.5      02 Oct 2020 10:00  Phos  3.8     10-01    Tacrolimus (), Serum: 9.2: Tacrolimus testing is performed on the [employer]  by   chemiluminescent microparticle immunoassay. The therapeutic range of   tacrolimus is not clearly defined but target 12-hour trough whole blood   concentrations are 5.0 - 20.0 ng/mL early post transplant. Twenty-four   hour   trough concentrations are 33-50% less than the corresponding 12-hour   trough. ng/mL (10.02.20 @ 10:13)

## 2020-10-02 NOTE — PROGRESS NOTE ADULT - PROBLEM SELECTOR PROBLEM 7
Edema of right upper arm
Clostridioides difficile diarrhea
Edema of right upper arm
Edema of right upper arm
Clostridioides difficile diarrhea
Clostridioides difficile diarrhea
Edema of right upper arm
Essential hypertension
Clostridioides difficile diarrhea
Clostridioides difficile diarrhea
Edema of right upper arm
Edema of right upper arm

## 2020-10-02 NOTE — PROGRESS NOTE ADULT - PROBLEM SELECTOR PROBLEM 4
CKD (chronic kidney disease), stage III
Acute renal failure superimposed on chronic kidney disease, unspecified CKD stage, unspecified acute renal failure type
CKD (chronic kidney disease), stage III
Autoimmune hemolytic anemia
Acute renal failure superimposed on chronic kidney disease, unspecified CKD stage, unspecified acute renal failure type
Autoimmune hemolytic anemia
Bacteremia
CKD (chronic kidney disease), stage III
Clostridioides difficile diarrhea
Cytomegalovirus infection, unspecified cytomegaloviral infection type
Acute renal failure superimposed on chronic kidney disease, unspecified CKD stage, unspecified acute renal failure type
Autoimmune hemolytic anemia
Bacteremia
CKD (chronic kidney disease), stage III
Clostridioides difficile diarrhea
Cytomegalovirus infection, unspecified cytomegaloviral infection type
Elevated creatine kinase
Acute renal failure superimposed on chronic kidney disease, unspecified CKD stage, unspecified acute renal failure type
CKD (chronic kidney disease), stage III
Acute renal failure superimposed on chronic kidney disease, unspecified CKD stage, unspecified acute renal failure type
CKD (chronic kidney disease), stage III
Cytomegalovirus infection, unspecified cytomegaloviral infection type
CKD (chronic kidney disease), stage III
CKD (chronic kidney disease), stage III
Clostridioides difficile diarrhea
Elevated creatine kinase
Acute renal failure superimposed on chronic kidney disease, unspecified CKD stage, unspecified acute renal failure type

## 2020-10-02 NOTE — CHART NOTE - NSCHARTNOTESELECT_GEN_ALL_CORE
Malnutrition Notification
Event Note
Event Note/Gastroenterology
GI - Capsule Endoscopy/Event Note
Gastroenterology/Event Note
Hematology/Event Note
Interventional Radiology
Nutrition Services
Physical exam
Vascular Surgery
Vascular Surgery
Wound Care Consult Note:/Event Note

## 2020-10-02 NOTE — DISCHARGE NOTE PROVIDER - CARE PROVIDER_API CALL
Edil Stahl  [facility]  [address] [place] [address]  Phone: [phone]  Fax: [phone]  Follow Up Time: 2 weeks

## 2020-10-02 NOTE — PROGRESS NOTE ADULT - PROBLEM SELECTOR PROBLEM 1
Clostridium difficile colitis
Heart transplanted
Clostridium difficile colitis
Heart transplanted
NOMRAN (acute kidney injury)
NORMAN (acute kidney injury)
Transplant
Transplant
NORMAN (acute kidney injury)
Clostridium difficile colitis
Heart transplanted
Clostridium difficile colitis
Clostridium difficile colitis
Heart transplanted
Clostridium difficile colitis
Clostridium difficile colitis
Heart transplanted
Clostridium difficile colitis

## 2020-10-02 NOTE — DISCHARGE NOTE PROVIDER - NSDCMRMEDTOKEN_GEN_ALL_CORE_FT
acetaminophen 325 mg oral tablet: 2 tab(s) orally every 6 hours, As needed, Mild Pain (1 - 3)  aspirin 81 mg oral delayed release tablet: 1 tab(s) orally once a day  dronabinol 5 mg oral capsule: 1 cap(s) orally   furosemide 20 mg oral tablet: 1 tab(s) orally once a day  HumaLOG KwikPen 100 units/mL injectable solution: 5 unit(s) subcutaneous before breakfast  7 units subcutaneous before lunch  7 units subcutaneous before dinner  pantoprazole 40 mg oral delayed release tablet: 1 tab(s) orally once a day (before a meal)  pravastatin 20 mg oral tablet: 1 tab(s) orally once a day (at bedtime)  predniSONE 20 mg oral tablet: 3 tab(s) orally   tacrolimus 5 mg oral capsule: 1 cap(s) orally    acetaminophen 325 mg oral tablet: 2 tab(s) orally every 6 hours, As needed, Mild Pain (1 - 3)  aspirin 81 mg oral delayed release tablet: 1 tab(s) orally once a day  dronabinol 5 mg oral capsule: 1 cap(s) orally   furosemide 20 mg oral tablet: 1 tab(s) orally once a day  HumaLOG KwikPen 100 units/mL injectable solution: 5 unit(s) subcutaneous before breakfast  7 units subcutaneous before lunch  7 units subcutaneous before dinner  pantoprazole 40 mg oral delayed release tablet: 1 tab(s) orally once a day (before a meal)  pravastatin 20 mg oral tablet: 1 tab(s) orally once a day (at bedtime)  predniSONE 5 mg oral tablet: 1 tab(s) orally once a day   tacrolimus 5 mg oral capsule: 1 cap(s) orally

## 2020-10-02 NOTE — PROGRESS NOTE ADULT - PROBLEM SELECTOR PLAN 2
- Continue tacrolimus, goal will be ~10 as monotherapy (previously on everolimus); - We will decrease the prednisone to 5 mg daily  - Continue pravastatin 20 mg nightly and aspirin 81 mg daily.

## 2020-10-02 NOTE — PROGRESS NOTE ADULT - ASSESSMENT
Mr. 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 He was treated with Rituximab and high dose steroids. He is currently admitted with symptomatic anemia with Hgb initially of 6.6 requiring blood transfusions. Evaluation was not consistent with hemolysis. EGD/enteroscopy eventually revealed chronic gastritis and small bowel ectasias.  His course was complicated by development of severe leukopenia and acute respiratory distress and profound sinus tachycardia on 8/13 in setting of Klebsiella bacteremia of unclear origin (preceded EGD). A CT scan of the chest showed a possible infiltrate. While his bacteremia was treated, he subsequently developed C diff colitis with severe abdominal distention. He also developed worsening acute on chronic renal failure likely from volume overload which has resolved with diuretics. He has ongoing improvement in abdominal distention and leukocytosis. He was briefly on TPN but now tolerating enteral feeds. He also has RUE pain/swelling likely related to known R subclavian occlusion with TPN administered through R midline which has since been discontinued and is improving. He continues to progress well and feels much better. Plan for discharge today.       Plan discussed in multidisciplinary round with [facility] NP team and CT surgery.

## 2020-10-02 NOTE — CHART NOTE - NSCHARTNOTEFT_GEN_A_CORE
Tried to reach out transplant team but was unable to do so.    We recommended to taper prednisone to 5mg but patient is still on 10mg. As long as there is no objection from transplant team, (if prednisone is also taken into account along with tacrolimus for rejection prevention), would taper prednisone to 5mg.   Patient to be followed in hematology outpatient service once discharged.    Roel Zurita MD, PGY-4  Translational Medical Oncology Fellow  Pager: [phone]  Case d/w Dr. Goldberg

## 2020-10-02 NOTE — PROGRESS NOTE ADULT - PROBLEM SELECTOR PROBLEM 6
Clostridioides difficile diarrhea
Severe malnutrition
Clostridioides difficile diarrhea
Essential hypertension
Severe malnutrition
Essential hypertension
Severe malnutrition
Severe malnutrition
Bacteremia
Clostridioides difficile diarrhea
Essential hypertension
Severe malnutrition
Essential hypertension
Severe malnutrition
Severe malnutrition
Essential hypertension

## 2020-10-02 NOTE — DISCHARGE NOTE PROVIDER - NSDCPNSUBOBJ_GEN_ALL_CORE
VITAL SIGNS    Telemetry:    Vital Signs Last 24 Hrs  T(C): 36.5 (10-02-20 @ 06:21), Max: 36.7 (10-01-20 @ 20:33)  T(F): 97.7 (10-02-20 @ 06:21), Max: 98.1 (10-01-20 @ 20:33)  HR: 125 (10-02-20 @ 08:57) (102 - 125)  BP: 113/80 (10-02-20 @ 08:57) (102/77 - 119/82)  RR: 18 (10-02-20 @ 08:57) (18 - 18)  SpO2: 96% (10-02-20 @ 08:57) (96% - 99%)            10-01 @ 07:01  -  10-02 @ 07:00  --------------------------------------------------------  IN: 1140 mL / OUT: 1250 mL / NET: -110 mL    10-02 @ 07:01  -  10-02 @ 10:56  --------------------------------------------------------  IN: 420 mL / OUT: 300 mL / NET: 120 mL       Daily     Daily Weight in k.5 (02 Oct 2020 07:20)  Admit Wt: Drug Dosing Weight  Height (cm): 170.18 (17 Aug 2020 14:29)  Weight (kg): 75.2 (17 Aug 2020 14:29)  BMI (kg/m2): 26 (17 Aug 2020 14:29)  BSA (m2): 1.87 (17 Aug 2020 14:29)      CAPILLARY BLOOD GLUCOSE      POCT Blood Glucose.: 81 mg/dL (02 Oct 2020 07:44)  POCT Blood Glucose.: 105 mg/dL (01 Oct 2020 22:01)  POCT Blood Glucose.: 113 mg/dL (01 Oct 2020 16:44)  POCT Blood Glucose.: 163 mg/dL (01 Oct 2020 11:49)          MEDICATIONS  acetaminophen   Tablet .. 650 milliGRAM(s) Oral every 6 hours PRN  aspirin enteric coated 81 milliGRAM(s) Oral daily  chlorhexidine 2% Cloths 1 Application(s) Topical <User Schedule>  dextrose 40% Gel 15 Gram(s) Oral once PRN  dextrose 5%. 1000 milliLiter(s) IV Continuous <Continuous>  dextrose 50% Injectable 12.5 Gram(s) IV Push once  dextrose 50% Injectable 25 Gram(s) IV Push once  dextrose 50% Injectable 25 Gram(s) IV Push once  dronabinol 5 milliGRAM(s) Oral <User Schedule>  glucagon  Injectable 1 milliGRAM(s) IntraMuscular once PRN  heparin   Injectable 5000 Unit(s) SubCutaneous every 8 hours  insulin lispro (HumaLOG) corrective regimen sliding scale   SubCutaneous three times a day before meals  insulin lispro (HumaLOG) corrective regimen sliding scale   SubCutaneous at bedtime  insulin lispro Injectable (HumaLOG) 3 Unit(s) SubCutaneous three times a day before meals  pantoprazole    Tablet 40 milliGRAM(s) Oral before breakfast  pravastatin 20 milliGRAM(s) Oral at bedtime  predniSONE   Tablet 10 milliGRAM(s) Oral <User Schedule>  sodium chloride 0.9% lock flush 3 milliLiter(s) IV Push every 8 hours  sodium chloride 0.9% lock flush 10 milliLiter(s) IV Push every 1 hour PRN  tacrolimus 5 milliGRAM(s) Oral <User Schedule>      >>> <<<  PHYSICAL EXAM  Subjective: NAD  Neurology: alert and oriented x 3, nonfocal, no gross deficits  CV : s1s2  Sternal Wound :  CDI , Stable  Lungs: cta b/l  Abdomen: soft, NT,ND, ( +)BM  :  voiding  Extremities:  -c/c/e     LABS  10-02    140  |  103  |  25<H>  ----------------------------<  100<H>  4.9   |  23  |  1.24    Ca    9.5      02 Oct 2020 10:00  Phos  3.8     10                                   12.2   4.78  )-----------( 324      ( 02 Oct 2020 10:00 )             40.3                 PAST MEDICAL & SURGICAL HISTORY:  H/O hemolytic anemia    H/O autoimmune hemolytic anemia    Knee pain, right    HLD (hyperlipidemia)    Former smoker    DVT of upper extremity (deep vein thrombosis)    Hepatitis C virus    GIB (gastrointestinal bleeding)    Ventricular fibrillation  s/p AICD    PAF (paroxysmal atrial fibrillation)  on xarelto    Non-Ischemic Cardiomyopathy    SVT (Supraventricular Tachycardia)    HTN    CHF (Congestive Heart Failure)    S/P right heart catheterization  biopsy multiple    H/O heart transplant  2018    Status post left hip replacement    History of Prior Ablation Treatment  for afib    AICD (Automatic Cardioverter/Defibrillator) Present  [employer] with 1 [employer] lead09- explanted and replaced with [employer] 2 leads on 09

## 2020-10-02 NOTE — DISCHARGE NOTE NURSING/CASE MANAGEMENT/SOCIAL WORK - PATIENT PORTAL LINK FT
You can access the FollowMyHealth Patient Portal offered by [facility] by registering at the following website: [email or web address]. By joining [employer]’s FollowMyHealth portal, you will also be able to view your health information using other applications (apps) compatible with our system.

## 2020-10-02 NOTE — PROGRESS NOTE ADULT - PROBLEM SELECTOR PROBLEM 2
Heart transplanted
Heart transplanted
Metabolic acidosis
Anemia
Anemia
Clostridioides difficile diarrhea
H/O hemolytic anemia
Heart transplanted
Heart transplanted
Metabolic acidosis
Anemia
Anemia, unspecified type
Anemia, unspecified type
Diarrhea
H/O hemolytic anemia
Heart transplanted
Anemia
Heart transplanted
Heart transplanted
Anemia
Anemia
H/O hemolytic anemia
Anemia
Heart transplanted
Anemia
Heart transplanted

## 2020-10-02 NOTE — PROGRESS NOTE ADULT - ASSESSMENT
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 s/p EGD with esophagitis and gastritis. Developed Klebsiella oxytoca bacteremia requiring transfer to CTU. Clinically improving, transferred to [facility], finished 10-day of ceftriaxone, however, developed severe C.diff colitis on Vancomycin 500mg q6h PO and IV Flagyl. He had RHC on 8/25 and found to have high filling pressure so transferred to CTU again.    #C.diff colitis with NORMAN- C.diff PCR detected (8/23). Repeat CT on 8/30 without evidence of toxic megacolon.   - clinically  has hit a plateau and without significant improvement but also without deterioration  tolerating minimal amounts of oral food  continue po vanco but dose to 125mg QID  through 9/24 followed by Vanco 125mg tid for one week followed by Vanco 125mg bid x 1 week through 10/1  then Vancomycin 125mg QD for a few weeks- discharge home on this dose  continue Marinol to improve appetite  Decision made not to pursue Bezlotoxumab based upon limited data and possible cardiac side effects       #OI prophylaxis-  -Was previously receiving high dose steroids  -CMV viral load(8/17, 8/26): neg,   -Valcyte and Bactrim d/c'ed on 8/17 due to leukopenia  -Galactomann<0.5, Fungitell<31  -Steroids being tapered currently 10mg/day prednisone    # CMV viral load  9/17= 256 copies/ml  repeat CMV viral laod sent 9/28 and pending result  possibly just a blip reflective of his immunosuppressed state/steroids  hold off on treatment decision until recent results return but if results are not back at time of discharge will discharge home on oral Valganciclovir    # flu vaccine prior to discharge ordered        Gary Lujan MD  [phone]  After 5pm/weekends [phone]

## 2020-10-02 NOTE — PROGRESS NOTE ADULT - PROBLEM SELECTOR PROBLEM 5
Autoimmune hemolytic anemia
Bacteremia
Bacteremia
Edema
Autoimmune hemolytic anemia
Bacteremia
Severe malnutrition
Autoimmune hemolytic anemia
Essential hypertension
Severe malnutrition
Anemia
Anemia
Autoimmune hemolytic anemia
Bacteremia
Edema
Essential hypertension
Severe malnutrition
Urinary tract infection without hematuria, site unspecified
Autoimmune hemolytic anemia
Bacteremia
Diarrhea
Bacteremia
Urinary tract infection without hematuria, site unspecified
Autoimmune hemolytic anemia
Bacteremia
Essential hypertension
Bacteremia

## 2020-10-02 NOTE — DISCHARGE NOTE PROVIDER - HOSPITAL COURSE
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 Patient was treated w/ prednisone and Rituximab. Tacro was discontinued and patient was also transitioned to everolimus  Admitted  6/16-6/19  for hyperglycemia.  Reported to transplant team having one done black tarry stool-  instructed to  present to [facility] for hemolytic anemia. Patient has been following with Hematology outpatient.  Denies CP  N/V diarrhea SOB. (11 Aug 2020 20:08)  /11 Admitted to [facility] with symptomatic anemia  8/13 EGD for investigation of tarry stools  8/15 SB capsule: stomach & SB lesions, likely ulcers.  8/17 EGD/push enteroscopy w/ angioectasias/erosions in small bowel. Gastropathy and esophagitis. Ulcer seen on VCE most likely scope trauma.    8/18 VSS transferred back to floor.   8/20 VS 9.0/28.4 stable Gastric biopsy results LA Grade A esophagitis.  - Acute gastritis. Biopsies taken to r/o CMV, No ulceration seen despite finding on capsule endoscopy - likely 2/2 scope  trauma that has since resolved. Pathology pending.  8/21 VSS H/H  8.1/25.7  trending down will monitor prednisone taper 30 mg today. Procardia 120 initiated today RHC biopsy Monday.   8/22 VVS; Patient reports loose stools x 2-3 days with 1 episode today.  Stool sent for C-dif and GI PCR. Abdominal X-ray revealed: dilated loops of bowel. Abdomen distended.  Patient denies N/V. GI called to re-evaluate. Continue with current medication regimen.  Awaiting for upcoming cardiac biopsy on Monday 8/24.  8/23 VVS; creat up to 2.28, repeating CMP, TTE ordered Bladder scan   8/24 cardiac bx cancelled. elev creat  to 2.74   cardio renal cslt called, + CDIF  increase vanco to 500 q6 ID following  8/25 VSS: RHC today as per transplant team; transfuse 2 units PRBC today as per Dr. Viramontes; continue vanco for c-diff; transfer patient to CTU after cath today   8/26. Dieretic challenge with good response   8/27: Downgraded to the floor then upgraded to the CTU again for concerning of abd distention   8/28 CT ABD & Pelvis reveals pancolitis  8/30: repeat CT scan of abd, gen surgery called to re-evaluated pt.  8/31: RUE duplex negative for DVT  9/4 NGT clamped, minimal residual. NGT removed. started sips   9/6 tolerating clear liquid diet  9/8 Bronch  9/9 1 prbc   9/10 increased tube feeds to 20cc/hr and tpn decreased to 63cc/hr from 125cc/hr   9/12 TPN discontinued  9/14 Diet advanced, tube feeds at 75% goal, central line removed and midline inserted.  9/14 pt completed approp 75 % of meal.   pt ambulated to the door this afternoon  9/15 Pt tolerating PO diet with supplemental tube feeds. calorie count iniated  9/16 VSS OOB in chair no acute distress transferred to [address] [place] [address] nocturnal tube feeds 80 cc/hrx12 - RUE + edema Tacro level 7.2  PT disposition  to rehab.  9/177 VSS flat affect dronabinol added appetite stimulant at goal with Glucerna nocturnal feeds.. Rosas removed this am  9/18 VSS Remains on Glucerna TF, failed calorie count, Glucerna increased to 3 daily. Loose BM Improving--continue on Vanco 125mg QID taper as per ID.   9/19 VVS; RUE edema --> Vascular surgery consult called per CHF for prior occlusion of right subclavian.  Right Midline D/C'd . No evidence of DVT. CT Venogram of RUE to evaluate patency of subclavian  9/20 HD stable, cont PO vanco. Tacro level 9.4.   9/21 OOB to chait   VSS  Loose BM  this am   on vanco  9/22 VSS small oral intake with encouragement  this am. Calorie count in place  9/23 VSS appetite increasing oral intake improving per ID Vancomycin to be decreased to BID  in am.  9/24 HD stable.  Improving appetite.  Vanco decreased to bid x 1 week then 125mg QD x 1 week.  Calorie count in progress.  9/25 c/w present management  9/26 VSS Lab holiday labs- Sunday-  RUE improving . Oral dietary intake  and appetite improving  9/27 VSS Low potassium diet nocturnal feeds d/c  on calorie count- labs this am  9/28 dc keofeed calorie count , VVS  9/29 VSS; as per Dr. Stahl - hydradany d/c'd - rehab Wednesday.   9/30 VVS; PT to re-evaluation for disposition to home. Continue with current medication regimen     10/1 VSS - 10/2 Stable for d/c home  reinstated.

## 2020-10-03 LAB — CMV DNA CSF QL NAA+PROBE: SIGNIFICANT CHANGE UP

## 2020-10-05 ENCOUNTER — LABORATORY RESULT (OUTPATIENT)
Age: 70
End: 2020-10-05

## 2020-10-08 ENCOUNTER — NON-APPOINTMENT (OUTPATIENT)
Age: 70
End: 2020-10-08

## 2020-10-08 ENCOUNTER — APPOINTMENT (OUTPATIENT)
Dept: HEART FAILURE | Facility: CLINIC | Age: 70
End: 2020-10-08
Payer: MEDICARE

## 2020-10-08 ENCOUNTER — LABORATORY RESULT (OUTPATIENT)
Age: 70
End: 2020-10-08

## 2020-10-08 VITALS
OXYGEN SATURATION: 100 % | WEIGHT: 165 LBS | BODY MASS INDEX: 25.3 KG/M2 | SYSTOLIC BLOOD PRESSURE: 128 MMHG | DIASTOLIC BLOOD PRESSURE: 87 MMHG | TEMPERATURE: 97.7 F | RESPIRATION RATE: 12 BRPM | HEART RATE: 135 BPM | HEIGHT: 67.9 IN

## 2020-10-08 VITALS — HEART RATE: 105 BPM

## 2020-10-08 DIAGNOSIS — E78.5 HYPERLIPIDEMIA, UNSPECIFIED: ICD-10-CM

## 2020-10-08 DIAGNOSIS — Z91.89 OTHER SPECIFIED PERSONAL RISK FACTORS, NOT ELSEWHERE CLASSIFIED: ICD-10-CM

## 2020-10-08 LAB
ALBUMIN SERPL ELPH-MCNC: 4.1 G/DL
ALP BLD-CCNC: 76 U/L
ALT SERPL-CCNC: 24 U/L
ANION GAP SERPL CALC-SCNC: 10 MMOL/L
AST SERPL-CCNC: 31 U/L
BASOPHILS # BLD AUTO: 0 K/UL
BASOPHILS NFR BLD AUTO: 0 %
BILIRUB SERPL-MCNC: 0.5 MG/DL
BUN SERPL-MCNC: 28 MG/DL
CALCIUM SERPL-MCNC: 9.3 MG/DL
CHLORIDE SERPL-SCNC: 111 MMOL/L
CO2 SERPL-SCNC: 22 MMOL/L
CREAT SERPL-MCNC: 1.15 MG/DL
EOSINOPHIL # BLD AUTO: 0.05 K/UL
EOSINOPHIL NFR BLD AUTO: 0.9 %
GLUCOSE SERPL-MCNC: 76 MG/DL
HCT VFR BLD CALC: 39.2 %
HGB BLD-MCNC: 11.5 G/DL
LYMPHOCYTES # BLD AUTO: 1.44 K/UL
LYMPHOCYTES NFR BLD AUTO: 28.7 %
MAGNESIUM SERPL-MCNC: 1.7 MG/DL
MAN DIFF?: NORMAL
MCHC RBC-ENTMCNC: 29.3 GM/DL
MCHC RBC-ENTMCNC: 31 PG
MCV RBC AUTO: 105.7 FL
MONOCYTES # BLD AUTO: 0.61 K/UL
MONOCYTES NFR BLD AUTO: 12.2 %
NEUTROPHILS # BLD AUTO: 2.92 K/UL
NEUTROPHILS NFR BLD AUTO: 56.5 %
PLATELET # BLD AUTO: 247 K/UL
POTASSIUM SERPL-SCNC: 5.3 MMOL/L
PROT SERPL-MCNC: 6.9 G/DL
RBC # BLD: 3.71 M/UL
RBC # FLD: 19 %
SODIUM SERPL-SCNC: 143 MMOL/L
TACROLIMUS SERPL-MCNC: 7.7 NG/ML
WBC # FLD AUTO: 5.01 K/UL

## 2020-10-08 PROCEDURE — 99214 OFFICE O/P EST MOD 30 MIN: CPT

## 2020-10-08 RX ORDER — INSULIN ASPART 100 [IU]/ML
100 INJECTION, SOLUTION INTRAVENOUS; SUBCUTANEOUS
Qty: 5 | Refills: 5 | Status: DISCONTINUED | COMMUNITY
Start: 2020-08-21 | End: 2020-10-08

## 2020-10-08 NOTE — PROGRESS NOTE ADULT - SUBJECTIVE AND OBJECTIVE BOX
VITAL SIGNS    Telemetry:  nsr 80    Vital Signs Last 24 Hrs  T(C): 36.6 (18 @ 08:00), Max: 36.8 (18 @ 20:13)  T(F): 97.9 (18 @ 08:00), Max: 98.3 (18 @ 20:13)  HR: 102 (18 @ 08:00) (85 - 102)  BP: 107/73 (18 @ 08:00) (106/75 - 122/80)  RR: 18 (18 @ 08:00) (17 - 18)  SpO2: 99% (18 @ 08:00) (99% - 110%)                    @ 07:01  -   @ 07:00  --------------------------------------------------------  IN: 880 mL / OUT: 600 mL / NET: 280 mL     @ 07:01  -   @ 11:02  --------------------------------------------------------  IN: 360 mL / OUT: 0 mL / NET: 360 mL          Daily     Daily Weight in k.3 (29 May 2018 11:29)        CAPILLARY BLOOD GLUCOSE      POCT Blood Glucose.: 211 mg/dL (30 May 2018 09:17)  POCT Blood Glucose.: 114 mg/dL (30 May 2018 07:30)  POCT Blood Glucose.: 100 mg/dL (29 May 2018 22:17)  POCT Blood Glucose.: 107 mg/dL (29 May 2018 16:31)  POCT Blood Glucose.: 275 mg/dL (29 May 2018 11:54)                Coumadin    [ ] YES          [ x ]      NO                                   PHYSICAL EXAM        Neurology: alert and oriented x 3, nonfocal, no gross deficits  CV : .S1S2 RRR  Sternal Wound :  CDI , Stable  Lungs: cta   Abdomen: soft, nontender, nondistended, positive bowel sounds, last bowel movement   :         voiding    Extremities:     - edema - calve tenderness          aspirin enteric coated 81 milliGRAM(s) Oral daily  atorvastatin 10 milliGRAM(s) Oral at bedtime  atovaquone Suspension 1500 milliGRAM(s) Oral daily  calcium carbonate 1250 mG + Vitamin D (OsCal 500 + D) 2 Tablet(s) Oral two times a day  diltiazem    milliGRAM(s) Oral daily  enoxaparin Injectable 70 milliGRAM(s) SubCutaneous two times a day  famotidine    Tablet 20 milliGRAM(s) Oral two times a day  insulin glargine Injectable (LANTUS) 13 Unit(s) SubCutaneous at bedtime  insulin lispro (HumaLOG) corrective regimen sliding scale   SubCutaneous three times a day before meals  insulin lispro (HumaLOG) corrective regimen sliding scale   SubCutaneous at bedtime  insulin lispro Injectable (HumaLOG) 8 Unit(s) SubCutaneous three times a day before meals  magnesium oxide 400 milliGRAM(s) Oral every 12 hours  mavyret 100mg/40mg  tablet 3 Tablet(s) 3 Tablet(s) Oral daily  multivitamin 1 Tablet(s) Oral daily  mycophenolate mofetil 750 milliGRAM(s) Oral two times a day  nystatin    Suspension 325852 Unit(s) Oral every 6 hours  predniSONE   Tablet 7.5 milliGRAM(s) Oral two times a day  silver sulfADIAZINE 1% Cream 1 Application(s) Topical two times a day  sodium bicarbonate 1300 milliGRAM(s) Oral every 8 hours  sodium chloride 0.9% lock flush 3 milliLiter(s) IV Push every 8 hours  tacrolimus 6 milliGRAM(s) Oral two times a day  valGANciclovir 450 milliGRAM(s) Oral daily                    Physical Therapy Rec:   Home  [  ]   Home w/ PT  [  ]  Rehab  [  ]  Discussed with Cardiothoracic Team at AM rounds. mitral regurgitation, heart failure VITAL SIGNS    Telemetry:  nsr 80    Vital Signs Last 24 Hrs  T(C): 36.6 (18 @ 08:00), Max: 36.8 (18 @ 20:13)  T(F): 97.9 (18 @ 08:00), Max: 98.3 (18 @ 20:13)  HR: 102 (18 @ 08:00) (85 - 102)  BP: 107/73 (18 @ 08:00) (106/75 - 122/80)  RR: 18 (18 @ 08:00) (17 - 18)  SpO2: 99% (18 @ 08:00) (99% - 110%)                    @ 07:01  -   @ 07:00  --------------------------------------------------------  IN: 880 mL / OUT: 600 mL / NET: 280 mL     @ 07:01  -   @ 11:02  --------------------------------------------------------  IN: 360 mL / OUT: 0 mL / NET: 360 mL          Daily     Daily Weight in k.3 (29 May 2018 11:29)        CAPILLARY BLOOD GLUCOSE      POCT Blood Glucose.: 211 mg/dL (30 May 2018 09:17)  POCT Blood Glucose.: 114 mg/dL (30 May 2018 07:30)  POCT Blood Glucose.: 100 mg/dL (29 May 2018 22:17)  POCT Blood Glucose.: 107 mg/dL (29 May 2018 16:31)  POCT Blood Glucose.: 275 mg/dL (29 May 2018 11:54)                Coumadin    [ ] YES          [ x ]      NO                                   PHYSICAL EXAM        Neurology: alert and oriented x 3, nonfocal, no gross deficits  CV : .S1S2 RRR  Sternal Wound :  CDI , Stable  Lungs: cta   Abdomen: soft, nontender, nondistended, positive bowel sounds, small scab  on skin with surrounding blister, now flat  :         voiding    Extremities:     - edema - calve tenderness          aspirin enteric coated 81 milliGRAM(s) Oral daily  atorvastatin 10 milliGRAM(s) Oral at bedtime  atovaquone Suspension 1500 milliGRAM(s) Oral daily  calcium carbonate 1250 mG + Vitamin D (OsCal 500 + D) 2 Tablet(s) Oral two times a day  diltiazem    milliGRAM(s) Oral daily  enoxaparin Injectable 70 milliGRAM(s) SubCutaneous two times a day  famotidine    Tablet 20 milliGRAM(s) Oral two times a day  insulin glargine Injectable (LANTUS) 13 Unit(s) SubCutaneous at bedtime  insulin lispro (HumaLOG) corrective regimen sliding scale   SubCutaneous three times a day before meals  insulin lispro (HumaLOG) corrective regimen sliding scale   SubCutaneous at bedtime  insulin lispro Injectable (HumaLOG) 8 Unit(s) SubCutaneous three times a day before meals  magnesium oxide 400 milliGRAM(s) Oral every 12 hours  mavyret 100mg/40mg  tablet 3 Tablet(s) 3 Tablet(s) Oral daily  multivitamin 1 Tablet(s) Oral daily  mycophenolate mofetil 750 milliGRAM(s) Oral two times a day  nystatin    Suspension 735471 Unit(s) Oral every 6 hours  predniSONE   Tablet 7.5 milliGRAM(s) Oral two times a day  silver sulfADIAZINE 1% Cream 1 Application(s) Topical two times a day  sodium bicarbonate 1300 milliGRAM(s) Oral every 8 hours  sodium chloride 0.9% lock flush 3 milliLiter(s) IV Push every 8 hours  tacrolimus 6 milliGRAM(s) Oral two times a day  valGANciclovir 450 milliGRAM(s) Oral daily                    Physical Therapy Rec:   Home  [  ]   Home w/ PT  [  ]  Rehab  [  ]  Discussed with Cardiothoracic Team at AM rounds.

## 2020-10-08 NOTE — DISCUSSION/SUMMARY
[FreeTextEntry1] : - Immunosuppression/Autoimmune hemolytic anemia: We will continue tacrolimus for a goal level of 8-10 with close monitoring for evidence of recurrence of hemolytic anemia. He will continue on the prednisone of 5 mg for now, which will be weaned per hematology in the absence of recurrent hemolysis. \par - S/P Heart transplantation: He has no evidence of clinically significant graft dysfunction or congestive heart failure on exam. 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 He has no significant CAV. \par - History of stage III CKD with RTA: Stable. \par - Antibiotic prophylaxis:CMV (intermediate risk): Continue valganciclovir 450 mg BID. We will check a CMV viral load today.  \par - CAV prophylaxis/hyperlipidemia: Continue ASA 81mg daily and pravachol 20 mg daily.\par - Ulcer prophylaxis: Continue famotidine 20mg daily.\par - Osteoporosis prophylaxis: Continue calcium + vit D 2 tabs BID.\par - Hypomagnesemia: Continue magnesium oxide.\par - Elevated PSA: Stable.\par - Chronic right knee pain, osteoarthritis: Continue to monitor.

## 2020-10-08 NOTE — ASSESSMENT
[FreeTextEntry1] : Mr. Baez is a 70 year old man with history of NICM, s/p heart transplantation on 2/23/18. 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 Early post transplant he received therapy with IVIG, plasmapheresis, and rituximab with course complicated by development of Nocardia pulmonary infection, which is resolved. He more recently developed autoimmune hemolytic anemia, complicated by Enterobacter bacteremia and severe C. diff colitis as well as low level CMV viremia.

## 2020-10-13 LAB — CMV DNA SPEC QL NAA+PROBE: NOT DETECTED IU/ML

## 2020-10-14 ENCOUNTER — APPOINTMENT (OUTPATIENT)
Dept: ENDOCRINOLOGY | Facility: CLINIC | Age: 70
End: 2020-10-14

## 2020-10-14 NOTE — ED PROVIDER NOTE - PROGRESS NOTE DETAILS
Quality 226: Preventive Care And Screening: Tobacco Use: Screening And Cessation Intervention: Patient screened for tobacco use and is an ex/non-smoker Quality 431: Preventive Care And Screening: Unhealthy Alcohol Use - Screening: Patient screened for unhealthy alcohol use using a single question and scores less than 2 times per year Detail Level: Detailed Quality 130: Documentation Of Current Medications In The Medical Record: Current Medications Documented Srinivas GARCIA: Discussed patient's BP dipping to SBP 70-80's here in ED with Dr. Nunes and possibly upgrading patient's admission to CCU vs CTICU. Dr. Nunes to call me back. Srinivas GARCIA: Dr. Nunes saw patient in the ED and states patient can go to [address] but will be in Step Down, followed by CT ICU.

## 2020-10-20 LAB
ALBUMIN SERPL ELPH-MCNC: 4.2 G/DL
ALP BLD-CCNC: 84 U/L
ALT SERPL-CCNC: 16 U/L
ANION GAP SERPL CALC-SCNC: 13 MMOL/L
AST SERPL-CCNC: 24 U/L
BASOPHILS # BLD AUTO: 0.02 K/UL
BASOPHILS NFR BLD AUTO: 0.4 %
BILIRUB SERPL-MCNC: 0.8 MG/DL
BUN SERPL-MCNC: 24 MG/DL
CALCIUM SERPL-MCNC: 9.6 MG/DL
CHLORIDE SERPL-SCNC: 106 MMOL/L
CO2 SERPL-SCNC: 24 MMOL/L
CREAT SERPL-MCNC: 1.34 MG/DL
EOSINOPHIL # BLD AUTO: 0.02 K/UL
EOSINOPHIL NFR BLD AUTO: 0.4 %
GLUCOSE SERPL-MCNC: 74 MG/DL
HCT VFR BLD CALC: 40.3 %
HGB BLD-MCNC: 12.4 G/DL
IMM GRANULOCYTES NFR BLD AUTO: 1.7 %
LYMPHOCYTES # BLD AUTO: 1.37 K/UL
LYMPHOCYTES NFR BLD AUTO: 28.3 %
MAGNESIUM SERPL-MCNC: 1.6 MG/DL
MAN DIFF?: NORMAL
MCHC RBC-ENTMCNC: 30.8 GM/DL
MCHC RBC-ENTMCNC: 31.2 PG
MCV RBC AUTO: 101.5 FL
MONOCYTES # BLD AUTO: 0.17 K/UL
MONOCYTES NFR BLD AUTO: 3.5 %
NEUTROPHILS # BLD AUTO: 3.18 K/UL
NEUTROPHILS NFR BLD AUTO: 65.7 %
PLATELET # BLD AUTO: 224 K/UL
POTASSIUM SERPL-SCNC: 4.7 MMOL/L
PROT SERPL-MCNC: 6.9 G/DL
RBC # BLD: 3.97 M/UL
RBC # FLD: 17.5 %
SODIUM SERPL-SCNC: 143 MMOL/L
TACROLIMUS SERPL-MCNC: 10.1 NG/ML
WBC # FLD AUTO: 4.84 K/UL

## 2020-10-21 ENCOUNTER — NON-APPOINTMENT (OUTPATIENT)
Age: 70
End: 2020-10-21

## 2020-10-21 PROCEDURE — 74019 RADEX ABDOMEN 2 VIEWS: CPT

## 2020-10-21 PROCEDURE — 83550 IRON BINDING TEST: CPT

## 2020-10-21 PROCEDURE — 88307 TISSUE EXAM BY PATHOLOGIST: CPT

## 2020-10-21 PROCEDURE — 85014 HEMATOCRIT: CPT

## 2020-10-21 PROCEDURE — 84300 ASSAY OF URINE SODIUM: CPT

## 2020-10-21 PROCEDURE — 82565 ASSAY OF CREATININE: CPT

## 2020-10-21 PROCEDURE — 76882 US LMTD JT/FCL EVL NVASC XTR: CPT

## 2020-10-21 PROCEDURE — 86880 COOMBS TEST DIRECT: CPT

## 2020-10-21 PROCEDURE — 81001 URINALYSIS AUTO W/SCOPE: CPT

## 2020-10-21 PROCEDURE — 36430 TRANSFUSION BLD/BLD COMPNT: CPT

## 2020-10-21 PROCEDURE — 88312 SPECIAL STAINS GROUP 1: CPT

## 2020-10-21 PROCEDURE — 82955 ASSAY OF G6PD ENZYME: CPT

## 2020-10-21 PROCEDURE — 87324 CLOSTRIDIUM AG IA: CPT

## 2020-10-21 PROCEDURE — 93505 ENDOMYOCARDIAL BIOPSY: CPT

## 2020-10-21 PROCEDURE — 84443 ASSAY THYROID STIM HORMONE: CPT

## 2020-10-21 PROCEDURE — 87040 BLOOD CULTURE FOR BACTERIA: CPT

## 2020-10-21 PROCEDURE — 84478 ASSAY OF TRIGLYCERIDES: CPT

## 2020-10-21 PROCEDURE — 74018 RADEX ABDOMEN 1 VIEW: CPT

## 2020-10-21 PROCEDURE — 88346 IMFLUOR 1ST 1ANTB STAIN PX: CPT

## 2020-10-21 PROCEDURE — 93971 EXTREMITY STUDY: CPT

## 2020-10-21 PROCEDURE — 84132 ASSAY OF SERUM POTASSIUM: CPT

## 2020-10-21 PROCEDURE — 87493 C DIFF AMPLIFIED PROBE: CPT

## 2020-10-21 PROCEDURE — 82947 ASSAY GLUCOSE BLOOD QUANT: CPT

## 2020-10-21 PROCEDURE — 97110 THERAPEUTIC EXERCISES: CPT

## 2020-10-21 PROCEDURE — 71260 CT THORAX DX C+: CPT

## 2020-10-21 PROCEDURE — 83540 ASSAY OF IRON: CPT

## 2020-10-21 PROCEDURE — 87070 CULTURE OTHR SPECIMN AEROBIC: CPT

## 2020-10-21 PROCEDURE — 93005 ELECTROCARDIOGRAM TRACING: CPT

## 2020-10-21 PROCEDURE — 97116 GAIT TRAINING THERAPY: CPT

## 2020-10-21 PROCEDURE — 83036 HEMOGLOBIN GLYCOSYLATED A1C: CPT

## 2020-10-21 PROCEDURE — 84484 ASSAY OF TROPONIN QUANT: CPT

## 2020-10-21 PROCEDURE — 97162 PT EVAL MOD COMPLEX 30 MIN: CPT

## 2020-10-21 PROCEDURE — 87449 NOS EACH ORGANISM AG IA: CPT

## 2020-10-21 PROCEDURE — P9045: CPT

## 2020-10-21 PROCEDURE — 80169 DRUG ASSAY EVEROLIMUS: CPT

## 2020-10-21 PROCEDURE — 82436 ASSAY OF URINE CHLORIDE: CPT

## 2020-10-21 PROCEDURE — 83605 ASSAY OF LACTIC ACID: CPT

## 2020-10-21 PROCEDURE — 74176 CT ABD & PELVIS W/O CONTRAST: CPT

## 2020-10-21 PROCEDURE — C1751: CPT

## 2020-10-21 PROCEDURE — 84295 ASSAY OF SERUM SODIUM: CPT

## 2020-10-21 PROCEDURE — 82570 ASSAY OF URINE CREATININE: CPT

## 2020-10-21 PROCEDURE — 88305 TISSUE EXAM BY PATHOLOGIST: CPT

## 2020-10-21 PROCEDURE — 99285 EMERGENCY DEPT VISIT HI MDM: CPT | Mod: 25

## 2020-10-21 PROCEDURE — 86618 LYME DISEASE ANTIBODY: CPT

## 2020-10-21 PROCEDURE — 87507 IADNA-DNA/RNA PROBE TQ 12-25: CPT

## 2020-10-21 PROCEDURE — 80048 BASIC METABOLIC PNL TOTAL CA: CPT

## 2020-10-21 PROCEDURE — 90686 IIV4 VACC NO PRSV 0.5 ML IM: CPT

## 2020-10-21 PROCEDURE — 86901 BLOOD TYPING SEROLOGIC RH(D): CPT

## 2020-10-21 PROCEDURE — 85027 COMPLETE CBC AUTOMATED: CPT

## 2020-10-21 PROCEDURE — 80076 HEPATIC FUNCTION PANEL: CPT

## 2020-10-21 PROCEDURE — 87150 DNA/RNA AMPLIFIED PROBE: CPT

## 2020-10-21 PROCEDURE — 86900 BLOOD TYPING SEROLOGIC ABO: CPT

## 2020-10-21 PROCEDURE — 86860 RBC ANTIBODY ELUTION: CPT

## 2020-10-21 PROCEDURE — 82728 ASSAY OF FERRITIN: CPT

## 2020-10-21 PROCEDURE — 84134 ASSAY OF PREALBUMIN: CPT

## 2020-10-21 PROCEDURE — 86753 PROTOZOA ANTIBODY NOS: CPT

## 2020-10-21 PROCEDURE — 83010 ASSAY OF HAPTOGLOBIN QUANT: CPT

## 2020-10-21 PROCEDURE — 80053 COMPREHEN METABOLIC PANEL: CPT

## 2020-10-21 PROCEDURE — 86666 EHRLICHIA ANTIBODY: CPT

## 2020-10-21 PROCEDURE — P9040: CPT

## 2020-10-21 PROCEDURE — 82962 GLUCOSE BLOOD TEST: CPT

## 2020-10-21 PROCEDURE — 82553 CREATINE MB FRACTION: CPT

## 2020-10-21 PROCEDURE — 82803 BLOOD GASES ANY COMBINATION: CPT

## 2020-10-21 PROCEDURE — 84100 ASSAY OF PHOSPHORUS: CPT

## 2020-10-21 PROCEDURE — 82330 ASSAY OF CALCIUM: CPT

## 2020-10-21 PROCEDURE — 93931 UPPER EXTREMITY STUDY: CPT

## 2020-10-21 PROCEDURE — C1769: CPT

## 2020-10-21 PROCEDURE — 87641 MR-STAPH DNA AMP PROBE: CPT

## 2020-10-21 PROCEDURE — 93306 TTE W/DOPPLER COMPLETE: CPT

## 2020-10-21 PROCEDURE — 82247 BILIRUBIN TOTAL: CPT

## 2020-10-21 PROCEDURE — 85730 THROMBOPLASTIN TIME PARTIAL: CPT

## 2020-10-21 PROCEDURE — U0003: CPT

## 2020-10-21 PROCEDURE — 82435 ASSAY OF BLOOD CHLORIDE: CPT

## 2020-10-21 PROCEDURE — 85045 AUTOMATED RETICULOCYTE COUNT: CPT

## 2020-10-21 PROCEDURE — 83880 ASSAY OF NATRIURETIC PEPTIDE: CPT

## 2020-10-21 PROCEDURE — 36569 INSJ PICC 5 YR+ W/O IMAGING: CPT

## 2020-10-21 PROCEDURE — 82746 ASSAY OF FOLIC ACID SERUM: CPT

## 2020-10-21 PROCEDURE — 85018 HEMOGLOBIN: CPT

## 2020-10-21 PROCEDURE — 83615 LACTATE (LD) (LDH) ENZYME: CPT

## 2020-10-21 PROCEDURE — 83735 ASSAY OF MAGNESIUM: CPT

## 2020-10-21 PROCEDURE — 82550 ASSAY OF CK (CPK): CPT

## 2020-10-21 PROCEDURE — 87186 SC STD MICRODIL/AGAR DIL: CPT

## 2020-10-21 PROCEDURE — C1894: CPT

## 2020-10-21 PROCEDURE — 87640 STAPH A DNA AMP PROBE: CPT

## 2020-10-21 PROCEDURE — C8929: CPT

## 2020-10-21 PROCEDURE — 80197 ASSAY OF TACROLIMUS: CPT

## 2020-10-21 PROCEDURE — 76376 3D RENDER W/INTRP POSTPROCES: CPT

## 2020-10-21 PROCEDURE — 94660 CPAP INITIATION&MGMT: CPT

## 2020-10-21 PROCEDURE — 85379 FIBRIN DEGRADATION QUANT: CPT

## 2020-10-21 PROCEDURE — P9047: CPT

## 2020-10-21 PROCEDURE — 88341 IMHCHEM/IMCYTCHM EA ADD ANTB: CPT

## 2020-10-21 PROCEDURE — 74177 CT ABD & PELVIS W/CONTRAST: CPT

## 2020-10-21 PROCEDURE — 82248 BILIRUBIN DIRECT: CPT

## 2020-10-21 PROCEDURE — 85610 PROTHROMBIN TIME: CPT

## 2020-10-21 PROCEDURE — 97530 THERAPEUTIC ACTIVITIES: CPT

## 2020-10-21 PROCEDURE — 71250 CT THORAX DX C-: CPT

## 2020-10-21 PROCEDURE — 80158 DRUG ASSAY CYCLOSPORINE: CPT

## 2020-10-21 PROCEDURE — 71045 X-RAY EXAM CHEST 1 VIEW: CPT

## 2020-10-21 PROCEDURE — 87086 URINE CULTURE/COLONY COUNT: CPT

## 2020-10-21 PROCEDURE — 86922 COMPATIBILITY TEST ANTIGLOB: CPT

## 2020-10-21 PROCEDURE — 86850 RBC ANTIBODY SCREEN: CPT

## 2020-10-21 PROCEDURE — 82607 VITAMIN B-12: CPT

## 2020-10-21 PROCEDURE — C1884: CPT

## 2020-10-22 LAB — CMV DNA SPEC QL NAA+PROBE: NOT DETECTED IU/ML

## 2020-10-23 NOTE — ED PROVIDER NOTE - NS ED MD DISPO ADMIT [facility] UNIT
60yo obese M with PMHx of JOAQUINA, hx gerd and gastric ulcers, BPH, HLD    NKDA  NPO  Covid neg  No AC    Relevant Problems   ANESTHESIA   (+) Obstructive sleep apnea syndrome      NEURO   (+) History of gastric ulcer       Physical Exam    Airway   Mallampati: II       Cardiovascular - normal exam     Dental - normal exam           Pulmonary - normal exam     Abdominal   (+) obese     Neurological - normal exam                 Anesthesia Plan    ASA 3   ASA physical status 3 criteria: other (comment)    Plan - general       Airway plan will be ETT        Induction: intravenous and rapid sequence    Postoperative Plan: Postoperative administration of opioids is intended.    Pertinent diagnostic labs and testing reviewed    Informed Consent:    Anesthetic plan and risks discussed with patient.        
TELEMETRY

## 2020-10-26 NOTE — CONSULT NOTE ADULT - REASON FOR ADMISSION
----- Message from Ashlee Gómez MA sent at 10/23/2020  3:21 PM CDT -----    ----- Message -----  From: Mae Baker PA-C  Sent: 10/23/2020  12:38 PM CDT  To: [facility] Staff    Can you schedule this patient for follow-up in 2 weeks for a wound check and 6 weeks with an XR cervical spine (ordered)? He had an odontoid screw placement with Dr. Weaver on 10/22.    Thanks,  Mae       
APPOINTMENT MADE AND MAILED. T PATIENT.  
Abdominal Pain
abdominal pain
chest/abdominal pain
pain

## 2020-10-27 ENCOUNTER — OUTPATIENT (OUTPATIENT)
Dept: OUTPATIENT SERVICES | Facility: HOSPITAL | Age: 70
LOS: 1 days | Discharge: HOME | End: 2020-10-27

## 2020-10-27 DIAGNOSIS — Z94.1 HEART TRANSPLANT STATUS: Chronic | ICD-10-CM

## 2020-10-27 DIAGNOSIS — Z98.890 OTHER SPECIFIED POSTPROCEDURAL STATES: Chronic | ICD-10-CM

## 2020-10-27 DIAGNOSIS — D64.9 ANEMIA, UNSPECIFIED: ICD-10-CM

## 2020-10-28 ENCOUNTER — RESULT REVIEW (OUTPATIENT)
Age: 70
End: 2020-10-28

## 2020-10-28 ENCOUNTER — APPOINTMENT (OUTPATIENT)
Dept: HEMATOLOGY ONCOLOGY | Facility: CLINIC | Age: 70
End: 2020-10-28
Payer: MEDICARE

## 2020-10-28 VITALS
BODY MASS INDEX: 25.57 KG/M2 | SYSTOLIC BLOOD PRESSURE: 133 MMHG | HEART RATE: 77 BPM | HEIGHT: 66.97 IN | OXYGEN SATURATION: 99 % | DIASTOLIC BLOOD PRESSURE: 99 MMHG | TEMPERATURE: 97.3 F | RESPIRATION RATE: 14 BRPM | WEIGHT: 162.92 LBS

## 2020-10-28 LAB
BASOPHILS # BLD AUTO: 0.03 K/UL — SIGNIFICANT CHANGE UP (ref 0–0.2)
BASOPHILS NFR BLD AUTO: 1 % — SIGNIFICANT CHANGE UP (ref 0–2)
EOSINOPHIL # BLD AUTO: 0 K/UL — SIGNIFICANT CHANGE UP (ref 0–0.5)
EOSINOPHIL NFR BLD AUTO: 0 % — SIGNIFICANT CHANGE UP (ref 0–6)
HCT VFR BLD CALC: 38.5 % — LOW (ref 39–50)
HGB BLD-MCNC: 12.1 G/DL — LOW (ref 13–17)
LYMPHOCYTES # BLD AUTO: 1.07 K/UL — SIGNIFICANT CHANGE UP (ref 1–3.3)
LYMPHOCYTES # BLD AUTO: 37 % — SIGNIFICANT CHANGE UP (ref 13–44)
MCHC RBC-ENTMCNC: 31.4 G/DL — LOW (ref 32–36)
MCHC RBC-ENTMCNC: 31.8 PG — SIGNIFICANT CHANGE UP (ref 27–34)
MCV RBC AUTO: 101 FL — HIGH (ref 80–100)
METAMYELOCYTES # FLD: 3 % — HIGH (ref 0–0)
MONOCYTES # BLD AUTO: 0.38 K/UL — SIGNIFICANT CHANGE UP (ref 0–0.9)
MONOCYTES NFR BLD AUTO: 13 % — SIGNIFICANT CHANGE UP (ref 2–14)
NEUTROPHILS # BLD AUTO: 1.33 K/UL — LOW (ref 1.8–7.4)
NEUTROPHILS NFR BLD AUTO: 46 % — SIGNIFICANT CHANGE UP (ref 43–77)
NRBC # BLD: 0 /100 — SIGNIFICANT CHANGE UP (ref 0–0)
NRBC # BLD: SIGNIFICANT CHANGE UP /100 WBCS (ref 0–0)
PLAT MORPH BLD: NORMAL — SIGNIFICANT CHANGE UP
PLATELET # BLD AUTO: 263 K/UL — SIGNIFICANT CHANGE UP (ref 150–400)
RBC # BLD: 3.81 M/UL — LOW (ref 4.2–5.8)
RBC # FLD: 16.8 % — HIGH (ref 10.3–14.5)
RBC BLD AUTO: SIGNIFICANT CHANGE UP
RETICS #: 82.6 K/UL — SIGNIFICANT CHANGE UP (ref 25–125)
RETICS/RBC NFR: 2.2 % — SIGNIFICANT CHANGE UP (ref 0.5–2.5)
WBC # BLD: 2.9 K/UL — LOW (ref 3.8–10.5)
WBC # FLD AUTO: 2.9 K/UL — LOW (ref 3.8–10.5)

## 2020-10-28 PROCEDURE — 99215 OFFICE O/P EST HI 40 MIN: CPT

## 2020-10-28 RX ORDER — VALGANCICLOVIR HYDROCHLORIDE 450 MG/1
450 TABLET ORAL
Qty: 30 | Refills: 5 | Status: DISCONTINUED | COMMUNITY
Start: 2020-10-01 | End: 2020-10-28

## 2020-10-28 NOTE — CONSULT LETTER
[Dear  ___] : Dear  [unfilled], [Courtesy Letter:] : I had the pleasure of seeing your patient, [unfilled], in my office today. [Please see my note below.] : Please see my note below. [Sincerely,] : Sincerely, [FreeTextEntry3] : Tao Rosario M.D., FACP\par Professor of Medicine\par [employer]\par Associate Chief, Division of Hematology\par [facility]\par [facility]\par [address] [place] [address]\[place] [address]\par [phone]\par \par \par \par

## 2020-10-28 NOTE — PHYSICAL EXAM
[Restricted in physically strenuous activity but ambulatory and able to carry out work of a light or sedentary nature] : Status 1- Restricted in physically strenuous activity but ambulatory and able to carry out work of a light or sedentary nature, e.g., light house work, office work [Normal] : affect appropriate [de-identified] : RRR. S1S2 [place]. Gr 2/6 holosystolic murmur LLSB to apex. No gallops.

## 2020-10-28 NOTE — HISTORY OF PRESENT ILLNESS
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 Treatment - prednisone/Rituxan x 1\par 2/2018 Cardiac transplant\par Bilateral subclavian thrombosis\par UGI bleed [de-identified] : Chart reviewed. Feels well. Recently discharged after prolonged admission for UGI bleed. No c/o. No chest pain, SOB, abdominal pain, jaundice, dark urine, melena, BRBPR, fever, night sweats, swollen glands, arthritis, rash. Had flu vaccine.

## 2020-10-28 NOTE — RESULTS/DATA
[FreeTextEntry1] : WBC 2900 Hgb 12.1 Hct 38.5  Platelets 263,000 Diff normal. ANC 1300 Retics 2.2% RPI 1.1%\par \par 10/19/20 CMP creatinine 1.34\par 10/5/20 Retic 4.4%, haptoglobin 62,

## 2020-11-02 ENCOUNTER — LABORATORY RESULT (OUTPATIENT)
Age: 70
End: 2020-11-02

## 2020-11-03 ENCOUNTER — APPOINTMENT (OUTPATIENT)
Dept: HEART FAILURE | Facility: CLINIC | Age: 70
End: 2020-11-03
Payer: MEDICARE

## 2020-11-03 ENCOUNTER — NON-APPOINTMENT (OUTPATIENT)
Age: 70
End: 2020-11-03

## 2020-11-03 VITALS
HEART RATE: 123 BPM | TEMPERATURE: 98.2 F | DIASTOLIC BLOOD PRESSURE: 91 MMHG | BODY MASS INDEX: 24.71 KG/M2 | SYSTOLIC BLOOD PRESSURE: 129 MMHG | RESPIRATION RATE: 18 BRPM | HEIGHT: 68 IN | OXYGEN SATURATION: 98 % | WEIGHT: 163 LBS

## 2020-11-03 PROCEDURE — 93000 ELECTROCARDIOGRAM COMPLETE: CPT

## 2020-11-03 PROCEDURE — 99215 OFFICE O/P EST HI 40 MIN: CPT

## 2020-11-03 NOTE — HISTORY OF PRESENT ILLNESS
[FreeTextEntry1] : Mr. Baez is a 70 year old man with past medical history of a nonischemic cardiomyopathy and chronic systolic heart failure s/p HM2 LVAD in June 2017 complicated by recurrent GI hemorrhage and possible pump thrombosis who underwent heart transplant on 2/23/18 with a hepatitis C positive donor. His course was complicated by bilateral IJ/subclavian thrombi, a persistent small right sided pleural effusion, as well as acute on chronic renal failure of unclear etiology. In addition, his post-operative course was complicated by graft dysfunction of unclear etiology and persistent C4d positive staining on endomyocardial biopsy with negative DSAs. He developed joshua evidence of graft dysfunction leading to treatment with plasmapheresis, IVIG, and rituximab. Subsequently in June of 2018 he was found to have an pneumonia, that was ultimately diagnosed as Nocardia. This was successfully treated with therapy completed in August 2019.\par \par In the spring, he was admitted with hemolytic anemia of unclear etiology. There were no clear precipitating factors, such as infection. He was treated with prednisone and Rituximab. Given the potential for CNI inhibitors leading to hemolytic anemia, we transitioned him to everolimus and he remained on high dose prednisone. He was subsequently admitted with acute anemia (not hemolytic). He developed severe leukopenia and Klebsiella bacteremia. Following treatment of this, he developed a severe C. diff colitis infection leading to a prolonged recovery. He was weaned to lower dose prednisone and the everolimus was transitioned back to tacrolimus. At the time of discharge he was found to have low levels of CMV viremia and was started back on valganciclovir. \par \par Pt here today for follow up clinic visit. He was seen by Hematology last week. Pt reports he has been doing well. He has no abdominal complaints or diarrhea. He gets dyspneic with walking >1 block, but is largely limited by his chronic knee osteoarthritis.  He notes ongoing mild pressure in his b/l shoulders, which was worse when he was admitted initially with hemolytic anemia.  He has no PND, orthopnea, cough, or swelling in the legs.  He has no fevers, chills, sweats, dysuria, hematuria, or headaches.  Appetite good, and sleeping well.  Home -130's/80-90's.

## 2020-11-03 NOTE — DISCUSSION/SUMMARY
[FreeTextEntry1] : 70yrs, DCM. s/p LVAD June 2017. s/p Feb 2018. Hep C positive. \par Major postransplant issue:\par PGD C4d positive  managed with PF/IVIG/Ritoxan. 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 \par Developed hemolytic anemia April 2020.. Lowest Hb 6. Treated with Pred and ritoxan X1. \par Transtioned from prograf to evero/cellcept without prograf. \par Admission June Leukopenia, Klebs bacteremia. cellcept stopped. \par Intermitted transfusions. Evero. High doses of prednisone. Followed hematology. \par Admission August Cdiff collitis. evero stopped. transitioned back to prograf. pred weaned. \par Found to have low levels of CMV viremia at end of d/c. Restarted on PO  Valctye treatment dose for one week. CMV became undetectable. Remains on valcyte 450 QD. Discharged 10.2.20\par No further admissions. No recent rejection. No bx since back on prograf. Last measure of LV function was 9.4. \par CMV PCRs negative 10.20\par  No recent PRBCs. \par Seen by hematology last week Dr Rosario. "mixed type autoimmnue hemolytic anemia, appears to be in reminssion on low dose pred"\par feels fine. no diahrea or fevers. \par meds: prograf 5/5 (6.1, 10.1), pred 5, asa, valcyte 450, lasix 20, meronol, PPI, Statin, \par 123SR, 129/91, 98, \par recent labs 11.2. \par WBC 3.4 (2.9, 4.8) Hb 13.1, Plt 318. Na 140, K 4.9, BUN 25, Cr 1.3\par . Hapto normal \par CMV PCR pending\par Resting tachcaardia. Borderline low prograf. Off cellcept.\par Hemolytic anemia appears to be in recession with stable Hb.\par Plan:\par For repeat EKG with steady basline.\par TTE prior to going home. \par Check CMV PCR in view of low WBC\par Patient would not tolerate cellcept in view of leukopenia. Needs CMV prophylaxis (valcyte) which is also BM depressant. Would favor continued pred at this dose. \par Osteoperosis prophylaxsis. Anual dex scan. \par Marvin Campbell \par \par \par

## 2020-11-04 ENCOUNTER — NON-APPOINTMENT (OUTPATIENT)
Age: 70
End: 2020-11-04

## 2020-11-12 ENCOUNTER — APPOINTMENT (OUTPATIENT)
Dept: CV DIAGNOSITCS | Facility: HOSPITAL | Age: 70
End: 2020-11-12

## 2020-11-12 ENCOUNTER — LABORATORY RESULT (OUTPATIENT)
Age: 70
End: 2020-11-12

## 2020-11-12 ENCOUNTER — NON-APPOINTMENT (OUTPATIENT)
Age: 70
End: 2020-11-12

## 2020-11-12 ENCOUNTER — OUTPATIENT (OUTPATIENT)
Dept: OUTPATIENT SERVICES | Facility: HOSPITAL | Age: 70
LOS: 1 days | End: 2020-11-12

## 2020-11-12 DIAGNOSIS — Z94.1 HEART TRANSPLANT STATUS: ICD-10-CM

## 2020-11-12 DIAGNOSIS — Z94.1 HEART TRANSPLANT STATUS: Chronic | ICD-10-CM

## 2020-11-12 DIAGNOSIS — Z98.890 OTHER SPECIFIED POSTPROCEDURAL STATES: Chronic | ICD-10-CM

## 2020-11-12 LAB
ALBUMIN SERPL ELPH-MCNC: 4.3 G/DL
ALP BLD-CCNC: 73 U/L
ALT SERPL-CCNC: 17 U/L
ANION GAP SERPL CALC-SCNC: 10 MMOL/L
AST SERPL-CCNC: 25 U/L
BASOPHILS # BLD AUTO: 0.03 K/UL
BASOPHILS NFR BLD AUTO: 0.9 %
BILIRUB SERPL-MCNC: 0.8 MG/DL
BUN SERPL-MCNC: 29 MG/DL
CALCIUM SERPL-MCNC: 9.6 MG/DL
CHLORIDE SERPL-SCNC: 110 MMOL/L
CO2 SERPL-SCNC: 24 MMOL/L
CREAT SERPL-MCNC: 1.39 MG/DL
EOSINOPHIL # BLD AUTO: 0 K/UL
EOSINOPHIL NFR BLD AUTO: 0 %
GLUCOSE SERPL-MCNC: 78 MG/DL
HAPTOGLOB SERPL-MCNC: 108 MG/DL
HCT VFR BLD CALC: 40.8 %
HGB BLD-MCNC: 12.9 G/DL
LYMPHOCYTES # BLD AUTO: 0.95 K/UL
LYMPHOCYTES NFR BLD AUTO: 28.3 %
MAGNESIUM SERPL-MCNC: 1.8 MG/DL
MAN DIFF?: NORMAL
MCHC RBC-ENTMCNC: 31.4 PG
MCHC RBC-ENTMCNC: 31.6 GM/DL
MCV RBC AUTO: 99.3 FL
MONOCYTES # BLD AUTO: 0.41 K/UL
MONOCYTES NFR BLD AUTO: 12.4 %
NEUTROPHILS # BLD AUTO: 1.89 K/UL
NEUTROPHILS NFR BLD AUTO: 55.7 %
PLATELET # BLD AUTO: 210 K/UL
POTASSIUM SERPL-SCNC: 4.6 MMOL/L
PROT SERPL-MCNC: 7 G/DL
RBC # BLD: 4.11 M/UL
RBC # FLD: 15.7 %
SODIUM SERPL-SCNC: 144 MMOL/L
TACROLIMUS SERPL-MCNC: 9 NG/ML
WBC # FLD AUTO: 3.34 K/UL

## 2020-11-12 PROCEDURE — 93356 MYOCRD STRAIN IMG SPCKL TRCK: CPT

## 2020-11-12 PROCEDURE — 93306 TTE W/DOPPLER COMPLETE: CPT | Mod: 26

## 2020-11-13 ENCOUNTER — RESULT REVIEW (OUTPATIENT)
Age: 70
End: 2020-11-13

## 2020-11-13 ENCOUNTER — INPATIENT (INPATIENT)
Facility: HOSPITAL | Age: 70
LOS: 3 days | Discharge: HOME | DRG: 287 | End: 2020-11-17
Attending: THORACIC SURGERY (CARDIOTHORACIC VASCULAR SURGERY) | Admitting: INTERNAL MEDICINE
Payer: MEDICARE

## 2020-11-13 ENCOUNTER — APPOINTMENT (OUTPATIENT)
Dept: HEART FAILURE | Facility: CLINIC | Age: 70
End: 2020-11-13

## 2020-11-13 VITALS
HEART RATE: 108 BPM | WEIGHT: 160.06 LBS | RESPIRATION RATE: 16 BRPM | DIASTOLIC BLOOD PRESSURE: 87 MMHG | HEIGHT: 68 IN | SYSTOLIC BLOOD PRESSURE: 128 MMHG | TEMPERATURE: 98 F | OXYGEN SATURATION: 98 %

## 2020-11-13 DIAGNOSIS — I25.10 ATHEROSCLEROTIC HEART DISEASE OF NATIVE CORONARY ARTERY WITHOUT ANGINA PECTORIS: ICD-10-CM

## 2020-11-13 DIAGNOSIS — Z86.2 PERSONAL HISTORY OF DISEASES OF THE BLOOD AND BLOOD-FORMING ORGANS AND CERTAIN DISORDERS INVOLVING THE IMMUNE MECHANISM: ICD-10-CM

## 2020-11-13 DIAGNOSIS — Z94.1 HEART TRANSPLANT STATUS: ICD-10-CM

## 2020-11-13 DIAGNOSIS — Z98.890 OTHER SPECIFIED POSTPROCEDURAL STATES: Chronic | ICD-10-CM

## 2020-11-13 DIAGNOSIS — Z79.2 LONG TERM (CURRENT) USE OF ANTIBIOTICS: ICD-10-CM

## 2020-11-13 DIAGNOSIS — Z94.1 HEART TRANSPLANT STATUS: Chronic | ICD-10-CM

## 2020-11-13 DIAGNOSIS — D84.9 IMMUNODEFICIENCY, UNSPECIFIED: ICD-10-CM

## 2020-11-13 LAB
EVEROLIMUS BLD-MCNC: <1 NG/ML
GLUCOSE BLDC GLUCOMTR-MCNC: 76 MG/DL — SIGNIFICANT CHANGE UP (ref 70–99)
SARS-COV-2 RNA SPEC QL NAA+PROBE: SIGNIFICANT CHANGE UP

## 2020-11-13 PROCEDURE — 93010 ELECTROCARDIOGRAM REPORT: CPT

## 2020-11-13 PROCEDURE — 93505 ENDOMYOCARDIAL BIOPSY: CPT | Mod: 26

## 2020-11-13 PROCEDURE — 88307 TISSUE EXAM BY PATHOLOGIST: CPT | Mod: 26

## 2020-11-13 PROCEDURE — 99152 MOD SED SAME PHYS/QHP 5/>YRS: CPT

## 2020-11-13 PROCEDURE — 99223 1ST HOSP IP/OBS HIGH 75: CPT | Mod: 25

## 2020-11-13 PROCEDURE — 88346 IMFLUOR 1ST 1ANTB STAIN PX: CPT | Mod: 26

## 2020-11-13 PROCEDURE — 99223 1ST HOSP IP/OBS HIGH 75: CPT

## 2020-11-13 PROCEDURE — 88342 IMHCHEM/IMCYTCHM 1ST ANTB: CPT | Mod: 26

## 2020-11-13 RX ORDER — VALGANCICLOVIR 450 MG/1
450 TABLET, FILM COATED ORAL
Refills: 0 | Status: DISCONTINUED | OUTPATIENT
Start: 2020-11-14 | End: 2020-11-17

## 2020-11-13 RX ORDER — PANTOPRAZOLE SODIUM 20 MG/1
40 TABLET, DELAYED RELEASE ORAL
Refills: 0 | Status: DISCONTINUED | OUTPATIENT
Start: 2020-11-13 | End: 2020-11-17

## 2020-11-13 RX ORDER — ASPIRIN/CALCIUM CARB/MAGNESIUM 324 MG
81 TABLET ORAL DAILY
Refills: 0 | Status: DISCONTINUED | OUTPATIENT
Start: 2020-11-13 | End: 2020-11-17

## 2020-11-13 RX ORDER — FUROSEMIDE 40 MG
20 TABLET ORAL DAILY
Refills: 0 | Status: DISCONTINUED | OUTPATIENT
Start: 2020-11-13 | End: 2020-11-14

## 2020-11-13 RX ORDER — DRONABINOL 2.5 MG
5 CAPSULE ORAL DAILY
Refills: 0 | Status: DISCONTINUED | OUTPATIENT
Start: 2020-11-14 | End: 2020-11-16

## 2020-11-13 RX ORDER — VANCOMYCIN HCL 1 G
125 VIAL (EA) INTRAVENOUS DAILY
Refills: 0 | Status: DISCONTINUED | OUTPATIENT
Start: 2020-11-13 | End: 2020-11-16

## 2020-11-13 RX ORDER — TACROLIMUS 5 MG/1
6 CAPSULE ORAL
Refills: 0 | Status: DISCONTINUED | OUTPATIENT
Start: 2020-11-13 | End: 2020-11-14

## 2020-11-13 RX ORDER — TACROLIMUS 5 MG/1
1 CAPSULE ORAL EVERY 12 HOURS
Refills: 0 | Status: DISCONTINUED | OUTPATIENT
Start: 2020-11-13 | End: 2020-11-13

## 2020-11-13 RX ORDER — CHOLECALCIFEROL (VITAMIN D3) 125 MCG
1000 CAPSULE ORAL DAILY
Refills: 0 | Status: DISCONTINUED | OUTPATIENT
Start: 2020-11-13 | End: 2020-11-17

## 2020-11-13 RX ORDER — FOLIC ACID 0.8 MG
1 TABLET ORAL DAILY
Refills: 0 | Status: DISCONTINUED | OUTPATIENT
Start: 2020-11-13 | End: 2020-11-17

## 2020-11-13 RX ORDER — TACROLIMUS 5 MG/1
5 CAPSULE ORAL
Refills: 0 | Status: DISCONTINUED | OUTPATIENT
Start: 2020-11-13 | End: 2020-11-13

## 2020-11-13 RX ORDER — ATORVASTATIN CALCIUM 80 MG/1
10 TABLET, FILM COATED ORAL AT BEDTIME
Refills: 0 | Status: DISCONTINUED | OUTPATIENT
Start: 2020-11-13 | End: 2020-11-13

## 2020-11-13 RX ADMIN — TACROLIMUS 6 MILLIGRAM(S): 5 CAPSULE ORAL at 20:18

## 2020-11-13 RX ADMIN — Medication 20 MILLIGRAM(S): at 21:37

## 2020-11-13 NOTE — CONSULT NOTE ADULT - SUBJECTIVE AND OBJECTIVE BOX
Subjective:    Medications:      Physical Exam:    Vitals:  Vital Signs Last 24 Hours  T(C): 36.4 (20 @ 11:57), Max: 36.4 (20 @ 11:09)  HR: 112 (20 @ 11:57) (108 - 112)  BP: 128/87 (20 @ 11:57) (128/87 - 128/87)  RR: 16 (20 @ 11:57) (16 - 16)  SpO2: 96% (20 @ 11:57) (96% - 98%)    Weight in k.6 ( @ 11:57)    I&O's Summary      Tele:    General: No distress. Comfortable.  HEENT: EOM intact.  Neck: Neck supple. JVP not elevated. No masses  Chest: Clear to auscultation bilaterally  CV: Normal S1 and S2. No murmurs, rub, or gallops. Radial pulses normal.  Abdomen: Soft, non-distended, non-tender  Skin: No rashes or skin breakdown  Neurology: Alert and oriented times three. Sensation intact  Psych: Affect normal    Labs:                         Subjective:    Mr. Baez is a 70 year old man with past medical history of a nonischemic cardiomyopathy and chronic systolic heart failure s/p HM2 LVAD in 2017 complicated by recurrent GI hemorrhage and possible pump thrombosis who underwent heart transplant on 18 with a hepatitis C positive donor. His course was complicated by bilateral IJ/subclavian thrombi, a persistent small right sided pleural effusion, as well as acute on chronic renal failure of unclear etiology. In addition, his post-operative course was complicated by graft dysfunction of unclear etiology and persistent C4d positive staining on endomyocardial biopsy with negative DSAs. He developed joshua evidence of graft dysfunction leading to treatment with plasmapheresis, IVIG, and rituximab. Subsequently in 2018 he was found to have an pneumonia, that was ultimately diagnosed as Nocardia. This was successfully treated with therapy completed in 2019.    In the spring, he was admitted with hemolytic anemia of unclear etiology. There were no clear precipitating factors, such as infection. He was treated with prednisone and Rituximab. Given the potential for CNI inhibitors leading to hemolytic anemia, we transitioned him to everolimus and he remained on high dose prednisone. He was subsequently admitted with acute anemia (not hemolytic). He developed severe leukopenia and Klebsiella bacteremia. Following treatment of this, he developed a severe C. diff colitis infection leading to a prolonged recovery. He was weaned to lower dose prednisone and the everolimus was transitioned back to tacrolimus. At the time of discharge he was found to have low levels of CMV viremia and was started back on valganciclovir and repeat CMV was undetectable.     He presented today for scheduled EMB given new findings of graft dysfunction by TTE , at which time his Tacrolimus dosing was increased to 6 mg BID (from 5mg BID). Previous to this, had been seen in clinic for a routine follow-up visit where he was noted to feel well but with a resting tachycardia. Currently, he continues to feel well with unchanged exertional tolerance of ~ 1 block. He denies ABD discomfort, N/V/D, LH/dizziness, CP, palpitations, orthopnea, PND, cough and LE swelling. He has been taking his medications as directed.     Home Medications:  Tacrolimus 6 mg BID  Prednisone 5 mg QD  ASA 81 mg QD  Dronabinol 5 mg BID  Folic Acid 1 mg QD  Furosemide 20 mg QD  Vit D  Pantoprazole 40 mg QD  Pravastatin 20 mg qHS  Valganciclovir 450 mg QD    Physical Exam:    Vitals:  Vital Signs Last 24 Hours  T(C): 36.4 (20 @ 11:57), Max: 36.4 (20 @ 11:09)  HR: 112 (20 @ 11:57) (108 - 112)  BP: 128/87 (20 @ 11:57) (128/87 - 128/87)  RR: 16 (20 @ 11:57) (16 - 16)  SpO2: 96% (20 @ 11:57) (96% - 98%)    Weight in k.6 ( @ 11:57)    I&O's Summary    Tele: -120s    General: No distress. Comfortable.  HEENT: EOM intact.  Neck: Neck supple. JVP not elevated. No masses  Chest: Clear to auscultation bilaterally  CV: Tachycardic. Normal S1 and S2. No murmurs, rub, or gallops. Radial pulses normal.  Abdomen: Soft, non-distended, non-tender  Skin: No rashes or skin breakdown. R groin dressing C/D/I without hematoma  Neurology: Alert and oriented times three. Sensation intact  Psych: Affect normal    Labs:

## 2020-11-13 NOTE — H&P CARDIOLOGY - PSH
AICD (Automatic Cardioverter/Defibrillator) Present  [employer] with 1 [employer] lead4/1/09- explanted and replaced with [employer] 2 leads on 9/2/09  H/O heart transplant  2/2018  History of Prior Ablation Treatment  for afib  S/P right heart catheterization  biopsy multiple  Status post left hip replacement

## 2020-11-13 NOTE — CONSULT NOTE ADULT - ASSESSMENT
Mr. Baez is a 70 year old man with history of NICM, s/p heart transplantation on 2/23/18. 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 He more recently developed autoimmune hemolytic anemia, complicated by Enterobacter bacteremia and severe C. diff colitis as well as low level CMV viremia for which he was started Valcyte with most recent CMV PCR from 11/20 undetectable. He presents today for EMB given new findings of graft dysfunction but is overall feeling well. For now, we will continue his home medications without changes and admit for observation while we await official pathology report.

## 2020-11-13 NOTE — H&P CARDIOLOGY - HISTORY OF PRESENT ILLNESS
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 Patient was treated w/ prednisone and Rituximab. Tacro was discontinued and patient was also transitioned to everolimus  Admitted  6/16-6/19  for hyperglycemia.  Reported to transplant team having one done black tarry stool-  instructed to  present to [facility] for hemolytic anemia. Patient has been following with Hematology outpatient.  Denies CP  N/V diarrhea SOB.  Now presents for RHC today with Dr. Campbell.   < from: Transthoracic Echocardiogram (11.12.20 @ 07:52) >  Conclusions:  1. Mild aortic root dilatation  (Ao: 4.2 cm at the sinuses  of Valsalva).  2. Low normal left ventricular systolic function. Septal  motion consistent with cardiac surgery. The basal to mid  inferior wall appears hypokinetic.  3. The right ventricle is not well visualized; grossly  normal right ventricular systolic function.  4. Global Longitudinal Strain (GLS) is -14% (abnormal) GLS  was assessed on Ed (IE33 ) , /68. No  previous strain reported.  *** Compared with echocardiogram of 9/4/2020,   the basal  to mid  inferior wall appears hypokinetic. The EF is also a  bit lower now at50%  (previously 60%)  Discussed with Dr. 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 Patient was treated w/ prednisone and Rituximab. Admitted  6/16-6/19  for hyperglycemia. Reported to transplant team having one done black tarry stool-  instructed to  present to [facility] for hemolytic anemia unclear etiology. Treated with Rituximab and Prednisone. AS per Dr. Campbell given the potential for CNI inhibitors leading to hemolytic anemia, pt was transitioned to everolimus and stayed on high dose Prednisone. He was admitted for acute anemia ( not hemolytic) . He developed  severe Leukopenia and Klebsiella bacteremia. After tx he developed Severe C diff colitis. He was weaned to lower dose prednisone and the Everolimus was changed to Tacrolimus. At that time pt had lower levels of CMV viremia and was started on Valganciclovir . Pt gets dyspneic with walking >1block , but is largely limited by his chronic knee OA. He has no PND orthopnea, cough or swelling of his legs .  Patient has been following with Hematology outpatient.  Denies CP  N/V diarrhea SOB.    Now presents for RHC today with Dr. Campbell. Currently no acute distress noted.     < from: Transthoracic Echocardiogram (11.12.20 @ 07:52) >  Conclusions:  1. Mild aortic root dilatation  (Ao: 4.2 cm at the sinuses  of Valsalva).  2. Low normal left ventricular systolic function. Septal  motion consistent with cardiac surgery. The basal to mid  inferior wall appears hypokinetic.  3. The right ventricle is not well visualized; grossly  normal right ventricular systolic function.  4. Global Longitudinal Strain (GLS) is -14% (abnormal) GLS  was assessed on Ed (IE33 ) , /68. No  previous strain reported.  *** Compared with echocardiogram of 9/4/2020,   the basal  to mid  inferior wall appears hypokinetic. The EF is also a  bit lower now at50%  (previously 60%)  Discussed with Dr. Marvin Campbell  ------------------------------------------------------------------------    < end of copied text >

## 2020-11-13 NOTE — H&P ADULT - NSHPREVIEWOFSYSTEMS_GEN_ALL_CORE
Review of Systems  Constitutional: [ ] fever, [ ]weight loss,  [ ]fatigue  Eyes: [ ] visual changes  Respiratory: [ ]shortness of breath;  [ ] cough, [ ]wheezing, [ ]chills, [ ]hemoptysis  Cardiovascular: [x ] chest pain, [ ]palpitations, [ ]dizziness,  [ ]leg swelling[ ]orthopnea[ ]PND  Gastrointestinal: [ ] abdominal pain, [ ]nausea, [ ]vomiting,  [ ]diarrhea   Genitourinary: [ ] dysuria, [ ] hematuria  Neurologic: [ ] headaches [ ] tremors[ ]weakness  Skin: [ ] itching, [ ]burning, [ ] rashes  Endocrine: [ ] heat or cold intolerance  Musculoskeletal: [ ] joint pain or swelling; [ ] muscle, back, or extremity pain  Psychiatric: [ ] depression, [ ]anxiety, [ ]mood swings, or [ ]difficulty sleeping  Hematologic: [ ] easy bruising, [ ] bleeding gums  All other systems negative except as noted Review of Systems  Constitutional: [ ] fever, [ ]weight loss,  [ ]fatigue  Eyes: [ ] visual changes  Respiratory: [ ]shortness of breath;  [ ] cough, [ ]wheezing, [ ]chills, [ ]hemoptysis  Cardiovascular: [ ] chest pain, [ ]palpitations, [ ]dizziness,  [ ]leg swelling[ ]orthopnea[ ]PND  Gastrointestinal: [ ] abdominal pain, [ ]nausea, [ ]vomiting,  [ ]diarrhea   Genitourinary: [ ] dysuria, [ ] hematuria  Neurologic: [ ] headaches [ ] tremors[ ]weakness  Skin: [ ] itching, [ ]burning, [ ] rashes  Endocrine: [ ] heat or cold intolerance  Musculoskeletal: [ ] joint pain or swelling; [ ] muscle, back, or extremity pain  Psychiatric: [ ] depression, [ ]anxiety, [ ]mood swings, or [ ]difficulty sleeping  Hematologic: [ ] easy bruising, [ ] bleeding gums  All other systems negative except as noted

## 2020-11-13 NOTE — ASU PATIENT PROFILE, ADULT - PSH
AICD (Automatic Cardioverter/Defibrillator) Present  [employer] with 1 [employer] lead4/1/09- explanted and replaced with [employer] 2 leads on 9/2/09  H/O heart transplant  2/2018  History of Prior Ablation Treatment  for afib  LVAD (left ventricular assist device) present    Status post left hip replacement

## 2020-11-13 NOTE — CONSULT NOTE ADULT - PROBLEM SELECTOR RECOMMENDATION 9
- continue ASA 81 mg and Pravastatin 20 mg qHS for CAV prophylaxis  - continue lasix 20 mg QD  - s/p EMB 11/13 and DSA sent

## 2020-11-13 NOTE — H&P ADULT - HISTORY OF PRESENT ILLNESS
This is a 70y AA Male with no known implantable devices.  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 Patient was treated w/ prednisone and Rituximab. Admitted  6/16-6/19  for hyperglycemia. Reported to transplant team having one done black tarry stool- instructed to present to [facility] for hemolytic anemia unclear etiology. Treated with Rituximab and Prednisone. As per Dr. Campbell given the potential for CNI inhibitors leading to hemolytic anemia, pt was transitioned to everolimus and stayed on high dose Prednisone. He was admitted for acute anemia ( not hemolytic) . He developed  severe Leukopenia and Klebsiella bacteremia. After tx he developed Severe C diff colitis. He was weaned to lower dose prednisone and the Everolimus was changed to Tacrolimus. At that time pt had lower levels of CMV viremia and was started on Valganciclovir . Pt gets dyspneic with walking >1block , but is largely limited by his chronic knee OA. He has no PND orthopnea, cough or swelling of his legs .  Patient has been following with Hematology outpatient.  Denies CP  N/V diarrhea SOB. Yesterday patient had an echo which revealed a new LV dysfunction here today for biopsy.    Now presents for RHC today with Dr. Campbell. Currently no acute distress noted.

## 2020-11-13 NOTE — H&P ADULT - NSHPPHYSICALEXAM_GEN_ALL_CORE
Physical Exam: General: Well nourished, well developed, no acute distress.                                                         Neuro: Normal exam oriented to person/place & time with no focal motor or sensory  deficits.                    Eyes: Normal exam of conjunctiva & lids, pupils equally reactive.   ENT: Normal exam of nasal/oral mucosa with absence of cyanosis.   Neck: Normal exam of jugular veins, trachea & thyroid.   Chest: Normal lung exam with good air movement absence of wheezes, rales, or rhonchi:                                                                          CV:  Auscultation: normal [ ] S3[ ] S4[ ] Irregular [ ] Rub[ ] Clicks[ ]    Murmurs none:[]systolic [ ]  diastolic [ ] holosystolic [ ]  Carotids: No Bruits[ ] Other:                  Abdominal Aorta: normal [ ] nonpalpable[]                                                                         GI: Soft, non-tender, non-distended, liver & spleen with no noted masses or tenderness. Bowel sounds active in all 4 quadrants                                                                                             Extremities: Normal no evidence of cyanosis or deformity Edema: none[ ]trace[ ]1+[ ]2+[ ]3+[ ]4+[ ]  Lower Extremity Pulses: Right[   /4] Left[  /4 ] Varicosities[  ]  SKIN :  Normal exam to inspection & palpation.  No rashes, breakdown Physical Exam: General: Well nourished, well developed, no acute distress.                                                         Neuro: Normal exam oriented to person/place & time with no focal motor or sensory  deficits.                    Eyes: Normal exam of conjunctiva & lids, pupils equally reactive.   ENT: Normal exam of nasal/oral mucosa with absence of cyanosis.   Neck: Normal exam of jugular veins, trachea & thyroid.   Chest: Normal lung exam with good air movement absence of wheezes, rales, or rhonchi:                                                                          CV:  Auscultation: normal [X ] S3[ ] S4[ ] Irregular [ ] Rub[ ] Clicks[ ]    Murmurs none:[X]systolic [ ]  diastolic [ ] holosystolic [ ]  Carotids: No Bruits[ X] Other:                  Abdominal Aorta: normal [ ] nonpalpable[]                                                                         GI: Soft, non-tender, non-distended, liver & spleen with no noted masses or tenderness. Bowel sounds active in all 4 quadrants                                                                                             Extremities: Normal no evidence of cyanosis or deformity Edema: none[ X]trace[ ]1+[ ]2+[ ]3+[ ]4+[ ]  Lower Extremity Pulses: Right[   /4] Left[  /4 ] Varicosities[  ]  SKIN :  Normal exam to inspection & palpation.  No rashes, breakdown

## 2020-11-13 NOTE — H&P ADULT - NSHPSOCIALHISTORY_GEN_ALL_CORE
Marital Status:   Living Situation: lives alone with health aid who comes in to help  Occupation: disabled  Tobacco Use: none  Alcohol Use: none  Drug Use: none

## 2020-11-13 NOTE — CONSULT NOTE ADULT - ATTENDING COMMENTS
70yrs, s/p LVAD June 2017. s/p OHT feb 2018. Hep C positive.   Major posttransplant issues:  PGD with complement staining treated with PF/IVIG and ritoxan.   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   Developed hemolytic anemia April 2020. Lowest Hb 6. Treated with steriods and ritoxan X1.   Transitioned from prograf to evero/cellcept with no prograf due to the possibility of CNI induced hemolytic anemia.   Intermittent transfusions. High dose pred. Followed by hematology.   Admission June leukopenia, klebs bactermeia. cellcept stopped.   Admission August severe CDiff collitis. Evero stopped and transitioned back to prograf and pred weaned.   Found to have low levels of CMV viremia prior to d/c.     . 70yrs, s/p LVAD June 2017. s/p OHT feb 2018. Hep C positive.   Major posttransplant issues:  PGD with complement staining treated with PF/IVIG and ritoxan.   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   Developed hemolytic anemia April 2020. Lowest Hb 6. Treated with steriods and ritoxan X1.   Transitioned from prograf to evero/cellcept with no prograf due to the possibility of CNI induced hemolytic anemia.   Intermittent transfusions. High dose pred. Followed by hematology. 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 Recommended reinitiation of cellcept to spare steriods.  Admission June leukopenia, klebs bactermeia. cellcept stopped.   Admission August 11-Oct 2: severe CDiff collitis. Evero stopped and transitioned back to prograf and pred weaned.   Found to have low levels of CMV viremia prior to d/c. restarted on PO valcyte treatment dose for 1 week. CMV became undetectable. Remains on valcyte 450 daily. Last CMV PCR Nov2 undetectable.   No further admissions. No recent rejection. No biopsy since transitioned back to prograf in August.  TTE on Sept 4th normal.   Seen by myself 11.3 for routine visit, with no complaints. Found to have sinus tachycaardia to 120. TTE arranged as outpatient and performed yesterday which showed new regional LV dysfunction. Patient admitted for cardiac biopsy and observation.   meds: prograf 6/6 (level 9 on 11.12), pred 5, asa, valcyte 450 QD, lasix 20, merinol, PPI statin.   , /88. Ao sat 98  11.12: WBC 3.3, Hb 12.9, Plt 210. Na 144, K 4.6, Cl 110, Bi 24, BUN 29, Cr 1.4. LFTs normal.   TTE 11.12: LA 5.0, LVEDD 5.1, LVEF 50, inf wall hypo, RV grossly normal. mild MR. mild aortic root dilation.   RA 8, RV 30/8, PA 32/16 23, PCWP 8, Marino 6/2/3.3  70yrs with complicated post transplant history. managed off cellcept. prograf was low (6.1) earlier this month. Elevation of HR and new LV dysfunction. Normal hemodynamics on todays biopsy. Patient will remain here overnight pending read of both histopath and IF.   If there is no rejection, immunosupression can remain unchanged (prograf goal 8-10). Otherwise will need to consider higher prograf target and cautious reinitiation of cellcept.   Marvin Campbell     . 70yrs, s/p LVAD June 2017. s/p OHT feb 2018. Hep C positive.   Major posttransplant issues:  PGD with complement staining treated with PF/IVIG and ritoxan.   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   Developed hemolytic anemia April 2020. Lowest Hb 6. Treated with steriods and ritoxan X1.   Transitioned from prograf to evero/cellcept with no prograf due to the possibility of CNI induced hemolytic anemia.   Intermittent transfusions. High dose pred. Followed by hematology. 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 Recommended reinitiation of cellcept to spare steriods.  Admission June leukopenia, klebs bactermeia. cellcept stopped.   Admission August 11-Oct 2: severe CDiff collitis. Evero stopped and transitioned back to prograf and pred weaned.   Found to have low levels of CMV viremia prior to d/c. restarted on PO valcyte treatment dose for 1 week. CMV became undetectable. Remains on valcyte 450 daily. Last CMV PCR Nov2 undetectable.   No further admissions. No recent rejection. No biopsy since transitioned back to prograf in August.  TTE on Sept 4th normal.   Seen by myself 11.3 for routine visit, with no complaints. Found to have sinus tachycaardia to 120. TTE arranged as outpatient and performed yesterday which showed new regional LV dysfunction. Patient admitted for cardiac biopsy and observation.   meds: prograf 6/6 (level 9 on 11.12), pred 5, asa, valcyte 450 QD, lasix 20, merinol, PPI statin.   , /88. Ao sat 98  11.12: WBC 3.3, Hb 12.9, Plt 210. Na 144, K 4.6, Cl 110, Bi 24, BUN 29, Cr 1.4. LFTs normal.   TTE 11.12: LA 5.0, LVEDD 5.1, LVEF 50, inf wall hypo, RV grossly normal. mild MR. mild aortic root dilation.   RA 8, RV 30/8, PA 32/16 23, PCWP 8, Marino 6/2/3.3  70yrs with complicated post transplant history. managed off cellcept. prograf was low (6.1) earlier this month. Elevation of HR and new LV dysfunction. Normal hemodynamics on todays biopsy. Patient will remain here overnight pending read of both histopath and IF.   If there is no rejection, immunosupression can remain unchanged (prograf goal 8-10). Otherwise will need to consider higher prograf target and cautious reinitiation of cellcept.   Prograf level, Hep C viral load tomorrow.  Transplant team to check DSA  Marvin Campbell     .

## 2020-11-13 NOTE — CONSULT NOTE ADULT - PROBLEM SELECTOR RECOMMENDATION 2
- continue Tacrolimus 6 mg q12hrs (8am & 8pm) with dialy serum Tacrolimus level at 7am. Goal trough 8-10  - will remain off Cellcept  - continue Prednisone 5 mg PO QD. This dose appeared to keep him remission from mixed type autoimmune hemolytic anemia

## 2020-11-13 NOTE — CONSULT NOTE ADULT - PROBLEM SELECTOR RECOMMENDATION 3
- continue Valcyte 450 mg QD. Last CMV PCR from 11/2 was undetectable  - please send Hep C viral load serologies

## 2020-11-13 NOTE — H&P ADULT - NSICDXPASTSURGICALHX_GEN_ALL_CORE_FT
PAST SURGICAL HISTORY:  AICD (Automatic Cardioverter/Defibrillator) Present [employer] with 1 [employer] lead4/1/09- explanted and replaced with [employer] 2 leads on 9/2/09    H/O heart transplant 2/2018    History of Prior Ablation Treatment for afib    S/P right heart catheterization biopsy multiple    Status post left hip replacement

## 2020-11-14 DIAGNOSIS — Z86.19 PERSONAL HISTORY OF OTHER INFECTIOUS AND PARASITIC DISEASES: ICD-10-CM

## 2020-11-14 LAB
ALBUMIN SERPL ELPH-MCNC: 4.4 G/DL — SIGNIFICANT CHANGE UP (ref 3.3–5)
ALP SERPL-CCNC: 79 U/L — SIGNIFICANT CHANGE UP (ref 40–120)
ALT FLD-CCNC: 16 U/L — SIGNIFICANT CHANGE UP (ref 10–45)
ANION GAP SERPL CALC-SCNC: 12 MMOL/L — SIGNIFICANT CHANGE UP (ref 5–17)
AST SERPL-CCNC: 21 U/L — SIGNIFICANT CHANGE UP (ref 10–40)
BILIRUB SERPL-MCNC: 0.9 MG/DL — SIGNIFICANT CHANGE UP (ref 0.2–1.2)
BUN SERPL-MCNC: 25 MG/DL — HIGH (ref 7–23)
CALCIUM SERPL-MCNC: 9.5 MG/DL — SIGNIFICANT CHANGE UP (ref 8.4–10.5)
CHLORIDE SERPL-SCNC: 105 MMOL/L — SIGNIFICANT CHANGE UP (ref 96–108)
CO2 SERPL-SCNC: 22 MMOL/L — SIGNIFICANT CHANGE UP (ref 22–31)
CREAT SERPL-MCNC: 1.64 MG/DL — HIGH (ref 0.5–1.3)
GLUCOSE SERPL-MCNC: 102 MG/DL — HIGH (ref 70–99)
HCT VFR BLD CALC: 43.9 % — SIGNIFICANT CHANGE UP (ref 39–50)
HGB BLD-MCNC: 13.9 G/DL — SIGNIFICANT CHANGE UP (ref 13–17)
MCHC RBC-ENTMCNC: 31 PG — SIGNIFICANT CHANGE UP (ref 27–34)
MCHC RBC-ENTMCNC: 31.7 GM/DL — LOW (ref 32–36)
MCV RBC AUTO: 98 FL — SIGNIFICANT CHANGE UP (ref 80–100)
NRBC # BLD: 0 /100 WBCS — SIGNIFICANT CHANGE UP (ref 0–0)
PLATELET # BLD AUTO: 220 K/UL — SIGNIFICANT CHANGE UP (ref 150–400)
POTASSIUM SERPL-MCNC: 4.2 MMOL/L — SIGNIFICANT CHANGE UP (ref 3.5–5.3)
POTASSIUM SERPL-SCNC: 4.2 MMOL/L — SIGNIFICANT CHANGE UP (ref 3.5–5.3)
PROT SERPL-MCNC: 7.3 G/DL — SIGNIFICANT CHANGE UP (ref 6–8.3)
RBC # BLD: 4.48 M/UL — SIGNIFICANT CHANGE UP (ref 4.2–5.8)
RBC # FLD: 15.5 % — HIGH (ref 10.3–14.5)
SODIUM SERPL-SCNC: 139 MMOL/L — SIGNIFICANT CHANGE UP (ref 135–145)
SURGICAL PATHOLOGY STUDY: SIGNIFICANT CHANGE UP
TACROLIMUS SERPL-MCNC: 11.4 NG/ML — SIGNIFICANT CHANGE UP
WBC # BLD: 3.52 K/UL — LOW (ref 3.8–10.5)
WBC # FLD AUTO: 3.52 K/UL — LOW (ref 3.8–10.5)

## 2020-11-14 PROCEDURE — 93308 TTE F-UP OR LMTD: CPT | Mod: 26

## 2020-11-14 PROCEDURE — 99232 SBSQ HOSP IP/OBS MODERATE 35: CPT

## 2020-11-14 PROCEDURE — 93321 DOPPLER ECHO F-UP/LMTD STD: CPT | Mod: 26

## 2020-11-14 PROCEDURE — 99233 SBSQ HOSP IP/OBS HIGH 50: CPT

## 2020-11-14 RX ORDER — TACROLIMUS 5 MG/1
5.5 CAPSULE ORAL
Refills: 0 | Status: DISCONTINUED | OUTPATIENT
Start: 2020-11-14 | End: 2020-11-17

## 2020-11-14 RX ADMIN — Medication 1000 UNIT(S): at 12:18

## 2020-11-14 RX ADMIN — Medication 81 MILLIGRAM(S): at 12:18

## 2020-11-14 RX ADMIN — Medication 20 MILLIGRAM(S): at 05:48

## 2020-11-14 RX ADMIN — Medication 1 MILLIGRAM(S): at 12:18

## 2020-11-14 RX ADMIN — TACROLIMUS 5.5 MILLIGRAM(S): 5 CAPSULE ORAL at 20:09

## 2020-11-14 RX ADMIN — TACROLIMUS 6 MILLIGRAM(S): 5 CAPSULE ORAL at 08:14

## 2020-11-14 RX ADMIN — Medication 5 MILLIGRAM(S): at 05:49

## 2020-11-14 RX ADMIN — Medication 20 MILLIGRAM(S): at 21:27

## 2020-11-14 RX ADMIN — VALGANCICLOVIR 450 MILLIGRAM(S): 450 TABLET, FILM COATED ORAL at 08:14

## 2020-11-14 RX ADMIN — Medication 5 MILLIGRAM(S): at 12:18

## 2020-11-14 RX ADMIN — PANTOPRAZOLE SODIUM 40 MILLIGRAM(S): 20 TABLET, DELAYED RELEASE ORAL at 05:49

## 2020-11-14 NOTE — PROGRESS NOTE ADULT - SUBJECTIVE AND OBJECTIVE BOX
Subjective:  - Feeling well today  - Denies SOB, orthopnea, PND, CP, palpitations, lightheadedness, abdominal pain/distention, diarrhea, headache, fevers, chills    Medications:  aspirin enteric coated 81 milliGRAM(s) Oral daily  cholecalciferol 1000 Unit(s) Oral daily  dronabinol 5 milliGRAM(s) Oral daily  folic acid 1 milliGRAM(s) Oral daily  pantoprazole    Tablet 40 milliGRAM(s) Oral before breakfast  pravastatin 20 milliGRAM(s) Oral at bedtime  predniSONE   Tablet 5 milliGRAM(s) Oral daily  tacrolimus 6 milliGRAM(s) Oral <User Schedule>  valGANciclovir 450 milliGRAM(s) Oral <User Schedule>  vancomycin    Solution 125 milliGRAM(s) Oral daily      Physical Exam:    Vitals:  Vital Signs Last 24 Hours  T(C): 36.7 (20 @ 12:16), Max: 36.7 (20 @ 18:32)  HR: 115 (20 @ 12:16) (101 - 115)  BP: 119/87 (20 @ 12:16) (119/87 - 146/97)  RR: 18 (20 @ 12:16) (14 - 18)  SpO2: 96% (20 @ 12:16) (95% - 99%)    Weight in k.3 ( @ 09:57)    I&O's Summary      -  2020 07:00  --------------------------------------------------------  IN: 290 mL / OUT: 0 mL / NET: 290 mL      -  2020 13:10  --------------------------------------------------------  IN: 440 mL / OUT: 250 mL / NET: 190 mL    Tele: -120s    General: No distress. Comfortable.  HEENT: EOM intact.  Neck: Neck supple. JVP not elevated. No masses  Chest: Clear to auscultation bilaterally  CV: Tachycardic, regular. Normal S1 and S2. No murmurs, rub, or gallops. Radial pulses normal. No LE edema  Abdomen: Soft, non-distended, non-tender, normoactive BS  Skin: No rashes or skin breakdown. Warm peripherally  Neurology: Alert and oriented times three. Sensation intact  Psych: Affect normal    Labs:                        13.9   3.52  )-----------( 220      ( 2020 08:25 )             43.9     11-14    139  |  105  |  25<H>  ----------------------------<  102<H>  4.2   |  22  |  1.64<H>    Ca    9.5      2020 08:25    TPro  7.3  /  Alb  4.4  /  TBili  0.9  /  DBili  x   /  AST  21  /  ALT  16  /  AlkPhos  79  -14

## 2020-11-14 NOTE — PROGRESS NOTE ADULT - PROBLEM SELECTOR PLAN 1
- continue ASA 81 mg and Pravastatin 20 mg qHS for CAV prophylaxis  - Given regional WMA noted on echo will plan for LHC with IVUS to evaluate for transplant coronary vasculopathy  - Stop lasix given normal filling pressures and plan for LHC on Monday  - s/p EMB 11/13 and DSA sent.

## 2020-11-14 NOTE — PROGRESS NOTE ADULT - ASSESSMENT
Mr. Baez is a 70 year old man with history of NICM, s/p heart transplantation on 2/23/18. 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 He more recently developed autoimmune hemolytic anemia, complicated by Enterobacter bacteremia and severe C. diff colitis as well as low level CMV viremia for which he was started Valcyte with most recent CMV PCR from 11/20 undetectable. He presented 11/13 for EMB given new findings of graft dysfunction but is overall feeling well. Noted to have normal filling pressures on RHC. Final pathology pending on EMB. SCr uptrending today at 1.6. Tacro level pending.

## 2020-11-14 NOTE — PROGRESS NOTE ADULT - PROBLEM SELECTOR PLAN 2
Biopsy 11/13 11/14 - rounds made w/ hf team - pt to have ct cors monday 11/16  Cont outpatient meds  Will reassess meds after Biopsy results

## 2020-11-14 NOTE — PROGRESS NOTE ADULT - PROBLEM SELECTOR PLAN 3
continue Valcyte 450 mg QD. Last CMV PCR from 11/2 was undetectable  - please send Hep C viral load serologies.

## 2020-11-14 NOTE — PROGRESS NOTE ADULT - PROBLEM SELECTOR PLAN 2
- continue Tacrolimus 6 mg q12hrs (8am & 8pm) with dialy serum Tacrolimus level at 7am. Goal trough 8-10. Will follow up level drawn this morning.  - will remain off Cellcept  - continue Prednisone 5 mg PO QD. This dose appeared to keep him remission from mixed type autoimmune hemolytic anemia.

## 2020-11-14 NOTE — PROGRESS NOTE ADULT - ASSESSMENT
This is a 70y AA Male with no known implantable devices.  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 Patient was treated w/ prednisone and Rituximab. Admitted  6/16-6/19  for hyperglycemia. Reported to transplant team having one done black tarry stool- instructed to present to [facility] for hemolytic anemia unclear etiology. Treated with Rituximab and Prednisone. As per Dr. Campbell given the potential for CNI inhibitors leading to hemolytic anemia, pt was transitioned to everolimus and stayed on high dose Prednisone. He was admitted for acute anemia ( not hemolytic) . He developed  severe Leukopenia and Klebsiella bacteremia. After tx he developed Severe C diff colitis. He was weaned to lower dose prednisone and the Everolimus was changed to Tacrolimus. At that time pt had lower levels of CMV viremia and was started on Valganciclovir . Pt gets dyspneic with walking >1block , but is largely limited by his chronic knee OA. He has no PND orthopnea, cough or swelling of his legs .  Patient has been following with Hematology outpatient.  Denies CP  N/V diarrhea SOB. Yesterday patient had an echo which revealed a new LV dysfunction here today for biopsy.    11/14/20 - VSS - cardiac bx pending- AS per Dr. Campbell - heart failure MD - pt will need a CT coronaries on Monday - will be Npo after midnight on Sunday evening

## 2020-11-14 NOTE — PROGRESS NOTE ADULT - SUBJECTIVE AND OBJECTIVE BOX
VITAL SIGNS-Telemetry:  -109  Vital Signs Last 24 Hrs  T(C): 36.6 (20 @ 03:35), Max: 36.7 (20 @ 18:32)  T(F): 97.9 (20 @ 03:35), Max: 98.1 (20 @ 19:15)  HR: 109 (20 @ 03:35) (101 - 114)  BP: 125/89 (20 @ 03:35) (120/92 - 146/97)  RR: 18 (20 @ 03:35) (14 - 18)  SpO2: 95% (20 @ 03:35) (95% - 99%)          @ 07:01  -   @ 07:00  --------------------------------------------------------  IN: 290 mL / OUT: 0 mL / NET: 290 mL     @ 07:01  -   @ 11:20  --------------------------------------------------------  IN: 360 mL / OUT: 150 mL / NET: 210 mL    Daily Height in cm: 172.72 (2020 11:57)    Daily Weight in k.3 (2020 09:57)    CAPILLARY BLOOD GLUCOSE  POCT Blood Glucose.: 76 mg/dL (2020 19:46)          PHYSICAL EXAM:  Neurology: alert and oriented x 3, nonfocal, no gross deficits  CV : S1S2  Sternal Wound : healed  Lungs: CTA  Abdomen: soft, nontender, nondistended, positive bowel sounds, last bowel movement         Extremities:     no edema no calf tenderness    aspirin enteric coated 81 milliGRAM(s) Oral daily  cholecalciferol 1000 Unit(s) Oral daily  dronabinol 5 milliGRAM(s) Oral daily  folic acid 1 milliGRAM(s) Oral daily  furosemide    Tablet 20 milliGRAM(s) Oral daily  pantoprazole    Tablet 40 milliGRAM(s) Oral before breakfast  pravastatin 20 milliGRAM(s) Oral at bedtime  predniSONE   Tablet 5 milliGRAM(s) Oral daily  tacrolimus 6 milliGRAM(s) Oral <User Schedule>  valGANciclovir 450 milliGRAM(s) Oral <User Schedule>  vancomycin    Solution 125 milliGRAM(s) Oral daily    Physical Therapy Rec:   Home  [ x ]   Home w/ PT  [  ]  Rehab  [  ]  Discussed with Cardiothoracic Team at AM rounds.

## 2020-11-15 DIAGNOSIS — Z86.19 PERSONAL HISTORY OF OTHER INFECTIOUS AND PARASITIC DISEASES: ICD-10-CM

## 2020-11-15 LAB
ANION GAP SERPL CALC-SCNC: 13 MMOL/L — SIGNIFICANT CHANGE UP (ref 5–17)
BUN SERPL-MCNC: 25 MG/DL — HIGH (ref 7–23)
CALCIUM SERPL-MCNC: 9.5 MG/DL — SIGNIFICANT CHANGE UP (ref 8.4–10.5)
CHLORIDE SERPL-SCNC: 105 MMOL/L — SIGNIFICANT CHANGE UP (ref 96–108)
CO2 SERPL-SCNC: 21 MMOL/L — LOW (ref 22–31)
CREAT SERPL-MCNC: 1.51 MG/DL — HIGH (ref 0.5–1.3)
GLUCOSE SERPL-MCNC: 92 MG/DL — SIGNIFICANT CHANGE UP (ref 70–99)
HCT VFR BLD CALC: 43.6 % — SIGNIFICANT CHANGE UP (ref 39–50)
HGB BLD-MCNC: 13.7 G/DL — SIGNIFICANT CHANGE UP (ref 13–17)
MCHC RBC-ENTMCNC: 30.8 PG — SIGNIFICANT CHANGE UP (ref 27–34)
MCHC RBC-ENTMCNC: 31.4 GM/DL — LOW (ref 32–36)
MCV RBC AUTO: 98 FL — SIGNIFICANT CHANGE UP (ref 80–100)
NRBC # BLD: 0 /100 WBCS — SIGNIFICANT CHANGE UP (ref 0–0)
PLATELET # BLD AUTO: 208 K/UL — SIGNIFICANT CHANGE UP (ref 150–400)
POTASSIUM SERPL-MCNC: 4.5 MMOL/L — SIGNIFICANT CHANGE UP (ref 3.5–5.3)
POTASSIUM SERPL-SCNC: 4.5 MMOL/L — SIGNIFICANT CHANGE UP (ref 3.5–5.3)
RBC # BLD: 4.45 M/UL — SIGNIFICANT CHANGE UP (ref 4.2–5.8)
RBC # FLD: 15.4 % — HIGH (ref 10.3–14.5)
SODIUM SERPL-SCNC: 139 MMOL/L — SIGNIFICANT CHANGE UP (ref 135–145)
TACROLIMUS SERPL-MCNC: 11.2 NG/ML — SIGNIFICANT CHANGE UP
WBC # BLD: 3.48 K/UL — LOW (ref 3.8–10.5)
WBC # FLD AUTO: 3.48 K/UL — LOW (ref 3.8–10.5)

## 2020-11-15 PROCEDURE — 99233 SBSQ HOSP IP/OBS HIGH 50: CPT

## 2020-11-15 PROCEDURE — 99232 SBSQ HOSP IP/OBS MODERATE 35: CPT

## 2020-11-15 RX ADMIN — Medication 1 MILLIGRAM(S): at 11:48

## 2020-11-15 RX ADMIN — Medication 20 MILLIGRAM(S): at 20:14

## 2020-11-15 RX ADMIN — TACROLIMUS 5.5 MILLIGRAM(S): 5 CAPSULE ORAL at 19:56

## 2020-11-15 RX ADMIN — TACROLIMUS 5.5 MILLIGRAM(S): 5 CAPSULE ORAL at 07:56

## 2020-11-15 RX ADMIN — Medication 5 MILLIGRAM(S): at 06:03

## 2020-11-15 RX ADMIN — Medication 81 MILLIGRAM(S): at 11:48

## 2020-11-15 RX ADMIN — Medication 125 MILLIGRAM(S): at 11:48

## 2020-11-15 RX ADMIN — VALGANCICLOVIR 450 MILLIGRAM(S): 450 TABLET, FILM COATED ORAL at 07:56

## 2020-11-15 RX ADMIN — Medication 1000 UNIT(S): at 11:48

## 2020-11-15 RX ADMIN — PANTOPRAZOLE SODIUM 40 MILLIGRAM(S): 20 TABLET, DELAYED RELEASE ORAL at 06:03

## 2020-11-15 RX ADMIN — Medication 5 MILLIGRAM(S): at 11:48

## 2020-11-15 NOTE — PROGRESS NOTE ADULT - PROBLEM SELECTOR PLAN 2
- continue Tacrolimus 5.5 mg q12hrs (8am & 8pm), decreased 11/14 with daily serum Tacrolimus level at 7am. Goal trough 8-10.  - will remain off Cellcept  - continue Prednisone 5 mg PO QD. This dose appeared to keep him remission from mixed type autoimmune hemolytic anemia.

## 2020-11-15 NOTE — PROGRESS NOTE ADULT - ASSESSMENT
This is a 70y AA Male with no known implantable devices.  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 Patient was treated w/ prednisone and Rituximab. Admitted  6/16-6/19  for hyperglycemia. Reported to transplant team having one done black tarry stool- instructed to present to [facility] for hemolytic anemia unclear etiology. Treated with Rituximab and Prednisone. As per Dr. Campbell given the potential for CNI inhibitors leading to hemolytic anemia, pt was transitioned to everolimus and stayed on high dose Prednisone. He was admitted for acute anemia ( not hemolytic) . He developed  severe Leukopenia and Klebsiella bacteremia. After tx he developed Severe C diff colitis. He was weaned to lower dose prednisone and the Everolimus was changed to Tacrolimus. At that time pt had lower levels of CMV viremia and was started on Valganciclovir . Pt gets dyspneic with walking >1block , but is largely limited by his chronic knee OA. He has no PND orthopnea, cough or swelling of his legs .  Patient has been following with Hematology outpatient.  Denies CP  N/V diarrhea SOB. Yesterday patient had an echo which revealed a new LV dysfunction here today for biopsy.    11/14/20 - VSS - cardiac bx pending- AS per Dr. Campbell - heart failure MD - pt will need a CT coronaries on Monday - will be Npo after midnight on Sunday evening    11/15 VSS NPO for coronary CT in am as per Dr. Campbell. d/c plan - return home

## 2020-11-15 NOTE — PROGRESS NOTE ADULT - PROBLEM SELECTOR PLAN 1
- continue ASA 81 mg and Pravastatin 20 mg qHS for CAV prophylaxis  - Given regional WMA noted on echo will plan for LHC with IVUS to evaluate for transplant coronary vasculopathy, potentially Monday pending availability  - s/p EMB 11/13 and DSA sent.

## 2020-11-15 NOTE — PROGRESS NOTE ADULT - PROBLEM SELECTOR PLAN 2
coronary CT in am 11/15   Biopsy 11/13 11/14 - rounds made w/ hf team - pt to have ct cors monday 11/16  Cont outpatient meds  Will reassess meds after Biopsy results

## 2020-11-15 NOTE — PROGRESS NOTE ADULT - SUBJECTIVE AND OBJECTIVE BOX
Subjective:  - Feeling well without complaints  - Denies SOB, lightheadedness, headache, abdominal pain/distention, diarrhea, palpitations    Medications:  aspirin enteric coated 81 milliGRAM(s) Oral daily  cholecalciferol 1000 Unit(s) Oral daily  dronabinol 5 milliGRAM(s) Oral daily  folic acid 1 milliGRAM(s) Oral daily  pantoprazole    Tablet 40 milliGRAM(s) Oral before breakfast  pravastatin 20 milliGRAM(s) Oral at bedtime  predniSONE   Tablet 5 milliGRAM(s) Oral daily  tacrolimus 5.5 milliGRAM(s) Oral <User Schedule>  valGANciclovir 450 milliGRAM(s) Oral <User Schedule>  vancomycin    Solution 125 milliGRAM(s) Oral daily      Physical Exam:    Vitals:  Vital Signs Last 24 Hours  T(C): 36.5 (11-15-20 @ 06:47), Max: 36.7 (20 @ 19:57)  HR: 113 (11-15-20 @ 06:47) (109 - 113)  BP: 118/81 (11-15-20 @ 06:47) (118/81 - 134/93)  RR: 18 (11-15-20 @ 06:47) (18 - 18)  SpO2: 95% (11-15-20 @ 06:47) (95% - 99%)    Weight in k.8 (11-15 @ 11:18)    I&O's Summary    2020 07:  -  15 Nov 2020 07:00  --------------------------------------------------------  IN: 860 mL / OUT: 1350 mL / NET: -490 mL    15 Nov 2020 07:  -  15 2020 13:15  --------------------------------------------------------  IN: 420 mL / OUT: 200 mL / NET: 220 mL    Tele: -140s, generally 110-120s    General: No distress. Comfortable.  HEENT: EOM intact.  Neck: Neck supple. JVP not elevated. No masses  Chest: Clear to auscultation bilaterally  CV: Tachycardic, regular. Normal S1 and S2. III/VI SM LLSB. Radial pulses normal. No LE edema  Abdomen: Soft, non-distended, non-tender, normoactive BS  Skin: No rashes or skin breakdown. Warm peripherally  Neurology: Alert and oriented times three. Sensation intact  Psych: Affect normal    Labs:                        13.7   3.48  )-----------( 208      ( 15 Nov 2020 08:06 )             43.6     11-15    139  |  105  |  25<H>  ----------------------------<  92  4.5   |  21<L>  |  1.51<H>    Ca    9.5      15 Nov 2020 08:06    TPro  7.3  /  Alb  4.4  /  TBili  0.9  /  DBili  x   /  AST  21  /  ALT  16  /  AlkPhos  79  11-14

## 2020-11-15 NOTE — PROGRESS NOTE ADULT - SUBJECTIVE AND OBJECTIVE BOX
VITAL SIGNS-Telemetry:    Vital Signs Last 24 Hrs  T(C): 36.5 (11-15-20 @ 06:47), Max: 36.7 (20 @ 12:16)  T(F): 97.7 (11-15-20 @ 06:47), Max: 98.1 (20 @ 19:57)  HR: 113 (11-15-20 @ 06:47) (109 - 115)  BP: 118/81 (11-15-20 @ 06:47) (118/81 - 134/93)  RR: 18 (11-15-20 @ 06:47) (18 - 18)  SpO2: 95% (11-15-20 @ 06:47) (95% - 99%)          @ 07:01  -  11-15 @ 07:00  --------------------------------------------------------  IN: 860 mL / OUT: 1350 mL / NET: -490 mL    Daily     Daily Weight in k.3 (2020 09:57)    PHYSICAL EXAM:  Neurology: alert and oriented x 3, nonfocal, no gross deficits  CV : S1S2  Lungs: CTA  Abdomen: soft, nontender, nondistended, positive bowel sounds, last bowel movement         Extremities:     no edema, no calf tenderness    aspirin enteric coated 81 milliGRAM(s) Oral daily  cholecalciferol 1000 Unit(s) Oral daily  dronabinol 5 milliGRAM(s) Oral daily  folic acid 1 milliGRAM(s) Oral daily  pantoprazole    Tablet 40 milliGRAM(s) Oral before breakfast  pravastatin 20 milliGRAM(s) Oral at bedtime  predniSONE   Tablet 5 milliGRAM(s) Oral daily  tacrolimus 5.5 milliGRAM(s) Oral <User Schedule>  valGANciclovir 450 milliGRAM(s) Oral <User Schedule>  vancomycin    Solution 125 milliGRAM(s) Oral daily    Physical Therapy Rec:   Home  [ x ]   Home w/ PT  [  ]  Rehab  [  ]  Discussed with Cardiothoracic Team at AM rounds.

## 2020-11-15 NOTE — PROGRESS NOTE ADULT - ASSESSMENT
Mr. Baez is a 70 year old man with history of NICM, s/p heart transplantation on 2/23/18. 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 He more recently developed autoimmune hemolytic anemia, complicated by Enterobacter bacteremia and severe C. diff colitis as well as low level CMV viremia for which he was started Valcyte with most recent CMV PCR from 11/20 undetectable. He presented 11/13 for EMB given new findings of graft dysfunction but is overall feeling well. Noted to have normal filling pressures on RHC. Final pathology pending on EMB. SCr downtrending off diuretics, not yet back to baseline. Tacro level stable from yesterday, slightly supratherapeutic.

## 2020-11-16 LAB
ANION GAP SERPL CALC-SCNC: 10 MMOL/L — SIGNIFICANT CHANGE UP (ref 5–17)
BUN SERPL-MCNC: 34 MG/DL — HIGH (ref 7–23)
CALCIUM SERPL-MCNC: 9 MG/DL — SIGNIFICANT CHANGE UP (ref 8.4–10.5)
CHLORIDE SERPL-SCNC: 105 MMOL/L — SIGNIFICANT CHANGE UP (ref 96–108)
CMV DNA SPEC QL NAA+PROBE: NOT DETECTED IU/ML
CO2 SERPL-SCNC: 23 MMOL/L — SIGNIFICANT CHANGE UP (ref 22–31)
CREAT SERPL-MCNC: 1.55 MG/DL — HIGH (ref 0.5–1.3)
GLUCOSE SERPL-MCNC: 88 MG/DL — SIGNIFICANT CHANGE UP (ref 70–99)
HCT VFR BLD CALC: 39 % — SIGNIFICANT CHANGE UP (ref 39–50)
HGB BLD-MCNC: 12.5 G/DL — LOW (ref 13–17)
MCHC RBC-ENTMCNC: 31.2 PG — SIGNIFICANT CHANGE UP (ref 27–34)
MCHC RBC-ENTMCNC: 32.1 GM/DL — SIGNIFICANT CHANGE UP (ref 32–36)
MCV RBC AUTO: 97.3 FL — SIGNIFICANT CHANGE UP (ref 80–100)
NRBC # BLD: 0 /100 WBCS — SIGNIFICANT CHANGE UP (ref 0–0)
PLATELET # BLD AUTO: 189 K/UL — SIGNIFICANT CHANGE UP (ref 150–400)
POTASSIUM SERPL-MCNC: 4.5 MMOL/L — SIGNIFICANT CHANGE UP (ref 3.5–5.3)
POTASSIUM SERPL-SCNC: 4.5 MMOL/L — SIGNIFICANT CHANGE UP (ref 3.5–5.3)
RBC # BLD: 4.01 M/UL — LOW (ref 4.2–5.8)
RBC # FLD: 15.4 % — HIGH (ref 10.3–14.5)
SODIUM SERPL-SCNC: 138 MMOL/L — SIGNIFICANT CHANGE UP (ref 135–145)
TACROLIMUS SERPL-MCNC: 10 NG/ML — SIGNIFICANT CHANGE UP
WBC # BLD: 3.06 K/UL — LOW (ref 3.8–10.5)
WBC # FLD AUTO: 3.06 K/UL — LOW (ref 3.8–10.5)

## 2020-11-16 PROCEDURE — 99152 MOD SED SAME PHYS/QHP 5/>YRS: CPT

## 2020-11-16 PROCEDURE — 92978 ENDOLUMINL IVUS OCT C 1ST: CPT | Mod: 26,LM

## 2020-11-16 PROCEDURE — 99233 SBSQ HOSP IP/OBS HIGH 50: CPT

## 2020-11-16 PROCEDURE — 93458 L HRT ARTERY/VENTRICLE ANGIO: CPT | Mod: 26

## 2020-11-16 RX ORDER — SODIUM CHLORIDE 9 MG/ML
500 INJECTION INTRAMUSCULAR; INTRAVENOUS; SUBCUTANEOUS
Refills: 0 | Status: DISCONTINUED | OUTPATIENT
Start: 2020-11-16 | End: 2020-11-17

## 2020-11-16 RX ADMIN — Medication 125 MILLIGRAM(S): at 11:33

## 2020-11-16 RX ADMIN — VALGANCICLOVIR 450 MILLIGRAM(S): 450 TABLET, FILM COATED ORAL at 07:56

## 2020-11-16 RX ADMIN — TACROLIMUS 5.5 MILLIGRAM(S): 5 CAPSULE ORAL at 07:56

## 2020-11-16 RX ADMIN — Medication 5 MILLIGRAM(S): at 11:33

## 2020-11-16 RX ADMIN — PANTOPRAZOLE SODIUM 40 MILLIGRAM(S): 20 TABLET, DELAYED RELEASE ORAL at 05:29

## 2020-11-16 RX ADMIN — Medication 81 MILLIGRAM(S): at 11:33

## 2020-11-16 RX ADMIN — TACROLIMUS 5.5 MILLIGRAM(S): 5 CAPSULE ORAL at 20:21

## 2020-11-16 RX ADMIN — Medication 5 MILLIGRAM(S): at 05:29

## 2020-11-16 RX ADMIN — Medication 1000 UNIT(S): at 11:33

## 2020-11-16 RX ADMIN — Medication 20 MILLIGRAM(S): at 22:10

## 2020-11-16 RX ADMIN — SODIUM CHLORIDE 150 MILLILITER(S): 9 INJECTION INTRAMUSCULAR; INTRAVENOUS; SUBCUTANEOUS at 18:39

## 2020-11-16 RX ADMIN — Medication 1 MILLIGRAM(S): at 11:33

## 2020-11-16 NOTE — PROGRESS NOTE ADULT - ASSESSMENT
This is a 70y AA Male with no known implantable devices.  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 Patient was treated w/ prednisone and Rituximab. Admitted  6/16-6/19  for hyperglycemia. Reported to transplant team having one done black tarry stool- instructed to present to [facility] for hemolytic anemia unclear etiology. Treated with Rituximab and Prednisone. As per Dr. Campbell given the potential for CNI inhibitors leading to hemolytic anemia, pt was transitioned to everolimus and stayed on high dose Prednisone. He was admitted for acute anemia ( not hemolytic) . He developed  severe Leukopenia and Klebsiella bacteremia. After tx he developed Severe C diff colitis. He was weaned to lower dose prednisone and the Everolimus was changed to Tacrolimus. At that time pt had lower levels of CMV viremia and was started on Valganciclovir . Pt gets dyspneic with walking >1block , but is largely limited by his chronic knee OA. He has no PND orthopnea, cough or swelling of his legs .  Patient has been following with Hematology outpatient.  Denies CP  N/V diarrhea SOB. Yesterday patient had an echo which revealed a new LV dysfunction here today for biopsy.    11/14/20 - VSS - cardiac bx pending- AS per Dr. Campbell - heart failure MD - pt will need a CT coronaries on Monday - will be Npo after midnight on Sunday evening    11/15 VSS NPO for coronary CT in am as per Dr. Campbell. d/c plan - return home    11/16 HD stable, NPO for C with IVUS to evaluate for transplant coronary vasculopathy.      This is a 70y AA Male with no known implantable devices.  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 Patient was treated w/ prednisone and Rituximab. Admitted  6/16-6/19  for hyperglycemia. Reported to transplant team having one done black tarry stool- instructed to present to [facility] for hemolytic anemia unclear etiology. Treated with Rituximab and Prednisone. As per Dr. Campbell given the potential for CNI inhibitors leading to hemolytic anemia, pt was transitioned to everolimus and stayed on high dose Prednisone. He was admitted for acute anemia ( not hemolytic) . He developed  severe Leukopenia and Klebsiella bacteremia. After tx he developed Severe C diff colitis. He was weaned to lower dose prednisone and the Everolimus was changed to Tacrolimus. At that time pt had lower levels of CMV viremia and was started on Valganciclovir . Pt gets dyspneic with walking >1block , but is largely limited by his chronic knee OA. He has no PND orthopnea, cough or swelling of his legs .  Patient has been following with Hematology outpatient.  Denies CP  N/V diarrhea SOB. Yesterday patient had an echo which revealed a new LV dysfunction here today for biopsy.    11/14/20 - VSS - cardiac bx pending- AS per Dr. Campbell - heart failure MD - pt will need a CT coronaries on Monday - will be Npo after midnight on Sunday evening    11/15 VSS NPO for coronary CT in am as per Dr. Campbell. d/c plan - return home    11/16 HD stable, NPO for LHC with IVUS to evaluate for transplant coronary vasculopathy. PO vancomycin discontinued today.

## 2020-11-16 NOTE — PROGRESS NOTE ADULT - SUBJECTIVE AND OBJECTIVE BOX
Gena Hurtado MD  Cardiology Fellow   [phone]  All Cardiology service information can be found 24/7 on [email or web address], password: [email or web address]    No events overnight    ROS: denies dyspnea, chest pain      Meds:  aspirin enteric coated 81 milliGRAM(s) Oral daily  cholecalciferol 1000 Unit(s) Oral daily  dronabinol 5 milliGRAM(s) Oral daily  folic acid 1 milliGRAM(s) Oral daily  pantoprazole    Tablet 40 milliGRAM(s) Oral before breakfast  pravastatin 20 milliGRAM(s) Oral at bedtime  predniSONE   Tablet 5 milliGRAM(s) Oral daily  tacrolimus 5.5 milliGRAM(s) Oral <User Schedule>  valGANciclovir 450 milliGRAM(s) Oral <User Schedule>  vancomycin    Solution 125 milliGRAM(s) Oral daily      T(C): 36.7 (11-16-20 @ 13:08), Max: 36.8 (11-15-20 @ 19:26)  HR: 109 (11-16-20 @ 13:08) (109 - 113)  BP: 127/87 (11-16-20 @ 13:08) (116/83 - 128/91)  RR: 18 (11-16-20 @ 13:08) (18 - 18)  SpO2: 98% (11-16-20 @ 13:08) (96% - 98%)    11-15-20 @ 07:01  -  11-16-20 @ 07:00  --------------------------------------------------------  IN: 1140 mL / OUT: 710 mL / NET: 430 mL    11-16-20 @ 07:01  -  11-16-20 @ 13:25  --------------------------------------------------------  IN: 0 mL / OUT: 450 mL / NET: -450 mL        Exam:  ***    11-16    138  |  105  |  34<H>  ----------------------------<  88  4.5   |  23  |  1.55<H>    Ca    9.0      16 Nov 2020 07:47                            12.5   3.06  )-----------( 189      ( 16 Nov 2020 07:47 )             39.0        Gena Hurtado MD  Cardiology Fellow   [phone]  All Cardiology service information can be found 24/7 on [email or web address], password: [email or web address]    No events overnight    ROS: denies dyspnea, chest pain      Meds:  aspirin enteric coated 81 milliGRAM(s) Oral daily  cholecalciferol 1000 Unit(s) Oral daily  dronabinol 5 milliGRAM(s) Oral daily  folic acid 1 milliGRAM(s) Oral daily  pantoprazole    Tablet 40 milliGRAM(s) Oral before breakfast  pravastatin 20 milliGRAM(s) Oral at bedtime  predniSONE   Tablet 5 milliGRAM(s) Oral daily  tacrolimus 5.5 milliGRAM(s) Oral <User Schedule>  valGANciclovir 450 milliGRAM(s) Oral <User Schedule>  vancomycin    Solution 125 milliGRAM(s) Oral daily      T(C): 36.7 (11-16-20 @ 13:08), Max: 36.8 (11-15-20 @ 19:26)  HR: 109 (11-16-20 @ 13:08) (109 - 113)  BP: 127/87 (11-16-20 @ 13:08) (116/83 - 128/91)  RR: 18 (11-16-20 @ 13:08) (18 - 18)  SpO2: 98% (11-16-20 @ 13:08) (96% - 98%)    11-15-20 @ 07:01  -  11-16-20 @ 07:00  --------------------------------------------------------  IN: 1140 mL / OUT: 710 mL / NET: 430 mL    11-16-20 @ 07:01  -  11-16-20 @ 13:25  --------------------------------------------------------  IN: 0 mL / OUT: 450 mL / NET: -450 mL        Exam:  General: No distress. Comfortable.  HEENT: EOM intact.  Neck: Neck supple. JVP not elevated. No masses  Chest: Clear to auscultation bilaterally  CV: Tachycardic, regular. Normal S1 and S2. III/VI SM LLSB. Radial pulses normal. No LE edema  Abdomen: Soft, non-distended, non-tender, normoactive BS  Skin: No rashes or skin breakdown. Warm peripherally  Neurology: Alert and oriented times three. Sensation intact  Psych: Affect normal    11-16    138  |  105  |  34<H>  ----------------------------<  88  4.5   |  23  |  1.55<H>    Ca    9.0      16 Nov 2020 07:47                            12.5   3.06  )-----------( 189      ( 16 Nov 2020 07:47 )             39.0

## 2020-11-16 NOTE — PROGRESS NOTE ADULT - SUBJECTIVE AND OBJECTIVE BOX
Interval Hx; Events Overnight:  SUBJECTIVE: "I am hungry  "    LABS:                12.5                 138  | 23   | 34           3.06  >-----------< 189     ------------------------< 88                    39.0                 4.5  | 105  | 1.55                                         Ca 9.0   Mg x     Ph x              VITAL SIGNS    Telemetry: -120     Daily     Daily       Vital Signs Last 24 Hrs  T(C): 36.7 (11-16-20 @ 13:08), Max: 36.8 (11-15-20 @ 19:26)  T(F): 98 (11-16-20 @ 13:08), Max: 98.2 (11-15-20 @ 19:26)  HR: 109 (11-16-20 @ 13:08) (109 - 113)  BP: 127/87 (11-16-20 @ 13:08) (116/83 - 128/91)  RR: 18 (11-16-20 @ 13:08) (18 - 18)  SpO2: 98% (11-16-20 @ 13:08) (96% - 98%)             I&O's Detail    15 Nov 2020 07:01  -  16 Nov 2020 07:00  --------------------------------------------------------  IN:    Oral Fluid: 1140 mL  Total IN: 1140 mL    OUT:    Voided (mL): 710 mL  Total OUT: 710 mL    Total NET: 430 mL      16 Nov 2020 07:01  -  16 Nov 2020 14:29  --------------------------------------------------------  IN:  Total IN: 0 mL    OUT:    Oral Fluid: 0 mL    Voided (mL): 450 mL  Total OUT: 450 mL    Total NET: -450 mL                    GLUCOSE  CAPILLARY BLOOD GLUCOSE                              PHYSICAL EXAM      General: NAD, well appearing, in no distress  Neurology: A&O x3, non focal, no neuro deficits. Moves all extremities to command.  CV : s1 s2 RRR, no murmurs, gallops, clicks.   Sternal Wound :  Midsternal wound stable  Lungs: clear to auscultation  Abdomen: soft, nontender, nondistended, positive bowel sounds, +BM 11/13  :    voiding        Extremities:    no  edema. + pedal pulses      Skin: intact, no lesions        MEDICATIONS  aspirin enteric coated 81 milliGRAM(s) Oral daily  cholecalciferol 1000 Unit(s) Oral daily  dronabinol 5 milliGRAM(s) Oral daily  folic acid 1 milliGRAM(s) Oral daily  pantoprazole    Tablet 40 milliGRAM(s) Oral before breakfast  pravastatin 20 milliGRAM(s) Oral at bedtime  predniSONE   Tablet 5 milliGRAM(s) Oral daily  tacrolimus 5.5 milliGRAM(s) Oral <User Schedule>  valGANciclovir 450 milliGRAM(s) Oral <User Schedule>  vancomycin    Solution 125 milliGRAM(s) Oral daily

## 2020-11-17 ENCOUNTER — TRANSCRIPTION ENCOUNTER (OUTPATIENT)
Age: 70
End: 2020-11-17

## 2020-11-17 VITALS — WEIGHT: 158.07 LBS

## 2020-11-17 DIAGNOSIS — R00.0 TACHYCARDIA, UNSPECIFIED: ICD-10-CM

## 2020-11-17 LAB
ANION GAP SERPL CALC-SCNC: 11 MMOL/L — SIGNIFICANT CHANGE UP (ref 5–17)
BUN SERPL-MCNC: 25 MG/DL — HIGH (ref 7–23)
CALCIUM SERPL-MCNC: 9.5 MG/DL — SIGNIFICANT CHANGE UP (ref 8.4–10.5)
CHLORIDE SERPL-SCNC: 103 MMOL/L — SIGNIFICANT CHANGE UP (ref 96–108)
CO2 SERPL-SCNC: 23 MMOL/L — SIGNIFICANT CHANGE UP (ref 22–31)
CREAT SERPL-MCNC: 1.29 MG/DL — SIGNIFICANT CHANGE UP (ref 0.5–1.3)
GLUCOSE SERPL-MCNC: 79 MG/DL — SIGNIFICANT CHANGE UP (ref 70–99)
HCT VFR BLD CALC: 40.2 % — SIGNIFICANT CHANGE UP (ref 39–50)
HGB BLD-MCNC: 13.1 G/DL — SIGNIFICANT CHANGE UP (ref 13–17)
MAGNESIUM SERPL-MCNC: 1.8 MG/DL — SIGNIFICANT CHANGE UP (ref 1.6–2.6)
MCHC RBC-ENTMCNC: 31.3 PG — SIGNIFICANT CHANGE UP (ref 27–34)
MCHC RBC-ENTMCNC: 32.6 GM/DL — SIGNIFICANT CHANGE UP (ref 32–36)
MCV RBC AUTO: 95.9 FL — SIGNIFICANT CHANGE UP (ref 80–100)
NRBC # BLD: 0 /100 WBCS — SIGNIFICANT CHANGE UP (ref 0–0)
PLATELET # BLD AUTO: 185 K/UL — SIGNIFICANT CHANGE UP (ref 150–400)
POTASSIUM SERPL-MCNC: 4.7 MMOL/L — SIGNIFICANT CHANGE UP (ref 3.5–5.3)
POTASSIUM SERPL-SCNC: 4.7 MMOL/L — SIGNIFICANT CHANGE UP (ref 3.5–5.3)
RBC # BLD: 4.19 M/UL — LOW (ref 4.2–5.8)
RBC # FLD: 15.3 % — HIGH (ref 10.3–14.5)
SODIUM SERPL-SCNC: 137 MMOL/L — SIGNIFICANT CHANGE UP (ref 135–145)
TACROLIMUS SERPL-MCNC: 13.7 NG/ML — SIGNIFICANT CHANGE UP
WBC # BLD: 2.89 K/UL — LOW (ref 3.8–10.5)
WBC # FLD AUTO: 2.89 K/UL — LOW (ref 3.8–10.5)

## 2020-11-17 PROCEDURE — 88307 TISSUE EXAM BY PATHOLOGIST: CPT

## 2020-11-17 PROCEDURE — 93356 MYOCRD STRAIN IMG SPCKL TRCK: CPT

## 2020-11-17 PROCEDURE — 93321 DOPPLER ECHO F-UP/LMTD STD: CPT

## 2020-11-17 PROCEDURE — C1894: CPT

## 2020-11-17 PROCEDURE — 80048 BASIC METABOLIC PNL TOTAL CA: CPT

## 2020-11-17 PROCEDURE — 88346 IMFLUOR 1ST 1ANTB STAIN PX: CPT

## 2020-11-17 PROCEDURE — C1889: CPT

## 2020-11-17 PROCEDURE — 99153 MOD SED SAME PHYS/QHP EA: CPT

## 2020-11-17 PROCEDURE — 87635 SARS-COV-2 COVID-19 AMP PRB: CPT

## 2020-11-17 PROCEDURE — 93308 TTE F-UP OR LMTD: CPT

## 2020-11-17 PROCEDURE — 83735 ASSAY OF MAGNESIUM: CPT

## 2020-11-17 PROCEDURE — C1753: CPT

## 2020-11-17 PROCEDURE — 80053 COMPREHEN METABOLIC PANEL: CPT

## 2020-11-17 PROCEDURE — 93458 L HRT ARTERY/VENTRICLE ANGIO: CPT

## 2020-11-17 PROCEDURE — 85027 COMPLETE CBC AUTOMATED: CPT

## 2020-11-17 PROCEDURE — 93005 ELECTROCARDIOGRAM TRACING: CPT

## 2020-11-17 PROCEDURE — 88342 IMHCHEM/IMCYTCHM 1ST ANTB: CPT

## 2020-11-17 PROCEDURE — 93505 ENDOMYOCARDIAL BIOPSY: CPT

## 2020-11-17 PROCEDURE — 99152 MOD SED SAME PHYS/QHP 5/>YRS: CPT

## 2020-11-17 PROCEDURE — 93306 TTE W/DOPPLER COMPLETE: CPT

## 2020-11-17 PROCEDURE — C1887: CPT

## 2020-11-17 PROCEDURE — 99233 SBSQ HOSP IP/OBS HIGH 50: CPT

## 2020-11-17 PROCEDURE — 92978 ENDOLUMINL IVUS OCT C 1ST: CPT | Mod: LM

## 2020-11-17 PROCEDURE — 80197 ASSAY OF TACROLIMUS: CPT

## 2020-11-17 PROCEDURE — C1769: CPT

## 2020-11-17 PROCEDURE — 99238 HOSP IP/OBS DSCHRG MGMT 30/<: CPT

## 2020-11-17 PROCEDURE — 82962 GLUCOSE BLOOD TEST: CPT

## 2020-11-17 RX ORDER — VANCOMYCIN HCL 1 G
1 VIAL (EA) INTRAVENOUS
Qty: 0 | Refills: 0 | DISCHARGE

## 2020-11-17 RX ORDER — ASPIRIN/CALCIUM CARB/MAGNESIUM 324 MG
1 TABLET ORAL
Qty: 0 | Refills: 0 | DISCHARGE
Start: 2020-11-17

## 2020-11-17 RX ORDER — TACROLIMUS 1 MG/1
1 CAPSULE ORAL
Qty: 60 | Refills: 5 | Status: DISCONTINUED | COMMUNITY
Start: 2020-11-04 | End: 2020-11-17

## 2020-11-17 RX ORDER — TACROLIMUS 5 MG/1
1 CAPSULE ORAL
Qty: 0 | Refills: 0 | DISCHARGE

## 2020-11-17 RX ORDER — METOPROLOL TARTRATE 50 MG
50 TABLET ORAL DAILY
Refills: 0 | Status: DISCONTINUED | OUTPATIENT
Start: 2020-11-17 | End: 2020-11-17

## 2020-11-17 RX ORDER — FOLIC ACID 0.8 MG
1 TABLET ORAL
Qty: 0 | Refills: 0 | DISCHARGE

## 2020-11-17 RX ORDER — FOLIC ACID 0.8 MG
1 TABLET ORAL
Qty: 0 | Refills: 0 | DISCHARGE
Start: 2020-11-17

## 2020-11-17 RX ORDER — LANCETS 33 GAUGE
EACH MISCELLANEOUS
Qty: 1 | Refills: 5 | Status: DISCONTINUED | COMMUNITY
Start: 2020-08-21 | End: 2020-11-17

## 2020-11-17 RX ORDER — VANCOMYCIN HYDROCHLORIDE 125 MG/1
125 CAPSULE ORAL DAILY
Qty: 30 | Refills: 5 | Status: DISCONTINUED | COMMUNITY
Start: 2020-09-29 | End: 2020-11-17

## 2020-11-17 RX ORDER — METOPROLOL TARTRATE 50 MG
1 TABLET ORAL
Qty: 30 | Refills: 0
Start: 2020-11-17 | End: 2020-12-16

## 2020-11-17 RX ORDER — VALGANCICLOVIR 450 MG/1
1 TABLET, FILM COATED ORAL
Qty: 0 | Refills: 0 | DISCHARGE

## 2020-11-17 RX ORDER — PEN NEEDLE, DIABETIC 32 GX 1/4"
32G X 6 MM NEEDLE, DISPOSABLE MISCELLANEOUS
Qty: 1 | Refills: 5 | Status: DISCONTINUED | COMMUNITY
Start: 2020-06-18 | End: 2020-11-17

## 2020-11-17 RX ORDER — PANTOPRAZOLE SODIUM 20 MG/1
1 TABLET, DELAYED RELEASE ORAL
Qty: 0 | Refills: 0 | DISCHARGE
Start: 2020-11-17

## 2020-11-17 RX ORDER — ISOPROPYL ALCOHOL 0.7 ML/ML
SWAB TOPICAL
Qty: 1 | Refills: 5 | Status: DISCONTINUED | COMMUNITY
Start: 2020-08-03 | End: 2020-11-17

## 2020-11-17 RX ORDER — DRONABINOL 5 MG/1
5 CAPSULE ORAL TWICE DAILY
Qty: 60 | Refills: 5 | Status: DISCONTINUED | COMMUNITY
Start: 2020-09-30 | End: 2020-11-17

## 2020-11-17 RX ORDER — TACROLIMUS 5 MG/1
11 CAPSULE ORAL
Qty: 0 | Refills: 0 | DISCHARGE
Start: 2020-11-17

## 2020-11-17 RX ORDER — CHOLECALCIFEROL (VITAMIN D3) 125 MCG
1 CAPSULE ORAL
Qty: 0 | Refills: 0 | DISCHARGE

## 2020-11-17 RX ORDER — FUROSEMIDE 20 MG/1
20 TABLET ORAL
Qty: 30 | Refills: 5 | Status: DISCONTINUED | COMMUNITY
Start: 2020-09-30 | End: 2020-11-17

## 2020-11-17 RX ORDER — CHOLECALCIFEROL (VITAMIN D3) 125 MCG
1000 CAPSULE ORAL
Qty: 0 | Refills: 0 | DISCHARGE
Start: 2020-11-17

## 2020-11-17 RX ORDER — VALGANCICLOVIR 450 MG/1
1 TABLET, FILM COATED ORAL
Qty: 0 | Refills: 0 | DISCHARGE
Start: 2020-11-17

## 2020-11-17 RX ORDER — TACROLIMUS 5 MG/1
1 CAPSULE ORAL
Qty: 0 | Refills: 0 | DISCHARGE
Start: 2020-11-17

## 2020-11-17 RX ADMIN — PANTOPRAZOLE SODIUM 40 MILLIGRAM(S): 20 TABLET, DELAYED RELEASE ORAL at 05:56

## 2020-11-17 RX ADMIN — Medication 1000 UNIT(S): at 09:13

## 2020-11-17 RX ADMIN — Medication 1 MILLIGRAM(S): at 09:13

## 2020-11-17 RX ADMIN — Medication 5 MILLIGRAM(S): at 05:56

## 2020-11-17 RX ADMIN — Medication 50 MILLIGRAM(S): at 09:13

## 2020-11-17 RX ADMIN — Medication 81 MILLIGRAM(S): at 09:13

## 2020-11-17 RX ADMIN — VALGANCICLOVIR 450 MILLIGRAM(S): 450 TABLET, FILM COATED ORAL at 08:31

## 2020-11-17 RX ADMIN — TACROLIMUS 5.5 MILLIGRAM(S): 5 CAPSULE ORAL at 08:31

## 2020-11-17 NOTE — DISCHARGE NOTE NURSING/CASE MANAGEMENT/SOCIAL WORK - PATIENT PORTAL LINK FT
You can access the FollowMyHealth Patient Portal offered by [facility] by registering at the following website: [email or web address]. By joining [email or web address] FollowMyHealth portal, you will also be able to view your health information using other applications (apps) compatible with our system.

## 2020-11-17 NOTE — DISCHARGE NOTE NURSING/CASE MANAGEMENT/SOCIAL WORK - NSDCPNINST_GEN_ALL_CORE
PROPER INCISIONAL CARE TEACHING CONDUCTED WITH SUCCESSFUL RETURN DEMONSTRATION BY PATIENT. PATIENT INFORMED OF THE SIGNS TO REPORT TO MD. PATIENT INFORMED OF THE DANGERS INHERENT IN SKIPPING MEDICATION DOSES AND DOUBLE ON ANY MEDICATION.

## 2020-11-17 NOTE — DISCHARGE NOTE PROVIDER - CARE PROVIDER_API CALL
Marvin Campbell  CARDIOVASCULAR DISEASE  [address] [place] [id number]  Phone: [phone]  Fax: [phone]  Follow Up Time:

## 2020-11-17 NOTE — PROGRESS NOTE ADULT - REASON FOR ADMISSION
cardiac biopsy

## 2020-11-17 NOTE — PROGRESS NOTE ADULT - ASSESSMENT
Mr. Baez is a 70 year old man with history of NICM, s/p heart transplantation on 2/23/18. 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 He more recently developed autoimmune hemolytic anemia, complicated by Enterobacter bacteremia and severe C. diff colitis as well as low level CMV viremia for which he was started Valcyte with most recent CMV PCR from 11/20 undetectable. He presented 11/13 for EMB given new findings of graft dysfunction but is overall feeling well. Noted to have normal filling pressures on RHC. Final pathology pending on EMB. SCr downtrending off diuretics, not yet back to baseline. Tacro level stable from yesterday, slightly supratherapeutic.     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

## 2020-11-17 NOTE — PROGRESS NOTE ADULT - ATTENDING COMMENTS
Biopsy 0R (IHC/IF pending) and DSAs negative. He feels well and without complaints. Will plan for angiogram with IVUS early next week, preferably Monday if there is availability.
Will attempt to arrange LHC with IVUS. Tac slightly supratherapeutic but will continue for now pending IF.
meds: asa, valcyte 450, Vit D, toprol Xl 50, PPi, Statin, pred 5, prograf 5.5 bid (13.7, 10, 11.2), cellcept off.   HR , 103/-140/, afebrile  I/O:   11-17  137  |  103  |  25<H>  ----------------------------<  79  4.7   |  23  |  1.29  Ca    9.5      17 Nov 2020 07:40  Mg     1.8     11-17                        13.1   2.89  )-----------( 185      ( 17 Nov 2020 07:40 )             40.2   Path reviewed at todays conference. Patient had C4D staining by immunohistochemistry and IF after transplant that disappeared by the end 2018. This recent biopsy shows diffuse capillary C4D byy immunohistochemistry staining and focal IF C4D staining. There is no HE evidence for humoral rejection. 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 IVUS dose not show intimal thickening. There is possibility of arteritis in the donor or recipient, this could cause a regional steal syndrome. There is also possibility of new AMR, however HE is not supportive.   Plan:   WIll review all angiograms.   Will d/c today for repeat Bx and echo in 2 weeks   Likely to need ritoxan. Will discuss with transplant team.   Marvin Campbell
no events over weekend.   Biopsy from friday: no cellular rejection )R . No perivascular inflamation. Positive C4D staining (chronic).   DSA negative.   meds: asa, valcute 450, vanco 125 QD, Ca, Vit D, Dranabol, Folic, PPI, Statin, Pred 5, Prograf 5.5 bid (10, 11.2, 11.4), off cellcept,   -120, 108/-127/, Afebrile  11-16    138  |  105  |  34<H>  ----------------------------<  88  4.5   |  23  |  1.55<H>  Ca    9.0      16 Nov 2020 07:47                        12.5   3.06  )-----------( 189      ( 16 Nov 2020 07:47 )             39.0   New mild graft dysfunction without evidence for cellular rejection. There is C4D staining which is chronic, however there is no HE evidence for humoral rejection, await IF.   Plan:  For angiogram/IVUS today.  Review path  d/c tomorrow if angiogram   Marvin Campbell

## 2020-11-17 NOTE — DISCHARGE NOTE PROVIDER - NSDCCPCAREPLAN_GEN_ALL_CORE_FT
PRINCIPAL DISCHARGE DIAGNOSIS  Diagnosis: Heart transplant recipient  Assessment and Plan of Treatment: admitted for decrease in EF & to r/o rejection  cardiac catheterization  take medicaitons as prescribed by transplant team  follow up in 2 weeks with office & for cardiac biopsy  blood work in 1 week - set up by BRENDAN  transplant diet

## 2020-11-17 NOTE — DISCHARGE NOTE NURSING/CASE MANAGEMENT/SOCIAL WORK - NSTRANSFERBELONGINGSDISPO_GEN_A_NUR
3 YEAR WELL CHILD EXAM   [facility]    3 YEAR WELL CHILD EXAM    Oumou is a 3  y.o. 1  m.o. female     History given by Mother    CONCERNS/QUESTIONS: No    IMMUNIZATION: up to date and documented      NUTRITION, ELIMINATION, SLEEP, SOCIAL      NUTRITION HISTORY:   Vegetables? Yes  Fruits? Yes  Meats? Yes  Juice?  Yes,  8 oz per day  Water? Yes  Milk? Yes, Type:  2%    MULTIVITAMIN: No    ELIMINATION:   Toilet trained? No  Has good urine output and has soft BM's? Yes    SLEEP PATTERN:   Sleeps through the night? Yes  Sleeps in bed? Yes  Sleeps with parent? No    SOCIAL HISTORY:   The patient lives at home with parents, sister(s), and does not attend day care. Has 1 siblings.  Is the child exposed to smoke? No    HISTORY     Patient's medications, allergies, past medical, surgical, social and family histories were reviewed and updated as appropriate.    History reviewed. No pertinent past medical history.  There are no active problems to display for this patient.    No past surgical history on file.  Family History   Problem Relation Age of Onset   • No Known Problems Mother    • No Known Problems Father    • No Known Problems Sister      No current outpatient prescriptions on file.     No current facility-administered medications for this visit.      No Known Allergies    REVIEW OF SYSTEMS     Constitutional: Afebrile, good appetite, alert.  HENT: No abnormal head shape, no congestion, no nasal drainage. Denies any headaches or sore throat.   Eyes: Vision appears to be normal.  No crossed eyes.   Respiratory: Negative for any difficulty breathing or chest pain.   Cardiovascular: Negative for changes in color/activity.   Gastrointestinal: Negative for any vomiting, constipation or blood in stool.  Genitourinary: Ample urination.  Musculoskeletal: Negative for any pain or discomfort with movement of extremities.   Skin: Negative for rash or skin infection.  Neurological: Negative for any weakness or  decrease in strength.     Psychiatric/Behavioral: Appropriate for age.     DEVELOPMENTAL SURVEILLANCE :      Engage in imaginative play? Yes  Play in cooperation and share? Yes  Eat independently? Yes   Put on shirt or jacket by herself? Yes  Tells you a story from a book or TV? Yes  Pedal a tricycle? Yes  Jump off a couch or a chair? Yes  Jump forwards? Yes  Draw a single [place]? Yes  Cut with child scissors? Yes  Throws ball overhand? Yes  Use of 3 word sentences? Yes  Speech is understandable 75% of the time to strangers? Yes   Kicks a ball? Yes  Knows one body part? Yes  Knows if boy/girl? Yes  Simple tasks around the house? Yes    SCREENINGS     Visual acuity: Fail  Vision Screening Comments: did not know her shape: Not Indicated  Spot Vision Screen  No results found for: ODSPHEREQ, ODSPHERE, ODCYCLINDR, ODAXIS, OSSPHEREQ, OSSPHERE, OSCYCLINDR, OSAXIS, SPTVSNRSLT      ORAL HEALTH:   Primary water source is deficient in fluoride?  Yes  Oral Fluoride Supplementation recommended? Yes   Cleaning teeth twice a day, daily oral fluoride? Yes  Established dental home? No    SELECTIVE SCREENINGS INDICATED WITH SPECIFIC RISK CONDITIONS:     ANEMIA RISK: (Strict Vegetarian diet? Poverty? Limited food access?) No     LEAD RISK:    Does your child live in or visit a home or  facility with an identified  lead hazard or a home built before 1960 that is in poor repair or was  renovated in the past 6 months? No    TB RISK ASSESMENT:   Has child been diagnosed with AIDS? No  Has family member had a positive TB test? No  Travel to high risk country? No     OBJECTIVE      PHYSICAL EXAM:   Reviewed vital signs and growth parameters in EMR.     BP 94/56 (BP Location: Right arm, Patient Position: Sitting, BP Cuff Size: Adult)   Pulse 117   Temp 36.7 °C (98.1 °F) (Temporal)   Resp 25   Ht 0.914 m (3')   Wt 12.4 kg (27 lb 6.4 oz)   SpO2 97%   BMI 14.86 kg/m²     Blood pressure percentiles are 70.7 % systolic and 77.6 %  diastolic based on the August 2017 AAP Clinical Practice Guideline.    Height - 20 %ile (Z= -0.84) based on CDC 2-20 Years stature-for-age data using vitals from 5/13/2019.  Weight - 13 %ile (Z= -1.11) based on CDC 2-20 Years weight-for-age data using vitals from 5/13/2019.  BMI - 24 %ile (Z= -0.71) based on CDC 2-20 Years BMI-for-age data using vitals from 5/13/2019.    General: This is an alert, active child in no distress.   HEAD: Normocephalic, atraumatic.   EYES: PERRL. No conjunctival infection or discharge.   EARS: TM’s are transparent with good landmarks. Canals are patent.  NOSE: Nares are patent and free of congestion.  MOUTH: Dentition within normal limits.  THROAT: Oropharynx has no lesions, moist mucus membranes, without erythema, tonsils normal.   NECK: Supple, no lymphadenopathy or masses.   HEART: Regular rate and rhythm without murmur. Pulses are 2+ and equal.    LUNGS: Clear bilaterally to auscultation, no wheezes or rhonchi. No retractions or distress noted.  ABDOMEN: Normal bowel sounds, soft and non-tender without hepatomegaly or splenomegaly or masses.   GENITALIA: Normal female genitalia. normal external genitalia, no erythema, no discharge.  Jorge Alberto Stage I.  MUSCULOSKELETAL: Spine is straight. Extremities are without abnormalities. Moves all extremities well with full range of motion.    NEURO: Active, alert, oriented per age.    SKIN: Intact without significant rash or birthmarks. Skin is warm, dry, and pink.     ASSESSMENT AND PLAN     1. Well Child Exam:  Healthy 3  y.o. 1  m.o. old with good growth and development.   2. BMI in normal  range at 24%.      3. Dietary counseling and surveillance      4. Exercise counseling      1. Anticipatory guidance was reviewed as well as healthy lifestyle, including diet and exercise discussed and appropriate.  Bright Futures handout provided.  2. Return to clinic for 4 year well child exam or as needed.  3. Immunizations given today: None.    4. Vaccine  Information statements given for each vaccine if administered. Discussed benefits and side effects of each vaccine with patient and family. Answered all questions of family/patient.   5. Multivitamin with 400iu of Vitamin D po qd.  6. Dental exams twice yearly at established dental home.   with patient

## 2020-11-17 NOTE — DISCHARGE NOTE NURSING/CASE MANAGEMENT/SOCIAL WORK - NSDCVIVACCINE_GEN_ALL_CORE_FT
HepA , 2018/2/7 09:02 , Kathleen Reyes (RN)  Hepatitis B , 2018/2/7 09:09 , Kathleen Reyes (RN)  Hepatitis B , 2018/2/16 19:00 , Brooklynn Coles (RN)  Influenza , 2020/10/2 12:07 , Enriqueta Hassan (RN)

## 2020-11-17 NOTE — DIETITIAN INITIAL EVALUATION ADULT. - CHIEF COMPLAINT
70 year old male with PMHx of NICM, S/p/ HM2, s/p Heart transplant on 2/2018. Recent admitted for hemolutic andemia of unclear etiology c/b C-diff, required TPN. Pt admitted for Right heart cath. 70 year old male with PMHx of NICM, S/p/ HM2, s/p Heart transplant on 2/2018. Recent admitted for hemolytic anaemia of unclear etiology c/b C-diff, required TPN. Pt admitted for Right heart cath.

## 2020-11-17 NOTE — DISCHARGE NOTE PROVIDER - CARE PROVIDERS DIRECT ADDRESSES
[email or web address]

## 2020-11-17 NOTE — DIETITIAN INITIAL EVALUATION ADULT. - ORAL INTAKE PTA/DIET HISTORY
Pt reports a good PO intake PTA.   HHA does all food shopping and cooking at home. Pt states he eats 3 meals daily.

## 2020-11-17 NOTE — DIETITIAN INITIAL EVALUATION ADULT. - PERTINENT LABORATORY DATA
11-17 @ 07:40: Sodium 137, Potassium 4.7, Chloride 103, Calcium 9.5, Magnesium 1.8, Phosphorus --, BUN 25<H>, Creatinine 1.29, BG 79, Alk Phos --, ALT/SGPT --, AST/SGOT --, HbA1c --, Total Protein --, Albumin --, Prealbumin --, Total Bilirubin --, Direct Bilirubin --, Hemoglobin 13.1, Hematocrit 40.2

## 2020-11-17 NOTE — PROGRESS NOTE ADULT - SUBJECTIVE AND OBJECTIVE BOX
Gena Hurtado MD  Cardiology Fellow   [phone]  All Cardiology service information can be found 24/7 on [email or web address], password: [email or web address]    No events overnight    ROS: denies dyspnea, chest pain      Meds:  aspirin enteric coated 81 milliGRAM(s) Oral daily  cholecalciferol 1000 Unit(s) Oral daily  folic acid 1 milliGRAM(s) Oral daily  metoprolol succinate ER 50 milliGRAM(s) Oral daily  pantoprazole    Tablet 40 milliGRAM(s) Oral before breakfast  pravastatin 20 milliGRAM(s) Oral at bedtime  predniSONE   Tablet 5 milliGRAM(s) Oral daily  sodium chloride 0.9%. 500 milliLiter(s) IV Continuous <Continuous>  tacrolimus 5.5 milliGRAM(s) Oral <User Schedule>  valGANciclovir 450 milliGRAM(s) Oral <User Schedule>      T(C): 36.5 (11-17-20 @ 04:48), Max: 36.7 (11-16-20 @ 13:08)  HR: 106 (11-17-20 @ 04:48) (94 - 128)  BP: 124/88 (11-17-20 @ 04:48) (124/88 - 160/93)  RR: 18 (11-17-20 @ 04:48) (13 - 20)  SpO2: 97% (11-17-20 @ 04:48) (95% - 99%)    11-16-20 @ 07:01  -  11-17-20 @ 07:00  --------------------------------------------------------  IN: 220 mL / OUT: 1050 mL / NET: -830 mL    11-17-20 @ 07:01  -  11-17-20 @ 10:16  --------------------------------------------------------  IN: 400 mL / OUT: 150 mL / NET: 250 mL      Exam:  General: No distress. Comfortable.  HEENT: EOM intact.  Neck: Neck supple. JVP not elevated. No masses  Chest: Clear to auscultation bilaterally  CV: Tachycardic, regular. Normal S1 and S2. III/VI SM LLSB. Radial pulses normal. No LE edema  Abdomen: Soft, non-distended, non-tender, normoactive BS  Skin: No rashes or skin breakdown. Warm peripherally  Neurology: Alert and oriented times three. Sensation intact  Psych: Affect normal    11-17    137  |  103  |  25<H>  ----------------------------<  79  4.7   |  23  |  1.29    Ca    9.5      17 Nov 2020 07:40  Mg     1.8     11-17                            13.1   2.89  )-----------( 185      ( 17 Nov 2020 07:40 )             40.2

## 2020-11-17 NOTE — DISCHARGE NOTE PROVIDER - HOSPITAL COURSE
70y AA M with no known implantable devices.  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 Patient was treated w/ prednisone and Rituximab. Admitted  6/16-6/19  for hyperglycemia. Reported to transplant team having one done black tarry stool- instructed to present to [facility] for hemolytic anemia unclear etiology. Treated with Rituximab and Prednisone. As per Dr. Campbell given the potential for CNI inhibitors leading to hemolytic anemia, pt was transitioned to everolimus and stayed on high dose Prednisone. He was admitted for acute anemia ( not hemolytic) . He developed  severe Leukopenia and Klebsiella bacteremia. After tx he developed Severe C diff colitis. He was weaned to lower dose prednisone and the Everolimus was changed to Tacrolimus. At that time pt had lower levels of CMV viremia and was started on Valganciclovir . Pt gets dyspneic with walking >1block , but is largely limited by his chronic knee OA. He has no PND orthopnea, cough or swelling of his legs .  Patient has been following with Hematology outpatient.  Denies CP  N/V diarrhea SOB. Yesterday patient had an echo which revealed a new LV dysfunction here today for biopsy.    11/14/20 - VSS - cardiac bx pending- AS per Dr. Campbell - heart failure MD - pt will need a CT coronaries on Monday - will be Npo after midnight on Sunday evening    11/15 VSS NPO for coronary CT in am as per Dr. Campbell. d/c plan - return home    11/16 HD stable, NPO for C with IVUS to evaluate for transplant coronary vasculopathy. PO vancomycin discontinued today.   11/17 / VSS - pt. cleared for discharge home by transplant team - cardiac bx in 2 weeks

## 2020-11-17 NOTE — PROGRESS NOTE ADULT - PROBLEM SELECTOR PLAN 1
- continue ASA 81 mg and Pravastatin 20 mg qHS for CAV prophylaxis  - s/p EMB 11/13 and DSA sent.  - s/p [facility] with biopsy, showing coronary ectasia  - likely to go home today  - add metop 50 xl qday

## 2020-11-17 NOTE — DISCHARGE NOTE PROVIDER - NSDCPNSUBOBJ_GEN_ALL_CORE
PHYSICAL EXAM      General: NAD, well appearing, in no distress  Neurology: A&O x3, non focal, no neuro deficits. Moves all extremities to command.  CV : s1 s2 RRR, no murmurs, gallops, clicks.   Sternal Wound :  Midsternal wound stable  Lungs: clear to auscultation  Abdomen: soft, nontender, nondistended, positive bowel sounds, +BM 11/13  :    voiding

## 2020-11-17 NOTE — DIETITIAN INITIAL EVALUATION ADULT. - OTHER INFO
Pt report eating well during admission eating % of meals, depending on menu choices. Pt report eating well during admission eating % of meals, depending on menu choices.    Pt only wishes to offer food preferences at this time, chicken legs and vanilla ice cream.   Pt reports feeling good and denies any nutritional questions or concerns at this time.     Weight stable since last Discharge, Pt is currently 158lbs     Pt denies GI distress, chewing/swallowing difficulty. NKFA

## 2020-11-17 NOTE — DISCHARGE NOTE PROVIDER - NSDCMRMEDTOKEN_GEN_ALL_CORE_FT
aspirin 81 mg oral delayed release tablet: 1 tab(s) orally once a day  cholecalciferol oral tablet: 1000 unit(s) orally once a day  folic acid 1 mg oral tablet: 1 tab(s) orally once a day  metoprolol succinate 50 mg oral tablet, extended release: 1 tab(s) orally once a day  pantoprazole 40 mg oral delayed release tablet: 1 tab(s) orally once a day (before a meal)  pravastatin 20 mg oral tablet: 1 tab(s) orally once a day (at bedtime)  predniSONE 5 mg oral tablet: 1 tab(s) orally once a day  tacrolimus 0.5 mg oral capsule: 11 cap(s) orally   valGANciclovir 450 mg oral tablet: 1 tab(s) orally

## 2020-11-24 ENCOUNTER — LABORATORY RESULT (OUTPATIENT)
Age: 70
End: 2020-11-24

## 2020-11-24 LAB
ALBUMIN SERPL ELPH-MCNC: 4.6 G/DL
ALP BLD-CCNC: 93 U/L
ALT SERPL-CCNC: 17 U/L
ANION GAP SERPL CALC-SCNC: 11 MMOL/L
AST SERPL-CCNC: 22 U/L
BASOPHILS # BLD AUTO: 0.03 K/UL
BASOPHILS NFR BLD AUTO: 0.9 %
BILIRUB SERPL-MCNC: 0.8 MG/DL
BUN SERPL-MCNC: 36 MG/DL
CALCIUM SERPL-MCNC: 9.5 MG/DL
CHLORIDE SERPL-SCNC: 106 MMOL/L
CO2 SERPL-SCNC: 22 MMOL/L
CREAT SERPL-MCNC: 1.7 MG/DL
EOSINOPHIL # BLD AUTO: 0.05 K/UL
EOSINOPHIL NFR BLD AUTO: 1.7 %
GLUCOSE SERPL-MCNC: 86 MG/DL
HAPTOGLOB SERPL-MCNC: 108 MG/DL
HCT VFR BLD CALC: 39.8 %
HGB BLD-MCNC: 12.4 G/DL
LYMPHOCYTES # BLD AUTO: 1.14 K/UL
LYMPHOCYTES NFR BLD AUTO: 36.5 %
MAGNESIUM SERPL-MCNC: 2 MG/DL
MAN DIFF?: NORMAL
MCHC RBC-ENTMCNC: 31.1 PG
MCHC RBC-ENTMCNC: 31.2 GM/DL
MCV RBC AUTO: 99.7 FL
MONOCYTES # BLD AUTO: 0.19 K/UL
MONOCYTES NFR BLD AUTO: 6.1 %
NEUTROPHILS # BLD AUTO: 1.54 K/UL
NEUTROPHILS NFR BLD AUTO: 44.3 %
PLATELET # BLD AUTO: 213 K/UL
POTASSIUM SERPL-SCNC: 4.9 MMOL/L
PROT SERPL-MCNC: 7.1 G/DL
RBC # BLD: 3.99 M/UL
RBC # FLD: 15.2 %
SODIUM SERPL-SCNC: 139 MMOL/L
TACROLIMUS SERPL-MCNC: 9.5 NG/ML
WBC # FLD AUTO: 3.11 K/UL

## 2020-11-25 ENCOUNTER — NON-APPOINTMENT (OUTPATIENT)
Age: 70
End: 2020-11-25

## 2020-11-25 LAB — T PALLIDUM AB SER QL IA: NEGATIVE

## 2020-11-26 LAB — EVEROLIMUS BLD-MCNC: <1 NG/ML

## 2020-11-27 ENCOUNTER — OUTPATIENT (OUTPATIENT)
Dept: OUTPATIENT SERVICES | Facility: HOSPITAL | Age: 70
LOS: 1 days | Discharge: HOME | End: 2020-11-27

## 2020-11-27 DIAGNOSIS — Z98.890 OTHER SPECIFIED POSTPROCEDURAL STATES: Chronic | ICD-10-CM

## 2020-11-27 DIAGNOSIS — Z94.1 HEART TRANSPLANT STATUS: Chronic | ICD-10-CM

## 2020-11-27 DIAGNOSIS — D64.9 ANEMIA, UNSPECIFIED: ICD-10-CM

## 2020-12-01 ENCOUNTER — RESULT REVIEW (OUTPATIENT)
Age: 70
End: 2020-12-01

## 2020-12-01 ENCOUNTER — APPOINTMENT (OUTPATIENT)
Dept: HEMATOLOGY ONCOLOGY | Facility: CLINIC | Age: 70
End: 2020-12-01
Payer: MEDICARE

## 2020-12-01 VITALS
OXYGEN SATURATION: 97 % | WEIGHT: 163.8 LBS | DIASTOLIC BLOOD PRESSURE: 93 MMHG | RESPIRATION RATE: 18 BRPM | TEMPERATURE: 97.2 F | HEART RATE: 117 BPM | BODY MASS INDEX: 25.71 KG/M2 | HEIGHT: 66.97 IN | SYSTOLIC BLOOD PRESSURE: 146 MMHG

## 2020-12-01 LAB
BASOPHILS # BLD AUTO: 0 K/UL — SIGNIFICANT CHANGE UP (ref 0–0.2)
BASOPHILS NFR BLD AUTO: 0 % — SIGNIFICANT CHANGE UP (ref 0–2)
EOSINOPHIL # BLD AUTO: 0 K/UL — SIGNIFICANT CHANGE UP (ref 0–0.5)
EOSINOPHIL NFR BLD AUTO: 0 % — SIGNIFICANT CHANGE UP (ref 0–6)
LYMPHOCYTES # BLD AUTO: 1.28 K/UL — SIGNIFICANT CHANGE UP (ref 1–3.3)
LYMPHOCYTES # BLD AUTO: 46 % — HIGH (ref 13–44)
MONOCYTES # BLD AUTO: 0.47 K/UL — SIGNIFICANT CHANGE UP (ref 0–0.9)
MONOCYTES NFR BLD AUTO: 17 % — HIGH (ref 2–14)
NEUTROPHILS # BLD AUTO: 1.03 K/UL — LOW (ref 1.8–7.4)
NEUTROPHILS NFR BLD AUTO: 37 % — LOW (ref 43–77)
NRBC # BLD: 0 /100 — SIGNIFICANT CHANGE UP (ref 0–0)
NRBC # BLD: SIGNIFICANT CHANGE UP /100 WBCS (ref 0–0)
PLAT MORPH BLD: NORMAL — SIGNIFICANT CHANGE UP
RBC BLD AUTO: SIGNIFICANT CHANGE UP

## 2020-12-01 PROCEDURE — 99214 OFFICE O/P EST MOD 30 MIN: CPT

## 2020-12-02 ENCOUNTER — OUTPATIENT (OUTPATIENT)
Dept: OUTPATIENT SERVICES | Facility: HOSPITAL | Age: 70
LOS: 1 days | End: 2020-12-02
Payer: MEDICARE

## 2020-12-02 ENCOUNTER — RESULT REVIEW (OUTPATIENT)
Age: 70
End: 2020-12-02

## 2020-12-02 ENCOUNTER — APPOINTMENT (OUTPATIENT)
Dept: HEART FAILURE | Facility: CLINIC | Age: 70
End: 2020-12-02

## 2020-12-02 ENCOUNTER — APPOINTMENT (OUTPATIENT)
Dept: CV DIAGNOSITCS | Facility: HOSPITAL | Age: 70
End: 2020-12-02

## 2020-12-02 VITALS
HEART RATE: 98 BPM | RESPIRATION RATE: 16 BRPM | SYSTOLIC BLOOD PRESSURE: 145 MMHG | DIASTOLIC BLOOD PRESSURE: 81 MMHG | OXYGEN SATURATION: 96 %

## 2020-12-02 VITALS
HEART RATE: 92 BPM | HEIGHT: 68 IN | OXYGEN SATURATION: 96 % | SYSTOLIC BLOOD PRESSURE: 129 MMHG | DIASTOLIC BLOOD PRESSURE: 96 MMHG | TEMPERATURE: 98 F | RESPIRATION RATE: 16 BRPM | WEIGHT: 162.92 LBS

## 2020-12-02 DIAGNOSIS — Z94.1 HEART TRANSPLANT STATUS: Chronic | ICD-10-CM

## 2020-12-02 DIAGNOSIS — Z94.1 HEART TRANSPLANT STATUS: ICD-10-CM

## 2020-12-02 DIAGNOSIS — Z98.890 OTHER SPECIFIED POSTPROCEDURAL STATES: Chronic | ICD-10-CM

## 2020-12-02 LAB
ALBUMIN SERPL ELPH-MCNC: 4.4 G/DL — SIGNIFICANT CHANGE UP (ref 3.3–5)
ALP SERPL-CCNC: 89 U/L — SIGNIFICANT CHANGE UP (ref 40–120)
ALT FLD-CCNC: 24 U/L — SIGNIFICANT CHANGE UP (ref 10–45)
ANION GAP SERPL CALC-SCNC: 13 MMOL/L — SIGNIFICANT CHANGE UP (ref 5–17)
AST SERPL-CCNC: 31 U/L — SIGNIFICANT CHANGE UP (ref 10–40)
BILIRUB SERPL-MCNC: 0.9 MG/DL — SIGNIFICANT CHANGE UP (ref 0.2–1.2)
BUN SERPL-MCNC: 29 MG/DL — HIGH (ref 7–23)
CALCIUM SERPL-MCNC: 9.9 MG/DL — SIGNIFICANT CHANGE UP (ref 8.4–10.5)
CHLORIDE SERPL-SCNC: 106 MMOL/L — SIGNIFICANT CHANGE UP (ref 96–108)
CO2 SERPL-SCNC: 19 MMOL/L — LOW (ref 22–31)
CREAT SERPL-MCNC: 1.52 MG/DL — HIGH (ref 0.5–1.3)
GLUCOSE SERPL-MCNC: 75 MG/DL — SIGNIFICANT CHANGE UP (ref 70–99)
POTASSIUM SERPL-MCNC: 5.8 MMOL/L — HIGH (ref 3.5–5.3)
POTASSIUM SERPL-SCNC: 5.8 MMOL/L — HIGH (ref 3.5–5.3)
PROT SERPL-MCNC: 7.5 G/DL — SIGNIFICANT CHANGE UP (ref 6–8.3)
SARS-COV-2 RNA SPEC QL NAA+PROBE: SIGNIFICANT CHANGE UP
SODIUM SERPL-SCNC: 138 MMOL/L — SIGNIFICANT CHANGE UP (ref 135–145)
TACROLIMUS SERPL-MCNC: 3.6 NG/ML — SIGNIFICANT CHANGE UP

## 2020-12-02 PROCEDURE — 80197 ASSAY OF TACROLIMUS: CPT

## 2020-12-02 PROCEDURE — C1894: CPT

## 2020-12-02 PROCEDURE — 88342 IMHCHEM/IMCYTCHM 1ST ANTB: CPT | Mod: 26

## 2020-12-02 PROCEDURE — 88307 TISSUE EXAM BY PATHOLOGIST: CPT

## 2020-12-02 PROCEDURE — 99152 MOD SED SAME PHYS/QHP 5/>YRS: CPT

## 2020-12-02 PROCEDURE — C1889: CPT

## 2020-12-02 PROCEDURE — 93505 ENDOMYOCARDIAL BIOPSY: CPT

## 2020-12-02 PROCEDURE — 93505 ENDOMYOCARDIAL BIOPSY: CPT | Mod: 26

## 2020-12-02 PROCEDURE — C1769: CPT

## 2020-12-02 PROCEDURE — 93306 TTE W/DOPPLER COMPLETE: CPT | Mod: 26

## 2020-12-02 PROCEDURE — 99153 MOD SED SAME PHYS/QHP EA: CPT

## 2020-12-02 PROCEDURE — 80053 COMPREHEN METABOLIC PANEL: CPT

## 2020-12-02 PROCEDURE — 88341 IMHCHEM/IMCYTCHM EA ADD ANTB: CPT

## 2020-12-02 PROCEDURE — 88307 TISSUE EXAM BY PATHOLOGIST: CPT | Mod: 26

## 2020-12-02 PROCEDURE — 93005 ELECTROCARDIOGRAM TRACING: CPT

## 2020-12-02 PROCEDURE — 88346 IMFLUOR 1ST 1ANTB STAIN PX: CPT

## 2020-12-02 PROCEDURE — 88346 IMFLUOR 1ST 1ANTB STAIN PX: CPT | Mod: 26

## 2020-12-02 PROCEDURE — 87635 SARS-COV-2 COVID-19 AMP PRB: CPT

## 2020-12-02 PROCEDURE — 93010 ELECTROCARDIOGRAM REPORT: CPT

## 2020-12-02 PROCEDURE — 93306 TTE W/DOPPLER COMPLETE: CPT

## 2020-12-02 PROCEDURE — C1884: CPT

## 2020-12-02 RX ORDER — VALGANCICLOVIR 450 MG/1
1 TABLET, FILM COATED ORAL
Qty: 0 | Refills: 0 | DISCHARGE

## 2020-12-02 RX ORDER — TACROLIMUS 5 MG/1
1 CAPSULE ORAL
Qty: 0 | Refills: 0 | DISCHARGE

## 2020-12-02 RX ORDER — LOSARTAN POTASSIUM 100 MG/1
1 TABLET, FILM COATED ORAL
Qty: 0 | Refills: 0 | DISCHARGE

## 2020-12-02 RX ORDER — CHOLECALCIFEROL (VITAMIN D3) 125 MCG
1 CAPSULE ORAL
Qty: 0 | Refills: 0 | DISCHARGE

## 2020-12-02 RX ORDER — TACROLIMUS 0.5 MG/1
0.5 CAPSULE ORAL
Qty: 60 | Refills: 5 | Status: COMPLETED | COMMUNITY
Start: 2020-11-17 | End: 2020-12-02

## 2020-12-02 NOTE — ASU DISCHARGE PLAN (ADULT/PEDIATRIC) - ASU DC SPECIAL INSTRUCTIONSFT
If your procedure was done through the wrist, for the next 3 days avoid pulling or pushing or repeated movement of that hand and wrist. Do not lift more than 5 pounds.  If your procedure was done through the groin, for the next 5 days, limit climbing stairs, no strenuous activity, pulling, pushing, or straining. Do not lift more than 10 pounds.  Monitor site of procedure for bleeding, pain, swelling, or discharge. Notify MD if symptoms occur. You may shower, but no baths/swimming x 5 days.  See pre-printed discharge instructions.  Follow up with your Cardiologist in 1 - 2 weeks - call for appointment  Follow up with your PMD in 1 - 2 weeks - call for appointment

## 2020-12-02 NOTE — HISTORY OF PRESENT ILLNESS
[FreeTextEntry1] : Mr. Baez is a 70 year old man with past medical history of a nonischemic cardiomyopathy and chronic systolic heart failure s/p HM2 LVAD in June 2017 complicated by recurrent GI hemorrhage and possible pump thrombosis who underwent heart transplant on 2/23/18 with a hepatitis C positive donor. His course was complicated by bilateral IJ/subclavian thrombi, a persistent small right sided pleural effusion, as well as acute on chronic renal failure of unclear etiology. In addition, his post-operative course was complicated by graft dysfunction of unclear etiology and persistent C4d positive staining on endomyocardial biopsy with negative DSAs. He developed joshua evidence of graft dysfunction leading to treatment with plasmapheresis, IVIG, and rituximab. Subsequently in June of 2018 he was found to have an pneumonia, that was ultimately diagnosed as Nocardia. This was successfully treated with therapy completed in August 2019.\par \par In the spring, he was admitted with hemolytic anemia of unclear etiology. There were no clear precipitating factors, such as infection. He was treated with prednisone and Rituximab. Given the potential for CNI inhibitors leading to hemolytic anemia, we transitioned him to everolimus and he remained on high dose prednisone. He was subsequently admitted with acute anemia (not hemolytic). He developed severe leukopenia and Klebsiella bacteremia. Following treatment of this, he developed a severe C. diff colitis infection leading to a prolonged recovery. He was weaned to lower dose prednisone and the everolimus was transitioned back to tacrolimus. At the time of discharge he was found to have low levels of CMV viremia and was started back on valganciclovir. \par \par Pt here today for follow up biopsy and visit. He is looking and feeling well. He was seen by Hematology yesterday no changes, states he appears to be in remission on low dose steroids, questioning whether to continue steroids or switch to cellcept. . Pt reports he has been doing well. He has no abdominal complaints or diarrhea. He gets dyspneic with walking >1 block, but is largely limited by his chronic knee osteoarthritis.  He notes ongoing mild pressure in his b/l shoulders, which was worse when he was admitted initially with hemolytic anemia.  He has no PND, orthopnea, cough, or swelling in the legs.  He has no fevers, chills, sweats, dysuria, hematuria, or headaches.  Appetite good, and sleeping well.

## 2020-12-02 NOTE — DISCUSSION/SUMMARY
[FreeTextEntry1] : 70yrs, DCM. s/p LVAD June 2017. s/p Feb 2018. Hep C positive. \par Major postransplant issue:\par PGD C4d positive  managed with PF/IVIG/Ritoxan. 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 \par Developed hemolytic anemia April 2020.. Lowest Hb 6. Treated with Pred and ritoxan X1. \par Transtioned from prograf to evero/cellcept without prograf for hemolytic anemia possibly related to CNI. . \par Admission June Leukopenia, Klebs bacteremia. cellcept stopped. \par Intermitted transfusions. Evero. High doses of prednisone. Followed hematology. \par Admission August Cdiff collitis. evero stopped. transitioned back to prograf. pred weaned. \par Found to have low levels of CMV viremia at end of d/c. Restarted on PO  Valctye treatment dose for one week. CMV became undetectable. Remains on valcyte 450 QD. Discharged 10.2.20\par  No recent PRBCs. Followed by Dr Rosario hematology. 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 A TTE performed some days later showed new segmental inferior wall dysfunction. Patient admitted for RHC/Bx on 11.13-11.17.\par Normal hemodynamics. 0R. Both immunoflourescence and immunohistochemistry showed evidence for complement deposition. There was no evidence on H/E for vascular injury. Of note patient had prior evidence for complement deposition early after transplant that disappeared. At that time DSA were never elevated however patient had positive AR antibodies (tested at [employer] [facility] [employer]). Of note DSA on 11.13 was negative. 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 IVUS was normal and there was no evidence for TCAD. At that time the decision was to defer treatment for anitbodie mediated rejection and initiate losartan for AR antibodies. \par Today feels well with no new complaints. \par meds: ASA, losartan 25. toprol 50, statin, protonix, pred 5, prograf 6 bid (9.5, 9), off cellcept, valcyte 450 QD. \par HR 94, /84, afebrile\par exam unremarkable. \par RA 7, PA 36/14 25, PA sat 73. \par WBC 2.7 37%, Hb 12.6, Plt 190. Na 138, K 5.6, Cl 106, BUN 29, Cr 1.5\par Angiogram review with Dr Carpenter who agrees there is significant dilation of the LAD prox to fistula and that the fistula should be fixed. \par Plan:\par Full TTE today with definity. \par Schedule /TTE to assess for stress induced WMA. \par Discuss closure of fistula with structural heart and interventional.\par Review todays biopsy. If LV function has not changed and if there is no evidence for vascular injury on H/E would defer ritoxan in view of leukopenia and prior history of infection (Nocardia, Cdif, CMV). \par Maintain goal prograf 8-10, no change in pred while off cellcept. \par Weekly CBC and Chem 7 (may need lokelma). \par Check CMV PCR. \par See one month in clinic. \par Marvin Campbell \par \par \par

## 2020-12-02 NOTE — ASU PATIENT PROFILE, ADULT - PSH
AICD (Automatic Cardioverter/Defibrillator) Present  [employer] [place] with 1 [employer] [place] lead4/1/09- explanted and replaced with [employer] 2 leads on 9/2/09  H/O heart transplant  2/2018  History of Prior Ablation Treatment  for afib  S/P right heart catheterization  biopsy multiple  Status post left hip replacement

## 2020-12-02 NOTE — ASU DISCHARGE PLAN (ADULT/PEDIATRIC) - CARE PROVIDER_API CALL
Marvin Campbell  CARDIOVASCULAR DISEASE  [address] [place] [id number]  Phone: [phone]  Fax: [phone]  Follow Up Time:

## 2020-12-02 NOTE — ASU DISCHARGE PLAN (ADULT/PEDIATRIC) - CALL YOUR DOCTOR IF YOU HAVE ANY OF THE FOLLOWING:
Wound/Surgical Site with redness, or foul smelling discharge or pus/bleeding or swelling/Numbness, tingling, color or temperature change to extremity/Pain not relieved by Medications

## 2020-12-03 LAB — SURGICAL PATHOLOGY STUDY: SIGNIFICANT CHANGE UP

## 2020-12-03 NOTE — H&P CARDIOLOGY - REVIEW OF SYSTEMS
[de-identified] : here for annual exam, doing well, R inguinal hernia , sometimes tender, but reducible
See HPI

## 2020-12-04 LAB — CMV DNA CSF QL NAA+PROBE: SIGNIFICANT CHANGE UP

## 2020-12-04 NOTE — RESULTS/DATA
[FreeTextEntry1] : WBC 2790 Hgb 12.6 Hct 40.5 MCV 99.5 Platelets 190,000 Diff normal. ANC 1030 \par \par

## 2020-12-04 NOTE — HISTORY OF PRESENT ILLNESS
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 Treatment - prednisone/Rituxan x 1\par 2/2018 Cardiac transplant\par Bilateral subclavian thrombosis\par UGI bleed [de-identified] : Feels well. No c/o. No chest pain, SOB, abdominal pain, jaundice, dark urine, melena, BRBPR, fever, night sweats, swollen glands, arthritis, rash. Had flu vaccine.  Being followed by cardiac transplant team.

## 2020-12-04 NOTE — PHYSICAL EXAM
[Restricted in physically strenuous activity but ambulatory and able to carry out work of a light or sedentary nature] : Status 1- Restricted in physically strenuous activity but ambulatory and able to carry out work of a light or sedentary nature, e.g., light house work, office work [Normal] : affect appropriate [de-identified] : RRR. S1S2 [place]. Gr 2/6 holosystolic murmur LLSB to apex. No gallops.

## 2020-12-04 NOTE — CONSULT LETTER
[Dear  ___] : Dear  [unfilled], [Courtesy Letter:] : I had the pleasure of seeing your patient, [unfilled], in my office today. [Please see my note below.] : Please see my note below. [Sincerely,] : Sincerely, [FreeTextEntry3] : Tao Rosario M.D., FACP\par Professor of Medicine\par [employer]\par Associate Chief, Division of Hematology\par [facility]\par [facility]\par [address] [place] [address]\[place] [address]\par [phone]\par \par \par \par

## 2020-12-04 NOTE — ASSESSMENT
[Palliative Care Plan] : not applicable at this time [FreeTextEntry1] : 69 YO M S/P cardiac transplant developed mixed type autoimmune hemolytic anemia while on immunosuppression. Appears to be in remission on low dose prednisone.  \par \par Plan:\par Prednisone 5 mg daily\par Tacrolimus/ ASA per Cardiology\par Discuss immunosuppression with Cardiology - ? continue steroids or switch to Cellcept\par Discuss adding Folic acid 1 mg daily\par To ER prn fever\par RTC 1 month\par

## 2020-12-08 ENCOUNTER — LABORATORY RESULT (OUTPATIENT)
Age: 70
End: 2020-12-08

## 2020-12-08 LAB
ALBUMIN SERPL ELPH-MCNC: 4.2 G/DL
ALP BLD-CCNC: 91 U/L
ALT SERPL-CCNC: 31 U/L
ANION GAP SERPL CALC-SCNC: 13 MMOL/L
AST SERPL-CCNC: 29 U/L
BASOPHILS # BLD AUTO: 0.03 K/UL
BASOPHILS NFR BLD AUTO: 0.9 %
BILIRUB SERPL-MCNC: 0.7 MG/DL
BUN SERPL-MCNC: 28 MG/DL
CALCIUM SERPL-MCNC: 9.4 MG/DL
CHLORIDE SERPL-SCNC: 106 MMOL/L
CO2 SERPL-SCNC: 21 MMOL/L
CREAT SERPL-MCNC: 1.49 MG/DL
EOSINOPHIL # BLD AUTO: 0 K/UL
EOSINOPHIL NFR BLD AUTO: 0 %
GLUCOSE SERPL-MCNC: 85 MG/DL
HCT VFR BLD CALC: 39.7 %
HGB BLD-MCNC: 12.8 G/DL
LYMPHOCYTES # BLD AUTO: 0.95 K/UL
LYMPHOCYTES NFR BLD AUTO: 26.9 %
MAN DIFF?: NORMAL
MCHC RBC-ENTMCNC: 31.1 PG
MCHC RBC-ENTMCNC: 32.2 GM/DL
MCV RBC AUTO: 96.4 FL
MONOCYTES # BLD AUTO: 0.28 K/UL
MONOCYTES NFR BLD AUTO: 7.8 %
NEUTROPHILS # BLD AUTO: 2.24 K/UL
NEUTROPHILS NFR BLD AUTO: 62.6 %
PLATELET # BLD AUTO: 192 K/UL
POTASSIUM SERPL-SCNC: 5.1 MMOL/L
PROT SERPL-MCNC: 6.8 G/DL
RBC # BLD: 4.12 M/UL
RBC # FLD: 15 %
SODIUM SERPL-SCNC: 140 MMOL/L
TACROLIMUS SERPL-MCNC: 9.5 NG/ML
WBC # FLD AUTO: 3.53 K/UL

## 2020-12-09 ENCOUNTER — NON-APPOINTMENT (OUTPATIENT)
Age: 70
End: 2020-12-09

## 2020-12-09 LAB
ALBUMIN SERPL ELPH-MCNC: 4.5 G/DL
ALP BLD-CCNC: 87 U/L
ALT SERPL-CCNC: 19 U/L
ANION GAP SERPL CALC-SCNC: 11 MMOL/L
AST SERPL-CCNC: 24 U/L
BILIRUB SERPL-MCNC: 0.8 MG/DL
BUN SERPL-MCNC: 32 MG/DL
CALCIUM SERPL-MCNC: 9.3 MG/DL
CHLORIDE SERPL-SCNC: 107 MMOL/L
CO2 SERPL-SCNC: 23 MMOL/L
CREAT SERPL-MCNC: 1.65 MG/DL
GLUCOSE SERPL-MCNC: 80 MG/DL
LDH SERPL-CCNC: 249 U/L
MAGNESIUM SERPL-MCNC: 2 MG/DL
POTASSIUM SERPL-SCNC: 5.6 MMOL/L
PROT SERPL-MCNC: 7 G/DL
SODIUM SERPL-SCNC: 141 MMOL/L

## 2020-12-10 ENCOUNTER — APPOINTMENT (OUTPATIENT)
Dept: CV DIAGNOSITCS | Facility: HOSPITAL | Age: 70
End: 2020-12-10

## 2020-12-14 NOTE — PROCEDURE NOTE - NSTIMEOUT_GEN_A_CORE
Patient's first and last name, , procedure, and correct site confirmed prior to the start of procedure. Transaminitis

## 2020-12-15 ENCOUNTER — LABORATORY RESULT (OUTPATIENT)
Age: 70
End: 2020-12-15

## 2020-12-15 LAB
ALBUMIN SERPL ELPH-MCNC: 4.4 G/DL
ALP BLD-CCNC: 94 U/L
ALT SERPL-CCNC: 21 U/L
ANION GAP SERPL CALC-SCNC: 11 MMOL/L
AST SERPL-CCNC: 22 U/L
BASOPHILS # BLD AUTO: 0 K/UL
BASOPHILS NFR BLD AUTO: 0 %
BILIRUB SERPL-MCNC: 0.8 MG/DL
BUN SERPL-MCNC: 30 MG/DL
CALCIUM SERPL-MCNC: 9.2 MG/DL
CHLORIDE SERPL-SCNC: 109 MMOL/L
CO2 SERPL-SCNC: 19 MMOL/L
CREAT SERPL-MCNC: 1.46 MG/DL
EOSINOPHIL # BLD AUTO: 0.11 K/UL
EOSINOPHIL NFR BLD AUTO: 4 %
GLUCOSE SERPL-MCNC: 84 MG/DL
HAPTOGLOB SERPL-MCNC: 111 MG/DL
HCT VFR BLD CALC: 39.5 %
HGB BLD-MCNC: 12.8 G/DL
LYMPHOCYTES # BLD AUTO: 0.84 K/UL
LYMPHOCYTES NFR BLD AUTO: 31 %
MAGNESIUM SERPL-MCNC: 1.9 MG/DL
MAN DIFF?: NORMAL
MCHC RBC-ENTMCNC: 31.2 PG
MCHC RBC-ENTMCNC: 32.4 GM/DL
MCV RBC AUTO: 96.3 FL
MONOCYTES # BLD AUTO: 0.43 K/UL
MONOCYTES NFR BLD AUTO: 16 %
NEUTROPHILS # BLD AUTO: 1.27 K/UL
NEUTROPHILS NFR BLD AUTO: 44 %
PLATELET # BLD AUTO: 212 K/UL
POTASSIUM SERPL-SCNC: 4.8 MMOL/L
PROT SERPL-MCNC: 6.9 G/DL
RBC # BLD: 4.1 M/UL
RBC # FLD: 15 %
SODIUM SERPL-SCNC: 139 MMOL/L
TACROLIMUS SERPL-MCNC: 10.5 NG/ML
WBC # FLD AUTO: 2.7 K/UL

## 2020-12-17 ENCOUNTER — NON-APPOINTMENT (OUTPATIENT)
Age: 70
End: 2020-12-17

## 2020-12-22 ENCOUNTER — APPOINTMENT (OUTPATIENT)
Dept: CV DIAGNOSITCS | Facility: HOSPITAL | Age: 70
End: 2020-12-22

## 2020-12-22 ENCOUNTER — OUTPATIENT (OUTPATIENT)
Dept: OUTPATIENT SERVICES | Facility: HOSPITAL | Age: 70
LOS: 1 days | End: 2020-12-22
Payer: MEDICARE

## 2020-12-22 DIAGNOSIS — I10 ESSENTIAL (PRIMARY) HYPERTENSION: ICD-10-CM

## 2020-12-22 DIAGNOSIS — Z94.1 HEART TRANSPLANT STATUS: Chronic | ICD-10-CM

## 2020-12-22 DIAGNOSIS — Z98.890 OTHER SPECIFIED POSTPROCEDURAL STATES: Chronic | ICD-10-CM

## 2020-12-22 DIAGNOSIS — R00.0 TACHYCARDIA, UNSPECIFIED: ICD-10-CM

## 2020-12-22 DIAGNOSIS — Z94.1 HEART TRANSPLANT STATUS: ICD-10-CM

## 2020-12-22 PROCEDURE — 93018 CV STRESS TEST I&R ONLY: CPT

## 2020-12-22 PROCEDURE — 93350 STRESS TTE ONLY: CPT | Mod: 26

## 2020-12-22 PROCEDURE — 93016 CV STRESS TEST SUPVJ ONLY: CPT

## 2020-12-22 PROCEDURE — 93351 STRESS TTE COMPLETE: CPT

## 2020-12-22 PROCEDURE — 93325 DOPPLER ECHO COLOR FLOW MAPG: CPT

## 2020-12-22 PROCEDURE — 93325 DOPPLER ECHO COLOR FLOW MAPG: CPT | Mod: 26

## 2020-12-22 PROCEDURE — 93320 DOPPLER ECHO COMPLETE: CPT

## 2020-12-22 PROCEDURE — 93320 DOPPLER ECHO COMPLETE: CPT | Mod: 26

## 2020-12-23 NOTE — H&P CARDIOLOGY - NEUROLOGICAL
negative complains of pain/discomfort Alert & oriented; no sensory, motor or coordination deficits, normal reflexes

## 2021-01-08 ENCOUNTER — APPOINTMENT (OUTPATIENT)
Dept: CARDIOLOGY | Facility: CLINIC | Age: 71
End: 2021-01-08

## 2021-01-11 ENCOUNTER — NON-APPOINTMENT (OUTPATIENT)
Age: 71
End: 2021-01-11

## 2021-01-12 ENCOUNTER — NON-APPOINTMENT (OUTPATIENT)
Age: 71
End: 2021-01-12

## 2021-01-15 ENCOUNTER — LABORATORY RESULT (OUTPATIENT)
Age: 71
End: 2021-01-15

## 2021-01-15 LAB
ALBUMIN SERPL ELPH-MCNC: 4.2 G/DL
ALP BLD-CCNC: 93 U/L
ALT SERPL-CCNC: 24 U/L
ANION GAP SERPL CALC-SCNC: 11 MMOL/L
AST SERPL-CCNC: 26 U/L
BASOPHILS # BLD AUTO: 0 K/UL
BASOPHILS NFR BLD AUTO: 0 %
BILIRUB SERPL-MCNC: 1 MG/DL
BUN SERPL-MCNC: 34 MG/DL
CALCIUM SERPL-MCNC: 9.3 MG/DL
CHLORIDE SERPL-SCNC: 107 MMOL/L
CO2 SERPL-SCNC: 23 MMOL/L
CREAT SERPL-MCNC: 1.49 MG/DL
EOSINOPHIL # BLD AUTO: 0.03 K/UL
EOSINOPHIL NFR BLD AUTO: 0.9 %
GLUCOSE SERPL-MCNC: 84 MG/DL
HCT VFR BLD CALC: 39.4 %
HGB BLD-MCNC: 12.7 G/DL
LYMPHOCYTES # BLD AUTO: 0.83 K/UL
LYMPHOCYTES NFR BLD AUTO: 24.1 %
MAGNESIUM SERPL-MCNC: 1.9 MG/DL
MAN DIFF?: NORMAL
MCHC RBC-ENTMCNC: 31.1 PG
MCHC RBC-ENTMCNC: 32.2 GM/DL
MCV RBC AUTO: 96.6 FL
MONOCYTES # BLD AUTO: 0.45 K/UL
MONOCYTES NFR BLD AUTO: 12.9 %
NEUTROPHILS # BLD AUTO: 2.15 K/UL
NEUTROPHILS NFR BLD AUTO: 62.1 %
PLATELET # BLD AUTO: 187 K/UL
POTASSIUM SERPL-SCNC: 4.7 MMOL/L
PROT SERPL-MCNC: 7.4 G/DL
RBC # BLD: 4.08 M/UL
RBC # FLD: 14.9 %
SODIUM SERPL-SCNC: 141 MMOL/L
TACROLIMUS SERPL-MCNC: 10.3 NG/ML
WBC # FLD AUTO: 3.46 K/UL

## 2021-01-19 ENCOUNTER — APPOINTMENT (OUTPATIENT)
Dept: CARDIOLOGY | Facility: CLINIC | Age: 71
End: 2021-01-19

## 2021-01-19 ENCOUNTER — OUTPATIENT (OUTPATIENT)
Dept: OUTPATIENT SERVICES | Facility: HOSPITAL | Age: 71
LOS: 1 days | End: 2021-01-19
Payer: MEDICARE

## 2021-01-19 ENCOUNTER — RESULT REVIEW (OUTPATIENT)
Age: 71
End: 2021-01-19

## 2021-01-19 DIAGNOSIS — I25.41 CORONARY ARTERY ANEURYSM: ICD-10-CM

## 2021-01-19 DIAGNOSIS — Z98.890 OTHER SPECIFIED POSTPROCEDURAL STATES: Chronic | ICD-10-CM

## 2021-01-19 DIAGNOSIS — Z94.1 HEART TRANSPLANT STATUS: Chronic | ICD-10-CM

## 2021-01-19 PROCEDURE — 75574 CT ANGIO HRT W/3D IMAGE: CPT

## 2021-01-19 PROCEDURE — G1004: CPT

## 2021-01-19 PROCEDURE — 75574 CT ANGIO HRT W/3D IMAGE: CPT | Mod: 26,MG

## 2021-01-22 ENCOUNTER — LABORATORY RESULT (OUTPATIENT)
Age: 71
End: 2021-01-22

## 2021-01-22 ENCOUNTER — NON-APPOINTMENT (OUTPATIENT)
Age: 71
End: 2021-01-22

## 2021-01-22 LAB
ALBUMIN SERPL ELPH-MCNC: 4.2 G/DL
ALP BLD-CCNC: 83 U/L
ALT SERPL-CCNC: 20 U/L
ANION GAP SERPL CALC-SCNC: 9 MMOL/L
AST SERPL-CCNC: 24 U/L
BASOPHILS # BLD AUTO: 0 K/UL
BASOPHILS NFR BLD AUTO: 0 %
BILIRUB SERPL-MCNC: 1.2 MG/DL
BUN SERPL-MCNC: 35 MG/DL
CALCIUM SERPL-MCNC: 9.1 MG/DL
CHLORIDE SERPL-SCNC: 107 MMOL/L
CO2 SERPL-SCNC: 24 MMOL/L
CREAT SERPL-MCNC: 1.74 MG/DL
EOSINOPHIL # BLD AUTO: 0 K/UL
EOSINOPHIL NFR BLD AUTO: 0 %
GLUCOSE SERPL-MCNC: 83 MG/DL
HCT VFR BLD CALC: 37.9 %
HGB BLD-MCNC: 12.1 G/DL
LYMPHOCYTES # BLD AUTO: 1.09 K/UL
LYMPHOCYTES NFR BLD AUTO: 33 %
MAN DIFF?: NORMAL
MCHC RBC-ENTMCNC: 30.9 PG
MCHC RBC-ENTMCNC: 31.9 GM/DL
MCV RBC AUTO: 96.7 FL
MONOCYTES # BLD AUTO: 0.15 K/UL
MONOCYTES NFR BLD AUTO: 4.4 %
NEUTROPHILS # BLD AUTO: 1.96 K/UL
NEUTROPHILS NFR BLD AUTO: 54.8 %
PLATELET # BLD AUTO: 189 K/UL
POTASSIUM SERPL-SCNC: 4.8 MMOL/L
PROT SERPL-MCNC: 7.1 G/DL
RBC # BLD: 3.92 M/UL
RBC # FLD: 15.4 %
SODIUM SERPL-SCNC: 140 MMOL/L
TACROLIMUS SERPL-MCNC: 8.6 NG/ML
WBC # FLD AUTO: 3.31 K/UL
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
## 2021-02-18 ENCOUNTER — APPOINTMENT (OUTPATIENT)
Dept: CARDIOLOGY | Facility: CLINIC | Age: 71
End: 2021-02-18
Payer: MEDICARE

## 2021-02-18 VITALS
SYSTOLIC BLOOD PRESSURE: 132 MMHG | HEART RATE: 91 BPM | HEIGHT: 66 IN | BODY MASS INDEX: 26.2 KG/M2 | DIASTOLIC BLOOD PRESSURE: 91 MMHG | OXYGEN SATURATION: 97 % | WEIGHT: 163 LBS

## 2021-02-18 DIAGNOSIS — R07.9 CHEST PAIN, UNSPECIFIED: ICD-10-CM

## 2021-02-18 DIAGNOSIS — R06.02 SHORTNESS OF BREATH: ICD-10-CM

## 2021-02-18 PROCEDURE — 99215 OFFICE O/P EST HI 40 MIN: CPT

## 2021-02-18 PROCEDURE — 93000 ELECTROCARDIOGRAM COMPLETE: CPT

## 2021-02-18 RX ORDER — METOPROLOL TARTRATE 25 MG/1
25 TABLET, FILM COATED ORAL
Qty: 60 | Refills: 1 | Status: DISCONTINUED | COMMUNITY
Start: 2021-01-13 | End: 2021-02-18
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
## 2021-02-26 ENCOUNTER — OUTPATIENT (OUTPATIENT)
Dept: OUTPATIENT SERVICES | Facility: HOSPITAL | Age: 71
LOS: 1 days | Discharge: HOME | End: 2021-02-26

## 2021-02-26 DIAGNOSIS — Z94.1 HEART TRANSPLANT STATUS: Chronic | ICD-10-CM

## 2021-02-26 DIAGNOSIS — Z98.890 OTHER SPECIFIED POSTPROCEDURAL STATES: Chronic | ICD-10-CM

## 2021-02-26 DIAGNOSIS — D64.9 ANEMIA, UNSPECIFIED: ICD-10-CM

## 2021-03-02 ENCOUNTER — RESULT REVIEW (OUTPATIENT)
Age: 71
End: 2021-03-02

## 2021-03-02 ENCOUNTER — APPOINTMENT (OUTPATIENT)
Dept: HEMATOLOGY ONCOLOGY | Facility: CLINIC | Age: 71
End: 2021-03-02
Payer: MEDICARE

## 2021-03-02 VITALS
OXYGEN SATURATION: 95 % | TEMPERATURE: 97.2 F | HEIGHT: 67.13 IN | WEIGHT: 167.77 LBS | RESPIRATION RATE: 14 BRPM | DIASTOLIC BLOOD PRESSURE: 88 MMHG | SYSTOLIC BLOOD PRESSURE: 131 MMHG | HEART RATE: 95 BPM | BODY MASS INDEX: 26.03 KG/M2

## 2021-03-02 LAB
BASOPHILS # BLD AUTO: 0 K/UL — SIGNIFICANT CHANGE UP (ref 0–0.2)
BASOPHILS NFR BLD AUTO: 0 % — SIGNIFICANT CHANGE UP (ref 0–2)
EOSINOPHIL # BLD AUTO: 0.06 K/UL — SIGNIFICANT CHANGE UP (ref 0–0.5)
EOSINOPHIL NFR BLD AUTO: 2 % — SIGNIFICANT CHANGE UP (ref 0–6)
HCT VFR BLD CALC: 39 % — SIGNIFICANT CHANGE UP (ref 39–50)
HGB BLD-MCNC: 12.5 G/DL — LOW (ref 13–17)
LYMPHOCYTES # BLD AUTO: 1.03 K/UL — SIGNIFICANT CHANGE UP (ref 1–3.3)
LYMPHOCYTES # BLD AUTO: 32 % — SIGNIFICANT CHANGE UP (ref 13–44)
MCHC RBC-ENTMCNC: 31.8 PG — SIGNIFICANT CHANGE UP (ref 27–34)
MCHC RBC-ENTMCNC: 32.1 G/DL — SIGNIFICANT CHANGE UP (ref 32–36)
MCV RBC AUTO: 99.2 FL — SIGNIFICANT CHANGE UP (ref 80–100)
MONOCYTES # BLD AUTO: 0.32 K/UL — SIGNIFICANT CHANGE UP (ref 0–0.9)
MONOCYTES NFR BLD AUTO: 10 % — SIGNIFICANT CHANGE UP (ref 2–14)
MYELOCYTES NFR BLD: 1 % — HIGH (ref 0–0)
NEUTROPHILS # BLD AUTO: 1.77 K/UL — LOW (ref 1.8–7.4)
NEUTROPHILS NFR BLD AUTO: 55 % — SIGNIFICANT CHANGE UP (ref 43–77)
NRBC # BLD: 0 /100 — SIGNIFICANT CHANGE UP (ref 0–0)
NRBC # BLD: SIGNIFICANT CHANGE UP /100 WBCS (ref 0–0)
PLAT MORPH BLD: NORMAL — SIGNIFICANT CHANGE UP
PLATELET # BLD AUTO: 166 K/UL — SIGNIFICANT CHANGE UP (ref 150–400)
RBC # BLD: 3.93 M/UL — LOW (ref 4.2–5.8)
RBC # FLD: 15 % — HIGH (ref 10.3–14.5)
RBC BLD AUTO: SIGNIFICANT CHANGE UP
RETICS #: 97.1 K/UL — SIGNIFICANT CHANGE UP (ref 25–125)
RETICS/RBC NFR: 2.5 % — SIGNIFICANT CHANGE UP (ref 0.5–2.5)
WBC # BLD: 3.22 K/UL — LOW (ref 3.8–10.5)
WBC # FLD AUTO: 3.22 K/UL — LOW (ref 3.8–10.5)

## 2021-03-02 PROCEDURE — 99214 OFFICE O/P EST MOD 30 MIN: CPT

## 2021-03-02 RX ORDER — METOPROLOL TARTRATE 25 MG/1
25 TABLET, FILM COATED ORAL AT BEDTIME
Refills: 0 | Status: DISCONTINUED | COMMUNITY
End: 2021-03-02

## 2021-03-02 NOTE — RESULTS/DATA
[FreeTextEntry1] : WBC 3200 Hgb 12.5 Hct 39 Platelets 166,000 Diff normal\par \par 1/22/21 CMP creatinine 1.74\par \par 12/15/20 Haptoglobin 111

## 2021-03-02 NOTE — ASSESSMENT
[FreeTextEntry1] : 69 YO M S/P cardiac transplant developed mixed type autoimmune hemolytic anemia while on immunosuppression. Appears to be in remission on low dose prednisone. \par \par Plan:\par Prednisone 5 mg daily\par Tacrolimus/ ASA per Cardiology\par No objection to tapering/discontinuing prednisone and switching to Cellcept if Transplant team wishes\par Folic acid 1 mg daily\par COVID vaccine if Transplant concurs\par CMP, LDH, retics\par CBC monthly\par To ER prn fever\par RTC 3 months\par \par . \par  [Palliative Care Plan] : not applicable at this time

## 2021-03-02 NOTE — HISTORY OF PRESENT ILLNESS
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 Treatment - prednisone/Rituxan x 1\par 2/2018 Cardiac transplant\par Bilateral subclavian thrombosis\par UGI bleed [de-identified] : Feels well. No c/o. No chest pain, SOB, abdominal pain, jaundice, dark urine, melena, BRBPR, fever, night sweats, swollen glands, arthritis, rash, limb pain/swelling.

## 2021-03-02 NOTE — PHYSICAL EXAM
[Fully active, able to carry on all pre-disease performance without restriction] : Status 0 - Fully active, able to carry on all pre-disease performance without restriction [Normal] : affect appropriate [de-identified] : RRR. S1S2 normak. Gr 2/6 holosystolic murmur LLSB. No gallops.

## 2021-03-08 LAB
ALBUMIN SERPL ELPH-MCNC: 4.5 G/DL
ALP BLD-CCNC: 85 U/L
ALT SERPL-CCNC: 25 U/L
ANION GAP SERPL CALC-SCNC: 11 MMOL/L
AST SERPL-CCNC: 25 U/L
BILIRUB SERPL-MCNC: 0.9 MG/DL
BUN SERPL-MCNC: 29 MG/DL
CALCIUM SERPL-MCNC: 9.2 MG/DL
CHLORIDE SERPL-SCNC: 108 MMOL/L
CO2 SERPL-SCNC: 22 MMOL/L
CREAT SERPL-MCNC: 1.65 MG/DL
GLUCOSE SERPL-MCNC: 80 MG/DL
LDH SERPL-CCNC: 254 U/L
POTASSIUM SERPL-SCNC: 5.7 MMOL/L
PROT SERPL-MCNC: 7 G/DL
SODIUM SERPL-SCNC: 140 MMOL/L

## 2021-03-09 ENCOUNTER — NON-APPOINTMENT (OUTPATIENT)
Age: 71
End: 2021-03-09

## 2021-03-09 PROBLEM — R07.9 CHEST PAIN: Status: ACTIVE | Noted: 2021-03-09

## 2021-03-09 PROBLEM — R06.02 SHORTNESS OF BREATH: Status: ACTIVE | Noted: 2021-03-09

## 2021-03-09 NOTE — HISTORY OF PRESENT ILLNESS
[FreeTextEntry1] : Mr. Baez is a 70 year old man with past medical history of a nonischemic cardiomyopathy and chronic systolic heart failure s/p HM2 LVAD in June 2017 complicated by recurrent GI hemorrhage and possible pump thrombosis who underwent heart transplant on 2/23/18 with a hepatitis C positive donor. His course was complicated by bilateral IJ/subclavian thrombi, a persistent small right sided pleural effusion, as well as acute on chronic renal failure of unclear etiology. In addition, his post-operative course was complicated by graft dysfunction of unclear etiology and persistent C4d positive staining on endomyocardial biopsy with negative DSAs. He developed joshua evidence of graft dysfunction leading to treatment with plasmapheresis, IVIG, and rituximab. Subsequently in June of 2018 he was found to have an pneumonia, that was ultimately diagnosed as Nocardia. This was successfully treated with therapy completed in August.\par \par In the spring, he was admitted with hemolytic anemia of unclear etiology. There were no clear precipitating factors, such as infection. He was treated with prednisone and Rituximab. Given the potential for CNI inhibitors leading to hemolytic anemia, we transitioned him to everolimus and he remained on high dose prednisone. He was subsequently admitted with acute anemia (not hemolytic). He developed severe leukopenia and Klebsiella bacteremia. Following treatment of this, he developed a severe C. diff colitis infection leading to a prolonged recovery. He was weaned to lower dose prednisone and the everolimus was transitioned back to tacrolimus. At the time of discharge he was found to have low levels of CMV viremia and was started back on valganciclovir. \par \par The patient now presents following his cardiac catheterization, stress test and coronary angiogram.  He has been experiencing intermittently over the last year increasing dyspnea upon exertion, fatigue and periodic episodes of chest discomfort.  He denies any fevers, chills, sweats, light-headed sensation, dizziness or palpitations.  He is taking all his medications as prescribed.  Denies any bleeding issues.\par

## 2021-03-09 NOTE — REVIEW OF SYSTEMS
[Feeling Fatigued] : feeling fatigued [Shortness Of Breath] : shortness of breath [Dyspnea on exertion] : dyspnea during exertion [Chest Pain] : chest pain [Negative] : Heme/Lymph [Fever] : no fever [Headache] : no headache [Recent Weight Gain (___ Lbs)] : no recent weight gain [Chills] : no chills [Recent Weight Loss (___ Lbs)] : no recent weight loss [Chest  Pressure] : no chest pressure [Lower Ext Edema] : no extremity edema [Leg Claudication] : no intermittent leg claudication [Palpitations] : no palpitations

## 2021-03-09 NOTE — PHYSICAL EXAM
[General Appearance - Well Developed] : well developed [Normal Appearance] : normal appearance [Well Groomed] : well groomed [General Appearance - Well Nourished] : well nourished [No Deformities] : no deformities [General Appearance - In No Acute Distress] : no acute distress [Normal Conjunctiva] : the conjunctiva exhibited no abnormalities [Eyelids - No Xanthelasma] : the eyelids demonstrated no xanthelasmas [Normal Oral Mucosa] : normal oral mucosa [No Oral Pallor] : no oral pallor [No Oral Cyanosis] : no oral cyanosis [Normal Jugular Venous A Waves Present] : normal jugular venous A waves present [Normal Jugular Venous V Waves Present] : normal jugular venous V waves present [No Jugular Venous Donnelly A Waves] : no jugular venous donnelly A waves [Heart Rate And Rhythm] : heart rate and rhythm were normal [Heart Sounds] : normal S1 and S2 [Murmurs] : no murmurs present [Respiration, Rhythm And Depth] : normal respiratory rhythm and effort [Exaggerated Use Of Accessory Muscles For Inspiration] : no accessory muscle use [Auscultation Breath Sounds / Voice Sounds] : lungs were clear to auscultation bilaterally [Abdomen Soft] : soft [Abdomen Tenderness] : non-tender [Abdomen Mass (___ Cm)] : no abdominal mass palpated [Abnormal Walk] : normal gait [Gait - Sufficient For Exercise Testing] : the gait was sufficient for exercise testing [Nail Clubbing] : no clubbing of the fingernails [Cyanosis, Localized] : no localized cyanosis [Petechial Hemorrhages (___cm)] : no petechial hemorrhages [Skin Color & Pigmentation] : normal skin color and pigmentation [] : no rash [No Venous Stasis] : no venous stasis [Skin Lesions] : no skin lesions [No Skin Ulcers] : no skin ulcer [No Xanthoma] : no  xanthoma was observed [Oriented To Time, Place, And Person] : oriented to person, place, and time [Affect] : the affect was normal [Mood] : the mood was normal [No Anxiety] : not feeling anxious

## 2021-03-15 ENCOUNTER — LABORATORY RESULT (OUTPATIENT)
Age: 71
End: 2021-03-15

## 2021-03-15 LAB
ALBUMIN SERPL ELPH-MCNC: 4.1 G/DL
ALP BLD-CCNC: 72 U/L
ALT SERPL-CCNC: 18 U/L
ANION GAP SERPL CALC-SCNC: 10 MMOL/L
AST SERPL-CCNC: 21 U/L
BASOPHILS # BLD AUTO: 0 K/UL
BASOPHILS NFR BLD AUTO: 0 %
BILIRUB SERPL-MCNC: 0.7 MG/DL
BUN SERPL-MCNC: 33 MG/DL
CALCIUM SERPL-MCNC: 9.6 MG/DL
CHLORIDE SERPL-SCNC: 106 MMOL/L
CO2 SERPL-SCNC: 22 MMOL/L
CREAT SERPL-MCNC: 1.51 MG/DL
EOSINOPHIL # BLD AUTO: 0.05 K/UL
EOSINOPHIL NFR BLD AUTO: 1.7 %
GLUCOSE SERPL-MCNC: 82 MG/DL
HCT VFR BLD CALC: 38.5 %
HGB BLD-MCNC: 12 G/DL
LYMPHOCYTES # BLD AUTO: 0.89 K/UL
LYMPHOCYTES NFR BLD AUTO: 29.8 %
MAGNESIUM SERPL-MCNC: 1.9 MG/DL
MAN DIFF?: NORMAL
MCHC RBC-ENTMCNC: 31.2 GM/DL
MCHC RBC-ENTMCNC: 32.2 PG
MCV RBC AUTO: 103.2 FL
MONOCYTES # BLD AUTO: 0.23 K/UL
MONOCYTES NFR BLD AUTO: 7.9 %
NEUTROPHILS # BLD AUTO: 1.67 K/UL
NEUTROPHILS NFR BLD AUTO: 56.1 %
PLATELET # BLD AUTO: 161 K/UL
POTASSIUM SERPL-SCNC: 5.6 MMOL/L
PROT SERPL-MCNC: 7.1 G/DL
RBC # BLD: 3.73 M/UL
RBC # FLD: 15.1 %
SODIUM SERPL-SCNC: 138 MMOL/L
TACROLIMUS SERPL-MCNC: 9.6 NG/ML
WBC # FLD AUTO: 2.97 K/UL

## 2021-03-17 LAB — CMV DNA SPEC QL NAA+PROBE: NOT DETECTED IU/ML

## 2021-03-22 ENCOUNTER — LABORATORY RESULT (OUTPATIENT)
Age: 71
End: 2021-03-22

## 2021-03-22 ENCOUNTER — NON-APPOINTMENT (OUTPATIENT)
Age: 71
End: 2021-03-22

## 2021-03-22 ENCOUNTER — APPOINTMENT (OUTPATIENT)
Dept: HEART FAILURE | Facility: CLINIC | Age: 71
End: 2021-03-22
Payer: MEDICARE

## 2021-03-22 VITALS
HEART RATE: 112 BPM | OXYGEN SATURATION: 98 % | TEMPERATURE: 97.9 F | DIASTOLIC BLOOD PRESSURE: 68 MMHG | BODY MASS INDEX: 26.06 KG/M2 | SYSTOLIC BLOOD PRESSURE: 104 MMHG | HEIGHT: 67.13 IN | WEIGHT: 168 LBS | RESPIRATION RATE: 16 BRPM

## 2021-03-22 LAB
ALBUMIN SERPL ELPH-MCNC: 4.3 G/DL
ALP BLD-CCNC: 88 U/L
ALT SERPL-CCNC: 44 U/L
AST SERPL-CCNC: 40 U/L
BASOPHILS # BLD AUTO: 0.02 K/UL
BASOPHILS NFR BLD AUTO: 0.9 %
BILIRUB SERPL-MCNC: 1 MG/DL
BUN SERPL-MCNC: 30 MG/DL
CALCIUM SERPL-MCNC: 9 MG/DL
CHLORIDE SERPL-SCNC: 107 MMOL/L
CO2 SERPL-SCNC: 20 MMOL/L
CREAT SERPL-MCNC: 1.55 MG/DL
EOSINOPHIL # BLD AUTO: 0.06 K/UL
EOSINOPHIL NFR BLD AUTO: 2.6 %
GLUCOSE SERPL-MCNC: 118 MG/DL
HCT VFR BLD CALC: 38.2 %
HGB BLD-MCNC: 12.2 G/DL
LYMPHOCYTES # BLD AUTO: 0.87 K/UL
LYMPHOCYTES NFR BLD AUTO: 36.8 %
MAN DIFF?: NORMAL
MCHC RBC-ENTMCNC: 31.9 GM/DL
MCHC RBC-ENTMCNC: 32 PG
MCV RBC AUTO: 100.3 FL
MONOCYTES # BLD AUTO: 0.25 K/UL
MONOCYTES NFR BLD AUTO: 10.5 %
NEUTROPHILS # BLD AUTO: 1.1 K/UL
NEUTROPHILS NFR BLD AUTO: 44.7 %
PLATELET # BLD AUTO: 154 K/UL
POTASSIUM SERPL-SCNC: 4.6 MMOL/L
PROT SERPL-MCNC: 7.2 G/DL
RBC # BLD: 3.81 M/UL
RBC # FLD: 14.9 %
SODIUM SERPL-SCNC: 139 MMOL/L
WBC # FLD AUTO: 2.37 K/UL

## 2021-03-22 PROCEDURE — 93000 ELECTROCARDIOGRAM COMPLETE: CPT

## 2021-03-22 PROCEDURE — 99214 OFFICE O/P EST MOD 30 MIN: CPT

## 2021-03-22 NOTE — DISCUSSION/SUMMARY
[FreeTextEntry1] : see my detailed note from dec 2. \par Of note last biopsy at that visit was OR, IF negative. \par s/p CT angio that confirmed LAD-RV fisuta.\par s/p  echo 12.22; LVEDD 4.9. LVEF 50-55. miid ant mildly hypokinetic. normal augmentation with , no evidence of inducible ischemia.  (20ug ). \par no SOBOE. walks 20 mins outside. no orthopnea. BP controlled. \par meds: asa, folic acid. losartan 25, toprol 75, PPI, statin, pred 5, prograf 6.5 bid (9.6), vanco 450 bid. \par 104/68, 112, afebrile. 168lbs. \par Cr 1.5, K 5.6, WBC 2.3 (2.9, 3.3, 2.7), Hb 12, Plt 161. \par CMV PCR neg\par leukopenia off cellcept in patient with history of CMV. \par Dr Rosario feels comfortable reducing pred with respect to hem anemia. \par discussed structural heart procedure at length with patient. While the procedure carries risk, he understands that it is important to attempt to address the shunt prior to any further impact on LV function or coronary anatomy. He is anxious but agreeable to procede.\par plan;\par schedule percutaneous fisutla closure. \par d/c valcyte. check CMV PCR. repeat labs next week. \par if WBC recovers initiate low level cellcept and wean pred. \par no change in prograf for now. \par Marvin Campbell \par \par \par

## 2021-03-22 NOTE — HISTORY OF PRESENT ILLNESS
[FreeTextEntry1] : Mr. Baez is a 70 year old man with past medical history of a nonischemic cardiomyopathy and chronic systolic heart failure s/p HM2 LVAD in June 2017 complicated by recurrent GI hemorrhage and possible pump thrombosis who underwent heart transplant on 2/23/18 with a hepatitis C positive donor. His course was complicated by bilateral IJ/subclavian thrombi, a persistent small right sided pleural effusion, as well as acute on chronic renal failure of unclear etiology. In addition, his post-operative course was complicated by graft dysfunction of unclear etiology and persistent C4d positive staining on endomyocardial biopsy with negative DSAs. He developed joshua evidence of graft dysfunction leading to treatment with plasmapheresis, IVIG, and rituximab. Subsequently in June of 2018 he was found to have an pneumonia, that was ultimately diagnosed as Nocardia. This was successfully treated with therapy completed in August 2019.\par \par In the spring, he was admitted with hemolytic anemia of unclear etiology. There were no clear precipitating factors, such as infection. He was treated with prednisone and Rituximab. Given the potential for CNI inhibitors leading to hemolytic anemia, we transitioned him to everolimus and he remained on high dose prednisone. He was subsequently admitted with acute anemia (not hemolytic). He developed severe leukopenia and Klebsiella bacteremia. Following treatment of this, he developed a severe C. diff colitis infection leading to a prolonged recovery. He was weaned to lower dose prednisone and the everolimus was transitioned back to tacrolimus. At the time of discharge he was found to have low levels of CMV viremia and was started back on valganciclovir. \par \par Here today for follow up visit. He is looking and feeling well. He was recently seen by Hematology no changes, states he appears to be in remission on low dose steroids, questioning whether to continue steroids or switch to cellcept. . Pt reports he has been doing well. He has no abdominal complaints or diarrhea. He gets dyspneic with walking >1 block, but is largely limited by his chronic knee osteoarthritis.   He has no PND, orthopnea, cough, or swelling in the legs.  He has no fevers, chills, sweats, dysuria, hematuria, or headaches.  Appetite good, and sleeping well.

## 2021-03-24 LAB — CMV DNA SPEC QL NAA+PROBE: NOT DETECTED IU/ML

## 2021-03-26 NOTE — PATIENT PROFILE ADULT. - NS MD HP PRESSURE ULCER DRAIN
no

## 2021-03-29 ENCOUNTER — LABORATORY RESULT (OUTPATIENT)
Age: 71
End: 2021-03-29

## 2021-03-29 LAB
ALBUMIN SERPL ELPH-MCNC: 4.3 G/DL
ALP BLD-CCNC: 77 U/L
ALT SERPL-CCNC: 19 U/L
ANION GAP SERPL CALC-SCNC: 10 MMOL/L
AST SERPL-CCNC: 25 U/L
BASOPHILS # BLD AUTO: 0 K/UL
BASOPHILS NFR BLD AUTO: 0 %
BILIRUB SERPL-MCNC: 0.9 MG/DL
BUN SERPL-MCNC: 29 MG/DL
CALCIUM SERPL-MCNC: 9.1 MG/DL
CHLORIDE SERPL-SCNC: 109 MMOL/L
CO2 SERPL-SCNC: 21 MMOL/L
CREAT SERPL-MCNC: 1.49 MG/DL
EOSINOPHIL # BLD AUTO: 0.03 K/UL
EOSINOPHIL NFR BLD AUTO: 0.9 %
GLUCOSE SERPL-MCNC: 82 MG/DL
HCT VFR BLD CALC: 39.1 %
HGB BLD-MCNC: 11.9 G/DL
LYMPHOCYTES # BLD AUTO: 1.07 K/UL
LYMPHOCYTES NFR BLD AUTO: 36.8 %
MAGNESIUM SERPL-MCNC: 2 MG/DL
MAN DIFF?: NORMAL
MCHC RBC-ENTMCNC: 30.4 GM/DL
MCHC RBC-ENTMCNC: 31.7 PG
MCV RBC AUTO: 104.3 FL
MONOCYTES # BLD AUTO: 0.59 K/UL
MONOCYTES NFR BLD AUTO: 20.2 %
NEUTROPHILS # BLD AUTO: 1.15 K/UL
NEUTROPHILS NFR BLD AUTO: 37.7 %
PLATELET # BLD AUTO: 155 K/UL
POTASSIUM SERPL-SCNC: 5 MMOL/L
PROT SERPL-MCNC: 7.2 G/DL
RBC # BLD: 3.75 M/UL
RBC # FLD: 14.7 %
SODIUM SERPL-SCNC: 140 MMOL/L
TACROLIMUS SERPL-MCNC: 10.1 NG/ML
WBC # FLD AUTO: 2.91 K/UL

## 2021-03-30 RX ORDER — METOPROLOL TARTRATE 25 MG/1
25 TABLET, FILM COATED ORAL
Refills: 0 | Status: DISCONTINUED | COMMUNITY
Start: 2021-03-02 | End: 2021-03-30

## 2021-03-31 NOTE — PROGRESS NOTE ADULT - ASSESSMENT
69 yo M s/p heart transplant complicated by rejection with rounds of plasmaphoresis , IVIG, rituximab x2, known colonizer with  Pseudomonas was treated for PNA in June 2018  and again in August 14-30 with cefepime and voriconazole as well as c.diff with PO vanco presented this admission with decreased PO intake, 4 watery BM daily and a productive cough. Pt had CT guided R lung mass bx on 8/17 which showed acute inflammation but was negative bacterial or fungal etiology. 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     Suggest:  - Continue IV Imipenem inpatient and at SNF  - would repeat chest CT imaging next week to check for improvement in infiltrates  -  Bactrim changed to Mepron in the setting of hyperkalemia  - check CMV viral load weekly while off ganciclovir- contradictory specimens sent the same day, will resend  - continue Vanco taper for C.diff (125 mg orally twice daily for 7 days from 9/20-9/26 , then 125 mg orally once daily for 7 days 9/27-10/3, then 125 mg orally every 2 or 3 days for 8 weeks)  -HCV remains in remission    Gary Lujan MD  [phone]  After 5pm/weekends [phone] Oxybutynin Counseling:  I discussed with the patient the risks of oxybutynin including but not limited to skin rash, drowsiness, dry mouth, difficulty urinating, and blurred vision.

## 2021-04-02 LAB — CMV DNA SPEC QL NAA+PROBE: NOT DETECTED IU/ML

## 2021-04-02 RX ORDER — VALGANCICLOVIR HYDROCHLORIDE 450 MG/1
450 TABLET ORAL
Qty: 30 | Refills: 5 | Status: DISCONTINUED | COMMUNITY
Start: 2020-10-28 | End: 2021-04-02

## 2021-04-16 NOTE — ED ADULT TRIAGE NOTE - NS ED TRIAGE AVPU SCALE
up appointment(s): ABHAY    Non-face-to-face services provided:  Obtained and reviewed discharge summary and/or continuity of care documents    Wears smart watch and has BP cuff. Instructed to monitor BP daily. Take meds as scheduled at same time daily. Change positions slowly. Reports symptoms to cardiology. She denies needs today. She declines [facility] COVID vaccine scheduling number at this time. CTN provided contact information for future needs. Plan for follow-up call in 7-14 days based on severity of symptoms and risk factors.     Sally Driver RN  Care Transitions Nurse  [phone] mobile Alert-The patient is alert, awake and responds to voice. The patient is oriented to time, place, and person. The triage nurse is able to obtain subjective information.

## 2021-04-20 NOTE — PROGRESS NOTE ADULT - PROBLEM SELECTOR PLAN 1
-C/w statin for TCAD ppx  -ASA on hold given GIB  -C/w everolimus 0.5mg PO BID; goal 8-10. Level pending  -Steroid taper as written   -C/w atovaquone and posaconazole for ppx; Bactrim and Valcyte on hold given leukopenia  -Off Cellcept while on high dose steroids and now Cdiff infxn  -Would maintain net even for today  -Check BMP BID  -Await repeat CMV PCR Bloch -C/w statin for TCAD ppx  -ASA on hold given GIB  - on everolimus 0.5mg PO BID; goal 8-10. Level pending; will reduce to 0.25 mg q12 for now in setting of severe infection  -Steroid taper as written   -C/w atovaquone and posaconazole for ppx; Bactrim and Valcyte on hold given leukopenia  -Off Cellcept while on high dose steroids and now Cdiff infxn  -Would maintain net even for today  -Check BMP BID  -Await repeat CMV PCR

## 2021-04-26 NOTE — OCCUPATIONAL THERAPY INITIAL EVALUATION ADULT - GROOMING, PREVIOUS LEVEL OF FUNCTION, OT EVAL
Detail Level: Simple
Additional Notes: Patient consent was obtained to proceed with the visit and recommended plan of care after discussion of all risks and benefits, including the risks of COVID-19 exposure.
independent

## 2021-05-11 ENCOUNTER — LABORATORY RESULT (OUTPATIENT)
Age: 71
End: 2021-05-11

## 2021-05-11 LAB
ALBUMIN SERPL ELPH-MCNC: 4.2 G/DL
ALP BLD-CCNC: 75 U/L
ALT SERPL-CCNC: 31 U/L
ANION GAP SERPL CALC-SCNC: 11 MMOL/L
AST SERPL-CCNC: 27 U/L
BASOPHILS # BLD AUTO: 0 K/UL
BASOPHILS NFR BLD AUTO: 0 %
BILIRUB SERPL-MCNC: 0.9 MG/DL
BUN SERPL-MCNC: 31 MG/DL
CALCIUM SERPL-MCNC: 9.1 MG/DL
CHLORIDE SERPL-SCNC: 112 MMOL/L
CO2 SERPL-SCNC: 19 MMOL/L
CREAT SERPL-MCNC: 1.67 MG/DL
EOSINOPHIL # BLD AUTO: 0 K/UL
EOSINOPHIL NFR BLD AUTO: 0 %
GLUCOSE SERPL-MCNC: 85 MG/DL
HCT VFR BLD CALC: 34.7 %
HGB BLD-MCNC: 11.2 G/DL
LYMPHOCYTES # BLD AUTO: 0.81 K/UL
LYMPHOCYTES NFR BLD AUTO: 17.5 %
MAGNESIUM SERPL-MCNC: 1.9 MG/DL
MAN DIFF?: NORMAL
MCHC RBC-ENTMCNC: 32.3 GM/DL
MCHC RBC-ENTMCNC: 33 PG
MCV RBC AUTO: 102.4 FL
MONOCYTES # BLD AUTO: 0.57 K/UL
MONOCYTES NFR BLD AUTO: 12.3 %
NEUTROPHILS # BLD AUTO: 2.99 K/UL
NEUTROPHILS NFR BLD AUTO: 61.4 %
PLATELET # BLD AUTO: 148 K/UL
POTASSIUM SERPL-SCNC: 5.2 MMOL/L
PROT SERPL-MCNC: 6.9 G/DL
RBC # BLD: 3.39 M/UL
RBC # FLD: 14.2 %
SODIUM SERPL-SCNC: 142 MMOL/L
TACROLIMUS SERPL-MCNC: 9.3 NG/ML
WBC # FLD AUTO: 4.6 K/UL

## 2021-05-14 LAB — CMV DNA SPEC QL NAA+PROBE: NOT DETECTED IU/ML

## 2021-05-20 NOTE — PROCEDURE NOTE - NSINFORMCONSENT_GEN_A_CORE
Benefits, risks, and possible complications of procedure explained to patient/caregiver who verbalized understanding and gave verbal consent. done

## 2021-05-21 LAB
ALBUMIN SERPL ELPH-MCNC: 4.3 G/DL
ALP BLD-CCNC: 72 U/L
ALT SERPL-CCNC: 32 U/L
ANION GAP SERPL CALC-SCNC: 10 MMOL/L
AST SERPL-CCNC: 24 U/L
BILIRUB SERPL-MCNC: 0.9 MG/DL
BUN SERPL-MCNC: 29 MG/DL
CALCIUM SERPL-MCNC: 9.2 MG/DL
CHLORIDE SERPL-SCNC: 108 MMOL/L
CO2 SERPL-SCNC: 22 MMOL/L
CREAT SERPL-MCNC: 1.53 MG/DL
GLUCOSE SERPL-MCNC: 90 MG/DL
POTASSIUM SERPL-SCNC: 4.7 MMOL/L
PROT SERPL-MCNC: 6.6 G/DL
SODIUM SERPL-SCNC: 140 MMOL/L

## 2021-05-28 NOTE — PROGRESS NOTE ADULT - PROBLEM SELECTOR PLAN 4
[FreeTextEntry1] : \par TODAY:\par Patient returns for continuation of care. Reports concerns of still with limited neck mobility postop despite extensive supervised physical therapy\par The neck stiffness does not "feel muscular"  and he continues to be bothered by numbness and tingling in his hands and fingers.\par Reports difficulty with neck extension limiting his ability to drink from a cup.\par He comes to determine the next steps in his care\par Denies any new neurological deficits.\par \par  Trend BMP daily  Avoid nephrotoxic medications

## 2021-06-08 ENCOUNTER — RESULT REVIEW (OUTPATIENT)
Age: 71
End: 2021-06-08

## 2021-06-08 ENCOUNTER — OUTPATIENT (OUTPATIENT)
Dept: OUTPATIENT SERVICES | Facility: HOSPITAL | Age: 71
LOS: 1 days | Discharge: HOME | End: 2021-06-08

## 2021-06-08 ENCOUNTER — APPOINTMENT (OUTPATIENT)
Dept: HEMATOLOGY ONCOLOGY | Facility: CLINIC | Age: 71
End: 2021-06-08
Payer: MEDICARE

## 2021-06-08 VITALS
HEIGHT: 67.13 IN | OXYGEN SATURATION: 98 % | SYSTOLIC BLOOD PRESSURE: 141 MMHG | WEIGHT: 170.86 LBS | BODY MASS INDEX: 26.5 KG/M2 | HEART RATE: 97 BPM | RESPIRATION RATE: 15 BRPM | DIASTOLIC BLOOD PRESSURE: 89 MMHG | TEMPERATURE: 97.3 F

## 2021-06-08 DIAGNOSIS — D64.9 ANEMIA, UNSPECIFIED: ICD-10-CM

## 2021-06-08 DIAGNOSIS — Z94.1 HEART TRANSPLANT STATUS: Chronic | ICD-10-CM

## 2021-06-08 DIAGNOSIS — Z98.890 OTHER SPECIFIED POSTPROCEDURAL STATES: Chronic | ICD-10-CM

## 2021-06-08 LAB
ALBUMIN SERPL ELPH-MCNC: 4.3 G/DL
ALP BLD-CCNC: 79 U/L
ALT SERPL-CCNC: 37 U/L
ANION GAP SERPL CALC-SCNC: 10 MMOL/L
AST SERPL-CCNC: 32 U/L
BASOPHILS # BLD AUTO: 0.02 K/UL — SIGNIFICANT CHANGE UP (ref 0–0.2)
BASOPHILS NFR BLD AUTO: 0.3 % — SIGNIFICANT CHANGE UP (ref 0–2)
BILIRUB SERPL-MCNC: 0.8 MG/DL
BUN SERPL-MCNC: 32 MG/DL
CALCIUM SERPL-MCNC: 9 MG/DL
CHLORIDE SERPL-SCNC: 111 MMOL/L
CO2 SERPL-SCNC: 20 MMOL/L
CREAT SERPL-MCNC: 1.67 MG/DL
EOSINOPHIL # BLD AUTO: 0.07 K/UL — SIGNIFICANT CHANGE UP (ref 0–0.5)
EOSINOPHIL NFR BLD AUTO: 1 % — SIGNIFICANT CHANGE UP (ref 0–6)
GLUCOSE SERPL-MCNC: 96 MG/DL
HCT VFR BLD CALC: 36 % — LOW (ref 39–50)
HGB BLD-MCNC: 11.2 G/DL — LOW (ref 13–17)
IMM GRANULOCYTES NFR BLD AUTO: 0.7 % — SIGNIFICANT CHANGE UP (ref 0–1.5)
LDH SERPL-CCNC: 190 U/L
LYMPHOCYTES # BLD AUTO: 1.3 K/UL — SIGNIFICANT CHANGE UP (ref 1–3.3)
LYMPHOCYTES # BLD AUTO: 18.8 % — SIGNIFICANT CHANGE UP (ref 13–44)
MCHC RBC-ENTMCNC: 31.1 G/DL — LOW (ref 32–36)
MCHC RBC-ENTMCNC: 32.1 PG — SIGNIFICANT CHANGE UP (ref 27–34)
MCV RBC AUTO: 103.2 FL — HIGH (ref 80–100)
MONOCYTES # BLD AUTO: 0.81 K/UL — SIGNIFICANT CHANGE UP (ref 0–0.9)
MONOCYTES NFR BLD AUTO: 11.7 % — SIGNIFICANT CHANGE UP (ref 2–14)
NEUTROPHILS # BLD AUTO: 4.65 K/UL — SIGNIFICANT CHANGE UP (ref 1.8–7.4)
NEUTROPHILS NFR BLD AUTO: 67.5 % — SIGNIFICANT CHANGE UP (ref 43–77)
NRBC # BLD: 0 /100 WBCS — SIGNIFICANT CHANGE UP (ref 0–0)
PLATELET # BLD AUTO: 173 K/UL — SIGNIFICANT CHANGE UP (ref 150–400)
POTASSIUM SERPL-SCNC: 4.8 MMOL/L
PROT SERPL-MCNC: 6.8 G/DL
RBC # BLD: 3.49 M/UL — LOW (ref 4.2–5.8)
RBC # FLD: 14.3 % — SIGNIFICANT CHANGE UP (ref 10.3–14.5)
RETICS #: 93.6 K/UL — SIGNIFICANT CHANGE UP (ref 25–125)
RETICS/RBC NFR: 2.7 % — HIGH (ref 0.5–2.5)
SODIUM SERPL-SCNC: 141 MMOL/L
WBC # BLD: 6.9 K/UL — SIGNIFICANT CHANGE UP (ref 3.8–10.5)
WBC # FLD AUTO: 6.9 K/UL — SIGNIFICANT CHANGE UP (ref 3.8–10.5)

## 2021-06-08 PROCEDURE — 99214 OFFICE O/P EST MOD 30 MIN: CPT

## 2021-06-08 RX ORDER — DOCUSATE SODIUM 100 MG/1
100 CAPSULE ORAL 3 TIMES DAILY
Qty: 90 | Refills: 5 | Status: DISCONTINUED | COMMUNITY
Start: 2021-06-03 | End: 2021-06-08

## 2021-06-08 RX ORDER — SODIUM ZIRCONIUM CYCLOSILICATE 5 G/5G
5 POWDER, FOR SUSPENSION ORAL
Refills: 0 | Status: DISCONTINUED | COMMUNITY
Start: 2021-05-13 | End: 2021-06-08

## 2021-06-08 NOTE — HISTORY OF PRESENT ILLNESS
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 Treatment - prednisone/Rituxan x 1\par 2/2018 Cardiac transplant\par Bilateral subclavian thrombosis\par UGI bleed [de-identified] : Feels well. No c/o. No chest pain, SOB, abdominal pain, jaundice, dark urine, melena, BRBPR, fever, night sweats, swollen glands, arthritis, rash, limb pain/swelling. Had J&J COVID vaccine.

## 2021-06-08 NOTE — CONSULT LETTER
[Dear  ___] : Dear  [unfilled], [Courtesy Letter:] : I had the pleasure of seeing your patient, [unfilled], in my office today. [Please see my note below.] : Please see my note below. [Sincerely,] : Sincerely, [FreeTextEntry2] : Dr. Lisa Ac [FreeTextEntry3] : Tao Rosario M.D., FACP\par Professor of Medicine\par [employer]\par Associate Chief, Division of Hematology\par [facility]\par [facility]\par [address] [place] [address]\[place] [address]\par [phone]\par \par \par \par

## 2021-06-08 NOTE — ADDENDUM
[FreeTextEntry1] : I, Andrade Sekou, acted solely as a scribe for Dr. Tao Rosario on 06/08/2021. All medical entries made by the Scribe were at my, Dr. Tao Rosario's, direction and personally dictated by me on 06/08/2021. I have reviewed the chart and agree that the record accurately reflects my personal performance of the history, physical exam, assessment and plan. I have also personally directed, reviewed, and agreed with the chart.

## 2021-06-08 NOTE — ASSESSMENT
[Palliative Care Plan] : not applicable at this time [FreeTextEntry1] : 70 YO M S/P cardiac transplant developed mixed type autoimmune hemolytic anemia while on immunosuppression. Appears to be in remission on low dose prednisone. \par \par Plan:\par Prednisone 5 mg daily\par Tacrolimus/ ASA per Cardiology\par No objection to tapering/discontinuing prednisone and switching to Cellcept if Transplant team wishes\par Folic acid 1 mg daily\par CMP, LDH, retics \par CBC monthly\par To ER prn fever\par RTC 3 months\par

## 2021-06-08 NOTE — PHYSICAL EXAM
[Fully active, able to carry on all pre-disease performance without restriction] : Status 0 - Fully active, able to carry on all pre-disease performance without restriction [Normal] : affect appropriate [de-identified] : RRR. S1S2 normak. Gr 2/6 holosystolic murmur LLSB. No gallops.

## 2021-06-08 NOTE — RESULTS/DATA
[FreeTextEntry1] : 6/8/21\par WBC 6900 Hgb 11.2 Hct 36 Platelets 173,000 Diff normal\par \par 5/19/21\par CMP BUN 29 Creatinine 1.53 eGFR 52\par \par 3/2/21\par Retics 2.5%, Abs Retics 97.1\par

## 2021-06-08 NOTE — REASON FOR VISIT
[Follow-Up Visit] : a follow-up visit for [Blood Count Assessment] : blood count assessment [Formal Caregiver] : formal caregiver

## 2021-06-11 NOTE — PROGRESS NOTE ADULT - PROBLEM SELECTOR PLAN 3
see above Skyrizi Counseling: I discussed with the patient the risks of risankizumab-rzaa including but not limited to immunosuppression, and serious infections.  The patient understands that monitoring is required including a PPD at baseline and must alert us or the primary physician if symptoms of infection or other concerning signs are noted.

## 2021-06-15 ENCOUNTER — NON-APPOINTMENT (OUTPATIENT)
Age: 71
End: 2021-06-15

## 2021-06-15 ENCOUNTER — APPOINTMENT (OUTPATIENT)
Dept: HEART FAILURE | Facility: CLINIC | Age: 71
End: 2021-06-15
Payer: MEDICARE

## 2021-06-15 VITALS
OXYGEN SATURATION: 96 % | DIASTOLIC BLOOD PRESSURE: 81 MMHG | RESPIRATION RATE: 16 BRPM | WEIGHT: 170 LBS | SYSTOLIC BLOOD PRESSURE: 130 MMHG | HEART RATE: 108 BPM | HEIGHT: 67 IN | TEMPERATURE: 98.1 F | BODY MASS INDEX: 26.68 KG/M2

## 2021-06-15 PROCEDURE — 99215 OFFICE O/P EST HI 40 MIN: CPT

## 2021-06-15 PROCEDURE — 93000 ELECTROCARDIOGRAM COMPLETE: CPT

## 2021-06-15 NOTE — PHYSICAL EXAM
[General Appearance - Well Developed] : well developed [General Appearance - Well Nourished] : well nourished [Normal Conjunctiva] : the conjunctiva exhibited no abnormalities [Normal Oral Mucosa] : normal oral mucosa [Normal Oropharynx] : normal oropharynx [Respiration, Rhythm And Depth] : normal respiratory rhythm and effort [Auscultation Breath Sounds / Voice Sounds] : lungs were clear to auscultation bilaterally [Heart Rate And Rhythm] : heart rate and rhythm were normal [Heart Sounds] : normal S1 and S2 [Arterial Pulses Normal] : the arterial pulses were normal [Edema] : no peripheral edema present [Bowel Sounds] : normal bowel sounds [Abdomen Soft] : soft [Abdomen Tenderness] : non-tender [Abnormal Walk] : normal gait [Nail Clubbing] : no clubbing of the fingernails [Cyanosis, Localized] : no localized cyanosis [Skin Color & Pigmentation] : normal skin color and pigmentation [Skin Turgor] : normal skin turgor [] : no rash [Oriented To Time, Place, And Person] : oriented to person, place, and time [Impaired Insight] : insight and judgment were intact [No Anxiety] : not feeling anxious [FreeTextEntry1] : RLE +trace edema, LLE no edema

## 2021-06-15 NOTE — DISCUSSION/SUMMARY
[FreeTextEntry1] : feeling well. walks 3-4 blocks. Some SOBOE. \par no orthopnea. \par case rediscussed at length in transplant rounds. structural heart procedure carries indeterminate risk of LAD laceration with no good operative resolution. concensus of group was to defere intervention in this patient who is mildly symptomatic. \par 3 yrs. out. No CAD.  stress Dec did not show ischmia. \par Bx Dec 2020 was normal. However Nov 2020 Bx showed evidence on IF for antibody deposition without HE or functional evidence for injury. DSA were  normal. \par leukopenic off cellcept. intermediate risk for CMV. \par maintained on prograf target 8-10, pred 5. \par followed by Dr Rosario for hemolytic anemia. In remission. \par Annual testing due: dexa, derm\par 130/81, 108\par EKG: \par Plan:\par Q 6 months echo. Q year angiogram. \par target prograf 6-8. will leave on current dose of pred since can not give cellcept\par CMV PCR with each visit since on pred. \par step wise uptitration of losartan and toprol: losartan 50 QHS, 75 QHS, 100 QHS. then toprol 100, 125, 150, 200. with follow up labs and BP. Will need increase in lokelma. \par See me 3 months. \par Marvin Campbell \par \par

## 2021-06-15 NOTE — HISTORY OF PRESENT ILLNESS
[FreeTextEntry1] : Mr. Baez is a 70 year old man with past medical history of a nonischemic cardiomyopathy and chronic systolic heart failure s/p HM2 LVAD in June 2017 complicated by recurrent GI hemorrhage and possible pump thrombosis who underwent heart transplant on 2/23/18 with a hepatitis C positive donor. His course was complicated by bilateral IJ/subclavian thrombi, a persistent small right sided pleural effusion, as well as acute on chronic renal failure of unclear etiology. In addition, his post-operative course was complicated by graft dysfunction of unclear etiology and persistent C4d positive staining on endomyocardial biopsy with negative DSAs. He developed joshua evidence of graft dysfunction leading to treatment with plasmapheresis, IVIG, and rituximab. Subsequently in June of 2018 he was found to have an pneumonia, that was ultimately diagnosed as Nocardia. This was successfully treated with therapy completed in August 2019.\par \par In the spring, he was admitted with hemolytic anemia of unclear etiology. There were no clear precipitating factors, such as infection. He was treated with prednisone and Rituximab. Given the potential for CNI inhibitors leading to hemolytic anemia, we transitioned him to everolimus and he remained on high dose prednisone. He was subsequently admitted with acute anemia (not hemolytic). He developed severe leukopenia and Klebsiella bacteremia. Following treatment of this, he developed a severe C. diff colitis infection leading to a prolonged recovery. He was weaned to lower dose prednisone and the everolimus was transitioned back to tacrolimus. At the time of discharge he was found to have low levels of CMV viremia and was started back on valganciclovir. \par \par Here today for follow up 3 month visit. He reports feeling well, staying active. Walks 3-4 blocks with a cane, limited by SOB. Reports some "abdominal tightness" and b/l shoulder discomfort, but not worsening.  He was recently seen by Hematology last week and no medication changes were made. Pt reports he has been doing well. He has no abdominal complaints or diarrhea. He gets dyspneic with walking >1 block, but is largely limited by his chronic knee osteoarthritis.   He has no PND, orthopnea, cough, or swelling in the legs.  He has no fevers, chills, sweats, dysuria, hematuria, or headaches.  Appetite good, and sleeping well.  Received J&J covid vaccine in April, 2021.

## 2021-06-17 NOTE — ED PROVIDER NOTE - CPE EDP CARDIAC NORM
This call was made to Rogelio Austin to discuss   Chief Complaint   Patient presents with   • Telephonic Visit   • Follow-up     He is an established patient of mine.  He is in [place] and his identity has been established.   Rogelio understands that we are limiting office visits due to the coronavirus pandemic and he consents to a virtual visit with charges submitted to his insurance.   10 minutes were spent in this encounter.  Without the patient being seen and evaluated in person, there is a risk that the information and/or assessment may be incomplete or inaccurate.    Mr. Austin that he has generally been doing well since our last conversation.  Does believe that the duloxetine has helped out significantly with his symptoms.  Notes that symptoms are under better control than they have been for quite some time and does still admit to some issues with urgency and abdominal discomfort, worse with stress, but these episodes are much more rare, occurring every few weeks instead of daily.  Denies any unintentional weight loss, melena, or hematochezia.  No noted side effects from the duloxetine.  We discussed at this point given his clinical improvement would continue the duloxetine which patient is agreeable to.  Given previous evaluation and medication trials without improvement would not recommend further evaluation at this time, patient states that his symptoms currently unacceptable level, however if new or recurring symptoms would then consider further medication adjustments or evaluation operative causes.  Will plan for follow-up in approximately one year to reassess and refill prescription, sooner with any questions, concerns, or issues.    Harvinder Silva MD  Gastroenterology  3:38 PM, 6/17/2021    
- - -

## 2021-06-29 ENCOUNTER — APPOINTMENT (OUTPATIENT)
Dept: RADIOLOGY | Facility: IMAGING CENTER | Age: 71
End: 2021-06-29
Payer: MEDICARE

## 2021-06-29 ENCOUNTER — OUTPATIENT (OUTPATIENT)
Dept: OUTPATIENT SERVICES | Facility: HOSPITAL | Age: 71
LOS: 1 days | End: 2021-06-29
Payer: MEDICARE

## 2021-06-29 DIAGNOSIS — Z94.1 HEART TRANSPLANT STATUS: ICD-10-CM

## 2021-06-29 DIAGNOSIS — Z94.1 HEART TRANSPLANT STATUS: Chronic | ICD-10-CM

## 2021-06-29 DIAGNOSIS — Z98.890 OTHER SPECIFIED POSTPROCEDURAL STATES: Chronic | ICD-10-CM

## 2021-06-29 PROCEDURE — 77080 DXA BONE DENSITY AXIAL: CPT | Mod: 26

## 2021-06-29 PROCEDURE — 77080 DXA BONE DENSITY AXIAL: CPT

## 2021-07-01 ENCOUNTER — LABORATORY RESULT (OUTPATIENT)
Age: 71
End: 2021-07-01

## 2021-07-01 ENCOUNTER — APPOINTMENT (OUTPATIENT)
Dept: CV DIAGNOSITCS | Facility: HOSPITAL | Age: 71
End: 2021-07-01

## 2021-07-01 ENCOUNTER — OUTPATIENT (OUTPATIENT)
Dept: OUTPATIENT SERVICES | Facility: HOSPITAL | Age: 71
LOS: 1 days | End: 2021-07-01
Payer: MEDICARE

## 2021-07-01 DIAGNOSIS — Z98.890 OTHER SPECIFIED POSTPROCEDURAL STATES: Chronic | ICD-10-CM

## 2021-07-01 DIAGNOSIS — Z94.1 HEART TRANSPLANT STATUS: Chronic | ICD-10-CM

## 2021-07-01 DIAGNOSIS — Z94.1 HEART TRANSPLANT STATUS: ICD-10-CM

## 2021-07-01 LAB — TACROLIMUS SERPL-MCNC: 10.5 NG/ML

## 2021-07-01 PROCEDURE — 93306 TTE W/DOPPLER COMPLETE: CPT

## 2021-07-01 PROCEDURE — 93306 TTE W/DOPPLER COMPLETE: CPT | Mod: 26

## 2021-07-01 RX ORDER — LOSARTAN POTASSIUM 25 MG/1
25 TABLET, FILM COATED ORAL
Qty: 30 | Refills: 5 | Status: DISCONTINUED | COMMUNITY
Start: 2020-11-18 | End: 2021-07-01

## 2021-07-03 NOTE — PROGRESS NOTE ADULT - PROBLEM SELECTOR PLAN 3
Continue spironolactone, furosemide, carvedilol,

## 2021-07-14 ENCOUNTER — LABORATORY RESULT (OUTPATIENT)
Age: 71
End: 2021-07-14

## 2021-07-16 LAB — TACROLIMUS SERPL-MCNC: 10.7 NG/ML

## 2021-07-29 ENCOUNTER — APPOINTMENT (OUTPATIENT)
Dept: DERMATOLOGY | Facility: CLINIC | Age: 71
End: 2021-07-29
Payer: MEDICARE

## 2021-07-29 ENCOUNTER — NON-APPOINTMENT (OUTPATIENT)
Age: 71
End: 2021-07-29

## 2021-07-29 VITALS — BODY MASS INDEX: 25.31 KG/M2 | WEIGHT: 167 LBS | HEIGHT: 68 IN

## 2021-07-29 DIAGNOSIS — L82.1 OTHER SEBORRHEIC KERATOSIS: ICD-10-CM

## 2021-07-29 DIAGNOSIS — L98.8 OTHER SPECIFIED DISORDERS OF THE SKIN AND SUBCUTANEOUS TISSUE: ICD-10-CM

## 2021-07-29 PROCEDURE — 99203 OFFICE O/P NEW LOW 30 MIN: CPT

## 2021-07-30 RX ORDER — SODIUM ZIRCONIUM CYCLOSILICATE 10 G/10G
10 POWDER, FOR SUSPENSION ORAL DAILY
Qty: 33 | Refills: 5 | Status: DISCONTINUED | COMMUNITY
Start: 2021-07-01 | End: 2021-07-30

## 2021-07-30 NOTE — H&P ADULT. - PROBLEM SELECTOR PLAN 3
Paged  about critical sodium. Will continue 3% NaCl at 30 ml/hr. Will hold DDVAP. continue medication regimen with valsartan. monitor vitals. tele monitor   cardiac enzymes x 2  Orthostatic blood pressures  DASH diet with 1500 cc Fluid restriction  Treat heart failure

## 2021-08-08 NOTE — PROGRESS NOTE ADULT - SUBJECTIVE AND OBJECTIVE BOX
[facility] DIVISION OF KIDNEY DISEASES AND HYPERTENSION -- PROGRESS NOTE    Chief complaint: NORMAN in heart transplant patient    24 hour events/subjective: tacrolimus dose reduced         PAST HISTORY  --------------------------------------------------------------------------------  No significant changes to PMH, PSH, FHx, SHx, unless otherwise noted    ALLERGIES & MEDICATIONS  --------------------------------------------------------------------------------  Allergies    No Known Allergies    Intolerances      Standing Inpatient Medications  ALBUTerol/ipratropium for Nebulization 3 milliLiter(s) Nebulizer every 6 hours  aspirin enteric coated 81 milliGRAM(s) Oral daily  atovaquone Suspension 1500 milliGRAM(s) Oral daily  Calcium Citrate + Vit D, 315 mg/200 Unit 2 Tablet(s) 2 Tablet(s) Oral two times a day  diltiazem    milliGRAM(s) Oral daily  docusate sodium 100 milliGRAM(s) Oral three times a day  famotidine    Tablet 20 milliGRAM(s) Oral daily  heparin  Injectable 64687 Unit(s) SubCutaneous <User Schedule>  insulin lispro (HumaLOG) corrective regimen sliding scale   SubCutaneous three times a day before meals  insulin lispro (HumaLOG) corrective regimen sliding scale   SubCutaneous at bedtime  magnesium oxide 800 milliGRAM(s) Oral every 12 hours  multivitamin 1 Tablet(s) Oral daily  mycophenolate mofetil 1000 milliGRAM(s) Oral <User Schedule>  nystatin    Suspension 787143 Unit(s) Oral every 6 hours  pravastatin 20 milliGRAM(s) Oral at bedtime  predniSONE   Tablet 15 milliGRAM(s) Oral <User Schedule>  silver sulfADIAZINE 1% Cream 1 Application(s) Topical two times a day  sodium bicarbonate 650 milliGRAM(s) Oral three times a day  tacrolimus 10 milliGRAM(s) Oral <User Schedule>  valGANciclovir 450 milliGRAM(s) Oral <User Schedule>    PRN Inpatient Medications  acetaminophen   Tablet. 650 milliGRAM(s) Oral every 6 hours PRN      REVIEW OF SYSTEMS  --------------------------------------------------------------------------------  Constitutional: [x ]no  Fever [ ] Chills [x ] no Fatigue [ ] Weight change   HEENT: [ ] Blurred vision [ ] Eye Pain [ ] Headache [ ] Runny nose [ ] Sore Throat   Respiratory: [x ] no Cough [ ] Wheezing [x ] no Shortness of breath  Cardiovascular: [x ]no Chest Pain [ ] Palpitations [ ] LOBATO [ ] PND [ ] Orthopnea  Gastrointestinal: [x ] no Abdominal Pain [ ] Diarrhea [ ] Constipation [ ] Hemorrhoids [ ] Nausea [ ] Vomiting  Genitourinary: [ ] Nocturia [ ] Dysuria [ ] Incontinence  Extremities: [x ]no Swelling [ ] Joint Pain  Neurologic: [ ] Focal deficit [ ] Paresthesias [ ] Syncope  Lymphatic: [ ] Swelling [ ] Lymphadenopathy   Skin: [ ] Rash [ ] Ecchymoses [ ] Wounds [ ] Lesions  Psychiatry: [ ] Depression [ ] Suicidal/Homicidal Ideation [ ] Anxiety [ ] Sleep Disturbances  [x ] 10 point review of systems is otherwise negative except as mentioned above              [ ]Unable to obtain due to   All other systems were reviewed and are negative, except as noted.    VITALS/PHYSICAL EXAM  --------------------------------------------------------------------------------  T(C): 36.6 (04-05-18 @ 11:05), Max: 36.6 (04-04-18 @ 19:54)  HR: 99 (04-05-18 @ 11:05) (95 - 105)  BP: 107/70 (04-05-18 @ 11:05) (98/64 - 116/85)  RR: 18 (04-05-18 @ 11:05) (18 - 19)  SpO2: 100% (04-05-18 @ 11:05) (97% - 100%)  Wt(kg): --        04-04-18 @ 07:01  -  04-05-18 @ 07:00  --------------------------------------------------------  IN: 710 mL / OUT: 825 mL / NET: -115 mL    04-05-18 @ 07:01  -  04-05-18 @ 12:23  --------------------------------------------------------  IN: 360 mL / OUT: 0 mL / NET: 360 mL      Physical Exam:  	Gen: NAD, well-appearing, laying in chair comfortably  	HEENT: on room air  	Pulm: CTA B/L  	CV: normal S1S2; no rub  	Abd: soft                      Back : unable to examine  	: No emily  	LE: no edema  	Skin: Warm      LABS/STUDIES  --------------------------------------------------------------------------------              8.9    12.1  >-----------<  261      [04-05-18 @ 07:42]              28.3     138  |  104  |  40  ----------------------------<  110      [04-05-18 @ 07:42]  5.4   |  20  |  1.93        Ca     8.6     [04-05-18 @ 07:42]      Mg     2.1     [04-04-18 @ 10:38]    TPro  5.5  /  Alb  3.1  /  TBili  0.7  /  DBili  x   /  AST  12  /  ALT  23  /  AlkPhos  68  [04-04-18 @ 10:38]    PT/INR: PT 11.9 , INR 1.09       [04-05-18 @ 07:42]  PTT: 31.2       [04-05-18 @ 07:42]      Creatinine Trend:  SCr 1.93 [04-05 @ 07:42]  SCr 1.98 [04-05 @ 06:08]  SCr 1.90 [04-05 @ 01:02]  SCr 2.01 [04-04 @ 10:38]  SCr 2.14 [04-04 @ 07:31]    Urinalysis - [03-23-18 @ 16:03]      Color Yellow / Appearance Slightly Turbid / SG 1.020 / pH 5.0      Gluc Negative / Ketone Negative  / Bili Negative / Urobili Negative       Blood Small / Protein 30 / Leuk Est Small / Nitrite Negative      RBC 5 / WBC 8 / Hyaline 0 / Gran  / Sq Epi  / Non Sq Epi 4 / Bacteria Few      Iron 22, TIBC 182, %sat 12      [02-13-18 @ 12:44]  Ferritin 79.0      [02-13-18 @ 12:44]  HbA1c 5.3      [03-18-18 @ 11:20]  TSH 1.48      [02-27-18 @ 08:13]  Lipid: chol 62, TG 33, HDL 17, LDL 38      [06-07-17 @ 06:14]      C3 Complement 135      [03-19-18 @ 15:13]  C4 Complement 37      [03-19-18 @ 15:13]  Free Light Chains: kappa 4.36, lambda 5.71, ratio = 0.76      [03-19 @ 15:13]  Immunofixation Serum:   No Monoclonal Band Identified      [03-19-18 @ 15:13]  Cryoglobulin: Negative      [03-19-18 @ 15:13] independent

## 2021-08-10 LAB
ALBUMIN SERPL ELPH-MCNC: 4.1 G/DL
ALP BLD-CCNC: 78 U/L
ALT SERPL-CCNC: 27 U/L
ANION GAP SERPL CALC-SCNC: 12 MMOL/L
AST SERPL-CCNC: 20 U/L
BASOPHILS # BLD AUTO: 0.02 K/UL
BASOPHILS NFR BLD AUTO: 0.3 %
BILIRUB SERPL-MCNC: 0.7 MG/DL
BUN SERPL-MCNC: 37 MG/DL
CALCIUM SERPL-MCNC: 8.8 MG/DL
CHLORIDE SERPL-SCNC: 106 MMOL/L
CO2 SERPL-SCNC: 21 MMOL/L
COVID-19 NUCLEOCAPSID  GAM ANTIBODY INTERPRETATION: NEGATIVE
COVID-19 SPIKE DOMAIN ANTIBODY INTERPRETATION: NEGATIVE
CREAT SERPL-MCNC: 1.9 MG/DL
EOSINOPHIL # BLD AUTO: 0.07 K/UL
EOSINOPHIL NFR BLD AUTO: 0.9 %
GLUCOSE SERPL-MCNC: 89 MG/DL
HCT VFR BLD CALC: 36.9 %
HGB BLD-MCNC: 11.5 G/DL
IMM GRANULOCYTES NFR BLD AUTO: 1.1 %
LYMPHOCYTES # BLD AUTO: 1.87 K/UL
LYMPHOCYTES NFR BLD AUTO: 24.9 %
MAGNESIUM SERPL-MCNC: 2.1 MG/DL
MAN DIFF?: NORMAL
MCHC RBC-ENTMCNC: 31.2 GM/DL
MCHC RBC-ENTMCNC: 31.4 PG
MCV RBC AUTO: 100.8 FL
MONOCYTES # BLD AUTO: 0.85 K/UL
MONOCYTES NFR BLD AUTO: 11.3 %
NEUTROPHILS # BLD AUTO: 4.63 K/UL
NEUTROPHILS NFR BLD AUTO: 61.5 %
PLATELET # BLD AUTO: 171 K/UL
POTASSIUM SERPL-SCNC: 4.8 MMOL/L
PROT SERPL-MCNC: 7 G/DL
RBC # BLD: 3.66 M/UL
RBC # FLD: 13.8 %
SARS-COV-2 AB SERPL IA-ACNC: 0.4 U/ML
SARS-COV-2 AB SERPL QL IA: 0.07 INDEX
SODIUM SERPL-SCNC: 139 MMOL/L
TACROLIMUS SERPL-MCNC: 10.3 NG/ML
WBC # FLD AUTO: 7.52 K/UL

## 2021-08-11 ENCOUNTER — NON-APPOINTMENT (OUTPATIENT)
Age: 71
End: 2021-08-11

## 2021-08-26 ENCOUNTER — LABORATORY RESULT (OUTPATIENT)
Age: 71
End: 2021-08-26

## 2021-08-27 LAB
COVID-19 NUCLEOCAPSID  GAM ANTIBODY INTERPRETATION: NEGATIVE
COVID-19 SPIKE DOMAIN ANTIBODY INTERPRETATION: NEGATIVE
SARS-COV-2 AB SERPL IA-ACNC: 0.4 U/ML
SARS-COV-2 AB SERPL QL IA: 0.08 INDEX
TACROLIMUS SERPL-MCNC: 9.8 NG/ML

## 2021-09-09 ENCOUNTER — OUTPATIENT (OUTPATIENT)
Dept: OUTPATIENT SERVICES | Facility: HOSPITAL | Age: 71
LOS: 1 days | Discharge: HOME | End: 2021-09-09

## 2021-09-09 DIAGNOSIS — Z94.1 HEART TRANSPLANT STATUS: Chronic | ICD-10-CM

## 2021-09-09 DIAGNOSIS — D64.9 ANEMIA, UNSPECIFIED: ICD-10-CM

## 2021-09-09 DIAGNOSIS — Z98.890 OTHER SPECIFIED POSTPROCEDURAL STATES: Chronic | ICD-10-CM

## 2021-09-10 ENCOUNTER — RESULT REVIEW (OUTPATIENT)
Age: 71
End: 2021-09-10

## 2021-09-10 ENCOUNTER — APPOINTMENT (OUTPATIENT)
Dept: HEMATOLOGY ONCOLOGY | Facility: CLINIC | Age: 71
End: 2021-09-10
Payer: MEDICARE

## 2021-09-10 VITALS
HEIGHT: 68 IN | WEIGHT: 168.98 LBS | OXYGEN SATURATION: 97 % | HEART RATE: 91 BPM | RESPIRATION RATE: 18 BRPM | TEMPERATURE: 97.2 F | SYSTOLIC BLOOD PRESSURE: 117 MMHG | BODY MASS INDEX: 25.61 KG/M2 | DIASTOLIC BLOOD PRESSURE: 79 MMHG

## 2021-09-10 LAB
BASOPHILS # BLD AUTO: 0.02 K/UL — SIGNIFICANT CHANGE UP (ref 0–0.2)
BASOPHILS NFR BLD AUTO: 0.3 % — SIGNIFICANT CHANGE UP (ref 0–2)
EOSINOPHIL # BLD AUTO: 0.1 K/UL — SIGNIFICANT CHANGE UP (ref 0–0.5)
EOSINOPHIL NFR BLD AUTO: 1.4 % — SIGNIFICANT CHANGE UP (ref 0–6)
HCT VFR BLD CALC: 37.2 % — LOW (ref 39–50)
HGB BLD-MCNC: 11.5 G/DL — LOW (ref 13–17)
IMM GRANULOCYTES NFR BLD AUTO: 1.2 % — SIGNIFICANT CHANGE UP (ref 0–1.5)
LYMPHOCYTES # BLD AUTO: 1.79 K/UL — SIGNIFICANT CHANGE UP (ref 1–3.3)
LYMPHOCYTES # BLD AUTO: 24.5 % — SIGNIFICANT CHANGE UP (ref 13–44)
MCHC RBC-ENTMCNC: 30.9 G/DL — LOW (ref 32–36)
MCHC RBC-ENTMCNC: 31.1 PG — SIGNIFICANT CHANGE UP (ref 27–34)
MCV RBC AUTO: 100.5 FL — HIGH (ref 80–100)
MONOCYTES # BLD AUTO: 0.87 K/UL — SIGNIFICANT CHANGE UP (ref 0–0.9)
MONOCYTES NFR BLD AUTO: 11.9 % — SIGNIFICANT CHANGE UP (ref 2–14)
NEUTROPHILS # BLD AUTO: 4.45 K/UL — SIGNIFICANT CHANGE UP (ref 1.8–7.4)
NEUTROPHILS NFR BLD AUTO: 60.7 % — SIGNIFICANT CHANGE UP (ref 43–77)
NRBC # BLD: 0 /100 WBCS — SIGNIFICANT CHANGE UP (ref 0–0)
PLATELET # BLD AUTO: 168 K/UL — SIGNIFICANT CHANGE UP (ref 150–400)
RBC # BLD: 3.7 M/UL — LOW (ref 4.2–5.8)
RBC # FLD: 14.4 % — SIGNIFICANT CHANGE UP (ref 10.3–14.5)
RETICS #: 104 K/UL — SIGNIFICANT CHANGE UP (ref 25–125)
RETICS/RBC NFR: 2.8 % — HIGH (ref 0.5–2.5)
WBC # BLD: 7.32 K/UL — SIGNIFICANT CHANGE UP (ref 3.8–10.5)
WBC # FLD AUTO: 7.32 K/UL — SIGNIFICANT CHANGE UP (ref 3.8–10.5)

## 2021-09-10 PROCEDURE — 99214 OFFICE O/P EST MOD 30 MIN: CPT

## 2021-09-10 NOTE — ADDENDUM
[FreeTextEntry1] : I, Arwesley Sapp, acted solely as a scribe for Dr. Tao Rosario on 09/10/2021. All medical entries made by the Scribe were at my, Dr. Tao Rosario's, direction and personally dictated by me on 09/10/2021. I have reviewed the chart and agree that the record accurately reflects my personal performance of the history, physical exam, assessment and plan. I have also personally directed, reviewed, and agreed with the chart.

## 2021-09-10 NOTE — RESULTS/DATA
[FreeTextEntry1] : 9/10/21\par WBC 7320 Hgb 11.5 Hct 37.2 .5 Platelets 168,000 Diff normal\par Retics 2.8%, Abs retics 104 RPI 1.4%\par \par 8/26/21\par CMP Cl 109, CO2 20, BUN 35, Creatinine 1.79, eGFR 37\par \par 6/8/21\par \par

## 2021-09-10 NOTE — ASSESSMENT
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 Appears to be in remission on low dose prednisone. \par \par Plan:\par Prednisone 5 mg daily\par Tacrolimus/ ASA per Cardiology\par No objection to tapering/discontinuing prednisone and switching to Cellcept if Transplant team wishes\par Folic acid 1 mg daily\par CMP, LDH\par CBC monthly\par To ER prn fever\par Flu vaccine \par RTC 3 months\par

## 2021-09-10 NOTE — CONSULT LETTER
[Dear  ___] : Dear  [unfilled], [Courtesy Letter:] : I had the pleasure of seeing your patient, [unfilled], in my office today. [Please see my note below.] : Please see my note below. [Sincerely,] : Sincerely, [FreeTextEntry2] : Dr. Lisa Ac [FreeTextEntry3] : Tao Rosario M.D., FACP\par Professor of Medicine\par [employer]\par Associate Chief, Division of Hematology\par [facility]\par [facility]\par [address] [place] [address]\[place] [address]\par [phone]\par \par \par \par

## 2021-09-10 NOTE — PHYSICAL EXAM
[Fully active, able to carry on all pre-disease performance without restriction] : Status 0 - Fully active, able to carry on all pre-disease performance without restriction [Normal] : affect appropriate [de-identified] : RRR. S1S2 normal. Gr 2/6 holosystolic murmur LLSB. No gallops.

## 2021-09-10 NOTE — HISTORY OF PRESENT ILLNESS
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 Treatment - prednisone/Rituxan x 1\par 2/2018 Cardiac transplant\par Bilateral subclavian thrombosis\par UGI bleed [de-identified] : Feels well. No c/o. No chest pain, SOB, abdominal pain, jaundice, dark urine, melena, BRBPR, fever, night sweats, swollen glands, arthritis, rash, limb pain/swelling.

## 2021-09-11 LAB
ALBUMIN SERPL ELPH-MCNC: 4.2 G/DL
ALP BLD-CCNC: 74 U/L
ALT SERPL-CCNC: 19 U/L
ANION GAP SERPL CALC-SCNC: 11 MMOL/L
AST SERPL-CCNC: 16 U/L
BILIRUB SERPL-MCNC: 0.8 MG/DL
BUN SERPL-MCNC: 38 MG/DL
CALCIUM SERPL-MCNC: 9.3 MG/DL
CHLORIDE SERPL-SCNC: 108 MMOL/L
CO2 SERPL-SCNC: 22 MMOL/L
CREAT SERPL-MCNC: 1.77 MG/DL
GLUCOSE SERPL-MCNC: 87 MG/DL
LDH SERPL-CCNC: 176 U/L
POTASSIUM SERPL-SCNC: 5.7 MMOL/L
PROT SERPL-MCNC: 6.9 G/DL
SODIUM SERPL-SCNC: 141 MMOL/L

## 2021-09-13 ENCOUNTER — RESULT CHARGE (OUTPATIENT)
Age: 71
End: 2021-09-13

## 2021-09-14 ENCOUNTER — APPOINTMENT (OUTPATIENT)
Dept: HEART FAILURE | Facility: CLINIC | Age: 71
End: 2021-09-14
Payer: MEDICARE

## 2021-09-14 ENCOUNTER — NON-APPOINTMENT (OUTPATIENT)
Age: 71
End: 2021-09-14

## 2021-09-14 VITALS
RESPIRATION RATE: 16 BRPM | DIASTOLIC BLOOD PRESSURE: 80 MMHG | HEART RATE: 97 BPM | WEIGHT: 172 LBS | BODY MASS INDEX: 26.07 KG/M2 | TEMPERATURE: 97.9 F | OXYGEN SATURATION: 95 % | HEIGHT: 68 IN | SYSTOLIC BLOOD PRESSURE: 133 MMHG

## 2021-09-14 PROCEDURE — 93000 ELECTROCARDIOGRAM COMPLETE: CPT

## 2021-09-14 PROCEDURE — 99214 OFFICE O/P EST MOD 30 MIN: CPT

## 2021-09-14 RX ORDER — METOPROLOL SUCCINATE 50 MG/1
50 TABLET, EXTENDED RELEASE ORAL
Qty: 30 | Refills: 5 | Status: DISCONTINUED | COMMUNITY
Start: 2021-06-21 | End: 2021-09-14

## 2021-09-14 RX ORDER — TACROLIMUS 0.5 MG/1
0.5 CAPSULE ORAL
Qty: 60 | Refills: 5 | Status: DISCONTINUED | COMMUNITY
Start: 2020-12-02 | End: 2021-09-14

## 2021-09-14 NOTE — DISCUSSION/SUMMARY
[FreeTextEntry1] : 3.5 yrs post transplant \par see last note from June 2021. \par No SOB. feels well. \par meds: toprol 150, losartan 100, prograf 6.5 bid (9.8, 10.3), pred 5, asa, vit D, PPI, prava 20. valtessa biw. \par 133/80, afeb, 97/min. 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 \par recent labs: WBC 6.9, Hb 11.2, Plt 186, K 4.8, BUN 35, Cr 1.7 G 88. Mg 2.1 \par CMV PCR not checked. \par clinically stable. \par TTE in Dec. \par monthly labs including CMV PCR. \par make prograf 6/6 target 6-8. \par See 3 months. \par to see rheumatology: management of gout ? allopurinol vs colchicine- caution patient has history of BM suppression. \par Marvin Campbell

## 2021-09-14 NOTE — HISTORY OF PRESENT ILLNESS
[FreeTextEntry1] : Mr. Baez is a 70 year old man with past medical history of a nonischemic cardiomyopathy and chronic systolic heart failure s/p HM2 LVAD in June 2017 complicated by recurrent GI hemorrhage and possible pump thrombosis who underwent heart transplant on 2/23/18 with a hepatitis C positive donor. His course was complicated by bilateral IJ/subclavian thrombi, a persistent small right sided pleural effusion, as well as acute on chronic renal failure of unclear etiology. In addition, his post-operative course was complicated by graft dysfunction of unclear etiology and persistent C4d positive staining on endomyocardial biopsy with negative DSAs. He developed joshua evidence of graft dysfunction leading to treatment with plasmapheresis, IVIG, and rituximab. Subsequently in June of 2018 he was found to have an pneumonia, that was ultimately diagnosed as Nocardia. This was successfully treated with therapy completed in August 2019.\par \par In the spring, he was admitted with hemolytic anemia of unclear etiology. There were no clear precipitating factors, such as infection. He was treated with prednisone and Rituximab. Given the potential for CNI inhibitors leading to hemolytic anemia, we transitioned him to everolimus and he remained on high dose prednisone. He was subsequently admitted with acute anemia (not hemolytic). He developed severe leukopenia and Klebsiella bacteremia. Following treatment of this, he developed a severe C. diff colitis infection leading to a prolonged recovery. He was weaned to lower dose prednisone and the everolimus was transitioned back to tacrolimus. At the time of discharge he was found to have low levels of CMV viremia and was started back on valganciclovir. \par \par Here today for follow up 3 month visit. He reports feeling well, staying active. Walks 3-4 blocks with a cane, limited b/l knee pain.  Reports sometimes still feels "abdominal tightness" and b/l shoulder discomfort, but not worsening.  He was seen by Hematology last week and no medication changes were made. He has no abdominal complaints or diarrhea.  He has no PND, orthopnea, cough, or swelling in the legs.  He has no fevers, chills, sweats, dysuria, hematuria, or headaches.  Appetite good, and sleeping well.

## 2021-09-14 NOTE — PHYSICAL EXAM
[Well Developed] : well developed [Well Nourished] : well nourished [Normal S1, S2] : normal S1, S2 [Clear Lung Fields] : clear lung fields [Non Tender] : non-tender [Soft] : abdomen soft [No Masses/organomegaly] : no masses/organomegaly [Normal] : no edema, no cyanosis, no clubbing, no varicosities [Auscultation Breath Sounds / Voice Sounds] : lungs were clear to auscultation bilaterally [Arterial Pulses Normal] : the arterial pulses were normal [Edema] : no peripheral edema present [Abdomen Tenderness] : non-tender [] : no hepato-splenomegaly [de-identified] : Regular, but tachycardic. III/VI diastolic murmur.  [de-identified] : uses cane [FreeTextEntry1] : Regular, but tachycardic. III/VI diastolic murmur.

## 2021-09-16 ENCOUNTER — RX RENEWAL (OUTPATIENT)
Age: 71
End: 2021-09-16

## 2021-09-30 NOTE — DIETITIAN INITIAL EVALUATION ADULT. - PROBLEM SELECTOR PLAN 1
Admit to CTU. 2 PRBCs ordered. Monitor H/H, melena. Hold Coumadin. Continue ferrous sulfate, folic acid, ascorbic acid. sensory intact

## 2021-10-05 ENCOUNTER — LABORATORY RESULT (OUTPATIENT)
Age: 71
End: 2021-10-05

## 2021-10-06 LAB — TACROLIMUS SERPL-MCNC: 11.6 NG/ML

## 2021-10-07 LAB — CMV DNA SPEC QL NAA+PROBE: NOT DETECTED IU/ML

## 2021-10-07 NOTE — PHYSICAL THERAPY INITIAL EVALUATION ADULT - MANUAL MUSCLE TESTING RESULTS, REHAB EVAL
N/T on (R) wrist , (R) UE 3/5 ,(R) knee 3/5 , (L) LE /(L) UE 4/5 t/o/grossly assessed due to Rifampin Pregnancy And Lactation Text: This medication is Pregnancy Category C and it isn't know if it is safe during pregnancy. It is also excreted in breast milk and should not be used if you are breast feeding.

## 2021-10-13 LAB
ALBUMIN SERPL ELPH-MCNC: 4.2 G/DL
ALP BLD-CCNC: 78 U/L
ALT SERPL-CCNC: 20 U/L
ANION GAP SERPL CALC-SCNC: 15 MMOL/L
AST SERPL-CCNC: 22 U/L
BILIRUB SERPL-MCNC: 1 MG/DL
BUN SERPL-MCNC: 35 MG/DL
CALCIUM SERPL-MCNC: 8.8 MG/DL
CHLORIDE SERPL-SCNC: 106 MMOL/L
CO2 SERPL-SCNC: 21 MMOL/L
CREAT SERPL-MCNC: 1.77 MG/DL
GLUCOSE SERPL-MCNC: 97 MG/DL
POTASSIUM SERPL-SCNC: 5.1 MMOL/L
PROT SERPL-MCNC: 6.4 G/DL
SODIUM SERPL-SCNC: 142 MMOL/L

## 2021-10-14 ENCOUNTER — RX RENEWAL (OUTPATIENT)
Age: 71
End: 2021-10-14

## 2021-10-28 NOTE — H&P CARDIOLOGY - CARDIOVASCULAR COMMENTS
Headache x 2 days.  Woke with right facial droop and numbness on right side at 6:20am. Recent lower leg surgery
Wound vac present

## 2021-11-10 NOTE — PHYSICAL THERAPY INITIAL EVALUATION ADULT - REFERRING PHYSICIAN, REHAB EVAL
Subjective:       History was provided by the mother. Karlos Cabot is a 6 y.o. male who is brought in by his mother for this well-child visit.   Birth History    Birth     Length: 21\" (53.3 cm)     Weight: 8 lb 7 oz (3.827 kg)     HC 34.9 cm (13.75\")    Apgar     One: 9     Five: 9    Discharge Weight: 8 lb 0.3 oz (3.636 kg)    Delivery Method: Vaginal, Spontaneous    Gestation Age: 37 wks    Feeding: Breast [address] Name: [facility]     Immunization History   Administered Date(s) Administered    DTaP 2013, 2014    DTaP/Hep B/IPV (Pediarix) 2013, 2013    DTaP/IPV (Waynetta Achilles, Kinrix) 2018    Hepatitis A 2014, 2014    Hepatitis B 2013, 2013    Hib, unspecified 2013, 2013, 2013, 2014    Influenza Virus Vaccine 2013, 2013, 2014, 2015    Influenza, Lilibeth Saint, IM, PF (6 mo and older Fluzone, Flulaval, Fluarix, and 3 yrs and older Afluria) 10/31/2016, 10/09/2017, 2018, 10/15/2019, 10/19/2020    MMR 2014    MMRV (ProQuad) 2018    Pneumococcal Conjugate 13-valent (Edyth Denver) 2013, 2013, 2014    Pneumococcal Conjugate 7-valent (Prevnar7) 2013    Polio IPV (IPOL) 2013    Rotavirus Pentavalent (RotaTeq) 2013, 2013, 2013    Varicella (Varivax) 2014     Past Medical History:   Diagnosis Date    Allergic     Clavicle fracture     happen during birth     Patient Active Problem List    Diagnosis Date Noted    Constipation 2018    Eczema 2014     Past Surgical History:   Procedure Laterality Date    CIRCUMCISION       Family History   Problem Relation Age of Onset    Allergies Brother      Social History     Socioeconomic History    Marital status: Single     Spouse name: Not on file    Number of children: Not on file    Years of education: Not on file    Highest education level: Not on file   Occupational History    Not on file   Tobacco Use    Smoking status: Never Smoker    Smokeless tobacco: Never Used   Substance and Sexual Activity    Alcohol use: Never    Drug use: Never    Sexual activity: Never   Other Topics Concern    Not on file   Social History Narrative    Not on file     Social Determinants of Health     Financial Resource Strain:     Difficulty of Paying Living Expenses: Not on file   Food Insecurity:     Worried About Running Out of Food in the Last Year: Not on file    Yesi of Food in the Last Year: Not on file   Transportation Needs:     Lack of Transportation (Medical): Not on file    Lack of Transportation (Non-Medical): Not on file   Physical Activity:     Days of Exercise per Week: Not on file    Minutes of Exercise per Session: Not on file   Stress:     Feeling of Stress : Not on file   Social Connections:     Frequency of Communication with Friends and Family: Not on file    Frequency of Social Gatherings with Friends and Family: Not on file    Attends [employer]ology Services: Not on file    Active Member of [address] [place] [address] or Organizations: Not on file    Attends Club or Organization Meetings: Not on file    Marital Status: Not on file   Intimate Partner Violence:     Fear of Current or Ex-Partner: Not on file    Emotionally Abused: Not on file    Physically Abused: Not on file    Sexually Abused: Not on file   Housing Stability:     Unable to Pay for Housing in the Last Year: Not on file    Number of [place] in the Last Year: Not on file    Unstable Housing in the Last Year: Not on file     Current Outpatient Medications   Medication Sig Dispense Refill    montelukast (SINGULAIR) 4 MG chewable tablet CHEW AND SWALLOW 1 TABLET BY MOUTH ONCE DAILY IN THE EVENING 30 tablet 11    cetirizine (ZYRTEC) 5 MG tablet Take 5 mg by mouth       No current facility-administered medications for this visit.      Allergies   Allergen Reactions    Seasonal Dr Hurtado no organomegaly   :  not examined   Extremities:   wnl   Neuro:  normal without focal findings, mental status, speech normal, alert and oriented x3 and BARAK       Assessment:      Diagnosis Orders   1. Encounter for routine child health examination without abnormal findings  IL EVOKED OTOACOUSTIC EMISSIONS SCREEN AUTO ANALYS   2. Allergic rhinitis due to other allergic trigger, unspecified seasonality  External Referral To Allergy   3. Allergic rhinitis, unspecified seasonality, unspecified trigger  montelukast (SINGULAIR) 4 MG chewable tablet          Plan:      1. Anticipatory guidance: Gave CRS handout on well-child issues at this age. Specific topics reviewed: importance of regular dental care, importance of varied diet, minimize junk food and importance of regular exercise. 2. Screening tests:   a.  Venous lead level: not applicable (CDC/AAP recommends if at risk and never done previously)    b. Hb or HCT (CDC recommends annually through age 11 years for children at risk; AAP recommends once age 6-12 months then once at 13 months-5 years): not indicated    c. Cholesterol screening: not applicable (AAP, AHA, and NCEP but not USPSTF recommend fasting lipid profile for h/o premature cardiovascular disease in a parent or grandparent less than 54years old; AAP but not USPSTF recommends total cholesterol if either parent has a cholesterol greater than 240)    d. Urinalysis dipstick: not applicable (Recommended by AAP at 11years old but not by USPSTF)    3. Immunizations today: none  History of previous adverse reactions to immunizations? no    4. Follow-up visit in 1 year for next well-child visit, or sooner as needed. PV Plan  Discussed Nutrition:  Body mass index is 19.71 kg/m². Normal.    Weight control planned discussed  Healthy diet and  regular exercise. Discussed regular exercise.  daily  Smoke exposure: none  Asthma history:  No  Diabetes risk:  No    Patient and/or parent given educational materials - see patient instructions  Was a self-tracking handout given in paper form or via [employer]? No: n/a  Continue routine health care follow up. All patient and/or parent questions answered and voiced understanding.      Requested Prescriptions     Signed Prescriptions Disp Refills    montelukast (SINGULAIR) 4 MG chewable tablet 30 tablet 11     Sig: CHEW AND SWALLOW 1 TABLET BY MOUTH ONCE DAILY IN THE EVENING

## 2021-11-16 ENCOUNTER — LABORATORY RESULT (OUTPATIENT)
Age: 71
End: 2021-11-16

## 2021-11-16 ENCOUNTER — OUTPATIENT (OUTPATIENT)
Dept: OUTPATIENT SERVICES | Facility: HOSPITAL | Age: 71
LOS: 1 days | End: 2021-11-16
Payer: MEDICARE

## 2021-11-16 ENCOUNTER — APPOINTMENT (OUTPATIENT)
Dept: HEART FAILURE | Facility: CLINIC | Age: 71
End: 2021-11-16
Payer: MEDICARE

## 2021-11-16 ENCOUNTER — NON-APPOINTMENT (OUTPATIENT)
Age: 71
End: 2021-11-16

## 2021-11-16 ENCOUNTER — APPOINTMENT (OUTPATIENT)
Dept: CV DIAGNOSITCS | Facility: HOSPITAL | Age: 71
End: 2021-11-16

## 2021-11-16 VITALS
TEMPERATURE: 97.7 F | SYSTOLIC BLOOD PRESSURE: 130 MMHG | RESPIRATION RATE: 16 BRPM | WEIGHT: 174 LBS | HEIGHT: 68 IN | BODY MASS INDEX: 26.37 KG/M2 | OXYGEN SATURATION: 96 % | DIASTOLIC BLOOD PRESSURE: 76 MMHG | HEART RATE: 98 BPM

## 2021-11-16 DIAGNOSIS — Z98.890 OTHER SPECIFIED POSTPROCEDURAL STATES: Chronic | ICD-10-CM

## 2021-11-16 DIAGNOSIS — Z94.1 HEART TRANSPLANT STATUS: ICD-10-CM

## 2021-11-16 DIAGNOSIS — Z94.1 HEART TRANSPLANT STATUS: Chronic | ICD-10-CM

## 2021-11-16 LAB
COVID-19 NUCLEOCAPSID  GAM ANTIBODY INTERPRETATION: NEGATIVE
COVID-19 SPIKE DOMAIN ANTIBODY INTERPRETATION: NEGATIVE
SARS-COV-2 AB SERPL IA-ACNC: 0.4 U/ML
SARS-COV-2 AB SERPL QL IA: 0.07 INDEX
TACROLIMUS SERPL-MCNC: 9.8 NG/ML

## 2021-11-16 PROCEDURE — 99215 OFFICE O/P EST HI 40 MIN: CPT

## 2021-11-16 PROCEDURE — 93306 TTE W/DOPPLER COMPLETE: CPT | Mod: 26

## 2021-11-16 PROCEDURE — 93306 TTE W/DOPPLER COMPLETE: CPT

## 2021-11-16 PROCEDURE — 93000 ELECTROCARDIOGRAM COMPLETE: CPT

## 2021-11-16 NOTE — CARDIOLOGY SUMMARY
[de-identified] : DSE 12/22/2020: Conclusions:\par 1. Normal hemodynamic response.\par 2. Normal electrocardiographic response.\par 3. Overall preserved left ventricular systolic function\par with mild hypokinesis of the mid to distal anterior wall at\par baseline. Normal augmentation in left ventricular systolic\par function with dobutamine infusion.\par 4. No evidence of inducible ischemia on stress\par echocardiogram images.\par *** Compared with echocardiogram of 12/2/2020, overall\par preserved left ventricular systolic function at baseline\par with mild hypokiesis of the mid to distal anterior wall.\par Normal augmentation in left ventricular systolic function\par with dobutamine infusion. 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  No additional coronary-cameral fistulae noted. [de-identified] : 12/2/2020: RHC/Biopsy ISHLT Grade 0R\par 11/18/2020:  L/RHC w/ IVUS:\par CORONARY VESSELS: The coronary circulation is right dominant.\par LM:   --  LM: The vessel was very large sized. Angiography showed\par aneurysmal dilatation.\par LAD:   --  Proximal LAD: The vessel was very large sized. Angiography\par showed aneurysmal dilatation.\par --  Mid LAD: The vessel was very large sized and excessively ectatic. A\par fistula to the left ventricle was identified.\par --  Distal LAD: Normal. 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 No significant intimal thickening/hyperplasia or\par plaque was observed.

## 2021-11-16 NOTE — DISCUSSION/SUMMARY
[FreeTextEntry1] : 3.5 yrs post transplant \par Issues:\par LAD/RV fistula with coronary dilation not amenable to intervention.\par  Intial LV dyfunction initiated on GDMT\par hemolytic anemia, stable followed by Dr Luther. \par intolerant of cellcept due leukopenia. 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 everolimus d/c for that reason. \par No transplant coronary disease. Renal function stable. \par  last Bx in Dec 2020 no cellular rejection. Bx Nov 2020 showed 30% C4D by IF 2+ which had disappeared by the Dec biopsy. \par Feels well no SOB or CP. 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 level pend), pred 5 (hem anemia)), asa, vit D, PPI, prava 20. valtessa QD\par 130/76, 98 174 (172)\par recent labs: WBC 8.6, 12.0, Plt 201, K 4.7, BUN 33, Cr 1.6 (1.8)\par CMV PCR not det 10.5\par TTE today: LVEF normal LVEDD 4.6, basal inf hypo. RVE, normal function. mild TR. no effusion. \par Plan \par PATIENT HAS NOT TOLERATED CELLCEPT IN THE PAST DUE TO LEUKOPENIA. DR LUTHER TO DECIDE IF PRED CAN BE WEANED WITHOUT CELLCEPT ON BOARD \par Target prograf 5-7. \par Podiatry consult. \par Rheum consult. \par Check DSA \par Annual angiogram with RHC Bx with Dr Carpenter in Jan\par Monthly labs (K)  \par Annual DEXA (had osteopenia, on?  lifelong steriod) \par See 3 months. \par Marvin Campbell  16

## 2021-11-17 RX ORDER — TACROLIMUS 1 MG/1
1 CAPSULE ORAL TWICE DAILY
Qty: 60 | Refills: 5 | Status: DISCONTINUED | COMMUNITY
Start: 2020-12-03 | End: 2021-11-17

## 2021-11-17 RX ORDER — PREDNISONE 5 MG/1
5 TABLET ORAL
Qty: 30 | Refills: 5 | Status: DISCONTINUED | COMMUNITY
Start: 2020-05-05 | End: 2021-11-17

## 2021-11-18 LAB — CMV DNA SPEC QL NAA+PROBE: NOT DETECTED IU/ML

## 2021-11-23 NOTE — PROGRESS NOTE ADULT - PROBLEM SELECTOR PLAN 1
[General Appearance - Alert] : alert [General Appearance - In No Acute Distress] : in no acute distress [Sclera] : the sclera and conjunctiva were normal [PERRL With Normal Accommodation] : pupils were equal in size, round, and reactive to light [Extraocular Movements] : extraocular movements were intact [Outer Ear] : the ears and nose were normal in appearance [Oropharynx] : the oropharynx was normal [Neck Appearance] : the appearance of the neck was normal [Neck Cervical Mass (___cm)] : no neck mass was observed [Jugular Venous Distention Increased] : there was no jugular-venous distention [Thyroid Diffuse Enlargement] : the thyroid was not enlarged [Thyroid Nodule] : there were no palpable thyroid nodules [Auscultation Breath Sounds / Voice Sounds] : lungs were clear to auscultation bilaterally [Heart Rate And Rhythm] : heart rate was normal and rhythm regular [Heart Sounds] : normal S1 and S2 [Heart Sounds Gallop] : no gallops [Murmurs] : no murmurs [Heart Sounds Pericardial Friction Rub] : no pericardial rub [Full Pulse] : the pedal pulses are present - Improving.    - Continue oral Vancomycin per Transplant ID [Edema] : there was no peripheral edema [Bowel Sounds] : normal bowel sounds [Abdomen Soft] : soft [Abdomen Tenderness] : non-tender [Abdomen Mass (___ Cm)] : no abdominal mass palpated [No CVA Tenderness] : no ~M costovertebral angle tenderness [No Spinal Tenderness] : no spinal tenderness [Abnormal Walk] : normal gait [Nail Clubbing] : no clubbing  or cyanosis of the fingernails [Musculoskeletal - Swelling] : no joint swelling seen [Motor Tone] : muscle strength and tone were normal [Skin Color & Pigmentation] : normal skin color and pigmentation [Skin Turgor] : normal skin turgor [] : no rash [Normal] : normal [Deep Tendon Reflexes (DTR)] : deep tendon reflexes were 2+ and symmetric [Sensation] : the sensory exam was normal to light touch and pinprick [No Focal Deficits] : no focal deficits [Oriented To Time, Place, And Person] : oriented to person, place, and time [Impaired Insight] : insight and judgment were intact [Affect] : the affect was normal

## 2021-12-01 ENCOUNTER — OUTPATIENT (OUTPATIENT)
Dept: OUTPATIENT SERVICES | Facility: HOSPITAL | Age: 71
LOS: 1 days | End: 2021-12-01
Payer: MEDICARE

## 2021-12-01 ENCOUNTER — APPOINTMENT (OUTPATIENT)
Dept: PODIATRY | Facility: CLINIC | Age: 71
End: 2021-12-01

## 2021-12-01 VITALS
BODY MASS INDEX: 25.31 KG/M2 | SYSTOLIC BLOOD PRESSURE: 111 MMHG | DIASTOLIC BLOOD PRESSURE: 82 MMHG | TEMPERATURE: 97.8 F | RESPIRATION RATE: 18 BRPM | WEIGHT: 167 LBS | HEIGHT: 68 IN | HEART RATE: 90 BPM | OXYGEN SATURATION: 99 %

## 2021-12-01 DIAGNOSIS — B35.1 TINEA UNGUIUM: ICD-10-CM

## 2021-12-01 DIAGNOSIS — Z00.00 ENCOUNTER FOR GENERAL ADULT MEDICAL EXAMINATION WITHOUT ABNORMAL FINDINGS: ICD-10-CM

## 2021-12-01 DIAGNOSIS — M20.12 HALLUX VALGUS (ACQUIRED), LEFT FOOT: ICD-10-CM

## 2021-12-01 DIAGNOSIS — Z94.1 HEART TRANSPLANT STATUS: Chronic | ICD-10-CM

## 2021-12-01 DIAGNOSIS — M20.11 HALLUX VALGUS (ACQUIRED), RIGHT FOOT: ICD-10-CM

## 2021-12-01 DIAGNOSIS — Z98.890 OTHER SPECIFIED POSTPROCEDURAL STATES: Chronic | ICD-10-CM

## 2021-12-01 PROCEDURE — 11721 DEBRIDE NAIL 6 OR MORE: CPT

## 2021-12-01 PROCEDURE — 11719 TRIM NAIL(S) ANY NUMBER: CPT

## 2021-12-01 PROCEDURE — G0463: CPT

## 2021-12-01 NOTE — ASSESSMENT
[FreeTextEntry1] : PE\par Vasc: DP/PT palpable pulses, CFT brisk to all digits, TG wnl, no edema, no erythema noted\par Derm: Dystrophic, elongated toenails b/l, dried and scaly skin, no open lesions noted, no open wounds, no clinical signs of infection\par Neuro: Protective sensation grossly intact b/l\par Ortho: HAV b/l, 2nd and 3rd right digit hammertoes, 2nd left digit hammertoe \par \par A:\par Onychomycosis\par B/l HAV \par \par P:\par Pt seen and evaluated\par Discussed all clinical findings with the pt\par Aseptic trimming of the elongated toenails b/l \par Rx ciclopirox 8% solution\par Instructed pt to apply the solution to toenails daily as instructed \par Patient gave verbal understanding\par RTC 3 months for routine check up \par \par

## 2021-12-01 NOTE — HISTORY OF PRESENT ILLNESS
[FreeTextEntry1] : 70 yo male pt presents to clinic for elongated toenails. Pt isn't aware the last time his nails were cut. Pt states that the nails are too painful upon ambulation. Denies any other pain or pedal complaints. Denies constitutional symptoms of N/V/C/F/Sob\par \par PMH: heart transplant (2018)\par Allergies: none

## 2021-12-06 ENCOUNTER — RX RENEWAL (OUTPATIENT)
Age: 71
End: 2021-12-06

## 2021-12-07 ENCOUNTER — LABORATORY RESULT (OUTPATIENT)
Age: 71
End: 2021-12-07

## 2021-12-07 LAB
COVID-19 NUCLEOCAPSID  GAM ANTIBODY INTERPRETATION: NEGATIVE
COVID-19 SPIKE DOMAIN ANTIBODY INTERPRETATION: NEGATIVE
SARS-COV-2 AB SERPL IA-ACNC: 0.4 U/ML
SARS-COV-2 AB SERPL QL IA: 0.09 INDEX
TACROLIMUS SERPL-MCNC: 9.9 NG/ML

## 2021-12-14 RX ORDER — TACROLIMUS 5 MG/1
5 CAPSULE ORAL
Qty: 60 | Refills: 5 | Status: DISCONTINUED | COMMUNITY
Start: 2020-05-12 | End: 2021-12-14

## 2021-12-15 ENCOUNTER — APPOINTMENT (OUTPATIENT)
Dept: RHEUMATOLOGY | Facility: CLINIC | Age: 71
End: 2021-12-15
Payer: MEDICARE

## 2021-12-15 VITALS
DIASTOLIC BLOOD PRESSURE: 83 MMHG | BODY MASS INDEX: 25.76 KG/M2 | HEART RATE: 95 BPM | TEMPERATURE: 98.1 F | OXYGEN SATURATION: 93 % | HEIGHT: 68 IN | RESPIRATION RATE: 16 BRPM | SYSTOLIC BLOOD PRESSURE: 124 MMHG | WEIGHT: 170 LBS

## 2021-12-15 DIAGNOSIS — M17.10 UNILATERAL PRIMARY OSTEOARTHRITIS, UNSPECIFIED KNEE: ICD-10-CM

## 2021-12-15 PROCEDURE — 99204 OFFICE O/P NEW MOD 45 MIN: CPT

## 2021-12-15 NOTE — ED ADULT NURSE NOTE - OBJECTIVE STATEMENT
66 y/o male presents to the ED via ems, coming from home. A&Ox3. C/O dark stool. Pt. states LVAD placed in June, has defib on L sided chest wall. Pt. states weakness and dizziness for three days. Dark stool present today. +Easy work of breathing, Clear lung sounds. Unable to obtain pulse or BP. Abdomen soft, nondistended, and nontender. Full range of motion of all extremities. Oral mucosa pink, moist, and intact. Sensory and motor intact. Pt. denies blurry vision, headache, chills, fever, chest pain, back pain, N/V/D, or numbness and tingling. Brother at bedside. 16

## 2021-12-16 LAB — URATE SERPL-MCNC: 8.2 MG/DL

## 2021-12-16 NOTE — PROGRESS NOTE ADULT - ASSESSMENT
Recommended observation. ASSESSMENT  1. Nonischemic dilated cardiomyopathy s/p HM2 LVAD on 6/12  2. Acute blood loss anemia sec to GI bleeding  3. Hx of VT    PLAN  1. Nonischemic dilated cardiomyopathy s/p HM2 LVAD on 6/12: Continue coreg 3.125mg BID and lasix 20mg PO BID  2. GI bleed:  S/p EGD with APC/clipping x 2. Hg Improved from 8.4 to 9.3  Ok to restart AC and ASA 81mg daily. Keep Hgb > 8.0    3. History of  VT: c/w Amiodarone 200 mg daily, Coreg 3.125 mg bid and PO Lasix 20 mg daily.

## 2021-12-19 NOTE — PHYSICAL EXAM

## 2021-12-19 NOTE — HISTORY OF PRESENT ILLNESS
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 \par Pmh of hemolytic anemia - treated with rituximab and prednisone - in remission\par Graft dysfunction treated with plasmapheresis, IVIG and rituximab, Nocardia\par Subclavian thrombosis - after surgery\par Leukopenia from CellCept\par followed  bacteremia secondary to Klebsielleae and C-diff colitis\par had CMV viremia and started on valganciclovir\par currently patient has multiple deposits of tophi over hands and feet with ongoing pain and swelling. \par \par Denies any fevers, chill, rashes, weight loss,fatigue,  hair loss,dry eyes or mouth, mouth sores, Raynaud's. chest pain or SOB, GI or , numbness/tingling\par Pregnancy losses\par

## 2021-12-19 NOTE — REVIEW OF SYSTEMS
Please have labs/testing performed before next visit.   [As Noted in HPI] : as noted in HPI [Joint Pain] : joint pain [Joint Swelling] : joint swelling [Negative] : Heme/Lymph

## 2021-12-19 NOTE — ASSESSMENT
[FreeTextEntry1] : 71 year-old female with complicated pmh hx including hear transplant, hemolytic anemia\par \par send for labs - check uric acid levels\par send for x-rays of hands/foot and knees\par will discuss with cardiology starting allopurinol X febuxostat\par CKD stage 3 - consider Krystexxa\par \par patient to call in 1 week to discuss results and next steps\par \par \par f/u 6-8 weeks

## 2021-12-20 ENCOUNTER — OUTPATIENT (OUTPATIENT)
Dept: OUTPATIENT SERVICES | Facility: HOSPITAL | Age: 71
LOS: 1 days | Discharge: HOME | End: 2021-12-20

## 2021-12-20 DIAGNOSIS — Z94.1 HEART TRANSPLANT STATUS: Chronic | ICD-10-CM

## 2021-12-20 DIAGNOSIS — Z98.890 OTHER SPECIFIED POSTPROCEDURAL STATES: Chronic | ICD-10-CM

## 2021-12-20 DIAGNOSIS — D64.9 ANEMIA, UNSPECIFIED: ICD-10-CM

## 2021-12-21 ENCOUNTER — RESULT REVIEW (OUTPATIENT)
Age: 71
End: 2021-12-21

## 2021-12-21 ENCOUNTER — APPOINTMENT (OUTPATIENT)
Dept: HEMATOLOGY ONCOLOGY | Facility: CLINIC | Age: 71
End: 2021-12-21
Payer: MEDICARE

## 2021-12-21 VITALS
WEIGHT: 173.06 LBS | DIASTOLIC BLOOD PRESSURE: 89 MMHG | RESPIRATION RATE: 16 BRPM | TEMPERATURE: 97.1 F | OXYGEN SATURATION: 93 % | HEART RATE: 96 BPM | SYSTOLIC BLOOD PRESSURE: 138 MMHG

## 2021-12-21 LAB
BASOPHILS # BLD AUTO: 0.03 K/UL — SIGNIFICANT CHANGE UP (ref 0–0.2)
BASOPHILS NFR BLD AUTO: 0.4 % — SIGNIFICANT CHANGE UP (ref 0–2)
EOSINOPHIL # BLD AUTO: 0.1 K/UL — SIGNIFICANT CHANGE UP (ref 0–0.5)
EOSINOPHIL NFR BLD AUTO: 1.3 % — SIGNIFICANT CHANGE UP (ref 0–6)
HCT VFR BLD CALC: 38.1 % — LOW (ref 39–50)
HGB BLD-MCNC: 12 G/DL — LOW (ref 13–17)
IMM GRANULOCYTES NFR BLD AUTO: 0.6 % — SIGNIFICANT CHANGE UP (ref 0–1.5)
LYMPHOCYTES # BLD AUTO: 1.97 K/UL — SIGNIFICANT CHANGE UP (ref 1–3.3)
LYMPHOCYTES # BLD AUTO: 24.9 % — SIGNIFICANT CHANGE UP (ref 13–44)
MCHC RBC-ENTMCNC: 31.4 PG — SIGNIFICANT CHANGE UP (ref 27–34)
MCHC RBC-ENTMCNC: 31.5 G/DL — LOW (ref 32–36)
MCV RBC AUTO: 99.7 FL — SIGNIFICANT CHANGE UP (ref 80–100)
MONOCYTES # BLD AUTO: 0.96 K/UL — HIGH (ref 0–0.9)
MONOCYTES NFR BLD AUTO: 12.1 % — SIGNIFICANT CHANGE UP (ref 2–14)
NEUTROPHILS # BLD AUTO: 4.8 K/UL — SIGNIFICANT CHANGE UP (ref 1.8–7.4)
NEUTROPHILS NFR BLD AUTO: 60.7 % — SIGNIFICANT CHANGE UP (ref 43–77)
NRBC # BLD: 0 /100 WBCS — SIGNIFICANT CHANGE UP (ref 0–0)
PLATELET # BLD AUTO: 194 K/UL — SIGNIFICANT CHANGE UP (ref 150–400)
RBC # BLD: 3.82 M/UL — LOW (ref 4.2–5.8)
RBC # FLD: 14.8 % — HIGH (ref 10.3–14.5)
RETICS #: 119.2 K/UL — SIGNIFICANT CHANGE UP (ref 25–125)
RETICS/RBC NFR: 3.1 % — HIGH (ref 0.5–2.5)
WBC # BLD: 7.91 K/UL — SIGNIFICANT CHANGE UP (ref 3.8–10.5)
WBC # FLD AUTO: 7.91 K/UL — SIGNIFICANT CHANGE UP (ref 3.8–10.5)

## 2021-12-21 PROCEDURE — 99214 OFFICE O/P EST MOD 30 MIN: CPT

## 2021-12-21 NOTE — PHYSICAL EXAM
[Fully active, able to carry on all pre-disease performance without restriction] : Status 0 - Fully active, able to carry on all pre-disease performance without restriction [Normal] : affect appropriate [de-identified] : RRR. S1S2 normal. Gr 2/6 holosystolic murmur LLSB. No gallops.

## 2021-12-21 NOTE — CONSULT LETTER
[Dear  ___] : Dear  [unfilled], [Courtesy Letter:] : I had the pleasure of seeing your patient, [unfilled], in my office today. [Please see my note below.] : Please see my note below. [Sincerely,] : Sincerely, [FreeTextEntry2] : Dr. Lisa Ac [FreeTextEntry3] : Tao Rosario M.D., FACP\par Professor of Medicine\par [employer]\par Associate Chief, Division of Hematology\par [facility]\par [facility]\par [address] [place] [address]\[place] [address]\par [phone]\par \par \par \par

## 2021-12-21 NOTE — HISTORY OF PRESENT ILLNESS
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 Treatment - prednisone/Rituxan x 1\par 2/2018 Cardiac transplant\par Bilateral subclavian thrombosis\par UGI bleed [de-identified] : Feels well. No c/o. No chest pain, SOB, abdominal pain, jaundice, dark urine, melena, BRBPR, fever, night sweats, swollen glands, arthritis, rash, limb pain/swelling. Gained weight. Had J&J COVID and flu vaccine.

## 2021-12-21 NOTE — ASSESSMENT
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 Appears to be in remission with well compensated hemolytic anemia on low dose prednisone. \par \par Plan:\par Prednisone 4 mg daily\par Tacrolimus/ ASA per Cardiology\par No objection to tapering/discontinuing prednisone if Transplant team wishes\par Folic acid 1 mg daily\par CBC monthly\par To ER prn fever\par RTC 4 months\par

## 2021-12-21 NOTE — REVIEW OF SYSTEMS
[Negative] : Allergic/Immunologic [Recent Change In Weight] : ~T recent weight change [FreeTextEntry2] : gained weight

## 2021-12-21 NOTE — ADDENDUM
[FreeTextEntry1] : I, Andrade Sekou, acted solely as a scribe for Dr. Tao Rosario on 12/21/2021. All medical entries made by the Scribe were at my, Dr. Tao Rosario's, direction and personally dictated by me on 12/21/2021. I have reviewed the chart and agree that the record accurately reflects my personal performance of the history, physical exam, assessment and plan. I have also personally directed, reviewed, and agreed with the chart.

## 2021-12-21 NOTE — RESULTS/DATA
[FreeTextEntry1] : WBC 7910 Hgb 12 Hct 38.1 MCV 99.7 Platelets 194,000 Diff normal\par Retics 3.1%, Abs retics 119.2  RPI 1.55%\par \par 12/7/21\par CMP CO2 20, BUN 41, Creatinine 1.78, eGFR 38\par \par 10/5/21\par

## 2021-12-23 ENCOUNTER — OUTPATIENT (OUTPATIENT)
Dept: OUTPATIENT SERVICES | Facility: HOSPITAL | Age: 71
LOS: 1 days | End: 2021-12-23
Payer: MEDICARE

## 2021-12-23 DIAGNOSIS — Z98.890 OTHER SPECIFIED POSTPROCEDURAL STATES: Chronic | ICD-10-CM

## 2021-12-23 DIAGNOSIS — M1A.9XX1 CHRONIC GOUT, UNSPECIFIED, WITH TOPHUS (TOPHI): ICD-10-CM

## 2021-12-23 DIAGNOSIS — Z94.1 HEART TRANSPLANT STATUS: Chronic | ICD-10-CM

## 2021-12-23 DIAGNOSIS — M17.10 UNILATERAL PRIMARY OSTEOARTHRITIS, UNSPECIFIED KNEE: ICD-10-CM

## 2021-12-23 PROCEDURE — 73564 X-RAY EXAM KNEE 4 OR MORE: CPT

## 2021-12-23 PROCEDURE — 73564 X-RAY EXAM KNEE 4 OR MORE: CPT | Mod: 26,50

## 2021-12-23 PROCEDURE — 73130 X-RAY EXAM OF HAND: CPT

## 2021-12-23 PROCEDURE — 73130 X-RAY EXAM OF HAND: CPT | Mod: 26,50

## 2021-12-23 PROCEDURE — 73620 X-RAY EXAM OF FOOT: CPT | Mod: 26,50

## 2021-12-23 PROCEDURE — 73620 X-RAY EXAM OF FOOT: CPT

## 2022-01-01 NOTE — H&P ADULT. - NS MD HP IMMUNE DT NO YES
[facility] pharmacy did not receive script for inhaler.  She has the nebulizer.      Patricia Gonzales  [phone]      [place] [facility]  [phone]        Thanks!    Marine   no

## 2022-01-02 NOTE — H&P ADULT. - CLICK TO LAUNCH ORM
Problem: Knowledge Deficit - Standard  Goal: Patient and family/care givers will demonstrate understanding of plan of care, disease process/condition, diagnostic tests and medications  Outcome: Progressing     Problem: Pain - Standard  Goal: Alleviation of pain or a reduction in pain to the patient’s comfort goal  Outcome: Progressing     Problem: Respiratory  Goal: Patient will achieve/maintain optimum respiratory ventilation and gas exchange  Outcome: Progressing   The patient is Stable - Low risk of patient condition declining or worsening    Shift Goals  Clinical Goals: monitor WOB and chest pain  Patient Goals: discharge  Family Goals: N/A    Progress made toward(s) clinical / shift goals:  Patient updated on POC    Patient is not progressing towards the following goals:      
The patient is Stable - Low risk of patient condition declining or worsening         Progress made toward(s) clinical / shift goals:    Problem: Knowledge Deficit - Standard  Goal: Patient and family/care givers will demonstrate understanding of plan of care, disease process/condition, diagnostic tests and medications  Outcome: Progressing     Problem: Pain - Standard  Goal: Alleviation of pain or a reduction in pain to the patient’s comfort goal  Outcome: Progressing     Problem: Respiratory  Goal: Patient will achieve/maintain optimum respiratory ventilation and gas exchange  Outcome: Progressing       Patient is not progressing towards the following goals:      
.

## 2022-01-25 ENCOUNTER — LABORATORY RESULT (OUTPATIENT)
Age: 72
End: 2022-01-25

## 2022-01-25 ENCOUNTER — APPOINTMENT (OUTPATIENT)
Dept: CV DIAGNOSITCS | Facility: HOSPITAL | Age: 72
End: 2022-01-25

## 2022-01-25 ENCOUNTER — OUTPATIENT (OUTPATIENT)
Dept: OUTPATIENT SERVICES | Facility: HOSPITAL | Age: 72
LOS: 1 days | End: 2022-01-25
Payer: MEDICARE

## 2022-01-25 DIAGNOSIS — Z94.1 HEART TRANSPLANT STATUS: ICD-10-CM

## 2022-01-25 DIAGNOSIS — Z94.1 HEART TRANSPLANT STATUS: Chronic | ICD-10-CM

## 2022-01-25 DIAGNOSIS — Z98.890 OTHER SPECIFIED POSTPROCEDURAL STATES: Chronic | ICD-10-CM

## 2022-01-25 PROCEDURE — 93306 TTE W/DOPPLER COMPLETE: CPT

## 2022-01-25 PROCEDURE — 93356 MYOCRD STRAIN IMG SPCKL TRCK: CPT

## 2022-01-25 PROCEDURE — 93306 TTE W/DOPPLER COMPLETE: CPT | Mod: 26

## 2022-01-26 LAB
COVID-19 NUCLEOCAPSID  GAM ANTIBODY INTERPRETATION: POSITIVE
COVID-19 SPIKE DOMAIN ANTIBODY INTERPRETATION: POSITIVE
SARS-COV-2 AB SERPL IA-ACNC: 23 U/ML
SARS-COV-2 AB SERPL QL IA: 13.5 INDEX
TACROLIMUS SERPL-MCNC: 8.9 NG/ML

## 2022-01-28 LAB — CMV DNA SPEC QL NAA+PROBE: NOT DETECTED IU/ML

## 2022-02-28 ENCOUNTER — APPOINTMENT (OUTPATIENT)
Dept: HEART FAILURE | Facility: CLINIC | Age: 72
End: 2022-02-28

## 2022-02-28 ENCOUNTER — OUTPATIENT (OUTPATIENT)
Dept: OUTPATIENT SERVICES | Facility: HOSPITAL | Age: 72
LOS: 1 days | End: 2022-02-28
Payer: MEDICARE

## 2022-02-28 ENCOUNTER — RESULT REVIEW (OUTPATIENT)
Age: 72
End: 2022-02-28

## 2022-02-28 VITALS
HEART RATE: 93 BPM | DIASTOLIC BLOOD PRESSURE: 92 MMHG | SYSTOLIC BLOOD PRESSURE: 155 MMHG | WEIGHT: 166.01 LBS | OXYGEN SATURATION: 98 % | RESPIRATION RATE: 16 BRPM | TEMPERATURE: 98 F | HEIGHT: 68 IN

## 2022-02-28 VITALS
DIASTOLIC BLOOD PRESSURE: 94 MMHG | SYSTOLIC BLOOD PRESSURE: 147 MMHG | RESPIRATION RATE: 16 BRPM | HEART RATE: 91 BPM | OXYGEN SATURATION: 95 %

## 2022-02-28 DIAGNOSIS — Z98.890 OTHER SPECIFIED POSTPROCEDURAL STATES: Chronic | ICD-10-CM

## 2022-02-28 DIAGNOSIS — Z94.1 HEART TRANSPLANT STATUS: Chronic | ICD-10-CM

## 2022-02-28 DIAGNOSIS — Z94.1 HEART TRANSPLANT STATUS: ICD-10-CM

## 2022-02-28 LAB
ALBUMIN SERPL ELPH-MCNC: 4.4 G/DL
ALP BLD-CCNC: 84 U/L
ALT SERPL-CCNC: 35 U/L
ANION GAP SERPL CALC-SCNC: 13 MMOL/L
ANION GAP SERPL CALC-SCNC: 14 MMOL/L — SIGNIFICANT CHANGE UP (ref 5–17)
AST SERPL-CCNC: 35 U/L
BASOPHILS # BLD AUTO: 0.02 K/UL
BASOPHILS NFR BLD AUTO: 0.3 %
BILIRUB SERPL-MCNC: 0.6 MG/DL
BUN SERPL-MCNC: 27 MG/DL — HIGH (ref 7–23)
BUN SERPL-MCNC: 32 MG/DL
CALCIUM SERPL-MCNC: 9 MG/DL
CALCIUM SERPL-MCNC: 9.6 MG/DL — SIGNIFICANT CHANGE UP (ref 8.4–10.5)
CHLORIDE SERPL-SCNC: 105 MMOL/L
CHLORIDE SERPL-SCNC: 106 MMOL/L — SIGNIFICANT CHANGE UP (ref 96–108)
CHOLEST SERPL-MCNC: 120 MG/DL
CO2 SERPL-SCNC: 20 MMOL/L — LOW (ref 22–31)
CO2 SERPL-SCNC: 23 MMOL/L
CREAT SERPL-MCNC: 1.18 MG/DL — SIGNIFICANT CHANGE UP (ref 0.5–1.3)
CREAT SERPL-MCNC: 1.44 MG/DL
EGFR: 66 ML/MIN/1.73M2 — SIGNIFICANT CHANGE UP
EOSINOPHIL # BLD AUTO: 0.07 K/UL
EOSINOPHIL NFR BLD AUTO: 0.9 %
GLUCOSE SERPL-MCNC: 77 MG/DL
GLUCOSE SERPL-MCNC: 94 MG/DL — SIGNIFICANT CHANGE UP (ref 70–99)
HCT VFR BLD CALC: 36.5 %
HCT VFR BLD CALC: 37.4 % — LOW (ref 39–50)
HDLC SERPL-MCNC: 40 MG/DL
HGB BLD-MCNC: 10.9 G/DL
HGB BLD-MCNC: 11.2 G/DL — LOW (ref 13–17)
IMM GRANULOCYTES NFR BLD AUTO: 0.5 %
LDLC SERPL CALC-MCNC: 53 MG/DL
LYMPHOCYTES # BLD AUTO: 1.73 K/UL
LYMPHOCYTES NFR BLD AUTO: 22.7 %
MAGNESIUM SERPL-MCNC: 1.7 MG/DL
MAN DIFF?: NORMAL
MCHC RBC-ENTMCNC: 29.9 GM/DL
MCHC RBC-ENTMCNC: 29.9 GM/DL — LOW (ref 32–36)
MCHC RBC-ENTMCNC: 30.7 PG — SIGNIFICANT CHANGE UP (ref 27–34)
MCHC RBC-ENTMCNC: 31.1 PG
MCV RBC AUTO: 102.5 FL — HIGH (ref 80–100)
MCV RBC AUTO: 104.3 FL
MONOCYTES # BLD AUTO: 0.84 K/UL
MONOCYTES NFR BLD AUTO: 11 %
NEUTROPHILS # BLD AUTO: 4.92 K/UL
NEUTROPHILS NFR BLD AUTO: 64.6 %
NONHDLC SERPL-MCNC: 80 MG/DL
NRBC # BLD: 0 /100 WBCS — SIGNIFICANT CHANGE UP (ref 0–0)
PLATELET # BLD AUTO: 236 K/UL
PLATELET # BLD AUTO: 245 K/UL — SIGNIFICANT CHANGE UP (ref 150–400)
POTASSIUM SERPL-MCNC: 4.9 MMOL/L — SIGNIFICANT CHANGE UP (ref 3.5–5.3)
POTASSIUM SERPL-SCNC: 4.3 MMOL/L
POTASSIUM SERPL-SCNC: 4.9 MMOL/L — SIGNIFICANT CHANGE UP (ref 3.5–5.3)
PROT SERPL-MCNC: 6.9 G/DL
RBC # BLD: 3.5 M/UL
RBC # BLD: 3.65 M/UL — LOW (ref 4.2–5.8)
RBC # FLD: 15.8 % — HIGH (ref 10.3–14.5)
RBC # FLD: 16.4 %
SODIUM SERPL-SCNC: 140 MMOL/L — SIGNIFICANT CHANGE UP (ref 135–145)
SODIUM SERPL-SCNC: 141 MMOL/L
TACROLIMUS SERPL-MCNC: 7.3 NG/ML
TRIGL SERPL-MCNC: 134 MG/DL
WBC # BLD: 10.59 K/UL — HIGH (ref 3.8–10.5)
WBC # FLD AUTO: 10.59 K/UL — HIGH (ref 3.8–10.5)
WBC # FLD AUTO: 7.62 K/UL

## 2022-02-28 PROCEDURE — 93505 ENDOMYOCARDIAL BIOPSY: CPT | Mod: 26

## 2022-02-28 PROCEDURE — 88346 IMFLUOR 1ST 1ANTB STAIN PX: CPT | Mod: 26

## 2022-02-28 PROCEDURE — 93306 TTE W/DOPPLER COMPLETE: CPT

## 2022-02-28 PROCEDURE — 93005 ELECTROCARDIOGRAM TRACING: CPT | Mod: XU

## 2022-02-28 PROCEDURE — 80048 BASIC METABOLIC PNL TOTAL CA: CPT

## 2022-02-28 PROCEDURE — 88342 IMHCHEM/IMCYTCHM 1ST ANTB: CPT | Mod: 26

## 2022-02-28 PROCEDURE — C1753: CPT

## 2022-02-28 PROCEDURE — 99153 MOD SED SAME PHYS/QHP EA: CPT

## 2022-02-28 PROCEDURE — 88346 IMFLUOR 1ST 1ANTB STAIN PX: CPT

## 2022-02-28 PROCEDURE — 93010 ELECTROCARDIOGRAM REPORT: CPT

## 2022-02-28 PROCEDURE — C1769: CPT

## 2022-02-28 PROCEDURE — 93505 ENDOMYOCARDIAL BIOPSY: CPT

## 2022-02-28 PROCEDURE — 85027 COMPLETE CBC AUTOMATED: CPT

## 2022-02-28 PROCEDURE — C1894: CPT

## 2022-02-28 PROCEDURE — 99152 MOD SED SAME PHYS/QHP 5/>YRS: CPT

## 2022-02-28 PROCEDURE — 88307 TISSUE EXAM BY PATHOLOGIST: CPT

## 2022-02-28 PROCEDURE — 93306 TTE W/DOPPLER COMPLETE: CPT | Mod: 26

## 2022-02-28 PROCEDURE — 88341 IMHCHEM/IMCYTCHM EA ADD ANTB: CPT

## 2022-02-28 PROCEDURE — C1889: CPT

## 2022-02-28 PROCEDURE — C1887: CPT

## 2022-02-28 PROCEDURE — 88307 TISSUE EXAM BY PATHOLOGIST: CPT | Mod: 26

## 2022-02-28 NOTE — ASU PATIENT PROFILE, ADULT - NSICDXPASTMEDICALHX_GEN_ALL_CORE_FT
PAST MEDICAL HISTORY:  DVT of upper extremity (deep vein thrombosis)     Former smoker     GIB (gastrointestinal bleeding)     H/O autoimmune hemolytic anemia     H/O hemolytic anemia     Hepatitis C virus     HLD (hyperlipidemia)     HTN     Knee pain, right     Non-Ischemic Cardiomyopathy now s/p transplant 2018    SVT (Supraventricular Tachycardia)

## 2022-02-28 NOTE — ASU PATIENT PROFILE, ADULT - CENTRAL VENOUS CATHETER
Dr Herrera pagesoumya. Pt c/o pain 7/10 around umbilical. Pt given dilaudid at 0910 with good relief. Pt requesting more. Current order is q4hrs prn dilaudid 0.2mg ivp.   no

## 2022-02-28 NOTE — ASU DISCHARGE PLAN (ADULT/PEDIATRIC) - CARE PROVIDER_API CALL
Marvin Campbell  Cardiovascular Diseases  [address] [place] [id number]  Phone: [phone]  Fax: [phone]  Established Patient  Follow Up Time:

## 2022-02-28 NOTE — ASU PATIENT PROFILE, ADULT - FALL HARM RISK - PATIENT NEEDS ASSISTANCE
Humidifier, Steam    follow up with the primary care provider if not better in 7-10 days.       
Walking

## 2022-02-28 NOTE — ASU DISCHARGE PLAN (ADULT/PEDIATRIC) - NS MD DC FALL RISK RISK
For information on Fall & Injury Prevention, visit: [email or web address] OR  [email or web address] OR  https://www.cdc.gov/steadi/patient.html

## 2022-02-28 NOTE — H&P CARDIOLOGY - NSICDXPASTMEDICALHX_GEN_ALL_CORE_FT
PT Evaluation     Today's date: 2021  Patient name: Saloni Carolina  : 2004  MRN: [id number]  Referring provider: Bill Shin PT  Dx:   Encounter Diagnosis     ICD-10-CM    1  Sprain of ulnar collateral ligament of left elbow, subsequent encounter  S53 442D    2  Elbow pain, left  [id number] [id number]                   Assessment  Assessment details: Pt presents with diagnosis of UCL strain secondary to traumatic event  Pt with appropriate elbow ROM today, showing improvement since incident  Strength deficits most signficant with pronation/supination and functionally limited in closed chain activity  Did note mild valgus stress laxity today  Educated on light closed chain progressions for pronation/flexion as these arerequired to return to full functio for wrestling  Goals  LTG:  foto to predicted in 6 weeks  Worst pain decreased 75% in 6 weeks  STG  Single limb WBing in 4 weeks without pain  50% wbing on L UE in 2 weeks without pain        Subjective Evaluation    History of Present Illness  Date of onset: 2021  Mechanism of injury: Pt reports that at wrestling she had her elbow extended and pronanted and her opponent was on top, causing a pop in the medial elbow with bruising associated  Pain most significantly localized to medial aspect  Pt reports supinated position is worse than pronation recently  Pain is quick to come and go  Pt denies locking, clicking and popping in the elbow and night pain  Pain has been trending down since incident  It feels better wit the brace which she wears regularly  Pt reports that she would young to return to wrestling primarily  She has states on  with no matches prior  She returns to orthopedics on 21  X-rays of elbow showing no acute osseous abnormalities     Pain  Current pain ratin  At best pain ratin  At worst pain rating: 3  Quality: sharp    Patient Goals  Patient goals for therapy: decreased pain, increased strength and increased motion          Objective     Palpation     Additional Palpation Details  Tenderness at Medial humeroulnar joint line, UCL, radial head lateral olecranon     Active Range of Motion     Left Elbow   Normal active range of motion    Right Elbow   Normal active range of motion    Passive Range of Motion     Additional Passive Range of Motion Details  Similar to AROM    Strength/Myotome Testing     Left Elbow   Flexion: 4+  Extension: 4+  Forearm supination: 4  Forearm pronation: 4    Right Elbow   Flexion: 5  Extension: 5  Forearm supination: 5  Forearm pronation: 5    Additional Strength Details  Closed chain pronation/supination and elbow ext/flex causing apprehension on L compared to R with decreased ability for WBing      Tests     Left Elbow   Positive active floor push-up sign, valgus stress at 0 degrees and valgus stress at 30 degrees  Negative milking maneuver       General Comments:      Elbow Comments   Medial elbow bruising noted      Flowsheet Rows      Most Recent Value   PT/OT G-Codes   Current Score  71   Projected Score  84             Precautions: none      Manuals 2/16                         STM to medial elbow 15                                      Neuro Re-Ed             Quadriped elbow ext/flex 10x10"             Quadriped elbow pron/sup 10x10"                                                                             Ther Ex                                                                                                                     Ther Activity                                       Gait Training                                       Modalities             h-wave 10 PAST MEDICAL HISTORY:  DVT of upper extremity (deep vein thrombosis)     Former smoker     GIB (gastrointestinal bleeding)     H/O autoimmune hemolytic anemia     H/O hemolytic anemia     Hepatitis C virus     HLD (hyperlipidemia)     HTN     Knee pain, right     Non-Ischemic Cardiomyopathy now s/p transplant 2018    SVT (Supraventricular Tachycardia)

## 2022-02-28 NOTE — ASU DISCHARGE PLAN (ADULT/PEDIATRIC) - ASU DC SPECIAL INSTRUCTIONSFT
Wound Care:   the day AFTER your procedure remove bandage GENTLY, and clean using  mild soap and gentle warm, water stream, pat dry. leave OPEN to air. YOU MAY SHOWER   DO NOT apply lotions, creams, ointments, powder, perfumes to your incision site  DO NOT SOAK your site for 1 week ( no baths, no pools, no tubs, etc...)  Check  your groin and /or wrist daily. A small amount of bruising, and soreness are normal    ACTIVITY: for 24 hours   - DO NOT DRIVE  - DO NOT make any important decisions or sign legal documents   - DO NOT operate heavy machineries   - you may resume sexual activity in 48 hours, unless otherwise instructed by your cardiologist     If your procedure was done through the WRIST: for the NEXT 3DAYS:  - avoid pushing, pulling, with that affected wrist   - avoid repeated movement of that hand and wrist ( eg: typing, hammering)  - DO NOT LIFT anything more than 5 lbs     If your procedure was done through the GROIN: for the NEXT 5 DAYS  - Limit climbing stairs, DO NOT soak in bathtub or pool  - no strenuous activities, pushing, pulling, straining  - Do not lift anything 10lbs or heavier     MEDICATION:   take your medications as explained ( see discharge paperwork)   If you received a STENT, you will be taking antiplatelet medications to KEEP YOUR STENT OPEN ( eg: Aspirin, Plavix, Brilinta, Effient, etc).  Take as prescribed DO NOT STOP taking them without consulting with your cardiologist first.     Follow heart healthy diet recommended by your doctor, , if you smoke STOP SMOKING ( may call [phone] for center of tobacco control if you need assistance)     CALL your doctor to make appointment in 2 WEEKS     ***CALL YOUR DOCTOR***  if you experience: fever, chills, body aches, or severe pain, swelling, redness, heat or yellow discharge at incision site  If you experience Bleeding or excruciating pain at the procedural site, swelling ( golf ball size) at your procedural site  If you experience CHEST PAIN  If you experience extremity numbness, tingling, temperature change ( of your procedural site)   If you are unable to reach your doctor, you may contact:   -Cardiology Office at [facility] at [phone] or   - [facility] [phone]  - [facility] [phone]

## 2022-02-28 NOTE — H&P CARDIOLOGY - HISTORY OF PRESENT ILLNESS
71 yr old male with PMH of HTN, HLD, s/p heart transplant from Hep. C donor (treated) 2/23/18 (post op course complicated by graft dysfunction treated by plasmapheresis, IVIG, and rituximab), presents today for LHC, and RHC/biopsy. Patient states he has been feeling well, stays active; denies chest pain, shortness of breath, palpitations today.     Heart failure MD: Marvin Campbell

## 2022-02-28 NOTE — ASU PATIENT PROFILE, ADULT - FALL HARM RISK - RISK INTERVENTIONS
Assistance OOB with selected safe patient handling equipment/Communicate Fall Risk and Risk Factors to all staff, patient, and family/Discuss with provider need for PT consult/Monitor gait and stability/Provide patient with walking aids - walker, cane, crutches/Reinforce activity limits and safety measures with patient and family/Visual Cue: Yellow wristband/Bed in lowest position, wheels locked, appropriate side rails in place/Call bell, personal items and telephone in reach/Instruct patient to call for assistance before getting out of bed or chair/Non-slip footwear when patient is out of bed/[place] to call system/Physically safe environment - no spills, clutter or unnecessary equipment/Purposeful Proactive Rounding/Room/bathroom lighting operational, light cord in reach

## 2022-03-01 ENCOUNTER — NON-APPOINTMENT (OUTPATIENT)
Age: 72
End: 2022-03-01

## 2022-03-01 LAB
CMV DNA SPEC QL NAA+PROBE: NOT DETECTED IU/ML
SURGICAL PATHOLOGY STUDY: SIGNIFICANT CHANGE UP

## 2022-03-02 ENCOUNTER — OUTPATIENT (OUTPATIENT)
Dept: OUTPATIENT SERVICES | Facility: HOSPITAL | Age: 72
LOS: 1 days | End: 2022-03-02
Payer: MEDICARE

## 2022-03-02 ENCOUNTER — APPOINTMENT (OUTPATIENT)
Dept: PODIATRY | Facility: CLINIC | Age: 72
End: 2022-03-02

## 2022-03-02 VITALS
HEIGHT: 68 IN | TEMPERATURE: 97 F | OXYGEN SATURATION: 98 % | DIASTOLIC BLOOD PRESSURE: 83 MMHG | BODY MASS INDEX: 26.22 KG/M2 | WEIGHT: 173 LBS | HEART RATE: 98 BPM | SYSTOLIC BLOOD PRESSURE: 128 MMHG | RESPIRATION RATE: 18 BRPM

## 2022-03-02 DIAGNOSIS — Z94.1 HEART TRANSPLANT STATUS: Chronic | ICD-10-CM

## 2022-03-02 DIAGNOSIS — Z00.00 ENCOUNTER FOR GENERAL ADULT MEDICAL EXAMINATION WITHOUT ABNORMAL FINDINGS: ICD-10-CM

## 2022-03-02 PROCEDURE — G0463: CPT

## 2022-03-02 NOTE — ASSESSMENT
[FreeTextEntry1] : PE\par Vasc: DP/PT palpable pulses, CFT brisk to all digits, TG wnl, no edema, no erythema noted\par Derm: Dystrophic, elongated toenails b/l, dried and scaly skin, no open lesions noted, no open wounds, no clinical signs of infection\par Neuro: Protective sensation grossly intact b/l\par Ortho: HAV b/l, 2nd and 3rd right digit hammertoes, 2nd left digit hammertoe \par \par A:\par Onychomycosis\par B/l HAV \par Hammertoes\par P:\par Pt seen and evaluated\par Discussed all clinical findings with the pt\par Aseptic trimming of the elongated toenails b/l using the sterile nail nipper\par Instructed pt to continue applying ciclopirox to his toenails daily \par Advised pt to keep his feet clean and check them daily \par Advised pt to come back sooner if problems occur \par RTC 3 months for routine check up \par \par

## 2022-03-02 NOTE — HISTORY OF PRESENT ILLNESS
[FreeTextEntry1] : 72 yo male pt presents to clinic for followup topical treatment of onychomycosis.  treating daily, has one more bottle at home.  Concerned for abrasion on left 2nd toe. Painful elongated toenails and wants them trimmed. Pt follows up for routine check up. Pt states that the nails are too painful upon ambulation. Denies any other pain or pedal complaints. Denies constitutional symptoms of N/V/C/F/Sob\par \par PMH: heart transplant (2018)\par

## 2022-03-03 DIAGNOSIS — M20.42 OTHER HAMMER TOE(S) (ACQUIRED), LEFT FOOT: ICD-10-CM

## 2022-03-03 DIAGNOSIS — B35.1 TINEA UNGUIUM: ICD-10-CM

## 2022-03-03 DIAGNOSIS — I73.9 PERIPHERAL VASCULAR DISEASE, UNSPECIFIED: ICD-10-CM

## 2022-03-03 DIAGNOSIS — M20.41 OTHER HAMMER TOE(S) (ACQUIRED), RIGHT FOOT: ICD-10-CM

## 2022-03-25 ENCOUNTER — APPOINTMENT (OUTPATIENT)
Dept: ENDOCRINOLOGY | Facility: CLINIC | Age: 72
End: 2022-03-25
Payer: MEDICARE

## 2022-03-25 VITALS
OXYGEN SATURATION: 97 % | DIASTOLIC BLOOD PRESSURE: 82 MMHG | WEIGHT: 172 LBS | SYSTOLIC BLOOD PRESSURE: 132 MMHG | HEART RATE: 100 BPM | BODY MASS INDEX: 26.15 KG/M2 | TEMPERATURE: 98 F

## 2022-03-25 DIAGNOSIS — M85.80 OTHER SPECIFIED DISORDERS OF BONE DENSITY AND STRUCTURE, UNSPECIFIED SITE: ICD-10-CM

## 2022-03-25 PROCEDURE — 99214 OFFICE O/P EST MOD 30 MIN: CPT

## 2022-03-25 NOTE — HISTORY OF PRESENT ILLNESS
[Calcium (dietary)] : dietary Calcium [Vitamin D (oral)] : Vitamin D orally [Taking Steroids] : a history of taking steroids [FreeTextEntry1] : Patient is a 70 yo man with hx of nonischemic cardiomyopathy and heart failure s/p LVAD and heart transplant (2018) on chronic prednisone, and hx autoimmune hemolytic anemia, here for evaluation of osteopenia\par \par The patient received a heart transplant in February 23, 2018. The patient had left hip surgery years ago from arthritis. Denies any fracture history, calcium disorders, or kidney stones. No family history of osteoporosis. Patient does take chronic prednisone, currently 4mg/day. Patient does not drink milk, rarely eats cheese, no yogurt, no fish. Mostly eats chicken, beef. He does takes calcium and vitamin D. He does not regularly visit the dentist, has not seen in a few years.\par \par BMD May 2019 with femoral neck T score of -2.1, total hip T score of -1.2 and spine T score of -1.1. Repeat BMD 6/2021 demonstrates femoral neck T score of -1.5 (improving osteopenia), total hip T-score of -0.4 (normal), and spine T-score of -0.8 (normal). Images not available.

## 2022-03-25 NOTE — PHYSICAL EXAM
[Alert] : alert [Well Nourished] : well nourished [No Acute Distress] : no acute distress [Normal Sclera/Conjunctiva] : normal sclera/conjunctiva [EOMI] : extra ocular movement intact [No Proptosis] : no proptosis [Normal Outer Ear/Nose] : the ears and nose were normal in appearance [No Neck Mass] : no neck mass was observed [No Respiratory Distress] : no respiratory distress [No Accessory Muscle Use] : no accessory muscle use [Normal Rate and Effort] : normal respiratory rate and effort [Clear to Auscultation] : lungs were clear to auscultation bilaterally [Normal S1, S2] : normal S1 and S2 [Normal Rate] : heart rate was normal [Regular Rhythm] : with a regular rhythm [Not Tender] : non-tender [Not Distended] : not distended [Soft] : abdomen soft [No Rash] : no rash [Oriented x3] : oriented to person, place, and time [Normal Affect] : the affect was normal [Normal Insight/Judgement] : insight and judgment were intact [Normal Mood] : the mood was normal

## 2022-03-25 NOTE — ASSESSMENT
[FreeTextEntry1] : Patient is a 72 yo man with hx of nonischemic cardiomyopathy and heart failure s/p LVAD and heart transplant (2018) on chronic prednisone, and hx autoimmune hemolytic anemia, here for evaluation of osteopenia\par \par Patient is currently taking prednisone 4mg/day. BMD May 2019 with femoral neck T score of -2.1, total hip T score of -1.2 and spine T score of -1.1. Repeat BMD 6/2021 demonstrates femoral neck T score of -1.5 (improving osteopenia), total hip T-score of -0.4 (normal), and spine T-score of -0.8 (normal). Images are not available but I called [facility] to have the images sent for our review.\par \par Solid organ transplantation is associated with increase fracture risk in patients with osteoporosis. Steroid use also increases the risk of osteoporosis and fracture, however patient is not on a high dose. Mr Baez does not have osteoporosis and, in fact, his BMD on the most recent DEXA scan is improving.  Will plan to repeat BMD in 6/2022 (1 year from previous scan). If BMD is decreasing at that point, can consider treatment with bisphosphonate or other therapy.\par \par Return in 3-4 months. repeat BMD next visit

## 2022-03-25 NOTE — REASON FOR VISIT
[Follow - Up] : a follow-up visit [Osteoporosis] : osteoporosis [FreeTextEntry2] : Pt prev seen by Dr Trevizo

## 2022-03-25 NOTE — ASSESSMENT
[FreeTextEntry1] : Patient is a 72 yo man with hx of nonischemic cardiomyopathy and heart failure s/p LVAD and heart transplant (2018) on chronic prednisone, and hx autoimmune hemolytic anemia, here for evaluation of osteopenia\par \par Patient is currently taking prednisone 4mg/day. BMD May 2019 with femoral neck T score of -2.1, total hip T score of -1.2 and spine T score of -1.1. Repeat BMD 6/2021 demonstrates femoral neck T score of -1.5 (improving osteopenia), total hip T-score of -0.4 (normal), and spine T-score of -0.8 (normal). Images are not available but I called [facility] to have the images sent for our review.\par \par Solid organ transplantation is associated with increase fracture risk in patients with osteoporosis. Steroid use also increases the risk of osteoporosis and fracture, however patient is not on a high dose. Mr aBez does not have osteoporosis and, in fact, his BMD on the most recent DEXA scan is improving.  Will plan to repeat BMD in 6/2022 (1 year from previous scan). If BMD is decreasing at that point, can consider treatment with bisphosphonate or other therapy.\par \par Return in 3-4 months. repeat BMD next visit

## 2022-03-25 NOTE — HISTORY OF PRESENT ILLNESS
[Calcium (dietary)] : dietary Calcium [Vitamin D (oral)] : Vitamin D orally [Taking Steroids] : a history of taking steroids [FreeTextEntry1] : Patient is a 72 yo man with hx of nonischemic cardiomyopathy and heart failure s/p LVAD and heart transplant (2018) on chronic prednisone, and hx autoimmune hemolytic anemia, here for evaluation of osteopenia\par \par The patient received a heart transplant in February 23, 2018. The patient had left hip surgery years ago from arthritis. Denies any fracture history, calcium disorders, or kidney stones. No family history of osteoporosis. Patient does take chronic prednisone, currently 4mg/day. Patient does not drink milk, rarely eats cheese, no yogurt, no fish. Mostly eats chicken, beef. He does takes calcium and vitamin D. He does not regularly visit the dentist, has not seen in a few years.\par \par BMD May 2019 with femoral neck T score of -2.1, total hip T score of -1.2 and spine T score of -1.1. Repeat BMD 6/2021 demonstrates femoral neck T score of -1.5 (improving osteopenia), total hip T-score of -0.4 (normal), and spine T-score of -0.8 (normal). Images not available.

## 2022-04-05 ENCOUNTER — APPOINTMENT (OUTPATIENT)
Dept: HEART FAILURE | Facility: CLINIC | Age: 72
End: 2022-04-05
Payer: MEDICARE

## 2022-04-05 ENCOUNTER — NON-APPOINTMENT (OUTPATIENT)
Age: 72
End: 2022-04-05

## 2022-04-05 VITALS
DIASTOLIC BLOOD PRESSURE: 79 MMHG | SYSTOLIC BLOOD PRESSURE: 131 MMHG | WEIGHT: 171 LBS | OXYGEN SATURATION: 97 % | HEIGHT: 68 IN | TEMPERATURE: 97.6 F | HEART RATE: 95 BPM | BODY MASS INDEX: 25.91 KG/M2

## 2022-04-05 PROCEDURE — 99215 OFFICE O/P EST HI 40 MIN: CPT

## 2022-04-05 PROCEDURE — 93000 ELECTROCARDIOGRAM COMPLETE: CPT

## 2022-04-05 RX ORDER — TACROLIMUS 5 MG/1
5 CAPSULE ORAL
Qty: 60 | Refills: 5 | Status: DISCONTINUED | COMMUNITY
Start: 2021-12-14 | End: 2022-04-05

## 2022-04-05 NOTE — CARDIOLOGY SUMMARY
[de-identified] : DSE 12/22/2020: Conclusions:\par 1. Normal hemodynamic response.\par 2. Normal electrocardiographic response.\par 3. Overall preserved left ventricular systolic function\par with mild hypokinesis of the mid to distal anterior wall at\par baseline. Normal augmentation in left ventricular systolic\par function with dobutamine infusion.\par 4. No evidence of inducible ischemia on stress\par echocardiogram images.\par *** Compared with echocardiogram of 12/2/2020, overall\par preserved left ventricular systolic function at baseline\par with mild hypokiesis of the mid to distal anterior wall.\par Normal augmentation in left ventricular systolic function\par with dobutamine infusion. [de-identified] : TTE 2/28/22: EF 65% LVIDD not calculated. normal biV function. 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 minimal TR. no pericardial effusion. 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  No additional coronary-cameral fistulae noted. 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 IVUS performed of the RCA\par which was normal. Normal RHC\par \par 12/2/2020: RHC/Biopsy ISHLT Grade 0R\par 11/18/2020:  L/RHC w/ IVUS:\par CORONARY VESSELS: The coronary circulation is right dominant.\par LM:   --  LM: The vessel was very large sized. Angiography showed\par aneurysmal dilatation.\par LAD:   --  Proximal LAD: The vessel was very large sized. Angiography\par showed aneurysmal dilatation.\par --  Mid LAD: The vessel was very large sized and excessively ectatic. A\par fistula to the left ventricle was identified.\par --  Distal LAD: Normal. 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 No significant intimal thickening/hyperplasia or\par plaque was observed.

## 2022-04-05 NOTE — DISCUSSION/SUMMARY
[FreeTextEntry1] : 4  yrs post transplant \par Issues:\par LAD/RV fistula with coronary dilation not amenable to intervention.\par  Intial LV dyfunction initiated on GDMT\par hemolytic anemia, stable followed by Dr Rosario. \par intolerant of cellcept due leukopenia. 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 everolimus d/c for that reason. \par No transplant coronary disease. Renal function stable 1.4. \par  last Bx in Feb 2022 0R no cellular rejection. C4D 50%. +1 staining. Allosure < 1.2 No histopath features of [facility]. DSA Feb 2022 normal. \par annual R/L: hemos normal. some further dilation in LAD. \par appreciate Dr sparks's input, osteopenia stable due for repeat dexa in june. \par appreciate Dr Rosario's input, will try slow pred wean. of note patient is not no cellcept and has history of [facility] reluctant to complete d/c prednisone\par feeling well. pain in shoulders. \par meds: toprol 200, losartan 100, prograf 5.0 bid ( 7.3, target 6-8.), pred 4 (hem anemia)), off cellcept  asa, vit D, PPI, prava 20. valtessa QD\par 131/79, 95, afeb. weight stale \par recent labs 2.25: WBC 7.6, Hb 10.9, Plt 236. K 4.3, BUN 32, Cr 1.4. \par Last TTE 2.28: normal BIV function. \par target  prograf 5-7. make progaf 4.5 bid \par prednisone 3mg. \par labs 2 weeks. \par See 3 months with TTE and DEXA \par Marvin Campbell

## 2022-04-05 NOTE — HISTORY OF PRESENT ILLNESS
[FreeTextEntry1] : Mr. Baez is a 71 year old man with past medical history of a nonischemic cardiomyopathy and chronic systolic heart failure s/p HM2 LVAD in June 2017 complicated by recurrent GI hemorrhage and possible pump thrombosis who underwent heart transplant on 2/23/18 with a hepatitis C positive donor. His course was complicated by bilateral IJ/subclavian thrombi, a persistent small right sided pleural effusion, as well as acute on chronic renal failure of unclear etiology. In addition, his post-operative course was complicated by graft dysfunction of unclear etiology and persistent C4d positive staining on endomyocardial biopsy with negative DSAs. He developed joshua evidence of graft dysfunction leading to treatment with plasmapheresis, IVIG, and rituximab. Subsequently in June of 2018 he was found to have an pneumonia, that was ultimately diagnosed as Nocardia. This was successfully treated with therapy completed in August 2019.\par \par In the spring 2020, he was admitted with hemolytic anemia of unclear etiology. There were no clear precipitating factors, such as infection. He was treated with prednisone and Rituximab. Given the potential for CNI inhibitors leading to hemolytic anemia, we transitioned him to everolimus and he remained on high dose prednisone. He was subsequently admitted with acute anemia (not hemolytic). He developed severe leukopenia and Klebsiella bacteremia. Following treatment of this, he developed a severe C. diff colitis infection leading to a prolonged recovery. He was weaned to lower dose prednisone and the everolimus was transitioned back to tacrolimus. At the time of discharge he was found to have low levels of CMV viremia and was started back on valganciclovir. \par \par 4th annual R/LHC and biopsy (2/28/2022( showed normal hemodynamics EMBX ISHLT Grade 0R, IF C4D 50% +1 staining, and larger LAD.\par \par Here today for follow up clinic visit. Since last seen he reports doing ok. Walks 5-6 blocks with a cane, limited b/l knee pain.  Still reports sometimes still feels "abdominal tightness" and b/l shoulder discomfort, but not worsening. He was seen by Hematology in December and no medication changes were made. He has no abdominal complaints or diarrhea. Has normal bowel movements.  He has no PND, orthopnea, cough, or swelling in the legs.  He has no fevers, chills, sweats, dysuria, hematuria, or headaches.  Appetite good, and sleeping well. Received covid booster and +Covid PCR in january, he was asymptomatic, no treatment.\par \par Health maintenance:\par DEXA 5/2019, 6/2021: osteopenia, repeat due in 6/2022. Followed by Dr. Clemens\par CT Colon (4/2017) IMPRESSION: No colonic polyp or mass.\par PSA 3.82 ( 11/15/2019)\par HgA1c 5.3% (1/26/22)

## 2022-04-14 ENCOUNTER — OUTPATIENT (OUTPATIENT)
Dept: OUTPATIENT SERVICES | Facility: HOSPITAL | Age: 72
LOS: 1 days | Discharge: HOME | End: 2022-04-14

## 2022-04-14 DIAGNOSIS — Z94.1 HEART TRANSPLANT STATUS: Chronic | ICD-10-CM

## 2022-04-14 DIAGNOSIS — D64.9 ANEMIA, UNSPECIFIED: ICD-10-CM

## 2022-04-19 ENCOUNTER — RESULT REVIEW (OUTPATIENT)
Age: 72
End: 2022-04-19

## 2022-04-19 ENCOUNTER — APPOINTMENT (OUTPATIENT)
Dept: HEMATOLOGY ONCOLOGY | Facility: CLINIC | Age: 72
End: 2022-04-19
Payer: MEDICARE

## 2022-04-19 VITALS
SYSTOLIC BLOOD PRESSURE: 124 MMHG | WEIGHT: 171.96 LBS | BODY MASS INDEX: 26.15 KG/M2 | HEART RATE: 102 BPM | TEMPERATURE: 97.2 F | DIASTOLIC BLOOD PRESSURE: 83 MMHG | RESPIRATION RATE: 16 BRPM | OXYGEN SATURATION: 93 %

## 2022-04-19 LAB
BASOPHILS # BLD AUTO: 0.03 K/UL — SIGNIFICANT CHANGE UP (ref 0–0.2)
BASOPHILS NFR BLD AUTO: 0.3 % — SIGNIFICANT CHANGE UP (ref 0–2)
EOSINOPHIL # BLD AUTO: 0.12 K/UL — SIGNIFICANT CHANGE UP (ref 0–0.5)
EOSINOPHIL NFR BLD AUTO: 1 % — SIGNIFICANT CHANGE UP (ref 0–6)
HCT VFR BLD CALC: 40.3 % — SIGNIFICANT CHANGE UP (ref 39–50)
HGB BLD-MCNC: 12.4 G/DL — LOW (ref 13–17)
IMM GRANULOCYTES NFR BLD AUTO: 0.3 % — SIGNIFICANT CHANGE UP (ref 0–1.5)
LYMPHOCYTES # BLD AUTO: 1.7 K/UL — SIGNIFICANT CHANGE UP (ref 1–3.3)
LYMPHOCYTES # BLD AUTO: 14.6 % — SIGNIFICANT CHANGE UP (ref 13–44)
MCHC RBC-ENTMCNC: 30.4 PG — SIGNIFICANT CHANGE UP (ref 27–34)
MCHC RBC-ENTMCNC: 30.8 G/DL — LOW (ref 32–36)
MCV RBC AUTO: 98.8 FL — SIGNIFICANT CHANGE UP (ref 80–100)
MONOCYTES # BLD AUTO: 1.64 K/UL — HIGH (ref 0–0.9)
MONOCYTES NFR BLD AUTO: 14.1 % — HIGH (ref 2–14)
NEUTROPHILS # BLD AUTO: 8.08 K/UL — HIGH (ref 1.8–7.4)
NEUTROPHILS NFR BLD AUTO: 69.7 % — SIGNIFICANT CHANGE UP (ref 43–77)
NRBC # BLD: 0 /100 WBCS — SIGNIFICANT CHANGE UP (ref 0–0)
PLATELET # BLD AUTO: 180 K/UL — SIGNIFICANT CHANGE UP (ref 150–400)
RBC # BLD: 4.08 M/UL — LOW (ref 4.2–5.8)
RBC # FLD: 15 % — HIGH (ref 10.3–14.5)
RETICS #: 89.4 K/UL — SIGNIFICANT CHANGE UP (ref 25–125)
RETICS/RBC NFR: 2.2 % — SIGNIFICANT CHANGE UP (ref 0.5–2.5)
WBC # BLD: 11.61 K/UL — HIGH (ref 3.8–10.5)
WBC # FLD AUTO: 11.61 K/UL — HIGH (ref 3.8–10.5)

## 2022-04-19 PROCEDURE — 99214 OFFICE O/P EST MOD 30 MIN: CPT

## 2022-04-19 RX ORDER — CICLOPIROX 80 MG/ML
8 SOLUTION TOPICAL
Qty: 1 | Refills: 2 | Status: DISCONTINUED | COMMUNITY
Start: 2021-12-01 | End: 2022-04-19

## 2022-04-19 NOTE — HISTORY OF PRESENT ILLNESS
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 Treatment - prednisone/Rituxan x 1\par 2/2018 Cardiac transplant\par Bilateral subclavian thrombosis\par UGI bleed [de-identified] : Feels well. He complains of chronic left knee pain. No other c/o. No chest pain, SOB, abdominal pain, jaundice, dark urine, melena, BRBPR, fever, night sweats, swollen glands, arthritis, rash, limb pain/swelling. Weight stable. Prednisone was tapered to 3 mg daily.

## 2022-04-19 NOTE — ADDENDUM
[FreeTextEntry1] : I, Andrade Sekou, acted solely as a scribe for Dr. Tao Rosario on 04/19/2022. All medical entries made by the Scribe were at my, Dr. Tao Rosario's, direction and personally dictated by me on 04/19/2022. I have reviewed the chart and agree that the record accurately reflects my personal performance of the history, physical exam, assessment and plan. I have also personally directed, reviewed, and agreed with the chart.

## 2022-04-19 NOTE — PATIENT PROFILE ADULT. - IS PATIENT PREGNANT?
What Is The Reason For Today's Visit?: the risk of recurrence, and the development of new lesions not applicable (Male)

## 2022-04-19 NOTE — RESULTS/DATA
[FreeTextEntry1] : WBC 11,610 Hgb 12.4 Hct 40.3 MCV 98.8 Platelets 180,000 Diff 70P 15L 14M 1Eos ANC 8080\par Retics 2.2%, Abs retics 89.4 \par \par \par

## 2022-04-19 NOTE — CONSULT LETTER
[Dear  ___] : Dear  [unfilled], [Courtesy Letter:] : I had the pleasure of seeing your patient, [unfilled], in my office today. [Sincerely,] : Sincerely, [Please see my note below.] : Please see my note below. [FreeTextEntry2] : Dr. Lisa Ac [FreeTextEntry3] : Tao Rosario M.D., FACP\par Professor of Medicine\par [employer] [facility] [employer]\par Associate Chief, Division of Hematology\par [facility]\par [facility]\par [address] [place] [address]\[place] [address]\par [phone]\par \par \par \par   [DrEduar  ___] : Dr. HAN

## 2022-04-19 NOTE — ASSESSMENT
[Palliative Care Plan] : not applicable at this time [FreeTextEntry1] : 73 YO M S/P cardiac transplant developed mixed type autoimmune hemolytic anemia while on immunosuppression. Appears to be in remission with well compensated hemolytic anemia on low dose prednisone. \par \par Plan:\par Prednisone 3 mg daily\par Tacrolimus/ ASA per Cardiology\par No objection to slowly tapering/discontinuing prednisone if Transplant team wishes\par Will speak to Dr. Campbell regarding COVID vaccination recommendations/Evusheld\par Folic acid 1 mg daily\par D/C Zinc\par CBC monthly\par To ER prn fever\par RTC 4 months\par

## 2022-04-20 NOTE — PROVIDER CONTACT NOTE (OTHER) - DATE AND TIME:
31-Mar-2017 13:40
03-Apr-2017 01:42
06-Apr-2017 19:10
06-Apr-2017 21:30
06-Apr-2017 22:00
07-Apr-2017 00:00
08-Apr-2017 18:03
14-Apr-2017 11:15
28.1

## 2022-04-25 NOTE — DIETITIAN INITIAL EVALUATION ADULT. - CALCULATED FROM (ML/KG)
----- Message from Mari Shelby MD sent at 4/24/2022  5:35 PM CDT -----  Labs stable, continue with life style changes for elevated cholesterol.    1793

## 2022-04-29 ENCOUNTER — LABORATORY RESULT (OUTPATIENT)
Age: 72
End: 2022-04-29

## 2022-04-29 ENCOUNTER — RX RENEWAL (OUTPATIENT)
Age: 72
End: 2022-04-29

## 2022-04-29 ENCOUNTER — NON-APPOINTMENT (OUTPATIENT)
Age: 72
End: 2022-04-29

## 2022-05-04 ENCOUNTER — NON-APPOINTMENT (OUTPATIENT)
Age: 72
End: 2022-05-04

## 2022-05-04 LAB
ALBUMIN SERPL ELPH-MCNC: 4.3 G/DL
ALP BLD-CCNC: 75 U/L
ALT SERPL-CCNC: 22 U/L
ANION GAP SERPL CALC-SCNC: 15 MMOL/L
AST SERPL-CCNC: 24 U/L
BASOPHILS # BLD AUTO: 0.03 K/UL
BASOPHILS NFR BLD AUTO: 0.3 %
BILIRUB SERPL-MCNC: 0.5 MG/DL
BUN SERPL-MCNC: 40 MG/DL
CALCIUM SERPL-MCNC: 9.3 MG/DL
CHLORIDE SERPL-SCNC: 104 MMOL/L
CO2 SERPL-SCNC: 19 MMOL/L
CREAT SERPL-MCNC: 1.63 MG/DL
EGFR: 44 ML/MIN/1.73M2
EOSINOPHIL # BLD AUTO: 0.1 K/UL
EOSINOPHIL NFR BLD AUTO: 1.2 %
GLUCOSE SERPL-MCNC: 76 MG/DL
HCT VFR BLD CALC: 38.5 %
HGB BLD-MCNC: 11.9 G/DL
IMM GRANULOCYTES NFR BLD AUTO: 0.6 %
LYMPHOCYTES # BLD AUTO: 2.14 K/UL
LYMPHOCYTES NFR BLD AUTO: 24.7 %
MAGNESIUM SERPL-MCNC: 1.6 MG/DL
MAN DIFF?: NORMAL
MCHC RBC-ENTMCNC: 30.5 PG
MCHC RBC-ENTMCNC: 30.9 GM/DL
MCV RBC AUTO: 98.7 FL
MONOCYTES # BLD AUTO: 0.83 K/UL
MONOCYTES NFR BLD AUTO: 9.6 %
NEUTROPHILS # BLD AUTO: 5.52 K/UL
NEUTROPHILS NFR BLD AUTO: 63.6 %
PLATELET # BLD AUTO: 217 K/UL
POTASSIUM SERPL-SCNC: 5.3 MMOL/L
PROT SERPL-MCNC: 7 G/DL
RBC # BLD: 3.9 M/UL
RBC # FLD: 14.7 %
SODIUM SERPL-SCNC: 138 MMOL/L
TACROLIMUS SERPL-MCNC: 7.8 NG/ML
WBC # FLD AUTO: 8.67 K/UL

## 2022-05-15 NOTE — DISCHARGE NOTE ADULT - MEDICATION SUMMARY - MEDICATIONS TO CHANGE
I will SWITCH the dose or number of times a day I take the medications listed below when I get home from the hospital:  None Vaccine status unknown

## 2022-05-17 NOTE — DISCHARGE NOTE ADULT - CARE PLAN
No Principal Discharge DX:	Acute on chronic clinical systolic heart failure  Instructions for follow-up, activity and diet:	Follow up with Dr. Hurtado  Secondary Diagnosis:	Severe mitral regurgitation  Secondary Diagnosis:	PAF (paroxysmal atrial fibrillation)  Secondary Diagnosis:	HTN (hypertension) Principal Discharge DX:	Acute on chronic clinical systolic heart failure  Goal:	prevent SOB  Instructions for follow-up, activity and diet:	Follow up with Dr. Hurtado  Secondary Diagnosis:	Severe mitral regurgitation  Secondary Diagnosis:	PAF (paroxysmal atrial fibrillation)  Secondary Diagnosis:	HTN (hypertension) Principal Discharge DX:	Acute on chronic clinical systolic heart failure  Goal:	prevent SOB  Instructions for follow-up, activity and diet:	Continue Torsemide, Spironolactone, current medications.   Monitor your fluid intake, urine output, weight.   Follow-up with Dr. Hurtado  Secondary Diagnosis:	Severe mitral regurgitation  Goal:	Continue to monitor.  Secondary Diagnosis:	PAF (paroxysmal atrial fibrillation)  Goal:	Rate control.  Instructions for follow-up, activity and diet:	Continue Xarelto.  Continue Metoprolol.  Secondary Diagnosis:	HTN (hypertension)  Goal:	Reduce blood pressure.  Instructions for follow-up, activity and diet:	Continue Torsemide, Spironolactone, Metoprolol.   Monitor your blood pressure.

## 2022-05-26 ENCOUNTER — RX RENEWAL (OUTPATIENT)
Age: 72
End: 2022-05-26

## 2022-05-27 VITALS — SYSTOLIC BLOOD PRESSURE: 136 MMHG | DIASTOLIC BLOOD PRESSURE: 92 MMHG | HEART RATE: 91 BPM

## 2022-05-27 VITALS — DIASTOLIC BLOOD PRESSURE: 85 MMHG | HEART RATE: 90 BPM | SYSTOLIC BLOOD PRESSURE: 126 MMHG

## 2022-05-27 VITALS
HEART RATE: 95 BPM | RESPIRATION RATE: 18 BRPM | BODY MASS INDEX: 26.46 KG/M2 | WEIGHT: 174 LBS | TEMPERATURE: 98.06 F | SYSTOLIC BLOOD PRESSURE: 147 MMHG | DIASTOLIC BLOOD PRESSURE: 85 MMHG | OXYGEN SATURATION: 99 %

## 2022-05-27 LAB
ALBUMIN SERPL ELPH-MCNC: 3.9 G/DL
ALBUMIN SERPL ELPH-MCNC: 4.5 G/DL
ALP BLD-CCNC: 81 U/L
ALP BLD-CCNC: 90 U/L
ALT SERPL-CCNC: 26 U/L
ALT SERPL-CCNC: 29 U/L
ANION GAP SERPL CALC-SCNC: 11 MMOL/L
ANION GAP SERPL CALC-SCNC: 15 MMOL/L
AST SERPL-CCNC: 25 U/L
AST SERPL-CCNC: 28 U/L
BASOPHILS # BLD AUTO: 0.02 K/UL
BASOPHILS # BLD AUTO: 0.03 K/UL
BASOPHILS NFR BLD AUTO: 0.2 %
BASOPHILS NFR BLD AUTO: 0.3 %
BILIRUB SERPL-MCNC: 0.7 MG/DL
BILIRUB SERPL-MCNC: 0.9 MG/DL
BUN SERPL-MCNC: 33 MG/DL
BUN SERPL-MCNC: 37 MG/DL
CALCIUM SERPL-MCNC: 9.1 MG/DL
CALCIUM SERPL-MCNC: 9.4 MG/DL
CHLORIDE SERPL-SCNC: 103 MMOL/L
CHLORIDE SERPL-SCNC: 103 MMOL/L
CMV DNA SPEC QL NAA+PROBE: NOT DETECTED IU/ML
CMVPCR LOG: NOT DETECTED LOG10IU/ML
CO2 SERPL-SCNC: 21 MMOL/L
CO2 SERPL-SCNC: 24 MMOL/L
CREAT SERPL-MCNC: 1.53 MG/DL
CREAT SERPL-MCNC: 1.56 MG/DL
EGFR: 47 ML/MIN/1.73M2
EGFR: 48 ML/MIN/1.73M2
EOSINOPHIL # BLD AUTO: 0.07 K/UL
EOSINOPHIL # BLD AUTO: 0.12 K/UL
EOSINOPHIL NFR BLD AUTO: 0.8 %
EOSINOPHIL NFR BLD AUTO: 1.2 %
GLUCOSE SERPL-MCNC: 91 MG/DL
GLUCOSE SERPL-MCNC: 93 MG/DL
HCT VFR BLD CALC: 17.5 %
HCT VFR BLD CALC: 37.9 %
HGB BLD-MCNC: 11.8 G/DL
HGB BLD-MCNC: 5.3 G/DL
IMM GRANULOCYTES NFR BLD AUTO: 0.7 %
IMM GRANULOCYTES NFR BLD AUTO: 0.7 %
LYMPHOCYTES # BLD AUTO: 1.95 K/UL
LYMPHOCYTES # BLD AUTO: 2.19 K/UL
LYMPHOCYTES NFR BLD AUTO: 19.8 %
LYMPHOCYTES NFR BLD AUTO: 24.9 %
MAGNESIUM SERPL-MCNC: 1.7 MG/DL
MAGNESIUM SERPL-MCNC: 1.8 MG/DL
MAN DIFF?: NORMAL
MAN DIFF?: NORMAL
MCHC RBC-ENTMCNC: 29.3 PG
MCHC RBC-ENTMCNC: 29.9 PG
MCHC RBC-ENTMCNC: 30.3 GM/DL
MCHC RBC-ENTMCNC: 31.1 GM/DL
MCV RBC AUTO: 96.2 FL
MCV RBC AUTO: 96.7 FL
MONOCYTES # BLD AUTO: 0.93 K/UL
MONOCYTES # BLD AUTO: 1.13 K/UL
MONOCYTES NFR BLD AUTO: 10.6 %
MONOCYTES NFR BLD AUTO: 11.4 %
NEUTROPHILS # BLD AUTO: 5.5 K/UL
NEUTROPHILS # BLD AUTO: 6.58 K/UL
NEUTROPHILS NFR BLD AUTO: 62.7 %
NEUTROPHILS NFR BLD AUTO: 66.7 %
PLATELET # BLD AUTO: 218 K/UL
PLATELET # BLD AUTO: 291 K/UL
POTASSIUM SERPL-SCNC: 4.5 MMOL/L
POTASSIUM SERPL-SCNC: 4.6 MMOL/L
PROT SERPL-MCNC: 7.1 G/DL
PROT SERPL-MCNC: 7.2 G/DL
RBC # BLD: 1.81 M/UL
RBC # BLD: 3.94 M/UL
RBC # FLD: 15 %
RBC # FLD: 15.5 %
SODIUM SERPL-SCNC: 138 MMOL/L
SODIUM SERPL-SCNC: 139 MMOL/L
TACROLIMUS SERPL-MCNC: 10.1 NG/ML
TACROLIMUS SERPL-MCNC: 9.2 NG/ML
WBC # FLD AUTO: 8.78 K/UL
WBC # FLD AUTO: 9.87 K/UL

## 2022-05-31 NOTE — PATIENT PROFILE ADULT. - --DESCRIBE SURGICAL SITE
Obdulia Christensen (: 1966) is a 64 y.o. female, established patient, here for evaluation of the following chief complaint(s):  Headache (pt states that she has a headache today ), Hoarse (pt states that she's hoarse often ), and Follow-up (Lab/Doppler rev, GI/ CIC med start, LBP)       ASSESSMENT/PLAN:  Below is the assessment and plan developed based on review of pertinent history, physical exam, labs, studies, and medications. 1. Intractable migraine without aura and without status migrainosus  -     ondansetron hcl (ZOFRAN) 4 mg tablet; Take 1 Tablet by mouth every eight (8) hours as needed for Nausea or Vomiting., Normal, Disp-20 Tablet, R-0  -     SUMAtriptan (IMITREX) 100 mg tablet; Take 1 Tablet by mouth daily as needed for Headache or Migraine., Normal, Disp-9 Tablet, R-2  -     naproxen (NAPROSYN) 500 mg tablet; Take 1 tab WITH Imitrex AS NEEDED for migraine headache and With food, Normal, Disp-20 Tablet, R-2  2. Mixed hyperlipidemia  3. Slow transit constipation  4. GERD without esophagitis  5. Acute thoracic back pain, unspecified back pain laterality      Return in about 8 weeks (around 2022) for OV- CIC, Doppler fu, repeat CHOL. Pt asked to complete follow by next visit: follow low fat diet, continue present plan. Given migraine HA abortive med regimen. Advised to call if HA persist or do NOT improve before next visit. SUBJECTIVE/OBJECTIVE:  HPI    Pt presents to f/u lab/doppler, GI/CIC med restart, and LBP. Will have doppler and mammo done in , scheduled. Taking statin ONLY, not dispensed ANY other meds since last visit. Denies side effects from medication. Feels the same, still hoarse and had SEVERE Migraine HA over weekend. Treated with 2 aleves however. No acute complaints otherwise, but had another severe HA last night with emesis. LBP & GERD improved.            Review of Systems  Constitutional: negative for fevers, chills, anorexia and weight loss  Respiratory: negative for cough, hemoptysis, dyspnea, and wheezing  CV:   negative for chest pain, palpitations, and lower extremity edema  GI:   negative for nausea, vomiting, diarrhea, abdominal pain, and melena  Endo:               negative for polyuria,polydipsia,polyphagia, and heat intolerance  Genitourinary: negative for frequency, urgency, dysuria, retention, and hematuria  Integument:  negative for rash, ulcerations, and pruritus  Hematologic:  negative for easy bruising and bleeding  Musculoskel: negative for arthralgias, muscle weakness,and joint pain/swelling  Neurological:  negative for headaches, dizziness, vertigo,and memory/gait problems  Behavl/Psych: negative for feelings of anxiety, depression, suicide, and mood changes    Visit Vitals  /86 (BP 1 Location: Left upper arm, BP Patient Position: Sitting, BP Cuff Size: Large adult)   Pulse 77   Temp 97 °F (36.1 °C) (Temporal)   Resp 17   Ht 5' 4.8\" (1.646 m)   Wt 163 lb (73.9 kg)   SpO2 96%   BMI 27.29 kg/m²       Wt Readings from Last 3 Encounters:   05/31/22 163 lb (73.9 kg)   04/29/22 162 lb (73.5 kg)   03/26/21 163 lb (73.9 kg)         Physical Exam:   General appearance - alert, well appearing, and in no distress. Mental status - A/O x 4,normal mood and affect. Chest - CTA. Symmetric chest rise. No wheezing. No distress. Heart - Normal rate & rhythm. Normal S1 & S2. No MGR. Abdomen- Soft, round. Non-distended, NT. No pulsatile masses or hernias. Ext-  No pedal edema, clubbing, or cyanosis. Skin-Warm and dry. No hyperpigmentation, ulcerations, or suspicious lesions. Neuro - Normal speech, no focal findings or movement disorder. Normal strength, gait, and muscle tone.      Results for orders placed or performed in visit on 97/71/76   METABOLIC PANEL, COMPREHENSIVE   Result Value Ref Range    Glucose 88 65 - 99 mg/dL    BUN 14 6 - 24 mg/dL    Creatinine 0.91 0.57 - 1.00 mg/dL    eGFR 74 >59 mL/min/1.73    BUN/Creatinine ratio 15 9 - 23    Sodium 143 134 - 144 mmol/L    Potassium 4.2 3.5 - 5.2 mmol/L    Chloride 103 96 - 106 mmol/L    CO2 23 20 - 29 mmol/L    Calcium 9.5 8.7 - 10.2 mg/dL    Protein, total 7.8 6.0 - 8.5 g/dL    Albumin 4.7 3.8 - 4.9 g/dL    GLOBULIN, TOTAL 3.1 1.5 - 4.5 g/dL    A-G Ratio 1.5 1.2 - 2.2    Bilirubin, total 0.3 0.0 - 1.2 mg/dL    Alk. phosphatase 76 44 - 121 IU/L    AST (SGOT) 19 0 - 40 IU/L    ALT (SGPT) 14 0 - 32 IU/L   CBC WITH AUTOMATED DIFF   Result Value Ref Range    WBC 5.5 3.4 - 10.8 x10E3/uL    RBC 4.77 3.77 - 5.28 x10E6/uL    HGB 13.4 11.1 - 15.9 g/dL    HCT 41.0 34.0 - 46.6 %    MCV 86 79 - 97 fL    MCH 28.1 26.6 - 33.0 pg    MCHC 32.7 31.5 - 35.7 g/dL    RDW 12.8 11.7 - 15.4 %    PLATELET 829 444 - 098 x10E3/uL    NEUTROPHILS 46 Not Estab. %    Lymphocytes 44 Not Estab. %    MONOCYTES 7 Not Estab. %    EOSINOPHILS 2 Not Estab. %    BASOPHILS 1 Not Estab. %    ABS. NEUTROPHILS 2.5 1.4 - 7.0 x10E3/uL    Abs Lymphocytes 2.4 0.7 - 3.1 x10E3/uL    ABS. MONOCYTES 0.4 0.1 - 0.9 x10E3/uL    ABS. EOSINOPHILS 0.1 0.0 - 0.4 x10E3/uL    ABS. BASOPHILS 0.0 0.0 - 0.2 x10E3/uL    IMMATURE GRANULOCYTES 0 Not Estab. %    ABS. IMM.  GRANS. 0.0 0.0 - 0.1 x10E3/uL   HEMOGLOBIN A1C WITH EAG   Result Value Ref Range    Hemoglobin A1c 5.9 (H) 4.8 - 5.6 %    Estimated average glucose 123 mg/dL   LIPID PANEL   Result Value Ref Range    Cholesterol, total 230 (H) 100 - 199 mg/dL    Triglyceride 107 0 - 149 mg/dL    HDL Cholesterol 42 >39 mg/dL    VLDL, calculated 19 5 - 40 mg/dL    LDL, calculated 169 (H) 0 - 99 mg/dL   HCV RNA BY PCR W/REFL GENOTYPE   Result Value Ref Range    Hepatitis C Quantitation HCV Not Detected IU/mL    HCV log10 CANCELED log10 IU/mL    Test information Comment     HCV Genotype CANCELED    TSH 3RD GENERATION   Result Value Ref Range    TSH 1.110 0.450 - 4.500 uIU/mL   CVD REPORT   Result Value Ref Range    INTERPRETATION Note    AMB POC RAPID STREP A   Result Value Ref Range    VALID INTERNAL CONTROL POC Yes     Group A Strep Ag Positive Negative             An electronic signature was used to authenticate this note.   -- Tom Snell, NP Sternal Vac dressing

## 2022-06-01 ENCOUNTER — APPOINTMENT (OUTPATIENT)
Dept: PODIATRY | Facility: CLINIC | Age: 72
End: 2022-06-01

## 2022-06-01 ENCOUNTER — OUTPATIENT (OUTPATIENT)
Dept: OUTPATIENT SERVICES | Facility: HOSPITAL | Age: 72
LOS: 1 days | End: 2022-06-01
Payer: MEDICARE

## 2022-06-01 VITALS
HEART RATE: 90 BPM | BODY MASS INDEX: 26.37 KG/M2 | HEIGHT: 68 IN | DIASTOLIC BLOOD PRESSURE: 88 MMHG | SYSTOLIC BLOOD PRESSURE: 133 MMHG | OXYGEN SATURATION: 99 % | RESPIRATION RATE: 18 BRPM | WEIGHT: 174 LBS | TEMPERATURE: 97.3 F

## 2022-06-01 DIAGNOSIS — Z00.00 ENCOUNTER FOR GENERAL ADULT MEDICAL EXAMINATION WITHOUT ABNORMAL FINDINGS: ICD-10-CM

## 2022-06-01 DIAGNOSIS — Z94.1 HEART TRANSPLANT STATUS: Chronic | ICD-10-CM

## 2022-06-01 PROCEDURE — G0463: CPT

## 2022-06-01 RX ORDER — CICLOPIROX 80 MG/ML
8 SOLUTION TOPICAL
Qty: 1 | Refills: 0 | Status: COMPLETED | OUTPATIENT
Start: 2022-06-01

## 2022-06-01 NOTE — HISTORY OF PRESENT ILLNESS
[FreeTextEntry1] : 70 yo male pt presents to clinic for followup fungal nails to b/l feet. Pt states he has been applying ciclopriox for past few months and has seen an improvement. Would like medication refilled. Pt endorses painful, thickened toenails that would like to be trimmed. Also states he has painful bunions to bilateral feet R>L. Denies any other pain or pedal complaints. Denies constitutional symptoms of N/V/C/F/Sob\par \par PMH: heart transplant (2018)\par

## 2022-06-01 NOTE — ASSESSMENT
[FreeTextEntry1] : PE\par Vasc: DP/PT palpable pulses, CFT brisk to all digits, TG wnl, no edema, no erythema noted\par Derm: Dystrophic, elongated toenails b/l, dried and scaly skin, no open lesions noted, no open wounds, no clinical signs of infection\par Neuro: Protective sensation grossly intact b/l\par Ortho: severe HAV b/l, 2nd and 3rd right digit hammertoes, 2nd left digit hammertoe \par \par A:\par Onychomycosis\par B/l HAV \par Hammertoes 2nd, 3rd digit R; 2nd L \par \par \par P:\par Pt seen and evaluated\par Discussed all clinical findings with the pt\par Aseptic trimming of the elongated toenails b/l using the sterile nail nipper\par Rx ciclopirox to apply to nails daily, disucssed timeline for results\par Instructed pt to continue applying ciclopirox to his toenails daily \par Advised pt to keep his feet clean and check them daily \par Recommend shoegear with wide toebox\par Advised pt to come back sooner if problems occur \par RTC 3 months for routine check up \par \par

## 2022-06-02 DIAGNOSIS — M20.11 HALLUX VALGUS (ACQUIRED), RIGHT FOOT: ICD-10-CM

## 2022-06-02 DIAGNOSIS — B35.1 TINEA UNGUIUM: ICD-10-CM

## 2022-06-07 NOTE — PROGRESS NOTE ADULT - ATTENDING COMMENTS
No chief complaint on file.      SUBJECTIVE:    This is a 49 year old White female who comes to the clinic today for follow-up of 05/31/2020 to urgent care visit when she was seen for complaint right medial knee pain.    I have reviewed the patient's medications and allergies, past medical, surgical, social and family history, updating these as appropriate.  See Histories section of the electronic medical record for a display of this information.    Current Outpatient Medications   Medication Sig Dispense Refill   • Cholecalciferol (vitamin D) 50 mcg (2,000 units) capsule      • Ascorbic Acid (vitamin C) 1000 MG tablet Take 1,000 mg by mouth daily.     • B Complex Vitamins (VITAMIN B COMPLEX PO)      • losartan (Cozaar) 50 MG tablet Take 1 tablet by mouth daily. 90 tablet 3   • levothyroxine (Synthroid) 175 MCG tablet Take 1 tablet by mouth daily. 90 tablet 3   • acetaminophen (TYLENOL) 500 MG tablet Take 500 mg by mouth every 6 hours as needed for Pain.     • Multiple Vitamins-Minerals (MULTIVITAMIN & MINERAL PO) Take 1 tablet by mouth daily.     • cetirizine (ZYRTEC) 10 MG tablet Take 10 mg by mouth daily.       No current facility-administered medications for this visit.       Past Medical History:   Diagnosis Date   • Thyroid disease 2004 ?       REVIEW OF SYSTEMS:  CONSTITUTIONAL:  Denies fever or chills.  HEENT:  Denies change in visual acuity.  Denies nasal congestion or sore throat.  RESPIRATORY:  Denies cough or shortness of breath.  CARDIOVASCULAR:  Denies chest pain or edema.   GASTROINTESTINAL:  Denies abdominal pain, nausea, vomiting, bloody stools or diarrhea.   GENITOURINARY:  Denies dysuria, flank pain, hematuria.  MUSCULOSKELETAL:  Denies back pain or joint pain.  INTEGUMENT:  Denies rash.   NEUROLOGIC:  Denies headache, focal weakness or sensory changes.  ENDOCRINE:  Denies polyuria or polydipsia.   LYMPHATIC:  Denies swollen glands.   PSYCHIATRIC:  Denies depression or anxiety.      OBJECTIVE:  VITAL SIGNS:  There were no vitals taken for this visit.  GENERAL:  Well developed, well nourished, no acute distress, non-toxic appearance.   HEENT:  Atraumatic, external ears normal.  External auditory canals open, no cerumen impaction.  Tympanic membranes appear normal.  No drainage.  Nose - Nares patent.  Oropharynx moist, no pharyngeal exudates.  Neck- Normal range of motion, no tenderness, supple.  Eyes:  Pupils equal, round, reactive to light.  Conjunctivae normal.  Extraocular motions intact.   RESPIRATORY:  No respiratory distress, normal breath sounds, no rales, no wheezing.   CARDIOVASCULAR:  Normal rate, normal rhythm, no murmurs, no gallops, no rubs.   GASTROINTESTINAL:  Soft, nondistended, normal bowel sounds, nontender, no organomegaly, no mass, no rebound, no guarding.   GENITOURINARY:  No costovertebral angle tenderness.   MUSCULOSKELETAL:  No edema, no tenderness, no deformities.  Back- No tenderness.  SKIN:  Well hydrated, no rash.   LYMPHATICS:  No lymphadenopathy noted.   NEUROLOGICAL:  Alert and oriented x3.  Cranial nerves 2-12 normal.  Normal motor function.  Normal sensory function.  No focal deficits noted.   PSYCHIATRIC:  Speech and behavior appropriate.     LABS:  {AMB LAB RESULTS - MULTIPLE TIME INTERVALS:910365}      ASSESSMENT/PLAN:  No diagnosis found.      No orders of the defined types were placed in this encounter.      No follow-ups on file.   Doing well. No complaints. Tacro level 10 (level decreasing).   - increase valcyte to 450 q12 (renally dosed)  - increase prograf to 9/10  - hyperkalemia persistent issue; pt had been drinking Ensure which contains potassium; kayexalate today; will look into [place]   - possible d/c tomorrow; next biopsy 4/19

## 2022-06-13 NOTE — H&P CARDIOLOGY - BP NONINVASIVE SYSTOLIC (MM HG)
Case Management Assessment & Discharge Planning Note    Patient name Kiran Rounds  Location MICU 04/MICU 76 MRN [id number]  : 1968 Date 2022       Current Admission Date: 6/10/2022  Current Admission Diagnosis:Acute renal failure [facility])   Patient Active Problem List    Diagnosis Date Noted    Acute encephalopathy 2022    NSTEMI (non-ST elevated myocardial infarction) ([facility] ) 2022    [facility]    Hyperkalemia 06/10/2022    IV drug user 06/10/2022    Cocaine abuse ([facility] ) 06/10/2022    Unresponsiveness 06/10/2022    [facility] 06/10/2022    Chest pain 2020    Essential hypertension 2020    PTSD (post-traumatic stress disorder) 2020    History of substance abuse ([facility] ) 2020      LOS (days): 3  Geometric Mean LOS (GMLOS) (days):   Days to GMLOS:     OBJECTIVE:    Risk of Unplanned Readmission Score: 16 23         Current admission status: Inpatient       Preferred Pharmacy:   [facility], [address] [place] [address],[address]  [address]  Phone: [phone] Fax: [phone]    Primary Care Provider: No primary care provider on file  Primary Insurance: [address] [place] [address]  Secondary Insurance:     ASSESSMENT:  Active Health Care Proxies    There are no active Health Care Proxies on file  Readmission Root Cause  30 Day Readmission: No    Patient Information  Admitted from[de-identified] Home  Mental Status: Other (Comment) (recently extuabated; asleep)  During Assessment patient was accompanied by: Not accompanied during assessment  Assessment information provided by[de-identified] Daughter  Primary Caregiver: Self  Support Systems: Son, Daughter  [place] of Residence: [address] [place] [address] do you live in?: [place] 11 entry access options   Select all that apply : [place] (motel-several steps)  Number of steps to enter home :  (Hotel-several steps)  Type of Current Res[facility]: [facility] ([facility]  Living Arrangements: Lives Alone    Activities of Daily Living Prior to Admission  Functional Status: Independent  Completes ADLs independently?: Yes  Ambulates independently?: Yes  Does patient use assisted devices?: No  Does patient currently own DME?: No  Does patient have a history of Outpatient Therapy (PT/OT)?: No  Does patient have a history of HHC?: No  Does patient currently have [facility]?: No         Patient Information Continued  Income Source: Employed  Does patient have a history of substance abuse?: Currently using, Yes  Current substance use preference: Methamphetamines, Cocaine  Historical substance use preference: Marijuana, Heroin         Means of Transportation  Means of Transport to Appts[de-identified] Drives Self        DISCHARGE DETAILS:    Discharge planning discussed with[de-identified] Daughter-Kathi  Freedom of Choice: Yes  Comments - Freedom of Choice: FOC dicussed  CM contacted family/caregiver?: Yes    Contacts  Patient Contacts: Kathi-daughter  Relationship to Patient[de-identified] Family  Contact Method: Phone  Phone Number: [phone]  Reason/Outcome: Continuity of Care, Emergency Contact    Patient/caregiver received discharge checklist   Content reviewed  Patient/caregiver encouraged to participate in discharge plan of care prior to discharge home  CM reviewed d/c planning process including the following: identifying help at home, patient preference for d/c planning needs, Discharge Lounge, [facility] Meds to Bed program, availability of treatment team to discuss questions or concerns patient and/or family may have regarding understanding medications and recognizing signs and symptoms once discharged  CM also encouraged patient to follow up with all recommended appointments after discharge  Patient advised of importance for patient and family to participate in managing patients medical well being  CM contacted daughter Mati Zavaleta and explained role of CM   Per dtr, pt resides in a motel that has several steps to enter; employed and drives self; independent at baseline  Pt has a history of substance abuse and inpatient SA treatment  Dtr discussed that if STR services are needed; [address] is preferred  Per dtr, 1st COVID vaccination dose was received  142

## 2022-06-14 NOTE — H&P CARDIOLOGY - WEIGHT IN KG
Tc to step daughter rj chaparro She was inquiring as to whether or not pt needed Peg tube  He would like one  Per Miss chaparro at [phone] she stated Dr Hargrove had indicated there was not a need for one at this time     Advised her that when Dr Hargrove returns on Monday as he is on vacation    This question will be deferred to  and she will be notified of answer   She acknowledged understanding   
76.7

## 2022-06-17 ENCOUNTER — RX RENEWAL (OUTPATIENT)
Age: 72
End: 2022-06-17

## 2022-06-19 NOTE — PROGRESS NOTE ADULT - PROBLEM SELECTOR PLAN 3
Verbal/written post procedure instructions were given to patient/caregiver./Instructed patient/caregiver to follow-up with primary care physician./Instructed patient/caregiver regarding signs and symptoms of infection./Elevate the injured extremity as instructed./Keep the cast/splint/dressing clean and dry. 3rd digit right hand w/ ?embolic event-discolorization  plastics consult called - Dr. Yaya Cross  will continue to monitor

## 2022-06-23 NOTE — ED ADULT NURSE NOTE - ED COMFORT CARE
6/23/2022         RE: Venkatesh Alvarado  [address] [facility] [address]        Dear Colleague,    Thank you for referring your patient, Venkatesh Alvarado, to the [facility] [place]. Please see a copy of my visit note below.      Telemedicine Visit: The patient's condition can be safely assessed and treated via synchronous audio and visual telemedicine encounter.      Reason for Telemedicine Visit: covid19 precautions     Originating Site (Patient Location): Patient's home    Distant Site (Provider Location): [facility]: palliative care clinic     Consent:  The patient/guardian has verbally consented to: the potential risks and benefits of telemedicine (video visit) versus in person care; bill my insurance or make self-payment for services provided; and responsibility for payment of non-covered services.     Mode of Communication:  Video Conference via LearnBIG at start, then switched to phone     As the provider I attest to compliance with applicable laws and regulations related to telemedicine.    Palliative Care Counseling Services - Initial Assessment    PLEASE NOTE:  THIS IS A MENTAL HEALTH NOTE.  OTHER PROVIDERS VIEWING THIS NOTE SHOULD USE THIS ONLY FOR UNDERSTANDING THE CONTEXT OF THE PATIENT S EXPERIENCE.  TOPICS DESCRIBED IN THIS NOTE SHOULD NOT BE REFERENCED TO THE PATIENT BY MEDICAL PROVIDERS      Venkatesh Alvarado is a 70 year old man with diagnosis of prostate cancer with bony mets, seen today for initial palliative care counseling assessment via Ember video.  HAd to switch to phone part way through due to poor internet connection.    Referred by: He was not sure who had referred him.  Seems he was seen by palliative care team during a recent hospitalization and follow up with palliative was recommended.  He is followed by [facility] and so an appointment was made with the Palliative NP there, but he cancelled the appointment the day of due to not feeling it  "was necessary / not knowing why it had been made.     Presenting Issues: Coping with illness    Preferred Name: Venkatesh    Mental Status Exam: (List all that apply)      Appearance: Appropriate      Eye Contact: Good       Orientation: Yes, x4      Mood: Normal      Affect: Appropriate      Thought Content: Clear         Thought Form: Logical      Psychomotor Behavior: Normal    Family:       Marital status:     Years : not asked    Years together: not asked     Name of spouse/partner: not asked      Children: Son Tim lives about 10 minutes away, helps Venkatesh with meals, transport to appointments.  Venkatesh describes him as very supportive.        Parents: not asked      Siblings: has a brother \"he comes and goes\".  Reports that his brother wanted Venkatesh to move into an assisted living apartment, which Venkatesh has resisted as he reports he is still able to take care of most ADL's independently and likes where he lives at this time. Feels his brother has stopped speaking to him due to this.       Other:     Support system: Family    Living situation: House   Difficulty accessing and/or getting around living space: no   Other concerns: no     Employment history:      Current employment status:  Retired         Kind of work:        Spouses/SO current employment status:       Kind of work:     Education highest level: not asked     Financial:       Descriptor: not asked       Health insurance:     Legal concerns: not asked        Area(s) of concern:     Health Care Directive: Has one:  no       If yes, copy in EMR: No       Basic information regarding health care directive provided: no        Health Care Agent(s): No health care directive:  Surrogate  health care decision maker is per legal succession  POLST?     Medical History/Issues (patient account): Diagnosed in October 2019 treated with \"numerous drugs\" also went through treatment of \"taking red blood cells, putting them back and then " "putting the white cells back a week later to help my immune system\", chemotherapy, radiation to back for pain.  Hospitalized last month \"not sure if it was because of pneumonia or the chemo\".  SInce back, is not back to his current baseline and the most notable new symptom has been urinary incontinence.  He had a catheter placed yesterday and reports that he is now feeling much better.     Venkatesh shared he understands that his cancer is slow growing.  He wonders if the burdens of treatment justify the benefits.  He has a PET scan tomorrow and an appointment with his oncologist on Monday to discuss the results.  I encouraged him to talk to his oncologist about these questions.  He shared his family has told him they will support any choice he makes.       Pain/Discomfort Issues: incontinence, fatigue, low endurance     Coping: struggling with trying to figure out if this is his new baselien \"things keep happening and they have to keep adding things [to manage medical events]\" Shared he is \"pretty sure this is what it's going to be like until the end\".  Misses bowling and golfing.  Still tries to walk as much as he can but cannot do the 3 mile walk that he used to take daily. Enjoys seeing his son.     \"Sometimes my mood can be pretty rotten, but it's been better since the catheter was placed\".  He does not endorse persistently low mood, though I did not get to do a full depression screen.  Feels his mood fluctuates in relation to how he is feeling physically.  He is also preparing for another scan and is generally trying to get a sense of what to expect regarding his cancer and his health going forward.    Difficult to sit with the many unknowns of his situation  Regarding decision to continue with active treatment or not, uncertainty of prognosis based on these decisions, wonder if this is his new baseline, uncertainty over what to expect in terms of symptom burden / symptom management      Sleep: not asked "     Sexual Health/Intimacy: not asked     Mental Health History and Current Review of Symptoms (patient account):     Much of today's visit was spent providing education about palliative care and our role in his care as he is not currently followed by a palliative care medical provider. Did not get to do a formal depression screen     Depression in past?:     Treatment in past?:     Treatment currently?:      Depression symptoms currently?:  - Anhedonia:    - Hopeless or down mood:    - Sleep problems (too much or too little):    - Fatigue:    - Appetite (too much or too little):    - Excessively negative self perception:    - Trouble concentrating:    - Motor slowness:    - Current and/or recent thoughts of suicide:      Suicide risk screen:  - Past thoughts of suicide?    - Past attempts to end own life?    - If yes, how?   - Protective factors currently?   - Safety plan needed currently?     Anxiety in past?:    Treatment in past?     Treatment currently?       Anxiety symptoms currently?  - Nervous, on edge:    - Sleep problems:    - Worrying a lot/hard to manage worries:    Specific recent areas of worry/fear/concern:   - Tense, hard to relax:    - Feeling restless, hard to sit still:    - Irritable:    - Feeling of dread/ afraid that something awful may happen:    - How long?   - Impacts on daily life?             Grief vs Depression:  Endorses symptoms for:     Psychological Trauma and/or Major Losses:   Not asked     Nightmares?      Thought about when not wanting to think about?   Tried hard not to think about it?   Avoided situations that reminded you of it?   Felt constantly on guard, watchful, or easily startled?   Felt numb or detached from others, activities, or your surroundings?     Safety Screen:  History of being harmed or controlled by someone close to you?    Being hurt or controlled by someone close to you?    Worried will be harmed in future?    Worried will harm someone else in future?      CD  History:   Using alcohol actively?     Amount per week:   Current concerns (self or other) about alcohol or drug use?   Past concerns and/or CD treatment?         /Spirituality:       Belong to a  community:      Specify:        Identify with a particular Islam:       Identify as spiritual:       How find expression:     Hope:      What do you hope for:          What gives you hope:         Internal Resources (positive memories, sources of salvador):     Perceived Needs: Venkatesh was not sure if my services would be helpful at this point in time.      Resource needs/Referrals: None     Will complete diagnosis once assessment complete in future visit.               Intervention: Initial palliative care counseling / clinical social work evaluation was conducted.  Palliative Care Counseling interventions available were discussed, including counseling related to serious illness, behavioral interventions for symptom management, consultation regarding goals of care/health care directive/POLST, and other interventions specific to the patient's situation or concerns.     Plan: will follow up as needed.     DSM5 Diagnoses:   deferred    Time Spent with Patient/Family: 50 minutes  (Start 12:30, end 1:20)    CAT Ruelas, [facility]   Palliative Care    Pgr:[phone]  Ph: [phone]      DO NOT SEND ANY LETTERS          Again, thank you for allowing me to participate in the care of your patient.        Sincerely,        HERMINIO Ruelas     Pillow/Warm blanket/Patient informed

## 2022-07-13 ENCOUNTER — APPOINTMENT (OUTPATIENT)
Dept: ENDOCRINOLOGY | Facility: CLINIC | Age: 72
End: 2022-07-13

## 2022-07-13 VITALS
SYSTOLIC BLOOD PRESSURE: 126 MMHG | TEMPERATURE: 97.5 F | HEART RATE: 16 BPM | DIASTOLIC BLOOD PRESSURE: 82 MMHG | WEIGHT: 172 LBS | RESPIRATION RATE: 16 BRPM | OXYGEN SATURATION: 98 % | HEIGHT: 65.5 IN | BODY MASS INDEX: 28.31 KG/M2

## 2022-07-13 PROCEDURE — ZZZZZ: CPT

## 2022-07-13 PROCEDURE — 99214 OFFICE O/P EST MOD 30 MIN: CPT | Mod: 25

## 2022-07-13 PROCEDURE — 77080 DXA BONE DENSITY AXIAL: CPT

## 2022-07-14 LAB
25(OH)D3 SERPL-MCNC: 43.2 NG/ML
ALBUMIN SERPL ELPH-MCNC: 4.3 G/DL
ALP BLD-CCNC: 78 U/L
ALT SERPL-CCNC: 16 U/L
ANION GAP SERPL CALC-SCNC: 12 MMOL/L
AST SERPL-CCNC: 18 U/L
BILIRUB SERPL-MCNC: 0.8 MG/DL
BUN SERPL-MCNC: 39 MG/DL
CALCIUM SERPL-MCNC: 9.2 MG/DL
CALCIUM SERPL-MCNC: 9.2 MG/DL
CHLORIDE SERPL-SCNC: 109 MMOL/L
CO2 SERPL-SCNC: 21 MMOL/L
CREAT SERPL-MCNC: 1.78 MG/DL
EGFR: 40 ML/MIN/1.73M2
GLUCOSE SERPL-MCNC: 98 MG/DL
PARATHYROID HORMONE INTACT: 40 PG/ML
PHOSPHATE SERPL-MCNC: 3.2 MG/DL
POTASSIUM SERPL-SCNC: 4.8 MMOL/L
PROT SERPL-MCNC: 7.1 G/DL
SODIUM SERPL-SCNC: 142 MMOL/L
TSH SERPL-ACNC: 1.74 UIU/ML

## 2022-07-14 NOTE — PHYSICAL EXAM
[Alert] : alert [Well Nourished] : well nourished [No Acute Distress] : no acute distress [Normal Sclera/Conjunctiva] : normal sclera/conjunctiva [EOMI] : extra ocular movement intact [No Proptosis] : no proptosis [Normal Outer Ear/Nose] : the ears and nose were normal in appearance [No Neck Mass] : no neck mass was observed [No Respiratory Distress] : no respiratory distress [No Accessory Muscle Use] : no accessory muscle use [Normal Rate and Effort] : normal respiratory rate and effort [Clear to Auscultation] : lungs were clear to auscultation bilaterally [Normal S1, S2] : normal S1 and S2 [Normal Rate] : heart rate was normal [Regular Rhythm] : with a regular rhythm [Not Tender] : non-tender [Not Distended] : not distended [Soft] : abdomen soft [No Rash] : no rash [Oriented x3] : oriented to person, place, and time [Normal Affect] : the affect was normal [Normal Insight/Judgement] : insight and judgment were intact [Normal Mood] : the mood was normal [de-identified] : soft sytolic murmur

## 2022-07-14 NOTE — HISTORY OF PRESENT ILLNESS
[Calcium (dietary)] : dietary Calcium [Vitamin D (oral)] : Vitamin D orally [Taking Steroids] : a history of taking steroids [FreeTextEntry1] : Patient returns for a follow up visit for osteoporosis . Pt has not been on any treatment for osteoporosis. Pt overall bone density was stable but unfortunately decreased. Since the last visit pt has no interval health changes. \par \par 'History \par Patient is a 72 yo man with hx of nonischemic cardiomyopathy and heart failure s/p LVAD and heart transplant (2018) on chronic prednisone, and hx autoimmune hemolytic anemia. \par The patient received a heart transplant in February 23, 2018. The patient had left hip surgery years ago from arthritis. Denies any fracture history, calcium disorders, or kidney stones. No family history of osteoporosis. Patient does take chronic prednisone, currently 4 mg/day. Patient does not drink milk, rarely eats cheese, no yogurt, no fish. Mostly eats chicken, beef. He does takes calcium and vitamin D. He does not regularly visit the dentist, has not seen in a few years.\par \par Patient is currently taking prednisone 4mg/day. BMD May 2019 with femoral neck T score of -2.1, total hip T score of -1.2 and spine T score of -1.1. Repeat BMD 6/2021 demonstrates femoral neck T score of -1.5 (improving osteopenia), total hip T-score of -0.4 (normal), and spine T-score of -0.8 .  The patient elected to hold any osteoporosis therapy and wait for repeat bone density.  No interval fractures.

## 2022-07-14 NOTE — PROCEDURE
[FreeTextEntry1] : Bone Density July 13,2022\par Spine L1- L2 -2.3 osteopenia \par Total Hip-1.9 osteopenia \par Femoral Neck -2.6 osteoporosis \par Proximal Radius -1.1 osteopenia \par \par Bone Density June 2021\par Spine-0.8 normal \par Total Hip-0.4 nomal \par Femoral Neck -1.5 osteopenia \par Proximal Radius : not performed\par \par BMD May 2019 \par femoral neck -2.1 osteopenia \par total hip  -1.2  osteopenia \par spine -1.1 osteopenia

## 2022-07-14 NOTE — ASSESSMENT
[FreeTextEntry1] : Patient is a 72 yo man with hx of nonischemic cardiomyopathy and heart failure s/p LVAD and heart transplant (2018) on chronic prednisone, and hx autoimmune hemolytic anemia, here for evaluation of osteopenia\par \par Patient is currently taking prednisone 4mg/day. BMD May 2019 with femoral neck T score of -2.1, total hip T score of -1.2 and spine T score of -1.1. Repeat BMD 6/2021 demonstrates femoral neck T score of -1.5 (improving osteopenia), total hip T-score of -0.4 (normal), and spine T-score of -0.8 (normal). \par BMD 2022 shows some decrease in bone density in the femoral neck into the osteoporosis range. \par \par Patient advised for an increased risk of future fx. Options for medical therapy discussed in detail.  I discussed Rx with twice a year Prolia. Risks and benefits discussed including thigh pain, interval fx, and ONJ. I discussed that pt cannot stop Prolia without expecting rapid bone loss and increase in risk for future fx unless they transition to another Rx. Furthermore, there is a 10 year safety data for Prolia. All questions were answered. Rx information handout provided. Pt understands and elects to start Prolia . Medication instructions were reviewed. Prescription was sent out. Prior authorization is needed for this medication. \par I discussed the case with transplant nurse practitioner Lisa Ac who agrees with the use of denosumab.\par Labs 2022\par Calcium 9.4\par Creatinine 1.56 \par \par Follow up in 2-3 weeks  [Denosumab Therapy] : Risks  and benefits of denosumab therapy were discussed with the patient including eczema, cellulitis, osteonecrosis of the jaw and atypical femur fractures

## 2022-07-14 NOTE — END OF VISIT
[FreeTextEntry3] : This note was written by Annalisa Damian on ( July 13, 2022) acting as a medical scribe for Dr. Clemens.  This note was authored by the medical scribe for me. I have reviewed, edited, and revised the note as needed. I am in agreement with the exam findings, imaging findings, and treatment plan.  Ricardo Clemens MD

## 2022-07-25 ENCOUNTER — APPOINTMENT (OUTPATIENT)
Dept: CV DIAGNOSITCS | Facility: HOSPITAL | Age: 72
End: 2022-07-25

## 2022-07-25 ENCOUNTER — APPOINTMENT (OUTPATIENT)
Dept: HEART FAILURE | Facility: CLINIC | Age: 72
End: 2022-07-25

## 2022-07-25 ENCOUNTER — OUTPATIENT (OUTPATIENT)
Dept: OUTPATIENT SERVICES | Facility: HOSPITAL | Age: 72
LOS: 1 days | End: 2022-07-25
Payer: COMMERCIAL

## 2022-07-25 VITALS
HEIGHT: 65.5 IN | DIASTOLIC BLOOD PRESSURE: 82 MMHG | OXYGEN SATURATION: 98 % | SYSTOLIC BLOOD PRESSURE: 130 MMHG | BODY MASS INDEX: 28.48 KG/M2 | HEART RATE: 82 BPM | TEMPERATURE: 97.4 F | WEIGHT: 173 LBS

## 2022-07-25 DIAGNOSIS — Z94.1 HEART TRANSPLANT STATUS: ICD-10-CM

## 2022-07-25 DIAGNOSIS — Z86.19 PERSONAL HISTORY OF OTHER INFECTIOUS AND PARASITIC DISEASES: ICD-10-CM

## 2022-07-25 DIAGNOSIS — Z94.1 HEART TRANSPLANT STATUS: Chronic | ICD-10-CM

## 2022-07-25 PROCEDURE — 93306 TTE W/DOPPLER COMPLETE: CPT

## 2022-07-25 PROCEDURE — 93356 MYOCRD STRAIN IMG SPCKL TRCK: CPT

## 2022-07-25 PROCEDURE — 93306 TTE W/DOPPLER COMPLETE: CPT | Mod: 26

## 2022-07-25 PROCEDURE — 99215 OFFICE O/P EST HI 40 MIN: CPT

## 2022-07-26 LAB
ALBUMIN SERPL ELPH-MCNC: 4 G/DL
ALP BLD-CCNC: 73 U/L
ALT SERPL-CCNC: 18 U/L
ANION GAP SERPL CALC-SCNC: 10 MMOL/L
AST SERPL-CCNC: 16 U/L
BASOPHILS # BLD AUTO: 0.03 K/UL
BASOPHILS NFR BLD AUTO: 0.4 %
BILIRUB SERPL-MCNC: 0.9 MG/DL
BUN SERPL-MCNC: 39 MG/DL
CALCIUM SERPL-MCNC: 8.9 MG/DL
CHLORIDE SERPL-SCNC: 110 MMOL/L
CMV DNA SPEC QL NAA+PROBE: NOT DETECTED IU/ML
CMVPCR LOG: NOT DETECTED LOG10IU/ML
CO2 SERPL-SCNC: 22 MMOL/L
COVID-19 NUCLEOCAPSID  GAM ANTIBODY INTERPRETATION: NEGATIVE
COVID-19 SPIKE DOMAIN ANTIBODY INTERPRETATION: POSITIVE
CREAT SERPL-MCNC: 1.67 MG/DL
EGFR: 43 ML/MIN/1.73M2
EOSINOPHIL # BLD AUTO: 0.11 K/UL
EOSINOPHIL NFR BLD AUTO: 1.6 %
GLUCOSE SERPL-MCNC: 96 MG/DL
HCT VFR BLD CALC: 35.4 %
HGB BLD-MCNC: 11 G/DL
IMM GRANULOCYTES NFR BLD AUTO: 0.4 %
LYMPHOCYTES # BLD AUTO: 1.83 K/UL
LYMPHOCYTES NFR BLD AUTO: 26.5 %
MAGNESIUM SERPL-MCNC: 2 MG/DL
MAN DIFF?: NORMAL
MCHC RBC-ENTMCNC: 29.9 PG
MCHC RBC-ENTMCNC: 31.1 GM/DL
MCV RBC AUTO: 96.2 FL
MONOCYTES # BLD AUTO: 0.87 K/UL
MONOCYTES NFR BLD AUTO: 12.6 %
NEUTROPHILS # BLD AUTO: 4.03 K/UL
NEUTROPHILS NFR BLD AUTO: 58.5 %
PLATELET # BLD AUTO: 61 K/UL
POTASSIUM SERPL-SCNC: 4.9 MMOL/L
PROT SERPL-MCNC: 6.9 G/DL
RBC # BLD: 3.68 M/UL
RBC # FLD: 15.5 %
SARS-COV-2 AB SERPL IA-ACNC: 65.4 U/ML
SARS-COV-2 AB SERPL QL IA: 0.26 INDEX
SODIUM SERPL-SCNC: 142 MMOL/L
TACROLIMUS SERPL-MCNC: 11.2 NG/ML
WBC # FLD AUTO: 6.9 K/UL

## 2022-07-28 NOTE — H&P CARDIOLOGY - RESPIRATORY
Airway  Performed by: Amparo Buckley MD  Authorized by: Amparo Buckley MD     Final Airway Type:  Endotracheal airway  Final Endotracheal Airway*:  NATTY tube  ETT Size (mm)*:  4.5  Cuff*:  Regular  Technique Used for Successful ETT Placement:  Direct laryngoscopy  Devices/Methods Used in Placement*:  Mask  Intubation Procedure*:  Preoxygenation, ETCO2, Atraumatic, Dentition Unchanged and Pharynx Clear  Insertion Site:  Oral  Blade Type*:  Lewis  Blade Size*:  1  Cuff Volume (mL):  1.5  Placement Verified by: auscultation and capnometry    Glottic View*:  1 - full view of glottis  Attempts*:  1  Ventilation Between Attempts:  2 hand mask  Number of Other Approaches Attempted:  0   Patient Identified, Procedure confirmed, Emergency equipment available and Safety protocols followed  Location:  OR  Urgency:  Elective  Difficult Airway: No    Indications for Airway Management:  Anesthesia  Spontaneous Ventilation: absent    Sedation Level:  Anesthetized  Mask Difficulty Assessment:  1 - vent by mask  Performed By:  Anesthesiologist  Anesthesiologist:  Amparo Buckley MD  Start Time: 7/28/2022 8:23 AM     Breath Sounds equal & clear to percussion & auscultation, no accessory muscle use

## 2022-07-29 NOTE — PROGRESS NOTE ADULT - SUBJECTIVE AND OBJECTIVE BOX
ID Follow Up      Interval History/ROS:Patient is a 70y old  Male who presents with a chief complaint of SOB/anemia (04 Sep 2020 11:09)  Reports feeling better today with decreased swelling. Taking ice chips. Denies fever, chills, nausea, vomiting.     Allergies  No Known Allergies    Intolerances  None      ANTIMICROBIALS:  vancomycin    Solution 500 every 6 hours  vancomycin  Retention Enema 500 every 6 hours      OTHER MEDS:  benzocaine 15 mG/menthol 3.6 mG (Sugar-Free) Lozenge 1 Lozenge Oral every 6 hours PRN  chlorhexidine 2% Cloths 1 Application(s) Topical <User Schedule>  cycloSPORINE  (SandIMMUNE) IVPB 10 milliGRAM(s) IV Intermittent every 24 hours  dexMEDEtomidine Infusion 0.25 MICROgram(s)/kG/Hr IV Continuous <Continuous>  Eravacycline (Xerava) 75 milliGRAM(s),Sodium Chloride 0.9% 150 milliLiter(s) 75 milliGRAM(s) IV Intermittent every 12 hours  heparin   Injectable 5000 Unit(s) SubCutaneous every 8 hours  insulin regular Infusion 1 Unit(s)/Hr IV Continuous <Continuous>  methylPREDNISolone sodium succinate Injectable 16 milliGRAM(s) IV Push <User Schedule>  pantoprazole  Injectable 40 milliGRAM(s) IV Push two times a day  Parenteral Nutrition - Adult 1 Each TPN Continuous <Continuous>  Parenteral Nutrition - Adult 1 Each TPN Continuous <Continuous>      Vital Signs Last 24 Hrs  T(C): 36.6 (04 Sep 2020 16:00), Max: 36.6 (04 Sep 2020 16:00)  T(F): 97.9 (04 Sep 2020 16:00), Max: 97.9 (04 Sep 2020 16:00)  HR: 121 (04 Sep 2020 16:00) (101 - 121)  BP: 184/96 (04 Sep 2020 16:00) (120/80 - 193/81)  BP(mean): 134 (04 Sep 2020 16:00) (65 - 136)  RR: 22 (04 Sep 2020 16:00) (11 - 53)  SpO2: 100% (04 Sep 2020 16:00) (97% - 100%)    EXAM:  Constitutional: Not in acute distress  Eyes: No icterus. Pupils b/l reactive to light.  Oral cavity: Clear, no lesions, NGT in place  RS: clear breath sounds bilaterally. No wheeze/rhonchi/crepitations.  CVS: S1, S2 heard. Regular rate and rhythm. No murmurs/rubs/gallops.  Abdomen: distended, +bowel sounds. No guarding or tenderness.  : No acute abnormalities  Extremities: Warm. UE edema R>L +2 pitting edema. LE b/l +2 pitting edema.  Skin: left neck CVC in place, Left arm PICC in place  Vascular: No evidence of phlebitis  Neuro: Alert, oriented to time/place/person      Labs:                        8.3    21.16 )-----------( 171      ( 04 Sep 2020 00:35 )             25.8       09-04    150<H>  |  115<H>  |  90<H>  ----------------------------<  214<H>  3.8   |  26  |  1.61<H>    Ca    8.0<L>      04 Sep 2020 00:36  Phos  3.4     09-03  Mg     2.2     09-04    TPro  7.3  /  Alb  1.8<L>  /  TBili  1.0  /  DBili  x   /  AST  59<H>  /  ALT  24  /  AlkPhos  91  09-04          MICROBIOLOGY:    RADIOLOGY:  < from: [facility] Duplex Upper Ext Vein Scan, Right (08.31.20 @ 16:18) >  IMPRESSION:  No evidence of right upper extremity deep venous thrombosis.    < end of copied text > Dupixent Counseling: I discussed with the patient the risks of dupilumab including but not limited to eye infection and irritation, cold sores, injection site reactions, worsening of asthma, allergic reactions and increased risk of parasitic infection.  Live vaccines should be avoided while taking dupilumab. Dupilumab will also interact with certain medications such as warfarin and cyclosporine. The patient understands that monitoring is required and they must alert us or the primary physician if symptoms of infection or other concerning signs are noted.

## 2022-08-03 ENCOUNTER — APPOINTMENT (OUTPATIENT)
Dept: ENDOCRINOLOGY | Facility: CLINIC | Age: 72
End: 2022-08-03

## 2022-08-03 PROCEDURE — 96401 CHEMO ANTI-NEOPL SQ/IM: CPT

## 2022-08-03 RX ORDER — DENOSUMAB 60 MG/ML
60 INJECTION SUBCUTANEOUS
Qty: 1 | Refills: 0 | Status: COMPLETED | OUTPATIENT
Start: 2022-07-28

## 2022-08-08 ENCOUNTER — OUTPATIENT (OUTPATIENT)
Dept: OUTPATIENT SERVICES | Facility: HOSPITAL | Age: 72
LOS: 1 days | Discharge: HOME | End: 2022-08-08

## 2022-08-08 DIAGNOSIS — D64.9 ANEMIA, UNSPECIFIED: ICD-10-CM

## 2022-08-08 DIAGNOSIS — Z94.1 HEART TRANSPLANT STATUS: Chronic | ICD-10-CM

## 2022-08-09 ENCOUNTER — APPOINTMENT (OUTPATIENT)
Dept: HEMATOLOGY ONCOLOGY | Facility: CLINIC | Age: 72
End: 2022-08-09

## 2022-08-11 ENCOUNTER — APPOINTMENT (OUTPATIENT)
Dept: HEMATOLOGY ONCOLOGY | Facility: CLINIC | Age: 72
End: 2022-08-11

## 2022-08-11 ENCOUNTER — RESULT REVIEW (OUTPATIENT)
Age: 72
End: 2022-08-11

## 2022-08-11 VITALS
DIASTOLIC BLOOD PRESSURE: 81 MMHG | WEIGHT: 176.37 LBS | OXYGEN SATURATION: 97 % | BODY MASS INDEX: 28.9 KG/M2 | HEART RATE: 91 BPM | TEMPERATURE: 97.5 F | SYSTOLIC BLOOD PRESSURE: 126 MMHG | RESPIRATION RATE: 16 BRPM

## 2022-08-11 PROBLEM — Z86.19 HISTORY OF CMV: Status: ACTIVE | Noted: 2020-10-01

## 2022-08-11 LAB
BASOPHILS # BLD AUTO: 0.02 K/UL — SIGNIFICANT CHANGE UP (ref 0–0.2)
BASOPHILS NFR BLD AUTO: 0.3 % — SIGNIFICANT CHANGE UP (ref 0–2)
EOSINOPHIL # BLD AUTO: 0.1 K/UL — SIGNIFICANT CHANGE UP (ref 0–0.5)
EOSINOPHIL NFR BLD AUTO: 1.5 % — SIGNIFICANT CHANGE UP (ref 0–6)
HCT VFR BLD CALC: 33.9 % — LOW (ref 39–50)
HGB BLD-MCNC: 10.5 G/DL — LOW (ref 13–17)
IMM GRANULOCYTES NFR BLD AUTO: 1 % — SIGNIFICANT CHANGE UP (ref 0–1.5)
LYMPHOCYTES # BLD AUTO: 1.48 K/UL — SIGNIFICANT CHANGE UP (ref 1–3.3)
LYMPHOCYTES # BLD AUTO: 21.8 % — SIGNIFICANT CHANGE UP (ref 13–44)
MCHC RBC-ENTMCNC: 30.1 PG — SIGNIFICANT CHANGE UP (ref 27–34)
MCHC RBC-ENTMCNC: 31 G/DL — LOW (ref 32–36)
MCV RBC AUTO: 97.1 FL — SIGNIFICANT CHANGE UP (ref 80–100)
MONOCYTES # BLD AUTO: 0.88 K/UL — SIGNIFICANT CHANGE UP (ref 0–0.9)
MONOCYTES NFR BLD AUTO: 13 % — SIGNIFICANT CHANGE UP (ref 2–14)
NEUTROPHILS # BLD AUTO: 4.24 K/UL — SIGNIFICANT CHANGE UP (ref 1.8–7.4)
NEUTROPHILS NFR BLD AUTO: 62.4 % — SIGNIFICANT CHANGE UP (ref 43–77)
NRBC # BLD: 0 /100 WBCS — SIGNIFICANT CHANGE UP (ref 0–0)
PLATELET # BLD AUTO: 55 K/UL — LOW (ref 150–400)
RBC # BLD: 3.49 M/UL — LOW (ref 4.2–5.8)
RBC # FLD: 15.2 % — HIGH (ref 10.3–14.5)
RETICS #: 101.6 K/UL — SIGNIFICANT CHANGE UP (ref 25–125)
RETICS/RBC NFR: 2.9 % — HIGH (ref 0.5–2.5)
WBC # BLD: 6.79 K/UL — SIGNIFICANT CHANGE UP (ref 3.8–10.5)
WBC # FLD AUTO: 6.79 K/UL — SIGNIFICANT CHANGE UP (ref 3.8–10.5)

## 2022-08-11 PROCEDURE — 99213 OFFICE O/P EST LOW 20 MIN: CPT

## 2022-08-11 RX ORDER — TACROLIMUS 0.5 MG/1
0.5 CAPSULE ORAL
Qty: 60 | Refills: 5 | Status: DISCONTINUED | COMMUNITY
Start: 2022-04-05 | End: 2022-08-11

## 2022-08-11 RX ORDER — ASCORBIC ACID 500 MG
500 TABLET ORAL DAILY
Qty: 30 | Refills: 0 | Status: DISCONTINUED | COMMUNITY
Start: 2022-04-19 | End: 2022-08-11

## 2022-08-11 RX ORDER — PREDNISONE 1 MG/1
1 TABLET ORAL
Qty: 270 | Refills: 3 | Status: DISCONTINUED | COMMUNITY
Start: 2021-11-17 | End: 2022-08-11

## 2022-08-11 RX ORDER — PREDNISONE 5 MG/1
5 TABLET ORAL
Qty: 30 | Refills: 5 | Status: DISCONTINUED | COMMUNITY
Start: 2022-08-11 | End: 2022-08-11

## 2022-08-11 NOTE — DISCUSSION/SUMMARY
[___ Month(s)] : in [unfilled] month(s) [FreeTextEntry1] : Mr Baez is 71 y/o M with PMHx NICM s/p OHT 2/23/18. Most recent issues include hemolytic anemia stable, no recent rejection or infection, osteopenia and tophaceous gout. 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 (seen on CT angio 1/19/21)\par - DSA February 2022 normal\par - annual R/LHC hemos normal, some dilatation in LAD (2/25/22), EMBx ISHLT Grade 0R, DSA negative,pAMR 1+, +C4d\par - hx of depressed EF 50-55% in 11/2020, now normalized\par - continue metoprolol 200mg daily\par - continue losartan 100mg daily\par \par #PPx\par - completed all prophylaxis medications\par - check CMV PCR monthly (last negative 7/25/22)\par \par #Autoimmune hemolytic anemia\par - followed by Dr Rosario, appreciate recs\par - pt has f/u appt in August\par - continue folic acid daily\par - continue CBC with diff monthly\par \par #Hx stage III CKD\par - stable\par \par #tophaceous gout\par - followed by rheumatology, need f/u appt\par \par #Osteoporosis\par - followed by Dr. Cuadra, appreciate recs\par - DEXA 2022 showed some decrease in bone density in the femoral neck\par - pt scheduled to receive Prolia injection\par \par #Elevated PSA\par - f/u with urology\par - PSA =6.7 on 4/29/22\par \par #Chronic RIght knee pain, osteoarthritis\par - stable\par \par #hyperkalemia\par - continue lokelma 10gm daily\par \par #health maintenance\par - continue vitamin D daliy and folic acid daily\par  \par RTC/one month with Dr Campbell\par

## 2022-08-11 NOTE — CARDIOLOGY SUMMARY
[de-identified] : DSE 12/22/2020: Conclusions:\par 1. Normal hemodynamic response.\par 2. Normal electrocardiographic response.\par 3. Overall preserved left ventricular systolic function\par with mild hypokinesis of the mid to distal anterior wall at\par baseline. Normal augmentation in left ventricular systolic\par function with dobutamine infusion.\par 4. No evidence of inducible ischemia on stress\par echocardiogram images.\par *** Compared with echocardiogram of 12/2/2020, overall\par preserved left ventricular systolic function at baseline\par with mild hypokiesis of the mid to distal anterior wall.\par Normal augmentation in left ventricular systolic function\par with dobutamine infusion. [de-identified] : \par TTE 7/25/22: EF 60%, LVIDD 4.5cm, normal LV function, normal RV size with decreased RV function, mild TR, no pericardial effusion\par \par TTE 2/28/22: EF 65% LVIDD not calculated. normal biV function. 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 minimal TR. no pericardial effusion. 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  No additional coronary-cameral fistulae noted. 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 IVUS performed of the RCA\par which was normal. Normal RHC\par \par 12/2/2020: RHC/Biopsy ISHLT Grade 0R\par 11/18/2020:  L/RHC w/ IVUS:\par CORONARY VESSELS: The coronary circulation is right dominant.\par LM:   --  LM: The vessel was very large sized. Angiography showed\par aneurysmal dilatation.\par LAD:   --  Proximal LAD: The vessel was very large sized. Angiography\par showed aneurysmal dilatation.\par --  Mid LAD: The vessel was very large sized and excessively ectatic. A\par fistula to the left ventricle was identified.\par --  Distal LAD: Normal. 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 No significant intimal thickening/hyperplasia or\par plaque was observed.

## 2022-08-11 NOTE — PHYSICAL EXAM
[Well Developed] : well developed [Well Nourished] : well nourished [Normal] : normal S1, S2, no murmur, no rub, no gallop [Normal S1, S2] : normal S1, S2 [de-identified] : no jvd [de-identified] : +III/VI diastolic murmur, tachycardic

## 2022-08-11 NOTE — HISTORY OF PRESENT ILLNESS
[FreeTextEntry1] : Mr. Baez is a 71 year old man with past medical history of a nonischemic cardiomyopathy and chronic systolic heart failure s/p HM2 LVAD in June 2017 complicated by recurrent GI hemorrhage and possible pump thrombosis who underwent heart transplant on 2/23/18 with a hepatitis C positive donor. His course was complicated by bilateral IJ/subclavian thrombi, a persistent small right sided pleural effusion, as well as acute on chronic renal failure of unclear etiology. In addition, his post-operative course was complicated by graft dysfunction of unclear etiology and persistent C4d positive staining on endomyocardial biopsy with negative DSAs. He developed joshua evidence of graft dysfunction leading to treatment with plasmapheresis, IVIG, and rituximab. Subsequently in June of 2018 he was found to have an pneumonia, that was ultimately diagnosed as Nocardia. This was successfully treated with therapy completed in August 2019.\par \par In the spring 2020, he was admitted with hemolytic anemia of unclear etiology. There were no clear precipitating factors, such as infection. He was treated with prednisone and Rituximab. Given the potential for CNI inhibitors leading to hemolytic anemia, we transitioned him to everolimus and he remained on high dose prednisone. He was subsequently admitted with acute anemia (not hemolytic). He developed severe leukopenia and Klebsiella bacteremia. Following treatment of this, he developed a severe C. diff colitis infection leading to a prolonged recovery. He was weaned to lower dose prednisone and the everolimus was transitioned back to tacrolimus. At the time of discharge he was found to have low levels of CMV viremia and was started back on valganciclovir. \par \par 4th annual R/LHC and biopsy (2/28/2022) showed normal hemodynamics EMBX ISHLT Grade 0R, IF C4D 50% +1 staining, and larger LAD.\par \par Pt here today for routine follow up clinic visit. Since last seen he reports doing ok. He continues to walks 5-6 blocks with a cane, limited by b/l knee pain.  He [facility] and eats 2 meals/daily there. Still reports sometimes still feels "abdominal tightness" and b/l shoulder discomfort, but not worsening. He was seen by Hematology in April and no medication changes were made. He has no abdominal complaints or diarrhea. Has normal bowel movements.  He has no PND, orthopnea, cough, or swelling in the legs.  He has no fevers, chills, sweats, dysuria, hematuria, or headaches.  Appetite good, and sleeping well. Received covid booster and +Covid PCR in january, he was asymptomatic, no treatment.\par \par Health maintenance:\par DEXA 5/2019, 6/2021: osteopenia, 6/2022: worsening osteopenia. Followed by Dr. Clemens.\par CT Colon (4/2017) IMPRESSION: No colonic polyp or mass.\par PSA 6.76 ( 4/29/2022)\par HgA1c 5.3% (1/26/22)

## 2022-08-12 NOTE — HISTORY OF PRESENT ILLNESS
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 Treatment - prednisone/Rituxan x 1\par 2/2018 Cardiac transplant\par Bilateral subclavian thrombosis\par UGI bleed [de-identified] : Feels well. He complains of chronic left knee pain-takes Tylenol as needed; this helps. . No other c/o. No chest pain, SOB, abdominal pain, jaundice, dark urine, melena, BRBPR, fever, night sweats, swollen glands, arthritis, rash, limb pain/swelling. Weight stable. Prednisone was tapered to 3 mg daily in April of this year.

## 2022-08-12 NOTE — CONSULT LETTER
[Dear  ___] : Dear  [unfilled], [Courtesy Letter:] : I had the pleasure of seeing your patient, [unfilled], in my office today. [Please see my note below.] : Please see my note below. [Sincerely,] : Sincerely, [DrEduar  ___] : Dr. HAN [FreeTextEntry2] : Dr. Lisa Ac [FreeTextEntry3] : Tao Rosario M.D., FACP\par Professor of Medicine\par [facility] [employer]\par Associate Chief, Division of Hematology\par [facility]\par [facility]\par [address] [place] [address]\[place] [address]\par [phone]\par \par \par \par

## 2022-08-12 NOTE — ASSESSMENT
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 Appears to be in remission with well compensated hemolytic anemia on low dose prednisone. Platelets decreased to 55 today, Hgb has dropped to 10.5.  Care discussed with Dr Rosario-will increase Prednisone to 5 mg daily.  Plan reviewed with pt and Cardiology NP Lisa Ac.  \par \par Plan:\par Increase Prednisone to 5 mg daily\par Tacrolimus/ ASA per Cardiology\par Folic acid 1 mg daily\par To ER prn fever\par FU in 2 weeks. \par

## 2022-08-12 NOTE — PHYSICAL EXAM
[Fully active, able to carry on all pre-disease performance without restriction] : Status 0 - Fully active, able to carry on all pre-disease performance without restriction [Normal] : affect appropriate [de-identified] : RRR. S1S2 normal. Gr 2/6 holosystolic murmur LLSB. No gallops.

## 2022-08-24 NOTE — PROGRESS NOTE ADULT - PROBLEM SELECTOR PLAN 3
[facility] EMERGENCY DEPARTMENT ENCOUNTER    Patient : Laura Santos Age: 12 year old Sex: female   MRN: [id number] Encounter Date: 8/23/2022  PCP: Verify Pcp      CHIEF COMPLAINT    Chief Complaint   Patient presents with   • Leg Pain       History of Present Illness     Laura Santos is a 12 year old female who presents to the ED with chief complaint of right leg pain.  Pt c/o intermittent pain in the right lower leg x 2 weeks.  She denies any acute injury or trauma.  She denies any pain in the right hip, thigh, knee, ankle, or foot.  She denies any low back pain.  She denies any numbness, tingling, weakness, swelling, or redness throughout the RLE.  She took 200mg advil one time since her pain began which provided moderate relief.  Father at bedside states that they rented a paddle boat the day before her pain started and he thinks that maybe her leg is still sore from that.  Pt has been able to ambulate without difficulty or assistance.  She has had no recent fevers, chills, rashes, headache, URI sx, cough, sob, abd pain, vomiting, diarrhea, or other acute complaints.         Health Status     ALLERGIES:   Allergen Reactions   • Amoxicillin-Pot Clavulanate RASH   • Penicillins RASH       There are no discharge medications for this patient.      There are no discharge medications for this patient.      Past Medical History     Past Medical History:   Diagnosis Date   • No known problems        Past Surgical History:   Procedure Laterality Date   • No past surgeries         Family History   Problem Relation Age of Onset   • Patient is unaware of any medical problems Mother    • Patient is unaware of any medical problems Father    • Diabetes Maternal Grandmother    • Patient is unaware of any medical problems Maternal Grandfather    • Hypertension Paternal Grandmother    • Heart disease Paternal Grandfather    • Diabetes Paternal Grandfather               Review of Systems      All systems otherwise  negative, ROS reviewed as documented in chart.      Physical Exam     ED Triage Vitals [08/23/22 1651]   /67   Heart Rate 97   Resp 17   Temp 99 °F (37.2 °C)   SpO2 98 %       Physical Exam    General:  Alert. No acute distress.    Skin:  Warm. Intact.    Head:  Normocephalic. Atraumatic.    Neck:  Supple.   Eye:  EOMI. Normal conjunctiva.  ENMT: Oral mucosa moist.  Cardiovascular:  Normal peripheral perfusion.   Respiratory:  Respirations are non-labored.  No respiratory distress.   Musculoskeletal:  No bony ttp of right hip, thigh, knee, lower leg, ankle, or foot.  No calf ttp, swelling, or erythema.  Normal strength and sensation throughout BLE.  No rashes or skin abnormalities.  +2 DP pulse.  Normal ROM. NVI.   Neurological:  A/O x 4.    Psychiatric:  Cooperative. Appropriate mood & affect.     Medical Decision Making     Rationale:  Multiple differential diagnoses were considered. The patient / caregivers were apprised of diagnostic / treatment options including alternate modes of care, in addition to the risks and benefits, for this medical condition. Based on this discussion the patient / caregiver agrees with this chosen diagnostic and treatment plan.    Orders   Medications - No data to display        LABORATORY RESULTS    No results found for this visit on 08/23/22.      RADIOLOGY RESULTS  XR TIBIA FIBULA 2 VIEWS RIGHT   Final Result   FINDINGS/IMPRESSION:  The joint spaces are normal.  No fractures or   dislocations are seen.  No bony abnormalities are identified.      Electronically Signed by: RANDY SARMIENTO M.D.    Signed on: 8/23/2022 7:40 PM                MDM:      Multiple differential diagnoses were considered. The patient / caregivers were apprised of diagnostic / treatment options including alternate modes of care, in addition to risks and benefits, for this medical condition. Based on this discussion the patient / caregiver agrees with this chosen diagnostic and treatment plan.    ED  Course     Vitals:    08/23/22 1651 08/23/22 1720   BP: 102/67 103/62   BP Location:  RUE - Right upper extremity   Patient Position:  Sitting   Pulse: 97 92   Resp: 17 18   Temp: 99 °F (37.2 °C)    TempSrc: Oral    SpO2: 98% 100%   Weight:  40.7 kg (89 lb 11.6 oz)   LMP: 07/18/2022       ED Course as of 08/23/22 2042   Tue Aug 23, 2022   1949 Pt resting comfortably in exam room.  Discussed diagnostic results.  Pt and father at bedside agree with plan for dc home and outpt follow up with PCP.  Advised father to give ibuprofen q6h as needed for pain.  Discussed return precautions.  All questions answered. [VT]      ED Course User Index  [VT] Vanessa Bah PA-C           Procedures           Impression and Plan     The encounter diagnosis was Pain of right lower extremity.      Disposition:  Time: 1949      Condition:  STABLE    Discharged to Home .    Discharge Instructions were provided.    Follow Up:  Hernandez Gamboa MD  [address]  [address]  [facility] [id number] [phone]             There are no discharge medications for this patient.        Counseled:  Patient, Family, Regarding diagnosis, Regarding diagnostic results, Regarding treatment, Patient understood and Family understood      DISCUSSION OF PLAN AND IMPORTANCE OF FOLLOW-UP CARE:  The plan was discussed verbally and key components were summarized in the discharge instructions. All questions were answered. In discussing management and follow-up, they are advised that the plan is based only on information that was available at the time of emergency department evaluation. Additional testing and treatment may be necessary, and outpatient evaluation is frequently required to ensure complete care. At the same time, there is always potential for symptoms to recur or worsen, or for additional signs or symptoms to develop that could substantially affect the treatment plan. If any new or uncontrolled symptoms occur, or if there are any other  concerns, they are advised to seek medical evaluation immediately.      Signed:  Vanessa Bah PA-C  8/23/2022    Patient seen independently with physician supervision available as needed.       Vanessa Bah PA-C  08/23/22 2042     - diarrhea seems to have resolved.  - complete course of oral vanco and IV Flagyl

## 2022-08-25 NOTE — PROGRESS NOTE ADULT - ATTENDING COMMENTS
Agree with assessment and plan as above by Dr. Brandon. Reviewed all pertinent labs, glucose values, and imaging studies. Modifications made as indicated above. Decrease Lantus to 15 units qhs. Continue with Humalog 5 units with meals. Can discharge on these doses with outpatient follow-up.     Hi Scruggs D.O  [phone]
No

## 2022-08-26 ENCOUNTER — RESULT REVIEW (OUTPATIENT)
Age: 72
End: 2022-08-26

## 2022-08-26 ENCOUNTER — APPOINTMENT (OUTPATIENT)
Dept: ENDOCRINOLOGY | Facility: CLINIC | Age: 72
End: 2022-08-26

## 2022-08-26 ENCOUNTER — APPOINTMENT (OUTPATIENT)
Dept: HEMATOLOGY ONCOLOGY | Facility: CLINIC | Age: 72
End: 2022-08-26

## 2022-08-26 VITALS
SYSTOLIC BLOOD PRESSURE: 127 MMHG | BODY MASS INDEX: 28.54 KG/M2 | RESPIRATION RATE: 16 BRPM | OXYGEN SATURATION: 97 % | DIASTOLIC BLOOD PRESSURE: 81 MMHG | WEIGHT: 174.14 LBS | HEART RATE: 82 BPM | TEMPERATURE: 97.5 F

## 2022-08-26 LAB
BASOPHILS # BLD AUTO: 0.03 K/UL — SIGNIFICANT CHANGE UP (ref 0–0.2)
BASOPHILS NFR BLD AUTO: 0.3 % — SIGNIFICANT CHANGE UP (ref 0–2)
EOSINOPHIL # BLD AUTO: 0.11 K/UL — SIGNIFICANT CHANGE UP (ref 0–0.5)
EOSINOPHIL NFR BLD AUTO: 1.2 % — SIGNIFICANT CHANGE UP (ref 0–6)
HCT VFR BLD CALC: 35.5 % — LOW (ref 39–50)
HGB BLD-MCNC: 11 G/DL — LOW (ref 13–17)
IMM GRANULOCYTES NFR BLD AUTO: 0.5 % — SIGNIFICANT CHANGE UP (ref 0–1.5)
LYMPHOCYTES # BLD AUTO: 1.96 K/UL — SIGNIFICANT CHANGE UP (ref 1–3.3)
LYMPHOCYTES # BLD AUTO: 21.1 % — SIGNIFICANT CHANGE UP (ref 13–44)
MCHC RBC-ENTMCNC: 30.1 PG — SIGNIFICANT CHANGE UP (ref 27–34)
MCHC RBC-ENTMCNC: 31 G/DL — LOW (ref 32–36)
MCV RBC AUTO: 97 FL — SIGNIFICANT CHANGE UP (ref 80–100)
MONOCYTES # BLD AUTO: 0.97 K/UL — HIGH (ref 0–0.9)
MONOCYTES NFR BLD AUTO: 10.5 % — SIGNIFICANT CHANGE UP (ref 2–14)
NEUTROPHILS # BLD AUTO: 6.15 K/UL — SIGNIFICANT CHANGE UP (ref 1.8–7.4)
NEUTROPHILS NFR BLD AUTO: 66.4 % — SIGNIFICANT CHANGE UP (ref 43–77)
NRBC # BLD: 0 /100 WBCS — SIGNIFICANT CHANGE UP (ref 0–0)
PLATELET # BLD AUTO: 80 K/UL — LOW (ref 150–400)
RBC # BLD: 3.66 M/UL — LOW (ref 4.2–5.8)
RBC # FLD: 14.8 % — HIGH (ref 10.3–14.5)
RETICS #: 80.5 K/UL — SIGNIFICANT CHANGE UP (ref 25–125)
RETICS/RBC NFR: 2.2 % — SIGNIFICANT CHANGE UP (ref 0.5–2.5)
WBC # BLD: 9.27 K/UL — SIGNIFICANT CHANGE UP (ref 3.8–10.5)
WBC # FLD AUTO: 9.27 K/UL — SIGNIFICANT CHANGE UP (ref 3.8–10.5)

## 2022-08-26 PROCEDURE — 99213 OFFICE O/P EST LOW 20 MIN: CPT

## 2022-08-26 NOTE — H&P ADULT. - PMH
show CHF (Congestive Heart Failure)    HTN    Non-Ischemic Cardiomyopathy    PAF (paroxysmal atrial fibrillation)  on xarelto  SVT (Supraventricular Tachycardia)    Ventricular fibrillation  s/p AICD

## 2022-08-29 LAB
ALBUMIN SERPL ELPH-MCNC: 4.2 G/DL
ALP BLD-CCNC: 70 U/L
ALT SERPL-CCNC: 13 U/L
ANION GAP SERPL CALC-SCNC: 11 MMOL/L
AST SERPL-CCNC: 15 U/L
BASOPHILS # BLD AUTO: 0.03 K/UL
BASOPHILS NFR BLD AUTO: 0.3 %
BILIRUB SERPL-MCNC: 0.6 MG/DL
BUN SERPL-MCNC: 39 MG/DL
CALCIUM SERPL-MCNC: 8.8 MG/DL
CHLORIDE SERPL-SCNC: 108 MMOL/L
CMV DNA SPEC QL NAA+PROBE: NOT DETECTED IU/ML
CMVPCR LOG: NOT DETECTED LOG10IU/ML
CO2 SERPL-SCNC: 22 MMOL/L
CREAT SERPL-MCNC: 1.73 MG/DL
EGFR: 41 ML/MIN/1.73M2
EOSINOPHIL # BLD AUTO: 0.1 K/UL
EOSINOPHIL NFR BLD AUTO: 1 %
GLUCOSE SERPL-MCNC: 87 MG/DL
HCT VFR BLD CALC: 27.5 %
HGB BLD-MCNC: 8.1 G/DL
IMM GRANULOCYTES NFR BLD AUTO: 0.4 %
LYMPHOCYTES # BLD AUTO: 1.83 K/UL
LYMPHOCYTES NFR BLD AUTO: 19.1 %
MAGNESIUM SERPL-MCNC: 1.8 MG/DL
MAN DIFF?: NORMAL
MCHC RBC-ENTMCNC: 29.5 GM/DL
MCHC RBC-ENTMCNC: 29.9 PG
MCV RBC AUTO: 101.5 FL
MONOCYTES # BLD AUTO: 1.04 K/UL
MONOCYTES NFR BLD AUTO: 10.8 %
NEUTROPHILS # BLD AUTO: 6.55 K/UL
NEUTROPHILS NFR BLD AUTO: 68.4 %
PLATELET # BLD AUTO: 95 K/UL
POTASSIUM SERPL-SCNC: 5 MMOL/L
PROT SERPL-MCNC: 6.9 G/DL
RBC # BLD: 2.71 M/UL
RBC # BLD: 2.71 M/UL
RBC # FLD: 15.2 %
RETICS # AUTO: 2.6 %
RETICS AGGREG/RBC NFR: 71.3 K/UL
SODIUM SERPL-SCNC: 141 MMOL/L
TACROLIMUS SERPL-MCNC: 10 NG/ML
WBC # FLD AUTO: 9.59 K/UL

## 2022-08-30 NOTE — ASSESSMENT
[Palliative Care Plan] : not applicable at this time [FreeTextEntry1] : 73 YO M S/P cardiac transplant developed mixed type autoimmune hemolytic anemia while on immunosuppression. Appears to be in remission with well compensated hemolytic anemia on low dose prednisone. Platelets decreased to 55 today, Hgb has dropped to 10.5.  Care discussed with Dr Rosario-will increase Prednisone to 5 mg daily.  Plan reviewed with pt and Cardiology NP Lisa Ac. Hgb is 8.1 today., denies any bleeding.  Will repeat labs in one week.\par \par Plan:\par Prednisone 5 mg daily\par Tacrolimus/ ASA per Cardiology\par Folic acid 1 mg daily\par To ER prn fever\par BW in one week.\par FU in 2 weeks. \par

## 2022-08-30 NOTE — PHYSICAL EXAM
[Fully active, able to carry on all pre-disease performance without restriction] : Status 0 - Fully active, able to carry on all pre-disease performance without restriction [Normal] : affect appropriate [de-identified] : RRR. S1S2 normal. Gr 2/6 holosystolic murmur LLSB. No gallops.

## 2022-08-30 NOTE — HISTORY OF PRESENT ILLNESS
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 Treatment - prednisone/Rituxan x 1\par 2/2018 Cardiac transplant\par Bilateral subclavian thrombosis\par UGI bleed [de-identified] : Feels well. He complains of chronic left knee pain-takes Tylenol as needed; this helps. . No other c/o. No chest pain, SOB, abdominal pain, jaundice, dark urine, melena, BRBPR, fever, night sweats, swollen glands, arthritis, rash, limb pain/swelling. Weight stable. Had increased Prednisone to 5 mg daily 2 weeks ago d/t drop in Hgb. .

## 2022-08-30 NOTE — CONSULT LETTER
[Dear  ___] : Dear  [unfilled], [Courtesy Letter:] : I had the pleasure of seeing your patient, [unfilled], in my office today. [Please see my note below.] : Please see my note below. [Sincerely,] : Sincerely, [DrEduar  ___] : Dr. HAN [FreeTextEntry2] : Dr. Lisa Ac [FreeTextEntry3] : Tao Rosario M.D., FACP\par Professor of Medicine\par [facility] [employer]\par Associate Chief, Division of Hematology\par [facility]\par [facility]\par [address] [place] [address]\[place] [address]\par [phone]\par \par \par \par

## 2022-09-06 NOTE — HISTORY OF PRESENT ILLNESS
CTTS attempted to reach client. CTTS left voicemail due to client not showing for intake.         Electronically signed by MIGUEL Magana on 9/6/2022 at 2:43 PM
[FreeTextEntry1] : Mr. Baez is a 70 year old man with past medical history of a nonischemic cardiomyopathy and chronic systolic heart failure s/p HM2 LVAD in June 2017 complicated by recurrent GI hemorrhage and possible pump thrombosis who underwent heart transplant on 2/23/18 with a hepatitis C positive donor. His course was complicated by bilateral IJ/subclavian thrombi, a persistent small right sided pleural effusion, as well as acute on chronic renal failure of unclear etiology. In addition, his post-operative course was complicated by graft dysfunction of unclear etiology and persistent C4d positive staining on endomyocardial biopsy with negative DSAs. He developed joshua evidence of graft dysfunction leading to treatment with plasmapheresis, IVIG, and rituximab. Subsequently in June of 2018 he was found to have an pneumonia, that was ultimately diagnosed as Nocardia. This was successfully treated with therapy completed in August 2019.\par \par In the spring, he was admitted with hemolytic anemia of unclear etiology. There were no clear precipitating factors, such as infection. He was treated with prednisone and Rituximab. Given the potential for CNI inhibitors leading to hemolytic anemia, we transitioned him to everolimus and he remained on high dose prednisone. He was subsequently admitted with acute anemia (not hemolytic). He developed severe leukopenia and Klebsiella bacteremia. Following treatment of this, he developed a severe C. diff colitis infection leading to a prolonged recovery. He was weaned to lower dose prednisone and the everolimus was transitioned back to tacrolimus. At the time of discharge he was found to have low levels of CMV viremia and was started back on valganciclovir. \par \par Since discharge last week, he has been doing well. He has no abdominal complaints or diarrhea. He is dyspneic with walking, but is largely limited by his chronic knee osteoarthritis. He has no fevers, chills, sweats, dysuria, hematuria, or headaches.  \par \par He currently feels well. He notes ongoing mild pressure in his chest, which was worse when he was admitted initially with hemolytic anemia. He notes that he can walk without shortness of breath, but has ongoing limitations due to chronic right knee pain, which has not changed.  He has no PND or orthopnea and no cough. He denies fevers or chills. He has no abdominal pain, increase in abdominal girth or swelling in the legs. He has no diarrhea or rashes.

## 2022-09-07 ENCOUNTER — RX RENEWAL (OUTPATIENT)
Age: 72
End: 2022-09-07

## 2022-09-07 ENCOUNTER — OUTPATIENT (OUTPATIENT)
Dept: OUTPATIENT SERVICES | Facility: HOSPITAL | Age: 72
LOS: 1 days | End: 2022-09-07
Payer: MEDICARE

## 2022-09-07 ENCOUNTER — APPOINTMENT (OUTPATIENT)
Dept: PODIATRY | Facility: CLINIC | Age: 72
End: 2022-09-07

## 2022-09-07 VITALS
BODY MASS INDEX: 28.99 KG/M2 | HEART RATE: 89 BPM | OXYGEN SATURATION: 98 % | DIASTOLIC BLOOD PRESSURE: 89 MMHG | TEMPERATURE: 97 F | SYSTOLIC BLOOD PRESSURE: 138 MMHG | HEIGHT: 65 IN | RESPIRATION RATE: 18 BRPM | WEIGHT: 174 LBS

## 2022-09-07 DIAGNOSIS — M20.12 HALLUX VALGUS (ACQUIRED), LEFT FOOT: ICD-10-CM

## 2022-09-07 DIAGNOSIS — Z00.00 ENCOUNTER FOR GENERAL ADULT MEDICAL EXAMINATION WITHOUT ABNORMAL FINDINGS: ICD-10-CM

## 2022-09-07 DIAGNOSIS — Z94.1 HEART TRANSPLANT STATUS: Chronic | ICD-10-CM

## 2022-09-07 DIAGNOSIS — B35.1 TINEA UNGUIUM: ICD-10-CM

## 2022-09-07 DIAGNOSIS — M79.671 PAIN IN RIGHT FOOT: ICD-10-CM

## 2022-09-07 DIAGNOSIS — M79.672 PAIN IN LEFT FOOT: ICD-10-CM

## 2022-09-07 DIAGNOSIS — M20.11 HALLUX VALGUS (ACQUIRED), RIGHT FOOT: ICD-10-CM

## 2022-09-07 PROCEDURE — G0463: CPT

## 2022-09-07 PROCEDURE — 11721 DEBRIDE NAIL 6 OR MORE: CPT

## 2022-09-07 NOTE — ASSESSMENT
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

## 2022-09-07 NOTE — HISTORY OF PRESENT ILLNESS
[FreeTextEntry1] : 71 yo male pt presents to clinic for followup fungal nails to b/l feet. Pt states he has been applying ciclopriox for past few months and feels like he does not need it anymore. Pt endorses painful, thickened toenails that would like to be trimmed. Also states he has painful bunions to bilateral feet R>L. Denies any other pain or pedal complaints. Denies constitutional symptoms of N/V/C/F/Sob\par \par PMH: heart transplant (2018)\par

## 2022-09-09 ENCOUNTER — RESULT REVIEW (OUTPATIENT)
Age: 72
End: 2022-09-09

## 2022-09-09 ENCOUNTER — APPOINTMENT (OUTPATIENT)
Dept: HEMATOLOGY ONCOLOGY | Facility: CLINIC | Age: 72
End: 2022-09-09

## 2022-09-09 VITALS
OXYGEN SATURATION: 98 % | SYSTOLIC BLOOD PRESSURE: 133 MMHG | WEIGHT: 173.5 LBS | BODY MASS INDEX: 28.87 KG/M2 | HEART RATE: 86 BPM | DIASTOLIC BLOOD PRESSURE: 89 MMHG | TEMPERATURE: 97.2 F | RESPIRATION RATE: 18 BRPM

## 2022-09-09 LAB
BASOPHILS # BLD AUTO: 0.03 K/UL — SIGNIFICANT CHANGE UP (ref 0–0.2)
BASOPHILS NFR BLD AUTO: 0.3 % — SIGNIFICANT CHANGE UP (ref 0–2)
EOSINOPHIL # BLD AUTO: 0.13 K/UL — SIGNIFICANT CHANGE UP (ref 0–0.5)
EOSINOPHIL NFR BLD AUTO: 1.4 % — SIGNIFICANT CHANGE UP (ref 0–6)
HCT VFR BLD CALC: 39.3 % — SIGNIFICANT CHANGE UP (ref 39–50)
HGB BLD-MCNC: 12.1 G/DL — LOW (ref 13–17)
IMM GRANULOCYTES NFR BLD AUTO: 0.6 % — SIGNIFICANT CHANGE UP (ref 0–1.5)
LYMPHOCYTES # BLD AUTO: 1.79 K/UL — SIGNIFICANT CHANGE UP (ref 1–3.3)
LYMPHOCYTES # BLD AUTO: 18.8 % — SIGNIFICANT CHANGE UP (ref 13–44)
MCHC RBC-ENTMCNC: 29.7 PG — SIGNIFICANT CHANGE UP (ref 27–34)
MCHC RBC-ENTMCNC: 30.8 G/DL — LOW (ref 32–36)
MCV RBC AUTO: 96.3 FL — SIGNIFICANT CHANGE UP (ref 80–100)
MONOCYTES # BLD AUTO: 0.89 K/UL — SIGNIFICANT CHANGE UP (ref 0–0.9)
MONOCYTES NFR BLD AUTO: 9.3 % — SIGNIFICANT CHANGE UP (ref 2–14)
NEUTROPHILS # BLD AUTO: 6.64 K/UL — SIGNIFICANT CHANGE UP (ref 1.8–7.4)
NEUTROPHILS NFR BLD AUTO: 69.6 % — SIGNIFICANT CHANGE UP (ref 43–77)
NRBC # BLD: 0 /100 WBCS — SIGNIFICANT CHANGE UP (ref 0–0)
PLATELET # BLD AUTO: 96 K/UL — LOW (ref 150–400)
RBC # BLD: 4.08 M/UL — LOW (ref 4.2–5.8)
RBC # FLD: 15.1 % — HIGH (ref 10.3–14.5)
WBC # BLD: 9.54 K/UL — SIGNIFICANT CHANGE UP (ref 3.8–10.5)
WBC # FLD AUTO: 9.54 K/UL — SIGNIFICANT CHANGE UP (ref 3.8–10.5)

## 2022-09-09 PROCEDURE — 99214 OFFICE O/P EST MOD 30 MIN: CPT

## 2022-09-10 LAB
RETICS #: 112.5 K/UL — SIGNIFICANT CHANGE UP (ref 25–125)
RETICS/RBC NFR: 2.8 % — HIGH (ref 0.5–2.5)
TACROLIMUS SERPL-MCNC: 9.6 NG/ML

## 2022-09-20 ENCOUNTER — NON-APPOINTMENT (OUTPATIENT)
Age: 72
End: 2022-09-20

## 2022-09-20 ENCOUNTER — APPOINTMENT (OUTPATIENT)
Dept: HEART FAILURE | Facility: CLINIC | Age: 72
End: 2022-09-20

## 2022-09-20 VITALS — DIASTOLIC BLOOD PRESSURE: 84 MMHG | SYSTOLIC BLOOD PRESSURE: 127 MMHG

## 2022-09-20 VITALS
DIASTOLIC BLOOD PRESSURE: 94 MMHG | WEIGHT: 174 LBS | OXYGEN SATURATION: 99 % | BODY MASS INDEX: 28.99 KG/M2 | SYSTOLIC BLOOD PRESSURE: 156 MMHG | TEMPERATURE: 97.4 F | HEIGHT: 65 IN | HEART RATE: 100 BPM

## 2022-09-20 VITALS — SYSTOLIC BLOOD PRESSURE: 124 MMHG | DIASTOLIC BLOOD PRESSURE: 86 MMHG

## 2022-09-20 PROCEDURE — 93000 ELECTROCARDIOGRAM COMPLETE: CPT

## 2022-09-20 PROCEDURE — 99214 OFFICE O/P EST MOD 30 MIN: CPT | Mod: 25

## 2022-09-20 NOTE — DISCUSSION/SUMMARY
[FreeTextEntry1] : 4.5  yrs post transplant \par Issues:\par LAD/RV fistula with coronary dilation not amenable to intervention.\par  Intial LV dyfunction initiated on GDMT\par hemolytic anemia/recent thrombocytopenia. Seen by Dr Rosario last month. Appreciate thoughtful input. To remain on pred 5 minimum.  \par intolerant of cellcept due leukopenia. 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 everolimus d/c for that reason. \par No transplant coronary disease. Renal function stable 1.4. \par  last Bx in Feb 2022 0R no cellular rejection. C4D 50%. +1 staining. Allosure < 0.12 No histopath features of AMR. DSA Feb 2022 none\par Feb 2022 annual R/L : hemos normal. some further dilation in LAD. \par Followed by Dr sparks[facility] endocrine. Dexa July 2022 showed progression of osteoperosis. Initiated on prolia Q 6mths.   \par Feeling well. Unlimited in walkking \par meds: toprol 200, losartan 100 QD, prograf 4/4 bid ( 7.3, target 6-8.), pred 5  asa, vit D, folic PPI, prava 20. valtessa QD. \par 2 X JJ, last April 2021\par 100, 127/84, afeb, 174\par TTE July 2022: normal LV function 60, LVEDD 4.5, RVnormal size decreased function. no valvular sissues. \par recent labs; WBC 9.5, Hb 12, Plt 96. prograf 9.6 \par Na 141, K 5.0, G 87, BUN 39, Cr 1.7 (1.6, .1.6) \par Imp: \par normalized LV function on GDMT. \par Elevated Cr with prograf level >9\par Plan: \par decrease prograf 3/3\par check DSAs and Allosure. \par See 3 mths. \par Re Bivalent vaccine. Patient has AMR. However did not make DSA to J an J vax. Patient interested in bivalent vaxine which he can receive. Patient should get everoshield. Follow DSAs one month after vax. \par \par Marvin Campbell

## 2022-09-20 NOTE — PHYSICAL EXAM
[Well Developed] : well developed [Well Nourished] : well nourished [Normal] : normal S1, S2, no murmur, no rub, no gallop [Normal S1, S2] : normal S1, S2 [de-identified] : no jvd [de-identified] : +III/VI diastolic murmur, tachycardic

## 2022-09-20 NOTE — HISTORY OF PRESENT ILLNESS
[FreeTextEntry1] : Mr. Baez is a 71 year old man with past medical history of a nonischemic cardiomyopathy and chronic systolic heart failure s/p HM2 LVAD in June 2017 complicated by recurrent GI hemorrhage and possible pump thrombosis who underwent heart transplant on 2/23/18 with a hepatitis C positive donor. His course was complicated by bilateral IJ/subclavian thrombi, a persistent small right sided pleural effusion, as well as acute on chronic renal failure of unclear etiology. In addition, his post-operative course was complicated by graft dysfunction of unclear etiology and persistent C4d positive staining on endomyocardial biopsy with negative DSAs. He developed joshua evidence of graft dysfunction leading to treatment with plasmapheresis, IVIG, and rituximab. Subsequently in June of 2018 he was found to have an pneumonia, that was ultimately diagnosed as Nocardia. This was successfully treated with therapy completed in August 2019.\par \par In the spring 2020, he was admitted with hemolytic anemia of unclear etiology. There were no clear precipitating factors, such as infection. He was treated with prednisone and Rituximab. Given the potential for CNI inhibitors leading to hemolytic anemia, we transitioned him to everolimus and he remained on high dose prednisone. He was subsequently admitted with acute anemia (not hemolytic). He developed severe leukopenia and Klebsiella bacteremia. Following treatment of this, he developed a severe C. diff colitis infection leading to a prolonged recovery. He was weaned to lower dose prednisone and the everolimus was transitioned back to tacrolimus. At the time of discharge he was found to have low levels of CMV viremia and was started back on valganciclovir. \par \par 4th annual R/LHC and biopsy (2/28/2022) showed normal hemodynamics EMBX ISHLT Grade 0R, IF C4D 50% +1 staining, and larger LAD.\par \par Pt here today for routine follow up clinic visit. Since last seen he reports doing ok. 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 His platelets went from 55-->95. He continues to walks 5-6 blocks with a cane, limited by b/l knee pain.  [facility] and eats 2 meals/daily there. Still reports sometimes still feels "abdominal tightness" and b/l shoulder discomfort, but not worsening. He has no abdominal complaints or diarrhea. Has normal bowel movements.  He has no PND, orthopnea, cough, or swelling in the legs.  He has no fevers, chills, sweats, dysuria, hematuria, or headaches.  Appetite good, and sleeping well. Received covid booster and +Covid PCR in january, he was asymptomatic, no treatment.\par \par Health maintenance:\par DEXA 5/2019, 6/2021: osteopenia, 6/2022: worsening osteopenia. Followed by Dr. Clemens.\par CT Colon (4/2017) IMPRESSION: No colonic polyp or mass.\par PSA 6.76 ( 4/29/2022)\par HgA1c 5.3% (1/26/22)

## 2022-09-20 NOTE — CARDIOLOGY SUMMARY
[de-identified] : DSE 12/22/2020: Conclusions:\par 1. Normal hemodynamic response.\par 2. Normal electrocardiographic response.\par 3. Overall preserved left ventricular systolic function\par with mild hypokinesis of the mid to distal anterior wall at\par baseline. Normal augmentation in left ventricular systolic\par function with dobutamine infusion.\par 4. No evidence of inducible ischemia on stress\par echocardiogram images.\par *** Compared with echocardiogram of 12/2/2020, overall\par preserved left ventricular systolic function at baseline\par with mild hypokiesis of the mid to distal anterior wall.\par Normal augmentation in left ventricular systolic function\par with dobutamine infusion.\par \par 2/20/20: DSE EF 50-55% no evidence of inducible ischemia on pharmacologic stress echo images [de-identified] : \par TTE 7/25/22: EF 60%, LVIDD 4.5cm, normal LV function, normal RV size with decreased RV function, mild TR, no pericardial effusion\par \par TTE 2/28/22: EF 65% LVIDD not calculated. normal biV function. 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 minimal TR. no pericardial effusion. 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  No additional coronary-cameral fistulae noted. 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 IVUS performed of the RCA\par which was normal. Normal RHC\par \par 12/2/2020: RHC/Biopsy ISHLT Grade 0R\par 11/18/2020:  L/RHC w/ IVUS:\par CORONARY VESSELS: The coronary circulation is right dominant.\par LM:   --  LM: The vessel was very large sized. Angiography showed\par aneurysmal dilatation.\par LAD:   --  Proximal LAD: The vessel was very large sized. Angiography\par showed aneurysmal dilatation.\par --  Mid LAD: The vessel was very large sized and excessively ectatic. A\par fistula to the left ventricle was identified.\par --  Distal LAD: Normal. 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 No significant intimal thickening/hyperplasia or\par plaque was observed.

## 2022-09-28 ENCOUNTER — APPOINTMENT (OUTPATIENT)
Dept: HEART FAILURE | Facility: CLINIC | Age: 72
End: 2022-09-28

## 2022-10-10 ENCOUNTER — OUTPATIENT (OUTPATIENT)
Dept: OUTPATIENT SERVICES | Facility: HOSPITAL | Age: 72
LOS: 1 days | Discharge: HOME | End: 2022-10-10

## 2022-10-10 DIAGNOSIS — D64.9 ANEMIA, UNSPECIFIED: ICD-10-CM

## 2022-10-10 DIAGNOSIS — Z94.1 HEART TRANSPLANT STATUS: Chronic | ICD-10-CM

## 2022-10-11 ENCOUNTER — RX RENEWAL (OUTPATIENT)
Age: 72
End: 2022-10-11

## 2022-10-11 ENCOUNTER — RESULT REVIEW (OUTPATIENT)
Age: 72
End: 2022-10-11

## 2022-10-11 ENCOUNTER — APPOINTMENT (OUTPATIENT)
Dept: HEMATOLOGY ONCOLOGY | Facility: CLINIC | Age: 72
End: 2022-10-11

## 2022-10-11 VITALS
SYSTOLIC BLOOD PRESSURE: 124 MMHG | HEIGHT: 65 IN | WEIGHT: 175.25 LBS | OXYGEN SATURATION: 98 % | BODY MASS INDEX: 29.2 KG/M2 | DIASTOLIC BLOOD PRESSURE: 79 MMHG | TEMPERATURE: 97.1 F | RESPIRATION RATE: 16 BRPM | HEART RATE: 93 BPM

## 2022-10-11 LAB
BASOPHILS # BLD AUTO: 0.02 K/UL — SIGNIFICANT CHANGE UP (ref 0–0.2)
BASOPHILS NFR BLD AUTO: 0.2 % — SIGNIFICANT CHANGE UP (ref 0–2)
EOSINOPHIL # BLD AUTO: 0.1 K/UL — SIGNIFICANT CHANGE UP (ref 0–0.5)
EOSINOPHIL NFR BLD AUTO: 1.2 % — SIGNIFICANT CHANGE UP (ref 0–6)
HCT VFR BLD CALC: 37.5 % — LOW (ref 39–50)
HGB BLD-MCNC: 11.4 G/DL — LOW (ref 13–17)
IMM GRANULOCYTES NFR BLD AUTO: 0.6 % — SIGNIFICANT CHANGE UP (ref 0–0.9)
LYMPHOCYTES # BLD AUTO: 1.65 K/UL — SIGNIFICANT CHANGE UP (ref 1–3.3)
LYMPHOCYTES # BLD AUTO: 20.4 % — SIGNIFICANT CHANGE UP (ref 13–44)
MCHC RBC-ENTMCNC: 29.4 PG — SIGNIFICANT CHANGE UP (ref 27–34)
MCHC RBC-ENTMCNC: 30.4 G/DL — LOW (ref 32–36)
MCV RBC AUTO: 96.6 FL — SIGNIFICANT CHANGE UP (ref 80–100)
MONOCYTES # BLD AUTO: 0.91 K/UL — HIGH (ref 0–0.9)
MONOCYTES NFR BLD AUTO: 11.3 % — SIGNIFICANT CHANGE UP (ref 2–14)
NEUTROPHILS # BLD AUTO: 5.34 K/UL — SIGNIFICANT CHANGE UP (ref 1.8–7.4)
NEUTROPHILS NFR BLD AUTO: 66.3 % — SIGNIFICANT CHANGE UP (ref 43–77)
NRBC # BLD: 0 /100 WBCS — SIGNIFICANT CHANGE UP (ref 0–0)
PLATELET # BLD AUTO: 103 K/UL — LOW (ref 150–400)
RBC # BLD: 3.88 M/UL — LOW (ref 4.2–5.8)
RBC # FLD: 15.6 % — HIGH (ref 10.3–14.5)
RETICS #: 100.1 K/UL — SIGNIFICANT CHANGE UP (ref 25–125)
RETICS/RBC NFR: 2.6 % — HIGH (ref 0.5–2.5)
WBC # BLD: 8.07 K/UL — SIGNIFICANT CHANGE UP (ref 3.8–10.5)
WBC # FLD AUTO: 8.07 K/UL — SIGNIFICANT CHANGE UP (ref 3.8–10.5)

## 2022-10-11 PROCEDURE — 99213 OFFICE O/P EST LOW 20 MIN: CPT

## 2022-10-12 NOTE — PROGRESS NOTE ADULT - PROBLEM SELECTOR PLAN 5
Impression: Presbyopia: H52.4. Plan: Discussed diagnosis in detail with patient. New SRx was given today. Antibiotics were discontinued as cultures have been negative.   Possibly related to right hand swelling. Would have rheum evaluate.

## 2022-10-13 LAB
ALBUMIN SERPL ELPH-MCNC: 4 G/DL
ALP BLD-CCNC: 68 U/L
ALT SERPL-CCNC: 9 U/L
ANION GAP SERPL CALC-SCNC: 12 MMOL/L
AST SERPL-CCNC: 13 U/L
BASOPHILS # BLD AUTO: 0.02 K/UL
BASOPHILS NFR BLD AUTO: 0.2 %
BILIRUB SERPL-MCNC: 0.6 MG/DL
BUN SERPL-MCNC: 33 MG/DL
CALCIUM SERPL-MCNC: 9.2 MG/DL
CHLORIDE SERPL-SCNC: 106 MMOL/L
CMV DNA SPEC QL NAA+PROBE: NOT DETECTED IU/ML
CMVPCR LOG: NOT DETECTED LOG10IU/ML
CO2 SERPL-SCNC: 22 MMOL/L
CREAT SERPL-MCNC: 1.5 MG/DL
EGFR: 49 ML/MIN/1.73M2
EOSINOPHIL # BLD AUTO: 0.09 K/UL
EOSINOPHIL NFR BLD AUTO: 1 %
GLUCOSE SERPL-MCNC: 97 MG/DL
HCT VFR BLD CALC: 36.5 %
HGB BLD-MCNC: 11.2 G/DL
IMM GRANULOCYTES NFR BLD AUTO: 0.7 %
LYMPHOCYTES # BLD AUTO: 2.07 K/UL
LYMPHOCYTES NFR BLD AUTO: 22.6 %
MAGNESIUM SERPL-MCNC: 1.7 MG/DL
MAN DIFF?: NORMAL
MCHC RBC-ENTMCNC: 29.1 PG
MCHC RBC-ENTMCNC: 30.7 GM/DL
MCV RBC AUTO: 94.8 FL
MONOCYTES # BLD AUTO: 1.02 K/UL
MONOCYTES NFR BLD AUTO: 11.2 %
NEUTROPHILS # BLD AUTO: 5.88 K/UL
NEUTROPHILS NFR BLD AUTO: 64.3 %
PLATELET # BLD AUTO: 97 K/UL
POTASSIUM SERPL-SCNC: 4.3 MMOL/L
PROT SERPL-MCNC: 6.9 G/DL
RBC # BLD: 3.85 M/UL
RBC # FLD: 15.9 %
SODIUM SERPL-SCNC: 140 MMOL/L
TACROLIMUS SERPL-MCNC: 6.9 NG/ML
WBC # FLD AUTO: 9.14 K/UL

## 2022-10-13 NOTE — H&P ADULT - PROBLEM SELECTOR PLAN 2
Plan: Refer to Oral Surgeon for evaluation. Detail Level: Detailed CT chest  ID consulted  Sputum C/S  antibiotics as per ID CT chest> right infiltrate ? PNA  ID consulted  Sputum C/S  antibiotics as per ID

## 2022-10-17 NOTE — PHYSICAL EXAM
[Fully active, able to carry on all pre-disease performance without restriction] : Status 0 - Fully active, able to carry on all pre-disease performance without restriction [Normal] : affect appropriate [de-identified] : RRR. S1S2 normal. Gr 2/6 holosystolic murmur LLSB. No gallops.

## 2022-10-17 NOTE — RESULTS/DATA
[FreeTextEntry1] : WBC 9540 Hgb 12.1 Hct 39.3 MCV 96.3 Platelets 96,000 Diff normal\par \par 8/23/22\par CMP BUN 39, Creatinine 1.73, eGFR 41\par MG 1.8\par Cytomegalovirus negative\par Tacrolimus 10\par \par \par \par

## 2022-10-17 NOTE — CONSULT LETTER
[Dear  ___] : Dear  [unfilled], [Courtesy Letter:] : I had the pleasure of seeing your patient, [unfilled], in my office today. [Please see my note below.] : Please see my note below. [Sincerely,] : Sincerely, [DrEduar  ___] : Dr. HAN [FreeTextEntry2] : Dr. Lisa Ac [FreeTextEntry3] : Tao Rosario M.D., FACP\par Professor of Medicine\par [facility] [employer]\par Associate Chief, Division of Hematology\par [facility]\par [facility]\par [address] [place] [address]\[place] [address]\par [phone]\par \par \par \par

## 2022-10-17 NOTE — ADDENDUM
[FreeTextEntry1] : I, Darwin Diop, acted solely as a scribe for Dr. Tao Rosario on 09/09/2022. All medical entries made by the Scribe were at my, Dr. Tao Rosario's, direction and personally dictated by me on 09/09/2022. I have reviewed the chart and agree that the record accurately reflects my personal performance of the history, physical exam, assessment and plan. I have also personally directed, reviewed, and agreed with the chart.

## 2022-10-17 NOTE — HISTORY OF PRESENT ILLNESS
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 Chronic left knee pain stable. He has been more active recently. No other c/o. No chest pain, SOB, abdominal pain, jaundice, dark urine, melena, BRBPR, fever, night sweats, swollen glands, arthritis, rash, limb pain/swelling. Weight stable. Taking Prednisone 5 mg daily. normal...

## 2022-10-17 NOTE — ASSESSMENT
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 Was in remission with well compensated hemolytic anemia on low dose prednisone, but relapsed after tapered to 3 mg daily. 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 Is responding very well to minimal dose increase to 5 mg daily with rising hgb and platelet count.\par \par Plan:\par Prednisone 5 mg daily -- would not taper further\par Tacrolimus/ ASA per Cardiology. hold ASA if platelets < 50,000 and Cardiology concurs.\par Folic acid\par To ER prn fever\par RTC in 1 month\par

## 2022-10-19 NOTE — PATIENT PROFILE ADULT. - NS SC CAGE ALCOHOL GUILTY ABOUT
Post-op Day 1 Anesthesia Assessment    Patient: Darshana McarthurHedyEmily  Procedure(s) Performed: LEFT TOTAL HIP ARTHROPLASTY - LEFT  Anesthesia type: Spinal    Vitals Value Taken Time   Temp 36.4 °C (97.5 °F) 10/19/22 0741   Pulse 77 10/19/22 0741   Resp 18 10/19/22 0741   SpO2 100 % 10/19/22 0741   /88 10/19/22 0741         Patient Location: PACU Phase 1  Post-op Vital Signs:stable  Level of Consciousness: awake and alert  Respiratory Status: spontaneous ventilation  Cardiovascular stable  Hydration: euvolemic  Pain Management: adequately controlled  Handoff: Handoff to receiving clinician was performed and questions were answered  Vomiting: none  Nausea: None  Airway Patency:patent  Post-op Assessment: no complications and patient tolerated procedure well with no complications      No complications documented.    no

## 2022-11-03 ENCOUNTER — RX RENEWAL (OUTPATIENT)
Age: 72
End: 2022-11-03

## 2022-11-04 NOTE — ASSESSMENT
[Palliative Care Plan] : not applicable at this time Click to add… [FreeTextEntry1] : 69 YO M S/P cardiac transplant developed mixed type autoimmune hemolytic anemia while on immunosuppression. Appears to be in remission on low dose prednisone. Has mild leukopenia today.\par \par Plan:\par Prednisone 5 mg daily\par Tacrolimus/ ASA per Cardiology\par Discuss immunosuppression with Cardiology - ? continue steroids or switch to Cellcept\par Discuss adding Folic acid 1 mg daily\par To ER prn fever\par RTC 1 month\par

## 2022-11-10 ENCOUNTER — RESULT REVIEW (OUTPATIENT)
Age: 72
End: 2022-11-10

## 2022-11-10 ENCOUNTER — APPOINTMENT (OUTPATIENT)
Dept: HEMATOLOGY ONCOLOGY | Facility: CLINIC | Age: 72
End: 2022-11-10

## 2022-11-10 VITALS
WEIGHT: 177.03 LBS | OXYGEN SATURATION: 98 % | SYSTOLIC BLOOD PRESSURE: 142 MMHG | BODY MASS INDEX: 29.46 KG/M2 | RESPIRATION RATE: 16 BRPM | DIASTOLIC BLOOD PRESSURE: 89 MMHG | TEMPERATURE: 97.2 F | HEART RATE: 101 BPM

## 2022-11-10 LAB
BASOPHILS # BLD AUTO: 0.02 K/UL — SIGNIFICANT CHANGE UP (ref 0–0.2)
BASOPHILS NFR BLD AUTO: 0.2 % — SIGNIFICANT CHANGE UP (ref 0–2)
EOSINOPHIL # BLD AUTO: 0.09 K/UL — SIGNIFICANT CHANGE UP (ref 0–0.5)
EOSINOPHIL NFR BLD AUTO: 1.1 % — SIGNIFICANT CHANGE UP (ref 0–6)
HCT VFR BLD CALC: 38.8 % — LOW (ref 39–50)
HGB BLD-MCNC: 11.8 G/DL — LOW (ref 13–17)
IMM GRANULOCYTES NFR BLD AUTO: 0.4 % — SIGNIFICANT CHANGE UP (ref 0–0.9)
LYMPHOCYTES # BLD AUTO: 1.63 K/UL — SIGNIFICANT CHANGE UP (ref 1–3.3)
LYMPHOCYTES # BLD AUTO: 20 % — SIGNIFICANT CHANGE UP (ref 13–44)
MCHC RBC-ENTMCNC: 29.1 PG — SIGNIFICANT CHANGE UP (ref 27–34)
MCHC RBC-ENTMCNC: 30.4 G/DL — LOW (ref 32–36)
MCV RBC AUTO: 95.6 FL — SIGNIFICANT CHANGE UP (ref 80–100)
MONOCYTES # BLD AUTO: 0.92 K/UL — HIGH (ref 0–0.9)
MONOCYTES NFR BLD AUTO: 11.3 % — SIGNIFICANT CHANGE UP (ref 2–14)
NEUTROPHILS # BLD AUTO: 5.48 K/UL — SIGNIFICANT CHANGE UP (ref 1.8–7.4)
NEUTROPHILS NFR BLD AUTO: 67 % — SIGNIFICANT CHANGE UP (ref 43–77)
NRBC # BLD: 0 /100 WBCS — SIGNIFICANT CHANGE UP (ref 0–0)
PLATELET # BLD AUTO: 56 K/UL — LOW (ref 150–400)
RBC # BLD: 4.06 M/UL — LOW (ref 4.2–5.8)
RBC # FLD: 15.7 % — HIGH (ref 10.3–14.5)
RETICS #: 83.5 K/UL — SIGNIFICANT CHANGE UP (ref 25–125)
RETICS/RBC NFR: 2.1 % — SIGNIFICANT CHANGE UP (ref 0.5–2.5)
WBC # BLD: 8.17 K/UL — SIGNIFICANT CHANGE UP (ref 3.8–10.5)
WBC # FLD AUTO: 8.17 K/UL — SIGNIFICANT CHANGE UP (ref 3.8–10.5)

## 2022-11-10 PROCEDURE — 99213 OFFICE O/P EST LOW 20 MIN: CPT

## 2022-11-10 NOTE — ASSESSMENT
[FreeTextEntry1] : 71 YO M S/P cardiac transplant developed mixed type autoimmune hemolytic anemia while on immunosuppression. Was in remission with well compensated hemolytic anemia on low dose prednisone, but relapsed after tapered to 3 mg daily. 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 Is responding very well to minimal dose increase to 5 mg daily with rising hgb and platelet count.\par \par Plan:\par Prednisone 5 mg daily -- would not taper further\par Tacrolimus/ ASA per Cardiology. hold ASA if platelets < 50,000 and Cardiology concurs.\par Folic acid\par To ER prn fever\par RTC in 1 month\par  [Palliative Care Plan] : not applicable at this time Fusiform Excision Additional Text (Leave Blank If You Do Not Want): The margin was drawn around the clinically apparent lesion.  A fusiform shape was then drawn on the skin incorporating the lesion and margins.  Incisions were then made along these lines to the appropriate tissue plane and the lesion was extirpated.

## 2022-11-10 NOTE — CONSULT LETTER
[Dear  ___] : Dear  [unfilled], [Courtesy Letter:] : I had the pleasure of seeing your patient, [unfilled], in my office today. [Please see my note below.] : Please see my note below. [Sincerely,] : Sincerely, [FreeTextEntry2] : Dr. Lisa Ac [FreeTextEntry3] : Tao Rosario M.D., FACP\par Professor of Medicine\par [facility] [employer]\par Associate Chief, Division of Hematology\par [facility]\par [facility]\par [address] [place] [address]\[place] [address]\par [phone]\par \par \par \par   [DrEduar  ___] : Dr. HAN

## 2022-11-10 NOTE — HISTORY OF PRESENT ILLNESS
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 Chronic left knee pain stable. He has been more active recently. No other c/o. No chest pain, SOB, abdominal pain, jaundice, dark urine, melena, BRBPR, fever, night sweats, swollen glands, arthritis, rash, limb pain/swelling. Weight stable. Taking Prednisone 5 mg daily.

## 2022-11-10 NOTE — PHYSICAL EXAM
[Fully active, able to carry on all pre-disease performance without restriction] : Status 0 - Fully active, able to carry on all pre-disease performance without restriction [Normal] : affect appropriate [de-identified] : RRR. S1S2 normal. Gr 2/6 holosystolic murmur LLSB. No gallops.

## 2022-11-15 NOTE — ASSESSMENT
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 Was in remission with well compensated hemolytic anemia on low dose prednisone, but relapsed after tapered to 3 mg daily. 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 Is responding very well to minimal dose increase to 5 mg daily with rising hgb.  Platelet count decreased to 56K today, will monitor.\par \par Plan:\par Prednisone 5 mg daily -- would not taper further\par Tacrolimus/ ASA per Cardiology. hold ASA if platelets < 50,000 and Cardiology concurs.\par Folic acid\par To ER prn fever\par RTC in 1 month\par

## 2022-11-15 NOTE — PHYSICAL EXAM
[Fully active, able to carry on all pre-disease performance without restriction] : Status 0 - Fully active, able to carry on all pre-disease performance without restriction [Normal] : affect appropriate [de-identified] : RRR. S1S2 normal. Gr 2/6 holosystolic murmur LLSB. No gallops.

## 2022-11-15 NOTE — CONSULT LETTER
[Dear  ___] : Dear  [unfilled], [Courtesy Letter:] : I had the pleasure of seeing your patient, [unfilled], in my office today. [Please see my note below.] : Please see my note below. [Sincerely,] : Sincerely, [DrEduar  ___] : Dr. HAN [FreeTextEntry2] : Dr. Lisa Ac [FreeTextEntry3] : Tao Rosario M.D., FACP\par Professor of Medicine\par [facility] [employer]\par Associate Chief, Division of Hematology\par [facility]\par [facility]\par [address] [place] [address]\[place] [address]\par [phone]\par \par \par \par

## 2022-11-15 NOTE — HISTORY OF PRESENT ILLNESS
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 Chronic left knee pain stable, improves with walking. He has been more active recently.  Works on odd jobs outdoors. No other c/o. No chest pain, SOB, abdominal pain, jaundice, dark urine, melena, BRBPR, fever, night sweats, swollen glands, arthritis, rash, limb pain/swelling. Weight stable. Taking Prednisone 5 mg daily.

## 2022-12-07 ENCOUNTER — APPOINTMENT (OUTPATIENT)
Dept: PODIATRY | Facility: CLINIC | Age: 72
End: 2022-12-07

## 2022-12-07 ENCOUNTER — OUTPATIENT (OUTPATIENT)
Dept: OUTPATIENT SERVICES | Facility: HOSPITAL | Age: 72
LOS: 1 days | End: 2022-12-07
Payer: MEDICARE

## 2022-12-07 VITALS
TEMPERATURE: 97.3 F | HEART RATE: 98 BPM | SYSTOLIC BLOOD PRESSURE: 152 MMHG | WEIGHT: 177 LBS | OXYGEN SATURATION: 97 % | DIASTOLIC BLOOD PRESSURE: 90 MMHG | BODY MASS INDEX: 29.49 KG/M2 | HEIGHT: 65 IN

## 2022-12-07 DIAGNOSIS — Z00.00 ENCOUNTER FOR GENERAL ADULT MEDICAL EXAMINATION WITHOUT ABNORMAL FINDINGS: ICD-10-CM

## 2022-12-07 PROCEDURE — G0463: CPT

## 2022-12-07 NOTE — ASSESSMENT
[FreeTextEntry1] : PE\par Vasc: DP/PT palpable pulses, CFT brisk to all digits, TG wnl, no edema, no erythema noted\par Derm: Dystrophic, elongated toenails b/l, dried and scaly skin, no open lesions noted, no open wounds, no clinical signs of infection. Hyperkeratotic lesion noted to right foot, submet 5\par Neuro: Protective sensation grossly intact b/l\par Ortho: severe HAV b/l, 2nd and 3rd right digit hammertoes, 2nd left digit hammertoe \par \par A:\par Onychomycosis\par B/l HAV \par Hammertoes 2nd, 3rd digit R; 2nd L \par foot pain\par \par \par P:\par Pt seen and evaluated\par Discussed all clinical findings with the pt\par Aseptic trimming of the elongated toenails b/l using the sterile nail nipper\par Debridement of hyperkeratotic lesion down to and including skin using sterile #15 blade. Patient tolerated procedures well\par Advised pt to keep his feet clean and check them daily \par Recommend to continue shoe-gear with wide toebox\par Continue with conservative treatments for bunions and hammertoes\par Advised pt to come back sooner if problems occur \par RTC 3 months for routine check up \par \par

## 2022-12-07 NOTE — HISTORY OF PRESENT ILLNESS
[FreeTextEntry1] : 71 yo male pt presents to clinic for followup fungal nails to b/l feet. Patient states that he is no longer using ciclopirox. Pt endorses painful, thickened toenails that would like to be trimmed. Also states he has painful bunions to bilateral feet R>L. Denies any other pain or pedal complaints. Denies constitutional symptoms of N/V/C/F/Sob\par \par PMH: heart transplant (2018)\par

## 2022-12-08 ENCOUNTER — APPOINTMENT (OUTPATIENT)
Dept: HEMATOLOGY ONCOLOGY | Facility: CLINIC | Age: 72
End: 2022-12-08

## 2022-12-08 ENCOUNTER — RESULT REVIEW (OUTPATIENT)
Age: 72
End: 2022-12-08

## 2022-12-08 VITALS
WEIGHT: 179.21 LBS | RESPIRATION RATE: 16 BRPM | TEMPERATURE: 96.9 F | SYSTOLIC BLOOD PRESSURE: 117 MMHG | BODY MASS INDEX: 29.83 KG/M2 | OXYGEN SATURATION: 99 % | HEART RATE: 94 BPM | DIASTOLIC BLOOD PRESSURE: 77 MMHG

## 2022-12-08 DIAGNOSIS — B35.1 TINEA UNGUIUM: ICD-10-CM

## 2022-12-08 DIAGNOSIS — M79.671 PAIN IN RIGHT FOOT: ICD-10-CM

## 2022-12-08 DIAGNOSIS — M79.672 PAIN IN LEFT FOOT: ICD-10-CM

## 2022-12-08 DIAGNOSIS — M21.611 BUNION OF RIGHT FOOT: ICD-10-CM

## 2022-12-08 DIAGNOSIS — M21.612 BUNION OF LEFT FOOT: ICD-10-CM

## 2022-12-08 LAB
BASOPHILS # BLD AUTO: 0.02 K/UL — SIGNIFICANT CHANGE UP (ref 0–0.2)
BASOPHILS NFR BLD AUTO: 0.3 % — SIGNIFICANT CHANGE UP (ref 0–2)
EOSINOPHIL # BLD AUTO: 0.06 K/UL — SIGNIFICANT CHANGE UP (ref 0–0.5)
EOSINOPHIL NFR BLD AUTO: 0.8 % — SIGNIFICANT CHANGE UP (ref 0–6)
HCT VFR BLD CALC: 38.1 % — LOW (ref 39–50)
HGB BLD-MCNC: 11.5 G/DL — LOW (ref 13–17)
IMM GRANULOCYTES NFR BLD AUTO: 1.8 % — HIGH (ref 0–0.9)
LYMPHOCYTES # BLD AUTO: 1.39 K/UL — SIGNIFICANT CHANGE UP (ref 1–3.3)
LYMPHOCYTES # BLD AUTO: 19.2 % — SIGNIFICANT CHANGE UP (ref 13–44)
MCHC RBC-ENTMCNC: 29 PG — SIGNIFICANT CHANGE UP (ref 27–34)
MCHC RBC-ENTMCNC: 30.2 G/DL — LOW (ref 32–36)
MCV RBC AUTO: 96 FL — SIGNIFICANT CHANGE UP (ref 80–100)
MONOCYTES # BLD AUTO: 0.93 K/UL — HIGH (ref 0–0.9)
MONOCYTES NFR BLD AUTO: 12.8 % — SIGNIFICANT CHANGE UP (ref 2–14)
NEUTROPHILS # BLD AUTO: 4.72 K/UL — SIGNIFICANT CHANGE UP (ref 1.8–7.4)
NEUTROPHILS NFR BLD AUTO: 65.1 % — SIGNIFICANT CHANGE UP (ref 43–77)
NRBC # BLD: 0 /100 WBCS — SIGNIFICANT CHANGE UP (ref 0–0)
PLATELET # BLD AUTO: 118 K/UL — LOW (ref 150–400)
RBC # BLD: 3.97 M/UL — LOW (ref 4.2–5.8)
RBC # FLD: 16.8 % — HIGH (ref 10.3–14.5)
RETICS #: 132.2 K/UL — HIGH (ref 25–125)
RETICS/RBC NFR: 3.3 % — HIGH (ref 0.5–2.5)
WBC # BLD: 7.25 K/UL — SIGNIFICANT CHANGE UP (ref 3.8–10.5)
WBC # FLD AUTO: 7.25 K/UL — SIGNIFICANT CHANGE UP (ref 3.8–10.5)

## 2022-12-08 PROCEDURE — 99213 OFFICE O/P EST LOW 20 MIN: CPT

## 2022-12-09 NOTE — CONSULT LETTER
[Dear  ___] : Dear  [unfilled], [Courtesy Letter:] : I had the pleasure of seeing your patient, [unfilled], in my office today. [Please see my note below.] : Please see my note below. [Sincerely,] : Sincerely, [DrEduar  ___] : Dr. HAN [FreeTextEntry2] : Dr. Lisa Ac [FreeTextEntry3] : Tao Rosario M.D., FACP\par Professor of Medicine\par [facility] [employer]\par Associate Chief, Division of Hematology\par [facility]\par [facility]\par [address] [place] [address]\[place] [address]\par [phone]\par \par \par \par

## 2022-12-09 NOTE — ASSESSMENT
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 Was in remission with well compensated hemolytic anemia on low dose prednisone, but relapsed after tapered to 3 mg daily. 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 Is responding very well to minimal dose increase to 5 mg daily with rising hgb.  Platelet count had been decreased to 56K at last visit, now 118.\par \par Plan:\par Continue Prednisone 5 mg daily -- would not taper further\par Tacrolimus/ ASA per Cardiology. hold ASA if platelets < 50,000 and Cardiology concurs.\par Folic acid\par To ER prn fever\par RTC in 1 month\par

## 2022-12-09 NOTE — PHYSICAL EXAM
[Fully active, able to carry on all pre-disease performance without restriction] : Status 0 - Fully active, able to carry on all pre-disease performance without restriction [Normal] : affect appropriate [de-identified] : RRR. S1S2 normal. Gr 2/6 holosystolic murmur LLSB. No gallops.

## 2022-12-09 NOTE — HISTORY OF PRESENT ILLNESS
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 Chronic left knee pain stable, improves with walking.  Has BL shoulder pain-does some ROM activitiites.. He has been more active recently.  Works on odd jobs outdoors. No other c/o. No chest pain, SOB, abdominal pain, jaundice, dark urine, melena, BRBPR, fever, night sweats, swollen glands, arthritis, rash, limb pain/swelling. Weight stable. Taking Prednisone 5 mg daily.  Followed closely by cardiac transplant team.

## 2022-12-09 NOTE — REVIEW OF SYSTEMS
[Joint Pain] : joint pain [Negative] : Allergic/Immunologic [FreeTextEntry9] : Left knee pain, BL shoulder pain

## 2022-12-14 NOTE — PROGRESS NOTE ADULT - ATTENDING COMMENTS
Team discussed with Dr Tavarez who feels PICC line for access not advised.  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  Dialtiazem 120QD , Lasix 40, Lovenox 80bid  Vanco/Cefipime  90/- 110/, Afebrile, HR 80-90  I/O: -1180  03-09    137  |  106  |  22  ----------------------------<  108<H>  5.4<H>   |  20<L>  |  1.19    Ca    8.5      09 Mar 2018 07:17    TPro  6.4  /  Alb  2.9<L>  /  TBili  0.4  /  DBili  x   /  AST  25  /  ALT  28  /  AlkPhos  77  03-09                          9.6    no change 23.5  )-----------( 449      ( 09 Mar 2018 07:17 )             30.2   Immunohistochemistry shows similar commplement deposition as last week with no histological evidence for AMR.   Plan:  One further dose of Lasix tomorrow and d/c  No change in prograf dose 8 bid. Will repeat trough this evening because of concern about possible rapid rise.  Send culture from finger debridement.   Dr Parker to discuss management of ischemic digit with Plastics (Dr Rousseau)  Rheumatology consult. 18

## 2022-12-28 ENCOUNTER — APPOINTMENT (OUTPATIENT)
Dept: HEART FAILURE | Facility: CLINIC | Age: 72
End: 2022-12-28

## 2022-12-28 ENCOUNTER — NON-APPOINTMENT (OUTPATIENT)
Age: 72
End: 2022-12-28

## 2022-12-28 VITALS
HEART RATE: 98 BPM | BODY MASS INDEX: 29.99 KG/M2 | OXYGEN SATURATION: 97 % | SYSTOLIC BLOOD PRESSURE: 141 MMHG | HEIGHT: 65 IN | DIASTOLIC BLOOD PRESSURE: 89 MMHG | WEIGHT: 180 LBS | TEMPERATURE: 97.6 F

## 2022-12-28 PROCEDURE — 99214 OFFICE O/P EST MOD 30 MIN: CPT | Mod: 25

## 2022-12-28 PROCEDURE — 93000 ELECTROCARDIOGRAM COMPLETE: CPT

## 2022-12-28 NOTE — PHYSICAL EXAM
[Well Developed] : well developed [Well Nourished] : well nourished [Normal] : normal S1, S2, no murmur, no rub, no gallop [Normal S1, S2] : normal S1, S2 [de-identified] : no jvd [de-identified] : +III/VI diastolic murmur, tachycardic

## 2022-12-28 NOTE — HISTORY OF PRESENT ILLNESS
[FreeTextEntry1] : Mr. Baez is a 71 year old man with past medical history of a nonischemic cardiomyopathy and chronic systolic heart failure s/p HM2 LVAD in June 2017 complicated by recurrent GI hemorrhage and possible pump thrombosis who underwent heart transplant on 2/23/18 with a hepatitis C positive donor. His course was complicated by bilateral IJ/subclavian thrombi, a persistent small right sided pleural effusion, as well as acute on chronic renal failure of unclear etiology. In addition, his post-operative course was complicated by graft dysfunction of unclear etiology and persistent C4d positive staining on endomyocardial biopsy with negative DSAs. He developed joshua evidence of graft dysfunction leading to treatment with plasmapheresis, IVIG, and rituximab. Subsequently in June of 2018 he was found to have an pneumonia, that was ultimately diagnosed as Nocardia. This was successfully treated with therapy completed in August 2019.\par \par In the spring 2020, he was admitted with hemolytic anemia of unclear etiology. There were no clear precipitating factors, such as infection. He was treated with prednisone and Rituximab. Given the potential for CNI inhibitors leading to hemolytic anemia, we transitioned him to everolimus and he remained on high dose prednisone. He was subsequently admitted with acute anemia (not hemolytic). He developed severe leukopenia and Klebsiella bacteremia. Following treatment of this, he developed a severe C. diff colitis infection leading to a prolonged recovery. He was weaned to lower dose prednisone and the everolimus was transitioned back to tacrolimus. At the time of discharge he was found to have low levels of CMV viremia and was started back on valganciclovir. \par \par 4th annual R/LHC and biopsy (2/28/2022) showed normal hemodynamics EMBX ISHLT Grade 0R, IF C4D 50% +1 staining, and larger LAD.\par \par Pt here today for routine follow up clinic visit. Since last seen he reports doing ok. He is being followed by hematology for mixed type autoimmune hemolytic anemia and remains on prednisone 5mg daily since august. His platelets have remained . He walks daily 3-4 blocks with a cane, limited by b/l knee pain.  Pt complains of sometimes feels "abdominal tightness" and b/l shoulder discomfort, but not worse.  Reports has normal bowel movements.  Reports occasional nosebleeds. He has no PND, orthopnea, cough, or swelling in the legs.  He has no fevers, chills, sweats, dysuria, hematuria, or headaches.  Appetite good, and sleeping well.  Received covid booster and +Covid PCR in january, he was asymptomatic, no treatment.\par \par Health maintenance:\par DEXA 5/2019, 6/2021: osteopenia, 6/2022: worsening osteopenia. Followed by Dr. Clemens.\par CT Colon (4/2017) IMPRESSION: No colonic polyp or mass.\par PSA 6.76 ( 4/29/2022)\par HgA1c 5.3% (1/26/22)

## 2022-12-28 NOTE — CARDIOLOGY SUMMARY
[de-identified] : DSE 12/22/2020: Conclusions:\par 1. Normal hemodynamic response.\par 2. Normal electrocardiographic response.\par 3. Overall preserved left ventricular systolic function\par with mild hypokinesis of the mid to distal anterior wall at\par baseline. Normal augmentation in left ventricular systolic\par function with dobutamine infusion.\par 4. No evidence of inducible ischemia on stress\par echocardiogram images.\par *** Compared with echocardiogram of 12/2/2020, overall\par preserved left ventricular systolic function at baseline\par with mild hypokiesis of the mid to distal anterior wall.\par Normal augmentation in left ventricular systolic function\par with dobutamine infusion.\par \par 2/20/20: DSE EF 50-55% no evidence of inducible ischemia on pharmacologic stress echo images [de-identified] : \par TTE 7/25/22: EF 60%, LVIDD 4.5cm, normal LV function, normal RV size with decreased RV function, mild TR, no pericardial effusion\par \par TTE 2/28/22: EF 65% LVIDD not calculated. normal biV function. 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 minimal TR. no pericardial effusion. 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  No additional coronary-cameral fistulae noted. 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 IVUS performed of the RCA\par which was normal. Normal RHC\par \par 12/2/2020: RHC/Biopsy ISHLT Grade 0R\par 11/18/2020:  L/RHC w/ IVUS:\par CORONARY VESSELS: The coronary circulation is right dominant.\par LM:   --  LM: The vessel was very large sized. Angiography showed\par aneurysmal dilatation.\par LAD:   --  Proximal LAD: The vessel was very large sized. Angiography\par showed aneurysmal dilatation.\par --  Mid LAD: The vessel was very large sized and excessively ectatic. A\par fistula to the left ventricle was identified.\par --  Distal LAD: Normal. 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 No significant intimal thickening/hyperplasia or\par plaque was observed.

## 2022-12-28 NOTE — DISCUSSION/SUMMARY
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 Seen by Dr Rosario last month. Appreciate thoughtful input. To remain on pred 5 minimum.  \par intolerant of cellcept due leukopenia. 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 everolimus d/c for that reason. \par No transplant coronary disease. Renal function stable 1.4. \par  last Bx in Feb 2022 0R no cellular rejection. C4D 50%. +1 staining. Allosure < 0.12 No histopath features of AMR. DSA Feb 2022 none\par Feb 2022 annual R/L : hemos normal. some further dilation in LAD. \par Followed by Dr sparks's endocrine. Dexa July 2022 showed progression of osteoperosis. Initiated on prolia Q 6mths.   \par Seen by rheumatology one year ago for tophi/gout. \par Feelins well. No SOB. Unlimited. No LE edema. No othopnea. No CP. \par meds: toprol 200, losartan 100 QD, prograf 3/3 bid ( 6.9 target 6-8.), pred 5  asa, vit D, folic PPI, prava 20. valtessa BID \par s/p COVID booster sept 2022\par 98/min 141/89 afebrile. \par TTE July 2022: normal LV function 60, LVEDD 4.5, RVnormal size decreased function. no valvular sissues. \par Dec 2022: WBC 7, Hb 11, Plt 118 \par Oct 2022: Na 140, K 4.3, Cl 106, Bi 22, G 97, BUN 33, Cr 1.5, Mg 1.7 \par Uric acid one year ago normal \par Last DSA May normal. \par Oct allosure < .12 \par Plan: \par no change in meds. \par check DSA allosure. \par surveilance TTE. \par Angiogram Feb Dr Carpenter \par Needs f/u with rheumatology : does he need treatment for gout. \par Have asked patient to return to Dr Chung who felt that knee replacement was high risk in 2019. Unlcear if this is due to anatomy or perceived cardiac risk. From a cardiac/transplant perspective patient could be taken through surgery with GA if it was felt that this would benefit him. \armand Campbell \par 40 mins spent with patient and reviewing chart \armand Campbell

## 2022-12-29 ENCOUNTER — OUTPATIENT (OUTPATIENT)
Dept: OUTPATIENT SERVICES | Facility: HOSPITAL | Age: 72
LOS: 1 days | Discharge: HOME | End: 2022-12-29

## 2022-12-29 DIAGNOSIS — D64.9 ANEMIA, UNSPECIFIED: ICD-10-CM

## 2022-12-29 DIAGNOSIS — Z94.1 HEART TRANSPLANT STATUS: Chronic | ICD-10-CM

## 2023-01-01 NOTE — EEG REPORT - THIS IS A
Initial Education:  Experienced breastfeeding mother. LGA, BS stable.  BF well, mother has no questions.  Able to express colostrum easily.       Discussed principles and importance of breast milk supply & production including frequent, adequate stimulation as well as supply & demand theory w/mom and partner.  Educated that average timeframe for mature milk to start coming in is about 3-5 days w/consistent stimulation of at least 8 breastfeeding/pumping sessions per 24 hr period of time. Explained benefits and properties of colostrum (smaller amt's. but concentrated with all the nutritional needs for  baby).  Sometimes the ticker consistency of colostrum makes it difficult for infant to initiate milk flow or for pump to extract droplets. Educated on items to monitor to adequate intake the first 48 hours (wt loss, I/O's, blood sugar monitoring). Discussed baby's hunger cues feeding frequency/adequate transfer of breast milk S&S/duration, and waking/calming techniques. Encouraged rooming in w/baby, observing for early feeding cues, safe S2S in between feeding w/both parents especially while establishing breastfeeding/milk supply. Reviewed proper positioning (baby up to level of breasts/ supported w/pillows, head/shoulders/hips aligned, baby & mom tummy to tummy, nose/chin in contact w/breast, baby's hands on both sides of breast, etc) and latching (tongue in downward position and extending past inner gums, both lips flanged outwards, wide opened gapping mouth before latching, nipple aimed towards back of mouth, w/lip on areola (bottom areola mostly covered by bottom lip of baby). Educated that if infant unable to breathe when close to breast, they will open mouth & unlatch or turn head. Reviewed normal suck, swallow, breathing pattern. Educated that correct latch usually feels like pulls & tugs without nipple discomfort or pain. Incorrect latch is shallow, painful, feels like biting/chomping/pinching & mom's  Routine EEG with CSA nipple is compressed after latch. Enc. Mom to observe for nutritive suckling & audible swallowing vs \"nipple tip tickling\".

## 2023-01-03 ENCOUNTER — APPOINTMENT (OUTPATIENT)
Dept: RHEUMATOLOGY | Facility: CLINIC | Age: 73
End: 2023-01-03
Payer: MEDICARE

## 2023-01-03 PROBLEM — M17.10 PRIMARY OSTEOARTHRITIS OF KNEE, UNSPECIFIED LATERALITY: Status: ACTIVE | Noted: 2021-12-15

## 2023-01-03 PROCEDURE — 99214 OFFICE O/P EST MOD 30 MIN: CPT

## 2023-01-04 ENCOUNTER — INPATIENT (INPATIENT)
Facility: HOSPITAL | Age: 73
LOS: 7 days | Discharge: HOME HEALTH | DRG: 809 | End: 2023-01-12
Attending: INTERNAL MEDICINE | Admitting: INTERNAL MEDICINE
Payer: COMMERCIAL

## 2023-01-04 ENCOUNTER — RESULT REVIEW (OUTPATIENT)
Age: 73
End: 2023-01-04

## 2023-01-04 ENCOUNTER — OUTPATIENT (OUTPATIENT)
Dept: OUTPATIENT SERVICES | Facility: HOSPITAL | Age: 73
LOS: 1 days | End: 2023-01-04

## 2023-01-04 ENCOUNTER — APPOINTMENT (OUTPATIENT)
Dept: HEMATOLOGY ONCOLOGY | Facility: CLINIC | Age: 73
End: 2023-01-04
Payer: MEDICARE

## 2023-01-04 VITALS
HEART RATE: 98 BPM | DIASTOLIC BLOOD PRESSURE: 75 MMHG | OXYGEN SATURATION: 100 % | SYSTOLIC BLOOD PRESSURE: 111 MMHG | RESPIRATION RATE: 16 BRPM | TEMPERATURE: 97 F

## 2023-01-04 VITALS
SYSTOLIC BLOOD PRESSURE: 89 MMHG | TEMPERATURE: 97.2 F | HEART RATE: 101 BPM | DIASTOLIC BLOOD PRESSURE: 56 MMHG | RESPIRATION RATE: 16 BRPM | OXYGEN SATURATION: 96 %

## 2023-01-04 DIAGNOSIS — D64.9 ANEMIA, UNSPECIFIED: ICD-10-CM

## 2023-01-04 DIAGNOSIS — Z94.1 HEART TRANSPLANT STATUS: Chronic | ICD-10-CM

## 2023-01-04 DIAGNOSIS — C69.30 MALIGNANT NEOPLASM OF UNSPECIFIED CHOROID: ICD-10-CM

## 2023-01-04 LAB
ALBUMIN SERPL ELPH-MCNC: 4.1 G/DL — SIGNIFICANT CHANGE UP (ref 3.3–5)
ALBUMIN SERPL ELPH-MCNC: 4.2 G/DL — SIGNIFICANT CHANGE UP (ref 3.3–5)
ALP SERPL-CCNC: 54 U/L — SIGNIFICANT CHANGE UP (ref 40–120)
ALP SERPL-CCNC: 56 U/L — SIGNIFICANT CHANGE UP (ref 40–120)
ALT FLD-CCNC: 10 U/L — SIGNIFICANT CHANGE UP (ref 10–45)
ALT FLD-CCNC: 10 U/L — SIGNIFICANT CHANGE UP (ref 10–45)
ANION GAP SERPL CALC-SCNC: 13 MMOL/L — SIGNIFICANT CHANGE UP (ref 5–17)
ANISOCYTOSIS BLD QL: SLIGHT — SIGNIFICANT CHANGE UP
APPEARANCE UR: CLEAR — SIGNIFICANT CHANGE UP
APTT BLD: 18.2 SEC — LOW (ref 27.5–35.5)
AST SERPL-CCNC: 36 U/L — SIGNIFICANT CHANGE UP (ref 10–40)
AST SERPL-CCNC: 45 U/L — HIGH (ref 10–40)
BACTERIA # UR AUTO: NEGATIVE — SIGNIFICANT CHANGE UP
BASO STIPL BLD QL SMEAR: PRESENT — SIGNIFICANT CHANGE UP
BASOPHILS # BLD AUTO: 0 K/UL — SIGNIFICANT CHANGE UP (ref 0–0.2)
BASOPHILS # BLD AUTO: 0 K/UL — SIGNIFICANT CHANGE UP (ref 0–0.2)
BASOPHILS NFR BLD AUTO: 0 % — SIGNIFICANT CHANGE UP (ref 0–2)
BASOPHILS NFR BLD AUTO: 0 % — SIGNIFICANT CHANGE UP (ref 0–2)
BILIRUB DIRECT SERPL-MCNC: 0.5 MG/DL — HIGH (ref 0–0.3)
BILIRUB DIRECT SERPL-MCNC: 0.5 MG/DL — HIGH (ref 0–0.3)
BILIRUB INDIRECT FLD-MCNC: 5.5 MG/DL — HIGH (ref 0.2–1)
BILIRUB SERPL-MCNC: 6 MG/DL — HIGH (ref 0.2–1.2)
BILIRUB SERPL-MCNC: 6 MG/DL — HIGH (ref 0.2–1.2)
BILIRUB UR-MCNC: NEGATIVE — SIGNIFICANT CHANGE UP
BLD GP AB SCN SERPL QL: POSITIVE — SIGNIFICANT CHANGE UP
BUN SERPL-MCNC: 44 MG/DL — HIGH (ref 7–23)
CALCIUM SERPL-MCNC: 9 MG/DL — SIGNIFICANT CHANGE UP (ref 8.4–10.5)
CHLORIDE SERPL-SCNC: 107 MMOL/L — SIGNIFICANT CHANGE UP (ref 96–108)
CO2 SERPL-SCNC: 21 MMOL/L — LOW (ref 22–31)
COLOR SPEC: YELLOW — SIGNIFICANT CHANGE UP
CREAT SERPL-MCNC: 1.72 MG/DL — HIGH (ref 0.5–1.3)
DIFF PNL FLD: NEGATIVE — SIGNIFICANT CHANGE UP
EGFR: 42 ML/MIN/1.73M2 — LOW
EOSINOPHIL # BLD AUTO: 0 K/UL — SIGNIFICANT CHANGE UP (ref 0–0.5)
EOSINOPHIL # BLD AUTO: 0.08 K/UL — SIGNIFICANT CHANGE UP (ref 0–0.5)
EOSINOPHIL NFR BLD AUTO: 0 % — SIGNIFICANT CHANGE UP (ref 0–6)
EOSINOPHIL NFR BLD AUTO: 0.9 % — SIGNIFICANT CHANGE UP (ref 0–6)
EPI CELLS # UR: 0 /HPF — SIGNIFICANT CHANGE UP
FLUAV AG NPH QL: SIGNIFICANT CHANGE UP
FLUBV AG NPH QL: SIGNIFICANT CHANGE UP
GAS PNL BLDV: SIGNIFICANT CHANGE UP
GLUCOSE SERPL-MCNC: 88 MG/DL — SIGNIFICANT CHANGE UP (ref 70–99)
GLUCOSE UR QL: NEGATIVE — SIGNIFICANT CHANGE UP
HAPTOGLOB SERPL-MCNC: <20 MG/DL — LOW (ref 34–200)
HCT VFR BLD CALC: 17.9 % — CRITICAL LOW (ref 39–50)
HCT VFR BLD CALC: 18.4 % — CRITICAL LOW (ref 39–50)
HCT VFR BLD CALC: 18.8 % — CRITICAL LOW (ref 39–50)
HGB BLD-MCNC: 6.3 G/DL — CRITICAL LOW (ref 13–17)
HGB BLD-MCNC: 6.5 G/DL — CRITICAL LOW (ref 13–17)
HGB BLD-MCNC: 6.5 G/DL — CRITICAL LOW (ref 13–17)
HYALINE CASTS # UR AUTO: 1 /LPF — SIGNIFICANT CHANGE UP (ref 0–2)
INR BLD: 1.1 RATIO — SIGNIFICANT CHANGE UP (ref 0.88–1.16)
KETONES UR-MCNC: NEGATIVE — SIGNIFICANT CHANGE UP
LDH SERPL L TO P-CCNC: 696 U/L — HIGH (ref 50–242)
LDH SERPL L TO P-CCNC: 858 U/L — HIGH (ref 50–242)
LEUKOCYTE ESTERASE UR-ACNC: NEGATIVE — SIGNIFICANT CHANGE UP
LYMPHOCYTES # BLD AUTO: 0.81 K/UL — LOW (ref 1–3.3)
LYMPHOCYTES # BLD AUTO: 1.29 K/UL — SIGNIFICANT CHANGE UP (ref 1–3.3)
LYMPHOCYTES # BLD AUTO: 11 % — LOW (ref 13–44)
LYMPHOCYTES # BLD AUTO: 8.8 % — LOW (ref 13–44)
MCHC RBC-ENTMCNC: 32.7 PG — SIGNIFICANT CHANGE UP (ref 27–34)
MCHC RBC-ENTMCNC: 34.6 G/DL — SIGNIFICANT CHANGE UP (ref 32–36)
MCHC RBC-ENTMCNC: 35.2 GM/DL — SIGNIFICANT CHANGE UP (ref 32–36)
MCHC RBC-ENTMCNC: 35.3 GM/DL — SIGNIFICANT CHANGE UP (ref 32–36)
MCHC RBC-ENTMCNC: 36.1 PG — HIGH (ref 27–34)
MCHC RBC-ENTMCNC: 36.2 PG — HIGH (ref 27–34)
MCV RBC AUTO: 102.2 FL — HIGH (ref 80–100)
MCV RBC AUTO: 102.9 FL — HIGH (ref 80–100)
MCV RBC AUTO: 94.5 FL — SIGNIFICANT CHANGE UP (ref 80–100)
MONOCYTES # BLD AUTO: 0.65 K/UL — SIGNIFICANT CHANGE UP (ref 0–0.9)
MONOCYTES # BLD AUTO: 0.94 K/UL — HIGH (ref 0–0.9)
MONOCYTES NFR BLD AUTO: 7 % — SIGNIFICANT CHANGE UP (ref 2–14)
MONOCYTES NFR BLD AUTO: 8 % — SIGNIFICANT CHANGE UP (ref 2–14)
NEUTROPHILS # BLD AUTO: 7.37 K/UL — SIGNIFICANT CHANGE UP (ref 1.8–7.4)
NEUTROPHILS # BLD AUTO: 9.5 K/UL — HIGH (ref 1.8–7.4)
NEUTROPHILS NFR BLD AUTO: 79.8 % — HIGH (ref 43–77)
NEUTROPHILS NFR BLD AUTO: 81 % — HIGH (ref 43–77)
NITRITE UR-MCNC: NEGATIVE — SIGNIFICANT CHANGE UP
NRBC # BLD: 2 /100 WBCS — HIGH (ref 0–0)
NRBC # BLD: 3 /100 — HIGH (ref 0–0)
NRBC # BLD: SIGNIFICANT CHANGE UP /100 WBCS (ref 0–0)
PH UR: 6 — SIGNIFICANT CHANGE UP (ref 5–8)
PLAT MORPH BLD: NORMAL — SIGNIFICANT CHANGE UP
PLATELET # BLD AUTO: 138 K/UL — LOW (ref 150–400)
PLATELET # BLD AUTO: 151 K/UL — SIGNIFICANT CHANGE UP (ref 150–400)
PLATELET # BLD AUTO: SIGNIFICANT CHANGE UP K/UL (ref 150–400)
POIKILOCYTOSIS BLD QL AUTO: SLIGHT — SIGNIFICANT CHANGE UP
POLYCHROMASIA BLD QL SMEAR: SLIGHT — SIGNIFICANT CHANGE UP
POTASSIUM SERPL-MCNC: 4.5 MMOL/L — SIGNIFICANT CHANGE UP (ref 3.5–5.3)
POTASSIUM SERPL-SCNC: 4.5 MMOL/L — SIGNIFICANT CHANGE UP (ref 3.5–5.3)
PROT SERPL-MCNC: 6.6 G/DL — SIGNIFICANT CHANGE UP (ref 6–8.3)
PROT SERPL-MCNC: 6.8 G/DL — SIGNIFICANT CHANGE UP (ref 6–8.3)
PROT UR-MCNC: ABNORMAL
PROTHROM AB SERPL-ACNC: 12.7 SEC — SIGNIFICANT CHANGE UP (ref 10.5–13.4)
RBC # BLD: 1.74 M/UL — LOW (ref 4.2–5.8)
RBC # BLD: 1.74 M/UL — LOW (ref 4.2–5.8)
RBC # BLD: 1.8 M/UL — LOW (ref 4.2–5.8)
RBC # BLD: 1.99 M/UL — LOW (ref 4.2–5.8)
RBC # FLD: 15.8 % — HIGH (ref 10.3–14.5)
RBC # FLD: 20.4 % — HIGH (ref 10.3–14.5)
RBC # FLD: 20.6 % — HIGH (ref 10.3–14.5)
RBC BLD AUTO: ABNORMAL
RBC CASTS # UR COMP ASSIST: 1 /HPF — SIGNIFICANT CHANGE UP (ref 0–4)
RETICS #: 87.5 K/UL — SIGNIFICANT CHANGE UP (ref 25–125)
RETICS/RBC NFR: 5 % — HIGH (ref 0.5–2.5)
RH IG SCN BLD-IMP: POSITIVE — SIGNIFICANT CHANGE UP
RSV RNA NPH QL NAA+NON-PROBE: SIGNIFICANT CHANGE UP
SARS-COV-2 RNA SPEC QL NAA+PROBE: SIGNIFICANT CHANGE UP
SODIUM SERPL-SCNC: 141 MMOL/L — SIGNIFICANT CHANGE UP (ref 135–145)
SP GR SPEC: 1.02 — SIGNIFICANT CHANGE UP (ref 1.01–1.02)
URATE SERPL-MCNC: 11.1 MG/DL
UROBILINOGEN FLD QL: NEGATIVE — SIGNIFICANT CHANGE UP
WBC # BLD: 11.73 K/UL — HIGH (ref 3.8–10.5)
WBC # BLD: 9.24 K/UL — SIGNIFICANT CHANGE UP (ref 3.8–10.5)
WBC # BLD: 9.3 K/UL — SIGNIFICANT CHANGE UP (ref 3.8–10.5)
WBC # FLD AUTO: 11.73 K/UL — HIGH (ref 3.8–10.5)
WBC # FLD AUTO: 9.24 K/UL — SIGNIFICANT CHANGE UP (ref 3.8–10.5)
WBC # FLD AUTO: 9.3 K/UL — SIGNIFICANT CHANGE UP (ref 3.8–10.5)
WBC UR QL: 1 /HPF — SIGNIFICANT CHANGE UP (ref 0–5)

## 2023-01-04 PROCEDURE — 99285 EMERGENCY DEPT VISIT HI MDM: CPT

## 2023-01-04 PROCEDURE — 99214 OFFICE O/P EST MOD 30 MIN: CPT

## 2023-01-04 PROCEDURE — 71045 X-RAY EXAM CHEST 1 VIEW: CPT | Mod: 26

## 2023-01-04 RX ORDER — DEXAMETHASONE 0.5 MG/5ML
40 ELIXIR ORAL ONCE
Refills: 0 | Status: COMPLETED | OUTPATIENT
Start: 2023-01-04 | End: 2023-01-04

## 2023-01-04 RX ORDER — TACROLIMUS 5 MG/1
1 CAPSULE ORAL
Qty: 0 | Refills: 0 | DISCHARGE

## 2023-01-04 RX ORDER — TACROLIMUS 5 MG/1
1 CAPSULE ORAL EVERY 12 HOURS
Refills: 0 | Status: DISCONTINUED | OUTPATIENT
Start: 2023-01-04 | End: 2023-01-05

## 2023-01-04 RX ORDER — PANTOPRAZOLE SODIUM 20 MG/1
40 TABLET, DELAYED RELEASE ORAL
Refills: 0 | Status: DISCONTINUED | OUTPATIENT
Start: 2023-01-04 | End: 2023-01-12

## 2023-01-04 RX ORDER — METOPROLOL TARTRATE 50 MG
200 TABLET ORAL DAILY
Refills: 0 | Status: DISCONTINUED | OUTPATIENT
Start: 2023-01-04 | End: 2023-01-12

## 2023-01-04 RX ORDER — DEXAMETHASONE 0.5 MG/5ML
40 ELIXIR ORAL DAILY
Refills: 0 | Status: DISCONTINUED | OUTPATIENT
Start: 2023-01-04 | End: 2023-01-09

## 2023-01-04 RX ORDER — CHOLECALCIFEROL (VITAMIN D3) 125 MCG
1 CAPSULE ORAL
Qty: 0 | Refills: 0 | DISCHARGE

## 2023-01-04 RX ORDER — LOSARTAN POTASSIUM 100 MG/1
100 TABLET, FILM COATED ORAL DAILY
Refills: 0 | Status: DISCONTINUED | OUTPATIENT
Start: 2023-01-04 | End: 2023-01-12

## 2023-01-04 RX ADMIN — Medication 40 MILLIGRAM(S): at 17:33

## 2023-01-04 NOTE — ASSESSMENT
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 Was in remission with well compensated hemolytic anemia on low dose prednisone, but relapsed after tapered to 3 mg daily. 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 Had been responding well to minimal dose increase of Prednisone to 5 mg daily with rising hgb. Today, c/o weakness, dizziness, LOBATO for last 2 days.  Hgb 6.5.  Sclera icteric.  Will send to [facility] via EMS.  \par \par Plan:\par Continue Prednisone 5 mg daily -- would not taper further\par Tacrolimus/ ASA per Cardiology. hold ASA if platelets < 50,000 and Cardiology concurs.\par Folic acid\par Hgb 6.5. Sclera icteric. \par Transport to [facility] ED today-ED and fellows notified. \par

## 2023-01-04 NOTE — ED PROVIDER NOTE - NS ED ROS FT
GENERAL: No fever, chills + fatigue  EYES: no vision changes, no discharge.   ENT: no difficulty swallowing or speaking   CARDIAC: no chest pain/pressure, + exertional SOB, no lower extremity swelling  PULMONARY: no cough, +exertional SOB  GI: no abdominal pain, n/v/d  : no dysuria, no hematuria  SKIN: no rashes, no ecchymosis  NEURO: no headache, syncpope  MSK: No joint pain, myalgia, weakness.

## 2023-01-04 NOTE — ED CLERICAL - NS ED CLERK NOTE PRE-ARRIVAL INFORMATION; ADDITIONAL PRE-ARRIVAL INFORMATION
CC/Reason For referral: HGB 6.5, SOB, weak. hx Hemolytic Anemia.   Preferred Consultant(if applicable):  Who admits for you (if needed):  Do you have documents you would like to fax over?  Would you still like to speak to an ED attending? No

## 2023-01-04 NOTE — H&P ADULT - NSHPPHYSICALEXAM_GEN_ALL_CORE
Vital Signs Last 24 Hrs  T(C): 36.6 (04 Jan 2023 19:30), Max: 36.7 (04 Jan 2023 16:24)  T(F): 97.9 (04 Jan 2023 19:30), Max: 98 (04 Jan 2023 16:24)  HR: 99 (04 Jan 2023 19:30) (97 - 99)  BP: 106/67 (04 Jan 2023 19:30) (104/71 - 111/75)  BP(mean): --  RR: 18 (04 Jan 2023 19:30) (16 - 19)  SpO2: 98% (04 Jan 2023 19:30) (96% - 100%)    Parameters below as of 04 Jan 2023 19:30  Patient On (Oxygen Delivery Method): room air      PHYSICAL EXAM:  GENERAL: NAD, well-developed  HEAD:  Atraumatic, Normocephalic  EYES: EOMI, PERRLA, conjunctiva and sclera clear  NECK: Supple, No JVD  CHEST/LUNG: Clear to auscultation bilaterally; No wheeze  HEART: Regular rate and rhythm; No murmurs, rubs, or gallops  ABDOMEN: Soft, Nontender, Nondistended; Bowel sounds present  EXTREMITIES:  2+ Peripheral Pulses, No clubbing, cyanosis, or edema  PSYCH: AAOx3  NEUROLOGY: non-focal  SKIN: No rashes or lesions

## 2023-01-04 NOTE — PHYSICAL EXAM
[Fully active, able to carry on all pre-disease performance without restriction] : Status 0 - Fully active, able to carry on all pre-disease performance without restriction [Normal] : affect appropriate [de-identified] : RRR. S1S2 normal. Gr 2/6 holosystolic murmur LLSB. No gallops.

## 2023-01-04 NOTE — HISTORY OF PRESENT ILLNESS
[___ Month(s) Ago] : [unfilled] month(s) ago [FreeTextEntry1] : ongoing gout with tophi formation over the  right 3rd digit - \par \par

## 2023-01-04 NOTE — ED PROVIDER NOTE - CLINICAL SUMMARY MEDICAL DECISION MAKING FREE TEXT BOX
71 yo M on hx of heart transplant 2018 from Hep C donor, sent in from PMD for   anemia. + scleral icterus, pale conjunctiva and sublingual frenulum, non tender abdomen, no dark stools. Differential diagnosis includes but not limited to: hemolysis, GI bleeding, malignancy. hemolysis labs, including haptoglobin, reticulocyte count and LDH, type and screen for likely transfusion, occult to r/o GIB as etiology. Non-tender abdomen, will defer CT at this time as acute surgical etiology is unlikely. Anticipate admission.

## 2023-01-04 NOTE — PHYSICAL EXAM
[General Appearance - Alert] : alert [General Appearance - In No Acute Distress] : in no acute distress [Neck Appearance] : the appearance of the neck was normal [Neck Cervical Mass (___cm)] : no neck mass was observed [Jugular Venous Distention Increased] : there was no jugular-venous distention [Thyroid Diffuse Enlargement] : the thyroid was not enlarged [Thyroid Nodule] : there were no palpable thyroid nodules [Auscultation Breath Sounds / Voice Sounds] : lungs were clear to auscultation bilaterally [Heart Rate And Rhythm] : heart rate was normal and rhythm regular [Heart Sounds] : normal S1 and S2 [Heart Sounds Gallop] : no gallops [Murmurs] : no murmurs [Heart Sounds Pericardial Friction Rub] : no pericardial rub [Full Pulse] : the pedal pulses are present [Edema] : there was no peripheral edema [Bowel Sounds] : normal bowel sounds [Abdomen Soft] : soft [Abdomen Tenderness] : non-tender [Abdomen Mass (___ Cm)] : no abdominal mass palpated [Cervical Lymph Nodes Enlarged Posterior Bilaterally] : posterior cervical [Cervical Lymph Nodes Enlarged Anterior Bilaterally] : anterior cervical [Supraclavicular Lymph Nodes Enlarged Bilaterally] : supraclavicular [No CVA Tenderness] : no ~M costovertebral angle tenderness [No Spinal Tenderness] : no spinal tenderness [Skin Color & Pigmentation] : normal skin color and pigmentation [Skin Turgor] : normal skin turgor [] : no rash [Oriented To Time, Place, And Person] : oriented to person, place, and time [Impaired Insight] : insight and judgment were intact [Affect] : the affect was normal [FreeTextEntry1] : multiple deposits of topho over hands  and feet

## 2023-01-04 NOTE — ED ADULT NURSE REASSESSMENT NOTE - NS ED NURSE REASSESS COMMENT FT1
resting in nad, medicated per MD ordes, no complaints, as per MD, pRBC discontinued, will continue to monitor pt

## 2023-01-04 NOTE — ED PROVIDER NOTE - PHYSICAL EXAMINATION
GEN: Patient awake alert NAD.   HEENT: normocephalic, atraumatic, EOMI, + scleral icterus, + pale conjunctiva and sublingual frenulum.   CARDIAC: RRR, S1, S2, no murmur.   PULM: CTA B/L no wheeze, rhonchi, rales.   ABD: soft NT, ND, no rebound no guarding   MSK: Moving all extremities, no edema. 5/5 strength and full ROM in all extremities.     NEURO: A&Ox3, no focal neurological deficits  SKIN: warm, dry, no rash.

## 2023-01-04 NOTE — ED ADULT NURSE REASSESSMENT NOTE - NS ED NURSE REASSESS COMMENT FT1
as per blood bank after collecting two samples of patient's blood type, pt has antibodies, therefore need 2 additional samples,  all required labs sent, as per blood bank, 1st unit of PRBC will be several hours, will inform MD, pt remains in nad, , VS stable, no new complaints,  will continue to monitor pt

## 2023-01-04 NOTE — H&P ADULT - HISTORY OF PRESENT ILLNESS
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 Drop of Hb from 11.5 to 6.5 in ~3 weeks.     Pt is AOx3 but poor historian; unable to provide more information on why he was sent in. Pt endorse more exertional fatigue and sob in the last few days. Denies bleeding, hematuria, dark tarry stools. Pt had one prior colonoscopy "a long time ago". Pt had normal brown BM this AM

## 2023-01-04 NOTE — HISTORY OF PRESENT ILLNESS
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 Treatment - prednisone/Rituxan x 1\par 8/2022 -- relapse with thrombocytopenia as well  (Grayson's syndrome)\par 2/2018 Cardiac transplant\par Bilateral subclavian thrombosis\par UGI bleed [de-identified] : For last 2 days, pt has felt weak, dizzy and has LOBATO.  Gets SOB after 50 feet.   Sclerae noted to be icteric.  Chronic left knee pain stable, improves with walking.  Has BL shoulder pain-does some ROM activities  No chest pain, SOB, abdominal pain, jaundice, dark urine, melena, BRBPR, fever, night sweats, swollen glands, arthritis, rash, limb pain/swelling. Weight stable. Taking Prednisone 5 mg daily.  Followed closely by cardiac transplant team.

## 2023-01-04 NOTE — ED PROVIDER NOTE - PROGRESS NOTE DETAILS
Attending MD Mcelroy: Lab work notable for hemoglobin of 6.5.  Elevated bilirubin and LDH.  Concern for hemolysis.  Page has been sent out to hematology to discuss case given concern for possible hemolysis.  Holding transfusion for now pending discussion with hematology. Chacho, PGY4 - received pt as sign-out, pt with autoimmune hemolytic anemia sent from [facility] for unknown reason. As per [employer] review, pt currently with recurrence of hemolytic anemia with recent increased in prednisone to 5mg. Discussed with heme fellow, recommends prbc transfusion and will follow while inpatient. [facility]ICU consulted for h/o heart transplant, will eval pt Chacho, PGY4 - Hematology previously recommended prbc transfusion, but received call from heme follow and chart note documented stating to NOT transfuse prbc as would likely hemolyze unless pt very symptomatic, recommending Dexamethasone 40mg x 5 days, will follow pt. Pt reevaluated, denies any fatigue, CP, SOB, lightheadedness at this time. Will hold off on prbc transfusion, dexamethasone ordered. Spoke to [facility]ICU [phone] and heart transplant surgery [phone], recommending medical admission and heart transplant surgery to see pt tomorrow AM; states no contraindication to prbc transfusion from their perspective if heme to later recommend

## 2023-01-04 NOTE — ED ADULT NURSE REASSESSMENT NOTE - NS ED NURSE REASSESS COMMENT FT1
spoke with MD Flor  regarding delay in  1st unit of PRBC, blood released will not be ready for several  hours, pt remains stable at this team , MD is okay with waiting for 1st unit of PRBC  will continue to assess pt for any change in condition,

## 2023-01-04 NOTE — ASSESSMENT
[FreeTextEntry1] : 71 year-old female with complicated pmh hx including hear transplant, hemolytic anemia\par \par 1 tophaceous gout - will start febuxostat for a quicker drop on uric acid - on prednisone 5 mg due to transplant\par check uric acid levels - \par 2. CKD 3 - caution with colchicine - will consider if flares\par \par \par I reviewed previous labs results with patients.\par Laboratory tests ordered today\par Diagnosis and Prognosis discussed\par Continue with current medications\par medications refilled\par education provided on febuxostat\par F/u  6 weeks\par \par

## 2023-01-04 NOTE — ED ADULT NURSE REASSESSMENT NOTE - NS ED NURSE REASSESS COMMENT FT1
per tele tech patient became tachycardic to 150s-170s on portable CM for about 1 minute. patient HR now in 110s. patient denies CP, SOB, palpitations. full VS obtained. NP Chambers made aware. NP to come assess patient. ECG to be obtained

## 2023-01-04 NOTE — REVIEW OF SYSTEMS
[Joint Pain] : joint pain [Negative] : Allergic/Immunologic [Fatigue] : fatigue [SOB on Exertion] : shortness of breath during exertion [FreeTextEntry2] : dizzy [FreeTextEntry9] : Left knee pain, BL shoulder pain

## 2023-01-04 NOTE — ED ADULT NURSE NOTE - OBJECTIVE STATEMENT
received pt in assigned area a&oXo3  with periods of disorientation & repetition, also a poor historian , does not know why he was sent here today, as per paperwork from [facility], pt 's HGB= 6.5, pt states he has been feeling weak, denies any cp, sob, or dizziness,  denies any rectal bleeding or hematemesis , pt able to follow all commands,  Resps even and non labored, all labs drawn and sent, pt has poor venous access, MD made aware, IV placed to left a/c 18 gauge via MD by u/s guidance , cxray completed, pending dispo, will continue to monitor pt

## 2023-01-04 NOTE — ED PROVIDER NOTE - OBJECTIVE STATEMENT
71 yo M on hx of heart transplant 2018 from Hep C donor, sent in from PMD for   anemia HH 6.5. Pt is AOx3 but poor historian; unable to provide more information on why he was sent in. Pt endorse more exertional fatigue and sob in the last few days. Denies bleeding, hematuria, dark tarry stools. Pt had one prior colonoscopy "a long time ago". Pt had normal brown BM this AM.

## 2023-01-04 NOTE — H&P ADULT - ASSESSMENT
70 male h/o hemolytic anemia, NICM s/p heart transplnt, hep c, here with anemia    hemolytic anemia  heme consulted  40mg PO dexamethasone x4 days. If no response, will consider IVIG/Rituxan  - Hemolysis labs daily  hold transfusion unless symptomatic    s/p heart transplant  cont transplant meds  cont home cardiac meds    Advanced care planning was discussed with patient and family.  Advanced care planning forms were reviewed and discussed as appropriate.  Differential diagnosis and plan of care discussed with patient after the evaluation.   Pain assessed and judicious use of narcotics when appropriate was discussed.  Importance of Fall prevention discussed.  Counseling on Smoking and Alcohol cessation was offered when appropriate.  Counseling on Diet, exercise, and medication compliance was done.   Approx 30 minutes spent.

## 2023-01-04 NOTE — ED PROVIDER NOTE - ATTENDING CONTRIBUTION TO CARE
Attending MD Mcelroy:  I personally have seen and examined this patient. I have performed a substantive portion of the visit including all aspects of the medical decision making.  Resident note reviewed and agree on plan of care and except where noted.      72-year-old gentleman with a history of heart transplant 2018, Grayson syndrome presenting for evaluation of hemoglobin of 6.5.  Felt short of breath and weak yesterday, currently no symptoms.  Patient overall is a fairly limited historian and cannot relay more beyond what is provided in documentation.  Patient did note that several providers have noted that both of his eyes appear yellow recently.  The patient denies hematochezia or melena.    Vital signs nonactionable, normotensive blood pressure 111.  Patient is awake and alert sitting up in the stretcher in no apparent distress.  There is noticeable scleral icterus.  Heart sounds regular clear lungs anteriorly.  Abdomen soft nontender nondistended.  Extremities warm and well-perfused.    Concern for likely acute on chronic anemia due to hemolytic process.  Patient with likely symptomatic anemia.  No clinical evidence of active GI bleed at this time.  We will repeat lab work and likely admit for transfusion support and subspecialist consultation.        *The above represents an initial assessment/impression. Please refer to progress notes for potential changes in patient clinical course*

## 2023-01-04 NOTE — CONSULT LETTER
[Dear  ___] : Dear  [unfilled], [Courtesy Letter:] : I had the pleasure of seeing your patient, [unfilled], in my office today. [Please see my note below.] : Please see my note below. [Sincerely,] : Sincerely, [DrEduar  ___] : Dr. HAN [FreeTextEntry2] : Dr. Lisa Ac [FreeTextEntry3] : Tao Rosario M.D., FACP\par Professor of Medicine\par [employer]\par Associate Chief, Division of Hematology\par [facility]\par [facility]\par [address] [place] [address]\[place] [address]\par [phone]\par \par \par \par

## 2023-01-05 DIAGNOSIS — Z94.1 HEART TRANSPLANT STATUS: ICD-10-CM

## 2023-01-05 DIAGNOSIS — D84.9 IMMUNODEFICIENCY, UNSPECIFIED: ICD-10-CM

## 2023-01-05 DIAGNOSIS — D59.13 MIXED TYPE AUTOIMMUNE HEMOLYTIC ANEMIA: ICD-10-CM

## 2023-01-05 DIAGNOSIS — Z79.2 LONG TERM (CURRENT) USE OF ANTIBIOTICS: ICD-10-CM

## 2023-01-05 DIAGNOSIS — N17.9 ACUTE KIDNEY FAILURE, UNSPECIFIED: ICD-10-CM

## 2023-01-05 DIAGNOSIS — N18.9 CHRONIC KIDNEY DISEASE, UNSPECIFIED: ICD-10-CM

## 2023-01-05 LAB
ANION GAP SERPL CALC-SCNC: 15 MMOL/L — SIGNIFICANT CHANGE UP (ref 5–17)
BILIRUB DIRECT SERPL-MCNC: 0.7 MG/DL — HIGH (ref 0–0.3)
BILIRUB INDIRECT FLD-MCNC: 5.4 MG/DL — HIGH (ref 0.2–1)
BILIRUB SERPL-MCNC: 6.1 MG/DL — HIGH (ref 0.2–1.2)
BUN SERPL-MCNC: 45 MG/DL — HIGH (ref 7–23)
CALCIUM SERPL-MCNC: 8.9 MG/DL — SIGNIFICANT CHANGE UP (ref 8.4–10.5)
CHLORIDE SERPL-SCNC: 105 MMOL/L — SIGNIFICANT CHANGE UP (ref 96–108)
CO2 SERPL-SCNC: 21 MMOL/L — LOW (ref 22–31)
CREAT SERPL-MCNC: 1.61 MG/DL — HIGH (ref 0.5–1.3)
EGFR: 45 ML/MIN/1.73M2 — LOW
GLUCOSE SERPL-MCNC: 167 MG/DL — HIGH (ref 70–99)
HAPTOGLOB SERPL-MCNC: <20 MG/DL — LOW (ref 34–200)
HCT VFR BLD CALC: 19.2 % — CRITICAL LOW (ref 39–50)
HGB BLD-MCNC: 6.8 G/DL — CRITICAL LOW (ref 13–17)
MCHC RBC-ENTMCNC: 35.4 GM/DL — SIGNIFICANT CHANGE UP (ref 32–36)
MCHC RBC-ENTMCNC: 36.4 PG — HIGH (ref 27–34)
MCV RBC AUTO: 102.7 FL — HIGH (ref 80–100)
NRBC # BLD: 1 /100 WBCS — HIGH (ref 0–0)
OB PNL STL: NEGATIVE — SIGNIFICANT CHANGE UP
PLATELET # BLD AUTO: 172 K/UL — SIGNIFICANT CHANGE UP (ref 150–400)
POTASSIUM SERPL-MCNC: 4.5 MMOL/L — SIGNIFICANT CHANGE UP (ref 3.5–5.3)
POTASSIUM SERPL-SCNC: 4.5 MMOL/L — SIGNIFICANT CHANGE UP (ref 3.5–5.3)
RBC # BLD: 1.87 M/UL — LOW (ref 4.2–5.8)
RBC # BLD: 1.87 M/UL — LOW (ref 4.2–5.8)
RBC # FLD: 20.7 % — HIGH (ref 10.3–14.5)
RETICS #: 112.4 K/UL — SIGNIFICANT CHANGE UP (ref 25–125)
RETICS/RBC NFR: 6 % — HIGH (ref 0.5–2.5)
SODIUM SERPL-SCNC: 141 MMOL/L — SIGNIFICANT CHANGE UP (ref 135–145)
WBC # BLD: 10.46 K/UL — SIGNIFICANT CHANGE UP (ref 3.8–10.5)
WBC # FLD AUTO: 10.46 K/UL — SIGNIFICANT CHANGE UP (ref 3.8–10.5)

## 2023-01-05 PROCEDURE — 99222 1ST HOSP IP/OBS MODERATE 55: CPT | Mod: GC

## 2023-01-05 PROCEDURE — 93356 MYOCRD STRAIN IMG SPCKL TRCK: CPT

## 2023-01-05 PROCEDURE — 99223 1ST HOSP IP/OBS HIGH 75: CPT

## 2023-01-05 PROCEDURE — 93306 TTE W/DOPPLER COMPLETE: CPT | Mod: 26

## 2023-01-05 PROCEDURE — 86077 PHYS BLOOD BANK SERV XMATCH: CPT

## 2023-01-05 RX ORDER — ACETAMINOPHEN 500 MG
975 TABLET ORAL ONCE
Refills: 0 | Status: COMPLETED | OUTPATIENT
Start: 2023-01-05 | End: 2023-01-05

## 2023-01-05 RX ORDER — TACROLIMUS 5 MG/1
3 CAPSULE ORAL EVERY 12 HOURS
Refills: 0 | Status: DISCONTINUED | OUTPATIENT
Start: 2023-01-06 | End: 2023-01-06

## 2023-01-05 RX ORDER — FOLIC ACID 0.8 MG
1 TABLET ORAL DAILY
Refills: 0 | Status: DISCONTINUED | OUTPATIENT
Start: 2023-01-05 | End: 2023-01-12

## 2023-01-05 RX ORDER — ATORVASTATIN CALCIUM 80 MG/1
10 TABLET, FILM COATED ORAL AT BEDTIME
Refills: 0 | Status: DISCONTINUED | OUTPATIENT
Start: 2023-01-05 | End: 2023-01-12

## 2023-01-05 RX ADMIN — TACROLIMUS 1 MILLIGRAM(S): 5 CAPSULE ORAL at 17:48

## 2023-01-05 RX ADMIN — Medication 975 MILLIGRAM(S): at 03:46

## 2023-01-05 RX ADMIN — TACROLIMUS 1 MILLIGRAM(S): 5 CAPSULE ORAL at 06:58

## 2023-01-05 RX ADMIN — ATORVASTATIN CALCIUM 10 MILLIGRAM(S): 80 TABLET, FILM COATED ORAL at 21:08

## 2023-01-05 RX ADMIN — Medication 40 MILLIGRAM(S): at 06:58

## 2023-01-05 RX ADMIN — PANTOPRAZOLE SODIUM 40 MILLIGRAM(S): 20 TABLET, DELAYED RELEASE ORAL at 08:35

## 2023-01-05 RX ADMIN — LOSARTAN POTASSIUM 100 MILLIGRAM(S): 100 TABLET, FILM COATED ORAL at 06:58

## 2023-01-05 RX ADMIN — Medication 200 MILLIGRAM(S): at 08:34

## 2023-01-05 NOTE — CONSULT NOTE ADULT - PROBLEM SELECTOR RECOMMENDATION 3
- adjust tacro back to home dose of Tacro 3 mg PO BID  - holding home prednisone as patient is on IV dexamethasone

## 2023-01-05 NOTE — CONSULT NOTE ADULT - PROBLEM SELECTOR RECOMMENDATION 9
- readmitted for Hgb of 6.5  - heme following and appreciate recommendations  - per Heme, patient will be given dexamethasone 40mg IV daily for 4 days (started 1/4)  - Check hemolysis labs daily - LDH, hapto, retic, fractionated bili, etc   - per heme, Avoid blood transfusion unless he is significantly symptomatic due to risk of hyperhemolysis. Of note if a transfusion is needed it is ok from heart transplant perspective   - plan to send blood smear

## 2023-01-05 NOTE — CONSULT NOTE ADULT - ATTENDING COMMENTS
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 There is no evidence of bleeding. He received PLEX, IVIG, RTXN in the past and currently has been on maintenance prednisone at 5 mg daily. WIll start him on pulse dose dexamethasone, monitor hemolysis labs, avoid transfusion as far as possible. All fluid should be administered at warm temperature.  Please supplement folate. Will follow up closely. 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 There is no evidence of bleeding. He received PLEX, IVIG, RTXN in the past and currently has been on maintenance prednisone at 5 mg daily. WIll start him on pulse dose dexamethasone, monitor hemolysis labs, send a cold agglutinin titer, avoid transfusion as far as possible. All fluid should be administered at warm temperature.  Please supplement folate. Will follow up closely.

## 2023-01-05 NOTE — CONSULT NOTE ADULT - ATTENDING COMMENTS
Pt. is a 72 y.o. M w/ PMHx of Hemolytic Anemia (Jacky positive-IgG + cold autoantibody), NICM s/p OHT on 2/23/18, Hx. 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 Nephrology consulted for history of CKD. Per [facility] Review Pt. since 2020 has had stage 3 CKD in the 1.4-1.6 range. Ptl admitted with SCr. of 1.7 which has trended to 1.6 today. Notable there has been a decrease in Hb from 11.5 to 6.5 over ~3 weeks. UA negative for hematuria, positive for mild proteinuria. Pt. denies any acute complaints. Alert, conversant , not SOB at rest but some LOBATO.  Knows he has renal failure and followed by nephrology    1.  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  No HD indication at this time  2.  Hemolytic anemia--likely high dose steroid rx should not alter 1 function although acute rise in BUN from catabolic effect.      discussed with med team  Darius Tariq MD  contact me on TEAMS

## 2023-01-05 NOTE — CONSULT NOTE ADULT - SUBJECTIVE AND OBJECTIVE BOX
[facility] DIVISION OF KIDNEY DISEASES AND HYPERTENSION -- [phone]  -- INITIAL CONSULT NOTE  --------------------------------------------------------------------------------  HPI:  Pt. is a 72 y.o. M w/ PMHx of Hemolytic Anemia (Jacky positive-IgG + cold autoantibody), NICM s/p OHT on 2/23/18, Hx. 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 Nephrology consulted for history of CKD. Per [facility] Review Pt. since 2020 has had stage 3 CKD in the 1.4-1.6 range. Ptl admitted with SCr. of 1.7 which has trended to 1.6 today. Notable there has been a decrease in Hb from 11.5 to 6.5 over ~3 weeks. UA negative for hematuria, positive for mild proteinuria.         PAST HISTORY  --------------------------------------------------------------------------------  PAST MEDICAL & SURGICAL HISTORY:  HTN      SVT (Supraventricular Tachycardia)      Non-Ischemic Cardiomyopathy  now s/p transplant 2018      GIB (gastrointestinal bleeding)      Hepatitis C virus      DVT of upper extremity (deep vein thrombosis)      Former smoker      HLD (hyperlipidemia)      Knee pain, right      H/O autoimmune hemolytic anemia      H/O hemolytic anemia      Status post left hip replacement      H/O heart transplant  2/2018        FAMILY HISTORY:    PAST SOCIAL HISTORY:    ALLERGIES & MEDICATIONS  --------------------------------------------------------------------------------  Allergies    No Known Allergies    Intolerances      Standing Inpatient Medications  dexAMETHasone     Tablet 40 milliGRAM(s) Oral daily  losartan 100 milliGRAM(s) Oral daily  metoprolol succinate  milliGRAM(s) Oral daily  pantoprazole    Tablet 40 milliGRAM(s) Oral before breakfast  tacrolimus 1 milliGRAM(s) Oral every 12 hours    PRN Inpatient Medications      REVIEW OF SYSTEMS  --------------------------------------------------------------------------------  As per HPI    VITALS/PHYSICAL EXAM  --------------------------------------------------------------------------------  T(C): 36.7 (01-05-23 @ 04:24), Max: 36.9 (01-04-23 @ 23:25)  HR: 110 (01-05-23 @ 08:30) (97 - 114)  BP: 120/80 (01-05-23 @ 08:30) (104/71 - 120/80)  RR: 18 (01-05-23 @ 04:24) (15 - 19)  SpO2: 97% (01-05-23 @ 04:24) (96% - 98%)  Wt(kg): --        Physical Exam:  	Gen: NAD  	HEENT: MMM  	Pulm: CTA B/L  	CV: S1S2  	Abd: Soft, +BS   	Ext: No LE edema B/L  	Neuro: Awake  	Skin: Warm and dry  	Vascular access:    LABS/STUDIES  --------------------------------------------------------------------------------              6.8    10.46 >-----------<  172      [01-05-23 @ 06:37]              19.2     141  |  105  |  45  ----------------------------<  167      [01-05-23 @ 06:37]  4.5   |  21  |  1.61        Ca     8.9     [01-05-23 @ 06:37]    TPro  x   /  Alb  x   /  TBili  6.1  /  DBili  0.7  /  AST  x   /  ALT  x   /  AlkPhos  x   [01-05-23 @ 06:37]    PT/INR: PT 12.7 , INR 1.10       [01-04-23 @ 14:43]  PTT: 18.2       [01-04-23 @ 14:43]          [01-04-23 @ 17:28]    Creatinine Trend:  SCr 1.61 [01-05 @ 06:37]  SCr 1.72 [01-04 @ 13:26]    Urinalysis - [01-04-23 @ 16:43]      Color Yellow / Appearance Clear / SG 1.021 / pH 6.0      Gluc Negative / Ketone Negative  / Bili Negative / Urobili Negative       Blood Negative / Protein 30 mg/dL / Leuk Est Negative / Nitrite Negative      RBC 1 / WBC 1 / Hyaline 1 / Gran  / Sq Epi  / Non Sq Epi 0 / Bacteria Negative      HbA1c 4.7      [11-07-18 @ 23:18]    HBsAg Nonreact      [04-25-18 @ 15:20]  HBcAb Nonreact      [04-25-18 @ 15:20]  HCV 0.21, Nonreact      [04-25-18 @ 15:20]  HIV Nonreact      [05-02-20 @ 11:28]    MIGUELINA: titer Negative, pattern --      [05-23-20 @ 12:48]  C3 Complement 135      [03-19-18 @ 15:13]  C4 Complement 37      [03-19-18 @ 15:13]  Rheumatoid Factor <10      [05-23-20 @ 12:21]  ANCA: cANCA Negative, pANCA Negative, atypical ANCA Indeterminate      [05-23-20 @ 12:48]  Free Light Chains: kappa 4.36, lambda 5.71, ratio = 0.76      [03-19 @ 15:13]  Immunofixation Serum:   No Monoclonal Band Identified      [06-15-18 @ 09:48]  SPEP Interpretation: Normal Electrophoresis Pattern      [06-15-18 @ 09:35]  Cryoglobulin: Negative      [03-19-18 @ 15:13]   [facility] DIVISION OF KIDNEY DISEASES AND HYPERTENSION -- [phone]  -- INITIAL CONSULT NOTE  --------------------------------------------------------------------------------  HPI:  Pt. is a 72 y.o. M w/ PMHx of Hemolytic Anemia (Jacky positive-IgG + cold autoantibody), NICM s/p OHT on 2/23/18, Hx. 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 Nephrology consulted for history of CKD. Per [facility] Review Pt. since 2020 has had stage 3 CKD in the 1.4-1.6 range. Ptl admitted with SCr. of 1.7 which has trended to 1.6 today. Notable there has been a decrease in Hb from 11.5 to 6.5 over ~3 weeks. UA negative for hematuria, positive for mild proteinuria. Pt. denies any acute complaints.       PAST HISTORY  --------------------------------------------------------------------------------  PAST MEDICAL & SURGICAL HISTORY:  HTN      SVT (Supraventricular Tachycardia)      Non-Ischemic Cardiomyopathy  now s/p transplant 2018      GIB (gastrointestinal bleeding)      Hepatitis C virus      DVT of upper extremity (deep vein thrombosis)      Former smoker      HLD (hyperlipidemia)      Knee pain, right      H/O autoimmune hemolytic anemia      H/O hemolytic anemia      Status post left hip replacement      H/O heart transplant  2/2018        FAMILY HISTORY:    PAST SOCIAL HISTORY:    ALLERGIES & MEDICATIONS  --------------------------------------------------------------------------------  Allergies    No Known Allergies    Intolerances      Standing Inpatient Medications  dexAMETHasone     Tablet 40 milliGRAM(s) Oral daily  losartan 100 milliGRAM(s) Oral daily  metoprolol succinate  milliGRAM(s) Oral daily  pantoprazole    Tablet 40 milliGRAM(s) Oral before breakfast  tacrolimus 1 milliGRAM(s) Oral every 12 hours    PRN Inpatient Medications      REVIEW OF SYSTEMS  --------------------------------------------------------------------------------  As per HPI    VITALS/PHYSICAL EXAM  --------------------------------------------------------------------------------  T(C): 36.7 (01-05-23 @ 04:24), Max: 36.9 (01-04-23 @ 23:25)  HR: 110 (01-05-23 @ 08:30) (97 - 114)  BP: 120/80 (01-05-23 @ 08:30) (104/71 - 120/80)  RR: 18 (01-05-23 @ 04:24) (15 - 19)  SpO2: 97% (01-05-23 @ 04:24) (96% - 98%)  Wt(kg): --      Physical Exam:  	Gen: NAD  	HEENT: MMM  	Pulm: CTA B/L  	CV: S1S2  	Abd: Soft, +BS   	Ext: No LE edema B/L  	Neuro: Awake  	Skin: Warm and dry  	Vascular access: Peripheral     LABS/STUDIES  --------------------------------------------------------------------------------              6.8    10.46 >-----------<  172      [01-05-23 @ 06:37]              19.2     141  |  105  |  45  ----------------------------<  167      [01-05-23 @ 06:37]  4.5   |  21  |  1.61        Ca     8.9     [01-05-23 @ 06:37]    TPro  x   /  Alb  x   /  TBili  6.1  /  DBili  0.7  /  AST  x   /  ALT  x   /  AlkPhos  x   [01-05-23 @ 06:37]    PT/INR: PT 12.7 , INR 1.10       [01-04-23 @ 14:43]  PTT: 18.2       [01-04-23 @ 14:43]          [01-04-23 @ 17:28]    Creatinine Trend:  SCr 1.61 [01-05 @ 06:37]  SCr 1.72 [01-04 @ 13:26]    Urinalysis - [01-04-23 @ 16:43]      Color Yellow / Appearance Clear / SG 1.021 / pH 6.0      Gluc Negative / Ketone Negative  / Bili Negative / Urobili Negative       Blood Negative / Protein 30 mg/dL / Leuk Est Negative / Nitrite Negative      RBC 1 / WBC 1 / Hyaline 1 / Gran  / Sq Epi  / Non Sq Epi 0 / Bacteria Negative      HbA1c 4.7      [11-07-18 @ 23:18]    HBsAg Nonreact      [04-25-18 @ 15:20]  HBcAb Nonreact      [04-25-18 @ 15:20]  HCV 0.21, Nonreact      [04-25-18 @ 15:20]  HIV Nonreact      [05-02-20 @ 11:28]    MIGUELINA: titer Negative, pattern --      [05-23-20 @ 12:48]  C3 Complement 135      [03-19-18 @ 15:13]  C4 Complement 37      [03-19-18 @ 15:13]  Rheumatoid Factor <10      [05-23-20 @ 12:21]  ANCA: cANCA Negative, pANCA Negative, atypical ANCA Indeterminate      [05-23-20 @ 12:48]  Free Light Chains: kappa 4.36, lambda 5.71, ratio = 0.76      [03-19 @ 15:13]  Immunofixation Serum:   No Monoclonal Band Identified      [06-15-18 @ 09:48]  SPEP Interpretation: Normal Electrophoresis Pattern      [06-15-18 @ 09:35]  Cryoglobulin: Negative      [03-19-18 @ 15:13]

## 2023-01-05 NOTE — CONSULT NOTE ADULT - ASSESSMENT
BILLY RUSSELL is a 72y Male with a past medical history as described above who presented for evaluation of low hemoglobin on CBC. Being treated with steroids by Dr. Garcia at [facility] for mixed autoimmune hemolytic anemia. Hematology consulted due to concern for AIHA flare.     # Mixed Autoimmune Hemolytic Anemia  - Spoke with Dr. Garcia, will pulse steroids with dexamethasone 40mg IV daily for 4 days. This is day 2  - Check hemolysis labs daily - LDH, hapto, retic, fractionated bili, etc   - Avoid blood transfusion unless he is significantly symptomatic due to risk of hyperhemolysis  - Will review smear     Fabian Moreau MD, PGY-4  Hematology/Medical Oncology Fellow  Pager: [phone]  Available on [employer] Teams  After 5pm or on weekends please contact  to page on-call fellow    BILLY RUSSELL is a 72y Male with a past medical history as described above who presented for evaluation of low hemoglobin on CBC. Being treated with steroids by Dr. Garcia at [facility] for mixed autoimmune hemolytic anemia. Hematology consulted due to concern for AIHA flare.     # Mixed Autoimmune Hemolytic Anemia  - Pulse steroids with dexamethasone 40mg IV daily for 4 days. This is day 2  - Check hemolysis labs daily - LDH, hapto, retic, fractionated bili, etc   - Avoid blood transfusion unless he is significantly symptomatic due to risk of hyperhemolysis  - Daily CBC  - Will review smear     Fabian Moreau MD, PGY-4  Hematology/Medical Oncology Fellow  Pager: [phone]  Available on [employer] Teams  After 5pm or on weekends please contact  to page on-call fellow    BILLY RUSSELL is a 72y Male with a past medical history as described above who presented for evaluation of low hemoglobin on CBC. Being treated with steroids by Dr. Garcia at [facility] for mixed autoimmune hemolytic anemia. Hematology consulted due to concern for AIHA flare.     # Mixed Autoimmune Hemolytic Anemia  - Pulse steroids with dexamethasone 40mg IV daily for 4 days. This is day 2/4.  - Check hemolysis labs daily - LDH, hapto, retic, fractionated bili, etc   - Avoid blood transfusion unless he is significantly symptomatic due to risk of hyperhemolysis. If he does need blood, please make sure it is appropriately cross matched and warmed.   - Folic acid 1mg PO daily   - Daily CBC  - Will review smear     Fabian Moreau MD, PGY-4  Hematology/Medical Oncology Fellow  Pager: [phone]  Available on [employer] Teams  After 5pm or on weekends please contact  to page on-call fellow

## 2023-01-05 NOTE — PROGRESS NOTE ADULT - ASSESSMENT
70 male h/o hemolytic anemia, NICM s/p heart transplnt, hep c, here with anemia    hemolytic anemia  heme consulted  40mg PO dexamethasone x4 days. If no response, will consider IVIG/Rituxan  - Hemolysis labs daily  hold transfusion unless symptomatic    s/p heart transplant  cont transplant meds  cont home cardiac meds  transplant cardiology f/u  repeat echo unchanged    ckd  stable  renal following      Advanced care planning was discussed with patient and family.  Advanced care planning forms were reviewed and discussed as appropriate.  Differential diagnosis and plan of care discussed with patient after the evaluation.   Pain assessed and judicious use of narcotics when appropriate was discussed.  Importance of Fall prevention discussed.  Counseling on Smoking and Alcohol cessation was offered when appropriate.  Counseling on Diet, exercise, and medication compliance was done.   Approx 30 minutes spent.

## 2023-01-05 NOTE — CONSULT NOTE ADULT - PROBLEM SELECTOR RECOMMENDATION 9
Pt. admitted with SCr. of 1.7 which has trended to 1.6 today. Baseline SCr. since 2020 has been 1.4-1.6. Transfuse as per Heme/Onc. Acute drop more likely related to history of hemolytic anemia and less likely to CKD. Optimize hemodynamics. Monitor BMP daily. Monitor UOP. Continue ARB for now. Monitor Tacro level. No objection to IVF for volume resuscitation. Agree with Telemetry monitoring.       If you have any questions, please feel free to contact me  Edil Terrell  Nephrology Fellow  [phone]; Prefer [employer] TEAMS  (After 5pm or on weekends please page the on-call fellow) Pt. admitted with SCr. of 1.7 which has trended to 1.6 today. Baseline SCr. since 2020 has been 1.4-1.6. Transfuse as per Heme/Onc. Acute drop more likely related to history of hemolytic anemia and less likely to CKD. Haptoglobin low. Optimize hemodynamics. Monitor BMP daily. Monitor UOP. Continue ARB for now. Monitor Tacro level. No objection to IVF for volume resuscitation. Agree with Telemetry monitoring.     If you have any questions, please feel free to contact me  Edil Terrell  Nephrology Fellow  [phone]; Prefer [employer] TEAMS  (After 5pm or on weekends please page the on-call fellow)

## 2023-01-05 NOTE — CONSULT NOTE ADULT - PROBLEM SELECTOR RECOMMENDATION 4
- upon admission was noted to have NORMAN of 1.7 (in Feb 2022 creatinine 1.18)  - nephrology following

## 2023-01-05 NOTE — CONSULT NOTE ADULT - SUBJECTIVE AND OBJECTIVE BOX
ADVANCED HEART FAILURE & TRANSPLANT- CONSULT NOTE  *To reach the NS1 Team from 8am to 5pm, please call [phone].  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 Drop of Hb from 11.5 to 6.5 in ~3 weeks.     Pt is AOx3 but poor historian; unable to provide more information on why he was sent in. Pt endorse more exertional fatigue and sob in the last few days. Denies bleeding, hematuria, dark tarry stools. Pt had one prior colonoscopy "a long time ago". Pt had normal brown BM this AM      PAST MEDICAL & SURGICAL HISTORY:  HTN      SVT (Supraventricular Tachycardia)      Non-Ischemic Cardiomyopathy  now s/p transplant 2018      GIB (gastrointestinal bleeding)      Hepatitis C virus      DVT of upper extremity (deep vein thrombosis)      Former smoker      HLD (hyperlipidemia)      Knee pain, right      H/O autoimmune hemolytic anemia      H/O hemolytic anemia      Status post left hip replacement      H/O heart transplant  2/2018      Allergies    No Known Allergies    Intolerances          FAMILY HISTORY:    Home Medications:  Aspirin Enteric Coated 81 mg oral delayed release tablet: 1 tab(s) orally once a day (04 Jan 2023 18:05)  folic acid 1 mg oral tablet: 1 tab(s) orally once a day (04 Jan 2023 18:05)  losartan 100 mg oral tablet: 1 tab(s) orally once a day (04 Jan 2023 18:05)  metoprolol succinate 200 mg oral tablet, extended release: 1 tab(s) orally once a day (04 Jan 2023 18:05)  pantoprazole 40 mg oral delayed release tablet: 1 tab(s) orally once a day (04 Jan 2023 18:05)  pravastatin 20 mg oral tablet: 1 tab(s) orally once a day (04 Jan 2023 18:05)  predniSONE 5 mg oral tablet: 1 tab(s) orally once a day (04 Jan 2023 18:05)  tacrolimus 1 mg oral capsule: 1 cap(s) orally every 12 hours (04 Jan 2023 18:05)  Veltassa 8.4 g oral powder for reconstitution: 1 unit(s) orally once a day (04 Jan 2023 18:05)    Medications:  dexAMETHasone     Tablet 40 milliGRAM(s) Oral daily  losartan 100 milliGRAM(s) Oral daily  metoprolol succinate  milliGRAM(s) Oral daily  pantoprazole    Tablet 40 milliGRAM(s) Oral before breakfast  tacrolimus 1 milliGRAM(s) Oral every 12 hours      Vitals:  Vital Signs Last 24 Hours  T(C): 36.6 (01-05-23 @ 12:35), Max: 36.9 (01-04-23 @ 23:25)  HR: 117 (01-05-23 @ 12:35) (97 - 117)  BP: 118/66 (01-05-23 @ 12:35) (104/71 - 120/80)  RR: 18 (01-05-23 @ 12:35) (15 - 19)  SpO2: 98% (01-05-23 @ 12:35) (96% - 98%)        I&O's Summary      Physical Exam  General: No distress. Comfortable.  HEENT: noted to have juandice eyes   Neck: JVP ~ 8  Chest: Clear to auscultation bilaterally  CV: Normal S1 and S2. No murmurs, rub, or gallops. Radial pulses normal.  Abdomen: Soft, non-distended, non-tender  Extremities: warm and well perfused  Neurology: Alert and oriented times three    Labs:                        6.8    10.46 )-----------( 172      ( 05 Jan 2023 06:37 )             19.2     01-05    141  |  105  |  45<H>  ----------------------------<  167<H>  4.5   |  21<L>  |  1.61<H>    Ca    8.9      05 Jan 2023 06:37    TPro  x   /  Alb  x   /  TBili  6.1<H>  /  DBili  0.7<H>  /  AST  x   /  ALT  x   /  AlkPhos  x   01-05    PT/INR - ( 04 Jan 2023 14:43 )   PT: 12.7 sec;   INR: 1.10 ratio         PTT - ( 04 Jan 2023 14:43 )  PTT:18.2 sec          Lactate Dehydrogenase, Serum: 858 U/L (01-04 @ 17:28)  Lactate Dehydrogenase, Serum: 696 U/L (01-04 @ 13:26)          Imaging Studies    < from: Transthoracic Echocardiogram (07.25.22 @ 09:21) >  Dimensions:    Normal Values:  LA:     4.0    2.0 - 4.0 cm  Ao:     4.0    2.0 - 3.8 cm  SEPTUM: 1.40.6 - 1.2 cm  PWT:    1.0    0.6 - 1.1 cm  LVIDd:  4.5    3.0 - 5.6 cm  LVIDs:  3.2    1.8 - 4.0 cm  Derived variables:  LVMI: 103 g/m2  RWT: 0.44  Fractional short: 29 %  EF (Sherman Rule): 60 %Doppler Peak Velocity (m/sec):  AoV=0.8  ------------------------------------------------------------------------  Observations:  Mitral Valve: Normal mitral valve. Mild mitral  regurgitation.  Aortic Valve/Aorta: Normal trileaflet aortic valve. Peak  transaortic valve gradient equals 3 mm Hg, mean transaortic  valve gradient equals 1 mm Hg. Peak left ventricular  outflow tract gradient equals 3 mm Hg, mean gradient is  equal to 1 mm Hg, LVOT velocity time integral equals 16 cm.  Aortic Root: 4 cm.  Left Atrium: Normal left atrium.  Left Ventricle: Normal left ventricular systolic function.  No segmental wall motion abnormalities. Normal left  ventricular internal dimensions and wall thicknesses.  Normal diastolic function  Right Heart: Normal right atrium. Normal right ventricular  size with decreased right ventricular systolic function.  Normal tricuspid valve. Mild tricuspid regurgitation.  Normal pulmonic valve.  Pericardium/Pleura: Normal pericardium with no pericardial  effusion.  Hemodynamic: Estimated right atrial pressure is 8 mm Hg.  Estimated right ventricular systolic pressure equals 33 mm  Hg, assuming right atrial pressure equals 8 mm Hg,  consistent with normal pulmonary pressures.  ------------------------------------------------------------------------  Conclusions:  1. Normal left ventricular internal dimensions and wall  thicknesses.  2. Normal left ventricular systolic function. No segmental  wall motion abnormalities.  3. Strain imaging was performed with a HR of 79bpm and a BP  of 143/80 . Global L. Strain= -17.1%.  4. Normal diastolic function  5. Normal right ventricular size with decreased right  ventricular systolic function.    < end of copied text >              ADVANCED HEART FAILURE & TRANSPLANT- CONSULT NOTE  *To reach the NS1 Team from 8am to 5pm, please call [phone].  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 Drop of Hb from 11.5 to 6.5 in ~3 weeks.     Pt is AOx3 but poor historian; unable to provide more information on why he was sent in. Pt endorse more exertional fatigue and sob in the last few days. Denies bleeding, hematuria, dark tarry stools. Pt had one prior colonoscopy "a long time ago". Pt had normal brown BM this AM      PAST MEDICAL & SURGICAL HISTORY:  HTN      SVT (Supraventricular Tachycardia)      Non-Ischemic Cardiomyopathy  now s/p transplant 2018      GIB (gastrointestinal bleeding)      Hepatitis C virus      DVT of upper extremity (deep vein thrombosis)      Former smoker      HLD (hyperlipidemia)      Knee pain, right      H/O autoimmune hemolytic anemia      H/O hemolytic anemia      Status post left hip replacement      H/O heart transplant  2/2018      Allergies    No Known Allergies    Intolerances          FAMILY HISTORY:    Home Medications:  Aspirin Enteric Coated 81 mg oral delayed release tablet: 1 tab(s) orally once a day (04 Jan 2023 18:05)  folic acid 1 mg oral tablet: 1 tab(s) orally once a day (04 Jan 2023 18:05)  losartan 100 mg oral tablet: 1 tab(s) orally once a day (04 Jan 2023 18:05)  metoprolol succinate 200 mg oral tablet, extended release: 1 tab(s) orally once a day (04 Jan 2023 18:05)  pantoprazole 40 mg oral delayed release tablet: 1 tab(s) orally once a day (04 Jan 2023 18:05)  pravastatin 20 mg oral tablet: 1 tab(s) orally once a day (04 Jan 2023 18:05)  predniSONE 5 mg oral tablet: 1 tab(s) orally once a day (04 Jan 2023 18:05)  tacrolimus 1 mg oral capsule: 1 cap(s) orally every 12 hours (04 Jan 2023 18:05)  Veltassa 8.4 g oral powder for reconstitution: 1 unit(s) orally once a day (04 Jan 2023 18:05)    Medications:  dexAMETHasone     Tablet 40 milliGRAM(s) Oral daily  losartan 100 milliGRAM(s) Oral daily  metoprolol succinate  milliGRAM(s) Oral daily  pantoprazole    Tablet 40 milliGRAM(s) Oral before breakfast  tacrolimus 1 milliGRAM(s) Oral every 12 hours      Vitals:  Vital Signs Last 24 Hours  T(C): 36.6 (01-05-23 @ 12:35), Max: 36.9 (01-04-23 @ 23:25)  HR: 117 (01-05-23 @ 12:35) (97 - 117)  BP: 118/66 (01-05-23 @ 12:35) (104/71 - 120/80)  RR: 18 (01-05-23 @ 12:35) (15 - 19)  SpO2: 98% (01-05-23 @ 12:35) (96% - 98%)        I&O's Summary      Physical Exam  General: No distress. Comfortable.  HEENT: noted to have juandice eyes and under tongue  Neck: JVP ~ 8  Chest: Clear to auscultation bilaterally  CV: Normal S1 and S2. No murmurs, rub, or gallops. Radial pulses normal.  Abdomen: Soft, non-distended, non-tender  Extremities: warm and well perfused  Neurology: Alert and oriented times three    Labs:                        6.8    10.46 )-----------( 172      ( 05 Jan 2023 06:37 )             19.2     01-05    141  |  105  |  45<H>  ----------------------------<  167<H>  4.5   |  21<L>  |  1.61<H>    Ca    8.9      05 Jan 2023 06:37    TPro  x   /  Alb  x   /  TBili  6.1<H>  /  DBili  0.7<H>  /  AST  x   /  ALT  x   /  AlkPhos  x   01-05    PT/INR - ( 04 Jan 2023 14:43 )   PT: 12.7 sec;   INR: 1.10 ratio         PTT - ( 04 Jan 2023 14:43 )  PTT:18.2 sec          Lactate Dehydrogenase, Serum: 858 U/L (01-04 @ 17:28)  Lactate Dehydrogenase, Serum: 696 U/L (01-04 @ 13:26)          Imaging Studies    < from: Transthoracic Echocardiogram (07.25.22 @ 09:21) >  Dimensions:    Normal Values:  LA:     4.0    2.0 - 4.0 cm  Ao:     4.0    2.0 - 3.8 cm  SEPTUM: 1.40.6 - 1.2 cm  PWT:    1.0    0.6 - 1.1 cm  LVIDd:  4.5    3.0 - 5.6 cm  LVIDs:  3.2    1.8 - 4.0 cm  Derived variables:  LVMI: 103 g/m2  RWT: 0.44  Fractional short: 29 %  EF (Sherman Rule): 60 %Doppler Peak Velocity (m/sec):  AoV=0.8  ------------------------------------------------------------------------  Observations:  Mitral Valve: Normal mitral valve. Mild mitral  regurgitation.  Aortic Valve/Aorta: Normal trileaflet aortic valve. Peak  transaortic valve gradient equals 3 mm Hg, mean transaortic  valve gradient equals 1 mm Hg. Peak left ventricular  outflow tract gradient equals 3 mm Hg, mean gradient is  equal to 1 mm Hg, LVOT velocity time integral equals 16 cm.  Aortic Root: 4 cm.  Left Atrium: Normal left atrium.  Left Ventricle: Normal left ventricular systolic function.  No segmental wall motion abnormalities. Normal left  ventricular internal dimensions and wall thicknesses.  Normal diastolic function  Right Heart: Normal right atrium. Normal right ventricular  size with decreased right ventricular systolic function.  Normal tricuspid valve. Mild tricuspid regurgitation.  Normal pulmonic valve.  Pericardium/Pleura: Normal pericardium with no pericardial  effusion.  Hemodynamic: Estimated right atrial pressure is 8 mm Hg.  Estimated right ventricular systolic pressure equals 33 mm  Hg, assuming right atrial pressure equals 8 mm Hg,  consistent with normal pulmonary pressures.  ------------------------------------------------------------------------  Conclusions:  1. Normal left ventricular internal dimensions and wall  thicknesses.  2. Normal left ventricular systolic function. No segmental  wall motion abnormalities.  3. Strain imaging was performed with a HR of 79bpm and a BP  of 143/80 . Global L. Strain= -17.1%.  4. Normal diastolic function  5. Normal right ventricular size with decreased right  ventricular systolic function.    < end of copied text >

## 2023-01-05 NOTE — CONSULT NOTE ADULT - ASSESSMENT
73 y/o male with hx of nonischemic cardiomyopathy s/p HM2 LVAD in June 2017 complicated by recurrent GI hemorrhage and possible pump thrombosis who underwent OHT 2/23/18 with hepatitis C donor, hx of hemolytic anemia (treated with prednisone and Rituximab) presented to [facility] ER after seeing his PCP for Hgb of 6.5 and new onset of juandice in his eyes. Upon arrival he was noted to have low Hgb, heme following and currently being treated with IV dexamethasone. From a heart transplant perspective he is doing well and remains hemodynamically stable. He appear euvolemic on exam, however renal function is slightly above baseline. Will adjust his immunosuppression as needed.

## 2023-01-05 NOTE — CONSULT NOTE ADULT - NS ATTEND AMEND GEN_ALL_CORE FT
Patient was seen and examined at bedside with the advanced care provider.    Briefly, Mr. Baez is a 72 yo M with previous NICM s/p LVAD HM2 with explant and OTH on 2/3/2018 from Hep C donor c/b renal failure and rejection as well as hemolytic anemia in 2020 requiring pulse dose steroids and rituximab.  He now presents from this hematology office for Hb of 6.7 with concerns for AIHA.  Started on pulse dose steroids.  On exam he appears euvolemic.  Last echocardiogram was in Nov 22 with normal bi-ventricular function.  He is due for his annual RHC/BX in February.  Please obtain echocardiogram.  Will continue on home tacrolimus 3mg BID.  Obtain daily tacrolimus trough level.  Transfusion plan and steroid management per hematology.  Of note, he was on prednisone 5mg daily at home for his transplant immunosuppression.  He is currently being covered with IV dexamethasone but will need to be placed back on prednisone after taper.

## 2023-01-05 NOTE — CONSULT NOTE ADULT - SUBJECTIVE AND OBJECTIVE BOX
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 Drop of Hb from 11.5 to 6.5 in ~3 weeks.     Pt is AOx3 but poor historian; unable to provide more information on why he was sent in. Pt endorse more exertional fatigue and sob in the last few days. Denies bleeding, hematuria, dark tarry stools. Pt had one prior colonoscopy "a long time ago". Pt had normal brown BM this AM (04 Jan 2023 21:44)       ROS negative except as indicated in the HPI.    PAST MEDICAL & SURGICAL HISTORY:  HTN      SVT (Supraventricular Tachycardia)      Non-Ischemic Cardiomyopathy  now s/p transplant 2018      GIB (gastrointestinal bleeding)      Hepatitis C virus      DVT of upper extremity (deep vein thrombosis)      Former smoker      HLD (hyperlipidemia)      Knee pain, right      H/O autoimmune hemolytic anemia      H/O hemolytic anemia      Status post left hip replacement      H/O heart transplant  2/2018          SOCIAL HISTORY:    FAMILY HISTORY:      MEDICATIONS  (STANDING):  dexAMETHasone     Tablet 40 milliGRAM(s) Oral daily  losartan 100 milliGRAM(s) Oral daily  metoprolol succinate  milliGRAM(s) Oral daily  pantoprazole    Tablet 40 milliGRAM(s) Oral before breakfast  tacrolimus 1 milliGRAM(s) Oral every 12 hours    MEDICATIONS  (PRN):      Allergies    No Known Allergies    Intolerances        Vital Signs Last 24 Hrs  T(C): 36.7 (05 Jan 2023 04:24), Max: 36.9 (04 Jan 2023 23:25)  T(F): 98.1 (05 Jan 2023 04:24), Max: 98.5 (04 Jan 2023 23:25)  HR: 110 (05 Jan 2023 08:30) (97 - 114)  BP: 120/80 (05 Jan 2023 08:30) (104/71 - 120/80)  BP(mean): --  RR: 18 (05 Jan 2023 04:24) (15 - 19)  SpO2: 97% (05 Jan 2023 04:24) (96% - 98%)    Parameters below as of 05 Jan 2023 04:24  Patient On (Oxygen Delivery Method): room air        PHYSICAL EXAM  General: adult in NAD  HEENT: clear oropharynx, anicteric sclera, pink conjunctiva  Neck: supple  CV: normal S1/S2 with no murmur rubs or gallops  Lungs: positive air movement b/l ant lungs, clear to auscultation, no wheezes, no rales  Abdomen: soft non-tender non-distended, no hepatosplenomegaly  Ext: no clubbing cyanosis or edema  Skin: no rashes and no petechiae  Neuro: alert and oriented X 4, no focal deficits      LABS:                          6.8    10.46 )-----------( 172      ( 05 Jan 2023 06:37 )             19.2         Mean Cell Volume : 102.7 fl  Mean Cell Hemoglobin : 36.4 pg  Mean Cell Hemoglobin Concentration : 35.4 gm/dL  Auto Neutrophil # : x  Auto Lymphocyte # : x  Auto Monocyte # : x  Auto Eosinophil # : x  Auto Basophil # : x  Auto Neutrophil % : x  Auto Lymphocyte % : x  Auto Monocyte % : x  Auto Eosinophil % : x  Auto Basophil % : x      01-05    141  |  105  |  45<H>  ----------------------------<  167<H>  4.5   |  21<L>  |  1.61<H>    Ca    8.9      05 Jan 2023 06:37    TPro  x   /  Alb  x   /  TBili  6.1<H>  /  DBili  0.7<H>  /  AST  x   /  ALT  x   /  AlkPhos  x   01-05          PT/INR - ( 04 Jan 2023 14:43 )   PT: 12.7 sec;   INR: 1.10 ratio         PTT - ( 04 Jan 2023 14:43 )  PTT:18.2 sec    Reticulocyte Percent: 6.0 % (01-05 @ 06:37)  Reticulocyte Percent: 5.0 % (01-04 @ 17:28)

## 2023-01-05 NOTE — CONSULT NOTE ADULT - PROBLEM SELECTOR RECOMMENDATION 2
- s/p OHT in 2/23/18  - continue home Toprol  mg PO QD and Losartan 100 mg PO QD  - can resume pravastatin 20 mg PO QD  - holding ASA at this time   - resume home pantoprazole 40 mg PO QD    - schedule to undergo annual RHC/EMBx with Dr. Carpenter in February  - Please repeat ECHO today

## 2023-01-05 NOTE — CONSULT NOTE ADULT - ASSESSMENT
Pt. is a 72 y.o. M w/ PMHx of Hemolytic Anemia (Jacky positive-IgG + cold autoantibody), NICM s/p OHT on 2/23/18, Hx. 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 Nephrology consulted for history of CKD.

## 2023-01-06 ENCOUNTER — RX RENEWAL (OUTPATIENT)
Age: 73
End: 2023-01-06

## 2023-01-06 LAB
ALBUMIN SERPL ELPH-MCNC: 4.1 G/DL — SIGNIFICANT CHANGE UP (ref 3.3–5)
ALP SERPL-CCNC: 53 U/L — SIGNIFICANT CHANGE UP (ref 40–120)
ALT FLD-CCNC: 12 U/L — SIGNIFICANT CHANGE UP (ref 10–45)
ANION GAP SERPL CALC-SCNC: 13 MMOL/L — SIGNIFICANT CHANGE UP (ref 5–17)
AST SERPL-CCNC: 28 U/L — SIGNIFICANT CHANGE UP (ref 10–40)
BILIRUB DIRECT SERPL-MCNC: 0.5 MG/DL — HIGH (ref 0–0.3)
BILIRUB INDIRECT FLD-MCNC: 5 MG/DL — HIGH (ref 0.2–1)
BILIRUB SERPL-MCNC: 5.5 MG/DL — HIGH (ref 0.2–1.2)
BUN SERPL-MCNC: 53 MG/DL — HIGH (ref 7–23)
CALCIUM SERPL-MCNC: 8.8 MG/DL — SIGNIFICANT CHANGE UP (ref 8.4–10.5)
CHLORIDE SERPL-SCNC: 106 MMOL/L — SIGNIFICANT CHANGE UP (ref 96–108)
CO2 SERPL-SCNC: 22 MMOL/L — SIGNIFICANT CHANGE UP (ref 22–31)
CREAT SERPL-MCNC: 1.88 MG/DL — HIGH (ref 0.5–1.3)
EGFR: 37 ML/MIN/1.73M2 — LOW
GLUCOSE SERPL-MCNC: 102 MG/DL — HIGH (ref 70–99)
HCT VFR BLD CALC: 18.2 % — CRITICAL LOW (ref 39–50)
HGB BLD-MCNC: 6 G/DL — CRITICAL LOW (ref 13–17)
LDH SERPL L TO P-CCNC: 671 U/L — HIGH (ref 50–242)
MCHC RBC-ENTMCNC: 33 GM/DL — SIGNIFICANT CHANGE UP (ref 32–36)
MCHC RBC-ENTMCNC: 33.5 PG — SIGNIFICANT CHANGE UP (ref 27–34)
MCV RBC AUTO: 101.7 FL — HIGH (ref 80–100)
NRBC # BLD: 1 /100 WBCS — HIGH (ref 0–0)
PLATELET # BLD AUTO: 197 K/UL — SIGNIFICANT CHANGE UP (ref 150–400)
POTASSIUM SERPL-MCNC: 4.6 MMOL/L — SIGNIFICANT CHANGE UP (ref 3.5–5.3)
POTASSIUM SERPL-SCNC: 4.6 MMOL/L — SIGNIFICANT CHANGE UP (ref 3.5–5.3)
PROT SERPL-MCNC: 6.8 G/DL — SIGNIFICANT CHANGE UP (ref 6–8.3)
RBC # BLD: 1.79 M/UL — LOW (ref 4.2–5.8)
RBC # FLD: 20.7 % — HIGH (ref 10.3–14.5)
SODIUM SERPL-SCNC: 141 MMOL/L — SIGNIFICANT CHANGE UP (ref 135–145)
WBC # BLD: 16.46 K/UL — HIGH (ref 3.8–10.5)
WBC # FLD AUTO: 16.46 K/UL — HIGH (ref 3.8–10.5)

## 2023-01-06 PROCEDURE — 99232 SBSQ HOSP IP/OBS MODERATE 35: CPT | Mod: GC

## 2023-01-06 RX ORDER — IMMUNE GLOBULIN (HUMAN) 10 G/100ML
70 INJECTION INTRAVENOUS; SUBCUTANEOUS ONCE
Refills: 0 | Status: COMPLETED | OUTPATIENT
Start: 2023-01-06 | End: 2023-01-06

## 2023-01-06 RX ORDER — ACETAMINOPHEN 500 MG
650 TABLET ORAL ONCE
Refills: 0 | Status: COMPLETED | OUTPATIENT
Start: 2023-01-07 | End: 2023-01-07

## 2023-01-06 RX ORDER — TACROLIMUS 5 MG/1
3 CAPSULE ORAL
Refills: 0 | Status: DISCONTINUED | OUTPATIENT
Start: 2023-01-06 | End: 2023-01-07

## 2023-01-06 RX ORDER — DIPHENHYDRAMINE HCL 50 MG
25 CAPSULE ORAL ONCE
Refills: 0 | Status: COMPLETED | OUTPATIENT
Start: 2023-01-07 | End: 2023-01-07

## 2023-01-06 RX ORDER — IMMUNE GLOBULIN (HUMAN) 10 G/100ML
70 INJECTION INTRAVENOUS; SUBCUTANEOUS ONCE
Refills: 0 | Status: COMPLETED | OUTPATIENT
Start: 2023-01-07 | End: 2023-01-07

## 2023-01-06 RX ORDER — POLYETHYLENE GLYCOL 3350 17 G/17G
17 POWDER, FOR SOLUTION ORAL DAILY
Refills: 0 | Status: DISCONTINUED | OUTPATIENT
Start: 2023-01-06 | End: 2023-01-12

## 2023-01-06 RX ADMIN — Medication 200 MILLIGRAM(S): at 05:46

## 2023-01-06 RX ADMIN — Medication 40 MILLIGRAM(S): at 05:45

## 2023-01-06 RX ADMIN — Medication 1 MILLIGRAM(S): at 11:44

## 2023-01-06 RX ADMIN — POLYETHYLENE GLYCOL 3350 17 GRAM(S): 17 POWDER, FOR SOLUTION ORAL at 09:59

## 2023-01-06 RX ADMIN — LOSARTAN POTASSIUM 100 MILLIGRAM(S): 100 TABLET, FILM COATED ORAL at 05:46

## 2023-01-06 RX ADMIN — TACROLIMUS 3 MILLIGRAM(S): 5 CAPSULE ORAL at 20:06

## 2023-01-06 RX ADMIN — ATORVASTATIN CALCIUM 10 MILLIGRAM(S): 80 TABLET, FILM COATED ORAL at 21:56

## 2023-01-06 RX ADMIN — TACROLIMUS 3 MILLIGRAM(S): 5 CAPSULE ORAL at 05:46

## 2023-01-06 RX ADMIN — IMMUNE GLOBULIN (HUMAN) 116.67 GRAM(S): 10 INJECTION INTRAVENOUS; SUBCUTANEOUS at 16:23

## 2023-01-06 RX ADMIN — PANTOPRAZOLE SODIUM 40 MILLIGRAM(S): 20 TABLET, DELAYED RELEASE ORAL at 05:46

## 2023-01-06 NOTE — PROGRESS NOTE ADULT - SUBJECTIVE AND OBJECTIVE BOX
Subjective    is a 48 y.o. female following up from US for pelvic pain.     Chief Complaint   Patient presents with   • Follow-up        HPI    48 y.o.    S/P Ablation in May - no bleeding   With LLQ pain since    Pain constant -   Seen by PCP -   Told had 3.0 cm left sided cyst - Told 3.9 cm on ultrasound 22 per pt - seen in early december  Here to follow up     Review of Systems   Gastrointestinal: Negative for abdominal pain, constipation and diarrhea.   Genitourinary: Positive for pelvic pain. Negative for dysuria, hematuria, vaginal bleeding and vaginal discharge.        Objective   /94   Pulse 93   Ht 152.4 cm (60\")   Wt 117 kg (259 lb)   BMI 50.58 kg/m²   Physical Exam  Constitutional:       General: She is not in acute distress.     Appearance: Normal appearance. She is well-developed and normal weight.   Neck:      Thyroid: No thyromegaly.   Pulmonary:      Effort: No respiratory distress.   Abdominal:      General: Abdomen is flat. There is no distension.      Palpations: Abdomen is soft.      Tenderness: There is no abdominal tenderness.   Musculoskeletal:      Right lower leg: No edema.      Left lower leg: No edema.   Neurological:      Mental Status: She is alert and oriented to person, place, and time.   Skin:     General: Skin is warm and dry.   Psychiatric:         Behavior: Behavior normal.         Thought Content: Thought content normal.         Judgment: Judgment normal.   Vitals reviewed.        GYN ultrasound  No significant cyst on left side     Assessment/Plan   Diagnoses and all orders for this visit:    1. Pelvic pain (Primary)    Other orders  -     norethindrone (MICRONOR) 0.35 MG tablet; Take 1 tablet by mouth Daily.  Dispense: 84 tablet; Refill: 3    No evidence of cyst being an issue (it has resolved)   Still with significant residual pain    Discussed other causes of pelvic pain - GYN and non-GYN  Not menstrual after ablation, so hard to tell if related  CRITICAL CARE ATTENDING - CTICU    MEDICATIONS  (STANDING):  argatroban Infusion 2 MICROgram(s)/kG/Min (9.324 mL/Hr) IV Continuous <Continuous>  DOBUTamine Infusion 5 MICROgram(s)/kG/Min (11.655 mL/Hr) IV Continuous <Continuous>  docusate sodium 100 milliGRAM(s) Oral three times a day  EPINEPHrine 8 milliGRAM(s),sodium chloride 250 milliLiter(s) 8 milliGRAM(s) (8 mL/Hr) IV Continuous <Continuous>  fluconAZOLE IVPB      fluconAZOLE IVPB 200 milliGRAM(s) IV Intermittent every 24 hours  insulin Infusion 30 Unit(s)/Hr (30 mL/Hr) IV Continuous <Continuous>  meropenem  IVPB      meropenem  IVPB 1000 milliGRAM(s) IV Intermittent every 12 hours  metroNIDAZOLE  IVPB      metroNIDAZOLE  IVPB 500 milliGRAM(s) IV Intermittent every 8 hours  milrinone Infusion 0.1 MICROgram(s)/kG/Min (2.331 mL/Hr) IV Continuous <Continuous>  mycophenolate mofetil 1000 milliGRAM(s) Oral <User Schedule>  pantoprazole  Injectable 40 milliGRAM(s) IV Push daily  predniSONE   Tablet 30 milliGRAM(s) Oral <User Schedule>  sodium chloride 0.9%. 1000 milliLiter(s) (10 mL/Hr) IV Continuous <Continuous>  tacrolimus 4 milliGRAM(s) Oral <User Schedule>  vasopressin Infusion 0.1 Unit(s)/Min (6 mL/Hr) IV Continuous <Continuous>                                    8.2    21.7  )-----------( 148      ( 2018 00:49 )             25.1       -    139  |  101  |  45<H>  ----------------------------<  107<H>  3.6   |  26  |  1.45<H>    Ca    8.0<L>      2018 00:49  Phos  3.8     02    TPro  5.8<L>  /  Alb  3.4  /  TBili  0.6  /  DBili  x   /  AST  27  /  ALT  13  /  AlkPhos  54  -28      PT/INR - ( 2018 00:49 )   PT: 14.0 sec;   INR: 1.29 ratio         PTT - ( 2018 06:40 )  PTT:58.2 sec        Daily     Daily Weight in k.8 (2018 00:00)       @ 07:01  -   @ 07:00  --------------------------------------------------------  IN: 2240 mL / OUT: 1775 mL / NET: 465 mL        Critically Ill patient  : [ ] preoperative ,   [x] post operative    Requires :  [x] Arterial Line   [x Central Line  [x ] PA catheter  [ ] IABP  [ ] ECMO  [ ] LVAD  [ ] Ventilator  [x] pacemaker [ ] Impella.    Bedside evaluation , monitoring , treatment of hemodynamics , fluids , IVP/ IVCD meds.        Diagnosis:     POD 5 Heart Transplant    Cardiogenic Shock - resolving    CHF- acute [x   chronic [ ]    systolic [x ]   diatolic [x ]          - Echo- EF -   Acute Graft Dysfxn.          [x ] RV dysfunction          - Cxr-cardiomegally, edema          - Clinical-  [x ]inotropes   [x ]pressors   [x ]diuresis   [ ]IABP   [ ]ECMO   [ ]LVAD   [ ]Respiratory Failure    Hemodynamic lability,instability. Requires IVCD [x ] vasopressors [x ] inotropes  [ ] vasodilator  [ ]IVSS fluid  to maintain MAP, perfusion, C.I.     fluid overload     Requires bedside physical therapy, mobilization and total USP care.     Renal Failure     NO2 wean    For Myocardial Bx. tomorrow    Thrombocytopenia    Temporary pacemaker (TPM) interrogation and setting.     Havelock Cassidy catheter interpretation and therapeutic management of unstable hemodynamics                         -                     Discussed with CT surgeon, Physician's Assistant - Nurse Practitioner- Critical care medicine team.   Dicussed at  AM / PM rounds.   Chart, labs , films reviewed.    Total Time: 120 min. to cycle.   Trial of POP to suppress for now during next 3-6 months. If better, is GYN and can either do that to menopause or treat with TLH-BS?O  If not consider PCP to look at NON-GYN or return for definitive surgery and risk not helping the pain.   Voiced understanding with plan     Dima Escamilla MD   1/6/2023  11:17 EST

## 2023-01-06 NOTE — PROGRESS NOTE ADULT - ASSESSMENT
BILLY RUSSELL is a 72y Male with a past medical history as described above who presented for evaluation of low hemoglobin on CBC. Being treated with steroids by Dr. Garcia at [facility] for mixed autoimmune hemolytic anemia. Hematology consulted due to concern for AIHA flare.     # Mixed Autoimmune Hemolytic Anemia  - Pulse steroids with dexamethasone 40mg IV daily for 4 days. This is day 3/4.  - Check hemolysis labs daily - LDH, hapto, retic, fractionated bili, etc   - Avoid blood transfusion unless he is significantly symptomatic due to risk of hyperhemolysis. If he does need blood, please make sure it is appropriately cross matched and warmed. Any IV medication including fluids should be warmed before infusing given his history of cold agglutination   - Hb dropped today to 6.0   - Folic acid 1mg PO daily   - Daily CBC    Fabian Moreau MD, PGY-4  Hematology/Medical Oncology Fellow  Pager: [phone]  Available on Microsoft Teams  After 5pm or on weekends please contact  to page on-call fellow    BILLY RUSSELL is a 72y Male with a past medical history as described above who presented for evaluation of low hemoglobin on CBC. Being treated with steroids by Dr. Garcia at [facility] for mixed autoimmune hemolytic anemia. Hematology consulted due to concern for AIHA flare.     # Mixed Autoimmune Hemolytic Anemia  - Pulse steroids with dexamethasone 40mg IV daily for 4 days. This is day 3/4.  - Check hemolysis labs daily - LDH, hapto, retic, fractionated bili, etc   - Avoid blood transfusion unless he is significantly symptomatic due to risk of hyperhemolysis. If he does need blood, please make sure it is appropriately cross matched and warmed. Any IV medication including fluids should be warmed before infusing given his history of cold agglutination   - Hb dropped today to 6.0, give 1mg/kg IVIG   - Folic acid 1mg PO daily   - Daily CBC    Fabian Moreau MD, PGY-4  Hematology/Medical Oncology Fellow  Pager: [phone]  Available on Microsoft Teams  After 5pm or on weekends please contact  to page on-call fellow    BILLY RUSSELL is a 72y Male with a past medical history as described above who presented for evaluation of low hemoglobin on CBC. Being treated with steroids by Dr. Garcia at [facility] for mixed autoimmune hemolytic anemia. Hematology consulted due to concern for AIHA flare.     # Mixed Autoimmune Hemolytic Anemia  - Pulse steroids with dexamethasone 40mg IV daily for 4 days. This is day 3/4.  - Check hemolysis labs daily - LDH, hapto, retic, fractionated bili, etc   - Avoid blood transfusion unless he is significantly symptomatic due to risk of hyperhemolysis. If he does need blood, please make sure it is appropriately cross matched and warmed. Any IV medication including fluids should be warmed before infusing given his history of cold agglutination   - Hb dropped today to 6.0, give 1g/kg IVIG   - Folic acid 1mg PO daily   - Daily CBC    Fabian Moreau MD, PGY-4  Hematology/Medical Oncology Fellow  Pager: [phone]  Available on Microsoft Teams  After 5pm or on weekends please contact  to page on-call fellow    BILLY RUSSELL is a 72y Male with a past medical history as described above who presented for evaluation of low hemoglobin on CBC. Being treated with steroids by Dr. Garcia at [facility] for mixed autoimmune hemolytic anemia. Hematology consulted due to concern for AIHA flare.     # Mixed Autoimmune Hemolytic Anemia  - Pulse steroids with dexamethasone 40mg IV daily for 4 days. This is day 3/4.  - Check hemolysis labs daily - LDH, hapto, retic, fractionated bili, etc   - Avoid blood transfusion unless he is significantly symptomatic due to risk of hyperhemolysis. If he does need blood, please make sure it is appropriately cross matched and warmed- all fluids should be warmed. Any IV medication including fluids should be warmed before infusing given his history of cold agglutination   - Hb dropped today to 6.0, give 1g/kg IVIG x2 days  - Send cryoglobulin level  - Folic acid 1mg PO daily   - Daily CBC    Fabian Moreau MD, PGY-4  Hematology/Medical Oncology Fellow  Pager: [phone]  Available on Microsoft Teams  After 5pm or on weekends please contact  to page on-call fellow    BILLY RUSSELL is a 72y Male with a past medical history as described above who presented for evaluation of low hemoglobin on CBC. Being treated with steroids by Dr. Garcia at [facility] for mixed autoimmune hemolytic anemia. Hematology consulted due to concern for AIHA flare.     # Mixed Autoimmune Hemolytic Anemia  - Pulse steroids with dexamethasone 40mg IV daily for 4 days. This is day 3/4.  - Check hemolysis labs daily - LDH, hapto, retic, fractionated bili, etc   - Avoid blood transfusion unless he is significantly symptomatic due to risk of hyperhemolysis. If he does need blood, please make sure it is appropriately cross matched and warmed- all fluids should be warmed. Any IV medication including fluids should be warmed before infusing given his history of cold agglutination   - Hb dropped today to 6.0, give 1g/kg IVIG x 2 days   - Send cryoglobulin level  - Folic acid 1mg PO daily   - Daily CBC    Fabian Moreau MD, PGY-4  Hematology/Medical Oncology Fellow  Pager: [phone]  Available on Microsoft Teams  After 5pm or on weekends please contact  to page on-call fellow

## 2023-01-06 NOTE — PROGRESS NOTE ADULT - ASSESSMENT
70 male h/o hemolytic anemia, NICM s/p heart transplnt, hep c, here with anemia    hemolytic anemia  heme consulted  40mg PO dexamethasone x4 days. If no response, will consider IVIG/Rituxan  - Hemolysis labs daily  hold transfusion unless symptomatic  keep warm    s/p heart transplant  cont transplant meds  cont home cardiac meds  transplant cardiology f/u  repeat echo unchanged    ckd  stable  renal following      Advanced care planning was discussed with patient and family.  Advanced care planning forms were reviewed and discussed as appropriate.  Differential diagnosis and plan of care discussed with patient after the evaluation.   Pain assessed and judicious use of narcotics when appropriate was discussed.  Importance of Fall prevention discussed.  Counseling on Smoking and Alcohol cessation was offered when appropriate.  Counseling on Diet, exercise, and medication compliance was done.   Approx 30 minutes spent.

## 2023-01-06 NOTE — PROVIDER CONTACT NOTE (OTHER) - RECOMMENDATIONS
Follow protocol, 35ml/hr for the first 30mins, increase every 30minutes to 70ml/hr, then 140ml/hr, then 280ml/hr, and max at 350ml/hr. Should we cap max rate at 116.67ml/hr?

## 2023-01-07 LAB
ANION GAP SERPL CALC-SCNC: 14 MMOL/L — SIGNIFICANT CHANGE UP (ref 5–17)
BILIRUB DIRECT SERPL-MCNC: 0.5 MG/DL — HIGH (ref 0–0.3)
BILIRUB INDIRECT FLD-MCNC: 4.3 MG/DL — HIGH (ref 0.2–1)
BILIRUB SERPL-MCNC: 4.8 MG/DL — HIGH (ref 0.2–1.2)
BUN SERPL-MCNC: 65 MG/DL — HIGH (ref 7–23)
CALCIUM SERPL-MCNC: 8.2 MG/DL — LOW (ref 8.4–10.5)
CHLORIDE SERPL-SCNC: 104 MMOL/L — SIGNIFICANT CHANGE UP (ref 96–108)
CO2 SERPL-SCNC: 19 MMOL/L — LOW (ref 22–31)
CREAT SERPL-MCNC: 1.99 MG/DL — HIGH (ref 0.5–1.3)
EGFR: 35 ML/MIN/1.73M2 — LOW
GLUCOSE SERPL-MCNC: 98 MG/DL — SIGNIFICANT CHANGE UP (ref 70–99)
HAPTOGLOB SERPL-MCNC: <20 MG/DL — LOW (ref 34–200)
HCT VFR BLD CALC: 16 % — CRITICAL LOW (ref 39–50)
HGB BLD-MCNC: 5.4 G/DL — CRITICAL LOW (ref 13–17)
LDH SERPL L TO P-CCNC: 531 U/L — HIGH (ref 50–242)
MCHC RBC-ENTMCNC: 33.8 GM/DL — SIGNIFICANT CHANGE UP (ref 32–36)
MCHC RBC-ENTMCNC: 34.8 PG — HIGH (ref 27–34)
MCV RBC AUTO: 103.2 FL — HIGH (ref 80–100)
NRBC # BLD: 1 /100 WBCS — HIGH (ref 0–0)
PLATELET # BLD AUTO: 222 K/UL — SIGNIFICANT CHANGE UP (ref 150–400)
POTASSIUM SERPL-MCNC: 4.9 MMOL/L — SIGNIFICANT CHANGE UP (ref 3.5–5.3)
POTASSIUM SERPL-SCNC: 4.9 MMOL/L — SIGNIFICANT CHANGE UP (ref 3.5–5.3)
RBC # BLD: 1.55 M/UL — LOW (ref 4.2–5.8)
RBC # BLD: 1.55 M/UL — LOW (ref 4.2–5.8)
RBC # FLD: 21.2 % — HIGH (ref 10.3–14.5)
RETICS #: 205.5 K/UL — HIGH (ref 25–125)
RETICS/RBC NFR: 13.3 % — HIGH (ref 0.5–2.5)
SODIUM SERPL-SCNC: 137 MMOL/L — SIGNIFICANT CHANGE UP (ref 135–145)
TACROLIMUS SERPL-MCNC: 2 NG/ML — SIGNIFICANT CHANGE UP
WBC # BLD: 16.72 K/UL — HIGH (ref 3.8–10.5)
WBC # FLD AUTO: 16.72 K/UL — HIGH (ref 3.8–10.5)

## 2023-01-07 PROCEDURE — 99233 SBSQ HOSP IP/OBS HIGH 50: CPT | Mod: GC

## 2023-01-07 RX ORDER — TACROLIMUS 5 MG/1
4 CAPSULE ORAL
Refills: 0 | Status: DISCONTINUED | OUTPATIENT
Start: 2023-01-07 | End: 2023-01-08

## 2023-01-07 RX ADMIN — IMMUNE GLOBULIN (HUMAN) 175 GRAM(S): 10 INJECTION INTRAVENOUS; SUBCUTANEOUS at 23:54

## 2023-01-07 RX ADMIN — ATORVASTATIN CALCIUM 10 MILLIGRAM(S): 80 TABLET, FILM COATED ORAL at 22:43

## 2023-01-07 RX ADMIN — TACROLIMUS 4 MILLIGRAM(S): 5 CAPSULE ORAL at 20:40

## 2023-01-07 RX ADMIN — LOSARTAN POTASSIUM 100 MILLIGRAM(S): 100 TABLET, FILM COATED ORAL at 05:26

## 2023-01-07 RX ADMIN — TACROLIMUS 3 MILLIGRAM(S): 5 CAPSULE ORAL at 10:49

## 2023-01-07 RX ADMIN — PANTOPRAZOLE SODIUM 40 MILLIGRAM(S): 20 TABLET, DELAYED RELEASE ORAL at 08:12

## 2023-01-07 RX ADMIN — Medication 1 MILLIGRAM(S): at 11:45

## 2023-01-07 RX ADMIN — Medication 650 MILLIGRAM(S): at 22:42

## 2023-01-07 RX ADMIN — Medication 25 MILLIGRAM(S): at 22:42

## 2023-01-07 RX ADMIN — Medication 40 MILLIGRAM(S): at 05:26

## 2023-01-07 RX ADMIN — Medication 200 MILLIGRAM(S): at 05:25

## 2023-01-07 NOTE — CHART NOTE - NSCHARTNOTEFT_GEN_A_CORE
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 Drop of Hb from 11.5 to 6.5 in ~3 weeks.       # Mixed autoimmune hemolytic anemia  - Etiology ? tacrolimus induced. Previous viral workup, panCT and peripheral flow unrevealing  - Has needed steroids, IVIG and Rituxan historically; currently on 5mg prednisone daily  - Now t bili, LDH elevated. Hapto pending. Smear with polychromasia, spherocytes, platelet clumping, rouleax formation  - Recommend 40mg PO dexamethasone x4 days. If no response, will consider IVIG/Rituxan  - Hemolysis labs stat and then daily: CBC, LDH, hapto, indirect bili, retic count and percentage  - Please HOLD blood transfusion UNLESS patient has symptomatic anemia. He will likely hemolyze all transfused blood  - Formal heme consult in AM       Lyndsay Valentin, PGY-6  Hematology-Oncology Fellow  [phone] ([place]) [phone] ([facility])  I can also be reached on Microsoft Teams  Please page fellow on call after 5pm and on weekends
Notified by RN that patient on cardiac monitor with episode of tachycardia -170 for approx. 1 minute .   Patient seen and evaluated at bedside,  asymptomatic ,denies palpitation, chest pain,  dizziness, lightheadedness, SOB, abdominal pain, fever, chills  Reported that he was up ambulated  to the bathroom accompanied by RN when episode occurred    Vital Signs Last 24 Hrs  T(C): 36.7 (04 Jan 2023 21:47), Max: 36.7 (04 Jan 2023 16:24)  T(F): 98 (04 Jan 2023 21:47), Max: 98 (04 Jan 2023 16:24)  HR: 114 (04 Jan 2023 21:47) (97 - 114)  BP: 106/84 (04 Jan 2023 21:47) (104/71 - 111/75)  BP(mean): --  RR: 15 (04 Jan 2023 21:47) (15 - 19)  SpO2: 98% (04 Jan 2023 21:47) (96% - 100%)    Stat EKG- similar to previous sinus tachycardia with RBBB   Continue to monitor on Tele   Vital sign Q 4 hrs   cont home cardiac meds  F/u with Attending in hortencia Black ANP-C  Department of Medicine  [id number]
Per Cardiac transplant team - will resume home dose of tacrolimus (3mg BID) starting tomorrow and will also resume statin.
Pt with Hb/Hct as below, pt with hx of Autoimmunehemolytic anemia: with Hb/Hct slightly reduced today. Retic count uptrending; likely ongoing hemolysis  d/w heme on call fellow; she will d/w attending and let me know the transfusion plan if any today. Pt is asymptomatic A&Ox3 VSS    Pertinent Labs                      5.4    16.72 )-----------( 222      ( 07 Jan 2023 07:19 )             16.0     01-07    137  |  104  |  65<H>  ----------------------------<  98  4.9   |  19<L>  |  1.99<H>    Ca    8.2<L>      07 Jan 2023 07:17    Reticulocyte Percent: 13.3 % (01.07.23 @ 07:19)    Reticulocyte Percent: 6.0 % (01.05.23 @ 06:37)      TPro  x   /  Alb  x   /  TBili  4.8<H>  /  DBili  0.5<H>  /  AST  x   /  ALT  x   /  AlkPhos  x   01-07    LIVER FUNCTIONS - ( 06 Jan 2023 07:21 )  Alb: 4.1 g/dL / Pro: 6.8 g/dL / ALK PHOS: 53 U/L / ALT: 12 U/L / AST: 28 U/L / GGT: x

## 2023-01-07 NOTE — PROGRESS NOTE ADULT - ASSESSMENT
70 male h/o hemolytic anemia, NICM s/p heart transplnt, hep c, here with anemia    hemolytic anemia  heme consulted  40mg PO dexamethasone x4 days. If no response, will consider IVIG/Rituxan  - Hemolysis labs daily  hold transfusion unless symptomatic.  currently receiving 1 unit prbc for hgb 5    s/p heart transplant  cont transplant meds  cont home cardiac meds  transplant cardiology f/u  repeat echo unchanged    ckd  stable  renal following      Advanced care planning was discussed with patient and family.  Advanced care planning forms were reviewed and discussed as appropriate.  Differential diagnosis and plan of care discussed with patient after the evaluation.   Pain assessed and judicious use of narcotics when appropriate was discussed.  Importance of Fall prevention discussed.  Counseling on Smoking and Alcohol cessation was offered when appropriate.  Counseling on Diet, exercise, and medication compliance was done.   Approx 30 minutes spent.

## 2023-01-07 NOTE — PROGRESS NOTE ADULT - ASSESSMENT
BILLY RUSSELL is a 72y Male with a past medical history as described above who presented for evaluation of low hemoglobin on CBC. Being treated with steroids by Dr. Rosario at [facility] for mixed autoimmune hemolytic anemia. Hematology consulted due to concern for AIHA flare.     # Mixed Autoimmune Hemolytic Anemia  - Pulse steroids with dexamethasone 40mg IV daily for 4 days. This is day 4  - Continue 1g/kg IVIG x 2 days   - Hb < 6 g and is continuously trending down. Will plan PRBC tx with warmer.   - Check hemolysis labs daily - LDH, hapto, retic, fractionated bili, etc   - Send cryoglobulin level  - Folic acid 1mg PO daily   - Daily CBC

## 2023-01-08 LAB
ANION GAP SERPL CALC-SCNC: 11 MMOL/L — SIGNIFICANT CHANGE UP (ref 5–17)
BILIRUB DIRECT SERPL-MCNC: 0.5 MG/DL — HIGH (ref 0–0.3)
BILIRUB INDIRECT FLD-MCNC: 3.9 MG/DL — HIGH (ref 0.2–1)
BILIRUB SERPL-MCNC: 4.4 MG/DL — HIGH (ref 0.2–1.2)
BUN SERPL-MCNC: 67 MG/DL — HIGH (ref 7–23)
CALCIUM SERPL-MCNC: 7.7 MG/DL — LOW (ref 8.4–10.5)
CHLORIDE SERPL-SCNC: 105 MMOL/L — SIGNIFICANT CHANGE UP (ref 96–108)
CO2 SERPL-SCNC: 18 MMOL/L — LOW (ref 22–31)
CREAT SERPL-MCNC: 1.77 MG/DL — HIGH (ref 0.5–1.3)
DAT C3-SP REAG RBC QL: NEGATIVE — SIGNIFICANT CHANGE UP
EGFR: 40 ML/MIN/1.73M2 — LOW
GLUCOSE SERPL-MCNC: 91 MG/DL — SIGNIFICANT CHANGE UP (ref 70–99)
HAPTOGLOB SERPL-MCNC: <20 MG/DL — LOW (ref 34–200)
HCT VFR BLD CALC: 18.2 % — CRITICAL LOW (ref 39–50)
HGB BLD-MCNC: 6.1 G/DL — CRITICAL LOW (ref 13–17)
LDH SERPL L TO P-CCNC: 631 U/L — HIGH (ref 50–242)
MCHC RBC-ENTMCNC: 33.5 GM/DL — SIGNIFICANT CHANGE UP (ref 32–36)
MCHC RBC-ENTMCNC: 35.7 PG — HIGH (ref 27–34)
MCV RBC AUTO: 106.4 FL — HIGH (ref 80–100)
NRBC # BLD: 4 /100 WBCS — HIGH (ref 0–0)
PLATELET # BLD AUTO: 222 K/UL — SIGNIFICANT CHANGE UP (ref 150–400)
POTASSIUM SERPL-MCNC: 5.2 MMOL/L — SIGNIFICANT CHANGE UP (ref 3.5–5.3)
POTASSIUM SERPL-SCNC: 5.2 MMOL/L — SIGNIFICANT CHANGE UP (ref 3.5–5.3)
RBC # BLD: 1.71 M/UL — LOW (ref 4.2–5.8)
RBC # BLD: 1.71 M/UL — LOW (ref 4.2–5.8)
RBC # FLD: 23.9 % — HIGH (ref 10.3–14.5)
RETICS #: 243.3 K/UL — HIGH (ref 25–125)
RETICS/RBC NFR: 14.5 % — HIGH (ref 0.5–2.5)
SODIUM SERPL-SCNC: 134 MMOL/L — LOW (ref 135–145)
TACROLIMUS SERPL-MCNC: 2.9 NG/ML — SIGNIFICANT CHANGE UP
WBC # BLD: 14.81 K/UL — HIGH (ref 3.8–10.5)
WBC # FLD AUTO: 14.81 K/UL — HIGH (ref 3.8–10.5)

## 2023-01-08 RX ORDER — TACROLIMUS 5 MG/1
5 CAPSULE ORAL
Refills: 0 | Status: DISCONTINUED | OUTPATIENT
Start: 2023-01-08 | End: 2023-01-12

## 2023-01-08 RX ADMIN — LOSARTAN POTASSIUM 100 MILLIGRAM(S): 100 TABLET, FILM COATED ORAL at 05:23

## 2023-01-08 RX ADMIN — Medication 200 MILLIGRAM(S): at 05:23

## 2023-01-08 RX ADMIN — Medication 40 MILLIGRAM(S): at 05:24

## 2023-01-08 RX ADMIN — PANTOPRAZOLE SODIUM 40 MILLIGRAM(S): 20 TABLET, DELAYED RELEASE ORAL at 05:22

## 2023-01-08 RX ADMIN — TACROLIMUS 4 MILLIGRAM(S): 5 CAPSULE ORAL at 08:33

## 2023-01-08 RX ADMIN — ATORVASTATIN CALCIUM 10 MILLIGRAM(S): 80 TABLET, FILM COATED ORAL at 21:23

## 2023-01-08 RX ADMIN — Medication 1 MILLIGRAM(S): at 12:08

## 2023-01-08 RX ADMIN — TACROLIMUS 5 MILLIGRAM(S): 5 CAPSULE ORAL at 21:23

## 2023-01-08 NOTE — PROGRESS NOTE ADULT - ASSESSMENT
70 male h/o hemolytic anemia, NICM s/p heart transplnt, hep c, here with anemia    hemolytic anemia  heme consulted  40mg PO dexamethasone x4 days. If no response, will consider IVIG/Rituxan  - Hemolysis labs daily  hold transfusion unless symptomatic.  s/p 1 unit prbc    s/p heart transplant  cont transplant meds  cont home cardiac meds  transplant cardiology f/u  repeat echo unchanged    ckd  stable  renal following      Advanced care planning was discussed with patient and family.  Advanced care planning forms were reviewed and discussed as appropriate.  Differential diagnosis and plan of care discussed with patient after the evaluation.   Pain assessed and judicious use of narcotics when appropriate was discussed.  Importance of Fall prevention discussed.  Counseling on Smoking and Alcohol cessation was offered when appropriate.  Counseling on Diet, exercise, and medication compliance was done.   Approx 30 minutes spent.

## 2023-01-09 DIAGNOSIS — N18.30 CHRONIC KIDNEY DISEASE, STAGE 3 UNSPECIFIED: ICD-10-CM

## 2023-01-09 LAB
ANION GAP SERPL CALC-SCNC: 9 MMOL/L — SIGNIFICANT CHANGE UP (ref 5–17)
BUN SERPL-MCNC: 54 MG/DL — HIGH (ref 7–23)
CALCIUM SERPL-MCNC: 7.8 MG/DL — LOW (ref 8.4–10.5)
CHLORIDE SERPL-SCNC: 111 MMOL/L — HIGH (ref 96–108)
CO2 SERPL-SCNC: 19 MMOL/L — LOW (ref 22–31)
CREAT SERPL-MCNC: 1.66 MG/DL — HIGH (ref 0.5–1.3)
EGFR: 44 ML/MIN/1.73M2 — LOW
GLUCOSE SERPL-MCNC: 101 MG/DL — HIGH (ref 70–99)
HAPTOGLOB SERPL-MCNC: <20 MG/DL — LOW (ref 34–200)
HCT VFR BLD CALC: 18.7 % — CRITICAL LOW (ref 39–50)
HGB BLD-MCNC: 6.8 G/DL — CRITICAL LOW (ref 13–17)
LDH SERPL L TO P-CCNC: 605 U/L — HIGH (ref 50–242)
MCHC RBC-ENTMCNC: 36.4 GM/DL — HIGH (ref 32–36)
MCHC RBC-ENTMCNC: 38.9 PG — HIGH (ref 27–34)
MCV RBC AUTO: 106.9 FL — HIGH (ref 80–100)
NRBC # BLD: 12 /100 WBCS — HIGH (ref 0–0)
PLATELET # BLD AUTO: 227 K/UL — SIGNIFICANT CHANGE UP (ref 150–400)
POTASSIUM SERPL-MCNC: 4.7 MMOL/L — SIGNIFICANT CHANGE UP (ref 3.5–5.3)
POTASSIUM SERPL-SCNC: 4.7 MMOL/L — SIGNIFICANT CHANGE UP (ref 3.5–5.3)
RBC # BLD: 1.75 M/UL — LOW (ref 4.2–5.8)
RBC # BLD: 1.75 M/UL — LOW (ref 4.2–5.8)
RBC # FLD: 26.9 % — HIGH (ref 10.3–14.5)
RETICS #: 335.7 K/UL — HIGH (ref 25–125)
RETICS/RBC NFR: 19.2 % — HIGH (ref 0.5–2.5)
SODIUM SERPL-SCNC: 139 MMOL/L — SIGNIFICANT CHANGE UP (ref 135–145)
TACROLIMUS SERPL-MCNC: 4.7 NG/ML — SIGNIFICANT CHANGE UP
WBC # BLD: 16.75 K/UL — HIGH (ref 3.8–10.5)
WBC # FLD AUTO: 16.75 K/UL — HIGH (ref 3.8–10.5)

## 2023-01-09 PROCEDURE — 99232 SBSQ HOSP IP/OBS MODERATE 35: CPT | Mod: GC

## 2023-01-09 PROCEDURE — 99233 SBSQ HOSP IP/OBS HIGH 50: CPT

## 2023-01-09 RX ADMIN — TACROLIMUS 5 MILLIGRAM(S): 5 CAPSULE ORAL at 21:11

## 2023-01-09 RX ADMIN — ATORVASTATIN CALCIUM 10 MILLIGRAM(S): 80 TABLET, FILM COATED ORAL at 21:11

## 2023-01-09 RX ADMIN — Medication 200 MILLIGRAM(S): at 07:08

## 2023-01-09 RX ADMIN — Medication 1 MILLIGRAM(S): at 08:32

## 2023-01-09 RX ADMIN — TACROLIMUS 5 MILLIGRAM(S): 5 CAPSULE ORAL at 09:00

## 2023-01-09 RX ADMIN — Medication 40 MILLIGRAM(S): at 07:08

## 2023-01-09 RX ADMIN — PANTOPRAZOLE SODIUM 40 MILLIGRAM(S): 20 TABLET, DELAYED RELEASE ORAL at 07:08

## 2023-01-09 RX ADMIN — LOSARTAN POTASSIUM 100 MILLIGRAM(S): 100 TABLET, FILM COATED ORAL at 07:08

## 2023-01-09 NOTE — PROVIDER CONTACT NOTE (CRITICAL VALUE NOTIFICATION) - ACTION/TREATMENT ORDERED:
Provider aware; no immediate interventions.
ACP aware
Continue monitoring patient
no new orders at this time, will cont monitoring
continue to monitor

## 2023-01-09 NOTE — PROGRESS NOTE ADULT - PROBLEM SELECTOR PLAN 2
- Tacro 5 mg PO BID Goal 6-8  - holding home prednisone as patient is on IV dexamethasone.  - Not on cellcept ?

## 2023-01-09 NOTE — PROGRESS NOTE ADULT - ASSESSMENT
BILLY RUSSELL is a 72y Male with a past medical history as described above who presented for evaluation of low hemoglobin on CBC. Being treated with steroids by Dr. Garcia at [facility] for mixed autoimmune hemolytic anemia. Hematology consulted due to concern for AIHA flare.     # Mixed Autoimmune Hemolytic Anemia  - Pulse steroids with dexamethasone 40mg IV daily for 4 days. This is day 3/4.  - Check hemolysis labs daily - LDH, hapto, retic, fractionated bili, etc   - Avoid blood transfusion unless he is significantly symptomatic due to risk of hyperhemolysis. If he does need blood, please make sure it is appropriately cross matched and warmed- all fluids should be warmed. Any IV medication including fluids should be warmed before infusing given his history of cold agglutination   - Hb dropped today to 5.4 over the weekend, and recieved a unit of blood. S/P 1g/kg IVIG x 2 days   - Cryoglobulin level pending  - Will update Dr. Rosario    - Folic acid 1mg PO daily   - Daily CBC    Fabian Moreau MD, PGY-4  Hematology/Medical Oncology Fellow  Pager: [phone]  Available on Microsoft Teams  After 5pm or on weekends please contact  to page on-call fellow    BILLY RUSSELL is a 72y Male with a past medical history as described above who presented for evaluation of low hemoglobin on CBC. Being treated with steroids by Dr. Garcia at [facility] for mixed autoimmune hemolytic anemia. Hematology consulted due to concern for AIHA flare.     # Mixed Autoimmune Hemolytic Anemia  - Pulse steroids with dexamethasone 40mg IV daily for 4 days. This is day 3/4.  - Check hemolysis labs daily - LDH, hapto, retic, fractionated bili, etc   - Avoid blood transfusion unless he is significantly symptomatic due to risk of hyperhemolysis. If he does need blood, please make sure it is appropriately cross matched and warmed- all fluids should be warmed. Any IV medication including fluids should be warmed before infusing given his history of cold agglutination   - Hb dropped today to 5.4 over the weekend, and recieved a unit of blood. S/P 1g/kg IVIG x 2 days   - Cryoglobulin level pending  - Start 1mg/kg prednisone daily per Dr. Rosario = 75mg daily     - Folic acid 1mg PO daily   - Daily CBC    Fabian Moreau MD, PGY-4  Hematology/Medical Oncology Fellow  Pager: [phone]  Available on Microsoft Teams  After 5pm or on weekends please contact  to page on-call fellow

## 2023-01-09 NOTE — PROGRESS NOTE ADULT - PROBLEM SELECTOR PLAN 1
- s/p OHT in 2/23/18  - continue home Toprol  mg PO QD and Losartan 100 mg PO QD  - can resume pravastatin 20 mg PO QD  - holding ASA at this time   - resume home pantoprazole 40 mg PO QD    - schedule to undergo annual RHC/EMBx with Dr. Carpenter in February  - TTE from 1/5/23 shows normal graft function

## 2023-01-09 NOTE — PROGRESS NOTE ADULT - ASSESSMENT
70 male h/o hemolytic anemia, NICM s/p heart transplnt, hep c, here with anemia    hemolytic anemia  heme consulted  s/p 40mg PO dexamethasone x4 days. If no response, will consider IVIG/Rituxan  - Hemolysis labs daily  hold transfusion unless symptomatic.  s/p 1 unit prbc  now on prednisone as per heme    s/p heart transplant  cont transplant meds  cont home cardiac meds  transplant cardiology f/u  repeat echo unchanged    ckd  stable  renal following      Advanced care planning was discussed with patient and family.  Advanced care planning forms were reviewed and discussed as appropriate.  Differential diagnosis and plan of care discussed with patient after the evaluation.   Pain assessed and judicious use of narcotics when appropriate was discussed.  Importance of Fall prevention discussed.  Counseling on Smoking and Alcohol cessation was offered when appropriate.  Counseling on Diet, exercise, and medication compliance was done.   Approx 30 minutes spent.

## 2023-01-09 NOTE — PROGRESS NOTE ADULT - PROBLEM SELECTOR PLAN 3
- readmitted for Hgb of 6.5  - heme following and appreciate recommendations  - per Heme, patient will be given dexamethasone 40mg IV daily for 4 days (started 1/4)  - Check hemolysis labs daily - LDH, hapto, retic, fractionated bili, etc   - per heme, Avoid blood transfusion unless he is significantly symptomatic due to risk of hyperhemolysis. Of note if a transfusion is needed it is ok from heart transplant perspective   - plan to send blood smear.

## 2023-01-09 NOTE — PROVIDER CONTACT NOTE (CRITICAL VALUE NOTIFICATION) - PERSON GIVING RESULT:
rBad Mg
Jabier Benites/ Hem lab
LAUREN Rodriguez - Monica
Ignacio Anderson
Maxim Mg [facility]

## 2023-01-09 NOTE — PROVIDER CONTACT NOTE (CRITICAL VALUE NOTIFICATION) - SITUATION
Pt w/ asymptomatic anemia - per hematology no transfusion unless Hgb < 6 or pt symptomatic.
H&H 6.1/18.2
Pt CBC today, H/H 6.0/18.2.

## 2023-01-09 NOTE — PROVIDER CONTACT NOTE (CRITICAL VALUE NOTIFICATION) - NS PROVIDER READ BACK
Pt is now on depo and not on the birth control so she is getting acne. Wondering if you could do a refill of her topical acne med  Blanquita San/  
yes

## 2023-01-09 NOTE — PROGRESS NOTE ADULT - ASSESSMENT
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 Upon arrival he was noted to have low Hgb, heme following and currently being treated with IV dexamethasone. From a heart transplant perspective he is doing well and remains hemodynamically stable. He appear euvolemic on exam, however renal function is slightly above baseline. Will adjust his immunosuppression as needed.

## 2023-01-10 ENCOUNTER — NON-APPOINTMENT (OUTPATIENT)
Age: 73
End: 2023-01-10

## 2023-01-10 LAB
BILIRUB DIRECT SERPL-MCNC: 0.5 MG/DL — HIGH (ref 0–0.3)
BILIRUB INDIRECT FLD-MCNC: 4.6 MG/DL — HIGH (ref 0.2–1)
BILIRUB SERPL-MCNC: 5.1 MG/DL — HIGH (ref 0.2–1.2)
CRYOGLOB SERPL-MCNC: NEGATIVE — SIGNIFICANT CHANGE UP
HAPTOGLOB SERPL-MCNC: <20 MG/DL — LOW (ref 34–200)
HCT VFR BLD CALC: 21.4 % — LOW (ref 39–50)
HGB BLD-MCNC: 7 G/DL — CRITICAL LOW (ref 13–17)
LDH SERPL L TO P-CCNC: 717 U/L — HIGH (ref 50–242)
MCHC RBC-ENTMCNC: 32.7 GM/DL — SIGNIFICANT CHANGE UP (ref 32–36)
MCHC RBC-ENTMCNC: 34 PG — SIGNIFICANT CHANGE UP (ref 27–34)
MCV RBC AUTO: 103.9 FL — HIGH (ref 80–100)
NRBC # BLD: 15 /100 WBCS — HIGH (ref 0–0)
PLATELET # BLD AUTO: 223 K/UL — SIGNIFICANT CHANGE UP (ref 150–400)
RBC # BLD: 2.06 M/UL — LOW (ref 4.2–5.8)
RBC # BLD: 2.06 M/UL — LOW (ref 4.2–5.8)
RBC # FLD: 26.3 % — HIGH (ref 10.3–14.5)
RETICS #: 513.1 K/UL — HIGH (ref 25–125)
RETICS/RBC NFR: 24.9 % — HIGH (ref 0.5–2.5)
TACROLIMUS SERPL-MCNC: 5.3 NG/ML — SIGNIFICANT CHANGE UP
WBC # BLD: 19.47 K/UL — HIGH (ref 3.8–10.5)
WBC # FLD AUTO: 19.47 K/UL — HIGH (ref 3.8–10.5)

## 2023-01-10 RX ADMIN — Medication 1 MILLIGRAM(S): at 08:29

## 2023-01-10 RX ADMIN — PANTOPRAZOLE SODIUM 40 MILLIGRAM(S): 20 TABLET, DELAYED RELEASE ORAL at 06:06

## 2023-01-10 RX ADMIN — Medication 200 MILLIGRAM(S): at 06:06

## 2023-01-10 RX ADMIN — ATORVASTATIN CALCIUM 10 MILLIGRAM(S): 80 TABLET, FILM COATED ORAL at 21:49

## 2023-01-10 RX ADMIN — LOSARTAN POTASSIUM 100 MILLIGRAM(S): 100 TABLET, FILM COATED ORAL at 06:06

## 2023-01-10 RX ADMIN — Medication 75 MILLIGRAM(S): at 06:19

## 2023-01-10 RX ADMIN — TACROLIMUS 5 MILLIGRAM(S): 5 CAPSULE ORAL at 20:03

## 2023-01-10 RX ADMIN — TACROLIMUS 5 MILLIGRAM(S): 5 CAPSULE ORAL at 08:28

## 2023-01-10 NOTE — PROGRESS NOTE ADULT - ASSESSMENT
70 male h/o hemolytic anemia, NICM s/p heart transplnt, hep c, here with anemia    hemolytic anemia  heme consulted  s/p 40mg PO dexamethasone x4 days.   s/p ivg  will consider Rituxan  - Hemolysis labs daily  hold transfusion unless symptomatic.  s/p 1 unit prbc  now on prednisone as per heme.  cont to monitor     s/p heart transplant  cont transplant meds  cont home cardiac meds  transplant cardiology f/u  repeat echo unchanged    ckd  stable  renal following      Advanced care planning was discussed with patient and family.  Advanced care planning forms were reviewed and discussed as appropriate.  Differential diagnosis and plan of care discussed with patient after the evaluation.   Pain assessed and judicious use of narcotics when appropriate was discussed.  Importance of Fall prevention discussed.  Counseling on Smoking and Alcohol cessation was offered when appropriate.  Counseling on Diet, exercise, and medication compliance was done.   Approx 30 minutes spent.

## 2023-01-11 ENCOUNTER — RX RENEWAL (OUTPATIENT)
Age: 73
End: 2023-01-11

## 2023-01-11 LAB
ANION GAP SERPL CALC-SCNC: 11 MMOL/L — SIGNIFICANT CHANGE UP (ref 5–17)
BILIRUB DIRECT SERPL-MCNC: 0.5 MG/DL — HIGH (ref 0–0.3)
BILIRUB INDIRECT FLD-MCNC: 4.5 MG/DL — HIGH (ref 0.2–1)
BILIRUB SERPL-MCNC: 5 MG/DL — HIGH (ref 0.2–1.2)
BLD GP AB SCN SERPL QL: POSITIVE — SIGNIFICANT CHANGE UP
BUN SERPL-MCNC: 48 MG/DL — HIGH (ref 7–23)
CA TITR SERPL: SIGNIFICANT CHANGE UP
CALCIUM SERPL-MCNC: 8.1 MG/DL — LOW (ref 8.4–10.5)
CHLORIDE SERPL-SCNC: 111 MMOL/L — HIGH (ref 96–108)
CO2 SERPL-SCNC: 20 MMOL/L — LOW (ref 22–31)
CREAT SERPL-MCNC: 1.41 MG/DL — HIGH (ref 0.5–1.3)
CRYOGLOB SERPL-MCNC: NEGATIVE — SIGNIFICANT CHANGE UP
DAT C3-SP REAG RBC QL: NEGATIVE — SIGNIFICANT CHANGE UP
EGFR: 53 ML/MIN/1.73M2 — LOW
GLUCOSE SERPL-MCNC: 123 MG/DL — HIGH (ref 70–99)
HAPTOGLOB SERPL-MCNC: <20 MG/DL — LOW (ref 34–200)
HCT VFR BLD CALC: 20.5 % — CRITICAL LOW (ref 39–50)
HGB BLD-MCNC: 7.1 G/DL — LOW (ref 13–17)
LDH SERPL L TO P-CCNC: 854 U/L — HIGH (ref 50–242)
MCHC RBC-ENTMCNC: 34.6 GM/DL — SIGNIFICANT CHANGE UP (ref 32–36)
MCHC RBC-ENTMCNC: 36.4 PG — HIGH (ref 27–34)
MCV RBC AUTO: 105.1 FL — HIGH (ref 80–100)
NRBC # BLD: 16 /100 WBCS — HIGH (ref 0–0)
PLATELET # BLD AUTO: 213 K/UL — SIGNIFICANT CHANGE UP (ref 150–400)
POTASSIUM SERPL-MCNC: 4.6 MMOL/L — SIGNIFICANT CHANGE UP (ref 3.5–5.3)
POTASSIUM SERPL-SCNC: 4.6 MMOL/L — SIGNIFICANT CHANGE UP (ref 3.5–5.3)
RBC # BLD: 1.95 M/UL — LOW (ref 4.2–5.8)
RBC # BLD: 1.95 M/UL — LOW (ref 4.2–5.8)
RBC # FLD: 28.6 % — HIGH (ref 10.3–14.5)
RETICS #: 470.5 K/UL — HIGH (ref 25–125)
RETICS/RBC NFR: 24.1 % — HIGH (ref 0.5–2.5)
RH IG SCN BLD-IMP: POSITIVE — SIGNIFICANT CHANGE UP
SODIUM SERPL-SCNC: 142 MMOL/L — SIGNIFICANT CHANGE UP (ref 135–145)
TACROLIMUS SERPL-MCNC: 5.8 NG/ML — SIGNIFICANT CHANGE UP
TACROLIMUS SERPL-MCNC: 8.2 NG/ML — SIGNIFICANT CHANGE UP
WBC # BLD: 21.29 K/UL — HIGH (ref 3.8–10.5)
WBC # FLD AUTO: 21.29 K/UL — HIGH (ref 3.8–10.5)

## 2023-01-11 RX ADMIN — LOSARTAN POTASSIUM 100 MILLIGRAM(S): 100 TABLET, FILM COATED ORAL at 05:08

## 2023-01-11 RX ADMIN — Medication 200 MILLIGRAM(S): at 05:08

## 2023-01-11 RX ADMIN — TACROLIMUS 5 MILLIGRAM(S): 5 CAPSULE ORAL at 22:00

## 2023-01-11 RX ADMIN — Medication 1 MILLIGRAM(S): at 09:29

## 2023-01-11 RX ADMIN — Medication 75 MILLIGRAM(S): at 05:08

## 2023-01-11 RX ADMIN — ATORVASTATIN CALCIUM 10 MILLIGRAM(S): 80 TABLET, FILM COATED ORAL at 22:00

## 2023-01-11 RX ADMIN — PANTOPRAZOLE SODIUM 40 MILLIGRAM(S): 20 TABLET, DELAYED RELEASE ORAL at 05:07

## 2023-01-11 RX ADMIN — TACROLIMUS 5 MILLIGRAM(S): 5 CAPSULE ORAL at 09:30

## 2023-01-11 NOTE — PHYSICAL THERAPY INITIAL EVALUATION ADULT - THERAPY FREQUENCY, PT EVAL
Treatment time: 210    Net UF: 3000    Pre weight: 122   Post weight: 116  EDW: 118    Access used: L CVC  Access function: well    Medications or blood products given: none    Regular outpatient schedule: MWF    Summary of response to treatment: tolerated  Treatment well vvs   Removed 3 liters over filtrated  Copy of dialysis treatment record placed in chart, to be scanned into EMR. 1-2x/week

## 2023-01-11 NOTE — PHYSICAL THERAPY INITIAL EVALUATION ADULT - STRENGTHENING, PT EVAL
GOAL: Pt will improve bilateral LE strength to 5/5, for increased limb stability, to improve gait and facilitate stair negotiation in 4 weeks.

## 2023-01-11 NOTE — PHYSICAL THERAPY INITIAL EVALUATION ADULT - PLANNED THERAPY INTERVENTIONS, PT EVAL
GOAL: Pt will be able to Negotiate up/down 10 steps, independently, w/ unilateral rail/and appropriate assistive device, w/reciprocal/step-to gait pattern, in 4 weeks./strengthening

## 2023-01-11 NOTE — PROGRESS NOTE ADULT - ASSESSMENT
70 male h/o hemolytic anemia, NICM s/p heart transplnt, hep c, here with anemia    hemolytic anemia  heme consulted  s/p 40mg PO dexamethasone x4 days.   s/p ivg  will consider Rituxan  - Hemolysis labs daily  hold transfusion unless symptomatic.  s/p 1 unit prbc  now on prednisone as per heme.  cont to monitor     s/p heart transplant  cont transplant meds  cont home cardiac meds  transplant cardiology f/u  repeat echo unchanged    ckd  stable  renal following    dc planning if no further intervention planned by heme with close outpt f/u      Advanced care planning was discussed with patient and family.  Advanced care planning forms were reviewed and discussed as appropriate.  Differential diagnosis and plan of care discussed with patient after the evaluation.   Pain assessed and judicious use of narcotics when appropriate was discussed.  Importance of Fall prevention discussed.  Counseling on Smoking and Alcohol cessation was offered when appropriate.  Counseling on Diet, exercise, and medication compliance was done.   Approx 30 minutes spent.     70 male h/o hemolytic anemia, NICM s/p heart transplnt, hep c, here with anemia    hemolytic anemia  heme consulted  s/p 40mg PO dexamethasone x4 days.   s/p ivg  will consider Rituxan  - Hemolysis labs daily  hold transfusion unless symptomatic.  s/p 1 unit prbc  now on prednisone as per heme.  cont to monitor     s/p heart transplant  cont transplant meds  cont home cardiac meds  transplant cardiology f/u  repeat echo unchanged    ckd  stable  renal following    dc planning if no further intervention planned by heme with close outpt f/u, if ok with heme       Advanced care planning was discussed with patient and family.  Advanced care planning forms were reviewed and discussed as appropriate.  Differential diagnosis and plan of care discussed with patient after the evaluation.   Pain assessed and judicious use of narcotics when appropriate was discussed.  Importance of Fall prevention discussed.  Counseling on Smoking and Alcohol cessation was offered when appropriate.  Counseling on Diet, exercise, and medication compliance was done.   Approx 30 minutes spent.

## 2023-01-11 NOTE — PHYSICAL THERAPY INITIAL EVALUATION ADULT - ADDITIONAL COMMENTS
Pt resides in a private home with brother (Rivas lipscomb- [phone]), has 3 steps to enter, pt needs assistance with adls, pt has hha. Pt resides in a private home with brother (Rivas lipscomb- [phone]), has 3 steps to enter, +HR, pt resides on first floor. Pt has HHA 5d/6H a day to assist with cooking, cleaning, dressing, sponge bathing as needed. Pt owns RW, SAC, and commode. Pt states he was attending outpt PT PTA. (-) driving.

## 2023-01-11 NOTE — PHYSICAL THERAPY INITIAL EVALUATION ADULT - PERTINENT HX OF CURRENT PROBLEM, REHAB EVAL
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 Drop of Hb from 11.5 to 6.5 in ~3 weeks. CXR: Redemonstration of elevated right hemidiaphragm with right basilar atelectasis.

## 2023-01-12 ENCOUNTER — TRANSCRIPTION ENCOUNTER (OUTPATIENT)
Age: 73
End: 2023-01-12

## 2023-01-12 VITALS
SYSTOLIC BLOOD PRESSURE: 128 MMHG | RESPIRATION RATE: 18 BRPM | HEART RATE: 84 BPM | OXYGEN SATURATION: 96 % | DIASTOLIC BLOOD PRESSURE: 69 MMHG | TEMPERATURE: 99 F

## 2023-01-12 LAB
ANION GAP SERPL CALC-SCNC: 8 MMOL/L — SIGNIFICANT CHANGE UP (ref 5–17)
BILIRUB DIRECT SERPL-MCNC: 0.5 MG/DL — HIGH (ref 0–0.3)
BILIRUB INDIRECT FLD-MCNC: 3.8 MG/DL — HIGH (ref 0.2–1)
BILIRUB SERPL-MCNC: 4.3 MG/DL — HIGH (ref 0.2–1.2)
BUN SERPL-MCNC: 47 MG/DL — HIGH (ref 7–23)
CALCIUM SERPL-MCNC: 8.1 MG/DL — LOW (ref 8.4–10.5)
CHLORIDE SERPL-SCNC: 109 MMOL/L — HIGH (ref 96–108)
CO2 SERPL-SCNC: 23 MMOL/L — SIGNIFICANT CHANGE UP (ref 22–31)
CREAT SERPL-MCNC: 1.48 MG/DL — HIGH (ref 0.5–1.3)
EGFR: 50 ML/MIN/1.73M2 — LOW
GLUCOSE SERPL-MCNC: 100 MG/DL — HIGH (ref 70–99)
HAPTOGLOB SERPL-MCNC: <20 MG/DL — LOW (ref 34–200)
HCT VFR BLD CALC: 21.2 % — LOW (ref 39–50)
HGB BLD-MCNC: 7.4 G/DL — LOW (ref 13–17)
LDH SERPL L TO P-CCNC: 863 U/L — HIGH (ref 50–242)
MCHC RBC-ENTMCNC: 34.9 GM/DL — SIGNIFICANT CHANGE UP (ref 32–36)
MCHC RBC-ENTMCNC: 37.4 PG — HIGH (ref 27–34)
MCV RBC AUTO: 107.1 FL — HIGH (ref 80–100)
NRBC # BLD: 13 /100 WBCS — HIGH (ref 0–0)
PLATELET # BLD AUTO: 191 K/UL — SIGNIFICANT CHANGE UP (ref 150–400)
POTASSIUM SERPL-MCNC: 4.6 MMOL/L — SIGNIFICANT CHANGE UP (ref 3.5–5.3)
POTASSIUM SERPL-SCNC: 4.6 MMOL/L — SIGNIFICANT CHANGE UP (ref 3.5–5.3)
RBC # BLD: 1.98 M/UL — LOW (ref 4.2–5.8)
RBC # BLD: 1.98 M/UL — LOW (ref 4.2–5.8)
RBC # FLD: 30 % — HIGH (ref 10.3–14.5)
RETICS #: 504.5 K/UL — HIGH (ref 25–125)
RETICS/RBC NFR: 25.5 % — HIGH (ref 0.5–2.5)
SODIUM SERPL-SCNC: 140 MMOL/L — SIGNIFICANT CHANGE UP (ref 135–145)
TACROLIMUS SERPL-MCNC: 5.3 NG/ML — SIGNIFICANT CHANGE UP
WBC # BLD: 19.69 K/UL — HIGH (ref 3.8–10.5)
WBC # FLD AUTO: 19.69 K/UL — HIGH (ref 3.8–10.5)

## 2023-01-12 PROCEDURE — 99238 HOSP IP/OBS DSCHRG MGMT 30/<: CPT | Mod: GC

## 2023-01-12 RX ORDER — TACROLIMUS 5 MG/1
1 CAPSULE ORAL
Qty: 60 | Refills: 0
Start: 2023-01-12 | End: 2023-02-10

## 2023-01-12 RX ORDER — TACROLIMUS 5 MG/1
1 CAPSULE ORAL
Qty: 0 | Refills: 0 | DISCHARGE

## 2023-01-12 RX ORDER — POLYETHYLENE GLYCOL 3350 17 G/17G
17 POWDER, FOR SOLUTION ORAL
Qty: 0 | Refills: 0 | DISCHARGE
Start: 2023-01-12

## 2023-01-12 RX ORDER — PATIROMER 8.4 G/1
1 POWDER, FOR SUSPENSION ORAL
Qty: 0 | Refills: 0 | DISCHARGE

## 2023-01-12 RX ORDER — ASPIRIN/CALCIUM CARB/MAGNESIUM 324 MG
1 TABLET ORAL
Qty: 0 | Refills: 0 | DISCHARGE

## 2023-01-12 RX ADMIN — TACROLIMUS 5 MILLIGRAM(S): 5 CAPSULE ORAL at 08:36

## 2023-01-12 RX ADMIN — Medication 75 MILLIGRAM(S): at 06:05

## 2023-01-12 RX ADMIN — Medication 1 MILLIGRAM(S): at 08:36

## 2023-01-12 RX ADMIN — LOSARTAN POTASSIUM 100 MILLIGRAM(S): 100 TABLET, FILM COATED ORAL at 06:06

## 2023-01-12 RX ADMIN — Medication 200 MILLIGRAM(S): at 06:04

## 2023-01-12 RX ADMIN — PANTOPRAZOLE SODIUM 40 MILLIGRAM(S): 20 TABLET, DELAYED RELEASE ORAL at 06:04

## 2023-01-12 NOTE — DISCHARGE NOTE PROVIDER - HOSPITAL COURSE
70 male h/o hemolytic anemia, NICM s/p heart transplnt, hep c, here with anemia    hemolytic anemia  heme consulted  s/p 40mg PO dexamethasone x4 days.   s/p ivg  will consider Rituxan  - Hemolysis labs daily  hold transfusion unless symptomatic.  s/p 1 unit prbc  now on prednisone as per heme.  cont to monitor     s/p heart transplant  cont transplant meds  cont home cardiac meds  transplant cardiology f/u  repeat echo unchanged    ckd  stable  renal following    dc planning if no further intervention planned by heme with close outpt f/u, if ok with heme.  To continue taking prednisone 75mg daily until follow up with Dr. Rosario who will decide taper regimen.

## 2023-01-12 NOTE — DISCHARGE NOTE PROVIDER - NSDCCPCAREPLAN_GEN_ALL_CORE_FT
PRINCIPAL DISCHARGE DIAGNOSIS  Diagnosis: Acute on chronic anemia  Assessment and Plan of Treatment: Received 1 unit PRBC while inpatient, steroids and IVIG. Hemoglobin on date of discharge = 7.4.  Take your prednisone as prescribed until your follow up with Dr. Rosario.  Follow up with Dr. Rosario within 1-2 weeks of discharge.  Please call to schedule your appointment.      SECONDARY DISCHARGE DIAGNOSES  Diagnosis: S/P orthotopic heart transplant  Assessment and Plan of Treatment: Take your medications daily as prescribed.    Diagnosis: Chronic kidney disease, unspecified CKD stage  Assessment and Plan of Treatment: Avoid taking (NSAIDs) - (ex: Ibuprofen, Advil, Celebrex, Naprosyn)  Avoid taking any nephrotoxic agents (can harm kidneys) - Intravenous contrast for diagnostic testing, combination cold medications.  Have all medications adjusted for your renal function by your Health Care Provider.  Blood pressure control is important.  Take all medication as prescribed.

## 2023-01-12 NOTE — DISCHARGE NOTE PROVIDER - CARE PROVIDER_API CALL
Tao Rosario)  Hematology; Internal Medicine; Medical Oncology  [address] [place] [address]  Phone: [phone]  Fax: [phone]  Follow Up Time: 2 weeks    Marvin Campbell  Cardiovascular Diseases  [address] [place] [address]  Phone: [phone]  Fax: [phone]  Follow Up Time: 1 week

## 2023-01-12 NOTE — PHARMACOTHERAPY INTERVENTION NOTE - COMMENTS
Heart Transplant Immunosuppression    Tacrolimus trough is 5.3. The patient has been on tacrolimus 5 mG PO twice a day - will continue the same dose. Will follow up on tacrolimus trough levels.     Debbie Dowell, Pharm.D.  [phone]

## 2023-01-12 NOTE — PROGRESS NOTE ADULT - SUBJECTIVE AND OBJECTIVE BOX
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 Drop of Hb from 11.5 to 6.5 in ~3 weeks.     Pt is AOx3 but poor historian; unable to provide more information on why he was sent in. Pt endorse more exertional fatigue and sob in the last few days. Denies bleeding, hematuria, dark tarry stools. Pt had one prior colonoscopy "a long time ago". Pt had normal brown BM this AM (04 Jan 2023 21:44)      Patient seen and evaluated at bedside. No acute events overnight except as noted.    Interval HPI: no acute events o/n      PAST MEDICAL & SURGICAL HISTORY:  HTN      SVT (Supraventricular Tachycardia)      Non-Ischemic Cardiomyopathy  now s/p transplant 2018      GIB (gastrointestinal bleeding)      Hepatitis C virus      DVT of upper extremity (deep vein thrombosis)      Former smoker      HLD (hyperlipidemia)      Knee pain, right      H/O autoimmune hemolytic anemia      H/O hemolytic anemia      Status post left hip replacement      H/O heart transplant  2/2018          REVIEW OF SYSTEMS:  as per hpi    VITALS:   Vital Signs Last 24 Hrs  T(C): 36.6 (08 Jan 2023 17:53), Max: 36.7 (08 Jan 2023 05:00)  T(F): 97.8 (08 Jan 2023 17:53), Max: 98.1 (08 Jan 2023 05:00)  HR: 89 (08 Jan 2023 17:53) (83 - 98)  BP: 118/77 (08 Jan 2023 17:53) (109/74 - 132/82)  BP(mean): --  RR: 17 (08 Jan 2023 17:53) (16 - 18)  SpO2: 96% (08 Jan 2023 17:53) (96% - 100%)    Parameters below as of 08 Jan 2023 17:53  Patient On (Oxygen Delivery Method): room air          PHYSICAL EXAM:  GENERAL: NAD, well-developed  HEAD:  Atraumatic, Normocephalic  EYES: EOMI, PERRLA, conjunctiva and sclera clear  NECK: Supple, No JVD  CHEST/LUNG: Clear to auscultation bilaterally; No wheeze  HEART: S1, S2; No murmurs, rubs, or gallops  ABDOMEN: Soft, Nontender, Nondistended; Bowel sounds present  EXTREMITIES:  2+ Peripheral Pulses, No clubbing, cyanosis, or edema  PSYCH: Normal affect  NEUROLOGY: AAOX3; non-focal  SKIN: No rashes or lesions    Consultant(s) Notes Reviewed:  [x ] YES  [ ] NO  Care Discussed with Consultants/Other Providers [ x] YES  [ ] NO    MEDS:   MEDICATIONS  (STANDING):  atorvastatin 10 milliGRAM(s) Oral at bedtime  dexAMETHasone     Tablet 40 milliGRAM(s) Oral daily  folic acid 1 milliGRAM(s) Oral daily  losartan 100 milliGRAM(s) Oral daily  metoprolol succinate  milliGRAM(s) Oral daily  pantoprazole    Tablet 40 milliGRAM(s) Oral before breakfast  tacrolimus 5 milliGRAM(s) Oral <User Schedule>    MEDICATIONS  (PRN):  polyethylene glycol 3350 17 Gram(s) Oral daily PRN Constipation      ALLERGIES:  No Known Allergies      LABS:                                    6.1    14.81 )-----------( 222      ( 08 Jan 2023 07:33 )             18.2   01-08    134<L>  |  105  |  67<H>  ----------------------------<  91  5.2   |  18<L>  |  1.77<H>    Ca    7.7<L>      08 Jan 2023 07:33    TPro  x   /  Alb  x   /  TBili  4.4<H>  /  DBili  0.5<H>  /  AST  x   /  ALT  x   /  AlkPhos  x   01-08         < from: Transthoracic Echocardiogram (01.05.23 @ 10:48) >  ---------------  Conclusions:  1. Mitral annular calcification, otherwise normal mitral  valve. Mild mitral regurgitation.  2. Normal trileaflet aortic valve. No aortic valve  regurgitation seen.  3. Normal left atrium.  LA volume index = 34 cc/m2.  4. Overall preserved left ventricular systolic function.  The basal  inferolateral wall, the basal inferior wall, and  the mid inferior wall are hypokinetic.  5. Global longitudinal strain measures -19.1%.  GLS was  measured on the Ed EPIQ.  6. Normal right ventricular size and function.  *** Compared with echocardiogram of 7/25/2022, no  significant changes noted.  --------------------------------------------------------------    < end of copied text >  
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 Drop of Hb from 11.5 to 6.5 in ~3 weeks.     Pt is AOx3 but poor historian; unable to provide more information on why he was sent in. Pt endorse more exertional fatigue and sob in the last few days. Denies bleeding, hematuria, dark tarry stools. Pt had one prior colonoscopy "a long time ago". Pt had normal brown BM this AM (04 Jan 2023 21:44)      Patient seen and evaluated at bedside. No acute events overnight except as noted.    Interval HPI: no acute events o/n   currently receiving prbc transfusion.    PAST MEDICAL & SURGICAL HISTORY:  HTN      SVT (Supraventricular Tachycardia)      Non-Ischemic Cardiomyopathy  now s/p transplant 2018      GIB (gastrointestinal bleeding)      Hepatitis C virus      DVT of upper extremity (deep vein thrombosis)      Former smoker      HLD (hyperlipidemia)      Knee pain, right      H/O autoimmune hemolytic anemia      H/O hemolytic anemia      Status post left hip replacement      H/O heart transplant  2/2018          REVIEW OF SYSTEMS:  as per hpi    VITALS:   Vital Signs Last 24 Hrs  T(C): 36.3 (07 Jan 2023 21:45), Max: 36.8 (07 Jan 2023 17:30)  T(F): 97.4 (07 Jan 2023 21:45), Max: 98.2 (07 Jan 2023 17:30)  HR: 98 (07 Jan 2023 21:45) (86 - 98)  BP: 120/78 (07 Jan 2023 21:45) (100/63 - 120/78)  BP(mean): --  RR: 18 (07 Jan 2023 21:45) (18 - 18)  SpO2: 98% (07 Jan 2023 21:45) (97% - 99%)    Parameters below as of 07 Jan 2023 21:45  Patient On (Oxygen Delivery Method): room air        PHYSICAL EXAM:  GENERAL: NAD, well-developed  HEAD:  Atraumatic, Normocephalic  EYES: EOMI, PERRLA, conjunctiva and sclera clear  NECK: Supple, No JVD  CHEST/LUNG: Clear to auscultation bilaterally; No wheeze  HEART: S1, S2; No murmurs, rubs, or gallops  ABDOMEN: Soft, Nontender, Nondistended; Bowel sounds present  EXTREMITIES:  2+ Peripheral Pulses, No clubbing, cyanosis, or edema  PSYCH: Normal affect  NEUROLOGY: AAOX3; non-focal  SKIN: No rashes or lesions    Consultant(s) Notes Reviewed:  [x ] YES  [ ] NO  Care Discussed with Consultants/Other Providers [ x] YES  [ ] NO    MEDS:   MEDICATIONS  (STANDING):  acetaminophen     Tablet .. 650 milliGRAM(s) Oral once  atorvastatin 10 milliGRAM(s) Oral at bedtime  dexAMETHasone     Tablet 40 milliGRAM(s) Oral daily  diphenhydrAMINE 25 milliGRAM(s) Oral once  folic acid 1 milliGRAM(s) Oral daily  immune   globulin 10% (GAMMAGARD) IVPB 70 Gram(s) IV Intermittent once  losartan 100 milliGRAM(s) Oral daily  metoprolol succinate  milliGRAM(s) Oral daily  pantoprazole    Tablet 40 milliGRAM(s) Oral before breakfast  tacrolimus 4 milliGRAM(s) Oral <User Schedule>    MEDICATIONS  (PRN):  polyethylene glycol 3350 17 Gram(s) Oral daily PRN Constipation      ALLERGIES:  No Known Allergies      LABS:                                                                    5.4    16.72 )-----------( 222      ( 07 Jan 2023 07:19 )             16.0   01-07    137  |  104  |  65<H>  ----------------------------<  98  4.9   |  19<L>  |  1.99<H>    Ca    8.2<L>      07 Jan 2023 07:17    TPro  x   /  Alb  x   /  TBili  4.8<H>  /  DBili  0.5<H>  /  AST  x   /  ALT  x   /  AlkPhos  x   01-07       < from: Transthoracic Echocardiogram (01.05.23 @ 10:48) >  ---------------  Conclusions:  1. Mitral annular calcification, otherwise normal mitral  valve. Mild mitral regurgitation.  2. Normal trileaflet aortic valve. No aortic valve  regurgitation seen.  3. Normal left atrium.  LA volume index = 34 cc/m2.  4. Overall preserved left ventricular systolic function.  The basal  inferolateral wall, the basal inferior wall, and  the mid inferior wall are hypokinetic.  5. Global longitudinal strain measures -19.1%.  GLS was  measured on the Ed EPIQ.  6. Normal right ventricular size and function.  *** Compared with echocardiogram of 7/25/2022, no  significant changes noted.  --------------------------------------------------------------    < end of copied text >  
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 Drop of Hb from 11.5 to 6.5 in ~3 weeks.     Pt is AOx3 but poor historian; unable to provide more information on why he was sent in. Pt endorse more exertional fatigue and sob in the last few days. Denies bleeding, hematuria, dark tarry stools. Pt had one prior colonoscopy "a long time ago". Pt had normal brown BM this AM (04 Jan 2023 21:44)      Patient seen and evaluated at bedside. No acute events overnight except as noted.    Interval HPI: no acute events o/n      PAST MEDICAL & SURGICAL HISTORY:  HTN      SVT (Supraventricular Tachycardia)      Non-Ischemic Cardiomyopathy  now s/p transplant 2018      GIB (gastrointestinal bleeding)      Hepatitis C virus      DVT of upper extremity (deep vein thrombosis)      Former smoker      HLD (hyperlipidemia)      Knee pain, right      H/O autoimmune hemolytic anemia      H/O hemolytic anemia      Status post left hip replacement      H/O heart transplant  2/2018          REVIEW OF SYSTEMS:  as per hpi    VITALS:   Vital Signs Last 24 Hrs  T(C): 36.6 (11 Jan 2023 05:23), Max: 37.1 (10 Jameel 2023 17:41)  T(F): 97.9 (11 Jan 2023 05:23), Max: 98.7 (10 Jameel 2023 17:41)  HR: 70 (11 Jan 2023 11:14) (70 - 100)  BP: 112/53 (11 Jan 2023 11:14) (112/53 - 132/83)  BP(mean): --  RR: 18 (11 Jan 2023 05:23) (18 - 18)  SpO2: 95% (11 Jan 2023 11:14) (95% - 97%)    Parameters below as of 11 Jan 2023 11:14  Patient On (Oxygen Delivery Method): room air            PHYSICAL EXAM:  GENERAL: NAD, well-developed  HEAD:  Atraumatic, Normocephalic  EYES: EOMI, PERRLA, conjunctiva and sclera clear  NECK: Supple, No JVD  CHEST/LUNG: Clear to auscultation bilaterally; No wheeze  HEART: S1, S2; No murmurs, rubs, or gallops  ABDOMEN: Soft, Nontender, Nondistended; Bowel sounds present  EXTREMITIES:  2+ Peripheral Pulses, No clubbing, cyanosis, or edema  PSYCH: Normal affect  NEUROLOGY: AAOX3; non-focal  SKIN: No rashes or lesions    Consultant(s) Notes Reviewed:  [x ] YES  [ ] NO  Care Discussed with Consultants/Other Providers [ x] YES  [ ] NO    MEDS:   MEDICATIONS  (STANDING):  atorvastatin 10 milliGRAM(s) Oral at bedtime  folic acid 1 milliGRAM(s) Oral daily  losartan 100 milliGRAM(s) Oral daily  metoprolol succinate  milliGRAM(s) Oral daily  pantoprazole    Tablet 40 milliGRAM(s) Oral before breakfast  predniSONE   Tablet 75 milliGRAM(s) Oral daily  tacrolimus 5 milliGRAM(s) Oral <User Schedule>    MEDICATIONS  (PRN):  polyethylene glycol 3350 17 Gram(s) Oral daily PRN Constipation      ALLERGIES:  No Known Allergies      LABS:                                    7.1    21.29 )-----------( 213      ( 11 Jan 2023 08:52 )             20.5   01-11    142  |  111<H>  |  48<H>  ----------------------------<  123<H>  4.6   |  20<L>  |  1.41<H>    Ca    8.1<L>      11 Jan 2023 08:52    TPro  x   /  Alb  x   /  TBili  5.0<H>  /  DBili  0.5<H>  /  AST  x   /  ALT  x   /  AlkPhos  x   01-11         < from: Transthoracic Echocardiogram (01.05.23 @ 10:48) >  ---------------  Conclusions:  1. Mitral annular calcification, otherwise normal mitral  valve. Mild mitral regurgitation.  2. Normal trileaflet aortic valve. No aortic valve  regurgitation seen.  3. Normal left atrium.  LA volume index = 34 cc/m2.  4. Overall preserved left ventricular systolic function.  The basal  inferolateral wall, the basal inferior wall, and  the mid inferior wall are hypokinetic.  5. Global longitudinal strain measures -19.1%.  GLS was  measured on the Ed EPIQ.  6. Normal right ventricular size and function.  *** Compared with echocardiogram of 7/25/2022, no  significant changes noted.  --------------------------------------------------------------    < end of copied text >  
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 Drop of Hb from 11.5 to 6.5 in ~3 weeks.     Pt is AOx3 but poor historian; unable to provide more information on why he was sent in. Pt endorse more exertional fatigue and sob in the last few days. Denies bleeding, hematuria, dark tarry stools. Pt had one prior colonoscopy "a long time ago". Pt had normal brown BM this AM (04 Jan 2023 21:44)      Patient seen and evaluated at bedside. No acute events overnight except as noted.    Interval HPI: no acute events o/n      PAST MEDICAL & SURGICAL HISTORY:  HTN      SVT (Supraventricular Tachycardia)      Non-Ischemic Cardiomyopathy  now s/p transplant 2018      GIB (gastrointestinal bleeding)      Hepatitis C virus      DVT of upper extremity (deep vein thrombosis)      Former smoker      HLD (hyperlipidemia)      Knee pain, right      H/O autoimmune hemolytic anemia      H/O hemolytic anemia      Status post left hip replacement      H/O heart transplant  2/2018          REVIEW OF SYSTEMS:  as per hpi    VITALS:   Vital Signs Last 24 Hrs  T(C): 36.5 (10 Jameel 2023 08:55), Max: 37.5 (09 Jan 2023 18:42)  T(F): 97.7 (10 Jameel 2023 08:55), Max: 99.5 (09 Jan 2023 18:42)  HR: 88 (10 Jameel 2023 08:55) (86 - 100)  BP: 117/76 (10 Jameel 2023 08:55) (110/69 - 137/84)  BP(mean): --  RR: 18 (10 Jameel 2023 08:55) (18 - 18)  SpO2: 98% (10 Jameel 2023 08:55) (94% - 98%)    Parameters below as of 10 Jameel 2023 08:55  Patient On (Oxygen Delivery Method): room air            PHYSICAL EXAM:  GENERAL: NAD, well-developed  HEAD:  Atraumatic, Normocephalic  EYES: EOMI, PERRLA, conjunctiva and sclera clear  NECK: Supple, No JVD  CHEST/LUNG: Clear to auscultation bilaterally; No wheeze  HEART: S1, S2; No murmurs, rubs, or gallops  ABDOMEN: Soft, Nontender, Nondistended; Bowel sounds present  EXTREMITIES:  2+ Peripheral Pulses, No clubbing, cyanosis, or edema  PSYCH: Normal affect  NEUROLOGY: AAOX3; non-focal  SKIN: No rashes or lesions    Consultant(s) Notes Reviewed:  [x ] YES  [ ] NO  Care Discussed with Consultants/Other Providers [ x] YES  [ ] NO    MEDS:   MEDICATIONS  (STANDING):  atorvastatin 10 milliGRAM(s) Oral at bedtime  folic acid 1 milliGRAM(s) Oral daily  losartan 100 milliGRAM(s) Oral daily  metoprolol succinate  milliGRAM(s) Oral daily  pantoprazole    Tablet 40 milliGRAM(s) Oral before breakfast  predniSONE   Tablet 75 milliGRAM(s) Oral daily  tacrolimus 5 milliGRAM(s) Oral <User Schedule>    MEDICATIONS  (PRN):  polyethylene glycol 3350 17 Gram(s) Oral daily PRN Constipation      ALLERGIES:  No Known Allergies      LABS:                                                                    7.0    19.47 )-----------( 223      ( 10 Jameel 2023 08:21 )             21.4   01-09    139  |  111<H>  |  54<H>  ----------------------------<  101<H>  4.7   |  19<L>  |  1.66<H>    Ca    7.8<L>      09 Jan 2023 07:23    TPro  x   /  Alb  x   /  TBili  5.1<H>  /  DBili  0.5<H>  /  AST  x   /  ALT  x   /  AlkPhos  x   01-10       < from: Transthoracic Echocardiogram (01.05.23 @ 10:48) >  ---------------  Conclusions:  1. Mitral annular calcification, otherwise normal mitral  valve. Mild mitral regurgitation.  2. Normal trileaflet aortic valve. No aortic valve  regurgitation seen.  3. Normal left atrium.  LA volume index = 34 cc/m2.  4. Overall preserved left ventricular systolic function.  The basal  inferolateral wall, the basal inferior wall, and  the mid inferior wall are hypokinetic.  5. Global longitudinal strain measures -19.1%.  GLS was  measured on the Ed EPIQ.  6. Normal right ventricular size and function.  *** Compared with echocardiogram of 7/25/2022, no  significant changes noted.  --------------------------------------------------------------    < end of copied text >  
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 Drop of Hb from 11.5 to 6.5 in ~3 weeks.     Pt is AOx3 but poor historian; unable to provide more information on why he was sent in. Pt endorse more exertional fatigue and sob in the last few days. Denies bleeding, hematuria, dark tarry stools. Pt had one prior colonoscopy "a long time ago". Pt had normal brown BM this AM (2023 21:44)      Patient seen and evaluated at bedside. No acute events overnight except as noted.    Interval HPI: no acute events o/n     PAST MEDICAL & SURGICAL HISTORY:  HTN      SVT (Supraventricular Tachycardia)      Non-Ischemic Cardiomyopathy  now s/p transplant       GIB (gastrointestinal bleeding)      Hepatitis C virus      DVT of upper extremity (deep vein thrombosis)      Former smoker      HLD (hyperlipidemia)      Knee pain, right      H/O autoimmune hemolytic anemia      H/O hemolytic anemia      Status post left hip replacement      H/O heart transplant  2018          REVIEW OF SYSTEMS:  as per hpi    VITALS:  Vital Signs Last 24 Hrs  T(C): 36.6 (2023 15:46), Max: 36.9 (2023 23:25)  T(F): 97.8 (2023 15:46), Max: 98.5 (2023 23:25)  HR: 107 (2023 15:46) (99 - 117)  BP: 111/74 (2023 15:46) (104/71 - 120/80)  BP(mean): --  RR: 17 (2023 15:46) (15 - 19)  SpO2: 99% (2023 15:46) (97% - 99%)    Parameters below as of 2023 15:46  Patient On (Oxygen Delivery Method): room air      CAPILLARY BLOOD GLUCOSE        I&O's Summary      PHYSICAL EXAM:  GENERAL: NAD, well-developed  HEAD:  Atraumatic, Normocephalic  EYES: EOMI, PERRLA, conjunctiva and sclera clear  NECK: Supple, No JVD  CHEST/LUNG: Clear to auscultation bilaterally; No wheeze  HEART: S1, S2; No murmurs, rubs, or gallops  ABDOMEN: Soft, Nontender, Nondistended; Bowel sounds present  EXTREMITIES:  2+ Peripheral Pulses, No clubbing, cyanosis, or edema  PSYCH: Normal affect  NEUROLOGY: AAOX3; non-focal  SKIN: No rashes or lesions    Consultant(s) Notes Reviewed:  [x ] YES  [ ] NO  Care Discussed with Consultants/Other Providers [ x] YES  [ ] NO    MEDS:  MEDICATIONS  (STANDING):  dexAMETHasone     Tablet 40 milliGRAM(s) Oral daily  losartan 100 milliGRAM(s) Oral daily  metoprolol succinate  milliGRAM(s) Oral daily  pantoprazole    Tablet 40 milliGRAM(s) Oral before breakfast  tacrolimus 1 milliGRAM(s) Oral every 12 hours    MEDICATIONS  (PRN):    ALLERGIES:  No Known Allergies      LABS:                        6.8    10.46 )-----------( 172      ( 2023 06:37 )             19.2         141  |  105  |  45<H>  ----------------------------<  167<H>  4.5   |  21<L>  |  1.61<H>    Ca    8.9      2023 06:37    TPro  x   /  Alb  x   /  TBili  6.1<H>  /  DBili  0.7<H>  /  AST  x   /  ALT  x   /  AlkPhos  x       PT/INR - ( 2023 14:43 )   PT: 12.7 sec;   INR: 1.10 ratio         PTT - ( 2023 14:43 )  PTT:18.2 sec      LIVER FUNCTIONS - ( 2023 17:28 )  Alb: 4.2 g/dL / Pro: 6.6 g/dL / ALK PHOS: 54 U/L / ALT: 10 U/L / AST: 45 U/L / GGT: x           Urinalysis Basic - ( 2023 16:43 )    Color: Yellow / Appearance: Clear / S.021 / pH: x  Gluc: x / Ketone: Negative  / Bili: Negative / Urobili: Negative   Blood: x / Protein: 30 mg/dL / Nitrite: Negative   Leuk Esterase: Negative / RBC: 1 /hpf / WBC 1 /HPF   Sq Epi: x / Non Sq Epi: 0 /hpf / Bacteria: Negative     < from: Transthoracic Echocardiogram (23 @ 10:48) >  ---------------  Conclusions:  1. Mitral annular calcification, otherwise normal mitral  valve. Mild mitral regurgitation.  2. Normal trileaflet aortic valve. No aortic valve  regurgitation seen.  3. Normal left atrium.  LA volume index = 34 cc/m2.  4. Overall preserved left ventricular systolic function.  The basal  inferolateral wall, the basal inferior wall, and  the mid inferior wall are hypokinetic.  5. Global longitudinal strain measures -19.1%.  GLS was  measured on the Ed EPIQ.  6. Normal right ventricular size and function.  *** Compared with echocardiogram of 2022, no  significant changes noted.  --------------------------------------------------------------    < end of copied text >  
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 Drop of Hb from 11.5 to 6.5 in ~3 weeks.     Pt is AOx3 but poor historian; unable to provide more information on why he was sent in. Pt endorse more exertional fatigue and sob in the last few days. Denies bleeding, hematuria, dark tarry stools. Pt had one prior colonoscopy "a long time ago". Pt had normal brown BM this AM (04 Jan 2023 21:44)      Patient seen and evaluated at bedside. No acute events overnight except as noted.    Interval HPI: no acute events o/n     PAST MEDICAL & SURGICAL HISTORY:  HTN      SVT (Supraventricular Tachycardia)      Non-Ischemic Cardiomyopathy  now s/p transplant 2018      GIB (gastrointestinal bleeding)      Hepatitis C virus      DVT of upper extremity (deep vein thrombosis)      Former smoker      HLD (hyperlipidemia)      Knee pain, right      H/O autoimmune hemolytic anemia      H/O hemolytic anemia      Status post left hip replacement      H/O heart transplant  2/2018          REVIEW OF SYSTEMS:  as per hpi    VITALS:   Vital Signs Last 24 Hrs  T(C): 36.7 (06 Jan 2023 06:08), Max: 37 (05 Jan 2023 22:11)  T(F): 98.1 (06 Jan 2023 06:08), Max: 98.6 (05 Jan 2023 22:11)  HR: 102 (06 Jan 2023 06:08) (102 - 117)  BP: 110/73 (06 Jan 2023 06:08) (110/73 - 131/81)  BP(mean): --  RR: 18 (06 Jan 2023 06:08) (17 - 18)  SpO2: 98% (06 Jan 2023 06:08) (96% - 99%)    Parameters below as of 06 Jan 2023 06:08  Patient On (Oxygen Delivery Method): room air          PHYSICAL EXAM:  GENERAL: NAD, well-developed  HEAD:  Atraumatic, Normocephalic  EYES: EOMI, PERRLA, conjunctiva and sclera clear  NECK: Supple, No JVD  CHEST/LUNG: Clear to auscultation bilaterally; No wheeze  HEART: S1, S2; No murmurs, rubs, or gallops  ABDOMEN: Soft, Nontender, Nondistended; Bowel sounds present  EXTREMITIES:  2+ Peripheral Pulses, No clubbing, cyanosis, or edema  PSYCH: Normal affect  NEUROLOGY: AAOX3; non-focal  SKIN: No rashes or lesions    Consultant(s) Notes Reviewed:  [x ] YES  [ ] NO  Care Discussed with Consultants/Other Providers [ x] YES  [ ] NO    MEDS:   MEDICATIONS  (STANDING):  atorvastatin 10 milliGRAM(s) Oral at bedtime  dexAMETHasone     Tablet 40 milliGRAM(s) Oral daily  folic acid 1 milliGRAM(s) Oral daily  losartan 100 milliGRAM(s) Oral daily  metoprolol succinate  milliGRAM(s) Oral daily  pantoprazole    Tablet 40 milliGRAM(s) Oral before breakfast  tacrolimus 3 milliGRAM(s) Oral <User Schedule>    MEDICATIONS  (PRN):  polyethylene glycol 3350 17 Gram(s) Oral daily PRN Constipation      ALLERGIES:  No Known Allergies      LABS:                                               6.0    16.46 )-----------( 197      ( 06 Jan 2023 10:44 )             18.2   01-06    141  |  106  |  53<H>  ----------------------------<  102<H>  4.6   |  22  |  1.88<H>    Ca    8.8      06 Jan 2023 07:21    TPro  6.8  /  Alb  4.1  /  TBili  5.5<H>  /  DBili  0.5<H>  /  AST  28  /  ALT  12  /  AlkPhos  53  01-06       < from: Transthoracic Echocardiogram (01.05.23 @ 10:48) >  ---------------  Conclusions:  1. Mitral annular calcification, otherwise normal mitral  valve. Mild mitral regurgitation.  2. Normal trileaflet aortic valve. No aortic valve  regurgitation seen.  3. Normal left atrium.  LA volume index = 34 cc/m2.  4. Overall preserved left ventricular systolic function.  The basal  inferolateral wall, the basal inferior wall, and  the mid inferior wall are hypokinetic.  5. Global longitudinal strain measures -19.1%.  GLS was  measured on the Ed EPIQ.  6. Normal right ventricular size and function.  *** Compared with echocardiogram of 7/25/2022, no  significant changes noted.  --------------------------------------------------------------    < end of copied text >  
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 Drop of Hb from 11.5 to 6.5 in ~3 weeks.     Pt is AOx3 but poor historian; unable to provide more information on why he was sent in. Pt endorse more exertional fatigue and sob in the last few days. Denies bleeding, hematuria, dark tarry stools. Pt had one prior colonoscopy "a long time ago". Pt had normal brown BM this AM (04 Jan 2023 21:44)      Patient seen and evaluated at bedside. No acute events overnight except as noted.    Interval HPI: no acute events o/n      PAST MEDICAL & SURGICAL HISTORY:  HTN      SVT (Supraventricular Tachycardia)      Non-Ischemic Cardiomyopathy  now s/p transplant 2018      GIB (gastrointestinal bleeding)      Hepatitis C virus      DVT of upper extremity (deep vein thrombosis)      Former smoker      HLD (hyperlipidemia)      Knee pain, right      H/O autoimmune hemolytic anemia      H/O hemolytic anemia      Status post left hip replacement      H/O heart transplant  2/2018          REVIEW OF SYSTEMS:  as per hpi    VITALS:   Vital Signs Last 24 Hrs  T(C): 36.4 (09 Jan 2023 12:47), Max: 36.7 (08 Jan 2023 21:57)  T(F): 97.5 (09 Jan 2023 12:47), Max: 98 (08 Jan 2023 21:57)  HR: 94 (09 Jan 2023 12:47) (89 - 101)  BP: 117/77 (09 Jan 2023 12:47) (117/77 - 130/81)  BP(mean): --  RR: 18 (09 Jan 2023 12:47) (17 - 18)  SpO2: 97% (09 Jan 2023 12:47) (96% - 97%)    Parameters below as of 09 Jan 2023 12:47  Patient On (Oxygen Delivery Method): room air              PHYSICAL EXAM:  GENERAL: NAD, well-developed  HEAD:  Atraumatic, Normocephalic  EYES: EOMI, PERRLA, conjunctiva and sclera clear  NECK: Supple, No JVD  CHEST/LUNG: Clear to auscultation bilaterally; No wheeze  HEART: S1, S2; No murmurs, rubs, or gallops  ABDOMEN: Soft, Nontender, Nondistended; Bowel sounds present  EXTREMITIES:  2+ Peripheral Pulses, No clubbing, cyanosis, or edema  PSYCH: Normal affect  NEUROLOGY: AAOX3; non-focal  SKIN: No rashes or lesions    Consultant(s) Notes Reviewed:  [x ] YES  [ ] NO  Care Discussed with Consultants/Other Providers [ x] YES  [ ] NO    MEDS:   MEDICATIONS  (STANDING):  atorvastatin 10 milliGRAM(s) Oral at bedtime  folic acid 1 milliGRAM(s) Oral daily  losartan 100 milliGRAM(s) Oral daily  metoprolol succinate  milliGRAM(s) Oral daily  pantoprazole    Tablet 40 milliGRAM(s) Oral before breakfast  predniSONE   Tablet 75 milliGRAM(s) Oral daily  tacrolimus 5 milliGRAM(s) Oral <User Schedule>    MEDICATIONS  (PRN):  polyethylene glycol 3350 17 Gram(s) Oral daily PRN Constipation      ALLERGIES:  No Known Allergies      LABS:                                              6.8    16.75 )-----------( 227      ( 09 Jan 2023 07:19 )             18.7   01-09    139  |  111<H>  |  54<H>  ----------------------------<  101<H>  4.7   |  19<L>  |  1.66<H>    Ca    7.8<L>      09 Jan 2023 07:23    TPro  x   /  Alb  x   /  TBili  4.4<H>  /  DBili  0.5<H>  /  AST  x   /  ALT  x   /  AlkPhos  x   01-08       < from: Transthoracic Echocardiogram (01.05.23 @ 10:48) >  ---------------  Conclusions:  1. Mitral annular calcification, otherwise normal mitral  valve. Mild mitral regurgitation.  2. Normal trileaflet aortic valve. No aortic valve  regurgitation seen.  3. Normal left atrium.  LA volume index = 34 cc/m2.  4. Overall preserved left ventricular systolic function.  The basal  inferolateral wall, the basal inferior wall, and  the mid inferior wall are hypokinetic.  5. Global longitudinal strain measures -19.1%.  GLS was  measured on the Ed EPIQ.  6. Normal right ventricular size and function.  *** Compared with echocardiogram of 7/25/2022, no  significant changes noted.  --------------------------------------------------------------    < end of copied text >  
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 Drop of Hb from 11.5 to 6.5 in ~3 weeks.     Pt is AOx3 but poor historian; unable to provide more information on why he was sent in. Pt endorse more exertional fatigue and sob in the last few days. Denies bleeding, hematuria, dark tarry stools. Pt had one prior colonoscopy "a long time ago". Pt had normal brown BM this AM (04 Jan 2023 21:44)      Patient seen and evaluated at bedside. No acute events overnight except as noted.    Interval HPI: no acute events o/n      PAST MEDICAL & SURGICAL HISTORY:  HTN      SVT (Supraventricular Tachycardia)      Non-Ischemic Cardiomyopathy  now s/p transplant 2018      GIB (gastrointestinal bleeding)      Hepatitis C virus      DVT of upper extremity (deep vein thrombosis)      Former smoker      HLD (hyperlipidemia)      Knee pain, right      H/O autoimmune hemolytic anemia      H/O hemolytic anemia      Status post left hip replacement      H/O heart transplant  2/2018          REVIEW OF SYSTEMS:  as per hpi    VITALS:   Vital Signs Last 24 Hrs  T(C): 36.7 (12 Jan 2023 06:08), Max: 36.7 (11 Jan 2023 13:28)  T(F): 98.1 (12 Jan 2023 06:08), Max: 98.1 (12 Jan 2023 06:08)  HR: 96 (12 Jan 2023 06:08) (67 - 96)  BP: 127/74 (12 Jan 2023 06:08) (111/70 - 145/76)  BP(mean): --  RR: 18 (12 Jan 2023 06:08) (17 - 18)  SpO2: 96% (12 Jan 2023 06:08) (96% - 97%)    Parameters below as of 12 Jan 2023 06:08  Patient On (Oxygen Delivery Method): room air          PHYSICAL EXAM:  GENERAL: NAD, well-developed  HEAD:  Atraumatic, Normocephalic  EYES: EOMI, PERRLA, conjunctiva and sclera clear  NECK: Supple, No JVD  CHEST/LUNG: Clear to auscultation bilaterally; No wheeze  HEART: S1, S2; No murmurs, rubs, or gallops  ABDOMEN: Soft, Nontender, Nondistended; Bowel sounds present  EXTREMITIES:  2+ Peripheral Pulses, No clubbing, cyanosis, or edema  PSYCH: Normal affect  NEUROLOGY: AAOX3; non-focal  SKIN: No rashes or lesions    Consultant(s) Notes Reviewed:  [x ] YES  [ ] NO  Care Discussed with Consultants/Other Providers [ x] YES  [ ] NO    MEDS:   MEDICATIONS  (STANDING):  atorvastatin 10 milliGRAM(s) Oral at bedtime  folic acid 1 milliGRAM(s) Oral daily  losartan 100 milliGRAM(s) Oral daily  metoprolol succinate  milliGRAM(s) Oral daily  pantoprazole    Tablet 40 milliGRAM(s) Oral before breakfast  predniSONE   Tablet 75 milliGRAM(s) Oral daily  tacrolimus 5 milliGRAM(s) Oral <User Schedule>    MEDICATIONS  (PRN):  polyethylene glycol 3350 17 Gram(s) Oral daily PRN Constipation        ALLERGIES:  No Known Allergies      LABS:                                                        7.4    19.69 )-----------( 191      ( 12 Jan 2023 07:17 )             21.2   01-12    140  |  109<H>  |  47<H>  ----------------------------<  100<H>  4.6   |  23  |  1.48<H>    Ca    8.1<L>      12 Jan 2023 07:17    TPro  x   /  Alb  x   /  TBili  4.3<H>  /  DBili  0.5<H>  /  AST  x   /  ALT  x   /  AlkPhos  x   01-12           < from: Transthoracic Echocardiogram (01.05.23 @ 10:48) >  ---------------  Conclusions:  1. Mitral annular calcification, otherwise normal mitral  valve. Mild mitral regurgitation.  2. Normal trileaflet aortic valve. No aortic valve  regurgitation seen.  3. Normal left atrium.  LA volume index = 34 cc/m2.  4. Overall preserved left ventricular systolic function.  The basal  inferolateral wall, the basal inferior wall, and  the mid inferior wall are hypokinetic.  5. Global longitudinal strain measures -19.1%.  GLS was  measured on the Ed EPIQ.  6. Normal right ventricular size and function.  *** Compared with echocardiogram of 7/25/2022, no  significant changes noted.  --------------------------------------------------------------    < end of copied text >  
BILLY RUSSELL 72y Male      Patient is a 72y old  Male who presents with a chief complaint of anemia (07 Jan 2023 16:18)        INTERVAL HPI/OVERNIGHT EVENTS: No acute events overnight. Patient was seen and evaluated at the bedside. The patient denies pain. Vitals stable. Patient denies fever/chills, chest pain, shortness of breath, abdominal pain, headaches, nausea/vomiting, and diarrhea/constipation.      PHYSICAL EXAM:  GENERAL: NAD  HEAD:  Normocephalic  EYES:  conjunctiva and sclera clear  ENMT: Moist mucous membranes  NECK: Supple  NERVOUS SYSTEM:  Alert, awake  CHEST/LUNG: Good air exchange bilaterally, no wheeze  HEART: Regular rate and rhythm        Vital Signs Last 24 Hrs  T(C): 36.7 (09 Jan 2023 06:45), Max: 36.7 (08 Jan 2023 21:57)  T(F): 98 (09 Jan 2023 06:45), Max: 98 (08 Jan 2023 21:57)  HR: 91 (09 Jan 2023 06:45) (83 - 101)  BP: 118/82 (09 Jan 2023 06:45) (118/77 - 132/82)  BP(mean): --  RR: 18 (09 Jan 2023 06:45) (17 - 18)  SpO2: 97% (09 Jan 2023 06:45) (96% - 100%)    Parameters below as of 09 Jan 2023 06:45  Patient On (Oxygen Delivery Method): room air          MEDICATIONS  (STANDING):  atorvastatin 10 milliGRAM(s) Oral at bedtime  dexAMETHasone     Tablet 40 milliGRAM(s) Oral daily  folic acid 1 milliGRAM(s) Oral daily  losartan 100 milliGRAM(s) Oral daily  metoprolol succinate  milliGRAM(s) Oral daily  pantoprazole    Tablet 40 milliGRAM(s) Oral before breakfast  tacrolimus 5 milliGRAM(s) Oral <User Schedule>    MEDICATIONS  (PRN):  polyethylene glycol 3350 17 Gram(s) Oral daily PRN Constipation      Consultant(s) Notes Reviewed:  [X] YES  [ ] NO  Care Discussed with Other Providers [X] YES  [ ] NO  Imaging Personally Reviewed:  [X] YES  [ ] NO      LABS:                        6.8    16.75 )-----------( 227      ( 09 Jan 2023 07:19 )             18.7     01-09    139  |  111<H>  |  54<H>  ----------------------------<  101<H>  4.7   |  19<L>  |  1.66<H>    Ca    7.8<L>      09 Jan 2023 07:23    TPro  x   /  Alb  x   /  TBili  4.4<H>  /  DBili  0.5<H>  /  AST  x   /  ALT  x   /  AlkPhos  x   01-08                  
INTERVAL HPI/OVERNIGHT EVENTS:  Patient S&E at bedside. No o/n events, patient resting comfortably.   Hb low, plan for warm matched transfusion today  Asymptomatic otherwise    VITAL SIGNS:  T(F): 98 (01-07-23 @ 14:01)  HR: 90 (01-07-23 @ 14:01)  BP: 100/65 (01-07-23 @ 14:01)  RR: 18 (01-07-23 @ 14:01)  SpO2: 97% (01-07-23 @ 14:01)  Wt(kg): --    PHYSICAL EXAM:    Constitutional: NAD  Eyes: EOMI, sclera non-icteric  Neck: supple, no masses, no JVD  Respiratory: CTA b/l, good air entry b/l  Cardiovascular: RRR, no M/R/G  Gastrointestinal: soft, NTND,   Extremities: no c/c/e  Neurological: AAOx3      MEDICATIONS  (STANDING):  acetaminophen     Tablet .. 650 milliGRAM(s) Oral once  atorvastatin 10 milliGRAM(s) Oral at bedtime  dexAMETHasone     Tablet 40 milliGRAM(s) Oral daily  diphenhydrAMINE 25 milliGRAM(s) Oral once  folic acid 1 milliGRAM(s) Oral daily  immune   globulin 10% (GAMMAGARD) IVPB 70 Gram(s) IV Intermittent once  losartan 100 milliGRAM(s) Oral daily  metoprolol succinate  milliGRAM(s) Oral daily  pantoprazole    Tablet 40 milliGRAM(s) Oral before breakfast  tacrolimus 4 milliGRAM(s) Oral <User Schedule>    MEDICATIONS  (PRN):  polyethylene glycol 3350 17 Gram(s) Oral daily PRN Constipation      Allergies    No Known Allergies    Intolerances        LABS:                        5.4    16.72 )-----------( 222      ( 07 Jan 2023 07:19 )             16.0     01-07    137  |  104  |  65<H>  ----------------------------<  98  4.9   |  19<L>  |  1.99<H>    Ca    8.2<L>      07 Jan 2023 07:17    TPro  x   /  Alb  x   /  TBili  4.8<H>  /  DBili  0.5<H>  /  AST  x   /  ALT  x   /  AlkPhos  x   01-07          RADIOLOGY & ADDITIONAL TESTS:  Studies reviewed.    ASSESSMENT & PLAN: 
YVONNE BILLY 72y Male      Patient is a 72y old  Male who presents with a chief complaint of       INTERVAL HPI/OVERNIGHT EVENTS: No acute events overnight. Patient was seen and evaluated at the bedside. The patient denies pain. Vitals stable. Patient denies fever/chills, chest pain, shortness of breath, abdominal pain, headaches, nausea/vomiting, and diarrhea/constipation.      PHYSICAL EXAM:  GENERAL: NAD  HEAD:  Normocephalic  EYES:  conjunctiva and sclera clear  ENMT: Moist mucous membranes  NECK: Supple  NERVOUS SYSTEM:  Alert, awake  CHEST/LUNG: Good air exchange bilaterally, no wheeze  HEART: Regular rate and rhythm        Vital Signs Last 24 Hrs  T(C): 36.7 (2023 06:08), Max: 37 (2023 22:11)  T(F): 98.1 (2023 06:08), Max: 98.6 (2023 22:11)  HR: 102 (2023 06:08) (102 - 117)  BP: 110/73 (2023 06:08) (110/73 - 131/81)  BP(mean): --  RR: 18 (2023 06:08) (17 - 18)  SpO2: 98% (2023 06:08) (96% - 99%)    Parameters below as of 2023 06:08  Patient On (Oxygen Delivery Method): room air          MEDICATIONS  (STANDING):  atorvastatin 10 milliGRAM(s) Oral at bedtime  dexAMETHasone     Tablet 40 milliGRAM(s) Oral daily  folic acid 1 milliGRAM(s) Oral daily  losartan 100 milliGRAM(s) Oral daily  metoprolol succinate  milliGRAM(s) Oral daily  pantoprazole    Tablet 40 milliGRAM(s) Oral before breakfast  tacrolimus 3 milliGRAM(s) Oral <User Schedule>    MEDICATIONS  (PRN):  polyethylene glycol 3350 17 Gram(s) Oral daily PRN Constipation      Consultant(s) Notes Reviewed:  [X] YES  [ ] NO  Care Discussed with Other Providers [X] YES  [ ] NO  Imaging Personally Reviewed:  [X] YES  [ ] NO      LABS:                        6.0    16.46 )-----------( 197      ( 2023 10:44 )             18.2     -    141  |  106  |  53<H>  ----------------------------<  102<H>  4.6   |  22  |  1.88<H>    Ca    8.8      2023 07:21    TPro  6.8  /  Alb  4.1  /  TBili  5.5<H>  /  DBili  0.5<H>  /  AST  28  /  ALT  12  /  AlkPhos  53  01-06    PT/INR - ( 2023 14:43 )   PT: 12.7 sec;   INR: 1.10 ratio         PTT - ( 2023 14:43 )  PTT:18.2 sec    Urinalysis Basic - ( 2023 16:43 )    Color: Yellow / Appearance: Clear / S.021 / pH: x  Gluc: x / Ketone: Negative  / Bili: Negative / Urobili: Negative   Blood: x / Protein: 30 mg/dL / Nitrite: Negative   Leuk Esterase: Negative / RBC: 1 /hpf / WBC 1 /HPF   Sq Epi: x / Non Sq Epi: 0 /hpf / Bacteria: Negative              
YVONNE BILLY 72y Male      Patient is a 72y old  Male who presents with a chief complaint of Anemia (09 Jan 2023 15:55)        INTERVAL HPI/OVERNIGHT EVENTS: No acute events overnight. Patient was seen and evaluated at the bedside. The patient denies pain. Vitals stable. Patient denies fever/chills, chest pain, shortness of breath, abdominal pain, headaches, nausea/vomiting, and diarrhea/constipation.      PHYSICAL EXAM:  GENERAL: NAD  HEAD:  Normocephalic  EYES:  conjunctiva and sclera clear  ENMT: Moist mucous membranes  NECK: Supple  NERVOUS SYSTEM:  Alert, awake  CHEST/LUNG: Good air exchange bilaterally, no wheeze  HEART: Regular rate and rhythm        Vital Signs Last 24 Hrs  T(C): 36.7 (12 Jan 2023 06:08), Max: 36.7 (11 Jan 2023 13:28)  T(F): 98.1 (12 Jan 2023 06:08), Max: 98.1 (12 Jan 2023 06:08)  HR: 96 (12 Jan 2023 06:08) (67 - 96)  BP: 127/74 (12 Jan 2023 06:08) (111/70 - 145/76)  BP(mean): --  RR: 18 (12 Jan 2023 06:08) (17 - 18)  SpO2: 96% (12 Jan 2023 06:08) (96% - 97%)    Parameters below as of 12 Jan 2023 06:08  Patient On (Oxygen Delivery Method): room air          MEDICATIONS  (STANDING):  atorvastatin 10 milliGRAM(s) Oral at bedtime  folic acid 1 milliGRAM(s) Oral daily  losartan 100 milliGRAM(s) Oral daily  metoprolol succinate  milliGRAM(s) Oral daily  pantoprazole    Tablet 40 milliGRAM(s) Oral before breakfast  predniSONE   Tablet 75 milliGRAM(s) Oral daily  tacrolimus 5 milliGRAM(s) Oral <User Schedule>    MEDICATIONS  (PRN):  polyethylene glycol 3350 17 Gram(s) Oral daily PRN Constipation      Consultant(s) Notes Reviewed:  [X] YES  [ ] NO  Care Discussed with Other Providers [X] YES  [ ] NO  Imaging Personally Reviewed:  [X] YES  [ ] NO      LABS:                        7.4    19.69 )-----------( 191      ( 12 Jan 2023 07:17 )             21.2     01-12    140  |  109<H>  |  47<H>  ----------------------------<  100<H>  4.6   |  23  |  1.48<H>    Ca    8.1<L>      12 Jan 2023 07:17    TPro  x   /  Alb  x   /  TBili  4.3<H>  /  DBili  0.5<H>  /  AST  x   /  ALT  x   /  AlkPhos  x   01-12                  
Subjective:  No overnight events    Medications:  atorvastatin 10 milliGRAM(s) Oral at bedtime  folic acid 1 milliGRAM(s) Oral daily  losartan 100 milliGRAM(s) Oral daily  metoprolol succinate  milliGRAM(s) Oral daily  pantoprazole    Tablet 40 milliGRAM(s) Oral before breakfast  polyethylene glycol 3350 17 Gram(s) Oral daily PRN  predniSONE   Tablet 75 milliGRAM(s) Oral daily  tacrolimus 5 milliGRAM(s) Oral <User Schedule>    Vitals:  T(C): 36.4 (01-09-23 @ 12:47), Max: 36.7 (01-08-23 @ 21:57)  HR: 94 (01-09-23 @ 12:47) (89 - 101)  BP: 117/77 (01-09-23 @ 12:47) (117/77 - 130/81)  BP(mean): --  RR: 18 (01-09-23 @ 12:47) (17 - 18)  SpO2: 97% (01-09-23 @ 12:47) (96% - 97%)    Daily     Daily         I&O's Summary    08 Jan 2023 07:01  -  09 Jan 2023 07:00  --------------------------------------------------------  IN: 630 mL / OUT: 1175 mL / NET: -545 mL    09 Jan 2023 07:01  -  09 Jan 2023 15:57  --------------------------------------------------------  IN: 120 mL / OUT: 0 mL / NET: 120 mL        Physical Exam:  Appearance: No Acute Distress  HEENT: JVP non elevated  Cardiovascular: RRR, Normal S1 S2, No murmurs/rubs/gallops  Respiratory: Clear to auscultation bilaterally  Gastrointestinal: Soft, Non-tender, non-distended	  Skin: no skin lesions  Neurologic: Non-focal  Extremities: No LE edema, warm and well perfused  Psychiatry: A & O x 3, Mood & affect appropriate      Labs:                        6.8    16.75 )-----------( 227      ( 09 Jan 2023 07:19 )             18.7     01-09    139  |  111<H>  |  54<H>  ----------------------------<  101<H>  4.7   |  19<L>  |  1.66<H>    Ca    7.8<L>      09 Jan 2023 07:23    TPro  x   /  Alb  x   /  TBili  4.4<H>  /  DBili  0.5<H>  /  AST  x   /  ALT  x   /  AlkPhos  x   01-08                Lactate Dehydrogenase, Serum: 605 U/L (01-09 @ 07:23)  Lactate Dehydrogenase, Serum: 631 U/L (01-08 @ 07:33)  Lactate Dehydrogenase, Serum: 531 U/L (01-07 @ 07:17)

## 2023-01-12 NOTE — DISCHARGE NOTE NURSING/CASE MANAGEMENT/SOCIAL WORK - NSDCFUADDAPPT_GEN_ALL_CORE_FT
spoke to [facility] ([phone]) new medication received and will receive new package dose  today ( 730 pm), discussed with pt and family

## 2023-01-12 NOTE — DISCHARGE NOTE PROVIDER - NSDCMRMEDTOKEN_GEN_ALL_CORE_FT
folic acid 1 mg oral tablet: 1 tab(s) orally once a day  losartan 100 mg oral tablet: 1 tab(s) orally once a day  metoprolol succinate 200 mg oral tablet, extended release: 1 tab(s) orally once a day  pantoprazole 40 mg oral delayed release tablet: 1 tab(s) orally once a day  polyethylene glycol 3350 oral powder for reconstitution: 17 gram(s) orally once a day, As needed, Constipation  pravastatin 20 mg oral tablet: 1 tab(s) orally once a day  predniSONE 50 mg oral tablet: 1.5 tab(s) orally once a day   tacrolimus 5 mg oral capsule: 1 cap(s) orally 2 times a day

## 2023-01-12 NOTE — PROGRESS NOTE ADULT - PROVIDER SPECIALTY LIST ADULT
Heme/Onc
Internal Medicine
Internal Medicine
Heme/Onc
Internal Medicine
Transplant Cardiology

## 2023-01-12 NOTE — DISCHARGE NOTE NURSING/CASE MANAGEMENT/SOCIAL WORK - NSDCVIVACCINE_GEN_ALL_CORE_FT
Hep A, adult; 07-Feb-2018 09:02; Kathleen Reyes (RN); [employer]; 7439F; IntraMuscular; Deltoid Right.; 1 milliLiter(s); VIS (VIS Published: 20-Jul-2017, VIS Presented: 07-Feb-2018);   Hep B, adult; 07-Feb-2018 09:09; Kathleen Reyes (RN); [employer]; B39CM; IntraMuscular; Deltoid Left.; 1 milliLiter(s); VIS (VIS Published: 20-Jul-2016, VIS Presented: 07-Feb-2018);   Hep B, adult; 16-Feb-2018 19:00; Brooklynn Coles (DIXON); [employer]; B39CM; IntraMuscular; Deltoid Right.; 1 milliLiter(s); VIS (VIS Published: 20-Jul-2016, VIS Presented: 16-Feb-2018);   influenza, injectable, quadrivalent, preservative free; 02-Oct-2020 12:07; Enriqueta Hassan ([place]); [employer]; [id number] (Exp. Date: 30-Jun-2021); IntraMuscular; Deltoid Right.; 0.5 milliLiter(s); VIS (VIS Published: 15-Aug-2019, VIS Presented: 02-Oct-2020);

## 2023-01-12 NOTE — DISCHARGE NOTE PROVIDER - PROVIDER TOKENS
PROVIDER:[TOKEN:[[id number]],FOLLOWUP:[2 weeks]],PROVIDER:[TOKEN:[[id number]],FOLLOWUP:[1 week]]

## 2023-01-12 NOTE — DISCHARGE NOTE PROVIDER - CARE PROVIDERS DIRECT ADDRESSES
[email or web address]

## 2023-01-12 NOTE — PROGRESS NOTE ADULT - ASSESSMENT
70 male h/o hemolytic anemia, NICM s/p heart transplnt, hep c, here with anemia    hemolytic anemia  heme consulted  s/p 40mg PO dexamethasone x4 days.   s/p ivg  will consider Rituxan  - Hemolysis labs daily  hold transfusion unless symptomatic.  s/p 1 unit prbc  now on prednisone as per heme.  cont to monitor     s/p heart transplant  cont transplant meds  cont home cardiac meds  transplant cardiology f/u  repeat echo unchanged    ckd  stable  renal following    dc planning if no further intervention planned by heme with close outpt f/u, if ok with heme       Advanced care planning was discussed with patient and family.  Advanced care planning forms were reviewed and discussed as appropriate.  Differential diagnosis and plan of care discussed with patient after the evaluation.   Pain assessed and judicious use of narcotics when appropriate was discussed.  Importance of Fall prevention discussed.  Counseling on Smoking and Alcohol cessation was offered when appropriate.  Counseling on Diet, exercise, and medication compliance was done.   Approx 30 minutes spent.

## 2023-01-12 NOTE — PROGRESS NOTE ADULT - ATTENDING COMMENTS
Yoseph Baez is a patient seen in outpatient office by Dr Rosario. He has a history of cardiac transplantation and immune suppression therapy. 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 He is stable for discharge and Follow up will be arranged at [facility] and appointment will be scheduled with Dr Rosario
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 There is no evidence of bleeding. He received PLEX, IVIG, RTXN in the past and currently has been on maintenance prednisone at 5 mg daily.  In this admission we started him on pulse dose dexamethasone. He has not shown appreciable response yet; Hgb has dropped further. Started him on IVIG. Yet no appreciable response noted. Received a unit of transfusion. Will restart high dose steroid. Monitor hemolysis labs, send a cryoglobulin/cold agglutinin titer, avoid transfusion as far as possible. All fluid should be administered at warm temperature.  Please supplement folate. Will follow up closely.
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 There is no evidence of bleeding. He received PLEX, IVIG, RTXN in the past and currently has been on maintenance prednisone at 5 mg daily.  In this admission we started him on pulse dose dexamethasone. He has not shown appreciable response yet; Hgb has dropped further. Will start him on IVIG today. Monitor hemolysis labs, send a cryoglobulin/cold agglutinin titer, avoid transfusion as far as possible. All fluid should be administered at warm temperature.  Please supplement folate. Will follow up closely.

## 2023-01-12 NOTE — DISCHARGE NOTE NURSING/CASE MANAGEMENT/SOCIAL WORK - PATIENT PORTAL LINK FT
You can access the FollowMyHealth Patient Portal offered by [facility] by registering at the following website: [email or web address]. By joining [facility]’s FollowMyHealth portal, you will also be able to view your health information using other applications (apps) compatible with our system.

## 2023-01-12 NOTE — PROGRESS NOTE ADULT - ASSESSMENT
BILLY RUSSELL is a 72y Male with a past medical history as described above who presented for evaluation of low hemoglobin on CBC. Being treated with steroids by Dr. Garcia at [facility] for mixed autoimmune hemolytic anemia. Hematology consulted due to concern for AIHA flare.     # Mixed Autoimmune Hemolytic Anemia  - Avoid blood transfusion unless he is significantly symptomatic due to risk of hyperhemolysis. If he does need blood, please make sure it is appropriately cross matched and warmed- all fluids should be warmed. Any IV medication including fluids should be warmed before infusing given his history of cold agglutination   -  S/P pulse steroids x4 days and IVIG 1g/kg x 2 days   - Cryoglobulin neg  - Continue 1mg/kg prednisone daily per Dr. Rosario = 75mg daily and long taper      - Folic acid 1mg PO daily   - Can be Dc with close hematological follow-up     Fabian Moreau MD, PGY-4  Hematology/Medical Oncology Fellow  Pager: [phone]  Available on [employer] Teams  After 5pm or on weekends please contact  to page on-call fellow

## 2023-01-13 ENCOUNTER — APPOINTMENT (OUTPATIENT)
Dept: ORTHOPEDIC SURGERY | Facility: CLINIC | Age: 73
End: 2023-01-13

## 2023-01-20 LAB
ALBUMIN SERPL ELPH-MCNC: 3.9 G/DL — SIGNIFICANT CHANGE UP (ref 3.3–5)
ALP SERPL-CCNC: 63 U/L — SIGNIFICANT CHANGE UP (ref 40–120)
ALT FLD-CCNC: 17 U/L — SIGNIFICANT CHANGE UP (ref 10–45)
ANION GAP SERPL CALC-SCNC: 7 MMOL/L — SIGNIFICANT CHANGE UP (ref 5–17)
AST SERPL-CCNC: 24 U/L — SIGNIFICANT CHANGE UP (ref 10–40)
BASOPHILS # BLD AUTO: 0.01 K/UL — SIGNIFICANT CHANGE UP (ref 0–0.2)
BASOPHILS NFR BLD AUTO: 0.1 % — SIGNIFICANT CHANGE UP (ref 0–2)
BILIRUB SERPL-MCNC: 2.4 MG/DL — HIGH (ref 0.2–1.2)
BUN SERPL-MCNC: 36 MG/DL — HIGH (ref 7–23)
CALCIUM SERPL-MCNC: 8.9 MG/DL — SIGNIFICANT CHANGE UP (ref 8.4–10.5)
CHLORIDE SERPL-SCNC: 109 MMOL/L — HIGH (ref 96–108)
CO2 SERPL-SCNC: 23 MMOL/L — SIGNIFICANT CHANGE UP (ref 22–31)
CREAT SERPL-MCNC: 1.36 MG/DL — HIGH (ref 0.5–1.3)
EGFR: 55 ML/MIN/1.73M2 — LOW
EOSINOPHIL # BLD AUTO: 0.03 K/UL — SIGNIFICANT CHANGE UP (ref 0–0.5)
EOSINOPHIL NFR BLD AUTO: 0.2 % — SIGNIFICANT CHANGE UP (ref 0–6)
GLUCOSE SERPL-MCNC: 88 MG/DL — SIGNIFICANT CHANGE UP (ref 70–99)
HCT VFR BLD CALC: 24.8 % — LOW (ref 39–50)
HGB BLD-MCNC: 8 G/DL — LOW (ref 13–17)
IMM GRANULOCYTES NFR BLD AUTO: 1 % — HIGH (ref 0–0.9)
LYMPHOCYTES # BLD AUTO: 1.81 K/UL — SIGNIFICANT CHANGE UP (ref 1–3.3)
LYMPHOCYTES # BLD AUTO: 12.6 % — LOW (ref 13–44)
MAGNESIUM SERPL-MCNC: 2.1 MG/DL — SIGNIFICANT CHANGE UP (ref 1.6–2.6)
MCHC RBC-ENTMCNC: 32.3 GM/DL — SIGNIFICANT CHANGE UP (ref 32–36)
MCHC RBC-ENTMCNC: 36.9 PG — HIGH (ref 27–34)
MCV RBC AUTO: 114.3 FL — HIGH (ref 80–100)
MONOCYTES # BLD AUTO: 0.93 K/UL — HIGH (ref 0–0.9)
MONOCYTES NFR BLD AUTO: 6.4 % — SIGNIFICANT CHANGE UP (ref 2–14)
NEUTROPHILS # BLD AUTO: 11.5 K/UL — HIGH (ref 1.8–7.4)
NEUTROPHILS NFR BLD AUTO: 79.7 % — HIGH (ref 43–77)
NRBC # BLD: 1 /100 WBCS — HIGH (ref 0–0)
PLATELET # BLD AUTO: 268 K/UL — SIGNIFICANT CHANGE UP (ref 150–400)
POTASSIUM SERPL-MCNC: 5.8 MMOL/L — HIGH (ref 3.5–5.3)
POTASSIUM SERPL-SCNC: 5.8 MMOL/L — HIGH (ref 3.5–5.3)
PROT SERPL-MCNC: 7 G/DL — SIGNIFICANT CHANGE UP (ref 6–8.3)
RBC # BLD: 2.17 M/UL — LOW (ref 4.2–5.8)
RBC # FLD: 26.9 % — HIGH (ref 10.3–14.5)
SODIUM SERPL-SCNC: 139 MMOL/L — SIGNIFICANT CHANGE UP (ref 135–145)
TACROLIMUS SERPL-MCNC: 9 NG/ML — SIGNIFICANT CHANGE UP
WBC # BLD: 14.42 K/UL — HIGH (ref 3.8–10.5)
WBC # FLD AUTO: 14.42 K/UL — HIGH (ref 3.8–10.5)

## 2023-01-20 PROCEDURE — 80053 COMPREHEN METABOLIC PANEL: CPT

## 2023-01-20 PROCEDURE — 82595 ASSAY OF CRYOGLOBULIN: CPT

## 2023-01-20 PROCEDURE — 83010 ASSAY OF HAPTOGLOBIN QUANT: CPT

## 2023-01-20 PROCEDURE — 36430 TRANSFUSION BLD/BLD COMPNT: CPT

## 2023-01-20 PROCEDURE — 83615 LACTATE (LD) (LDH) ENZYME: CPT

## 2023-01-20 PROCEDURE — 86880 COOMBS TEST DIRECT: CPT

## 2023-01-20 PROCEDURE — 85027 COMPLETE CBC AUTOMATED: CPT

## 2023-01-20 PROCEDURE — 85610 PROTHROMBIN TIME: CPT

## 2023-01-20 PROCEDURE — 83735 ASSAY OF MAGNESIUM: CPT

## 2023-01-20 PROCEDURE — 85025 COMPLETE CBC W/AUTO DIFF WBC: CPT

## 2023-01-20 PROCEDURE — 81001 URINALYSIS AUTO W/SCOPE: CPT

## 2023-01-20 PROCEDURE — 36415 COLL VENOUS BLD VENIPUNCTURE: CPT

## 2023-01-20 PROCEDURE — 86850 RBC ANTIBODY SCREEN: CPT

## 2023-01-20 PROCEDURE — P9040: CPT

## 2023-01-20 PROCEDURE — 87637 SARSCOV2&INF A&B&RSV AMP PRB: CPT

## 2023-01-20 PROCEDURE — 93306 TTE W/DOPPLER COMPLETE: CPT

## 2023-01-20 PROCEDURE — 80048 BASIC METABOLIC PNL TOTAL CA: CPT

## 2023-01-20 PROCEDURE — 86922 COMPATIBILITY TEST ANTIGLOB: CPT

## 2023-01-20 PROCEDURE — 86902 BLOOD TYPE ANTIGEN DONOR EA: CPT

## 2023-01-20 PROCEDURE — 86901 BLOOD TYPING SEROLOGIC RH(D): CPT

## 2023-01-20 PROCEDURE — 97162 PT EVAL MOD COMPLEX 30 MIN: CPT

## 2023-01-20 PROCEDURE — 80197 ASSAY OF TACROLIMUS: CPT

## 2023-01-20 PROCEDURE — 86900 BLOOD TYPING SEROLOGIC ABO: CPT

## 2023-01-20 PROCEDURE — 82272 OCCULT BLD FECES 1-3 TESTS: CPT

## 2023-01-20 PROCEDURE — 80076 HEPATIC FUNCTION PANEL: CPT

## 2023-01-20 PROCEDURE — 82248 BILIRUBIN DIRECT: CPT

## 2023-01-20 PROCEDURE — 86870 RBC ANTIBODY IDENTIFICATION: CPT

## 2023-01-20 PROCEDURE — 85045 AUTOMATED RETICULOCYTE COUNT: CPT

## 2023-01-20 PROCEDURE — 86157 COLD AGGLUTININ TITER: CPT

## 2023-01-20 PROCEDURE — 99285 EMERGENCY DEPT VISIT HI MDM: CPT | Mod: 25

## 2023-01-20 PROCEDURE — 82247 BILIRUBIN TOTAL: CPT

## 2023-01-20 PROCEDURE — 93356 MYOCRD STRAIN IMG SPCKL TRCK: CPT

## 2023-01-20 PROCEDURE — 85730 THROMBOPLASTIN TIME PARTIAL: CPT

## 2023-01-20 PROCEDURE — 71045 X-RAY EXAM CHEST 1 VIEW: CPT

## 2023-01-21 NOTE — PROGRESS NOTE ADULT - PROBLEM SELECTOR PLAN 6
0705: patient complaining of chest pain and O2 sats 70-80%, placed on NC - night nurse, Jeanette Judd, messaged day hospitalist, Dr. Minor.   0707: placed on oxymask 15L, still sats in 80-85%  0711: orders received from MD: stat EKG, CXR, troponin   0712: call respiratory for a nonrebreather   0715: , /80, , O2 70%  0717: call RRT  0718: spoke with dr minor on the phone, still desatting on nonrebreather, notified of called RRT   0720: EKG showed anterior infarct  0721: morphine 4mg iv given   0725: MD notified of EKG results, labs drawn   0729: CXR done   0732: repeat vitals - /88, , O2 82%  0733: ABGs resulted, md reviewed results in person   0755: placed on vapotherm, pt sat 100%  0815: troponin results given to MD, within normal levels    - Continue atovaquone for PCP and toxo prophylaxis.   - Resumed valganciclovir 450 mg BID for CMV prophylaxis.

## 2023-01-24 RX ORDER — DENOSUMAB 60 MG/ML
60 INJECTION SUBCUTANEOUS
Qty: 1 | Refills: 1 | Status: ACTIVE | COMMUNITY
Start: 2022-07-19

## 2023-01-25 ENCOUNTER — RESULT REVIEW (OUTPATIENT)
Age: 73
End: 2023-01-25

## 2023-01-25 ENCOUNTER — APPOINTMENT (OUTPATIENT)
Dept: HEMATOLOGY ONCOLOGY | Facility: CLINIC | Age: 73
End: 2023-01-25
Payer: MEDICARE

## 2023-01-25 VITALS
OXYGEN SATURATION: 97 % | RESPIRATION RATE: 16 BRPM | TEMPERATURE: 97.7 F | SYSTOLIC BLOOD PRESSURE: 130 MMHG | DIASTOLIC BLOOD PRESSURE: 87 MMHG | WEIGHT: 179.67 LBS | BODY MASS INDEX: 29.9 KG/M2 | HEART RATE: 99 BPM

## 2023-01-25 LAB
BASOPHILS # BLD AUTO: 0.01 K/UL — SIGNIFICANT CHANGE UP (ref 0–0.2)
BASOPHILS NFR BLD AUTO: 0.1 % — SIGNIFICANT CHANGE UP (ref 0–2)
EOSINOPHIL # BLD AUTO: 0 K/UL — SIGNIFICANT CHANGE UP (ref 0–0.5)
EOSINOPHIL NFR BLD AUTO: 0 % — SIGNIFICANT CHANGE UP (ref 0–6)
HCT VFR BLD CALC: 29.1 % — LOW (ref 39–50)
HGB BLD-MCNC: 9.3 G/DL — LOW (ref 13–17)
IMM GRANULOCYTES NFR BLD AUTO: 0.7 % — SIGNIFICANT CHANGE UP (ref 0–0.9)
LYMPHOCYTES # BLD AUTO: 0.47 K/UL — LOW (ref 1–3.3)
LYMPHOCYTES # BLD AUTO: 5 % — LOW (ref 13–44)
MCHC RBC-ENTMCNC: 31.9 G/DL — LOW (ref 32–36)
MCHC RBC-ENTMCNC: 35.1 PG — HIGH (ref 27–34)
MCV RBC AUTO: 110.2 FL — HIGH (ref 80–100)
MONOCYTES # BLD AUTO: 0.23 K/UL — SIGNIFICANT CHANGE UP (ref 0–0.9)
MONOCYTES NFR BLD AUTO: 2.4 % — SIGNIFICANT CHANGE UP (ref 2–14)
NEUTROPHILS # BLD AUTO: 8.62 K/UL — HIGH (ref 1.8–7.4)
NEUTROPHILS NFR BLD AUTO: 91.8 % — HIGH (ref 43–77)
NRBC # BLD: 0 /100 WBCS — SIGNIFICANT CHANGE UP (ref 0–0)
PLATELET # BLD AUTO: 228 K/UL — SIGNIFICANT CHANGE UP (ref 150–400)
RBC # BLD: 2.62 M/UL — LOW (ref 4.2–5.8)
RBC # FLD: 21.4 % — HIGH (ref 10.3–14.5)
RETICS #: 296 K/UL — HIGH (ref 25–125)
RETICS/RBC NFR: 11.2 % — HIGH (ref 0.5–2.5)
WBC # BLD: 9.4 K/UL — SIGNIFICANT CHANGE UP (ref 3.8–10.5)
WBC # FLD AUTO: 9.4 K/UL — SIGNIFICANT CHANGE UP (ref 3.8–10.5)

## 2023-01-25 PROCEDURE — 99214 OFFICE O/P EST MOD 30 MIN: CPT

## 2023-01-25 RX ORDER — ASPIRIN 81 MG/1
81 TABLET ORAL DAILY
Qty: 90 | Refills: 3 | Status: DISCONTINUED | COMMUNITY
Start: 2020-10-01 | End: 2023-01-25

## 2023-01-25 RX ORDER — PREDNISONE 1 MG/1
1 TABLET ORAL DAILY
Qty: 150 | Refills: 5 | Status: DISCONTINUED | COMMUNITY
Start: 2022-08-11 | End: 2023-01-25

## 2023-01-25 NOTE — HISTORY OF PRESENT ILLNESS
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 He was treated with Pulse Decadron and IVGG. He was transfused 1 unit of packed red blood cells. and then prednisone was started. He was discharged on 1/7/23. Feels well.  No c/o. No chest pain, SOB, abdominal pain, jaundice, dark urine, melena, BRBPR, fever, night sweats, swollen glands, arthritis, rash, limb pain/swelling. Weight stable.

## 2023-01-25 NOTE — ADDENDUM
[FreeTextEntry1] : I, Darwin Diop, acted solely as a scribe for Dr. Tao Rosario on 01/25/2023. All medical entries made by the Scribe were at my, Dr. Tao Rosario's, direction and personally dictated by me on 01/25/2023. I have reviewed the chart and agree that the record accurately reflects my personal performance of the history, physical exam, assessment and plan. I have also personally directed, reviewed, and agreed with the chart.

## 2023-01-25 NOTE — ASSESSMENT
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 Was in remission with well compensated hemolytic anemia on low dose prednisone, but relapsed after tapered to 3 mg daily. 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 Responded very well to minimal dose increase to 5 mg daily with rising hgb and platelet count, but then relapsed again. Appears to be responding to prednisone 75 mg daily.\par \par Plan:\par Prednisone 75 mg daily \par Discuss PCP prophylaxis with Cardiology\par CMP, LDH\par Tacrolimus/ ASA per Cardiology. ASA held since hospitalized.\par Folic acid\par To ER prn \par RTC in 1 week\par  Pt stated she was " shaking a lot and felt nauseas and was having trouble breathing at 9pm" Pt denies LOC, Pt stated this happened after having 2 ice-cream bars and candy. Pt stated the symptoms resolved when she got into the ambulance. Pt denies any symptoms now, stated she feels " back to herself". Chris Balbuena at bedside to evaluate. Family at bedside.

## 2023-01-25 NOTE — PHYSICAL EXAM
[Restricted in physically strenuous activity but ambulatory and able to carry out work of a light or sedentary nature] : Status 1- Restricted in physically strenuous activity but ambulatory and able to carry out work of a light or sedentary nature, e.g., light house work, office work [Normal] : affect appropriate [de-identified] : RRR. S1S2 normal. Gr 2/6 holosystolic and holodiastolic murmur LLSB. No gallops.

## 2023-01-25 NOTE — CONSULT LETTER
[Dear  ___] : Dear  [unfilled], [Courtesy Letter:] : I had the pleasure of seeing your patient, [unfilled], in my office today. [Please see my note below.] : Please see my note below. [Sincerely,] : Sincerely, [DrEduar  ___] : Dr. HAN [FreeTextEntry2] : Dr. Lisa Ac [FreeTextEntry3] : Tao Rosario M.D., FACP\par Professor of Medicine\par [employer]\par Associate Chief, Division of Hematology\par [facility]\par [facility]\par [address] [place] [address]\[place] [address]\par [phone]\par \par \par \par

## 2023-01-25 NOTE — RESULTS/DATA
[FreeTextEntry1] : WBC 9400 Hgb 9.3 Hct 29.1 .2 Platelets 228,000 Diff 92P, 5L, 2M, 1 Imm, ANC 8620 Retics 11.2%, RPI 3.2%\par \par \par \par

## 2023-01-26 ENCOUNTER — APPOINTMENT (OUTPATIENT)
Dept: HEMATOLOGY ONCOLOGY | Facility: CLINIC | Age: 73
End: 2023-01-26

## 2023-01-26 ENCOUNTER — RESULT REVIEW (OUTPATIENT)
Age: 73
End: 2023-01-26

## 2023-01-26 LAB
ALBUMIN SERPL ELPH-MCNC: 4 G/DL
ALP BLD-CCNC: 64 U/L
ALT SERPL-CCNC: 15 U/L
ANION GAP SERPL CALC-SCNC: 10 MMOL/L
AST SERPL-CCNC: 15 U/L
BASOPHILS # BLD AUTO: 0.01 K/UL — SIGNIFICANT CHANGE UP (ref 0–0.2)
BASOPHILS NFR BLD AUTO: 0.1 % — SIGNIFICANT CHANGE UP (ref 0–2)
BILIRUB SERPL-MCNC: 1.6 MG/DL
BUN SERPL-MCNC: 33 MG/DL — HIGH (ref 7–23)
BUN SERPL-MCNC: 40 MG/DL
CA-I BLDA-SCNC: 1.26 MMOL/L — SIGNIFICANT CHANGE UP (ref 1.12–1.3)
CALCIUM SERPL-MCNC: 9.3 MG/DL
CHLORIDE SERPL-SCNC: 108 MMOL/L
CHLORIDE SERPL-SCNC: 108 MMOL/L — SIGNIFICANT CHANGE UP (ref 96–108)
CO2 SERPL-SCNC: 21 MMOL/L
CO2 SERPL-SCNC: 29 MMOL/L — SIGNIFICANT CHANGE UP (ref 22–31)
CREAT SERPL-MCNC: 1.4 MG/DL — HIGH (ref 0.5–1.3)
CREAT SERPL-MCNC: 1.45 MG/DL
EGFR: 51 ML/MIN/1.73M2
EOSINOPHIL # BLD AUTO: 0 K/UL — SIGNIFICANT CHANGE UP (ref 0–0.5)
EOSINOPHIL NFR BLD AUTO: 0 % — SIGNIFICANT CHANGE UP (ref 0–6)
GLUCOSE SERPL-MCNC: 135 MG/DL — HIGH (ref 70–99)
GLUCOSE SERPL-MCNC: 164 MG/DL
HCT VFR BLD CALC: 28.8 % — LOW (ref 39–50)
HGB BLD-MCNC: 9 G/DL — LOW (ref 13–17)
IMM GRANULOCYTES NFR BLD AUTO: 0.6 % — SIGNIFICANT CHANGE UP (ref 0–0.9)
LDH SERPL-CCNC: 498 U/L
LYMPHOCYTES # BLD AUTO: 0.45 K/UL — LOW (ref 1–3.3)
LYMPHOCYTES # BLD AUTO: 5.5 % — LOW (ref 13–44)
MCHC RBC-ENTMCNC: 31.3 G/DL — LOW (ref 32–36)
MCHC RBC-ENTMCNC: 34 PG — SIGNIFICANT CHANGE UP (ref 27–34)
MCV RBC AUTO: 108.7 FL — HIGH (ref 80–100)
MONOCYTES # BLD AUTO: 0.22 K/UL — SIGNIFICANT CHANGE UP (ref 0–0.9)
MONOCYTES NFR BLD AUTO: 2.7 % — SIGNIFICANT CHANGE UP (ref 2–14)
NEUTROPHILS # BLD AUTO: 7.45 K/UL — HIGH (ref 1.8–7.4)
NEUTROPHILS NFR BLD AUTO: 91.1 % — HIGH (ref 43–77)
NRBC # BLD: 0 /100 WBCS — SIGNIFICANT CHANGE UP (ref 0–0)
PLATELET # BLD AUTO: 217 K/UL — SIGNIFICANT CHANGE UP (ref 150–400)
POTASSIUM SERPL-MCNC: 5.5 MMOL/L — HIGH (ref 3.5–5.3)
POTASSIUM SERPL-SCNC: 5.5 MMOL/L — HIGH (ref 3.5–5.3)
POTASSIUM SERPL-SCNC: 6.1 MMOL/L
PROT SERPL-MCNC: 6.8 G/DL
RBC # BLD: 2.65 M/UL — LOW (ref 4.2–5.8)
RBC # FLD: 20.2 % — HIGH (ref 10.3–14.5)
RETICS #: 245.7 K/UL — HIGH (ref 25–125)
RETICS/RBC NFR: 9.3 % — HIGH (ref 0.5–2.5)
SODIUM SERPL-SCNC: 139 MMOL/L
SODIUM SERPL-SCNC: 139 MMOL/L — SIGNIFICANT CHANGE UP (ref 135–145)
WBC # BLD: 8.18 K/UL — SIGNIFICANT CHANGE UP (ref 3.8–10.5)
WBC # FLD AUTO: 8.18 K/UL — SIGNIFICANT CHANGE UP (ref 3.8–10.5)

## 2023-01-27 LAB
ANION GAP SERPL CALC-SCNC: 10 MMOL/L
BUN SERPL-MCNC: 34 MG/DL
CALCIUM SERPL-MCNC: 8.9 MG/DL
CHLORIDE SERPL-SCNC: 108 MMOL/L
CO2 SERPL-SCNC: 21 MMOL/L
CREAT SERPL-MCNC: 1.29 MG/DL
EGFR: 59 ML/MIN/1.73M2
GLUCOSE SERPL-MCNC: 131 MG/DL
POTASSIUM SERPL-SCNC: 5.9 MMOL/L
SODIUM SERPL-SCNC: 139 MMOL/L

## 2023-01-31 ENCOUNTER — APPOINTMENT (OUTPATIENT)
Dept: CV DIAGNOSITCS | Facility: HOSPITAL | Age: 73
End: 2023-01-31

## 2023-02-02 ENCOUNTER — RESULT REVIEW (OUTPATIENT)
Age: 73
End: 2023-02-02

## 2023-02-02 ENCOUNTER — APPOINTMENT (OUTPATIENT)
Dept: HEMATOLOGY ONCOLOGY | Facility: CLINIC | Age: 73
End: 2023-02-02
Payer: MEDICARE

## 2023-02-02 VITALS
BODY MASS INDEX: 29.97 KG/M2 | OXYGEN SATURATION: 99 % | SYSTOLIC BLOOD PRESSURE: 128 MMHG | RESPIRATION RATE: 16 BRPM | TEMPERATURE: 96.4 F | WEIGHT: 180.12 LBS | DIASTOLIC BLOOD PRESSURE: 64 MMHG | HEART RATE: 90 BPM

## 2023-02-02 LAB
ALBUMIN SERPL ELPH-MCNC: 3.9 G/DL
ALP BLD-CCNC: 61 U/L
ALT SERPL-CCNC: 23 U/L
ANION GAP SERPL CALC-SCNC: 11 MMOL/L
AST SERPL-CCNC: 23 U/L
BASOPHILS # BLD AUTO: 0.01 K/UL — SIGNIFICANT CHANGE UP (ref 0–0.2)
BASOPHILS NFR BLD AUTO: 0.1 % — SIGNIFICANT CHANGE UP (ref 0–2)
BILIRUB SERPL-MCNC: 1.4 MG/DL
BUN SERPL-MCNC: 42 MG/DL
CALCIUM SERPL-MCNC: 9.1 MG/DL
CHLORIDE SERPL-SCNC: 106 MMOL/L
CO2 SERPL-SCNC: 21 MMOL/L
CREAT SERPL-MCNC: 1.63 MG/DL
EGFR: 44 ML/MIN/1.73M2
EOSINOPHIL # BLD AUTO: 0 K/UL — SIGNIFICANT CHANGE UP (ref 0–0.5)
EOSINOPHIL NFR BLD AUTO: 0 % — SIGNIFICANT CHANGE UP (ref 0–6)
GLUCOSE SERPL-MCNC: 142 MG/DL
HCT VFR BLD CALC: 33.6 % — LOW (ref 39–50)
HGB BLD-MCNC: 10.3 G/DL — LOW (ref 13–17)
IMM GRANULOCYTES NFR BLD AUTO: 1.1 % — HIGH (ref 0–0.9)
LDH SERPL-CCNC: 387 U/L
LYMPHOCYTES # BLD AUTO: 0.55 K/UL — LOW (ref 1–3.3)
LYMPHOCYTES # BLD AUTO: 6.2 % — LOW (ref 13–44)
MCHC RBC-ENTMCNC: 30.7 G/DL — LOW (ref 32–36)
MCHC RBC-ENTMCNC: 33 PG — SIGNIFICANT CHANGE UP (ref 27–34)
MCV RBC AUTO: 107.7 FL — HIGH (ref 80–100)
MONOCYTES # BLD AUTO: 0.37 K/UL — SIGNIFICANT CHANGE UP (ref 0–0.9)
MONOCYTES NFR BLD AUTO: 4.2 % — SIGNIFICANT CHANGE UP (ref 2–14)
NEUTROPHILS # BLD AUTO: 7.79 K/UL — HIGH (ref 1.8–7.4)
NEUTROPHILS NFR BLD AUTO: 88.4 % — HIGH (ref 43–77)
NRBC # BLD: 0 /100 WBCS — SIGNIFICANT CHANGE UP (ref 0–0)
PLATELET # BLD AUTO: 194 K/UL — SIGNIFICANT CHANGE UP (ref 150–400)
POTASSIUM SERPL-SCNC: 5.6 MMOL/L
PROT SERPL-MCNC: 6.2 G/DL
RBC # BLD: 3.12 M/UL — LOW (ref 4.2–5.8)
RBC # FLD: 16.5 % — HIGH (ref 10.3–14.5)
SODIUM SERPL-SCNC: 138 MMOL/L
WBC # BLD: 8.82 K/UL — SIGNIFICANT CHANGE UP (ref 3.8–10.5)
WBC # FLD AUTO: 8.82 K/UL — SIGNIFICANT CHANGE UP (ref 3.8–10.5)

## 2023-02-02 PROCEDURE — 99213 OFFICE O/P EST LOW 20 MIN: CPT

## 2023-02-03 LAB
RETICS #: 231 K/UL — HIGH (ref 25–125)
RETICS/RBC NFR: 7.4 % — HIGH (ref 0.5–2.5)

## 2023-02-08 ENCOUNTER — APPOINTMENT (OUTPATIENT)
Dept: ENDOCRINOLOGY | Facility: CLINIC | Age: 73
End: 2023-02-08
Payer: MEDICARE

## 2023-02-08 VITALS
BODY MASS INDEX: 29.32 KG/M2 | WEIGHT: 176 LBS | RESPIRATION RATE: 14 BRPM | HEART RATE: 94 BPM | DIASTOLIC BLOOD PRESSURE: 90 MMHG | OXYGEN SATURATION: 98 % | SYSTOLIC BLOOD PRESSURE: 140 MMHG | HEIGHT: 65 IN

## 2023-02-08 PROCEDURE — 96401 CHEMO ANTI-NEOPL SQ/IM: CPT

## 2023-02-08 PROCEDURE — 99214 OFFICE O/P EST MOD 30 MIN: CPT | Mod: 25

## 2023-02-08 RX ORDER — DENOSUMAB 60 MG/ML
60 INJECTION SUBCUTANEOUS
Qty: 1 | Refills: 0 | Status: COMPLETED | OUTPATIENT
Start: 2023-02-08

## 2023-02-08 RX ADMIN — DENOSUMAB 60 MG/ML: 60 INJECTION SUBCUTANEOUS at 00:00

## 2023-02-08 NOTE — PHYSICAL EXAM
[Restricted in physically strenuous activity but ambulatory and able to carry out work of a light or sedentary nature] : Status 1- Restricted in physically strenuous activity but ambulatory and able to carry out work of a light or sedentary nature, e.g., light house work, office work [Normal] : affect appropriate [de-identified] : RRR. S1S2 normal. Gr 2/6 holosystolic and holodiastolic murmur LLSB. No gallops.

## 2023-02-08 NOTE — ASSESSMENT
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 Was in remission with well compensated hemolytic anemia on low dose prednisone, but relapsed after tapered to 3 mg daily. 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 Responded very well to minimal dose increase to 5 mg daily with rising hgb and platelet count, but then relapsed again. Appears to be responding to prednisone 75 mg daily.\par \par Plan:\par Continue Prednisone 75 mg daily \par Discuss PCP prophylaxis with Cardiology- is taking Bactrim\par CMP, LDH\par Tacrolimus/ ASA per Cardiology.\par Folic acid\par To ER prn \par RTC in 1 week\par

## 2023-02-08 NOTE — HISTORY OF PRESENT ILLNESS
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 He was treated with Pulse Decadron and IVGG. He received 1 unit of packed red blood cells. and then prednisone was started. He was discharged on 1/7/23. Feels well.  No c/o. No chest pain, SOB, abdominal pain, jaundice, dark urine, melena, BRBPR, fever, night sweats, swollen glands, arthritis, rash, limb pain/swelling. Weight stable.

## 2023-02-08 NOTE — CONSULT LETTER
[Dear  ___] : Dear  [unfilled], [Courtesy Letter:] : I had the pleasure of seeing your patient, [unfilled], in my office today. [Please see my note below.] : Please see my note below. [Sincerely,] : Sincerely, [DrEduar  ___] : Dr. HAN [FreeTextEntry2] : Dr. Lisa Ac [FreeTextEntry3] : Tao Rosario M.D., FACP\par Professor of Medicine\par [employer]\par Associate Chief, Division of Hematology\par [facility]\par [facility]\par [address] [place] [address]\[place] [address]\par [phone]\par \par \par \par

## 2023-02-09 ENCOUNTER — APPOINTMENT (OUTPATIENT)
Dept: HEMATOLOGY ONCOLOGY | Facility: CLINIC | Age: 73
End: 2023-02-09
Payer: MEDICARE

## 2023-02-09 ENCOUNTER — RESULT REVIEW (OUTPATIENT)
Age: 73
End: 2023-02-09

## 2023-02-09 VITALS
WEIGHT: 176.37 LBS | OXYGEN SATURATION: 97 % | SYSTOLIC BLOOD PRESSURE: 134 MMHG | TEMPERATURE: 97.7 F | RESPIRATION RATE: 18 BRPM | DIASTOLIC BLOOD PRESSURE: 79 MMHG | HEART RATE: 99 BPM | BODY MASS INDEX: 29.35 KG/M2

## 2023-02-09 LAB
BASOPHILS # BLD AUTO: 0.01 K/UL — SIGNIFICANT CHANGE UP (ref 0–0.2)
BASOPHILS NFR BLD AUTO: 0.1 % — SIGNIFICANT CHANGE UP (ref 0–2)
EOSINOPHIL # BLD AUTO: 0 K/UL — SIGNIFICANT CHANGE UP (ref 0–0.5)
EOSINOPHIL NFR BLD AUTO: 0 % — SIGNIFICANT CHANGE UP (ref 0–6)
HCT VFR BLD CALC: 34.6 % — LOW (ref 39–50)
HGB BLD-MCNC: 10.7 G/DL — LOW (ref 13–17)
IMM GRANULOCYTES NFR BLD AUTO: 2.1 % — HIGH (ref 0–0.9)
LYMPHOCYTES # BLD AUTO: 0.66 K/UL — LOW (ref 1–3.3)
LYMPHOCYTES # BLD AUTO: 6.2 % — LOW (ref 13–44)
MCHC RBC-ENTMCNC: 30.9 G/DL — LOW (ref 32–36)
MCHC RBC-ENTMCNC: 33.3 PG — SIGNIFICANT CHANGE UP (ref 27–34)
MCV RBC AUTO: 107.8 FL — HIGH (ref 80–100)
MONOCYTES # BLD AUTO: 0.16 K/UL — SIGNIFICANT CHANGE UP (ref 0–0.9)
MONOCYTES NFR BLD AUTO: 1.5 % — LOW (ref 2–14)
NEUTROPHILS # BLD AUTO: 9.65 K/UL — HIGH (ref 1.8–7.4)
NEUTROPHILS NFR BLD AUTO: 90.1 % — HIGH (ref 43–77)
NRBC # BLD: 0 /100 WBCS — SIGNIFICANT CHANGE UP (ref 0–0)
PLATELET # BLD AUTO: 167 K/UL — SIGNIFICANT CHANGE UP (ref 150–400)
RBC # BLD: 3.21 M/UL — LOW (ref 4.2–5.8)
RBC # FLD: 15.9 % — HIGH (ref 10.3–14.5)
RETICS #: 181.7 K/UL — HIGH (ref 25–125)
RETICS/RBC NFR: 5.7 % — HIGH (ref 0.5–2.5)
WBC # BLD: 10.7 K/UL — HIGH (ref 3.8–10.5)
WBC # FLD AUTO: 10.7 K/UL — HIGH (ref 3.8–10.5)

## 2023-02-09 PROCEDURE — 99213 OFFICE O/P EST LOW 20 MIN: CPT

## 2023-02-09 NOTE — HISTORY OF PRESENT ILLNESS
[Calcium (dietary)] : dietary Calcium [Vitamin D (oral)] : Vitamin D orally [Taking Steroids] : a history of taking steroids [FreeTextEntry1] : Patient returns for a follow up visit for osteoporosis . Pt reports that he was hospitalized for a low blood count. Pt had a blood transfusion. \par \par Osteoporosis: Pt has low bone density and is being treated with Prolia. Began Prolia July 2022, tolerating well. \par \par Hx of nonischemic cardiomyopathy and heart failure s/p LVAD and heart transplant (2018) on chronic prednisone, and hx autoimmune hemolytic anemia. The patient received a heart transplant in February 23, 2018. The patient had left hip surgery years ago from arthritis. Denies any fracture history, calcium disorders, or kidney stones. No family history of osteoporosis. Patient does take chronic prednisone, currently 4 mg/day. Patient does not drink milk, rarely eats cheese, no yogurt, no fish. Mostly eats chicken, beef. He does takes calcium and vitamin D. He does not regularly visit the dentist, has not seen in a few years.\par \par Patient is currently taking prednisone 4mg/day. BMD May 2019 with femoral neck T score of -2.1, total hip T score of -1.2 and spine T score of -1.1. Repeat BMD 6/2021 demonstrates femoral neck T score of -1.5 (improving osteopenia), total hip T-score of -0.4 (normal), and spine T-score of -0.8 .  The patient elected to hold any osteoporosis therapy and wait for repeat bone density.  No interval fractures.

## 2023-02-09 NOTE — END OF VISIT
[FreeTextEntry3] : This note was written by Annalisa Damian on ( February 8, 2023) acting as a medical scribe for Dr. Clemens.  This note was authored by the medical scribe for me. I have reviewed, edited, and revised the note as needed. I am in agreement with the exam findings, imaging findings, and treatment plan.  Ricardo Clemens MD

## 2023-02-09 NOTE — ASSESSMENT
[Denosumab Therapy] : Risks  and benefits of denosumab therapy were discussed with the patient including eczema, cellulitis, osteonecrosis of the jaw and atypical femur fractures [FreeTextEntry1] : Patient is a 70 yo man with hx of nonischemic cardiomyopathy and heart failure s/p LVAD and heart transplant (2018) on chronic prednisone, and hx autoimmune hemolytic anemia, here for evaluation of osteopenia\par \par Patient is currently taking prednisone 4mg/day. BMD May 2019 with femoral neck T score of -2.1, total hip T score of -1.2 and spine T score of -1.1. Repeat BMD 6/2021 demonstrates femoral neck T score of -1.5 (improving osteopenia), total hip T-score of -0.4 (normal), and spine T-score of -0.8 (normal). \par BMD 2022 shows some decrease in bone density in the femoral neck into the osteoporosis range. Pt began Prolia July 2022, tolerating well. Continue Prolia from [employer]. \par \par \par Follow up in 6 months w BMD

## 2023-02-09 NOTE — PHYSICAL EXAM
[Alert] : alert [Well Nourished] : well nourished [No Acute Distress] : no acute distress [Normal Sclera/Conjunctiva] : normal sclera/conjunctiva [EOMI] : extra ocular movement intact [No Proptosis] : no proptosis [Normal Outer Ear/Nose] : the ears and nose were normal in appearance [No Neck Mass] : no neck mass was observed [No Respiratory Distress] : no respiratory distress [No Accessory Muscle Use] : no accessory muscle use [Normal Rate and Effort] : normal respiratory rate and effort [Clear to Auscultation] : lungs were clear to auscultation bilaterally [Normal S1, S2] : normal S1 and S2 [Normal Rate] : heart rate was normal [Regular Rhythm] : with a regular rhythm [Not Tender] : non-tender [Not Distended] : not distended [Soft] : abdomen soft [No Rash] : no rash [Oriented x3] : oriented to person, place, and time [Normal Affect] : the affect was normal [Normal Insight/Judgement] : insight and judgment were intact [Normal Mood] : the mood was normal [de-identified] : soft sytolic murmur

## 2023-02-10 DIAGNOSIS — Z01.818 ENCOUNTER FOR OTHER PREPROCEDURAL EXAMINATION: ICD-10-CM

## 2023-02-10 LAB
ALBUMIN SERPL ELPH-MCNC: 3.9 G/DL
ALP BLD-CCNC: 66 U/L
ALT SERPL-CCNC: 30 U/L
ANION GAP SERPL CALC-SCNC: 12 MMOL/L
AST SERPL-CCNC: 19 U/L
BILIRUB SERPL-MCNC: 1 MG/DL
BUN SERPL-MCNC: 34 MG/DL
CALCIUM SERPL-MCNC: 9.1 MG/DL
CHLORIDE SERPL-SCNC: 107 MMOL/L
CO2 SERPL-SCNC: 20 MMOL/L
CREAT SERPL-MCNC: 1.42 MG/DL
EGFR: 52 ML/MIN/1.73M2
GLUCOSE SERPL-MCNC: 186 MG/DL
POTASSIUM SERPL-SCNC: 4.8 MMOL/L
PROT SERPL-MCNC: 6.1 G/DL
SODIUM SERPL-SCNC: 139 MMOL/L

## 2023-02-10 NOTE — PROGRESS NOTE ADULT - PROBLEM SELECTOR PROBLEM 3
D: presents for pulmonary hypertension evaluation in the setting of progressive dyspnea. Initiated IV remodulin and oral tadalafil for severe PAH.     PMHx:obesity and hypothyroidism     I: Monitored vitals and assessed pt status. Encouraged activity.      Changed: Pt reported headache worsening and nausea. Vomitted. Reported to team. Got prn dilaudid and antiemetics (both given with results). Symptoms improved. Increased remodulin as instructed by the team. Contnuing to monitor patient closely. Increased 02 needs. Providers aware. If patient still needs 4-5 L NC call night team to discuss whether or not to increase remodulin dose at 0000.   IV Access: PIV x 1; PICC single   Running:  remodulin at 4 ng/kg/min  PRN: dilaudid   Tele: SR  O2: 4L NC  Mobility: Independent   Skin: right neck incision from RHC     A: A&Ox4. VSS. Afebrile. Suffered  From lots of N/V this AM. No vomiting this afternoon. Nausea on off. Headache better with PRNs. Needed increased O2 needs. BM in bathroom and urine. Calls approp     Updated  towards end of shift.      P: Continue to monitor pt status and report changes to treatment team.     Anemia Elevated creatine kinase

## 2023-02-14 NOTE — ASSESSMENT
[Palliative Care Plan] : not applicable at this time [FreeTextEntry1] : 73 YO M S/P cardiac transplant developed mixed type autoimmune hemolytic anemia while on immunosuppression. Was in remission with well compensated hemolytic anemia on low dose prednisone, but relapsed after tapered to 3 mg daily. 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 Responded very well to minimal dose increase to 5 mg daily with rising hgb and platelet count, but then relapsed and was hospitalized with Hgb of 6.5.  Was started on prednisone 75 mg daily and is responding with improvement in counts.. \par \par Plan:\par Continue Prednisone 75 mg daily \par PCP prophylaxis as per Cardiology- is taking Bactrim\par CMP, LDH\par Tacrolimus/ ASA per Cardiology.\par Folic acid\par To ER prn \par RTC in 1 week\par

## 2023-02-14 NOTE — PHYSICAL EXAM
[Restricted in physically strenuous activity but ambulatory and able to carry out work of a light or sedentary nature] : Status 1- Restricted in physically strenuous activity but ambulatory and able to carry out work of a light or sedentary nature, e.g., light house work, office work [Normal] : affect appropriate [de-identified] : RRR. S1S2 normal. Gr 2/6 holosystolic and holodiastolic murmur LLSB. No gallops.

## 2023-02-14 NOTE — HISTORY OF PRESENT ILLNESS
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 He was treated with Pulse Decadron and IVGG. He received 1 unit of packed red blood cells. and then prednisone was started. He was discharged on 1/7/23. Feels well.  No c/o. Denies chest pain, SOB, abdominal pain, jaundice, dark urine, melena, BRBPR, fever, night sweats, swollen glands, arthritis, rash, limb pain/swelling. Weight stable. Is being followed by cardiac transplant team.

## 2023-02-14 NOTE — CONSULT LETTER
[Dear  ___] : Dear  [unfilled], [Courtesy Letter:] : I had the pleasure of seeing your patient, [unfilled], in my office today. [Please see my note below.] : Please see my note below. [Sincerely,] : Sincerely, [DrEduar  ___] : Dr. HAN [FreeTextEntry2] : Dr. Lisa Ac [FreeTextEntry3] : Tao Rosario M.D., FACP\par Professor of Medicine\par [employer]\par Associate Chief, Division of Hematology\par [facility]\par [facility]\par [address] [place] [address]\[place] [address]\par [phone]\par \par \par \par

## 2023-02-16 ENCOUNTER — RESULT REVIEW (OUTPATIENT)
Age: 73
End: 2023-02-16

## 2023-02-16 ENCOUNTER — APPOINTMENT (OUTPATIENT)
Dept: HEMATOLOGY ONCOLOGY | Facility: CLINIC | Age: 73
End: 2023-02-16
Payer: MEDICARE

## 2023-02-16 VITALS
DIASTOLIC BLOOD PRESSURE: 77 MMHG | RESPIRATION RATE: 16 BRPM | BODY MASS INDEX: 29.35 KG/M2 | WEIGHT: 176.37 LBS | TEMPERATURE: 97.3 F | HEART RATE: 95 BPM | OXYGEN SATURATION: 99 % | SYSTOLIC BLOOD PRESSURE: 120 MMHG

## 2023-02-16 LAB
BASOPHILS # BLD AUTO: 0.01 K/UL — SIGNIFICANT CHANGE UP (ref 0–0.2)
BASOPHILS NFR BLD AUTO: 0.1 % — SIGNIFICANT CHANGE UP (ref 0–2)
EOSINOPHIL # BLD AUTO: 0 K/UL — SIGNIFICANT CHANGE UP (ref 0–0.5)
EOSINOPHIL NFR BLD AUTO: 0 % — SIGNIFICANT CHANGE UP (ref 0–6)
HCT VFR BLD CALC: 34.1 % — LOW (ref 39–50)
HGB BLD-MCNC: 11 G/DL — LOW (ref 13–17)
IMM GRANULOCYTES NFR BLD AUTO: 1.4 % — HIGH (ref 0–0.9)
LYMPHOCYTES # BLD AUTO: 0.57 K/UL — LOW (ref 1–3.3)
LYMPHOCYTES # BLD AUTO: 5 % — LOW (ref 13–44)
MCHC RBC-ENTMCNC: 32.3 G/DL — SIGNIFICANT CHANGE UP (ref 32–36)
MCHC RBC-ENTMCNC: 33.8 PG — SIGNIFICANT CHANGE UP (ref 27–34)
MCV RBC AUTO: 104.9 FL — HIGH (ref 80–100)
MONOCYTES # BLD AUTO: 0.17 K/UL — SIGNIFICANT CHANGE UP (ref 0–0.9)
MONOCYTES NFR BLD AUTO: 1.5 % — LOW (ref 2–14)
NEUTROPHILS # BLD AUTO: 10.45 K/UL — HIGH (ref 1.8–7.4)
NEUTROPHILS NFR BLD AUTO: 92 % — HIGH (ref 43–77)
NRBC # BLD: 0 /100 WBCS — SIGNIFICANT CHANGE UP (ref 0–0)
PLATELET # BLD AUTO: 132 K/UL — LOW (ref 150–400)
RBC # BLD: 3.25 M/UL — LOW (ref 4.2–5.8)
RBC # FLD: 16.5 % — HIGH (ref 10.3–14.5)
SARS-COV-2 N GENE NPH QL NAA+PROBE: NOT DETECTED
WBC # BLD: 11.36 K/UL — HIGH (ref 3.8–10.5)
WBC # FLD AUTO: 11.36 K/UL — HIGH (ref 3.8–10.5)

## 2023-02-16 PROCEDURE — 99213 OFFICE O/P EST LOW 20 MIN: CPT

## 2023-02-16 NOTE — PROGRESS NOTE ADULT - SUBJECTIVE AND OBJECTIVE BOX
High Dose Vitamin A Counseling: Side effects reviewed, pt to contact office should one occur. Erythromycin Pregnancy And Lactation Text: This medication is Pregnancy Category B and is considered safe during pregnancy. It is also excreted in breast milk. House Medicine NP    Patient is a 67y old  Male who presents with a chief complaint of Transfer from [facility] for management of ADHF/ LVAD eval (2017 00:32)      HPI:  66y/o M with advanced NICM BiV HFrEF 15-20% on astable dose of  home milrinone at 3mcg/kg/min via PICC s/p AICD, with replacement from [employer] to [employer] (), PAF on AC and h/o VT, with recent admission for AICD firing, now presenting with dizziness and nausea/vomiting X 1 Day.  Maintained on Milrinone drip and given IV lasix . Pt 5/6 found to be more SOB , increased fatique and not progressing well. pt transferred to CCU for closer management and up titration of milrinone 5mcg .   This am found to have and elevated INR     PAST MEDICAL & SURGICAL HISTORY:  H/O prior ablation treatment: for Afib  Ventricular fibrillation: s/p AICD  PAF (paroxysmal atrial fibrillation): on xarelto  Non-Ischemic Cardiomyopathy  SVT (Supraventricular Tachycardia)  HTN  CHF (Congestive Heart Failure)  Status post left hip replacement  Hypertension  Hypertension  History of Prior Ablation Treatment: for afib  AICD (Automatic Cardioverter/Defibrillator) Present: [employer] with 1 [employer] lead09- explanted and replaced with [employer] 2 leads on 09      MEDICATIONS  (STANDING):  mexiletine 150milliGRAM(s) Oral three times a day  docusate sodium 100milliGRAM(s) Oral three times a day  senna 2Tablet(s) Oral at bedtime  multivitamin/minerals 1Tablet(s) Oral daily  calcium carbonate  625 mG + Vitamin D (OsCal 250 + D) 1Tablet(s) Oral daily  folic acid 1milliGRAM(s) Oral daily  ascorbic acid 500milliGRAM(s) Oral daily  metoprolol succinate ER 25milliGRAM(s) Oral daily  furosemide Infusion 15mG/Hr IV Continuous <Continuous>  milrinone Infusion 0.5MICROgram(s)/kG/Min IV Continuous <Continuous>    MEDICATIONS  (PRN):  bisacodyl 5milliGRAM(s) Oral every 12 hours PRN Constipation            REVIEW OF SYSTEMS:  CONSTITUTIONAL: fatigue  EYES: No eye pain, visual disturbances, or discharge  ENMT:  No difficulty hearing, tinnitus, vertigo; No sinus or throat pain  NECK: No pain or stiffness  BREASTS: No pain, masses, or nipple discharge  RESPIRATORY: SOB   CARDIOVASCULAR: No chest pain, palpitations, dizziness, or leg swelling  GASTROINTESTINAL: No abdominal or epigastric pain. No nausea, vomiting, or hematemesis; No diarrhea or constipation. No melena or hematochezia.  GENITOURINARY: No dysuria, frequency, hematuria, or incontinence  NEUROLOGICAL: No headaches, memory loss, loss of strength, numbness, or tremors  SKIN: No itching, burning, rashes, or lesions   LYMPH NODES: No enlarged glands  ENDOCRINE: No heat or cold intolerance; No hair loss  MUSCULOSKELETAL: No joint pain or swelling; No muscle, back, or extremity pain  Vital Signs Last 24 Hrs      T(C): 36.9, Max: 36.9 ( @ 07:00)  T(F): 98.4, Max: 98.4 ( @ 07:00)  HR: 90 (88 - 96)  BP: 90/74 (72/55 - 99/56)  BP(mean): 78 (60 - 78)  RR: 24 (11 - 27)  SpO2: 100% (93% - 100%)    Daily Weight in k.7 (2017 04:00)      PHYSICAL EXAM:  GENERAL: NAD, well-groomed, well-developed  HEAD:  Atraumatic, Normocephalic  EYES: EOMI, PERRLA, conjunctiva and sclera clear  ENMT: No tonsillar erythema, exudates, or enlargement; Moist mucous membranes, Good dentition, No lesions  NECK: mod JVD   NERVOUS SYSTEM:  Alert & Oriented X3, Good concentration; Motor Strength 5/5 B/L upper and lower extremities; DTRs 2+ intact and symmetric  CHEST/LUNG: Clear to percussion bilaterally;  HEART: Regular rate and rhythm; No murmurs, rubs, or gallops  ABDOMEN: Soft, Nontender, distended; Bowel sounds present  EXTREMITIES:  2+ Peripheral Pulses, No clubbing, cyanosis, or edema  LYMPH: No lymphadenopathy noted  SKIN: No rashes or lesions    LABS:                        9.1    6.3   )-----------( 391      ( 2017 04:44 )             30.3     06-07    131<L>  |  94<L>  |  57<H>  ----------------------------<  93  4.6   |  19<L>  |  2.15<H>    Ca    9.2      2017 04:44  Phos  4.6     06-  Mg     2.6     06-    TPro  7.1  /  Alb  3.4  /  TBili  1.8<H>  /  DBili  x   /  AST  99<H>  /  ALT  62<H>  /  AlkPhos  98  06-07    PT/INR - ( 2017 05:43 )   PT: 80.4 sec;   INR: 7.09 ratio           Assessment: 66y/o M with advanced NICM BiV HFrEF 15-20%     Plan:  continue milrinone drip   ultrasound abd         Care Discussed with Consultants/Other Providers [ ] YES  [ ] NO Doxycycline Counseling:  Patient counseled regarding possible photosensitivity and increased risk for sunburn.  Patient instructed to avoid sunlight, if possible.  When exposed to sunlight, patients should wear protective clothing, sunglasses, and sunscreen.  The patient was instructed to call the office immediately if the following severe adverse effects occur:  hearing changes, easy bruising/bleeding, severe headache, or vision changes.  The patient verbalized understanding of the proper use and possible adverse effects of doxycycline.  All of the patient's questions and concerns were addressed. House Medicine NP    Patient is a 67y old  Male who presents with a chief complaint of Transfer from [facility] for management of ADHF/ LVAD eval (2017 00:32)      HPI:  68y/o M with advanced NICM BiV HFrEF 15-20% on astable dose of  home milrinone at 3mcg/kg/min via PICC s/p AICD, with replacement from [facility] to [employer] (), PAF on AC and h/o VT, with recent admission for AICD firing, now presenting with dizziness and nausea/vomiting X 1 Day.  Maintained on Milrinone drip and given IV lasix . Pt 5/6 found to be more SOB , increased fatique and not progressing well. pt transferred to CCU for closer management and up titration of milrinone 5mcg .   This am found to have and elevated INR     PAST MEDICAL & SURGICAL HISTORY:  H/O prior ablation treatment: for Afib  Ventricular fibrillation: s/p AICD  PAF (paroxysmal atrial fibrillation): on xarelto  Non-Ischemic Cardiomyopathy  SVT (Supraventricular Tachycardia)  HTN  CHF (Congestive Heart Failure)  Status post left hip replacement  Hypertension  Hypertension  History of Prior Ablation Treatment: for afib  AICD (Automatic Cardioverter/Defibrillator) Present: [employer] with 1 [employer] lead09- explanted and replaced with [employer] 2 leads on 09      MEDICATIONS  (STANDING):  mexiletine 150milliGRAM(s) Oral three times a day  docusate sodium 100milliGRAM(s) Oral three times a day  senna 2Tablet(s) Oral at bedtime  multivitamin/minerals 1Tablet(s) Oral daily  calcium carbonate  625 mG + Vitamin D (OsCal 250 + D) 1Tablet(s) Oral daily  folic acid 1milliGRAM(s) Oral daily  ascorbic acid 500milliGRAM(s) Oral daily  metoprolol succinate ER 25milliGRAM(s) Oral daily  furosemide Infusion 15mG/Hr IV Continuous <Continuous>  milrinone Infusion 0.5MICROgram(s)/kG/Min IV Continuous <Continuous>    MEDICATIONS  (PRN):  bisacodyl 5milliGRAM(s) Oral every 12 hours PRN Constipation            REVIEW OF SYSTEMS:  CONSTITUTIONAL: fatigue  EYES: No eye pain, visual disturbances, or discharge  ENMT:  No difficulty hearing, tinnitus, vertigo; No sinus or throat pain  NECK: No pain or stiffness  BREASTS: No pain, masses, or nipple discharge  RESPIRATORY: SOB   CARDIOVASCULAR: No chest pain, palpitations, dizziness, or leg swelling  GASTROINTESTINAL: No abdominal or epigastric pain. No nausea, vomiting, or hematemesis; No diarrhea or constipation. No melena or hematochezia.  GENITOURINARY: No dysuria, frequency, hematuria, or incontinence  NEUROLOGICAL: No headaches, memory loss, loss of strength, numbness, or tremors  SKIN: No itching, burning, rashes, or lesions   LYMPH NODES: No enlarged glands  ENDOCRINE: No heat or cold intolerance; No hair loss  MUSCULOSKELETAL: No joint pain or swelling; No muscle, back, or extremity pain  Vital Signs Last 24 Hrs      T(C): 36.9, Max: 36.9 ( @ 07:00)  T(F): 98.4, Max: 98.4 ( @ 07:00)  HR: 90 (88 - 96)  BP: 90/74 (72/55 - 99/56)  BP(mean): 78 (60 - 78)  RR: 24 (11 - 27)  SpO2: 100% (93% - 100%)    Daily Weight in k.7 (2017 04:00)  weight 73.7 17  Tele: Ns      PHYSICAL EXAM:  GENERAL: NAD, well-groomed, well-developed  HEAD:  Atraumatic, Normocephalic  EYES: EOMI, PERRLA, conjunctiva and sclera clear  ENMT: No tonsillar erythema, exudates, or enlargement; Moist mucous membranes, Good dentition, No lesions  NECK: mod JVD   NERVOUS SYSTEM:  Alert & Oriented X3, Good concentration; Motor Strength 5/5 B/L upper and lower extremities; DTRs 2+ intact and symmetric  CHEST/LUNG: Clear to percussion bilaterally;  HEART: Regular rate and rhythm; No murmurs, rubs, or gallops  ABDOMEN: Soft, Nontender, distended; Bowel sounds present  EXTREMITIES:  2+ Peripheral Pulses, No clubbing, cyanosis, or edema  LYMPH: No lymphadenopathy noted  SKIN: No rashes or lesions    LABS:                        9.1    6.3   )-----------( 391      ( 2017 04:44 )             30.3     -    131<L>  |  94<L>  |  57<H>  ----------------------------<  93  4.6   |  19<L>  |  2.15<H>    Ca    9.2      2017 04:44  Phos  4.6     06-  Mg     2.6         TPro  7.1  /  Alb  3.4  /  TBili  1.8<H>  /  DBili  x   /  AST  99<H>  /  ALT  62<H>  /  AlkPhos  98      PT/INR - ( 2017 05:43 )   PT: 80.4 sec;   INR: 7.09 ratio           Assessment: 68y/o M with advanced NICM BiV HFrEF 15-20%     Plan:  continue milrinone drip   ultrasound abd   continue LVAD evaluation       Care Discussed with Consultants/Other Providers [x ] YES  [ ] NO Sarecycline Counseling: Patient advised regarding possible photosensitivity and discoloration of the teeth, skin, lips, tongue and gums.  Patient instructed to avoid sunlight, if possible.  When exposed to sunlight, patients should wear protective clothing, sunglasses, and sunscreen.  The patient was instructed to call the office immediately if the following severe adverse effects occur:  hearing changes, easy bruising/bleeding, severe headache, or vision changes.  The patient verbalized understanding of the proper use and possible adverse effects of sarecycline.  All of the patient's questions and concerns were addressed. Spironolactone Counseling: Patient advised regarding risks of diarrhea, abdominal pain, hyperkalemia, birth defects (for female patients), liver toxicity and renal toxicity. The patient may need blood work to monitor liver and kidney function and potassium levels while on therapy. The patient verbalized understanding of the proper use and possible adverse effects of spironolactone.  All of the patient's questions and concerns were addressed. Azithromycin Pregnancy And Lactation Text: This medication is considered safe during pregnancy and is also secreted in breast milk. Topical Retinoid counseling:  Patient advised to apply a pea-sized amount only at bedtime and wait 30 minutes after washing their face before applying.  If too drying, patient may add a non-comedogenic moisturizer. The patient verbalized understanding of the proper use and possible adverse effects of retinoids.  All of the patient's questions and concerns were addressed. Dapsone Counseling: I discussed with the patient the risks of dapsone including but not limited to hemolytic anemia, agranulocytosis, rashes, methemoglobinemia, kidney failure, peripheral neuropathy, headaches, GI upset, and liver toxicity.  Patients who start dapsone require monitoring including baseline LFTs and weekly CBCs for the first month, then every month thereafter.  The patient verbalized understanding of the proper use and possible adverse effects of dapsone.  All of the patient's questions and concerns were addressed. Erythromycin Counseling:  I discussed with the patient the risks of erythromycin including but not limited to GI upset, allergic reaction, drug rash, diarrhea, increase in liver enzymes, and yeast infections. Tetracycline Counseling: Patient counseled regarding possible photosensitivity and increased risk for sunburn.  Patient instructed to avoid sunlight, if possible.  When exposed to sunlight, patients should wear protective clothing, sunglasses, and sunscreen.  The patient was instructed to call the office immediately if the following severe adverse effects occur:  hearing changes, easy bruising/bleeding, severe headache, or vision changes.  The patient verbalized understanding of the proper use and possible adverse effects of tetracycline.  All of the patient's questions and concerns were addressed. Patient understands to avoid pregnancy while on therapy due to potential birth defects. Spironolactone Pregnancy And Lactation Text: This medication can cause feminization of the male fetus and should be avoided during pregnancy. The active metabolite is also found in breast milk. Topical Retinoid Pregnancy And Lactation Text: This medication is Pregnancy Category C. It is unknown if this medication is excreted in breast milk. Dapsone Pregnancy And Lactation Text: This medication is Pregnancy Category C and is not considered safe during pregnancy or breast feeding. Winlevi Counseling:  I discussed with the patient the risks of topical clascoterone including but not limited to erythema, scaling, itching, and stinging. Patient voiced their understanding. Aklief Pregnancy And Lactation Text: It is unknown if this medication is safe to use during pregnancy.  It is unknown if this medication is excreted in breast milk.  Breastfeeding women should use the topical cream on the smallest area of the skin for the shortest time needed while breastfeeding.  Do not apply to nipple and areola. Minocycline Counseling: Patient advised regarding possible photosensitivity and discoloration of the teeth, skin, lips, tongue and gums.  Patient instructed to avoid sunlight, if possible.  When exposed to sunlight, patients should wear protective clothing, sunglasses, and sunscreen.  The patient was instructed to call the office immediately if the following severe adverse effects occur:  hearing changes, easy bruising/bleeding, severe headache, or vision changes.  The patient verbalized understanding of the proper use and possible adverse effects of minocycline.  All of the patient's questions and concerns were addressed. Isotretinoin Pregnancy And Lactation Text: This medication is Pregnancy Category X and is considered extremely dangerous during pregnancy. It is unknown if it is excreted in breast milk. Benzoyl Peroxide Counseling: Patient counseled that medicine may cause skin irritation and bleach clothing.  In the event of skin irritation, the patient was advised to reduce the amount of the drug applied or use it less frequently.   The patient verbalized understanding of the proper use and possible adverse effects of benzoyl peroxide.  All of the patient's questions and concerns were addressed. Minocycline Pregnancy And Lactation Text: This medication is Pregnancy Category D and not consider safe during pregnancy. It is also excreted in breast milk. Topical Sulfur Applications Counseling: Topical Sulfur Counseling: Patient counseled that this medication may cause skin irritation or allergic reactions.  In the event of skin irritation, the patient was advised to reduce the amount of the drug applied or use it less frequently.   The patient verbalized understanding of the proper use and possible adverse effects of topical sulfur application.  All of the patient's questions and concerns were addressed. Tazorac Pregnancy And Lactation Text: This medication is not safe during pregnancy. It is unknown if this medication is excreted in breast milk. Detail Level: Detailed Bactrim Counseling:  I discussed with the patient the risks of sulfa antibiotics including but not limited to GI upset, allergic reaction, drug rash, diarrhea, dizziness, photosensitivity, and yeast infections.  Rarely, more serious reactions can occur including but not limited to aplastic anemia, agranulocytosis, methemoglobinemia, blood dyscrasias, liver or kidney failure, lung infiltrates or desquamative/blistering drug rashes. Doxycycline Pregnancy And Lactation Text: This medication is Pregnancy Category D and not consider safe during pregnancy. It is also excreted in breast milk but is considered safe for shorter treatment courses. High Dose Vitamin A Pregnancy And Lactation Text: High dose vitamin A therapy is contraindicated during pregnancy and breast feeding. Winlevi Pregnancy And Lactation Text: This medication is considered safe during pregnancy and breastfeeding. Topical Sulfur Applications Pregnancy And Lactation Text: This medication is Pregnancy Category C and has an unknown safety profile during pregnancy. It is unknown if this topical medication is excreted in breast milk. Aklief counseling:  Patient advised to apply a pea-sized amount only at bedtime and wait 30 minutes after washing their face before applying.  If too drying, patient may add a non-comedogenic moisturizer.  The most commonly reported side effects including irritation, redness, scaling, dryness, stinging, burning, itching, and increased risk of sunburn.  The patient verbalized understanding of the proper use and possible adverse effects of retinoids.  All of the patient's questions and concerns were addressed. Azithromycin Counseling:  I discussed with the patient the risks of azithromycin including but not limited to GI upset, allergic reaction, drug rash, diarrhea, and yeast infections. Include Pregnancy/Lactation Warning?: No Topical Clindamycin Pregnancy And Lactation Text: This medication is Pregnancy Category B and is considered safe during pregnancy. It is unknown if it is excreted in breast milk. Bactrim Pregnancy And Lactation Text: This medication is Pregnancy Category D and is known to cause fetal risk.  It is also excreted in breast milk. Birth Control Pills Pregnancy And Lactation Text: This medication should be avoided if pregnant and for the first 30 days post-partum. Birth Control Pills Counseling: Birth Control Pill Counseling: I discussed with the patient the potential side effects of OCPs including but not limited to increased risk of stroke, heart attack, thrombophlebitis, deep venous thrombosis, hepatic adenomas, breast changes, GI upset, headaches, and depression.  The patient verbalized understanding of the proper use and possible adverse effects of OCPs. All of the patient's questions and concerns were addressed. Tazorac Counseling:  Patient advised that medication is irritating and drying.  Patient may need to apply sparingly and wash off after an hour before eventually leaving it on overnight.  The patient verbalized understanding of the proper use and possible adverse effects of tazorac.  All of the patient's questions and concerns were addressed. Benzoyl Peroxide Pregnancy And Lactation Text: This medication is Pregnancy Category C. It is unknown if benzoyl peroxide is excreted in breast milk. Isotretinoin Counseling: Patient should get monthly blood tests, not donate blood, not drive at night if vision affected, not share medication, and not undergo elective surgery for 6 months after tx completed. Side effects reviewed, pt to contact office should one occur. Azelaic Acid Counseling: Patient counseled that medicine may cause skin irritation and to avoid applying near the eyes.  In the event of skin irritation, the patient was advised to reduce the amount of the drug applied or use it less frequently.   The patient verbalized understanding of the proper use and possible adverse effects of azelaic acid.  All of the patient's questions and concerns were addressed. Azelaic Acid Pregnancy And Lactation Text: This medication is considered safe during pregnancy and breast feeding. Topical Clindamycin Counseling: Patient counseled that this medication may cause skin irritation or allergic reactions.  In the event of skin irritation, the patient was advised to reduce the amount of the drug applied or use it less frequently.   The patient verbalized understanding of the proper use and possible adverse effects of clindamycin.  All of the patient's questions and concerns were addressed.

## 2023-02-17 ENCOUNTER — TRANSCRIPTION ENCOUNTER (OUTPATIENT)
Age: 73
End: 2023-02-17

## 2023-02-17 ENCOUNTER — OUTPATIENT (OUTPATIENT)
Dept: OUTPATIENT SERVICES | Facility: HOSPITAL | Age: 73
LOS: 1 days | End: 2023-02-17
Payer: MEDICARE

## 2023-02-17 VITALS
TEMPERATURE: 98 F | HEART RATE: 96 BPM | DIASTOLIC BLOOD PRESSURE: 89 MMHG | WEIGHT: 175.05 LBS | HEIGHT: 67 IN | RESPIRATION RATE: 16 BRPM | OXYGEN SATURATION: 99 % | SYSTOLIC BLOOD PRESSURE: 133 MMHG

## 2023-02-17 VITALS
RESPIRATION RATE: 16 BRPM | SYSTOLIC BLOOD PRESSURE: 145 MMHG | OXYGEN SATURATION: 97 % | DIASTOLIC BLOOD PRESSURE: 85 MMHG | HEART RATE: 89 BPM

## 2023-02-17 DIAGNOSIS — Z94.1 HEART TRANSPLANT STATUS: Chronic | ICD-10-CM

## 2023-02-17 DIAGNOSIS — Z94.1 HEART TRANSPLANT STATUS: ICD-10-CM

## 2023-02-17 LAB
A1C WITH ESTIMATED AVERAGE GLUCOSE RESULT: 4.7 % — SIGNIFICANT CHANGE UP (ref 4–5.6)
ALBUMIN SERPL ELPH-MCNC: 3.9 G/DL
ALP BLD-CCNC: 73 U/L
ALT SERPL-CCNC: 29 U/L
ANION GAP SERPL CALC-SCNC: 11 MMOL/L
AST SERPL-CCNC: 18 U/L
BILIRUB SERPL-MCNC: 1 MG/DL
BUN SERPL-MCNC: 42 MG/DL
CALCIUM SERPL-MCNC: 9 MG/DL
CHLORIDE SERPL-SCNC: 108 MMOL/L
CHOLEST SERPL-MCNC: 143 MG/DL — SIGNIFICANT CHANGE UP
CMV DNA CSF QL NAA+PROBE: SIGNIFICANT CHANGE UP
CMV DNA SPEC NAA+PROBE-LOG#: ABNORMAL LOG10IU/ML
CO2 SERPL-SCNC: 21 MMOL/L
CREAT SERPL-MCNC: 1.7 MG/DL
EGFR: 42 ML/MIN/1.73M2
ESTIMATED AVERAGE GLUCOSE: 88 MG/DL — SIGNIFICANT CHANGE UP (ref 68–114)
GLUCOSE SERPL-MCNC: 329 MG/DL
HDLC SERPL-MCNC: 69 MG/DL — SIGNIFICANT CHANGE UP
LIPID PNL WITH DIRECT LDL SERPL: 51 MG/DL — SIGNIFICANT CHANGE UP
NON HDL CHOLESTEROL: 74 MG/DL — SIGNIFICANT CHANGE UP
POTASSIUM SERPL-SCNC: 4.4 MMOL/L
PROT SERPL-MCNC: 5.9 G/DL
SODIUM SERPL-SCNC: 141 MMOL/L
TACROLIMUS SERPL-MCNC: 10.1 NG/ML — SIGNIFICANT CHANGE UP
TRIGL SERPL-MCNC: 116 MG/DL — SIGNIFICANT CHANGE UP

## 2023-02-17 PROCEDURE — 83036 HEMOGLOBIN GLYCOSYLATED A1C: CPT

## 2023-02-17 PROCEDURE — 92978 ENDOLUMINL IVUS OCT C 1ST: CPT | Mod: RC

## 2023-02-17 PROCEDURE — C1894: CPT

## 2023-02-17 PROCEDURE — 93010 ELECTROCARDIOGRAM REPORT: CPT | Mod: 77

## 2023-02-17 PROCEDURE — 93010 ELECTROCARDIOGRAM REPORT: CPT

## 2023-02-17 PROCEDURE — 93005 ELECTROCARDIOGRAM TRACING: CPT

## 2023-02-17 PROCEDURE — C1769: CPT

## 2023-02-17 PROCEDURE — 80061 LIPID PANEL: CPT

## 2023-02-17 PROCEDURE — 99152 MOD SED SAME PHYS/QHP 5/>YRS: CPT

## 2023-02-17 PROCEDURE — 93458 L HRT ARTERY/VENTRICLE ANGIO: CPT | Mod: 26

## 2023-02-17 PROCEDURE — 92978 ENDOLUMINL IVUS OCT C 1ST: CPT | Mod: 26,RC

## 2023-02-17 PROCEDURE — C1887: CPT

## 2023-02-17 PROCEDURE — 93458 L HRT ARTERY/VENTRICLE ANGIO: CPT

## 2023-02-17 PROCEDURE — C1753: CPT

## 2023-02-17 PROCEDURE — 36415 COLL VENOUS BLD VENIPUNCTURE: CPT

## 2023-02-17 PROCEDURE — 80197 ASSAY OF TACROLIMUS: CPT

## 2023-02-17 RX ORDER — SODIUM CHLORIDE 9 MG/ML
1000 INJECTION INTRAMUSCULAR; INTRAVENOUS; SUBCUTANEOUS
Refills: 0 | Status: DISCONTINUED | OUTPATIENT
Start: 2023-02-17 | End: 2023-03-03

## 2023-02-17 RX ORDER — SODIUM CHLORIDE 9 MG/ML
250 INJECTION INTRAMUSCULAR; INTRAVENOUS; SUBCUTANEOUS ONCE
Refills: 0 | Status: COMPLETED | OUTPATIENT
Start: 2023-02-17 | End: 2023-02-17

## 2023-02-17 RX ADMIN — SODIUM CHLORIDE 250 MILLILITER(S): 9 INJECTION INTRAMUSCULAR; INTRAVENOUS; SUBCUTANEOUS at 11:00

## 2023-02-17 RX ADMIN — SODIUM CHLORIDE 75 MILLILITER(S): 9 INJECTION INTRAMUSCULAR; INTRAVENOUS; SUBCUTANEOUS at 12:00

## 2023-02-17 NOTE — H&P CARDIOLOGY - CONSTITUTIONAL DETAILS
Chest x-ray per radiology:  1.  No acute cardiopulmonary disease.  2.  Nodular density on lateral view overlying the anterior lung base, recommend follow-up 2-view chest in 3 months for evaluation of stability.  3.  Atherosclerosis    Left voicemail with results of incidental finding of nodular density and need for follow-up.  I left a callback number for any questions.   well-groomed/no distress

## 2023-02-17 NOTE — ASU DISCHARGE PLAN (ADULT/PEDIATRIC) - NS MD DC FALL RISK RISK
For information on Fall & Injury Prevention, visit: [email or web address] OR  [email or web address] OR  https://www.cdc.gov/steadi/patient.html

## 2023-02-17 NOTE — ASU DISCHARGE PLAN (ADULT/PEDIATRIC) - CARE PROVIDER_API CALL
Marvin Campbell  Cardiovascular Diseases  [address] [place] [id number]  Phone: [phone]  Fax: [phone]  Established Patient  Follow Up Time: 2 weeks

## 2023-02-17 NOTE — ASU DISCHARGE PLAN (ADULT/PEDIATRIC) - ASU DC SPECIAL INSTRUCTIONSFT
WOUND CARE:  The day after your procedure:  - Remove the bandage at the site gently, clean with a small amount of soap and water, and pat try. Leave open to air.  - You may shower.  - Do not apply lotions, creams, ointments, powder, or perfumes to your incision site.  - Check your wrist or groin daily. A small amount of bruising or soreness is normal.  - Do not soak your site for 1 week (no baths, pools, tubs, etc.).    ACTIVITY:  For the next 24 hours:  - Do not drive or operate heavy machinery.   - Do not make any important decisions or sign legal documents.  - You may resume sexual activity in 48 hours, unless otherwise instructed by your cardiologist.    If your procedure was performed through the groin,  For the next 5 days:  - Limit climbing stairs.  - Do not soak in a bathtub or pool.  - Avoid strenuous activities (pushing, pulling, straining).  - Do not lift anything more than 10 pounds.     MEDICATION:   - Take your medications as explained (see discharge paperwork).  - If you had a stent placed, you will be taking antiplatelet medications to keep your stent open (examples: Aspirin, Plavix, Brilinta, Effient). Take your medications as prescribed. Do not stop taking them without consulting with your cardiologist first.    ADDITIONAL INSTRUCTIONS:  - Follow a heart healthy diet recommended by your doctor.   - If you smoke, we encourage smoking cessation. You may call [phone] for center of tobacco control if you need assistance.  - Call your doctor to make appointment within 2 weeks.    ***CALL YOUR DOCTOR***  If you experience fever, chills, body aches, or severe pain, swelling, redness, heat, or yellow discharge from your incision site.  If you notice bleeding or significant swelling at the procedural site.  If you experience chest pain.  If you experience extremity numbness, tingling, or temperature change.    If you are unable to get in contact with your doctor, you may contact the Cardiology Office at [facility] at [phone]. You may also contact the [facility] at [phone] or [facility] at [phone].

## 2023-02-17 NOTE — H&P CARDIOLOGY - HISTORY OF PRESENT ILLNESS
This is a 71 year old male with PMH of HTN, HLD, NICM, chronic systolic heart failure s/p HM2 LVAD (6/2017) s/p heart transplant from Hep. C donor (treated) 2/23/18 (post op course complicated by graft dysfunction treated by plasmapheresis, IVIG, and rituximab), presents today for annual LHC/RHC/ IVUS and biopsy post transplant (fifth year), denies chest pain, dyspnea, palpitations or dizziness, orthopnea and PND. Tolerating room air.

## 2023-02-17 NOTE — ASU PATIENT PROFILE, ADULT - FALL HARM RISK - UNIVERSAL INTERVENTIONS
Bed in lowest position, wheels locked, appropriate side rails in place/Call bell, personal items and telephone in reach/Instruct patient to call for assistance before getting out of bed or chair/Non-slip footwear when patient is out of bed/[place] to call system/Physically safe environment - no spills, clutter or unnecessary equipment/Purposeful Proactive Rounding/Room/bathroom lighting operational, light cord in reach

## 2023-02-20 NOTE — PRE-OP CHECKLIST - SIDE RAILS UP
done Finasteride Pregnancy And Lactation Text: This medication is absolutely contraindicated during pregnancy. It is unknown if it is excreted in breast milk.

## 2023-02-21 ENCOUNTER — APPOINTMENT (OUTPATIENT)
Dept: RHEUMATOLOGY | Facility: CLINIC | Age: 73
End: 2023-02-21
Payer: MEDICARE

## 2023-02-21 VITALS
HEIGHT: 65 IN | HEART RATE: 105 BPM | WEIGHT: 176 LBS | DIASTOLIC BLOOD PRESSURE: 88 MMHG | SYSTOLIC BLOOD PRESSURE: 137 MMHG | OXYGEN SATURATION: 96 % | BODY MASS INDEX: 29.32 KG/M2 | TEMPERATURE: 97.6 F

## 2023-02-21 PROCEDURE — 99213 OFFICE O/P EST LOW 20 MIN: CPT

## 2023-02-21 NOTE — HISTORY OF PRESENT ILLNESS
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

## 2023-02-21 NOTE — ASSESSMENT
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 CKD 3 - under the care of nephro\par \par \par I reviewed previous labs results with patients.\par Laboratory tests ordered today\par Diagnosis and Prognosis discussed\par Continue with current medications\par medications refilled\par education provided on gout flare\par F/u 2 months\par

## 2023-02-22 ENCOUNTER — APPOINTMENT (OUTPATIENT)
Dept: HEMATOLOGY ONCOLOGY | Facility: CLINIC | Age: 73
End: 2023-02-22
Payer: MEDICARE

## 2023-02-22 ENCOUNTER — RESULT REVIEW (OUTPATIENT)
Age: 73
End: 2023-02-22

## 2023-02-22 VITALS
OXYGEN SATURATION: 99 % | TEMPERATURE: 97.9 F | SYSTOLIC BLOOD PRESSURE: 129 MMHG | RESPIRATION RATE: 16 BRPM | HEART RATE: 97 BPM | BODY MASS INDEX: 29.31 KG/M2 | HEIGHT: 65 IN | DIASTOLIC BLOOD PRESSURE: 83 MMHG | WEIGHT: 175.93 LBS

## 2023-02-22 LAB
BASOPHILS # BLD AUTO: 0 K/UL — SIGNIFICANT CHANGE UP (ref 0–0.2)
BASOPHILS NFR BLD AUTO: 0 % — SIGNIFICANT CHANGE UP (ref 0–2)
EOSINOPHIL # BLD AUTO: 0.01 K/UL — SIGNIFICANT CHANGE UP (ref 0–0.5)
EOSINOPHIL NFR BLD AUTO: 0.1 % — SIGNIFICANT CHANGE UP (ref 0–6)
HCT VFR BLD CALC: 37.3 % — LOW (ref 39–50)
HGB BLD-MCNC: 11.8 G/DL — LOW (ref 13–17)
IMM GRANULOCYTES NFR BLD AUTO: 0.7 % — SIGNIFICANT CHANGE UP (ref 0–0.9)
LYMPHOCYTES # BLD AUTO: 0.97 K/UL — LOW (ref 1–3.3)
LYMPHOCYTES # BLD AUTO: 10.2 % — LOW (ref 13–44)
MCHC RBC-ENTMCNC: 31.6 G/DL — LOW (ref 32–36)
MCHC RBC-ENTMCNC: 33.3 PG — SIGNIFICANT CHANGE UP (ref 27–34)
MCV RBC AUTO: 105.4 FL — HIGH (ref 80–100)
MONOCYTES # BLD AUTO: 0.22 K/UL — SIGNIFICANT CHANGE UP (ref 0–0.9)
MONOCYTES NFR BLD AUTO: 2.3 % — SIGNIFICANT CHANGE UP (ref 2–14)
NEUTROPHILS # BLD AUTO: 8.25 K/UL — HIGH (ref 1.8–7.4)
NEUTROPHILS NFR BLD AUTO: 86.7 % — HIGH (ref 43–77)
NRBC # BLD: 0 /100 WBCS — SIGNIFICANT CHANGE UP (ref 0–0)
PLATELET # BLD AUTO: 99 K/UL — LOW (ref 150–400)
RBC # BLD: 3.54 M/UL — LOW (ref 4.2–5.8)
RBC # FLD: 16.3 % — HIGH (ref 10.3–14.5)
WBC # BLD: 9.52 K/UL — SIGNIFICANT CHANGE UP (ref 3.8–10.5)
WBC # FLD AUTO: 9.52 K/UL — SIGNIFICANT CHANGE UP (ref 3.8–10.5)

## 2023-02-22 PROCEDURE — 99213 OFFICE O/P EST LOW 20 MIN: CPT

## 2023-02-22 NOTE — CONSULT LETTER
[Dear  ___] : Dear  [unfilled], [Courtesy Letter:] : I had the pleasure of seeing your patient, [unfilled], in my office today. [Please see my note below.] : Please see my note below. [Sincerely,] : Sincerely, [DrEduar  ___] : Dr. HAN [FreeTextEntry2] : Dr. Lisa Ac [FreeTextEntry3] : Tao Rosario M.D., FACP\par Professor of Medicine\par [employer] [facility] [employer]\par Associate Chief, Division of Hematology\par [facility]\par [facility]\par [address] [place] [address]\[place] [address]\par [phone]\par \par \par \par

## 2023-02-22 NOTE — PHYSICAL EXAM
[Restricted in physically strenuous activity but ambulatory and able to carry out work of a light or sedentary nature] : Status 1- Restricted in physically strenuous activity but ambulatory and able to carry out work of a light or sedentary nature, e.g., light house work, office work [Normal] : affect appropriate [de-identified] : RRR. S1S2 normal. Gr 2/6 holosystolic and holodiastolic murmur LLSB. No gallops.

## 2023-02-22 NOTE — HISTORY OF PRESENT ILLNESS
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 He was treated with Pulse Decadron and IVGG. He received 1 unit of packed red blood cells. and then prednisone was started. He was discharged on 1/7/23. Feels well.  No c/o. Denies chest pain, SOB, abdominal pain, jaundice, dark urine, melena, BRBPR, fever, night sweats, swollen glands, arthritis, rash, limb pain/swelling. Weight stable. Is being followed by cardiac transplant team.

## 2023-02-23 LAB
ALBUMIN SERPL ELPH-MCNC: 4 G/DL
ALP BLD-CCNC: 69 U/L
ALT SERPL-CCNC: 40 U/L
ANION GAP SERPL CALC-SCNC: 9 MMOL/L
AST SERPL-CCNC: 22 U/L
BILIRUB SERPL-MCNC: 0.8 MG/DL
BUN SERPL-MCNC: 35 MG/DL
CALCIUM SERPL-MCNC: 9.1 MG/DL
CHLORIDE SERPL-SCNC: 105 MMOL/L
CO2 SERPL-SCNC: 26 MMOL/L
CREAT SERPL-MCNC: 1.49 MG/DL
EGFR: 50 ML/MIN/1.73M2
GLUCOSE SERPL-MCNC: 215 MG/DL
POTASSIUM SERPL-SCNC: 5.1 MMOL/L
PROT SERPL-MCNC: 6.1 G/DL
SODIUM SERPL-SCNC: 140 MMOL/L

## 2023-02-23 NOTE — PROGRESS NOTE ADULT - PROBLEM SELECTOR PLAN 1
Detail Level: Detailed Heme following   Continue Prednisone 75mg QD  Continue folic acid 1 mg QD   Check CBC w/ diff, retic count, CMP, LDH and haptoglobin daily per heme   Ck Peripheral flow cytometry Heme following   Continue Prednisone 75mg QD  Continue folic acid 1 mg QD   F/u CMV PCR, Hep B PCR and Hep C PCR  Check CBC w/ diff, retic count, CMP, LDH and haptoglobin daily per heme   Ck Peripheral flow cytometry

## 2023-02-23 NOTE — PHYSICAL EXAM
[Restricted in physically strenuous activity but ambulatory and able to carry out work of a light or sedentary nature] : Status 1- Restricted in physically strenuous activity but ambulatory and able to carry out work of a light or sedentary nature, e.g., light house work, office work [Normal] : affect appropriate [de-identified] : RRR. S1S2 normal. Gr 2/6 holosystolic and holodiastolic murmur LLSB. No gallops.

## 2023-02-23 NOTE — HISTORY OF PRESENT ILLNESS
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 He was treated with Pulse Decadron and IVGG. He received 1 unit of packed red blood cells. and then prednisone was started. He was discharged on 1/7/23. Feels well.  No c/o. Denies chest pain, SOB, abdominal pain, jaundice, dark urine, melena, BRBPR, fever, night sweats, swollen glands, arthritis, rash, limb pain/swelling. Weight stable. Is being followed by cardiac transplant team.  Glucose noted to be 322 at last visit.  Reports arthritis in fingers.

## 2023-02-23 NOTE — ASSESSMENT
[Palliative Care Plan] : not applicable at this time [FreeTextEntry1] : 73 YO M S/P cardiac transplant developed mixed type autoimmune hemolytic anemia while on immunosuppression. Was in remission with well compensated hemolytic anemia on low dose prednisone, but relapsed after tapered to 3 mg daily. 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 Responded very well to minimal dose increase to 5 mg daily with rising hgb and platelet count, but then relapsed and was hospitalized with Hgb of 6.5.  Was started on prednisone 75 mg daily and is responding with improvement in counts.. Glucose noted to be 322 at last visit; will contact Endocrine about arranging coverage for this.\par \par Plan:\par Continue Prednisone 75 mg daily \par See Endocrine for hyperglycemia coverage while on Prednisone, call made to them.\par PCP prophylaxis as per Cardiology- is taking Bactrim\par CMP, LDH\par Tacrolimus/ ASA per Cardiology.\par Folic acid\par To ER prn \par RTC in 1 week\par

## 2023-02-23 NOTE — CONSULT LETTER
[Dear  ___] : Dear  [unfilled], [Courtesy Letter:] : I had the pleasure of seeing your patient, [unfilled], in my office today. [Please see my note below.] : Please see my note below. [Sincerely,] : Sincerely, [DrEduar  ___] : Dr. HAN [FreeTextEntry2] : Dr. Lisa Ac [FreeTextEntry3] : Tao Rosario M.D., FACP\par Professor of Medicine\par [employer] [facility] [employer]\par Associate Chief, Division of Hematology\par [facility]\par [facility]\par [address] [place] [address]\[place] [address]\par [phone]\par \par \par \par

## 2023-03-03 ENCOUNTER — INPATIENT (INPATIENT)
Facility: HOSPITAL | Age: 73
LOS: 14 days | Discharge: HOME | DRG: 871 | End: 2023-03-18
Attending: STUDENT IN AN ORGANIZED HEALTH CARE EDUCATION/TRAINING PROGRAM | Admitting: STUDENT IN AN ORGANIZED HEALTH CARE EDUCATION/TRAINING PROGRAM
Payer: MEDICARE

## 2023-03-03 VITALS
SYSTOLIC BLOOD PRESSURE: 120 MMHG | DIASTOLIC BLOOD PRESSURE: 82 MMHG | WEIGHT: 160.06 LBS | HEART RATE: 142 BPM | RESPIRATION RATE: 24 BRPM | OXYGEN SATURATION: 98 % | TEMPERATURE: 98 F | HEIGHT: 67 IN

## 2023-03-03 DIAGNOSIS — Z94.1 HEART TRANSPLANT STATUS: Chronic | ICD-10-CM

## 2023-03-03 DIAGNOSIS — Z79.2 LONG TERM (CURRENT) USE OF ANTIBIOTICS: ICD-10-CM

## 2023-03-03 DIAGNOSIS — Z94.1 HEART TRANSPLANT STATUS: ICD-10-CM

## 2023-03-03 DIAGNOSIS — D84.9 IMMUNODEFICIENCY, UNSPECIFIED: ICD-10-CM

## 2023-03-03 DIAGNOSIS — D58.9 HEREDITARY HEMOLYTIC ANEMIA, UNSPECIFIED: ICD-10-CM

## 2023-03-03 DIAGNOSIS — R00.0 TACHYCARDIA, UNSPECIFIED: ICD-10-CM

## 2023-03-03 LAB
ALBUMIN SERPL ELPH-MCNC: 3.3 G/DL — SIGNIFICANT CHANGE UP (ref 3.3–5)
ALP SERPL-CCNC: 66 U/L — SIGNIFICANT CHANGE UP (ref 40–120)
ALT FLD-CCNC: 30 U/L — SIGNIFICANT CHANGE UP (ref 10–45)
ANION GAP SERPL CALC-SCNC: 15 MMOL/L — SIGNIFICANT CHANGE UP (ref 5–17)
ANISOCYTOSIS BLD QL: SLIGHT — SIGNIFICANT CHANGE UP
AST SERPL-CCNC: 23 U/L — SIGNIFICANT CHANGE UP (ref 10–40)
BASE EXCESS BLDV CALC-SCNC: -4.9 MMOL/L — LOW (ref -2–3)
BASOPHILS # BLD AUTO: 0 K/UL — SIGNIFICANT CHANGE UP (ref 0–0.2)
BASOPHILS # BLD AUTO: 0 K/UL — SIGNIFICANT CHANGE UP (ref 0–0.2)
BASOPHILS NFR BLD AUTO: 0 % — SIGNIFICANT CHANGE UP (ref 0–2)
BASOPHILS NFR BLD AUTO: 0 % — SIGNIFICANT CHANGE UP (ref 0–2)
BILIRUB SERPL-MCNC: 1.7 MG/DL — HIGH (ref 0.2–1.2)
BUN SERPL-MCNC: 44 MG/DL — HIGH (ref 7–23)
CA-I SERPL-SCNC: 1.09 MMOL/L — LOW (ref 1.15–1.33)
CALCIUM SERPL-MCNC: 9.1 MG/DL — SIGNIFICANT CHANGE UP (ref 8.4–10.5)
CHLORIDE BLDV-SCNC: 104 MMOL/L — SIGNIFICANT CHANGE UP (ref 96–108)
CHLORIDE SERPL-SCNC: 98 MMOL/L — SIGNIFICANT CHANGE UP (ref 96–108)
CO2 BLDV-SCNC: 22 MMOL/L — SIGNIFICANT CHANGE UP (ref 22–26)
CO2 SERPL-SCNC: 20 MMOL/L — LOW (ref 22–31)
CREAT SERPL-MCNC: 2.04 MG/DL — HIGH (ref 0.5–1.3)
DACRYOCYTES BLD QL SMEAR: SLIGHT — SIGNIFICANT CHANGE UP
EGFR: 34 ML/MIN/1.73M2 — LOW
EOSINOPHIL # BLD AUTO: 0 K/UL — SIGNIFICANT CHANGE UP (ref 0–0.5)
EOSINOPHIL # BLD AUTO: 0 K/UL — SIGNIFICANT CHANGE UP (ref 0–0.5)
EOSINOPHIL NFR BLD AUTO: 0 % — SIGNIFICANT CHANGE UP (ref 0–6)
EOSINOPHIL NFR BLD AUTO: 0 % — SIGNIFICANT CHANGE UP (ref 0–6)
FLUAV AG NPH QL: SIGNIFICANT CHANGE UP
FLUBV AG NPH QL: SIGNIFICANT CHANGE UP
GAS PNL BLDV: 136 MMOL/L — SIGNIFICANT CHANGE UP (ref 136–145)
GAS PNL BLDV: SIGNIFICANT CHANGE UP
GLUCOSE BLDV-MCNC: 406 MG/DL — HIGH (ref 70–99)
GLUCOSE SERPL-MCNC: 544 MG/DL — CRITICAL HIGH (ref 70–99)
HCO3 BLDV-SCNC: 21 MMOL/L — LOW (ref 22–29)
HCT VFR BLD CALC: 32.9 % — LOW (ref 39–50)
HCT VFR BLD CALC: 39.8 % — SIGNIFICANT CHANGE UP (ref 39–50)
HCT VFR BLDA CALC: 34 % — LOW (ref 39–51)
HGB BLD CALC-MCNC: 11.2 G/DL — LOW (ref 12.6–17.4)
HGB BLD-MCNC: 10.7 G/DL — LOW (ref 13–17)
HGB BLD-MCNC: 13.3 G/DL — SIGNIFICANT CHANGE UP (ref 13–17)
LACTATE BLDV-MCNC: 2 MMOL/L — SIGNIFICANT CHANGE UP (ref 0.5–2)
LG PLATELETS BLD QL AUTO: SLIGHT — SIGNIFICANT CHANGE UP
LIDOCAIN IGE QN: 22 U/L — SIGNIFICANT CHANGE UP (ref 7–60)
LYMPHOCYTES # BLD AUTO: 0.63 K/UL — LOW (ref 1–3.3)
LYMPHOCYTES # BLD AUTO: 0.84 K/UL — LOW (ref 1–3.3)
LYMPHOCYTES # BLD AUTO: 10.7 % — LOW (ref 13–44)
LYMPHOCYTES # BLD AUTO: 13 % — SIGNIFICANT CHANGE UP (ref 13–44)
MACROCYTES BLD QL: SLIGHT — SIGNIFICANT CHANGE UP
MAGNESIUM SERPL-MCNC: 1.8 MG/DL — SIGNIFICANT CHANGE UP (ref 1.6–2.6)
MANUAL SMEAR VERIFICATION: SIGNIFICANT CHANGE UP
MANUAL SMEAR VERIFICATION: SIGNIFICANT CHANGE UP
MCHC RBC-ENTMCNC: 32.5 GM/DL — SIGNIFICANT CHANGE UP (ref 32–36)
MCHC RBC-ENTMCNC: 33.4 GM/DL — SIGNIFICANT CHANGE UP (ref 32–36)
MCHC RBC-ENTMCNC: 33.5 PG — SIGNIFICANT CHANGE UP (ref 27–34)
MCHC RBC-ENTMCNC: 33.6 PG — SIGNIFICANT CHANGE UP (ref 27–34)
MCV RBC AUTO: 100.3 FL — HIGH (ref 80–100)
MCV RBC AUTO: 103.5 FL — HIGH (ref 80–100)
MONOCYTES # BLD AUTO: 0.16 K/UL — SIGNIFICANT CHANGE UP (ref 0–0.9)
MONOCYTES # BLD AUTO: 0.19 K/UL — SIGNIFICANT CHANGE UP (ref 0–0.9)
MONOCYTES NFR BLD AUTO: 2.7 % — SIGNIFICANT CHANGE UP (ref 2–14)
MONOCYTES NFR BLD AUTO: 3 % — SIGNIFICANT CHANGE UP (ref 2–14)
NEUTROPHILS # BLD AUTO: 5.07 K/UL — SIGNIFICANT CHANGE UP (ref 1.8–7.4)
NEUTROPHILS # BLD AUTO: 5.36 K/UL — SIGNIFICANT CHANGE UP (ref 1.8–7.4)
NEUTROPHILS NFR BLD AUTO: 73 % — SIGNIFICANT CHANGE UP (ref 43–77)
NEUTROPHILS NFR BLD AUTO: 82.1 % — HIGH (ref 43–77)
NEUTS BAND # BLD: 10 % — HIGH (ref 0–8)
NEUTS BAND # BLD: 4.5 % — SIGNIFICANT CHANGE UP (ref 0–8)
NRBC # BLD: 0 /100 — SIGNIFICANT CHANGE UP (ref 0–0)
NT-PROBNP SERPL-SCNC: 3349 PG/ML — HIGH (ref 0–300)
OVALOCYTES BLD QL SMEAR: SLIGHT — SIGNIFICANT CHANGE UP
PCO2 BLDV: 42 MMHG — SIGNIFICANT CHANGE UP (ref 42–55)
PH BLDV: 7.31 — LOW (ref 7.32–7.43)
PLAT MORPH BLD: ABNORMAL
PLAT MORPH BLD: NORMAL — SIGNIFICANT CHANGE UP
PLATELET # BLD AUTO: 102 K/UL — LOW (ref 150–400)
PLATELET # BLD AUTO: 136 K/UL — LOW (ref 150–400)
PO2 BLDV: 26 MMHG — SIGNIFICANT CHANGE UP (ref 25–45)
POIKILOCYTOSIS BLD QL AUTO: SLIGHT — SIGNIFICANT CHANGE UP
POTASSIUM BLDV-SCNC: 3.8 MMOL/L — SIGNIFICANT CHANGE UP (ref 3.5–5.1)
POTASSIUM SERPL-MCNC: 4.9 MMOL/L — SIGNIFICANT CHANGE UP (ref 3.5–5.3)
POTASSIUM SERPL-SCNC: 4.9 MMOL/L — SIGNIFICANT CHANGE UP (ref 3.5–5.3)
PROT SERPL-MCNC: 6 G/DL — SIGNIFICANT CHANGE UP (ref 6–8.3)
RBC # BLD: 3.18 M/UL — LOW (ref 4.2–5.8)
RBC # BLD: 3.97 M/UL — LOW (ref 4.2–5.8)
RBC # FLD: 16 % — HIGH (ref 10.3–14.5)
RBC # FLD: 16.2 % — HIGH (ref 10.3–14.5)
RBC BLD AUTO: ABNORMAL
RBC BLD AUTO: SIGNIFICANT CHANGE UP
RSV RNA NPH QL NAA+NON-PROBE: SIGNIFICANT CHANGE UP
SAO2 % BLDV: 41.6 % — LOW (ref 67–88)
SARS-COV-2 RNA SPEC QL NAA+PROBE: SIGNIFICANT CHANGE UP
SODIUM SERPL-SCNC: 133 MMOL/L — LOW (ref 135–145)
TROPONIN T, HIGH SENSITIVITY RESULT: 60 NG/L — HIGH (ref 0–51)
TROPONIN T, HIGH SENSITIVITY RESULT: 63 NG/L — HIGH (ref 0–51)
TROPONIN T, HIGH SENSITIVITY RESULT: 80 NG/L — HIGH (ref 0–51)
VARIANT LYMPHS # BLD: 1 % — SIGNIFICANT CHANGE UP (ref 0–6)
WBC # BLD: 5.85 K/UL — SIGNIFICANT CHANGE UP (ref 3.8–10.5)
WBC # BLD: 6.46 K/UL — SIGNIFICANT CHANGE UP (ref 3.8–10.5)
WBC # FLD AUTO: 5.85 K/UL — SIGNIFICANT CHANGE UP (ref 3.8–10.5)
WBC # FLD AUTO: 6.46 K/UL — SIGNIFICANT CHANGE UP (ref 3.8–10.5)

## 2023-03-03 PROCEDURE — 70450 CT HEAD/BRAIN W/O DYE: CPT | Mod: 26,MA

## 2023-03-03 PROCEDURE — 99285 EMERGENCY DEPT VISIT HI MDM: CPT

## 2023-03-03 PROCEDURE — 71045 X-RAY EXAM CHEST 1 VIEW: CPT | Mod: 26

## 2023-03-03 PROCEDURE — 71250 CT THORAX DX C-: CPT | Mod: 26,MA

## 2023-03-03 PROCEDURE — 74176 CT ABD & PELVIS W/O CONTRAST: CPT | Mod: 26,MA

## 2023-03-03 RX ORDER — APIXABAN 2.5 MG/1
5 TABLET, FILM COATED ORAL ONCE
Refills: 0 | Status: DISCONTINUED | OUTPATIENT
Start: 2023-03-03 | End: 2023-03-03

## 2023-03-03 RX ORDER — METOPROLOL TARTRATE 50 MG
5 TABLET ORAL ONCE
Refills: 0 | Status: DISCONTINUED | OUTPATIENT
Start: 2023-03-03 | End: 2023-03-03

## 2023-03-03 RX ORDER — VANCOMYCIN HCL 1 G
1000 VIAL (EA) INTRAVENOUS ONCE
Refills: 0 | Status: COMPLETED | OUTPATIENT
Start: 2023-03-03 | End: 2023-03-03

## 2023-03-03 RX ORDER — METOPROLOL TARTRATE 50 MG
200 TABLET ORAL ONCE
Refills: 0 | Status: COMPLETED | OUTPATIENT
Start: 2023-03-03 | End: 2023-03-03

## 2023-03-03 RX ORDER — SODIUM CHLORIDE 9 MG/ML
1000 INJECTION INTRAMUSCULAR; INTRAVENOUS; SUBCUTANEOUS ONCE
Refills: 0 | Status: COMPLETED | OUTPATIENT
Start: 2023-03-03 | End: 2023-03-03

## 2023-03-03 RX ORDER — ASPIRIN/CALCIUM CARB/MAGNESIUM 324 MG
162 TABLET ORAL ONCE
Refills: 0 | Status: COMPLETED | OUTPATIENT
Start: 2023-03-03 | End: 2023-03-03

## 2023-03-03 RX ORDER — PIPERACILLIN AND TAZOBACTAM 4; .5 G/20ML; G/20ML
3.38 INJECTION, POWDER, LYOPHILIZED, FOR SOLUTION INTRAVENOUS ONCE
Refills: 0 | Status: COMPLETED | OUTPATIENT
Start: 2023-03-03 | End: 2023-03-03

## 2023-03-03 RX ADMIN — PIPERACILLIN AND TAZOBACTAM 200 GRAM(S): 4; .5 INJECTION, POWDER, LYOPHILIZED, FOR SOLUTION INTRAVENOUS at 22:13

## 2023-03-03 RX ADMIN — SODIUM CHLORIDE 1000 MILLILITER(S): 9 INJECTION INTRAMUSCULAR; INTRAVENOUS; SUBCUTANEOUS at 17:05

## 2023-03-03 RX ADMIN — Medication 162 MILLIGRAM(S): at 17:00

## 2023-03-03 RX ADMIN — Medication 250 MILLIGRAM(S): at 18:31

## 2023-03-03 RX ADMIN — Medication 200 MILLIGRAM(S): at 17:00

## 2023-03-03 NOTE — ED PROVIDER NOTE - PROGRESS NOTE DETAILS
Prieto Fallon, PGY2 Cardiology and heart failure at bedside.  Lower concern for V. tach currently.  Rec for 5 mg of IV Lopressor at this time.   Awaiting for EP Maximus before admission Prieto aFllon, PGY2    200 mg of Lopressor was given instead of the IV dose as patient has not taken his home medication.  Patient stating his chest pain has resolved.  Troponins 80.  Waiting for repeat. Ronit Klein, Attending Physician: Pt signed out to me by Dr. Pino pending labs, EP recs and admission. Pt's heart rate trending down after 200 mg Lopressor. Patient is mildly altered from baseline (which is change per collateral) as he doesn't know the president. Will obtain rectal temp. HR at this time 106, Blood pressure remains stable. Pulse ox 98.  S Ronit Klein, Attending Physician: Pt returned to ED from CT scan with HR 99. O2 sat 98 percent and RR 23 however patient is speaking in full sentences. CTs obtain to grossly eval for infection and CTH given AAOx1 which may be delerium. This patient meets 90 mins of CC time.

## 2023-03-03 NOTE — ED PROVIDER NOTE - OBJECTIVE STATEMENT
patient is a 72-year-old male with a history of heart transplant, previous LVAD, history of hemolysis who presents to the ED with chest pressure that began last night.  Patient here continues to feel chest pressure and found to be tachycardic to the 140s..   Patient denying any shortness of breath, nausea, vomiting, fevers, chills.  Patient states that he takes his antirejection meds regularly.  Did not take his home meds this morning.

## 2023-03-03 NOTE — CONSULT NOTE ADULT - SUBJECTIVE AND OBJECTIVE BOX
CHIEF COMPLAINT: "I had chest pain last night"    HISTORY OF PRESENT ILLNESS: 72 year old male with PMH of HTN, HLD, NICM, chronic systolic heart failure s/p HM2 LVAD (6/2017) s/p heart transplant from Hep. C donor (treated) 2/23/18 (post op course complicated by graft dysfunction treated by plasmapheresis, IVIG, and rituximab) Presenting today with chest tightness since last night which has been constant no associated shortness of breath or palpitations or lightheadedness or syncope.  Patient is well-appearing with stable blood pressure but noted to be in A flutter ~140. EP consulted for rhythm management, patient seen and evaluated in echo lab.     Allergies  No Known Allergies      MEDICATIONS:  aspirin 81 mg oral tablet:  (17 Feb 2023 11:27)  folic acid 1 mg oral tablet: 1 tab(s) orally once a day (17 Feb 2023 11:27)  losartan 100 mg oral tablet: 1 tab(s) orally once a day (17 Feb 2023 11:27)  metoprolol succinate 200 mg oral tablet, extended release: 1 tab(s) orally once a day (17 Feb 2023 11:27)  pantoprazole 40 mg oral delayed release tablet: 1 tab(s) orally once a day (17 Feb 2023 11:27)  polyethylene glycol 3350 oral powder for reconstitution: 17 gram(s) orally once a day, As needed, Constipation (17 Feb 2023 11:27)  pravastatin 20 mg oral tablet: 1 tab(s) orally once a day (17 Feb 2023 11:27)      PAST MEDICAL & SURGICAL HISTORY:  HTN  SVT (Supraventricular Tachycardia)  Non-Ischemic Cardiomyopathy  now s/p transplant 2018  GIB (gastrointestinal bleeding)  Hepatitis C virus  DVT of upper extremity (deep vein thrombosis)  Former smoker  HLD (hyperlipidemia)  Knee pain, right  H/O autoimmune hemolytic anemia  H/O hemolytic anemia  Status post left hip replacement  H/O heart transplant 2/2018    FAMILY HISTORY:      SOCIAL HISTORY:    [ ] Non-smoker  [X] Smoker- previous  [ ] Alcohol      REVIEW OF SYSTEMS:  See HPI. Otherwise, 10 point ROS done and otherwise negative.    PHYSICAL EXAM:  T(C): 36.8 (03-03-23 @ 13:54), Max: 36.8 (03-03-23 @ 13:54)  HR: 139 (03-03-23 @ 15:20) (139 - 142)  BP: 97/69 (03-03-23 @ 15:20) (97/69 - 120/82)  RR: 23 (03-03-23 @ 15:20) (23 - 24)  SpO2: 97% (03-03-23 @ 15:20) (97% - 98%)  Wt(kg): --  I&O's Summary      Appearance: Normal	  HEENT:   Normal oral mucosa, PERRL, EOMI	  Lymphatic: No lymphadenopathy  Cardiovascular: Normal S1 S2, No JVD, No murmurs, No edema  Respiratory: Crackles to LML and LLL on auscultation  Psychiatry: A & O x 1 (disoriented to time, place)  Gastrointestinal:  Soft, Non-tender, + BS	  Skin: No rashes, No ecchymoses, No cyanosis	  Extremities: Normal range of motion, No clubbing, cyanosis or edema  Vascular: Peripheral pulses palpable 2+ bilaterally        LABS:	 	    CBC Full  -  ( 03 Mar 2023 15:09 )  WBC Count : 6.46 K/uL  Hemoglobin : 13.3 g/dL  Hematocrit : 39.8 %  Platelet Count - Automated : 136 K/uL  Mean Cell Volume : 100.3 fl  Mean Cell Hemoglobin : 33.5 pg  Mean Cell Hemoglobin Concentration : 33.4 gm/dL  Auto Neutrophil # : x  Auto Lymphocyte # : x  Auto Monocyte # : x  Auto Eosinophil # : x  Auto Basophil # : x  Auto Neutrophil % : x  Auto Lymphocyte % : x  Auto Monocyte % : x  Auto Eosinophil % : x  Auto Basophil % : x    03-03    133<L>  |  98  |  44<H>  ----------------------------<  544<HH>  4.9   |  20<L>  |  2.04<H>    Ca    9.1      03 Mar 2023 15:09  Mg     1.8     03-03    TPro  6.0  /  Alb  3.3  /  TBili  1.7<H>  /  DBili  x   /  AST  23  /  ALT  30  /  AlkPhos  66  03-03      proBNP: Serum Pro-Brain Natriuretic Peptide: 3349 pg/mL (03-03 @ 15:09)    TELEMETRY: Atrial Flutter 140's	    ECG: Atrial flutter   	    PREVIOUS DIAGNOSTIC TESTING:    [X] Echocardiogram: pending    	       CHIEF COMPLAINT: "I had chest pain last night"    HISTORY OF PRESENT ILLNESS: 72 year old male with PMH of HTN, HLD, NICM, chronic systolic heart failure s/p HM2 LVAD (6/2017) s/p heart transplant from Hep. C donor (treated) 2/23/18 (post op course complicated by graft dysfunction treated by plasmapheresis, IVIG, and rituximab) Presenting today with chest tightness since last night which has been constant no associated shortness of breath or palpitations or lightheadedness or syncope.  Patient is well-appearing with stable blood pressure but noted to be in A flutter ~140. EP consulted for rhythm management, patient seen and evaluated in echo lab.     Allergies  No Known Allergies      MEDICATIONS:  aspirin 81 mg oral tablet:  (17 Feb 2023 11:27)  folic acid 1 mg oral tablet: 1 tab(s) orally once a day (17 Feb 2023 11:27)  losartan 100 mg oral tablet: 1 tab(s) orally once a day (17 Feb 2023 11:27)  metoprolol succinate 200 mg oral tablet, extended release: 1 tab(s) orally once a day (17 Feb 2023 11:27)  pantoprazole 40 mg oral delayed release tablet: 1 tab(s) orally once a day (17 Feb 2023 11:27)  polyethylene glycol 3350 oral powder for reconstitution: 17 gram(s) orally once a day, As needed, Constipation (17 Feb 2023 11:27)  pravastatin 20 mg oral tablet: 1 tab(s) orally once a day (17 Feb 2023 11:27)      PAST MEDICAL & SURGICAL HISTORY:  HTN  SVT (Supraventricular Tachycardia)  Non-Ischemic Cardiomyopathy  now s/p transplant 2018  GIB (gastrointestinal bleeding)  Hepatitis C virus  DVT of upper extremity (deep vein thrombosis)  Former smoker  HLD (hyperlipidemia)  Knee pain, right  H/O autoimmune hemolytic anemia  H/O hemolytic anemia  Status post left hip replacement  H/O heart transplant 2/2018    FAMILY HISTORY:      SOCIAL HISTORY:    [ ] Non-smoker  [X] Smoker- previous  [ ] Alcohol      REVIEW OF SYSTEMS:  See HPI. Otherwise, 10 point ROS done and otherwise negative.    PHYSICAL EXAM:  T(C): 36.8 (03-03-23 @ 13:54), Max: 36.8 (03-03-23 @ 13:54)  HR: 139 (03-03-23 @ 15:20) (139 - 142)  BP: 97/69 (03-03-23 @ 15:20) (97/69 - 120/82)  RR: 23 (03-03-23 @ 15:20) (23 - 24)  SpO2: 97% (03-03-23 @ 15:20) (97% - 98%)  Wt(kg): --  I&O's Summary      Appearance: disoriented   HEENT:   Normal oral mucosa, PERRL, EOMI	  Lymphatic: No lymphadenopathy  Cardiovascular: Normal S1 S2, No JVD, No murmurs, No edema  Respiratory: Crackles to LML and LLL on auscultation  Psychiatry: A & O x 1 (disoriented to time, place)  Gastrointestinal:  Soft, Non-tender, + BS	  Skin: No rashes, No ecchymoses, No cyanosis	  Extremities: Normal range of motion, No clubbing, cyanosis or edema  Vascular: Peripheral pulses palpable 2+ bilaterally        LABS:	 	    CBC Full  -  ( 03 Mar 2023 15:09 )  WBC Count : 6.46 K/uL  Hemoglobin : 13.3 g/dL  Hematocrit : 39.8 %  Platelet Count - Automated : 136 K/uL  Mean Cell Volume : 100.3 fl  Mean Cell Hemoglobin : 33.5 pg  Mean Cell Hemoglobin Concentration : 33.4 gm/dL  Auto Neutrophil # : x  Auto Lymphocyte # : x  Auto Monocyte # : x  Auto Eosinophil # : x  Auto Basophil # : x  Auto Neutrophil % : x  Auto Lymphocyte % : x  Auto Monocyte % : x  Auto Eosinophil % : x  Auto Basophil % : x    03-03    133<L>  |  98  |  44<H>  ----------------------------<  544<HH>  4.9   |  20<L>  |  2.04<H>    Ca    9.1      03 Mar 2023 15:09  Mg     1.8     03-03    TPro  6.0  /  Alb  3.3  /  TBili  1.7<H>  /  DBili  x   /  AST  23  /  ALT  30  /  AlkPhos  66  03-03      proBNP: Serum Pro-Brain Natriuretic Peptide: 3349 pg/mL (03-03 @ 15:09)    TELEMETRY: Atrial Flutter 140's	    ECG: Atrial flutter   	    PREVIOUS DIAGNOSTIC TESTING:    [X] Echocardiogram: pending

## 2023-03-03 NOTE — CONSULT NOTE ADULT - PROBLEM SELECTOR RECOMMENDATION 4
- hx of hemolytic anemia, followed by jarred as outpatient   - would reconsult heme during this admission  - blood work from today pending    - on last admission it was stated by jarred to avoid blood transfusion unless he is significantly symptomatic due to risk of hyperhemolysis. Of note if a transfusion is needed it is ok from heart transplant perspective

## 2023-03-03 NOTE — ED ADULT NURSE NOTE - NSIMPLEMENTINTERV_GEN_ALL_ED
Implemented All Fall Risk Interventions:  [place] to call system. Call bell, personal items and telephone within reach. Instruct patient to call for assistance. Room bathroom lighting operational. Non-slip footwear when patient is off stretcher. Physically safe environment: no spills, clutter or unnecessary equipment. Stretcher in lowest position, wheels locked, appropriate side rails in place. Provide visual cue, wrist band, yellow gown, etc. Monitor gait and stability. Monitor for mental status changes and reorient to person, place, and time. Review medications for side effects contributing to fall risk. Reinforce activity limits and safety measures with patient and family.

## 2023-03-03 NOTE — ED PROVIDER NOTE - ATTENDING CONTRIBUTION TO CARE
This is a 71 year old male with PMH of HTN, HLD, NICM, chronic systolic heart failure s/p HM2 LVAD (6/2017) s/p heart transplant from Hep. C donor (treated) 2/23/18 (post op course complicated by graft dysfunction treated by plasmapheresis, IVIG, and rituximab) Presenting today with chest tightness since last night which has been constant no associated shortness of breath or palpitations or lightheadedness or syncope.  Patient is well-appearing with stable blood pressure but noted to be tachycardic at 140 with narrow complex noted P waves were seen transplant attending was called as well as a PA and cards fellow requesting EP consult as well cardiac work-up was ordered. Patient was here several weeks ago at [facility] with patent coronaries This is a 71 year old male with PMH of HTN, HLD, NICM, chronic systolic heart failure s/p HM2 LVAD (6/2017) s/p heart transplant from Hep. C donor (treated) 2/23/18 (post op course complicated by graft dysfunction treated by plasmapheresis, IVIG, and rituximab) Presenting today with chest tightness since last night which has been constant no associated shortness of breath or palpitations or lightheadedness or syncope.  Patient is well-appearing with stable blood pressure but noted to be tachycardic at 140 with narrow complex noted P waves were seen transplant attending was called as well as a PA and cards fellow requesting EP consult as well cardiac work-up was ordered. Patient was here several weeks ago and had a cath with patent coronaries.

## 2023-03-03 NOTE — CONSULT NOTE ADULT - PROBLEM SELECTOR RECOMMENDATION 9
- present to [facility] ER with rate up to 140s  - appear to be ST on EKG  - EP consult appreciated  - ok to give Metoprolol 5 mg IV x 1  - continue BB as stared below  - plan to obtain ECHO today

## 2023-03-03 NOTE — ED PROVIDER NOTE - PHYSICAL EXAMINATION
Const: Well-nourished, Well-developed, appearing stated age.  Eyes: no conjunctival injection, and symmetrical lids.  HEENT: Head NCAT, no lesions. Atraumatic external nose and ears. Moist MM.  Neck: Symmetric, trachea midline.   RESP: Unlabored respiratory effort.    GI: Nontender/Nondistended, No CVA tenderness b/l.   MSK: Normocephalic/Atraumatic, Lower Extremities w/o calf tenderness or edema b/l.   Skin: Warm, dry and intact.   Neuro: CNs II-XII grossly intact. Motor & Sensation grossly intact.  Psych: Awake, Alert, & Oriented (AAO) x3. Appropriate mood and affect.

## 2023-03-03 NOTE — CONSULT NOTE ADULT - ASSESSMENT
71 y/o male with hx of nonischemic cardiomyopathy s/p HM2 LVAD in June 2017 complicated by recurrent GI hemorrhage and possible pump thrombosis who underwent OHT 2/23/18 with hepatitis C donor, hx of hemolytic anemia (treated with prednisone and Rituximab) presented to [facility] ER with new onset of chest tightness. Upon arrival was found to be have a heart rate sitting in the 140s. Will be give IV lopressor and EP was consulted. Plan to admit for further observation.

## 2023-03-03 NOTE — CONSULT NOTE ADULT - PROBLEM SELECTOR RECOMMENDATION 3
- Continue home tacro for goal 6-8  - continue home Prednisone 75 mg PO QD (dose per heme)  - Not on cellcept ? - Continue home tacro for goal 6-8  - continue home Prednisone 75 mg PO QD (dose per heme)  off cellcept due to recurrent infections

## 2023-03-03 NOTE — CONSULT NOTE ADULT - SUBJECTIVE AND OBJECTIVE BOX
ADVANCED HEART FAILURE & TRANSPLANT- CONSULT NOTE  *To reach the NS1 Team from 8am to 5pm, please call [phone].  ___________________________________________________________________________    HPI      PAST MEDICAL & SURGICAL HISTORY:  HTN      SVT (Supraventricular Tachycardia)      Non-Ischemic Cardiomyopathy  now s/p transplant 2018      GIB (gastrointestinal bleeding)      Hepatitis C virus      DVT of upper extremity (deep vein thrombosis)      Former smoker      HLD (hyperlipidemia)      Knee pain, right      H/O autoimmune hemolytic anemia      H/O hemolytic anemia      Status post left hip replacement      H/O heart transplant  2/2018      FAMILY HISTORY:          Allergies    No Known Allergies    Intolerances          Home Medications:  aspirin 81 mg oral tablet:  (17 Feb 2023 11:27)  folic acid 1 mg oral tablet: 1 tab(s) orally once a day (17 Feb 2023 11:27)  losartan 100 mg oral tablet: 1 tab(s) orally once a day (17 Feb 2023 11:27)  metoprolol succinate 200 mg oral tablet, extended release: 1 tab(s) orally once a day (17 Feb 2023 11:27)  pantoprazole 40 mg oral delayed release tablet: 1 tab(s) orally once a day (17 Feb 2023 11:27)  polyethylene glycol 3350 oral powder for reconstitution: 17 gram(s) orally once a day, As needed, Constipation (17 Feb 2023 11:27)  pravastatin 20 mg oral tablet: 1 tab(s) orally once a day (17 Feb 2023 11:27)        Medications:  aspirin  chewable 162 milliGRAM(s) Oral once  metoprolol tartrate Injectable 5 milliGRAM(s) IV Push Once      Vitals:  Vital Signs Last 24 Hours  T(C): 36.8 (03-03-23 @ 13:54), Max: 36.8 (03-03-23 @ 13:54)  HR: 139 (03-03-23 @ 15:20) (139 - 142)  BP: 97/69 (03-03-23 @ 15:20) (97/69 - 120/82)  RR: 23 (03-03-23 @ 15:20) (23 - 24)  SpO2: 97% (03-03-23 @ 15:20) (97% - 98%)        I&O's Summary      Physical Exam  General: No distress. Comfortable.  Neck: JVP not appreciated  Chest: Clear to auscultation bilaterally  CV: Normal S1 and S2. No murmurs, rub, or gallops. Radial pulses normal.  Abdomen: Soft, non-distended, non-tender  Extremities: warm and well perfused  Neurology: Alert and oriented times three.     Labs:  Pending             Imaging Studies   Pending  ADVANCED HEART FAILURE & TRANSPLANT- CONSULT NOTE  *To reach the NS1 Team from 8am to 5pm, please call [phone].  ___________________________________________________________________________    HPI  72 year old male with PMH of HTN, HLD, NICM, chronic systolic heart failure s/p HM2 LVAD (6/2017) s/p heart transplant from Hep. C donor (treated) 2/23/18 (post op course complicated by graft dysfunction treated by plasmapheresis, IVIG, and rituximab) Presenting today with chest tightness since last night which has been constant no associated shortness of breath or palpitations or lightheadedness or syncope.  Patient is well-appearing with stable blood pressure but noted to be tachycardic at 140 with narrow complex noted P waves were seen transplant attending was called as well as a PA and cards fellow requesting EP consult as well cardiac work-up was ordered. Patient was here several weeks ago and had a cath with patent coronaries.          PAST MEDICAL & SURGICAL HISTORY:  HTN      SVT (Supraventricular Tachycardia)      Non-Ischemic Cardiomyopathy  now s/p transplant 2018      GIB (gastrointestinal bleeding)      Hepatitis C virus      DVT of upper extremity (deep vein thrombosis)      Former smoker      HLD (hyperlipidemia)      Knee pain, right      H/O autoimmune hemolytic anemia      H/O hemolytic anemia      Status post left hip replacement      H/O heart transplant  2/2018      FAMILY HISTORY:          Allergies    No Known Allergies    Intolerances          Home Medications:  aspirin 81 mg oral tablet:  (17 Feb 2023 11:27)  folic acid 1 mg oral tablet: 1 tab(s) orally once a day (17 Feb 2023 11:27)  losartan 100 mg oral tablet: 1 tab(s) orally once a day (17 Feb 2023 11:27)  metoprolol succinate 200 mg oral tablet, extended release: 1 tab(s) orally once a day (17 Feb 2023 11:27)  pantoprazole 40 mg oral delayed release tablet: 1 tab(s) orally once a day (17 Feb 2023 11:27)  polyethylene glycol 3350 oral powder for reconstitution: 17 gram(s) orally once a day, As needed, Constipation (17 Feb 2023 11:27)  pravastatin 20 mg oral tablet: 1 tab(s) orally once a day (17 Feb 2023 11:27)        Medications:  aspirin  chewable 162 milliGRAM(s) Oral once  metoprolol tartrate Injectable 5 milliGRAM(s) IV Push Once      Vitals:  Vital Signs Last 24 Hours  T(C): 36.8 (03-03-23 @ 13:54), Max: 36.8 (03-03-23 @ 13:54)  HR: 139 (03-03-23 @ 15:20) (139 - 142)  BP: 97/69 (03-03-23 @ 15:20) (97/69 - 120/82)  RR: 23 (03-03-23 @ 15:20) (23 - 24)  SpO2: 97% (03-03-23 @ 15:20) (97% - 98%)        I&O's Summary      Physical Exam  General: No distress. Comfortable.  Neck: JVP not appreciated  Chest: Clear to auscultation bilaterally  CV: Normal S1 and S2. No murmurs, rub, or gallops. Radial pulses normal.  Abdomen: Soft, non-distended, non-tender  Extremities: warm and well perfused  Neurology: Alert and oriented times three.     Labs:  Pending             Imaging Studies   Pending

## 2023-03-03 NOTE — CONSULT NOTE ADULT - NS ATTEND AMEND GEN_ALL_CORE FT
IV dilt for rate control  May benefit from ablation
72 year old man with past medical history of a nonischemic cardiomyopathy and chronic systolic heart failure s/p HM2 LVAD in June 2017 complicated by recurrent GI hemorrhage and possible pump thrombosis who underwent heart transplant on 2/23/18 with a hepatitis C positive donor. His post-operative course was complicated by graft dysfunction of unclear etiology with recovered LV function. He also has persistent C4d positive staining on endomyocardial biopsy with negative DSAs. 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 He recently had a LHC which showed now significant disease. He states that for the last 1 day he has been feeling chest tightness and feeling unwell. He was noted to be in atrial tachycardia/atrial flutter on EKG at the rates of 140s  Would obtain a full set of labs  WIll likely be admitted  Obtain an EP consult  Obtain an echo  Continue with home tacrolimus of 5mg BID  Continue with prednisone 75mg daily for his hemolytic anemia. He is currently on Opportunistic infection prevention with valcyte, bactime

## 2023-03-03 NOTE — CONSULT NOTE ADULT - ASSESSMENT
73 y/o male with hx of nonischemic cardiomyopathy s/p HM2 LVAD in June 2017 complicated by recurrent GI hemorrhage and possible pump thrombosis who underwent OHT 2/23/18 with hepatitis C donor, hx of hemolytic anemia (treated with prednisone and Rituximab) presented to [facility] ER with new onset of chest tightness. Patient is s/p RHC several weeks ago and had a cath with patent coronaries. Was found to be in A flutter 140 bpm.     1. new onset Atrial flutter   2. s/p heart transplant 2018  3. hemolytic anemia    - Patient undergoing echo  - continue metoprolol succinate 200mg qd  - Start Diltiazem gtt for rate control   - EP recommending KALEIGH/DCCV Monday, 3/6/23      Rasheeda Soria. [facility]  [phone] 73 y/o male with hx of nonischemic cardiomyopathy s/p HM2 LVAD in June 2017 complicated by recurrent GI hemorrhage and possible pump thrombosis who underwent OHT 2/23/18 with hepatitis C donor, hx of hemolytic anemia (treated with prednisone and Rituximab) presented to [facility] ER with new onset of chest tightness. Patient is s/p RHC several weeks ago and had a cath with patent coronaries. Was found to be in A flutter 140 bpm.     1. new onset Atrial flutter, patient is a poor historian A&Ox1 at this time   2. s/p heart transplant 2018  3. history of hemolytic anemia    - Patient currently undergoing transthoracic echo  - continue metoprolol succinate 200mg qd  - Start Diltiazem gtt for heart rate control   - Recommend KALEIGH/DCCV Monday, 3/6/23  - [id number] VAsc score 2, recommend anticoagulation Eliquis 5mg PO BID if no contraindication.  Patient is currently awaiting CT Head for new disorientation and has a history of GIB, hemolytic anemia. H/H within normal limits today.       Rasheeda Soria. [facility]  [phone]

## 2023-03-04 DIAGNOSIS — R73.9 HYPERGLYCEMIA, UNSPECIFIED: ICD-10-CM

## 2023-03-04 DIAGNOSIS — Z29.9 ENCOUNTER FOR PROPHYLACTIC MEASURES, UNSPECIFIED: ICD-10-CM

## 2023-03-04 DIAGNOSIS — E78.5 HYPERLIPIDEMIA, UNSPECIFIED: ICD-10-CM

## 2023-03-04 DIAGNOSIS — N17.9 ACUTE KIDNEY FAILURE, UNSPECIFIED: ICD-10-CM

## 2023-03-04 DIAGNOSIS — R07.89 OTHER CHEST PAIN: ICD-10-CM

## 2023-03-04 DIAGNOSIS — G93.40 ENCEPHALOPATHY, UNSPECIFIED: ICD-10-CM

## 2023-03-04 DIAGNOSIS — M10.9 GOUT, UNSPECIFIED: ICD-10-CM

## 2023-03-04 DIAGNOSIS — J98.11 ATELECTASIS: ICD-10-CM

## 2023-03-04 DIAGNOSIS — A41.9 SEPSIS, UNSPECIFIED ORGANISM: ICD-10-CM

## 2023-03-04 DIAGNOSIS — I10 ESSENTIAL (PRIMARY) HYPERTENSION: ICD-10-CM

## 2023-03-04 DIAGNOSIS — D59.10 AUTOIMMUNE HEMOLYTIC ANEMIA, UNSPECIFIED: ICD-10-CM

## 2023-03-04 DIAGNOSIS — I48.92 UNSPECIFIED ATRIAL FLUTTER: ICD-10-CM

## 2023-03-04 LAB
ALBUMIN SERPL ELPH-MCNC: 2.4 G/DL — LOW (ref 3.3–5)
ALP SERPL-CCNC: 53 U/L — SIGNIFICANT CHANGE UP (ref 40–120)
ALT FLD-CCNC: 24 U/L — SIGNIFICANT CHANGE UP (ref 10–45)
ANION GAP SERPL CALC-SCNC: 14 MMOL/L — SIGNIFICANT CHANGE UP (ref 5–17)
APPEARANCE UR: ABNORMAL
AST SERPL-CCNC: 32 U/L — SIGNIFICANT CHANGE UP (ref 10–40)
BACTERIA # UR AUTO: NEGATIVE — SIGNIFICANT CHANGE UP
BASE EXCESS BLDV CALC-SCNC: -3.4 MMOL/L — LOW (ref -2–3)
BASOPHILS # BLD AUTO: 0.01 K/UL — SIGNIFICANT CHANGE UP (ref 0–0.2)
BASOPHILS NFR BLD AUTO: 0.2 % — SIGNIFICANT CHANGE UP (ref 0–2)
BILIRUB SERPL-MCNC: 1.3 MG/DL — HIGH (ref 0.2–1.2)
BILIRUB UR-MCNC: NEGATIVE — SIGNIFICANT CHANGE UP
BLD GP AB SCN SERPL QL: POSITIVE — SIGNIFICANT CHANGE UP
BUN SERPL-MCNC: 40 MG/DL — HIGH (ref 7–23)
CA-I SERPL-SCNC: 1.23 MMOL/L — SIGNIFICANT CHANGE UP (ref 1.15–1.33)
CALCIUM SERPL-MCNC: 7.7 MG/DL — LOW (ref 8.4–10.5)
CHLORIDE BLDV-SCNC: 102 MMOL/L — SIGNIFICANT CHANGE UP (ref 96–108)
CHLORIDE SERPL-SCNC: 103 MMOL/L — SIGNIFICANT CHANGE UP (ref 96–108)
CO2 BLDV-SCNC: 22 MMOL/L — SIGNIFICANT CHANGE UP (ref 22–26)
CO2 SERPL-SCNC: 19 MMOL/L — LOW (ref 22–31)
COLOR SPEC: YELLOW — SIGNIFICANT CHANGE UP
CREAT SERPL-MCNC: 1.85 MG/DL — HIGH (ref 0.5–1.3)
CRYPTOC AG FLD QL: NEGATIVE — SIGNIFICANT CHANGE UP
DAT C3-SP REAG RBC QL: NEGATIVE — SIGNIFICANT CHANGE UP
DIFF PNL FLD: ABNORMAL
EBV EA AB SER IA-ACNC: <5 U/ML — SIGNIFICANT CHANGE UP
EBV EA AB TITR SER IF: POSITIVE
EBV EA IGG SER-ACNC: NEGATIVE — SIGNIFICANT CHANGE UP
EBV NA IGG SER IA-ACNC: 424 U/ML — HIGH
EBV PATRN SPEC IB-IMP: SIGNIFICANT CHANGE UP
EBV VCA IGG AVIDITY SER QL IA: POSITIVE
EBV VCA IGM SER IA-ACNC: 193 U/ML — HIGH
EBV VCA IGM SER IA-ACNC: <10 U/ML — SIGNIFICANT CHANGE UP
EBV VCA IGM TITR FLD: NEGATIVE — SIGNIFICANT CHANGE UP
EGFR: 38 ML/MIN/1.73M2 — LOW
EOSINOPHIL # BLD AUTO: 0.01 K/UL — SIGNIFICANT CHANGE UP (ref 0–0.5)
EOSINOPHIL NFR BLD AUTO: 0.2 % — SIGNIFICANT CHANGE UP (ref 0–6)
EPI CELLS # UR: 3 /HPF — SIGNIFICANT CHANGE UP
FOLATE SERPL-MCNC: >20 NG/ML — SIGNIFICANT CHANGE UP
GAS PNL BLDV: 133 MMOL/L — LOW (ref 136–145)
GAS PNL BLDV: SIGNIFICANT CHANGE UP
GLUCOSE BLDC GLUCOMTR-MCNC: 254 MG/DL — HIGH (ref 70–99)
GLUCOSE BLDC GLUCOMTR-MCNC: 264 MG/DL — HIGH (ref 70–99)
GLUCOSE BLDC GLUCOMTR-MCNC: 282 MG/DL — HIGH (ref 70–99)
GLUCOSE BLDC GLUCOMTR-MCNC: 292 MG/DL — HIGH (ref 70–99)
GLUCOSE BLDC GLUCOMTR-MCNC: 447 MG/DL — HIGH (ref 70–99)
GLUCOSE BLDV-MCNC: 257 MG/DL — HIGH (ref 70–99)
GLUCOSE SERPL-MCNC: 268 MG/DL — HIGH (ref 70–99)
GLUCOSE SERPL-MCNC: 438 MG/DL — HIGH (ref 70–99)
GLUCOSE UR QL: ABNORMAL
HAPTOGLOB SERPL-MCNC: 212 MG/DL — HIGH (ref 34–200)
HCO3 BLDV-SCNC: 21 MMOL/L — LOW (ref 22–29)
HCT VFR BLD CALC: 34.2 % — LOW (ref 39–50)
HCT VFR BLDA CALC: 38 % — LOW (ref 39–51)
HGB BLD CALC-MCNC: 12.5 G/DL — LOW (ref 12.6–17.4)
HGB BLD-MCNC: 11.1 G/DL — LOW (ref 13–17)
HMPV RNA SPEC QL NAA+PROBE: DETECTED
HYALINE CASTS # UR AUTO: 1 /LPF — SIGNIFICANT CHANGE UP (ref 0–7)
IMM GRANULOCYTES NFR BLD AUTO: 1 % — HIGH (ref 0–0.9)
KETONES UR-MCNC: NEGATIVE — SIGNIFICANT CHANGE UP
LACTATE BLDV-MCNC: 1.4 MMOL/L — SIGNIFICANT CHANGE UP (ref 0.5–2)
LDH SERPL L TO P-CCNC: 350 U/L — HIGH (ref 50–242)
LEGIONELLA AG UR QL: NEGATIVE — SIGNIFICANT CHANGE UP
LEUKOCYTE ESTERASE UR-ACNC: NEGATIVE — SIGNIFICANT CHANGE UP
LYMPHOCYTES # BLD AUTO: 0.61 K/UL — LOW (ref 1–3.3)
LYMPHOCYTES # BLD AUTO: 9.8 % — LOW (ref 13–44)
MAGNESIUM SERPL-MCNC: 1.7 MG/DL — SIGNIFICANT CHANGE UP (ref 1.6–2.6)
MCHC RBC-ENTMCNC: 32.5 GM/DL — SIGNIFICANT CHANGE UP (ref 32–36)
MCHC RBC-ENTMCNC: 33.3 PG — SIGNIFICANT CHANGE UP (ref 27–34)
MCV RBC AUTO: 102.7 FL — HIGH (ref 80–100)
MONOCYTES # BLD AUTO: 0.22 K/UL — SIGNIFICANT CHANGE UP (ref 0–0.9)
MONOCYTES NFR BLD AUTO: 3.5 % — SIGNIFICANT CHANGE UP (ref 2–14)
MRSA PCR RESULT.: SIGNIFICANT CHANGE UP
NEUTROPHILS # BLD AUTO: 5.33 K/UL — SIGNIFICANT CHANGE UP (ref 1.8–7.4)
NEUTROPHILS NFR BLD AUTO: 85.3 % — HIGH (ref 43–77)
NITRITE UR-MCNC: NEGATIVE — SIGNIFICANT CHANGE UP
NRBC # BLD: 0 /100 WBCS — SIGNIFICANT CHANGE UP (ref 0–0)
PCO2 BLDV: 37 MMHG — LOW (ref 42–55)
PH BLDV: 7.37 — SIGNIFICANT CHANGE UP (ref 7.32–7.43)
PH UR: 5.5 — SIGNIFICANT CHANGE UP (ref 5–8)
PLATELET # BLD AUTO: 109 K/UL — LOW (ref 150–400)
PO2 BLDV: 61 MMHG — HIGH (ref 25–45)
POTASSIUM BLDV-SCNC: 4.7 MMOL/L — SIGNIFICANT CHANGE UP (ref 3.5–5.1)
POTASSIUM SERPL-MCNC: 4.8 MMOL/L — SIGNIFICANT CHANGE UP (ref 3.5–5.3)
POTASSIUM SERPL-SCNC: 4.8 MMOL/L — SIGNIFICANT CHANGE UP (ref 3.5–5.3)
PROCALCITONIN SERPL-MCNC: 2.39 NG/ML — HIGH (ref 0.02–0.1)
PROT SERPL-MCNC: 5.1 G/DL — LOW (ref 6–8.3)
PROT UR-MCNC: ABNORMAL
RAPID RVP RESULT: DETECTED
RBC # BLD: 3.33 M/UL — LOW (ref 4.2–5.8)
RBC # BLD: 3.68 M/UL — LOW (ref 4.2–5.8)
RBC # FLD: 16.2 % — HIGH (ref 10.3–14.5)
RBC CASTS # UR COMP ASSIST: 1 /HPF — SIGNIFICANT CHANGE UP (ref 0–4)
RETICS #: 77.6 K/UL — SIGNIFICANT CHANGE UP (ref 25–125)
RETICS/RBC NFR: 2.1 % — SIGNIFICANT CHANGE UP (ref 0.5–2.5)
RH IG SCN BLD-IMP: POSITIVE — SIGNIFICANT CHANGE UP
S AUREUS DNA NOSE QL NAA+PROBE: SIGNIFICANT CHANGE UP
SAO2 % BLDV: 91.4 % — HIGH (ref 67–88)
SARS-COV-2 RNA SPEC QL NAA+PROBE: SIGNIFICANT CHANGE UP
SODIUM SERPL-SCNC: 136 MMOL/L — SIGNIFICANT CHANGE UP (ref 135–145)
SP GR SPEC: 1.03 — HIGH (ref 1.01–1.02)
TACROLIMUS SERPL-MCNC: 0.9 NG/ML — SIGNIFICANT CHANGE UP
TSH SERPL-MCNC: 1.06 UIU/ML — SIGNIFICANT CHANGE UP (ref 0.27–4.2)
UROBILINOGEN FLD QL: NEGATIVE — SIGNIFICANT CHANGE UP
VIT B12 SERPL-MCNC: 1906 PG/ML — HIGH (ref 232–1245)
WBC # BLD: 6.24 K/UL — SIGNIFICANT CHANGE UP (ref 3.8–10.5)
WBC # FLD AUTO: 6.24 K/UL — SIGNIFICANT CHANGE UP (ref 3.8–10.5)
WBC UR QL: 4 /HPF — SIGNIFICANT CHANGE UP (ref 0–5)

## 2023-03-04 PROCEDURE — 99223 1ST HOSP IP/OBS HIGH 75: CPT

## 2023-03-04 PROCEDURE — 99222 1ST HOSP IP/OBS MODERATE 55: CPT | Mod: GC

## 2023-03-04 PROCEDURE — 12345: CPT | Mod: NC,GC

## 2023-03-04 PROCEDURE — 99232 SBSQ HOSP IP/OBS MODERATE 35: CPT

## 2023-03-04 PROCEDURE — 99223 1ST HOSP IP/OBS HIGH 75: CPT | Mod: GC

## 2023-03-04 RX ORDER — INSULIN HUMAN 100 [IU]/ML
5 INJECTION, SOLUTION SUBCUTANEOUS ONCE
Refills: 0 | Status: COMPLETED | OUTPATIENT
Start: 2023-03-04 | End: 2023-03-04

## 2023-03-04 RX ORDER — CEFEPIME 1 G/1
1000 INJECTION, POWDER, FOR SOLUTION INTRAMUSCULAR; INTRAVENOUS EVERY 12 HOURS
Refills: 0 | Status: DISCONTINUED | OUTPATIENT
Start: 2023-03-04 | End: 2023-03-04

## 2023-03-04 RX ORDER — METOPROLOL TARTRATE 50 MG
200 TABLET ORAL DAILY
Refills: 0 | Status: DISCONTINUED | OUTPATIENT
Start: 2023-03-04 | End: 2023-03-18

## 2023-03-04 RX ORDER — APIXABAN 2.5 MG/1
5 TABLET, FILM COATED ORAL EVERY 12 HOURS
Refills: 0 | Status: DISCONTINUED | OUTPATIENT
Start: 2023-03-04 | End: 2023-03-07

## 2023-03-04 RX ORDER — INSULIN GLARGINE 100 [IU]/ML
14 INJECTION, SOLUTION SUBCUTANEOUS AT BEDTIME
Refills: 0 | Status: DISCONTINUED | OUTPATIENT
Start: 2023-03-04 | End: 2023-03-05

## 2023-03-04 RX ORDER — MAGNESIUM SULFATE 500 MG/ML
2 VIAL (ML) INJECTION ONCE
Refills: 0 | Status: COMPLETED | OUTPATIENT
Start: 2023-03-04 | End: 2023-03-04

## 2023-03-04 RX ORDER — ACETAMINOPHEN 500 MG
650 TABLET ORAL EVERY 6 HOURS
Refills: 0 | Status: DISCONTINUED | OUTPATIENT
Start: 2023-03-04 | End: 2023-03-18

## 2023-03-04 RX ORDER — CEFEPIME 1 G/1
INJECTION, POWDER, FOR SOLUTION INTRAMUSCULAR; INTRAVENOUS
Refills: 0 | Status: DISCONTINUED | OUTPATIENT
Start: 2023-03-04 | End: 2023-03-04

## 2023-03-04 RX ORDER — GLUCAGON INJECTION, SOLUTION 0.5 MG/.1ML
1 INJECTION, SOLUTION SUBCUTANEOUS ONCE
Refills: 0 | Status: DISCONTINUED | OUTPATIENT
Start: 2023-03-04 | End: 2023-03-18

## 2023-03-04 RX ORDER — INSULIN LISPRO 100/ML
VIAL (ML) SUBCUTANEOUS
Refills: 0 | Status: DISCONTINUED | OUTPATIENT
Start: 2023-03-04 | End: 2023-03-04

## 2023-03-04 RX ORDER — INSULIN LISPRO 100/ML
VIAL (ML) SUBCUTANEOUS
Refills: 0 | Status: DISCONTINUED | OUTPATIENT
Start: 2023-03-04 | End: 2023-03-18

## 2023-03-04 RX ORDER — VALGANCICLOVIR 450 MG/1
450 TABLET, FILM COATED ORAL DAILY
Refills: 0 | Status: DISCONTINUED | OUTPATIENT
Start: 2023-03-04 | End: 2023-03-04

## 2023-03-04 RX ORDER — CEFEPIME 1 G/1
2000 INJECTION, POWDER, FOR SOLUTION INTRAMUSCULAR; INTRAVENOUS ONCE
Refills: 0 | Status: COMPLETED | OUTPATIENT
Start: 2023-03-04 | End: 2023-03-04

## 2023-03-04 RX ORDER — ATORVASTATIN CALCIUM 80 MG/1
10 TABLET, FILM COATED ORAL AT BEDTIME
Refills: 0 | Status: DISCONTINUED | OUTPATIENT
Start: 2023-03-04 | End: 2023-03-18

## 2023-03-04 RX ORDER — TACROLIMUS 5 MG/1
5 CAPSULE ORAL
Refills: 0 | Status: DISCONTINUED | OUTPATIENT
Start: 2023-03-04 | End: 2023-03-05

## 2023-03-04 RX ORDER — INSULIN LISPRO 100/ML
VIAL (ML) SUBCUTANEOUS AT BEDTIME
Refills: 0 | Status: DISCONTINUED | OUTPATIENT
Start: 2023-03-04 | End: 2023-03-05

## 2023-03-04 RX ORDER — DEXTROSE 50 % IN WATER 50 %
12.5 SYRINGE (ML) INTRAVENOUS ONCE
Refills: 0 | Status: DISCONTINUED | OUTPATIENT
Start: 2023-03-04 | End: 2023-03-18

## 2023-03-04 RX ORDER — INSULIN LISPRO 100/ML
5 VIAL (ML) SUBCUTANEOUS
Refills: 0 | Status: DISCONTINUED | OUTPATIENT
Start: 2023-03-04 | End: 2023-03-05

## 2023-03-04 RX ORDER — DEXTROSE 50 % IN WATER 50 %
15 SYRINGE (ML) INTRAVENOUS ONCE
Refills: 0 | Status: DISCONTINUED | OUTPATIENT
Start: 2023-03-04 | End: 2023-03-18

## 2023-03-04 RX ORDER — SODIUM CHLORIDE 9 MG/ML
1000 INJECTION, SOLUTION INTRAVENOUS
Refills: 0 | Status: DISCONTINUED | OUTPATIENT
Start: 2023-03-04 | End: 2023-03-18

## 2023-03-04 RX ORDER — FOLIC ACID 0.8 MG
1 TABLET ORAL DAILY
Refills: 0 | Status: DISCONTINUED | OUTPATIENT
Start: 2023-03-04 | End: 2023-03-18

## 2023-03-04 RX ORDER — VANCOMYCIN HCL 1 G
1000 VIAL (EA) INTRAVENOUS EVERY 24 HOURS
Refills: 0 | Status: DISCONTINUED | OUTPATIENT
Start: 2023-03-04 | End: 2023-03-05

## 2023-03-04 RX ORDER — VANCOMYCIN HCL 1 G
VIAL (EA) INTRAVENOUS
Refills: 0 | Status: DISCONTINUED | OUTPATIENT
Start: 2023-03-04 | End: 2023-03-04

## 2023-03-04 RX ORDER — DEXTROSE 50 % IN WATER 50 %
25 SYRINGE (ML) INTRAVENOUS ONCE
Refills: 0 | Status: DISCONTINUED | OUTPATIENT
Start: 2023-03-04 | End: 2023-03-18

## 2023-03-04 RX ORDER — PANTOPRAZOLE SODIUM 20 MG/1
40 TABLET, DELAYED RELEASE ORAL
Refills: 0 | Status: DISCONTINUED | OUTPATIENT
Start: 2023-03-04 | End: 2023-03-18

## 2023-03-04 RX ORDER — DILTIAZEM HCL 120 MG
5 CAPSULE, EXT RELEASE 24 HR ORAL
Qty: 125 | Refills: 0 | Status: DISCONTINUED | OUTPATIENT
Start: 2023-03-04 | End: 2023-03-04

## 2023-03-04 RX ORDER — VANCOMYCIN HCL 1 G
1000 VIAL (EA) INTRAVENOUS ONCE
Refills: 0 | Status: COMPLETED | OUTPATIENT
Start: 2023-03-04 | End: 2023-03-04

## 2023-03-04 RX ORDER — IPRATROPIUM/ALBUTEROL SULFATE 18-103MCG
3 AEROSOL WITH ADAPTER (GRAM) INHALATION EVERY 6 HOURS
Refills: 0 | Status: DISCONTINUED | OUTPATIENT
Start: 2023-03-04 | End: 2023-03-18

## 2023-03-04 RX ORDER — SODIUM CHLORIDE 9 MG/ML
4 INJECTION INTRAMUSCULAR; INTRAVENOUS; SUBCUTANEOUS EVERY 12 HOURS
Refills: 0 | Status: DISCONTINUED | OUTPATIENT
Start: 2023-03-04 | End: 2023-03-18

## 2023-03-04 RX ORDER — CEFEPIME 1 G/1
1000 INJECTION, POWDER, FOR SOLUTION INTRAMUSCULAR; INTRAVENOUS EVERY 12 HOURS
Refills: 0 | Status: DISCONTINUED | OUTPATIENT
Start: 2023-03-04 | End: 2023-03-05

## 2023-03-04 RX ORDER — LOSARTAN POTASSIUM 100 MG/1
100 TABLET, FILM COATED ORAL DAILY
Refills: 0 | Status: DISCONTINUED | OUTPATIENT
Start: 2023-03-04 | End: 2023-03-04

## 2023-03-04 RX ORDER — CEFEPIME 1 G/1
2000 INJECTION, POWDER, FOR SOLUTION INTRAMUSCULAR; INTRAVENOUS EVERY 12 HOURS
Refills: 0 | Status: DISCONTINUED | OUTPATIENT
Start: 2023-03-04 | End: 2023-03-04

## 2023-03-04 RX ORDER — LANOLIN ALCOHOL/MO/W.PET/CERES
3 CREAM (GRAM) TOPICAL AT BEDTIME
Refills: 0 | Status: DISCONTINUED | OUTPATIENT
Start: 2023-03-04 | End: 2023-03-18

## 2023-03-04 RX ORDER — TACROLIMUS 5 MG/1
5 CAPSULE ORAL
Refills: 0 | Status: DISCONTINUED | OUTPATIENT
Start: 2023-03-04 | End: 2023-03-04

## 2023-03-04 RX ORDER — INSULIN LISPRO 100/ML
VIAL (ML) SUBCUTANEOUS AT BEDTIME
Refills: 0 | Status: DISCONTINUED | OUTPATIENT
Start: 2023-03-04 | End: 2023-03-04

## 2023-03-04 RX ORDER — VALGANCICLOVIR 450 MG/1
450 TABLET, FILM COATED ORAL
Refills: 0 | Status: DISCONTINUED | OUTPATIENT
Start: 2023-03-04 | End: 2023-03-05

## 2023-03-04 RX ORDER — ASPIRIN/CALCIUM CARB/MAGNESIUM 324 MG
81 TABLET ORAL DAILY
Refills: 0 | Status: DISCONTINUED | OUTPATIENT
Start: 2023-03-04 | End: 2023-03-18

## 2023-03-04 RX ORDER — CALCIUM GLUCONATE 100 MG/ML
1 VIAL (ML) INTRAVENOUS ONCE
Refills: 0 | Status: COMPLETED | OUTPATIENT
Start: 2023-03-04 | End: 2023-03-04

## 2023-03-04 RX ADMIN — INSULIN HUMAN 5 UNIT(S): 100 INJECTION, SOLUTION SUBCUTANEOUS at 02:43

## 2023-03-04 RX ADMIN — Medication 3 MILLILITER(S): at 18:47

## 2023-03-04 RX ADMIN — Medication 1 MILLIGRAM(S): at 11:13

## 2023-03-04 RX ADMIN — Medication 3: at 11:14

## 2023-03-04 RX ADMIN — PANTOPRAZOLE SODIUM 40 MILLIGRAM(S): 20 TABLET, DELAYED RELEASE ORAL at 05:34

## 2023-03-04 RX ADMIN — Medication 1000 MILLIGRAM(S): at 05:47

## 2023-03-04 RX ADMIN — Medication 75 MILLIGRAM(S): at 05:33

## 2023-03-04 RX ADMIN — Medication 3: at 08:02

## 2023-03-04 RX ADMIN — INSULIN GLARGINE 14 UNIT(S): 100 INJECTION, SOLUTION SUBCUTANEOUS at 22:31

## 2023-03-04 RX ADMIN — Medication 200 MILLIGRAM(S): at 05:35

## 2023-03-04 RX ADMIN — Medication 100 GRAM(S): at 07:12

## 2023-03-04 RX ADMIN — TACROLIMUS 5 MILLIGRAM(S): 5 CAPSULE ORAL at 22:30

## 2023-03-04 RX ADMIN — VALGANCICLOVIR 450 MILLIGRAM(S): 450 TABLET, FILM COATED ORAL at 11:14

## 2023-03-04 RX ADMIN — SODIUM CHLORIDE 4 MILLILITER(S): 9 INJECTION INTRAMUSCULAR; INTRAVENOUS; SUBCUTANEOUS at 18:40

## 2023-03-04 RX ADMIN — Medication 250 MILLIGRAM(S): at 19:56

## 2023-03-04 RX ADMIN — APIXABAN 5 MILLIGRAM(S): 2.5 TABLET, FILM COATED ORAL at 05:34

## 2023-03-04 RX ADMIN — TACROLIMUS 5 MILLIGRAM(S): 5 CAPSULE ORAL at 05:34

## 2023-03-04 RX ADMIN — Medication 5 UNIT(S): at 18:38

## 2023-03-04 RX ADMIN — Medication 2: at 22:34

## 2023-03-04 RX ADMIN — Medication 81 MILLIGRAM(S): at 11:13

## 2023-03-04 RX ADMIN — VALGANCICLOVIR 450 MILLIGRAM(S): 450 TABLET, FILM COATED ORAL at 22:31

## 2023-03-04 RX ADMIN — ATORVASTATIN CALCIUM 10 MILLIGRAM(S): 80 TABLET, FILM COATED ORAL at 22:33

## 2023-03-04 RX ADMIN — Medication 3 MILLILITER(S): at 23:36

## 2023-03-04 RX ADMIN — APIXABAN 5 MILLIGRAM(S): 2.5 TABLET, FILM COATED ORAL at 18:40

## 2023-03-04 RX ADMIN — Medication 200 MILLIGRAM(S): at 23:36

## 2023-03-04 RX ADMIN — CEFEPIME 100 MILLIGRAM(S): 1 INJECTION, POWDER, FOR SOLUTION INTRAMUSCULAR; INTRAVENOUS at 05:33

## 2023-03-04 RX ADMIN — Medication 200 MILLIGRAM(S): at 18:39

## 2023-03-04 RX ADMIN — Medication 25 GRAM(S): at 12:53

## 2023-03-04 RX ADMIN — CEFEPIME 100 MILLIGRAM(S): 1 INJECTION, POWDER, FOR SOLUTION INTRAMUSCULAR; INTRAVENOUS at 19:57

## 2023-03-04 RX ADMIN — Medication 1 TABLET(S): at 22:32

## 2023-03-04 NOTE — H&P ADULT - ASSESSMENT
71 y/o male with hx of nonischemic cardiomyopathy s/p HM2 LVAD, underwent OHT 2/23/18 with hepatitis C donor, hx of hemolytic anemia presented w/ 1d chest tightness, found to have new onset aflutter w/ RVR, possible PNA w/ course c/b encephalopathy.  71 y/o male with hx of nonischemic cardiomyopathy s/p HM2 LVAD, underwent OHT 2/23/18 with hepatitis C donor, hx of hemolytic anemia presented w/ 1d chest tightness, found to have new onset aflutter w/ RVR,  PNA w/ course c/b encephalopathy.

## 2023-03-04 NOTE — CONSULT NOTE ADULT - CONSULT REQUESTED DATE/TIME
04-Mar-2023
04-Mar-2023 13:19
04-Mar-2023 10:24
03-Mar-2023 16:00
04-Mar-2023 10:30
03-Mar-2023 15:24

## 2023-03-04 NOTE — PROGRESS NOTE ADULT - PROBLEM SELECTOR PLAN 2
Admitted w/ HR 140s, improved after giving home dose 200mg metoprolol succinate. EP consulted and determined that patient has new-onset A-flutter. TTE done, showed EF 50-55%, concentric LV remodeling, grossly normal LV and RV systolic function. Possible compensatory response 2/2 underlying infection as above.   - EP recs appreciated  - Dilt gtt to maintain HR goal 120  - continue home metoprolol succinate 200mg qd  - CHADSVASC 2; start anticoagulation w/ eliquis 5mg BID Admitted w/ HR 140s, improved after giving home dose 200mg metoprolol succinate. EP consulted and determined that patient has new-onset A-flutter. TTE done, showed EF 50-55%, concentric LV remodeling, grossly normal LV and RV systolic function. Possible compensatory response 2/2 underlying infection as above.   Patient now in sinus tachycadia  - EP recs appreciated  - Dilt gtt to maintain HR goal 120   - continue home metoprolol succinate 200mg qd  - CHADSVASC 2; start anticoagulation w/ eliquis 5mg BID

## 2023-03-04 NOTE — H&P ADULT - PROBLEM SELECTOR PLAN 1
CHADSVASC 2; start anticoagulation w/ eliquis 5mg BID P/w chest tightness x1d associated w/ dry cough; likely multifactorial in s/o atrial flutter w/ HR 140s, notes that his chest tightness is improving as HR is lowered. Also considering contribution from CT chest findings of impacted R middle and lower lobe bronchi w/ near collapse of R middle/lower lobes.   - s/p Vanc/Zosyn in ED, 1L IVF  - f/u Bcx  - Procal elevated to 2.39; neutrophilic predominance on diff, patient is immunosuppressed, possibly would not mount significant immune response to infection  - continue abx:  - f/u urine legionella, CMV, cryptococcal antigen, fungitel, MRSA PCR P/w chest tightness x1d associated w/ dry cough; likely multifactorial in s/o atrial flutter w/ HR 140s, notes that his chest tightness is improving as HR is lowered. Also considering contribution from CT chest findings of impacted R middle and lower lobe bronchi w/ near collapse of R middle/lower lobes.   - s/p Vanc/Zosyn in ED, 1L IVF  - f/u Bcx  - Procal elevated to 2.39; neutrophilic predominance on diff, patient is immunosuppressed, possibly would not mount significant immune response to infection  - continue abx:  - f/u urine legionella, CMV, cryptococcal antigen, fungitel, MRSA PCR, RVP  - treatment of aflutter as below P/w chest tightness x1d associated w/ dry cough; likely multifactorial in s/o atrial flutter w/ HR 140s, notes that his chest tightness is improving as HR is lowered. Also considering contribution from CT chest findings of impacted R middle and lower lobe bronchi w/ near collapse of R middle/lower lobes.   - s/p Vanc/Zosyn in ED, 1L IVF  - f/u Bcx  - Procal elevated to 2.39; neutrophilic predominance on diff, patient is immunosuppressed, possibly would not mount significant immune response to infection  - continue abx: Vanc 1g q12h (3/4 - ) and cefepime 1g q12h (3/4 - ) --> adjusted for CrCl 36  - f/u urine legionella, CMV, cryptococcal antigen, fungitel, MRSA PCR, RVP  - treatment of aflutter as below

## 2023-03-04 NOTE — H&P ADULT - PROBLEM SELECTOR PLAN 8
On febuxostat 40mg qd, followed by rheumatology Continue home tacrolimus 5mg qd; target level 6-8  - f/u tacro level  Continue prednisone 75mg qd  Prophylaxis:   - Bactrim --> f/u transplant ID re correct dosing  - Valganciclovir 450mg 2tabs daily  - Pantoprazole 40mg qd  Off cellcept due to leukopenia, recurrent infections

## 2023-03-04 NOTE — H&P ADULT - NSHPREVIEWOFSYSTEMS_GEN_ALL_CORE
REVIEW OF SYSTEMS:    CONSTITUTIONAL: No weakness, fevers or chills  EYES/ENT: No visual changes;  No vertigo or throat pain   NECK: No pain or stiffness  RESPIRATORY: No cough, wheezing, hemoptysis; No shortness of breath  CARDIOVASCULAR: No chest pain or palpitations  GASTROINTESTINAL: No abdominal or epigastric pain. No nausea, vomiting, or hematemesis; No diarrhea or constipation. No melena or hematochezia.  GENITOURINARY: No dysuria, frequency or hematuria  NEUROLOGICAL: No numbness or weakness  SKIN: No itching, rashes  ENDO: no heat, cold intolerance REVIEW OF SYSTEMS:    CONSTITUTIONAL: No weakness, fevers or chills  EYES/ENT: No visual changes;  No vertigo or throat pain   NECK: No pain or stiffness  RESPIRATORY: +cough, no wheezing, hemoptysis; No shortness of breath  CARDIOVASCULAR: +chest tightness, no palpitations  GASTROINTESTINAL: No abdominal or epigastric pain. No nausea, vomiting, or hematemesis; No diarrhea or constipation. No melena or hematochezia.  GENITOURINARY: No dysuria, frequency or hematuria  NEUROLOGICAL: No numbness or weakness  SKIN: No itching, rashes  ENDO: no heat, cold intolerance

## 2023-03-04 NOTE — H&P ADULT - HISTORY OF PRESENT ILLNESS
71 year old male with PMH of HTN, HLD, NICM, chronic systolic heart failure s/p HM2 LVAD (6/2017) s/p heart transplant from Hep. C donor (treated) 2/23/18 (post op course complicated by graft dysfunction treated by plasmapheresis, IVIG, and rituximab), presenting w/ chest tightness x1d.     Admitted 2/17 for routine LHC and endomyocardial biopsy; coronaries patent.     ED vitals: 120/82, , RR 24, afebrile  ED course: Gave home dose of Metoprolol succinate 200mg for tachycardia. EP consulted, transplant cards consulted.  71 year old male with PMH of HTN, HLD, NICM, chronic systolic heart failure s/p HM2 LVAD (6/2017) s/p heart transplant from Hep. C donor (treated) 2/23/18 (post op course complicated by graft dysfunction treated by plasmapheresis, IVIG, and rituximab), presenting w/ chest tightness x1d. Reports tightness started suddenly 1 day ago. Patient is a poor historian and is unable to elaborate much further. Denies associated symptoms - no SOB, palpitations, arm/jaw pain, nausea, vomiting, orthopnea, PND. Has a non-productive cough that he reports has been going on for a while. Denies fevers, congestion, constitutional symptoms, sick contacts. No new limits on exercise tolerance - at baseline is able to walk 2 blocks before becoming dyspneic.     Admitted 2/17 for routine LHC and endomyocardial biopsy; coronaries patent. Admitted Jan 2023 for Hgb 6.5 secondary to AIHA; treated w/ 4d IV dexamethasone.     ED vitals: 120/82, , RR 24, afebrile  ED course: Gave home dose of Metoprolol succinate 200mg for tachycardia. EP consulted, transplant cards consulted, recommended continuing metoprolol 200mg and starting diltiazem gtt. CT C/A/P done, given vanc/zosyn x1, Bcx sent.

## 2023-03-04 NOTE — H&P ADULT - PROBLEM SELECTOR PLAN 3
Continue home tacrolimus 5mg qd; target level 6-8  - f/u tacro level  Continue prednisone 75mg qd  Prophylaxis:   - Bactrim   - Valganciclovir 450mg 2tabs daily  - Pantoprazole 40mg qd  Off cellcept due to leukopenia, recurrent infections Patient AOx1 on admission, AOx3 by time of interview though slow to respond to questions, falling asleep intermittently during exam. Considering infection-mediated vs 2/2 hyperglycemia.  - CTH negative for acute pathology Patient AOx1 on admission, AOx3 by time of interview though slow to respond to questions, falling asleep intermittently during exam. Considering infection-mediated vs 2/2 hyperglycemia.  - CTH negative for acute pathology  - glycemic control as below; monitor for signs of DKA Meeting sepsis criteria w/ tachycardia to 140s, RR 24, oral temp 100.1 --> likely higher rectal temp, w/ PNA as possible source given symptoms, CT chest findings.   - treatment of PNA as above: vanc and cefepime

## 2023-03-04 NOTE — CONSULT NOTE ADULT - ASSESSMENT
71 year old male with PMH of HTN, HLD, NICM, chronic systolic heart failure s/p HM2 LVAD (6/2017) s/p heart transplant from Hep. C donor (treated) 2/23/18 (post op course complicated by graft dysfunction treated by plasmapheresis, IVIG, and rituximab), presenting w/ chest tightness x1d found to be HMPV positive with impacted bronchus intermedius however without hypoxemia, Pulm consulted for possible bronchoscopy. course complicated by new atrial flutter and hyperglycemia and NORMAN    Patient remains afebrile, tmax rectal once 100.1, procal 2.39, intermittently tachypneic, saturating well on RA  no leukocytosis but elevated bandemia, procal 2.39,  Ct chest Impacted bronchus intermedius with near complete collapse of the right middle and lower lobes, with slightly increased aeration as compared to 9/19/2020. Patchy areas of consolidation within   the right upper lobe and left lower lobe and lingula. Emphysema.  patient's acute symptoms likely in the setting of acute hmPV infection  as patient is on room air and bronchus intermedius impaction is chronic no emergent need for bronchoscopy  however given that impaction has not improved over several years concerning and may necessitate bronch as an outpatient when optimized to rule out any underlying lesion  continue broad spectrum abx  cover and send for opportunisitic infections  airway clearance: humidified oxygen, standing guaifenesin, duonebs q6h, hyper sal q6h, chest PT q6 h, aerobika/acapella q6 h,  titrate oxygen to O2 >88%, use humidified oxygen  incentive spirometry, OOB to chair, PT/OT  aspiration precautions as necessary  DVT ppx as tolerated  airway clearance as necessary   71 year old male with PMH of HTN, HLD, NICM, chronic systolic heart failure s/p HM2 LVAD (2017) s/p heart transplant from Hep. C donor (treated) 18 (post op course complicated by graft dysfunction treated by plasmapheresis, IVIG, and rituximab), presenting w/ chest tightness x1d found to be HMPV positive with impacted bronchus intermedius however without hypoxemia, Pulm consulted for possible bronchoscopy. course complicated by new atrial flutter and hyperglycemia and NORMAN    Patient remains afebrile, tmax rectal once 100.1, procal 2.39, intermittently tachypneic, saturating well on RA  no leukocytosis but elevated bandemia, procal 2.39,  Ct chest Impacted bronchus intermedius with near complete collapse of the right middle and lower lobes, with slightly increased aeration as compared to 2020. Patchy areas of consolidation within the right upper lobe and left lower lobe and lingula. Emphysema.    patient's acute symptoms likely in the setting of acute hmPV infection  as patient is on room air and bronchus intermedius impaction is chronic no emergent need for bronchoscopy  ensure sputum culture, HIV, urine legionella, urine streptococcus, nasal MRSA all sent  however given that impaction has not improved over several years concerning and may necessitate bronch as an outpatient when optimized to rule out any underlying lesion  continue broad spectrum abx as per ID  cover and send for opportunistic infections as per ID  airway clearance: humidified oxygen, standing guaifenesin, duonebs q6h, hyper sal q6h, chest PT q6 h, aerobika/acapella q6 h,  titrate oxygen to O2 >88%, use humidified oxygen  incentive spirometry, OOB to chair, PT/OT  aspiration precautions as necessary  DVT ppx as tolerated  airway clearance as necessary    Prior to discharge:  Please email: [email or web address] to setup an appointment prior to discharge. Include the patient's name, , MRN and contact information in the email.      [facility]  [address] [place] [address]  [phone]      please call back if patient has concerning change in clinical status

## 2023-03-04 NOTE — ED PROCEDURE NOTE - PROCEDURE ADDITIONAL DETAILS
Emergency Department Focused Ultrasound performed at patient's bedside for educational purposes. The study was performed by a credentialed ultrasound provider. -Reza Oshea PA-C

## 2023-03-04 NOTE — PROGRESS NOTE ADULT - PROBLEM SELECTOR PLAN 3
Meeting sepsis criteria w/ tachycardia to 140s, RR 24, oral temp 100.1 --> likely higher rectal temp, w/ PNA as possible source given symptoms, CT chest findings.   - treatment of PNA as above: vanc and cefepime

## 2023-03-04 NOTE — H&P ADULT - ATTENDING COMMENTS
72 y M PMH: HFrEF s/p LVAD + Heart Tx in 2018 PW: chest tightness for 1 day.     On arrival to ED VS: Tmax 100.1 rectal, HR: 139, BP: 97/69, saturating 97% on RA RR 23  Significant labs: Hb 13.3-->11.1 (macrocytic), Plt 136, Na 133, Bicarb 20, BUN 44, Scr 2.04, Glu 544 (no gap) , T bili 1.7, trop 80-->60, pro-BNP 3349,   Imaging: CTCAP: Impacted right middle and lower lobe bronchi with near complete collapse   of the right middle and lower lobes. Patchy areas of consolidation within the right upper lobe and left lower lobe.  No acute intra-abdominal abnormality. Nonspecific left perinephric stranding.  CT H: No acute intracranial hemorrhage or acute territorial infarction.  CXR personally reviewed: small R pleural effusion, no acute infiltrates  EKG personally reviewed: aflutter @ 140 bpm     Admitted for further management     #Aflutter  -EP recs appreciated, continue toprol, start IV dilt for rate control, Tele monitoring   -Plan for KALEIGH on Monday   -AC w Eliquis   -F/u TSH     #Severe sepsis  2/2 to PNA (consolidation seen on CT), UA negative   -Would continue vanc/zosyn, check MRSA swab. f/u micro studies, PCP PCR, sputum culture   -Tx ID consult in AM     #Toxic metabolic encephalopathy- AO x 1 on my exam  2/2 to severe sepsis vs hyperglycemia (takes steroids)   -See plan for sepsis and hyperglycemia   -Fall and aspiration precaution  -Check b12, TSH, folate    #H/o Heart Tx  -Continue taco and prednisone, f/u Tx recs in AM, check tacro level.     Resume home meds  Rest as above

## 2023-03-04 NOTE — PROGRESS NOTE ADULT - PROBLEM SELECTOR PLAN 5
Prednisone-induced hyperglycemia w/ blood glucose elevated to 400-500 on admission. Based on outpatient records, patient has been hyperglycemic in the past, last A1c 4.7; not currently on any JAVIER or insulin. Not given any insulin in ED.   - BHB wnl, anion gap wnl  - given 5u regular insulin;  at the time  - increase standing insulin based on 24h requirements  - started ISS, hypoglycemia protocol Prednisone-induced hyperglycemia w/ blood glucose elevated to 400-500 on admission. Based on outpatient records, patient has been hyperglycemic in the past, last A1c 4.7; not currently on any JAVIER or insulin. Not given any insulin in ED.   - BHB wnl, anion gap wnl  - given 5u regular insulin;  at the time  - increase standing insulin based on 24h requirements  - started ISS, hypoglycemia protocol  - endocrine consulted for hyperglycemia

## 2023-03-04 NOTE — PROGRESS NOTE ADULT - PROBLEM SELECTOR PLAN 6
Cr elevated to 2 on admission, down to 1.8 following 1L IVF. Considering sepsis mediated ATN vs prerenal NORMAN.   - monitor Cr, renally dose medications  - hold losartan  - urine lytes if Cr stagnant or worsening Cr elevated to 2 on admission, down to 1.8 following 1L IVF. Considering sepsis mediated ATN vs prerenal NORMAN.   Now at baseline  - monitor Cr, renally dose medications  - holding losartan; can restart as needed for bp control

## 2023-03-04 NOTE — CONSULT NOTE ADULT - CONSULT REASON
Atrial flutter
AIHA
Metapneumovirus URTI
mucus plug
diabetes
hx of heart transplant  heart rate to 140s

## 2023-03-04 NOTE — H&P ADULT - PROBLEM SELECTOR PLAN 4
Followed by [facility] as outpatient; admitted 6.5 in Jan 2023, treated w/ 4d IV dexamethasone.   Per hematology, recommended to avoid blood transfusion unless patient is symptomatic due to risk of hyperhemolysis. Transfusion is okay from heart transplant perspective. Prednisone-induced hyperglycemia w/ blood glucose elevated to 400-500 on admission. Based on outpatient records, patient has been hyperglycemic in the past, last A1c 4.7; not currently on any JAVIER or insulin. Not given any insulin in ED.   - BHB wnl  - given 5u regular insulin;  at the time  - increase standing insulin based on 24h requirements  - started ISS, hypoglycemia protocol Patient AOx1 on admission, AOx3 by time of interview though slow to respond to questions, falling asleep intermittently during exam. Considering infection-mediated vs 2/2 hyperglycemia.  - CTH negative for acute pathology  - glycemic control as below; monitor for signs of DKA

## 2023-03-04 NOTE — CONSULT NOTE ADULT - SUBJECTIVE AND OBJECTIVE BOX
Patient is a 72y old  Male who presents with a chief complaint of chest tightness (04 Mar 2023 07:41)    HPI: Mr. Baez is a 71 year old male with PMH of HTN, HLD, NICM, chronic systolic heart failure s/p HM2 LVAD (2017) s/p heart transplant from Hep. C donor (treated) 18 (post op course complicated by graft dysfunction treated by plasmapheresis, IVIG, and rituximab), presenting w/ chest tightness x1d. Reports tightness started suddenly 1 day ago. Pt has a non-productive cough that he reports has been going on for a while. Denies fevers, congestion, constitutional symptoms, sick contacts,  SOB, palpitations, arm/jaw pain, nausea, vomiting, orthopnea, PND. Pt was recently admitted 2023 for Hgb 6.5 secondary to AIHA; treated w/ 4d IV dexamethasone.     In ED pt was afebrile with no leukocytosis. Tamx of 100.1 with tachycardia to 142 s/p metoprolol and on dilt gtt. CT C/A/P showed Patchy areas of consolidation within  the right upper lobe and left lower lobe and lingula likely represents additional areasof atelectasis versus infection. RVP with metapneumo virus. Pt was given vanc/zosyn  and started on vanc/cefepime. ID consulted for the same.    REVIEW OF SYSTEMS  [  ] ROS unobtainable because:    [ x ] All other systems negative except as noted below    Constitutional:  [ ] fever [ ] chills  [ ] weight loss  [ ]night sweat  [ ]poor appetite/PO intake [ ]fatigue   Skin:  [ ] rash [ ] phlebitis	  Eyes: [ ] icterus [ ] pain  [ ] discharge	  ENMT: [ ] sore throat  [ ] thrush [ ] ulcers [ ] exudates [ ]anosmia  Respiratory: [ ] dyspnea [ ] hemoptysis [ ] cough [ ] sputum	  Cardiovascular:  [ ] chest pain [ ] palpitations [ ] edema	  Gastrointestinal:  [ ] nausea [ ] vomiting [ ] diarrhea [ ] constipation [ ] pain	  Genitourinary:  [ ] dysuria [ ] frequency [ ] hematuria [ ] discharge [ ] flank pain  [ ] incontinence  Musculoskeletal:  [ ] myalgias [ ] arthralgias [ ] arthritis  [ ] back pain  Neurological:  [ ] headache [ ] weakness [ ] seizures  [ ] confusion/altered mental status    prior hospital charts reviewed [V]  primary team notes reviewed [V]  other consultant notes reviewed [V]    PAST MEDICAL & SURGICAL HISTORY:  HTN  SVT (Supraventricular Tachycardia)  Non-Ischemic Cardiomyopathy now s/p transplant   GIB (gastrointestinal bleeding)  Hepatitis C virus  DVT of upper extremity (deep vein thrombosis)  Former smoker  HLD (hyperlipidemia)  Knee pain, right  H/O autoimmune hemolytic anemia  H/O hemolytic anemia  Status post left hip replacement  H/O heart transplant 2018    SOCIAL HISTORY:  - Denied smoking/vaping/alcohol/recreational drug use    FAMILY HISTORY:      Allergies  No Known Allergies        ANTIMICROBIALS:  cefepime   IVPB 2000 every 12 hours  cefepime   IVPB    valGANciclovir 450 daily  vancomycin  IVPB        ANTIMICROBIALS (past 90 days):  MEDICATIONS  (STANDING):  cefepime   IVPB   100 mL/Hr IV Intermittent (23 @ 05:33)    piperacillin/tazobactam IVPB...   200 mL/Hr IV Intermittent (23 @ 22:13)    vancomycin  IVPB   1000 milliGRAM(s) IV Intermittent (23 @ 05:47)    vancomycin  IVPB.   250 mL/Hr IV Intermittent (23 @ 18:31)        OTHER MEDS:   MEDICATIONS  (STANDING):  acetaminophen     Tablet .. 650 every 6 hours PRN  apixaban 5 every 12 hours  aspirin enteric coated 81 daily  atorvastatin 10 at bedtime  dextrose 50% Injectable 25 once  dextrose 50% Injectable 12.5 once  dextrose 50% Injectable 25 once  dextrose Oral Gel 15 once  glucagon  Injectable 1 once  insulin lispro (ADMELOG) corrective regimen sliding scale  three times a day before meals  insulin lispro (ADMELOG) corrective regimen sliding scale  at bedtime  melatonin 3 at bedtime PRN  metoprolol succinate  daily  pantoprazole    Tablet 40 before breakfast  predniSONE   Tablet 75 daily  tacrolimus 5 two times a day      VITALS:  Vital Signs Last 24 Hrs  T(F): 98.6 (23 @ 04:32), Max: 100.1 (23 @ 17:46)    Vital Signs Last 24 Hrs  HR: 110 (23 @ 06:45) (92 - 142)  BP: 107/77 (23 @ 04:32) (87/71 - 128/69)  RR: 17 (23 @ 04:32)  SpO2: 92% (23 @ 04:32) (92% - 98%)  Wt(kg): --    EXAM:    GA: NAD, AOx3  HEENT: oral cavity no lesion  CV: nl S1/S2, no RMG  Lungs: CTAB, No distress  Abd: BS+, soft, nontender, no rebounding pain  Ext: no edema  Neuro: No focal deficits  Skin: Intact  IV: no phlebitis    Labs:                        11.1   6.24  )-----------( 109      ( 04 Mar 2023 00:46 )             34.2         136  |  103  |  40<H>  ----------------------------<  438<H>  4.8   |  19<L>  |  1.85<H>    Ca    7.7<L>      04 Mar 2023 00:46  Mg     1.7         TPro  5.1<L>  /  Alb  2.4<L>  /  TBili  1.3<H>  /  DBili  x   /  AST  32  /  ALT  24  /  AlkPhos  53        WBC Trend:  WBC Count: 6.24 (23 @ 00:46)  WBC Count: 5.85 (23 @ 20:03)  WBC Count: 6.46 (23 @ 15:09)      Auto Neutrophil #: 5.33 K/uL (23 @ 00:46)  Auto Neutrophil #: 5.07 K/uL (23 @ 20:03)  Band Neutrophils %: 4.5 % (23 @ 20:03)  Auto Neutrophil #: 5.36 K/uL (23 @ 15:09)  Band Neutrophils %: 10.0 % (23 @ 15:09)      Creatine Trend:  Creatinine, Serum: 1.85 ()  Creatinine, Serum: 2.04 ()      Liver Biochemical Testing Trend:  Alanine Aminotransferase (ALT/SGPT): 24 ()  Alanine Aminotransferase (ALT/SGPT): 30 ()  Alanine Aminotransferase (ALT/SGPT): 17 ()  Alanine Aminotransferase (ALT/SGPT): 12 ()  Alanine Aminotransferase (ALT/SGPT): 10 ()  Aspartate Aminotransferase (AST/SGOT): 32 (23 @ 00:46)  Aspartate Aminotransferase (AST/SGOT): 23 (23 @ 15:09)  Aspartate Aminotransferase (AST/SGOT): 24 (23 @ 09:57)  Aspartate Aminotransferase (AST/SGOT): 28 (23 @ 07:21)  Aspartate Aminotransferase (AST/SGOT): 45 (23 @ 17:28)  Bilirubin Total, Serum: 1.3 ()  Bilirubin Total, Serum: 1.7 ()  Bilirubin Total, Serum: 2.4 ()  Bilirubin Total, Serum: 4.3 ()  Bilirubin Direct, Serum: 0.5 ()      Trend LDH  23 @ 07:17  863<H>  23 @ 08:52  854<H>  01-10-23 @ 08:21  717<H>  23 @ 07:23  605<H>  23 @ 07:33  631<H>      Auto Eosinophil %: 0.2 % (23 @ 00:46)  Auto Eosinophil %: 0.0 % (23 @ 20:03)  Auto Eosinophil %: 0.0 % (23 @ 15:09)      Urinalysis Basic - ( 03 Mar 2023 22:35 )    Color: Yellow / Appearance: Slightly Turbid / S.027 / pH: x  Gluc: x / Ketone: Negative  / Bili: Negative / Urobili: Negative   Blood: x / Protein: 30 mg/dL / Nitrite: Negative   Leuk Esterase: Negative / RBC: 1 /hpf / WBC 4 /HPF   Sq Epi: x / Non Sq Epi: 3 /hpf / Bacteria: Negative        MICROBIOLOGY:    RVP: Metapneumovirus  ( @ 06:33)  Procal: 2.39 ()  BNP: 3349 ()    RADIOLOGY:  imaging below personally reviewed    < from: CT Chest Abdomen and Pelvis No Cont (23 @ 21:21) >  Impacted bronchus intermedius with near complete collapse of the right middle and lower lobes, with slightly increased aeration as compared to 2020. Patchy areas of consolidation within  the right upper lobe and left lower lobe and lingula likely represents additional areasof atelectasis versus infection.  No acute intra-abdominal abnormality. Nonspecific left perinephric stranding, slightly increased from prior.   < end of copied text >    < from: CT Head No Cont (23 @ 21:19) >  No acute intracranial hemorrhage or acute territorial infarction.    < end of copied text >    < from: Xray Chest 1 View- PORTABLE-Urgent (23 @ 15:24) >  Right basilar atelectasis.  < end of copied text >   Patient is a 72y old  Male who presents with a chief complaint of chest tightness (04 Mar 2023 07:41)    HPI: Mr. Baez is a 71 year old male with PMH of HTN, HLD, NICM, chronic systolic heart failure s/p HM2 LVAD (2017) s/p heart transplant from Hep. C donor (treated) 18 (post op course complicated by graft dysfunction treated by plasmapheresis, IVIG, and rituximab), presenting w/ chest tightness x1d. Reports tightness started suddenly 1 day ago. Pt has a non-productive cough that he reports has been going on for a while. Denies fevers, congestion, constitutional symptoms, sick contacts,  SOB, palpitations, arm/jaw pain, nausea, vomiting, orthopnea, PND. Pt was recently admitted 2023 for Hgb 6.5 secondary to AIHA; treated w/ 4d IV dexamethasone.     In ED pt was afebrile with no leukocytosis. Tamx of 100.1 with aflutter to 142 s/p metoprolol and on dilt gtt. CT C/A/P showed Patchy areas of consolidation within  the right upper lobe and left lower lobe and lingula likely represents additional areasof atelectasis versus infection. RVP with metapneumo virus. Pt was given vanc/zosyn  and started on vanc/cefepime. ID consulted for the same. Pt reports resolution of chest tightness and back in sinus rhythm now. Still has some cough    REVIEW OF SYSTEMS  [  ] ROS unobtainable because:    [ x ] All other systems negative except as noted below    Constitutional:  [ ] fever [ ] chills  [ ] weight loss  [ ]night sweat  [ ]poor appetite/PO intake [ ]fatigue   Skin:  [ ] rash [ ] phlebitis	  Eyes: [ ] icterus [ ] pain  [ ] discharge	  ENMT: [ ] sore throat  [ ] thrush [ ] ulcers [ ] exudates [ ]anosmia  Respiratory: [ ] dyspnea [ ] hemoptysis [ ] cough [ ] sputum	  Cardiovascular:  [ ] chest pain [ ] palpitations [ ] edema	  Gastrointestinal:  [ ] nausea [ ] vomiting [ ] diarrhea [ ] constipation [ ] pain	  Genitourinary:  [ ] dysuria [ ] frequency [ ] hematuria [ ] discharge [ ] flank pain  [ ] incontinence  Musculoskeletal:  [ ] myalgias [ ] arthralgias [ ] arthritis  [ ] back pain  Neurological:  [ ] headache [ ] weakness [ ] seizures  [ ] confusion/altered mental status    prior hospital charts reviewed [V]  primary team notes reviewed [V]  other consultant notes reviewed [V]    PAST MEDICAL & SURGICAL HISTORY:  HTN  SVT (Supraventricular Tachycardia)  Non-Ischemic Cardiomyopathy now s/p transplant   GIB (gastrointestinal bleeding)  Hepatitis C virus  DVT of upper extremity (deep vein thrombosis)  Former smoker  HLD (hyperlipidemia)  Knee pain, right  H/O autoimmune hemolytic anemia  H/O hemolytic anemia  Status post left hip replacement  H/O heart transplant 2018    SOCIAL HISTORY:  - Denied smoking/vaping/alcohol/recreational drug use    FAMILY HISTORY:      Allergies  No Known Allergies        ANTIMICROBIALS:  cefepime   IVPB 2000 every 12 hours  cefepime   IVPB    valGANciclovir 450 daily  vancomycin  IVPB        ANTIMICROBIALS (past 90 days):  MEDICATIONS  (STANDING):  cefepime   IVPB   100 mL/Hr IV Intermittent (23 @ 05:33)    piperacillin/tazobactam IVPB...   200 mL/Hr IV Intermittent (23 @ 22:13)    vancomycin  IVPB   1000 milliGRAM(s) IV Intermittent (23 @ 05:47)    vancomycin  IVPB.   250 mL/Hr IV Intermittent (23 @ 18:31)        OTHER MEDS:   MEDICATIONS  (STANDING):  acetaminophen     Tablet .. 650 every 6 hours PRN  apixaban 5 every 12 hours  aspirin enteric coated 81 daily  atorvastatin 10 at bedtime  dextrose 50% Injectable 25 once  dextrose 50% Injectable 12.5 once  dextrose 50% Injectable 25 once  dextrose Oral Gel 15 once  glucagon  Injectable 1 once  insulin lispro (ADMELOG) corrective regimen sliding scale  three times a day before meals  insulin lispro (ADMELOG) corrective regimen sliding scale  at bedtime  melatonin 3 at bedtime PRN  metoprolol succinate  daily  pantoprazole    Tablet 40 before breakfast  predniSONE   Tablet 75 daily  tacrolimus 5 two times a day      VITALS:  Vital Signs Last 24 Hrs  T(F): 98.6 (23 @ 04:32), Max: 100.1 (23 @ 17:46)    Vital Signs Last 24 Hrs  HR: 110 (23 @ 06:45) (92 - 142)  BP: 107/77 (23 @ 04:32) (87/71 - 128/69)  RR: 17 (23 @ 04:32)  SpO2: 92% (23 @ 04:32) (92% - 98%)  Wt(kg): --    EXAM:    GA: NAD, AOx3  HEENT: oral cavity no lesion  CV: nl S1/S2, no RMG  Lungs: Decreased breath sounds in right side and crackles on left base  Abd: BS+, soft, nontender, no rebounding pain  Ext: no edema  Neuro: No focal deficits  Skin: Chronic tophi over fingers  IV: no phlebitis    Labs:                        11.1   6.24  )-----------( 109      ( 04 Mar 2023 00:46 )             34.2         136  |  103  |  40<H>  ----------------------------<  438<H>  4.8   |  19<L>  |  1.85<H>    Ca    7.7<L>      04 Mar 2023 00:46  Mg     1.7         TPro  5.1<L>  /  Alb  2.4<L>  /  TBili  1.3<H>  /  DBili  x   /  AST  32  /  ALT  24  /  AlkPhos  53        WBC Trend:  WBC Count: 6.24 (23 @ 00:46)  WBC Count: 5.85 (23 @ 20:03)  WBC Count: 6.46 (23 @ 15:09)      Auto Neutrophil #: 5.33 K/uL (23 @ 00:46)  Auto Neutrophil #: 5.07 K/uL (23 @ 20:03)  Band Neutrophils %: 4.5 % (23 @ 20:03)  Auto Neutrophil #: 5.36 K/uL (23 @ 15:09)  Band Neutrophils %: 10.0 % (23 @ 15:09)      Creatine Trend:  Creatinine, Serum: 1.85 ()  Creatinine, Serum: 2.04 ()      Liver Biochemical Testing Trend:  Alanine Aminotransferase (ALT/SGPT): 24 ()  Alanine Aminotransferase (ALT/SGPT): 30 ()  Alanine Aminotransferase (ALT/SGPT): 17 ()  Alanine Aminotransferase (ALT/SGPT): 12 ()  Alanine Aminotransferase (ALT/SGPT): 10 ()  Aspartate Aminotransferase (AST/SGOT): 32 (23 @ 00:46)  Aspartate Aminotransferase (AST/SGOT): 23 (23 @ 15:09)  Aspartate Aminotransferase (AST/SGOT): 24 (23 @ 09:57)  Aspartate Aminotransferase (AST/SGOT): 28 (23 @ 07:21)  Aspartate Aminotransferase (AST/SGOT): 45 (23 @ 17:28)  Bilirubin Total, Serum: 1.3 ()  Bilirubin Total, Serum: 1.7 ()  Bilirubin Total, Serum: 2.4 ()  Bilirubin Total, Serum: 4.3 ()  Bilirubin Direct, Serum: 0.5 ()      Trend LDH  23 @ 07:17  863<H>  23 @ 08:52  854<H>  01-10-23 @ 08:21  717<H>  23 @ 07:23  605<H>  23 @ 07:33  631<H>      Auto Eosinophil %: 0.2 % (23 @ 00:46)  Auto Eosinophil %: 0.0 % (23 @ 20:03)  Auto Eosinophil %: 0.0 % (23 @ 15:09)      Urinalysis Basic - ( 03 Mar 2023 22:35 )    Color: Yellow / Appearance: Slightly Turbid / S.027 / pH: x  Gluc: x / Ketone: Negative  / Bili: Negative / Urobili: Negative   Blood: x / Protein: 30 mg/dL / Nitrite: Negative   Leuk Esterase: Negative / RBC: 1 /hpf / WBC 4 /HPF   Sq Epi: x / Non Sq Epi: 3 /hpf / Bacteria: Negative        MICROBIOLOGY:    RVP: Metapneumovirus  ( @ 06:33)  Procal: 2.39 ()  BNP: 3349 ()    RADIOLOGY:  imaging below personally reviewed    < from: CT Chest Abdomen and Pelvis No Cont (23 @ 21:21) >  Impacted bronchus intermedius with near complete collapse of the right middle and lower lobes, with slightly increased aeration as compared to 2020. Patchy areas of consolidation within  the right upper lobe and left lower lobe and lingula likely represents additional areasof atelectasis versus infection.  No acute intra-abdominal abnormality. Nonspecific left perinephric stranding, slightly increased from prior.   < end of copied text >    < from: CT Head No Cont (23 @ 21:19) >  No acute intracranial hemorrhage or acute territorial infarction.    < end of copied text >    < from: Xray Chest 1 View- PORTABLE-Urgent (23 @ 15:24) >  Right basilar atelectasis.  < end of copied text >

## 2023-03-04 NOTE — PROGRESS NOTE ADULT - SUBJECTIVE AND OBJECTIVE BOX
24H hour events:       MEDICATIONS:  apixaban 5 milliGRAM(s) Oral every 12 hours  aspirin enteric coated 81 milliGRAM(s) Oral daily  metoprolol succinate  milliGRAM(s) Oral daily  cefepime   IVPB 1000 milliGRAM(s) IV Intermittent every 12 hours  trimethoprim   80 mG/sulfamethoxazole 400 mG 1 Tablet(s) Oral every 48 hours  valGANciclovir 450 milliGRAM(s) Oral every 48 hours  vancomycin  IVPB      acetaminophen     Tablet .. 650 milliGRAM(s) Oral every 6 hours PRN  melatonin 3 milliGRAM(s) Oral at bedtime PRN  pantoprazole    Tablet 40 milliGRAM(s) Oral before breakfast  atorvastatin 10 milliGRAM(s) Oral at bedtime  dextrose 50% Injectable 25 Gram(s) IV Push once  dextrose 50% Injectable 12.5 Gram(s) IV Push once  dextrose 50% Injectable 25 Gram(s) IV Push once  dextrose Oral Gel 15 Gram(s) Oral once  glucagon  Injectable 1 milliGRAM(s) IntraMuscular once  insulin lispro (ADMELOG) corrective regimen sliding scale   SubCutaneous three times a day before meals  insulin lispro (ADMELOG) corrective regimen sliding scale   SubCutaneous at bedtime  predniSONE   Tablet 75 milliGRAM(s) Oral daily  folic acid 1 milliGRAM(s) Oral daily  tacrolimus 5 milliGRAM(s) Oral <User Schedule>      REVIEW OF SYSTEMS:  Complete 12point ROS negative.    PHYSICAL EXAM:  T(C): 37 (03-04-23 @ 04:32), Max: 37.8 (03-03-23 @ 17:46)  HR: 110 (03-04-23 @ 06:45) (92 - 142)  BP: 107/77 (03-04-23 @ 04:32) (87/71 - 128/69)  RR: 17 (03-04-23 @ 04:32) (17 - 26)  SpO2: 92% (03-04-23 @ 04:32) (92% - 98%)  Wt(kg): --  I&O's Summary      Appearance: Normal	  HEENT:   Normal oral mucosa, PERRL, EOMI	  Lymphatic: No lymphadenopathy  Cardiovascular: Normal S1 S2, No JVD, No murmurs, No edema  Respiratory: Lungs clear to auscultation	  Psychiatry: A & O x 3, Mood & affect appropriate  Gastrointestinal:  Soft, Non-tender, + BS	  Skin: No rashes, No ecchymoses, No cyanosis	  Neurologic: Non-focal  Extremities: Normal range of motion, No clubbing, cyanosis or edema  Vascular: Peripheral pulses palpable 2+ bilaterally        LABS:	 	    CBC Full  -  ( 04 Mar 2023 00:46 )  WBC Count : 6.24 K/uL  Hemoglobin : 11.1 g/dL  Hematocrit : 34.2 %  Platelet Count - Automated : 109 K/uL  Mean Cell Volume : 102.7 fl  Mean Cell Hemoglobin : 33.3 pg  Mean Cell Hemoglobin Concentration : 32.5 gm/dL  Auto Neutrophil # : 5.33 K/uL  Auto Lymphocyte # : 0.61 K/uL  Auto Monocyte # : 0.22 K/uL  Auto Eosinophil # : 0.01 K/uL  Auto Basophil # : 0.01 K/uL  Auto Neutrophil % : 85.3 %  Auto Lymphocyte % : 9.8 %  Auto Monocyte % : 3.5 %  Auto Eosinophil % : 0.2 %  Auto Basophil % : 0.2 %    03-04    136  |  103  |  40<H>  ----------------------------<  438<H>  4.8   |  19<L>  |  1.85<H>  03-03    133<L>  |  98  |  44<H>  ----------------------------<  544<HH>  4.9   |  20<L>  |  2.04<H>    Ca    7.7<L>      04 Mar 2023 00:46  Ca    9.1      03 Mar 2023 15:09  Mg     1.7     03-04  Mg     1.8     03-03    TPro  5.1<L>  /  Alb  2.4<L>  /  TBili  1.3<H>  /  DBili  x   /  AST  32  /  ALT  24  /  AlkPhos  53  03-04  TPro  6.0  /  Alb  3.3  /  TBili  1.7<H>  /  DBili  x   /  AST  23  /  ALT  30  /  AlkPhos  66  03-03      proBNP: Serum Pro-Brain Natriuretic Peptide: 3349 pg/mL (03-03 @ 15:09)    Lipid Profile:   HgA1c:   TSH:       CARDIAC MARKERS:          TELEMETRY: 	    ECG:  	  RADIOLOGY:  OTHER: 	    PREVIOUS DIAGNOSTIC TESTING:    [ ] Echocardiogram:    [ ]  Catheterization:  [ ] Stress Test:  	  	  ASSESSMENT/PLAN: 	     24H hour events:   No acute events overnight; review of tele with spontaneous conversion to sinus rhythm, no conversion pause    MEDICATIONS:  apixaban 5 milliGRAM(s) Oral every 12 hours  aspirin enteric coated 81 milliGRAM(s) Oral daily  metoprolol succinate  milliGRAM(s) Oral daily  cefepime   IVPB 1000 milliGRAM(s) IV Intermittent every 12 hours  trimethoprim   80 mG/sulfamethoxazole 400 mG 1 Tablet(s) Oral every 48 hours  valGANciclovir 450 milliGRAM(s) Oral every 48 hours  vancomycin  IVPB      acetaminophen     Tablet .. 650 milliGRAM(s) Oral every 6 hours PRN  melatonin 3 milliGRAM(s) Oral at bedtime PRN  pantoprazole    Tablet 40 milliGRAM(s) Oral before breakfast  atorvastatin 10 milliGRAM(s) Oral at bedtime  dextrose 50% Injectable 25 Gram(s) IV Push once  dextrose 50% Injectable 12.5 Gram(s) IV Push once  dextrose 50% Injectable 25 Gram(s) IV Push once  dextrose Oral Gel 15 Gram(s) Oral once  glucagon  Injectable 1 milliGRAM(s) IntraMuscular once  insulin lispro (ADMELOG) corrective regimen sliding scale   SubCutaneous three times a day before meals  insulin lispro (ADMELOG) corrective regimen sliding scale   SubCutaneous at bedtime  predniSONE   Tablet 75 milliGRAM(s) Oral daily  folic acid 1 milliGRAM(s) Oral daily  tacrolimus 5 milliGRAM(s) Oral <User Schedule>      REVIEW OF SYSTEMS:  Complete 12point ROS negative.    PHYSICAL EXAM:  T(C): 37 (03-04-23 @ 04:32), Max: 37.8 (03-03-23 @ 17:46)  HR: 110 (03-04-23 @ 06:45) (92 - 142)  BP: 107/77 (03-04-23 @ 04:32) (87/71 - 128/69)  RR: 17 (03-04-23 @ 04:32) (17 - 26)  SpO2: 92% (03-04-23 @ 04:32) (92% - 98%)  Wt(kg): --  I&O's Summary      Appearance: Normal, in NAD  Cardiovascular: RRR S1 S2  Respiratory: Lungs clear to auscultation	  Psychiatry: A & O x 3, Mood & affect appropriate  Gastrointestinal:  Soft, Non-tender, + BS	  Skin: No rashes, No ecchymoses  Extremities: Normal range of motion, no c/c/e  Vascular: Peripheral pulses palpable 2+ bilaterally        LABS:	 	    CBC Full  -  ( 04 Mar 2023 00:46 )  WBC Count : 6.24 K/uL  Hemoglobin : 11.1 g/dL  Hematocrit : 34.2 %  Platelet Count - Automated : 109 K/uL  Mean Cell Volume : 102.7 fl  Mean Cell Hemoglobin : 33.3 pg  Mean Cell Hemoglobin Concentration : 32.5 gm/dL  Auto Neutrophil # : 5.33 K/uL  Auto Lymphocyte # : 0.61 K/uL  Auto Monocyte # : 0.22 K/uL  Auto Eosinophil # : 0.01 K/uL  Auto Basophil # : 0.01 K/uL  Auto Neutrophil % : 85.3 %  Auto Lymphocyte % : 9.8 %  Auto Monocyte % : 3.5 %  Auto Eosinophil % : 0.2 %  Auto Basophil % : 0.2 %    03-04    136  |  103  |  40<H>  ----------------------------<  438<H>  4.8   |  19<L>  |  1.85<H>  03-03    133<L>  |  98  |  44<H>  ----------------------------<  544<HH>  4.9   |  20<L>  |  2.04<H>    Ca    7.7<L>      04 Mar 2023 00:46  Ca    9.1      03 Mar 2023 15:09  Mg     1.7     03-04  Mg     1.8     03-03    TPro  5.1<L>  /  Alb  2.4<L>  /  TBili  1.3<H>  /  DBili  x   /  AST  32  /  ALT  24  /  AlkPhos  53  03-04  TPro  6.0  /  Alb  3.3  /  TBili  1.7<H>  /  DBili  x   /  AST  23  /  ALT  30  /  AlkPhos  66  03-03      proBNP: Serum Pro-Brain Natriuretic Peptide: 3349 pg/mL (03-03 @ 15:09)    Lipid Profile:   HgA1c:   TSH:       CARDIAC MARKERS:      TELEMETRY: Spontaneous conversion to NSR/ST 100s, no conversion pause      ECG 3/4/23: Sinus Tachycardia @ 117 bpm, RBBB, QRSd 114ms    Echo 3/3/23:    Dimensions:    Normal Values:  LA:     4.7    2.0 - 4.0 cm  Ao:     4.3    2.0 - 3.8 cm  SEPTUM: 1.1    0.6 - 1.2 cm  PWT:    0.9    0.6 - 1.1 cm  LVIDd:  3.6    3.0 - 5.6 cm  LVIDs:  2.8    1.8 - 4.0 cm  Derived variables:  LVMI: 59 g/m2  RWT: 0.50  Fractional short: 22 %  EF (Visual Estimate): 50-55 %  Doppler Peak Velocity (m/sec): AoV=1.1  ------------------------------------------------------------------------  Observations:  Mitral Valve: Normal mitral valve. Mild mitral  regurgitation.  Aortic Valve/Aorta: Normal trileaflet aortic valve. Peak  transaortic valve gradient equals 5 mm Hg, mean transaortic  valve gradient equals 3 mm Hg, aortic valve velocity time  integral equals 15 cm. Peak left ventricular outflow tract  gradient equals 6 mm Hg, mean gradient is equal to 4 mm Hg,  LVOT velocity time integral equals 15 cm.  Aortic Root: 4.3 cm.  Ascending Aorta:3.1 cm.  LVOT diameter: 2.3 cm.  Left Atrium: LA volume index = 11 cc/m2.  Left Ventricle: Endocardium not well visualized; grossly  normal left ventricular systolic function. Increased  relative wall thickness with normal left ventricular mass  index, consistent with concentric left ventricular  remodeling. Indeterminate diastolic function.  Right Heart: Normal right atrium. The right ventricle is  not well visualized; grossly normal right ventricular  systolic function. Normal tricuspid valve. Mild tricuspid  regurgitation. Normal pulmonic valve.  Pericardium/Pleura: Normal pericardium with no pericardial  effusion.  Hemodynamic: Estimated right atrial pressure is 8 mm Hg.  Estimated right ventricular systolic pressure equals 31 mm  Hg, assuming right atrial pressure equals 8 mm Hg,  consistent with normal pulmonary pressures.  ------------------------------------------------------------------------  Conclusions:  1. Increased relative wall thickness with normal left  ventricular mass index, consistent with concentric left  ventricular remodeling.  2. Endocardium not well visualized; grossly normal left  ventricular systolic function.  3. The right ventricle is not well visualized; grossly  normal right ventricular systolic function.  Heart rate 132 bpm

## 2023-03-04 NOTE — H&P ADULT - NSHPLABSRESULTS_GEN_ALL_CORE
.  LABS:                         11.1   6.24  )-----------( 109      ( 04 Mar 2023 00:46 )             34.2         136  |  103  |  40<H>  ----------------------------<  438<H>  4.8   |  19<L>  |  1.85<H>    Ca    7.7<L>      04 Mar 2023 00:46  Mg     1.7         TPro  5.1<L>  /  Alb  2.4<L>  /  TBili  1.3<H>  /  DBili  x   /  AST  32  /  ALT  24  /  AlkPhos  53        Urinalysis Basic - ( 03 Mar 2023 22:35 )    Color: Yellow / Appearance: Slightly Turbid / S.027 / pH: x  Gluc: x / Ketone: Negative  / Bili: Negative / Urobili: Negative   Blood: x / Protein: 30 mg/dL / Nitrite: Negative   Leuk Esterase: Negative / RBC: 1 /hpf / WBC 4 /HPF   Sq Epi: x / Non Sq Epi: 3 /hpf / Bacteria: Negative      Serum Pro-Brain Natriuretic Peptide: 3349 pg/mL ( @ 15:09)        RADIOLOGY, EKG & ADDITIONAL TESTS: Reviewed.

## 2023-03-04 NOTE — CONSULT NOTE ADULT - ASSESSMENT
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 EP consulted, transplant cards consulted, recommended continuing metoprolol 200mg and starting diltiazem gtt. CT C/A/P done, given vanc/zosyn x1, Bcx sent.  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   Lab with Hb 11.1, WBC 6.2 and  (stable)  CMP with NORMAN 1.8 and Bili of 1.3     Impression:  #URI in the setting of hMVP, ID is following   #Grayson's syndrome: -Follows with Dr. 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 on 1/4/23 for hemolytic anemia with hgb 6.5. He was treated with Pulse Decadron and IVIG. He received 1 unit of packed red blood cells. and then prednisone was started. He was discharged on 1/7/23. Feels well. No c/o. Was in remission with well compensated hemolytic anemia on low dose prednisone, but relapsed after tapered to 3 mg daily. Was started on prednisone 75 mg daily and is responding with improvement in counts.      Plan:  -Trend CBC+ANC+Retic daily   -Obtain hemolysis lab (LDH, haptoglobin, Fibrinogen and d dimer )  -Patient was examined he is in NAD, In view of stable condition and on Anti-bacterial and anti viral, and taking under consideration prior relaps of the AIHA when steroids were tapered will maintain on steroids 75mg as long as ID is agreeable.  -Monitor FSG and treat appropriately   -Further care by transplant team and ID        Case discussed with the attending Dr. Jimy White MD.   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   Lab with Hb 11.1, WBC 6.2 and  (stable)  CMP with NORMAN 1.8 and Bili of 1.3   RPP + hMVP   Impression:  #URI in the setting of hMVP, ID is following   #Grayson's syndrome: -Follows with Dr. 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 He was treated with Pulse Decadron and IVIG. He received 1 unit of packed red blood cells and then prednisone was started. He was discharged on 1/7/23. Was in remission with well compensated hemolytic anemia on low dose prednisone, but relapsed after tapered to 3 mg daily. Was started on prednisone 75 mg daily and is responding with improvement in counts.      Plan:  -Trend CBC+ANC+Retic daily   -Obtain hemolysis lab (LDH, haptoglobin, Fibrinogen and d dimer )  -Patient was examined he is in NAD, In view of stable condition on Anti-bacterial and anti viral, and taking under consideration prior relapse of the AIHA when steroids were tapered will maintain on steroids 75mg as long as ID is agreeable.  -Monitor FSG and treat appropriately   -Further care by transplant team and ID        Case discussed with the attending Dr. Jimy White MD.   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   Lab with Hb 11.1, WBC 6.2 and  (stable)  CMP with NORMAN 1.8 and Bili of 1.3   RPP + hMVP   Impression:  #URI in the setting of hMVP, ID is following   #Grayson's syndrome: -Follows with Dr. Rosario -Diagnosed after he was admitted to [facility] on 1/4/23 for hemolytic anemia with hgb 6.5. He was treated with Pulse Decadron and IVIG. He received 1 unit of packed red blood cells and then prednisone was started. He was discharged on 1/7/23. Was in remission with well compensated hemolytic anemia on low dose prednisone, but relapsed after tapered to 3 mg daily. Was started on prednisone 75 mg daily and is responding with improvement in counts.      Plan:  -Trend CBC+ANC+Retic daily   -Obtain hemolysis lab (LDH, haptoglobin, Fibrinogen and d dimer )  -Patient was examined he is in NAD, In view of stable condition on Anti-bacterial and anti viral, and taking under consideration prior relapse of the AIHA when steroids were tapered will maintain on steroids 75mg as long as ID is agreeable.  -Monitor FSG and treat appropriately   -Further care by transplant team and ID        Case discussed with the attending Dr. Jimy White MD.   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   Lab with Hb 11.1, WBC 6.2 and  (stable)  CMP with NORMAN 1.8 and Bili of 1.3   RPP + hMVP   Impression:  #URI in the setting of hMVP, ID is following   #Grayson's syndrome: -Follows with Dr. Rosario -Diagnosed after he was admitted to [facility] on 1/4/23 for hemolytic anemia with hgb 6.5. He was treated with Pulse Decadron and IVIG. He received 1 unit of packed red blood cells and then prednisone was started. He was discharged on 1/7/23. Was in remission with well compensated hemolytic anemia on low dose prednisone, but relapsed after tapered to 3 mg daily. Was started on prednisone 75 mg daily and is responding with improvement in counts.      Plan:  -Trend CBC+ANC+Retic daily   -Obtain hemolysis lab (LDH, haptoglobin, Fibrinogen and d dimer )  -Smear reviewed significant for micro-spherocytes   -Patient was examined he is in NAD, In view of stable condition on Anti-bacterial and anti viral, and taking under consideration prior relapse of the AIHA when steroids were tapered will maintain on steroids 75mg as long as ID is agreeable.  -Monitor FSG and treat appropriately   -Further care by transplant team and ID        Case discussed with the attending Dr. Jimy White MD.   PGY4 HEMONC fellow

## 2023-03-04 NOTE — PROGRESS NOTE ADULT - PROBLEM SELECTOR PLAN 8
Continue home tacrolimus 5mg qd; target level 6-8  - f/u tacro level  Continue prednisone 75mg qd  Prophylaxis:   - Bactrim --> f/u transplant ID re correct dosing  - Valganciclovir 450mg 2tabs daily  - Pantoprazole 40mg qd  Off cellcept due to leukopenia, recurrent infections

## 2023-03-04 NOTE — CONSULT NOTE ADULT - ASSESSMENT
71 year old male with PMH of HTN, HLD, NICM, chronic systolic heart failure s/p HM2 LVAD (6/2017) s/p heart transplant from Hep. C donor (treated) 2/23/18 (post op course complicated by graft dysfunction treated by plasmapheresis, IVIG, and rituximab), presenting w/ chest tightness x1d found to be in aflutter 2/2 pneumonia. Endocrinology consulted for management of diabetes.    Hyperglycemia post transplant  Steroid hyperglycmeia  - HbA1c: 4.7  - Home Regimen:   - Endocrinologist:  PLAN  - Hold oral DM agents while inpatient  - Start Lantus  units at bedtime. DO NOT HOLD IF NPO.  - Start Admelog  units TID pre-meal. HOLD IF NPO.  - Use moderate/Use low dose Admelog correction scale pre-meal  - Use moderate/Use low dose Admelog correction scale at bedtime  - Fingerstick BG before meals and bedtime  - Goal -180  - Carbohydrate consistent diet  - RD consult  Discharge plan:  - Discharge medications: ************************  - Patient to call doctor with persistent high or low BG at home.   - Ensure patient has glucometer, test strips and lancets on discharge.  - Recommend routine outpatient ophthalmology, podiatry and endocrinology f/u    HTN  - Home regimen: metoprolol succinate 200mg daily, losartan 100mg daily  PLAN  - Can check urine microalbumin outpatient  - Outpatient goal BP <130/80. Management per primary team.    HLD  - Home regimen: pravastatin 20mg daily  PLAN  - Continue atorvastatin 10mg daily per primary team  - Can check lipid profile if not done recently    Discussed with primary team.    Johnnie Brantley MD, Endocrinology Fellow  Pager [phone] from 9am to 5pm. After hours and on weekends, please call [phone].   71 year old male with PMH of HTN, HLD, NICM, chronic systolic heart failure s/p HM2 LVAD (6/2017) s/p heart transplant from Hep. C donor (treated) 2/23/18 (post op course complicated by graft dysfunction treated by plasmapheresis, IVIG, and rituximab), presenting w/ chest tightness x1d found to be in aflutter 2/2 pneumonia. Endocrinology consulted for management of diabetes.    Hyperglycemia post transplant  Steroid hyperglycmeia  - HbA1c: 4.7  - Home Regimen: does not take medication  - Endocrinologist: does not have  PLAN  - Hold oral DM agents while inpatient  - Start Lantus  units at bedtime. DO NOT HOLD IF NPO.  - Start Admelog  units TID pre-meal. HOLD IF NPO.  - Use moderate/Use low dose Admelog correction scale pre-meal  - Use moderate/Use low dose Admelog correction scale at bedtime  - Fingerstick BG before meals and bedtime  - Goal -180  - Carbohydrate consistent diet  - RD consult  Discharge plan:  - Discharge medications: ************************  - Patient to call doctor with persistent high or low BG at home.   - Ensure patient has glucometer, test strips and lancets on discharge.  - Recommend routine outpatient ophthalmology, podiatry and endocrinology f/u    HTN  - Home regimen: metoprolol succinate 200mg daily, losartan 100mg daily  PLAN  - Can check urine microalbumin outpatient  - Outpatient goal BP <130/80. Management per primary team.    HLD  - Home regimen: pravastatin 20mg daily  PLAN  - Continue atorvastatin 10mg daily per primary team  - Can check lipid profile if not done recently    Discussed with primary team.    Johnnie Brantley MD, Endocrinology Fellow  Pager [phone] from 9am to 5pm. After hours and on weekends, please call [phone].   71 year old male with PMH of HTN, HLD, NICM, chronic systolic heart failure s/p HM2 LVAD (6/2017) s/p heart transplant from Hep. C donor (treated) 2/23/18 (post op course complicated by graft dysfunction treated by plasmapheresis, IVIG, and rituximab), presenting w/ chest tightness x1d found to be in aflutter 2/2 pneumonia. Endocrinology consulted for management of diabetes.    Hyperglycemia post transplant  Steroid hyperglycemia  - HbA1c: 4.7  - Home Regimen: does not take medication  - Endocrinologist: does not have  - receiving prednisone 75mg daily starting 3/4  PLAN  - change antibiotics to non-dextrose containing formulation if possible  - Start Lantus  units at bedtime. DO NOT HOLD IF NPO.  - Start Admelog  units TID pre-meal. HOLD IF NPO.  - Use moderate dose Admelog correction scale pre-meal  - Use moderate dose Admelog correction scale at bedtime  - Fingerstick BG before meals and bedtime  - Goal -180  - Carbohydrate consistent diet  Discharge plan:  - Discharge medications: pending steroid plan  - Patient to call doctor with persistent high or low BG at home.   - Ensure patient has glucometer, test strips and lancets on discharge.  - Recommend routine outpatient ophthalmology, podiatry and endocrinology f/u    HTN  - Home regimen: metoprolol succinate 200mg daily, losartan 100mg daily  PLAN  - Can check urine microalbumin outpatient  - Outpatient goal BP <130/80. Management per primary team.    HLD  - Home regimen: pravastatin 20mg daily  PLAN  - Continue atorvastatin 10mg daily per primary team  - Can check lipid profile if not done recently    Discussed with primary team.    Johnnie Brantley MD, Endocrinology Fellow  Pager [phone] from 9am to 5pm. After hours and on weekends, please call [phone].   71 year old male with PMH of HTN, HLD, NICM, chronic systolic heart failure s/p HM2 LVAD (6/2017) s/p heart transplant from Hep. C donor (treated) 2/23/18 (post op course complicated by graft dysfunction treated by plasmapheresis, IVIG, and rituximab), presenting w/ chest tightness x1d found to be in aflutter 2/2 pneumonia. Endocrinology consulted for management of diabetes.    Hyperglycemia post transplant  Steroid hyperglycemia  - HbA1c: 4.7  - Home Regimen: does not take medication  - Endocrinologist: does not have  - receiving prednisone 75mg daily starting 3/4 for hemolytic anemia  PLAN  - change antibiotics to non-dextrose containing formulation if possible  - Start Lantus  units at bedtime. DO NOT HOLD IF NPO.  - Start Admelog  units TID pre-meal. HOLD IF NPO.  - Use moderate dose Admelog correction scale pre-meal  - Use moderate dose Admelog correction scale at bedtime  - Fingerstick BG before meals and bedtime  - Goal -180  - Carbohydrate consistent diet  Discharge plan:  - Discharge medications: pending steroid plan  - Patient to call doctor with persistent high or low BG at home.   - Ensure patient has glucometer, test strips and lancets on discharge.  - Recommend routine outpatient ophthalmology, podiatry and endocrinology f/u    HTN  - Home regimen: metoprolol succinate 200mg daily, losartan 100mg daily  PLAN  - Can check urine microalbumin outpatient  - Outpatient goal BP <130/80. Management per primary team.    HLD  - Home regimen: pravastatin 20mg daily  PLAN  - Continue atorvastatin 10mg daily per primary team  - Can check lipid profile if not done recently    Discussed with primary team.    Johnnie Brantley MD, Endocrinology Fellow  Pager [phone] from 9am to 5pm. After hours and on weekends, please call [phone].   71 year old male with PMH of HTN, HLD, NICM, chronic systolic heart failure s/p HM2 LVAD (6/2017) s/p heart transplant from Hep. C donor (treated) 2/23/18 (post op course complicated by graft dysfunction treated by plasmapheresis, IVIG, and rituximab), presenting w/ chest tightness x1d found to be in aflutter 2/2 pneumonia. Endocrinology consulted for management of diabetes.    Hyperglycemia post transplant  Steroid hyperglycemia  - HbA1c: 4.7  - Home Regimen: does not take medication  - Endocrinologist: does not have  - receiving prednisone 75mg daily starting 3/4 for hemolytic anemia  - appears has been on lantus 14, admelog 5/7/7 in the past while on steroids  PLAN  - change antibiotics to non-dextrose containing formulation if possible  - Start Lantus 14 units at bedtime. DO NOT HOLD IF NPO.  - Start Admelog 5 units TID pre-meal. HOLD IF NPO.  - Use moderate dose Admelog correction scale pre-meal  - Use moderate dose Admelog correction scale at bedtime  - Fingerstick BG before meals and bedtime  - Goal -180  - Carbohydrate consistent diet  Discharge plan:  - Discharge medications: pending steroid plan  - Patient to call doctor with persistent high or low BG at home.   - Ensure patient has glucometer, test strips and lancets on discharge.  - Recommend routine outpatient ophthalmology, podiatry and endocrinology f/u    HTN  - Home regimen: metoprolol succinate 200mg daily, losartan 100mg daily  PLAN  - Can check urine microalbumin outpatient  - Outpatient goal BP <130/80. Management per primary team.    HLD  - Home regimen: pravastatin 20mg daily  PLAN  - Continue atorvastatin 10mg daily per primary team  - Can check lipid profile if not done recently    Discussed with primary team.    Johnnie Brantley MD, Endocrinology Fellow  Pager [phone] from 9am to 5pm. After hours and on weekends, please call [phone].   72 year old male with PMH of HTN, HLD, NICM, chronic systolic heart failure s/p HM2 LVAD (6/2017) s/p heart transplant from Hep. C donor (treated) 2/23/18 (post op course complicated by graft dysfunction treated by plasmapheresis, IVIG, and rituximab), presenting w/ chest tightness x1d found to be in aflutter 2/2 pneumonia. Endocrinology consulted for management of diabetes.    Hyperglycemia post transplant  Steroid hyperglycemia  - HbA1c: 4.7%  - Home Regimen: does not take medication  - Endocrinologist: does not have  - receiving prednisone 75mg daily starting 3/4 for hemolytic anemia  - appears has been on lantus 14, admelog 5/7/7 in the past while on steroids  PLAN  - change antibiotics to non-dextrose containing formulation if possible  - Start Lantus 14 units at bedtime. DO NOT HOLD IF NPO.  - Start Admelog 5 units TID pre-meal. HOLD IF NPO.  - Use moderate dose Admelog correction scale pre-meal  - Use moderate dose Admelog correction scale at bedtime  - Fingerstick BG before meals and bedtime  - Goal -180  - Carbohydrate consistent diet  Discharge plan:  - Discharge medications: pending steroid plan  - Patient to call doctor with persistent high or low BG at home.   - Ensure patient has glucometer, test strips and lancets on discharge.  - Recommend routine outpatient ophthalmology, podiatry and endocrinology f/u    HTN  - Home regimen: metoprolol succinate 200mg daily, losartan 100mg daily  PLAN  - Can check urine microalbumin/cr ratio as outpatient  - Outpatient goal BP <130/80. Management per primary team.    HLD  - Home regimen: pravastatin 20mg daily  PLAN  - Continue atorvastatin 10mg daily per primary team  - Can check lipid profile if not done recently    Discussed with primary team.    Johnnie Brantley MD, Endocrinology Fellow  Pager [phone] from 9am to 5pm. After hours and on weekends, please call [phone].

## 2023-03-04 NOTE — H&P ADULT - PROBLEM SELECTOR PLAN 7
Followed by [facility] as outpatient; admitted 6.5 in Jan 2023, treated w/ 4d IV dexamethasone.   Per hematology, recommended to avoid blood transfusion unless patient is symptomatic due to risk of hyperhemolysis. Transfusion is okay from heart transplant perspective. s/p OHT in 2/23/18  - continue home Toprol  mg PO QD  - continue home ASA 81 and  pravastatin 20 mg PO QD  - continue home pantoprazole 40 mg PO QD    - HOLD losartan 100mg qd due to NORMAN, dilt gtt  - underwent annual RHC/EMBx with Dr. Carpenter in February 2023

## 2023-03-04 NOTE — PROGRESS NOTE ADULT - PROBLEM SELECTOR PLAN 1
- s/p OHT in 2/23/18  - continue home Toprol  mg PO QD and Losartan 100 mg PO QD  - continue home ASA 81 and  pravastatin 20 mg PO QD  - continue home pantoprazole 40 mg PO QD    - off home losartan  - underwent annual RHC/EMBx with Dr. Carpenter in February 2022

## 2023-03-04 NOTE — PROGRESS NOTE ADULT - PROBLEM SELECTOR PLAN 7
s/p OHT in 2/23/18  - continue home Toprol  mg PO QD  - continue home ASA 81 and  pravastatin 20 mg PO QD  - continue home pantoprazole 40 mg PO QD    - HOLD losartan 100mg qd due to NORMAN, dilt gtt  - underwent annual RHC/EMBx with Dr. Carpenter in February 2023 s/p OHT in 2/23/18  - continue home Toprol  mg PO QD  - continue home ASA 81 and  pravastatin 20 mg PO QD  - continue home pantoprazole 40 mg PO QD    - HOLD losartan 100mg qd due to NORMAN, dilt gtt if HR >120s  - underwent annual RHC/EMBx with Dr. Carpenter in February 2023

## 2023-03-04 NOTE — H&P ADULT - NSHPPHYSICALEXAM_GEN_ALL_CORE
LOS:     VITALS:   T(C): 37.1 (03-04-23 @ 01:48), Max: 37.8 (03-03-23 @ 17:46)  HR: 100 (03-04-23 @ 01:48) (92 - 142)  BP: 101/80 (03-04-23 @ 01:48) (87/71 - 120/82)  RR: 17 (03-04-23 @ 01:48) (17 - 26)  SpO2: 97% (03-04-23 @ 01:48) (92% - 98%)    GENERAL: NAD, lying in bed comfortably  HEAD:  Atraumatic, Normocephalic  EYES: EOMI, PERRLA, conjunctiva and sclera clear  ENT: Moist mucous membranes  NECK: Supple, No JVD  CHEST/LUNG: Clear to auscultation bilaterally; No rales, rhonchi, wheezing, or rubs. Unlabored respirations  HEART: Regular rate and rhythm; No murmurs, rubs, or gallops  ABDOMEN: BSx4; Soft, nontender, nondistended  EXTREMITIES:  2+ Peripheral Pulses, brisk capillary refill. No clubbing, cyanosis, or edema  NERVOUS SYSTEM:  A&Ox3, no focal deficits   SKIN: No rashes or lesions LOS:     VITALS:   T(C): 37.1 (03-04-23 @ 01:48), Max: 37.8 (03-03-23 @ 17:46)  HR: 100 (03-04-23 @ 01:48) (92 - 142)  BP: 101/80 (03-04-23 @ 01:48) (87/71 - 120/82)  RR: 17 (03-04-23 @ 01:48) (17 - 26)  SpO2: 97% (03-04-23 @ 01:48) (92% - 98%)    GENERAL: NAD, lying in bed comfortably  ENT: Moist mucous membranes  NECK: Supple, No JVD  CHEST/LUNG: sternotomy scar, R-side w/ faint crackles, poor air entry; L-side clear  HEART: Regular rate and rhythm; No murmurs, rubs, or gallops  ABDOMEN: BSx4; Soft, nontender, nondistended  EXTREMITIES:  2+ Peripheral Pulses, brisk capillary refill. No clubbing, cyanosis, or edema  NERVOUS SYSTEM:  A&Ox3, strength 5/5 all muscle groups UEs and LEs   SKIN: No rashes or lesions

## 2023-03-04 NOTE — PROGRESS NOTE ADULT - PROBLEM SELECTOR PLAN 11
Diet: DASH, Carb consistent  DVT: Eliquis 5mg BID Diet: DASH, Carb consistent  DVT: Eliquis 5mg BID  Dispo: Pending Clinical improvement  Code Status: Full Code     Patient ask that we do not discuss this case with his family at this time.

## 2023-03-04 NOTE — H&P ADULT - PROBLEM SELECTOR PLAN 5
On febuxostat 40mg qd, followed by rheumatology s/p OHT in 2/23/18  - continue home Toprol  mg PO QD and Losartan 100 mg PO QD  - continue home ASA 81 and  pravastatin 20 mg PO QD  - continue home pantoprazole 40 mg PO QD    - underwent annual RHC/EMBx with Dr. Carpenter in February 2023 Prednisone-induced hyperglycemia w/ blood glucose elevated to 400-500 on admission. Based on outpatient records, patient has been hyperglycemic in the past, last A1c 4.7; not currently on any JAVIER or insulin. Not given any insulin in ED.   - BHB wnl, anion gap wnl  - given 5u regular insulin;  at the time  - increase standing insulin based on 24h requirements  - started ISS, hypoglycemia protocol

## 2023-03-04 NOTE — CONSULT NOTE ADULT - SUBJECTIVE AND OBJECTIVE BOX
CHIEF COMPLAINT:    HPI:  HPI:  71 year old male with PMH of HTN, HLD, NICM, chronic systolic heart failure s/p HM2 LVAD (2017) s/p heart transplant from Hep. C donor (treated) 18 (post op course complicated by graft dysfunction treated by plasmapheresis, IVIG, and rituximab), presenting w/ chest tightness x1d. Reports tightness started suddenly 1 day ago. Patient is a poor historian and is unable to elaborate much further. Denies associated symptoms - no SOB, palpitations, arm/jaw pain, nausea, vomiting, orthopnea, PND. Has a non-productive cough that he reports has been going on for a while. Denies fevers, congestion, constitutional symptoms, sick contacts. No new limits on exercise tolerance - at baseline is able to walk 2 blocks before becoming dyspneic.     Admitted  for routine LHC and endomyocardial biopsy; coronaries patent. Admitted 2023 for Hgb 6.5 secondary to AIHA; treated w/ 4d IV dexamethasone.     ED vitals: 120/82, , RR 24, afebrile  ED course: Gave home dose of Metoprolol succinate 200mg for tachycardia. EP consulted, transplant cards consulted, recommended continuing metoprolol 200mg and starting diltiazem gtt. CT C/A/P done, given vanc/zosyn x1, Bcx sent.  (04 Mar 2023 02:05)    Patient remains afebrile, tmax rectal once 100.1, mildly tachycardic, intermittently soft BP, tachypneic, saturating well on RA  no leukocytosis but elevated bandemia, procal 2.39, improving hyperglycemia and NORMAN       PAST MEDICAL & SURGICAL HISTORY:  HTN      SVT (Supraventricular Tachycardia)      Non-Ischemic Cardiomyopathy  now s/p transplant       GIB (gastrointestinal bleeding)      Hepatitis C virus      DVT of upper extremity (deep vein thrombosis)      Former smoker      HLD (hyperlipidemia)      Knee pain, right      H/O autoimmune hemolytic anemia      H/O hemolytic anemia      Status post left hip replacement      H/O heart transplant  2018          FAMILY HISTORY:      SOCIAL HISTORY:  Smoking: [ ] Never Smoked [ ] Former Smoker (__ packs x ___ years) [ ] Current Smoker  (__ packs x ___ years)  Substance Use: [ ] Never Used [ ] Used ____  EtOH Use:  Marital Status: [ ] Single [ ]  [ ]  [ ]   Sexual History:   Occupation:  Recent Travel:  Country of Birth:  Advance Directives:    Allergies    No Known Allergies    Intolerances        HOME MEDICATIONS:  Home Medications:  aspirin 81 mg oral tablet:  (04 Mar 2023 03:20)  Bactrim 400 mg-80 mg oral tablet: 1 tab(s) orally once a day (04 Mar 2023 03:20)  febuxostat 40 mg oral tablet: 1 tab(s) orally once a day (04 Mar 2023 03:20)  folic acid 1 mg oral tablet: 1 tab(s) orally once a day (04 Mar 2023 03:20)  losartan 100 mg oral tablet: 1 tab(s) orally once a day (04 Mar 2023 03:20)  metoprolol succinate 200 mg oral tablet, extended release: 1 tab(s) orally once a day (04 Mar 2023 03:20)  pantoprazole 40 mg oral delayed release tablet: 1 tab(s) orally once a day (04 Mar 2023 03:20)  pravastatin 20 mg oral tablet: 1 tab(s) orally once a day (04 Mar 2023 03:20)  valGANciclovir 450 mg oral tablet: 2 tab(s) orally once a day (04 Mar 2023 03:20)      REVIEW OF SYSTEMS:  Constitutional: [ ] negative [ ] fevers [ ] chills [ ] weight loss [ ] weight gain  HEENT: [ ] negative [ ] dry eyes [ ] eye irritation [ ] postnasal drip [ ] nasal congestion  CV: [ ] negative  [ ] chest pain [ ] orthopnea [ ] palpitations [ ] murmur  Resp: [ ] negative [ ] cough [ ] shortness of breath [ ] dyspnea [ ] wheezing [ ] sputum [ ] hemoptysis  GI: [ ] negative [ ] nausea [ ] vomiting [ ] diarrhea [ ] constipation [ ] abd pain [ ] dysphagia   : [ ] negative [ ] dysuria [ ] nocturia [ ] hematuria [ ] increased urinary frequency  Musculoskeletal: [ ] negative [ ] back pain [ ] myalgias [ ] arthralgias [ ] fracture  Skin: [ ] negative [ ] rash [ ] itch  Neurological: [ ] negative [ ] headache [ ] dizziness [ ] syncope [ ] weakness [ ] numbness  Psychiatric: [ ] negative [ ] anxiety [ ] depression  Endocrine: [ ] negative [ ] diabetes [ ] thyroid problem  Hematologic/Lymphatic: [ ] negative [ ] anemia [ ] bleeding problem  Allergic/Immunologic: [ ] negative [ ] itchy eyes [ ] nasal discharge [ ] hives [ ] angioedema  [ ] All other systems negative  [ ] Unable to assess ROS because ________    OBJECTIVE:  ICU Vital Signs Last 24 Hrs  T(C): 37 (04 Mar 2023 04:32), Max: 37.8 (03 Mar 2023 17:46)  T(F): 98.6 (04 Mar 2023 04:32), Max: 100.1 (03 Mar 2023 17:46)  HR: 110 (04 Mar 2023 06:45) (92 - 142)  BP: 107/77 (04 Mar 2023 04:32) (87/71 - 128/69)  BP(mean): 71 (03 Mar 2023 23:03) (71 - 83)  ABP: --  ABP(mean): --  RR: 17 (04 Mar 2023 04:32) (17 - 26)  SpO2: 92% (04 Mar 2023 04:32) (92% - 98%)    O2 Parameters below as of 04 Mar 2023 04:32  Patient On (Oxygen Delivery Method): room air              CAPILLARY BLOOD GLUCOSE      POCT Blood Glucose.: 254 mg/dL (04 Mar 2023 11:04)      PHYSICAL EXAM:  General: awake and alert, nontoxic appearing *** lying in bed  HEENT: NC/AT, EOMI b/l, conjunctiva normal, MMM  Lymph Nodes: no cervical LAD  Neck: supple. full range of motion  Respiratory: CTA b/l, no w/r/c, appears comfortable on ***, no conversational dyspnea or accessory muscle use  Cardiovascular: S1 S2 present, RRR, no m/r/g  Abdomen: soft, NT/ND, +BS  Extremities: no c/c/e  Skin: no rashes or lesions noted  Neurological: AAOx3, no focal deficits  Psychiatry: calm, cooperative    LINES:     HOSPITAL MEDICATIONS:  Standing Meds:  apixaban 5 milliGRAM(s) Oral every 12 hours  aspirin enteric coated 81 milliGRAM(s) Oral daily  atorvastatin 10 milliGRAM(s) Oral at bedtime  cefepime   IVPB 2000 milliGRAM(s) IV Intermittent every 12 hours  cefepime   IVPB      dextrose 50% Injectable 25 Gram(s) IV Push once  dextrose 50% Injectable 12.5 Gram(s) IV Push once  dextrose 50% Injectable 25 Gram(s) IV Push once  dextrose Oral Gel 15 Gram(s) Oral once  folic acid 1 milliGRAM(s) Oral daily  glucagon  Injectable 1 milliGRAM(s) IntraMuscular once  insulin lispro (ADMELOG) corrective regimen sliding scale   SubCutaneous three times a day before meals  insulin lispro (ADMELOG) corrective regimen sliding scale   SubCutaneous at bedtime  metoprolol succinate  milliGRAM(s) Oral daily  pantoprazole    Tablet 40 milliGRAM(s) Oral before breakfast  predniSONE   Tablet 75 milliGRAM(s) Oral daily  tacrolimus 5 milliGRAM(s) Oral <User Schedule>  trimethoprim   80 mG/sulfamethoxazole 400 mG 1 Tablet(s) Oral daily  valGANciclovir 450 milliGRAM(s) Oral daily  vancomycin  IVPB          PRN Meds:  acetaminophen     Tablet .. 650 milliGRAM(s) Oral every 6 hours PRN  melatonin 3 milliGRAM(s) Oral at bedtime PRN      LABS:                        11.1   6.24  )-----------( 109      ( 04 Mar 2023 00:46 )             34.2     Hgb Trend: 11.1<--, 10.7<--, 13.3<--  03-04    136  |  103  |  40<H>  ----------------------------<  438<H>  4.8   |  19<L>  |  1.85<H>    Ca    7.7<L>      04 Mar 2023 00:46  Mg     1.7     -    TPro  5.1<L>  /  Alb  2.4<L>  /  TBili  1.3<H>  /  DBili  x   /  AST  32  /  ALT  24  /  AlkPhos  53  03-04    Creatinine Trend: 1.85<--, 2.04<--    Urinalysis Basic - ( 03 Mar 2023 22:35 )    Color: Yellow / Appearance: Slightly Turbid / S.027 / pH: x  Gluc: x / Ketone: Negative  / Bili: Negative / Urobili: Negative   Blood: x / Protein: 30 mg/dL / Nitrite: Negative   Leuk Esterase: Negative / RBC: 1 /hpf / WBC 4 /HPF   Sq Epi: x / Non Sq Epi: 3 /hpf / Bacteria: Negative        Venous Blood Gas:   @ 17:38  7.31/42/26/21/41.6  VBG Lactate: 2.0      MICROBIOLOGY:       RADIOLOGY:  [x] Reviewed and interpreted by me    Ct chest   Impacted bronchus intermedius with near complete   collapse of the right middle and lower lobes, with slightly increased   aeration as compared to 2020. Patchy areas of consolidation within   the right upper lobe and left lower lobe and lingula. Emphysema.    ECHO grossly normal LV, RV and possible diastolic dysfunction    EKG:   CHIEF COMPLAINT:    HPI:  HPI:  71 year old male with PMH of HTN, HLD, NICM, chronic systolic heart failure s/p HM2 LVAD (2017) s/p heart transplant from Hep. C donor (treated) 18 (post op course complicated by graft dysfunction treated by plasmapheresis, IVIG, and rituximab), presenting w/ chest tightness x1d. Reports tightness started suddenly 1 day ago. Patient is a poor historian and is unable to elaborate much further. Denies associated symptoms - no SOB, palpitations, arm/jaw pain, nausea, vomiting, orthopnea, PND. Has a non-productive cough that he reports has been going on for a while. Denies fevers, congestion, constitutional symptoms, sick contacts. No new limits on exercise tolerance - at baseline is able to walk 2 blocks before becoming dyspneic.     Admitted  for routine LHC and endomyocardial biopsy; coronaries patent. Admitted 2023 for Hgb 6.5 secondary to AIHA; treated w/ 4d IV dexamethasone.     ED vitals: 120/82, , RR 24, afebrile  ED course: Gave home dose of Metoprolol succinate 200mg for tachycardia. EP consulted, transplant cards consulted, recommended continuing metoprolol 200mg and starting diltiazem gtt. CT C/A/P done, given vanc/zosyn x1, Bcx sent.  (04 Mar 2023 02:05)    Patient remains afebrile, tmax rectal once 100.1, mildly tachycardic, intermittently soft BP, tachypneic, saturating well on RA  no leukocytosis but elevated bandemia, procal 2.39, improving hyperglycemia and NORMAN     Patient resting comfortably flat on RA without any desaturations. Patient denies fevers at home. has been taking medications although cannot verbalize if on steroids chronically or not. Has had bronchoscopy in the past prior to transplant although none after. Denies smoking, does not wheeze, usually can walk 2 blocks, . Denies chest pain, leg swelling . Has a productive cough.     PAST MEDICAL & SURGICAL HISTORY:  HTN      SVT (Supraventricular Tachycardia)      Non-Ischemic Cardiomyopathy  now s/p transplant       GIB (gastrointestinal bleeding)      Hepatitis C virus      DVT of upper extremity (deep vein thrombosis)      Former smoker      HLD (hyperlipidemia)      Knee pain, right      H/O autoimmune hemolytic anemia      H/O hemolytic anemia      Status post left hip replacement      H/O heart transplant  2018          FAMILY HISTORY:      SOCIAL HISTORY:  Smoking: [ ] Never Smoked [ ] Former Smoker (__ packs x ___ years) [ ] Current Smoker  (__ packs x ___ years)  Substance Use: [ ] Never Used [ ] Used ____  EtOH Use:  Marital Status: [ ] Single [ ]  [ ]  [ ]   Sexual History:   Occupation:  Recent Travel:  Country of Birth:  Advance Directives:    Allergies    No Known Allergies    Intolerances        HOME MEDICATIONS:  Home Medications:  aspirin 81 mg oral tablet:  (04 Mar 2023 03:20)  Bactrim 400 mg-80 mg oral tablet: 1 tab(s) orally once a day (04 Mar 2023 03:20)  febuxostat 40 mg oral tablet: 1 tab(s) orally once a day (04 Mar 2023 03:20)  folic acid 1 mg oral tablet: 1 tab(s) orally once a day (04 Mar 2023 03:20)  losartan 100 mg oral tablet: 1 tab(s) orally once a day (04 Mar 2023 03:20)  metoprolol succinate 200 mg oral tablet, extended release: 1 tab(s) orally once a day (04 Mar 2023 03:20)  pantoprazole 40 mg oral delayed release tablet: 1 tab(s) orally once a day (04 Mar 2023 03:20)  pravastatin 20 mg oral tablet: 1 tab(s) orally once a day (04 Mar 2023 03:20)  valGANciclovir 450 mg oral tablet: 2 tab(s) orally once a day (04 Mar 2023 03:20)      REVIEW OF SYSTEMS:  Constitutional: [ ] negative [ ] fevers [ ] chills [ ] weight loss [ ] weight gain  HEENT: [ ] negative [ ] dry eyes [ ] eye irritation [ ] postnasal drip [ ] nasal congestion  CV: [ ] negative  [ ] chest pain [ ] orthopnea [ ] palpitations [ ] murmur  Resp: [ ] negative [ ] cough [ ] shortness of breath [ ] dyspnea [ ] wheezing [ ] sputum [ ] hemoptysis  GI: [ ] negative [ ] nausea [ ] vomiting [ ] diarrhea [ ] constipation [ ] abd pain [ ] dysphagia   : [ ] negative [ ] dysuria [ ] nocturia [ ] hematuria [ ] increased urinary frequency  Musculoskeletal: [ ] negative [ ] back pain [ ] myalgias [ ] arthralgias [ ] fracture  Skin: [ ] negative [ ] rash [ ] itch  Neurological: [ ] negative [ ] headache [ ] dizziness [ ] syncope [ ] weakness [ ] numbness  Psychiatric: [ ] negative [ ] anxiety [ ] depression  Endocrine: [ ] negative [ ] diabetes [ ] thyroid problem  Hematologic/Lymphatic: [ ] negative [ ] anemia [ ] bleeding problem  Allergic/Immunologic: [ ] negative [ ] itchy eyes [ ] nasal discharge [ ] hives [ ] angioedema  [ ] All other systems negative  [ ] Unable to assess ROS because ________    OBJECTIVE:  ICU Vital Signs Last 24 Hrs  T(C): 37 (04 Mar 2023 04:32), Max: 37.8 (03 Mar 2023 17:46)  T(F): 98.6 (04 Mar 2023 04:32), Max: 100.1 (03 Mar 2023 17:46)  HR: 110 (04 Mar 2023 06:45) (92 - 142)  BP: 107/77 (04 Mar 2023 04:32) (87/71 - 128/69)  BP(mean): 71 (03 Mar 2023 23:03) (71 - 83)  ABP: --  ABP(mean): --  RR: 17 (04 Mar 2023 04:32) (17 - 26)  SpO2: 92% (04 Mar 2023 04:32) (92% - 98%)    O2 Parameters below as of 04 Mar 2023 04:32  Patient On (Oxygen Delivery Method): room air              CAPILLARY BLOOD GLUCOSE      POCT Blood Glucose.: 254 mg/dL (04 Mar 2023 11:04)      PHYSICAL EXAM:  General: awake and alert, nontoxic appearing lying in bed  HEENT: NC/AT, EOMI b/l, conjunctiva normal, MMM  Lymph Nodes: no cervical LAD  Neck: supple. full range of motion  Respiratory: CTA b/l, no w/r/c, appears comfortable on RA, no conversational dyspnea or accessory muscle use. poor air movement in RLL  Cardiovascular: S1 S2 present, RRR, no m/r/g  Abdomen: soft, NT/ND, +BS  Extremities: no c/c/e  Skin: no rashes or lesions noted  Neurological: AAOx3, no focal deficits  Psychiatry: calm, cooperative    LINES:     HOSPITAL MEDICATIONS:  Standing Meds:  apixaban 5 milliGRAM(s) Oral every 12 hours  aspirin enteric coated 81 milliGRAM(s) Oral daily  atorvastatin 10 milliGRAM(s) Oral at bedtime  cefepime   IVPB 2000 milliGRAM(s) IV Intermittent every 12 hours  cefepime   IVPB      dextrose 50% Injectable 25 Gram(s) IV Push once  dextrose 50% Injectable 12.5 Gram(s) IV Push once  dextrose 50% Injectable 25 Gram(s) IV Push once  dextrose Oral Gel 15 Gram(s) Oral once  folic acid 1 milliGRAM(s) Oral daily  glucagon  Injectable 1 milliGRAM(s) IntraMuscular once  insulin lispro (ADMELOG) corrective regimen sliding scale   SubCutaneous three times a day before meals  insulin lispro (ADMELOG) corrective regimen sliding scale   SubCutaneous at bedtime  metoprolol succinate  milliGRAM(s) Oral daily  pantoprazole    Tablet 40 milliGRAM(s) Oral before breakfast  predniSONE   Tablet 75 milliGRAM(s) Oral daily  tacrolimus 5 milliGRAM(s) Oral <User Schedule>  trimethoprim   80 mG/sulfamethoxazole 400 mG 1 Tablet(s) Oral daily  valGANciclovir 450 milliGRAM(s) Oral daily  vancomycin  IVPB            PRN Meds:  acetaminophen     Tablet .. 650 milliGRAM(s) Oral every 6 hours PRN  melatonin 3 milliGRAM(s) Oral at bedtime PRN      LABS:                        11.1   6.24  )-----------( 109      ( 04 Mar 2023 00:46 )             34.2     Hgb Trend: 11.1<--, 10.7<--, 13.3<--  03    136  |  103  |  40<H>  ----------------------------<  438<H>  4.8   |  19<L>  |  1.85<H>    Ca    7.7<L>      04 Mar 2023 00:46  Mg     1.7     04    TPro  5.1<L>  /  Alb  2.4<L>  /  TBili  1.3<H>  /  DBili  x   /  AST  32  /  ALT  24  /  AlkPhos  53  03-04    Creatinine Trend: 1.85<--, 2.04<--    Urinalysis Basic - ( 03 Mar 2023 22:35 )    Color: Yellow / Appearance: Slightly Turbid / S.027 / pH: x  Gluc: x / Ketone: Negative  / Bili: Negative / Urobili: Negative   Blood: x / Protein: 30 mg/dL / Nitrite: Negative   Leuk Esterase: Negative / RBC: 1 /hpf / WBC 4 /HPF   Sq Epi: x / Non Sq Epi: 3 /hpf / Bacteria: Negative        Venous Blood Gas:   @ 17:38  7.31/42/26/21/41.6  VBG Lactate: 2.0      MICROBIOLOGY:       RADIOLOGY:  [x] Reviewed and interpreted by me    Ct chest   Impacted bronchus intermedius with near complete   collapse of the right middle and lower lobes, with slightly increased   aeration as compared to 2020. Patchy areas of consolidation within   the right upper lobe and left lower lobe and lingula. Emphysema.    ECHO grossly normal LV, RV and possible diastolic dysfunction    EKG:

## 2023-03-04 NOTE — CONSULT NOTE ADULT - ATTENDING COMMENTS
72 y.o. male with h/o diabetes post renal transplant now with steroid induced hyperglycemia.    -Blood glucose target is 100 to 180  -Will start basal bolus regimen with Lantus 14 units SQ daily and Admelog 5 units QAC with moderate correction scale  -Will determine outpatient regimen based on blood glucose levels and if patient will be discharged on steroids      Sung West DO  Attending Division of Endocrinology  [phone]

## 2023-03-04 NOTE — PROGRESS NOTE ADULT - SUBJECTIVE AND OBJECTIVE BOX
Patient seen and examined at bedside.    Overnight Events: no events, issues or complaints    Review of Systems:  REVIEW OF SYSTEMS:  CONSTITUTIONAL: No weakness, fevers or chills  EYES/ENT: No visual changes;  No dysphagia  NECK: No pain or stiffness  RESPIRATORY:  cough; no wheezing, hemoptysis; No shortness of breath  CARDIOVASCULAR: No chest pain or palpitations; No lower extremity edema  GASTROINTESTINAL: No abdominal or epigastric pain. No nausea, vomiting, or hematemesis; No diarrhea or constipation. No melena or hematochezia.  BACK: No back pain  GENITOURINARY: No dysuria, frequency or hematuria  NEUROLOGICAL: No numbness or weakness  SKIN: No itching, burning, rashes, or lesions   All other review of systems is negative unless indicated above.    [x ] All other systems negative  [ ] Unable to assess ROS due to    Current Meds:  acetaminophen     Tablet .. 650 milliGRAM(s) Oral every 6 hours PRN  apixaban 5 milliGRAM(s) Oral every 12 hours  aspirin enteric coated 81 milliGRAM(s) Oral daily  atorvastatin 10 milliGRAM(s) Oral at bedtime  cefepime   IVPB 1000 milliGRAM(s) IV Intermittent every 12 hours  dextrose 50% Injectable 25 Gram(s) IV Push once  dextrose 50% Injectable 12.5 Gram(s) IV Push once  dextrose 50% Injectable 25 Gram(s) IV Push once  dextrose Oral Gel 15 Gram(s) Oral once  folic acid 1 milliGRAM(s) Oral daily  glucagon  Injectable 1 milliGRAM(s) IntraMuscular once  insulin lispro (ADMELOG) corrective regimen sliding scale   SubCutaneous three times a day before meals  insulin lispro (ADMELOG) corrective regimen sliding scale   SubCutaneous at bedtime  melatonin 3 milliGRAM(s) Oral at bedtime PRN  metoprolol succinate  milliGRAM(s) Oral daily  pantoprazole    Tablet 40 milliGRAM(s) Oral before breakfast  predniSONE   Tablet 75 milliGRAM(s) Oral daily  tacrolimus 5 milliGRAM(s) Oral <User Schedule>  trimethoprim   80 mG/sulfamethoxazole 400 mG 1 Tablet(s) Oral every 48 hours  valGANciclovir 450 milliGRAM(s) Oral every 48 hours  vancomycin  IVPB          PAST MEDICAL & SURGICAL HISTORY:  HTN      SVT (Supraventricular Tachycardia)      Non-Ischemic Cardiomyopathy  now s/p transplant 2018      GIB (gastrointestinal bleeding)      Hepatitis C virus      DVT of upper extremity (deep vein thrombosis)      Former smoker      HLD (hyperlipidemia)      Knee pain, right      H/O autoimmune hemolytic anemia      H/O hemolytic anemia      Status post left hip replacement      H/O heart transplant  2/2018          Vitals:  T(F): 98.6 (03-04), Max: 100.1 (03-03)  HR: 110 (03-04) (92 - 142)  BP: 107/77 (03-04) (87/71 - 128/69)  RR: 17 (03-04)  SpO2: 92% (03-04)  I&O's Summary      Physical Exam:  Appearance: No acute distress; well appearing  Eyes: PERRL, EOMI, pink conjunctiva  HENT: Normal oral mucosa  Cardiovascular: tachycaradic, S1, S2, no murmurs, rubs, or gallops; no edema; no JVD  Respiratory: crackles throughout  Gastrointestinal: soft, non-tender, non-distended with normal bowel sounds  Musculoskeletal: No clubbing; no joint deformity   Neurologic: Non-focal  Lymphatic: No lymphadenopathy  Psychiatry: AAOx3, mood & affect appropriate  Skin: No rashes, ecchymoses, or cyanosis                          11.1   6.24  )-----------( 109      ( 04 Mar 2023 00:46 )             34.2     03-04    136  |  103  |  40<H>  ----------------------------<  438<H>  4.8   |  19<L>  |  1.85<H>    Ca    7.7<L>      04 Mar 2023 00:46  Mg     1.7     03-04    TPro  5.1<L>  /  Alb  2.4<L>  /  TBili  1.3<H>  /  DBili  x   /  AST  32  /  ALT  24  /  AlkPhos  53  03-04          Serum Pro-Brain Natriuretic Peptide: 3349 pg/mL (03-03 @ 15:09)

## 2023-03-04 NOTE — CONSULT NOTE ADULT - SUBJECTIVE AND OBJECTIVE BOX
ENDOCRINE INITIAL CONSULT - diabetes    HPI:  71 year old male with PMH of HTN, HLD, NICM, chronic systolic heart failure s/p HM2 LVAD (6/2017) s/p heart transplant from Hep. C donor (treated) 2/23/18 (post op course complicated by graft dysfunction treated by plasmapheresis, IVIG, and rituximab), presenting w/ chest tightness x1d. Reports tightness started suddenly 1 day ago. Patient is a poor historian and is unable to elaborate much further. Denies associated symptoms - no SOB, palpitations, arm/jaw pain, nausea, vomiting, orthopnea, PND. Has a non-productive cough that he reports has been going on for a while. Denies fevers, congestion, constitutional symptoms, sick contacts. No new limits on exercise tolerance - at baseline is able to walk 2 blocks before becoming dyspneic.     Admitted 2/17 for routine LHC and endomyocardial biopsy; coronaries patent. Admitted Jan 2023 for Hgb 6.5 secondary to AIHA; treated w/ 4d IV dexamethasone.     ED vitals: 120/82, , RR 24, afebrile  ED course: Gave home dose of Metoprolol succinate 200mg for tachycardia. EP consulted, transplant cards consulted, recommended continuing metoprolol 200mg and starting diltiazem gtt. CT C/A/P done, given vanc/zosyn x1, Bcx sent.  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  Last HbA1c: 4.7  Endocrinologist:   Home DM Meds:  Adherence:  Microvascular complications: Renal, opthalmologic, neuropathy  Macrovascular complications:  SMBG:  Symptoms:  Hypoglycemia episodes:  BG at home:  Diet at home:  Appetite in hospital:  Exercise:  PMHx:  PSHx:  Family hx:  Social hx:  Insurance:  Resides in:      PAST MEDICAL & SURGICAL HISTORY:  HTN      SVT (Supraventricular Tachycardia)      Non-Ischemic Cardiomyopathy  now s/p transplant 2018      GIB (gastrointestinal bleeding)      Hepatitis C virus      DVT of upper extremity (deep vein thrombosis)      Former smoker      HLD (hyperlipidemia)      Knee pain, right      H/O autoimmune hemolytic anemia      H/O hemolytic anemia      Status post left hip replacement      H/O heart transplant  2/2018          FAMILY HISTORY:      Social History:  Lives with his brother; ambulates w/ cane  Denies alcohol use, smoking, drug use (04 Mar 2023 02:05)      Home Medications:  aspirin 81 mg oral tablet:  (04 Mar 2023 03:20)  Bactrim 400 mg-80 mg oral tablet: 1 tab(s) orally once a day (04 Mar 2023 03:20)  febuxostat 40 mg oral tablet: 1 tab(s) orally once a day (04 Mar 2023 03:20)  folic acid 1 mg oral tablet: 1 tab(s) orally once a day (04 Mar 2023 03:20)  losartan 100 mg oral tablet: 1 tab(s) orally once a day (04 Mar 2023 03:20)  metoprolol succinate 200 mg oral tablet, extended release: 1 tab(s) orally once a day (04 Mar 2023 03:20)  pantoprazole 40 mg oral delayed release tablet: 1 tab(s) orally once a day (04 Mar 2023 03:20)  pravastatin 20 mg oral tablet: 1 tab(s) orally once a day (04 Mar 2023 03:20)  valGANciclovir 450 mg oral tablet: 2 tab(s) orally once a day (04 Mar 2023 03:20)      MEDICATIONS  (STANDING):  apixaban 5 milliGRAM(s) Oral every 12 hours  aspirin enteric coated 81 milliGRAM(s) Oral daily  atorvastatin 10 milliGRAM(s) Oral at bedtime  cefepime   IVPB 2000 milliGRAM(s) IV Intermittent every 12 hours  cefepime   IVPB      dextrose 50% Injectable 25 Gram(s) IV Push once  dextrose 50% Injectable 12.5 Gram(s) IV Push once  dextrose 50% Injectable 25 Gram(s) IV Push once  dextrose Oral Gel 15 Gram(s) Oral once  folic acid 1 milliGRAM(s) Oral daily  glucagon  Injectable 1 milliGRAM(s) IntraMuscular once  insulin lispro (ADMELOG) corrective regimen sliding scale   SubCutaneous three times a day before meals  insulin lispro (ADMELOG) corrective regimen sliding scale   SubCutaneous at bedtime  metoprolol succinate  milliGRAM(s) Oral daily  pantoprazole    Tablet 40 milliGRAM(s) Oral before breakfast  predniSONE   Tablet 75 milliGRAM(s) Oral daily  tacrolimus 5 milliGRAM(s) Oral two times a day  valGANciclovir 450 milliGRAM(s) Oral daily  vancomycin  IVPB        MEDICATIONS  (PRN):  acetaminophen     Tablet .. 650 milliGRAM(s) Oral every 6 hours PRN Temp greater or equal to 38C (100.4F), Mild Pain (1 - 3)  melatonin 3 milliGRAM(s) Oral at bedtime PRN Insomnia      Allergies    No Known Allergies    Intolerances        REVIEW OF SYSTEMS  Constitutional: No fever  Eyes: No blurry vision  Neuro: No tremors  HEENT: No pain  Cardiovascular: No chest pain, palpitations  Respiratory: No SOB, no cough  GI: No nausea, vomiting, abdominal pain  : No dysuria  Skin: no rash  Psych: no depression  Endocrine: no polyuria, polydipsia  Hem/lymph: no swelling  Osteoporosis: no fractures  ALL OTHER SYSTEMS REVIEWED AND NEGATIVE     UNABLE TO OBTAIN     PHYSICAL EXAM   Vital Signs Last 24 Hrs  T(C): 37 (04 Mar 2023 04:32), Max: 37.8 (03 Mar 2023 17:46)  T(F): 98.6 (04 Mar 2023 04:32), Max: 100.1 (03 Mar 2023 17:46)  HR: 110 (04 Mar 2023 06:45) (92 - 142)  BP: 107/77 (04 Mar 2023 04:32) (87/71 - 128/69)  BP(mean): 71 (03 Mar 2023 23:03) (71 - 83)  RR: 17 (04 Mar 2023 04:32) (17 - 26)  SpO2: 92% (04 Mar 2023 04:32) (92% - 98%)    Parameters below as of 04 Mar 2023 04:32  Patient On (Oxygen Delivery Method): room air      GENERAL: NAD, well-groomed, well-developed  EYES: No proptosis, no lid lag, anicteric  HEENT:  Atraumatic, Normocephalic, moist mucous membranes  THYROID: Normal size, no palpable nodules  RESPIRATORY: Clear to auscultation bilaterally; No rales, rhonchi, wheezing  CARDIOVASCULAR: Regular rate and rhythm; No murmurs; no peripheral edema  GI: Soft, nontender, non distended, normal bowel sounds  SKIN: Dry, intact, No rashes or lesions  MUSCULOSKELETAL: Full range of motion, normal strength  NEURO: sensation intact, extraocular movements intact, no tremor  PSYCH: Alert and oriented x 3, normal affect, normal mood  CUSHING'S SIGNS: no striae    CAPILLARY BLOOD GLUCOSE      POCT Blood Glucose.: 264 mg/dL (04 Mar 2023 07:22)  POCT Blood Glucose.: 447 mg/dL (04 Mar 2023 02:33)  POCT Blood Glucose.: 458 mg/dL (03 Mar 2023 19:31)  POCT Blood Glucose.: 495 mg/dL (03 Mar 2023 19:29)      A1C with Estimated Average Glucose Result: 4.7 % (02-17-23 @ 11:59)                            11.1   6.24  )-----------( 109      ( 04 Mar 2023 00:46 )             34.2       03-04    136  |  103  |  40<H>  ----------------------------<  438<H>  4.8   |  19<L>  |  1.85<H>    Ca    7.7<L>      04 Mar 2023 00:46  Mg     1.7     03-04    TPro  5.1<L>  /  Alb  2.4<L>  /  TBili  1.3<H>  /  DBili  x   /  AST  32  /  ALT  24  /  AlkPhos  53  03-04      Thyroid Stimulating Hormone, Serum: 1.06 uIU/mL (03-03-23 @ 15:09)      LIPIDS    RADIOLOGY ENDOCRINE INITIAL CONSULT - diabetes    HPI:  71 year old male with PMH of HTN, HLD, NICM, chronic systolic heart failure s/p HM2 LVAD (6/2017) s/p heart transplant from Hep. C donor (treated) 2/23/18 (post op course complicated by graft dysfunction treated by plasmapheresis, IVIG, and rituximab), presenting w/ chest tightness x1d. Reports tightness started suddenly 1 day ago. Patient is a poor historian and is unable to elaborate much further. Denies associated symptoms - no SOB, palpitations, arm/jaw pain, nausea, vomiting, orthopnea, PND. Has a non-productive cough that he reports has been going on for a while. Denies fevers, congestion, constitutional symptoms, sick contacts. No new limits on exercise tolerance - at baseline is able to walk 2 blocks before becoming dyspneic.     Admitted 2/17 for routine LHC and endomyocardial biopsy; coronaries patent. Admitted Jan 2023 for Hgb 6.5 secondary to AIHA; treated w/ 4d IV dexamethasone.     ED vitals: 120/82, , RR 24, afebrile  ED course: Gave home dose of Metoprolol succinate 200mg for tachycardia. EP consulted, transplant cards consulted, recommended continuing metoprolol 200mg and starting diltiazem gtt. CT C/A/P done, given vanc/zosyn x1, Bcx sent.  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, patient denies history of diabetes  Last HbA1c: 4.7  Endocrinologist: does not have  Home DM Meds: denies taking medication for diabetes  Microvascular complications: denies  Macrovascular complications: has NICM, denies MI or CVA  SMBG: does not check  Symptoms: endorses polyuria, polydipsia, denies neuropathy  Diet at home:  - breakfast: fish, gritz  - lunch: spaghetti  - dinner: "does not eat much"  - occasionally cookies and juice  Appetite in hospital: ok  Exercise: walks  PMHx: as above  PSHx: as above  Family hx: denies family history of diabetes or thyroid disease  Social hx: denies tobacco use, alcohol use or recreational drug use  Insurance:  Resides in:      PAST MEDICAL & SURGICAL HISTORY:  HTN      SVT (Supraventricular Tachycardia)      Non-Ischemic Cardiomyopathy  now s/p transplant 2018      GIB (gastrointestinal bleeding)      Hepatitis C virus      DVT of upper extremity (deep vein thrombosis)      Former smoker      HLD (hyperlipidemia)      Knee pain, right      H/O autoimmune hemolytic anemia      H/O hemolytic anemia      Status post left hip replacement      H/O heart transplant  2/2018          FAMILY HISTORY:      Social History:  Lives with his brother; ambulates w/ cane  Denies alcohol use, smoking, drug use (04 Mar 2023 02:05)      Home Medications:  aspirin 81 mg oral tablet:  (04 Mar 2023 03:20)  Bactrim 400 mg-80 mg oral tablet: 1 tab(s) orally once a day (04 Mar 2023 03:20)  febuxostat 40 mg oral tablet: 1 tab(s) orally once a day (04 Mar 2023 03:20)  folic acid 1 mg oral tablet: 1 tab(s) orally once a day (04 Mar 2023 03:20)  losartan 100 mg oral tablet: 1 tab(s) orally once a day (04 Mar 2023 03:20)  metoprolol succinate 200 mg oral tablet, extended release: 1 tab(s) orally once a day (04 Mar 2023 03:20)  pantoprazole 40 mg oral delayed release tablet: 1 tab(s) orally once a day (04 Mar 2023 03:20)  pravastatin 20 mg oral tablet: 1 tab(s) orally once a day (04 Mar 2023 03:20)  valGANciclovir 450 mg oral tablet: 2 tab(s) orally once a day (04 Mar 2023 03:20)      MEDICATIONS  (STANDING):  apixaban 5 milliGRAM(s) Oral every 12 hours  aspirin enteric coated 81 milliGRAM(s) Oral daily  atorvastatin 10 milliGRAM(s) Oral at bedtime  cefepime   IVPB 2000 milliGRAM(s) IV Intermittent every 12 hours  cefepime   IVPB      dextrose 50% Injectable 25 Gram(s) IV Push once  dextrose 50% Injectable 12.5 Gram(s) IV Push once  dextrose 50% Injectable 25 Gram(s) IV Push once  dextrose Oral Gel 15 Gram(s) Oral once  folic acid 1 milliGRAM(s) Oral daily  glucagon  Injectable 1 milliGRAM(s) IntraMuscular once  insulin lispro (ADMELOG) corrective regimen sliding scale   SubCutaneous three times a day before meals  insulin lispro (ADMELOG) corrective regimen sliding scale   SubCutaneous at bedtime  metoprolol succinate  milliGRAM(s) Oral daily  pantoprazole    Tablet 40 milliGRAM(s) Oral before breakfast  predniSONE   Tablet 75 milliGRAM(s) Oral daily  tacrolimus 5 milliGRAM(s) Oral two times a day  valGANciclovir 450 milliGRAM(s) Oral daily  vancomycin  IVPB        MEDICATIONS  (PRN):  acetaminophen     Tablet .. 650 milliGRAM(s) Oral every 6 hours PRN Temp greater or equal to 38C (100.4F), Mild Pain (1 - 3)  melatonin 3 milliGRAM(s) Oral at bedtime PRN Insomnia      Allergies    No Known Allergies    Intolerances        REVIEW OF SYSTEMS  Constitutional: No fever  Eyes: No blurry vision  Neuro: No tremors  HEENT: No pain  Cardiovascular: No chest pain, palpitations  Respiratory: No SOB, no cough  GI: No nausea, vomiting, abdominal pain  : No dysuria  Skin: no rash  Psych: no depression  Endocrine: no polyuria, polydipsia  Hem/lymph: no swelling  Osteoporosis: no fractures  ALL OTHER SYSTEMS REVIEWED AND NEGATIVE     UNABLE TO OBTAIN     PHYSICAL EXAM   Vital Signs Last 24 Hrs  T(C): 37 (04 Mar 2023 04:32), Max: 37.8 (03 Mar 2023 17:46)  T(F): 98.6 (04 Mar 2023 04:32), Max: 100.1 (03 Mar 2023 17:46)  HR: 110 (04 Mar 2023 06:45) (92 - 142)  BP: 107/77 (04 Mar 2023 04:32) (87/71 - 128/69)  BP(mean): 71 (03 Mar 2023 23:03) (71 - 83)  RR: 17 (04 Mar 2023 04:32) (17 - 26)  SpO2: 92% (04 Mar 2023 04:32) (92% - 98%)    Parameters below as of 04 Mar 2023 04:32  Patient On (Oxygen Delivery Method): room air      GENERAL: NAD, well-groomed, well-developed  EYES: No proptosis, no lid lag, anicteric  HEENT:  Atraumatic, Normocephalic, moist mucous membranes  THYROID: Normal size, no palpable nodules  RESPIRATORY: Clear to auscultation bilaterally; No rales, rhonchi, wheezing  CARDIOVASCULAR: Regular rate and rhythm; No murmurs; no peripheral edema  GI: Soft, nontender, non distended, normal bowel sounds  SKIN: Dry, intact, No rashes or lesions  MUSCULOSKELETAL: Full range of motion, normal strength  NEURO: sensation intact, extraocular movements intact, no tremor  PSYCH: Alert and oriented x 3, normal affect, normal mood  CUSHING'S SIGNS: no striae    CAPILLARY BLOOD GLUCOSE      POCT Blood Glucose.: 264 mg/dL (04 Mar 2023 07:22)  POCT Blood Glucose.: 447 mg/dL (04 Mar 2023 02:33)  POCT Blood Glucose.: 458 mg/dL (03 Mar 2023 19:31)  POCT Blood Glucose.: 495 mg/dL (03 Mar 2023 19:29)      A1C with Estimated Average Glucose Result: 4.7 % (02-17-23 @ 11:59)                            11.1   6.24  )-----------( 109      ( 04 Mar 2023 00:46 )             34.2       03-04    136  |  103  |  40<H>  ----------------------------<  438<H>  4.8   |  19<L>  |  1.85<H>    Ca    7.7<L>      04 Mar 2023 00:46  Mg     1.7     03-04    TPro  5.1<L>  /  Alb  2.4<L>  /  TBili  1.3<H>  /  DBili  x   /  AST  32  /  ALT  24  /  AlkPhos  53  03-04      Thyroid Stimulating Hormone, Serum: 1.06 uIU/mL (03-03-23 @ 15:09)      LIPIDS    RADIOLOGY ENDOCRINE INITIAL CONSULT - diabetes    HPI:  71 year old male with PMH of HTN, HLD, NICM, chronic systolic heart failure s/p HM2 LVAD (6/2017) s/p heart transplant from Hep. C donor (treated) 2/23/18 (post op course complicated by graft dysfunction treated by plasmapheresis, IVIG, and rituximab), presenting w/ chest tightness x1d. Reports tightness started suddenly 1 day ago. Patient is a poor historian and is unable to elaborate much further. Denies associated symptoms - no SOB, palpitations, arm/jaw pain, nausea, vomiting, orthopnea, PND. Has a non-productive cough that he reports has been going on for a while. Denies fevers, congestion, constitutional symptoms, sick contacts. No new limits on exercise tolerance - at baseline is able to walk 2 blocks before becoming dyspneic.     Admitted 2/17 for routine LHC and endomyocardial biopsy; coronaries patent. Admitted Jan 2023 for Hgb 6.5 secondary to AIHA; treated w/ 4d IV dexamethasone.     ED vitals: 120/82, , RR 24, afebrile  ED course: Gave home dose of Metoprolol succinate 200mg for tachycardia. EP consulted, transplant cards consulted, recommended continuing metoprolol 200mg and starting diltiazem gtt. CT C/A/P done, given vanc/zosyn x1, Bcx sent.  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, patient denies history of diabetes  Last HbA1c: 4.7  Endocrinologist: does not have  Home DM Meds: denies taking medication for diabetes  Microvascular complications: denies  Macrovascular complications: has NICM, denies MI or CVA  SMBG: does not check  Symptoms: endorses polyuria, polydipsia, denies neuropathy  Diet at home:  - breakfast: fish, gritz  - lunch: spaghetti  - dinner: "does not eat much"  - occasionally cookies and juice  Appetite in hospital: ok  Exercise: walks  PMHx: as above  PSHx: as above  Family hx: denies family history of diabetes or thyroid disease  Social hx: denies tobacco use, alcohol use or recreational drug use      PAST MEDICAL & SURGICAL HISTORY:  HTN      SVT (Supraventricular Tachycardia)      Non-Ischemic Cardiomyopathy  now s/p transplant 2018      GIB (gastrointestinal bleeding)      Hepatitis C virus      DVT of upper extremity (deep vein thrombosis)      Former smoker      HLD (hyperlipidemia)      Knee pain, right      H/O autoimmune hemolytic anemia      H/O hemolytic anemia      Status post left hip replacement      H/O heart transplant  2/2018          FAMILY HISTORY:      Social History:  Lives with his brother; ambulates w/ cane  Denies alcohol use, smoking, drug use (04 Mar 2023 02:05)      Home Medications:  aspirin 81 mg oral tablet:  (04 Mar 2023 03:20)  Bactrim 400 mg-80 mg oral tablet: 1 tab(s) orally once a day (04 Mar 2023 03:20)  febuxostat 40 mg oral tablet: 1 tab(s) orally once a day (04 Mar 2023 03:20)  folic acid 1 mg oral tablet: 1 tab(s) orally once a day (04 Mar 2023 03:20)  losartan 100 mg oral tablet: 1 tab(s) orally once a day (04 Mar 2023 03:20)  metoprolol succinate 200 mg oral tablet, extended release: 1 tab(s) orally once a day (04 Mar 2023 03:20)  pantoprazole 40 mg oral delayed release tablet: 1 tab(s) orally once a day (04 Mar 2023 03:20)  pravastatin 20 mg oral tablet: 1 tab(s) orally once a day (04 Mar 2023 03:20)  valGANciclovir 450 mg oral tablet: 2 tab(s) orally once a day (04 Mar 2023 03:20)      MEDICATIONS  (STANDING):  apixaban 5 milliGRAM(s) Oral every 12 hours  aspirin enteric coated 81 milliGRAM(s) Oral daily  atorvastatin 10 milliGRAM(s) Oral at bedtime  cefepime   IVPB 2000 milliGRAM(s) IV Intermittent every 12 hours  cefepime   IVPB      dextrose 50% Injectable 25 Gram(s) IV Push once  dextrose 50% Injectable 12.5 Gram(s) IV Push once  dextrose 50% Injectable 25 Gram(s) IV Push once  dextrose Oral Gel 15 Gram(s) Oral once  folic acid 1 milliGRAM(s) Oral daily  glucagon  Injectable 1 milliGRAM(s) IntraMuscular once  insulin lispro (ADMELOG) corrective regimen sliding scale   SubCutaneous three times a day before meals  insulin lispro (ADMELOG) corrective regimen sliding scale   SubCutaneous at bedtime  metoprolol succinate  milliGRAM(s) Oral daily  pantoprazole    Tablet 40 milliGRAM(s) Oral before breakfast  predniSONE   Tablet 75 milliGRAM(s) Oral daily  tacrolimus 5 milliGRAM(s) Oral two times a day  valGANciclovir 450 milliGRAM(s) Oral daily  vancomycin  IVPB        MEDICATIONS  (PRN):  acetaminophen     Tablet .. 650 milliGRAM(s) Oral every 6 hours PRN Temp greater or equal to 38C (100.4F), Mild Pain (1 - 3)  melatonin 3 milliGRAM(s) Oral at bedtime PRN Insomnia      Allergies    No Known Allergies    Intolerances        REVIEW OF SYSTEMS  Constitutional: No fever  Eyes: endorses blurry vision  Neuro: No tremors  HEENT: No pain  Cardiovascular: No chest pain, palpitations  Respiratory: No SOB, no cough  GI: No nausea, vomiting, abdominal pain  : No dysuria  Skin: no rash  Psych: no depression  Endocrine: endorses polyuria, polydipsia  Hem/lymph: no swelling  Osteoporosis: no fractures  ALL OTHER SYSTEMS REVIEWED AND NEGATIVE     PHYSICAL EXAM   Vital Signs Last 24 Hrs  T(C): 37 (04 Mar 2023 04:32), Max: 37.8 (03 Mar 2023 17:46)  T(F): 98.6 (04 Mar 2023 04:32), Max: 100.1 (03 Mar 2023 17:46)  HR: 110 (04 Mar 2023 06:45) (92 - 142)  BP: 107/77 (04 Mar 2023 04:32) (87/71 - 128/69)  BP(mean): 71 (03 Mar 2023 23:03) (71 - 83)  RR: 17 (04 Mar 2023 04:32) (17 - 26)  SpO2: 92% (04 Mar 2023 04:32) (92% - 98%)    Parameters below as of 04 Mar 2023 04:32  Patient On (Oxygen Delivery Method): room air      GENERAL: tired appearing, lying in bed  EYES: No proptosis, no lid lag, anicteric  HEENT:  Atraumatic, Normocephalic, dry mucous membranes  THYROID: Normal size, no palpable nodules  RESPIRATORY: non labored breathing, on room air  CARDIOVASCULAR: did not appear cyanotic, no peripheral edema  GI: Soft, nontender, mildly distended,  SKIN: Dry, intact, No rashes or lesions  MUSCULOSKELETAL: Full range of motion, moving extremities spontaneously  NEURO: sensation intact on plantar surface of feet, extraocular movements intact, no tremor  PSYCH: Answering questions appropriately, normal affect, normal mood  CUSHING'S SIGNS: no acanthosis, no dorsocervical fat pad    CAPILLARY BLOOD GLUCOSE      POCT Blood Glucose.: 264 mg/dL (04 Mar 2023 07:22)  POCT Blood Glucose.: 447 mg/dL (04 Mar 2023 02:33)  POCT Blood Glucose.: 458 mg/dL (03 Mar 2023 19:31)  POCT Blood Glucose.: 495 mg/dL (03 Mar 2023 19:29)      A1C with Estimated Average Glucose Result: 4.7 % (02-17-23 @ 11:59)                            11.1   6.24  )-----------( 109      ( 04 Mar 2023 00:46 )             34.2       03-04    136  |  103  |  40<H>  ----------------------------<  438<H>  4.8   |  19<L>  |  1.85<H>    Ca    7.7<L>      04 Mar 2023 00:46  Mg     1.7     03-04    TPro  5.1<L>  /  Alb  2.4<L>  /  TBili  1.3<H>  /  DBili  x   /  AST  32  /  ALT  24  /  AlkPhos  53  03-04      Thyroid Stimulating Hormone, Serum: 1.06 uIU/mL (03-03-23 @ 15:09)      LIPIDS    RADIOLOGY ENDOCRINE INITIAL CONSULT - diabetes    HPI:  72 year old male with PMH of HTN, HLD, NICM, chronic systolic heart failure s/p HM2 LVAD (6/2017) s/p heart transplant from Hep. C donor (treated) 2/23/18 (post op course complicated by graft dysfunction treated by plasmapheresis, IVIG, and rituximab), presenting w/ chest tightness x1d. Reports tightness started suddenly 1 day ago. Patient is a poor historian and is unable to elaborate much further. Denies associated symptoms - no SOB, palpitations, arm/jaw pain, nausea, vomiting, orthopnea, PND. Has a non-productive cough that he reports has been going on for a while. Denies fevers, congestion, constitutional symptoms, sick contacts. No new limits on exercise tolerance - at baseline is able to walk 2 blocks before becoming dyspneic.     Admitted 2/17 for routine LHC and endomyocardial biopsy; coronaries patent. Admitted Jan 2023 for Hgb 6.5 secondary to AIHA; treated w/ 4d IV dexamethasone.     ED vitals: 120/82, , RR 24, afebrile  ED course: Gave home dose of Metoprolol succinate 200mg for tachycardia. EP consulted, transplant cards consulted, recommended continuing metoprolol 200mg and starting diltiazem gtt. CT C/A/P done, given vanc/zosyn x1, Bcx sent.  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, patient denies history of diabetes  Last HbA1c: 4.7  Endocrinologist: does not have  Home DM Meds: denies taking medication for diabetes  Microvascular complications: denies  Macrovascular complications: has NICM, denies MI or CVA  SMBG: does not check  Symptoms: endorses polyuria, polydipsia, denies neuropathy  Diet at home:  - breakfast: fish, gritz  - lunch: spaghetti  - dinner: "does not eat much"  - occasionally cookies and juice  Appetite in hospital: ok  Exercise: walks  PMHx: as above  PSHx: as above  Family hx: denies family history of diabetes or thyroid disease  Social hx: denies tobacco use, alcohol use or recreational drug use      PAST MEDICAL & SURGICAL HISTORY:  HTN      SVT (Supraventricular Tachycardia)      Non-Ischemic Cardiomyopathy  now s/p transplant 2018      GIB (gastrointestinal bleeding)      Hepatitis C virus      DVT of upper extremity (deep vein thrombosis)      Former smoker      HLD (hyperlipidemia)      Knee pain, right      H/O autoimmune hemolytic anemia      H/O hemolytic anemia      Status post left hip replacement      H/O heart transplant  2/2018          FAMILY HISTORY:      Social History:  Lives with his brother; ambulates w/ cane  Denies alcohol use, smoking, drug use (04 Mar 2023 02:05)      Home Medications:  aspirin 81 mg oral tablet:  (04 Mar 2023 03:20)  Bactrim 400 mg-80 mg oral tablet: 1 tab(s) orally once a day (04 Mar 2023 03:20)  febuxostat 40 mg oral tablet: 1 tab(s) orally once a day (04 Mar 2023 03:20)  folic acid 1 mg oral tablet: 1 tab(s) orally once a day (04 Mar 2023 03:20)  losartan 100 mg oral tablet: 1 tab(s) orally once a day (04 Mar 2023 03:20)  metoprolol succinate 200 mg oral tablet, extended release: 1 tab(s) orally once a day (04 Mar 2023 03:20)  pantoprazole 40 mg oral delayed release tablet: 1 tab(s) orally once a day (04 Mar 2023 03:20)  pravastatin 20 mg oral tablet: 1 tab(s) orally once a day (04 Mar 2023 03:20)  valGANciclovir 450 mg oral tablet: 2 tab(s) orally once a day (04 Mar 2023 03:20)      MEDICATIONS  (STANDING):  apixaban 5 milliGRAM(s) Oral every 12 hours  aspirin enteric coated 81 milliGRAM(s) Oral daily  atorvastatin 10 milliGRAM(s) Oral at bedtime  cefepime   IVPB 2000 milliGRAM(s) IV Intermittent every 12 hours  cefepime   IVPB      dextrose 50% Injectable 25 Gram(s) IV Push once  dextrose 50% Injectable 12.5 Gram(s) IV Push once  dextrose 50% Injectable 25 Gram(s) IV Push once  dextrose Oral Gel 15 Gram(s) Oral once  folic acid 1 milliGRAM(s) Oral daily  glucagon  Injectable 1 milliGRAM(s) IntraMuscular once  insulin lispro (ADMELOG) corrective regimen sliding scale   SubCutaneous three times a day before meals  insulin lispro (ADMELOG) corrective regimen sliding scale   SubCutaneous at bedtime  metoprolol succinate  milliGRAM(s) Oral daily  pantoprazole    Tablet 40 milliGRAM(s) Oral before breakfast  predniSONE   Tablet 75 milliGRAM(s) Oral daily  tacrolimus 5 milliGRAM(s) Oral two times a day  valGANciclovir 450 milliGRAM(s) Oral daily  vancomycin  IVPB        MEDICATIONS  (PRN):  acetaminophen     Tablet .. 650 milliGRAM(s) Oral every 6 hours PRN Temp greater or equal to 38C (100.4F), Mild Pain (1 - 3)  melatonin 3 milliGRAM(s) Oral at bedtime PRN Insomnia      Allergies    No Known Allergies    Intolerances        REVIEW OF SYSTEMS  Constitutional: No fever  Eyes: endorses blurry vision  Neuro: No tremors  HEENT: No pain  Cardiovascular: No chest pain, palpitations  Respiratory: No SOB, no cough  GI: No nausea, vomiting, abdominal pain  : No dysuria  Skin: no rash  Psych: no depression  Endocrine: endorses polyuria, polydipsia  Hem/lymph: no swelling  Osteoporosis: no fractures  ALL OTHER SYSTEMS REVIEWED AND NEGATIVE     PHYSICAL EXAM   Vital Signs Last 24 Hrs  T(C): 37 (04 Mar 2023 04:32), Max: 37.8 (03 Mar 2023 17:46)  T(F): 98.6 (04 Mar 2023 04:32), Max: 100.1 (03 Mar 2023 17:46)  HR: 110 (04 Mar 2023 06:45) (92 - 142)  BP: 107/77 (04 Mar 2023 04:32) (87/71 - 128/69)  BP(mean): 71 (03 Mar 2023 23:03) (71 - 83)  RR: 17 (04 Mar 2023 04:32) (17 - 26)  SpO2: 92% (04 Mar 2023 04:32) (92% - 98%)    Parameters below as of 04 Mar 2023 04:32  Patient On (Oxygen Delivery Method): room air      GENERAL: tired appearing, lying in bed  EYES: No proptosis, no lid lag, anicteric  HEENT:  Atraumatic, Normocephalic, dry mucous membranes  THYROID: Normal size, no palpable nodules  RESPIRATORY: non labored breathing, on room air  CARDIOVASCULAR: did not appear cyanotic, no peripheral edema  GI: Soft, nontender, mildly distended, normal bowel sounds  SKIN: Dry, intact, No rashes or lesions  MUSCULOSKELETAL: Full range of motion, moving extremities spontaneously  NEURO: sensation intact on plantar surface of feet, extraocular movements intact, no tremor  PSYCH: Answering questions appropriately, normal affect, normal mood  CUSHING'S SIGNS: no acanthosis, no dorsocervical fat pad    CAPILLARY BLOOD GLUCOSE      POCT Blood Glucose.: 264 mg/dL (04 Mar 2023 07:22)  POCT Blood Glucose.: 447 mg/dL (04 Mar 2023 02:33)  POCT Blood Glucose.: 458 mg/dL (03 Mar 2023 19:31)  POCT Blood Glucose.: 495 mg/dL (03 Mar 2023 19:29)      A1C with Estimated Average Glucose Result: 4.7 % (02-17-23 @ 11:59)                            11.1   6.24  )-----------( 109      ( 04 Mar 2023 00:46 )             34.2       03-04    136  |  103  |  40<H>  ----------------------------<  438<H>  4.8   |  19<L>  |  1.85<H>    Ca    7.7<L>      04 Mar 2023 00:46  Mg     1.7     03-04    TPro  5.1<L>  /  Alb  2.4<L>  /  TBili  1.3<H>  /  DBili  x   /  AST  32  /  ALT  24  /  AlkPhos  53  03-04      Thyroid Stimulating Hormone, Serum: 1.06 uIU/mL (03-03-23 @ 15:09)      LIPIDS    RADIOLOGY

## 2023-03-04 NOTE — CONSULT NOTE ADULT - ATTENDING COMMENTS
Patient seen and examined with Dr Freitas    I agree with his history exam and plans as noted above    Patient well known to me from prior hospitalizations. Post heart transplant. 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 Admitted with acute bronchitis like symptoms, low grade fevers, wheezing and RVP + human metapneumovirus.  Chest Ct scan notable for patchy areas of consolidation    Would:  send blood cultures x2 sets  send sputum for gram stain/cx, fungal stain/cx, AFB stain/cx  fungitell  galactomannan  serum crypt ag  urine legionella ag  urine histo ag  Agree with Vancomycin and cefepime pending above studies  send Vanco trough prior to fourth dose  supportive care for metapneumovirus  IF above not revealing for additional etiology would ask Pulmonary to evaluate for possible bronchoscopy/BAL    Gary Lujan MD  Can be called via Teams  After 5pm/weekends [phone]

## 2023-03-04 NOTE — PROGRESS NOTE ADULT - PROBLEM SELECTOR PLAN 2
- obtain tacro level 30min prior to administration in AM  - con't tacro 5mg BID  - transplant ID consult  - pulm consult for bronch  - restart home bactrim ppx  - con't valganciclovir ppx

## 2023-03-04 NOTE — PROGRESS NOTE ADULT - ATTENDING COMMENTS
Yadira Shine MD  Division of Hospital Medicine  [facility]   Available on Microsoft Teams - messages preferred prior to calls.    Patient seen and examined today with Team 6 Resident and Interns. Agree with above findings, assessment, and plan with the following additions/exceptions:    Overall, 73 yo male with PMH of HTN, HLD, NICM, chronic systolic heart fialure s/p HM2 LVAD (6/2017) s/p OHT (hep C donor, treated) in 2/23/18 with post-op course c/b graft dysfunction treated with plasmapheresis, IVIG, and rituximab, hx of hemolytic anemia on prednisone who presented with chest tightness found to be in aflutter with RVR, sepsis 2/2 bacterial and hMPV pneumonia, and NORMAN.    Active Issues:  #Sepsis criteria met on admission  #Human Metapneumovirus infection  #New onset Aflutter with RVR  #NORMAN on CKD, improved  #Steroid Induced Hyperglycemia  #Acute Metabolic Encephalopathy, resolved  #s/p OHT in 2/23/2018  #Hx hemolytic anemia    Plan:  - found to be in new onset aflutter with RVR on admission, spontaneously to NSR  - c/w Toprol for rate control, eliquis for AC. EP consult appreciated - mayh benefit from ablation potentially   - met sepsis criteria on admission with fever, leukocytosis, elevated procal  - RVP positive for hPMV - supportive care  - CT chest showing impacted RML and RLL bronchi with near complete collapse of RML and RLL, patchy areas of consolidation within RUL and LLL concerning for atelectasis vs. infection  - c/w empiric vanco + cefepime started on 3/4  - UA negative for infection  - check MRSA PCR  - f/u sputum cx, blood cx  - f/u urine legionella Ag, CMV, fungitell  - cryptococcal Ag negative  - c/w prednisone 75mg daily - as per heme, would maintain on this dose as previously had relapse of AIHA when steroids were tapered, as long as ID in agreement  - c/w bactrim and valganciclovir for ppx  - pulm consulted for utility on bronch given impaction and collapse of RML+RLL but recommending outpatient bronch when optimized  - basal/bolus insulin with sliding scale as per Endo recs  - NORMAN improved with Cr back to baseline  - Transplant Cardiology, Transplant ID, EP, Heme/onc, Pulm, and Endo recs appreciated    Rest as detailed in note above.    Plan discussed with patient and Team 1 Intern Dr. [place].

## 2023-03-04 NOTE — PROGRESS NOTE ADULT - ASSESSMENT
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 Patient is s/p RHC several weeks ago and had a cath with patent coronaries. Was found to be in A flutter 140 bpm.   [id number] VAsc score 2,  1. new onset Atrial flutter, patient is a poor historian A&Ox1 at this time   2. s/p heart transplant 2018  3. history of hemolytic anemia    - Tele with spontaneous conversion to sinus rhythm without conversion pause  - Continue metoprolol succinate 200mg qd  - Continue AC with Apixaban 5mg bid  - Diltiazem not started; continue betablocker  - May benefir from ablation  - hemolytic anemia. H/H within normal limits, continue to monitor  - Head Ct without acute ICH or infarction  - Continue telemetry monitoring    #[phone]

## 2023-03-04 NOTE — PROGRESS NOTE ADULT - PROBLEM SELECTOR PLAN 4
- on last admission it was stated by heme to avoid blood transfusion unless he is significantly symptomatic due to risk of hyperhemolysis. Of note if a transfusion is needed it is ok from heart transplant perspective.  - obtain heme/onc consult for hemolytic anemia management and prednisone dosing given concern for oppurtunistic infection

## 2023-03-04 NOTE — PROGRESS NOTE ADULT - ASSESSMENT
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 ER with new onset of chest tightness, found to be in Aflutter/fib. He is now back in sinus tachycardia. Course c/b by URI w/ human metapneumovirus. Would assess for superimposed infection given immunosuppression. Afib/flutter would be concerning for rejection, but repeat echo here with normal LV function.

## 2023-03-04 NOTE — H&P ADULT - PROBLEM SELECTOR PLAN 6
Diet: DASH  DVT: Eliquis 5mg BID Continue home tacrolimus 5mg qd; target level 6-8  - f/u tacro level  Continue prednisone 75mg qd  Prophylaxis:   - Bactrim   - Valganciclovir 450mg 2tabs daily  - Pantoprazole 40mg qd  Off cellcept due to leukopenia, recurrent infections Cr elevated to 2 on admission, down to 1.8 following 1L IVF. Cr elevated to 2 on admission, down to 1.8 following 1L IVF. Considering sepsis mediated ATN vs prerenal NORMAN.   - monitor Cr, renally dose medications  - hold losartan  - urine lytes if Cr stagnant or worsening

## 2023-03-04 NOTE — CONSULT NOTE ADULT - SUBJECTIVE AND OBJECTIVE BOX
HEMATOLOGY ONCOLOGY CONSULT     Patient is a 72y old  Male who presents with a chief complaint of chest tightness (04 Mar 2023 07:41)      HPI:  71 year old male with PMH of HTN, HLD, NICM, chronic systolic heart failure s/p HM2 LVAD (6/2017) s/p heart transplant from Hep. C donor (treated) 2/23/18 (post op course complicated by graft dysfunction treated by plasmapheresis, IVIG, and rituximab), presenting w/ chest tightness x1d. Reports tightness started suddenly 1 day ago. Patient is a poor historian and is unable to elaborate much further. Denies associated symptoms - no SOB, palpitations, arm/jaw pain, nausea, vomiting, orthopnea, PND. Has a non-productive cough that he reports has been going on for a while. Denies fevers, congestion, constitutional symptoms, sick contacts. No new limits on exercise tolerance - at baseline is able to walk 2 blocks before becoming dyspneic.     Admitted 2/17 for routine LHC and endomyocardial biopsy; coronaries patent. Admitted Jan 2023 for Hgb 6.5 secondary to AIHA; treated w/ 4d IV dexamethasone.     ED vitals: 120/82, , RR 24, afebrile  ED course: Gave home dose of Metoprolol succinate 200mg for tachycardia. EP consulted, transplant cards consulted, recommended continuing metoprolol 200mg and starting diltiazem gtt. CT C/A/P done, given vanc/zosyn x1, Bcx sent.  (04 Mar 2023 02:05)       ROS:  Negative except for:    PAST MEDICAL & SURGICAL HISTORY:  HTN      SVT (Supraventricular Tachycardia)      Non-Ischemic Cardiomyopathy  now s/p transplant 2018      GIB (gastrointestinal bleeding)      Hepatitis C virus      DVT of upper extremity (deep vein thrombosis)      Former smoker      HLD (hyperlipidemia)      Knee pain, right      H/O autoimmune hemolytic anemia      H/O hemolytic anemia      Status post left hip replacement      H/O heart transplant  2/2018          SOCIAL HISTORY:    FAMILY HISTORY:      MEDICATIONS  (STANDING):  apixaban 5 milliGRAM(s) Oral every 12 hours  aspirin enteric coated 81 milliGRAM(s) Oral daily  atorvastatin 10 milliGRAM(s) Oral at bedtime  cefepime   IVPB 2000 milliGRAM(s) IV Intermittent every 12 hours  cefepime   IVPB      dextrose 50% Injectable 25 Gram(s) IV Push once  dextrose 50% Injectable 12.5 Gram(s) IV Push once  dextrose 50% Injectable 25 Gram(s) IV Push once  dextrose Oral Gel 15 Gram(s) Oral once  folic acid 1 milliGRAM(s) Oral daily  glucagon  Injectable 1 milliGRAM(s) IntraMuscular once  insulin lispro (ADMELOG) corrective regimen sliding scale   SubCutaneous three times a day before meals  insulin lispro (ADMELOG) corrective regimen sliding scale   SubCutaneous at bedtime  magnesium sulfate  IVPB 2 Gram(s) IV Intermittent once  metoprolol succinate  milliGRAM(s) Oral daily  pantoprazole    Tablet 40 milliGRAM(s) Oral before breakfast  predniSONE   Tablet 75 milliGRAM(s) Oral daily  tacrolimus 5 milliGRAM(s) Oral <User Schedule>  trimethoprim   80 mG/sulfamethoxazole 400 mG 1 Tablet(s) Oral daily  valGANciclovir 450 milliGRAM(s) Oral daily  vancomycin  IVPB        MEDICATIONS  (PRN):  acetaminophen     Tablet .. 650 milliGRAM(s) Oral every 6 hours PRN Temp greater or equal to 38C (100.4F), Mild Pain (1 - 3)  melatonin 3 milliGRAM(s) Oral at bedtime PRN Insomnia      Allergies    No Known Allergies    Intolerances        Vital Signs Last 24 Hrs  T(C): 37 (04 Mar 2023 04:32), Max: 37.8 (03 Mar 2023 17:46)  T(F): 98.6 (04 Mar 2023 04:32), Max: 100.1 (03 Mar 2023 17:46)  HR: 110 (04 Mar 2023 06:45) (92 - 142)  BP: 107/77 (04 Mar 2023 04:32) (87/71 - 128/69)  BP(mean): 71 (03 Mar 2023 23:03) (71 - 83)  RR: 17 (04 Mar 2023 04:32) (17 - 26)  SpO2: 92% (04 Mar 2023 04:32) (92% - 98%)    Parameters below as of 04 Mar 2023 04:32  Patient On (Oxygen Delivery Method): room air        PHYSICAL EXAM  General: adult in NAD  HEENT: clear oropharynx, anicteric sclera, pink conjunctiva  Neck: supple  CV: normal S1/S2 with no murmur rubs or gallops  Lungs: positive air movement b/l ant lungs,clear to auscultation, no wheezes, no rales  Abdomen: soft non-tender non-distended, no hepatosplenomegaly  Ext: no clubbing cyanosis or edema  Skin: no rashes and no petechiae  Neuro: alert and oriented X 4, no focal deficits      LABS:                          11.1   6.24  )-----------( 109      ( 04 Mar 2023 00:46 )             34.2         Mean Cell Volume : 102.7 fl  Mean Cell Hemoglobin : 33.3 pg  Mean Cell Hemoglobin Concentration : 32.5 gm/dL  Auto Neutrophil # : 5.33 K/uL  Auto Lymphocyte # : 0.61 K/uL  Auto Monocyte # : 0.22 K/uL  Auto Eosinophil # : 0.01 K/uL  Auto Basophil # : 0.01 K/uL  Auto Neutrophil % : 85.3 %  Auto Lymphocyte % : 9.8 %  Auto Monocyte % : 3.5 %  Auto Eosinophil % : 0.2 %  Auto Basophil % : 0.2 %      03-04    136  |  103  |  40<H>  ----------------------------<  438<H>  4.8   |  19<L>  |  1.85<H>    Ca    7.7<L>      04 Mar 2023 00:46  Mg     1.7     03-04    TPro  5.1<L>  /  Alb  2.4<L>  /  TBili  1.3<H>  /  DBili  x   /  AST  32  /  ALT  24  /  AlkPhos  53  03-04                      BLOOD SMEAR INTERPRETATION:       RADIOLOGY & ADDITIONAL STUDIES:       HEMATOLOGY ONCOLOGY CONSULT     Patient is a 72y old  Male who presents with a chief complaint of chest tightness (04 Mar 2023 07:41)      HPI:  71 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 Rosario, on prednisone 75mg d/t resistant disease)   HTN, HLD, NICM, chronic systolic heart failure s/p HM2 LVAD (6/2017) s/p heart transplant from Hep. C donor (treated) 2/23/18 (post op course complicated by graft dysfunction treated by plasmapheresis, IVIG, and rituximab), presenting w/ chest tightness x1d. Reports tightness started suddenly 1 day ago. Patient is a poor historian and is unable to elaborate much further. Denies associated symptoms - no SOB, palpitations, arm/jaw pain, nausea, vomiting, orthopnea, PND. Has a non-productive cough that he reports has been going on for a while. Denies fevers, congestion, constitutional symptoms, sick contacts. No new limits on exercise tolerance - at baseline is able to walk 2 blocks before becoming dyspneic.     Admitted 2/17 for routine LHC and endomyocardial biopsy; coronaries patent. Admitted Jan 2023 for Hgb 6.5 secondary to AIHA; treated w/ 4d IV dexamethasone.     ED vitals: 120/82, , RR 24, afebrile  ED course: Gave home dose of Metoprolol succinate 200mg for tachycardia. EP consulted, transplant cards consulted, recommended continuing metoprolol 200mg and starting diltiazem gtt. CT C/A/P done, given vanc/zosyn x1, Bcx sent.  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   Lab with Hb 11.1, WBC 6.2 and  (stable)  CMP with NORMAN 1.8 and Bili of 1.3     #Grayson's syndrome:  -Follows with Dr. 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 He was treated with Pulse Decadron and IVIG. He received 1 unit of packed red blood cells. and then prednisone was started. He was discharged on 1/7/23. Feels well. No c/o. Was in remission with well compensated hemolytic anemia on low dose prednisone, but relapsed after tapered to 3 mg daily. Was started on prednisone 75 mg daily and is responding with improvement in counts.        ROS:  Negative except for:    PAST MEDICAL & SURGICAL HISTORY:  HTN      SVT (Supraventricular Tachycardia)      Non-Ischemic Cardiomyopathy  now s/p transplant 2018      GIB (gastrointestinal bleeding)      Hepatitis C virus      DVT of upper extremity (deep vein thrombosis)      Former smoker      HLD (hyperlipidemia)      Knee pain, right      H/O autoimmune hemolytic anemia      H/O hemolytic anemia      Status post left hip replacement      H/O heart transplant  2/2018          SOCIAL HISTORY:    FAMILY HISTORY:      MEDICATIONS  (STANDING):  apixaban 5 milliGRAM(s) Oral every 12 hours  aspirin enteric coated 81 milliGRAM(s) Oral daily  atorvastatin 10 milliGRAM(s) Oral at bedtime  cefepime   IVPB 2000 milliGRAM(s) IV Intermittent every 12 hours  cefepime   IVPB      dextrose 50% Injectable 25 Gram(s) IV Push once  dextrose 50% Injectable 12.5 Gram(s) IV Push once  dextrose 50% Injectable 25 Gram(s) IV Push once  dextrose Oral Gel 15 Gram(s) Oral once  folic acid 1 milliGRAM(s) Oral daily  glucagon  Injectable 1 milliGRAM(s) IntraMuscular once  insulin lispro (ADMELOG) corrective regimen sliding scale   SubCutaneous three times a day before meals  insulin lispro (ADMELOG) corrective regimen sliding scale   SubCutaneous at bedtime  magnesium sulfate  IVPB 2 Gram(s) IV Intermittent once  metoprolol succinate  milliGRAM(s) Oral daily  pantoprazole    Tablet 40 milliGRAM(s) Oral before breakfast  predniSONE   Tablet 75 milliGRAM(s) Oral daily  tacrolimus 5 milliGRAM(s) Oral <User Schedule>  trimethoprim   80 mG/sulfamethoxazole 400 mG 1 Tablet(s) Oral daily  valGANciclovir 450 milliGRAM(s) Oral daily  vancomycin  IVPB        MEDICATIONS  (PRN):  acetaminophen     Tablet .. 650 milliGRAM(s) Oral every 6 hours PRN Temp greater or equal to 38C (100.4F), Mild Pain (1 - 3)  melatonin 3 milliGRAM(s) Oral at bedtime PRN Insomnia      Allergies    No Known Allergies    Intolerances        Vital Signs Last 24 Hrs  T(C): 37 (04 Mar 2023 04:32), Max: 37.8 (03 Mar 2023 17:46)  T(F): 98.6 (04 Mar 2023 04:32), Max: 100.1 (03 Mar 2023 17:46)  HR: 110 (04 Mar 2023 06:45) (92 - 142)  BP: 107/77 (04 Mar 2023 04:32) (87/71 - 128/69)  BP(mean): 71 (03 Mar 2023 23:03) (71 - 83)  RR: 17 (04 Mar 2023 04:32) (17 - 26)  SpO2: 92% (04 Mar 2023 04:32) (92% - 98%)    Parameters below as of 04 Mar 2023 04:32  Patient On (Oxygen Delivery Method): room air        PHYSICAL EXAM  General: adult in NAD  HEENT: clear oropharynx, anicteric sclera, pink conjunctiva  Neck: supple  CV: normal S1/S2 with no murmur rubs or gallops  Lungs: positive air movement b/l ant lungs,clear to auscultation, no wheezes, no rales  Abdomen: soft non-tender non-distended, no hepatosplenomegaly  Ext: no clubbing cyanosis or edema  Skin: no rashes and no petechiae  Neuro: alert and oriented X 4, no focal deficits      LABS:                          11.1   6.24  )-----------( 109      ( 04 Mar 2023 00:46 )             34.2         Mean Cell Volume : 102.7 fl  Mean Cell Hemoglobin : 33.3 pg  Mean Cell Hemoglobin Concentration : 32.5 gm/dL  Auto Neutrophil # : 5.33 K/uL  Auto Lymphocyte # : 0.61 K/uL  Auto Monocyte # : 0.22 K/uL  Auto Eosinophil # : 0.01 K/uL  Auto Basophil # : 0.01 K/uL  Auto Neutrophil % : 85.3 %  Auto Lymphocyte % : 9.8 %  Auto Monocyte % : 3.5 %  Auto Eosinophil % : 0.2 %  Auto Basophil % : 0.2 %      03-04    136  |  103  |  40<H>  ----------------------------<  438<H>  4.8   |  19<L>  |  1.85<H>    Ca    7.7<L>      04 Mar 2023 00:46  Mg     1.7     03-04    TPro  5.1<L>  /  Alb  2.4<L>  /  TBili  1.3<H>  /  DBili  x   /  AST  32  /  ALT  24  /  AlkPhos  53  03-04                      BLOOD SMEAR INTERPRETATION:       RADIOLOGY & ADDITIONAL STUDIES:       HEMATOLOGY ONCOLOGY CONSULT     Patient is a 72y old  Male who presents with a chief complaint of chest tightness (04 Mar 2023 07:41)      HPI:  71 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 Rosario, on prednisone 75mg d/t resistant disease)   HTN, HLD, NICM, chronic systolic heart failure s/p HM2 LVAD (6/2017) s/p heart transplant from Hep. C donor (treated) 2/23/18 (post op course complicated by graft dysfunction treated by plasmapheresis, IVIG, and rituximab), presenting w/ chest tightness x1d. Reports tightness started suddenly 1 day ago. Patient is a poor historian and is unable to elaborate much further. Denies associated symptoms - no SOB, palpitations, arm/jaw pain, nausea, vomiting, orthopnea, PND. Has a non-productive cough that he reports has been going on for a while. Denies fevers, congestion, constitutional symptoms, sick contacts. No new limits on exercise tolerance - at baseline is able to walk 2 blocks before becoming dyspneic.     Admitted 2/17 for routine LHC and endomyocardial biopsy; coronaries patent. Admitted Jan 2023 for Hgb 6.5 secondary to AIHA; treated w/ 4d IV dexamethasone.     ED vitals: 120/82, , RR 24, afebrile  ED course: Gave home dose of Metoprolol succinate 200mg for tachycardia. EP consulted, transplant cards consulted, recommended continuing metoprolol 200mg and starting diltiazem gtt. CT C/A/P done, given vanc/zosyn x1, Bcx sent.  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 Rosario   -Diagnosed after he was admitted to [facility] on 1/4/23 for hemolytic anemia with hgb 6.5. He was treated with Pulse Decadron and IVIG. He received 1 unit of packed red blood cells, and then prednisone was started. He was discharged on 1/7/23. Was in remission with well compensated hemolytic anemia on low dose prednisone, but relapsed after tapered to 3 mg daily. Was started on prednisone 75 mg daily and is responding with improvement in counts.        ROS:  Negative except for:    PAST MEDICAL & SURGICAL HISTORY:  HTN      SVT (Supraventricular Tachycardia)      Non-Ischemic Cardiomyopathy  now s/p transplant 2018      GIB (gastrointestinal bleeding)      Hepatitis C virus      DVT of upper extremity (deep vein thrombosis)      Former smoker      HLD (hyperlipidemia)      Knee pain, right      H/O autoimmune hemolytic anemia      H/O hemolytic anemia      Status post left hip replacement      H/O heart transplant  2/2018          SOCIAL HISTORY:    FAMILY HISTORY:      MEDICATIONS  (STANDING):  apixaban 5 milliGRAM(s) Oral every 12 hours  aspirin enteric coated 81 milliGRAM(s) Oral daily  atorvastatin 10 milliGRAM(s) Oral at bedtime  cefepime   IVPB 2000 milliGRAM(s) IV Intermittent every 12 hours  cefepime   IVPB      dextrose 50% Injectable 25 Gram(s) IV Push once  dextrose 50% Injectable 12.5 Gram(s) IV Push once  dextrose 50% Injectable 25 Gram(s) IV Push once  dextrose Oral Gel 15 Gram(s) Oral once  folic acid 1 milliGRAM(s) Oral daily  glucagon  Injectable 1 milliGRAM(s) IntraMuscular once  insulin lispro (ADMELOG) corrective regimen sliding scale   SubCutaneous three times a day before meals  insulin lispro (ADMELOG) corrective regimen sliding scale   SubCutaneous at bedtime  magnesium sulfate  IVPB 2 Gram(s) IV Intermittent once  metoprolol succinate  milliGRAM(s) Oral daily  pantoprazole    Tablet 40 milliGRAM(s) Oral before breakfast  predniSONE   Tablet 75 milliGRAM(s) Oral daily  tacrolimus 5 milliGRAM(s) Oral <User Schedule>  trimethoprim   80 mG/sulfamethoxazole 400 mG 1 Tablet(s) Oral daily  valGANciclovir 450 milliGRAM(s) Oral daily  vancomycin  IVPB        MEDICATIONS  (PRN):  acetaminophen     Tablet .. 650 milliGRAM(s) Oral every 6 hours PRN Temp greater or equal to 38C (100.4F), Mild Pain (1 - 3)  melatonin 3 milliGRAM(s) Oral at bedtime PRN Insomnia      Allergies    No Known Allergies    Intolerances        Vital Signs Last 24 Hrs  T(C): 37 (04 Mar 2023 04:32), Max: 37.8 (03 Mar 2023 17:46)  T(F): 98.6 (04 Mar 2023 04:32), Max: 100.1 (03 Mar 2023 17:46)  HR: 110 (04 Mar 2023 06:45) (92 - 142)  BP: 107/77 (04 Mar 2023 04:32) (87/71 - 128/69)  BP(mean): 71 (03 Mar 2023 23:03) (71 - 83)  RR: 17 (04 Mar 2023 04:32) (17 - 26)  SpO2: 92% (04 Mar 2023 04:32) (92% - 98%)    Parameters below as of 04 Mar 2023 04:32  Patient On (Oxygen Delivery Method): room air        PHYSICAL EXAM  General: adult in NAD  HEENT: clear oropharynx, anicteric sclera, pink conjunctiva  Neck: supple  CV: normal S1/S2 with no murmur rubs or gallops  Lungs: positive air movement b/l ant lungs,clear to auscultation, no wheezes, no rales  Abdomen: soft non-tender non-distended, no hepatosplenomegaly  Ext: no clubbing cyanosis or edema  Skin: no rashes and no petechiae  Neuro: alert and oriented X 4, no focal deficits      LABS:                          11.1   6.24  )-----------( 109      ( 04 Mar 2023 00:46 )             34.2         Mean Cell Volume : 102.7 fl  Mean Cell Hemoglobin : 33.3 pg  Mean Cell Hemoglobin Concentration : 32.5 gm/dL  Auto Neutrophil # : 5.33 K/uL  Auto Lymphocyte # : 0.61 K/uL  Auto Monocyte # : 0.22 K/uL  Auto Eosinophil # : 0.01 K/uL  Auto Basophil # : 0.01 K/uL  Auto Neutrophil % : 85.3 %  Auto Lymphocyte % : 9.8 %  Auto Monocyte % : 3.5 %  Auto Eosinophil % : 0.2 %  Auto Basophil % : 0.2 %      03-04    136  |  103  |  40<H>  ----------------------------<  438<H>  4.8   |  19<L>  |  1.85<H>    Ca    7.7<L>      04 Mar 2023 00:46  Mg     1.7     03-04    TPro  5.1<L>  /  Alb  2.4<L>  /  TBili  1.3<H>  /  DBili  x   /  AST  32  /  ALT  24  /  AlkPhos  53  03-04                      BLOOD SMEAR INTERPRETATION:       RADIOLOGY & ADDITIONAL STUDIES:

## 2023-03-04 NOTE — CONSULT NOTE ADULT - ATTENDING COMMENTS
72-yr-old man with h/o Grayson's syndrome having chronic hemolysis that worsens if Steroid is tapered down below 75 mg. Patient is admitted for chest tightness likely to respiratory viral infection. He has a transplanted heart and transplant team wants to decrease the steroid dose. Agree with fellow's recommendation to decrease the dose if ID suggests. Please note that the patient develops aggravated hemolysis at a lower dose. Monitor hemolysis parameters. Supportive.

## 2023-03-04 NOTE — CONSULT NOTE ADULT - ASSESSMENT
Mr. Baez is a 71 year old male with PMH of HTN, HLD, NICM, chronic systolic heart failure s/p HM2 LVAD (6/2017) s/p heart transplant from Hep. C donor (treated) 2/23/18 (post op course complicated by graft dysfunction treated by plasmapheresis, IVIG, and rituximab), presenting w/ chest tightness x1d. Reports tightness started suddenly 1 day ago. Pt has a non-productive cough that he reports has been going on for a while. Denies fevers, congestion, constitutional symptoms, sick contacts,  SOB, palpitations, arm/jaw pain, nausea, vomiting, orthopnea, PND. Pt was recently admitted Jan 2023 for Hgb 6.5 secondary to AIHA; treated w/ 4d IV dexamethasone.   In ED pt was afebrile with no leukocytosis. Tamx of 100.1 with tachycardia to 142 s/p metoprolol and on dilt gtt. CT C/A/P showed Patchy areas of consolidation within  the right upper lobe and left lower lobe and lingula likely represents additional areasof atelectasis versus infection. RVP with metapneumo virus. Pt was given vanc/zosyn  and started on vanc/cefepime. ID consulted for the same.    WORKUP  CT Chest Abdomen and Pelvis No Cont (03.03.23 @ 21:21) >  Impacted bronchus intermedius with near complete collapse of the right middle and lower lobes, with slightly increased aeration as compared to 9/19/2020. Patchy areas of consolidation within  the right upper lobe and left lower lobe and lingula likely represents additional areasof atelectasis versus infection.  No acute intra-abdominal abnormality. Nonspecific left perinephric stranding, slightly increased from prior.   RVP: Metapneumovirus  (03-04 @ 06:33)  Procal: 2.39 (03-04)  BNP: 3349 (03-03)  UA: negative  DIAGNOSIS and IMPRESSION  ·	Metapneumovirus Infection  ·	NORMAN  ·	s/p heart transplant 2/23/2018  ·	Hx of HCV from donor s/p treatment  ·	Hx of C.Diff in 2020    Tmax of 100.1 w/o leukocytosis  s/p Zosyn on admission  Currently on vanc 1gm Q12 and cefepime 2gm Q12 (3/3 - )  No hypoxia or SOB  On tacro 5mg Pred 75mg and off cellcept  Ppx: valGANciclovir 900 daily, Bactrim    RECOMMENDATIONS  f/u urine legionella, CMV, cryptococcal antigen, fungitel, MRSA PCR, RVP  f/u Bcx  Decrease vanc to 1gm Q24 and Cefepime to 1gm Q12 based on CrCl of 36  Decrease valganciclovir to 450mg Every other day  Decrease bactrim 1 SS every other day    PT TO BE SEEN. PRELIM NOTE  PENDING RECS. PLEASE WAIT FOR FINAL RECS AFTER DISCUSSION WITH ATTENDING#    Brett Freitas MD, PGY5  ID fellow  [employer] Teams Preferred  After 5pm/weekends call [phone]   Mr. Baez is a 71 year old male with PMH of HTN, HLD, NICM, chronic systolic heart failure s/p HM2 LVAD (6/2017) s/p heart transplant from Hep. C donor (treated) 2/23/18 (post op course complicated by graft dysfunction treated by plasmapheresis, IVIG, and rituximab), presenting w/ chest tightness x1d. Reports tightness started suddenly 1 day ago. Pt has a non-productive cough that he reports has been going on for a while. Denies fevers, congestion, constitutional symptoms, sick contacts,  SOB, palpitations, arm/jaw pain, nausea, vomiting, orthopnea, PND. Pt was recently admitted Jan 2023 for Hgb 6.5 secondary to AIHA; treated w/ 4d IV dexamethasone.   In ED pt was afebrile with no leukocytosis. Tamx of 100.1 with tachycardia to 142 s/p metoprolol and on dilt gtt. CT C/A/P showed Patchy areas of consolidation within  the right upper lobe and left lower lobe and lingula likely represents additional areasof atelectasis versus infection. RVP with metapneumo virus. Pt was given vanc/zosyn  and started on vanc/cefepime. ID consulted for the same.    WORKUP  CT Chest Abdomen and Pelvis No Cont (03.03.23 @ 21:21) >  Impacted bronchus intermedius with near complete collapse of the right middle and lower lobes, with slightly increased aeration as compared to 9/19/2020. Patchy areas of consolidation within  the right upper lobe and left lower lobe and lingula likely represents additional areasof atelectasis versus infection.  No acute intra-abdominal abnormality. Nonspecific left perinephric stranding, slightly increased from prior.   RVP: Metapneumovirus  (03-04 @ 06:33)  Procal: 2.39 (03-04)  BNP: 3349 (03-03)  UA: negative    DIAGNOSIS and IMPRESSION  ·	Metapneumovirus Infection  ·	NORMAN  ·	s/p heart transplant 2/23/2018  ·	Hx of HCV from donor s/p treatment  ·	Hx of C.Diff in 2020    Tmax of 100.1 w/o leukocytosis  s/p Zosyn on admission  Currently on vanc 1gm Q12 and cefepime 2gm Q12 (3/3 - )  No hypoxia or SOB  On tacro 5mg Pred 75mg and off cellcept  Ppx: valGANciclovir 900 daily, Bactrim    RECOMMENDATIONS  f/u urine legionella, CMV, cryptococcal antigen, fungitel, MRSA PCR,  Bcx  Recommend Sputum cx and pulm eval   Decrease vanc to 1gm Q24 and Cefepime to 1gm Q12 based on CrCl of 36  Decrease valganciclovir to 450mg Every other day  Decrease bactrim 1 SS every other day    PT TO BE SEEN. PRELIM NOTE  PENDING RECS. PLEASE WAIT FOR FINAL RECS AFTER DISCUSSION WITH ATTENDING#    Brett Freitas MD, PGY5  ID fellow  [employer] Teams Preferred  After 5pm/weekends call [phone]   Mr. Baez is a 71 year old male with PMH of HTN, HLD, NICM, chronic systolic heart failure s/p HM2 LVAD (6/2017) s/p heart transplant from Hep. C donor (treated) 2/23/18 (post op course complicated by graft dysfunction treated by plasmapheresis, IVIG, and rituximab), presenting w/ chest tightness x1d. Reports tightness started suddenly 1 day ago. Pt has a non-productive cough that he reports has been going on for a while. Denies fevers, congestion, constitutional symptoms, sick contacts,  SOB, palpitations, arm/jaw pain, nausea, vomiting, orthopnea, PND. Pt was recently admitted Jan 2023 for Hgb 6.5 secondary to AIHA; treated w/ 4d IV dexamethasone.   In ED pt was afebrile with no leukocytosis. Tamx of 100.1 with tachycardia to 142 s/p metoprolol and on dilt gtt. CT C/A/P showed Patchy areas of consolidation within  the right upper lobe and left lower lobe and lingula likely represents additional areasof atelectasis versus infection. RVP with metapneumo virus. Pt was given vanc/zosyn  and started on vanc/cefepime. ID consulted for the same.    WORKUP  CT Chest Abdomen and Pelvis No Cont (03.03.23 @ 21:21) >  Impacted bronchus intermedius with near complete collapse of the right middle and lower lobes, with slightly increased aeration as compared to 9/19/2020. Patchy areas of consolidation within  the right upper lobe and left lower lobe and lingula likely represents additional areasof atelectasis versus infection.  No acute intra-abdominal abnormality. Nonspecific left perinephric stranding, slightly increased from prior.   RVP: Metapneumovirus  (03-04 @ 06:33)  Procal: 2.39 (03-04)  BNP: 3349 (03-03)  UA: negative    DIAGNOSIS and IMPRESSION  ·	Metapneumovirus Infection  ·	NORMAN  ·	s/p heart transplant 2/23/2018  ·	Hx of HCV from donor s/p treatment  ·	Hx of C.Diff in 2020    Tmax of 100.1 w/o leukocytosis  s/p Zosyn on admission  Currently on vanc 1gm Q12 and cefepime 2gm Q12 (3/3 - )  No hypoxia or SOB  On tacro 5mg Pred 75mg and off cellcept  Ppx: valGANciclovir 900 daily, Bactrim    RECOMMENDATIONS  f/u urine legionella, CMV, cryptococcal antigen, fungitel, MRSA PCR,  Bcx  Recommend Sputum cx and pulm eval   Decrease vanc to 1gm Q24 and Cefepime to 1gm Q12 based on CrCl of 36  Decrease valganciclovir to 450mg Every other day  Decrease bactrim 1 SS every other day    Pt seen and examined. Case d/w attending   Recommendation provided to primary team via MS Teams.    Brett Freitas MD, PGY5  ID fellow  [employer] Teams Preferred  After 5pm/weekends call [phone]

## 2023-03-04 NOTE — H&P ADULT - PROBLEM SELECTOR PLAN 9
Diet: DASH, Carb consistent  DVT: Eliquis 5mg BID Followed by [facility] as outpatient; admitted 6.5 in Jan 2023, treated w/ 4d IV dexamethasone.   Per hematology, recommended to avoid blood transfusion unless patient is symptomatic due to risk of hyperhemolysis. Transfusion is okay from heart transplant perspective.  - maintain active T/S

## 2023-03-04 NOTE — PROGRESS NOTE ADULT - ATTENDING COMMENTS
Patient seen and examined at bedside.  Patient has now converted to sinus rhythm   Echo showed normal BIV function  On tacrolimus 5mg BID, please obtain a daily tac level before the tacrolimus is given in the AM  Patient is positive for human metapneumovirus. CT chest was performed which showed bilateral patchy opacities. Was started on antibiotics per ID  Patient is on prednisone 75mg for hemolytic anemia, please consult heme/onc to assess if it can be reduced in the setting of possible infection

## 2023-03-04 NOTE — PROGRESS NOTE ADULT - PROBLEM SELECTOR PLAN 9
Followed by [facility] as outpatient; admitted 6.5 in Jan 2023, treated w/ 4d IV dexamethasone.   Per hematology, recommended to avoid blood transfusion unless patient is symptomatic due to risk of hyperhemolysis. Transfusion is okay from heart transplant perspective.  - maintain active T/S Followed by [facility] as outpatient; admitted 6.5 in Jan 2023, treated w/ 4d IV dexamethasone.   Per hematology, recommended to avoid blood transfusion unless patient is symptomatic due to risk of hyperhemolysis. Transfusion is okay from heart transplant perspective however per blood bank It will need to be emergent.   - maintain active T/S  - Hematology consulted for help with steroid management in setting of acute infection; f/u recs  - f/u hemolysis labs

## 2023-03-04 NOTE — PROGRESS NOTE ADULT - SUBJECTIVE AND OBJECTIVE BOX
INTERVAL HPI/OVERNIGHT EVENTS:    SUBJECTIVE: Patient seen and examined at bedside.    OBJECTIVE:    VITAL SIGNS:  ICU Vital Signs Last 24 Hrs  T(C): 37 (04 Mar 2023 04:32), Max: 37.8 (03 Mar 2023 17:46)  T(F): 98.6 (04 Mar 2023 04:32), Max: 100.1 (03 Mar 2023 17:46)  HR: 110 (04 Mar 2023 06:45) (92 - 142)  BP: 107/77 (04 Mar 2023 04:32) (87/71 - 128/69)  BP(mean): 71 (03 Mar 2023 23:03) (71 - 83)  ABP: --  ABP(mean): --  RR: 17 (04 Mar 2023 04:32) (17 - 26)  SpO2: 92% (04 Mar 2023 04:32) (92% - 98%)    O2 Parameters below as of 04 Mar 2023 04:32  Patient On (Oxygen Delivery Method): room air              CAPILLARY BLOOD GLUCOSE      POCT Blood Glucose.: 264 mg/dL (04 Mar 2023 07:22)      PHYSICAL EXAM:    General: NAD  HEENT: NC/AT; PERRL, clear conjunctiva  Neck: supple  Respiratory: CTA b/l  Cardiovascular: +S1/S2; RRR  Abdomen: soft, NT/ND; +BS x4  Extremities:  no LE edema  Vascular: WWP, 2+ peripheral pulses b/l;  Skin: normal color and turgor; no rash  Neurological: A&Ox3, move all extremities. CN II-XII intact    MEDICATIONS:  MEDICATIONS  (STANDING):  apixaban 5 milliGRAM(s) Oral every 12 hours  aspirin enteric coated 81 milliGRAM(s) Oral daily  atorvastatin 10 milliGRAM(s) Oral at bedtime  cefepime   IVPB 2000 milliGRAM(s) IV Intermittent every 12 hours  cefepime   IVPB      dextrose 50% Injectable 25 Gram(s) IV Push once  dextrose 50% Injectable 12.5 Gram(s) IV Push once  dextrose 50% Injectable 25 Gram(s) IV Push once  dextrose Oral Gel 15 Gram(s) Oral once  diltiazem Infusion 5 mG/Hr (5 mL/Hr) IV Continuous <Continuous>  folic acid 1 milliGRAM(s) Oral daily  glucagon  Injectable 1 milliGRAM(s) IntraMuscular once  insulin lispro (ADMELOG) corrective regimen sliding scale   SubCutaneous three times a day before meals  insulin lispro (ADMELOG) corrective regimen sliding scale   SubCutaneous at bedtime  metoprolol succinate  milliGRAM(s) Oral daily  pantoprazole    Tablet 40 milliGRAM(s) Oral before breakfast  predniSONE   Tablet 75 milliGRAM(s) Oral daily  tacrolimus 5 milliGRAM(s) Oral two times a day  valGANciclovir 450 milliGRAM(s) Oral daily  vancomycin  IVPB        MEDICATIONS  (PRN):  acetaminophen     Tablet .. 650 milliGRAM(s) Oral every 6 hours PRN Temp greater or equal to 38C (100.4F), Mild Pain (1 - 3)  melatonin 3 milliGRAM(s) Oral at bedtime PRN Insomnia      ALLERGIES:  Allergies    No Known Allergies    Intolerances        LABS:                        11.1   6.24  )-----------( 109      ( 04 Mar 2023 00:46 )             34.2     03-04    136  |  103  |  40<H>  ----------------------------<  438<H>  4.8   |  19<L>  |  1.85<H>    Ca    7.7<L>      04 Mar 2023 00:46  Mg     1.7     03-04    TPro  5.1<L>  /  Alb  2.4<L>  /  TBili  1.3<H>  /  DBili  x   /  AST  32  /  ALT  24  /  AlkPhos  53  03-04      Urinalysis Basic - ( 03 Mar 2023 22:35 )    Color: Yellow / Appearance: Slightly Turbid / S.027 / pH: x  Gluc: x / Ketone: Negative  / Bili: Negative / Urobili: Negative   Blood: x / Protein: 30 mg/dL / Nitrite: Negative   Leuk Esterase: Negative / RBC: 1 /hpf / WBC 4 /HPF   Sq Epi: x / Non Sq Epi: 3 /hpf / Bacteria: Negative        RADIOLOGY & ADDITIONAL TESTS: Reviewed. INTERVAL HPI/OVERNIGHT EVENTS: Patient admitted overnight    SUBJECTIVE: Patient seen and examined at bedside. This AM, patient stated he was no longer having chest pain, palpitations. Does endorse shortness of breath at this time.     OBJECTIVE:    VITAL SIGNS:  ICU Vital Signs Last 24 Hrs  T(C): 37 (04 Mar 2023 04:32), Max: 37.8 (03 Mar 2023 17:46)  T(F): 98.6 (04 Mar 2023 04:32), Max: 100.1 (03 Mar 2023 17:46)  HR: 110 (04 Mar 2023 06:45) (92 - 142)  BP: 107/77 (04 Mar 2023 04:32) (87/71 - 128/69)  BP(mean): 71 (03 Mar 2023 23:03) (71 - 83)  ABP: --  ABP(mean): --  RR: 17 (04 Mar 2023 04:32) (17 - 26)  SpO2: 92% (04 Mar 2023 04:32) (92% - 98%)    O2 Parameters below as of 04 Mar 2023 04:32  Patient On (Oxygen Delivery Method): room air              CAPILLARY BLOOD GLUCOSE      POCT Blood Glucose.: 264 mg/dL (04 Mar 2023 07:22)      PHYSICAL EXAM:    General: NAD  HEENT: NC/AT; PERRL, clear conjunctiva  Neck: supple  Respiratory: CTA b/l  Cardiovascular: +S1/S2; RRR; slightly muffled heart sounds  Abdomen: soft, NT/ND; +BS x4  Extremities:  no LE edema; outbowing of legs   Vascular: WWP, 2+ peripheral pulses b/l;  Skin: normal color and turgor; no rash  Neurological: A&Ox3, move all extremities. CN II-XII intact    MEDICATIONS:  MEDICATIONS  (STANDING):  apixaban 5 milliGRAM(s) Oral every 12 hours  aspirin enteric coated 81 milliGRAM(s) Oral daily  atorvastatin 10 milliGRAM(s) Oral at bedtime  cefepime   IVPB 2000 milliGRAM(s) IV Intermittent every 12 hours  cefepime   IVPB      dextrose 50% Injectable 25 Gram(s) IV Push once  dextrose 50% Injectable 12.5 Gram(s) IV Push once  dextrose 50% Injectable 25 Gram(s) IV Push once  dextrose Oral Gel 15 Gram(s) Oral once  diltiazem Infusion 5 mG/Hr (5 mL/Hr) IV Continuous <Continuous>  folic acid 1 milliGRAM(s) Oral daily  glucagon  Injectable 1 milliGRAM(s) IntraMuscular once  insulin lispro (ADMELOG) corrective regimen sliding scale   SubCutaneous three times a day before meals  insulin lispro (ADMELOG) corrective regimen sliding scale   SubCutaneous at bedtime  metoprolol succinate  milliGRAM(s) Oral daily  pantoprazole    Tablet 40 milliGRAM(s) Oral before breakfast  predniSONE   Tablet 75 milliGRAM(s) Oral daily  tacrolimus 5 milliGRAM(s) Oral two times a day  valGANciclovir 450 milliGRAM(s) Oral daily  vancomycin  IVPB        MEDICATIONS  (PRN):  acetaminophen     Tablet .. 650 milliGRAM(s) Oral every 6 hours PRN Temp greater or equal to 38C (100.4F), Mild Pain (1 - 3)  melatonin 3 milliGRAM(s) Oral at bedtime PRN Insomnia      ALLERGIES:  Allergies    No Known Allergies    Intolerances        LABS:                        11.1   6.24  )-----------( 109      ( 04 Mar 2023 00:46 )             34.2     03-04    136  |  103  |  40<H>  ----------------------------<  438<H>  4.8   |  19<L>  |  1.85<H>    Ca    7.7<L>      04 Mar 2023 00:46  Mg     1.7     03-04    TPro  5.1<L>  /  Alb  2.4<L>  /  TBili  1.3<H>  /  DBili  x   /  AST  32  /  ALT  24  /  AlkPhos  53  03-04      Urinalysis Basic - ( 03 Mar 2023 22:35 )    Color: Yellow / Appearance: Slightly Turbid / S.027 / pH: x  Gluc: x / Ketone: Negative  / Bili: Negative / Urobili: Negative   Blood: x / Protein: 30 mg/dL / Nitrite: Negative   Leuk Esterase: Negative / RBC: 1 /hpf / WBC 4 /HPF   Sq Epi: x / Non Sq Epi: 3 /hpf / Bacteria: Negative        RADIOLOGY & ADDITIONAL TESTS: Reviewed.

## 2023-03-04 NOTE — H&P ADULT - PROBLEM SELECTOR PLAN 2
- s/p OHT in 2/23/18  - continue home Toprol  mg PO QD and Losartan 100 mg PO QD  - continue home ASA 81 and  pravastatin 20 mg PO QD  - continue home pantoprazole 40 mg PO QD    - underwent annual RHC/EMBx with Dr. Carpenter in February 2023 Admitted w/ HR 140s, improved after giving home dose 200mg metoprolol succinate. EP consulted and determined that patient has new-onset A-flutter. TTE done, showed EF 50-55%, concentric LV remodeling, grossly normal LV and RV systolic function.   - EP recs appreciated  - Dilt gtt to maintain HR goal   - continue home metoprolol succinate 200mg qd  - CHADSVASC 2; start anticoagulation w/ eliquis 5mg BID Admitted w/ HR 140s, improved after giving home dose 200mg metoprolol succinate. EP consulted and determined that patient has new-onset A-flutter. TTE done, showed EF 50-55%, concentric LV remodeling, grossly normal LV and RV systolic function. Possible compensatory response 2/2 underlying infection as above.   - EP recs appreciated  - Dilt gtt to maintain HR goal   - continue home metoprolol succinate 200mg qd  - CHADSVASC 2; start anticoagulation w/ eliquis 5mg BID Admitted w/ HR 140s, improved after giving home dose 200mg metoprolol succinate. EP consulted and determined that patient has new-onset A-flutter. TTE done, showed EF 50-55%, concentric LV remodeling, grossly normal LV and RV systolic function. Possible compensatory response 2/2 underlying infection as above.   - EP recs appreciated  - Dilt gtt to maintain HR goal 120  - continue home metoprolol succinate 200mg qd  - CHADSVASC 2; start anticoagulation w/ eliquis 5mg BID

## 2023-03-04 NOTE — CONSULT NOTE ADULT - ATTENDING COMMENTS
72 y/o M w/PMH as above including OHT admitted for hMPV PNA found to have chronic plugging of bronhcus intermedius on chest CT. Patient is currently breathing comfortably saturating well on room air. Unclear etiology of chronic plugging of BI.    - Supportive care for hMPV infection  - Outpatient pulmonary follow up, with possible outpatient bronch for chronically plugged BI. 70 y/o M w/PMH as above including OHT admitted for hMPV PNA found to have chronic plugging of bronhcus intermedius on chest CT. Patient is currently breathing comfortably saturating well on room air. Unclear etiology of chronic plugging of BI.    - Supportive care for hMPV infection  - Abx as per ID  - Outpatient pulmonary follow up, with possible outpatient bronch for chronically plugged BI.

## 2023-03-04 NOTE — PROGRESS NOTE ADULT - PROBLEM SELECTOR PLAN 4
Patient AOx1 on admission, AOx3 by time of interview though slow to respond to questions, falling asleep intermittently during exam. Considering infection-mediated vs 2/2 hyperglycemia.  - CTH negative for acute pathology  - glycemic control as below; monitor for signs of DKA

## 2023-03-04 NOTE — PROGRESS NOTE ADULT - PROBLEM SELECTOR PLAN 1
P/w chest tightness x1d associated w/ dry cough; likely multifactorial in s/o atrial flutter w/ HR 140s, notes that his chest tightness is improving as HR is lowered. Also considering contribution from CT chest findings of impacted R middle and lower lobe bronchi w/ near collapse of R middle/lower lobes.   - s/p Vanc/Zosyn in ED, 1L IVF  - f/u Bcx  - Procal elevated to 2.39; neutrophilic predominance on diff, patient is immunosuppressed, possibly would not mount significant immune response to infection  - continue abx: Vanc 1g q12h (3/4 - ) and cefepime 1g q12h (3/4 - ) --> adjusted for CrCl 36  - f/u urine legionella, CMV, cryptococcal antigen, fungitel, MRSA PCR, RVP  - treatment of aflutter as below P/w chest tightness x1d associated w/ dry cough; likely multifactorial in s/o atrial flutter w/ HR 140s, notes that his chest tightness is improving as HR is lowered. Also considering contribution from CT chest findings of impacted R middle and lower lobe bronchi w/ near collapse of R middle/lower lobes.   - s/p Vanc/Zosyn in ED, 1L IVF  - f/u Bcx  - Procal elevated to 2.39; neutrophilic predominance on diff, patient is immunosuppressed, possibly would not mount significant immune response to infection  - continue abx: Vanc 1g q12h (3/4 - ) and cefepime 1g q12h (3/4 - ) --> adjusted for CrCl 36  - f/u urine legionella, CMV, cryptococcal antigen, fungitel, MRSA PCR  - treatment of aflutter as below  - RVP positive for human metapneumovirus   -Pulm consulted for possible bronch; f/u recs  - Transplant ID following; f/u recs  - c/w ppx of bactrim and valcyclovir

## 2023-03-04 NOTE — PROGRESS NOTE ADULT - ASSESSMENT
71 y/o male with hx of nonischemic cardiomyopathy s/p HM2 LVAD, underwent OHT 2/23/18 with hepatitis C donor, hx of hemolytic anemia presented w/ 1d chest tightness, found to have new onset aflutter w/ RVR,  PNA w/ course c/b encephalopathy.

## 2023-03-05 LAB
ALBUMIN SERPL ELPH-MCNC: 2.9 G/DL — LOW (ref 3.3–5)
ALP SERPL-CCNC: 81 U/L — SIGNIFICANT CHANGE UP (ref 40–120)
ALT FLD-CCNC: 23 U/L — SIGNIFICANT CHANGE UP (ref 10–45)
ANION GAP SERPL CALC-SCNC: 16 MMOL/L — SIGNIFICANT CHANGE UP (ref 5–17)
AST SERPL-CCNC: 25 U/L — SIGNIFICANT CHANGE UP (ref 10–40)
BASOPHILS # BLD AUTO: 0 K/UL — SIGNIFICANT CHANGE UP (ref 0–0.2)
BILIRUB DIRECT SERPL-MCNC: 0.2 MG/DL — SIGNIFICANT CHANGE UP (ref 0–0.3)
BILIRUB INDIRECT FLD-MCNC: 0.5 MG/DL — SIGNIFICANT CHANGE UP (ref 0.2–1)
BILIRUB SERPL-MCNC: 0.7 MG/DL — SIGNIFICANT CHANGE UP (ref 0.2–1.2)
BUN SERPL-MCNC: 61 MG/DL — HIGH (ref 7–23)
CALCIUM SERPL-MCNC: 8.8 MG/DL — SIGNIFICANT CHANGE UP (ref 8.4–10.5)
CHLORIDE SERPL-SCNC: 102 MMOL/L — SIGNIFICANT CHANGE UP (ref 96–108)
CO2 SERPL-SCNC: 18 MMOL/L — LOW (ref 22–31)
CREAT ?TM UR-MCNC: 191 MG/DL — SIGNIFICANT CHANGE UP
CREAT SERPL-MCNC: 2.85 MG/DL — HIGH (ref 0.5–1.3)
D DIMER BLD IA.RAPID-MCNC: 1269 NG/ML DDU — HIGH
EGFR: 23 ML/MIN/1.73M2 — LOW
EOSINOPHIL # BLD AUTO: 0 K/UL — SIGNIFICANT CHANGE UP (ref 0–0.5)
FIBRINOGEN PPP-MCNC: 879 MG/DL — HIGH (ref 200–445)
GLUCOSE BLDC GLUCOMTR-MCNC: 115 MG/DL — HIGH (ref 70–99)
GLUCOSE BLDC GLUCOMTR-MCNC: 131 MG/DL — HIGH (ref 70–99)
GLUCOSE BLDC GLUCOMTR-MCNC: 305 MG/DL — HIGH (ref 70–99)
GLUCOSE BLDC GLUCOMTR-MCNC: 329 MG/DL — HIGH (ref 70–99)
GLUCOSE BLDC GLUCOMTR-MCNC: 55 MG/DL — LOW (ref 70–99)
GLUCOSE BLDC GLUCOMTR-MCNC: 56 MG/DL — LOW (ref 70–99)
GLUCOSE BLDC GLUCOMTR-MCNC: 57 MG/DL — LOW (ref 70–99)
GLUCOSE BLDC GLUCOMTR-MCNC: 73 MG/DL — SIGNIFICANT CHANGE UP (ref 70–99)
GLUCOSE SERPL-MCNC: 290 MG/DL — HIGH (ref 70–99)
HAPTOGLOB SERPL-MCNC: 225 MG/DL — HIGH (ref 34–200)
HCT VFR BLD CALC: 39.4 % — SIGNIFICANT CHANGE UP (ref 39–50)
HGB BLD-MCNC: 12.4 G/DL — LOW (ref 13–17)
LDH SERPL L TO P-CCNC: 418 U/L — HIGH (ref 50–242)
LYMPHOCYTES # BLD AUTO: 0.18 K/UL — LOW (ref 1–3.3)
LYMPHOCYTES # BLD AUTO: 2 % — LOW (ref 13–44)
MAGNESIUM SERPL-MCNC: 2.6 MG/DL — SIGNIFICANT CHANGE UP (ref 1.6–2.6)
MCHC RBC-ENTMCNC: 31.5 GM/DL — LOW (ref 32–36)
MCHC RBC-ENTMCNC: 32.5 PG — SIGNIFICANT CHANGE UP (ref 27–34)
MCV RBC AUTO: 103.1 FL — HIGH (ref 80–100)
MONOCYTES # BLD AUTO: 0.36 K/UL — SIGNIFICANT CHANGE UP (ref 0–0.9)
MONOCYTES NFR BLD AUTO: 4 % — SIGNIFICANT CHANGE UP (ref 2–14)
NEUTROPHILS # BLD AUTO: 8.49 K/UL — HIGH (ref 1.8–7.4)
NEUTROPHILS NFR BLD AUTO: 93 % — HIGH (ref 43–77)
NEUTS BAND # BLD: 1 % — SIGNIFICANT CHANGE UP (ref 0–8)
NRBC # BLD: 0 /100 WBCS — SIGNIFICANT CHANGE UP (ref 0–0)
PHOSPHATE SERPL-MCNC: 5.1 MG/DL — HIGH (ref 2.5–4.5)
PLAT MORPH BLD: NORMAL — SIGNIFICANT CHANGE UP
PLATELET # BLD AUTO: 144 K/UL — LOW (ref 150–400)
POTASSIUM SERPL-MCNC: 4.6 MMOL/L — SIGNIFICANT CHANGE UP (ref 3.5–5.3)
POTASSIUM SERPL-SCNC: 4.6 MMOL/L — SIGNIFICANT CHANGE UP (ref 3.5–5.3)
PROT SERPL-MCNC: 6.3 G/DL — SIGNIFICANT CHANGE UP (ref 6–8.3)
RBC # BLD: 3.82 M/UL — LOW (ref 4.2–5.8)
RBC # BLD: 3.82 M/UL — LOW (ref 4.2–5.8)
RBC # FLD: 15.9 % — HIGH (ref 10.3–14.5)
RBC BLD AUTO: SIGNIFICANT CHANGE UP
RETICS #: 84 K/UL — SIGNIFICANT CHANGE UP (ref 25–125)
RETICS/RBC NFR: 2.2 % — SIGNIFICANT CHANGE UP (ref 0.5–2.5)
SODIUM SERPL-SCNC: 136 MMOL/L — SIGNIFICANT CHANGE UP (ref 135–145)
SODIUM UR-SCNC: 43 MMOL/L — SIGNIFICANT CHANGE UP
TACROLIMUS SERPL-MCNC: 14.8 NG/ML — SIGNIFICANT CHANGE UP
TACROLIMUS SERPL-MCNC: 9 NG/ML — SIGNIFICANT CHANGE UP
UUN UR-MCNC: 629 MG/DL — SIGNIFICANT CHANGE UP
WBC # BLD: 9.03 K/UL — SIGNIFICANT CHANGE UP (ref 3.8–10.5)
WBC # FLD AUTO: 9.03 K/UL — SIGNIFICANT CHANGE UP (ref 3.8–10.5)

## 2023-03-05 PROCEDURE — 99232 SBSQ HOSP IP/OBS MODERATE 35: CPT

## 2023-03-05 PROCEDURE — 99233 SBSQ HOSP IP/OBS HIGH 50: CPT | Mod: GC

## 2023-03-05 RX ORDER — TACROLIMUS 5 MG/1
2 CAPSULE ORAL ONCE
Refills: 0 | Status: COMPLETED | OUTPATIENT
Start: 2023-03-05 | End: 2023-03-05

## 2023-03-05 RX ORDER — VANCOMYCIN HCL 1 G
125 VIAL (EA) INTRAVENOUS EVERY 12 HOURS
Refills: 0 | Status: DISCONTINUED | OUTPATIENT
Start: 2023-03-05 | End: 2023-03-10

## 2023-03-05 RX ORDER — SODIUM CHLORIDE 9 MG/ML
1000 INJECTION, SOLUTION INTRAVENOUS
Refills: 0 | Status: COMPLETED | OUTPATIENT
Start: 2023-03-05 | End: 2023-03-05

## 2023-03-05 RX ORDER — TACROLIMUS 5 MG/1
3 CAPSULE ORAL
Refills: 0 | Status: DISCONTINUED | OUTPATIENT
Start: 2023-03-06 | End: 2023-03-06

## 2023-03-05 RX ORDER — INSULIN LISPRO 100/ML
7 VIAL (ML) SUBCUTANEOUS
Refills: 0 | Status: DISCONTINUED | OUTPATIENT
Start: 2023-03-05 | End: 2023-03-06

## 2023-03-05 RX ORDER — INSULIN LISPRO 100/ML
10 VIAL (ML) SUBCUTANEOUS
Refills: 0 | Status: DISCONTINUED | OUTPATIENT
Start: 2023-03-06 | End: 2023-03-06

## 2023-03-05 RX ORDER — INSULIN LISPRO 100/ML
VIAL (ML) SUBCUTANEOUS
Refills: 0 | Status: DISCONTINUED | OUTPATIENT
Start: 2023-03-05 | End: 2023-03-18

## 2023-03-05 RX ORDER — VALGANCICLOVIR 450 MG/1
450 TABLET, FILM COATED ORAL
Refills: 0 | Status: DISCONTINUED | OUTPATIENT
Start: 2023-03-05 | End: 2023-03-12

## 2023-03-05 RX ORDER — DEXTROSE 50 % IN WATER 50 %
15 SYRINGE (ML) INTRAVENOUS ONCE
Refills: 0 | Status: COMPLETED | OUTPATIENT
Start: 2023-03-05 | End: 2023-03-05

## 2023-03-05 RX ORDER — INSULIN LISPRO 100/ML
10 VIAL (ML) SUBCUTANEOUS
Refills: 0 | Status: DISCONTINUED | OUTPATIENT
Start: 2023-03-05 | End: 2023-03-05

## 2023-03-05 RX ORDER — INSULIN GLARGINE 100 [IU]/ML
18 INJECTION, SOLUTION SUBCUTANEOUS AT BEDTIME
Refills: 0 | Status: DISCONTINUED | OUTPATIENT
Start: 2023-03-05 | End: 2023-03-06

## 2023-03-05 RX ORDER — CEFEPIME 1 G/1
1000 INJECTION, POWDER, FOR SOLUTION INTRAMUSCULAR; INTRAVENOUS EVERY 24 HOURS
Refills: 0 | Status: COMPLETED | OUTPATIENT
Start: 2023-03-05 | End: 2023-03-08

## 2023-03-05 RX ORDER — INSULIN LISPRO 100/ML
7 VIAL (ML) SUBCUTANEOUS
Refills: 0 | Status: DISCONTINUED | OUTPATIENT
Start: 2023-03-05 | End: 2023-03-05

## 2023-03-05 RX ADMIN — Medication 8: at 14:03

## 2023-03-05 RX ADMIN — Medication 5 UNIT(S): at 09:21

## 2023-03-05 RX ADMIN — Medication 3 MILLIGRAM(S): at 22:03

## 2023-03-05 RX ADMIN — Medication 8: at 09:22

## 2023-03-05 RX ADMIN — Medication 1 MILLIGRAM(S): at 14:06

## 2023-03-05 RX ADMIN — TACROLIMUS 2 MILLIGRAM(S): 5 CAPSULE ORAL at 22:02

## 2023-03-05 RX ADMIN — INSULIN GLARGINE 18 UNIT(S): 100 INJECTION, SOLUTION SUBCUTANEOUS at 23:42

## 2023-03-05 RX ADMIN — Medication 125 MILLIGRAM(S): at 18:49

## 2023-03-05 RX ADMIN — Medication 3 MILLILITER(S): at 17:17

## 2023-03-05 RX ADMIN — APIXABAN 5 MILLIGRAM(S): 2.5 TABLET, FILM COATED ORAL at 06:10

## 2023-03-05 RX ADMIN — Medication 7 UNIT(S): at 14:47

## 2023-03-05 RX ADMIN — Medication 81 MILLIGRAM(S): at 14:05

## 2023-03-05 RX ADMIN — ATORVASTATIN CALCIUM 10 MILLIGRAM(S): 80 TABLET, FILM COATED ORAL at 22:02

## 2023-03-05 RX ADMIN — Medication 3 MILLILITER(S): at 23:52

## 2023-03-05 RX ADMIN — Medication 7 UNIT(S): at 17:59

## 2023-03-05 RX ADMIN — Medication 200 MILLIGRAM(S): at 17:26

## 2023-03-05 RX ADMIN — SODIUM CHLORIDE 4 MILLILITER(S): 9 INJECTION INTRAMUSCULAR; INTRAVENOUS; SUBCUTANEOUS at 06:09

## 2023-03-05 RX ADMIN — SODIUM CHLORIDE 4 MILLILITER(S): 9 INJECTION INTRAMUSCULAR; INTRAVENOUS; SUBCUTANEOUS at 17:25

## 2023-03-05 RX ADMIN — Medication 200 MILLIGRAM(S): at 06:09

## 2023-03-05 RX ADMIN — Medication 3 MILLILITER(S): at 06:10

## 2023-03-05 RX ADMIN — Medication 15 GRAM(S): at 22:21

## 2023-03-05 RX ADMIN — Medication 200 MILLIGRAM(S): at 06:10

## 2023-03-05 RX ADMIN — SODIUM CHLORIDE 1000 MILLILITER(S): 9 INJECTION, SOLUTION INTRAVENOUS at 09:40

## 2023-03-05 RX ADMIN — TACROLIMUS 5 MILLIGRAM(S): 5 CAPSULE ORAL at 09:35

## 2023-03-05 RX ADMIN — Medication 75 MILLIGRAM(S): at 06:11

## 2023-03-05 RX ADMIN — PANTOPRAZOLE SODIUM 40 MILLIGRAM(S): 20 TABLET, DELAYED RELEASE ORAL at 09:34

## 2023-03-05 RX ADMIN — APIXABAN 5 MILLIGRAM(S): 2.5 TABLET, FILM COATED ORAL at 17:17

## 2023-03-05 RX ADMIN — CEFEPIME 100 MILLIGRAM(S): 1 INJECTION, POWDER, FOR SOLUTION INTRAMUSCULAR; INTRAVENOUS at 06:09

## 2023-03-05 RX ADMIN — CEFEPIME 100 MILLIGRAM(S): 1 INJECTION, POWDER, FOR SOLUTION INTRAMUSCULAR; INTRAVENOUS at 17:19

## 2023-03-05 RX ADMIN — Medication 200 MILLIGRAM(S): at 14:06

## 2023-03-05 NOTE — PROGRESS NOTE ADULT - PROBLEM SELECTOR PLAN 1
P/w chest tightness x1d associated w/ dry cough; likely multifactorial in s/o atrial flutter w/ HR 140s, notes that his chest tightness is improving as HR is lowered. Also considering contribution from CT chest findings of impacted R middle and lower lobe bronchi w/ near collapse of R middle/lower lobes.   - s/p Vanc/Zosyn in ED, 1L IVF  - f/u Bcx  - Procal elevated to 2.39; neutrophilic predominance on diff, patient is immunosuppressed, possibly would not mount significant immune response to infection  - continue abx: Vanc 1g q12h (3/4 - ) and cefepime 1g q12h (3/4 - ) --> adjusted for CrCl 36  - f/u urine legionella, CMV, cryptococcal antigen, fungitel, MRSA PCR  - treatment of aflutter as below  - RVP positive for human metapneumovirus   -Pulm consulted for possible bronch; f/u recs  - Transplant ID following; f/u recs  - c/w ppx of bactrim and valcyclovir P/w chest tightness x1d associated w/ dry cough; likely multifactorial in s/o atrial flutter w/ HR 140s, notes that his chest tightness is improving as HR is lowered. Also considering contribution from CT chest findings of impacted R middle and lower lobe bronchi w/ near collapse of R middle/lower lobes.   - s/p Vanc/Zosyn in ED, 1L IVF  - f/u Bcx  - Procal elevated to 2.39; neutrophilic predominance on diff with 1% bands in immunosuppresed   - continue abx: Vanc 1g q12h (3/4 - ) and cefepime 1g q24h (3/4 - ) --> adjusted for NORMAN  -EBV negative for active infection, MRSA negative  - f/u urine legionella, CMV, cryptococcal antigen, fungitell  - treatment of aflutter as below  - RVP positive for human metapneumovirus   -Pulm consulted for possible bronch; f/u recs  - Transplant ID following; f/u recs  - c/w ppx of bactrim and valcyclovir P/w chest tightness x1d associated w/ dry cough; likely multifactorial in s/o atrial flutter w/ HR 140s, notes that his chest tightness is improving as HR is lowered. Also considering contribution from CT chest findings of impacted R middle and lower lobe bronchi w/ near collapse of R middle/lower lobes.   - s/p Vanc/Zosyn in ED, 1L IVF  - f/u Bcx  - Procal elevated to 2.39; neutrophilic predominance on diff with 1% bands in immunosuppresed   - continue abx: cefepime 1g q24h (3/4 - ) --> adjusted for NORMAN  -EBV negative for active infection, MRSA negative  - f/u urine legionella, CMV, cryptococcal antigen, fungitell  - treatment of aflutter as below  - RVP positive for human metapneumovirus   -Pulm consulted for possible bronch; f/u recs  - Transplant ID following; f/u recs  - c/w ppx of bactrim and valcyclovir

## 2023-03-05 NOTE — PROGRESS NOTE ADULT - SUBJECTIVE AND OBJECTIVE BOX
seen earlier today     Chief Complaint: Type 2 Diabetes Mellitus     INTERVAL HX: tolerating po endorses gets FS checked, insulin administered and finishes meals within 1 hours, denies snack at bedtime,denies snacking b/w meals. BG severely elevated to 300s this am. remains on pred 75mg po qd       Review of Systems:  General: As above  Cardiovascular: No chest pain  Respiratory: No SOB  GI: No nausea, vomiting  Endocrine: no  S&Sx of hypoglycemia    Allergies    No Known Allergies    Intolerances      MEDICATIONS  (STANDING):  albuterol/ipratropium for Nebulization 3 milliLiter(s) Nebulizer every 6 hours  apixaban 5 milliGRAM(s) Oral every 12 hours  aspirin enteric coated 81 milliGRAM(s) Oral daily  atorvastatin 10 milliGRAM(s) Oral at bedtime  cefepime   IVPB 1000 milliGRAM(s) IV Intermittent every 24 hours  dextrose 5%. 1000 milliLiter(s) (50 mL/Hr) IV Continuous <Continuous>  dextrose 5%. 1000 milliLiter(s) (100 mL/Hr) IV Continuous <Continuous>  dextrose 50% Injectable 25 Gram(s) IV Push once  dextrose 50% Injectable 12.5 Gram(s) IV Push once  dextrose 50% Injectable 25 Gram(s) IV Push once  dextrose Oral Gel 15 Gram(s) Oral once  folic acid 1 milliGRAM(s) Oral daily  glucagon  Injectable 1 milliGRAM(s) IntraMuscular once  guaiFENesin Oral Liquid (Sugar-Free) 200 milliGRAM(s) Oral every 6 hours  insulin glargine Injectable (LANTUS) 18 Unit(s) SubCutaneous at bedtime  insulin lispro (ADMELOG) corrective regimen sliding scale   SubCutaneous three times a day before meals  insulin lispro (ADMELOG) corrective regimen sliding scale   SubCutaneous at bedtime  insulin lispro Injectable (ADMELOG) 7 Unit(s) SubCutaneous three times a day before meals  lactated ringers. 1000 milliLiter(s) (1000 mL/Hr) IV Continuous <Continuous>  metoprolol succinate  milliGRAM(s) Oral daily  pantoprazole    Tablet 40 milliGRAM(s) Oral before breakfast  predniSONE   Tablet 75 milliGRAM(s) Oral daily  sodium chloride 3%  Inhalation 4 milliLiter(s) Inhalation every 12 hours  tacrolimus 2 milliGRAM(s) Oral once  trimethoprim   80 mG/sulfamethoxazole 400 mG 1 Tablet(s) Oral every 48 hours  valGANciclovir 450 milliGRAM(s) Oral <User Schedule>  vancomycin    Solution 125 milliGRAM(s) Oral every 12 hours      atorvastatin   10 milliGRAM(s) Oral (03-04-23 @ 22:33)    insulin glargine Injectable (LANTUS)   14 Unit(s) SubCutaneous (03-04-23 @ 22:31)    insulin lispro (ADMELOG) corrective regimen sliding scale   8 Unit(s) SubCutaneous (03-05-23 @ 09:22)    insulin lispro (ADMELOG) corrective regimen sliding scale   2 Unit(s) SubCutaneous (03-04-23 @ 22:34)    insulin lispro Injectable (ADMELOG)   5 Unit(s) SubCutaneous (03-05-23 @ 09:21)   5 Unit(s) SubCutaneous (03-04-23 @ 18:38)    predniSONE   Tablet   75 milliGRAM(s) Oral (03-05-23 @ 06:11)        PHYSICAL EXAM:  VITALS: T(C): 36.4 (03-05-23 @ 12:06)  T(F): 97.6 (03-05-23 @ 12:06), Max: 98.3 (03-04-23 @ 15:47)  HR: 99 (03-05-23 @ 12:06) (93 - 104)  BP: 102/65 (03-05-23 @ 12:06) (87/64 - 127/85)  RR:  (18 - 18)  SpO2:  (96% - 98%)  Wt(kg): --  GENERAL: male laying in bed in NAD  Respiratory: Respirations unlabored   Extremities: Warm, no edema  NEURO: Alert , appropriate     LABS:  POCT Blood Glucose.: 305 mg/dL (03-05-23 @ 08:47)  POCT Blood Glucose.: 292 mg/dL (03-04-23 @ 22:01)  POCT Blood Glucose.: 282 mg/dL (03-04-23 @ 18:30)  POCT Blood Glucose.: 254 mg/dL (03-04-23 @ 11:04)  POCT Blood Glucose.: 264 mg/dL (03-04-23 @ 07:22)  POCT Blood Glucose.: 447 mg/dL (03-04-23 @ 02:33)  POCT Blood Glucose.: 458 mg/dL (03-03-23 @ 19:31)  POCT Blood Glucose.: 495 mg/dL (03-03-23 @ 19:29)                          12.4   9.03  )-----------( 144      ( 05 Mar 2023 06:45 )             39.4     03-05    136  |  102  |  61<H>  ----------------------------<  290<H>  4.6   |  18<L>  |  2.85<H>    Ca    8.8      05 Mar 2023 06:45  Phos  5.1     03-05  Mg     2.6     03-05    TPro  6.3  /  Alb  2.9<L>  /  TBili  0.7  /  DBili  0.2  /  AST  25  /  ALT  23  /  AlkPhos  81  03-05    eGFR: 23 mL/min/1.73m2 (05 Mar 2023 06:45)    02-17 Chol 143 Direct LDL -- LDL calculated 51 HDL 69 Trig 116  Thyroid Function Tests:  03-03 @ 15:09 TSH 1.06 FreeT4 -- T3 -- Anti TPO -- Anti Thyroglobulin Ab -- TSI --      A1C with Estimated Average Glucose Result: 4.7 % (02-17-23 @ 11:59)    Estimated Average Glucose: 88 mg/dL (02-17-23 @ 11:59)        Diet, Regular:   Consistent Carbohydrate No Snacks (CSTCHO)  DASH/TLC Sodium & Cholesterol Restricted (DASH) (03-04-23 @ 11:54) [Active]              seen earlier today     Chief Complaint: steroid hyperglycemia, dm after transplant    INTERVAL HX: tolerating po endorses gets FS checked, insulin administered and finishes meals within 1 hours, denies snack at bedtime,denies snacking b/w meals. BG severely elevated to 300s this am. remains on pred 75mg po qd       Review of Systems:  General: As above  Cardiovascular: No chest pain  Respiratory: No SOB  GI: No nausea, vomiting  Endocrine: no  S&Sx of hypoglycemia    Allergies    No Known Allergies    Intolerances      MEDICATIONS  (STANDING):  albuterol/ipratropium for Nebulization 3 milliLiter(s) Nebulizer every 6 hours  apixaban 5 milliGRAM(s) Oral every 12 hours  aspirin enteric coated 81 milliGRAM(s) Oral daily  atorvastatin 10 milliGRAM(s) Oral at bedtime  cefepime   IVPB 1000 milliGRAM(s) IV Intermittent every 24 hours  dextrose 5%. 1000 milliLiter(s) (50 mL/Hr) IV Continuous <Continuous>  dextrose 5%. 1000 milliLiter(s) (100 mL/Hr) IV Continuous <Continuous>  dextrose 50% Injectable 25 Gram(s) IV Push once  dextrose 50% Injectable 12.5 Gram(s) IV Push once  dextrose 50% Injectable 25 Gram(s) IV Push once  dextrose Oral Gel 15 Gram(s) Oral once  folic acid 1 milliGRAM(s) Oral daily  glucagon  Injectable 1 milliGRAM(s) IntraMuscular once  guaiFENesin Oral Liquid (Sugar-Free) 200 milliGRAM(s) Oral every 6 hours  insulin glargine Injectable (LANTUS) 18 Unit(s) SubCutaneous at bedtime  insulin lispro (ADMELOG) corrective regimen sliding scale   SubCutaneous three times a day before meals  insulin lispro (ADMELOG) corrective regimen sliding scale   SubCutaneous at bedtime  insulin lispro Injectable (ADMELOG) 7 Unit(s) SubCutaneous three times a day before meals  lactated ringers. 1000 milliLiter(s) (1000 mL/Hr) IV Continuous <Continuous>  metoprolol succinate  milliGRAM(s) Oral daily  pantoprazole    Tablet 40 milliGRAM(s) Oral before breakfast  predniSONE   Tablet 75 milliGRAM(s) Oral daily  sodium chloride 3%  Inhalation 4 milliLiter(s) Inhalation every 12 hours  tacrolimus 2 milliGRAM(s) Oral once  trimethoprim   80 mG/sulfamethoxazole 400 mG 1 Tablet(s) Oral every 48 hours  valGANciclovir 450 milliGRAM(s) Oral <User Schedule>  vancomycin    Solution 125 milliGRAM(s) Oral every 12 hours      atorvastatin   10 milliGRAM(s) Oral (03-04-23 @ 22:33)    insulin glargine Injectable (LANTUS)   14 Unit(s) SubCutaneous (03-04-23 @ 22:31)    insulin lispro (ADMELOG) corrective regimen sliding scale   8 Unit(s) SubCutaneous (03-05-23 @ 09:22)    insulin lispro (ADMELOG) corrective regimen sliding scale   2 Unit(s) SubCutaneous (03-04-23 @ 22:34)    insulin lispro Injectable (ADMELOG)   5 Unit(s) SubCutaneous (03-05-23 @ 09:21)   5 Unit(s) SubCutaneous (03-04-23 @ 18:38)    predniSONE   Tablet   75 milliGRAM(s) Oral (03-05-23 @ 06:11)        PHYSICAL EXAM:  VITALS: T(C): 36.4 (03-05-23 @ 12:06)  T(F): 97.6 (03-05-23 @ 12:06), Max: 98.3 (03-04-23 @ 15:47)  HR: 99 (03-05-23 @ 12:06) (93 - 104)  BP: 102/65 (03-05-23 @ 12:06) (87/64 - 127/85)  RR:  (18 - 18)  SpO2:  (96% - 98%)  Wt(kg): --  GENERAL: male laying in bed in NAD  Respiratory: Respirations unlabored   Extremities: Warm, no edema  NEURO: Alert , appropriate     LABS:  POCT Blood Glucose.: 305 mg/dL (03-05-23 @ 08:47)  POCT Blood Glucose.: 292 mg/dL (03-04-23 @ 22:01)  POCT Blood Glucose.: 282 mg/dL (03-04-23 @ 18:30)  POCT Blood Glucose.: 254 mg/dL (03-04-23 @ 11:04)  POCT Blood Glucose.: 264 mg/dL (03-04-23 @ 07:22)  POCT Blood Glucose.: 447 mg/dL (03-04-23 @ 02:33)  POCT Blood Glucose.: 458 mg/dL (03-03-23 @ 19:31)  POCT Blood Glucose.: 495 mg/dL (03-03-23 @ 19:29)                          12.4   9.03  )-----------( 144      ( 05 Mar 2023 06:45 )             39.4     03-05    136  |  102  |  61<H>  ----------------------------<  290<H>  4.6   |  18<L>  |  2.85<H>    Ca    8.8      05 Mar 2023 06:45  Phos  5.1     03-05  Mg     2.6     03-05    TPro  6.3  /  Alb  2.9<L>  /  TBili  0.7  /  DBili  0.2  /  AST  25  /  ALT  23  /  AlkPhos  81  03-05    eGFR: 23 mL/min/1.73m2 (05 Mar 2023 06:45)    02-17 Chol 143 Direct LDL -- LDL calculated 51 HDL 69 Trig 116  Thyroid Function Tests:  03-03 @ 15:09 TSH 1.06 FreeT4 -- T3 -- Anti TPO -- Anti Thyroglobulin Ab -- TSI --      A1C with Estimated Average Glucose Result: 4.7 % (02-17-23 @ 11:59)    Estimated Average Glucose: 88 mg/dL (02-17-23 @ 11:59)        Diet, Regular:   Consistent Carbohydrate No Snacks (CSTCHO)  DASH/TLC Sodium & Cholesterol Restricted (DASH) (03-04-23 @ 11:54) [Active]

## 2023-03-05 NOTE — PROGRESS NOTE ADULT - ATTENDING COMMENTS
Yadira Shine MD  Division of Hospital Medicine  [facility]   Available on Microsoft Teams - messages preferred prior to calls.    Patient seen and examined today with Team 6 Resident and Interns. Agree with above findings, assessment, and plan with the following additions/exceptions:    Overall, 73 yo male with PMH of HTN, HLD, NICM, chronic systolic heart fialure s/p HM2 LVAD (6/2017) s/p OHT (hep C donor, treated) in 2/23/18 with post-op course c/b graft dysfunction treated with plasmapheresis, IVIG, and rituximab, hx of hemolytic anemia on prednisone who presented with chest tightness found to be in aflutter with RVR, sepsis 2/2 bacterial and hMPV pneumonia, and NORMAN.    Active Issues:  #Sepsis criteria met on admission  #Human Metapneumovirus infection  #New onset Aflutter with RVR  #NORMAN on CKD, improved  #Steroid Induced Hyperglycemia  #Acute Metabolic Encephalopathy, resolved  #s/p OHT in 2/23/2018  #Hx hemolytic anemia    Plan:  - found to be in new onset aflutter with RVR on admission, spontaneously to NSR and remains in sinus on tele  - c/w Toprol for rate control, eliquis for AC. EP consult appreciated - may benefit from ablation potentially   - met sepsis criteria on admission with fever, leukocytosis, elevated procal - improved  - RVP positive for hPMV - supportive care  - CT chest showing impacted RML and RLL bronchi with near complete collapse of RML and RLL, patchy areas of consolidation within RUL and LLL concerning for atelectasis vs. infection  - c/w cefepime started on 3/4, discontinued vanco today given negative MRSA PCR  - UA negative for infection  - f/u sputum cx - pending, blood cx - NGTD  - urine legionella Ag negative, cryptococcal Ag negative   - f/u fungitell, galactomannan Ag, histoplasma Ag, CMV PCR - pending  - c/w prednisone 75mg daily - as per heme, would maintain on this dose as previously had relapse of AIHA when steroids were tapered, as long as ID in agreement; however, per resident discussion with ID, would like to decrease if able. team to reach out to heme re: dose adjustment recs  - c/w bactrim and valganciclovir for ppx  - started on PO vanco by ID for c. diff ppx  - pulm consulted for utility on bronch given impaction and collapse of RML+RLL but recommending outpatient bronch when optimized  - basal/bolus insulin with sliding scale as per Endo recs  - NORMAN initially improved but again worse today with decrease in UOP. check urine studies, bladder scan for PVR, fluid challenge today and reassess Cr in AM  - renally dose meds to current GFR  - Transplant Cardiology, Transplant ID, EP, Heme/onc, Pulm, and Endo recs appreciated    Rest as detailed in note above.    Plan discussed with patient and Team 1 Intern Dr. [place]. Yadira Shine MD  Division of Hospital Medicine  [facility]   Available on Microsoft Teams - messages preferred prior to calls.    Patient seen and examined today with Team 6 Resident and Interns. Agree with above findings, assessment, and plan with the following additions/exceptions:    Overall, 71 yo male with PMH of HTN, HLD, NICM, chronic systolic heart fialure s/p HM2 LVAD (6/2017) s/p OHT (hep C donor, treated) in 2/23/18 with post-op course c/b graft dysfunction treated with plasmapheresis, IVIG, and rituximab, hx of hemolytic anemia on prednisone who presented with chest tightness found to be in aflutter with RVR, sepsis 2/2 bacterial and hMPV pneumonia, and NORMAN.    Active Issues:  #Sepsis criteria met on admission  #Human Metapneumovirus infection  #New onset Aflutter with RVR  #NORMAN on CKD, improved  #Steroid Induced Hyperglycemia  #Acute Metabolic Encephalopathy, resolved  #s/p OHT in 2/23/2018  #Hx hemolytic anemia    Plan:  - found to be in new onset aflutter with RVR on admission, spontaneously to NSR and remains in sinus on tele  - c/w Toprol for rate control, eliquis for AC. EP consult appreciated - may benefit from ablation potentially   - met sepsis criteria on admission with fever, leukocytosis, elevated procal - improved  - RVP positive for hPMV - supportive care  - CT chest showing impacted RML and RLL bronchi with near complete collapse of RML and RLL, patchy areas of consolidation within RUL and LLL concerning for atelectasis vs. infection  - c/w cefepime started on 3/4, discontinued vanco today given negative MRSA PCR  - UA negative for infection  - f/u sputum cx - pending, blood cx - NGTD  - urine legionella Ag negative, cryptococcal Ag negative   - f/u fungitell, galactomannan Ag, histoplasma Ag, CMV PCR - pending  - c/w prednisone 75mg daily - as per heme, would maintain on this dose as previously had relapse of AIHA when steroids were tapered, as long as ID in agreement; however, per resident discussion with ID, would like to decrease if able. team to reach out to heme re: dose adjustment recs  - c/w bactrim and valganciclovir for ppx  - started on PO vanco by ID for c. diff ppx  - pulm consulted for utility on bronch given impaction and collapse of RML+RLL but recommending outpatient bronch when optimized  - basal/bolus insulin with sliding scale as per Endo recs  - tacro level supratherapeutic, tacro re-dosed today 3/5. monitor tacro level daily adjust dose as needed  - NORMAN initially improved but again worse today with decrease in UOP. check urine studies, bladder scan for PVR, fluid challenge today and reassess Cr in AM  - renally dose meds to current GFR  - Transplant Cardiology, Transplant ID, EP, Heme/onc, Pulm, and Endo recs appreciated    Rest as detailed in note above.    Plan discussed with patient and Team 1 Intern Dr. [place].

## 2023-03-05 NOTE — PROGRESS NOTE ADULT - PROBLEM SELECTOR PLAN 2
Admitted w/ HR 140s, improved after giving home dose 200mg metoprolol succinate. EP consulted and determined that patient has new-onset A-flutter. TTE done, showed EF 50-55%, concentric LV remodeling, grossly normal LV and RV systolic function. Possible compensatory response 2/2 underlying infection as above.   Patient now in sinus tachycadia  - EP recs appreciated  - Dilt gtt to maintain HR goal 120   - continue home metoprolol succinate 200mg qd  - CHADSVASC 2; start anticoagulation w/ eliquis 5mg BID Admitted w/ HR 140s, improved after giving home dose 200mg metoprolol succinate. EP consulted and determined that patient has new-onset A-flutter. TTE done, showed EF 50-55%, concentric LV remodeling, grossly normal LV and RV systolic function. Possible compensatory response 2/2 underlying infection as above.   Patient now in sinus tachycadia  - EP recs appreciated  - continue home metoprolol succinate 200mg qd  - CHADSVASC 2; start anticoagulation w/ eliquis 5mg BID  - can trial lopressor push if HR >120s, then trial diltiazem push and transition to dilt drip

## 2023-03-05 NOTE — PROGRESS NOTE ADULT - PROBLEM SELECTOR PLAN 4
Patient AOx1 on admission, AOx3 by time of interview though slow to respond to questions, falling asleep intermittently during exam. Considering infection-mediated vs 2/2 hyperglycemia.  - CTH negative for acute pathology  - glycemic control as below; monitor for signs of DKA Patient AOx1 on admission, AOx3 by time of interview though slow to respond to questions, falling asleep intermittently during exam. Considering infection-mediated vs 2/2 hyperglycemia.  - CTH negative for acute pathology  - glycemic control as below; monitor for signs of DKA  - started on lantus 18 units and admelog 7 units TID per endocrine

## 2023-03-05 NOTE — PROGRESS NOTE ADULT - PROBLEM SELECTOR PLAN 7
s/p OHT in 2/23/18  - continue home Toprol  mg PO QD  - continue home ASA 81 and  pravastatin 20 mg PO QD  - continue home pantoprazole 40 mg PO QD    - HOLD losartan 100mg qd due to NORMAN, dilt gtt if HR >120s  - underwent annual RHC/EMBx with Dr. Carpenter in February 2023

## 2023-03-05 NOTE — PROGRESS NOTE ADULT - PROBLEM SELECTOR PLAN 8
Continue home tacrolimus 5mg qd; target level 6-8  - f/u tacro level  Continue prednisone 75mg qd  Prophylaxis:   - Bactrim --> f/u transplant ID re correct dosing  - Valganciclovir 450mg 2tabs daily  - Pantoprazole 40mg qd  Off cellcept due to leukopenia, recurrent infections Continue home tacrolimus 5mg qd; target level 6-8  - f/u tacro level  Continue prednisone 75mg qd  Prophylaxis:   - Bactrim every other day   - Valganciclovir 450mg two times weekly   - Pantoprazole 40mg qd  - vancomycin oral   Off cellcept due to leukopenia, recurrent infections

## 2023-03-05 NOTE — PROGRESS NOTE ADULT - ASSESSMENT
72 year old male with PMH of HTN, HLD, NICM, chronic systolic heart failure s/p HM2 LVAD (6/2017) s/p heart transplant from Hep. C donor (treated) 2/23/18 (post op course complicated by graft dysfunction treated by plasmapheresis, IVIG, and rituximab), presenting w/ chest tightness x1d found to be in aflutter 2/2 pneumonia. Endocrinology consulted for management of diabetes.    Hyperglycemia post transplant  Steroid hyperglycemia  - HbA1c: 4.7%  - Home Regimen: does not take medication  - Endocrinologist: does not have  - receiving prednisone 75mg daily starting 3/4 for hemolytic anemia  - appears has been on lantus 14, admelog 5/7/7 in the past while on steroids  PLAN  - FBG above goal ,increase to  Lantus 18  units at bedtime. DO NOT HOLD IF NPO.  - Prandial BG above goal, Increase to  Admelog 7 units TID pre-meal. HOLD IF NPO.  - Use moderate dose Admelog correction scale pre-meal  - Use moderate dose Admelog correction scale at bedtime/2am   - Fingerstick BG before meals and bedtime  - Goal -180  - Carbohydrate consistent diet  Discharge plan:  - Discharge medications: pending steroid plan  - Patient to call doctor with persistent high or low BG at home.   - Ensure patient has glucometer, test strips and lancets on discharge.  - Recommend routine outpatient ophthalmology, podiatry and endocrinology f/u    HTN  - Home regimen: metoprolol succinate 200mg daily, losartan 100mg daily  PLAN  - Can check urine microalbumin/cr ratio as outpatient  - Outpatient goal BP <130/80. Management per primary team.    HLD  - Home regimen: pravastatin 20mg daily  PLAN  - Continue atorvastatin 10mg daily per primary team  - Can check lipid profile if not done recently      Contact via Microsoft Teams during business hours  To reach covering provider access AMION via sunrise tools  For Urgent matters/after-hours/weekends/holidays please page endocrine fellow on call   For nonurgent matters please email [email or web address]    Please note that this patient may be followed by different provider tomorrow.  Notify endocrine 24 hours prior to discharge for final recommendations    greater than 50% of the encounter was spent counseling and/or coordination of care.  26 minutes spent on total encounter; The necessity of the time spent during the encounter on this date of service was due to development of plan of care/coordination of care/glycemic control through review of labs, blood glucose values and vital signs.  72 year old male with PMH of HTN, HLD, NICM, chronic systolic heart failure s/p HM2 LVAD (6/2017) s/p heart transplant from Hep. C donor (treated) 2/23/18 (post op course complicated by graft dysfunction treated by plasmapheresis, IVIG, and rituximab), presenting w/ chest tightness x1d found to be in aflutter 2/2 pneumonia. Endocrinology consulted for management of diabetes.    Hyperglycemia post transplant  Steroid hyperglycemia  - HbA1c: 4.7%  - Home Regimen: does not take medication  - Endocrinologist: does not have  - receiving prednisone 75mg daily starting 3/4 for hemolytic anemia  - appears has been on lantus 14, admelog 5/7/7 in the past while on pred 60 in past  -NORMAN cautious insulin titration   PLAN  - FBG above goal ,increase to  Lantus 18  units at bedtime. DO NOT HOLD IF NPO.  - Prandial BG above goal, Increase to  Admelog 7 units TID pre-meal. HOLD IF NPO.  - Use moderate dose Admelog correction scale pre-meal  - Use moderate dose Admelog correction scale at bedtime/2am   - Fingerstick BG before meals and bedtime  - Goal -180  - Carbohydrate consistent diet  Discharge plan:  - Discharge medications: pending steroid plan  - Patient to call doctor with persistent high or low BG at home.   - Ensure patient has glucometer, test strips and lancets on discharge.  - Recommend routine outpatient ophthalmology, podiatry and endocrinology f/u    HTN  - Home regimen: metoprolol succinate 200mg daily, losartan 100mg daily  PLAN  - Can check urine microalbumin/cr ratio as outpatient  - Outpatient goal BP <130/80. Management per primary team.    HLD  - Home regimen: pravastatin 20mg daily  PLAN  - Continue atorvastatin 10mg daily per primary team  - Can check lipid profile if not done recently      Contact via Microsoft Teams during business hours  To reach covering provider access AMION via sunrise tools  For Urgent matters/after-hours/weekends/holidays please page endocrine fellow on call   For nonurgent matters please email [email or web address]    Please note that this patient may be followed by different provider tomorrow.  Notify endocrine 24 hours prior to discharge for final recommendations    greater than 50% of the encounter was spent counseling and/or coordination of care.  26 minutes spent on total encounter; The necessity of the time spent during the encounter on this date of service was due to development of plan of care/coordination of care/glycemic control through review of labs, blood glucose values and vital signs.     addendum- lunch BG remains 300s, increase admelog to 10 starting with lunch today

## 2023-03-05 NOTE — PATIENT PROFILE ADULT - FALL HARM RISK - RISK INTERVENTIONS
Assistance OOB with selected safe patient handling equipment/Assistance with ambulation/Communicate Fall Risk and Risk Factors to all staff, patient, and family/Discuss with provider need for PT consult/Monitor gait and stability/Provide patient with walking aids - walker, cane, crutches/Reinforce activity limits and safety measures with patient and family/Visual Cue: Yellow wristband/Bed in lowest position, wheels locked, appropriate side rails in place/Call bell, personal items and telephone in reach/Instruct patient to call for assistance before getting out of bed or chair/Non-slip footwear when patient is out of bed/[place] to call system/Physically safe environment - no spills, clutter or unnecessary equipment/Purposeful Proactive Rounding/Room/bathroom lighting operational, light cord in reach

## 2023-03-05 NOTE — PATIENT PROFILE ADULT - FUNCTIONAL ASSESSMENT - BASIC MOBILITY 6.
3-calculated by average/Not able to assess (calculate score using [facility] averaging method)

## 2023-03-05 NOTE — PROGRESS NOTE ADULT - ASSESSMENT
73 y/o male with hx of nonischemic cardiomyopathy s/p HM2 LVAD, underwent OHT 2/23/18 with hepatitis C donor, hx of hemolytic anemia presented w/ 1d chest tightness, found to have new onset aflutter w/ RVR,  PNA w/ course c/b encephalopathy.  71 y/o male with hx of nonischemic cardiomyopathy s/p HM2 LVAD, underwent OHT 2/23/18 with hepatitis C donor, hx of hemolytic anemia presented w/ 1d chest tightness, found to have new onset aflutter w/ RVR, Patient found to have human metapneumovirus with likely superimposed bacterial infection given elevated procal and CT findings of RLL and RML collapse

## 2023-03-05 NOTE — PROGRESS NOTE ADULT - PROBLEM SELECTOR PLAN 11
Diet: DASH, Carb consistent  DVT: Eliquis 5mg BID  Dispo: Pending Clinical improvement  Code Status: Full Code     Patient ask that we do not discuss this case with his family at this time.

## 2023-03-05 NOTE — PROGRESS NOTE ADULT - PROBLEM SELECTOR PLAN 9
Followed by [facility]red as outpatient; admitted 6.5 in Jan 2023, treated w/ 4d IV dexamethasone.   Per hematology, recommended to avoid blood transfusion unless patient is symptomatic due to risk of hyperhemolysis. Transfusion is okay from heart transplant perspective however per blood bank It will need to be emergent.   - maintain active T/S  - Hematology consulted for help with steroid management in setting of acute infection; f/u recs  - f/u hemolysis labs Followed by [facility] as outpatient; admitted 6.5 in Jan 2023, treated w/ 4d IV dexamethasone.   Per hematology, recommended to avoid blood transfusion unless patient is symptomatic due to risk of hyperhemolysis. Transfusion is okay from heart transplant perspective however per blood bank It will need to be emergent.   - maintain active T/S  - Hematology consulted for help with steroid management in setting of acute infection; f/u recs  - labs not concerning for hemolysis

## 2023-03-05 NOTE — PROGRESS NOTE ADULT - PROBLEM SELECTOR PLAN 6
Cr elevated to 2 on admission, down to 1.8 following 1L IVF. Considering sepsis mediated ATN vs prerenal NORMAN.   Now at baseline  - monitor Cr, renally dose medications  - holding losartan; can restart as needed for bp control Cr elevated to 2 on admission, down to 1.8 following 1L IVF. Considering sepsis mediated ATN vs prerenal NORMAN.   Now at baseline  - monitor Cr, renally dose medications  - holding losartan; can restart as needed for bp control  - s/p 1 L bolus; f/u Post void residual

## 2023-03-05 NOTE — PROGRESS NOTE ADULT - PROBLEM SELECTOR PLAN 5
Prednisone-induced hyperglycemia w/ blood glucose elevated to 400-500 on admission. Based on outpatient records, patient has been hyperglycemic in the past, last A1c 4.7; not currently on any JAVIER or insulin. Not given any insulin in ED.   - BHB wnl, anion gap wnl  - given 5u regular insulin;  at the time  - increase standing insulin based on 24h requirements  - started ISS, hypoglycemia protocol  - endocrine consulted for hyperglycemia

## 2023-03-05 NOTE — PROGRESS NOTE ADULT - PROBLEM SELECTOR PLAN 3
Meeting sepsis criteria w/ tachycardia to 140s, RR 24, oral temp 100.1 --> likely higher rectal temp, w/ PNA as possible source given symptoms, CT chest findings.   - treatment of PNA as above: vanc and cefepime Meeting sepsis criteria w/ tachycardia to 140s, RR 24, oral temp 100.1 --> likely higher rectal temp, w/ PNA as possible source given symptoms, CT chest findings.   - treatment of PNA as above: vanc and cefepime  - f/u transplant ID recs

## 2023-03-05 NOTE — PROGRESS NOTE ADULT - SUBJECTIVE AND OBJECTIVE BOX
INTERVAL HPI/OVERNIGHT EVENTS:    SUBJECTIVE: Patient seen and examined at bedside.    OBJECTIVE:    VITAL SIGNS:  ICU Vital Signs Last 24 Hrs  T(C): 36.4 (05 Mar 2023 04:29), Max: 36.8 (04 Mar 2023 15:47)  T(F): 97.5 (05 Mar 2023 04:29), Max: 98.3 (04 Mar 2023 15:47)  HR: 103 (05 Mar 2023 04:29) (93 - 104)  BP: 107/75 (05 Mar 2023 04:29) (87/64 - 127/85)  BP(mean): --  ABP: --  ABP(mean): --  RR: 18 (05 Mar 2023 04:29) (18 - 18)  SpO2: 98% (05 Mar 2023 04:) (96% - 98%)    O2 Parameters below as of 05 Mar 2023 04:29  Patient On (Oxygen Delivery Method): room air              CAPILLARY BLOOD GLUCOSE      POCT Blood Glucose.: 292 mg/dL (04 Mar 2023 22:01)      PHYSICAL EXAM:    General: NAD  HEENT: NC/AT; PERRL, clear conjunctiva  Neck: supple  Respiratory: CTA b/l  Cardiovascular: +S1/S2; RRR  Abdomen: soft, NT/ND; +BS x4  Extremities:  no LE edema  Vascular: WWP, 2+ peripheral pulses b/l;  Skin: normal color and turgor; no rash  Neurological: A&Ox3, move all extremities. CN II-XII intact    MEDICATIONS:  MEDICATIONS  (STANDING):  albuterol/ipratropium for Nebulization 3 milliLiter(s) Nebulizer every 6 hours  apixaban 5 milliGRAM(s) Oral every 12 hours  aspirin enteric coated 81 milliGRAM(s) Oral daily  atorvastatin 10 milliGRAM(s) Oral at bedtime  cefepime   IVPB 1000 milliGRAM(s) IV Intermittent every 12 hours  dextrose 5%. 1000 milliLiter(s) (50 mL/Hr) IV Continuous <Continuous>  dextrose 5%. 1000 milliLiter(s) (100 mL/Hr) IV Continuous <Continuous>  dextrose 50% Injectable 25 Gram(s) IV Push once  dextrose 50% Injectable 12.5 Gram(s) IV Push once  dextrose 50% Injectable 25 Gram(s) IV Push once  dextrose Oral Gel 15 Gram(s) Oral once  folic acid 1 milliGRAM(s) Oral daily  glucagon  Injectable 1 milliGRAM(s) IntraMuscular once  guaiFENesin Oral Liquid (Sugar-Free) 200 milliGRAM(s) Oral every 6 hours  insulin glargine Injectable (LANTUS) 14 Unit(s) SubCutaneous at bedtime  insulin lispro (ADMELOG) corrective regimen sliding scale   SubCutaneous three times a day before meals  insulin lispro (ADMELOG) corrective regimen sliding scale   SubCutaneous at bedtime  insulin lispro Injectable (ADMELOG) 5 Unit(s) SubCutaneous three times a day before meals  metoprolol succinate  milliGRAM(s) Oral daily  pantoprazole    Tablet 40 milliGRAM(s) Oral before breakfast  predniSONE   Tablet 75 milliGRAM(s) Oral daily  sodium chloride 3%  Inhalation 4 milliLiter(s) Inhalation every 12 hours  tacrolimus 5 milliGRAM(s) Oral <User Schedule>  trimethoprim   80 mG/sulfamethoxazole 400 mG 1 Tablet(s) Oral every 48 hours  valGANciclovir 450 milliGRAM(s) Oral every 48 hours  vancomycin  IVPB 1000 milliGRAM(s) IV Intermittent every 24 hours    MEDICATIONS  (PRN):  acetaminophen     Tablet .. 650 milliGRAM(s) Oral every 6 hours PRN Temp greater or equal to 38C (100.4F), Mild Pain (1 - 3)  melatonin 3 milliGRAM(s) Oral at bedtime PRN Insomnia      ALLERGIES:  Allergies    No Known Allergies    Intolerances        LABS:                        11.1   6.24  )-----------( 109      ( 04 Mar 2023 00:46 )             34.2     03-04    x   |  x   |  x   ----------------------------<  268<H>  x    |  x   |  x     Ca    7.7<L>      04 Mar 2023 00:46  Mg     1.7     03-04    TPro  5.1<L>  /  Alb  2.4<L>  /  TBili  1.3<H>  /  DBili  x   /  AST  32  /  ALT  24  /  AlkPhos  53  03-04      Urinalysis Basic - ( 03 Mar 2023 22:35 )    Color: Yellow / Appearance: Slightly Turbid / S.027 / pH: x  Gluc: x / Ketone: Negative  / Bili: Negative / Urobili: Negative   Blood: x / Protein: 30 mg/dL / Nitrite: Negative   Leuk Esterase: Negative / RBC: 1 /hpf / WBC 4 /HPF   Sq Epi: x / Non Sq Epi: 3 /hpf / Bacteria: Negative        RADIOLOGY & ADDITIONAL TESTS: Reviewed. INTERVAL HPI/OVERNIGHT EVENTS: KEVIN OVN    SUBJECTIVE: Patient seen and examined at bedside. This AM, patient reports adequate urine output. Denies palpitations, chest pain, chest tightness, SOB this AM.     OBJECTIVE:    VITAL SIGNS:  ICU Vital Signs Last 24 Hrs  T(C): 36.4 (05 Mar 2023 04:29), Max: 36.8 (04 Mar 2023 15:47)  T(F): 97.5 (05 Mar 2023 04:29), Max: 98.3 (04 Mar 2023 15:47)  HR: 103 (05 Mar 2023 04:29) (93 - 104)  BP: 107/75 (05 Mar 2023 04:29) (87/64 - 127/85)  BP(mean): --  ABP: --  ABP(mean): --  RR: 18 (05 Mar 2023 04:29) (18 - 18)  SpO2: 98% (05 Mar 2023 04:29) (96% - 98%)    O2 Parameters below as of 05 Mar 2023 04:29  Patient On (Oxygen Delivery Method): room air              CAPILLARY BLOOD GLUCOSE      POCT Blood Glucose.: 292 mg/dL (04 Mar 2023 22:01)      PHYSICAL EXAM:    General: NAD  HEENT: NC/AT; PERRL, clear conjunctiva  Neck: supple  Respiratory: decreased breath sounds at right side   Cardiovascular: +S1/S2; RRR  Abdomen: soft, NT/ND; +BS x4  Extremities:  no LE edema  Vascular: WWP, 2+ peripheral pulses b/l;  Skin: normal color and turgor; no rash  Neurological: A&Ox3, move all extremities. CN II-XII intact    MEDICATIONS:  MEDICATIONS  (STANDING):  albuterol/ipratropium for Nebulization 3 milliLiter(s) Nebulizer every 6 hours  apixaban 5 milliGRAM(s) Oral every 12 hours  aspirin enteric coated 81 milliGRAM(s) Oral daily  atorvastatin 10 milliGRAM(s) Oral at bedtime  cefepime   IVPB 1000 milliGRAM(s) IV Intermittent every 12 hours  dextrose 5%. 1000 milliLiter(s) (50 mL/Hr) IV Continuous <Continuous>  dextrose 5%. 1000 milliLiter(s) (100 mL/Hr) IV Continuous <Continuous>  dextrose 50% Injectable 25 Gram(s) IV Push once  dextrose 50% Injectable 12.5 Gram(s) IV Push once  dextrose 50% Injectable 25 Gram(s) IV Push once  dextrose Oral Gel 15 Gram(s) Oral once  folic acid 1 milliGRAM(s) Oral daily  glucagon  Injectable 1 milliGRAM(s) IntraMuscular once  guaiFENesin Oral Liquid (Sugar-Free) 200 milliGRAM(s) Oral every 6 hours  insulin glargine Injectable (LANTUS) 14 Unit(s) SubCutaneous at bedtime  insulin lispro (ADMELOG) corrective regimen sliding scale   SubCutaneous three times a day before meals  insulin lispro (ADMELOG) corrective regimen sliding scale   SubCutaneous at bedtime  insulin lispro Injectable (ADMELOG) 5 Unit(s) SubCutaneous three times a day before meals  metoprolol succinate  milliGRAM(s) Oral daily  pantoprazole    Tablet 40 milliGRAM(s) Oral before breakfast  predniSONE   Tablet 75 milliGRAM(s) Oral daily  sodium chloride 3%  Inhalation 4 milliLiter(s) Inhalation every 12 hours  tacrolimus 5 milliGRAM(s) Oral <User Schedule>  trimethoprim   80 mG/sulfamethoxazole 400 mG 1 Tablet(s) Oral every 48 hours  valGANciclovir 450 milliGRAM(s) Oral every 48 hours  vancomycin  IVPB 1000 milliGRAM(s) IV Intermittent every 24 hours    MEDICATIONS  (PRN):  acetaminophen     Tablet .. 650 milliGRAM(s) Oral every 6 hours PRN Temp greater or equal to 38C (100.4F), Mild Pain (1 - 3)  melatonin 3 milliGRAM(s) Oral at bedtime PRN Insomnia      ALLERGIES:  Allergies    No Known Allergies    Intolerances        LABS:                        11.1   6.24  )-----------( 109      ( 04 Mar 2023 00:46 )             34.2     03-04    x   |  x   |  x   ----------------------------<  268<H>  x    |  x   |  x     Ca    7.7<L>      04 Mar 2023 00:46  Mg     1.7     03-04    TPro  5.1<L>  /  Alb  2.4<L>  /  TBili  1.3<H>  /  DBili  x   /  AST  32  /  ALT  24  /  AlkPhos  53  03-04      Urinalysis Basic - ( 03 Mar 2023 22:35 )    Color: Yellow / Appearance: Slightly Turbid / S.027 / pH: x  Gluc: x / Ketone: Negative  / Bili: Negative / Urobili: Negative   Blood: x / Protein: 30 mg/dL / Nitrite: Negative   Leuk Esterase: Negative / RBC: 1 /hpf / WBC 4 /HPF   Sq Epi: x / Non Sq Epi: 3 /hpf / Bacteria: Negative        RADIOLOGY & ADDITIONAL TESTS: Reviewed.

## 2023-03-06 LAB
ANION GAP SERPL CALC-SCNC: 13 MMOL/L — SIGNIFICANT CHANGE UP (ref 5–17)
APPEARANCE UR: ABNORMAL
BACTERIA # UR AUTO: NEGATIVE — SIGNIFICANT CHANGE UP
BASOPHILS # BLD AUTO: 0.01 K/UL — SIGNIFICANT CHANGE UP (ref 0–0.2)
BASOPHILS NFR BLD AUTO: 0.1 % — SIGNIFICANT CHANGE UP (ref 0–2)
BILIRUB UR-MCNC: NEGATIVE — SIGNIFICANT CHANGE UP
BUN SERPL-MCNC: 76 MG/DL — HIGH (ref 7–23)
CALCIUM SERPL-MCNC: 8.3 MG/DL — LOW (ref 8.4–10.5)
CHLORIDE SERPL-SCNC: 103 MMOL/L — SIGNIFICANT CHANGE UP (ref 96–108)
CMV DNA CSF QL NAA+PROBE: SIGNIFICANT CHANGE UP
CMV DNA SPEC NAA+PROBE-LOG#: SIGNIFICANT CHANGE UP LOG10IU/ML
CO2 SERPL-SCNC: 17 MMOL/L — LOW (ref 22–31)
COLOR SPEC: YELLOW — SIGNIFICANT CHANGE UP
CREAT SERPL-MCNC: 3.11 MG/DL — HIGH (ref 0.5–1.3)
DIFF PNL FLD: ABNORMAL
EGFR: 20 ML/MIN/1.73M2 — LOW
EOSINOPHIL # BLD AUTO: 0 K/UL — SIGNIFICANT CHANGE UP (ref 0–0.5)
EOSINOPHIL NFR BLD AUTO: 0 % — SIGNIFICANT CHANGE UP (ref 0–6)
EPI CELLS # UR: 2 /HPF — SIGNIFICANT CHANGE UP
GLUCOSE BLDC GLUCOMTR-MCNC: 129 MG/DL — HIGH (ref 70–99)
GLUCOSE BLDC GLUCOMTR-MCNC: 133 MG/DL — HIGH (ref 70–99)
GLUCOSE BLDC GLUCOMTR-MCNC: 152 MG/DL — HIGH (ref 70–99)
GLUCOSE BLDC GLUCOMTR-MCNC: 194 MG/DL — HIGH (ref 70–99)
GLUCOSE BLDC GLUCOMTR-MCNC: 233 MG/DL — HIGH (ref 70–99)
GLUCOSE BLDC GLUCOMTR-MCNC: 291 MG/DL — HIGH (ref 70–99)
GLUCOSE SERPL-MCNC: 247 MG/DL — HIGH (ref 70–99)
GLUCOSE UR QL: NEGATIVE — SIGNIFICANT CHANGE UP
HCT VFR BLD CALC: 36.8 % — LOW (ref 39–50)
HGB BLD-MCNC: 11.6 G/DL — LOW (ref 13–17)
HYALINE CASTS # UR AUTO: 2 /LPF — SIGNIFICANT CHANGE UP (ref 0–7)
IMM GRANULOCYTES NFR BLD AUTO: 0.9 % — SIGNIFICANT CHANGE UP (ref 0–0.9)
KETONES UR-MCNC: SIGNIFICANT CHANGE UP
LEUKOCYTE ESTERASE UR-ACNC: NEGATIVE — SIGNIFICANT CHANGE UP
LYMPHOCYTES # BLD AUTO: 0.39 K/UL — LOW (ref 1–3.3)
LYMPHOCYTES # BLD AUTO: 4.2 % — LOW (ref 13–44)
MAGNESIUM SERPL-MCNC: 2.5 MG/DL — SIGNIFICANT CHANGE UP (ref 1.6–2.6)
MCHC RBC-ENTMCNC: 31.5 GM/DL — LOW (ref 32–36)
MCHC RBC-ENTMCNC: 33.1 PG — SIGNIFICANT CHANGE UP (ref 27–34)
MCV RBC AUTO: 105.1 FL — HIGH (ref 80–100)
MONOCYTES # BLD AUTO: 0.24 K/UL — SIGNIFICANT CHANGE UP (ref 0–0.9)
MONOCYTES NFR BLD AUTO: 2.6 % — SIGNIFICANT CHANGE UP (ref 2–14)
NEUTROPHILS # BLD AUTO: 8.58 K/UL — HIGH (ref 1.8–7.4)
NEUTROPHILS NFR BLD AUTO: 92.2 % — HIGH (ref 43–77)
NITRITE UR-MCNC: NEGATIVE — SIGNIFICANT CHANGE UP
NRBC # BLD: 0 /100 WBCS — SIGNIFICANT CHANGE UP (ref 0–0)
PH UR: 5.5 — SIGNIFICANT CHANGE UP (ref 5–8)
PHOSPHATE SERPL-MCNC: 4.8 MG/DL — HIGH (ref 2.5–4.5)
PLATELET # BLD AUTO: 161 K/UL — SIGNIFICANT CHANGE UP (ref 150–400)
POTASSIUM SERPL-MCNC: 4.4 MMOL/L — SIGNIFICANT CHANGE UP (ref 3.5–5.3)
POTASSIUM SERPL-SCNC: 4.4 MMOL/L — SIGNIFICANT CHANGE UP (ref 3.5–5.3)
PROT UR-MCNC: ABNORMAL
RBC # BLD: 3.5 M/UL — LOW (ref 4.2–5.8)
RBC # FLD: 15.9 % — HIGH (ref 10.3–14.5)
RBC CASTS # UR COMP ASSIST: 1 /HPF — SIGNIFICANT CHANGE UP (ref 0–4)
SODIUM SERPL-SCNC: 133 MMOL/L — LOW (ref 135–145)
SP GR SPEC: 1.02 — SIGNIFICANT CHANGE UP (ref 1.01–1.02)
TACROLIMUS SERPL-MCNC: 14 NG/ML — SIGNIFICANT CHANGE UP
UROBILINOGEN FLD QL: NEGATIVE — SIGNIFICANT CHANGE UP
WBC # BLD: 9.3 K/UL — SIGNIFICANT CHANGE UP (ref 3.8–10.5)
WBC # FLD AUTO: 9.3 K/UL — SIGNIFICANT CHANGE UP (ref 3.8–10.5)
WBC UR QL: 1 /HPF — SIGNIFICANT CHANGE UP (ref 0–5)

## 2023-03-06 PROCEDURE — 99233 SBSQ HOSP IP/OBS HIGH 50: CPT

## 2023-03-06 PROCEDURE — 99232 SBSQ HOSP IP/OBS MODERATE 35: CPT

## 2023-03-06 PROCEDURE — 99233 SBSQ HOSP IP/OBS HIGH 50: CPT | Mod: GC

## 2023-03-06 RX ORDER — TACROLIMUS 5 MG/1
0.5 CAPSULE ORAL
Refills: 0 | Status: DISCONTINUED | OUTPATIENT
Start: 2023-03-07 | End: 2023-03-10

## 2023-03-06 RX ORDER — INSULIN LISPRO 100/ML
6 VIAL (ML) SUBCUTANEOUS
Refills: 0 | Status: DISCONTINUED | OUTPATIENT
Start: 2023-03-06 | End: 2023-03-09

## 2023-03-06 RX ORDER — SODIUM CHLORIDE 9 MG/ML
1000 INJECTION, SOLUTION INTRAVENOUS
Refills: 0 | Status: DISCONTINUED | OUTPATIENT
Start: 2023-03-06 | End: 2023-03-07

## 2023-03-06 RX ORDER — INSULIN LISPRO 100/ML
12 VIAL (ML) SUBCUTANEOUS
Refills: 0 | Status: DISCONTINUED | OUTPATIENT
Start: 2023-03-07 | End: 2023-03-07

## 2023-03-06 RX ORDER — INSULIN GLARGINE 100 [IU]/ML
20 INJECTION, SOLUTION SUBCUTANEOUS AT BEDTIME
Refills: 0 | Status: DISCONTINUED | OUTPATIENT
Start: 2023-03-06 | End: 2023-03-11

## 2023-03-06 RX ORDER — INSULIN LISPRO 100/ML
4 VIAL (ML) SUBCUTANEOUS
Refills: 0 | Status: DISCONTINUED | OUTPATIENT
Start: 2023-03-06 | End: 2023-03-12

## 2023-03-06 RX ORDER — NYSTATIN 500MM UNIT
500000 POWDER (EA) MISCELLANEOUS
Refills: 0 | Status: DISCONTINUED | OUTPATIENT
Start: 2023-03-06 | End: 2023-03-18

## 2023-03-06 RX ADMIN — Medication 1 TABLET(S): at 23:37

## 2023-03-06 RX ADMIN — Medication 4: at 09:01

## 2023-03-06 RX ADMIN — Medication 200 MILLIGRAM(S): at 11:33

## 2023-03-06 RX ADMIN — Medication 200 MILLIGRAM(S): at 05:42

## 2023-03-06 RX ADMIN — Medication 3 MILLILITER(S): at 05:45

## 2023-03-06 RX ADMIN — Medication 6: at 13:13

## 2023-03-06 RX ADMIN — Medication 3 MILLILITER(S): at 17:34

## 2023-03-06 RX ADMIN — TACROLIMUS 3 MILLIGRAM(S): 5 CAPSULE ORAL at 08:13

## 2023-03-06 RX ADMIN — Medication 75 MILLIGRAM(S): at 05:42

## 2023-03-06 RX ADMIN — CEFEPIME 100 MILLIGRAM(S): 1 INJECTION, POWDER, FOR SOLUTION INTRAMUSCULAR; INTRAVENOUS at 17:08

## 2023-03-06 RX ADMIN — Medication 4 UNIT(S): at 17:10

## 2023-03-06 RX ADMIN — APIXABAN 5 MILLIGRAM(S): 2.5 TABLET, FILM COATED ORAL at 17:09

## 2023-03-06 RX ADMIN — SODIUM CHLORIDE 100 MILLILITER(S): 9 INJECTION, SOLUTION INTRAVENOUS at 19:46

## 2023-03-06 RX ADMIN — PANTOPRAZOLE SODIUM 40 MILLIGRAM(S): 20 TABLET, DELAYED RELEASE ORAL at 05:42

## 2023-03-06 RX ADMIN — SODIUM CHLORIDE 4 MILLILITER(S): 9 INJECTION INTRAMUSCULAR; INTRAVENOUS; SUBCUTANEOUS at 17:09

## 2023-03-06 RX ADMIN — Medication 500000 UNIT(S): at 19:46

## 2023-03-06 RX ADMIN — Medication 200 MILLIGRAM(S): at 05:43

## 2023-03-06 RX ADMIN — SODIUM CHLORIDE 4 MILLILITER(S): 9 INJECTION INTRAMUSCULAR; INTRAVENOUS; SUBCUTANEOUS at 05:44

## 2023-03-06 RX ADMIN — APIXABAN 5 MILLIGRAM(S): 2.5 TABLET, FILM COATED ORAL at 05:42

## 2023-03-06 RX ADMIN — Medication 1 MILLIGRAM(S): at 11:33

## 2023-03-06 RX ADMIN — INSULIN GLARGINE 20 UNIT(S): 100 INJECTION, SOLUTION SUBCUTANEOUS at 23:37

## 2023-03-06 RX ADMIN — Medication 3 MILLILITER(S): at 11:33

## 2023-03-06 RX ADMIN — Medication 200 MILLIGRAM(S): at 17:07

## 2023-03-06 RX ADMIN — Medication 200 MILLIGRAM(S): at 23:32

## 2023-03-06 RX ADMIN — Medication 10 UNIT(S): at 09:01

## 2023-03-06 RX ADMIN — Medication 81 MILLIGRAM(S): at 11:34

## 2023-03-06 RX ADMIN — Medication 3 MILLILITER(S): at 23:33

## 2023-03-06 RX ADMIN — Medication 6 UNIT(S): at 13:13

## 2023-03-06 RX ADMIN — Medication 500000 UNIT(S): at 16:16

## 2023-03-06 RX ADMIN — Medication 125 MILLIGRAM(S): at 17:07

## 2023-03-06 RX ADMIN — Medication 200 MILLIGRAM(S): at 00:28

## 2023-03-06 RX ADMIN — Medication 125 MILLIGRAM(S): at 05:43

## 2023-03-06 RX ADMIN — ATORVASTATIN CALCIUM 10 MILLIGRAM(S): 80 TABLET, FILM COATED ORAL at 23:32

## 2023-03-06 NOTE — PROGRESS NOTE ADULT - SUBJECTIVE AND OBJECTIVE BOX
Diabetes Follow up note:    Chief complaint: Steroid induced hyperglycemia    Interval Hx: Pt w/hypoglycemia to 50s at bedtime last night. Pt seen at bedside. Reports good appetite and eating most of all of the meals. Was not on insulin at home prior to starting on prednisone.     Review of Systems:  General: denies pain  GI: Tolerating POs. Denies N/V/D/Abd pain  CV: Denies CP/SOB  ENDO: No S&Sx of hypoglycemia    MEDS:  atorvastatin 10 milliGRAM(s) Oral at bedtime  insulin glargine Injectable (LANTUS) 20 Unit(s) SubCutaneous at bedtime  insulin lispro (ADMELOG) corrective regimen sliding scale   SubCutaneous <User Schedule>  insulin lispro (ADMELOG) corrective regimen sliding scale   SubCutaneous three times a day before meals  insulin lispro Injectable (ADMELOG) 10 Unit(s) SubCutaneous before breakfast  insulin lispro Injectable (ADMELOG) 4 Unit(s) SubCutaneous before dinner  insulin lispro Injectable (ADMELOG) 6 Unit(s) SubCutaneous before lunch  predniSONE   Tablet 75 milliGRAM(s) Oral daily    cefepime   IVPB 1000 milliGRAM(s) IV Intermittent every 24 hours  trimethoprim   80 mG/sulfamethoxazole 400 mG 1 Tablet(s) Oral every 48 hours  valGANciclovir 450 milliGRAM(s) Oral <User Schedule>  vancomycin    Solution 125 milliGRAM(s) Oral every 12 hours    Allergies    No Known Allergies      PE:  General: Male ly9ing in bed. NAD>   Vital Signs Last 24 Hrs  T(C): 36.5 (06 Mar 2023 05:39), Max: 36.5 (06 Mar 2023 05:39)  T(F): 97.7 (06 Mar 2023 05:39), Max: 97.7 (06 Mar 2023 05:39)  HR: 99 (06 Mar 2023 05:39) (99 - 109)  BP: 122/86 (06 Mar 2023 05:39) (102/65 - 122/86)  BP(mean): --  RR: 18 (06 Mar 2023 05:39) (18 - 18)  SpO2: 96% (06 Mar 2023 05:39) (96% - 97%)    Parameters below as of 06 Mar 2023 05:39  Patient On (Oxygen Delivery Method): room air      Abd: Soft, NT,ND,   Extremities: Warm  Neuro: A&O X3    LABS:  POCT Blood Glucose.: 233 mg/dL (03-06-23 @ 08:51)  POCT Blood Glucose.: 194 mg/dL (03-06-23 @ 02:21)  POCT Blood Glucose.: 115 mg/dL (03-05-23 @ 23:39)  POCT Blood Glucose.: 73 mg/dL (03-05-23 @ 22:56)  POCT Blood Glucose.: 56 mg/dL (03-05-23 @ 22:35)  POCT Blood Glucose.: 57 mg/dL (03-05-23 @ 22:10)  POCT Blood Glucose.: 55 mg/dL (03-05-23 @ 22:08)  POCT Blood Glucose.: 131 mg/dL (03-05-23 @ 17:51)  POCT Blood Glucose.: 329 mg/dL (03-05-23 @ 13:30)  POCT Blood Glucose.: 305 mg/dL (03-05-23 @ 08:47)  POCT Blood Glucose.: 292 mg/dL (03-04-23 @ 22:01)  POCT Blood Glucose.: 282 mg/dL (03-04-23 @ 18:30)  POCT Blood Glucose.: 254 mg/dL (03-04-23 @ 11:04)  POCT Blood Glucose.: 264 mg/dL (03-04-23 @ 07:22)  POCT Blood Glucose.: 447 mg/dL (03-04-23 @ 02:33)  POCT Blood Glucose.: 458 mg/dL (03-03-23 @ 19:31)  POCT Blood Glucose.: 495 mg/dL (03-03-23 @ 19:29)                            11.6   9.30  )-----------( 161      ( 06 Mar 2023 09:07 )             36.8       03-06    133<L>  |  103  |  76<H>  ----------------------------<  247<H>  4.4   |  17<L>  |  3.11<H>    eGFR: 20<L>    Ca    8.3<L>      03-06  Mg     2.5     03-06  Phos  4.8     03-06    TPro  6.3  /  Alb  2.9<L>  /  TBili  0.7  /  DBili  0.2  /  AST  25  /  ALT  23  /  AlkPhos  81  03-05      Thyroid Function Tests:  03-03 @ 15:09 TSH 1.06 FreeT4 -- T3 -- Anti TPO -- Anti Thyroglobulin Ab -- TSI --      A1C with Estimated Average Glucose Result: 4.7 % (02-17-23 @ 11:59)          Contact number: [email or web address] [phone] or [phone]

## 2023-03-06 NOTE — PROGRESS NOTE ADULT - PROBLEM SELECTOR PLAN 11
Diet: DASH, Carb consistent  DVT: Eliquis 5mg BID  Dispo: Pending Clinical improvement  Code Status: Full Code     Patient ask that we do not discuss this case with his family at this time. Diet: DASH, Carb consistent  DVT: Eliquis 5mg BID  Dispo: Home with Home PT   Code Status: Full Code     Patient ask that we do not discuss this case with his family at this time.

## 2023-03-06 NOTE — PROGRESS NOTE ADULT - SUBJECTIVE AND OBJECTIVE BOX
Follow Up: Pneumonia     Interval History/ROS: Seen and examined at bedside. Feels better.     REVIEW OF SYSTEMS  Unchanged from prior  + Cough and congestion    Allergies  No Known Allergies    ANTIMICROBIALS:    cefepime   IVPB 1000 every 24 hours  trimethoprim   80 mG/sulfamethoxazole 400 mG 1 every 48 hours  valGANciclovir 450 <User Schedule>  vancomycin    Solution 125 every 12 hours    OTHER MEDS: MEDICATIONS  (STANDING):  acetaminophen     Tablet .. 650 every 6 hours PRN  albuterol/ipratropium for Nebulization 3 every 6 hours  apixaban 5 every 12 hours  aspirin enteric coated 81 daily  atorvastatin 10 at bedtime  dextrose 50% Injectable 25 once  dextrose 50% Injectable 12.5 once  dextrose 50% Injectable 25 once  dextrose Oral Gel 15 once  glucagon  Injectable 1 once  guaiFENesin Oral Liquid (Sugar-Free) 200 every 6 hours  insulin glargine Injectable (LANTUS) 20 at bedtime  insulin lispro (ADMELOG) corrective regimen sliding scale  <User Schedule>  insulin lispro (ADMELOG) corrective regimen sliding scale  three times a day before meals  insulin lispro Injectable (ADMELOG) 10 before breakfast  insulin lispro Injectable (ADMELOG) 4 before dinner  insulin lispro Injectable (ADMELOG) 6 before lunch  melatonin 3 at bedtime PRN  metoprolol succinate  daily  pantoprazole    Tablet 40 before breakfast  predniSONE   Tablet 75 daily  sodium chloride 3%  Inhalation 4 every 12 hours  tacrolimus 3 <User Schedule>    Vital Signs Last 24 Hrs  T(F): 97.7 (03-06-23 @ 05:39), Max: 100.1 (03-03-23 @ 17:46)    Vital Signs Last 24 Hrs  HR: 106 (03-06-23 @ 10:55) (99 - 109)  BP: 110/82 (03-06-23 @ 10:55) (103/70 - 122/86)  RR: 18 (03-06-23 @ 05:39)  SpO2: 96% (03-06-23 @ 05:39) (96% - 96%)  Wt(kg): --    EXAM:  General: Patient in no acute distress   HEENT: NCAT, EOMI  CV: S1+S2  Lungs: No respiratory distress, CTAB, on room air   Abd: Soft, nontender, no guarding  Ext: No cyanosis  Neuro: Calm   Skin: No rash   IV: No phlebitis    Labs:                        11.6   9.30  )-----------( 161      ( 06 Mar 2023 09:07 )             36.8     03-06    133<L>  |  103  |  76<H>  ----------------------------<  247<H>  4.4   |  17<L>  |  3.11<H>    Ca    8.3<L>      06 Mar 2023 09:07  Phos  4.8     03-06  Mg     2.5     03-06    TPro  6.3  /  Alb  2.9<L>  /  TBili  0.7  /  DBili  0.2  /  AST  25  /  ALT  23  /  AlkPhos  81  03-05    WBC Trend:  WBC Count: 9.30 (03-06-23 @ 09:07)  WBC Count: 9.03 (03-05-23 @ 06:45)  WBC Count: 6.24 (03-04-23 @ 00:46)  WBC Count: 5.85 (03-03-23 @ 20:03)    Creatine Trend:  Creatinine, Serum: 3.11 (03-06)  Creatinine, Serum: 2.85 (03-05)  Creatinine, Serum: 1.85 (03-04)  Creatinine, Serum: 2.04 (03-03)    Liver Biochemical Testing Trend:  Alanine Aminotransferase (ALT/SGPT): 23 (03-05)  Alanine Aminotransferase (ALT/SGPT): 24 (03-04)  Alanine Aminotransferase (ALT/SGPT): 30 (03-03)  Alanine Aminotransferase (ALT/SGPT): 17 (01-20)  Alanine Aminotransferase (ALT/SGPT): 12 (01-06)  Aspartate Aminotransferase (AST/SGOT): 25 (03-05-23 @ 06:45)  Aspartate Aminotransferase (AST/SGOT): 32 (03-04-23 @ 00:46)  Aspartate Aminotransferase (AST/SGOT): 23 (03-03-23 @ 15:09)  Aspartate Aminotransferase (AST/SGOT): 24 (01-20-23 @ 09:57)  Aspartate Aminotransferase (AST/SGOT): 28 (01-06-23 @ 07:21)  Bilirubin Direct, Serum: 0.2 (03-05)  Bilirubin Total, Serum: 0.7 (03-05)  Bilirubin Total, Serum: 1.3 (03-04)  Bilirubin Total, Serum: 1.7 (03-03)  Bilirubin Total, Serum: 2.4 (01-20)    Trend LDH  03-05-23 @ 06:45  418<H>  03-04-23 @ 13:55  350<H>  01-12-23 @ 07:17  863<H>  01-11-23 @ 08:52  854<H>  01-10-23 @ 08:21  717<H>    MICROBIOLOGY:    MRSA PCR Result.: NotDetec (03-04-23 @ 06:32)    Culture - Blood (collected 03 Mar 2023 19:40)  Source: .Blood Blood-Peripheral  Preliminary Report:    No growth to date.    Culture - Blood (collected 03 Mar 2023 19:30)  Source: .Blood Blood-Peripheral  Preliminary Report:    No growth to date.    ABS CD4: 120 /uL (08-17-18 @ 13:00)    HIV-1 RNA Quantitative, Viral Load:   NOT DET. (05-31-18 @ 19:10)  HIV-1 Viral Load Result: NOT DET. (05-31-18 @ 19:10)    Cryptococcal Antigen - Serum: Negative (03-03-23 @ 22:39)    Rapid RVP Result: Detected (03-04 @ 06:33)  Cryptococcal Antigen - Serum: Negative (03-03 @ 22:39)  Legionella Antigen, Urine: Negative (03-03 @ 22:35)    Procalcitonin, Serum: 2.39 (03-04)    D-Dimer Assay, Quantitative: 1269 (03-05)    Lactate Dehydrogenase, Serum: 418 (03-05)  Lactate Dehydrogenase, Serum: 350 (03-04)    Serum Pro-Brain Natriuretic Peptide: 3349 (03-03)    Troponin T, High Sensitivity Result: 63 (03-03)  Troponin T, High Sensitivity Result: 60 (03-03)  Troponin T, High Sensitivity Result: 80 (03-03)    Blood Gas Venous - Lactate: 1.4 (03-04 @ 13:46)  Blood Gas Venous - Lactate: 2.0 (03-03 @ 17:38)    RADIOLOGY:  imaging below personally reviewed    < from: CT Abdomen and Pelvis No Cont (03.03.23 @ 21:21) >  IMPRESSION:  Impacted right middle and lower lobe bronchi with near complete collapse   of the right middle and lower lobes. Limited evaluation for underlying   infection.  Patchy areas of consolidation within the right upper lobe and left lower   lobe likely represents additional areasof atelectasis versus infection.    No acute intra-abdominal abnormality. Nonspecific left perinephric   stranding, slightly increased from prior.    --- End of Report ---    < end of copied text >

## 2023-03-06 NOTE — PROGRESS NOTE ADULT - ASSESSMENT
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 He is now back in sinus tachycardia. Course c/b by URI w/ human metapneumovirus. Would assess for superimposed infection given immunosuppression. Afib/flutter would be concerning for rejection, but repeat echo here with normal LV function. Also noted to have acute on chronic kidney injury likely 2/2 hypovolemia.

## 2023-03-06 NOTE — PHYSICAL THERAPY INITIAL EVALUATION ADULT - PERTINENT HX OF CURRENT PROBLEM, REHAB EVAL
71yM PMH of HTN, HLD, NICM, chronic systolic heart failure s/p HM2 LVAD (6/2017) s/p heart transplant from Hep. C donor (treated) 2/23/18 (post op course complicated by graft dysfunction treated by plasmapheresis, IVIG, and rituximab), presenting w/ chest tightness x1d. At baseline is able to walk 2 blocks before becoming dyspneic. Admitted 2/17 for routine LHC and endomyocardial biopsy; coronaries patent. Hosp Course: 3/3 P/w chest tightness x1d associated w/ dry cough; likely multifactorial in s/o atrial flutter w/ HR 140s, given metropolol with improvement chest tightness as HR is lowered. CXR +RLL atelectasis; CTH negative; CT chest +impacted R middle and lower lobe bronchi w/ near collapse of R middle/lower lobes; +positive for human metapneumovirus;

## 2023-03-06 NOTE — PROGRESS NOTE ADULT - PROBLEM SELECTOR PLAN 1
P/w chest tightness x1d associated w/ dry cough; likely multifactorial in s/o atrial flutter w/ HR 140s, notes that his chest tightness is improving as HR is lowered. Also considering contribution from CT chest findings of impacted R middle and lower lobe bronchi w/ near collapse of R middle/lower lobes.   - s/p Vanc/Zosyn in ED, 1L IVF  - f/u Bcx  - Procal elevated to 2.39; neutrophilic predominance on diff with 1% bands in immunosuppresed   - continue abx: cefepime 1g q24h (3/4 - ) --> adjusted for NORMAN  -EBV negative for active infection, MRSA negative  - f/u urine legionella, CMV, cryptococcal antigen, fungitell  - treatment of aflutter as below  - RVP positive for human metapneumovirus   -Pulm consulted for possible bronch; f/u recs  - Transplant ID following; f/u recs  - c/w ppx of bactrim and valcyclovir

## 2023-03-06 NOTE — PROGRESS NOTE ADULT - PROBLEM SELECTOR PLAN 9
None known Followed by jarred as outpatient; admitted 6.5 in Jan 2023, treated w/ 4d IV dexamethasone.   Per hematology, recommended to avoid blood transfusion unless patient is symptomatic due to risk of hyperhemolysis. Transfusion is okay from heart transplant perspective however per blood bank It will need to be emergent.   - maintain active T/S  - Hematology consulted for help with steroid management in setting of acute infection; f/u recs  - labs not concerning for hemolysis

## 2023-03-06 NOTE — PROGRESS NOTE ADULT - PROBLEM SELECTOR PLAN 2
- Home regimen: metoprolol succinate 200mg daily, losartan 100mg daily  PLAN  - Can check urine microalbumin/cr ratio as outpatient  - Outpatient goal BP <130/80. Management per primary team.

## 2023-03-06 NOTE — PROGRESS NOTE ADULT - PROBLEM SELECTOR PLAN 8
Continue home tacrolimus 5mg qd; target level 6-8  - f/u tacro level  Continue prednisone 75mg qd  Prophylaxis:   - Bactrim every other day   - Valganciclovir 450mg two times weekly   - Pantoprazole 40mg qd  - vancomycin oral   Off cellcept due to leukopenia, recurrent infections

## 2023-03-06 NOTE — PROGRESS NOTE ADULT - ASSESSMENT
71 y/o male with hx of nonischemic cardiomyopathy s/p HM2 LVAD in June 2017 complicated by recurrent GI hemorrhage and possible pump thrombosis who underwent OHT 2/23/18 with hepatitis C donor, hx of hemolytic anemia (treated with prednisone and Rituximab) presented to [facility] ER with new onset of chest tightness. Patient is s/p RHC several weeks ago and had a cath with patent coronaries. Was found to be in A flutter 140 bpm. Head Ct without acute ICH or infarction.   [id number] VAsc score 2.     1. new onset Atrial flutter   2. s/p heart transplant 2018  3. history of hemolytic anemia  4. Altered mental status in setting of Sepsis, Metapneumovirus possible PNA     - Tele with spontaneous conversion to sinus rhythm without conversion pause  - Continue metoprolol succinate 200mg daily   - Continue AC with Apixaban 5mg bid  - hemolytic anemia. H/H within normal limits, continue to monitor  - Continue telemetry monitoring  - EP will sign off, reconsult as needed    FRANSISCO Soria [facility]   #[phone] 73 y/o male with hx of nonischemic cardiomyopathy s/p HM2 LVAD in June 2017 complicated by recurrent GI hemorrhage and possible pump thrombosis who underwent OHT 2/23/18 with hepatitis C donor, hx of hemolytic anemia (treated with prednisone and Rituximab) presented to [facility] ER with new onset of chest tightness. Patient is s/p RHC several weeks ago and had a cath with patent coronaries. Was found to be in A flutter 140 bpm. Head Ct without acute ICH or infarction.   [id number] VAsc score 2.     1. new onset Atrial flutter   2. s/p heart transplant 2018  3. history of hemolytic anemia  4. Altered mental status in setting of Sepsis, Metapneumovirus possible PNA     - Tele with spontaneous conversion to sinus rhythm without conversion pause over the weekend   - Continue metoprolol succinate 200mg daily   - Continue AC with Apixaban 5mg bid  - hemolytic anemia. H/H within normal limits, continue to monitor  - Continue telemetry monitoring  - EP will sign off, reconsult as needed.  Can consider out patient EP follow up/ possible ablation once medically stable.     FRANSISCO Soria [facility]   #[phone]

## 2023-03-06 NOTE — PROGRESS NOTE ADULT - ATTENDING COMMENTS
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 Rosario, on prednisone 75 mg daily), HTN, HLD, NICM, chronic systolic heart failure s/p HM2 LVAD (6/2017) s/p heart transplant from HCV+ donor (treated) on 2/23/18 (post-op course complicated by graft dysfunction treated by plasmapheresis, IVIG, and rituximab), who presented chest tightness and noted to have human metapneumovirus. 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    #Nicole syndrome: Diagnosed after he was admitted to [facility] on 1/4/23 for hemolytic anemia with Hgb 6.5. He was treated with pulse dose dexamethasone and IVIG. He received 1 unit of pRBCs and prednisone. He waas in remission with well compensated hemolytic anemia on low dose prednisone, but relapsed after tapered to 3 mg daily. He was restarted on prednisone 75 mg daily and is responding with improvement in counts.   - Trend CBC with differential daily. Continue supportive transfusions to maintain Hgb > 7 and plt > 10.   - Trend hemolysis markers daily: LDH, reticulocyte count  - Continue prednisone 75 mg daily. PPI for GI prophylaxis. Bactrim for PCP prophylaxis.  - Will discuss with ID and primary hematologist Dr. Rosario about tapering steroid dose given active infection  - Follow up with Dr. Rosario when stable for discharge

## 2023-03-06 NOTE — PROGRESS NOTE ADULT - PROBLEM SELECTOR PLAN 1
-test BG AC/HS  -Increase Lantus 20 units QHS  -Adjust Admelog 10-6-4 w/meals  -c/w Admelog moderate correction scale AC and mod HS scale  -cons carb diet  -On prednisone 75mg PO daily. Notify endocrine team if this is changed.   Discharge plan:  - Discharge medications: pending steroid plan  - Patient to call doctor with persistent high or low BG at home.   - Ensure patient has glucometer, test strips and lancets on discharge.  - Recommend routine outpatient ophthalmology, podiatry and endocrinology f/

## 2023-03-06 NOTE — PROGRESS NOTE ADULT - SUBJECTIVE AND OBJECTIVE BOX
Hematology/Oncology Follow-up    INTERVAL HPI/OVERNIGHT EVENTS:  Patient S&E at bedside. No o/n events, patient resting comfortably. No complaints at this time. Patient denies fever, chills, dizziness, weakness, CP, palpitations, SOB, cough, N/V/D/C, dysuria, changes in bowel movements, LE edema.    VITAL SIGNS:  T(F): 97.7 (03-06-23 @ 05:39)  HR: 106 (03-06-23 @ 10:55)  BP: 110/82 (03-06-23 @ 10:55)  RR: 18 (03-06-23 @ 05:39)  SpO2: 96% (03-06-23 @ 05:39)  Wt(kg): --    PHYSICAL EXAM:    Constitutional: AAOx3, NAD,   Eyes: PERRL, EOMI, sclera non-icteric  Neck: supple, no masses, no JVD  Respiratory: CTA b/l, good air entry b/l, no wheezing, rhonchi, rales, with normal respiratory effort and no intercostal retractions  Cardiovascular: RRR, normal S1S2, no M/R/G  Gastrointestinal: soft, NTND, no masses palpable, BS normal in all four quadrants, no HSM  Extremities:  no c/c/e  Neurological: Grossly intact  Skin: No rash or lesion    MEDICATIONS  (STANDING):  albuterol/ipratropium for Nebulization 3 milliLiter(s) Nebulizer every 6 hours  apixaban 5 milliGRAM(s) Oral every 12 hours  aspirin enteric coated 81 milliGRAM(s) Oral daily  atorvastatin 10 milliGRAM(s) Oral at bedtime  cefepime   IVPB 1000 milliGRAM(s) IV Intermittent every 24 hours  dextrose 5%. 1000 milliLiter(s) (50 mL/Hr) IV Continuous <Continuous>  dextrose 5%. 1000 milliLiter(s) (100 mL/Hr) IV Continuous <Continuous>  dextrose 50% Injectable 25 Gram(s) IV Push once  dextrose 50% Injectable 12.5 Gram(s) IV Push once  dextrose 50% Injectable 25 Gram(s) IV Push once  dextrose Oral Gel 15 Gram(s) Oral once  folic acid 1 milliGRAM(s) Oral daily  glucagon  Injectable 1 milliGRAM(s) IntraMuscular once  guaiFENesin Oral Liquid (Sugar-Free) 200 milliGRAM(s) Oral every 6 hours  insulin glargine Injectable (LANTUS) 20 Unit(s) SubCutaneous at bedtime  insulin lispro (ADMELOG) corrective regimen sliding scale   SubCutaneous three times a day before meals  insulin lispro (ADMELOG) corrective regimen sliding scale   SubCutaneous <User Schedule>  insulin lispro Injectable (ADMELOG) 10 Unit(s) SubCutaneous before breakfast  insulin lispro Injectable (ADMELOG) 4 Unit(s) SubCutaneous before dinner  insulin lispro Injectable (ADMELOG) 6 Unit(s) SubCutaneous before lunch  lactated ringers. 1000 milliLiter(s) (100 mL/Hr) IV Continuous <Continuous>  metoprolol succinate  milliGRAM(s) Oral daily  pantoprazole    Tablet 40 milliGRAM(s) Oral before breakfast  predniSONE   Tablet 75 milliGRAM(s) Oral daily  sodium chloride 3%  Inhalation 4 milliLiter(s) Inhalation every 12 hours  tacrolimus 3 milliGRAM(s) Oral <User Schedule>  trimethoprim   80 mG/sulfamethoxazole 400 mG 1 Tablet(s) Oral every 48 hours  valGANciclovir 450 milliGRAM(s) Oral <User Schedule>  vancomycin    Solution 125 milliGRAM(s) Oral every 12 hours    MEDICATIONS  (PRN):  acetaminophen     Tablet .. 650 milliGRAM(s) Oral every 6 hours PRN Temp greater or equal to 38C (100.4F), Mild Pain (1 - 3)  melatonin 3 milliGRAM(s) Oral at bedtime PRN Insomnia      No Known Allergies      LABS:                        11.6   9.30  )-----------( 161      ( 06 Mar 2023 09:07 )             36.8     03-06    133<L>  |  103  |  76<H>  ----------------------------<  247<H>  4.4   |  17<L>  |  3.11<H>    Ca    8.3<L>      06 Mar 2023 09:07  Phos  4.8     03-06  Mg     2.5     03-06    TPro  6.3  /  Alb  2.9<L>  /  TBili  0.7  /  DBili  0.2  /  AST  25  /  ALT  23  /  AlkPhos  81  03-05           RADIOLOGY & ADDITIONAL TESTS:  Studies reviewed.

## 2023-03-06 NOTE — PROGRESS NOTE ADULT - PROBLEM SELECTOR PLAN 4
Patient AOx1 on admission, AOx3 by time of interview though slow to respond to questions, falling asleep intermittently during exam. Considering infection-mediated vs 2/2 hyperglycemia.  - CTH negative for acute pathology  - glycemic control as below; monitor for signs of DKA  - started on lantus 18 units and admelog 7 units TID per endocrine Patient AOx1 on admission, AOx3 by time of interview though slow to respond to questions, falling asleep intermittently during exam. Considering infection-mediated vs 2/2 hyperglycemia.  - CTH negative for acute pathology  - glycemic control as below; monitor for signs of DKA

## 2023-03-06 NOTE — PROGRESS NOTE ADULT - ASSESSMENT
71 year old M with a PMH of HTN, NICM c/b HFrEF s/p LVAD 2017, and then subsequently s/p heart transplant from HCV donor (treated) in 2018 with post-op course complicated by graft dysfunction, who initially presented to the ED with cough and chest tightness.     Cough  Afebrile, no leukocytosis  RVP + for hMPV   CT with impacted RML and RLL bronchi, and patchy consolidations in RUL and LLL  Initially on Vancomycin and Cefepime   Now on Cefepime only (MRSA PCR negative)  Legionella Ur Ag negative, serum Cryptococcal Ag negative, EBV IgM negative  Blood cultures and UA negative  Procalcitonin 2.39, Cr 2->3 (NORMAN)  On Valcyte and Bactrim prophylaxis   Fungitell, Galactomannan, Histo Ur Ag, BK PCR and CMV PCR pending     OVERALL  Suspect hMPV pneumonia in heart transplant/immunocompromised recipient   R/o superimposed bacterial infection   NORMAN, s/p heart transplant 2018  H/o HCV from donor s/p treatment   H/o C Diff in 2020     RECOMMENDATIONS   -Continue Cefepime  -Bactrim/Valcyte prophylaxis, renally dose   -F/u Fungitell, Galactomannan, Histo Ur Ag, BK PCR and CMV PCR  -Check routine/AFB/fungal sputum culture (can have Respiratory induce if unable to expectorate)   -Reasonable to send sputum PCP PCR although if pt was on prophylaxis with Bactrim lower on differential list   -Check Parvovirus PCR serum     All recommendations are tentative pending Attending Attestation.    Estuardo Diop MD, PGY-4   ID Fellow  [employer] Teams Preferred  After 5pm/weekends call [phone]

## 2023-03-06 NOTE — PROGRESS NOTE ADULT - PROBLEM SELECTOR PLAN 2
Admitted w/ HR 140s, improved after giving home dose 200mg metoprolol succinate. EP consulted and determined that patient has new-onset A-flutter. TTE done, showed EF 50-55%, concentric LV remodeling, grossly normal LV and RV systolic function. Possible compensatory response 2/2 underlying infection as above.   Patient now in sinus tachycadia  - EP recs appreciated  - continue home metoprolol succinate 200mg qd  - CHADSVASC 2; start anticoagulation w/ eliquis 5mg BID  - can trial lopressor push if HR >120s, then trial diltiazem push and transition to dilt drip Admitted w/ HR 140s, improved after giving home dose 200mg metoprolol succinate. EP consulted and determined that patient has new-onset A-flutter. TTE done, showed EF 50-55%, concentric LV remodeling, grossly normal LV and RV systolic function. Possible compensatory response 2/2 underlying infection as above.   Patient now in sinus tachycadia  - EP recs appreciated; no need for cardioversion at this time  - continue home metoprolol succinate 200mg qd  - CHADSVASC 2; start anticoagulation w/ eliquis 5mg BID  - can trial lopressor push if HR >120s, then trial diltiazem push and transition to dilt drip

## 2023-03-06 NOTE — PHYSICAL THERAPY INITIAL EVALUATION ADULT - PLANNED THERAPY INTERVENTIONS, PT EVAL
GOAL: Pt will negotiate up/down  3 steps with unilateral handrail ascending using  straight cane independnently in 2 weeks/balance training/bed mobility training/gait training/strengthening/transfer training

## 2023-03-06 NOTE — PROGRESS NOTE ADULT - ASSESSMENT
73 y/o male with hx of nonischemic cardiomyopathy s/p HM2 LVAD, underwent OHT 2/23/18 with hepatitis C donor, hx of hemolytic anemia presented w/ 1d chest tightness, found to have new onset aflutter w/ RVR, Patient found to have human metapneumovirus with likely superimposed bacterial infection given elevated procal and CT findings of RLL and RML collapse

## 2023-03-06 NOTE — PROGRESS NOTE ADULT - PROBLEM SELECTOR PLAN 2
- obtain tacro level 30min prior to administration in AM  - dose tac for goal 6-8  - transplant ID consult  - pulm consult for bronch  - restart home bactrim ppx  - con't valganciclovir ppx  - resume antifungal

## 2023-03-06 NOTE — PROGRESS NOTE ADULT - ASSESSMENT
72 year old male with PMH of HTN, HLD, NICM, chronic systolic heart failure s/p HM2 LVAD (6/2017) s/p heart transplant from Hep. C donor (treated) 2/23/18 (post op course complicated by graft dysfunction treated by plasmapheresis, IVIG, and rituximab), presenting w/ chest tightness x1d found to be in aflutter 2/2 pneumonia. Endocrinology consulted for management of diabetes. On high dose prednisone for treatment of hemolytic anemia. BG values variable on steroids, will adjust based on BG pattern. Pt w/no DM prior to initiation of steroids so likely steroid effect wearing off later in the day. BG goal (100-180mg/dl).

## 2023-03-06 NOTE — PROGRESS NOTE ADULT - ATTENDING COMMENTS
above plans discussed with Dr. [place]    # hMPV PNA  # r/o superimposed bacterial/fungal infection  # NORMAN  # Aflutter with RVR  # AIHA on high dose steroids  # steroid induced hyperglycemia  # s/p heart transplant  # hx of C.diff colitis    - Pt initially presented chest tightness found to have AFlutter at rate 140bpm but self converted to sinus rhythm and now rate controlled  - given elevated [id number] score to 2, started on systemic AC (eliquis); fu with EP as outpatient for further management  - CT chest concerning for RML/RLL consolidation, started on IV vancomycin/cefepime for empiric coverage especially given his immunocompromised status but vanco dc'ed as MRSA PCR negative  - SCr continues to trend up; no signs of urinary retention (bladder scan negative), likely pre-renal in setting of active infection vs. ATN from vancomycin; send UA with microscopic exam as well as renal US  - pt is on prednisone 75mg daily for AIHA, H/H has been stable and pt is clinically improving in terms of PNA: would favor keeping at current dose  - FS fluctuating a lot and insulin regimen has been adjusted; given worsening NORMAN, will be judicious with over-medication  - tacro level elevated; continue to monitor and adjust the dose  - pt is currently stable on RA, improvement in symptoms - hold off on pulm consult for bronchoscopy at this time  - appreciate interdisciplinary team recommendations from transplant cards, ID, ethan Trevizo MD  Division of Hospital Medicine  Contact via [employer] Teams  Office: [phone]

## 2023-03-06 NOTE — PROGRESS NOTE ADULT - ASSESSMENT
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 C donor (treated) 2/23/18 (post op course complicated by graft dysfunction treated by plasmapheresis, IVIG, and rituximab), presenting w/ chest tightness and noted to have hMVP. 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    #Grayson's syndrome  -Diagnosed after he was admitted to [facility] on 1/4/23 for hemolytic anemia with hgb 6.5. He was treated with Pulse Decadron and IVIG. He received 1 unit of packed red blood cells and then prednisone was started. He was discharged on 1/7/23. Was in remission with well compensated hemolytic anemia on low dose prednisone, but relapsed after tapered to 3 mg daily. Was started on prednisone 75 mg daily and is responding with improvement in counts.  -will touch base with ID and Dr. Rosario regarding lowering steroid dose  -Monitor FSG and treat appropriately   -Daily CBC and retic count  -Low suspicion for hemolysis  -Further care by transplant team and ID    -Follow up with Dr. Rosario when stable for discharge    Please page with questions or concerns. Will Follow with you.      Taye Mckeon M.D.  Hematology/Oncology Fellow PGY5  Pager [phone]  After 5pm, please contact on-call team.            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 C donor (treated) 2/23/18 (post op course complicated by graft dysfunction treated by plasmapheresis, IVIG, and rituximab), presenting w/ chest tightness and noted to have hMVP. 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    #Grayson's syndrome  -Diagnosed after he was admitted to [facility] on 1/4/23 for hemolytic anemia with hgb 6.5. He was treated with Pulse Decadron and IVIG. He received 1 unit of packed red blood cells and then prednisone was started. He was discharged on 1/7/23. Was in remission with well compensated hemolytic anemia on low dose prednisone, but relapsed after tapered to 3 mg daily. Was started on prednisone 75 mg daily and is responding with improvement in counts.  -Dr. Rosario in agreement with lowering steroids to 60mg  -Monitor FSG and treat appropriately   -Daily CBC and retic count  -Low suspicion for hemolysis  -Further care by transplant team and ID    -Follow up with Dr. Rosario when stable for discharge    Please page with questions or concerns. Will Follow with you.      Taye Mckeon M.D.  Hematology/Oncology Fellow PGY5  Pager [phone]  After 5pm, please contact on-call team.

## 2023-03-06 NOTE — PROGRESS NOTE ADULT - SUBJECTIVE AND OBJECTIVE BOX
Interval History:  reports cough  denies chest pain  urinating well      Medications:  acetaminophen     Tablet .. 650 milliGRAM(s) Oral every 6 hours PRN  albuterol/ipratropium for Nebulization 3 milliLiter(s) Nebulizer every 6 hours  apixaban 5 milliGRAM(s) Oral every 12 hours  aspirin enteric coated 81 milliGRAM(s) Oral daily  atorvastatin 10 milliGRAM(s) Oral at bedtime  cefepime   IVPB 1000 milliGRAM(s) IV Intermittent every 24 hours  dextrose 5%. 1000 milliLiter(s) IV Continuous <Continuous>  dextrose 5%. 1000 milliLiter(s) IV Continuous <Continuous>  dextrose 50% Injectable 25 Gram(s) IV Push once  dextrose 50% Injectable 12.5 Gram(s) IV Push once  dextrose 50% Injectable 25 Gram(s) IV Push once  dextrose Oral Gel 15 Gram(s) Oral once  folic acid 1 milliGRAM(s) Oral daily  glucagon  Injectable 1 milliGRAM(s) IntraMuscular once  guaiFENesin Oral Liquid (Sugar-Free) 200 milliGRAM(s) Oral every 6 hours  insulin glargine Injectable (LANTUS) 20 Unit(s) SubCutaneous at bedtime  insulin lispro (ADMELOG) corrective regimen sliding scale   SubCutaneous <User Schedule>  insulin lispro (ADMELOG) corrective regimen sliding scale   SubCutaneous three times a day before meals  insulin lispro Injectable (ADMELOG) 4 Unit(s) SubCutaneous before dinner  insulin lispro Injectable (ADMELOG) 6 Unit(s) SubCutaneous before lunch  lactated ringers. 1000 milliLiter(s) IV Continuous <Continuous>  melatonin 3 milliGRAM(s) Oral at bedtime PRN  metoprolol succinate  milliGRAM(s) Oral daily  nystatin    Suspension [id number] Unit(s) Oral <User Schedule>  pantoprazole    Tablet 40 milliGRAM(s) Oral before breakfast  sodium chloride 3%  Inhalation 4 milliLiter(s) Inhalation every 12 hours  trimethoprim   80 mG/sulfamethoxazole 400 mG 1 Tablet(s) Oral every 48 hours  valGANciclovir 450 milliGRAM(s) Oral <User Schedule>  vancomycin    Solution 125 milliGRAM(s) Oral every 12 hours      Vitals:  T(C): 36.7 (23 @ 21:23), Max: 36.7 (23 @ 21:23)  HR: 98 (23 @ 21:23) (98 - 106)  BP: 115/78 (23 @ 21:23) (110/82 - 122/86)  BP(mean): --  RR: 18 (23 @ 21:23) (18 - 18)  SpO2: 98% (23 @ 21:23) (95% - 98%)    Daily     Daily Weight in k.6 (06 Mar 2023 05:39)        I&O's Summary    05 Mar 2023 07:01  -  06 Mar 2023 07:00  --------------------------------------------------------  IN: 1600 mL / OUT: 1151 mL / NET: 449 mL    06 Mar 2023 07:01  -  06 Mar 2023 22:53  --------------------------------------------------------  IN: 300 mL / OUT: 351 mL / NET: -51 mL    Physical Exam:  Appearance: Cushingoid appearance  HEENT: PERRL  Neck: no JVD  Cardiovascular: Normal S1 S2, No murmurs/rubs/gallops  Respiratory: Clear to auscultation bilaterally  Gastrointestinal: Soft, Non-tender	  Skin: No cyanosis	  Neurologic: Non-focal  Extremities: No LE edema  Psychiatry: A & O x 3, Mood & affect appropriate    Labs:                        11.6   9.30  )-----------( 161      ( 06 Mar 2023 09:07 )             36.8     03-06    133<L>  |  103  |  76<H>  ----------------------------<  247<H>  4.4   |  17<L>  |  3.11<H>    Ca    8.3<L>      06 Mar 2023 09:07  Phos  4.8     03-06  Mg     2.5     03-06    TPro  6.3  /  Alb  2.9<L>  /  TBili  0.7  /  DBili  0.2  /  AST  25  /  ALT  23  /  AlkPhos  81  03-05

## 2023-03-06 NOTE — PROGRESS NOTE ADULT - PROBLEM SELECTOR PLAN 5
Prednisone-induced hyperglycemia w/ blood glucose elevated to 400-500 on admission. Based on outpatient records, patient has been hyperglycemic in the past, last A1c 4.7; not currently on any JAVIER or insulin. Not given any insulin in ED.   - BHB wnl, anion gap wnl  - given 5u regular insulin;  at the time  - increase standing insulin based on 24h requirements  - started ISS, hypoglycemia protocol  - endocrine consulted for hyperglycemia Prednisone-induced hyperglycemia w/ blood glucose elevated to 400-500 on admission. Based on outpatient records, patient has been hyperglycemic in the past, last A1c 4.7; not currently on any JAVIER or insulin. Not given any insulin in ED.   - BHB wnl, anion gap wnl  - given 5u regular insulin;  at the time  - increase standing insulin based on 24h requirements  - started ISS, hypoglycemia protocol  - endocrine consulted for hyperglycemia  - started on lantus 18 units and admelog 7 units TID per endocrine however had episodes of hypoglycemia --> transitioned to admelog 10 - 6 - 4

## 2023-03-06 NOTE — PROGRESS NOTE ADULT - SUBJECTIVE AND OBJECTIVE BOX
INTERVAL HPI/OVERNIGHT EVENTS:    SUBJECTIVE: Patient seen and examined at bedside.    OBJECTIVE:    VITAL SIGNS:  ICU Vital Signs Last 24 Hrs  T(C): 36.5 (06 Mar 2023 05:39), Max: 36.5 (06 Mar 2023 05:39)  T(F): 97.7 (06 Mar 2023 05:39), Max: 97.7 (06 Mar 2023 05:39)  HR: 99 (06 Mar 2023 05:39) (99 - 109)  BP: 122/86 (06 Mar 2023 05:39) (102/65 - 122/86)  BP(mean): --  ABP: --  ABP(mean): --  RR: 18 (06 Mar 2023 05:39) (18 - 18)  SpO2: 96% (06 Mar 2023 05:39) (96% - 97%)    O2 Parameters below as of 06 Mar 2023 05:39  Patient On (Oxygen Delivery Method): room air              03-05 @ 07:01  -  03-06 @ 07:00  --------------------------------------------------------  IN: 1600 mL / OUT: 1151 mL / NET: 449 mL      CAPILLARY BLOOD GLUCOSE      POCT Blood Glucose.: 194 mg/dL (06 Mar 2023 02:21)      PHYSICAL EXAM:    General: NAD  HEENT: NC/AT; PERRL, clear conjunctiva  Neck: supple  Respiratory: CTA b/l  Cardiovascular: +S1/S2; RRR  Abdomen: soft, NT/ND; +BS x4  Extremities:  no LE edema  Vascular: WWP, 2+ peripheral pulses b/l;  Skin: normal color and turgor; no rash  Neurological: A&Ox3, move all extremities. CN II-XII intact    MEDICATIONS:  MEDICATIONS  (STANDING):  albuterol/ipratropium for Nebulization 3 milliLiter(s) Nebulizer every 6 hours  apixaban 5 milliGRAM(s) Oral every 12 hours  aspirin enteric coated 81 milliGRAM(s) Oral daily  atorvastatin 10 milliGRAM(s) Oral at bedtime  cefepime   IVPB 1000 milliGRAM(s) IV Intermittent every 24 hours  dextrose 5%. 1000 milliLiter(s) (50 mL/Hr) IV Continuous <Continuous>  dextrose 5%. 1000 milliLiter(s) (100 mL/Hr) IV Continuous <Continuous>  dextrose 50% Injectable 25 Gram(s) IV Push once  dextrose 50% Injectable 12.5 Gram(s) IV Push once  dextrose 50% Injectable 25 Gram(s) IV Push once  dextrose Oral Gel 15 Gram(s) Oral once  folic acid 1 milliGRAM(s) Oral daily  glucagon  Injectable 1 milliGRAM(s) IntraMuscular once  guaiFENesin Oral Liquid (Sugar-Free) 200 milliGRAM(s) Oral every 6 hours  insulin glargine Injectable (LANTUS) 18 Unit(s) SubCutaneous at bedtime  insulin lispro (ADMELOG) corrective regimen sliding scale   SubCutaneous <User Schedule>  insulin lispro (ADMELOG) corrective regimen sliding scale   SubCutaneous three times a day before meals  insulin lispro Injectable (ADMELOG) 10 Unit(s) SubCutaneous before breakfast  insulin lispro Injectable (ADMELOG) 10 Unit(s) SubCutaneous before lunch  insulin lispro Injectable (ADMELOG) 7 Unit(s) SubCutaneous before dinner  metoprolol succinate  milliGRAM(s) Oral daily  pantoprazole    Tablet 40 milliGRAM(s) Oral before breakfast  predniSONE   Tablet 75 milliGRAM(s) Oral daily  sodium chloride 3%  Inhalation 4 milliLiter(s) Inhalation every 12 hours  tacrolimus 3 milliGRAM(s) Oral <User Schedule>  trimethoprim   80 mG/sulfamethoxazole 400 mG 1 Tablet(s) Oral every 48 hours  valGANciclovir 450 milliGRAM(s) Oral <User Schedule>  vancomycin    Solution 125 milliGRAM(s) Oral every 12 hours    MEDICATIONS  (PRN):  acetaminophen     Tablet .. 650 milliGRAM(s) Oral every 6 hours PRN Temp greater or equal to 38C (100.4F), Mild Pain (1 - 3)  melatonin 3 milliGRAM(s) Oral at bedtime PRN Insomnia      ALLERGIES:  Allergies    No Known Allergies    Intolerances        LABS:                        12.4   9.03  )-----------( 144      ( 05 Mar 2023 06:45 )             39.4     03-05    136  |  102  |  61<H>  ----------------------------<  290<H>  4.6   |  18<L>  |  2.85<H>    Ca    8.8      05 Mar 2023 06:45  Phos  5.1     03-05  Mg     2.6     03-05    TPro  6.3  /  Alb  2.9<L>  /  TBili  0.7  /  DBili  0.2  /  AST  25  /  ALT  23  /  AlkPhos  81  03-05          RADIOLOGY & ADDITIONAL TESTS: Reviewed. INTERVAL HPI/OVERNIGHT EVENTS: KEVIN OVN     SUBJECTIVE: Patient seen and examined at bedside. This AM patient is feeling well with no complaints of chest palpitations or tightness. Denies shortness of breath.     OBJECTIVE:    VITAL SIGNS:  ICU Vital Signs Last 24 Hrs  T(C): 36.5 (06 Mar 2023 05:39), Max: 36.5 (06 Mar 2023 05:39)  T(F): 97.7 (06 Mar 2023 05:39), Max: 97.7 (06 Mar 2023 05:39)  HR: 99 (06 Mar 2023 05:39) (99 - 109)  BP: 122/86 (06 Mar 2023 05:39) (102/65 - 122/86)  BP(mean): --  ABP: --  ABP(mean): --  RR: 18 (06 Mar 2023 05:39) (18 - 18)  SpO2: 96% (06 Mar 2023 05:39) (96% - 97%)    O2 Parameters below as of 06 Mar 2023 05:39  Patient On (Oxygen Delivery Method): room air              03-05 @ 07:01  -  03-06 @ 07:00  --------------------------------------------------------  IN: 1600 mL / OUT: 1151 mL / NET: 449 mL      CAPILLARY BLOOD GLUCOSE      POCT Blood Glucose.: 194 mg/dL (06 Mar 2023 02:21)      PHYSICAL EXAM:    General: NAD  HEENT: NC/AT; PERRL, clear conjunctiva  Neck: supple  Respiratory: Diminished breath sounds at right base however improved from prior exams   Cardiovascular: +S1/S2; RRR  Abdomen: soft, NT/ND; +BS x4  Extremities:  no LE edema  Vascular: WWP, 2+ peripheral pulses b/l;  Skin: normal color and turgor; no rash  Neurological: A&Ox3, move all extremities. CN II-XII intact    MEDICATIONS:  MEDICATIONS  (STANDING):  albuterol/ipratropium for Nebulization 3 milliLiter(s) Nebulizer every 6 hours  apixaban 5 milliGRAM(s) Oral every 12 hours  aspirin enteric coated 81 milliGRAM(s) Oral daily  atorvastatin 10 milliGRAM(s) Oral at bedtime  cefepime   IVPB 1000 milliGRAM(s) IV Intermittent every 24 hours  dextrose 5%. 1000 milliLiter(s) (50 mL/Hr) IV Continuous <Continuous>  dextrose 5%. 1000 milliLiter(s) (100 mL/Hr) IV Continuous <Continuous>  dextrose 50% Injectable 25 Gram(s) IV Push once  dextrose 50% Injectable 12.5 Gram(s) IV Push once  dextrose 50% Injectable 25 Gram(s) IV Push once  dextrose Oral Gel 15 Gram(s) Oral once  folic acid 1 milliGRAM(s) Oral daily  glucagon  Injectable 1 milliGRAM(s) IntraMuscular once  guaiFENesin Oral Liquid (Sugar-Free) 200 milliGRAM(s) Oral every 6 hours  insulin glargine Injectable (LANTUS) 18 Unit(s) SubCutaneous at bedtime  insulin lispro (ADMELOG) corrective regimen sliding scale   SubCutaneous <User Schedule>  insulin lispro (ADMELOG) corrective regimen sliding scale   SubCutaneous three times a day before meals  insulin lispro Injectable (ADMELOG) 10 Unit(s) SubCutaneous before breakfast  insulin lispro Injectable (ADMELOG) 10 Unit(s) SubCutaneous before lunch  insulin lispro Injectable (ADMELOG) 7 Unit(s) SubCutaneous before dinner  metoprolol succinate  milliGRAM(s) Oral daily  pantoprazole    Tablet 40 milliGRAM(s) Oral before breakfast  predniSONE   Tablet 75 milliGRAM(s) Oral daily  sodium chloride 3%  Inhalation 4 milliLiter(s) Inhalation every 12 hours  tacrolimus 3 milliGRAM(s) Oral <User Schedule>  trimethoprim   80 mG/sulfamethoxazole 400 mG 1 Tablet(s) Oral every 48 hours  valGANciclovir 450 milliGRAM(s) Oral <User Schedule>  vancomycin    Solution 125 milliGRAM(s) Oral every 12 hours    MEDICATIONS  (PRN):  acetaminophen     Tablet .. 650 milliGRAM(s) Oral every 6 hours PRN Temp greater or equal to 38C (100.4F), Mild Pain (1 - 3)  melatonin 3 milliGRAM(s) Oral at bedtime PRN Insomnia      ALLERGIES:  Allergies    No Known Allergies    Intolerances        LABS:                        12.4   9.03  )-----------( 144      ( 05 Mar 2023 06:45 )             39.4     03-05    136  |  102  |  61<H>  ----------------------------<  290<H>  4.6   |  18<L>  |  2.85<H>    Ca    8.8      05 Mar 2023 06:45  Phos  5.1     03-05  Mg     2.6     03-05    TPro  6.3  /  Alb  2.9<L>  /  TBili  0.7  /  DBili  0.2  /  AST  25  /  ALT  23  /  AlkPhos  81  03-05          RADIOLOGY & ADDITIONAL TESTS: Reviewed.

## 2023-03-06 NOTE — PROGRESS NOTE ADULT - PROBLEM SELECTOR PLAN 3
Meeting sepsis criteria w/ tachycardia to 140s, RR 24, oral temp 100.1 --> likely higher rectal temp, w/ PNA as possible source given symptoms, CT chest findings.   - treatment of PNA as above: vanc and cefepime  - f/u transplant ID recs Meeting sepsis criteria w/ tachycardia to 140s, RR 24, oral temp 100.1 --> likely higher rectal temp, w/ PNA as possible source given symptoms, CT chest findings.   - treatment of PNA as above: vanc and cefepime  - f/u transplant ID recs  - ID would like to decrease steroids for AIHA (patient currently on prednisone 75 QD) --> f/u heme onc recs on dosing

## 2023-03-06 NOTE — PROGRESS NOTE ADULT - SUBJECTIVE AND OBJECTIVE BOX
24H hour events: Patient converted to NSR over the weekend.  Denies c/o CP, palpitations, dizziness or SOB.     MEDICATIONS:  apixaban 5 milliGRAM(s) Oral every 12 hours  aspirin enteric coated 81 milliGRAM(s) Oral daily  metoprolol succinate  milliGRAM(s) Oral daily    cefepime   IVPB 1000 milliGRAM(s) IV Intermittent every 24 hours  trimethoprim   80 mG/sulfamethoxazole 400 mG 1 Tablet(s) Oral every 48 hours  valGANciclovir 450 milliGRAM(s) Oral <User Schedule>  vancomycin    Solution 125 milliGRAM(s) Oral every 12 hours    albuterol/ipratropium for Nebulization 3 milliLiter(s) Nebulizer every 6 hours  guaiFENesin Oral Liquid (Sugar-Free) 200 milliGRAM(s) Oral every 6 hours  sodium chloride 3%  Inhalation 4 milliLiter(s) Inhalation every 12 hours    acetaminophen     Tablet .. 650 milliGRAM(s) Oral every 6 hours PRN  melatonin 3 milliGRAM(s) Oral at bedtime PRN    pantoprazole    Tablet 40 milliGRAM(s) Oral before breakfast    atorvastatin 10 milliGRAM(s) Oral at bedtime  dextrose 50% Injectable 25 Gram(s) IV Push once  dextrose 50% Injectable 12.5 Gram(s) IV Push once  dextrose 50% Injectable 25 Gram(s) IV Push once  dextrose Oral Gel 15 Gram(s) Oral once  glucagon  Injectable 1 milliGRAM(s) IntraMuscular once  insulin glargine Injectable (LANTUS) 18 Unit(s) SubCutaneous at bedtime  insulin lispro (ADMELOG) corrective regimen sliding scale   SubCutaneous <User Schedule>  insulin lispro (ADMELOG) corrective regimen sliding scale   SubCutaneous three times a day before meals  insulin lispro Injectable (ADMELOG) 10 Unit(s) SubCutaneous before breakfast  insulin lispro Injectable (ADMELOG) 10 Unit(s) SubCutaneous before lunch  insulin lispro Injectable (ADMELOG) 7 Unit(s) SubCutaneous before dinner  predniSONE   Tablet 75 milliGRAM(s) Oral daily    dextrose 5%. 1000 milliLiter(s) IV Continuous <Continuous>  dextrose 5%. 1000 milliLiter(s) IV Continuous <Continuous>  folic acid 1 milliGRAM(s) Oral daily  lactated ringers. 1000 milliLiter(s) IV Continuous <Continuous>  tacrolimus 3 milliGRAM(s) Oral <User Schedule>      REVIEW OF SYSTEMS:  Complete 12point ROS negative.    PHYSICAL EXAM:  T(C): 36.5 (03-06-23 @ 05:39), Max: 36.5 (03-06-23 @ 05:39)  HR: 99 (03-06-23 @ 05:39) (99 - 109)  BP: 122/86 (03-06-23 @ 05:39) (102/65 - 122/86)  RR: 18 (03-06-23 @ 05:39) (18 - 18)  SpO2: 96% (03-06-23 @ 05:39) (96% - 97%)       05 Mar 2023 07:01  -  06 Mar 2023 07:00  --------------------------------------------------------  IN: 1600 mL / OUT: 1151 mL / NET: 449 mL      Appearance: Normal	  HEENT:   Normal oral mucosa, PERRL, EOMI	  Cardiovascular: Normal S1 S2, regular. No JVD, No murmurs, No edema  Respiratory: Lungs clear to auscultation	  Psychiatry: A & O x 3, Mood & affect appropriate  Gastrointestinal:  Soft, Non-tender, + BS	  Skin: No rashes, No ecchymoses, No cyanosis	  Neurologic: Non-focal  Extremities: Normal range of motion, No clubbing, cyanosis or edema  Vascular: Peripheral pulses palpable 2+ bilaterally      LABS:	 	    CBC Full  -  ( 06 Mar 2023 09:07 )  WBC Count : 9.30 K/uL  Hemoglobin : 11.6 g/dL  Hematocrit : 36.8 %  Platelet Count - Automated : 161 K/uL  Mean Cell Volume : 105.1 fl  Mean Cell Hemoglobin : 33.1 pg  Mean Cell Hemoglobin Concentration : 31.5 gm/dL  Auto Neutrophil # : 8.58 K/uL  Auto Lymphocyte # : 0.39 K/uL  Auto Monocyte # : 0.24 K/uL  Auto Eosinophil # : 0.00 K/uL  Auto Basophil # : 0.01 K/uL  Auto Neutrophil % : 92.2 %  Auto Lymphocyte % : 4.2 %  Auto Monocyte % : 2.6 %  Auto Eosinophil % : 0.0 %  Auto Basophil % : 0.1 %    03-06    133<L>  |  103  |  76<H>  ----------------------------<  247<H>  4.4   |  17<L>  |  3.11<H>  03-05    136  |  102  |  61<H>  ----------------------------<  290<H>  4.6   |  18<L>  |  2.85<H>    Ca    8.3<L>      06 Mar 2023 09:07  Ca    8.8      05 Mar 2023 06:45  Phos  4.8     03-06  Phos  5.1     03-05  Mg     2.5     03-06  Mg     2.6     03-05    TPro  6.3  /  Alb  2.9<L>  /  TBili  0.7  /  DBili  0.2  /  AST  25  /  ALT  23  /  AlkPhos  81  03-05    proBNP: Serum Pro-Brain Natriuretic Peptide: 3349 pg/mL (03-03 @ 15:09)        TELEMETRY: 	  NSR 90's     TTE:   patient name: BILLY RUSSELL  YOB: 1950   Age: 72 (M)   MR#: [id number]  Study Date: 3/3/2023  Location: O/PSonographer: Claudia Moffett [facility]  Study quality: Technically difficult  Referring Physician: Kota Garcia MD  Blood Pressure: 120/82 mmHg  Height: 170 cm  Weight: 73 kg  BSA: 1.8 m2  ------------------------------------------------------------------------  PROCEDURE: Transthoracic echocardiogram with 2-D, M-Mode  and complete spectral and color flow Doppler.  INDICATION: Abnormal electrocardiogram (ECG) (EKG) (R94.31)  ------------------------------------------------------------------------  Dimensions:    Normal Values:  LA:     4.7    2.0 - 4.0 cm  Ao:     4.3    2.0 - 3.8 cm  SEPTUM: 1.1    0.6 - 1.2 cm  PWT:    0.9    0.6 - 1.1 cm  LVIDd:  3.6    3.0 - 5.6 cm  LVIDs:  2.8    1.8 - 4.0 cm  Derived variables:  LVMI: 59 g/m2  RWT: 0.50  Fractional short: 22 %  EF (Visual Estimate): 50-55 %  Doppler Peak Velocity (m/sec): AoV=1.1  ------------------------------------------------------------------------  Observations:  Mitral Valve: Normal mitral valve. Mild mitral  regurgitation.  Aortic Valve/Aorta: Normal trileaflet aortic valve. Peak  transaortic valve gradient equals 5 mm Hg, mean transaortic  valve gradient equals 3 mm Hg, aortic valve velocity time  integral equals 15 cm. Peak left ventricular outflow tract  gradient equals 6 mm Hg, mean gradient is equal to 4 mm Hg,  LVOT velocity time integral equals 15 cm.  Aortic Root: 4.3 cm.  Ascending Aorta:3.1 cm.  LVOT diameter: 2.3 cm.  Left Atrium: LA volume index = 11 cc/m2.  Left Ventricle: Endocardium not well visualized; grossly  normal left ventricular systolic function. Increased  relative wall thickness with normal left ventricular mass  index, consistent with concentric left ventricular  remodeling. Indeterminate diastolic function.  Right Heart: Normal right atrium. The right ventricle is  not well visualized; grossly normal right ventricular  systolic function. Normal tricuspid valve. Mild tricuspid  regurgitation. Normal pulmonic valve.  Pericardium/Pleura: Normal pericardium with no pericardial  effusion.  Hemodynamic: Estimated right atrial pressure is 8 mm Hg.  Estimated right ventricular systolic pressure equals 31 mm  Hg, assuming right atrial pressure equals 8 mm Hg,  consistent with normal pulmonary pressures.  ------------------------------------------------------------------------  Conclusions:  1. Increased relative wall thickness with normal left  ventricular mass index, consistent with concentric left  ventricular remodeling.  2. Endocardium not well visualized; grossly normal left  ventricular systolic function.  3. The right ventricle is not well visualized; grossly  normal right ventricular systolic function.  Heart rate 132 bpm  ------------------------------------------------------------------------  Confirmed on  3/3/2023 - 17:37:10 by FRANSISCA Ronquillo

## 2023-03-06 NOTE — PROGRESS NOTE ADULT - PROBLEM SELECTOR PLAN 3
- Home regimen: pravastatin 20mg daily  PLAN  - Continue atorvastatin 10mg daily per primary team  - Can check lipid profile if not done recently      Can be reached via TEAMS/pager: [phone]   office:  [phone] (M-F 9a-5pm)               [phone] (nights/weekends)   Can access Amion coverage via sunrise/tools

## 2023-03-06 NOTE — PROGRESS NOTE ADULT - PROBLEM SELECTOR PLAN 1
- s/p OHT in 2/23/18  - continue home Toprol  mg PO QD; losartan on hold d/t renal dysfunction   - continue home ASA 81 and  pravastatin 20 mg PO QD  - continue home pantoprazole 40 mg PO QD    - underwent annual RHC/EMBx with Dr. Carpenter in February 2022  - plan for repeat EMBx  - can give 50 cc/hr x 5 hours

## 2023-03-06 NOTE — PHYSICAL THERAPY INITIAL EVALUATION ADULT - ADDITIONAL COMMENTS
Pt resides in a private home with brother (Rivas lipscomb- [phone]), has 3 steps to enter, +HR, pt resides on first floor. Pt has HHA 5d/6H a day to assist with cooking, cleaning, dressing, sponge bathing as needed. Pt owns RW, SAC, and commode. Pt states he was attending outpt PT PTA. (-) driving.

## 2023-03-06 NOTE — PROGRESS NOTE ADULT - ATTENDING COMMENTS
Patient seen and examined  Case discussed with Dr Diop    I agree with his history exam and plans as noted above    No evidence of bacterial infection to date- resolving human metapneumovirus  Would reduce immunosuppression and steroid dose    Gary Lujan MD  Can be called via Teams  After 5pm/weekends [phone]

## 2023-03-06 NOTE — PROGRESS NOTE ADULT - PROBLEM SELECTOR PLAN 6
Cr elevated to 2 on admission, down to 1.8 following 1L IVF. Considering sepsis mediated ATN vs prerenal NORMAN.   Now at baseline  - monitor Cr, renally dose medications  - holding losartan; can restart as needed for bp control  - s/p 1 L bolus; f/u Post void residual Cr rising, Urine lytes consistent with pre-renal etiology   - Bladder scan negative for urinary retention   - monitor Cr, renally dose medications (patient does not meet criteria for redosing eliquis based on age and weight)  - holding losartan; can restart as needed for bp control  - s/p 1 L bolus; c/w maintenance fluids

## 2023-03-07 ENCOUNTER — APPOINTMENT (OUTPATIENT)
Dept: HEART FAILURE | Facility: CLINIC | Age: 73
End: 2023-03-07

## 2023-03-07 ENCOUNTER — TRANSCRIPTION ENCOUNTER (OUTPATIENT)
Age: 73
End: 2023-03-07

## 2023-03-07 LAB
-  BLOOD PCR PANEL: SIGNIFICANT CHANGE UP
ANION GAP SERPL CALC-SCNC: 12 MMOL/L — SIGNIFICANT CHANGE UP (ref 5–17)
APTT BLD: 27 SEC — LOW (ref 27.5–35.5)
BASOPHILS # BLD AUTO: 0.02 K/UL — SIGNIFICANT CHANGE UP (ref 0–0.2)
BASOPHILS NFR BLD AUTO: 0.2 % — SIGNIFICANT CHANGE UP (ref 0–2)
BKV DNA SPEC QL NAA+PROBE: SIGNIFICANT CHANGE UP
BLD GP AB SCN SERPL QL: POSITIVE — SIGNIFICANT CHANGE UP
BUN SERPL-MCNC: 76 MG/DL — HIGH (ref 7–23)
CALCIUM SERPL-MCNC: 7.9 MG/DL — LOW (ref 8.4–10.5)
CHLORIDE SERPL-SCNC: 105 MMOL/L — SIGNIFICANT CHANGE UP (ref 96–108)
CO2 SERPL-SCNC: 18 MMOL/L — LOW (ref 22–31)
CREAT SERPL-MCNC: 2.71 MG/DL — HIGH (ref 0.5–1.3)
CULTURE RESULTS: SIGNIFICANT CHANGE UP
DAT C3-SP REAG RBC QL: NEGATIVE — SIGNIFICANT CHANGE UP
EGFR: 24 ML/MIN/1.73M2 — LOW
EOSINOPHIL # BLD AUTO: 0 K/UL — SIGNIFICANT CHANGE UP (ref 0–0.5)
EOSINOPHIL NFR BLD AUTO: 0 % — SIGNIFICANT CHANGE UP (ref 0–6)
FUNGITELL: 300 PG/ML — HIGH
GALACTOMANNAN AG SERPL-ACNC: 0.05 INDEX — SIGNIFICANT CHANGE UP (ref 0–0.49)
GLUCOSE BLDC GLUCOMTR-MCNC: 180 MG/DL — HIGH (ref 70–99)
GLUCOSE BLDC GLUCOMTR-MCNC: 190 MG/DL — HIGH (ref 70–99)
GLUCOSE BLDC GLUCOMTR-MCNC: 207 MG/DL — HIGH (ref 70–99)
GLUCOSE BLDC GLUCOMTR-MCNC: 210 MG/DL — HIGH (ref 70–99)
GLUCOSE BLDC GLUCOMTR-MCNC: 279 MG/DL — HIGH (ref 70–99)
GLUCOSE SERPL-MCNC: 193 MG/DL — HIGH (ref 70–99)
GRAM STN FLD: SIGNIFICANT CHANGE UP
H CAPSUL AG SPEC-ACNC: SIGNIFICANT CHANGE UP
H CAPSUL AG UR IA-ACNC: SIGNIFICANT CHANGE UP NG/ML
H CAPSUL AG UR QL IA: SIGNIFICANT CHANGE UP
HCT VFR BLD CALC: 34.8 % — LOW (ref 39–50)
HGB BLD-MCNC: 11.1 G/DL — LOW (ref 13–17)
IMM GRANULOCYTES NFR BLD AUTO: 1.2 % — HIGH (ref 0–0.9)
INR BLD: 1.17 RATIO — HIGH (ref 0.88–1.16)
JCPYV DNA SPEC QL NAA+PROBE: SIGNIFICANT CHANGE UP
LYMPHOCYTES # BLD AUTO: 0.66 K/UL — LOW (ref 1–3.3)
LYMPHOCYTES # BLD AUTO: 7 % — LOW (ref 13–44)
MAGNESIUM SERPL-MCNC: 2.5 MG/DL — SIGNIFICANT CHANGE UP (ref 1.6–2.6)
MCHC RBC-ENTMCNC: 31.9 GM/DL — LOW (ref 32–36)
MCHC RBC-ENTMCNC: 32.6 PG — SIGNIFICANT CHANGE UP (ref 27–34)
MCV RBC AUTO: 102.1 FL — HIGH (ref 80–100)
METHOD TYPE: SIGNIFICANT CHANGE UP
MONOCYTES # BLD AUTO: 0.56 K/UL — SIGNIFICANT CHANGE UP (ref 0–0.9)
MONOCYTES NFR BLD AUTO: 5.9 % — SIGNIFICANT CHANGE UP (ref 2–14)
NEUTROPHILS # BLD AUTO: 8.13 K/UL — HIGH (ref 1.8–7.4)
NEUTROPHILS NFR BLD AUTO: 85.7 % — HIGH (ref 43–77)
NIGHT BLUE STAIN TISS: SIGNIFICANT CHANGE UP
NRBC # BLD: 0 /100 WBCS — SIGNIFICANT CHANGE UP (ref 0–0)
ORGANISM # SPEC MICROSCOPIC CNT: SIGNIFICANT CHANGE UP
ORGANISM # SPEC MICROSCOPIC CNT: SIGNIFICANT CHANGE UP
PHOSPHATE SERPL-MCNC: 4 MG/DL — SIGNIFICANT CHANGE UP (ref 2.5–4.5)
PLATELET # BLD AUTO: 195 K/UL — SIGNIFICANT CHANGE UP (ref 150–400)
POTASSIUM SERPL-MCNC: 4 MMOL/L — SIGNIFICANT CHANGE UP (ref 3.5–5.3)
POTASSIUM SERPL-SCNC: 4 MMOL/L — SIGNIFICANT CHANGE UP (ref 3.5–5.3)
PROTHROM AB SERPL-ACNC: 13.6 SEC — HIGH (ref 10.5–13.4)
RBC # BLD: 3.41 M/UL — LOW (ref 4.2–5.8)
RBC # FLD: 15.5 % — HIGH (ref 10.3–14.5)
RH IG SCN BLD-IMP: POSITIVE — SIGNIFICANT CHANGE UP
SODIUM SERPL-SCNC: 135 MMOL/L — SIGNIFICANT CHANGE UP (ref 135–145)
SPECIMEN SOURCE: SIGNIFICANT CHANGE UP
TACROLIMUS SERPL-MCNC: 8.2 NG/ML — SIGNIFICANT CHANGE UP
WBC # BLD: 9.48 K/UL — SIGNIFICANT CHANGE UP (ref 3.8–10.5)
WBC # FLD AUTO: 9.48 K/UL — SIGNIFICANT CHANGE UP (ref 3.8–10.5)

## 2023-03-07 PROCEDURE — 76770 US EXAM ABDO BACK WALL COMP: CPT | Mod: 26

## 2023-03-07 PROCEDURE — 99232 SBSQ HOSP IP/OBS MODERATE 35: CPT

## 2023-03-07 PROCEDURE — 99232 SBSQ HOSP IP/OBS MODERATE 35: CPT | Mod: GC

## 2023-03-07 PROCEDURE — 99233 SBSQ HOSP IP/OBS HIGH 50: CPT | Mod: GC

## 2023-03-07 RX ORDER — HEPARIN SODIUM 5000 [USP'U]/ML
INJECTION INTRAVENOUS; SUBCUTANEOUS
Qty: 25000 | Refills: 0 | Status: DISCONTINUED | OUTPATIENT
Start: 2023-03-07 | End: 2023-03-09

## 2023-03-07 RX ORDER — SIMETHICONE 80 MG/1
80 TABLET, CHEWABLE ORAL DAILY
Refills: 0 | Status: DISCONTINUED | OUTPATIENT
Start: 2023-03-07 | End: 2023-03-18

## 2023-03-07 RX ORDER — SODIUM CHLORIDE 9 MG/ML
1000 INJECTION, SOLUTION INTRAVENOUS
Refills: 0 | Status: DISCONTINUED | OUTPATIENT
Start: 2023-03-07 | End: 2023-03-09

## 2023-03-07 RX ORDER — INSULIN LISPRO 100/ML
14 VIAL (ML) SUBCUTANEOUS
Refills: 0 | Status: DISCONTINUED | OUTPATIENT
Start: 2023-03-08 | End: 2023-03-11

## 2023-03-07 RX ADMIN — Medication 81 MILLIGRAM(S): at 11:02

## 2023-03-07 RX ADMIN — Medication 2: at 22:30

## 2023-03-07 RX ADMIN — SODIUM CHLORIDE 4 MILLILITER(S): 9 INJECTION INTRAMUSCULAR; INTRAVENOUS; SUBCUTANEOUS at 17:27

## 2023-03-07 RX ADMIN — HEPARIN SODIUM 1300 UNIT(S)/HR: 5000 INJECTION INTRAVENOUS; SUBCUTANEOUS at 18:36

## 2023-03-07 RX ADMIN — Medication 125 MILLIGRAM(S): at 16:43

## 2023-03-07 RX ADMIN — INSULIN GLARGINE 20 UNIT(S): 100 INJECTION, SOLUTION SUBCUTANEOUS at 22:29

## 2023-03-07 RX ADMIN — Medication 12 UNIT(S): at 09:07

## 2023-03-07 RX ADMIN — Medication 500000 UNIT(S): at 16:41

## 2023-03-07 RX ADMIN — Medication 125 MILLIGRAM(S): at 06:13

## 2023-03-07 RX ADMIN — Medication 1 MILLIGRAM(S): at 11:02

## 2023-03-07 RX ADMIN — Medication 3 MILLILITER(S): at 16:42

## 2023-03-07 RX ADMIN — VALGANCICLOVIR 450 MILLIGRAM(S): 450 TABLET, FILM COATED ORAL at 06:15

## 2023-03-07 RX ADMIN — CEFEPIME 100 MILLIGRAM(S): 1 INJECTION, POWDER, FOR SOLUTION INTRAMUSCULAR; INTRAVENOUS at 16:41

## 2023-03-07 RX ADMIN — Medication 2: at 17:28

## 2023-03-07 RX ADMIN — ATORVASTATIN CALCIUM 10 MILLIGRAM(S): 80 TABLET, FILM COATED ORAL at 22:30

## 2023-03-07 RX ADMIN — TACROLIMUS 0.5 MILLIGRAM(S): 5 CAPSULE ORAL at 19:57

## 2023-03-07 RX ADMIN — PANTOPRAZOLE SODIUM 40 MILLIGRAM(S): 20 TABLET, DELAYED RELEASE ORAL at 06:12

## 2023-03-07 RX ADMIN — Medication 200 MILLIGRAM(S): at 16:42

## 2023-03-07 RX ADMIN — Medication 2: at 09:07

## 2023-03-07 RX ADMIN — Medication 4: at 13:35

## 2023-03-07 RX ADMIN — TACROLIMUS 0.5 MILLIGRAM(S): 5 CAPSULE ORAL at 07:44

## 2023-03-07 RX ADMIN — Medication 6 UNIT(S): at 13:35

## 2023-03-07 RX ADMIN — Medication 3 MILLILITER(S): at 06:13

## 2023-03-07 RX ADMIN — Medication 60 MILLIGRAM(S): at 06:12

## 2023-03-07 RX ADMIN — SIMETHICONE 80 MILLIGRAM(S): 80 TABLET, CHEWABLE ORAL at 17:30

## 2023-03-07 RX ADMIN — Medication 200 MILLIGRAM(S): at 06:15

## 2023-03-07 RX ADMIN — Medication 4 UNIT(S): at 17:29

## 2023-03-07 RX ADMIN — HEPARIN SODIUM 1300 UNIT(S)/HR: 5000 INJECTION INTRAVENOUS; SUBCUTANEOUS at 20:00

## 2023-03-07 RX ADMIN — Medication 500000 UNIT(S): at 11:02

## 2023-03-07 RX ADMIN — Medication 500000 UNIT(S): at 19:56

## 2023-03-07 RX ADMIN — SODIUM CHLORIDE 4 MILLILITER(S): 9 INJECTION INTRAMUSCULAR; INTRAVENOUS; SUBCUTANEOUS at 06:12

## 2023-03-07 RX ADMIN — Medication 3 MILLILITER(S): at 11:05

## 2023-03-07 RX ADMIN — Medication 500000 UNIT(S): at 07:45

## 2023-03-07 RX ADMIN — APIXABAN 5 MILLIGRAM(S): 2.5 TABLET, FILM COATED ORAL at 06:12

## 2023-03-07 RX ADMIN — Medication 200 MILLIGRAM(S): at 11:06

## 2023-03-07 RX ADMIN — Medication 200 MILLIGRAM(S): at 06:12

## 2023-03-07 RX ADMIN — SODIUM CHLORIDE 50 MILLILITER(S): 9 INJECTION, SOLUTION INTRAVENOUS at 19:59

## 2023-03-07 NOTE — PROGRESS NOTE ADULT - SUBJECTIVE AND OBJECTIVE BOX
DIABETES FOLLOW UP NOTE: Saw pt earlier today    Chief Complaint: Endocrine consult requested for management of T2DM    INTERVAL HX: Pt stable, reports tolerating POs with BG improved except for hyperglycemia ac lunch after getting am Prednisone dose which is now down to 60mg/day. No further hypoglycemia. Pt reports feeling better but weak. No SOB/CP reported at time of visit.       Review of Systems:  General: As above  Cardiovascular: No chest pain, palpitations  Respiratory: No SOB, no cough  GI: No nausea, vomiting, abdominal pain  Endocrine: No polyuria, polydipsia or S&Sx of hypoglycemia    Allergies    No Known Allergies    Intolerances      MEDICATIONS:  atorvastatin 10 milliGRAM(s) Oral at bedtime  cefepime   IVPB 1000 milliGRAM(s) IV Intermittent every 24 hours  insulin glargine Injectable (LANTUS) 20 Unit(s) SubCutaneous at bedtime  insulin lispro (ADMELOG) corrective regimen sliding scale   SubCutaneous three times a day before meals  insulin lispro (ADMELOG) corrective regimen sliding scale   SubCutaneous <User Schedule>  insulin lispro Injectable (ADMELOG) 4 Unit(s) SubCutaneous before dinner  insulin lispro Injectable (ADMELOG) 6 Unit(s) SubCutaneous before lunch  insulin lispro Injectable (ADMELOG) 12 Unit(s) SubCutaneous before breakfast  predniSONE   Tablet 60 milliGRAM(s) Oral daily  tacrolimus 0.5 milliGRAM(s) Oral <User Schedule>  trimethoprim   80 mG/sulfamethoxazole 400 mG 1 Tablet(s) Oral every 48 hours  valGANciclovir 450 milliGRAM(s) Oral <User Schedule>  vancomycin    Solution 125 milliGRAM(s) Oral every 12 hours      PHYSICAL EXAM:  VITALS: T(C): 36.6 (03-07-23 @ 12:03)  T(F): 97.8 (03-07-23 @ 12:03), Max: 98 (03-06-23 @ 21:23)  HR: 109 (03-07-23 @ 12:03) (98 - 109)  BP: 114/70 (03-07-23 @ 12:03) (107/74 - 115/78)  RR:  (18 - 18)  SpO2:  (96% - 98%)  Wt(kg): --  GENERAL: Male laying in bed in NAD  Abdomen: Soft, nontender, non distended  Extremities: Warm, no edema in all 4 exts  NEURO: Alert and able to answer all questions    LABS:  POCT Blood Glucose.: 210 mg/dL (03-07-23 @ 12:53)  POCT Blood Glucose.: 180 mg/dL (03-07-23 @ 09:01)  POCT Blood Glucose.: 207 mg/dL (03-07-23 @ 02:14)  POCT Blood Glucose.: 152 mg/dL (03-06-23 @ 23:35)  POCT Blood Glucose.: 129 mg/dL (03-06-23 @ 21:26)  POCT Blood Glucose.: 133 mg/dL (03-06-23 @ 17:08)  POCT Blood Glucose.: 291 mg/dL (03-06-23 @ 12:38)  POCT Blood Glucose.: 233 mg/dL (03-06-23 @ 08:51)  POCT Blood Glucose.: 194 mg/dL (03-06-23 @ 02:21)  POCT Blood Glucose.: 115 mg/dL (03-05-23 @ 23:39)  POCT Blood Glucose.: 73 mg/dL (03-05-23 @ 22:56)  POCT Blood Glucose.: 56 mg/dL (03-05-23 @ 22:35)  POCT Blood Glucose.: 57 mg/dL (03-05-23 @ 22:10)  POCT Blood Glucose.: 55 mg/dL (03-05-23 @ 22:08)  POCT Blood Glucose.: 131 mg/dL (03-05-23 @ 17:51)  POCT Blood Glucose.: 329 mg/dL (03-05-23 @ 13:30)  POCT Blood Glucose.: 305 mg/dL (03-05-23 @ 08:47)  POCT Blood Glucose.: 292 mg/dL (03-04-23 @ 22:01)  POCT Blood Glucose.: 282 mg/dL (03-04-23 @ 18:30)                            11.1   9.48  )-----------( 195      ( 07 Mar 2023 06:52 )             34.8       03-07    135  |  105  |  76<H>  ----------------------------<  193<H>  4.0   |  18<L>  |  2.71<H>    eGFR: 24<L>    Ca    7.9<L>      03-07  Mg     2.5     03-07  Phos  4.0     03-07    TPro  6.3  /  Alb  2.9<L>  /  TBili  0.7  /  DBili  0.2  /  AST  25  /  ALT  23  /  AlkPhos  81  03-05    Thyroid Function Tests:  03-03 @ 15:09 TSH 1.06 FreeT4 -- T3 -- Anti TPO -- Anti Thyroglobulin Ab -- TSI --      A1C with Estimated Average Glucose Result: 4.7 % (02-17-23 @ 11:59)      Estimated Average Glucose: 88 mg/dL (02-17-23 @ 11:59)    02-17 Chol 143 Direct LDL -- LDL calculated 51 HDL 69 Trig 116

## 2023-03-07 NOTE — DISCHARGE NOTE PROVIDER - CARE PROVIDER_API CALL
Tao Rosario)  Hematology; Internal Medicine; Medical Oncology  [address] [place] [address]  Phone: [phone]  Fax: [phone]  Established Patient  Follow Up Time: 2 weeks    Marvin Campbell  Cardiovascular Diseases  [address] [place] [address]  Phone: [phone]  Fax: [phone]  Established Patient  Follow Up Time: 2 weeks

## 2023-03-07 NOTE — DISCHARGE NOTE PROVIDER - NSDCFUSCHEDAPPT_GEN_ALL_CORE_FT
[facility]  PODIATRY OP [address] [facility]  Scheduled Appointment: 03/08/2023    Tao Rosario  [facility]  [facility]  Scheduled Appointment: 03/10/2023    Tao Rosario  [facility]  [facility]  Scheduled Appointment: 03/10/2023    Yessica Dalton  [address]  Scheduled Appointment: 03/28/2023     Marvin Campbell  [facility] [address]  Scheduled Appointment: 03/15/2023    Yessica Dalton  [facility] Physician [address]  Scheduled Appointment: 03/28/2023     Yessica Dalton  [facility]  [address]  Scheduled Appointment: 03/28/2023

## 2023-03-07 NOTE — PROGRESS NOTE ADULT - PROBLEM SELECTOR PLAN 2
Admitted w/ HR 140s, improved after giving home dose 200mg metoprolol succinate. EP consulted and determined that patient has new-onset A-flutter. TTE done, showed EF 50-55%, concentric LV remodeling, grossly normal LV and RV systolic function. Possible compensatory response 2/2 underlying infection as above.   Patient now in sinus tachycadia  - EP recs appreciated; no need for cardioversion at this time  - continue home metoprolol succinate 200mg qd  - CHADSVASC 2; start anticoagulation w/ eliquis 5mg BID  - can trial lopressor push if HR >120s, then trial diltiazem push and transition to dilt drip

## 2023-03-07 NOTE — PROGRESS NOTE ADULT - PROBLEM SELECTOR PLAN 6
Cr rising, Urine lytes consistent with pre-renal etiology   - Bladder scan negative for urinary retention   - monitor Cr, renally dose medications (patient does not meet criteria for redosing eliquis based on age and weight)  - holding losartan; can restart as needed for bp control  - s/p 1 L bolus; c/w maintenance fluids

## 2023-03-07 NOTE — PROGRESS NOTE ADULT - ASSESSMENT
70YO Male PMH HTN, HLD, NICM, chronic systolic heart failure s/p HM2 LVAD (6/2017) s/p heart transplant from Hep. C donor (treated) 2/23/18 (post op course complicated by graft dysfunction treated by plasmapheresis, IVIG, and rituximab), presenting w/ chest tightness x1d found to be HMPV positive with impacted bronchus intermedius however without hypoxemia, Pulm consulted for possible bronchoscopy. 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 Bonilla   70YO Male PMH HTN, HLD, NICM, chronic systolic heart failure s/p HM2 LVAD (6/2017) s/p heart transplant from Hep. C donor (treated) 2/23/18 (post op course complicated by graft dysfunction treated by plasmapheresis, IVIG, and rituximab), presenting w/ chest tightness x1d found to be HMPV positive with impacted bronchus intermedius however without hypoxemia, Pulm consulted for possible bronchoscopy. 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 Bonilla

## 2023-03-07 NOTE — PROGRESS NOTE ADULT - SUBJECTIVE AND OBJECTIVE BOX
Follow Up: PNA     Interval History/ROS: Seen and examined at bedside. Remains on room air.     REVIEW OF SYSTEMS  Unchanged from prior     Allergies  No Known Allergies    ANTIMICROBIALS:    cefepime   IVPB 1000 every 24 hours  nystatin    Suspension 247438 <User Schedule>  trimethoprim   80 mG/sulfamethoxazole 400 mG 1 every 48 hours  valGANciclovir 450 <User Schedule>  vancomycin    Solution 125 every 12 hours    OTHER MEDS: MEDICATIONS  (STANDING):  acetaminophen     Tablet .. 650 every 6 hours PRN  albuterol/ipratropium for Nebulization 3 every 6 hours  apixaban 5 every 12 hours  aspirin enteric coated 81 daily  atorvastatin 10 at bedtime  dextrose 50% Injectable 25 once  dextrose 50% Injectable 12.5 once  dextrose 50% Injectable 25 once  dextrose Oral Gel 15 once  glucagon  Injectable 1 once  guaiFENesin Oral Liquid (Sugar-Free) 200 every 6 hours  insulin glargine Injectable (LANTUS) 20 at bedtime  insulin lispro (ADMELOG) corrective regimen sliding scale  three times a day before meals  insulin lispro (ADMELOG) corrective regimen sliding scale  <User Schedule>  insulin lispro Injectable (ADMELOG) 4 before dinner  insulin lispro Injectable (ADMELOG) 6 before lunch  insulin lispro Injectable (ADMELOG) 12 before breakfast  melatonin 3 at bedtime PRN  metoprolol succinate  daily  pantoprazole    Tablet 40 before breakfast  predniSONE   Tablet 60 daily  sodium chloride 3%  Inhalation 4 every 12 hours  tacrolimus 0.5 <User Schedule>    Vital Signs Last 24 Hrs  T(F): 97.8 (23 @ 12:03), Max: 100.1 (23 @ 17:46)    Vital Signs Last 24 Hrs  HR: 109 (23 @ 12:03) (98 - 109)  BP: 114/70 (23 @ 12:03) (107/74 - 115/78)  RR: 18 (23 @ 12:03)  SpO2: 97% (23 @ 12:03) (96% - 98%)  Wt(kg): --    EXAM:  General: Patient in NAD   HEENT: NCAT, EOMI  CV: S1+S2  Lungs: No respiratory distress, CTAB, on room air   Abd: Soft, nontender, no guarding  Ext: No cyanosis  Neuro: Calm   Skin: No rash   IV: No phlebitis    Labs:                        11.1   9.48  )-----------( 195      ( 07 Mar 2023 06:52 )             34.8         135  |  105  |  76<H>  ----------------------------<  193<H>  4.0   |  18<L>  |  2.71<H>    Ca    7.9<L>      07 Mar 2023 06:52  Phos  4.0       Mg     2.5         WBC Trend:  WBC Count: 9.48 (23 @ 06:52)  WBC Count: 9.30 (23 @ 09:07)  WBC Count: 9.03 (23 @ 06:45)  WBC Count: 6.24 (23 @ 00:46)    Creatine Trend:  Creatinine, Serum: 2.71 ()  Creatinine, Serum: 3.11 ()  Creatinine, Serum: 2.85 ()  Creatinine, Serum: 1.85 ()    Liver Biochemical Testing Trend:  Alanine Aminotransferase (ALT/SGPT): 23 ()  Alanine Aminotransferase (ALT/SGPT): 24 ()  Alanine Aminotransferase (ALT/SGPT): 30 ()  Alanine Aminotransferase (ALT/SGPT): 17 ()  Alanine Aminotransferase (ALT/SGPT): 12 ()  Aspartate Aminotransferase (AST/SGOT): 25 (23 @ 06:45)  Aspartate Aminotransferase (AST/SGOT): 32 (23 @ 00:46)  Aspartate Aminotransferase (AST/SGOT): 23 (23 @ 15:09)  Aspartate Aminotransferase (AST/SGOT): 24 (23 @ 09:57)  Aspartate Aminotransferase (AST/SGOT): 28 (23 @ 07:21)  Bilirubin Direct, Serum: 0.2 ()  Bilirubin Total, Serum: 0.7 ()  Bilirubin Total, Serum: 1.3 ()  Bilirubin Total, Serum: 1.7 ()  Bilirubin Total, Serum: 2.4 ()    Trend LDH  23 @ 06:45  418<H>  23 @ 13:55  350<H>  23 @ 07:17  863<H>  23 @ 08:52  854<H>  01-10-23 @ 08:21  717<H>    Urinalysis Basic - ( 06 Mar 2023 17:49 )    Color: Yellow / Appearance: Slightly Turbid / S.022 / pH: x  Gluc: x / Ketone: Trace  / Bili: Negative / Urobili: Negative   Blood: x / Protein: 30 mg/dL / Nitrite: Negative   Leuk Esterase: Negative / RBC: 1 /hpf / WBC 1 /HPF   Sq Epi: x / Non Sq Epi: 2 /hpf / Bacteria: Negative    MICROBIOLOGY:    MRSA PCR Result.: NotDetec (23 @ 06:32)    Culture - Blood (collected 03 Mar 2023 19:40)  Source: .Blood Blood-Peripheral  Preliminary Report:    Growth in aerobic bottle: Gram positive cocci in pairs    ***Blood Panel PCR results on this specimen are available    approximately 3 hours after the Gram stain result.***    Gram stain, PCR, and/or culture results may not always    correspond due to difference in methodologies.    ************************************************************    This PCR assay was performed by multiplex PCR. This    Assay tests for 66 bacterial and resistance gene targets.    Please refer to the [facility] test directory    at [email or web address] for details.  Organism: Blood Culture PCR  Organism: Blood Culture PCR    Sensitivities:      -  Blood PCR Panel: NEG      Method Type: PCR    Culture - Blood (collected 03 Mar 2023 19:30)  Source: .Blood Blood-Peripheral  Preliminary Report:    No growth to date.    ABS CD4: 120 /uL (18 @ 13:00)    HIV-1 RNA Quantitative, Viral Load:   NOT DET. (18 @ 19:10)  HIV-1 Viral Load Result: NOT DET. (18 @ 19:10)    Cryptococcal Antigen - Serum: Negative (23 @ 22:39)  Cytomegalovirus By PCR:   NotDetec (23 @ 20:03)    Rapid RVP Result: Detected ( @ 06:33)  Cryptococcal Antigen - Serum: Negative ( @ 22:39)  Legionella Antigen, Urine: Negative ( @ 22:35)    Procalcitonin, Serum: 2.39 ()    D-Dimer Assay, Quantitative: 1269 ()    Lactate Dehydrogenase, Serum: 418 ()  Lactate Dehydrogenase, Serum: 350 ()    Serum Pro-Brain Natriuretic Peptide: 3349 ()    Troponin T, High Sensitivity Result: 63 ()  Troponin T, High Sensitivity Result: 60 ()  Troponin T, High Sensitivity Result: 80 ()    Blood Gas Venous - Lactate: 1.4 ( @ 13:46)    RADIOLOGY:  imaging below personally reviewed

## 2023-03-07 NOTE — PROGRESS NOTE ADULT - SUBJECTIVE AND OBJECTIVE BOX
INTERVAL HPI/OVERNIGHT EVENTS:    SUBJECTIVE: Patient seen and examined at bedside.    OBJECTIVE:    VITAL SIGNS:  ICU Vital Signs Last 24 Hrs  T(C): 36.6 (07 Mar 2023 06:24), Max: 36.7 (06 Mar 2023 21:23)  T(F): 97.9 (07 Mar 2023 06:24), Max: 98 (06 Mar 2023 21:23)  HR: 101 (07 Mar 2023 06:24) (98 - 106)  BP: 107/74 (07 Mar 2023 06:24) (107/74 - 115/78)  BP(mean): --  ABP: --  ABP(mean): --  RR: 18 (07 Mar 2023 06:24) (18 - 18)  SpO2: 96% (07 Mar 2023 06:24) (95% - 98%)    O2 Parameters below as of 07 Mar 2023 06:24  Patient On (Oxygen Delivery Method): room air              03-06 @ 07:01  -  03-07 @ 07:00  --------------------------------------------------------  IN: 500 mL / OUT: 751 mL / NET: -251 mL      CAPILLARY BLOOD GLUCOSE      POCT Blood Glucose.: 207 mg/dL (07 Mar 2023 02:14)      PHYSICAL EXAM:    General: NAD  HEENT: NC/AT; PERRL, clear conjunctiva  Neck: supple  Respiratory: CTA b/l  Cardiovascular: +S1/S2; RRR  Abdomen: soft, NT/ND; +BS x4  Extremities:  no LE edema  Vascular: WWP, 2+ peripheral pulses b/l;  Skin: normal color and turgor; no rash  Neurological: A&Ox3, move all extremities. CN II-XII intact    MEDICATIONS:  MEDICATIONS  (STANDING):  albuterol/ipratropium for Nebulization 3 milliLiter(s) Nebulizer every 6 hours  apixaban 5 milliGRAM(s) Oral every 12 hours  aspirin enteric coated 81 milliGRAM(s) Oral daily  atorvastatin 10 milliGRAM(s) Oral at bedtime  cefepime   IVPB 1000 milliGRAM(s) IV Intermittent every 24 hours  dextrose 5%. 1000 milliLiter(s) (50 mL/Hr) IV Continuous <Continuous>  dextrose 5%. 1000 milliLiter(s) (100 mL/Hr) IV Continuous <Continuous>  dextrose 50% Injectable 25 Gram(s) IV Push once  dextrose 50% Injectable 12.5 Gram(s) IV Push once  dextrose 50% Injectable 25 Gram(s) IV Push once  dextrose Oral Gel 15 Gram(s) Oral once  folic acid 1 milliGRAM(s) Oral daily  glucagon  Injectable 1 milliGRAM(s) IntraMuscular once  guaiFENesin Oral Liquid (Sugar-Free) 200 milliGRAM(s) Oral every 6 hours  insulin glargine Injectable (LANTUS) 20 Unit(s) SubCutaneous at bedtime  insulin lispro (ADMELOG) corrective regimen sliding scale   SubCutaneous <User Schedule>  insulin lispro (ADMELOG) corrective regimen sliding scale   SubCutaneous three times a day before meals  insulin lispro Injectable (ADMELOG) 4 Unit(s) SubCutaneous before dinner  insulin lispro Injectable (ADMELOG) 6 Unit(s) SubCutaneous before lunch  insulin lispro Injectable (ADMELOG) 12 Unit(s) SubCutaneous before breakfast  lactated ringers. 1000 milliLiter(s) (100 mL/Hr) IV Continuous <Continuous>  metoprolol succinate  milliGRAM(s) Oral daily  nystatin    Suspension [id number] Unit(s) Oral <User Schedule>  pantoprazole    Tablet 40 milliGRAM(s) Oral before breakfast  predniSONE   Tablet 60 milliGRAM(s) Oral daily  sodium chloride 3%  Inhalation 4 milliLiter(s) Inhalation every 12 hours  tacrolimus 0.5 milliGRAM(s) Oral <User Schedule>  trimethoprim   80 mG/sulfamethoxazole 400 mG 1 Tablet(s) Oral every 48 hours  valGANciclovir 450 milliGRAM(s) Oral <User Schedule>  vancomycin    Solution 125 milliGRAM(s) Oral every 12 hours    MEDICATIONS  (PRN):  acetaminophen     Tablet .. 650 milliGRAM(s) Oral every 6 hours PRN Temp greater or equal to 38C (100.4F), Mild Pain (1 - 3)  melatonin 3 milliGRAM(s) Oral at bedtime PRN Insomnia      ALLERGIES:  Allergies    No Known Allergies    Intolerances        LABS:                        11.6   9.30  )-----------( 161      ( 06 Mar 2023 09:07 )             36.8     03-06    133<L>  |  103  |  76<H>  ----------------------------<  247<H>  4.4   |  17<L>  |  3.11<H>    Ca    8.3<L>      06 Mar 2023 09:07  Phos  4.8     03-06  Mg     2.5     03-06        Urinalysis Basic - ( 06 Mar 2023 17:49 )    Color: Yellow / Appearance: Slightly Turbid / S.022 / pH: x  Gluc: x / Ketone: Trace  / Bili: Negative / Urobili: Negative   Blood: x / Protein: 30 mg/dL / Nitrite: Negative   Leuk Esterase: Negative / RBC: 1 /hpf / WBC 1 /HPF   Sq Epi: x / Non Sq Epi: 2 /hpf / Bacteria: Negative        RADIOLOGY & ADDITIONAL TESTS: Reviewed. INTERVAL HPI/OVERNIGHT EVENTS: KEVIN OVN     SUBJECTIVE: Patient seen and examined at bedside. This AM patient states he is feeling well with no feelings of chest tightness, palpitations, SOB, lightheadedness, fevers, or chills. Reports wet cough with minimal sputum production.     OBJECTIVE:    VITAL SIGNS:  ICU Vital Signs Last 24 Hrs  T(C): 36.6 (07 Mar 2023 06:24), Max: 36.7 (06 Mar 2023 21:23)  T(F): 97.9 (07 Mar 2023 06:24), Max: 98 (06 Mar 2023 21:23)  HR: 101 (07 Mar 2023 06:24) (98 - 106)  BP: 107/74 (07 Mar 2023 06:24) (107/74 - 115/78)  BP(mean): --  ABP: --  ABP(mean): --  RR: 18 (07 Mar 2023 06:24) (18 - 18)  SpO2: 96% (07 Mar 2023 06:24) (95% - 98%)    O2 Parameters below as of 07 Mar 2023 06:24  Patient On (Oxygen Delivery Method): room air              03-06 @ 07:01  -  03-07 @ 07:00  --------------------------------------------------------  IN: 500 mL / OUT: 751 mL / NET: -251 mL      CAPILLARY BLOOD GLUCOSE      POCT Blood Glucose.: 207 mg/dL (07 Mar 2023 02:14)      PHYSICAL EXAM:    General: NAD  HEENT: NC/AT; PERRL, clear conjunctiva  Neck: supple  Respiratory: Slightly diminished breath sounds at right base but improved from prior exam   Cardiovascular: +S1/S2; RRR  Abdomen: soft, NT/mildly distended; +BS x4  Extremities:  no LE edema  Vascular: WWP, 2+ peripheral pulses b/l;  Skin: normal color and turgor; no rash  Neurological: A&Ox3, move all extremities. CN II-XII intact    MEDICATIONS:  MEDICATIONS  (STANDING):  albuterol/ipratropium for Nebulization 3 milliLiter(s) Nebulizer every 6 hours  apixaban 5 milliGRAM(s) Oral every 12 hours  aspirin enteric coated 81 milliGRAM(s) Oral daily  atorvastatin 10 milliGRAM(s) Oral at bedtime  cefepime   IVPB 1000 milliGRAM(s) IV Intermittent every 24 hours  dextrose 5%. 1000 milliLiter(s) (50 mL/Hr) IV Continuous <Continuous>  dextrose 5%. 1000 milliLiter(s) (100 mL/Hr) IV Continuous <Continuous>  dextrose 50% Injectable 25 Gram(s) IV Push once  dextrose 50% Injectable 12.5 Gram(s) IV Push once  dextrose 50% Injectable 25 Gram(s) IV Push once  dextrose Oral Gel 15 Gram(s) Oral once  folic acid 1 milliGRAM(s) Oral daily  glucagon  Injectable 1 milliGRAM(s) IntraMuscular once  guaiFENesin Oral Liquid (Sugar-Free) 200 milliGRAM(s) Oral every 6 hours  insulin glargine Injectable (LANTUS) 20 Unit(s) SubCutaneous at bedtime  insulin lispro (ADMELOG) corrective regimen sliding scale   SubCutaneous <User Schedule>  insulin lispro (ADMELOG) corrective regimen sliding scale   SubCutaneous three times a day before meals  insulin lispro Injectable (ADMELOG) 4 Unit(s) SubCutaneous before dinner  insulin lispro Injectable (ADMELOG) 6 Unit(s) SubCutaneous before lunch  insulin lispro Injectable (ADMELOG) 12 Unit(s) SubCutaneous before breakfast  lactated ringers. 1000 milliLiter(s) (100 mL/Hr) IV Continuous <Continuous>  metoprolol succinate  milliGRAM(s) Oral daily  nystatin    Suspension 594723 Unit(s) Oral <User Schedule>  pantoprazole    Tablet 40 milliGRAM(s) Oral before breakfast  predniSONE   Tablet 60 milliGRAM(s) Oral daily  sodium chloride 3%  Inhalation 4 milliLiter(s) Inhalation every 12 hours  tacrolimus 0.5 milliGRAM(s) Oral <User Schedule>  trimethoprim   80 mG/sulfamethoxazole 400 mG 1 Tablet(s) Oral every 48 hours  valGANciclovir 450 milliGRAM(s) Oral <User Schedule>  vancomycin    Solution 125 milliGRAM(s) Oral every 12 hours    MEDICATIONS  (PRN):  acetaminophen     Tablet .. 650 milliGRAM(s) Oral every 6 hours PRN Temp greater or equal to 38C (100.4F), Mild Pain (1 - 3)  melatonin 3 milliGRAM(s) Oral at bedtime PRN Insomnia      ALLERGIES:  Allergies    No Known Allergies    Intolerances        LABS:                        11.6   9.30  )-----------( 161      ( 06 Mar 2023 09:07 )             36.8     03-06    133<L>  |  103  |  76<H>  ----------------------------<  247<H>  4.4   |  17<L>  |  3.11<H>    Ca    8.3<L>      06 Mar 2023 09:07  Phos  4.8     03-06  Mg     2.5     03-06        Urinalysis Basic - ( 06 Mar 2023 17:49 )    Color: Yellow / Appearance: Slightly Turbid / S.022 / pH: x  Gluc: x / Ketone: Trace  / Bili: Negative / Urobili: Negative   Blood: x / Protein: 30 mg/dL / Nitrite: Negative   Leuk Esterase: Negative / RBC: 1 /hpf / WBC 1 /HPF   Sq Epi: x / Non Sq Epi: 2 /hpf / Bacteria: Negative        RADIOLOGY & ADDITIONAL TESTS: Reviewed.

## 2023-03-07 NOTE — PROGRESS NOTE ADULT - PROBLEM SELECTOR PLAN 3
Meeting sepsis criteria w/ tachycardia to 140s, RR 24, oral temp 100.1 --> likely higher rectal temp, w/ PNA as possible source given symptoms, CT chest findings.   - treatment of PNA as above: vanc and cefepime  - f/u transplant ID recs  - ID would like to decrease steroids for AIHA (patient currently on prednisone 75 QD) --> f/u heme onc recs on dosing Meeting sepsis criteria w/ tachycardia to 140s, RR 24, oral temp 100.1 --> likely higher rectal temp, w/ PNA as possible source given symptoms, CT chest findings.   - treatment of PNA as above: vanc and cefepime  - f/u transplant ID recs  - Steroids decreased to 60 mg per ID and Heme Onc reccomendation  - Prelim BCX positive for gram positive cocci in pairs, f/u final cultures

## 2023-03-07 NOTE — PROGRESS NOTE ADULT - PROBLEM SELECTOR PLAN 9
Followed by [facility] as outpatient; admitted 6.5 in Jan 2023, treated w/ 4d IV dexamethasone.   Per hematology, recommended to avoid blood transfusion unless patient is symptomatic due to risk of hyperhemolysis. Transfusion is okay from heart transplant perspective however per blood bank It will need to be emergent.   - maintain active T/S  - Hematology consulted for help with steroid management in setting of acute infection; f/u recs  - labs not concerning for hemolysis

## 2023-03-07 NOTE — DISCHARGE NOTE PROVIDER - CARE PROVIDERS DIRECT ADDRESSES
[email or web address]

## 2023-03-07 NOTE — PROGRESS NOTE ADULT - ATTENDING COMMENTS
70 yo male with h/o NICM s/p heart transplant on chronic prednisone now with elevated fungitell. Pt denies SOB at rest. Mild LOBATO. Pt more concerned with abdominal distention. Pt says he has had abdominal distension since his heart transplant. No pain. No constipation.  Pt denies fever chills or rash. He does have a cough and is frustrated that ne cannot bring up phlegm. Pt with h/o  mucous impaction of bronchus intermedius.  Chronic RML occlusion     Pt is on Prednisone 60 mg in addition to bactrim for  PJP prophylaxis.  CT chest on this admission mucous  impaction of BI. No GGO.     A/P 70 yo male with heart transplant chronically on chronic prednisone and currently on bactrim for PJP prophylaxis.        Fungitel is elevated. However , no evidence of PJP pneumonia. Not sure why fungitel drawn.    Recommend:  1. Continue d monitoring of respiratory symptoms and oxygenation. Currently on room air.  2. No clinical evidence of PJP. Pt on bactrim prophylaxis.  3.  No role for bronchoscopy.  4. Would look for other source of fungitel besides lungs .

## 2023-03-07 NOTE — DISCHARGE NOTE PROVIDER - PROVIDER TOKENS
PROVIDER:[TOKEN:[[id number]],FOLLOWUP:[2 weeks],ESTABLISHEDPATIENT:[T]],PROVIDER:[TOKEN:[[id number]],FOLLOWUP:[2 weeks],ESTABLISHEDPATIENT:[T]]

## 2023-03-07 NOTE — PROGRESS NOTE ADULT - PROBLEM SELECTOR PLAN 3
- Home regimen: pravastatin 20mg daily  - LDL goal <70 . Pt LDL 51  - Continue atorvastatin 10mg daily while in patient  - Restart home statin upon discharge if not contraindicated.

## 2023-03-07 NOTE — PROGRESS NOTE ADULT - PROBLEM SELECTOR PLAN 1
-test BG AC/HS  -C/w Lantus 20 units QHS for now  -Adjust Admelog 14-6-4 w/meals. HOLD IF NOT EATING   -c/w Admelog moderate correction scale AC and mod HS scale for now  -Needs RD consult  -On prednisone 60mg PO daily. Notify endocrine team if this is changed.   - Please teach and allow pt to do own finger sticks and insulin injections under RN supervision in case pt  is discharged home on high dose steroids requiring insulin therapy.   Please teach use of insulin pen  Please document teach back  Discharge plan:  -Will be determined according to BG/insulin needs/PO intake and steroid therapy at time of discharge.  -Make sure pt has Rx for all DM supplies and insulin/ DM meds.  - Home care due to new DM diagnosis and likely need of insulin therapy  - Patient to call doctor with persistent high or low BG at home.   - Recommend routine outpatient ophthalmology, podiatry   - Will need endocrinology f/u if pt send home on insulin/high dose steroids Can follow at [facility]. [address] [place] [address] [address]. Phone . Call for apt closer to discharge

## 2023-03-07 NOTE — PROGRESS NOTE ADULT - PROBLEM SELECTOR PLAN 11
Diet: DASH, Carb consistent  DVT: Eliquis 5mg BID  Dispo: Home with Home PT   Code Status: Full Code     Patient ask that we do not discuss this case with his family at this time.

## 2023-03-07 NOTE — DISCHARGE NOTE PROVIDER - NSDCMRMEDTOKEN_GEN_ALL_CORE_FT
aspirin 81 mg oral tablet:   Bactrim 400 mg-80 mg oral tablet: 1 tab(s) orally once a day  febuxostat 40 mg oral tablet: 1 tab(s) orally once a day  folic acid 1 mg oral tablet: 1 tab(s) orally once a day  losartan 100 mg oral tablet: 1 tab(s) orally once a day  metoprolol succinate 200 mg oral tablet, extended release: 1 tab(s) orally once a day  pantoprazole 40 mg oral delayed release tablet: 1 tab(s) orally once a day  pravastatin 20 mg oral tablet: 1 tab(s) orally once a day  predniSONE 50 mg oral tablet: 1.5 tab(s) orally once a day   tacrolimus 5 mg oral capsule: 1 cap(s) orally 2 times a day   valGANciclovir 450 mg oral tablet: 2 tab(s) orally once a day   Admelog SoloStar 100 units/mL injectable solution: 4 unit(s) subcutaneous 3 times a day (with meals)     Test Script Please do not fill  aspirin 81 mg oral tablet:   Bactrim 400 mg-80 mg oral tablet: 1 tab(s) orally once a day  febuxostat 40 mg oral tablet: 1 tab(s) orally once a day  folic acid 1 mg oral tablet: 1 tab(s) orally once a day  HumaLOG 100 units/mL injectable solution: 14 unit(s) injectable before breakfast  4 unit(s) injectible before lunch and dinner  Lantus Solostar Pen 100 units/mL subcutaneous solution: 14 unit(s) subcutaneous once a day (at bedtime)    Test Script; please do not fill    losartan 100 mg oral tablet: 1 tab(s) orally once a day  metoprolol succinate 200 mg oral tablet, extended release: 1 tab(s) orally once a day  pantoprazole 40 mg oral delayed release tablet: 1 tab(s) orally once a day  pravastatin 20 mg oral tablet: 1 tab(s) orally once a day  predniSONE 50 mg oral tablet: 1.5 tab(s) orally once a day   tacrolimus 5 mg oral capsule: 1 cap(s) orally 2 times a day   valGANciclovir 450 mg oral tablet: 2 tab(s) orally once a day   Admelog SoloStar 100 units/mL injectable solution: 4 unit(s) subcutaneous 3 times a day (with meals)     Test Script Please do not fill  apixaban 5 mg oral tablet: 1 tab(s) orally every 12 hours  aspirin 81 mg oral tablet:   febuxostat 40 mg oral tablet: 1 tab(s) orally once a day  folic acid 1 mg oral tablet: 1 tab(s) orally once a day  HumaLOG 100 units/mL injectable solution: 14 unit(s) injectable before breakfast  4 unit(s) injectible before lunch and dinner  Lantus Solostar Pen 100 units/mL subcutaneous solution: 14 unit(s) subcutaneous once a day (at bedtime)    Test Script; please do not fill    losartan 25 mg oral tablet: 1 tab(s) orally once a day  metoprolol succinate 200 mg oral tablet, extended release: 1 tab(s) orally once a day  nystatin 100,000 units/mL oral suspension: 5 milliliter(s) orally   pantoprazole 40 mg oral delayed release tablet: 1 tab(s) orally once a day (before a meal)  pravastatin 20 mg oral tablet: 1 tab(s) orally once a day  predniSONE 20 mg oral tablet: 3 tab(s) orally once a day  sulfamethoxazole-trimethoprim 400 mg-80 mg oral tablet: 1 tab(s) orally every 24 hours  tacrolimus 0.5 mg oral capsule: 5 cap(s) orally   valGANciclovir 450 mg oral tablet: 1 tab(s) orally once a day   Admelog 100 units/mL injectable solution: 26 unit(s) injectable once a day (with breakfast)  Admelog 100 units/mL injectable solution: 10 unit(s) injectable once a day (with lunch)  Admelog 100 units/mL injectable solution: 7 unit(s) injectable once a day (with dinner)  apixaban 5 mg oral tablet: 1 tab(s) orally every 12 hours  aspirin 81 mg oral tablet: 1 tab(s) orally once a day  febuxostat 40 mg oral tablet: 1 tab(s) orally once a day  folic acid 1 mg oral tablet: 1 tab(s) orally once a day  insulin glargine 100 units/mL subcutaneous solution: 18 unit(s) subcutaneous once a day (at bedtime)  losartan 25 mg oral tablet: 1 tab(s) orally once a day  metoprolol succinate 200 mg oral tablet, extended release: 1 tab(s) orally once a day  nystatin 100,000 units/mL oral suspension: 5 milliliter(s) orally 4 times a day  pantoprazole 40 mg oral delayed release tablet: 1 tab(s) orally once a day (before a meal)  pravastatin 20 mg oral tablet: 1 tab(s) orally once a day  predniSONE 20 mg oral tablet: 3 tab(s) orally once a day  sulfamethoxazole-trimethoprim 400 mg-80 mg oral tablet: 1 tab(s) orally every 24 hours  tacrolimus 0.5 mg oral capsule: 5 cap(s) orally 2 times a day   valGANciclovir 450 mg oral tablet: 1 tab(s) orally once a day   Admelog 100 units/mL injectable solution: 6 unit(s) injectable once a day  Admelog 100 units/mL injectable solution: 26 unit(s) injectable once a day (with breakfast)  Admelog 100 units/mL injectable solution: 10 unit(s) injectable once a day (with lunch)  apixaban 5 mg oral tablet: 1 tab(s) orally every 12 hours  aspirin 81 mg oral tablet: 1 tab(s) orally once a day  febuxostat 40 mg oral tablet: 1 tab(s) orally once a day  folic acid 1 mg oral tablet: 1 tab(s) orally once a day  insulin glargine 100 units/mL subcutaneous solution: 18 unit(s) subcutaneous once a day (at bedtime)  losartan 25 mg oral tablet: 1 tab(s) orally once a day  metoprolol succinate 200 mg oral tablet, extended release: 1 tab(s) orally once a day  nystatin 100,000 units/mL oral suspension: 5 milliliter(s) orally 4 times a day  pantoprazole 40 mg oral delayed release tablet: 1 tab(s) orally once a day (before a meal)  pravastatin 20 mg oral tablet: 1 tab(s) orally once a day  predniSONE 20 mg oral tablet: 3 tab(s) orally once a day  sulfamethoxazole-trimethoprim 400 mg-80 mg oral tablet: 1 tab(s) orally every 24 hours  tacrolimus 0.5 mg oral capsule: 5 cap(s) orally 2 times a day   valGANciclovir 450 mg oral tablet: 1 tab(s) orally once a day

## 2023-03-07 NOTE — DISCHARGE NOTE PROVIDER - HOSPITAL COURSE
Discharge Summary     Admission diagnoses:   a flutter    Discharge diagnoses:   Human Metapneumovirus    Hospital Course:   For full details, please see H&P, progress notes, consult notes, and ancillary notes. Briefly, 71 y/o male with hx of nonischemic cardiomyopathy s/p HM2 LVAD, underwent OHT 2/23/18 with hepatitis C donor, hx of hemolytic anemia presented w/ 1d chest tightness, found to have new onset aflutter w/ RVR, Patient found to have human metapneumovirus with concern for superimposed bacterial infection given elevated procal and CT findings of RLL and RML collapse. Course c/b elevated fungitell however patient stable on room air.     #a flutter  Patient initially presented with atrial flutter however shortly after admission converted to sinus tachycardia. Patient persistently tachycardic on home metoprolol, Diltiazem added for improved rate control. Patient's symptoms improved prior to discharge.     # Infectious etiology   Patient with RVP positive for Human MetaPneumovirus and CT scan with collapsed RLL and RML. Pulmonary consulted for possible bronchoscopy however given patient was comfortable on room air, they did not feel he warranted a bronch. Procalcitonin and fungitell also elevated however sputum culture, PCP testing, aspergillosis testing, histoplasmosis, blood cultures all negative. Patient was treated with a 7 day course of cefepime with improvement of symptoms.     #Hyperglycemia  Patient also with hyperglycemia in setting of steroid use for AIHA. Steroids decreased from 75 mg to 60 mg and insulin regimen adjusted accordingly. Patient eventually discharged on ****     On day of discharge, patient is clinically stable with no new exam findings or acute symptoms compared to prior. The patient was seen by the attending physician on the date of discharge and deemed stable and acceptable for discharge. The patient's chronic medical conditions were treated accordingly per the patient's home medication regimen. The patient's medication reconciliation, follow up appointments, discharge instructions, and significant lab and diagnostic studies are as noted.     Discharge follow up action items:     1. Follow up with PCP, Transplant ID, Transplant Cardiology, Heart failure, Endocrinology   2. Follow up labs, path, & imaging  3. Medication changes    Patient's ordered code status: ***    Patient disposition: ***     Discharge Summary     Admission diagnoses:   a flutter    Discharge diagnoses:   Human Metapneumovirus    Hospital Course:   For full details, please see H&P, progress notes, consult notes, and ancillary notes. Briefly, 71 y/o male with hx of nonischemic cardiomyopathy s/p HM2 LVAD, underwent OHT 2/23/18 with hepatitis C donor, hx of hemolytic anemia presented w/ 1d chest tightness, found to have new onset aflutter w/ RVR, Patient found to have human metapneumovirus with concern for superimposed bacterial infection given elevated procal and CT findings of RLL and RML collapse. Course c/b elevated fungitell however patient stable on room air.     #a flutter  Patient initially presented with atrial flutter however shortly after admission converted to sinus tachycardia. Patient persistently tachycardic on home metoprolol, Diltiazem added for improved rate control. Patient's symptoms improved prior to discharge.     # Infectious etiology   Patient with RVP positive for Human MetaPneumovirus and CT scan with collapsed RLL and RML. Pulmonary consulted for possible bronchoscopy however given patient was comfortable on room air, they did not feel he warranted a bronch. Procalcitonin and fungitell also elevated however sputum culture, PCP testing, aspergillosis testing, histoplasmosis, blood cultures all negative. Patient was treated with a 7 day course of cefepime with improvement of symptoms.     #Hyperglycemia  Patient also with hyperglycemia in setting of steroid use for AIHA. Steroids decreased from 75 mg to 60 mg and insulin regimen adjusted accordingly. Patient eventually discharged on lantus 20, admelog 7 TID with meals.     On day of discharge, patient is clinically stable with no new exam findings or acute symptoms compared to prior. The patient was seen by the attending physician on the date of discharge and deemed stable and acceptable for discharge. The patient's chronic medical conditions were treated accordingly per the patient's home medication regimen. The patient's medication reconciliation, follow up appointments, discharge instructions, and significant lab and diagnostic studies are as noted.     Discharge follow up action items:     1. Follow up with PCP, Transplant ID, Transplant Cardiology, Heart failure, Endocrinology   2. Follow up labs, path, & imaging  3. Medication changes    Patient's ordered code status: full     Patient disposition: home     Discharge Summary     Admission diagnoses:   a flutter    Discharge diagnoses:   Human Metapneumovirus    Hospital Course:   For full details, please see H&P, progress notes, consult notes, and ancillary notes. Briefly, 73 y/o male with hx of nonischemic cardiomyopathy s/p HM2 LVAD, underwent OHT 2/23/18 with hepatitis C donor, hx of hemolytic anemia presented w/ 1d chest tightness, found to have new onset aflutter w/ RVR, Patient found to have human metapneumovirus with concern for superimposed bacterial infection given elevated procal and CT findings of RLL and RML collapse. Course c/b elevated fungitell however patient stable on room air.     #a flutter  Patient initially presented with atrial flutter however shortly after admission converted to sinus tachycardia. Patient persistently tachycardic on home metoprolol, Diltiazem added for improved rate control. Patient's symptoms improved prior to discharge.     # Infectious etiology   Patient with RVP positive for Human MetaPneumovirus and CT scan with collapsed RLL and RML. Pulmonary consulted for possible bronchoscopy however given patient was comfortable on room air, they did not feel he warranted a bronch. Procalcitonin and fungitell also elevated however sputum culture, PCP testing, aspergillosis testing, histoplasmosis, blood cultures all negative. Patient was treated with a 7 day course of cefepime with improvement of symptoms.     #Hyperglycemia  Patient also with hyperglycemia in setting of steroid use for AIHA. Steroids decreased from 75 mg to 60 mg and insulin regimen adjusted accordingly. Patient eventually discharged on lantus 18, admelog 26-10-7 with meals.     On day of discharge, patient is clinically stable with no new exam findings or acute symptoms compared to prior. The patient was seen by the attending physician on the date of discharge and deemed stable and acceptable for discharge. The patient's chronic medical conditions were treated accordingly per the patient's home medication regimen. The patient's medication reconciliation, follow up appointments, discharge instructions, and significant lab and diagnostic studies are as noted.     Discharge follow up action items:     1. Follow up with PCP, Transplant ID, Transplant Cardiology, Heart failure, Endocrinology   2. Follow up labs, path, & imaging  3. Medication changes    Patient's ordered code status: full     Patient disposition: home

## 2023-03-07 NOTE — DISCHARGE NOTE PROVIDER - NSFOLLOWUPCLINICS_GEN_ALL_ED_FT
[facility]  Ophthalmology  [address], [address]  [place] [address]  Phone: [phone]  Fax:   Follow Up Time: Routine    [facility]  Podiatry  [address], [facility] - [address]rd [place] [address]  Phone: [phone]  Fax:   Follow Up Time: Routine    [facility]  Infectious Disease  [address], [place]ious [place] [address]  Phone: [phone]  Fax:   Follow Up Time: 2 weeks

## 2023-03-07 NOTE — PROGRESS NOTE ADULT - ASSESSMENT
71 year old M with a PMH of HTN, NICM c/b HFrEF s/p LVAD 2017, and then subsequently s/p heart transplant from HCV donor (treated) in 2018 with post-op course complicated by graft dysfunction, who initially presented to the ED with cough and chest tightness.     Cough  Afebrile, no leukocytosis  RVP + for hMPV   CT with impacted RML and RLL bronchi, and patchy consolidations in RUL and LLL  Initially on Vancomycin and Cefepime   Now on Cefepime only (MRSA PCR negative)  Legionella Ur Ag negative, serum Cryptococcal Ag negative, EBV IgM negative  UA negative  Blood cultures from March 3rd with 1/4 bottles with GPC in pairs but blood PCR negative  Procalcitonin 2.39, Cr 2->3 (NORMAN)  On Valcyte and Bactrim prophylaxis   Fungitell, Galactomannan, Histo Ur Ag, BK PCR pending     OVERALL  Suspect hMPV pneumonia in heart transplant/immunocompromised recipient   R/o superimposed bacterial infection   NORMAN, s/p heart transplant 2018  H/o HCV from donor s/p treatment   H/o C Diff in 2020     RECOMMENDATIONS   -Continue Cefepime  -Bactrim/Valcyte prophylaxis, renally dose   -F/u Fungitell, Galactomannan, Histo Ur Ag, BK PCR  -F/u AFB and fungal sputum culture  -F/u sputum PCP PCR  although if pt was on prophylaxis with Bactrim lower on differential list   -Check routine sputum culture (pending collection)   -Check Parvovirus PCR serum (ordered)  -CMV PCR negative  -Suspect that 1/4 blood culture bottles with GPC in pairs (blood PCR negative) might be procurement contaminant - will discuss with Dr Lujan     All recommendations are tentative pending Attending Attestation.    Estuardo Diop MD, PGY-4   ID Fellow  [employer] Teams Preferred  After 5pm/weekends call [phone]  71 year old M with a PMH of HTN, NICM c/b HFrEF s/p LVAD 2017, and then subsequently s/p heart transplant from HCV donor (treated) in 2018 with post-op course complicated by graft dysfunction, who initially presented to the ED with cough and chest tightness.     Cough  Afebrile, no leukocytosis  RVP + for hMPV   CT with impacted RML and RLL bronchi, and patchy consolidations in RUL and LLL  Initially on Vancomycin and Cefepime   Now on Cefepime only (MRSA PCR negative)  Legionella Ur Ag negative, serum Cryptococcal Ag negative, EBV IgM negative  UA negative  Blood cultures from March 3rd with 1/4 bottles with GPC in pairs but blood PCR negative  Fungitell 300  Procalcitonin 2.39, Cr 2->3 (NORMAN)  On Valcyte and Bactrim prophylaxis   Fungitell, Galactomannan, Histo Ur Ag, BK PCR pending     OVERALL  hMPV pneumonia in heart transplant/immunocompromised recipient   R/o superimposed bacterial infection   Elevated Fungitell   [facility]MAN, s/p heart transplant 2018  H/o HCV from donor s/p treatment   H/o C Diff in 2020     RECOMMENDATIONS   -Elevated Fungitell is concerning - follow up on Galactomannan and sputum fungal culture, would also follow up with Pulm in regards to possible bronch given this finding   -Suspect that 1/4 blood culture bottles with GPC in pairs (blood PCR negative) might be procurement contaminant - f/u speciation   -Continue Cefepime  -Bactrim/Valcyte prophylaxis, renally dose   -F/u Galactomannan, Histo Ur Ag, BK PCR  -F/u AFB and fungal sputum culture  -F/u sputum PCP PCR (although pt was on prophylaxis with Bactrim which makes PCP PNA significantly less likely)  -Check routine sputum culture (pending collection)   -Check Parvovirus PCR serum (ordered)  -CMV PCR negative    All recommendations are tentative pending Attending Attestation.    Estuardo Diop MD, PGY-4   ID Fellow  [employer] Teams Preferred  After 5pm/weekends call [phone]  71 year old M with a PMH of HTN, NICM c/b HFrEF s/p LVAD 2017, and then subsequently s/p heart transplant from HCV donor (treated) in 2018 with post-op course complicated by graft dysfunction, who initially presented to the ED with cough and chest tightness.     Cough  Afebrile, no leukocytosis  RVP + for hMPV   CT with impacted RML and RLL bronchi, and patchy consolidations in RUL and LLL  Initially on Vancomycin and Cefepime   Now on Cefepime only (MRSA PCR negative)  Legionella Ur Ag negative, serum Cryptococcal Ag negative, EBV IgM negative  UA negative  Blood cultures from March 3rd with 1/4 bottles with GPC in pairs but blood PCR negative  Fungitell 300  Procalcitonin 2.39, Cr 2->3 (NORMAN)  On Valcyte and Bactrim prophylaxis   Fungitell, Galactomannan, Histo Ur Ag, BK PCR pending     OVERALL  hMPV pneumonia in heart transplant/immunocompromised recipient   R/o superimposed bacterial infection   Elevated Fungitell   [facility]MAN, s/p heart transplant 2018  H/o HCV from donor s/p treatment   H/o C Diff in 2020     RECOMMENDATIONS   -Elevated Fungitell is concerning - follow up on Galactomannan and sputum fungal culture, would also follow up with Pulm in regards to possible bronch given this finding   -Suspect that 1/4 blood culture bottles with GPC in pairs (blood PCR negative) might be procurement contaminant - f/u speciation   -Continue Cefepime  -Bactrim/Valcyte prophylaxis, renally dose   -F/u Galactomannan, BK PCR  -F/u AFB and fungal sputum culture  -F/u sputum PCP PCR (although pt was on prophylaxis with Bactrim which makes PCP PNA significantly less likely)  -Check routine sputum culture (pending collection)   -Check Parvovirus PCR serum (ordered)  -CMV PCR negative  -Histoplasma Ur Ag negative     All recommendations are tentative pending Attending Attestation.    Estuardo Diop MD, PGY-4   ID Fellow  [employer] Teams Preferred  After 5pm/weekends call [phone]

## 2023-03-07 NOTE — PROGRESS NOTE ADULT - PROBLEM SELECTOR PLAN 4
Patient AOx1 on admission, AOx3 by time of interview though slow to respond to questions, falling asleep intermittently during exam. Considering infection-mediated vs 2/2 hyperglycemia.  - CTH negative for acute pathology  - glycemic control as below; monitor for signs of DKA Patient AOx1 on admission, AOx3 by time of interview though slow to respond to questions, falling asleep intermittently during exam. Considering infection-mediated vs 2/2 hyperglycemia.  RESOLVED  - CTH negative for acute pathology  - glycemic control as below; monitor for signs of DKA

## 2023-03-07 NOTE — PROGRESS NOTE ADULT - PROBLEM SELECTOR PLAN 5
Prednisone-induced hyperglycemia w/ blood glucose elevated to 400-500 on admission. Based on outpatient records, patient has been hyperglycemic in the past, last A1c 4.7; not currently on any JAVIER or insulin. Not given any insulin in ED.   - BHB wnl, anion gap wnl  - given 5u regular insulin;  at the time  - increase standing insulin based on 24h requirements  - started ISS, hypoglycemia protocol  - endocrine consulted for hyperglycemia  - started on lantus 18 units and admelog 7 units TID per endocrine however had episodes of hypoglycemia --> transitioned to admelog 10 - 6 - 4

## 2023-03-07 NOTE — DISCHARGE NOTE PROVIDER - NSDCCPCAREPLAN_GEN_ALL_CORE_FT
PRINCIPAL DISCHARGE DIAGNOSIS  Diagnosis: Viral pneumonia  Assessment and Plan of Treatment: You came to the hospital for chest tightness. You were found to have a viral infection of the lungs. Given your imaging findings and the fact that you are immunosuppressed due to the medications you take for your heart transplant, there was concern that you may also have a bacterial infection in your lungs. You were treated with IV antibiotics and your symptoms improved. Please follow up with your primary care provider in 1-2 weeks. If you develop worsening chest tightness, SOB or any other concerning symptoms, please return to your nearest emergency department.      SECONDARY DISCHARGE DIAGNOSES  Diagnosis: Atrial flutter  Assessment and Plan of Treatment: While in the hospital, your heart was found to be in a irregular rhythm. You were monitored with a cardiac monitor and our cardiologist and transplant team evaluated you. Please continue with your medications as directed and follow up with the transplant team in 2-3 weeks.    Diagnosis: Hyperglycemia  Assessment and Plan of Treatment: While you were in the hospital, it was found that ytour blood sugar is high. This is likely due to your steroid medications for your transplant. You were started on insulin to help control your blood sugars. Please continue to take your insulin as directed and follow up with the Endocrinology team for your scheduled appointment on 3/28 at 11 am at [address].    Diagnosis: DVT, lower extremity  Assessment and Plan of Treatment: While in the hospital, you were found to have blood clots in both legs. You were started on a blood thinner medication to prevent extension of these clots. Please continue to take your medication at directed and follow up with your primary care provider in 1-2 weeks.

## 2023-03-07 NOTE — PROGRESS NOTE ADULT - ATTENDING COMMENTS
above plans discussed with Dr. Birmingham    # hMPV PNA  # r/o superimposed bacterial/fungal infection  # NORMAN  # Aflutter with RVR  # AIHA on high dose steroids  # steroid induced hyperglycemia  # s/p heart transplant  # hx of C.diff colitis    - Pt initially presented chest tightness found to have AFlutter at rate 140bpm but self converted to sinus rhythm and now rate controlled  - given elevated [id number] score to 2, started on systemic AC (eliquis); fu with EP as outpatient for further management  - CT chest concerning for RML/RLL consolidation, started on IV vancomycin/cefepime for empiric coverage especially given his immunocompromised status but vanco dc'ed as MRSA PCR negative  - for now continue cefepime  - Bcx 1/4 with GPC, fu speciation and repeat BCx  - obtain TTE  - new onset NORMAN on admission; no signs of urinary retention (bladder scan negative), likely pre-renal in setting of active infection vs. ATN from vancomycin; send UA with microscopic exam as well as renal US; overall, SCr improving  - pt is on prednisone 75mg daily for AIHA, H/H has been stable and pt is clinically improving in terms of PNA: will discuss with heme re: reducing the dose as pt at high risk of infections   - FS now better controlled on current regimen  - tacro level at goal today  - pt is currently stable on RA, improvement in symptoms - hold off on pulm consult for bronchoscopy at this time  - appreciate interdisciplinary team recommendations from transplant cards, ID, endo    Brittany Trevizo MD  Division of Hospital Medicine  Contact via [employer] Teams  Office: [phone]

## 2023-03-07 NOTE — DISCHARGE NOTE PROVIDER - ATTENDING DISCHARGE PHYSICAL EXAMINATION:
.  VITAL SIGNS:  T(C): 36.6 (03-18-23 @ 11:43), Max: 36.6 (03-18-23 @ 11:43)  T(F): 97.8 (03-18-23 @ 11:43), Max: 97.8 (03-18-23 @ 11:43)  HR: 100 (03-18-23 @ 11:43) (93 - 100)  BP: 122/77 (03-18-23 @ 11:43) (122/77 - 130/90)  BP(mean): --  RR: 18 (03-18-23 @ 11:43) (18 - 18)  SpO2: 98% (03-18-23 @ 11:43) (97% - 99%)  Wt(kg): --    PHYSICAL EXAM:    Constitutional: WDWN resting comfortably in bed; NAD  Head: NC/AT  Eyes: PERRL, EOMI, anicteric sclera  ENT: no nasal discharge; uvula midline, no oropharyngeal erythema or exudates; MMM  Neck: supple; no JVD or thyromegaly  Respiratory: CTA B/L; no W/R/R, no retractions  Cardiac: +S1/S2; RRR; no M/R/G; PMI non-displaced  Gastrointestinal: abdomen soft, NT/ND; no rebound or guarding; +BSx4  Back: spine midline, no bony tenderness or step-offs; no CVAT B/L  Extremities: WWP, no clubbing or cyanosis; no peripheral edema  Musculoskeletal: NROM x4; no joint swelling, tenderness or erythema  Vascular: 2+ radial, femoral, DP/PT pulses B/L  Dermatologic: skin warm, dry and intact; no rashes, wounds, or scars  Lymphatic: no submandibular or cervical LAD  Neurologic: AAOx3; CNII-XII grossly intact; no focal deficits  Psychiatric: affect and characteristics of appearance, verbalizations, behaviors are appropriate

## 2023-03-07 NOTE — PROGRESS NOTE ADULT - PROBLEM SELECTOR PLAN 1
P/w chest tightness x1d associated w/ dry cough; likely multifactorial in s/o atrial flutter w/ HR 140s, notes that his chest tightness is improving as HR is lowered. Also considering contribution from CT chest findings of impacted R middle and lower lobe bronchi w/ near collapse of R middle/lower lobes.   - s/p Vanc/Zosyn in ED, 1L IVF  - f/u Bcx  - Procal elevated to 2.39; neutrophilic predominance on diff with 1% bands in immunosuppresed   - continue abx: cefepime 1g q24h (3/4 - ) --> adjusted for NORMAN  -EBV negative for active infection, MRSA negative  - f/u urine legionella, CMV, cryptococcal antigen, fungitell  - treatment of aflutter as below  - RVP positive for human metapneumovirus   -Pulm consulted for possible bronch; f/u recs  - Transplant ID following; f/u recs  - c/w ppx of bactrim and valcyclovir P/w chest tightness x1d associated w/ dry cough; likely multifactorial in s/o atrial flutter w/ HR 140s, notes that his chest tightness is improving as HR is lowered. Also considering contribution from CT chest findings of impacted R middle and lower lobe bronchi w/ near collapse of R middle/lower lobes.   - s/p Vanc/Zosyn in ED, 1L IVF  - f/u Bcx  - Procal elevated to 2.39; neutrophilic predominance on diff with 1% bands in immunosuppresed   - continue abx: cefepime 1g q24h (3/4 - ) --> adjusted for NORMAN  -EBV negative for active infection, MRSA negative  - f/u urine legionella, CMV, cryptococcal antigen, fungitell  - treatment of aflutter as below  - RVP positive for human metapneumovirus   -Pulm consulted for possible bronch; f/u recs (aggressive Airway clearance since patient not hypoxic)  - Transplant ID following; f/u recs  - c/w ppx of bactrim and valcyclovir

## 2023-03-07 NOTE — PROGRESS NOTE ADULT - SUBJECTIVE AND OBJECTIVE BOX
CHIEF COMPLAINT:    Interval Events:    REVIEW OF SYSTEMS:  Constitutional: [ ] negative [ ] fevers [ ] chills [ ] weight loss [ ] weight gain  HEENT: [ ] negative [ ] dry eyes [ ] eye irritation [ ] postnasal drip [ ] nasal congestion  CV: [ ] negative  [ ] chest pain [ ] orthopnea [ ] palpitations [ ] murmur  Resp: [ ] negative [ ] cough [ ] shortness of breath [ ] dyspnea [ ] wheezing [ ] sputum [ ] hemoptysis  GI: [ ] negative [ ] nausea [ ] vomiting [ ] diarrhea [ ] constipation [ ] abd pain [ ] dysphagia   : [ ] negative [ ] dysuria [ ] nocturia [ ] hematuria [ ] increased urinary frequency  Musculoskeletal: [ ] negative [ ] back pain [ ] myalgias [ ] arthralgias [ ] fracture  Skin: [ ] negative [ ] rash [ ] itch  Neurological: [ ] negative [ ] headache [ ] dizziness [ ] syncope [ ] weakness [ ] numbness  Psychiatric: [ ] negative [ ] anxiety [ ] depression  Endocrine: [ ] negative [ ] diabetes [ ] thyroid problem  Hematologic/Lymphatic: [ ] negative [ ] anemia [ ] bleeding problem  Allergic/Immunologic: [ ] negative [ ] itchy eyes [ ] nasal discharge [ ] hives [ ] angioedema  [ ] All other systems negative  [ ] Unable to assess ROS because ________    OBJECTIVE:  ICU Vital Signs Last 24 Hrs  T(C): 36.6 (07 Mar 2023 12:03), Max: 36.7 (06 Mar 2023 21:23)  T(F): 97.8 (07 Mar 2023 12:03), Max: 98 (06 Mar 2023 21:23)  HR: 109 (07 Mar 2023 12:03) (98 - 109)  BP: 114/70 (07 Mar 2023 12:03) (107/74 - 115/78)  BP(mean): --  ABP: --  ABP(mean): --  RR: 18 (07 Mar 2023 12:03) (18 - 18)  SpO2: 97% (07 Mar 2023 12:03) (96% - 98%)    O2 Parameters below as of 07 Mar 2023 12:03  Patient On (Oxygen Delivery Method): room air              03-06 @ 07:01  -  03-07 @ 07:00  --------------------------------------------------------  IN: 500 mL / OUT: 751 mL / NET: -251 mL      CAPILLARY BLOOD GLUCOSE      POCT Blood Glucose.: 210 mg/dL (07 Mar 2023 12:53)      PHYSICAL EXAM:  General: Male laying in bed in no acute distress  HEENT: Normal sclera  Lymph Nodes: No LAD  Respiratory: Faint crackles in bilateral bases Right worse than left  Cardiovascular: Regular rate and rhythm no m/r/g  Abdomen: Nontender nondistended  Extremities: No peripheral edema  Skin: No rashes  Neurological: No appreciable neurologic deficits  Psychiatry: Appropriate mood and affect    HOSPITAL MEDICATIONS:  MEDICATIONS  (STANDING):  albuterol/ipratropium for Nebulization 3 milliLiter(s) Nebulizer every 6 hours  apixaban 5 milliGRAM(s) Oral every 12 hours  aspirin enteric coated 81 milliGRAM(s) Oral daily  atorvastatin 10 milliGRAM(s) Oral at bedtime  cefepime   IVPB 1000 milliGRAM(s) IV Intermittent every 24 hours  dextrose 5%. 1000 milliLiter(s) (50 mL/Hr) IV Continuous <Continuous>  dextrose 5%. 1000 milliLiter(s) (100 mL/Hr) IV Continuous <Continuous>  dextrose 50% Injectable 25 Gram(s) IV Push once  dextrose 50% Injectable 12.5 Gram(s) IV Push once  dextrose 50% Injectable 25 Gram(s) IV Push once  dextrose Oral Gel 15 Gram(s) Oral once  folic acid 1 milliGRAM(s) Oral daily  glucagon  Injectable 1 milliGRAM(s) IntraMuscular once  guaiFENesin Oral Liquid (Sugar-Free) 200 milliGRAM(s) Oral every 6 hours  insulin glargine Injectable (LANTUS) 20 Unit(s) SubCutaneous at bedtime  insulin lispro (ADMELOG) corrective regimen sliding scale   SubCutaneous three times a day before meals  insulin lispro (ADMELOG) corrective regimen sliding scale   SubCutaneous <User Schedule>  insulin lispro Injectable (ADMELOG) 4 Unit(s) SubCutaneous before dinner  insulin lispro Injectable (ADMELOG) 6 Unit(s) SubCutaneous before lunch  metoprolol succinate  milliGRAM(s) Oral daily  nystatin    Suspension 338222 Unit(s) Oral <User Schedule>  pantoprazole    Tablet 40 milliGRAM(s) Oral before breakfast  predniSONE   Tablet 60 milliGRAM(s) Oral daily  sodium chloride 3%  Inhalation 4 milliLiter(s) Inhalation every 12 hours  tacrolimus 0.5 milliGRAM(s) Oral <User Schedule>  trimethoprim   80 mG/sulfamethoxazole 400 mG 1 Tablet(s) Oral every 48 hours  valGANciclovir 450 milliGRAM(s) Oral <User Schedule>  vancomycin    Solution 125 milliGRAM(s) Oral every 12 hours    MEDICATIONS  (PRN):  acetaminophen     Tablet .. 650 milliGRAM(s) Oral every 6 hours PRN Temp greater or equal to 38C (100.4F), Mild Pain (1 - 3)  melatonin 3 milliGRAM(s) Oral at bedtime PRN Insomnia      LABS:                        11.1   9.48  )-----------( 195      ( 07 Mar 2023 06:52 )             34.8     Hgb Trend: 11.1<--, 11.6<--, 12.4<--, 11.1<--, 10.7<--  03-07    135  |  105  |  76<H>  ----------------------------<  193<H>  4.0   |  18<L>  |  2.71<H>    Ca    7.9<L>      07 Mar 2023 06:52  Phos  4.0     03-07  Mg     2.5     03-07      Creatinine Trend: 2.71<--, 3.11<--, 2.85<--, 1.85<--, 2.04<--    Urinalysis Basic - ( 06 Mar 2023 17:49 )    Color: Yellow / Appearance: Slightly Turbid / S.022 / pH: x  Gluc: x / Ketone: Trace  / Bili: Negative / Urobili: Negative   Blood: x / Protein: 30 mg/dL / Nitrite: Negative   Leuk Esterase: Negative / RBC: 1 /hpf / WBC 1 /HPF   Sq Epi: x / Non Sq Epi: 2 /hpf / Bacteria: Negative            MICROBIOLOGY:       RADIOLOGY:  [ ] Reviewed and interpreted by me    PULMONARY FUNCTION TESTS:    EKG: CHIEF COMPLAINT: Heart transplant    Interval Events: Pt reports abdominal distension and some SOB    REVIEW OF SYSTEMS:  Constitutional: [ ] negative [ ] fevers [ ] chills [ ] weight loss [ ] weight gain  HEENT: [ ] negative [ ] dry eyes [ ] eye irritation [ ] postnasal drip [ ] nasal congestion  CV: [ ] negative  [ ] chest pain [ ] orthopnea [ ] palpitations [ ] murmur  Resp: [ ] negative [ ] cough [x ] shortness of breath [ ] dyspnea [ ] wheezing [ ] sputum [ ] hemoptysis  GI: [ ] negative [ ] nausea [ ] vomiting [ ] diarrhea [ ] constipation [ x] distension [ ] dysphagia   : [ ] negative [ ] dysuria [ ] nocturia [ ] hematuria [ ] increased urinary frequency  Musculoskeletal: [ ] negative [ ] back pain [ ] myalgias [ ] arthralgias [ ] fracture  Skin: [ ] negative [ ] rash [ ] itch  Neurological: [ ] negative [ ] headache [ ] dizziness [ ] syncope [ ] weakness [ ] numbness  Psychiatric: [ ] negative [ ] anxiety [ ] depression  Endocrine: [ ] negative [ ] diabetes [ ] thyroid problem  Hematologic/Lymphatic: [ ] negative [ ] anemia [ ] bleeding problem  Allergic/Immunologic: [ ] negative [ ] itchy eyes [ ] nasal discharge [ ] hives [ ] angioedema  [ x] All other systems negative  [ ] Unable to assess ROS because ________    OBJECTIVE:  ICU Vital Signs Last 24 Hrs  T(C): 36.6 (07 Mar 2023 12:03), Max: 36.7 (06 Mar 2023 21:23)  T(F): 97.8 (07 Mar 2023 12:03), Max: 98 (06 Mar 2023 21:23)  HR: 109 (07 Mar 2023 12:03) (98 - 109)  BP: 114/70 (07 Mar 2023 12:03) (107/74 - 115/78)  BP(mean): --  ABP: --  ABP(mean): --  RR: 18 (07 Mar 2023 12:03) (18 - 18)  SpO2: 97% (07 Mar 2023 12:03) (96% - 98%)    O2 Parameters below as of 07 Mar 2023 12:03  Patient On (Oxygen Delivery Method): room air              03-06 @ 07:01  -  03-07 @ 07:00  --------------------------------------------------------  IN: 500 mL / OUT: 751 mL / NET: -251 mL      CAPILLARY BLOOD GLUCOSE      POCT Blood Glucose.: 210 mg/dL (07 Mar 2023 12:53)      PHYSICAL EXAM:  General: Male laying in bed in no acute distress  Respiratory: Rhoncherous breath sounds mostly transmitted from above  Cardiovascular: Regular rate and rhythm no m/r/g  Abdomen: Nontender nondistended  Extremities: No peripheral edema  Neurological: No appreciable neurologic deficits  Psychiatry: Appropriate mood and affect    HOSPITAL MEDICATIONS:  MEDICATIONS  (STANDING):  albuterol/ipratropium for Nebulization 3 milliLiter(s) Nebulizer every 6 hours  apixaban 5 milliGRAM(s) Oral every 12 hours  aspirin enteric coated 81 milliGRAM(s) Oral daily  atorvastatin 10 milliGRAM(s) Oral at bedtime  cefepime   IVPB 1000 milliGRAM(s) IV Intermittent every 24 hours  dextrose 5%. 1000 milliLiter(s) (50 mL/Hr) IV Continuous <Continuous>  dextrose 5%. 1000 milliLiter(s) (100 mL/Hr) IV Continuous <Continuous>  dextrose 50% Injectable 25 Gram(s) IV Push once  dextrose 50% Injectable 12.5 Gram(s) IV Push once  dextrose 50% Injectable 25 Gram(s) IV Push once  dextrose Oral Gel 15 Gram(s) Oral once  folic acid 1 milliGRAM(s) Oral daily  glucagon  Injectable 1 milliGRAM(s) IntraMuscular once  guaiFENesin Oral Liquid (Sugar-Free) 200 milliGRAM(s) Oral every 6 hours  insulin glargine Injectable (LANTUS) 20 Unit(s) SubCutaneous at bedtime  insulin lispro (ADMELOG) corrective regimen sliding scale   SubCutaneous three times a day before meals  insulin lispro (ADMELOG) corrective regimen sliding scale   SubCutaneous <User Schedule>  insulin lispro Injectable (ADMELOG) 4 Unit(s) SubCutaneous before dinner  insulin lispro Injectable (ADMELOG) 6 Unit(s) SubCutaneous before lunch  metoprolol succinate  milliGRAM(s) Oral daily  nystatin    Suspension 247826 Unit(s) Oral <User Schedule>  pantoprazole    Tablet 40 milliGRAM(s) Oral before breakfast  predniSONE   Tablet 60 milliGRAM(s) Oral daily  sodium chloride 3%  Inhalation 4 milliLiter(s) Inhalation every 12 hours  tacrolimus 0.5 milliGRAM(s) Oral <User Schedule>  trimethoprim   80 mG/sulfamethoxazole 400 mG 1 Tablet(s) Oral every 48 hours  valGANciclovir 450 milliGRAM(s) Oral <User Schedule>  vancomycin    Solution 125 milliGRAM(s) Oral every 12 hours    MEDICATIONS  (PRN):  acetaminophen     Tablet .. 650 milliGRAM(s) Oral every 6 hours PRN Temp greater or equal to 38C (100.4F), Mild Pain (1 - 3)  melatonin 3 milliGRAM(s) Oral at bedtime PRN Insomnia      LABS:                        11.1   9.48  )-----------( 195      ( 07 Mar 2023 06:52 )             34.8     Hgb Trend: 11.1<--, 11.6<--, 12.4<--, 11.1<--, 10.7<--  03-07    135  |  105  |  76<H>  ----------------------------<  193<H>  4.0   |  18<L>  |  2.71<H>    Ca    7.9<L>      07 Mar 2023 06:52  Phos  4.0     03-07  Mg     2.5     03-07      Creatinine Trend: 2.71<--, 3.11<--, 2.85<--, 1.85<--, 2.04<--    Urinalysis Basic - ( 06 Mar 2023 17:49 )    Color: Yellow / Appearance: Slightly Turbid / S.022 / pH: x  Gluc: x / Ketone: Trace  / Bili: Negative / Urobili: Negative   Blood: x / Protein: 30 mg/dL / Nitrite: Negative   Leuk Esterase: Negative / RBC: 1 /hpf / WBC 1 /HPF   Sq Epi: x / Non Sq Epi: 2 /hpf / Bacteria: Negative            MICROBIOLOGY:       RADIOLOGY:  [ ] Reviewed and interpreted by me    PULMONARY FUNCTION TESTS:    EKG:

## 2023-03-07 NOTE — PROGRESS NOTE ADULT - ASSESSMENT
71y/o M w/h/o HTN, HLD, NICM, chronic systolic heart failure s/p HM2 LVAD (6/2017) s/p heart transplant from Hep. C donor (treated) 2/23/18 (post op course complicated by graft dysfunction treated by plasmapheresis, IVIG, and rituximab). No DM hx per pt report. Here w/ chest tightness x1d found to be in aflutter 2/2 pneumonia and more recently found to have Hemolytic anemia> on steroids. Endocrinology consulted for management steroid induced hyperglycemia. olerating POs with BG levels improved while on present insulin doses except with persistent hyperglycemia ac [facility] after pt receives am Prednisone. On Prednisone 60mg started today. Will continue to adjust insulin regimen to BG goal (100-180mg/dl).     Spoke to pt about the likely need of DM meds if he is discharged home on steroids. Pt verbalized understanding.

## 2023-03-07 NOTE — PROGRESS NOTE ADULT - ASSESSMENT
71 y/o male with hx of nonischemic cardiomyopathy s/p HM2 LVAD, underwent OHT 2/23/18 with hepatitis C donor, hx of hemolytic anemia presented w/ 1d chest tightness, found to have new onset aflutter w/ RVR, Patient found to have human metapneumovirus with likely superimposed bacterial infection given elevated procal and CT findings of RLL and RML collapse

## 2023-03-07 NOTE — PROVIDER CONTACT NOTE (CRITICAL VALUE NOTIFICATION) - SITUATION
RN notified by lab of positive blood culture. growth in aerobic bottle, gram positive cocci in pairs

## 2023-03-07 NOTE — DISCHARGE NOTE PROVIDER - NSDCCPTREATMENT_GEN_ALL_CORE_FT
PRINCIPAL PROCEDURE  Procedure: CT chest wo con  Findings and Treatment:   < end of copied text >  IMPRESSION:  Chest: Improving aeration of previously impacted right lower lobe distal   airways with improving bibasilar atelectasis. Additionally, there is a   small clustered area of unchanged nodular opacities which may represent   superimposed infection or inflammation. Follow-up is recommendedin 12   months with noncontrast chest CT to ensure resolution.  < from: CT Chest No Cont (03.14.23 @ 14:29) >

## 2023-03-08 ENCOUNTER — APPOINTMENT (OUTPATIENT)
Dept: PODIATRY | Facility: CLINIC | Age: 73
End: 2023-03-08

## 2023-03-08 DIAGNOSIS — B34.8 OTHER VIRAL INFECTIONS OF UNSPECIFIED SITE: ICD-10-CM

## 2023-03-08 LAB
ANION GAP SERPL CALC-SCNC: 12 MMOL/L — SIGNIFICANT CHANGE UP (ref 5–17)
APTT BLD: 145.4 SEC — CRITICAL HIGH (ref 27.5–35.5)
APTT BLD: 74.2 SEC — HIGH (ref 27.5–35.5)
BUN SERPL-MCNC: 60 MG/DL — HIGH (ref 7–23)
CALCIUM SERPL-MCNC: 7.7 MG/DL — LOW (ref 8.4–10.5)
CHLORIDE SERPL-SCNC: 107 MMOL/L — SIGNIFICANT CHANGE UP (ref 96–108)
CO2 SERPL-SCNC: 19 MMOL/L — LOW (ref 22–31)
CREAT SERPL-MCNC: 2.29 MG/DL — HIGH (ref 0.5–1.3)
EGFR: 30 ML/MIN/1.73M2 — LOW
GLUCOSE BLDC GLUCOMTR-MCNC: 145 MG/DL — HIGH (ref 70–99)
GLUCOSE BLDC GLUCOMTR-MCNC: 173 MG/DL — HIGH (ref 70–99)
GLUCOSE BLDC GLUCOMTR-MCNC: 230 MG/DL — HIGH (ref 70–99)
GLUCOSE BLDC GLUCOMTR-MCNC: 275 MG/DL — HIGH (ref 70–99)
GLUCOSE BLDC GLUCOMTR-MCNC: 66 MG/DL — LOW (ref 70–99)
GLUCOSE SERPL-MCNC: 171 MG/DL — HIGH (ref 70–99)
GRAM STN FLD: SIGNIFICANT CHANGE UP
HCT VFR BLD CALC: 33.2 % — LOW (ref 39–50)
HCT VFR BLD CALC: 33.2 % — LOW (ref 39–50)
HGB BLD-MCNC: 10.6 G/DL — LOW (ref 13–17)
HGB BLD-MCNC: 10.6 G/DL — LOW (ref 13–17)
MAGNESIUM SERPL-MCNC: 2.3 MG/DL — SIGNIFICANT CHANGE UP (ref 1.6–2.6)
MCHC RBC-ENTMCNC: 31.9 GM/DL — LOW (ref 32–36)
MCHC RBC-ENTMCNC: 31.9 GM/DL — LOW (ref 32–36)
MCHC RBC-ENTMCNC: 32.2 PG — SIGNIFICANT CHANGE UP (ref 27–34)
MCHC RBC-ENTMCNC: 32.4 PG — SIGNIFICANT CHANGE UP (ref 27–34)
MCV RBC AUTO: 100.9 FL — HIGH (ref 80–100)
MCV RBC AUTO: 101.5 FL — HIGH (ref 80–100)
NRBC # BLD: 0 /100 WBCS — SIGNIFICANT CHANGE UP (ref 0–0)
NRBC # BLD: 0 /100 WBCS — SIGNIFICANT CHANGE UP (ref 0–0)
PHOSPHATE SERPL-MCNC: 2.6 MG/DL — SIGNIFICANT CHANGE UP (ref 2.5–4.5)
PLATELET # BLD AUTO: 189 K/UL — SIGNIFICANT CHANGE UP (ref 150–400)
PLATELET # BLD AUTO: 216 K/UL — SIGNIFICANT CHANGE UP (ref 150–400)
POTASSIUM SERPL-MCNC: 3.6 MMOL/L — SIGNIFICANT CHANGE UP (ref 3.5–5.3)
POTASSIUM SERPL-SCNC: 3.6 MMOL/L — SIGNIFICANT CHANGE UP (ref 3.5–5.3)
RBC # BLD: 3.27 M/UL — LOW (ref 4.2–5.8)
RBC # BLD: 3.29 M/UL — LOW (ref 4.2–5.8)
RBC # FLD: 15.4 % — HIGH (ref 10.3–14.5)
RBC # FLD: 15.5 % — HIGH (ref 10.3–14.5)
SODIUM SERPL-SCNC: 138 MMOL/L — SIGNIFICANT CHANGE UP (ref 135–145)
SPECIMEN SOURCE: SIGNIFICANT CHANGE UP
TACROLIMUS SERPL-MCNC: 5.3 NG/ML — SIGNIFICANT CHANGE UP
WBC # BLD: 9.68 K/UL — SIGNIFICANT CHANGE UP (ref 3.8–10.5)
WBC # BLD: 9.85 K/UL — SIGNIFICANT CHANGE UP (ref 3.8–10.5)
WBC # FLD AUTO: 9.68 K/UL — SIGNIFICANT CHANGE UP (ref 3.8–10.5)
WBC # FLD AUTO: 9.85 K/UL — SIGNIFICANT CHANGE UP (ref 3.8–10.5)

## 2023-03-08 PROCEDURE — 99233 SBSQ HOSP IP/OBS HIGH 50: CPT | Mod: GC

## 2023-03-08 PROCEDURE — 86077 PHYS BLOOD BANK SERV XMATCH: CPT

## 2023-03-08 PROCEDURE — 99232 SBSQ HOSP IP/OBS MODERATE 35: CPT

## 2023-03-08 RX ORDER — POLYETHYLENE GLYCOL 3350 17 G/17G
17 POWDER, FOR SOLUTION ORAL DAILY
Refills: 0 | Status: DISCONTINUED | OUTPATIENT
Start: 2023-03-08 | End: 2023-03-18

## 2023-03-08 RX ORDER — CEFEPIME 1 G/1
1000 INJECTION, POWDER, FOR SOLUTION INTRAMUSCULAR; INTRAVENOUS ONCE
Refills: 0 | Status: COMPLETED | OUTPATIENT
Start: 2023-03-08 | End: 2023-03-08

## 2023-03-08 RX ORDER — SENNA PLUS 8.6 MG/1
1 TABLET ORAL DAILY
Refills: 0 | Status: DISCONTINUED | OUTPATIENT
Start: 2023-03-08 | End: 2023-03-18

## 2023-03-08 RX ADMIN — TACROLIMUS 0.5 MILLIGRAM(S): 5 CAPSULE ORAL at 20:45

## 2023-03-08 RX ADMIN — Medication 500000 UNIT(S): at 17:45

## 2023-03-08 RX ADMIN — Medication 2: at 09:09

## 2023-03-08 RX ADMIN — HEPARIN SODIUM 0 UNIT(S)/HR: 5000 INJECTION INTRAVENOUS; SUBCUTANEOUS at 10:17

## 2023-03-08 RX ADMIN — ATORVASTATIN CALCIUM 10 MILLIGRAM(S): 80 TABLET, FILM COATED ORAL at 22:15

## 2023-03-08 RX ADMIN — Medication 500000 UNIT(S): at 08:15

## 2023-03-08 RX ADMIN — HEPARIN SODIUM 1300 UNIT(S)/HR: 5000 INJECTION INTRAVENOUS; SUBCUTANEOUS at 02:13

## 2023-03-08 RX ADMIN — Medication 125 MILLIGRAM(S): at 06:19

## 2023-03-08 RX ADMIN — Medication 6 UNIT(S): at 13:14

## 2023-03-08 RX ADMIN — Medication 1 TABLET(S): at 22:13

## 2023-03-08 RX ADMIN — Medication 125 MILLIGRAM(S): at 17:45

## 2023-03-08 RX ADMIN — Medication 1 MILLIGRAM(S): at 12:15

## 2023-03-08 RX ADMIN — Medication 81 MILLIGRAM(S): at 12:14

## 2023-03-08 RX ADMIN — HEPARIN SODIUM 1100 UNIT(S)/HR: 5000 INJECTION INTRAVENOUS; SUBCUTANEOUS at 12:13

## 2023-03-08 RX ADMIN — Medication 200 MILLIGRAM(S): at 00:18

## 2023-03-08 RX ADMIN — Medication 500000 UNIT(S): at 20:45

## 2023-03-08 RX ADMIN — Medication 60 MILLIGRAM(S): at 06:19

## 2023-03-08 RX ADMIN — PANTOPRAZOLE SODIUM 40 MILLIGRAM(S): 20 TABLET, DELAYED RELEASE ORAL at 06:18

## 2023-03-08 RX ADMIN — Medication 500000 UNIT(S): at 12:14

## 2023-03-08 RX ADMIN — INSULIN GLARGINE 20 UNIT(S): 100 INJECTION, SOLUTION SUBCUTANEOUS at 22:13

## 2023-03-08 RX ADMIN — TACROLIMUS 0.5 MILLIGRAM(S): 5 CAPSULE ORAL at 08:15

## 2023-03-08 RX ADMIN — Medication 2: at 22:15

## 2023-03-08 RX ADMIN — SODIUM CHLORIDE 4 MILLILITER(S): 9 INJECTION INTRAMUSCULAR; INTRAVENOUS; SUBCUTANEOUS at 06:18

## 2023-03-08 RX ADMIN — Medication 200 MILLIGRAM(S): at 06:31

## 2023-03-08 RX ADMIN — Medication 200 MILLIGRAM(S): at 06:18

## 2023-03-08 RX ADMIN — Medication 200 MILLIGRAM(S): at 12:14

## 2023-03-08 RX ADMIN — CEFEPIME 100 MILLIGRAM(S): 1 INJECTION, POWDER, FOR SOLUTION INTRAMUSCULAR; INTRAVENOUS at 22:00

## 2023-03-08 RX ADMIN — Medication 200 MILLIGRAM(S): at 17:46

## 2023-03-08 RX ADMIN — Medication 3 MILLILITER(S): at 00:18

## 2023-03-08 RX ADMIN — Medication 14 UNIT(S): at 09:09

## 2023-03-08 RX ADMIN — Medication 3 MILLILITER(S): at 06:18

## 2023-03-08 RX ADMIN — SIMETHICONE 80 MILLIGRAM(S): 80 TABLET, CHEWABLE ORAL at 12:15

## 2023-03-08 NOTE — PROGRESS NOTE ADULT - PROBLEM SELECTOR PLAN 7
s/p OHT in 2/23/18  - continue home Toprol  mg PO QD  - continue home ASA 81 and  pravastatin 20 mg PO QD  - continue home pantoprazole 40 mg PO QD    - HOLD losartan 100mg qd due to NORMAN, dilt gtt if HR >120s  - underwent annual RHC/EMBx with Dr. Carpenter in February 2023 Cr rising, Urine lytes consistent with pre-renal etiology   - Bladder scan negative for urinary retention   - monitor Cr, renally dose medications (patient does not meet criteria for redosing eliquis based on age and weight)  - holding losartan; can restart as needed for bp control  - s/p 1 L bolus; c/w maintenance fluids

## 2023-03-08 NOTE — PROGRESS NOTE ADULT - PROBLEM SELECTOR PLAN 1
P/w chest tightness x1d associated w/ dry cough; likely multifactorial in s/o atrial flutter w/ HR 140s, notes that his chest tightness is improving as HR is lowered. Also considering contribution from CT chest findings of impacted R middle and lower lobe bronchi w/ near collapse of R middle/lower lobes.   - s/p Vanc/Zosyn in ED, 1L IVF  - f/u Bcx  - Procal elevated to 2.39; neutrophilic predominance on diff with 1% bands in immunosuppresed   - continue abx: cefepime 1g q24h (3/4 - ) --> adjusted for NORMAN  -EBV negative for active infection, MRSA negative  - f/u urine legionella, CMV, cryptococcal antigen, fungitell  - treatment of aflutter as below  - RVP positive for human metapneumovirus   -Pulm consulted for possible bronch; f/u recs (aggressive Airway clearance since patient not hypoxic)  - Transplant ID following; f/u recs  - c/w ppx of bactrim and valcyclovir P/w chest tightness x1d associated w/ dry cough; likely multifactorial in s/o atrial flutter w/ HR 140s, notes that his chest tightness is improving as HR is lowered. Also considering contribution from CT chest findings of impacted R middle and lower lobe bronchi w/ near collapse of R middle/lower lobes.   - RVP positive for human metapneumovirus   - Procal elevated to 2.39; neutrophilic predominance on diff with 1% bands in immunosuppresed   - continue abx: cefepime 1g q24h (3/4 - 2/8 ) --> adjusted for NORMAN  -EBV negative for active infection, MRSA negative  - fungitell > 300   -BCx positive for likley contaminant; f/u repeat   - treatment of aflutter as below    -Pulm consulted for possible bronch; f/u recs (aggressive Airway clearance since patient not hypoxic)  - Transplant ID following; f/u recs  - c/w ppx of bactrim and valcyclovir

## 2023-03-08 NOTE — PROGRESS NOTE ADULT - PROBLEM SELECTOR PLAN 11
Diet: DASH, Carb consistent  DVT: Eliquis 5mg BID  Dispo: Home with Home PT   Code Status: Full Code     Patient ask that we do not discuss this case with his family at this time. On febuxostat 40mg qd, followed by rheumatology

## 2023-03-08 NOTE — PROGRESS NOTE ADULT - ASSESSMENT
71 y/o male with hx of nonischemic cardiomyopathy s/p HM2 LVAD, underwent OHT 2/23/18 with hepatitis C donor, hx of hemolytic anemia presented w/ 1d chest tightness, found to have new onset aflutter w/ RVR, Patient found to have human metapneumovirus with likely superimposed bacterial infection given elevated procal and CT findings of RLL and RML collapse 73 y/o male with hx of nonischemic cardiomyopathy s/p HM2 LVAD, underwent OHT 2/23/18 with hepatitis C donor, hx of hemolytic anemia presented w/ 1d chest tightness, found to have new onset aflutter w/ RVR, Patient found to have human metapneumovirus with likely superimposed bacterial infection given elevated procal and CT findings of RLL and RML collapse. Course c/b elevated fungitell. Pending endomyocardial biopsy to rule out rejection.

## 2023-03-08 NOTE — PROGRESS NOTE ADULT - PROBLEM SELECTOR PLAN 6
Cr rising, Urine lytes consistent with pre-renal etiology   - Bladder scan negative for urinary retention   - monitor Cr, renally dose medications (patient does not meet criteria for redosing eliquis based on age and weight)  - holding losartan; can restart as needed for bp control  - s/p 1 L bolus; c/w maintenance fluids Prednisone-induced hyperglycemia w/ blood glucose elevated to 400-500 on admission. Based on outpatient records, patient has been hyperglycemic in the past, last A1c 4.7; not currently on any JAVIER or insulin. Not given any insulin in ED.   - BHB wnl, anion gap wnl  - given 5u regular insulin;  at the time  - increase standing insulin based on 24h requirements  - started ISS, hypoglycemia protocol  - endocrine consulted for hyperglycemia  - started on lantus 18 units and admelog 7 units TID per endocrine however had episodes of hypoglycemia --> transitioned to admelog 10 - 6 - 4

## 2023-03-08 NOTE — PROGRESS NOTE ADULT - PROBLEM SELECTOR PLAN 1
Plan: -test BG AC/HS  -C/w Lantus 20 units QHS for now  -c/w Admelog 14-6-4 w/meals. HOLD IF NOT EATING   -c/w Admelog moderate correction scale AC and mod HS scale for now  -Needs RD consult  -On prednisone 60mg PO daily. Notify endocrine team if this is changed.   - Please teach and allow pt to do own finger sticks and insulin injections under RN supervision in case pt  is discharged home on high dose steroids requiring insulin therapy.   Please teach use of insulin pen  Please document teach back  Discharge plan:  -Will be determined according to BG/insulin needs/PO intake and steroid therapy at time of discharge.  -Make sure pt has Rx for all DM supplies and insulin/ DM meds.  - Home care due to new DM diagnosis and likely need of insulin therapy  - Patient to call doctor with persistent high or low BG at home.   - Recommend routine outpatient ophthalmology, podiatry   - Will need endocrinology f/u if pt send home on insulin/high dose steroids Can follow at [facility]. [address] [place] [address] [address]. Phone . Call for apt closer to discharge.

## 2023-03-08 NOTE — PROGRESS NOTE ADULT - PROBLEM SELECTOR PLAN 9
Followed by [facility] as outpatient; admitted 6.5 in Jan 2023, treated w/ 4d IV dexamethasone.   Per hematology, recommended to avoid blood transfusion unless patient is symptomatic due to risk of hyperhemolysis. Transfusion is okay from heart transplant perspective however per blood bank It will need to be emergent.   - maintain active T/S  - Hematology consulted for help with steroid management in setting of acute infection; f/u recs  - labs not concerning for hemolysis Continue home tacrolimus 5mg qd; target level 6-8  - f/u tacro level  Continue prednisone 75mg qd  Prophylaxis:   - Bactrim every other day   - Valganciclovir 450mg two times weekly   - Pantoprazole 40mg qd  - vancomycin oral   Off cellcept due to leukopenia, recurrent infections

## 2023-03-08 NOTE — PROGRESS NOTE ADULT - PROBLEM SELECTOR PLAN 3
Home regimen: pravastatin 20mg daily  - LDL goal <70 . Pt LDL 51  - Continue atorvastatin 10mg daily while in patient  - Restart home statin upon discharge if not contraindicated.    Can be reached via TEAMS/pager: [phone]   office:  [phone] (M-F 9a-5pm)               [phone] (nights/weekends)   Can access Amion coverage via sunrise/tools

## 2023-03-08 NOTE — PROGRESS NOTE ADULT - SUBJECTIVE AND OBJECTIVE BOX
Follow Up: Pneumonia     Interval History/ROS: Seen and examined at bedside.     REVIEW OF SYSTEMS  Unchanged from prior  Continues to endorse cough    Allergies  No Known Allergies    ANTIMICROBIALS:    cefepime   IVPB 1000 every 24 hours  nystatin    Suspension 061735 <User Schedule>  trimethoprim   80 mG/sulfamethoxazole 400 mG 1 every 48 hours  valGANciclovir 450 <User Schedule>  vancomycin    Solution 125 every 12 hours    OTHER MEDS: MEDICATIONS  (STANDING):  acetaminophen     Tablet .. 650 every 6 hours PRN  albuterol/ipratropium for Nebulization 3 every 6 hours  aspirin enteric coated 81 daily  atorvastatin 10 at bedtime  dextrose 50% Injectable 25 once  dextrose 50% Injectable 12.5 once  dextrose 50% Injectable 25 once  dextrose Oral Gel 15 once  glucagon  Injectable 1 once  guaiFENesin Oral Liquid (Sugar-Free) 200 every 6 hours  heparin  Infusion.  <Continuous>  insulin glargine Injectable (LANTUS) 20 at bedtime  insulin lispro (ADMELOG) corrective regimen sliding scale  <User Schedule>  insulin lispro (ADMELOG) corrective regimen sliding scale  three times a day before meals  insulin lispro Injectable (ADMELOG) 4 before dinner  insulin lispro Injectable (ADMELOG) 6 before lunch  insulin lispro Injectable (ADMELOG) 14 before breakfast  melatonin 3 at bedtime PRN  metoprolol succinate  daily  pantoprazole    Tablet 40 before breakfast  polyethylene glycol 3350 17 daily  predniSONE   Tablet 60 daily  senna 1 daily  simethicone 80 daily  sodium chloride 3%  Inhalation 4 every 12 hours  tacrolimus 0.5 <User Schedule>    Vital Signs Last 24 Hrs  T(F): 97.4 (23 @ 05:29), Max: 100.1 (23 @ 17:46)    Vital Signs Last 24 Hrs  HR: 102 (23 @ 05:29) (102 - 110)  BP: 136/93 (23 @ 05:29) (114/70 - 136/93)  RR: 18 (23 @ 05:29)  SpO2: 99% (23 @ 05:29) (97% - 99%)  Wt(kg): --    EXAM:  General: Patient in NAD   HEENT: NCAT, EOMI  CV: S1+S2  Surgical scars present on chest wall   Lungs: No respiratory distress, CTAB, on room air   Abd: Soft, nontender, no guarding  Ext: No cyanosis  Neuro: Calm   Skin: No rash   IV: No phlebitis    Labs:                        10.6   9.85  )-----------( 216      ( 08 Mar 2023 08:41 )             33.2         138  |  107  |  60<H>  ----------------------------<  171<H>  3.6   |  19<L>  |  2.29<H>    Ca    7.7<L>      08 Mar 2023 08:41  Phos  2.6       Mg     2.3         WBC Trend:  WBC Count: 9.85 (23 @ 08:41)  WBC Count: 9.68 (23 @ 00:40)  WBC Count: 9.48 (23 @ 06:52)  WBC Count: 9.30 (23 @ 09:07)    Creatine Trend:  Creatinine, Serum: 2.29 ()  Creatinine, Serum: 2.71 ()  Creatinine, Serum: 3.11 ()  Creatinine, Serum: 2.85 ()    Liver Biochemical Testing Trend:  Alanine Aminotransferase (ALT/SGPT): 23 ()  Alanine Aminotransferase (ALT/SGPT): 24 ()  Alanine Aminotransferase (ALT/SGPT): 30 ()  Alanine Aminotransferase (ALT/SGPT): 17 ()  Alanine Aminotransferase (ALT/SGPT): 12 ()  Aspartate Aminotransferase (AST/SGOT): 25 (23 @ 06:45)  Aspartate Aminotransferase (AST/SGOT): 32 (23 @ 00:46)  Aspartate Aminotransferase (AST/SGOT): 23 (23 @ 15:09)  Aspartate Aminotransferase (AST/SGOT): 24 (23 @ 09:57)  Aspartate Aminotransferase (AST/SGOT): 28 (23 @ 07:21)  Bilirubin Direct, Serum: 0.2 ()  Bilirubin Total, Serum: 0.7 ()  Bilirubin Total, Serum: 1.3 ()  Bilirubin Total, Serum: 1.7 ()  Bilirubin Total, Serum: 2.4 ()    Trend LDH  23 @ 06:45  418<H>  23 @ 13:55  350<H>  23 @ 07:17  863<H>  23 @ 08:52  854<H>  01-10-23 @ 08:21  717<H>    Urinalysis Basic - ( 06 Mar 2023 17:49 )  Color: Yellow / Appearance: Slightly Turbid / S.022 / pH: x  Gluc: x / Ketone: Trace  / Bili: Negative / Urobili: Negative   Blood: x / Protein: 30 mg/dL / Nitrite: Negative   Leuk Esterase: Negative / RBC: 1 /hpf / WBC 1 /HPF   Sq Epi: x / Non Sq Epi: 2 /hpf / Bacteria: Negative    MICROBIOLOGY:    MRSA PCR Result.: NotDetec (23 @ 06:32)    Culture - Sputum (collected 07 Mar 2023 17:45)  Source: .Sputum Sputum    Culture - Fungal, Sputum (collected 07 Mar 2023 11:29)  Source: .Sputum Sputum  Preliminary Report:    Testing in progress    Culture - Acid Fast - Sputum w/Smear (collected 07 Mar 2023 11:29)  Source: .Sputum Sputum    Culture - Blood (collected 03 Mar 2023 19:40)  Source: .Blood Blood-Peripheral  Final Report:    Growth in aerobic bottle: Micrococcus luteus    "Susceptibilities not performed"    ***Blood Panel PCR results on this specimen are available    approximately 3 hours after the Gram stain result.***    Gram stain, PCR, and/or culture results may not always    correspond due to difference in methodologies.    ************************************************************    This PCR assay was performed by multiplex PCR. This    Assay tests for 66 bacterial and resistance gene targets.    Please refer to the [facility] test directory    at [email or web address] for details.  Organism: Blood Culture PCR  Organism: Blood Culture PCR    Sensitivities:      -  Blood PCR Panel: NEG      Method Type: PCR    Culture - Blood (collected 03 Mar 2023 19:30)  Source: .Blood Blood-Peripheral  Preliminary Report:    No growth to date.    ABS CD4: 120 /uL (18 @ 13:00)    HIV-1 RNA Quantitative, Viral Load:   NOT DET. (18 @ 19:10)  HIV-1 Viral Load Result: NOT DET. (18 @ 19:10)    Cryptococcal Antigen - Serum: Negative (23 @ 22:39)  Cytomegalovirus By PCR:   NotDetec (23 @ 20:03)    Fungitell: 300 pg/mL ( @ 09:42)  Rapid RVP Result: Detected ( @ 06:33)  Cryptococcal Antigen - Serum: Negative ( @ 22:39)  Legionella Antigen, Urine: Negative ( @ 22:35)    Procalcitonin, Serum: 2.39 (-04)    D-Dimer Assay, Quantitative: 1269 (-05)    Lactate Dehydrogenase, Serum: 418 (-)  Lactate Dehydrogenase, Serum: 350 (-04)    Serum Pro-Brain Natriuretic Peptide: 3349 (-)    Troponin T, High Sensitivity Result: 63 (-)  Troponin T, High Sensitivity Result: 60 (-)  Troponin T, High Sensitivity Result: 80 (-03)    RADIOLOGY:  imaging below personally reviewed    < from: CT Chest No Cont (23 @ 21:21) >  IMPRESSION:  Impacted right middle and lower lobe bronchi with near complete collapse   of the right middle and lower lobes. Limited evaluation for underlying   infection.  Patchy areas of consolidation within the right upper lobe and left lower   lobe likely represents additional areasof atelectasis versus infection.    No acute intra-abdominal abnormality. Nonspecific left perinephric   stranding, slightly increased from prior.    --- End of Report ---    < end of copied text >

## 2023-03-08 NOTE — PROGRESS NOTE ADULT - ATTENDING COMMENTS
above plans discussed with Dr. [place]    # hMPV PNA  # r/o superimposed bacterial/fungal infection  # NORMAN  # Aflutter with RVR  # AIHA on high dose steroids  # steroid induced hyperglycemia  # s/p heart transplant  # hx of C.diff colitis    - Pt initially presented chest tightness found to have AFlutter at rate 140bpm but self converted to sinus rhythm and now rate controlled  - given elevated [id number] score to 2, started on systemic AC (eliquis); fu with EP as outpatient for further management  - CT chest concerning for RML/RLL consolidation, started on IV vancomycin/cefepime for empiric coverage especially given his immunocompromised status but vanco dc'ed as MRSA PCR negative  - complete 5 day course of cefepime  - Bcx 1/4 with GPC, fu speciation and repeat BCx - suspect contamination  - new onset NORMAN on admission; no signs of urinary retention (bladder scan negative), likely pre-renal in setting of active infection vs. ATN from vancomycin; send UA with microscopic exam as well as renal US; overall, SCr improving continuously  - likely pre-renal in setting of active infection  - prednisone dose reduced to 60mg daily  - FS now better controlled on current regimen  - tacro level at goal   - appreciate transplant cards: plan for EMBx today  - pt is currently stable on RA, improvement in symptoms - hold off on pulm consult for bronchoscopy at this time  - appreciate interdisciplinary team recommendations from transplant cards, ID, ethan Trevizo MD  Division of Hospital Medicine  Contact via [employer] Teams  Office: [phone]

## 2023-03-08 NOTE — PROGRESS NOTE ADULT - PROBLEM SELECTOR PLAN 4
Patient AOx1 on admission, AOx3 by time of interview though slow to respond to questions, falling asleep intermittently during exam. Considering infection-mediated vs 2/2 hyperglycemia.  RESOLVED  - CTH negative for acute pathology  - glycemic control as below; monitor for signs of DKA Meeting sepsis criteria w/ tachycardia to 140s, RR 24, oral temp 100.1 --> likely higher rectal temp, w/ PNA as possible source given symptoms, CT chest findings.   - treatment of PNA as above: vanc and cefepime  - f/u transplant ID recs  - Steroids decreased to 60 mg per ID and Heme Onc reccomendation  - Prelim BCX positive for gram positive cocci in pairs, f/u final cultures

## 2023-03-08 NOTE — PROGRESS NOTE ADULT - ASSESSMENT
71 year old M with a PMH of HTN, NICM c/b HFrEF s/p LVAD 2017, and then subsequently s/p heart transplant from HCV donor (treated) in 2018 with post-op course complicated by graft dysfunction, who initially presented to the ED with cough and chest tightness.     Cough  Afebrile, no leukocytosis  RVP + for hMPV   CT with impacted RML and RLL bronchi, and patchy consolidations in RUL and LLL  Initially on Vancomycin and Cefepime   Now on Cefepime only (MRSA PCR negative)  Legionella Ur Ag negative, serum Cryptococcal Ag negative, EBV IgM negative  UA negative  Blood cultures from March 3rd with 1/4 bottles with Micrococcus Luteus   Fungitell 300  Procalcitonin 2.39, Cr 2->3 (NORMAN)  On Valcyte and Bactrim prophylaxis     OVERALL  hMPV pneumonia in heart transplant/immunocompromised recipient   R/o superimposed bacterial infection   Elevated Fungitell   1/4 BCx bottles with Micrococcus   NORMAN, s/p heart transplant 2018  H/o HCV from donor s/p treatment   H/o C Diff in 2020     RECOMMENDATIONS   -Elevated Fungitell - follow up on sputum fungal culture  -Galactomannan negative, Histoplasma Ur Ag negative   -Pulm recommendations noted  -Blood culture 1/4 bottles with Micrococcus Luteus - suspect procurement contaminant  -Continue Cefepime, total 5 day course through today 3/8   -Bactrim/Valcyte prophylaxis, renally dose   -F/u AFB, fungal and routine sputum cultures   -F/u sputum PCP PCR (although pt was on prophylaxis with Bactrim which makes PCP PNA significantly less likely)  -F/u Parvovirus PCR serum  -CMV PCR negative, BK PCR negative     All recommendations are tentative pending Attending Attestation.    Estuardo Diop MD, PGY-4   ID Fellow  [employer] Teams Preferred  After 5pm/weekends call [phone]

## 2023-03-08 NOTE — PROGRESS NOTE ADULT - PROBLEM SELECTOR PLAN 10
On febuxostat 40mg qd, followed by rheumatology Followed by jarred as outpatient; admitted 6.5 in Jan 2023, treated w/ 4d IV dexamethasone.   Per hematology, recommended to avoid blood transfusion unless patient is symptomatic due to risk of hyperhemolysis. Transfusion is okay from heart transplant perspective however per blood bank It will need to be emergent.   - maintain active T/S  - Hematology consulted for help with steroid management in setting of acute infection; f/u recs  - labs not concerning for hemolysis

## 2023-03-08 NOTE — PROGRESS NOTE ADULT - SUBJECTIVE AND OBJECTIVE BOX
INTERVAL HPI/OVERNIGHT EVENTS:    SUBJECTIVE: Patient seen and examined at bedside.    OBJECTIVE:    VITAL SIGNS:  ICU Vital Signs Last 24 Hrs  T(C): 36.3 (08 Mar 2023 05:29), Max: 36.6 (07 Mar 2023 12:03)  T(F): 97.4 (08 Mar 2023 05:29), Max: 97.8 (07 Mar 2023 12:03)  HR: 102 (08 Mar 2023 05:) (102 - 110)  BP: 136/93 (08 Mar 2023 05:) (114/70 - 136/93)  BP(mean): --  ABP: --  ABP(mean): --  RR: 18 (08 Mar 2023 05:) (18 - 18)  SpO2: 99% (08 Mar 2023 05:) (97% - 99%)    O2 Parameters below as of 08 Mar 2023 05:29  Patient On (Oxygen Delivery Method): room air              CAPILLARY BLOOD GLUCOSE      POCT Blood Glucose.: 230 mg/dL (08 Mar 2023 02:05)      PHYSICAL EXAM:    General: NAD  HEENT: NC/AT; PERRL, clear conjunctiva  Neck: supple  Respiratory: CTA b/l  Cardiovascular: +S1/S2; RRR  Abdomen: soft, NT/ND; +BS x4  Extremities:  no LE edema  Vascular: WWP, 2+ peripheral pulses b/l;  Skin: normal color and turgor; no rash  Neurological: A&Ox3, move all extremities. CN II-XII intact    MEDICATIONS:  MEDICATIONS  (STANDING):  albuterol/ipratropium for Nebulization 3 milliLiter(s) Nebulizer every 6 hours  aspirin enteric coated 81 milliGRAM(s) Oral daily  atorvastatin 10 milliGRAM(s) Oral at bedtime  cefepime   IVPB 1000 milliGRAM(s) IV Intermittent every 24 hours  dextrose 5%. 1000 milliLiter(s) (50 mL/Hr) IV Continuous <Continuous>  dextrose 5%. 1000 milliLiter(s) (100 mL/Hr) IV Continuous <Continuous>  dextrose 50% Injectable 25 Gram(s) IV Push once  dextrose 50% Injectable 12.5 Gram(s) IV Push once  dextrose 50% Injectable 25 Gram(s) IV Push once  dextrose Oral Gel 15 Gram(s) Oral once  folic acid 1 milliGRAM(s) Oral daily  glucagon  Injectable 1 milliGRAM(s) IntraMuscular once  guaiFENesin Oral Liquid (Sugar-Free) 200 milliGRAM(s) Oral every 6 hours  heparin  Infusion.  Unit(s)/Hr (13 mL/Hr) IV Continuous <Continuous>  insulin glargine Injectable (LANTUS) 20 Unit(s) SubCutaneous at bedtime  insulin lispro (ADMELOG) corrective regimen sliding scale   SubCutaneous three times a day before meals  insulin lispro (ADMELOG) corrective regimen sliding scale   SubCutaneous <User Schedule>  insulin lispro Injectable (ADMELOG) 4 Unit(s) SubCutaneous before dinner  insulin lispro Injectable (ADMELOG) 6 Unit(s) SubCutaneous before lunch  insulin lispro Injectable (ADMELOG) 14 Unit(s) SubCutaneous before breakfast  lactated ringers. 1000 milliLiter(s) (50 mL/Hr) IV Continuous <Continuous>  metoprolol succinate  milliGRAM(s) Oral daily  nystatin    Suspension 077044 Unit(s) Oral <User Schedule>  pantoprazole    Tablet 40 milliGRAM(s) Oral before breakfast  predniSONE   Tablet 60 milliGRAM(s) Oral daily  simethicone 80 milliGRAM(s) Chew daily  sodium chloride 3%  Inhalation 4 milliLiter(s) Inhalation every 12 hours  tacrolimus 0.5 milliGRAM(s) Oral <User Schedule>  trimethoprim   80 mG/sulfamethoxazole 400 mG 1 Tablet(s) Oral every 48 hours  valGANciclovir 450 milliGRAM(s) Oral <User Schedule>  vancomycin    Solution 125 milliGRAM(s) Oral every 12 hours    MEDICATIONS  (PRN):  acetaminophen     Tablet .. 650 milliGRAM(s) Oral every 6 hours PRN Temp greater or equal to 38C (100.4F), Mild Pain (1 - 3)  melatonin 3 milliGRAM(s) Oral at bedtime PRN Insomnia      ALLERGIES:  Allergies    No Known Allergies    Intolerances        LABS:                        10.6   9.68  )-----------( 189      ( 08 Mar 2023 00:40 )             33.2     03-07    135  |  105  |  76<H>  ----------------------------<  193<H>  4.0   |  18<L>  |  2.71<H>    Ca    7.9<L>      07 Mar 2023 06:52  Phos  4.0     03-07  Mg     2.5     03-07      PT/INR - ( 07 Mar 2023 17:39 )   PT: 13.6 sec;   INR: 1.17 ratio         PTT - ( 08 Mar 2023 00:40 )  PTT:74.2 sec  Urinalysis Basic - ( 06 Mar 2023 17:49 )    Color: Yellow / Appearance: Slightly Turbid / S.022 / pH: x  Gluc: x / Ketone: Trace  / Bili: Negative / Urobili: Negative   Blood: x / Protein: 30 mg/dL / Nitrite: Negative   Leuk Esterase: Negative / RBC: 1 /hpf / WBC 1 /HPF   Sq Epi: x / Non Sq Epi: 2 /hpf / Bacteria: Negative        RADIOLOGY & ADDITIONAL TESTS: Reviewed. INTERVAL HPI/OVERNIGHT EVENTS: KEVIN OVN     SUBJECTIVE: Patient seen and examined at bedside. This AM patient complaining of abdominal distention. Reported small liquid bowel movements. Otherwise denies palpitations, chest pain at this time.     OBJECTIVE:    VITAL SIGNS:  ICU Vital Signs Last 24 Hrs  T(C): 36.3 (08 Mar 2023 05:29), Max: 36.6 (07 Mar 2023 12:03)  T(F): 97.4 (08 Mar 2023 05:29), Max: 97.8 (07 Mar 2023 12:03)  HR: 102 (08 Mar 2023 05:29) (102 - 110)  BP: 136/93 (08 Mar 2023 05:29) (114/70 - 136/93)  BP(mean): --  ABP: --  ABP(mean): --  RR: 18 (08 Mar 2023 05:29) (18 - 18)  SpO2: 99% (08 Mar 2023 05:29) (97% - 99%)    O2 Parameters below as of 08 Mar 2023 05:29  Patient On (Oxygen Delivery Method): room air              CAPILLARY BLOOD GLUCOSE      POCT Blood Glucose.: 230 mg/dL (08 Mar 2023 02:05)      PHYSICAL EXAM:    General: NAD  HEENT: NC/AT; PERRL, clear conjunctiva  Neck: supple  Respiratory: CTA b/l  Cardiovascular: +S1/S2; RRR  Abdomen: soft, NT/mildly distended; +BS x4  Extremities:  no LE edema  Vascular: WWP, 2+ peripheral pulses b/l;  Skin: normal color and turgor; no rash  Neurological: A&Ox3, move all extremities. CN II-XII intact    MEDICATIONS:  MEDICATIONS  (STANDING):  albuterol/ipratropium for Nebulization 3 milliLiter(s) Nebulizer every 6 hours  aspirin enteric coated 81 milliGRAM(s) Oral daily  atorvastatin 10 milliGRAM(s) Oral at bedtime  cefepime   IVPB 1000 milliGRAM(s) IV Intermittent every 24 hours  dextrose 5%. 1000 milliLiter(s) (50 mL/Hr) IV Continuous <Continuous>  dextrose 5%. 1000 milliLiter(s) (100 mL/Hr) IV Continuous <Continuous>  dextrose 50% Injectable 25 Gram(s) IV Push once  dextrose 50% Injectable 12.5 Gram(s) IV Push once  dextrose 50% Injectable 25 Gram(s) IV Push once  dextrose Oral Gel 15 Gram(s) Oral once  folic acid 1 milliGRAM(s) Oral daily  glucagon  Injectable 1 milliGRAM(s) IntraMuscular once  guaiFENesin Oral Liquid (Sugar-Free) 200 milliGRAM(s) Oral every 6 hours  heparin  Infusion.  Unit(s)/Hr (13 mL/Hr) IV Continuous <Continuous>  insulin glargine Injectable (LANTUS) 20 Unit(s) SubCutaneous at bedtime  insulin lispro (ADMELOG) corrective regimen sliding scale   SubCutaneous three times a day before meals  insulin lispro (ADMELOG) corrective regimen sliding scale   SubCutaneous <User Schedule>  insulin lispro Injectable (ADMELOG) 4 Unit(s) SubCutaneous before dinner  insulin lispro Injectable (ADMELOG) 6 Unit(s) SubCutaneous before lunch  insulin lispro Injectable (ADMELOG) 14 Unit(s) SubCutaneous before breakfast  lactated ringers. 1000 milliLiter(s) (50 mL/Hr) IV Continuous <Continuous>  metoprolol succinate  milliGRAM(s) Oral daily  nystatin    Suspension [id number] Unit(s) Oral <User Schedule>  pantoprazole    Tablet 40 milliGRAM(s) Oral before breakfast  predniSONE   Tablet 60 milliGRAM(s) Oral daily  simethicone 80 milliGRAM(s) Chew daily  sodium chloride 3%  Inhalation 4 milliLiter(s) Inhalation every 12 hours  tacrolimus 0.5 milliGRAM(s) Oral <User Schedule>  trimethoprim   80 mG/sulfamethoxazole 400 mG 1 Tablet(s) Oral every 48 hours  valGANciclovir 450 milliGRAM(s) Oral <User Schedule>  vancomycin    Solution 125 milliGRAM(s) Oral every 12 hours    MEDICATIONS  (PRN):  acetaminophen     Tablet .. 650 milliGRAM(s) Oral every 6 hours PRN Temp greater or equal to 38C (100.4F), Mild Pain (1 - 3)  melatonin 3 milliGRAM(s) Oral at bedtime PRN Insomnia      ALLERGIES:  Allergies    No Known Allergies    Intolerances        LABS:                        10.6   9.68  )-----------( 189      ( 08 Mar 2023 00:40 )             33.2     03-07    135  |  105  |  76<H>  ----------------------------<  193<H>  4.0   |  18<L>  |  2.71<H>    Ca    7.9<L>      07 Mar 2023 06:52  Phos  4.0     03-07  Mg     2.5     03-07      PT/INR - ( 07 Mar 2023 17:39 )   PT: 13.6 sec;   INR: 1.17 ratio         PTT - ( 08 Mar 2023 00:40 )  PTT:74.2 sec  Urinalysis Basic - ( 06 Mar 2023 17:49 )    Color: Yellow / Appearance: Slightly Turbid / S.022 / pH: x  Gluc: x / Ketone: Trace  / Bili: Negative / Urobili: Negative   Blood: x / Protein: 30 mg/dL / Nitrite: Negative   Leuk Esterase: Negative / RBC: 1 /hpf / WBC 1 /HPF   Sq Epi: x / Non Sq Epi: 2 /hpf / Bacteria: Negative        RADIOLOGY & ADDITIONAL TESTS: Reviewed.

## 2023-03-08 NOTE — PROGRESS NOTE ADULT - PROBLEM SELECTOR PLAN 8
Continue home tacrolimus 5mg qd; target level 6-8  - f/u tacro level  Continue prednisone 75mg qd  Prophylaxis:   - Bactrim every other day   - Valganciclovir 450mg two times weekly   - Pantoprazole 40mg qd  - vancomycin oral   Off cellcept due to leukopenia, recurrent infections s/p OHT in 2/23/18  - continue home Toprol  mg PO QD  - continue home ASA 81 and  pravastatin 20 mg PO QD  - continue home pantoprazole 40 mg PO QD    - HOLD losartan 100mg qd due to NORMAN, dilt gtt if HR >120s  - underwent annual RHC/EMBx with Dr. Carpenter in February 2023

## 2023-03-08 NOTE — PROGRESS NOTE ADULT - PROBLEM SELECTOR PLAN 2
Admitted w/ HR 140s, improved after giving home dose 200mg metoprolol succinate. EP consulted and determined that patient has new-onset A-flutter. TTE done, showed EF 50-55%, concentric LV remodeling, grossly normal LV and RV systolic function. Possible compensatory response 2/2 underlying infection as above.   Patient now in sinus tachycadia  - EP recs appreciated; no need for cardioversion at this time  - continue home metoprolol succinate 200mg qd  - CHADSVASC 2; start anticoagulation w/ eliquis 5mg BID  - can trial lopressor push if HR >120s, then trial diltiazem push and transition to dilt drip Admitted w/ HR 140s, improved after giving home dose 200mg metoprolol succinate. EP consulted and determined that patient has new-onset A-flutter. TTE done, showed EF 50-55%, concentric LV remodeling, grossly normal LV and RV systolic function. Possible compensatory response 2/2 underlying infection as above.   Patient now in sinus tachycadia  - EP recs appreciated; no need for cardioversion at this time  - continue home metoprolol succinate 200mg qd; with resolution of infection if persistently tachycardic, consider addition of CCB   - CHADSVASC 2; start anticoagulation w/ eliquis 5mg BID --> transitioned to heparin gtt for biopsy   - can trial lopressor push if HR >120s, then trial diltiazem push and transition to dilt drip  - Pending endomyocardial biopsy on 3/8 to rule out rejection

## 2023-03-08 NOTE — PROGRESS NOTE ADULT - PROBLEM SELECTOR PLAN 5
Prednisone-induced hyperglycemia w/ blood glucose elevated to 400-500 on admission. Based on outpatient records, patient has been hyperglycemic in the past, last A1c 4.7; not currently on any JAVIER or insulin. Not given any insulin in ED.   - BHB wnl, anion gap wnl  - given 5u regular insulin;  at the time  - increase standing insulin based on 24h requirements  - started ISS, hypoglycemia protocol  - endocrine consulted for hyperglycemia  - started on lantus 18 units and admelog 7 units TID per endocrine however had episodes of hypoglycemia --> transitioned to admelog 10 - 6 - 4 Patient AOx1 on admission, AOx3 by time of interview though slow to respond to questions, falling asleep intermittently during exam. Considering infection-mediated vs 2/2 hyperglycemia.  RESOLVED  - CTH negative for acute pathology  - glycemic control as below; monitor for signs of DKA

## 2023-03-08 NOTE — PROGRESS NOTE ADULT - SUBJECTIVE AND OBJECTIVE BOX
November 30, 2022       Patient: Amarilis Gillespie   YOB: 1998   Date of Visit: 11/30/2022         To Whom It May Concern:    In my medical opinion, I recommend that Amarilis Gillespie be excused from work absence today as she was seen in clinic.    If you have any questions or concerns, please don't hesitate to call [phone]          Sincerely,          ALTAGRACIA Thapa.  Electronically Signed      Diabetes Follow up note:    Chief complaint: Steroid induced hyperglycemia    Interval Hx: BG values 140-270s over past 24 hours. Pt seen at bedside. Reports tolerating POs. Was on insulin in past when on steroids but unable to provide any details.     Review of Systems:  General: denies pain  GI: Tolerating POs. Denies N/V/D/Abd pain  CV: Denies CP/SOB  ENDO: No S&Sx of hypoglycemia    MEDS:  atorvastatin 10 milliGRAM(s) Oral at bedtime  insulin glargine Injectable (LANTUS) 20 Unit(s) SubCutaneous at bedtime  insulin lispro (ADMELOG) corrective regimen sliding scale   SubCutaneous <User Schedule>  insulin lispro (ADMELOG) corrective regimen sliding scale   SubCutaneous three times a day before meals  insulin lispro Injectable (ADMELOG) 4 Unit(s) SubCutaneous before dinner  insulin lispro Injectable (ADMELOG) 6 Unit(s) SubCutaneous before lunch  insulin lispro Injectable (ADMELOG) 14 Unit(s) SubCutaneous before breakfast  predniSONE   Tablet 60 milliGRAM(s) Oral daily    cefepime   IVPB 1000 milliGRAM(s) IV Intermittent every 24 hours  nystatin    Suspension 769833 Unit(s) Oral <User Schedule>  trimethoprim   80 mG/sulfamethoxazole 400 mG 1 Tablet(s) Oral every 48 hours  valGANciclovir 450 milliGRAM(s) Oral <User Schedule>  vancomycin    Solution 125 milliGRAM(s) Oral every 12 hours    Allergies    No Known Allergies        PE:  General: Male lying in bed. NAD.   Vital Signs Last 24 Hrs  T(C): 36.7 (08 Mar 2023 11:43), Max: 36.7 (08 Mar 2023 11:43)  T(F): 98 (08 Mar 2023 11:43), Max: 98 (08 Mar 2023 11:43)  HR: 99 (08 Mar 2023 11:43) (99 - 110)  BP: 110/75 (08 Mar 2023 11:43) (110/75 - 136/93)  BP(mean): 87 (08 Mar 2023 11:43) (87 - 87)  RR: 18 (08 Mar 2023 11:43) (18 - 18)  SpO2: 96% (08 Mar 2023 11:43) (96% - 99%)    Parameters below as of 08 Mar 2023 11:43  Patient On (Oxygen Delivery Method): room air      Abd: Soft, NT,ND,   Extremities: Warm  Neuro: A&O X3    LABS:  POCT Blood Glucose.: 145 mg/dL (03-08-23 @ 12:49)  POCT Blood Glucose.: 173 mg/dL (03-08-23 @ 08:58)  POCT Blood Glucose.: 230 mg/dL (03-08-23 @ 02:05)  POCT Blood Glucose.: 279 mg/dL (03-07-23 @ 21:39)  POCT Blood Glucose.: 190 mg/dL (03-07-23 @ 17:08)  POCT Blood Glucose.: 210 mg/dL (03-07-23 @ 12:53)  POCT Blood Glucose.: 180 mg/dL (03-07-23 @ 09:01)  POCT Blood Glucose.: 207 mg/dL (03-07-23 @ 02:14)  POCT Blood Glucose.: 152 mg/dL (03-06-23 @ 23:35)  POCT Blood Glucose.: 129 mg/dL (03-06-23 @ 21:26)  POCT Blood Glucose.: 133 mg/dL (03-06-23 @ 17:08)  POCT Blood Glucose.: 291 mg/dL (03-06-23 @ 12:38)  POCT Blood Glucose.: 233 mg/dL (03-06-23 @ 08:51)  POCT Blood Glucose.: 194 mg/dL (03-06-23 @ 02:21)  POCT Blood Glucose.: 115 mg/dL (03-05-23 @ 23:39)  POCT Blood Glucose.: 73 mg/dL (03-05-23 @ 22:56)  POCT Blood Glucose.: 56 mg/dL (03-05-23 @ 22:35)  POCT Blood Glucose.: 57 mg/dL (03-05-23 @ 22:10)  POCT Blood Glucose.: 55 mg/dL (03-05-23 @ 22:08)  POCT Blood Glucose.: 131 mg/dL (03-05-23 @ 17:51)               +               10.6   9.85  )-----------( 216      ( 08 Mar 2023 08:41 )             33.2       03-08    138  |  107  |  60<H>  ----------------------------<  171<H>  3.6   |  19<L>  |  2.29<H>    eGFR: 30<L>    Ca    7.7<L>      03-08  Mg     2.3     03-08  Phos  2.6     03-08        Thyroid Function Tests:  03-03 @ 15:09 TSH 1.06 FreeT4 -- T3 -- Anti TPO -- Anti Thyroglobulin Ab -- TSI --      A1C with Estimated Average Glucose Result: 4.7 % (02-17-23 @ 11:59)          Contact number: deanne [phone] or [phone]

## 2023-03-08 NOTE — PROGRESS NOTE ADULT - PROBLEM SELECTOR PLAN 3
Meeting sepsis criteria w/ tachycardia to 140s, RR 24, oral temp 100.1 --> likely higher rectal temp, w/ PNA as possible source given symptoms, CT chest findings.   - treatment of PNA as above: vanc and cefepime  - f/u transplant ID recs  - Steroids decreased to 60 mg per ID and Heme Onc reccomendation  - Prelim BCX positive for gram positive cocci in pairs, f/u final cultures Admitted w/ HR 140s, improved after giving home dose 200mg metoprolol succinate. EP consulted and determined that patient has new-onset A-flutter. TTE done, showed EF 50-55%, concentric LV remodeling, grossly normal LV and RV systolic function. Possible compensatory response 2/2 underlying infection as above.   Patient now in sinus tachycadia  - EP recs appreciated; no need for cardioversion at this time  - continue home metoprolol succinate 200mg qd  - CHADSVASC 2; start anticoagulation w/ eliquis 5mg BID  - can trial lopressor push if HR >120s, then trial diltiazem push and transition to dilt drip Meeting sepsis criteria w/ tachycardia to 140s, RR 24, oral temp 100.1 --> likely higher rectal temp, w/ PNA as possible source given symptoms, CT chest findings.   - treatment of PNA as above: vanc and cefepime  - f/u transplant ID recs  - Steroids decreased to 60 mg per ID and Heme Onc reccomendation  - Prelim BCX positive for gram positive cocci in pairs, f/u final cultures  - fungitell > 300; unlikely PJP given on ppx and on room air

## 2023-03-08 NOTE — PROGRESS NOTE ADULT - ASSESSMENT
71y/o M w/h/o HTN, HLD, NICM, chronic systolic heart failure s/p HM2 LVAD (6/2017) s/p heart transplant from Hep. C donor (treated) 2/23/18 (post op course complicated by graft dysfunction treated by plasmapheresis, IVIG, and rituximab). No DM hx per pt report. Here w/ chest tightness x1d found to be in aflutter 2/2 pneumonia and more recently found to have Hemolytic anemia> on steroids. Endocrinology consulted for management steroid induced hyperglycemia. Tolerating POs w/BG values overall improved on current insulin regimen. BG goal (100-180mg/dl). Discussed need for insulin therapy if going home on high dose steroids.

## 2023-03-08 NOTE — PROGRESS NOTE ADULT - ATTENDING COMMENTS
Patient seen and examined with DR Diop    I agree with his itnerval history exam and plans as noted above  Patient with resolving human metapneumovirus  Can discotninue isolation after five days  some of the CT GGO findings likely from his viral process along with his fevers  residual RML collapse long standing but favor follow up with Pulmonary to see if Bronchoscopy yields any improvement in aeration or diagnosis as he remians very immunospuppressed on high dose steroids in addition to his standard immunosuppressive medications   No contraindication routine post transplant myocardial biopsy     Discussed with Transplant Cardiology and Pulmonary    Gary Lujan MD  Can be called via Teams  After 5pm/weekends [phone]

## 2023-03-09 ENCOUNTER — OUTPATIENT (OUTPATIENT)
Dept: OUTPATIENT SERVICES | Facility: HOSPITAL | Age: 73
LOS: 1 days | Discharge: HOME | End: 2023-03-09

## 2023-03-09 DIAGNOSIS — D64.9 ANEMIA, UNSPECIFIED: ICD-10-CM

## 2023-03-09 DIAGNOSIS — Z94.1 HEART TRANSPLANT STATUS: Chronic | ICD-10-CM

## 2023-03-09 LAB
ANION GAP SERPL CALC-SCNC: 10 MMOL/L — SIGNIFICANT CHANGE UP (ref 5–17)
APTT BLD: 124.9 SEC — CRITICAL HIGH (ref 27.5–35.5)
APTT BLD: 97.3 SEC — HIGH (ref 27.5–35.5)
BUN SERPL-MCNC: 50 MG/DL — HIGH (ref 7–23)
CALCIUM SERPL-MCNC: 7.5 MG/DL — LOW (ref 8.4–10.5)
CHLORIDE SERPL-SCNC: 108 MMOL/L — SIGNIFICANT CHANGE UP (ref 96–108)
CO2 SERPL-SCNC: 20 MMOL/L — LOW (ref 22–31)
CREAT SERPL-MCNC: 1.8 MG/DL — HIGH (ref 0.5–1.3)
CULTURE RESULTS: SIGNIFICANT CHANGE UP
CULTURE RESULTS: SIGNIFICANT CHANGE UP
EGFR: 40 ML/MIN/1.73M2 — LOW
GLUCOSE BLDC GLUCOMTR-MCNC: 125 MG/DL — HIGH (ref 70–99)
GLUCOSE BLDC GLUCOMTR-MCNC: 144 MG/DL — HIGH (ref 70–99)
GLUCOSE BLDC GLUCOMTR-MCNC: 204 MG/DL — HIGH (ref 70–99)
GLUCOSE BLDC GLUCOMTR-MCNC: 248 MG/DL — HIGH (ref 70–99)
GLUCOSE BLDC GLUCOMTR-MCNC: 82 MG/DL — SIGNIFICANT CHANGE UP (ref 70–99)
GLUCOSE BLDC GLUCOMTR-MCNC: 89 MG/DL — SIGNIFICANT CHANGE UP (ref 70–99)
GLUCOSE SERPL-MCNC: 145 MG/DL — HIGH (ref 70–99)
HCT VFR BLD CALC: 31.8 % — LOW (ref 39–50)
HGB BLD-MCNC: 10.2 G/DL — LOW (ref 13–17)
MAGNESIUM SERPL-MCNC: 1.9 MG/DL — SIGNIFICANT CHANGE UP (ref 1.6–2.6)
MCHC RBC-ENTMCNC: 32.1 GM/DL — SIGNIFICANT CHANGE UP (ref 32–36)
MCHC RBC-ENTMCNC: 32.5 PG — SIGNIFICANT CHANGE UP (ref 27–34)
MCV RBC AUTO: 101.3 FL — HIGH (ref 80–100)
NRBC # BLD: 0 /100 WBCS — SIGNIFICANT CHANGE UP (ref 0–0)
P JIROVECII DNA L RESP QL NAA+NON-PROBE: NEGATIVE — SIGNIFICANT CHANGE UP
PHOSPHATE SERPL-MCNC: 2 MG/DL — LOW (ref 2.5–4.5)
PLATELET # BLD AUTO: 238 K/UL — SIGNIFICANT CHANGE UP (ref 150–400)
POTASSIUM SERPL-MCNC: 4 MMOL/L — SIGNIFICANT CHANGE UP (ref 3.5–5.3)
POTASSIUM SERPL-SCNC: 4 MMOL/L — SIGNIFICANT CHANGE UP (ref 3.5–5.3)
RBC # BLD: 3.14 M/UL — LOW (ref 4.2–5.8)
RBC # FLD: 15.5 % — HIGH (ref 10.3–14.5)
SODIUM SERPL-SCNC: 138 MMOL/L — SIGNIFICANT CHANGE UP (ref 135–145)
SPECIMEN SOURCE: SIGNIFICANT CHANGE UP
TACROLIMUS SERPL-MCNC: 3.4 NG/ML — SIGNIFICANT CHANGE UP
WBC # BLD: 10.3 K/UL — SIGNIFICANT CHANGE UP (ref 3.8–10.5)
WBC # FLD AUTO: 10.3 K/UL — SIGNIFICANT CHANGE UP (ref 3.8–10.5)

## 2023-03-09 PROCEDURE — 99233 SBSQ HOSP IP/OBS HIGH 50: CPT

## 2023-03-09 PROCEDURE — 99232 SBSQ HOSP IP/OBS MODERATE 35: CPT | Mod: GC

## 2023-03-09 PROCEDURE — 99232 SBSQ HOSP IP/OBS MODERATE 35: CPT

## 2023-03-09 RX ORDER — DILTIAZEM HCL 120 MG
30 CAPSULE, EXT RELEASE 24 HR ORAL EVERY 6 HOURS
Refills: 0 | Status: DISCONTINUED | OUTPATIENT
Start: 2023-03-09 | End: 2023-03-10

## 2023-03-09 RX ORDER — DILTIAZEM HCL 120 MG
30 CAPSULE, EXT RELEASE 24 HR ORAL DAILY
Refills: 0 | Status: DISCONTINUED | OUTPATIENT
Start: 2023-03-09 | End: 2023-03-09

## 2023-03-09 RX ORDER — APIXABAN 2.5 MG/1
5 TABLET, FILM COATED ORAL EVERY 12 HOURS
Refills: 0 | Status: DISCONTINUED | OUTPATIENT
Start: 2023-03-09 | End: 2023-03-18

## 2023-03-09 RX ORDER — SODIUM,POTASSIUM PHOSPHATES 278-250MG
1 POWDER IN PACKET (EA) ORAL ONCE
Refills: 0 | Status: COMPLETED | OUTPATIENT
Start: 2023-03-09 | End: 2023-03-09

## 2023-03-09 RX ORDER — INSULIN LISPRO 100/ML
4 VIAL (ML) SUBCUTANEOUS
Refills: 0 | Status: DISCONTINUED | OUTPATIENT
Start: 2023-03-09 | End: 2023-03-12

## 2023-03-09 RX ADMIN — Medication 200 MILLIGRAM(S): at 06:19

## 2023-03-09 RX ADMIN — Medication 200 MILLIGRAM(S): at 01:18

## 2023-03-09 RX ADMIN — Medication 4: at 13:07

## 2023-03-09 RX ADMIN — Medication 200 MILLIGRAM(S): at 16:54

## 2023-03-09 RX ADMIN — Medication 200 MILLIGRAM(S): at 06:18

## 2023-03-09 RX ADMIN — SIMETHICONE 80 MILLIGRAM(S): 80 TABLET, CHEWABLE ORAL at 11:46

## 2023-03-09 RX ADMIN — Medication 500000 UNIT(S): at 13:09

## 2023-03-09 RX ADMIN — SENNA PLUS 1 TABLET(S): 8.6 TABLET ORAL at 11:46

## 2023-03-09 RX ADMIN — Medication 3 MILLILITER(S): at 01:12

## 2023-03-09 RX ADMIN — HEPARIN SODIUM 1100 UNIT(S)/HR: 5000 INJECTION INTRAVENOUS; SUBCUTANEOUS at 01:13

## 2023-03-09 RX ADMIN — Medication 30 MILLIGRAM(S): at 11:55

## 2023-03-09 RX ADMIN — Medication 500000 UNIT(S): at 08:36

## 2023-03-09 RX ADMIN — SODIUM CHLORIDE 4 MILLILITER(S): 9 INJECTION INTRAMUSCULAR; INTRAVENOUS; SUBCUTANEOUS at 06:19

## 2023-03-09 RX ADMIN — PANTOPRAZOLE SODIUM 40 MILLIGRAM(S): 20 TABLET, DELAYED RELEASE ORAL at 06:19

## 2023-03-09 RX ADMIN — Medication 200 MILLIGRAM(S): at 11:49

## 2023-03-09 RX ADMIN — Medication 1 MILLIGRAM(S): at 11:45

## 2023-03-09 RX ADMIN — INSULIN GLARGINE 20 UNIT(S): 100 INJECTION, SOLUTION SUBCUTANEOUS at 22:03

## 2023-03-09 RX ADMIN — APIXABAN 5 MILLIGRAM(S): 2.5 TABLET, FILM COATED ORAL at 14:47

## 2023-03-09 RX ADMIN — Medication 125 MILLIGRAM(S): at 16:55

## 2023-03-09 RX ADMIN — Medication 3 MILLILITER(S): at 17:17

## 2023-03-09 RX ADMIN — TACROLIMUS 0.5 MILLIGRAM(S): 5 CAPSULE ORAL at 08:36

## 2023-03-09 RX ADMIN — Medication 14 UNIT(S): at 09:29

## 2023-03-09 RX ADMIN — Medication 30 MILLIGRAM(S): at 16:55

## 2023-03-09 RX ADMIN — Medication 3 MILLILITER(S): at 06:24

## 2023-03-09 RX ADMIN — Medication 500000 UNIT(S): at 16:46

## 2023-03-09 RX ADMIN — Medication 1 PACKET(S): at 11:46

## 2023-03-09 RX ADMIN — TACROLIMUS 0.5 MILLIGRAM(S): 5 CAPSULE ORAL at 21:06

## 2023-03-09 RX ADMIN — Medication 4 UNIT(S): at 13:08

## 2023-03-09 RX ADMIN — Medication 3 MILLILITER(S): at 11:46

## 2023-03-09 RX ADMIN — Medication 500000 UNIT(S): at 21:07

## 2023-03-09 RX ADMIN — ATORVASTATIN CALCIUM 10 MILLIGRAM(S): 80 TABLET, FILM COATED ORAL at 21:07

## 2023-03-09 RX ADMIN — Medication 60 MILLIGRAM(S): at 06:20

## 2023-03-09 RX ADMIN — Medication 81 MILLIGRAM(S): at 11:48

## 2023-03-09 RX ADMIN — Medication 125 MILLIGRAM(S): at 06:20

## 2023-03-09 RX ADMIN — Medication 4 UNIT(S): at 17:49

## 2023-03-09 RX ADMIN — HEPARIN SODIUM 900 UNIT(S)/HR: 5000 INJECTION INTRAVENOUS; SUBCUTANEOUS at 09:32

## 2023-03-09 RX ADMIN — SODIUM CHLORIDE 4 MILLILITER(S): 9 INJECTION INTRAMUSCULAR; INTRAVENOUS; SUBCUTANEOUS at 16:56

## 2023-03-09 NOTE — DIETITIAN INITIAL EVALUATION ADULT - PERTINENT LABORATORY DATA
03-09    138  |  108  |  50<H>  ----------------------------<  145<H>  4.0   |  20<L>  |  1.80<H>    Ca    7.5<L>      09 Mar 2023 08:24  Phos  2.0     03-09  Mg     1.9     03-09    POCT Blood Glucose.: 144 mg/dL (03-09-23 @ 08:59)  A1C with Estimated Average Glucose Result: 4.7 % (02-17-23 @ 11:59)

## 2023-03-09 NOTE — DIETITIAN INITIAL EVALUATION ADULT - PERTINENT MEDS FT
MEDICATIONS  (STANDING):  albuterol/ipratropium for Nebulization 3 milliLiter(s) Nebulizer every 6 hours  aspirin enteric coated 81 milliGRAM(s) Oral daily  atorvastatin 10 milliGRAM(s) Oral at bedtime  dextrose 5%. 1000 milliLiter(s) (50 mL/Hr) IV Continuous <Continuous>  dextrose 5%. 1000 milliLiter(s) (100 mL/Hr) IV Continuous <Continuous>  dextrose 50% Injectable 25 Gram(s) IV Push once  dextrose 50% Injectable 12.5 Gram(s) IV Push once  dextrose 50% Injectable 25 Gram(s) IV Push once  dextrose Oral Gel 15 Gram(s) Oral once  diltiazem    Tablet 30 milliGRAM(s) Oral every 6 hours  folic acid 1 milliGRAM(s) Oral daily  glucagon  Injectable 1 milliGRAM(s) IntraMuscular once  guaiFENesin Oral Liquid (Sugar-Free) 200 milliGRAM(s) Oral every 6 hours  heparin  Infusion.  Unit(s)/Hr (13 mL/Hr) IV Continuous <Continuous>  insulin glargine Injectable (LANTUS) 20 Unit(s) SubCutaneous at bedtime  insulin lispro (ADMELOG) corrective regimen sliding scale   SubCutaneous <User Schedule>  insulin lispro (ADMELOG) corrective regimen sliding scale   SubCutaneous three times a day before meals  insulin lispro Injectable (ADMELOG) 4 Unit(s) SubCutaneous before dinner  insulin lispro Injectable (ADMELOG) 14 Unit(s) SubCutaneous before breakfast  insulin lispro Injectable (ADMELOG) 4 Unit(s) SubCutaneous before lunch  metoprolol succinate  milliGRAM(s) Oral daily  nystatin    Suspension 602577 Unit(s) Oral <User Schedule>  pantoprazole    Tablet 40 milliGRAM(s) Oral before breakfast  polyethylene glycol 3350 17 Gram(s) Oral daily  predniSONE   Tablet 60 milliGRAM(s) Oral daily  senna 1 Tablet(s) Oral daily  simethicone 80 milliGRAM(s) Chew daily  sodium chloride 3%  Inhalation 4 milliLiter(s) Inhalation every 12 hours  tacrolimus 0.5 milliGRAM(s) Oral <User Schedule>  trimethoprim   80 mG/sulfamethoxazole 400 mG 1 Tablet(s) Oral every 48 hours  valGANciclovir 450 milliGRAM(s) Oral <User Schedule>  vancomycin    Solution 125 milliGRAM(s) Oral every 12 hours    MEDICATIONS  (PRN):  acetaminophen     Tablet .. 650 milliGRAM(s) Oral every 6 hours PRN Temp greater or equal to 38C (100.4F), Mild Pain (1 - 3)  melatonin 3 milliGRAM(s) Oral at bedtime PRN Insomnia

## 2023-03-09 NOTE — PROGRESS NOTE ADULT - ATTENDING COMMENTS
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 Rosario, on prednisone 75 mg daily), HTN, HLD, NICM, chronic systolic heart failure s/p HM2 LVAD (6/2017) s/p heart transplant from HCV+ donor (treated) on 2/23/18 (post-op course complicated by graft dysfunction treated by plasmapheresis, IVIG, and rituximab), who presented chest tightness and noted to have human metapneumovirus. 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    #Nicole syndrome: Diagnosed after he was admitted to [facility] on 1/4/23 for hemolytic anemia with Hgb 6.5. He was treated with pulse dose dexamethasone and IVIG. He received 1 unit of pRBCs and prednisone. He waas in remission with well compensated hemolytic anemia on low dose prednisone, but relapsed after tapered to 3 mg daily. He was restarted on prednisone 75 mg daily and is responding with improvement in counts.   - Trend CBC with differential daily. Continue supportive transfusions to maintain Hgb > 7 and plt > 10.   - Trend hemolysis markers daily: LDH, reticulocyte count  - Continue prednisone 60 mg daily. Will plan to discharge home on this dose, with further tapering per Dr. Rosario.  - PPI for GI prophylaxis  - Bactrim for PCP prophylaxis  - Follow up with Dr. Rosario when stable for discharge.

## 2023-03-09 NOTE — PROGRESS NOTE ADULT - SUBJECTIVE AND OBJECTIVE BOX
Diabetes Follow up note:    Chief complaint: Steroid induced hyperglycemia    Interval Hx: Pt w/hypoglycemia prior to dinner in 60s. Next BG at bedtime in 270s. Pt seen at bedside. Reports feeling ok, tolerating POs today. Pt asking questions regarding insulin management/BG goals appropriately today.     Review of Systems:  General: denies pain  GI: Tolerating POs. Denies N/V/D/Abd pain  CV: Denies CP/SOB  ENDO: No S&Sx of hypoglycemia    MEDS:  atorvastatin 10 milliGRAM(s) Oral at bedtime  insulin glargine Injectable (LANTUS) 20 Unit(s) SubCutaneous at bedtime  insulin lispro (ADMELOG) corrective regimen sliding scale   SubCutaneous <User Schedule>  insulin lispro (ADMELOG) corrective regimen sliding scale   SubCutaneous three times a day before meals  insulin lispro Injectable (ADMELOG) 4 Unit(s) SubCutaneous before dinner  insulin lispro Injectable (ADMELOG) 4 Unit(s) SubCutaneous before lunch  insulin lispro Injectable (ADMELOG) 14 Unit(s) SubCutaneous before breakfast  predniSONE   Tablet 60 milliGRAM(s) Oral daily    nystatin    Suspension 313636 Unit(s) Oral <User Schedule>  trimethoprim   80 mG/sulfamethoxazole 400 mG 1 Tablet(s) Oral every 48 hours  valGANciclovir 450 milliGRAM(s) Oral <User Schedule>  vancomycin    Solution 125 milliGRAM(s) Oral every 12 hours    Allergies    No Known Allergies        PE:  General: Male lying in bed. NAD.   Vital Signs Last 24 Hrs  T(C): 36.3 (09 Mar 2023 06:25), Max: 36.8 (08 Mar 2023 21:36)  T(F): 97.4 (09 Mar 2023 06:25), Max: 98.3 (08 Mar 2023 21:36)  HR: 105 (09 Mar 2023 06:25) (99 - 105)  BP: 137/92 (09 Mar 2023 06:25) (110/75 - 152/91)  BP(mean): 87 (08 Mar 2023 11:43) (87 - 87)  RR: 18 (09 Mar 2023 06:25) (18 - 18)  SpO2: 98% (09 Mar 2023 06:25) (96% - 98%)    Parameters below as of 09 Mar 2023 06:25  Patient On (Oxygen Delivery Method): room air      Abd: Soft, NT,ND,   Extremities: Warm  Neuro: A&O X3    LABS:  POCT Blood Glucose.: 144 mg/dL (03-09-23 @ 08:59)  POCT Blood Glucose.: 204 mg/dL (03-09-23 @ 02:49)  POCT Blood Glucose.: 275 mg/dL (03-08-23 @ 21:37)  POCT Blood Glucose.: 66 mg/dL (03-08-23 @ 17:19)  POCT Blood Glucose.: 145 mg/dL (03-08-23 @ 12:49)  POCT Blood Glucose.: 173 mg/dL (03-08-23 @ 08:58)  POCT Blood Glucose.: 230 mg/dL (03-08-23 @ 02:05)  POCT Blood Glucose.: 279 mg/dL (03-07-23 @ 21:39)  POCT Blood Glucose.: 190 mg/dL (03-07-23 @ 17:08)  POCT Blood Glucose.: 210 mg/dL (03-07-23 @ 12:53)  POCT Blood Glucose.: 180 mg/dL (03-07-23 @ 09:01)  POCT Blood Glucose.: 207 mg/dL (03-07-23 @ 02:14)  POCT Blood Glucose.: 152 mg/dL (03-06-23 @ 23:35)  POCT Blood Glucose.: 129 mg/dL (03-06-23 @ 21:26)  POCT Blood Glucose.: 133 mg/dL (03-06-23 @ 17:08)  POCT Blood Glucose.: 291 mg/dL (03-06-23 @ 12:38)                            10.2   10.30 )-----------( 238      ( 09 Mar 2023 08:24 )             31.8       03-09    138  |  108  |  50<H>  ----------------------------<  145<H>  4.0   |  20<L>  |  1.80<H>    eGFR: 40<L>    Ca    7.5<L>      03-09  Mg     1.9     03-09  Phos  2.0     03-09        Thyroid Function Tests:  03-03 @ 15:09 TSH 1.06 FreeT4 -- T3 -- Anti TPO -- Anti Thyroglobulin Ab -- TSI --      A1C with Estimated Average Glucose Result: 4.7 % (02-17-23 @ 11:59)          Contact number: deanne [phone] or [phone]

## 2023-03-09 NOTE — PROGRESS NOTE ADULT - PROBLEM SELECTOR PLAN 2
Admitted w/ HR 140s, improved after giving home dose 200mg metoprolol succinate. EP consulted and determined that patient has new-onset A-flutter. TTE done, showed EF 50-55%, concentric LV remodeling, grossly normal LV and RV systolic function. Possible compensatory response 2/2 underlying infection as above.   Patient now in sinus tachycadia  - EP recs appreciated; no need for cardioversion at this time  - continue home metoprolol succinate 200mg qd; with resolution of infection if persistently tachycardic, consider addition of CCB   - CHADSVASC 2; start anticoagulation w/ eliquis 5mg BID --> transitioned to heparin gtt for biopsy   - can trial lopressor push if HR >120s, then trial diltiazem push and transition to dilt drip  - Pending endomyocardial biopsy on 3/8 to rule out rejection Admitted w/ HR 140s, improved after giving home dose 200mg metoprolol succinate. EP consulted and determined that patient has new-onset A-flutter. TTE done, showed EF 50-55%, concentric LV remodeling, grossly normal LV and RV systolic function. Possible compensatory response 2/2 underlying infection as above.   Patient now in sinus tachycadia  - EP recs appreciated; no need for cardioversion at this time  - continue home metoprolol succinate 200mg qd; added diltiazem 30 Q6h for persistent tachycardia --> will transition to extended release upon discharge after uptitration of diltiazem   - CHADSVASC 2; start anticoagulation w/ eliquis 5mg BID --> transitioned to heparin gtt for biopsy   - can trial lopressor push if HR >120s, then trial diltiazem push and transition to dilt drip  - Pending endomyocardial biopsy on 3/8 to rule out rejection

## 2023-03-09 NOTE — PROGRESS NOTE ADULT - PROBLEM SELECTOR PLAN 1
-test BG AC/HS  -C/w Lantus 20 units QHS for now  -Adjust Admelog 14-4-4 w/meals. HOLD IF NOT EATING   -c/w Admelog moderate correction scale AC and mod HS scale for now  -Needs RD consult  -On prednisone 60mg PO daily. Notify endocrine team if this is changed.   - Please teach and allow pt to do own finger sticks and insulin injections under RN supervision in case pt  is discharged home on high dose steroids requiring insulin therapy.   Please teach use of insulin pen  Please document teach back  Discharge plan:  -Will be determined according to BG/insulin needs/PO intake and steroid therapy at time of discharge.  Basal/bolus vs basal + Prandin  -Make sure pt has Rx for all DM supplies and insulin/ DM meds.  - Home care due to new DM diagnosis and likely need of insulin therapy  - Patient to call doctor with persistent high or low BG at home.   - Recommend routine outpatient ophthalmology, podiatry   - Will need endocrinology f/u if pt send home on insulin/high dose steroids Can follow at [facility] [place]. [address] [place] [address] [address]. Phone .   1) March 28 11am w/NP  2) Aug 15th 10:45am w/Dr Clemens

## 2023-03-09 NOTE — DIETITIAN INITIAL EVALUATION ADULT - NS FNS DIET ORDER
Diet, Regular:   Consistent Carbohydrate {No Snacks} (CSTCHO)  DASH/TLC {Sodium & Cholesterol Restricted} (DASH) (03-04-23 @ 11:54) [Active]

## 2023-03-09 NOTE — DIETITIAN INITIAL EVALUATION ADULT - REASON
Pt with no overt signs of muscle wasting or fat loss. Pt reports good appetite and PO intake in house.

## 2023-03-09 NOTE — PROGRESS NOTE ADULT - ASSESSMENT
73 y/o male with hx of nonischemic cardiomyopathy s/p HM2 LVAD, underwent OHT 2/23/18 with hepatitis C donor, hx of hemolytic anemia presented w/ 1d chest tightness, found to have new onset aflutter w/ RVR, Patient found to have human metapneumovirus with likely superimposed bacterial infection given elevated procal and CT findings of RLL and RML collapse. Course c/b elevated fungitell. Pending endomyocardial biopsy to rule out rejection.

## 2023-03-09 NOTE — PROGRESS NOTE ADULT - NS ATTEST RISK PROBLEM GEN_ALL_CORE FT
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

## 2023-03-09 NOTE — PROGRESS NOTE ADULT - SUBJECTIVE AND OBJECTIVE BOX
INTERVAL HPI/OVERNIGHT EVENTS:    SUBJECTIVE: Patient seen and examined at bedside.    OBJECTIVE:    VITAL SIGNS:  ICU Vital Signs Last 24 Hrs  T(C): 36.3 (09 Mar 2023 06:25), Max: 36.8 (08 Mar 2023 21:36)  T(F): 97.4 (09 Mar 2023 06:25), Max: 98.3 (08 Mar 2023 21:36)  HR: 105 (09 Mar 2023 06:25) (99 - 105)  BP: 137/92 (09 Mar 2023 06:25) (110/75 - 152/91)  BP(mean): 87 (08 Mar 2023 11:43) (87 - 87)  ABP: --  ABP(mean): --  RR: 18 (09 Mar 2023 06:25) (18 - 18)  SpO2: 98% (09 Mar 2023 06:25) (96% - 98%)    O2 Parameters below as of 09 Mar 2023 06:25  Patient On (Oxygen Delivery Method): room air              03-08 @ 07:01  -  03-09 @ 07:00  --------------------------------------------------------  IN: 560 mL / OUT: 600 mL / NET: -40 mL      CAPILLARY BLOOD GLUCOSE      POCT Blood Glucose.: 204 mg/dL (09 Mar 2023 02:49)      PHYSICAL EXAM:    General: NAD  HEENT: NC/AT; PERRL, clear conjunctiva  Neck: supple  Respiratory: CTA b/l  Cardiovascular: +S1/S2; RRR  Abdomen: soft, NT/ND; +BS x4  Extremities:  no LE edema  Vascular: WWP, 2+ peripheral pulses b/l;  Skin: normal color and turgor; no rash  Neurological: A&Ox3, move all extremities. CN II-XII intact    MEDICATIONS:  MEDICATIONS  (STANDING):  albuterol/ipratropium for Nebulization 3 milliLiter(s) Nebulizer every 6 hours  aspirin enteric coated 81 milliGRAM(s) Oral daily  atorvastatin 10 milliGRAM(s) Oral at bedtime  dextrose 5%. 1000 milliLiter(s) (50 mL/Hr) IV Continuous <Continuous>  dextrose 5%. 1000 milliLiter(s) (100 mL/Hr) IV Continuous <Continuous>  dextrose 50% Injectable 25 Gram(s) IV Push once  dextrose 50% Injectable 12.5 Gram(s) IV Push once  dextrose 50% Injectable 25 Gram(s) IV Push once  dextrose Oral Gel 15 Gram(s) Oral once  folic acid 1 milliGRAM(s) Oral daily  glucagon  Injectable 1 milliGRAM(s) IntraMuscular once  guaiFENesin Oral Liquid (Sugar-Free) 200 milliGRAM(s) Oral every 6 hours  heparin  Infusion.  Unit(s)/Hr (13 mL/Hr) IV Continuous <Continuous>  insulin glargine Injectable (LANTUS) 20 Unit(s) SubCutaneous at bedtime  insulin lispro (ADMELOG) corrective regimen sliding scale   SubCutaneous <User Schedule>  insulin lispro (ADMELOG) corrective regimen sliding scale   SubCutaneous three times a day before meals  insulin lispro Injectable (ADMELOG) 6 Unit(s) SubCutaneous before lunch  insulin lispro Injectable (ADMELOG) 4 Unit(s) SubCutaneous before dinner  insulin lispro Injectable (ADMELOG) 14 Unit(s) SubCutaneous before breakfast  lactated ringers. 1000 milliLiter(s) (50 mL/Hr) IV Continuous <Continuous>  metoprolol succinate  milliGRAM(s) Oral daily  nystatin    Suspension 719194 Unit(s) Oral <User Schedule>  pantoprazole    Tablet 40 milliGRAM(s) Oral before breakfast  polyethylene glycol 3350 17 Gram(s) Oral daily  predniSONE   Tablet 60 milliGRAM(s) Oral daily  senna 1 Tablet(s) Oral daily  simethicone 80 milliGRAM(s) Chew daily  sodium chloride 3%  Inhalation 4 milliLiter(s) Inhalation every 12 hours  tacrolimus 0.5 milliGRAM(s) Oral <User Schedule>  trimethoprim   80 mG/sulfamethoxazole 400 mG 1 Tablet(s) Oral every 48 hours  valGANciclovir 450 milliGRAM(s) Oral <User Schedule>  vancomycin    Solution 125 milliGRAM(s) Oral every 12 hours    MEDICATIONS  (PRN):  acetaminophen     Tablet .. 650 milliGRAM(s) Oral every 6 hours PRN Temp greater or equal to 38C (100.4F), Mild Pain (1 - 3)  melatonin 3 milliGRAM(s) Oral at bedtime PRN Insomnia      ALLERGIES:  Allergies    No Known Allergies    Intolerances        LABS:                        10.6   9.85  )-----------( 216      ( 08 Mar 2023 08:41 )             33.2     03-08    138  |  107  |  60<H>  ----------------------------<  171<H>  3.6   |  19<L>  |  2.29<H>    Ca    7.7<L>      08 Mar 2023 08:41  Phos  2.6     03-08  Mg     2.3     03-08      PT/INR - ( 07 Mar 2023 17:39 )   PT: 13.6 sec;   INR: 1.17 ratio         PTT - ( 08 Mar 2023 23:48 )  PTT:97.3 sec      RADIOLOGY & ADDITIONAL TESTS: Reviewed. INTERVAL HPI/OVERNIGHT EVENTS: KEVIN OVN    SUBJECTIVE: Patient seen and examined at bedside. Patient states he is feeling well with improved abdominal pain and chest tightness.    OBJECTIVE:    VITAL SIGNS:  ICU Vital Signs Last 24 Hrs  T(C): 36.3 (09 Mar 2023 06:25), Max: 36.8 (08 Mar 2023 21:36)  T(F): 97.4 (09 Mar 2023 06:25), Max: 98.3 (08 Mar 2023 21:36)  HR: 105 (09 Mar 2023 06:25) (99 - 105)  BP: 137/92 (09 Mar 2023 06:25) (110/75 - 152/91)  BP(mean): 87 (08 Mar 2023 11:43) (87 - 87)  ABP: --  ABP(mean): --  RR: 18 (09 Mar 2023 06:25) (18 - 18)  SpO2: 98% (09 Mar 2023 06:25) (96% - 98%)    O2 Parameters below as of 09 Mar 2023 06:25  Patient On (Oxygen Delivery Method): room air              03-08 @ 07:01  -  03-09 @ 07:00  --------------------------------------------------------  IN: 560 mL / OUT: 600 mL / NET: -40 mL      CAPILLARY BLOOD GLUCOSE      POCT Blood Glucose.: 204 mg/dL (09 Mar 2023 02:49)      PHYSICAL EXAM:    General: NAD  HEENT: NC/AT; PERRL, clear conjunctiva  Neck: supple  Respiratory: Rhonchorus breath sounds referred from upper airway  Cardiovascular: +S1/S2; RRR  Abdomen: soft, NT/ND; +BS x4  Extremities:  no LE edema  Vascular: WWP, 2+ peripheral pulses b/l;  Skin: normal color and turgor; no rash  Neurological: A&Ox3, move all extremities. CN II-XII intact    MEDICATIONS:  MEDICATIONS  (STANDING):  albuterol/ipratropium for Nebulization 3 milliLiter(s) Nebulizer every 6 hours  aspirin enteric coated 81 milliGRAM(s) Oral daily  atorvastatin 10 milliGRAM(s) Oral at bedtime  dextrose 5%. 1000 milliLiter(s) (50 mL/Hr) IV Continuous <Continuous>  dextrose 5%. 1000 milliLiter(s) (100 mL/Hr) IV Continuous <Continuous>  dextrose 50% Injectable 25 Gram(s) IV Push once  dextrose 50% Injectable 12.5 Gram(s) IV Push once  dextrose 50% Injectable 25 Gram(s) IV Push once  dextrose Oral Gel 15 Gram(s) Oral once  folic acid 1 milliGRAM(s) Oral daily  glucagon  Injectable 1 milliGRAM(s) IntraMuscular once  guaiFENesin Oral Liquid (Sugar-Free) 200 milliGRAM(s) Oral every 6 hours  heparin  Infusion.  Unit(s)/Hr (13 mL/Hr) IV Continuous <Continuous>  insulin glargine Injectable (LANTUS) 20 Unit(s) SubCutaneous at bedtime  insulin lispro (ADMELOG) corrective regimen sliding scale   SubCutaneous <User Schedule>  insulin lispro (ADMELOG) corrective regimen sliding scale   SubCutaneous three times a day before meals  insulin lispro Injectable (ADMELOG) 6 Unit(s) SubCutaneous before lunch  insulin lispro Injectable (ADMELOG) 4 Unit(s) SubCutaneous before dinner  insulin lispro Injectable (ADMELOG) 14 Unit(s) SubCutaneous before breakfast  lactated ringers. 1000 milliLiter(s) (50 mL/Hr) IV Continuous <Continuous>  metoprolol succinate  milliGRAM(s) Oral daily  nystatin    Suspension 870075 Unit(s) Oral <User Schedule>  pantoprazole    Tablet 40 milliGRAM(s) Oral before breakfast  polyethylene glycol 3350 17 Gram(s) Oral daily  predniSONE   Tablet 60 milliGRAM(s) Oral daily  senna 1 Tablet(s) Oral daily  simethicone 80 milliGRAM(s) Chew daily  sodium chloride 3%  Inhalation 4 milliLiter(s) Inhalation every 12 hours  tacrolimus 0.5 milliGRAM(s) Oral <User Schedule>  trimethoprim   80 mG/sulfamethoxazole 400 mG 1 Tablet(s) Oral every 48 hours  valGANciclovir 450 milliGRAM(s) Oral <User Schedule>  vancomycin    Solution 125 milliGRAM(s) Oral every 12 hours    MEDICATIONS  (PRN):  acetaminophen     Tablet .. 650 milliGRAM(s) Oral every 6 hours PRN Temp greater or equal to 38C (100.4F), Mild Pain (1 - 3)  melatonin 3 milliGRAM(s) Oral at bedtime PRN Insomnia      ALLERGIES:  Allergies    No Known Allergies    Intolerances        LABS:                        10.6   9.85  )-----------( 216      ( 08 Mar 2023 08:41 )             33.2     03-08    138  |  107  |  60<H>  ----------------------------<  171<H>  3.6   |  19<L>  |  2.29<H>    Ca    7.7<L>      08 Mar 2023 08:41  Phos  2.6     03-08  Mg     2.3     03-08      PT/INR - ( 07 Mar 2023 17:39 )   PT: 13.6 sec;   INR: 1.17 ratio         PTT - ( 08 Mar 2023 23:48 )  PTT:97.3 sec      RADIOLOGY & ADDITIONAL TESTS: Reviewed.

## 2023-03-09 NOTE — PROGRESS NOTE ADULT - ASSESSMENT
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 C donor (treated) 2/23/18 (post op course complicated by graft dysfunction treated by plasmapheresis, IVIG, and rituximab), presenting w/ chest tightness and noted to have hMVP. 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    #Grayson's syndrome  -Diagnosed after he was admitted to [facility] on 1/4/23 for hemolytic anemia with hgb 6.5. He was treated with Pulse Decadron and IVIG. He received 1 unit of packed red blood cells and then prednisone was started. He was discharged on 1/7/23. Was in remission with well compensated hemolytic anemia on low dose prednisone, but relapsed after tapered to 3 mg daily. Was started on prednisone 75 mg daily and is responding with improvement in counts.  -c/w prednisone 60mg, will discharge him on this dose.  -Monitor glucose levels and treat appropriately   -Daily CBC, counts have been stable throughout hospitalization   -Low suspicion for hemolysis  -Further care by transplant team and ID    -Follow up with Dr. Rosario when stable for discharge    Please page with questions or concerns. Will Follow with you.      Taye Mckeon M.D.  Hematology/Oncology Fellow PGY5  Pager [phone]  After 5pm, please contact on-call team.

## 2023-03-09 NOTE — DIETITIAN INITIAL EVALUATION ADULT - ENERGY INTAKE
In house, pt reports improved appetite and adequate PO intake. No PO intake information available per flowsheets at this time. Pt reports he is consuming almost ~100% of each meal x admission.  Adequate (%)

## 2023-03-09 NOTE — PROGRESS NOTE ADULT - PROBLEM SELECTOR PLAN 3
Meeting sepsis criteria w/ tachycardia to 140s, RR 24, oral temp 100.1 --> likely higher rectal temp, w/ PNA as possible source given symptoms, CT chest findings.   - treatment of PNA as above: vanc and cefepime  - f/u transplant ID recs  - Steroids decreased to 60 mg per ID and Heme Onc reccomendation  - Prelim BCX positive for gram positive cocci in pairs, f/u final cultures  - fungitell > 300; unlikely PJP given on ppx and on room air

## 2023-03-09 NOTE — DIETITIAN INITIAL EVALUATION ADULT - ADD RECOMMEND
1) Continue DASH, Consistent Carbohydrate diet as tolerated. Defer texture/consistency to SLP/team.   2) Recommend providing Multivitamin daily to prevent micronutrient deficiencies pending no medical contraindications.  3) Continue to monitor PO intake, weight, labs, skin, GI status, and diet.  4) Encourage consistent PO intake to maintain BG levels. Provide assistance/encouragement with meals PRN to promote adequate PO intake.    5) RD remains available for diet education/reinforcement PRN.

## 2023-03-09 NOTE — DIETITIAN INITIAL EVALUATION ADULT - OTHER INFO
Per pt, UBW: 160-165 pounds   Dosing wt: 160 pounds (3/03)  Wt hx per chart: 160 pounds (3/06, bed), 159.6 pounds (3/07, bed).   RD to continue to monitor weight trends as able.   Nutritionally Pertinent Meds in-house: Abx, IVF, admelog, lantus, SSI, Phos-NaK, steroid, atorvastatin, folic acid, protonix,   Nutritionally Pertinent Labs: high POCT; high BG; low Ca; low Phos; A1c 4.7% (2/17) - WNL.   - S/P heart transplant 2018.   - Endocrine: Steroid induced hyperglycemia; "Discussed need for consistent PO intake while on steroid therapy."  - Pulm: reconsulted for elevated Fungitel elevated.

## 2023-03-09 NOTE — PROGRESS NOTE ADULT - SUBJECTIVE AND OBJECTIVE BOX
Hematology/Oncology Follow-up    INTERVAL HPI/OVERNIGHT EVENTS:  Patient S&E at bedside. No o/n events, patient resting comfortably. No complaints at this time. Patient denies fever, chills, dizziness, weakness, CP, palpitations, SOB, cough, N/V/D/C, dysuria, changes in bowel movements, LE edema.    VITAL SIGNS:  T(F): 97.5 (03-09-23 @ 12:12)  HR: 96 (03-09-23 @ 12:12)  BP: 127/82 (03-09-23 @ 12:12)  RR: 18 (03-09-23 @ 12:12)  SpO2: 100% (03-09-23 @ 12:12)  Wt(kg): --    PHYSICAL EXAM:    Constitutional: AAOx3, NAD,   Eyes: PERRL, EOMI, sclera non-icteric  Neck: supple, no masses, no JVD  Respiratory: CTA b/l, good air entry b/l, no wheezing, rhonchi, rales, with normal respiratory effort and no intercostal retractions  Cardiovascular: RRR, normal S1S2, no M/R/G  Gastrointestinal: soft, NTND, no masses palpable, BS normal in all four quadrants, no HSM  Extremities:  no c/c/e  Neurological: Grossly intact  Skin: No rash or lesion    MEDICATIONS  (STANDING):  albuterol/ipratropium for Nebulization 3 milliLiter(s) Nebulizer every 6 hours  apixaban 5 milliGRAM(s) Oral every 12 hours  aspirin enteric coated 81 milliGRAM(s) Oral daily  atorvastatin 10 milliGRAM(s) Oral at bedtime  dextrose 5%. 1000 milliLiter(s) (50 mL/Hr) IV Continuous <Continuous>  dextrose 5%. 1000 milliLiter(s) (100 mL/Hr) IV Continuous <Continuous>  dextrose 50% Injectable 25 Gram(s) IV Push once  dextrose 50% Injectable 12.5 Gram(s) IV Push once  dextrose 50% Injectable 25 Gram(s) IV Push once  dextrose Oral Gel 15 Gram(s) Oral once  diltiazem    Tablet 30 milliGRAM(s) Oral every 6 hours  folic acid 1 milliGRAM(s) Oral daily  glucagon  Injectable 1 milliGRAM(s) IntraMuscular once  guaiFENesin Oral Liquid (Sugar-Free) 200 milliGRAM(s) Oral every 6 hours  insulin glargine Injectable (LANTUS) 20 Unit(s) SubCutaneous at bedtime  insulin lispro (ADMELOG) corrective regimen sliding scale   SubCutaneous <User Schedule>  insulin lispro (ADMELOG) corrective regimen sliding scale   SubCutaneous three times a day before meals  insulin lispro Injectable (ADMELOG) 4 Unit(s) SubCutaneous before dinner  insulin lispro Injectable (ADMELOG) 14 Unit(s) SubCutaneous before breakfast  insulin lispro Injectable (ADMELOG) 4 Unit(s) SubCutaneous before lunch  metoprolol succinate  milliGRAM(s) Oral daily  nystatin    Suspension 868709 Unit(s) Oral <User Schedule>  pantoprazole    Tablet 40 milliGRAM(s) Oral before breakfast  polyethylene glycol 3350 17 Gram(s) Oral daily  predniSONE   Tablet 60 milliGRAM(s) Oral daily  senna 1 Tablet(s) Oral daily  simethicone 80 milliGRAM(s) Chew daily  sodium chloride 3%  Inhalation 4 milliLiter(s) Inhalation every 12 hours  tacrolimus 0.5 milliGRAM(s) Oral <User Schedule>  trimethoprim   80 mG/sulfamethoxazole 400 mG 1 Tablet(s) Oral every 48 hours  valGANciclovir 450 milliGRAM(s) Oral <User Schedule>  vancomycin    Solution 125 milliGRAM(s) Oral every 12 hours    MEDICATIONS  (PRN):  acetaminophen     Tablet .. 650 milliGRAM(s) Oral every 6 hours PRN Temp greater or equal to 38C (100.4F), Mild Pain (1 - 3)  melatonin 3 milliGRAM(s) Oral at bedtime PRN Insomnia      No Known Allergies      LABS:                        10.2   10.30 )-----------( 238      ( 09 Mar 2023 08:24 )             31.8     03-09    138  |  108  |  50<H>  ----------------------------<  145<H>  4.0   |  20<L>  |  1.80<H>    Ca    7.5<L>      09 Mar 2023 08:24  Phos  2.0     03-09  Mg     1.9     03-09      PT/INR - ( 07 Mar 2023 17:39 )   PT: 13.6 sec;   INR: 1.17 ratio         PTT - ( 09 Mar 2023 08:24 )  PTT:124.9 sec       RADIOLOGY & ADDITIONAL TESTS:  Studies reviewed.

## 2023-03-09 NOTE — DIETITIAN INITIAL EVALUATION ADULT - REASON INDICATOR FOR ASSESSMENT
Nutrition Consult for assessment / education.   Source: Pt, Electronic Medical Record.   Chart reviewed, events noted.

## 2023-03-09 NOTE — DIETITIAN INITIAL EVALUATION ADULT - REASON FOR ADMISSION
Pulmonary atelectasis    Per chart: 71 y/o male with hx of nonischemic cardiomyopathy s/p HM2 LVAD, underwent OHT 2/23/18 with hepatitis C donor, hx of hemolytic anemia presented w/ 1d chest tightness, found to have new onset aflutter w/ RVR, Patient found to have human metapneumovirus with likely superimposed bacterial infection given elevated procal and CT findings of RLL and RML collapse. Course c/b elevated fungitell.

## 2023-03-09 NOTE — PROGRESS NOTE ADULT - ASSESSMENT
71y/o M w/h/o HTN, HLD, NICM, chronic systolic heart failure s/p HM2 LVAD (6/2017) s/p heart transplant from Hep. C donor (treated) 2/23/18 (post op course complicated by graft dysfunction treated by plasmapheresis, IVIG, and rituximab). No DM hx per pt report. Here w/ chest tightness x1d found to be in aflutter 2/2 pneumonia and more recently found to have Hemolytic anemia> on steroids. Endocrinology consulted for management steroid induced hyperglycemia. Tolerating POs w/BG values variable on current insulin regimen. Pt w/hypoglycemia yesterday from insulin:carb mismatch at lunch. Discussed need for consistent PO intake while on steroid therapy. Remains on Prednisone 60mg daily. BG goal (100-180mg/dl).       Met with patient and reviewed the following:    -A1c LEVEL  -Blood glucose goals  -Glucose monitoring frequency  -Hypoglycemia prevention,detection and treatment  -Insulin(s) action, time of administration and side effects  -Importance of follow up care

## 2023-03-09 NOTE — PROGRESS NOTE ADULT - PROBLEM SELECTOR PLAN 3
Home regimen: pravastatin 20mg daily  - LDL goal <70 . Pt LDL 51  - Continue atorvastatin 10mg daily while in patient  - Restart home statin upon discharge if not contraindicated.    discussed w/pt and team  Can be reached via TEAMS/pager: [phone]   office:  [phone] (M-F 9a-5pm)               [phone] (nights/weekends)   Can access Amion coverage via sunrise/tools.

## 2023-03-09 NOTE — DIETITIAN INITIAL EVALUATION ADULT - FEEDING ASSISTANCE
Provide assistance/encouragement with meals PRN to promote adequate PO intake. Encourage consistent PO intake to maintain BG levels.

## 2023-03-09 NOTE — PROGRESS NOTE ADULT - PROBLEM SELECTOR PLAN 1
P/w chest tightness x1d associated w/ dry cough; likely multifactorial in s/o atrial flutter w/ HR 140s, notes that his chest tightness is improving as HR is lowered. Also considering contribution from CT chest findings of impacted R middle and lower lobe bronchi w/ near collapse of R middle/lower lobes.   - RVP positive for human metapneumovirus   - Procal elevated to 2.39; neutrophilic predominance on diff with 1% bands in immunosuppresed   - continue abx: cefepime 1g q24h (3/4 - 2/8 ) --> adjusted for NORMAN  -EBV negative for active infection, MRSA negative  - fungitell > 300   -BCx positive for likley contaminant; f/u repeat   - treatment of aflutter as below    -Pulm consulted for possible bronch; f/u recs (aggressive Airway clearance since patient not hypoxic)  - Transplant ID following; f/u recs  - c/w ppx of bactrim and valcyclovir

## 2023-03-09 NOTE — PROGRESS NOTE ADULT - ATTENDING COMMENTS
above plans discussed with Dr. [place]    # hMPV PNA  # r/o superimposed bacterial/fungal infection  # NORMAN  # Aflutter with RVR  # AIHA on high dose steroids  # steroid induced hyperglycemia  # s/p heart transplant  # hx of C.diff colitis    - Pt initially presented chest tightness found to have AFlutter at rate 140bpm but self converted to sinus rhythm and now rate controlled  - given elevated [id number] score to 2, started on systemic AC (eliquis); fu with EP as outpatient for further management  - s/p 5 day course of cefepime, stable on RA with improvement in cough  - Bcx 1/4 with GPC, fu speciation and repeat BCx - suspect contamination  - new onset NORMAN on admission; no signs of urinary retention (bladder scan negative), likely pre-renal in setting of active infection vs. ATN from vancomycin; send UA with microscopic exam as well as renal US; overall, SCr improving continuously; likely pre-renal in setting of active infection  - prednisone dose reduced to 60mg daily  - FS now better controlled on current regimen  - tacro level at goal   - appreciate transplant cards: plan for EMBx inpatient vs. outpatient  - pt is currently stable on RA, improvement in symptoms - hold off on pulm consult for bronchoscopy at this time  - appreciate interdisciplinary team recommendations from transplant cards, ID, ethan Trevizo MD  Division of Hospital Medicine  Contact via [employer] Teams  Office: [phone]

## 2023-03-09 NOTE — PROGRESS NOTE ADULT - PROBLEM SELECTOR PLAN 4
Patient AOx1 on admission, AOx3 by time of interview though slow to respond to questions, falling asleep intermittently during exam. Considering infection-mediated vs 2/2 hyperglycemia.  RESOLVED  - CTH negative for acute pathology  - glycemic control as below; monitor for signs of DKA

## 2023-03-09 NOTE — DIETITIAN INITIAL EVALUATION ADULT - ORAL INTAKE PTA/DIET HISTORY
Pt reports typically good appetite and PO intake PTA, but endorses lack of appetite and decreased PO intake x 2 days PTA secondary to acuteness of illness. Pt reports consuming regular foods at home; confirms NKFA.

## 2023-03-09 NOTE — DIETITIAN INITIAL EVALUATION ADULT - PERSON TAUGHT/METHOD
Encouraged adequate consumption of meals to optimize protein-energy intake and maintain BG levels within normal range. Pt made aware RD remains available PRN./verbal instruction/patient instructed

## 2023-03-10 ENCOUNTER — APPOINTMENT (OUTPATIENT)
Dept: HEMATOLOGY ONCOLOGY | Facility: CLINIC | Age: 73
End: 2023-03-10

## 2023-03-10 ENCOUNTER — TRANSCRIPTION ENCOUNTER (OUTPATIENT)
Age: 73
End: 2023-03-10

## 2023-03-10 DIAGNOSIS — I48.91 UNSPECIFIED ATRIAL FIBRILLATION: ICD-10-CM

## 2023-03-10 DIAGNOSIS — R14.1 GAS PAIN: ICD-10-CM

## 2023-03-10 LAB
ANION GAP SERPL CALC-SCNC: 11 MMOL/L — SIGNIFICANT CHANGE UP (ref 5–17)
B19V DNA FLD QL NAA+PROBE: SIGNIFICANT CHANGE UP IU/ML
BLD GP AB SCN SERPL QL: POSITIVE — SIGNIFICANT CHANGE UP
BUN SERPL-MCNC: 43 MG/DL — HIGH (ref 7–23)
CALCIUM SERPL-MCNC: 7.4 MG/DL — LOW (ref 8.4–10.5)
CHLORIDE SERPL-SCNC: 105 MMOL/L — SIGNIFICANT CHANGE UP (ref 96–108)
CO2 SERPL-SCNC: 21 MMOL/L — LOW (ref 22–31)
CREAT SERPL-MCNC: 1.8 MG/DL — HIGH (ref 0.5–1.3)
DAT C3-SP REAG RBC QL: NEGATIVE — SIGNIFICANT CHANGE UP
EGFR: 40 ML/MIN/1.73M2 — LOW
GLUCOSE BLDC GLUCOMTR-MCNC: 129 MG/DL — HIGH (ref 70–99)
GLUCOSE BLDC GLUCOMTR-MCNC: 154 MG/DL — HIGH (ref 70–99)
GLUCOSE BLDC GLUCOMTR-MCNC: 172 MG/DL — HIGH (ref 70–99)
GLUCOSE BLDC GLUCOMTR-MCNC: 218 MG/DL — HIGH (ref 70–99)
GLUCOSE BLDC GLUCOMTR-MCNC: 274 MG/DL — HIGH (ref 70–99)
GLUCOSE SERPL-MCNC: 130 MG/DL — HIGH (ref 70–99)
HCT VFR BLD CALC: 33 % — LOW (ref 39–50)
HGB BLD-MCNC: 10.6 G/DL — LOW (ref 13–17)
MAGNESIUM SERPL-MCNC: 1.8 MG/DL — SIGNIFICANT CHANGE UP (ref 1.6–2.6)
MCHC RBC-ENTMCNC: 32.1 GM/DL — SIGNIFICANT CHANGE UP (ref 32–36)
MCHC RBC-ENTMCNC: 32.8 PG — SIGNIFICANT CHANGE UP (ref 27–34)
MCV RBC AUTO: 102.2 FL — HIGH (ref 80–100)
NRBC # BLD: 0 /100 WBCS — SIGNIFICANT CHANGE UP (ref 0–0)
PHOSPHATE SERPL-MCNC: 1.6 MG/DL — LOW (ref 2.5–4.5)
PLATELET # BLD AUTO: 309 K/UL — SIGNIFICANT CHANGE UP (ref 150–400)
POTASSIUM SERPL-MCNC: 4 MMOL/L — SIGNIFICANT CHANGE UP (ref 3.5–5.3)
POTASSIUM SERPL-SCNC: 4 MMOL/L — SIGNIFICANT CHANGE UP (ref 3.5–5.3)
RBC # BLD: 3.23 M/UL — LOW (ref 4.2–5.8)
RBC # FLD: 15.7 % — HIGH (ref 10.3–14.5)
RH IG SCN BLD-IMP: POSITIVE — SIGNIFICANT CHANGE UP
SODIUM SERPL-SCNC: 137 MMOL/L — SIGNIFICANT CHANGE UP (ref 135–145)
TACROLIMUS SERPL-MCNC: 3 NG/ML — SIGNIFICANT CHANGE UP
WBC # BLD: 12.68 K/UL — HIGH (ref 3.8–10.5)
WBC # FLD AUTO: 12.68 K/UL — HIGH (ref 3.8–10.5)

## 2023-03-10 PROCEDURE — 99233 SBSQ HOSP IP/OBS HIGH 50: CPT

## 2023-03-10 PROCEDURE — 99232 SBSQ HOSP IP/OBS MODERATE 35: CPT | Mod: GC

## 2023-03-10 RX ORDER — DILTIAZEM HYDROCHLORIDE 120 MG/1
120 CAPSULE, EXTENDED RELEASE ORAL
Qty: 30 | Refills: 5 | Status: DISCONTINUED | COMMUNITY
Start: 2023-03-10 | End: 2023-03-10

## 2023-03-10 RX ORDER — INSULIN GLARGINE 100 [IU]/ML
14 INJECTION, SOLUTION SUBCUTANEOUS
Qty: 4 | Refills: 0
Start: 2023-03-10 | End: 2023-04-08

## 2023-03-10 RX ORDER — BLOOD-GLUCOSE METER
W/DEVICE KIT MISCELLANEOUS
Qty: 1 | Refills: 0 | Status: ACTIVE | COMMUNITY
Start: 2023-03-10 | End: 1900-01-01

## 2023-03-10 RX ORDER — TACROLIMUS 5 MG/1
0.5 CAPSULE ORAL
Refills: 0 | Status: DISCONTINUED | OUTPATIENT
Start: 2023-03-11 | End: 2023-03-11

## 2023-03-10 RX ORDER — INSULIN LISPRO 100/ML
14 VIAL (ML) SUBCUTANEOUS
Qty: 8 | Refills: 0
Start: 2023-03-10 | End: 2023-04-08

## 2023-03-10 RX ORDER — INSULIN LISPRO 100/ML
4 VIAL (ML) SUBCUTANEOUS
Qty: 3 | Refills: 0
Start: 2023-03-10 | End: 2023-04-08

## 2023-03-10 RX ORDER — DILTIAZEM HCL 120 MG
120 CAPSULE, EXT RELEASE 24 HR ORAL EVERY 24 HOURS
Refills: 0 | Status: DISCONTINUED | OUTPATIENT
Start: 2023-03-10 | End: 2023-03-10

## 2023-03-10 RX ORDER — PATIROMER 16.8 G/1
16.8 POWDER, FOR SUSPENSION ORAL TWICE DAILY
Qty: 60 | Refills: 5 | Status: DISCONTINUED | COMMUNITY
Start: 2023-01-27 | End: 2023-03-10

## 2023-03-10 RX ORDER — TACROLIMUS 1 MG/1
1 CAPSULE ORAL TWICE DAILY
Qty: 280 | Refills: 5 | Status: DISCONTINUED | COMMUNITY
Start: 2022-04-05 | End: 2023-03-10

## 2023-03-10 RX ORDER — SODIUM,POTASSIUM PHOSPHATES 278-250MG
1 POWDER IN PACKET (EA) ORAL ONCE
Refills: 0 | Status: COMPLETED | OUTPATIENT
Start: 2023-03-10 | End: 2023-03-10

## 2023-03-10 RX ORDER — LOSARTAN POTASSIUM 100 MG/1
100 TABLET, FILM COATED ORAL
Qty: 1 | Refills: 5 | Status: DISCONTINUED | COMMUNITY
Start: 2021-06-21 | End: 2023-03-10

## 2023-03-10 RX ORDER — TACROLIMUS 5 MG/1
1 CAPSULE ORAL
Refills: 0 | Status: DISCONTINUED | OUTPATIENT
Start: 2023-03-10 | End: 2023-03-11

## 2023-03-10 RX ORDER — CHOLECALCIFEROL (VITAMIN D3) 25 MCG
25 MCG TABLET ORAL DAILY
Qty: 30 | Refills: 5 | Status: DISCONTINUED | COMMUNITY
Start: 2020-06-01 | End: 2023-03-10

## 2023-03-10 RX ADMIN — Medication 4 UNIT(S): at 17:35

## 2023-03-10 RX ADMIN — Medication 500000 UNIT(S): at 16:44

## 2023-03-10 RX ADMIN — Medication 60 MILLIGRAM(S): at 06:10

## 2023-03-10 RX ADMIN — Medication 200 MILLIGRAM(S): at 06:09

## 2023-03-10 RX ADMIN — Medication 200 MILLIGRAM(S): at 00:04

## 2023-03-10 RX ADMIN — Medication 1 MILLIGRAM(S): at 12:37

## 2023-03-10 RX ADMIN — Medication 500000 UNIT(S): at 12:35

## 2023-03-10 RX ADMIN — INSULIN GLARGINE 20 UNIT(S): 100 INJECTION, SOLUTION SUBCUTANEOUS at 22:36

## 2023-03-10 RX ADMIN — SODIUM CHLORIDE 4 MILLILITER(S): 9 INJECTION INTRAMUSCULAR; INTRAVENOUS; SUBCUTANEOUS at 06:10

## 2023-03-10 RX ADMIN — Medication 500000 UNIT(S): at 21:35

## 2023-03-10 RX ADMIN — APIXABAN 5 MILLIGRAM(S): 2.5 TABLET, FILM COATED ORAL at 17:37

## 2023-03-10 RX ADMIN — Medication 200 MILLIGRAM(S): at 06:10

## 2023-03-10 RX ADMIN — ATORVASTATIN CALCIUM 10 MILLIGRAM(S): 80 TABLET, FILM COATED ORAL at 21:34

## 2023-03-10 RX ADMIN — Medication 81 MILLIGRAM(S): at 12:37

## 2023-03-10 RX ADMIN — SODIUM CHLORIDE 4 MILLILITER(S): 9 INJECTION INTRAMUSCULAR; INTRAVENOUS; SUBCUTANEOUS at 17:39

## 2023-03-10 RX ADMIN — TACROLIMUS 0.5 MILLIGRAM(S): 5 CAPSULE ORAL at 08:48

## 2023-03-10 RX ADMIN — Medication 14 UNIT(S): at 08:49

## 2023-03-10 RX ADMIN — Medication 200 MILLIGRAM(S): at 17:36

## 2023-03-10 RX ADMIN — APIXABAN 5 MILLIGRAM(S): 2.5 TABLET, FILM COATED ORAL at 06:10

## 2023-03-10 RX ADMIN — PANTOPRAZOLE SODIUM 40 MILLIGRAM(S): 20 TABLET, DELAYED RELEASE ORAL at 06:11

## 2023-03-10 RX ADMIN — Medication 125 MILLIGRAM(S): at 06:10

## 2023-03-10 RX ADMIN — Medication 30 MILLIGRAM(S): at 06:09

## 2023-03-10 RX ADMIN — Medication 4 UNIT(S): at 13:29

## 2023-03-10 RX ADMIN — Medication 6: at 13:29

## 2023-03-10 RX ADMIN — Medication 500000 UNIT(S): at 08:48

## 2023-03-10 RX ADMIN — Medication 650 MILLIGRAM(S): at 12:36

## 2023-03-10 RX ADMIN — TACROLIMUS 1 MILLIGRAM(S): 5 CAPSULE ORAL at 21:40

## 2023-03-10 RX ADMIN — Medication 200 MILLIGRAM(S): at 12:37

## 2023-03-10 RX ADMIN — Medication 3 MILLILITER(S): at 00:04

## 2023-03-10 RX ADMIN — Medication 3 MILLILITER(S): at 06:11

## 2023-03-10 RX ADMIN — Medication 30 MILLIGRAM(S): at 00:04

## 2023-03-10 RX ADMIN — Medication 2: at 17:34

## 2023-03-10 RX ADMIN — Medication 1 PACKET(S): at 17:38

## 2023-03-10 RX ADMIN — Medication 3 MILLILITER(S): at 12:39

## 2023-03-10 RX ADMIN — SIMETHICONE 80 MILLIGRAM(S): 80 TABLET, CHEWABLE ORAL at 12:36

## 2023-03-10 RX ADMIN — Medication 1 TABLET(S): at 22:38

## 2023-03-10 RX ADMIN — Medication 650 MILLIGRAM(S): at 13:38

## 2023-03-10 RX ADMIN — Medication 3 MILLILITER(S): at 17:39

## 2023-03-10 RX ADMIN — SENNA PLUS 1 TABLET(S): 8.6 TABLET ORAL at 12:38

## 2023-03-10 NOTE — DISCHARGE NOTE NURSING/CASE MANAGEMENT/SOCIAL WORK - PATIENT PORTAL LINK FT
You can access the FollowMyHealth Patient Portal offered by [facility] by registering at the following website: [email or web address]. By joining [employer]’s FollowMyHealth portal, you will also be able to view your health information using other applications (apps) compatible with our system.

## 2023-03-10 NOTE — PROGRESS NOTE ADULT - PROBLEM SELECTOR PLAN 2
- obtain tacro level 30min prior to administration in AM  - dose tac for goal 6-8  - transplant ID consult  - pulm consult for bronch but not indicated per them  - restart home bactrim ppx  - con't valganciclovir ppx  - c/w antifungal

## 2023-03-10 NOTE — PROGRESS NOTE ADULT - PROBLEM SELECTOR PLAN 2
Admitted w/ HR 140s, improved after giving home dose 200mg metoprolol succinate. EP consulted and determined that patient has new-onset A-flutter. TTE done, showed EF 50-55%, concentric LV remodeling, grossly normal LV and RV systolic function. Possible compensatory response 2/2 underlying infection as above.   Patient now in sinus tachycadia  - EP recs appreciated; no need for cardioversion at this time  - continue home metoprolol succinate 200mg qd; added diltiazem 30 Q6h for persistent tachycardia --> will transition to extended release upon discharge after uptitration of diltiazem   - CHADSVASC 2; start anticoagulation w/ eliquis 5mg BID --> transitioned to heparin gtt for biopsy   - Pending endomyocardial biopsy on 3/8 to rule out rejection

## 2023-03-10 NOTE — PROGRESS NOTE ADULT - ASSESSMENT
71 y/o male with hx of nonischemic cardiomyopathy s/p HM2 LVAD, underwent OHT 2/23/18 with hepatitis C donor, hx of hemolytic anemia presented w/ 1d chest tightness, found to have new onset aflutter w/ RVR, Patient found to have human metapneumovirus with likely superimposed bacterial infection given elevated procal and CT findings of RLL and RML collapse. Course c/b elevated fungitell. Pending endomyocardial biopsy to rule out rejection.  71 y/o male with hx of nonischemic cardiomyopathy s/p HM2 LVAD, underwent OHT 2/23/18 with hepatitis C donor, hx of hemolytic anemia presented w/ 1d chest tightness, found to have new onset aflutter w/ RVR, Patient found to have human metapneumovirus with likely superimposed bacterial infection given elevated procal and CT findings of RLL and RML collapse. Course c/b elevated fungitell however stable on room air.

## 2023-03-10 NOTE — PROGRESS NOTE ADULT - PROBLEM SELECTOR PLAN 9
Followed by [facility]red as outpatient; admitted 6.5 in Jan 2023, treated w/ 4d IV dexamethasone.   Per hematology, recommended to avoid blood transfusion unless patient is symptomatic due to risk of hyperhemolysis. Transfusion is okay from heart transplant perspective however per blood bank It will need to be emergent.   - maintain active T/S  - Hematology consulted for help with steroid management in setting of acute infection; to be discharge on new dose of prednisone 60 mg   - labs not concerning for hemolysis

## 2023-03-10 NOTE — PROGRESS NOTE ADULT - PROBLEM SELECTOR PLAN 5
Prednisone-induced hyperglycemia w/ blood glucose elevated to 400-500 on admission. Based on outpatient records, patient has been hyperglycemic in the past, last A1c 4.7; not currently on any JAVIER or insulin. Not given any insulin in ED.   - BHB wnl, anion gap wnl  - given 5u regular insulin;  at the time  - increase standing insulin based on 24h requirements  - started ISS, hypoglycemia protocol  - endocrine consulted for hyperglycemia  - started on lantus 18 units and admelog 7 units TID per endocrine however had episodes of hypoglycemia --> transitioned to admelog 10 - 6 - 4 Prednisone-induced hyperglycemia w/ blood glucose elevated to 400-500 on admission. Based on outpatient records, patient has been hyperglycemic in the past, last A1c 4.7; not currently on any JAVIER or insulin. Not given any insulin in ED.   - BHB wnl, anion gap wnl  - given 5u regular insulin;  at the time  - increase standing insulin based on 24h requirements  - started ISS, hypoglycemia protocol  - endocrine consulted for hyperglycemia  - started on lantus 18 units and admelog 7 units TID per endocrine however had episodes of hypoglycemia --> transitioned to admelog 14 - 4 - 4

## 2023-03-10 NOTE — PROGRESS NOTE ADULT - SUBJECTIVE AND OBJECTIVE BOX
INTERVAL HPI/OVERNIGHT EVENTS:    SUBJECTIVE: Patient seen and examined at bedside.    OBJECTIVE:    VITAL SIGNS:  ICU Vital Signs Last 24 Hrs  T(C): 36.6 (10 Mar 2023 04:57), Max: 36.6 (09 Mar 2023 21:10)  T(F): 97.9 (10 Mar 2023 04:57), Max: 97.9 (10 Mar 2023 04:57)  HR: 97 (10 Mar 2023 04:57) (96 - 101)  BP: 139/94 (10 Mar 2023 04:57) (118/86 - 139/94)  BP(mean): 97 (09 Mar 2023 12:12) (97 - 97)  ABP: --  ABP(mean): --  RR: 18 (10 Mar 2023 04:57) (18 - 18)  SpO2: 99% (10 Mar 2023 04:57) (98% - 100%)    O2 Parameters below as of 10 Mar 2023 04:57  Patient On (Oxygen Delivery Method): room air              03-09 @ 07:01  -  03-10 @ 07:00  --------------------------------------------------------  IN: 560 mL / OUT: 200 mL / NET: 360 mL      CAPILLARY BLOOD GLUCOSE      POCT Blood Glucose.: 172 mg/dL (10 Mar 2023 04:22)      PHYSICAL EXAM:    General: NAD  HEENT: NC/AT; PERRL, clear conjunctiva  Neck: supple  Respiratory: CTA b/l  Cardiovascular: +S1/S2; RRR  Abdomen: soft, NT/ND; +BS x4  Extremities:  no LE edema  Vascular: WWP, 2+ peripheral pulses b/l;  Skin: normal color and turgor; no rash  Neurological: A&Ox3, move all extremities. CN II-XII intact    MEDICATIONS:  MEDICATIONS  (STANDING):  albuterol/ipratropium for Nebulization 3 milliLiter(s) Nebulizer every 6 hours  apixaban 5 milliGRAM(s) Oral every 12 hours  aspirin enteric coated 81 milliGRAM(s) Oral daily  atorvastatin 10 milliGRAM(s) Oral at bedtime  dextrose 5%. 1000 milliLiter(s) (50 mL/Hr) IV Continuous <Continuous>  dextrose 5%. 1000 milliLiter(s) (100 mL/Hr) IV Continuous <Continuous>  dextrose 50% Injectable 25 Gram(s) IV Push once  dextrose 50% Injectable 12.5 Gram(s) IV Push once  dextrose 50% Injectable 25 Gram(s) IV Push once  dextrose Oral Gel 15 Gram(s) Oral once  diltiazem    Tablet 30 milliGRAM(s) Oral every 6 hours  folic acid 1 milliGRAM(s) Oral daily  glucagon  Injectable 1 milliGRAM(s) IntraMuscular once  guaiFENesin Oral Liquid (Sugar-Free) 200 milliGRAM(s) Oral every 6 hours  insulin glargine Injectable (LANTUS) 20 Unit(s) SubCutaneous at bedtime  insulin lispro (ADMELOG) corrective regimen sliding scale   SubCutaneous three times a day before meals  insulin lispro (ADMELOG) corrective regimen sliding scale   SubCutaneous <User Schedule>  insulin lispro Injectable (ADMELOG) 4 Unit(s) SubCutaneous before dinner  insulin lispro Injectable (ADMELOG) 14 Unit(s) SubCutaneous before breakfast  insulin lispro Injectable (ADMELOG) 4 Unit(s) SubCutaneous before lunch  metoprolol succinate  milliGRAM(s) Oral daily  nystatin    Suspension 089250 Unit(s) Oral <User Schedule>  pantoprazole    Tablet 40 milliGRAM(s) Oral before breakfast  polyethylene glycol 3350 17 Gram(s) Oral daily  predniSONE   Tablet 60 milliGRAM(s) Oral daily  senna 1 Tablet(s) Oral daily  simethicone 80 milliGRAM(s) Chew daily  sodium chloride 3%  Inhalation 4 milliLiter(s) Inhalation every 12 hours  tacrolimus 0.5 milliGRAM(s) Oral <User Schedule>  trimethoprim   80 mG/sulfamethoxazole 400 mG 1 Tablet(s) Oral every 48 hours  valGANciclovir 450 milliGRAM(s) Oral <User Schedule>  vancomycin    Solution 125 milliGRAM(s) Oral every 12 hours    MEDICATIONS  (PRN):  acetaminophen     Tablet .. 650 milliGRAM(s) Oral every 6 hours PRN Temp greater or equal to 38C (100.4F), Mild Pain (1 - 3)  melatonin 3 milliGRAM(s) Oral at bedtime PRN Insomnia      ALLERGIES:  Allergies    No Known Allergies    Intolerances        LABS:                        10.2   10.30 )-----------( 238      ( 09 Mar 2023 08:24 )             31.8     03-09    138  |  108  |  50<H>  ----------------------------<  145<H>  4.0   |  20<L>  |  1.80<H>    Ca    7.5<L>      09 Mar 2023 08:24  Phos  2.0     03-09  Mg     1.9     03-09      PTT - ( 09 Mar 2023 08:24 )  PTT:124.9 sec      RADIOLOGY & ADDITIONAL TESTS: Reviewed.

## 2023-03-10 NOTE — PROGRESS NOTE ADULT - SUBJECTIVE AND OBJECTIVE BOX
Interval History:  disappointed about not being able to go home today  feels otherwise well     Medications:  acetaminophen     Tablet .. 650 milliGRAM(s) Oral every 6 hours PRN  albuterol/ipratropium for Nebulization 3 milliLiter(s) Nebulizer every 6 hours  apixaban 5 milliGRAM(s) Oral every 12 hours  aspirin enteric coated 81 milliGRAM(s) Oral daily  atorvastatin 10 milliGRAM(s) Oral at bedtime  dextrose 5%. 1000 milliLiter(s) IV Continuous <Continuous>  dextrose 5%. 1000 milliLiter(s) IV Continuous <Continuous>  dextrose 50% Injectable 25 Gram(s) IV Push once  dextrose 50% Injectable 12.5 Gram(s) IV Push once  dextrose 50% Injectable 25 Gram(s) IV Push once  dextrose Oral Gel 15 Gram(s) Oral once  folic acid 1 milliGRAM(s) Oral daily  glucagon  Injectable 1 milliGRAM(s) IntraMuscular once  guaiFENesin Oral Liquid (Sugar-Free) 200 milliGRAM(s) Oral every 6 hours  insulin glargine Injectable (LANTUS) 20 Unit(s) SubCutaneous at bedtime  insulin lispro (ADMELOG) corrective regimen sliding scale   SubCutaneous <User Schedule>  insulin lispro (ADMELOG) corrective regimen sliding scale   SubCutaneous three times a day before meals  insulin lispro Injectable (ADMELOG) 4 Unit(s) SubCutaneous before dinner  insulin lispro Injectable (ADMELOG) 14 Unit(s) SubCutaneous before breakfast  insulin lispro Injectable (ADMELOG) 4 Unit(s) SubCutaneous before lunch  melatonin 3 milliGRAM(s) Oral at bedtime PRN  metoprolol succinate  milliGRAM(s) Oral daily  nystatin    Suspension 043056 Unit(s) Oral <User Schedule>  pantoprazole    Tablet 40 milliGRAM(s) Oral before breakfast  polyethylene glycol 3350 17 Gram(s) Oral daily  predniSONE   Tablet 60 milliGRAM(s) Oral daily  senna 1 Tablet(s) Oral daily  simethicone 80 milliGRAM(s) Chew daily  sodium chloride 3%  Inhalation 4 milliLiter(s) Inhalation every 12 hours  tacrolimus 1 milliGRAM(s) Oral <User Schedule>  trimethoprim   80 mG/sulfamethoxazole 400 mG 1 Tablet(s) Oral every 48 hours  valGANciclovir 450 milliGRAM(s) Oral <User Schedule>      Vitals:  T(C): 36.4 (03-10-23 @ 21:14), Max: 36.6 (03-10-23 @ 04:57)  HR: 94 (03-10-23 @ 21:14) (93 - 97)  BP: 118/79 (03-10-23 @ 21:14) (118/79 - 139/94)  BP(mean): --  RR: 18 (03-10-23 @ 21:14) (18 - 18)  SpO2: 97% (03-10-23 @ 21:14) (97% - 99%)    Daily     Daily         I&O's Summary    09 Mar 2023 07:01  -  10 Mar 2023 07:00  --------------------------------------------------------  IN: 620 mL / OUT: 200 mL / NET: 420 mL    10 Mar 2023 07:01  -  10 Mar 2023 23:21  --------------------------------------------------------  IN: 480 mL / OUT: 0 mL / NET: 480 mL        Physical Exam:  Appearance: No Acute Distress; cushingoid appearance  HEENT: PERRL  Neck: No JVD  Cardiovascular: Normal S1 S2, No murmurs/rubs/gallops  Respiratory: Clear to auscultation bilaterally  Gastrointestinal: Soft, Non-tender	  Skin: No cyanosis	  Neurologic: Non-focal  Extremities: No LE edema  Psychiatry: A & O x 3, Mood & affect appropriate    Labs:                        10.6   12.68 )-----------( 309      ( 10 Mar 2023 09:08 )             33.0     03-10    137  |  105  |  43<H>  ----------------------------<  130<H>  4.0   |  21<L>  |  1.80<H>    Ca    7.4<L>      10 Mar 2023 09:08  Phos  1.6     03-10  Mg     1.8     03-10      PTT - ( 09 Mar 2023 08:24 )  PTT:124.9 sec        TELEMETRY:    Echocardiogram:

## 2023-03-10 NOTE — PROGRESS NOTE ADULT - PROBLEM SELECTOR PLAN 2
Admitted w/ HR 140s, improved after giving home dose 200mg metoprolol succinate. EP consulted and determined that patient has new-onset A-flutter. TTE done, showed EF 50-55%, concentric LV remodeling, grossly normal LV and RV systolic function. Possible compensatory response 2/2 underlying infection as above.   Patient now in sinus tachycadia  - EP recs appreciated; no need for cardioversion at this time  - continue home metoprolol succinate 200mg qd; added diltiazem 30 Q6h for persistent tachycardia --> will transition to extended release upon discharge after uptitration of diltiazem   - CHADSVASC 2; start anticoagulation w/ eliquis 5mg BID --> transitioned to heparin gtt for biopsy   - can trial lopressor push if HR >120s, then trial diltiazem push and transition to dilt drip  - Pending endomyocardial biopsy on 3/8 to rule out rejection Admitted w/ HR 140s, improved after giving home dose 200mg metoprolol succinate. EP consulted and determined that patient has new-onset A-flutter. TTE done, showed EF 50-55%, concentric LV remodeling, grossly normal LV and RV systolic function. Possible compensatory response 2/2 underlying infection as above.   Patient now in sinus tachycadia  - EP recs appreciated; no need for cardioversion at this time  - continue home metoprolol succinate 200mg qd; added diltiazem 30 Q6h for persistent tachycardia --> will transition to extended release upon discharge after uptitration of diltiazem   - CHADSVASC 2; start anticoagulation w/ eliquis 5mg BID --> transitioned to heparin gtt for biopsy   - Pending endomyocardial biopsy on 3/8 to rule out rejection

## 2023-03-10 NOTE — PROGRESS NOTE ADULT - ASSESSMENT
71 y/o male with hx of nonischemic cardiomyopathy s/p HM2 LVAD, underwent OHT 2/23/18 with hepatitis C donor, hx of hemolytic anemia presented w/ 1d chest tightness, found to have new onset aflutter w/ RVR, Patient found to have human metapneumovirus with likely superimposed bacterial infection given elevated procal and CT findings of RLL and RML collapse. Course c/b elevated fungitell however stable on room air.

## 2023-03-10 NOTE — DISCHARGE NOTE NURSING/CASE MANAGEMENT/SOCIAL WORK - NSDCVIVACCINE_GEN_ALL_CORE_FT
Hep A, adult; 07-Feb-2018 09:02; Kathleen Reyes (RN); [employer]; 7439F; IntraMuscular; Deltoid Right.; 1 milliLiter(s); VIS (VIS Published: 20-Jul-2017, VIS Presented: 07-Feb-2018);   Hep B, adult; 07-Feb-2018 09:09; Kathleen Reyes (RN); [employer]; B39CM; IntraMuscular; Deltoid Left.; 1 milliLiter(s); VIS (VIS Published: 20-Jul-2016, VIS Presented: 07-Feb-2018);   Hep B, adult; 16-Feb-2018 19:00; Brooklynn Coles ([facility]); [employer]; B39CM; IntraMuscular; Deltoid Right.; 1 milliLiter(s); VIS (VIS Published: 20-Jul-2016, VIS Presented: 16-Feb-2018);   influenza, injectable, quadrivalent, preservative free; 02-Oct-2020 12:07; Enriqueta Hassan ([facility]); [employer]; [id number] (Exp. Date: 30-Jun-2021); IntraMuscular; Deltoid Right.; 0.5 milliLiter(s); VIS (VIS Published: 15-Aug-2019, VIS Presented: 02-Oct-2020);

## 2023-03-10 NOTE — PROGRESS NOTE ADULT - PROBLEM SELECTOR PLAN 4
- on last admission it was stated by heme to avoid blood transfusion unless he is significantly symptomatic due to risk of hyperhemolysis. Of note if a transfusion is needed it is ok from heart transplant perspective.  - appreciate heme/onc consult for hemolytic anemia management and prednisone dosing given concern for oppurtunistic infection  - currently on pred 60 mg daily   - needs insulin teaching

## 2023-03-10 NOTE — PROGRESS NOTE ADULT - SUBJECTIVE AND OBJECTIVE BOX
INTERVAL HPI/OVERNIGHT EVENTS:    SUBJECTIVE: Patient seen and examined at bedside.    OBJECTIVE:    VITAL SIGNS:  ICU Vital Signs Last 24 Hrs  T(C): 36.6 (10 Mar 2023 04:57), Max: 36.6 (09 Mar 2023 21:10)  T(F): 97.9 (10 Mar 2023 04:57), Max: 97.9 (10 Mar 2023 04:57)  HR: 97 (10 Mar 2023 04:57) (96 - 101)  BP: 139/94 (10 Mar 2023 04:57) (118/86 - 139/94)  BP(mean): 97 (09 Mar 2023 12:12) (97 - 97)  ABP: --  ABP(mean): --  RR: 18 (10 Mar 2023 04:57) (18 - 18)  SpO2: 99% (10 Mar 2023 04:57) (98% - 100%)    O2 Parameters below as of 10 Mar 2023 04:57  Patient On (Oxygen Delivery Method): room air              03-09 @ 07:01  -  03-10 @ 07:00  --------------------------------------------------------  IN: 560 mL / OUT: 200 mL / NET: 360 mL      CAPILLARY BLOOD GLUCOSE      POCT Blood Glucose.: 172 mg/dL (10 Mar 2023 04:22)      PHYSICAL EXAM:    General: NAD  HEENT: NC/AT; PERRL, clear conjunctiva  Neck: supple  Respiratory: CTA b/l  Cardiovascular: +S1/S2; RRR  Abdomen: soft, NT/ND; +BS x4  Extremities:  no LE edema  Vascular: WWP, 2+ peripheral pulses b/l;  Skin: normal color and turgor; no rash  Neurological: A&Ox3, move all extremities. CN II-XII intact    MEDICATIONS:  MEDICATIONS  (STANDING):  albuterol/ipratropium for Nebulization 3 milliLiter(s) Nebulizer every 6 hours  apixaban 5 milliGRAM(s) Oral every 12 hours  aspirin enteric coated 81 milliGRAM(s) Oral daily  atorvastatin 10 milliGRAM(s) Oral at bedtime  dextrose 5%. 1000 milliLiter(s) (50 mL/Hr) IV Continuous <Continuous>  dextrose 5%. 1000 milliLiter(s) (100 mL/Hr) IV Continuous <Continuous>  dextrose 50% Injectable 25 Gram(s) IV Push once  dextrose 50% Injectable 12.5 Gram(s) IV Push once  dextrose 50% Injectable 25 Gram(s) IV Push once  dextrose Oral Gel 15 Gram(s) Oral once  diltiazem    Tablet 30 milliGRAM(s) Oral every 6 hours  folic acid 1 milliGRAM(s) Oral daily  glucagon  Injectable 1 milliGRAM(s) IntraMuscular once  guaiFENesin Oral Liquid (Sugar-Free) 200 milliGRAM(s) Oral every 6 hours  insulin glargine Injectable (LANTUS) 20 Unit(s) SubCutaneous at bedtime  insulin lispro (ADMELOG) corrective regimen sliding scale   SubCutaneous <User Schedule>  insulin lispro (ADMELOG) corrective regimen sliding scale   SubCutaneous three times a day before meals  insulin lispro Injectable (ADMELOG) 4 Unit(s) SubCutaneous before dinner  insulin lispro Injectable (ADMELOG) 14 Unit(s) SubCutaneous before breakfast  insulin lispro Injectable (ADMELOG) 4 Unit(s) SubCutaneous before lunch  metoprolol succinate  milliGRAM(s) Oral daily  nystatin    Suspension 201285 Unit(s) Oral <User Schedule>  pantoprazole    Tablet 40 milliGRAM(s) Oral before breakfast  polyethylene glycol 3350 17 Gram(s) Oral daily  predniSONE   Tablet 60 milliGRAM(s) Oral daily  senna 1 Tablet(s) Oral daily  simethicone 80 milliGRAM(s) Chew daily  sodium chloride 3%  Inhalation 4 milliLiter(s) Inhalation every 12 hours  tacrolimus 0.5 milliGRAM(s) Oral <User Schedule>  trimethoprim   80 mG/sulfamethoxazole 400 mG 1 Tablet(s) Oral every 48 hours  valGANciclovir 450 milliGRAM(s) Oral <User Schedule>  vancomycin    Solution 125 milliGRAM(s) Oral every 12 hours    MEDICATIONS  (PRN):  acetaminophen     Tablet .. 650 milliGRAM(s) Oral every 6 hours PRN Temp greater or equal to 38C (100.4F), Mild Pain (1 - 3)  melatonin 3 milliGRAM(s) Oral at bedtime PRN Insomnia      ALLERGIES:  Allergies    No Known Allergies    Intolerances        LABS:                        10.2   10.30 )-----------( 238      ( 09 Mar 2023 08:24 )             31.8     03-09    138  |  108  |  50<H>  ----------------------------<  145<H>  4.0   |  20<L>  |  1.80<H>    Ca    7.5<L>      09 Mar 2023 08:24  Phos  2.0     03-09  Mg     1.9     03-09      PTT - ( 09 Mar 2023 08:24 )  PTT:124.9 sec      RADIOLOGY & ADDITIONAL TESTS: Reviewed. INTERVAL HPI/OVERNIGHT EVENTS: KEVIN OVN     SUBJECTIVE: Patient seen and examined at bedside. This AM patient states he is feeling well with no complaints.     OBJECTIVE:    VITAL SIGNS:  ICU Vital Signs Last 24 Hrs  T(C): 36.6 (10 Mar 2023 04:57), Max: 36.6 (09 Mar 2023 21:10)  T(F): 97.9 (10 Mar 2023 04:57), Max: 97.9 (10 Mar 2023 04:57)  HR: 97 (10 Mar 2023 04:57) (96 - 101)  BP: 139/94 (10 Mar 2023 04:57) (118/86 - 139/94)  BP(mean): 97 (09 Mar 2023 12:12) (97 - 97)  ABP: --  ABP(mean): --  RR: 18 (10 Mar 2023 04:57) (18 - 18)  SpO2: 99% (10 Mar 2023 04:57) (98% - 100%)    O2 Parameters below as of 10 Mar 2023 04:57  Patient On (Oxygen Delivery Method): room air              03-09 @ 07:01  -  03-10 @ 07:00  --------------------------------------------------------  IN: 560 mL / OUT: 200 mL / NET: 360 mL      CAPILLARY BLOOD GLUCOSE      POCT Blood Glucose.: 172 mg/dL (10 Mar 2023 04:22)      PHYSICAL EXAM:    General: NAD  HEENT: NC/AT; PERRL, clear conjunctiva  Neck: supple  Respiratory: CTA b/l  Cardiovascular: +S1/S2; RRR  Abdomen: soft, NT/ND; +BS x4  Extremities:  no LE edema  Vascular: WWP, 2+ peripheral pulses b/l;  Skin: normal color and turgor; no rash  Neurological: A&Ox3, move all extremities. CN II-XII intact    MEDICATIONS:  MEDICATIONS  (STANDING):  albuterol/ipratropium for Nebulization 3 milliLiter(s) Nebulizer every 6 hours  apixaban 5 milliGRAM(s) Oral every 12 hours  aspirin enteric coated 81 milliGRAM(s) Oral daily  atorvastatin 10 milliGRAM(s) Oral at bedtime  dextrose 5%. 1000 milliLiter(s) (50 mL/Hr) IV Continuous <Continuous>  dextrose 5%. 1000 milliLiter(s) (100 mL/Hr) IV Continuous <Continuous>  dextrose 50% Injectable 25 Gram(s) IV Push once  dextrose 50% Injectable 12.5 Gram(s) IV Push once  dextrose 50% Injectable 25 Gram(s) IV Push once  dextrose Oral Gel 15 Gram(s) Oral once  diltiazem    Tablet 30 milliGRAM(s) Oral every 6 hours  folic acid 1 milliGRAM(s) Oral daily  glucagon  Injectable 1 milliGRAM(s) IntraMuscular once  guaiFENesin Oral Liquid (Sugar-Free) 200 milliGRAM(s) Oral every 6 hours  insulin glargine Injectable (LANTUS) 20 Unit(s) SubCutaneous at bedtime  insulin lispro (ADMELOG) corrective regimen sliding scale   SubCutaneous <User Schedule>  insulin lispro (ADMELOG) corrective regimen sliding scale   SubCutaneous three times a day before meals  insulin lispro Injectable (ADMELOG) 4 Unit(s) SubCutaneous before dinner  insulin lispro Injectable (ADMELOG) 14 Unit(s) SubCutaneous before breakfast  insulin lispro Injectable (ADMELOG) 4 Unit(s) SubCutaneous before lunch  metoprolol succinate  milliGRAM(s) Oral daily  nystatin    Suspension 808289 Unit(s) Oral <User Schedule>  pantoprazole    Tablet 40 milliGRAM(s) Oral before breakfast  polyethylene glycol 3350 17 Gram(s) Oral daily  predniSONE   Tablet 60 milliGRAM(s) Oral daily  senna 1 Tablet(s) Oral daily  simethicone 80 milliGRAM(s) Chew daily  sodium chloride 3%  Inhalation 4 milliLiter(s) Inhalation every 12 hours  tacrolimus 0.5 milliGRAM(s) Oral <User Schedule>  trimethoprim   80 mG/sulfamethoxazole 400 mG 1 Tablet(s) Oral every 48 hours  valGANciclovir 450 milliGRAM(s) Oral <User Schedule>  vancomycin    Solution 125 milliGRAM(s) Oral every 12 hours    MEDICATIONS  (PRN):  acetaminophen     Tablet .. 650 milliGRAM(s) Oral every 6 hours PRN Temp greater or equal to 38C (100.4F), Mild Pain (1 - 3)  melatonin 3 milliGRAM(s) Oral at bedtime PRN Insomnia      ALLERGIES:  Allergies    No Known Allergies    Intolerances        LABS:                        10.2   10.30 )-----------( 238      ( 09 Mar 2023 08:24 )             31.8     03-09    138  |  108  |  50<H>  ----------------------------<  145<H>  4.0   |  20<L>  |  1.80<H>    Ca    7.5<L>      09 Mar 2023 08:24  Phos  2.0     03-09  Mg     1.9     03-09      PTT - ( 09 Mar 2023 08:24 )  PTT:124.9 sec      RADIOLOGY & ADDITIONAL TESTS: Reviewed.

## 2023-03-10 NOTE — PROGRESS NOTE ADULT - PROBLEM SELECTOR PLAN 1
- s/p OHT in 2/23/18  - continue home Toprol  mg PO QD; losartan on hold d/t renal dysfunction   - continue home ASA 81 and  pravastatin 20 mg PO QD  - continue home pantoprazole 40 mg PO QD    - was planned for repeat EMBx but unlikely rejection so defer  - d/c diltiazem as tachycardia is common post-transplant

## 2023-03-10 NOTE — PROGRESS NOTE ADULT - PROBLEM SELECTOR PLAN 9
Followed by jarred as outpatient; admitted 6.5 in Jan 2023, treated w/ 4d IV dexamethasone.   Per hematology, recommended to avoid blood transfusion unless patient is symptomatic due to risk of hyperhemolysis. Transfusion is okay from heart transplant perspective however per blood bank It will need to be emergent.   - maintain active T/S  - Hematology consulted for help with steroid management in setting of acute infection; f/u recs  - labs not concerning for hemolysis Followed by [facility]red as outpatient; admitted 6.5 in Jan 2023, treated w/ 4d IV dexamethasone.   Per hematology, recommended to avoid blood transfusion unless patient is symptomatic due to risk of hyperhemolysis. Transfusion is okay from heart transplant perspective however per blood bank It will need to be emergent.   - maintain active T/S  - Hematology consulted for help with steroid management in setting of acute infection; to be discharge on new dose of prednisone 60 mg   - labs not concerning for hemolysis

## 2023-03-10 NOTE — PROGRESS NOTE ADULT - PROBLEM SELECTOR PLAN 5
Prednisone-induced hyperglycemia w/ blood glucose elevated to 400-500 on admission. Based on outpatient records, patient has been hyperglycemic in the past, last A1c 4.7; not currently on any JAVIER or insulin. Not given any insulin in ED.   - BHB wnl, anion gap wnl  - given 5u regular insulin;  at the time  - increase standing insulin based on 24h requirements  - started ISS, hypoglycemia protocol  - endocrine consulted for hyperglycemia  - started on lantus 18 units and admelog 7 units TID per endocrine however had episodes of hypoglycemia --> transitioned to admelog 14 - 4 - 4

## 2023-03-10 NOTE — PROGRESS NOTE ADULT - ASSESSMENT
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 He is now back in sinus tachycardia. Course c/b by URI w/ human metapneumovirus. Would assess for superimposed infection given immunosuppression. Afib/flutter would be concerning for rejection, but repeat echo here with normal LV function. Also noted to have acute on chronic kidney injury likely 2/2 hypovolemia.

## 2023-03-11 LAB
ANION GAP SERPL CALC-SCNC: 14 MMOL/L — SIGNIFICANT CHANGE UP (ref 5–17)
BUN SERPL-MCNC: 32 MG/DL — HIGH (ref 7–23)
CALCIUM SERPL-MCNC: 7.3 MG/DL — LOW (ref 8.4–10.5)
CHLORIDE SERPL-SCNC: 105 MMOL/L — SIGNIFICANT CHANGE UP (ref 96–108)
CO2 SERPL-SCNC: 19 MMOL/L — LOW (ref 22–31)
CREAT SERPL-MCNC: 1.53 MG/DL — HIGH (ref 0.5–1.3)
EGFR: 48 ML/MIN/1.73M2 — LOW
GLUCOSE BLDC GLUCOMTR-MCNC: 151 MG/DL — HIGH (ref 70–99)
GLUCOSE BLDC GLUCOMTR-MCNC: 210 MG/DL — HIGH (ref 70–99)
GLUCOSE BLDC GLUCOMTR-MCNC: 231 MG/DL — HIGH (ref 70–99)
GLUCOSE BLDC GLUCOMTR-MCNC: 278 MG/DL — HIGH (ref 70–99)
GLUCOSE BLDC GLUCOMTR-MCNC: 338 MG/DL — HIGH (ref 70–99)
GLUCOSE SERPL-MCNC: 178 MG/DL — HIGH (ref 70–99)
HCT VFR BLD CALC: 33.8 % — LOW (ref 39–50)
HGB BLD-MCNC: 10.7 G/DL — LOW (ref 13–17)
MAGNESIUM SERPL-MCNC: 1.7 MG/DL — SIGNIFICANT CHANGE UP (ref 1.6–2.6)
MCHC RBC-ENTMCNC: 31.7 GM/DL — LOW (ref 32–36)
MCHC RBC-ENTMCNC: 32.4 PG — SIGNIFICANT CHANGE UP (ref 27–34)
MCV RBC AUTO: 102.4 FL — HIGH (ref 80–100)
NRBC # BLD: 0 /100 WBCS — SIGNIFICANT CHANGE UP (ref 0–0)
PHOSPHATE SERPL-MCNC: 1.6 MG/DL — LOW (ref 2.5–4.5)
PLATELET # BLD AUTO: 339 K/UL — SIGNIFICANT CHANGE UP (ref 150–400)
POTASSIUM SERPL-MCNC: 5.1 MMOL/L — SIGNIFICANT CHANGE UP (ref 3.5–5.3)
POTASSIUM SERPL-SCNC: 5.1 MMOL/L — SIGNIFICANT CHANGE UP (ref 3.5–5.3)
RBC # BLD: 3.3 M/UL — LOW (ref 4.2–5.8)
RBC # FLD: 15.8 % — HIGH (ref 10.3–14.5)
SODIUM SERPL-SCNC: 138 MMOL/L — SIGNIFICANT CHANGE UP (ref 135–145)
TACROLIMUS SERPL-MCNC: 2.6 NG/ML — SIGNIFICANT CHANGE UP
WBC # BLD: 13.87 K/UL — HIGH (ref 3.8–10.5)
WBC # FLD AUTO: 13.87 K/UL — HIGH (ref 3.8–10.5)

## 2023-03-11 PROCEDURE — 99232 SBSQ HOSP IP/OBS MODERATE 35: CPT

## 2023-03-11 PROCEDURE — 99232 SBSQ HOSP IP/OBS MODERATE 35: CPT | Mod: GC

## 2023-03-11 RX ORDER — INSULIN LISPRO 100/ML
18 VIAL (ML) SUBCUTANEOUS
Refills: 0 | Status: DISCONTINUED | OUTPATIENT
Start: 2023-03-12 | End: 2023-03-14

## 2023-03-11 RX ORDER — TACROLIMUS 5 MG/1
1.5 CAPSULE ORAL
Refills: 0 | Status: DISCONTINUED | OUTPATIENT
Start: 2023-03-11 | End: 2023-03-16

## 2023-03-11 RX ORDER — INSULIN GLARGINE 100 [IU]/ML
22 INJECTION, SOLUTION SUBCUTANEOUS AT BEDTIME
Refills: 0 | Status: DISCONTINUED | OUTPATIENT
Start: 2023-03-11 | End: 2023-03-15

## 2023-03-11 RX ADMIN — SODIUM CHLORIDE 4 MILLILITER(S): 9 INJECTION INTRAMUSCULAR; INTRAVENOUS; SUBCUTANEOUS at 17:52

## 2023-03-11 RX ADMIN — Medication 2: at 09:27

## 2023-03-11 RX ADMIN — SODIUM CHLORIDE 4 MILLILITER(S): 9 INJECTION INTRAMUSCULAR; INTRAVENOUS; SUBCUTANEOUS at 06:05

## 2023-03-11 RX ADMIN — VALGANCICLOVIR 450 MILLIGRAM(S): 450 TABLET, FILM COATED ORAL at 05:30

## 2023-03-11 RX ADMIN — Medication 500000 UNIT(S): at 17:49

## 2023-03-11 RX ADMIN — Medication 200 MILLIGRAM(S): at 05:30

## 2023-03-11 RX ADMIN — Medication 60 MILLIGRAM(S): at 05:30

## 2023-03-11 RX ADMIN — PANTOPRAZOLE SODIUM 40 MILLIGRAM(S): 20 TABLET, DELAYED RELEASE ORAL at 05:30

## 2023-03-11 RX ADMIN — Medication 6: at 13:18

## 2023-03-11 RX ADMIN — APIXABAN 5 MILLIGRAM(S): 2.5 TABLET, FILM COATED ORAL at 06:04

## 2023-03-11 RX ADMIN — Medication 200 MILLIGRAM(S): at 01:54

## 2023-03-11 RX ADMIN — Medication 200 MILLIGRAM(S): at 17:52

## 2023-03-11 RX ADMIN — Medication 14 UNIT(S): at 09:27

## 2023-03-11 RX ADMIN — Medication 4: at 17:50

## 2023-03-11 RX ADMIN — TACROLIMUS 1.5 MILLIGRAM(S): 5 CAPSULE ORAL at 20:14

## 2023-03-11 RX ADMIN — Medication 500000 UNIT(S): at 09:26

## 2023-03-11 RX ADMIN — Medication 500000 UNIT(S): at 12:13

## 2023-03-11 RX ADMIN — ATORVASTATIN CALCIUM 10 MILLIGRAM(S): 80 TABLET, FILM COATED ORAL at 21:39

## 2023-03-11 RX ADMIN — Medication 3 MILLILITER(S): at 12:07

## 2023-03-11 RX ADMIN — Medication 4: at 21:40

## 2023-03-11 RX ADMIN — TACROLIMUS 0.5 MILLIGRAM(S): 5 CAPSULE ORAL at 09:59

## 2023-03-11 RX ADMIN — Medication 500000 UNIT(S): at 20:14

## 2023-03-11 RX ADMIN — Medication 1 MILLIGRAM(S): at 11:59

## 2023-03-11 RX ADMIN — Medication 200 MILLIGRAM(S): at 05:29

## 2023-03-11 RX ADMIN — Medication 650 MILLIGRAM(S): at 14:57

## 2023-03-11 RX ADMIN — Medication 650 MILLIGRAM(S): at 15:25

## 2023-03-11 RX ADMIN — Medication 3 MILLILITER(S): at 00:04

## 2023-03-11 RX ADMIN — SIMETHICONE 80 MILLIGRAM(S): 80 TABLET, CHEWABLE ORAL at 12:01

## 2023-03-11 RX ADMIN — INSULIN GLARGINE 22 UNIT(S): 100 INJECTION, SOLUTION SUBCUTANEOUS at 21:40

## 2023-03-11 RX ADMIN — Medication 3 MILLILITER(S): at 17:50

## 2023-03-11 RX ADMIN — Medication 200 MILLIGRAM(S): at 11:59

## 2023-03-11 RX ADMIN — Medication 3 MILLILITER(S): at 06:05

## 2023-03-11 RX ADMIN — Medication 4 UNIT(S): at 13:19

## 2023-03-11 RX ADMIN — Medication 4 UNIT(S): at 17:51

## 2023-03-11 RX ADMIN — APIXABAN 5 MILLIGRAM(S): 2.5 TABLET, FILM COATED ORAL at 17:53

## 2023-03-11 RX ADMIN — Medication 81 MILLIGRAM(S): at 11:59

## 2023-03-11 NOTE — PROGRESS NOTE ADULT - ASSESSMENT
73y/o M w/h/o HTN, HLD, NICM, chronic systolic heart failure s/p HM2 LVAD (6/2017) s/p heart transplant from Hep. C donor (treated) 2/23/18 (post op course complicated by graft dysfunction treated by plasmapheresis, IVIG, and rituximab). No DM hx per pt report. Here w/ chest tightness x1d found to be in aflutter 2/2 pneumonia and more recently found to have Hemolytic anemia> on steroids. Endocrinology consulted for management steroid induced hyperglycemia. Tolerating POs w/elevated glucose prior to lunch 2/2 steroid effect. Pt understands will need to go home on basal/bolus regimen given high dose steroids. Has been on insulin in past and feels comfortable using insulin. BG goal (100-180mg/dl).

## 2023-03-11 NOTE — PROGRESS NOTE ADULT - PROBLEM SELECTOR PLAN 1
-test BG AC/HS  - Lantus increased to 22 units QHS by primary team  -Adjust Admelog 18-4-4 w/meals. HOLD IF NOT EATING   -c/w Admelog moderate correction scale AC and mod HS scale for now  -Needs RD consult  -On prednisone 60mg PO daily. Notify endocrine team if this is changed.   - Please teach and allow pt to do own finger sticks and insulin injections under RN supervision in case pt  is discharged home on high dose steroids requiring insulin therapy.   Please teach use of insulin pen  Please document teach back  Discharge plan:  -Will be determined according to BG/insulin needs/PO intake and steroid therapy at time of discharge.  Send RX for Lantus solostar Pen 20 units QHS and Admelog pen 18-4-4 w/meals  -Pt has new glucometer w/supplies at bedside-needs RN to review glucometer  - Home care due to new DM diagnosis and likely need of insulin therapy  - Patient to call doctor with persistent high or low BG at home.   - Recommend routine outpatient ophthalmology, podiatry   - Will need endocrinology f/u if pt send home on insulin/high dose steroids Can follow at [facility]. [address] [place] [address] [address]. Phone .   1) March 28 11am w/NP  2) Aug 15th 10:45am w/Dr Clemens.

## 2023-03-11 NOTE — PROGRESS NOTE ADULT - SUBJECTIVE AND OBJECTIVE BOX
***************************************************************  Veda Peters, PGY3  Internal Medicine   pager: NS: [phone] [facility]: [phone]  ***************************************************************    PROGRESS NOTE:     Patient is a 72y old  Male who presents with a chief complaint of flutter (10 Mar 2023 23:20)      SUBJECTIVE / OVERNIGHT EVENTS:  No acute events overnight.    MEDICATIONS  (STANDING):  albuterol/ipratropium for Nebulization 3 milliLiter(s) Nebulizer every 6 hours  apixaban 5 milliGRAM(s) Oral every 12 hours  aspirin enteric coated 81 milliGRAM(s) Oral daily  atorvastatin 10 milliGRAM(s) Oral at bedtime  dextrose 5%. 1000 milliLiter(s) (50 mL/Hr) IV Continuous <Continuous>  dextrose 5%. 1000 milliLiter(s) (100 mL/Hr) IV Continuous <Continuous>  dextrose 50% Injectable 25 Gram(s) IV Push once  dextrose 50% Injectable 12.5 Gram(s) IV Push once  dextrose 50% Injectable 25 Gram(s) IV Push once  dextrose Oral Gel 15 Gram(s) Oral once  folic acid 1 milliGRAM(s) Oral daily  glucagon  Injectable 1 milliGRAM(s) IntraMuscular once  guaiFENesin Oral Liquid (Sugar-Free) 200 milliGRAM(s) Oral every 6 hours  insulin glargine Injectable (LANTUS) 20 Unit(s) SubCutaneous at bedtime  insulin lispro (ADMELOG) corrective regimen sliding scale   SubCutaneous <User Schedule>  insulin lispro (ADMELOG) corrective regimen sliding scale   SubCutaneous three times a day before meals  insulin lispro Injectable (ADMELOG) 4 Unit(s) SubCutaneous before dinner  insulin lispro Injectable (ADMELOG) 4 Unit(s) SubCutaneous before lunch  insulin lispro Injectable (ADMELOG) 14 Unit(s) SubCutaneous before breakfast  metoprolol succinate  milliGRAM(s) Oral daily  nystatin    Suspension 027996 Unit(s) Oral <User Schedule>  pantoprazole    Tablet 40 milliGRAM(s) Oral before breakfast  polyethylene glycol 3350 17 Gram(s) Oral daily  predniSONE   Tablet 60 milliGRAM(s) Oral daily  senna 1 Tablet(s) Oral daily  simethicone 80 milliGRAM(s) Chew daily  sodium chloride 3%  Inhalation 4 milliLiter(s) Inhalation every 12 hours  tacrolimus 0.5 milliGRAM(s) Oral <User Schedule>  tacrolimus 1 milliGRAM(s) Oral <User Schedule>  trimethoprim   80 mG/sulfamethoxazole 400 mG 1 Tablet(s) Oral every 48 hours  valGANciclovir 450 milliGRAM(s) Oral <User Schedule>    MEDICATIONS  (PRN):  acetaminophen     Tablet .. 650 milliGRAM(s) Oral every 6 hours PRN Temp greater or equal to 38C (100.4F), Mild Pain (1 - 3)  melatonin 3 milliGRAM(s) Oral at bedtime PRN Insomnia      CAPILLARY BLOOD GLUCOSE      POCT Blood Glucose.: 231 mg/dL (11 Mar 2023 03:30)  POCT Blood Glucose.: 218 mg/dL (10 Mar 2023 22:24)  POCT Blood Glucose.: 154 mg/dL (10 Mar 2023 16:56)  POCT Blood Glucose.: 274 mg/dL (10 Mar 2023 12:33)  POCT Blood Glucose.: 129 mg/dL (10 Mar 2023 08:40)    I&O's Summary    09 Mar 2023 07:01  -  10 Mar 2023 07:00  --------------------------------------------------------  IN: 620 mL / OUT: 200 mL / NET: 420 mL    10 Mar 2023 07:01  -  11 Mar 2023 06:58  --------------------------------------------------------  IN: 480 mL / OUT: 800 mL / NET: -320 mL        PHYSICAL EXAM:  Vital Signs Last 24 Hrs  T(C): 36.3 (11 Mar 2023 04:31), Max: 36.4 (10 Mar 2023 21:14)  T(F): 97.4 (11 Mar 2023 04:31), Max: 97.6 (10 Mar 2023 21:14)  HR: 98 (11 Mar 2023 04:31) (93 - 98)  BP: 122/84 (11 Mar 2023 04:31) (118/79 - 122/84)  BP(mean): --  RR: 18 (11 Mar 2023 04:31) (18 - 18)  SpO2: 100% (11 Mar 2023 04:31) (97% - 100%)    Parameters below as of 11 Mar 2023 04:31  Patient On (Oxygen Delivery Method): room air    General: NAD  HEENT: NC/AT; PERRL, clear conjunctiva  Neck: supple  Respiratory: CTA b/l  Cardiovascular: +S1/S2; RRR  Abdomen: soft, NT/ND; +BS x4  Extremities:  no LE edema  Vascular: WWP, 2+ peripheral pulses b/l;  Skin: normal color and turgor; no rash  Neurological: A&Ox3, move all extremities. CN II-XII intact    LABS:                        10.6   12.68 )-----------( 309      ( 10 Mar 2023 09:08 )             33.0     03-10    137  |  105  |  43<H>  ----------------------------<  130<H>  4.0   |  21<L>  |  1.80<H>    Ca    7.4<L>      10 Mar 2023 09:08  Phos  1.6     03-10  Mg     1.8     03-10      PTT - ( 09 Mar 2023 08:24 )  PTT:124.9 sec          Culture - Blood (collected 08 Mar 2023 08:00)  Source: .Blood Blood-Peripheral  Preliminary Report (09 Mar 2023 13:02):    No growth to date.    Culture - Blood (collected 08 Mar 2023 07:45)  Source: .Blood Blood-Peripheral  Preliminary Report (09 Mar 2023 13:02):    No growth to date.        RADIOLOGY & ADDITIONAL TESTS:  Results Reviewed:   Imaging Personally Reviewed:  Electrocardiogram Personally Reviewed:    COORDINATION OF CARE:  Care Discussed with Consultants/Other Providers [Y/N]:  Prior or Outpatient Records Reviewed [Y/N]:   ***************************************************************  Veda Peters, PGY3  Internal Medicine   pager: NS: [phone] [facility]: [phone]  ***************************************************************    PROGRESS NOTE:     Patient is a 72y old  Male who presents with a chief complaint of flutter (10 Mar 2023 23:20)      SUBJECTIVE / OVERNIGHT EVENTS:  No acute events overnight. Patient on tele in NSR with HR 90s. He denies CP, SOB, abdominal pain, d/c, f/c. Am FSG 200s, and increased lantus from 20 U to 22 U. Planning to d/c Monday after additional diabetic teaching. Patient refused labs this morning.     MEDICATIONS  (STANDING):  albuterol/ipratropium for Nebulization 3 milliLiter(s) Nebulizer every 6 hours  apixaban 5 milliGRAM(s) Oral every 12 hours  aspirin enteric coated 81 milliGRAM(s) Oral daily  atorvastatin 10 milliGRAM(s) Oral at bedtime  dextrose 5%. 1000 milliLiter(s) (50 mL/Hr) IV Continuous <Continuous>  dextrose 5%. 1000 milliLiter(s) (100 mL/Hr) IV Continuous <Continuous>  dextrose 50% Injectable 25 Gram(s) IV Push once  dextrose 50% Injectable 12.5 Gram(s) IV Push once  dextrose 50% Injectable 25 Gram(s) IV Push once  dextrose Oral Gel 15 Gram(s) Oral once  folic acid 1 milliGRAM(s) Oral daily  glucagon  Injectable 1 milliGRAM(s) IntraMuscular once  guaiFENesin Oral Liquid (Sugar-Free) 200 milliGRAM(s) Oral every 6 hours  insulin glargine Injectable (LANTUS) 20 Unit(s) SubCutaneous at bedtime  insulin lispro (ADMELOG) corrective regimen sliding scale   SubCutaneous <User Schedule>  insulin lispro (ADMELOG) corrective regimen sliding scale   SubCutaneous three times a day before meals  insulin lispro Injectable (ADMELOG) 4 Unit(s) SubCutaneous before dinner  insulin lispro Injectable (ADMELOG) 4 Unit(s) SubCutaneous before lunch  insulin lispro Injectable (ADMELOG) 14 Unit(s) SubCutaneous before breakfast  metoprolol succinate  milliGRAM(s) Oral daily  nystatin    Suspension 536064 Unit(s) Oral <User Schedule>  pantoprazole    Tablet 40 milliGRAM(s) Oral before breakfast  polyethylene glycol 3350 17 Gram(s) Oral daily  predniSONE   Tablet 60 milliGRAM(s) Oral daily  senna 1 Tablet(s) Oral daily  simethicone 80 milliGRAM(s) Chew daily  sodium chloride 3%  Inhalation 4 milliLiter(s) Inhalation every 12 hours  tacrolimus 0.5 milliGRAM(s) Oral <User Schedule>  tacrolimus 1 milliGRAM(s) Oral <User Schedule>  trimethoprim   80 mG/sulfamethoxazole 400 mG 1 Tablet(s) Oral every 48 hours  valGANciclovir 450 milliGRAM(s) Oral <User Schedule>    MEDICATIONS  (PRN):  acetaminophen     Tablet .. 650 milliGRAM(s) Oral every 6 hours PRN Temp greater or equal to 38C (100.4F), Mild Pain (1 - 3)  melatonin 3 milliGRAM(s) Oral at bedtime PRN Insomnia      CAPILLARY BLOOD GLUCOSE      POCT Blood Glucose.: 231 mg/dL (11 Mar 2023 03:30)  POCT Blood Glucose.: 218 mg/dL (10 Mar 2023 22:24)  POCT Blood Glucose.: 154 mg/dL (10 Mar 2023 16:56)  POCT Blood Glucose.: 274 mg/dL (10 Mar 2023 12:33)  POCT Blood Glucose.: 129 mg/dL (10 Mar 2023 08:40)    I&O's Summary    09 Mar 2023 07:01  -  10 Mar 2023 07:00  --------------------------------------------------------  IN: 620 mL / OUT: 200 mL / NET: 420 mL    10 Mar 2023 07:01  -  11 Mar 2023 06:58  --------------------------------------------------------  IN: 480 mL / OUT: 800 mL / NET: -320 mL        PHYSICAL EXAM:  Vital Signs Last 24 Hrs  T(C): 36.3 (11 Mar 2023 04:31), Max: 36.4 (10 Mar 2023 21:14)  T(F): 97.4 (11 Mar 2023 04:31), Max: 97.6 (10 Mar 2023 21:14)  HR: 98 (11 Mar 2023 04:31) (93 - 98)  BP: 122/84 (11 Mar 2023 04:31) (118/79 - 122/84)  BP(mean): --  RR: 18 (11 Mar 2023 04:31) (18 - 18)  SpO2: 100% (11 Mar 2023 04:31) (97% - 100%)    Parameters below as of 11 Mar 2023 04:31  Patient On (Oxygen Delivery Method): room air    General: NAD  HEENT: NC/AT; PERRL, clear conjunctiva  Neck: supple  Respiratory: CTA b/l  Cardiovascular: +S1/S2; RRR  Abdomen: soft, NT/ND; +BS x4  Extremities:  no LE edema  Vascular: WWP, 2+ peripheral pulses b/l;  Skin: normal color and turgor; no rash  Neurological: A&Ox3, move all extremities. CN II-XII intact    LABS:                        10.6   12.68 )-----------( 309      ( 10 Mar 2023 09:08 )             33.0     03-10    137  |  105  |  43<H>  ----------------------------<  130<H>  4.0   |  21<L>  |  1.80<H>    Ca    7.4<L>      10 Mar 2023 09:08  Phos  1.6     03-10  Mg     1.8     03-10      PTT - ( 09 Mar 2023 08:24 )  PTT:124.9 sec          Culture - Blood (collected 08 Mar 2023 08:00)  Source: .Blood Blood-Peripheral  Preliminary Report (09 Mar 2023 13:02):    No growth to date.    Culture - Blood (collected 08 Mar 2023 07:45)  Source: .Blood Blood-Peripheral  Preliminary Report (09 Mar 2023 13:02):    No growth to date.        RADIOLOGY & ADDITIONAL TESTS:  Results Reviewed:   Imaging Personally Reviewed:  Electrocardiogram Personally Reviewed:    COORDINATION OF CARE:  Care Discussed with Consultants/Other Providers [Y/N]:  Prior or Outpatient Records Reviewed [Y/N]:

## 2023-03-11 NOTE — PROGRESS NOTE ADULT - PROBLEM SELECTOR PLAN 3
Home regimen: pravastatin 20mg daily  - LDL goal <70 . Pt LDL 51  - Continue atorvastatin 10mg daily while in patient  - Restart home statin upon discharge if not contraindicated.    discussed w/pt and team  Can be reached via TEAMS/pager: [phone]   office:  [phone] (M-F 9a-5pm)               [phone] (nights/weekends)   Can access Amion coverage via sunrise/tools.

## 2023-03-11 NOTE — PROGRESS NOTE ADULT - SUBJECTIVE AND OBJECTIVE BOX
Diabetes Follow up note:    Chief complaint: Steroid induced hyperglycemia    Interval Hx: BG values 150-270s over past 24 hours. Pt seen at bedside sitting in chair. Reports feeling well. Pt feels comfortable doing insulin as he has been on insulin in past. Tolerating POs. To be discharged home on Prednisone 60mg daily.     Review of Systems:  General: denies pain  GI: Tolerating POs. Denies N/V/D/Abd pain  CV: Denies CP/SOB  ENDO: No S&Sx of hypoglycemia    MEDS:  atorvastatin 10 milliGRAM(s) Oral at bedtime  insulin glargine Injectable (LANTUS) 22 Unit(s) SubCutaneous at bedtime  insulin lispro (ADMELOG) corrective regimen sliding scale   SubCutaneous <User Schedule>  insulin lispro (ADMELOG) corrective regimen sliding scale   SubCutaneous three times a day before meals  insulin lispro Injectable (ADMELOG) 4 Unit(s) SubCutaneous before dinner  insulin lispro Injectable (ADMELOG) 4 Unit(s) SubCutaneous before lunch  insulin lispro Injectable (ADMELOG) 14 Unit(s) SubCutaneous before breakfast  predniSONE   Tablet 60 milliGRAM(s) Oral daily    nystatin    Suspension 258681 Unit(s) Oral <User Schedule>  trimethoprim   80 mG/sulfamethoxazole 400 mG 1 Tablet(s) Oral every 48 hours  valGANciclovir 450 milliGRAM(s) Oral <User Schedule>    Allergies    No Known Allergies        PE:  General: Male sitting in chair. NAD.   Vital Signs Last 24 Hrs  T(C): 36.7 (11 Mar 2023 12:24), Max: 36.7 (11 Mar 2023 12:24)  T(F): 98 (11 Mar 2023 12:24), Max: 98 (11 Mar 2023 12:24)  HR: 97 (11 Mar 2023 12:24) (94 - 98)  BP: 114/76 (11 Mar 2023 12:24) (114/76 - 122/84)  BP(mean): --  RR: 18 (11 Mar 2023 12:24) (18 - 18)  SpO2: 97% (11 Mar 2023 12:24) (97% - 100%)    Parameters below as of 11 Mar 2023 12:24  Patient On (Oxygen Delivery Method): room air      Abd: Soft, NT,ND,   Extremities: Warm  Neuro: A&O X3    LABS:  POCT Blood Glucose.: 278 mg/dL (03-11-23 @ 12:51)  POCT Blood Glucose.: 151 mg/dL (03-11-23 @ 08:55)  POCT Blood Glucose.: 231 mg/dL (03-11-23 @ 03:30)  POCT Blood Glucose.: 218 mg/dL (03-10-23 @ 22:24)  POCT Blood Glucose.: 154 mg/dL (03-10-23 @ 16:56)  POCT Blood Glucose.: 274 mg/dL (03-10-23 @ 12:33)  POCT Blood Glucose.: 129 mg/dL (03-10-23 @ 08:40)  POCT Blood Glucose.: 172 mg/dL (03-10-23 @ 04:22)  POCT Blood Glucose.: 125 mg/dL (03-09-23 @ 21:53)  POCT Blood Glucose.: 89 mg/dL (03-09-23 @ 21:12)  POCT Blood Glucose.: 82 mg/dL (03-09-23 @ 17:06)  POCT Blood Glucose.: 248 mg/dL (03-09-23 @ 12:51)  POCT Blood Glucose.: 144 mg/dL (03-09-23 @ 08:59)  POCT Blood Glucose.: 204 mg/dL (03-09-23 @ 02:49)  POCT Blood Glucose.: 275 mg/dL (03-08-23 @ 21:37)  POCT Blood Glucose.: 66 mg/dL (03-08-23 @ 17:19)                            10.7   13.87 )-----------( 339      ( 11 Mar 2023 10:55 )             33.8       03-11    138  |  105  |  32<H>  ----------------------------<  178<H>  5.1   |  19<L>  |  1.53<H>    eGFR: 48<L>    Ca    7.3<L>      03-11  Mg     1.7     03-11  Phos  1.6     03-11        Thyroid Function Tests:  03-03 @ 15:09 TSH 1.06 FreeT4 -- T3 -- Anti TPO -- Anti Thyroglobulin Ab -- TSI --      A1C with Estimated Average Glucose Result: 4.7 % (02-17-23 @ 11:59)          Contact number: deanne [phone] or [phone]

## 2023-03-11 NOTE — PROGRESS NOTE ADULT - ATTENDING COMMENTS
# hMPV PNA  # r/o superimposed bacterial/fungal infection  # NORMAN  # Aflutter with RVR  # AIHA on high dose steroids  # steroid induced hyperglycemia  # s/p heart transplant  # hx of C.diff colitis    - Pt initially presented chest tightness found to have AFlutter at rate 140bpm but self converted to sinus rhythm and now rate controlled  - given elevated [id number] score to 2, started on systemic AC (eliquis); fu with EP as outpatient for further management  - s/p 5 day course of cefepime, stable on RA with improvement in cough  - Bcx 1/4 with GPC, fu speciation and repeat BCx - suspect contamination  - new onset NORMAN on admission; no signs of urinary retention (bladder scan negative), likely pre-renal in setting of active infection vs. ATN from vancomycin; send UA with microscopic exam as well as renal US; overall, SCr improving continuously; likely pre-renal in setting of active infection  - prednisone dose reduced to 60mg daily  - FS now better controlled on current regimen  - tacro level check, goal 6-8. Increase dose to 1.5mg BID  - appreciate transplant cards: plan for EMBx inpatient vs. outpatient  - pt is currently stable on RA, improvement in symptoms - hold off on pulm consult for bronchoscopy at this time  - appreciate interdisciplinary team recommendations from transplant cards, ID, endo    F/u CM on dispo planning  d/w HS1    Alanis Schultz MD  Division of Hospital Medicine  Available on [employer] Teams

## 2023-03-12 LAB
ANION GAP SERPL CALC-SCNC: 11 MMOL/L — SIGNIFICANT CHANGE UP (ref 5–17)
APPEARANCE UR: CLEAR — SIGNIFICANT CHANGE UP
BACTERIA # UR AUTO: NEGATIVE — SIGNIFICANT CHANGE UP
BASOPHILS # BLD AUTO: 0.04 K/UL — SIGNIFICANT CHANGE UP (ref 0–0.2)
BASOPHILS NFR BLD AUTO: 0.2 % — SIGNIFICANT CHANGE UP (ref 0–2)
BILIRUB UR-MCNC: NEGATIVE — SIGNIFICANT CHANGE UP
BUN SERPL-MCNC: 32 MG/DL — HIGH (ref 7–23)
CALCIUM SERPL-MCNC: 7.3 MG/DL — LOW (ref 8.4–10.5)
CHLORIDE SERPL-SCNC: 109 MMOL/L — HIGH (ref 96–108)
CO2 SERPL-SCNC: 22 MMOL/L — SIGNIFICANT CHANGE UP (ref 22–31)
COLOR SPEC: YELLOW — SIGNIFICANT CHANGE UP
CREAT SERPL-MCNC: 1.58 MG/DL — HIGH (ref 0.5–1.3)
DIFF PNL FLD: ABNORMAL
EGFR: 46 ML/MIN/1.73M2 — LOW
EOSINOPHIL # BLD AUTO: 0 K/UL — SIGNIFICANT CHANGE UP (ref 0–0.5)
EOSINOPHIL NFR BLD AUTO: 0 % — SIGNIFICANT CHANGE UP (ref 0–6)
EPI CELLS # UR: 1 /HPF — SIGNIFICANT CHANGE UP
GLUCOSE BLDC GLUCOMTR-MCNC: 151 MG/DL — HIGH (ref 70–99)
GLUCOSE BLDC GLUCOMTR-MCNC: 195 MG/DL — HIGH (ref 70–99)
GLUCOSE BLDC GLUCOMTR-MCNC: 196 MG/DL — HIGH (ref 70–99)
GLUCOSE BLDC GLUCOMTR-MCNC: 205 MG/DL — HIGH (ref 70–99)
GLUCOSE BLDC GLUCOMTR-MCNC: 225 MG/DL — HIGH (ref 70–99)
GLUCOSE SERPL-MCNC: 187 MG/DL — HIGH (ref 70–99)
GLUCOSE UR QL: ABNORMAL
GRAM STN FLD: SIGNIFICANT CHANGE UP
HCT VFR BLD CALC: 34 % — LOW (ref 39–50)
HGB BLD-MCNC: 10.7 G/DL — LOW (ref 13–17)
HYALINE CASTS # UR AUTO: 8 /LPF — HIGH (ref 0–2)
IMM GRANULOCYTES NFR BLD AUTO: 6.6 % — HIGH (ref 0–0.9)
KETONES UR-MCNC: NEGATIVE — SIGNIFICANT CHANGE UP
LEUKOCYTE ESTERASE UR-ACNC: NEGATIVE — SIGNIFICANT CHANGE UP
LYMPHOCYTES # BLD AUTO: 1.09 K/UL — SIGNIFICANT CHANGE UP (ref 1–3.3)
LYMPHOCYTES # BLD AUTO: 6.2 % — LOW (ref 13–44)
MAGNESIUM SERPL-MCNC: 1.6 MG/DL — SIGNIFICANT CHANGE UP (ref 1.6–2.6)
MCHC RBC-ENTMCNC: 31.5 GM/DL — LOW (ref 32–36)
MCHC RBC-ENTMCNC: 32.8 PG — SIGNIFICANT CHANGE UP (ref 27–34)
MCV RBC AUTO: 104.3 FL — HIGH (ref 80–100)
MONOCYTES # BLD AUTO: 0.32 K/UL — SIGNIFICANT CHANGE UP (ref 0–0.9)
MONOCYTES NFR BLD AUTO: 1.8 % — LOW (ref 2–14)
NEUTROPHILS # BLD AUTO: 15.08 K/UL — HIGH (ref 1.8–7.4)
NEUTROPHILS NFR BLD AUTO: 85.2 % — HIGH (ref 43–77)
NITRITE UR-MCNC: NEGATIVE — SIGNIFICANT CHANGE UP
NRBC # BLD: 1 /100 WBCS — HIGH (ref 0–0)
PH UR: 6 — SIGNIFICANT CHANGE UP (ref 5–8)
PHOSPHATE SERPL-MCNC: 1.1 MG/DL — LOW (ref 2.5–4.5)
PLATELET # BLD AUTO: 392 K/UL — SIGNIFICANT CHANGE UP (ref 150–400)
POTASSIUM SERPL-MCNC: 4.3 MMOL/L — SIGNIFICANT CHANGE UP (ref 3.5–5.3)
POTASSIUM SERPL-SCNC: 4.3 MMOL/L — SIGNIFICANT CHANGE UP (ref 3.5–5.3)
PROT UR-MCNC: ABNORMAL
RBC # BLD: 3.26 M/UL — LOW (ref 4.2–5.8)
RBC # FLD: 16.3 % — HIGH (ref 10.3–14.5)
RBC CASTS # UR COMP ASSIST: 2 /HPF — SIGNIFICANT CHANGE UP (ref 0–4)
SODIUM SERPL-SCNC: 142 MMOL/L — SIGNIFICANT CHANGE UP (ref 135–145)
SP GR SPEC: 1.03 — HIGH (ref 1.01–1.02)
SPECIMEN SOURCE: SIGNIFICANT CHANGE UP
TACROLIMUS SERPL-MCNC: 2.4 NG/ML — SIGNIFICANT CHANGE UP
UROBILINOGEN FLD QL: NEGATIVE — SIGNIFICANT CHANGE UP
WBC # BLD: 17.69 K/UL — HIGH (ref 3.8–10.5)
WBC # FLD AUTO: 17.69 K/UL — HIGH (ref 3.8–10.5)
WBC UR QL: 2 /HPF — SIGNIFICANT CHANGE UP (ref 0–5)

## 2023-03-12 PROCEDURE — 99233 SBSQ HOSP IP/OBS HIGH 50: CPT

## 2023-03-12 PROCEDURE — 99232 SBSQ HOSP IP/OBS MODERATE 35: CPT

## 2023-03-12 PROCEDURE — 71045 X-RAY EXAM CHEST 1 VIEW: CPT | Mod: 26

## 2023-03-12 RX ORDER — MAGNESIUM OXIDE 400 MG ORAL TABLET 241.3 MG
400 TABLET ORAL ONCE
Refills: 0 | Status: COMPLETED | OUTPATIENT
Start: 2023-03-12 | End: 2023-03-12

## 2023-03-12 RX ORDER — INSULIN LISPRO 100/ML
6 VIAL (ML) SUBCUTANEOUS
Refills: 0 | Status: DISCONTINUED | OUTPATIENT
Start: 2023-03-12 | End: 2023-03-14

## 2023-03-12 RX ORDER — MAGNESIUM SULFATE 500 MG/ML
1 VIAL (ML) INJECTION ONCE
Refills: 0 | Status: DISCONTINUED | OUTPATIENT
Start: 2023-03-12 | End: 2023-03-12

## 2023-03-12 RX ORDER — SODIUM,POTASSIUM PHOSPHATES 278-250MG
1 POWDER IN PACKET (EA) ORAL
Refills: 0 | Status: COMPLETED | OUTPATIENT
Start: 2023-03-12 | End: 2023-03-12

## 2023-03-12 RX ORDER — VALGANCICLOVIR 450 MG/1
450 TABLET, FILM COATED ORAL DAILY
Refills: 0 | Status: DISCONTINUED | OUTPATIENT
Start: 2023-03-12 | End: 2023-03-18

## 2023-03-12 RX ADMIN — Medication 1 MILLIGRAM(S): at 11:31

## 2023-03-12 RX ADMIN — APIXABAN 5 MILLIGRAM(S): 2.5 TABLET, FILM COATED ORAL at 06:07

## 2023-03-12 RX ADMIN — Medication 60 MILLIGRAM(S): at 06:07

## 2023-03-12 RX ADMIN — VALGANCICLOVIR 450 MILLIGRAM(S): 450 TABLET, FILM COATED ORAL at 12:58

## 2023-03-12 RX ADMIN — APIXABAN 5 MILLIGRAM(S): 2.5 TABLET, FILM COATED ORAL at 17:41

## 2023-03-12 RX ADMIN — TACROLIMUS 1.5 MILLIGRAM(S): 5 CAPSULE ORAL at 20:19

## 2023-03-12 RX ADMIN — PANTOPRAZOLE SODIUM 40 MILLIGRAM(S): 20 TABLET, DELAYED RELEASE ORAL at 06:23

## 2023-03-12 RX ADMIN — TACROLIMUS 1.5 MILLIGRAM(S): 5 CAPSULE ORAL at 11:31

## 2023-03-12 RX ADMIN — Medication 200 MILLIGRAM(S): at 11:31

## 2023-03-12 RX ADMIN — Medication 1 PACKET(S): at 16:26

## 2023-03-12 RX ADMIN — Medication 1 PACKET(S): at 13:10

## 2023-03-12 RX ADMIN — ATORVASTATIN CALCIUM 10 MILLIGRAM(S): 80 TABLET, FILM COATED ORAL at 22:02

## 2023-03-12 RX ADMIN — Medication 2: at 17:39

## 2023-03-12 RX ADMIN — Medication 6 UNIT(S): at 12:59

## 2023-03-12 RX ADMIN — Medication 500000 UNIT(S): at 20:20

## 2023-03-12 RX ADMIN — Medication 500000 UNIT(S): at 11:36

## 2023-03-12 RX ADMIN — Medication 200 MILLIGRAM(S): at 06:07

## 2023-03-12 RX ADMIN — MAGNESIUM OXIDE 400 MG ORAL TABLET 400 MILLIGRAM(S): 241.3 TABLET ORAL at 14:55

## 2023-03-12 RX ADMIN — Medication 6 UNIT(S): at 17:40

## 2023-03-12 RX ADMIN — Medication 18 UNIT(S): at 09:06

## 2023-03-12 RX ADMIN — INSULIN GLARGINE 22 UNIT(S): 100 INJECTION, SOLUTION SUBCUTANEOUS at 22:02

## 2023-03-12 RX ADMIN — Medication 200 MILLIGRAM(S): at 00:03

## 2023-03-12 RX ADMIN — Medication 2: at 09:07

## 2023-03-12 RX ADMIN — Medication 81 MILLIGRAM(S): at 11:31

## 2023-03-12 RX ADMIN — Medication 500000 UNIT(S): at 16:26

## 2023-03-12 RX ADMIN — SIMETHICONE 80 MILLIGRAM(S): 80 TABLET, CHEWABLE ORAL at 11:32

## 2023-03-12 RX ADMIN — Medication 1 TABLET(S): at 11:30

## 2023-03-12 RX ADMIN — Medication 500000 UNIT(S): at 09:06

## 2023-03-12 RX ADMIN — Medication 1 PACKET(S): at 20:20

## 2023-03-12 RX ADMIN — Medication 4: at 13:00

## 2023-03-12 RX ADMIN — Medication 200 MILLIGRAM(S): at 17:40

## 2023-03-12 NOTE — PROGRESS NOTE ADULT - PROBLEM SELECTOR PLAN 6
Cr rising, Urine lytes consistent with pre-renal etiology   - Bladder scan negative for urinary retention   - monitor Cr  - holding losartan; can restart as needed for bp control  - s/p 1 L bolus; c/w maintenance fluids  - re-dosed ppx meds per improved CrCl (now 45)

## 2023-03-12 NOTE — PROGRESS NOTE ADULT - ASSESSMENT
73y/o M w/h/o HTN, HLD, NICM, chronic systolic heart failure s/p HM2 LVAD (6/2017) s/p heart transplant from Hep. C donor (treated) 2/23/18 (post op course complicated by graft dysfunction treated by plasmapheresis, IVIG, and rituximab). No DM hx per pt report. Here w/ chest tightness x1d found to be in aflutter 2/2 pneumonia and more recently found to have Hemolytic anemia> on steroids. Endocrinology consulted for management steroid induced hyperglycemia. Tolerating POs. BG values elevated yesterday 2/2 improved PO intake and steroid effect. DC planning, needs glucometer teaching today w/RN, pen reviewed pt able to give teachback to RN. DC plan for basal/bolus. BG goal (100-180mg/dl).

## 2023-03-12 NOTE — PROGRESS NOTE ADULT - ATTENDING COMMENTS
71 y/o male with hx of NICM s/p HM2 LVAD, underwent OHT 2/23/18 with hep C donor, hx of hemolytic anemia presented admitted for new alutter rvr with hmpv with PNA s/p 5d cefepime. Aflutter converted to nsr, on metoprolol. Tacrolimus increased to 1.5 mg BID.     Uptrending leukocytosis initially suspected for steriod inducted (prednsione 60mg for AIHA). Team reached out to cardiac transplant and transplant ID. Will send infectious workup (bld cx, cxr, UA), monitor off abx.     d/w HS1    Alanis Schultz MD  Division of Hospital Medicine  Available on [employer] Teams

## 2023-03-12 NOTE — PROGRESS NOTE ADULT - PROBLEM SELECTOR PLAN 3
Home regimen: pravastatin 20mg daily  - LDL goal <70 . Pt LDL 51  - Continue atorvastatin 10mg daily while in patient  - Restart home statin upon discharge if not contraindicated.    discussed w/pt and RN  Can be reached via TEAMS/pager: [phone]   office:  [phone] (M-F 9a-5pm)               [phone] (nights/weekends)   Can access Amion coverage via sunrise/tools.

## 2023-03-12 NOTE — PROGRESS NOTE ADULT - PROBLEM SELECTOR PLAN 2
Admitted w/ HR 140s, improved after giving home dose 200mg metoprolol succinate. EP consulted and determined that patient has new-onset A-flutter. TTE done, showed EF 50-55%, concentric LV remodeling, grossly normal LV and RV systolic function. Possible compensatory response 2/2 underlying infection as above.   Patient now in sinus tachycadia  - EP recs appreciated; no need for cardioversion at this time  - continue home metoprolol succinate 200mg qd; added diltiazem 30 Q6h for persistent tachycardia --> will transition to extended release upon discharge after uptitration of diltiazem   - CHADSVASC 2; start anticoagulation w/ eliquis 5mg BID

## 2023-03-12 NOTE — PROGRESS NOTE ADULT - PROBLEM SELECTOR PLAN 5
Prednisone-induced hyperglycemia w/ blood glucose elevated to 400-500 on admission. Based on outpatient records, patient has been hyperglycemic in the past, last A1c 4.7; not currently on any JAVIER or insulin. Not given any insulin in ED.   - BHB wnl, anion gap wnl  - given 5u regular insulin;  at the time  - increase standing insulin based on 24h requirements  - started ISS, hypoglycemia protocol  - endocrine consulted for hyperglycemia  - on lantus 22 units and admelog 18-4-4

## 2023-03-12 NOTE — PROGRESS NOTE ADULT - PROBLEM SELECTOR PLAN 1
-test BG AC/HS  -c/w Lantus 22 units QHS  -Adjust Admelog 18-6-6 w/meals. HOLD IF NOT EATING   -c/w Admelog moderate correction scale AC and mod HS scale for now  -cons carb diet  -On prednisone 60mg PO daily. Notify endocrine team if this is changed.   - Insulin pen/glucometer review w/RN  Discharge plan:  -Will be determined according to BG/insulin needs/PO intake and steroid therapy at time of discharge.  Send RX for Lantus solostar Pen 22 units QHS and Admelog pen 18-6-6 w/meals  -Pt has new glucometer w/supplies at bedside-needs RN to review glucometer  - Home care due to new DM diagnosis and likely need of insulin therapy  - Patient to call doctor with persistent high or low BG at home.   - Recommend routine outpatient ophthalmology, podiatry   - Will need endocrinology f/u if pt send home on insulin/high dose steroids Can follow at [facility]. [address] [place] [address] [address]. Phone .   1) March 28 11am w/NP  2) Aug 15th 10:45am w/Dr Clemens.

## 2023-03-12 NOTE — PROGRESS NOTE ADULT - SUBJECTIVE AND OBJECTIVE BOX
INTERVAL HPI/OVERNIGHT EVENTS: KEVIN OVN     SUBJECTIVE: Patient seen and examined at bedside. Patient denies any symptoms of SOB, fevers, chills, palpitations. Patient reports feeling uncertain with regards to his insulin regimen.     OBJECTIVE:    VITAL SIGNS:  ICU Vital Signs Last 24 Hrs  T(C): 36.5 (12 Mar 2023 06:05), Max: 36.7 (11 Mar 2023 12:24)  T(F): 97.7 (12 Mar 2023 06:05), Max: 98.1 (11 Mar 2023 20:47)  HR: 103 (12 Mar 2023 06:05) (97 - 108)  BP: 128/90 (12 Mar 2023 06:05) (114/76 - 128/90)  BP(mean): --  ABP: --  ABP(mean): --  RR: 18 (12 Mar 2023 06:05) (18 - 18)  SpO2: 98% (12 Mar 2023 06:05) (96% - 98%)    O2 Parameters below as of 12 Mar 2023 06:05  Patient On (Oxygen Delivery Method): room air              03-11 @ 06:01  -  03-12 @ 07:00  --------------------------------------------------------  IN: 300 mL / OUT: 0 mL / NET: 300 mL      CAPILLARY BLOOD GLUCOSE      POCT Blood Glucose.: 151 mg/dL (12 Mar 2023 08:59)      PHYSICAL EXAM:    General: NAD  HEENT: NC/AT; PERRL, clear conjunctiva  Neck: supple  Respiratory: CTA b/l  Cardiovascular: +S1/S2; tachycardic, regular rhythm  Abdomen: soft, NT/ND; +BS x4  Extremities:  no LE edema  Vascular: WWP, 2+ peripheral pulses b/l;  Skin: normal color and turgor; no rash  Neurological: A&Ox3, move all extremities. CN II-XII intact    MEDICATIONS:  MEDICATIONS  (STANDING):  albuterol/ipratropium for Nebulization 3 milliLiter(s) Nebulizer every 6 hours  apixaban 5 milliGRAM(s) Oral every 12 hours  aspirin enteric coated 81 milliGRAM(s) Oral daily  atorvastatin 10 milliGRAM(s) Oral at bedtime  dextrose 5%. 1000 milliLiter(s) (50 mL/Hr) IV Continuous <Continuous>  dextrose 5%. 1000 milliLiter(s) (100 mL/Hr) IV Continuous <Continuous>  dextrose 50% Injectable 25 Gram(s) IV Push once  dextrose 50% Injectable 12.5 Gram(s) IV Push once  dextrose 50% Injectable 25 Gram(s) IV Push once  dextrose Oral Gel 15 Gram(s) Oral once  folic acid 1 milliGRAM(s) Oral daily  glucagon  Injectable 1 milliGRAM(s) IntraMuscular once  guaiFENesin Oral Liquid (Sugar-Free) 200 milliGRAM(s) Oral every 6 hours  insulin glargine Injectable (LANTUS) 22 Unit(s) SubCutaneous at bedtime  insulin lispro (ADMELOG) corrective regimen sliding scale   SubCutaneous <User Schedule>  insulin lispro (ADMELOG) corrective regimen sliding scale   SubCutaneous three times a day before meals  insulin lispro Injectable (ADMELOG) 4 Unit(s) SubCutaneous before dinner  insulin lispro Injectable (ADMELOG) 18 Unit(s) SubCutaneous before breakfast  insulin lispro Injectable (ADMELOG) 4 Unit(s) SubCutaneous before lunch  metoprolol succinate  milliGRAM(s) Oral daily  nystatin    Suspension 522989 Unit(s) Oral <User Schedule>  pantoprazole    Tablet 40 milliGRAM(s) Oral before breakfast  polyethylene glycol 3350 17 Gram(s) Oral daily  predniSONE   Tablet 60 milliGRAM(s) Oral daily  senna 1 Tablet(s) Oral daily  simethicone 80 milliGRAM(s) Chew daily  sodium chloride 3%  Inhalation 4 milliLiter(s) Inhalation every 12 hours  tacrolimus 1.5 milliGRAM(s) Oral <User Schedule>  trimethoprim   80 mG/sulfamethoxazole 400 mG 1 Tablet(s) Oral every 48 hours  valGANciclovir 450 milliGRAM(s) Oral <User Schedule>    MEDICATIONS  (PRN):  acetaminophen     Tablet .. 650 milliGRAM(s) Oral every 6 hours PRN Temp greater or equal to 38C (100.4F), Mild Pain (1 - 3)  melatonin 3 milliGRAM(s) Oral at bedtime PRN Insomnia      ALLERGIES:  Allergies    No Known Allergies    Intolerances        LABS:                        10.7   13.87 )-----------( 339      ( 11 Mar 2023 10:55 )             33.8     03-11    138  |  105  |  32<H>  ----------------------------<  178<H>  5.1   |  19<L>  |  1.53<H>    Ca    7.3<L>      11 Mar 2023 11:12  Phos  1.6     03-11  Mg     1.7     03-11            RADIOLOGY & ADDITIONAL TESTS: Reviewed.

## 2023-03-12 NOTE — PROGRESS NOTE ADULT - SUBJECTIVE AND OBJECTIVE BOX
Diabetes Follow up note:    Chief complaint: Steroid induced hyperglycemia    Interval Hx: BG values 150-300s over past 24 hours. Pt seen at bedside. Reports feeling well and tolerating POs. Feels he is not being given enough food with meals and feels hungry.  Has new glucometer at bedside, RN to teach pt how to use today. Pt continues to endorse that he knows how to use insulin pens and was able to demonstrate how to w/RN yesterday at bedside. DC planning ongoing.     Review of Systems:  General: denies pain  GI: Tolerating POs. Denies N/V/D/Abd pain  CV: Denies CP/SOB  ENDO: No S&Sx of hypoglycemia    MEDS:  atorvastatin 10 milliGRAM(s) Oral at bedtime  insulin glargine Injectable (LANTUS) 22 Unit(s) SubCutaneous at bedtime  insulin lispro (ADMELOG) corrective regimen sliding scale   SubCutaneous <User Schedule>  insulin lispro (ADMELOG) corrective regimen sliding scale   SubCutaneous three times a day before meals  insulin lispro Injectable (ADMELOG) 18 Unit(s) SubCutaneous before breakfast  insulin lispro Injectable (ADMELOG) 6 Unit(s) SubCutaneous before dinner  insulin lispro Injectable (ADMELOG) 6 Unit(s) SubCutaneous before lunch  predniSONE   Tablet 60 milliGRAM(s) Oral daily    nystatin    Suspension 422460 Unit(s) Oral <User Schedule>  trimethoprim   80 mG/sulfamethoxazole 400 mG 1 Tablet(s) Oral every 24 hours  valGANciclovir 450 milliGRAM(s) Oral daily    Allergies    No Known Allergies      PE:  General: Male sitting in chair. NAD>   Vital Signs Last 24 Hrs  T(C): 36.5 (12 Mar 2023 06:05), Max: 36.7 (11 Mar 2023 12:24)  T(F): 97.7 (12 Mar 2023 06:05), Max: 98.1 (11 Mar 2023 20:47)  HR: 103 (12 Mar 2023 06:05) (97 - 108)  BP: 128/90 (12 Mar 2023 06:05) (114/76 - 128/90)  BP(mean): --  RR: 18 (12 Mar 2023 06:05) (18 - 18)  SpO2: 98% (12 Mar 2023 06:05) (96% - 98%)    Parameters below as of 12 Mar 2023 06:05  Patient On (Oxygen Delivery Method): room air      Abd: Soft, NT,ND,   Extremities: Warm  Neuro: A&O X3    LABS:  POCT Blood Glucose.: 151 mg/dL (03-12-23 @ 08:59)  POCT Blood Glucose.: 196 mg/dL (03-12-23 @ 01:57)  POCT Blood Glucose.: 338 mg/dL (03-11-23 @ 21:27)  POCT Blood Glucose.: 210 mg/dL (03-11-23 @ 17:17)  POCT Blood Glucose.: 278 mg/dL (03-11-23 @ 12:51)  POCT Blood Glucose.: 151 mg/dL (03-11-23 @ 08:55)  POCT Blood Glucose.: 231 mg/dL (03-11-23 @ 03:30)  POCT Blood Glucose.: 218 mg/dL (03-10-23 @ 22:24)  POCT Blood Glucose.: 154 mg/dL (03-10-23 @ 16:56)  POCT Blood Glucose.: 274 mg/dL (03-10-23 @ 12:33)  POCT Blood Glucose.: 129 mg/dL (03-10-23 @ 08:40)  POCT Blood Glucose.: 172 mg/dL (03-10-23 @ 04:22)  POCT Blood Glucose.: 125 mg/dL (03-09-23 @ 21:53)  POCT Blood Glucose.: 89 mg/dL (03-09-23 @ 21:12)  POCT Blood Glucose.: 82 mg/dL (03-09-23 @ 17:06)  POCT Blood Glucose.: 248 mg/dL (03-09-23 @ 12:51)                            10.7   13.87 )-----------( 339      ( 11 Mar 2023 10:55 )             33.8       03-11    138  |  105  |  32<H>  ----------------------------<  178<H>  5.1   |  19<L>  |  1.53<H>    eGFR: 48<L>    Ca    7.3<L>      03-11  Mg     1.7     03-11  Phos  1.6     03-11        Thyroid Function Tests:  03-03 @ 15:09 TSH 1.06 FreeT4 -- T3 -- Anti TPO -- Anti Thyroglobulin Ab -- TSI --      A1C with Estimated Average Glucose Result: 4.7 % (02-17-23 @ 11:59)          Contact number: deanne [phone] or [phone]

## 2023-03-12 NOTE — PROGRESS NOTE ADULT - PROBLEM SELECTOR PLAN 1
P/w chest tightness x1d associated w/ dry cough; likely multifactorial in s/o atrial flutter w/ HR 140s, notes that his chest tightness is improving as HR is lowered. Also considering contribution from CT chest findings of impacted R middle and lower lobe bronchi w/ near collapse of R middle/lower lobes.   - RVP positive for human metapneumovirus   - Procal elevated to 2.39; neutrophilic predominance on diff with 1% bands in immunosuppresed   - continue abx: cefepime 1g q24h (3/4 - 2/8 ) --> adjusted for NORMAN  -EBV negative for active infection, MRSA negative  - fungitell > 300   -BCx positive for likley contaminant; f/u repeat   - treatment of aflutter as below    -Pulm consulted for possible bronch; f/u recs (aggressive Airway clearance since patient not hypoxic)  - Transplant ID following; f/u recs  - c/w ppx of bactrim and valcyclovir (redosed given improved Cr Cl)

## 2023-03-13 LAB
ANION GAP SERPL CALC-SCNC: 10 MMOL/L — SIGNIFICANT CHANGE UP (ref 5–17)
BUN SERPL-MCNC: 37 MG/DL — HIGH (ref 7–23)
C IMMITIS AB FLD QL CF: NEGATIVE — SIGNIFICANT CHANGE UP
C IMMITIS IGM SPEC QL IA: NEGATIVE — SIGNIFICANT CHANGE UP
CALCIUM SERPL-MCNC: 7.2 MG/DL — LOW (ref 8.4–10.5)
CHLORIDE SERPL-SCNC: 107 MMOL/L — SIGNIFICANT CHANGE UP (ref 96–108)
CO2 SERPL-SCNC: 24 MMOL/L — SIGNIFICANT CHANGE UP (ref 22–31)
COCCIDIOIDES IGG SPEC QL IA: NEGATIVE — SIGNIFICANT CHANGE UP
CREAT SERPL-MCNC: 1.56 MG/DL — HIGH (ref 0.5–1.3)
CULTURE RESULTS: NO GROWTH — SIGNIFICANT CHANGE UP
CULTURE RESULTS: SIGNIFICANT CHANGE UP
CULTURE RESULTS: SIGNIFICANT CHANGE UP
EGFR: 47 ML/MIN/1.73M2 — LOW
GLUCOSE BLDC GLUCOMTR-MCNC: 146 MG/DL — HIGH (ref 70–99)
GLUCOSE BLDC GLUCOMTR-MCNC: 177 MG/DL — HIGH (ref 70–99)
GLUCOSE BLDC GLUCOMTR-MCNC: 197 MG/DL — HIGH (ref 70–99)
GLUCOSE BLDC GLUCOMTR-MCNC: 227 MG/DL — HIGH (ref 70–99)
GLUCOSE BLDC GLUCOMTR-MCNC: 289 MG/DL — HIGH (ref 70–99)
GLUCOSE SERPL-MCNC: 151 MG/DL — HIGH (ref 70–99)
HCT VFR BLD CALC: 33.9 % — LOW (ref 39–50)
HGB BLD-MCNC: 10.7 G/DL — LOW (ref 13–17)
HMPV RNA SPEC QL NAA+PROBE: DETECTED
MAGNESIUM SERPL-MCNC: 1.7 MG/DL — SIGNIFICANT CHANGE UP (ref 1.6–2.6)
MCHC RBC-ENTMCNC: 31.6 GM/DL — LOW (ref 32–36)
MCHC RBC-ENTMCNC: 33.5 PG — SIGNIFICANT CHANGE UP (ref 27–34)
MCV RBC AUTO: 106.3 FL — HIGH (ref 80–100)
NRBC # BLD: 1 /100 WBCS — HIGH (ref 0–0)
PHOSPHATE SERPL-MCNC: 1.1 MG/DL — LOW (ref 2.5–4.5)
PLATELET # BLD AUTO: 355 K/UL — SIGNIFICANT CHANGE UP (ref 150–400)
POTASSIUM SERPL-MCNC: 4.9 MMOL/L — SIGNIFICANT CHANGE UP (ref 3.5–5.3)
POTASSIUM SERPL-SCNC: 4.9 MMOL/L — SIGNIFICANT CHANGE UP (ref 3.5–5.3)
RAPID RVP RESULT: DETECTED
RBC # BLD: 3.19 M/UL — LOW (ref 4.2–5.8)
RBC # FLD: 16.5 % — HIGH (ref 10.3–14.5)
SARS-COV-2 RNA SPEC QL NAA+PROBE: SIGNIFICANT CHANGE UP
SODIUM SERPL-SCNC: 141 MMOL/L — SIGNIFICANT CHANGE UP (ref 135–145)
SPECIMEN SOURCE: SIGNIFICANT CHANGE UP
WBC # BLD: 19.64 K/UL — HIGH (ref 3.8–10.5)
WBC # FLD AUTO: 19.64 K/UL — HIGH (ref 3.8–10.5)

## 2023-03-13 PROCEDURE — 99233 SBSQ HOSP IP/OBS HIGH 50: CPT

## 2023-03-13 PROCEDURE — 99233 SBSQ HOSP IP/OBS HIGH 50: CPT | Mod: GC

## 2023-03-13 PROCEDURE — 99232 SBSQ HOSP IP/OBS MODERATE 35: CPT

## 2023-03-13 RX ORDER — SODIUM,POTASSIUM PHOSPHATES 278-250MG
1 POWDER IN PACKET (EA) ORAL
Refills: 0 | Status: COMPLETED | OUTPATIENT
Start: 2023-03-13 | End: 2023-03-13

## 2023-03-13 RX ORDER — POTASSIUM PHOSPHATE, MONOBASIC POTASSIUM PHOSPHATE, DIBASIC 236; 224 MG/ML; MG/ML
15 INJECTION, SOLUTION INTRAVENOUS ONCE
Refills: 0 | Status: COMPLETED | OUTPATIENT
Start: 2023-03-13 | End: 2023-03-13

## 2023-03-13 RX ADMIN — Medication 1 MILLIGRAM(S): at 11:51

## 2023-03-13 RX ADMIN — Medication 200 MILLIGRAM(S): at 05:29

## 2023-03-13 RX ADMIN — Medication 500000 UNIT(S): at 17:58

## 2023-03-13 RX ADMIN — APIXABAN 5 MILLIGRAM(S): 2.5 TABLET, FILM COATED ORAL at 17:57

## 2023-03-13 RX ADMIN — Medication 500000 UNIT(S): at 11:53

## 2023-03-13 RX ADMIN — Medication 200 MILLIGRAM(S): at 05:34

## 2023-03-13 RX ADMIN — Medication 1 PACKET(S): at 11:54

## 2023-03-13 RX ADMIN — Medication 650 MILLIGRAM(S): at 06:01

## 2023-03-13 RX ADMIN — Medication 200 MILLIGRAM(S): at 17:58

## 2023-03-13 RX ADMIN — Medication 500000 UNIT(S): at 08:57

## 2023-03-13 RX ADMIN — Medication 500000 UNIT(S): at 21:54

## 2023-03-13 RX ADMIN — PANTOPRAZOLE SODIUM 40 MILLIGRAM(S): 20 TABLET, DELAYED RELEASE ORAL at 05:29

## 2023-03-13 RX ADMIN — Medication 60 MILLIGRAM(S): at 05:29

## 2023-03-13 RX ADMIN — Medication 6: at 17:59

## 2023-03-13 RX ADMIN — SIMETHICONE 80 MILLIGRAM(S): 80 TABLET, CHEWABLE ORAL at 11:51

## 2023-03-13 RX ADMIN — TACROLIMUS 1.5 MILLIGRAM(S): 5 CAPSULE ORAL at 21:51

## 2023-03-13 RX ADMIN — TACROLIMUS 1.5 MILLIGRAM(S): 5 CAPSULE ORAL at 08:57

## 2023-03-13 RX ADMIN — Medication 18 UNIT(S): at 08:55

## 2023-03-13 RX ADMIN — VALGANCICLOVIR 450 MILLIGRAM(S): 450 TABLET, FILM COATED ORAL at 11:52

## 2023-03-13 RX ADMIN — Medication 200 MILLIGRAM(S): at 11:52

## 2023-03-13 RX ADMIN — Medication 2: at 13:03

## 2023-03-13 RX ADMIN — ATORVASTATIN CALCIUM 10 MILLIGRAM(S): 80 TABLET, FILM COATED ORAL at 21:54

## 2023-03-13 RX ADMIN — Medication 6 UNIT(S): at 17:58

## 2023-03-13 RX ADMIN — Medication 1 PACKET(S): at 13:04

## 2023-03-13 RX ADMIN — APIXABAN 5 MILLIGRAM(S): 2.5 TABLET, FILM COATED ORAL at 05:29

## 2023-03-13 RX ADMIN — Medication 6 UNIT(S): at 13:04

## 2023-03-13 RX ADMIN — Medication 1 TABLET(S): at 11:51

## 2023-03-13 RX ADMIN — Medication 81 MILLIGRAM(S): at 11:52

## 2023-03-13 RX ADMIN — INSULIN GLARGINE 22 UNIT(S): 100 INJECTION, SOLUTION SUBCUTANEOUS at 21:52

## 2023-03-13 RX ADMIN — SENNA PLUS 1 TABLET(S): 8.6 TABLET ORAL at 11:52

## 2023-03-13 NOTE — PROGRESS NOTE ADULT - SUBJECTIVE AND OBJECTIVE BOX
seen earlier today     Chief Complaint: Type 2 Diabetes Mellitus     INTERVAL HX: pt voicing frustration about remaining admitted , reports he uses a glucometer at home an dused to be on insulin, states he has not been shown how to do glucometer or insulin pen however d/w RN patient has been educated on pen/injection/glucometer  , patient demonstrated glucometer use to writer       Review of Systems:  General: As above  Cardiovascular: No chest pain  Respiratory: No SOB  GI: No nausea, vomiting  Endocrine: no  S&Sx of hypoglycemia    Allergies    No Known Allergies    Intolerances      MEDICATIONS  (STANDING):  albuterol/ipratropium for Nebulization 3 milliLiter(s) Nebulizer every 6 hours  apixaban 5 milliGRAM(s) Oral every 12 hours  aspirin enteric coated 81 milliGRAM(s) Oral daily  atorvastatin 10 milliGRAM(s) Oral at bedtime  dextrose 5%. 1000 milliLiter(s) (50 mL/Hr) IV Continuous <Continuous>  dextrose 5%. 1000 milliLiter(s) (100 mL/Hr) IV Continuous <Continuous>  dextrose 50% Injectable 25 Gram(s) IV Push once  dextrose 50% Injectable 12.5 Gram(s) IV Push once  dextrose 50% Injectable 25 Gram(s) IV Push once  dextrose Oral Gel 15 Gram(s) Oral once  folic acid 1 milliGRAM(s) Oral daily  glucagon  Injectable 1 milliGRAM(s) IntraMuscular once  guaiFENesin Oral Liquid (Sugar-Free) 200 milliGRAM(s) Oral every 6 hours  insulin glargine Injectable (LANTUS) 22 Unit(s) SubCutaneous at bedtime  insulin lispro (ADMELOG) corrective regimen sliding scale   SubCutaneous three times a day before meals  insulin lispro (ADMELOG) corrective regimen sliding scale   SubCutaneous <User Schedule>  insulin lispro Injectable (ADMELOG) 18 Unit(s) SubCutaneous before breakfast  insulin lispro Injectable (ADMELOG) 6 Unit(s) SubCutaneous before dinner  insulin lispro Injectable (ADMELOG) 6 Unit(s) SubCutaneous before lunch  metoprolol succinate  milliGRAM(s) Oral daily  nystatin    Suspension 716020 Unit(s) Oral <User Schedule>  pantoprazole    Tablet 40 milliGRAM(s) Oral before breakfast  polyethylene glycol 3350 17 Gram(s) Oral daily  predniSONE   Tablet 60 milliGRAM(s) Oral daily  senna 1 Tablet(s) Oral daily  simethicone 80 milliGRAM(s) Chew daily  sodium chloride 3%  Inhalation 4 milliLiter(s) Inhalation every 12 hours  tacrolimus 1.5 milliGRAM(s) Oral <User Schedule>  trimethoprim   80 mG/sulfamethoxazole 400 mG 1 Tablet(s) Oral every 24 hours  valGANciclovir 450 milliGRAM(s) Oral daily      atorvastatin   10 milliGRAM(s) Oral (03-12-23 @ 22:02)    insulin glargine Injectable (LANTUS)   22 Unit(s) SubCutaneous (03-12-23 @ 22:02)    insulin lispro (ADMELOG) corrective regimen sliding scale   2 Unit(s) SubCutaneous (03-13-23 @ 13:03)   2 Unit(s) SubCutaneous (03-12-23 @ 17:39)    insulin lispro Injectable (ADMELOG)   18 Unit(s) SubCutaneous (03-13-23 @ 08:55)    insulin lispro Injectable (ADMELOG)   6 Unit(s) SubCutaneous (03-12-23 @ 17:40)    insulin lispro Injectable (ADMELOG)   6 Unit(s) SubCutaneous (03-13-23 @ 13:04)    predniSONE   Tablet   60 milliGRAM(s) Oral (03-13-23 @ 05:29)        PHYSICAL EXAM:  VITALS: T(C): 36.8 (03-13-23 @ 11:00)  T(F): 98.2 (03-13-23 @ 11:00), Max: 98.3 (03-12-23 @ 16:39)  HR: 95 (03-13-23 @ 11:00) (95 - 108)  BP: 126/87 (03-13-23 @ 11:00) (126/87 - 149/97)  RR:  (18 - 18)  SpO2:  (97% - 100%)  Wt(kg): --  GENERAL: male sitting in chair  in NAD  Respiratory: Respirations unlabored   Extremities: Warm, no edema  NEURO: Alert , appropriate     LABS:  POCT Blood Glucose.: 177 mg/dL (03-13-23 @ 12:53)  POCT Blood Glucose.: 146 mg/dL (03-13-23 @ 08:41)  POCT Blood Glucose.: 227 mg/dL (03-13-23 @ 02:35)  POCT Blood Glucose.: 225 mg/dL (03-12-23 @ 21:30)  POCT Blood Glucose.: 195 mg/dL (03-12-23 @ 17:29)  POCT Blood Glucose.: 205 mg/dL (03-12-23 @ 12:36)  POCT Blood Glucose.: 151 mg/dL (03-12-23 @ 08:59)  POCT Blood Glucose.: 196 mg/dL (03-12-23 @ 01:57)  POCT Blood Glucose.: 338 mg/dL (03-11-23 @ 21:27)  POCT Blood Glucose.: 210 mg/dL (03-11-23 @ 17:17)  POCT Blood Glucose.: 278 mg/dL (03-11-23 @ 12:51)  POCT Blood Glucose.: 151 mg/dL (03-11-23 @ 08:55)  POCT Blood Glucose.: 231 mg/dL (03-11-23 @ 03:30)  POCT Blood Glucose.: 218 mg/dL (03-10-23 @ 22:24)  POCT Blood Glucose.: 154 mg/dL (03-10-23 @ 16:56)                          10.7   19.64 )-----------( 355      ( 13 Mar 2023 09:03 )             33.9     03-13    141  |  107  |  37<H>  ----------------------------<  151<H>  4.9   |  24  |  1.56<H>    Ca    7.2<L>      13 Mar 2023 09:03  Phos  1.1     03-13  Mg     1.7     03-13      eGFR: 47 mL/min/1.73m2 (13 Mar 2023 09:03)    02-17 Chol 143 Direct LDL -- LDL calculated 51 HDL 69 Trig 116  Thyroid Function Tests:  03-03 @ 15:09 TSH 1.06 FreeT4 -- T3 -- Anti TPO -- Anti Thyroglobulin Ab -- TSI --      A1C with Estimated Average Glucose Result: 4.7 % (02-17-23 @ 11:59)    Estimated Average Glucose: 88 mg/dL (02-17-23 @ 11:59)        Diet, Regular:   Consistent Carbohydrate No Snacks (CSTCHO)  DASH/TLC Sodium & Cholesterol Restricted (DASH) (03-04-23 @ 11:54) [Active]              seen earlier today     Chief Complaint: Type 2 Diabetes Mellitus     INTERVAL HX: pt voicing frustration about remaining admitted , reports he uses a glucometer at home an dused to be on insulin, states he has not been shown how to do glucometer or insulin pen however d/w RN patient has been educated on pen/injection/glucometer  , patient demonstrated glucometer use to writer   observed open consumed pack of fignewtons, patient had as a snack between lunch/dinner       Review of Systems:  General: As above  Cardiovascular: No chest pain  Respiratory: No SOB  GI: No nausea, vomiting  Endocrine: no  S&Sx of hypoglycemia    Allergies    No Known Allergies    Intolerances      MEDICATIONS  (STANDING):  albuterol/ipratropium for Nebulization 3 milliLiter(s) Nebulizer every 6 hours  apixaban 5 milliGRAM(s) Oral every 12 hours  aspirin enteric coated 81 milliGRAM(s) Oral daily  atorvastatin 10 milliGRAM(s) Oral at bedtime  dextrose 5%. 1000 milliLiter(s) (50 mL/Hr) IV Continuous <Continuous>  dextrose 5%. 1000 milliLiter(s) (100 mL/Hr) IV Continuous <Continuous>  dextrose 50% Injectable 25 Gram(s) IV Push once  dextrose 50% Injectable 12.5 Gram(s) IV Push once  dextrose 50% Injectable 25 Gram(s) IV Push once  dextrose Oral Gel 15 Gram(s) Oral once  folic acid 1 milliGRAM(s) Oral daily  glucagon  Injectable 1 milliGRAM(s) IntraMuscular once  guaiFENesin Oral Liquid (Sugar-Free) 200 milliGRAM(s) Oral every 6 hours  insulin glargine Injectable (LANTUS) 22 Unit(s) SubCutaneous at bedtime  insulin lispro (ADMELOG) corrective regimen sliding scale   SubCutaneous three times a day before meals  insulin lispro (ADMELOG) corrective regimen sliding scale   SubCutaneous <User Schedule>  insulin lispro Injectable (ADMELOG) 18 Unit(s) SubCutaneous before breakfast  insulin lispro Injectable (ADMELOG) 6 Unit(s) SubCutaneous before dinner  insulin lispro Injectable (ADMELOG) 6 Unit(s) SubCutaneous before lunch  metoprolol succinate  milliGRAM(s) Oral daily  nystatin    Suspension 862922 Unit(s) Oral <User Schedule>  pantoprazole    Tablet 40 milliGRAM(s) Oral before breakfast  polyethylene glycol 3350 17 Gram(s) Oral daily  predniSONE   Tablet 60 milliGRAM(s) Oral daily  senna 1 Tablet(s) Oral daily  simethicone 80 milliGRAM(s) Chew daily  sodium chloride 3%  Inhalation 4 milliLiter(s) Inhalation every 12 hours  tacrolimus 1.5 milliGRAM(s) Oral <User Schedule>  trimethoprim   80 mG/sulfamethoxazole 400 mG 1 Tablet(s) Oral every 24 hours  valGANciclovir 450 milliGRAM(s) Oral daily      atorvastatin   10 milliGRAM(s) Oral (03-12-23 @ 22:02)    insulin glargine Injectable (LANTUS)   22 Unit(s) SubCutaneous (03-12-23 @ 22:02)    insulin lispro (ADMELOG) corrective regimen sliding scale   2 Unit(s) SubCutaneous (03-13-23 @ 13:03)   2 Unit(s) SubCutaneous (03-12-23 @ 17:39)    insulin lispro Injectable (ADMELOG)   18 Unit(s) SubCutaneous (03-13-23 @ 08:55)    insulin lispro Injectable (ADMELOG)   6 Unit(s) SubCutaneous (03-12-23 @ 17:40)    insulin lispro Injectable (ADMELOG)   6 Unit(s) SubCutaneous (03-13-23 @ 13:04)    predniSONE   Tablet   60 milliGRAM(s) Oral (03-13-23 @ 05:29)        PHYSICAL EXAM:  VITALS: T(C): 36.8 (03-13-23 @ 11:00)  T(F): 98.2 (03-13-23 @ 11:00), Max: 98.3 (03-12-23 @ 16:39)  HR: 95 (03-13-23 @ 11:00) (95 - 108)  BP: 126/87 (03-13-23 @ 11:00) (126/87 - 149/97)  RR:  (18 - 18)  SpO2:  (97% - 100%)  Wt(kg): --  GENERAL: male sitting in chair  in NAD  Respiratory: Respirations unlabored   Extremities: Warm, no edema  NEURO: Alert , appropriate     LABS:  POCT Blood Glucose.: 177 mg/dL (03-13-23 @ 12:53)  POCT Blood Glucose.: 146 mg/dL (03-13-23 @ 08:41)  POCT Blood Glucose.: 227 mg/dL (03-13-23 @ 02:35)  POCT Blood Glucose.: 225 mg/dL (03-12-23 @ 21:30)  POCT Blood Glucose.: 195 mg/dL (03-12-23 @ 17:29)  POCT Blood Glucose.: 205 mg/dL (03-12-23 @ 12:36)  POCT Blood Glucose.: 151 mg/dL (03-12-23 @ 08:59)  POCT Blood Glucose.: 196 mg/dL (03-12-23 @ 01:57)  POCT Blood Glucose.: 338 mg/dL (03-11-23 @ 21:27)  POCT Blood Glucose.: 210 mg/dL (03-11-23 @ 17:17)  POCT Blood Glucose.: 278 mg/dL (03-11-23 @ 12:51)  POCT Blood Glucose.: 151 mg/dL (03-11-23 @ 08:55)  POCT Blood Glucose.: 231 mg/dL (03-11-23 @ 03:30)  POCT Blood Glucose.: 218 mg/dL (03-10-23 @ 22:24)  POCT Blood Glucose.: 154 mg/dL (03-10-23 @ 16:56)                          10.7   19.64 )-----------( 355      ( 13 Mar 2023 09:03 )             33.9     03-13    141  |  107  |  37<H>  ----------------------------<  151<H>  4.9   |  24  |  1.56<H>    Ca    7.2<L>      13 Mar 2023 09:03  Phos  1.1     03-13  Mg     1.7     03-13      eGFR: 47 mL/min/1.73m2 (13 Mar 2023 09:03)    02-17 Chol 143 Direct LDL -- LDL calculated 51 HDL 69 Trig 116  Thyroid Function Tests:  03-03 @ 15:09 TSH 1.06 FreeT4 -- T3 -- Anti TPO -- Anti Thyroglobulin Ab -- TSI --      A1C with Estimated Average Glucose Result: 4.7 % (02-17-23 @ 11:59)    Estimated Average Glucose: 88 mg/dL (02-17-23 @ 11:59)        Diet, Regular:   Consistent Carbohydrate No Snacks (CSTCHO)  DASH/TLC Sodium & Cholesterol Restricted (DASH) (03-04-23 @ 11:54) [Active]

## 2023-03-13 NOTE — PROGRESS NOTE ADULT - ASSESSMENT
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 He is now back in sinus tachycardia. Course c/b by URI w/ human metapneumovirus. Would assess for superimposed infection given immunosuppression. Afib/flutter would be concerning for rejection, but repeat echo here with normal LV function. Also noted to have acute on chronic kidney injury likely 2/2 hypovolemia.

## 2023-03-13 NOTE — PROGRESS NOTE ADULT - ASSESSMENT
71y/o M w/h/o HTN, HLD, NICM, chronic systolic heart failure s/p HM2 LVAD (6/2017) s/p heart transplant from Hep. C donor (treated) 2/23/18 (post op course complicated by graft dysfunction treated by plasmapheresis, IVIG, and rituximab). No DM hx per pt report. Here w/ chest tightness x1d found to be in aflutter 2/2 pneumonia and more recently found to have Hemolytic anemia> on steroids. Endocrinology consulted for management steroid induced hyperglycemia. Tolerating POs. DC planning, glucometer teaching done by RN & Redone by this writer today, observed patient perform POC FS with glucometer he will be dc with. Previously pen reviewed pt able to give teachback to RN. DC plan for basal/bolus. BG goal (100-180mg/dl).      Steroid-induced hyperglycemia.   ·  Plan: -test BG AC/HS  -c/w Lantus 22 units QHS  -Adjust Admelog 18-6-6 w/meals. HOLD IF NOT EATING   -c/w Admelog moderate correction scale AC and mod HS scale for now  -cons carb diet  -On prednisone 60mg PO daily. Notify endocrine team if this is changed.   - Insulin pen/glucometer review w/RN  Discharge plan:  -Will be determined according to BG/insulin needs/PO intake and steroid therapy at time of discharge.  Send RX for Lantus solostar Pen 22 units QHS and Admelog pen 18-6-6 w/meals  -Pt has new glucometer w/supplies at bedside-needs RN to review glucometer  - Home care due to new DM diagnosis and likely need of insulin therapy  - Patient to call doctor with persistent high or low BG at home.   - Recommend routine outpatient ophthalmology, podiatry   - Will need endocrinology f/u if pt send home on insulin/high dose steroids Can follow at [facility]. [address] [place] [address] [address]. Phone .   1) March 28 11am w/NP  2) Aug 15th 10:45am w/Dr Celmens.     Problem/Plan - 2:  ·  Problem: HTN (hypertension).   ·  Plan: Outpatient goal BP <130/80.   - Management per primary team.     Problem/Plan - 3:  ·  Problem: HLD (hyperlipidemia).   ·  Plan: Home regimen: pravastatin 20mg daily  - LDL goal <70 . Pt LDL 51  - Continue atorvastatin 10mg daily while in patient  - Restart home statin upon discharge if not contraindicated.          discussed with patient and primary team Dr Birmingham & RN  Contact via Microsoft Teams during business hours  To reach covering provider access AMION via sunrise tools  For Urgent matters/after-hours/weekends/holidays please page endocrine fellow on call   For nonurgent matters please email [email or web address]    Please note that this patient may be followed by different provider tomorrow.  Notify endocrine 24 hours prior to discharge for final recommendations    greater than 50% of the encounter was spent counseling and/or coordination of care.  26 minutes spent on total encounter; The necessity of the time spent during the encounter on this date of service was due to development of plan of care/coordination of care/glycemic control through review of labs, blood glucose values and vital signs.  71y/o M w/h/o HTN, HLD, NICM, chronic systolic heart failure s/p HM2 LVAD (6/2017) s/p heart transplant from Hep. C donor (treated) 2/23/18 (post op course complicated by graft dysfunction treated by plasmapheresis, IVIG, and rituximab). No DM hx per pt report. Here w/ chest tightness x1d found to be in aflutter 2/2 pneumonia and more recently found to have Hemolytic anemia> on steroids. Endocrinology consulted for management steroid induced hyperglycemia. Tolerating POs. DC planning, glucometer teaching done by RN & Redone by this writer today, observed patient perform POC FS with glucometer he will be dc with. Previously pen reviewed pt able to give teachback to RN. DC plan for basal/bolus. BG goal (100-180mg/dl).      Steroid-induced hyperglycemia.   ·  Plan: -test BG AC/HS  -c/w Lantus 22 units QHS  -Adjust Admelog 18-6-6 w/meals. HOLD IF NOT EATING , had fig newtons cookies b/w lunch & dinner expect BG at dinner to be elevated today  -c/w Admelog moderate correction scale AC and mod HS scale for now  -cons carb diet  -On prednisone 60mg PO daily. Notify endocrine team if this is changed.   - Insulin pen/glucometer review w/RN  Discharge plan:  -Will be determined according to BG/insulin needs/PO intake and steroid therapy at time of discharge.  Send RX for Lantus solostar Pen 22 units QHS and Admelog pen 18-6-6 w/meals  -Pt has new glucometer w/supplies at bedside-needs RN to review glucometer  - Home care due to new DM diagnosis and likely need of insulin therapy  - Patient to call doctor with persistent high or low BG at home.   - Recommend routine outpatient ophthalmology, podiatry   - Will need endocrinology f/u if pt send home on insulin/high dose steroids Can follow at [facility]. [address] [place] [address] [address]. Phone .   1) March 28 11am w/NP  2) Aug 15th 10:45am w/Dr Clemens.     Problem/Plan - 2:  ·  Problem: HTN (hypertension).   ·  Plan: Outpatient goal BP <130/80.   - Management per primary team.     Problem/Plan - 3:  ·  Problem: HLD (hyperlipidemia).   ·  Plan: Home regimen: pravastatin 20mg daily  - LDL goal <70 . Pt LDL 51  - Continue atorvastatin 10mg daily while in patient  - Restart home statin upon discharge if not contraindicated.          discussed with patient and primary team Dr Birmingham & RN  Contact via Microsoft Teams during business hours  To reach covering provider access AMION via sunrise tools  For Urgent matters/after-hours/weekends/holidays please page endocrine fellow on call   For nonurgent matters please email [email or web address]    Please note that this patient may be followed by different provider tomorrow.  Notify endocrine 24 hours prior to discharge for final recommendations    greater than 50% of the encounter was spent counseling and/or coordination of care.  26 minutes spent on total encounter; The necessity of the time spent during the encounter on this date of service was due to development of plan of care/coordination of care/glycemic control through review of labs, blood glucose values and vital signs.

## 2023-03-13 NOTE — PROGRESS NOTE ADULT - SUBJECTIVE AND OBJECTIVE BOX
INTERVAL HPI/OVERNIGHT EVENTS:    SUBJECTIVE: Patient seen and examined at bedside.    OBJECTIVE:    VITAL SIGNS:  ICU Vital Signs Last 24 Hrs  T(C): 36.6 (13 Mar 2023 05:47), Max: 36.8 (12 Mar 2023 16:39)  T(F): 97.9 (13 Mar 2023 05:47), Max: 98.3 (12 Mar 2023 16:39)  HR: 98 (13 Mar 2023 05:47) (98 - 108)  BP: 149/97 (13 Mar 2023 05:47) (125/85 - 149/97)  BP(mean): --  ABP: --  ABP(mean): --  RR: 18 (13 Mar 2023 05:47) (17 - 18)  SpO2: 100% (13 Mar 2023 05:47) (97% - 100%)    O2 Parameters below as of 13 Mar 2023 05:47  Patient On (Oxygen Delivery Method): room air              03-12 @ 07:01  -  03-13 @ 07:00  --------------------------------------------------------  IN: 700 mL / OUT: 400 mL / NET: 300 mL      CAPILLARY BLOOD GLUCOSE      POCT Blood Glucose.: 227 mg/dL (13 Mar 2023 02:35)      PHYSICAL EXAM:    General: NAD  HEENT: NC/AT; PERRL, clear conjunctiva  Neck: supple  Respiratory: CTA b/l  Cardiovascular: +S1/S2; RRR  Abdomen: soft, NT/ND; +BS x4  Extremities:  no LE edema  Vascular: WWP, 2+ peripheral pulses b/l;  Skin: normal color and turgor; no rash  Neurological: A&Ox3, move all extremities. CN II-XII intact    MEDICATIONS:  MEDICATIONS  (STANDING):  albuterol/ipratropium for Nebulization 3 milliLiter(s) Nebulizer every 6 hours  apixaban 5 milliGRAM(s) Oral every 12 hours  aspirin enteric coated 81 milliGRAM(s) Oral daily  atorvastatin 10 milliGRAM(s) Oral at bedtime  dextrose 5%. 1000 milliLiter(s) (50 mL/Hr) IV Continuous <Continuous>  dextrose 5%. 1000 milliLiter(s) (100 mL/Hr) IV Continuous <Continuous>  dextrose 50% Injectable 25 Gram(s) IV Push once  dextrose 50% Injectable 12.5 Gram(s) IV Push once  dextrose 50% Injectable 25 Gram(s) IV Push once  dextrose Oral Gel 15 Gram(s) Oral once  folic acid 1 milliGRAM(s) Oral daily  glucagon  Injectable 1 milliGRAM(s) IntraMuscular once  guaiFENesin Oral Liquid (Sugar-Free) 200 milliGRAM(s) Oral every 6 hours  insulin glargine Injectable (LANTUS) 22 Unit(s) SubCutaneous at bedtime  insulin lispro (ADMELOG) corrective regimen sliding scale   SubCutaneous three times a day before meals  insulin lispro (ADMELOG) corrective regimen sliding scale   SubCutaneous <User Schedule>  insulin lispro Injectable (ADMELOG) 6 Unit(s) SubCutaneous before dinner  insulin lispro Injectable (ADMELOG) 6 Unit(s) SubCutaneous before lunch  insulin lispro Injectable (ADMELOG) 18 Unit(s) SubCutaneous before breakfast  metoprolol succinate  milliGRAM(s) Oral daily  nystatin    Suspension 198491 Unit(s) Oral <User Schedule>  pantoprazole    Tablet 40 milliGRAM(s) Oral before breakfast  polyethylene glycol 3350 17 Gram(s) Oral daily  predniSONE   Tablet 60 milliGRAM(s) Oral daily  senna 1 Tablet(s) Oral daily  simethicone 80 milliGRAM(s) Chew daily  sodium chloride 3%  Inhalation 4 milliLiter(s) Inhalation every 12 hours  tacrolimus 1.5 milliGRAM(s) Oral <User Schedule>  trimethoprim   80 mG/sulfamethoxazole 400 mG 1 Tablet(s) Oral every 24 hours  valGANciclovir 450 milliGRAM(s) Oral daily    MEDICATIONS  (PRN):  acetaminophen     Tablet .. 650 milliGRAM(s) Oral every 6 hours PRN Temp greater or equal to 38C (100.4F), Mild Pain (1 - 3)  melatonin 3 milliGRAM(s) Oral at bedtime PRN Insomnia      ALLERGIES:  Allergies    No Known Allergies    Intolerances        LABS:                        10.7   17.69 )-----------( 392      ( 12 Mar 2023 11:55 )             34.0     03-12    142  |  109<H>  |  32<H>  ----------------------------<  187<H>  4.3   |  22  |  1.58<H>    Ca    7.3<L>      12 Mar 2023 11:55  Phos  1.1     -  Mg     1.6     -12        Urinalysis Basic - ( 12 Mar 2023 17:13 )    Color: Yellow / Appearance: Clear / S.029 / pH: x  Gluc: x / Ketone: Negative  / Bili: Negative / Urobili: Negative   Blood: x / Protein: 30 mg/dL / Nitrite: Negative   Leuk Esterase: Negative / RBC: 2 /hpf / WBC 2 /HPF   Sq Epi: x / Non Sq Epi: 1 /hpf / Bacteria: Negative        RADIOLOGY & ADDITIONAL TESTS: Reviewed. INTERVAL HPI/OVERNIGHT EVENTS: KEVIN OVN     SUBJECTIVE: Patient seen and examined at bedside. Patient frustrated this AM that he is still here. Endorses he does not feel comfortable with insulin administration.     OBJECTIVE:    VITAL SIGNS:  ICU Vital Signs Last 24 Hrs  T(C): 36.6 (13 Mar 2023 05:47), Max: 36.8 (12 Mar 2023 16:39)  T(F): 97.9 (13 Mar 2023 05:47), Max: 98.3 (12 Mar 2023 16:39)  HR: 98 (13 Mar 2023 05:47) (98 - 108)  BP: 149/97 (13 Mar 2023 05:47) (125/85 - 149/97)  BP(mean): --  ABP: --  ABP(mean): --  RR: 18 (13 Mar 2023 05:47) (17 - 18)  SpO2: 100% (13 Mar 2023 05:47) (97% - 100%)    O2 Parameters below as of 13 Mar 2023 05:47  Patient On (Oxygen Delivery Method): room air              03-12 @ 07:01  -  03-13 @ 07:00  --------------------------------------------------------  IN: 700 mL / OUT: 400 mL / NET: 300 mL      CAPILLARY BLOOD GLUCOSE      POCT Blood Glucose.: 227 mg/dL (13 Mar 2023 02:35)      PHYSICAL EXAM: Patient refusing full physical exam today    General: NAD    MEDICATIONS:  MEDICATIONS  (STANDING):  albuterol/ipratropium for Nebulization 3 milliLiter(s) Nebulizer every 6 hours  apixaban 5 milliGRAM(s) Oral every 12 hours  aspirin enteric coated 81 milliGRAM(s) Oral daily  atorvastatin 10 milliGRAM(s) Oral at bedtime  dextrose 5%. 1000 milliLiter(s) (50 mL/Hr) IV Continuous <Continuous>  dextrose 5%. 1000 milliLiter(s) (100 mL/Hr) IV Continuous <Continuous>  dextrose 50% Injectable 25 Gram(s) IV Push once  dextrose 50% Injectable 12.5 Gram(s) IV Push once  dextrose 50% Injectable 25 Gram(s) IV Push once  dextrose Oral Gel 15 Gram(s) Oral once  folic acid 1 milliGRAM(s) Oral daily  glucagon  Injectable 1 milliGRAM(s) IntraMuscular once  guaiFENesin Oral Liquid (Sugar-Free) 200 milliGRAM(s) Oral every 6 hours  insulin glargine Injectable (LANTUS) 22 Unit(s) SubCutaneous at bedtime  insulin lispro (ADMELOG) corrective regimen sliding scale   SubCutaneous three times a day before meals  insulin lispro (ADMELOG) corrective regimen sliding scale   SubCutaneous <User Schedule>  insulin lispro Injectable (ADMELOG) 6 Unit(s) SubCutaneous before dinner  insulin lispro Injectable (ADMELOG) 6 Unit(s) SubCutaneous before lunch  insulin lispro Injectable (ADMELOG) 18 Unit(s) SubCutaneous before breakfast  metoprolol succinate  milliGRAM(s) Oral daily  nystatin    Suspension 289013 Unit(s) Oral <User Schedule>  pantoprazole    Tablet 40 milliGRAM(s) Oral before breakfast  polyethylene glycol 3350 17 Gram(s) Oral daily  predniSONE   Tablet 60 milliGRAM(s) Oral daily  senna 1 Tablet(s) Oral daily  simethicone 80 milliGRAM(s) Chew daily  sodium chloride 3%  Inhalation 4 milliLiter(s) Inhalation every 12 hours  tacrolimus 1.5 milliGRAM(s) Oral <User Schedule>  trimethoprim   80 mG/sulfamethoxazole 400 mG 1 Tablet(s) Oral every 24 hours  valGANciclovir 450 milliGRAM(s) Oral daily    MEDICATIONS  (PRN):  acetaminophen     Tablet .. 650 milliGRAM(s) Oral every 6 hours PRN Temp greater or equal to 38C (100.4F), Mild Pain (1 - 3)  melatonin 3 milliGRAM(s) Oral at bedtime PRN Insomnia      ALLERGIES:  Allergies    No Known Allergies    Intolerances        LABS:                        10.7   17.69 )-----------( 392      ( 12 Mar 2023 11:55 )             34.0     03-12    142  |  109<H>  |  32<H>  ----------------------------<  187<H>  4.3   |  22  |  1.58<H>    Ca    7.3<L>      12 Mar 2023 11:55  Phos  1.1     03-12  Mg     1.6     03-12        Urinalysis Basic - ( 12 Mar 2023 17:13 )    Color: Yellow / Appearance: Clear / S.029 / pH: x  Gluc: x / Ketone: Negative  / Bili: Negative / Urobili: Negative   Blood: x / Protein: 30 mg/dL / Nitrite: Negative   Leuk Esterase: Negative / RBC: 2 /hpf / WBC 2 /HPF   Sq Epi: x / Non Sq Epi: 1 /hpf / Bacteria: Negative        RADIOLOGY & ADDITIONAL TESTS: Reviewed.

## 2023-03-13 NOTE — PROGRESS NOTE ADULT - ASSESSMENT
71 year old M with a PMH of HTN, NICM c/b HFrEF s/p LVAD 2017, and then subsequently s/p heart transplant from HCV donor (treated) in 2018 with post-op course complicated by graft dysfunction, who initially presented to the ED with cough and chest tightness.     Cough  Afebrile, no leukocytosis  RVP + for hMPV   CT with impacted RML and RLL bronchi, and patchy consolidations in RUL and LLL  Initially on Vancomycin and Cefepime   Now on Cefepime only (MRSA PCR negative)  Legionella Ur Ag negative, serum Cryptococcal Ag negative, EBV IgM negative  UA negative  Blood cultures from March 3rd with 1/4 bottles with Micrococcus Luteus   Fungitell 300  Procalcitonin 2.39, Cr 2->3 (NORMAN)  On Valcyte and Bactrim prophylaxis     OVERALL  hMPV pneumonia in heart transplant/immunocompromised recipient   R/o superimposed bacterial infection   Elevated Fungitell   1/4 BCx bottles with Micrococcus   NORMAN, s/p heart transplant 2018  H/o HCV from donor s/p treatment   H/o C Diff in 2020     Treated with broad spectrum abx from 3/3 to 3/8 (zosyn then cefepime)  Non toxic appearing    1) Leukocytosis  DDx is broad- steroids may be contributing  Repeat blood cultures  Monitor clinically    2) Abnormal chest imaging  Chest x ray with enlarging effusion   Prior chest CT with mucus plugging    IF WBC continues to rise, consider repeat CT chest and pulmonary re-evaluation    Can observe off empiric antibiotics at this time

## 2023-03-13 NOTE — PROGRESS NOTE ADULT - SUBJECTIVE AND OBJECTIVE BOX
Follow Up:      Inverval History/ROS:Patient is a 72y old  Male who presents with a chief complaint of a flutter (13 Mar 2023 07:39)    OOB to chair. Denies sob. No diarrhea. No pain at IV sites  No dysuria    Does not abdominal distention that has been ongoing sice his transplant.       Allergies    No Known Allergies    Intolerances        ANTIMICROBIALS:  nystatin    Suspension 850883 <User Schedule>  trimethoprim   80 mG/sulfamethoxazole 400 mG 1 every 24 hours  valGANciclovir 450 daily      OTHER MEDS:  acetaminophen     Tablet .. 650 milliGRAM(s) Oral every 6 hours PRN  albuterol/ipratropium for Nebulization 3 milliLiter(s) Nebulizer every 6 hours  apixaban 5 milliGRAM(s) Oral every 12 hours  aspirin enteric coated 81 milliGRAM(s) Oral daily  atorvastatin 10 milliGRAM(s) Oral at bedtime  dextrose 5%. 1000 milliLiter(s) IV Continuous <Continuous>  dextrose 5%. 1000 milliLiter(s) IV Continuous <Continuous>  dextrose 50% Injectable 25 Gram(s) IV Push once  dextrose 50% Injectable 12.5 Gram(s) IV Push once  dextrose 50% Injectable 25 Gram(s) IV Push once  dextrose Oral Gel 15 Gram(s) Oral once  folic acid 1 milliGRAM(s) Oral daily  glucagon  Injectable 1 milliGRAM(s) IntraMuscular once  guaiFENesin Oral Liquid (Sugar-Free) 200 milliGRAM(s) Oral every 6 hours  insulin glargine Injectable (LANTUS) 22 Unit(s) SubCutaneous at bedtime  insulin lispro (ADMELOG) corrective regimen sliding scale   SubCutaneous three times a day before meals  insulin lispro (ADMELOG) corrective regimen sliding scale   SubCutaneous <User Schedule>  insulin lispro Injectable (ADMELOG) 18 Unit(s) SubCutaneous before breakfast  insulin lispro Injectable (ADMELOG) 6 Unit(s) SubCutaneous before dinner  insulin lispro Injectable (ADMELOG) 6 Unit(s) SubCutaneous before lunch  melatonin 3 milliGRAM(s) Oral at bedtime PRN  metoprolol succinate  milliGRAM(s) Oral daily  pantoprazole    Tablet 40 milliGRAM(s) Oral before breakfast  polyethylene glycol 3350 17 Gram(s) Oral daily  potassium phosphate / sodium phosphate Powder (PHOS-NaK) 1 Packet(s) Oral every 2 hours  predniSONE   Tablet 60 milliGRAM(s) Oral daily  senna 1 Tablet(s) Oral daily  simethicone 80 milliGRAM(s) Chew daily  sodium chloride 3%  Inhalation 4 milliLiter(s) Inhalation every 12 hours  tacrolimus 1.5 milliGRAM(s) Oral <User Schedule>      Vital Signs Last 24 Hrs  T(C): 36.8 (13 Mar 2023 11:00), Max: 36.8 (12 Mar 2023 16:39)  T(F): 98.2 (13 Mar 2023 11:00), Max: 98.3 (12 Mar 2023 16:39)  HR: 95 (13 Mar 2023 11:00) (95 - 108)  BP: 126/87 (13 Mar 2023 11:00) (126/87 - 149/97)  BP(mean): 100 (13 Mar 2023 11:00) (100 - 100)  RR: 18 (13 Mar 2023 11:00) (18 - 18)  SpO2: 100% (13 Mar 2023 11:00) (97% - 100%)    Parameters below as of 13 Mar 2023 11:00  Patient On (Oxygen Delivery Method): room air        PHYSICAL EXAM:  General: [x ] non-toxic  HEAD/EYES: [ ] PERRL [ x] white sclera [ ] icterus  ENT:  [ ] normal [x ] supple [ ] thrush [ ] pharyngeal exudate  Cardiovascular:   [ ] murmur [ x] normal [ ] PPM/AICD  Respiratory:  [ x] clear to ausculation bilaterally  GI:  [ ] soft, non-tender, normal bowel sounds  :  [ ] diaz [ ] no CVA tenderness   Musculoskeletal:  [ ] no synovitis  Neurologic:  [ ] non-focal exam   Skin:  [ x] right middle finger with soft tissue swelling- chronic, no warmth, no erythema  Lymph: [x ] no lymphadenopathy  Psychiatric:  [x ] appropriate affect [ ] alert & oriented  Lines:  [x ] no phlebitis [ ] central line                                10.7   19.64 )-----------( 355      ( 13 Mar 2023 09:03 )             33.9       03-13    141  |  107  |  37<H>  ----------------------------<  151<H>  4.9   |  24  |  1.56<H>    Ca    7.2<L>      13 Mar 2023 09:03  Phos  1.1       Mg     1.7     -        Urinalysis Basic - ( 12 Mar 2023 17:13 )    Color: Yellow / Appearance: Clear / S.029 / pH: x  Gluc: x / Ketone: Negative  / Bili: Negative / Urobili: Negative   Blood: x / Protein: 30 mg/dL / Nitrite: Negative   Leuk Esterase: Negative / RBC: 2 /hpf / WBC 2 /HPF   Sq Epi: x / Non Sq Epi: 1 /hpf / Bacteria: Negative        MICROBIOLOGY:Culture Results:   Normal Respiratory Heaven present (23 @ 17:54)  Culture Results:   No Growth Final (23 @ 08:00)  Culture Results:   No Growth Final (23 @ 07:45)  Culture Results:   Normal Respiratory Heaven present (23 @ 17:45)  Culture Results:   Culture is being performed. (23 @ 11:29)  Culture Results:   Culture is being performed. Fungal cultures are held for 4 weeks. (23 @ 11:29)      RADIOLOGY:

## 2023-03-13 NOTE — PROGRESS NOTE ADULT - PROBLEM SELECTOR PLAN 5
Prednisone-induced hyperglycemia w/ blood glucose elevated to 400-500 on admission. Based on outpatient records, patient has been hyperglycemic in the past, last A1c 4.7; not currently on any JAVIER or insulin. Not given any insulin in ED.   - BHB wnl, anion gap wnl  - given 5u regular insulin;  at the time  - increase standing insulin based on 24h requirements  - started ISS, hypoglycemia protocol  - endocrine consulted for hyperglycemia  - on lantus 22 units and admelog 18-4-4 Prednisone-induced hyperglycemia w/ blood glucose elevated to 400-500 on admission. Based on outpatient records, patient has been hyperglycemic in the past, last A1c 4.7; not previously on insulin  - endocrine consulted for hyperglycemia  - on lantus 22 units and admelog 18-4-4  - has been set up for home nursing with emphasis on insulin teaching

## 2023-03-13 NOTE — PROGRESS NOTE ADULT - PROBLEM SELECTOR PLAN 1
- s/p OHT in 2/23/18  - continue home Toprol  mg PO QD; losartan on hold d/t renal dysfunction   - continue home ASA 81 and  atorvastatin 10 mg PO QD  - continue home pantoprazole 40 mg PO QD    - was planned for repeat EMBx but unlikely rejection so defer  - d/c diltiazem as tachycardia is common post-transplant.

## 2023-03-13 NOTE — PROGRESS NOTE ADULT - PROBLEM SELECTOR PLAN 1
P/w chest tightness x1d associated w/ dry cough; likely multifactorial in s/o atrial flutter w/ HR 140s, notes that his chest tightness is improving as HR is lowered. Also considering contribution from CT chest findings of impacted R middle and lower lobe bronchi w/ near collapse of R middle/lower lobes.   - RVP positive for human metapneumovirus   - Procal elevated to 2.39; neutrophilic predominance on diff with 1% bands in immunosuppresed   - continue abx: cefepime 1g q24h (3/4 - 2/8 ) --> adjusted for NORMAN  -EBV negative for active infection, MRSA negative  - fungitell > 300   -BCx positive for likley contaminant; f/u repeat   - treatment of aflutter as below    -Pulm consulted for possible bronch; f/u recs (aggressive Airway clearance since patient not hypoxic)  - Transplant ID following; f/u recs  - c/w ppx of bactrim and valcyclovir (redosed given improved Cr Cl) P/w chest tightness x1d associated w/ dry cough; likely multifactorial in s/o atrial flutter w/ HR 140s, notes that his chest tightness is improving as HR is lowered. Also considering contribution from CT chest findings of impacted R middle and lower lobe bronchi w/ near collapse of R middle/lower lobes.   - RVP positive for human metapneumovirus   - Procal elevated to 2.39; neutrophilic predominance on diff with 1% bands in immunosuppresed   - continue abx: cefepime 1g q24h (3/4 - 2/8 ) --> adjusted for NORMAN  -EBV negative for active infection, MRSA negative  - fungitell > 300   -BCx positive for likley contaminant; f/u repeat   - treatment of aflutter as below    -Pulm consulted for possible bronch; no indication for bronch right now  - Transplant ID following; f/u recs  - c/w ppx of bactrim and valcyclovir (redosed given improved Cr Cl)

## 2023-03-13 NOTE — PROGRESS NOTE ADULT - ASSESSMENT
71 y/o male with hx of nonischemic cardiomyopathy s/p HM2 LVAD, underwent OHT 2/23/18 with hepatitis C donor, hx of hemolytic anemia presented w/ 1d chest tightness, found to have new onset aflutter w/ RVR, Patient found to have human metapneumovirus with likely superimposed bacterial infection given elevated procal and CT findings of RLL and RML collapse. Course c/b elevated fungitell however stable on room air.  71 y/o male with hx of nonischemic cardiomyopathy s/p HM2 LVAD, underwent OHT 2/23/18 with hepatitis C donor, hx of hemolytic anemia presented w/ 1d chest tightness, found to have new onset aflutter w/ RVR, Patient found to have human metapneumovirus with likely superimposed bacterial infection given elevated procal and CT findings of RLL and RML collapse. Course c/b elevated fungitell and persistently high white count 71 y/o male with hx of nonischemic cardiomyopathy s/p HM2 LVAD, underwent OHT 2/23/18 with hepatitis C donor, hx of hemolytic anemia presented w/ 1d chest tightness, found to have new onset aflutter w/ RVR, Patient found to have human metapneumovirus with likely superimposed bacterial infection given elevated procal and CT findings of RLL and RML collapse. Course c/b elevated fungitell and persistently high white count. Caregiver Tahira updated over the phone.

## 2023-03-13 NOTE — PROGRESS NOTE ADULT - ATTENDING COMMENTS
Patient seen and examined at bedside. He overall looks well. However his WBC has been uptrending since this weekend  - panculture and reengage transplant ID for further recs  - tac level has been subtherapeutic, did not obtain levels today, patient intermittently refuses labs. Please obtain tac level 30 min before AM dose  - history of hemolytic anemia with stable hemoglobin, on pred 60mg daily with appropriate ppx medications

## 2023-03-13 NOTE — PROGRESS NOTE ADULT - PROBLEM SELECTOR PLAN 2
- obtain tacro level 30min prior to administration in AM  - dose tac for goal 6-8  - s/u transplant ID, WBC rising   - pulm consult for bronch but not indicated per them  - restart home bactrim ppx  - con't valganciclovir ppx  - c/w antifungal.

## 2023-03-13 NOTE — PROGRESS NOTE ADULT - ATTENDING COMMENTS
above plans discussed with Dr. [place]    # hMPV PNA  # leukocytosis  # [facility]  # Aflutter with RVR  # AIHA on high dose steroids  # steroid induced hyperglycemia  # s/p heart transplant  # hx of C.diff colitis    - new onset leukocytosis over the weekend and now worsening; pt without any localizing symptoms  - appreciate ID recs: fu infectious workups, monitor off of abx  - prednisone dose reduced to 60mg daily  - FS now better controlled on current regimen  - tacro level at goal   - appreciate transplant cards: no indication for EMBx at this time  - pt is currently stable on RA, improvement in symptoms - hold off on pulm consult for bronchoscopy at this time  - appreciate interdisciplinary team recommendations from transplant cards, ID, ethan Trevizo MD  Division of Hospital Medicine  Contact via [employer] Teams  Office: [phone]

## 2023-03-13 NOTE — PROGRESS NOTE ADULT - PROBLEM SELECTOR PLAN 4
- on last admission it was stated by heme to avoid blood transfusion unless he is significantly symptomatic due to risk of hyperhemolysis. Of note if a transfusion is needed it is ok from heart transplant perspective.  - appreciate heme/onc consult for hemolytic anemia management and prednisone dosing given concern for oppurtunistic infection  - currently on pred 60 mg daily   - needs insulin teaching.

## 2023-03-13 NOTE — PROGRESS NOTE ADULT - SUBJECTIVE AND OBJECTIVE BOX
Subjective:  No acute events. Was not conversive this AM, appeared upset but would not state why.     Medications:  acetaminophen     Tablet .. 650 milliGRAM(s) Oral every 6 hours PRN  albuterol/ipratropium for Nebulization 3 milliLiter(s) Nebulizer every 6 hours  apixaban 5 milliGRAM(s) Oral every 12 hours  aspirin enteric coated 81 milliGRAM(s) Oral daily  atorvastatin 10 milliGRAM(s) Oral at bedtime  dextrose 5%. 1000 milliLiter(s) IV Continuous <Continuous>  dextrose 5%. 1000 milliLiter(s) IV Continuous <Continuous>  dextrose 50% Injectable 25 Gram(s) IV Push once  dextrose 50% Injectable 12.5 Gram(s) IV Push once  dextrose 50% Injectable 25 Gram(s) IV Push once  dextrose Oral Gel 15 Gram(s) Oral once  folic acid 1 milliGRAM(s) Oral daily  glucagon  Injectable 1 milliGRAM(s) IntraMuscular once  guaiFENesin Oral Liquid (Sugar-Free) 200 milliGRAM(s) Oral every 6 hours  insulin glargine Injectable (LANTUS) 22 Unit(s) SubCutaneous at bedtime  insulin lispro (ADMELOG) corrective regimen sliding scale   SubCutaneous three times a day before meals  insulin lispro (ADMELOG) corrective regimen sliding scale   SubCutaneous <User Schedule>  insulin lispro Injectable (ADMELOG) 18 Unit(s) SubCutaneous before breakfast  insulin lispro Injectable (ADMELOG) 6 Unit(s) SubCutaneous before dinner  insulin lispro Injectable (ADMELOG) 6 Unit(s) SubCutaneous before lunch  melatonin 3 milliGRAM(s) Oral at bedtime PRN  metoprolol succinate  milliGRAM(s) Oral daily  nystatin    Suspension 139618 Unit(s) Oral <User Schedule>  pantoprazole    Tablet 40 milliGRAM(s) Oral before breakfast  polyethylene glycol 3350 17 Gram(s) Oral daily  potassium phosphate / sodium phosphate Powder (PHOS-NaK) 1 Packet(s) Oral every 2 hours  predniSONE   Tablet 60 milliGRAM(s) Oral daily  senna 1 Tablet(s) Oral daily  simethicone 80 milliGRAM(s) Chew daily  sodium chloride 3%  Inhalation 4 milliLiter(s) Inhalation every 12 hours  tacrolimus 1.5 milliGRAM(s) Oral <User Schedule>  trimethoprim   80 mG/sulfamethoxazole 400 mG 1 Tablet(s) Oral every 24 hours  valGANciclovir 450 milliGRAM(s) Oral daily    Vitals:  T(C): 36.8 (03-13-23 @ 11:00), Max: 36.8 (03-12-23 @ 16:39)  HR: 95 (03-13-23 @ 11:00) (95 - 108)  BP: 126/87 (03-13-23 @ 11:00) (126/87 - 149/97)  BP(mean): 100 (03-13-23 @ 11:00) (100 - 100)  RR: 18 (03-13-23 @ 11:00) (18 - 18)  SpO2: 100% (03-13-23 @ 11:00) (97% - 100%)      I&O's Summary    12 Mar 2023 07:01  -  13 Mar 2023 07:00  --------------------------------------------------------  IN: 700 mL / OUT: 400 mL / NET: 300 mL        Physical Exam:  Appearance: No Acute Distress  Cardiovascular: RRR, Normal S1 S2, No murmurs/rubs/gallops  Respiratory: Clear to auscultation bilaterally  Gastrointestinal: Soft, Non-tender, non-distended	  Skin: no skin lesions  Neurologic: Non-focal  Extremities: No LE edema, warm and well perfused  Psychiatry: A & O x 3, Mood & affect appropriate        Labs:                        10.7   19.64 )-----------( 355      ( 13 Mar 2023 09:03 )             33.9     03-13    141  |  107  |  37<H>  ----------------------------<  151<H>  4.9   |  24  |  1.56<H>    Ca    7.2<L>      13 Mar 2023 09:03  Phos  1.1     03-13  Mg     1.7     03-13          Tacrolimus (), Serum: 2.4 ng/mL (03-12-23 @ 11:55)  Tacrolimus (), Serum: 2.6 ng/mL (03-11-23 @ 10:55)  Tacrolimus (), Serum: 3.0 ng/mL (03-10-23 @ 09:08)  Tacrolimus (), Serum: 3.4 ng/mL (03-09-23 @ 08:24)  Tacrolimus (), Serum: 5.3 ng/mL (03-08-23 @ 08:41)  Tacrolimus (), Serum: 8.2 ng/mL (03-07-23 @ 06:52)

## 2023-03-14 LAB
ALBUMIN SERPL ELPH-MCNC: 3.5 G/DL — SIGNIFICANT CHANGE UP (ref 3.3–5)
ALP SERPL-CCNC: 69 U/L — SIGNIFICANT CHANGE UP (ref 40–120)
ALT FLD-CCNC: 29 U/L — SIGNIFICANT CHANGE UP (ref 10–45)
ANION GAP SERPL CALC-SCNC: 12 MMOL/L — SIGNIFICANT CHANGE UP (ref 5–17)
AST SERPL-CCNC: 33 U/L — SIGNIFICANT CHANGE UP (ref 10–40)
BILIRUB DIRECT SERPL-MCNC: 0.2 MG/DL — SIGNIFICANT CHANGE UP (ref 0–0.3)
BILIRUB INDIRECT FLD-MCNC: 0.5 MG/DL — SIGNIFICANT CHANGE UP (ref 0.2–1)
BILIRUB SERPL-MCNC: 0.7 MG/DL — SIGNIFICANT CHANGE UP (ref 0.2–1.2)
BUN SERPL-MCNC: 38 MG/DL — HIGH (ref 7–23)
CALCIUM SERPL-MCNC: 7.1 MG/DL — LOW (ref 8.4–10.5)
CHLORIDE SERPL-SCNC: 108 MMOL/L — SIGNIFICANT CHANGE UP (ref 96–108)
CO2 SERPL-SCNC: 21 MMOL/L — LOW (ref 22–31)
CREAT SERPL-MCNC: 1.55 MG/DL — HIGH (ref 0.5–1.3)
CULTURE RESULTS: SIGNIFICANT CHANGE UP
EGFR: 47 ML/MIN/1.73M2 — LOW
GLUCOSE BLDC GLUCOMTR-MCNC: 159 MG/DL — HIGH (ref 70–99)
GLUCOSE BLDC GLUCOMTR-MCNC: 176 MG/DL — HIGH (ref 70–99)
GLUCOSE BLDC GLUCOMTR-MCNC: 176 MG/DL — HIGH (ref 70–99)
GLUCOSE BLDC GLUCOMTR-MCNC: 263 MG/DL — HIGH (ref 70–99)
GLUCOSE BLDC GLUCOMTR-MCNC: 352 MG/DL — HIGH (ref 70–99)
GLUCOSE SERPL-MCNC: 241 MG/DL — HIGH (ref 70–99)
HCT VFR BLD CALC: 36.7 % — LOW (ref 39–50)
HGB BLD-MCNC: 11.1 G/DL — LOW (ref 13–17)
MAGNESIUM SERPL-MCNC: 1.5 MG/DL — LOW (ref 1.6–2.6)
MCHC RBC-ENTMCNC: 30.2 GM/DL — LOW (ref 32–36)
MCHC RBC-ENTMCNC: 32.5 PG — SIGNIFICANT CHANGE UP (ref 27–34)
MCV RBC AUTO: 107.3 FL — HIGH (ref 80–100)
NRBC # BLD: 0 /100 WBCS — SIGNIFICANT CHANGE UP (ref 0–0)
PHOSPHATE SERPL-MCNC: 1.7 MG/DL — LOW (ref 2.5–4.5)
PLATELET # BLD AUTO: 364 K/UL — SIGNIFICANT CHANGE UP (ref 150–400)
POTASSIUM SERPL-MCNC: 4.9 MMOL/L — SIGNIFICANT CHANGE UP (ref 3.5–5.3)
POTASSIUM SERPL-SCNC: 4.9 MMOL/L — SIGNIFICANT CHANGE UP (ref 3.5–5.3)
PROT SERPL-MCNC: 5.8 G/DL — LOW (ref 6–8.3)
RBC # BLD: 3.42 M/UL — LOW (ref 4.2–5.8)
RBC # FLD: 17 % — HIGH (ref 10.3–14.5)
SODIUM SERPL-SCNC: 141 MMOL/L — SIGNIFICANT CHANGE UP (ref 135–145)
SPECIMEN SOURCE: SIGNIFICANT CHANGE UP
TACROLIMUS SERPL-MCNC: 9.9 NG/ML — SIGNIFICANT CHANGE UP
WBC # BLD: 20.26 K/UL — HIGH (ref 3.8–10.5)
WBC # FLD AUTO: 20.26 K/UL — HIGH (ref 3.8–10.5)

## 2023-03-14 PROCEDURE — 99232 SBSQ HOSP IP/OBS MODERATE 35: CPT

## 2023-03-14 PROCEDURE — 74176 CT ABD & PELVIS W/O CONTRAST: CPT | Mod: 26

## 2023-03-14 PROCEDURE — 99233 SBSQ HOSP IP/OBS HIGH 50: CPT | Mod: GC

## 2023-03-14 PROCEDURE — 99233 SBSQ HOSP IP/OBS HIGH 50: CPT

## 2023-03-14 PROCEDURE — 71250 CT THORAX DX C-: CPT | Mod: 26

## 2023-03-14 RX ORDER — SODIUM,POTASSIUM PHOSPHATES 278-250MG
1 POWDER IN PACKET (EA) ORAL
Refills: 0 | Status: COMPLETED | OUTPATIENT
Start: 2023-03-14 | End: 2023-03-14

## 2023-03-14 RX ORDER — MAGNESIUM OXIDE 400 MG ORAL TABLET 241.3 MG
400 TABLET ORAL ONCE
Refills: 0 | Status: COMPLETED | OUTPATIENT
Start: 2023-03-14 | End: 2023-03-14

## 2023-03-14 RX ORDER — INSULIN LISPRO 100/ML
20 VIAL (ML) SUBCUTANEOUS
Refills: 0 | Status: DISCONTINUED | OUTPATIENT
Start: 2023-03-15 | End: 2023-03-15

## 2023-03-14 RX ORDER — INSULIN LISPRO 100/ML
10 VIAL (ML) SUBCUTANEOUS
Refills: 0 | Status: DISCONTINUED | OUTPATIENT
Start: 2023-03-15 | End: 2023-03-18

## 2023-03-14 RX ORDER — INSULIN LISPRO 100/ML
8 VIAL (ML) SUBCUTANEOUS
Refills: 0 | Status: DISCONTINUED | OUTPATIENT
Start: 2023-03-15 | End: 2023-03-17

## 2023-03-14 RX ADMIN — Medication 6 UNIT(S): at 13:47

## 2023-03-14 RX ADMIN — TACROLIMUS 1.5 MILLIGRAM(S): 5 CAPSULE ORAL at 10:03

## 2023-03-14 RX ADMIN — Medication 500000 UNIT(S): at 09:08

## 2023-03-14 RX ADMIN — PANTOPRAZOLE SODIUM 40 MILLIGRAM(S): 20 TABLET, DELAYED RELEASE ORAL at 05:03

## 2023-03-14 RX ADMIN — Medication 200 MILLIGRAM(S): at 05:03

## 2023-03-14 RX ADMIN — Medication 18 UNIT(S): at 09:07

## 2023-03-14 RX ADMIN — Medication 1 PACKET(S): at 14:13

## 2023-03-14 RX ADMIN — Medication 500000 UNIT(S): at 20:50

## 2023-03-14 RX ADMIN — Medication 200 MILLIGRAM(S): at 18:36

## 2023-03-14 RX ADMIN — Medication 10: at 14:11

## 2023-03-14 RX ADMIN — Medication 200 MILLIGRAM(S): at 05:02

## 2023-03-14 RX ADMIN — APIXABAN 5 MILLIGRAM(S): 2.5 TABLET, FILM COATED ORAL at 05:02

## 2023-03-14 RX ADMIN — APIXABAN 5 MILLIGRAM(S): 2.5 TABLET, FILM COATED ORAL at 16:13

## 2023-03-14 RX ADMIN — Medication 2: at 09:07

## 2023-03-14 RX ADMIN — Medication 200 MILLIGRAM(S): at 10:11

## 2023-03-14 RX ADMIN — Medication 1 MILLIGRAM(S): at 10:06

## 2023-03-14 RX ADMIN — Medication 81 MILLIGRAM(S): at 10:06

## 2023-03-14 RX ADMIN — ATORVASTATIN CALCIUM 10 MILLIGRAM(S): 80 TABLET, FILM COATED ORAL at 20:50

## 2023-03-14 RX ADMIN — VALGANCICLOVIR 450 MILLIGRAM(S): 450 TABLET, FILM COATED ORAL at 11:23

## 2023-03-14 RX ADMIN — Medication 500000 UNIT(S): at 17:12

## 2023-03-14 RX ADMIN — Medication 1 PACKET(S): at 16:15

## 2023-03-14 RX ADMIN — Medication 6: at 17:48

## 2023-03-14 RX ADMIN — Medication 650 MILLIGRAM(S): at 21:53

## 2023-03-14 RX ADMIN — Medication 1 PACKET(S): at 12:55

## 2023-03-14 RX ADMIN — SIMETHICONE 80 MILLIGRAM(S): 80 TABLET, CHEWABLE ORAL at 10:09

## 2023-03-14 RX ADMIN — Medication 6 UNIT(S): at 17:48

## 2023-03-14 RX ADMIN — Medication 500000 UNIT(S): at 12:00

## 2023-03-14 RX ADMIN — Medication 60 MILLIGRAM(S): at 05:02

## 2023-03-14 RX ADMIN — MAGNESIUM OXIDE 400 MG ORAL TABLET 400 MILLIGRAM(S): 241.3 TABLET ORAL at 12:54

## 2023-03-14 RX ADMIN — TACROLIMUS 1.5 MILLIGRAM(S): 5 CAPSULE ORAL at 20:50

## 2023-03-14 RX ADMIN — Medication 1 TABLET(S): at 10:09

## 2023-03-14 RX ADMIN — INSULIN GLARGINE 22 UNIT(S): 100 INJECTION, SOLUTION SUBCUTANEOUS at 21:29

## 2023-03-14 NOTE — PROGRESS NOTE ADULT - SUBJECTIVE AND OBJECTIVE BOX
Patient seen and examined at bedside.    Overnight Events:   No events. Denies CP, palpitations, SOB.     REVIEW OF SYSTEMS:  Constitutional:     [x ] negative [ ] fevers [ ] chills [ ] weight loss [ ] weight gain  HEENT:                  [x ] negative [ ] dry eyes [ ] eye irritation [ ] postnasal drip [ ] nasal congestion  CV:                         [ x] negative  [ ] chest pain [ ] orthopnea [ ] palpitations [ ] murmur  Resp:                     [x ] negative [ ] cough [ ] shortness of breath [ ] dyspnea [ ] wheezing [ ] sputum [ ]hemoptysis  GI:                          [x ] negative [ ] nausea [ ] vomiting [ ] diarrhea [ ] constipation [ ] abd pain [ ] dysphagia   :                        [ x] negative [ ] dysuria [ ] nocturia [ ] hematuria [ ] increased urinary frequency  Musculoskeletal: [x ] negative [ ] back pain [ ] myalgias [ ] arthralgias [ ] fracture  Skin:                       [ x] negative [ ] rash [ ] itch  Neurological:        [ x] negative [ ] headache [ ] dizziness [ ] syncope [ ] weakness [ ] numbness  Psychiatric:           [ x] negative [ ] anxiety [ ] depression  Endocrine:            [ x] negative [ ] diabetes [ ] thyroid problem  Heme/Lymph:      [ x] negative [ ] anemia [ ] bleeding problem  Allergic/Immune: [ x] negative [ ] itchy eyes [ ] nasal discharge [ ] hives [ ] angioedema    [ x] All other systems negative          Current Meds:  acetaminophen     Tablet .. 650 milliGRAM(s) Oral every 6 hours PRN  albuterol/ipratropium for Nebulization 3 milliLiter(s) Nebulizer every 6 hours  apixaban 5 milliGRAM(s) Oral every 12 hours  aspirin enteric coated 81 milliGRAM(s) Oral daily  atorvastatin 10 milliGRAM(s) Oral at bedtime  dextrose 5%. 1000 milliLiter(s) IV Continuous <Continuous>  dextrose 5%. 1000 milliLiter(s) IV Continuous <Continuous>  dextrose 50% Injectable 25 Gram(s) IV Push once  dextrose 50% Injectable 12.5 Gram(s) IV Push once  dextrose 50% Injectable 25 Gram(s) IV Push once  dextrose Oral Gel 15 Gram(s) Oral once  folic acid 1 milliGRAM(s) Oral daily  glucagon  Injectable 1 milliGRAM(s) IntraMuscular once  guaiFENesin Oral Liquid (Sugar-Free) 200 milliGRAM(s) Oral every 6 hours  insulin glargine Injectable (LANTUS) 22 Unit(s) SubCutaneous at bedtime  insulin lispro (ADMELOG) corrective regimen sliding scale   SubCutaneous <User Schedule>  insulin lispro (ADMELOG) corrective regimen sliding scale   SubCutaneous three times a day before meals  insulin lispro Injectable (ADMELOG) 18 Unit(s) SubCutaneous before breakfast  insulin lispro Injectable (ADMELOG) 6 Unit(s) SubCutaneous before dinner  insulin lispro Injectable (ADMELOG) 6 Unit(s) SubCutaneous before lunch  melatonin 3 milliGRAM(s) Oral at bedtime PRN  metoprolol succinate  milliGRAM(s) Oral daily  nystatin    Suspension 929279 Unit(s) Oral <User Schedule>  pantoprazole    Tablet 40 milliGRAM(s) Oral before breakfast  polyethylene glycol 3350 17 Gram(s) Oral daily  potassium phosphate / sodium phosphate Powder (PHOS-NaK) 1 Packet(s) Oral every 2 hours  predniSONE   Tablet 60 milliGRAM(s) Oral daily  senna 1 Tablet(s) Oral daily  simethicone 80 milliGRAM(s) Chew daily  sodium chloride 3%  Inhalation 4 milliLiter(s) Inhalation every 12 hours  tacrolimus 1.5 milliGRAM(s) Oral <User Schedule>  trimethoprim   80 mG/sulfamethoxazole 400 mG 1 Tablet(s) Oral every 24 hours  valGANciclovir 450 milliGRAM(s) Oral daily      Vitals:  T(F): 97.9 (03-14), Max: 97.9 (03-14)  HR: 96 (03-14) (96 - 103)  BP: 120/84 (03-14) (120/84 - 135/94)  RR: 18 (03-14)  SpO2: 99% (03-14)  I&O's Summary    13 Mar 2023 07:01  -  14 Mar 2023 07:00  --------------------------------------------------------  IN: 0 mL / OUT: 200 mL / NET: -200 mL      PHYSICAL EXAM:  Appearance: No Acute Distress  Cardiovascular: RRR, Normal S1 S2, No murmurs/rubs/gallops  Respiratory: Clear to auscultation bilaterally  Gastrointestinal: Soft, Non-tender, non-distended	  Skin: no skin lesions  Neurologic: Non-focal  Extremities: No LE edema, warm and well perfused  Psychiatry: A & O x 3, Mood & affect appropriate                          11.1   20.26 )-----------( 364      ( 14 Mar 2023 11:38 )             36.7     03-14    141  |  108  |  38<H>  ----------------------------<  241<H>  4.9   |  21<L>  |  1.55<H>    Ca    7.1<L>      14 Mar 2023 11:38  Phos  1.7     03-14  Mg     1.5     03-14      Tacrolimus (), Serum: 2.4 ng/mL (03-12-23 @ 11:55)  Tacrolimus (), Serum: 2.6 ng/mL (03-11-23 @ 10:55)  Tacrolimus (), Serum: 3.0 ng/mL (03-10-23 @ 09:08)  Tacrolimus (), Serum: 3.4 ng/mL (03-09-23 @ 08:24)  Tacrolimus (), Serum: 5.3 ng/mL (03-08-23 @ 08:41)  Tacrolimus (), Serum: 8.2 ng/mL (03-07-23 @ 06:52)        DIAGNOSTIC/IMAGING:

## 2023-03-14 NOTE — PROGRESS NOTE ADULT - ATTENDING COMMENTS
Patient seen and examined at bedside. He overall looks well. However his WBC has been uptrending since this weekend  - panculture, results pending.  - appreciate transplant ID recs  - Will adjust tac dose, based on the level  - history of hemolytic anemia with stable hemoglobin, on pred 60mg daily with appropriate ppx medications

## 2023-03-14 NOTE — PROGRESS NOTE ADULT - SUBJECTIVE AND OBJECTIVE BOX
INTERVAL HPI/OVERNIGHT EVENTS:    SUBJECTIVE: Patient seen and examined at bedside.    OBJECTIVE:    VITAL SIGNS:  ICU Vital Signs Last 24 Hrs  T(C): 36.3 (14 Mar 2023 04:40), Max: 36.8 (13 Mar 2023 11:00)  T(F): 97.4 (14 Mar 2023 04:40), Max: 98.2 (13 Mar 2023 11:00)  HR: 96 (14 Mar 2023 04:40) (95 - 103)  BP: 135/94 (14 Mar 2023 04:40) (126/87 - 135/94)  BP(mean): 100 (13 Mar 2023 11:00) (100 - 100)  ABP: --  ABP(mean): --  RR: 18 (14 Mar 2023 04:40) (18 - 18)  SpO2: 96% (14 Mar 2023 04:40) (96% - 100%)    O2 Parameters below as of 14 Mar 2023 04:40  Patient On (Oxygen Delivery Method): room air              03-13 @ 07:01  -  03-14 @ 07:00  --------------------------------------------------------  IN: 0 mL / OUT: 200 mL / NET: -200 mL      CAPILLARY BLOOD GLUCOSE      POCT Blood Glucose.: 159 mg/dL (14 Mar 2023 01:09)      PHYSICAL EXAM:    General: NAD  HEENT: NC/AT; PERRL, clear conjunctiva  Neck: supple  Respiratory: CTA b/l  Cardiovascular: +S1/S2; RRR  Abdomen: soft, NT/ND; +BS x4  Extremities:  no LE edema  Vascular: WWP, 2+ peripheral pulses b/l;  Skin: normal color and turgor; no rash  Neurological: A&Ox3, move all extremities. CN II-XII intact    MEDICATIONS:  MEDICATIONS  (STANDING):  albuterol/ipratropium for Nebulization 3 milliLiter(s) Nebulizer every 6 hours  apixaban 5 milliGRAM(s) Oral every 12 hours  aspirin enteric coated 81 milliGRAM(s) Oral daily  atorvastatin 10 milliGRAM(s) Oral at bedtime  dextrose 5%. 1000 milliLiter(s) (50 mL/Hr) IV Continuous <Continuous>  dextrose 5%. 1000 milliLiter(s) (100 mL/Hr) IV Continuous <Continuous>  dextrose 50% Injectable 25 Gram(s) IV Push once  dextrose 50% Injectable 12.5 Gram(s) IV Push once  dextrose 50% Injectable 25 Gram(s) IV Push once  dextrose Oral Gel 15 Gram(s) Oral once  folic acid 1 milliGRAM(s) Oral daily  glucagon  Injectable 1 milliGRAM(s) IntraMuscular once  guaiFENesin Oral Liquid (Sugar-Free) 200 milliGRAM(s) Oral every 6 hours  insulin glargine Injectable (LANTUS) 22 Unit(s) SubCutaneous at bedtime  insulin lispro (ADMELOG) corrective regimen sliding scale   SubCutaneous <User Schedule>  insulin lispro (ADMELOG) corrective regimen sliding scale   SubCutaneous three times a day before meals  insulin lispro Injectable (ADMELOG) 18 Unit(s) SubCutaneous before breakfast  insulin lispro Injectable (ADMELOG) 6 Unit(s) SubCutaneous before dinner  insulin lispro Injectable (ADMELOG) 6 Unit(s) SubCutaneous before lunch  metoprolol succinate  milliGRAM(s) Oral daily  nystatin    Suspension 644745 Unit(s) Oral <User Schedule>  pantoprazole    Tablet 40 milliGRAM(s) Oral before breakfast  polyethylene glycol 3350 17 Gram(s) Oral daily  predniSONE   Tablet 60 milliGRAM(s) Oral daily  senna 1 Tablet(s) Oral daily  simethicone 80 milliGRAM(s) Chew daily  sodium chloride 3%  Inhalation 4 milliLiter(s) Inhalation every 12 hours  tacrolimus 1.5 milliGRAM(s) Oral <User Schedule>  trimethoprim   80 mG/sulfamethoxazole 400 mG 1 Tablet(s) Oral every 24 hours  valGANciclovir 450 milliGRAM(s) Oral daily    MEDICATIONS  (PRN):  acetaminophen     Tablet .. 650 milliGRAM(s) Oral every 6 hours PRN Temp greater or equal to 38C (100.4F), Mild Pain (1 - 3)  melatonin 3 milliGRAM(s) Oral at bedtime PRN Insomnia      ALLERGIES:  Allergies    No Known Allergies    Intolerances        LABS:                        10.7   19.64 )-----------( 355      ( 13 Mar 2023 09:03 )             33.9     03-13    141  |  107  |  37<H>  ----------------------------<  151<H>  4.9   |  24  |  1.56<H>    Ca    7.2<L>      13 Mar 2023 09:03  Phos  1.1       Mg     1.7             Urinalysis Basic - ( 12 Mar 2023 17:13 )    Color: Yellow / Appearance: Clear / S.029 / pH: x  Gluc: x / Ketone: Negative  / Bili: Negative / Urobili: Negative   Blood: x / Protein: 30 mg/dL / Nitrite: Negative   Leuk Esterase: Negative / RBC: 2 /hpf / WBC 2 /HPF   Sq Epi: x / Non Sq Epi: 1 /hpf / Bacteria: Negative        RADIOLOGY & ADDITIONAL TESTS: Reviewed. INTERVAL HPI/OVERNIGHT EVENTS: KEVIN OVN    SUBJECTIVE: Patient seen and examined at bedside. Patient refusing to participate in interview this AM     OBJECTIVE:    VITAL SIGNS:  ICU Vital Signs Last 24 Hrs  T(C): 36.3 (14 Mar 2023 04:40), Max: 36.8 (13 Mar 2023 11:00)  T(F): 97.4 (14 Mar 2023 04:40), Max: 98.2 (13 Mar 2023 11:00)  HR: 96 (14 Mar 2023 04:40) (95 - 103)  BP: 135/94 (14 Mar 2023 04:40) (126/87 - 135/94)  BP(mean): 100 (13 Mar 2023 11:00) (100 - 100)  ABP: --  ABP(mean): --  RR: 18 (14 Mar 2023 04:40) (18 - 18)  SpO2: 96% (14 Mar 2023 04:40) (96% - 100%)    O2 Parameters below as of 14 Mar 2023 04:40  Patient On (Oxygen Delivery Method): room air              03-13 @ 07:01  -  03-14 @ 07:00  --------------------------------------------------------  IN: 0 mL / OUT: 200 mL / NET: -200 mL      CAPILLARY BLOOD GLUCOSE      POCT Blood Glucose.: 159 mg/dL (14 Mar 2023 01:09)      PHYSICAL EXAM: Patient refusing physical exam this AM       MEDICATIONS:  MEDICATIONS  (STANDING):  albuterol/ipratropium for Nebulization 3 milliLiter(s) Nebulizer every 6 hours  apixaban 5 milliGRAM(s) Oral every 12 hours  aspirin enteric coated 81 milliGRAM(s) Oral daily  atorvastatin 10 milliGRAM(s) Oral at bedtime  dextrose 5%. 1000 milliLiter(s) (50 mL/Hr) IV Continuous <Continuous>  dextrose 5%. 1000 milliLiter(s) (100 mL/Hr) IV Continuous <Continuous>  dextrose 50% Injectable 25 Gram(s) IV Push once  dextrose 50% Injectable 12.5 Gram(s) IV Push once  dextrose 50% Injectable 25 Gram(s) IV Push once  dextrose Oral Gel 15 Gram(s) Oral once  folic acid 1 milliGRAM(s) Oral daily  glucagon  Injectable 1 milliGRAM(s) IntraMuscular once  guaiFENesin Oral Liquid (Sugar-Free) 200 milliGRAM(s) Oral every 6 hours  insulin glargine Injectable (LANTUS) 22 Unit(s) SubCutaneous at bedtime  insulin lispro (ADMELOG) corrective regimen sliding scale   SubCutaneous <User Schedule>  insulin lispro (ADMELOG) corrective regimen sliding scale   SubCutaneous three times a day before meals  insulin lispro Injectable (ADMELOG) 18 Unit(s) SubCutaneous before breakfast  insulin lispro Injectable (ADMELOG) 6 Unit(s) SubCutaneous before dinner  insulin lispro Injectable (ADMELOG) 6 Unit(s) SubCutaneous before lunch  metoprolol succinate  milliGRAM(s) Oral daily  nystatin    Suspension [id number] Unit(s) Oral <User Schedule>  pantoprazole    Tablet 40 milliGRAM(s) Oral before breakfast  polyethylene glycol 3350 17 Gram(s) Oral daily  predniSONE   Tablet 60 milliGRAM(s) Oral daily  senna 1 Tablet(s) Oral daily  simethicone 80 milliGRAM(s) Chew daily  sodium chloride 3%  Inhalation 4 milliLiter(s) Inhalation every 12 hours  tacrolimus 1.5 milliGRAM(s) Oral <User Schedule>  trimethoprim   80 mG/sulfamethoxazole 400 mG 1 Tablet(s) Oral every 24 hours  valGANciclovir 450 milliGRAM(s) Oral daily    MEDICATIONS  (PRN):  acetaminophen     Tablet .. 650 milliGRAM(s) Oral every 6 hours PRN Temp greater or equal to 38C (100.4F), Mild Pain (1 - 3)  melatonin 3 milliGRAM(s) Oral at bedtime PRN Insomnia      ALLERGIES:  Allergies    No Known Allergies    Intolerances        LABS:                        10.7   19.64 )-----------( 355      ( 13 Mar 2023 09:03 )             33.9     03-13    141  |  107  |  37<H>  ----------------------------<  151<H>  4.9   |  24  |  1.56<H>    Ca    7.2<L>      13 Mar 2023 09:03  Phos  1.1     03-13  Mg     1.7     03-13        Urinalysis Basic - ( 12 Mar 2023 17:13 )    Color: Yellow / Appearance: Clear / S.029 / pH: x  Gluc: x / Ketone: Negative  / Bili: Negative / Urobili: Negative   Blood: x / Protein: 30 mg/dL / Nitrite: Negative   Leuk Esterase: Negative / RBC: 2 /hpf / WBC 2 /HPF   Sq Epi: x / Non Sq Epi: 1 /hpf / Bacteria: Negative        RADIOLOGY & ADDITIONAL TESTS: Reviewed.

## 2023-03-14 NOTE — PROGRESS NOTE ADULT - PROBLEM SELECTOR PLAN 1
-test BG AC/HS  -c/w Lantus 22 units QHS  -Adjust Admelog 20-10-8 w/meals FOR NOW. HOLD IF NOT EATING.   -c/w Admelog moderate correction scale AC and mod HS scale for now  -cons carb diet  -On prednisone 60mg PO daily. Notify endocrine team if this is changed.   - Insulin pen/glucometer review w/RN  Discharge plan:  -Will be determined according to BG/insulin needs/PO intake and steroid therapy at time of discharge.  Send RX for Lantus solostar Pen 22 units QHS and Admelog pen 20-10-8 w/meals  -Pt has new glucometer w/supplies at bedside-needs RN to review glucometer prior to dischargeneed optho   -Please teach and allow pt to do own finger sticks and insulin injections under RN supervision  Please teach use of insulin pen  Please document teach back  - Home care due to new DM diagnosis and likely need of insulin therapy  - Patient to call doctor with persistent high or low BG at home.   - Recommend routine outpatient ophthalmology, podiatry   - Will need endocrinology f/u if pt send home on insulin/high dose steroids Can follow at [facility]. [address] [place] [address] [address]. Phone .   March 28 11am w/NP and Aug 15th 10:45am w/Dr Clemens.  Needs optho f/u as out pt

## 2023-03-14 NOTE — PROGRESS NOTE ADULT - ASSESSMENT
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 He is now back in sinus tachycardia. Course c/b by URI w/ human metapneumovirus. Would assess for superimposed infection given immunosuppression. Afib/flutter would be concerning for rejection, but repeat echo here with normal LV function. Also noted to have acute on chronic kidney injury likely 2/2 hypovolemia.

## 2023-03-14 NOTE — PROGRESS NOTE ADULT - ASSESSMENT
71 year old M with a PMH of HTN, NICM c/b HFrEF s/p LVAD 2017, and then subsequently s/p heart transplant from HCV donor (treated) in 2018 with post-op course complicated by graft dysfunction, who initially presented to the ED with cough and chest tightness.     Cough  Afebrile, no leukocytosis  RVP + for hMPV   CT with impacted RML and RLL bronchi, and patchy consolidations in RUL and LLL  Initially on Vancomycin and Cefepime   Legionella Ur Ag negative, serum Cryptococcal Ag negative, EBV IgM negative  UA negative  Blood cultures from March 3rd with 1/4 bottles with Micrococcus Luteus   Fungitell 300  Procalcitonin 2.39, Cr 2->3 ([facility])  On Valcyte and Bactrim prophylaxis     OVERALL  hMPV pneumonia in heart transplant/immunocompromised recipient   R/o superimposed bacterial infection   Elevated Fungitell   1/4 BCx bottles with Micrococcus   NORMAN, s/p heart transplant 2018  H/o HCV from donor s/p treatment   H/o C Diff in 2020     Treated with broad spectrum abx from 3/3 to 3/8 (zosyn then cefepime)  Non toxic appearing    1) Leukocytosis  DDx is broad- steroids may be contributing  Repeat blood cultures  Monitor clinically  Consider eepat CT CAP  Check LFTS  Add differential to CBC    2) Abnormal chest imaging  Chest x ray with enlarging effusion   Prior chest CT with mucus plugging  Pulm follow up    Can observe off empiric antibiotics at this time    Case and plan d/w hospitalist

## 2023-03-14 NOTE — PROGRESS NOTE ADULT - PROBLEM SELECTOR PLAN 1
P/w chest tightness x1d associated w/ dry cough; likely multifactorial in s/o atrial flutter w/ HR 140s, notes that his chest tightness is improving as HR is lowered. Also considering contribution from CT chest findings of impacted R middle and lower lobe bronchi w/ near collapse of R middle/lower lobes.   - RVP positive for human metapneumovirus   - Procal elevated to 2.39; neutrophilic predominance on diff with 1% bands in immunosuppresed   - continue abx: cefepime 1g q24h (3/4 - 2/8 ) --> adjusted for NORMAN  -EBV negative for active infection, MRSA negative  - fungitell > 300   -BCx positive for likley contaminant; f/u repeat   - treatment of aflutter as below    -Pulm consulted for possible bronch; no indication for bronch right now  - Transplant ID following; f/u recs  - c/w ppx of bactrim and valcyclovir (redosed given improved Cr Cl) P/w chest tightness x1d associated w/ dry cough; likely multifactorial in s/o atrial flutter w/ HR 140s, notes that his chest tightness is improving as HR is lowered. Also considering contribution from CT chest findings of impacted R middle and lower lobe bronchi w/ near collapse of R middle/lower lobes.   - RVP positive for human metapneumovirus   - Procal elevated to 2.39; neutrophilic predominance on diff with 1% bands in immunosuppresed   - continue abx: cefepime 1g q24h (3/4 - 2/8 ) --> adjusted for NORMAN  -EBV negative for active infection, MRSA negative  - fungitell > 300   - repeat BCx NGTD  - treatment of aflutter as below    -f/u repeat CT Chest/A/P for possible infectious etiology in setting of leukocytosis  - Transplant ID following; f/u recs  - c/w ppx of bactrim and valcyclovir (redosed given improved Cr Cl)

## 2023-03-14 NOTE — PROVIDER CONTACT NOTE (OTHER) - SITUATION
patient requesting cookies all shift from different staff members . educated on diabetic diet . patient would not participate in  diabetic teaching. patient said "I KNOW WHAT TO DO'"
Pt sustaining

## 2023-03-14 NOTE — PROGRESS NOTE ADULT - SUBJECTIVE AND OBJECTIVE BOX
Follow Up:      Inverval History/ROS:Patient is a 72y old  Male who presents with a chief complaint of a flutter (14 Mar 2023 07:31)    No fever   No events      Allergies    No Known Allergies    Intolerances        ANTIMICROBIALS:  nystatin    Suspension 232474 <User Schedule>  trimethoprim   80 mG/sulfamethoxazole 400 mG 1 every 24 hours  valGANciclovir 450 daily      OTHER MEDS:  acetaminophen     Tablet .. 650 milliGRAM(s) Oral every 6 hours PRN  albuterol/ipratropium for Nebulization 3 milliLiter(s) Nebulizer every 6 hours  apixaban 5 milliGRAM(s) Oral every 12 hours  aspirin enteric coated 81 milliGRAM(s) Oral daily  atorvastatin 10 milliGRAM(s) Oral at bedtime  dextrose 5%. 1000 milliLiter(s) IV Continuous <Continuous>  dextrose 5%. 1000 milliLiter(s) IV Continuous <Continuous>  dextrose 50% Injectable 25 Gram(s) IV Push once  dextrose 50% Injectable 12.5 Gram(s) IV Push once  dextrose 50% Injectable 25 Gram(s) IV Push once  dextrose Oral Gel 15 Gram(s) Oral once  folic acid 1 milliGRAM(s) Oral daily  glucagon  Injectable 1 milliGRAM(s) IntraMuscular once  guaiFENesin Oral Liquid (Sugar-Free) 200 milliGRAM(s) Oral every 6 hours  insulin glargine Injectable (LANTUS) 22 Unit(s) SubCutaneous at bedtime  insulin lispro (ADMELOG) corrective regimen sliding scale   SubCutaneous <User Schedule>  insulin lispro (ADMELOG) corrective regimen sliding scale   SubCutaneous three times a day before meals  insulin lispro Injectable (ADMELOG) 18 Unit(s) SubCutaneous before breakfast  insulin lispro Injectable (ADMELOG) 6 Unit(s) SubCutaneous before dinner  insulin lispro Injectable (ADMELOG) 6 Unit(s) SubCutaneous before lunch  melatonin 3 milliGRAM(s) Oral at bedtime PRN  metoprolol succinate  milliGRAM(s) Oral daily  pantoprazole    Tablet 40 milliGRAM(s) Oral before breakfast  polyethylene glycol 3350 17 Gram(s) Oral daily  potassium phosphate / sodium phosphate Powder (PHOS-NaK) 1 Packet(s) Oral every 2 hours  predniSONE   Tablet 60 milliGRAM(s) Oral daily  senna 1 Tablet(s) Oral daily  simethicone 80 milliGRAM(s) Chew daily  sodium chloride 3%  Inhalation 4 milliLiter(s) Inhalation every 12 hours  tacrolimus 1.5 milliGRAM(s) Oral <User Schedule>      Vital Signs Last 24 Hrs  T(C): 36.6 (14 Mar 2023 11:22), Max: 36.6 (14 Mar 2023 11:22)  T(F): 97.9 (14 Mar 2023 11:22), Max: 97.9 (14 Mar 2023 11:22)  HR: 96 (14 Mar 2023 11:) (96 - 103)  BP: 120/84 (14 Mar 2023 11:) (120/84 - 135/94)  BP(mean): --  RR: 18 (14 Mar 2023 11:) (18 - 18)  SpO2: 99% (14 Mar 2023 11:) (96% - 99%)    Parameters below as of 14 Mar 2023 11:22  Patient On (Oxygen Delivery Method): room air        PHYSICAL EXAM:  General: [x ] non-toxic  HEAD/EYES: [ ] PERRL [x ] white sclera [ ] icterus  ENT:  [ ] normal [ x] supple [ ] thrush [ ] pharyngeal exudate  Cardiovascular:   [ ] murmur [x ] normal [ ] PPM/AICD  Respiratory:  x[ ] clear to ausculation bilaterally  GI:  [ x] soft, non-tender, normal bowel sounds  :  [ ] diaz [ ] no CVA tenderness   Musculoskeletal:  [ ] no synovitis  Neurologic:  [ ] non-focal exam   Skin:  [x ] no rash  Lymph: [x ] no lymphadenopathy  Psychiatric:  [ ] appropriate affect [ ] alert & oriented  Lines:  [ x] no phlebitis [ ] central line                                11.1   20.26 )-----------( 364      ( 14 Mar 2023 11:38 )             36.7       03-14    141  |  108  |  38<H>  ----------------------------<  241<H>  4.9   |  21<L>  |  1.55<H>    Ca    7.1<L>      14 Mar 2023 11:38  Phos  1.7     03-14  Mg     1.5     03-14        Urinalysis Basic - ( 12 Mar 2023 17:13 )    Color: Yellow / Appearance: Clear / S.029 / pH: x  Gluc: x / Ketone: Negative  / Bili: Negative / Urobili: Negative   Blood: x / Protein: 30 mg/dL / Nitrite: Negative   Leuk Esterase: Negative / RBC: 2 /hpf / WBC 2 /HPF   Sq Epi: x / Non Sq Epi: 1 /hpf / Bacteria: Negative        MICROBIOLOGY:Culture Results:   No growth to date. (23 @ 09:06)  Culture Results:   No growth to date. (23 @ 09:06)  Culture Results:   Normal Respiratory Heaven present (23 @ 17:54)  Culture Results:   No growth (23 @ 17:13)  Culture Results:   No Growth Final (23 @ 08:00)  Culture Results:   No Growth Final (23 @ 07:45)  Culture Results:   Normal Respiratory Heaven present (23 @ 17:45)      RADIOLOGY:

## 2023-03-14 NOTE — PROGRESS NOTE ADULT - PROBLEM SELECTOR PLAN 2
- obtain tacro level 30min prior to administration in AM  - dose tac for goal 6-8  - s/u transplant ID, WBC rising   - pulm consult for bronch but not indicated per them  - continue home bactrim ppx  - con't valganciclovir ppx  - c/w antifungal.

## 2023-03-14 NOTE — PROGRESS NOTE ADULT - PROBLEM SELECTOR PLAN 5
Prednisone-induced hyperglycemia w/ blood glucose elevated to 400-500 on admission. Based on outpatient records, patient has been hyperglycemic in the past, last A1c 4.7; not previously on insulin  - endocrine consulted for hyperglycemia  - on lantus 22 units and admelog 18-4-4  - has been set up for home nursing with emphasis on insulin teaching

## 2023-03-14 NOTE — PROGRESS NOTE ADULT - ATTENDING COMMENTS
above plans discussed with Dr. [place]    # hMPV PNA  # leukocytosis  # NORMAN  # Aflutter with RVR  # AIHA on high dose steroids  # steroid induced hyperglycemia  # s/p heart transplant  # hx of C.diff colitis    - worsening leukocytosis past few days even thought pt remains hemodynamically stable without any localizing symptoms  - appreciate ID recs: care discussed with Dr. Powers (ID) - agree with obtain CT chest/abd/pelv to rule out infectious etiologies and possible pulm consult for bronchoscopy if CT chest still with mucus plugging   - prednisone dose reduced to 60mg daily  - FS now better controlled on current regimen  - obtain tacro level  - appreciate transplant cards: no indication for EMBx at this time; care discussed with Radha  - appreciate interdisciplinary team recommendations from transplant cards, ID, ethan Trevizo MD  Division of Hospital Medicine  Contact via [employer] Teams  Office: [phone]

## 2023-03-14 NOTE — PROGRESS NOTE ADULT - PROBLEM SELECTOR PLAN 4
- on last admission it was stated by heme to avoid blood transfusion unless he is significantly symptomatic due to risk of hyperhemolysis. Of note if a transfusion is needed it is ok from heart transplant perspective.  - appreciate heme/onc consult for hemolytic anemia management and prednisone dosing given concern for oppurtunistic infection  - currently on pred 60 mg daily   - needs insulin diabetic diet teaching as glucose not well controlled.

## 2023-03-14 NOTE — PROGRESS NOTE ADULT - ASSESSMENT
71y/o M w/h/o HTN, HLD, NICM, chronic systolic heart failure s/p HM2 LVAD (6/2017) s/p heart transplant from Hep. C donor (treated) 2/23/18 (post op course complicated by graft dysfunction treated by plasmapheresis, IVIG, and rituximab). No DM hx per pt report. Here w/ chest tightness x1d found to be in aflutter 2/2 pneumonia and more recently found to have Hemolytic anemia> on steroids. Endocrinology consulted for management steroid induced hyperglycemia. Tolerating POs with BG variable day to day due to steroids and also nutritional indiscretions> eating grapes right before lunch time and POC testing and fig-newtons on and off . Prandial hyperglycemia today. Will adjust insulin to BG goal (100-180mg/dl).     Spoke to pt about steroid hyperglycemia and need for insulin while on steroids> pt states he didn't have DM before and doesn't understand why he does now. Explained effects of steroid on glycemic value. pt verbalized understanding but has trouble accepting he has hyperglycemia and has to be careful with his diet.

## 2023-03-14 NOTE — PROGRESS NOTE ADULT - ASSESSMENT
71 y/o male with hx of nonischemic cardiomyopathy s/p HM2 LVAD, underwent OHT 2/23/18 with hepatitis C donor, hx of hemolytic anemia presented w/ 1d chest tightness, found to have new onset aflutter w/ RVR, Patient found to have human metapneumovirus with likely superimposed bacterial infection given elevated procal and CT findings of RLL and RML collapse. Course c/b elevated fungitell and persistently high white count

## 2023-03-14 NOTE — PROGRESS NOTE ADULT - PROBLEM SELECTOR PLAN 3
Home regimen: pravastatin 20mg daily  - LDL goal <70 . Pt LDL 51  - Continue atorvastatin 10mg daily while in patient  - Restart home statin upon discharge if not contraindicated.

## 2023-03-14 NOTE — PROGRESS NOTE ADULT - SUBJECTIVE AND OBJECTIVE BOX
DIABETES FOLLOW UP NOTE: Saw pt earlier today    Chief Complaint: Endocrine consult requested for management of T2DM    INTERVAL HX: Pt stable, reports tolerating POs with BG above goal  between 100s to 300s in the last 24 hours. On Prednisone 60mg daily. No further hypoglycemia. Pt reports feeling better but weak. No SOB/CP reported at time of visit.  Pt states he is tired of prolonged hospital stay.      Review of Systems:  General: As above  Cardiovascular: No chest pain, palpitations  Respiratory: No SOB, no cough  GI: No nausea, vomiting, abdominal pain  Endocrine: No polyuria, polydipsia or S&Sx of hypoglycemia    Allergies    No Known Allergies    Intolerances      MEDICATIONS:  atorvastatin 10 milliGRAM(s) Oral at bedtime  insulin glargine Injectable (LANTUS) 22 Unit(s) SubCutaneous at bedtime  insulin lispro (ADMELOG) corrective regimen sliding scale   SubCutaneous <User Schedule>  insulin lispro (ADMELOG) corrective regimen sliding scale   SubCutaneous three times a day before meals  insulin lispro Injectable (ADMELOG) 18 Unit(s) SubCutaneous before breakfast  insulin lispro Injectable (ADMELOG) 6 Unit(s) SubCutaneous before dinner  insulin lispro Injectable (ADMELOG) 6 Unit(s) SubCutaneous before lunch  predniSONE   Tablet 60 milliGRAM(s) Oral daily  tacrolimus 1.5 milliGRAM(s) Oral <User Schedule>  trimethoprim   80 mG/sulfamethoxazole 400 mG 1 Tablet(s) Oral every 24 hours  valGANciclovir 450 milliGRAM(s) Oral daily      PHYSICAL EXAM:  VITALS: T(C): 36.6 (03-14-23 @ 11:22)  T(F): 97.9 (03-14-23 @ 11:22), Max: 97.9 (03-14-23 @ 11:22)  HR: 96 (03-14-23 @ 11:22) (96 - 103)  BP: 120/84 (03-14-23 @ 11:22) (120/84 - 135/94)  RR:  (18 - 18)  SpO2:  (96% - 99%)  Wt(kg): --  GENERAL: Male sitting in chair in NAD  Abdomen: Soft, nontender, non distended, central adiposity  Extremities: Warm, no edema in all 4 exts  NEURO: A&O X3    LABS:  POCT Blood Glucose.: 263 mg/dL (03-14-23 @ 17:20)  POCT Blood Glucose.: 352 mg/dL (03-14-23 @ 12:44)  POCT Blood Glucose.: 176 mg/dL (03-14-23 @ 08:59)  POCT Blood Glucose.: 159 mg/dL (03-14-23 @ 01:09)  POCT Blood Glucose.: 197 mg/dL (03-13-23 @ 21:47)  POCT Blood Glucose.: 289 mg/dL (03-13-23 @ 17:16)  POCT Blood Glucose.: 177 mg/dL (03-13-23 @ 12:53)  POCT Blood Glucose.: 146 mg/dL (03-13-23 @ 08:41)  POCT Blood Glucose.: 227 mg/dL (03-13-23 @ 02:35)  POCT Blood Glucose.: 225 mg/dL (03-12-23 @ 21:30)  POCT Blood Glucose.: 195 mg/dL (03-12-23 @ 17:29)  POCT Blood Glucose.: 205 mg/dL (03-12-23 @ 12:36)  POCT Blood Glucose.: 151 mg/dL (03-12-23 @ 08:59)  POCT Blood Glucose.: 196 mg/dL (03-12-23 @ 01:57)  POCT Blood Glucose.: 338 mg/dL (03-11-23 @ 21:27)                            11.1   20.26 )-----------( 364      ( 14 Mar 2023 11:38 )             36.7       03-14    141  |  108  |  38<H>  ----------------------------<  241<H>  4.9   |  21<L>  |  1.55<H>    eGFR: 47<L>    Ca    7.1<L>      03-14  Mg     1.5     03-14  Phos  1.7     03-14    TPro  5.8<L>  /  Alb  3.5  /  TBili  0.7  /  DBili  0.2  /  AST  33  /  ALT  29  /  AlkPhos  69  03-14      Thyroid Function Tests:  03-03 @ 15:09 TSH 1.06 FreeT4 -- T3 -- Anti TPO -- Anti Thyroglobulin Ab -- TSI --      A1C with Estimated Average Glucose Result: 4.7 % (02-17-23 @ 11:59)      Estimated Average Glucose: 88 mg/dL (02-17-23 @ 11:59)        02-17 Chol 143 Direct LDL -- LDL calculated 51 HDL 69 Trig 116

## 2023-03-15 ENCOUNTER — APPOINTMENT (OUTPATIENT)
Dept: HEART FAILURE | Facility: CLINIC | Age: 73
End: 2023-03-15

## 2023-03-15 LAB
ALBUMIN SERPL ELPH-MCNC: 3.5 G/DL — SIGNIFICANT CHANGE UP (ref 3.3–5)
ALP SERPL-CCNC: 70 U/L — SIGNIFICANT CHANGE UP (ref 40–120)
ALT FLD-CCNC: 26 U/L — SIGNIFICANT CHANGE UP (ref 10–45)
ANION GAP SERPL CALC-SCNC: 16 MMOL/L — SIGNIFICANT CHANGE UP (ref 5–17)
ANISOCYTOSIS BLD QL: SLIGHT — SIGNIFICANT CHANGE UP
AST SERPL-CCNC: 25 U/L — SIGNIFICANT CHANGE UP (ref 10–40)
BASE EXCESS BLDV CALC-SCNC: -3.6 MMOL/L — LOW (ref -2–3)
BASOPHILS # BLD AUTO: 0 K/UL — SIGNIFICANT CHANGE UP (ref 0–0.2)
BASOPHILS NFR BLD AUTO: 0 % — SIGNIFICANT CHANGE UP (ref 0–2)
BILIRUB SERPL-MCNC: 0.7 MG/DL — SIGNIFICANT CHANGE UP (ref 0.2–1.2)
BUN SERPL-MCNC: 39 MG/DL — HIGH (ref 7–23)
CA-I SERPL-SCNC: 1 MMOL/L — LOW (ref 1.15–1.33)
CALCIUM SERPL-MCNC: 7 MG/DL — LOW (ref 8.4–10.5)
CHLORIDE BLDV-SCNC: 106 MMOL/L — SIGNIFICANT CHANGE UP (ref 96–108)
CHLORIDE SERPL-SCNC: 109 MMOL/L — HIGH (ref 96–108)
CO2 BLDV-SCNC: 26 MMOL/L — SIGNIFICANT CHANGE UP (ref 22–26)
CO2 SERPL-SCNC: 20 MMOL/L — LOW (ref 22–31)
CREAT SERPL-MCNC: 1.59 MG/DL — HIGH (ref 0.5–1.3)
DACRYOCYTES BLD QL SMEAR: SLIGHT — SIGNIFICANT CHANGE UP
EGFR: 46 ML/MIN/1.73M2 — LOW
EOSINOPHIL # BLD AUTO: 0 K/UL — SIGNIFICANT CHANGE UP (ref 0–0.5)
EOSINOPHIL NFR BLD AUTO: 0 % — SIGNIFICANT CHANGE UP (ref 0–6)
GAS PNL BLDV: 138 MMOL/L — SIGNIFICANT CHANGE UP (ref 136–145)
GAS PNL BLDV: SIGNIFICANT CHANGE UP
GAS PNL BLDV: SIGNIFICANT CHANGE UP
GLUCOSE BLDC GLUCOMTR-MCNC: 134 MG/DL — HIGH (ref 70–99)
GLUCOSE BLDC GLUCOMTR-MCNC: 158 MG/DL — HIGH (ref 70–99)
GLUCOSE BLDC GLUCOMTR-MCNC: 191 MG/DL — HIGH (ref 70–99)
GLUCOSE BLDC GLUCOMTR-MCNC: 269 MG/DL — HIGH (ref 70–99)
GLUCOSE BLDC GLUCOMTR-MCNC: 445 MG/DL — HIGH (ref 70–99)
GLUCOSE BLDC GLUCOMTR-MCNC: 457 MG/DL — CRITICAL HIGH (ref 70–99)
GLUCOSE BLDC GLUCOMTR-MCNC: 71 MG/DL — SIGNIFICANT CHANGE UP (ref 70–99)
GLUCOSE BLDC GLUCOMTR-MCNC: 75 MG/DL — SIGNIFICANT CHANGE UP (ref 70–99)
GLUCOSE BLDV-MCNC: 240 MG/DL — HIGH (ref 70–99)
GLUCOSE SERPL-MCNC: 237 MG/DL — HIGH (ref 70–99)
HCO3 BLDV-SCNC: 24 MMOL/L — SIGNIFICANT CHANGE UP (ref 22–29)
HCT VFR BLD CALC: 35.2 % — LOW (ref 39–50)
HCT VFR BLDA CALC: 33 % — LOW (ref 39–51)
HGB BLD CALC-MCNC: 11.1 G/DL — LOW (ref 12.6–17.4)
HGB BLD-MCNC: 10.8 G/DL — LOW (ref 13–17)
LACTATE BLDV-MCNC: 3.2 MMOL/L — HIGH (ref 0.5–2)
LYMPHOCYTES # BLD AUTO: 0.64 K/UL — LOW (ref 1–3.3)
LYMPHOCYTES # BLD AUTO: 3.4 % — LOW (ref 13–44)
MACROCYTES BLD QL: SLIGHT — SIGNIFICANT CHANGE UP
MAGNESIUM SERPL-MCNC: 1.6 MG/DL — SIGNIFICANT CHANGE UP (ref 1.6–2.6)
MANUAL SMEAR VERIFICATION: SIGNIFICANT CHANGE UP
MCHC RBC-ENTMCNC: 30.7 GM/DL — LOW (ref 32–36)
MCHC RBC-ENTMCNC: 33.4 PG — SIGNIFICANT CHANGE UP (ref 27–34)
MCV RBC AUTO: 109 FL — HIGH (ref 80–100)
METAMYELOCYTES # FLD: 0.9 % — HIGH (ref 0–0)
MONOCYTES # BLD AUTO: 0.81 K/UL — SIGNIFICANT CHANGE UP (ref 0–0.9)
MONOCYTES NFR BLD AUTO: 4.3 % — SIGNIFICANT CHANGE UP (ref 2–14)
MYELOCYTES NFR BLD: 0.9 % — HIGH (ref 0–0)
NEUTROPHILS # BLD AUTO: 16.95 K/UL — HIGH (ref 1.8–7.4)
NEUTROPHILS NFR BLD AUTO: 87.9 % — HIGH (ref 43–77)
NEUTS BAND # BLD: 2.6 % — SIGNIFICANT CHANGE UP (ref 0–8)
NRBC # BLD: 2 /100 — HIGH (ref 0–0)
NT-PROBNP SERPL-SCNC: 519 PG/ML — HIGH (ref 0–300)
OVALOCYTES BLD QL SMEAR: SLIGHT — SIGNIFICANT CHANGE UP
PCO2 BLDV: 55 MMHG — SIGNIFICANT CHANGE UP (ref 42–55)
PH BLDV: 7.25 — LOW (ref 7.32–7.43)
PHOSPHATE SERPL-MCNC: 1.6 MG/DL — LOW (ref 2.5–4.5)
PLAT MORPH BLD: NORMAL — SIGNIFICANT CHANGE UP
PLATELET # BLD AUTO: 331 K/UL — SIGNIFICANT CHANGE UP (ref 150–400)
PO2 BLDV: 27 MMHG — SIGNIFICANT CHANGE UP (ref 25–45)
POIKILOCYTOSIS BLD QL AUTO: SLIGHT — SIGNIFICANT CHANGE UP
POLYCHROMASIA BLD QL SMEAR: SLIGHT — SIGNIFICANT CHANGE UP
POTASSIUM BLDV-SCNC: 5.1 MMOL/L — SIGNIFICANT CHANGE UP (ref 3.5–5.1)
POTASSIUM SERPL-MCNC: 5.3 MMOL/L — SIGNIFICANT CHANGE UP (ref 3.5–5.3)
POTASSIUM SERPL-SCNC: 5.3 MMOL/L — SIGNIFICANT CHANGE UP (ref 3.5–5.3)
PROCALCITONIN SERPL-MCNC: 0.2 NG/ML — HIGH (ref 0.02–0.1)
PROT SERPL-MCNC: 5.7 G/DL — LOW (ref 6–8.3)
RBC # BLD: 3.23 M/UL — LOW (ref 4.2–5.8)
RBC # FLD: 17 % — HIGH (ref 10.3–14.5)
RBC BLD AUTO: ABNORMAL
SAO2 % BLDV: 40.8 % — LOW (ref 67–88)
SODIUM SERPL-SCNC: 145 MMOL/L — SIGNIFICANT CHANGE UP (ref 135–145)
SPHEROCYTES BLD QL SMEAR: SLIGHT — SIGNIFICANT CHANGE UP
TACROLIMUS SERPL-MCNC: 2.1 NG/ML — SIGNIFICANT CHANGE UP
WBC # BLD: 18.73 K/UL — HIGH (ref 3.8–10.5)
WBC # FLD AUTO: 18.73 K/UL — HIGH (ref 3.8–10.5)

## 2023-03-15 PROCEDURE — 99232 SBSQ HOSP IP/OBS MODERATE 35: CPT

## 2023-03-15 PROCEDURE — 99233 SBSQ HOSP IP/OBS HIGH 50: CPT | Mod: GC

## 2023-03-15 PROCEDURE — 93010 ELECTROCARDIOGRAM REPORT: CPT

## 2023-03-15 PROCEDURE — 93970 EXTREMITY STUDY: CPT | Mod: 26

## 2023-03-15 PROCEDURE — 99222 1ST HOSP IP/OBS MODERATE 55: CPT

## 2023-03-15 RX ORDER — INSULIN GLARGINE 100 [IU]/ML
11 INJECTION, SOLUTION SUBCUTANEOUS AT BEDTIME
Refills: 0 | Status: DISCONTINUED | OUTPATIENT
Start: 2023-03-15 | End: 2023-03-16

## 2023-03-15 RX ORDER — SODIUM,POTASSIUM PHOSPHATES 278-250MG
1 POWDER IN PACKET (EA) ORAL
Refills: 0 | Status: COMPLETED | OUTPATIENT
Start: 2023-03-15 | End: 2023-03-16

## 2023-03-15 RX ORDER — INSULIN LISPRO 100/ML
24 VIAL (ML) SUBCUTANEOUS
Refills: 0 | Status: DISCONTINUED | OUTPATIENT
Start: 2023-03-16 | End: 2023-03-16

## 2023-03-15 RX ADMIN — Medication 500000 UNIT(S): at 16:22

## 2023-03-15 RX ADMIN — Medication 10 UNIT(S): at 13:38

## 2023-03-15 RX ADMIN — APIXABAN 5 MILLIGRAM(S): 2.5 TABLET, FILM COATED ORAL at 18:00

## 2023-03-15 RX ADMIN — Medication 1 TABLET(S): at 10:35

## 2023-03-15 RX ADMIN — Medication 81 MILLIGRAM(S): at 13:36

## 2023-03-15 RX ADMIN — PANTOPRAZOLE SODIUM 40 MILLIGRAM(S): 20 TABLET, DELAYED RELEASE ORAL at 05:41

## 2023-03-15 RX ADMIN — Medication 20 UNIT(S): at 09:16

## 2023-03-15 RX ADMIN — APIXABAN 5 MILLIGRAM(S): 2.5 TABLET, FILM COATED ORAL at 05:41

## 2023-03-15 RX ADMIN — Medication 200 MILLIGRAM(S): at 05:42

## 2023-03-15 RX ADMIN — VALGANCICLOVIR 450 MILLIGRAM(S): 450 TABLET, FILM COATED ORAL at 13:34

## 2023-03-15 RX ADMIN — Medication 200 MILLIGRAM(S): at 13:35

## 2023-03-15 RX ADMIN — Medication 1 MILLIGRAM(S): at 13:37

## 2023-03-15 RX ADMIN — Medication 650 MILLIGRAM(S): at 18:27

## 2023-03-15 RX ADMIN — INSULIN GLARGINE 11 UNIT(S): 100 INJECTION, SOLUTION SUBCUTANEOUS at 23:49

## 2023-03-15 RX ADMIN — Medication 200 MILLIGRAM(S): at 18:00

## 2023-03-15 RX ADMIN — TACROLIMUS 1.5 MILLIGRAM(S): 5 CAPSULE ORAL at 22:17

## 2023-03-15 RX ADMIN — Medication 200 MILLIGRAM(S): at 00:55

## 2023-03-15 RX ADMIN — Medication 500000 UNIT(S): at 22:17

## 2023-03-15 RX ADMIN — Medication 60 MILLIGRAM(S): at 05:42

## 2023-03-15 RX ADMIN — Medication 200 MILLIGRAM(S): at 05:41

## 2023-03-15 RX ADMIN — Medication 6: at 17:47

## 2023-03-15 RX ADMIN — Medication 1 PACKET(S): at 18:00

## 2023-03-15 RX ADMIN — SIMETHICONE 80 MILLIGRAM(S): 80 TABLET, CHEWABLE ORAL at 13:37

## 2023-03-15 RX ADMIN — Medication 8 UNIT(S): at 17:49

## 2023-03-15 RX ADMIN — TACROLIMUS 1.5 MILLIGRAM(S): 5 CAPSULE ORAL at 10:35

## 2023-03-15 RX ADMIN — Medication 12: at 13:38

## 2023-03-15 RX ADMIN — Medication 500000 UNIT(S): at 09:16

## 2023-03-15 RX ADMIN — Medication 2: at 09:17

## 2023-03-15 RX ADMIN — Medication 500000 UNIT(S): at 13:35

## 2023-03-15 RX ADMIN — ATORVASTATIN CALCIUM 10 MILLIGRAM(S): 80 TABLET, FILM COATED ORAL at 22:16

## 2023-03-15 NOTE — PROGRESS NOTE ADULT - SUBJECTIVE AND OBJECTIVE BOX
INTERVAL HPI/OVERNIGHT EVENTS:    SUBJECTIVE: Patient seen and examined at bedside.    OBJECTIVE:    VITAL SIGNS:  ICU Vital Signs Last 24 Hrs  T(C): 36.3 (15 Mar 2023 04:15), Max: 36.6 (14 Mar 2023 11:22)  T(F): 97.3 (15 Mar 2023 04:15), Max: 97.9 (14 Mar 2023 11:22)  HR: 97 (15 Mar 2023 04:15) (96 - 108)  BP: 133/87 (15 Mar 2023 04:15) (120/84 - 134/86)  BP(mean): --  ABP: --  ABP(mean): --  RR: 18 (15 Mar 2023 04:15) (18 - 18)  SpO2: 100% (15 Mar 2023 04:15) (99% - 100%)    O2 Parameters below as of 15 Mar 2023 04:15  Patient On (Oxygen Delivery Method): room air              03-14 @ 07:01  -  03-15 @ 07:00  --------------------------------------------------------  IN: 590 mL / OUT: 500 mL / NET: 90 mL      CAPILLARY BLOOD GLUCOSE      POCT Blood Glucose.: 158 mg/dL (15 Mar 2023 02:04)      PHYSICAL EXAM:    General: NAD  HEENT: NC/AT; PERRL, clear conjunctiva  Neck: supple  Respiratory: CTA b/l  Cardiovascular: +S1/S2; RRR  Abdomen: soft, NT/ND; +BS x4  Extremities:  no LE edema  Vascular: WWP, 2+ peripheral pulses b/l;  Skin: normal color and turgor; no rash  Neurological: A&Ox3, move all extremities. CN II-XII intact    MEDICATIONS:  MEDICATIONS  (STANDING):  albuterol/ipratropium for Nebulization 3 milliLiter(s) Nebulizer every 6 hours  apixaban 5 milliGRAM(s) Oral every 12 hours  aspirin enteric coated 81 milliGRAM(s) Oral daily  atorvastatin 10 milliGRAM(s) Oral at bedtime  dextrose 5%. 1000 milliLiter(s) (50 mL/Hr) IV Continuous <Continuous>  dextrose 5%. 1000 milliLiter(s) (100 mL/Hr) IV Continuous <Continuous>  dextrose 50% Injectable 25 Gram(s) IV Push once  dextrose 50% Injectable 12.5 Gram(s) IV Push once  dextrose 50% Injectable 25 Gram(s) IV Push once  dextrose Oral Gel 15 Gram(s) Oral once  folic acid 1 milliGRAM(s) Oral daily  glucagon  Injectable 1 milliGRAM(s) IntraMuscular once  guaiFENesin Oral Liquid (Sugar-Free) 200 milliGRAM(s) Oral every 6 hours  insulin glargine Injectable (LANTUS) 22 Unit(s) SubCutaneous at bedtime  insulin lispro (ADMELOG) corrective regimen sliding scale   SubCutaneous three times a day before meals  insulin lispro (ADMELOG) corrective regimen sliding scale   SubCutaneous <User Schedule>  insulin lispro Injectable (ADMELOG) 20 Unit(s) SubCutaneous before breakfast  insulin lispro Injectable (ADMELOG) 8 Unit(s) SubCutaneous before dinner  insulin lispro Injectable (ADMELOG) 10 Unit(s) SubCutaneous before lunch  metoprolol succinate  milliGRAM(s) Oral daily  nystatin    Suspension 565222 Unit(s) Oral <User Schedule>  pantoprazole    Tablet 40 milliGRAM(s) Oral before breakfast  polyethylene glycol 3350 17 Gram(s) Oral daily  predniSONE   Tablet 60 milliGRAM(s) Oral daily  senna 1 Tablet(s) Oral daily  simethicone 80 milliGRAM(s) Chew daily  sodium chloride 3%  Inhalation 4 milliLiter(s) Inhalation every 12 hours  tacrolimus 1.5 milliGRAM(s) Oral <User Schedule>  trimethoprim   80 mG/sulfamethoxazole 400 mG 1 Tablet(s) Oral every 24 hours  valGANciclovir 450 milliGRAM(s) Oral daily    MEDICATIONS  (PRN):  acetaminophen     Tablet .. 650 milliGRAM(s) Oral every 6 hours PRN Temp greater or equal to 38C (100.4F), Mild Pain (1 - 3)  melatonin 3 milliGRAM(s) Oral at bedtime PRN Insomnia      ALLERGIES:  Allergies    No Known Allergies    Intolerances        LABS:                        11.1   20.26 )-----------( 364      ( 14 Mar 2023 11:38 )             36.7     03-14    141  |  108  |  38<H>  ----------------------------<  241<H>  4.9   |  21<L>  |  1.55<H>    Ca    7.1<L>      14 Mar 2023 11:38  Phos  1.7     03-14  Mg     1.5     03-14    TPro  5.8<L>  /  Alb  3.5  /  TBili  0.7  /  DBili  0.2  /  AST  33  /  ALT  29  /  AlkPhos  69  03-14          RADIOLOGY & ADDITIONAL TESTS: Reviewed. INTERVAL HPI/OVERNIGHT EVENTS: Patient reportedly angry with other patient overnight.     SUBJECTIVE: Patient seen and examined at bedside. Patient unable to demonstrate understanding as to why he is still in hospital.     OBJECTIVE:    VITAL SIGNS:  ICU Vital Signs Last 24 Hrs  T(C): 36.3 (15 Mar 2023 04:15), Max: 36.6 (14 Mar 2023 11:22)  T(F): 97.3 (15 Mar 2023 04:15), Max: 97.9 (14 Mar 2023 11:22)  HR: 97 (15 Mar 2023 04:15) (96 - 108)  BP: 133/87 (15 Mar 2023 04:15) (120/84 - 134/86)  BP(mean): --  ABP: --  ABP(mean): --  RR: 18 (15 Mar 2023 04:15) (18 - 18)  SpO2: 100% (15 Mar 2023 04:15) (99% - 100%)    O2 Parameters below as of 15 Mar 2023 04:15  Patient On (Oxygen Delivery Method): room air              03-14 @ 07:01  -  03-15 @ 07:00  --------------------------------------------------------  IN: 590 mL / OUT: 500 mL / NET: 90 mL      CAPILLARY BLOOD GLUCOSE      POCT Blood Glucose.: 158 mg/dL (15 Mar 2023 02:04)      PHYSICAL EXAM: Patient refusing physical exam.      MEDICATIONS:  MEDICATIONS  (STANDING):  albuterol/ipratropium for Nebulization 3 milliLiter(s) Nebulizer every 6 hours  apixaban 5 milliGRAM(s) Oral every 12 hours  aspirin enteric coated 81 milliGRAM(s) Oral daily  atorvastatin 10 milliGRAM(s) Oral at bedtime  dextrose 5%. 1000 milliLiter(s) (50 mL/Hr) IV Continuous <Continuous>  dextrose 5%. 1000 milliLiter(s) (100 mL/Hr) IV Continuous <Continuous>  dextrose 50% Injectable 25 Gram(s) IV Push once  dextrose 50% Injectable 12.5 Gram(s) IV Push once  dextrose 50% Injectable 25 Gram(s) IV Push once  dextrose Oral Gel 15 Gram(s) Oral once  folic acid 1 milliGRAM(s) Oral daily  glucagon  Injectable 1 milliGRAM(s) IntraMuscular once  guaiFENesin Oral Liquid (Sugar-Free) 200 milliGRAM(s) Oral every 6 hours  insulin glargine Injectable (LANTUS) 22 Unit(s) SubCutaneous at bedtime  insulin lispro (ADMELOG) corrective regimen sliding scale   SubCutaneous three times a day before meals  insulin lispro (ADMELOG) corrective regimen sliding scale   SubCutaneous <User Schedule>  insulin lispro Injectable (ADMELOG) 20 Unit(s) SubCutaneous before breakfast  insulin lispro Injectable (ADMELOG) 8 Unit(s) SubCutaneous before dinner  insulin lispro Injectable (ADMELOG) 10 Unit(s) SubCutaneous before lunch  metoprolol succinate  milliGRAM(s) Oral daily  nystatin    Suspension 838058 Unit(s) Oral <User Schedule>  pantoprazole    Tablet 40 milliGRAM(s) Oral before breakfast  polyethylene glycol 3350 17 Gram(s) Oral daily  predniSONE   Tablet 60 milliGRAM(s) Oral daily  senna 1 Tablet(s) Oral daily  simethicone 80 milliGRAM(s) Chew daily  sodium chloride 3%  Inhalation 4 milliLiter(s) Inhalation every 12 hours  tacrolimus 1.5 milliGRAM(s) Oral <User Schedule>  trimethoprim   80 mG/sulfamethoxazole 400 mG 1 Tablet(s) Oral every 24 hours  valGANciclovir 450 milliGRAM(s) Oral daily    MEDICATIONS  (PRN):  acetaminophen     Tablet .. 650 milliGRAM(s) Oral every 6 hours PRN Temp greater or equal to 38C (100.4F), Mild Pain (1 - 3)  melatonin 3 milliGRAM(s) Oral at bedtime PRN Insomnia      ALLERGIES:  Allergies    No Known Allergies    Intolerances        LABS:                        11.1   20.26 )-----------( 364      ( 14 Mar 2023 11:38 )             36.7     03-14    141  |  108  |  38<H>  ----------------------------<  241<H>  4.9   |  21<L>  |  1.55<H>    Ca    7.1<L>      14 Mar 2023 11:38  Phos  1.7     03-14  Mg     1.5     03-14    TPro  5.8<L>  /  Alb  3.5  /  TBili  0.7  /  DBili  0.2  /  AST  33  /  ALT  29  /  AlkPhos  69  03-14          RADIOLOGY & ADDITIONAL TESTS: Reviewed.

## 2023-03-15 NOTE — BH CONSULTATION LIAISON ASSESSMENT NOTE - HPI (INCLUDE ILLNESS QUALITY, SEVERITY, DURATION, TIMING, CONTEXT, MODIFYING FACTORS, ASSOCIATED SIGNS AND SYMPTOMS)
Pt is a 73 y/o black male with hx of nonischemic cardiomyopathy s/p HM2 LVAD, underwent OHT 2/23/18 with hepatitis C donor, hx of hemolytic anemia presented w/ 1d chest tightness, found to have new onset aflutter w/ RVR, and found to have human metapneumovirus PNA presents with mild confusion. psych called for confusion. pt seen, states it's "2003," and appeared irritable and admitted to feeling confused. pt poor historian, answered most of the interview questions as "Ask my Doctor, I don't know anything." Pt denies si/hi, admits to fragmented sleep and fair appetite. pt denies substance abuse history, and denies past psych history. upon further questioning, pt stared out the window.   Pt was confused, with high WBC and elevated blood sugars.

## 2023-03-15 NOTE — PROGRESS NOTE ADULT - PROBLEM SELECTOR PLAN 8
Continue home tacrolimus 5mg qd; target level 6-8  - f/u tacro level  Continue prednisone 75mg qd  Prophylaxis:   - Bactrim every other day   - Valganciclovir 450mg two times weekly   - Pantoprazole 40mg qd  - vancomycin oral   Off cellcept due to leukopenia, recurrent infections Followed by [facility] as outpatient; admitted 6.5 in Jan 2023, treated w/ 4d IV dexamethasone.   Per hematology, recommended to avoid blood transfusion unless patient is symptomatic due to risk of hyperhemolysis. Transfusion is okay from heart transplant perspective however per blood bank It will need to be emergent.   - maintain active T/S  - Hematology consulted for help with steroid management in setting of acute infection; to be discharge on new dose of prednisone 60 mg   - labs not concerning for hemolysis

## 2023-03-15 NOTE — PROGRESS NOTE ADULT - ASSESSMENT
71 year old M with a PMH of HTN, NICM c/b HFrEF s/p LVAD 2017, and then subsequently s/p heart transplant from HCV donor (treated) in 2018 with post-op course complicated by graft dysfunction, who initially presented to the ED with cough and chest tightness.     Cough  Afebrile, no leukocytosis  RVP + for hMPV   CT with impacted RML and RLL bronchi, and patchy consolidations in RUL and LLL  Initially on Vancomycin and Cefepime   Legionella Ur Ag negative, serum Cryptococcal Ag negative, EBV IgM negative  UA negative  Blood cultures from March 3rd with 1/4 bottles with Micrococcus Luteus   Fungitell 300  Procalcitonin 2.39, Cr 2->3 (NORMAN)  On Valcyte and Bactrim prophylaxis     OVERALL  hMPV pneumonia in heart transplant/immunocompromised recipient   R/o superimposed bacterial infection   Elevated Fungitell   1/4 BCx bottles with Micrococcus   [place], s/p heart transplant 2018  H/o HCV from donor s/p treatment   H/o C Diff in 2020     Treated with broad spectrum abx from 3/3 to 3/8 (zosyn then cefepime)  Non toxic appearing      DVTs- bilateral  may account for his leukocytosis  blood cultures sent 3/14 and NGTD      Case and plan d/w hospitalist   71 year old M with a PMH of HTN, NICM c/b HFrEF s/p LVAD 2017, and then subsequently s/p heart transplant from HCV donor (treated) in 2018 with post-op course complicated by graft dysfunction, who initially presented to the ED with cough and chest tightness.     Cough  Afebrile, but with leukocytosis  RVP + for hMPV   CT with impacted RML and RLL bronchi, and patchy consolidations in RUL and LLL  Initially on Vancomycin and Cefepime   Legionella Ur Ag negative, serum Cryptococcal Ag negative, EBV IgM negative  UA negative  Blood cultures from March 3rd with 1/4 bottles with Micrococcus Luteus   Fungitell 300  Procalcitonin 2.39, Cr 2->3 ([place]MAN)  On Valcyte and Bactrim prophylaxis     OVERALL  hMPV pneumonia in heart transplant/immunocompromised recipient   R/o superimposed bacterial infection- work up negative to date    Elevated Fungitell   1/4 BCx bottles with Micrococcus   [place], s/p heart transplant 2018  H/o HCV from donor s/p treatment   H/o C Diff in 2020     Treated with broad spectrum abx from 3/3 to 3/8 (zosyn then cefepime)  Non toxic appearing      DVTs- bilateral  may account for his leukocytosis  blood cultures sent 3/14 and NGTD      Case and plan d/w hospitalist    Gary Lujan MD  Can be called via Teams  After 5pm/weekends [phone]

## 2023-03-15 NOTE — PROGRESS NOTE ADULT - PROBLEM SELECTOR PLAN 6
Cr rising, Urine lytes consistent with pre-renal etiology   - Bladder scan negative for urinary retention   - monitor Cr  - holding losartan; can restart as needed for bp control  - s/p 1 L bolus; c/w maintenance fluids  - re-dosed ppx meds per improved CrCl (now 45) s/p OHT in 2/23/18  - continue home Toprol  mg PO QD  - continue home ASA 81 and  pravastatin 20 mg PO QD  - continue home pantoprazole 40 mg PO QD    - HOLD losartan 100mg qd due to NORMAN, dilt gtt if HR >120s  - underwent annual RHC/EMBx with Dr. Carpenter in February 2023

## 2023-03-15 NOTE — PROGRESS NOTE ADULT - PROBLEM SELECTOR PLAN 10
On febuxostat 40mg qd, followed by rheumatology Diet: DASH, Carb consistent  DVT: Eliquis 5mg BID  Dispo: Home with Home PT   Code Status: Full Code     Patient ask that we do not discuss this case with his family at this time.

## 2023-03-15 NOTE — PROGRESS NOTE ADULT - PROBLEM SELECTOR PLAN 3
Home regimen: pravastatin 20mg daily  - LDL goal <70 . Pt LDL 51  - Continue atorvastatin 10mg daily while in patient  - Restart home statin upon discharge if not contraindicated.      discussed w/pt and team  Can be reached via TEAMS/pager: [phone]   office:  [phone] (M-F 9a-5pm)               [phone] (nights/weekends)   Can access Amion coverage via sunrise/tools

## 2023-03-15 NOTE — PROGRESS NOTE ADULT - ATTENDING COMMENTS
Patient seen and examined at bedside. He overall looks well.   - WBC is now downtrending. So far infectious workup was negative. Had DVT study which showed new acute right DVT, was already started on eliquis earlier this admission for aflutter  - will consult heme/onc given history of hemolytic anemia regarding if any dose adjustments need to be made for his AC  - appreciate transplant ID recs  - Will adjust tac dose, based on the level (goal 6-8)  - history of hemolytic anemia with stable hemoglobin, on pred 60mg daily with appropriate ppx medications

## 2023-03-15 NOTE — PROGRESS NOTE ADULT - PROBLEM SELECTOR PLAN 7
s/p OHT in 2/23/18  - continue home Toprol  mg PO QD  - continue home ASA 81 and  pravastatin 20 mg PO QD  - continue home pantoprazole 40 mg PO QD    - HOLD losartan 100mg qd due to NORMAN, dilt gtt if HR >120s  - underwent annual RHC/EMBx with Dr. Carpenter in February 2023 Continue home tacrolimus 5mg qd; target level 6-8  - f/u tacro level  Continue prednisone 60mg qd  Prophylaxis:   - Bactrim every other day   - Valganciclovir 450mg two times weekly   - Pantoprazole 40mg qd  - vancomycin oral   Off cellcept due to leukopenia, recurrent infections

## 2023-03-15 NOTE — PROGRESS NOTE ADULT - ASSESSMENT
73y/o M w/h/o HTN, HLD, NICM, chronic systolic heart failure s/p HM2 LVAD (6/2017) s/p heart transplant from Hep. C donor (treated) 2/23/18 (post op course complicated by graft dysfunction treated by plasmapheresis, IVIG, and rituximab). No DM hx per pt report. Here w/ chest tightness x1d found to be in aflutter 2/2 pneumonia and more recently found to have Hemolytic anemia> on steroids. Endocrinology consulted for management steroid induced hyperglycemia. Tolerating POs with BG values variable in setting of dietary indiscretions.  Unable to fully assess DM insight as pt not cooperating with interview today. BG goal (100-180mg/dl).

## 2023-03-15 NOTE — PROGRESS NOTE ADULT - PROBLEM SELECTOR PLAN 9
Followed by [facility] as outpatient; admitted 6.5 in Jan 2023, treated w/ 4d IV dexamethasone.   Per hematology, recommended to avoid blood transfusion unless patient is symptomatic due to risk of hyperhemolysis. Transfusion is okay from heart transplant perspective however per blood bank It will need to be emergent.   - maintain active T/S  - Hematology consulted for help with steroid management in setting of acute infection; to be discharge on new dose of prednisone 60 mg   - labs not concerning for hemolysis On febuxostat 40mg qd, followed by rheumatology

## 2023-03-15 NOTE — PROGRESS NOTE ADULT - PROBLEM SELECTOR PLAN 1
-test BG AC/HS  -c/w Lantus 22 units QHS  -Adjust Admelog 24-10-8 w/meals FOR NOW. HOLD IF NOT EATING.   -c/w Admelog moderate correction scale AC and mod HS scale for now  -cons carb diet  -On prednisone 60mg PO daily. Notify endocrine team if this is changed.   - Insulin pen/glucometer review w/RN  Discharge plan:  -Will be determined according to BG/insulin needs/PO intake and steroid therapy at time of discharge.  Send RX for Lantus solostar Pen 22 units QHS and Admelog pen 20-10-8 w/meals  -Pt has new glucometer w/supplies at bedside-needs RN to review glucometer prior to discharge   -Please teach and allow pt to do own finger sticks and insulin injections under RN supervision  Please teach use of insulin pen and Please document teach back  - Home care due to new DM diagnosis and likely need of insulin therapy  - Patient to call doctor with persistent high or low BG at home.   - Recommend routine outpatient ophthalmology, podiatry   - Will need endocrinology f/u if pt send home on insulin/high dose steroids Can follow at [facility]. [address] [place] [address] [address]. Phone .   March 28 11am w/NP and Aug 15th 10:45am w/Dr Clemens

## 2023-03-15 NOTE — PROGRESS NOTE ADULT - PROBLEM SELECTOR PLAN 5
Prednisone-induced hyperglycemia w/ blood glucose elevated to 400-500 on admission. Based on outpatient records, patient has been hyperglycemic in the past, last A1c 4.7; not previously on insulin  - endocrine consulted for hyperglycemia  - on lantus 22 units and admelog 18-4-4  - has been set up for home nursing with emphasis on insulin teaching Cr rising, Urine lytes consistent with pre-renal etiology   - Bladder scan negative for urinary retention   - monitor Cr  - holding losartan; can restart as needed for bp control  - s/p 1 L bolus; c/w maintenance fluids  - re-dosed ppx meds per improved CrCl (now 45)

## 2023-03-15 NOTE — PROGRESS NOTE ADULT - SUBJECTIVE AND OBJECTIVE BOX
Diabetes Follow up note:    Chief complaint: Steroid induced hyperglycemia    Interval Hx: BG values 150-mid 400s over past 24 hours. Pt seen at bedside during lunch. Reports ate one fig marc prior to lunch but doesn't understand how that is affecting his glucose. Pt became angry during interview and not answering my questions.     Review of Systems:  General: as above  GI: Tolerating POs. Denies N/V/D/Abd pain  CV: Denies CP/SOB  ENDO: No S&Sx of hypoglycemia    MEDS:  atorvastatin 10 milliGRAM(s) Oral at bedtime  insulin glargine Injectable (LANTUS) 22 Unit(s) SubCutaneous at bedtime  insulin lispro (ADMELOG) corrective regimen sliding scale   SubCutaneous three times a day before meals  insulin lispro (ADMELOG) corrective regimen sliding scale   SubCutaneous <User Schedule>  insulin lispro Injectable (ADMELOG) 8 Unit(s) SubCutaneous before dinner  insulin lispro Injectable (ADMELOG) 10 Unit(s) SubCutaneous before lunch  predniSONE   Tablet 60 milliGRAM(s) Oral daily    nystatin    Suspension 450755 Unit(s) Oral <User Schedule>  trimethoprim   80 mG/sulfamethoxazole 400 mG 1 Tablet(s) Oral every 24 hours  valGANciclovir 450 milliGRAM(s) Oral daily    Allergies    No Known Allergies        PE:  General: Male sitting in chair. NAD.   Vital Signs Last 24 Hrs  T(C): 36.5 (15 Mar 2023 11:10), Max: 36.5 (15 Mar 2023 11:10)  T(F): 97.7 (15 Mar 2023 11:10), Max: 97.7 (15 Mar 2023 11:10)  HR: 100 (15 Mar 2023 11:10) (97 - 108)  BP: 114/79 (15 Mar 2023 11:10) (114/79 - 134/86)  BP(mean): 91 (15 Mar 2023 11:10) (91 - 91)  RR: 18 (15 Mar 2023 11:10) (18 - 18)  SpO2: 98% (15 Mar 2023 11:10) (98% - 100%)    Parameters below as of 15 Mar 2023 11:10  Patient On (Oxygen Delivery Method): room air      Abd: Soft, NT,ND,   Extremities: Warm  Neuro: A&O X3    LABS:  POCT Blood Glucose.: 457 mg/dL (03-15-23 @ 13:36)  POCT Blood Glucose.: 445 mg/dL (03-15-23 @ 13:34)  POCT Blood Glucose.: 191 mg/dL (03-15-23 @ 09:02)  POCT Blood Glucose.: 158 mg/dL (03-15-23 @ 02:04)  POCT Blood Glucose.: 176 mg/dL (03-14-23 @ 21:23)  POCT Blood Glucose.: 263 mg/dL (03-14-23 @ 17:20)  POCT Blood Glucose.: 352 mg/dL (03-14-23 @ 12:44)  POCT Blood Glucose.: 176 mg/dL (03-14-23 @ 08:59)  POCT Blood Glucose.: 159 mg/dL (03-14-23 @ 01:09)  POCT Blood Glucose.: 197 mg/dL (03-13-23 @ 21:47)  POCT Blood Glucose.: 289 mg/dL (03-13-23 @ 17:16)  POCT Blood Glucose.: 177 mg/dL (03-13-23 @ 12:53)  POCT Blood Glucose.: 146 mg/dL (03-13-23 @ 08:41)  POCT Blood Glucose.: 227 mg/dL (03-13-23 @ 02:35)  POCT Blood Glucose.: 225 mg/dL (03-12-23 @ 21:30)  POCT Blood Glucose.: 195 mg/dL (03-12-23 @ 17:29)                            10.8   18.73 )-----------( 331      ( 15 Mar 2023 11:05 )             35.2       03-15    145  |  109<H>  |  39<H>  ----------------------------<  237<H>  5.3   |  20<L>  |  1.59<H>    eGFR: 46<L>    Ca    7.0<L>      03-15  Mg     1.6     03-15  Phos  1.6     03-15    TPro  5.7<L>  /  Alb  3.5  /  TBili  0.7  /  DBili  x   /  AST  25  /  ALT  26  /  AlkPhos  70  03-15      Thyroid Function Tests:  03-03 @ 15:09 TSH 1.06 FreeT4 -- T3 -- Anti TPO -- Anti Thyroglobulin Ab -- TSI --      A1C with Estimated Average Glucose Result: 4.7 % (02-17-23 @ 11:59)          Contact number: deanne [phone] or [phone]

## 2023-03-15 NOTE — BH CONSULTATION LIAISON ASSESSMENT NOTE - NSBHCONSULTRECOMMENDOTHER_PSY_A_CORE FT
1) pt is confused, likely multifactorial in nature, high WBC from infection (PNA); and elevated blood sugars and pt also on steroids, unclear if this also causing mood lability symptoms. nonetheless, pt is delirious, and per historical data received from resident calling the psych consult, pt was AOA x 3 on admission, was not confused. therefore, may consider low dose standing seroquel 12.5 mg po BID, be sure QTc < 500. Can also give 12.5 mg po BID PRN for acute agitation.     2) pt has various objective data measurements that could be culprit, recent PNA (elevated WBC still), impaired renal functions, elevated BS; therefore would continue to optimize pt's medical issues, and pt's mental status should improve with time    3) pt cannot leave AMA, is confused    4) avoid benzos and anti-cholinergic medications which can worsen confusion 1) pt is confused, likely multifactorial in nature, high WBC from infection (PNA); and elevated blood sugars and pt also on steroids, unclear if this also causing mood lability symptoms. nonetheless, pt is delirious, and per historical data received from resident calling the psych consult, pt was AOA x 3 on admission, was not confused. therefore, may consider low dose standing seroquel 12.5 mg po BID, be sure QTc < 500. Can also give 12.5 mg po BID PRN for acute agitation.     2) pt has various objective data measurements that could be culprit, recent PNA (elevated WBC still), impaired renal functions, elevated BS; therefore would continue to optimize pt's medical issues, and pt's mental status should improve with time    3) avoid benzos and anti-cholinergic medications which can worsen confusion

## 2023-03-15 NOTE — PROGRESS NOTE ADULT - SUBJECTIVE AND OBJECTIVE BOX
INFECTIOUS DISEASES FOLLOW UP-- Sujey Lujan  [phone]    This is a follow up note for this  72yMale with  Pulmonary atelectasis        ROS:  CONSTITUTIONAL:  No fever, good appetite  CARDIOVASCULAR:  No chest pain or palpitations  RESPIRATORY:  No dyspnea  GASTROINTESTINAL:  No nausea, vomiting, diarrhea, or abdominal pain  GENITOURINARY:  No dysuria  NEUROLOGIC:  No headache,     Allergies    No Known Allergies    Intolerances        ANTIBIOTICS/RELEVANT:  antimicrobials  nystatin    Suspension 526188 Unit(s) Oral <User Schedule>  trimethoprim   80 mG/sulfamethoxazole 400 mG 1 Tablet(s) Oral every 24 hours  valGANciclovir 450 milliGRAM(s) Oral daily    immunologic:  tacrolimus 1.5 milliGRAM(s) Oral <User Schedule>    OTHER:  acetaminophen     Tablet .. 650 milliGRAM(s) Oral every 6 hours PRN  albuterol/ipratropium for Nebulization 3 milliLiter(s) Nebulizer every 6 hours  apixaban 5 milliGRAM(s) Oral every 12 hours  aspirin enteric coated 81 milliGRAM(s) Oral daily  atorvastatin 10 milliGRAM(s) Oral at bedtime  dextrose 5%. 1000 milliLiter(s) IV Continuous <Continuous>  dextrose 5%. 1000 milliLiter(s) IV Continuous <Continuous>  dextrose 50% Injectable 25 Gram(s) IV Push once  dextrose 50% Injectable 12.5 Gram(s) IV Push once  dextrose 50% Injectable 25 Gram(s) IV Push once  dextrose Oral Gel 15 Gram(s) Oral once  folic acid 1 milliGRAM(s) Oral daily  glucagon  Injectable 1 milliGRAM(s) IntraMuscular once  guaiFENesin Oral Liquid (Sugar-Free) 200 milliGRAM(s) Oral every 6 hours  insulin glargine Injectable (LANTUS) 22 Unit(s) SubCutaneous at bedtime  insulin lispro (ADMELOG) corrective regimen sliding scale   SubCutaneous <User Schedule>  insulin lispro (ADMELOG) corrective regimen sliding scale   SubCutaneous three times a day before meals  insulin lispro Injectable (ADMELOG) 8 Unit(s) SubCutaneous before dinner  insulin lispro Injectable (ADMELOG) 10 Unit(s) SubCutaneous before lunch  melatonin 3 milliGRAM(s) Oral at bedtime PRN  metoprolol succinate  milliGRAM(s) Oral daily  pantoprazole    Tablet 40 milliGRAM(s) Oral before breakfast  polyethylene glycol 3350 17 Gram(s) Oral daily  potassium phosphate / sodium phosphate Powder (PHOS-NaK) 1 Packet(s) Oral two times a day with meals  predniSONE   Tablet 60 milliGRAM(s) Oral daily  senna 1 Tablet(s) Oral daily  simethicone 80 milliGRAM(s) Chew daily  sodium chloride 3%  Inhalation 4 milliLiter(s) Inhalation every 12 hours      Objective:  Vital Signs Last 24 Hrs  T(C): 36.5 (15 Mar 2023 11:10), Max: 36.5 (15 Mar 2023 11:10)  T(F): 97.7 (15 Mar 2023 11:10), Max: 97.7 (15 Mar 2023 11:10)  HR: 100 (15 Mar 2023 11:10) (97 - 108)  BP: 114/79 (15 Mar 2023 11:10) (114/79 - 134/86)  BP(mean): 91 (15 Mar 2023 11:10) (91 - 91)  RR: 18 (15 Mar 2023 11:10) (18 - 18)  SpO2: 98% (15 Mar 2023 11:10) (98% - 100%)    Parameters below as of 15 Mar 2023 11:10  Patient On (Oxygen Delivery Method): room air        PHYSICAL EXAM:  Constitutional:no acute distress  Eyes:WALT, EOMI  Ear/Nose/Throat: no oral lesions, 	  Respiratory: clear BL  Cardiovascular: S1S2  Gastrointestinal:soft, (+) BS, no tenderness  Extremities:no e/e/c  No Lymphadenopathy  IV sites not inflammed.    LABS:                        10.8   18.73 )-----------( 331      ( 15 Mar 2023 11:05 )             35.2     03-15    145  |  109<H>  |  39<H>  ----------------------------<  237<H>  5.3   |  20<L>  |  1.59<H>    Ca    7.0<L>      15 Mar 2023 11:06  Phos  1.6     03-15  Mg     1.6     03-15    TPro  5.7<L>  /  Alb  3.5  /  TBili  0.7  /  DBili  x   /  AST  25  /  ALT  26  /  AlkPhos  70  03-15          MICROBIOLOGY:            RECENT CULTURES:  03-13 @ 09:06  .Blood Blood-Peripheral  --  --  --    No growth to date.  --  03-12 @ 17:54  .Sputum Sputum  --  --  --    Normal Respiratory Heaven present  --  03-12 @ 17:13  Clean Catch Clean Catch (Midstream)  --  --  --    No growth  --      RADIOLOGY & ADDITIONAL STUDIES:    < from: [facility] Duplex Lower Ext Vein Scan, Sindhu (03.15.23 @ 13:34) >    IMPRESSION:    There is acute below the knee DVT affecting the right soleal and the left   soleal and gastrocnemius veins.    < end of copied text >   INFECTIOUS DISEASES FOLLOW UP-- Sujey Lujan  [phone]    This is a follow up note for this  72yMale with  Pulmonary atelectasis  no new complaints        ROS:  CONSTITUTIONAL:  No fever, good appetite  CARDIOVASCULAR:  No chest pain or palpitations  RESPIRATORY:  No dyspnea  GASTROINTESTINAL:  No nausea, vomiting, diarrhea, or abdominal pain  GENITOURINARY:  No dysuria  NEUROLOGIC:  No headache,     Allergies    No Known Allergies    Intolerances        ANTIBIOTICS/RELEVANT:  antimicrobials  nystatin    Suspension 056755 Unit(s) Oral <User Schedule>  trimethoprim   80 mG/sulfamethoxazole 400 mG 1 Tablet(s) Oral every 24 hours  valGANciclovir 450 milliGRAM(s) Oral daily    immunologic:  tacrolimus 1.5 milliGRAM(s) Oral <User Schedule>    OTHER:  acetaminophen     Tablet .. 650 milliGRAM(s) Oral every 6 hours PRN  albuterol/ipratropium for Nebulization 3 milliLiter(s) Nebulizer every 6 hours  apixaban 5 milliGRAM(s) Oral every 12 hours  aspirin enteric coated 81 milliGRAM(s) Oral daily  atorvastatin 10 milliGRAM(s) Oral at bedtime  dextrose 5%. 1000 milliLiter(s) IV Continuous <Continuous>  dextrose 5%. 1000 milliLiter(s) IV Continuous <Continuous>  dextrose 50% Injectable 25 Gram(s) IV Push once  dextrose 50% Injectable 12.5 Gram(s) IV Push once  dextrose 50% Injectable 25 Gram(s) IV Push once  dextrose Oral Gel 15 Gram(s) Oral once  folic acid 1 milliGRAM(s) Oral daily  glucagon  Injectable 1 milliGRAM(s) IntraMuscular once  guaiFENesin Oral Liquid (Sugar-Free) 200 milliGRAM(s) Oral every 6 hours  insulin glargine Injectable (LANTUS) 22 Unit(s) SubCutaneous at bedtime  insulin lispro (ADMELOG) corrective regimen sliding scale   SubCutaneous <User Schedule>  insulin lispro (ADMELOG) corrective regimen sliding scale   SubCutaneous three times a day before meals  insulin lispro Injectable (ADMELOG) 8 Unit(s) SubCutaneous before dinner  insulin lispro Injectable (ADMELOG) 10 Unit(s) SubCutaneous before lunch  melatonin 3 milliGRAM(s) Oral at bedtime PRN  metoprolol succinate  milliGRAM(s) Oral daily  pantoprazole    Tablet 40 milliGRAM(s) Oral before breakfast  polyethylene glycol 3350 17 Gram(s) Oral daily  potassium phosphate / sodium phosphate Powder (PHOS-NaK) 1 Packet(s) Oral two times a day with meals  predniSONE   Tablet 60 milliGRAM(s) Oral daily  senna 1 Tablet(s) Oral daily  simethicone 80 milliGRAM(s) Chew daily  sodium chloride 3%  Inhalation 4 milliLiter(s) Inhalation every 12 hours      Objective:  Vital Signs Last 24 Hrs  T(C): 36.5 (15 Mar 2023 11:10), Max: 36.5 (15 Mar 2023 11:10)  T(F): 97.7 (15 Mar 2023 11:10), Max: 97.7 (15 Mar 2023 11:10)  HR: 100 (15 Mar 2023 11:10) (97 - 108)  BP: 114/79 (15 Mar 2023 11:10) (114/79 - 134/86)  BP(mean): 91 (15 Mar 2023 11:10) (91 - 91)  RR: 18 (15 Mar 2023 11:10) (18 - 18)  SpO2: 98% (15 Mar 2023 11:10) (98% - 100%)    Parameters below as of 15 Mar 2023 11:10  Patient On (Oxygen Delivery Method): room air        PHYSICAL EXAM:  Constitutional:no acute distress  Eyes:WALT, EOMI  Ear/Nose/Throat: no oral lesions, 	  Respiratory: clear BL  Cardiovascular: S1S2  Gastrointestinal:soft, (+) BS, no tenderness  Extremities:no e/e/c no edema noted  No Lymphadenopathy  IV sites not inflammed.    LABS:                        10.8   18.73 )-----------( 331      ( 15 Mar 2023 11:05 )             35.2     03-15    145  |  109<H>  |  39<H>  ----------------------------<  237<H>  5.3   |  20<L>  |  1.59<H>    Ca    7.0<L>      15 Mar 2023 11:06  Phos  1.6     03-15  Mg     1.6     03-15    TPro  5.7<L>  /  Alb  3.5  /  TBili  0.7  /  DBili  x   /  AST  25  /  ALT  26  /  AlkPhos  70  03-15          MICROBIOLOGY:            RECENT CULTURES:  03-13 @ 09:06  .Blood Blood-Peripheral  --  --  --    No growth to date.  --  03-12 @ 17:54  .Sputum Sputum  --  --  --    Normal Respiratory Heaven present  --  03-12 @ 17:13  Clean Catch Clean Catch (Midstream)  --  --  --    No growth  --      RADIOLOGY & ADDITIONAL STUDIES:    < from: [facility] Duplex Lower Ext Vein Scan, Sindhu (03.15.23 @ 13:34) >    IMPRESSION:    There is acute below the knee DVT affecting the right soleal and the left   soleal and gastrocnemius veins.    < end of copied text >

## 2023-03-15 NOTE — BH CONSULTATION LIAISON ASSESSMENT NOTE - SUMMARY
Pt is a 71 y/o black male with hx of nonischemic cardiomyopathy s/p HM2 LVAD, underwent OHT 2/23/18 with hepatitis C donor, hx of hemolytic anemia presented w/ 1d chest tightness, found to have new onset aflutter w/ RVR, and found to have human metapneumovirus PNA presents with mild confusion. psych called for confusion. pt seen, states it's "2003," and appeared irritable and admitted to feeling confused. pt poor historian, answered most of the interview questions as "Ask my Doctor, I don't know anything." Pt denies si/hi, admits to fragmented sleep and fair appetite. pt denies substance abuse history, and denies past psych history. upon further questioning, pt stared out the window.   Pt was confused, with high WBC and elevated blood sugars.

## 2023-03-15 NOTE — PROGRESS NOTE ADULT - ASSESSMENT
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 He is now back in sinus tachycardia. Course c/b by URI w/ human metapneumovirus. Would assess for superimposed infection given immunosuppression. Afib/flutter would be concerning for rejection, but repeat echo here with normal LV function. Also noted to have acute on chronic kidney injury likely 2/2 hypovolemia. Now with worsening leukocytosis with CT chest c/f PNA.     Cardiac studies:  TTE 3/3/23: LVIDd 3.6, EF 50-55%, concentric remodeling     Relevant Labs:  Tacrolimus 9.9 on 3/14

## 2023-03-15 NOTE — BH CONSULTATION LIAISON ASSESSMENT NOTE - CURRENT MEDICATION
MEDICATIONS  (STANDING):  albuterol/ipratropium for Nebulization 3 milliLiter(s) Nebulizer every 6 hours  apixaban 5 milliGRAM(s) Oral every 12 hours  aspirin enteric coated 81 milliGRAM(s) Oral daily  atorvastatin 10 milliGRAM(s) Oral at bedtime  dextrose 5%. 1000 milliLiter(s) (50 mL/Hr) IV Continuous <Continuous>  dextrose 5%. 1000 milliLiter(s) (100 mL/Hr) IV Continuous <Continuous>  dextrose 50% Injectable 25 Gram(s) IV Push once  dextrose 50% Injectable 12.5 Gram(s) IV Push once  dextrose 50% Injectable 25 Gram(s) IV Push once  dextrose Oral Gel 15 Gram(s) Oral once  folic acid 1 milliGRAM(s) Oral daily  glucagon  Injectable 1 milliGRAM(s) IntraMuscular once  guaiFENesin Oral Liquid (Sugar-Free) 200 milliGRAM(s) Oral every 6 hours  insulin glargine Injectable (LANTUS) 22 Unit(s) SubCutaneous at bedtime  insulin lispro (ADMELOG) corrective regimen sliding scale   SubCutaneous <User Schedule>  insulin lispro (ADMELOG) corrective regimen sliding scale   SubCutaneous three times a day before meals  insulin lispro Injectable (ADMELOG) 8 Unit(s) SubCutaneous before dinner  insulin lispro Injectable (ADMELOG) 10 Unit(s) SubCutaneous before lunch  metoprolol succinate  milliGRAM(s) Oral daily  nystatin    Suspension 532022 Unit(s) Oral <User Schedule>  pantoprazole    Tablet 40 milliGRAM(s) Oral before breakfast  polyethylene glycol 3350 17 Gram(s) Oral daily  potassium phosphate / sodium phosphate Powder (PHOS-NaK) 1 Packet(s) Oral two times a day with meals  predniSONE   Tablet 60 milliGRAM(s) Oral daily  senna 1 Tablet(s) Oral daily  simethicone 80 milliGRAM(s) Chew daily  sodium chloride 3%  Inhalation 4 milliLiter(s) Inhalation every 12 hours  tacrolimus 1.5 milliGRAM(s) Oral <User Schedule>  trimethoprim   80 mG/sulfamethoxazole 400 mG 1 Tablet(s) Oral every 24 hours  valGANciclovir 450 milliGRAM(s) Oral daily    MEDICATIONS  (PRN):  acetaminophen     Tablet .. 650 milliGRAM(s) Oral every 6 hours PRN Temp greater or equal to 38C (100.4F), Mild Pain (1 - 3)  melatonin 3 milliGRAM(s) Oral at bedtime PRN Insomnia

## 2023-03-15 NOTE — PROGRESS NOTE ADULT - PROBLEM SELECTOR PLAN 4
Patient AOx1 on admission, AOx3 by time of interview though slow to respond to questions, falling asleep intermittently during exam. Considering infection-mediated vs 2/2 hyperglycemia.  RESOLVED  - CTH negative for acute pathology  - glycemic control as below; monitor for signs of DKA Prednisone-induced hyperglycemia w/ blood glucose elevated to 400-500 on admission. Based on outpatient records, patient has been hyperglycemic in the past, last A1c 4.7; not previously on insulin  - endocrine consulted for hyperglycemia  - on lantus 22 units and admelog 18-4-4  - has been set up for home nursing with emphasis on insulin teaching

## 2023-03-15 NOTE — BH CONSULTATION LIAISON ASSESSMENT NOTE - NSBHCHARTREVIEWVS_PSY_A_CORE FT
Vital Signs Last 24 Hrs  T(C): 36.5 (15 Mar 2023 11:10), Max: 36.5 (15 Mar 2023 11:10)  T(F): 97.7 (15 Mar 2023 11:10), Max: 97.7 (15 Mar 2023 11:10)  HR: 100 (15 Mar 2023 11:10) (97 - 108)  BP: 114/79 (15 Mar 2023 11:10) (114/79 - 134/86)  BP(mean): 91 (15 Mar 2023 11:10) (91 - 91)  RR: 18 (15 Mar 2023 11:10) (18 - 18)  SpO2: 98% (15 Mar 2023 11:10) (98% - 100%)    Parameters below as of 15 Mar 2023 11:10  Patient On (Oxygen Delivery Method): room air

## 2023-03-15 NOTE — PROGRESS NOTE ADULT - PROBLEM SELECTOR PLAN 1
P/w chest tightness x1d associated w/ dry cough; likely multifactorial in s/o atrial flutter w/ HR 140s, notes that his chest tightness is improving as HR is lowered. Also considering contribution from CT chest findings of impacted R middle and lower lobe bronchi w/ near collapse of R middle/lower lobes.   - RVP positive for human metapneumovirus   - Procal elevated to 2.39; neutrophilic predominance on diff with 1% bands in immunosuppresed   - continue abx: cefepime 1g q24h (3/4 - 2/8 ) --> adjusted for NORMAN  -EBV negative for active infection, MRSA negative  - fungitell > 300   - repeat BCx NGTD  - treatment of aflutter as below    -f/u repeat CT Chest/A/P for possible infectious etiology in setting of leukocytosis  - Transplant ID following; f/u recs  - c/w ppx of bactrim and valcyclovir (redosed given improved Cr Cl) P/w chest tightness x1d associated w/ dry cough; likely multifactorial in s/o atrial flutter w/ HR 140s, notes that his chest tightness is improving as HR is lowered. Also considering contribution from CT chest findings of impacted R middle and lower lobe bronchi w/ near collapse of R middle/lower lobes.   - RVP positive for human metapneumovirus   - Procal elevated to 2.39; neutrophilic predominance on diff with 1% bands in immunosuppresed   - s/p abx: cefepime 1g q24h (3/4 - 2/8 ) --> adjusted for NORMAN  -EBV negative for active infection, MRSA negative  - fungitell > 300   - repeat BCx NGTD  - treatment of aflutter as below  -CT C/A/P negative  - Transplant ID following; f/u recs  - c/w ppx of bactrim and valcyclovir (redosed given improved Cr Cl)

## 2023-03-15 NOTE — PROGRESS NOTE ADULT - ATTENDING COMMENTS
above plans discussed with Dr. Birmingham    # hMPV PNA  # leukocytosis  # NORMAN  # Aflutter with RVR  # AIHA on high dose steroids  # steroid induced hyperglycemia  # s/p heart transplant  # hx of C.diff colitis    - worsening leukocytosis past few days even thought pt remains hemodynamically stable without any localizing symptoms  - LE doppler (+) with bilateral below knee DVTs  - DVTs could explain his leukocytosis/elevated lactate damien given lack of localizing symptoms and negative CT findings  - prednisone dose reduced to 60mg daily  - FS now better controlled on current regimen  - obtain tacro level  - appreciate transplant cards: low suspicion for rejection; no indication for EMBx at this time; care discussed with Radha  - appreciate interdisciplinary team recommendations from transplant cards, ID, endo    * pt with poor understanding of his diseases and management. Will discuss with outpatient coordinator for safe dc planning    Brittany Trevizo MD  Division of Hospital Medicine  Contact via [employer] Teams  Office: [phone]

## 2023-03-15 NOTE — PROGRESS NOTE ADULT - PROBLEM SELECTOR PLAN 2
- pulm consult for bronch but not indicated per them  - continue home bactrim, valganciclovir, antifungal ppx  - Tacrolimus 9.9 on 3/14, goal 6-8, will f/u repeat level today (obtain tacro level 30min prior to administration in AM)  - Continue Tacrolimus 1.5mg BID for now  - s/u transplant ID, WBC rising possibly 2/2 steroids (no overt infection on CT cap)

## 2023-03-15 NOTE — PROGRESS NOTE ADULT - NSPROGADDITIONALINFOA_GEN_ALL_CORE
-Plan discussed with pt/team.  Contact info: [phone] (24/7). pager [phone]  Amy on [place]  Teams on M-T-W-F. Unavailable Thu/Weekends/Holidays  Spent over 25 minutes providing face to face education as well as assessing  pt/labs/meds and discussing plan with primary team  Adjusting insulin  Discharge plan  Follow up care
26 minutes spent on the development of plan of care/coordination of care/glycemic control through review of labs, blood glucose values and vital signs.
-Plan discussed with pt/team.  Contact info: [phone] (24/7). pager [phone]  Amy on [place]  Teams on M-T-W-F. Unavailable Thu/Weekends/Holidays  Spent 28 minutes assessing pt/labs/meds and discussing plan of care with primary team  Adjusting insulin  Discharge plan  Follow up care

## 2023-03-15 NOTE — PROGRESS NOTE ADULT - SUBJECTIVE AND OBJECTIVE BOX
Patient seen and examined at bedside.    Overnight Events:   Patient no willing to speak to team.     REVIEW OF SYSTEMS:  Unable to obtain as patient is not speaking with team.           Current Meds:  acetaminophen     Tablet .. 650 milliGRAM(s) Oral every 6 hours PRN  albuterol/ipratropium for Nebulization 3 milliLiter(s) Nebulizer every 6 hours  apixaban 5 milliGRAM(s) Oral every 12 hours  aspirin enteric coated 81 milliGRAM(s) Oral daily  atorvastatin 10 milliGRAM(s) Oral at bedtime  dextrose 5%. 1000 milliLiter(s) IV Continuous <Continuous>  dextrose 5%. 1000 milliLiter(s) IV Continuous <Continuous>  dextrose 50% Injectable 25 Gram(s) IV Push once  dextrose 50% Injectable 12.5 Gram(s) IV Push once  dextrose 50% Injectable 25 Gram(s) IV Push once  dextrose Oral Gel 15 Gram(s) Oral once  folic acid 1 milliGRAM(s) Oral daily  glucagon  Injectable 1 milliGRAM(s) IntraMuscular once  guaiFENesin Oral Liquid (Sugar-Free) 200 milliGRAM(s) Oral every 6 hours  insulin glargine Injectable (LANTUS) 22 Unit(s) SubCutaneous at bedtime  insulin lispro (ADMELOG) corrective regimen sliding scale   SubCutaneous <User Schedule>  insulin lispro (ADMELOG) corrective regimen sliding scale   SubCutaneous three times a day before meals  insulin lispro Injectable (ADMELOG) 20 Unit(s) SubCutaneous before breakfast  insulin lispro Injectable (ADMELOG) 8 Unit(s) SubCutaneous before dinner  insulin lispro Injectable (ADMELOG) 10 Unit(s) SubCutaneous before lunch  melatonin 3 milliGRAM(s) Oral at bedtime PRN  metoprolol succinate  milliGRAM(s) Oral daily  nystatin    Suspension 092982 Unit(s) Oral <User Schedule>  pantoprazole    Tablet 40 milliGRAM(s) Oral before breakfast  polyethylene glycol 3350 17 Gram(s) Oral daily  potassium phosphate / sodium phosphate Powder (PHOS-NaK) 1 Packet(s) Oral two times a day with meals  predniSONE   Tablet 60 milliGRAM(s) Oral daily  senna 1 Tablet(s) Oral daily  simethicone 80 milliGRAM(s) Chew daily  sodium chloride 3%  Inhalation 4 milliLiter(s) Inhalation every 12 hours  tacrolimus 1.5 milliGRAM(s) Oral <User Schedule>  trimethoprim   80 mG/sulfamethoxazole 400 mG 1 Tablet(s) Oral every 24 hours  valGANciclovir 450 milliGRAM(s) Oral daily      Vitals:  T(F): 97.7 (03-15), Max: 97.7 (03-15)  HR: 100 (03-15) (97 - 108)  BP: 114/79 (03-15) (114/79 - 134/86)  RR: 18 (03-15)  SpO2: 98% (03-15)  I&O's Summary    14 Mar 2023 07:01  -  15 Mar 2023 07:00  --------------------------------------------------------  IN: 590 mL / OUT: 500 mL / NET: 90 mL      PHYSICAL EXAM:  Appearance: No acute distress; well appearing  Eyes: no scleral icterus   HEENT: Normal oral mucosa  Cardiovascular: RRR, S1, S2, no murmurs, rubs, or gallops; no edema  Respiratory: No respiratory distress, no auditory stridor    Gastrointestinal: Nondistended, appears soft  Musculoskeletal: No clubbing; no joint deformity   Neurologic: Moving all 4 extremities spontaneously  Psychiatry: Not willing to answer any questions  Skin: No rashes, ecchymoses, or cyanosis                          10.8   18.73 )-----------( 331      ( 15 Mar 2023 11:05 )             35.2     03-15    145  |  109<H>  |  39<H>  ----------------------------<  237<H>  5.3   |  20<L>  |  1.59<H>    Ca    7.0<L>      15 Mar 2023 11:06  Phos  1.6     03-15  Mg     1.6     03-15    TPro  5.7<L>  /  Alb  3.5  /  TBili  0.7  /  DBili  x   /  AST  25  /  ALT  26  /  AlkPhos  70  03-15      Tacrolimus 9.9 on 3/14

## 2023-03-16 DIAGNOSIS — E83.39 OTHER DISORDERS OF PHOSPHORUS METABOLISM: ICD-10-CM

## 2023-03-16 DIAGNOSIS — D72.829 ELEVATED WHITE BLOOD CELL COUNT, UNSPECIFIED: ICD-10-CM

## 2023-03-16 LAB
ALBUMIN SERPL ELPH-MCNC: 3.2 G/DL — LOW (ref 3.3–5)
ALP SERPL-CCNC: 81 U/L — SIGNIFICANT CHANGE UP (ref 40–120)
ALT FLD-CCNC: 25 U/L — SIGNIFICANT CHANGE UP (ref 10–45)
ANION GAP SERPL CALC-SCNC: 12 MMOL/L — SIGNIFICANT CHANGE UP (ref 5–17)
AST SERPL-CCNC: 25 U/L — SIGNIFICANT CHANGE UP (ref 10–40)
BASE EXCESS BLDV CALC-SCNC: -3.3 MMOL/L — LOW (ref -2–3)
BASOPHILS # BLD AUTO: 0.06 K/UL — SIGNIFICANT CHANGE UP (ref 0–0.2)
BASOPHILS NFR BLD AUTO: 0.3 % — SIGNIFICANT CHANGE UP (ref 0–2)
BILIRUB SERPL-MCNC: 0.5 MG/DL — SIGNIFICANT CHANGE UP (ref 0.2–1.2)
BUN SERPL-MCNC: 39 MG/DL — HIGH (ref 7–23)
CA-I SERPL-SCNC: 1 MMOL/L — LOW (ref 1.15–1.33)
CALCIUM SERPL-MCNC: 6.7 MG/DL — LOW (ref 8.4–10.5)
CHLORIDE BLDV-SCNC: 108 MMOL/L — SIGNIFICANT CHANGE UP (ref 96–108)
CHLORIDE SERPL-SCNC: 107 MMOL/L — SIGNIFICANT CHANGE UP (ref 96–108)
CO2 BLDV-SCNC: 26 MMOL/L — SIGNIFICANT CHANGE UP (ref 22–26)
CO2 SERPL-SCNC: 21 MMOL/L — LOW (ref 22–31)
CREAT SERPL-MCNC: 1.56 MG/DL — HIGH (ref 0.5–1.3)
EGFR: 47 ML/MIN/1.73M2 — LOW
EOSINOPHIL # BLD AUTO: 0.01 K/UL — SIGNIFICANT CHANGE UP (ref 0–0.5)
EOSINOPHIL NFR BLD AUTO: 0.1 % — SIGNIFICANT CHANGE UP (ref 0–6)
GAS PNL BLDV: 139 MMOL/L — SIGNIFICANT CHANGE UP (ref 136–145)
GAS PNL BLDV: SIGNIFICANT CHANGE UP
GAS PNL BLDV: SIGNIFICANT CHANGE UP
GLUCOSE BLDC GLUCOMTR-MCNC: 107 MG/DL — HIGH (ref 70–99)
GLUCOSE BLDC GLUCOMTR-MCNC: 160 MG/DL — HIGH (ref 70–99)
GLUCOSE BLDC GLUCOMTR-MCNC: 183 MG/DL — HIGH (ref 70–99)
GLUCOSE BLDC GLUCOMTR-MCNC: 248 MG/DL — HIGH (ref 70–99)
GLUCOSE BLDC GLUCOMTR-MCNC: 255 MG/DL — HIGH (ref 70–99)
GLUCOSE BLDV-MCNC: 178 MG/DL — HIGH (ref 70–99)
GLUCOSE SERPL-MCNC: 194 MG/DL — HIGH (ref 70–99)
HCO3 BLDV-SCNC: 24 MMOL/L — SIGNIFICANT CHANGE UP (ref 22–29)
HCT VFR BLD CALC: 34.5 % — LOW (ref 39–50)
HCT VFR BLDA CALC: 33 % — LOW (ref 39–51)
HGB BLD CALC-MCNC: 10.9 G/DL — LOW (ref 12.6–17.4)
HGB BLD-MCNC: 10.6 G/DL — LOW (ref 13–17)
IMM GRANULOCYTES NFR BLD AUTO: 5.9 % — HIGH (ref 0–0.9)
LACTATE BLDV-MCNC: 2.9 MMOL/L — HIGH (ref 0.5–2)
LYMPHOCYTES # BLD AUTO: 1.44 K/UL — SIGNIFICANT CHANGE UP (ref 1–3.3)
LYMPHOCYTES # BLD AUTO: 8.2 % — LOW (ref 13–44)
MAGNESIUM SERPL-MCNC: 1.6 MG/DL — SIGNIFICANT CHANGE UP (ref 1.6–2.6)
MCHC RBC-ENTMCNC: 30.7 GM/DL — LOW (ref 32–36)
MCHC RBC-ENTMCNC: 33.3 PG — SIGNIFICANT CHANGE UP (ref 27–34)
MCV RBC AUTO: 108.5 FL — HIGH (ref 80–100)
MONOCYTES # BLD AUTO: 1.1 K/UL — HIGH (ref 0–0.9)
MONOCYTES NFR BLD AUTO: 6.3 % — SIGNIFICANT CHANGE UP (ref 2–14)
NEUTROPHILS # BLD AUTO: 13.94 K/UL — HIGH (ref 1.8–7.4)
NEUTROPHILS NFR BLD AUTO: 79.2 % — HIGH (ref 43–77)
NRBC # BLD: 1 /100 WBCS — HIGH (ref 0–0)
PCO2 BLDV: 54 MMHG — SIGNIFICANT CHANGE UP (ref 42–55)
PH BLDV: 7.26 — LOW (ref 7.32–7.43)
PHOSPHATE SERPL-MCNC: 1.8 MG/DL — LOW (ref 2.5–4.5)
PLATELET # BLD AUTO: 303 K/UL — SIGNIFICANT CHANGE UP (ref 150–400)
PO2 BLDV: 39 MMHG — SIGNIFICANT CHANGE UP (ref 25–45)
POTASSIUM BLDV-SCNC: 4.1 MMOL/L — SIGNIFICANT CHANGE UP (ref 3.5–5.1)
POTASSIUM SERPL-MCNC: 4.3 MMOL/L — SIGNIFICANT CHANGE UP (ref 3.5–5.3)
POTASSIUM SERPL-SCNC: 4.3 MMOL/L — SIGNIFICANT CHANGE UP (ref 3.5–5.3)
PROT SERPL-MCNC: 5.5 G/DL — LOW (ref 6–8.3)
RBC # BLD: 3.18 M/UL — LOW (ref 4.2–5.8)
RBC # FLD: 17.1 % — HIGH (ref 10.3–14.5)
SAO2 % BLDV: 62.3 % — LOW (ref 67–88)
SODIUM SERPL-SCNC: 140 MMOL/L — SIGNIFICANT CHANGE UP (ref 135–145)
TACROLIMUS SERPL-MCNC: 2.5 NG/ML — SIGNIFICANT CHANGE UP
WBC # BLD: 17.59 K/UL — HIGH (ref 3.8–10.5)
WBC # FLD AUTO: 17.59 K/UL — HIGH (ref 3.8–10.5)

## 2023-03-16 PROCEDURE — 99232 SBSQ HOSP IP/OBS MODERATE 35: CPT

## 2023-03-16 PROCEDURE — 93010 ELECTROCARDIOGRAM REPORT: CPT

## 2023-03-16 PROCEDURE — 99233 SBSQ HOSP IP/OBS HIGH 50: CPT | Mod: GC

## 2023-03-16 RX ORDER — CALCIUM CARBONATE 500(1250)
1 TABLET ORAL ONCE
Refills: 0 | Status: COMPLETED | OUTPATIENT
Start: 2023-03-16 | End: 2023-03-16

## 2023-03-16 RX ORDER — INSULIN LISPRO 100/ML
26 VIAL (ML) SUBCUTANEOUS
Refills: 0 | Status: DISCONTINUED | OUTPATIENT
Start: 2023-03-17 | End: 2023-03-18

## 2023-03-16 RX ORDER — SIMETHICONE 125 MG
125 CAPSULE ORAL DAILY
Qty: 30 | Refills: 0 | Status: DISCONTINUED | COMMUNITY
Start: 2023-03-10 | End: 2023-03-16

## 2023-03-16 RX ORDER — SODIUM,POTASSIUM PHOSPHATES 278-250MG
1 POWDER IN PACKET (EA) ORAL
Refills: 0 | Status: COMPLETED | OUTPATIENT
Start: 2023-03-16 | End: 2023-03-16

## 2023-03-16 RX ORDER — INSULIN GLARGINE 100 [IU]/ML
22 INJECTION, SOLUTION SUBCUTANEOUS AT BEDTIME
Refills: 0 | Status: DISCONTINUED | OUTPATIENT
Start: 2023-03-16 | End: 2023-03-17

## 2023-03-16 RX ORDER — LOSARTAN POTASSIUM 100 MG/1
25 TABLET, FILM COATED ORAL DAILY
Refills: 0 | Status: DISCONTINUED | OUTPATIENT
Start: 2023-03-16 | End: 2023-03-18

## 2023-03-16 RX ORDER — TACROLIMUS 5 MG/1
2.5 CAPSULE ORAL
Refills: 0 | Status: DISCONTINUED | OUTPATIENT
Start: 2023-03-16 | End: 2023-03-18

## 2023-03-16 RX ORDER — SODIUM CHLORIDE 9 MG/ML
500 INJECTION, SOLUTION INTRAVENOUS ONCE
Refills: 0 | Status: COMPLETED | OUTPATIENT
Start: 2023-03-16 | End: 2023-03-16

## 2023-03-16 RX ADMIN — Medication 200 MILLIGRAM(S): at 05:26

## 2023-03-16 RX ADMIN — TACROLIMUS 1.5 MILLIGRAM(S): 5 CAPSULE ORAL at 09:06

## 2023-03-16 RX ADMIN — VALGANCICLOVIR 450 MILLIGRAM(S): 450 TABLET, FILM COATED ORAL at 11:26

## 2023-03-16 RX ADMIN — Medication 1 TABLET(S): at 11:25

## 2023-03-16 RX ADMIN — Medication 1 PACKET(S): at 18:03

## 2023-03-16 RX ADMIN — Medication 81 MILLIGRAM(S): at 11:28

## 2023-03-16 RX ADMIN — Medication 24 UNIT(S): at 09:18

## 2023-03-16 RX ADMIN — Medication 1 PACKET(S): at 10:45

## 2023-03-16 RX ADMIN — POLYETHYLENE GLYCOL 3350 17 GRAM(S): 17 POWDER, FOR SOLUTION ORAL at 11:31

## 2023-03-16 RX ADMIN — Medication 1 PACKET(S): at 09:03

## 2023-03-16 RX ADMIN — ATORVASTATIN CALCIUM 10 MILLIGRAM(S): 80 TABLET, FILM COATED ORAL at 22:01

## 2023-03-16 RX ADMIN — Medication 1 TABLET(S): at 11:27

## 2023-03-16 RX ADMIN — Medication 200 MILLIGRAM(S): at 11:27

## 2023-03-16 RX ADMIN — Medication 60 MILLIGRAM(S): at 05:27

## 2023-03-16 RX ADMIN — Medication 1 MILLIGRAM(S): at 11:28

## 2023-03-16 RX ADMIN — Medication 1 PACKET(S): at 11:25

## 2023-03-16 RX ADMIN — Medication 6: at 13:30

## 2023-03-16 RX ADMIN — INSULIN GLARGINE 22 UNIT(S): 100 INJECTION, SOLUTION SUBCUTANEOUS at 22:01

## 2023-03-16 RX ADMIN — SIMETHICONE 80 MILLIGRAM(S): 80 TABLET, CHEWABLE ORAL at 11:28

## 2023-03-16 RX ADMIN — Medication 500000 UNIT(S): at 11:27

## 2023-03-16 RX ADMIN — Medication 8 UNIT(S): at 17:58

## 2023-03-16 RX ADMIN — APIXABAN 5 MILLIGRAM(S): 2.5 TABLET, FILM COATED ORAL at 17:57

## 2023-03-16 RX ADMIN — TACROLIMUS 2.5 MILLIGRAM(S): 5 CAPSULE ORAL at 22:00

## 2023-03-16 RX ADMIN — Medication 2: at 17:58

## 2023-03-16 RX ADMIN — LOSARTAN POTASSIUM 25 MILLIGRAM(S): 100 TABLET, FILM COATED ORAL at 22:01

## 2023-03-16 RX ADMIN — Medication 500000 UNIT(S): at 09:03

## 2023-03-16 RX ADMIN — Medication 1 PACKET(S): at 13:29

## 2023-03-16 RX ADMIN — PANTOPRAZOLE SODIUM 40 MILLIGRAM(S): 20 TABLET, DELAYED RELEASE ORAL at 05:26

## 2023-03-16 RX ADMIN — Medication 500000 UNIT(S): at 22:01

## 2023-03-16 RX ADMIN — SENNA PLUS 1 TABLET(S): 8.6 TABLET ORAL at 11:31

## 2023-03-16 RX ADMIN — Medication 10 UNIT(S): at 13:28

## 2023-03-16 RX ADMIN — APIXABAN 5 MILLIGRAM(S): 2.5 TABLET, FILM COATED ORAL at 05:26

## 2023-03-16 RX ADMIN — Medication 500000 UNIT(S): at 17:57

## 2023-03-16 RX ADMIN — Medication 200 MILLIGRAM(S): at 17:57

## 2023-03-16 RX ADMIN — Medication 2: at 09:18

## 2023-03-16 NOTE — PROGRESS NOTE ADULT - PROBLEM SELECTOR PLAN 1
P/w chest tightness x1d associated w/ dry cough; likely multifactorial in s/o atrial flutter w/ HR 140s, notes that his chest tightness is improving as HR is lowered. Also considering contribution from CT chest findings of impacted R middle and lower lobe bronchi w/ near collapse of R middle/lower lobes.   - RVP positive for human metapneumovirus   - Procal elevated to 2.39; neutrophilic predominance on diff with 1% bands in immunosuppresed   - s/p abx: cefepime 1g q24h (3/4 - 2/8 ) --> adjusted for NORMAN  -EBV negative for active infection, MRSA negative  - fungitell > 300   - repeat BCx NGTD  - treatment of aflutter as below  -CT C/A/P negative  - Transplant ID following; f/u recs  - c/w ppx of bactrim and valcyclovir (redosed given improved Cr Cl) Patient with persistent leukocytosis found to have bilateral below the knee DVTs on duplex imaging    Plan:   >IVF bolus to clear mildly elevated lactate (likely secondary to duonebs/DVT)  >ctm WBC on CBC

## 2023-03-16 NOTE — PROGRESS NOTE ADULT - PROBLEM SELECTOR PLAN 4
Prednisone-induced hyperglycemia w/ blood glucose elevated to 400-500 on admission. Based on outpatient records, patient has been hyperglycemic in the past, last A1c 4.7; not previously on insulin  - endocrine consulted for hyperglycemia  - on lantus 22 units and admelog 18-4-4  - has been set up for home nursing with emphasis on insulin teaching Meeting sepsis criteria w/ tachycardia to 140s, RR 24, oral temp 100.1 --> likely higher rectal temp, w/ PNA as possible source given symptoms, CT chest findings.   - treatment of PNA as above: vanc and cefepime  - f/u transplant ID recs  - Steroids decreased to 60 mg per ID and Heme Onc reccomendation  - Prelim BCX positive for gram positive cocci in pairs, f/u final cultures  - fungitell > 300; unlikely PJP given on ppx and on room air

## 2023-03-16 NOTE — PROGRESS NOTE ADULT - ATTENDING COMMENTS
above plans discussed with Dr. Birmingham    # hMPV PNA  # leukocytosis  # NORMAN  # Aflutter with RVR  # AIHA on high dose steroids  # steroid induced hyperglycemia  # s/p heart transplant  # hx of C.diff colitis    - worsening leukocytosis past few days even thought pt remains hemodynamically stable without any localizing symptoms  - LE doppler (+) with bilateral below knee DVTs  - DVTs could explain his leukocytosis/elevated lactate dmaien given lack of localizing symptoms and negative CT findings  - prednisone dose reduced to 60mg daily  - FS now better controlled on current regimen  - obtain tacro level  - appreciate ID recs: care discussed with Dr. Lujan - low suspicion for infectious etiologies  - appreciate transplant cards: low suspicion for rejection; no indication for EMBx at this time; care discussed with Radha  - appreciate interdisciplinary team recommendations from transplant cards, ID, endo    * dc planning    Brittany Trevizo MD  Division of Hospital Medicine  Contact via [employer] Teams  Office: [phone]

## 2023-03-16 NOTE — PROGRESS NOTE ADULT - ATTENDING COMMENTS
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 C donor (treated) 2/23/18 (post op course complicated by graft dysfunction treated by plasmapheresis, IVIG, and rituximab), presenting w/ chest tightness and noted to have hMVP. 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 c/w prednisone 60mg, will discharge him on this dose (hematology team discussed with Dr. Rosario).  Will follow

## 2023-03-16 NOTE — PROGRESS NOTE ADULT - PROBLEM SELECTOR PLAN 6
s/p OHT in 2/23/18  - continue home Toprol  mg PO QD  - continue home ASA 81 and  pravastatin 20 mg PO QD  - continue home pantoprazole 40 mg PO QD    - HOLD losartan 100mg qd due to NORMAN, dilt gtt if HR >120s  - underwent annual RHC/EMBx with Dr. Carpenter in February 2023 Cr rising, Urine lytes consistent with pre-renal etiology   - Bladder scan negative for urinary retention   - monitor Cr  - holding losartan; can restart as needed for bp control  - s/p 1 L bolus; c/w maintenance fluids  - re-dosed ppx meds per improved CrCl (now 45)

## 2023-03-16 NOTE — PROGRESS NOTE ADULT - PROBLEM SELECTOR PLAN 2
Admitted w/ HR 140s, improved after giving home dose 200mg metoprolol succinate. EP consulted and determined that patient has new-onset A-flutter. TTE done, showed EF 50-55%, concentric LV remodeling, grossly normal LV and RV systolic function. Possible compensatory response 2/2 underlying infection as above.   Patient now in sinus tachycadia  - EP recs appreciated; no need for cardioversion at this time  - continue home metoprolol succinate 200mg qd; added diltiazem 30 Q6h for persistent tachycardia --> will transition to extended release upon discharge after uptitration of diltiazem   - CHADSVASC 2; start anticoagulation w/ eliquis 5mg BID P/w chest tightness x1d associated w/ dry cough; likely multifactorial in s/o atrial flutter w/ HR 140s, notes that his chest tightness is improving as HR is lowered. Also considering contribution from CT chest findings of impacted R middle and lower lobe bronchi w/ near collapse of R middle/lower lobes.   - RVP positive for human metapneumovirus   - Procal elevated to 2.39; neutrophilic predominance on diff with 1% bands in immunosuppresed   - s/p abx: cefepime 1g q24h (3/4 - 2/8 ) --> adjusted for NORMAN  -EBV negative for active infection, MRSA negative  - fungitell > 300   - repeat BCx NGTD  - treatment of aflutter as below  -CT C/A/P negative  - Transplant ID following; f/u recs  - c/w ppx of bactrim and valcyclovir (redosed given improved Cr Cl)

## 2023-03-16 NOTE — PROGRESS NOTE ADULT - PROBLEM SELECTOR PLAN 8
Followed by jarred as outpatient; admitted 6.5 in Jan 2023, treated w/ 4d IV dexamethasone.   Per hematology, recommended to avoid blood transfusion unless patient is symptomatic due to risk of hyperhemolysis. Transfusion is okay from heart transplant perspective however per blood bank It will need to be emergent.   - maintain active T/S  - Hematology consulted for help with steroid management in setting of acute infection; to be discharge on new dose of prednisone 60 mg   - labs not concerning for hemolysis Continue home tacrolimus 5mg qd; target level 6-8  - f/u tacro level  Continue prednisone 60mg qd  Prophylaxis:   - Bactrim every other day   - Valganciclovir 450mg two times weekly   - Pantoprazole 40mg qd  - vancomycin oral   Off cellcept due to leukopenia, recurrent infections

## 2023-03-16 NOTE — PROGRESS NOTE ADULT - ASSESSMENT
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 C donor (treated) 2/23/18 (post op course complicated by graft dysfunction treated by plasmapheresis, IVIG, and rituximab), presenting w/ chest tightness and noted to have hMVP. 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    #Grayson's syndrome  -Diagnosed after he was admitted to [facility] on 1/4/23 for hemolytic anemia with hgb 6.5. He was treated with Pulse Decadron and IVIG. He received 1 unit of packed red blood cells and then prednisone was started. He was discharged on 1/7/23. Was in remission with well compensated hemolytic anemia on low dose prednisone, but relapsed after tapered to 3 mg daily. At that time, Was treated with prednisone 75 mg daily with good response.   -after admission received prednisone 75mg (3/4/23-3/6/23) and was responding with improvement in counts. Thereafter dose of steroid tapered down to 60mg daily.   -c/w prednisone 60mg, will discharge him on this dose (hematology team discussed with Dr. Rosario).  -currently off abx IV (completed coure of cefepiem 3/4-3/8) ; continue with  antimicrobial ppx   -currently Hb and plt stable Hb 10.6 Plt 303 on 3/16;  wbc 17k likely due to steroid use.   -Monitor glucose levels and treat appropriately   -Daily CBC, counts have been stable throughout hospitalization   -Low suspicion for ongoing hemolysis in view of haptoglobin 225 on 3/5  -Further care by transplant team and ID    -Follow up with Dr. Rosario at [facility] when stable for discharge    Note is not finalized until signed by attending  Azael Rondon   Hematology/Oncology Fellow PGY5  Pager [phone]

## 2023-03-16 NOTE — PROGRESS NOTE ADULT - ATTENDING COMMENTS
Patient seen and examined at bedside. He overall looks well.   - WBC is now downtrending off antibiotics, possibly secondary to new DVT.   - appreciate heme/onc  - Will adjust tac dose, based on the level (goal 6-8)  - likely discharge tomorrow

## 2023-03-16 NOTE — PROGRESS NOTE ADULT - PROBLEM SELECTOR PLAN 3
Meeting sepsis criteria w/ tachycardia to 140s, RR 24, oral temp 100.1 --> likely higher rectal temp, w/ PNA as possible source given symptoms, CT chest findings.   - treatment of PNA as above: vanc and cefepime  - f/u transplant ID recs  - Steroids decreased to 60 mg per ID and Heme Onc reccomendation  - Prelim BCX positive for gram positive cocci in pairs, f/u final cultures  - fungitell > 300; unlikely PJP given on ppx and on room air Admitted w/ HR 140s, improved after giving home dose 200mg metoprolol succinate. EP consulted and determined that patient has new-onset A-flutter. TTE done, showed EF 50-55%, concentric LV remodeling, grossly normal LV and RV systolic function. Possible compensatory response 2/2 underlying infection as above.   Patient now in sinus tachycadia  - EP recs appreciated; no need for cardioversion at this time  - continue home metoprolol succinate 200mg qd; added diltiazem 30 Q6h for persistent tachycardia --> will transition to extended release upon discharge after uptitration of diltiazem   - CHADSVASC 2; start anticoagulation w/ eliquis 5mg BID

## 2023-03-16 NOTE — PROGRESS NOTE ADULT - PROBLEM SELECTOR PLAN 7
Continue home tacrolimus 5mg qd; target level 6-8  - f/u tacro level  Continue prednisone 60mg qd  Prophylaxis:   - Bactrim every other day   - Valganciclovir 450mg two times weekly   - Pantoprazole 40mg qd  - vancomycin oral   Off cellcept due to leukopenia, recurrent infections s/p OHT in 2/23/18  - continue home Toprol  mg PO QD  - continue home ASA 81 and  pravastatin 20 mg PO QD  - continue home pantoprazole 40 mg PO QD    - HOLD losartan 100mg qd due to NORMAN, dilt gtt if HR >120s  - underwent annual RHC/EMBx with Dr. Carpenter in February 2023

## 2023-03-16 NOTE — PROGRESS NOTE ADULT - PROBLEM SELECTOR PLAN 5
Cr rising, Urine lytes consistent with pre-renal etiology   - Bladder scan negative for urinary retention   - monitor Cr  - holding losartan; can restart as needed for bp control  - s/p 1 L bolus; c/w maintenance fluids  - re-dosed ppx meds per improved CrCl (now 45) Prednisone-induced hyperglycemia w/ blood glucose elevated to 400-500 on admission. Based on outpatient records, patient has been hyperglycemic in the past, last A1c 4.7; not previously on insulin  - endocrine consulted for hyperglycemia  - on lantus 22 units and admelog 18-4-4  - has been set up for home nursing with emphasis on insulin teaching

## 2023-03-16 NOTE — PROGRESS NOTE ADULT - SUBJECTIVE AND OBJECTIVE BOX
Subjective:  No acute events. Lactate 2.9 from 3.2.     Medications:  acetaminophen     Tablet .. 650 milliGRAM(s) Oral every 6 hours PRN  albuterol/ipratropium for Nebulization 3 milliLiter(s) Nebulizer every 6 hours  apixaban 5 milliGRAM(s) Oral every 12 hours  aspirin enteric coated 81 milliGRAM(s) Oral daily  atorvastatin 10 milliGRAM(s) Oral at bedtime  dextrose 5%. 1000 milliLiter(s) IV Continuous <Continuous>  dextrose 5%. 1000 milliLiter(s) IV Continuous <Continuous>  dextrose 50% Injectable 25 Gram(s) IV Push once  dextrose 50% Injectable 12.5 Gram(s) IV Push once  dextrose 50% Injectable 25 Gram(s) IV Push once  dextrose Oral Gel 15 Gram(s) Oral once  folic acid 1 milliGRAM(s) Oral daily  glucagon  Injectable 1 milliGRAM(s) IntraMuscular once  guaiFENesin Oral Liquid (Sugar-Free) 200 milliGRAM(s) Oral every 6 hours  insulin glargine Injectable (LANTUS) 22 Unit(s) SubCutaneous at bedtime  insulin lispro (ADMELOG) corrective regimen sliding scale   SubCutaneous <User Schedule>  insulin lispro (ADMELOG) corrective regimen sliding scale   SubCutaneous three times a day before meals  insulin lispro Injectable (ADMELOG) 24 Unit(s) SubCutaneous before breakfast  insulin lispro Injectable (ADMELOG) 8 Unit(s) SubCutaneous before dinner  insulin lispro Injectable (ADMELOG) 10 Unit(s) SubCutaneous before lunch  melatonin 3 milliGRAM(s) Oral at bedtime PRN  metoprolol succinate  milliGRAM(s) Oral daily  nystatin    Suspension 263256 Unit(s) Oral <User Schedule>  pantoprazole    Tablet 40 milliGRAM(s) Oral before breakfast  polyethylene glycol 3350 17 Gram(s) Oral daily  potassium phosphate / sodium phosphate Powder (PHOS-NaK) 1 Packet(s) Oral two times a day with meals  potassium phosphate / sodium phosphate Powder (PHOS-NaK) 1 Packet(s) Oral every 2 hours  predniSONE   Tablet 60 milliGRAM(s) Oral daily  senna 1 Tablet(s) Oral daily  simethicone 80 milliGRAM(s) Chew daily  sodium chloride 3%  Inhalation 4 milliLiter(s) Inhalation every 12 hours  tacrolimus 1.5 milliGRAM(s) Oral <User Schedule>  trimethoprim   80 mG/sulfamethoxazole 400 mG 1 Tablet(s) Oral every 24 hours  valGANciclovir 450 milliGRAM(s) Oral daily    Vitals:  T(C): 36.6 (03-16-23 @ 11:10), Max: 36.7 (03-15-23 @ 21:00)  HR: 99 (03-16-23 @ 11:10) (99 - 104)  BP: 118/82 (03-16-23 @ 11:10) (118/82 - 151/98)  BP(mean): 94 (03-16-23 @ 11:10) (94 - 94)  RR: 18 (03-16-23 @ 11:10) (18 - 18)  SpO2: 100% (03-16-23 @ 11:10) (99% - 100%)      I&O's Summary    15 Mar 2023 07:01  -  16 Mar 2023 07:00  --------------------------------------------------------  IN: 0 mL / OUT: 300 mL / NET: -300 mL        Physical Exam:  Appearance: No acute distress; well appearing  Eyes: no scleral icterus   HEENT: Normal oral mucosa  Cardiovascular: RRR, S1, S2, no murmurs, rubs, or gallops; no edema  Respiratory: No respiratory distress, no auditory stridor    Gastrointestinal: Nondistended, appears soft  Musculoskeletal: No clubbing; no joint deformity   Neurologic: Moving all 4 extremities spontaneously  Psychiatry: Not willing to answer any questions  Skin: No rashes, ecchymoses, or cyanosis          Labs:                        10.6   17.59 )-----------( 303      ( 16 Mar 2023 06:45 )             34.5     03-16    140  |  107  |  39<H>  ----------------------------<  194<H>  4.3   |  21<L>  |  1.56<H>    Ca    6.7<L>      16 Mar 2023 06:45  Phos  1.8     03-16  Mg     1.6     03-16    TPro  5.5<L>  /  Alb  3.2<L>  /  TBili  0.5  /  DBili  x   /  AST  25  /  ALT  25  /  AlkPhos  81  03-16        Tacrolimus (), Serum: 2.1 ng/mL (03-15-23 @ 11:09)  Tacrolimus (), Serum: 9.9 ng/mL (03-14-23 @ 11:38)  Tacrolimus (), Serum: 2.4 ng/mL (03-12-23 @ 11:55)  Tacrolimus (), Serum: 2.6 ng/mL (03-11-23 @ 10:55)  Tacrolimus (), Serum: 3.0 ng/mL (03-10-23 @ 09:08)

## 2023-03-16 NOTE — PROGRESS NOTE ADULT - PROBLEM SELECTOR PLAN 4
- on last admission it was stated by heme to avoid blood transfusion unless he is significantly symptomatic due to risk of hyperhemolysis. Of note if a transfusion is needed it is ok from heart transplant perspective if needed.  - appreciate heme/onc consult for hemolytic anemia management and prednisone dosing given concern for oppurtunistic infection  - currently on pred 60 mg daily   - needs insulin diabetic diet teaching as glucose not well controlled.

## 2023-03-16 NOTE — PROGRESS NOTE ADULT - SUBJECTIVE AND OBJECTIVE BOX
INTERVAL HPI/OVERNIGHT EVENTS:    SUBJECTIVE: Patient seen and examined at bedside.    OBJECTIVE:    VITAL SIGNS:  ICU Vital Signs Last 24 Hrs  T(C): 36.4 (16 Mar 2023 04:27), Max: 36.7 (15 Mar 2023 21:00)  T(F): 97.5 (16 Mar 2023 04:27), Max: 98 (15 Mar 2023 21:00)  HR: 104 (16 Mar 2023 04:27) (100 - 104)  BP: 151/98 (16 Mar 2023 04:27) (114/79 - 151/98)  BP(mean): 91 (15 Mar 2023 11:10) (91 - 91)  ABP: --  ABP(mean): --  RR: 18 (16 Mar 2023 04:27) (18 - 18)  SpO2: 100% (16 Mar 2023 04:27) (98% - 100%)    O2 Parameters below as of 16 Mar 2023 04:27  Patient On (Oxygen Delivery Method): room air              03-15 @ 07:01  -  03-16 @ 07:00  --------------------------------------------------------  IN: 0 mL / OUT: 300 mL / NET: -300 mL      CAPILLARY BLOOD GLUCOSE      POCT Blood Glucose.: 248 mg/dL (16 Mar 2023 03:07)      PHYSICAL EXAM:    General: NAD  HEENT: NC/AT; PERRL, clear conjunctiva  Neck: supple  Respiratory: CTA b/l  Cardiovascular: +S1/S2; RRR  Abdomen: soft, NT/ND; +BS x4  Extremities:  no LE edema  Vascular: WWP, 2+ peripheral pulses b/l;  Skin: normal color and turgor; no rash  Neurological: A&Ox3, move all extremities. CN II-XII intact    MEDICATIONS:  MEDICATIONS  (STANDING):  albuterol/ipratropium for Nebulization 3 milliLiter(s) Nebulizer every 6 hours  apixaban 5 milliGRAM(s) Oral every 12 hours  aspirin enteric coated 81 milliGRAM(s) Oral daily  atorvastatin 10 milliGRAM(s) Oral at bedtime  calcium carbonate   1250 mG (OsCal) 1 Tablet(s) Oral once  dextrose 5%. 1000 milliLiter(s) (50 mL/Hr) IV Continuous <Continuous>  dextrose 5%. 1000 milliLiter(s) (100 mL/Hr) IV Continuous <Continuous>  dextrose 50% Injectable 25 Gram(s) IV Push once  dextrose 50% Injectable 12.5 Gram(s) IV Push once  dextrose 50% Injectable 25 Gram(s) IV Push once  dextrose Oral Gel 15 Gram(s) Oral once  folic acid 1 milliGRAM(s) Oral daily  glucagon  Injectable 1 milliGRAM(s) IntraMuscular once  guaiFENesin Oral Liquid (Sugar-Free) 200 milliGRAM(s) Oral every 6 hours  insulin glargine Injectable (LANTUS) 22 Unit(s) SubCutaneous at bedtime  insulin lispro (ADMELOG) corrective regimen sliding scale   SubCutaneous <User Schedule>  insulin lispro (ADMELOG) corrective regimen sliding scale   SubCutaneous three times a day before meals  insulin lispro Injectable (ADMELOG) 24 Unit(s) SubCutaneous before breakfast  insulin lispro Injectable (ADMELOG) 8 Unit(s) SubCutaneous before dinner  insulin lispro Injectable (ADMELOG) 10 Unit(s) SubCutaneous before lunch  metoprolol succinate  milliGRAM(s) Oral daily  nystatin    Suspension 879276 Unit(s) Oral <User Schedule>  pantoprazole    Tablet 40 milliGRAM(s) Oral before breakfast  polyethylene glycol 3350 17 Gram(s) Oral daily  potassium phosphate / sodium phosphate Powder (PHOS-NaK) 1 Packet(s) Oral two times a day with meals  potassium phosphate / sodium phosphate Powder (PHOS-NaK) 1 Packet(s) Oral every 2 hours  predniSONE   Tablet 60 milliGRAM(s) Oral daily  senna 1 Tablet(s) Oral daily  simethicone 80 milliGRAM(s) Chew daily  sodium chloride 3%  Inhalation 4 milliLiter(s) Inhalation every 12 hours  tacrolimus 1.5 milliGRAM(s) Oral <User Schedule>  trimethoprim   80 mG/sulfamethoxazole 400 mG 1 Tablet(s) Oral every 24 hours  valGANciclovir 450 milliGRAM(s) Oral daily    MEDICATIONS  (PRN):  acetaminophen     Tablet .. 650 milliGRAM(s) Oral every 6 hours PRN Temp greater or equal to 38C (100.4F), Mild Pain (1 - 3)  melatonin 3 milliGRAM(s) Oral at bedtime PRN Insomnia      ALLERGIES:  Allergies    No Known Allergies    Intolerances        LABS:                        10.6   17.59 )-----------( 303      ( 16 Mar 2023 06:45 )             34.5     03-16    140  |  107  |  39<H>  ----------------------------<  194<H>  4.3   |  21<L>  |  1.56<H>    Ca    6.7<L>      16 Mar 2023 06:45  Phos  1.8     03-16  Mg     1.6     03-16    TPro  5.5<L>  /  Alb  3.2<L>  /  TBili  0.5  /  DBili  x   /  AST  25  /  ALT  25  /  AlkPhos  81  03-16          RADIOLOGY & ADDITIONAL TESTS: Reviewed. INTERVAL HPI/OVERNIGHT EVENTS: KEVIN OVN     SUBJECTIVE: Patient seen and examined at bedside. Patient states he is feeling fine this AM and would like to go home. Otherwise denies any chest pain, palpitations, cough, lower extremity pain.     OBJECTIVE:    VITAL SIGNS:  ICU Vital Signs Last 24 Hrs  T(C): 36.4 (16 Mar 2023 04:27), Max: 36.7 (15 Mar 2023 21:00)  T(F): 97.5 (16 Mar 2023 04:27), Max: 98 (15 Mar 2023 21:00)  HR: 104 (16 Mar 2023 04:27) (100 - 104)  BP: 151/98 (16 Mar 2023 04:27) (114/79 - 151/98)  BP(mean): 91 (15 Mar 2023 11:10) (91 - 91)  ABP: --  ABP(mean): --  RR: 18 (16 Mar 2023 04:27) (18 - 18)  SpO2: 100% (16 Mar 2023 04:27) (98% - 100%)    O2 Parameters below as of 16 Mar 2023 04:27  Patient On (Oxygen Delivery Method): room air              03-15 @ 07:01  -  03-16 @ 07:00  --------------------------------------------------------  IN: 0 mL / OUT: 300 mL / NET: -300 mL      CAPILLARY BLOOD GLUCOSE      POCT Blood Glucose.: 248 mg/dL (16 Mar 2023 03:07)      PHYSICAL EXAM:    General: NAD  HEENT: NC/AT; PERRL, clear conjunctiva  Neck: supple  Respiratory: CTA b/l  Cardiovascular: +S1/S2; RRR  Abdomen: soft, NT/ND; +BS x4  Extremities:  no LE edema  Vascular: WWP, 2+ peripheral pulses b/l;  Skin: normal color and turgor; no rash  Neurological: A&Ox3, move all extremities. CN II-XII intact    MEDICATIONS:  MEDICATIONS  (STANDING):  albuterol/ipratropium for Nebulization 3 milliLiter(s) Nebulizer every 6 hours  apixaban 5 milliGRAM(s) Oral every 12 hours  aspirin enteric coated 81 milliGRAM(s) Oral daily  atorvastatin 10 milliGRAM(s) Oral at bedtime  calcium carbonate   1250 mG (OsCal) 1 Tablet(s) Oral once  dextrose 5%. 1000 milliLiter(s) (50 mL/Hr) IV Continuous <Continuous>  dextrose 5%. 1000 milliLiter(s) (100 mL/Hr) IV Continuous <Continuous>  dextrose 50% Injectable 25 Gram(s) IV Push once  dextrose 50% Injectable 12.5 Gram(s) IV Push once  dextrose 50% Injectable 25 Gram(s) IV Push once  dextrose Oral Gel 15 Gram(s) Oral once  folic acid 1 milliGRAM(s) Oral daily  glucagon  Injectable 1 milliGRAM(s) IntraMuscular once  guaiFENesin Oral Liquid (Sugar-Free) 200 milliGRAM(s) Oral every 6 hours  insulin glargine Injectable (LANTUS) 22 Unit(s) SubCutaneous at bedtime  insulin lispro (ADMELOG) corrective regimen sliding scale   SubCutaneous <User Schedule>  insulin lispro (ADMELOG) corrective regimen sliding scale   SubCutaneous three times a day before meals  insulin lispro Injectable (ADMELOG) 24 Unit(s) SubCutaneous before breakfast  insulin lispro Injectable (ADMELOG) 8 Unit(s) SubCutaneous before dinner  insulin lispro Injectable (ADMELOG) 10 Unit(s) SubCutaneous before lunch  metoprolol succinate  milliGRAM(s) Oral daily  nystatin    Suspension 451193 Unit(s) Oral <User Schedule>  pantoprazole    Tablet 40 milliGRAM(s) Oral before breakfast  polyethylene glycol 3350 17 Gram(s) Oral daily  potassium phosphate / sodium phosphate Powder (PHOS-NaK) 1 Packet(s) Oral two times a day with meals  potassium phosphate / sodium phosphate Powder (PHOS-NaK) 1 Packet(s) Oral every 2 hours  predniSONE   Tablet 60 milliGRAM(s) Oral daily  senna 1 Tablet(s) Oral daily  simethicone 80 milliGRAM(s) Chew daily  sodium chloride 3%  Inhalation 4 milliLiter(s) Inhalation every 12 hours  tacrolimus 1.5 milliGRAM(s) Oral <User Schedule>  trimethoprim   80 mG/sulfamethoxazole 400 mG 1 Tablet(s) Oral every 24 hours  valGANciclovir 450 milliGRAM(s) Oral daily    MEDICATIONS  (PRN):  acetaminophen     Tablet .. 650 milliGRAM(s) Oral every 6 hours PRN Temp greater or equal to 38C (100.4F), Mild Pain (1 - 3)  melatonin 3 milliGRAM(s) Oral at bedtime PRN Insomnia      ALLERGIES:  Allergies    No Known Allergies    Intolerances        LABS:                        10.6   17.59 )-----------( 303      ( 16 Mar 2023 06:45 )             34.5     03-16    140  |  107  |  39<H>  ----------------------------<  194<H>  4.3   |  21<L>  |  1.56<H>    Ca    6.7<L>      16 Mar 2023 06:45  Phos  1.8     03-16  Mg     1.6     03-16    TPro  5.5<L>  /  Alb  3.2<L>  /  TBili  0.5  /  DBili  x   /  AST  25  /  ALT  25  /  AlkPhos  81  03-16          RADIOLOGY & ADDITIONAL TESTS: Reviewed.

## 2023-03-16 NOTE — PROGRESS NOTE ADULT - ASSESSMENT
73 y/o male with hx of nonischemic cardiomyopathy s/p HM2 LVAD, underwent OHT 2/23/18 with hepatitis C donor, hx of hemolytic anemia presented w/ 1d chest tightness, found to have new onset aflutter w/ RVR, Patient found to have human metapneumovirus with likely superimposed bacterial infection given elevated procal and CT findings of RLL and RML collapse. Course c/b elevated fungitell and persistently high white count 71 y/o male with hx of nonischemic cardiomyopathy s/p HM2 LVAD, underwent OHT 2/23/18 with hepatitis C donor, hx of hemolytic anemia presented w/ 1d chest tightness, found to have new onset aflutter w/ RVR, Patient found to have human metapneumovirus with likely superimposed bacterial infection given elevated procal and CT findings of RLL and RML collapse. Course c/b elevated fungitell and persistently high white count found to have bilateral DVTs.

## 2023-03-16 NOTE — PROGRESS NOTE ADULT - PROBLEM SELECTOR PLAN 2
- pulm consult for bronch but not indicated per them  - continue home bactrim, valganciclovir, antifungal ppx  - Tacrolimus 2.1 on 3/15, goal 6-8, will f/u repeat level today (obtain tacro level 30min prior to administration in AM)  - Continue Tacrolimus 1.5mg BID for now  - f/u transplant ID recs

## 2023-03-16 NOTE — PROGRESS NOTE ADULT - ASSESSMENT
71y/o M w/h/o HTN, HLD, NICM, chronic systolic heart failure s/p HM2 LVAD (6/2017) s/p heart transplant from Hep. C donor (treated) 2/23/18 (post op course complicated by graft dysfunction treated by plasmapheresis, IVIG, and rituximab). No DM hx per pt report. Here w/ chest tightness x1d found to be in aflutter 2/2 pneumonia and more recently found to have Hemolytic anemia> on steroids. Endocrinology consulted for management steroid induced hyperglycemia. Tolerating POs with BG values variable in setting of dietary indiscretions.      Steroid-induced hyperglycemia.   ·  Plan:   - BG Goal 100-180mg/dl   - test BG AC/HS  - c/w Lantus 22 units QHS, can reduce dose by 50% x1  if BG <100  - Bedtime BG tightly controlled x1 in setting of low carb meal, Monitor on Admelog 24-10-8 w/meals FOR NOW. HOLD IF NOT EATING.   - c/w Admelog moderate correction scale AC and mod HS scale for now  -cons carb diet  -On prednisone 60mg PO daily. Notify endocrine team if this is changed.   - Insulin pen/glucometer review w/RN  Discharge plan:  -Will be determined according to BG/insulin needs/PO intake and steroid therapy at time of discharge.  Send RX for Lantus solostar Pen 22 units QHS and Admelog pen 20-10-8 w/meals  -Pt has new glucometer w/supplies at bedside-needs RN to review glucometer prior to discharge   -Please teach and allow pt to do own finger sticks and insulin injections under RN supervision  Please teach use of insulin pen and Please document teach back  - Home care due to new DM diagnosis and likely need of insulin therapy  - Patient to call doctor with persistent high or low BG at home.   - Recommend routine outpatient ophthalmology, podiatry   - Will need endocrinology f/u if pt send home on insulin/high dose steroids Can follow at [facility]. [address] [place] [address] [address]. Phone .   March 28 11am w/NP and Aug 15th 10:45am w/Dr Clemens.     Problem/Plan - 2:  ·  Problem: HTN (hypertension).   ·  Plan: : Outpatient goal BP <130/80.   - Management per primary team.     Problem/Plan - 3:  ·  Problem: HLD (hyperlipidemia).   ·  Plan: Home regimen: pravastatin 20mg daily  - LDL goal <70 . Pt LDL 51  - Continue atorvastatin 10mg daily while in patient  - Restart home statin upon discharge if not contraindicated.         Contact via [employer] Teams during business hours  To reach covering provider access [employer] via "[email or web address]  For Urgent matters/after-hours/weekends/holidays please page endocrine fellow on call   For nonurgent matters please email [email or web address]    Please note that this patient may be followed by different provider tomorrow.  Notify endocrine 24 hours prior to discharge for final recommendations    greater than 50% of the encounter was spent counseling and/or coordination of care.  27 minutes spent on total encounter; The necessity of the time spent during the encounter on this date of service was due to development of plan of care/coordination of care/glycemic control through review of labs, blood glucose values and vital signs.

## 2023-03-16 NOTE — PROGRESS NOTE ADULT - SUBJECTIVE AND OBJECTIVE BOX
seen earlier today     Chief Complaint: Type 2 Diabetes Mellitus     INTERVAL HX: patient reports omitting rice from dinner tray yesterday  , bedtime BG tightly controlled to 71  pt denies any hypoglycemia symptoms, had pudding and juice with rebound hyperglycemia over night to 248 no hypoglycemia, received half dose of lantus by primary team for tightly controlled BG at bedtime with FBG at goal this am . when discussing patients ability to perform pen/glucometer patient reports he knows he has to do it and has done it before, has completed reeducation this admission as well       Review of Systems:  General: As above  Cardiovascular: No chest pain  Respiratory: No SOB  GI: No nausea, vomiting  Endocrine: no  S&Sx of hypoglycemia    Allergies    No Known Allergies    Intolerances      MEDICATIONS  (STANDING):  albuterol/ipratropium for Nebulization 3 milliLiter(s) Nebulizer every 6 hours  apixaban 5 milliGRAM(s) Oral every 12 hours  aspirin enteric coated 81 milliGRAM(s) Oral daily  atorvastatin 10 milliGRAM(s) Oral at bedtime  dextrose 5%. 1000 milliLiter(s) (50 mL/Hr) IV Continuous <Continuous>  dextrose 5%. 1000 milliLiter(s) (100 mL/Hr) IV Continuous <Continuous>  dextrose 50% Injectable 25 Gram(s) IV Push once  dextrose 50% Injectable 12.5 Gram(s) IV Push once  dextrose 50% Injectable 25 Gram(s) IV Push once  dextrose Oral Gel 15 Gram(s) Oral once  folic acid 1 milliGRAM(s) Oral daily  glucagon  Injectable 1 milliGRAM(s) IntraMuscular once  guaiFENesin Oral Liquid (Sugar-Free) 200 milliGRAM(s) Oral every 6 hours  insulin glargine Injectable (LANTUS) 22 Unit(s) SubCutaneous at bedtime  insulin lispro (ADMELOG) corrective regimen sliding scale   SubCutaneous <User Schedule>  insulin lispro (ADMELOG) corrective regimen sliding scale   SubCutaneous three times a day before meals  insulin lispro Injectable (ADMELOG) 24 Unit(s) SubCutaneous before breakfast  insulin lispro Injectable (ADMELOG) 8 Unit(s) SubCutaneous before dinner  insulin lispro Injectable (ADMELOG) 10 Unit(s) SubCutaneous before lunch  lactated ringers Bolus 500 milliLiter(s) IV Bolus once  metoprolol succinate  milliGRAM(s) Oral daily  nystatin    Suspension 479582 Unit(s) Oral <User Schedule>  pantoprazole    Tablet 40 milliGRAM(s) Oral before breakfast  polyethylene glycol 3350 17 Gram(s) Oral daily  potassium phosphate / sodium phosphate Powder (PHOS-NaK) 1 Packet(s) Oral two times a day with meals  potassium phosphate / sodium phosphate Powder (PHOS-NaK) 1 Packet(s) Oral every 2 hours  predniSONE   Tablet 60 milliGRAM(s) Oral daily  senna 1 Tablet(s) Oral daily  simethicone 80 milliGRAM(s) Chew daily  sodium chloride 3%  Inhalation 4 milliLiter(s) Inhalation every 12 hours  tacrolimus 1.5 milliGRAM(s) Oral <User Schedule>  trimethoprim   80 mG/sulfamethoxazole 400 mG 1 Tablet(s) Oral every 24 hours  valGANciclovir 450 milliGRAM(s) Oral daily      atorvastatin   10 milliGRAM(s) Oral (03-15-23 @ 22:16)    insulin glargine Injectable (LANTUS)   11 Unit(s) SubCutaneous (03-15-23 @ 23:49)    insulin lispro (ADMELOG) corrective regimen sliding scale   2 Unit(s) SubCutaneous (03-16-23 @ 09:18)   6 Unit(s) SubCutaneous (03-15-23 @ 17:47)   12 Unit(s) SubCutaneous (03-15-23 @ 13:38)    insulin lispro Injectable (ADMELOG)   24 Unit(s) SubCutaneous (03-16-23 @ 09:18)    insulin lispro Injectable (ADMELOG)   8 Unit(s) SubCutaneous (03-15-23 @ 17:49)    insulin lispro Injectable (ADMELOG)   10 Unit(s) SubCutaneous (03-15-23 @ 13:38)    predniSONE   Tablet   60 milliGRAM(s) Oral (03-16-23 @ 05:27)        PHYSICAL EXAM:  VITALS: T(C): 36.6 (03-16-23 @ 11:10)  T(F): 97.8 (03-16-23 @ 11:10), Max: 98 (03-15-23 @ 21:00)  HR: 99 (03-16-23 @ 11:10) (99 - 104)  BP: 118/82 (03-16-23 @ 11:10) (118/82 - 151/98)  RR:  (18 - 18)  SpO2:  (99% - 100%)  Wt(kg): --  GENERAL: male sitting in chair  in NAD  Respiratory: Respirations unlabored   Extremities: Warm, no edema  NEURO: Alert , appropriate     LABS:  POCT Blood Glucose.: 160 mg/dL (03-16-23 @ 09:07)  POCT Blood Glucose.: 248 mg/dL (03-16-23 @ 03:07)  POCT Blood Glucose.: 134 mg/dL (03-15-23 @ 23:24)  POCT Blood Glucose.: 71 mg/dL (03-15-23 @ 22:23)  POCT Blood Glucose.: 75 mg/dL (03-15-23 @ 21:43)  POCT Blood Glucose.: 269 mg/dL (03-15-23 @ 17:17)  POCT Blood Glucose.: 457 mg/dL (03-15-23 @ 13:36)  POCT Blood Glucose.: 445 mg/dL (03-15-23 @ 13:34)  POCT Blood Glucose.: 191 mg/dL (03-15-23 @ 09:02)  POCT Blood Glucose.: 158 mg/dL (03-15-23 @ 02:04)  POCT Blood Glucose.: 176 mg/dL (03-14-23 @ 21:23)  POCT Blood Glucose.: 263 mg/dL (03-14-23 @ 17:20)  POCT Blood Glucose.: 352 mg/dL (03-14-23 @ 12:44)  POCT Blood Glucose.: 176 mg/dL (03-14-23 @ 08:59)  POCT Blood Glucose.: 159 mg/dL (03-14-23 @ 01:09)  POCT Blood Glucose.: 197 mg/dL (03-13-23 @ 21:47)  POCT Blood Glucose.: 289 mg/dL (03-13-23 @ 17:16)  POCT Blood Glucose.: 177 mg/dL (03-13-23 @ 12:53)                          10.6   17.59 )-----------( 303      ( 16 Mar 2023 06:45 )             34.5     03-16    140  |  107  |  39<H>  ----------------------------<  194<H>  4.3   |  21<L>  |  1.56<H>    Ca    6.7<L>      16 Mar 2023 06:45  Phos  1.8     03-16  Mg     1.6     03-16    TPro  5.5<L>  /  Alb  3.2<L>  /  TBili  0.5  /  DBili  x   /  AST  25  /  ALT  25  /  AlkPhos  81  03-16    eGFR: 47 mL/min/1.73m2 (16 Mar 2023 06:45)    02-17 Chol 143 Direct LDL -- LDL calculated 51 HDL 69 Trig 116  Thyroid Function Tests:  03-03 @ 15:09 TSH 1.06 FreeT4 -- T3 -- Anti TPO -- Anti Thyroglobulin Ab -- TSI --      A1C with Estimated Average Glucose Result: 4.7 % (02-17-23 @ 11:59)    Estimated Average Glucose: 88 mg/dL (02-17-23 @ 11:59)        Diet, Regular:   Consistent Carbohydrate No Snacks (CSTCHO)  DASH/TLC Sodium & Cholesterol Restricted (DASH) (03-04-23 @ 11:54) [Active]

## 2023-03-16 NOTE — PROGRESS NOTE ADULT - ASSESSMENT
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 He is now back in sinus tachycardia. Course c/b by URI w/ human metapneumovirus. Would assess for superimposed infection given immunosuppression. Afib/flutter would be concerning for rejection, but repeat echo here with normal LV function. Also noted to have acute on chronic kidney injury likely 2/2 hypovolemia. Now with worsening leukocytosis with CT chest c/f PNA.     Cardiac studies:  TTE 3/3/23: LVIDd 3.6, EF 50-55%, concentric remodeling     Relevant Labs:  Tacrolimus 2.1 on 3/15, 9.9 on 3/14

## 2023-03-16 NOTE — PROGRESS NOTE ADULT - PROBLEM SELECTOR PLAN 9
On febuxostat 40mg qd, followed by rheumatology Followed by [facility] as outpatient; admitted 6.5 in Jan 2023, treated w/ 4d IV dexamethasone.   Per hematology, recommended to avoid blood transfusion unless patient is symptomatic due to risk of hyperhemolysis. Transfusion is okay from heart transplant perspective however per blood bank It will need to be emergent.   - maintain active T/S  - Hematology consulted for help with steroid management in setting of acute infection; to be discharge on new dose of prednisone 60 mg   - labs not concerning for hemolysis

## 2023-03-16 NOTE — PROGRESS NOTE ADULT - SUBJECTIVE AND OBJECTIVE BOX
INTERVAL HPI/OVERNIGHT EVENTS:  Patient S&E at bedside. No o/n events. Denied headache, N/V/D, SOB, chest /abd pain, changes with urination and BMs.     VITAL SIGNS:  T(F): 97.8 (03-16-23 @ 11:10)  HR: 99 (03-16-23 @ 11:10)  BP: 118/82 (03-16-23 @ 11:10)  RR: 18 (03-16-23 @ 11:10)  SpO2: 100% (03-16-23 @ 11:10)  Wt(kg): --    PHYSICAL EXAM:    Constitutional: NAD, sitting on the chair, responded verbally well   Eyes: EOMI, sclera non-icteric  Neck: supple  Respiratory: CTAB, no wheezes or crackles   Cardiovascular: irregular rhythm; no M/R/G  Gastrointestinal: soft, NTND, + BS  Extremities: no cyanosis, clubbing; bilateral Jaquan edema to the level of mid legs  Neurological: awake and alert      MEDICATIONS  (STANDING):  albuterol/ipratropium for Nebulization 3 milliLiter(s) Nebulizer every 6 hours  apixaban 5 milliGRAM(s) Oral every 12 hours  aspirin enteric coated 81 milliGRAM(s) Oral daily  atorvastatin 10 milliGRAM(s) Oral at bedtime  dextrose 5%. 1000 milliLiter(s) (50 mL/Hr) IV Continuous <Continuous>  dextrose 5%. 1000 milliLiter(s) (100 mL/Hr) IV Continuous <Continuous>  dextrose 50% Injectable 25 Gram(s) IV Push once  dextrose 50% Injectable 12.5 Gram(s) IV Push once  dextrose 50% Injectable 25 Gram(s) IV Push once  dextrose Oral Gel 15 Gram(s) Oral once  folic acid 1 milliGRAM(s) Oral daily  glucagon  Injectable 1 milliGRAM(s) IntraMuscular once  guaiFENesin Oral Liquid (Sugar-Free) 200 milliGRAM(s) Oral every 6 hours  insulin glargine Injectable (LANTUS) 22 Unit(s) SubCutaneous at bedtime  insulin lispro (ADMELOG) corrective regimen sliding scale   SubCutaneous <User Schedule>  insulin lispro (ADMELOG) corrective regimen sliding scale   SubCutaneous three times a day before meals  insulin lispro Injectable (ADMELOG) 8 Unit(s) SubCutaneous before dinner  insulin lispro Injectable (ADMELOG) 10 Unit(s) SubCutaneous before lunch  losartan 25 milliGRAM(s) Oral daily  metoprolol succinate  milliGRAM(s) Oral daily  nystatin    Suspension 213237 Unit(s) Oral <User Schedule>  pantoprazole    Tablet 40 milliGRAM(s) Oral before breakfast  polyethylene glycol 3350 17 Gram(s) Oral daily  potassium phosphate / sodium phosphate Powder (PHOS-NaK) 1 Packet(s) Oral two times a day with meals  predniSONE   Tablet 60 milliGRAM(s) Oral daily  senna 1 Tablet(s) Oral daily  simethicone 80 milliGRAM(s) Chew daily  sodium chloride 3%  Inhalation 4 milliLiter(s) Inhalation every 12 hours  tacrolimus 2.5 milliGRAM(s) Oral <User Schedule>  trimethoprim   80 mG/sulfamethoxazole 400 mG 1 Tablet(s) Oral every 24 hours  valGANciclovir 450 milliGRAM(s) Oral daily    MEDICATIONS  (PRN):  acetaminophen     Tablet .. 650 milliGRAM(s) Oral every 6 hours PRN Temp greater or equal to 38C (100.4F), Mild Pain (1 - 3)  melatonin 3 milliGRAM(s) Oral at bedtime PRN Insomnia      Allergies    No Known Allergies    Intolerances        LABS:                        10.6   17.59 )-----------( 303      ( 16 Mar 2023 06:45 )             34.5     03-16    140  |  107  |  39<H>  ----------------------------<  194<H>  4.3   |  21<L>  |  1.56<H>    Ca    6.7<L>      16 Mar 2023 06:45  Phos  1.8     03-16  Mg     1.6     03-16    TPro  5.5<L>  /  Alb  3.2<L>  /  TBili  0.5  /  DBili  x   /  AST  25  /  ALT  25  /  AlkPhos  81  03-16          RADIOLOGY & ADDITIONAL TESTS:  Studies reviewed.

## 2023-03-16 NOTE — PROGRESS NOTE ADULT - PROBLEM SELECTOR PLAN 1
- s/p OHT in 2/23/18  - continue home Toprol  mg PO QD; losartan on hold d/t renal dysfunction   - continue home ASA 81 and  atorvastatin 10 mg PO QD  - continue home pantoprazole 40 mg PO QD    - was planned for repeat EMBx but unlikely rejection so defer  - d/c diltiazem as tachycardia is common post-transplant.  - Elevated Lactate, asymptomatic. Give 500cc IVF bolus and recheck

## 2023-03-17 LAB
ANION GAP SERPL CALC-SCNC: 12 MMOL/L — SIGNIFICANT CHANGE UP (ref 5–17)
ANION GAP SERPL CALC-SCNC: 13 MMOL/L — SIGNIFICANT CHANGE UP (ref 5–17)
BASE EXCESS BLDV CALC-SCNC: -2.4 MMOL/L — LOW (ref -2–3)
BASOPHILS # BLD AUTO: 0.05 K/UL — SIGNIFICANT CHANGE UP (ref 0–0.2)
BASOPHILS NFR BLD AUTO: 0.3 % — SIGNIFICANT CHANGE UP (ref 0–2)
BLOOD GAS VENOUS - CREATININE: SIGNIFICANT CHANGE UP MG/DL (ref 0.5–1.3)
BUN SERPL-MCNC: 34 MG/DL — HIGH (ref 7–23)
BUN SERPL-MCNC: 34 MG/DL — HIGH (ref 7–23)
CA-I SERPL-SCNC: 1.03 MMOL/L — LOW (ref 1.15–1.33)
CALCIUM SERPL-MCNC: 7.2 MG/DL — LOW (ref 8.4–10.5)
CALCIUM SERPL-MCNC: 7.3 MG/DL — LOW (ref 8.4–10.5)
CHLORIDE BLDV-SCNC: 106 MMOL/L — SIGNIFICANT CHANGE UP (ref 96–108)
CHLORIDE SERPL-SCNC: 103 MMOL/L — SIGNIFICANT CHANGE UP (ref 96–108)
CHLORIDE SERPL-SCNC: 103 MMOL/L — SIGNIFICANT CHANGE UP (ref 96–108)
CO2 BLDV-SCNC: 26 MMOL/L — SIGNIFICANT CHANGE UP (ref 22–26)
CO2 SERPL-SCNC: 21 MMOL/L — LOW (ref 22–31)
CO2 SERPL-SCNC: 23 MMOL/L — SIGNIFICANT CHANGE UP (ref 22–31)
CREAT SERPL-MCNC: 1.52 MG/DL — HIGH (ref 0.5–1.3)
CREAT SERPL-MCNC: 1.58 MG/DL — HIGH (ref 0.5–1.3)
EGFR: 46 ML/MIN/1.73M2 — LOW
EGFR: 48 ML/MIN/1.73M2 — LOW
EOSINOPHIL # BLD AUTO: 0 K/UL — SIGNIFICANT CHANGE UP (ref 0–0.5)
EOSINOPHIL NFR BLD AUTO: 0 % — SIGNIFICANT CHANGE UP (ref 0–6)
GAS PNL BLDV: 136 MMOL/L — SIGNIFICANT CHANGE UP (ref 136–145)
GAS PNL BLDV: SIGNIFICANT CHANGE UP
GAS PNL BLDV: SIGNIFICANT CHANGE UP
GLUCOSE BLDC GLUCOMTR-MCNC: 106 MG/DL — HIGH (ref 70–99)
GLUCOSE BLDC GLUCOMTR-MCNC: 126 MG/DL — HIGH (ref 70–99)
GLUCOSE BLDC GLUCOMTR-MCNC: 140 MG/DL — HIGH (ref 70–99)
GLUCOSE BLDC GLUCOMTR-MCNC: 146 MG/DL — HIGH (ref 70–99)
GLUCOSE BLDC GLUCOMTR-MCNC: 99 MG/DL — SIGNIFICANT CHANGE UP (ref 70–99)
GLUCOSE BLDV-MCNC: 148 MG/DL — HIGH (ref 70–99)
GLUCOSE SERPL-MCNC: 154 MG/DL — HIGH (ref 70–99)
GLUCOSE SERPL-MCNC: 154 MG/DL — HIGH (ref 70–99)
HCO3 BLDV-SCNC: 24 MMOL/L — SIGNIFICANT CHANGE UP (ref 22–29)
HCT VFR BLD CALC: 35.1 % — LOW (ref 39–50)
HCT VFR BLD CALC: 35.1 % — LOW (ref 39–50)
HCT VFR BLDA CALC: 33 % — LOW (ref 39–51)
HGB BLD CALC-MCNC: 11.1 G/DL — LOW (ref 12.6–17.4)
HGB BLD-MCNC: 10.6 G/DL — LOW (ref 13–17)
HGB BLD-MCNC: 10.8 G/DL — LOW (ref 13–17)
IMM GRANULOCYTES NFR BLD AUTO: 6.2 % — HIGH (ref 0–0.9)
LACTATE BLDV-MCNC: 2.9 MMOL/L — HIGH (ref 0.5–2)
LACTATE SERPL-SCNC: 3.5 MMOL/L — HIGH (ref 0.5–2)
LYMPHOCYTES # BLD AUTO: 0.8 K/UL — LOW (ref 1–3.3)
LYMPHOCYTES # BLD AUTO: 4.6 % — LOW (ref 13–44)
MAGNESIUM SERPL-MCNC: 1.4 MG/DL — LOW (ref 1.6–2.6)
MCHC RBC-ENTMCNC: 30.2 GM/DL — LOW (ref 32–36)
MCHC RBC-ENTMCNC: 30.8 GM/DL — LOW (ref 32–36)
MCHC RBC-ENTMCNC: 32.6 PG — SIGNIFICANT CHANGE UP (ref 27–34)
MCHC RBC-ENTMCNC: 33.4 PG — SIGNIFICANT CHANGE UP (ref 27–34)
MCV RBC AUTO: 108 FL — HIGH (ref 80–100)
MCV RBC AUTO: 108.7 FL — HIGH (ref 80–100)
MONOCYTES # BLD AUTO: 0.45 K/UL — SIGNIFICANT CHANGE UP (ref 0–0.9)
MONOCYTES NFR BLD AUTO: 2.6 % — SIGNIFICANT CHANGE UP (ref 2–14)
NEUTROPHILS # BLD AUTO: 14.86 K/UL — HIGH (ref 1.8–7.4)
NEUTROPHILS NFR BLD AUTO: 86.3 % — HIGH (ref 43–77)
NRBC # BLD: 0 /100 WBCS — SIGNIFICANT CHANGE UP (ref 0–0)
NRBC # BLD: 0 /100 WBCS — SIGNIFICANT CHANGE UP (ref 0–0)
PCO2 BLDV: 51 MMHG — SIGNIFICANT CHANGE UP (ref 42–55)
PH BLDV: 7.29 — LOW (ref 7.32–7.43)
PHOSPHATE SERPL-MCNC: 2 MG/DL — LOW (ref 2.5–4.5)
PLATELET # BLD AUTO: 287 K/UL — SIGNIFICANT CHANGE UP (ref 150–400)
PLATELET # BLD AUTO: 288 K/UL — SIGNIFICANT CHANGE UP (ref 150–400)
PO2 BLDV: 25 MMHG — SIGNIFICANT CHANGE UP (ref 25–45)
POTASSIUM BLDV-SCNC: 5 MMOL/L — SIGNIFICANT CHANGE UP (ref 3.5–5.1)
POTASSIUM SERPL-MCNC: 5.2 MMOL/L — SIGNIFICANT CHANGE UP (ref 3.5–5.3)
POTASSIUM SERPL-MCNC: 5.3 MMOL/L — SIGNIFICANT CHANGE UP (ref 3.5–5.3)
POTASSIUM SERPL-SCNC: 5.2 MMOL/L — SIGNIFICANT CHANGE UP (ref 3.5–5.3)
POTASSIUM SERPL-SCNC: 5.3 MMOL/L — SIGNIFICANT CHANGE UP (ref 3.5–5.3)
RBC # BLD: 3.23 M/UL — LOW (ref 4.2–5.8)
RBC # BLD: 3.25 M/UL — LOW (ref 4.2–5.8)
RBC # FLD: 17 % — HIGH (ref 10.3–14.5)
RBC # FLD: 17.1 % — HIGH (ref 10.3–14.5)
SAO2 % BLDV: 39.8 % — LOW (ref 67–88)
SODIUM SERPL-SCNC: 137 MMOL/L — SIGNIFICANT CHANGE UP (ref 135–145)
SODIUM SERPL-SCNC: 138 MMOL/L — SIGNIFICANT CHANGE UP (ref 135–145)
WBC # BLD: 17.23 K/UL — HIGH (ref 3.8–10.5)
WBC # BLD: 17.47 K/UL — HIGH (ref 3.8–10.5)
WBC # FLD AUTO: 17.23 K/UL — HIGH (ref 3.8–10.5)
WBC # FLD AUTO: 17.47 K/UL — HIGH (ref 3.8–10.5)

## 2023-03-17 PROCEDURE — 99232 SBSQ HOSP IP/OBS MODERATE 35: CPT

## 2023-03-17 PROCEDURE — 99232 SBSQ HOSP IP/OBS MODERATE 35: CPT | Mod: GC

## 2023-03-17 PROCEDURE — 93308 TTE F-UP OR LMTD: CPT | Mod: 26

## 2023-03-17 PROCEDURE — 93321 DOPPLER ECHO F-UP/LMTD STD: CPT | Mod: 26

## 2023-03-17 RX ORDER — PANTOPRAZOLE SODIUM 20 MG/1
1 TABLET, DELAYED RELEASE ORAL
Qty: 0 | Refills: 0 | DISCHARGE
Start: 2023-03-17

## 2023-03-17 RX ORDER — APIXABAN 2.5 MG/1
1 TABLET, FILM COATED ORAL
Qty: 0 | Refills: 0 | DISCHARGE
Start: 2023-03-17

## 2023-03-17 RX ORDER — NYSTATIN 500MM UNIT
5 POWDER (EA) MISCELLANEOUS
Qty: 0 | Refills: 0 | DISCHARGE
Start: 2023-03-17

## 2023-03-17 RX ORDER — INSULIN GLARGINE 100 [IU]/ML
20 INJECTION, SOLUTION SUBCUTANEOUS AT BEDTIME
Refills: 0 | Status: DISCONTINUED | OUTPATIENT
Start: 2023-03-17 | End: 2023-03-18

## 2023-03-17 RX ORDER — VALGANCICLOVIR 450 MG/1
2 TABLET, FILM COATED ORAL
Qty: 0 | Refills: 0 | DISCHARGE

## 2023-03-17 RX ORDER — TACROLIMUS 5 MG/1
5 CAPSULE ORAL
Qty: 0 | Refills: 0 | DISCHARGE
Start: 2023-03-17

## 2023-03-17 RX ORDER — MAGNESIUM OXIDE 400 MG ORAL TABLET 241.3 MG
400 TABLET ORAL
Refills: 0 | Status: COMPLETED | OUTPATIENT
Start: 2023-03-17 | End: 2023-03-18

## 2023-03-17 RX ORDER — METOPROLOL TARTRATE 50 MG
1 TABLET ORAL
Qty: 0 | Refills: 0 | DISCHARGE

## 2023-03-17 RX ORDER — PANTOPRAZOLE SODIUM 20 MG/1
1 TABLET, DELAYED RELEASE ORAL
Qty: 0 | Refills: 0 | DISCHARGE

## 2023-03-17 RX ORDER — METOPROLOL TARTRATE 50 MG
1 TABLET ORAL
Qty: 0 | Refills: 0 | DISCHARGE
Start: 2023-03-17

## 2023-03-17 RX ORDER — VALGANCICLOVIR 450 MG/1
1 TABLET, FILM COATED ORAL
Qty: 0 | Refills: 0 | DISCHARGE
Start: 2023-03-17

## 2023-03-17 RX ORDER — LOSARTAN POTASSIUM 100 MG/1
1 TABLET, FILM COATED ORAL
Qty: 0 | Refills: 0 | DISCHARGE

## 2023-03-17 RX ORDER — FOLIC ACID 0.8 MG
1 TABLET ORAL
Qty: 0 | Refills: 0 | DISCHARGE

## 2023-03-17 RX ORDER — LOSARTAN POTASSIUM 100 MG/1
1 TABLET, FILM COATED ORAL
Qty: 0 | Refills: 0 | DISCHARGE
Start: 2023-03-17

## 2023-03-17 RX ORDER — MAGNESIUM SULFATE 500 MG/ML
1 VIAL (ML) INJECTION ONCE
Refills: 0 | Status: DISCONTINUED | OUTPATIENT
Start: 2023-03-17 | End: 2023-03-17

## 2023-03-17 RX ORDER — FOLIC ACID 0.8 MG
1 TABLET ORAL
Qty: 0 | Refills: 0 | DISCHARGE
Start: 2023-03-17

## 2023-03-17 RX ORDER — INSULIN LISPRO 100/ML
7 VIAL (ML) SUBCUTANEOUS
Refills: 0 | Status: DISCONTINUED | OUTPATIENT
Start: 2023-03-17 | End: 2023-03-18

## 2023-03-17 RX ORDER — POTASSIUM PHOSPHATE, MONOBASIC 500 MG/1
500 TABLET, SOLUBLE ORAL TWICE DAILY
Qty: 120 | Refills: 0 | Status: DISCONTINUED | COMMUNITY
Start: 2023-03-16 | End: 2023-03-17

## 2023-03-17 RX ORDER — SODIUM,POTASSIUM PHOSPHATES 278-250MG
1 POWDER IN PACKET (EA) ORAL ONCE
Refills: 0 | Status: COMPLETED | OUTPATIENT
Start: 2023-03-17 | End: 2023-03-17

## 2023-03-17 RX ADMIN — APIXABAN 5 MILLIGRAM(S): 2.5 TABLET, FILM COATED ORAL at 05:53

## 2023-03-17 RX ADMIN — Medication 500000 UNIT(S): at 12:08

## 2023-03-17 RX ADMIN — Medication 500000 UNIT(S): at 21:11

## 2023-03-17 RX ADMIN — Medication 200 MILLIGRAM(S): at 05:54

## 2023-03-17 RX ADMIN — MAGNESIUM OXIDE 400 MG ORAL TABLET 400 MILLIGRAM(S): 241.3 TABLET ORAL at 14:28

## 2023-03-17 RX ADMIN — Medication 60 MILLIGRAM(S): at 05:54

## 2023-03-17 RX ADMIN — Medication 81 MILLIGRAM(S): at 12:09

## 2023-03-17 RX ADMIN — Medication 10 UNIT(S): at 13:42

## 2023-03-17 RX ADMIN — Medication 3 MILLILITER(S): at 12:08

## 2023-03-17 RX ADMIN — MAGNESIUM OXIDE 400 MG ORAL TABLET 400 MILLIGRAM(S): 241.3 TABLET ORAL at 17:57

## 2023-03-17 RX ADMIN — Medication 1 TABLET(S): at 12:07

## 2023-03-17 RX ADMIN — Medication 200 MILLIGRAM(S): at 12:08

## 2023-03-17 RX ADMIN — APIXABAN 5 MILLIGRAM(S): 2.5 TABLET, FILM COATED ORAL at 17:57

## 2023-03-17 RX ADMIN — Medication 500000 UNIT(S): at 17:57

## 2023-03-17 RX ADMIN — Medication 500000 UNIT(S): at 08:41

## 2023-03-17 RX ADMIN — LOSARTAN POTASSIUM 25 MILLIGRAM(S): 100 TABLET, FILM COATED ORAL at 05:54

## 2023-03-17 RX ADMIN — Medication 200 MILLIGRAM(S): at 23:06

## 2023-03-17 RX ADMIN — POLYETHYLENE GLYCOL 3350 17 GRAM(S): 17 POWDER, FOR SOLUTION ORAL at 12:10

## 2023-03-17 RX ADMIN — SIMETHICONE 80 MILLIGRAM(S): 80 TABLET, CHEWABLE ORAL at 12:09

## 2023-03-17 RX ADMIN — TACROLIMUS 2.5 MILLIGRAM(S): 5 CAPSULE ORAL at 14:00

## 2023-03-17 RX ADMIN — ATORVASTATIN CALCIUM 10 MILLIGRAM(S): 80 TABLET, FILM COATED ORAL at 21:11

## 2023-03-17 RX ADMIN — Medication 1 MILLIGRAM(S): at 12:09

## 2023-03-17 RX ADMIN — VALGANCICLOVIR 450 MILLIGRAM(S): 450 TABLET, FILM COATED ORAL at 12:10

## 2023-03-17 RX ADMIN — Medication 1 PACKET(S): at 14:00

## 2023-03-17 RX ADMIN — Medication 7 UNIT(S): at 17:57

## 2023-03-17 RX ADMIN — Medication 26 UNIT(S): at 08:42

## 2023-03-17 RX ADMIN — PANTOPRAZOLE SODIUM 40 MILLIGRAM(S): 20 TABLET, DELAYED RELEASE ORAL at 05:54

## 2023-03-17 RX ADMIN — SENNA PLUS 1 TABLET(S): 8.6 TABLET ORAL at 12:10

## 2023-03-17 RX ADMIN — Medication 200 MILLIGRAM(S): at 17:57

## 2023-03-17 RX ADMIN — Medication 3 MILLILITER(S): at 23:06

## 2023-03-17 RX ADMIN — Medication 200 MILLIGRAM(S): at 05:55

## 2023-03-17 RX ADMIN — INSULIN GLARGINE 20 UNIT(S): 100 INJECTION, SOLUTION SUBCUTANEOUS at 22:34

## 2023-03-17 RX ADMIN — TACROLIMUS 2.5 MILLIGRAM(S): 5 CAPSULE ORAL at 22:34

## 2023-03-17 NOTE — PROGRESS NOTE ADULT - ASSESSMENT
71 year old M with a PMH of HTN, NICM c/b HFrEF s/p LVAD 2017, and then subsequently s/p heart transplant from HCV donor (treated) in 2018 with post-op course complicated by graft dysfunction, who initially presented to the ED with cough and chest tightness.     Cough  Afebrile, but with leukocytosis  RVP + for hMPV   CT with impacted RML and RLL bronchi, and patchy consolidations in RUL and LLL  Initially on Vancomycin and Cefepime   Legionella Ur Ag negative, serum Cryptococcal Ag negative, EBV IgM negative  UA negative  Blood cultures from March 3rd with 1/4 bottles with Micrococcus Luteus   Fungitell 300  Procalcitonin 2.39, Cr 2->3 (NORMAN)  On Valcyte and Bactrim prophylaxis     OVERALL  hMPV pneumonia in heart transplant/immunocompromised recipient   R/o superimposed bacterial infection- work up negative to date    Elevated Fungitell   1/4 BCx bottles with Micrococcus   [facility], s/p heart transplant 2018  H/o HCV from donor s/p treatment   H/o C Diff in 2020     Treated with broad spectrum abx from 3/3 to 3/8 (zosyn then cefepime)  Non toxic appearing      DVTs- bilateral  may account for his leukocytosis  blood cultures sent 3/14 and negative    Would reduce steroid dosing from 60mg to?40mg a day and try to reduce Tacro target trough to decrease immunosuppression      Case and plan d/w hospitalist    Gary Lujan MD  Can be called via Teams  After 5pm/weekends [phone]     71 year old M with a PMH of HTN, NICM c/b HFrEF s/p LVAD 2017, and then subsequently s/p heart transplant from HCV donor (treated) in 2018 with post-op course complicated by graft dysfunction, who initially presented to the ED with cough and chest tightness.     Cough  Afebrile, but with leukocytosis  RVP + for hMPV   CT with impacted RML and RLL bronchi, and patchy consolidations in RUL and LLL  Initially on Vancomycin and Cefepime   Legionella Ur Ag negative, serum Cryptococcal Ag negative, EBV IgM negative  UA negative  Blood cultures from March 3rd with 1/4 bottles with Micrococcus Luteus   Fungitell 300  Procalcitonin 2.39, Cr 2->3 (NORMAN)  On Valcyte and Bactrim prophylaxis     OVERALL  hMPV pneumonia in heart transplant/immunocompromised recipient   R/o superimposed bacterial infection- work up negative to date    Elevated Fungitell   1/4 BCx bottles with Micrococcus   [facility], s/p heart transplant 2018  H/o HCV from donor s/p treatment   H/o C Diff in 2020     Treated with broad spectrum abx from 3/3 to 3/8 (zosyn then cefepime)  Non toxic appearing      DVTs- bilateral  may account for his leukocytosis  blood cultures sent 3/14 and negative    Would reduce steroid dosing from 60mg to?40mg a day and try to reduce Tacro target trough to decrease immunosuppression    can follow up in the office in 2-3 weeks      Case and plan d/w hospitalist    Gary Lujan MD  Can be called via Teams  After 5pm/weekends [phone]

## 2023-03-17 NOTE — PROGRESS NOTE ADULT - PROBLEM SELECTOR PLAN 2
- pulm consult for bronch but not indicated per them  - continue home bactrim, valganciclovir, antifungal ppx  - Tacrolimus 2.5 on 3/16, goal 6-8, (obtain tacro level 30min prior to administration in AM). Labs drawn late today due to issue with drawing them, check Tacro level in AM  - Continue Tacrolimus 2.5mg BID for now  - f/u transplant ID recs.

## 2023-03-17 NOTE — PROGRESS NOTE ADULT - SUBJECTIVE AND OBJECTIVE BOX
INFECTIOUS DISEASES FOLLOW UP-- Sujey Lujan  [phone]    This is a follow up note for this  72yMale with  Pulmonary atelectasis  human mwetapneumovirus        ROS:  CONSTITUTIONAL:  No fever, good appetite  CARDIOVASCULAR:  No chest pain or palpitations  RESPIRATORY:  No dyspnea  GASTROINTESTINAL:  No nausea, vomiting, diarrhea, or abdominal pain  GENITOURINARY:  No dysuria  NEUROLOGIC:  No headache,     Allergies    No Known Allergies    Intolerances        ANTIBIOTICS/RELEVANT:  antimicrobials  nystatin    Suspension 726368 Unit(s) Oral <User Schedule>  trimethoprim   80 mG/sulfamethoxazole 400 mG 1 Tablet(s) Oral every 24 hours  valGANciclovir 450 milliGRAM(s) Oral daily    immunologic:  tacrolimus 2.5 milliGRAM(s) Oral <User Schedule>    OTHER:  acetaminophen     Tablet .. 650 milliGRAM(s) Oral every 6 hours PRN  albuterol/ipratropium for Nebulization 3 milliLiter(s) Nebulizer every 6 hours  apixaban 5 milliGRAM(s) Oral every 12 hours  aspirin enteric coated 81 milliGRAM(s) Oral daily  atorvastatin 10 milliGRAM(s) Oral at bedtime  dextrose 5%. 1000 milliLiter(s) IV Continuous <Continuous>  dextrose 5%. 1000 milliLiter(s) IV Continuous <Continuous>  dextrose 50% Injectable 25 Gram(s) IV Push once  dextrose 50% Injectable 12.5 Gram(s) IV Push once  dextrose 50% Injectable 25 Gram(s) IV Push once  dextrose Oral Gel 15 Gram(s) Oral once  folic acid 1 milliGRAM(s) Oral daily  glucagon  Injectable 1 milliGRAM(s) IntraMuscular once  guaiFENesin Oral Liquid (Sugar-Free) 200 milliGRAM(s) Oral every 6 hours  insulin glargine Injectable (LANTUS) 20 Unit(s) SubCutaneous at bedtime  insulin lispro (ADMELOG) corrective regimen sliding scale   SubCutaneous three times a day before meals  insulin lispro (ADMELOG) corrective regimen sliding scale   SubCutaneous <User Schedule>  insulin lispro Injectable (ADMELOG) 7 Unit(s) SubCutaneous before dinner  insulin lispro Injectable (ADMELOG) 26 Unit(s) SubCutaneous before breakfast  insulin lispro Injectable (ADMELOG) 10 Unit(s) SubCutaneous before lunch  losartan 25 milliGRAM(s) Oral daily  magnesium oxide 400 milliGRAM(s) Oral three times a day with meals  melatonin 3 milliGRAM(s) Oral at bedtime PRN  metoprolol succinate  milliGRAM(s) Oral daily  pantoprazole    Tablet 40 milliGRAM(s) Oral before breakfast  polyethylene glycol 3350 17 Gram(s) Oral daily  predniSONE   Tablet 60 milliGRAM(s) Oral daily  senna 1 Tablet(s) Oral daily  simethicone 80 milliGRAM(s) Chew daily  sodium chloride 3%  Inhalation 4 milliLiter(s) Inhalation every 12 hours      Objective:  Vital Signs Last 24 Hrs  T(C): 36.3 (17 Mar 2023 11:42), Max: 36.8 (16 Mar 2023 21:05)  T(F): 97.3 (17 Mar 2023 11:42), Max: 98.3 (16 Mar 2023 21:05)  HR: 99 (17 Mar 2023 11:42) (95 - 106)  BP: 118/83 (17 Mar 2023 11:42) (118/83 - 140/96)  BP(mean): 94 (17 Mar 2023 11:42) (94 - 94)  RR: 18 (17 Mar 2023 11:42) (18 - 18)  SpO2: 100% (17 Mar 2023 11:42) (97% - 100%)    Parameters below as of 17 Mar 2023 11:42  Patient On (Oxygen Delivery Method): room air        PHYSICAL EXAM:  Constitutional:no acute distress  Eyes:WALT, EOMI  Ear/Nose/Throat: no oral lesions, 	  Respiratory: clear BL  Cardiovascular: S1S2  Gastrointestinal:soft, (+) BS, no tenderness  Extremities:no e/e/c  No Lymphadenopathy  IV sites not inflammed.    LABS:                        10.6   17.23 )-----------( 287      ( 17 Mar 2023 12:03 )             35.1     03-17    138  |  103  |  34<H>  ----------------------------<  154<H>  5.2   |  23  |  1.58<H>    Ca    7.3<L>      17 Mar 2023 12:03  Phos  2.0     03-17  Mg     1.4     03-17    TPro  5.5<L>  /  Alb  3.2<L>  /  TBili  0.5  /  DBili  x   /  AST  25  /  ALT  25  /  AlkPhos  81  03-16          MICROBIOLOGY:            RECENT CULTURES:  03-13 @ 09:06  .Blood Blood-Peripheral  --  --  --    No growth to date.  --  03-12 @ 17:54  .Sputum Sputum  --  --  --    Normal Respiratory Heaven present  --  03-12 @ 17:13  Clean Catch Clean Catch (Midstream)  --  --  --    No growth  --      RADIOLOGY & ADDITIONAL STUDIES:    < from: [facility] Duplex Lower Ext Vein Scan, Sindhu (03.15.23 @ 13:34) >    IMPRESSION:    There is acute below the knee DVT affecting the right soleal and the left   soleal and gastrocnemius veins.    < end of copied text >

## 2023-03-17 NOTE — PROGRESS NOTE ADULT - SUBJECTIVE AND OBJECTIVE BOX
seen earlier today     Chief Complaint: Type 2 Diabetes Mellitus     INTERVAL HX: reports tolerating po, observed to be eating mary katekaren mixe cookies, reports his BG was just checked prior to eating the cookies. recounseled pt about additional carbs & effect on BG pt not receptive to education.       Review of Systems:  General: As above  Cardiovascular: No chest pain  Respiratory: No SOB  GI: No nausea, vomiting  Endocrine: no  S&Sx of hypoglycemia    Allergies    No Known Allergies    Intolerances      MEDICATIONS  (STANDING):  albuterol/ipratropium for Nebulization 3 milliLiter(s) Nebulizer every 6 hours  apixaban 5 milliGRAM(s) Oral every 12 hours  aspirin enteric coated 81 milliGRAM(s) Oral daily  atorvastatin 10 milliGRAM(s) Oral at bedtime  dextrose 5%. 1000 milliLiter(s) (50 mL/Hr) IV Continuous <Continuous>  dextrose 5%. 1000 milliLiter(s) (100 mL/Hr) IV Continuous <Continuous>  dextrose 50% Injectable 25 Gram(s) IV Push once  dextrose 50% Injectable 12.5 Gram(s) IV Push once  dextrose 50% Injectable 25 Gram(s) IV Push once  dextrose Oral Gel 15 Gram(s) Oral once  folic acid 1 milliGRAM(s) Oral daily  glucagon  Injectable 1 milliGRAM(s) IntraMuscular once  guaiFENesin Oral Liquid (Sugar-Free) 200 milliGRAM(s) Oral every 6 hours  insulin glargine Injectable (LANTUS) 20 Unit(s) SubCutaneous at bedtime  insulin lispro (ADMELOG) corrective regimen sliding scale   SubCutaneous three times a day before meals  insulin lispro (ADMELOG) corrective regimen sliding scale   SubCutaneous <User Schedule>  insulin lispro Injectable (ADMELOG) 26 Unit(s) SubCutaneous before breakfast  insulin lispro Injectable (ADMELOG) 8 Unit(s) SubCutaneous before dinner  insulin lispro Injectable (ADMELOG) 10 Unit(s) SubCutaneous before lunch  losartan 25 milliGRAM(s) Oral daily  magnesium oxide 400 milliGRAM(s) Oral three times a day with meals  metoprolol succinate  milliGRAM(s) Oral daily  nystatin    Suspension 403854 Unit(s) Oral <User Schedule>  pantoprazole    Tablet 40 milliGRAM(s) Oral before breakfast  polyethylene glycol 3350 17 Gram(s) Oral daily  potassium phosphate / sodium phosphate Powder (PHOS-NaK) 1 Packet(s) Oral once  predniSONE   Tablet 60 milliGRAM(s) Oral daily  senna 1 Tablet(s) Oral daily  simethicone 80 milliGRAM(s) Chew daily  sodium chloride 3%  Inhalation 4 milliLiter(s) Inhalation every 12 hours  tacrolimus 2.5 milliGRAM(s) Oral <User Schedule>  trimethoprim   80 mG/sulfamethoxazole 400 mG 1 Tablet(s) Oral every 24 hours  valGANciclovir 450 milliGRAM(s) Oral daily      atorvastatin   10 milliGRAM(s) Oral (03-16-23 @ 22:01)    insulin glargine Injectable (LANTUS)   22 Unit(s) SubCutaneous (03-16-23 @ 22:01)    insulin lispro (ADMELOG) corrective regimen sliding scale   2 Unit(s) SubCutaneous (03-16-23 @ 17:58)    insulin lispro Injectable (ADMELOG)   26 Unit(s) SubCutaneous (03-17-23 @ 08:42)    insulin lispro Injectable (ADMELOG)   8 Unit(s) SubCutaneous (03-16-23 @ 17:58)    insulin lispro Injectable (ADMELOG)   10 Unit(s) SubCutaneous (03-17-23 @ 13:42)    predniSONE   Tablet   60 milliGRAM(s) Oral (03-17-23 @ 05:54)        PHYSICAL EXAM:  VITALS: T(C): 36.3 (03-17-23 @ 11:42)  T(F): 97.3 (03-17-23 @ 11:42), Max: 98.3 (03-16-23 @ 21:05)  HR: 99 (03-17-23 @ 11:42) (95 - 106)  BP: 118/83 (03-17-23 @ 11:42) (118/83 - 140/96)  RR:  (18 - 18)  SpO2:  (97% - 100%)  Wt(kg): --  GENERAL: male sitting in chair   in NAD  Respiratory: Respirations unlabored   Extremities: Warm, no edema  NEURO: Alert , appropriate     LABS:  POCT Blood Glucose.: 146 mg/dL (03-17-23 @ 12:48)  POCT Blood Glucose.: 126 mg/dL (03-17-23 @ 08:32)  POCT Blood Glucose.: 99 mg/dL (03-17-23 @ 03:58)  POCT Blood Glucose.: 107 mg/dL (03-16-23 @ 21:36)  POCT Blood Glucose.: 183 mg/dL (03-16-23 @ 17:24)  POCT Blood Glucose.: 255 mg/dL (03-16-23 @ 13:16)  POCT Blood Glucose.: 160 mg/dL (03-16-23 @ 09:07)  POCT Blood Glucose.: 248 mg/dL (03-16-23 @ 03:07)  POCT Blood Glucose.: 134 mg/dL (03-15-23 @ 23:24)  POCT Blood Glucose.: 71 mg/dL (03-15-23 @ 22:23)  POCT Blood Glucose.: 75 mg/dL (03-15-23 @ 21:43)  POCT Blood Glucose.: 269 mg/dL (03-15-23 @ 17:17)  POCT Blood Glucose.: 457 mg/dL (03-15-23 @ 13:36)  POCT Blood Glucose.: 445 mg/dL (03-15-23 @ 13:34)  POCT Blood Glucose.: 191 mg/dL (03-15-23 @ 09:02)  POCT Blood Glucose.: 158 mg/dL (03-15-23 @ 02:04)  POCT Blood Glucose.: 176 mg/dL (03-14-23 @ 21:23)  POCT Blood Glucose.: 263 mg/dL (03-14-23 @ 17:20)                          10.6   17.23 )-----------( 287      ( 17 Mar 2023 12:03 )             35.1     03-17    138  |  103  |  34<H>  ----------------------------<  154<H>  5.2   |  23  |  1.58<H>    Ca    7.3<L>      17 Mar 2023 12:03  Phos  2.0     03-17  Mg     1.4     03-17    TPro  5.5<L>  /  Alb  3.2<L>  /  TBili  0.5  /  DBili  x   /  AST  25  /  ALT  25  /  AlkPhos  81  03-16    eGFR: 46 mL/min/1.73m2 (17 Mar 2023 12:03)  eGFR: 48 mL/min/1.73m2 (17 Mar 2023 12:03)    02-17 Chol 143 Direct LDL -- LDL calculated 51 HDL 69 Trig 116  Thyroid Function Tests:  03-03 @ 15:09 TSH 1.06 FreeT4 -- T3 -- Anti TPO -- Anti Thyroglobulin Ab -- TSI --      A1C with Estimated Average Glucose Result: 4.7 % (02-17-23 @ 11:59)    Estimated Average Glucose: 88 mg/dL (02-17-23 @ 11:59)        Diet, Regular:   Consistent Carbohydrate No Snacks (CSTCHO)  DASH/TLC Sodium & Cholesterol Restricted (DASH) (03-04-23 @ 11:54) [Active]

## 2023-03-17 NOTE — PROGRESS NOTE ADULT - REASON FOR ADMISSION
a flutter
s/p heart transplant
a flutter
Chest tightness, found to have Aflutter with RVR
s/p heart transplant
SOB
flutter
fevers
Aflutter 2/2 PNA
a flutter
a flutter
Aflutter
a flutter
a flutter
chest tightness
a flutter
chest tightness
a flutter
Atrial flutter

## 2023-03-17 NOTE — PROGRESS NOTE ADULT - ATTENDING COMMENTS
Patient seen and examined at bedside. He overall looks well.   - WBC elevated likely due to DVT, ruled out for infectious etiologies. Appreciate ID recs  - Will adjust tac dose, based on the level (goal 6-8)  - likely discharge tomorrow

## 2023-03-17 NOTE — PROGRESS NOTE ADULT - PROBLEM SELECTOR PLAN 1
Patient with persistent leukocytosis found to have bilateral below the knee DVTs on duplex imaging    Plan:   >IVF bolus to clear mildly elevated lactate (likely secondary to duonebs/DVT)  >ctm WBC on CBC

## 2023-03-17 NOTE — PROGRESS NOTE ADULT - ATTENDING SUPERVISION STATEMENT
Fellow
Resident

## 2023-03-17 NOTE — PROGRESS NOTE ADULT - SUBJECTIVE AND OBJECTIVE BOX
Subjective:  No acute events.     Medications:  acetaminophen     Tablet .. 650 milliGRAM(s) Oral every 6 hours PRN  albuterol/ipratropium for Nebulization 3 milliLiter(s) Nebulizer every 6 hours  apixaban 5 milliGRAM(s) Oral every 12 hours  aspirin enteric coated 81 milliGRAM(s) Oral daily  atorvastatin 10 milliGRAM(s) Oral at bedtime  dextrose 5%. 1000 milliLiter(s) IV Continuous <Continuous>  dextrose 5%. 1000 milliLiter(s) IV Continuous <Continuous>  dextrose 50% Injectable 25 Gram(s) IV Push once  dextrose 50% Injectable 12.5 Gram(s) IV Push once  dextrose 50% Injectable 25 Gram(s) IV Push once  dextrose Oral Gel 15 Gram(s) Oral once  folic acid 1 milliGRAM(s) Oral daily  glucagon  Injectable 1 milliGRAM(s) IntraMuscular once  guaiFENesin Oral Liquid (Sugar-Free) 200 milliGRAM(s) Oral every 6 hours  insulin glargine Injectable (LANTUS) 22 Unit(s) SubCutaneous at bedtime  insulin lispro (ADMELOG) corrective regimen sliding scale   SubCutaneous three times a day before meals  insulin lispro (ADMELOG) corrective regimen sliding scale   SubCutaneous <User Schedule>  insulin lispro Injectable (ADMELOG) 26 Unit(s) SubCutaneous before breakfast  insulin lispro Injectable (ADMELOG) 8 Unit(s) SubCutaneous before dinner  insulin lispro Injectable (ADMELOG) 10 Unit(s) SubCutaneous before lunch  losartan 25 milliGRAM(s) Oral daily  melatonin 3 milliGRAM(s) Oral at bedtime PRN  metoprolol succinate  milliGRAM(s) Oral daily  nystatin    Suspension 020498 Unit(s) Oral <User Schedule>  pantoprazole    Tablet 40 milliGRAM(s) Oral before breakfast  polyethylene glycol 3350 17 Gram(s) Oral daily  predniSONE   Tablet 60 milliGRAM(s) Oral daily  senna 1 Tablet(s) Oral daily  simethicone 80 milliGRAM(s) Chew daily  sodium chloride 3%  Inhalation 4 milliLiter(s) Inhalation every 12 hours  tacrolimus 2.5 milliGRAM(s) Oral <User Schedule>  trimethoprim   80 mG/sulfamethoxazole 400 mG 1 Tablet(s) Oral every 24 hours  valGANciclovir 450 milliGRAM(s) Oral daily    Vitals:  T(C): 36.6 (03-17-23 @ 05:40), Max: 36.8 (03-16-23 @ 21:05)  HR: 95 (03-17-23 @ 05:40) (95 - 106)  BP: 140/96 (03-17-23 @ 05:40) (125/82 - 140/96)  RR: 18 (03-17-23 @ 05:40) (18 - 18)  SpO2: 98% (03-17-23 @ 05:40) (97% - 98%)      I&O's Summary    16 Mar 2023 07:01  -  17 Mar 2023 07:00  --------------------------------------------------------  IN: 320 mL / OUT: 0 mL / NET: 320 mL        Physical Exam:  Appearance: No acute distress; well appearing  Eyes: no scleral icterus   HEENT: Normal oral mucosa  Cardiovascular: RRR, S1, S2, no murmurs, rubs, or gallops; no edema  Respiratory: No respiratory distress, no auditory stridor    Gastrointestinal: Nondistended, appears soft  Musculoskeletal: No clubbing; no joint deformity   Neurologic: Moving all 4 extremities spontaneously  Psychiatry: Not willing to answer any questions  Skin: No rashes, ecchymoses, or cyanosis      Labs:                        10.6   17.23 )-----------( 287      ( 17 Mar 2023 12:03 )             35.1     03-16    140  |  107  |  39<H>  ----------------------------<  194<H>  4.3   |  21<L>  |  1.56<H>    Ca    6.7<L>      16 Mar 2023 06:45  Phos  1.8     03-16  Mg     1.6     03-16    TPro  5.5<L>  /  Alb  3.2<L>  /  TBili  0.5  /  DBili  x   /  AST  25  /  ALT  25  /  AlkPhos  81  03-16        Tacrolimus (), Serum: 2.5 ng/mL (03-16-23 @ 06:45)  Tacrolimus (), Serum: 2.1 ng/mL (03-15-23 @ 11:09)  Tacrolimus (), Serum: 9.9 ng/mL (03-14-23 @ 11:38)  Tacrolimus (), Serum: 2.4 ng/mL (03-12-23 @ 11:55)  Tacrolimus (), Serum: 2.6 ng/mL (03-11-23 @ 10:55)

## 2023-03-17 NOTE — PROGRESS NOTE ADULT - PROBLEM SELECTOR PLAN 4
Meeting sepsis criteria w/ tachycardia to 140s, RR 24, oral temp 100.1 --> likely higher rectal temp, w/ PNA as possible source given symptoms, CT chest findings.   - treatment of PNA as above: vanc and cefepime  - f/u transplant ID recs  - Steroids decreased to 60 mg per ID and Heme Onc reccomendation  - Prelim BCX positive for gram positive cocci in pairs, f/u final cultures  - fungitell > 300; unlikely PJP given on ppx and on room air Meeting sepsis criteria w/ tachycardia to 140s, RR 24, oral temp 100.1 --> likely higher rectal temp, w/ PNA as possible source given symptoms, CT chest findings.   - treatment of PNA as above: vanc and cefepime  lactate still elevated 3/17 at 2.9   - f/u transplant ID recs>> f/u repeat TTE   - Steroids decreased to 60 mg per ID and Heme Onc reccomendation>> be discharged on this dose   - Prelim BCX positive for gram positive cocci in pairs, f/u final cultures  - fungitell > 300; unlikely PJP given on ppx and on room air

## 2023-03-17 NOTE — PROGRESS NOTE ADULT - PROBLEM SELECTOR PLAN 2
P/w chest tightness x1d associated w/ dry cough; likely multifactorial in s/o atrial flutter w/ HR 140s, notes that his chest tightness is improving as HR is lowered. Also considering contribution from CT chest findings of impacted R middle and lower lobe bronchi w/ near collapse of R middle/lower lobes.   - RVP positive for human metapneumovirus   - Procal elevated to 2.39; neutrophilic predominance on diff with 1% bands in immunosuppresed   - s/p abx: cefepime 1g q24h (3/4 - 2/8 ) --> adjusted for NORMAN  -EBV negative for active infection, MRSA negative  - fungitell > 300   - repeat BCx NGTD  - treatment of aflutter as below  -CT C/A/P negative  - Transplant ID following; f/u recs  - c/w ppx of bactrim and valcyclovir (redosed given improved Cr Cl)

## 2023-03-17 NOTE — PROGRESS NOTE ADULT - PROBLEM SELECTOR PLAN 1
- s/p OHT in 2/23/18  - continue home Toprol  mg PO QD; losartan on hold d/t renal dysfunction   - continue home ASA 81 and  atorvastatin 10 mg PO QD  - continue home pantoprazole 40 mg PO QD    - was planned for repeat EMBx but unlikely rejection so defer  - d/c diltiazem as tachycardia is common post-transplant.  - Elevated Lactate, asymptomatic. Give 500cc IVF bolus and recheck.  - Repeat TTE

## 2023-03-17 NOTE — PROGRESS NOTE ADULT - ASSESSMENT
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 He is now back in sinus tachycardia. Course c/b by URI w/ human metapneumovirus. Would assess for superimposed infection given immunosuppression. Afib/flutter would be concerning for rejection, but repeat echo here with normal LV function. Also noted to have acute on chronic kidney injury likely 2/2 hypovolemia. Now with worsening leukocytosis with CT chest c/f PNA.     Cardiac studies:  TTE 3/3/23: LVIDd 3.6, EF 50-55%, concentric remodeling     Relevant Labs:  Tacrolimus 2.1 on 3/15, 9.9 on 3/14

## 2023-03-17 NOTE — PROGRESS NOTE ADULT - ATTENDING COMMENTS
above plans discussed with Dr. Jackson    # hMPV PNA  # leukocytosis  # NORMAN  # Aflutter with RVR  # AIHA on high dose steroids  # steroid induced hyperglycemia  # s/p heart transplant  # hx of C.diff colitis    - leukocytosis now stabilized at ~17, continues to have no localizing symptoms and hemodynamically stable  - LE doppler (+) with bilateral below knee DVTs  - DVTs could explain his leukocytosis/elevated lactate damien given lack of localizing symptoms and negative CT findings  - prednisone dose reduced to 60mg daily  - FS now better controlled on current regimen  - given low tacro level, dose increased  - appreciate ID recs: care discussed with Dr. Lujan - low suspicion for infectious etiologies  - appreciate transplant cards: low suspicion for rejection; no indication for EMBx at this time; care discussed with Radha  - appreciate interdisciplinary team recommendations from transplant cards, ID, endo    * dc planning tmrw    Brittany Trevizo MD  Division of Hospital Medicine  Contact via Microsoft Teams  Office: [phone]

## 2023-03-17 NOTE — PROGRESS NOTE ADULT - ASSESSMENT
73 y/o male with hx of nonischemic cardiomyopathy s/p HM2 LVAD, underwent OHT 2/23/18 with hepatitis C donor, hx of hemolytic anemia presented w/ 1d chest tightness, found to have new onset aflutter w/ RVR, Patient found to have human metapneumovirus with likely superimposed bacterial infection given elevated procal and CT findings of RLL and RML collapse. Course c/b elevated fungitell and persistently high white count found to have bilateral DVTs.

## 2023-03-17 NOTE — PROGRESS NOTE ADULT - TIME BILLING
coordinating care
- Generation of cardiovascular treatment plan.  - Coordination of care with primary team.
- Ordering, reviewing, and interpreting labs, testing, and imaging.  - Independently obtaining a review of systems and performing a physical exam  - Reviewing consultant documentation/recommendations in addition to discussing plan of care with consultants.  - Counselling and educating patient and family regarding interpretation of aforementioned items and plan of care.

## 2023-03-17 NOTE — PROGRESS NOTE ADULT - SUBJECTIVE AND OBJECTIVE BOX
PROGRESS NOTE:     Patient is a 72y old  Male who presents with a chief complaint of Chest tightness, found to have Aflutter with RVR (16 Mar 2023 16:34)      SUBJECTIVE / OVERNIGHT EVENTS:    ADDITIONAL REVIEW OF SYSTEMS:    MEDICATIONS  (STANDING):  albuterol/ipratropium for Nebulization 3 milliLiter(s) Nebulizer every 6 hours  apixaban 5 milliGRAM(s) Oral every 12 hours  aspirin enteric coated 81 milliGRAM(s) Oral daily  atorvastatin 10 milliGRAM(s) Oral at bedtime  dextrose 5%. 1000 milliLiter(s) (50 mL/Hr) IV Continuous <Continuous>  dextrose 5%. 1000 milliLiter(s) (100 mL/Hr) IV Continuous <Continuous>  dextrose 50% Injectable 25 Gram(s) IV Push once  dextrose 50% Injectable 12.5 Gram(s) IV Push once  dextrose 50% Injectable 25 Gram(s) IV Push once  dextrose Oral Gel 15 Gram(s) Oral once  folic acid 1 milliGRAM(s) Oral daily  glucagon  Injectable 1 milliGRAM(s) IntraMuscular once  guaiFENesin Oral Liquid (Sugar-Free) 200 milliGRAM(s) Oral every 6 hours  insulin glargine Injectable (LANTUS) 22 Unit(s) SubCutaneous at bedtime  insulin lispro (ADMELOG) corrective regimen sliding scale   SubCutaneous <User Schedule>  insulin lispro (ADMELOG) corrective regimen sliding scale   SubCutaneous three times a day before meals  insulin lispro Injectable (ADMELOG) 26 Unit(s) SubCutaneous before breakfast  insulin lispro Injectable (ADMELOG) 8 Unit(s) SubCutaneous before dinner  insulin lispro Injectable (ADMELOG) 10 Unit(s) SubCutaneous before lunch  losartan 25 milliGRAM(s) Oral daily  metoprolol succinate  milliGRAM(s) Oral daily  nystatin    Suspension 790693 Unit(s) Oral <User Schedule>  pantoprazole    Tablet 40 milliGRAM(s) Oral before breakfast  polyethylene glycol 3350 17 Gram(s) Oral daily  predniSONE   Tablet 60 milliGRAM(s) Oral daily  senna 1 Tablet(s) Oral daily  simethicone 80 milliGRAM(s) Chew daily  sodium chloride 3%  Inhalation 4 milliLiter(s) Inhalation every 12 hours  tacrolimus 2.5 milliGRAM(s) Oral <User Schedule>  trimethoprim   80 mG/sulfamethoxazole 400 mG 1 Tablet(s) Oral every 24 hours  valGANciclovir 450 milliGRAM(s) Oral daily    MEDICATIONS  (PRN):  acetaminophen     Tablet .. 650 milliGRAM(s) Oral every 6 hours PRN Temp greater or equal to 38C (100.4F), Mild Pain (1 - 3)  melatonin 3 milliGRAM(s) Oral at bedtime PRN Insomnia      CAPILLARY BLOOD GLUCOSE      POCT Blood Glucose.: 99 mg/dL (17 Mar 2023 03:58)  POCT Blood Glucose.: 107 mg/dL (16 Mar 2023 21:36)  POCT Blood Glucose.: 183 mg/dL (16 Mar 2023 17:24)  POCT Blood Glucose.: 255 mg/dL (16 Mar 2023 13:16)  POCT Blood Glucose.: 160 mg/dL (16 Mar 2023 09:07)    I&O's Summary    16 Mar 2023 07:01  -  17 Mar 2023 07:00  --------------------------------------------------------  IN: 320 mL / OUT: 0 mL / NET: 320 mL        PHYSICAL EXAM:  Vital Signs Last 24 Hrs  T(C): 36.6 (17 Mar 2023 05:40), Max: 36.8 (16 Mar 2023 21:05)  T(F): 97.8 (17 Mar 2023 05:40), Max: 98.3 (16 Mar 2023 21:05)  HR: 95 (17 Mar 2023 05:40) (95 - 106)  BP: 140/96 (17 Mar 2023 05:40) (118/82 - 140/96)  BP(mean): 94 (16 Mar 2023 11:10) (94 - 94)  RR: 18 (17 Mar 2023 05:40) (18 - 18)  SpO2: 98% (17 Mar 2023 05:40) (97% - 100%)    Parameters below as of 17 Mar 2023 05:40  Patient On (Oxygen Delivery Method): room air        GENERAL: No acute distress, well-developed  HEAD:  Atraumatic, Normocephalic  EYES: EOMI, PERRLA, conjunctiva and sclera clear  NECK: Supple, no lymphadenopathy, no JVD  CHEST/LUNG: CTAB; No wheezes, rales, or rhonchi  HEART: Regular rate and rhythm; No murmurs, rubs, or gallops  ABDOMEN: Soft, non-tender, non-distended; normal bowel sounds, no organomegaly  EXTREMITIES:  2+ peripheral pulses b/l, No clubbing, cyanosis, or edema  NEUROLOGY: A&O x 3, no focal deficits  SKIN: No rashes or lesions    LABS:                        10.6   17.59 )-----------( 303      ( 16 Mar 2023 06:45 )             34.5     03-16    140  |  107  |  39<H>  ----------------------------<  194<H>  4.3   |  21<L>  |  1.56<H>    Ca    6.7<L>      16 Mar 2023 06:45  Phos  1.8     03-16  Mg     1.6     03-16    TPro  5.5<L>  /  Alb  3.2<L>  /  TBili  0.5  /  DBili  x   /  AST  25  /  ALT  25  /  AlkPhos  81  03-16                RADIOLOGY & ADDITIONAL TESTS:  Results Reviewed:   Imaging Personally Reviewed:  Electrocardiogram Personally Reviewed:    COORDINATION OF CARE:  Care Discussed with Consultants/Other Providers [Y/N]:  Prior or Outpatient Records Reviewed [Y/N]:   PROGRESS NOTE:     Patient is a 72y old  Male who presents with a chief complaint of Chest tightness, found to have Aflutter with RVR (16 Mar 2023 16:34)      SUBJECTIVE / OVERNIGHT EVENTS:  No events overnight. Patient examined at bedside this AM. Feels well- all ROS negative.     ADDITIONAL REVIEW OF SYSTEMS:    MEDICATIONS  (STANDING):  albuterol/ipratropium for Nebulization 3 milliLiter(s) Nebulizer every 6 hours  apixaban 5 milliGRAM(s) Oral every 12 hours  aspirin enteric coated 81 milliGRAM(s) Oral daily  atorvastatin 10 milliGRAM(s) Oral at bedtime  dextrose 5%. 1000 milliLiter(s) (50 mL/Hr) IV Continuous <Continuous>  dextrose 5%. 1000 milliLiter(s) (100 mL/Hr) IV Continuous <Continuous>  dextrose 50% Injectable 25 Gram(s) IV Push once  dextrose 50% Injectable 12.5 Gram(s) IV Push once  dextrose 50% Injectable 25 Gram(s) IV Push once  dextrose Oral Gel 15 Gram(s) Oral once  folic acid 1 milliGRAM(s) Oral daily  glucagon  Injectable 1 milliGRAM(s) IntraMuscular once  guaiFENesin Oral Liquid (Sugar-Free) 200 milliGRAM(s) Oral every 6 hours  insulin glargine Injectable (LANTUS) 22 Unit(s) SubCutaneous at bedtime  insulin lispro (ADMELOG) corrective regimen sliding scale   SubCutaneous <User Schedule>  insulin lispro (ADMELOG) corrective regimen sliding scale   SubCutaneous three times a day before meals  insulin lispro Injectable (ADMELOG) 26 Unit(s) SubCutaneous before breakfast  insulin lispro Injectable (ADMELOG) 8 Unit(s) SubCutaneous before dinner  insulin lispro Injectable (ADMELOG) 10 Unit(s) SubCutaneous before lunch  losartan 25 milliGRAM(s) Oral daily  metoprolol succinate  milliGRAM(s) Oral daily  nystatin    Suspension [id number] Unit(s) Oral <User Schedule>  pantoprazole    Tablet 40 milliGRAM(s) Oral before breakfast  polyethylene glycol 3350 17 Gram(s) Oral daily  predniSONE   Tablet 60 milliGRAM(s) Oral daily  senna 1 Tablet(s) Oral daily  simethicone 80 milliGRAM(s) Chew daily  sodium chloride 3%  Inhalation 4 milliLiter(s) Inhalation every 12 hours  tacrolimus 2.5 milliGRAM(s) Oral <User Schedule>  trimethoprim   80 mG/sulfamethoxazole 400 mG 1 Tablet(s) Oral every 24 hours  valGANciclovir 450 milliGRAM(s) Oral daily    MEDICATIONS  (PRN):  acetaminophen     Tablet .. 650 milliGRAM(s) Oral every 6 hours PRN Temp greater or equal to 38C (100.4F), Mild Pain (1 - 3)  melatonin 3 milliGRAM(s) Oral at bedtime PRN Insomnia      CAPILLARY BLOOD GLUCOSE      POCT Blood Glucose.: 99 mg/dL (17 Mar 2023 03:58)  POCT Blood Glucose.: 107 mg/dL (16 Mar 2023 21:36)  POCT Blood Glucose.: 183 mg/dL (16 Mar 2023 17:24)  POCT Blood Glucose.: 255 mg/dL (16 Mar 2023 13:16)  POCT Blood Glucose.: 160 mg/dL (16 Mar 2023 09:07)    I&O's Summary    16 Mar 2023 07:01  -  17 Mar 2023 07:00  --------------------------------------------------------  IN: 320 mL / OUT: 0 mL / NET: 320 mL        PHYSICAL EXAM:  Vital Signs Last 24 Hrs  T(C): 36.6 (17 Mar 2023 05:40), Max: 36.8 (16 Mar 2023 21:05)  T(F): 97.8 (17 Mar 2023 05:40), Max: 98.3 (16 Mar 2023 21:05)  HR: 95 (17 Mar 2023 05:40) (95 - 106)  BP: 140/96 (17 Mar 2023 05:40) (118/82 - 140/96)  BP(mean): 94 (16 Mar 2023 11:10) (94 - 94)  RR: 18 (17 Mar 2023 05:40) (18 - 18)  SpO2: 98% (17 Mar 2023 05:40) (97% - 100%)    Parameters below as of 17 Mar 2023 05:40  Patient On (Oxygen Delivery Method): room air    General: NAD  HEENT: NC/AT; PERRL, clear conjunctiva  Neck: supple  Respiratory: Slightly diminished breath sounds at right base but improved from prior exam   Cardiovascular: +S1/S2; RRR  Abdomen: soft, NT/mildly distended; +BS x4  Extremities:  no LE edema  Vascular: WWP, 2+ peripheral pulses b/l;  Skin: normal color and turgor; no rash  Neurological: A&Ox3, move all extremities. CN II-XII intact    LABS:                        10.6   17.59 )-----------( 303      ( 16 Mar 2023 06:45 )             34.5     03-16    140  |  107  |  39<H>  ----------------------------<  194<H>  4.3   |  21<L>  |  1.56<H>    Ca    6.7<L>      16 Mar 2023 06:45  Phos  1.8     03-16  Mg     1.6     03-16    TPro  5.5<L>  /  Alb  3.2<L>  /  TBili  0.5  /  DBili  x   /  AST  25  /  ALT  25  /  AlkPhos  81  03-16          RADIOLOGY & ADDITIONAL TESTS:  Results Reviewed:   Imaging Personally Reviewed:  Electrocardiogram Personally Reviewed:    COORDINATION OF CARE:  Care Discussed with Consultants/Other Providers [Y/N]:  Prior or Outpatient Records Reviewed [Y/N]:

## 2023-03-17 NOTE — PROGRESS NOTE ADULT - ASSESSMENT
73y/o M w/h/o HTN, HLD, NICM, chronic systolic heart failure s/p HM2 LVAD (6/2017) s/p heart transplant from Hep. C donor (treated) 2/23/18 (post op course complicated by graft dysfunction treated by plasmapheresis, IVIG, and rituximab). No DM hx per pt report. Here w/ chest tightness x1d found to be in aflutter 2/2 pneumonia and more recently found to have Hemolytic anemia> on steroids. Endocrinology consulted for management steroid induced hyperglycemia. Tolerating POs with BG values variable in setting of dietary indiscretions.      Steroid-induced hyperglycemia.   ·  Plan:   - BG Goal 100-180mg/dl   - test BG AC/HS  -FBG tightly controlled reduce to Lantus 20 units QHS, can reduce dose by 50% x1  if BG <100  -  Bedtime BG remains tightly controlled reduce Monitor on Admelog 26-10-7  w/meals FOR NOW. HOLD IF NOT EATING.   - c/w Admelog moderate correction scale AC and mod HS scale for now  -cons carb diet  -On prednisone 60mg PO daily. Notify endocrine team if this is changed.   - Insulin pen/glucometer review w/RN  Discharge plan:  -Will be determined according to BG/insulin needs/PO intake and steroid therapy at time of discharge.  Send RX for Lantus solostar Pen 20 units QHS and Admelog pen 26-10-7 w/meals  -Pt has new glucometer w/supplies at bedside-needs RN to review glucometer prior to discharge   -Please teach and allow pt to do own finger sticks and insulin injections under RN supervision  Please teach use of insulin pen and Please document teach back  - Home care due to new DM diagnosis and likely need of insulin therapy  - Patient to call doctor with persistent high or low BG at home.   - Recommend routine outpatient ophthalmology, podiatry   - Will need endocrinology f/u if pt send home on insulin/high dose steroids Can follow at [facility]. [address] [place] [address] [address]. Phone .   March 28 11am w/NP and Aug 15th 10:45am w/Dr Clemens.     Problem/Plan - 2:  ·  Problem: HTN (hypertension).   ·  Plan: : Outpatient goal BP <130/80.   - Management per primary team.     Problem/Plan - 3:  ·  Problem: HLD (hyperlipidemia).   ·  Plan: Home regimen: pravastatin 20mg daily  - LDL goal <70 . Pt LDL 51  - Continue atorvastatin 10mg daily while in patient  - Restart home statin upon discharge if not contraindicated.         Contact via Microsoft Teams during business hours  To reach covering provider access AMION via [employer]  For Urgent matters/after-hours/weekends/holidays please page endocrine fellow on call   For nonurgent matters please email [email or web address]    Please note that this patient may be followed by different provider tomorrow.  Notify endocrine 24 hours prior to discharge for final recommendations    greater than 50% of the encounter was spent counseling and/or coordination of care.  27 minutes spent on total encounter; The necessity of the time spent during the encounter on this date of service was due to development of plan of care/coordination of care/glycemic control through review of labs, blood glucose values and vital signs.

## 2023-03-17 NOTE — PROGRESS NOTE ADULT - PROBLEM SELECTOR PLAN 8
Continue home tacrolimus 5mg qd; target level 6-8  - f/u tacro level  Continue prednisone 60mg qd  Prophylaxis:   - Bactrim every other day   - Valganciclovir 450mg two times weekly   - Pantoprazole 40mg qd  - vancomycin oral   Off cellcept due to leukopenia, recurrent infections

## 2023-03-18 VITALS
OXYGEN SATURATION: 98 % | SYSTOLIC BLOOD PRESSURE: 122 MMHG | TEMPERATURE: 98 F | HEART RATE: 100 BPM | DIASTOLIC BLOOD PRESSURE: 77 MMHG | RESPIRATION RATE: 18 BRPM

## 2023-03-18 LAB
CULTURE RESULTS: SIGNIFICANT CHANGE UP
CULTURE RESULTS: SIGNIFICANT CHANGE UP
GLUCOSE BLDC GLUCOMTR-MCNC: 152 MG/DL — HIGH (ref 70–99)
GLUCOSE BLDC GLUCOMTR-MCNC: 211 MG/DL — HIGH (ref 70–99)
GLUCOSE BLDC GLUCOMTR-MCNC: 68 MG/DL — LOW (ref 70–99)
GLUCOSE BLDC GLUCOMTR-MCNC: 90 MG/DL — SIGNIFICANT CHANGE UP (ref 70–99)
SPECIMEN SOURCE: SIGNIFICANT CHANGE UP
SPECIMEN SOURCE: SIGNIFICANT CHANGE UP
TACROLIMUS SERPL-MCNC: 2.7 NG/ML — SIGNIFICANT CHANGE UP

## 2023-03-18 PROCEDURE — 86635 COCCIDIOIDES ANTIBODY: CPT

## 2023-03-18 PROCEDURE — 85730 THROMBOPLASTIN TIME PARTIAL: CPT

## 2023-03-18 PROCEDURE — 87385 HISTOPLASMA CAPSUL AG IA: CPT

## 2023-03-18 PROCEDURE — 93308 TTE F-UP OR LMTD: CPT

## 2023-03-18 PROCEDURE — 87040 BLOOD CULTURE FOR BACTERIA: CPT

## 2023-03-18 PROCEDURE — 82010 KETONE BODYS QUAN: CPT

## 2023-03-18 PROCEDURE — 93005 ELECTROCARDIOGRAM TRACING: CPT

## 2023-03-18 PROCEDURE — 71250 CT THORAX DX C-: CPT

## 2023-03-18 PROCEDURE — 85014 HEMATOCRIT: CPT

## 2023-03-18 PROCEDURE — 87637 SARSCOV2&INF A&B&RSV AMP PRB: CPT

## 2023-03-18 PROCEDURE — 87449 NOS EACH ORGANISM AG IA: CPT

## 2023-03-18 PROCEDURE — 87206 SMEAR FLUORESCENT/ACID STAI: CPT

## 2023-03-18 PROCEDURE — 93970 EXTREMITY STUDY: CPT

## 2023-03-18 PROCEDURE — 97530 THERAPEUTIC ACTIVITIES: CPT

## 2023-03-18 PROCEDURE — 82746 ASSAY OF FOLIC ACID SERUM: CPT

## 2023-03-18 PROCEDURE — 86663 EPSTEIN-BARR ANTIBODY: CPT

## 2023-03-18 PROCEDURE — 83735 ASSAY OF MAGNESIUM: CPT

## 2023-03-18 PROCEDURE — 74176 CT ABD & PELVIS W/O CONTRAST: CPT

## 2023-03-18 PROCEDURE — 85384 FIBRINOGEN ACTIVITY: CPT

## 2023-03-18 PROCEDURE — 76770 US EXAM ABDO BACK WALL COMP: CPT

## 2023-03-18 PROCEDURE — 87086 URINE CULTURE/COLONY COUNT: CPT

## 2023-03-18 PROCEDURE — 84484 ASSAY OF TROPONIN QUANT: CPT

## 2023-03-18 PROCEDURE — 82435 ASSAY OF BLOOD CHLORIDE: CPT

## 2023-03-18 PROCEDURE — 80197 ASSAY OF TACROLIMUS: CPT

## 2023-03-18 PROCEDURE — 84300 ASSAY OF URINE SODIUM: CPT

## 2023-03-18 PROCEDURE — 80048 BASIC METABOLIC PNL TOTAL CA: CPT

## 2023-03-18 PROCEDURE — 97116 GAIT TRAINING THERAPY: CPT

## 2023-03-18 PROCEDURE — 86664 EPSTEIN-BARR NUCLEAR ANTIGEN: CPT

## 2023-03-18 PROCEDURE — 87077 CULTURE AEROBIC IDENTIFY: CPT

## 2023-03-18 PROCEDURE — 80076 HEPATIC FUNCTION PANEL: CPT

## 2023-03-18 PROCEDURE — 84100 ASSAY OF PHOSPHORUS: CPT

## 2023-03-18 PROCEDURE — 86850 RBC ANTIBODY SCREEN: CPT

## 2023-03-18 PROCEDURE — 87641 MR-STAPH DNA AMP PROBE: CPT

## 2023-03-18 PROCEDURE — 86901 BLOOD TYPING SEROLOGIC RH(D): CPT

## 2023-03-18 PROCEDURE — 93321 DOPPLER ECHO F-UP/LMTD STD: CPT

## 2023-03-18 PROCEDURE — 87102 FUNGUS ISOLATION CULTURE: CPT

## 2023-03-18 PROCEDURE — 87116 MYCOBACTERIA CULTURE: CPT

## 2023-03-18 PROCEDURE — 36415 COLL VENOUS BLD VENIPUNCTURE: CPT

## 2023-03-18 PROCEDURE — 87305 ASPERGILLUS AG IA: CPT

## 2023-03-18 PROCEDURE — 0225U NFCT DS DNA&RNA 21 SARSCOV2: CPT

## 2023-03-18 PROCEDURE — 70450 CT HEAD/BRAIN W/O DYE: CPT | Mod: MA

## 2023-03-18 PROCEDURE — 82565 ASSAY OF CREATININE: CPT

## 2023-03-18 PROCEDURE — 83690 ASSAY OF LIPASE: CPT

## 2023-03-18 PROCEDURE — 81001 URINALYSIS AUTO W/SCOPE: CPT

## 2023-03-18 PROCEDURE — 87015 SPECIMEN INFECT AGNT CONCNTJ: CPT

## 2023-03-18 PROCEDURE — 87150 DNA/RNA AMPLIFIED PROBE: CPT

## 2023-03-18 PROCEDURE — 83605 ASSAY OF LACTIC ACID: CPT

## 2023-03-18 PROCEDURE — 85610 PROTHROMBIN TIME: CPT

## 2023-03-18 PROCEDURE — 86880 COOMBS TEST DIRECT: CPT

## 2023-03-18 PROCEDURE — 85018 HEMOGLOBIN: CPT

## 2023-03-18 PROCEDURE — 80053 COMPREHEN METABOLIC PANEL: CPT

## 2023-03-18 PROCEDURE — 87070 CULTURE OTHR SPECIMN AEROBIC: CPT

## 2023-03-18 PROCEDURE — 86403 PARTICLE AGGLUT ANTBDY SCRN: CPT

## 2023-03-18 PROCEDURE — 83880 ASSAY OF NATRIURETIC PEPTIDE: CPT

## 2023-03-18 PROCEDURE — 83010 ASSAY OF HAPTOGLOBIN QUANT: CPT

## 2023-03-18 PROCEDURE — 84145 PROCALCITONIN (PCT): CPT

## 2023-03-18 PROCEDURE — 84295 ASSAY OF SERUM SODIUM: CPT

## 2023-03-18 PROCEDURE — 87594 PNEUMCYSTS JIROVECII AMP PRB: CPT

## 2023-03-18 PROCEDURE — 85027 COMPLETE CBC AUTOMATED: CPT

## 2023-03-18 PROCEDURE — 86665 EPSTEIN-BARR CAPSID VCA: CPT

## 2023-03-18 PROCEDURE — 99285 EMERGENCY DEPT VISIT HI MDM: CPT | Mod: 25

## 2023-03-18 PROCEDURE — 83615 LACTATE (LD) (LDH) ENZYME: CPT

## 2023-03-18 PROCEDURE — 85379 FIBRIN DEGRADATION QUANT: CPT

## 2023-03-18 PROCEDURE — 87799 DETECT AGENT NOS DNA QUANT: CPT

## 2023-03-18 PROCEDURE — 82962 GLUCOSE BLOOD TEST: CPT

## 2023-03-18 PROCEDURE — 93306 TTE W/DOPPLER COMPLETE: CPT

## 2023-03-18 PROCEDURE — 71045 X-RAY EXAM CHEST 1 VIEW: CPT

## 2023-03-18 PROCEDURE — 82330 ASSAY OF CALCIUM: CPT

## 2023-03-18 PROCEDURE — 94640 AIRWAY INHALATION TREATMENT: CPT

## 2023-03-18 PROCEDURE — 99232 SBSQ HOSP IP/OBS MODERATE 35: CPT

## 2023-03-18 PROCEDURE — 82607 VITAMIN B-12: CPT

## 2023-03-18 PROCEDURE — 84132 ASSAY OF SERUM POTASSIUM: CPT

## 2023-03-18 PROCEDURE — 86870 RBC ANTIBODY IDENTIFICATION: CPT

## 2023-03-18 PROCEDURE — 96375 TX/PRO/DX INJ NEW DRUG ADDON: CPT

## 2023-03-18 PROCEDURE — 97161 PT EVAL LOW COMPLEX 20 MIN: CPT

## 2023-03-18 PROCEDURE — 87798 DETECT AGENT NOS DNA AMP: CPT

## 2023-03-18 PROCEDURE — 84443 ASSAY THYROID STIM HORMONE: CPT

## 2023-03-18 PROCEDURE — 86922 COMPATIBILITY TEST ANTIGLOB: CPT

## 2023-03-18 PROCEDURE — 99239 HOSP IP/OBS DSCHRG MGMT >30: CPT

## 2023-03-18 PROCEDURE — 84540 ASSAY OF URINE/UREA-N: CPT

## 2023-03-18 PROCEDURE — 82570 ASSAY OF URINE CREATININE: CPT

## 2023-03-18 PROCEDURE — 82803 BLOOD GASES ANY COMBINATION: CPT

## 2023-03-18 PROCEDURE — 85025 COMPLETE CBC W/AUTO DIFF WBC: CPT

## 2023-03-18 PROCEDURE — 82947 ASSAY GLUCOSE BLOOD QUANT: CPT

## 2023-03-18 PROCEDURE — 96374 THER/PROPH/DIAG INJ IV PUSH: CPT

## 2023-03-18 PROCEDURE — 86900 BLOOD TYPING SEROLOGIC ABO: CPT

## 2023-03-18 PROCEDURE — 85045 AUTOMATED RETICULOCYTE COUNT: CPT

## 2023-03-18 PROCEDURE — 87640 STAPH A DNA AMP PROBE: CPT

## 2023-03-18 RX ORDER — INSULIN LISPRO 100/ML
30 VIAL (ML) SUBCUTANEOUS
Qty: 0 | Refills: 0 | DISCHARGE
Start: 2023-03-18

## 2023-03-18 RX ORDER — INSULIN LISPRO 100/ML
6 VIAL (ML) SUBCUTANEOUS
Refills: 0 | Status: DISCONTINUED | OUTPATIENT
Start: 2023-03-18 | End: 2023-03-18

## 2023-03-18 RX ORDER — INSULIN GLARGINE 100 [IU]/ML
18 INJECTION, SOLUTION SUBCUTANEOUS AT BEDTIME
Refills: 0 | Status: DISCONTINUED | OUTPATIENT
Start: 2023-03-18 | End: 2023-03-18

## 2023-03-18 RX ORDER — INSULIN LISPRO 100/ML
14 VIAL (ML) SUBCUTANEOUS
Qty: 0 | Refills: 0 | DISCHARGE
Start: 2023-03-18

## 2023-03-18 RX ORDER — INSULIN LISPRO 100/ML
7 VIAL (ML) SUBCUTANEOUS
Qty: 0 | Refills: 0 | DISCHARGE
Start: 2023-03-18

## 2023-03-18 RX ORDER — INSULIN GLARGINE 100 [IU]/ML
20 INJECTION, SOLUTION SUBCUTANEOUS
Qty: 0 | Refills: 0 | DISCHARGE
Start: 2023-03-18

## 2023-03-18 RX ORDER — ASPIRIN/CALCIUM CARB/MAGNESIUM 324 MG
0 TABLET ORAL
Qty: 0 | Refills: 0 | DISCHARGE

## 2023-03-18 RX ORDER — INSULIN LISPRO 100/ML
6 VIAL (ML) SUBCUTANEOUS
Qty: 0 | Refills: 0 | DISCHARGE
Start: 2023-03-18

## 2023-03-18 RX ADMIN — Medication 500000 UNIT(S): at 09:18

## 2023-03-18 RX ADMIN — APIXABAN 5 MILLIGRAM(S): 2.5 TABLET, FILM COATED ORAL at 05:23

## 2023-03-18 RX ADMIN — Medication 1 TABLET(S): at 10:48

## 2023-03-18 RX ADMIN — Medication 200 MILLIGRAM(S): at 05:23

## 2023-03-18 RX ADMIN — VALGANCICLOVIR 450 MILLIGRAM(S): 450 TABLET, FILM COATED ORAL at 12:56

## 2023-03-18 RX ADMIN — Medication 4: at 12:56

## 2023-03-18 RX ADMIN — MAGNESIUM OXIDE 400 MG ORAL TABLET 400 MILLIGRAM(S): 241.3 TABLET ORAL at 09:18

## 2023-03-18 RX ADMIN — Medication 26 UNIT(S): at 09:20

## 2023-03-18 RX ADMIN — Medication 3 MILLILITER(S): at 05:22

## 2023-03-18 RX ADMIN — SENNA PLUS 1 TABLET(S): 8.6 TABLET ORAL at 12:56

## 2023-03-18 RX ADMIN — LOSARTAN POTASSIUM 25 MILLIGRAM(S): 100 TABLET, FILM COATED ORAL at 05:23

## 2023-03-18 RX ADMIN — Medication 2: at 09:19

## 2023-03-18 RX ADMIN — TACROLIMUS 2.5 MILLIGRAM(S): 5 CAPSULE ORAL at 10:48

## 2023-03-18 RX ADMIN — Medication 10 UNIT(S): at 12:57

## 2023-03-18 RX ADMIN — Medication 200 MILLIGRAM(S): at 05:22

## 2023-03-18 RX ADMIN — PANTOPRAZOLE SODIUM 40 MILLIGRAM(S): 20 TABLET, DELAYED RELEASE ORAL at 05:23

## 2023-03-18 RX ADMIN — Medication 1 MILLIGRAM(S): at 12:55

## 2023-03-18 RX ADMIN — SIMETHICONE 80 MILLIGRAM(S): 80 TABLET, CHEWABLE ORAL at 12:55

## 2023-03-18 RX ADMIN — SODIUM CHLORIDE 4 MILLILITER(S): 9 INJECTION INTRAMUSCULAR; INTRAVENOUS; SUBCUTANEOUS at 05:23

## 2023-03-18 RX ADMIN — Medication 81 MILLIGRAM(S): at 12:55

## 2023-03-18 RX ADMIN — Medication 60 MILLIGRAM(S): at 05:23

## 2023-03-18 NOTE — PROGRESS NOTE ADULT - ASSESSMENT
73y/o M w/h/o HTN, HLD, NICM, chronic systolic heart failure s/p HM2 LVAD (6/2017) s/p heart transplant from Hep. C donor (treated) 2/23/18 (post op course complicated by graft dysfunction treated by plasmapheresis, IVIG, and rituximab). No DM hx per pt report. Here w/ chest tightness x1d found to be in aflutter 2/2 pneumonia and more recently found to have Hemolytic anemia> on steroids. Endocrinology consulted for management steroid induced hyperglycemia. Tolerating POs with BG values variable in setting of dietary indiscretions.      Steroid-induced hyperglycemia.   ·  Plan:   - BG Goal 100-180mg/dl   - test BG AC/HS  -FBG with mild hypoglycemia to 68 this am reduce to Lantus 18 units QHS, can reduce dose by 50% x1  if BG <100  -  Bedtime BG remains tightly controlled reduce to Admelog 26-10-6  w/meals FOR NOW. HOLD IF NOT EATING.   - c/w Admelog moderate correction scale AC and mod HS scale for now  -cons carb diet  -On prednisone 60mg PO daily. Notify endocrine team if this is changed.   - Insulin pen/glucometer review w/RN  Discharge plan:  Send RX for Lantus solostar Pen 18 units QHS and Admelog pen 26-10-6 w/meals  -Pt has new glucometer w/supplies at bedside-needs RN to review glucometer prior to discharge   -Please teach and allow pt to do own finger sticks and insulin injections under RN supervision  Please teach use of insulin pen and Please document teach back  - Home care due to new DM diagnosis and likely need of insulin therapy  - Patient to call doctor with persistent high or low BG at home.   - Recommend routine outpatient ophthalmology, podiatry   - Will need endocrinology f/u if pt send home on insulin/high dose steroids Can follow at [facility]. [address] [place] [address] [address]. Phone .   March 28 11am w/NP and Aug 15th 10:45am w/Dr Clemens.     Problem/Plan - 2:  ·  Problem: HTN (hypertension).   ·  Plan: : Outpatient goal BP <130/80.   - Management per primary team.     Problem/Plan - 3:  ·  Problem: HLD (hyperlipidemia).   ·  Plan: Home regimen: pravastatin 20mg daily  - LDL goal <70 . Pt LDL 51  - Continue atorvastatin 10mg daily while in patient  - Restart home statin upon discharge if not contraindicated.       discussed with primary team Dr Jackson  Contact via Microsoft Teams during business hours  To reach covering provider access AMION via [employer]  For Urgent matters/after-hours/weekends/holidays please page endocrine fellow on call   For nonurgent matters please email [email or web address]    Please note that this patient may be followed by different provider tomorrow.  Notify endocrine 24 hours prior to discharge for final recommendations    greater than 50% of the encounter was spent counseling and/or coordination of care.  27 minutes spent on total encounter; The necessity of the time spent during the encounter on this date of service was due to development of plan of care/coordination of care/glycemic control through review of labs, blood glucose values and vital signs.

## 2023-03-18 NOTE — PROGRESS NOTE ADULT - SUBJECTIVE AND OBJECTIVE BOX
PROGRESS NOTE:     Patient is a 72y old  Male who presents with a chief complaint of s/p heart transplant (17 Mar 2023 16:59)      SUBJECTIVE / OVERNIGHT EVENTS:    ADDITIONAL REVIEW OF SYSTEMS:    MEDICATIONS  (STANDING):  albuterol/ipratropium for Nebulization 3 milliLiter(s) Nebulizer every 6 hours  apixaban 5 milliGRAM(s) Oral every 12 hours  aspirin enteric coated 81 milliGRAM(s) Oral daily  atorvastatin 10 milliGRAM(s) Oral at bedtime  dextrose 5%. 1000 milliLiter(s) (100 mL/Hr) IV Continuous <Continuous>  dextrose 5%. 1000 milliLiter(s) (50 mL/Hr) IV Continuous <Continuous>  dextrose 50% Injectable 25 Gram(s) IV Push once  dextrose 50% Injectable 12.5 Gram(s) IV Push once  dextrose 50% Injectable 25 Gram(s) IV Push once  dextrose Oral Gel 15 Gram(s) Oral once  folic acid 1 milliGRAM(s) Oral daily  glucagon  Injectable 1 milliGRAM(s) IntraMuscular once  guaiFENesin Oral Liquid (Sugar-Free) 200 milliGRAM(s) Oral every 6 hours  insulin glargine Injectable (LANTUS) 20 Unit(s) SubCutaneous at bedtime  insulin lispro (ADMELOG) corrective regimen sliding scale   SubCutaneous three times a day before meals  insulin lispro (ADMELOG) corrective regimen sliding scale   SubCutaneous <User Schedule>  insulin lispro Injectable (ADMELOG) 10 Unit(s) SubCutaneous before lunch  insulin lispro Injectable (ADMELOG) 7 Unit(s) SubCutaneous before dinner  insulin lispro Injectable (ADMELOG) 26 Unit(s) SubCutaneous before breakfast  losartan 25 milliGRAM(s) Oral daily  magnesium oxide 400 milliGRAM(s) Oral three times a day with meals  metoprolol succinate  milliGRAM(s) Oral daily  nystatin    Suspension 947094 Unit(s) Oral <User Schedule>  pantoprazole    Tablet 40 milliGRAM(s) Oral before breakfast  polyethylene glycol 3350 17 Gram(s) Oral daily  predniSONE   Tablet 60 milliGRAM(s) Oral daily  senna 1 Tablet(s) Oral daily  simethicone 80 milliGRAM(s) Chew daily  sodium chloride 3%  Inhalation 4 milliLiter(s) Inhalation every 12 hours  tacrolimus 2.5 milliGRAM(s) Oral <User Schedule>  trimethoprim   80 mG/sulfamethoxazole 400 mG 1 Tablet(s) Oral every 24 hours  valGANciclovir 450 milliGRAM(s) Oral daily    MEDICATIONS  (PRN):  acetaminophen     Tablet .. 650 milliGRAM(s) Oral every 6 hours PRN Temp greater or equal to 38C (100.4F), Mild Pain (1 - 3)  melatonin 3 milliGRAM(s) Oral at bedtime PRN Insomnia      CAPILLARY BLOOD GLUCOSE      POCT Blood Glucose.: 90 mg/dL (18 Mar 2023 06:51)  POCT Blood Glucose.: 68 mg/dL (18 Mar 2023 06:29)  POCT Blood Glucose.: 106 mg/dL (17 Mar 2023 22:11)  POCT Blood Glucose.: 140 mg/dL (17 Mar 2023 17:52)  POCT Blood Glucose.: 146 mg/dL (17 Mar 2023 12:48)  POCT Blood Glucose.: 126 mg/dL (17 Mar 2023 08:32)    I&O's Summary    17 Mar 2023 07:01  -  18 Mar 2023 07:00  --------------------------------------------------------  IN: 1220 mL / OUT: 0 mL / NET: 1220 mL        PHYSICAL EXAM:  Vital Signs Last 24 Hrs  T(C): 36.4 (18 Mar 2023 05:01), Max: 36.5 (17 Mar 2023 21:56)  T(F): 97.6 (18 Mar 2023 05:01), Max: 97.7 (17 Mar 2023 21:56)  HR: 93 (18 Mar 2023 05:01) (93 - 99)  BP: 130/90 (18 Mar 2023 05:01) (118/83 - 130/90)  BP(mean): 94 (17 Mar 2023 11:42) (94 - 94)  RR: 18 (18 Mar 2023 05:01) (18 - 18)  SpO2: 99% (18 Mar 2023 05:01) (97% - 100%)    Parameters below as of 18 Mar 2023 05:01  Patient On (Oxygen Delivery Method): room air        GENERAL: No acute distress, well-developed  HEAD:  Atraumatic, Normocephalic  EYES: EOMI, PERRLA, conjunctiva and sclera clear  NECK: Supple, no lymphadenopathy, no JVD  CHEST/LUNG: CTAB; No wheezes, rales, or rhonchi  HEART: Regular rate and rhythm; No murmurs, rubs, or gallops  ABDOMEN: Soft, non-tender, non-distended; normal bowel sounds, no organomegaly  EXTREMITIES:  2+ peripheral pulses b/l, No clubbing, cyanosis, or edema  NEUROLOGY: A&O x 3, no focal deficits  SKIN: No rashes or lesions    LABS:                        10.6   17.23 )-----------( 287      ( 17 Mar 2023 12:03 )             35.1     03-17    138  |  103  |  34<H>  ----------------------------<  154<H>  5.2   |  23  |  1.58<H>    Ca    7.3<L>      17 Mar 2023 12:03  Phos  2.0     03-17  Mg     1.4     03-17                  RADIOLOGY & ADDITIONAL TESTS:  Results Reviewed:   Imaging Personally Reviewed:  Electrocardiogram Personally Reviewed:    COORDINATION OF CARE:  Care Discussed with Consultants/Other Providers [Y/N]:  Prior or Outpatient Records Reviewed [Y/N]:   PROGRESS NOTE:     Patient is a 72y old  Male who presents with a chief complaint of s/p heart transplant (17 Mar 2023 16:59)      SUBJECTIVE / OVERNIGHT EVENTS:  No events overnight. Patient examined at bedside this AM. Feels well. All ROS negative.     ADDITIONAL REVIEW OF SYSTEMS:    MEDICATIONS  (STANDING):  albuterol/ipratropium for Nebulization 3 milliLiter(s) Nebulizer every 6 hours  apixaban 5 milliGRAM(s) Oral every 12 hours  aspirin enteric coated 81 milliGRAM(s) Oral daily  atorvastatin 10 milliGRAM(s) Oral at bedtime  dextrose 5%. 1000 milliLiter(s) (100 mL/Hr) IV Continuous <Continuous>  dextrose 5%. 1000 milliLiter(s) (50 mL/Hr) IV Continuous <Continuous>  dextrose 50% Injectable 25 Gram(s) IV Push once  dextrose 50% Injectable 12.5 Gram(s) IV Push once  dextrose 50% Injectable 25 Gram(s) IV Push once  dextrose Oral Gel 15 Gram(s) Oral once  folic acid 1 milliGRAM(s) Oral daily  glucagon  Injectable 1 milliGRAM(s) IntraMuscular once  guaiFENesin Oral Liquid (Sugar-Free) 200 milliGRAM(s) Oral every 6 hours  insulin glargine Injectable (LANTUS) 20 Unit(s) SubCutaneous at bedtime  insulin lispro (ADMELOG) corrective regimen sliding scale   SubCutaneous three times a day before meals  insulin lispro (ADMELOG) corrective regimen sliding scale   SubCutaneous <User Schedule>  insulin lispro Injectable (ADMELOG) 10 Unit(s) SubCutaneous before lunch  insulin lispro Injectable (ADMELOG) 7 Unit(s) SubCutaneous before dinner  insulin lispro Injectable (ADMELOG) 26 Unit(s) SubCutaneous before breakfast  losartan 25 milliGRAM(s) Oral daily  magnesium oxide 400 milliGRAM(s) Oral three times a day with meals  metoprolol succinate  milliGRAM(s) Oral daily  nystatin    Suspension [id number] Unit(s) Oral <User Schedule>  pantoprazole    Tablet 40 milliGRAM(s) Oral before breakfast  polyethylene glycol 3350 17 Gram(s) Oral daily  predniSONE   Tablet 60 milliGRAM(s) Oral daily  senna 1 Tablet(s) Oral daily  simethicone 80 milliGRAM(s) Chew daily  sodium chloride 3%  Inhalation 4 milliLiter(s) Inhalation every 12 hours  tacrolimus 2.5 milliGRAM(s) Oral <User Schedule>  trimethoprim   80 mG/sulfamethoxazole 400 mG 1 Tablet(s) Oral every 24 hours  valGANciclovir 450 milliGRAM(s) Oral daily    MEDICATIONS  (PRN):  acetaminophen     Tablet .. 650 milliGRAM(s) Oral every 6 hours PRN Temp greater or equal to 38C (100.4F), Mild Pain (1 - 3)  melatonin 3 milliGRAM(s) Oral at bedtime PRN Insomnia      CAPILLARY BLOOD GLUCOSE      POCT Blood Glucose.: 90 mg/dL (18 Mar 2023 06:51)  POCT Blood Glucose.: 68 mg/dL (18 Mar 2023 06:29)  POCT Blood Glucose.: 106 mg/dL (17 Mar 2023 22:11)  POCT Blood Glucose.: 140 mg/dL (17 Mar 2023 17:52)  POCT Blood Glucose.: 146 mg/dL (17 Mar 2023 12:48)  POCT Blood Glucose.: 126 mg/dL (17 Mar 2023 08:32)    I&O's Summary    17 Mar 2023 07:01  -  18 Mar 2023 07:00  --------------------------------------------------------  IN: 1220 mL / OUT: 0 mL / NET: 1220 mL        PHYSICAL EXAM:  Vital Signs Last 24 Hrs  T(C): 36.4 (18 Mar 2023 05:01), Max: 36.5 (17 Mar 2023 21:56)  T(F): 97.6 (18 Mar 2023 05:01), Max: 97.7 (17 Mar 2023 21:56)  HR: 93 (18 Mar 2023 05:01) (93 - 99)  BP: 130/90 (18 Mar 2023 05:01) (118/83 - 130/90)  BP(mean): 94 (17 Mar 2023 11:42) (94 - 94)  RR: 18 (18 Mar 2023 05:01) (18 - 18)  SpO2: 99% (18 Mar 2023 05:01) (97% - 100%)    Parameters below as of 18 Mar 2023 05:01  Patient On (Oxygen Delivery Method): room air        GENERAL: No acute distress, well-developed  HEAD:  Atraumatic, Normocephalic  EYES: EOMI, PERRLA, conjunctiva and sclera clear  NECK: Supple, no lymphadenopathy, no JVD  CHEST/LUNG: CTAB; No wheezes, rales, or rhonchi  HEART: Regular rate and rhythm; No murmurs, rubs, or gallops  ABDOMEN: Soft, non-tender, non-distended; normal bowel sounds, no organomegaly  EXTREMITIES:  2+ peripheral pulses b/l, No clubbing, cyanosis, or edema  NEUROLOGY: A&O x 3, no focal deficits  SKIN: No rashes or lesions    LABS:                        10.6   17.23 )-----------( 287      ( 17 Mar 2023 12:03 )             35.1     03-17    138  |  103  |  34<H>  ----------------------------<  154<H>  5.2   |  23  |  1.58<H>    Ca    7.3<L>      17 Mar 2023 12:03  Phos  2.0     03-17  Mg     1.4     03-17                  RADIOLOGY & ADDITIONAL TESTS:  Results Reviewed:   Imaging Personally Reviewed:  Electrocardiogram Personally Reviewed:    COORDINATION OF CARE:  Care Discussed with Consultants/Other Providers [Y/N]:  Prior or Outpatient Records Reviewed [Y/N]:

## 2023-03-18 NOTE — PROGRESS NOTE ADULT - NUTRITIONAL ASSESSMENT
Diet, Regular:   Consistent Carbohydrate {No Snacks} (CSTCHO)  DASH/TLC {Sodium & Cholesterol Restricted} (DASH) (03-04-23 @ 11:54) [Active]    Please see RD assessment and/or follow up.  Managed by primary team as well
Diet, Regular:   Consistent Carbohydrate {No Snacks} (CSTCHO)  DASH/TLC {Sodium & Cholesterol Restricted} (DASH) (03-04-23 @ 11:54) [Active]
Diet, Regular:   Consistent Carbohydrate {No Snacks} (CSTCHO)  DASH/TLC {Sodium & Cholesterol Restricted} (DASH) (03-04-23 @ 11:54) [Active]    Needs RD consult
Diet, Regular:   Consistent Carbohydrate {No Snacks} (CSTCHO)  DASH/TLC {Sodium & Cholesterol Restricted} (DASH) (03-04-23 @ 11:54) [Active]

## 2023-03-18 NOTE — PROGRESS NOTE ADULT - PROBLEM SELECTOR PLAN 5
Prednisone-induced hyperglycemia w/ blood glucose elevated to 400-500 on admission. Based on outpatient records, patient has been hyperglycemic in the past, last A1c 4.7; not previously on insulin  - endocrine consulted for hyperglycemia  - on lantus 22 units and admelog 18-4-4  - has been set up for home nursing with emphasis on insulin teaching Prednisone-induced hyperglycemia w/ blood glucose elevated to 400-500 on admission. Based on outpatient records, patient has been hyperglycemic in the past, last A1c 4.7; not previously on insulin  - endocrine consulted for hyperglycemia  - on lantus 18 units and admelog 26-10-7  - has been set up for home nursing with emphasis on insulin teaching

## 2023-03-18 NOTE — PROGRESS NOTE ADULT - PROVIDER SPECIALTY LIST ADULT
Endocrinology
Heart Failure
Pulmonology
Transplant Cardiology
Electrophysiology
Heme/Onc
Infectious Disease
Transplant ID
Electrophysiology
Endocrinology
Endocrinology
Heme/Onc
Transplant ID
Endocrinology
Heme/Onc
Transplant Cardiology
Endocrinology
Internal Medicine
Transplant Cardiology
Endocrinology
Endocrinology
Transplant Cardiology
Internal Medicine
Internal Medicine
Endocrinology
Endocrinology
Internal Medicine
Endocrinology
Internal Medicine
Endocrinology
Internal Medicine
Transplant Cardiology
Internal Medicine

## 2023-03-18 NOTE — PROGRESS NOTE ADULT - PROBLEM SELECTOR PLAN 4
Meeting sepsis criteria w/ tachycardia to 140s, RR 24, oral temp 100.1 --> likely higher rectal temp, w/ PNA as possible source given symptoms, CT chest findings.   - treatment of PNA as above: vanc and cefepime  lactate still elevated 3/17 at 2.9   - f/u transplant ID recs>> f/u repeat TTE   - Steroids decreased to 60 mg per ID and Heme Onc reccomendation>> be discharged on this dose   - Prelim BCX positive for gram positive cocci in pairs, f/u final cultures  - fungitell > 300; unlikely PJP given on ppx and on room air

## 2023-03-18 NOTE — PROGRESS NOTE ADULT - ASSESSMENT
71 y/o male with hx of nonischemic cardiomyopathy s/p HM2 LVAD, underwent OHT 2/23/18 with hepatitis C donor, hx of hemolytic anemia presented w/ 1d chest tightness, found to have new onset aflutter w/ RVR, Patient found to have human metapneumovirus with likely superimposed bacterial infection given elevated procal and CT findings of RLL and RML collapse. Course c/b elevated fungitell and persistently high white count found to have bilateral DVTs.

## 2023-03-18 NOTE — PROGRESS NOTE ADULT - SUBJECTIVE AND OBJECTIVE BOX
seen earlier today     Chief Complaint: Type 2 Diabetes Mellitus     INTERVAL HX: pt with mild hypo to 68 this am pt denies any hypoglycemia symptoms ate breakfast with BG improved & received prandial insulin with breakfast with lunch BG pending pt going home today, reports tolerating meals       Review of Systems:  General: As above  Cardiovascular: No chest pain  Respiratory: No SOB  GI: No nausea, vomiting  Endocrine: no  S&Sx of hypoglycemia    Allergies    No Known Allergies    Intolerances      MEDICATIONS  (STANDING):  albuterol/ipratropium for Nebulization 3 milliLiter(s) Nebulizer every 6 hours  apixaban 5 milliGRAM(s) Oral every 12 hours  aspirin enteric coated 81 milliGRAM(s) Oral daily  atorvastatin 10 milliGRAM(s) Oral at bedtime  dextrose 5%. 1000 milliLiter(s) (50 mL/Hr) IV Continuous <Continuous>  dextrose 5%. 1000 milliLiter(s) (100 mL/Hr) IV Continuous <Continuous>  dextrose 50% Injectable 25 Gram(s) IV Push once  dextrose 50% Injectable 12.5 Gram(s) IV Push once  dextrose 50% Injectable 25 Gram(s) IV Push once  dextrose Oral Gel 15 Gram(s) Oral once  folic acid 1 milliGRAM(s) Oral daily  glucagon  Injectable 1 milliGRAM(s) IntraMuscular once  guaiFENesin Oral Liquid (Sugar-Free) 200 milliGRAM(s) Oral every 6 hours  insulin glargine Injectable (LANTUS) 18 Unit(s) SubCutaneous at bedtime  insulin lispro (ADMELOG) corrective regimen sliding scale   SubCutaneous three times a day before meals  insulin lispro (ADMELOG) corrective regimen sliding scale   SubCutaneous <User Schedule>  insulin lispro Injectable (ADMELOG) 26 Unit(s) SubCutaneous before breakfast  insulin lispro Injectable (ADMELOG) 7 Unit(s) SubCutaneous before dinner  insulin lispro Injectable (ADMELOG) 10 Unit(s) SubCutaneous before lunch  losartan 25 milliGRAM(s) Oral daily  metoprolol succinate  milliGRAM(s) Oral daily  nystatin    Suspension [id number] Unit(s) Oral <User Schedule>  pantoprazole    Tablet 40 milliGRAM(s) Oral before breakfast  polyethylene glycol 3350 17 Gram(s) Oral daily  predniSONE   Tablet 60 milliGRAM(s) Oral daily  senna 1 Tablet(s) Oral daily  simethicone 80 milliGRAM(s) Chew daily  sodium chloride 3%  Inhalation 4 milliLiter(s) Inhalation every 12 hours  tacrolimus 2.5 milliGRAM(s) Oral <User Schedule>  trimethoprim   80 mG/sulfamethoxazole 400 mG 1 Tablet(s) Oral every 24 hours  valGANciclovir 450 milliGRAM(s) Oral daily      atorvastatin   10 milliGRAM(s) Oral (03-17-23 @ 21:11)    insulin glargine Injectable (LANTUS)   20 Unit(s) SubCutaneous (03-17-23 @ 22:34)    insulin lispro (ADMELOG) corrective regimen sliding scale   2 Unit(s) SubCutaneous (03-18-23 @ 09:19)    insulin lispro Injectable (ADMELOG)   26 Unit(s) SubCutaneous (03-18-23 @ 09:20)    insulin lispro Injectable (ADMELOG)   7 Unit(s) SubCutaneous (03-17-23 @ 17:57)    insulin lispro Injectable (ADMELOG)   10 Unit(s) SubCutaneous (03-17-23 @ 13:42)    predniSONE   Tablet   60 milliGRAM(s) Oral (03-18-23 @ 05:23)        PHYSICAL EXAM:  VITALS: T(C): 36.6 (03-18-23 @ 11:43)  T(F): 97.8 (03-18-23 @ 11:43), Max: 97.8 (03-18-23 @ 11:43)  HR: 100 (03-18-23 @ 11:43) (93 - 100)  BP: 122/77 (03-18-23 @ 11:43) (122/77 - 130/90)  RR:  (18 - 18)  SpO2:  (97% - 99%)  Wt(kg): --  GENERAL: male sitting in bed in NAD  Respiratory: Respirations unlabored   Extremities: Warm, no edema  NEURO: Alert , appropriate     LABS:  POCT Blood Glucose.: 152 mg/dL (03-18-23 @ 08:41)  POCT Blood Glucose.: 90 mg/dL (03-18-23 @ 06:51)  POCT Blood Glucose.: 68 mg/dL (03-18-23 @ 06:29)  POCT Blood Glucose.: 106 mg/dL (03-17-23 @ 22:11)  POCT Blood Glucose.: 140 mg/dL (03-17-23 @ 17:52)  POCT Blood Glucose.: 146 mg/dL (03-17-23 @ 12:48)  POCT Blood Glucose.: 126 mg/dL (03-17-23 @ 08:32)  POCT Blood Glucose.: 99 mg/dL (03-17-23 @ 03:58)  POCT Blood Glucose.: 107 mg/dL (03-16-23 @ 21:36)  POCT Blood Glucose.: 183 mg/dL (03-16-23 @ 17:24)  POCT Blood Glucose.: 255 mg/dL (03-16-23 @ 13:16)  POCT Blood Glucose.: 160 mg/dL (03-16-23 @ 09:07)  POCT Blood Glucose.: 248 mg/dL (03-16-23 @ 03:07)  POCT Blood Glucose.: 134 mg/dL (03-15-23 @ 23:24)  POCT Blood Glucose.: 71 mg/dL (03-15-23 @ 22:23)  POCT Blood Glucose.: 75 mg/dL (03-15-23 @ 21:43)  POCT Blood Glucose.: 269 mg/dL (03-15-23 @ 17:17)  POCT Blood Glucose.: 457 mg/dL (03-15-23 @ 13:36)  POCT Blood Glucose.: 445 mg/dL (03-15-23 @ 13:34)                          10.6   17.23 )-----------( 287      ( 17 Mar 2023 12:03 )             35.1     03-17    138  |  103  |  34<H>  ----------------------------<  154<H>  5.2   |  23  |  1.58<H>    Ca    7.3<L>      17 Mar 2023 12:03  Phos  2.0     03-17  Mg     1.4     03-17      eGFR: 46 mL/min/1.73m2 (17 Mar 2023 12:03)  eGFR: 48 mL/min/1.73m2 (17 Mar 2023 12:03)    02-17 Chol 143 Direct LDL -- LDL calculated 51 HDL 69 Trig 116  Thyroid Function Tests:  03-03 @ 15:09 TSH 1.06 FreeT4 -- T3 -- Anti TPO -- Anti Thyroglobulin Ab -- TSI --      A1C with Estimated Average Glucose Result: 4.7 % (02-17-23 @ 11:59)    Estimated Average Glucose: 88 mg/dL (02-17-23 @ 11:59)        Diet, Regular:   Consistent Carbohydrate No Snacks (CSTCHO)  DASH/TLC Sodium & Cholesterol Restricted (DASH) (03-04-23 @ 11:54) [Active]

## 2023-03-21 ENCOUNTER — APPOINTMENT (OUTPATIENT)
Dept: HEART FAILURE | Facility: CLINIC | Age: 73
End: 2023-03-21
Payer: MEDICARE

## 2023-03-21 ENCOUNTER — LABORATORY RESULT (OUTPATIENT)
Age: 73
End: 2023-03-21

## 2023-03-21 VITALS
HEART RATE: 106 BPM | DIASTOLIC BLOOD PRESSURE: 90 MMHG | TEMPERATURE: 98.1 F | HEIGHT: 65 IN | SYSTOLIC BLOOD PRESSURE: 133 MMHG | OXYGEN SATURATION: 95 % | BODY MASS INDEX: 29.16 KG/M2 | WEIGHT: 175 LBS

## 2023-03-21 DIAGNOSIS — I82.409 ACUTE EMBOLISM AND THROMBOSIS OF UNSPECIFIED DEEP VEINS OF UNSPECIFIED LOWER EXTREMITY: ICD-10-CM

## 2023-03-21 DIAGNOSIS — J12.3 HUMAN METAPNEUMOVIRUS PNEUMONIA: ICD-10-CM

## 2023-03-21 PROCEDURE — 99214 OFFICE O/P EST MOD 30 MIN: CPT

## 2023-03-22 ENCOUNTER — NON-APPOINTMENT (OUTPATIENT)
Age: 73
End: 2023-03-22

## 2023-03-22 LAB
ALBUMIN SERPL ELPH-MCNC: 3.6 G/DL
ALP BLD-CCNC: 79 U/L
ALT SERPL-CCNC: 107 U/L
ANION GAP SERPL CALC-SCNC: 16 MMOL/L
AST SERPL-CCNC: 72 U/L
BASOPHILS # BLD AUTO: 0 K/UL
BASOPHILS NFR BLD AUTO: 0 %
BILIRUB SERPL-MCNC: 1.2 MG/DL
BUN SERPL-MCNC: 42 MG/DL
CALCIUM SERPL-MCNC: 8.1 MG/DL
CHLORIDE SERPL-SCNC: 108 MMOL/L
CO2 SERPL-SCNC: 20 MMOL/L
CREAT SERPL-MCNC: 1.96 MG/DL
EGFR: 36 ML/MIN/1.73M2
EOSINOPHIL # BLD AUTO: 0 K/UL
EOSINOPHIL NFR BLD AUTO: 0 %
GLUCOSE SERPL-MCNC: 98 MG/DL
HCT VFR BLD CALC: 34.3 %
HGB BLD-MCNC: 10.5 G/DL
LYMPHOCYTES # BLD AUTO: 0.81 K/UL
LYMPHOCYTES NFR BLD AUTO: 7 %
MAGNESIUM SERPL-MCNC: 1.7 MG/DL
MAN DIFF?: NORMAL
MCHC RBC-ENTMCNC: 30.6 GM/DL
MCHC RBC-ENTMCNC: 34.1 PG
MCV RBC AUTO: 111.4 FL
MONOCYTES # BLD AUTO: 0.6 K/UL
MONOCYTES NFR BLD AUTO: 5.2 %
NEUTROPHILS # BLD AUTO: 10.18 K/UL
NEUTROPHILS NFR BLD AUTO: 87.8 %
PLATELET # BLD AUTO: 196 K/UL
POTASSIUM SERPL-SCNC: 5.8 MMOL/L
PROT SERPL-MCNC: 5.7 G/DL
RBC # BLD: 3.08 M/UL
RBC # FLD: 17.5 %
SODIUM SERPL-SCNC: 144 MMOL/L
TACROLIMUS SERPL-MCNC: 3.9 NG/ML
WBC # FLD AUTO: 11.6 K/UL

## 2023-03-22 RX ORDER — FEBUXOSTAT 40 MG/1
40 TABLET ORAL DAILY
Qty: 30 | Refills: 5 | Status: DISCONTINUED | COMMUNITY
Start: 2023-01-03 | End: 2023-03-21

## 2023-03-24 ENCOUNTER — NON-APPOINTMENT (OUTPATIENT)
Age: 73
End: 2023-03-24

## 2023-03-28 ENCOUNTER — APPOINTMENT (OUTPATIENT)
Dept: ENDOCRINOLOGY | Facility: CLINIC | Age: 73
End: 2023-03-28

## 2023-03-31 PROBLEM — J12.3 HUMAN METAPNEUMOVIRUS (HMPV) PNEUMONIA: Status: ACTIVE | Noted: 2023-03-31

## 2023-03-31 PROBLEM — I82.409 DEEP VEIN THROMBOSIS: Status: ACTIVE | Noted: 2020-01-10

## 2023-03-31 NOTE — PHYSICAL EXAM
[Well Developed] : well developed [Well Nourished] : well nourished [Normal] : normal S1, S2, no murmur, no rub, no gallop [Normal S1, S2] : normal S1, S2 [de-identified] : no jvd [de-identified] : +III/VI diastolic murmur, tachycardic

## 2023-03-31 NOTE — HISTORY OF PRESENT ILLNESS
[FreeTextEntry1] : Mr. Baez is a 71 year old man with past medical history of a nonischemic cardiomyopathy and chronic systolic heart failure s/p HM2 LVAD in June 2017 complicated by recurrent GI hemorrhage and possible pump thrombosis who underwent heart transplant on 2/23/18 with a hepatitis C positive donor. His course was complicated by bilateral IJ/subclavian thrombi, a persistent small right sided pleural effusion, as well as acute on chronic renal failure of unclear etiology. In addition, his post-operative course was complicated by graft dysfunction of unclear etiology and persistent C4d positive staining on endomyocardial biopsy with negative DSAs. He developed joshua evidence of graft dysfunction leading to treatment with plasmapheresis, IVIG, and rituximab. Subsequently in June of 2018 he was found to have an pneumonia, that was ultimately diagnosed as Nocardia. This was successfully treated with therapy completed in August 2019.\par \par In the spring 2020, he was admitted with hemolytic anemia of unclear etiology. There were no clear precipitating factors, such as infection. He was treated with prednisone and Rituximab. Given the potential for CNI inhibitors leading to hemolytic anemia, we transitioned him to everolimus and he remained on high dose prednisone. He was subsequently admitted with acute anemia (not hemolytic). He developed severe leukopenia and Klebsiella bacteremia. Following treatment of this, he developed a severe C. diff colitis infection leading to a prolonged recovery. He was weaned to lower dose prednisone and the everolimus was transitioned back to tacrolimus. At the time of discharge he was found to have low levels of CMV viremia and was started back on valganciclovir. 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 Discharged home and had been following closely with heme/onc.\par \par Pt readmitted 3/3-3/18/23 who presented to [facility] ER on with new onset of chest tightness, found to be in Aflutter/fib. Hospital course c/b by URI w/ human metapneumovirus, normal LV function, acute on chronic kidney injury likely 2/2 hypovolemia, worsening leukocytosis with CT chest c/f PNA and hyperglycemia. Heme/onc consulted for hemolytic anemia management and prednisone dosing reduced to 60mg daily. Endocrine consulted for new onset of hyperglycemia and insulin teaching.  Bacterial infection-work up negative to date however had an elevated Fungitell.  Treated with broad spectrum abx from 3/3 to 3/8 (zosyn then cefepime).  Pt was cleared for discharge home on 3/18.\par \par Pt here today for follow up post discharge clinic visit. Since being home he reports doing ok. He is not checking fingersticks regularily or keeping records of FS or vital signs. He continues to be able to walk a few blocks with his cane. Has a HHA ~25 hours/week. Denied SOB, CP, orthopnea, cough, or swelling in the legs.  He has no fevers, chills, sweats, dysuria, hematuria, or headaches.  Appetite good, and sleeping well.  \par \par Health maintenance:\par DEXA 5/2019, 6/2021: osteopenia, 6/2022: worsening osteopenia. Followed by Dr. Clemens.\par CT Colon (4/2017) IMPRESSION: No colonic polyp or mass.\par PSA 6.76 ( 4/29/2022)\par HgA1c 4.7% (2/17/23)

## 2023-03-31 NOTE — CARDIOLOGY SUMMARY
[de-identified] : DSE 12/22/2020: Conclusions:\par 1. Normal hemodynamic response.\par 2. Normal electrocardiographic response.\par 3. Overall preserved left ventricular systolic function\par with mild hypokinesis of the mid to distal anterior wall at\par baseline. Normal augmentation in left ventricular systolic\par function with dobutamine infusion.\par 4. No evidence of inducible ischemia on stress\par echocardiogram images.\par *** Compared with echocardiogram of 12/2/2020, overall\par preserved left ventricular systolic function at baseline\par with mild hypokiesis of the mid to distal anterior wall.\par Normal augmentation in left ventricular systolic function\par with dobutamine infusion.\par \par 2/20/20: DSE EF 50-55% no evidence of inducible ischemia on pharmacologic stress echo images [de-identified] : \par TTE 3/3/23: LVIDd 3.6, EF 50-55%, concentric remodeling \par TTE 7/25/22: EF 60%, LVIDD 4.5cm, normal LV function, normal RV size with decreased RV function, mild TR, no pericardial effusion\par \par TTE 2/28/22: EF 65% LVIDD not calculated. normal biV function. 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 minimal TR. no pericardial effusion. [de-identified] : \par CT chest/abd/pelvis 3/14/23: IMPRESSION:\par Chest: Improving aeration of previously impacted right lower lobe distal  airways with improving bibasilar atelectasis. Additionally, there is a \par small clustered area of unchanged nodular opacities which may represent \par superimposed infection or inflammation. 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  No additional coronary-cameral fistulae noted. 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 IVUS performed of the RCA\par which was normal. Normal RHC\par \par 12/2/2020: RHC/Biopsy ISHLT Grade 0R\par 11/18/2020:  L/RHC w/ IVUS:\par CORONARY VESSELS: The coronary circulation is right dominant.\par LM:   --  LM: The vessel was very large sized. Angiography showed\par aneurysmal dilatation.\par LAD:   --  Proximal LAD: The vessel was very large sized. Angiography\par showed aneurysmal dilatation.\par --  Mid LAD: The vessel was very large sized and excessively ectatic. A\par fistula to the left ventricle was identified.\par --  Distal LAD: Normal. 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 No significant intimal thickening/hyperplasia or\par plaque was observed. [de-identified] : 3/15/23: b/l LE duplex\par IMPRESSION: There is acute below the knee DVT affecting the right soleal and the left soleal and gastrocnemius veins.

## 2023-03-31 NOTE — DISCUSSION/SUMMARY
[___ Week(s)] : in [unfilled] week(s) [FreeTextEntry1] : Mr Beaz is 71 y/o M five yrs post heart transplant being seen for f/u post hospitalization.\par \par # s/p OHT in 2/23/18\par - continue home Toprol  mg PO QD; losartan on hold d/t renal dysfunction \par - continue home ASA 81 and  atorvastatin 10 mg PO QD\par - continue home pantoprazole 40 mg PO QD  \par - d/c diltiazem as tachycardia is common post-transplant\par - Elevated Lactate, that downtrended\par \par # Immunosuppression\par - pulm consulted for bronch but not indicated per them\par - continue home bactrim, valganciclovir, antifungal ppx\par - Continue Tacrolimus 2.5mg BID for now , goal 6-8\par - f/u transplant ID recs for prophylaxis: bactrim SS daily and valcyte 450mg po daily\par \par # Atrial flutter. \par - followed by EP while hospitalized\par - remains in sinus \par - continue eliquis 2.5mg bid \par \par #Hemolytic anemia. \par - appreciate heme/onc consult for hemolytic anemia management and prednisone dosing given concern for oppurtunistic infection\par - currently on pred 60 mg daily \par \par #hyperglycemia\par - follow up with endocrine\par - continue insulin\par - reinforced diabetic diet teaching as glucose not well controlled.\par \par #hMPV pneumonia \par -R/o superimposed bacterial infection- work up negative to date  \par - f/u ID outpatient\par - continue ppx as above\par \par #hyperkalemia\par - continue veltessa\par - educated about low K diet\par - f/u weekly BMP and prn\par \par f/u DR Campbell in 2 weeks\par Time spent with patient 35 minutes

## 2023-04-03 ENCOUNTER — NON-APPOINTMENT (OUTPATIENT)
Age: 73
End: 2023-04-03

## 2023-04-03 ENCOUNTER — APPOINTMENT (OUTPATIENT)
Dept: HEART FAILURE | Facility: CLINIC | Age: 73
End: 2023-04-03
Payer: MEDICARE

## 2023-04-03 VITALS
DIASTOLIC BLOOD PRESSURE: 81 MMHG | TEMPERATURE: 97.5 F | HEART RATE: 99 BPM | BODY MASS INDEX: 28.32 KG/M2 | SYSTOLIC BLOOD PRESSURE: 118 MMHG | WEIGHT: 170 LBS | HEIGHT: 65 IN | OXYGEN SATURATION: 97 %

## 2023-04-03 PROCEDURE — 93000 ELECTROCARDIOGRAM COMPLETE: CPT

## 2023-04-03 PROCEDURE — 99215 OFFICE O/P EST HI 40 MIN: CPT | Mod: 25

## 2023-04-03 RX ORDER — BLOOD PRESSURE TEST KIT-LARGE
KIT MISCELLANEOUS
Qty: 1 | Refills: 0 | Status: ACTIVE | COMMUNITY
Start: 2023-04-03 | End: 1900-01-01

## 2023-04-03 NOTE — HISTORY OF PRESENT ILLNESS
[FreeTextEntry1] : Mr. Baez is a 71 year old man with past medical history of a nonischemic cardiomyopathy and chronic systolic heart failure s/p HM2 LVAD in June 2017 complicated by recurrent GI hemorrhage and possible pump thrombosis who underwent heart transplant on 2/23/18 with a hepatitis C positive donor. His course was complicated by bilateral IJ/subclavian thrombi, a persistent small right sided pleural effusion, as well as acute on chronic renal failure of unclear etiology. In addition, his post-operative course was complicated by graft dysfunction of unclear etiology and persistent C4d positive staining on endomyocardial biopsy with negative DSAs. He developed joshua evidence of graft dysfunction leading to treatment with plasmapheresis, IVIG, and rituximab. Subsequently in June of 2018 he was found to have an pneumonia, that was ultimately diagnosed as Nocardia. This was successfully treated with therapy completed in August 2019.\par \par In the spring 2020, he was admitted with hemolytic anemia of unclear etiology. There were no clear precipitating factors, such as infection. He was treated with prednisone and Rituximab. Given the potential for CNI inhibitors leading to hemolytic anemia, we transitioned him to everolimus and he remained on high dose prednisone. He was subsequently admitted with acute anemia (not hemolytic). He developed severe leukopenia and Klebsiella bacteremia. Following treatment of this, he developed a severe C. diff colitis infection leading to a prolonged recovery. He was weaned to lower dose prednisone and the everolimus was transitioned back to tacrolimus. At the time of discharge he was found to have low levels of CMV viremia and was started back on valganciclovir. 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 Discharged home and had been following closely with heme/onc.\par \par Pt readmitted 3/3-3/18/23 who presented to [facility] ER on with new onset of chest tightness, found to be in Aflutter/fib. Hospital course c/b by URI w/ human metapneumovirus, normal LV function, acute on chronic kidney injury likely 2/2 hypovolemia, worsening leukocytosis with CT chest c/f PNA and hyperglycemia. Heme/onc consulted for hemolytic anemia management and prednisone dosing reduced to 60mg daily. Endocrine consulted for new onset of hyperglycemia and insulin teaching.  Bacterial infection-work up negative to date however had an elevated Fungitell.  Treated with broad spectrum abx from 3/3 to 3/8 (zosyn then cefepime).  Pt was cleared for discharge home on 3/18.\par \par Pt here today for follow up clinic visit. Overall pt reports doing ok. He is still not checking fingersticks 4x/daily nor keeping records of FS or vital signs. He continues to be able to walk 2 blocks with his cane. His right middle finger is swollen and has minor drainage, currently wrapped with gauze. Has a HHA ~25 hours/week. Denied SOB, CP, orthopnea, cough, or swelling in the legs.  He has no fevers, chills, sweats, dysuria, hematuria, or headaches.  Appetite good, and sleeping well.  \par \par Health maintenance:\par DEXA 5/2019, 6/2021: osteopenia, 6/2022: worsening osteopenia. Followed by Dr. Clemens.\par CT Colon (4/2017) IMPRESSION: No colonic polyp or mass.\par PSA 6.76 ( 4/29/2022)\par HgA1c 4.7% (2/17/23)

## 2023-04-03 NOTE — PHYSICAL EXAM
[Well Developed] : well developed [Well Nourished] : well nourished [Normal] : normal S1, S2, no murmur, no rub, no gallop [Normal S1, S2] : normal S1, S2 [de-identified] : no jvd [de-identified] : +III/VI diastolic murmur, tachycardic

## 2023-04-03 NOTE — DISCUSSION/SUMMARY
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 \par hemolytic anemia/recent thrombocytopenia. Admission in feb for recurrent hem anemia. d/c on pred 75mg. Follow by Dr Rosario. \par intolerant of cellcept due leukopenia. 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 everolimus d/c for that reason. \par Annual Feb 2023: no obstructive CAD. 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 \par Last Bx Feb 2022 OR.  C4D 50%. +1 staining.No histopath features of AMR. No Hx  DSAs. \par last allosure Jan 2023.  < .12. \par Baseline renal function prior to most recent admission 1.4. \par  Followed by Dr sparks's for  osteoperosis. Initiated on prolia Q 6mths.   \par Seen by rheumatology one year ago for tophi/gout. \par Admitted: March 3-18. Chest tightness. Aflutter. CT scan pneumonia. Viral panel - human metapneumovirus. Zocyn, cefipime. New onset hyperglycemia. Initiated on insulin. Seen by hem and pred reduced to 60mg. bilat LE DVTs on eloquis. \par meds: toprol 200, losartan 25 QD, prograf 2.5/2.5 bid ( 17, 3.9), pred 60, bactrim SS, valctye 450 QD, nystatin,  PPI, asa, insulin, valtessa, prava 20. eloquis 5 bid \par feeling well. walks 2 blocks. no LE swelling \par 118/81, afeb 99, 170lbs (decrease 5) \par no edema. no JVP. lungs clear. min LE edema. loud holosytolic murmer all across precordium \par EKG SR\par TTE 3.3: LVEf 50-55, LVEDD 3.6, normal RV. mild TR. no PE. \par recent labs 3.31: WBC 13, Hb 11, Plt 106, K 5.1, Cl 107, bi 23, G 272, BUN 52, Cr 2.0. Mg 1.9 Prograf 17\par Patient with multiple medical issues and intermittent compliance. \par Did not show for his endocrine f/u. \par Plan:\par Need well timed prograf level this week. \par check cystain. \par Increase valtressa\par Needs f/u with cardiorenal group. . \par endocrine f/u\par f/u Dr Rosario. \par NP visit 3 weeks \par Marvin Campbell \par 60 mins spent with patient and chart \par \par \par

## 2023-04-03 NOTE — CARDIOLOGY SUMMARY
[de-identified] : DSE 12/22/2020: Conclusions:\par 1. Normal hemodynamic response.\par 2. Normal electrocardiographic response.\par 3. Overall preserved left ventricular systolic function\par with mild hypokinesis of the mid to distal anterior wall at\par baseline. Normal augmentation in left ventricular systolic\par function with dobutamine infusion.\par 4. No evidence of inducible ischemia on stress\par echocardiogram images.\par *** Compared with echocardiogram of 12/2/2020, overall\par preserved left ventricular systolic function at baseline\par with mild hypokiesis of the mid to distal anterior wall.\par Normal augmentation in left ventricular systolic function\par with dobutamine infusion.\par \par 2/20/20: DSE EF 50-55% no evidence of inducible ischemia on pharmacologic stress echo images [de-identified] : \par TTE 3/3/23: LVIDd 3.6, EF 50-55%, concentric remodeling \par TTE 7/25/22: EF 60%, LVIDD 4.5cm, normal LV function, normal RV size with decreased RV function, mild TR, no pericardial effusion\par \par TTE 2/28/22: EF 65% LVIDD not calculated. normal biV function. 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 minimal TR. no pericardial effusion. [de-identified] : \par CT chest/abd/pelvis 3/14/23: IMPRESSION:\par Chest: Improving aeration of previously impacted right lower lobe distal  airways with improving bibasilar atelectasis. Additionally, there is a \par small clustered area of unchanged nodular opacities which may represent \par superimposed infection or inflammation. 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  No additional coronary-cameral fistulae noted. 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 IVUS performed of the RCA\par which was normal. Normal RHC\par \par 12/2/2020: RHC/Biopsy ISHLT Grade 0R\par 11/18/2020:  L/RHC w/ IVUS:\par CORONARY VESSELS: The coronary circulation is right dominant.\par LM:   --  LM: The vessel was very large sized. Angiography showed\par aneurysmal dilatation.\par LAD:   --  Proximal LAD: The vessel was very large sized. Angiography\par showed aneurysmal dilatation.\par --  Mid LAD: The vessel was very large sized and excessively ectatic. A\par fistula to the left ventricle was identified.\par --  Distal LAD: Normal. 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 No significant intimal thickening/hyperplasia or\par plaque was observed. [de-identified] : 3/15/23: b/l LE duplex\par IMPRESSION: There is acute below the knee DVT affecting the right soleal and the left soleal and gastrocnemius veins.

## 2023-04-04 ENCOUNTER — NON-APPOINTMENT (OUTPATIENT)
Age: 73
End: 2023-04-04

## 2023-04-05 ENCOUNTER — NON-APPOINTMENT (OUTPATIENT)
Age: 73
End: 2023-04-05

## 2023-04-05 LAB
CULTURE RESULTS: SIGNIFICANT CHANGE UP
SPECIMEN SOURCE: SIGNIFICANT CHANGE UP

## 2023-04-06 NOTE — PROVIDER CONTACT NOTE (CRITICAL VALUE NOTIFICATION) - NS PROVIDER READ BACK
medications administered in the ED:  Medications   fluorescein ophthalmic strip 1 each (has no administration in time range)   tetracaine (TETRAVISC) 0.5 % ophthalmic solution 2 drop (has no administration in time range)           ED Course: This patient's ED course included: a personal history and physicial examination and re-evaluation prior to disposition    This patient has remained hemodynamically stable during their ED course. Tetracaine was applied. Fluorescein was applied. Foreign body was appreciated to the 8 o'clock position on the eye. No corneal abrasions or ulcerations appreciated. I attempted to remove the foreign body with a Q-tip and was unsuccessful. I then used a needle. I was unable to get the foreign body to come out. I contacted Dr. John Cheek office. They would like the patient sent over and will work him in today. I discussed the plan of care with the patient and guards. They are agreeable with plan of care. Guards states that they will take the patient over to Dr. John Cheek office now. Re-Evaluations:         Consultations:        Critical Care: none    Counseling: The emergency provider has spoken with the patient and discussed todays results, in addition to providing specific details for the plan of care and counseling regarding the diagnosis and prognosis. Questions are answered at this time and they are agreeable with the plan.       --------------------------------- IMPRESSION AND DISPOSITION ---------------------------------    IMPRESSION  1. Eye foreign body, left, initial encounter        DISPOSITION  Disposition: Discharge to ophthalmology  Patient condition is stable        NOTE: This report was transcribed using voice recognition software.  Every effort was made to ensure accuracy; however, inadvertent computerized transcription errors may be present        Rosemarie Barker MD  04/06/23 [id number] yes

## 2023-04-07 ENCOUNTER — APPOINTMENT (OUTPATIENT)
Dept: HEMATOLOGY ONCOLOGY | Facility: CLINIC | Age: 73
End: 2023-04-07

## 2023-04-07 ENCOUNTER — RESULT REVIEW (OUTPATIENT)
Age: 73
End: 2023-04-07

## 2023-04-07 ENCOUNTER — APPOINTMENT (OUTPATIENT)
Dept: HEMATOLOGY ONCOLOGY | Facility: CLINIC | Age: 73
End: 2023-04-07
Payer: MEDICARE

## 2023-04-07 VITALS
TEMPERATURE: 97.2 F | HEART RATE: 91 BPM | BODY MASS INDEX: 28.65 KG/M2 | OXYGEN SATURATION: 98 % | WEIGHT: 172.18 LBS | DIASTOLIC BLOOD PRESSURE: 87 MMHG | RESPIRATION RATE: 16 BRPM | SYSTOLIC BLOOD PRESSURE: 128 MMHG

## 2023-04-07 LAB
BASOPHILS # BLD AUTO: 0.02 K/UL — SIGNIFICANT CHANGE UP (ref 0–0.2)
BASOPHILS NFR BLD AUTO: 0.2 % — SIGNIFICANT CHANGE UP (ref 0–2)
EOSINOPHIL # BLD AUTO: 0 K/UL — SIGNIFICANT CHANGE UP (ref 0–0.5)
EOSINOPHIL NFR BLD AUTO: 0 % — SIGNIFICANT CHANGE UP (ref 0–6)
HCT VFR BLD CALC: 38.9 % — LOW (ref 39–50)
HGB BLD-MCNC: 12.5 G/DL — LOW (ref 13–17)
IMM GRANULOCYTES NFR BLD AUTO: 1.8 % — HIGH (ref 0–0.9)
LYMPHOCYTES # BLD AUTO: 0.78 K/UL — LOW (ref 1–3.3)
LYMPHOCYTES # BLD AUTO: 6.3 % — LOW (ref 13–44)
MCHC RBC-ENTMCNC: 32.1 G/DL — SIGNIFICANT CHANGE UP (ref 32–36)
MCHC RBC-ENTMCNC: 33.5 PG — SIGNIFICANT CHANGE UP (ref 27–34)
MCV RBC AUTO: 104.3 FL — HIGH (ref 80–100)
MONOCYTES # BLD AUTO: 0.32 K/UL — SIGNIFICANT CHANGE UP (ref 0–0.9)
MONOCYTES NFR BLD AUTO: 2.6 % — SIGNIFICANT CHANGE UP (ref 2–14)
NEUTROPHILS # BLD AUTO: 10.99 K/UL — HIGH (ref 1.8–7.4)
NEUTROPHILS NFR BLD AUTO: 89.1 % — HIGH (ref 43–77)
NRBC # BLD: 0 /100 WBCS — SIGNIFICANT CHANGE UP (ref 0–0)
PLATELET # BLD AUTO: 126 K/UL — LOW (ref 150–400)
RBC # BLD: 3.73 M/UL — LOW (ref 4.2–5.8)
RBC # FLD: 16.5 % — HIGH (ref 10.3–14.5)
RETICS #: 127.2 K/UL — HIGH (ref 25–125)
RETICS/RBC NFR: 3.4 % — HIGH (ref 0.5–2.5)
WBC # BLD: 12.33 K/UL — HIGH (ref 3.8–10.5)
WBC # FLD AUTO: 12.33 K/UL — HIGH (ref 3.8–10.5)

## 2023-04-07 PROCEDURE — 99213 OFFICE O/P EST LOW 20 MIN: CPT

## 2023-04-07 RX ORDER — TACROLIMUS 0.5 MG/1
0.5 CAPSULE ORAL TWICE DAILY
Qty: 60 | Refills: 5 | Status: DISCONTINUED | COMMUNITY
Start: 2023-03-10 | End: 2023-04-07

## 2023-04-07 RX ORDER — PREDNISONE 20 MG/1
20 TABLET ORAL DAILY
Qty: 90 | Refills: 5 | Status: DISCONTINUED | COMMUNITY
Start: 2023-01-25 | End: 2023-04-07

## 2023-04-10 LAB
ALBUMIN SERPL ELPH-MCNC: 3.8 G/DL
ALP BLD-CCNC: 74 U/L
ALT SERPL-CCNC: 53 U/L
ANION GAP SERPL CALC-SCNC: 13 MMOL/L
AST SERPL-CCNC: 29 U/L
BILIRUB SERPL-MCNC: 0.7 MG/DL
BUN SERPL-MCNC: 46 MG/DL
CALCIUM SERPL-MCNC: 9.2 MG/DL
CHLORIDE SERPL-SCNC: 106 MMOL/L
CO2 SERPL-SCNC: 22 MMOL/L
CREAT SERPL-MCNC: 1.88 MG/DL
EGFR: 37 ML/MIN/1.73M2
GLUCOSE SERPL-MCNC: 119 MG/DL
POTASSIUM SERPL-SCNC: 5.5 MMOL/L
PROT SERPL-MCNC: 5.7 G/DL
SODIUM SERPL-SCNC: 141 MMOL/L

## 2023-04-13 NOTE — HISTORY OF PRESENT ILLNESS
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 Treatment - prednisone/Rituxan x 1\par 8/2022 -- relapse with thrombocytopenia as well  (Grayson's syndrome)\par 2/2018 Cardiac transplant\par Bilateral subclavian thrombosis\par UGI bleed [de-identified] : Was hospitalzed in 3/2023.  Was noted to have new onset aflutter which converted to sinus tachycardia; was started on Diltiazem with improvement in HR.  \par Patient with RVP positive for Human MetaPneumovirus and CT scan with collapsed RLL and RML. Pulmonary consulted for possible bronchoscopy however given patient was comfortable on room air, they did not feel he warranted a bronch. Procalcitonin and fungitell also elevated however sputum culture, PCP testing, aspergillosis testing, histoplasmosis, blood cultures all negative. Patient was treated with a 7 day course of cefepime with improvement of symptoms. \par Patient also with hyperglycemia in setting of steroid use for AIHA. Steroids decreased from 75 mg to 60 mg and insulin regimen adjusted accordingly. Patient eventually discharged on lantus 18, admelog 26-10-7 with meals. Pt remains on Prednisone 60 mg daily.  Since home, pt reports feeling well.  Denies fevers, chills, night sweats, palpitations, dyspnea, pain, bleeding.   \par \par \par

## 2023-04-13 NOTE — PHYSICAL EXAM
[Restricted in physically strenuous activity but ambulatory and able to carry out work of a light or sedentary nature] : Status 1- Restricted in physically strenuous activity but ambulatory and able to carry out work of a light or sedentary nature, e.g., light house work, office work [Normal] : affect appropriate [de-identified] : RRR. S1S2 normal. Gr 2/6 holosystolic and holodiastolic murmur LLSB. No gallops.

## 2023-04-13 NOTE — CONSULT LETTER
[Dear  ___] : Dear  [unfilled], [Courtesy Letter:] : I had the pleasure of seeing your patient, [unfilled], in my office today. [Please see my note below.] : Please see my note below. [Sincerely,] : Sincerely, [DrEduar  ___] : Dr. HAN [FreeTextEntry2] : Dr. Lisa Ac [FreeTextEntry3] : Tao Rosario M.D., FACP\par Professor of Medicine\par [employer] [facility] [employer]\par Associate Chief, Division of Hematology\par [facility]\par [facility]\par [address] [place] [address]\[place] [address]\par [phone]\par \par \par \par

## 2023-04-13 NOTE — ASSESSMENT
[Palliative Care Plan] : not applicable at this time [FreeTextEntry1] : 73 YO M S/P cardiac transplant developed mixed type autoimmune hemolytic anemia while on immunosuppression. Was in remission with well compensated hemolytic anemia on low dose prednisone, but relapsed after tapered to 3 mg daily. 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  Was hospitalized with aflutter, now taking Diltiazem. Also found to have metapneumovirus but no intervention was needed.  Now getting insulin coverage for elevated sugar levels.\par \par Plan:\par Decrease Prednisone to 40 mg daily \par PCP prophylaxis as per Cardiology- is taking Bactrim\par CMP, LDH\par Tacrolimus/ ASA per Cardiology.\par Folic acid\par To ER prn \par RTC in 2 weeks\par

## 2023-04-17 NOTE — PROGRESS NOTE ADULT - PROBLEM SELECTOR PLAN 4
CMV viral load detectable at 256 on 9/17. Repeat pending.  For the moment we will treat with Valcyte 450 mg daily with close monitoring. Cheiloplasty (Less Than 50%) Text: A decision was made to reconstruct the defect with a  cheiloplasty.  The defect was undermined extensively.  Additional orbicularis oris muscle was excised with a 15 blade scalpel.  The defect was converted into a full thickness wedge, of less than 50% of the vertical height of the lip, to facilite a better cosmetic result.  Small vessels were then tied off with 5-0 monocyrl. The orbicularis oris, superficial fascia, adipose and dermis were then reapproximated.  After the deeper layers were approximated the epidermis was reapproximated with particular care given to realign the vermilion border.

## 2023-04-19 ENCOUNTER — NON-APPOINTMENT (OUTPATIENT)
Age: 73
End: 2023-04-19

## 2023-04-22 LAB
CULTURE RESULTS: SIGNIFICANT CHANGE UP
SPECIMEN SOURCE: SIGNIFICANT CHANGE UP

## 2023-04-24 ENCOUNTER — NON-APPOINTMENT (OUTPATIENT)
Age: 73
End: 2023-04-24

## 2023-04-24 ENCOUNTER — APPOINTMENT (OUTPATIENT)
Dept: HEART FAILURE | Facility: CLINIC | Age: 73
End: 2023-04-24

## 2023-04-25 ENCOUNTER — APPOINTMENT (OUTPATIENT)
Dept: HEMATOLOGY ONCOLOGY | Facility: CLINIC | Age: 73
End: 2023-04-25
Payer: MEDICARE

## 2023-04-25 ENCOUNTER — RESULT REVIEW (OUTPATIENT)
Age: 73
End: 2023-04-25

## 2023-04-25 VITALS
RESPIRATION RATE: 16 BRPM | OXYGEN SATURATION: 96 % | SYSTOLIC BLOOD PRESSURE: 125 MMHG | BODY MASS INDEX: 29.42 KG/M2 | HEIGHT: 65 IN | DIASTOLIC BLOOD PRESSURE: 89 MMHG | HEART RATE: 101 BPM | WEIGHT: 176.59 LBS | TEMPERATURE: 97.1 F

## 2023-04-25 LAB
ANISOCYTOSIS BLD QL: SLIGHT — SIGNIFICANT CHANGE UP
BASOPHILS # BLD AUTO: 0 K/UL — SIGNIFICANT CHANGE UP (ref 0–0.2)
BASOPHILS NFR BLD AUTO: 0 % — SIGNIFICANT CHANGE UP (ref 0–2)
EOSINOPHIL # BLD AUTO: 0 K/UL — SIGNIFICANT CHANGE UP (ref 0–0.5)
EOSINOPHIL NFR BLD AUTO: 0 % — SIGNIFICANT CHANGE UP (ref 0–6)
HCT VFR BLD CALC: 30.2 % — LOW (ref 39–50)
HGB BLD-MCNC: 10.2 G/DL — LOW (ref 13–17)
LYMPHOCYTES # BLD AUTO: 1.3 K/UL — SIGNIFICANT CHANGE UP (ref 1–3.3)
LYMPHOCYTES # BLD AUTO: 13 % — SIGNIFICANT CHANGE UP (ref 13–44)
MCHC RBC-ENTMCNC: 33.7 PG — SIGNIFICANT CHANGE UP (ref 27–34)
MCHC RBC-ENTMCNC: 33.8 G/DL — SIGNIFICANT CHANGE UP (ref 32–36)
MCV RBC AUTO: 99.7 FL — SIGNIFICANT CHANGE UP (ref 80–100)
MONOCYTES # BLD AUTO: 0.3 K/UL — SIGNIFICANT CHANGE UP (ref 0–0.9)
MONOCYTES NFR BLD AUTO: 3 % — SIGNIFICANT CHANGE UP (ref 2–14)
MYELOCYTES NFR BLD: 2 % — HIGH (ref 0–0)
NEUTROPHILS # BLD AUTO: 8.18 K/UL — HIGH (ref 1.8–7.4)
NEUTROPHILS NFR BLD AUTO: 82 % — HIGH (ref 43–77)
NRBC # BLD: 6 /100 — HIGH (ref 0–0)
NRBC # BLD: SIGNIFICANT CHANGE UP /100 WBCS (ref 0–0)
PLAT MORPH BLD: NORMAL — SIGNIFICANT CHANGE UP
PLATELET # BLD AUTO: 149 K/UL — LOW (ref 150–400)
POIKILOCYTOSIS BLD QL AUTO: SLIGHT — SIGNIFICANT CHANGE UP
POLYCHROMASIA BLD QL SMEAR: SLIGHT — SIGNIFICANT CHANGE UP
RBC # BLD: 3.03 M/UL — LOW (ref 4.2–5.8)
RBC # FLD: 18.2 % — HIGH (ref 10.3–14.5)
RBC BLD AUTO: ABNORMAL
SCHISTOCYTES BLD QL AUTO: SLIGHT — SIGNIFICANT CHANGE UP
STOMATOCYTES BLD QL SMEAR: SLIGHT — SIGNIFICANT CHANGE UP
WBC # BLD: 9.98 K/UL — SIGNIFICANT CHANGE UP (ref 3.8–10.5)
WBC # FLD AUTO: 9.98 K/UL — SIGNIFICANT CHANGE UP (ref 3.8–10.5)

## 2023-04-25 PROCEDURE — 99213 OFFICE O/P EST LOW 20 MIN: CPT

## 2023-04-27 ENCOUNTER — APPOINTMENT (OUTPATIENT)
Dept: INFECTIOUS DISEASE | Facility: CLINIC | Age: 73
End: 2023-04-27
Payer: MEDICARE

## 2023-04-27 DIAGNOSIS — Z20.822 CONTACT WITH AND (SUSPECTED) EXPOSURE TO COVID-19: ICD-10-CM

## 2023-04-27 PROCEDURE — 99443: CPT

## 2023-04-28 ENCOUNTER — OUTPATIENT (OUTPATIENT)
Dept: OUTPATIENT SERVICES | Facility: HOSPITAL | Age: 73
LOS: 1 days | Discharge: HOME | End: 2023-04-28

## 2023-04-28 DIAGNOSIS — D64.9 ANEMIA, UNSPECIFIED: ICD-10-CM

## 2023-04-28 DIAGNOSIS — Z94.1 HEART TRANSPLANT STATUS: Chronic | ICD-10-CM

## 2023-04-28 LAB
ALBUMIN SERPL ELPH-MCNC: 4.2 G/DL
ALP BLD-CCNC: 68 U/L
ALT SERPL-CCNC: 41 U/L
ANION GAP SERPL CALC-SCNC: 14 MMOL/L
AST SERPL-CCNC: 35 U/L
BILIRUB SERPL-MCNC: 0.9 MG/DL
BUN SERPL-MCNC: 35 MG/DL
CALCIUM SERPL-MCNC: 8.3 MG/DL
CHLORIDE SERPL-SCNC: 102 MMOL/L
CO2 SERPL-SCNC: 21 MMOL/L
CREAT SERPL-MCNC: 1.52 MG/DL
EGFR: 48 ML/MIN/1.73M2
GLUCOSE SERPL-MCNC: 208 MG/DL
POTASSIUM SERPL-SCNC: 5.3 MMOL/L
PROT SERPL-MCNC: 5.8 G/DL
SODIUM SERPL-SCNC: 137 MMOL/L

## 2023-04-29 PROBLEM — Z20.822 CLOSE EXPOSURE TO COVID-19 VIRUS: Status: ACTIVE | Noted: 2023-04-29

## 2023-04-29 NOTE — PROGRESS NOTE ADULT - SUBJECTIVE AND OBJECTIVE BOX
Chief Complaint:  melena       Interval Events: Afebrile. No abdominal pain/nausea/vomiting. Small BM yesterday - no melena/hematochezia     Allergies:  No Known Allergies      Home Medications:    Hospital Medications:  sodium chloride 0.9% lock flush 3 milliLiter(s) IV Push every 8 hours  spironolactone 25 milliGRAM(s) Oral daily  amiodarone    Tablet 200 milliGRAM(s) Oral daily  acetaminophen   Tablet 650 milliGRAM(s) Oral every 6 hours PRN  mexiletine 150 milliGRAM(s) Oral two times a day  diphenhydrAMINE   Capsule 25 milliGRAM(s) Oral every 4 hours PRN  colchicine 0.6 milliGRAM(s) Oral daily  QUEtiapine 50 milliGRAM(s) Oral at bedtime  carvedilol 6.25 milliGRAM(s) Oral every 12 hours  hydrocortisone 1% Cream 1 Application(s) Topical three times a day  furosemide    Tablet 20 milliGRAM(s) Oral daily  docusate sodium 100 milliGRAM(s) Oral three times a day  folic acid 1 milliGRAM(s) Oral daily  ascorbic acid 500 milliGRAM(s) Oral daily  pantoprazole  Injectable 40 milliGRAM(s) IV Push two times a day  heparin  Infusion 1200 Unit(s)/Hr IV Continuous <Continuous>      PMHX/PSHX:  GIB (gastrointestinal bleeding)  H/O prior ablation treatment  Ventricular fibrillation  PAF (paroxysmal atrial fibrillation)  Non-Ischemic Cardiomyopathy  SVT (Supraventricular Tachycardia)  HTN  CHF (Congestive Heart Failure)  LVAD (left ventricular assist device) present  Status post left hip replacement  Hypertension  Hypertension  History of Prior Ablation Treatment  AICD (Automatic Cardioverter/Defibrillator) Present      Family history:  No pertinent family history in first degree relatives      ROS: as per HPI       PHYSICAL EXAM:   Vital Signs:  Vital Signs Last 24 Hrs  T(C): 37 (2017 07:00), Max: 37 (2017 07:00)  T(F): 98.6 (2017 07:00), Max: 98.6 (2017 07:00)  HR: 77 (2017 07:00) (77 - 93)  BP: --  BP(mean): 80 (2017 07:00) (80 - 88)  RR: 18 (2017 07:00) (18 - 18)  SpO2: 100% (2017 07:00) (100% - 100%)  Daily     Daily Weight in k (2017 08:42)    GENERAL:  No acute distress  HEENT:  Anicteric, no thrush  CHEST:  Non-labored breathing, lungs clear b/l  HEART:  +s1, s2 heart sounds  ABDOMEN:  soft, nontender to palpation, no rebound/guarding, +bs  RECTAL: brown stool, no blood or melena  EXTREMITIES:  warm and well perfused, no edema  SKIN:  Warm, no edema  NEURO:  Awake interactive following commands    LABS:                        9.7    12.0  )-----------( 264      ( 2017 02:20 )             29.1     07-    135  |  103  |  26<H>  ----------------------------<  110<H>  4.3   |  21<L>  |  1.15    Ca    8.6      2017 05:09        PT/INR - ( 2017 05:09 )   PT: 17.4 sec;   INR: 1.60 ratio         PTT - ( 2017 02:20 )  PTT:64.7 sec        Imaging: Yes

## 2023-04-29 NOTE — HISTORY OF PRESENT ILLNESS
[Home] : at home, [unfilled] , at the time of the visit. [Medical Office: ([facility])___] : at the medical office located in  [Time Spent: ___ minutes] : I have spent [unfilled] minutes with the patient on the telephone [FreeTextEntry1] : Yoseph Baez is a 74yo man who underwent a heart transplant in 2/2018.\par \par \par He was hospitalized in 3/2023 with new onset a.flutter, fevers and a dry cough. His RVP was positive for Human metapenumovirus and his chest CT scan was notable for a collapsed RLL and RML. He was stable on room air and Pulmonary did feel a bronchoscopy was needed. His procalcitononnin and fungitell were elevated during the admission but sputum cultures , PJP PCR on sputum and fungal testing were negative. Blood cultures, urine histo antigen and legionella ag were also negative. He was treated with Cefepime x7 days for possible superimposed CAP with improvement in his symptoms.\par Patient had been receiving high dose prednisone for mixed type autoimmune hemolytic anemia and also received a dose of Rituxan 4/2020. In 8/202 he relapsed with thrombocytopenia and was restarted on high dose steroids 75mg/d which were decreased during hospital stay to 60mg a day.\par \par His steroid dose was decreased to 40mg/day of prednisone afew days ago\par Feeling well Denies fevers, chills, night sweats\par \par Most recent labs from a few days ago with nl plts and WBC\par \par At his next Cardiology visit next week would\par repeat fungitell\par send CMV viral load\par continue PJP prophylaxis with Bactrim given his chronic steroid dose

## 2023-05-01 LAB
CMV DNA SPEC QL NAA+PROBE: NOT DETECTED IU/ML
CMVPCR LOG: NOT DETECTED LOG10IU/ML
TACROLIMUS SERPL-MCNC: 3.8 NG/ML

## 2023-05-02 ENCOUNTER — APPOINTMENT (OUTPATIENT)
Dept: RHEUMATOLOGY | Facility: CLINIC | Age: 73
End: 2023-05-02

## 2023-05-02 NOTE — PHYSICAL EXAM
[Restricted in physically strenuous activity but ambulatory and able to carry out work of a light or sedentary nature] : Status 1- Restricted in physically strenuous activity but ambulatory and able to carry out work of a light or sedentary nature, e.g., light house work, office work [Normal] : affect appropriate [de-identified] : RRR. S1S2 normal. Gr 2/6 holosystolic and holodiastolic murmur LLSB. No gallops.

## 2023-05-02 NOTE — CONSULT LETTER
[Dear  ___] : Dear  [unfilled], [Courtesy Letter:] : I had the pleasure of seeing your patient, [unfilled], in my office today. [Please see my note below.] : Please see my note below. [Sincerely,] : Sincerely, [DrEduar  ___] : Dr. HAN [FreeTextEntry2] : Dr. Lisa Ac [FreeTextEntry3] : Tao Rosario M.D., FACP\par Professor of Medicine\par [employer] [facility] [employer]\par Associate Chief, Division of Hematology\par [facility]\par [facility]\par [address] [place] [address]\[place] [address]\par [phone]\par \par \par \par

## 2023-05-02 NOTE — HISTORY OF PRESENT ILLNESS
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 Treatment - prednisone/Rituxan x 1\par 8/2022 -- relapse with thrombocytopenia as well  (Grayson's syndrome)\par 2/2018 Cardiac transplant\par Bilateral subclavian thrombosis\par UGI bleed [de-identified] : Was hospitalzed in 3/2023.  Was noted to have new onset aflutter which converted to sinus tachycardia; was started on Diltiazem with improvement in HR.  Patient with RVP positive for Human MetaPneumovirus and CT scan with collapsed RLL and RML. Pulmonary consulted for possible bronchoscopy however given patient was comfortable on room air, they did not feel he warranted a bronch. Procalcitonin and fungitell also elevated however sputum culture, PCP testing, aspergillosis testing, histoplasmosis, blood cultures all negative. Patient was treated with a 7 day course of cefepime with improvement of symptoms. \par \par Prednisone was decreased to 40 mg daily.  Since home, pt reports feeling well.  Denies fatigue, fevers, chills, night sweats, palpitations, dyspnea, pain, bleeding.  Has been taking insulin daily and using coverage as needed.   \par \par \par

## 2023-05-02 NOTE — ASSESSMENT
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 Was in remission with well compensated hemolytic anemia on low dose prednisone, but relapsed after tapered to 3 mg daily. 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  Was hospitalized with aflutter, now taking Diltiazem. Also found to have metapneumovirus but no intervention was needed.  Now getting insulin coverage for elevated sugar levels. Hgb has dropped from 12.5 to 10.2.  Care discussed with Dr Rosario   Will monitor counts and consider alternate therapy.   \par \par Plan:\par Continue Prednisone to 40 mg daily \par PCP prophylaxis as per Cardiology- is taking Bactrim\par CMP, LDH\par Tacrolimus/ ASA per Cardiology.\par Folic acid\par To ER prn \par RTC in 2 weeks\par

## 2023-05-03 NOTE — PATIENT PROFILE ADULT. - AS SC BRADEN MOBILITY
Implemented All Universal Safety Interventions:  [place] to call system. Call bell, personal items and telephone within reach. Instruct patient to call for assistance. Room bathroom lighting operational. Non-slip footwear when patient is off stretcher. Physically safe environment: no spills, clutter or unnecessary equipment. Stretcher in lowest position, wheels locked, appropriate side rails in place.
(3) slightly limited

## 2023-05-04 ENCOUNTER — LABORATORY RESULT (OUTPATIENT)
Age: 73
End: 2023-05-04

## 2023-05-04 ENCOUNTER — APPOINTMENT (OUTPATIENT)
Dept: HEART FAILURE | Facility: CLINIC | Age: 73
End: 2023-05-04
Payer: MEDICARE

## 2023-05-04 VITALS
SYSTOLIC BLOOD PRESSURE: 107 MMHG | WEIGHT: 178 LBS | TEMPERATURE: 97.7 F | OXYGEN SATURATION: 97 % | HEART RATE: 117 BPM | DIASTOLIC BLOOD PRESSURE: 74 MMHG | HEIGHT: 65 IN | BODY MASS INDEX: 29.66 KG/M2

## 2023-05-04 PROCEDURE — 99214 OFFICE O/P EST MOD 30 MIN: CPT

## 2023-05-05 LAB — TACROLIMUS SERPL-MCNC: 4.9 NG/ML

## 2023-05-08 ENCOUNTER — EMERGENCY (EMERGENCY)
Facility: HOSPITAL | Age: 73
LOS: 1 days | Discharge: HOME | End: 2023-05-08
Attending: EMERGENCY MEDICINE
Payer: MEDICARE

## 2023-05-08 VITALS
RESPIRATION RATE: 18 BRPM | OXYGEN SATURATION: 97 % | SYSTOLIC BLOOD PRESSURE: 116 MMHG | HEIGHT: 67 IN | WEIGHT: 171.96 LBS | HEART RATE: 106 BPM | DIASTOLIC BLOOD PRESSURE: 80 MMHG | TEMPERATURE: 98 F

## 2023-05-08 DIAGNOSIS — Z94.1 HEART TRANSPLANT STATUS: Chronic | ICD-10-CM

## 2023-05-08 LAB
ALBUMIN SERPL ELPH-MCNC: 3.8 G/DL — SIGNIFICANT CHANGE UP (ref 3.3–5)
ALP SERPL-CCNC: 55 U/L — SIGNIFICANT CHANGE UP (ref 40–120)
ALT FLD-CCNC: 34 U/L — SIGNIFICANT CHANGE UP (ref 10–45)
ANION GAP SERPL CALC-SCNC: 13 MMOL/L — SIGNIFICANT CHANGE UP (ref 5–17)
ANISOCYTOSIS BLD QL: SLIGHT — SIGNIFICANT CHANGE UP
APTT BLD: 20.7 SEC — LOW (ref 27.5–35.5)
AST SERPL-CCNC: 39 U/L — SIGNIFICANT CHANGE UP (ref 10–40)
BASE EXCESS BLDV CALC-SCNC: -8.6 MMOL/L — LOW (ref -2–3)
BASOPHILS # BLD AUTO: 0 K/UL — SIGNIFICANT CHANGE UP (ref 0–0.2)
BASOPHILS NFR BLD AUTO: 0 % — SIGNIFICANT CHANGE UP (ref 0–2)
BILIRUB SERPL-MCNC: 0.6 MG/DL — SIGNIFICANT CHANGE UP (ref 0.2–1.2)
BLD GP AB SCN SERPL QL: NEGATIVE — SIGNIFICANT CHANGE UP
BUN SERPL-MCNC: 52 MG/DL — HIGH (ref 7–23)
CA-I SERPL-SCNC: 1.13 MMOL/L — LOW (ref 1.15–1.33)
CALCIUM SERPL-MCNC: 9.4 MG/DL — SIGNIFICANT CHANGE UP (ref 8.4–10.5)
CHLORIDE BLDV-SCNC: 114 MMOL/L — HIGH (ref 96–108)
CHLORIDE SERPL-SCNC: 110 MMOL/L — HIGH (ref 96–108)
CO2 BLDV-SCNC: 19 MMOL/L — LOW (ref 22–26)
CO2 SERPL-SCNC: 18 MMOL/L — LOW (ref 22–31)
CREAT SERPL-MCNC: 2.16 MG/DL — HIGH (ref 0.5–1.3)
DACRYOCYTES BLD QL SMEAR: SLIGHT — SIGNIFICANT CHANGE UP
DAT C3-SP REAG RBC QL: NEGATIVE — SIGNIFICANT CHANGE UP
EGFR: 32 ML/MIN/1.73M2 — LOW
ELUATE ANTIBODY 1: SIGNIFICANT CHANGE UP
EOSINOPHIL # BLD AUTO: 0 K/UL — SIGNIFICANT CHANGE UP (ref 0–0.5)
EOSINOPHIL NFR BLD AUTO: 0 % — SIGNIFICANT CHANGE UP (ref 0–6)
GAS PNL BLDV: 141 MMOL/L — SIGNIFICANT CHANGE UP (ref 136–145)
GAS PNL BLDV: SIGNIFICANT CHANGE UP
GAS PNL BLDV: SIGNIFICANT CHANGE UP
GLUCOSE BLDV-MCNC: 77 MG/DL — SIGNIFICANT CHANGE UP (ref 70–99)
GLUCOSE SERPL-MCNC: 101 MG/DL — HIGH (ref 70–99)
HCO3 BLDV-SCNC: 18 MMOL/L — LOW (ref 22–29)
HCT VFR BLD CALC: 33.9 % — LOW (ref 39–50)
HCT VFR BLDA CALC: 26 % — LOW (ref 39–51)
HGB BLD CALC-MCNC: 8.5 G/DL — LOW (ref 12.6–17.4)
HGB BLD-MCNC: 10.2 G/DL — LOW (ref 13–17)
INR BLD: 1.34 RATIO — HIGH (ref 0.88–1.16)
LACTATE BLDV-MCNC: 2.1 MMOL/L — HIGH (ref 0.5–2)
LYMPHOCYTES # BLD AUTO: 1.16 K/UL — SIGNIFICANT CHANGE UP (ref 1–3.3)
LYMPHOCYTES # BLD AUTO: 9.4 % — LOW (ref 13–44)
MACROCYTES BLD QL: SLIGHT — SIGNIFICANT CHANGE UP
MAGNESIUM SERPL-MCNC: 2 MG/DL — SIGNIFICANT CHANGE UP (ref 1.6–2.6)
MANUAL SMEAR VERIFICATION: SIGNIFICANT CHANGE UP
MCHC RBC-ENTMCNC: 30.1 GM/DL — LOW (ref 32–36)
MCHC RBC-ENTMCNC: 32.9 PG — SIGNIFICANT CHANGE UP (ref 27–34)
MCV RBC AUTO: 109.4 FL — HIGH (ref 80–100)
METAMYELOCYTES # FLD: 0.9 % — HIGH (ref 0–0)
MONOCYTES # BLD AUTO: 0.74 K/UL — SIGNIFICANT CHANGE UP (ref 0–0.9)
MONOCYTES NFR BLD AUTO: 6 % — SIGNIFICANT CHANGE UP (ref 2–14)
MYELOCYTES NFR BLD: 0.9 % — HIGH (ref 0–0)
NEUTROPHILS # BLD AUTO: 10.2 K/UL — HIGH (ref 1.8–7.4)
NEUTROPHILS NFR BLD AUTO: 82 % — HIGH (ref 43–77)
NEUTS BAND # BLD: 0.8 % — SIGNIFICANT CHANGE UP (ref 0–8)
NRBC # BLD: 5 /100 — HIGH (ref 0–0)
OVALOCYTES BLD QL SMEAR: SLIGHT — SIGNIFICANT CHANGE UP
PCO2 BLDV: 41 MMHG — LOW (ref 42–55)
PH BLDV: 7.25 — LOW (ref 7.32–7.43)
PHOSPHATE SERPL-MCNC: 3.6 MG/DL — SIGNIFICANT CHANGE UP (ref 2.5–4.5)
PLAT MORPH BLD: NORMAL — SIGNIFICANT CHANGE UP
PLATELET # BLD AUTO: 192 K/UL — SIGNIFICANT CHANGE UP (ref 150–400)
PO2 BLDV: 32 MMHG — SIGNIFICANT CHANGE UP (ref 25–45)
POIKILOCYTOSIS BLD QL AUTO: SLIGHT — SIGNIFICANT CHANGE UP
POLYCHROMASIA BLD QL SMEAR: SLIGHT — SIGNIFICANT CHANGE UP
POTASSIUM BLDV-SCNC: 4 MMOL/L — SIGNIFICANT CHANGE UP (ref 3.5–5.1)
POTASSIUM SERPL-MCNC: 5.6 MMOL/L — HIGH (ref 3.5–5.3)
POTASSIUM SERPL-SCNC: 5.6 MMOL/L — HIGH (ref 3.5–5.3)
PROT SERPL-MCNC: 5.6 G/DL — LOW (ref 6–8.3)
PROTHROM AB SERPL-ACNC: 15.4 SEC — HIGH (ref 10.5–13.4)
RBC # BLD: 3.1 M/UL — LOW (ref 4.2–5.8)
RBC # FLD: 17.8 % — HIGH (ref 10.3–14.5)
RBC BLD AUTO: ABNORMAL
RH IG SCN BLD-IMP: POSITIVE — SIGNIFICANT CHANGE UP
SAO2 % BLDV: 46 % — LOW (ref 67–88)
SODIUM SERPL-SCNC: 141 MMOL/L — SIGNIFICANT CHANGE UP (ref 135–145)
WBC # BLD: 12.32 K/UL — HIGH (ref 3.8–10.5)
WBC # FLD AUTO: 12.32 K/UL — HIGH (ref 3.8–10.5)

## 2023-05-08 PROCEDURE — 73562 X-RAY EXAM OF KNEE 3: CPT | Mod: 26,50

## 2023-05-08 PROCEDURE — 86077 PHYS BLOOD BANK SERV XMATCH: CPT

## 2023-05-08 PROCEDURE — 99284 EMERGENCY DEPT VISIT MOD MDM: CPT

## 2023-05-08 RX ORDER — TACROLIMUS 5 MG/1
2 CAPSULE ORAL ONCE
Refills: 0 | Status: COMPLETED | OUTPATIENT
Start: 2023-05-08 | End: 2023-05-08

## 2023-05-08 RX ORDER — TACROLIMUS 5 MG/1
2.5 CAPSULE ORAL ONCE
Refills: 0 | Status: DISCONTINUED | OUTPATIENT
Start: 2023-05-08 | End: 2023-05-08

## 2023-05-08 RX ORDER — SODIUM CHLORIDE 9 MG/ML
1000 INJECTION INTRAMUSCULAR; INTRAVENOUS; SUBCUTANEOUS ONCE
Refills: 0 | Status: COMPLETED | OUTPATIENT
Start: 2023-05-08 | End: 2023-05-08

## 2023-05-08 RX ADMIN — SODIUM CHLORIDE 1000 MILLILITER(S): 9 INJECTION INTRAMUSCULAR; INTRAVENOUS; SUBCUTANEOUS at 22:10

## 2023-05-08 RX ADMIN — TACROLIMUS 2 MILLIGRAM(S): 5 CAPSULE ORAL at 22:29

## 2023-05-08 NOTE — ED ADULT NURSE NOTE - NSIMPLEMENTINTERV_GEN_ALL_ED
Implemented All Fall Risk Interventions:  [place] to call system. Call bell, personal items and telephone within reach. Instruct patient to call for assistance. Room bathroom lighting operational. Non-slip footwear when patient is off stretcher. Physically safe environment: no spills, clutter or unnecessary equipment. Stretcher in lowest position, wheels locked, appropriate side rails in place. Provide visual cue, wrist band, yellow gown, etc. Monitor gait and stability. Monitor for mental status changes and reorient to person, place, and time. Review medications for side effects contributing to fall risk. Reinforce activity limits and safety measures with patient and family.

## 2023-05-08 NOTE — ED PROVIDER NOTE - CLINICAL SUMMARY MEDICAL DECISION MAKING FREE TEXT BOX
73-year-old male status post heart transplant reportedly compliant with medication if he has someone fill his pillbox for him and he does not know what medications are due this evening here for generalized weakness and malaise leading to a fall.  Patient has an abrasion on his right knee which is likely traumatic however otherwise low clinical suspicion for fracture.  Will obtain x-rays.  Will obtain blood work to check for differential diagnosis including not limited to: Anemia, symptomatic anemia, electrolyte dyscrasias.  EKG shows what appears to be sinus tachycardia with normal AR, with prolonged QRS with normal QTc.  It is unchanged from prior.

## 2023-05-08 NOTE — ED ADULT TRIAGE NOTE - CHIEF COMPLAINT QUOTE
Patient got out of his bed then just knee gave up and fell Into his knees c./o knee pain, denies LOC

## 2023-05-08 NOTE — ED PROVIDER NOTE - PROGRESS NOTE DETAILS
pH 7.25 w/ Lactate 2.1 and Bicarb 19 on VBG. Hb 10.2 on CBC, this is at his baseline. Will give NSB and reassess. Tez Sutton MD pH 7.29, bicarb improved as well. Mr. Baez states he has not been drinking water at home and knew he was dehydrated. Feels better now after 1 NSB. Given cardiac hx, will defer additional fluids. K level pending, if normal plan to dc home. Mr. Baez is in agreement with this plan. Tez Sutton MD Ronit Klein, Attending Physician: repeat K WNL. pH improving. Bicarb 18. Pt informed his creatinine mildly elevated compared to prior however does not meet criteria for admission at this time. Given immunocompromised status, will discharge after risk/benefit discussion with patient. Patient feels comfortable going home.

## 2023-05-08 NOTE — ED PROVIDER NOTE - NSFOLLOWUPINSTRUCTIONS_ED_ALL_ED_FT
You were seen here for knee pain. There are no evidence of fractures or broken bones.    Take home medications as prescribed.    Follow up with your doctor in 1-2 days. Bring a copy of your report.    Stay hydrated.    Seek immediate medical care for symptoms including but not limited to: generalized weakness, frequent falling, passing out, nearly passing out or if you have any new/worsening concerns.    Read all attached.

## 2023-05-08 NOTE — ED ADULT NURSE NOTE - OBJECTIVE STATEMENT
72 y/o male with PMH of HLD, DVT, SVT, HTN, and heart transplant in 2018, presents to ED via walk-in triage c/o knee pain/injury. Pt seen in fast track area of ED. Pt states he felt weak and dizzy when he fell on his knees, endorsing b/l knee pain and SOB only upon exertion. Deneis LOC, on Abrasion noted on R knee, pt states that is old. Ecchymosis and puncture wounds from previous surgeries present on abdomen. Pt ambulates with cane but states he feels he needs a walker. Pt is A&Ox4, follows commands, breathing spontaneous and unlabored, on 72 y/o male with PMH of HLD, DVT, SVT, HTN, and heart transplant in 2018, presents to ED via walk-in triage c/o knee pain/injury. Pt seen in fast track area of ED. Pt states he felt weak and dizzy when he fell on his knees, endorsing b/l knee pain and SOB only upon exertion. Neel LOC and head strike, taking Eliquis. Abrasion noted on R knee, pt states that is old. Ecchymosis and puncture wounds from previous surgeries present on abdomen. No swelling of lower extremities noted. Pt ambulates with cane but states he feels he needs a walker. Pt is A&Ox4, follows commands, PERRL, breathing spontaneous and unlabored, on cardiac monitor, NSR, abdomen non-tender, equal strength of b/l upper and lower extremities, skin warm and dry. Pt denies cp, current SOB, abd pain, N&V, recent fevers and chills, and HA. Comfort and safety measures in place.

## 2023-05-08 NOTE — ED PROVIDER NOTE - PATIENT PORTAL LINK FT
You can access the FollowMyHealth Patient Portal offered by [facility] by registering at the following website: [email or web address]. By joining [facility]’s FollowMyHealth portal, you will also be able to view your health information using other applications (apps) compatible with our system.

## 2023-05-08 NOTE — ED PROVIDER NOTE - PHYSICAL EXAMINATION
PE:  Gen: NAD  Head: NCAT  ENT: MMM, pale conjunctiva  Chest: RRR, normal perfusion  Lungs: Symmetrical chest rise, lungs CTAB  Abdomen: soft, NTND, No rebound/guarding,   Ext: No gross deformities, R pitting edema 3+, abrasion to R knee  Neuro: awake and alert,   Skin: no rashes

## 2023-05-08 NOTE — ED PROVIDER NOTE - NS ED ROS FT
Constitutional: No fever or chills  Eyes: No visual changes  HEENT: No throat pain  CV: No chest pain  Resp: No SOB   GI: No abd pain, nausea or vomiting, no black or bloody stools  Skin: No rash  Neuro: No headache

## 2023-05-08 NOTE — PROGRESS NOTE ADULT - PROBLEM SELECTOR PLAN 3
Renal function stable, monitor  Lasix gtt @ 10cc/hr as per Heart Failure Lab Facility: 0 Renal function stable, monitor  Lasix 40 mg po bid per-CHF team

## 2023-05-08 NOTE — ED PROVIDER NOTE - OBJECTIVE STATEMENT
Ronit Klein, Attending Physician: 73M 71 y/o male with hx of nonischemic cardiomyopathy s/p HM2 LVAD, underwent OHT 2/23/18 with hepatitis C donor, hx of hemolytic anemia here for bilateral knee pain. Pt was feeling weak today leading to his fall onto knees.

## 2023-05-09 ENCOUNTER — RESULT REVIEW (OUTPATIENT)
Age: 73
End: 2023-05-09

## 2023-05-09 ENCOUNTER — APPOINTMENT (OUTPATIENT)
Dept: HEMATOLOGY ONCOLOGY | Facility: CLINIC | Age: 73
End: 2023-05-09
Payer: MEDICARE

## 2023-05-09 VITALS
TEMPERATURE: 96.6 F | SYSTOLIC BLOOD PRESSURE: 122 MMHG | OXYGEN SATURATION: 98 % | WEIGHT: 173.99 LBS | HEIGHT: 66 IN | RESPIRATION RATE: 16 BRPM | DIASTOLIC BLOOD PRESSURE: 81 MMHG | BODY MASS INDEX: 27.96 KG/M2 | HEART RATE: 102 BPM

## 2023-05-09 VITALS
RESPIRATION RATE: 18 BRPM | OXYGEN SATURATION: 95 % | DIASTOLIC BLOOD PRESSURE: 86 MMHG | SYSTOLIC BLOOD PRESSURE: 125 MMHG | TEMPERATURE: 98 F | HEART RATE: 91 BPM

## 2023-05-09 LAB
ALBUMIN SERPL ELPH-MCNC: 3.7 G/DL
ALP BLD-CCNC: 49 U/L
ALT SERPL-CCNC: 34 U/L
ANION GAP SERPL CALC-SCNC: 9 MMOL/L
ANION GAP SERPL CALC-SCNC: 9 MMOL/L — SIGNIFICANT CHANGE UP (ref 5–17)
ANISOCYTOSIS BLD QL: SLIGHT — SIGNIFICANT CHANGE UP
AST SERPL-CCNC: 28 U/L
BASE EXCESS BLDV CALC-SCNC: -4.8 MMOL/L — LOW (ref -2–3)
BASOPHILS # BLD AUTO: 0 K/UL — SIGNIFICANT CHANGE UP (ref 0–0.2)
BASOPHILS NFR BLD AUTO: 0 % — SIGNIFICANT CHANGE UP (ref 0–2)
BILIRUB SERPL-MCNC: 0.7 MG/DL
BUN SERPL-MCNC: 44 MG/DL
BUN SERPL-MCNC: 48 MG/DL — HIGH (ref 7–23)
CA-I SERPL-SCNC: 1.25 MMOL/L — SIGNIFICANT CHANGE UP (ref 1.15–1.33)
CALCIUM SERPL-MCNC: 8.1 MG/DL — LOW (ref 8.4–10.5)
CALCIUM SERPL-MCNC: 9.1 MG/DL
CHLORIDE BLDV-SCNC: 110 MMOL/L — HIGH (ref 96–108)
CHLORIDE SERPL-SCNC: 111 MMOL/L
CHLORIDE SERPL-SCNC: 114 MMOL/L — HIGH (ref 96–108)
CO2 BLDV-SCNC: 23 MMOL/L — SIGNIFICANT CHANGE UP (ref 22–26)
CO2 SERPL-SCNC: 18 MMOL/L — LOW (ref 22–31)
CO2 SERPL-SCNC: 22 MMOL/L
CREAT SERPL-MCNC: 1.86 MG/DL — HIGH (ref 0.5–1.3)
CREAT SERPL-MCNC: 1.88 MG/DL
EGFR: 37 ML/MIN/1.73M2
EGFR: 38 ML/MIN/1.73M2 — LOW
EOSINOPHIL # BLD AUTO: 0 K/UL — SIGNIFICANT CHANGE UP (ref 0–0.5)
EOSINOPHIL NFR BLD AUTO: 0 % — SIGNIFICANT CHANGE UP (ref 0–6)
GAS PNL BLDV: 137 MMOL/L — SIGNIFICANT CHANGE UP (ref 136–145)
GAS PNL BLDV: SIGNIFICANT CHANGE UP
GAS PNL BLDV: SIGNIFICANT CHANGE UP
GLUCOSE BLDV-MCNC: 89 MG/DL — SIGNIFICANT CHANGE UP (ref 70–99)
GLUCOSE SERPL-MCNC: 68 MG/DL
GLUCOSE SERPL-MCNC: 94 MG/DL — SIGNIFICANT CHANGE UP (ref 70–99)
HCO3 BLDV-SCNC: 22 MMOL/L — SIGNIFICANT CHANGE UP (ref 22–29)
HCT VFR BLD CALC: 32.8 % — LOW (ref 39–50)
HCT VFR BLDA CALC: 27 % — LOW (ref 39–51)
HGB BLD CALC-MCNC: 9 G/DL — LOW (ref 12.6–17.4)
HGB BLD-MCNC: 9.9 G/DL — LOW (ref 13–17)
LACTATE BLDV-MCNC: 1.3 MMOL/L — SIGNIFICANT CHANGE UP (ref 0.5–2)
LYMPHOCYTES # BLD AUTO: 1.56 K/UL — SIGNIFICANT CHANGE UP (ref 1–3.3)
LYMPHOCYTES # BLD AUTO: 12 % — LOW (ref 13–44)
MCHC RBC-ENTMCNC: 30.2 G/DL — LOW (ref 32–36)
MCHC RBC-ENTMCNC: 32.9 PG — SIGNIFICANT CHANGE UP (ref 27–34)
MCV RBC AUTO: 109 FL — HIGH (ref 80–100)
METAMYELOCYTES # FLD: 1 % — HIGH (ref 0–0)
MONOCYTES # BLD AUTO: 1.04 K/UL — HIGH (ref 0–0.9)
MONOCYTES NFR BLD AUTO: 8 % — SIGNIFICANT CHANGE UP (ref 2–14)
MYELOCYTES NFR BLD: 1 % — HIGH (ref 0–0)
NEUTROPHILS # BLD AUTO: 10.13 K/UL — HIGH (ref 1.8–7.4)
NEUTROPHILS NFR BLD AUTO: 78 % — HIGH (ref 43–77)
NRBC # BLD: 2 /100 — HIGH (ref 0–0)
NRBC # BLD: SIGNIFICANT CHANGE UP /100 WBCS (ref 0–0)
PCO2 BLDV: 45 MMHG — SIGNIFICANT CHANGE UP (ref 42–55)
PH BLDV: 7.29 — LOW (ref 7.32–7.43)
PLAT MORPH BLD: NORMAL — SIGNIFICANT CHANGE UP
PLATELET # BLD AUTO: 175 K/UL — SIGNIFICANT CHANGE UP (ref 150–400)
PO2 BLDV: 44 MMHG — SIGNIFICANT CHANGE UP (ref 25–45)
POIKILOCYTOSIS BLD QL AUTO: SLIGHT — SIGNIFICANT CHANGE UP
POLYCHROMASIA BLD QL SMEAR: SLIGHT — SIGNIFICANT CHANGE UP
POTASSIUM BLDV-SCNC: 4.5 MMOL/L — SIGNIFICANT CHANGE UP (ref 3.5–5.1)
POTASSIUM SERPL-MCNC: 5.2 MMOL/L — SIGNIFICANT CHANGE UP (ref 3.5–5.3)
POTASSIUM SERPL-SCNC: 4.5 MMOL/L
POTASSIUM SERPL-SCNC: 5.2 MMOL/L — SIGNIFICANT CHANGE UP (ref 3.5–5.3)
PROT SERPL-MCNC: 5.3 G/DL
RBC # BLD: 3.01 M/UL — LOW (ref 4.2–5.8)
RBC # FLD: 17.7 % — HIGH (ref 10.3–14.5)
RBC BLD AUTO: ABNORMAL
RETICS #: 124.5 K/UL — SIGNIFICANT CHANGE UP (ref 25–125)
RETICS/RBC NFR: 4 % — HIGH (ref 0.5–2.5)
SAO2 % BLDV: 72.1 % — SIGNIFICANT CHANGE UP (ref 67–88)
SCHISTOCYTES BLD QL AUTO: SLIGHT — SIGNIFICANT CHANGE UP
SODIUM SERPL-SCNC: 141 MMOL/L — SIGNIFICANT CHANGE UP (ref 135–145)
SODIUM SERPL-SCNC: 143 MMOL/L
STOMATOCYTES BLD QL SMEAR: SLIGHT — SIGNIFICANT CHANGE UP
WBC # BLD: 12.99 K/UL — HIGH (ref 3.8–10.5)
WBC # FLD AUTO: 12.99 K/UL — HIGH (ref 3.8–10.5)

## 2023-05-09 PROCEDURE — 85018 HEMOGLOBIN: CPT

## 2023-05-09 PROCEDURE — 80048 BASIC METABOLIC PNL TOTAL CA: CPT

## 2023-05-09 PROCEDURE — 82803 BLOOD GASES ANY COMBINATION: CPT

## 2023-05-09 PROCEDURE — 82330 ASSAY OF CALCIUM: CPT

## 2023-05-09 PROCEDURE — 86901 BLOOD TYPING SEROLOGIC RH(D): CPT

## 2023-05-09 PROCEDURE — 82435 ASSAY OF BLOOD CHLORIDE: CPT

## 2023-05-09 PROCEDURE — 86900 BLOOD TYPING SEROLOGIC ABO: CPT

## 2023-05-09 PROCEDURE — 84484 ASSAY OF TROPONIN QUANT: CPT

## 2023-05-09 PROCEDURE — 86850 RBC ANTIBODY SCREEN: CPT

## 2023-05-09 PROCEDURE — 85610 PROTHROMBIN TIME: CPT

## 2023-05-09 PROCEDURE — 84100 ASSAY OF PHOSPHORUS: CPT

## 2023-05-09 PROCEDURE — 85730 THROMBOPLASTIN TIME PARTIAL: CPT

## 2023-05-09 PROCEDURE — 99285 EMERGENCY DEPT VISIT HI MDM: CPT | Mod: 25

## 2023-05-09 PROCEDURE — 84132 ASSAY OF SERUM POTASSIUM: CPT

## 2023-05-09 PROCEDURE — 82962 GLUCOSE BLOOD TEST: CPT

## 2023-05-09 PROCEDURE — 99213 OFFICE O/P EST LOW 20 MIN: CPT

## 2023-05-09 PROCEDURE — 73562 X-RAY EXAM OF KNEE 3: CPT

## 2023-05-09 PROCEDURE — 93005 ELECTROCARDIOGRAM TRACING: CPT

## 2023-05-09 PROCEDURE — 83880 ASSAY OF NATRIURETIC PEPTIDE: CPT

## 2023-05-09 PROCEDURE — 85014 HEMATOCRIT: CPT

## 2023-05-09 PROCEDURE — 83605 ASSAY OF LACTIC ACID: CPT

## 2023-05-09 PROCEDURE — 36415 COLL VENOUS BLD VENIPUNCTURE: CPT

## 2023-05-09 PROCEDURE — 86880 COOMBS TEST DIRECT: CPT

## 2023-05-09 PROCEDURE — 86860 RBC ANTIBODY ELUTION: CPT

## 2023-05-09 PROCEDURE — 84295 ASSAY OF SERUM SODIUM: CPT

## 2023-05-09 PROCEDURE — 83735 ASSAY OF MAGNESIUM: CPT

## 2023-05-09 PROCEDURE — 82947 ASSAY GLUCOSE BLOOD QUANT: CPT

## 2023-05-09 PROCEDURE — 80053 COMPREHEN METABOLIC PANEL: CPT

## 2023-05-09 PROCEDURE — 85025 COMPLETE CBC W/AUTO DIFF WBC: CPT

## 2023-05-10 ENCOUNTER — APPOINTMENT (OUTPATIENT)
Dept: HEMATOLOGY ONCOLOGY | Facility: CLINIC | Age: 73
End: 2023-05-10

## 2023-05-10 NOTE — ED POST DISCHARGE NOTE - RESULT SUMMARY
Jacky positive, panagglutinin and on antibody eluate.  Patient with history of Grayson syndrome.  Would advise patient to follow-up with hematologist..

## 2023-05-11 ENCOUNTER — NON-APPOINTMENT (OUTPATIENT)
Age: 73
End: 2023-05-11

## 2023-05-15 ENCOUNTER — NON-APPOINTMENT (OUTPATIENT)
Age: 73
End: 2023-05-15

## 2023-05-16 ENCOUNTER — APPOINTMENT (OUTPATIENT)
Dept: HEMATOLOGY ONCOLOGY | Facility: CLINIC | Age: 73
End: 2023-05-16
Payer: MEDICARE

## 2023-05-16 ENCOUNTER — RESULT REVIEW (OUTPATIENT)
Age: 73
End: 2023-05-16

## 2023-05-16 VITALS
TEMPERATURE: 98.1 F | BODY MASS INDEX: 28.57 KG/M2 | OXYGEN SATURATION: 99 % | HEART RATE: 96 BPM | RESPIRATION RATE: 16 BRPM | WEIGHT: 177 LBS | DIASTOLIC BLOOD PRESSURE: 85 MMHG | SYSTOLIC BLOOD PRESSURE: 122 MMHG

## 2023-05-16 LAB
ALBUMIN SERPL ELPH-MCNC: 3.7 G/DL
ALP BLD-CCNC: 52 U/L
ALT SERPL-CCNC: 24 U/L
ANION GAP SERPL CALC-SCNC: 13 MMOL/L
ANISOCYTOSIS BLD QL: SLIGHT — SIGNIFICANT CHANGE UP
AST SERPL-CCNC: 21 U/L
BASOPHILS # BLD AUTO: 0 K/UL — SIGNIFICANT CHANGE UP (ref 0–0.2)
BASOPHILS NFR BLD AUTO: 0 % — SIGNIFICANT CHANGE UP (ref 0–2)
BILIRUB SERPL-MCNC: 0.7 MG/DL
BUN SERPL-MCNC: 34 MG/DL
CALCIUM SERPL-MCNC: 8.4 MG/DL
CHLORIDE SERPL-SCNC: 106 MMOL/L
CO2 SERPL-SCNC: 20 MMOL/L
CREAT SERPL-MCNC: 1.46 MG/DL
EGFR: 50 ML/MIN/1.73M2
EOSINOPHIL # BLD AUTO: 0 K/UL — SIGNIFICANT CHANGE UP (ref 0–0.5)
EOSINOPHIL NFR BLD AUTO: 0 % — SIGNIFICANT CHANGE UP (ref 0–6)
GLUCOSE SERPL-MCNC: 207 MG/DL
HCT VFR BLD CALC: 32.8 % — LOW (ref 39–50)
HGB BLD-MCNC: 10 G/DL — LOW (ref 13–17)
LDH SERPL-CCNC: 680 U/L
LYMPHOCYTES # BLD AUTO: 0.94 K/UL — LOW (ref 1–3.3)
LYMPHOCYTES # BLD AUTO: 8 % — LOW (ref 13–44)
MCHC RBC-ENTMCNC: 30.5 G/DL — LOW (ref 32–36)
MCHC RBC-ENTMCNC: 32.9 PG — SIGNIFICANT CHANGE UP (ref 27–34)
MCV RBC AUTO: 107.9 FL — HIGH (ref 80–100)
METAMYELOCYTES # FLD: 1 % — HIGH (ref 0–0)
MONOCYTES # BLD AUTO: 0.71 K/UL — SIGNIFICANT CHANGE UP (ref 0–0.9)
MONOCYTES NFR BLD AUTO: 6 % — SIGNIFICANT CHANGE UP (ref 2–14)
NEUTROPHILS # BLD AUTO: 9.99 K/UL — HIGH (ref 1.8–7.4)
NEUTROPHILS NFR BLD AUTO: 85 % — HIGH (ref 43–77)
NRBC # BLD: 2 /100 — HIGH (ref 0–0)
NRBC # BLD: SIGNIFICANT CHANGE UP /100 WBCS (ref 0–0)
PLAT MORPH BLD: NORMAL — SIGNIFICANT CHANGE UP
PLATELET # BLD AUTO: 187 K/UL — SIGNIFICANT CHANGE UP (ref 150–400)
POIKILOCYTOSIS BLD QL AUTO: SLIGHT — SIGNIFICANT CHANGE UP
POLYCHROMASIA BLD QL SMEAR: SLIGHT — SIGNIFICANT CHANGE UP
POTASSIUM SERPL-SCNC: 5 MMOL/L
PROT SERPL-MCNC: 5.5 G/DL
RBC # BLD: 3.04 M/UL — LOW (ref 4.2–5.8)
RBC # FLD: 17.8 % — HIGH (ref 10.3–14.5)
RBC BLD AUTO: ABNORMAL
SCHISTOCYTES BLD QL AUTO: SLIGHT — SIGNIFICANT CHANGE UP
SODIUM SERPL-SCNC: 139 MMOL/L
STOMATOCYTES BLD QL SMEAR: SLIGHT — SIGNIFICANT CHANGE UP
WBC # BLD: 11.75 K/UL — HIGH (ref 3.8–10.5)
WBC # FLD AUTO: 11.75 K/UL — HIGH (ref 3.8–10.5)

## 2023-05-16 PROCEDURE — 99213 OFFICE O/P EST LOW 20 MIN: CPT

## 2023-05-16 NOTE — CONSULT LETTER
[Dear  ___] : Dear  [unfilled], [Courtesy Letter:] : I had the pleasure of seeing your patient, [unfilled], in my office today. [Please see my note below.] : Please see my note below. [Sincerely,] : Sincerely, [DrEduar  ___] : Dr. HAN [FreeTextEntry2] : Dr. Lisa Ac [FreeTextEntry3] : Tao Rosario M.D., FACP\par Professor of Medicine\par [employer]\par Associate Chief, Division of Hematology\par [facility]\par [facility]\par [address] [place] [address]\[place] [address]\par [phone]\par \par \par \par

## 2023-05-16 NOTE — ASSESSMENT
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 Was in remission with well compensated hemolytic anemia on low dose prednisone, but relapsed after tapered to 3 mg daily. 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  Was hospitalized with aflutter, now taking Diltiazem. Also found to have metapneumovirus but no intervention was needed.  Receiving insulin coverage for elevated sugar levels. Hgb has dropped from 12.5 to 10.2 to 10.7 to  9.9.  Counts have dropped with taper of steroids.  Care discussed with Dr Rosario   Will consider Rituxan, will contact cardiac transplant team for approval. \par \par Plan:\par Continue Prednisone 40 mg daily \par PCP prophylaxis as per Cardiology- is taking Bactrim\par CMP, LDH\par Tacrolimus/ ASA per Cardiology.\par Folic acid\par To ER prn \par RTC in 2 weeks\par

## 2023-05-16 NOTE — HISTORY OF PRESENT ILLNESS
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 Treatment - prednisone/Rituxan x 1\par 8/2022 -- relapse with thrombocytopenia as well  (Grayson's syndrome)\par 2/2018 Cardiac transplant\par Bilateral subclavian thrombosis\par UGI bleed [de-identified] : Feeling some fatigue.  Taking Prednisone 40 mg daily.  Went to ED yesterday due to weakness and had fallen and hit knees. No contusions or bruising noted on knees.  Denies LOC.  Was found to be dehydrated and had low bicarb level, received fluids and 1 amp of Na bicarb. Feeling slightly better today.  Hospital advised pt to keep himself well hydrated. Denies fevers, chills, night sweats, palpitations, dyspnea, pain, bleeding.  Has been taking insulin daily and using coverage as warranted.     \par \par \par

## 2023-05-17 LAB
HBV CORE IGG+IGM SER QL: NONREACTIVE
HBV CORE IGM SER QL: NONREACTIVE
HBV SURFACE AB SER QL: NONREACTIVE
HBV SURFACE AG SER QL: NONREACTIVE
HCV AB SER QL: NONREACTIVE
HCV S/CO RATIO: 0.06 S/CO

## 2023-05-17 RX ORDER — PREDNISONE 20 MG/1
20 TABLET ORAL
Qty: 60 | Refills: 5 | Status: DISCONTINUED | COMMUNITY
Start: 2023-04-07 | End: 2023-05-17

## 2023-05-18 ENCOUNTER — APPOINTMENT (OUTPATIENT)
Dept: ENDOCRINOLOGY | Facility: CLINIC | Age: 73
End: 2023-05-18
Payer: MEDICARE

## 2023-05-18 VITALS
SYSTOLIC BLOOD PRESSURE: 140 MMHG | RESPIRATION RATE: 18 BRPM | BODY MASS INDEX: 28.93 KG/M2 | HEART RATE: 79 BPM | DIASTOLIC BLOOD PRESSURE: 82 MMHG | WEIGHT: 180 LBS | HEIGHT: 66 IN | OXYGEN SATURATION: 97 %

## 2023-05-18 LAB — HBA1C MFR BLD HPLC: 7.3

## 2023-05-18 PROCEDURE — 99214 OFFICE O/P EST MOD 30 MIN: CPT

## 2023-05-18 PROCEDURE — 83036 HEMOGLOBIN GLYCOSYLATED A1C: CPT | Mod: QW

## 2023-05-18 NOTE — DISCUSSION/SUMMARY
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 \par hemolytic anemia/recent thrombocytopenia. Admission in feb for recurrent hem anemia. d/c on pred 75mg. Follow by Dr Rosario. \par intolerant of cellcept due leukopenia. 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 everolimus d/c for that reason. \par Annual Feb 2023: no obstructive CAD. 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 \par Last Bx Feb 2022 OR.  C4D 50%. +1 staining.No histopath features of AMR. No Hx  DSAs. \par last allosure Jan 2023.  < .12. \par Baseline renal function prior to most recent admission 1.4. \par Followed by Dr Clemens's for osteoperosis. Initiated on prolia Q 6mths.   \par Seen by rheumatology one year ago for tophi/gout\par Admitted: March 3-18. Chest tightness. Aflutter. CT scan pneumonia. Viral panel - human metapneumovirus. Zocyn, cefipime. New onset hyperglycemia. Initiated on insulin. Seen by hem and pred reduced to 60mg. bilat LE DVTs on eliquis. \par \par Plan:\par #s/p OHT\par goal tacro level 4-6, f/u today's level on current dose 2mg bid\par remains off cellcept\par prednisone dose per hematology\par check heartcare and DSA next visit\par TTE q 6 months (next due in august)\par \par #NORMAN\par Needs f/u with cardiorenal group \par \par #hyperglycemia related to medications\par - Needs endocrine f/u\par \par #hemolytic anemia\par - f/u Dr Rosario for hemolytic anemia and possibly slow wean of prednisone\par \par #psychosocial\par - asked  to contact pts proxy Tahira to discuss if pt qualifies for more support at home and other transportation means (pt contacts NP everyday for assistance with appts/transportation)\par \par RTC 2 months with Dr Campbell\par 35 mins spent with patient and chart \par Findings, assessment and plan reviewed with Dr Campbell\par \par \par

## 2023-05-18 NOTE — CARDIOLOGY SUMMARY
[de-identified] : DSE 12/22/2020: Conclusions:\par 1. Normal hemodynamic response.\par 2. Normal electrocardiographic response.\par 3. Overall preserved left ventricular systolic function\par with mild hypokinesis of the mid to distal anterior wall at\par baseline. Normal augmentation in left ventricular systolic\par function with dobutamine infusion.\par 4. No evidence of inducible ischemia on stress\par echocardiogram images.\par *** Compared with echocardiogram of 12/2/2020, overall\par preserved left ventricular systolic function at baseline\par with mild hypokiesis of the mid to distal anterior wall.\par Normal augmentation in left ventricular systolic function\par with dobutamine infusion.\par \par 2/20/20: DSE EF 50-55% no evidence of inducible ischemia on pharmacologic stress echo images [de-identified] : \par TTE 3/3/23: LVIDd 3.6, EF 50-55%, concentric remodeling \par TTE 7/25/22: EF 60%, LVIDD 4.5cm, normal LV function, normal RV size with decreased RV function, mild TR, no pericardial effusion\par \par TTE 2/28/22: EF 65% LVIDD not calculated. normal biV function. 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 minimal TR. no pericardial effusion. [de-identified] : \par CT chest/abd/pelvis 3/14/23: IMPRESSION:\par Chest: Improving aeration of previously impacted right lower lobe distal  airways with improving bibasilar atelectasis. Additionally, there is a \par small clustered area of unchanged nodular opacities which may represent \par superimposed infection or inflammation. 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  No additional coronary-cameral fistulae noted. 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 IVUS performed of the RCA\par which was normal. Normal RHC\par \par 12/2/2020: RHC/Biopsy ISHLT Grade 0R\par 11/18/2020:  L/RHC w/ IVUS:\par CORONARY VESSELS: The coronary circulation is right dominant.\par LM:   --  LM: The vessel was very large sized. Angiography showed\par aneurysmal dilatation.\par LAD:   --  Proximal LAD: The vessel was very large sized. Angiography\par showed aneurysmal dilatation.\par --  Mid LAD: The vessel was very large sized and excessively ectatic. A\par fistula to the left ventricle was identified.\par --  Distal LAD: Normal. 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 No significant intimal thickening/hyperplasia or\par plaque was observed. [de-identified] : 3/15/23: b/l LE duplex\par IMPRESSION: There is acute below the knee DVT affecting the right soleal and the left soleal and gastrocnemius veins.

## 2023-05-18 NOTE — PHYSICAL EXAM
[Well Developed] : well developed [Well Nourished] : well nourished [Normal] : normal S1, S2, no murmur, no rub, no gallop [Normal S1, S2] : normal S1, S2 [de-identified] : no jvd [de-identified] : +III/VI diastolic murmur, tachycardic

## 2023-05-18 NOTE — HISTORY OF PRESENT ILLNESS
[FreeTextEntry1] : Mr. Baez is a 71 year old man with past medical history of a nonischemic cardiomyopathy and chronic systolic heart failure s/p HM2 LVAD in June 2017 complicated by recurrent GI hemorrhage and possible pump thrombosis who underwent heart transplant on 2/23/18 with a hepatitis C positive donor. His course was complicated by bilateral IJ/subclavian thrombi, a persistent small right sided pleural effusion, as well as acute on chronic renal failure of unclear etiology. In addition, his post-operative course was complicated by graft dysfunction of unclear etiology and persistent C4d positive staining on endomyocardial biopsy with negative DSAs. He developed joshua evidence of graft dysfunction leading to treatment with plasmapheresis, IVIG, and rituximab. Subsequently in June of 2018 he was found to have an pneumonia, that was ultimately diagnosed as Nocardia. This was successfully treated with therapy completed in August 2019.\par \par In the spring 2020, he was admitted with hemolytic anemia of unclear etiology. There were no clear precipitating factors, such as infection. He was treated with prednisone and Rituximab. Given the potential for CNI inhibitors leading to hemolytic anemia, we transitioned him to everolimus and he remained on high dose prednisone. He was subsequently admitted with acute anemia (not hemolytic). He developed severe leukopenia and Klebsiella bacteremia. Following treatment of this, he developed a severe C. diff colitis infection leading to a prolonged recovery. He was weaned to lower dose prednisone and the everolimus was transitioned back to tacrolimus. At the time of discharge he was found to have low levels of CMV viremia and was started back on valganciclovir. 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 Discharged home and had been following closely with heme/onc.\par \par Pt readmitted 3/3-3/18/23 who presented to [facility] ER on with new onset of chest tightness, found to be in Aflutter/fib. Hospital course c/b by URI w/ human metapneumovirus, normal LV function, acute on chronic kidney injury likely 2/2 hypovolemia, worsening leukocytosis with CT chest c/f PNA and hyperglycemia. Heme/onc consulted for hemolytic anemia management and prednisone dosing reduced to 60mg daily. Endocrine consulted for new onset of hyperglycemia and insulin teaching.  Bacterial infection-work up negative to date however had an elevated Fungitell.  Treated with broad spectrum abx from 3/3 to 3/8 (zosyn then cefepime).  Pt was cleared for discharge home on 3/18.\par \par Pt here today for follow up clinic visit. Overall pt reports doing ok and he continues to be able to walk 2 blocks with his cane. His right middle finger is swollen and has minor drainage, currently wrapped with gauze. He missed rheumatology appt last week. Home VS log: -140/88-93, am FS = 110-220mg/dL. He is not checking fingersticks QID. He is only taking lantus insulin. He is not using nystatin qid, only once a day. Admits to missing veltessa sometimes.  Has a HHA ~25 hours/week and visiting RN TIW. Denied SOB, CP, orthopnea, cough, or swelling in the legs.  He has no fevers, chills, sweats, dysuria, hematuria, or headaches.  Appetite good, and sleeping well.  Attends senior center most days M-F 8am-5pm.\par \par Health maintenance:\par DEXA 5/2019, 6/2021: osteopenia, 6/2022: worsening osteopenia. Followed by Dr. Clemens.\par CT Colon (4/2017) IMPRESSION: No colonic polyp or mass.\par PSA 6.76 ( 4/29/2022)\par HgA1c 4.7% (2/17/23)

## 2023-05-19 ENCOUNTER — OUTPATIENT (OUTPATIENT)
Dept: OUTPATIENT SERVICES | Facility: HOSPITAL | Age: 73
LOS: 1 days | End: 2023-05-19
Payer: MEDICARE

## 2023-05-19 ENCOUNTER — APPOINTMENT (OUTPATIENT)
Dept: HEART FAILURE | Facility: CLINIC | Age: 73
End: 2023-05-19
Payer: MEDICARE

## 2023-05-19 VITALS
HEART RATE: 106 BPM | BODY MASS INDEX: 30.05 KG/M2 | OXYGEN SATURATION: 96 % | TEMPERATURE: 97.4 F | WEIGHT: 187 LBS | DIASTOLIC BLOOD PRESSURE: 89 MMHG | SYSTOLIC BLOOD PRESSURE: 135 MMHG | HEIGHT: 66 IN

## 2023-05-19 DIAGNOSIS — R60.9 EDEMA, UNSPECIFIED: ICD-10-CM

## 2023-05-19 DIAGNOSIS — Z94.1 HEART TRANSPLANT STATUS: Chronic | ICD-10-CM

## 2023-05-19 DIAGNOSIS — Z94.1 HEART TRANSPLANT STATUS: ICD-10-CM

## 2023-05-19 DIAGNOSIS — T50.905A HYPERGLYCEMIA, UNSPECIFIED: ICD-10-CM

## 2023-05-19 DIAGNOSIS — R73.9 HYPERGLYCEMIA, UNSPECIFIED: ICD-10-CM

## 2023-05-19 PROCEDURE — 99214 OFFICE O/P EST MOD 30 MIN: CPT

## 2023-05-19 PROCEDURE — 93356 MYOCRD STRAIN IMG SPCKL TRCK: CPT

## 2023-05-19 PROCEDURE — 93321 DOPPLER ECHO F-UP/LMTD STD: CPT | Mod: 26

## 2023-05-19 PROCEDURE — 93308 TTE F-UP OR LMTD: CPT

## 2023-05-19 PROCEDURE — 93321 DOPPLER ECHO F-UP/LMTD STD: CPT

## 2023-05-19 PROCEDURE — 93308 TTE F-UP OR LMTD: CPT | Mod: 26

## 2023-05-19 NOTE — PHYSICAL EXAM
[Restricted in physically strenuous activity but ambulatory and able to carry out work of a light or sedentary nature] : Status 1- Restricted in physically strenuous activity but ambulatory and able to carry out work of a light or sedentary nature, e.g., light house work, office work [Normal] : affect appropriate [de-identified] : RRR. S1S2 normal. Gr 2/6 holosystolic and holodiastolic murmur LLSB. No gallops.

## 2023-05-19 NOTE — REVIEW OF SYSTEMS
[Difficulty Walking] : difficulty walking [Decreased Appetite] : appetite not decreased [Eye Pain] : no pain [Blurred Vision] : no blurred vision [Dysphagia] : no dysphagia [Neck Pain] : no neck pain [Dysphonia] : no dysphonia [Chest Pain] : no chest pain [Palpitations] : no palpitations [Shortness Of Breath] : no shortness of breath [Nausea] : no nausea [Constipation] : no constipation [Abdominal Pain] : no abdominal pain [Vomiting] : no vomiting [Diarrhea] : no diarrhea [Polyuria] : no polyuria [Pain/Numbness of Digits] : no pain/numbness of digits [Polydipsia] : no polydipsia

## 2023-05-19 NOTE — DISCUSSION/SUMMARY
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 \par hemolytic anemia/recent thrombocytopenia. Admission in feb for recurrent hem anemia. d/c on pred 75mg. Follow by Dr Rosario. \par intolerant of cellcept due leukopenia. 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 everolimus d/c for that reason. \par Annual Feb 2023: no obstructive CAD. 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 \par Last Bx Feb 2022 OR.  C4D 50%. +1 staining. No histopath features of AMR. No Hx  DSAs. \par last allosure Jan 2023.  < .12. \par Baseline renal function prior to most recent admission 1.4. \par Followed by Dr Clemens's for osteoperosis. Initiated on prolia Q 6mths.   \par Seen by rheumatology one year ago for tophi/gout\par Admitted: March 3-18. Chest tightness. Aflutter. CT scan pneumonia. Viral panel - human metapneumovirus. Zocyn, cefipime. New onset hyperglycemia. Initiated on insulin. Seen by hem and pred reduced to 60mg. bilat LE DVTs on eliquis. \par \par Plan:\par #s/p OHT\par goal tacro level 4-6, continue current dose 2mg bid\par remains off cellcept\par prednisone dose per hematology \par heartcare normal 5/17, DSA pending today\par f/u TTE today\par \par #volume overload\par - start lasix 40mg po daily\par - check daily weights\par \par #NORMAN\par - Needs f/u with cardiorenal group \par \par #hyperglycemia related to medications\par - endocrine f/u\par \par #hemolytic anemia\par - f/u Dr Rosario for hemolytic anemia and possibly slow wean of prednisone\par - defer rituximab infusions at this time til volume overload is improved\par \par #psychosocial\par - asked  to contact pts proxy Tahira to discuss if pt qualifies for more support at home and other transportation means (pt contacts NP everyday for assistance with appts/transportation)\par \par RTC next week dago Campbell\par 35 mins spent with patient and chart \par Findings, assessment and plan reviewed with Dr Campbell\par \par \par

## 2023-05-19 NOTE — CONSULT LETTER
[Dear  ___] : Dear  [unfilled], [Courtesy Letter:] : I had the pleasure of seeing your patient, [unfilled], in my office today. [Please see my note below.] : Please see my note below. [Sincerely,] : Sincerely, [DrEduar  ___] : Dr. HAN [FreeTextEntry2] : Dr. Lisa Ac [FreeTextEntry3] : Tao Rosario M.D., FACP\par Professor of Medicine\par [employer]\par Associate Chief, Division of Hematology\par [facility]\par [facility]\par [address] [place] [address]\[place] [address]\par [phone]\par \par \par \par

## 2023-05-19 NOTE — CARDIOLOGY SUMMARY
[de-identified] : \par EKG 4/3/23: NSR 91bpm +RBBB\par EKG 12/28/22: NSR 95 bpm +RBBB [de-identified] : DSE 12/22/2020: Conclusions:\par 1. Normal hemodynamic response.\par 2. Normal electrocardiographic response.\par 3. Overall preserved left ventricular systolic function\par with mild hypokinesis of the mid to distal anterior wall at\par baseline. Normal augmentation in left ventricular systolic\par function with dobutamine infusion.\par 4. No evidence of inducible ischemia on stress\par echocardiogram images.\par *** Compared with echocardiogram of 12/2/2020, overall\par preserved left ventricular systolic function at baseline\par with mild hypokiesis of the mid to distal anterior wall.\par Normal augmentation in left ventricular systolic function\par with dobutamine infusion.\par \par 2/20/20: DSE EF 50-55% no evidence of inducible ischemia on pharmacologic stress echo images [de-identified] : \par TTE 3/3/23: LVIDd 3.6, EF 50-55%, concentric remodeling \par TTE 7/25/22: EF 60%, LVIDD 4.5cm, normal LV function, normal RV size with decreased RV function, mild TR, no pericardial effusion\par \par TTE 2/28/22: EF 65% LVIDD not calculated. normal biV function. 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 minimal TR. no pericardial effusion. [de-identified] : \par CT chest/abd/pelvis 3/14/23: IMPRESSION:\par Chest: Improving aeration of previously impacted right lower lobe distal  airways with improving bibasilar atelectasis. Additionally, there is a \par small clustered area of unchanged nodular opacities which may represent \par superimposed infection or inflammation. 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  No additional coronary-cameral fistulae noted. 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 IVUS performed of the RCA\par which was normal. Normal RHC\par \par 12/2/2020: RHC/Biopsy ISHLT Grade 0R\par 11/18/2020:  L/RHC w/ IVUS:\par CORONARY VESSELS: The coronary circulation is right dominant.\par LM:   --  LM: The vessel was very large sized. Angiography showed\par aneurysmal dilatation.\par LAD:   --  Proximal LAD: The vessel was very large sized. Angiography\par showed aneurysmal dilatation.\par --  Mid LAD: The vessel was very large sized and excessively ectatic. A\par fistula to the left ventricle was identified.\par --  Distal LAD: Normal. 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 No significant intimal thickening/hyperplasia or\par plaque was observed. [de-identified] : 3/15/23: b/l LE duplex\par IMPRESSION: There is acute below the knee DVT affecting the right soleal and the left soleal and gastrocnemius veins.

## 2023-05-19 NOTE — REVIEW OF SYSTEMS
[Negative] : Allergic/Immunologic [Lower Ext Edema] : lower extremity edema [Shortness Of Breath] : shortness of breath [FreeTextEntry5] : BLE swelling, BL upper extremity edema

## 2023-05-19 NOTE — HISTORY OF PRESENT ILLNESS
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 Treatment - prednisone/Rituxan x 1\par 8/2022 -- relapse with thrombocytopenia as well  (Grayson's syndrome)\par 2/2018 Cardiac transplant\par Bilateral subclavian thrombosis\par UGI bleed [de-identified] : Taking Prednisone 40 mg daily.  Denies fatigue, fevers, chills, night sweats, palpitations, pain, bleeding.  Has been taking insulin daily and using coverage as needed.  In last  week, has had increased BLE swelling and swelling in hands.  Also, reports some dyspnea. Had fallen on 5/8 and went to ED, no injuries noted or found.      \par \par \par

## 2023-05-19 NOTE — HISTORY OF PRESENT ILLNESS
[FreeTextEntry1] : Mr. Baez is a 73 year old man with past medical history of a nonischemic cardiomyopathy and chronic systolic heart failure s/p HM2 LVAD in June 2017 complicated by recurrent GI hemorrhage and possible pump thrombosis who underwent heart transplant on 2/23/18 with a hepatitis C positive donor. His course was complicated by bilateral IJ/subclavian thrombi, a persistent small right sided pleural effusion, as well as acute on chronic renal failure of unclear etiology. In addition, his post-operative course was complicated by graft dysfunction of unclear etiology and persistent C4d positive staining on endomyocardial biopsy with negative DSAs. He developed joshua evidence of graft dysfunction leading to treatment with plasmapheresis, IVIG, and rituximab. Subsequently in June of 2018 he was found to have an pneumonia, that was ultimately diagnosed as Nocardia. This was successfully treated with therapy completed in August 2019.\par \par In the spring 2020, he was admitted with hemolytic anemia of unclear etiology. There were no clear precipitating factors, such as infection. He was treated with prednisone and Rituximab. Given the potential for CNI inhibitors leading to hemolytic anemia, we transitioned him to everolimus and he remained on high dose prednisone. He was subsequently admitted with acute anemia (not hemolytic). He developed severe leukopenia and Klebsiella bacteremia. Following treatment of this, he developed a severe C. diff colitis infection leading to a prolonged recovery. He was weaned to lower dose prednisone and the everolimus was transitioned back to tacrolimus. At the time of discharge he was found to have low levels of CMV viremia and was started back on valganciclovir. 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 Discharged home and had been following closely with heme/onc.\par \par Pt readmitted 3/3-3/18/23 who presented to [facility] ER on with new onset of chest tightness, found to be in Aflutter/fib. Hospital course c/b by URI w/ human metapneumovirus, normal LV function, acute on chronic kidney injury likely 2/2 hypovolemia, worsening leukocytosis with CT chest c/f PNA and hyperglycemia. Heme/onc consulted for hemolytic anemia management and prednisone dosing reduced to 60mg daily. Endocrine consulted for new onset of hyperglycemia and insulin teaching.  Bacterial infection-work up negative to date however had an elevated Fungitell.  Treated with broad spectrum abx from 3/3 to 3/8 (zosyn then cefepime).  Pt was cleared for discharge home on 3/18.\par \par Pt here today for follow up clinic visit in the setting of c/o BL LE edema. Pt has gained 10 pounds since last visit on 5/4. In the interim he went to the ED on 5/8 s/p fall at home without LOC and hurt his right knee. He received IVF's for SCr 1.9, b/l knee xrys were negative and he was discharged home.  Pt reports doing ok, sometimes has SOB but he continues to be able to walk 1.5 blocks with his cane. His right middle finger is still swollen and he has initial consult appt with Rheumatology next week. Pt still not keeping home VS log or regularily checking fingersticks.  He was seen by endocrine and monitor was placed.  He admits to not using nystatin qid but said he's taking veltessa bid.  He continues to have a HHA ~25 hours/week and visiting RN TIW. Denied SOB, CP, orthopnea, cough.  He has no fevers, chills, sweats, dysuria, hematuria, or headaches.  Appetite good, and sleeping well.  Attends [employer] most days M-F 8am-5pm. Of note, pt was seen by hematology and was being considered to start rituximab but deferred since pt reported b/l LE edema and some new SOB; prednisone was decreased from 40mg to 30mg daily.\par \par Health maintenance:\par DEXA 5/2019, 6/2021: osteopenia, 6/2022: worsening osteopenia. Followed by Dr. Clemens.\par CT Colon (4/2017) IMPRESSION: No colonic polyp or mass.\par PSA 6.76 (4/29/2022)\par HgA1c 4.7% (2/17/23) --> 7.3 % (5/18/23)

## 2023-05-19 NOTE — ASSESSMENT
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 Was in remission with well compensated hemolytic anemia on low dose prednisone, but relapsed after tapered to 3 mg daily. 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  Was hospitalized with aflutter, now taking Diltiazem. Also found to have metapneumovirus but no intervention was needed.  Now getting insulin coverage for elevated sugar levels. Hgb has dropped from 12.5 to 10.2, today 10.1.  Care discussed with Dr Rosario  Will monitor counts and consider alternate therapy with Rituxan.  Due to increased leg and arm edema in last few days, will refer to cardiology and hold off on Rituxan for now.  Will decrease Prednisone to 30 mg daily, pt aware of instructions.  \par \par Plan:\par Decrease Prednisone to 30 mg daily \par See Cardiology re: edema\par PCP prophylaxis as per Cardiology- is taking Bactrim\par CMP, LDH\par Tacrolimus/ ASA per Cardiology.\par Folic acid\par To ER prn \par RTC in 2 weeks\par

## 2023-05-19 NOTE — ASSESSMENT
[FreeTextEntry1] : The patient is a 73 year old male being seen in the office today for evaluation of steroid induced hyperglycemia. PMHx of  HTN, HLD, osteoporosis, NICM, chronic systolic heart failure s/p HM2 LVAD (6/2017) s/p heart transplant from Hep. C donor (treated) 2/23/18 (post op course complicated by graft dysfunction treated by plasmapheresis, IVIG, and rituximab). Recent admission to [facility] for chest tightness x1d found to be in aflutter 2/2 pneumonia and more recently found to have Hemolytic anemia and was started on steroids. Endocrinology consulted during hospitalization for management steroid induced hyperglycemia. Currently on Prednisone 40 mg daily. Patient unsure of steroid taper plan moving forward, following hematology. \par \par Very poor historian and poor medical literacy. Very difficult to obtain pertinent medical information from patient 2/2 poor health literacy, seems unsure of insulin dosing he is taking. Has aide with him who joined us for office visit. Aide helps patient from 8AM to 1PM, patient lives with his brother but does not have help with his medical care once the aide leaves for the day.\par \par Steroid-induced hyperglycemia\par - A1c: 4.7 % 2/17/23, today 7.3%\par - Current regimen: \par - Lantus 18 units QHS\par - Admelog 6 units with breakfast (which is a lot less than what was recommended for premeal breakfast insulin upon discharge from the hospital) will recheck BG at 12 pm if high will give himself 10-12 units of insulin, eats at 1pm without giving more insulin, states he does not eat dinner\par - Plan: increase premeal admelog to 8 units before breakfast (patient was on much higher premeal insulin dose prior to breakfast while in hospital, however based on reported BG will increase to 8 units for now). Unable to make changes to other insulin dosing due to limited data. Placed professional nusrat, follow up in 2 weeks for nusrat review and further recommendations. Asked patient bring all medications to next visit, also keep log book of how much insulin is given and food log. \par - BG monitoring: Continue to check BG before meals and bedtime. \par - Labs: labs from hospitalization reviewed\par - Preventive: \par  Nephropathy screening: currently states unable to leave urine specimen for urine ACR, obtain at next visit\par  Retinopathy screening: overdue, recommend follow up\par  Foot exam/podiatrist: recommend to follow with podiatry \par - Counseling: We discussed diabetes foot care, long term complications of diabetes including but not limited to neuropathy, nephropathy, retinopathy and cardiovascular disease and the benefits of good glycemic control in preventing said complications. We discussed the risks and benefits of diabetes medications and/or insulin as relevant for today, prevention and management of hypoglycemia, importance of medication compliance and blood glucose monitoring.\par - Discussed diabetic diet, decreased intake of simple/processed sugars, increase intake of whole foods with protein and fiber. Increase physical activity as tolerated with cardiovascular exercise 150 min/per week consistently and resistance exercise three days per week. \par \par HTN\par - Losartan 25mg once a day\par - Metoprolol succinate ER 200mg daily \par  \par  HLD\par - pravastatin 20mg daily\par - LDL goal <70. Last LDL 51\par \par Osteoporosis\par - Following Dr. Clemens\par - On Prolia, last Prolia injection 2/2023\par \par Follow up in 2 weeks for professional nusrat review and further management\par \par Fernanda Cole PA-C\par \par \par \par

## 2023-05-19 NOTE — PHYSICAL EXAM
[Well Developed] : well developed [Well Nourished] : well nourished [Normal] : normal S1, S2, no murmur, no rub, no gallop [Normal S1, S2] : normal S1, S2 [Soft] : abdomen soft [Non Tender] : non-tender [de-identified] : + jvd [de-identified] : +III/VI diastolic murmur, tachycardic [de-identified] : 3+ b/l LE edema

## 2023-05-19 NOTE — PHYSICAL EXAM
[Alert] : alert [No Acute Distress] : no acute distress [Normal Sclera/Conjunctiva] : normal sclera/conjunctiva [No Proptosis] : no proptosis [Supple] : the neck was supple [Thyroid Not Enlarged] : the thyroid was not enlarged [No Thyroid Nodules] : no palpable thyroid nodules [No Respiratory Distress] : no respiratory distress [No Accessory Muscle Use] : no accessory muscle use [Normal Rate and Effort] : normal respiratory rate and effort [Clear to Auscultation] : lungs were clear to auscultation bilaterally [Normal S1, S2] : normal S1 and S2 [Normal Rate] : heart rate was normal [Regular Rhythm] : with a regular rhythm [Not Tender] : non-tender [Not Distended] : not distended [Soft] : abdomen soft [de-identified] : 1+ b/l LE pitting edema  [de-identified] : seen sitting in walker  [de-identified] : Poor medical literacy, poor insight

## 2023-05-19 NOTE — HISTORY OF PRESENT ILLNESS
[FreeTextEntry1] : The patient is a 73 year old male being seen in the office today for evaluation of steroid induced hyperglycemia. PMHx of  HTN, HLD, osteoporosis, NICM, chronic systolic heart failure s/p HM2 LVAD (6/2017) s/p heart transplant from Hep. C donor (treated) 2/23/18 (post op course complicated by graft dysfunction treated by plasmapheresis, IVIG, and rituximab). Recent admission to [facility] for chest tightness x1d found to be in aflutter 2/2 pneumonia and more recently found to have Hemolytic anemia and was started on steroids. Endocrinology consulted during hospitalization for management steroid induced hyperglycemia. Currently on Prednisone 40 mg daily. Patient unsure of steroid taper plan moving forward, following hematology. Very poor historian and poor medical literacy.\par \par Type of Diabetes: Steroid-induced hyperglycemia\par A1c: 4.7 % 2/17/23, today 7.3%\par Very difficult to obtain pertinent medical information from patient 2/2 poor health literacy, seems unsure of insulin dosing he is taking. Has aide with him who joined us for office visit. Aide helps patient from 8AM to 1PM, patient lives with his brother but does not have help with his medical care once the aide leaves for the day.\par States he takes Lantus 18 units QHS\par States he takes Admelog 6 units with breakfast will recheck BG at 12 pm if high will give himself 10-12 units of insulin, eats at 1pm without giving more insulin, states he does not eat dinner\par Past medications: denies \par Insulin injection sites: stomach \par \par Diabetes complications:\par Microvascular: [-] neuropathy, [+] nephropathy, [-] retinopathy\par Macrovascular: [-] CVA, [-] PAD, + NICM, chronic systolic heart failure s/p HM2 LVAD (6/2017) s/p heart transplant\par History of DKA/hospitalizations due to Diabetes: denies\par \par Last retinopathy screening: has not seen optho for over 1 year, does not think he has retinopathy, states he has cataract  \par Last nephropathy screening (urine ACR): no urine ACR on file, Cr and GFR as below\par Last foot exam/podiatry visit: follows with podiatry, last seen about 1 year ago. Denies active wounds or infections with feet\par \par BG monitoring: States he checks BG 1-2x/day usually when aide is present, does not check afterwards, did not bring in glucometer\par BG Trends:\par - Before Breakfast: 175, 129, 211, 230, 171, 251, 120, 129, 169, 173\par - 2 hours after breakfast:172, 182, 166, 326, 540\par Hypoglycemia events: denies\par \par Diet: eats 2 meals a day, sometimes 3, 24 hour food recall:\par 9:30-12 Breakfast: Grits and hillman\par 1:30PM Lunch: meatballs and spaghetti\par Dinner: cheese doodles, pretzels\par Drinks: water and pineapple juice\par  \par Exercise: not exercising 2/2 limited mobility, uses cane\par Steroid intake: currently on Prednisone 40mg daily \par \par Family history of diabetes: denies DM hx in family \par Social history: lives with brother\par \par Denies blurry vision, polydypsia, or polyuria\par \par Comorbidities: HTN, HLD\par \par HTN\par - Losartan 25mg once a day\par - Metoprolol succinate ER 200mg daily \par  \par  HLD\par - pravastatin 20mg daily\par - LDL goal <70. Last LDL 51\par \par Osteoporosis\par - Following Dr. Clemens\par - On Prolia, last Prolia injection 2/2023\par   \par PERTINENT LABS:\par LABS:\par POCT Blood Glucose.: 152 mg/dL (03-18-23 @ 08:41)\par POCT Blood Glucose.: 90 mg/dL (03-18-23 @ 06:51)\par POCT Blood Glucose.: 68 mg/dL (03-18-23 @ 06:29)\par POCT Blood Glucose.: 106 mg/dL (03-17-23 @ 22:11)\par POCT Blood Glucose.: 140 mg/dL (03-17-23 @ 17:52)\par POCT Blood Glucose.: 146 mg/dL (03-17-23 @ 12:48)\par POCT Blood Glucose.: 126 mg/dL (03-17-23 @ 08:32)\par POCT Blood Glucose.: 99 mg/dL (03-17-23 @ 03:58)\par POCT Blood Glucose.: 107 mg/dL (03-16-23 @ 21:36)\par POCT Blood Glucose.: 183 mg/dL (03-16-23 @ 17:24)\par POCT Blood Glucose.: 255 mg/dL (03-16-23 @ 13:16)\par POCT Blood Glucose.: 160 mg/dL (03-16-23 @ 09:07)\par POCT Blood Glucose.: 248 mg/dL (03-16-23 @ 03:07)\par POCT Blood Glucose.: 134 mg/dL (03-15-23 @ 23:24)\par POCT Blood Glucose.: 71 mg/dL (03-15-23 @ 22:23)\par POCT Blood Glucose.: 75 mg/dL (03-15-23 @ 21:43)\par POCT Blood Glucose.: 269 mg/dL (03-15-23 @ 17:17)\par POCT Blood Glucose.: 457 mg/dL (03-15-23 @ 13:36)\par POCT Blood Glucose.: 445 mg/dL (03-15-23 @ 13:34)\par  \par  \par            10.6\par 17.23 )-----------( 287      ( 17 Mar 2023 12:03 )\par            35.1\par  \par 03-17\par  \par 138  |  103  |  34<H>\par ----------------------------<  154<H>\par 5.2   |  23  |  1.58<H>\par  \par Ca    7.3<L>      17 Mar 2023 12:03\par Phos  2.0     03-17\par Mg     1.4     03-17\par  \par  \par eGFR: 46 mL/min/1.73m2 (17 Mar 2023 12:03)\par eGFR: 48 mL/min/1.73m2 (17 Mar 2023 12:03)\par  \par 02-17 Chol 143 Direct LDL -- LDL calculated 51 HDL 69 Trig 116\par Thyroid Function Tests:  03-03 @ 15:09 TSH 1.06 FreeT4 -- T3 -- Anti TPO --\par Anti Thyroglobulin Ab -- TSI --\par  \par A1C with Estimated Average Glucose Result: 4.7 % (02-17-23 @ 11:59)\par  \par Estimated Average Glucose: 88 mg/dL (02-17-23 @ 11:59)\par \par

## 2023-05-22 ENCOUNTER — NON-APPOINTMENT (OUTPATIENT)
Age: 73
End: 2023-05-22

## 2023-05-22 NOTE — PATIENT PROFILE ADULT. - NSSUBSTANCEUSE_GEN_ALL_CORE_SD
Left pt seated in b/s chair in care of MOC, RN aware. Educ pt on AAROM exercises for RUE with good return demo.
never used

## 2023-05-23 ENCOUNTER — APPOINTMENT (OUTPATIENT)
Dept: HEART FAILURE | Facility: CLINIC | Age: 73
End: 2023-05-23
Payer: MEDICARE

## 2023-05-23 ENCOUNTER — INPATIENT (INPATIENT)
Facility: HOSPITAL | Age: 73
LOS: 9 days | Discharge: HOME HEALTH | DRG: 291 | End: 2023-06-02
Attending: INTERNAL MEDICINE | Admitting: HOSPITALIST
Payer: MEDICARE

## 2023-05-23 ENCOUNTER — NON-APPOINTMENT (OUTPATIENT)
Age: 73
End: 2023-05-23

## 2023-05-23 ENCOUNTER — RX RENEWAL (OUTPATIENT)
Age: 73
End: 2023-05-23

## 2023-05-23 VITALS
SYSTOLIC BLOOD PRESSURE: 129 MMHG | HEART RATE: 110 BPM | DIASTOLIC BLOOD PRESSURE: 87 MMHG | OXYGEN SATURATION: 97 % | HEIGHT: 66 IN | WEIGHT: 180 LBS | BODY MASS INDEX: 28.93 KG/M2 | TEMPERATURE: 97.4 F

## 2023-05-23 VITALS
WEIGHT: 179.9 LBS | DIASTOLIC BLOOD PRESSURE: 85 MMHG | HEIGHT: 67 IN | HEART RATE: 99 BPM | OXYGEN SATURATION: 99 % | TEMPERATURE: 98 F | RESPIRATION RATE: 16 BRPM | SYSTOLIC BLOOD PRESSURE: 120 MMHG

## 2023-05-23 VITALS — DIASTOLIC BLOOD PRESSURE: 81 MMHG | SYSTOLIC BLOOD PRESSURE: 114 MMHG

## 2023-05-23 VITALS — HEART RATE: 106 BPM

## 2023-05-23 DIAGNOSIS — I50.9 HEART FAILURE, UNSPECIFIED: ICD-10-CM

## 2023-05-23 DIAGNOSIS — I50.23 ACUTE ON CHRONIC SYSTOLIC (CONGESTIVE) HEART FAILURE: ICD-10-CM

## 2023-05-23 DIAGNOSIS — Z94.1 HEART TRANSPLANT STATUS: ICD-10-CM

## 2023-05-23 DIAGNOSIS — R73.9 HYPERGLYCEMIA, UNSPECIFIED: ICD-10-CM

## 2023-05-23 DIAGNOSIS — Z94.1 HEART TRANSPLANT STATUS: Chronic | ICD-10-CM

## 2023-05-23 DIAGNOSIS — D84.9 IMMUNODEFICIENCY, UNSPECIFIED: ICD-10-CM

## 2023-05-23 DIAGNOSIS — Z29.9 ENCOUNTER FOR PROPHYLACTIC MEASURES, UNSPECIFIED: ICD-10-CM

## 2023-05-23 DIAGNOSIS — E78.5 HYPERLIPIDEMIA, UNSPECIFIED: ICD-10-CM

## 2023-05-23 DIAGNOSIS — D58.9 HEREDITARY HEMOLYTIC ANEMIA, UNSPECIFIED: ICD-10-CM

## 2023-05-23 DIAGNOSIS — I42.8 OTHER CARDIOMYOPATHIES: ICD-10-CM

## 2023-05-23 DIAGNOSIS — I10 ESSENTIAL (PRIMARY) HYPERTENSION: ICD-10-CM

## 2023-05-23 DIAGNOSIS — N17.9 ACUTE KIDNEY FAILURE, UNSPECIFIED: ICD-10-CM

## 2023-05-23 DIAGNOSIS — D69.41 EVANS SYNDROME: ICD-10-CM

## 2023-05-23 LAB
ALBUMIN SERPL ELPH-MCNC: 3.9 G/DL — SIGNIFICANT CHANGE UP (ref 3.3–5)
ALP SERPL-CCNC: 61 U/L — SIGNIFICANT CHANGE UP (ref 40–120)
ALT FLD-CCNC: 25 U/L — SIGNIFICANT CHANGE UP (ref 10–45)
ANION GAP SERPL CALC-SCNC: 12 MMOL/L — SIGNIFICANT CHANGE UP (ref 5–17)
ANISOCYTOSIS BLD QL: SLIGHT — SIGNIFICANT CHANGE UP
AST SERPL-CCNC: 16 U/L — SIGNIFICANT CHANGE UP (ref 10–40)
BASE EXCESS BLDV CALC-SCNC: 0.2 MMOL/L — SIGNIFICANT CHANGE UP (ref -2–3)
BASOPHILS # BLD AUTO: 0 K/UL — SIGNIFICANT CHANGE UP (ref 0–0.2)
BASOPHILS NFR BLD AUTO: 0 % — SIGNIFICANT CHANGE UP (ref 0–2)
BILIRUB SERPL-MCNC: 0.7 MG/DL — SIGNIFICANT CHANGE UP (ref 0.2–1.2)
BUN SERPL-MCNC: 36 MG/DL — HIGH (ref 7–23)
CA-I SERPL-SCNC: 1.19 MMOL/L — SIGNIFICANT CHANGE UP (ref 1.15–1.33)
CALCIUM SERPL-MCNC: 8.5 MG/DL — SIGNIFICANT CHANGE UP (ref 8.4–10.5)
CHLORIDE BLDV-SCNC: 98 MMOL/L — SIGNIFICANT CHANGE UP (ref 96–108)
CHLORIDE SERPL-SCNC: 97 MMOL/L — SIGNIFICANT CHANGE UP (ref 96–108)
CO2 BLDV-SCNC: 30 MMOL/L — HIGH (ref 22–26)
CO2 SERPL-SCNC: 27 MMOL/L — SIGNIFICANT CHANGE UP (ref 22–31)
CREAT SERPL-MCNC: 1.6 MG/DL — HIGH (ref 0.5–1.3)
DACRYOCYTES BLD QL SMEAR: SLIGHT — SIGNIFICANT CHANGE UP
EGFR: 45 ML/MIN/1.73M2 — LOW
EOSINOPHIL # BLD AUTO: 0 K/UL — SIGNIFICANT CHANGE UP (ref 0–0.5)
EOSINOPHIL NFR BLD AUTO: 0 % — SIGNIFICANT CHANGE UP (ref 0–6)
GAS PNL BLDV: 132 MMOL/L — LOW (ref 136–145)
GAS PNL BLDV: SIGNIFICANT CHANGE UP
GAS PNL BLDV: SIGNIFICANT CHANGE UP
GLUCOSE BLDV-MCNC: 265 MG/DL — HIGH (ref 70–99)
GLUCOSE SERPL-MCNC: 279 MG/DL — HIGH (ref 70–99)
HCO3 BLDV-SCNC: 28 MMOL/L — SIGNIFICANT CHANGE UP (ref 22–29)
HCT VFR BLD CALC: 33.6 % — LOW (ref 39–50)
HCT VFR BLDA CALC: 33 % — LOW (ref 39–51)
HGB BLD CALC-MCNC: 10.9 G/DL — LOW (ref 12.6–17.4)
HGB BLD-MCNC: 10.3 G/DL — LOW (ref 13–17)
INR BLD: 1.43 RATIO — HIGH (ref 0.88–1.16)
LACTATE BLDV-MCNC: 3.2 MMOL/L — HIGH (ref 0.5–2)
LYMPHOCYTES # BLD AUTO: 0.1 K/UL — LOW (ref 1–3.3)
LYMPHOCYTES # BLD AUTO: 0.9 % — LOW (ref 13–44)
MACROCYTES BLD QL: SLIGHT — SIGNIFICANT CHANGE UP
MANUAL SMEAR VERIFICATION: SIGNIFICANT CHANGE UP
MCHC RBC-ENTMCNC: 30.7 GM/DL — LOW (ref 32–36)
MCHC RBC-ENTMCNC: 32.3 PG — SIGNIFICANT CHANGE UP (ref 27–34)
MCV RBC AUTO: 105.3 FL — HIGH (ref 80–100)
MONOCYTES # BLD AUTO: 0 K/UL — SIGNIFICANT CHANGE UP (ref 0–0.9)
MONOCYTES NFR BLD AUTO: 0 % — LOW (ref 2–14)
MYELOCYTES NFR BLD: 0.9 % — HIGH (ref 0–0)
NEUTROPHILS # BLD AUTO: 10.4 K/UL — HIGH (ref 1.8–7.4)
NEUTROPHILS NFR BLD AUTO: 96.4 % — HIGH (ref 43–77)
NEUTS BAND # BLD: 0.9 % — SIGNIFICANT CHANGE UP (ref 0–8)
NT-PROBNP SERPL-SCNC: 554 PG/ML — HIGH (ref 0–300)
OVALOCYTES BLD QL SMEAR: SLIGHT — SIGNIFICANT CHANGE UP
PCO2 BLDV: 63 MMHG — HIGH (ref 42–55)
PH BLDV: 7.26 — LOW (ref 7.32–7.43)
PLAT MORPH BLD: NORMAL — SIGNIFICANT CHANGE UP
PLATELET # BLD AUTO: 189 K/UL — SIGNIFICANT CHANGE UP (ref 150–400)
PO2 BLDV: 21 MMHG — LOW (ref 25–45)
POIKILOCYTOSIS BLD QL AUTO: SLIGHT — SIGNIFICANT CHANGE UP
POLYCHROMASIA BLD QL SMEAR: SLIGHT — SIGNIFICANT CHANGE UP
POTASSIUM BLDV-SCNC: 4.9 MMOL/L — SIGNIFICANT CHANGE UP (ref 3.5–5.1)
POTASSIUM SERPL-MCNC: 5 MMOL/L — SIGNIFICANT CHANGE UP (ref 3.5–5.3)
POTASSIUM SERPL-SCNC: 5 MMOL/L — SIGNIFICANT CHANGE UP (ref 3.5–5.3)
PROMYELOCYTES # FLD: 0.9 % — HIGH (ref 0–0)
PROT SERPL-MCNC: 5.7 G/DL — LOW (ref 6–8.3)
PROTHROM AB SERPL-ACNC: 16.6 SEC — HIGH (ref 10.5–13.4)
RBC # BLD: 3.19 M/UL — LOW (ref 4.2–5.8)
RBC # FLD: 16.9 % — HIGH (ref 10.3–14.5)
RBC BLD AUTO: ABNORMAL
SAO2 % BLDV: 17.6 % — LOW (ref 67–88)
SODIUM SERPL-SCNC: 136 MMOL/L — SIGNIFICANT CHANGE UP (ref 135–145)
TROPONIN T, HIGH SENSITIVITY RESULT: 31 NG/L — SIGNIFICANT CHANGE UP (ref 0–51)
TROPONIN T, HIGH SENSITIVITY RESULT: 39 NG/L — SIGNIFICANT CHANGE UP (ref 0–51)
WBC # BLD: 10.69 K/UL — HIGH (ref 3.8–10.5)
WBC # FLD AUTO: 10.69 K/UL — HIGH (ref 3.8–10.5)

## 2023-05-23 PROCEDURE — 99223 1ST HOSP IP/OBS HIGH 75: CPT

## 2023-05-23 PROCEDURE — 93000 ELECTROCARDIOGRAM COMPLETE: CPT

## 2023-05-23 PROCEDURE — 99215 OFFICE O/P EST HI 40 MIN: CPT | Mod: 25

## 2023-05-23 PROCEDURE — 71045 X-RAY EXAM CHEST 1 VIEW: CPT | Mod: 26

## 2023-05-23 PROCEDURE — 99285 EMERGENCY DEPT VISIT HI MDM: CPT

## 2023-05-23 PROCEDURE — 70450 CT HEAD/BRAIN W/O DYE: CPT | Mod: 26

## 2023-05-23 RX ORDER — METOPROLOL TARTRATE 50 MG
200 TABLET ORAL DAILY
Refills: 0 | Status: DISCONTINUED | OUTPATIENT
Start: 2023-05-23 | End: 2023-06-02

## 2023-05-23 RX ORDER — FUROSEMIDE 40 MG
40 TABLET ORAL ONCE
Refills: 0 | Status: COMPLETED | OUTPATIENT
Start: 2023-05-23 | End: 2023-05-23

## 2023-05-23 RX ORDER — FUROSEMIDE 40 MG
40 TABLET ORAL DAILY
Refills: 0 | Status: DISCONTINUED | OUTPATIENT
Start: 2023-05-23 | End: 2023-05-24

## 2023-05-23 RX ORDER — INSULIN LISPRO 100/ML
VIAL (ML) SUBCUTANEOUS
Refills: 0 | Status: DISCONTINUED | OUTPATIENT
Start: 2023-05-23 | End: 2023-05-27

## 2023-05-23 RX ORDER — LOSARTAN POTASSIUM 100 MG/1
25 TABLET, FILM COATED ORAL DAILY
Refills: 0 | Status: DISCONTINUED | OUTPATIENT
Start: 2023-05-23 | End: 2023-05-24

## 2023-05-23 RX ORDER — ATORVASTATIN CALCIUM 80 MG/1
10 TABLET, FILM COATED ORAL AT BEDTIME
Refills: 0 | Status: DISCONTINUED | OUTPATIENT
Start: 2023-05-23 | End: 2023-06-02

## 2023-05-23 RX ORDER — FOLIC ACID 0.8 MG
1 TABLET ORAL DAILY
Refills: 0 | Status: DISCONTINUED | OUTPATIENT
Start: 2023-05-23 | End: 2023-06-02

## 2023-05-23 RX ORDER — LANOLIN ALCOHOL/MO/W.PET/CERES
3 CREAM (GRAM) TOPICAL AT BEDTIME
Refills: 0 | Status: DISCONTINUED | OUTPATIENT
Start: 2023-05-23 | End: 2023-06-02

## 2023-05-23 RX ORDER — ACETAMINOPHEN 500 MG
650 TABLET ORAL EVERY 6 HOURS
Refills: 0 | Status: DISCONTINUED | OUTPATIENT
Start: 2023-05-23 | End: 2023-06-02

## 2023-05-23 RX ORDER — INSULIN LISPRO 100/ML
10 VIAL (ML) SUBCUTANEOUS
Refills: 0 | Status: DISCONTINUED | OUTPATIENT
Start: 2023-05-23 | End: 2023-05-26

## 2023-05-23 RX ORDER — DEXTROSE 50 % IN WATER 50 %
15 SYRINGE (ML) INTRAVENOUS ONCE
Refills: 0 | Status: DISCONTINUED | OUTPATIENT
Start: 2023-05-23 | End: 2023-06-02

## 2023-05-23 RX ORDER — NYSTATIN 500MM UNIT
500000 POWDER (EA) MISCELLANEOUS THREE TIMES A DAY
Refills: 0 | Status: DISCONTINUED | OUTPATIENT
Start: 2023-05-23 | End: 2023-06-02

## 2023-05-23 RX ORDER — PANTOPRAZOLE SODIUM 20 MG/1
40 TABLET, DELAYED RELEASE ORAL
Refills: 0 | Status: DISCONTINUED | OUTPATIENT
Start: 2023-05-23 | End: 2023-06-02

## 2023-05-23 RX ORDER — VALGANCICLOVIR 450 MG/1
450 TABLET, FILM COATED ORAL DAILY
Refills: 0 | Status: DISCONTINUED | OUTPATIENT
Start: 2023-05-23 | End: 2023-05-27

## 2023-05-23 RX ORDER — DEXTROSE 50 % IN WATER 50 %
25 SYRINGE (ML) INTRAVENOUS ONCE
Refills: 0 | Status: DISCONTINUED | OUTPATIENT
Start: 2023-05-23 | End: 2023-06-02

## 2023-05-23 RX ORDER — INSULIN LISPRO 100/ML
26 VIAL (ML) SUBCUTANEOUS
Refills: 0 | Status: DISCONTINUED | OUTPATIENT
Start: 2023-05-23 | End: 2023-05-26

## 2023-05-23 RX ORDER — FEBUXOSTAT 40 MG/1
40 TABLET ORAL DAILY
Refills: 0 | Status: DISCONTINUED | OUTPATIENT
Start: 2023-05-23 | End: 2023-06-02

## 2023-05-23 RX ORDER — INSULIN GLARGINE 100 [IU]/ML
18 INJECTION, SOLUTION SUBCUTANEOUS AT BEDTIME
Refills: 0 | Status: DISCONTINUED | OUTPATIENT
Start: 2023-05-23 | End: 2023-05-24

## 2023-05-23 RX ORDER — DEXTROSE 50 % IN WATER 50 %
12.5 SYRINGE (ML) INTRAVENOUS ONCE
Refills: 0 | Status: DISCONTINUED | OUTPATIENT
Start: 2023-05-23 | End: 2023-06-02

## 2023-05-23 RX ORDER — GLUCAGON INJECTION, SOLUTION 0.5 MG/.1ML
1 INJECTION, SOLUTION SUBCUTANEOUS ONCE
Refills: 0 | Status: DISCONTINUED | OUTPATIENT
Start: 2023-05-23 | End: 2023-06-02

## 2023-05-23 RX ORDER — ASPIRIN/CALCIUM CARB/MAGNESIUM 324 MG
81 TABLET ORAL DAILY
Refills: 0 | Status: DISCONTINUED | OUTPATIENT
Start: 2023-05-23 | End: 2023-06-02

## 2023-05-23 RX ORDER — INSULIN LISPRO 100/ML
VIAL (ML) SUBCUTANEOUS AT BEDTIME
Refills: 0 | Status: DISCONTINUED | OUTPATIENT
Start: 2023-05-23 | End: 2023-06-02

## 2023-05-23 RX ORDER — SENNA PLUS 8.6 MG/1
2 TABLET ORAL AT BEDTIME
Refills: 0 | Status: DISCONTINUED | OUTPATIENT
Start: 2023-05-23 | End: 2023-06-02

## 2023-05-23 RX ORDER — SODIUM CHLORIDE 9 MG/ML
1000 INJECTION, SOLUTION INTRAVENOUS
Refills: 0 | Status: DISCONTINUED | OUTPATIENT
Start: 2023-05-23 | End: 2023-06-02

## 2023-05-23 RX ORDER — APIXABAN 2.5 MG/1
5 TABLET, FILM COATED ORAL EVERY 12 HOURS
Refills: 0 | Status: DISCONTINUED | OUTPATIENT
Start: 2023-05-23 | End: 2023-06-02

## 2023-05-23 RX ORDER — ONDANSETRON 8 MG/1
4 TABLET, FILM COATED ORAL EVERY 8 HOURS
Refills: 0 | Status: DISCONTINUED | OUTPATIENT
Start: 2023-05-23 | End: 2023-06-02

## 2023-05-23 RX ORDER — INSULIN LISPRO 100/ML
6 VIAL (ML) SUBCUTANEOUS
Refills: 0 | Status: DISCONTINUED | OUTPATIENT
Start: 2023-05-23 | End: 2023-05-26

## 2023-05-23 RX ORDER — TACROLIMUS 5 MG/1
2.5 CAPSULE ORAL
Refills: 0 | Status: DISCONTINUED | OUTPATIENT
Start: 2023-05-23 | End: 2023-05-31

## 2023-05-23 RX ADMIN — Medication 40 MILLIGRAM(S): at 21:16

## 2023-05-23 NOTE — H&P ADULT - PROBLEM/PLAN-8
[Follow-Up Visit] : a follow-up visit for [CLL] : chronic lymphocytic leukemia [Spouse] : spouse DISPLAY PLAN FREE TEXT

## 2023-05-23 NOTE — ED ADULT NURSE NOTE - OBJECTIVE STATEMENT
73Y male, A&)x4, with PMHx of nonischemic cardiomyopathy, systolic HF s/p heart transplant (2018). pt presents to the ED sent in by heart failure team for admission. Patient had appointment today at heart failure clinic. He states that today he has felt more shortness of breath. Legs have been more swollen lately at home. Patient states that his doctor states that he needs to be hospitalized for "medication". He states that while has at the appointment he accidently slipped and almost fell. Someone caught him. He did not sustain any injuries. No head trauma or LOC. Denies any recent illness, fever, chills, chest pain, cough, abdominal pain, n/v/d. States that he has been compliant with his medications.

## 2023-05-23 NOTE — PHYSICAL EXAM
[Well Developed] : well developed [Well Nourished] : well nourished [Normal] : normal S1, S2, no murmur, no rub, no gallop [Normal S1, S2] : normal S1, S2 [Soft] : abdomen soft [Non Tender] : non-tender [de-identified] : + jvd [de-identified] : +III/VI diastolic murmur, tachycardic [de-identified] : 3+ b/l LE edema

## 2023-05-23 NOTE — ED PROVIDER NOTE - OBJECTIVE STATEMENT
74 y/o male with PMHx of nonischemic cardiomyopathy, systolic HF s/p heart transplant (2018) presents to the ED sent in by heart failure team for admission. Patient had appointment today at heart failure clinic. He states that today he has felt more shortness of breath. Legs have been more swollen lately at home. Patient states that his doctor states that he needs to be hospitalized for "medication". He states that while has at the appointment he accidently slipped and almost fell. Someone caught him. He did not sustain any injuries. No head trauma or LOC. Denies any recent illness, fever, chills, chest pain, cough, abdominal pain, n/v/d. States that he has been compliant with his medications.

## 2023-05-23 NOTE — ED PROVIDER NOTE - NS ED ATTENDING STATEMENT MOD
This was a shared visit with the CHIKIS. I reviewed and verified the documentation and independently performed the documented:

## 2023-05-23 NOTE — H&P ADULT - NSHPLABSRESULTS_GEN_ALL_CORE
10.3   10.69 )-----------( 189      ( 23 May 2023 18:47 )             33.6       05-23    136  |  97  |  36<H>  ----------------------------<  279<H>  5.0   |  27  |  1.60<H>    Ca    8.5      23 May 2023 18:47    TPro  5.7<L>  /  Alb  3.9  /  TBili  0.7  /  DBili  x   /  AST  16  /  ALT  25  /  AlkPhos  61  05-23      Troponin T, High Sensitivity Result: 39: Specimen not hemolyzed (05.23.23 @ 18:47)      - - - - - - - - - - - - - - - - - - - - - - - - - - - - - - - - - - - - - - - - - - - - - - - - - - - - - - - - - - - - - - - - - - - - - - - - - - - - - - - - - - - - - - - - - - - - - - - - - - - - - - - - -       EKG personally reviewed:  NSR 99 bpm , RBBB    Images personally reviewed:     < from: Xray Chest 1 View-PORTABLE IMMEDIATE (Xray Chest 1 View-PORTABLE IMMEDIATE .) (05.23.23 @ 17:19) >  IMPRESSION:  Right basilar atelectasis with elevation of the hemidiaphragm, similar   appearance to prior chest x-ray.    Trace bilateral pleural effusions.      < from: CT Head No Cont (05.23.23 @ 20:32) >  IMPRESSION:  No evidence of an acute hemorrhage, extra-axial fluid collection or midline shift.        < from: TTE W or WO Ultrasound Enhancing Agent (05.19.23 @ 11:54) >  CONCLUSIONS:      1. The left ventricular systolic function is mildly decreased with an ejection fraction of 51 % by Sherman's method of disks. There is global left ventricular hypokinesis.   2. Mildly enlarged right ventricular cavity size and probably normal systolic function.   3. 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   4. No significant valvular disease.   5. No pericardial effusion seen.   6. Compared to the transthoracic echocardiogram performed on 1/5/2023 There is a decrease in LVEF. On the previous study, the LV was hyperdynamic. There is more turbulence seen in the RV compared to the previous. RV size is mildly dilated but function appears similar. . Findings were discussed with Dr. Campbell on 5/19/2023 at 4:30 pm.

## 2023-05-23 NOTE — ED ADULT TRIAGE NOTE - CHIEF COMPLAINT QUOTE
hx of heart transplant 2018  was at routine follow up today and sent in for admission for  "volume overload s/p mechanical fail frailty"

## 2023-05-23 NOTE — H&P ADULT - ASSESSMENT
73y M PMH  HTN, HLD, Nonischemic cardiomyopathy, chronic systolic heart failure s/p HM2 LVAD (6/2017), s/p heart transplant from Hep. C donor (treated) 2/23/18 (post op course complicated by graft dysfunction treated by plasmapheresis, IVIG, and rituximab), being sent from HF clinic for worsening sx admitted for further care

## 2023-05-23 NOTE — H&P ADULT - HISTORY OF PRESENT ILLNESS
73y M PMH  HTN, HLD, Nonischemic cardiomyopathy, chronic systolic heart failure s/p HM2 LVAD (6/2017), s/p heart transplant from Hep. C donor (treated) 2/23/18 (post op course complicated by graft dysfunction treated by plasmapheresis, IVIG, and rituximab), being sent from HF clinic for worsening sx. Has been experiencing worsening SOB, increased LE edema, weakness and near syncopal episode. Pt has experienced wt gain and have been requiring increasing doses of diuretics at home. He had near syncopal episode while at the HF clinic today in presence of dr, no fall, injury, head trauma, LOC. Given worsening sx, was sent for further management and Iv diuretics     Denies CP, palpitation, N/V/D, fever, cough, chills, dizziness, abm pain        73y M PMH  HTN, HLD, Nonischemic cardiomyopathy, chronic systolic heart failure s/p HM2 LVAD (6/2017), s/p heart transplant from Hep. C donor (treated) 2/23/18 (post op course complicated by graft dysfunction treated by plasmapheresis, IVIG, and rituximab), being sent from HF clinic for worsening sx. Has been experiencing worsening SOB, increased LE edema, weakness and near syncopal episode. Pt has experienced wt gain and have been requiring increasing doses of diuretics at home. He had near syncopal episode while at the HF clinic today in presence of Raymond Beebe no fall, injury, head trauma, LOC. Given worsening sx, was sent for further management and Iv diuretics     Denies CP, palpitation, N/V/D, fever, cough, chills, dizziness, abm pain

## 2023-05-23 NOTE — ED PROVIDER NOTE - PHYSICAL EXAMINATION
CONSTITUTIONAL: Patient is awake, alert and oriented x 3. Patient is mildly dyspneic;   EYES: PERRL bilaterally,   ENT: Airway patent, Nasal mucosa clear.  NECK: Supple,   LUNGS: (+) bibasilar rales;   HEART: RRR.+S1S2  ABDOMEN: Soft, non-tender to palpation throughout all four quadrants,   MSK: (+) pitting edema b/l LE:   SKIN: No rash or lesions  NEURO: No focal deficits,

## 2023-05-23 NOTE — H&P ADULT - PROBLEM SELECTOR PLAN 6
- Cr 1.6  improved from 2 noted on prior admission, (baseline ~ 1.5)  - Trend labs, monitor renal function   - Avoid nephrotoxic meds

## 2023-05-23 NOTE — H&P ADULT - NSHPPHYSICALEXAM_GEN_ALL_CORE
T(C): 36.4 (05-23-23 @ 18:27), Max: 36.6 (05-23-23 @ 15:24)  HR: 91 (05-23-23 @ 18:27) (91 - 99)  BP: 116/85 (05-23-23 @ 18:27) (116/85 - 120/85)  RR: 17 (05-23-23 @ 18:27) (16 - 17)  SpO2: 100% (05-23-23 @ 18:27) (99% - 100%)    CONSTITUTIONAL: Well groomed, no apparent distress  EYES: PERRLA and symmetric, EOMI  ENMT: MMM. Normal dentition  RESP: No respiratory distress, no use of accessory muscles; CTA b/l, no WRR  CV: +S1S2, RRR, no MRG; no peripheral edema  GI: Soft, NTND, no RGR; no palpable masses  MSK: Normal gait; No digital clubbing or cyanosis; normal pain free ROM x4 extremities   SKIN: No rashes or ulcers noted  NEURO: CN II-XII grossly intact; normal reflexes, sensation intact throughout   PSYCH: Appropriate insight/judgment; A+O x 3, mood and affect appropriate T(C): 36.4 (05-23-23 @ 18:27), Max: 36.6 (05-23-23 @ 15:24)  HR: 91 (05-23-23 @ 18:27) (91 - 99)  BP: 116/85 (05-23-23 @ 18:27) (116/85 - 120/85)  RR: 17 (05-23-23 @ 18:27) (16 - 17)  SpO2: 100% (05-23-23 @ 18:27) (99% - 100%)    CONSTITUTIONAL: Well groomed, no apparent distress  EYES: PERRLA and symmetric, EOMI  ENMT: MMM. Normal dentition  RESP: No respiratory distress, no use of accessory muscles; mild rales at lung base,   CV: +S1S2, RRR, 2+ LE edema b/l, old healed midline sternotomy scar   GI: Soft, NTND, no RGR; no palpable masses  MSK: Normal gait; No digital clubbing or cyanosis; normal pain free ROM x4 extremities   SKIN: No rashes or ulcers noted  NEURO: CN II-XII grossly intact; normal reflexes, sensation intact throughout   PSYCH: Appropriate insight/judgment; A+O x 3, mood and affect appropriate

## 2023-05-23 NOTE — H&P ADULT - PROBLEM SELECTOR PLAN 2
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 C donor (treated) 2/23/18 (post op course complicated by graft dysfunction treated by plasmapheresis, IVIG, and rituximab) currently on immunosuppressant     - C/w sulfamethoxazole-trimethoprim 400mg-80mg qd  - C/w Tacrolimus 0.5mg  5cap BID   - C/w valganciclovir 450mg qd   - Prednisone 60mg qd   - Folic Acid 1mg   - HF transplant consult to be called in AM Hx/o Nonischemic cardiomyopathy, chronic systolic heart failure s/p HM2 LVAD (6/2017), s/p heart transplant from Hep. C donor (treated) 2/23/18 (post op course complicated by graft dysfunction treated by plasmapheresis, IVIG, and rituximab) currently on immunosuppressant     - C/w sulfamethoxazole-trimethoprim 400mg-80mg qd  - C/w Tacrolimus 0.5mg  5cap BID, check Tacrolimus level  - C/w valganciclovir 450mg qd   - Prednisone 40mg qd   - Folic Acid 1mg   - HF transplant consult to be called in AM

## 2023-05-23 NOTE — H&P ADULT - PROBLEM SELECTOR PLAN 4
Dx'd after he was admitted to [facility] on 1/4/23 for hemolytic anemia with hgb 6.5.  Was treated with pulse Decadron and IVIG. He received 1 unit of packed red blood cells and then prednisone was started. He was discharged on 1/7/23. Was in remission with well compensated hemolytic anemia on low dose prednisone, but relapsed after tapered to 3 mg daily.   Was re-started on high dose prednisone and responding currently on Prednisone 60mg qd     - H&H appears stable, will trend cbc   - C/w Prednisone 60mg qd    - heme onc/consult ( emailed) Dx'd after he was admitted to [facility] on 1/4/23 for hemolytic anemia with hgb 6.5.  Was treated with pulse Decadron and IVIG. He received 1 unit of packed red blood cells and then prednisone was started. He was discharged on 1/7/23. Was in remission with well compensated hemolytic anemia on low dose prednisone, but relapsed after tapered to 3 mg daily.   Was re-started on high dose prednisone and responding currently on Prednisone 40mg qd     - H&H appears stable, will trend cbc   - C/w Prednisone 40mg qd    - heme onc/consult ( emailed)

## 2023-05-23 NOTE — HISTORY OF PRESENT ILLNESS
[FreeTextEntry1] : Mr. Baez is a 73 year old man with past medical history of a nonischemic cardiomyopathy and chronic systolic heart failure s/p HM2 LVAD in June 2017 complicated by recurrent GI hemorrhage and possible pump thrombosis who underwent heart transplant on 2/23/18 with a hepatitis C positive donor. His course was complicated by bilateral IJ/subclavian thrombi, a persistent small right sided pleural effusion, as well as acute on chronic renal failure of unclear etiology. In addition, his post-operative course was complicated by graft dysfunction of unclear etiology and persistent C4d positive staining on endomyocardial biopsy with negative DSAs. He developed joshua evidence of graft dysfunction leading to treatment with plasmapheresis, IVIG, and rituximab. Subsequently in June of 2018 he was found to have an pneumonia, that was ultimately diagnosed as Nocardia. This was successfully treated with therapy completed in August 2019.\par \par In the spring 2020, he was admitted with hemolytic anemia of unclear etiology. There were no clear precipitating factors, such as infection. He was treated with prednisone and Rituximab. Given the potential for CNI inhibitors leading to hemolytic anemia, we transitioned him to everolimus and he remained on high dose prednisone. He was subsequently admitted with acute anemia (not hemolytic). He developed severe leukopenia and Klebsiella bacteremia. Following treatment of this, he developed a severe C. diff colitis infection leading to a prolonged recovery. He was weaned to lower dose prednisone and the everolimus was transitioned back to tacrolimus. At the time of discharge he was found to have low levels of CMV viremia and was started back on valganciclovir. 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 Discharged home and had been following closely with heme/onc.\par \par Pt readmitted 3/3-3/18/23 who presented to [facility] ER on with new onset of chest tightness, found to be in Aflutter/fib. Hospital course c/b by URI w/ human metapneumovirus, normal LV function, acute on chronic kidney injury likely 2/2 hypovolemia, worsening leukocytosis with CT chest c/f PNA and hyperglycemia. Heme/onc consulted for hemolytic anemia management and prednisone dosing reduced to 60mg daily. Endocrine consulted for new onset of hyperglycemia and insulin teaching.  Bacterial infection-work up negative to date however had an elevated Fungitell.  Treated with broad spectrum abx from 3/3 to 3/8 (zosyn then cefepime).  Pt was cleared for discharge home on 3/18.\par \par Pt here today for follow up clinic visit in the setting of c/o BL LE edema and started on lasix 40mg daily on 5/19. Since then pt reports he feels less SOB and lost 4 pounds on his scale at home. He continues to have a HHA ~25 hours/week and visiting RN TIW. Denied CP, orthopnea, cough.  He has no fevers, chills, sweats, dysuria, hematuria, or headaches.  Appetite good, and sleeping well.  Per HHA, pt doesn't attend senior center anymore but HHA goes and picks up his food. \par \par Health maintenance:\par DEXA 5/2019, 6/2021: osteopenia, 6/2022: worsening osteopenia. Followed by Dr. Clemens.\par CT Colon (4/2017) IMPRESSION: No colonic polyp or mass.\par PSA 6.76 (4/29/2022)\par HgA1c 4.7% (2/17/23) --> 7.3 % (5/18/23)

## 2023-05-23 NOTE — H&P ADULT - PROBLEM SELECTOR PLAN 1
Pt  w/significant cardiac hx presents w/ worsening HF sx ( SOB, LE edema, wt gain)  In HF office today noted to have near syncopal episode so sent into ED for further management    - EKG: NSR w/RBBB  - cbc & chem stable, Trop neg,   - CXR: Rt basilar atelectasis, trace b/l pleural effusion   - ED: given IV lasix 40mg x1  - c/w IV lasix  40mg qd   - I&O, daily wt   - telemetry   - Echo from 5/19/23 reviewed: LVSF mildly decreased, EF 51%  - Cardio consult to be called in AM   - C/w home meds: Metoroprolol suc ER 200mg , Losartan 25mg, Statin  - Had near syncopal episode in cardio office, CTH reviewed and neg Pt  w/significant cardiac hx presents w/ worsening HF sx ( SOB, LE edema, wt gain)  In HF office today noted to have near syncopal episode so sent into ED for further management    - EKG: NSR w/RBBB  - cbc & chem stable, Trop neg,   - CXR: Rt basilar atelectasis, trace b/l pleural effusion   - ED: given IV lasix 40mg x1  - c/w IV lasix  40mg qd   - I&O, daily wt   - telemetry   - Echo from 5/19/23 reviewed: LVSF mildly decreased, EF 51%  - Cardio/HF consult to be called in AM , follows Dr Campbell   - C/w home meds: Metoroprolol suc ER 200mg , Losartan 25mg, Statin  - Had near syncopal episode in cardio office, CT reviewed and neg

## 2023-05-23 NOTE — H&P ADULT - PROBLEM SELECTOR PLAN 3
S/p orthotopic heart transplant from Hep. C donor (treated) 2/23/18 (post op course complicated by graft dysfunction treated by plasmapheresis, IVIG, and rituximab) currently on immunosuppressant     - Metoroprolol suc ER 200mg, Pravastatin 20mg, ASA 81mg   - c/w  Losartan 25mg for now as renal function appears improved to prior, if Cr worsen will hold   - c/w home pantoprazole 40 mg PO QD    - underwent annual RHC/EMBx with Dr. Carpenter in February 2023

## 2023-05-23 NOTE — ED ADULT NURSE REASSESSMENT NOTE - NS ED NURSE REASSESS COMMENT FT1
Report received from DIXON Salcido. Pt a&ox4, in no acute distress at this time. Patient safety and comfort measures maintained.

## 2023-05-23 NOTE — ED ADULT NURSE NOTE - NSFALLRISKINTERV_ED_ALL_ED

## 2023-05-23 NOTE — H&P ADULT - PROBLEM SELECTOR PLAN 5
On Prednisone 60 mg qd   - c/w Humalog 26 U (BF), 10U (lunch), 6U (dinner)  - Lantus 18U qHS  - Endo consult (emailed) for further insulin management

## 2023-05-23 NOTE — CARDIOLOGY SUMMARY
[de-identified] : \par EKG 4/3/23: NSR 91bpm +RBBB\par EKG 12/28/22: NSR 95 bpm +RBBB [de-identified] : DSE 12/22/2020: Conclusions:\par 1. Normal hemodynamic response.\par 2. Normal electrocardiographic response.\par 3. Overall preserved left ventricular systolic function\par with mild hypokinesis of the mid to distal anterior wall at\par baseline. Normal augmentation in left ventricular systolic\par function with dobutamine infusion.\par 4. No evidence of inducible ischemia on stress\par echocardiogram images.\par *** Compared with echocardiogram of 12/2/2020, overall\par preserved left ventricular systolic function at baseline\par with mild hypokiesis of the mid to distal anterior wall.\par Normal augmentation in left ventricular systolic function\par with dobutamine infusion.\par \par 2/20/20: DSE EF 50-55% no evidence of inducible ischemia on pharmacologic stress echo images [de-identified] : \par TTE 3/3/23: LVIDd 3.6, EF 50-55%, concentric remodeling \par TTE 7/25/22: EF 60%, LVIDD 4.5cm, normal LV function, normal RV size with decreased RV function, mild TR, no pericardial effusion\par \par TTE 2/28/22: EF 65% LVIDD not calculated. normal biV function. 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 minimal TR. no pericardial effusion. [de-identified] : \par CT chest/abd/pelvis 3/14/23: IMPRESSION:\par Chest: Improving aeration of previously impacted right lower lobe distal  airways with improving bibasilar atelectasis. Additionally, there is a \par small clustered area of unchanged nodular opacities which may represent \par superimposed infection or inflammation. 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  No additional coronary-cameral fistulae noted. 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 IVUS performed of the RCA\par which was normal. Normal RHC\par \par 12/2/2020: RHC/Biopsy ISHLT Grade 0R\par 11/18/2020:  L/RHC w/ IVUS:\par CORONARY VESSELS: The coronary circulation is right dominant.\par LM:   --  LM: The vessel was very large sized. Angiography showed\par aneurysmal dilatation.\par LAD:   --  Proximal LAD: The vessel was very large sized. Angiography\par showed aneurysmal dilatation.\par --  Mid LAD: The vessel was very large sized and excessively ectatic. A\par fistula to the left ventricle was identified.\par --  Distal LAD: Normal. 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 No significant intimal thickening/hyperplasia or\par plaque was observed. [de-identified] : 3/15/23: b/l LE duplex\par IMPRESSION: There is acute below the knee DVT affecting the right soleal and the left soleal and gastrocnemius veins.

## 2023-05-23 NOTE — ED PROVIDER NOTE - ATTENDING APP SHARED VISIT CONTRIBUTION OF CARE
RGUJRAL 73-year-old male history of nonischemic cardiomyopathy systolic heart failure status post heart transplant in 2018 with recent treatment for hemolytic anemia in 2020 today sent in by heart transplant team for weakness, near syncope and volume overload.  Patient states he has had an episode at home once and today at the office while seeing Dr. Rain where he was caught when he became lightheaded.  Patient did not sustain any trauma.  Patient denies any cough URI fevers or chills.  Patient has had an increase of diuretics at home for weight gain and volume overload but today sent in for IV diuresis and admission to the hospital for fall risk as well.  Patient denies any headache chest pain shortness of breath.  On exam patient is well-appearing mildly tachypneic on room air.  Lungs with bibasilar Rales.  Abdomen soft nontender.  Bilateral lower extremity with 1-2+ edema.  Case discussed with heart transplant team Navya recommends admission to the hospitalist with IV diuresis.

## 2023-05-23 NOTE — ED PROVIDER NOTE - RAPID ASSESSMENT
73y M w/ pmhx of heart transplant (2018), sent in for admission by MD for "volume overload s/p mechanical fail frailty". Pt has been endorsing sob and dizziness x2days and fell today. Denies sob.     Gina GUDINO (Katty) have documented this rapid assessment note under the dictation of Kiki Santillan) which has been reviewed and affirmed to be accurate. Patient was seen as a QDOC patient. The patient will be seen and further worked up in the main emergency department and their care will be completed by the main emergency department team along with a thorough physical exam. Receiving team will follow up on labs, analgesia, any clinical imaging, reassess and disposition as clinically indicated, all decisions regarding the progression of care will be made at their discretion. 73y M w/ pmhx of heart transplant (2018), sent in for admission by MD for "volume overload s/p mechanical fail frailty". Pt has been endorsing sob and dizziness x2days and fell today. Denies sob. Patient on ASA.       IGina (Scribe) have documented this rapid assessment note under the dictation of Kiki Santillan (MD) which has been reviewed and affirmed to be accurate. Patient was seen as a QDOC patient. The patient will be seen and further worked up in the main emergency department and their care will be completed by the main emergency department team along with a thorough physical exam. Receiving team will follow up on labs, analgesia, any clinical imaging, reassess and disposition as clinically indicated, all decisions regarding the progression of care will be made at their discretion.    Kiki Santillan MD, Attending:  The scribe’s documentation has been prepared under my direction and personally reviewed by me.     Patient was rapidly assessed; a limited history and physical exam was performed. The patient will be seen and further worked up in the main emergency department and their care will be completed by the main emergency department team. Receiving team will follow up on labs, analgesia, any clinical imaging, and perform reassessment and disposition of the patient as clinically indicated.  All decisions regarding the progression of care will be made at their discretion.

## 2023-05-23 NOTE — DISCUSSION/SUMMARY
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 \par hemolytic anemia/recent thrombocytopenia. Admission in feb for recurrent hem anemia. d/c on pred 75mg. Follow by Dr Rosario. Current dose of pred 30. Plans are for initiation of ritoxan because of persistent anemia (not yet started). \par intolerant of cellcept due leukopenia. 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 everolimus d/c for that reason. \par Annual Feb 2023: no obstructive CAD. 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 \par Last Bx Feb 2022 OR.  C4D 50%. +1 staining.No histopath features of AMR. No Hx  DSAs. \par last allosure 5.17: < .12 \par Baseline renal function prior to most recent admission 1.4. \par Admitted: March 3-18. viral pneumonia. metapneumovirus. Afib flutter. self converted. \par Seen last week for initation ritoxan. found to be volume overloaded. 10lbs overweight. \par TTE showed mild reduction in LV function (echo in march and jan hyperdynamic). \par initiated on lasix 40 mg QD. 7lb weight loss. but remains edematous. \par walking improved. some SOBOE. no orthopnea. \par Of note patient has glucose monitor since 5.18 \par did not take medications today- no reason. initial vitals fine. when patient was stood to be weighed, fell to ground. "knees buckled". never hypotensive. "has not eaten all day" \par 129/87, standing 114/ 81, 110/min 180 (187) (april 170) \par lungs clear. abdo distended. LE ++. \par meds: toprol 200, losartan 25 QD, prograf 2./2. bid (4.9 late, 5.4,), pred 30, bactrim SS, valctye 450 QD, nystatin,  PPI, asa, insulin, valtessa, prava 20. eloquis 5 bid lasix 40 QD\par TTE 5.19: LVEDD 4.5, LVEF 51, hypo global. normal RV, mild RVE, RV apical turbulence (fisutla) \par 5.16: WBC 11, Hb 10, PLt 187, Na 139, K 5.0 Cl 106, Bi 20, G 207, BUN 34, Cr 1.4 \par Patient has multiple active medical issues and there are concerns by the team about inadequate supports at home. \par Unexplained falls. volume overload, new mild LV dysfunction. \par Plan: \par admit. stat labs. \par IV diuretics. \par repeat allosure vs cardiac bx later this week. \par social evaluation and discussion with transplant coordinators re safety of existing home supports. \par Marvin Campbell

## 2023-05-24 ENCOUNTER — NON-APPOINTMENT (OUTPATIENT)
Age: 73
End: 2023-05-24

## 2023-05-24 DIAGNOSIS — E11.9 TYPE 2 DIABETES MELLITUS WITHOUT COMPLICATIONS: ICD-10-CM

## 2023-05-24 DIAGNOSIS — I10 ESSENTIAL (PRIMARY) HYPERTENSION: ICD-10-CM

## 2023-05-24 DIAGNOSIS — I48.91 UNSPECIFIED ATRIAL FIBRILLATION: ICD-10-CM

## 2023-05-24 LAB
A1C WITH ESTIMATED AVERAGE GLUCOSE RESULT: 8.6 % — HIGH (ref 4–5.6)
ANION GAP SERPL CALC-SCNC: 13 MMOL/L — SIGNIFICANT CHANGE UP (ref 5–17)
ANION GAP SERPL CALC-SCNC: 15 MMOL/L — SIGNIFICANT CHANGE UP (ref 5–17)
B-OH-BUTYR SERPL-SCNC: 0 MMOL/L — SIGNIFICANT CHANGE UP
BUN SERPL-MCNC: 37 MG/DL — HIGH (ref 7–23)
BUN SERPL-MCNC: 39 MG/DL — HIGH (ref 7–23)
CALCIUM SERPL-MCNC: 8.4 MG/DL — SIGNIFICANT CHANGE UP (ref 8.4–10.5)
CALCIUM SERPL-MCNC: 8.9 MG/DL — SIGNIFICANT CHANGE UP (ref 8.4–10.5)
CHLORIDE SERPL-SCNC: 97 MMOL/L — SIGNIFICANT CHANGE UP (ref 96–108)
CHLORIDE SERPL-SCNC: 99 MMOL/L — SIGNIFICANT CHANGE UP (ref 96–108)
CO2 SERPL-SCNC: 26 MMOL/L — SIGNIFICANT CHANGE UP (ref 22–31)
CO2 SERPL-SCNC: 27 MMOL/L — SIGNIFICANT CHANGE UP (ref 22–31)
CREAT SERPL-MCNC: 1.78 MG/DL — HIGH (ref 0.5–1.3)
CREAT SERPL-MCNC: 1.82 MG/DL — HIGH (ref 0.5–1.3)
EGFR: 39 ML/MIN/1.73M2 — LOW
EGFR: 40 ML/MIN/1.73M2 — LOW
ESTIMATED AVERAGE GLUCOSE: 200 MG/DL — HIGH (ref 68–114)
GAS PNL BLDV: SIGNIFICANT CHANGE UP
GLUCOSE BLDC GLUCOMTR-MCNC: 180 MG/DL — HIGH (ref 70–99)
GLUCOSE BLDC GLUCOMTR-MCNC: 184 MG/DL — HIGH (ref 70–99)
GLUCOSE BLDC GLUCOMTR-MCNC: 231 MG/DL — HIGH (ref 70–99)
GLUCOSE BLDC GLUCOMTR-MCNC: 248 MG/DL — HIGH (ref 70–99)
GLUCOSE BLDC GLUCOMTR-MCNC: 291 MG/DL — HIGH (ref 70–99)
GLUCOSE BLDC GLUCOMTR-MCNC: 433 MG/DL — HIGH (ref 70–99)
GLUCOSE BLDC GLUCOMTR-MCNC: 463 MG/DL — CRITICAL HIGH (ref 70–99)
GLUCOSE BLDC GLUCOMTR-MCNC: 468 MG/DL — CRITICAL HIGH (ref 70–99)
GLUCOSE BLDC GLUCOMTR-MCNC: 479 MG/DL — CRITICAL HIGH (ref 70–99)
GLUCOSE BLDC GLUCOMTR-MCNC: 487 MG/DL — CRITICAL HIGH (ref 70–99)
GLUCOSE BLDC GLUCOMTR-MCNC: 512 MG/DL — CRITICAL HIGH (ref 70–99)
GLUCOSE BLDC GLUCOMTR-MCNC: 545 MG/DL — CRITICAL HIGH (ref 70–99)
GLUCOSE SERPL-MCNC: 299 MG/DL — HIGH (ref 70–99)
GLUCOSE SERPL-MCNC: 413 MG/DL — HIGH (ref 70–99)
HCT VFR BLD CALC: 34.7 % — LOW (ref 39–50)
HGB BLD-MCNC: 10.8 G/DL — LOW (ref 13–17)
MAGNESIUM SERPL-MCNC: 1.9 MG/DL — SIGNIFICANT CHANGE UP (ref 1.6–2.6)
MAGNESIUM SERPL-MCNC: 2 MG/DL — SIGNIFICANT CHANGE UP (ref 1.6–2.6)
MCHC RBC-ENTMCNC: 31.1 GM/DL — LOW (ref 32–36)
MCHC RBC-ENTMCNC: 32.6 PG — SIGNIFICANT CHANGE UP (ref 27–34)
MCV RBC AUTO: 104.8 FL — HIGH (ref 80–100)
NRBC # BLD: 0 /100 WBCS — SIGNIFICANT CHANGE UP (ref 0–0)
PLATELET # BLD AUTO: 190 K/UL — SIGNIFICANT CHANGE UP (ref 150–400)
POTASSIUM SERPL-MCNC: 5.1 MMOL/L — SIGNIFICANT CHANGE UP (ref 3.5–5.3)
POTASSIUM SERPL-MCNC: 5.8 MMOL/L — HIGH (ref 3.5–5.3)
POTASSIUM SERPL-SCNC: 5.1 MMOL/L — SIGNIFICANT CHANGE UP (ref 3.5–5.3)
POTASSIUM SERPL-SCNC: 5.8 MMOL/L — HIGH (ref 3.5–5.3)
RBC # BLD: 3.31 M/UL — LOW (ref 4.2–5.8)
RBC # FLD: 16.7 % — HIGH (ref 10.3–14.5)
SODIUM SERPL-SCNC: 136 MMOL/L — SIGNIFICANT CHANGE UP (ref 135–145)
SODIUM SERPL-SCNC: 141 MMOL/L — SIGNIFICANT CHANGE UP (ref 135–145)
TACROLIMUS SERPL-MCNC: 4.2 NG/ML — SIGNIFICANT CHANGE UP
TACROLIMUS SERPL-MCNC: 6.9 NG/ML — SIGNIFICANT CHANGE UP
WBC # BLD: 12.69 K/UL — HIGH (ref 3.8–10.5)
WBC # FLD AUTO: 12.69 K/UL — HIGH (ref 3.8–10.5)

## 2023-05-24 PROCEDURE — 99223 1ST HOSP IP/OBS HIGH 75: CPT

## 2023-05-24 PROCEDURE — 99232 SBSQ HOSP IP/OBS MODERATE 35: CPT

## 2023-05-24 RX ORDER — INSULIN GLARGINE 100 [IU]/ML
20 INJECTION, SOLUTION SUBCUTANEOUS AT BEDTIME
Refills: 0 | Status: DISCONTINUED | OUTPATIENT
Start: 2023-05-24 | End: 2023-05-24

## 2023-05-24 RX ORDER — FUROSEMIDE 40 MG
40 TABLET ORAL
Refills: 0 | Status: DISCONTINUED | OUTPATIENT
Start: 2023-05-24 | End: 2023-05-26

## 2023-05-24 RX ORDER — INSULIN HUMAN 100 [IU]/ML
3 INJECTION, SOLUTION SUBCUTANEOUS ONCE
Refills: 0 | Status: DISCONTINUED | OUTPATIENT
Start: 2023-05-24 | End: 2023-05-28

## 2023-05-24 RX ORDER — INSULIN GLARGINE 100 [IU]/ML
18 INJECTION, SOLUTION SUBCUTANEOUS AT BEDTIME
Refills: 0 | Status: DISCONTINUED | OUTPATIENT
Start: 2023-05-24 | End: 2023-05-26

## 2023-05-24 RX ORDER — INSULIN HUMAN 100 [IU]/ML
5 INJECTION, SOLUTION SUBCUTANEOUS ONCE
Refills: 0 | Status: COMPLETED | OUTPATIENT
Start: 2023-05-24 | End: 2023-05-24

## 2023-05-24 RX ADMIN — Medication 81 MILLIGRAM(S): at 12:08

## 2023-05-24 RX ADMIN — FEBUXOSTAT 40 MILLIGRAM(S): 40 TABLET ORAL at 12:08

## 2023-05-24 RX ADMIN — Medication 500000 UNIT(S): at 06:05

## 2023-05-24 RX ADMIN — Medication 1 MILLIGRAM(S): at 12:08

## 2023-05-24 RX ADMIN — Medication 500000 UNIT(S): at 16:36

## 2023-05-24 RX ADMIN — INSULIN HUMAN 5 UNIT(S): 100 INJECTION, SOLUTION SUBCUTANEOUS at 03:37

## 2023-05-24 RX ADMIN — Medication 10 UNIT(S): at 12:06

## 2023-05-24 RX ADMIN — Medication 40 MILLIGRAM(S): at 16:39

## 2023-05-24 RX ADMIN — Medication 2: at 12:07

## 2023-05-24 RX ADMIN — INSULIN GLARGINE 18 UNIT(S): 100 INJECTION, SOLUTION SUBCUTANEOUS at 22:21

## 2023-05-24 RX ADMIN — Medication 6 UNIT(S): at 17:59

## 2023-05-24 RX ADMIN — TACROLIMUS 2.5 MILLIGRAM(S): 5 CAPSULE ORAL at 16:36

## 2023-05-24 RX ADMIN — ATORVASTATIN CALCIUM 10 MILLIGRAM(S): 80 TABLET, FILM COATED ORAL at 22:24

## 2023-05-24 RX ADMIN — Medication 40 MILLIGRAM(S): at 05:47

## 2023-05-24 RX ADMIN — Medication 26 UNIT(S): at 07:54

## 2023-05-24 RX ADMIN — Medication 40 MILLIGRAM(S): at 00:30

## 2023-05-24 RX ADMIN — TACROLIMUS 2.5 MILLIGRAM(S): 5 CAPSULE ORAL at 05:48

## 2023-05-24 RX ADMIN — SENNA PLUS 2 TABLET(S): 8.6 TABLET ORAL at 22:20

## 2023-05-24 RX ADMIN — Medication 1: at 07:55

## 2023-05-24 RX ADMIN — APIXABAN 5 MILLIGRAM(S): 2.5 TABLET, FILM COATED ORAL at 05:48

## 2023-05-24 RX ADMIN — VALGANCICLOVIR 450 MILLIGRAM(S): 450 TABLET, FILM COATED ORAL at 12:09

## 2023-05-24 RX ADMIN — INSULIN GLARGINE 18 UNIT(S): 100 INJECTION, SOLUTION SUBCUTANEOUS at 00:32

## 2023-05-24 RX ADMIN — Medication 30 MILLIGRAM(S): at 18:05

## 2023-05-24 RX ADMIN — Medication 2: at 18:00

## 2023-05-24 RX ADMIN — LOSARTAN POTASSIUM 25 MILLIGRAM(S): 100 TABLET, FILM COATED ORAL at 05:48

## 2023-05-24 RX ADMIN — APIXABAN 5 MILLIGRAM(S): 2.5 TABLET, FILM COATED ORAL at 16:38

## 2023-05-24 RX ADMIN — Medication 1 TABLET(S): at 22:21

## 2023-05-24 RX ADMIN — Medication 500000 UNIT(S): at 22:21

## 2023-05-24 RX ADMIN — Medication 4: at 00:35

## 2023-05-24 RX ADMIN — Medication 200 MILLIGRAM(S): at 06:05

## 2023-05-24 NOTE — CONSULT NOTE ADULT - PROBLEM SELECTOR RECOMMENDATION 9
-Exam with volume overload and TTE with lower EF  -Diurese with LAsix 40 IV BID  -Transplant coordinators will send Allosure and will plan for cardiac biopsy and RHC this week  -monitor creatinine and electrolytes  -strict I and O  -daily standing weights

## 2023-05-24 NOTE — PROGRESS NOTE ADULT - PROBLEM SELECTOR PLAN 5
- fs improving  - fs achs  - c/w Humalog 26 U (BF), 10U (lunch), 6U (dinner) and Lantus 18U qHS  - On Prednisone 60 mg qd   - Endo consult pending

## 2023-05-24 NOTE — CONSULT NOTE ADULT - SUBJECTIVE AND OBJECTIVE BOX
Hematology/Oncology Consult Note    HPI:  73 year old man PMH  HTN, HLD, Nonischemic cardiomyopathy, chronic systolic heart failure s/p HM2 LVAD (6/2017), s/p heart transplant from Hep. C donor (treated) 2/23/18 (post op course complicated by graft dysfunction treated by plasmapheresis, IVIG, and rituximab), being sent from HF clinic for worsening sx. Has been experiencing worsening SOB, increased LE edema, weakness and near syncopal episode. Pt has experienced wt gain and have been requiring increasing doses of diuretics at home. He had near syncopal episode while at the HF clinic today in presence of Raymond Beebe no fall, injury, head trauma, LOC. Given worsening sx, was sent for further management and Iv diuretics     Denies CP, palpitation, N/V/D, fever, cough, chills, dizziness, abm pain        (23 May 2023 20:36)      Allergies    No Known Allergies    Intolerances        MEDICATIONS  (STANDING):  apixaban 5 milliGRAM(s) Oral every 12 hours  aspirin  chewable 81 milliGRAM(s) Oral daily  atorvastatin 10 milliGRAM(s) Oral at bedtime  dextrose 5%. 1000 milliLiter(s) (50 mL/Hr) IV Continuous <Continuous>  dextrose 5%. 1000 milliLiter(s) (100 mL/Hr) IV Continuous <Continuous>  dextrose 50% Injectable 25 Gram(s) IV Push once  dextrose 50% Injectable 12.5 Gram(s) IV Push once  dextrose 50% Injectable 25 Gram(s) IV Push once  febuxostat 40 milliGRAM(s) Oral daily  folic acid 1 milliGRAM(s) Oral daily  furosemide   Injectable 40 milliGRAM(s) IV Push two times a day  glucagon  Injectable 1 milliGRAM(s) IntraMuscular once  insulin glargine Injectable (LANTUS) 18 Unit(s) SubCutaneous at bedtime  insulin lispro (ADMELOG) corrective regimen sliding scale   SubCutaneous three times a day before meals  insulin lispro (ADMELOG) corrective regimen sliding scale   SubCutaneous at bedtime  insulin lispro Injectable (ADMELOG) 26 Unit(s) SubCutaneous before breakfast  insulin lispro Injectable (ADMELOG) 10 Unit(s) SubCutaneous before lunch  insulin lispro Injectable (ADMELOG) 6 Unit(s) SubCutaneous before dinner  insulin regular  human recombinant. 3 Unit(s) SubCutaneous once  metoprolol succinate  milliGRAM(s) Oral daily  nystatin    Suspension 314805 Unit(s) Oral three times a day  pantoprazole    Tablet 40 milliGRAM(s) Oral before breakfast  predniSONE   Tablet 30 milliGRAM(s) Oral daily  senna 2 Tablet(s) Oral at bedtime  tacrolimus 2.5 milliGRAM(s) Oral two times a day  trimethoprim   80 mG/sulfamethoxazole 400 mG 1 Tablet(s) Oral every 24 hours  valGANciclovir 450 milliGRAM(s) Oral daily    MEDICATIONS  (PRN):  acetaminophen     Tablet .. 650 milliGRAM(s) Oral every 6 hours PRN Temp greater or equal to 38C (100.4F), Mild Pain (1 - 3)  aluminum hydroxide/magnesium hydroxide/simethicone Suspension 30 milliLiter(s) Oral every 4 hours PRN Dyspepsia  dextrose Oral Gel 15 Gram(s) Oral once PRN Blood Glucose LESS THAN 70 milliGRAM(s)/deciliter  melatonin 3 milliGRAM(s) Oral at bedtime PRN Insomnia  ondansetron Injectable 4 milliGRAM(s) IV Push every 8 hours PRN Nausea and/or Vomiting      PAST MEDICAL & SURGICAL HISTORY:  HTN      SVT (Supraventricular Tachycardia)      Non-Ischemic Cardiomyopathy  now s/p transplant 2018      GIB (gastrointestinal bleeding)      Hepatitis C virus      DVT of upper extremity (deep vein thrombosis)      Former smoker      HLD (hyperlipidemia)      Knee pain, right      H/O autoimmune hemolytic anemia      H/O hemolytic anemia      Status post left hip replacement      H/O heart transplant  2/2018          FAMILY HISTORY:      SOCIAL HISTORY: No EtOH, no tobacco    REVIEW OF SYSTEMS:    CONSTITUTIONAL: No weakness, fevers or chills  EYES/ENT: No visual changes;  No vertigo or throat pain   NECK: No pain or stiffness  RESPIRATORY: No cough, wheezing, hemoptysis; No shortness of breath  CARDIOVASCULAR: No chest pain or palpitations  GASTROINTESTINAL: No abdominal or epigastric pain. No nausea, vomiting, or hematemesis; No diarrhea or constipation. No melena or hematochezia.  GENITOURINARY: No dysuria, frequency or hematuria  NEUROLOGICAL: No numbness or weakness  SKIN: No itching, burning, rashes, or lesions   All other review of systems is negative unless indicated above.        T(F): 98 (05-24-23 @ 15:01), Max: 98 (05-24-23 @ 15:01)  HR: 98 (05-24-23 @ 15:01) (91 - 106)  BP: 110/80 (05-24-23 @ 15:01)  RR: 18 (05-24-23 @ 15:01)  SpO2: 99% (05-24-23 @ 15:01) (97% - 100%)    Physical Exam  GENERAL: NAD, well-developed  HEAD:  Atraumatic, Normocephalic  EYES: EOMI, PERRLA, conjunctiva and sclera clear  NECK: Supple, No JVD  CHEST/LUNG: Clear to auscultation bilaterally; No wheeze  HEART: Regular rate and rhythm; No murmurs, rubs, or gallops  ABDOMEN: Soft, Nontender, Nondistended; Bowel sounds present  EXTREMITIES:  2+ Peripheral Pulses, No clubbing, cyanosis, or edema  NEUROLOGY: non-focal  SKIN: No rashes or lesions                          10.8   12.69 )-----------( 190      ( 24 May 2023 06:00 )             34.7       05-24    141  |  99  |  37<H>  ----------------------------<  299<H>  5.8<H>   |  27  |  1.82<H>    Ca    8.9      24 May 2023 06:00  Mg     2.0     05-24    TPro  5.7<L>  /  Alb  3.9  /  TBili  0.7  /  DBili  x   /  AST  16  /  ALT  25  /  AlkPhos  61  05-23      Magnesium, Serum: 2.0 mg/dL (05-24 @ 06:00)  Magnesium, Serum: 1.9 mg/dL (05-24 @ 04:41)          Imaging:  < from: CT Head No Cont (05.23.23 @ 20:32) >    IMPRESSION:    No evidence of an acute hemorrhage, extra-axial fluid collection or   midline shift.    < end of copied text >

## 2023-05-24 NOTE — PHYSICAL THERAPY INITIAL EVALUATION ADULT - ADDITIONAL COMMENTS
lives with brother in house with 3 steps to enter +HR, 1st floor set up. uses cane primarily, has RW at home. independent at baseline.

## 2023-05-24 NOTE — CONSULT NOTE ADULT - PROBLEM SELECTOR RECOMMENDATION 6
-CMV: valgancyclovir 450 daily  -Toxo/PCP: bactrim 400-80 POD  -CAV: pravastatin 20mg daily, ASA 81mg   -Fungal: nystatin

## 2023-05-24 NOTE — CONSULT NOTE ADULT - ASSESSMENT
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  Course c/b by URI w/ human metapneumovirus c/f PNA. He presents from clinic due to leg edema and two mechanical falls. He is volume overloaded on exam and recent echo shows Ef decrease from 72 to 5    Cardiac studies:  TTE: 5/19: LVIDd 4.1, EF 51%" Compared to the transthoracic echocardiogram performed on 1/5/2023 There is a decrease in LVEF. On the previous study, the LV was hyperdynamic. There is more turbulence seen in the RV compared to the previous. RV size is mildly dilated but function appears similar.  TTE 3/3/23: LVIDd 3.6, EF 50-55%, concentric remodeling

## 2023-05-24 NOTE — PROGRESS NOTE ADULT - PROBLEM SELECTOR PLAN 2
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 C donor (treated) 2/23/18 (post op course complicated by graft dysfunction treated by plasmapheresis, IVIG, and rituximab) currently on immunosuppressant   - C/w sulfamethoxazole-trimethoprim 400mg-80mg qd  - C/w Tacrolimus 0.5mg  5cap BID, check Tacrolimus level  - C/w valganciclovir 450mg qd   - Prednisone 40mg qd   - Folic Acid 1mg

## 2023-05-24 NOTE — PHYSICAL THERAPY INITIAL EVALUATION ADULT - TRANSFER TRAINING, PT EVAL
GOAL: Patient will perform sit to stand transfers independently with LRAD, with proper hand placement

## 2023-05-24 NOTE — PHYSICAL THERAPY INITIAL EVALUATION ADULT - PLANNED THERAPY INTERVENTIONS, PT EVAL
GOAL: Pt will negotiate up/down 3 steps with one handrail ascending independently in 2 weeks/balance training/bed mobility training/gait training/strengthening/transfer training

## 2023-05-24 NOTE — CONSULT NOTE ADULT - ATTENDING COMMENTS
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     #Nicole syndrome: Diagnosed after he was admitted to [facility] on 1/4/23 for hemolytic anemia with Hgb 6.5. He was treated with pulse dose dexamethasone and IVIG. He received 1 unit of pRBCs and prednisone. He was recently tapered on prednisone from 40 mg to 30 mg daily per Dr. Rosario.   - Trend CBC with differential daily. Continue supportive transfusions to maintain Hgb > 7 and plt > 10.   - Trend hemolysis markers daily: LDH, reticulocyte count  - Continue prednisone 40 mg daily. Will plan to discharge home on this dose, with further tapering per Dr. Rosario.  - PPI for GI prophylaxis  - Bactrim for PCP prophylaxis  - Follow up with Dr. Rosario when stable for discharge.
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 Will obtain PT evaluation, possibly Neuro consult.  For heart transplant, he appears volume overloaded. Diurese with IV diuretics. Will check AlloSure (cell free DNA) to assess for rejection, and low threshold for endomyocardial biopsy. Continue Tacrolimus. Continue ASA, statin.   Hemolytic anemia--continue Prednisone. Plan for Rituxan, will consult Hematology. Continue prophylaxis with Bactrim, Valcyte, Nystatin.

## 2023-05-24 NOTE — PROGRESS NOTE ADULT - NSPROGADDITIONALINFOA_GEN_ALL_CORE
disposition: remains on iv diuresis, chf consult pending  discussed with acp    Wanda Silva D.O.  available on MS teams

## 2023-05-24 NOTE — CONSULT NOTE ADULT - ASSESSMENT
73 year old man PMH  HTN, HLD, Nonischemic cardiomyopathy, chronic systolic heart failure s/p HM2 LVAD (6/2017), s/p heart transplant from Hep. C donor (treated) 2/23/18 (post op course complicated by graft dysfunction treated by plasmapheresis, IVIG, and rituximab), being sent from HF clinic for worsening symptoms. 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    #Grayson's syndrome  -Diagnosed after he was admitted to [facility] on 1/4/23 for hemolytic anemia with hgb 6.5. He was treated with Pulse Decadron and IVIG. He received 1 unit of packed red blood cells and then prednisone was started. He was discharged on 1/7/23. Was in remission with well compensated hemolytic anemia on low dose prednisone, but relapsed after tapered to 3 mg daily.   -he was seen by Dr. Rosario several weeks ago, plan was for prednisone 30mg  -c/w prednisone 30mg at discharge  -CTH negative  -Plts and anemia at baseline  -no role for rituximab  -Discussed case with Dr. 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  -Follow up with Dr. Rosario at [facility] when stable for discharge    Please page with questions or concerns. Will sign off for now.      Taye Mckeon M.D.  Hematology/Oncology Fellow PGY5  Pager [phone]  After 5pm, please contact on-call team.

## 2023-05-24 NOTE — PROGRESS NOTE ADULT - PROBLEM SELECTOR PLAN 1
- c/w IV lasix  40mg qd   - I&O, daily wt   - c/w telemetry   - Echo from 5/19/23 reviewed: LVSF mildly decreased, EF 51%  - C/w home meds: Metoroprolol suc ER 200mg , Losartan 25mg, Statin  - CHF team consulted, patient of dr alvarez

## 2023-05-24 NOTE — CONSULT NOTE ADULT - PROBLEM SELECTOR RECOMMENDATION 8
-autoimmune hemolysis  -monitor CBC  -continue home pred of 30mg -autoimmune hemolysis  -monitor CBC  -continue home pred of 30mg  -plans to reinitiate ritoxan with hem/onc Dr. Rosario. -autoimmune hemolysis  -monitor CBC  -continue home pred of 30mg  -plans to reinitiate rituxan with hem/onc Dr. Rosario.

## 2023-05-24 NOTE — ED ADULT NURSE REASSESSMENT NOTE - NS ED NURSE REASSESS COMMENT FT1
[facility] contacted regarding pt FS greater than 400, both [facility] and [facility] states they are not part of their care and will not accept critical value. Hospitalist paged at this time

## 2023-05-24 NOTE — CONSULT NOTE ADULT - PROBLEM SELECTOR RECOMMENDATION 4
-5 years out, transplant on 2/23/2018, from HM2 VAD due to pump thrombosis  -stable coronary cameral fistula  -see immunosuppression below  -RHC/EMbx this week -5 years out, transplant on 2/23/2018, from HM2 VAD due to pump thrombosis  -stable coronary cameral fistula  -see immunosuppression below  -RHC/EMbx this week  -Need PT for increased support at home -5 years out, transplant on 2/23/2018, from HM2 VAD due to pump thrombosis  -stable coronary cameral fistula  -see immunosuppression below  -Need PT for increased support at home

## 2023-05-24 NOTE — PROGRESS NOTE ADULT - PROBLEM SELECTOR PROBLEM 7
Telephone Encounter by Pretty Arriaga at 03/09/18 09:40 AM     Author:  Pretty Arriaga Service:  (none) Author Type:      Filed:  03/09/18 09:41 AM Encounter Date:  3/8/2018 Status:  Signed     :  Pretty Arriaga ()            Patient's son Destiny Herrera contacted to schedule patient's appointment in podiatry.   Call transferred to  to assist in scheduling this appointment.[AC1.1M]      Revision History        User Key Date/Time User Provider Type Action    > AC1.1 03/09/18 09:41 AM Adalid Beltre  Sign    M - Manual HTN (hypertension)

## 2023-05-24 NOTE — CONSULT NOTE ADULT - SUBJECTIVE AND OBJECTIVE BOX
Reason For Consult:     HPI:  73y M PMH  HTN, HLD, Nonischemic cardiomyopathy, chronic systolic heart failure s/p HM2 LVAD (6/2017), s/p heart transplant from Hep. C donor (treated) 2/23/18 (post op course complicated by graft dysfunction treated by plasmapheresis, IVIG, and rituximab), being sent from HF clinic for worsening sx. Has been experiencing worsening SOB, increased LE edema, weakness and near syncopal episode. Pt has experienced wt gain and have been requiring increasing doses of diuretics at home. He had near syncopal episode while at the HF clinic today in presence of Raymond Beebe no fall, injury, head trauma, LOC. Given worsening sx, was sent for further management and Iv diuretics     Denies CP, palpitation, N/V/D, fever, cough, chills, dizziness, abm pain       73y M PMH HTN, HLD, Nonischemic cardiomyopathy, chronic systolic heart failure s/p HM2 LVAD, s/p heart transplant, hx/o Grayson's Syndrome on high dose steroid consult for insulin management iso steroid induced hyperglycemia.  He was seen by the Endo during previous admission in 3/2023.      ENDOCRINE HISTORY   Diagnosed with DM: diabetes after transplant 2018, patient denies history of diabetes  Endocrinologist: does not have  Home DM Meds: denies taking medication for diabetes  Microvascular complications: denies  Macrovascular complications: has NICM, denies MI or CVA  SMBG: does not check  Symptoms: endorses polyuria, polydipsia, denies neuropathy  Diet at home:  - breakfast: fish, gritz  - lunch: spaghetti  - dinner: "does not eat much"  - occasionally cookies and juice  Exercise: walks  PMHx: as above  PSHx: as above  Family hx: denies family history of diabetes or thyroid disease  Social hx: denies tobacco use, alcohol use or recreational drug use    PAST MEDICAL & SURGICAL HISTORY:  HTN      SVT (Supraventricular Tachycardia)      Non-Ischemic Cardiomyopathy  now s/p transplant 2018      GIB (gastrointestinal bleeding)      Hepatitis C virus      DVT of upper extremity (deep vein thrombosis)      Former smoker      HLD (hyperlipidemia)      Knee pain, right      H/O autoimmune hemolytic anemia      H/O hemolytic anemia      Status post left hip replacement      H/O heart transplant  2/2018          FAMILY HISTORY:      Social History:    Outpatient Medications:    MEDICATIONS  (STANDING):  apixaban 5 milliGRAM(s) Oral every 12 hours  aspirin  chewable 81 milliGRAM(s) Oral daily  atorvastatin 10 milliGRAM(s) Oral at bedtime  dextrose 5%. 1000 milliLiter(s) (50 mL/Hr) IV Continuous <Continuous>  dextrose 5%. 1000 milliLiter(s) (100 mL/Hr) IV Continuous <Continuous>  dextrose 50% Injectable 25 Gram(s) IV Push once  dextrose 50% Injectable 12.5 Gram(s) IV Push once  dextrose 50% Injectable 25 Gram(s) IV Push once  febuxostat 40 milliGRAM(s) Oral daily  folic acid 1 milliGRAM(s) Oral daily  furosemide   Injectable 40 milliGRAM(s) IV Push two times a day  glucagon  Injectable 1 milliGRAM(s) IntraMuscular once  insulin glargine Injectable (LANTUS) 18 Unit(s) SubCutaneous at bedtime  insulin lispro (ADMELOG) corrective regimen sliding scale   SubCutaneous three times a day before meals  insulin lispro (ADMELOG) corrective regimen sliding scale   SubCutaneous at bedtime  insulin lispro Injectable (ADMELOG) 26 Unit(s) SubCutaneous before breakfast  insulin lispro Injectable (ADMELOG) 10 Unit(s) SubCutaneous before lunch  insulin lispro Injectable (ADMELOG) 6 Unit(s) SubCutaneous before dinner  insulin regular  human recombinant. 3 Unit(s) SubCutaneous once  metoprolol succinate  milliGRAM(s) Oral daily  nystatin    Suspension 648526 Unit(s) Oral three times a day  pantoprazole    Tablet 40 milliGRAM(s) Oral before breakfast  predniSONE   Tablet 30 milliGRAM(s) Oral daily  senna 2 Tablet(s) Oral at bedtime  tacrolimus 2.5 milliGRAM(s) Oral two times a day  trimethoprim   80 mG/sulfamethoxazole 400 mG 1 Tablet(s) Oral every 24 hours  valGANciclovir 450 milliGRAM(s) Oral daily    MEDICATIONS  (PRN):  acetaminophen     Tablet .. 650 milliGRAM(s) Oral every 6 hours PRN Temp greater or equal to 38C (100.4F), Mild Pain (1 - 3)  aluminum hydroxide/magnesium hydroxide/simethicone Suspension 30 milliLiter(s) Oral every 4 hours PRN Dyspepsia  dextrose Oral Gel 15 Gram(s) Oral once PRN Blood Glucose LESS THAN 70 milliGRAM(s)/deciliter  melatonin 3 milliGRAM(s) Oral at bedtime PRN Insomnia  ondansetron Injectable 4 milliGRAM(s) IV Push every 8 hours PRN Nausea and/or Vomiting      Allergies    No Known Allergies    Intolerances      Review of Systems:  Constitutional: No fever  Eyes: No blurry vision  Neuro: No tremors  HEENT: No pain  Cardiovascular: No chest pain, palpitations  Respiratory: No SOB, no cough  GI: No nausea, vomiting, abdominal pain  : No dysuria  Skin: no rash  Psych: no depression  Endocrine: no polyuria, polydipsia  Hem/lymph: no swelling  Osteoporosis: no fractures    ALL OTHER SYSTEMS REVIEWED AND NEGATIVE    UNABLE TO OBTAIN    PHYSICAL EXAM:  VITALS: T(C): 36.7 (05-24-23 @ 15:01)  T(F): 98 (05-24-23 @ 15:01), Max: 98 (05-24-23 @ 15:01)  HR: 98 (05-24-23 @ 15:01) (91 - 106)  BP: 110/80 (05-24-23 @ 15:01) (99/75 - 120/89)  RR:  (16 - 20)  SpO2:  (97% - 100%)  Wt(kg): --  GENERAL: NAD, well-groomed, well-developed  EYES: No proptosis, no lid lag, anicteric  HEENT:  Atraumatic, Normocephalic, moist mucous membranes  THYROID: Normal size, no palpable nodules  RESPIRATORY: Clear to auscultation bilaterally; No rales, rhonchi, wheezing, or rubs  CARDIOVASCULAR: Regular rate and rhythm; No murmurs; no peripheral edema  GI: Soft, nontender, non distended, normal bowel sounds  SKIN: Dry, intact, No rashes or lesions  MUSCULOSKELETAL: Full range of motion, normal strength  NEURO: sensation intact, extraocular movements intact, no tremor, normal reflexes  PSYCH: Alert and oriented x 3, normal affect, normal mood  CUSHING'S SIGNS: no striae    POCT Blood Glucose.: 248 mg/dL (05-24-23 @ 11:18)  POCT Blood Glucose.: 180 mg/dL (05-24-23 @ 07:42)  POCT Blood Glucose.: 291 mg/dL (05-24-23 @ 05:46)  POCT Blood Glucose.: 433 mg/dL (05-24-23 @ 04:11)  POCT Blood Glucose.: 468 mg/dL (05-24-23 @ 03:33)  POCT Blood Glucose.: 463 mg/dL (05-24-23 @ 03:33)  POCT Blood Glucose.: 545 mg/dL (05-24-23 @ 02:31)  POCT Blood Glucose.: 512 mg/dL (05-24-23 @ 02:29)  POCT Blood Glucose.: 487 mg/dL (05-24-23 @ 00:28)  POCT Blood Glucose.: 479 mg/dL (05-24-23 @ 00:25)                            10.8   12.69 )-----------( 190      ( 24 May 2023 06:00 )             34.7       05-24    141  |  99  |  37<H>  ----------------------------<  299<H>  5.8<H>   |  27  |  1.82<H>    eGFR: 39<L>    Ca    8.9      05-24  Mg     2.0     05-24    TPro  5.7<L>  /  Alb  3.9  /  TBili  0.7  /  DBili  x   /  AST  16  /  ALT  25  /  AlkPhos  61  05-23   Reason For Consult: DM     HPI:  73y M PMH  HTN, HLD, Nonischemic cardiomyopathy, chronic systolic heart failure s/p HM2 LVAD (6/2017), s/p heart transplant from Hep. C donor (treated) 2/23/18 (post op course complicated by graft dysfunction treated by plasmapheresis, IVIG, and rituximab), being sent from HF clinic for worsening sx. Has been experiencing worsening SOB, increased LE edema, weakness and near syncopal episode. Pt has experienced wt gain and have been requiring increasing doses of diuretics at home. He had near syncopal episode while at the HF clinic today in presence of Raymond Beebe no fall, injury, head trauma, LOC. Given worsening sx, was sent for further management and Iv diuretics     Denies CP, palpitation, N/V/D, fever, cough, chills, dizziness, abm pain       73y M PMH HTN, HLD, Nonischemic cardiomyopathy, chronic systolic heart failure s/p HM2 LVAD, s/p heart transplant, hx/o Grayson's Syndrome on high dose steroid consult for insulin management iso steroid induced hyperglycemia.  He was seen by the Endo during previous admission in 3/2023.   He is a poor historian, unable to obtain any meaningful history from him.  History obtained from chart review.      ENDOCRINE HISTORY   Diagnosed with DM: diabetes after transplant 2018, patient denies history of diabetes  Endocrinologist: does not have  Home DM Meds: Unsure what he is taking and if he is taking it, he kept saying he takes it sometimes, and does not know what he takes ??   Microvascular complications: denies  Macrovascular complications: has NICM, denies MI or CVA  SMBG: does not check  Exercise: walks  PMHx: as above  PSHx: as above  Family hx: denies family history of diabetes or thyroid disease  Social hx: denies tobacco use, alcohol use or recreational drug use    PAST MEDICAL & SURGICAL HISTORY:  HTN      SVT (Supraventricular Tachycardia)      Non-Ischemic Cardiomyopathy  now s/p transplant 2018      GIB (gastrointestinal bleeding)      Hepatitis C virus      DVT of upper extremity (deep vein thrombosis)      Former smoker      HLD (hyperlipidemia)      Knee pain, right      H/O autoimmune hemolytic anemia      H/O hemolytic anemia      Status post left hip replacement      H/O heart transplant  2/2018          FAMILY HISTORY: as per HPI       Social History: as per HPI     Outpatient Medications:    MEDICATIONS  (STANDING):  apixaban 5 milliGRAM(s) Oral every 12 hours  aspirin  chewable 81 milliGRAM(s) Oral daily  atorvastatin 10 milliGRAM(s) Oral at bedtime  dextrose 5%. 1000 milliLiter(s) (50 mL/Hr) IV Continuous <Continuous>  dextrose 5%. 1000 milliLiter(s) (100 mL/Hr) IV Continuous <Continuous>  dextrose 50% Injectable 25 Gram(s) IV Push once  dextrose 50% Injectable 12.5 Gram(s) IV Push once  dextrose 50% Injectable 25 Gram(s) IV Push once  febuxostat 40 milliGRAM(s) Oral daily  folic acid 1 milliGRAM(s) Oral daily  furosemide   Injectable 40 milliGRAM(s) IV Push two times a day  glucagon  Injectable 1 milliGRAM(s) IntraMuscular once  insulin glargine Injectable (LANTUS) 18 Unit(s) SubCutaneous at bedtime  insulin lispro (ADMELOG) corrective regimen sliding scale   SubCutaneous three times a day before meals  insulin lispro (ADMELOG) corrective regimen sliding scale   SubCutaneous at bedtime  insulin lispro Injectable (ADMELOG) 26 Unit(s) SubCutaneous before breakfast  insulin lispro Injectable (ADMELOG) 10 Unit(s) SubCutaneous before lunch  insulin lispro Injectable (ADMELOG) 6 Unit(s) SubCutaneous before dinner  insulin regular  human recombinant. 3 Unit(s) SubCutaneous once  metoprolol succinate  milliGRAM(s) Oral daily  nystatin    Suspension [id number] Unit(s) Oral three times a day  pantoprazole    Tablet 40 milliGRAM(s) Oral before breakfast  predniSONE   Tablet 30 milliGRAM(s) Oral daily  senna 2 Tablet(s) Oral at bedtime  tacrolimus 2.5 milliGRAM(s) Oral two times a day  trimethoprim   80 mG/sulfamethoxazole 400 mG 1 Tablet(s) Oral every 24 hours  valGANciclovir 450 milliGRAM(s) Oral daily    MEDICATIONS  (PRN):  acetaminophen     Tablet .. 650 milliGRAM(s) Oral every 6 hours PRN Temp greater or equal to 38C (100.4F), Mild Pain (1 - 3)  aluminum hydroxide/magnesium hydroxide/simethicone Suspension 30 milliLiter(s) Oral every 4 hours PRN Dyspepsia  dextrose Oral Gel 15 Gram(s) Oral once PRN Blood Glucose LESS THAN 70 milliGRAM(s)/deciliter  melatonin 3 milliGRAM(s) Oral at bedtime PRN Insomnia  ondansetron Injectable 4 milliGRAM(s) IV Push every 8 hours PRN Nausea and/or Vomiting      Allergies    No Known Allergies    Intolerances      Review of Systems:  Constitutional: No fever  Eyes: No blurry vision  Neuro: No tremors  HEENT: No pain  Cardiovascular: No chest pain, palpitations  Respiratory: No SOB, no cough  GI: No nausea, vomiting, abdominal pain  : No dysuria  Skin: no rash  Psych: no depression  Endocrine: no polyuria, polydipsia  Hem/lymph: no swelling  Osteoporosis: no fractures    ALL OTHER SYSTEMS REVIEWED AND NEGATIVE      PHYSICAL EXAM:  VITALS: T(C): 36.7 (05-24-23 @ 15:01)  T(F): 98 (05-24-23 @ 15:01), Max: 98 (05-24-23 @ 15:01)  HR: 98 (05-24-23 @ 15:01) (91 - 106)  BP: 110/80 (05-24-23 @ 15:01) (99/75 - 120/89)  RR:  (16 - 20)  SpO2:  (97% - 100%)  Wt(kg): --  GENERAL: NAD, well-groomed, well-developed  EYES: No proptosis, no lid lag, anicteric  HEENT:  Atraumatic, Normocephalic, moist mucous membranes  THYROID: Normal size, no palpable nodules  RESPIRATORY: Clear to auscultation bilaterally; No rales, rhonchi, wheezing, or rubs  CARDIOVASCULAR: Regular rate and rhythm; No murmurs; no peripheral edema  GI: Soft, nontender, non distended, normal bowel sounds  PSYCH: Alert and oriented x 3, normal affect, normal mood  CUSHING'S SIGNS: no striae    POCT Blood Glucose.: 248 mg/dL (05-24-23 @ 11:18)  POCT Blood Glucose.: 180 mg/dL (05-24-23 @ 07:42)  POCT Blood Glucose.: 291 mg/dL (05-24-23 @ 05:46)  POCT Blood Glucose.: 433 mg/dL (05-24-23 @ 04:11)  POCT Blood Glucose.: 468 mg/dL (05-24-23 @ 03:33)  POCT Blood Glucose.: 463 mg/dL (05-24-23 @ 03:33)  POCT Blood Glucose.: 545 mg/dL (05-24-23 @ 02:31)  POCT Blood Glucose.: 512 mg/dL (05-24-23 @ 02:29)  POCT Blood Glucose.: 487 mg/dL (05-24-23 @ 00:28)  POCT Blood Glucose.: 479 mg/dL (05-24-23 @ 00:25)                            10.8   12.69 )-----------( 190      ( 24 May 2023 06:00 )             34.7       05-24    141  |  99  |  37<H>  ----------------------------<  299<H>  5.8<H>   |  27  |  1.82<H>    eGFR: 39<L>    Ca    8.9      05-24  Mg     2.0     05-24    TPro  5.7<L>  /  Alb  3.9  /  TBili  0.7  /  DBili  x   /  AST  16  /  ALT  25  /  AlkPhos  61  05-23

## 2023-05-24 NOTE — CONSULT NOTE ADULT - PROBLEM SELECTOR RECOMMENDATION 5
-Home: Tacro 2/2, pred 30mg for hemolytic anemia  -plan above for possible biopsy given EF changes -Home: Tacro 2/2, pred 30mg for hemolytic anemia  -tacro level 30 prior to AM dose  -plan above for possible biopsy given EF changes -Home: Tacro 2/2, pred 30mg for hemolytic anemia  -tacro level 30 prior to AM dose  -plan above for possible biopsy given EF changes but first will do Allosure testing

## 2023-05-24 NOTE — PROGRESS NOTE ADULT - PROBLEM SELECTOR PLAN 6
- baseline Cr 1.8, (baseline ~ 1.5)  - Trend labs, monitor renal function   - Avoid nephrotoxic meds

## 2023-05-24 NOTE — PROGRESS NOTE ADULT - PROBLEM SELECTOR PLAN 4
Dx'd after he was admitted to [facility] on 1/4/23 for hemolytic anemia with hgb 6.5.  Was treated with pulse Decadron and IVIG. He received 1 unit of packed red blood cells and then prednisone was started. He was discharged on 1/7/23. Was in remission with well compensated hemolytic anemia on low dose prednisone, but relapsed after tapered to 3 mg daily.   Was re-started on high dose prednisone and responding currently on Prednisone 40mg qd   - H&H appears stable, will trend cbc   - C/w Prednisone 40mg qd    - heme onc/consult pending

## 2023-05-24 NOTE — PROGRESS NOTE ADULT - SUBJECTIVE AND OBJECTIVE BOX
patient endorsing shortness of breath with exertion, asymptomatic at rest.    GENERAL: No fevers, no chills.  EYES: No blurry vision,  No photophobia  ENT: No sore throat.  No dysphagia  Cardiovascular: No chest pain, palpitations, orthopnea  Pulmonary: No cough, no wheezing. No shortness of breath  Gastrointestinal: No abdominal pain, no diarrhea, no constipation.   Musculoskeletal: No weakness.  No myalgias.  Dermatology:  No rashes.  Neuro: No Headache.  No vertigo.  No dizziness.  Psych: No anxiety, no depression.  Denies suicidal thoughts.    MEDICATIONS  (STANDING):  apixaban 5 milliGRAM(s) Oral every 12 hours  aspirin  chewable 81 milliGRAM(s) Oral daily  atorvastatin 10 milliGRAM(s) Oral at bedtime  dextrose 5%. 1000 milliLiter(s) (50 mL/Hr) IV Continuous <Continuous>  dextrose 5%. 1000 milliLiter(s) (100 mL/Hr) IV Continuous <Continuous>  dextrose 50% Injectable 25 Gram(s) IV Push once  dextrose 50% Injectable 12.5 Gram(s) IV Push once  dextrose 50% Injectable 25 Gram(s) IV Push once  febuxostat 40 milliGRAM(s) Oral daily  folic acid 1 milliGRAM(s) Oral daily  furosemide   Injectable 40 milliGRAM(s) IV Push daily  glucagon  Injectable 1 milliGRAM(s) IntraMuscular once  insulin glargine Injectable (LANTUS) 18 Unit(s) SubCutaneous at bedtime  insulin lispro (ADMELOG) corrective regimen sliding scale   SubCutaneous three times a day before meals  insulin lispro (ADMELOG) corrective regimen sliding scale   SubCutaneous at bedtime  insulin lispro Injectable (ADMELOG) 26 Unit(s) SubCutaneous before breakfast  insulin lispro Injectable (ADMELOG) 10 Unit(s) SubCutaneous before lunch  insulin lispro Injectable (ADMELOG) 6 Unit(s) SubCutaneous before dinner  insulin regular  human recombinant. 3 Unit(s) SubCutaneous once  losartan 25 milliGRAM(s) Oral daily  metoprolol succinate  milliGRAM(s) Oral daily  nystatin    Suspension 210954 Unit(s) Oral three times a day  pantoprazole    Tablet 40 milliGRAM(s) Oral before breakfast  predniSONE   Tablet 30 milliGRAM(s) Oral daily  senna 2 Tablet(s) Oral at bedtime  tacrolimus 2.5 milliGRAM(s) Oral two times a day  trimethoprim   80 mG/sulfamethoxazole 400 mG 1 Tablet(s) Oral every 24 hours  valGANciclovir 450 milliGRAM(s) Oral daily    MEDICATIONS  (PRN):  acetaminophen     Tablet .. 650 milliGRAM(s) Oral every 6 hours PRN Temp greater or equal to 38C (100.4F), Mild Pain (1 - 3)  aluminum hydroxide/magnesium hydroxide/simethicone Suspension 30 milliLiter(s) Oral every 4 hours PRN Dyspepsia  dextrose Oral Gel 15 Gram(s) Oral once PRN Blood Glucose LESS THAN 70 milliGRAM(s)/deciliter  melatonin 3 milliGRAM(s) Oral at bedtime PRN Insomnia  ondansetron Injectable 4 milliGRAM(s) IV Push every 8 hours PRN Nausea and/or Vomiting    Vital Signs Last 24 Hrs  T(C): 36.4 (24 May 2023 13:00), Max: 36.6 (23 May 2023 15:24)  T(F): 97.5 (24 May 2023 13:00), Max: 97.9 (24 May 2023 04:10)  HR: 106 (24 May 2023 13:00) (91 - 106)  BP: 110/75 (24 May 2023 13:00) (99/75 - 120/89)  BP(mean): 83 (24 May 2023 12:10) (83 - 83)  RR: 18 (24 May 2023 13:00) (16 - 20)  SpO2: 98% (24 May 2023 13:00) (97% - 100%)    Parameters below as of 24 May 2023 13:00  Patient On (Oxygen Delivery Method): room air    GENERAL: NAD  HEAD:  Atraumatic, Normocephalic  EYES: EOMI, PERRLA, conjunctiva and sclera clear  ENT: Pharynx not erythematous  PULMONARY: Clear to auscultation bilaterally; No wheeze  CARDIOVASCULAR: Regular rate and rhythm; No murmurs, rubs, or gallops, no jvd  ABDOMEN: Soft, Nontender, Nondistended; Bowel sounds present  EXTREMITIES:  2+ Peripheral Pulses; 1+ ankle edema  MUSCULOSKELETAL: No calf tenderness  PSYCH: AAOx3, normal affect  SKIN: warm and dry, No rashes or lesions    .  LABS:                         10.8   12.69 )-----------( 190      ( 24 May 2023 06:00 )             34.7     05-24    141  |  99  |  37<H>  ----------------------------<  299<H>  5.8<H>   |  27  |  1.82<H>    Ca    8.9      24 May 2023 06:00  Mg     2.0     05-24    TPro  5.7<L>  /  Alb  3.9  /  TBili  0.7  /  DBili  x   /  AST  16  /  ALT  25  /  AlkPhos  61  05-23    PT/INR - ( 23 May 2023 18:47 )   PT: 16.6 sec;   INR: 1.43 ratio                   RADIOLOGY, EKG & ADDITIONAL TESTS: Reviewed.

## 2023-05-24 NOTE — CONSULT NOTE ADULT - ASSESSMENT
reduce to Lantus 18 units QHS, can reduce dose by 50% x1  if BG <100  -  Bedtime BG remains tightly controlled reduce to Admelog 26-10-6  w/meals FOR NOW.  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  He was seen by the Endo during previous admission in 3/2023.   He is a poor historian, unable to obtain any meaningful history from him.  History obtained from chart review.      # Stress Hyperglycemia  # Chronic Kidney disease - cautious with insulin titration; high risk for insulin stacking and hypoglycemia  # Non-compliance with diet and meds   # Diabetes Mellitus type II   A1c: 8.6%   Home meds: unsure    GFR/Cr: 39/1.82     - Pt was admitted in 3/2023 and during the admission, he was managed on Lantus 18 and Admelog 26/10/6 with meals   - Blood glucose target while hospitalized 140-180 mg/dL  - Increase Lantus to 20 U HS   - c/w Lispro 26/10/6 as per previous admission   - c/w mod scale ss with meals and low dose ss at night   - do not give scheduled prandial insulin if patient is NPO  - please monitor fingerstick blood glucose at least 4 times a day to avoid insulin toxicity, hypoglycemia  - Carbohydrate controlled diet, no concentrated sweets  - Counseled on need for medication adherence to avoid new complications.   DISCHARGE: would simply his regimen with possible GLP-1, but need to address compliance again on discharge     # Dyslipidemia: c/w atorvastatin 10 mg HS, LDL goal < 70   # Hypertension: BP goal < 130/80, will defer to primary team     Mary Cole MD  Pager: 9AM - 5PM (Mon-Fri): [phone]  After 5PM and on Weekends: [phone]     For nonurgent matters, please email [email or web address] for assistance.    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  He was seen by the Endo during previous admission in 3/2023.   He is a poor historian, unable to obtain any meaningful history from him.  History obtained from chart review.      # Stress Hyperglycemia  # Chronic Kidney disease - cautious with insulin titration; high risk for insulin stacking and hypoglycemia  # Non-compliance with diet and meds   # Diabetes Mellitus type II   A1c: 8.6%   Home meds: unsure    GFR/Cr: 39/1.82     - Pt was admitted in 3/2023 and during the admission, he was managed on Lantus 18 and Admelog 26/10/6 with meals while being on Prednsione 60 mg daily.   - He is currently on steroid taper - 40 mg 5/24, 30 mg 5/25.  Primary team will verify regarding the taper, b/c unsure what pt was on and they will c/w a certain dose daily.    - He received 18 U at night and , steroid is suppose to taper down, would c/w Lantus 18 U daily for now   - Blood glucose target while hospitalized 140-180 mg/dL  - c/w Lispro 26/10/6 as per previous admission and will adjust as appropriate.   - c/w mod scale ss with meals and low dose ss at night   - do not give scheduled prandial insulin if patient is NPO  - please monitor fingerstick blood glucose at least 4 times a day to avoid insulin toxicity, hypoglycemia  - Carbohydrate controlled diet, no concentrated sweets  - Counseled on need for medication adherence to avoid new complications.   DISCHARGE: would simply his regimen with possible GLP-1, but need to address compliance again on discharge     # Dyslipidemia: c/w atorvastatin 10 mg HS, LDL goal < 70   # Hypertension: BP goal < 130/80, will defer to primary team     Mary Cole MD  Pager: 9AM - 5PM (Mon-Fri): [phone]  After 5PM and on Weekends: [phone]     For nonurgent matters, please email [email or web address] for assistance.    chart, meds, labs, imaging reviewed

## 2023-05-24 NOTE — ED ADULT NURSE REASSESSMENT NOTE - NS ED NURSE REASSESS COMMENT FT1
MD Linder contacted via TEAMs message regarding pt FS, MD aware and no further interventions at this time

## 2023-05-24 NOTE — CONSULT NOTE ADULT - SUBJECTIVE AND OBJECTIVE BOX
HPI:  73y M PMH  HTN, HLD, Nonischemic cardiomyopathy, chronic systolic heart failure s/p HM2 LVAD (6/2017), s/p heart transplant from Hep. C donor (treated) 2/23/18 (post op course complicated by graft dysfunction treated by plasmapheresis, IVIG, and rituximab), being sent from HF clinic for worsening sx. Has been experiencing worsening SOB, increased LE edema, weakness and near syncopal episode. Pt has experienced wt gain and have been requiring increasing doses of diuretics at home. He had near syncopal episode while at the HF clinic today in presence of Raymond Beebe no fall, injury, head trauma, LOC. Given worsening sx, was sent for further management and Iv diuretics. The patient states that he never passed out but his legs felt weak from the fluids.     Denies CP, palpitation, N/V/D, fever, cough, chills, dizziness, abm pain        (23 May 2023 20:36)      PAST MEDICAL & SURGICAL HISTORY:  HTN      SVT (Supraventricular Tachycardia)      Non-Ischemic Cardiomyopathy  now s/p transplant 2018      GIB (gastrointestinal bleeding)      Hepatitis C virus      DVT of upper extremity (deep vein thrombosis)      Former smoker      HLD (hyperlipidemia)      Knee pain, right      H/O autoimmune hemolytic anemia      H/O hemolytic anemia      Status post left hip replacement      H/O heart transplant  2/2018          REVIEW OF SYSTEMS:  CONSTITUTIONAL: + leg weakness, fevers or chills  EYES/ENT: No visual changes;  No dysphagia  NECK: No pain or stiffness  RESPIRATORY: No cough, wheezing, hemoptysis; No shortness of breath  CARDIOVASCULAR: No chest pain or palpitations; No lower extremity edema  GASTROINTESTINAL: No abdominal or epigastric pain. No nausea, vomiting, or hematemesis; No diarrhea or constipation. No melena or hematochezia.  BACK: No back pain  GENITOURINARY: No dysuria, frequency or hematuria  NEUROLOGICAL: No numbness or weakness  SKIN: No itching, burning, rashes, or lesions   All other review of systems is negative unless indicated above.    MEDICATIONS  (STANDING):  apixaban 5 milliGRAM(s) Oral every 12 hours  aspirin  chewable 81 milliGRAM(s) Oral daily  atorvastatin 10 milliGRAM(s) Oral at bedtime  dextrose 5%. 1000 milliLiter(s) (50 mL/Hr) IV Continuous <Continuous>  dextrose 5%. 1000 milliLiter(s) (100 mL/Hr) IV Continuous <Continuous>  dextrose 50% Injectable 25 Gram(s) IV Push once  dextrose 50% Injectable 12.5 Gram(s) IV Push once  dextrose 50% Injectable 25 Gram(s) IV Push once  febuxostat 40 milliGRAM(s) Oral daily  folic acid 1 milliGRAM(s) Oral daily  furosemide   Injectable 40 milliGRAM(s) IV Push daily  glucagon  Injectable 1 milliGRAM(s) IntraMuscular once  insulin glargine Injectable (LANTUS) 18 Unit(s) SubCutaneous at bedtime  insulin lispro (ADMELOG) corrective regimen sliding scale   SubCutaneous three times a day before meals  insulin lispro (ADMELOG) corrective regimen sliding scale   SubCutaneous at bedtime  insulin lispro Injectable (ADMELOG) 26 Unit(s) SubCutaneous before breakfast  insulin lispro Injectable (ADMELOG) 10 Unit(s) SubCutaneous before lunch  insulin lispro Injectable (ADMELOG) 6 Unit(s) SubCutaneous before dinner  insulin regular  human recombinant. 3 Unit(s) SubCutaneous once  losartan 25 milliGRAM(s) Oral daily  metoprolol succinate  milliGRAM(s) Oral daily  nystatin    Suspension [id number] Unit(s) Oral three times a day  pantoprazole    Tablet 40 milliGRAM(s) Oral before breakfast  predniSONE   Tablet 30 milliGRAM(s) Oral daily  senna 2 Tablet(s) Oral at bedtime  tacrolimus 2.5 milliGRAM(s) Oral two times a day  trimethoprim   80 mG/sulfamethoxazole 400 mG 1 Tablet(s) Oral every 24 hours  valGANciclovir 450 milliGRAM(s) Oral daily    MEDICATIONS  (PRN):  acetaminophen     Tablet .. 650 milliGRAM(s) Oral every 6 hours PRN Temp greater or equal to 38C (100.4F), Mild Pain (1 - 3)  aluminum hydroxide/magnesium hydroxide/simethicone Suspension 30 milliLiter(s) Oral every 4 hours PRN Dyspepsia  dextrose Oral Gel 15 Gram(s) Oral once PRN Blood Glucose LESS THAN 70 milliGRAM(s)/deciliter  melatonin 3 milliGRAM(s) Oral at bedtime PRN Insomnia  ondansetron Injectable 4 milliGRAM(s) IV Push every 8 hours PRN Nausea and/or Vomiting      Allergies    No Known Allergies    Intolerances        SOCIAL HISTORY:    FAMILY HISTORY:      Vital Signs Last 24 Hrs  T(C): 36.4 (24 May 2023 13:00), Max: 36.6 (23 May 2023 15:24)  T(F): 97.5 (24 May 2023 13:00), Max: 97.9 (24 May 2023 04:10)  HR: 106 (24 May 2023 13:00) (91 - 106)  BP: 110/75 (24 May 2023 13:00) (99/75 - 120/89)  BP(mean): 83 (24 May 2023 12:10) (83 - 83)  RR: 18 (24 May 2023 13:00) (16 - 20)  SpO2: 98% (24 May 2023 13:00) (97% - 100%)    Parameters below as of 24 May 2023 13:00  Patient On (Oxygen Delivery Method): room air      Gen: well appearing in NAD  HEENT: EOMI, MMM  Cardio: S1 S2 no murmurs, rubs or gallops  Chest:crackels at the bases b/l  Abd: soft nontender to palpation, bS+  Extremities: 2+ pitting edema b/l leg        LABS:                        10.8   12.69 )-----------( 190      ( 24 May 2023 06:00 )             34.7     05-24    141  |  99  |  37<H>  ----------------------------<  299<H>  5.8<H>   |  27  |  1.82<H>    Ca    8.9      24 May 2023 06:00  Mg     2.0     05-24    TPro  5.7<L>  /  Alb  3.9  /  TBili  0.7  /  DBili  x   /  AST  16  /  ALT  25  /  AlkPhos  61  05-23    PT/INR - ( 23 May 2023 18:47 )   PT: 16.6 sec;   INR: 1.43 ratio

## 2023-05-24 NOTE — CONSULT NOTE ADULT - NS ATTEST RISK PROBLEM GEN_ALL_CORE FT
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

## 2023-05-25 DIAGNOSIS — I50.89 OTHER HEART FAILURE: ICD-10-CM

## 2023-05-25 DIAGNOSIS — Z02.9 ENCOUNTER FOR ADMINISTRATIVE EXAMINATIONS, UNSPECIFIED: ICD-10-CM

## 2023-05-25 LAB
ANION GAP SERPL CALC-SCNC: 16 MMOL/L — SIGNIFICANT CHANGE UP (ref 5–17)
BUN SERPL-MCNC: 46 MG/DL — HIGH (ref 7–23)
CALCIUM SERPL-MCNC: 9 MG/DL — SIGNIFICANT CHANGE UP (ref 8.4–10.5)
CHLORIDE SERPL-SCNC: 96 MMOL/L — SIGNIFICANT CHANGE UP (ref 96–108)
CO2 SERPL-SCNC: 24 MMOL/L — SIGNIFICANT CHANGE UP (ref 22–31)
CREAT SERPL-MCNC: 1.92 MG/DL — HIGH (ref 0.5–1.3)
EGFR: 36 ML/MIN/1.73M2 — LOW
GLUCOSE BLDC GLUCOMTR-MCNC: 160 MG/DL — HIGH (ref 70–99)
GLUCOSE BLDC GLUCOMTR-MCNC: 208 MG/DL — HIGH (ref 70–99)
GLUCOSE BLDC GLUCOMTR-MCNC: 283 MG/DL — HIGH (ref 70–99)
GLUCOSE BLDC GLUCOMTR-MCNC: 98 MG/DL — SIGNIFICANT CHANGE UP (ref 70–99)
GLUCOSE SERPL-MCNC: 250 MG/DL — HIGH (ref 70–99)
MAGNESIUM SERPL-MCNC: 1.9 MG/DL — SIGNIFICANT CHANGE UP (ref 1.6–2.6)
POTASSIUM SERPL-MCNC: 4.7 MMOL/L — SIGNIFICANT CHANGE UP (ref 3.5–5.3)
POTASSIUM SERPL-SCNC: 4.7 MMOL/L — SIGNIFICANT CHANGE UP (ref 3.5–5.3)
SODIUM SERPL-SCNC: 136 MMOL/L — SIGNIFICANT CHANGE UP (ref 135–145)
TACROLIMUS SERPL-MCNC: 5.3 NG/ML — SIGNIFICANT CHANGE UP

## 2023-05-25 PROCEDURE — 76770 US EXAM ABDO BACK WALL COMP: CPT | Mod: 26

## 2023-05-25 PROCEDURE — 99233 SBSQ HOSP IP/OBS HIGH 50: CPT

## 2023-05-25 PROCEDURE — 99234 HOSP IP/OBS SM DT SF/LOW 45: CPT

## 2023-05-25 PROCEDURE — 99232 SBSQ HOSP IP/OBS MODERATE 35: CPT

## 2023-05-25 RX ADMIN — Medication 30 MILLIGRAM(S): at 05:47

## 2023-05-25 RX ADMIN — TACROLIMUS 2.5 MILLIGRAM(S): 5 CAPSULE ORAL at 21:17

## 2023-05-25 RX ADMIN — Medication 26 UNIT(S): at 09:03

## 2023-05-25 RX ADMIN — VALGANCICLOVIR 450 MILLIGRAM(S): 450 TABLET, FILM COATED ORAL at 11:50

## 2023-05-25 RX ADMIN — PANTOPRAZOLE SODIUM 40 MILLIGRAM(S): 20 TABLET, DELAYED RELEASE ORAL at 05:47

## 2023-05-25 RX ADMIN — Medication 1 MILLIGRAM(S): at 11:50

## 2023-05-25 RX ADMIN — Medication 500000 UNIT(S): at 05:47

## 2023-05-25 RX ADMIN — SENNA PLUS 2 TABLET(S): 8.6 TABLET ORAL at 21:19

## 2023-05-25 RX ADMIN — APIXABAN 5 MILLIGRAM(S): 2.5 TABLET, FILM COATED ORAL at 17:06

## 2023-05-25 RX ADMIN — Medication 1 TABLET(S): at 22:14

## 2023-05-25 RX ADMIN — TACROLIMUS 2.5 MILLIGRAM(S): 5 CAPSULE ORAL at 11:50

## 2023-05-25 RX ADMIN — Medication 200 MILLIGRAM(S): at 05:47

## 2023-05-25 RX ADMIN — Medication 500000 UNIT(S): at 21:18

## 2023-05-25 RX ADMIN — Medication 6 UNIT(S): at 17:07

## 2023-05-25 RX ADMIN — FEBUXOSTAT 40 MILLIGRAM(S): 40 TABLET ORAL at 11:50

## 2023-05-25 RX ADMIN — APIXABAN 5 MILLIGRAM(S): 2.5 TABLET, FILM COATED ORAL at 05:47

## 2023-05-25 RX ADMIN — Medication 2: at 13:06

## 2023-05-25 RX ADMIN — Medication 81 MILLIGRAM(S): at 11:50

## 2023-05-25 RX ADMIN — Medication 500000 UNIT(S): at 14:32

## 2023-05-25 RX ADMIN — Medication 3: at 09:03

## 2023-05-25 RX ADMIN — ATORVASTATIN CALCIUM 10 MILLIGRAM(S): 80 TABLET, FILM COATED ORAL at 21:19

## 2023-05-25 RX ADMIN — INSULIN GLARGINE 18 UNIT(S): 100 INJECTION, SOLUTION SUBCUTANEOUS at 22:14

## 2023-05-25 RX ADMIN — Medication 10 UNIT(S): at 13:07

## 2023-05-25 RX ADMIN — Medication 40 MILLIGRAM(S): at 05:47

## 2023-05-25 RX ADMIN — Medication 40 MILLIGRAM(S): at 14:31

## 2023-05-25 RX ADMIN — Medication 1: at 17:07

## 2023-05-25 NOTE — DIETITIAN INITIAL EVALUATION ADULT - NSFNSGIIOFT_GEN_A_CORE
Pt confirms Ht 67"  Dosing wt: 81.9kg/179.5lb (stated wt from admission 5/23)  UBW: ~180lb but pt states he has fluid retention and on diuretic for HF might mask true dry weight of pt.   Wt from current admission (standing): 173.7lb (5/25), noted with wt loss of 5.8lb might due to fluid shift, RD will continue to monitor wt trends as available/able.   Wt history from previous admission: 160.1lb (3/9/23), 158lb (11/7/20), 190lb (9/4/20), 165lb (8/17/20)  IBW: 148lb

## 2023-05-25 NOTE — PROGRESS NOTE ADULT - PROBLEM SELECTOR PLAN 6
-CMV: valgancyclovir 450 daily  -Toxo/PCP: bactrim 400-80 POD  -CAV: pravastatin 20mg daily, ASA 81mg   -Fungal: nystatin.

## 2023-05-25 NOTE — PROGRESS NOTE ADULT - ASSESSMENT
73M hx NICM s/p heart transplant 2018 complicated by graft dysfunction treated by plasmapheresis, IVIG, and rituximab, admitted with acute on chronic systolic heart failure

## 2023-05-25 NOTE — PROGRESS NOTE ADULT - PROBLEM SELECTOR PLAN 8
-seen by jarred, continue pred, stated no role for rituximab at this time  -he will follow up with Dr. Rosario outpatient

## 2023-05-25 NOTE — PROGRESS NOTE ADULT - SUBJECTIVE AND OBJECTIVE BOX
Subjective: Patient is feeling much better, his leg swelling is improved. He was able to sleep flat overnight. No other complaints.     Medications:  acetaminophen     Tablet .. 650 milliGRAM(s) Oral every 6 hours PRN  aluminum hydroxide/magnesium hydroxide/simethicone Suspension 30 milliLiter(s) Oral every 4 hours PRN  apixaban 5 milliGRAM(s) Oral every 12 hours  aspirin  chewable 81 milliGRAM(s) Oral daily  atorvastatin 10 milliGRAM(s) Oral at bedtime  dextrose 5%. 1000 milliLiter(s) IV Continuous <Continuous>  dextrose 5%. 1000 milliLiter(s) IV Continuous <Continuous>  dextrose 50% Injectable 25 Gram(s) IV Push once  dextrose 50% Injectable 12.5 Gram(s) IV Push once  dextrose 50% Injectable 25 Gram(s) IV Push once  dextrose Oral Gel 15 Gram(s) Oral once PRN  febuxostat 40 milliGRAM(s) Oral daily  folic acid 1 milliGRAM(s) Oral daily  furosemide   Injectable 40 milliGRAM(s) IV Push two times a day  glucagon  Injectable 1 milliGRAM(s) IntraMuscular once  insulin glargine Injectable (LANTUS) 18 Unit(s) SubCutaneous at bedtime  insulin lispro (ADMELOG) corrective regimen sliding scale   SubCutaneous three times a day before meals  insulin lispro (ADMELOG) corrective regimen sliding scale   SubCutaneous at bedtime  insulin lispro Injectable (ADMELOG) 26 Unit(s) SubCutaneous before breakfast  insulin lispro Injectable (ADMELOG) 10 Unit(s) SubCutaneous before lunch  insulin lispro Injectable (ADMELOG) 6 Unit(s) SubCutaneous before dinner  insulin regular  human recombinant. 3 Unit(s) SubCutaneous once  melatonin 3 milliGRAM(s) Oral at bedtime PRN  metoprolol succinate  milliGRAM(s) Oral daily  nystatin    Suspension [id number] Unit(s) Oral three times a day  ondansetron Injectable 4 milliGRAM(s) IV Push every 8 hours PRN  pantoprazole    Tablet 40 milliGRAM(s) Oral before breakfast  predniSONE   Tablet 30 milliGRAM(s) Oral daily  senna 2 Tablet(s) Oral at bedtime  tacrolimus 2.5 milliGRAM(s) Oral two times a day  trimethoprim   80 mG/sulfamethoxazole 400 mG 1 Tablet(s) Oral every 24 hours  valGANciclovir 450 milliGRAM(s) Oral daily    Vitals:  T(C): 36.5 (05-25-23 @ 04:16), Max: 36.7 (05-24-23 @ 15:01)  HR: 97 (05-25-23 @ 04:16) (97 - 109)  BP: 134/87 (05-25-23 @ 11:12) (102/68 - 134/87)  RR: 18 (05-25-23 @ 04:16) (17 - 19)  SpO2: 98% (05-25-23 @ 11:12) (96% - 99%)    Weight (kg): 81.6 (05-23 @ 15:24)    I&O's Summary    24 May 2023 07:01  -  25 May 2023 07:00  --------------------------------------------------------  IN: 0 mL / OUT: 500 mL / NET: -500 mL        Physical Exam:  Appearance: No Acute Distress  Cardiovascular: RRR, Normal S1 S2, No murmurs/rubs/gallops  Respiratory: Clear to auscultation bilaterally  Gastrointestinal: Soft, Non-tender, non-distended	  Skin: no skin lesions  Neurologic: Non-focal  Extremities: 1+ pitting edema b/l, improved  Psychiatry: A & O x 3, Mood & affect appropriate        Labs:                        10.8   12.69 )-----------( 190      ( 24 May 2023 06:00 )             34.7     05-25    136  |  96  |  46<H>  ----------------------------<  250<H>  4.7   |  24  |  1.92<H>    Ca    9.0      25 May 2023 09:46  Mg     1.9     05-25    TPro  5.7<L>  /  Alb  3.9  /  TBili  0.7  /  DBili  x   /  AST  16  /  ALT  25  /  AlkPhos  61  05-23        Tacrolimus (), Serum: 4.2 ng/mL (05-24-23 @ 06:00)  Tacrolimus (), Serum: 6.9 ng/mL (05-23-23 @ 18:47)

## 2023-05-25 NOTE — PATIENT PROFILE ADULT - HAVE YOU EXPERIENCED VIOLENCE OR A TRAUMATIC EVENT?
She has a ENT and she wants to see them she call she was here and they can see her on Tuesday and she is okay with waiting until the end of this is not episode
We will irrigate in office today
no

## 2023-05-25 NOTE — PROGRESS NOTE ADULT - PROBLEM SELECTOR PLAN 5
Tacro 2.5/2.5, pred 30mg for hemolytic anemia  -tacro level prior to AM dose  -plan above for possible biopsy given EF changes but first will do Allosure testing.

## 2023-05-25 NOTE — PROGRESS NOTE ADULT - SUBJECTIVE AND OBJECTIVE BOX
Odilon Burgess MD    Patient is a 73y old  Male who presents with a chief complaint of near syncope, worsening HF sx (25 May 2023 11:14)        SUBJECTIVE / OVERNIGHT EVENTS: Patient seen and examined. Denies CP/SOB  TELEMETRY: SR/ST       MEDICATIONS  (STANDING):  apixaban 5 milliGRAM(s) Oral every 12 hours  aspirin  chewable 81 milliGRAM(s) Oral daily  atorvastatin 10 milliGRAM(s) Oral at bedtime  dextrose 5%. 1000 milliLiter(s) (100 mL/Hr) IV Continuous <Continuous>  dextrose 5%. 1000 milliLiter(s) (50 mL/Hr) IV Continuous <Continuous>  dextrose 50% Injectable 25 Gram(s) IV Push once  dextrose 50% Injectable 12.5 Gram(s) IV Push once  dextrose 50% Injectable 25 Gram(s) IV Push once  febuxostat 40 milliGRAM(s) Oral daily  folic acid 1 milliGRAM(s) Oral daily  furosemide   Injectable 40 milliGRAM(s) IV Push two times a day  glucagon  Injectable 1 milliGRAM(s) IntraMuscular once  insulin glargine Injectable (LANTUS) 18 Unit(s) SubCutaneous at bedtime  insulin lispro (ADMELOG) corrective regimen sliding scale   SubCutaneous three times a day before meals  insulin lispro (ADMELOG) corrective regimen sliding scale   SubCutaneous at bedtime  insulin lispro Injectable (ADMELOG) 26 Unit(s) SubCutaneous before breakfast  insulin lispro Injectable (ADMELOG) 10 Unit(s) SubCutaneous before lunch  insulin lispro Injectable (ADMELOG) 6 Unit(s) SubCutaneous before dinner  insulin regular  human recombinant. 3 Unit(s) SubCutaneous once  metoprolol succinate  milliGRAM(s) Oral daily  nystatin    Suspension 051962 Unit(s) Oral three times a day  pantoprazole    Tablet 40 milliGRAM(s) Oral before breakfast  predniSONE   Tablet 30 milliGRAM(s) Oral daily  senna 2 Tablet(s) Oral at bedtime  tacrolimus 2.5 milliGRAM(s) Oral two times a day  trimethoprim   80 mG/sulfamethoxazole 400 mG 1 Tablet(s) Oral every 24 hours  valGANciclovir 450 milliGRAM(s) Oral daily    MEDICATIONS  (PRN):  acetaminophen     Tablet .. 650 milliGRAM(s) Oral every 6 hours PRN Temp greater or equal to 38C (100.4F), Mild Pain (1 - 3)  aluminum hydroxide/magnesium hydroxide/simethicone Suspension 30 milliLiter(s) Oral every 4 hours PRN Dyspepsia  dextrose Oral Gel 15 Gram(s) Oral once PRN Blood Glucose LESS THAN 70 milliGRAM(s)/deciliter  melatonin 3 milliGRAM(s) Oral at bedtime PRN Insomnia  ondansetron Injectable 4 milliGRAM(s) IV Push every 8 hours PRN Nausea and/or Vomiting      Vital Signs Last 24 Hrs  T(C): 36.5 (25 May 2023 04:16), Max: 36.7 (24 May 2023 15:01)  T(F): 97.7 (25 May 2023 04:16), Max: 98.1 (24 May 2023 19:22)  HR: 97 (25 May 2023 04:16) (97 - 109)  BP: 134/87 (25 May 2023 11:12) (99/75 - 134/87)  BP(mean): --  RR: 18 (25 May 2023 04:16) (17 - 19)  SpO2: 98% (25 May 2023 11:12) (96% - 99%)    Parameters below as of 25 May 2023 11:12  Patient On (Oxygen Delivery Method): room air      CAPILLARY BLOOD GLUCOSE      POCT Blood Glucose.: 283 mg/dL (25 May 2023 08:56)  POCT Blood Glucose.: 184 mg/dL (24 May 2023 21:33)  POCT Blood Glucose.: 231 mg/dL (24 May 2023 17:02)    I&O's Summary    24 May 2023 07:01  -  25 May 2023 07:00  --------------------------------------------------------  IN: 0 mL / OUT: 500 mL / NET: -500 mL          PHYSICAL EXAM  GENERAL: NAD, well-developed  HEAD:  Atraumatic, normocephalic  EYES: EOMI, PERRLA, conjunctiva and sclera clear  CHEST/LUNG: Bibasilar rales; no wheezes  HEART: +S1+S2, regular rate and rhythm, 3/6 BRENT  ABDOMEN: Soft, nontender, nondistended; bowel sounds present  EXTREMITIES:  2+ peripheral pulses; no clubbing, cyanosis; 1-2+ pitting edema RLL, 1+ pitting edema LLE  PSYCH: AAOx3      LABS:                        10.8   12.69 )-----------( 190      ( 24 May 2023 06:00 )             34.7     05-24    141  |  99  |  37<H>  ----------------------------<  299<H>  5.8<H>   |  27  |  1.82<H>    Ca    8.9      24 May 2023 06:00  Mg     1.9     05-25    TPro  5.7<L>  /  Alb  3.9  /  TBili  0.7  /  DBili  x   /  AST  16  /  ALT  25  /  AlkPhos  61  05-23    PT/INR - ( 23 May 2023 18:47 )   PT: 16.6 sec;   INR: 1.43 ratio                   Culture - Blood (collected 23 May 2023 18:20)  Source: .Blood Blood-Peripheral  Preliminary Report (24 May 2023 22:02):    No growth to date.    Culture - Blood (collected 23 May 2023 18:10)  Source: .Blood Blood-Peripheral  Preliminary Report (24 May 2023 22:02):    No growth to date.        RADIOLOGY & ADDITIONAL TESTS:    Imaging Personally Reviewed:  Consultant(s) Notes Reviewed:    Care Discussed with Consultants/Other Providers:

## 2023-05-25 NOTE — DIETITIAN INITIAL EVALUATION ADULT - NSICDXPASTMEDICALHX_GEN_ALL_CORE_FT
Discussion/Summary   Tita,   I left a voicemail as well, but wanted you to know that you do have arthritis in your left shoulder.  I recommend you come in for an evaluation.  I think you would benefit from an injection of steroid into the joint.      Dr. Rendon        Verified Results  XR SHOULDER LT 3V 07Jun2018 12:50PM BLAKE RENDON     Test Name Result Flag Reference   XR SHOULDER LT 3V (Report)     Accession #    [id number]    EXAM: XR SHOULDER LT 3V    CLINICAL INDICATION: Shoulder pain.    COMPARISON: None.    FINDINGS AND IMPRESSION:    No acute fracture or malalignment. Degenerative changes left AC joint. Visualized soft tissues   and left lung are unremarkable.    I, VERONICA POLLOCK M.D., have reviewed the images and report and concur with these findings   interpreted by MILO WOLFE DO.    **** F I N A L ****    Transcribed By: ALEX   06/07/18 1:16 pm    Dictated By:      MILO CAMPUZANO DO    Electronically Reviewed and Approved By:      VERONICA WASHINGTON MD 06/07/18 1:48 pm       
PAST MEDICAL HISTORY:  DVT of upper extremity (deep vein thrombosis)     Former smoker     GIB (gastrointestinal bleeding)     H/O autoimmune hemolytic anemia     H/O hemolytic anemia     Hepatitis C virus     HLD (hyperlipidemia)     HTN     Knee pain, right     Non-Ischemic Cardiomyopathy now s/p transplant 2018    SVT (Supraventricular Tachycardia)

## 2023-05-25 NOTE — DIETITIAN INITIAL EVALUATION ADULT - PROBLEM SELECTOR PLAN 2
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 C donor (treated) 2/23/18 (post op course complicated by graft dysfunction treated by plasmapheresis, IVIG, and rituximab) currently on immunosuppressant     - C/w sulfamethoxazole-trimethoprim 400mg-80mg qd  - C/w Tacrolimus 0.5mg  5cap BID, check Tacrolimus level  - C/w valganciclovir 450mg qd   - Prednisone 40mg qd   - Folic Acid 1mg   - HF transplant consult to be called in AM

## 2023-05-25 NOTE — PROGRESS NOTE ADULT - ATTENDING COMMENTS
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 Continue PT. SW to assess for home care needs.   For heart transplant, he appears volume overloaded but diuresing well. Will check AlloSure (cell free DNA) to assess for rejection, and low threshold for endomyocardial biopsy. Continue Tacrolimus. Continue ASA, statin.   Hemolytic anemia--continue Prednisone. Plan for Rituxan once more clinically stable, will consult Hematology. Continue prophylaxis with Bactrim, Valcyte, Nystatin.

## 2023-05-25 NOTE — DIETITIAN INITIAL EVALUATION ADULT - ORAL INTAKE PTA/DIET HISTORY
Pt reports good PO intake, denies history of poor PO intake PTA. Reports not on any therapeutic restriction PTA. Pt reports getting foods from [employer].   Confirms NKFA/intolerance  Micronutrient/Other supplementation: Folic acid per H&P  Protein-energy supplementation: none

## 2023-05-25 NOTE — PATIENT PROFILE ADULT - CAREGIVER RELATION TO PATIENT
"    [facility], [place]    Progress Note - Pediatric Gastroenterology Service        Date of Admission:  2020    Assessment & Plan   Jeramie Wright is an adopted 6 week old ex 36 week male admitted on 9/18/202 with trisomy 21, h/o ASD, h/o VSD, h/o cholestatic jaundice, h/o esophageal varices/GI bleed, duodenal atresia s/p repair who initially presented with worsening abdominal distention and large fluid collection concerning for peritonitis, confirmed on paracentesis on 9/19. He was also found to have portal HTN. MRI showed atrophic pancreas and patent ductus venosus. Now with acholic stools. Picture is concerning for possible embryonic biliary atresia vs parenchymal liver disease. Repeat paracentesis and liver biopsy obtained on 9/21.      FEN/Renal  - donor breastmilk 2oz q3h, PO gavage. If no DBM, give neosure.   - D5NS at maintenance, hold when receiving albumin  - Albumin, furosemide, spironolactone as below.     ID  Bacterial peritonitis   Pt reported to have a simple fluid collection drained in [place] several weeks ago, but with ongoing ascites now found to have a large complex fluid collection along the left abdomen and pelvis; Paracentesis w/ 2212 WBC. GNR on gram stain. Worsening clinical status with increased work of breathing and falling blood pressures. Additionally, white count increased today from 22 to 27.  - Discontinue zosyn, will start vancomycin 20 mg/kg IV q8h and meropenem 30 mg/kg IV q8h  - Continue fluconazole 12mg/kg IV q24h  - ID consulted, appreciate recs   - General surgery consulted, appreciate recs   - Follow up cultures    Covid Exposure  HEPA filter not changed between previous patient, who was covid positive, and this patient for paracentesis and PICC placement. Risk deemed minimal by infection prevention and patient no longer in covid precautions, however, recommend he stays in \"hospital quarantine\" for 14 days with contact/droplet " precautions.  - COVID swab obtained today due to increased WOB and recent low risk exposure per ID recommendations. Negative. Will continue to monitor for signs/symptoms.     GI  Portal HTN  Ascites  Unclear whether etiology is vascular, hepatic, post-surgical. Echo without concern for cardiac etiology. Has patent ductus venosus, but should not necessarily cause HTN. No AV fistulas noted on MRI.   - Liver biopsy today  - s/p albumin + 0.25mg IV Lasix overnight, now scheduled q8h with 0.5mg Lasix   - Continue spironolactone 1 mg/kg PO BID  - Daily weights  - Strict Is and Os  - Tylenol q6h PO or rectal       H/o multiple transfusions, h/o GI bleed/esophageal varices  Resolved, s/p exploratory laparotomy on 8/10/20  - Continue lansoprazole 3mg daily  - Continue vitamin K 1mg qdaily      Cholestasis   On repeat hepatic panel, stable from recent hospitalization in [place] with elevated T bili and D bili. Possible genetic etiology of cholestasis, such as PFIC2. Now with acholic stools today  - Genetics on consult, appreciate recs; may obtain cholestasis panel at some point in the future pending liver biopsy results     - Continue ursodiol 40mg BID  - Continue Aquadeks daily  - Liver biopsy as above     Endo  Congential hypothyroidism   TSH at birth was 34.4, on 9/5 TSH/FT4 of 1.27/45.22; patient was started on 25mcg of levothyroxine; TSH/FT4 on admission is 8.78/1.84. NBS was positive for congenital hypothyroidism.  - Continue levothyroxine 25mcg daily     Cardiology  H/o large bidirectional PDA; spontaneous closure  H/o mild tricuspid and pulmonary insufficiency  H/o Small apical muscular bidirectional VSD  H/o moderate secundum ASD; spontaneous closure  Question of pulmonary hypertension on imaging  - Echo wnl but murmur appreciable on exam, continue to monitor.   - Cardiology consulted, appreciate recs      Resipiratory  Respiratory distress syndrome post-operatively Patient required oxygen support throughout his  hospitalization at the OSH post-operatively; he was discharged home on 0.1LPM, 100%. Increased WOB overnight, likely due to ascites. COVID negative.  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     Entered: Satya Spence MD 2020, 2:04 PM       The patient's care was discussed with the Attending Physician, Dr. Kerr.    Satya Spence MD  PGY-1, Pediatrics  Pager: [phone]  ______________________________________________________________________    Interval History   Overnight, pt had some tachypnea with max RR of 60 and had increased WOB with head bobbing, nasal flaring. O2 was increased to no effect and he was placed on 6L of high flow. Comfortable while sleeping, still with increased WOB while awake. He continued to sat well. Abdominal girth, scrotal edema, and CXR were stable. Albumin/lasix was given with subsequent increase in UOP. During the day on 9/21, blood pressure dipped to low 60s over high 30s. Labs were ordered to r/o sepsis. Otherwise, pain appears improved. Hungry.     Mom at bedside. Questions answered and concerns addressed. Mom very frustrated with back and forth about  COVID.     Data reviewed today: I reviewed all medications, new labs and imaging results over the last 24 hours.  Physical Exam   Vital Signs: Temp: 98.4  F (36.9  C) Temp src: Axillary BP: (!) 62/42 Pulse: 120   Resp: 28 SpO2: 100 % O2 Device: High Flow Nasal Cannula (HFNC)(for CPAP support) Oxygen Delivery: 6 LPM  Weight: 7 lbs 4.23 oz  General: Awake, alert, NAD, appears comfortable   HEENT: Normocephalic, atraumatic. Facial features typical of trisomy 21. No scleral icterus. No nasal discharge. Moist oral mucosa.  CV: Regular rate and rhythm. Normal S1, S2. III/VI systolic ejection murmur. No rubs or gallops. Cap refill <2 sec. 2+ distal pulses.   Respiratory: HFNC in place. No increased WOB. Lungs clear to auscultation bilaterally. No wheezing, rhonchi, or rales.   Abdomen: Soft, very distended abdomen, no obvious discomfort with palpation. Normoactive bowel sounds.  : Scrotal edema, improved from prior exam. Bag in place.   MSK/extremities: Moving all extremities. Normal ROM. No lower extremity edema.   Neuro: Alert and oriented, moves all extremities spontaneously, non focal    Skin: Midline laparotomy site, no drainage. No lesion on visualized portion of skin.    Data   Recent Labs   Lab 09/21/20  1255 09/21/20  0615 09/20/20  1800 09/20/20  0630  09/18/20  1750 09/18/20  1640   WBC  --  27.7*  --  22.1*  --   --  27.8*   HGB  --  10.6  --  10.3*  --   --  12.8   MCV  --  91*  --  95  --   --  91*   PLT  --  238  --  224  --   --  221   INR 1.60* 1.57*  --   --   --   --  1.37*   NA  --  142 146* 144*   < > 134 Canceled, Test credited   POTASSIUM  --  4.3 4.1 3.7   < > 6.4* Canceled, Test credited   CHLORIDE  --  111* 115* 111*   < > 104 Canceled, Test credited   CO2  --  27 24 21   < > 25 Canceled, Test credited   BUN  --  8 7 6   < > 5 Canceled, Test credited   CR  --  0.30 0.30 0.28   < > 0.31 Canceled, Test credited   ANIONGAP  --  4 7 12   < > 5 Canceled, Test credited   TERI  --  8.7 8.4* 8.4*   < >  8.8 Canceled, Test credited   GLC  --  92 92 128*   < > 72 Canceled, Test credited   ALBUMIN  --  2.9 2.6 2.9   < > 2.1* Canceled, Test credited   PROTTOTAL  --  4.5*  --  4.4*   < > 4.4* Canceled, Test credited   BILITOTAL  --  4.2*  --  4.2*   < > 5.6* Canceled, Test credited   ALKPHOS  --  138  --  146   < > 274 Canceled, Test credited   ALT  --  24  --  24   < > 38 Canceled, Test credited   AST  --  43  --  35   < > Unsatisfactory specimen - hemolyzed Canceled, Test credited   LIPASE  --   --   --   --   --  47 Canceled, Test credited    < > = values in this interval not displayed.      [employer] FAM[employer]

## 2023-05-25 NOTE — DIETITIAN INITIAL EVALUATION ADULT - ETIOLOGY
increased physiologic demand for nutrients Food and nutrition knowledge deficit concerning how to make nutrition-related changes

## 2023-05-25 NOTE — PROGRESS NOTE ADULT - ASSESSMENT
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  Patient is a poor historian, difficult to obtain history from him.    1.  Type 2 diabetes, exacerbated by high-dose steroid.  Patient is scheduled to receive prednisone 40 mg 4/25/2023, followed by prednisone 30 mg 5/25/2023.  Currently uncertain the steroid regimen.  Primary team to contact endocrinology regarding any changes in his steroid regimen.  Patient received Lantus 18 units at bedtime, with fasting glucose in 283 mg/dL.  Denies eating anything.  Nurse at bedside states that patient has been asking for cookies.  -Discussed with patient the importance of Carb consistent diet and exercise as tolerated.    -Recommend nutritional consultation  -Discussed glycemic goal of a1c <7% to prevent microvascular complications of diabetes mellitus and reduce the risk of macrovascular complications.   -Recommend annual podiatry and ophthalmology follow up.   -Glucose goal of 80-180mg/dl while in patient.   -Recommend Lantus 20 units at bedtime  -Recommend Admelog 26 units with breakfast, 10 units with lunch, 6 units with dinner  -Recommend to continue with moderate dose sliding scale before every meal and at bedtime.    Discharge recommendation: May need basal bolus, adding GLP-1 receptor agonist might be helpful for this patient    2.  Hyperlipidemia  LDL goal <70 mg/dL  Continue with atorvastatin 10 mg at bedtime    3.  Hypertension  BP goal <130/80  Management as per cardiology team

## 2023-05-25 NOTE — DIETITIAN INITIAL EVALUATION ADULT - PERTINENT MEDS FT
MEDICATIONS  (STANDING):  apixaban 5 milliGRAM(s) Oral every 12 hours  aspirin  chewable 81 milliGRAM(s) Oral daily  atorvastatin 10 milliGRAM(s) Oral at bedtime  dextrose 5%. 1000 milliLiter(s) (100 mL/Hr) IV Continuous <Continuous>  dextrose 5%. 1000 milliLiter(s) (50 mL/Hr) IV Continuous <Continuous>  dextrose 50% Injectable 25 Gram(s) IV Push once  dextrose 50% Injectable 12.5 Gram(s) IV Push once  dextrose 50% Injectable 25 Gram(s) IV Push once  febuxostat 40 milliGRAM(s) Oral daily  folic acid 1 milliGRAM(s) Oral daily  furosemide   Injectable 40 milliGRAM(s) IV Push two times a day  glucagon  Injectable 1 milliGRAM(s) IntraMuscular once  insulin glargine Injectable (LANTUS) 18 Unit(s) SubCutaneous at bedtime  insulin lispro (ADMELOG) corrective regimen sliding scale   SubCutaneous three times a day before meals  insulin lispro (ADMELOG) corrective regimen sliding scale   SubCutaneous at bedtime  insulin lispro Injectable (ADMELOG) 26 Unit(s) SubCutaneous before breakfast  insulin lispro Injectable (ADMELOG) 10 Unit(s) SubCutaneous before lunch  insulin lispro Injectable (ADMELOG) 6 Unit(s) SubCutaneous before dinner  insulin regular  human recombinant. 3 Unit(s) SubCutaneous once  metoprolol succinate  milliGRAM(s) Oral daily  nystatin    Suspension [id number] Unit(s) Oral three times a day  pantoprazole    Tablet 40 milliGRAM(s) Oral before breakfast  predniSONE   Tablet 30 milliGRAM(s) Oral daily  senna 2 Tablet(s) Oral at bedtime  tacrolimus 2.5 milliGRAM(s) Oral two times a day  trimethoprim   80 mG/sulfamethoxazole 400 mG 1 Tablet(s) Oral every 24 hours  valGANciclovir 450 milliGRAM(s) Oral daily    MEDICATIONS  (PRN):  acetaminophen     Tablet .. 650 milliGRAM(s) Oral every 6 hours PRN Temp greater or equal to 38C (100.4F), Mild Pain (1 - 3)  aluminum hydroxide/magnesium hydroxide/simethicone Suspension 30 milliLiter(s) Oral every 4 hours PRN Dyspepsia  dextrose Oral Gel 15 Gram(s) Oral once PRN Blood Glucose LESS THAN 70 milliGRAM(s)/deciliter  melatonin 3 milliGRAM(s) Oral at bedtime PRN Insomnia  ondansetron Injectable 4 milliGRAM(s) IV Push every 8 hours PRN Nausea and/or Vomiting

## 2023-05-25 NOTE — DIETITIAN INITIAL EVALUATION ADULT - ENERGY INTAKE
Adequate (%) Pt reports good PO intake >75% in hospital. RD observed >75% breakfast consumed upon interview. Pt is also requesting snacks upon RD visit.

## 2023-05-25 NOTE — DIETITIAN INITIAL EVALUATION ADULT - REASON FOR ADMISSION
Heart failure    Per chart: "73y M PMH  HTN, HLD, Nonischemic cardiomyopathy, chronic systolic heart failure s/p HM2 LVAD (6/2017), s/p heart transplant from Hep. C donor (treated) 2/23/18 (post op course complicated by graft dysfunction treated by plasmapheresis, IVIG, and rituximab), being sent from HF clinic for worsening sx. Has been experiencing worsening SOB, increased LE edema, weakness and near syncopal episode. Pt has experienced wt gain and have been requiring increasing doses of diuretics at home. He had near syncopal episode while at the HF clinic today in presence of Raymond Beebe no fall, injury, head trauma, LOC. Given worsening sx, was sent for further management and Iv diuretics. Denies CP, palpitation, N/V/D, fever, cough, chills, dizziness, abm pain"

## 2023-05-25 NOTE — DIETITIAN INITIAL EVALUATION ADULT - PROBLEM SELECTOR PLAN 4
Dx'd after he was admitted to [facility] on 1/4/23 for hemolytic anemia with hgb 6.5.  Was treated with pulse Decadron and IVIG. He received 1 unit of packed red blood cells and then prednisone was started. He was discharged on 1/7/23. Was in remission with well compensated hemolytic anemia on low dose prednisone, but relapsed after tapered to 3 mg daily.   Was re-started on high dose prednisone and responding currently on Prednisone 40mg qd     - H&H appears stable, will trend cbc   - C/w Prednisone 40mg qd    - heme onc/consult ( emailed)

## 2023-05-25 NOTE — PROGRESS NOTE ADULT - SUBJECTIVE AND OBJECTIVE BOX
seen earlier today     Chief Complaint: Type 2 Diabetes Mellitus     INTERVAL HX:      Review of Systems:  General: As above  Cardiovascular: No chest pain  Respiratory: No SOB  GI: No nausea, vomiting  Endocrine: no  S&Sx of hypoglycemia    Allergies    No Known Allergies    Intolerances      MEDICATIONS  (STANDING):  apixaban 5 milliGRAM(s) Oral every 12 hours  aspirin  chewable 81 milliGRAM(s) Oral daily  atorvastatin 10 milliGRAM(s) Oral at bedtime  dextrose 5%. 1000 milliLiter(s) (50 mL/Hr) IV Continuous <Continuous>  dextrose 5%. 1000 milliLiter(s) (100 mL/Hr) IV Continuous <Continuous>  dextrose 50% Injectable 25 Gram(s) IV Push once  dextrose 50% Injectable 12.5 Gram(s) IV Push once  dextrose 50% Injectable 25 Gram(s) IV Push once  febuxostat 40 milliGRAM(s) Oral daily  folic acid 1 milliGRAM(s) Oral daily  furosemide   Injectable 40 milliGRAM(s) IV Push two times a day  glucagon  Injectable 1 milliGRAM(s) IntraMuscular once  insulin glargine Injectable (LANTUS) 18 Unit(s) SubCutaneous at bedtime  insulin lispro (ADMELOG) corrective regimen sliding scale   SubCutaneous three times a day before meals  insulin lispro (ADMELOG) corrective regimen sliding scale   SubCutaneous at bedtime  insulin lispro Injectable (ADMELOG) 26 Unit(s) SubCutaneous before breakfast  insulin lispro Injectable (ADMELOG) 10 Unit(s) SubCutaneous before lunch  insulin lispro Injectable (ADMELOG) 6 Unit(s) SubCutaneous before dinner  insulin regular  human recombinant. 3 Unit(s) SubCutaneous once  metoprolol succinate  milliGRAM(s) Oral daily  nystatin    Suspension 942446 Unit(s) Oral three times a day  pantoprazole    Tablet 40 milliGRAM(s) Oral before breakfast  predniSONE   Tablet 30 milliGRAM(s) Oral daily  senna 2 Tablet(s) Oral at bedtime  tacrolimus 2.5 milliGRAM(s) Oral two times a day  trimethoprim   80 mG/sulfamethoxazole 400 mG 1 Tablet(s) Oral every 24 hours  valGANciclovir 450 milliGRAM(s) Oral daily      atorvastatin   10 milliGRAM(s) Oral (05-24-23 @ 22:24)    febuxostat   40 milliGRAM(s) Oral (05-24-23 @ 12:08)    insulin glargine Injectable (LANTUS)   18 Unit(s) SubCutaneous (05-24-23 @ 22:21)    insulin glargine Injectable (LANTUS)   18 Unit(s) SubCutaneous (05-24-23 @ 00:32)    insulin lispro (ADMELOG) corrective regimen sliding scale   3 Unit(s) SubCutaneous (05-25-23 @ 09:03)   2 Unit(s) SubCutaneous (05-24-23 @ 18:00)   2 Unit(s) SubCutaneous (05-24-23 @ 12:07)   1 Unit(s) SubCutaneous (05-24-23 @ 07:55)    insulin lispro (ADMELOG) corrective regimen sliding scale   4 Unit(s) SubCutaneous (05-24-23 @ 00:35)    insulin lispro Injectable (ADMELOG)   26 Unit(s) SubCutaneous (05-25-23 @ 09:03)   26 Unit(s) SubCutaneous (05-24-23 @ 07:54)    insulin lispro Injectable (ADMELOG)   10 Unit(s) SubCutaneous (05-24-23 @ 12:06)    insulin lispro Injectable (ADMELOG)   6 Unit(s) SubCutaneous (05-24-23 @ 17:59)    insulin regular  human recombinant.   5 Unit(s) SubCutaneous (05-24-23 @ 03:37)    predniSONE   Tablet   30 milliGRAM(s) Oral (05-25-23 @ 05:47)   30 milliGRAM(s) Oral (05-24-23 @ 18:05)    predniSONE   Tablet   40 milliGRAM(s) Oral (05-24-23 @ 05:47)        PHYSICAL EXAM:  VITALS: T(C): 36.5 (05-25-23 @ 04:16)  T(F): 97.7 (05-25-23 @ 04:16), Max: 98.1 (05-24-23 @ 19:22)  HR: 97 (05-25-23 @ 04:16) (97 - 109)  BP: 134/87 (05-25-23 @ 11:12) (99/75 - 134/87)  RR:  (17 - 19)  SpO2:  (96% - 99%)  Wt(kg): --  GENERAL: in NAD  Respiratory: Respirations unlabored   Extremities: Warm, no edema  NEURO: Alert , appropriate     LABS:  POCT Blood Glucose.: 283 mg/dL (05-25-23 @ 08:56)  POCT Blood Glucose.: 184 mg/dL (05-24-23 @ 21:33)  POCT Blood Glucose.: 231 mg/dL (05-24-23 @ 17:02)  POCT Blood Glucose.: 248 mg/dL (05-24-23 @ 11:18)  POCT Blood Glucose.: 180 mg/dL (05-24-23 @ 07:42)  POCT Blood Glucose.: 291 mg/dL (05-24-23 @ 05:46)  POCT Blood Glucose.: 433 mg/dL (05-24-23 @ 04:11)  POCT Blood Glucose.: 468 mg/dL (05-24-23 @ 03:33)  POCT Blood Glucose.: 463 mg/dL (05-24-23 @ 03:33)  POCT Blood Glucose.: 545 mg/dL (05-24-23 @ 02:31)  POCT Blood Glucose.: 512 mg/dL (05-24-23 @ 02:29)  POCT Blood Glucose.: 487 mg/dL (05-24-23 @ 00:28)  POCT Blood Glucose.: 479 mg/dL (05-24-23 @ 00:25)                          10.8   12.69 )-----------( 190      ( 24 May 2023 06:00 )             34.7     05-24    141  |  99  |  37<H>  ----------------------------<  299<H>  5.8<H>   |  27  |  1.82<H>    Ca    8.9      24 May 2023 06:00  Mg     1.9     05-25    TPro  5.7<L>  /  Alb  3.9  /  TBili  0.7  /  DBili  x   /  AST  16  /  ALT  25  /  AlkPhos  61  05-23        Thyroid Function Tests:      A1C with Estimated Average Glucose Result: 8.6 % (05-24-23 @ 06:00)    Estimated Average Glucose: 200 mg/dL (05-24-23 @ 06:00)        Diet, Regular:   Consistent Carbohydrate No Snacks (CSTCHO)  DASH/TLC Sodium & Cholesterol Restricted (DASH) (05-23-23 @ 20:36) [Active]              seen earlier today     Chief Complaint: Type 2 Diabetes Mellitus     INTERVAL HX: Patient now on predisone 30mg once daily.  Patient denies eating anything after dinner, fasting glucose still elevated.  Patient was asking RN for cookies before.      Review of Systems:  General: As above  Cardiovascular: No chest pain  Respiratory: No SOB  GI: No nausea, vomiting  Endocrine: no  S&Sx of hypoglycemia    Allergies    No Known Allergies    Intolerances      MEDICATIONS  (STANDING):  apixaban 5 milliGRAM(s) Oral every 12 hours  aspirin  chewable 81 milliGRAM(s) Oral daily  atorvastatin 10 milliGRAM(s) Oral at bedtime  dextrose 5%. 1000 milliLiter(s) (50 mL/Hr) IV Continuous <Continuous>  dextrose 5%. 1000 milliLiter(s) (100 mL/Hr) IV Continuous <Continuous>  dextrose 50% Injectable 25 Gram(s) IV Push once  dextrose 50% Injectable 12.5 Gram(s) IV Push once  dextrose 50% Injectable 25 Gram(s) IV Push once  febuxostat 40 milliGRAM(s) Oral daily  folic acid 1 milliGRAM(s) Oral daily  furosemide   Injectable 40 milliGRAM(s) IV Push two times a day  glucagon  Injectable 1 milliGRAM(s) IntraMuscular once  insulin glargine Injectable (LANTUS) 18 Unit(s) SubCutaneous at bedtime  insulin lispro (ADMELOG) corrective regimen sliding scale   SubCutaneous three times a day before meals  insulin lispro (ADMELOG) corrective regimen sliding scale   SubCutaneous at bedtime  insulin lispro Injectable (ADMELOG) 26 Unit(s) SubCutaneous before breakfast  insulin lispro Injectable (ADMELOG) 10 Unit(s) SubCutaneous before lunch  insulin lispro Injectable (ADMELOG) 6 Unit(s) SubCutaneous before dinner  insulin regular  human recombinant. 3 Unit(s) SubCutaneous once  metoprolol succinate  milliGRAM(s) Oral daily  nystatin    Suspension [id number] Unit(s) Oral three times a day  pantoprazole    Tablet 40 milliGRAM(s) Oral before breakfast  predniSONE   Tablet 30 milliGRAM(s) Oral daily  senna 2 Tablet(s) Oral at bedtime  tacrolimus 2.5 milliGRAM(s) Oral two times a day  trimethoprim   80 mG/sulfamethoxazole 400 mG 1 Tablet(s) Oral every 24 hours  valGANciclovir 450 milliGRAM(s) Oral daily      atorvastatin   10 milliGRAM(s) Oral (05-24-23 @ 22:24)    febuxostat   40 milliGRAM(s) Oral (05-24-23 @ 12:08)    insulin glargine Injectable (LANTUS)   18 Unit(s) SubCutaneous (05-24-23 @ 22:21)    insulin glargine Injectable (LANTUS)   18 Unit(s) SubCutaneous (05-24-23 @ 00:32)    insulin lispro (ADMELOG) corrective regimen sliding scale   3 Unit(s) SubCutaneous (05-25-23 @ 09:03)   2 Unit(s) SubCutaneous (05-24-23 @ 18:00)   2 Unit(s) SubCutaneous (05-24-23 @ 12:07)   1 Unit(s) SubCutaneous (05-24-23 @ 07:55)    insulin lispro (ADMELOG) corrective regimen sliding scale   4 Unit(s) SubCutaneous (05-24-23 @ 00:35)    insulin lispro Injectable (ADMELOG)   26 Unit(s) SubCutaneous (05-25-23 @ 09:03)   26 Unit(s) SubCutaneous (05-24-23 @ 07:54)    insulin lispro Injectable (ADMELOG)   10 Unit(s) SubCutaneous (05-24-23 @ 12:06)    insulin lispro Injectable (ADMELOG)   6 Unit(s) SubCutaneous (05-24-23 @ 17:59)    insulin regular  human recombinant.   5 Unit(s) SubCutaneous (05-24-23 @ 03:37)    predniSONE   Tablet   30 milliGRAM(s) Oral (05-25-23 @ 05:47)   30 milliGRAM(s) Oral (05-24-23 @ 18:05)    predniSONE   Tablet   40 milliGRAM(s) Oral (05-24-23 @ 05:47)        PHYSICAL EXAM:  VITALS: T(C): 36.5 (05-25-23 @ 04:16)  T(F): 97.7 (05-25-23 @ 04:16), Max: 98.1 (05-24-23 @ 19:22)  HR: 97 (05-25-23 @ 04:16) (97 - 109)  BP: 134/87 (05-25-23 @ 11:12) (99/75 - 134/87)  RR:  (17 - 19)  SpO2:  (96% - 99%)  Wt(kg): --  GENERAL: in NAD  Respiratory: Respirations unlabored   Extremities: Warm, no edema  NEURO: Alert , appropriate     LABS:  POCT Blood Glucose.: 283 mg/dL (05-25-23 @ 08:56)  POCT Blood Glucose.: 184 mg/dL (05-24-23 @ 21:33)  POCT Blood Glucose.: 231 mg/dL (05-24-23 @ 17:02)  POCT Blood Glucose.: 248 mg/dL (05-24-23 @ 11:18)  POCT Blood Glucose.: 180 mg/dL (05-24-23 @ 07:42)  POCT Blood Glucose.: 291 mg/dL (05-24-23 @ 05:46)  POCT Blood Glucose.: 433 mg/dL (05-24-23 @ 04:11)  POCT Blood Glucose.: 468 mg/dL (05-24-23 @ 03:33)  POCT Blood Glucose.: 463 mg/dL (05-24-23 @ 03:33)  POCT Blood Glucose.: 545 mg/dL (05-24-23 @ 02:31)  POCT Blood Glucose.: 512 mg/dL (05-24-23 @ 02:29)  POCT Blood Glucose.: 487 mg/dL (05-24-23 @ 00:28)  POCT Blood Glucose.: 479 mg/dL (05-24-23 @ 00:25)                          10.8   12.69 )-----------( 190      ( 24 May 2023 06:00 )             34.7     05-24    141  |  99  |  37<H>  ----------------------------<  299<H>  5.8<H>   |  27  |  1.82<H>    Ca    8.9      24 May 2023 06:00  Mg     1.9     05-25    TPro  5.7<L>  /  Alb  3.9  /  TBili  0.7  /  DBili  x   /  AST  16  /  ALT  25  /  AlkPhos  61  05-23        Thyroid Function Tests:      A1C with Estimated Average Glucose Result: 8.6 % (05-24-23 @ 06:00)    Estimated Average Glucose: 200 mg/dL (05-24-23 @ 06:00)        Diet, Regular:   Consistent Carbohydrate No Snacks (CSTCHO)  DASH/TLC Sodium & Cholesterol Restricted (DASH) (05-23-23 @ 20:36) [Active]

## 2023-05-25 NOTE — DIETITIAN INITIAL EVALUATION ADULT - ADD RECOMMEND
1. Monitor PO intake, GI tolerance, skin integrity, labs, weight, and bowel movement regularity.   2. Will continue to honor food and beverage preferences within diet restriction of patient in an effort to maximize level of nutrient intake.  3. Assist with meals PRN.  4. Reinforce nutrition education as able.

## 2023-05-25 NOTE — PROGRESS NOTE ADULT - PROBLEM SELECTOR PLAN 4
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

## 2023-05-25 NOTE — DIETITIAN INITIAL EVALUATION ADULT - NSFNSADHERENCEPTAFT_GEN_A_CORE
Pt is on Admelog and Glargine to manage blood glucose PTA. Most recent HbA1c of 8.6H (5/24) indicates poor glycemic control.

## 2023-05-25 NOTE — PROGRESS NOTE ADULT - ASSESSMENT
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 on 3/4 with new onset of chest tightness, found to be in Aflutter/fib.  Course c/b by URI w/ human metapneumovirus c/f PNA. He presents from clinic due to leg edema and two mechanical falls. He is volume overloaded on exam and recent echo shows Ef decrease from 72 to 5    Cardiac studies:  TTE: 5/19: LVIDd 4.1, EF 51%" Compared to the transthoracic echocardiogram performed on 1/5/2023 There is a decrease in LVEF. On the previous study, the LV was hyperdynamic. There is more turbulence seen in the RV compared to the previous. RV size is mildly dilated but function appears similar.  TTE 3/3/23: LVIDd 3.6, EF 50-55%, concentric remodeling

## 2023-05-25 NOTE — PATIENT PROFILE ADULT - FALL HARM RISK - HARM RISK INTERVENTIONS

## 2023-05-25 NOTE — DIETITIAN INITIAL EVALUATION ADULT - OTHER CALCULATIONS
Fluid needs deferred to team. Energy and protein needs based on IBW of 148lb in consideration of dx HF.

## 2023-05-25 NOTE — PROGRESS NOTE ADULT - PROBLEM SELECTOR PLAN 1
-Exam with volume overload and TTE with lower EF  -Diurese with LAsix 40 IV BID for one more day  -Transplant coordinators will send Allosure   -monitor creatinine and electrolytes  -strict I and O  -daily standing weights.

## 2023-05-25 NOTE — DIETITIAN INITIAL EVALUATION ADULT - PROBLEM SELECTOR PLAN 1
Pt  w/significant cardiac hx presents w/ worsening HF sx ( SOB, LE edema, wt gain)  In HF office today noted to have near syncopal episode so sent into ED for further management    - EKG: NSR w/RBBB  - cbc & chem stable, Trop neg,   - CXR: Rt basilar atelectasis, trace b/l pleural effusion   - ED: given IV lasix 40mg x1  - c/w IV lasix  40mg qd   - I&O, daily wt   - telemetry   - Echo from 5/19/23 reviewed: LVSF mildly decreased, EF 51%  - Cardio/HF consult to be called in AM , follows Dr Campbell   - C/w home meds: Metoroprolol suc ER 200mg , Losartan 25mg, Statin  - Had near syncopal episode in cardio office, CT reviewed and neg

## 2023-05-25 NOTE — DIETITIAN INITIAL EVALUATION ADULT - NS FNS DIET ORDER
Diet, Regular:   Consistent Carbohydrate {No Snacks} (CSTCHO)  DASH/TLC {Sodium & Cholesterol Restricted} (DASH) (05-23-23 @ 20:36) [Active]

## 2023-05-25 NOTE — DIETITIAN INITIAL EVALUATION ADULT - OTHER INFO
-- Most recent Lab 5/24: Na 141, K 5.8H, Cl 99, BUN 37H, Cr 1.82H, Ca 8.9, GFR 39L. Noted w/ hyperkalemia and NORMAN, will continue to trend lab.   -- Pt is on SSI (Lispro, Lantus, Humulin) to regulate blood glucose.   -- Pt s/p heart transplant 2018, on Valganciclovir, Tacrolimus, and Prednisone.   -- Pt is on trimethoprim-sulfamethoxazole, Lasix, Simethicone, Atorvastatin, Metopolol, Zofran, Protonix, folic acid, Senna.

## 2023-05-25 NOTE — DIETITIAN INITIAL EVALUATION ADULT - PERTINENT LABORATORY DATA
05-24    141  |  99  |  37<H>  ----------------------------<  299<H>  5.8<H>   |  27  |  1.82<H>    Ca    8.9      24 May 2023 06:00  Mg     1.9     05-25    TPro  5.7<L>  /  Alb  3.9  /  TBili  0.7  /  DBili  x   /  AST  16  /  ALT  25  /  AlkPhos  61  05-23  A1C with Estimated Average Glucose Result: 8.6 % (05-24-23 @ 06:00)  A1C with Estimated Average Glucose Result: 4.7 % (02-17-23 @ 11:59)    CAPILLARY BLOOD GLUCOSE  POCT Blood Glucose.: 283 mg/dL (25 May 2023 08:56)  POCT Blood Glucose.: 184 mg/dL (24 May 2023 21:33)  POCT Blood Glucose.: 231 mg/dL (24 May 2023 17:02)

## 2023-05-25 NOTE — DIETITIAN INITIAL EVALUATION ADULT - REASON INDICATOR FOR ASSESSMENT
Pt seen for consult for education on HF. Information obtained from pt, RN, electronic medical record. Chart reviewed, events noted.

## 2023-05-26 LAB
ANION GAP SERPL CALC-SCNC: 16 MMOL/L — SIGNIFICANT CHANGE UP (ref 5–17)
BUN SERPL-MCNC: 58 MG/DL — HIGH (ref 7–23)
CALCIUM SERPL-MCNC: 8.9 MG/DL — SIGNIFICANT CHANGE UP (ref 8.4–10.5)
CHLORIDE SERPL-SCNC: 98 MMOL/L — SIGNIFICANT CHANGE UP (ref 96–108)
CO2 SERPL-SCNC: 25 MMOL/L — SIGNIFICANT CHANGE UP (ref 22–31)
CREAT SERPL-MCNC: 2.17 MG/DL — HIGH (ref 0.5–1.3)
EGFR: 31 ML/MIN/1.73M2 — LOW
GLUCOSE BLDC GLUCOMTR-MCNC: 179 MG/DL — HIGH (ref 70–99)
GLUCOSE BLDC GLUCOMTR-MCNC: 308 MG/DL — HIGH (ref 70–99)
GLUCOSE BLDC GLUCOMTR-MCNC: 333 MG/DL — HIGH (ref 70–99)
GLUCOSE BLDC GLUCOMTR-MCNC: 353 MG/DL — HIGH (ref 70–99)
GLUCOSE BLDC GLUCOMTR-MCNC: 363 MG/DL — HIGH (ref 70–99)
GLUCOSE BLDC GLUCOMTR-MCNC: 431 MG/DL — HIGH (ref 70–99)
GLUCOSE SERPL-MCNC: 153 MG/DL — HIGH (ref 70–99)
LDH SERPL L TO P-CCNC: 556 U/L — HIGH (ref 50–242)
MAGNESIUM SERPL-MCNC: 1.9 MG/DL — SIGNIFICANT CHANGE UP (ref 1.6–2.6)
MAGNESIUM SERPL-MCNC: 2 MG/DL — SIGNIFICANT CHANGE UP (ref 1.6–2.6)
PHOSPHATE SERPL-MCNC: 3.9 MG/DL — SIGNIFICANT CHANGE UP (ref 2.5–4.5)
POTASSIUM SERPL-MCNC: 5.1 MMOL/L — SIGNIFICANT CHANGE UP (ref 3.5–5.3)
POTASSIUM SERPL-SCNC: 5.1 MMOL/L — SIGNIFICANT CHANGE UP (ref 3.5–5.3)
RBC # BLD: 3.38 M/UL — LOW (ref 4.2–5.8)
RETICS #: 120.3 K/UL — SIGNIFICANT CHANGE UP (ref 25–125)
RETICS/RBC NFR: 3.6 % — HIGH (ref 0.5–2.5)
SODIUM SERPL-SCNC: 139 MMOL/L — SIGNIFICANT CHANGE UP (ref 135–145)
TACROLIMUS SERPL-MCNC: 5.7 NG/ML — SIGNIFICANT CHANGE UP

## 2023-05-26 PROCEDURE — 99232 SBSQ HOSP IP/OBS MODERATE 35: CPT

## 2023-05-26 PROCEDURE — 99233 SBSQ HOSP IP/OBS HIGH 50: CPT

## 2023-05-26 RX ORDER — INSULIN LISPRO 100/ML
4 VIAL (ML) SUBCUTANEOUS
Refills: 0 | Status: DISCONTINUED | OUTPATIENT
Start: 2023-05-26 | End: 2023-05-26

## 2023-05-26 RX ORDER — INSULIN LISPRO 100/ML
12 VIAL (ML) SUBCUTANEOUS
Refills: 0 | Status: DISCONTINUED | OUTPATIENT
Start: 2023-05-27 | End: 2023-05-27

## 2023-05-26 RX ORDER — INSULIN GLARGINE 100 [IU]/ML
20 INJECTION, SOLUTION SUBCUTANEOUS AT BEDTIME
Refills: 0 | Status: DISCONTINUED | OUTPATIENT
Start: 2023-05-26 | End: 2023-05-29

## 2023-05-26 RX ORDER — INSULIN LISPRO 100/ML
4 VIAL (ML) SUBCUTANEOUS
Refills: 0 | Status: DISCONTINUED | OUTPATIENT
Start: 2023-05-26 | End: 2023-05-27

## 2023-05-26 RX ORDER — INSULIN LISPRO 100/ML
29 VIAL (ML) SUBCUTANEOUS
Refills: 0 | Status: DISCONTINUED | OUTPATIENT
Start: 2023-05-27 | End: 2023-05-27

## 2023-05-26 RX ORDER — FUROSEMIDE 40 MG
40 TABLET ORAL DAILY
Refills: 0 | Status: DISCONTINUED | OUTPATIENT
Start: 2023-05-27 | End: 2023-05-27

## 2023-05-26 RX ORDER — INSULIN LISPRO 100/ML
12 VIAL (ML) SUBCUTANEOUS
Refills: 0 | Status: DISCONTINUED | OUTPATIENT
Start: 2023-05-26 | End: 2023-05-26

## 2023-05-26 RX ADMIN — PANTOPRAZOLE SODIUM 40 MILLIGRAM(S): 20 TABLET, DELAYED RELEASE ORAL at 05:28

## 2023-05-26 RX ADMIN — Medication 200 MILLIGRAM(S): at 05:28

## 2023-05-26 RX ADMIN — Medication 4: at 12:21

## 2023-05-26 RX ADMIN — ATORVASTATIN CALCIUM 10 MILLIGRAM(S): 80 TABLET, FILM COATED ORAL at 21:46

## 2023-05-26 RX ADMIN — SENNA PLUS 2 TABLET(S): 8.6 TABLET ORAL at 21:46

## 2023-05-26 RX ADMIN — APIXABAN 5 MILLIGRAM(S): 2.5 TABLET, FILM COATED ORAL at 05:33

## 2023-05-26 RX ADMIN — Medication 30 MILLIGRAM(S): at 05:28

## 2023-05-26 RX ADMIN — TACROLIMUS 2.5 MILLIGRAM(S): 5 CAPSULE ORAL at 09:36

## 2023-05-26 RX ADMIN — Medication 500000 UNIT(S): at 05:30

## 2023-05-26 RX ADMIN — APIXABAN 5 MILLIGRAM(S): 2.5 TABLET, FILM COATED ORAL at 17:39

## 2023-05-26 RX ADMIN — Medication 500000 UNIT(S): at 14:48

## 2023-05-26 RX ADMIN — Medication 1 TABLET(S): at 21:46

## 2023-05-26 RX ADMIN — Medication 1: at 08:34

## 2023-05-26 RX ADMIN — Medication 10 UNIT(S): at 12:21

## 2023-05-26 RX ADMIN — Medication 500000 UNIT(S): at 21:45

## 2023-05-26 RX ADMIN — Medication 1 MILLIGRAM(S): at 12:20

## 2023-05-26 RX ADMIN — Medication 26 UNIT(S): at 08:34

## 2023-05-26 RX ADMIN — FEBUXOSTAT 40 MILLIGRAM(S): 40 TABLET ORAL at 12:20

## 2023-05-26 RX ADMIN — Medication 40 MILLIGRAM(S): at 05:29

## 2023-05-26 RX ADMIN — INSULIN GLARGINE 20 UNIT(S): 100 INJECTION, SOLUTION SUBCUTANEOUS at 21:49

## 2023-05-26 RX ADMIN — Medication 81 MILLIGRAM(S): at 12:20

## 2023-05-26 RX ADMIN — Medication 4 UNIT(S): at 17:40

## 2023-05-26 RX ADMIN — Medication 5: at 17:39

## 2023-05-26 RX ADMIN — VALGANCICLOVIR 450 MILLIGRAM(S): 450 TABLET, FILM COATED ORAL at 12:20

## 2023-05-26 RX ADMIN — TACROLIMUS 2.5 MILLIGRAM(S): 5 CAPSULE ORAL at 21:45

## 2023-05-26 RX ADMIN — Medication 2: at 21:49

## 2023-05-26 NOTE — PROGRESS NOTE ADULT - SUBJECTIVE AND OBJECTIVE BOX
Odilon Burgess MD    Patient is a 73y old  Male who presents with a chief complaint of near syncope, worsening HF sx (25 May 2023 12:29)        SUBJECTIVE / OVERNIGHT EVENTS: Patient seen and examined. No new events/complaints  TELEMETRY: SR 's      MEDICATIONS  (STANDING):  apixaban 5 milliGRAM(s) Oral every 12 hours  aspirin  chewable 81 milliGRAM(s) Oral daily  atorvastatin 10 milliGRAM(s) Oral at bedtime  dextrose 5%. 1000 milliLiter(s) (50 mL/Hr) IV Continuous <Continuous>  dextrose 5%. 1000 milliLiter(s) (100 mL/Hr) IV Continuous <Continuous>  dextrose 50% Injectable 25 Gram(s) IV Push once  dextrose 50% Injectable 12.5 Gram(s) IV Push once  dextrose 50% Injectable 25 Gram(s) IV Push once  febuxostat 40 milliGRAM(s) Oral daily  folic acid 1 milliGRAM(s) Oral daily  glucagon  Injectable 1 milliGRAM(s) IntraMuscular once  insulin glargine Injectable (LANTUS) 18 Unit(s) SubCutaneous at bedtime  insulin lispro (ADMELOG) corrective regimen sliding scale   SubCutaneous three times a day before meals  insulin lispro (ADMELOG) corrective regimen sliding scale   SubCutaneous at bedtime  insulin lispro Injectable (ADMELOG) 26 Unit(s) SubCutaneous before breakfast  insulin lispro Injectable (ADMELOG) 10 Unit(s) SubCutaneous before lunch  insulin lispro Injectable (ADMELOG) 6 Unit(s) SubCutaneous before dinner  insulin regular  human recombinant. 3 Unit(s) SubCutaneous once  metoprolol succinate  milliGRAM(s) Oral daily  nystatin    Suspension [id number] Unit(s) Oral three times a day  pantoprazole    Tablet 40 milliGRAM(s) Oral before breakfast  predniSONE   Tablet 30 milliGRAM(s) Oral daily  senna 2 Tablet(s) Oral at bedtime  tacrolimus 2.5 milliGRAM(s) Oral two times a day  trimethoprim   80 mG/sulfamethoxazole 400 mG 1 Tablet(s) Oral every 24 hours  valGANciclovir 450 milliGRAM(s) Oral daily    MEDICATIONS  (PRN):  acetaminophen     Tablet .. 650 milliGRAM(s) Oral every 6 hours PRN Temp greater or equal to 38C (100.4F), Mild Pain (1 - 3)  aluminum hydroxide/magnesium hydroxide/simethicone Suspension 30 milliLiter(s) Oral every 4 hours PRN Dyspepsia  dextrose Oral Gel 15 Gram(s) Oral once PRN Blood Glucose LESS THAN 70 milliGRAM(s)/deciliter  melatonin 3 milliGRAM(s) Oral at bedtime PRN Insomnia  ondansetron Injectable 4 milliGRAM(s) IV Push every 8 hours PRN Nausea and/or Vomiting      Vital Signs Last 24 Hrs  T(C): 36.4 (26 May 2023 05:25), Max: 36.4 (25 May 2023 14:15)  T(F): 97.6 (26 May 2023 05:25), Max: 97.6 (25 May 2023 14:15)  HR: 99 (26 May 2023 05:25) (96 - 100)  BP: 127/87 (26 May 2023 05:25) (105/78 - 127/87)  BP(mean): --  RR: 18 (26 May 2023 05:25) (17 - 18)  SpO2: 99% (26 May 2023 05:25) (96% - 99%)    Parameters below as of 26 May 2023 05:25  Patient On (Oxygen Delivery Method): room air      CAPILLARY BLOOD GLUCOSE      POCT Blood Glucose.: 308 mg/dL (26 May 2023 12:18)  POCT Blood Glucose.: 179 mg/dL (26 May 2023 07:46)  POCT Blood Glucose.: 98 mg/dL (25 May 2023 21:54)  POCT Blood Glucose.: 160 mg/dL (25 May 2023 16:49)    I&O's Summary    25 May 2023 07:01  -  26 May 2023 07:00  --------------------------------------------------------  IN: 960 mL / OUT: 1450 mL / NET: -490 mL    26 May 2023 07:01  -  26 May 2023 12:39  --------------------------------------------------------  IN: 360 mL / OUT: 800 mL / NET: -440 mL          PHYSICAL EXAM  GENERAL: NAD, well-developed  HEAD:  Atraumatic, normocephalic  EYES: EOMI, PERRLA, conjunctiva and sclera clear  CHEST/LUNG: Bibasilar rales; no wheezes  HEART: +S1+S2, regular rate and rhythm, 3/6 BRENT  ABDOMEN: Soft, nontender, nondistended; bowel sounds present  EXTREMITIES:  2+ peripheral pulses; no clubbing, cyanosis; 2+ pitting edema b/l LEs  PSYCH: AAOx3      LABS:    05-26    139  |  98  |  58<H>  ----------------------------<  153<H>  5.1   |  25  |  2.17<H>    Ca    8.9      26 May 2023 09:26  Phos  3.9     05-26  Mg     1.9     05-26                Culture - Blood (collected 23 May 2023 18:20)  Source: .Blood Blood-Peripheral  Preliminary Report (24 May 2023 22:02):    No growth to date.    Culture - Blood (collected 23 May 2023 18:10)  Source: .Blood Blood-Peripheral  Preliminary Report (24 May 2023 22:02):    No growth to date.        RADIOLOGY & ADDITIONAL TESTS:    Imaging Personally Reviewed:  Consultant(s) Notes Reviewed:    Care Discussed with Consultants/Other Providers:

## 2023-05-26 NOTE — PROGRESS NOTE ADULT - SUBJECTIVE AND OBJECTIVE BOX
Subjective: seen and examined by bedside   have to direct him multiple times and challenging to get any meaningful history   denies n/v/d/ap    MEDICATIONS  (STANDING):  apixaban 5 milliGRAM(s) Oral every 12 hours  aspirin  chewable 81 milliGRAM(s) Oral daily  atorvastatin 10 milliGRAM(s) Oral at bedtime  dextrose 5%. 1000 milliLiter(s) (50 mL/Hr) IV Continuous <Continuous>  dextrose 5%. 1000 milliLiter(s) (100 mL/Hr) IV Continuous <Continuous>  dextrose 50% Injectable 25 Gram(s) IV Push once  dextrose 50% Injectable 12.5 Gram(s) IV Push once  dextrose 50% Injectable 25 Gram(s) IV Push once  febuxostat 40 milliGRAM(s) Oral daily  folic acid 1 milliGRAM(s) Oral daily  glucagon  Injectable 1 milliGRAM(s) IntraMuscular once  insulin glargine Injectable (LANTUS) 18 Unit(s) SubCutaneous at bedtime  insulin lispro (ADMELOG) corrective regimen sliding scale   SubCutaneous three times a day before meals  insulin lispro (ADMELOG) corrective regimen sliding scale   SubCutaneous at bedtime  insulin lispro Injectable (ADMELOG) 26 Unit(s) SubCutaneous before breakfast  insulin lispro Injectable (ADMELOG) 10 Unit(s) SubCutaneous before lunch  insulin lispro Injectable (ADMELOG) 6 Unit(s) SubCutaneous before dinner  insulin regular  human recombinant. 3 Unit(s) SubCutaneous once  metoprolol succinate  milliGRAM(s) Oral daily  nystatin    Suspension [id number] Unit(s) Oral three times a day  pantoprazole    Tablet 40 milliGRAM(s) Oral before breakfast  predniSONE   Tablet 30 milliGRAM(s) Oral daily  senna 2 Tablet(s) Oral at bedtime  tacrolimus 2.5 milliGRAM(s) Oral two times a day  trimethoprim   80 mG/sulfamethoxazole 400 mG 1 Tablet(s) Oral every 24 hours  valGANciclovir 450 milliGRAM(s) Oral daily    MEDICATIONS  (PRN):  acetaminophen     Tablet .. 650 milliGRAM(s) Oral every 6 hours PRN Temp greater or equal to 38C (100.4F), Mild Pain (1 - 3)  aluminum hydroxide/magnesium hydroxide/simethicone Suspension 30 milliLiter(s) Oral every 4 hours PRN Dyspepsia  dextrose Oral Gel 15 Gram(s) Oral once PRN Blood Glucose LESS THAN 70 milliGRAM(s)/deciliter  melatonin 3 milliGRAM(s) Oral at bedtime PRN Insomnia  ondansetron Injectable 4 milliGRAM(s) IV Push every 8 hours PRN Nausea and/or Vomiting      PHYSICAL EXAM:  VITALS: T(C): 36.4 (05-26-23 @ 05:25)  T(F): 97.6 (05-26-23 @ 05:25), Max: 97.6 (05-25-23 @ 14:15)  HR: 99 (05-26-23 @ 05:25) (96 - 100)  BP: 127/87 (05-26-23 @ 05:25) (105/78 - 127/87)  RR:  (17 - 18)  SpO2:  (96% - 99%)  Wt(kg): --  GENERAL: NAD, well-groomed, well-developed  EYES: No proptosis, no injection  HEENT:  Atraumatic, Normocephalic, moist mucous membranes  THYROID: Normal size, no palpable nodules  RESPIRATORY: Clear to auscultation bilaterally; No rales, rhonchi, wheezing, or rubs  CARDIOVASCULAR: Regular rate and rhythm; No murmurs; no peripheral edema  GI: Soft, nontender, non distended, normal bowel sounds  CUSHING'S SIGNS: no striae    POCT Blood Glucose.: 308 mg/dL (05-26-23 @ 12:18)  POCT Blood Glucose.: 179 mg/dL (05-26-23 @ 07:46)  POCT Blood Glucose.: 98 mg/dL (05-25-23 @ 21:54)  POCT Blood Glucose.: 160 mg/dL (05-25-23 @ 16:49)  POCT Blood Glucose.: 208 mg/dL (05-25-23 @ 12:23)  POCT Blood Glucose.: 283 mg/dL (05-25-23 @ 08:56)  POCT Blood Glucose.: 184 mg/dL (05-24-23 @ 21:33)  POCT Blood Glucose.: 231 mg/dL (05-24-23 @ 17:02)  POCT Blood Glucose.: 248 mg/dL (05-24-23 @ 11:18)  POCT Blood Glucose.: 180 mg/dL (05-24-23 @ 07:42)  POCT Blood Glucose.: 291 mg/dL (05-24-23 @ 05:46)  POCT Blood Glucose.: 433 mg/dL (05-24-23 @ 04:11)  POCT Blood Glucose.: 468 mg/dL (05-24-23 @ 03:33)  POCT Blood Glucose.: 463 mg/dL (05-24-23 @ 03:33)  POCT Blood Glucose.: 545 mg/dL (05-24-23 @ 02:31)  POCT Blood Glucose.: 512 mg/dL (05-24-23 @ 02:29)  POCT Blood Glucose.: 487 mg/dL (05-24-23 @ 00:28)  POCT Blood Glucose.: 479 mg/dL (05-24-23 @ 00:25)    05-26    139  |  98  |  58<H>  ----------------------------<  153<H>  5.1   |  25  |  2.17<H>    eGFR: 31<L>    Ca    8.9      05-26  Mg     1.9     05-26  Phos  3.9     05-26    TPro  5.7<L>  /  Alb  3.9  /  TBili  0.7  /  DBili  x   /  AST  16  /  ALT  25  /  AlkPhos  61  05-23      Thyroid Function Tests:

## 2023-05-26 NOTE — PROGRESS NOTE ADULT - PROBLEM SELECTOR PLAN 1
Reduce Lasix to 40 mg IVP daily given rising creatinine   Continue metoprolol  AlloSure pending  RHC with cardiac biopsy planned for sometime next week

## 2023-05-26 NOTE — PROGRESS NOTE ADULT - PROBLEM SELECTOR PLAN 1
-Exam with volume overload and TTE with lower EF  -decrease diuresis to 40 IV daily   -Allosure pending, if elevated with do biopsy  -monitor creatinine and electrolytes  -strict I and O  -daily standing weights.

## 2023-05-26 NOTE — PROGRESS NOTE ADULT - PROBLEM SELECTOR PLAN 5
Tacro 2.5/2.5, pred 30mg for hemolytic anemia, target 5-7  -tacro level prior to AM dose  -plan above for possible biopsy given EF changes but first will do Allosure testing.

## 2023-05-26 NOTE — PROGRESS NOTE ADULT - PROBLEM SELECTOR PLAN 2
Continue Tacrolimus   Continue Bactrim, valganciclovir   AlloSure pending   RHC with biopsy next week

## 2023-05-26 NOTE — PROGRESS NOTE ADULT - ATTENDING COMMENTS
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 Continue PT. SW to assess for home care needs.   For heart transplant, he appears volume overloaded but diuresing well. OK to decrease Lasix to 40mg daily. Will check AlloSure (cell free DNA) to assess for rejection--sent, results pending. Continue Tacrolimus. Continue ASA, statin.   Hemolytic anemia--continue Prednisone. Plan for Rituxan once more clinically stable per Hematology. Continue prophylaxis with Bactrim, Valcyte, Nystatin. 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 Continue PT. SW to assess for home care needs.   For heart transplant, he appears volume overloaded but diuresing well. OK to decrease Lasix to 40mg daily. Will check AlloSure (cell free DNA) to assess for rejection--sent, results pending. Continue Tacrolimus. Continue ASA, statin.   Hemolytic anemia--continue Prednisone. Plan for Rituxan once more clinically stable per Hematology. Continue prophylaxis with Bactrim (may have to renally dose if creatinine continues to worsen), Valcyte, Nystatin.

## 2023-05-26 NOTE — PROGRESS NOTE ADULT - ASSESSMENT
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  He was seen by the Endo during previous admission in 3/2023.   He is a poor historian, unable to obtain any meaningful history from him.  History obtained from chart review.      # Stress Hyperglycemia  # Chronic Kidney disease - cautious with insulin titration; high risk for insulin stacking and hypoglycemia  # Non-compliance with diet and meds   # Diabetes Mellitus type II   A1c: 8.6%   Home meds: unsure    GFR/Cr: 39/1.82     - Pt was admitted in 3/2023 and during the admission, he was managed on Lantus 18 and Admelog 26/10/6 with meals while being on Prednsione 60 mg daily.   - Steroid taper - 40 mg 5/24, 30 mg 5/25.  Pt will remain on Prednisone 30 mg, primary team inform Endo team regarding changes to steroid dosing.    - Blood glucose target while hospitalized 140-180 mg/dL  - Unsure if pt is eating post-dinner, does not answer the question directly, would recommend increasing Lantus to 20 U HS   - increase Lispro to 29 u with breakfast, 12 units with lunch and decrease to 4 u with dinner.    - c/w mod scale ss with meals and low dose ss at night   - do not give scheduled prandial insulin if patient is NPO  - please monitor fingerstick blood glucose at least 4 times a day to avoid insulin toxicity, hypoglycemia  - Carbohydrate controlled diet, no concentrated sweets  - Counseled on need for medication adherence to avoid new complications.   DISCHARGE: would simply his regimen with possible GLP-1, but need to address compliance again on discharge     # Dyslipidemia: c/w atorvastatin 10 mg HS, LDL goal < 70   # Hypertension: BP goal < 130/80, will defer to primary team     Mary Cole MD  Pager: 9AM - 5PM (Mon-Fri): [phone]  After 5PM and on Weekends: [phone]     For nonurgent matters, please email [email or web address] for assistance.    chart, meds, labs, imaging reviewed

## 2023-05-26 NOTE — PROGRESS NOTE ADULT - SUBJECTIVE AND OBJECTIVE BOX
Subjective: He is feeling well, improved SOB and leg edema/weakness. He is requesting ice cream.     Medications:  acetaminophen     Tablet .. 650 milliGRAM(s) Oral every 6 hours PRN  aluminum hydroxide/magnesium hydroxide/simethicone Suspension 30 milliLiter(s) Oral every 4 hours PRN  apixaban 5 milliGRAM(s) Oral every 12 hours  aspirin  chewable 81 milliGRAM(s) Oral daily  atorvastatin 10 milliGRAM(s) Oral at bedtime  dextrose 5%. 1000 milliLiter(s) IV Continuous <Continuous>  dextrose 5%. 1000 milliLiter(s) IV Continuous <Continuous>  dextrose 50% Injectable 25 Gram(s) IV Push once  dextrose 50% Injectable 12.5 Gram(s) IV Push once  dextrose 50% Injectable 25 Gram(s) IV Push once  dextrose Oral Gel 15 Gram(s) Oral once PRN  febuxostat 40 milliGRAM(s) Oral daily  folic acid 1 milliGRAM(s) Oral daily  glucagon  Injectable 1 milliGRAM(s) IntraMuscular once  insulin glargine Injectable (LANTUS) 18 Unit(s) SubCutaneous at bedtime  insulin lispro (ADMELOG) corrective regimen sliding scale   SubCutaneous three times a day before meals  insulin lispro (ADMELOG) corrective regimen sliding scale   SubCutaneous at bedtime  insulin lispro Injectable (ADMELOG) 26 Unit(s) SubCutaneous before breakfast  insulin lispro Injectable (ADMELOG) 10 Unit(s) SubCutaneous before lunch  insulin lispro Injectable (ADMELOG) 6 Unit(s) SubCutaneous before dinner  insulin regular  human recombinant. 3 Unit(s) SubCutaneous once  melatonin 3 milliGRAM(s) Oral at bedtime PRN  metoprolol succinate  milliGRAM(s) Oral daily  nystatin    Suspension 679320 Unit(s) Oral three times a day  ondansetron Injectable 4 milliGRAM(s) IV Push every 8 hours PRN  pantoprazole    Tablet 40 milliGRAM(s) Oral before breakfast  predniSONE   Tablet 30 milliGRAM(s) Oral daily  senna 2 Tablet(s) Oral at bedtime  tacrolimus 2.5 milliGRAM(s) Oral two times a day  trimethoprim   80 mG/sulfamethoxazole 400 mG 1 Tablet(s) Oral every 24 hours  valGANciclovir 450 milliGRAM(s) Oral daily    Vitals:  T(C): 36.4 (05-26-23 @ 05:25), Max: 36.4 (05-25-23 @ 14:15)  HR: 99 (05-26-23 @ 05:25) (96 - 100)  BP: 127/87 (05-26-23 @ 05:25) (105/78 - 127/87)  RR: 18 (05-26-23 @ 05:25) (17 - 18)  SpO2: 99% (05-26-23 @ 05:25) (96% - 99%)-      I&O's Summary    25 May 2023 07:01  -  26 May 2023 07:00  --------------------------------------------------------  IN: 960 mL / OUT: 1450 mL / NET: -490 mL    26 May 2023 07:01  -  26 May 2023 12:59  --------------------------------------------------------  IN: 720 mL / OUT: 800 mL / NET: -80 mL        Physical Exam:  Appearance: No Acute Distress  HEENT: JVP 7 cm H2O, no HJR  Cardiovascular: RRR, Normal S1 S2, No murmurs/rubs/gallops  Respiratory: Clear to auscultation bilaterally  Gastrointestinal: Soft, Non-tender, non-distended	  Skin: no skin lesions  Neurologic: Non-focal  Extremities: 1+ pitting edema b/l  Psychiatry: A & O x 3, Mood & affect appropriate        Labs:    05-26    139  |  98  |  58<H>  ----------------------------<  153<H>  5.1   |  25  |  2.17<H>    Ca    8.9      26 May 2023 09:26  Phos  3.9     05-26  Mg     1.9     05-26          Tacrolimus (), Serum: 5.3 ng/mL (05-25-23 @ 09:46)  Tacrolimus (), Serum: 4.2 ng/mL (05-24-23 @ 06:00)  Tacrolimus (), Serum: 6.9 ng/mL (05-23-23 @ 18:47)

## 2023-05-26 NOTE — PROGRESS NOTE ADULT - ASSESSMENT
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  Course c/b by URI w/ human metapneumovirus c/f PNA. He presents from clinic due to leg edema and two mechanical falls. He is volume overloaded on exam and recent echo shows Ef decrease from 72 to 50    Cardiac studies:  TTE: 5/19: LVIDd 4.1, EF 51%" Compared to the transthoracic echocardiogram performed on 1/5/2023 There is a decrease in LVEF. On the previous study, the LV was hyperdynamic. There is more turbulence seen in the RV compared to the previous. RV size is mildly dilated but function appears similar.  TTE 3/3/23: LVIDd 3.6, EF 50-55%, concentric remodeling

## 2023-05-27 LAB
ANION GAP SERPL CALC-SCNC: 15 MMOL/L — SIGNIFICANT CHANGE UP (ref 5–17)
BUN SERPL-MCNC: 59 MG/DL — HIGH (ref 7–23)
CALCIUM SERPL-MCNC: 8.7 MG/DL — SIGNIFICANT CHANGE UP (ref 8.4–10.5)
CHLORIDE SERPL-SCNC: 98 MMOL/L — SIGNIFICANT CHANGE UP (ref 96–108)
CO2 SERPL-SCNC: 23 MMOL/L — SIGNIFICANT CHANGE UP (ref 22–31)
CREAT SERPL-MCNC: 2.3 MG/DL — HIGH (ref 0.5–1.3)
EGFR: 29 ML/MIN/1.73M2 — LOW
GLUCOSE BLDC GLUCOMTR-MCNC: 114 MG/DL — HIGH (ref 70–99)
GLUCOSE BLDC GLUCOMTR-MCNC: 185 MG/DL — HIGH (ref 70–99)
GLUCOSE BLDC GLUCOMTR-MCNC: 239 MG/DL — HIGH (ref 70–99)
GLUCOSE BLDC GLUCOMTR-MCNC: 439 MG/DL — HIGH (ref 70–99)
GLUCOSE BLDC GLUCOMTR-MCNC: 57 MG/DL — LOW (ref 70–99)
GLUCOSE BLDC GLUCOMTR-MCNC: 60 MG/DL — LOW (ref 70–99)
GLUCOSE BLDC GLUCOMTR-MCNC: 80 MG/DL — SIGNIFICANT CHANGE UP (ref 70–99)
GLUCOSE BLDC GLUCOMTR-MCNC: 99 MG/DL — SIGNIFICANT CHANGE UP (ref 70–99)
GLUCOSE SERPL-MCNC: 383 MG/DL — HIGH (ref 70–99)
HCT VFR BLD CALC: 34.3 % — LOW (ref 39–50)
HGB BLD-MCNC: 10.5 G/DL — LOW (ref 13–17)
LDH SERPL L TO P-CCNC: 528 U/L — HIGH (ref 50–242)
MAGNESIUM SERPL-MCNC: 1.8 MG/DL — SIGNIFICANT CHANGE UP (ref 1.6–2.6)
MAGNESIUM SERPL-MCNC: 1.9 MG/DL — SIGNIFICANT CHANGE UP (ref 1.6–2.6)
MCHC RBC-ENTMCNC: 30.6 GM/DL — LOW (ref 32–36)
MCHC RBC-ENTMCNC: 31.7 PG — SIGNIFICANT CHANGE UP (ref 27–34)
MCV RBC AUTO: 103.6 FL — HIGH (ref 80–100)
NRBC # BLD: 1 /100 WBCS — HIGH (ref 0–0)
PLATELET # BLD AUTO: 183 K/UL — SIGNIFICANT CHANGE UP (ref 150–400)
POTASSIUM SERPL-MCNC: 5.1 MMOL/L — SIGNIFICANT CHANGE UP (ref 3.5–5.3)
POTASSIUM SERPL-SCNC: 5.1 MMOL/L — SIGNIFICANT CHANGE UP (ref 3.5–5.3)
RBC # BLD: 3.31 M/UL — LOW (ref 4.2–5.8)
RBC # BLD: 3.31 M/UL — LOW (ref 4.2–5.8)
RBC # FLD: 16.6 % — HIGH (ref 10.3–14.5)
RETICS #: 114.5 K/UL — SIGNIFICANT CHANGE UP (ref 25–125)
RETICS/RBC NFR: 3.5 % — HIGH (ref 0.5–2.5)
SODIUM SERPL-SCNC: 136 MMOL/L — SIGNIFICANT CHANGE UP (ref 135–145)
TACROLIMUS SERPL-MCNC: 7 NG/ML — SIGNIFICANT CHANGE UP
WBC # BLD: 10.28 K/UL — SIGNIFICANT CHANGE UP (ref 3.8–10.5)
WBC # FLD AUTO: 10.28 K/UL — SIGNIFICANT CHANGE UP (ref 3.8–10.5)

## 2023-05-27 PROCEDURE — 99233 SBSQ HOSP IP/OBS HIGH 50: CPT

## 2023-05-27 PROCEDURE — 99232 SBSQ HOSP IP/OBS MODERATE 35: CPT

## 2023-05-27 RX ORDER — INSULIN LISPRO 100/ML
VIAL (ML) SUBCUTANEOUS
Refills: 0 | Status: DISCONTINUED | OUTPATIENT
Start: 2023-05-27 | End: 2023-06-02

## 2023-05-27 RX ORDER — INSULIN GLARGINE 100 [IU]/ML
10 INJECTION, SOLUTION SUBCUTANEOUS ONCE
Refills: 0 | Status: COMPLETED | OUTPATIENT
Start: 2023-05-27 | End: 2023-05-27

## 2023-05-27 RX ORDER — DEXTROSE 50 % IN WATER 50 %
15 SYRINGE (ML) INTRAVENOUS ONCE
Refills: 0 | Status: COMPLETED | OUTPATIENT
Start: 2023-05-27 | End: 2023-05-27

## 2023-05-27 RX ORDER — INSULIN LISPRO 100/ML
34 VIAL (ML) SUBCUTANEOUS
Refills: 0 | Status: DISCONTINUED | OUTPATIENT
Start: 2023-05-27 | End: 2023-05-29

## 2023-05-27 RX ORDER — INSULIN LISPRO 100/ML
16 VIAL (ML) SUBCUTANEOUS
Refills: 0 | Status: DISCONTINUED | OUTPATIENT
Start: 2023-05-27 | End: 2023-05-29

## 2023-05-27 RX ORDER — INSULIN LISPRO 100/ML
8 VIAL (ML) SUBCUTANEOUS
Refills: 0 | Status: DISCONTINUED | OUTPATIENT
Start: 2023-05-27 | End: 2023-05-28

## 2023-05-27 RX ORDER — VALGANCICLOVIR 450 MG/1
450 TABLET, FILM COATED ORAL
Refills: 0 | Status: DISCONTINUED | OUTPATIENT
Start: 2023-05-27 | End: 2023-06-02

## 2023-05-27 RX ADMIN — APIXABAN 5 MILLIGRAM(S): 2.5 TABLET, FILM COATED ORAL at 17:36

## 2023-05-27 RX ADMIN — APIXABAN 5 MILLIGRAM(S): 2.5 TABLET, FILM COATED ORAL at 05:28

## 2023-05-27 RX ADMIN — TACROLIMUS 2.5 MILLIGRAM(S): 5 CAPSULE ORAL at 11:38

## 2023-05-27 RX ADMIN — SENNA PLUS 2 TABLET(S): 8.6 TABLET ORAL at 21:29

## 2023-05-27 RX ADMIN — FEBUXOSTAT 40 MILLIGRAM(S): 40 TABLET ORAL at 11:40

## 2023-05-27 RX ADMIN — Medication 1: at 08:29

## 2023-05-27 RX ADMIN — Medication 8 UNIT(S): at 17:37

## 2023-05-27 RX ADMIN — Medication 500000 UNIT(S): at 21:29

## 2023-05-27 RX ADMIN — Medication 200 MILLIGRAM(S): at 05:28

## 2023-05-27 RX ADMIN — Medication 15 GRAM(S): at 21:47

## 2023-05-27 RX ADMIN — Medication 81 MILLIGRAM(S): at 11:40

## 2023-05-27 RX ADMIN — VALGANCICLOVIR 450 MILLIGRAM(S): 450 TABLET, FILM COATED ORAL at 11:40

## 2023-05-27 RX ADMIN — Medication 29 UNIT(S): at 08:29

## 2023-05-27 RX ADMIN — Medication 1 MILLIGRAM(S): at 11:40

## 2023-05-27 RX ADMIN — Medication 40 MILLIGRAM(S): at 05:28

## 2023-05-27 RX ADMIN — Medication 4: at 17:37

## 2023-05-27 RX ADMIN — PANTOPRAZOLE SODIUM 40 MILLIGRAM(S): 20 TABLET, DELAYED RELEASE ORAL at 05:28

## 2023-05-27 RX ADMIN — Medication 6: at 11:42

## 2023-05-27 RX ADMIN — Medication 500000 UNIT(S): at 05:28

## 2023-05-27 RX ADMIN — Medication 12 UNIT(S): at 11:42

## 2023-05-27 RX ADMIN — INSULIN GLARGINE 10 UNIT(S): 100 INJECTION, SOLUTION SUBCUTANEOUS at 23:39

## 2023-05-27 RX ADMIN — TACROLIMUS 2.5 MILLIGRAM(S): 5 CAPSULE ORAL at 17:36

## 2023-05-27 RX ADMIN — Medication 500000 UNIT(S): at 17:36

## 2023-05-27 RX ADMIN — VALGANCICLOVIR 450 MILLIGRAM(S): 450 TABLET, FILM COATED ORAL at 21:29

## 2023-05-27 RX ADMIN — Medication 30 MILLIGRAM(S): at 05:28

## 2023-05-27 RX ADMIN — ATORVASTATIN CALCIUM 10 MILLIGRAM(S): 80 TABLET, FILM COATED ORAL at 21:29

## 2023-05-27 NOTE — PROGRESS NOTE ADULT - PROBLEM SELECTOR PLAN 6
Holding Losartan  Reduced Lasix to daily  Renal US- no hydro  Renally dose meds- valganciclovir to every other day?  Daily BMP

## 2023-05-27 NOTE — PROGRESS NOTE ADULT - SUBJECTIVE AND OBJECTIVE BOX
Odilon Burgess MD    Patient is a 73y old  Male who presents with a chief complaint of near syncope, worsening HF sx (26 May 2023 13:05)        SUBJECTIVE / OVERNIGHT EVENTS: Patient seen and examined. No new events/complaints  TELEMETRY: SR 's      MEDICATIONS  (STANDING):  apixaban 5 milliGRAM(s) Oral every 12 hours  aspirin  chewable 81 milliGRAM(s) Oral daily  atorvastatin 10 milliGRAM(s) Oral at bedtime  dextrose 5%. 1000 milliLiter(s) (50 mL/Hr) IV Continuous <Continuous>  dextrose 5%. 1000 milliLiter(s) (100 mL/Hr) IV Continuous <Continuous>  dextrose 50% Injectable 25 Gram(s) IV Push once  dextrose 50% Injectable 12.5 Gram(s) IV Push once  dextrose 50% Injectable 25 Gram(s) IV Push once  febuxostat 40 milliGRAM(s) Oral daily  folic acid 1 milliGRAM(s) Oral daily  furosemide   Injectable 40 milliGRAM(s) IV Push daily  glucagon  Injectable 1 milliGRAM(s) IntraMuscular once  insulin glargine Injectable (LANTUS) 20 Unit(s) SubCutaneous at bedtime  insulin lispro (ADMELOG) corrective regimen sliding scale   SubCutaneous three times a day before meals  insulin lispro (ADMELOG) corrective regimen sliding scale   SubCutaneous at bedtime  insulin lispro Injectable (ADMELOG) 12 Unit(s) SubCutaneous before lunch  insulin lispro Injectable (ADMELOG) 29 Unit(s) SubCutaneous before breakfast  insulin lispro Injectable (ADMELOG) 4 Unit(s) SubCutaneous before dinner  insulin regular  human recombinant. 3 Unit(s) SubCutaneous once  metoprolol succinate  milliGRAM(s) Oral daily  nystatin    Suspension [id number] Unit(s) Oral three times a day  pantoprazole    Tablet 40 milliGRAM(s) Oral before breakfast  predniSONE   Tablet 30 milliGRAM(s) Oral daily  senna 2 Tablet(s) Oral at bedtime  tacrolimus 2.5 milliGRAM(s) Oral two times a day  trimethoprim   80 mG/sulfamethoxazole 400 mG 1 Tablet(s) Oral every 24 hours  valGANciclovir 450 milliGRAM(s) Oral daily    MEDICATIONS  (PRN):  acetaminophen     Tablet .. 650 milliGRAM(s) Oral every 6 hours PRN Temp greater or equal to 38C (100.4F), Mild Pain (1 - 3)  aluminum hydroxide/magnesium hydroxide/simethicone Suspension 30 milliLiter(s) Oral every 4 hours PRN Dyspepsia  dextrose Oral Gel 15 Gram(s) Oral once PRN Blood Glucose LESS THAN 70 milliGRAM(s)/deciliter  melatonin 3 milliGRAM(s) Oral at bedtime PRN Insomnia  ondansetron Injectable 4 milliGRAM(s) IV Push every 8 hours PRN Nausea and/or Vomiting      Vital Signs Last 24 Hrs  T(C): 36.5 (27 May 2023 04:16), Max: 36.5 (26 May 2023 14:22)  T(F): 97.7 (27 May 2023 04:16), Max: 97.7 (26 May 2023 14:22)  HR: 98 (27 May 2023 04:16) (97 - 100)  BP: 131/89 (27 May 2023 04:16) (113/81 - 131/89)  BP(mean): --  RR: 18 (27 May 2023 04:16) (16 - 18)  SpO2: 98% (27 May 2023 04:16) (96% - 98%)    Parameters below as of 27 May 2023 04:16  Patient On (Oxygen Delivery Method): room air      CAPILLARY BLOOD GLUCOSE      POCT Blood Glucose.: 185 mg/dL (27 May 2023 08:19)  POCT Blood Glucose.: 333 mg/dL (26 May 2023 21:48)  POCT Blood Glucose.: 431 mg/dL (26 May 2023 21:38)  POCT Blood Glucose.: 353 mg/dL (26 May 2023 21:38)  POCT Blood Glucose.: 363 mg/dL (26 May 2023 16:42)  POCT Blood Glucose.: 308 mg/dL (26 May 2023 12:18)    I&O's Summary    26 May 2023 07:01  -  27 May 2023 07:00  --------------------------------------------------------  IN: 1080 mL / OUT: 1800 mL / NET: -720 mL          PHYSICAL EXAM  GENERAL: NAD, well-developed  HEAD:  Atraumatic, normocephalic  EYES: EOMI, PERRLA, conjunctiva and sclera clear  CHEST/LUNG: Bibasilar rales; no wheezes  HEART: +S1+S2, regular rate and rhythm, 3/6 BRENT  ABDOMEN: Soft, nontender, nondistended; bowel sounds present  EXTREMITIES:  2+ peripheral pulses; no clubbing, cyanosis; 2+ pitting edema b/l LEs  PSYCH: AAOx3      LABS:    05-26    139  |  98  |  58<H>  ----------------------------<  153<H>  5.1   |  25  |  2.17<H>    Ca    8.9      26 May 2023 09:26  Phos  3.9     05-26  Mg     1.9     05-26                  RADIOLOGY & ADDITIONAL TESTS:    Imaging Personally Reviewed:  Consultant(s) Notes Reviewed:    Care Discussed with Consultants/Other Providers:

## 2023-05-27 NOTE — PROGRESS NOTE ADULT - ASSESSMENT
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  He was seen by the Endo during previous admission in 3/2023.   He is a poor historian, unable to obtain any meaningful history from him.  History obtained from chart review.      # Stress Hyperglycemia  # Chronic Kidney disease - cautious with insulin titration; high risk for insulin stacking and hypoglycemia  # Non-compliance with diet and meds   # Diabetes Mellitus type II   A1c: 8.6%     - Pt was admitted in 3/2023 and during the admission, he was managed on Lantus 18 and Admelog 26/10/6 with meals while being on Prednisone 60 mg daily.   - Steroid taper - 40 mg 5/24, 30 mg 5/25.  Pt will remain on Prednisone 30 mg, recommend primary team inform Endocrine regarding changes to steroid dosing.    - Blood glucose target while hospitalized 140-180 mg/dL  - Will continue Lantus 20 units  QHS   - Will increase Admelog to 34 u with breakfast, 16 units with lunch and 8 units with dinner.    - Will change to moderate Admelog correction scale with meals and low dose correction scale at night   - do not give scheduled prandial insulin if patient is NPO  - please monitor fingerstick blood glucose at least 4 times a day to avoid insulin toxicity, hypoglycemia  - Carbohydrate controlled diet, no concentrated sweets  - Counseled on need for medication adherence to avoid new complications.   DISCHARGE: would simplify his regimen with possible GLP-1, but need to address compliance again on discharge     # Dyslipidemia: c/w atorvastatin 10 mg HS, LDL goal < 70   # Hypertension: BP goal < 130/80, will defer to primary team     Sung West DO  [phone]     For nonurgent matters, please email [email or web address] for assistance.    chart, meds, labs, imaging reviewed

## 2023-05-27 NOTE — PROGRESS NOTE ADULT - PROBLEM SELECTOR PLAN 1
Continue Lasix 40 mg IVP daily  Continue metoprolol  AlloSure pending  RHC with cardiac biopsy next week

## 2023-05-27 NOTE — PROGRESS NOTE ADULT - SUBJECTIVE AND OBJECTIVE BOX
Chief Complaint: type 2 DM    History: 73 y.o. male with h/o Type 2 Dm currently on steroids (prednisone 30 mg daily). He reports eating well. No GI complaints. No SOB or CP.     MEDICATIONS  (STANDING):  apixaban 5 milliGRAM(s) Oral every 12 hours  aspirin  chewable 81 milliGRAM(s) Oral daily  atorvastatin 10 milliGRAM(s) Oral at bedtime  dextrose 5%. 1000 milliLiter(s) (50 mL/Hr) IV Continuous <Continuous>  dextrose 5%. 1000 milliLiter(s) (100 mL/Hr) IV Continuous <Continuous>  dextrose 50% Injectable 25 Gram(s) IV Push once  dextrose 50% Injectable 12.5 Gram(s) IV Push once  dextrose 50% Injectable 25 Gram(s) IV Push once  febuxostat 40 milliGRAM(s) Oral daily  folic acid 1 milliGRAM(s) Oral daily  glucagon  Injectable 1 milliGRAM(s) IntraMuscular once  insulin glargine Injectable (LANTUS) 20 Unit(s) SubCutaneous at bedtime  insulin lispro (ADMELOG) corrective regimen sliding scale   SubCutaneous three times a day before meals  insulin lispro (ADMELOG) corrective regimen sliding scale   SubCutaneous at bedtime  insulin lispro Injectable (ADMELOG) 29 Unit(s) SubCutaneous before breakfast  insulin lispro Injectable (ADMELOG) 4 Unit(s) SubCutaneous before dinner  insulin lispro Injectable (ADMELOG) 12 Unit(s) SubCutaneous before lunch  insulin regular  human recombinant. 3 Unit(s) SubCutaneous once  metoprolol succinate  milliGRAM(s) Oral daily  nystatin    Suspension [id number] Unit(s) Oral three times a day  pantoprazole    Tablet 40 milliGRAM(s) Oral before breakfast  predniSONE   Tablet 30 milliGRAM(s) Oral daily  senna 2 Tablet(s) Oral at bedtime  tacrolimus 2.5 milliGRAM(s) Oral two times a day  trimethoprim   80 mG/sulfamethoxazole 400 mG 0.5 Tablet(s) Oral daily  valGANciclovir 450 milliGRAM(s) Oral every 48 hours    MEDICATIONS  (PRN):  acetaminophen     Tablet .. 650 milliGRAM(s) Oral every 6 hours PRN Temp greater or equal to 38C (100.4F), Mild Pain (1 - 3)  aluminum hydroxide/magnesium hydroxide/simethicone Suspension 30 milliLiter(s) Oral every 4 hours PRN Dyspepsia  dextrose Oral Gel 15 Gram(s) Oral once PRN Blood Glucose LESS THAN 70 milliGRAM(s)/deciliter  melatonin 3 milliGRAM(s) Oral at bedtime PRN Insomnia  ondansetron Injectable 4 milliGRAM(s) IV Push every 8 hours PRN Nausea and/or Vomiting      Allergies    No Known Allergies    Intolerances      Review of Systems:  Constitutional: No fever  Eyes: reports blurry vision  Neuro: No tremors  HEENT: No pain  Cardiovascular: No chest pain, palpitations  Respiratory: No SOB, no cough  GI: No nausea, vomiting, abdominal pain  Psych: no depression  Endocrine: no polyuria, polydipsia  Hem/lymph: mild swelling  Osteoporosis: no fractures    ALL OTHER SYSTEMS REVIEWED AND NEGATIVE      PHYSICAL EXAM:  VITALS: T(C): 36.4 (05-27-23 @ 12:00)  T(F): 97.6 (05-27-23 @ 12:00), Max: 97.7 (05-26-23 @ 14:22)  HR: 99 (05-27-23 @ 12:00) (97 - 100)  BP: 111/85 (05-27-23 @ 12:00) (111/85 - 131/89)  RR:  (16 - 18)  SpO2:  (96% - 98%)  Wt(kg): --  GENERAL: NAD  HEENT:  Atraumatic, Normocephalic  RESPIRATORY: Clear to auscultation bilaterally; No rales, rhonchi, wheezing  CARDIOVASCULAR: Regular rate and rhythm, there is peripheral edema  GI: Normal bowels sounds, soft, nontender, non distended  PSYCH: normal affect, normal mood    POCT Blood Glucose.: 439 mg/dL (05-27-23 @ 11:40)  POCT Blood Glucose.: 185 mg/dL (05-27-23 @ 08:19)  POCT Blood Glucose.: 333 mg/dL (05-26-23 @ 21:48)  POCT Blood Glucose.: 431 mg/dL (05-26-23 @ 21:38)  POCT Blood Glucose.: 353 mg/dL (05-26-23 @ 21:38)  POCT Blood Glucose.: 363 mg/dL (05-26-23 @ 16:42)  POCT Blood Glucose.: 308 mg/dL (05-26-23 @ 12:18)  POCT Blood Glucose.: 179 mg/dL (05-26-23 @ 07:46)  POCT Blood Glucose.: 98 mg/dL (05-25-23 @ 21:54)  POCT Blood Glucose.: 160 mg/dL (05-25-23 @ 16:49)  POCT Blood Glucose.: 208 mg/dL (05-25-23 @ 12:23)  POCT Blood Glucose.: 283 mg/dL (05-25-23 @ 08:56)  POCT Blood Glucose.: 184 mg/dL (05-24-23 @ 21:33)  POCT Blood Glucose.: 231 mg/dL (05-24-23 @ 17:02)      05-27    136  |  98  |  59<H>  ----------------------------<  383<H>  5.1   |  23  |  2.30<H>    eGFR: 29<L>    Ca    8.7      05-27  Mg     1.8     05-27  Phos  3.9     05-26            Thyroid Function Tests:

## 2023-05-28 LAB
ANION GAP SERPL CALC-SCNC: 13 MMOL/L — SIGNIFICANT CHANGE UP (ref 5–17)
BUN SERPL-MCNC: 58 MG/DL — HIGH (ref 7–23)
CALCIUM SERPL-MCNC: 9 MG/DL — SIGNIFICANT CHANGE UP (ref 8.4–10.5)
CHLORIDE SERPL-SCNC: 101 MMOL/L — SIGNIFICANT CHANGE UP (ref 96–108)
CO2 SERPL-SCNC: 25 MMOL/L — SIGNIFICANT CHANGE UP (ref 22–31)
CREAT SERPL-MCNC: 2.34 MG/DL — HIGH (ref 0.5–1.3)
CULTURE RESULTS: SIGNIFICANT CHANGE UP
CULTURE RESULTS: SIGNIFICANT CHANGE UP
EGFR: 29 ML/MIN/1.73M2 — LOW
GLUCOSE BLDC GLUCOMTR-MCNC: 139 MG/DL — HIGH (ref 70–99)
GLUCOSE BLDC GLUCOMTR-MCNC: 250 MG/DL — HIGH (ref 70–99)
GLUCOSE BLDC GLUCOMTR-MCNC: 311 MG/DL — HIGH (ref 70–99)
GLUCOSE BLDC GLUCOMTR-MCNC: 69 MG/DL — LOW (ref 70–99)
GLUCOSE BLDC GLUCOMTR-MCNC: 70 MG/DL — SIGNIFICANT CHANGE UP (ref 70–99)
GLUCOSE BLDC GLUCOMTR-MCNC: 99 MG/DL — SIGNIFICANT CHANGE UP (ref 70–99)
GLUCOSE SERPL-MCNC: 196 MG/DL — HIGH (ref 70–99)
MAGNESIUM SERPL-MCNC: 2 MG/DL — SIGNIFICANT CHANGE UP (ref 1.6–2.6)
MAGNESIUM SERPL-MCNC: 2 MG/DL — SIGNIFICANT CHANGE UP (ref 1.6–2.6)
POTASSIUM SERPL-MCNC: 5.2 MMOL/L — SIGNIFICANT CHANGE UP (ref 3.5–5.3)
POTASSIUM SERPL-SCNC: 5.2 MMOL/L — SIGNIFICANT CHANGE UP (ref 3.5–5.3)
SODIUM SERPL-SCNC: 139 MMOL/L — SIGNIFICANT CHANGE UP (ref 135–145)
SPECIMEN SOURCE: SIGNIFICANT CHANGE UP
SPECIMEN SOURCE: SIGNIFICANT CHANGE UP
TACROLIMUS SERPL-MCNC: 7.8 NG/ML — SIGNIFICANT CHANGE UP

## 2023-05-28 PROCEDURE — 99233 SBSQ HOSP IP/OBS HIGH 50: CPT

## 2023-05-28 PROCEDURE — 99232 SBSQ HOSP IP/OBS MODERATE 35: CPT

## 2023-05-28 RX ORDER — INSULIN LISPRO 100/ML
10 VIAL (ML) SUBCUTANEOUS ONCE
Refills: 0 | Status: COMPLETED | OUTPATIENT
Start: 2023-05-28 | End: 2023-05-28

## 2023-05-28 RX ORDER — INSULIN LISPRO 100/ML
5 VIAL (ML) SUBCUTANEOUS
Refills: 0 | Status: DISCONTINUED | OUTPATIENT
Start: 2023-05-28 | End: 2023-05-29

## 2023-05-28 RX ADMIN — Medication 500000 UNIT(S): at 05:10

## 2023-05-28 RX ADMIN — TACROLIMUS 2.5 MILLIGRAM(S): 5 CAPSULE ORAL at 17:25

## 2023-05-28 RX ADMIN — FEBUXOSTAT 40 MILLIGRAM(S): 40 TABLET ORAL at 12:32

## 2023-05-28 RX ADMIN — INSULIN GLARGINE 20 UNIT(S): 100 INJECTION, SOLUTION SUBCUTANEOUS at 23:10

## 2023-05-28 RX ADMIN — Medication 500000 UNIT(S): at 21:05

## 2023-05-28 RX ADMIN — PANTOPRAZOLE SODIUM 40 MILLIGRAM(S): 20 TABLET, DELAYED RELEASE ORAL at 05:10

## 2023-05-28 RX ADMIN — Medication 4: at 17:24

## 2023-05-28 RX ADMIN — Medication 10 UNIT(S): at 08:26

## 2023-05-28 RX ADMIN — Medication 8: at 12:30

## 2023-05-28 RX ADMIN — Medication 500000 UNIT(S): at 12:32

## 2023-05-28 RX ADMIN — Medication 30 MILLIGRAM(S): at 05:10

## 2023-05-28 RX ADMIN — Medication 0.5 TABLET(S): at 12:33

## 2023-05-28 RX ADMIN — ATORVASTATIN CALCIUM 10 MILLIGRAM(S): 80 TABLET, FILM COATED ORAL at 21:04

## 2023-05-28 RX ADMIN — Medication 200 MILLIGRAM(S): at 05:09

## 2023-05-28 RX ADMIN — Medication 81 MILLIGRAM(S): at 12:32

## 2023-05-28 RX ADMIN — APIXABAN 5 MILLIGRAM(S): 2.5 TABLET, FILM COATED ORAL at 17:24

## 2023-05-28 RX ADMIN — Medication 1 MILLIGRAM(S): at 12:32

## 2023-05-28 RX ADMIN — APIXABAN 5 MILLIGRAM(S): 2.5 TABLET, FILM COATED ORAL at 05:10

## 2023-05-28 RX ADMIN — Medication 16 UNIT(S): at 12:30

## 2023-05-28 RX ADMIN — TACROLIMUS 2.5 MILLIGRAM(S): 5 CAPSULE ORAL at 10:47

## 2023-05-28 RX ADMIN — Medication 5 UNIT(S): at 17:24

## 2023-05-28 RX ADMIN — SENNA PLUS 2 TABLET(S): 8.6 TABLET ORAL at 21:05

## 2023-05-28 NOTE — PROGRESS NOTE ADULT - PROBLEM SELECTOR PLAN 6
D/C'd losartan and Lasix  Renal US- no hydro  Renally dose meds- valganciclovir and Bactrim adjusted for creatinine clearance  Daily BMP

## 2023-05-28 NOTE — PROGRESS NOTE ADULT - ASSESSMENT
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  He was seen by the Endo during previous admission in 3/2023.  He is a poor historian, unable to obtain any meaningful history from him.  History obtained from chart review.      # Stress Hyperglycemia  # Chronic Kidney disease - cautious with insulin titration; high risk for insulin stacking and hypoglycemia  # Non-compliance with diet and meds   # Diabetes Mellitus type II   A1c: 8.6%     - Pt was admitted in 3/2023 and during the admission, he was managed on Lantus 18 and Admelog 26/10/6 with meals while being on Prednisone 60 mg daily.   - Steroid taper - 40 mg 5/24, 30 mg 5/25.  Pt will remain on Prednisone 30 mg, recommend primary team inform Endocrine regarding changes to steroid dosing.    - Blood glucose target while hospitalized 140-180 mg/dL  - Will continue Lantus 20 units  QHS   - Will increase Admelog to 34 u with breakfast, 16 units with lunch and 8 units with dinner.    - Will change to moderate Admelog correction scale with meals and low dose correction scale at night   - do not give scheduled prandial insulin if patient is NPO  - please monitor fingerstick blood glucose at least 4 times a day to avoid insulin toxicity, hypoglycemia  - Carbohydrate controlled diet, no concentrated sweets  - Counseled on need for medication adherence to avoid new complications.   DISCHARGE: would simplify his regimen with possible GLP-1, but need to address compliance again on discharge     # Dyslipidemia: c/w atorvastatin 10 mg HS, LDL goal < 70   # Hypertension: BP goal < 130/80, will defer to primary team     Sung West DO  [phone]     For nonurgent matters, please email [email or web address] for assistance.    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  He was seen by the Endo during previous admission in 3/2023.  He is a poor historian, unable to obtain any meaningful history from him.  History obtained from chart review.      # Stress Hyperglycemia  # Chronic Kidney disease - cautious with insulin titration; high risk for insulin stacking and hypoglycemia  # Non-compliance with diet and meds   # Diabetes Mellitus type II   A1c: 8.6%     - Pt was admitted in 3/2023 and during the admission, he was managed on Lantus 18 and Admelog 26/10/6 with meals while being on Prednisone 60 mg daily.   - Steroid taper - 40 mg 5/24, 30 mg 5/25.  Pt will remain on Prednisone 30 mg, recommend primary team inform Endocrine regarding changes to steroid dosing.    - Blood glucose target while hospitalized 140-180 mg/dL  - Reduce  Lantus 16 units  QHS   - Adjust Admelog to 34 u with breakfast, 16 units with lunch and 5 units with dinner.    - continue with  moderate Admelog correction scale with meals and low dose correction scale at night   - do not give scheduled prandial insulin if patient is NPO  - please monitor fingerstick blood glucose at least 4 times a day to avoid insulin toxicity, hypoglycemia  - Carbohydrate controlled diet, no concentrated sweets  - Counseled on need for medication adherence to avoid new complications.   DISCHARGE: would simplify his regimen with possible GLP-1, but need to address compliance again on discharge     # Dyslipidemia: c/w atorvastatin 10 mg HS, LDL goal < 70   # Hypertension: BP goal < 130/80, will defer to primary team       Contact via [employer] Teams during business hours  To reach covering provider access PILAR via [employer] tools  For Urgent matters/after-hours/weekends/holidays please page endocrine fellow on call   For nonurgent matters please email [email or web address]    Please note that this patient may be followed by different provider tomorrow.  Notify endocrine 24 hours prior to discharge for final recommendations

## 2023-05-28 NOTE — PROGRESS NOTE ADULT - SUBJECTIVE AND OBJECTIVE BOX
seen earlier today     Chief Complaint: Diabetes Mellitus follow up    INTERVAL HX: pt endorses at home he ran out of the insulin prescribed here and started using old insulin , also endorses not taking lunchtime dose of lispro most of the time when questioned why unable to give answer, pt reports aide helps with breakfast injection and aicha Nawaf helps with dinner injection & lantus , called Nawaf  [phone] who reports he is unsure if pt taking insulin at lunch but believes he is not taking it because pt is afraid of sticking himself with needles .   Hypoglycemic to 57 at dinner > pt endorses sweating/dizziness/nausea > resolved after oral glucose gel + juice       Review of Systems:  General: As above  GI: nausea   Endocrine: sweating/dizziness/nausea     Allergies    No Known Allergies    Intolerances      MEDICATIONS  (STANDING):  apixaban 5 milliGRAM(s) Oral every 12 hours  aspirin  chewable 81 milliGRAM(s) Oral daily  atorvastatin 10 milliGRAM(s) Oral at bedtime  dextrose 5%. 1000 milliLiter(s) (50 mL/Hr) IV Continuous <Continuous>  dextrose 5%. 1000 milliLiter(s) (100 mL/Hr) IV Continuous <Continuous>  dextrose 50% Injectable 25 Gram(s) IV Push once  dextrose 50% Injectable 12.5 Gram(s) IV Push once  dextrose 50% Injectable 25 Gram(s) IV Push once  febuxostat 40 milliGRAM(s) Oral daily  folic acid 1 milliGRAM(s) Oral daily  glucagon  Injectable 1 milliGRAM(s) IntraMuscular once  insulin glargine Injectable (LANTUS) 20 Unit(s) SubCutaneous at bedtime  insulin lispro (ADMELOG) corrective regimen sliding scale   SubCutaneous three times a day before meals  insulin lispro (ADMELOG) corrective regimen sliding scale   SubCutaneous at bedtime  insulin lispro Injectable (ADMELOG) 34 Unit(s) SubCutaneous before breakfast  insulin lispro Injectable (ADMELOG) 16 Unit(s) SubCutaneous before lunch  insulin lispro Injectable (ADMELOG) 8 Unit(s) SubCutaneous before dinner  metoprolol succinate  milliGRAM(s) Oral daily  nystatin    Suspension 841058 Unit(s) Oral three times a day  pantoprazole    Tablet 40 milliGRAM(s) Oral before breakfast  predniSONE   Tablet 30 milliGRAM(s) Oral daily  senna 2 Tablet(s) Oral at bedtime  tacrolimus 2.5 milliGRAM(s) Oral two times a day  trimethoprim   80 mG/sulfamethoxazole 400 mG 0.5 Tablet(s) Oral daily  valGANciclovir 450 milliGRAM(s) Oral every 48 hours      atorvastatin   10 milliGRAM(s) Oral (05-27-23 @ 21:29)    dextrose Oral Gel   15 Gram(s) Oral (05-27-23 @ 21:47)    febuxostat   40 milliGRAM(s) Oral (05-28-23 @ 12:32)    insulin glargine Injectable (LANTUS)   10 Unit(s) SubCutaneous (05-27-23 @ 23:39)    insulin lispro (ADMELOG) corrective regimen sliding scale   8 Unit(s) SubCutaneous (05-28-23 @ 12:30)   4 Unit(s) SubCutaneous (05-27-23 @ 17:37)    insulin lispro Injectable (ADMELOG)   8 Unit(s) SubCutaneous (05-27-23 @ 17:37)    insulin lispro Injectable (ADMELOG)   16 Unit(s) SubCutaneous (05-28-23 @ 12:30)    insulin lispro Injectable (ADMELOG).   10 Unit(s) SubCutaneous (05-28-23 @ 08:26)    predniSONE   Tablet   30 milliGRAM(s) Oral (05-28-23 @ 05:10)        PHYSICAL EXAM:  VITALS: T(C): 36.5 (05-28-23 @ 12:02)  T(F): 97.7 (05-28-23 @ 12:02), Max: 98.2 (05-28-23 @ 04:02)  HR: 99 (05-28-23 @ 12:02) (93 - 99)  BP: 113/83 (05-28-23 @ 12:02) (113/83 - 132/89)  RR:  (18 - 18)  SpO2:  (96% - 99%)  Wt(kg): --  GENERAL: male laying in bed in NAD  Respiratory: Respirations unlabored   Extremities: Warm, no edema  NEURO: Alert  appropriate     LABS:  POCT Blood Glucose.: 311 mg/dL (05-28-23 @ 12:14)  POCT Blood Glucose.: 99 mg/dL (05-28-23 @ 08:03)  POCT Blood Glucose.: 114 mg/dL (05-27-23 @ 23:13)  POCT Blood Glucose.: 99 mg/dL (05-27-23 @ 22:51)  POCT Blood Glucose.: 80 mg/dL (05-27-23 @ 22:16)  POCT Blood Glucose.: 60 mg/dL (05-27-23 @ 21:38)  POCT Blood Glucose.: 57 mg/dL (05-27-23 @ 21:37)  POCT Blood Glucose.: 239 mg/dL (05-27-23 @ 17:14)  POCT Blood Glucose.: 439 mg/dL (05-27-23 @ 11:40)  POCT Blood Glucose.: 185 mg/dL (05-27-23 @ 08:19)  POCT Blood Glucose.: 333 mg/dL (05-26-23 @ 21:48)  POCT Blood Glucose.: 431 mg/dL (05-26-23 @ 21:38)  POCT Blood Glucose.: 353 mg/dL (05-26-23 @ 21:38)  POCT Blood Glucose.: 363 mg/dL (05-26-23 @ 16:42)  POCT Blood Glucose.: 308 mg/dL (05-26-23 @ 12:18)  POCT Blood Glucose.: 179 mg/dL (05-26-23 @ 07:46)  POCT Blood Glucose.: 98 mg/dL (05-25-23 @ 21:54)  POCT Blood Glucose.: 160 mg/dL (05-25-23 @ 16:49)                          10.5   10.28 )-----------( 183      ( 27 May 2023 11:54 )             34.3     05-28    139  |  101  |  58<H>  ----------------------------<  196<H>  5.2   |  25  |  2.34<H>    Ca    9.0      28 May 2023 11:16  Mg     2.0     05-28      eGFR: 29 mL/min/1.73m2 (28 May 2023 11:16)      Thyroid Function Tests:      A1C with Estimated Average Glucose Result: 8.6 % (05-24-23 @ 06:00)    Estimated Average Glucose: 200 mg/dL (05-24-23 @ 06:00)        Diet, Regular:   Consistent Carbohydrate No Snacks (CSTCHO)  DASH/TLC Sodium & Cholesterol Restricted (DASH) (05-23-23 @ 20:36) [Active]              seen earlier today     Chief Complaint: Diabetes Mellitus follow up    INTERVAL HX: pt endorses at home he ran out of the insulin prescribed here and started using old insulin , also endorses not taking lunchtime dose of lispro most of the time when questioned why unable to give answer, pt reports aide helps with breakfast injection and aicha Nawaf helps with dinner injection & lantus , called Nawaf  [phone] who reports he is unsure if pt taking insulin at lunch but believes he is not taking it because pt is afraid of sticking himself with needles .   Hypoglycemic to 57 at dinner > pt endorses sweating/dizziness/nausea > resolved after oral glucose gel + juice ; received lantus 10 units instead of 20 due ot hypoglycemia. ,pt endorses he finished dinner . received admelog 10 units instead of ordered 34 units with breakfast this AM per team      Review of Systems:  General: As above  GI: nausea   Endocrine: sweating/dizziness/nausea     Allergies    No Known Allergies    Intolerances      MEDICATIONS  (STANDING):  apixaban 5 milliGRAM(s) Oral every 12 hours  aspirin  chewable 81 milliGRAM(s) Oral daily  atorvastatin 10 milliGRAM(s) Oral at bedtime  dextrose 5%. 1000 milliLiter(s) (50 mL/Hr) IV Continuous <Continuous>  dextrose 5%. 1000 milliLiter(s) (100 mL/Hr) IV Continuous <Continuous>  dextrose 50% Injectable 25 Gram(s) IV Push once  dextrose 50% Injectable 12.5 Gram(s) IV Push once  dextrose 50% Injectable 25 Gram(s) IV Push once  febuxostat 40 milliGRAM(s) Oral daily  folic acid 1 milliGRAM(s) Oral daily  glucagon  Injectable 1 milliGRAM(s) IntraMuscular once  insulin glargine Injectable (LANTUS) 20 Unit(s) SubCutaneous at bedtime  insulin lispro (ADMELOG) corrective regimen sliding scale   SubCutaneous three times a day before meals  insulin lispro (ADMELOG) corrective regimen sliding scale   SubCutaneous at bedtime  insulin lispro Injectable (ADMELOG) 34 Unit(s) SubCutaneous before breakfast  insulin lispro Injectable (ADMELOG) 16 Unit(s) SubCutaneous before lunch  insulin lispro Injectable (ADMELOG) 8 Unit(s) SubCutaneous before dinner  metoprolol succinate  milliGRAM(s) Oral daily  nystatin    Suspension [id number] Unit(s) Oral three times a day  pantoprazole    Tablet 40 milliGRAM(s) Oral before breakfast  predniSONE   Tablet 30 milliGRAM(s) Oral daily  senna 2 Tablet(s) Oral at bedtime  tacrolimus 2.5 milliGRAM(s) Oral two times a day  trimethoprim   80 mG/sulfamethoxazole 400 mG 0.5 Tablet(s) Oral daily  valGANciclovir 450 milliGRAM(s) Oral every 48 hours      atorvastatin   10 milliGRAM(s) Oral (05-27-23 @ 21:29)    dextrose Oral Gel   15 Gram(s) Oral (05-27-23 @ 21:47)    febuxostat   40 milliGRAM(s) Oral (05-28-23 @ 12:32)    insulin glargine Injectable (LANTUS)   10 Unit(s) SubCutaneous (05-27-23 @ 23:39)    insulin lispro (ADMELOG) corrective regimen sliding scale   8 Unit(s) SubCutaneous (05-28-23 @ 12:30)   4 Unit(s) SubCutaneous (05-27-23 @ 17:37)    insulin lispro Injectable (ADMELOG)   8 Unit(s) SubCutaneous (05-27-23 @ 17:37)    insulin lispro Injectable (ADMELOG)   16 Unit(s) SubCutaneous (05-28-23 @ 12:30)    insulin lispro Injectable (ADMELOG).   10 Unit(s) SubCutaneous (05-28-23 @ 08:26)    predniSONE   Tablet   30 milliGRAM(s) Oral (05-28-23 @ 05:10)        PHYSICAL EXAM:  VITALS: T(C): 36.5 (05-28-23 @ 12:02)  T(F): 97.7 (05-28-23 @ 12:02), Max: 98.2 (05-28-23 @ 04:02)  HR: 99 (05-28-23 @ 12:02) (93 - 99)  BP: 113/83 (05-28-23 @ 12:02) (113/83 - 132/89)  RR:  (18 - 18)  SpO2:  (96% - 99%)  Wt(kg): --  GENERAL: male laying in bed in NAD  Respiratory: Respirations unlabored   Extremities: Warm, no edema  NEURO: Alert  appropriate     LABS:  POCT Blood Glucose.: 311 mg/dL (05-28-23 @ 12:14)  POCT Blood Glucose.: 99 mg/dL (05-28-23 @ 08:03)  POCT Blood Glucose.: 114 mg/dL (05-27-23 @ 23:13)  POCT Blood Glucose.: 99 mg/dL (05-27-23 @ 22:51)  POCT Blood Glucose.: 80 mg/dL (05-27-23 @ 22:16)  POCT Blood Glucose.: 60 mg/dL (05-27-23 @ 21:38)  POCT Blood Glucose.: 57 mg/dL (05-27-23 @ 21:37)  POCT Blood Glucose.: 239 mg/dL (05-27-23 @ 17:14)  POCT Blood Glucose.: 439 mg/dL (05-27-23 @ 11:40)  POCT Blood Glucose.: 185 mg/dL (05-27-23 @ 08:19)  POCT Blood Glucose.: 333 mg/dL (05-26-23 @ 21:48)  POCT Blood Glucose.: 431 mg/dL (05-26-23 @ 21:38)  POCT Blood Glucose.: 353 mg/dL (05-26-23 @ 21:38)  POCT Blood Glucose.: 363 mg/dL (05-26-23 @ 16:42)  POCT Blood Glucose.: 308 mg/dL (05-26-23 @ 12:18)  POCT Blood Glucose.: 179 mg/dL (05-26-23 @ 07:46)  POCT Blood Glucose.: 98 mg/dL (05-25-23 @ 21:54)  POCT Blood Glucose.: 160 mg/dL (05-25-23 @ 16:49)                          10.5   10.28 )-----------( 183      ( 27 May 2023 11:54 )             34.3     05-28    139  |  101  |  58<H>  ----------------------------<  196<H>  5.2   |  25  |  2.34<H>    Ca    9.0      28 May 2023 11:16  Mg     2.0     05-28      eGFR: 29 mL/min/1.73m2 (28 May 2023 11:16)      Thyroid Function Tests:      A1C with Estimated Average Glucose Result: 8.6 % (05-24-23 @ 06:00)    Estimated Average Glucose: 200 mg/dL (05-24-23 @ 06:00)        Diet, Regular:   Consistent Carbohydrate No Snacks (CSTCHO)  DASH/TLC Sodium & Cholesterol Restricted (DASH) (05-23-23 @ 20:36) [Active]

## 2023-05-28 NOTE — PROGRESS NOTE ADULT - SUBJECTIVE AND OBJECTIVE BOX
Odilon Burgess MD    Patient is a 73y old  Male who presents with a chief complaint of near syncope, worsening HF sx (27 May 2023 13:54)        SUBJECTIVE / OVERNIGHT EVENTS: Patient seen and examined. No new events/complaints  TELEMETRY: SR 's, 1st deg      MEDICATIONS  (STANDING):  apixaban 5 milliGRAM(s) Oral every 12 hours  aspirin  chewable 81 milliGRAM(s) Oral daily  atorvastatin 10 milliGRAM(s) Oral at bedtime  dextrose 5%. 1000 milliLiter(s) (50 mL/Hr) IV Continuous <Continuous>  dextrose 5%. 1000 milliLiter(s) (100 mL/Hr) IV Continuous <Continuous>  dextrose 50% Injectable 25 Gram(s) IV Push once  dextrose 50% Injectable 12.5 Gram(s) IV Push once  dextrose 50% Injectable 25 Gram(s) IV Push once  febuxostat 40 milliGRAM(s) Oral daily  folic acid 1 milliGRAM(s) Oral daily  glucagon  Injectable 1 milliGRAM(s) IntraMuscular once  insulin glargine Injectable (LANTUS) 20 Unit(s) SubCutaneous at bedtime  insulin lispro (ADMELOG) corrective regimen sliding scale   SubCutaneous three times a day before meals  insulin lispro (ADMELOG) corrective regimen sliding scale   SubCutaneous at bedtime  insulin lispro Injectable (ADMELOG) 34 Unit(s) SubCutaneous before breakfast  insulin lispro Injectable (ADMELOG) 16 Unit(s) SubCutaneous before lunch  insulin lispro Injectable (ADMELOG) 8 Unit(s) SubCutaneous before dinner  metoprolol succinate  milliGRAM(s) Oral daily  nystatin    Suspension [id number] Unit(s) Oral three times a day  pantoprazole    Tablet 40 milliGRAM(s) Oral before breakfast  predniSONE   Tablet 30 milliGRAM(s) Oral daily  senna 2 Tablet(s) Oral at bedtime  tacrolimus 2.5 milliGRAM(s) Oral two times a day  trimethoprim   80 mG/sulfamethoxazole 400 mG 0.5 Tablet(s) Oral daily  valGANciclovir 450 milliGRAM(s) Oral every 48 hours    MEDICATIONS  (PRN):  acetaminophen     Tablet .. 650 milliGRAM(s) Oral every 6 hours PRN Temp greater or equal to 38C (100.4F), Mild Pain (1 - 3)  aluminum hydroxide/magnesium hydroxide/simethicone Suspension 30 milliLiter(s) Oral every 4 hours PRN Dyspepsia  dextrose Oral Gel 15 Gram(s) Oral once PRN Blood Glucose LESS THAN 70 milliGRAM(s)/deciliter  melatonin 3 milliGRAM(s) Oral at bedtime PRN Insomnia  ondansetron Injectable 4 milliGRAM(s) IV Push every 8 hours PRN Nausea and/or Vomiting      Vital Signs Last 24 Hrs  T(C): 36.8 (28 May 2023 04:02), Max: 36.8 (28 May 2023 04:02)  T(F): 98.2 (28 May 2023 04:02), Max: 98.2 (28 May 2023 04:02)  HR: 93 (28 May 2023 04:02) (93 - 98)  BP: 132/89 (28 May 2023 04:02) (120/85 - 132/89)  BP(mean): --  RR: 18 (28 May 2023 04:02) (18 - 18)  SpO2: 97% (28 May 2023 04:02) (97% - 99%)    Parameters below as of 28 May 2023 04:02  Patient On (Oxygen Delivery Method): room air      CAPILLARY BLOOD GLUCOSE      POCT Blood Glucose.: 311 mg/dL (28 May 2023 12:14)  POCT Blood Glucose.: 99 mg/dL (28 May 2023 08:03)  POCT Blood Glucose.: 114 mg/dL (27 May 2023 23:13)  POCT Blood Glucose.: 99 mg/dL (27 May 2023 22:51)  POCT Blood Glucose.: 80 mg/dL (27 May 2023 22:16)  POCT Blood Glucose.: 60 mg/dL (27 May 2023 21:38)  POCT Blood Glucose.: 57 mg/dL (27 May 2023 21:37)  POCT Blood Glucose.: 239 mg/dL (27 May 2023 17:14)    I&O's Summary    27 May 2023 07:01  -  28 May 2023 07:00  --------------------------------------------------------  IN: 535 mL / OUT: 0 mL / NET: 535 mL          PHYSICAL EXAM  GENERAL: NAD, well-developed  HEAD:  Atraumatic, normocephalic  EYES: EOMI, PERRLA, conjunctiva and sclera clear  CHEST/LUNG: Bibasilar rales; no wheezes  HEART: +S1+S2, regular rate and rhythm, 3/6 BRENT  ABDOMEN: Soft, nontender, nondistended; bowel sounds present  EXTREMITIES:  2+ peripheral pulses; no clubbing, cyanosis; 2+ pitting edema b/l LEs  PSYCH: AAOx3      LABS:                        10.5   10.28 )-----------( 183      ( 27 May 2023 11:54 )             34.3     05-28    139  |  101  |  58<H>  ----------------------------<  196<H>  5.2   |  25  |  2.34<H>    Ca    9.0      28 May 2023 11:16  Mg     2.0     05-28                  RADIOLOGY & ADDITIONAL TESTS:    Imaging Personally Reviewed:  Consultant(s) Notes Reviewed:    Care Discussed with Consultants/Other Providers:

## 2023-05-28 NOTE — PROGRESS NOTE ADULT - PROBLEM SELECTOR PLAN 2
Continue Tacrolimus   Continue Bactrim, valganciclovir- doses adjusted for creatinine clearance   AlloSure pending   RHC with biopsy next week

## 2023-05-29 LAB
ANION GAP SERPL CALC-SCNC: 14 MMOL/L — SIGNIFICANT CHANGE UP (ref 5–17)
BUN SERPL-MCNC: 57 MG/DL — HIGH (ref 7–23)
CALCIUM SERPL-MCNC: 8.8 MG/DL — SIGNIFICANT CHANGE UP (ref 8.4–10.5)
CHLORIDE SERPL-SCNC: 102 MMOL/L — SIGNIFICANT CHANGE UP (ref 96–108)
CO2 SERPL-SCNC: 24 MMOL/L — SIGNIFICANT CHANGE UP (ref 22–31)
CREAT SERPL-MCNC: 2.33 MG/DL — HIGH (ref 0.5–1.3)
EGFR: 29 ML/MIN/1.73M2 — LOW
GLUCOSE BLDC GLUCOMTR-MCNC: 105 MG/DL — HIGH (ref 70–99)
GLUCOSE BLDC GLUCOMTR-MCNC: 125 MG/DL — HIGH (ref 70–99)
GLUCOSE BLDC GLUCOMTR-MCNC: 154 MG/DL — HIGH (ref 70–99)
GLUCOSE BLDC GLUCOMTR-MCNC: 214 MG/DL — HIGH (ref 70–99)
GLUCOSE SERPL-MCNC: 161 MG/DL — HIGH (ref 70–99)
HCT VFR BLD CALC: 34.7 % — LOW (ref 39–50)
HGB BLD-MCNC: 10.6 G/DL — LOW (ref 13–17)
MAGNESIUM SERPL-MCNC: 2.2 MG/DL — SIGNIFICANT CHANGE UP (ref 1.6–2.6)
MCHC RBC-ENTMCNC: 30.5 GM/DL — LOW (ref 32–36)
MCHC RBC-ENTMCNC: 31.8 PG — SIGNIFICANT CHANGE UP (ref 27–34)
MCV RBC AUTO: 104.2 FL — HIGH (ref 80–100)
NRBC # BLD: 1 /100 WBCS — HIGH (ref 0–0)
PLATELET # BLD AUTO: 200 K/UL — SIGNIFICANT CHANGE UP (ref 150–400)
POTASSIUM SERPL-MCNC: 5.3 MMOL/L — SIGNIFICANT CHANGE UP (ref 3.5–5.3)
POTASSIUM SERPL-SCNC: 5.3 MMOL/L — SIGNIFICANT CHANGE UP (ref 3.5–5.3)
RBC # BLD: 3.33 M/UL — LOW (ref 4.2–5.8)
RBC # FLD: 16.5 % — HIGH (ref 10.3–14.5)
SODIUM SERPL-SCNC: 140 MMOL/L — SIGNIFICANT CHANGE UP (ref 135–145)
TACROLIMUS SERPL-MCNC: 8.8 NG/ML — SIGNIFICANT CHANGE UP
WBC # BLD: 11.53 K/UL — HIGH (ref 3.8–10.5)
WBC # FLD AUTO: 11.53 K/UL — HIGH (ref 3.8–10.5)

## 2023-05-29 PROCEDURE — 99233 SBSQ HOSP IP/OBS HIGH 50: CPT

## 2023-05-29 PROCEDURE — 99232 SBSQ HOSP IP/OBS MODERATE 35: CPT

## 2023-05-29 RX ORDER — INSULIN LISPRO 100/ML
3 VIAL (ML) SUBCUTANEOUS
Refills: 0 | Status: DISCONTINUED | OUTPATIENT
Start: 2023-05-29 | End: 2023-05-31

## 2023-05-29 RX ORDER — INSULIN LISPRO 100/ML
30 VIAL (ML) SUBCUTANEOUS
Refills: 0 | Status: DISCONTINUED | OUTPATIENT
Start: 2023-05-30 | End: 2023-05-31

## 2023-05-29 RX ORDER — INSULIN LISPRO 100/ML
18 VIAL (ML) SUBCUTANEOUS
Refills: 0 | Status: DISCONTINUED | OUTPATIENT
Start: 2023-05-29 | End: 2023-05-31

## 2023-05-29 RX ORDER — INSULIN GLARGINE 100 [IU]/ML
18 INJECTION, SOLUTION SUBCUTANEOUS AT BEDTIME
Refills: 0 | Status: DISCONTINUED | OUTPATIENT
Start: 2023-05-29 | End: 2023-05-31

## 2023-05-29 RX ADMIN — Medication 30 MILLIGRAM(S): at 05:36

## 2023-05-29 RX ADMIN — Medication 0.5 TABLET(S): at 11:08

## 2023-05-29 RX ADMIN — ATORVASTATIN CALCIUM 10 MILLIGRAM(S): 80 TABLET, FILM COATED ORAL at 22:22

## 2023-05-29 RX ADMIN — Medication 18 UNIT(S): at 12:19

## 2023-05-29 RX ADMIN — Medication 3 UNIT(S): at 17:15

## 2023-05-29 RX ADMIN — INSULIN GLARGINE 18 UNIT(S): 100 INJECTION, SOLUTION SUBCUTANEOUS at 22:30

## 2023-05-29 RX ADMIN — APIXABAN 5 MILLIGRAM(S): 2.5 TABLET, FILM COATED ORAL at 05:36

## 2023-05-29 RX ADMIN — Medication 2: at 17:14

## 2023-05-29 RX ADMIN — TACROLIMUS 2.5 MILLIGRAM(S): 5 CAPSULE ORAL at 16:48

## 2023-05-29 RX ADMIN — Medication 34 UNIT(S): at 08:21

## 2023-05-29 RX ADMIN — SENNA PLUS 2 TABLET(S): 8.6 TABLET ORAL at 22:23

## 2023-05-29 RX ADMIN — Medication 81 MILLIGRAM(S): at 11:09

## 2023-05-29 RX ADMIN — Medication 500000 UNIT(S): at 22:22

## 2023-05-29 RX ADMIN — FEBUXOSTAT 40 MILLIGRAM(S): 40 TABLET ORAL at 11:08

## 2023-05-29 RX ADMIN — Medication 200 MILLIGRAM(S): at 05:36

## 2023-05-29 RX ADMIN — TACROLIMUS 2.5 MILLIGRAM(S): 5 CAPSULE ORAL at 09:03

## 2023-05-29 RX ADMIN — Medication 500000 UNIT(S): at 13:26

## 2023-05-29 RX ADMIN — Medication 1 MILLIGRAM(S): at 11:08

## 2023-05-29 RX ADMIN — Medication 500000 UNIT(S): at 05:37

## 2023-05-29 RX ADMIN — VALGANCICLOVIR 450 MILLIGRAM(S): 450 TABLET, FILM COATED ORAL at 22:23

## 2023-05-29 RX ADMIN — APIXABAN 5 MILLIGRAM(S): 2.5 TABLET, FILM COATED ORAL at 16:48

## 2023-05-29 RX ADMIN — PANTOPRAZOLE SODIUM 40 MILLIGRAM(S): 20 TABLET, DELAYED RELEASE ORAL at 05:37

## 2023-05-29 NOTE — PROGRESS NOTE ADULT - ASSESSMENT
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  He was seen by the Endo during previous admission in 3/2023.       # Stress Hyperglycemia  # Chronic Kidney disease - cautious with insulin titration; high risk for insulin stacking and hypoglycemia  # Non-compliance with diet and meds   # Diabetes Mellitus type II   A1c: 8.6%     - Pt was admitted in 3/2023 and during the admission, he was managed on Lantus 18 and Admelog 26/10/6 with meals while being on Prednisone 60 mg daily.   - Steroid taper - 40 mg 5/24, 30 mg 5/25.  Pt will remain on Prednisone 30 mg, recommend primary team inform Endocrine regarding changes to steroid dosing.    - Blood glucose target while hospitalized 140-180 mg/dL  - Reduce  Lantus 18 units  QHS   - Adjust Admelog to 30 u with breakfast, 16 units with lunch and 3 units with dinner.    - continue with  moderate Admelog correction scale with meals and low dose correction scale at night   - do not give scheduled prandial insulin if patient is NPO  - please monitor fingerstick blood glucose at least 4 times a day to avoid insulin toxicity, hypoglycemia  - Carbohydrate controlled diet, no concentrated sweets  - Counseled on need for medication adherence to avoid new complications.   DISCHARGE:  pt endorses compliance , discussed  with relative Nawaf 5/28 unsure if pt taking insulin at lunch but believes he is not taking it because pt is afraid of sticking himself with needles , aide helps with breakfast injection and Nawaf helps with dinner injection & lantus; patient reports he was taking lunch lispro however was using  old insulin as he ran out of insulin pens . Discussed potentially switching to 70/30 to ensure pt receives both doses however pt reported he does not always eat 3 times a day so will continue with basal bolus upon discharge with adjusted doses   -ensure pt has adequate insulin/supplies   prior to discharge   -Can follow at endo practice. [address] [facility] [address]. Phone   Aug 15th 10:45am w/Dr Clemens          # Dyslipidemia: c/w atorvastatin 10 mg HS, LDL goal < 70   # Hypertension: BP goal < 130/80, will defer to primary team     discussed with Pt and covering RN   Contact via [employer] Teams during business hours  To reach covering provider access AMION via [employer] tools  For Urgent matters/after-hours/weekends/holidays please page endocrine fellow on call   For nonurgent matters please email [email or web address]    Please note that this patient may be followed by different provider tomorrow.  Notify endocrine 24 hours prior to discharge for final recommendations

## 2023-05-29 NOTE — PROGRESS NOTE ADULT - SUBJECTIVE AND OBJECTIVE BOX
Odilon Burgess MD    Patient is a 73y old  Male who presents with a chief complaint of near syncope, worsening HF sx (28 May 2023 13:06)        SUBJECTIVE / OVERNIGHT EVENTS: Patient seen and examined. No new events/complaints  TELEMETRY: SR 's      MEDICATIONS  (STANDING):  apixaban 5 milliGRAM(s) Oral every 12 hours  aspirin  chewable 81 milliGRAM(s) Oral daily  atorvastatin 10 milliGRAM(s) Oral at bedtime  dextrose 5%. 1000 milliLiter(s) (50 mL/Hr) IV Continuous <Continuous>  dextrose 5%. 1000 milliLiter(s) (100 mL/Hr) IV Continuous <Continuous>  dextrose 50% Injectable 25 Gram(s) IV Push once  dextrose 50% Injectable 12.5 Gram(s) IV Push once  dextrose 50% Injectable 25 Gram(s) IV Push once  febuxostat 40 milliGRAM(s) Oral daily  folic acid 1 milliGRAM(s) Oral daily  glucagon  Injectable 1 milliGRAM(s) IntraMuscular once  insulin glargine Injectable (LANTUS) 20 Unit(s) SubCutaneous at bedtime  insulin lispro (ADMELOG) corrective regimen sliding scale   SubCutaneous at bedtime  insulin lispro (ADMELOG) corrective regimen sliding scale   SubCutaneous three times a day before meals  insulin lispro Injectable (ADMELOG) 34 Unit(s) SubCutaneous before breakfast  insulin lispro Injectable (ADMELOG) 3 Unit(s) SubCutaneous before dinner  insulin lispro Injectable (ADMELOG) 18 Unit(s) SubCutaneous before lunch  metoprolol succinate  milliGRAM(s) Oral daily  nystatin    Suspension 899082 Unit(s) Oral three times a day  pantoprazole    Tablet 40 milliGRAM(s) Oral before breakfast  predniSONE   Tablet 30 milliGRAM(s) Oral daily  senna 2 Tablet(s) Oral at bedtime  tacrolimus 2.5 milliGRAM(s) Oral two times a day  trimethoprim   80 mG/sulfamethoxazole 400 mG 0.5 Tablet(s) Oral daily  valGANciclovir 450 milliGRAM(s) Oral every 48 hours    MEDICATIONS  (PRN):  acetaminophen     Tablet .. 650 milliGRAM(s) Oral every 6 hours PRN Temp greater or equal to 38C (100.4F), Mild Pain (1 - 3)  aluminum hydroxide/magnesium hydroxide/simethicone Suspension 30 milliLiter(s) Oral every 4 hours PRN Dyspepsia  dextrose Oral Gel 15 Gram(s) Oral once PRN Blood Glucose LESS THAN 70 milliGRAM(s)/deciliter  melatonin 3 milliGRAM(s) Oral at bedtime PRN Insomnia  ondansetron Injectable 4 milliGRAM(s) IV Push every 8 hours PRN Nausea and/or Vomiting      Vital Signs Last 24 Hrs  T(C): 36.6 (29 May 2023 05:32), Max: 36.8 (28 May 2023 21:47)  T(F): 97.8 (29 May 2023 05:32), Max: 98.2 (28 May 2023 21:47)  HR: 90 (29 May 2023 05:32) (90 - 99)  BP: 132/85 (29 May 2023 05:32) (111/79 - 132/85)  BP(mean): --  RR: 18 (29 May 2023 05:32) (18 - 18)  SpO2: 97% (29 May 2023 05:32) (96% - 99%)    Parameters below as of 29 May 2023 05:32  Patient On (Oxygen Delivery Method): room air      CAPILLARY BLOOD GLUCOSE      POCT Blood Glucose.: 125 mg/dL (29 May 2023 08:06)  POCT Blood Glucose.: 139 mg/dL (28 May 2023 23:03)  POCT Blood Glucose.: 69 mg/dL (28 May 2023 22:11)  POCT Blood Glucose.: 70 mg/dL (28 May 2023 22:10)  POCT Blood Glucose.: 250 mg/dL (28 May 2023 17:17)  POCT Blood Glucose.: 311 mg/dL (28 May 2023 12:14)    I&O's Summary    28 May 2023 07:01  -  29 May 2023 07:00  --------------------------------------------------------  IN: 225 mL / OUT: 400 mL / NET: -175 mL    29 May 2023 07:01  -  29 May 2023 11:45  --------------------------------------------------------  IN: 240 mL / OUT: 0 mL / NET: 240 mL          PHYSICAL EXAM  GENERAL: NAD, well-developed  HEAD:  Atraumatic, normocephalic  EYES: EOMI, PERRLA, conjunctiva and sclera clear  CHEST/LUNG: Bibasilar rales; no wheezes  HEART: +S1+S2, regular rate and rhythm, 3/6 BRENT  ABDOMEN: Soft, nontender, nondistended; bowel sounds present  EXTREMITIES:  2+ peripheral pulses; no clubbing, cyanosis; 2+ pitting edema b/l LEs  PSYCH: AAOx3      LABS:                        10.6   11.53 )-----------( 200      ( 29 May 2023 10:55 )             34.7     05-29    140  |  102  |  57<H>  ----------------------------<  161<H>  5.3   |  24  |  2.33<H>    Ca    8.8      29 May 2023 10:54  Mg     2.2     05-29                  RADIOLOGY & ADDITIONAL TESTS:    Imaging Personally Reviewed:  Consultant(s) Notes Reviewed:    Care Discussed with Consultants/Other Providers:

## 2023-05-29 NOTE — PROGRESS NOTE ADULT - PROBLEM SELECTOR PLAN 1
Continue metoprolol  AlloSure pending  RHC with cardiac biopsy next week  Diuretics d/c'd due to NORMAN

## 2023-05-29 NOTE — PROGRESS NOTE ADULT - SUBJECTIVE AND OBJECTIVE BOX
seen earlier today     Chief Complaint: Diabetes Mellitus follow up    INTERVAL HX: pt reports tolerating full meal yesterday, noted BG tightly controlled to 70 at bedtime, repeat BG 69 hypoglycemic, pt denies hypoglycemia symptoms reports received 3 juices with repeat  > pt asking for cookies instead of juice to avoid extra fluid explained fast acting CHO administered to improve BG quickly pt continues to ask for cookies instead> informedcovering RN, would encourage juice first though. BG tightly controlled at lunch .  Of note pt has a chocolate bar at bedside endorses he has had it since admission and only had a small piece this morning> AC lunch BG at goal despite nutritional indiscretions & steroids , endocrine rec to reduce lantus not followed with FBG at goal tightly controlled   pt endorses he was taking all injections at right time at home however when he ran out of insulin pens he started using his old vial/syringe insulin and is unsure if it was  or not       Review of Systems:  General: As above  GI: No nausea, vomiting  Endocrine: no  S&Sx of hypoglycemia    Allergies    No Known Allergies    Intolerances      MEDICATIONS  (STANDING):  apixaban 5 milliGRAM(s) Oral every 12 hours  aspirin  chewable 81 milliGRAM(s) Oral daily  atorvastatin 10 milliGRAM(s) Oral at bedtime  dextrose 5%. 1000 milliLiter(s) (100 mL/Hr) IV Continuous <Continuous>  dextrose 5%. 1000 milliLiter(s) (50 mL/Hr) IV Continuous <Continuous>  dextrose 50% Injectable 25 Gram(s) IV Push once  dextrose 50% Injectable 12.5 Gram(s) IV Push once  dextrose 50% Injectable 25 Gram(s) IV Push once  febuxostat 40 milliGRAM(s) Oral daily  folic acid 1 milliGRAM(s) Oral daily  glucagon  Injectable 1 milliGRAM(s) IntraMuscular once  insulin glargine Injectable (LANTUS) 20 Unit(s) SubCutaneous at bedtime  insulin lispro (ADMELOG) corrective regimen sliding scale   SubCutaneous three times a day before meals  insulin lispro (ADMELOG) corrective regimen sliding scale   SubCutaneous at bedtime  insulin lispro Injectable (ADMELOG) 18 Unit(s) SubCutaneous before lunch  insulin lispro Injectable (ADMELOG) 3 Unit(s) SubCutaneous before dinner  metoprolol succinate  milliGRAM(s) Oral daily  nystatin    Suspension 725649 Unit(s) Oral three times a day  pantoprazole    Tablet 40 milliGRAM(s) Oral before breakfast  predniSONE   Tablet 30 milliGRAM(s) Oral daily  senna 2 Tablet(s) Oral at bedtime  tacrolimus 2.5 milliGRAM(s) Oral two times a day  trimethoprim   80 mG/sulfamethoxazole 400 mG 0.5 Tablet(s) Oral daily  valGANciclovir 450 milliGRAM(s) Oral every 48 hours      atorvastatin   10 milliGRAM(s) Oral (23 @ 21:04)    febuxostat   40 milliGRAM(s) Oral (23 @ 11:08)   40 milliGRAM(s) Oral (23 @ 12:32)    insulin glargine Injectable (LANTUS)   20 Unit(s) SubCutaneous (23 @ 23:10)    insulin lispro (ADMELOG) corrective regimen sliding scale   4 Unit(s) SubCutaneous (23 @ 17:24)   8 Unit(s) SubCutaneous (23 @ 12:30)    insulin lispro Injectable (ADMELOG)   34 Unit(s) SubCutaneous (23 @ 08:21)    insulin lispro Injectable (ADMELOG)   16 Unit(s) SubCutaneous (23 @ 12:30)    insulin lispro Injectable (ADMELOG)   5 Unit(s) SubCutaneous (23 @ 17:24)    predniSONE   Tablet   30 milliGRAM(s) Oral (23 @ 05:36)        PHYSICAL EXAM:  VITALS: T(C): 36.6 (23 @ 05:32)  T(F): 97.8 (23 @ 05:32), Max: 98.2 (23 @ 21:47)  HR: 90 (23 @ 05:32) (90 - 93)  BP: 132/85 (23 @ 05:32) (111/79 - 132/85)  RR:  (18 - 18)  SpO2:  (96% - 99%)  Wt(kg): --  GENERAL: male laying in bed in NAD  Respiratory: Respirations unlabored   Extremities: Warm, no edema  NEURO: Alert , appropriate     LABS:  POCT Blood Glucose.: 105 mg/dL (23 @ 12:13)  POCT Blood Glucose.: 125 mg/dL (23 @ 08:06)  POCT Blood Glucose.: 139 mg/dL (23 @ 23:03)  POCT Blood Glucose.: 69 mg/dL (23 @ 22:11)  POCT Blood Glucose.: 70 mg/dL (23 @ 22:10)  POCT Blood Glucose.: 250 mg/dL (23 @ 17:17)  POCT Blood Glucose.: 311 mg/dL (23 @ 12:14)  POCT Blood Glucose.: 99 mg/dL (23 @ 08:03)  POCT Blood Glucose.: 114 mg/dL (23 @ 23:13)  POCT Blood Glucose.: 99 mg/dL (23 @ 22:51)  POCT Blood Glucose.: 80 mg/dL (23 @ 22:16)  POCT Blood Glucose.: 60 mg/dL (23 @ 21:38)  POCT Blood Glucose.: 57 mg/dL (23 @ 21:37)  POCT Blood Glucose.: 239 mg/dL (23 @ 17:14)  POCT Blood Glucose.: 439 mg/dL (23 @ 11:40)  POCT Blood Glucose.: 185 mg/dL (23 @ 08:19)  POCT Blood Glucose.: 333 mg/dL (23 @ 21:48)  POCT Blood Glucose.: 431 mg/dL (23 @ 21:38)  POCT Blood Glucose.: 353 mg/dL (23 @ 21:38)  POCT Blood Glucose.: 363 mg/dL (23 @ 16:42)                          10.6   11.53 )-----------( 200      ( 29 May 2023 10:55 )             34.7         140  |  102  |  57<H>  ----------------------------<  161<H>  5.3   |  24  |  2.33<H>    Ca    8.8      29 May 2023 10:54  Mg     2.2           eGFR: 29 mL/min/1.73m2 (29 May 2023 10:54)      Thyroid Function Tests:      A1C with Estimated Average Glucose Result: 8.6 % (23 @ 06:00)    Estimated Average Glucose: 200 mg/dL (23 @ 06:00)        Diet, Regular:   Consistent Carbohydrate No Snacks (CSTCHO)  DASH/TLC Sodium & Cholesterol Restricted (DASH) (23 @ 20:36) [Active]

## 2023-05-30 LAB
ANION GAP SERPL CALC-SCNC: 12 MMOL/L — SIGNIFICANT CHANGE UP (ref 5–17)
BUN SERPL-MCNC: 50 MG/DL — HIGH (ref 7–23)
CALCIUM SERPL-MCNC: 8.5 MG/DL — SIGNIFICANT CHANGE UP (ref 8.4–10.5)
CHLORIDE SERPL-SCNC: 104 MMOL/L — SIGNIFICANT CHANGE UP (ref 96–108)
CO2 SERPL-SCNC: 22 MMOL/L — SIGNIFICANT CHANGE UP (ref 22–31)
CREAT SERPL-MCNC: 1.91 MG/DL — HIGH (ref 0.5–1.3)
EGFR: 37 ML/MIN/1.73M2 — LOW
GLUCOSE BLDC GLUCOMTR-MCNC: 116 MG/DL — HIGH (ref 70–99)
GLUCOSE BLDC GLUCOMTR-MCNC: 128 MG/DL — HIGH (ref 70–99)
GLUCOSE BLDC GLUCOMTR-MCNC: 173 MG/DL — HIGH (ref 70–99)
GLUCOSE BLDC GLUCOMTR-MCNC: 214 MG/DL — HIGH (ref 70–99)
GLUCOSE SERPL-MCNC: 226 MG/DL — HIGH (ref 70–99)
HCT VFR BLD CALC: 35 % — LOW (ref 39–50)
HGB BLD-MCNC: 10.5 G/DL — LOW (ref 13–17)
MAGNESIUM SERPL-MCNC: 2.3 MG/DL — SIGNIFICANT CHANGE UP (ref 1.6–2.6)
MAGNESIUM SERPL-MCNC: 2.4 MG/DL — SIGNIFICANT CHANGE UP (ref 1.6–2.6)
MCHC RBC-ENTMCNC: 30 GM/DL — LOW (ref 32–36)
MCHC RBC-ENTMCNC: 31.4 PG — SIGNIFICANT CHANGE UP (ref 27–34)
MCV RBC AUTO: 104.8 FL — HIGH (ref 80–100)
NRBC # BLD: 2 /100 WBCS — HIGH (ref 0–0)
PLATELET # BLD AUTO: 205 K/UL — SIGNIFICANT CHANGE UP (ref 150–400)
POTASSIUM SERPL-MCNC: 4.9 MMOL/L — SIGNIFICANT CHANGE UP (ref 3.5–5.3)
POTASSIUM SERPL-SCNC: 4.9 MMOL/L — SIGNIFICANT CHANGE UP (ref 3.5–5.3)
RBC # BLD: 3.34 M/UL — LOW (ref 4.2–5.8)
RBC # FLD: 16.8 % — HIGH (ref 10.3–14.5)
SODIUM SERPL-SCNC: 138 MMOL/L — SIGNIFICANT CHANGE UP (ref 135–145)
TACROLIMUS SERPL-MCNC: 8.8 NG/ML — SIGNIFICANT CHANGE UP
WBC # BLD: 10.81 K/UL — HIGH (ref 3.8–10.5)
WBC # FLD AUTO: 10.81 K/UL — HIGH (ref 3.8–10.5)

## 2023-05-30 PROCEDURE — 99233 SBSQ HOSP IP/OBS HIGH 50: CPT

## 2023-05-30 RX ADMIN — INSULIN GLARGINE 18 UNIT(S): 100 INJECTION, SOLUTION SUBCUTANEOUS at 22:09

## 2023-05-30 RX ADMIN — Medication 500000 UNIT(S): at 05:08

## 2023-05-30 RX ADMIN — PANTOPRAZOLE SODIUM 40 MILLIGRAM(S): 20 TABLET, DELAYED RELEASE ORAL at 05:08

## 2023-05-30 RX ADMIN — TACROLIMUS 2.5 MILLIGRAM(S): 5 CAPSULE ORAL at 20:58

## 2023-05-30 RX ADMIN — SENNA PLUS 2 TABLET(S): 8.6 TABLET ORAL at 21:01

## 2023-05-30 RX ADMIN — Medication 1 TABLET(S): at 15:31

## 2023-05-30 RX ADMIN — TACROLIMUS 2.5 MILLIGRAM(S): 5 CAPSULE ORAL at 11:58

## 2023-05-30 RX ADMIN — Medication 1 MILLIGRAM(S): at 12:00

## 2023-05-30 RX ADMIN — ATORVASTATIN CALCIUM 10 MILLIGRAM(S): 80 TABLET, FILM COATED ORAL at 21:02

## 2023-05-30 RX ADMIN — Medication 200 MILLIGRAM(S): at 05:08

## 2023-05-30 RX ADMIN — Medication 30 MILLIGRAM(S): at 05:08

## 2023-05-30 RX ADMIN — APIXABAN 5 MILLIGRAM(S): 2.5 TABLET, FILM COATED ORAL at 05:08

## 2023-05-30 RX ADMIN — Medication 2: at 08:24

## 2023-05-30 RX ADMIN — Medication 81 MILLIGRAM(S): at 12:01

## 2023-05-30 RX ADMIN — Medication 500000 UNIT(S): at 15:30

## 2023-05-30 RX ADMIN — Medication 30 UNIT(S): at 08:24

## 2023-05-30 RX ADMIN — APIXABAN 5 MILLIGRAM(S): 2.5 TABLET, FILM COATED ORAL at 17:49

## 2023-05-30 RX ADMIN — FEBUXOSTAT 40 MILLIGRAM(S): 40 TABLET ORAL at 12:01

## 2023-05-30 RX ADMIN — Medication 3 UNIT(S): at 17:48

## 2023-05-30 RX ADMIN — Medication 500000 UNIT(S): at 21:01

## 2023-05-30 RX ADMIN — Medication 18 UNIT(S): at 12:01

## 2023-05-30 NOTE — PROGRESS NOTE ADULT - PROBLEM SELECTOR PLAN 5
fs controlled  fs achs  Endocrine following  c/w Humalog to 34-16-5 and Lantus to 16 U QHS with sliding scal  c/w carb controlled diet/ dash

## 2023-05-30 NOTE — PROGRESS NOTE ADULT - PROBLEM SELECTOR PLAN 1
Continue metoprolol  AlloSure pending  RHC with cardiac biopsy this week  Diuretics d/c'd due to NORMAN

## 2023-05-30 NOTE — PROGRESS NOTE ADULT - NSPROGADDITIONALINFOA_GEN_ALL_CORE
disposition: [facility] with biopsy this week    Wanda Silva D.O.  available on MS teams

## 2023-05-30 NOTE — CHART NOTE - NSCHARTNOTEFT_GEN_A_CORE
Patient requires a rolling walker for d/c to home due to diagnosis of generalized weakness, CHF s/p LVAD and heart transplant . Impairments have been found in following areas: Aerobic capacity, endurance, gait, locomotion, and balance, muscle strength, self-care, home management and community/leisure. This is to help assist the patient with walking safely and to assist with ADL's.      Paola Hernandez, OPAL

## 2023-05-30 NOTE — ED PROVIDER NOTE - TIMING
Follow prep on Wednesday. Clear liquids only. Nothing by mouth after midnight. Stop Eliquis and Aspirin 6/6/23. Take Metoprolol AM of procedure with small sip of water.  
gradual onset

## 2023-05-30 NOTE — PROGRESS NOTE ADULT - SUBJECTIVE AND OBJECTIVE BOX
no events overnight.    GENERAL: No fevers, no chills.  EYES: No blurry vision,  No photophobia  ENT: No sore throat.  No dysphagia  Cardiovascular: No chest pain, palpitations, orthopnea  Pulmonary: No cough, no wheezing. No shortness of breath  Gastrointestinal: No abdominal pain, no diarrhea, no constipation.    Musculoskeletal: No weakness.  No myalgias.  Dermatology:  No rashes.  Neuro: No Headache.  No vertigo.  No dizziness.  Psych: No anxiety, no depression.  Denies suicidal thoughts.    MEDICATIONS  (STANDING):  apixaban 5 milliGRAM(s) Oral every 12 hours  aspirin  chewable 81 milliGRAM(s) Oral daily  atorvastatin 10 milliGRAM(s) Oral at bedtime  dextrose 5%. 1000 milliLiter(s) (50 mL/Hr) IV Continuous <Continuous>  dextrose 5%. 1000 milliLiter(s) (100 mL/Hr) IV Continuous <Continuous>  dextrose 50% Injectable 12.5 Gram(s) IV Push once  dextrose 50% Injectable 25 Gram(s) IV Push once  dextrose 50% Injectable 25 Gram(s) IV Push once  febuxostat 40 milliGRAM(s) Oral daily  folic acid 1 milliGRAM(s) Oral daily  glucagon  Injectable 1 milliGRAM(s) IntraMuscular once  insulin glargine Injectable (LANTUS) 18 Unit(s) SubCutaneous at bedtime  insulin lispro (ADMELOG) corrective regimen sliding scale   SubCutaneous at bedtime  insulin lispro (ADMELOG) corrective regimen sliding scale   SubCutaneous three times a day before meals  insulin lispro Injectable (ADMELOG) 18 Unit(s) SubCutaneous before lunch  insulin lispro Injectable (ADMELOG) 3 Unit(s) SubCutaneous before dinner  insulin lispro Injectable (ADMELOG) 30 Unit(s) SubCutaneous before breakfast  metoprolol succinate  milliGRAM(s) Oral daily  nystatin    Suspension 999887 Unit(s) Oral three times a day  pantoprazole    Tablet 40 milliGRAM(s) Oral before breakfast  predniSONE   Tablet 30 milliGRAM(s) Oral daily  senna 2 Tablet(s) Oral at bedtime  tacrolimus 2.5 milliGRAM(s) Oral two times a day  trimethoprim   80 mG/sulfamethoxazole 400 mG 0.5 Tablet(s) Oral daily  valGANciclovir 450 milliGRAM(s) Oral every 48 hours    MEDICATIONS  (PRN):  acetaminophen     Tablet .. 650 milliGRAM(s) Oral every 6 hours PRN Temp greater or equal to 38C (100.4F), Mild Pain (1 - 3)  aluminum hydroxide/magnesium hydroxide/simethicone Suspension 30 milliLiter(s) Oral every 4 hours PRN Dyspepsia  dextrose Oral Gel 15 Gram(s) Oral once PRN Blood Glucose LESS THAN 70 milliGRAM(s)/deciliter  melatonin 3 milliGRAM(s) Oral at bedtime PRN Insomnia  ondansetron Injectable 4 milliGRAM(s) IV Push every 8 hours PRN Nausea and/or Vomiting    PHYSICAL EXAM  GENERAL: NAD  HEAD:  Atraumatic, normocephalic  EYES: EOMI, PERRLA, conjunctiva and sclera clear  CHEST/LUNG: Bibasilar rales; no wheezes  HEART: +S1+S2, regular rate and rhythm, 3/6 BRENT  ABDOMEN: Soft, nontender, nondistended; bowel sounds present  EXTREMITIES:  2+ peripheral pulses; no clubbing, cyanosis; 1+ pitting edema b/l LE  PSYCH: AAOx3    .  LABS:                         10.5   10.81 )-----------( 205      ( 30 May 2023 09:57 )             35.0     05-30    138  |  104  |  50<H>  ----------------------------<  226<H>  4.9   |  22  |  1.91<H>    Ca    8.5      30 May 2023 10:00  Mg     2.4     05-30                RADIOLOGY, EKG & ADDITIONAL TESTS: Reviewed.

## 2023-05-30 NOTE — PROGRESS NOTE ADULT - PROBLEM SELECTOR PLAN 2
Continue Tacrolimus   Continue Bactrim, valganciclovir- doses adjusted for creatinine clearance   AlloSure pending   RHC with biopsy this week

## 2023-05-31 ENCOUNTER — NON-APPOINTMENT (OUTPATIENT)
Age: 73
End: 2023-05-31

## 2023-05-31 ENCOUNTER — APPOINTMENT (OUTPATIENT)
Dept: INFUSION THERAPY | Facility: HOSPITAL | Age: 73
End: 2023-05-31

## 2023-05-31 DIAGNOSIS — E87.70 FLUID OVERLOAD, UNSPECIFIED: ICD-10-CM

## 2023-05-31 LAB
ANION GAP SERPL CALC-SCNC: 15 MMOL/L — SIGNIFICANT CHANGE UP (ref 5–17)
BUN SERPL-MCNC: 45 MG/DL — HIGH (ref 7–23)
CALCIUM SERPL-MCNC: 8.1 MG/DL — LOW (ref 8.4–10.5)
CHLORIDE SERPL-SCNC: 106 MMOL/L — SIGNIFICANT CHANGE UP (ref 96–108)
CO2 SERPL-SCNC: 19 MMOL/L — LOW (ref 22–31)
CREAT SERPL-MCNC: 1.83 MG/DL — HIGH (ref 0.5–1.3)
EGFR: 38 ML/MIN/1.73M2 — LOW
GLUCOSE BLDC GLUCOMTR-MCNC: 131 MG/DL — HIGH (ref 70–99)
GLUCOSE BLDC GLUCOMTR-MCNC: 147 MG/DL — HIGH (ref 70–99)
GLUCOSE BLDC GLUCOMTR-MCNC: 263 MG/DL — HIGH (ref 70–99)
GLUCOSE BLDC GLUCOMTR-MCNC: 99 MG/DL — SIGNIFICANT CHANGE UP (ref 70–99)
GLUCOSE SERPL-MCNC: 133 MG/DL — HIGH (ref 70–99)
POTASSIUM SERPL-MCNC: 5.5 MMOL/L — HIGH (ref 3.5–5.3)
POTASSIUM SERPL-SCNC: 5.5 MMOL/L — HIGH (ref 3.5–5.3)
SODIUM SERPL-SCNC: 140 MMOL/L — SIGNIFICANT CHANGE UP (ref 135–145)

## 2023-05-31 PROCEDURE — 99233 SBSQ HOSP IP/OBS HIGH 50: CPT

## 2023-05-31 PROCEDURE — 99232 SBSQ HOSP IP/OBS MODERATE 35: CPT

## 2023-05-31 RX ORDER — INSULIN LISPRO 100/ML
4 VIAL (ML) SUBCUTANEOUS
Refills: 0 | Status: DISCONTINUED | OUTPATIENT
Start: 2023-05-31 | End: 2023-06-02

## 2023-05-31 RX ORDER — INSULIN LISPRO 100/ML
16 VIAL (ML) SUBCUTANEOUS
Refills: 0 | Status: DISCONTINUED | OUTPATIENT
Start: 2023-05-31 | End: 2023-06-01

## 2023-05-31 RX ORDER — INSULIN LISPRO 100/ML
28 VIAL (ML) SUBCUTANEOUS
Refills: 0 | Status: DISCONTINUED | OUTPATIENT
Start: 2023-06-01 | End: 2023-06-01

## 2023-05-31 RX ORDER — BUMETANIDE 0.25 MG/ML
1 INJECTION INTRAMUSCULAR; INTRAVENOUS DAILY
Refills: 0 | Status: DISCONTINUED | OUTPATIENT
Start: 2023-05-31 | End: 2023-06-02

## 2023-05-31 RX ORDER — INSULIN GLARGINE 100 [IU]/ML
20 INJECTION, SOLUTION SUBCUTANEOUS EVERY MORNING
Refills: 0 | Status: DISCONTINUED | OUTPATIENT
Start: 2023-06-01 | End: 2023-06-02

## 2023-05-31 RX ORDER — HUMAN INSULIN 100 [IU]/ML
8 INJECTION, SUSPENSION SUBCUTANEOUS ONCE
Refills: 0 | Status: COMPLETED | OUTPATIENT
Start: 2023-05-31 | End: 2023-05-31

## 2023-05-31 RX ORDER — INSULIN GLARGINE 100 [IU]/ML
18 INJECTION, SOLUTION SUBCUTANEOUS AT BEDTIME
Refills: 0 | Status: DISCONTINUED | OUTPATIENT
Start: 2023-05-31 | End: 2023-05-31

## 2023-05-31 RX ORDER — TACROLIMUS 5 MG/1
2 CAPSULE ORAL
Refills: 0 | Status: DISCONTINUED | OUTPATIENT
Start: 2023-05-31 | End: 2023-06-02

## 2023-05-31 RX ADMIN — Medication 4 UNIT(S): at 17:34

## 2023-05-31 RX ADMIN — HUMAN INSULIN 8 UNIT(S): 100 INJECTION, SUSPENSION SUBCUTANEOUS at 21:56

## 2023-05-31 RX ADMIN — SENNA PLUS 2 TABLET(S): 8.6 TABLET ORAL at 21:57

## 2023-05-31 RX ADMIN — VALGANCICLOVIR 450 MILLIGRAM(S): 450 TABLET, FILM COATED ORAL at 20:29

## 2023-05-31 RX ADMIN — TACROLIMUS 2 MILLIGRAM(S): 5 CAPSULE ORAL at 17:39

## 2023-05-31 RX ADMIN — Medication 500000 UNIT(S): at 05:22

## 2023-05-31 RX ADMIN — Medication 1 TABLET(S): at 11:17

## 2023-05-31 RX ADMIN — ATORVASTATIN CALCIUM 10 MILLIGRAM(S): 80 TABLET, FILM COATED ORAL at 21:57

## 2023-05-31 RX ADMIN — TACROLIMUS 2.5 MILLIGRAM(S): 5 CAPSULE ORAL at 05:22

## 2023-05-31 RX ADMIN — Medication 6: at 12:06

## 2023-05-31 RX ADMIN — Medication 30 UNIT(S): at 08:15

## 2023-05-31 RX ADMIN — FEBUXOSTAT 40 MILLIGRAM(S): 40 TABLET ORAL at 11:17

## 2023-05-31 RX ADMIN — Medication 500000 UNIT(S): at 21:56

## 2023-05-31 RX ADMIN — APIXABAN 5 MILLIGRAM(S): 2.5 TABLET, FILM COATED ORAL at 17:35

## 2023-05-31 RX ADMIN — PANTOPRAZOLE SODIUM 40 MILLIGRAM(S): 20 TABLET, DELAYED RELEASE ORAL at 05:22

## 2023-05-31 RX ADMIN — APIXABAN 5 MILLIGRAM(S): 2.5 TABLET, FILM COATED ORAL at 05:23

## 2023-05-31 RX ADMIN — Medication 16 UNIT(S): at 12:07

## 2023-05-31 RX ADMIN — Medication 30 MILLIGRAM(S): at 05:22

## 2023-05-31 RX ADMIN — Medication 81 MILLIGRAM(S): at 11:17

## 2023-05-31 RX ADMIN — Medication 1 MILLIGRAM(S): at 11:17

## 2023-05-31 RX ADMIN — Medication 200 MILLIGRAM(S): at 05:23

## 2023-05-31 NOTE — PROGRESS NOTE ADULT - PROBLEM SELECTOR PLAN 1
-test BG AC/HS  -Change Lantus to 20 units QAM. First dose in AM on 6/1. Ordered NPH 8 units x 1 tonight to bridge to AM lantus  -Adjust Admelog 28-16-4 w/meals for now based on BG pattern  -c/w Admelog moderate correction scale AC and mod HS scale  -cons carb diet  -Pt on Prednisone 30mg QAM>notify endocrine team when this is further tapered.   Discharge:   Will move Lantus to AM upon discharge as pt has more support from Aide at that time.   Consider 70/30 in AM to reduce insulin burden for patient. Discussed w/pt needs to eat 3 meals/day, pt endorsed aide will ensure he has meal for lunch prior to leaving for the day.   -Can follow at [facility] practice. [address] [facility] [address]. Phone   Aug 15th 10:45am w/Dr Clemens

## 2023-05-31 NOTE — PROGRESS NOTE ADULT - PROBLEM SELECTOR PLAN 4
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

## 2023-05-31 NOTE — PROGRESS NOTE ADULT - PROBLEM SELECTOR PLAN 2
Continue Tacrolimus   Continue Bactrim, valganciclovir- doses adjusted for creatinine clearance   AlloSure pending   RHC with biopsy this week-- date tbd

## 2023-05-31 NOTE — PROGRESS NOTE ADULT - SUBJECTIVE AND OBJECTIVE BOX
Diabetes Follow up note:    Chief complaint: Steroid induced hyperglycemia/DM2    Interval Hx: BG values 100-mid 200s over past 24 hours. Pt seen at bedside. Reports feeling well, pleased with his glucose numbers. Reports some difficulty self-managing DM at home. Aide assisting w/insulin injection in AM but pt has to wait for brother in evening to come home to administer lantus and dinner dose as aide only stays with pt until around 1pm. Pending C later this week.     Review of Systems:  General: denies pain  GI: Tolerating POs. Denies N/V/D/Abd pain  CV: Denies CP/SOB  ENDO: No S&Sx of hypoglycemia    MEDS:  atorvastatin 10 milliGRAM(s) Oral at bedtime  febuxostat 40 milliGRAM(s) Oral daily  insulin glargine Injectable (LANTUS) 18 Unit(s) SubCutaneous at bedtime  insulin lispro (ADMELOG) corrective regimen sliding scale   SubCutaneous at bedtime  insulin lispro (ADMELOG) corrective regimen sliding scale   SubCutaneous three times a day before meals  insulin lispro Injectable (ADMELOG) 4 Unit(s) SubCutaneous before dinner  insulin lispro Injectable (ADMELOG) 16 Unit(s) SubCutaneous before lunch  predniSONE   Tablet 30 milliGRAM(s) Oral daily    nystatin    Suspension 381902 Unit(s) Oral three times a day  trimethoprim   80 mG/sulfamethoxazole 400 mG 1 Tablet(s) Oral daily  valGANciclovir 450 milliGRAM(s) Oral every 48 hours    Allergies    No Known Allergies        PE:  General: Male sitting in chair.  NAD.   Vital Signs Last 24 Hrs  T(C): 36.8 (31 May 2023 11:26), Max: 36.8 (31 May 2023 11:26)  T(F): 98.3 (31 May 2023 11:26), Max: 98.3 (31 May 2023 11:26)  HR: 99 (31 May 2023 11:26) (89 - 99)  BP: 108/74 (31 May 2023 11:26) (108/74 - 128/90)  BP(mean): --  RR: 18 (31 May 2023 11:26) (17 - 18)  SpO2: 99% (31 May 2023 11:26) (94% - 100%)    Parameters below as of 31 May 2023 11:26  Patient On (Oxygen Delivery Method): room air      Abd: Soft, NT,ND,   Extremities: Warm  Neuro: A&O X3    LABS:  POCT Blood Glucose.: 263 mg/dL (05-31-23 @ 11:36)  POCT Blood Glucose.: 131 mg/dL (05-31-23 @ 07:53)  POCT Blood Glucose.: 214 mg/dL (05-30-23 @ 21:46)  POCT Blood Glucose.: 116 mg/dL (05-30-23 @ 17:12)  POCT Blood Glucose.: 128 mg/dL (05-30-23 @ 11:54)  POCT Blood Glucose.: 173 mg/dL (05-30-23 @ 08:14)  POCT Blood Glucose.: 214 mg/dL (05-29-23 @ 22:08)  POCT Blood Glucose.: 154 mg/dL (05-29-23 @ 17:03)  POCT Blood Glucose.: 105 mg/dL (05-29-23 @ 12:13)  POCT Blood Glucose.: 125 mg/dL (05-29-23 @ 08:06)  POCT Blood Glucose.: 139 mg/dL (05-28-23 @ 23:03)  POCT Blood Glucose.: 69 mg/dL (05-28-23 @ 22:11)  POCT Blood Glucose.: 70 mg/dL (05-28-23 @ 22:10)  POCT Blood Glucose.: 250 mg/dL (05-28-23 @ 17:17)  POCT Blood Glucose.: 311 mg/dL (05-28-23 @ 12:14)                            10.5   10.81 )-----------( 205      ( 30 May 2023 09:57 )             35.0       05-31    140  |  106  |  45<H>  ----------------------------<  133<H>  5.5<H>   |  19<L>  |  1.83<H>    eGFR: 38<L>    Ca    8.1<L>      05-31  Mg     2.4     05-30      A1C with Estimated Average Glucose Result: 8.6 % (05-24-23 @ 06:00)  A1C with Estimated Average Glucose Result: 4.7 % (02-17-23 @ 11:59)          Contact number: deanne [phone] or [phone]

## 2023-05-31 NOTE — PROGRESS NOTE ADULT - ASSESSMENT
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  Course c/b by URI w/ human metapneumovirus c/f PNA. He was readmitted from  clinic due to leg edema and two mechanical falls. Since being admitted he was aggressively diuresis and his volume status is much improved. Most recent ECHO revealed reduced EF of 50% (prior ECHO 72%) however his Allosure is normal. He is nearing readiness for discharge, awaiting for home service to be reinstated. hopeful for discharge home within the next day or two.     Cardiac studies:  TTE: 5/19: LVIDd 4.1, EF 51%" Compared to the transthoracic echocardiogram performed on 1/5/2023 There is a decrease in LVEF. On the previous study, the LV was hyperdynamic. There is more turbulence seen in the RV compared to the previous. RV size is mildly dilated but function appears similar.  TTE 3/3/23: LVIDd 3.6, EF 50-55%, concentric remodeling

## 2023-05-31 NOTE — PROGRESS NOTE ADULT - SUBJECTIVE AND OBJECTIVE BOX
no complaints.    GENERAL: No fevers, no chills.  EYES: No blurry vision,  No photophobia  ENT: No sore throat.  No dysphagia  Cardiovascular: No chest pain, palpitations, orthopnea  Pulmonary: No cough, no wheezing. No shortness of breath  Gastrointestinal: No abdominal pain, no diarrhea, no constipation.    Musculoskeletal: No weakness.  No myalgias.  Dermatology:  No rashes.  Neuro: No Headache.  No vertigo.  No dizziness.  Psych: No anxiety, no depression.  Denies suicidal thoughts.    MEDICATIONS  (STANDING):  apixaban 5 milliGRAM(s) Oral every 12 hours  aspirin  chewable 81 milliGRAM(s) Oral daily  atorvastatin 10 milliGRAM(s) Oral at bedtime  dextrose 5%. 1000 milliLiter(s) (100 mL/Hr) IV Continuous <Continuous>  dextrose 5%. 1000 milliLiter(s) (50 mL/Hr) IV Continuous <Continuous>  dextrose 50% Injectable 25 Gram(s) IV Push once  dextrose 50% Injectable 12.5 Gram(s) IV Push once  dextrose 50% Injectable 25 Gram(s) IV Push once  febuxostat 40 milliGRAM(s) Oral daily  folic acid 1 milliGRAM(s) Oral daily  glucagon  Injectable 1 milliGRAM(s) IntraMuscular once  insulin glargine Injectable (LANTUS) 18 Unit(s) SubCutaneous at bedtime  insulin lispro (ADMELOG) corrective regimen sliding scale   SubCutaneous at bedtime  insulin lispro (ADMELOG) corrective regimen sliding scale   SubCutaneous three times a day before meals  insulin lispro Injectable (ADMELOG) 18 Unit(s) SubCutaneous before lunch  insulin lispro Injectable (ADMELOG) 3 Unit(s) SubCutaneous before dinner  insulin lispro Injectable (ADMELOG) 30 Unit(s) SubCutaneous before breakfast  metoprolol succinate  milliGRAM(s) Oral daily  nystatin    Suspension [id number] Unit(s) Oral three times a day  pantoprazole    Tablet 40 milliGRAM(s) Oral before breakfast  predniSONE   Tablet 30 milliGRAM(s) Oral daily  senna 2 Tablet(s) Oral at bedtime  tacrolimus 2.5 milliGRAM(s) Oral two times a day  trimethoprim   80 mG/sulfamethoxazole 400 mG 1 Tablet(s) Oral daily  valGANciclovir 450 milliGRAM(s) Oral every 48 hours    MEDICATIONS  (PRN):  acetaminophen     Tablet .. 650 milliGRAM(s) Oral every 6 hours PRN Temp greater or equal to 38C (100.4F), Mild Pain (1 - 3)  aluminum hydroxide/magnesium hydroxide/simethicone Suspension 30 milliLiter(s) Oral every 4 hours PRN Dyspepsia  dextrose Oral Gel 15 Gram(s) Oral once PRN Blood Glucose LESS THAN 70 milliGRAM(s)/deciliter  melatonin 3 milliGRAM(s) Oral at bedtime PRN Insomnia  ondansetron Injectable 4 milliGRAM(s) IV Push every 8 hours PRN Nausea and/or Vomiting    Vital Signs Last 24 Hrs  T(C): 36.5 (31 May 2023 04:49), Max: 36.7 (30 May 2023 21:40)  T(F): 97.7 (31 May 2023 04:49), Max: 98 (30 May 2023 21:40)  HR: 89 (31 May 2023 04:49) (89 - 97)  BP: 128/90 (31 May 2023 04:49) (126/89 - 128/90)  BP(mean): --  RR: 17 (31 May 2023 04:49) (17 - 18)  SpO2: 98% (31 May 2023 04:49) (94% - 100%)    Parameters below as of 31 May 2023 04:49  Patient On (Oxygen Delivery Method): room air    PHYSICAL EXAM  GENERAL: NAD  HEAD:  Atraumatic, normocephalic  EYES: EOMI, PERRLA, conjunctiva and sclera clear  CHEST/LUNG: Bibasilar rales; no wheezes  HEART: +S1+S2, regular rate and rhythm, 3/6 BRENT  ABDOMEN: Soft, nontender, nondistended; bowel sounds present  EXTREMITIES:  2+ peripheral pulses; no clubbing, cyanosis; 1+ pitting edema b/l LE  PSYCH: AAOx3    .  LABS:                         10.5   10.81 )-----------( 205      ( 30 May 2023 09:57 )             35.0     05-31    140  |  106  |  45<H>  ----------------------------<  133<H>  5.5<H>   |  19<L>  |  1.83<H>    Ca    8.1<L>      31 May 2023 08:55  Mg     2.4     05-30                RADIOLOGY, EKG & ADDITIONAL TESTS: Reviewed.

## 2023-05-31 NOTE — PROGRESS NOTE ADULT - ATTENDING COMMENTS
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     He is euvolemic on exam. Please start bumex 1 mg PO daily for maintenance.     For immunosuppression, continue tacrolimus (goal 5-7). His level was not obtained today so please check tomorrow to determine if dose needs to be further adjusted. Will decrease tacrolimus to 2 mg BID based on level yesterday and rising K. He is off cellcept chronically while on prednisone for hemolytic anemia.     Allosure < 0.12 so will defer repeat biopsy.    Continue bactrim (while watching K), renally dosed valcyte, and nystatin for ppx.     Family meeting held today to address help at home post discharge. He is amenable to receiving increased nursing hours at home.     OK for discharge tomorrow with increased services pending repeat labs tomorrow to adjust tacrolimus and follow K. Will arrange followup in transplant clinic.

## 2023-05-31 NOTE — PROGRESS NOTE ADULT - SUBJECTIVE AND OBJECTIVE BOX
ADVANCED HEART FAILURE & TRANSPLANT- PROGRESS NOTE  *To reach the NS1 Team from 8am to 5pm, please call [phone].  ___________________________________________________________________________    Subjective:  - no issues overnight  - feels well     Medications:  acetaminophen     Tablet .. 650 milliGRAM(s) Oral every 6 hours PRN  aluminum hydroxide/magnesium hydroxide/simethicone Suspension 30 milliLiter(s) Oral every 4 hours PRN  apixaban 5 milliGRAM(s) Oral every 12 hours  aspirin  chewable 81 milliGRAM(s) Oral daily  atorvastatin 10 milliGRAM(s) Oral at bedtime  dextrose 5%. 1000 milliLiter(s) IV Continuous <Continuous>  dextrose 5%. 1000 milliLiter(s) IV Continuous <Continuous>  dextrose 50% Injectable 25 Gram(s) IV Push once  dextrose 50% Injectable 12.5 Gram(s) IV Push once  dextrose 50% Injectable 25 Gram(s) IV Push once  dextrose Oral Gel 15 Gram(s) Oral once PRN  febuxostat 40 milliGRAM(s) Oral daily  folic acid 1 milliGRAM(s) Oral daily  glucagon  Injectable 1 milliGRAM(s) IntraMuscular once  insulin lispro (ADMELOG) corrective regimen sliding scale   SubCutaneous at bedtime  insulin lispro (ADMELOG) corrective regimen sliding scale   SubCutaneous three times a day before meals  insulin lispro Injectable (ADMELOG) 4 Unit(s) SubCutaneous before dinner  insulin lispro Injectable (ADMELOG) 16 Unit(s) SubCutaneous before lunch  insulin NPH human recombinant 8 Unit(s) SubCutaneous once  melatonin 3 milliGRAM(s) Oral at bedtime PRN  metoprolol succinate  milliGRAM(s) Oral daily  nystatin    Suspension [id number] Unit(s) Oral three times a day  ondansetron Injectable 4 milliGRAM(s) IV Push every 8 hours PRN  pantoprazole    Tablet 40 milliGRAM(s) Oral before breakfast  predniSONE   Tablet 30 milliGRAM(s) Oral daily  senna 2 Tablet(s) Oral at bedtime  tacrolimus 2.5 milliGRAM(s) Oral two times a day  trimethoprim   80 mG/sulfamethoxazole 400 mG 1 Tablet(s) Oral daily  valGANciclovir 450 milliGRAM(s) Oral every 48 hours      Vitals:  Vital Signs Last 24 Hours  T(C): 36.8 (05-31-23 @ 11:26), Max: 36.8 (05-31-23 @ 11:26)  HR: 99 (05-31-23 @ 11:26) (89 - 99)  BP: 108/74 (05-31-23 @ 11:26) (108/74 - 128/90)  RR: 18 (05-31-23 @ 11:26) (17 - 18)  SpO2: 99% (05-31-23 @ 11:26) (94% - 99%)        I&O's Summary    30 May 2023 07:01  -  31 May 2023 07:00  --------------------------------------------------------  IN: 480 mL / OUT: 450 mL / NET: 30 mL    31 May 2023 07:01  -  31 May 2023 15:05  --------------------------------------------------------  IN: 520 mL / OUT: 0 mL / NET: 520 mL        Physical Exam  General: No distress. Comfortable.  Neck: Neck supple. JVP not elevated. No masses  Chest: Clear to auscultation bilaterally  CV: Normal S1 and S2. No murmurs, rub, or gallops. Radial pulses normal.  Abdomen: Soft, non-distended, non-tender  Extremities: warm and well perfused   Neurology: Alert and oriented times three.     Labs:                        10.5   10.81 )-----------( 205      ( 30 May 2023 09:57 )             35.0     05-31    140  |  106  |  45<H>  ----------------------------<  133<H>  5.5<H>   |  19<L>  |  1.83<H>    Ca    8.1<L>      31 May 2023 08:55  Mg     2.4     05-30

## 2023-05-31 NOTE — PROGRESS NOTE ADULT - PROBLEM SELECTOR PLAN 1
- readmitted volume overloaded with reduced EF, s/p IV diuretics  - Allosure return (was less than 0.12), no bx needed at this time  -monitor creatinine and electrolytes  -strict I and O  -daily standing weights. - readmitted volume overloaded with reduced EF, s/p IV diuretics  - Allosure return (was less than 0.12), no bx needed at this time  -monitor creatinine and electrolytes  -strict I and O  -daily standing weights.  - start bumex 1 PO daily for maintenance (on lasix 40 BID as outpatient), can increase bumex as outpatient as needed

## 2023-05-31 NOTE — PROGRESS NOTE ADULT - PROBLEM SELECTOR PLAN 5
- will continue to dose tacro for goal 5-7   - please obtain tacro level prior to AM dose  - continue pred 30mg for hemolytic anemia,  -tacro level prior to AM dose

## 2023-05-31 NOTE — PROGRESS NOTE ADULT - PROBLEM SELECTOR PLAN 2
BP goal < 130/80, will defer to primary team      discussed w/pt and team  Can be reached via TEAMS/pager: [phone]  office:  [phone] (M-F 9a-5pm)               [phone] (nights/weekends)   Can access Amion coverage via sunrise/tools

## 2023-05-31 NOTE — PROGRESS NOTE ADULT - PROBLEM SELECTOR PLAN 1
Continue metoprolol  C with cardiac biopsy this week-- discussed with dr starkey 5/31, pending send out blood test results   Diuretics d/c'd due to NORMAN  family meeting 12:30, 5/31

## 2023-05-31 NOTE — PROGRESS NOTE ADULT - NSPROGADDITIONALINFOA_GEN_ALL_CORE
disposition: rhc with biopsy this week  discused with dr ling 5/31  discussed with debra Silva D.O.  available on MS teams

## 2023-05-31 NOTE — PROGRESS NOTE ADULT - ASSESSMENT
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  He was seen by the Endo during previous admission in 3/2023. BG values variable on current insulin regimen. Discussed pt's difficulty regarding insulin schedule/home assistance as pt asking for dc plan that will be simple for him to follow. BG goal (100-180mg/dl).

## 2023-06-01 ENCOUNTER — TRANSCRIPTION ENCOUNTER (OUTPATIENT)
Age: 73
End: 2023-06-01

## 2023-06-01 DIAGNOSIS — E87.5 HYPERKALEMIA: ICD-10-CM

## 2023-06-01 LAB
ANION GAP SERPL CALC-SCNC: 12 MMOL/L — SIGNIFICANT CHANGE UP (ref 5–17)
BUN SERPL-MCNC: 44 MG/DL — HIGH (ref 7–23)
CALCIUM SERPL-MCNC: 8.6 MG/DL — SIGNIFICANT CHANGE UP (ref 8.4–10.5)
CHLORIDE SERPL-SCNC: 106 MMOL/L — SIGNIFICANT CHANGE UP (ref 96–108)
CO2 SERPL-SCNC: 22 MMOL/L — SIGNIFICANT CHANGE UP (ref 22–31)
CREAT SERPL-MCNC: 2.02 MG/DL — HIGH (ref 0.5–1.3)
EGFR: 34 ML/MIN/1.73M2 — LOW
GLUCOSE BLDC GLUCOMTR-MCNC: 103 MG/DL — HIGH (ref 70–99)
GLUCOSE BLDC GLUCOMTR-MCNC: 152 MG/DL — HIGH (ref 70–99)
GLUCOSE BLDC GLUCOMTR-MCNC: 188 MG/DL — HIGH (ref 70–99)
GLUCOSE BLDC GLUCOMTR-MCNC: 240 MG/DL — HIGH (ref 70–99)
GLUCOSE SERPL-MCNC: 223 MG/DL — HIGH (ref 70–99)
POTASSIUM SERPL-MCNC: 5.9 MMOL/L — HIGH (ref 3.5–5.3)
POTASSIUM SERPL-SCNC: 5.9 MMOL/L — HIGH (ref 3.5–5.3)
SODIUM SERPL-SCNC: 140 MMOL/L — SIGNIFICANT CHANGE UP (ref 135–145)
TACROLIMUS SERPL-MCNC: 7 NG/ML — SIGNIFICANT CHANGE UP

## 2023-06-01 PROCEDURE — 99239 HOSP IP/OBS DSCHRG MGMT >30: CPT

## 2023-06-01 PROCEDURE — 99233 SBSQ HOSP IP/OBS HIGH 50: CPT

## 2023-06-01 PROCEDURE — 99232 SBSQ HOSP IP/OBS MODERATE 35: CPT

## 2023-06-01 RX ORDER — BUMETANIDE 0.25 MG/ML
1 INJECTION INTRAMUSCULAR; INTRAVENOUS ONCE
Refills: 0 | Status: COMPLETED | OUTPATIENT
Start: 2023-06-01 | End: 2023-06-01

## 2023-06-01 RX ORDER — SODIUM ZIRCONIUM CYCLOSILICATE 10 G/10G
10 POWDER, FOR SUSPENSION ORAL
Refills: 0 | Status: DISCONTINUED | OUTPATIENT
Start: 2023-06-01 | End: 2023-06-02

## 2023-06-01 RX ORDER — PATIROMER 8.4 G/1
8.4 POWDER, FOR SUSPENSION ORAL DAILY
Refills: 0 | Status: DISCONTINUED | OUTPATIENT
Start: 2023-06-01 | End: 2023-06-01

## 2023-06-01 RX ORDER — SODIUM ZIRCONIUM CYCLOSILICATE 10 G/10G
5 POWDER, FOR SUSPENSION ORAL ONCE
Refills: 0 | Status: COMPLETED | OUTPATIENT
Start: 2023-06-01 | End: 2023-06-01

## 2023-06-01 RX ORDER — INSULIN LISPRO 100/ML
14 VIAL (ML) SUBCUTANEOUS
Refills: 0 | Status: DISCONTINUED | OUTPATIENT
Start: 2023-06-02 | End: 2023-06-02

## 2023-06-01 RX ORDER — FUROSEMIDE 40 MG/1
40 TABLET ORAL
Qty: 30 | Refills: 5 | Status: DISCONTINUED | COMMUNITY
Start: 2023-05-19 | End: 2023-06-01

## 2023-06-01 RX ORDER — INSULIN LISPRO 100/ML
30 VIAL (ML) SUBCUTANEOUS
Refills: 0 | Status: DISCONTINUED | OUTPATIENT
Start: 2023-06-02 | End: 2023-06-02

## 2023-06-01 RX ADMIN — BUMETANIDE 1 MILLIGRAM(S): 0.25 INJECTION INTRAMUSCULAR; INTRAVENOUS at 05:37

## 2023-06-01 RX ADMIN — Medication 16 UNIT(S): at 11:54

## 2023-06-01 RX ADMIN — Medication 200 MILLIGRAM(S): at 05:38

## 2023-06-01 RX ADMIN — Medication 500000 UNIT(S): at 05:38

## 2023-06-01 RX ADMIN — Medication 500000 UNIT(S): at 15:27

## 2023-06-01 RX ADMIN — INSULIN GLARGINE 20 UNIT(S): 100 INJECTION, SOLUTION SUBCUTANEOUS at 08:53

## 2023-06-01 RX ADMIN — PANTOPRAZOLE SODIUM 40 MILLIGRAM(S): 20 TABLET, DELAYED RELEASE ORAL at 05:38

## 2023-06-01 RX ADMIN — Medication 1 MILLIGRAM(S): at 11:53

## 2023-06-01 RX ADMIN — ATORVASTATIN CALCIUM 10 MILLIGRAM(S): 80 TABLET, FILM COATED ORAL at 21:26

## 2023-06-01 RX ADMIN — APIXABAN 5 MILLIGRAM(S): 2.5 TABLET, FILM COATED ORAL at 17:13

## 2023-06-01 RX ADMIN — Medication 4 UNIT(S): at 17:14

## 2023-06-01 RX ADMIN — Medication 28 UNIT(S): at 08:55

## 2023-06-01 RX ADMIN — Medication 4: at 11:54

## 2023-06-01 RX ADMIN — Medication 81 MILLIGRAM(S): at 11:53

## 2023-06-01 RX ADMIN — Medication 2: at 17:14

## 2023-06-01 RX ADMIN — SODIUM ZIRCONIUM CYCLOSILICATE 5 GRAM(S): 10 POWDER, FOR SUSPENSION ORAL at 21:26

## 2023-06-01 RX ADMIN — TACROLIMUS 2 MILLIGRAM(S): 5 CAPSULE ORAL at 09:09

## 2023-06-01 RX ADMIN — Medication 30 MILLIGRAM(S): at 05:38

## 2023-06-01 RX ADMIN — SODIUM ZIRCONIUM CYCLOSILICATE 10 GRAM(S): 10 POWDER, FOR SUSPENSION ORAL at 15:31

## 2023-06-01 RX ADMIN — Medication 1 DROP(S): at 17:13

## 2023-06-01 RX ADMIN — FEBUXOSTAT 40 MILLIGRAM(S): 40 TABLET ORAL at 11:51

## 2023-06-01 RX ADMIN — TACROLIMUS 2 MILLIGRAM(S): 5 CAPSULE ORAL at 17:14

## 2023-06-01 RX ADMIN — BUMETANIDE 1 MILLIGRAM(S): 0.25 INJECTION INTRAMUSCULAR; INTRAVENOUS at 15:30

## 2023-06-01 RX ADMIN — Medication 500000 UNIT(S): at 21:25

## 2023-06-01 RX ADMIN — APIXABAN 5 MILLIGRAM(S): 2.5 TABLET, FILM COATED ORAL at 05:38

## 2023-06-01 RX ADMIN — Medication 1 TABLET(S): at 11:51

## 2023-06-01 RX ADMIN — Medication 2: at 08:53

## 2023-06-01 NOTE — CONSULT NOTE ADULT - ASSESSMENT
Assessment and Recommendations:  73y male w/ pmhx/ochx of [facility] s/p heart transplant 2018 complicated by graft dysfunction treated by plasmapheresis, IVIG, and rituximab, admitted with acute on chronic systolic heart failure. Ophthalmology consulted for eye redness OD, found to have AARON OD.     # subconjunctival hemorrhage OD  - denies trauma, coughing, or heavy lifting. Pt on eliquis and ASA, can continue as medically necessary  - subconjunctival hemorrhage will resolve on its own with time  - start preservative free artificial tears, one drop four times a day, to both eyes for comfort    Ophthalmology signing off. Please reconsult prn.  Pt seen and discussed with Dr. Mckeon.       Outpatient follow-up: Patient should follow-up with his/her ophthalmologist or with [facility] of Ophthalmology upon discharge at the address below     [address] [place] [address]. [address]  [place] [address]  [phone]

## 2023-06-01 NOTE — PROGRESS NOTE ADULT - SUBJECTIVE AND OBJECTIVE BOX
seen earlier today     Chief Complaint: Diabetes Mellitus follow up    INTERVAL HX: tolerating po,  right conjunctiva appears injected RN at bedside per RN team aware, fbg abovegoal, prandial BG persistently abovegoal ac lunch, tightly controlled dinner , pt amenable to 4 injections a day at home now with expanded aide hours at home       Review of Systems:  General: As above  GI: No nausea, vomiting  Endocrine: no  S&Sx of hypoglycemia    Allergies    No Known Allergies    Intolerances      MEDICATIONS  (STANDING):  apixaban 5 milliGRAM(s) Oral every 12 hours  aspirin  chewable 81 milliGRAM(s) Oral daily  atorvastatin 10 milliGRAM(s) Oral at bedtime  buMETAnide 1 milliGRAM(s) Oral daily  buMETAnide 1 milliGRAM(s) Oral once  dextrose 5%. 1000 milliLiter(s) (100 mL/Hr) IV Continuous <Continuous>  dextrose 5%. 1000 milliLiter(s) (50 mL/Hr) IV Continuous <Continuous>  dextrose 50% Injectable 25 Gram(s) IV Push once  dextrose 50% Injectable 12.5 Gram(s) IV Push once  dextrose 50% Injectable 25 Gram(s) IV Push once  febuxostat 40 milliGRAM(s) Oral daily  folic acid 1 milliGRAM(s) Oral daily  glucagon  Injectable 1 milliGRAM(s) IntraMuscular once  insulin glargine Injectable (LANTUS) 20 Unit(s) SubCutaneous every morning  insulin lispro (ADMELOG) corrective regimen sliding scale   SubCutaneous at bedtime  insulin lispro (ADMELOG) corrective regimen sliding scale   SubCutaneous three times a day before meals  insulin lispro Injectable (ADMELOG) 16 Unit(s) SubCutaneous before lunch  insulin lispro Injectable (ADMELOG) 28 Unit(s) SubCutaneous before breakfast  insulin lispro Injectable (ADMELOG) 4 Unit(s) SubCutaneous before dinner  metoprolol succinate  milliGRAM(s) Oral daily  nystatin    Suspension 169826 Unit(s) Oral three times a day  pantoprazole    Tablet 40 milliGRAM(s) Oral before breakfast  predniSONE   Tablet 30 milliGRAM(s) Oral daily  senna 2 Tablet(s) Oral at bedtime  sodium zirconium cyclosilicate 10 Gram(s) Oral <User Schedule>  tacrolimus 2 milliGRAM(s) Oral two times a day  trimethoprim   80 mG/sulfamethoxazole 400 mG 1 Tablet(s) Oral daily  valGANciclovir 450 milliGRAM(s) Oral every 48 hours      atorvastatin   10 milliGRAM(s) Oral (05-31-23 @ 21:57)    febuxostat   40 milliGRAM(s) Oral (06-01-23 @ 11:51)    insulin glargine Injectable (LANTUS)   20 Unit(s) SubCutaneous (06-01-23 @ 08:53)    insulin lispro (ADMELOG) corrective regimen sliding scale   4 Unit(s) SubCutaneous (06-01-23 @ 11:54)   2 Unit(s) SubCutaneous (06-01-23 @ 08:53)    insulin lispro Injectable (ADMELOG)   16 Unit(s) SubCutaneous (06-01-23 @ 11:54)    insulin lispro Injectable (ADMELOG)   28 Unit(s) SubCutaneous (06-01-23 @ 08:55)    insulin lispro Injectable (ADMELOG)   4 Unit(s) SubCutaneous (05-31-23 @ 17:34)    insulin NPH human recombinant   8 Unit(s) SubCutaneous (05-31-23 @ 21:56)    predniSONE   Tablet   30 milliGRAM(s) Oral (06-01-23 @ 05:38)        PHYSICAL EXAM:  VITALS: T(C): 36.6 (06-01-23 @ 12:32)  T(F): 97.9 (06-01-23 @ 12:32), Max: 97.9 (06-01-23 @ 12:32)  HR: 96 (06-01-23 @ 12:32) (91 - 101)  BP: 107/72 (06-01-23 @ 12:32) (107/72 - 131/84)  RR:  (18 - 18)  SpO2:  (98% - 99%)  Wt(kg): --  GENERAL: male sitting in chair in NAD, right conjunctiva injected   Respiratory: Respirations unlabored   Extremities: Warm, no edema  NEURO: Alert , appropriate     LABS:  POCT Blood Glucose.: 240 mg/dL (06-01-23 @ 11:36)  POCT Blood Glucose.: 188 mg/dL (06-01-23 @ 08:02)  POCT Blood Glucose.: 147 mg/dL (05-31-23 @ 21:54)  POCT Blood Glucose.: 99 mg/dL (05-31-23 @ 17:11)  POCT Blood Glucose.: 263 mg/dL (05-31-23 @ 11:36)  POCT Blood Glucose.: 131 mg/dL (05-31-23 @ 07:53)  POCT Blood Glucose.: 214 mg/dL (05-30-23 @ 21:46)  POCT Blood Glucose.: 116 mg/dL (05-30-23 @ 17:12)  POCT Blood Glucose.: 128 mg/dL (05-30-23 @ 11:54)  POCT Blood Glucose.: 173 mg/dL (05-30-23 @ 08:14)  POCT Blood Glucose.: 214 mg/dL (05-29-23 @ 22:08)  POCT Blood Glucose.: 154 mg/dL (05-29-23 @ 17:03)                          10.5   10.81 )-----------( 205      ( 30 May 2023 09:57 )             35.0     06-01    140  |  106  |  44<H>  ----------------------------<  223<H>  5.9<H>   |  22  |  2.02<H>    Ca    8.6      01 Jun 2023 08:57      eGFR: 34 mL/min/1.73m2 (01 Jun 2023 08:57)      Thyroid Function Tests:      A1C with Estimated Average Glucose Result: 8.6 % (05-24-23 @ 06:00)    Estimated Average Glucose: 200 mg/dL (05-24-23 @ 06:00)        Diet, Regular:   Consistent Carbohydrate No Snacks (CSTCHO)  DASH/TLC Sodium & Cholesterol Restricted (DASH) (05-23-23 @ 20:36) [Active]

## 2023-06-01 NOTE — DISCHARGE NOTE PROVIDER - NSDCFUADDAPPT_GEN_ALL_CORE_FT
APPTS ARE READY TO BE MADE: [ x] YES    Best Family or Patient Contact (if needed):    Additional Information about above appointments (if needed):    1: CHF Team   2:   3:     Other comments or requests:    APPTS ARE READY TO BE MADE: [ x] YES    Best Family or Patient Contact (if needed):    Additional Information about above appointments (if needed):    1: CHF Team   2:   3:     Other comments or requests:    Patient was advised of follow up requests and asked for scheduling assistance. Will await a call back from Dr. Marvin Campbell's office to confirm appointment details.    APPTS ARE READY TO BE MADE: [ x] YES    Best Family or Patient Contact (if needed):    Additional Information about above appointments (if needed):    1: CHF Team.   2: Follow up with Endocrine for diabetes management.  3:     Other comments or requests:    Patient was advised of follow up requests and asked for scheduling assistance. Will await a call back from Dr. Marvin Campbell's office to confirm appointment details.    APPTS ARE READY TO BE MADE: [ x] YES    Best Family or Patient Contact (if needed):    Additional Information about above appointments (if needed):    1: CHF Team.   2: Follow up with Endocrine for diabetes management.  3:     Other comments or requests:    Patient is scheduled to see Dr. Campbell at 3:20PM on 6/20/23 at [address] [place] [address]    Patient is scheduled to see Dr. Rosario at 8:30AM on 6/12/23 at [address] [place] [address]    Patient was previously scheduled to see Dr. Clemens at 10:45AM on 8/15/23 at [address] [place] [id number]

## 2023-06-01 NOTE — PROGRESS NOTE ADULT - ASSESSMENT
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  He was seen by the Endo during previous admission in 3/2023. BG values variable on current insulin regimen. Discussed pt's difficulty regarding insulin schedule/home assistance as pt asking for dc plan that will be simple for him to follow, amenable to basal bolus . BG goal (100-180mg/dl).

## 2023-06-01 NOTE — PROGRESS NOTE ADULT - NSPROGADDITIONALINFOA_GEN_ALL_CORE
disposition: dc today  1 hour spent in preparation of discharge    discussed with dr bojorquez 5/31  discussed with acp    Wanda Silva D.O.  available on MS teams

## 2023-06-01 NOTE — DISCHARGE NOTE PROVIDER - NSDCCPCAREPLAN_GEN_ALL_CORE_FT
PRINCIPAL DISCHARGE DIAGNOSIS  Diagnosis: CHF exacerbation  Assessment and Plan of Treatment: Weigh yourself daily.  If you gain 3lbs in 3 days, or 5lbs in a week call your Health Care Provider.  Do not eat or drink foods containing more than 2000mg of salt (sodium) in your diet every day.  Call your Health Care Provider if you have any swelling or increased swelling in your feet, ankles, and/or stomach.  Take all of your medication as directed.  If you become dizzy call your Health Care Provider.      SECONDARY DISCHARGE DIAGNOSES  Diagnosis: Atrial fibrillation and flutter  Assessment and Plan of Treatment: Atrial fibrillation is the most common heart rhythm problem.  The condition puts you at risk for has stroke and heart attack  It helps if you control your blood pressure, not drink more than 1-2 alcohol drinks per day, cut down on caffeine, getting treatment for over active thyroid gland, and get regular exercise  Call your doctor if you feel your heart racing or beating unusually, chest tightness or pain, lightheaded, faint, shortness of breath especially with exercise  It is important to take your heart medication as prescribed  You may be on anticoagulation which is very important to take as directed - you may need blood work to monitor drug levels    Diagnosis: HTN (hypertension)  Assessment and Plan of Treatment: Low salt diet  Activity as tolerated.  Take all medication as prescribed.  Follow up with your medical doctor for routine blood pressure monitoring at your next visit.  Notify your doctor if you have any of the following symptoms:   Dizziness, Lightheadedness, Blurry vision, Headache, Chest pain, Shortness of breath     PRINCIPAL DISCHARGE DIAGNOSIS  Diagnosis: CHF exacerbation  Assessment and Plan of Treatment: Weigh yourself daily.  If you gain 3lbs in 3 days, or 5lbs in a week call your Health Care Provider.  Do not eat or drink foods containing more than 2000mg of salt (sodium) in your diet every day.  Call your Health Care Provider if you have any swelling or increased swelling in your feet, ankles, and/or stomach.  Take all of your medication as directed.  If you become dizzy call your Health Care Provider.      SECONDARY DISCHARGE DIAGNOSES  Diagnosis: HTN (hypertension)  Assessment and Plan of Treatment: Low salt diet  Activity as tolerated.  Take all medication as prescribed.  Follow up with your medical doctor for routine blood pressure monitoring at your next visit.  Notify your doctor if you have any of the following symptoms:   Dizziness, Lightheadedness, Blurry vision, Headache, Chest pain, Shortness of breath    Diagnosis: Atrial fibrillation and flutter  Assessment and Plan of Treatment: Atrial fibrillation is the most common heart rhythm problem.  The condition puts you at risk for has stroke and heart attack  It helps if you control your blood pressure, not drink more than 1-2 alcohol drinks per day, cut down on caffeine, getting treatment for over active thyroid gland, and get regular exercise  Call your doctor if you feel your heart racing or beating unusually, chest tightness or pain, lightheaded, faint, shortness of breath especially with exercise  It is important to take your heart medication as prescribed  You may be on anticoagulation which is very important to take as directed - you may need blood work to monitor drug levels    Diagnosis: Conjunctival hemorrhage, right  Assessment and Plan of Treatment: Continue with artifical tears. Follow up with Opthalomologist as an outpatient.

## 2023-06-01 NOTE — DISCHARGE NOTE PROVIDER - CARE PROVIDER_API CALL
Marvin Campbell  Cardiovascular Disease  [address] [place] [address]  Phone: [phone]  Fax: [phone]  Follow Up Time: 2 weeks   Marvin Campbell  Cardiovascular Disease  [address] [place] [address]  Phone: [phone]  Fax: [phone]  Follow Up Time: 2 weeks    Tao Rosario  Hematology  [address] [place] [id number]  Phone: [phone]  Fax: [phone]  Follow Up Time: 1 week    Ricardo Clemens  Endocrinology/Metab/Diabetes  [address] [place] [id number]  Phone: [phone]  Fax: [phone]  Follow Up Time:

## 2023-06-01 NOTE — PROGRESS NOTE ADULT - PROBLEM SELECTOR PLAN 7
BP controlled  Holding Losartan due to NORMAN D/C'd losartan  Renal US- no hydro  Renally dose meds- valganciclovir and Bactrim adjusted for creatinine clearance  Daily BMP

## 2023-06-01 NOTE — PROGRESS NOTE ADULT - PROBLEM SELECTOR PLAN 2
-overall improving, baseline creatinine 1.5.   -diuretics as stated above  - please check afternoon BMP

## 2023-06-01 NOTE — PROGRESS NOTE ADULT - PROBLEM SELECTOR PLAN 6
D/C'd losartan  Renal US- no hydro  Renally dose meds- valganciclovir and Bactrim adjusted for creatinine clearance  Daily BMP fs controlled  fs achs  Endocrine following  c/w Humalog to 34-16-5 and Lantus to 16 U QHS with sliding scale  c/w carb controlled diet/ dash

## 2023-06-01 NOTE — PROGRESS NOTE ADULT - ATTENDING COMMENTS
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     He appears minimally overloaded on exam, increase bumex to 1 mg PO BID today then resume at 1 mg daily tomorrow. Previously on lasix at home.    For immunosuppression, continue tacrolimus (goal 5-7). He is off cellcept chronically while on prednisone for hemolytic anemia.     Allosure < 0.12 so will defer repeat biopsy.    Potassium rising as he is off his veltassa, please resume either veltassa or equivalent of lokelma. Repeat BMP in the afternoon.     Continue bactrim (unless K doesn't decrease on K lowering agents), renally dosed valcyte, and nystatin for ppx.     Family meeting held to address help at home post discharge. He is amenable to receiving increased nursing hours at home.     OK for discharge tomorrow with increased services pending potassium and ophtho evaluation for possible conjunctival hemorrhage.

## 2023-06-01 NOTE — DISCHARGE NOTE PROVIDER - NSDCMRMEDTOKEN_GEN_ALL_CORE_FT
Admelog 100 units/mL injectable solution: 6 unit(s) injectable once a day  Admelog 100 units/mL injectable solution: 26 unit(s) injectable once a day (with breakfast)  Admelog 100 units/mL injectable solution: 10 unit(s) injectable once a day (with lunch)  apixaban 5 mg oral tablet: 1 tab(s) orally every 12 hours  aspirin 81 mg oral tablet: 1 tab(s) orally once a day  febuxostat 40 mg oral tablet: 1 tab(s) orally once a day  folic acid 1 mg oral tablet: 1 tab(s) orally once a day  insulin glargine 100 units/mL subcutaneous solution: 18 unit(s) subcutaneous once a day (at bedtime)  losartan 25 mg oral tablet: 1 tab(s) orally once a day  metoprolol succinate 200 mg oral tablet, extended release: 1 tab(s) orally once a day  nystatin 100,000 units/mL oral suspension: 5 milliliter(s) orally 3 times a day  pantoprazole 40 mg oral delayed release tablet: 1 tab(s) orally once a day (before a meal)  pravastatin 20 mg oral tablet: 1 tab(s) orally once a day  predniSONE 20 mg oral tablet: 2 tab(s) orally once a day  Rolling Walker: CONNIE: 99  ICD: M62.81  sulfamethoxazole-trimethoprim 400 mg-80 mg oral tablet: 1 tab(s) orally every 24 hours  tacrolimus 0.5 mg oral capsule: 5 cap(s) orally 2 times a day   valGANciclovir 450 mg oral tablet: 1 tab(s) orally once a day   Admelog 100 units/mL injectable solution: 30 solution injectable once a day 30 unit(s) injectable once a day (with breakfast)  Admelog 100 units/mL injectable solution: 14 unit(s) injectable once a day (with lunch)  Admelog 100 units/mL injectable solution: 4 unit(s) injectable once a day  apixaban 5 mg oral tablet: 1 tab(s) orally every 12 hours  aspirin 81 mg oral tablet: 1 tab(s) orally once a day  bumetanide 1 mg oral tablet: 1 tab(s) orally once a day  febuxostat 40 mg oral tablet: 1 tab(s) orally once a day  folic acid 1 mg oral tablet: 1 tab(s) orally once a day  insulin glargine 100 units/mL subcutaneous solution: 20 unit(s) subcutaneous once a day  metoprolol succinate 200 mg oral tablet, extended release: 1 tab(s) orally once a day  nystatin 100,000 units/mL oral suspension: 5 milliliter(s) orally 3 times a day  ocular lubricant ophthalmic solution: 1 drop(s) to each affected eye 4 times a day  pantoprazole 40 mg oral delayed release tablet: 1 tab(s) orally once a day (before a meal)  pravastatin 20 mg oral tablet: 1 tab(s) orally once a day  predniSONE 10 mg oral tablet: 3 tab(s) orally once a day  senna leaf extract oral tablet: 2 tab(s) orally once a day (at bedtime)  sulfamethoxazole-trimethoprim 400 mg-80 mg oral tablet: 1 tab(s) orally every 24 hours  tacrolimus 1 mg oral capsule: 2 cap(s) orally 2 times a day  valGANciclovir 450 mg oral tablet: 1 tab(s) orally once a day  Veltassa 16.8 g oral powder for reconstitution: 16.8 orally 2 times a day

## 2023-06-01 NOTE — DISCHARGE NOTE PROVIDER - PROVIDER TOKENS
PROVIDER:[TOKEN:[[id number]],FOLLOWUP:[2 weeks]] PROVIDER:[TOKEN:[[id number]],FOLLOWUP:[2 weeks]],PROVIDER:[TOKEN:[[id number]],FOLLOWUP:[1 week]],PROVIDER:[TOKEN:[[id number]]]

## 2023-06-01 NOTE — CHART NOTE - NSCHARTNOTEFT_GEN_A_CORE
An attempt was made to reach patient, which has been unsuccessful. Unable to leave voicemail on 6/1.

## 2023-06-01 NOTE — PROGRESS NOTE ADULT - PROBLEM SELECTOR PLAN 4
Continue Apixaban, metoprolol  Currently in SR Heme eval appreciated  Continue Prednisone 30 mg PO daily, no role for rituximab   Daily CBC

## 2023-06-01 NOTE — DISCHARGE NOTE PROVIDER - HOSPITAL COURSE
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  Course c/b by URI w/ human metapneumovirus c/f PNA. He was readmitted from  clinic due to leg edema and two mechanical falls. Since being admitted he was aggressively diuresis and his volume status is much improved. Most recent ECHO revealed reduced EF of 50% (prior ECHO 72%) however his Allosure is normal. He is nearing readiness for discharge, awaiting for home service to be reinstated. hopeful for discharge home within the next day or two.      Problem/Plan - 1:  ·  Problem: Volume overload.   ·  Plan: - readmitted volume overloaded with reduced EF, s/p IV diuretics  - Allosure return (was less than 0.12), no bx needed at this time  -monitor creatinine and electrolytes  -strict I and O  -daily standing weights.  - start bumex 1 PO daily for maintenance (on lasix 40 BID as outpatient), can increase bumex as outpatient as needed.     Problem/Plan - 2:  ·  Problem: NORMAN (acute kidney injury).   ·  Plan: -overall improving, baseline creatinine 1.5.   - currently off diuretics.     Problem/Plan - 3:  ·  Problem: Atrial fibrillation and flutter.   ·  Plan: -continue with eliquis 5mg BID  -metoprolol xl 200mg daily.     Problem/Plan - 4:  ·  Problem: S/P orthotopic heart transplant.   ·  Plan: -5 years out, transplant on 2/23/2018, from HM2 VAD due to pump thrombosis  -stable coronary cameral fistula  -see immunosuppression below  -family meeting held today  - will need to arrange for home PT.     Problem/Plan - 5:  ·  Problem: Immunosuppression.   ·  Plan: - will continue to dose tacro for goal 5-7   - please obtain tacro level prior to AM dose  - continue pred 30mg for hemolytic anemia,  -tacro level prior to AM dose.     Problem/Plan - 6:  ·  Problem: Prophylactic measure.   ·  Plan: -CMV: valgancyclovir 450 daily  -Toxo/PCP: bactrim 400-80 POD  -CAV: pravastatin 20mg daily, ASA 81mg   -Fungal: nystatin.     Problem/Plan - 7:  ·  Problem: Hypertension.   ·  Plan: -holding losartan given NORMAN  - currently normotensive.     Problem/Plan - 8:  ·  Problem: Nicole' syndrome.   ·  Plan: -seen by jarred, continue pred, stated no role for rituximab at this time  -he will follow up with Dr. Rosario outpatient.    Attestation Statements:    Attestation Statements:  I have personally seen and examined the patient.  I fully participated in the care of this patient.  I have made amendments to the documentation where necessary, and agree with the history, physical exam, and plan as documented by the Fellow.     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     He is euvolemic on exam. Please start bumex 1 mg PO daily for maintenance.     For immunosuppression, continue tacrolimus (goal 5-7). His level was not obtained today so please check tomorrow to determine if dose needs to be further adjusted. Will decrease tacrolimus to 2 mg BID based on level yesterday and rising K. He is off cellcept chronically while on prednisone for hemolytic anemia.     Allosure < 0.12 so will defer repeat biopsy.    Continue bactrim (while watching K), renally dosed valcyte, and nystatin for ppx.     Family meeting held today to address help at home post discharge. He is amenable to receiving increased nursing hours at home.     OK for discharge tomorrow with increased services pending repeat labs tomorrow to adjust tacrolimus and follow K. Will arrange followup in transplant clinic.        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  Course c/b by URI w/ human metapneumovirus c/f PNA. He was readmitted from  clinic due to leg edema and two mechanical falls. Since being admitted he was aggressively diuresis and his volume status is much improved. Most recent ECHO revealed reduced EF of 50% (prior ECHO 72%) however his Allosure is normal. He is nearing readiness for discharge, awaiting for home service to be reinstated. hopeful for discharge home within the next day or two.     ·  Problem: Volume overload.   ·  Plan: - readmitted volume overloaded with reduced EF, s/p IV diuretics  - Allosure return (was less than 0.12), no bx needed at this time  -monitor creatinine and electrolytes  -strict I and O  -daily standing weights.  - start bumex 1 PO daily for maintenance (on lasix 40 BID as outpatient), can increase bumex as outpatient as needed.    ·  Problem: NORMAN (acute kidney injury).   ·  Plan: -overall improving, baseline creatinine 1.5.   - currently off diuretics.    ·  Problem: Atrial fibrillation and flutter.   ·  Plan: -continue with eliquis 5mg BID  -metoprolol xl 200mg daily.    ·  Problem: S/P orthotopic heart transplant.   ·  Plan: -5 years out, transplant on 2/23/2018, from HM2 VAD due to pump thrombosis  -stable coronary cameral fistula  -see immunosuppression below  -family meeting held today  - will need to arrange for home PT.    ·  Problem: Immunosuppression.   ·  Plan: - will continue to dose tacro for goal 5-7   - please obtain tacro level prior to AM dose  - continue pred 30mg for hemolytic anemia,  -tacro level prior to AM dose.    ·  Problem: Prophylactic measure.   ·  Plan: -CMV: valgancyclovir 450 daily  -Toxo/PCP: bactrim 400-80 POD  -CAV: pravastatin 20mg daily, ASA 81mg   -Fungal: nystatin.    ·  Problem: Hypertension.   ·  Plan: -holding losartan given NORMAN  - currently normotensive.    ·  Problem: Nicole' syndrome.   ·  Plan: -seen by jarred, continue pred, stated no role for rituximab at this time  -he will follow up with Dr. Rosario outpatient.    Attestation Statements:    Attestation Statements:  I have personally seen and examined the patient.  I fully participated in the care of this patient.  I have made amendments to the documentation where necessary, and agree with the history, physical exam, and plan as documented by the Fellow.     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     He is euvolemic on exam. Please start bumex 1 mg PO daily for maintenance.     For immunosuppression, continue tacrolimus (goal 5-7). His level was not obtained today so please check tomorrow to determine if dose needs to be further adjusted. Will decrease tacrolimus to 2 mg BID based on level yesterday and rising K. He is off cellcept chronically while on prednisone for hemolytic anemia.     Allosure < 0.12 so will defer repeat biopsy.    Continue bactrim (while watching K), renally dosed valcyte, and nystatin for ppx.     Family meeting held today to address help at home post discharge. He is amenable to receiving increased nursing hours at home.     OK for discharge tomorrow with increased services pending repeat labs tomorrow to adjust tacrolimus and follow K. Will arrange followup in transplant clinic.

## 2023-06-01 NOTE — PROGRESS NOTE ADULT - SUBJECTIVE AND OBJECTIVE BOX
CHRIS:  37w5d    See prenatal flow    A/P: 25 y.o.  @ 37w5d   - Previous c/s X 3- desires repeat (scheduled 3/24)  - desires permanent sterilization    Discussed with the patient indications for  delivery. The patient voiced understanding of indications for  section at this time.    Discussed with the patient the risks of  delivery. The risks include bleeding, infection, transfusion, emergency hysterectomy to control bleeding, damage to surrounding organs (bowel, bladder, ureters, nerves, vessels), need for repair or future surgery, fetal injury, unexpected pathology, anesthesia risks, and rarely death.  I also discussed with the patient the risk of wound infection, wound breakdown, thromboembolic events, and scarring. We discussed that these risks are greater in people that have diabetes or obesity. We discussed that these risks are also greater with multiple previous c/sections.   We also discussed bilateral salpingectomy and that this a permanent, nonreversible procedure. Discussed permanent sterilization and the risk of regret.  Patient reports she is sure she does not desire future childbearing.  Discussed that there are different ways to perform a sterilization procedure.  Typically, recommend removal of tubes, as opposed to burning or placing occlusive device, for possible future decreased risk of ovarian cancer.  Also should have a theoretically smaller risk of failure, or future pregnancy.  Discussed that that risk however is never 0 and that there is always a small risk of pregnancy after tubal ligation of any kind.  Discussed that taking the tubes out rather than occluding the tubes could theoretically increase the risks of procedure including increased risk of blood loss, intraoperative time, and damage to surrounding structures, however that risk when studied seems to be negligible.  Patient amenable to sterilization via bilateral salpingectomy.     The patient had the  opportunity to ask questions regarding the procedure. All questions answered to the patient's satisfaction. Plan to proceed with  delivery and bilateral salpingectomy as scheduled on 3/24/23.        ADVANCED HEART FAILURE & TRANSPLANT- PROGRESS NOTE  *To reach the NS1 Team from 8am to 5pm, please call [phone].  ___________________________________________________________________________    Subjective:  - this AM woke up with a red eye, denies any itchiness or trauma      Medications:  acetaminophen     Tablet .. 650 milliGRAM(s) Oral every 6 hours PRN  aluminum hydroxide/magnesium hydroxide/simethicone Suspension 30 milliLiter(s) Oral every 4 hours PRN  apixaban 5 milliGRAM(s) Oral every 12 hours  aspirin  chewable 81 milliGRAM(s) Oral daily  atorvastatin 10 milliGRAM(s) Oral at bedtime  buMETAnide 1 milliGRAM(s) Oral once  buMETAnide 1 milliGRAM(s) Oral daily  dextrose 5%. 1000 milliLiter(s) IV Continuous <Continuous>  dextrose 5%. 1000 milliLiter(s) IV Continuous <Continuous>  dextrose 50% Injectable 25 Gram(s) IV Push once  dextrose 50% Injectable 12.5 Gram(s) IV Push once  dextrose 50% Injectable 25 Gram(s) IV Push once  dextrose Oral Gel 15 Gram(s) Oral once PRN  febuxostat 40 milliGRAM(s) Oral daily  folic acid 1 milliGRAM(s) Oral daily  glucagon  Injectable 1 milliGRAM(s) IntraMuscular once  insulin glargine Injectable (LANTUS) 20 Unit(s) SubCutaneous every morning  insulin lispro (ADMELOG) corrective regimen sliding scale   SubCutaneous at bedtime  insulin lispro (ADMELOG) corrective regimen sliding scale   SubCutaneous three times a day before meals  insulin lispro Injectable (ADMELOG) 4 Unit(s) SubCutaneous before dinner  insulin lispro Injectable (ADMELOG) 16 Unit(s) SubCutaneous before lunch  insulin lispro Injectable (ADMELOG) 28 Unit(s) SubCutaneous before breakfast  melatonin 3 milliGRAM(s) Oral at bedtime PRN  metoprolol succinate  milliGRAM(s) Oral daily  nystatin    Suspension 042987 Unit(s) Oral three times a day  ondansetron Injectable 4 milliGRAM(s) IV Push every 8 hours PRN  pantoprazole    Tablet 40 milliGRAM(s) Oral before breakfast  predniSONE   Tablet 30 milliGRAM(s) Oral daily  senna 2 Tablet(s) Oral at bedtime  sodium zirconium cyclosilicate 10 Gram(s) Oral <User Schedule>  tacrolimus 2 milliGRAM(s) Oral two times a day  trimethoprim   80 mG/sulfamethoxazole 400 mG 1 Tablet(s) Oral daily  valGANciclovir 450 milliGRAM(s) Oral every 48 hours        Vitals:  Vital Signs Last 24 Hours  T(C): 36.6 (23 @ 12:32), Max: 36.6 (23 @ 12:32)  HR: 96 (23 @ 12:32) (91 - 101)  BP: 107/72 (23 @ 12:32) (107/72 - 131/84)  RR: 18 (23 @ 12:32) (18 - 18)  SpO2: 99% (23 @ 12:32) (98% - 99%)    Weight in k.8 ( @ 06:44)    I&O's Summary    31 May 2023 07:  -  2023 07:00  --------------------------------------------------------  IN: 740 mL / OUT: 630 mL / NET: 110 mL    2023 07:  -  2023 14:37  --------------------------------------------------------  IN: 365 mL / OUT: 350 mL / NET: 15 mL        Physical exam  General: No distress. Comfortable.  Neck: JVP not elevated.   Chest: Clear to auscultation bilaterally  CV: Normal S1 and S2. No murmurs, rub, or gallops. Radial pulses normal.  Abdomen: Soft, non-distended, non-tender  Extremities: warm and well perfused, 1+ edema   Neurology: Alert and oriented times three.    Labs:        140  |  106  |  44<H>  ----------------------------<  223<H>  5.9<H>   |  22  |  2.02<H>    Ca    8.6      2023 08:57

## 2023-06-01 NOTE — PHYSICAL THERAPY INITIAL EVALUATION ADULT - FUNCTIONAL LIMITATIONS, PT EVAL
[facility] [address] [address]  EMG Report      Test Date:  2023    Patient: Jewels Queen : 1970 Physician: ZACH Germain   Sex: Female Height: 5' 4\" Ref Phys: Joellen Castillo DO   ID#:  Weight: 165 lbs. Technician:      Patient Complaints:  Patient is a 51 y/o lady, right handed who has been complaining of numbness and tingling in the arm, not in the hand or fingers, she does complain of also weakness. This has been present since last December.  PMH: No other medical illnesses.  Medications: None.  Allergies: Penicillin.  Habits: She drinks occasionally. and she does not smoke.  Social Hx: Teacher.  Fam Hx: No DM, strokes and MI in the father.    Exam:  The patient has normal mental status, she has normal strength, tone and bulk and there is no numbness detected at the physical examination, reflexes are decreased, negative Tinel and Phalen and also negative Pronator Teres.     Impression: (Abnormal).  1- L median motor nerve shows axonal derangement and the left radial motor nerve showed axonal and demyelinating features.  2- EMG was within normal limits.  Further clinical correlation is recommended.      NCV Findings:  1-Evaluation of the Left median motor nerve showed reduced amplitude (1.6 mV).  The Left radial motor nerve showed prolonged distal onset latency (2.7 ms) and reduced amplitude (1.4 mV).      All remaining nerves (as indicated in the following tables) were within normal limits.      All F Wave latencies were within normal limits.      EMG Findings:  1. Needle evaluation of the Left biceps, the Left triceps, the Left deltoid, the Left abductor pollicis brevis, the Left abductor digiti minimi, the Left first dorsal interosseous, the Left flexor carpi radialis, the Left flexor carpi ulnaris, the Left pronator teres, and the Left brachioradialis muscles showed normal insertional activity, Nml Fibs, Nml Psw, normal motor unit amplitude, normal 
motor unit duration, no polyphasic potentials, normal recruitment, and Nml Int Pat.  2. The Left low cervical paraspinal, the Left mid cervical paraspinal, and the Left upper cervical paraspinal muscles showed normal insertional activity, Nml Fibs, and Nml Psw.          Thank you very much for the referral to the electrodiagnostic laboratory at [facility].    ___________________________  ANIA Rosa Neurologist.        Nerve Conduction Studies  Anti Sensory Summary Table     Stim Site NR Onset (ms) Peak (ms) Norm Peak (ms) O-P Amp (µV) Norm O-P Amp Site1 Site2 Delta-0 (ms) Dist (cm) Bola (m/s) Norm Bola (m/s)   Left Median Anti Sensory (2nd Digit)   Wrist    2.4 3.2 <3.6 36.3 >10 Wrist 2nd Digit 2.4 14.0 58    Left Radial Anti Sensory (Base 1st Digit)   Wrist    1.4 1.9 <3.1 49.2 >7 Wrist Base 1st Digit 1.4 10.0 71    Left Ulnar Anti Sensory (5th Digit)   Wrist    2.0 2.7 <3.7 34.3 >15.0 Wrist 5th Digit 2.0 14.0 70      Motor Summary Table     Stim Site NR Onset (ms) Norm Onset (ms) O-P Amp (mV) Norm O-P Amp Site1 Site2 Delta-0 (ms) Dist (cm) Bola (m/s) Norm Bola (m/s)   Left Median Motor (Abd Poll Brev)   Wrist    3.8 <4.2 1.6 >5 Elbow Wrist 3.9 21.4 55 >50   Elbow    7.7  1.2          Left Radial Motor (Ext Ind Prop)   Forearm 8cm    2.7 <2.5 1.4 >1.7 Elbow Forearm 8cm 1.9 12.0 63 >60   Elbow    4.6  0.2          Left Ulnar Motor (Abd Dig Minimi)   Wrist    2.0 <4.2 9.5 >7 B Elbow Wrist 3.2 20.3 63 >53   B Elbow    5.2  7.8  A Elbow B Elbow 1.4 10.5 75 >53   A Elbow    6.6  7.3            F Wave Studies     NR F-Lat (ms) Lat Norm (ms) L-R F-Lat (ms) L-R Lat Norm   Left Median (Mrkrs) (Abd Poll Brev)      25.33 <33  <2.2   Left Ulnar (Mrkrs) (Abd Dig Min)      23.33 <36  <2.5     EMG     Side Muscle Nerve Root Ins Act Fibs Psw Amp Dur Poly Recrt Int Pat Comment   Left Biceps Musculocut C5-6 Nml Nml Nml Nml Nml 0 Nml Nml    Left Triceps Radial C6-7-8 Nml Nml Nml Nml Nml 0 Nml Nml    Left 
Deltoid Axillary C5-6 Nml Nml Nml Nml Nml 0 Nml Nml    Left Abd Poll Brev Median C8-T1 Nml Nml Nml Nml Nml 0 Nml Nml    Left ABD Dig Min Ulnar C8-T1 Nml Nml Nml Nml Nml 0 Nml Nml    Left 1stDorInt Ulnar C8-T1 Nml Nml Nml Nml Nml 0 Nml Nml    Left FlexCarRad Median C6-7 Nml Nml Nml Nml Nml 0 Nml Nml    Left FlexCarpiUln Ulnar C8,T1 Nml Nml Nml Nml Nml 0 Nml Nml    Left PronatorTeres Median C6-7 Nml Nml Nml Nml Nml 0 Nml Nml    Left BrachioRad Radial C5-6 Nml Nml Nml Nml Nml 0 Nml Nml    Left Cervical Parasp Low Rami C7-8 Nml Nml Nml         Left Cervical Parasp Mid Rami C4-6 Nml Nml Nml         Left Cervical Parasp Up Rami C1-3 Nml Nml Nml             Nerve Conduction Studies  Anti Sensory Left/Right Comparison     Stim Site L Lat (ms) R Lat (ms) L-R Lat (ms) L Amp (µV) R Amp (µV) L-R Amp (%) Site1 Site2 L Bola (m/s) R Bola (m/s) L-R Bola (m/s)   Median Anti Sensory (2nd Digit)   Wrist 2.4   36.3   Wrist 2nd Digit 58     Radial Anti Sensory (Base 1st Digit)   Wrist 1.4   49.2   Wrist Base 1st Digit 71     Ulnar Anti Sensory (5th Digit)   Wrist 2.0   34.3   Wrist 5th Digit 70       Motor Left/Right Comparison     Stim Site L Lat (ms) R Lat (ms) L-R Lat (ms) L Amp (mV) R Amp (mV) L-R Amp (%) Site1 Site2 L Bola (m/s) R Bola (m/s) L-R Bola (m/s)   Median Motor (Abd Poll Brev)   Wrist 3.8   1.6   Elbow Wrist 55     Elbow 7.7   1.2          Radial Motor (Ext Ind Prop)   Forearm 8cm 2.7   1.4   Elbow Forearm 8cm 63     Elbow 4.6   0.2          Ulnar Motor (Abd Dig Minimi)   Wrist 2.0   9.5   B Elbow Wrist 63     B Elbow 5.2   7.8   A Elbow B Elbow 75     A Elbow 6.6   7.3                Waveforms:                           
community/leisure/self-care/home management
self-care

## 2023-06-01 NOTE — DISCHARGE NOTE NURSING/CASE MANAGEMENT/SOCIAL WORK - NSDCPEFALRISK_GEN_ALL_CORE
For information on Fall & Injury Prevention, visit: [email or web address] OR  [email or web address] OR  https://www.cdc.gov/steadi/patient.html

## 2023-06-01 NOTE — PROGRESS NOTE ADULT - ASSESSMENT
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 ER on 3/4 with new onset of chest tightness, found to be in Aflutter/fib.  Course c/b by URI w/ human metapneumovirus c/f PNA. He was readmitted from  clinic due to leg edema and two mechanical falls. Since being admitted he was aggressively diuresis and his volume status is much improved. Most recent ECHO revealed reduced EF of 50% (prior ECHO 72%) however his Allosure is normal. He is nearing readiness for discharge, currently monitoring his hyperkalemia. Hopeful for discharge home within the next day or two.     Cardiac studies:  TTE: 5/19: LVIDd 4.1, EF 51%" Compared to the transthoracic echocardiogram performed on 1/5/2023 There is a decrease in LVEF. On the previous study, the LV was hyperdynamic. There is more turbulence seen in the RV compared to the previous. RV size is mildly dilated but function appears similar.  TTE 3/3/23: LVIDd 3.6, EF 50-55%, concentric remodeling

## 2023-06-01 NOTE — PROGRESS NOTE ADULT - PROBLEM SELECTOR PLAN 1
- readmitted volume overloaded with reduced EF, s/p IV diuretics  - Allosure return (was less than 0.12), no bx needed at this time  -monitor creatinine and electrolytes  -strict I and O  -daily standing weights.  - plan to give Bumex 1mg PO BID today and tomorrow will reduce Bumex 1 mg PO daily (on lasix 40 BID as outpatient)

## 2023-06-01 NOTE — PROGRESS NOTE ADULT - PROBLEM SELECTOR PLAN 3
Heme eval appreciated  Continue Prednisone 30 mg PO daily, no role for rituximab   Daily CBC - opthalmology consulted

## 2023-06-01 NOTE — DISCHARGE NOTE NURSING/CASE MANAGEMENT/SOCIAL WORK - PATIENT PORTAL LINK FT
You can access the FollowMyHealth Patient Portal offered by [facility] by registering at the following website: [email or web address]. By joining [employer]’s FollowMyHealth portal, you will also be able to view your health information using other applications (apps) compatible with our system.

## 2023-06-01 NOTE — PROGRESS NOTE ADULT - PROBLEM SELECTOR PLAN 5
fs controlled  fs achs  Endocrine following  c/w Humalog to 34-16-5 and Lantus to 16 U QHS with sliding scale  c/w carb controlled diet/ dash Continue Apixaban, metoprolol  Currently in SR

## 2023-06-01 NOTE — PROGRESS NOTE ADULT - SUBJECTIVE AND OBJECTIVE BOX
no complaints.    GENERAL: No fevers, no chills.  EYES: No blurry vision,  No photophobia  ENT: No sore throat.  No dysphagia  Cardiovascular: No chest pain, palpitations, orthopnea  Pulmonary: No cough, no wheezing. No shortness of breath  Gastrointestinal: No abdominal pain, no diarrhea, no constipation.    Musculoskeletal: No weakness.  No myalgias.  Dermatology:  No rashes.  Neuro: No Headache.  No vertigo.  No dizziness.  Psych: No anxiety, no depression.  Denies suicidal thoughts.    MEDICATIONS  (STANDING):  apixaban 5 milliGRAM(s) Oral every 12 hours  aspirin  chewable 81 milliGRAM(s) Oral daily  atorvastatin 10 milliGRAM(s) Oral at bedtime  buMETAnide 1 milliGRAM(s) Oral once  buMETAnide 1 milliGRAM(s) Oral daily  dextrose 5%. 1000 milliLiter(s) (100 mL/Hr) IV Continuous <Continuous>  dextrose 5%. 1000 milliLiter(s) (50 mL/Hr) IV Continuous <Continuous>  dextrose 50% Injectable 25 Gram(s) IV Push once  dextrose 50% Injectable 12.5 Gram(s) IV Push once  dextrose 50% Injectable 25 Gram(s) IV Push once  febuxostat 40 milliGRAM(s) Oral daily  folic acid 1 milliGRAM(s) Oral daily  glucagon  Injectable 1 milliGRAM(s) IntraMuscular once  insulin glargine Injectable (LANTUS) 20 Unit(s) SubCutaneous every morning  insulin lispro (ADMELOG) corrective regimen sliding scale   SubCutaneous three times a day before meals  insulin lispro (ADMELOG) corrective regimen sliding scale   SubCutaneous at bedtime  insulin lispro Injectable (ADMELOG) 16 Unit(s) SubCutaneous before lunch  insulin lispro Injectable (ADMELOG) 28 Unit(s) SubCutaneous before breakfast  insulin lispro Injectable (ADMELOG) 4 Unit(s) SubCutaneous before dinner  metoprolol succinate  milliGRAM(s) Oral daily  nystatin    Suspension 657412 Unit(s) Oral three times a day  pantoprazole    Tablet 40 milliGRAM(s) Oral before breakfast  predniSONE   Tablet 30 milliGRAM(s) Oral daily  senna 2 Tablet(s) Oral at bedtime  tacrolimus 2 milliGRAM(s) Oral two times a day  trimethoprim   80 mG/sulfamethoxazole 400 mG 1 Tablet(s) Oral daily  valGANciclovir 450 milliGRAM(s) Oral every 48 hours    MEDICATIONS  (PRN):  acetaminophen     Tablet .. 650 milliGRAM(s) Oral every 6 hours PRN Temp greater or equal to 38C (100.4F), Mild Pain (1 - 3)  aluminum hydroxide/magnesium hydroxide/simethicone Suspension 30 milliLiter(s) Oral every 4 hours PRN Dyspepsia  dextrose Oral Gel 15 Gram(s) Oral once PRN Blood Glucose LESS THAN 70 milliGRAM(s)/deciliter  melatonin 3 milliGRAM(s) Oral at bedtime PRN Insomnia  ondansetron Injectable 4 milliGRAM(s) IV Push every 8 hours PRN Nausea and/or Vomiting    Vital Signs Last 24 Hrs  T(C): 36.4 (01 Jun 2023 04:04), Max: 36.4 (01 Jun 2023 04:04)  T(F): 97.6 (01 Jun 2023 04:04), Max: 97.6 (01 Jun 2023 04:04)  HR: 91 (01 Jun 2023 04:04) (91 - 101)  BP: 114/83 (01 Jun 2023 04:04) (114/83 - 131/84)  BP(mean): --  RR: 18 (01 Jun 2023 04:04) (18 - 18)  SpO2: 98% (01 Jun 2023 04:04) (98% - 98%)    Parameters below as of 01 Jun 2023 04:04  Patient On (Oxygen Delivery Method): room air    PHYSICAL EXAM  GENERAL: NAD  HEAD:  Atraumatic, normocephalic  EYES: EOMI, PERRLA, conjunctiva and sclera clear  CHEST/LUNG: Bibasilar rales; no wheezes  HEART: +S1+S2, regular rate and rhythm, 3/6 BRENT  ABDOMEN: Soft, nontender, nondistended; bowel sounds present  EXTREMITIES:  2+ peripheral pulses; no clubbing, cyanosis; 1+ pitting edema b/l LE  PSYCH: AAOx3    .  LABS:     06-01    140  |  106  |  44<H>  ----------------------------<  223<H>  5.9<H>   |  22  |  2.02<H>    Ca    8.6      01 Jun 2023 08:57                RADIOLOGY, EKG & ADDITIONAL TESTS: Reviewed.  right eye clear very red, not painful, no changes in vision.     GENERAL: No fevers, no chills.  EYES: No blurry vision,  No photophobia  ENT: No sore throat.  No dysphagia  Cardiovascular: No chest pain, palpitations, orthopnea  Pulmonary: No cough, no wheezing. No shortness of breath  Gastrointestinal: No abdominal pain, no diarrhea, no constipation.    Musculoskeletal: No weakness.  No myalgias.  Dermatology:  No rashes.  Neuro: No Headache.  No vertigo.  No dizziness.  Psych: No anxiety, no depression.  Denies suicidal thoughts.    MEDICATIONS  (STANDING):  apixaban 5 milliGRAM(s) Oral every 12 hours  aspirin  chewable 81 milliGRAM(s) Oral daily  atorvastatin 10 milliGRAM(s) Oral at bedtime  buMETAnide 1 milliGRAM(s) Oral once  buMETAnide 1 milliGRAM(s) Oral daily  dextrose 5%. 1000 milliLiter(s) (100 mL/Hr) IV Continuous <Continuous>  dextrose 5%. 1000 milliLiter(s) (50 mL/Hr) IV Continuous <Continuous>  dextrose 50% Injectable 25 Gram(s) IV Push once  dextrose 50% Injectable 12.5 Gram(s) IV Push once  dextrose 50% Injectable 25 Gram(s) IV Push once  febuxostat 40 milliGRAM(s) Oral daily  folic acid 1 milliGRAM(s) Oral daily  glucagon  Injectable 1 milliGRAM(s) IntraMuscular once  insulin glargine Injectable (LANTUS) 20 Unit(s) SubCutaneous every morning  insulin lispro (ADMELOG) corrective regimen sliding scale   SubCutaneous three times a day before meals  insulin lispro (ADMELOG) corrective regimen sliding scale   SubCutaneous at bedtime  insulin lispro Injectable (ADMELOG) 16 Unit(s) SubCutaneous before lunch  insulin lispro Injectable (ADMELOG) 28 Unit(s) SubCutaneous before breakfast  insulin lispro Injectable (ADMELOG) 4 Unit(s) SubCutaneous before dinner  metoprolol succinate  milliGRAM(s) Oral daily  nystatin    Suspension 180591 Unit(s) Oral three times a day  pantoprazole    Tablet 40 milliGRAM(s) Oral before breakfast  predniSONE   Tablet 30 milliGRAM(s) Oral daily  senna 2 Tablet(s) Oral at bedtime  tacrolimus 2 milliGRAM(s) Oral two times a day  trimethoprim   80 mG/sulfamethoxazole 400 mG 1 Tablet(s) Oral daily  valGANciclovir 450 milliGRAM(s) Oral every 48 hours    MEDICATIONS  (PRN):  acetaminophen     Tablet .. 650 milliGRAM(s) Oral every 6 hours PRN Temp greater or equal to 38C (100.4F), Mild Pain (1 - 3)  aluminum hydroxide/magnesium hydroxide/simethicone Suspension 30 milliLiter(s) Oral every 4 hours PRN Dyspepsia  dextrose Oral Gel 15 Gram(s) Oral once PRN Blood Glucose LESS THAN 70 milliGRAM(s)/deciliter  melatonin 3 milliGRAM(s) Oral at bedtime PRN Insomnia  ondansetron Injectable 4 milliGRAM(s) IV Push every 8 hours PRN Nausea and/or Vomiting    Vital Signs Last 24 Hrs  T(C): 36.4 (01 Jun 2023 04:04), Max: 36.4 (01 Jun 2023 04:04)  T(F): 97.6 (01 Jun 2023 04:04), Max: 97.6 (01 Jun 2023 04:04)  HR: 91 (01 Jun 2023 04:04) (91 - 101)  BP: 114/83 (01 Jun 2023 04:04) (114/83 - 131/84)  BP(mean): --  RR: 18 (01 Jun 2023 04:04) (18 - 18)  SpO2: 98% (01 Jun 2023 04:04) (98% - 98%)    Parameters below as of 01 Jun 2023 04:04  Patient On (Oxygen Delivery Method): room air    PHYSICAL EXAM  GENERAL: NAD  HEAD:  Atraumatic, normocephalic  EYES: + right conjunctival hemorrhage  CHEST/LUNG: Bibasilar rales; no wheezes  HEART: +S1+S2, regular rate and rhythm, 3/6 BRENT  ABDOMEN: Soft, nontender, nondistended; bowel sounds present  EXTREMITIES:  2+ peripheral pulses; no clubbing, cyanosis; 1+ pitting edema b/l LE  PSYCH: AAOx3    .  LABS:     06-01    140  |  106  |  44<H>  ----------------------------<  223<H>  5.9<H>   |  22  |  2.02<H>    Ca    8.6      01 Jun 2023 08:57                RADIOLOGY, EKG & ADDITIONAL TESTS: Reviewed.

## 2023-06-01 NOTE — PROGRESS NOTE ADULT - PROBLEM SELECTOR PLAN 2
BP goal < 130/80, will defer to primary team      discussed w/pt and team  Can be reached via TEAMS/pager: [phone]  office:  [phone] (M-F 9a-5pm)               [phone] (nights/weekends)   Can access Amion coverage via sunrise/tools BP goal < 130/80, will defer to primary team        discussed with patient and RN   Contact via [employer] Teams during business hours  To reach covering provider access AMION via sunrise tools  For Urgent matters/after-hours/weekends/holidays please page endocrine fellow on call   For nonurgent matters please email [email or web address]    Please note that this patient may be followed by different provider tomorrow.  Notify endocrine 24 hours prior to discharge for final recommendations

## 2023-06-01 NOTE — PROGRESS NOTE ADULT - PROBLEM SELECTOR PLAN 4
-5 years out, transplant on 2/23/2018, from HM2 VAD due to pump thrombosis  -stable coronary cameral fistula  -see immunosuppression below  -family meeting held 5/31  - will need to arrange for home PT

## 2023-06-01 NOTE — PROGRESS NOTE ADULT - PROBLEM SELECTOR PLAN 1
-test BG AC/HS  -Change Lantus to 20 units QAM. First dose in AM on 6/1. Ordered NPH 8 units x 1 tonight to bridge to AM lantus  -Adjust Admelog 28-16-4 w/meals for now based on BG pattern  -c/w Admelog moderate correction scale AC and mod HS scale  -cons carb diet  -Pt on Prednisone 30mg QAM>notify endocrine team when this is further tapered.   Discharge:   Will move Lantus to AM upon discharge as pt has more support from Aide at that time.   Consider 70/30 in AM to reduce insulin burden for patient. Discussed w/pt needs to eat 3 meals/day, pt endorsed aide will ensure he has meal for lunch prior to leaving for the day.   -Can follow at [facility] practice. [address] Phone   Aug 15th 10:45am w/Dr Clemens -test BG AC/HS  -C/w  Lantus to 20 units QAM. First dose in AM on 6/1  -Adjust Admelog 30-14-4 w/meals for now based on BG pattern  -c/w Admelog moderate correction scale AC and mod HS scale  -cons carb diet  -Pt on Prednisone 30mg QAM>notify endocrine team when this is further tapered.     if pt is discharged today:  please ensure pt has adequate insulin prescriptions and refills and DM supplies   aide/family to assist with supervising insulin injections at home   Lantus solostar pen 20 units sq qam  Humalog pens 30-14-4 at home   -Can follow at [facility] practice. [address] Phone   Aug 15th 10:45am w/Dr Clemens

## 2023-06-01 NOTE — CONSULT NOTE ADULT - REASON FOR ADMISSION
near syncope, worsening HF sx

## 2023-06-01 NOTE — CONSULT NOTE ADULT - SUBJECTIVE AND OBJECTIVE BOX
[facility] DEPARTMENT OF OPHTHALMOLOGY - INITIAL ADULT CONSULT  -----------------------------------------------------------------------------------------------------------  Stephani Jones MD PGY-3  -----------------------------------------------------------------------------------------------------------    HPI:  73y M PMH  HTN, HLD, Nonischemic cardiomyopathy, chronic systolic heart failure s/p HM2 LVAD (6/2017), s/p heart transplant from Hep. C donor (treated) 2/23/18 (post op course complicated by graft dysfunction treated by plasmapheresis, IVIG, and rituximab), being sent from HF clinic for worsening sx. Has been experiencing worsening SOB, increased LE edema, weakness and near syncopal episode. Pt has experienced wt gain and have been requiring increasing doses of diuretics at home. He had near syncopal episode while at the HF clinic today in presence of Raymond Beebe no fall, injury, head trauma, LOC. Given worsening sx, was sent for further management and Iv diuretics     Denies CP, palpitation, N/V/D, fever, cough, chills, dizziness, abm pain        (23 May 2023 20:36)    Interval History: Ophthalmology consulted for AARON OD. Pt denies rubbing his eye, any trauma, heavy lifting/straining or coughing. On eliquis and ASA. No changes in vision.     PAST MEDICAL & SURGICAL HISTORY:  HTN      SVT (Supraventricular Tachycardia)      Non-Ischemic Cardiomyopathy  now s/p transplant 2018      GIB (gastrointestinal bleeding)      Hepatitis C virus      DVT of upper extremity (deep vein thrombosis)      Former smoker      HLD (hyperlipidemia)      Knee pain, right      H/O autoimmune hemolytic anemia      H/O hemolytic anemia      Status post left hip replacement      H/O heart transplant  2/2018        Past Ocular History: denies surg/laser  Ophthalmic Medications: none  FAMILY HISTORY:   no glc/amd  Social History: no etoh/tobacco    MEDICATIONS  (STANDING):  apixaban 5 milliGRAM(s) Oral every 12 hours  artificial tears (preservative free) Ophthalmic Solution 1 Drop(s) Both EYES four times a day  aspirin  chewable 81 milliGRAM(s) Oral daily  atorvastatin 10 milliGRAM(s) Oral at bedtime  buMETAnide 1 milliGRAM(s) Oral daily  dextrose 5%. 1000 milliLiter(s) (50 mL/Hr) IV Continuous <Continuous>  dextrose 5%. 1000 milliLiter(s) (100 mL/Hr) IV Continuous <Continuous>  dextrose 50% Injectable 25 Gram(s) IV Push once  dextrose 50% Injectable 12.5 Gram(s) IV Push once  dextrose 50% Injectable 25 Gram(s) IV Push once  febuxostat 40 milliGRAM(s) Oral daily  folic acid 1 milliGRAM(s) Oral daily  glucagon  Injectable 1 milliGRAM(s) IntraMuscular once  insulin glargine Injectable (LANTUS) 20 Unit(s) SubCutaneous every morning  insulin lispro (ADMELOG) corrective regimen sliding scale   SubCutaneous at bedtime  insulin lispro (ADMELOG) corrective regimen sliding scale   SubCutaneous three times a day before meals  insulin lispro Injectable (ADMELOG) 4 Unit(s) SubCutaneous before dinner  metoprolol succinate  milliGRAM(s) Oral daily  nystatin    Suspension [id number] Unit(s) Oral three times a day  pantoprazole    Tablet 40 milliGRAM(s) Oral before breakfast  predniSONE   Tablet 30 milliGRAM(s) Oral daily  senna 2 Tablet(s) Oral at bedtime  sodium zirconium cyclosilicate 10 Gram(s) Oral <User Schedule>  tacrolimus 2 milliGRAM(s) Oral two times a day  trimethoprim   80 mG/sulfamethoxazole 400 mG 1 Tablet(s) Oral daily  valGANciclovir 450 milliGRAM(s) Oral every 48 hours    MEDICATIONS  (PRN):  acetaminophen     Tablet .. 650 milliGRAM(s) Oral every 6 hours PRN Temp greater or equal to 38C (100.4F), Mild Pain (1 - 3)  aluminum hydroxide/magnesium hydroxide/simethicone Suspension 30 milliLiter(s) Oral every 4 hours PRN Dyspepsia  dextrose Oral Gel 15 Gram(s) Oral once PRN Blood Glucose LESS THAN 70 milliGRAM(s)/deciliter  melatonin 3 milliGRAM(s) Oral at bedtime PRN Insomnia  ondansetron Injectable 4 milliGRAM(s) IV Push every 8 hours PRN Nausea and/or Vomiting    Allergies & Intolerances:   No Known Allergies    Review of Systems:  Constitutional: No fever, chills  Eyes: No blurry vision, flashes, floaters, FBS, erythema, discharge, double vision, OU  Neuro: No tremors  Cardiovascular: No chest pain, palpitations  Respiratory: No SOB, no cough  GI: No nausea, vomiting, abdominal pain  : No dysuria  Skin: no rash  Psych: no depression  Endocrine: no polyuria, polydipsia  Heme/lymph: no swelling    VITALS: T(C): 36.6 (06-01-23 @ 12:32)  T(F): 97.9 (06-01-23 @ 12:32), Max: 97.9 (06-01-23 @ 12:32)  HR: 96 (06-01-23 @ 12:32) (91 - 101)  BP: 107/72 (06-01-23 @ 12:32) (107/72 - 131/84)  RR:  (18 - 18)  SpO2:  (98% - 99%)  Wt(kg): --  General: AAO x 3, appropriate mood and affect    Ophthalmology Exam:  Visual acuity (sc): 20/25 OD, 20/25 OS  Pupils: PERRL OU, no APD  Ttono: 18, 19  Extraocular movements (EOMs): Full OU, no pain, no diplopia  Confrontational Visual Field (CVF): Full OU  Color Plates: 12/12 OU    Pen Light Exam (PLE)  External: Flat OU  Lids/Lashes/Lacrimal Ducts: Flat OU    Sclera/Conjunctiva: temporal AARON, temporal pinguecula OD. W+Q OS  Cornea: Cl OU  Anterior Chamber: D+F OU    Iris: Flat OU  Lens: NS OU    Fundus Exam: dilated with 1% tropicamide and 2.5% phenylephrine  Approval obtained from primary team for dilation  Patient aware that pupils can remained dilated for at least 4-6 hours  Exam performed with 20D lens    Vitreous: wnl OU  Disc, cup/disc: sharp and pink, 0.45 OU PPA OS  Macula: wnl OU  Vessels: wnl OU  Periphery: wnl OU

## 2023-06-01 NOTE — DISCHARGE NOTE NURSING/CASE MANAGEMENT/SOCIAL WORK - NSDCVIVACCINE_GEN_ALL_CORE_FT
Hep A, adult; 07-Feb-2018 09:02; Kathleen Reyes (RN); [employer]; 7439F; IntraMuscular; Deltoid Right.; 1 milliLiter(s); VIS (VIS Published: 20-Jul-2017, VIS Presented: 07-Feb-2018);   Hep B, adult; 07-Feb-2018 09:09; Kathleen Reyes (RN); [employer]; B39CM; IntraMuscular; Deltoid Left.; 1 milliLiter(s); VIS (VIS Published: 20-Jul-2016, VIS Presented: 07-Feb-2018);   Hep B, adult; 16-Feb-2018 19:00; Brooklynn Coles (DIXON); [employer]; B39CM; IntraMuscular; Deltoid Right.; 1 milliLiter(s); VIS (VIS Published: 20-Jul-2016, VIS Presented: 16-Feb-2018);   influenza, injectable, quadrivalent, preservative free; 02-Oct-2020 12:07; Enriqueta Hassan ([place]); [employer]; [id number] (Exp. Date: 30-Jun-2021); IntraMuscular; Deltoid Right.; 0.5 milliLiter(s); VIS (VIS Published: 15-Aug-2019, VIS Presented: 02-Oct-2020);

## 2023-06-02 ENCOUNTER — APPOINTMENT (OUTPATIENT)
Dept: ENDOCRINOLOGY | Facility: CLINIC | Age: 73
End: 2023-06-02

## 2023-06-02 VITALS — WEIGHT: 172.62 LBS

## 2023-06-02 DIAGNOSIS — H11.31 CONJUNCTIVAL HEMORRHAGE, RIGHT EYE: ICD-10-CM

## 2023-06-02 LAB
ANION GAP SERPL CALC-SCNC: 13 MMOL/L — SIGNIFICANT CHANGE UP (ref 5–17)
BUN SERPL-MCNC: 42 MG/DL — HIGH (ref 7–23)
CALCIUM SERPL-MCNC: 8.5 MG/DL — SIGNIFICANT CHANGE UP (ref 8.4–10.5)
CHLORIDE SERPL-SCNC: 104 MMOL/L — SIGNIFICANT CHANGE UP (ref 96–108)
CO2 SERPL-SCNC: 25 MMOL/L — SIGNIFICANT CHANGE UP (ref 22–31)
CREAT SERPL-MCNC: 2.19 MG/DL — HIGH (ref 0.5–1.3)
EGFR: 31 ML/MIN/1.73M2 — LOW
GLUCOSE BLDC GLUCOMTR-MCNC: 131 MG/DL — HIGH (ref 70–99)
GLUCOSE BLDC GLUCOMTR-MCNC: 212 MG/DL — HIGH (ref 70–99)
GLUCOSE SERPL-MCNC: 203 MG/DL — HIGH (ref 70–99)
POTASSIUM SERPL-MCNC: 5 MMOL/L — SIGNIFICANT CHANGE UP (ref 3.5–5.3)
POTASSIUM SERPL-SCNC: 5 MMOL/L — SIGNIFICANT CHANGE UP (ref 3.5–5.3)
SODIUM SERPL-SCNC: 142 MMOL/L — SIGNIFICANT CHANGE UP (ref 135–145)
TACROLIMUS SERPL-MCNC: 6.7 NG/ML — SIGNIFICANT CHANGE UP
TACROLIMUS SERPL-MCNC: 8.3 NG/ML — SIGNIFICANT CHANGE UP

## 2023-06-02 PROCEDURE — 36415 COLL VENOUS BLD VENIPUNCTURE: CPT

## 2023-06-02 PROCEDURE — 87040 BLOOD CULTURE FOR BACTERIA: CPT

## 2023-06-02 PROCEDURE — 84100 ASSAY OF PHOSPHORUS: CPT

## 2023-06-02 PROCEDURE — 85025 COMPLETE CBC W/AUTO DIFF WBC: CPT

## 2023-06-02 PROCEDURE — 83036 HEMOGLOBIN GLYCOSYLATED A1C: CPT

## 2023-06-02 PROCEDURE — 82010 KETONE BODYS QUAN: CPT

## 2023-06-02 PROCEDURE — 84132 ASSAY OF SERUM POTASSIUM: CPT

## 2023-06-02 PROCEDURE — 76770 US EXAM ABDO BACK WALL COMP: CPT

## 2023-06-02 PROCEDURE — 71045 X-RAY EXAM CHEST 1 VIEW: CPT

## 2023-06-02 PROCEDURE — 99285 EMERGENCY DEPT VISIT HI MDM: CPT | Mod: 25

## 2023-06-02 PROCEDURE — 85018 HEMOGLOBIN: CPT

## 2023-06-02 PROCEDURE — 85045 AUTOMATED RETICULOCYTE COUNT: CPT

## 2023-06-02 PROCEDURE — 80053 COMPREHEN METABOLIC PANEL: CPT

## 2023-06-02 PROCEDURE — 82565 ASSAY OF CREATININE: CPT

## 2023-06-02 PROCEDURE — 85027 COMPLETE CBC AUTOMATED: CPT

## 2023-06-02 PROCEDURE — 82803 BLOOD GASES ANY COMBINATION: CPT

## 2023-06-02 PROCEDURE — 99232 SBSQ HOSP IP/OBS MODERATE 35: CPT

## 2023-06-02 PROCEDURE — 82435 ASSAY OF BLOOD CHLORIDE: CPT

## 2023-06-02 PROCEDURE — 80197 ASSAY OF TACROLIMUS: CPT

## 2023-06-02 PROCEDURE — 82962 GLUCOSE BLOOD TEST: CPT

## 2023-06-02 PROCEDURE — 97116 GAIT TRAINING THERAPY: CPT

## 2023-06-02 PROCEDURE — 85610 PROTHROMBIN TIME: CPT

## 2023-06-02 PROCEDURE — 83880 ASSAY OF NATRIURETIC PEPTIDE: CPT

## 2023-06-02 PROCEDURE — 85014 HEMATOCRIT: CPT

## 2023-06-02 PROCEDURE — 83735 ASSAY OF MAGNESIUM: CPT

## 2023-06-02 PROCEDURE — 96374 THER/PROPH/DIAG INJ IV PUSH: CPT

## 2023-06-02 PROCEDURE — 97530 THERAPEUTIC ACTIVITIES: CPT

## 2023-06-02 PROCEDURE — 82330 ASSAY OF CALCIUM: CPT

## 2023-06-02 PROCEDURE — 83615 LACTATE (LD) (LDH) ENZYME: CPT

## 2023-06-02 PROCEDURE — 97161 PT EVAL LOW COMPLEX 20 MIN: CPT

## 2023-06-02 PROCEDURE — 82947 ASSAY GLUCOSE BLOOD QUANT: CPT

## 2023-06-02 PROCEDURE — 84295 ASSAY OF SERUM SODIUM: CPT

## 2023-06-02 PROCEDURE — 84484 ASSAY OF TROPONIN QUANT: CPT

## 2023-06-02 PROCEDURE — 70450 CT HEAD/BRAIN W/O DYE: CPT | Mod: MA

## 2023-06-02 PROCEDURE — 80048 BASIC METABOLIC PNL TOTAL CA: CPT

## 2023-06-02 PROCEDURE — 83605 ASSAY OF LACTIC ACID: CPT

## 2023-06-02 PROCEDURE — 97110 THERAPEUTIC EXERCISES: CPT

## 2023-06-02 RX ORDER — BUMETANIDE 0.25 MG/ML
1 INJECTION INTRAMUSCULAR; INTRAVENOUS
Qty: 30 | Refills: 0
Start: 2023-06-02 | End: 2023-07-01

## 2023-06-02 RX ORDER — SENNA PLUS 8.6 MG/1
2 TABLET ORAL
Qty: 60 | Refills: 0
Start: 2023-06-02 | End: 2023-07-01

## 2023-06-02 RX ORDER — TACROLIMUS 5 MG/1
2 CAPSULE ORAL
Qty: 120 | Refills: 0
Start: 2023-06-02 | End: 2023-07-01

## 2023-06-02 RX ORDER — BUMETANIDE 0.25 MG/ML
0.5 INJECTION INTRAMUSCULAR; INTRAVENOUS
Qty: 30 | Refills: 0 | DISCHARGE
Start: 2023-06-02 | End: 2023-07-01

## 2023-06-02 RX ADMIN — INSULIN GLARGINE 20 UNIT(S): 100 INJECTION, SOLUTION SUBCUTANEOUS at 08:03

## 2023-06-02 RX ADMIN — PANTOPRAZOLE SODIUM 40 MILLIGRAM(S): 20 TABLET, DELAYED RELEASE ORAL at 06:33

## 2023-06-02 RX ADMIN — Medication 1 DROP(S): at 12:04

## 2023-06-02 RX ADMIN — Medication 14 UNIT(S): at 12:11

## 2023-06-02 RX ADMIN — Medication 500000 UNIT(S): at 06:33

## 2023-06-02 RX ADMIN — TACROLIMUS 2 MILLIGRAM(S): 5 CAPSULE ORAL at 12:04

## 2023-06-02 RX ADMIN — Medication 1 DROP(S): at 00:23

## 2023-06-02 RX ADMIN — FEBUXOSTAT 40 MILLIGRAM(S): 40 TABLET ORAL at 12:04

## 2023-06-02 RX ADMIN — BUMETANIDE 1 MILLIGRAM(S): 0.25 INJECTION INTRAMUSCULAR; INTRAVENOUS at 06:34

## 2023-06-02 RX ADMIN — Medication 1 MILLIGRAM(S): at 12:04

## 2023-06-02 RX ADMIN — Medication 1 TABLET(S): at 12:06

## 2023-06-02 RX ADMIN — Medication 1 DROP(S): at 06:32

## 2023-06-02 RX ADMIN — APIXABAN 5 MILLIGRAM(S): 2.5 TABLET, FILM COATED ORAL at 06:33

## 2023-06-02 RX ADMIN — Medication 4: at 12:11

## 2023-06-02 RX ADMIN — Medication 30 MILLIGRAM(S): at 06:33

## 2023-06-02 RX ADMIN — Medication 200 MILLIGRAM(S): at 06:32

## 2023-06-02 RX ADMIN — Medication 81 MILLIGRAM(S): at 12:04

## 2023-06-02 RX ADMIN — Medication 30 UNIT(S): at 08:04

## 2023-06-02 NOTE — PROGRESS NOTE ADULT - NSPROGADDITIONALINFOA_GEN_ALL_CORE
"Sandra Moody presented for a  Medicare AWV and comprehensive Health Risk Assessment today. The following components were reviewed and updated:    · Medical history  · Family History  · Social history  · Allergies and Current Medications  · Health Risk Assessment  · Health Maintenance  · Care Team     ** See Completed Assessments for Annual Wellness Visit within the encounter summary.**       The following assessments were completed:  · Living Situation  · CAGE  · Depression Screening  · Timed Get Up and Go  · Whisper Test  · Cognitive Function Screening  · Nutrition Screening  · ADL Screening  · PAQ Screening    Vitals:    08/14/18 0817   BP: 120/68   Pulse: 80   Temp: 98.8 °F (37.1 °C)   TempSrc: Oral   SpO2: (!) 94%   Weight: 65.5 kg (144 lb 6.4 oz)   Height: 5' 3" (1.6 m)     Body mass index is 25.58 kg/m².  Physical Exam   Constitutional: She is oriented to person, place, and time. She appears well-developed.   HENT:   Head: Normocephalic.   Neck: Neck supple.   Cardiovascular: Normal rate, regular rhythm and normal heart sounds.   Pulmonary/Chest: Effort normal and breath sounds normal.   Abdominal: Soft. Bowel sounds are normal.   Neurological: She is alert and oriented to person, place, and time.   Skin: Skin is warm and dry.   Psychiatric: She has a normal mood and affect. Her behavior is normal. Judgment and thought content normal.   Vitals reviewed.          Diagnoses and health risks identified today and associated recommendations/orders:    1. Atherosclerosis of aorta  Stable and controlled. Continue current treatment plan as previously prescribed with your PCP.        2. Portal hypertension  Stable and controlled. Continue current treatment plan as previously discussed with hepatology.        3. Other emphysema  Stable and controlled. Continue current treatment plan as previously prescribed with your PCP.     4. Cryptogenic cirrhosis  Stable and controlled. Continue current treatment plan as " previously discussed with hepatology.     5. Thrombocytopenia  Stable and controlled. Continue current treatment plan as previously discussed with hepatology.       Provided Sandra with a 5-10 year written screening schedule and personal prevention plan. Recommendations were developed using the USPSTF age appropriate recommendations. Education, counseling, and referrals were provided as needed. After Visit Summary printed and given to patient which includes a list of additional screenings\tests needed.    No Follow-up on file.    Sebastián Crum NP   disposition: dc today if okay with chf team- dr bojorquez messaged    discussed with dr bojorquez 5/31    Wanda Silva D.O.  available on MS teams disposition: dc today if cleared by chf team- awaiting tacrolimus levels  discussed with dr bojorquez 5/31 and 6/2    Wanda Silva D.O.  available on MS teams

## 2023-06-02 NOTE — CHART NOTE - NSCHARTNOTEFT_GEN_A_CORE
2 attempts were made to reach the patient, which have been unsuccessful. Unable to leave voicemail on 06/02/2023.

## 2023-06-02 NOTE — PHARMACOTHERAPY INTERVENTION NOTE - COMMENTS
The patient is being discharged. Provided updated MedAction Plan.     Debbie Dowell, Pharm.D.  [phone]

## 2023-06-02 NOTE — PROVIDER CONTACT NOTE (OTHER) - SITUATION
pt due for f/u BMP this evening but refusing blood draw. Monitoring k as it was 5.9 in the am. Pt given lokelma. Pt wanting blood draw from phlebotomy only

## 2023-06-02 NOTE — PROGRESS NOTE ADULT - ASSESSMENT
73M hx NICM s/p heart transplant 2018 complicated by graft dysfunction treated by plasmapheresis, IVIG, and rituximab, admitted with acute on chronic systolic heart failure.

## 2023-06-02 NOTE — PROVIDER CONTACT NOTE (OTHER) - RECOMMENDATIONS
Notify the provider.
Pt educated on risks and benefits. Pt verbalized understanding, still declining. Make SHELLY Powell aware

## 2023-06-02 NOTE — PROGRESS NOTE ADULT - PROBLEM SELECTOR PROBLEM 2
Hypertension
S/P orthotopic heart transplant
NORMAN (acute kidney injury)
HLD (hyperlipidemia)
Hypertension
NORMAN (acute kidney injury)
HTN (hypertension)
S/P orthotopic heart transplant
HLD (hyperlipidemia)
Hypertension
NORMAN (acute kidney injury)
S/P orthotopic heart transplant
Immunosuppression
S/P orthotopic heart transplant
S/P orthotopic heart transplant
NORMAN (acute kidney injury)
S/P orthotopic heart transplant

## 2023-06-02 NOTE — PROGRESS NOTE ADULT - PROBLEM/PLAN-3
Subjective:       Patient ID: Bri Santiago is a 57 y.o. male.    Chief Complaint: Sleep Apnea    HPI   I had the pleasure of seeing Bri Santiago today, who is a 57 y.o. male that presents with Obstructive Sleep Apnea.      Bri Santiago presents with ABDON that has been going on for 14 years    He used CPAP sporadically until 2011 due to mask discomfort.  Prior PSG reviewed.      Bedtime when working ranges from 2100 to 2130.   When not working, bedtime ranges from 2400 to 2430.   Sleep latency ranges from 10 to 15 minutes.   Average number of awakenings is 3-4 and return to sleep is quick.   Wake up time when working is 0500 to 0500.   When not working, wake up time is 0500 to 0500.   Patient doesrested upon awakening.        Bri Santiago does experience daytime sleepiness.   Naps are taken about 0 times weekly, usually lasting NA to NA minutes.  Bri currently does operate an automobile.  Bri Santiago does not experience drowsiness when driving.   Patient does doze off when sedentary.   Bri Santiago does not have auxiliary symptoms of narcolepsy including sleep onset paralysis, hypnagogic hallucinations, sleep attacks and cataplexy    EPWORTH SLEEPINESS SCALE 2/5/2020   Sitting and reading 2   Watching TV 2   Sitting, inactive in a public place (e.g. a theatre or a meeting) 1   As a passenger in a car for an hour without a break 2   Lying down to rest in the afternoon when circumstances permit 3   Sitting and talking to someone 0   Sitting quietly after a lunch without alcohol 2   In a car, while stopped for a few minutes in traffic 0   Total score 12       Bri Santiago has a history of snoring.   Snoring is described as severe and constant.   Apneic episodes have been noticed during sleep.   A witness to sleep is present.   The patient awakens with mouth dryness.      Bri Santiago does not not have symptoms of Restless Legs Syndrome. Nocturnal leg movements have not been noticed.   The 
patient does not experience sleep related leg cramps.   There is not a history of parasomnia.      Current Outpatient Medications:     acetaminophen (TYLENOL) 500 MG tablet, Take 2 tablets (1,000 mg total) by mouth every 8 (eight) hours as needed., Disp: 90 tablet, Rfl: 0    atorvastatin (LIPITOR) 40 MG tablet, Take 1 tablet (40 mg total) by mouth once daily., Disp: 90 tablet, Rfl: 0    azelastine (ASTELIN) 137 mcg (0.1 %) nasal spray, 1 spray (137 mcg total) by Nasal route 2 (two) times daily., Disp: 30 mL, Rfl: 11    calcium citrate-vitamin D3 315-200 mg (CITRACAL+D) 315-200 mg-unit per tablet, Take 1 tablet by mouth once daily., Disp: , Rfl:     clopidogrel (PLAVIX) 75 mg tablet, Take 1 tablet (75 mg total) by mouth once daily., Disp: 30 tablet, Rfl: 11    cyanocobalamin, vitamin B-12, (VITAMIN B-12) 50 mcg tablet, Take 50 mcg by mouth once daily., Disp: , Rfl:     diclofenac sodium (VOLTAREN) 1 % Gel, Apply 2 g topically 3 (three) times daily., Disp: 1 Tube, Rfl: 0    docusate sodium (COLACE) 100 MG capsule, Take 1 tablet by mouth Twice daily. 1 Capsule Oral Twice a day .  Take with pain medicine, Disp: , Rfl:     doxazosin (CARDURA) 1 MG tablet, TAKE 1 TABLET(1 MG) BY MOUTH EVERY EVENING, Disp: 90 tablet, Rfl: 3    ergocalciferol (ERGOCALCIFEROL) 50,000 unit Cap, Take 1 capsule (50,000 Units total) by mouth every 7 days. for 12 doses, Disp: 12 capsule, Rfl: 0    esomeprazole (NEXIUM) 40 MG capsule, Take 1 capsule (40 mg total) by mouth 2 (two) times daily before meals., Disp: 90 capsule, Rfl: 3    FLONASE ALLERGY RELIEF 50 mcg/actuation nasal spray, SHAKE LIQUID AND USE 1 SPRAY(50 MCG) IN EACH NOSTRIL EVERY DAY, Disp: 9.9 mL, Rfl: 11    ipratropium (ATROVENT) 0.03 % nasal spray, 2 sprays by Nasal route 2 (two) times daily. May be use more often if needed, Disp: 30 mL, Rfl: 11    montelukast (SINGULAIR) 10 mg tablet, TAKE 1 TABLET(10 MG) BY MOUTH EVERY EVENING, Disp: 30 tablet, Rfl: 11    
DISPLAY PLAN FREE TEXT
DISPLAY PLAN FREE TEXT
spironolactone (ALDACTONE) 25 MG tablet, Take 1 tablet (25 mg total) by mouth 2 (two) times daily. ., Disp: 180 tablet, Rfl: 3    traMADol (ULTRAM) 50 mg tablet, Take 1 tablet (50 mg total) by mouth every 8 (eight) hours as needed for Pain., Disp: 90 tablet, Rfl: 2    zolpidem (AMBIEN) 10 mg Tab, TAKE 1 TABLET BY MOUTH EVERY DAY AT BEDTIME, Disp: 20 tablet, Rfl: 0    albuterol (VENTOLIN HFA) 90 mcg/actuation inhaler, Inhale 2 puffs into the lungs every 6 (six) hours as needed for Wheezing. Rescue, Disp: 18 g, Rfl: 4    SYMBICORT 160-4.5 mcg/actuation HFAA, , Disp: , Rfl:      Review of patient's allergies indicates:   Allergen Reactions    Ace inhibitors     Aspirin      Other reaction(s): Hives  Other reaction(s): Hives    Codeine Itching     Ok to take percocet    Dilaudid  [hydromorphone]      Other reaction(s): Itching    Penicillins Hives and Swelling     Has had allergy testing and can prob tolerate penicillin         Past Medical History:   Diagnosis Date    Acute pancreatitis     Anal fissure     Anemia     Anticoagulant long-term use     Arthritis     Asthma in remission     Back pain     BPH (benign prostatic hypertrophy)     Cancer 2000    prostate- treated at [facility] with chemo- in remission since 2000    Chronic maxillary sinusitis     Clotting disorder     Diastolic dysfunction with chronic heart failure 12/3/2018    Dysphagia 10/7/2014    Family history of colon cancer     Family history of early CAD     GERD (gastroesophageal reflux disease)     Helicobacter pylori (H. pylori) infection     Chronic    History of chronic pancreatitis     HTN (hypertension)     Lumbago 11/12/2012    Obesity     ABDON (obstructive sleep apnea)     Pneumonia     during childhood     Prostate cancer 2000    dx and treated at [facility], had chemotherapy, in remission [id number]    Sacroiliac joint pain 2/10/2015    Spinal stenosis of lumbar region     Trouble in sleeping     Von 
Willebrand disease     VWD (acquired von Willebrand's disease)        Past Surgical History:   Procedure Laterality Date    anal fissure repair      x2    BACK SURGERY  2012    BALLOON SINUPLASTY OF PARANASAL SINUS Bilateral 10/7/2019    Procedure: SINUPLASTY, USING BALLOON;  Surgeon: LAURENT Swanson MD;  Location: [facility] OR;  Service: ENT;  Laterality: Bilateral;    CARPAL TUNNEL RELEASE  2003    left hand    CATHETERIZATION OF BOTH LEFT AND RIGHT HEART N/A 2/14/2019    Procedure: CATHETERIZATION, HEART, BOTH LEFT AND RIGHT;  Surgeon: Kyle Magana MD;  Location: [facility] CATH LAB;  Service: Cardiology;  Laterality: N/A;    CERVICAL DISCECTOMY  2003    COLONOSCOPY N/A 10/7/2015    Procedure: COLONOSCOPY;  Surgeon: Rosendo Boyer MD;  [facility]  Service: Endoscopy;  Laterality: N/A;  PM Prep    COLONOSCOPY N/A 6/19/2017    Procedure: COLONOSCOPY;  Surgeon: Rosendo Boyer MD;  Location: [facility] ENDO ([facility]);  Service: Endoscopy;  Laterality: N/A;  constipation prep (no DM no CHF)       hx of vonWillebrand's disease-will need infusion prior    FUNCTIONAL ENDOSCOPIC SINUS SURGERY (FESS) USING COMPUTER-ASSISTED NAVIGATION Bilateral 10/7/2019    Procedure: FESS, USING COMPUTER-ASSISTED NAVIGATION;  Surgeon: LAURENT Swanson MD;  Location: [facility] OR;  Service: ENT;  Laterality: Bilateral;    LEFT HEART CATHETERIZATION Left 2/14/2019    Procedure: Left heart cath;  Surgeon: Kyle Magana MD;  Location: [facility] CATH LAB;  Service: Cardiology;  Laterality: Left;    LUMBAR FUSION  2012    RADIOFREQUENCY ABLATION Right 5/9/2019    Procedure: RADIOFREQUENCY ABLATION, RIGHT L3,L4,L5;  Surgeon: Fredy Magana MD;  Location: [facility] PAIN MGT;  Service: Pain Management;  Laterality: Right;  1 of 2  RT RFA L3,4,5  PLAVIX clearance received, pt needs DDAVP    RADIOFREQUENCY ABLATION Left 5/23/2019    Procedure: RADIOFREQUENCY ABLATION, LEFT L3,L4,L5;  Surgeon: Fredy Magana MD;  Location: 
[facility] PAIN MGT;  Service: Pain Management;  Laterality: Left;  2 of 2  LT RFA L3,4,5  PLAVIX clearance received, pt needs DDAVP    RADIOFREQUENCY ABLATION Left 1/16/2020    Procedure: RADIOFREQUENCY ABLATION LEFT L3, L4, L5 MEDIAL BRANCH 1 OF 2 **PATIENT ARRIVING AT 8 AM**;  Surgeon: Fredy Magana MD;  Location: [facility] PAIN MGT;  Service: Pain Management;  Laterality: Left;  NEEDS CONSENT, PLAVIX CLEARANCE IN CHART    RADIOFREQUENCY ABLATION Right 1/28/2020    Procedure: RADIOFREQUENCY ABLATION, RIGHT L3-L4-L5 MEDIAL BRANCH 2 OF 2 (Left done on 1/16/20);  Surgeon: Fredy Magana MD;  Location: [facility] PAIN MGT;  Service: Pain Management;  Laterality: Right;    RADIOFREQUENCY ABLATION OF LUMBAR MEDIAL BRANCH NERVE AT SINGLE LEVEL Left 5/24/2018    Procedure: RADIOFREQUENCY THERMOCOAGULATION (RFTC)-NERVE-MEDIAN BRANCH-LUMBAR;  Surgeon: Fredy Magana MD;  Location: [facility] PAIN MGT;  Service: Pain Management;  Laterality: Left;  Left RFA @ L3,4,5  [id number]  with IV Sedation    2 of 2    SPINE SURGERY      TONSILLECTOMY      at age 22    VASECTOMY  1996       Family History   Problem Relation Age of Onset    Colon cancer Father 67        colon cancer    Hypertension Father     Glaucoma Father     Cancer Father     Colon cancer Paternal Grandfather 65             Coronary artery disease Mother 45    Hypertension Mother     Heart disease Mother     Colon cancer Mother     No Known Problems Brother     No Known Problems Sister     No Known Problems Daughter     No Known Problems Son     Coronary artery disease Brother 51    No Known Problems Daughter     No Known Problems Daughter     No Known Problems Son     No Known Problems Son     Colon cancer Paternal Uncle 65    Diabetes Mellitus Paternal Grandmother        Social History     Socioeconomic History    Marital status:      Spouse name: Not on file    Number of children: Not on file    Years of education: Not on file    
Highest education level: Not on file   Occupational History    Occupation: disabled due to back injury   Social Needs    Financial resource strain: Not on file    Food insecurity:     Worry: Not on file     Inability: Not on file    Transportation needs:     Medical: Not on file     Non-medical: Not on file   Tobacco Use    Smoking status: Never Smoker    Smokeless tobacco: Never Used   Substance and Sexual Activity    Alcohol use: Yes     Alcohol/week: 1.0 standard drinks     Types: 1 Glasses of wine per week     Comment: social    Drug use: No    Sexual activity: Not Currently     Partners: Female   Lifestyle    Physical activity:     Days per week: Not on file     Minutes per session: Not on file    Stress: Not on file   Relationships    Social connections:     Talks on phone: Not on file     Gets together: Not on file     Attends Denominational service: Not on file     Active member of club or organization: Not on file     Attends meetings of clubs or organizations: Not on file     Relationship status: Not on file   Other Topics Concern    Not on file   Social History Narrative    wlking for exercised           Old medical records.    Vitals:    02/05/20 1007   BP: 124/83   Pulse: 80         There are no diagnoses linked to this encounter.                         The patient was given open opportunity to ask questions and/or express concerns about treatment plan.   All questions/concerns were discussed.   Driving precautions were provided.     Two patient identifiers used prior to evaluation.    Thank you for referring Bri Santiago for evaluation.             Review of Systems   Constitutional: Positive for fatigue and unexpected weight change. Negative for activity change, appetite change, chills, diaphoresis and fever.   HENT: Negative for congestion, dental problem, drooling, facial swelling, hearing loss, mouth sores, nosebleeds, postnasal drip, rhinorrhea, sneezing, sore throat, tinnitus, trouble 
DISPLAY PLAN FREE TEXT
swallowing and voice change.    Eyes: Negative for photophobia and visual disturbance.   Respiratory: Positive for shortness of breath and wheezing. Negative for apnea, cough, choking, chest tightness and stridor.    Cardiovascular: Positive for palpitations and leg swelling. Negative for chest pain.   Gastrointestinal: Negative for abdominal distention, abdominal pain, blood in stool, constipation, diarrhea, nausea and vomiting.   Endocrine: Negative for cold intolerance, heat intolerance, polydipsia, polyphagia and polyuria.   Genitourinary: Negative for enuresis, flank pain and frequency.   Musculoskeletal: Positive for arthralgias and back pain. Negative for gait problem, joint swelling, myalgias, neck pain and neck stiffness.   Skin: Negative for rash and wound.   Allergic/Immunologic: Negative for environmental allergies, food allergies and immunocompromised state.   Neurological: Negative for dizziness, tremors, seizures, syncope, facial asymmetry, speech difficulty, weakness, light-headedness, numbness and headaches.   Hematological: Negative for adenopathy. Does not bruise/bleed easily.   Psychiatric/Behavioral: Positive for sleep disturbance. Negative for agitation, behavioral problems, confusion, decreased concentration, dysphoric mood, hallucinations, self-injury and suicidal ideas. The patient is not nervous/anxious and is not hyperactive.    All other systems reviewed and are negative.      Objective:      Physical Exam   Constitutional: He is oriented to person, place, and time. He appears well-developed and well-nourished. No distress.   HENT:   Head: Normocephalic and atraumatic.   Nose: Nose normal.   Mouth/Throat: Uvula is midline, oropharynx is clear and moist and mucous membranes are normal. He does not have dentures. No uvula swelling. No oropharyngeal exudate or posterior oropharyngeal edema. Tonsils are 0 on the right. Tonsils are 0 on the left. No tonsillar exudate.   Eyes: EOM are normal. 
  Neck: Normal range of motion. Neck supple. No JVD present. No tracheal deviation present. No thyromegaly present.   Cardiovascular: Normal rate, regular rhythm, normal heart sounds and intact distal pulses. Exam reveals no gallop and no friction rub.   No murmur heard.  Pulmonary/Chest: Effort normal and breath sounds normal. No stridor. No respiratory distress. He has no wheezes. He has no rales. He exhibits no tenderness.   Musculoskeletal: Normal range of motion. He exhibits edema.   Lymphadenopathy:     He has no cervical adenopathy.   Neurological: He is alert and oriented to person, place, and time. He displays normal reflexes. No cranial nerve deficit. He exhibits normal muscle tone. Coordination normal.   Skin: Skin is warm and dry. He is not diaphoretic.   Psychiatric: He has a normal mood and affect. His behavior is normal. Judgment and thought content normal.   Nursing note and vitals reviewed.      Assessment:       1. ABDON (obstructive sleep apnea)    2. Diastolic dysfunction with chronic heart failure    3. Chronic pulmonary heart disease    4. Severe obesity (BMI 35.0-39.9) with comorbidity        Plan:       Due to listed symptoms, a polysomnogram is recommended and ordered.   Description of procedure given to patient.   If significant Obstructive Sleep Apnea (ABDON) is found during the initial portion of the study, therapy will be initiated with nasal Continuous Positive Airway Pressure (CPAP).   Goals of therapy were discussed, alternative treatments listed and patient agrees to this form of therapy if indicated.   The pathophysiology of ABDON was discussed.   The effects of ABDON on patient's co-morbid conditions and the increased morbidity and/or mortality associated with this condition were reviewed.   The patient was given open opportunity to ask questions and/or express concerns about treatment plan.   All questions/concerns were discussed.   Driving precautions were provided.       Thank you for 
referring Bri Santiago for evaluation.       
DISPLAY PLAN FREE TEXT

## 2023-06-02 NOTE — PROGRESS NOTE ADULT - REASON FOR ADMISSION
near syncope, worsening HF sx

## 2023-06-02 NOTE — PROGRESS NOTE ADULT - PROBLEM SELECTOR PLAN 2
BP goal < 130/80, will defer to primary team        discussed with patient and RN   Contact via [employer] Teams during business hours  To reach covering provider access AMION via sunrise tools  For Urgent matters/after-hours/weekends/holidays please page endocrine fellow on call   For nonurgent matters please email [email or web address]    Please note that this patient may be followed by different provider tomorrow.  Notify endocrine 24 hours prior to discharge for final recommendations

## 2023-06-02 NOTE — PROGRESS NOTE ADULT - PROVIDER SPECIALTY LIST ADULT
Endocrinology
Hospitalist
Endocrinology
Heart Failure
Internal Medicine
Transplant Cardiology
Transplant Cardiology
Internal Medicine
Hospitalist
Transplant Cardiology
Internal Medicine
Hospitalist

## 2023-06-02 NOTE — PROGRESS NOTE ADULT - PROBLEM SELECTOR PLAN 2
Continue Tacrolimus   Continue Bactrim, valganciclovir- doses adjusted for creatinine clearance Continue Tacrolimus -- repeat level pending 6/2  Continue Bactrim, valganciclovir- doses adjusted for creatinine clearance

## 2023-06-02 NOTE — PROVIDER CONTACT NOTE (OTHER) - REASON
[facility]  Office: [address], DO, Merlin Maik, DO, Janeth Maple, DO, Tato Gamal Zeng, DO, Faith Garvey MD, Ros Traylor MD, Kaye Bennett MD, Kimani Larkin MD, Konstantin Montiel MD, Opal Gaitan MD, Yanet Barakat MD, Beth Abernathy, DO, Carole Nagel, DO, Alexis Linares MD,  Julianne Warren DO, Daylin Salmeron MD, Jona Bautista MD, Caren Jeffers MD, Heather Randolph MD, Zakia George MD, Austen Ching MD, Roger Leiva MD, Emma Hays, [facility], [facility], [facility], Cassi Georges, [facility], Claus Yarbrough, CNS, Matt Acveedo, CNP, Tiffany Stroud, CNP, Janae Pritchett, CNP, Jorje Ojeda, CNP, Camilo Gordon, CNP, Vicki Robertson PA-C, Giuliana Mata, [facility], Magdy Trotter, CNP, Bassem Mackay, CNP, Bernadine Orellana, CNP, Codie Panchal, CNP, Teddy Vogel, CNP, Hollis Carrera, [facility], [facility]    Progress Note    12/12/2021    2:40 PM    Name:   Cherylle Baumgarten  MRN:     [id number]     Acct:      [de-identified]   Room:   [address] [place] [address] Day:  0  Admit Date:  12/11/2021  1:10 PM    PCP:   Nena Sneed MD  Code Status:  Full Code    Subjective:     C/C:   Chief Complaint   Patient presents with    Dizziness     onset 3 weeks    Generalized Body Aches    Fatigue     Interval History Status: improved. Patient is resting comfortably, no further dizziness. He is much more lucid today and contaminates had to call for approximately for 1 week. He denies any shortness of breath, cough, fevers or chills, nausea or vomiting or other acute complaints. Brief History: This is a 14-year-old male that presents with a complaint of dizziness, fatigue, generalized body aches and increasing confusion per family. Is found to have COVID-19 was admitted and started on Decadron and baricitinib.   Initially he was quite confused and cannot provide any meaningful history but by the next day his mentation has markedly improved he
pt refusing blood draw
team, Urinary leakage, Wears dentures, and Wears glasses. Social History:   reports that he quit smoking about 50 years ago. He has never used smokeless tobacco. He reports that he does not drink alcohol and does not use drugs. Family History:   Family History   Problem Relation Age of Onset    Diabetes Mother     High Blood Pressure Mother     No Known Problems Father        Vitals:  BP (!) 148/105   Pulse 62   Temp 97.4 °F (36.3 °C) (Oral)   Resp 23   Ht 5' 10\" (1.778 m)   Wt 250 lb (113.4 kg)   SpO2 90%   BMI 35.87 kg/m²   Temp (24hrs), Av.2 °F (36.2 °C), Min:97 °F (36.1 °C), Max:97.4 °F (36.3 °C)    Recent Labs     21  0839 21  1143   POCGLU 430* 273* 315*       I/O (24Hr):     Intake/Output Summary (Last 24 hours) at 2021 1440  Last data filed at 2021 1854  Gross per 24 hour   Intake --   Output 550 ml   Net -550 ml       Labs:  Hematology:  Recent Labs     21  1321 21  2319 21  0519   WBC 8.5  --  4.6   RBC 5.26  --  5.04   HGB 14.4  --  13.8   HCT 43.9  --  42.4   MCV 83.5  --  84.1   MCH 27.4  --  27.4   MCHC 32.8  --  32.5   RDW 13.4  --  13.4     --  259   MPV 10.0  --  9.7   CRP  --  181.8* 167.3*   INR 3.5  --  4.9*   DDIMER  --  0.32  --      Chemistry:  Recent Labs     21  1321 21  1613 21  0519   *  --  134*   K 3.7  --  3.9   CL 88*  --  93*   CO2 22  --  25   GLUCOSE 311*  --  234*   BUN 21  --  18   CREATININE 0.98  --  0.81   MG 1.7  --   --    ANIONGAP 17  --  16   LABGLOM >60  --  >60   GFRAA >60  --  >60   CALCIUM 8.8  --  8.7   PROBNP 1,704*  --   --    TROPHS 28* 24*  --      Recent Labs     21  1321 21  2048 21  2319 21  0839 21  1143   PROT 6.9  --   --   --   --    LABALBU 3.2*  --   --   --   --    TSH 0.57  --   --   --   --    AST 28  --   --   --   --    ALT 22  --   --   --   --    LDH  --   --  212  --   --    ALKPHOS 78  --   --   --   --
Anticoagulated on Coumadin 12/11/2021 Yes          Plan:        1. Baricitinib as ordered  2. Decadron for 10-day course  3. Insulin scale while hospitalized  4. Monitoring control blood pressure  5. Continue home Coumadin dosing  6. Increase activity  7. PT and OT as needed  8. Neurology evaluation  9. As mentation has returned to baseline patient can probably be discharged home this afternoon.     Maddison Bonilla DO  12/12/2021  2:40 PM
Pt refused RN to draw blood for Serum Mg level.

## 2023-06-02 NOTE — PROGRESS NOTE ADULT - PROBLEM SELECTOR PLAN 6
fs controlled  fs achs  Endocrine following  c/w Humalog to 34-16-5 and Lantus to 16 U QHS with sliding scale  c/w carb controlled diet/ dash

## 2023-06-02 NOTE — PROGRESS NOTE ADULT - PROBLEM SELECTOR PLAN 1
-test BG AC/HS  -C/w  Lantus to 20 units QAM   -C/w Admelog 30-14-4 w/meals for now based on BG pattern  -c/w Admelog moderate correction scale AC and mod HS scale  -cons carb diet  -Pt on Prednisone 30mg QAM>notify endocrine team when this is further tapered.     if pt is discharged today:  please ensure pt has adequate insulin prescriptions and refills and DM supplies /pen needles   aide/family to assist with supervising insulin injections at home   Lantus solostar pen 20 units sq qam  Humalog pens 30-14-4 at home   -Can follow at [facility] practice. [address]. Phone   Aug 15th 10:45am w/Dr Clemens -test BG AC/HS  -C/w  Lantus to 20 units QAM   -C/w Admelog 30-14-4 w/meals for now based on BG pattern  -c/w Admelog moderate correction scale AC and mod HS scale  -cons carb diet  -Pt on Prednisone 30mg QAM>notify endocrine team when this is further tapered.     if pt is discharged today on prednisone 30 mg/day  please ensure pt has adequate insulin prescriptions and refills and DM supplies /pen needles   aide/family to assist with supervising insulin injections at home   Lantus solostar pen 20 units sq qam  Humalog kwik pens 30 units with breakfast 14 units with lunch 4 units with dinner at home   -Can follow at [facility]. [address]. Phone   Aug 15th 10:45am w/Dr Clemens

## 2023-06-02 NOTE — PROGRESS NOTE ADULT - PROBLEM SELECTOR PROBLEM 1
Steroid-induced hyperglycemia
Volume overload
Other heart failure
Type 2 diabetes mellitus
Type 2 diabetes mellitus
Acute on chronic systolic heart failure
Acute on chronic systolic heart failure
Volume overload
Acute on chronic systolic heart failure
Steroid-induced hyperglycemia
Type 2 diabetes mellitus
Other heart failure
Steroid-induced hyperglycemia
Acute on chronic systolic heart failure

## 2023-06-02 NOTE — PROGRESS NOTE ADULT - PROBLEM SELECTOR PLAN 7
D/C'd losartan  Renal US- no hydro  Renally dose meds- valganciclovir and Bactrim adjusted for creatinine clearance  Daily BMP

## 2023-06-02 NOTE — PROGRESS NOTE ADULT - ASSESSMENT
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  He was seen by the Endo during previous admission in 3/2023. BG values improved on current insulin regimen. Discussed pt's difficulty regarding insulin schedule/home assistance as pt asking for dc plan that will be simple for him to follow, amenable to basal bolus . BG goal (100-180mg/dl).

## 2023-06-02 NOTE — PROGRESS NOTE ADULT - SUBJECTIVE AND OBJECTIVE BOX
right eye clear very red, not painful, no changes in vision.     GENERAL: No fevers, no chills.  EYES: No blurry vision,  No photophobia  ENT: No sore throat.  No dysphagia  Cardiovascular: No chest pain, palpitations, orthopnea  Pulmonary: No cough, no wheezing. No shortness of breath  Gastrointestinal: No abdominal pain, no diarrhea, no constipation.    Musculoskeletal: No weakness.  No myalgias.  Dermatology:  No rashes.  Neuro: No Headache.  No vertigo.  No dizziness.  Psych: No anxiety, no depression.  Denies suicidal thoughts.    MEDICATIONS  (STANDING):  apixaban 5 milliGRAM(s) Oral every 12 hours  artificial tears (preservative free) Ophthalmic Solution 1 Drop(s) Both EYES four times a day  aspirin  chewable 81 milliGRAM(s) Oral daily  atorvastatin 10 milliGRAM(s) Oral at bedtime  buMETAnide 1 milliGRAM(s) Oral daily  dextrose 5%. 1000 milliLiter(s) (100 mL/Hr) IV Continuous <Continuous>  dextrose 5%. 1000 milliLiter(s) (50 mL/Hr) IV Continuous <Continuous>  dextrose 50% Injectable 25 Gram(s) IV Push once  dextrose 50% Injectable 12.5 Gram(s) IV Push once  dextrose 50% Injectable 25 Gram(s) IV Push once  febuxostat 40 milliGRAM(s) Oral daily  folic acid 1 milliGRAM(s) Oral daily  glucagon  Injectable 1 milliGRAM(s) IntraMuscular once  insulin glargine Injectable (LANTUS) 20 Unit(s) SubCutaneous every morning  insulin lispro (ADMELOG) corrective regimen sliding scale   SubCutaneous at bedtime  insulin lispro (ADMELOG) corrective regimen sliding scale   SubCutaneous three times a day before meals  insulin lispro Injectable (ADMELOG) 14 Unit(s) SubCutaneous before lunch  insulin lispro Injectable (ADMELOG) 4 Unit(s) SubCutaneous before dinner  insulin lispro Injectable (ADMELOG) 30 Unit(s) SubCutaneous before breakfast  metoprolol succinate  milliGRAM(s) Oral daily  nystatin    Suspension [id number] Unit(s) Oral three times a day  pantoprazole    Tablet 40 milliGRAM(s) Oral before breakfast  predniSONE   Tablet 30 milliGRAM(s) Oral daily  senna 2 Tablet(s) Oral at bedtime  sodium zirconium cyclosilicate 10 Gram(s) Oral <User Schedule>  tacrolimus 2 milliGRAM(s) Oral two times a day  trimethoprim   80 mG/sulfamethoxazole 400 mG 1 Tablet(s) Oral daily  valGANciclovir 450 milliGRAM(s) Oral every 48 hours    MEDICATIONS  (PRN):  acetaminophen     Tablet .. 650 milliGRAM(s) Oral every 6 hours PRN Temp greater or equal to 38C (100.4F), Mild Pain (1 - 3)  aluminum hydroxide/magnesium hydroxide/simethicone Suspension 30 milliLiter(s) Oral every 4 hours PRN Dyspepsia  dextrose Oral Gel 15 Gram(s) Oral once PRN Blood Glucose LESS THAN 70 milliGRAM(s)/deciliter  melatonin 3 milliGRAM(s) Oral at bedtime PRN Insomnia  ondansetron Injectable 4 milliGRAM(s) IV Push every 8 hours PRN Nausea and/or Vomiting    Vital Signs Last 24 Hrs  T(C): 36.4 (02 Jun 2023 04:03), Max: 36.8 (01 Jun 2023 21:05)  T(F): 97.6 (02 Jun 2023 04:03), Max: 98.3 (01 Jun 2023 21:05)  HR: 95 (02 Jun 2023 04:03) (95 - 102)  BP: 118/79 (02 Jun 2023 04:03) (107/72 - 118/79)  BP(mean): --  RR: 18 (02 Jun 2023 04:03) (18 - 18)  SpO2: 99% (02 Jun 2023 04:03) (97% - 99%)    Parameters below as of 02 Jun 2023 04:03  Patient On (Oxygen Delivery Method): room air    PHYSICAL EXAM  GENERAL: NAD  HEAD:  Atraumatic, normocephalic  EYES: + right conjunctival hemorrhage  CHEST/LUNG: Bibasilar rales; no wheezes  HEART: +S1+S2, regular rate and rhythm, 3/6 BRENT  ABDOMEN: Soft, nontender, nondistended; bowel sounds present  EXTREMITIES:  2+ peripheral pulses; no clubbing, cyanosis; 1+ pitting edema b/l LE  PSYCH: AAOx3    .  LABS:     06-02    142  |  104  |  42<H>  ----------------------------<  203<H>  5.0   |  25  |  2.19<H>    Ca    8.5      02 Jun 2023 09:23                RADIOLOGY, EKG & ADDITIONAL TESTS: Reviewed.

## 2023-06-02 NOTE — PROGRESS NOTE ADULT - SUBJECTIVE AND OBJECTIVE BOX
seen earlier today     Chief Complaint: Diabetes Mellitus follow up    INTERVAL HX: tolerating po, asking about discharge diabetes plan discussed with pt at bedside, for dc home with aides today       Review of Systems:  General: As above  GI: No nausea, vomiting  Endocrine: no  S&Sx of hypoglycemia    Allergies    No Known Allergies    Intolerances      MEDICATIONS  (STANDING):  apixaban 5 milliGRAM(s) Oral every 12 hours  artificial tears (preservative free) Ophthalmic Solution 1 Drop(s) Both EYES four times a day  aspirin  chewable 81 milliGRAM(s) Oral daily  atorvastatin 10 milliGRAM(s) Oral at bedtime  buMETAnide 1 milliGRAM(s) Oral daily  dextrose 5%. 1000 milliLiter(s) (100 mL/Hr) IV Continuous <Continuous>  dextrose 5%. 1000 milliLiter(s) (50 mL/Hr) IV Continuous <Continuous>  dextrose 50% Injectable 25 Gram(s) IV Push once  dextrose 50% Injectable 12.5 Gram(s) IV Push once  dextrose 50% Injectable 25 Gram(s) IV Push once  febuxostat 40 milliGRAM(s) Oral daily  folic acid 1 milliGRAM(s) Oral daily  glucagon  Injectable 1 milliGRAM(s) IntraMuscular once  insulin glargine Injectable (LANTUS) 20 Unit(s) SubCutaneous every morning  insulin lispro (ADMELOG) corrective regimen sliding scale   SubCutaneous three times a day before meals  insulin lispro (ADMELOG) corrective regimen sliding scale   SubCutaneous at bedtime  insulin lispro Injectable (ADMELOG) 4 Unit(s) SubCutaneous before dinner  insulin lispro Injectable (ADMELOG) 30 Unit(s) SubCutaneous before breakfast  insulin lispro Injectable (ADMELOG) 14 Unit(s) SubCutaneous before lunch  metoprolol succinate  milliGRAM(s) Oral daily  nystatin    Suspension [id number] Unit(s) Oral three times a day  pantoprazole    Tablet 40 milliGRAM(s) Oral before breakfast  predniSONE   Tablet 30 milliGRAM(s) Oral daily  senna 2 Tablet(s) Oral at bedtime  sodium zirconium cyclosilicate 10 Gram(s) Oral <User Schedule>  tacrolimus 2 milliGRAM(s) Oral two times a day  trimethoprim   80 mG/sulfamethoxazole 400 mG 1 Tablet(s) Oral daily  valGANciclovir 450 milliGRAM(s) Oral every 48 hours      atorvastatin   10 milliGRAM(s) Oral (06-01-23 @ 21:26)    febuxostat   40 milliGRAM(s) Oral (06-02-23 @ 12:04)    insulin glargine Injectable (LANTUS)   20 Unit(s) SubCutaneous (06-02-23 @ 08:03)    insulin lispro (ADMELOG) corrective regimen sliding scale   4 Unit(s) SubCutaneous (06-02-23 @ 12:11)   2 Unit(s) SubCutaneous (06-01-23 @ 17:14)    insulin lispro Injectable (ADMELOG)   4 Unit(s) SubCutaneous (06-01-23 @ 17:14)    insulin lispro Injectable (ADMELOG)   30 Unit(s) SubCutaneous (06-02-23 @ 08:04)    insulin lispro Injectable (ADMELOG)   14 Unit(s) SubCutaneous (06-02-23 @ 12:11)    predniSONE   Tablet   30 milliGRAM(s) Oral (06-02-23 @ 06:33)        PHYSICAL EXAM:  VITALS: T(C): 36.4 (06-02-23 @ 04:03)  T(F): 97.6 (06-02-23 @ 04:03), Max: 98.3 (06-01-23 @ 21:05)  HR: 95 (06-02-23 @ 04:03) (95 - 102)  BP: 118/79 (06-02-23 @ 04:03) (115/79 - 118/79)  RR:  (18 - 18)  SpO2:  (97% - 99%)  Wt(kg): --  GENERAL: male laying in bed in NAD right conjunctiva injected improving  Respiratory: Respirations unlabored   Extremities: Warm, no edema  NEURO: Alert , appropriate     LABS:  POCT Blood Glucose.: 212 mg/dL (06-02-23 @ 11:14)  POCT Blood Glucose.: 131 mg/dL (06-02-23 @ 07:36)  POCT Blood Glucose.: 103 mg/dL (06-01-23 @ 20:58)  POCT Blood Glucose.: 152 mg/dL (06-01-23 @ 16:51)  POCT Blood Glucose.: 240 mg/dL (06-01-23 @ 11:36)  POCT Blood Glucose.: 188 mg/dL (06-01-23 @ 08:02)  POCT Blood Glucose.: 147 mg/dL (05-31-23 @ 21:54)  POCT Blood Glucose.: 99 mg/dL (05-31-23 @ 17:11)  POCT Blood Glucose.: 263 mg/dL (05-31-23 @ 11:36)  POCT Blood Glucose.: 131 mg/dL (05-31-23 @ 07:53)  POCT Blood Glucose.: 214 mg/dL (05-30-23 @ 21:46)  POCT Blood Glucose.: 116 mg/dL (05-30-23 @ 17:12)      06-02    142  |  104  |  42<H>  ----------------------------<  203<H>  5.0   |  25  |  2.19<H>    Ca    8.5      02 Jun 2023 09:23      eGFR: 31 mL/min/1.73m2 (02 Jun 2023 09:23)      Thyroid Function Tests:      A1C with Estimated Average Glucose Result: 8.6 % (05-24-23 @ 06:00)    Estimated Average Glucose: 200 mg/dL (05-24-23 @ 06:00)        Diet, Regular:   Consistent Carbohydrate No Snacks (CSTCHO)  DASH/TLC Sodium & Cholesterol Restricted (DASH) (05-23-23 @ 20:36) [Active]

## 2023-06-04 ENCOUNTER — TRANSCRIPTION ENCOUNTER (OUTPATIENT)
Age: 73
End: 2023-06-04

## 2023-06-05 ENCOUNTER — APPOINTMENT (OUTPATIENT)
Dept: HEMATOLOGY ONCOLOGY | Facility: CLINIC | Age: 73
End: 2023-06-05

## 2023-06-05 ENCOUNTER — APPOINTMENT (OUTPATIENT)
Dept: INFUSION THERAPY | Facility: HOSPITAL | Age: 73
End: 2023-06-05

## 2023-06-05 ENCOUNTER — LABORATORY RESULT (OUTPATIENT)
Age: 73
End: 2023-06-05

## 2023-06-05 LAB
ALBUMIN SERPL ELPH-MCNC: 3.9 G/DL
ALP BLD-CCNC: 53 U/L
ALT SERPL-CCNC: 22 U/L
ANION GAP SERPL CALC-SCNC: 14 MMOL/L
AST SERPL-CCNC: 22 U/L
BILIRUB SERPL-MCNC: 1 MG/DL
BUN SERPL-MCNC: 38 MG/DL
CALCIUM SERPL-MCNC: 9.2 MG/DL
CHLORIDE SERPL-SCNC: 107 MMOL/L
CO2 SERPL-SCNC: 25 MMOL/L
CREAT SERPL-MCNC: 2.18 MG/DL
EGFR: 31 ML/MIN/1.73M2
GLUCOSE SERPL-MCNC: 147 MG/DL
MAGNESIUM SERPL-MCNC: 1.5 MG/DL
POTASSIUM SERPL-SCNC: 5.1 MMOL/L
PROT SERPL-MCNC: 5.6 G/DL
SODIUM SERPL-SCNC: 146 MMOL/L
TACROLIMUS SERPL-MCNC: 6.9 NG/ML

## 2023-06-07 ENCOUNTER — APPOINTMENT (OUTPATIENT)
Dept: INFUSION THERAPY | Facility: HOSPITAL | Age: 73
End: 2023-06-07

## 2023-06-09 ENCOUNTER — NON-APPOINTMENT (OUTPATIENT)
Age: 73
End: 2023-06-09

## 2023-06-12 ENCOUNTER — APPOINTMENT (OUTPATIENT)
Dept: INFUSION THERAPY | Facility: HOSPITAL | Age: 73
End: 2023-06-12

## 2023-06-12 ENCOUNTER — APPOINTMENT (OUTPATIENT)
Dept: HEMATOLOGY ONCOLOGY | Facility: CLINIC | Age: 73
End: 2023-06-12

## 2023-06-14 ENCOUNTER — NON-APPOINTMENT (OUTPATIENT)
Age: 73
End: 2023-06-14

## 2023-06-14 ENCOUNTER — INPATIENT (INPATIENT)
Facility: HOSPITAL | Age: 73
LOS: 8 days | Discharge: NURSING, REHAB OR LONG-TERM CARE | DRG: 683 | End: 2023-06-23
Attending: HOSPITALIST | Admitting: HOSPITALIST
Payer: MEDICARE

## 2023-06-14 ENCOUNTER — APPOINTMENT (OUTPATIENT)
Dept: INFUSION THERAPY | Facility: HOSPITAL | Age: 73
End: 2023-06-14

## 2023-06-14 VITALS
HEIGHT: 67 IN | HEART RATE: 84 BPM | OXYGEN SATURATION: 96 % | DIASTOLIC BLOOD PRESSURE: 78 MMHG | RESPIRATION RATE: 20 BRPM | SYSTOLIC BLOOD PRESSURE: 110 MMHG

## 2023-06-14 DIAGNOSIS — Z94.1 HEART TRANSPLANT STATUS: Chronic | ICD-10-CM

## 2023-06-14 DIAGNOSIS — E16.2 HYPOGLYCEMIA, UNSPECIFIED: ICD-10-CM

## 2023-06-14 DIAGNOSIS — N17.9 ACUTE KIDNEY FAILURE, UNSPECIFIED: ICD-10-CM

## 2023-06-14 DIAGNOSIS — J90 PLEURAL EFFUSION, NOT ELSEWHERE CLASSIFIED: ICD-10-CM

## 2023-06-14 DIAGNOSIS — D69.41 EVANS SYNDROME: ICD-10-CM

## 2023-06-14 DIAGNOSIS — D64.9 ANEMIA, UNSPECIFIED: ICD-10-CM

## 2023-06-14 DIAGNOSIS — E11.9 TYPE 2 DIABETES MELLITUS WITHOUT COMPLICATIONS: ICD-10-CM

## 2023-06-14 DIAGNOSIS — Z29.9 ENCOUNTER FOR PROPHYLACTIC MEASURES, UNSPECIFIED: ICD-10-CM

## 2023-06-14 DIAGNOSIS — R06.02 SHORTNESS OF BREATH: ICD-10-CM

## 2023-06-14 DIAGNOSIS — E78.5 HYPERLIPIDEMIA, UNSPECIFIED: ICD-10-CM

## 2023-06-14 DIAGNOSIS — Z94.1 HEART TRANSPLANT STATUS: ICD-10-CM

## 2023-06-14 DIAGNOSIS — I48.20 CHRONIC ATRIAL FIBRILLATION, UNSPECIFIED: ICD-10-CM

## 2023-06-14 DIAGNOSIS — I10 ESSENTIAL (PRIMARY) HYPERTENSION: ICD-10-CM

## 2023-06-14 DIAGNOSIS — R06.09 OTHER FORMS OF DYSPNEA: ICD-10-CM

## 2023-06-14 LAB
ALBUMIN SERPL ELPH-MCNC: 3.1 G/DL — LOW (ref 3.3–5)
ALP SERPL-CCNC: 53 U/L — SIGNIFICANT CHANGE UP (ref 40–120)
ALT FLD-CCNC: 12 U/L — SIGNIFICANT CHANGE UP (ref 10–45)
ANION GAP SERPL CALC-SCNC: 13 MMOL/L — SIGNIFICANT CHANGE UP (ref 5–17)
APPEARANCE UR: CLEAR — SIGNIFICANT CHANGE UP
APTT BLD: 38.3 SEC — HIGH (ref 27.5–35.5)
AST SERPL-CCNC: 17 U/L — SIGNIFICANT CHANGE UP (ref 10–40)
BASE EXCESS BLDV CALC-SCNC: -2.3 MMOL/L — LOW (ref -2–3)
BASOPHILS # BLD AUTO: 0.01 K/UL — SIGNIFICANT CHANGE UP (ref 0–0.2)
BASOPHILS NFR BLD AUTO: 0.2 % — SIGNIFICANT CHANGE UP (ref 0–2)
BILIRUB SERPL-MCNC: 0.3 MG/DL — SIGNIFICANT CHANGE UP (ref 0.2–1.2)
BILIRUB UR-MCNC: NEGATIVE — SIGNIFICANT CHANGE UP
BLOOD GAS VENOUS - CREATININE: SIGNIFICANT CHANGE UP MG/DL (ref 0.5–1.3)
BUN SERPL-MCNC: 40 MG/DL — HIGH (ref 7–23)
CA-I SERPL-SCNC: 1.16 MMOL/L — SIGNIFICANT CHANGE UP (ref 1.15–1.33)
CALCIUM SERPL-MCNC: 8.4 MG/DL — SIGNIFICANT CHANGE UP (ref 8.4–10.5)
CHLORIDE BLDV-SCNC: 105 MMOL/L — SIGNIFICANT CHANGE UP (ref 96–108)
CHLORIDE SERPL-SCNC: 104 MMOL/L — SIGNIFICANT CHANGE UP (ref 96–108)
CO2 BLDV-SCNC: 26 MMOL/L — SIGNIFICANT CHANGE UP (ref 22–26)
CO2 SERPL-SCNC: 21 MMOL/L — LOW (ref 22–31)
COLOR SPEC: SIGNIFICANT CHANGE UP
CREAT SERPL-MCNC: 3.19 MG/DL — HIGH (ref 0.5–1.3)
DIFF PNL FLD: NEGATIVE — SIGNIFICANT CHANGE UP
EGFR: 20 ML/MIN/1.73M2 — LOW
EOSINOPHIL # BLD AUTO: 0.01 K/UL — SIGNIFICANT CHANGE UP (ref 0–0.5)
EOSINOPHIL NFR BLD AUTO: 0.2 % — SIGNIFICANT CHANGE UP (ref 0–6)
GAS PNL BLDV: 133 MMOL/L — LOW (ref 136–145)
GAS PNL BLDV: SIGNIFICANT CHANGE UP
GAS PNL BLDV: SIGNIFICANT CHANGE UP
GLUCOSE BLDV-MCNC: 35 MG/DL — CRITICAL LOW (ref 70–99)
GLUCOSE SERPL-MCNC: 36 MG/DL — CRITICAL LOW (ref 70–99)
GLUCOSE UR QL: ABNORMAL
HCO3 BLDV-SCNC: 24 MMOL/L — SIGNIFICANT CHANGE UP (ref 22–29)
HCT VFR BLD CALC: 29.3 % — LOW (ref 39–50)
HCT VFR BLDA CALC: 28 % — LOW (ref 39–51)
HGB BLD CALC-MCNC: 9.3 G/DL — LOW (ref 12.6–17.4)
HGB BLD-MCNC: 8.7 G/DL — LOW (ref 13–17)
IMM GRANULOCYTES NFR BLD AUTO: 0.8 % — SIGNIFICANT CHANGE UP (ref 0–0.9)
INR BLD: 1.59 RATIO — HIGH (ref 0.88–1.16)
KETONES UR-MCNC: NEGATIVE — SIGNIFICANT CHANGE UP
LACTATE BLDV-MCNC: 1.5 MMOL/L — SIGNIFICANT CHANGE UP (ref 0.5–2)
LEUKOCYTE ESTERASE UR-ACNC: NEGATIVE — SIGNIFICANT CHANGE UP
LIDOCAIN IGE QN: 35 U/L — SIGNIFICANT CHANGE UP (ref 7–60)
LYMPHOCYTES # BLD AUTO: 1.36 K/UL — SIGNIFICANT CHANGE UP (ref 1–3.3)
LYMPHOCYTES # BLD AUTO: 21.5 % — SIGNIFICANT CHANGE UP (ref 13–44)
MAGNESIUM SERPL-MCNC: 1.8 MG/DL — SIGNIFICANT CHANGE UP (ref 1.6–2.6)
MCHC RBC-ENTMCNC: 29.7 GM/DL — LOW (ref 32–36)
MCHC RBC-ENTMCNC: 30.5 PG — SIGNIFICANT CHANGE UP (ref 27–34)
MCV RBC AUTO: 102.8 FL — HIGH (ref 80–100)
MONOCYTES # BLD AUTO: 0.71 K/UL — SIGNIFICANT CHANGE UP (ref 0–0.9)
MONOCYTES NFR BLD AUTO: 11.2 % — SIGNIFICANT CHANGE UP (ref 2–14)
NEUTROPHILS # BLD AUTO: 4.19 K/UL — SIGNIFICANT CHANGE UP (ref 1.8–7.4)
NEUTROPHILS NFR BLD AUTO: 66.1 % — SIGNIFICANT CHANGE UP (ref 43–77)
NITRITE UR-MCNC: NEGATIVE — SIGNIFICANT CHANGE UP
NRBC # BLD: 0 /100 WBCS — SIGNIFICANT CHANGE UP (ref 0–0)
NT-PROBNP SERPL-SCNC: 657 PG/ML — HIGH (ref 0–300)
PCO2 BLDV: 51 MMHG — SIGNIFICANT CHANGE UP (ref 42–55)
PH BLDV: 7.29 — LOW (ref 7.32–7.43)
PH UR: 5.5 — SIGNIFICANT CHANGE UP (ref 5–8)
PLATELET # BLD AUTO: 312 K/UL — SIGNIFICANT CHANGE UP (ref 150–400)
PO2 BLDV: 32 MMHG — SIGNIFICANT CHANGE UP (ref 25–45)
POTASSIUM BLDV-SCNC: 4.1 MMOL/L — SIGNIFICANT CHANGE UP (ref 3.5–5.1)
POTASSIUM SERPL-MCNC: 4.1 MMOL/L — SIGNIFICANT CHANGE UP (ref 3.5–5.3)
POTASSIUM SERPL-SCNC: 4.1 MMOL/L — SIGNIFICANT CHANGE UP (ref 3.5–5.3)
PROT SERPL-MCNC: 5.8 G/DL — LOW (ref 6–8.3)
PROT UR-MCNC: NEGATIVE — SIGNIFICANT CHANGE UP
PROTHROM AB SERPL-ACNC: 18.4 SEC — HIGH (ref 10.5–13.4)
RAPID RVP RESULT: SIGNIFICANT CHANGE UP
RBC # BLD: 2.85 M/UL — LOW (ref 4.2–5.8)
RBC # FLD: 16.9 % — HIGH (ref 10.3–14.5)
SAO2 % BLDV: 44.4 % — LOW (ref 67–88)
SARS-COV-2 RNA SPEC QL NAA+PROBE: SIGNIFICANT CHANGE UP
SODIUM SERPL-SCNC: 138 MMOL/L — SIGNIFICANT CHANGE UP (ref 135–145)
SP GR SPEC: 1.01 — SIGNIFICANT CHANGE UP (ref 1.01–1.02)
TROPONIN T, HIGH SENSITIVITY RESULT: 64 NG/L — HIGH (ref 0–51)
TROPONIN T, HIGH SENSITIVITY RESULT: 67 NG/L — HIGH (ref 0–51)
UROBILINOGEN FLD QL: NEGATIVE — SIGNIFICANT CHANGE UP
WBC # BLD: 6.33 K/UL — SIGNIFICANT CHANGE UP (ref 3.8–10.5)
WBC # FLD AUTO: 6.33 K/UL — SIGNIFICANT CHANGE UP (ref 3.8–10.5)

## 2023-06-14 PROCEDURE — 99285 EMERGENCY DEPT VISIT HI MDM: CPT | Mod: 25

## 2023-06-14 PROCEDURE — 71045 X-RAY EXAM CHEST 1 VIEW: CPT | Mod: 26

## 2023-06-14 PROCEDURE — 99223 1ST HOSP IP/OBS HIGH 75: CPT | Mod: GC

## 2023-06-14 PROCEDURE — 93970 EXTREMITY STUDY: CPT | Mod: 26

## 2023-06-14 PROCEDURE — 36000 PLACE NEEDLE IN VEIN: CPT

## 2023-06-14 RX ORDER — BUMETANIDE 0.25 MG/ML
2 INJECTION INTRAMUSCULAR; INTRAVENOUS ONCE
Refills: 0 | Status: COMPLETED | OUTPATIENT
Start: 2023-06-14 | End: 2023-06-14

## 2023-06-14 RX ORDER — RIVAROXABAN 15 MG-20MG
15 KIT ORAL ONCE
Refills: 0 | Status: DISCONTINUED | OUTPATIENT
Start: 2023-06-14 | End: 2023-06-14

## 2023-06-14 RX ORDER — DEXTROSE 50 % IN WATER 50 %
50 SYRINGE (ML) INTRAVENOUS ONCE
Refills: 0 | Status: COMPLETED | OUTPATIENT
Start: 2023-06-14 | End: 2023-06-14

## 2023-06-14 RX ORDER — ACETAMINOPHEN 500 MG
650 TABLET ORAL EVERY 6 HOURS
Refills: 0 | Status: DISCONTINUED | OUTPATIENT
Start: 2023-06-14 | End: 2023-06-23

## 2023-06-14 RX ADMIN — BUMETANIDE 2 MILLIGRAM(S): 0.25 INJECTION INTRAMUSCULAR; INTRAVENOUS at 19:57

## 2023-06-14 RX ADMIN — Medication 50 MILLILITER(S): at 19:05

## 2023-06-14 NOTE — ED PROCEDURE NOTE - ATTENDING CONTRIBUTION TO CARE
***Sammy Winter MD FACEP*** Attending physician was available for the key components of the procedure, the patient tolerated well. There were no complications with the procedure.

## 2023-06-14 NOTE — H&P ADULT - PROBLEM SELECTOR PLAN 10
C/w atorvastatin Diet: DASH with CC   Dispo: Pending clinical course  DVT: On Eliquis  Code: FULL    PPX given immunosuppresion:   CMV: valgancyclovir 450 daily  Toxo/PCP: bactrim 400-80 POD  CAV: pravastatin 20mg daily, ASA 81mg   Fungal: nystatin

## 2023-06-14 NOTE — ED ADULT NURSE NOTE - NSFALLRISKINTERV_ED_ALL_ED
Assistance OOB with selected safe patient handling equipment if applicable/Assistance with ambulation/Communicate fall risk and risk factors to all staff, patient, and family/Encourage patient to sit up slowly, dangle for a short time, stand at bedside before walking/Monitor gait and stability/Orthostatic vital signs/Provide patient with walking aids/Provide visual cue: yellow wristband, yellow gown, etc/Reinforce activity limits and safety measures with patient and family/Call bell, personal items and telephone in reach/Instruct patient to call for assistance before getting out of bed/chair/stretcher/Non-slip footwear applied when patient is off stretcher/[place] to call system/Physically safe environment - no spills, clutter or unnecessary equipment/Purposeful Proactive Rounding/Room/bathroom lighting operational, light cord in reach

## 2023-06-14 NOTE — H&P ADULT - NSHPREVIEWOFSYSTEMS_GEN_ALL_CORE
REVIEW OF SYSTEMS:  CONSTITUTIONAL: No weakness, fevers or chills  EYES/ENT: No visual changes;  No vertigo or throat pain   NECK: No pain or stiffness  RESPIRATORY: No cough, wheezing, hemoptysis; No shortness of breath  CARDIOVASCULAR: No chest pain or palpitations  GASTROINTESTINAL: No abdominal or epigastric pain. No nausea, vomiting, or hematemesis; No diarrhea or constipation. No melena or hematochezia.  GENITOURINARY: No dysuria, frequency or hematuria  NEUROLOGICAL: No numbness or weakness  SKIN: No itching, rashes REVIEW OF SYSTEMS:  CONSTITUTIONAL: + weakness and shakiness; no fevers or chills  EYES/ENT: No visual changes;  No vertigo or throat pain   NECK: No pain or stiffness  RESPIRATORY: + cough; no wheezing, hemoptysis; No shortness of breath  CARDIOVASCULAR: No chest pain or palpitations  GASTROINTESTINAL: No abdominal or epigastric pain. No nausea, vomiting, or hematemesis; No diarrhea or constipation. No melena or hematochezia.  GENITOURINARY: No dysuria, frequency or hematuria  NEUROLOGICAL: No numbness or weakness  SKIN: No itching, rashes CONSTITUTIONAL: Weakness, tremor. No fever, weight loss  EYES: No eye pain, visual disturbances, or discharge  ENMT:  No difficulty hearing, tinnitus, vertigo; No sinus or throat pain  RESPIRATORY: Cough. No SOB. No wheezing, chills or hemoptysis  CARDIOVASCULAR: No chest pain, palpitations, dizziness, or leg swelling  GASTROINTESTINAL: No abdominal or epigastric pain. No nausea, vomiting, or hematemesis; No diarrhea or constipation. No melena or hematochezia.  GENITOURINARY: No dysuria, frequency, hematuria, or incontinence  NEUROLOGICAL: No headaches, memory loss, loss of strength, numbness, or tremors  SKIN: No itching, burning, rashes, or lesions   LYMPH NODES: No enlarged glands  ENDOCRINE: No heat or cold intolerance; No hair loss  MUSCULOSKELETAL: No joint pain or swelling; No muscle, back pain  PSYCHIATRIC: No depression, anxiety, mood swings, or difficulty sleeping  HEME/LYMPH: No easy bruising, or bleeding gums

## 2023-06-14 NOTE — ED PROVIDER NOTE - PHYSICAL EXAMINATION
GENERAL: NAD  HEENT:  Atraumatic  CHEST/LUNG: Chest rise equal bilaterally  HEART: Regular rate and rhythm  ABDOMEN: Soft, Nontender, Nondistended  EXTREMITIES:  Extremities warm  PSYCH: A&Ox3  SKIN: No obvious rashes or lesions. +2 bilateral pitting edema   NEUROLOGY: strength and sensation intact in all extremities

## 2023-06-14 NOTE — H&P ADULT - PROBLEM SELECTOR PLAN 5
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 Tacrolimus level daily before dose; admission level in specimen received. He is off cellcept chronically while on prednisone for hemolytic anemia  -Continue Bactrim, valganciclovir, nystatin  -C/w aspirin  -Transplant consult in AM

## 2023-06-14 NOTE — H&P ADULT - PROBLEM SELECTOR PLAN 2
Pt with mild pleural effusion  -Satting well on RA, no respiratory distress. CTM Admitted with Cr of 3/19, normal range 1.5-2.1. CKD 4.  -NORMAN likely 2/2 diuresis and poor PO intake, suspect pre-renal FeUrea. F/u urine studies   -Encourage PO intake  -Continue with home bumex 1 daily   -Avoid nephrotoxic agents Admitted with Cr of 3/19, normal range 1.5-2.1. CKD IV.  -NORMAN likely 2/2 diuresis and poor PO intake, suspect pre-renal FeUrea. F/u urine studies   -Encourage PO intake as pt able to tolerate PO  -Continue with home bumex 1 daily   -Avoid nephrotoxic agents

## 2023-06-14 NOTE — H&P ADULT - PROBLEM SELECTOR PLAN 8
Episode of hypoglycemia in ED  c/w Humalog to 34-16-5 and Lantus to 16 U QHS with sliding scale  c/w carb controlled diet/ dash C/w atorvastatin On home pravastatin 20

## 2023-06-14 NOTE — H&P ADULT - PROBLEM SELECTOR PLAN 3
Admitted with Cr of 3/19, normal range ____  -Avoid nephrotoxic agents Admitted with Cr of 3/19, normal range 1.5-2.1. CKD 4.  -NORMAN likely 2/2 diuresis and poor PO intake, suspect pre-renal FeUrea. F/u urine studies   -Avoid nephrotoxic agents Pt with mild pleural effusion  -Satting well on RA, no respiratory distress. CTM

## 2023-06-14 NOTE — ED PROVIDER NOTE - OBJECTIVE STATEMENT
72 y/o male w/ PMH anemia, DVT, cardiac transplant 5 years ago c/o 2 days of SOB that is worse w/ exertion. Pt admits to resolved mild chest pain, otherwise asymptomatic. Denies fevers, chills, nausea, vomiting, dizziness, abdominal pain, dysuria, hematuria.

## 2023-06-14 NOTE — H&P ADULT - NSHPLABSRESULTS_GEN_ALL_CORE
.  LABS:                         8.7    6.33  )-----------( 312      ( 14 Jun 2023 16:13 )             29.3     06-14    138  |  104  |  40<H>  ----------------------------<  36<LL>  4.1   |  21<L>  |  3.19<H>    Ca    8.4      14 Jun 2023 16:13  Phos  3.8     06-14  Mg     1.8     06-14    TPro  5.8<L>  /  Alb  3.1<L>  /  TBili  0.3  /  DBili  x   /  AST  17  /  ALT  12  /  AlkPhos  53  06-14    PT/INR - ( 14 Jun 2023 16:13 )   PT: 18.4 sec;   INR: 1.59 ratio         PTT - ( 14 Jun 2023 16:13 )  PTT:38.3 sec              RADIOLOGY, EKG & ADDITIONAL TESTS: Reviewed. LABS:                       8.7    6.33  )-----------( 312      ( 14 Jun 2023 16:13 )             29.3     06-14    138  |  104  |  40<H>  ----------------------------<  36<LL>  4.1   |  21<L>  |  3.19<H>    Ca    8.4      14 Jun 2023 16:13  Phos  3.8     06-14  Mg     1.8     06-14    TPro  5.8<L>  /  Alb  3.1<L>  /  TBili  0.3  /  DBili  x   /  AST  17  /  ALT  12  /  AlkPhos  53  06-14    PT/INR - ( 14 Jun 2023 16:13 )   PT: 18.4 sec;   INR: 1.59 ratio    PTT - ( 14 Jun 2023 16:13 )  PTT:38.3 sec    IMAGING:  Xray Chest 1 View- PORTABLE-Urgent (06.14.23 @ 17:08)  IMPRESSION:  - Trace right pleural effusion. Chronically elevated right hemidiaphragm with right lower lung atelectasis.  ******PRELIMINARY REPORT******      VA Duplex Lower Ext Vein Scan, Bilat (06.14.23 @ 19:10)  IMPRESSION:  - No evidence of deep venous thrombosis in either lower extremity.    [X] Imaging personally reviewed by me-   [X] ECG personally reviewed by me- 1st degree AV Block, RBBB similar to prior

## 2023-06-14 NOTE — ED ADULT NURSE NOTE - NS ED NURSE DISCH DISPOSITION
There are no preventive care reminders to display for this patient.    Patient is due for the topics as listed above and wishes to proceed with them. Orders placed for Immunization(s) Hep A and HPV  Patient informed to remain in office for 10-15 min following injections           Admitted

## 2023-06-14 NOTE — H&P ADULT - PROBLEM SELECTOR PLAN 6
Continue Tacrolimus  Continue Bactrim, valganciclovir- doses adjusted for creatinine clearance. Post-transplant pAF/AFl  -Continue apixaban, metoprolol  -Currently in SR

## 2023-06-14 NOTE — H&P ADULT - PROBLEM SELECTOR PLAN 1
Pt admitted for tremors and sweats likely 2/2 hypoglycemia iso poor PO intake  -Pt likely needs titration of insulin regimen given decreased diet at home. On home glargine 20 daily; admelog 40 pre-breakfast/14 pre-lunch/4 pre-dinner  -C/w lantus 10 nightly and admelog 10 TID   -Consult endocrine in AM  -DASH diet with CC Pt admitted for tremors and sweats likely 2/2 hypoglycemia iso poor PO intake  -Pt likely needs titration of insulin regimen given decreased diet at home. On home glargine 20 daily; admelog 40 pre-breakfast/14 pre-lunch/4 pre-dinner  -C/w lantus 10 nightly and ISS. Recalculate premeal based on 24 hour ISS   -Consult endocrine in AM  -DASH diet with CC

## 2023-06-14 NOTE — ED ADULT NURSE NOTE - OBJECTIVE STATEMENT
73 year old male pt presented to the ED via ems stating pt with dizziness and shortness of breath with diaphoresis, pt is A&Ox3 slow to respond ambulates with a cane, pt h/o heart transplant 6 yrs ago, states acute onset of sob/dizziness, as per ems fs at home 54, juice given, pt denies any chest pain states feeling better no fever no chills skin cool and moist, abd soft non tender non distended

## 2023-06-14 NOTE — H&P ADULT - PROBLEM SELECTOR PLAN 9
Hold losartan iso NORMAN Diet: DASH with CC   Dispo: Pending clinical course  DVT: On Eliquis  Code: FULL  **Confirm med rec in AM     PPX given immunosuppresion:   CMV: valgancyclovir 450 daily  Toxo/PCP: bactrim 400-80 POD  CAV: pravastatin 20mg daily, ASA 81mg   Fungal: nystatin Pt and emergency contact could not provide full med list on admission. Pt recently discharged from [facility] and notes no changes. Please confirm med rec with pharmacy in AM Pt and emergency contact could not provide full med list on admission. Pt recently discharged from [facility] and reports no changes in medications since discharge. Please confirm med rec with pharmacy in AM

## 2023-06-14 NOTE — H&P ADULT - PROBLEM SELECTOR PLAN 4
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 Followed by heme/onc OP.   -Continue Prednisone 30 mg PO daily  -Daily CBC

## 2023-06-14 NOTE — H&P ADULT - NSHPSOCIALHISTORY_GEN_ALL_CORE
Pt lives with brother and is frequently visited by godbrother Nawaf. Has not used alcohol since 10 years ago given heart transplant. Denies any other substance or tobacco use.

## 2023-06-14 NOTE — ED PROVIDER NOTE - ATTENDING CONTRIBUTION TO CARE
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  Course c/b by URI w/ human metapneumovirus c/f PNA. He was readmitted from  clinic due to leg edema and two mechanical falls.  presents with shortness of breath, most recently admitted for shortness of breath and diuresis  Sammy Winter MD, FACEP: In this physician's medical judgement based on clinical history and physical exam the patient's signs and symptoms lead to differential diagnoses which includes but is not limited to: fluid overload, occult acs, uri,     Historical features, symptoms, and clinical exam not consistent with: sepsis    Labs were ordered and independently reviewed by me.  EKG was ordered and independently reviewed by me.  Imaging was ordered and reviewed by me.      Appropriate medications for the patient's presenting complaints were ordered, and effects were reassessed.     Patient's records including prior hospital visit, med and medical history were reviewed.   History assisted by ems  Escalation to admission/observation was considered.    Will follow up on labs, therapeutics, imaging, reassess and disposition as clinically indicated.  *The above represents an initial assessment/impression. Please refer to my progress notes below for potential changes in patient clinical course*

## 2023-06-14 NOTE — ED PROCEDURE NOTE - PROCEDURE ADDITIONAL DETAILS
18G in right cephalic 18G in right basilic 18G in right basilic  Peripheral IV access in the Emergency Department obtained under dynamic ultrasound guidance with dark nonpulsatile blood return.  Catheter was flushed afterwards without any resistance or resultant extravasation.  IV catheter confirmed in compressible vein after insertion.

## 2023-06-14 NOTE — ED PROVIDER NOTE - CLINICAL SUMMARY MEDICAL DECISION MAKING FREE TEXT BOX
74 y/o male w/ PMH anemia, DVT, cardiac transplant 5 years ago c/o 2 days of SOB that is worse w/ exertion. Pt admits to resolved mild chest pain, otherwise asymptomatic. Denies fevers, chills, nausea, vomiting, dizziness, abdominal pain, dysuria, hematuria. Pt has +2 bilateral pitting edema Will screen for ACS (troponin), anemia/electrolytes, and chest x ray to screen for cardiopulmonary pathology. BNP for heart failure. EKG for heart disease. Likely admission for CHF/cardiac workup.     PCP: Marvin Campbell MD

## 2023-06-14 NOTE — ED ADULT NURSE REASSESSMENT NOTE - NS ED NURSE REASSESS COMMENT FT1
Received report from PRASAD Messina RN. Patient admitted to telemetry in sinus rhythm. AOx4 and speaking coherently. Patient without complaints of dizziness at this time. Only complaints of "being cold" at this time.

## 2023-06-14 NOTE — H&P ADULT - HISTORY OF PRESENT ILLNESS
74 y/o male w/ PMH anemia, DVT, NICM s/p heart transplant 2018 complicated by graft dysfunction p/w 2 days of SOB that is worse w/ exertion. Pt admits to resolved mild chest pain, otherwise asymptomatic. Denies fevers, chills, nausea, vomiting, dizziness, abdominal pain, dysuria, hematuria.     Of note, last admission earlier this month with acute on chronic systolic heart failure with improvement s/p diuresis.     In ED,  72 y/o male w/ PMH anemia, DVT, NICM s/p heart transplant 2018 complicated by graft dysfunction p/w 2 days of SOB that is worse w/ exertion. Pt admits to resolved mild chest pain, otherwise asymptomatic. Denies fevers, chills, nausea, vomiting, dizziness, abdominal pain, dysuria, hematuria.     Of note, last admission earlier this month with acute on chronic systolic heart failure with improvement s/p diuresis.     In ED, pt received bumex 2 mg IV 1x. Had hypoglycemia episode to FS 64.  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 Patient states that since he left the hospital, his sugars have not been high but he has had intermittent low readings. Yesterday, his blood sugars were in the 30s-50s before presentation. He denies fevers, chills, chest pain, SOB, nausea, vomiting, dizziness, abdominal pain, dysuria, hematuria. When asked about difficulty breathing, he relays that he has had a cough but otherwise hasn't had shortness of breath. Pt denies any sick contacts or recent travel. George Mccracken called for collateral - states that he checks in on the patient daily and administers his insulin. He was giving him his prescribed admelog and confirmed low blood sugar readings. He stated he would correct this by giving his brother juice. Notes that the patient has been skipping meals or eating only 3 spoonfuls of meals. He has also not been drinking much fluid for fear of "swelling up" especially in the legs.     Of note, last admission earlier this month with acute on chronic systolic heart failure with improvement s/p diuresis; discharged on bumex. Course c/b steroid-induced hyperglycemia 2/2 prednisone taken for Nicole syndrome.     In ED, pt received bumex 2 mg IV 1x. Had hypoglycemia episode with FS 64.

## 2023-06-14 NOTE — H&P ADULT - NSHPPHYSICALEXAM_GEN_ALL_CORE
.  VITAL SIGNS:  T(C): 36.6 (06-14-23 @ 15:56), Max: 36.6 (06-14-23 @ 15:56)  T(F): 97.9 (06-14-23 @ 15:56), Max: 97.9 (06-14-23 @ 15:56)  HR: 78 (06-14-23 @ 18:01) (76 - 84)  BP: 114/81 (06-14-23 @ 18:01) (91/60 - 114/81)  BP(mean): --  RR: 16 (06-14-23 @ 18:01) (16 - 20)  SpO2: 96% (06-14-23 @ 18:01) (96% - 98%)  Wt(kg): --    PHYSICAL EXAM:    Constitutional: WDWN resting comfortably in bed; NAD  Head: NC/AT  Eyes: PERRL, EOMI, anicteric sclera  ENT: no nasal discharge; uvula midline, no oropharyngeal erythema or exudates; MMM  Neck: supple; no JVD or thyromegaly  Respiratory: CTA B/L; no W/R/R, no retractions  Cardiac: +S1/S2; RRR; no M/R/G; PMI non-displaced  Gastrointestinal: soft, NT/ND; no rebound or guarding; +BSx4  Genitourinary: normal external genitalia  Back: spine midline, no bony tenderness or step-offs; no CVAT B/L  Extremities: WWP, no clubbing or cyanosis; no peripheral edema. Capillary refill <2 sec  Musculoskeletal: NROM x4; no joint swelling, tenderness or erythema  Vascular: 2+ radial, femoral, DP/PT pulses B/L  Dermatologic: skin warm, dry and intact; no rashes, wounds, or scars  Lymphatic: no submandibular or cervical LAD  Neurologic: AAOx3; CNII-XII grossly intact; no focal deficits  Psychiatric: affect and characteristics of appearance, verbalizations, behaviors are appropriate .  VITAL SIGNS:  T(C): 36.6 (06-14-23 @ 15:56), Max: 36.6 (06-14-23 @ 15:56)  T(F): 97.9 (06-14-23 @ 15:56), Max: 97.9 (06-14-23 @ 15:56)  HR: 78 (06-14-23 @ 18:01) (76 - 84)  BP: 114/81 (06-14-23 @ 18:01) (91/60 - 114/81)  BP(mean): --  RR: 16 (06-14-23 @ 18:01) (16 - 20)  SpO2: 96% (06-14-23 @ 18:01) (96% - 98%)  Wt(kg): --    PHYSICAL EXAM:    Constitutional: WDWN resting comfortably in bed; NAD; neck and upper chest with increased girth   Head: NC/AT  Eyes: PERRL, EOMI, anicteric sclera  ENT: no nasal discharge; uvula midline, no oropharyngeal erythema or exudates; MMM  Neck: supple; no JVD or thyromegaly  Respiratory: CTA B/L; no W/R/R, no retractions  Cardiac: +S1/S2; RRR; no M/R/G; PMI non-displaced  Gastrointestinal: soft, NT/ND; no rebound or guarding; +BSx4  Genitourinary: normal external genitalia  Back: spine midline, no bony tenderness or step-offs; no CVAT B/L  Extremities: WWP, no clubbing or cyanosis; trace peripheral edema. Capillary refill <2 sec  Musculoskeletal: NROM x4; no joint swelling, tenderness or erythema  Vascular: 2+ radial, femoral, DP/PT pulses B/L  Dermatologic: skin warm, dry and intact; no rashes, wounds, or scars  Lymphatic: no submandibular or cervical LAD  Neurologic: AAOx3; CNII-XII grossly intact; no focal deficits  Psychiatric: affect and characteristics of appearance, verbalizations, behaviors are appropriate .VITAL SIGNS:  T(C): 36.6 (06-14-23 @ 15:56), Max: 36.6 (06-14-23 @ 15:56)  T(F): 97.9 (06-14-23 @ 15:56), Max: 97.9 (06-14-23 @ 15:56)  HR: 78 (06-14-23 @ 18:01) (76 - 84)  BP: 114/81 (06-14-23 @ 18:01) (91/60 - 114/81)  RR: 16 (06-14-23 @ 18:01) (16 - 20)  SpO2: 96% (06-14-23 @ 18:01) (96% - 98%)    PHYSICAL EXAM:  Constitutional: WDWN resting comfortably in bed; NAD; neck and upper chest with increased girth   Head: NC/AT  Eyes: PERRL, EOMI, anicteric sclera  ENT: no nasal discharge; uvula midline, no oropharyngeal erythema or exudates; MMM  Neck: supple; no JVD or thyromegaly  Respiratory: CTA B/L; no W/R/R, no retractions  Cardiac: +S1/S2; RRR; no M/R/G; PMI non-displaced  Gastrointestinal: soft, NT/ND; no rebound or guarding; +BSx4  Genitourinary: normal external genitalia  Back: spine midline, no bony tenderness or step-offs; no CVAT B/L  Extremities: WWP, no clubbing or cyanosis; trace peripheral edema. Capillary refill <2 sec  Musculoskeletal: NROM x4; no joint swelling, tenderness or erythema  Vascular: 2+ radial, femoral, DP/PT pulses B/L  Dermatologic: skin warm, dry and intact; no rashes, wounds, or scars  Lymphatic: no submandibular or cervical LAD  Neurologic: AAOx3; CNII-XII grossly intact; no focal deficits  Psychiatric: affect and characteristics of appearance, verbalizations, behaviors are appropriate

## 2023-06-14 NOTE — H&P ADULT - ASSESSMENT
72 y/o male w/ PMH anemia, DVT, NICM s/p heart transplant 2018 complicated by graft dysfunction in past p/w 2 days of SOB that is worse w/ exertion. 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

## 2023-06-14 NOTE — H&P ADULT - ATTENDING COMMENTS
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    # Hypoglycemia  - Was on high doses previous admission given steroids  - Home reporting FS as low as 39 this AM  - Lantus 10U qhs for now and sliding scale to reassess insulin needs  - Given recent endo regimen, consider re-consult in AM    # NORMAN on CKD  - C/f pre-renal given decreased PO intake and Bumex use  - f/u Ur studies  - Encourage PO intake    Rest of plan as above

## 2023-06-15 ENCOUNTER — APPOINTMENT (OUTPATIENT)
Dept: RHEUMATOLOGY | Facility: CLINIC | Age: 73
End: 2023-06-15

## 2023-06-15 DIAGNOSIS — Z94.1 HEART TRANSPLANT STATUS: ICD-10-CM

## 2023-06-15 DIAGNOSIS — E16.2 HYPOGLYCEMIA, UNSPECIFIED: ICD-10-CM

## 2023-06-15 DIAGNOSIS — I95.9 HYPOTENSION, UNSPECIFIED: ICD-10-CM

## 2023-06-15 DIAGNOSIS — I10 ESSENTIAL (PRIMARY) HYPERTENSION: ICD-10-CM

## 2023-06-15 DIAGNOSIS — E11.649 TYPE 2 DIABETES MELLITUS WITH HYPOGLYCEMIA WITHOUT COMA: ICD-10-CM

## 2023-06-15 DIAGNOSIS — D58.9 HEREDITARY HEMOLYTIC ANEMIA, UNSPECIFIED: ICD-10-CM

## 2023-06-15 DIAGNOSIS — N17.9 ACUTE KIDNEY FAILURE, UNSPECIFIED: ICD-10-CM

## 2023-06-15 DIAGNOSIS — D84.9 IMMUNODEFICIENCY, UNSPECIFIED: ICD-10-CM

## 2023-06-15 DIAGNOSIS — E78.5 HYPERLIPIDEMIA, UNSPECIFIED: ICD-10-CM

## 2023-06-15 DIAGNOSIS — Z79.2 LONG TERM (CURRENT) USE OF ANTIBIOTICS: ICD-10-CM

## 2023-06-15 DIAGNOSIS — Z79.899 OTHER LONG TERM (CURRENT) DRUG THERAPY: ICD-10-CM

## 2023-06-15 LAB
ALBUMIN SERPL ELPH-MCNC: 3.2 G/DL — LOW (ref 3.3–5)
ALP SERPL-CCNC: 54 U/L — SIGNIFICANT CHANGE UP (ref 40–120)
ALT FLD-CCNC: 14 U/L — SIGNIFICANT CHANGE UP (ref 10–45)
ANION GAP SERPL CALC-SCNC: 13 MMOL/L — SIGNIFICANT CHANGE UP (ref 5–17)
AST SERPL-CCNC: 19 U/L — SIGNIFICANT CHANGE UP (ref 10–40)
BASOPHILS # BLD AUTO: 0.05 K/UL — SIGNIFICANT CHANGE UP (ref 0–0.2)
BASOPHILS NFR BLD AUTO: 0.9 % — SIGNIFICANT CHANGE UP (ref 0–2)
BILIRUB SERPL-MCNC: 0.5 MG/DL — SIGNIFICANT CHANGE UP (ref 0.2–1.2)
BUN SERPL-MCNC: 41 MG/DL — HIGH (ref 7–23)
CALCIUM SERPL-MCNC: 8.5 MG/DL — SIGNIFICANT CHANGE UP (ref 8.4–10.5)
CHLORIDE SERPL-SCNC: 102 MMOL/L — SIGNIFICANT CHANGE UP (ref 96–108)
CHOLEST SERPL-MCNC: 112 MG/DL — SIGNIFICANT CHANGE UP
CK MB CFR SERPL CALC: 2.4 NG/ML — SIGNIFICANT CHANGE UP (ref 0–6.7)
CO2 SERPL-SCNC: 22 MMOL/L — SIGNIFICANT CHANGE UP (ref 22–31)
CREAT ?TM UR-MCNC: 56 MG/DL — SIGNIFICANT CHANGE UP
CREAT SERPL-MCNC: 3.2 MG/DL — HIGH (ref 0.5–1.3)
EGFR: 20 ML/MIN/1.73M2 — LOW
EOSINOPHIL # BLD AUTO: 0 K/UL — SIGNIFICANT CHANGE UP (ref 0–0.5)
EOSINOPHIL NFR BLD AUTO: 0 % — SIGNIFICANT CHANGE UP (ref 0–6)
GIANT PLATELETS BLD QL SMEAR: PRESENT — SIGNIFICANT CHANGE UP
GLUCOSE BLDC GLUCOMTR-MCNC: 165 MG/DL — HIGH (ref 70–99)
GLUCOSE BLDC GLUCOMTR-MCNC: 172 MG/DL — HIGH (ref 70–99)
GLUCOSE BLDC GLUCOMTR-MCNC: 237 MG/DL — HIGH (ref 70–99)
GLUCOSE BLDC GLUCOMTR-MCNC: 80 MG/DL — SIGNIFICANT CHANGE UP (ref 70–99)
GLUCOSE SERPL-MCNC: 88 MG/DL — SIGNIFICANT CHANGE UP (ref 70–99)
HCT VFR BLD CALC: 31.1 % — LOW (ref 39–50)
HDLC SERPL-MCNC: 45 MG/DL — SIGNIFICANT CHANGE UP
HGB BLD-MCNC: 9.2 G/DL — LOW (ref 13–17)
LIPID PNL WITH DIRECT LDL SERPL: 41 MG/DL — SIGNIFICANT CHANGE UP
LYMPHOCYTES # BLD AUTO: 0.18 K/UL — LOW (ref 1–3.3)
LYMPHOCYTES # BLD AUTO: 3.5 % — LOW (ref 13–44)
MAGNESIUM SERPL-MCNC: 1.7 MG/DL — SIGNIFICANT CHANGE UP (ref 1.6–2.6)
MANUAL SMEAR VERIFICATION: SIGNIFICANT CHANGE UP
MCHC RBC-ENTMCNC: 29.6 GM/DL — LOW (ref 32–36)
MCHC RBC-ENTMCNC: 30 PG — SIGNIFICANT CHANGE UP (ref 27–34)
MCV RBC AUTO: 101.3 FL — HIGH (ref 80–100)
MONOCYTES # BLD AUTO: 0.32 K/UL — SIGNIFICANT CHANGE UP (ref 0–0.9)
MONOCYTES NFR BLD AUTO: 6.2 % — SIGNIFICANT CHANGE UP (ref 2–14)
NEUTROPHILS # BLD AUTO: 4.16 K/UL — SIGNIFICANT CHANGE UP (ref 1.8–7.4)
NEUTROPHILS NFR BLD AUTO: 79.7 % — HIGH (ref 43–77)
NON HDL CHOLESTEROL: 67 MG/DL — SIGNIFICANT CHANGE UP
NRBC # BLD: 2 /100 — HIGH (ref 0–0)
OSMOLALITY UR: 308 MOS/KG — SIGNIFICANT CHANGE UP (ref 300–900)
PHOSPHATE SERPL-MCNC: 3.6 MG/DL — SIGNIFICANT CHANGE UP (ref 2.5–4.5)
PLAT MORPH BLD: NORMAL — SIGNIFICANT CHANGE UP
PLATELET # BLD AUTO: 301 K/UL — SIGNIFICANT CHANGE UP (ref 150–400)
POTASSIUM SERPL-MCNC: 4.8 MMOL/L — SIGNIFICANT CHANGE UP (ref 3.5–5.3)
POTASSIUM SERPL-SCNC: 4.8 MMOL/L — SIGNIFICANT CHANGE UP (ref 3.5–5.3)
POTASSIUM UR-SCNC: 14 MMOL/L — SIGNIFICANT CHANGE UP
PROT ?TM UR-MCNC: <7 MG/DL — SIGNIFICANT CHANGE UP (ref 0–12)
PROT SERPL-MCNC: 6 G/DL — SIGNIFICANT CHANGE UP (ref 6–8.3)
PROT/CREAT UR-RTO: <0.1 RATIO — SIGNIFICANT CHANGE UP (ref 0–0.2)
RBC # BLD: 3.07 M/UL — LOW (ref 4.2–5.8)
RBC # FLD: 16.9 % — HIGH (ref 10.3–14.5)
RBC BLD AUTO: SIGNIFICANT CHANGE UP
SODIUM SERPL-SCNC: 137 MMOL/L — SIGNIFICANT CHANGE UP (ref 135–145)
SODIUM UR-SCNC: 97 MMOL/L — SIGNIFICANT CHANGE UP
TACROLIMUS SERPL-MCNC: 10.8 NG/ML — SIGNIFICANT CHANGE UP
TACROLIMUS SERPL-MCNC: 14.6 NG/ML — SIGNIFICANT CHANGE UP
TRIGL SERPL-MCNC: 128 MG/DL — SIGNIFICANT CHANGE UP
TROPONIN T, HIGH SENSITIVITY RESULT: 68 NG/L — HIGH (ref 0–51)
UUN UR-MCNC: 216 MG/DL — SIGNIFICANT CHANGE UP
VARIANT LYMPHS # BLD: 9.7 % — HIGH (ref 0–6)
WBC # BLD: 5.22 K/UL — SIGNIFICANT CHANGE UP (ref 3.8–10.5)
WBC # FLD AUTO: 5.22 K/UL — SIGNIFICANT CHANGE UP (ref 3.8–10.5)

## 2023-06-15 PROCEDURE — 76770 US EXAM ABDO BACK WALL COMP: CPT | Mod: 26

## 2023-06-15 PROCEDURE — 99233 SBSQ HOSP IP/OBS HIGH 50: CPT

## 2023-06-15 PROCEDURE — 76937 US GUIDE VASCULAR ACCESS: CPT | Mod: 26

## 2023-06-15 PROCEDURE — 99223 1ST HOSP IP/OBS HIGH 75: CPT

## 2023-06-15 PROCEDURE — 99222 1ST HOSP IP/OBS MODERATE 55: CPT

## 2023-06-15 PROCEDURE — 99233 SBSQ HOSP IP/OBS HIGH 50: CPT | Mod: GC

## 2023-06-15 RX ORDER — SODIUM CHLORIDE 9 MG/ML
1000 INJECTION, SOLUTION INTRAVENOUS
Refills: 0 | Status: DISCONTINUED | OUTPATIENT
Start: 2023-06-15 | End: 2023-06-23

## 2023-06-15 RX ORDER — INSULIN GLARGINE 100 [IU]/ML
10 INJECTION, SOLUTION SUBCUTANEOUS ONCE
Refills: 0 | Status: COMPLETED | OUTPATIENT
Start: 2023-06-15 | End: 2023-06-15

## 2023-06-15 RX ORDER — METOPROLOL TARTRATE 50 MG
200 TABLET ORAL DAILY
Refills: 0 | Status: DISCONTINUED | OUTPATIENT
Start: 2023-06-15 | End: 2023-06-15

## 2023-06-15 RX ORDER — INSULIN LISPRO 100/ML
VIAL (ML) SUBCUTANEOUS
Refills: 0 | Status: DISCONTINUED | OUTPATIENT
Start: 2023-06-15 | End: 2023-06-23

## 2023-06-15 RX ORDER — INSULIN LISPRO 100/ML
VIAL (ML) SUBCUTANEOUS AT BEDTIME
Refills: 0 | Status: DISCONTINUED | OUTPATIENT
Start: 2023-06-15 | End: 2023-06-16

## 2023-06-15 RX ORDER — DEXTROSE 50 % IN WATER 50 %
25 SYRINGE (ML) INTRAVENOUS ONCE
Refills: 0 | Status: DISCONTINUED | OUTPATIENT
Start: 2023-06-15 | End: 2023-06-23

## 2023-06-15 RX ORDER — BUMETANIDE 0.25 MG/ML
1 INJECTION INTRAMUSCULAR; INTRAVENOUS DAILY
Refills: 0 | Status: DISCONTINUED | OUTPATIENT
Start: 2023-06-15 | End: 2023-06-15

## 2023-06-15 RX ORDER — DEXTROSE 50 % IN WATER 50 %
15 SYRINGE (ML) INTRAVENOUS ONCE
Refills: 0 | Status: DISCONTINUED | OUTPATIENT
Start: 2023-06-15 | End: 2023-06-23

## 2023-06-15 RX ORDER — TACROLIMUS 5 MG/1
1.5 CAPSULE ORAL
Refills: 0 | Status: DISCONTINUED | OUTPATIENT
Start: 2023-06-15 | End: 2023-06-16

## 2023-06-15 RX ORDER — VALGANCICLOVIR 450 MG/1
450 TABLET, FILM COATED ORAL DAILY
Refills: 0 | Status: DISCONTINUED | OUTPATIENT
Start: 2023-06-15 | End: 2023-06-16

## 2023-06-15 RX ORDER — CHLORHEXIDINE GLUCONATE 213 G/1000ML
1 SOLUTION TOPICAL
Refills: 0 | Status: DISCONTINUED | OUTPATIENT
Start: 2023-06-15 | End: 2023-06-23

## 2023-06-15 RX ORDER — PATIROMER 8.4 G/1
16.8 POWDER, FOR SUSPENSION ORAL
Qty: 0 | Refills: 0 | DISCHARGE

## 2023-06-15 RX ORDER — METOPROLOL TARTRATE 50 MG
100 TABLET ORAL DAILY
Refills: 0 | Status: DISCONTINUED | OUTPATIENT
Start: 2023-06-16 | End: 2023-06-23

## 2023-06-15 RX ORDER — APIXABAN 2.5 MG/1
5 TABLET, FILM COATED ORAL EVERY 12 HOURS
Refills: 0 | Status: DISCONTINUED | OUTPATIENT
Start: 2023-06-15 | End: 2023-06-23

## 2023-06-15 RX ORDER — GLUCAGON INJECTION, SOLUTION 0.5 MG/.1ML
1 INJECTION, SOLUTION SUBCUTANEOUS ONCE
Refills: 0 | Status: DISCONTINUED | OUTPATIENT
Start: 2023-06-15 | End: 2023-06-23

## 2023-06-15 RX ORDER — NYSTATIN 500MM UNIT
500000 POWDER (EA) MISCELLANEOUS THREE TIMES A DAY
Refills: 0 | Status: DISCONTINUED | OUTPATIENT
Start: 2023-06-15 | End: 2023-06-23

## 2023-06-15 RX ORDER — DEXTROSE 50 % IN WATER 50 %
12.5 SYRINGE (ML) INTRAVENOUS ONCE
Refills: 0 | Status: DISCONTINUED | OUTPATIENT
Start: 2023-06-15 | End: 2023-06-23

## 2023-06-15 RX ORDER — FOLIC ACID 0.8 MG
1 TABLET ORAL DAILY
Refills: 0 | Status: DISCONTINUED | OUTPATIENT
Start: 2023-06-15 | End: 2023-06-23

## 2023-06-15 RX ORDER — TACROLIMUS 5 MG/1
2 CAPSULE ORAL
Refills: 0 | Status: DISCONTINUED | OUTPATIENT
Start: 2023-06-15 | End: 2023-06-15

## 2023-06-15 RX ORDER — SENNA PLUS 8.6 MG/1
2 TABLET ORAL AT BEDTIME
Refills: 0 | Status: DISCONTINUED | OUTPATIENT
Start: 2023-06-15 | End: 2023-06-23

## 2023-06-15 RX ORDER — INSULIN GLARGINE 100 [IU]/ML
10 INJECTION, SOLUTION SUBCUTANEOUS AT BEDTIME
Refills: 0 | Status: DISCONTINUED | OUTPATIENT
Start: 2023-06-15 | End: 2023-06-15

## 2023-06-15 RX ORDER — ASPIRIN/CALCIUM CARB/MAGNESIUM 324 MG
81 TABLET ORAL DAILY
Refills: 0 | Status: DISCONTINUED | OUTPATIENT
Start: 2023-06-15 | End: 2023-06-23

## 2023-06-15 RX ORDER — INSULIN GLARGINE 100 [IU]/ML
10 INJECTION, SOLUTION SUBCUTANEOUS EVERY MORNING
Refills: 0 | Status: DISCONTINUED | OUTPATIENT
Start: 2023-06-16 | End: 2023-06-16

## 2023-06-15 RX ADMIN — Medication 1 TABLET(S): at 05:23

## 2023-06-15 RX ADMIN — Medication 500000 UNIT(S): at 15:47

## 2023-06-15 RX ADMIN — Medication 200 MILLIGRAM(S): at 05:25

## 2023-06-15 RX ADMIN — TACROLIMUS 2 MILLIGRAM(S): 5 CAPSULE ORAL at 05:25

## 2023-06-15 RX ADMIN — VALGANCICLOVIR 450 MILLIGRAM(S): 450 TABLET, FILM COATED ORAL at 12:06

## 2023-06-15 RX ADMIN — Medication 1: at 08:13

## 2023-06-15 RX ADMIN — INSULIN GLARGINE 10 UNIT(S): 100 INJECTION, SOLUTION SUBCUTANEOUS at 22:06

## 2023-06-15 RX ADMIN — APIXABAN 5 MILLIGRAM(S): 2.5 TABLET, FILM COATED ORAL at 17:06

## 2023-06-15 RX ADMIN — APIXABAN 5 MILLIGRAM(S): 2.5 TABLET, FILM COATED ORAL at 05:25

## 2023-06-15 RX ADMIN — Medication 500000 UNIT(S): at 22:06

## 2023-06-15 RX ADMIN — TACROLIMUS 1.5 MILLIGRAM(S): 5 CAPSULE ORAL at 17:06

## 2023-06-15 RX ADMIN — Medication 1 MILLIGRAM(S): at 12:05

## 2023-06-15 RX ADMIN — Medication 500000 UNIT(S): at 05:23

## 2023-06-15 RX ADMIN — BUMETANIDE 1 MILLIGRAM(S): 0.25 INJECTION INTRAMUSCULAR; INTRAVENOUS at 05:24

## 2023-06-15 RX ADMIN — Medication 30 MILLIGRAM(S): at 05:24

## 2023-06-15 RX ADMIN — SENNA PLUS 2 TABLET(S): 8.6 TABLET ORAL at 22:03

## 2023-06-15 RX ADMIN — Medication 81 MILLIGRAM(S): at 12:05

## 2023-06-15 RX ADMIN — Medication 1: at 12:06

## 2023-06-15 RX ADMIN — Medication 2: at 16:35

## 2023-06-15 NOTE — PROGRESS NOTE ADULT - ASSESSMENT
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

## 2023-06-15 NOTE — PHYSICAL THERAPY INITIAL EVALUATION ADULT - ADDITIONAL COMMENTS
Pt resides in a pvt home w/ brother, 3 steps to enter (+HR). Patient required assistance with ADLs and mobility. Owns a RW & cane. Has home health aids 7 days per week for 8 hours per day.

## 2023-06-15 NOTE — PROGRESS NOTE ADULT - PROBLEM SELECTOR PLAN 10
Diet: DASH with CC   Dispo: Pending clinical course  DVT: On Eliquis  Code: FULL    PPX given immunosuppresion:   CMV: valgancyclovir 450 daily  Toxo/PCP: bactrim 400-80 POD  CAV: pravastatin 20mg daily, ASA 81mg   Fungal: nystatin

## 2023-06-15 NOTE — PROGRESS NOTE ADULT - PROBLEM SELECTOR PLAN 2
Admitted with Cr of 3/19, normal range 1.5-2.1. CKD IV.  -NORMAN likely 2/2 diuresis and poor PO intake, suspect pre-renal FeUrea. F/u urine studies   -Encourage PO intake as pt able to tolerate PO  -Continue with home bumex 1 daily   -Avoid nephrotoxic agents Admitted with Cr of 3/19, normal range 1.5-2.1. CKD IV.  -NORMAN likely 2/2 diuresis and poor PO intake, suspect pre-renal FeUrea. F/u urine studies   -Encourage PO intake as pt able to tolerate PO  - FeUrea 330.1 --> indicate of pre-renal disease  -hold home bumex 1 daily pending cards  -Avoid nephrotoxic agents  - cardiorenal and transplant cards consulted Admitted with Cr of 3/19, normal range 1.5-2.1. CKD IV.  - NORMAN likely 2/2 diuresis and poor PO intake, suspect pre-renal FeUrea. F/u urine studies   - Encourage PO intake as pt able to tolerate PO  - FeUrea 30.1% --> indicate of pre-renal disease  - f/u renal US  - hold home bumex 1 daily pending cards/nephro  - Avoid nephrotoxic agents  - cardiorenal and transplant cards consulted

## 2023-06-15 NOTE — PHARMACOTHERAPY INTERVENTION NOTE - COMMENTS
Performed medication reconciliation and home medication list updated in prescription writer/ outpatient medication review. Medications verified with patients pharmacies [facility], and [facility] discharge from 5/23 to 6/2. Patient is not sure of his medications, states he gets his meds blister packs and he just takes "whatever is in the box". Patient would benefit from medication list at discharge and medication counseling.     Home Medications:  Admelog 100 units/mL injectable solution: 3 injectable once a day before breakfast   Admelog 100 units/mL injectable solution: 14 unit(s) injectable once a day (with lunch)   Admelog 100 units/mL injectable solution: 4 unit(s) injectable once a day  apixaban 5 mg oral tablet: 1 tab(s) orally every 12 hours   aspirin 81 mg oral tablet: 1 tab(s) orally once a day   febuxostat 40 mg oral tablet: 1 tab(s) orally once a day  insulin glargine 100 units/mL subcutaneous solution: 20 unit(s) subcutaneous once a day  metoprolol succinate 200 mg oral tablet, extended release: 1 tab(s) orally once a day   nystatin 100,000 units/mL oral suspension: 5 milliliter(s) orally 3 times a day   ocular lubricant eye drops, 1 drop in each affected eye four times a day  pantoprazole 40 mg oral delayed release tablet: 1 tab(s) orally once a day (before a meal)  pravastatin 20 mg oral tablet: 1 tab(s) orally once a day   prednisone 30mg once a day  sulfamethoxazole-trimethoprim 400 mg-80 mg oral tablet: 1 tab(s) orally every 24 hours   valGANciclovir 450 mg oral tablet: 1 tab(s) orally once a day   Bumex 1mg orally once a day  tacrolimus 2mg twice a day    Recommendations: Communicated with T8 on discrepancies.    Noelle Fonseca, PharmD, BCPS  Clinical Pharmacy Specialist  Teams (preferred) or [phone]

## 2023-06-15 NOTE — CONSULT NOTE ADULT - PROBLEM SELECTOR RECOMMENDATION 7
-continue home prednisone 30 mg PO QD, per heme no role for rituximab at this time  -he will follow up with Dr. Rosario outpatient.

## 2023-06-15 NOTE — PROGRESS NOTE ADULT - ATTENDING COMMENTS
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     Hypoglycemia now improved. Borderline hypotensive, orthostatic- hold diuretics, fluid challenge after d/w HF team.

## 2023-06-15 NOTE — PROGRESS NOTE ADULT - PROBLEM SELECTOR PLAN 9
Pt and emergency contact could not provide full med list on admission. Pt recently discharged from [facility] and reports no changes in medications since discharge. Please confirm med rec with pharmacy in AM Diet: DASH with CC   Dispo: Pending clinical course, PT consulted  DVT: On Eliquis  Code: FULL    PPX given immunosuppresion:   CMV: valgancyclovir 450 daily  Toxo/PCP: bactrim 400-80 POD  CAV: pravastatin 20mg daily, ASA 81mg   Fungal: nystatin

## 2023-06-15 NOTE — CONSULT NOTE ADULT - PROBLEM SELECTOR RECOMMENDATION 9
Pt. with NORMAN on CKD suspected in the setting of hemodynamics along with elevated tacrolimus levels. On review of [facility] pt noted to have a baseline SCr ranging between 1.8 -2 with most recent SCr from his previous hospitalization on 6/2 of 2.19, on admission it was elevated to 3.19 and today remains elevated at 3.2. Likely with ATN. Pt noted to have SBP in the 90's on admission along with supra-therapeutic tacrolimus level of 14.6 on 6/14. UA bland and urine lytes reviewed. Please check renal sonogram. Please monitor for urinary retention with serial bladder scans. Would continue to hold diuretics for now; no need for IVF at this time. Monitor labs and urine output. Avoid nephrotoxins. Dose medications as per eGFR.    If any questions, please feel free to contact me     Yaya Walters  Nephrology Fellow  [facility] Pager: [phone] Pt. with NORMAN on CKD suspected in the setting of hemodynamics along with elevated tacrolimus levels. On review of [facility] pt noted to have a baseline SCr ranging between 1.8 -2 with most recent SCr from his previous hospitalization on 6/2 of 2.19, on admission it was elevated to 3.19 and today remains elevated at 3.2. Likely with ATN. Pt noted to have SBP in the 90's on admission along with supra-therapeutic tacrolimus level of 14.6 on 6/14. UA bland and urine lytes reviewed. Please check renal sonogram.     Please monitor for urinary retention with serial bladder scans. Would continue to hold diuretics for now; no need for IVF at this time. Monitor labs and urine output. Avoid nephrotoxins. Dose medications as per eGFR.    If any questions, please feel free to contact me     Yaya Walters  Nephrology Fellow  [facility] Pager: [phone]

## 2023-06-15 NOTE — CONSULT NOTE ADULT - PROBLEM SELECTOR RECOMMENDATION 3
On pravastatin 20mg   Continue as per cardiology        Aren LEFTY Scruggs  [phone]
-5 years out, transplant on 2/23/2018, from HM2 VAD due to pump thrombosis  -stable coronary cameral fistula  -see immunosuppression below  -family meeting held 5/31  - will need to arrange for home PT.

## 2023-06-15 NOTE — CONSULT NOTE ADULT - PROBLEM SELECTOR RECOMMENDATION 2
Anti-hypertensives held due to low BP and NORMAN  Continue to monitor
- this afternoon noted to be hypotension with SBP sitting the 80s and is symptomatic  - reduce Toprol  mg PO QD   - remains off losartan since last admission  - encourage to increase PO intake  - please avoid giving IV fluids  - due to his history of infections, please send blood cultures today  - plan to obtain formal ECHO today

## 2023-06-15 NOTE — CONSULT NOTE ADULT - ASSESSMENT
73 year old male, with past history of HTN, HLD and T2DM on insulin, complicated by steroid-induced hyperglycemia, presents with symptomatic hypoglycemia in the setting of reduced oral intake.  Endocrine consulted for hypoglycemia (high risk patient with severe hypoglycemia at high risk of CAD and CVA with high level decision-making). 
Pt. with NORMAN on CKD
73M with hx of nonischemic cardiomyopathy s/p HM2 LVAD in June 2017 complicated possible pump thrombosis who underwent OHT 2/23/18 with hepatitis C donor, hx of hemolytic anemia (treated with prednisone and Rituximab), LAD-RV fistula (Unable to be closed) was recently admitted to [facility] (5/25-6/2) volume overloaded now presents after being found confused/alter at home. Upon arrival in the ER he was found to have FS in the 30s-50s and labs revealed worsening NORMAN with creatinine up to 3.2. Since being admitted he has been hypotensive with SBP sitting in the 80s and is asymptomiatic. Given his history he is currently undergo infectious workup. He is also noted to be volume overloaded with pitting edema and elevated JVP.

## 2023-06-15 NOTE — PROGRESS NOTE ADULT - PROBLEM SELECTOR PLAN 4
Bicytopenia (anemia and leukopenia) 2/2 Nicole syndrome. Followed by heme/onc OP.   -Continue Prednisone 30 mg PO daily  -Daily CBC

## 2023-06-15 NOTE — CONSULT NOTE ADULT - PROBLEM SELECTOR RECOMMENDATION 9
- readmitted with FS as low at 30-50, now improved  - endo consulted, appreciate recommendations  - currently on lantus 10 units

## 2023-06-15 NOTE — PROGRESS NOTE ADULT - PROBLEM SELECTOR PLAN 1
Pt admitted for tremors and sweats likely 2/2 hypoglycemia iso poor PO intake  -Pt likely needs titration of insulin regimen given decreased diet at home. On home glargine 20 daily; admelog 40 pre-breakfast/14 pre-lunch/4 pre-dinner  -C/w lantus 10 nightly and ISS. Recalculate premeal based on 24 hour ISS   -Consult endocrine in AM  -DASH diet with CC Pt admitted for tremors and sweats likely 2/2 hypoglycemia iso poor PO intake  -Pt likely needs titration of insulin regimen given decreased diet at home.   - Reportedly on home glargine 20 daily; admelog 40 pre-breakfast/14 pre-lunch/4 pre-dinner --> per pharm team and records, on admelog 3 pre breakfast  -C/w lantus 10 nightly and ISS. Recalculate premeal based on 24 hour ISS   -DASH diet with CC  - endocrine consulted, will appreciate recs

## 2023-06-15 NOTE — CONSULT NOTE ADULT - PROBLEM SELECTOR RECOMMENDATION 9
Home regiment: lantus 20 units in the morning 9am, admelog 30/14/4   Given severe hypoglycemia and reduced oral intake, insulin doses need to be reduced   Reduce Lantus to 10 units in the morning (give now as STAT and rechart standing order from tomorrow morning 9am). CANCEL evening order   Continue moderate dose correction scale pre meals and low dose correction scale prebed (HOLD if NPO/skips meal)  Will consider adding regular admelog pre meals we get better sense of patient's oral intake and insulin requirements over next 24hrs  Monitor BGs pre meals and prebed   Hypoglycemia protocol   Carb consistent diet   Please notify endocrine team if prednisone doses to be tapered    Will likely discharge on basal bolus (lantus and humolog doses TBD closer to discharge)  Suggested to God brother for patient to take oral medications when the home aid is present or when he is present to ensure appropriate administration  Has follow up outpatient appointment with Endocrine Dr. Clemens Aug 15th [address] [place]

## 2023-06-15 NOTE — PROGRESS NOTE ADULT - SUBJECTIVE AND OBJECTIVE BOX
***************************************************************  Lio Talley, PGY1  Internal Medicine   TEAMS Preferred  ***************************************************************    BILLY RUSSELL  73y  MRN: [id number]  23 (1d)    Patient is a 73y old  Male who presents with a chief complaint of Hypoglycemia (2023 20:00)      SUBJECTIVE / OVERNIGHT EVENTS:   No acute overnight events. Pt seen and examined at bedside. Denies fevers, chills, CP, SOB, Abdominal pain, N/V, Constipation, Diarrhea. Last BM     12 Point ROS negative with the exception of the above    MEDICATIONS  (STANDING):  apixaban 5 milliGRAM(s) Oral every 12 hours  aspirin  chewable 81 milliGRAM(s) Oral daily  buMETAnide 1 milliGRAM(s) Oral daily  dextrose 5%. 1000 milliLiter(s) (100 mL/Hr) IV Continuous <Continuous>  dextrose 5%. 1000 milliLiter(s) (50 mL/Hr) IV Continuous <Continuous>  dextrose 50% Injectable 25 Gram(s) IV Push once  dextrose 50% Injectable 25 Gram(s) IV Push once  dextrose 50% Injectable 12.5 Gram(s) IV Push once  folic acid 1 milliGRAM(s) Oral daily  glucagon  Injectable 1 milliGRAM(s) IntraMuscular once  insulin glargine Injectable (LANTUS) 10 Unit(s) SubCutaneous at bedtime  insulin lispro (ADMELOG) corrective regimen sliding scale   SubCutaneous three times a day before meals  insulin lispro (ADMELOG) corrective regimen sliding scale   SubCutaneous at bedtime  metoprolol succinate  milliGRAM(s) Oral daily  nystatin    Suspension 713666 Unit(s) Oral three times a day  predniSONE   Tablet 30 milliGRAM(s) Oral daily  senna 2 Tablet(s) Oral at bedtime  tacrolimus 2 milliGRAM(s) Oral two times a day  trimethoprim   80 mG/sulfamethoxazole 400 mG 1 Tablet(s) Oral every 24 hours  valGANciclovir 450 milliGRAM(s) Oral daily    MEDICATIONS  (PRN):  acetaminophen     Tablet .. 650 milliGRAM(s) Oral every 6 hours PRN Temp greater or equal to 38C (100.4F), Mild Pain (1 - 3)  dextrose Oral Gel 15 Gram(s) Oral once PRN Blood Glucose LESS THAN 70 milliGRAM(s)/deciliter      OBJECTIVE:  Vital Signs Last 24 Hrs  T(C): 36.6 (15 Kennedy 2023 04:12), Max: 36.6 (2023 15:56)  T(F): 97.8 (15 Kennedy 2023 04:12), Max: 97.9 (2023 15:56)  HR: 93 (15 Kennedy 2023 04:12) (76 - 93)  BP: 102/63 (15 Kennedy 2023 04:12) (90/76 - 114/81)  BP(mean): 80 (2023 22:28) (80 - 80)  RR: 18 (15 Kennedy 2023 04:12) (16 - 26)  SpO2: 96% (15 Kennedy 2023 04:12) (95% - 100%)    Parameters below as of 15 Kennedy 2023 04:12  Patient On (Oxygen Delivery Method): room air        I&O's Summary    2023 07:01  -  15 Kennedy 2023 06:59  --------------------------------------------------------  IN: 0 mL / OUT: 300 mL / NET: -300 mL        PHYSICAL EXAM:  GENERAL: Laying comfortably, NAD  HEENT: NCAT, PERRLA, EOMI, no scleral icterus, no LAD  NECK: No JVD, supple  LUNG: CTABL; No wheezes, crackles, or rhonchi  HEART: RRR; normal S1/S2; No murmurs, rubs, or gallops  ABDOMEN: +BS, soft, nontender, nondistended, no HSM; No rebound, guarding, or rigidity  EXTREMITIES:  No LE edema b/l, 2+ Peripheral Pulses, No clubbing or cyanosis  NEUROLOGY: AOx3, non-focal, strength 5/5 in all extremities, sensation intact  PSYCH: calm and cooperative  SKIN: No rashes or lesions    LABS:                        9.2    5.22  )-----------( 301      ( 15 Kennedy 2023 06:06 )             31.1     Auto Eosinophil # x     / Auto Eosinophil % x     / Auto Neutrophil # x     / Auto Neutrophil % x     / BANDS % x                            8.7    6.33  )-----------( 312      ( 2023 16:13 )             29.3     Auto Eosinophil # 0.01  / Auto Eosinophil % 0.2   / Auto Neutrophil # 4.19  / Auto Neutrophil % 66.1  / BANDS % x        06-15    137  |  102  |  41<H>  ----------------------------<  88  4.8   |  22  |  3.20<H>  06-14    138  |  104  |  40<H>  ----------------------------<  36<LL>  4.1   |  21<L>  |  3.19<H>    Ca    8.5      15 Kennedy 2023 06:05  Ca    8.4      2023 16:13  Phos  3.6     06-15  Mg     1.7     06-15    TPro  6.0  /  Alb  3.2<L>  /  TBili  0.5  /  DBili  x   /  AST  19  /  ALT  14  /  AlkPhos  54  06-15  TPro  5.8<L>  /  Alb  3.1<L>  /  TBili  0.3  /  DBili  x   /  AST  17  /  ALT  12  /  AlkPhos  53  06-14    PT/INR - ( 2023 16:13 )   PT: 18.4 sec;   INR: 1.59 ratio         PTT - ( 2023 16:13 )  PTT:38.3 sec  CARDIAC MARKERS ( 15 Kennedy 2023 06:06 )  x     / x     / x     / x     / 2.4 ng/mL      Urinalysis Basic - ( 2023 20:23 )    Color: Light Yellow / Appearance: Clear / S.013 / pH: x  Gluc: x / Ketone: Negative  / Bili: Negative / Urobili: Negative   Blood: x / Protein: Negative / Nitrite: Negative   Leuk Esterase: Negative / RBC: x / WBC x   Sq Epi: x / Non Sq Epi: x / Bacteria: x          CAPILLARY BLOOD GLUCOSE      POCT Blood Glucose.: 87 mg/dL (2023 19:11)  POCT Blood Glucose.: 79 mg/dL (2023 16:40)        RADIOLOGY & ADDITIONAL TESTS:     ***************************************************************  Lio Talley, PGY1  Internal Medicine   TEAMS Preferred  ***************************************************************    BILLY RUSSELL  73y  MRN: [id number]  23 (1d)    Patient is a 73y old  Male who presents with a chief complaint of Hypoglycemia (2023 20:00)      SUBJECTIVE / OVERNIGHT EVENTS:   Admitted overnight.  Pt seen and examined at bedside. Reports weakness when rising from a seated position and continued hand tremors. Otherwise feels improved. Denies fevers, chills, nausea, vomiting.     12 Point ROS negative with the exception of the above    MEDICATIONS  (STANDING):  apixaban 5 milliGRAM(s) Oral every 12 hours  aspirin  chewable 81 milliGRAM(s) Oral daily  buMETAnide 1 milliGRAM(s) Oral daily  dextrose 5%. 1000 milliLiter(s) (100 mL/Hr) IV Continuous <Continuous>  dextrose 5%. 1000 milliLiter(s) (50 mL/Hr) IV Continuous <Continuous>  dextrose 50% Injectable 25 Gram(s) IV Push once  dextrose 50% Injectable 25 Gram(s) IV Push once  dextrose 50% Injectable 12.5 Gram(s) IV Push once  folic acid 1 milliGRAM(s) Oral daily  glucagon  Injectable 1 milliGRAM(s) IntraMuscular once  insulin glargine Injectable (LANTUS) 10 Unit(s) SubCutaneous at bedtime  insulin lispro (ADMELOG) corrective regimen sliding scale   SubCutaneous three times a day before meals  insulin lispro (ADMELOG) corrective regimen sliding scale   SubCutaneous at bedtime  metoprolol succinate  milliGRAM(s) Oral daily  nystatin    Suspension 165245 Unit(s) Oral three times a day  predniSONE   Tablet 30 milliGRAM(s) Oral daily  senna 2 Tablet(s) Oral at bedtime  tacrolimus 2 milliGRAM(s) Oral two times a day  trimethoprim   80 mG/sulfamethoxazole 400 mG 1 Tablet(s) Oral every 24 hours  valGANciclovir 450 milliGRAM(s) Oral daily    MEDICATIONS  (PRN):  acetaminophen     Tablet .. 650 milliGRAM(s) Oral every 6 hours PRN Temp greater or equal to 38C (100.4F), Mild Pain (1 - 3)  dextrose Oral Gel 15 Gram(s) Oral once PRN Blood Glucose LESS THAN 70 milliGRAM(s)/deciliter      OBJECTIVE:  Vital Signs Last 24 Hrs  T(C): 36.6 (15 Kennedy 2023 04:12), Max: 36.6 (2023 15:56)  T(F): 97.8 (15 Kennedy 2023 04:12), Max: 97.9 (2023 15:56)  HR: 93 (15 Kennedy 2023 04:12) (76 - 93)  BP: 102/63 (15 Kennedy 2023 04:12) (90/76 - 114/81)  BP(mean): 80 (2023 22:28) (80 - 80)  RR: 18 (15 Kennedy 2023 04:12) (16 - 26)  SpO2: 96% (15 Kennedy 2023 04:12) (95% - 100%)    Parameters below as of 15 Kennedy 2023 04:12  Patient On (Oxygen Delivery Method): room air        I&O's Summary    2023 07:01  -  15 Kennedy 2023 06:59  --------------------------------------------------------  IN: 0 mL / OUT: 300 mL / NET: -300 mL        PHYSICAL EXAM:  GENERAL: Laying comfortably, NAD  HEENT: NCAT, PERRLA, EOMI, no scleral icterus, no LAD  LUNG: CTABL; No wheezes, crackles, or rhonchi  HEART: RRR; normal S1/S2; No murmurs, rubs, or gallops  ABDOMEN: +BS, soft, nontender, nondistended, no HSM; No rebound, guarding, or rigidity  EXTREMITIES:  No LE edema b/l, 2+ Peripheral Pulses, No clubbing or cyanosis  NEUROLOGY: AOx3, non-focal, strength 5/5 in all extremities, sensation intact  PSYCH: calm and cooperative  SKIN: No rashes or lesions    LABS:                        9.2    5.22  )-----------( 301      ( 15 Kennedy 2023 06:06 )             31.1     Auto Eosinophil # x     / Auto Eosinophil % x     / Auto Neutrophil # x     / Auto Neutrophil % x     / BANDS % x                            8.7    6.33  )-----------( 312      ( 2023 16:13 )             29.3     Auto Eosinophil # 0.01  / Auto Eosinophil % 0.2   / Auto Neutrophil # 4.19  / Auto Neutrophil % 66.1  / BANDS % x        06-15    137  |  102  |  41<H>  ----------------------------<  88  4.8   |  22  |  3.20<H>  06-14    138  |  104  |  40<H>  ----------------------------<  36<LL>  4.1   |  21<L>  |  3.19<H>    Ca    8.5      15 Kennedy 2023 06:05  Ca    8.4      2023 16:13  Phos  3.6     06-15  Mg     1.7     06-15    TPro  6.0  /  Alb  3.2<L>  /  TBili  0.5  /  DBili  x   /  AST  19  /  ALT  14  /  AlkPhos  54  06-15  TPro  5.8<L>  /  Alb  3.1<L>  /  TBili  0.3  /  DBili  x   /  AST  17  /  ALT  12  /  AlkPhos  53  06-14    PT/INR - ( 2023 16:13 )   PT: 18.4 sec;   INR: 1.59 ratio         PTT - ( 2023 16:13 )  PTT:38.3 sec  CARDIAC MARKERS ( 15 Kennedy 2023 06:06 )  x     / x     / x     / x     / 2.4 ng/mL      Urinalysis Basic - ( 2023 20:23 )  Color: Light Yellow / Appearance: Clear / S.013 / pH: x  Gluc: x / Ketone: Negative  / Bili: Negative / Urobili: Negative   Blood: x / Protein: Negative / Nitrite: Negative   Leuk Esterase: Negative / RBC: x / WBC x   Sq Epi: x / Non Sq Epi: x / Bacteria: x          CAPILLARY BLOOD GLUCOSE  POCT Blood Glucose.: 87 mg/dL (2023 19:11)  POCT Blood Glucose.: 79 mg/dL (2023 16:40)

## 2023-06-15 NOTE — PHYSICAL THERAPY INITIAL EVALUATION ADULT - PERTINENT HX OF CURRENT PROBLEM, REHAB EVAL
73 y.o. 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 Patient states that since he left the hospital, his sugars have not been high but he has had intermittent low readings. Yesterday, his blood sugars were in the 30s-50s before presentation. He denies fevers, chills, chest pain, SOB, nausea, vomiting, dizziness, abdominal pain, dysuria, hematuria. When asked about difficulty breathing, he relays that he has had a cough but otherwise hasn't had shortness of breath. Pt denies any sick contacts or recent travel. George Mccracken called for collateral - states that he checks in on the patient daily and administers his insulin. He was giving him his prescribed admelog and confirmed low blood sugar readings. He stated he would correct this by giving his brother juice. Notes that the patient has been skipping meals or eating only 3 spoonfuls of meals. He has also not been drinking much fluid for fear of "swelling up" especially in the legs. Of note, last admission earlier this month with acute on chronic systolic heart failure with improvement s/p diuresis; discharged on bumex. Course c/b steroid-induced hyperglycemia 2/2 prednisone taken for Nicole syndrome. In ED, pt received bumex 2 mg IV 1x. Had hypoglycemia episode with FS 64.

## 2023-06-15 NOTE — CONSULT NOTE ADULT - ATTENDING COMMENTS
I have seen this patient with the fellow and agree with their assessment and plan. I was physically present for significant portions of the evaluation and management (E/M) service provided.  I agree with the above history, physical, and plan which I have reviewed and edited where appropriate.    OHT and baseline CKD and now NORMAN in setting of low BP and elevated tacro level  Level improving  BP is low and orthostatic today  Hold diuretics, may benefit from gentle 250-500cc bolus but only if ok with Medicine and CHF team    Sarkis Hutchins MD  Office   Contact me directly via [employer] Teams     (After 5 pm or on weekends please page the on-call fellow/attending, can check [email or web address] for schedule. Login is [email or web address], schedule under [facility] medicine, psych, derm)    For weekend coverage, call Dr Yaya Walters( fellow) and Dr. Umm Fang( attending)  For tomorrow, Dr Rene Pope

## 2023-06-15 NOTE — CONSULT NOTE ADULT - PROBLEM SELECTOR RECOMMENDATION 4
- will continue to dose tacro for goal 6-8, please obtain tacro level prior to AM dose  - continue pred 30mg for hemolytic anemia as stated above   - off cellcept

## 2023-06-15 NOTE — PATIENT PROFILE ADULT - FALL HARM RISK - HARM RISK INTERVENTIONS

## 2023-06-15 NOTE — CONSULT NOTE ADULT - PROBLEM SELECTOR RECOMMENDATION 6
- upon admission noted to have NORMAN, creatinine currently 3.2  - continue bumex 1mg PO QD  - trend daily

## 2023-06-15 NOTE — CONSULT NOTE ADULT - NS PANP COMMENT GEN_ALL_CORE FT
5 yrs post OHT. Issues:  LAD/RV fistula with severe LAD dilation not amenable to intervention. Some degree of LV dysfunction.   hemolytic anemia refractory to high dose steriods. Scheduled for outpatient ritoxan. no recent transfusions.   intolerant of cellcept due to leukopenia.   multiple serious infections in the past- mTOR d/c for that reason  Last annual Dec Feb 2023. No CAD. Last Bx Feb 2022. No ACR, but C4D staining. No DSAs.   Admission March 2023 viral pneumonia   Admission 5.25-6.1: Volume overload, worsening LV function and recurrent falls:  Diuresis.   Allosure < .12   Social work intervention to seek more support at home.   d/c 2.1   home meds:   asa, bumex 1 QD, eloqjuis, Insulin, toprol 200, PPi, prava, pred 30, bactrim SS, prograf 2 bid. vacyte 450 QOD, valtessa.   Found at home altered and weak. FBS 50s.   In ED: given food. afebrile   Afebrile SR 80-90, 92/-102/     06-15    137  |  102  |  41<H>  ----------------------------<  88  4.8   |  22  |  3.20<H>  Ca    8.5      15 Kennedy 2023 06:05  Phos  3.6     06-15  Mg     1.7     06-15  TPro  6.0  /  Alb  3.2<L>  /  TBili  0.5  /  DBili  x   /  AST  19  /  ALT  14  /  AlkPhos  54  06-15                        9.2    5.22  )-----------( 301      ( 15 Kennedy 2023 06:06 )             31.1   Hb 9.2 (D/C 10.5)  prograf level 10.8   Imp:  Admission for hypoglycemia and worsening renal function.   Plan;  Endo consult.  Patient needs further home supports including daily nurse visits.   decrease prograf 1.5 bid   CRISTIANOE  Marvin Campbell 5 yrs post OHT. Issues:  LAD/RV fistula with severe LAD dilation not amenable to intervention. Some degree of LV dysfunction.   hemolytic anemia refractory to high dose steriods. Scheduled for outpatient ritoxan. no recent transfusions.   intolerant of cellcept due to leukopenia.   multiple serious infections in the past- mTOR d/c for that reason  Last annual Dec Feb 2023. No CAD. Last Bx Feb 2022. No ACR, but C4D staining. No DSAs.   Admission March 2023 viral pneumonia   Admission 5.25-6.1: Volume overload, worsening LV function and recurrent falls:  Diuresis.   Allosure < .12   Social work intervention to seek more support at home.   d/c 2.1   home meds:   asa, bumex 1 QD, eloqjuis, Insulin, toprol 200, PPi, prava, pred 30, bactrim SS, prograf 2 bid. vacyte 450 QOD, valtessa.   Found at home altered and weak. FBS 50s.   In ED: given food. afebrile   Afebrile SR 80-90, 92/-102/     06-15    137  |  102  |  41<H>  ----------------------------<  88  4.8   |  22  |  3.20<H>  Ca    8.5      15 Kennedy 2023 06:05  Phos  3.6     06-15  Mg     1.7     06-15  TPro  6.0  /  Alb  3.2<L>  /  TBili  0.5  /  DBili  x   /  AST  19  /  ALT  14  /  AlkPhos  54  06-15                        9.2    5.22  )-----------( 301      ( 15 Kennedy 2023 06:06 )             31.1   Hb 9.2 (D/C 10.5)  prograf level 10.8   Imp:  Admission for hypoglycemia and worsening renal function.   Plan;  Endo consult.  Patient needs further home supports including daily nurse visits.   decrease prograf 1.5 bid   TTE  did patient get ritoxan as outpatient   Marvin Campbell

## 2023-06-16 LAB
ANION GAP SERPL CALC-SCNC: 14 MMOL/L — SIGNIFICANT CHANGE UP (ref 5–17)
BUN SERPL-MCNC: 49 MG/DL — HIGH (ref 7–23)
CALCIUM SERPL-MCNC: 8.1 MG/DL — LOW (ref 8.4–10.5)
CHLORIDE SERPL-SCNC: 100 MMOL/L — SIGNIFICANT CHANGE UP (ref 96–108)
CO2 SERPL-SCNC: 20 MMOL/L — LOW (ref 22–31)
CREAT SERPL-MCNC: 3.71 MG/DL — HIGH (ref 0.5–1.3)
EGFR: 16 ML/MIN/1.73M2 — LOW
GLUCOSE BLDC GLUCOMTR-MCNC: 105 MG/DL — HIGH (ref 70–99)
GLUCOSE BLDC GLUCOMTR-MCNC: 140 MG/DL — HIGH (ref 70–99)
GLUCOSE BLDC GLUCOMTR-MCNC: 146 MG/DL — HIGH (ref 70–99)
GLUCOSE BLDC GLUCOMTR-MCNC: 164 MG/DL — HIGH (ref 70–99)
GLUCOSE BLDC GLUCOMTR-MCNC: 187 MG/DL — HIGH (ref 70–99)
GLUCOSE BLDC GLUCOMTR-MCNC: 220 MG/DL — HIGH (ref 70–99)
GLUCOSE SERPL-MCNC: 138 MG/DL — HIGH (ref 70–99)
MAGNESIUM SERPL-MCNC: 1.7 MG/DL — SIGNIFICANT CHANGE UP (ref 1.6–2.6)
PHOSPHATE SERPL-MCNC: 3.2 MG/DL — SIGNIFICANT CHANGE UP (ref 2.5–4.5)
POTASSIUM SERPL-MCNC: 4.5 MMOL/L — SIGNIFICANT CHANGE UP (ref 3.5–5.3)
POTASSIUM SERPL-SCNC: 4.5 MMOL/L — SIGNIFICANT CHANGE UP (ref 3.5–5.3)
SODIUM SERPL-SCNC: 134 MMOL/L — LOW (ref 135–145)
TACROLIMUS SERPL-MCNC: 14.9 NG/ML — SIGNIFICANT CHANGE UP

## 2023-06-16 PROCEDURE — 99233 SBSQ HOSP IP/OBS HIGH 50: CPT | Mod: GC

## 2023-06-16 PROCEDURE — 99232 SBSQ HOSP IP/OBS MODERATE 35: CPT | Mod: GC

## 2023-06-16 PROCEDURE — 93356 MYOCRD STRAIN IMG SPCKL TRCK: CPT

## 2023-06-16 PROCEDURE — 99232 SBSQ HOSP IP/OBS MODERATE 35: CPT

## 2023-06-16 PROCEDURE — 71045 X-RAY EXAM CHEST 1 VIEW: CPT | Mod: 26

## 2023-06-16 PROCEDURE — 76376 3D RENDER W/INTRP POSTPROCES: CPT | Mod: 26

## 2023-06-16 PROCEDURE — 93306 TTE W/DOPPLER COMPLETE: CPT | Mod: 26

## 2023-06-16 RX ORDER — INSULIN GLARGINE 100 [IU]/ML
10 INJECTION, SOLUTION SUBCUTANEOUS AT BEDTIME
Refills: 0 | Status: DISCONTINUED | OUTPATIENT
Start: 2023-06-16 | End: 2023-06-16

## 2023-06-16 RX ORDER — TACROLIMUS 5 MG/1
1.5 CAPSULE ORAL
Refills: 0 | Status: DISCONTINUED | OUTPATIENT
Start: 2023-06-16 | End: 2023-06-17

## 2023-06-16 RX ORDER — INSULIN LISPRO 100/ML
VIAL (ML) SUBCUTANEOUS
Refills: 0 | Status: DISCONTINUED | OUTPATIENT
Start: 2023-06-16 | End: 2023-06-23

## 2023-06-16 RX ORDER — VALGANCICLOVIR 450 MG/1
450 TABLET, FILM COATED ORAL
Refills: 0 | Status: DISCONTINUED | OUTPATIENT
Start: 2023-06-19 | End: 2023-06-23

## 2023-06-16 RX ORDER — INSULIN GLARGINE 100 [IU]/ML
8 INJECTION, SOLUTION SUBCUTANEOUS EVERY MORNING
Refills: 0 | Status: DISCONTINUED | OUTPATIENT
Start: 2023-06-17 | End: 2023-06-23

## 2023-06-16 RX ADMIN — Medication 1 MILLIGRAM(S): at 13:04

## 2023-06-16 RX ADMIN — CHLORHEXIDINE GLUCONATE 1 APPLICATION(S): 213 SOLUTION TOPICAL at 10:36

## 2023-06-16 RX ADMIN — Medication 1 TABLET(S): at 06:29

## 2023-06-16 RX ADMIN — Medication 2: at 13:05

## 2023-06-16 RX ADMIN — Medication 500000 UNIT(S): at 06:30

## 2023-06-16 RX ADMIN — VALGANCICLOVIR 450 MILLIGRAM(S): 450 TABLET, FILM COATED ORAL at 13:05

## 2023-06-16 RX ADMIN — TACROLIMUS 0.5 MILLIGRAM(S): 5 CAPSULE ORAL at 20:55

## 2023-06-16 RX ADMIN — SENNA PLUS 2 TABLET(S): 8.6 TABLET ORAL at 20:57

## 2023-06-16 RX ADMIN — Medication 650 MILLIGRAM(S): at 17:57

## 2023-06-16 RX ADMIN — Medication 1: at 17:07

## 2023-06-16 RX ADMIN — Medication 30 MILLIGRAM(S): at 06:30

## 2023-06-16 RX ADMIN — TACROLIMUS 1.5 MILLIGRAM(S): 5 CAPSULE ORAL at 06:30

## 2023-06-16 RX ADMIN — APIXABAN 5 MILLIGRAM(S): 2.5 TABLET, FILM COATED ORAL at 06:30

## 2023-06-16 RX ADMIN — Medication 81 MILLIGRAM(S): at 13:04

## 2023-06-16 RX ADMIN — Medication 500000 UNIT(S): at 20:57

## 2023-06-16 RX ADMIN — Medication 650 MILLIGRAM(S): at 20:09

## 2023-06-16 RX ADMIN — Medication 500000 UNIT(S): at 13:08

## 2023-06-16 RX ADMIN — APIXABAN 5 MILLIGRAM(S): 2.5 TABLET, FILM COATED ORAL at 17:07

## 2023-06-16 NOTE — PROGRESS NOTE ADULT - PROBLEM SELECTOR PLAN 2
- BP continue to remain liable   - continue Toprol  mg PO QD   - remains off losartan since last admission  - encourage to increase PO intake  - please avoid giving IV fluids  - due to his history of infections blood cx were sent on 6/15  - ECHO completed and awaiting read

## 2023-06-16 NOTE — PROGRESS NOTE ADULT - ASSESSMENT
73M with hx of nonischemic cardiomyopathy s/p HM2 LVAD in June 2017 complicated possible pump thrombosis who underwent OHT 2/23/18 with hepatitis C donor, hx of hemolytic anemia (treated with prednisone and Rituximab), LAD-RV fistula (Unable to be closed) was recently admitted to [facility] (5/25-6/2) volume overloaded now presents after being found confused/alter at home. Upon arrival in the ER he was found to have FS in the 30s-50s and labs revealed worsening NORMAN with creatinine up to 3.2. Since being admitted he has been hypotensive with SBP sitting in the 80s and is asymptomiatic. Given his history he is currently undergo infectious workup. He is also noted to be volume overloaded with pitting edema and elevated JVP.       73M with hx of nonischemic cardiomyopathy s/p HM2 LVAD in June 2017 complicated possible pump thrombosis who underwent OHT 2/23/18 with hepatitis C donor, hx of hemolytic anemia (treated with prednisone and Rituximab), LAD-RV fistula (Unable to be closed) was recently admitted to [facility] (5/25-6/2) volume overloaded now presents after being found confused/alter at home. Upon arrival in the ER he was found to have FS in the 30s-50s and labs revealed worsening NORMAN with creatinine up to 3.2. Since being admitted he has been hypotensive with SBP sitting in the 80s and is asymptomatic.  His renal function continues to worsen, likely related to dehydration. Currently holding all diuretics and adjust immunosuppression Will need to determine safe dispo plan.

## 2023-06-16 NOTE — PROGRESS NOTE ADULT - NS ATTEND AMEND GEN_ALL_CORE FT
Patient seen by endo: insulin adjusted. BS as low as 38 yeterday morning. today 138  Cr 3.2 to 3.7 primary has d/c diuretics.     meds: nytatin, bactrim SS QD, valcyte 450 QD, toprol 100 (200), eloquis 5 bid, asa, lantis, prograf 1.5 bid (2 bid) ( p, 10.8, 14.6) pred 30, bumex 1mg held.   HR 80-90SR, 97/-110/ 174 (d/c 172)   06-16    134<L>  |  100  |  49<H>  ----------------------------<  138<H>  4.5   |  20<L>  |  3.71<H>    Ca    8.1<L>      16 Jun 2023 10:36  Phos  3.2     06-16  Mg     1.7     06-16  TPro  6.0  /  Alb  3.2<L>  /  TBili  0.5  /  DBili  x   /  AST  19  /  ALT  14  /  AlkPhos  54  06-15                          9.2    5.22  )-----------( 301      ( 15 Kennedy 2023 06:06 )             31.1 Patient seen by endo: insulin adjusted. BS as low as 38 yeterday morning. today 138  Cr 3.2 to 3.7 primary has d/c diuretics.     meds: nytatin, bactrim SS QD, valcyte 450 QD, toprol 100 (200), eloquis 5 bid, asa, lantis, prograf 1.5 bid (2 bid) ( p, 10.8, 14.6) pred 30, bumex 1mg held.   HR 80-90SR, 97/-110/ 174 (d/c 172)   06-16    134<L>  |  100  |  49<H>  ----------------------------<  138<H>  4.5   |  20<L>  |  3.71<H>    Ca    8.1<L>      16 Jun 2023 10:36  Phos  3.2     06-16  Mg     1.7     06-16  TPro  6.0  /  Alb  3.2<L>  /  TBili  0.5  /  DBili  x   /  AST  19  /  ALT  14  /  AlkPhos  54  06-15                          9.2    5.22  )-----------( 301      ( 15 Kennedy 2023 06:06 )             31.1  not worsening renal function. while patient has some LE edema, over exam consistent with euvolemia.   Plan:  endo to continue to adjust insulin.  TRANSPLANT PHARMACY WILL ADJUST TRANSPLANT MEDICATIONS FOR RENAL FUNCTION (VALCYTE, BACTRIM)  We will adjust prograf today based upon levels.   agree with hold diuretics and follow daily standing weights.   daily PT.  TTE today  Marvin Campbell

## 2023-06-16 NOTE — PROGRESS NOTE ADULT - SUBJECTIVE AND OBJECTIVE BOX
***************************************************************  Lio Talley, PGY1  Internal Medicine   TEAMS Preferred  ***************************************************************    BILLY RUSSELL  73y  MRN: [id number]  23 (2d)    Patient is a 73y old  Male who presents with a chief complaint of Hypoglycemia (15 Kennedy 2023 16:40)      SUBJECTIVE / OVERNIGHT EVENTS:   No acute overnight events. Pt seen and examined at bedside. Denies fevers, chills, CP, SOB, Abdominal pain, N/V, Constipation, Diarrhea. Last BM     12 Point ROS negative with the exception of the above    MEDICATIONS  (STANDING):  apixaban 5 milliGRAM(s) Oral every 12 hours  aspirin  chewable 81 milliGRAM(s) Oral daily  chlorhexidine 2% Cloths 1 Application(s) Topical <User Schedule>  dextrose 5%. 1000 milliLiter(s) (50 mL/Hr) IV Continuous <Continuous>  dextrose 5%. 1000 milliLiter(s) (100 mL/Hr) IV Continuous <Continuous>  dextrose 50% Injectable 25 Gram(s) IV Push once  dextrose 50% Injectable 12.5 Gram(s) IV Push once  dextrose 50% Injectable 25 Gram(s) IV Push once  folic acid 1 milliGRAM(s) Oral daily  glucagon  Injectable 1 milliGRAM(s) IntraMuscular once  insulin glargine Injectable (LANTUS) 10 Unit(s) SubCutaneous every morning  insulin lispro (ADMELOG) corrective regimen sliding scale   SubCutaneous three times a day before meals  insulin lispro (ADMELOG) corrective regimen sliding scale   SubCutaneous at bedtime  metoprolol succinate  milliGRAM(s) Oral daily  nystatin    Suspension 028951 Unit(s) Oral three times a day  predniSONE   Tablet 30 milliGRAM(s) Oral daily  senna 2 Tablet(s) Oral at bedtime  tacrolimus 1.5 milliGRAM(s) Oral two times a day  trimethoprim   80 mG/sulfamethoxazole 400 mG 1 Tablet(s) Oral every 24 hours  valGANciclovir 450 milliGRAM(s) Oral daily    MEDICATIONS  (PRN):  acetaminophen     Tablet .. 650 milliGRAM(s) Oral every 6 hours PRN Temp greater or equal to 38C (100.4F), Mild Pain (1 - 3)  dextrose Oral Gel 15 Gram(s) Oral once PRN Blood Glucose LESS THAN 70 milliGRAM(s)/deciliter      OBJECTIVE:  Vital Signs Last 24 Hrs  T(C): 36.6 (2023 04:35), Max: 36.9 (15 Kennedy 2023 21:45)  T(F): 97.8 (2023 04:35), Max: 98.4 (15 Kennedy 2023 21:45)  HR: 91 (2023 04:35) (88 - 95)  BP: 97/65 (2023 04:35) (94/63 - 110/73)  BP(mean): --  RR: 18 (2023 04:35) (18 - 18)  SpO2: 96% (2023 04:35) (95% - 98%)    Parameters below as of 2023 04:35  Patient On (Oxygen Delivery Method): room air        I&O's Summary    2023 07:  -  15 Kennedy 2023 07:00  --------------------------------------------------------  IN: 0 mL / OUT: 300 mL / NET: -300 mL    15 Kennedy 2023 07:01  -  2023 06:42  --------------------------------------------------------  IN: 720 mL / OUT: 0 mL / NET: 720 mL        PHYSICAL EXAM:  GENERAL: Laying comfortably, NAD  HEENT: NCAT, PERRLA, EOMI, no scleral icterus, no LAD  NECK: No JVD, supple  LUNG: CTABL; No wheezes, crackles, or rhonchi  HEART: RRR; normal S1/S2; No murmurs, rubs, or gallops  ABDOMEN: +BS, soft, nontender, nondistended, no HSM; No rebound, guarding, or rigidity  EXTREMITIES:  No LE edema b/l, 2+ Peripheral Pulses, No clubbing or cyanosis  NEUROLOGY: AOx3, non-focal, strength 5/5 in all extremities, sensation intact  PSYCH: calm and cooperative  SKIN: No rashes or lesions    LABS:                        9.2    5.22  )-----------( 301      ( 15 Kennedy 2023 06:06 )             31.1     Auto Eosinophil # 0.00  / Auto Eosinophil % 0.0   / Auto Neutrophil # 4.16  / Auto Neutrophil % 79.7  / BANDS % x                            8.7    6.33  )-----------( 312      ( 2023 16:13 )             29.3     Auto Eosinophil # 0.01  / Auto Eosinophil % 0.2   / Auto Neutrophil # 4.19  / Auto Neutrophil % 66.1  / BANDS % x        06-15    137  |  102  |  41<H>  ----------------------------<  88  4.8   |  22  |  3.20<H>  06-14    138  |  104  |  40<H>  ----------------------------<  36<LL>  4.1   |  21<L>  |  3.19<H>    Ca    8.5      15 Kennedy 2023 06:05  Ca    8.4      2023 16:13  Phos  3.6     06-15  Mg     1.7     06-15    TPro  6.0  /  Alb  3.2<L>  /  TBili  0.5  /  DBili  x   /  AST  19  /  ALT  14  /  AlkPhos  54  06-15  TPro  5.8<L>  /  Alb  3.1<L>  /  TBili  0.3  /  DBili  x   /  AST  17  /  ALT  12  /  AlkPhos  53  06-14    PT/INR - ( 2023 16:13 )   PT: 18.4 sec;   INR: 1.59 ratio         PTT - ( 2023 16:13 )  PTT:38.3 sec  CARDIAC MARKERS ( 15 Kennedy 2023 06:06 )  x     / x     / x     / x     / 2.4 ng/mL      Urinalysis Basic - ( 2023 20:23 )    Color: Light Yellow / Appearance: Clear / S.013 / pH: x  Gluc: x / Ketone: Negative  / Bili: Negative / Urobili: Negative   Blood: x / Protein: Negative / Nitrite: Negative   Leuk Esterase: Negative / RBC: x / WBC x   Sq Epi: x / Non Sq Epi: x / Bacteria: x          CAPILLARY BLOOD GLUCOSE      POCT Blood Glucose.: 80 mg/dL (15 Kennedy 2023 21:41)  POCT Blood Glucose.: 237 mg/dL (15 Kennedy 2023 16:23)  POCT Blood Glucose.: 165 mg/dL (15 Kennedy 2023 11:26)  POCT Blood Glucose.: 172 mg/dL (15 Kennedy 2023 08:03)        RADIOLOGY & ADDITIONAL TESTS:     ***************************************************************  Lio Talley, PGY1  Internal Medicine   TEAMS Preferred  ***************************************************************    BILLY RUSSELL  73y  MRN: [id number]  23 (2d)    Patient is a 73y old  Male who presents with a chief complaint of Hypoglycemia (15 Kennedy 2023 16:40)      SUBJECTIVE / OVERNIGHT EVENTS:   No acute overnight events. Pt seen and examined at bedside. Denies fevers, chills, CP, SOB, Abdominal pain, N/V, Constipation, Diarrhea. No acute complaints.   BP 93/64, metoprolol held this am.     12 Point ROS negative with the exception of the above    MEDICATIONS  (STANDING):  apixaban 5 milliGRAM(s) Oral every 12 hours  aspirin  chewable 81 milliGRAM(s) Oral daily  chlorhexidine 2% Cloths 1 Application(s) Topical <User Schedule>  dextrose 5%. 1000 milliLiter(s) (50 mL/Hr) IV Continuous <Continuous>  dextrose 5%. 1000 milliLiter(s) (100 mL/Hr) IV Continuous <Continuous>  dextrose 50% Injectable 25 Gram(s) IV Push once  dextrose 50% Injectable 12.5 Gram(s) IV Push once  dextrose 50% Injectable 25 Gram(s) IV Push once  folic acid 1 milliGRAM(s) Oral daily  glucagon  Injectable 1 milliGRAM(s) IntraMuscular once  insulin glargine Injectable (LANTUS) 10 Unit(s) SubCutaneous every morning  insulin lispro (ADMELOG) corrective regimen sliding scale   SubCutaneous three times a day before meals  insulin lispro (ADMELOG) corrective regimen sliding scale   SubCutaneous at bedtime  metoprolol succinate  milliGRAM(s) Oral daily  nystatin    Suspension 133511 Unit(s) Oral three times a day  predniSONE   Tablet 30 milliGRAM(s) Oral daily  senna 2 Tablet(s) Oral at bedtime  tacrolimus 1.5 milliGRAM(s) Oral two times a day  trimethoprim   80 mG/sulfamethoxazole 400 mG 1 Tablet(s) Oral every 24 hours  valGANciclovir 450 milliGRAM(s) Oral daily    MEDICATIONS  (PRN):  acetaminophen     Tablet .. 650 milliGRAM(s) Oral every 6 hours PRN Temp greater or equal to 38C (100.4F), Mild Pain (1 - 3)  dextrose Oral Gel 15 Gram(s) Oral once PRN Blood Glucose LESS THAN 70 milliGRAM(s)/deciliter      OBJECTIVE:  Vital Signs Last 24 Hrs  T(C): 36.6 (2023 04:35), Max: 36.9 (15 Kennedy 2023 21:45)  T(F): 97.8 (2023 04:35), Max: 98.4 (15 Kennedy 2023 21:45)  HR: 91 (2023 04:35) (88 - 95)  BP: 97/65 (2023 04:35) (94/63 - 110/73)  BP(mean): --  RR: 18 (2023 04:35) (18 - 18)  SpO2: 96% (2023 04:35) (95% - 98%)    Parameters below as of 2023 04:35  Patient On (Oxygen Delivery Method): room air        I&O's Summary    2023 07:01  -  15 Kennedy 2023 07:00  --------------------------------------------------------  IN: 0 mL / OUT: 300 mL / NET: -300 mL    15 Kennedy 2023 07:01  -  2023 06:42  --------------------------------------------------------  IN: 720 mL / OUT: 0 mL / NET: 720 mL          PHYSICAL EXAM:  GENERAL: Laying comfortably, NAD  HEENT: NCAT, PERRLA, EOMI, no scleral icterus, no LAD  LUNG: CTABL; No wheezes, crackles, or rhonchi  HEART: RRR; normal S1/S2; No murmurs, rubs, or gallops  ABDOMEN: +BS, soft, nontender, nondistended, no HSM; No rebound, guarding, or rigidity  EXTREMITIES:  No LE edema b/l, 2+ Peripheral Pulses, No clubbing or cyanosis  NEUROLOGY: AOx3, non-focal, strength 5/5 in all extremities, sensation intact  PSYCH: calm and cooperative  SKIN: No rashes or lesions    LABS:                          9.2    5.22  )-----------( 301      ( 15 Kennedy 2023 06:06 )             31.1     Auto Eosinophil # 0.00  / Auto Eosinophil % 0.0   / Auto Neutrophil # 4.16  / Auto Neutrophil % 79.7  / BANDS % x                            8.7    6.33  )-----------( 312      ( 2023 16:13 )             29.3     Auto Eosinophil # 0.01  / Auto Eosinophil % 0.2   / Auto Neutrophil # 4.19  / Auto Neutrophil % 66.1  / BANDS % x        06-15    137  |  102  |  41<H>  ----------------------------<  88  4.8   |  22  |  3.20<H>  06-14    138  |  104  |  40<H>  ----------------------------<  36<LL>  4.1   |  21<L>  |  3.19<H>    Ca    8.5      15 Kennedy 2023 06:05  Ca    8.4      2023 16:13  Phos  3.6     06-15  Mg     1.7     06-15    TPro  6.0  /  Alb  3.2<L>  /  TBili  0.5  /  DBili  x   /  AST  19  /  ALT  14  /  AlkPhos  54  06-15  TPro  5.8<L>  /  Alb  3.1<L>  /  TBili  0.3  /  DBili  x   /  AST  17  /  ALT  12  /  AlkPhos  53  06-14    PT/INR - ( 2023 16:13 )   PT: 18.4 sec;   INR: 1.59 ratio         PTT - ( 2023 16:13 )  PTT:38.3 sec  CARDIAC MARKERS ( 15 Kennedy 2023 06:06 )  x     / x     / x     / x     / 2.4 ng/mL      Urinalysis Basic - ( 2023 20:23 )    Color: Light Yellow / Appearance: Clear / S.013 / pH: x  Gluc: x / Ketone: Negative  / Bili: Negative / Urobili: Negative   Blood: x / Protein: Negative / Nitrite: Negative   Leuk Esterase: Negative / RBC: x / WBC x   Sq Epi: x / Non Sq Epi: x / Bacteria: x          CAPILLARY BLOOD GLUCOSE  POCT Blood Glucose.: 80 mg/dL (15 Kennedy 2023 21:41)  POCT Blood Glucose.: 237 mg/dL (15 Kennedy 2023 16:23)  POCT Blood Glucose.: 165 mg/dL (15 Kennedy 2023 11:26)  POCT Blood Glucose.: 172 mg/dL (15 Kennedy 2023 08:03)        RADIOLOGY & ADDITIONAL TESTS:

## 2023-06-16 NOTE — PROGRESS NOTE ADULT - PROBLEM SELECTOR PLAN 1
- FBG tightly controlled. Requires less insulin due to NORMAN  - Test BGs ACTID, HS and 2 am   - Decrease Lantus to 8 units, will  reschedule it to 8 am tomorrow( Lantus 10 units given 10 pm last night)  - Continue Admelog low correction scale pre meals and low dose correction scale at HS and 2am   - Hold off starting premeal insulin for now. Consider starting insulin if BGs > 180s more than X2 and if pt eats consistently.    -Keep hypoglycemia protocol in place   -Carb consistent diet   - Please notify endocrine team if prednisone doses to be tapered    Discharge planning :   - Likely discharge on basal  & bolus. Doses will be determined based on BG trend, insulin requiement, and oral intake.   - Suggested to God brother for patient to take oral medications when the home aid is present or when he is present to ensure appropriate administration  - Ophthalmology and podiatry follow up outpatient   - Has follow up outpatient appointment with Endocrine Dr. Clemens Aug 15th [address] [facility] [place] [address] [place].

## 2023-06-16 NOTE — PROGRESS NOTE ADULT - ASSESSMENT
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

## 2023-06-16 NOTE — PROGRESS NOTE ADULT - ATTENDING COMMENTS
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     Hypoglycemia now improved, Endocrinology following. Borderline hypotensive, orthostatic on 6/16- holding diuretics. No fluids for now per HF team.     Worsening NORMAN, monitor. Nephrology following.

## 2023-06-16 NOTE — PROGRESS NOTE ADULT - PROBLEM SELECTOR PLAN 4
- will continue to dose tacro for goal 6-8, please obtain tacro level prior to AM dose  - continue pred 30mg for hemolytic anemia as stated above   - off cellcept.

## 2023-06-16 NOTE — PROGRESS NOTE ADULT - ASSESSMENT
73 year old male, with past history of HTN, HLD and T2DM on insulin, complicated by steroid-induced hyperglycemia, presents with symptomatic hypoglycemia in the setting of reduced oral intake.  Endocrine Following for hypoglycemia. BG Goal 100-180mg/dl.   Tolerating POs with good appetite. Fasting  with variable random BGs  ( 100-200s). Needs less insulin due to NORMAN.    Home regiment: lantus 20 units in the morning 9am, admelog 30/14/4   HgbA1C 8.6 %( 5/24/23)

## 2023-06-16 NOTE — PROVIDER CONTACT NOTE (OTHER) - ASSESSMENT
pt a&o x4, no c/o cp or SOB at this time. Pt refuses staff/managers attempts of placing an IV. IV team paged x2 awaiting response.

## 2023-06-16 NOTE — PROGRESS NOTE ADULT - ATTENDING COMMENTS
CKD III, now with NORMAN due to hemodynamic compromise  Feeling alright today, lying flat  Labs not resulted as yet  Will follow, remainder per fellow

## 2023-06-16 NOTE — PROGRESS NOTE ADULT - PROBLEM SELECTOR PLAN 2
Admitted with Cr of 3/19, normal range 1.5-2.1. CKD IV.  - NORMAN likely 2/2 diuresis and poor PO intake, suspect pre-renal FeUrea. F/u urine studies   - Encourage PO intake as pt able to tolerate PO  - FeUrea 30.1% --> indicate of pre-renal disease  - f/u renal US  - hold home bumex 1 daily pending cards/nephro  - Avoid nephrotoxic agents  - cardiorenal and transplant cards consulted Admitted with Cr of 3/19, normal range 1.5-2.1. CKD IV.  - NORMAN likely 2/2 diuresis and poor PO intake, suspect pre-renal FeUrea. F/u urine studies   - Encourage PO intake as pt able to tolerate PO  - FeUrea 30.1% --> indicative of pre-renal disease  - f/u renal US  - hold home bumex 1 daily pending cards/nephro  - Avoid nephrotoxic agents  - cardiorenal and transplant cards following

## 2023-06-16 NOTE — PROGRESS NOTE ADULT - PROBLEM SELECTOR PLAN 1
Pt admitted for tremors and sweats likely 2/2 hypoglycemia iso poor PO intake  -Pt likely needs titration of insulin regimen given decreased diet at home.   - Reportedly on home glargine 20 daily; admelog 40 pre-breakfast/14 pre-lunch/4 pre-dinner --> per pharm team and records, on admelog 3 pre breakfast  -C/w lantus 10 nightly and ISS. Recalculate premeal based on 24 hour ISS   -DASH diet with CC  - endocrine consulted, will appreciate recs Pt admitted for tremors and sweats likely 2/2 hypoglycemia iso poor PO intake  -Pt likely needs titration of insulin regimen given decreased diet at home.   - Reportedly on home glargine 20 daily; admelog 40 pre-breakfast/14 pre-lunch/4 pre-dinner --> per pharm team and records, on admelog 3 pre breakfast  - C/w lantus 10 and ISS. Recalculate premeal based on 24 hour ISS   - DASH diet with CC  - endocrine following, will appreciate recs

## 2023-06-16 NOTE — PROGRESS NOTE ADULT - PROBLEM SELECTOR PLAN 6
- noted to have worsening renal function, likely related to dehydration  - adjust immunosuppression as stated above  - holding diuretics

## 2023-06-16 NOTE — PROGRESS NOTE ADULT - PROBLEM SELECTOR PLAN 1
- readmitted with FS as low at 30-50, now improved  - endo consulted, appreciate recommendations  - currently on lantus 10 units.

## 2023-06-16 NOTE — PROGRESS NOTE ADULT - PROBLEM SELECTOR PLAN 1
Pt. with hemodynamically mediated NORMAN on CKD in the setting of hypotension and supra therapeutic tacrolimus levels. On review of [facility] DILEEP/[facility] pt noted to have a baseline SCr ranging between 1.8 -2 with most recent SCr from his previous hospitalization on 6/2 of 2.19. on admission Scr was elevated to 3.19 and was elevated at 3.2 on 6/15/23. Pt noted to have SBP in the 90's on admission along with supra-therapeutic tacrolimus level of 14.6 on 6/14.UA bland and urine lytes reviewed. Diuretics currently held. Consider dose adjustment of tacrolimus as per levels as per transplant cardiology, Pt with low BP readings. Pt likely with ATN. Check BMP. Please monitor for urinary retention with serial bladder scans. Monitor labs and urine output. Avoid nephrotoxins. Dose medications as per eGFR.

## 2023-06-16 NOTE — PROGRESS NOTE ADULT - SUBJECTIVE AND OBJECTIVE BOX
[facility] DIVISION OF KIDNEY DISEASES AND HYPERTENSION   FOLLOW UP NOTE    --------------------------------------------------------------------------------  Chief Complaint: NORMAN on CKD    24 hour events/subjective: Pt. was seen and examined today, not in distress. Denies fever, chills, SOB, CP, dysuria. Scr was elevated at 3.2 on 6/15/23. No BMP available today to review. No documented UOP.      PAST HISTORY  --------------------------------------------------------------------------------  No significant changes to PMH, PSH, FHx, SHx, unless otherwise noted    ALLERGIES & MEDICATIONS  --------------------------------------------------------------------------------  Allergies    No Known Allergies    Intolerances      Standing Inpatient Medications  apixaban 5 milliGRAM(s) Oral every 12 hours  aspirin  chewable 81 milliGRAM(s) Oral daily  chlorhexidine 2% Cloths 1 Application(s) Topical <User Schedule>  dextrose 5%. 1000 milliLiter(s) IV Continuous <Continuous>  dextrose 5%. 1000 milliLiter(s) IV Continuous <Continuous>  dextrose 50% Injectable 25 Gram(s) IV Push once  dextrose 50% Injectable 12.5 Gram(s) IV Push once  dextrose 50% Injectable 25 Gram(s) IV Push once  folic acid 1 milliGRAM(s) Oral daily  glucagon  Injectable 1 milliGRAM(s) IntraMuscular once  insulin glargine Injectable (LANTUS) 10 Unit(s) SubCutaneous at bedtime  insulin lispro (ADMELOG) corrective regimen sliding scale   SubCutaneous three times a day before meals  insulin lispro (ADMELOG) corrective regimen sliding scale   SubCutaneous at bedtime  metoprolol succinate  milliGRAM(s) Oral daily  nystatin    Suspension 610980 Unit(s) Oral three times a day  predniSONE   Tablet 30 milliGRAM(s) Oral daily  senna 2 Tablet(s) Oral at bedtime  tacrolimus 1.5 milliGRAM(s) Oral two times a day  trimethoprim   80 mG/sulfamethoxazole 400 mG 1 Tablet(s) Oral every 24 hours  valGANciclovir 450 milliGRAM(s) Oral daily    PRN Inpatient Medications  acetaminophen     Tablet .. 650 milliGRAM(s) Oral every 6 hours PRN  dextrose Oral Gel 15 Gram(s) Oral once PRN      REVIEW OF SYSTEMS  --------------------------------------------------------------------------------  Gen: No fevers/chills  Head/Eyes/Ears: No HA   Respiratory: No dyspnea, cough  CV: No chest pain, OHT  GI: No abdominal pain, diarrhea  : No dysuria, hematuria  MSK: No  edema  Skin: No rashes  Heme: No easy bruising or bleeding    VITALS/PHYSICAL EXAM  --------------------------------------------------------------------------------  T(C): 36.6 (06-16-23 @ 04:35), Max: 36.9 (06-15-23 @ 21:45)  HR: 105 (06-16-23 @ 08:45) (88 - 105)  BP: 93/62 (06-16-23 @ 08:45) (93/62 - 110/73)  RR: 18 (06-16-23 @ 08:45) (18 - 18)  SpO2: 95% (06-16-23 @ 08:45) (95% - 98%)  Wt(kg): --  Height (cm): 170.2 (06-14-23 @ 15:34)  Weight (kg): 72.5 (06-16-23 @ 06:47)  BMI (kg/m2): 25 (06-16-23 @ 06:47)  BSA (m2): 1.84 (06-16-23 @ 06:47)      06-15-23 @ 07:01  -  06-16-23 @ 07:00  --------------------------------------------------------  IN: 720 mL / OUT: 0 mL / NET: 720 mL      Physical Exam:  	Gen: elderly male, sitting comfortably  	HEENT: Anicteric  	Pulm: CTA B/L  	CV: S1S2+  	Abd: Soft, +BS       	Ext: No LE edema B/L  	Neuro: Awake  	Skin: Warm and dry    LABS/STUDIES  --------------------------------------------------------------------------------              9.2    5.22  >-----------<  301      [06-15-23 @ 06:06]              31.1     137  |  102  |  41  ----------------------------<  88      [06-15-23 @ 06:05]  4.8   |  22  |  3.20        Ca     8.5     [06-15-23 @ 06:05]      Mg     1.7     [06-15-23 @ 06:05]      Phos  3.6     [06-15-23 @ 06:05]    Creatinine Trend:  SCr 3.20 [06-15 @ 06:05]  SCr 3.19 [06-14 @ 16:13]  SCr 2.19 [06-02 @ 09:23]  SCr 2.02 [06-01 @ 08:57]  SCr 1.83 [05-31 @ 08:55]    Urinalysis - [06-14-23 @ 20:23]      Color Light Yellow / Appearance Clear / SG 1.013 / pH 5.5      Gluc Trace / Ketone Negative  / Bili Negative / Urobili Negative       Blood Negative / Protein Negative / Leuk Est Negative / Nitrite Negative      RBC  / WBC  / Hyaline  / Gran  / Sq Epi  / Non Sq Epi  / Bacteria     Urine Creatinine 56      [06-15-23 @ 01:34]  Urine Protein <7      [06-15-23 @ 01:34]  Urine Sodium 97      [06-15-23 @ 01:34]  Urine Urea Nitrogen 216      [06-15-23 @ 01:34]  Urine Potassium 14      [06-15-23 @ 01:34]  Urine Osmolality 308      [06-15-23 @ 01:33]    TacrolimusTacrolimus (), Serum: 10.8 ng/mL (06-15 @ 06:05)  Tacrolimus (), Serum: 14.6 ng/mL (06-14 @ 16:13)

## 2023-06-16 NOTE — PROGRESS NOTE ADULT - SUBJECTIVE AND OBJECTIVE BOX
ADVANCED HEART FAILURE & TRANSPLANT- PROGRESS NOTE  *To reach the NS1 Team from 8am to 5pm, please call [phone].  ___________________________________________________________________________    Subjective:  - states to still have some episodes of dizziness     Medications:  acetaminophen     Tablet .. 650 milliGRAM(s) Oral every 6 hours PRN  apixaban 5 milliGRAM(s) Oral every 12 hours  aspirin  chewable 81 milliGRAM(s) Oral daily  chlorhexidine 2% Cloths 1 Application(s) Topical <User Schedule>  dextrose 5%. 1000 milliLiter(s) IV Continuous <Continuous>  dextrose 5%. 1000 milliLiter(s) IV Continuous <Continuous>  dextrose 50% Injectable 25 Gram(s) IV Push once  dextrose 50% Injectable 12.5 Gram(s) IV Push once  dextrose 50% Injectable 25 Gram(s) IV Push once  dextrose Oral Gel 15 Gram(s) Oral once PRN  folic acid 1 milliGRAM(s) Oral daily  glucagon  Injectable 1 milliGRAM(s) IntraMuscular once  insulin glargine Injectable (LANTUS) 10 Unit(s) SubCutaneous every morning  insulin lispro (ADMELOG) corrective regimen sliding scale   SubCutaneous three times a day before meals  insulin lispro (ADMELOG) corrective regimen sliding scale   SubCutaneous at bedtime  metoprolol succinate  milliGRAM(s) Oral daily  nystatin    Suspension 123623 Unit(s) Oral three times a day  predniSONE   Tablet 30 milliGRAM(s) Oral daily  senna 2 Tablet(s) Oral at bedtime  tacrolimus 1.5 milliGRAM(s) Oral two times a day  trimethoprim   80 mG/sulfamethoxazole 400 mG 1 Tablet(s) Oral every 24 hours  valGANciclovir 450 milliGRAM(s) Oral daily      Vitals:  Vital Signs Last 24 Hours  T(C): 36.6 (23 @ 04:35), Max: 36.9 (06-15-23 @ 21:45)  HR: 91 (23 @ 04:35) (88 - 95)  BP: 97/65 (23 @ 04:35) (94/63 - 110/73)  RR: 18 (23 @ 04:35) (18 - 18)  SpO2: 96% (23 @ 04:35) (95% - 98%)    Weight in k.5 ( @ 06:47)    I&O's Summary    15 Kennedy 2023 07:01  -  2023 07:00  --------------------------------------------------------  IN: 720 mL / OUT: 0 mL / NET: 720 mL        Physical Exam  General: No distress. Comfortable.  Neck: JVP ~ 10- 12 cm   Chest: Clear to auscultation bilaterally  CV: Normal S1 and S2. No murmurs, rub, or gallops. Radial pulses normal.  Abdomen: Soft, non-distended, non-tender  Extremities: warm and perfused   Neurology: Alert and oriented times three.     Labs:                        9.2    5.22  )-----------( 301      ( 15 Kennedy 2023 06:06 )             31.1     06-15    137  |  102  |  41<H>  ----------------------------<  88  4.8   |  22  |  3.20<H>    Ca    8.5      15 Kennedy 2023 06:05  Phos  3.6     06-15  Mg     1.7     06-15    TPro  6.0  /  Alb  3.2<L>  /  TBili  0.5  /  DBili  x   /  AST  19  /  ALT  14  /  AlkPhos  54  06-15    PT/INR - ( 2023 16:13 )   PT: 18.4 sec;   INR: 1.59 ratio         PTT - ( 2023 16:13 )  PTT:38.3 sec  CARDIAC MARKERS ( 15 Kennedy 2023 06:06 )  x     / x     / x     / x     / 2.4 ng/mL

## 2023-06-16 NOTE — PROGRESS NOTE ADULT - PROBLEM SELECTOR PLAN 5
-CMV: due to worsening renal function plan to adjust Valcyte 450 PO M/TH daily   -Toxo/PCP: due to worsening renal function reduce bactrim SS M/W/F  -CAV: pravastatin 20mg daily, ASA 81mg   -Fungal: nystatin.

## 2023-06-16 NOTE — PROGRESS NOTE ADULT - PROBLEM SELECTOR PLAN 7
Hold losartan iso NORMAN. BPs soft on admission, CTM Hold losartan iso NORMAN. BPs soft on admission, CTM  - f/u BCx per cards recs

## 2023-06-16 NOTE — PROGRESS NOTE ADULT - NSPROGADDITIONALINFOA_GEN_ALL_CORE
Assessed pt/labs/meds and discussed plan of care with bedside RN/pt  Insulin adjustment   Discharge plan  Follow up care    [facility]  Department of Endocrinology, Diabetes and Metabolism   Can be reached via Teams  If no answer or after hours, please contact [phone].  office:  [phone] (M-F 8am-5pm)             [phone] (nights/weekends)   Can access [employer] coverage via sunrise/tools

## 2023-06-16 NOTE — PROGRESS NOTE ADULT - PROBLEM SELECTOR PLAN 3
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

## 2023-06-16 NOTE — PROGRESS NOTE ADULT - SUBJECTIVE AND OBJECTIVE BOX
Seen earlier today     Chief Complaint: Diabetes Mellitus follow up    INTERVAL HX: Resting in bed comfortably. " feel better and eating well". Tolerating POs. BGs variable 100- 200s with  on current insulin dose.     Review of Systems:  General: As above  GI: No nausea, vomiting  Endocrine: no  S&Sx of hypoglycemia    Allergies    No Known Allergies    Intolerances      MEDICATIONS  (STANDING):  dextrose 5%. 1000 milliLiter(s) (50 mL/Hr) IV Continuous <Continuous>  dextrose 5%. 1000 milliLiter(s) (100 mL/Hr) IV Continuous <Continuous>  dextrose 50% Injectable 25 Gram(s) IV Push once  dextrose 50% Injectable 12.5 Gram(s) IV Push once  dextrose 50% Injectable 25 Gram(s) IV Push once  glucagon  Injectable 1 milliGRAM(s) IntraMuscular once  insulin glargine Injectable (LANTUS) 10 Unit(s) SubCutaneous at bedtime  insulin lispro (ADMELOG) corrective regimen sliding scale   SubCutaneous three times a day before meals  insulin lispro (ADMELOG) corrective regimen sliding scale   SubCutaneous at bedtime  metoprolol succinate  milliGRAM(s) Oral daily  predniSONE   Tablet 30 milliGRAM(s) Oral daily  tacrolimus 1.5 milliGRAM(s) Oral two times a day  trimethoprim   80 mG/sulfamethoxazole 400 mG 1 Tablet(s) Oral every 24 hours  valGANciclovir 450 milliGRAM(s) Oral <User Schedule>        insulin glargine Injectable (LANTUS)   10 Unit(s) SubCutaneous (06-15-23 @ 22:06)    insulin lispro (ADMELOG) corrective regimen sliding scale   2 Unit(s) SubCutaneous (06-16-23 @ 13:05)   2 Unit(s) SubCutaneous (06-15-23 @ 16:35)    predniSONE   Tablet   30 milliGRAM(s) Oral (06-16-23 @ 06:30)        PHYSICAL EXAM:  VITALS: T(C): 36.6 (06-16-23 @ 12:58)  T(F): 97.8 (06-16-23 @ 12:58), Max: 98.4 (06-15-23 @ 21:45)  HR: 100 (06-16-23 @ 12:58) (89 - 105)  BP: 95/59 (06-16-23 @ 12:58) (93/62 - 110/73)  RR:  (18 - 18)  SpO2:  (95% - 98%)    GENERAL: male laying in bed, in NAD  Respiratory: Respirations unlabored   Extremities: Warm, no edema  NEURO: Alert , appropriate     LABS:  POCT Blood Glucose.: 220 mg/dL (06-16-23 @ 12:44)  POCT Blood Glucose.: 105 mg/dL (06-16-23 @ 08:33)  POCT Blood Glucose.: 140 mg/dL (06-15-23 @ 22:43)  POCT Blood Glucose.: 80 mg/dL (06-15-23 @ 21:41)  POCT Blood Glucose.: 237 mg/dL (06-15-23 @ 16:23)  POCT Blood Glucose.: 165 mg/dL (06-15-23 @ 11:26)  POCT Blood Glucose.: 172 mg/dL (06-15-23 @ 08:03)  POCT Blood Glucose.: 87 mg/dL (06-14-23 @ 19:11)  POCT Blood Glucose.: 79 mg/dL (06-14-23 @ 16:40)                          9.2    5.22  )-----------( 301      ( 15 Kennedy 2023 06:06 )             31.1     06-16    134<L>  |  100  |  49<H>  ----------------------------<  138<H>  4.5   |  20<L>  |  3.71<H>    Ca    8.1<L>      16 Jun 2023 10:36  Phos  3.2     06-16  Mg     1.7     06-16    TPro  6.0  /  Alb  3.2<L>  /  TBili  0.5  /  DBili  x   /  AST  19  /  ALT  14  /  AlkPhos  54  06-15    eGFR: 16 mL/min/1.73m2 (16 Jun 2023 10:36)    06-15 Chol 112 Direct LDL -- LDL calculated 41 HDL 45 Trig 128  Thyroid Function Tests:      A1C with Estimated Average Glucose Result: 8.6 % (05-24-23 @ 06:00)    Estimated Average Glucose: 200 mg/dL (05-24-23 @ 06:00)        Diet, DASH/TLC:   Sodium & Cholesterol Restricted  Consistent Carbohydrate Evening Snack (CSTCHOSN) (06-14-23 @ 21:31) [Active]

## 2023-06-16 NOTE — PROGRESS NOTE ADULT - PROBLEM SELECTOR PLAN 9
Diet: DASH with CC   Dispo: Pending clinical course, PT consulted  DVT: On Eliquis  Code: FULL    PPX given immunosuppresion:   CMV: valgancyclovir 450 daily  Toxo/PCP: bactrim 400-80 POD  CAV: pravastatin 20mg daily, ASA 81mg   Fungal: nystatin

## 2023-06-16 NOTE — PROGRESS NOTE ADULT - PROBLEM SELECTOR PLAN 5
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 Tacrolimus level daily before dose; admission level in specimen received. 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 Tacrolimus level daily before dose; admission level in specimen received.   - He is off cellcept chronically while on prednisone for hemolytic anemia  -Continue Bactrim, valganciclovir, nystatin  -C/w aspirin  - f/u repeat TTE  -Transplant following

## 2023-06-17 LAB
ANION GAP SERPL CALC-SCNC: 14 MMOL/L — SIGNIFICANT CHANGE UP (ref 5–17)
BUN SERPL-MCNC: 52 MG/DL — HIGH (ref 7–23)
CALCIUM SERPL-MCNC: 8.2 MG/DL — LOW (ref 8.4–10.5)
CHLORIDE SERPL-SCNC: 100 MMOL/L — SIGNIFICANT CHANGE UP (ref 96–108)
CO2 SERPL-SCNC: 20 MMOL/L — LOW (ref 22–31)
CREAT SERPL-MCNC: 3.69 MG/DL — HIGH (ref 0.5–1.3)
EGFR: 17 ML/MIN/1.73M2 — LOW
GLUCOSE BLDC GLUCOMTR-MCNC: 100 MG/DL — HIGH (ref 70–99)
GLUCOSE BLDC GLUCOMTR-MCNC: 116 MG/DL — HIGH (ref 70–99)
GLUCOSE BLDC GLUCOMTR-MCNC: 121 MG/DL — HIGH (ref 70–99)
GLUCOSE BLDC GLUCOMTR-MCNC: 126 MG/DL — HIGH (ref 70–99)
GLUCOSE BLDC GLUCOMTR-MCNC: 248 MG/DL — HIGH (ref 70–99)
GLUCOSE SERPL-MCNC: 211 MG/DL — HIGH (ref 70–99)
HCT VFR BLD CALC: 31.6 % — LOW (ref 39–50)
HGB BLD-MCNC: 9.2 G/DL — LOW (ref 13–17)
MAGNESIUM SERPL-MCNC: 2 MG/DL — SIGNIFICANT CHANGE UP (ref 1.6–2.6)
MCHC RBC-ENTMCNC: 29.1 GM/DL — LOW (ref 32–36)
MCHC RBC-ENTMCNC: 29.5 PG — SIGNIFICANT CHANGE UP (ref 27–34)
MCV RBC AUTO: 101.3 FL — HIGH (ref 80–100)
NRBC # BLD: 0 /100 WBCS — SIGNIFICANT CHANGE UP (ref 0–0)
PHOSPHATE SERPL-MCNC: 3.6 MG/DL — SIGNIFICANT CHANGE UP (ref 2.5–4.5)
PLATELET # BLD AUTO: 437 K/UL — HIGH (ref 150–400)
POTASSIUM SERPL-MCNC: 5.1 MMOL/L — SIGNIFICANT CHANGE UP (ref 3.5–5.3)
POTASSIUM SERPL-SCNC: 5.1 MMOL/L — SIGNIFICANT CHANGE UP (ref 3.5–5.3)
RBC # BLD: 3.12 M/UL — LOW (ref 4.2–5.8)
RBC # FLD: 17 % — HIGH (ref 10.3–14.5)
SODIUM SERPL-SCNC: 134 MMOL/L — LOW (ref 135–145)
TACROLIMUS SERPL-MCNC: 5.8 NG/ML — SIGNIFICANT CHANGE UP
WBC # BLD: 6.8 K/UL — SIGNIFICANT CHANGE UP (ref 3.8–10.5)
WBC # FLD AUTO: 6.8 K/UL — SIGNIFICANT CHANGE UP (ref 3.8–10.5)

## 2023-06-17 PROCEDURE — 99232 SBSQ HOSP IP/OBS MODERATE 35: CPT

## 2023-06-17 PROCEDURE — 99232 SBSQ HOSP IP/OBS MODERATE 35: CPT | Mod: GC

## 2023-06-17 PROCEDURE — 99233 SBSQ HOSP IP/OBS HIGH 50: CPT | Mod: GC

## 2023-06-17 RX ORDER — TACROLIMUS 5 MG/1
1 CAPSULE ORAL
Refills: 0 | Status: DISCONTINUED | OUTPATIENT
Start: 2023-06-17 | End: 2023-06-17

## 2023-06-17 RX ORDER — TACROLIMUS 5 MG/1
0.5 CAPSULE ORAL
Refills: 0 | Status: COMPLETED | OUTPATIENT
Start: 2023-06-17 | End: 2023-06-17

## 2023-06-17 RX ORDER — INSULIN LISPRO 100/ML
4 VIAL (ML) SUBCUTANEOUS
Refills: 0 | Status: DISCONTINUED | OUTPATIENT
Start: 2023-06-18 | End: 2023-06-20

## 2023-06-17 RX ORDER — INSULIN LISPRO 100/ML
2 VIAL (ML) SUBCUTANEOUS
Refills: 0 | Status: DISCONTINUED | OUTPATIENT
Start: 2023-06-18 | End: 2023-06-20

## 2023-06-17 RX ORDER — TACROLIMUS 5 MG/1
1 CAPSULE ORAL
Refills: 0 | Status: DISCONTINUED | OUTPATIENT
Start: 2023-06-18 | End: 2023-06-18

## 2023-06-17 RX ADMIN — INSULIN GLARGINE 8 UNIT(S): 100 INJECTION, SOLUTION SUBCUTANEOUS at 08:50

## 2023-06-17 RX ADMIN — SENNA PLUS 2 TABLET(S): 8.6 TABLET ORAL at 21:45

## 2023-06-17 RX ADMIN — Medication 30 MILLIGRAM(S): at 05:16

## 2023-06-17 RX ADMIN — Medication 2: at 12:18

## 2023-06-17 RX ADMIN — Medication 500000 UNIT(S): at 21:45

## 2023-06-17 RX ADMIN — APIXABAN 5 MILLIGRAM(S): 2.5 TABLET, FILM COATED ORAL at 05:16

## 2023-06-17 RX ADMIN — Medication 500000 UNIT(S): at 14:48

## 2023-06-17 RX ADMIN — Medication 1 MILLIGRAM(S): at 10:57

## 2023-06-17 RX ADMIN — TACROLIMUS 1.5 MILLIGRAM(S): 5 CAPSULE ORAL at 09:55

## 2023-06-17 RX ADMIN — APIXABAN 5 MILLIGRAM(S): 2.5 TABLET, FILM COATED ORAL at 17:14

## 2023-06-17 RX ADMIN — Medication 81 MILLIGRAM(S): at 10:57

## 2023-06-17 RX ADMIN — Medication 100 MILLIGRAM(S): at 05:16

## 2023-06-17 RX ADMIN — TACROLIMUS 0.5 MILLIGRAM(S): 5 CAPSULE ORAL at 21:45

## 2023-06-17 RX ADMIN — CHLORHEXIDINE GLUCONATE 1 APPLICATION(S): 213 SOLUTION TOPICAL at 08:53

## 2023-06-17 RX ADMIN — Medication 500000 UNIT(S): at 05:15

## 2023-06-17 RX ADMIN — Medication 30 MILLILITER(S): at 18:55

## 2023-06-17 NOTE — PROGRESS NOTE ADULT - SUBJECTIVE AND OBJECTIVE BOX
Diabetes Follow up note:    Chief complaint: T2DM w/steroid induced hyperglycemia. Hypoglycemia on admissioin    Interval Hx: BG values 100-mid 200s over past 24 hours. Pt seen at bedside. Reports feeling better overall, tolerating POs. Remains on Prednisone 30mg daily. Pt reports "trying to get more help at home".     Review of Systems:  General: denies pain  GI: Tolerating POs. Denies N/V/D/Abd pain  CV: Denies CP/SOB  ENDO: No S&Sx of hypoglycemia    MEDS:  insulin glargine Injectable (LANTUS) 8 Unit(s) SubCutaneous every morning  insulin lispro (ADMELOG) corrective regimen sliding scale   SubCutaneous <User Schedule>  insulin lispro (ADMELOG) corrective regimen sliding scale   SubCutaneous three times a day before meals  predniSONE   Tablet 30 milliGRAM(s) Oral daily    nystatin    Suspension 927715 Unit(s) Oral three times a day  valGANciclovir 450 milliGRAM(s) Oral <User Schedule>    Allergies    No Known Allergies      PE:  General: Male sitting in chair. NAD.   Vital Signs Last 24 Hrs  T(C): 36.3 (17 Jun 2023 12:32), Max: 36.8 (16 Jun 2023 21:34)  T(F): 97.3 (17 Jun 2023 12:32), Max: 98.2 (16 Jun 2023 21:34)  HR: 99 (17 Jun 2023 12:32) (99 - 112)  BP: 109/71 (17 Jun 2023 12:32) (109/71 - 116/72)  BP(mean): --  RR: 18 (17 Jun 2023 12:32) (18 - 18)  SpO2: 97% (17 Jun 2023 12:32) (93% - 98%)    Parameters below as of 17 Jun 2023 12:32  Patient On (Oxygen Delivery Method): room air      Abd: Soft, NT,ND,   Extremities: Warm  Neuro: A&O X3    LABS:  POCT Blood Glucose.: 248 mg/dL (06-17-23 @ 11:52)  POCT Blood Glucose.: 121 mg/dL (06-17-23 @ 08:33)  POCT Blood Glucose.: 100 mg/dL (06-17-23 @ 03:22)  POCT Blood Glucose.: 146 mg/dL (06-16-23 @ 21:37)  POCT Blood Glucose.: 164 mg/dL (06-16-23 @ 18:54)  POCT Blood Glucose.: 187 mg/dL (06-16-23 @ 16:36)  POCT Blood Glucose.: 220 mg/dL (06-16-23 @ 12:44)  POCT Blood Glucose.: 105 mg/dL (06-16-23 @ 08:33)  POCT Blood Glucose.: 140 mg/dL (06-15-23 @ 22:43)  POCT Blood Glucose.: 80 mg/dL (06-15-23 @ 21:41)  POCT Blood Glucose.: 237 mg/dL (06-15-23 @ 16:23)  POCT Blood Glucose.: 165 mg/dL (06-15-23 @ 11:26)  POCT Blood Glucose.: 172 mg/dL (06-15-23 @ 08:03)  POCT Blood Glucose.: 87 mg/dL (06-14-23 @ 19:11)  POCT Blood Glucose.: 79 mg/dL (06-14-23 @ 16:40)                            9.2    6.80  )-----------( 437      ( 17 Jun 2023 10:10 )             31.6       06-17    134<L>  |  100  |  52<H>  ----------------------------<  211<H>  5.1   |  20<L>  |  3.69<H>    eGFR: 17<L>    Ca    8.2<L>      06-17  Mg     2.0     06-17  Phos  3.6     06-17    TPro  6.0  /  Alb  3.2<L>  /  TBili  0.5  /  DBili  x   /  AST  19  /  ALT  14  /  AlkPhos  54  06-15      A1C with Estimated Average Glucose Result: 8.6 % (05-24-23 @ 06:00)  A1C with Estimated Average Glucose Result: 4.7 % (02-17-23 @ 11:59)          Contact number: deanne [phone] or [phone]

## 2023-06-17 NOTE — PROGRESS NOTE ADULT - ASSESSMENT
73M with hx of nonischemic cardiomyopathy s/p HM2 LVAD in June 2017 complicated possible pump thrombosis who underwent OHT 2/23/18 with hepatitis C donor, hx of hemolytic anemia (treated with prednisone and Rituximab), LAD-RV fistula (Unable to be closed) was recently admitted to [facility] (5/25-6/2) volume overloaded now presents after being found confused/alter at home. Upon arrival in the ER he was found to have FS in the 30s-50s and labs revealed worsening NORMAN with creatinine up to 3.2. Since being admitted he has been hypotensive with SBP sitting in the 80s and is asymptomatic.  His renal function continues to worsen, likely related to dehydration. Currently holding all diuretics and adjust immunosuppression Will need to determine safe dispo plan.

## 2023-06-17 NOTE — PROGRESS NOTE ADULT - SUBJECTIVE AND OBJECTIVE BOX
Interval History: Interval events reviewed     Medications:  acetaminophen     Tablet .. 650 milliGRAM(s) Oral every 6 hours PRN  apixaban 5 milliGRAM(s) Oral every 12 hours  aspirin  chewable 81 milliGRAM(s) Oral daily  chlorhexidine 2% Cloths 1 Application(s) Topical <User Schedule>  dextrose 5%. 1000 milliLiter(s) IV Continuous <Continuous>  dextrose 5%. 1000 milliLiter(s) IV Continuous <Continuous>  dextrose 50% Injectable 25 Gram(s) IV Push once  dextrose 50% Injectable 12.5 Gram(s) IV Push once  dextrose 50% Injectable 25 Gram(s) IV Push once  dextrose Oral Gel 15 Gram(s) Oral once PRN  folic acid 1 milliGRAM(s) Oral daily  glucagon  Injectable 1 milliGRAM(s) IntraMuscular once  insulin glargine Injectable (LANTUS) 8 Unit(s) SubCutaneous every morning  insulin lispro (ADMELOG) corrective regimen sliding scale   SubCutaneous three times a day before meals  insulin lispro (ADMELOG) corrective regimen sliding scale   SubCutaneous <User Schedule>  metoprolol succinate  milliGRAM(s) Oral daily  nystatin    Suspension 613104 Unit(s) Oral three times a day  predniSONE   Tablet 30 milliGRAM(s) Oral daily  senna 2 Tablet(s) Oral at bedtime  tacrolimus 1.5 milliGRAM(s) Oral <User Schedule>  valGANciclovir 450 milliGRAM(s) Oral <User Schedule>    Vitals:  T(C): 36.4 (23 @ 04:53), Max: 36.8 (23 @ 21:34)  HR: 112 (23 @ 04:53) (100 - 112)  BP: 109/72 (23 @ 04:53) (95/59 - 116/72)  RR: 18 (23 @ 04:53) (18 - 18)  SpO2: 97% (23 @ 04:53) (93% - 98%)      Daily     Daily Weight in k.1 (2023 06:51)    Weight (kg): 72.5 ( @ 06:47)    I&O's Summary    2023 07:01  -  2023 07:00  --------------------------------------------------------  IN: 1080 mL / OUT: 200 mL / NET: 880 mL        Physical Exam  General: No distress. Comfortable.  Neck: JVP ~ 10- 12 cm   Chest: Clear to auscultation bilaterally  CV: Normal S1 and S2. No murmurs, rub, or gallops. Radial pulses normal.  Abdomen: Soft, non-distended, non-tender  Extremities: warm and perfused   Neurology: Alert and oriented times three.       Labs:        134<L>  |  100  |  49<H>  ----------------------------<  138<H>  4.5   |  20<L>  |  3.71<H>    Ca    8.1<L>      2023 10:36  Phos  3.2       Mg     1.7

## 2023-06-17 NOTE — PROGRESS NOTE ADULT - SUBJECTIVE AND OBJECTIVE BOX
[facility] DIVISION OF KIDNEY DISEASES AND HYPERTENSION -- FOLLOW UP NOTE  --------------------------------------------------------------------------------  Chief Complaint: NORMAN on CKD    24 hour events/subjective: Pt. was seen and examined today, not in distress. Denies fever, chills, SOB, CP, dysuria. Scr was elevated at 3.7 on 6/16/23. No BMP available today to review.   Reports that his appetite has improved today.       PAST HISTORY  --------------------------------------------------------------------------------  No significant changes to PMH, PSH, FHx, SHx, unless otherwise noted    ALLERGIES & MEDICATIONS  --------------------------------------------------------------------------------  Allergies    No Known Allergies    Intolerances      Standing Inpatient Medications  apixaban 5 milliGRAM(s) Oral every 12 hours  aspirin  chewable 81 milliGRAM(s) Oral daily  chlorhexidine 2% Cloths 1 Application(s) Topical <User Schedule>  dextrose 5%. 1000 milliLiter(s) IV Continuous <Continuous>  dextrose 5%. 1000 milliLiter(s) IV Continuous <Continuous>  dextrose 50% Injectable 25 Gram(s) IV Push once  dextrose 50% Injectable 12.5 Gram(s) IV Push once  dextrose 50% Injectable 25 Gram(s) IV Push once  folic acid 1 milliGRAM(s) Oral daily  glucagon  Injectable 1 milliGRAM(s) IntraMuscular once  insulin glargine Injectable (LANTUS) 8 Unit(s) SubCutaneous every morning  insulin lispro (ADMELOG) corrective regimen sliding scale   SubCutaneous three times a day before meals  insulin lispro (ADMELOG) corrective regimen sliding scale   SubCutaneous <User Schedule>  metoprolol succinate  milliGRAM(s) Oral daily  nystatin    Suspension 916278 Unit(s) Oral three times a day  predniSONE   Tablet 30 milliGRAM(s) Oral daily  senna 2 Tablet(s) Oral at bedtime  tacrolimus 1.5 milliGRAM(s) Oral <User Schedule>  valGANciclovir 450 milliGRAM(s) Oral <User Schedule>    PRN Inpatient Medications  acetaminophen     Tablet .. 650 milliGRAM(s) Oral every 6 hours PRN  dextrose Oral Gel 15 Gram(s) Oral once PRN      REVIEW OF SYSTEMS    All other systems were reviewed and are negative, except as noted.    VITALS/PHYSICAL EXAM  --------------------------------------------------------------------------------  T(C): 36.4 (06-17-23 @ 04:53), Max: 36.8 (06-16-23 @ 21:34)  HR: 112 (06-17-23 @ 04:53) (100 - 112)  BP: 109/72 (06-17-23 @ 04:53) (95/59 - 116/72)  RR: 18 (06-17-23 @ 04:53) (18 - 18)  SpO2: 98% (06-17-23 @ 09:18) (93% - 98%)  Wt(kg): --    Weight (kg): 72.5 (06-16-23 @ 06:47)      06-16-23 @ 07:01  -  06-17-23 @ 07:00  --------------------------------------------------------  IN: 1080 mL / OUT: 200 mL / NET: 880 mL        Physical Exam:  	Gen: NAD  	HEENT: MMM  	Pulm: CTA B/L  	CV: S1S2  	Abd: Soft, +BS   	Ext: No LE edema B/L  	Neuro: Awake  	Skin: Warm and dry    LABS/STUDIES  --------------------------------------------------------------------------------    134  |  100  |  49  ----------------------------<  138      [06-16-23 @ 10:36]  4.5   |  20  |  3.71        Ca     8.1     [06-16-23 @ 10:36]      Mg     1.7     [06-16-23 @ 10:36]      Phos  3.2     [06-16-23 @ 10:36]            Creatinine Trend:  SCr 3.71 [06-16 @ 10:36]  SCr 3.20 [06-15 @ 06:05]  SCr 3.19 [06-14 @ 16:13]  SCr 2.19 [06-02 @ 09:23]  SCr 2.02 [06-01 @ 08:57]    Urinalysis - [06-14-23 @ 20:23]      Color Light Yellow / Appearance Clear / SG 1.013 / pH 5.5      Gluc Trace / Ketone Negative  / Bili Negative / Urobili Negative       Blood Negative / Protein Negative / Leuk Est Negative / Nitrite Negative      RBC  / WBC  / Hyaline  / Gran  / Sq Epi  / Non Sq Epi  / Bacteria     Urine Creatinine 56      [06-15-23 @ 01:34]  Urine Protein <7      [06-15-23 @ 01:34]  Urine Sodium 97      [06-15-23 @ 01:34]  Urine Urea Nitrogen 216      [06-15-23 @ 01:34]  Urine Potassium 14      [06-15-23 @ 01:34]  Urine Osmolality 308      [06-15-23 @ 01:33]    HbA1c 4.7      [11-07-18 @ 23:18]  TSH 1.06      [03-03-23 @ 15:09]  Lipid: chol 112, , HDL 45, LDL --      [06-15-23 @ 06:03]

## 2023-06-17 NOTE — PROGRESS NOTE ADULT - PROBLEM SELECTOR PLAN 1
Pt. with hemodynamically mediated NORMAN on CKD in the setting of hypotension and supra therapeutic tacrolimus levels (however most recent tacro level does not seem to be an accurate trough level). On review of Lianet FALK/[facility] pt noted to have a baseline SCr ranging between 1.8 -2 with most recent SCr from his previous hospitalization on 6/2 of 2.19. on admission Scr was elevated to 3.19 and was elevated at 3.7 on 6/16/23; pending AM labs. Pt noted to have SBP in the 90's. UA bland and urine lytes reviewed. Diuretics currently held. Consider dose adjustment of tacrolimus as per levels as per transplant cardiology, Pt with low BP readings. Pt likely with ATN. Check BMP. Please monitor for urinary retention with serial bladder scans. Monitor labs and urine output. Avoid nephrotoxins. Dose medications as per eGFR.    If any questions, please feel free to contact me     Yaya Walters  Nephrology Fellow  [facility] Pager: [phone]

## 2023-06-17 NOTE — PROGRESS NOTE ADULT - ATTENDING COMMENTS
meds: nystatin, valcyte, toprol 100, eloquis bid, asa, insulin, prograf 1.5 bid, pred 30 off diuretics.   -110, 95/-116/ 176, (174.8)  06-17    134<L>  |  100  |  52<H>  ----------------------------<  211<H>  5.1   |  20<L>  |  3.69<H>  Cr 3.69 3.71     Ca    8.2<L>      17 Jun 2023 10:10  Phos  3.6     06-17  Mg     2.0     06-17                          9.2    6.80  )-----------( 437      ( 17 Jun 2023 10:10 )             31.6   prograf P   patient clinically improved. ambulating.   continue to follow off diuretics. daily standing weights.   todays prograf drawn late (10am)   Marvin Campbell

## 2023-06-17 NOTE — PROGRESS NOTE ADULT - SUBJECTIVE AND OBJECTIVE BOX
Rusty Carpenter MD  Internal Medicine, PGY-2  Please Contact via TEAMS    SUBJECTIVE / OVERNIGHT EVENTS:  - Pt seen and examined at bedside  - Patient refusing labwork to be done by nursing staff, only allowing phlebotomy to draw labs. Phlebotomy unable to obtain labs until later this AM due to schedule. WILL LIKELY NEED TACROLIMUS DOSE RESCHEDULING DUE TO THIS.       MEDICATIONS  (STANDING):  apixaban 5 milliGRAM(s) Oral every 12 hours  aspirin  chewable 81 milliGRAM(s) Oral daily  chlorhexidine 2% Cloths 1 Application(s) Topical <User Schedule>  dextrose 5%. 1000 milliLiter(s) (50 mL/Hr) IV Continuous <Continuous>  dextrose 5%. 1000 milliLiter(s) (100 mL/Hr) IV Continuous <Continuous>  dextrose 50% Injectable 25 Gram(s) IV Push once  dextrose 50% Injectable 12.5 Gram(s) IV Push once  dextrose 50% Injectable 25 Gram(s) IV Push once  folic acid 1 milliGRAM(s) Oral daily  glucagon  Injectable 1 milliGRAM(s) IntraMuscular once  insulin glargine Injectable (LANTUS) 8 Unit(s) SubCutaneous every morning  insulin lispro (ADMELOG) corrective regimen sliding scale   SubCutaneous three times a day before meals  insulin lispro (ADMELOG) corrective regimen sliding scale   SubCutaneous <User Schedule>  metoprolol succinate  milliGRAM(s) Oral daily  nystatin    Suspension 537379 Unit(s) Oral three times a day  predniSONE   Tablet 30 milliGRAM(s) Oral daily  senna 2 Tablet(s) Oral at bedtime  tacrolimus 1.5 milliGRAM(s) Oral <User Schedule>  valGANciclovir 450 milliGRAM(s) Oral <User Schedule>    MEDICATIONS  (PRN):  acetaminophen     Tablet .. 650 milliGRAM(s) Oral every 6 hours PRN Temp greater or equal to 38C (100.4F), Mild Pain (1 - 3)  dextrose Oral Gel 15 Gram(s) Oral once PRN Blood Glucose LESS THAN 70 milliGRAM(s)/deciliter        06-16-23 @ 07:01  -  06-17-23 @ 07:00  --------------------------------------------------------  IN: 1080 mL / OUT: 200 mL / NET: 880 mL        PHYSICAL EXAM:  Vital Signs Last 24 Hrs  T(C): 36.4 (17 Jun 2023 04:53), Max: 36.8 (16 Jun 2023 21:34)  T(F): 97.6 (17 Jun 2023 04:53), Max: 98.2 (16 Jun 2023 21:34)  HR: 112 (17 Jun 2023 04:53) (100 - 112)  BP: 109/72 (17 Jun 2023 04:53) (93/62 - 116/72)  BP(mean): --  RR: 18 (17 Jun 2023 04:53) (18 - 18)  SpO2: 97% (17 Jun 2023 04:53) (93% - 98%)    Parameters below as of 17 Jun 2023 04:53  Patient On (Oxygen Delivery Method): room air        CAPILLARY BLOOD GLUCOSE      POCT Blood Glucose.: 100 mg/dL (17 Jun 2023 03:22)  POCT Blood Glucose.: 146 mg/dL (16 Jun 2023 21:37)  POCT Blood Glucose.: 164 mg/dL (16 Jun 2023 18:54)  POCT Blood Glucose.: 187 mg/dL (16 Jun 2023 16:36)  POCT Blood Glucose.: 220 mg/dL (16 Jun 2023 12:44)  POCT Blood Glucose.: 105 mg/dL (16 Jun 2023 08:33)    I&O's Summary    16 Jun 2023 07:01  -  17 Jun 2023 07:00  --------------------------------------------------------  IN: 1080 mL / OUT: 200 mL / NET: 880 mL        CONSTITUTIONAL: NAD, well-developed  RESPIRATORY: Normal respiratory effort; lungs are clear to auscultation bilaterally  CARDIOVASCULAR: Regular rate and rhythm, normal S1 and S2, no murmur/rub/gallop; No lower extremity edema; Peripheral pulses are 2+ bilaterally  ABDOMEN: Nontender to palpation, normoactive bowel sounds, no rebound/guarding; No hepatosplenomegaly  MUSCLOSKELETAL: no clubbing or cyanosis of digits; no joint swelling or tenderness to palpation  PSYCH: A+O to person, place, and time; affect appropriate    LABS:    06-16    134<L>  |  100  |  49<H>  ----------------------------<  138<H>  4.5   |  20<L>  |  3.71<H>    Ca    8.1<L>      16 Jun 2023 10:36  Phos  3.2     06-16  Mg     1.7     06-16                  IMAGING:    [X] All pertinent imaging reviewed by me

## 2023-06-17 NOTE — PROGRESS NOTE ADULT - ATTENDING COMMENTS
Patient seen and examined. Plan as discussed w/ Dr. Carpenter: monitor FS and serum glucose closely - improved, appreciate endocrinology's recommendations for insulin regimen; NORMAN persists, f/u nephrology's recommendations and discuss them w/ transplant cardiology -- and closely monitor Tacrolimus level (but patient refusing AM labs, wants to wait for phlebotomy - thus, levels may be hard to interpret -- for now, will dose AM dose of Tacrolimus immediately after blood draw, and per transplant pharmacist will continue with 8pm dose as is for now); continue PPX medications, renally dosed. Patient seen and examined. Plan as discussed w/ Dr. Carpenter: monitor FS and serum glucose closely - improved, appreciate endocrinology's recommendations for insulin regimen; NORMAN progressing, patient still w/ adaquate UOP; f/u nephrology's recommendations and discuss them w/ transplant cardiology -- and closely monitor Tacrolimus level (but patient refusing AM labs, wants to wait for phlebotomy - thus, levels may be hard to interpret -- for now, will dose AM dose of Tacrolimus immediately after blood draw, and per transplant pharmacist will continue with 8pm dose as is for now); continue PPX medications, renally dosed. Patient seen and examined. Plan as discussed w/ Dr. Carpenter: monitor FS and serum glucose closely - improved, appreciate endocrinology's recommendations for insulin regimen; NORMAN progressing, monitoring patient's UOP; f/u nephrology's recommendations and discuss them w/ transplant cardiology; closely monitor Tacrolimus level (but patient refusing AM labs, wants to wait for phlebotomy - thus, levels may be hard to interpret -- for now, will dose AM dose of Tacrolimus immediately after blood draw, and per transplant pharmacist will continue with 8pm dose as is for now -- will review w/ transplant cardiology); continue PPX medications, renally dosed.

## 2023-06-17 NOTE — PROGRESS NOTE ADULT - ATTENDING COMMENTS
# NORMAN on CKD - secondary to ATN (volume depletion and relative hypotention) and elevated Tac level. Serum creatinine rising, but no uremic symptoms. No immediate indication for dialysis. Pending labs today.       # Medication toxicity monitoring: medication dose reviewed. Check trough tac lavel.    The rest of the recommendations as per fellow's note.    Umm Fang MD  Attending Nephrologist  [phone] or via [place]

## 2023-06-17 NOTE — PROGRESS NOTE ADULT - PROBLEM SELECTOR PLAN 2
Admitted with Cr of 3 .19, normal range 1.5-2.1. CKD IV. FeUrea 30.1% --> indicative of pre-renal disease however Tacrolimus levels supratherapeutic (but drawn during wrong time) and may be contributor to NORMAN.   - Renal U/S w/ medullary pyramid in addition to b/l increased echogenicity   - hold home bumex 1 daily pending cards/nephro, encouraging PO intake   - Tacro dosed to be drawn 30 mins prior to dose --> emphasizing Tacro not be given until dose drawn for accurate measurement. May likely need to readjust tacro dosing to different time to accommodate patient need for phlebotomy to draw labs   - Avoid nephrotoxic agents  - cardiorenal and transplant cards following

## 2023-06-17 NOTE — PROGRESS NOTE ADULT - PROBLEM SELECTOR PLAN 1
Pt admitted for tremors and sweats likely 2/2 hypoglycemia iso poor PO intake  -Pt likely needs titration of insulin regimen given decreased diet at home.   - Reportedly on home glargine 20 daily; admelog 40 pre-breakfast/14 pre-lunch/4 pre-dinner --> per pharm team and records, on admelog 3 pre breakfast  - C/w lantus 10 and ISS. Recalculate premeal based on 24 hour ISS   - DASH diet with CC  - endocrine following, will appreciate recs

## 2023-06-17 NOTE — PROGRESS NOTE ADULT - ASSESSMENT
73 year old male, with past history of HTN, HLD and T2DM on insulin, complicated by steroid-induced hyperglycemia, presents with symptomatic hypoglycemia in the setting of reduced oral intake and possibly missing steroid doses.  Endocrine Following for hypoglycemia. BG Goal 100-180mg/dl.     Home regiment: lantus 20 units in the morning 9am, admelog 30/14/4   HgbA1C 8.6 %( 5/24/23)

## 2023-06-17 NOTE — PROGRESS NOTE ADULT - PROBLEM SELECTOR PLAN 5
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 Tacrolimus level daily before dose; admission level in specimen received.   - He is off cellcept chronically while on prednisone for hemolytic anemia  -Continue Bactrim, valganciclovir, nystatin  -C/w aspirin  - f/u repeat TTE  -Transplant following

## 2023-06-17 NOTE — PROGRESS NOTE ADULT - PROBLEM SELECTOR PLAN 1
-test BG AC/HS  -c/w Lantus 8 units QAM  -Start Admelog 4 units w/breakfast and 2 units w/lunch  -c/w Admelog low correction scale AC and low HS scale  -cons carb diet  -On Prednisone 30mg>notify endocrine if this dose adjusted.  Discharge planning :   - Likely discharge on basal  & bolus. Doses will be determined based on BG trend, insulin requiement, and oral intake.   - Suggested to God brother for patient to take oral medications when the home aid is present or when he is present to ensure appropriate administration. Needs assistance at home  - Ophthalmology and podiatry follow up outpatient   - Has follow up outpatient appointment with Endocrine Dr. Clemens Aug 15th [address] [facility] [address] [place].

## 2023-06-17 NOTE — PROGRESS NOTE ADULT - PROBLEM SELECTOR PLAN 3
Was on pravastatin 20mg   Continue statin therapy if no contraindication. Per primary team.        Can be reached via TEAMS/pager: [phone]   office:  [phone] (M-F 9a-5pm)               [phone] (nights/weekends)   Can access Amion coverage via sunrise/tools

## 2023-06-18 ENCOUNTER — TRANSCRIPTION ENCOUNTER (OUTPATIENT)
Age: 73
End: 2023-06-18

## 2023-06-18 LAB
ANION GAP SERPL CALC-SCNC: 12 MMOL/L — SIGNIFICANT CHANGE UP (ref 5–17)
BUN SERPL-MCNC: 54 MG/DL — HIGH (ref 7–23)
CALCIUM SERPL-MCNC: 8.2 MG/DL — LOW (ref 8.4–10.5)
CHLORIDE SERPL-SCNC: 103 MMOL/L — SIGNIFICANT CHANGE UP (ref 96–108)
CO2 SERPL-SCNC: 21 MMOL/L — LOW (ref 22–31)
CREAT SERPL-MCNC: 3.23 MG/DL — HIGH (ref 0.5–1.3)
EGFR: 19 ML/MIN/1.73M2 — LOW
GLUCOSE BLDC GLUCOMTR-MCNC: 116 MG/DL — HIGH (ref 70–99)
GLUCOSE BLDC GLUCOMTR-MCNC: 127 MG/DL — HIGH (ref 70–99)
GLUCOSE BLDC GLUCOMTR-MCNC: 140 MG/DL — HIGH (ref 70–99)
GLUCOSE BLDC GLUCOMTR-MCNC: 159 MG/DL — HIGH (ref 70–99)
GLUCOSE BLDC GLUCOMTR-MCNC: 98 MG/DL — SIGNIFICANT CHANGE UP (ref 70–99)
GLUCOSE SERPL-MCNC: 201 MG/DL — HIGH (ref 70–99)
HCT VFR BLD CALC: 30.3 % — LOW (ref 39–50)
HGB BLD-MCNC: 9 G/DL — LOW (ref 13–17)
MAGNESIUM SERPL-MCNC: 2.3 MG/DL — SIGNIFICANT CHANGE UP (ref 1.6–2.6)
MCHC RBC-ENTMCNC: 29.7 GM/DL — LOW (ref 32–36)
MCHC RBC-ENTMCNC: 29.7 PG — SIGNIFICANT CHANGE UP (ref 27–34)
MCV RBC AUTO: 100 FL — SIGNIFICANT CHANGE UP (ref 80–100)
NRBC # BLD: 1 /100 WBCS — HIGH (ref 0–0)
PHOSPHATE SERPL-MCNC: 2.5 MG/DL — SIGNIFICANT CHANGE UP (ref 2.5–4.5)
PLATELET # BLD AUTO: 442 K/UL — HIGH (ref 150–400)
POTASSIUM SERPL-MCNC: 5.4 MMOL/L — HIGH (ref 3.5–5.3)
POTASSIUM SERPL-SCNC: 5.4 MMOL/L — HIGH (ref 3.5–5.3)
RBC # BLD: 3.03 M/UL — LOW (ref 4.2–5.8)
RBC # FLD: 17.2 % — HIGH (ref 10.3–14.5)
SODIUM SERPL-SCNC: 136 MMOL/L — SIGNIFICANT CHANGE UP (ref 135–145)
TACROLIMUS SERPL-MCNC: 4.9 NG/ML — SIGNIFICANT CHANGE UP
WBC # BLD: 6.78 K/UL — SIGNIFICANT CHANGE UP (ref 3.8–10.5)
WBC # FLD AUTO: 6.78 K/UL — SIGNIFICANT CHANGE UP (ref 3.8–10.5)

## 2023-06-18 PROCEDURE — 99232 SBSQ HOSP IP/OBS MODERATE 35: CPT | Mod: GC

## 2023-06-18 PROCEDURE — 99233 SBSQ HOSP IP/OBS HIGH 50: CPT

## 2023-06-18 RX ORDER — TACROLIMUS 5 MG/1
1.5 CAPSULE ORAL
Refills: 0 | Status: DISCONTINUED | OUTPATIENT
Start: 2023-06-18 | End: 2023-06-20

## 2023-06-18 RX ORDER — TACROLIMUS 5 MG/1
1 CAPSULE ORAL
Refills: 0 | Status: DISCONTINUED | OUTPATIENT
Start: 2023-06-19 | End: 2023-06-20

## 2023-06-18 RX ADMIN — INSULIN GLARGINE 8 UNIT(S): 100 INJECTION, SOLUTION SUBCUTANEOUS at 08:01

## 2023-06-18 RX ADMIN — Medication 500000 UNIT(S): at 05:07

## 2023-06-18 RX ADMIN — Medication 2 UNIT(S): at 12:15

## 2023-06-18 RX ADMIN — Medication 1 MILLIGRAM(S): at 11:12

## 2023-06-18 RX ADMIN — Medication 500000 UNIT(S): at 14:57

## 2023-06-18 RX ADMIN — Medication 500000 UNIT(S): at 20:51

## 2023-06-18 RX ADMIN — APIXABAN 5 MILLIGRAM(S): 2.5 TABLET, FILM COATED ORAL at 17:22

## 2023-06-18 RX ADMIN — APIXABAN 5 MILLIGRAM(S): 2.5 TABLET, FILM COATED ORAL at 05:08

## 2023-06-18 RX ADMIN — Medication 100 MILLIGRAM(S): at 05:08

## 2023-06-18 RX ADMIN — Medication 30 MILLIGRAM(S): at 05:08

## 2023-06-18 RX ADMIN — TACROLIMUS 1.5 MILLIGRAM(S): 5 CAPSULE ORAL at 20:51

## 2023-06-18 RX ADMIN — Medication 4 UNIT(S): at 08:01

## 2023-06-18 RX ADMIN — TACROLIMUS 1 MILLIGRAM(S): 5 CAPSULE ORAL at 09:19

## 2023-06-18 RX ADMIN — CHLORHEXIDINE GLUCONATE 1 APPLICATION(S): 213 SOLUTION TOPICAL at 08:04

## 2023-06-18 RX ADMIN — SENNA PLUS 2 TABLET(S): 8.6 TABLET ORAL at 20:51

## 2023-06-18 RX ADMIN — Medication 81 MILLIGRAM(S): at 11:11

## 2023-06-18 NOTE — DISCHARGE NOTE PROVIDER - NSDCMRMEDTOKEN_GEN_ALL_CORE_FT
Admelog 100 units/mL injectable solution: 3 injectable once a day before breakfast  Admelog 100 units/mL injectable solution: 14 unit(s) injectable once a day (with lunch)  Admelog 100 units/mL injectable solution: 4 unit(s) injectable once a day  apixaban 5 mg oral tablet: 1 tab(s) orally every 12 hours  aspirin 81 mg oral tablet: 1 tab(s) orally once a day  bumetanide 1 mg oral tablet: 1 tab(s) orally once a day  febuxostat 40 mg oral tablet: 1 tab(s) orally once a day  insulin glargine 100 units/mL subcutaneous solution: 20 unit(s) subcutaneous once a day  metoprolol succinate 200 mg oral tablet, extended release: 1 tab(s) orally once a day  nystatin 100,000 units/mL oral suspension: 5 milliliter(s) orally 3 times a day  ocular lubricant ophthalmic solution: 1 drop(s) to each affected eye 4 times a day  pantoprazole 40 mg oral delayed release tablet: 1 tab(s) orally once a day (before a meal)  pravastatin 20 mg oral tablet: 1 tab(s) orally once a day  predniSONE 10 mg oral tablet: 3 tab(s) orally once a day  sulfamethoxazole-trimethoprim 400 mg-80 mg oral tablet: 1 tab(s) orally every 24 hours  tacrolimus 1 mg oral capsule: 2 cap(s) orally 2 times a day  valGANciclovir 450 mg oral tablet: 1 tab(s) orally once a day   Admelog 100 units/mL injectable solution: 3 injectable once a day before breakfast  Admelog 100 units/mL injectable solution: 14 unit(s) injectable once a day (with lunch)  Admelog 100 units/mL injectable solution: 4 unit(s) injectable once a day  apixaban 5 mg oral tablet: 1 tab(s) orally every 12 hours  aspirin 81 mg oral tablet, chewable: 1 tab(s) orally once a day  bumetanide 1 mg oral tablet: 1 tab(s) orally once a day  febuxostat 40 mg oral tablet: 1 tab(s) orally once a day  insulin glargine 100 units/mL subcutaneous solution: 20 unit(s) subcutaneous once a day  metoprolol succinate 100 mg oral tablet, extended release: 1 tab(s) orally once a day  nystatin 100,000 units/mL oral suspension: 5 milliliter(s) orally 3 times a day  ocular lubricant ophthalmic solution: 1 drop(s) to each affected eye 4 times a day  pantoprazole 40 mg oral delayed release tablet: 1 tab(s) orally once a day (before a meal)  pravastatin 20 mg oral tablet: 1 tab(s) orally once a day  predniSONE 10 mg oral tablet: 3 tab(s) orally once a day  sulfamethoxazole-trimethoprim 400 mg-80 mg oral tablet: 1 tab(s) orally  tacrolimus 1 mg oral capsule: 2 cap(s) orally  valGANciclovir 450 mg oral tablet: 1 tab(s) orally   Admelog 100 units/mL injectable solution: 7 unit(s) injectable once a day before breakfast  apixaban 5 mg oral tablet: 1 tab(s) orally every 12 hours  aspirin 81 mg oral tablet, chewable: 1 tab(s) orally once a day  bumetanide 1 mg oral tablet: 1 tab(s) orally once a day  febuxostat 40 mg oral tablet: 1 tab(s) orally once a day  insulin glargine 100 units/mL subcutaneous solution: 8 unit(s) subcutaneous once a day (in the morning)  metoprolol succinate 100 mg oral tablet, extended release: 1 tab(s) orally once a day  nystatin 100,000 units/mL oral suspension: 5 milliliter(s) orally 3 times a day  ocular lubricant ophthalmic solution: 1 drop(s) to each affected eye 4 times a day  pantoprazole 40 mg oral delayed release tablet: 1 tab(s) orally once a day (before a meal)  pravastatin 20 mg oral tablet: 1 tab(s) orally once a day  predniSONE 10 mg oral tablet: 3 tab(s) orally once a day  sodium zirconium cyclosilicate: 5 gram(s) orally once a day  sulfamethoxazole-trimethoprim 400 mg-80 mg oral tablet: 1 tab(s) orally 3 times a week  tacrolimus 1 mg oral capsule: 3 cap(s) orally 2 times a day  valGANciclovir 450 mg oral tablet: 1 tab(s) orally 2 times a week

## 2023-06-18 NOTE — PROGRESS NOTE ADULT - ATTENDING COMMENTS
# NORMAN on CKD - secondary to ATN (volume depletion and relative hypotension) and elevated Tac level. Serum creatinine reached a plateau of 3.7mg/dL. No indication for dialysis. We will continue to monitor kidney function.     The rest of the recommendations as per fellow's note.    Umm Fang MD  Attending Nephrologist  [phone] or via [employer] .

## 2023-06-18 NOTE — PROGRESS NOTE ADULT - SUBJECTIVE AND OBJECTIVE BOX
***************************************************************  Lio Talley, PGY1  Internal Medicine   TEAMS Preferred  ***************************************************************    BILLY RUSSELL  73y  MRN: [id number]  06-14-23 (4d)    Patient is a 73y old  Male who presents with a chief complaint of Hypoglycemia (17 Jun 2023 14:17)      SUBJECTIVE / OVERNIGHT EVENTS:   No acute overnight events. Pt seen and examined at bedside. Denies fevers, chills, CP, SOB, Abdominal pain, N/V, Constipation, Diarrhea. Last BM     12 Point ROS negative with the exception of the above    MEDICATIONS  (STANDING):  apixaban 5 milliGRAM(s) Oral every 12 hours  aspirin  chewable 81 milliGRAM(s) Oral daily  chlorhexidine 2% Cloths 1 Application(s) Topical <User Schedule>  dextrose 5%. 1000 milliLiter(s) (100 mL/Hr) IV Continuous <Continuous>  dextrose 5%. 1000 milliLiter(s) (50 mL/Hr) IV Continuous <Continuous>  dextrose 50% Injectable 25 Gram(s) IV Push once  dextrose 50% Injectable 12.5 Gram(s) IV Push once  dextrose 50% Injectable 25 Gram(s) IV Push once  folic acid 1 milliGRAM(s) Oral daily  glucagon  Injectable 1 milliGRAM(s) IntraMuscular once  insulin glargine Injectable (LANTUS) 8 Unit(s) SubCutaneous every morning  insulin lispro (ADMELOG) corrective regimen sliding scale   SubCutaneous three times a day before meals  insulin lispro (ADMELOG) corrective regimen sliding scale   SubCutaneous <User Schedule>  insulin lispro Injectable (ADMELOG) 4 Unit(s) SubCutaneous before breakfast  insulin lispro Injectable (ADMELOG) 2 Unit(s) SubCutaneous before lunch  metoprolol succinate  milliGRAM(s) Oral daily  nystatin    Suspension 866091 Unit(s) Oral three times a day  predniSONE   Tablet 30 milliGRAM(s) Oral daily  senna 2 Tablet(s) Oral at bedtime  tacrolimus 1 milliGRAM(s) Oral <User Schedule>  valGANciclovir 450 milliGRAM(s) Oral <User Schedule>    MEDICATIONS  (PRN):  acetaminophen     Tablet .. 650 milliGRAM(s) Oral every 6 hours PRN Temp greater or equal to 38C (100.4F), Mild Pain (1 - 3)  aluminum hydroxide/magnesium hydroxide/simethicone Suspension 30 milliLiter(s) Oral every 4 hours PRN Dyspepsia  dextrose Oral Gel 15 Gram(s) Oral once PRN Blood Glucose LESS THAN 70 milliGRAM(s)/deciliter      OBJECTIVE:  Vital Signs Last 24 Hrs  T(C): 36.4 (18 Jun 2023 04:49), Max: 37.2 (17 Jun 2023 21:13)  T(F): 97.5 (18 Jun 2023 04:49), Max: 99 (17 Jun 2023 21:13)  HR: 102 (18 Jun 2023 04:49) (99 - 105)  BP: 107/67 (18 Jun 2023 04:49) (107/67 - 117/79)  BP(mean): --  RR: 18 (18 Jun 2023 04:49) (18 - 18)  SpO2: 96% (18 Jun 2023 04:49) (96% - 98%)    Parameters below as of 18 Jun 2023 04:49  Patient On (Oxygen Delivery Method): room air        I&O's Summary    16 Jun 2023 07:01  -  17 Jun 2023 07:00  --------------------------------------------------------  IN: 1080 mL / OUT: 200 mL / NET: 880 mL    17 Jun 2023 07:01  -  18 Jun 2023 06:28  --------------------------------------------------------  IN: 356 mL / OUT: 775 mL / NET: -419 mL        PHYSICAL EXAM:  GENERAL: Laying comfortably, NAD  HEENT: NCAT, PERRLA, EOMI, no scleral icterus, no LAD  NECK: No JVD, supple  LUNG: CTABL; No wheezes, crackles, or rhonchi  HEART: RRR; normal S1/S2; No murmurs, rubs, or gallops  ABDOMEN: +BS, soft, nontender, nondistended, no HSM; No rebound, guarding, or rigidity  EXTREMITIES:  No LE edema b/l, 2+ Peripheral Pulses, No clubbing or cyanosis  NEUROLOGY: AOx3, non-focal, strength 5/5 in all extremities, sensation intact  PSYCH: calm and cooperative  SKIN: No rashes or lesions    LABS:                        9.2    6.80  )-----------( 437      ( 17 Jun 2023 10:10 )             31.6     Auto Eosinophil # x     / Auto Eosinophil % x     / Auto Neutrophil # x     / Auto Neutrophil % x     / BANDS % x        06-17    134<L>  |  100  |  52<H>  ----------------------------<  211<H>  5.1   |  20<L>  |  3.69<H>  06-16    134<L>  |  100  |  49<H>  ----------------------------<  138<H>  4.5   |  20<L>  |  3.71<H>    Ca    8.2<L>      17 Jun 2023 10:10  Ca    8.1<L>      16 Jun 2023 10:36  Phos  3.6     06-17  Mg     2.0     06-17                  CAPILLARY BLOOD GLUCOSE      POCT Blood Glucose.: 127 mg/dL (18 Jun 2023 02:24)  POCT Blood Glucose.: 126 mg/dL (17 Jun 2023 21:54)  POCT Blood Glucose.: 116 mg/dL (17 Jun 2023 16:40)  POCT Blood Glucose.: 248 mg/dL (17 Jun 2023 11:52)  POCT Blood Glucose.: 121 mg/dL (17 Jun 2023 08:33)        RADIOLOGY & ADDITIONAL TESTS:     ***************************************************************  Lio Talley, PGY1  Internal Medicine   TEAMS Preferred  ***************************************************************    BILLY RUSSELL  73y  MRN: [id number]  06-14-23 (4d)    Patient is a 73y old  Male who presents with a chief complaint of Hypoglycemia (17 Jun 2023 14:17)      SUBJECTIVE / OVERNIGHT EVENTS:   No acute overnight events. Pt seen and examined at bedside. Denies fevers, chills, CP, SOB, Abdominal pain, N/V, Constipation, Diarrhea. Last BM yesterday. One episode of abdominal cramping yesterday, resolved with maalox.    12 Point ROS negative with the exception of the above    MEDICATIONS  (STANDING):  apixaban 5 milliGRAM(s) Oral every 12 hours  aspirin  chewable 81 milliGRAM(s) Oral daily  chlorhexidine 2% Cloths 1 Application(s) Topical <User Schedule>  dextrose 5%. 1000 milliLiter(s) (100 mL/Hr) IV Continuous <Continuous>  dextrose 5%. 1000 milliLiter(s) (50 mL/Hr) IV Continuous <Continuous>  dextrose 50% Injectable 25 Gram(s) IV Push once  dextrose 50% Injectable 12.5 Gram(s) IV Push once  dextrose 50% Injectable 25 Gram(s) IV Push once  folic acid 1 milliGRAM(s) Oral daily  glucagon  Injectable 1 milliGRAM(s) IntraMuscular once  insulin glargine Injectable (LANTUS) 8 Unit(s) SubCutaneous every morning  insulin lispro (ADMELOG) corrective regimen sliding scale   SubCutaneous three times a day before meals  insulin lispro (ADMELOG) corrective regimen sliding scale   SubCutaneous <User Schedule>  insulin lispro Injectable (ADMELOG) 4 Unit(s) SubCutaneous before breakfast  insulin lispro Injectable (ADMELOG) 2 Unit(s) SubCutaneous before lunch  metoprolol succinate  milliGRAM(s) Oral daily  nystatin    Suspension 748859 Unit(s) Oral three times a day  predniSONE   Tablet 30 milliGRAM(s) Oral daily  senna 2 Tablet(s) Oral at bedtime  tacrolimus 1 milliGRAM(s) Oral <User Schedule>  valGANciclovir 450 milliGRAM(s) Oral <User Schedule>    MEDICATIONS  (PRN):  acetaminophen     Tablet .. 650 milliGRAM(s) Oral every 6 hours PRN Temp greater or equal to 38C (100.4F), Mild Pain (1 - 3)  aluminum hydroxide/magnesium hydroxide/simethicone Suspension 30 milliLiter(s) Oral every 4 hours PRN Dyspepsia  dextrose Oral Gel 15 Gram(s) Oral once PRN Blood Glucose LESS THAN 70 milliGRAM(s)/deciliter      OBJECTIVE:  Vital Signs Last 24 Hrs  T(C): 36.4 (18 Jun 2023 04:49), Max: 37.2 (17 Jun 2023 21:13)  T(F): 97.5 (18 Jun 2023 04:49), Max: 99 (17 Jun 2023 21:13)  HR: 102 (18 Jun 2023 04:49) (99 - 105)  BP: 107/67 (18 Jun 2023 04:49) (107/67 - 117/79)  BP(mean): --  RR: 18 (18 Jun 2023 04:49) (18 - 18)  SpO2: 96% (18 Jun 2023 04:49) (96% - 98%)    Parameters below as of 18 Jun 2023 04:49  Patient On (Oxygen Delivery Method): room air        I&O's Summary    16 Jun 2023 07:01  -  17 Jun 2023 07:00  --------------------------------------------------------  IN: 1080 mL / OUT: 200 mL / NET: 880 mL    17 Jun 2023 07:01  -  18 Jun 2023 06:28  --------------------------------------------------------  IN: 356 mL / OUT: 775 mL / NET: -419 mL        PHYSICAL EXAM:  GENERAL: Laying comfortably, NAD  HEENT: NCAT, PERRLA, EOMI, no scleral icterus, no LAD  LUNG: CTABL; No wheezes, crackles, or rhonchi  HEART: RRR; normal S1/S2; No murmurs, rubs, or gallops  ABDOMEN: +BS, soft, nontender, nondistended  EXTREMITIES:  No LE edema b/l, 2+ Peripheral Pulses, No clubbing or cyanosis  NEUROLOGY: AOx3, non-focal, sensation intact  PSYCH: calm and cooperative  SKIN: No rashes or lesions    LABS:                          9.2    6.80  )-----------( 437      ( 17 Jun 2023 10:10 )             31.6     Auto Eosinophil # x     / Auto Eosinophil % x     / Auto Neutrophil # x     / Auto Neutrophil % x     / BANDS % x        06-17    134<L>  |  100  |  52<H>  ----------------------------<  211<H>  5.1   |  20<L>  |  3.69<H>  06-16    134<L>  |  100  |  49<H>  ----------------------------<  138<H>  4.5   |  20<L>  |  3.71<H>    Ca    8.2<L>      17 Jun 2023 10:10  Ca    8.1<L>      16 Jun 2023 10:36  Phos  3.6     06-17  Mg     2.0     06-17                  CAPILLARY BLOOD GLUCOSE      POCT Blood Glucose.: 127 mg/dL (18 Jun 2023 02:24)  POCT Blood Glucose.: 126 mg/dL (17 Jun 2023 21:54)  POCT Blood Glucose.: 116 mg/dL (17 Jun 2023 16:40)  POCT Blood Glucose.: 248 mg/dL (17 Jun 2023 11:52)  POCT Blood Glucose.: 121 mg/dL (17 Jun 2023 08:33)        RADIOLOGY & ADDITIONAL TESTS:

## 2023-06-18 NOTE — PROGRESS NOTE ADULT - PROBLEM SELECTOR PLAN 1
Pt. with hemodynamically mediated NORMAN on CKD in the setting of hypotension and supra therapeutic tacrolimus levels (however most recent tacro level does not seem to be an accurate trough level). On review of Lianet FALK/[facility] pt noted to have a baseline SCr ranging between 1.8 -2 with most recent SCr from his previous hospitalization on 6/2 of 2.19. on admission Scr was elevated to 3.19 and was elevated/stable at 3.69 on 6/17/23 (hopefully plateau phase of ATN); pending AM labs. Pt noted to have SBP in the 90's. UA bland and urine lytes reviewed. Diuretics currently held. Dose adjustment of tacrolimus as per levels as per transplant cardiology. Please monitor for urinary retention with serial bladder scans. Monitor labs and urine output. Avoid nephrotoxins. Dose medications as per eGFR.    If any questions, please feel free to contact me     Yaya Walters  Nephrology Fellow  [facility] Pager: [phone].

## 2023-06-18 NOTE — DISCHARGE NOTE PROVIDER - CARE PROVIDER_API CALL
Marvin Campbell  Cardiovascular Disease  [address] [place] [id number]  Phone: [phone]  Fax: [phone]  Scheduled Appointment: 06/20/2023

## 2023-06-18 NOTE — PROGRESS NOTE ADULT - SUBJECTIVE AND OBJECTIVE BOX
[facility] DIVISION OF KIDNEY DISEASES AND HYPERTENSION -- FOLLOW UP NOTE  --------------------------------------------------------------------------------  Chief Complaint: NORMAN on CKD    24 hour events/subjective: Pt. was seen and examined today, not in distress. Denies fever, chills, SOB, CP, dysuria. Scr was elevated at 3.69 on 6/17/23. No BMP available today to review.   Reports that his appetite has improved.    PAST HISTORY  --------------------------------------------------------------------------------  No significant changes to PMH, PSH, FHx, SHx, unless otherwise noted    ALLERGIES & MEDICATIONS  --------------------------------------------------------------------------------  Allergies    No Known Allergies    Intolerances      Standing Inpatient Medications  apixaban 5 milliGRAM(s) Oral every 12 hours  aspirin  chewable 81 milliGRAM(s) Oral daily  chlorhexidine 2% Cloths 1 Application(s) Topical <User Schedule>  dextrose 5%. 1000 milliLiter(s) IV Continuous <Continuous>  dextrose 5%. 1000 milliLiter(s) IV Continuous <Continuous>  dextrose 50% Injectable 25 Gram(s) IV Push once  dextrose 50% Injectable 12.5 Gram(s) IV Push once  dextrose 50% Injectable 25 Gram(s) IV Push once  folic acid 1 milliGRAM(s) Oral daily  glucagon  Injectable 1 milliGRAM(s) IntraMuscular once  insulin glargine Injectable (LANTUS) 8 Unit(s) SubCutaneous every morning  insulin lispro (ADMELOG) corrective regimen sliding scale   SubCutaneous three times a day before meals  insulin lispro (ADMELOG) corrective regimen sliding scale   SubCutaneous <User Schedule>  insulin lispro Injectable (ADMELOG) 4 Unit(s) SubCutaneous before breakfast  insulin lispro Injectable (ADMELOG) 2 Unit(s) SubCutaneous before lunch  metoprolol succinate  milliGRAM(s) Oral daily  nystatin    Suspension 180199 Unit(s) Oral three times a day  predniSONE   Tablet 30 milliGRAM(s) Oral daily  senna 2 Tablet(s) Oral at bedtime  tacrolimus 1 milliGRAM(s) Oral <User Schedule>  valGANciclovir 450 milliGRAM(s) Oral <User Schedule>    PRN Inpatient Medications  acetaminophen     Tablet .. 650 milliGRAM(s) Oral every 6 hours PRN  aluminum hydroxide/magnesium hydroxide/simethicone Suspension 30 milliLiter(s) Oral every 4 hours PRN  dextrose Oral Gel 15 Gram(s) Oral once PRN      REVIEW OF SYSTEMS      All other systems were reviewed and are negative, except as noted.    VITALS/PHYSICAL EXAM  --------------------------------------------------------------------------------  T(C): 36.4 (06-18-23 @ 04:49), Max: 37.2 (06-17-23 @ 21:13)  HR: 102 (06-18-23 @ 04:49) (99 - 105)  BP: 107/67 (06-18-23 @ 04:49) (107/67 - 117/79)  RR: 18 (06-18-23 @ 04:49) (18 - 18)  SpO2: 96% (06-18-23 @ 04:49) (96% - 98%)  Wt(kg): --        06-17-23 @ 07:01  -  06-18-23 @ 07:00  --------------------------------------------------------  IN: 356 mL / OUT: 775 mL / NET: -419 mL        Physical Exam:  	Gen: NAD  	HEENT: MMM  	Pulm: CTA B/L  	CV: S1S2  	Abd: Soft, +BS   	Ext: trace LE edema B/L  	Neuro: Awake  	Skin: Warm and dry        LABS/STUDIES  --------------------------------------------------------------------------------              9.2    6.80  >-----------<  437      [06-17-23 @ 10:10]              31.6     134  |  100  |  52  ----------------------------<  211      [06-17-23 @ 10:10]  5.1   |  20  |  3.69        Ca     8.2     [06-17-23 @ 10:10]      Mg     2.0     [06-17-23 @ 10:10]      Phos  3.6     [06-17-23 @ 10:10]            Creatinine Trend:  SCr 3.69 [06-17 @ 10:10]  SCr 3.71 [06-16 @ 10:36]  SCr 3.20 [06-15 @ 06:05]  SCr 3.19 [06-14 @ 16:13]  SCr 2.19 [06-02 @ 09:23]    Urinalysis - [06-14-23 @ 20:23]      Color Light Yellow / Appearance Clear / SG 1.013 / pH 5.5      Gluc Trace / Ketone Negative  / Bili Negative / Urobili Negative       Blood Negative / Protein Negative / Leuk Est Negative / Nitrite Negative      RBC  / WBC  / Hyaline  / Gran  / Sq Epi  / Non Sq Epi  / Bacteria     Urine Creatinine 56      [06-15-23 @ 01:34]  Urine Protein <7      [06-15-23 @ 01:34]  Urine Sodium 97      [06-15-23 @ 01:34]  Urine Urea Nitrogen 216      [06-15-23 @ 01:34]  Urine Potassium 14      [06-15-23 @ 01:34]  Urine Osmolality 308      [06-15-23 @ 01:33]    HbA1c 4.7      [11-07-18 @ 23:18]  TSH 1.06      [03-03-23 @ 15:09]  Lipid: chol 112, , HDL 45, LDL --      [06-15-23 @ 06:03]

## 2023-06-18 NOTE — PROGRESS NOTE ADULT - PROBLEM SELECTOR PLAN 7
Hold losartan iso NORMAN. BPs soft on admission, CTM  - f/u BCx per cards recs Hold losartan iso NORMAN. BPs soft on admission, CTM  -BCx NGTD (recommended by cards)

## 2023-06-18 NOTE — PROGRESS NOTE ADULT - PROBLEM SELECTOR PLAN 5
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 Tacrolimus level daily before dose; admission level in specimen received.   - He is off cellcept chronically while on prednisone for hemolytic anemia  -Continue Bactrim, valganciclovir, nystatin  -C/w aspirin  - f/u repeat TTE  -Transplant following OHT 2/2018 (on tacrolimus) with a LAD-RV fistula and h/o graft dysfunction with DSAs treated with PLEX/IVIG/rituximab. 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 Tacrolimus level daily before dose; admission level in specimen received.   - He is off cellcept chronically while on prednisone for hemolytic anemia  - Continue Bactrim, valganciclovir, nystatin  - C/w aspirin  - TTE 6/16 - EF 56%  - Transplant following

## 2023-06-18 NOTE — PROGRESS NOTE ADULT - ATTENDING COMMENTS
Patient seen and examined. Plan as discussed w/ Dr. Talley: monitor FS and serum glucose closely - improved, appreciate endocrinology's recommendations for insulin regimen; NORMAN remains elevated, f/u AM labs, monitor patient's UOP; f/u nephrology's recommendations and discuss them w/ transplant cardiology; closely monitor Tacrolimus level (but patient refusing AM labs, wants to wait for phlebotomy - thus, levels may be hard to interpret -- for now, will dose AM dose of Tacrolimus immediately after blood draw, and per transplant pharmacist will continue with 8pm dose as is for now -- will review w/ transplant cardiology); continue PPX medications, renally dosed.

## 2023-06-18 NOTE — DISCHARGE NOTE PROVIDER - HOSPITAL COURSE
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 Patient states that since he left the hospital, his sugars have not been high but he has had intermittent low readings. Yesterday, his blood sugars were in the 30s-50s before presentation. He denies fevers, chills, chest pain, SOB, nausea, vomiting, dizziness, abdominal pain, dysuria, hematuria. When asked about difficulty breathing, he relays that he has had a cough but otherwise hasn't had shortness of breath. Pt denies any sick contacts or recent travel. George Mccracken called for collateral - states that he checks in on the patient daily and administers his insulin. He was giving him his prescribed admelog and confirmed low blood sugar readings. He stated he would correct this by giving his brother juice. Notes that the patient has been skipping meals or eating only 3 spoonfuls of meals. He has also not been drinking much fluid for fear of "swelling up" especially in the legs.     Of note, last admission earlier this month with acute on chronic systolic heart failure with improvement s/p diuresis; discharged on bumex. Course c/b steroid-induced hyperglycemia 2/2 prednisone taken for Nicole syndrome.     In ED, pt received bumex 2 mg IV 1x. Had hypoglycemia episode with FS 64.  (14 Jun 2023 20:00)    Hospital Course:   Patient presented with hypoglycemia (BMP glucose 36). Initially patient had diaphoresis and tremors which resolved with correction of his blood glutose. Endocrinology was consulted to assist with his diabetes regimen. Patient also presented with a significant NORMAN (Cr 3.19, FeUrea 30.1%) and found to have supra-therapeutic tacro levels. Transplant cardiology and cardiorenal were consulted to assist with his care. Recommended holding diuretics and adjusting the dose of his tacro and prophylactic medications. Losartan was also held. Patient was ultimately discharge on an insulin regimen of ____. Patient will need close nephrology, cardiology, and endocrine follow up.    PT was consulted and recommended JP for the patient.     On day of discharge, patient is clinically stable with no new exam findings or acute symptoms compared to prior. The patient was seen by the attending physician on the date of discharge and deemed stable and acceptable for discharge.     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 Patient states that since he left the hospital, his sugars have not been high but he has had intermittent low readings. Yesterday, his blood sugars were in the 30s-50s before presentation. He denies fevers, chills, chest pain, SOB, nausea, vomiting, dizziness, abdominal pain, dysuria, hematuria. When asked about difficulty breathing, he relays that he has had a cough but otherwise hasn't had shortness of breath. Pt denies any sick contacts or recent travel. George Mccracken called for collateral - states that he checks in on the patient daily and administers his insulin. He was giving him his prescribed admelog and confirmed low blood sugar readings. He stated he would correct this by giving his brother juice. Notes that the patient has been skipping meals or eating only 3 spoonfuls of meals. He has also not been drinking much fluid for fear of "swelling up" especially in the legs. Of note, last admission earlier this month with acute on chronic systolic heart failure with improvement s/p diuresis; discharged on bumex. Course c/b steroid-induced hyperglycemia 2/2 prednisone taken for Nicole syndrome. In ED, pt received bumex 2 mg IV 1x. Had hypoglycemia episode with FS 64.      Hospital Course:   Patient presented with hypoglycemia (BMP glucose 36). Initially patient had diaphoresis and tremors which resolved with correction of his blood glutose. Endocrinology was consulted to assist with his diabetes regimen. Patient also presented with a significant NORMAN (Cr 3.19, FeUrea 30.1%) and found to have supra-therapeutic tacro levels. Transplant cardiology and cardiorenal were consulted to assist with his care. Recommended holding diuretics and adjusting the dose of his tacro and prophylactic medications. Losartan was also held. Patient was ultimately discharge on an insulin regimen of ____. Patient will need close nephrology, cardiology, and endocrine follow up. PT was consulted and recommended JP for the patient. On day of discharge, patient is clinically stable with no new exam findings or acute symptoms compared to prior. The patient was seen by the attending physician on the date of discharge and deemed stable and acceptable for discharge.     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 Patient states that since he left the hospital, his sugars have not been high but he has had intermittent low readings. Yesterday, his blood sugars were in the 30s-50s before presentation. He denies fevers, chills, chest pain, SOB, nausea, vomiting, dizziness, abdominal pain, dysuria, hematuria. When asked about difficulty breathing, he relays that he has had a cough but otherwise hasn't had shortness of breath. Pt denies any sick contacts or recent travel. George Mccracken called for collateral - states that he checks in on the patient daily and administers his insulin. He was giving him his prescribed admelog and confirmed low blood sugar readings. He stated he would correct this by giving his brother juice. Notes that the patient has been skipping meals or eating only 3 spoonfuls of meals. He has also not been drinking much fluid for fear of "swelling up" especially in the legs. Of note, last admission earlier this month with acute on chronic systolic heart failure with improvement s/p diuresis; discharged on bumex. Course c/b steroid-induced hyperglycemia 2/2 prednisone taken for Nicole syndrome. In ED, pt received bumex 2 mg IV 1x. Had hypoglycemia episode with FS 64.      Hospital Course:   Patient presented with hypoglycemia (BMP glucose 36). Initially patient had diaphoresis and tremors which resolved with correction of his blood glutose. Endocrinology was consulted to assist with his diabetes regimen. Patient also presented with a significant NORMAN (Cr 3.19, FeUrea 30.1%) and found to have supra-therapeutic tacro levels. Transplant cardiology and cardiorenal were consulted to assist with his care. Recommended holding diuretics and adjusting the dose of his tacro and prophylactic medications. Losartan was also held. Patient will need close nephrology, cardiology, and endocrine follow up. PT was consulted and recommended JP for the patient. On day of discharge, patient is clinically stable with no new exam findings or acute symptoms compared to prior. The patient was seen by the attending physician on the date of discharge and deemed stable and acceptable for discharge.     Discharge follow up action items:   Cardiology follow up  Endocrinology follow up  Nephrology follow up

## 2023-06-18 NOTE — DISCHARGE NOTE PROVIDER - NSDCCPTREATMENT_GEN_ALL_CORE_FT
PRINCIPAL PROCEDURE  Procedure: Ultrasound of kidney and bladder  Findings and Treatment: TECHNIQUE: Sonography of the kidneys and bladder.  FINDINGS:  Right kidney: 10.3 cm. No hydronephrosis. Increased echogenicity. 2.2 x   1.7 x 1.7 cm hypoechoic cortical structure suggesting a medullary pyramid.  Left kidney: 13.1 cm. No hydronephrosis. Increased echogenicity.   Redemonstrated 4.4 cm lower pole cyst..  Urinary bladder: Within normal limits.  IMPRESSION:  No hydronephrosis.  Bilateral increased echogenicity compatible with nonspecific medical   renal disease.  2.2 cm hypoechoic structure in the right renal cortex is suggestive of a   medullary pyramid.

## 2023-06-18 NOTE — DISCHARGE NOTE PROVIDER - NSDCCPCAREPLAN_GEN_ALL_CORE_FT
PRINCIPAL DISCHARGE DIAGNOSIS  Diagnosis: Hypoglycemia  Assessment and Plan of Treatment: You presented to the hospital with tremors and sweats. You were found to have a low blood sugar level. These symptoms resolved with correction of your blood sugar. Endocrinology was consulted to help assist with your care. You insulin regimen was adjusted during your stay. You were ultimately discharged on a regimen of ____. Please follow up with your endocrinologist outpatient. If hunter experience sweating, tremors, confusion, lethargy, shakiness, or dizziness, please check your blood sugar level and seek medical attention if it slow.      SECONDARY DISCHARGE DIAGNOSES  Diagnosis: Acute kidney injury superimposed on CKD  Assessment and Plan of Treatment: You were found during your stay to have worsening kidney function. The nephrologyand cardiology teams were consulted to assist with your care. Your diuretic and losartan were held during your stay. These teams ultimately decided that at home you should be on _____. If you experience any difficulty urinating or decreased urination, then please seek medical attention.  Additionally, your tacrolimus dose was adjusted during your stay as well as your prophylactic medications. At home please take ___.     PRINCIPAL DISCHARGE DIAGNOSIS  Diagnosis: Hypoglycemia  Assessment and Plan of Treatment: You presented to the hospital with tremors and sweats. You were found to have a low blood sugar level. These symptoms resolved with correction of your blood sugar. Endocrinology was consulted to help assist with your care. You insulin regimen was adjusted during your stay. Please follow up with your endocrinologist outpatient. If you experience sweating, tremors, confusion, lethargy, shakiness, or dizziness, please check your blood sugar level and seek medical attention if it is low.      SECONDARY DISCHARGE DIAGNOSES  Diagnosis: Acute kidney injury superimposed on CKD  Assessment and Plan of Treatment: You were found during your stay to have worsening kidney function. The nephrologyand cardiology teams were consulted to assist with your care. Your diuretic and losartan were held during your stay. Additionally, your tacrolimus dose was adjusted during your stay as well as your prophylactic medications. If you experience any difficulty urinating or decreased urination, then please seek medical attention.

## 2023-06-18 NOTE — PROGRESS NOTE ADULT - PROBLEM SELECTOR PLAN 1
Pt admitted for tremors and sweats likely 2/2 hypoglycemia iso poor PO intake  -Pt likely needs titration of insulin regimen given decreased diet at home.   - Reportedly on home glargine 20 daily; admelog 40 pre-breakfast/14 pre-lunch/4 pre-dinner --> per pharm team and records, on admelog 3 pre breakfast  - C/w lantus 10 and ISS. Recalculate premeal based on 24 hour ISS   - DASH diet with CC  - endocrine following, will appreciate recs Pt admitted for tremors and sweats likely 2/2 hypoglycemia iso poor PO intake  -Pt likely needs titration of insulin regimen given decreased diet at home.   - Reportedly on home glargine 20 daily; admelog 40 pre-breakfast/14 pre-lunch/4 pre-dinner --> per pharm team and records, on admelog 3 pre breakfast  - C/w lantus 8, ISS, lispro 4 pre-breakfast, 2u pre-lunch  - DASH diet with CC  - endocrine following, will appreciate recs

## 2023-06-18 NOTE — PROGRESS NOTE ADULT - PROBLEM SELECTOR PLAN 9
Diet: DASH with CC   Dispo: Pending clinical course, PT consulted  DVT: On Eliquis  Code: FULL    PPX given immunosuppresion:   CMV: valgancyclovir 450 daily  Toxo/PCP: bactrim 400-80 POD  CAV: pravastatin 20mg daily, ASA 81mg   Fungal: nystatin Diet: DASH with CC   Dispo: Pending clinical course, PT consulted  DVT: On Eliquis  Code: FULL    PPX given immunosuppresion:   CMV: valgancyclovir 450 twice weekly (renal dosing)  Toxo/PCP: bactrim 400-80 PO three times per week  CAV: pravastatin 20mg daily, ASA 81mg   Fungal: nystatin

## 2023-06-18 NOTE — DISCHARGE NOTE PROVIDER - NSDCFUSCHEDAPPT_GEN_ALL_CORE_FT
Marvin Campbell  [facility]  HEARTFAIL [address]  Scheduled Appointment: 06/20/2023    [facility]  ECHOCARD [address]  Scheduled Appointment: 07/11/2023    Marvin Campbell  [facility]  HEARTFAIL [address]  Scheduled Appointment: 07/11/2023    [facility] [address] [place] [address]  Scheduled Appointment: 08/15/2023    Ricardo Clemens  [address]  Scheduled Appointment: 08/15/2023     [facility]  ECHOCARD [address]  Scheduled Appointment: 07/11/2023    Marvin Campbell  [facility]  HEARTFAIL [address]  Scheduled Appointment: 07/11/2023    [address]  Scheduled Appointment: 08/15/2023    Ricardo Clemens  [address]  Scheduled Appointment: 08/15/2023

## 2023-06-19 ENCOUNTER — APPOINTMENT (OUTPATIENT)
Dept: INFUSION THERAPY | Facility: HOSPITAL | Age: 73
End: 2023-06-19

## 2023-06-19 ENCOUNTER — APPOINTMENT (OUTPATIENT)
Dept: HEMATOLOGY ONCOLOGY | Facility: CLINIC | Age: 73
End: 2023-06-19

## 2023-06-19 LAB
ANION GAP SERPL CALC-SCNC: 17 MMOL/L — SIGNIFICANT CHANGE UP (ref 5–17)
BUN SERPL-MCNC: 54 MG/DL — HIGH (ref 7–23)
CALCIUM SERPL-MCNC: 8.5 MG/DL — SIGNIFICANT CHANGE UP (ref 8.4–10.5)
CHLORIDE SERPL-SCNC: 103 MMOL/L — SIGNIFICANT CHANGE UP (ref 96–108)
CO2 SERPL-SCNC: 18 MMOL/L — LOW (ref 22–31)
CREAT SERPL-MCNC: 2.47 MG/DL — HIGH (ref 0.5–1.3)
EGFR: 27 ML/MIN/1.73M2 — LOW
GLUCOSE BLDC GLUCOMTR-MCNC: 108 MG/DL — HIGH (ref 70–99)
GLUCOSE BLDC GLUCOMTR-MCNC: 120 MG/DL — HIGH (ref 70–99)
GLUCOSE BLDC GLUCOMTR-MCNC: 161 MG/DL — HIGH (ref 70–99)
GLUCOSE BLDC GLUCOMTR-MCNC: 189 MG/DL — HIGH (ref 70–99)
GLUCOSE BLDC GLUCOMTR-MCNC: 194 MG/DL — HIGH (ref 70–99)
GLUCOSE SERPL-MCNC: 185 MG/DL — HIGH (ref 70–99)
HCT VFR BLD CALC: 32.2 % — LOW (ref 39–50)
HGB BLD-MCNC: 9.4 G/DL — LOW (ref 13–17)
MAGNESIUM SERPL-MCNC: 2.4 MG/DL — SIGNIFICANT CHANGE UP (ref 1.6–2.6)
MCHC RBC-ENTMCNC: 29.2 GM/DL — LOW (ref 32–36)
MCHC RBC-ENTMCNC: 29.7 PG — SIGNIFICANT CHANGE UP (ref 27–34)
MCV RBC AUTO: 101.9 FL — HIGH (ref 80–100)
NRBC # BLD: 1 /100 WBCS — HIGH (ref 0–0)
PHOSPHATE SERPL-MCNC: 2.2 MG/DL — LOW (ref 2.5–4.5)
PLATELET # BLD AUTO: 457 K/UL — HIGH (ref 150–400)
POTASSIUM SERPL-MCNC: 5.1 MMOL/L — SIGNIFICANT CHANGE UP (ref 3.5–5.3)
POTASSIUM SERPL-SCNC: 5.1 MMOL/L — SIGNIFICANT CHANGE UP (ref 3.5–5.3)
RBC # BLD: 3.16 M/UL — LOW (ref 4.2–5.8)
RBC # FLD: 17.2 % — HIGH (ref 10.3–14.5)
SARS-COV-2 RNA SPEC QL NAA+PROBE: SIGNIFICANT CHANGE UP
SODIUM SERPL-SCNC: 138 MMOL/L — SIGNIFICANT CHANGE UP (ref 135–145)
TACROLIMUS SERPL-MCNC: 3.7 NG/ML — SIGNIFICANT CHANGE UP
WBC # BLD: 7.3 K/UL — SIGNIFICANT CHANGE UP (ref 3.8–10.5)
WBC # FLD AUTO: 7.3 K/UL — SIGNIFICANT CHANGE UP (ref 3.8–10.5)

## 2023-06-19 PROCEDURE — 99239 HOSP IP/OBS DSCHRG MGMT >30: CPT | Mod: GC

## 2023-06-19 PROCEDURE — 99233 SBSQ HOSP IP/OBS HIGH 50: CPT

## 2023-06-19 PROCEDURE — 99232 SBSQ HOSP IP/OBS MODERATE 35: CPT | Mod: GC

## 2023-06-19 PROCEDURE — 99232 SBSQ HOSP IP/OBS MODERATE 35: CPT

## 2023-06-19 RX ADMIN — Medication 500000 UNIT(S): at 20:48

## 2023-06-19 RX ADMIN — Medication 1: at 08:49

## 2023-06-19 RX ADMIN — INSULIN GLARGINE 8 UNIT(S): 100 INJECTION, SOLUTION SUBCUTANEOUS at 08:50

## 2023-06-19 RX ADMIN — Medication 81 MILLIGRAM(S): at 12:07

## 2023-06-19 RX ADMIN — Medication 1 MILLIGRAM(S): at 12:07

## 2023-06-19 RX ADMIN — Medication 1: at 12:07

## 2023-06-19 RX ADMIN — Medication 100 MILLIGRAM(S): at 05:20

## 2023-06-19 RX ADMIN — TACROLIMUS 1.5 MILLIGRAM(S): 5 CAPSULE ORAL at 20:47

## 2023-06-19 RX ADMIN — Medication 2 UNIT(S): at 12:08

## 2023-06-19 RX ADMIN — Medication 1 TABLET(S): at 08:51

## 2023-06-19 RX ADMIN — Medication 4 UNIT(S): at 08:49

## 2023-06-19 RX ADMIN — Medication 500000 UNIT(S): at 05:19

## 2023-06-19 RX ADMIN — Medication 500000 UNIT(S): at 13:17

## 2023-06-19 RX ADMIN — Medication 1: at 16:59

## 2023-06-19 RX ADMIN — SENNA PLUS 2 TABLET(S): 8.6 TABLET ORAL at 20:48

## 2023-06-19 RX ADMIN — VALGANCICLOVIR 450 MILLIGRAM(S): 450 TABLET, FILM COATED ORAL at 08:51

## 2023-06-19 RX ADMIN — APIXABAN 5 MILLIGRAM(S): 2.5 TABLET, FILM COATED ORAL at 17:01

## 2023-06-19 RX ADMIN — TACROLIMUS 1 MILLIGRAM(S): 5 CAPSULE ORAL at 10:21

## 2023-06-19 RX ADMIN — CHLORHEXIDINE GLUCONATE 1 APPLICATION(S): 213 SOLUTION TOPICAL at 08:51

## 2023-06-19 RX ADMIN — Medication 30 MILLIGRAM(S): at 05:20

## 2023-06-19 RX ADMIN — APIXABAN 5 MILLIGRAM(S): 2.5 TABLET, FILM COATED ORAL at 05:20

## 2023-06-19 NOTE — PROGRESS NOTE ADULT - PROBLEM SELECTOR PLAN 1
-test BG AC/HS  -Continue Lantus 8 units QAM  -Continue Admelog to 4 units w/breakfast and continue 2 units w/lunch  -Continue Admelog low correction scale AC and low HS scale  -cons carb diet  -On Prednisone 30mg>notify endocrine if this dose adjusted.  -Keep hypoglycemia protocol in place   Discharge planning :   - Likely discharge on basal  & bolus. Doses will be determined based on BG trend, insulin requirement, and oral intake.   - Suggested to God brother for patient to take oral medications when the home aid is present or when he is present to ensure appropriate administration. Needs assistance at home  - Ophthalmology and podiatry follow up outpatient   - Has follow up outpatient appointment with Endocrine Dr. Clemens Aug 15th [address] [facility] [address] [place].

## 2023-06-19 NOTE — PROGRESS NOTE ADULT - PROBLEM SELECTOR PLAN 3
Was on pravastatin 20mg   Continue statin therapy if no contraindication. Per primary team.        Can be reached via TEAMS  office:  [phone] (M-F 9a-5pm)               [phone] (nights/weekends)   Can access Amion coverage via [facility]/tools

## 2023-06-19 NOTE — PROGRESS NOTE ADULT - PROBLEM SELECTOR PLAN 6
- noted to have worsening renal function, likely related to dehydration  - adjust immunosuppression as stated above  - holding diuretics - since being admitted was noted to have NORMAN with peak creatinine 3.6. Was likely related to dehydration  - now improving   - immunosuppression was adjusted as stated above  - remains off diuretics at this time  - please ace wrap legs b/l

## 2023-06-19 NOTE — PROGRESS NOTE ADULT - SUBJECTIVE AND OBJECTIVE BOX
***************************************************************  Dale Riley MD (PGY-1)  Internal Medicine  Pager: [phone]  ***************************************************************    PROGRESS NOTE:     Patient is a 73y old  Male who presents with a chief complaint of Hypoglycemia (18 Jun 2023 15:55)      SUBJECTIVE / OVERNIGHT EVENTS: Patient seen and examined at bedside. No acute overnight events.     MEDICATIONS  (STANDING):  apixaban 5 milliGRAM(s) Oral every 12 hours  aspirin  chewable 81 milliGRAM(s) Oral daily  chlorhexidine 2% Cloths 1 Application(s) Topical <User Schedule>  dextrose 5%. 1000 milliLiter(s) (100 mL/Hr) IV Continuous <Continuous>  dextrose 5%. 1000 milliLiter(s) (50 mL/Hr) IV Continuous <Continuous>  dextrose 50% Injectable 25 Gram(s) IV Push once  dextrose 50% Injectable 12.5 Gram(s) IV Push once  dextrose 50% Injectable 25 Gram(s) IV Push once  folic acid 1 milliGRAM(s) Oral daily  glucagon  Injectable 1 milliGRAM(s) IntraMuscular once  insulin glargine Injectable (LANTUS) 8 Unit(s) SubCutaneous every morning  insulin lispro (ADMELOG) corrective regimen sliding scale   SubCutaneous three times a day before meals  insulin lispro (ADMELOG) corrective regimen sliding scale   SubCutaneous <User Schedule>  insulin lispro Injectable (ADMELOG) 4 Unit(s) SubCutaneous before breakfast  insulin lispro Injectable (ADMELOG) 2 Unit(s) SubCutaneous before lunch  metoprolol succinate  milliGRAM(s) Oral daily  nystatin    Suspension [id number] Unit(s) Oral three times a day  predniSONE   Tablet 30 milliGRAM(s) Oral daily  senna 2 Tablet(s) Oral at bedtime  tacrolimus 1.5 milliGRAM(s) Oral <User Schedule>  tacrolimus 1 milliGRAM(s) Oral <User Schedule>  trimethoprim   80 mG/sulfamethoxazole 400 mG 1 Tablet(s) Oral <User Schedule>  valGANciclovir 450 milliGRAM(s) Oral <User Schedule>    MEDICATIONS  (PRN):  acetaminophen     Tablet .. 650 milliGRAM(s) Oral every 6 hours PRN Temp greater or equal to 38C (100.4F), Mild Pain (1 - 3)  aluminum hydroxide/magnesium hydroxide/simethicone Suspension 30 milliLiter(s) Oral every 4 hours PRN Dyspepsia  dextrose Oral Gel 15 Gram(s) Oral once PRN Blood Glucose LESS THAN 70 milliGRAM(s)/deciliter      CAPILLARY BLOOD GLUCOSE      POCT Blood Glucose.: 108 mg/dL (19 Jun 2023 05:18)  POCT Blood Glucose.: 159 mg/dL (18 Jun 2023 21:19)  POCT Blood Glucose.: 98 mg/dL (18 Jun 2023 16:55)  POCT Blood Glucose.: 116 mg/dL (18 Jun 2023 11:45)  POCT Blood Glucose.: 140 mg/dL (18 Jun 2023 07:49)    I&O's Summary    18 Jun 2023 07:01  -  19 Jun 2023 07:00  --------------------------------------------------------  IN: 236 mL / OUT: 700 mL / NET: -464 mL        PHYSICAL EXAM:  Vital Signs Last 24 Hrs  T(C): 36.4 (19 Jun 2023 04:45), Max: 36.6 (18 Jun 2023 20:18)  T(F): 97.6 (19 Jun 2023 04:45), Max: 97.8 (18 Jun 2023 20:18)  HR: 108 (19 Jun 2023 04:45) (101 - 117)  BP: 119/82 (19 Jun 2023 04:45) (114/81 - 122/84)  BP(mean): --  RR: 18 (19 Jun 2023 04:45) (18 - 18)  SpO2: 98% (19 Jun 2023 04:45) (98% - 100%)    Parameters below as of 19 Jun 2023 04:45  Patient On (Oxygen Delivery Method): room air        GENERAL: NAD  HEENT: NCAT, PERRLA, EOMI, no scleral icterus, no LAD  LUNG: CTABL; No wheezes, crackles, or rhonchi  HEART: RRR; normal S1/S2; No murmurs, rubs, or gallops  ABDOMEN: +BS, soft, nontender, nondistended  EXTREMITIES:  No LE edema b/l, 2+ Peripheral Pulses, No clubbing or cyanosis  NEUROLOGY: AOx3, non-focal, sensation intact  PSYCH: calm and cooperative  SKIN: No rashes or lesions    LABS:                        9.0    6.78  )-----------( 442      ( 18 Jun 2023 09:44 )             30.3     06-18    136  |  103  |  54<H>  ----------------------------<  201<H>  5.4<H>   |  21<L>  |  3.23<H>    Ca    8.2<L>      18 Jun 2023 09:44  Phos  2.5     06-18  Mg     2.3     06-18                Culture - Blood (collected 16 Jun 2023 09:25)  Source: .Blood Blood-Peripheral  Preliminary Report (17 Jun 2023 18:01):    No growth to date.    Culture - Blood (collected 16 Jun 2023 09:25)  Source: .Blood Blood-Peripheral  Preliminary Report (17 Jun 2023 18:01):    No growth to date.        RADIOLOGY & ADDITIONAL TESTS:  Results Reviewed:   Imaging Personally Reviewed:  Electrocardiogram Personally Reviewed:    COORDINATION OF CARE:  Care Discussed with Consultants/Other Providers [Y/N]:  Prior or Outpatient Records Reviewed [Y/N]:

## 2023-06-19 NOTE — PROGRESS NOTE ADULT - ATTENDING COMMENTS
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     #Hypoglycemia- resolved. Pt on much lower doses of insulin here. c/w lantus 8U, admelog 4u before breakfast and 2 units before lunch and ISS for bedtime. will f/u with endo for further recommendations  for discharge,  #Heart Transplant Recipient - continue to dose tacro for goal 6-8, please obtain tacro level prior to AM dose, currently on 1mg tacro in the AM and 1.5mg in the PM; c/w ppx  bactrim (PCP ppm) and Valganciclovir (CMV ppx)  #NORMAN on CKD stage 4- Cr is improving. Creatinine Trend: 2.47<--, 3.23<--, 3.69<--, 3.71<--, 3.20<--, 3.19<--    d/c planning in progress.  d/c to [facility] when bed is available Yes

## 2023-06-19 NOTE — PROGRESS NOTE ADULT - PROBLEM SELECTOR PLAN 9
Diet: DASH with CC   Dispo: Pending clinical course, PT consulted  DVT: On Eliquis  Code: FULL    PPX given immunosuppresion:   CMV: valgancyclovir 450 twice weekly (renal dosing)  Toxo/PCP: bactrim 400-80 PO three times per week  CAV: pravastatin 20mg daily, ASA 81mg   Fungal: nystatin

## 2023-06-19 NOTE — PROGRESS NOTE ADULT - PROBLEM SELECTOR PLAN 5
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 Tacrolimus level daily before dose; admission level in specimen received.   - He is off cellcept chronically while on prednisone for hemolytic anemia  - Continue Bactrim, valganciclovir, nystatin  - C/w aspirin  - TTE 6/16 - EF 56%  - Transplant following

## 2023-06-19 NOTE — PROGRESS NOTE ADULT - NS ATTEND AMEND GEN_ALL_CORE FT
Overall, doing well. Needs more supervision at home and family is working on that.  JVP is not elevated, but he has BLE edema. Would wrap the LE to help with mobilizing fluid.  Would not resume diuretics today as his SCr is downtrending since stopping. Home dose is bumex 1 mg po daily.  Please check daily standing weights.    Dispo planning once SCr is returned to baseline.

## 2023-06-19 NOTE — PROGRESS NOTE ADULT - PROBLEM SELECTOR PLAN 1
Pt. with hemodynamically mediated NORMAN on CKD in the setting of hypotension and supra therapeutic tacrolimus levels. On review of [facility] pt noted to have a baseline SCr ranging between 1.8 -2 with most recent SCr from his previous hospitalization on 6/2 of 2.19. on admission. Scr was elevated to 3.19 and was elevated/stable at 3.69 on 6/17/23. UA bland and urine lytes reviewed. Diuretics currently held. Scr decreased to 2.47 today. Pt with bilateral LE edema on exam today. Consider resuming diuretics as per HF/transplant cardiology team. Tacro trough levels at goal. Monitor labs and urine output. Avoid nephrotoxins. Dose medications as per eGFR.

## 2023-06-19 NOTE — PROGRESS NOTE ADULT - SUBJECTIVE AND OBJECTIVE BOX
Seen earlier today     Chief Complaint: Diabetes Mellitus follow up    INTERVAL HX: Tolerating POs with good appetite. BGs in 100s with  at 5 am and 189 at 8 am. Requires much less insulin       Review of Systems:  General: As above  GI: No nausea, vomiting  Endocrine: no  S&Sx of hypoglycemia    Allergies    No Known Allergies    Intolerances      MEDICATIONS  (STANDING):  dextrose 5%. 1000 milliLiter(s) (50 mL/Hr) IV Continuous <Continuous>  dextrose 5%. 1000 milliLiter(s) (100 mL/Hr) IV Continuous <Continuous>  dextrose 50% Injectable 25 Gram(s) IV Push once  dextrose 50% Injectable 12.5 Gram(s) IV Push once  dextrose 50% Injectable 25 Gram(s) IV Push once  glucagon  Injectable 1 milliGRAM(s) IntraMuscular once  insulin glargine Injectable (LANTUS) 8 Unit(s) SubCutaneous every morning  insulin lispro (ADMELOG) corrective regimen sliding scale   SubCutaneous <User Schedule>  insulin lispro (ADMELOG) corrective regimen sliding scale   SubCutaneous three times a day before meals  insulin lispro Injectable (ADMELOG) 4 Unit(s) SubCutaneous before breakfast  insulin lispro Injectable (ADMELOG) 2 Unit(s) SubCutaneous before lunch  metoprolol succinate  milliGRAM(s) Oral daily  predniSONE   Tablet 30 milliGRAM(s) Oral daily  trimethoprim   80 mG/sulfamethoxazole 400 mG 1 Tablet(s) Oral <User Schedule>  valGANciclovir 450 milliGRAM(s) Oral <User Schedule>        insulin glargine Injectable (LANTUS)   8 Unit(s) SubCutaneous (06-19-23 @ 08:50)    insulin lispro (ADMELOG) corrective regimen sliding scale   1 Unit(s) SubCutaneous (06-19-23 @ 12:07)   1 Unit(s) SubCutaneous (06-19-23 @ 08:49)    insulin lispro Injectable (ADMELOG)   4 Unit(s) SubCutaneous (06-19-23 @ 08:49)    insulin lispro Injectable (ADMELOG)   2 Unit(s) SubCutaneous (06-19-23 @ 12:08)    predniSONE   Tablet   30 milliGRAM(s) Oral (06-19-23 @ 05:20)        PHYSICAL EXAM:  VITALS: T(C): 36.6 (06-19-23 @ 11:39)  T(F): 97.9 (06-19-23 @ 11:39), Max: 97.9 (06-19-23 @ 11:39)  HR: 103 (06-19-23 @ 11:39) (103 - 117)  BP: 113/76 (06-19-23 @ 11:39) (113/76 - 122/84)  RR:  (18 - 18)  SpO2:  (98% - 100%)  Wt(kg): --  GENERAL: in NAD  Respiratory: Respirations unlabored   Extremities: Warm, no edema  NEURO: Alert , appropriate     LABS:  POCT Blood Glucose.: 194 mg/dL (06-19-23 @ 11:33)  POCT Blood Glucose.: 189 mg/dL (06-19-23 @ 07:57)  POCT Blood Glucose.: 108 mg/dL (06-19-23 @ 05:18)  POCT Blood Glucose.: 159 mg/dL (06-18-23 @ 21:19)  POCT Blood Glucose.: 98 mg/dL (06-18-23 @ 16:55)  POCT Blood Glucose.: 116 mg/dL (06-18-23 @ 11:45)  POCT Blood Glucose.: 140 mg/dL (06-18-23 @ 07:49)  POCT Blood Glucose.: 127 mg/dL (06-18-23 @ 02:24)  POCT Blood Glucose.: 126 mg/dL (06-17-23 @ 21:54)  POCT Blood Glucose.: 116 mg/dL (06-17-23 @ 16:40)  POCT Blood Glucose.: 248 mg/dL (06-17-23 @ 11:52)  POCT Blood Glucose.: 121 mg/dL (06-17-23 @ 08:33)  POCT Blood Glucose.: 100 mg/dL (06-17-23 @ 03:22)  POCT Blood Glucose.: 146 mg/dL (06-16-23 @ 21:37)  POCT Blood Glucose.: 164 mg/dL (06-16-23 @ 18:54)  POCT Blood Glucose.: 187 mg/dL (06-16-23 @ 16:36)                          9.4    7.30  )-----------( 457      ( 19 Jun 2023 10:09 )             32.2     06-19    138  |  103  |  54<H>  ----------------------------<  185<H>  5.1   |  18<L>  |  2.47<H>    Ca    8.5      19 Jun 2023 10:09  Phos  2.2     06-19  Mg     2.4     06-19      eGFR: 27 mL/min/1.73m2 (19 Jun 2023 10:09)    06-15 Chol 112 Direct LDL -- LDL calculated 41 HDL 45 Trig 128  Thyroid Function Tests:      A1C with Estimated Average Glucose Result: 8.6 % (05-24-23 @ 06:00)    Estimated Average Glucose: 200 mg/dL (05-24-23 @ 06:00)        Diet, DASH/TLC:   Sodium & Cholesterol Restricted  Consistent Carbohydrate Evening Snack (CSTCHOSN)  No Concentrated Potassium (06-16-23 @ 14:17) [Active]

## 2023-06-19 NOTE — PROGRESS NOTE ADULT - PROBLEM SELECTOR PLAN 5
-CMV: due to worsening renal function plan to adjust Valcyte 450 PO M/TH daily   -Toxo/PCP: due to worsening renal function reduce bactrim SS M/W/F  -CAV: pravastatin 20mg daily, ASA 81mg   -Fungal: nystatin. -CMV: remains on Valcyte 450 PO M/TH daily due to elevated renal function   -Toxo/PCP: remains on bactrim SS M/W/F due to elevated renal function   -Fungal: nystatin.

## 2023-06-19 NOTE — PROGRESS NOTE ADULT - ASSESSMENT
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 Upon arrival in the ER he was found to have FS in the 30s-50s and labs revealed worsening NORMAN with creatinine up to 3.2. Since being admitted he has been hypotensive with SBP sitting in the 80s and is asymptomatic.  His renal function continues to worsen, likely related to dehydration. Currently holding all diuretics and adjust immunosuppression Will need to determine safe dispo plan.      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 Upon arrival in the ER he was found to have FS in the 30s-50s and labs revealed worsening NORMAN with creatinine up to 3.2. Since being admitted he was noted to have an NORMAN (peak creatinine 3.6), likely related to dehydration that is now improving.  He remains off diuretics at this time and his immunosuppression has been adjusted. Remain hemodynamically stable and awaiting for renal function to return to baseline. Once his renal function is at baseline will plan to discharge to [facility]. 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 Upon arrival in the ER he was found to have FS in the 30s-50s and labs revealed worsening NORMAN with creatinine up to 3.2. Since being admitted he was noted to have an NORMAN (peak creatinine 3.6), likely related to dehydration that is now improving.  He remains off diuretics at this time and his immunosuppression has been adjusted. Remains hemodynamically stable and awaiting for renal function to return to baseline. Once his renal function is at baseline will plan to discharge to [facility].

## 2023-06-19 NOTE — PROGRESS NOTE ADULT - ASSESSMENT
73 year old male, with past history of HTN, HLD and T2DM on insulin, complicated by steroid-induced hyperglycemia, presents with symptomatic hypoglycemia in the setting of reduced oral intake and possibly missing steroid doses.  Endocrine Following for hypoglycemia. BG Goal 100-180mg/dl.   Tolerating POs with good appetite. BGs in 100s with  at 5 am and 189 at 8 am. 8 am BG elevation likely from steroid dose. Fasting BGs at goal    Home regiment: lantus 20 units in the morning 9am, admelog 30/14/4   HgbA1C 8.6 %( 5/24/23)

## 2023-06-19 NOTE — PROGRESS NOTE ADULT - SUBJECTIVE AND OBJECTIVE BOX
[facility] DIVISION OF KIDNEY DISEASES AND HYPERTENSION   FOLLOW UP NOTE    --------------------------------------------------------------------------------  Chief Complaint: NORMAN on CKD    24 hour events/subjective: Pt. was seen and examined today, not in distress. Denies fever, chills, SOB, CP, dysuria. Scr was elevated at 3.7 on 6/17/23. SCr decreased to 2.47 today.    PAST HISTORY  --------------------------------------------------------------------------------  No significant changes to PMH, PSH, FHx, SHx, unless otherwise noted    ALLERGIES & MEDICATIONS  --------------------------------------------------------------------------------  Allergies    No Known Allergies    Intolerances    Standing Inpatient Medications  apixaban 5 milliGRAM(s) Oral every 12 hours  aspirin  chewable 81 milliGRAM(s) Oral daily  chlorhexidine 2% Cloths 1 Application(s) Topical <User Schedule>  dextrose 5%. 1000 milliLiter(s) IV Continuous <Continuous>  dextrose 5%. 1000 milliLiter(s) IV Continuous <Continuous>  dextrose 50% Injectable 25 Gram(s) IV Push once  dextrose 50% Injectable 12.5 Gram(s) IV Push once  dextrose 50% Injectable 25 Gram(s) IV Push once  folic acid 1 milliGRAM(s) Oral daily  glucagon  Injectable 1 milliGRAM(s) IntraMuscular once  insulin glargine Injectable (LANTUS) 8 Unit(s) SubCutaneous every morning  insulin lispro (ADMELOG) corrective regimen sliding scale   SubCutaneous three times a day before meals  insulin lispro (ADMELOG) corrective regimen sliding scale   SubCutaneous <User Schedule>  insulin lispro Injectable (ADMELOG) 4 Unit(s) SubCutaneous before breakfast  insulin lispro Injectable (ADMELOG) 2 Unit(s) SubCutaneous before lunch  metoprolol succinate  milliGRAM(s) Oral daily  nystatin    Suspension 731566 Unit(s) Oral three times a day  predniSONE   Tablet 30 milliGRAM(s) Oral daily  senna 2 Tablet(s) Oral at bedtime  tacrolimus 1.5 milliGRAM(s) Oral <User Schedule>  tacrolimus 1 milliGRAM(s) Oral <User Schedule>  trimethoprim   80 mG/sulfamethoxazole 400 mG 1 Tablet(s) Oral <User Schedule>  valGANciclovir 450 milliGRAM(s) Oral <User Schedule>    PRN Inpatient Medications  acetaminophen     Tablet .. 650 milliGRAM(s) Oral every 6 hours PRN  aluminum hydroxide/magnesium hydroxide/simethicone Suspension 30 milliLiter(s) Oral every 4 hours PRN  dextrose Oral Gel 15 Gram(s) Oral once PRN      REVIEW OF SYSTEMS  --------------------------------------------------------------------------------  Gen: No fevers/chills  Head/Eyes/Ears: No HA   Respiratory: No dyspnea, cough  CV: No chest pain, OHT  GI: No abdominal pain, diarrhea  : No dysuria, hematuria  MSK: LE edema  Skin: No rashes  Heme: No easy bruising or bleeding    VITALS/PHYSICAL EXAM  --------------------------------------------------------------------------------  T(C): 36.6 (06-19-23 @ 11:39), Max: 36.6 (06-18-23 @ 20:18)  HR: 103 (06-19-23 @ 11:39) (103 - 117)  BP: 113/76 (06-19-23 @ 11:39) (113/76 - 122/84)  RR: 18 (06-19-23 @ 11:39) (18 - 18)  SpO2: 100% (06-19-23 @ 11:39) (98% - 100%)  Wt(kg): --      06-18-23 @ 07:01  -  06-19-23 @ 07:00  --------------------------------------------------------  IN: 236 mL / OUT: 700 mL / NET: -464 mL    06-19-23 @ 07:01  -  06-19-23 @ 12:10  --------------------------------------------------------  IN: 120 mL / OUT: 0 mL / NET: 120 mL      Physical Exam:  	Gen: elderly male, sitting comfortably  	HEENT: Anicteric  	Pulm: CTA B/L  	CV: S1S2+  	Abd: Soft, +BS       	Ext: B/L LE edema++  	Neuro: Awake  	Skin: Warm and dry    LABS/STUDIES  --------------------------------------------------------------------------------              9.4    7.30  >-----------<  457      [06-19-23 @ 10:09]              32.2     138  |  103  |  54  ----------------------------<  185      [06-19-23 @ 10:09]  5.1   |  18  |  2.47        Ca     8.5     [06-19-23 @ 10:09]      Mg     2.4     [06-19-23 @ 10:09]      Phos  2.2     [06-19-23 @ 10:09]      Creatinine Trend:  SCr 2.47 [06-19 @ 10:09]  SCr 3.23 [06-18 @ 09:44]  SCr 3.69 [06-17 @ 10:10]  SCr 3.71 [06-16 @ 10:36]  SCr 3.20 [06-15 @ 06:05]    Urinalysis - [06-14-23 @ 20:23]      Color Light Yellow / Appearance Clear / SG 1.013 / pH 5.5      Gluc Trace / Ketone Negative  / Bili Negative / Urobili Negative       Blood Negative / Protein Negative / Leuk Est Negative / Nitrite Negative      RBC  / WBC  / Hyaline  / Gran  / Sq Epi  / Non Sq Epi  / Bacteria     Urine Creatinine 56      [06-15-23 @ 01:34]  Urine Protein <7      [06-15-23 @ 01:34]  Urine Sodium 97      [06-15-23 @ 01:34]  Urine Urea Nitrogen 216      [06-15-23 @ 01:34]  Urine Potassium 14      [06-15-23 @ 01:34]  Urine Osmolality 308      [06-15-23 @ 01:33]    TacrolimusTacrolimus (), Serum: 4.9 ng/mL (06-18 @ 09:44)  Tacrolimus (), Serum: 5.8 ng/mL (06-17 @ 10:10)  Tacrolimus (), Serum: 14.9 ng/mL (06-16 @ 11:15)

## 2023-06-19 NOTE — PROGRESS NOTE ADULT - ATTENDING COMMENTS
CKD III, now with NORMAN due to hemodynamic compromise  Feeling alright today, no complaints  Improvement in creatinine  Cont. current management  Will follow, remainder per fellow

## 2023-06-19 NOTE — PROGRESS NOTE ADULT - SUBJECTIVE AND OBJECTIVE BOX
ADVANCED HEART FAILURE & TRANSPLANT- PROGRESS NOTE  *To reach the NS1 Team from 8am to 5pm, please call [phone].  ___________________________________________________________________________    Subjective:  - feels well, eager to go home    Medications:  acetaminophen     Tablet .. 650 milliGRAM(s) Oral every 6 hours PRN  aluminum hydroxide/magnesium hydroxide/simethicone Suspension 30 milliLiter(s) Oral every 4 hours PRN  apixaban 5 milliGRAM(s) Oral every 12 hours  aspirin  chewable 81 milliGRAM(s) Oral daily  chlorhexidine 2% Cloths 1 Application(s) Topical <User Schedule>  dextrose 5%. 1000 milliLiter(s) IV Continuous <Continuous>  dextrose 5%. 1000 milliLiter(s) IV Continuous <Continuous>  dextrose 50% Injectable 25 Gram(s) IV Push once  dextrose 50% Injectable 12.5 Gram(s) IV Push once  dextrose 50% Injectable 25 Gram(s) IV Push once  dextrose Oral Gel 15 Gram(s) Oral once PRN  folic acid 1 milliGRAM(s) Oral daily  glucagon  Injectable 1 milliGRAM(s) IntraMuscular once  insulin glargine Injectable (LANTUS) 8 Unit(s) SubCutaneous every morning  insulin lispro (ADMELOG) corrective regimen sliding scale   SubCutaneous <User Schedule>  insulin lispro (ADMELOG) corrective regimen sliding scale   SubCutaneous three times a day before meals  insulin lispro Injectable (ADMELOG) 4 Unit(s) SubCutaneous before breakfast  insulin lispro Injectable (ADMELOG) 2 Unit(s) SubCutaneous before lunch  metoprolol succinate  milliGRAM(s) Oral daily  nystatin    Suspension 705101 Unit(s) Oral three times a day  predniSONE   Tablet 30 milliGRAM(s) Oral daily  senna 2 Tablet(s) Oral at bedtime  tacrolimus 1.5 milliGRAM(s) Oral <User Schedule>  tacrolimus 1 milliGRAM(s) Oral <User Schedule>  trimethoprim   80 mG/sulfamethoxazole 400 mG 1 Tablet(s) Oral <User Schedule>  valGANciclovir 450 milliGRAM(s) Oral <User Schedule>      Vitals:  Vital Signs Last 24 Hours  T(C): 36.6 (06-19-23 @ 11:39), Max: 36.6 (06-18-23 @ 20:18)  HR: 103 (06-19-23 @ 11:39) (103 - 117)  BP: 113/76 (06-19-23 @ 11:39) (113/76 - 122/84)  RR: 18 (06-19-23 @ 11:39) (18 - 18)  SpO2: 100% (06-19-23 @ 11:39) (98% - 100%)        I&O's Summary    18 Jun 2023 07:01  -  19 Jun 2023 07:00  --------------------------------------------------------  IN: 236 mL / OUT: 700 mL / NET: -464 mL    19 Jun 2023 07:01  -  19 Jun 2023 12:56  --------------------------------------------------------  IN: 360 mL / OUT: 0 mL / NET: 360 mL      Physical Exam  General: No distress. Comfortable.  Neck: JVP ~ 10-12cm   Chest: Clear to auscultation bilaterally  CV: Normal S1 and S2. No murmurs, rub, or gallops. Radial pulses normal.  Abdomen: Soft, non-distended, non-tender  Extremities: warm and perfused, trace edema b/l  Neurology: Alert and oriented times three.     Labs:                        9.4    7.30  )-----------( 457      ( 19 Jun 2023 10:09 )             32.2     06-19    138  |  103  |  54<H>  ----------------------------<  185<H>  5.1   |  18<L>  |  2.47<H>    Ca    8.5      19 Jun 2023 10:09  Phos  2.2     06-19  Mg     2.4     06-19         ADVANCED HEART FAILURE & TRANSPLANT- PROGRESS NOTE  *To reach the NS1 Team from 8am to 5pm, please call [phone].  ___________________________________________________________________________    Subjective:  - feels well, eager to go home but knows he will be going to rehab     Medications:  acetaminophen     Tablet .. 650 milliGRAM(s) Oral every 6 hours PRN  aluminum hydroxide/magnesium hydroxide/simethicone Suspension 30 milliLiter(s) Oral every 4 hours PRN  apixaban 5 milliGRAM(s) Oral every 12 hours  aspirin  chewable 81 milliGRAM(s) Oral daily  chlorhexidine 2% Cloths 1 Application(s) Topical <User Schedule>  dextrose 5%. 1000 milliLiter(s) IV Continuous <Continuous>  dextrose 5%. 1000 milliLiter(s) IV Continuous <Continuous>  dextrose 50% Injectable 25 Gram(s) IV Push once  dextrose 50% Injectable 12.5 Gram(s) IV Push once  dextrose 50% Injectable 25 Gram(s) IV Push once  dextrose Oral Gel 15 Gram(s) Oral once PRN  folic acid 1 milliGRAM(s) Oral daily  glucagon  Injectable 1 milliGRAM(s) IntraMuscular once  insulin glargine Injectable (LANTUS) 8 Unit(s) SubCutaneous every morning  insulin lispro (ADMELOG) corrective regimen sliding scale   SubCutaneous <User Schedule>  insulin lispro (ADMELOG) corrective regimen sliding scale   SubCutaneous three times a day before meals  insulin lispro Injectable (ADMELOG) 4 Unit(s) SubCutaneous before breakfast  insulin lispro Injectable (ADMELOG) 2 Unit(s) SubCutaneous before lunch  metoprolol succinate  milliGRAM(s) Oral daily  nystatin    Suspension 851241 Unit(s) Oral three times a day  predniSONE   Tablet 30 milliGRAM(s) Oral daily  senna 2 Tablet(s) Oral at bedtime  tacrolimus 1.5 milliGRAM(s) Oral <User Schedule>  tacrolimus 1 milliGRAM(s) Oral <User Schedule>  trimethoprim   80 mG/sulfamethoxazole 400 mG 1 Tablet(s) Oral <User Schedule>  valGANciclovir 450 milliGRAM(s) Oral <User Schedule>      Vitals:  Vital Signs Last 24 Hours  T(C): 36.6 (06-19-23 @ 11:39), Max: 36.6 (06-18-23 @ 20:18)  HR: 103 (06-19-23 @ 11:39) (103 - 117)  BP: 113/76 (06-19-23 @ 11:39) (113/76 - 122/84)  RR: 18 (06-19-23 @ 11:39) (18 - 18)  SpO2: 100% (06-19-23 @ 11:39) (98% - 100%)        I&O's Summary    18 Jun 2023 07:01  -  19 Jun 2023 07:00  --------------------------------------------------------  IN: 236 mL / OUT: 700 mL / NET: -464 mL    19 Jun 2023 07:01  -  19 Jun 2023 12:56  --------------------------------------------------------  IN: 360 mL / OUT: 0 mL / NET: 360 mL      Physical Exam  General: No distress. Comfortable.  Neck: JVP ~ 10-12cm   Chest: Clear to auscultation bilaterally  CV: Normal S1 and S2. No murmurs, rub, or gallops. Radial pulses normal.  Abdomen: Soft, non-distended, non-tender  Extremities: warm and perfused, trace edema b/l  Neurology: Alert and oriented times three.     Labs:                        9.4    7.30  )-----------( 457      ( 19 Jun 2023 10:09 )             32.2     06-19    138  |  103  |  54<H>  ----------------------------<  185<H>  5.1   |  18<L>  |  2.47<H>    Ca    8.5      19 Jun 2023 10:09  Phos  2.2     06-19  Mg     2.4     06-19         ADVANCED HEART FAILURE & TRANSPLANT- PROGRESS NOTE  *To reach the NS1 Team from 8am to 5pm, please call [phone].  ___________________________________________________________________________    Subjective:  - feels well, eager to go home but knows he will be going to rehab     Medications:  acetaminophen     Tablet .. 650 milliGRAM(s) Oral every 6 hours PRN  aluminum hydroxide/magnesium hydroxide/simethicone Suspension 30 milliLiter(s) Oral every 4 hours PRN  apixaban 5 milliGRAM(s) Oral every 12 hours  aspirin  chewable 81 milliGRAM(s) Oral daily  chlorhexidine 2% Cloths 1 Application(s) Topical <User Schedule>  dextrose 5%. 1000 milliLiter(s) IV Continuous <Continuous>  dextrose 5%. 1000 milliLiter(s) IV Continuous <Continuous>  dextrose 50% Injectable 25 Gram(s) IV Push once  dextrose 50% Injectable 12.5 Gram(s) IV Push once  dextrose 50% Injectable 25 Gram(s) IV Push once  dextrose Oral Gel 15 Gram(s) Oral once PRN  folic acid 1 milliGRAM(s) Oral daily  glucagon  Injectable 1 milliGRAM(s) IntraMuscular once  insulin glargine Injectable (LANTUS) 8 Unit(s) SubCutaneous every morning  insulin lispro (ADMELOG) corrective regimen sliding scale   SubCutaneous <User Schedule>  insulin lispro (ADMELOG) corrective regimen sliding scale   SubCutaneous three times a day before meals  insulin lispro Injectable (ADMELOG) 4 Unit(s) SubCutaneous before breakfast  insulin lispro Injectable (ADMELOG) 2 Unit(s) SubCutaneous before lunch  metoprolol succinate  milliGRAM(s) Oral daily  nystatin    Suspension [id number] Unit(s) Oral three times a day  predniSONE   Tablet 30 milliGRAM(s) Oral daily  senna 2 Tablet(s) Oral at bedtime  tacrolimus 1.5 milliGRAM(s) Oral <User Schedule>  tacrolimus 1 milliGRAM(s) Oral <User Schedule>  trimethoprim   80 mG/sulfamethoxazole 400 mG 1 Tablet(s) Oral <User Schedule>  valGANciclovir 450 milliGRAM(s) Oral <User Schedule>      Vitals:  Vital Signs Last 24 Hours  T(C): 36.6 (06-19-23 @ 11:39), Max: 36.6 (06-18-23 @ 20:18)  HR: 103 (06-19-23 @ 11:39) (103 - 117)  BP: 113/76 (06-19-23 @ 11:39) (113/76 - 122/84)  RR: 18 (06-19-23 @ 11:39) (18 - 18)  SpO2: 100% (06-19-23 @ 11:39) (98% - 100%)        I&O's Summary    18 Jun 2023 07:01  -  19 Jun 2023 07:00  --------------------------------------------------------  IN: 236 mL / OUT: 700 mL / NET: -464 mL    19 Jun 2023 07:01  -  19 Jun 2023 12:56  --------------------------------------------------------  IN: 360 mL / OUT: 0 mL / NET: 360 mL      Physical Exam  General: No distress. Comfortable.  Neck: JVP 8 cm H2O  Chest: Clear to auscultation bilaterally  CV: Normal S1 and S2. No murmurs, rub, or gallops. Radial pulses normal.  Abdomen: Soft, non-distended, non-tender  Extremities: warm and perfused, 1+ BLE edema to knee  Neurology: Alert and oriented times three.     Labs:                        9.4    7.30  )-----------( 457      ( 19 Jun 2023 10:09 )             32.2     06-19    138  |  103  |  54<H>  ----------------------------<  185<H>  5.1   |  18<L>  |  2.47<H>    Ca    8.5      19 Jun 2023 10:09  Phos  2.2     06-19  Mg     2.4     06-19

## 2023-06-19 NOTE — PROGRESS NOTE ADULT - PROBLEM SELECTOR PLAN 2
- BP continue to remain liable   - continue Toprol  mg PO QD   - remains off losartan since last admission  - encourage to increase PO intake  - please avoid giving IV fluids  - due to his history of infections blood cx were sent on 6/15  - ECHO completed and awaiting read - now improved  - continue Toprol  mg PO QD (Of note home dose was Toprol  mg PO QD, reduced during this admission for hypotension) - now improved  - continue reduced dose Toprol  mg PO QD (home dose was Toprol  mg PO QD)

## 2023-06-19 NOTE — PROGRESS NOTE ADULT - PROBLEM SELECTOR PLAN 3
-5 years out, transplant on 2/23/2018, from HM2 VAD due to pump thrombosis  -stable coronary cameral fistula  -see immunosuppression below -5 years out, transplant on 2/23/2018, from HM2 VAD due to pump thrombosis  -stable coronary cameral fistula  -see immunosuppression below  -CAV ppx, continue pravastatin 20mg daily, ASA 81mg

## 2023-06-19 NOTE — PROGRESS NOTE ADULT - PROBLEM SELECTOR PLAN 1
Pt admitted for tremors and sweats likely 2/2 hypoglycemia iso poor PO intake  -Pt likely needs titration of insulin regimen given decreased diet at home.   - Reportedly on home glargine 20 daily; admelog 40 pre-breakfast/14 pre-lunch/4 pre-dinner --> per pharm team and records, on admelog 3 pre breakfast  - C/w lantus 8, ISS, lispro 4 pre-breakfast, 2u pre-lunch  - DASH diet with CC  - endocrine following, will appreciate recs

## 2023-06-19 NOTE — PROGRESS NOTE ADULT - ASSESSMENT
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

## 2023-06-19 NOTE — PROGRESS NOTE ADULT - PROBLEM SELECTOR PLAN 4
- will continue to dose tacro for goal 6-8, please obtain tacro level prior to AM dose  - continue pred 30mg for hemolytic anemia as stated above   - off cellcept. - will continue to dose tacro for goal 6-8, please obtain tacro level prior to AM dose  - continue pred 30mg for hemolytic anemia as stated above   - remains off cellcept.

## 2023-06-20 ENCOUNTER — APPOINTMENT (OUTPATIENT)
Dept: HEART FAILURE | Facility: CLINIC | Age: 73
End: 2023-06-20

## 2023-06-20 LAB
ALBUMIN SERPL ELPH-MCNC: 3.8 G/DL — SIGNIFICANT CHANGE UP (ref 3.3–5)
ALP SERPL-CCNC: 47 U/L — SIGNIFICANT CHANGE UP (ref 40–120)
ALT FLD-CCNC: 12 U/L — SIGNIFICANT CHANGE UP (ref 10–45)
ANION GAP SERPL CALC-SCNC: 12 MMOL/L — SIGNIFICANT CHANGE UP (ref 5–17)
APTT BLD: 33.6 SEC — SIGNIFICANT CHANGE UP (ref 27.5–35.5)
AST SERPL-CCNC: 12 U/L — SIGNIFICANT CHANGE UP (ref 10–40)
BILIRUB SERPL-MCNC: 0.3 MG/DL — SIGNIFICANT CHANGE UP (ref 0.2–1.2)
BUN SERPL-MCNC: 44 MG/DL — HIGH (ref 7–23)
CALCIUM SERPL-MCNC: 8.7 MG/DL — SIGNIFICANT CHANGE UP (ref 8.4–10.5)
CHLORIDE SERPL-SCNC: 105 MMOL/L — SIGNIFICANT CHANGE UP (ref 96–108)
CO2 SERPL-SCNC: 21 MMOL/L — LOW (ref 22–31)
CREAT SERPL-MCNC: 1.94 MG/DL — HIGH (ref 0.5–1.3)
EGFR: 36 ML/MIN/1.73M2 — LOW
GLUCOSE BLDC GLUCOMTR-MCNC: 101 MG/DL — HIGH (ref 70–99)
GLUCOSE BLDC GLUCOMTR-MCNC: 103 MG/DL — HIGH (ref 70–99)
GLUCOSE BLDC GLUCOMTR-MCNC: 215 MG/DL — HIGH (ref 70–99)
GLUCOSE BLDC GLUCOMTR-MCNC: 275 MG/DL — HIGH (ref 70–99)
GLUCOSE BLDC GLUCOMTR-MCNC: 95 MG/DL — SIGNIFICANT CHANGE UP (ref 70–99)
GLUCOSE SERPL-MCNC: 140 MG/DL — HIGH (ref 70–99)
HCT VFR BLD CALC: 30.4 % — LOW (ref 39–50)
HGB BLD-MCNC: 9 G/DL — LOW (ref 13–17)
INR BLD: 1.55 RATIO — HIGH (ref 0.88–1.16)
MAGNESIUM SERPL-MCNC: 2.4 MG/DL — SIGNIFICANT CHANGE UP (ref 1.6–2.6)
MCHC RBC-ENTMCNC: 29.6 GM/DL — LOW (ref 32–36)
MCHC RBC-ENTMCNC: 29.8 PG — SIGNIFICANT CHANGE UP (ref 27–34)
MCV RBC AUTO: 100.7 FL — HIGH (ref 80–100)
NRBC # BLD: 1 /100 WBCS — HIGH (ref 0–0)
PHOSPHATE SERPL-MCNC: 2.4 MG/DL — LOW (ref 2.5–4.5)
PLATELET # BLD AUTO: 508 K/UL — HIGH (ref 150–400)
POTASSIUM SERPL-MCNC: 5 MMOL/L — SIGNIFICANT CHANGE UP (ref 3.5–5.3)
POTASSIUM SERPL-SCNC: 5 MMOL/L — SIGNIFICANT CHANGE UP (ref 3.5–5.3)
PROT SERPL-MCNC: 6.4 G/DL — SIGNIFICANT CHANGE UP (ref 6–8.3)
PROTHROM AB SERPL-ACNC: 17.9 SEC — HIGH (ref 10.5–13.4)
RBC # BLD: 3.02 M/UL — LOW (ref 4.2–5.8)
RBC # FLD: 17.2 % — HIGH (ref 10.3–14.5)
SODIUM SERPL-SCNC: 138 MMOL/L — SIGNIFICANT CHANGE UP (ref 135–145)
TACROLIMUS SERPL-MCNC: 2.8 NG/ML — SIGNIFICANT CHANGE UP
WBC # BLD: 8.61 K/UL — SIGNIFICANT CHANGE UP (ref 3.8–10.5)
WBC # FLD AUTO: 8.61 K/UL — SIGNIFICANT CHANGE UP (ref 3.8–10.5)

## 2023-06-20 PROCEDURE — 99232 SBSQ HOSP IP/OBS MODERATE 35: CPT | Mod: GC

## 2023-06-20 PROCEDURE — 99232 SBSQ HOSP IP/OBS MODERATE 35: CPT

## 2023-06-20 RX ORDER — INSULIN LISPRO 100/ML
6 VIAL (ML) SUBCUTANEOUS
Refills: 0 | Status: DISCONTINUED | OUTPATIENT
Start: 2023-06-21 | End: 2023-06-21

## 2023-06-20 RX ORDER — ASPIRIN/CALCIUM CARB/MAGNESIUM 324 MG
1 TABLET ORAL
Qty: 0 | Refills: 0 | DISCHARGE

## 2023-06-20 RX ORDER — NYSTATIN 500MM UNIT
5 POWDER (EA) MISCELLANEOUS
Refills: 0 | DISCHARGE
Start: 2023-06-20

## 2023-06-20 RX ORDER — APIXABAN 2.5 MG/1
1 TABLET, FILM COATED ORAL
Qty: 0 | Refills: 0 | DISCHARGE
Start: 2023-06-20

## 2023-06-20 RX ORDER — SODIUM,POTASSIUM PHOSPHATES 278-250MG
1 POWDER IN PACKET (EA) ORAL ONCE
Refills: 0 | Status: COMPLETED | OUTPATIENT
Start: 2023-06-20 | End: 2023-06-20

## 2023-06-20 RX ORDER — NYSTATIN 500MM UNIT
5 POWDER (EA) MISCELLANEOUS
Refills: 0 | DISCHARGE

## 2023-06-20 RX ORDER — METOPROLOL TARTRATE 50 MG
2 TABLET ORAL
Refills: 0 | DISCHARGE
Start: 2023-06-20

## 2023-06-20 RX ORDER — TACROLIMUS 5 MG/1
2 CAPSULE ORAL
Qty: 0 | Refills: 0 | DISCHARGE
Start: 2023-06-20

## 2023-06-20 RX ORDER — TACROLIMUS 5 MG/1
2 CAPSULE ORAL
Refills: 0 | Status: ACTIVE | OUTPATIENT
Start: 2023-06-20 | End: 2024-05-18

## 2023-06-20 RX ORDER — ASPIRIN/CALCIUM CARB/MAGNESIUM 324 MG
1 TABLET ORAL
Qty: 0 | Refills: 0 | DISCHARGE
Start: 2023-06-20

## 2023-06-20 RX ORDER — INSULIN LISPRO 100/ML
2 VIAL (ML) SUBCUTANEOUS
Refills: 0 | Status: DISCONTINUED | OUTPATIENT
Start: 2023-06-21 | End: 2023-06-21

## 2023-06-20 RX ORDER — VALGANCICLOVIR 450 MG/1
1 TABLET, FILM COATED ORAL
Qty: 0 | Refills: 0 | DISCHARGE
Start: 2023-06-20

## 2023-06-20 RX ADMIN — APIXABAN 5 MILLIGRAM(S): 2.5 TABLET, FILM COATED ORAL at 05:13

## 2023-06-20 RX ADMIN — Medication 2 UNIT(S): at 11:55

## 2023-06-20 RX ADMIN — TACROLIMUS 2 MILLIGRAM(S): 5 CAPSULE ORAL at 20:15

## 2023-06-20 RX ADMIN — APIXABAN 5 MILLIGRAM(S): 2.5 TABLET, FILM COATED ORAL at 17:32

## 2023-06-20 RX ADMIN — Medication 1 PACKET(S): at 11:56

## 2023-06-20 RX ADMIN — Medication 2: at 08:32

## 2023-06-20 RX ADMIN — Medication 500000 UNIT(S): at 05:15

## 2023-06-20 RX ADMIN — Medication 4 UNIT(S): at 08:33

## 2023-06-20 RX ADMIN — CHLORHEXIDINE GLUCONATE 1 APPLICATION(S): 213 SOLUTION TOPICAL at 08:56

## 2023-06-20 RX ADMIN — TACROLIMUS 1 MILLIGRAM(S): 5 CAPSULE ORAL at 09:32

## 2023-06-20 RX ADMIN — Medication 3: at 11:55

## 2023-06-20 RX ADMIN — Medication 500000 UNIT(S): at 13:27

## 2023-06-20 RX ADMIN — INSULIN GLARGINE 8 UNIT(S): 100 INJECTION, SOLUTION SUBCUTANEOUS at 08:54

## 2023-06-20 RX ADMIN — Medication 1 MILLIGRAM(S): at 11:55

## 2023-06-20 RX ADMIN — Medication 100 MILLIGRAM(S): at 05:13

## 2023-06-20 RX ADMIN — Medication 30 MILLIGRAM(S): at 05:13

## 2023-06-20 RX ADMIN — Medication 81 MILLIGRAM(S): at 11:56

## 2023-06-20 RX ADMIN — Medication 500000 UNIT(S): at 21:37

## 2023-06-20 NOTE — PROGRESS NOTE ADULT - PROBLEM SELECTOR PLAN 1
Pt. with hemodynamically mediated NORMAN on CKD in the setting of hypotension and supra therapeutic tacrolimus levels. On review of [facility] pt noted to have a baseline SCr ranging between 1.8 -2 with most recent SCr from his previous hospitalization on 6/2 of 2.19. on admission. Scr was elevated to 3.19 and was elevated/stable at 3.69 on 6/17/23. UA bland and urine lytes reviewed. Diuretics currently held. Scr decreased to 1.94 today. Tacro trough levels at goal. Monitor labs and urine output. Avoid nephrotoxins. Dose medications as per eGFR.

## 2023-06-20 NOTE — PROGRESS NOTE ADULT - ASSESSMENT
73 year old male, with past history of HTN, HLD and T2DM on insulin, complicated by steroid-induced hyperglycemia, presents with symptomatic hypoglycemia in the setting of reduced oral intake and possibly missing steroid doses.  Endocrine Following for hypoglycemia. BG Goal 100-180mg/dl.   Tolerating POs. BGs in 100s - 200s.  BGs elevated to 200s in am with  2 am . Will be cautious changing Lantus dose to avoid night time hypoglycemia, will adjust premeal insulin dose.     Home regiment: lantus 20 units in the morning 9am, admelog 30/14/4   HgbA1C 8.6 %( 5/24/23)

## 2023-06-20 NOTE — PROGRESS NOTE ADULT - ATTENDING COMMENTS
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     #Hypoglycemia- resolved. Pt on much lower doses of insulin here. c/w lantus 8U, admelog 6u before breakfast and 3 units before lunch and ISS for bedtime. Endo f/u appreciated.  #Heart Transplant Recipient - continue to dose tacro for goal 6-8, tacro level is below goal (2.8) so tacro was increased from 1mg in the AM and 1.5mg in the PM --> to tacro 2mg in the am and 2 mg in the pm; c/w ppx  bactrim (PCP ppm) and Valganciclovir (CMV ppx)  #NORMAN on CKD stage 4- Cr is improving. Creatinine Trend: 1.94<--, 2.47<--, 3.23<--, 3.69<--, 3.71<--, 3.20<--    d/c planning in progress.  d/c to [facility] when bed is available

## 2023-06-20 NOTE — PROGRESS NOTE ADULT - ATTENDING COMMENTS
CKD III, now with NORMAN due to hemodynamic compromise  Creatinine continues to improved  Cont. current management  Will follow, remainder per fellow

## 2023-06-20 NOTE — PROGRESS NOTE ADULT - SUBJECTIVE AND OBJECTIVE BOX
Seen earlier today     Chief Complaint: Diabetes Mellitus follow up    INTERVAL HX: Tolerating POs as per RN. BGs in 100s- 200s. BGs elevated in am with  2 am . Noted Cr improving.       Review of Systems:  General: As above  GI: No nausea, vomiting  Endocrine: no  S&Sx of hypoglycemia    Allergies    No Known Allergies    Intolerances      MEDICATIONS  (STANDING):  apixaban 5 milliGRAM(s) Oral every 12 hours  aspirin  chewable 81 milliGRAM(s) Oral daily  chlorhexidine 2% Cloths 1 Application(s) Topical <User Schedule>  dextrose 5%. 1000 milliLiter(s) (50 mL/Hr) IV Continuous <Continuous>  dextrose 5%. 1000 milliLiter(s) (100 mL/Hr) IV Continuous <Continuous>  dextrose 50% Injectable 25 Gram(s) IV Push once  dextrose 50% Injectable 25 Gram(s) IV Push once  dextrose 50% Injectable 12.5 Gram(s) IV Push once  folic acid 1 milliGRAM(s) Oral daily  glucagon  Injectable 1 milliGRAM(s) IntraMuscular once  insulin glargine Injectable (LANTUS) 8 Unit(s) SubCutaneous every morning  insulin lispro (ADMELOG) corrective regimen sliding scale   SubCutaneous three times a day before meals  insulin lispro (ADMELOG) corrective regimen sliding scale   SubCutaneous <User Schedule>  metoprolol succinate  milliGRAM(s) Oral daily  nystatin    Suspension 166110 Unit(s) Oral three times a day  predniSONE   Tablet 30 milliGRAM(s) Oral daily  senna 2 Tablet(s) Oral at bedtime  tacrolimus 2 milliGRAM(s) Oral <User Schedule>  trimethoprim   80 mG/sulfamethoxazole 400 mG 1 Tablet(s) Oral <User Schedule>  valGANciclovir 450 milliGRAM(s) Oral <User Schedule>        insulin glargine Injectable (LANTUS)   8 Unit(s) SubCutaneous (06-20-23 @ 08:54)    insulin lispro (ADMELOG) corrective regimen sliding scale   3 Unit(s) SubCutaneous (06-20-23 @ 11:55)   2 Unit(s) SubCutaneous (06-20-23 @ 08:32)   1 Unit(s) SubCutaneous (06-19-23 @ 16:59)    insulin lispro Injectable (ADMELOG)   4 Unit(s) SubCutaneous (06-20-23 @ 08:33)    insulin lispro Injectable (ADMELOG)   2 Unit(s) SubCutaneous (06-20-23 @ 11:55)    predniSONE   Tablet   30 milliGRAM(s) Oral (06-20-23 @ 05:13)        PHYSICAL EXAM:  VITALS: T(C): 36.5 (06-20-23 @ 11:39)  T(F): 97.7 (06-20-23 @ 11:39), Max: 97.7 (06-20-23 @ 11:39)  HR: 107 (06-20-23 @ 11:39) (96 - 107)  BP: 108/76 (06-20-23 @ 11:39) (108/76 - 130/90)  RR:  (18 - 18)  SpO2:  (97% - 99%)  Wt(kg): --  GENERAL: in NAD  Respiratory: Respirations unlabored   Extremities: Warm, no edema  NEURO: Alert , appropriate     LABS:  POCT Blood Glucose.: 275 mg/dL (06-20-23 @ 11:52)  POCT Blood Glucose.: 215 mg/dL (06-20-23 @ 08:20)  POCT Blood Glucose.: 101 mg/dL (06-20-23 @ 02:14)  POCT Blood Glucose.: 120 mg/dL (06-19-23 @ 21:42)  POCT Blood Glucose.: 161 mg/dL (06-19-23 @ 16:54)  POCT Blood Glucose.: 194 mg/dL (06-19-23 @ 11:33)  POCT Blood Glucose.: 189 mg/dL (06-19-23 @ 07:57)  POCT Blood Glucose.: 108 mg/dL (06-19-23 @ 05:18)  POCT Blood Glucose.: 159 mg/dL (06-18-23 @ 21:19)  POCT Blood Glucose.: 98 mg/dL (06-18-23 @ 16:55)  POCT Blood Glucose.: 116 mg/dL (06-18-23 @ 11:45)  POCT Blood Glucose.: 140 mg/dL (06-18-23 @ 07:49)  POCT Blood Glucose.: 127 mg/dL (06-18-23 @ 02:24)  POCT Blood Glucose.: 126 mg/dL (06-17-23 @ 21:54)  POCT Blood Glucose.: 116 mg/dL (06-17-23 @ 16:40)                          9.0    8.61  )-----------( 508      ( 20 Jun 2023 10:06 )             30.4     06-20    138  |  105  |  44<H>  ----------------------------<  140<H>  5.0   |  21<L>  |  1.94<H>    Ca    8.7      20 Jun 2023 10:06  Phos  2.4     06-20  Mg     2.4     06-20    TPro  6.4  /  Alb  3.8  /  TBili  0.3  /  DBili  x   /  AST  12  /  ALT  12  /  AlkPhos  47  06-20    eGFR: 36 mL/min/1.73m2 (20 Jun 2023 10:06)    06-15 Chol 112 Direct LDL -- LDL calculated 41 HDL 45 Trig 128  Thyroid Function Tests:      A1C with Estimated Average Glucose Result: 8.6 % (05-24-23 @ 06:00)    Estimated Average Glucose: 200 mg/dL (05-24-23 @ 06:00)        Diet, DASH/TLC:   Sodium & Cholesterol Restricted  Consistent Carbohydrate Evening Snack (CSTCHOSN)  No Concentrated Potassium (06-16-23 @ 14:17) [Active]

## 2023-06-20 NOTE — PROGRESS NOTE ADULT - PROBLEM SELECTOR PLAN 1
-test BG AC/HS  -Continue Lantus 8 units QAM  -Continue Admelog to 6 units w/breakfast and continue 2 units w/lunch  -Continue Admelog low correction scale AC and low HS scale  -cons carb diet  -On Prednisone 30mg>notify endocrine if this dose adjusted.  -Keep hypoglycemia protocol in place   Discharge planning :   - Likely discharge on basal  & bolus. Doses will be determined based on BG trend, insulin requirement, and oral intake.   - Suggested to God brother for patient to take oral medications when the home aid is present or when he is present to ensure appropriate administration. Needs assistance at home  - Ophthalmology and podiatry follow up outpatient   - Has follow up outpatient appointment with Endocrine Dr. Clemens Aug 15th [address] [place].

## 2023-06-20 NOTE — PROGRESS NOTE ADULT - PROBLEM SELECTOR PLAN 3
Was on pravastatin 20mg   Continue statin therapy if no contraindication. Per primary team.        Can be reached via TEAMS  office:  [phone] (M-F 9a-5pm)               [phone] (nights/weekends)   Can access Amion coverage via sunrise/tools

## 2023-06-20 NOTE — PROGRESS NOTE ADULT - PROBLEM SELECTOR PLAN 9
Diet: DASH with CC   Dispo: Pending clinical course, PT consulted  DVT: On Eliquis  Code: FULL    PPX given immunosuppresion:   CMV: valgancyclovir 450 twice weekly (renal dosing)  Toxo/PCP: bactrim 400-80 PO three times per week  CAV: pravastatin 20mg daily, ASA 81mg   Fungal: nystatin Diet: DASH with CC   Dispo: JP  DVT: On Eliquis  Code: FULL    PPX given immunosuppresion:   CMV: valgancyclovir 450 twice weekly (renal dosing)  Toxo/PCP: bactrim 400-80 PO three times per week  CAV: pravastatin 20mg daily, ASA 81mg   Fungal: nystatin 36.4

## 2023-06-20 NOTE — PROVIDER CONTACT NOTE (OTHER) - ACTION/TREATMENT ORDERED:
If no IV medications ordered for tonight okay to wait for IV team.
MD Starr made aware. As per MD, okay for pt to have no IV access at this time.

## 2023-06-20 NOTE — PROGRESS NOTE ADULT - PROBLEM SELECTOR PLAN 1
Pt admitted for tremors and sweats likely 2/2 hypoglycemia iso poor PO intake  -Pt likely needs titration of insulin regimen given decreased diet at home.   - Reportedly on home glargine 20 daily; admelog 40 pre-breakfast/14 pre-lunch/4 pre-dinner --> per pharm team and records, on admelog 3 pre breakfast  - C/w lantus 8, ISS, lispro 4 pre-breakfast, 2u pre-lunch  - DASH diet with CC  - endocrine recs

## 2023-06-20 NOTE — PROGRESS NOTE ADULT - SUBJECTIVE AND OBJECTIVE BOX
***************************************************************  Dale Riley MD (PGY-1)  Internal Medicine  Pager: [phone]  ***************************************************************    PROGRESS NOTE:     Patient is a 73y old  Male who presents with a chief complaint of Hypoglycemia (19 Jun 2023 15:04)      SUBJECTIVE / OVERNIGHT EVENTS: Patient seen and examined at bedside. No acute overnight events.    MEDICATIONS  (STANDING):  apixaban 5 milliGRAM(s) Oral every 12 hours  aspirin  chewable 81 milliGRAM(s) Oral daily  chlorhexidine 2% Cloths 1 Application(s) Topical <User Schedule>  dextrose 5%. 1000 milliLiter(s) (50 mL/Hr) IV Continuous <Continuous>  dextrose 5%. 1000 milliLiter(s) (100 mL/Hr) IV Continuous <Continuous>  dextrose 50% Injectable 12.5 Gram(s) IV Push once  dextrose 50% Injectable 25 Gram(s) IV Push once  dextrose 50% Injectable 25 Gram(s) IV Push once  folic acid 1 milliGRAM(s) Oral daily  glucagon  Injectable 1 milliGRAM(s) IntraMuscular once  insulin glargine Injectable (LANTUS) 8 Unit(s) SubCutaneous every morning  insulin lispro (ADMELOG) corrective regimen sliding scale   SubCutaneous three times a day before meals  insulin lispro (ADMELOG) corrective regimen sliding scale   SubCutaneous <User Schedule>  insulin lispro Injectable (ADMELOG) 4 Unit(s) SubCutaneous before breakfast  insulin lispro Injectable (ADMELOG) 2 Unit(s) SubCutaneous before lunch  metoprolol succinate  milliGRAM(s) Oral daily  nystatin    Suspension 333662 Unit(s) Oral three times a day  predniSONE   Tablet 30 milliGRAM(s) Oral daily  senna 2 Tablet(s) Oral at bedtime  tacrolimus 1.5 milliGRAM(s) Oral <User Schedule>  tacrolimus 1 milliGRAM(s) Oral <User Schedule>  trimethoprim   80 mG/sulfamethoxazole 400 mG 1 Tablet(s) Oral <User Schedule>  valGANciclovir 450 milliGRAM(s) Oral <User Schedule>    MEDICATIONS  (PRN):  acetaminophen     Tablet .. 650 milliGRAM(s) Oral every 6 hours PRN Temp greater or equal to 38C (100.4F), Mild Pain (1 - 3)  aluminum hydroxide/magnesium hydroxide/simethicone Suspension 30 milliLiter(s) Oral every 4 hours PRN Dyspepsia  dextrose Oral Gel 15 Gram(s) Oral once PRN Blood Glucose LESS THAN 70 milliGRAM(s)/deciliter      CAPILLARY BLOOD GLUCOSE      POCT Blood Glucose.: 101 mg/dL (20 Jun 2023 02:14)  POCT Blood Glucose.: 120 mg/dL (19 Jun 2023 21:42)  POCT Blood Glucose.: 161 mg/dL (19 Jun 2023 16:54)  POCT Blood Glucose.: 194 mg/dL (19 Jun 2023 11:33)  POCT Blood Glucose.: 189 mg/dL (19 Jun 2023 07:57)    I&O's Summary    19 Jun 2023 07:01  -  20 Jun 2023 07:00  --------------------------------------------------------  IN: 360 mL / OUT: 300 mL / NET: 60 mL        PHYSICAL EXAM:  Vital Signs Last 24 Hrs  T(C): 36.4 (20 Jun 2023 04:42), Max: 36.6 (19 Jun 2023 11:39)  T(F): 97.5 (20 Jun 2023 04:42), Max: 97.9 (19 Jun 2023 11:39)  HR: 96 (20 Jun 2023 04:42) (96 - 106)  BP: 110/76 (20 Jun 2023 04:42) (110/76 - 130/90)  BP(mean): --  RR: 18 (20 Jun 2023 04:42) (18 - 18)  SpO2: 97% (20 Jun 2023 04:42) (97% - 100%)    Parameters below as of 20 Jun 2023 04:42  Patient On (Oxygen Delivery Method): room air      GENERAL: NAD  HEENT: NCAT, PERRLA, EOMI, no scleral icterus, no LAD  LUNG: CTABL; No wheezes, crackles, or rhonchi  HEART: RRR; normal S1/S2; No murmurs, rubs, or gallops  ABDOMEN: +BS, soft, nontender, nondistended  EXTREMITIES:  No LE edema b/l, 2+ Peripheral Pulses, No clubbing or cyanosis  NEUROLOGY: AOx3, non-focal, sensation intact  PSYCH: calm and cooperative  SKIN: No rashes or lesions    LABS:                        9.4    7.30  )-----------( 457      ( 19 Jun 2023 10:09 )             32.2     06-19    138  |  103  |  54<H>  ----------------------------<  185<H>  5.1   |  18<L>  |  2.47<H>    Ca    8.5      19 Jun 2023 10:09  Phos  2.2     06-19  Mg     2.4     06-19                  RADIOLOGY & ADDITIONAL TESTS:  Results Reviewed:   Imaging Personally Reviewed:  Electrocardiogram Personally Reviewed:    COORDINATION OF CARE:  Care Discussed with Consultants/Other Providers [Y/N]:  Prior or Outpatient Records Reviewed [Y/N]:   ***************************************************************  Dale Riley MD (PGY-1)  Internal Medicine  Pager: [phone]  ***************************************************************    PROGRESS NOTE:     Patient is a 73y old  Male who presents with a chief complaint of Hypoglycemia (19 Jun 2023 15:04)      SUBJECTIVE / OVERNIGHT EVENTS: Patient seen and examined at bedside. No acute overnight events. Patient denies SOB, HA, dizziness, CP.    MEDICATIONS  (STANDING):  apixaban 5 milliGRAM(s) Oral every 12 hours  aspirin  chewable 81 milliGRAM(s) Oral daily  chlorhexidine 2% Cloths 1 Application(s) Topical <User Schedule>  dextrose 5%. 1000 milliLiter(s) (50 mL/Hr) IV Continuous <Continuous>  dextrose 5%. 1000 milliLiter(s) (100 mL/Hr) IV Continuous <Continuous>  dextrose 50% Injectable 12.5 Gram(s) IV Push once  dextrose 50% Injectable 25 Gram(s) IV Push once  dextrose 50% Injectable 25 Gram(s) IV Push once  folic acid 1 milliGRAM(s) Oral daily  glucagon  Injectable 1 milliGRAM(s) IntraMuscular once  insulin glargine Injectable (LANTUS) 8 Unit(s) SubCutaneous every morning  insulin lispro (ADMELOG) corrective regimen sliding scale   SubCutaneous three times a day before meals  insulin lispro (ADMELOG) corrective regimen sliding scale   SubCutaneous <User Schedule>  insulin lispro Injectable (ADMELOG) 4 Unit(s) SubCutaneous before breakfast  insulin lispro Injectable (ADMELOG) 2 Unit(s) SubCutaneous before lunch  metoprolol succinate  milliGRAM(s) Oral daily  nystatin    Suspension [id number] Unit(s) Oral three times a day  predniSONE   Tablet 30 milliGRAM(s) Oral daily  senna 2 Tablet(s) Oral at bedtime  tacrolimus 1.5 milliGRAM(s) Oral <User Schedule>  tacrolimus 1 milliGRAM(s) Oral <User Schedule>  trimethoprim   80 mG/sulfamethoxazole 400 mG 1 Tablet(s) Oral <User Schedule>  valGANciclovir 450 milliGRAM(s) Oral <User Schedule>    MEDICATIONS  (PRN):  acetaminophen     Tablet .. 650 milliGRAM(s) Oral every 6 hours PRN Temp greater or equal to 38C (100.4F), Mild Pain (1 - 3)  aluminum hydroxide/magnesium hydroxide/simethicone Suspension 30 milliLiter(s) Oral every 4 hours PRN Dyspepsia  dextrose Oral Gel 15 Gram(s) Oral once PRN Blood Glucose LESS THAN 70 milliGRAM(s)/deciliter      CAPILLARY BLOOD GLUCOSE      POCT Blood Glucose.: 101 mg/dL (20 Jun 2023 02:14)  POCT Blood Glucose.: 120 mg/dL (19 Jun 2023 21:42)  POCT Blood Glucose.: 161 mg/dL (19 Jun 2023 16:54)  POCT Blood Glucose.: 194 mg/dL (19 Jun 2023 11:33)  POCT Blood Glucose.: 189 mg/dL (19 Jun 2023 07:57)    I&O's Summary    19 Jun 2023 07:01  -  20 Jun 2023 07:00  --------------------------------------------------------  IN: 360 mL / OUT: 300 mL / NET: 60 mL        PHYSICAL EXAM:  Vital Signs Last 24 Hrs  T(C): 36.4 (20 Jun 2023 04:42), Max: 36.6 (19 Jun 2023 11:39)  T(F): 97.5 (20 Jun 2023 04:42), Max: 97.9 (19 Jun 2023 11:39)  HR: 96 (20 Jun 2023 04:42) (96 - 106)  BP: 110/76 (20 Jun 2023 04:42) (110/76 - 130/90)  BP(mean): --  RR: 18 (20 Jun 2023 04:42) (18 - 18)  SpO2: 97% (20 Jun 2023 04:42) (97% - 100%)    Parameters below as of 20 Jun 2023 04:42  Patient On (Oxygen Delivery Method): room air      GENERAL: NAD  HEENT: NCAT, PERRLA, EOMI, no scleral icterus, no LAD  LUNG: CTABL; No wheezes, crackles, or rhonchi  HEART: RRR; normal S1/S2; No murmurs, rubs, or gallops  ABDOMEN: +BS, soft, nontender, nondistended  EXTREMITIES:  No LE edema b/l, 2+ Peripheral Pulses, No clubbing or cyanosis  NEUROLOGY: AOx3, non-focal, sensation intact  SKIN: No rashes or lesions    LABS:                        9.4    7.30  )-----------( 457      ( 19 Jun 2023 10:09 )             32.2     06-19    138  |  103  |  54<H>  ----------------------------<  185<H>  5.1   |  18<L>  |  2.47<H>    Ca    8.5      19 Jun 2023 10:09  Phos  2.2     06-19  Mg     2.4     06-19                  RADIOLOGY & ADDITIONAL TESTS:  Results Reviewed:   Imaging Personally Reviewed:  Electrocardiogram Personally Reviewed:    COORDINATION OF CARE:  Care Discussed with Consultants/Other Providers [Y/N]:  Prior or Outpatient Records Reviewed [Y/N]:

## 2023-06-21 LAB
ALBUMIN SERPL ELPH-MCNC: 4 G/DL — SIGNIFICANT CHANGE UP (ref 3.3–5)
ALP SERPL-CCNC: 51 U/L — SIGNIFICANT CHANGE UP (ref 40–120)
ALT FLD-CCNC: 10 U/L — SIGNIFICANT CHANGE UP (ref 10–45)
ANION GAP SERPL CALC-SCNC: 18 MMOL/L — HIGH (ref 5–17)
AST SERPL-CCNC: 16 U/L — SIGNIFICANT CHANGE UP (ref 10–40)
BILIRUB SERPL-MCNC: 0.4 MG/DL — SIGNIFICANT CHANGE UP (ref 0.2–1.2)
BUN SERPL-MCNC: 36 MG/DL — HIGH (ref 7–23)
CALCIUM SERPL-MCNC: 8.7 MG/DL — SIGNIFICANT CHANGE UP (ref 8.4–10.5)
CHLORIDE SERPL-SCNC: 103 MMOL/L — SIGNIFICANT CHANGE UP (ref 96–108)
CO2 SERPL-SCNC: 22 MMOL/L — SIGNIFICANT CHANGE UP (ref 22–31)
CREAT SERPL-MCNC: 1.74 MG/DL — HIGH (ref 0.5–1.3)
CULTURE RESULTS: SIGNIFICANT CHANGE UP
CULTURE RESULTS: SIGNIFICANT CHANGE UP
EGFR: 41 ML/MIN/1.73M2 — LOW
GLUCOSE BLDC GLUCOMTR-MCNC: 113 MG/DL — HIGH (ref 70–99)
GLUCOSE BLDC GLUCOMTR-MCNC: 119 MG/DL — HIGH (ref 70–99)
GLUCOSE BLDC GLUCOMTR-MCNC: 136 MG/DL — HIGH (ref 70–99)
GLUCOSE BLDC GLUCOMTR-MCNC: 201 MG/DL — HIGH (ref 70–99)
GLUCOSE BLDC GLUCOMTR-MCNC: 85 MG/DL — SIGNIFICANT CHANGE UP (ref 70–99)
GLUCOSE SERPL-MCNC: 186 MG/DL — HIGH (ref 70–99)
HCT VFR BLD CALC: 31.3 % — LOW (ref 39–50)
HGB BLD-MCNC: 9.1 G/DL — LOW (ref 13–17)
MAGNESIUM SERPL-MCNC: 2.2 MG/DL — SIGNIFICANT CHANGE UP (ref 1.6–2.6)
MCHC RBC-ENTMCNC: 29.1 GM/DL — LOW (ref 32–36)
MCHC RBC-ENTMCNC: 29.5 PG — SIGNIFICANT CHANGE UP (ref 27–34)
MCV RBC AUTO: 101.6 FL — HIGH (ref 80–100)
NRBC # BLD: 1 /100 WBCS — HIGH (ref 0–0)
PHOSPHATE SERPL-MCNC: 2.4 MG/DL — LOW (ref 2.5–4.5)
PLATELET # BLD AUTO: 520 K/UL — HIGH (ref 150–400)
POTASSIUM SERPL-MCNC: 4.9 MMOL/L — SIGNIFICANT CHANGE UP (ref 3.5–5.3)
POTASSIUM SERPL-SCNC: 4.9 MMOL/L — SIGNIFICANT CHANGE UP (ref 3.5–5.3)
PROT SERPL-MCNC: 6.5 G/DL — SIGNIFICANT CHANGE UP (ref 6–8.3)
RBC # BLD: 3.08 M/UL — LOW (ref 4.2–5.8)
RBC # FLD: 17.3 % — HIGH (ref 10.3–14.5)
SARS-COV-2 RNA SPEC QL NAA+PROBE: SIGNIFICANT CHANGE UP
SODIUM SERPL-SCNC: 143 MMOL/L — SIGNIFICANT CHANGE UP (ref 135–145)
SPECIMEN SOURCE: SIGNIFICANT CHANGE UP
SPECIMEN SOURCE: SIGNIFICANT CHANGE UP
TACROLIMUS SERPL-MCNC: 2.4 NG/ML — SIGNIFICANT CHANGE UP
WBC # BLD: 8.23 K/UL — SIGNIFICANT CHANGE UP (ref 3.8–10.5)
WBC # FLD AUTO: 8.23 K/UL — SIGNIFICANT CHANGE UP (ref 3.8–10.5)

## 2023-06-21 PROCEDURE — 99232 SBSQ HOSP IP/OBS MODERATE 35: CPT

## 2023-06-21 PROCEDURE — 99232 SBSQ HOSP IP/OBS MODERATE 35: CPT | Mod: GC

## 2023-06-21 RX ORDER — INSULIN LISPRO 100/ML
7 VIAL (ML) SUBCUTANEOUS
Refills: 0 | Status: DISCONTINUED | OUTPATIENT
Start: 2023-06-22 | End: 2023-06-23

## 2023-06-21 RX ADMIN — Medication 81 MILLIGRAM(S): at 12:05

## 2023-06-21 RX ADMIN — Medication 500000 UNIT(S): at 12:05

## 2023-06-21 RX ADMIN — CHLORHEXIDINE GLUCONATE 1 APPLICATION(S): 213 SOLUTION TOPICAL at 08:27

## 2023-06-21 RX ADMIN — Medication 2 UNIT(S): at 12:04

## 2023-06-21 RX ADMIN — TACROLIMUS 2 MILLIGRAM(S): 5 CAPSULE ORAL at 21:25

## 2023-06-21 RX ADMIN — INSULIN GLARGINE 8 UNIT(S): 100 INJECTION, SOLUTION SUBCUTANEOUS at 08:39

## 2023-06-21 RX ADMIN — Medication 500000 UNIT(S): at 21:24

## 2023-06-21 RX ADMIN — TACROLIMUS 2 MILLIGRAM(S): 5 CAPSULE ORAL at 09:57

## 2023-06-21 RX ADMIN — Medication 500000 UNIT(S): at 05:37

## 2023-06-21 RX ADMIN — Medication 30 MILLIGRAM(S): at 05:37

## 2023-06-21 RX ADMIN — APIXABAN 5 MILLIGRAM(S): 2.5 TABLET, FILM COATED ORAL at 17:18

## 2023-06-21 RX ADMIN — Medication 1 MILLIGRAM(S): at 12:06

## 2023-06-21 RX ADMIN — Medication 100 MILLIGRAM(S): at 05:37

## 2023-06-21 RX ADMIN — APIXABAN 5 MILLIGRAM(S): 2.5 TABLET, FILM COATED ORAL at 05:37

## 2023-06-21 RX ADMIN — Medication 1 TABLET(S): at 08:41

## 2023-06-21 RX ADMIN — Medication 2: at 12:04

## 2023-06-21 RX ADMIN — Medication 30 MILLILITER(S): at 21:23

## 2023-06-21 RX ADMIN — Medication 6 UNIT(S): at 08:39

## 2023-06-21 NOTE — PROGRESS NOTE ADULT - PROBLEM SELECTOR PLAN 6
Post-transplant pAF/AFl  -Continue apixaban, metoprolol  -Currently in SR Post-transplant pAF/AFl  -Continue apixaban, metoprolol

## 2023-06-21 NOTE — PROGRESS NOTE ADULT - PROBLEM SELECTOR PLAN 9
Diet: DASH with CC   Dispo: JP  DVT: On Eliquis  Code: FULL    PPX given immunosuppresion:   CMV: valgancyclovir 450 twice weekly (renal dosing)  Toxo/PCP: bactrim 400-80 PO three times per week  CAV: pravastatin 20mg daily, ASA 81mg   Fungal: nystatin

## 2023-06-21 NOTE — PROGRESS NOTE ADULT - SUBJECTIVE AND OBJECTIVE BOX
Seen earlier today     Chief Complaint: Diabetes Mellitus follow up    INTERVAL HX: Tolerating diet. Fasting  with random BGs in 100s - 201. Pre-lunch BGs above goal       Review of Systems:  General: " feel good"  GI: No nausea, vomiting, tolerating POs with good appetite  Endocrine: no  S&Sx of hypoglycemia    Allergies    No Known Allergies    Intolerances      MEDICATIONS  (STANDING):  dextrose 5%. 1000 milliLiter(s) (50 mL/Hr) IV Continuous <Continuous>  dextrose 5%. 1000 milliLiter(s) (100 mL/Hr) IV Continuous <Continuous>  dextrose 50% Injectable 25 Gram(s) IV Push once  dextrose 50% Injectable 25 Gram(s) IV Push once  dextrose 50% Injectable 12.5 Gram(s) IV Push once  glucagon  Injectable 1 milliGRAM(s) IntraMuscular once  insulin glargine Injectable (LANTUS) 8 Unit(s) SubCutaneous every morning  insulin lispro (ADMELOG) corrective regimen sliding scale   SubCutaneous three times a day before meals  insulin lispro (ADMELOG) corrective regimen sliding scale   SubCutaneous <User Schedule>  insulin lispro Injectable (ADMELOG) 2 Unit(s) SubCutaneous before lunch  insulin lispro Injectable (ADMELOG) 6 Unit(s) SubCutaneous before breakfast  metoprolol succinate  milliGRAM(s) Oral daily  predniSONE   Tablet 30 milliGRAM(s) Oral daily  tacrolimus 2 milliGRAM(s) Oral <User Schedule>  trimethoprim   80 mG/sulfamethoxazole 400 mG 1 Tablet(s) Oral <User Schedule>  valGANciclovir 450 milliGRAM(s) Oral <User Schedule>        insulin glargine Injectable (LANTUS)   8 Unit(s) SubCutaneous (06-21-23 @ 08:39)    insulin lispro (ADMELOG) corrective regimen sliding scale   2 Unit(s) SubCutaneous (06-21-23 @ 12:04)    insulin lispro Injectable (ADMELOG)   2 Unit(s) SubCutaneous (06-21-23 @ 12:04)    insulin lispro Injectable (ADMELOG)   6 Unit(s) SubCutaneous (06-21-23 @ 08:39)    predniSONE   Tablet   30 milliGRAM(s) Oral (06-21-23 @ 05:37)        PHYSICAL EXAM:  VITALS: T(C): 36.7 (06-21-23 @ 11:19)  T(F): 98.1 (06-21-23 @ 11:19), Max: 98.1 (06-21-23 @ 11:19)  HR: 105 (06-21-23 @ 11:19) (105 - 105)  BP: 112/75 (06-21-23 @ 11:19) (112/75 - 134/87)  RR:  (17 - 18)  SpO2:  (97% - 100%)  Wt(kg): --  GENERAL: male sitting in bed, in NAD  Respiratory: Respirations unlabored   Extremities: Warm, no edema  NEURO: Alert , appropriate     LABS:  POCT Blood Glucose.: 201 mg/dL (06-21-23 @ 11:57)  POCT Blood Glucose.: 136 mg/dL (06-21-23 @ 08:17)  POCT Blood Glucose.: 119 mg/dL (06-21-23 @ 02:47)  POCT Blood Glucose.: 103 mg/dL (06-20-23 @ 21:44)  POCT Blood Glucose.: 95 mg/dL (06-20-23 @ 17:05)  POCT Blood Glucose.: 275 mg/dL (06-20-23 @ 11:52)  POCT Blood Glucose.: 215 mg/dL (06-20-23 @ 08:20)  POCT Blood Glucose.: 101 mg/dL (06-20-23 @ 02:14)  POCT Blood Glucose.: 120 mg/dL (06-19-23 @ 21:42)  POCT Blood Glucose.: 161 mg/dL (06-19-23 @ 16:54)  POCT Blood Glucose.: 194 mg/dL (06-19-23 @ 11:33)  POCT Blood Glucose.: 189 mg/dL (06-19-23 @ 07:57)  POCT Blood Glucose.: 108 mg/dL (06-19-23 @ 05:18)  POCT Blood Glucose.: 159 mg/dL (06-18-23 @ 21:19)  POCT Blood Glucose.: 98 mg/dL (06-18-23 @ 16:55)                          9.1    8.23  )-----------( 520      ( 21 Jun 2023 10:42 )             31.3     06-21    143  |  103  |  36<H>  ----------------------------<  186<H>  4.9   |  22  |  1.74<H>    Ca    8.7      21 Jun 2023 10:43  Phos  2.4     06-21  Mg     2.2     06-21    TPro  6.5  /  Alb  4.0  /  TBili  0.4  /  DBili  x   /  AST  16  /  ALT  10  /  AlkPhos  51  06-21    eGFR: 41 mL/min/1.73m2 (21 Jun 2023 10:43)    06-15 Chol 112 Direct LDL -- LDL calculated 41 HDL 45 Trig 128  Thyroid Function Tests:      A1C with Estimated Average Glucose Result: 8.6 % (05-24-23 @ 06:00)    Estimated Average Glucose: 200 mg/dL (05-24-23 @ 06:00)        Diet, DASH/TLC:   Sodium & Cholesterol Restricted  Consistent Carbohydrate Evening Snack (CSTCHOSN)  No Concentrated Potassium (06-16-23 @ 14:17) [Active]

## 2023-06-21 NOTE — PROGRESS NOTE ADULT - ATTENDING COMMENTS
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     #Hypoglycemia- resolved. Endocrine f/u appreciated blood glucose is has been more tightly controlled. will  c/w lantus 8U and decrease admelog to 7u before breakfast and 2 units before lunch and ISS for bedtime.  #Heart Transplant Recipient - continue to dose tacro for goal 6-8, tacro level is below goal (2.8) so tacro was increased from 1mg in the AM and 1.5mg in the PM --> to tacro 2mg in the am and 2 mg in the pm; we are awaiting todays level for dose adjustmements. c/w ppx  bactrim (PCP ppm) and Valganciclovir (CMV ppx)  #NORMAN on CKD stage 4- Cr is improving. Creatinine Trend: 1.74<--, 1.94<--, 2.47<--, 3.23<--, 3.69<--, 3.71<--    d/c planning in progress.  d/c to [facility] when bed is available

## 2023-06-21 NOTE — PROGRESS NOTE ADULT - PROBLEM SELECTOR PLAN 5
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 Tacrolimus level daily before dose; admission level in specimen received.   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 Tacrolimus level daily before dose.   - He is off cellcept chronically while on prednisone for hemolytic anemia  - Continue Bactrim, valganciclovir, nystatin  - C/w aspirin  - TTE 6/16 - EF 56%  - Transplant following

## 2023-06-21 NOTE — PROGRESS NOTE ADULT - PROBLEM SELECTOR PLAN 3
Was on pravastatin 20mg   Continue statin therapy if no contraindication. Per primary team.        Can be reached via TEAMS  office:  [phone] (M-F 9a-5pm)               [phone] (nights/weekends)   Can access Amion coverage via sunrise/tools

## 2023-06-21 NOTE — PROGRESS NOTE ADULT - PROBLEM SELECTOR PLAN 1
-test BG AC/HS  -Continue Lantus 8 units QAM  -Will increase Admelog to 7 units w/breakfast and continue 2 units w/lunch  -Continue Admelog low correction scale AC and low HS scale  -cons carb diet  -On Prednisone 30mg>notify endocrine if this dose adjusted.  -Keep hypoglycemia protocol in place   Discharge planning :   - Discharge plan to rehab- can continue current dose basal  & bolus with correction scale ( Lantus 8 units in am,  Admelog 7 units before breakfast and Admelog 2 units before lunch, to hold premeal insulin if not eating).   - Test BGs ACTID at HS and Insulin dose to be adjusted at reaab  - Insulin management safety to be reassessed at the time of discharge from rehab. Suggested to God brother for patient to take oral medications when the home aid is present or when he is present to ensure appropriate administration. Needs assistance at home  - Ophthalmology and podiatry follow up outpatient   - Has follow up outpatient appointment with Endocrine Dr. Clemens Aug 15th at 10:45 at [address] [facility] [address] [place].

## 2023-06-21 NOTE — PROGRESS NOTE ADULT - SUBJECTIVE AND OBJECTIVE BOX
***************************************************************  Dale Riley MD (PGY-1)  Internal Medicine  Pager: [phone]  ***************************************************************    PROGRESS NOTE:     Patient is a 73y old  Male who presents with a chief complaint of Hypoglycemia (20 Jun 2023 13:02)      SUBJECTIVE / OVERNIGHT EVENTS: Patient seen and examined at bedside. No acute overnight events.     MEDICATIONS  (STANDING):  apixaban 5 milliGRAM(s) Oral every 12 hours  aspirin  chewable 81 milliGRAM(s) Oral daily  chlorhexidine 2% Cloths 1 Application(s) Topical <User Schedule>  dextrose 5%. 1000 milliLiter(s) (50 mL/Hr) IV Continuous <Continuous>  dextrose 5%. 1000 milliLiter(s) (100 mL/Hr) IV Continuous <Continuous>  dextrose 50% Injectable 25 Gram(s) IV Push once  dextrose 50% Injectable 25 Gram(s) IV Push once  dextrose 50% Injectable 12.5 Gram(s) IV Push once  folic acid 1 milliGRAM(s) Oral daily  glucagon  Injectable 1 milliGRAM(s) IntraMuscular once  insulin glargine Injectable (LANTUS) 8 Unit(s) SubCutaneous every morning  insulin lispro (ADMELOG) corrective regimen sliding scale   SubCutaneous three times a day before meals  insulin lispro (ADMELOG) corrective regimen sliding scale   SubCutaneous <User Schedule>  insulin lispro Injectable (ADMELOG) 2 Unit(s) SubCutaneous before lunch  insulin lispro Injectable (ADMELOG) 6 Unit(s) SubCutaneous before breakfast  metoprolol succinate  milliGRAM(s) Oral daily  nystatin    Suspension 673073 Unit(s) Oral three times a day  predniSONE   Tablet 30 milliGRAM(s) Oral daily  senna 2 Tablet(s) Oral at bedtime  tacrolimus 2 milliGRAM(s) Oral <User Schedule>  trimethoprim   80 mG/sulfamethoxazole 400 mG 1 Tablet(s) Oral <User Schedule>  valGANciclovir 450 milliGRAM(s) Oral <User Schedule>    MEDICATIONS  (PRN):  acetaminophen     Tablet .. 650 milliGRAM(s) Oral every 6 hours PRN Temp greater or equal to 38C (100.4F), Mild Pain (1 - 3)  aluminum hydroxide/magnesium hydroxide/simethicone Suspension 30 milliLiter(s) Oral every 4 hours PRN Dyspepsia  dextrose Oral Gel 15 Gram(s) Oral once PRN Blood Glucose LESS THAN 70 milliGRAM(s)/deciliter      CAPILLARY BLOOD GLUCOSE      POCT Blood Glucose.: 119 mg/dL (21 Jun 2023 02:47)  POCT Blood Glucose.: 103 mg/dL (20 Jun 2023 21:44)  POCT Blood Glucose.: 95 mg/dL (20 Jun 2023 17:05)  POCT Blood Glucose.: 275 mg/dL (20 Jun 2023 11:52)  POCT Blood Glucose.: 215 mg/dL (20 Jun 2023 08:20)    I&O's Summary    20 Jun 2023 07:01  -  21 Jun 2023 07:00  --------------------------------------------------------  IN: 0 mL / OUT: 1100 mL / NET: -1100 mL        PHYSICAL EXAM:  Vital Signs Last 24 Hrs  T(C): 36.4 (21 Jun 2023 04:47), Max: 36.5 (20 Jun 2023 11:39)  T(F): 97.6 (21 Jun 2023 04:47), Max: 97.7 (20 Jun 2023 11:39)  HR: 105 (21 Jun 2023 04:47) (105 - 107)  BP: 134/87 (21 Jun 2023 04:47) (108/76 - 134/87)  BP(mean): --  RR: 18 (21 Jun 2023 04:47) (18 - 18)  SpO2: 100% (21 Jun 2023 04:47) (98% - 100%)    Parameters below as of 21 Jun 2023 04:47  Patient On (Oxygen Delivery Method): room air      GENERAL: NAD  HEENT: NCAT, PERRLA, EOMI, no scleral icterus, no LAD  LUNG: CTABL; No wheezes, crackles, or rhonchi  HEART: RRR; normal S1/S2; No murmurs, rubs, or gallops  ABDOMEN: +BS, soft, nontender, nondistended  EXTREMITIES:  No LE edema b/l, 2+ Peripheral Pulses, No clubbing or cyanosis  NEUROLOGY: AOx3, non-focal, sensation intact  SKIN: No rashes or lesions    LABS:                        9.0    8.61  )-----------( 508      ( 20 Jun 2023 10:06 )             30.4     06-20    138  |  105  |  44<H>  ----------------------------<  140<H>  5.0   |  21<L>  |  1.94<H>    Ca    8.7      20 Jun 2023 10:06  Phos  2.4     06-20  Mg     2.4     06-20    TPro  6.4  /  Alb  3.8  /  TBili  0.3  /  DBili  x   /  AST  12  /  ALT  12  /  AlkPhos  47  06-20    PT/INR - ( 20 Jun 2023 10:06 )   PT: 17.9 sec;   INR: 1.55 ratio         PTT - ( 20 Jun 2023 10:06 )  PTT:33.6 sec            RADIOLOGY & ADDITIONAL TESTS:  Results Reviewed:   Imaging Personally Reviewed:  Electrocardiogram Personally Reviewed:    COORDINATION OF CARE:  Care Discussed with Consultants/Other Providers [Y/N]:  Prior or Outpatient Records Reviewed [Y/N]:   ***************************************************************  aDle Riley MD (PGY-1)  Internal Medicine  Pager: [phone]  ***************************************************************    PROGRESS NOTE:     Patient is a 73y old  Male who presents with a chief complaint of Hypoglycemia (20 Jun 2023 13:02)      SUBJECTIVE / OVERNIGHT EVENTS: Patient seen and examined at bedside. Patient denies dizziness, HA and abdominal pain this morning.    MEDICATIONS  (STANDING):  apixaban 5 milliGRAM(s) Oral every 12 hours  aspirin  chewable 81 milliGRAM(s) Oral daily  chlorhexidine 2% Cloths 1 Application(s) Topical <User Schedule>  dextrose 5%. 1000 milliLiter(s) (50 mL/Hr) IV Continuous <Continuous>  dextrose 5%. 1000 milliLiter(s) (100 mL/Hr) IV Continuous <Continuous>  dextrose 50% Injectable 25 Gram(s) IV Push once  dextrose 50% Injectable 25 Gram(s) IV Push once  dextrose 50% Injectable 12.5 Gram(s) IV Push once  folic acid 1 milliGRAM(s) Oral daily  glucagon  Injectable 1 milliGRAM(s) IntraMuscular once  insulin glargine Injectable (LANTUS) 8 Unit(s) SubCutaneous every morning  insulin lispro (ADMELOG) corrective regimen sliding scale   SubCutaneous three times a day before meals  insulin lispro (ADMELOG) corrective regimen sliding scale   SubCutaneous <User Schedule>  insulin lispro Injectable (ADMELOG) 2 Unit(s) SubCutaneous before lunch  insulin lispro Injectable (ADMELOG) 6 Unit(s) SubCutaneous before breakfast  metoprolol succinate  milliGRAM(s) Oral daily  nystatin    Suspension 913803 Unit(s) Oral three times a day  predniSONE   Tablet 30 milliGRAM(s) Oral daily  senna 2 Tablet(s) Oral at bedtime  tacrolimus 2 milliGRAM(s) Oral <User Schedule>  trimethoprim   80 mG/sulfamethoxazole 400 mG 1 Tablet(s) Oral <User Schedule>  valGANciclovir 450 milliGRAM(s) Oral <User Schedule>    MEDICATIONS  (PRN):  acetaminophen     Tablet .. 650 milliGRAM(s) Oral every 6 hours PRN Temp greater or equal to 38C (100.4F), Mild Pain (1 - 3)  aluminum hydroxide/magnesium hydroxide/simethicone Suspension 30 milliLiter(s) Oral every 4 hours PRN Dyspepsia  dextrose Oral Gel 15 Gram(s) Oral once PRN Blood Glucose LESS THAN 70 milliGRAM(s)/deciliter      CAPILLARY BLOOD GLUCOSE      POCT Blood Glucose.: 119 mg/dL (21 Jun 2023 02:47)  POCT Blood Glucose.: 103 mg/dL (20 Jun 2023 21:44)  POCT Blood Glucose.: 95 mg/dL (20 Jun 2023 17:05)  POCT Blood Glucose.: 275 mg/dL (20 Jun 2023 11:52)  POCT Blood Glucose.: 215 mg/dL (20 Jun 2023 08:20)    I&O's Summary    20 Jun 2023 07:01  -  21 Jun 2023 07:00  --------------------------------------------------------  IN: 0 mL / OUT: 1100 mL / NET: -1100 mL        PHYSICAL EXAM:  Vital Signs Last 24 Hrs  T(C): 36.4 (21 Jun 2023 04:47), Max: 36.5 (20 Jun 2023 11:39)  T(F): 97.6 (21 Jun 2023 04:47), Max: 97.7 (20 Jun 2023 11:39)  HR: 105 (21 Jun 2023 04:47) (105 - 107)  BP: 134/87 (21 Jun 2023 04:47) (108/76 - 134/87)  BP(mean): --  RR: 18 (21 Jun 2023 04:47) (18 - 18)  SpO2: 100% (21 Jun 2023 04:47) (98% - 100%)    Parameters below as of 21 Jun 2023 04:47  Patient On (Oxygen Delivery Method): room air      GENERAL: NAD  HEENT: NCAT, PERRLA, EOMI, no scleral icterus, no LAD  LUNG: CTABL; No wheezes, crackles, or rhonchi  HEART: RRR; normal S1/S2; No murmurs, rubs, or gallops  ABDOMEN: +BS, soft, nontender, nondistended  EXTREMITIES:  No LE edema b/l, 2+ Peripheral Pulses, No clubbing or cyanosis  NEUROLOGY: AOx3, non-focal, sensation intact  SKIN: No rashes or lesions    LABS:                        9.0    8.61  )-----------( 508      ( 20 Jun 2023 10:06 )             30.4     06-20    138  |  105  |  44<H>  ----------------------------<  140<H>  5.0   |  21<L>  |  1.94<H>    Ca    8.7      20 Jun 2023 10:06  Phos  2.4     06-20  Mg     2.4     06-20    TPro  6.4  /  Alb  3.8  /  TBili  0.3  /  DBili  x   /  AST  12  /  ALT  12  /  AlkPhos  47  06-20    PT/INR - ( 20 Jun 2023 10:06 )   PT: 17.9 sec;   INR: 1.55 ratio         PTT - ( 20 Jun 2023 10:06 )  PTT:33.6 sec            RADIOLOGY & ADDITIONAL TESTS:  Results Reviewed:   Imaging Personally Reviewed:  Electrocardiogram Personally Reviewed:    COORDINATION OF CARE:  Care Discussed with Consultants/Other Providers [Y/N]:  Prior or Outpatient Records Reviewed [Y/N]:

## 2023-06-21 NOTE — PROGRESS NOTE ADULT - ASSESSMENT
73 year old male, with past history of HTN, HLD and T2DM on insulin, complicated by steroid-induced hyperglycemia, presents with symptomatic hypoglycemia in the setting of reduced oral intake and possibly missing steroid doses.  Endocrine Following for hypoglycemia. BG Goal 100-180mg/dl.   Fasting  with random BGs in 100s - 201. Pre-lunch BGs above goal. Will be cautious changing Lantus dose to avoid night time hypoglycemia, will adjust premeal insulin dose.     Home regiment: lantus 20 units in the morning 9am, admelog 30/14/4   HgbA1C 8.6 %( 5/24/23)

## 2023-06-22 LAB
ALBUMIN SERPL ELPH-MCNC: 4 G/DL — SIGNIFICANT CHANGE UP (ref 3.3–5)
ALP SERPL-CCNC: 52 U/L — SIGNIFICANT CHANGE UP (ref 40–120)
ALT FLD-CCNC: 13 U/L — SIGNIFICANT CHANGE UP (ref 10–45)
ANION GAP SERPL CALC-SCNC: 11 MMOL/L — SIGNIFICANT CHANGE UP (ref 5–17)
ANION GAP SERPL CALC-SCNC: 8 MMOL/L — SIGNIFICANT CHANGE UP (ref 5–17)
AST SERPL-CCNC: 16 U/L — SIGNIFICANT CHANGE UP (ref 10–40)
BILIRUB SERPL-MCNC: 0.4 MG/DL — SIGNIFICANT CHANGE UP (ref 0.2–1.2)
BUN SERPL-MCNC: 36 MG/DL — HIGH (ref 7–23)
BUN SERPL-MCNC: 42 MG/DL — HIGH (ref 7–23)
CALCIUM SERPL-MCNC: 9.2 MG/DL — SIGNIFICANT CHANGE UP (ref 8.4–10.5)
CALCIUM SERPL-MCNC: 9.4 MG/DL — SIGNIFICANT CHANGE UP (ref 8.4–10.5)
CHLORIDE SERPL-SCNC: 105 MMOL/L — SIGNIFICANT CHANGE UP (ref 96–108)
CHLORIDE SERPL-SCNC: 105 MMOL/L — SIGNIFICANT CHANGE UP (ref 96–108)
CO2 SERPL-SCNC: 26 MMOL/L — SIGNIFICANT CHANGE UP (ref 22–31)
CO2 SERPL-SCNC: 26 MMOL/L — SIGNIFICANT CHANGE UP (ref 22–31)
CREAT SERPL-MCNC: 1.76 MG/DL — HIGH (ref 0.5–1.3)
CREAT SERPL-MCNC: 2.04 MG/DL — HIGH (ref 0.5–1.3)
EGFR: 34 ML/MIN/1.73M2 — LOW
EGFR: 40 ML/MIN/1.73M2 — LOW
GLUCOSE BLDC GLUCOMTR-MCNC: 102 MG/DL — HIGH (ref 70–99)
GLUCOSE BLDC GLUCOMTR-MCNC: 118 MG/DL — HIGH (ref 70–99)
GLUCOSE BLDC GLUCOMTR-MCNC: 125 MG/DL — HIGH (ref 70–99)
GLUCOSE BLDC GLUCOMTR-MCNC: 137 MG/DL — HIGH (ref 70–99)
GLUCOSE BLDC GLUCOMTR-MCNC: 171 MG/DL — HIGH (ref 70–99)
GLUCOSE BLDC GLUCOMTR-MCNC: 174 MG/DL — HIGH (ref 70–99)
GLUCOSE BLDC GLUCOMTR-MCNC: 201 MG/DL — HIGH (ref 70–99)
GLUCOSE SERPL-MCNC: 149 MG/DL — HIGH (ref 70–99)
GLUCOSE SERPL-MCNC: 86 MG/DL — SIGNIFICANT CHANGE UP (ref 70–99)
HCT VFR BLD CALC: 31 % — LOW (ref 39–50)
HGB BLD-MCNC: 9.1 G/DL — LOW (ref 13–17)
MCHC RBC-ENTMCNC: 29.4 GM/DL — LOW (ref 32–36)
MCHC RBC-ENTMCNC: 29.7 PG — SIGNIFICANT CHANGE UP (ref 27–34)
MCV RBC AUTO: 101.3 FL — HIGH (ref 80–100)
NRBC # BLD: 1 /100 WBCS — HIGH (ref 0–0)
PLATELET # BLD AUTO: 547 K/UL — HIGH (ref 150–400)
POTASSIUM SERPL-MCNC: 5.5 MMOL/L — HIGH (ref 3.5–5.3)
POTASSIUM SERPL-MCNC: 6.2 MMOL/L — CRITICAL HIGH (ref 3.5–5.3)
POTASSIUM SERPL-SCNC: 5.5 MMOL/L — HIGH (ref 3.5–5.3)
POTASSIUM SERPL-SCNC: 6.2 MMOL/L — CRITICAL HIGH (ref 3.5–5.3)
PROT SERPL-MCNC: 6.5 G/DL — SIGNIFICANT CHANGE UP (ref 6–8.3)
RBC # BLD: 3.06 M/UL — LOW (ref 4.2–5.8)
RBC # FLD: 17.6 % — HIGH (ref 10.3–14.5)
SODIUM SERPL-SCNC: 139 MMOL/L — SIGNIFICANT CHANGE UP (ref 135–145)
SODIUM SERPL-SCNC: 142 MMOL/L — SIGNIFICANT CHANGE UP (ref 135–145)
TACROLIMUS SERPL-MCNC: 2.3 NG/ML — SIGNIFICANT CHANGE UP
WBC # BLD: 9.31 K/UL — SIGNIFICANT CHANGE UP (ref 3.8–10.5)
WBC # FLD AUTO: 9.31 K/UL — SIGNIFICANT CHANGE UP (ref 3.8–10.5)

## 2023-06-22 PROCEDURE — 99232 SBSQ HOSP IP/OBS MODERATE 35: CPT

## 2023-06-22 PROCEDURE — 99232 SBSQ HOSP IP/OBS MODERATE 35: CPT | Mod: GC

## 2023-06-22 RX ORDER — DEXTROSE 50 % IN WATER 50 %
50 SYRINGE (ML) INTRAVENOUS ONCE
Refills: 0 | Status: COMPLETED | OUTPATIENT
Start: 2023-06-22 | End: 2023-06-22

## 2023-06-22 RX ORDER — SODIUM ZIRCONIUM CYCLOSILICATE 10 G/10G
10 POWDER, FOR SUSPENSION ORAL ONCE
Refills: 0 | Status: DISCONTINUED | OUTPATIENT
Start: 2023-06-22 | End: 2023-06-23

## 2023-06-22 RX ORDER — INSULIN HUMAN 100 [IU]/ML
10 INJECTION, SOLUTION SUBCUTANEOUS ONCE
Refills: 0 | Status: COMPLETED | OUTPATIENT
Start: 2023-06-22 | End: 2023-06-22

## 2023-06-22 RX ORDER — SODIUM ZIRCONIUM CYCLOSILICATE 10 G/10G
10 POWDER, FOR SUSPENSION ORAL
Refills: 0 | Status: DISCONTINUED | OUTPATIENT
Start: 2023-06-22 | End: 2023-06-23

## 2023-06-22 RX ORDER — INSULIN HUMAN 100 [IU]/ML
10 INJECTION, SOLUTION SUBCUTANEOUS ONCE
Refills: 0 | Status: DISCONTINUED | OUTPATIENT
Start: 2023-06-22 | End: 2023-06-22

## 2023-06-22 RX ORDER — SODIUM ZIRCONIUM CYCLOSILICATE 10 G/10G
10 POWDER, FOR SUSPENSION ORAL ONCE
Refills: 0 | Status: COMPLETED | OUTPATIENT
Start: 2023-06-22 | End: 2023-06-22

## 2023-06-22 RX ORDER — DEXTROSE 50 % IN WATER 50 %
50 SYRINGE (ML) INTRAVENOUS ONCE
Refills: 0 | Status: DISCONTINUED | OUTPATIENT
Start: 2023-06-22 | End: 2023-06-22

## 2023-06-22 RX ORDER — TACROLIMUS 5 MG/1
3 CAPSULE ORAL
Refills: 0 | Status: DISCONTINUED | OUTPATIENT
Start: 2023-06-23 | End: 2023-06-23

## 2023-06-22 RX ORDER — TACROLIMUS 5 MG/1
3 CAPSULE ORAL ONCE
Refills: 0 | Status: COMPLETED | OUTPATIENT
Start: 2023-06-22 | End: 2023-06-22

## 2023-06-22 RX ORDER — TACROLIMUS 5 MG/1
1 CAPSULE ORAL ONCE
Refills: 0 | Status: COMPLETED | OUTPATIENT
Start: 2023-06-22 | End: 2023-06-22

## 2023-06-22 RX ADMIN — Medication 500000 UNIT(S): at 05:11

## 2023-06-22 RX ADMIN — Medication 1 MILLIGRAM(S): at 11:49

## 2023-06-22 RX ADMIN — Medication 500000 UNIT(S): at 21:11

## 2023-06-22 RX ADMIN — Medication 30 MILLILITER(S): at 21:11

## 2023-06-22 RX ADMIN — Medication 500000 UNIT(S): at 13:06

## 2023-06-22 RX ADMIN — Medication 100 MILLIGRAM(S): at 05:10

## 2023-06-22 RX ADMIN — SODIUM ZIRCONIUM CYCLOSILICATE 10 GRAM(S): 10 POWDER, FOR SUSPENSION ORAL at 17:07

## 2023-06-22 RX ADMIN — INSULIN GLARGINE 8 UNIT(S): 100 INJECTION, SOLUTION SUBCUTANEOUS at 09:32

## 2023-06-22 RX ADMIN — TACROLIMUS 3 MILLIGRAM(S): 5 CAPSULE ORAL at 21:11

## 2023-06-22 RX ADMIN — Medication 7 UNIT(S): at 08:52

## 2023-06-22 RX ADMIN — APIXABAN 5 MILLIGRAM(S): 2.5 TABLET, FILM COATED ORAL at 05:11

## 2023-06-22 RX ADMIN — INSULIN HUMAN 10 UNIT(S): 100 INJECTION, SOLUTION SUBCUTANEOUS at 15:04

## 2023-06-22 RX ADMIN — TACROLIMUS 2 MILLIGRAM(S): 5 CAPSULE ORAL at 09:02

## 2023-06-22 RX ADMIN — Medication 30 MILLIGRAM(S): at 05:10

## 2023-06-22 RX ADMIN — Medication 81 MILLIGRAM(S): at 11:50

## 2023-06-22 RX ADMIN — SENNA PLUS 2 TABLET(S): 8.6 TABLET ORAL at 21:11

## 2023-06-22 RX ADMIN — SODIUM ZIRCONIUM CYCLOSILICATE 10 GRAM(S): 10 POWDER, FOR SUSPENSION ORAL at 13:14

## 2023-06-22 RX ADMIN — TACROLIMUS 1 MILLIGRAM(S): 5 CAPSULE ORAL at 13:16

## 2023-06-22 RX ADMIN — CHLORHEXIDINE GLUCONATE 1 APPLICATION(S): 213 SOLUTION TOPICAL at 08:59

## 2023-06-22 RX ADMIN — VALGANCICLOVIR 450 MILLIGRAM(S): 450 TABLET, FILM COATED ORAL at 09:02

## 2023-06-22 RX ADMIN — APIXABAN 5 MILLIGRAM(S): 2.5 TABLET, FILM COATED ORAL at 17:07

## 2023-06-22 RX ADMIN — Medication 50 MILLILITER(S): at 15:05

## 2023-06-22 NOTE — DIETITIAN INITIAL EVALUATION ADULT - PERTINENT LABORATORY DATA
06-22    139  |  105  |  36<H>  ----------------------------<  149<H>  6.2<HH>   |  26  |  1.76<H>    Ca    9.2      22 Jun 2023 11:57  Phos  2.4     06-21  Mg     2.2     06-21    TPro  6.5  /  Alb  4.0  /  TBili  0.4  /  DBili  x   /  AST  16  /  ALT  13  /  AlkPhos  52  06-22  POCT Blood Glucose.: 171 mg/dL (06-22-23 @ 12:07)  A1C with Estimated Average Glucose Result: 8.6 % (05-24-23 @ 06:00)  A1C with Estimated Average Glucose Result: 4.7 % (02-17-23 @ 11:59)

## 2023-06-22 NOTE — DIETITIAN INITIAL EVALUATION ADULT - REASON INDICATOR FOR ASSESSMENT
Initial Nutrition Assessment for Length Of Stay on 2DSU  Source: patient, electronic medical record

## 2023-06-22 NOTE — DIETITIAN INITIAL EVALUATION ADULT - NS FNS DIET ORDER
Diet, DASH/TLC:   Sodium & Cholesterol Restricted  Consistent Carbohydrate {Evening Snack} (CSTCHOSN)  No Concentrated Potassium (06-16-23 @ 14:17) [Active]

## 2023-06-22 NOTE — DIETITIAN INITIAL EVALUATION ADULT - DIET TYPE
- Recommend d/c DASH restriction; consider consistent carbohydrate + No concentrated potassium diet only at this time; consider Low sodium restriction PRN

## 2023-06-22 NOTE — PROGRESS NOTE ADULT - SUBJECTIVE AND OBJECTIVE BOX
***************************************************************  Dale Riley MD (PGY-1)  Internal Medicine  Pager: [phone]  ***************************************************************    PROGRESS NOTE:     Patient is a 73y old  Male who presents with a chief complaint of Hypoglycemia (21 Jun 2023 12:10)      SUBJECTIVE / OVERNIGHT EVENTS: Patient seen and examined at bedside. No acute overnight events.     MEDICATIONS  (STANDING):  apixaban 5 milliGRAM(s) Oral every 12 hours  aspirin  chewable 81 milliGRAM(s) Oral daily  chlorhexidine 2% Cloths 1 Application(s) Topical <User Schedule>  dextrose 5%. 1000 milliLiter(s) (50 mL/Hr) IV Continuous <Continuous>  dextrose 5%. 1000 milliLiter(s) (100 mL/Hr) IV Continuous <Continuous>  dextrose 50% Injectable 25 Gram(s) IV Push once  dextrose 50% Injectable 25 Gram(s) IV Push once  dextrose 50% Injectable 12.5 Gram(s) IV Push once  folic acid 1 milliGRAM(s) Oral daily  glucagon  Injectable 1 milliGRAM(s) IntraMuscular once  insulin glargine Injectable (LANTUS) 8 Unit(s) SubCutaneous every morning  insulin lispro (ADMELOG) corrective regimen sliding scale   SubCutaneous three times a day before meals  insulin lispro (ADMELOG) corrective regimen sliding scale   SubCutaneous <User Schedule>  insulin lispro Injectable (ADMELOG) 7 Unit(s) SubCutaneous before breakfast  metoprolol succinate  milliGRAM(s) Oral daily  nystatin    Suspension [id number] Unit(s) Oral three times a day  predniSONE   Tablet 30 milliGRAM(s) Oral daily  senna 2 Tablet(s) Oral at bedtime  tacrolimus 2 milliGRAM(s) Oral <User Schedule>  trimethoprim   80 mG/sulfamethoxazole 400 mG 1 Tablet(s) Oral <User Schedule>  valGANciclovir 450 milliGRAM(s) Oral <User Schedule>    MEDICATIONS  (PRN):  acetaminophen     Tablet .. 650 milliGRAM(s) Oral every 6 hours PRN Temp greater or equal to 38C (100.4F), Mild Pain (1 - 3)  aluminum hydroxide/magnesium hydroxide/simethicone Suspension 30 milliLiter(s) Oral every 4 hours PRN Dyspepsia  dextrose Oral Gel 15 Gram(s) Oral once PRN Blood Glucose LESS THAN 70 milliGRAM(s)/deciliter      CAPILLARY BLOOD GLUCOSE      POCT Blood Glucose.: 125 mg/dL (22 Jun 2023 02:16)  POCT Blood Glucose.: 113 mg/dL (21 Jun 2023 22:14)  POCT Blood Glucose.: 85 mg/dL (21 Jun 2023 16:41)  POCT Blood Glucose.: 201 mg/dL (21 Jun 2023 11:57)  POCT Blood Glucose.: 136 mg/dL (21 Jun 2023 08:17)    I&O's Summary    21 Jun 2023 07:01  -  22 Jun 2023 07:00  --------------------------------------------------------  IN: 720 mL / OUT: 1100 mL / NET: -380 mL        PHYSICAL EXAM:  Vital Signs Last 24 Hrs  T(C): 36.5 (22 Jun 2023 03:51), Max: 36.7 (21 Jun 2023 11:19)  T(F): 97.7 (22 Jun 2023 03:51), Max: 98.1 (21 Jun 2023 11:19)  HR: 102 (22 Jun 2023 05:05) (97 - 110)  BP: 125/81 (22 Jun 2023 05:05) (112/75 - 126/86)  BP(mean): --  RR: 18 (22 Jun 2023 03:51) (17 - 18)  SpO2: 99% (22 Jun 2023 03:51) (97% - 99%)    Parameters below as of 22 Jun 2023 03:51  Patient On (Oxygen Delivery Method): room air        GENERAL: NAD  HEENT: NCAT, PERRLA, EOMI, no scleral icterus, no LAD  LUNG: CTABL; No wheezes, crackles, or rhonchi  HEART: RRR; normal S1/S2; No murmurs, rubs, or gallops  ABDOMEN: +BS, soft, nontender, nondistended  EXTREMITIES:  No LE edema b/l, 2+ Peripheral Pulses, No clubbing or cyanosis  NEUROLOGY: AOx3, non-focal, sensation intact  SKIN: No rashes or lesions    LABS:                        9.1    8.23  )-----------( 520      ( 21 Jun 2023 10:42 )             31.3     06-21    143  |  103  |  36<H>  ----------------------------<  186<H>  4.9   |  22  |  1.74<H>    Ca    8.7      21 Jun 2023 10:43  Phos  2.4     06-21  Mg     2.2     06-21    TPro  6.5  /  Alb  4.0  /  TBili  0.4  /  DBili  x   /  AST  16  /  ALT  10  /  AlkPhos  51  06-21    PT/INR - ( 20 Jun 2023 10:06 )   PT: 17.9 sec;   INR: 1.55 ratio         PTT - ( 20 Jun 2023 10:06 )  PTT:33.6 sec      Urinalysis Basic - ( 21 Jun 2023 10:43 )    Color: x / Appearance: x / SG: x / pH: x  Gluc: 186 mg/dL / Ketone: x  / Bili: x / Urobili: x   Blood: x / Protein: x / Nitrite: x   Leuk Esterase: x / RBC: x / WBC x   Sq Epi: x / Non Sq Epi: x / Bacteria: x          RADIOLOGY & ADDITIONAL TESTS:  Results Reviewed:   Imaging Personally Reviewed:  Electrocardiogram Personally Reviewed:    COORDINATION OF CARE:  Care Discussed with Consultants/Other Providers [Y/N]:  Prior or Outpatient Records Reviewed [Y/N]:   ***************************************************************  Dale Riley MD (PGY-1)  Internal Medicine  Pager: [phone]  ***************************************************************    PROGRESS NOTE:     Patient is a 73y old  Male who presents with a chief complaint of Hypoglycemia (21 Jun 2023 12:10)      SUBJECTIVE / OVERNIGHT EVENTS: Patient seen and examined at bedside. No acute overnight events. Patient denies SOB, HA, dizziness.    MEDICATIONS  (STANDING):  apixaban 5 milliGRAM(s) Oral every 12 hours  aspirin  chewable 81 milliGRAM(s) Oral daily  chlorhexidine 2% Cloths 1 Application(s) Topical <User Schedule>  dextrose 5%. 1000 milliLiter(s) (50 mL/Hr) IV Continuous <Continuous>  dextrose 5%. 1000 milliLiter(s) (100 mL/Hr) IV Continuous <Continuous>  dextrose 50% Injectable 25 Gram(s) IV Push once  dextrose 50% Injectable 25 Gram(s) IV Push once  dextrose 50% Injectable 12.5 Gram(s) IV Push once  folic acid 1 milliGRAM(s) Oral daily  glucagon  Injectable 1 milliGRAM(s) IntraMuscular once  insulin glargine Injectable (LANTUS) 8 Unit(s) SubCutaneous every morning  insulin lispro (ADMELOG) corrective regimen sliding scale   SubCutaneous three times a day before meals  insulin lispro (ADMELOG) corrective regimen sliding scale   SubCutaneous <User Schedule>  insulin lispro Injectable (ADMELOG) 7 Unit(s) SubCutaneous before breakfast  metoprolol succinate  milliGRAM(s) Oral daily  nystatin    Suspension [id number] Unit(s) Oral three times a day  predniSONE   Tablet 30 milliGRAM(s) Oral daily  senna 2 Tablet(s) Oral at bedtime  tacrolimus 2 milliGRAM(s) Oral <User Schedule>  trimethoprim   80 mG/sulfamethoxazole 400 mG 1 Tablet(s) Oral <User Schedule>  valGANciclovir 450 milliGRAM(s) Oral <User Schedule>    MEDICATIONS  (PRN):  acetaminophen     Tablet .. 650 milliGRAM(s) Oral every 6 hours PRN Temp greater or equal to 38C (100.4F), Mild Pain (1 - 3)  aluminum hydroxide/magnesium hydroxide/simethicone Suspension 30 milliLiter(s) Oral every 4 hours PRN Dyspepsia  dextrose Oral Gel 15 Gram(s) Oral once PRN Blood Glucose LESS THAN 70 milliGRAM(s)/deciliter      CAPILLARY BLOOD GLUCOSE      POCT Blood Glucose.: 125 mg/dL (22 Jun 2023 02:16)  POCT Blood Glucose.: 113 mg/dL (21 Jun 2023 22:14)  POCT Blood Glucose.: 85 mg/dL (21 Jun 2023 16:41)  POCT Blood Glucose.: 201 mg/dL (21 Jun 2023 11:57)  POCT Blood Glucose.: 136 mg/dL (21 Jun 2023 08:17)    I&O's Summary    21 Jun 2023 07:01  -  22 Jun 2023 07:00  --------------------------------------------------------  IN: 720 mL / OUT: 1100 mL / NET: -380 mL        PHYSICAL EXAM:  Vital Signs Last 24 Hrs  T(C): 36.5 (22 Jun 2023 03:51), Max: 36.7 (21 Jun 2023 11:19)  T(F): 97.7 (22 Jun 2023 03:51), Max: 98.1 (21 Jun 2023 11:19)  HR: 102 (22 Jun 2023 05:05) (97 - 110)  BP: 125/81 (22 Jun 2023 05:05) (112/75 - 126/86)  BP(mean): --  RR: 18 (22 Jun 2023 03:51) (17 - 18)  SpO2: 99% (22 Jun 2023 03:51) (97% - 99%)    Parameters below as of 22 Jun 2023 03:51  Patient On (Oxygen Delivery Method): room air        GENERAL: NAD  HEENT: NCAT, PERRLA, EOMI, no scleral icterus, no LAD  LUNG: CTABL; No wheezes, crackles, or rhonchi  HEART: RRR; normal S1/S2; No murmurs, rubs, or gallops  ABDOMEN: +BS, soft, nontender, nondistended  EXTREMITIES:  No LE edema b/l, 2+ Peripheral Pulses, No clubbing or cyanosis  NEUROLOGY: AOx3, non-focal, sensation intact  SKIN: No rashes or lesions    LABS:                        9.1    8.23  )-----------( 520      ( 21 Jun 2023 10:42 )             31.3     06-21    143  |  103  |  36<H>  ----------------------------<  186<H>  4.9   |  22  |  1.74<H>    Ca    8.7      21 Jun 2023 10:43  Phos  2.4     06-21  Mg     2.2     06-21    TPro  6.5  /  Alb  4.0  /  TBili  0.4  /  DBili  x   /  AST  16  /  ALT  10  /  AlkPhos  51  06-21    PT/INR - ( 20 Jun 2023 10:06 )   PT: 17.9 sec;   INR: 1.55 ratio         PTT - ( 20 Jun 2023 10:06 )  PTT:33.6 sec      Urinalysis Basic - ( 21 Jun 2023 10:43 )    Color: x / Appearance: x / SG: x / pH: x  Gluc: 186 mg/dL / Ketone: x  / Bili: x / Urobili: x   Blood: x / Protein: x / Nitrite: x   Leuk Esterase: x / RBC: x / WBC x   Sq Epi: x / Non Sq Epi: x / Bacteria: x          RADIOLOGY & ADDITIONAL TESTS:  Results Reviewed:   Imaging Personally Reviewed:  Electrocardiogram Personally Reviewed:    COORDINATION OF CARE:  Care Discussed with Consultants/Other Providers [Y/N]:  Prior or Outpatient Records Reviewed [Y/N]:

## 2023-06-22 NOTE — PROGRESS NOTE ADULT - NUTRITIONAL ASSESSMENT
Diet, DASH/TLC:   Sodium & Cholesterol Restricted  Consistent Carbohydrate {Evening Snack} (CSTCHOSN)  No Concentrated Potassium (06-16-23 @ 14:17) [Active]
Diet, DASH/TLC:   Sodium & Cholesterol Restricted  Consistent Carbohydrate {Evening Snack} (CSTCHOSN) (06-14-23 @ 21:31) [Active]
Diet, DASH/TLC:   Sodium & Cholesterol Restricted  Consistent Carbohydrate {Evening Snack} (CSTCHOSN)  No Concentrated Potassium (06-16-23 @ 14:17) [Active]
Diet, DASH/TLC:   Sodium & Cholesterol Restricted  Consistent Carbohydrate {Evening Snack} (CSTCHOSN)  No Concentrated Potassium (06-16-23 @ 14:17) [Active]

## 2023-06-22 NOTE — DIETITIAN INITIAL EVALUATION ADULT - RD TO REMAIN AVAILABLE
Eliane Coles MS, RD, CDN, [facility] pgr #[phone] or MS Teams (preferred)/yes

## 2023-06-22 NOTE — DIETITIAN INITIAL EVALUATION ADULT - PROBLEM SELECTOR PLAN 1
Pt admitted for tremors and sweats likely 2/2 hypoglycemia iso poor PO intake  -Pt likely needs titration of insulin regimen given decreased diet at home. On home glargine 20 daily; admelog 40 pre-breakfast/14 pre-lunch/4 pre-dinner  -C/w lantus 10 nightly and ISS. Recalculate premeal based on 24 hour ISS   -Consult endocrine in AM  -DASH diet with CC

## 2023-06-22 NOTE — DIETITIAN INITIAL EVALUATION ADULT - REASON FOR ADMISSION
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

## 2023-06-22 NOTE — DIETITIAN INITIAL EVALUATION ADULT - PERTINENT MEDS FT
MEDICATIONS  (STANDING):  apixaban 5 milliGRAM(s) Oral every 12 hours  aspirin  chewable 81 milliGRAM(s) Oral daily  chlorhexidine 2% Cloths 1 Application(s) Topical <User Schedule>  dextrose 5%. 1000 milliLiter(s) (50 mL/Hr) IV Continuous <Continuous>  dextrose 5%. 1000 milliLiter(s) (100 mL/Hr) IV Continuous <Continuous>  dextrose 50% Injectable 25 Gram(s) IV Push once  dextrose 50% Injectable 25 Gram(s) IV Push once  dextrose 50% Injectable 12.5 Gram(s) IV Push once  folic acid 1 milliGRAM(s) Oral daily  glucagon  Injectable 1 milliGRAM(s) IntraMuscular once  insulin glargine Injectable (LANTUS) 8 Unit(s) SubCutaneous every morning  insulin lispro (ADMELOG) corrective regimen sliding scale   SubCutaneous three times a day before meals  insulin lispro (ADMELOG) corrective regimen sliding scale   SubCutaneous <User Schedule>  insulin lispro Injectable (ADMELOG) 7 Unit(s) SubCutaneous before breakfast  metoprolol succinate  milliGRAM(s) Oral daily  nystatin    Suspension 552828 Unit(s) Oral three times a day  predniSONE   Tablet 30 milliGRAM(s) Oral daily  senna 2 Tablet(s) Oral at bedtime  tacrolimus 2 milliGRAM(s) Oral <User Schedule>  trimethoprim   80 mG/sulfamethoxazole 400 mG 1 Tablet(s) Oral <User Schedule>  valGANciclovir 450 milliGRAM(s) Oral <User Schedule>    MEDICATIONS  (PRN):  acetaminophen     Tablet .. 650 milliGRAM(s) Oral every 6 hours PRN Temp greater or equal to 38C (100.4F), Mild Pain (1 - 3)  aluminum hydroxide/magnesium hydroxide/simethicone Suspension 30 milliLiter(s) Oral every 4 hours PRN Dyspepsia  dextrose Oral Gel 15 Gram(s) Oral once PRN Blood Glucose LESS THAN 70 milliGRAM(s)/deciliter

## 2023-06-22 NOTE — DIETITIAN INITIAL EVALUATION ADULT - PROBLEM SELECTOR PLAN 2
Admitted with Cr of 3/19, normal range 1.5-2.1. CKD IV.  -NORMAN likely 2/2 diuresis and poor PO intake, suspect pre-renal FeUrea. F/u urine studies   -Encourage PO intake as pt able to tolerate PO  -Continue with home bumex 1 daily   -Avoid nephrotoxic agents

## 2023-06-22 NOTE — DIETITIAN INITIAL EVALUATION ADULT - PHYSCIAL ASSESSMENT
Dosing wt: 160 pounds (6/16/23). Daily weights as follows (standing weights): 6/16: 174.8 pounds, 6/17: 176.5 pounds, 6/20: 176.8 pounds, 6/21: 174.6 pounds, 6/22: 177.6 pounds. Per RD note from 5/25/23, pt reports UBW of ~180 pounds but is unclear of true "dry weight." Standing wt during last admit in May 2023 of 173.7 pounds (5/25/23). Wt seems stable at this time. ? accuracy of dosing wt. Per RD note from 5/25, wt history as follows: "history from previous admission: 160.1lb (3/9/23), 158lb (11/7/20), 190lb (9/4/20), 165lb (8/17/20)."    IBW: 148 lbs

## 2023-06-22 NOTE — DIETITIAN INITIAL EVALUATION ADULT - OTHER INFO
-s/p heart transplant 2018; tacrolimus & prednisone ordered for immunosuppression   - BG being managed in house with 8 units lantus, humulin and admelog  - Previously with hyperkalemia; ordered for lokelma  - Folic acid ordered

## 2023-06-22 NOTE — PROGRESS NOTE ADULT - PROBLEM SELECTOR PLAN 3
Was on pravastatin 20mg   Continue statin therapy if no contraindication. Per primary team.         Contact via [employer] Teams during business hours  To reach covering provider access AMION via sunrise tools  For Urgent matters/after-hours/weekends/holidays please page endocrine fellow on call   For nonurgent matters please email [email or web address]    Please note that this patient may be followed by different provider tomorrow.  Notify endocrine 24 hours prior to discharge for final recommendations

## 2023-06-22 NOTE — PROGRESS NOTE ADULT - PROBLEM SELECTOR PLAN 1
-test BG AC/HS  -Continue Lantus 8 units QAM  -continue Admelog to 7 units w/breakfast , no standing insulin for lunch or dinner   -Continue Admelog low correction scale AC and low HS scale  -cons carb diet  -On Prednisone 30mg>notify endocrine if this dose adjusted.  -Keep hypoglycemia protocol in place   Discharge planning :   - Discharge plan to rehab- can continue current dose basal  & bolus with correction scale ( Lantus 8 units in am,  Admelog 7 units before breakfast and Admelog 2 units before lunch, to hold premeal insulin if not eating).   - Test BGs ACTID at HS and Insulin dose to be adjusted at reaab  - Insulin management safety to be reassessed at the time of discharge from rehab. Suggested to God brother for patient to take oral medications when the home aid is present or when he is present to ensure appropriate administration. Needs assistance at home  - Ophthalmology and podiatry follow up outpatient   - Has follow up outpatient appointment with Endocrine Dr. Clemens Aug 15th at 10:45 at [address] [place].

## 2023-06-22 NOTE — CHART NOTE - NSCHARTNOTEFT_GEN_A_CORE
Labs and chart reviewed, was discussed with Dr. Mccabe  Plan to increase Tacro 3 mg PO BID  Will be given dose of Lokelma 10 mg PO x 1 due to K of 6.2  Please check CMP daily  Please place compression stockings on patient
Per. Dr Mccabe, increase Tacro 2 mg PO BID  Will consider resuming Bumex tomorrow at reduce dose
Told by RN that pt lost his IV access and refused to have an IV. Went to see patient.    Pt kept repeating that "he is a hard stick" and "does not want an IV". He understands that under urgent situations he could die if he did not have an IV. He refused to provide teach back and kept repeating that "he is a hard stick".    He has no IV meds at this point and he is medically clear to be dc'd to rehab waiting for a bed.    While it is not ideal that he has no IV, he does not allow anyone to try for IV placement nor does he have IV meds at this point. In urgent situations, can try IV placement or IO if life threatening.

## 2023-06-22 NOTE — PROGRESS NOTE ADULT - SUBJECTIVE AND OBJECTIVE BOX
seen earlier today     Chief Complaint: Diabetes Mellitus follow up    INTERVAL HX: tolerating po,reports good intake at breakfast , ac lunch at goal      Review of Systems:  General: As above  GI: No nausea, vomiting  Endocrine: no  S&Sx of hypoglycemia    Allergies    No Known Allergies    Intolerances      MEDICATIONS  (STANDING):  apixaban 5 milliGRAM(s) Oral every 12 hours  aspirin  chewable 81 milliGRAM(s) Oral daily  chlorhexidine 2% Cloths 1 Application(s) Topical <User Schedule>  dextrose 5%. 1000 milliLiter(s) (100 mL/Hr) IV Continuous <Continuous>  dextrose 5%. 1000 milliLiter(s) (50 mL/Hr) IV Continuous <Continuous>  dextrose 50% Injectable 25 Gram(s) IV Push once  dextrose 50% Injectable 25 Gram(s) IV Push once  dextrose 50% Injectable 50 milliLiter(s) IV Push once  dextrose 50% Injectable 12.5 Gram(s) IV Push once  folic acid 1 milliGRAM(s) Oral daily  glucagon  Injectable 1 milliGRAM(s) IntraMuscular once  insulin glargine Injectable (LANTUS) 8 Unit(s) SubCutaneous every morning  insulin lispro (ADMELOG) corrective regimen sliding scale   SubCutaneous three times a day before meals  insulin lispro (ADMELOG) corrective regimen sliding scale   SubCutaneous <User Schedule>  insulin lispro Injectable (ADMELOG) 7 Unit(s) SubCutaneous before breakfast  insulin regular  human recombinant 10 Unit(s) IV Push once  metoprolol succinate  milliGRAM(s) Oral daily  nystatin    Suspension [id number] Unit(s) Oral three times a day  predniSONE   Tablet 30 milliGRAM(s) Oral daily  senna 2 Tablet(s) Oral at bedtime  trimethoprim   80 mG/sulfamethoxazole 400 mG 1 Tablet(s) Oral <User Schedule>  valGANciclovir 450 milliGRAM(s) Oral <User Schedule>        insulin glargine Injectable (LANTUS)   8 Unit(s) SubCutaneous (06-22-23 @ 09:32)    insulin lispro (ADMELOG) corrective regimen sliding scale   1  SubCutaneous (06-22-23 @ 12:25)    insulin lispro Injectable (ADMELOG)   7 Unit(s) SubCutaneous (06-22-23 @ 08:52)    predniSONE   Tablet   30 milliGRAM(s) Oral (06-22-23 @ 05:10)        PHYSICAL EXAM:  VITALS: T(C): 36.5 (06-22-23 @ 11:37)  T(F): 97.7 (06-22-23 @ 11:37), Max: 97.9 (06-21-23 @ 21:04)  HR: 81 (06-22-23 @ 11:37) (81 - 110)  BP: 121/83 (06-22-23 @ 11:37) (118/81 - 126/86)  RR:  (16 - 18)  SpO2:  (99% - 99%)  Wt(kg): --  GENERAL: male sitting in chair  in NAD  Respiratory: Respirations unlabored   Extremities: Warm, no edema  NEURO: Alert , appropriate     LABS:  POCT Blood Glucose.: 171 mg/dL (06-22-23 @ 12:07)  POCT Blood Glucose.: 137 mg/dL (06-22-23 @ 08:33)  POCT Blood Glucose.: 125 mg/dL (06-22-23 @ 02:16)  POCT Blood Glucose.: 113 mg/dL (06-21-23 @ 22:14)  POCT Blood Glucose.: 85 mg/dL (06-21-23 @ 16:41)  POCT Blood Glucose.: 201 mg/dL (06-21-23 @ 11:57)  POCT Blood Glucose.: 136 mg/dL (06-21-23 @ 08:17)  POCT Blood Glucose.: 119 mg/dL (06-21-23 @ 02:47)  POCT Blood Glucose.: 103 mg/dL (06-20-23 @ 21:44)  POCT Blood Glucose.: 95 mg/dL (06-20-23 @ 17:05)  POCT Blood Glucose.: 275 mg/dL (06-20-23 @ 11:52)  POCT Blood Glucose.: 215 mg/dL (06-20-23 @ 08:20)  POCT Blood Glucose.: 101 mg/dL (06-20-23 @ 02:14)  POCT Blood Glucose.: 120 mg/dL (06-19-23 @ 21:42)  POCT Blood Glucose.: 161 mg/dL (06-19-23 @ 16:54)                          9.1    9.31  )-----------( 547      ( 22 Jun 2023 11:57 )             31.0     06-22    139  |  105  |  36<H>  ----------------------------<  149<H>  6.2<HH>   |  26  |  1.76<H>    Ca    9.2      22 Jun 2023 11:57  Phos  2.4     06-21  Mg     2.2     06-21    TPro  6.5  /  Alb  4.0  /  TBili  0.4  /  DBili  x   /  AST  16  /  ALT  13  /  AlkPhos  52  06-22    eGFR: 40 mL/min/1.73m2 (22 Jun 2023 11:57)    06-15 Chol 112 Direct LDL -- LDL calculated 41 HDL 45 Trig 128  Thyroid Function Tests:      A1C with Estimated Average Glucose Result: 8.6 % (05-24-23 @ 06:00)    Estimated Average Glucose: 200 mg/dL (05-24-23 @ 06:00)        Diet, DASH/TLC:   Sodium & Cholesterol Restricted  Consistent Carbohydrate Evening Snack (CSTCHOSN)  No Concentrated Potassium (06-16-23 @ 14:17) [Active]

## 2023-06-22 NOTE — PROGRESS NOTE ADULT - ATTENDING COMMENTS
above plans discussed with Dr. Riley    # hypoglycemia  # hyperkalemia  # NORMAN on CKD 4  # s/p heart transplant  # pleural effusion    - K+ 6.3 this morning, s/p insulin/D50 and lokelma, recheck BMP in the evening  - hypoglycemia and NORMAN now resolved  - continue tacro 3mg BID  - continue eliquis for chronic Afib/aflutter    dc planning to [facility] in AM if remains stable    Brittany Trevizo MD  Division of Hospital Medicine  Contact via Microsoft Teams  Office: [phone]

## 2023-06-22 NOTE — DIETITIAN INITIAL EVALUATION ADULT - PROBLEM SELECTOR PLAN 9
Pt and emergency contact could not provide full med list on admission. Pt recently discharged from [facility] and reports no changes in medications since discharge. Please confirm med rec with pharmacy in AM

## 2023-06-22 NOTE — DIETITIAN INITIAL EVALUATION ADULT - ORAL INTAKE PTA/DIET HISTORY
Unable to obtain diet history PTA as pt not wanting to participate in full RD interview. No known food allergies per chart. Noted pt admitted and seen by RD 5/2023 with previously reported intact appetite at home. Of note, per H&P, pt with decreased PO intake at home.

## 2023-06-22 NOTE — PROGRESS NOTE ADULT - PROBLEM SELECTOR PLAN 5
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 Tacrolimus level daily before dose.   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 Tacrolimus level daily before dose.   - He is off cellcept chronically while on prednisone for hemolytic anemia  - Continue Bactrim, valganciclovir, nystatin  - C/w aspirin  - TTE 6/16 - EF 56%  - Transplant following

## 2023-06-22 NOTE — DIETITIAN INITIAL EVALUATION ADULT - ADD RECOMMEND
- Monitor diet, PO intake, GI status, weight, labs, skin integrity  - Honor pt food preferences to promote adequate oral intake while in-house  - RD remains available to provide ongoing nutrition education PRN

## 2023-06-22 NOTE — PROGRESS NOTE ADULT - PROBLEM SELECTOR PLAN 1
Pt admitted for tremors and sweats likely 2/2 hypoglycemia iso poor PO intake  -Pt likely needs titration of insulin regimen given decreased diet at home.   - Reportedly on home glargine 20 daily; admelog 40 pre-breakfast/14 pre-lunch/4 pre-dinner --> per pharm team and records, on admelog 3 pre breakfast  - C/w lantus 8, ISS, lispro 7 pre-breakfast, 2u pre-lunch  - DASH diet with CC  - endocrine recs

## 2023-06-22 NOTE — PROGRESS NOTE ADULT - PROBLEM SELECTOR PLAN 2
Admitted with Cr of 3 .19, normal range 1.5-2.1. CKD IV. FeUrea 30.1% --> indicative of pre-renal disease however Tacrolimus levels supratherapeutic (but drawn during wrong time) and may be contributor to NORMAN.   - Renal U/S w/ medullary pyramid in addition to b/l increased echogenicity   - hold home bumex 1 daily pending cards/nephro, encouraging PO intake   - Tacro dosed to be drawn 30 mins prior to dose --> emphasizing Tacro not be given until dose drawn for accurate measurement. May likely need to readjust tacro dosing to different time to accommodate patient need for phlebotomy to draw labs   - Avoid nephrotoxic agents  - cardiorenal and transplant cards following Admitted with Cr of 3 .19, normal range 1.5-2.1. CKD IV. FeUrea 30.1% --> indicative of pre-renal disease however Tacrolimus levels supratherapeutic (but drawn during wrong time) and may be contributor to NORMAN.   - Renal U/S w/ medullary pyramid in addition to b/l increased echogenicity   - hold home bumex 1 daily pending cards/nephro, encouraging PO intake   - Tacro dosed to be drawn 30 mins prior to dose --> emphasizing Tacro not be given until dose drawn for accurate measurement. May likely need to readjust tacro dosing to different time to accommodate patient need for phlebotomy to draw labs   - Avoid nephrotoxic agents  - cardiorenal and transplant cards following  - Cr improving

## 2023-06-23 ENCOUNTER — TRANSCRIPTION ENCOUNTER (OUTPATIENT)
Age: 73
End: 2023-06-23

## 2023-06-23 VITALS
WEIGHT: 177.25 LBS | RESPIRATION RATE: 18 BRPM | DIASTOLIC BLOOD PRESSURE: 85 MMHG | TEMPERATURE: 98 F | HEART RATE: 94 BPM | SYSTOLIC BLOOD PRESSURE: 127 MMHG | OXYGEN SATURATION: 98 %

## 2023-06-23 LAB
ANION GAP SERPL CALC-SCNC: 13 MMOL/L — SIGNIFICANT CHANGE UP (ref 5–17)
BUN SERPL-MCNC: 38 MG/DL — HIGH (ref 7–23)
CALCIUM SERPL-MCNC: 8.9 MG/DL — SIGNIFICANT CHANGE UP (ref 8.4–10.5)
CHLORIDE SERPL-SCNC: 106 MMOL/L — SIGNIFICANT CHANGE UP (ref 96–108)
CO2 SERPL-SCNC: 23 MMOL/L — SIGNIFICANT CHANGE UP (ref 22–31)
CREAT SERPL-MCNC: 1.75 MG/DL — HIGH (ref 0.5–1.3)
EGFR: 41 ML/MIN/1.73M2 — LOW
GLUCOSE BLDC GLUCOMTR-MCNC: 193 MG/DL — HIGH (ref 70–99)
GLUCOSE BLDC GLUCOMTR-MCNC: 261 MG/DL — HIGH (ref 70–99)
GLUCOSE BLDC GLUCOMTR-MCNC: 96 MG/DL — SIGNIFICANT CHANGE UP (ref 70–99)
GLUCOSE SERPL-MCNC: 89 MG/DL — SIGNIFICANT CHANGE UP (ref 70–99)
MAGNESIUM SERPL-MCNC: 2.2 MG/DL — SIGNIFICANT CHANGE UP (ref 1.6–2.6)
PHOSPHATE SERPL-MCNC: 3.5 MG/DL — SIGNIFICANT CHANGE UP (ref 2.5–4.5)
POTASSIUM SERPL-MCNC: 4.6 MMOL/L — SIGNIFICANT CHANGE UP (ref 3.5–5.3)
POTASSIUM SERPL-SCNC: 4.6 MMOL/L — SIGNIFICANT CHANGE UP (ref 3.5–5.3)
SARS-COV-2 RNA SPEC QL NAA+PROBE: SIGNIFICANT CHANGE UP
SODIUM SERPL-SCNC: 142 MMOL/L — SIGNIFICANT CHANGE UP (ref 135–145)
TACROLIMUS SERPL-MCNC: 3.5 NG/ML — SIGNIFICANT CHANGE UP

## 2023-06-23 PROCEDURE — 80053 COMPREHEN METABOLIC PANEL: CPT

## 2023-06-23 PROCEDURE — 82330 ASSAY OF CALCIUM: CPT

## 2023-06-23 PROCEDURE — 86900 BLOOD TYPING SEROLOGIC ABO: CPT

## 2023-06-23 PROCEDURE — 80197 ASSAY OF TACROLIMUS: CPT

## 2023-06-23 PROCEDURE — 84484 ASSAY OF TROPONIN QUANT: CPT

## 2023-06-23 PROCEDURE — 0225U NFCT DS DNA&RNA 21 SARSCOV2: CPT

## 2023-06-23 PROCEDURE — 82570 ASSAY OF URINE CREATININE: CPT

## 2023-06-23 PROCEDURE — 97112 NEUROMUSCULAR REEDUCATION: CPT

## 2023-06-23 PROCEDURE — 86922 COMPATIBILITY TEST ANTIGLOB: CPT

## 2023-06-23 PROCEDURE — 80061 LIPID PANEL: CPT

## 2023-06-23 PROCEDURE — 97161 PT EVAL LOW COMPLEX 20 MIN: CPT

## 2023-06-23 PROCEDURE — 85730 THROMBOPLASTIN TIME PARTIAL: CPT

## 2023-06-23 PROCEDURE — 83880 ASSAY OF NATRIURETIC PEPTIDE: CPT

## 2023-06-23 PROCEDURE — 85027 COMPLETE CBC AUTOMATED: CPT

## 2023-06-23 PROCEDURE — 82435 ASSAY OF BLOOD CHLORIDE: CPT

## 2023-06-23 PROCEDURE — 83605 ASSAY OF LACTIC ACID: CPT

## 2023-06-23 PROCEDURE — 85014 HEMATOCRIT: CPT

## 2023-06-23 PROCEDURE — 87635 SARS-COV-2 COVID-19 AMP PRB: CPT

## 2023-06-23 PROCEDURE — 76376 3D RENDER W/INTRP POSTPROCES: CPT

## 2023-06-23 PROCEDURE — 71045 X-RAY EXAM CHEST 1 VIEW: CPT

## 2023-06-23 PROCEDURE — 80048 BASIC METABOLIC PNL TOTAL CA: CPT

## 2023-06-23 PROCEDURE — 83935 ASSAY OF URINE OSMOLALITY: CPT

## 2023-06-23 PROCEDURE — 99232 SBSQ HOSP IP/OBS MODERATE 35: CPT | Mod: GC

## 2023-06-23 PROCEDURE — 83735 ASSAY OF MAGNESIUM: CPT

## 2023-06-23 PROCEDURE — 87040 BLOOD CULTURE FOR BACTERIA: CPT

## 2023-06-23 PROCEDURE — 36415 COLL VENOUS BLD VENIPUNCTURE: CPT

## 2023-06-23 PROCEDURE — 84540 ASSAY OF URINE/UREA-N: CPT

## 2023-06-23 PROCEDURE — 84156 ASSAY OF PROTEIN URINE: CPT

## 2023-06-23 PROCEDURE — 83690 ASSAY OF LIPASE: CPT

## 2023-06-23 PROCEDURE — 81003 URINALYSIS AUTO W/O SCOPE: CPT

## 2023-06-23 PROCEDURE — 99285 EMERGENCY DEPT VISIT HI MDM: CPT

## 2023-06-23 PROCEDURE — 82947 ASSAY GLUCOSE BLOOD QUANT: CPT

## 2023-06-23 PROCEDURE — 82565 ASSAY OF CREATININE: CPT

## 2023-06-23 PROCEDURE — 82553 CREATINE MB FRACTION: CPT

## 2023-06-23 PROCEDURE — 85610 PROTHROMBIN TIME: CPT

## 2023-06-23 PROCEDURE — 93306 TTE W/DOPPLER COMPLETE: CPT

## 2023-06-23 PROCEDURE — 93970 EXTREMITY STUDY: CPT

## 2023-06-23 PROCEDURE — 84100 ASSAY OF PHOSPHORUS: CPT

## 2023-06-23 PROCEDURE — 82962 GLUCOSE BLOOD TEST: CPT

## 2023-06-23 PROCEDURE — 93356 MYOCRD STRAIN IMG SPCKL TRCK: CPT

## 2023-06-23 PROCEDURE — 82803 BLOOD GASES ANY COMBINATION: CPT

## 2023-06-23 PROCEDURE — 97116 GAIT TRAINING THERAPY: CPT

## 2023-06-23 PROCEDURE — 76937 US GUIDE VASCULAR ACCESS: CPT

## 2023-06-23 PROCEDURE — 85018 HEMOGLOBIN: CPT

## 2023-06-23 PROCEDURE — 86850 RBC ANTIBODY SCREEN: CPT

## 2023-06-23 PROCEDURE — 84133 ASSAY OF URINE POTASSIUM: CPT

## 2023-06-23 PROCEDURE — 84132 ASSAY OF SERUM POTASSIUM: CPT

## 2023-06-23 PROCEDURE — 84300 ASSAY OF URINE SODIUM: CPT

## 2023-06-23 PROCEDURE — 84295 ASSAY OF SERUM SODIUM: CPT

## 2023-06-23 PROCEDURE — 93005 ELECTROCARDIOGRAM TRACING: CPT

## 2023-06-23 PROCEDURE — 97530 THERAPEUTIC ACTIVITIES: CPT

## 2023-06-23 PROCEDURE — 86901 BLOOD TYPING SEROLOGIC RH(D): CPT

## 2023-06-23 PROCEDURE — 76770 US EXAM ABDO BACK WALL COMP: CPT

## 2023-06-23 PROCEDURE — 85025 COMPLETE CBC W/AUTO DIFF WBC: CPT

## 2023-06-23 RX ORDER — TACROLIMUS 5 MG/1
3 CAPSULE ORAL
Qty: 0 | Refills: 0 | DISCHARGE
Start: 2023-06-23

## 2023-06-23 RX ORDER — INSULIN GLARGINE 100 [IU]/ML
8 INJECTION, SOLUTION SUBCUTANEOUS
Qty: 0 | Refills: 0 | DISCHARGE
Start: 2023-06-23

## 2023-06-23 RX ORDER — INSULIN LISPRO 100/ML
3 VIAL (ML) SUBCUTANEOUS
Refills: 0 | DISCHARGE

## 2023-06-23 RX ORDER — SODIUM ZIRCONIUM CYCLOSILICATE 10 G/10G
5 POWDER, FOR SUSPENSION ORAL
Qty: 0 | Refills: 0 | DISCHARGE
Start: 2023-06-23

## 2023-06-23 RX ADMIN — Medication 1: at 08:22

## 2023-06-23 RX ADMIN — Medication 100 MILLIGRAM(S): at 05:10

## 2023-06-23 RX ADMIN — SODIUM ZIRCONIUM CYCLOSILICATE 10 GRAM(S): 10 POWDER, FOR SUSPENSION ORAL at 10:31

## 2023-06-23 RX ADMIN — Medication 3: at 12:25

## 2023-06-23 RX ADMIN — Medication 30 MILLIGRAM(S): at 05:10

## 2023-06-23 RX ADMIN — Medication 500000 UNIT(S): at 05:11

## 2023-06-23 RX ADMIN — Medication 1 TABLET(S): at 08:23

## 2023-06-23 RX ADMIN — Medication 81 MILLIGRAM(S): at 10:51

## 2023-06-23 RX ADMIN — TACROLIMUS 3 MILLIGRAM(S): 5 CAPSULE ORAL at 08:40

## 2023-06-23 RX ADMIN — Medication 1 MILLIGRAM(S): at 10:51

## 2023-06-23 RX ADMIN — CHLORHEXIDINE GLUCONATE 1 APPLICATION(S): 213 SOLUTION TOPICAL at 05:15

## 2023-06-23 RX ADMIN — INSULIN GLARGINE 8 UNIT(S): 100 INJECTION, SOLUTION SUBCUTANEOUS at 08:40

## 2023-06-23 RX ADMIN — Medication 7 UNIT(S): at 08:40

## 2023-06-23 RX ADMIN — APIXABAN 5 MILLIGRAM(S): 2.5 TABLET, FILM COATED ORAL at 05:11

## 2023-06-23 NOTE — PROGRESS NOTE ADULT - PROBLEM SELECTOR PROBLEM 7
Hemolytic anemia
Hypertension
Hemolytic anemia
Hypertension
Hypertension
Hemolytic anemia
Hypertension

## 2023-06-23 NOTE — PROGRESS NOTE ADULT - PROBLEM SELECTOR PROBLEM 4
Immunosuppression
Nicole syndrome
Immunosuppression
Nicole syndrome
Immunosuppression
Nicole syndrome

## 2023-06-23 NOTE — PROGRESS NOTE ADULT - ATTENDING COMMENTS
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     #Hypoglycemia- resolved. Endocrine f/u appreciated. Will  c/w lantus 8U and decrease Admelog to 7u before breakfast. No standing insulin before lunch or dinner. c/w ISS AC and for bedtime.  #Heart Transplant Recipient - continue to dose tacro for goal 6-8, tacro level still below goal (3.5) so tacro was increased from to tacro 3mg in the am and 3 mg in the pm;  c/w ppx  bactrim (PCP ppm) and Valganciclovir (CMV ppx)  #NORMAN on CKD stage 4- Cr is resolved. Creatinine Trend: 1.75<--, 2.04<--, 1.76<--, 1.74<--, 1.94<--, 2.47<--    stable for Rehab when bed available  Discharge time spent: 34 min

## 2023-06-23 NOTE — PROGRESS NOTE ADULT - ATTENDING SUPERVISION STATEMENT
Fellow
Resident

## 2023-06-23 NOTE — PROGRESS NOTE ADULT - PROVIDER SPECIALTY LIST ADULT
Nephrology
Nephrology-Cardiorenal
Nephrology
Nephrology
Transplant Cardiology
Endocrinology
Nephrology
Endocrinology
Internal Medicine
Transplant Cardiology
Endocrinology
Internal Medicine
Transplant Cardiology
Internal Medicine

## 2023-06-23 NOTE — DISCHARGE NOTE NURSING/CASE MANAGEMENT/SOCIAL WORK - PATIENT PORTAL LINK FT
You can access the FollowMyHealth Patient Portal offered by [facility] by registering at the following website: [email or web address]. By joining [employer]’s FollowMyHealth portal, you will also be able to view your health information using other applications (apps) compatible with our system.

## 2023-06-23 NOTE — PROGRESS NOTE ADULT - PROBLEM SELECTOR PROBLEM 3
Hyperlipidemia
H/O heart transplant
Hyperlipidemia
H/O heart transplant
Pleural effusion
Hyperlipidemia
H/O heart transplant
Pleural effusion
Pleural effusion
Hyperlipidemia
Pleural effusion

## 2023-06-23 NOTE — DISCHARGE NOTE NURSING/CASE MANAGEMENT/SOCIAL WORK - NSDCVIVACCINE_GEN_ALL_CORE_FT
Hep A, adult; 07-Feb-2018 09:02; Kathleen Reyes (RN); [employer]; 7439F; IntraMuscular; Deltoid Right.; 1 milliLiter(s); VIS (VIS Published: 20-Jul-2017, VIS Presented: 07-Feb-2018);   Hep B, adult; 07-Feb-2018 09:09; Kathleen Reyes (RN); [employer]; B39CM; IntraMuscular; Deltoid Left.; 1 milliLiter(s); VIS (VIS Published: 20-Jul-2016, VIS Presented: 07-Feb-2018);   Hep B, adult; 16-Feb-2018 19:00; Brooklynn Coles (DIXON); [employer]; B39CM; IntraMuscular; Deltoid Right.; 1 milliLiter(s); VIS (VIS Published: 20-Jul-2016, VIS Presented: 16-Feb-2018);   influenza, injectable, quadrivalent, preservative free; 02-Oct-2020 12:07; Enriqueta Hassan ([place]); [employer]; [id number] (Exp. Date: 30-Jun-2021); IntraMuscular; Deltoid Right.; 0.5 milliLiter(s); VIS (VIS Published: 15-Aug-2019, VIS Presented: 02-Oct-2020);

## 2023-06-23 NOTE — PROGRESS NOTE ADULT - PROBLEM SELECTOR PLAN 2
Admitted with Cr of 3 .19, normal range 1.5-2.1. CKD IV. FeUrea 30.1% --> indicative of pre-renal disease however Tacrolimus levels supratherapeutic (but drawn during wrong time) and may be contributor to NORMAN.   - Renal U/S w/ medullary pyramid in addition to b/l increased echogenicity   - hold home bumex 1 daily pending cards/nephro, encouraging PO intake   - Tacro dosed to be drawn 30 mins prior to dose --> emphasizing Tacro not be given until dose drawn for accurate measurement. May likely need to readjust tacro dosing to different time to accommodate patient need for phlebotomy to draw labs   - Avoid nephrotoxic agents  - cardiorenal and transplant cards following  - Cr improving

## 2023-06-23 NOTE — PROGRESS NOTE ADULT - PROBLEM SELECTOR PROBLEM 5
Heart replaced by transplant
Prophylactic antibiotic
Heart replaced by transplant
Prophylactic antibiotic
Heart replaced by transplant
Prophylactic antibiotic
Heart replaced by transplant
Heart replaced by transplant

## 2023-06-23 NOTE — PROGRESS NOTE ADULT - PROBLEM SELECTOR PLAN 1
Pt admitted for tremors and sweats likely 2/2 hypoglycemia iso poor PO intake  -Pt likely needs titration of insulin regimen given decreased diet at home.   - Reportedly on home glargine 20 daily; admelog 40 pre-breakfast/14 pre-lunch/4 pre-dinner --> per pharm team and records, on admelog 3 pre breakfast  - C/w lantus 8, ISS, lispro 7 pre-breakfast  - DASH diet with CC  - endocrine recs

## 2023-06-23 NOTE — PROGRESS NOTE ADULT - PROBLEM SELECTOR PLAN 5
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 Tacrolimus level daily before dose.   - He is off cellcept chronically while on prednisone for hemolytic anemia  - Continue Bactrim, valganciclovir, nystatin  - C/w aspirin  - TTE 6/16 - EF 56%  - Transplant following

## 2023-06-23 NOTE — PROGRESS NOTE ADULT - PROBLEM SELECTOR PROBLEM 6
Chronic atrial fibrillation
NORMAN (acute kidney injury)
Chronic atrial fibrillation
NORMAN (acute kidney injury)
NORMAN (acute kidney injury)
Chronic atrial fibrillation

## 2023-06-23 NOTE — PROGRESS NOTE ADULT - REASON FOR ADMISSION
Hypoglycemia

## 2023-06-23 NOTE — PROGRESS NOTE ADULT - SUBJECTIVE AND OBJECTIVE BOX
***************************************************************  Dale Riley MD (PGY-1)  Internal Medicine  Pager: [phone]  ***************************************************************    PROGRESS NOTE:     Patient is a 73y old  Male who presents with a chief complaint of Hypoglycemia (22 Jun 2023 13:25)      SUBJECTIVE / OVERNIGHT EVENTS: Patient seen and examined at bedside. No acute overnight events.     MEDICATIONS  (STANDING):  apixaban 5 milliGRAM(s) Oral every 12 hours  aspirin  chewable 81 milliGRAM(s) Oral daily  chlorhexidine 2% Cloths 1 Application(s) Topical <User Schedule>  dextrose 5%. 1000 milliLiter(s) (50 mL/Hr) IV Continuous <Continuous>  dextrose 5%. 1000 milliLiter(s) (100 mL/Hr) IV Continuous <Continuous>  dextrose 50% Injectable 25 Gram(s) IV Push once  dextrose 50% Injectable 25 Gram(s) IV Push once  dextrose 50% Injectable 12.5 Gram(s) IV Push once  folic acid 1 milliGRAM(s) Oral daily  glucagon  Injectable 1 milliGRAM(s) IntraMuscular once  insulin glargine Injectable (LANTUS) 8 Unit(s) SubCutaneous every morning  insulin lispro (ADMELOG) corrective regimen sliding scale   SubCutaneous three times a day before meals  insulin lispro (ADMELOG) corrective regimen sliding scale   SubCutaneous <User Schedule>  insulin lispro Injectable (ADMELOG) 7 Unit(s) SubCutaneous before breakfast  metoprolol succinate  milliGRAM(s) Oral daily  nystatin    Suspension [id number] Unit(s) Oral three times a day  predniSONE   Tablet 30 milliGRAM(s) Oral daily  senna 2 Tablet(s) Oral at bedtime  sodium zirconium cyclosilicate 10 Gram(s) Oral once  sodium zirconium cyclosilicate 10 Gram(s) Oral <User Schedule>  tacrolimus 3 milliGRAM(s) Oral <User Schedule>  trimethoprim   80 mG/sulfamethoxazole 400 mG 1 Tablet(s) Oral <User Schedule>  valGANciclovir 450 milliGRAM(s) Oral <User Schedule>    MEDICATIONS  (PRN):  acetaminophen     Tablet .. 650 milliGRAM(s) Oral every 6 hours PRN Temp greater or equal to 38C (100.4F), Mild Pain (1 - 3)  aluminum hydroxide/magnesium hydroxide/simethicone Suspension 30 milliLiter(s) Oral every 4 hours PRN Dyspepsia  dextrose Oral Gel 15 Gram(s) Oral once PRN Blood Glucose LESS THAN 70 milliGRAM(s)/deciliter      CAPILLARY BLOOD GLUCOSE      POCT Blood Glucose.: 96 mg/dL (23 Jun 2023 02:10)  POCT Blood Glucose.: 118 mg/dL (22 Jun 2023 21:41)  POCT Blood Glucose.: 102 mg/dL (22 Jun 2023 16:53)  POCT Blood Glucose.: 174 mg/dL (22 Jun 2023 15:53)  POCT Blood Glucose.: 201 mg/dL (22 Jun 2023 15:03)  POCT Blood Glucose.: 171 mg/dL (22 Jun 2023 12:07)  POCT Blood Glucose.: 137 mg/dL (22 Jun 2023 08:33)    I&O's Summary    22 Jun 2023 07:01  -  23 Jun 2023 07:00  --------------------------------------------------------  IN: 480 mL / OUT: 1500 mL / NET: -1020 mL        PHYSICAL EXAM:  Vital Signs Last 24 Hrs  T(C): 36.6 (23 Jun 2023 04:14), Max: 36.6 (22 Jun 2023 21:11)  T(F): 97.8 (23 Jun 2023 04:14), Max: 97.8 (22 Jun 2023 21:11)  HR: 94 (23 Jun 2023 04:14) (81 - 110)  BP: 127/85 (23 Jun 2023 04:14) (121/83 - 141/86)  BP(mean): --  RR: 18 (23 Jun 2023 04:14) (16 - 18)  SpO2: 98% (23 Jun 2023 04:14) (98% - 99%)    Parameters below as of 23 Jun 2023 04:14  Patient On (Oxygen Delivery Method): room air      GENERAL: NAD  HEENT: NCAT, PERRLA, EOMI, no scleral icterus, no LAD  LUNG: CTABL; No wheezes, crackles, or rhonchi  HEART: RRR; normal S1/S2; No murmurs, rubs, or gallops  ABDOMEN: +BS, soft, nontender, nondistended  EXTREMITIES:  No LE edema b/l, 2+ Peripheral Pulses, No clubbing or cyanosis  NEUROLOGY: AOx3, non-focal, sensation intact  SKIN: No rashes or lesions    LABS:                        9.1    9.31  )-----------( 547      ( 22 Jun 2023 11:57 )             31.0     06-23    142  |  106  |  38<H>  ----------------------------<  89  4.6   |  23  |  1.75<H>    Ca    8.9      23 Jun 2023 05:45  Phos  3.5     06-23  Mg     2.2     06-23    TPro  6.5  /  Alb  4.0  /  TBili  0.4  /  DBili  x   /  AST  16  /  ALT  13  /  AlkPhos  52  06-22          Urinalysis Basic - ( 23 Jun 2023 05:45 )    Color: x / Appearance: x / SG: x / pH: x  Gluc: 89 mg/dL / Ketone: x  / Bili: x / Urobili: x   Blood: x / Protein: x / Nitrite: x   Leuk Esterase: x / RBC: x / WBC x   Sq Epi: x / Non Sq Epi: x / Bacteria: x          RADIOLOGY & ADDITIONAL TESTS:  Results Reviewed:   Imaging Personally Reviewed:  Electrocardiogram Personally Reviewed:    COORDINATION OF CARE:  Care Discussed with Consultants/Other Providers [Y/N]:  Prior or Outpatient Records Reviewed [Y/N]:

## 2023-06-23 NOTE — PROGRESS NOTE ADULT - PROBLEM SELECTOR PROBLEM 2
Acute kidney injury superimposed on CKD
Essential hypertension
Hypotension
Acute kidney injury superimposed on CKD
Acute kidney injury superimposed on CKD
Essential hypertension
Essential hypertension
Acute kidney injury superimposed on CKD
Essential hypertension
Acute kidney injury superimposed on CKD
Essential hypertension
Hypotension
Acute kidney injury superimposed on CKD
Essential hypertension
Acute kidney injury superimposed on CKD
Acute kidney injury superimposed on CKD
Hypotension
Acute kidney injury superimposed on CKD

## 2023-06-23 NOTE — PROGRESS NOTE ADULT - PROBLEM SELECTOR PLAN 8
On home pravastatin 20

## 2023-06-23 NOTE — PROGRESS NOTE ADULT - PROBLEM SELECTOR PROBLEM 1
Acute kidney injury superimposed on CKD
Type 2 diabetes mellitus with hypoglycemia
Acute kidney injury superimposed on CKD
Acute kidney injury superimposed on CKD
Type 2 diabetes mellitus with hypoglycemia
Acute kidney injury superimposed on CKD
Hypoglycemia
Type 2 diabetes mellitus with hypoglycemia
Type 2 diabetes mellitus with hypoglycemia
Acute kidney injury superimposed on CKD
Hypoglycemia
Type 2 diabetes mellitus with hypoglycemia
Type 2 diabetes mellitus with hypoglycemia
Hypoglycemia

## 2023-06-26 ENCOUNTER — APPOINTMENT (OUTPATIENT)
Dept: INFUSION THERAPY | Facility: HOSPITAL | Age: 73
End: 2023-06-26

## 2023-06-26 ENCOUNTER — APPOINTMENT (OUTPATIENT)
Dept: HEMATOLOGY ONCOLOGY | Facility: CLINIC | Age: 73
End: 2023-06-26

## 2023-06-26 ENCOUNTER — NON-APPOINTMENT (OUTPATIENT)
Age: 73
End: 2023-06-26

## 2023-06-30 ENCOUNTER — NON-APPOINTMENT (OUTPATIENT)
Age: 73
End: 2023-06-30

## 2023-07-06 NOTE — CONSULT NOTE ADULT - CONSULT REQUESTED BY NAME
Ayaz Barr
CTu
Dr. Mcbride
Instructions (Optional): A. Right temple 0.4 r/o dn
Introduction Text (Please End With A Colon): To be performed next visit
X Size Of Lesion In Cm (Optional): 0
Detail Level: Detailed
Procedure To Be Performed At Next Visit: Biopsy by shave method

## 2023-07-10 ENCOUNTER — APPOINTMENT (OUTPATIENT)
Dept: HEART FAILURE | Facility: CLINIC | Age: 73
End: 2023-07-10
Payer: MEDICARE

## 2023-07-10 ENCOUNTER — INPATIENT (INPATIENT)
Facility: HOSPITAL | Age: 73
LOS: 7 days | Discharge: HOME HEALTH | DRG: 809 | End: 2023-07-18
Attending: INTERNAL MEDICINE | Admitting: STUDENT IN AN ORGANIZED HEALTH CARE EDUCATION/TRAINING PROGRAM
Payer: MEDICARE

## 2023-07-10 VITALS
SYSTOLIC BLOOD PRESSURE: 113 MMHG | TEMPERATURE: 99 F | HEART RATE: 106 BPM | RESPIRATION RATE: 18 BRPM | OXYGEN SATURATION: 98 % | WEIGHT: 179.9 LBS | HEIGHT: 67 IN | DIASTOLIC BLOOD PRESSURE: 79 MMHG

## 2023-07-10 VITALS
WEIGHT: 180 LBS | BODY MASS INDEX: 28.93 KG/M2 | HEART RATE: 116 BPM | HEIGHT: 66 IN | TEMPERATURE: 97.3 F | DIASTOLIC BLOOD PRESSURE: 86 MMHG | SYSTOLIC BLOOD PRESSURE: 137 MMHG | OXYGEN SATURATION: 94 %

## 2023-07-10 DIAGNOSIS — N18.9 CHRONIC KIDNEY DISEASE, UNSPECIFIED: ICD-10-CM

## 2023-07-10 DIAGNOSIS — Z79.2 LONG TERM (CURRENT) USE OF ANTIBIOTICS: ICD-10-CM

## 2023-07-10 DIAGNOSIS — D64.9 ANEMIA, UNSPECIFIED: ICD-10-CM

## 2023-07-10 DIAGNOSIS — Z94.1 HEART TRANSPLANT STATUS: ICD-10-CM

## 2023-07-10 DIAGNOSIS — H40.059 OCULAR HYPERTENSION, UNSPECIFIED EYE: ICD-10-CM

## 2023-07-10 DIAGNOSIS — Z94.1 HEART TRANSPLANT STATUS: Chronic | ICD-10-CM

## 2023-07-10 DIAGNOSIS — I10 ESSENTIAL (PRIMARY) HYPERTENSION: ICD-10-CM

## 2023-07-10 DIAGNOSIS — E11.9 TYPE 2 DIABETES MELLITUS WITHOUT COMPLICATIONS: ICD-10-CM

## 2023-07-10 DIAGNOSIS — A41.9 SEPSIS, UNSPECIFIED ORGANISM: ICD-10-CM

## 2023-07-10 DIAGNOSIS — Z29.9 ENCOUNTER FOR PROPHYLACTIC MEASURES, UNSPECIFIED: ICD-10-CM

## 2023-07-10 DIAGNOSIS — R65.10 SYSTEMIC INFLAMMATORY RESPONSE SYNDROME (SIRS) OF NON-INFECTIOUS ORIGIN WITHOUT ACUTE ORGAN DYSFUNCTION: ICD-10-CM

## 2023-07-10 DIAGNOSIS — D69.41 EVANS SYNDROME: ICD-10-CM

## 2023-07-10 DIAGNOSIS — I48.20 CHRONIC ATRIAL FIBRILLATION, UNSPECIFIED: ICD-10-CM

## 2023-07-10 LAB
ALBUMIN SERPL ELPH-MCNC: 4 G/DL — SIGNIFICANT CHANGE UP (ref 3.3–5)
ALP SERPL-CCNC: 53 U/L — SIGNIFICANT CHANGE UP (ref 40–120)
ALT FLD-CCNC: 11 U/L — SIGNIFICANT CHANGE UP (ref 10–45)
ANION GAP SERPL CALC-SCNC: 16 MMOL/L — SIGNIFICANT CHANGE UP (ref 5–17)
APPEARANCE UR: CLEAR — SIGNIFICANT CHANGE UP
AST SERPL-CCNC: 20 U/L — SIGNIFICANT CHANGE UP (ref 10–40)
BACTERIA # UR AUTO: NEGATIVE — SIGNIFICANT CHANGE UP
BASE EXCESS BLDV CALC-SCNC: -1.6 MMOL/L — SIGNIFICANT CHANGE UP (ref -2–3)
BASOPHILS # BLD AUTO: 0.03 K/UL — SIGNIFICANT CHANGE UP (ref 0–0.2)
BASOPHILS NFR BLD AUTO: 0.2 % — SIGNIFICANT CHANGE UP (ref 0–2)
BILIRUB SERPL-MCNC: 0.6 MG/DL — SIGNIFICANT CHANGE UP (ref 0.2–1.2)
BILIRUB UR-MCNC: NEGATIVE — SIGNIFICANT CHANGE UP
BUN SERPL-MCNC: 30 MG/DL — HIGH (ref 7–23)
CA-I SERPL-SCNC: 1.19 MMOL/L — SIGNIFICANT CHANGE UP (ref 1.15–1.33)
CALCIUM SERPL-MCNC: 9.2 MG/DL — SIGNIFICANT CHANGE UP (ref 8.4–10.5)
CHLORIDE BLDV-SCNC: 107 MMOL/L — SIGNIFICANT CHANGE UP (ref 96–108)
CHLORIDE SERPL-SCNC: 104 MMOL/L — SIGNIFICANT CHANGE UP (ref 96–108)
CO2 BLDV-SCNC: 26 MMOL/L — SIGNIFICANT CHANGE UP (ref 22–26)
CO2 SERPL-SCNC: 20 MMOL/L — LOW (ref 22–31)
COLOR SPEC: COLORLESS — SIGNIFICANT CHANGE UP
CREAT SERPL-MCNC: 1.17 MG/DL — SIGNIFICANT CHANGE UP (ref 0.5–1.3)
DIFF PNL FLD: NEGATIVE — SIGNIFICANT CHANGE UP
EGFR: 66 ML/MIN/1.73M2 — SIGNIFICANT CHANGE UP
EOSINOPHIL # BLD AUTO: 0.02 K/UL — SIGNIFICANT CHANGE UP (ref 0–0.5)
EOSINOPHIL NFR BLD AUTO: 0.1 % — SIGNIFICANT CHANGE UP (ref 0–6)
EPI CELLS # UR: 0 /HPF — SIGNIFICANT CHANGE UP
GAS PNL BLDV: 134 MMOL/L — LOW (ref 136–145)
GAS PNL BLDV: SIGNIFICANT CHANGE UP
GLUCOSE BLDV-MCNC: 92 MG/DL — SIGNIFICANT CHANGE UP (ref 70–99)
GLUCOSE SERPL-MCNC: 92 MG/DL — SIGNIFICANT CHANGE UP (ref 70–99)
GLUCOSE UR QL: NEGATIVE — SIGNIFICANT CHANGE UP
HCO3 BLDV-SCNC: 24 MMOL/L — SIGNIFICANT CHANGE UP (ref 22–29)
HCT VFR BLD CALC: 34.5 % — LOW (ref 39–50)
HCT VFR BLDA CALC: 30 % — LOW (ref 39–51)
HGB BLD CALC-MCNC: 10.1 G/DL — LOW (ref 12.6–17.4)
HGB BLD-MCNC: 9.9 G/DL — LOW (ref 13–17)
HYALINE CASTS # UR AUTO: 1 /LPF — SIGNIFICANT CHANGE UP (ref 0–2)
IMM GRANULOCYTES NFR BLD AUTO: 0.7 % — SIGNIFICANT CHANGE UP (ref 0–0.9)
KETONES UR-MCNC: NEGATIVE — SIGNIFICANT CHANGE UP
LACTATE BLDV-MCNC: 1.8 MMOL/L — SIGNIFICANT CHANGE UP (ref 0.5–2)
LEUKOCYTE ESTERASE UR-ACNC: NEGATIVE — SIGNIFICANT CHANGE UP
LYMPHOCYTES # BLD AUTO: 1.11 K/UL — SIGNIFICANT CHANGE UP (ref 1–3.3)
LYMPHOCYTES # BLD AUTO: 7.3 % — LOW (ref 13–44)
MCHC RBC-ENTMCNC: 28.7 GM/DL — LOW (ref 32–36)
MCHC RBC-ENTMCNC: 29.3 PG — SIGNIFICANT CHANGE UP (ref 27–34)
MCV RBC AUTO: 102.1 FL — HIGH (ref 80–100)
MONOCYTES # BLD AUTO: 0.57 K/UL — SIGNIFICANT CHANGE UP (ref 0–0.9)
MONOCYTES NFR BLD AUTO: 3.8 % — SIGNIFICANT CHANGE UP (ref 2–14)
NEUTROPHILS # BLD AUTO: 13.36 K/UL — HIGH (ref 1.8–7.4)
NEUTROPHILS NFR BLD AUTO: 87.9 % — HIGH (ref 43–77)
NITRITE UR-MCNC: NEGATIVE — SIGNIFICANT CHANGE UP
NRBC # BLD: 0 /100 WBCS — SIGNIFICANT CHANGE UP (ref 0–0)
PCO2 BLDV: 45 MMHG — SIGNIFICANT CHANGE UP (ref 42–55)
PH BLDV: 7.34 — SIGNIFICANT CHANGE UP (ref 7.32–7.43)
PH UR: 6 — SIGNIFICANT CHANGE UP (ref 5–8)
PLATELET # BLD AUTO: 274 K/UL — SIGNIFICANT CHANGE UP (ref 150–400)
PO2 BLDV: 65 MMHG — HIGH (ref 25–45)
POTASSIUM BLDV-SCNC: 4.9 MMOL/L — SIGNIFICANT CHANGE UP (ref 3.5–5.1)
POTASSIUM SERPL-MCNC: 4.7 MMOL/L — SIGNIFICANT CHANGE UP (ref 3.5–5.3)
POTASSIUM SERPL-SCNC: 4.7 MMOL/L — SIGNIFICANT CHANGE UP (ref 3.5–5.3)
PROT SERPL-MCNC: 6.7 G/DL — SIGNIFICANT CHANGE UP (ref 6–8.3)
PROT UR-MCNC: NEGATIVE — SIGNIFICANT CHANGE UP
RAPID RVP RESULT: SIGNIFICANT CHANGE UP
RBC # BLD: 3.38 M/UL — LOW (ref 4.2–5.8)
RBC # FLD: 20 % — HIGH (ref 10.3–14.5)
RBC CASTS # UR COMP ASSIST: 0 /HPF — SIGNIFICANT CHANGE UP (ref 0–4)
SAO2 % BLDV: 91.1 % — HIGH (ref 67–88)
SARS-COV-2 RNA SPEC QL NAA+PROBE: SIGNIFICANT CHANGE UP
SODIUM SERPL-SCNC: 140 MMOL/L — SIGNIFICANT CHANGE UP (ref 135–145)
SP GR SPEC: 1.01 — LOW (ref 1.01–1.02)
TROPONIN T, HIGH SENSITIVITY RESULT: 42 NG/L — SIGNIFICANT CHANGE UP (ref 0–51)
UROBILINOGEN FLD QL: NEGATIVE — SIGNIFICANT CHANGE UP
WBC # BLD: 15.2 K/UL — HIGH (ref 3.8–10.5)
WBC # FLD AUTO: 15.2 K/UL — HIGH (ref 3.8–10.5)
WBC UR QL: 0 /HPF — SIGNIFICANT CHANGE UP (ref 0–5)

## 2023-07-10 PROCEDURE — 99223 1ST HOSP IP/OBS HIGH 75: CPT | Mod: GC

## 2023-07-10 PROCEDURE — 99223 1ST HOSP IP/OBS HIGH 75: CPT

## 2023-07-10 PROCEDURE — 99285 EMERGENCY DEPT VISIT HI MDM: CPT

## 2023-07-10 PROCEDURE — 99233 SBSQ HOSP IP/OBS HIGH 50: CPT

## 2023-07-10 PROCEDURE — 99215 OFFICE O/P EST HI 40 MIN: CPT | Mod: 25

## 2023-07-10 PROCEDURE — 93000 ELECTROCARDIOGRAM COMPLETE: CPT | Mod: PD

## 2023-07-10 PROCEDURE — 71046 X-RAY EXAM CHEST 2 VIEWS: CPT | Mod: 26

## 2023-07-10 RX ORDER — MEROPENEM 1 G/30ML
500 INJECTION INTRAVENOUS ONCE
Refills: 0 | Status: COMPLETED | OUTPATIENT
Start: 2023-07-10 | End: 2023-07-10

## 2023-07-10 RX ORDER — GLUCAGON INJECTION, SOLUTION 0.5 MG/.1ML
1 INJECTION, SOLUTION SUBCUTANEOUS ONCE
Refills: 0 | Status: DISCONTINUED | OUTPATIENT
Start: 2023-07-10 | End: 2023-07-18

## 2023-07-10 RX ORDER — MEROPENEM 1 G/30ML
INJECTION INTRAVENOUS
Refills: 0 | Status: COMPLETED | OUTPATIENT
Start: 2023-07-10 | End: 2023-07-14

## 2023-07-10 RX ORDER — BUMETANIDE 0.25 MG/ML
1 INJECTION INTRAMUSCULAR; INTRAVENOUS DAILY
Refills: 0 | Status: DISCONTINUED | OUTPATIENT
Start: 2023-07-10 | End: 2023-07-12

## 2023-07-10 RX ORDER — FEBUXOSTAT 40 MG/1
1 TABLET ORAL
Qty: 0 | Refills: 0 | DISCHARGE

## 2023-07-10 RX ORDER — INSULIN LISPRO 100/ML
VIAL (ML) SUBCUTANEOUS
Refills: 0 | Status: DISCONTINUED | OUTPATIENT
Start: 2023-07-10 | End: 2023-07-11

## 2023-07-10 RX ORDER — NYSTATIN 500MM UNIT
500000 POWDER (EA) MISCELLANEOUS THREE TIMES A DAY
Refills: 0 | Status: DISCONTINUED | OUTPATIENT
Start: 2023-07-10 | End: 2023-07-18

## 2023-07-10 RX ORDER — VALGANCICLOVIR 450 MG/1
450 TABLET, FILM COATED ORAL
Refills: 0 | Status: DISCONTINUED | OUTPATIENT
Start: 2023-07-10 | End: 2023-07-18

## 2023-07-10 RX ORDER — INSULIN LISPRO 100/ML
VIAL (ML) SUBCUTANEOUS AT BEDTIME
Refills: 0 | Status: DISCONTINUED | OUTPATIENT
Start: 2023-07-10 | End: 2023-07-11

## 2023-07-10 RX ORDER — SODIUM CHLORIDE 9 MG/ML
1000 INJECTION, SOLUTION INTRAVENOUS
Refills: 0 | Status: DISCONTINUED | OUTPATIENT
Start: 2023-07-10 | End: 2023-07-18

## 2023-07-10 RX ORDER — DEXTROSE 50 % IN WATER 50 %
25 SYRINGE (ML) INTRAVENOUS ONCE
Refills: 0 | Status: DISCONTINUED | OUTPATIENT
Start: 2023-07-10 | End: 2023-07-18

## 2023-07-10 RX ORDER — TACROLIMUS 5 MG/1
1 CAPSULE ORAL
Refills: 0 | Status: DISCONTINUED | OUTPATIENT
Start: 2023-07-10 | End: 2023-07-11

## 2023-07-10 RX ORDER — ASPIRIN/CALCIUM CARB/MAGNESIUM 324 MG
81 TABLET ORAL DAILY
Refills: 0 | Status: DISCONTINUED | OUTPATIENT
Start: 2023-07-10 | End: 2023-07-18

## 2023-07-10 RX ORDER — PATIROMER 16.8 G/1
16.8 POWDER, FOR SUSPENSION ORAL
Qty: 16 | Refills: 5 | Status: DISCONTINUED | COMMUNITY
Start: 2023-03-31 | End: 2023-07-10

## 2023-07-10 RX ORDER — IRON SUCROSE 20 MG/ML
100 INJECTION, SOLUTION INTRAVENOUS
Refills: 0 | Status: DISCONTINUED | OUTPATIENT
Start: 2023-07-10 | End: 2023-07-11

## 2023-07-10 RX ORDER — ACETAMINOPHEN 500 MG
650 TABLET ORAL EVERY 6 HOURS
Refills: 0 | Status: DISCONTINUED | OUTPATIENT
Start: 2023-07-10 | End: 2023-07-18

## 2023-07-10 RX ORDER — PRAVASTATIN SODIUM 20 MG/1
20 TABLET ORAL
Qty: 28 | Refills: 5 | Status: DISCONTINUED | COMMUNITY
Start: 2018-04-12 | End: 2023-07-10

## 2023-07-10 RX ORDER — METOPROLOL TARTRATE 50 MG
100 TABLET ORAL DAILY
Refills: 0 | Status: DISCONTINUED | OUTPATIENT
Start: 2023-07-10 | End: 2023-07-12

## 2023-07-10 RX ORDER — DEXTROSE 50 % IN WATER 50 %
15 SYRINGE (ML) INTRAVENOUS ONCE
Refills: 0 | Status: DISCONTINUED | OUTPATIENT
Start: 2023-07-10 | End: 2023-07-18

## 2023-07-10 RX ORDER — INSULIN GLARGINE 100 [IU]/ML
8 INJECTION, SOLUTION SUBCUTANEOUS AT BEDTIME
Refills: 0 | Status: DISCONTINUED | OUTPATIENT
Start: 2023-07-10 | End: 2023-07-10

## 2023-07-10 RX ORDER — PANTOPRAZOLE 40 MG/1
40 TABLET, DELAYED RELEASE ORAL
Qty: 28 | Refills: 5 | Status: DISCONTINUED | COMMUNITY
Start: 2020-05-29 | End: 2023-07-10

## 2023-07-10 RX ORDER — INSULIN LISPRO 100/ML
7 VIAL (ML) SUBCUTANEOUS
Refills: 0 | Status: DISCONTINUED | OUTPATIENT
Start: 2023-07-10 | End: 2023-07-10

## 2023-07-10 RX ORDER — MEROPENEM 1 G/30ML
500 INJECTION INTRAVENOUS EVERY 8 HOURS
Refills: 0 | Status: COMPLETED | OUTPATIENT
Start: 2023-07-11 | End: 2023-07-14

## 2023-07-10 RX ORDER — ATORVASTATIN CALCIUM 80 MG/1
10 TABLET, FILM COATED ORAL AT BEDTIME
Refills: 0 | Status: DISCONTINUED | OUTPATIENT
Start: 2023-07-10 | End: 2023-07-18

## 2023-07-10 RX ORDER — FOLIC ACID 1 MG/1
1 TABLET ORAL DAILY
Qty: 30 | Refills: 5 | Status: DISCONTINUED | COMMUNITY
Start: 2020-11-12 | End: 2023-07-10

## 2023-07-10 RX ORDER — PANTOPRAZOLE SODIUM 20 MG/1
40 TABLET, DELAYED RELEASE ORAL
Refills: 0 | Status: DISCONTINUED | OUTPATIENT
Start: 2023-07-10 | End: 2023-07-18

## 2023-07-10 RX ORDER — LATANOPROST/PF 0.005 %
0.01 DROPS OPHTHALMIC (EYE) DAILY
Refills: 0 | Status: ACTIVE | COMMUNITY
Start: 2023-07-10

## 2023-07-10 RX ORDER — LATANOPROST 0.05 MG/ML
1 SOLUTION/ DROPS OPHTHALMIC; TOPICAL AT BEDTIME
Refills: 0 | Status: DISCONTINUED | OUTPATIENT
Start: 2023-07-10 | End: 2023-07-18

## 2023-07-10 RX ORDER — DEXTROSE 50 % IN WATER 50 %
12.5 SYRINGE (ML) INTRAVENOUS ONCE
Refills: 0 | Status: DISCONTINUED | OUTPATIENT
Start: 2023-07-10 | End: 2023-07-18

## 2023-07-10 RX ORDER — FEBUXOSTAT 40 MG/1
40 TABLET ORAL DAILY
Qty: 30 | Refills: 5 | Status: DISCONTINUED | COMMUNITY
Start: 2023-03-22 | End: 2023-07-10

## 2023-07-10 RX ORDER — LANOLIN ALCOHOL/MO/W.PET/CERES
3 CREAM (GRAM) TOPICAL AT BEDTIME
Refills: 0 | Status: DISCONTINUED | OUTPATIENT
Start: 2023-07-10 | End: 2023-07-18

## 2023-07-10 RX ORDER — APIXABAN 2.5 MG/1
5 TABLET, FILM COATED ORAL EVERY 12 HOURS
Refills: 0 | Status: DISCONTINUED | OUTPATIENT
Start: 2023-07-10 | End: 2023-07-18

## 2023-07-10 RX ADMIN — ATORVASTATIN CALCIUM 10 MILLIGRAM(S): 80 TABLET, FILM COATED ORAL at 22:56

## 2023-07-10 RX ADMIN — MEROPENEM 100 MILLIGRAM(S): 1 INJECTION INTRAVENOUS at 23:07

## 2023-07-10 RX ADMIN — Medication 10 MILLIGRAM(S): at 22:56

## 2023-07-10 RX ADMIN — LATANOPROST 1 DROP(S): 0.05 SOLUTION/ DROPS OPHTHALMIC; TOPICAL at 22:56

## 2023-07-10 NOTE — ED PROVIDER NOTE - OBJECTIVE STATEMENT
73-year-old male past medical history of systolic heart failure, last ejection fraction 55%, LVAD in 2017, status post heart transplant 2018 with hepatic see positive donor, complicated by bilateral IJ/subclavian thrombi, acute on chronic renal failure with unclear etiology, admission in 2022 due to relapse of thrombocytopenia (Chepe's syndrome, followed by heme-onc), previous previous admission in 2023 due to hypoglycemia and NORMAN status post discharge to rehab center on 6–23-23 presents after patient went to see cardiac transplant doctorkim he tried contacting his rehab center, [facility] [place], multiple times due to unknown usage of IV meropenem antibiotics.  Patient presents emergency department due to admitted on usage of IV meropenem in the setting of immunocompromisation due to cardiac transplant.  Patient would not like to return to [place] rehab center.  Patient is currently working with cardiac transplant  noted to get home placement with full time aid 73-year-old male past medical history of systolic heart failure, last ejection fraction 55%, LVAD in 2017, status post heart transplant 2018 with hep c positive donor, complicated by bilateral IJ/subclavian thrombi, acute on chronic renal failure with unclear etiology, admission in 2022 due to relapse of thrombocytopenia (delfino's syndrome, followed by heme-onc), previous admission in 2023 due to hypoglycemia and NORMAN status post discharge to rehab center on 6–23-23 presents after patient went to see cardiac transplant doctorkim he tried contacting his rehab center, [facility], multiple times due to unknown usage of IV meropenem antibiotics.  Patient presents emergency department due to admitted on usage of IV meropenem in the setting of immunocompromised due to cardiac transplant.  Patient would not like to return to [place] rehab center.  Patient is currently working with cardiac transplant  noted to get home placement with full time aid

## 2023-07-10 NOTE — ED PROVIDER NOTE - CLINICAL SUMMARY MEDICAL DECISION MAKING FREE TEXT BOX
73-year-old male past medical history of systolic heart failure, last ejection fraction 55%, LVAD in 2017, status post heart transplant 2018 with hep c positive donor, complicated by bilateral IJ/subclavian thrombi, acute on chronic renal failure with unclear etiology, admission in 2022 due to relapse of thrombocytopenia (delfino's syndrome, followed by heme-onc), previous admission in 2023 due to hypoglycemia and NORMAN status post discharge to rehab center on 6–23-23 presents after patient went to see cardiac transplant doctorkim he tried contacting his rehab center, [facility] [place], multiple times due to unknown usage of IV meropenem antibiotics. patient VS wnl. patient not currently septic. will work patient up with septic labs to eval use of merpenum of unknown reason. will likely need admission for continued workup.

## 2023-07-10 NOTE — H&P ADULT - NSHPLABSRESULTS_GEN_ALL_CORE
LABS: Personally reviewed labs, imaging, and ECG                          9.9    15.20 )-----------( 274      ( 10 Jul 2023 14:11 )             34.5       07-10    140  |  104  |  30<H>  ----------------------------<  92  4.7   |  20<L>  |  1.17    Ca    9.2      10 Jul 2023 14:11    TPro  6.7  /  Alb  4.0  /  TBili  0.6  /  DBili  x   /  AST  20  /  ALT  11  /  AlkPhos  53  07-10       LIVER FUNCTIONS - ( 10 Jul 2023 14:11 )  Alb: 4.0 g/dL / Pro: 6.7 g/dL / ALK PHOS: 53 U/L / ALT: 11 U/L / AST: 20 U/L / GGT: x                    Urinalysis Basic - ( 10 Jul 2023 17:19 )    Color: Colorless / Appearance: Clear / S.008 / pH: x  Gluc: x / Ketone: Negative  / Bili: Negative / Urobili: Negative   Blood: x / Protein: Negative / Nitrite: Negative   Leuk Esterase: Negative / RBC: 0 /hpf / WBC 0 /HPF   Sq Epi: x / Non Sq Epi: x / Bacteria: Negative            Lactate Trend            CAPILLARY BLOOD GLUCOSE      POCT Blood Glucose.: 101 mg/dL (10 Jul 2023 13:21)        Culture Results:   No Growth Final ( @ 09:25)  Culture Results:   No Growth Final ( @ 09:25)      RADIOLOGY & ADDITIONAL TESTS: Personally reviewed labs, imaging, and ECG                          9.9    15.20 )-----------( 274      ( 10 Jul 2023 14:11 )             34.5       07-10    140  |  104  |  30<H>  ----------------------------<  92  4.7   |  20<L>  |  1.17    Ca    9.2      10 Jul 2023 14:11    TPro  6.7  /  Alb  4.0  /  TBili  0.6  /  DBili  x   /  AST  20  /  ALT  11  /  AlkPhos  53  07-10       LIVER FUNCTIONS - ( 10 Jul 2023 14:11 )  Alb: 4.0 g/dL / Pro: 6.7 g/dL / ALK PHOS: 53 U/L / ALT: 11 U/L / AST: 20 U/L / GGT: x                    Urinalysis Basic - ( 10 Jul 2023 17:19 )    Color: Colorless / Appearance: Clear / S.008 / pH: x  Gluc: x / Ketone: Negative  / Bili: Negative / Urobili: Negative   Blood: x / Protein: Negative / Nitrite: Negative   Leuk Esterase: Negative / RBC: 0 /hpf / WBC 0 /HPF   Sq Epi: x / Non Sq Epi: x / Bacteria: Negative            Lactate Trend            CAPILLARY BLOOD GLUCOSE      POCT Blood Glucose.: 101 mg/dL (10 Jul 2023 13:21)        Culture Results:   No Growth Final ( @ 09:25)  Culture Results:   No Growth Final ( @ 09:25)      RADIOLOGY & ADDITIONAL TESTS:

## 2023-07-10 NOTE — DISCUSSION/SUMMARY
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 \par hemolytic anemia/recent thrombocytopenia. Admission in feb for recurrent hem anemia. d/c on pred 75mg. Follow by Dr Rosario. Current dose of pred 30. Plans are for initiation of ritoxan because of persistent anemia (not yet started). \par intolerant of cellcept due leukopenia. 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 everolimus d/c for that reason. \par Annual Feb 2023: no obstructive CAD. 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 \par Last Bx Feb 2022 OR.  C4D 50%. +1 staining.No histopath features of AMR. No Hx  DSAs. \par last allosure 5.17: < .12 \par Baseline renal function prior to most recent admission 1.4. \par Admitted: March 3-18. viral pneumonia. metapneumovirus. Afib flutter. self converted. \par Admission May 23-6.2 Recurrent falls and volume overload. Diuresis. Mild LV dysfunction. No biopsy due to stable allosure. \par Admission June 14-23: Found at home semi-concious. Hypoglycemia. Insulin adjusted. d/c nursing home pending resolution of adequate home supports. \par undergoing rehab 90 day. No communications with nursing facility despite multiple attempts. ? treated with abs. Received Fe. \par d/c meds; prograf 3/3 , off cellcept , pred 30, eloquis 5 bid, asa nystatin, bactrim TIW, valcyte 450 bid, statin, toprol 200 (now 100), bumex .5 QOD, (1 QD) insulin \par now on meropenem \par Feeling OK. Ambulating short distances. No falls. \par 137/86, afeb, 116 180 (177) \par mild volume overload. \par no recent labs available. \par d/c labs- Cr 1.7\par TTE: 6.16- LVED 4.6, LVEF 58, mild enlarged. TAPSE 1.0 trace TR. \par discussed with patient and team. patient has active infection on IV dayne. \par communication with the nursing home "[facility]" by our transplant coordinators and  has been impossible. \par Will admit to hospital for work up of current information with plans for d/c this week when stable with home health aid and visiting nurse X3/week\par ? ritoxan while he is here \par Marvin Campbell \par 60 mins \par \par

## 2023-07-10 NOTE — CONSULT NOTE ADULT - PROBLEM SELECTOR RECOMMENDATION 9
- c/w tacro 2mg BID;   - Please send daily Tacro level; goal tacro level 4-6,   remains off cellcept  heartcare normal 5/17, DSA pending   - Will have transplant SW team follow up to establish plan for Safe discharge when ready as he WILL NOT be Safe to going back to prior rehab facility - c/w tacro 2mg BID;   - Please send daily Tacro level; goal tacro level 4-6,   remains off cellcept  heartcare normal 5/17, DSA pending   - Will have transplant SW team follow up to establish plan for Safe discharge when ready as he WILL NOT be Safe to going back to prior rehab facility  - Repeat TTE

## 2023-07-10 NOTE — ED ADULT NURSE REASSESSMENT NOTE - NS ED NURSE REASSESS COMMENT FT1
Report received from Erica METCALF in [facility]. As per MD Kendrick, was able to contact cardiologist and as per MD Blanco would like a different placement for rehab center because cardiology/transplant team is having difficulty getting in contact with rehab center. Reports that he was also started on meropenum for unknown reason. RN attempted for a new PIV, unsuccessful. MD Espino made aware that patient needs an ultrasound guided PIV.

## 2023-07-10 NOTE — H&P ADULT - PROBLEM SELECTOR PLAN 5
Pt takes admelog 7u tid and glargine 8u at bedtime    -ISS  -CC  -c/w home insulin Pt takes admelog 7u tid and glargine 8u at bedtime    -ISS  -CC  -hold home insulin for now as blood glucose of 92 and hx of hypoglycemia

## 2023-07-10 NOTE — H&P ADULT - ATTENDING COMMENTS
Pt was seen and examined during key portion of E/M service. Case discussed with resident. H&P reviewed and edited where appropriate. Other than the following, I agree with the above history, exam, assessment, and plan.  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   CT scan of chest/abdomen. Per ID 1 week of IV meropenem and then discontinue. need to call rehab in AM. f/u transplant surg recs. f/u cultures. cont prednisone and prograf. heme consult in AM

## 2023-07-10 NOTE — CONSULT NOTE ADULT - PROBLEM SELECTOR PROBLEM 3
Receiving intravenous antibiotic treatment at home
Receiving intravenous antibiotic treatment at home

## 2023-07-10 NOTE — ED ADULT NURSE REASSESSMENT NOTE - NS ED NURSE REASSESS COMMENT FT1
Report received from RN Kathy, pt found in position of comfort. AOx4, MAEx4, respirations even and unlabored, abd soft nontender/nondistended, skin warm dry and normal for race. Pt denies c/p, sob, n/v/d, dizziness, weakness. Pt is well appearing at this time. Pt pending 2nd set of blood cultures, pt refusing any further blood draws at this time, endorsing that it was already attempted twice on day shift. ED MD already made aware. Patient safety maintained, bed is in lowest position, wheels locked, and side rails raised. Patient oriented to call bell, and call bell is within reach.

## 2023-07-10 NOTE — ED ADULT NURSE NOTE - OBJECTIVE STATEMENT
Pt is a 73y M presenting to ED ambulatory with steady gait coming from doctor's office c/o need for admission as per pt. Pt states "I came from rehab today to a cardiology appointment and my doctor said he is not happy with the rehab I'm at. He said they don't answer his calls and he doesn't know what meds I've been receiving for the past 10 days and I can't go back there. He told me I need to come back to the hospital". Pt PMH heart transplant 2018, HTN, GERD, Acute renal failure, CKD, DM and recent admission for NORMAN/hypoglycemia. Pt has no medical complaints at this time. Awaiting to be seen by MD Espino.

## 2023-07-10 NOTE — HISTORY OF PRESENT ILLNESS
[FreeTextEntry1] : Mr. Baez is a 73 year old man with past medical history of a nonischemic cardiomyopathy and chronic systolic heart failure s/p HM2 LVAD in June 2017 complicated by recurrent GI hemorrhage and possible pump thrombosis who underwent heart transplant on 2/23/18 with a hepatitis C positive donor. His course was complicated by bilateral IJ/subclavian thrombi, a persistent small right sided pleural effusion, as well as acute on chronic renal failure of unclear etiology. In addition, his post-operative course was complicated by graft dysfunction of unclear etiology and persistent C4d positive staining on endomyocardial biopsy with negative DSAs. He developed joshua evidence of graft dysfunction leading to treatment with plasmapheresis, IVIG, and rituximab. Subsequently in June of 2018 he was found to have an pneumonia, that was ultimately diagnosed as Nocardia. This was successfully treated with therapy completed in August 2019.\par \par In the spring 2020, he was admitted with hemolytic anemia of unclear etiology. There were no clear precipitating factors, such as infection. He was treated with prednisone and Rituximab. Given the potential for CNI inhibitors leading to hemolytic anemia, we transitioned him to everolimus and he remained on high dose prednisone. He was subsequently admitted with acute anemia (not hemolytic). He developed severe leukopenia and Klebsiella bacteremia. Following treatment of this, he developed a severe C. diff colitis infection leading to a prolonged recovery. He was weaned to lower dose prednisone and the everolimus was transitioned back to tacrolimus. At the time of discharge he was found to have low levels of CMV viremia and was started back on valganciclovir. 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 Discharged home and had been following closely with heme/onc.\par \par Pt readmitted 3/3-3/18/23 who presented to [facility] ER on with new onset of chest tightness, found to be in Aflutter/fib. Hospital course c/b by URI w/ human metapneumovirus, normal LV function, acute on chronic kidney injury likely 2/2 hypovolemia, worsening leukocytosis with CT chest c/f PNA and hyperglycemia. Heme/onc consulted for hemolytic anemia management and prednisone dosing reduced to 60mg daily. Endocrine consulted for new onset of hyperglycemia and insulin teaching.  Bacterial infection-work up negative to date however had an elevated Fungitell.  Treated with broad spectrum abx from 3/3 to 3/8 (zosyn then cefepime).  Pt was cleared for discharge home on 3/18.\par \par Pt was re-admitted on who p/w on 6/14/23 recurrent hypoglycemia at home 2/2 poor PO intake and NORMAN SCr 3.1. Endocrine adjusted insulin regimen, pt was hydrated and SCr returned to baseline . Pt was discharged to rehab center on 6/23/23. Discharge weight 177lbs.\par \par Pt here today for follow up clinic visit since being [facility] [place]. Pt reports feeling "so-so". He is not happy at the rehab center. Pt participates in rehab daily 1.5 hours. He uses wheelchair. He denied SOB, CP, palpitations, N/V/D, fever, chills or LE edema. He reports he was receiving IV antibiotics for "something" but he didn't know what. He does not know how his vital signs have been or how his fingersticks have been. The rehab facility has not provided any bloodwork results or any other testing results or progress notes. \par \par Health maintenance:\par DEXA 5/2019, 6/2021: osteopenia, 6/2022: worsening osteopenia. Followed by Dr. Clemens.\par CT Colon (4/2017) IMPRESSION: No colonic polyp or mass.\par PSA 6.76 (4/29/2022)\par HgA1c 4.7% (2/17/23) --> 7.3 % (5/18/23)

## 2023-07-10 NOTE — H&P ADULT - PROBLEM SELECTOR PLAN 1
Pt w/ leukocytosis and tachycardia on admission, started on IV meropenem on 7/8 at rehab for unknown reason. CXR showing R basilar atelectasis.    -trend WBC  -blood cx  -urine cx  -CXR atelectasis  -RVP neg Pt w/ leukocytosis and tachycardia on admission, started on IV meropenem on 7/8 at rehab for unknown reason. CXR showing R basilar atelectasis.    -trend WBC  -c/w meropenem  -call rehab in AM for collateral regarding infectious source  -blood cx  -urine cx  -CXR atelectasis  -RVP neg Pt w/ leukocytosis and tachycardia on admission, started on IV meropenem on 7/8 at rehab for unknown reason. CXR showing R basilar atelectasis.    -trend WBC  -c/w meropenem  -call rehab in AM for collateral regarding infectious source  -blood cx  -urine cx  -CXR atelectasis  -RVP neg    - seen by transplant ID who recs:  Obtain CT scan of chest/abdomen  check ESR and CRP  send quantiferon - as patient with PPD in his right forearm  Complete a week of IV meropenem and then discontinue

## 2023-07-10 NOTE — ED ADULT NURSE REASSESSMENT NOTE - NS ED NURSE REASSESS COMMENT FT1
Pt f/s 89, provided with apple juice and ginger ale . Pt f/s 89, provided with apple juice and crackers

## 2023-07-10 NOTE — ED ADULT TRIAGE NOTE - CHIEF COMPLAINT QUOTE
was at [facility] for rehab- patient came to [facility] today for appointment- MD's dropped off to ED to be placed in new rehab because "they don't like what they're doing there". Pt denies chest pain, sob. was at [facility] for rehab s/p recurrent hypoglycemia/NORMAN- patient came to [facility] today for appointment-sent for readmission for new placement as patient does not like the rehab. Pt denies chest pain, sob.

## 2023-07-10 NOTE — H&P ADULT - ASSESSMENT
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

## 2023-07-10 NOTE — CONSULT NOTE ADULT - SUBJECTIVE AND OBJECTIVE BOX
ADVANCED HEART FAILURE & TRANSPLANT  - PROGRESS NOTE  *To reach the NS1 Team from 8am to 5pm (MON-FRI), please call [phone],   _______________________________________________________________________________________________________     HPI:      PAST MEDICAL & SURGICAL HISTORY:  HTN      SVT (Supraventricular Tachycardia)      Non-Ischemic Cardiomyopathy  now s/p transplant 2018      GIB (gastrointestinal bleeding)      Hepatitis C virus      DVT of upper extremity (deep vein thrombosis)      Former smoker      HLD (hyperlipidemia)      Knee pain, right      H/O autoimmune hemolytic anemia      H/O hemolytic anemia      Status post left hip replacement      H/O heart transplant  2/2018          REVIEW OF SYSTEMS      General:	    Skin/Breast:  	  Ophthalmologic:  	  ENMT:	    Respiratory and Thorax:  	  Cardiovascular:	    Gastrointestinal:	    Genitourinary:	    Musculoskeletal:	    Neurological:	    Psychiatric:	    Hematology/Lymphatics:	    Endocrine:	    Allergic/Immunologic:	    MEDICATIONS  (STANDING):    MEDICATIONS  (PRN):      Allergies    No Known Allergies    Intolerances        SOCIAL HISTORY:    FAMILY HISTORY:      Vital Signs Last 24 Hrs  T(C): 37 (10 Jul 2023 13:58), Max: 37.2 (10 Jul 2023 11:04)  T(F): 98.6 (10 Jul 2023 13:58), Max: 98.9 (10 Jul 2023 11:04)  HR: 96 (10 Jul 2023 13:58) (96 - 106)  BP: 122/80 (10 Jul 2023 13:58) (113/79 - 122/80)  BP(mean): --  RR: 18 (10 Jul 2023 13:58) (18 - 18)  SpO2: 100% (10 Jul 2023 13:58) (98% - 100%)    Parameters below as of 10 Jul 2023 13:58  Patient On (Oxygen Delivery Method): room air        PHYSICAL EXAM:      Constitutional:    Eyes:    ENMT:    Neck:    Breasts:    Back:    Respiratory:    Cardiovascular:    Gastrointestinal:    Genitourinary:    Rectal:    Extremities:    Vascular:    Neurological:    Skin:    Lymph Nodes:    Musculoskeletal:    Psychiatric:        LABS:                        9.9    15.20 )-----------( 274      ( 10 Jul 2023 14:11 )             34.5                 RADIOLOGY & ADDITIONAL STUDIES: ADVANCED HEART FAILURE & TRANSPLANT  - PROGRESS NOTE  *To reach the NS1 Team from 8am to 5pm (MON-FRI), please call [phone],   _______________________________________________________________________________________________________     Mr. Baez is a 73 year old man with past medical history of a nonischemic cardiomyopathy and chronic systolic heart failure s/p HM2 LVAD in June 2017 complicated by recurrent GI hemorrhage and possible pump thrombosis who underwent heart transplant on 2/23/18 with a hepatitis C positive donor. His course was complicated by bilateral IJ/subclavian thrombi, a persistent small right sided pleural effusion, as well as acute on chronic renal failure of unclear etiology. In addition, his post-operative course was complicated by graft dysfunction of unclear etiology and persistent C4d positive staining on endomyocardial biopsy with negative DSAs. He developed joshua evidence of graft dysfunction leading to treatment with plasmapheresis, IVIG, and rituximab. Subsequently in June of 2018 he was found to have an pneumonia, that was ultimately diagnosed as Nocardia. This was successfully treated with therapy completed in August 2019. He was referred to present to ED after presenting to clinic today from rehab and was started on antibiotics for "something". Appears he is receiving IV meropenem for unknown indication as communication with rehab facility "[facility]" has been challenging. Denies fevers chills, nausea vomiting. Also, appears he received PPD which he is unsure when.         PAST MEDICAL & SURGICAL HISTORY:  HTN  SVT (Supraventricular Tachycardia)  Non-Ischemic Cardiomyopathy  now s/p transplant 2018  GIB (gastrointestinal bleeding)  Hepatitis C virus  DVT of upper extremity (deep vein thrombosis)  Former smoker  HLD (hyperlipidemia)  Knee pain, right  H/O autoimmune hemolytic anemia  H/O hemolytic anemia  Status post left hip replacement  H/O heart transplant  2/2018      FAMILY HISTORY:      Home Medications:  Admelog 100 units/mL injectable solution: 7 unit(s) injectable once a day before breakfast (23 Jun 2023 11:54)  apixaban 5 mg oral tablet: 1 tab(s) orally every 12 hours (20 Jun 2023 14:18)  aspirin 81 mg oral tablet, chewable: 1 tab(s) orally once a day (20 Jun 2023 14:18)  febuxostat 40 mg oral tablet: 1 tab(s) orally once a day (15 Kennedy 2023 12:03)  insulin glargine 100 units/mL subcutaneous solution: 8 unit(s) subcutaneous once a day (in the morning) (23 Jun 2023 11:56)  metoprolol succinate 100 mg oral tablet, extended release: 1 tab(s) orally once a day (20 Jun 2023 14:18)  nystatin 100,000 units/mL oral suspension: 5 milliliter(s) orally 3 times a day (20 Jun 2023 14:18)  pantoprazole 40 mg oral delayed release tablet: 1 tab(s) orally once a day (before a meal) (15 Kennedy 2023 12:03)  pravastatin 20 mg oral tablet: 1 tab(s) orally once a day (15 Kennedy 2023 12:03)  predniSONE 10 mg oral tablet: 3 tab(s) orally once a day (20 Jun 2023 14:18)  sodium zirconium cyclosilicate: 5 gram(s) orally once a day (23 Jun 2023 11:54)  sulfamethoxazole-trimethoprim 400 mg-80 mg oral tablet: 1 tab(s) orally 3 times a week (23 Jun 2023 11:54)  tacrolimus 1 mg oral capsule: 3 cap(s) orally 2 times a day (23 Jun 2023 11:56)  valGANciclovir 450 mg oral tablet: 1 tab(s) orally 2 times a week (23 Jun 2023 11:54)      Medications:    Review of systems:  10 point review of systems completed and are negative unless noted in HPI    Vitals:  T(C): 37 (07-10-23 @ 13:58), Max: 37.2 (07-10-23 @ 11:04)  HR: 96 (07-10-23 @ 13:58) (96 - 106)  BP: 122/80 (07-10-23 @ 13:58) (113/79 - 122/80)  BP(mean): --  RR: 18 (07-10-23 @ 13:58) (18 - 18)  SpO2: 100% (07-10-23 @ 13:58) (98% - 100%)    Daily Height in cm: 170.18 (10 Jul 2023 11:04)    Daily     Weight (kg): 81.6 (07-10 @ 11:04)    I&O's Summary    Physical Exam:  Appearance: No Acute Distress  HEENT: PERRL  Neck: JVP not elevated  Cardiovascular: Normal S1 S2, No murmurs/rubs/gallops  Respiratory: Clear to auscultation bilaterally  Gastrointestinal: Soft, Non-tender	  Skin: No cyanosis	  Neurologic: Non-focal  Extremities: No LE edema  Psychiatry: A & O x 3, Mood & affect appropriate    Labs:                        9.9    15.20 )-----------( 274      ( 10 Jul 2023 14:11 )             34.5     07-10    140  |  104  |  30<H>  ----------------------------<  92  4.7   |  20<L>  |  1.17    Ca    9.2      10 Jul 2023 14:11    TPro  6.7  /  Alb  4.0  /  TBili  0.6  /  DBili  x   /  AST  20  /  ALT  11  /  AlkPhos  53  07-10

## 2023-07-10 NOTE — H&P ADULT - PROBLEM SELECTOR PLAN 3
-trend Hgb, currently at baseline  - Currently on Prednisone 30mg QD  - per transplant team, recommending Hematology consult; follows with Dr. Rosario for hemolytic anemia; As an outpt discussed possibly slow wean of prednisone  - if he is Not infected would consider rituximab while admitted. -trend Hgb, currently at baseline  - c/w Prednisone 30mg QD  - per transplant team, they recommend Hematology consult for consideration of rituxan while admitted; follows with Dr. Rosario for hemolytic anemia; As an outpt discussed possibly slow wean of prednisone  -restart protonix

## 2023-07-10 NOTE — CARDIOLOGY SUMMARY
[de-identified] : \par EKG 4/3/23: NSR 91bpm +RBBB\par EKG 12/28/22: NSR 95 bpm +RBBB [de-identified] : DSE 12/22/2020: Conclusions:\par 1. Normal hemodynamic response.\par 2. Normal electrocardiographic response.\par 3. Overall preserved left ventricular systolic function\par with mild hypokinesis of the mid to distal anterior wall at\par baseline. Normal augmentation in left ventricular systolic\par function with dobutamine infusion.\par 4. No evidence of inducible ischemia on stress\par echocardiogram images.\par *** Compared with echocardiogram of 12/2/2020, overall\par preserved left ventricular systolic function at baseline\par with mild hypokiesis of the mid to distal anterior wall.\par Normal augmentation in left ventricular systolic function\par with dobutamine infusion.\par \par 2/20/20: DSE EF 50-55% no evidence of inducible ischemia on pharmacologic stress echo images [de-identified] : \par \par \par TTE 6/16/23: CONCLUSIONS:\par  1. Normal left ventricular cavity size. The left ventricular wall thickness is normal. The left ventricular systolic function is normal with an ejection fraction of 56 % by Sherman's method of disks. There are no regional wall motion abnormalities seen.\par  2. Mildly enlarged right ventricular cavity size, normal wall thickness and probably normal systolic function. The tricuspid annular plane systolic excursion (TAPSE) is 1.0 cm (normal >=1.7 cm).\par  3. Mild mitral regurgitation.\par  4. Compared to the transthoracic echocardiogram performed on 5/19/2023 there have been no significant interval changes.\par TTE 3/3/23: LVIDd 3.6, EF 50-55%, concentric remodeling \par TTE 7/25/22: EF 60%, LVIDD 4.5cm, normal LV function, normal RV size with decreased RV function, mild TR, no pericardial effusion\par \par TTE 2/28/22: EF 65% LVIDD not calculated. normal biV function. 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 minimal TR. no pericardial effusion. [de-identified] : \par CT chest/abd/pelvis 3/14/23: IMPRESSION:\par Chest: Improving aeration of previously impacted right lower lobe distal  airways with improving bibasilar atelectasis. Additionally, there is a \par small clustered area of unchanged nodular opacities which may represent \par superimposed infection or inflammation. 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  No additional coronary-cameral fistulae noted. 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 IVUS performed of the RCA\par which was normal. Normal RHC\par \par 12/2/2020: RHC/Biopsy ISHLT Grade 0R\par 11/18/2020:  L/RHC w/ IVUS:\par CORONARY VESSELS: The coronary circulation is right dominant.\par LM:   --  LM: The vessel was very large sized. Angiography showed\par aneurysmal dilatation.\par LAD:   --  Proximal LAD: The vessel was very large sized. Angiography\par showed aneurysmal dilatation.\par --  Mid LAD: The vessel was very large sized and excessively ectatic. A\par fistula to the left ventricle was identified.\par --  Distal LAD: Normal. 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 No significant intimal thickening/hyperplasia or\par plaque was observed. [de-identified] : 3/15/23: b/l LE duplex\par IMPRESSION: There is acute below the knee DVT affecting the right soleal and the left soleal and gastrocnemius veins.

## 2023-07-10 NOTE — ED PROVIDER NOTE - NS_BEDUNITTYPES_ED_ALL_ED
EMERGENCY DEPARTMENT ENCOUNTER    Room Number:  JESSE/JESSE  Date seen:  12/9/2021  PCP: Provider, No Known  Historian: Patient, EMS      HPI:  Chief Complaint: Short of air, Covid  A complete HPI/ROS/PMH/PSH/SH/FH are unobtainable due to: Dementia  Context: Carolina Bhat is a 60 y.o. female who presents to the ED c/o shortness of air.  EMS reports that patient was reportedly short of breath with oxygen saturation in the 70s at nursing facility.  When they arrived patient was in the low 90s on room air.  They placed her on nasal cannula prior to arrival.  Patient has reportedly tested positive for COVID-19.  She also has a history of ESRD on hemodialysis and had reportedly missed dialysis yesterday.  Patient is a poor historian secondary to dementia, dysarthria with prior stroke.  Patient is able to state that she has wounds on her back.  She was also able to confirm that she was positive for COVID-19 but she is unsure when (she initially stated she tested positive in 2017).            PAST MEDICAL HISTORY  Active Ambulatory Problems     Diagnosis Date Noted   • DM (diabetes mellitus) type II controlled with renal manifestation ([facility])    • Anemia of chronic renal failure, stage 3 (moderate) ([facility]) 08/07/2016   • Diabetic polyneuropathy associated with type 2 diabetes mellitus ([facility]) 08/07/2016   • ESRD on hemodialysis ([facility]) 08/07/2016   • HLD (hyperlipidemia)    • Altered mental status 12/26/2018   • Immobility 12/26/2018   • Morbid obesity ([facility]) 12/26/2018   • Sacral ulcer ([facility]) 12/26/2018   • Infected decubitus ulcer 12/29/2018   • Metabolic encephalopathy 01/21/2019   • Blood bacterial culture positive 01/23/2019   • Seizure disorder ([facility]) 01/23/2019   • Complicated UTI (urinary tract infection) 02/13/2019     Resolved Ambulatory Problems     Diagnosis Date Noted   • Confusion 01/21/2019   • Hypotension 01/23/2019     Past Medical History:   Diagnosis Date   • Acid reflux    • Anemia    • Anxiety    • Bacteremia     • Chest pain    • Chronic constipation    • Chronic pain    • Coronary artery disease    • CVA (cerebral vascular accident) ([facility]) 2011, 2012   • Dementia ([facility])    • Depression    • Dyslipidemia    • Dysphagia    • Epilepsy ([facility])    • ESRD (end stage renal disease) ([facility]) 2016   • Flexion contractures    • Hard to intubate    • History of esophagitis    • Hypertension    • Mild nonproliferative diabetic retinopathy of left eye with macular edema ([facility]) 08/09/2017   • Myocardial infarction ([facility])    • Nausea & vomiting    • Open angle glaucoma with borderline intraocular pressure, bilateral 08/09/2017   • Pseudobulbar affect    • Venous insufficiency (chronic) (peripheral)          PAST SURGICAL HISTORY  Past Surgical History:   Procedure Laterality Date   • ARTERIOVENOUS FISTULA/SHUNT SURGERY Right 3/21/2016    Procedure: RT BRACHIAL CEPHALIC FISTULA;  Surgeon: Alla Price Jr., MD;  Location: [facility];  Service:    • BREAST BIOPSY           FAMILY HISTORY  History reviewed. No pertinent family history.      SOCIAL HISTORY  Social History     Socioeconomic History   • Marital status:    Tobacco Use   • Smoking status: Former Smoker   • Smokeless tobacco: Former User   Substance and Sexual Activity   • Alcohol use: No   • Drug use: No   • Sexual activity: Defer         ALLERGIES  Darvon [propoxyphene]        REVIEW OF SYSTEMS  Review of Systems   Unable to complete review of systems due to patient's dementia      PHYSICAL EXAM  ED Triage Vitals   Temp Heart Rate Resp BP SpO2   12/09/21 1613 12/09/21 1613 12/09/21 1613 12/09/21 1613 12/09/21 1613   100 °F (37.8 °C) 99 26 106/62 96 %      Temp src Heart Rate Source Patient Position BP Location FiO2 (%)   -- 12/09/21 1735 -- -- --    Monitor            GENERAL: Awake and alert, chronically ill-appearing, no acute distress  HENT: nares patent  EYES: no scleral icterus, pupils 3 mm reactive bilaterally  CV: regular rhythm, mildly tachycardic, dialysis  fistula right upper extremity with palpable thrill  RESPIRATORY: normal effort, lungs clear bilaterally without wheezing or crackles  ABDOMEN: soft, nondistended, nontender throughout  MUSCULOSKELETAL: no deformity, contracture of the left hand  NEURO: alert, very dysarthric speech that is near incomprehensible, patient does not consistently follow commands, baseline left-sided hemiparesis  SKIN: warm, dry, large sacral wound, see photo in chart    Vital signs and nursing notes reviewed.          LAB RESULTS  Recent Results (from the past 24 hour(s))   BNP    Collection Time: 12/09/21  4:46 PM    Specimen: Blood   Result Value Ref Range    proBNP 11,613.0 (H) 0.0 - 900.0 pg/mL   Troponin    Collection Time: 12/09/21  4:46 PM    Specimen: Blood   Result Value Ref Range    Troponin T 0.464 (C) 0.000 - 0.030 ng/mL   Procalcitonin    Collection Time: 12/09/21  4:46 PM    Specimen: Blood   Result Value Ref Range    Procalcitonin 6.38 (H) 0.00 - 0.25 ng/mL   CBC Auto Differential    Collection Time: 12/09/21  4:46 PM    Specimen: Blood   Result Value Ref Range    WBC 6.10 3.40 - 10.80 10*3/mm3    RBC 3.38 (L) 3.77 - 5.28 10*6/mm3    Hemoglobin 10.3 (L) 12.0 - 15.9 g/dL    Hematocrit 32.0 (L) 34.0 - 46.6 %    MCV 94.7 79.0 - 97.0 fL    MCH 30.5 26.6 - 33.0 pg    MCHC 32.2 31.5 - 35.7 g/dL    RDW 14.7 12.3 - 15.4 %    RDW-SD 50.3 37.0 - 54.0 fl    MPV 10.5 6.0 - 12.0 fL    Platelets 336 140 - 450 10*3/mm3   Manual Differential    Collection Time: 12/09/21  4:46 PM    Specimen: Blood   Result Value Ref Range    Neutrophil % 85.9 (H) 42.7 - 76.0 %    Lymphocyte % 10.1 (L) 19.6 - 45.3 %    Monocyte % 3.0 (L) 5.0 - 12.0 %    Atypical Lymphocyte % 1.0 0.0 - 5.0 %    Neutrophils Absolute 5.24 1.70 - 7.00 10*3/mm3    Lymphocytes Absolute 0.68 (L) 0.70 - 3.10 10*3/mm3    Monocytes Absolute 0.18 0.10 - 0.90 10*3/mm3    Anisocytosis Mod/2+ None Seen    Macrocytes Mod/2+ None Seen    WBC Morphology Normal Normal    Platelet Morphology  Normal Normal   Hemoglobin A1c    Collection Time: 12/09/21  4:46 PM    Specimen: Blood   Result Value Ref Range    Hemoglobin A1C 5.70 (H) 4.80 - 5.60 %   Comprehensive Metabolic Panel    Collection Time: 12/09/21  6:02 PM    Specimen: Blood   Result Value Ref Range    Glucose 165 (H) 65 - 99 mg/dL    BUN 50 (H) 8 - 23 mg/dL    Creatinine 5.79 (H) 0.57 - 1.00 mg/dL    Sodium 137 136 - 145 mmol/L    Potassium 5.0 3.5 - 5.2 mmol/L    Chloride 97 (L) 98 - 107 mmol/L    CO2 24.9 22.0 - 29.0 mmol/L    Calcium 9.4 8.6 - 10.5 mg/dL    Total Protein 8.3 6.0 - 8.5 g/dL    Albumin 2.90 (L) 3.50 - 5.20 g/dL    ALT (SGPT) 31 1 - 33 U/L    AST (SGOT) 75 (H) 1 - 32 U/L    Alkaline Phosphatase 80 39 - 117 U/L    Total Bilirubin 0.4 0.0 - 1.2 mg/dL    eGFR Non  Amer      eGFR  African Amer 9 (L) >60 mL/min/1.73    Globulin 5.4 gm/dL    A/G Ratio 0.5 g/dL    BUN/Creatinine Ratio 8.6 7.0 - 25.0    Anion Gap 15.1 (H) 5.0 - 15.0 mmol/L   Lipase    Collection Time: 12/09/21  6:02 PM    Specimen: Blood   Result Value Ref Range    Lipase 29 13 - 60 U/L   C-reactive Protein    Collection Time: 12/09/21  6:02 PM    Specimen: Blood   Result Value Ref Range    C-Reactive Protein 44.52 (H) 0.00 - 0.50 mg/dL   Lactic Acid, Plasma    Collection Time: 12/09/21  6:02 PM    Specimen: Blood   Result Value Ref Range    Lactate 1.3 0.5 - 2.0 mmol/L   Respiratory Panel PCR w/COVID-19(SARS-CoV-2) CHAYITO/JESSIE/HIMANSHU/PAD/COR/MAD/KEMAR In-House, NP Swab in [facility], 3-4 HR TAT - Swab, Nasopharynx    Collection Time: 12/09/21  6:04 PM    Specimen: Nasopharynx; Swab   Result Value Ref Range    ADENOVIRUS, PCR Not Detected Not Detected    Coronavirus 229E Not Detected Not Detected    Coronavirus HKU1 Not Detected Not Detected    Coronavirus NL63 Not Detected Not Detected    Coronavirus OC43 Not Detected Not Detected    COVID19 Detected (C) Not Detected - Ref. Range    Human Metapneumovirus Not Detected Not Detected    Human Rhinovirus/Enterovirus Not Detected  Not Detected    Influenza A PCR Not Detected Not Detected    Influenza B PCR Not Detected Not Detected    Parainfluenza Virus 1 Not Detected Not Detected    Parainfluenza Virus 2 Not Detected Not Detected    Parainfluenza Virus 3 Not Detected Not Detected    Parainfluenza Virus 4 Not Detected Not Detected    RSV, PCR Not Detected Not Detected    Bordetella pertussis pcr Not Detected Not Detected    Bordetella parapertussis PCR Not Detected Not Detected    Chlamydophila pneumoniae PCR Not Detected Not Detected    Mycoplasma pneumo by PCR Not Detected Not Detected       Ordered the above labs and reviewed the results.        RADIOLOGY  XR Chest 1 View    Result Date: 12/9/2021  EXAMINATION: SINGLE VIEW CHEST RADIOGRAPH  HISTORY: 60-year-old female with a history of Covid and shortness of air.  FINDINGS: An AP portable chest radiograph was obtained. Comparison is made to a chest radiograph dated 02/17/2021. There is hazy opacification seen throughout both lungs with interstitial opacification in both lungs, left worse than right. The cardiac silhouette is mildly enlarged. The visualized osseous structures appear normal.      There is evidence of bilateral interstitial and ground-glass infiltrates consistent with bilateral pneumonia and/or pulmonary edema. Mild cardiomegaly is noted.  This report was finalized on 12/9/2021 9:04 PM by Dr. Nacho Chong M.D.        Ordered the above noted radiological studies. Reviewed by me in PACS.            PROCEDURES  Critical Care  Performed by: Fredo Keith MD  Authorized by: Fredo Keith MD     Critical care provider statement:     Critical care time (minutes):  45    Critical care time was exclusive of:  Separately billable procedures and treating other patients    Critical care was necessary to treat or prevent imminent or life-threatening deterioration of the following conditions:  Cardiac failure, renal failure and sepsis    Critical care was time spent  personally by me on the following activities:  Ordering and review of laboratory studies, ordering and review of radiographic studies, pulse oximetry, re-evaluation of patient's condition, examination of patient, evaluation of patient's response to treatment, obtaining history from patient or surrogate, ordering and performing treatments and interventions and development of treatment plan with patient or surrogate                  MEDICATIONS GIVEN IN ER  Medications   sodium chloride 0.9 % flush 10 mL (has no administration in time range)   acetaminophen (TYLENOL) tablet 500 mg (has no administration in time range)   atorvastatin (LIPITOR) tablet 60 mg (has no administration in time range)   carvedilol (COREG) tablet 6.25 mg (has no administration in time range)   HYDROcodone-acetaminophen (NORCO) 7.5-325 MG per tablet 1 tablet (has no administration in time range)   levETIRAcetam in NaCl 0.82% (KEPPRA) IVPB 500 mg (has no administration in time range)   sevelamer (RENVELA) tablet 800 mg (has no administration in time range)   ondansetron (ZOFRAN) injection 4 mg (has no administration in time range)   famotidine (PEPCID) injection 20 mg (has no administration in time range)   dextrose (GLUTOSE) oral gel 15 g (has no administration in time range)   dextrose (D50W) (25 g/50 mL) IV injection 25 g (has no administration in time range)   glucagon (human recombinant) (GLUCAGEN DIAGNOSTIC) injection 1 mg (has no administration in time range)   insulin lispro (ADMELOG) injection 0-7 Units (has no administration in time range)   dexamethasone (DECADRON) injection 6 mg (has no administration in time range)   heparin (porcine) 5000 UNIT/ML injection 5,000 Units (has no administration in time range)   dexamethasone (DECADRON) injection 6 mg (6 mg Intravenous Given 12/9/21 1920)   adenosine (ADENOCARD) injection 12 mg (12 mg Intravenous Given 12/9/21 1950)                   MEDICAL DECISION MAKING, PROGRESS, and CONSULTS    All  labs have been independently reviewed by me.  All radiology studies have been reviewed by me and discussed with radiologist dictating the report.   EKG's independently viewed and interpreted by me.  Discussion below represents my analysis of pertinent findings related to patient's condition, differential diagnosis, treatment plan and final disposition.      Differential diagnosis includes but is not limited to:  Covid pneumonia  Congestive heart failure  Volume overload  Acute coronary syndrome  SVT      ED Course as of 12/09/21 2254   Thu Dec 09, 2021   1720 Chest x-ray independently interpreted PACs.  There are patchy infiltrates present throughout both lung fields consistent with COVID-19 pneumonia or less likely congestive heart failure. [JR]   1856 Discussed with Dr. Mary, patient's PCP, who agrees to admit. [JR]   1955 Patient developed tachycardia with rate 174.  EKG obtained and demonstrates SVT.  Patient was cardioverted with adenosine 12 mg IV push.  She tolerated this well.  She did complain of some chest discomfort.  We will continue to monitor. [JR]   2059 EKG          EKG time: 1934  Rhythm/Rate: SVT, rate 171  P waves and WV: N/A  QRS, axis: Normal axis  ST and T waves: Inferior T wave inversions, anterior ST depressions    Interpreted Contemporaneously by me, independently viewed  Similar compared to prior earlier today however SVT is new, rate is faster       [JR]   2100 EKG          EKG time: 1955  Rhythm/Rate: Sinus tach, 111  P waves and WV: Normal  QRS, axis: Normal axis  ST and T waves: Minimal anterior ST depression    Interpreted Contemporaneously by me, independently viewed  Similar compared to prior earlier today, SVT has resolved       [JR]      ED Course User Index  [JR] Fredo Keith MD              I wore an N95 mask, face shield, and gloves during this patient encounter.  Patient also wearing a surgical mask.  Hand hygeine performed before and after seeing the  patient.    DIAGNOSIS  Final diagnoses:   Pneumonia due to COVID-19 virus   ESRD on hemodialysis (HCC)   Immobility syndrome   Hypoxia   SVT (supraventricular tachycardia) ([facility])         DISPOSITION  ADMIT            Latest Documented Vital Signs:  As of 22:54 EST  BP- 117/75 HR- 103 Temp- 100 °F (37.8 °C) O2 sat- 94%        --    Please note that portions of this were completed with a voice recognition program.          Fredo Keith MD  12/09/21 [id number]     MEDICINE

## 2023-07-10 NOTE — CONSULT NOTE ADULT - PROBLEM SELECTOR RECOMMENDATION 3
-Obtain CBC, CMP and cultures  - Unclear reason as to why he was initiated on IV abt  - Would consult Transplant ID Dr. Lujan - WBC 15; Would obtain Bld Cx, urine Cx and viral panel   - Would consider non-con CT C/A/P  - Please consult Transplant ID Dr. Lujan for further recs

## 2023-07-10 NOTE — H&P ADULT - PROBLEM SELECTOR PLAN 2
-appreciate transplant team recs  - c/w tacro 2mg BID;   -daily Tacro level; goal tacro level 4-6,   -transplant SW team follow up to establish plan for Safe discharge when ready as he WILL NOT be Safe to going back to prior rehab facility  -TTE  -CT a/p ? -appreciate transplant team recs  - c/w tacro 2mg BID;   -daily Tacro level; goal tacro level 4-6,   -transplant SW team follow up to establish plan for Safe discharge when ready as he WILL NOT be Safe to going back to prior rehab facility  -TTE  -consider non con CT a/p -appreciate transplant team recs  - c/w tacro 2mg BID; *** reportedly at home takes 3 mg BID ********  -daily Tacro level; goal tacro level 4-6,   -transplant SW team follow up to establish plan for Safe discharge when ready as he WILL NOT be Safe to going back to prior rehab facility  -TTE  -consider non con CT a/p

## 2023-07-10 NOTE — H&P ADULT - NSHPSOCIALHISTORY_GEN_ALL_CORE
Lives at home with brother. Pt denied illicit drug or alcohol use. Used to chew tobacco occasionally when he was a child living in [place]. His wife passed away years ago. Lives at home with god brother Salty. Pt denied illicit drug or alcohol use. Used to chew tobacco occasionally when he was a child living in [place]. His wife passed away years ago. No children

## 2023-07-10 NOTE — ED PROVIDER NOTE - ATTENDING CONTRIBUTION TO CARE
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 Campbell for concern of infection of unknown etiology and need for admission. Patient had a recent admission for hypoglycemia, significant NORMAN (Cr 3.19, FeUrea 30.1%) and found to have supra-therapeutic tacro levels. Transplant cardiology and cardiorenal were consulted to assist with his care. from 6/14 - 6/23 and was discharged to a rehab facility. Per patient, he saw Dr. Campbell today and was told to come to the hospital for admission.     Dr. Kendrick called Dr. Campbell Endorse that patient has been receiving IV meropenem without known source.  Patient's physician has been trying to get in contact with the rehab facility but there has been no communication.  Dr. Campbell would like patient to be admitted for further evaluation to rule out infection in this immunocompromised patient.  Patient states that he feels okay.  He denies any fevers, chills.  He has no chest pain or shortness of breath.  He denies any nausea, vomiting, diarrhea.  He denies any urinary symptoms.    VS noted  Gen. no acute distress, Non toxic   HEENT: EOMI, mmm  Lungs: CTAB/L no C/ W /R   CVS: machine like sound   Abd; Soft non tender, non distended   Ext: minimal edema  Skin: no rash  Neuro AAOx3 non focal clear speech  a/p: immunocompromised on IV meropenem - sent in for admission for concern of infection of unknown etiology. Plan for labs, cultures, admission.   - Srinivas GARCIA

## 2023-07-10 NOTE — CONSULT NOTE ADULT - ASSESSMENT
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 His course was complicated by bilateral IJ/subclavian thrombi, a persistent small right sided pleural effusion, as well as acute on chronic renal failure of unclear etiology. Who presents to ED from HF clinic for ?infection on IV ABT as an outpt. Requiring evaluation given transplant Hx with labs.  Unclear etiology as patient appears well but does not have knowledge of any infections. Leukocytosis present on admission    Would:  Will try to contact Rehab. facility for further information  Send blood cultures x2 sets  UA is ok-  RVP negative  CXR RLL atelectasis  Obtain CT scan of chest/abdomen  check ESR and CRP  send quantiferon - as patient with PPD in his right forearm  Complete a week of IV meropenem and then discontinue    Gary Lujan MD  Can be called via Teams  After 5pm/weekends [phone]

## 2023-07-10 NOTE — CONSULT NOTE ADULT - PROBLEM SELECTOR RECOMMENDATION 3
- WBC 15; Would obtain Bld Cx, urine Cx and viral panel   - Would consider non-con CT C/A/P  - Please consult Transplant ID Dr. Lujan for further recs

## 2023-07-10 NOTE — ED ADULT NURSE NOTE - CHIEF COMPLAINT QUOTE
was at [facility] for rehab s/p recurrent hypoglycemia/NORMAN- patient came to [facility] today for appointment-sent for readmission for new placement as patient does not like the rehab. Pt denies chest pain, sob.

## 2023-07-10 NOTE — PHYSICAL EXAM
[Well Developed] : well developed [Well Nourished] : well nourished [Normal] : normal S1, S2, no murmur, no rub, no gallop [Normal S1, S2] : normal S1, S2 [Soft] : abdomen soft [Non Tender] : non-tender [de-identified] : + jvd [de-identified] : +III/VI diastolic murmur, tachycardic [de-identified] : 3+ b/l LE edema

## 2023-07-10 NOTE — CONSULT NOTE ADULT - NS ATTEND AMEND GEN_ALL_CORE FT
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  Patient does not have any localizing symptoms but does have an elevated WBC of 15  Pan culture and consult transplant ID  C/w tacrolimus for a goal of 4-6  Patient will need social work involvement to help with a safe discharge plan, cannot go back to the rehab facility which he currently resides in.

## 2023-07-10 NOTE — ED PROVIDER NOTE - PROGRESS NOTE DETAILS
Suture Removal/Wound Check HPI





- History of Present Illness


Chief Complaint: Suture/Staple Removal (other)


Stated Complaint: SUTURE REMOVAL


Time Seen by Provider: 07/04/20 08:28


History Source: Yes: Patient


Exam Limitations: Yes: No Limitations


Treated at: Other ED


Date of Last ED visit: 06/27/20





- Previous ED Treatment


Type of procedure performed on last visit: Yes: Laceration Repair





- Onset of Previous Treatment


Comment:: 





25 yo M presents for suture removal to R index finger. He states he injured it 

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 Sutures were placed in another ED 1 week ago 

and he was told to f/u for removal. Denies pain, drainage, redness, swelling. 





Past History





- Medical History


Allergies/Adverse Reactions: 


                                    Allergies











Allergy/AdvReac Type Severity Reaction Status Date / Time


 


No Known Allergies Allergy   Verified 07/04/20 08:07











Home Medications: 


Ambulatory Orders





NK [No Known Home Medication]  07/04/20 











- Psycho-Social/Smoking History


Smoking History: Never smoked





- Substance Abuse Hx (Audit-C & DAST Scrn)


How often the patient has a drink containing alcohol: Never


Score: In Men: 4 or > Positive; In Women: 3 or > Positive: 0


Screen Result (Pos requires Nsg. Audit-10AR): Negative





*Review of Systems





- Review of Systems


Able to Perform ROS?: Yes





GENERAL/CONSTITUTIONAL: No fever or chills. No weakness.


EXTREMITIES: R index finger with sutures in place, no drainage, no redness


SKIN: No rash.











*Physical Exam





- Vital Signs


                                Last Vital Signs











Temp Pulse Resp BP Pulse Ox


 


 98.1 F   56 L  14   104/60   100 


 


 07/04/20 08:07  07/04/20 08:07  07/04/20 08:07  07/04/20 08:07  07/04/20 08:07














- Physical Exam





GENERAL: Awake, alert, and fully oriented, in no acute distress


EXTREMITIES: R index finger with sutures in place, no surrounding cellulitis. 

Wound closed. Normal range of motion, no edema.  No clubbing or cyanosis. No 

cords, erythema, or tenderness











Medical Decision Making





- Medical Decision Making





Sutures removed using forceps and #11 blade. Patient tolerated well. Steri-

strips placed. Stable for DC home. 








Discharge





- Discharge Information


Problems reviewed: Yes


Clinical Impression/Diagnosis: 


 Encounter for removal of sutures





Condition: Stable


Disposition: HOME





- Follow up/Referral





- Patient Discharge Instructions


Patient Printed Discharge Instructions:  DI for Suture Removal





- Post Discharge Activity


Work/Back to School Note:  Back to Work Luh Kendrick MD (PGY-2 EM): collateral provided by Kim's office. patient and kim unclear why on IV abx, patient needs admission for workup of unknown etiology requiring abx and for social work placement.

## 2023-07-10 NOTE — CONSULT NOTE ADULT - ASSESSMENT
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 His course was complicated by bilateral IJ/subclavian thrombi, a persistent small right sided pleural effusion, as well as acute on chronic renal failure of unclear etiology. Who presents to ED from HF clinic for ?infection on IV ABT as an outpt. Requiring evaluation given transplant Hx with labs.       Cardiac Studies  TTE: 6.16- LVED 4.6, LVEF 58, mild enlarged. TAPSE 1.0 trace TR.   TTE 3/3/23: LVIDd 3.6, EF 50-55%, concentric remodeling    LHC/RHC 2/28/22: Diagnostic Conclusions: mLAD to RV fistula with LM-pLAD

## 2023-07-10 NOTE — CONSULT NOTE ADULT - PROBLEM SELECTOR RECOMMENDATION 9
- c/w tacro 2mg BID;   - Please send daily Tacro level; goal tacro level 4-6,   remains off cellcept  heartcare normal 5/17, DSA pending   - Will have transplant SW team follow up to establish plan for Safe discharge when ready as he WILL NOT be Safe to going back to prior rehab facility  - Repeat TTE

## 2023-07-10 NOTE — H&P ADULT - HISTORY OF PRESENT ILLNESS
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 Per rehab records, pt was started on meropenem on 7/8. Pt stated that he feels fine and was feeling well at rehab. Although, he stated he did not like the rehab it was at and said it was "dirty." He denied fever, chills, weakness, neck pain, cough, wheezing, chest pain, abdominal pain, dysuria or urinary frequency.

## 2023-07-10 NOTE — ED ADULT TRIAGE NOTE - HEIGHT IN INCHES
Immunization chart prep completed.  Immunization records verified.  Wilfrid Sam due for All Vacinations Up To Date No Vaccinations Needed   7

## 2023-07-10 NOTE — H&P ADULT - NSHPPHYSICALEXAM_GEN_ALL_CORE
T(C): 36.2 (07-10-23 @ 16:45), Max: 37.2 (07-10-23 @ 11:04)  HR: 68 (07-10-23 @ 16:45) (68 - 106)  BP: 117/72 (07-10-23 @ 16:45) (113/79 - 122/80)  RR: 15 (07-10-23 @ 16:45) (15 - 18)  SpO2: 99% (07-10-23 @ 16:45) (98% - 100%)    PHYSICAL EXAM:  GENERAL: NAD, well-groomed, well-developed  HEAD:  Atraumatic, Normocephalic  EYES: EOMI, PERRLA, conjunctiva and sclera clear, no jaundice   ENMT: No tonsillar erythema, exudates, or enlargement; Moist mucous membranes  NECK: Supple, non tender, No JVD, Normal thyroid  HEART: Regular rate and rhythm; No murmurs, rubs, or gallops. S1/S2 present  RESPIRATORY: CTA B/L, No W/R/R  ABDOMEN: Soft, Nontender, Nondistended; Bowel sounds present. No hepatosplenomegally  NEUROLOGY: A&Ox3, nonfocal, moving all extremities,  EXTREMITIES:  2+ Peripheral Pulses, No clubbing, cyanosis, or edema. 5/5 muscle tone in UE/LE  SKIN: warm, dry, normal color, no rash or abnormal lesions T(C): 36.2 (07-10-23 @ 16:45), Max: 37.2 (07-10-23 @ 11:04)  HR: 68 (07-10-23 @ 16:45) (68 - 106)  BP: 117/72 (07-10-23 @ 16:45) (113/79 - 122/80)  RR: 15 (07-10-23 @ 16:45) (15 - 18)  SpO2: 99% (07-10-23 @ 16:45) (98% - 100%)    PHYSICAL EXAM:  GENERAL: NAD, well-groomed, well-developed  HEAD:  Atraumatic, Normocephalic  EYES: EOMI, PERRLA, conjunctiva and sclera clear, no jaundice   ENMT: No tonsillar erythema, exudates, or enlargement; Moist mucous membranes  NECK: Supple, non tender, No JVD, Normal thyroid  HEART: Regular rate and rhythm; No murmurs, rubs, or gallops. S1/S2 present  RESPIRATORY: CTA B/L, No W/R/R  ABDOMEN: Soft, Nontender, Nondistended; Bowel sounds present. No hepatosplenomegaly  NEUROLOGY: A&Ox3, nonfocal, moving all extremities. 5/5 strength upper extremities. 3/5 RLE, 4/5 LLE.  EXTREMITIES:  2+ Peripheral Pulses, No clubbing, cyanosis, or edema. 5/5 muscle tone in UE/LE  SKIN: Dry flaky skin of bilateral lower extremities. No rash or abnormal lesions

## 2023-07-10 NOTE — CONSULT NOTE ADULT - SUBJECTIVE AND OBJECTIVE BOX
INFECTIOUS DISEASES FOLLOW UP-- Sujey Lujan  [phone]    This is a follow up note for this  73yMale with  Anemia, admitted from SNF for unclear reasons when he was noted to be on Meropenem.    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  More recent admissions have been related to thrombocytopenia and an episode of viral pneumonia         ROS:  CONSTITUTIONAL:  No fever, good appetite  CARDIOVASCULAR:  No chest pain or palpitations  RESPIRATORY:  No dyspnea  GASTROINTESTINAL:  No nausea, vomiting, diarrhea, or abdominal pain  GENITOURINARY:  No dysuria  NEUROLOGIC:  No headache,     Allergies    No Known Allergies    Intolerances        ANTIBIOTICS/RELEVANT:  antimicrobials  meropenem  IVPB      meropenem  IVPB 500 milliGRAM(s) IV Intermittent once  nystatin    Suspension 794974 Unit(s) Oral three times a day  trimethoprim   80 mG/sulfamethoxazole 400 mG 1 Tablet(s) Oral <User Schedule>  valGANciclovir 450 milliGRAM(s) Oral <User Schedule>    immunologic:  tacrolimus 1 milliGRAM(s) Oral two times a day    OTHER:  acetaminophen     Tablet .. 650 milliGRAM(s) Oral every 6 hours PRN  apixaban 5 milliGRAM(s) Oral every 12 hours  aspirin  chewable 81 milliGRAM(s) Oral daily  atorvastatin 10 milliGRAM(s) Oral at bedtime  buMETAnide 1 milliGRAM(s) Oral daily  dextrose 5%. 1000 milliLiter(s) IV Continuous <Continuous>  dextrose 5%. 1000 milliLiter(s) IV Continuous <Continuous>  dextrose 50% Injectable 25 Gram(s) IV Push once  dextrose 50% Injectable 25 Gram(s) IV Push once  dextrose 50% Injectable 12.5 Gram(s) IV Push once  dextrose Oral Gel 15 Gram(s) Oral once PRN  glucagon  Injectable 1 milliGRAM(s) IntraMuscular once  insulin lispro (ADMELOG) corrective regimen sliding scale   SubCutaneous three times a day before meals  insulin lispro (ADMELOG) corrective regimen sliding scale   SubCutaneous at bedtime  iron sucrose Injectable 100 milliGRAM(s) IV Push <User Schedule>  latanoprost 0.005% Ophthalmic Solution 1 Drop(s) Both EYES at bedtime  melatonin 3 milliGRAM(s) Oral at bedtime PRN  metoprolol succinate  milliGRAM(s) Oral daily  pantoprazole    Tablet 40 milliGRAM(s) Oral before breakfast  predniSONE   Tablet 10 milliGRAM(s) Oral three times a day      Objective:  Vital Signs Last 24 Hrs  T(C): 36.5 (10 Jul 2023 19:29), Max: 37.2 (10 Jul 2023 11:04)  T(F): 97.7 (10 Jul 2023 19:29), Max: 98.9 (10 Jul 2023 11:04)  HR: 81 (10 Jul 2023 19:29) (68 - 106)  BP: 114/86 (10 Jul 2023 19:29) (113/79 - 122/80)  BP(mean): --  RR: 16 (10 Jul 2023 19:29) (15 - 18)  SpO2: 95% (10 Jul 2023 19:29) (95% - 100%)    Parameters below as of 10 Jul 2023 19:29  Patient On (Oxygen Delivery Method): room air        PHYSICAL EXAM:  Constitutional:no acute distress  Eyes:WALT, EOMI  Ear/Nose/Throat: no oral lesions, 	  Respiratory: clear BL  Cardiovascular: S1S2  Gastrointestinal:soft, (+) BS, no tenderness  Extremities:no e/e/c  No Lymphadenopathy  IV sites not inflammed.    LABS:                        9.9    15.20 )-----------( 274      ( 10 Jul 2023 14:11 )             34.5     07-10    140  |  104  |  30<H>  ----------------------------<  92  4.7   |  20<L>  |  1.17    Ca    9.2      10 Jul 2023 14:11    TPro  6.7  /  Alb  4.0  /  TBili  0.6  /  DBili  x   /  AST  20  /  ALT  11  /  AlkPhos  53  07-10      Urinalysis Basic - ( 10 Jul 2023 17:19 )    Color: Colorless / Appearance: Clear / S.008 / pH: x  Gluc: x / Ketone: Negative  / Bili: Negative / Urobili: Negative   Blood: x / Protein: Negative / Nitrite: Negative   Leuk Esterase: Negative / RBC: 0 /hpf / WBC 0 /HPF   Sq Epi: x / Non Sq Epi: x / Bacteria: Negative        MICROBIOLOGY:    pH Urine: 6.0 (07.10.23 @ 17:19) Urinalysis + Microscopic Examination (07.10.23 @ 17:19)    pH Urine: 6.0   Urine Appearance: Clear   Color: Colorless   Specific Gravity: 1.008   Protein, Urine: Negative   Glucose Qualitative, Urine: Negative   Ketone - Urine: Negative   Blood, Urine: Negative   Bilirubin: Negative   Urobilinogen: Negative   Leukocyte Esterase Concentration: Negative   Nitrite: Negative   White Blood Cell - Urine: 0 /HPF   Red Blood Cell - Urine: 0 /hpf   Bacteria: Negative   Hyaline Casts: 1 /lpf   Squamous Epithelial Cells: 0 /hpf    Respiratory Viral Panel with COVID-19 by MEHNAZ (07.10.23 @ 14:10)    Rapid RVP Result: [facility]   [facility]CoV-2: [facility]: This Respiratory Panel uses polymerase chain reaction (PCR) to detect for  adenovirus; coronavirus (HKU1, NL63, 229E, OC43); human metapneumovirus  (hMPV); human enterovirus/rhinovirus (Entero/RV); influenza A; influenza  A/H1; influenza A/H3; influenza A/H1-2009; influenza B; parainfluenza  viruses 1, 2, 3, 4; respiratory syncytial virus; Mycoplasma pneumoniae;  Chlamydophila pneumoniae; and SARS-CoV-2.                RECENT CULTURES:      RADIOLOGY & ADDITIONAL STUDIES:    < from: Xray Chest 2 Views PA/Lat (07.10.23 @ 16:27) >  IMPRESSION:  Right basilar linear atelectasis.    < end of copied text >

## 2023-07-10 NOTE — CONSULT NOTE ADULT - PROBLEM/RECOMMENDATION-2
Impression: S/P [id number]; Retinal Detachment: Scleral Buckle and Retinal Cryopexy; Intravitreal Injection of air OD - 1 Day. Encounter for surgical aftercare following surgery on a sense organ  Z48.810. Plan: reviewed drops - pt to  from pharmacy today. START Betimol BID OD - sample given in office. Advised to use lubricant cameron to improve comfort from exposed suture. F/u next week with Dr. Brandy Gordon as scheduled. Call if worsening pain or vision.  --Continue Ofloxacin 0.3% and Pred QID OD
DISPLAY PLAN FREE TEXT
DISPLAY PLAN FREE TEXT

## 2023-07-10 NOTE — CONSULT NOTE ADULT - PROBLEM SELECTOR RECOMMENDATION 2
- Currently on Prednisone 30mg QD  - Recommend Hematology consult; Follows with Dr. Rosario for hemolytic anemia; As an outpt discussed possibly slow wean of prednisone  - if he is Not infected would consider rituximab while admitted
- Currently on Prednisone 30mg QD  - Recommend Hematology consult; Follows with Dr. Rosario for hemolytic anemia; As an outpt discussed possibly slow wean of prednisone  - if he is Not infected would consider rituximab while admitted

## 2023-07-10 NOTE — ED ADULT NURSE REASSESSMENT NOTE - NS ED NURSE REASSESS COMMENT FT1
MD Kendrick unable to obtain ultrasound PIV. Patient refusing anymore sticks for blood at this time. MD Mckeon made aware, OK not to get second set of cultures.

## 2023-07-11 ENCOUNTER — APPOINTMENT (OUTPATIENT)
Dept: CV DIAGNOSITCS | Facility: HOSPITAL | Age: 73
End: 2023-07-11

## 2023-07-11 LAB
ALBUMIN SERPL ELPH-MCNC: 3.8 G/DL — SIGNIFICANT CHANGE UP (ref 3.3–5)
ALP SERPL-CCNC: 50 U/L — SIGNIFICANT CHANGE UP (ref 40–120)
ALT FLD-CCNC: 9 U/L — LOW (ref 10–45)
ANION GAP SERPL CALC-SCNC: 11 MMOL/L — SIGNIFICANT CHANGE UP (ref 5–17)
APTT BLD: 32.6 SEC — SIGNIFICANT CHANGE UP (ref 27.5–35.5)
AST SERPL-CCNC: 14 U/L — SIGNIFICANT CHANGE UP (ref 10–40)
BASOPHILS # BLD AUTO: 0.02 K/UL — SIGNIFICANT CHANGE UP (ref 0–0.2)
BASOPHILS NFR BLD AUTO: 0.1 % — SIGNIFICANT CHANGE UP (ref 0–2)
BILIRUB SERPL-MCNC: 0.7 MG/DL — SIGNIFICANT CHANGE UP (ref 0.2–1.2)
BUN SERPL-MCNC: 29 MG/DL — HIGH (ref 7–23)
CALCIUM SERPL-MCNC: 9 MG/DL — SIGNIFICANT CHANGE UP (ref 8.4–10.5)
CHLORIDE SERPL-SCNC: 101 MMOL/L — SIGNIFICANT CHANGE UP (ref 96–108)
CO2 SERPL-SCNC: 23 MMOL/L — SIGNIFICANT CHANGE UP (ref 22–31)
CREAT SERPL-MCNC: 1.11 MG/DL — SIGNIFICANT CHANGE UP (ref 0.5–1.3)
CRP SERPL-MCNC: 9 MG/L — HIGH (ref 0–4)
CULTURE RESULTS: SIGNIFICANT CHANGE UP
EGFR: 70 ML/MIN/1.73M2 — SIGNIFICANT CHANGE UP
EOSINOPHIL # BLD AUTO: 0 K/UL — SIGNIFICANT CHANGE UP (ref 0–0.5)
EOSINOPHIL NFR BLD AUTO: 0 % — SIGNIFICANT CHANGE UP (ref 0–6)
ERYTHROCYTE [SEDIMENTATION RATE] IN BLOOD: 60 MM/HR — HIGH (ref 0–20)
GLUCOSE BLDC GLUCOMTR-MCNC: 121 MG/DL — HIGH (ref 70–99)
GLUCOSE BLDC GLUCOMTR-MCNC: 132 MG/DL — HIGH (ref 70–99)
GLUCOSE BLDC GLUCOMTR-MCNC: 140 MG/DL — HIGH (ref 70–99)
GLUCOSE BLDC GLUCOMTR-MCNC: 183 MG/DL — HIGH (ref 70–99)
GLUCOSE BLDC GLUCOMTR-MCNC: 89 MG/DL — SIGNIFICANT CHANGE UP (ref 70–99)
GLUCOSE SERPL-MCNC: 148 MG/DL — HIGH (ref 70–99)
HCT VFR BLD CALC: 33.6 % — LOW (ref 39–50)
HGB BLD-MCNC: 10 G/DL — LOW (ref 13–17)
IMM GRANULOCYTES NFR BLD AUTO: 0.8 % — SIGNIFICANT CHANGE UP (ref 0–0.9)
INR BLD: 1.4 RATIO — HIGH (ref 0.88–1.16)
LYMPHOCYTES # BLD AUTO: 1.14 K/UL — SIGNIFICANT CHANGE UP (ref 1–3.3)
LYMPHOCYTES # BLD AUTO: 7.7 % — LOW (ref 13–44)
MCHC RBC-ENTMCNC: 29.2 PG — SIGNIFICANT CHANGE UP (ref 27–34)
MCHC RBC-ENTMCNC: 29.8 GM/DL — LOW (ref 32–36)
MCV RBC AUTO: 98.2 FL — SIGNIFICANT CHANGE UP (ref 80–100)
MONOCYTES # BLD AUTO: 0.48 K/UL — SIGNIFICANT CHANGE UP (ref 0–0.9)
MONOCYTES NFR BLD AUTO: 3.2 % — SIGNIFICANT CHANGE UP (ref 2–14)
NEUTROPHILS # BLD AUTO: 13.08 K/UL — HIGH (ref 1.8–7.4)
NEUTROPHILS NFR BLD AUTO: 88.2 % — HIGH (ref 43–77)
NRBC # BLD: 0 /100 WBCS — SIGNIFICANT CHANGE UP (ref 0–0)
PLATELET # BLD AUTO: 282 K/UL — SIGNIFICANT CHANGE UP (ref 150–400)
POTASSIUM SERPL-MCNC: 4.7 MMOL/L — SIGNIFICANT CHANGE UP (ref 3.5–5.3)
POTASSIUM SERPL-SCNC: 4.7 MMOL/L — SIGNIFICANT CHANGE UP (ref 3.5–5.3)
PROCALCITONIN SERPL-MCNC: 0.11 NG/ML — HIGH (ref 0.02–0.1)
PROT SERPL-MCNC: 6.4 G/DL — SIGNIFICANT CHANGE UP (ref 6–8.3)
PROTHROM AB SERPL-ACNC: 16.3 SEC — HIGH (ref 10.5–13.4)
RBC # BLD: 3.42 M/UL — LOW (ref 4.2–5.8)
RBC # FLD: 19.7 % — HIGH (ref 10.3–14.5)
SODIUM SERPL-SCNC: 135 MMOL/L — SIGNIFICANT CHANGE UP (ref 135–145)
SPECIMEN SOURCE: SIGNIFICANT CHANGE UP
WBC # BLD: 14.84 K/UL — HIGH (ref 3.8–10.5)
WBC # FLD AUTO: 14.84 K/UL — HIGH (ref 3.8–10.5)

## 2023-07-11 PROCEDURE — 71260 CT THORAX DX C+: CPT | Mod: 26

## 2023-07-11 PROCEDURE — 99222 1ST HOSP IP/OBS MODERATE 55: CPT

## 2023-07-11 PROCEDURE — 99233 SBSQ HOSP IP/OBS HIGH 50: CPT | Mod: GC

## 2023-07-11 PROCEDURE — 74177 CT ABD & PELVIS W/CONTRAST: CPT | Mod: 26

## 2023-07-11 PROCEDURE — 99232 SBSQ HOSP IP/OBS MODERATE 35: CPT

## 2023-07-11 RX ORDER — INSULIN LISPRO 100/ML
VIAL (ML) SUBCUTANEOUS
Refills: 0 | Status: DISCONTINUED | OUTPATIENT
Start: 2023-07-11 | End: 2023-07-18

## 2023-07-11 RX ORDER — TACROLIMUS 5 MG/1
3 CAPSULE ORAL
Refills: 0 | Status: DISCONTINUED | OUTPATIENT
Start: 2023-07-11 | End: 2023-07-13

## 2023-07-11 RX ORDER — INSULIN LISPRO 100/ML
VIAL (ML) SUBCUTANEOUS AT BEDTIME
Refills: 0 | Status: DISCONTINUED | OUTPATIENT
Start: 2023-07-11 | End: 2023-07-18

## 2023-07-11 RX ORDER — TACROLIMUS 5 MG/1
3 CAPSULE ORAL
Refills: 0 | Status: DISCONTINUED | OUTPATIENT
Start: 2023-07-11 | End: 2023-07-11

## 2023-07-11 RX ORDER — TACROLIMUS 5 MG/1
2 CAPSULE ORAL
Refills: 0 | Status: DISCONTINUED | OUTPATIENT
Start: 2023-07-11 | End: 2023-07-11

## 2023-07-11 RX ORDER — TACROLIMUS 5 MG/1
1 CAPSULE ORAL ONCE
Refills: 0 | Status: COMPLETED | OUTPATIENT
Start: 2023-07-11 | End: 2023-07-11

## 2023-07-11 RX ADMIN — APIXABAN 5 MILLIGRAM(S): 2.5 TABLET, FILM COATED ORAL at 05:42

## 2023-07-11 RX ADMIN — TACROLIMUS 3 MILLIGRAM(S): 5 CAPSULE ORAL at 19:46

## 2023-07-11 RX ADMIN — Medication 100 MILLIGRAM(S): at 05:43

## 2023-07-11 RX ADMIN — Medication 500000 UNIT(S): at 13:15

## 2023-07-11 RX ADMIN — ATORVASTATIN CALCIUM 10 MILLIGRAM(S): 80 TABLET, FILM COATED ORAL at 21:52

## 2023-07-11 RX ADMIN — Medication 10 MILLIGRAM(S): at 05:43

## 2023-07-11 RX ADMIN — Medication 10 MILLIGRAM(S): at 21:52

## 2023-07-11 RX ADMIN — BUMETANIDE 1 MILLIGRAM(S): 0.25 INJECTION INTRAMUSCULAR; INTRAVENOUS at 05:43

## 2023-07-11 RX ADMIN — MEROPENEM 100 MILLIGRAM(S): 1 INJECTION INTRAVENOUS at 21:52

## 2023-07-11 RX ADMIN — MEROPENEM 100 MILLIGRAM(S): 1 INJECTION INTRAVENOUS at 05:42

## 2023-07-11 RX ADMIN — APIXABAN 5 MILLIGRAM(S): 2.5 TABLET, FILM COATED ORAL at 19:46

## 2023-07-11 RX ADMIN — Medication 81 MILLIGRAM(S): at 13:14

## 2023-07-11 RX ADMIN — LATANOPROST 1 DROP(S): 0.05 SOLUTION/ DROPS OPHTHALMIC; TOPICAL at 21:52

## 2023-07-11 RX ADMIN — TACROLIMUS 1 MILLIGRAM(S): 5 CAPSULE ORAL at 19:46

## 2023-07-11 RX ADMIN — TACROLIMUS 1 MILLIGRAM(S): 5 CAPSULE ORAL at 05:43

## 2023-07-11 RX ADMIN — PANTOPRAZOLE SODIUM 40 MILLIGRAM(S): 20 TABLET, DELAYED RELEASE ORAL at 05:43

## 2023-07-11 RX ADMIN — Medication 10 MILLIGRAM(S): at 13:14

## 2023-07-11 RX ADMIN — Medication 500000 UNIT(S): at 21:53

## 2023-07-11 NOTE — PROGRESS NOTE ADULT - PROBLEM SELECTOR PLAN 2
- Currently on Prednisone 30mg QD  - Recommend Hematology consult; Follows with Dr. Rosario for hemolytic anemia; As an outpt discussed possibly slow wean of prednisone  - if he is Not infected would consider rituximab while admitted.

## 2023-07-11 NOTE — PROGRESS NOTE ADULT - PROBLEM SELECTOR PLAN 8
Diet: DASH with CC   Dispo: Pending  DVT: On Eliquis  Code: FULL Diet: DASH with CC   Dispo: Pending   DVT: On Eliquis  Code: FULL

## 2023-07-11 NOTE — CONSULT NOTE ADULT - SUBJECTIVE AND OBJECTIVE BOX
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    Per rehab records, pt was started on meropenem on 7/8. Pt stated that he feels fine and was feeling well at rehab. Although, he stated he did not like the rehab it was at and said it was "dirty." He denied fever, chills, weakness, neck pain, cough, wheezing, chest pain, abdominal pain, dysuria or urinary frequency.       ROS negative except as indicated in the HPI.    PAST MEDICAL & SURGICAL HISTORY:  HTN      SVT (Supraventricular Tachycardia)      Non-Ischemic Cardiomyopathy  now s/p transplant 2018      GIB (gastrointestinal bleeding)      Hepatitis C virus      DVT of upper extremity (deep vein thrombosis)      Former smoker      HLD (hyperlipidemia)      Knee pain, right      H/O autoimmune hemolytic anemia      H/O hemolytic anemia      Status post left hip replacement      H/O heart transplant  2/2018          SOCIAL HISTORY:   pt at rehab s/p recurrent hypoglycemia/[facility]. Ambulates w/ cane and walker. No etoh or tobacco.     FAMILY HISTORY:      MEDICATIONS  (STANDING):  apixaban 5 milliGRAM(s) Oral every 12 hours  aspirin  chewable 81 milliGRAM(s) Oral daily  atorvastatin 10 milliGRAM(s) Oral at bedtime  buMETAnide 1 milliGRAM(s) Oral daily  dextrose 5%. 1000 milliLiter(s) (100 mL/Hr) IV Continuous <Continuous>  dextrose 5%. 1000 milliLiter(s) (50 mL/Hr) IV Continuous <Continuous>  dextrose 50% Injectable 25 Gram(s) IV Push once  dextrose 50% Injectable 12.5 Gram(s) IV Push once  dextrose 50% Injectable 25 Gram(s) IV Push once  glucagon  Injectable 1 milliGRAM(s) IntraMuscular once  insulin lispro (ADMELOG) corrective regimen sliding scale   SubCutaneous three times a day before meals  insulin lispro (ADMELOG) corrective regimen sliding scale   SubCutaneous at bedtime  latanoprost 0.005% Ophthalmic Solution 1 Drop(s) Both EYES at bedtime  meropenem  IVPB      meropenem  IVPB 500 milliGRAM(s) IV Intermittent every 8 hours  metoprolol succinate  milliGRAM(s) Oral daily  nystatin    Suspension [id number] Unit(s) Oral three times a day  pantoprazole    Tablet 40 milliGRAM(s) Oral before breakfast  predniSONE   Tablet 10 milliGRAM(s) Oral three times a day  tacrolimus 2 milliGRAM(s) Oral two times a day  trimethoprim   80 mG/sulfamethoxazole 400 mG 1 Tablet(s) Oral <User Schedule>  valGANciclovir 450 milliGRAM(s) Oral <User Schedule>    MEDICATIONS  (PRN):  acetaminophen     Tablet .. 650 milliGRAM(s) Oral every 6 hours PRN Temp greater or equal to 38C (100.4F), Mild Pain (1 - 3)  dextrose Oral Gel 15 Gram(s) Oral once PRN Blood Glucose LESS THAN 70 milliGRAM(s)/deciliter  melatonin 3 milliGRAM(s) Oral at bedtime PRN Insomnia      Allergies    No Known Allergies    Intolerances        Vital Signs Last 24 Hrs  T(C): 36.5 (11 Jul 2023 09:50), Max: 37.2 (10 Jul 2023 11:04)  T(F): 97.7 (11 Jul 2023 09:50), Max: 98.9 (10 Jul 2023 11:04)  HR: 103 (11 Jul 2023 09:50) (68 - 109)  BP: 116/78 (11 Jul 2023 09:50) (113/79 - 123/83)  BP(mean): 96 (10 Jul 2023 22:12) (96 - 96)  RR: 18 (11 Jul 2023 09:50) (15 - 18)  SpO2: 98% (11 Jul 2023 09:50) (95% - 100%)    Parameters below as of 11 Jul 2023 09:50  Patient On (Oxygen Delivery Method): room air        PHYSICAL EXAM  General: adult in NAD  HEENT: clear oropharynx, anicteric sclera, pink conjunctiva  Neck: supple, no pain or stiffness  CV: normal S1/S2 with no murmur rubs or gallops  Lungs: positive air movement b/l ant lungs, clear to auscultation, no wheezes, no rales  Abdomen: soft non-tender non-distended, no hepatosplenomegaly  Ext: no clubbing cyanosis,  L middle finger deformity, 2+ LE edema  Skin: no rashes and no petechiae  Neuro: alert and oriented X 4, no focal deficits      LABS:                          9.9    15.20 )-----------( 274      ( 10 Jul 2023 14:11 )             34.5         Mean Cell Volume : 102.1 fl  Mean Cell Hemoglobin : 29.3 pg  Mean Cell Hemoglobin Concentration : 28.7 gm/dL  Auto Neutrophil # : 13.36 K/uL  Auto Lymphocyte # : 1.11 K/uL  Auto Monocyte # : 0.57 K/uL  Auto Eosinophil # : 0.02 K/uL  Auto Basophil # : 0.03 K/uL  Auto Neutrophil % : 87.9 %  Auto Lymphocyte % : 7.3 %  Auto Monocyte % : 3.8 %  Auto Eosinophil % : 0.1 %  Auto Basophil % : 0.2 %      07-10    140  |  104  |  30<H>  ----------------------------<  92  4.7   |  20<L>  |  1.17    Ca    9.2      10 Jul 2023 14:11    TPro  6.7  /  Alb  4.0  /  TBili  0.6  /  DBili  x   /  AST  20  /  ALT  11  /  AlkPhos  53  07-10                             HEMATOLOGY ONCOLOGY CONSULT     Patient is a 73y old  Male who presents with a chief complaint of sepsis w/u       HPI:  74 yo M w/ PMHx T2DM, OHT 2/2018 (on tacrolimus) with a LAD-RV fistula and hx of graft dysfunction with DSAs treated with PLEX/IVIG/rituximab, 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    Per rehab records, pt was started on meropenem on 7/8. Pt stated that he feels fine and was feeling well at rehab. Although, he stated he did not like the rehab it was at and said it was "dirty." He denied fever, chills, weakness, neck pain, cough, wheezing, chest pain, abdominal pain, dysuria or urinary frequency.       ROS negative except as indicated in the HPI.    PAST MEDICAL & SURGICAL HISTORY:  HTN      SVT (Supraventricular Tachycardia)      Non-Ischemic Cardiomyopathy  now s/p transplant 2018      GIB (gastrointestinal bleeding)      Hepatitis C virus      DVT of upper extremity (deep vein thrombosis)      Former smoker      HLD (hyperlipidemia)      Knee pain, right      H/O autoimmune hemolytic anemia      H/O hemolytic anemia      Status post left hip replacement      H/O heart transplant  2/2018          SOCIAL HISTORY:   pt at rehab s/p recurrent hypoglycemia/[facility]. Ambulates w/ cane and walker. No etoh or tobacco.     FAMILY HISTORY:      MEDICATIONS  (STANDING):  apixaban 5 milliGRAM(s) Oral every 12 hours  aspirin  chewable 81 milliGRAM(s) Oral daily  atorvastatin 10 milliGRAM(s) Oral at bedtime  buMETAnide 1 milliGRAM(s) Oral daily  dextrose 5%. 1000 milliLiter(s) (100 mL/Hr) IV Continuous <Continuous>  dextrose 5%. 1000 milliLiter(s) (50 mL/Hr) IV Continuous <Continuous>  dextrose 50% Injectable 25 Gram(s) IV Push once  dextrose 50% Injectable 12.5 Gram(s) IV Push once  dextrose 50% Injectable 25 Gram(s) IV Push once  glucagon  Injectable 1 milliGRAM(s) IntraMuscular once  insulin lispro (ADMELOG) corrective regimen sliding scale   SubCutaneous three times a day before meals  insulin lispro (ADMELOG) corrective regimen sliding scale   SubCutaneous at bedtime  latanoprost 0.005% Ophthalmic Solution 1 Drop(s) Both EYES at bedtime  meropenem  IVPB      meropenem  IVPB 500 milliGRAM(s) IV Intermittent every 8 hours  metoprolol succinate  milliGRAM(s) Oral daily  nystatin    Suspension [id number] Unit(s) Oral three times a day  pantoprazole    Tablet 40 milliGRAM(s) Oral before breakfast  predniSONE   Tablet 10 milliGRAM(s) Oral three times a day  tacrolimus 2 milliGRAM(s) Oral two times a day  trimethoprim   80 mG/sulfamethoxazole 400 mG 1 Tablet(s) Oral <User Schedule>  valGANciclovir 450 milliGRAM(s) Oral <User Schedule>    MEDICATIONS  (PRN):  acetaminophen     Tablet .. 650 milliGRAM(s) Oral every 6 hours PRN Temp greater or equal to 38C (100.4F), Mild Pain (1 - 3)  dextrose Oral Gel 15 Gram(s) Oral once PRN Blood Glucose LESS THAN 70 milliGRAM(s)/deciliter  melatonin 3 milliGRAM(s) Oral at bedtime PRN Insomnia      Allergies    No Known Allergies    Intolerances        Vital Signs Last 24 Hrs  T(C): 36.5 (11 Jul 2023 09:50), Max: 37.2 (10 Jul 2023 11:04)  T(F): 97.7 (11 Jul 2023 09:50), Max: 98.9 (10 Jul 2023 11:04)  HR: 103 (11 Jul 2023 09:50) (68 - 109)  BP: 116/78 (11 Jul 2023 09:50) (113/79 - 123/83)  BP(mean): 96 (10 Jul 2023 22:12) (96 - 96)  RR: 18 (11 Jul 2023 09:50) (15 - 18)  SpO2: 98% (11 Jul 2023 09:50) (95% - 100%)    Parameters below as of 11 Jul 2023 09:50  Patient On (Oxygen Delivery Method): room air        PHYSICAL EXAM  General: adult in NAD  HEENT: clear oropharynx, anicteric sclera, pink conjunctiva  Neck: supple, no pain or stiffness  CV: normal S1/S2 with no murmur rubs or gallops  Lungs: positive air movement b/l ant lungs, clear to auscultation, no wheezes, no rales  Abdomen: soft non-tender non-distended, no hepatosplenomegaly  Ext: no clubbing cyanosis,  R middle finger deformity, 2+ LE edema  Skin: no rashes and no petechiae  Neuro: alert and oriented X 4, no focal deficits      LABS:                          9.9    15.20 )-----------( 274      ( 10 Jul 2023 14:11 )             34.5         Mean Cell Volume : 102.1 fl  Mean Cell Hemoglobin : 29.3 pg  Mean Cell Hemoglobin Concentration : 28.7 gm/dL  Auto Neutrophil # : 13.36 K/uL  Auto Lymphocyte # : 1.11 K/uL  Auto Monocyte # : 0.57 K/uL  Auto Eosinophil # : 0.02 K/uL  Auto Basophil # : 0.03 K/uL  Auto Neutrophil % : 87.9 %  Auto Lymphocyte % : 7.3 %  Auto Monocyte % : 3.8 %  Auto Eosinophil % : 0.1 %  Auto Basophil % : 0.2 %      07-10    140  |  104  |  30<H>  ----------------------------<  92  4.7   |  20<L>  |  1.17    Ca    9.2      10 Jul 2023 14:11    TPro  6.7  /  Alb  4.0  /  TBili  0.6  /  DBili  x   /  AST  20  /  ALT  11  /  AlkPhos  53  07-10

## 2023-07-11 NOTE — PROGRESS NOTE ADULT - SUBJECTIVE AND OBJECTIVE BOX
BILLY RUSSELL  73y  Male    Patient is a 73y old  Male who presents with a chief complaint of sepsis w/u (10 Jul 2023 22:08)    INTERVAL HPI/OVERNIGHT EVENTS:  - ***    T(C): 36.3 (23 @ 05:40), Max: 37.2 (07-10-23 @ 11:04)  HR: 101 (23 @ 05:40) (68 - 109)  BP: 123/83 (23 @ 05:40) (113/79 - 123/83)  RR: 16 (23 @ 05:40) (15 - 18)  SpO2: 97% (23 @ 05:40) (95% - 100%)  Wt(kg): --Vital Signs Last 24 Hrs  T(C): 36.3 (2023 05:40), Max: 37.2 (10 Jul 2023 11:04)  T(F): 97.4 (2023 05:40), Max: 98.9 (10 Jul 2023 11:04)  HR: 101 (2023 05:40) (68 - 109)  BP: 123/83 (2023 05:40) (113/79 - 123/83)  BP(mean): 96 (10 Jul 2023 22:12) (96 - 96)  RR: 16 (2023 05:40) (15 - 18)  SpO2: 97% (2023 05:40) (95% - 100%)    Parameters below as of 2023 05:40  Patient On (Oxygen Delivery Method): room air    PHYSICAL EXAM:  GENERAL: NAD, well-groomed, well-developed  HEAD:  Atraumatic, Normocephalic  EYES: EOMI, PERRLA, conjunctiva and sclera clear, no jaundice   ENMT: No tonsillar erythema, exudates, or enlargement; Moist mucous membranes  NECK: Supple, non tender, No JVD, Normal thyroid  HEART: Regular rate and rhythm; No murmurs, rubs, or gallops. S1/S2 present  RESPIRATORY: CTA B/L, No W/R/R  ABDOMEN: Soft, Nontender, Nondistended; Bowel sounds present. No hepatosplenomegaly  NEUROLOGY: A&Ox3, nonfocal, moving all extremities. 5/5 strength upper extremities. 3/5 RLE, 4/5 LLE.  EXTREMITIES:  2+ Peripheral Pulses, No clubbing, cyanosis, or edema. 5/5 muscle tone in UE/LE  SKIN: Dry flaky skin of bilateral lower extremities. No rash or abnormal lesions *********    Consultant(s) Notes Reviewed:  [x ] YES  [ ] NO  Care Discussed with Consultants/Other Providers [ x] YES  [ ] NO    LABS:                        9.9    15.20 )-----------( 274      ( 10 Jul 2023 14:11 )             34.5     07-10    140  |  104  |  30<H>  ----------------------------<  92  4.7   |  20<L>  |  1.17    Ca    9.2      10 Jul 2023 14:11    TPro  6.7  /  Alb  4.0  /  TBili  0.6  /  DBili  x   /  AST  20  /  ALT  11  /  AlkPhos  53  07-10        Urinalysis Basic - ( 10 Jul 2023 17:19 )    Color: Colorless / Appearance: Clear / S.008 / pH: x  Gluc: x / Ketone: Negative  / Bili: Negative / Urobili: Negative   Blood: x / Protein: Negative / Nitrite: Negative   Leuk Esterase: Negative / RBC: 0 /hpf / WBC 0 /HPF   Sq Epi: x / Non Sq Epi: x / Bacteria: Negative      CAPILLARY BLOOD GLUCOSE      POCT Blood Glucose.: 101 mg/dL (10 Jul 2023 13:21)        Urinalysis Basic - ( 10 Jul 2023 17:19 )    Color: Colorless / Appearance: Clear / S.008 / pH: x  Gluc: x / Ketone: Negative  / Bili: Negative / Urobili: Negative   Blood: x / Protein: Negative / Nitrite: Negative   Leuk Esterase: Negative / RBC: 0 /hpf / WBC 0 /HPF   Sq Epi: x / Non Sq Epi: x / Bacteria: Negative        RADIOLOGY & ADDITIONAL TESTS:    Imaging Personally Reviewed:  [ ] YES  [ ] NO    HEALTH ISSUES - PROBLEM Dx:  Transplanted heart    Nicole syndrome    Receiving intravenous antibiotic treatment at home    Chronic atrial fibrillation    Hypertension    Chronic kidney disease (CKD)    Encounter for deep vein thrombosis (DVT) prophylaxis    Type 2 diabetes mellitus    Systemic inflammatory response syndrome (SIRS)         YVONNE BILLY  73y  Male    Patient is a 73y old  Male who presents with a chief complaint of sepsis w/u (10 Jul 2023 22:08)    74 yo M w/ PMHx T2DM, OHT 2018 (on tacrolimus) with a LAD-RV fistula and hx of graft dysfunction with DSAs treated with PLEX/IVIG/rituximab, 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 Per rehab records, pt was started on meropenem on . Pt stated that he feels fine and was feeling well at rehab. Although, he stated he did not like the rehab it was at and said it was "dirty." He denied fever, chills, weakness, neck pain, cough, wheezing, chest pain, abdominal pain, dysuria or urinary frequency.     INTERVAL HPI/OVERNIGHT EVENTS:  - ***    T(C): 36.3 (23 @ 05:40), Max: 37.2 (07-10-23 @ 11:04)  HR: 101 (23 @ 05:40) (68 - 109)  BP: 123/83 (23 @ 05:40) (113/79 - 123/83)  RR: 16 (23 @ 05:40) (15 - 18)  SpO2: 97% (23 @ 05:40) (95% - 100%)  Wt(kg): --Vital Signs Last 24 Hrs  T(C): 36.3 (2023 05:40), Max: 37.2 (10 Jul 2023 11:04)  T(F): 97.4 (2023 05:40), Max: 98.9 (10 Jul 2023 11:04)  HR: 101 (2023 05:40) (68 - 109)  BP: 123/83 (2023 05:40) (113/79 - 123/83)  BP(mean): 96 (10 Jul 2023 22:12) (96 - 96)  RR: 16 (2023 05:40) (15 - 18)  SpO2: 97% (2023 05:40) (95% - 100%)    Parameters below as of 2023 05:40  Patient On (Oxygen Delivery Method): room air    PHYSICAL EXAM:  GENERAL: NAD, well-groomed, well-developed  HEAD:  Atraumatic, Normocephalic  EYES: EOMI, PERRLA, conjunctiva and sclera clear, no jaundice   ENMT: No tonsillar erythema, exudates, or enlargement; Moist mucous membranes  NECK: Supple, non tender, No JVD, Normal thyroid  HEART: Regular rate and rhythm; No murmurs, rubs, or gallops. S1/S2 present  RESPIRATORY: CTA B/L, No W/R/R  ABDOMEN: Soft, Nontender, Nondistended; Bowel sounds present. No hepatosplenomegaly  NEUROLOGY: A&Ox3, nonfocal, moving all extremities. 5/5 strength upper extremities. 3/5 RLE, 4/5 LLE.  EXTREMITIES:  2+ Peripheral Pulses, No clubbing, cyanosis, or edema. 5/5 muscle tone in UE/LE  SKIN: Dry flaky skin of bilateral lower extremities. No rash or abnormal lesions *********    Consultant(s) Notes Reviewed:  [x ] YES  [ ] NO  Care Discussed with Consultants/Other Providers [ x] YES  [ ] NO    LABS:                        9.9    15.20 )-----------( 274      ( 10 Jul 2023 14:11 )             34.5     07-10    140  |  104  |  30<H>  ----------------------------<  92  4.7   |  20<L>  |  1.17    Ca    9.2      10 Jul 2023 14:11    TPro  6.7  /  Alb  4.0  /  TBili  0.6  /  DBili  x   /  AST  20  /  ALT  11  /  AlkPhos  53  07-10        Urinalysis Basic - ( 10 Jul 2023 17:19 )    Color: Colorless / Appearance: Clear / S.008 / pH: x  Gluc: x / Ketone: Negative  / Bili: Negative / Urobili: Negative   Blood: x / Protein: Negative / Nitrite: Negative   Leuk Esterase: Negative / RBC: 0 /hpf / WBC 0 /HPF   Sq Epi: x / Non Sq Epi: x / Bacteria: Negative      CAPILLARY BLOOD GLUCOSE      POCT Blood Glucose.: 101 mg/dL (10 Jul 2023 13:21)        Urinalysis Basic - ( 10 Jul 2023 17:19 )    Color: Colorless / Appearance: Clear / S.008 / pH: x  Gluc: x / Ketone: Negative  / Bili: Negative / Urobili: Negative   Blood: x / Protein: Negative / Nitrite: Negative   Leuk Esterase: Negative / RBC: 0 /hpf / WBC 0 /HPF   Sq Epi: x / Non Sq Epi: x / Bacteria: Negative        RADIOLOGY & ADDITIONAL TESTS:    Imaging Personally Reviewed:  [ ] YES  [ ] NO    HEALTH ISSUES - PROBLEM Dx:  Transplanted heart    Nicole syndrome    Receiving intravenous antibiotic treatment at home    Chronic atrial fibrillation    Hypertension    Chronic kidney disease (CKD)    Encounter for deep vein thrombosis (DVT) prophylaxis    Type 2 diabetes mellitus    Systemic inflammatory response syndrome (SIRS)         YVONNE BILLY  73y  Male    Patient is a 73y old  Male who presents with a chief complaint of sepsis w/u (10 Jul 2023 22:08)    72 yo M w/ PMHx T2DM, OHT 2018 (on tacrolimus) with a LAD-RV fistula and hx of graft dysfunction with DSAs treated with PLEX/IVIG/rituximab, 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 Per rehab records, pt was started on meropenem on . Pt stated that he feels fine and was feeling well at rehab. Although, he stated he did not like the rehab it was at and said it was "dirty." He denied fever, chills, weakness, neck pain, cough, wheezing, chest pain, abdominal pain, dysuria or urinary frequency.     INTERVAL HPI/OVERNIGHT EVENTS:  -     T(C): 36.3 (23 @ 05:40), Max: 37.2 (07-10-23 @ 11:04)  HR: 101 (23 @ 05:40) (68 - 109)  BP: 123/83 (23 @ 05:40) (113/79 - 123/83)  RR: 16 (23 @ 05:40) (15 - 18)  SpO2: 97% (23 @ 05:40) (95% - 100%)  Wt(kg): --Vital Signs Last 24 Hrs  T(C): 36.3 (2023 05:40), Max: 37.2 (10 Jul 2023 11:04)  T(F): 97.4 (2023 05:40), Max: 98.9 (10 Jul 2023 11:04)  HR: 101 (2023 05:40) (68 - 109)  BP: 123/83 (2023 05:40) (113/79 - 123/83)  BP(mean): 96 (10 Jul 2023 22:12) (96 - 96)  RR: 16 (2023 05:40) (15 - 18)  SpO2: 97% (2023 05:40) (95% - 100%)    Parameters below as of 2023 05:40  Patient On (Oxygen Delivery Method): room air    PHYSICAL EXAM:  GENERAL: NAD, well-groomed, well-developed  HEAD:  Atraumatic, Normocephalic  EYES: EOMI, PERRLA, conjunctiva and sclera clear, no jaundice   ENMT: No tonsillar erythema, exudates, or enlargement; Moist mucous membranes  NECK: Supple, non tender, No JVD, Normal thyroid  HEART: Regular rate and rhythm; No murmurs, rubs, or gallops. S1/S2 present  RESPIRATORY: CTA B/L, No W/R/R  ABDOMEN: Soft, Nontender, Nondistended; Bowel sounds present. No hepatosplenomegaly  NEUROLOGY: A&Ox3, nonfocal, moving all extremities. 5/5 strength upper extremities. 3/5 RLE, 4/5 LLE.  EXTREMITIES:  2+ Peripheral Pulses, No clubbing, cyanosis, or edema. 5/5 muscle tone in UE/LE  SKIN: Dry flaky skin of bilateral lower extremities. No rash or abnormal lesions *********    Consultant(s) Notes Reviewed:  [x ] YES  [ ] NO  Care Discussed with Consultants/Other Providers [ x] YES  [ ] NO    LABS:                        9.9    15.20 )-----------( 274      ( 10 Jul 2023 14:11 )             34.5     07-10    140  |  104  |  30<H>  ----------------------------<  92  4.7   |  20<L>  |  1.17    Ca    9.2      10 Jul 2023 14:11    TPro  6.7  /  Alb  4.0  /  TBili  0.6  /  DBili  x   /  AST  20  /  ALT  11  /  AlkPhos  53  07-10        Urinalysis Basic - ( 10 Jul 2023 17:19 )    Color: Colorless / Appearance: Clear / S.008 / pH: x  Gluc: x / Ketone: Negative  / Bili: Negative / Urobili: Negative   Blood: x / Protein: Negative / Nitrite: Negative   Leuk Esterase: Negative / RBC: 0 /hpf / WBC 0 /HPF   Sq Epi: x / Non Sq Epi: x / Bacteria: Negative      CAPILLARY BLOOD GLUCOSE      POCT Blood Glucose.: 101 mg/dL (10 Jul 2023 13:21)        Urinalysis Basic - ( 10 Jul 2023 17:19 )    Color: Colorless / Appearance: Clear / S.008 / pH: x  Gluc: x / Ketone: Negative  / Bili: Negative / Urobili: Negative   Blood: x / Protein: Negative / Nitrite: Negative   Leuk Esterase: Negative / RBC: 0 /hpf / WBC 0 /HPF   Sq Epi: x / Non Sq Epi: x / Bacteria: Negative        RADIOLOGY & ADDITIONAL TESTS:    Imaging Personally Reviewed:  [ ] YES  [ ] NO    HEALTH ISSUES - PROBLEM Dx:  Transplanted heart    Nicole syndrome    Receiving intravenous antibiotic treatment at home    Chronic atrial fibrillation    Hypertension    Chronic kidney disease (CKD)    Encounter for deep vein thrombosis (DVT) prophylaxis    Type 2 diabetes mellitus    Systemic inflammatory response syndrome (SIRS)         YVONNEBILLY  73y  Male    Patient is a 73y old  Male who presents with a chief complaint of sepsis w/u (10 Jul 2023 22:08)    INTERVAL HPI/OVERNIGHT EVENTS:  - feeling fine today; denies fevers, chills, cough, dyspnea, headache, chest pain, abdominal pain, n/v/d, dysuria, arthralgias  - contacted rehab facility for collateral info, requested to have records faxed. Per representative at [facility], patient had routine CBC and was found to have WBC 16- no culture data, no further workup, was started on meropenem empirically but no source of infection identified    T(C): 36.3 (23 @ 05:40), Max: 37.2 (07-10-23 @ 11:04)  HR: 101 (23 @ 05:40) (68 - 109)  BP: 123/83 (23 @ 05:40) (113/79 - 123/83)  RR: 16 (23 @ 05:40) (15 - 18)  SpO2: 97% (23 @ 05:40) (95% - 100%)  Wt(kg): --Vital Signs Last 24 Hrs  T(C): 36.3 (2023 05:40), Max: 37.2 (10 Jul 2023 11:04)  T(F): 97.4 (2023 05:40), Max: 98.9 (10 Jul 2023 11:04)  HR: 101 (2023 05:40) (68 - 109)  BP: 123/83 (2023 05:40) (113/79 - 123/83)  BP(mean): 96 (10 Jul 2023 22:12) (96 - 96)  RR: 16 (2023 05:40) (15 - 18)  SpO2: 97% (2023 05:40) (95% - 100%)    Parameters below as of 2023 05:40  Patient On (Oxygen Delivery Method): room air    PHYSICAL EXAM:  GENERAL: NAD, well-groomed, well-developed  HEAD:  Atraumatic, Normocephalic  EYES: EOMI, PERRLA, conjunctiva and sclera clear, no jaundice   ENMT: Moist mucous membranes  HEART: Regular rate and rhythm; No murmurs, rubs, or gallops. S1/S2 present  RESPIRATORY: CTA B/L, No W/R/R  ABDOMEN: Soft, Nontender, Nondistended.  NEUROLOGY: A&Ox3, nonfocal, moving all extremities.   EXTREMITIES:  2+ Peripheral Pulses, No clubbing or cyanosis. LE pitting edema.   SKIN: Dry flaky skin of bilateral lower extremities.     Consultant(s) Notes Reviewed:  [x ] YES  [ ] NO  Care Discussed with Consultants/Other Providers [ x] YES  [ ] NO    LABS:                        9.9    15.20 )-----------( 274      ( 10 Jul 2023 14:11 )             34.5     07-10    140  |  104  |  30<H>  ----------------------------<  92  4.7   |  20<L>  |  1.17    Ca    9.2      10 Jul 2023 14:11    TPro  6.7  /  Alb  4.0  /  TBili  0.6  /  DBili  x   /  AST  20  /  ALT  11  /  AlkPhos  53  07-10        Urinalysis Basic - ( 10 Jul 2023 17:19 )    Color: Colorless / Appearance: Clear / S.008 / pH: x  Gluc: x / Ketone: Negative  / Bili: Negative / Urobili: Negative   Blood: x / Protein: Negative / Nitrite: Negative   Leuk Esterase: Negative / RBC: 0 /hpf / WBC 0 /HPF   Sq Epi: x / Non Sq Epi: x / Bacteria: Negative      CAPILLARY BLOOD GLUCOSE      POCT Blood Glucose.: 101 mg/dL (10 Jul 2023 13:21)        Urinalysis Basic - ( 10 Jul 2023 17:19 )    Color: Colorless / Appearance: Clear / S.008 / pH: x  Gluc: x / Ketone: Negative  / Bili: Negative / Urobili: Negative   Blood: x / Protein: Negative / Nitrite: Negative   Leuk Esterase: Negative / RBC: 0 /hpf / WBC 0 /HPF   Sq Epi: x / Non Sq Epi: x / Bacteria: Negative        RADIOLOGY & ADDITIONAL TESTS:    Imaging Personally Reviewed:  [ ] YES  [ ] NO    HEALTH ISSUES - PROBLEM Dx:  Transplanted heart    Nicole syndrome    Receiving intravenous antibiotic treatment at home    Chronic atrial fibrillation    Hypertension    Chronic kidney disease (CKD)    Encounter for deep vein thrombosis (DVT) prophylaxis    Type 2 diabetes mellitus    Systemic inflammatory response syndrome (SIRS)

## 2023-07-11 NOTE — PROGRESS NOTE ADULT - SUBJECTIVE AND OBJECTIVE BOX
INFECTIOUS DISEASES FOLLOW UP-- Sujey Lujan  [phone]    This is a follow up note for this  73yMale with  Anemia        ROS:  CONSTITUTIONAL:  No fever, good appetite  CARDIOVASCULAR:  No chest pain or palpitations  RESPIRATORY:  No dyspnea  GASTROINTESTINAL:  No nausea, vomiting, diarrhea, or abdominal pain  GENITOURINARY:  No dysuria  NEUROLOGIC:  No headache,     Allergies    No Known Allergies    Intolerances        ANTIBIOTICS/RELEVANT:  antimicrobials  meropenem  IVPB 500 milliGRAM(s) IV Intermittent every 8 hours  meropenem  IVPB      nystatin    Suspension 547494 Unit(s) Oral three times a day  trimethoprim   80 mG/sulfamethoxazole 400 mG 1 Tablet(s) Oral <User Schedule>  valGANciclovir 450 milliGRAM(s) Oral <User Schedule>    immunologic:  tacrolimus 2 milliGRAM(s) Oral two times a day    OTHER:  acetaminophen     Tablet .. 650 milliGRAM(s) Oral every 6 hours PRN  apixaban 5 milliGRAM(s) Oral every 12 hours  aspirin  chewable 81 milliGRAM(s) Oral daily  atorvastatin 10 milliGRAM(s) Oral at bedtime  buMETAnide 1 milliGRAM(s) Oral daily  dextrose 5%. 1000 milliLiter(s) IV Continuous <Continuous>  dextrose 5%. 1000 milliLiter(s) IV Continuous <Continuous>  dextrose 50% Injectable 25 Gram(s) IV Push once  dextrose 50% Injectable 25 Gram(s) IV Push once  dextrose 50% Injectable 12.5 Gram(s) IV Push once  dextrose Oral Gel 15 Gram(s) Oral once PRN  glucagon  Injectable 1 milliGRAM(s) IntraMuscular once  insulin lispro (ADMELOG) corrective regimen sliding scale   SubCutaneous at bedtime  insulin lispro (ADMELOG) corrective regimen sliding scale   SubCutaneous three times a day before meals  latanoprost 0.005% Ophthalmic Solution 1 Drop(s) Both EYES at bedtime  melatonin 3 milliGRAM(s) Oral at bedtime PRN  metoprolol succinate  milliGRAM(s) Oral daily  pantoprazole    Tablet 40 milliGRAM(s) Oral before breakfast  predniSONE   Tablet 10 milliGRAM(s) Oral three times a day      Objective:  Vital Signs Last 24 Hrs  T(C): 36.5 (11 Jul 2023 09:50), Max: 37 (10 Jul 2023 13:58)  T(F): 97.7 (11 Jul 2023 09:50), Max: 98.6 (10 Jul 2023 13:58)  HR: 103 (11 Jul 2023 09:50) (68 - 109)  BP: 116/78 (11 Jul 2023 09:50) (114/86 - 123/83)  BP(mean): 96 (10 Jul 2023 22:12) (96 - 96)  RR: 18 (11 Jul 2023 09:50) (15 - 18)  SpO2: 98% (11 Jul 2023 09:50) (95% - 100%)    Parameters below as of 11 Jul 2023 09:50  Patient On (Oxygen Delivery Method): room air        PHYSICAL EXAM:  Constitutional:no acute distress  Eyes:WALT, EOMI  Ear/Nose/Throat: no oral lesions, 	  Respiratory: clear BL but decreased at bases rt>lt  Cardiovascular: S1S2  Gastrointestinal:soft, (+) BS, no tenderness  Extremities:no e/e/c  No Lymphadenopathy  IV sites not inflammed.    LABS:                        10.0   14.84 )-----------( 282      ( 11 Jul 2023 10:42 )             33.6     07-11    135  |  101  |  29<H>  ----------------------------<  148<H>  4.7   |  23  |  1.11    Ca    9.0      11 Jul 2023 10:42    TPro  6.4  /  Alb  3.8  /  TBili  0.7  /  DBili  x   /  AST  14  /  ALT  9<L>  /  AlkPhos  50  07-11    PT/INR - ( 11 Jul 2023 10:42 )   PT: 16.3 sec;   INR: 1.40 ratio         PTT - ( 11 Jul 2023 10:42 )  PTT:32.6 sec  Urinalysis Basic - ( 11 Jul 2023 10:42 )    Color: x / Appearance: x / SG: x / pH: x  Gluc: 148 mg/dL / Ketone: x  / Bili: x / Urobili: x   Blood: x / Protein: x / Nitrite: x   Leuk Esterase: x / RBC: x / WBC x   Sq Epi: x / Non Sq Epi: x / Bacteria: x        MICROBIOLOGY:    Urinalysis + Microscopic Examination (07.10.23 @ 17:19)    pH Urine: 6.0   Urine Appearance: Clear   Color: Colorless   Specific Gravity: 1.008   Protein, Urine: Negative   Glucose Qualitative, Urine: Negative   Ketone - Urine: Negative   Blood, Urine: Negative   Bilirubin: Negative   Urobilinogen: Negative   Leukocyte Esterase Concentration: Negative   Nitrite: Negative   White Blood Cell - Urine: 0 /HPF   Red Blood Cell - Urine: 0 /hpf   Bacteria: Negative   Hyaline Casts: 1 /lpf   Squamous Epithelial Cells: 0 /hpf    Respiratory Viral Panel with COVID-19 by MEHNAZ (07.10.23 @ 14:10)    Rapid RVP Result: [facility]   [facility]: This Respiratory Panel uses polymerase chain reaction (PCR) to detect for  adenovirus; coronavirus (HKU1, NL63, 229E, OC43); human metapneumovirus  (hMPV); human enterovirus/rhinovirus (Entero/RV); influenza A; influenza  A/H1; influenza A/H3; influenza A/H1-2009; influenza B; parainfluenza  viruses 1, 2, 3, 4; respiratory syncytial virus; Mycoplasma pneumoniae;  Chlamydophila pneumoniae; and SARS-CoV-2.            RECENT CULTURES:  blood cultures sent and pending    RADIOLOGY & ADDITIONAL STUDIES:    < from: CT Chest w/ IV Cont (07.11.23 @ 08:57) >  IMPRESSION:  *  Elevation of the right hemidiaphragm with right lower lobe   consolidation consistent with pneumonia and/or atelectasis, similar to   findings present on prior exam.  *  No evidence of acute abnormality in the abdomen and pelvis.    < end of copied text >

## 2023-07-11 NOTE — PROGRESS NOTE ADULT - PROBLEM SELECTOR PLAN 2
-appreciate transplant team recs  - c/w tacro 2mg BID;   -daily Tacro level; goal tacro level 4-6,   -transplant SW team follow up to establish plan for Safe discharge when ready as he WILL NOT be Safe to going back to prior rehab facility  -TTE  -consider non con CT a/p - appreciate transplant team recs  - tacro increased to 3 mg BID to match home dose (upon admission was started on 2 mg BID but confirmed outpatient dose is 3 mg BID)  -daily Tacro level (needs to be obtained ~30 min before AM dose given); goal tacro level 4-6,   -transplant SW team follow up to establish plan for Safe discharge when ready - evaluated by PT today, planning for d/c home with home PT  - pending TTE

## 2023-07-11 NOTE — PHYSICAL THERAPY INITIAL EVALUATION ADULT - ADDITIONAL COMMENTS
lives with brother in house with 3 steps to enter, uses cane primarily for mobility, IND; has HHA 5d/8h and family support

## 2023-07-11 NOTE — PROGRESS NOTE ADULT - PROBLEM SELECTOR PLAN 1
- c/w tacro 2mg BID;   - Please send daily Tacro level 30MINS before AM dose; goal tacro level 4-6,   - remains off cellcept  heartcare normal 5/17, DSA pending   - Will have transplant SW team follow up to establish plan for Safe discharge when ready as he WILL NOT be Safe to going back to prior rehab facility  - Repeat TTE pending  - Please admit to [facility] or [facility] when bed is available

## 2023-07-11 NOTE — PROGRESS NOTE ADULT - PROBLEM SELECTOR PLAN 5
Pt takes admelog 7u tid and glargine 8u at bedtime    -ISS  -CC  -hold home insulin for now as blood glucose of 92 and hx of hypoglycemia

## 2023-07-11 NOTE — PROGRESS NOTE ADULT - SUBJECTIVE AND OBJECTIVE BOX
ADVANCED HEART FAILURE & TRANSPLANT  - PROGRESS NOTE  *To reach the NS1 Team from 8am to 5pm (MON-FRI), please call [phone],   _______________________________________________________________________________________________________     Subjective:    Medications:  acetaminophen     Tablet .. 650 milliGRAM(s) Oral every 6 hours PRN  apixaban 5 milliGRAM(s) Oral every 12 hours  aspirin  chewable 81 milliGRAM(s) Oral daily  atorvastatin 10 milliGRAM(s) Oral at bedtime  buMETAnide 1 milliGRAM(s) Oral daily  dextrose 5%. 1000 milliLiter(s) IV Continuous <Continuous>  dextrose 5%. 1000 milliLiter(s) IV Continuous <Continuous>  dextrose 50% Injectable 25 Gram(s) IV Push once  dextrose 50% Injectable 25 Gram(s) IV Push once  dextrose 50% Injectable 12.5 Gram(s) IV Push once  dextrose Oral Gel 15 Gram(s) Oral once PRN  glucagon  Injectable 1 milliGRAM(s) IntraMuscular once  insulin lispro (ADMELOG) corrective regimen sliding scale   SubCutaneous at bedtime  insulin lispro (ADMELOG) corrective regimen sliding scale   SubCutaneous three times a day before meals  latanoprost 0.005% Ophthalmic Solution 1 Drop(s) Both EYES at bedtime  melatonin 3 milliGRAM(s) Oral at bedtime PRN  meropenem  IVPB      meropenem  IVPB 500 milliGRAM(s) IV Intermittent every 8 hours  metoprolol succinate  milliGRAM(s) Oral daily  nystatin    Suspension 528680 Unit(s) Oral three times a day  pantoprazole    Tablet 40 milliGRAM(s) Oral before breakfast  predniSONE   Tablet 10 milliGRAM(s) Oral three times a day  tacrolimus 2 milliGRAM(s) Oral two times a day  trimethoprim   80 mG/sulfamethoxazole 400 mG 1 Tablet(s) Oral <User Schedule>  valGANciclovir 450 milliGRAM(s) Oral <User Schedule>      Physical Exam:    Vitals:  Vital Signs Last 24 Hours  T(C): 36.3 (07-11-23 @ 05:40), Max: 37.2 (07-10-23 @ 11:04)  HR: 101 (07-11-23 @ 05:40) (68 - 109)  BP: 123/83 (07-11-23 @ 05:40) (113/79 - 123/83)  RR: 16 (07-11-23 @ 05:40) (15 - 18)  SpO2: 97% (07-11-23 @ 05:40) (95% - 100%)        I&O's Summary      Tele:    General: No distress. Comfortable.  HEENT: EOM intact.  Neck: Neck supple. JVP not elevated. No masses  Chest: Clear to auscultation bilaterally  CV: Normal S1 and S2. No murmurs, rub, or gallops. Radial pulses normal.  Abdomen: Soft, non-distended, non-tender  Skin: No rashes or skin breakdown  Extremities: No LE edema  Neurology: Alert and oriented times three. Sensation intact  Psych: Affect normal    Labs:                        9.9    15.20 )-----------( 274      ( 10 Jul 2023 14:11 )             34.5     07-10    140  |  104  |  30<H>  ----------------------------<  92  4.7   |  20<L>  |  1.17    Ca    9.2      10 Jul 2023 14:11    TPro  6.7  /  Alb  4.0  /  TBili  0.6  /  DBili  x   /  AST  20  /  ALT  11  /  AlkPhos  53  07-10                   ADVANCED HEART FAILURE & TRANSPLANT  - PROGRESS NOTE  *To reach the NS1 Team from 8am to 5pm (MON-FRI), please call [phone],   _______________________________________________________________________________________________________     Subjective:  - NAEO  - Sitting up in bed, denies chills      Medications:  acetaminophen     Tablet .. 650 milliGRAM(s) Oral every 6 hours PRN  apixaban 5 milliGRAM(s) Oral every 12 hours  aspirin  chewable 81 milliGRAM(s) Oral daily  atorvastatin 10 milliGRAM(s) Oral at bedtime  buMETAnide 1 milliGRAM(s) Oral daily  dextrose 5%. 1000 milliLiter(s) IV Continuous <Continuous>  dextrose 5%. 1000 milliLiter(s) IV Continuous <Continuous>  dextrose 50% Injectable 25 Gram(s) IV Push once  dextrose 50% Injectable 25 Gram(s) IV Push once  dextrose 50% Injectable 12.5 Gram(s) IV Push once  dextrose Oral Gel 15 Gram(s) Oral once PRN  glucagon  Injectable 1 milliGRAM(s) IntraMuscular once  insulin lispro (ADMELOG) corrective regimen sliding scale   SubCutaneous at bedtime  insulin lispro (ADMELOG) corrective regimen sliding scale   SubCutaneous three times a day before meals  latanoprost 0.005% Ophthalmic Solution 1 Drop(s) Both EYES at bedtime  melatonin 3 milliGRAM(s) Oral at bedtime PRN  meropenem  IVPB      meropenem  IVPB 500 milliGRAM(s) IV Intermittent every 8 hours  metoprolol succinate  milliGRAM(s) Oral daily  nystatin    Suspension 777791 Unit(s) Oral three times a day  pantoprazole    Tablet 40 milliGRAM(s) Oral before breakfast  predniSONE   Tablet 10 milliGRAM(s) Oral three times a day  tacrolimus 2 milliGRAM(s) Oral two times a day  trimethoprim   80 mG/sulfamethoxazole 400 mG 1 Tablet(s) Oral <User Schedule>  valGANciclovir 450 milliGRAM(s) Oral <User Schedule>      Physical Exam:    Vitals:  Vital Signs Last 24 Hours  T(C): 36.3 (07-11-23 @ 05:40), Max: 37.2 (07-10-23 @ 11:04)  HR: 101 (07-11-23 @ 05:40) (68 - 109)  BP: 123/83 (07-11-23 @ 05:40) (113/79 - 123/83)  RR: 16 (07-11-23 @ 05:40) (15 - 18)  SpO2: 97% (07-11-23 @ 05:40) (95% - 100%)        I&O's Summary    General: No distress. Comfortable.  HEENT: EOM intact.  Neck: Neck supple. JVP not elevated. No masses  Chest: Clear to auscultation bilaterally  CV: Normal S1 and S2. No murmurs, rub, or gallops. Radial pulses normal.  Abdomen: Soft, non-distended, non-tender  Skin: No rashes or skin breakdown  Extremities: No LE edema  Neurology: Alert and oriented times three. Sensation intact  Psych: Affect normal    Labs:                        9.9    15.20 )-----------( 274      ( 10 Jul 2023 14:11 )             34.5     07-10    140  |  104  |  30<H>  ----------------------------<  92  4.7   |  20<L>  |  1.17    Ca    9.2      10 Jul 2023 14:11    TPro  6.7  /  Alb  4.0  /  TBili  0.6  /  DBili  x   /  AST  20  /  ALT  11  /  AlkPhos  53  07-10

## 2023-07-11 NOTE — PROGRESS NOTE ADULT - PROBLEM SELECTOR PLAN 3
-trend Hgb, currently at baseline  - c/w Prednisone 30mg QD  - per transplant team, they recommend Hematology consult for consideration of rituxan while admitted; follows with Dr. Rosario for hemolytic anemia; As an outpt discussed possibly slow wean of prednisone  -restart protonix Follows with Dr. Rosario. As an outpatient, discussed possibly slow wean prednisone.  - hematology consulted, f/u recs  - Hb currently at baseline (~9)  - c/w Prednisone 30mg QD  - continue protonix for GI protection on prednisone  - continue bactrim for PJP ppx Follows with Dr. Rosario. As an outpatient, discussed possibly slow wean prednisone.  - hematology following, appreciate recs  - will trend hemolysis markers daily: LDH, reticulocyte count, CMP, phos, fractionated direct and indirect bilirubin, haptoglobin  - Hb currently at baseline (~9)  - c/w Prednisone 30mg QD  - continue protonix for GI protection on prednisone  - continue bactrim for PJP ppx

## 2023-07-11 NOTE — PHYSICAL THERAPY INITIAL EVALUATION ADULT - PERTINENT HX OF CURRENT PROBLEM, REHAB EVAL
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

## 2023-07-11 NOTE — PHYSICAL THERAPY INITIAL EVALUATION ADULT - BALANCE TRAINING, PT EVAL
GOAL: Patient will demonstrate an increase in static/dynamic balance in sitting/standing where deficient by at least 1 grade to facilitate greater independence during functional mobility and ADL's. .

## 2023-07-11 NOTE — PROGRESS NOTE ADULT - PROBLEM SELECTOR PLAN 4
-trend Cr  -currently much improved since prior admission SCr 1.17 -> 1.11. On previous admission, SCr as high as 3.71, decreased to 1.74 by d/c.   -currently much improved since prior admission  - avoid nephrotoxic agents

## 2023-07-11 NOTE — PROGRESS NOTE ADULT - PROBLEM SELECTOR PLAN 1
Pt w/ leukocytosis and tachycardia on admission, started on IV meropenem on 7/8 at rehab for unknown reason. CXR showing R basilar atelectasis.    -trend WBC  -c/w meropenem  -call rehab in AM for collateral regarding infectious source  -blood cx  -urine cx  -CXR atelectasis  -RVP neg    - seen by transplant ID who recs:  Obtain CT scan of chest/abdomen  check ESR and CRP  send quantiferon - as patient with PPD in his right forearm  Complete a week of IV meropenem and then discontinue Pt w/ leukocytosis and tachycardia on admission, started on IV meropenem on 7/8 at rehab for elevated WBC. No clear source of infx identified. CXR with R basilar atelectasis, CT chest & abdomen with RLL consolidation and R hemidiaphragm elevation, although this seems to be chronic when comparing to prior films. CXR showing R basilar ***.   CXR showing R basilar atelectasis.    -trend WBC  -c/w meropenem  -call rehab in AM for collateral regarding infectious source  -blood cx  -urine cx  -CXR atelectasis  -RVP neg    - seen by transplant ID who recs:  Obtain CT scan of chest/abdomen  check ESR and CRP  send quantiferon - as patient with PPD in his right forearm  Complete a week of IV meropenem and then discontinue Pt w/ leukocytosis and tachycardia on admission, started on IV meropenem on 7/8 at rehab for elevated WBC. No clear source of infx identified. CXR with R basilar atelectasis, CT chest & abdomen with RLL consolidation and R hemidiaphragm elevation, although this seems to be chronic when comparing to prior films. CXR showing R basilar atelectasis. Urine negative.     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 No clear source of infx identified. CXR with R basilar atelectasis, CT chest & abdomen with RLL consolidation and R hemidiaphragm elevation, although this seems to be chronic when comparing to prior films. CXR showing R basilar atelectasis. UA unremarkable. RVP negative.    - trend WBC  - c/w meropenem for 1 week course   - f/u blood, urine cultures  - pulmonology consult in AM for consideration of bronch given CT chest findings   - f/u quantiferon (pt with PPD in forearm from JP)  - f/u ESR, CRP  - f/u legionella urine ag  - f/u serum fungitell, galactomanannan Pt w/ leukocytosis and tachycardia on admission, started on IV meropenem on 7/8 at rehab for elevated WBC. No clear source of infx identified. CXR with R basilar atelectasis, CT chest & abdomen with RLL consolidation and R hemidiaphragm elevation, although this seems to be chronic when comparing to prior films. CXR showing R basilar atelectasis. UA unremarkable. RVP negative.    - ID following, appreciate recs  - trend WBC  - c/w meropenem for 1 week course   - f/u blood, urine cultures  - pulmonology consult in AM for consideration of bronch given CT chest findings   - f/u quantiferon (pt with PPD in forearm from JP)  - f/u ESR, CRP  - f/u legionella urine ag  - f/u serum fungitell, galactomanannan Pt w/ leukocytosis and tachycardia on admission, started on IV meropenem on 7/8 at rehab for elevated WBC. No clear source of infx identified. CXR with R basilar atelectasis, CT chest & abdomen with RLL consolidation and R hemidiaphragm elevation, although this seems to be chronic when comparing to prior films. CXR showing R basilar atelectasis. UA unremarkable. RVP negative. Procal 0.11.     - ID following, appreciate recs  - trend WBC  - c/w meropenem for 1 week course   - f/u blood, urine cultures  - pulmonology consult in AM for consideration of bronch given CT chest findings   - f/u quantiferon (pt with PPD in forearm from JP)  - f/u ESR, CRP  - f/u legionella urine ag, serum fungitell, galactomannan

## 2023-07-11 NOTE — PHYSICAL THERAPY INITIAL EVALUATION ADULT - NSPTDMEREC_GEN_A_CORE
owns analy, RW
Implemented All Fall Risk Interventions:  [place] to call system. Call bell, personal items and telephone within reach. Instruct patient to call for assistance. Room bathroom lighting operational. Non-slip footwear when patient is off stretcher. Physically safe environment: no spills, clutter or unnecessary equipment. Stretcher in lowest position, wheels locked, appropriate side rails in place. Provide visual cue, wrist band, yellow gown, etc. Monitor gait and stability. Monitor for mental status changes and reorient to person, place, and time. Review medications for side effects contributing to fall risk. Reinforce activity limits and safety measures with patient and family.

## 2023-07-11 NOTE — PROGRESS NOTE ADULT - ASSESSMENT
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

## 2023-07-11 NOTE — PROGRESS NOTE ADULT - ATTENDING COMMENTS
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   Pt is poor historian, does not know why he was started on abx; per the teams discussion with rehab, pt noted with elevated WBC which prompted them to start abx; pt denies any infectious or localizing symptoms. Labs notable for elevated WBC ~ 15, improved Cr from prior.     - CT C/A/P with ?RLL consolidation, which was noted on prior imaging as well, ?PNA   - f/u culture data   - ID following, will c/w meropenem for now   - pulmonary consult for possible bronch/BAL  - c/w tacro 3mg BID, check levels in AM   - c/w prednisone 30mg for Nicole syndrome, hematology input appreciated   -  HF following, appears near euvolemia - c/w current bumex dose     #leukocytosis, SIRS  #s/p heart transplant   #Nicole syndrome  #T2DM   #chronic Afib 74 yo M w/ PMHx OHT 2/2018 (on tacrolimus) with a LAD-RV fistula and hx of graft dysfunction with DSAs treated with PLEX/IVIG/rituximab, 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   Pt is poor historian, does not know why he was started on abx; per the teams discussion with rehab, pt noted with elevated WBC which prompted them to start abx; pt denies any infectious or localizing symptoms. Labs notable for elevated WBC ~ 15, improved Cr from prior.     - CT C/A/P with ?RLL consolidation, which was noted on prior imaging as well, ?PNA   - f/u culture data   - ID following, will c/w meropenem for now   - pulmonary consult for possible bronch/BAL  - c/w tacro 3mg BID, check levels in AM   - c/w prednisone 30mg for Nicole syndrome, hematology input appreciated   -  HF following, appears near euvolemia - c/w current bumex dose     #leukocytosis, SIRS  #s/p heart transplant   #Nicole syndrome  #T2DM   #chronic Afib.

## 2023-07-11 NOTE — PROGRESS NOTE ADULT - ASSESSMENT
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 His course was complicated by bilateral IJ/subclavian thrombi, a persistent small right sided pleural effusion, as well as acute on chronic renal failure of unclear etiology. Who presents to ED from HF clinic for ?infection on IV ABT as an outpt. Requiring evaluation given transplant Hx with labs.  Unclear etiology as patient appears well but does not have knowledge of any infections. Leukocytosis present on admission    Would:  Will try to contact Rehab. facility for further information  Blood cultures sent and pending  CT of chest shows RLL atelectasis vs consolidation with an elevated hemidiaphragm- difficult to really assess  Would ask Pulmonary to see patient and evaluate if a Bronch/BAL is feasible  try to induce sputum for bacterial and fungal stains and cultures  send legionella urine ag  serum fungitell and galactomannan    Gary Lujan MD  Can be called via Teams  After 5pm/weekends [phone]

## 2023-07-11 NOTE — PROGRESS NOTE ADULT - PROBLEM SELECTOR PLAN 3
- WBC 15; Would obtain Bld Cx, urine Cx and viral panel   - Would consider non-con CT C/A/P  - Appiate Transplant ID recs.

## 2023-07-11 NOTE — CONSULT NOTE ADULT - ASSESSMENT
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 Hospitalized for sepsis w/u followed by heme for hx of Nicole syndrome.     #Nicole Disease- Diagnosed 1/4/23 for hemolytic anemia with Hbg of 6.5. Treated w/ pulse dose dexamethasone, IVIG and 1 unit PRBC w/ prednisone. Tapering prednisone per Dr. Rosario.Currently on 30mg prednisone qd.      -C/w prednisone 30mg at throughout hospitalization and at discharge w/ further tapering per Dr. Rosario.   -Platelets (274) and anemia (Hbg 9.9) currently stable  -Trend CBC w/ differential daily. Consider supportive transfusion to maintain hbg > 7 and plt > 10.   -Trend hemolysis markers daily: LDH, reticulocyte count   -PPI for Gi Prophylaxis   -Follow up with Dr. 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 Hospitalized for sepsis w/u followed by heme for hx of Nicole syndrome.     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 Treated w/ pulse dose dexamethasone, IVIG and 1 unit PRBC w/ prednisone. Nicole syndrome relapse in 5/2023 after decreasing prednisone. Tapering prednisone per Dr. Rosario.Currently on 30mg prednisone qd.      #Nicole Disease-     -C/w prednisone 30mg at throughout hospitalization and at discharge w/ further tapering per Dr. Rosario.   -Platelets (274) and anemia (Hbg 9.9) currently stable  -Trend CBC w/ differential daily. Consider supportive transfusion to maintain hbg > 7 and plt > 10.   -Trend hemolysis markers daily: LDH, reticulocyte count   -PPI for Gi Prophylaxis   -Follow up with Dr. 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 Hospitalized for sepsis w/u followed by heme for hx of Nicole syndrome.     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 Treated w/ pulse dose dexamethasone, IVIG and 1 unit PRBC w/ prednisone. Nicole syndrome relapse in 5/2023 after decreasing prednisone. Tapering prednisone per Dr. Rosario. Currently on 30mg prednisone qd.      #Nicole Disease-     -C/w prednisone 30mg at throughout hospitalization and at discharge w/ further tapering per Dr. Rosario.   -Platelets (274) and anemia (Hbg 9.9) currently stable  -Trend CBC w/ differential daily. Transfuse for hg < 7.0 and platelets < 10k, < 20k if febrile and < 50k if bleeding  -Trend hemolysis markers daily: LDH, reticulocyte count   -PPI for Gi Prophylaxis   -Follow up with Dr. 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 Hospitalized for sepsis w/u followed by heme for hx of Nicole syndrome.     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 Treated w/ pulse dose dexamethasone, IVIG and 1 unit PRBC w/ prednisone. Nicole syndrome relapse in 5/2023 after decreasing prednisone. Tapering prednisone per Dr. Rosario. Currently on 30mg prednisone qd.      #Nicole Disease-     -C/w prednisone 30mg at throughout hospitalization and at discharge w/ further tapering per Dr. Rosario.   -Platelets (274) and anemia (Hbg 9.9) currently stable  -Trend CBC w/ differential daily. Transfuse for hg < 7.0 and platelets < 10k, < 20k if febrile and < 50k if bleeding  -Trend hemolysis markers daily: LDH, reticulocyte count   -PPI for Gi Prophylaxis   -CW Bactrim prophylaxis   -Follow up with Dr. 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 Hospitalized for sepsis w/u followed by heme for hx of Nicole syndrome.     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 Treated w/ pulse dose dexamethasone, IVIG and 1 unit PRBC w/ prednisone. Nicole syndrome relapse in 5/2023 after decreasing prednisone. Tapering prednisone per Dr. Rosario. Currently on 30mg prednisone qd.      #Nicole Disease-     -C/w prednisone 30mg at throughout hospitalization and at discharge w/ further tapering per Dr. Rosario.   - transfuse for hg < 7.0 and platelets < 10k, < 20k if febrile and < 50k if bleeding  -Platelets (274) and anemia (Hbg 9.9) currently stable  -Trend CBC w/ differential daily. Transfuse for hg < 7.0 and platelets < 10k, < 20k if febrile and < 50k if bleeding  -Trend hemolysis markers daily: LDH, reticulocyte count   -PPI for Gi Prophylaxis   -CW Bactrim prophylaxis   -Follow up with Dr. 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 Hospitalized for sepsis w/u followed by heme for hx of Nicole syndrome.     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 Treated w/ pulse dose dexamethasone, IVIG and 1 unit PRBC w/ prednisone. Nicole syndrome relapse in 5/2023 after decreasing prednisone. Tapering prednisone per Dr. Rosario. Currently on 30mg prednisone qd.      #Nicole Disease-     -C/w prednisone 30mg at throughout hospitalization and at discharge w/ further tapering per Dr. Rosario.   -Platelets (274) and anemia (Hbg 9.9) currently stable  -Trend CBC w/ differential daily. Transfuse for hg < 7.0 and platelets < 10k, < 20k if febrile and < 50k if bleeding  -Trend hemolysis markers daily: LDH, reticulocyte count, CMP, phos, fractionated direct and indirect bilirubin, haptoglobin  -PPI for Gi Prophylaxis   -CW Bactrim prophylaxis   -Follow up with Dr. Rosario at [facility] when stable for discharge

## 2023-07-12 LAB
-  COAGULASE NEGATIVE STAPHYLOCOCCUS: SIGNIFICANT CHANGE UP
ALBUMIN SERPL ELPH-MCNC: 3.9 G/DL — SIGNIFICANT CHANGE UP (ref 3.3–5)
ALP SERPL-CCNC: 48 U/L — SIGNIFICANT CHANGE UP (ref 40–120)
ALT FLD-CCNC: 7 U/L — LOW (ref 10–45)
ANION GAP SERPL CALC-SCNC: 12 MMOL/L — SIGNIFICANT CHANGE UP (ref 5–17)
ANION GAP SERPL CALC-SCNC: 17 MMOL/L — SIGNIFICANT CHANGE UP (ref 5–17)
AST SERPL-CCNC: 12 U/L — SIGNIFICANT CHANGE UP (ref 10–40)
BILIRUB DIRECT SERPL-MCNC: 0.1 MG/DL — SIGNIFICANT CHANGE UP (ref 0–0.3)
BILIRUB INDIRECT FLD-MCNC: 0.5 MG/DL — SIGNIFICANT CHANGE UP (ref 0.2–1)
BILIRUB SERPL-MCNC: 0.6 MG/DL — SIGNIFICANT CHANGE UP (ref 0.2–1.2)
BILIRUB SERPL-MCNC: 0.6 MG/DL — SIGNIFICANT CHANGE UP (ref 0.2–1.2)
BUN SERPL-MCNC: 39 MG/DL — HIGH (ref 7–23)
BUN SERPL-MCNC: 42 MG/DL — HIGH (ref 7–23)
CALCIUM SERPL-MCNC: 9 MG/DL — SIGNIFICANT CHANGE UP (ref 8.4–10.5)
CALCIUM SERPL-MCNC: 9.1 MG/DL — SIGNIFICANT CHANGE UP (ref 8.4–10.5)
CHLORIDE SERPL-SCNC: 101 MMOL/L — SIGNIFICANT CHANGE UP (ref 96–108)
CHLORIDE SERPL-SCNC: 102 MMOL/L — SIGNIFICANT CHANGE UP (ref 96–108)
CO2 SERPL-SCNC: 19 MMOL/L — LOW (ref 22–31)
CO2 SERPL-SCNC: 26 MMOL/L — SIGNIFICANT CHANGE UP (ref 22–31)
CREAT SERPL-MCNC: 1.59 MG/DL — HIGH (ref 0.5–1.3)
CREAT SERPL-MCNC: 1.89 MG/DL — HIGH (ref 0.5–1.3)
CULTURE RESULTS: SIGNIFICANT CHANGE UP
EGFR: 37 ML/MIN/1.73M2 — LOW
EGFR: 46 ML/MIN/1.73M2 — LOW
GLUCOSE BLDC GLUCOMTR-MCNC: 136 MG/DL — HIGH (ref 70–99)
GLUCOSE BLDC GLUCOMTR-MCNC: 145 MG/DL — HIGH (ref 70–99)
GLUCOSE BLDC GLUCOMTR-MCNC: 159 MG/DL — HIGH (ref 70–99)
GLUCOSE BLDC GLUCOMTR-MCNC: 172 MG/DL — HIGH (ref 70–99)
GLUCOSE SERPL-MCNC: 123 MG/DL — HIGH (ref 70–99)
GLUCOSE SERPL-MCNC: 132 MG/DL — HIGH (ref 70–99)
GRAM STN FLD: SIGNIFICANT CHANGE UP
HAPTOGLOB SERPL-MCNC: 182 MG/DL — SIGNIFICANT CHANGE UP (ref 34–200)
HCT VFR BLD CALC: 33.9 % — LOW (ref 39–50)
HGB BLD-MCNC: 9.8 G/DL — LOW (ref 13–17)
LDH SERPL L TO P-CCNC: 266 U/L — HIGH (ref 50–242)
MAGNESIUM SERPL-MCNC: 2.1 MG/DL — SIGNIFICANT CHANGE UP (ref 1.6–2.6)
MAGNESIUM SERPL-MCNC: 2.3 MG/DL — SIGNIFICANT CHANGE UP (ref 1.6–2.6)
MCHC RBC-ENTMCNC: 28.9 GM/DL — LOW (ref 32–36)
MCHC RBC-ENTMCNC: 29.1 PG — SIGNIFICANT CHANGE UP (ref 27–34)
MCV RBC AUTO: 100.6 FL — HIGH (ref 80–100)
METHOD TYPE: SIGNIFICANT CHANGE UP
NRBC # BLD: 0 /100 WBCS — SIGNIFICANT CHANGE UP (ref 0–0)
ORGANISM # SPEC MICROSCOPIC CNT: SIGNIFICANT CHANGE UP
ORGANISM # SPEC MICROSCOPIC CNT: SIGNIFICANT CHANGE UP
PHOSPHATE SERPL-MCNC: 4.2 MG/DL — SIGNIFICANT CHANGE UP (ref 2.5–4.5)
PHOSPHATE SERPL-MCNC: 4.2 MG/DL — SIGNIFICANT CHANGE UP (ref 2.5–4.5)
PLATELET # BLD AUTO: 273 K/UL — SIGNIFICANT CHANGE UP (ref 150–400)
POTASSIUM SERPL-MCNC: 4.6 MMOL/L — SIGNIFICANT CHANGE UP (ref 3.5–5.3)
POTASSIUM SERPL-MCNC: 5.5 MMOL/L — HIGH (ref 3.5–5.3)
POTASSIUM SERPL-SCNC: 4.6 MMOL/L — SIGNIFICANT CHANGE UP (ref 3.5–5.3)
POTASSIUM SERPL-SCNC: 5.5 MMOL/L — HIGH (ref 3.5–5.3)
PROT SERPL-MCNC: 6.3 G/DL — SIGNIFICANT CHANGE UP (ref 6–8.3)
RBC # BLD: 3.37 M/UL — LOW (ref 4.2–5.8)
RBC # BLD: 3.37 M/UL — LOW (ref 4.2–5.8)
RBC # FLD: 19.9 % — HIGH (ref 10.3–14.5)
RETICS #: 143.4 K/UL — HIGH (ref 25–125)
RETICS/RBC NFR: 4.2 % — HIGH (ref 0.5–2.5)
SODIUM SERPL-SCNC: 137 MMOL/L — SIGNIFICANT CHANGE UP (ref 135–145)
SODIUM SERPL-SCNC: 140 MMOL/L — SIGNIFICANT CHANGE UP (ref 135–145)
SPECIMEN SOURCE: SIGNIFICANT CHANGE UP
SPECIMEN SOURCE: SIGNIFICANT CHANGE UP
TACROLIMUS SERPL-MCNC: 2.9 NG/ML — SIGNIFICANT CHANGE UP
TACROLIMUS SERPL-MCNC: 5.2 NG/ML — SIGNIFICANT CHANGE UP
WBC # BLD: 13.6 K/UL — HIGH (ref 3.8–10.5)
WBC # FLD AUTO: 13.6 K/UL — HIGH (ref 3.8–10.5)

## 2023-07-12 PROCEDURE — 99232 SBSQ HOSP IP/OBS MODERATE 35: CPT | Mod: GC

## 2023-07-12 PROCEDURE — 99223 1ST HOSP IP/OBS HIGH 75: CPT | Mod: GC

## 2023-07-12 PROCEDURE — 93308 TTE F-UP OR LMTD: CPT | Mod: 26

## 2023-07-12 PROCEDURE — 99232 SBSQ HOSP IP/OBS MODERATE 35: CPT

## 2023-07-12 PROCEDURE — 93321 DOPPLER ECHO F-UP/LMTD STD: CPT | Mod: 26

## 2023-07-12 PROCEDURE — 93010 ELECTROCARDIOGRAM REPORT: CPT

## 2023-07-12 PROCEDURE — 93356 MYOCRD STRAIN IMG SPCKL TRCK: CPT

## 2023-07-12 RX ORDER — SODIUM ZIRCONIUM CYCLOSILICATE 10 G/10G
10 POWDER, FOR SUSPENSION ORAL ONCE
Refills: 0 | Status: COMPLETED | OUTPATIENT
Start: 2023-07-12 | End: 2023-07-12

## 2023-07-12 RX ORDER — BUMETANIDE 0.25 MG/ML
1 INJECTION INTRAMUSCULAR; INTRAVENOUS DAILY
Refills: 0 | Status: DISCONTINUED | OUTPATIENT
Start: 2023-07-12 | End: 2023-07-18

## 2023-07-12 RX ORDER — METOPROLOL TARTRATE 50 MG
100 TABLET ORAL DAILY
Refills: 0 | Status: DISCONTINUED | OUTPATIENT
Start: 2023-07-12 | End: 2023-07-18

## 2023-07-12 RX ADMIN — Medication 10 MILLIGRAM(S): at 21:57

## 2023-07-12 RX ADMIN — Medication 500000 UNIT(S): at 13:33

## 2023-07-12 RX ADMIN — SODIUM ZIRCONIUM CYCLOSILICATE 10 GRAM(S): 10 POWDER, FOR SUSPENSION ORAL at 08:26

## 2023-07-12 RX ADMIN — PANTOPRAZOLE SODIUM 40 MILLIGRAM(S): 20 TABLET, DELAYED RELEASE ORAL at 06:03

## 2023-07-12 RX ADMIN — LATANOPROST 1 DROP(S): 0.05 SOLUTION/ DROPS OPHTHALMIC; TOPICAL at 21:52

## 2023-07-12 RX ADMIN — Medication 10 MILLIGRAM(S): at 13:33

## 2023-07-12 RX ADMIN — Medication 100 MILLIGRAM(S): at 06:00

## 2023-07-12 RX ADMIN — Medication 500000 UNIT(S): at 21:51

## 2023-07-12 RX ADMIN — Medication 1 TABLET(S): at 08:27

## 2023-07-12 RX ADMIN — APIXABAN 5 MILLIGRAM(S): 2.5 TABLET, FILM COATED ORAL at 06:00

## 2023-07-12 RX ADMIN — Medication 10 MILLIGRAM(S): at 06:00

## 2023-07-12 RX ADMIN — Medication 81 MILLIGRAM(S): at 11:12

## 2023-07-12 RX ADMIN — MEROPENEM 100 MILLIGRAM(S): 1 INJECTION INTRAVENOUS at 05:59

## 2023-07-12 RX ADMIN — TACROLIMUS 3 MILLIGRAM(S): 5 CAPSULE ORAL at 06:03

## 2023-07-12 RX ADMIN — Medication 1: at 12:11

## 2023-07-12 RX ADMIN — ATORVASTATIN CALCIUM 10 MILLIGRAM(S): 80 TABLET, FILM COATED ORAL at 21:51

## 2023-07-12 RX ADMIN — MEROPENEM 100 MILLIGRAM(S): 1 INJECTION INTRAVENOUS at 13:34

## 2023-07-12 RX ADMIN — APIXABAN 5 MILLIGRAM(S): 2.5 TABLET, FILM COATED ORAL at 17:18

## 2023-07-12 RX ADMIN — BUMETANIDE 1 MILLIGRAM(S): 0.25 INJECTION INTRAMUSCULAR; INTRAVENOUS at 06:00

## 2023-07-12 RX ADMIN — Medication 500000 UNIT(S): at 05:59

## 2023-07-12 RX ADMIN — TACROLIMUS 3 MILLIGRAM(S): 5 CAPSULE ORAL at 17:19

## 2023-07-12 RX ADMIN — MEROPENEM 100 MILLIGRAM(S): 1 INJECTION INTRAVENOUS at 21:51

## 2023-07-12 NOTE — PROGRESS NOTE ADULT - ASSESSMENT
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 His course was complicated by bilateral IJ/subclavian thrombi, a persistent small right sided pleural effusion, as well as acute on chronic renal failure of unclear etiology. Who presents to ED from HF clinic for ?infection on IV ABT as an outpt. Requiring evaluation given transplant Hx with labs.       Cardiac Studies  7/12/23- LVIDd 4.3, LVEF 55%, septal motion abnormal, grossly mild-mod enlarged, no reported valvulopathy  TTE: 6.16- LVED 4.6, LVEF 58, mild enlarged. TAPSE 1.0 trace TR.   TTE 3/3/23: LVIDd 3.6, EF 50-55%, concentric remodeling    LHC/RHC 2/28/22: Diagnostic Conclusions: mLAD to RV fistula with LM-pLAD

## 2023-07-12 NOTE — PROGRESS NOTE ADULT - SUBJECTIVE AND OBJECTIVE BOX
BILLY RUSSELL  73y  Male    Patient is a 73y old  Male who presents with a chief complaint of sepsis w/u (11 Jul 2023 13:00)      INTERVAL HPI/OVERNIGHT EVENTS:      T(C): 36.5 (07-12-23 @ 00:20), Max: 36.5 (07-11-23 @ 09:50)  HR: 108 (07-12-23 @ 00:20) (103 - 108)  BP: 121/77 (07-12-23 @ 00:20) (111/79 - 121/77)  RR: 18 (07-12-23 @ 00:20) (18 - 18)  SpO2: 96% (07-12-23 @ 00:20) (96% - 98%)  Wt(kg): --Vital Signs Last 24 Hrs  T(C): 36.5 (12 Jul 2023 00:20), Max: 36.5 (11 Jul 2023 09:50)  T(F): 97.7 (12 Jul 2023 00:20), Max: 97.7 (11 Jul 2023 09:50)  HR: 108 (12 Jul 2023 00:20) (103 - 108)  BP: 121/77 (12 Jul 2023 00:20) (111/79 - 121/77)  BP(mean): --  RR: 18 (12 Jul 2023 00:20) (18 - 18)  SpO2: 96% (12 Jul 2023 00:20) (96% - 98%)    Parameters below as of 12 Jul 2023 00:20  Patient On (Oxygen Delivery Method): room air        PHYSICAL EXAM:  GENERAL: NAD, well-groomed, well-developed  HEAD:  Atraumatic, Normocephalic  EYES: EOMI, PERRLA, conjunctiva and sclera clear  ENMT: No tonsillar erythema, exudates, or enlargement; Moist mucous membranes, Good dentition, No lesions  NECK: Supple, No JVD, Normal thyroid  NERVOUS SYSTEM:  Alert & Oriented X3, Good concentration; Motor Strength 5/5 B/L upper and lower extremities; DTRs 2+ intact and symmetric  CHEST/LUNG: Clear to auscultation bilaterally; No rales, rhonchi, wheezing, or rubs  HEART: Regular rate and rhythm; No murmurs, rubs, or gallops  ABDOMEN: Soft, Nontender, Nondistended; Bowel sounds present  EXTREMITIES:  WWP, No clubbing, cyanosis, or edema  Vascular: 2+ peripheral pulses x4  LYMPH: No lymphadenopathy noted  SKIN: No rashes or lesions    Consultant(s) Notes Reviewed:  [x ] YES  [ ] NO  Care Discussed with Consultants/Other Providers [ x] YES  [ ] NO    LABS:                        10.0   14.84 )-----------( 282      ( 11 Jul 2023 10:42 )             33.6     07-11    135  |  101  |  29<H>  ----------------------------<  148<H>  4.7   |  23  |  1.11    Ca    9.0      11 Jul 2023 10:42    TPro  6.4  /  Alb  3.8  /  TBili  0.7  /  DBili  x   /  AST  14  /  ALT  9<L>  /  AlkPhos  50  07-11      PT/INR - ( 11 Jul 2023 10:42 )   PT: 16.3 sec;   INR: 1.40 ratio         PTT - ( 11 Jul 2023 10:42 )  PTT:32.6 sec  Urinalysis Basic - ( 11 Jul 2023 10:42 )    Color: x / Appearance: x / SG: x / pH: x  Gluc: 148 mg/dL / Ketone: x  / Bili: x / Urobili: x   Blood: x / Protein: x / Nitrite: x   Leuk Esterase: x / RBC: x / WBC x   Sq Epi: x / Non Sq Epi: x / Bacteria: x      CAPILLARY BLOOD GLUCOSE      POCT Blood Glucose.: 183 mg/dL (11 Jul 2023 21:38)  POCT Blood Glucose.: 140 mg/dL (11 Jul 2023 19:31)  POCT Blood Glucose.: 132 mg/dL (11 Jul 2023 11:48)  POCT Blood Glucose.: 121 mg/dL (11 Jul 2023 07:40)        Urinalysis Basic - ( 11 Jul 2023 10:42 )    Color: x / Appearance: x / SG: x / pH: x  Gluc: 148 mg/dL / Ketone: x  / Bili: x / Urobili: x   Blood: x / Protein: x / Nitrite: x   Leuk Esterase: x / RBC: x / WBC x   Sq Epi: x / Non Sq Epi: x / Bacteria: x        RADIOLOGY & ADDITIONAL TESTS:    Imaging Personally Reviewed:  [ ] YES  [ ] NO    HEALTH ISSUES - PROBLEM Dx:  Transplanted heart    Nicole syndrome    Receiving intravenous antibiotic treatment at home    Chronic atrial fibrillation    Hypertension    Chronic kidney disease (CKD)    Encounter for deep vein thrombosis (DVT) prophylaxis    Type 2 diabetes mellitus    Systemic inflammatory response syndrome (SIRS)         YVONNEBILLY  73y  Male    Patient is a 73y old  Male who presents with a chief complaint of sepsis w/u (11 Jul 2023 13:00)    INTERVAL HPI/OVERNIGHT EVENTS:  - NAEON  - feeling ok this morning, no new concerns  - denies cough, chest pain, abdominal pain, n/v/d, arthralgias, chills, malaise    T(C): 36.5 (07-12-23 @ 00:20), Max: 36.5 (07-11-23 @ 09:50)  HR: 108 (07-12-23 @ 00:20) (103 - 108)  BP: 121/77 (07-12-23 @ 00:20) (111/79 - 121/77)  RR: 18 (07-12-23 @ 00:20) (18 - 18)  SpO2: 96% (07-12-23 @ 00:20) (96% - 98%)  Wt(kg): --Vital Signs Last 24 Hrs  T(C): 36.5 (12 Jul 2023 00:20), Max: 36.5 (11 Jul 2023 09:50)  T(F): 97.7 (12 Jul 2023 00:20), Max: 97.7 (11 Jul 2023 09:50)  HR: 108 (12 Jul 2023 00:20) (103 - 108)  BP: 121/77 (12 Jul 2023 00:20) (111/79 - 121/77)  BP(mean): --  RR: 18 (12 Jul 2023 00:20) (18 - 18)  SpO2: 96% (12 Jul 2023 00:20) (96% - 98%)    Parameters below as of 12 Jul 2023 00:20  Patient On (Oxygen Delivery Method): room air    PHYSICAL EXAM:  GENERAL: NAD, non-toxic appearing  HEAD:  Atraumatic, Normocephalic  EYES: EOMI, PERRLA, conjunctiva and sclera clear, no jaundice   ENMT: Moist mucous membranes  HEART: Regular rate and rhythm; normal S1/S2  RESPIRATORY: CTA B/L, No W/R/R  ABDOMEN: Soft, Nontender, Nondistended.  NEUROLOGY: A&O to conversation, although ***  EXTREMITIES:  2+ Peripheral Pulses, No clubbing or cyanosis. LE pitting edema.   SKIN: Dry flaky skin of bilateral lower extremities.     Consultant(s) Notes Reviewed:  [x ] YES  [ ] NO  Care Discussed with Consultants/Other Providers [ x] YES  [ ] NO    LABS:                        10.0   14.84 )-----------( 282      ( 11 Jul 2023 10:42 )             33.6     07-11    135  |  101  |  29<H>  ----------------------------<  148<H>  4.7   |  23  |  1.11    Ca    9.0      11 Jul 2023 10:42    TPro  6.4  /  Alb  3.8  /  TBili  0.7  /  DBili  x   /  AST  14  /  ALT  9<L>  /  AlkPhos  50  07-11      PT/INR - ( 11 Jul 2023 10:42 )   PT: 16.3 sec;   INR: 1.40 ratio         PTT - ( 11 Jul 2023 10:42 )  PTT:32.6 sec  Urinalysis Basic - ( 11 Jul 2023 10:42 )    Color: x / Appearance: x / SG: x / pH: x  Gluc: 148 mg/dL / Ketone: x  / Bili: x / Urobili: x   Blood: x / Protein: x / Nitrite: x   Leuk Esterase: x / RBC: x / WBC x   Sq Epi: x / Non Sq Epi: x / Bacteria: x      CAPILLARY BLOOD GLUCOSE      POCT Blood Glucose.: 183 mg/dL (11 Jul 2023 21:38)  POCT Blood Glucose.: 140 mg/dL (11 Jul 2023 19:31)  POCT Blood Glucose.: 132 mg/dL (11 Jul 2023 11:48)  POCT Blood Glucose.: 121 mg/dL (11 Jul 2023 07:40)        Urinalysis Basic - ( 11 Jul 2023 10:42 )    Color: x / Appearance: x / SG: x / pH: x  Gluc: 148 mg/dL / Ketone: x  / Bili: x / Urobili: x   Blood: x / Protein: x / Nitrite: x   Leuk Esterase: x / RBC: x / WBC x   Sq Epi: x / Non Sq Epi: x / Bacteria: x        RADIOLOGY & ADDITIONAL TESTS:    Imaging Personally Reviewed:  [ ] YES  [ ] NO    HEALTH ISSUES - PROBLEM Dx:  Transplanted heart    Nicole syndrome    Receiving intravenous antibiotic treatment at home    Chronic atrial fibrillation    Hypertension    Chronic kidney disease (CKD)    Encounter for deep vein thrombosis (DVT) prophylaxis    Type 2 diabetes mellitus    Systemic inflammatory response syndrome (SIRS)

## 2023-07-12 NOTE — PROGRESS NOTE ADULT - PROBLEM SELECTOR PLAN 3
Follows with Dr. Rosario. As an outpatient, discussed possibly slow wean prednisone.  - hematology following, appreciate recs  - will trend hemolysis markers daily: LDH, reticulocyte count, CMP, phos, fractionated direct and indirect bilirubin, haptoglobin  - Hb currently at baseline (~9)  - c/w Prednisone 30mg QD  - continue protonix for GI protection on prednisone  - continue bactrim for PJP ppx Follows with Dr. Rosario. As an outpatient, discussed possibly slow wean prednisone.   - hematology following, appreciate recs  - will trend hemolysis markers daily: LDH, reticulocyte count, CMP, phos, fractionated direct and indirect bilirubin, haptoglobin  - Hb stable this admission at ~9  - c/w Prednisone 30mg QD  - continue protonix for GI protection on prednisone  - continue bactrim for PJP ppx - increased dose to bactrim DS TID (dose appears to have been reduced on prior admission for renal function, will monitor for hyperkalemia at increased dose)

## 2023-07-12 NOTE — PROGRESS NOTE ADULT - SUBJECTIVE AND OBJECTIVE BOX
INFECTIOUS DISEASES FOLLOW UP-- Sujey Lujan  [phone]    This is a follow up note for this  73yMale with  Anemia    feels 'okay'  no new complaints        ROS:  CONSTITUTIONAL:  No fever, good appetite  CARDIOVASCULAR:  No chest pain or palpitations  RESPIRATORY:  No dyspnea  GASTROINTESTINAL:  No nausea, vomiting, diarrhea, or abdominal pain  GENITOURINARY:  No dysuria  NEUROLOGIC:  No headache,     Allergies    No Known Allergies    Intolerances        ANTIBIOTICS/RELEVANT:  antimicrobials  meropenem  IVPB      meropenem  IVPB 500 milliGRAM(s) IV Intermittent every 8 hours  nystatin    Suspension 268378 Unit(s) Oral three times a day  trimethoprim  160 mG/sulfamethoxazole 800 mG 1 Tablet(s) Oral <User Schedule>  valGANciclovir 450 milliGRAM(s) Oral <User Schedule>    immunologic:  tacrolimus 3 milliGRAM(s) Oral two times a day    OTHER:  acetaminophen     Tablet .. 650 milliGRAM(s) Oral every 6 hours PRN  apixaban 5 milliGRAM(s) Oral every 12 hours  aspirin  chewable 81 milliGRAM(s) Oral daily  atorvastatin 10 milliGRAM(s) Oral at bedtime  buMETAnide 1 milliGRAM(s) Oral daily  dextrose 5%. 1000 milliLiter(s) IV Continuous <Continuous>  dextrose 5%. 1000 milliLiter(s) IV Continuous <Continuous>  dextrose 50% Injectable 25 Gram(s) IV Push once  dextrose 50% Injectable 12.5 Gram(s) IV Push once  dextrose 50% Injectable 25 Gram(s) IV Push once  dextrose Oral Gel 15 Gram(s) Oral once PRN  glucagon  Injectable 1 milliGRAM(s) IntraMuscular once  insulin lispro (ADMELOG) corrective regimen sliding scale   SubCutaneous three times a day before meals  insulin lispro (ADMELOG) corrective regimen sliding scale   SubCutaneous at bedtime  latanoprost 0.005% Ophthalmic Solution 1 Drop(s) Both EYES at bedtime  melatonin 3 milliGRAM(s) Oral at bedtime PRN  metoprolol succinate  milliGRAM(s) Oral daily  pantoprazole    Tablet 40 milliGRAM(s) Oral before breakfast  predniSONE   Tablet 10 milliGRAM(s) Oral three times a day      Objective:  Vital Signs Last 24 Hrs  T(C): 36.5 (12 Jul 2023 16:31), Max: 36.5 (12 Jul 2023 00:20)  T(F): 97.7 (12 Jul 2023 16:31), Max: 97.7 (12 Jul 2023 00:20)  HR: 111 (12 Jul 2023 16:31) (101 - 111)  BP: 124/85 (12 Jul 2023 16:31) (118/83 - 124/85)  BP(mean): --  RR: 18 (12 Jul 2023 16:31) (18 - 18)  SpO2: 99% (12 Jul 2023 16:31) (96% - 100%)    Parameters below as of 12 Jul 2023 16:31  Patient On (Oxygen Delivery Method): room air        PHYSICAL EXAM:  Constitutional:no acute distress  Eyes:WALT, EOMI  Ear/Nose/Throat: no oral lesions, 	  Respiratory: clear BL  Cardiovascular: S1S2  Gastrointestinal:soft, (+) BS, no tenderness  Extremities:no e/e/c  No Lymphadenopathy  IV sites not inflammed.    LABS:                        9.8    13.60 )-----------( 273      ( 12 Jul 2023 07:06 )             33.9     07-12    137  |  101  |  42<H>  ----------------------------<  123<H>  4.6   |  19<L>  |  1.89<H>    Ca    9.1      12 Jul 2023 16:26  Phos  4.2     07-12  Mg     2.1     07-12    TPro  6.3  /  Alb  3.9  /  TBili  0.6  /  DBili  0.1  /  AST  12  /  ALT  7<L>  /  AlkPhos  48  07-12    PT/INR - ( 11 Jul 2023 10:42 )   PT: 16.3 sec;   INR: 1.40 ratio         PTT - ( 11 Jul 2023 10:42 )  PTT:32.6 sec  Urinalysis Basic - ( 12 Jul 2023 16:26 )    Color: x / Appearance: x / SG: x / pH: x  Gluc: 123 mg/dL / Ketone: x  / Bili: x / Urobili: x   Blood: x / Protein: x / Nitrite: x   Leuk Esterase: x / RBC: x / WBC x   Sq Epi: x / Non Sq Epi: x / Bacteria: x        MICROBIOLOGY:            RECENT CULTURES:  07-11 @ 10:32  .Blood Blood-Peripheral  Blood Culture PCR  Blood Culture PCR  PCR    Growth in aerobic bottle: Staphylococcus saprophyticus  Coagulase Negative Staphylococci isolated from a single blood culture set  may represent contamination.  Contact the Microbiology Department at [phone] if susceptibility  testing is clinically indicated.  Direct identification is available within approximately 3-5  hours either by Blood Panel Multiplexed PCR or Direct  MALDI-TOF. Details: [email or web address]  --  07-11 @ 10:29  .Blood Blood-Peripheral  --  --  --    No growth at 24 hours  --  07-10 @ 17:19  Clean Catch Clean Catch (Midstream)  --  --  --    <10,000 CFU/mL Normal Urogenital [place]  --  07-10 @ 15:38  .Blood Blood-Peripheral  --  --  --    No growth at 48 Hours  --      RADIOLOGY & ADDITIONAL STUDIES:    < from: CT Chest w/ IV Cont (07.11.23 @ 08:57) >    IMPRESSION:  *  Elevation of the right hemidiaphragm with right lower lobe   consolidation consistent with pneumonia and/or atelectasis, similar to   findings present on prior exam.  *  No evidence of acute abnormality in the abdomen and pelvis.    < end of copied text >

## 2023-07-12 NOTE — PROGRESS NOTE ADULT - PROBLEM SELECTOR PLAN 1
Immunosuppression  - c/w tacro 2mg BID;   - Please send daily Tacro level 30MINS before AM dose; goal tacro level 4-6,   PPPx:  Currently on Valcyte and bactrim   - heartcare normal 5/17, DSA pending   - Will have transplant SW team follow up to establish plan for Safe discharge when ready as he WILL NOT be Safe to going back to prior rehab facility Immunosuppression  - c/w tacro 2mg BID;   - Please send daily Tacro level 30MINS before AM dose; goal tacro level 4-6,   PPPx:  Currently on Valcyte and bactrim   - heartcare normal 5/17, DSA pending   - Will have transplant SW team follow up to establish plan for Safe discharge when ready as he WILL NOT be Safe to going back to prior rehab facility  -Please Consult Psych for behavioral disturbances

## 2023-07-12 NOTE — PROGRESS NOTE ADULT - PROBLEM SELECTOR PLAN 3
- WBC 15; Bld Cx 7/10 x1 set +Staph ?contaminate  - CTA unrevealing. CT Chest ? atlectasis vs. PNA; Pending Bronch/BAL  - Appreciate Transplant ID recs.

## 2023-07-12 NOTE — PROGRESS NOTE ADULT - ASSESSMENT
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

## 2023-07-12 NOTE — PROGRESS NOTE ADULT - ATTENDING COMMENTS
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   Feeling well, denies any localizing symptoms. decreased BS at R base, +b/l LE edema L>R     - CT C/A/P with ?RLL consolidation, which was noted on prior imaging as well, ?PNA - pulm consulted for possible bronch  - f/u culture data   - ID following, will c/w meropenem for now   - c/w tacro 3mg BID, check levels in AM   - c/w prednisone 30mg for Nicole syndrome, hematology input appreciated   -  HF following, appears near euvolemia - c/w current bumex dose     #leukocytosis, SIRS  #s/p heart transplant   #Nicole syndrome  #T2DM   #chronic Afib 74 yo M w/ PMHx OHT 2/2018 (on tacrolimus) with a LAD-RV fistula and hx of graft dysfunction with DSAs treated with PLEX/IVIG/rituximab, 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   Feeling well, denies any localizing symptoms. decreased BS at R base, +b/l LE edema L>R     - CT C/A/P with ?RLL consolidation, which was noted on prior imaging as well, ?PNA - pulm consulted for possible bronch  - f/u culture data --> 1/2 Bcx +CoNS, suspect contamination, f/u ID   - ID following, will c/w meropenem for now   - c/w tacro 3mg BID, check levels in AM   - c/w prednisone 30mg for Nicole syndrome, hematology input appreciated   -  HF following, appears near euvolemia - c/w current bumex dose   - NORMAN with rise of Cr 1.1-->1.6, unclear etiology, check urine lytes     #leukocytosis, SIRS  #s/p heart transplant   #Nicole syndrome  #NORMAN  #T2DM   #chronic Afib

## 2023-07-12 NOTE — CONSULT NOTE ADULT - ATTENDING COMMENTS
This patient who has the complicated medical history including renal  transplantation ; noted above. 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 He is now seen for evaluation of elevated white cell count which may be secondary to steroid induced leucocytosis. We will recommend evaluation for occult infection now that he has been transferred to hospital from rehabilitation.
74 yo M w/ PMHx T2DM, OHT 2/2018 (on tacrolimus) with a LAD-RV fistula and hx of graft dysfunction with DSAs treated with PLEX/IVIG/rituximab, Nicole syndrome dx on 2022 (on prednisone), CKD IV, and post-transplant pAF/AFl (on Eliquis) and two recent hospital admissions for heart failure exacerbation (5/23-6/3) and hypoglycemia (6/14-6/23) presenting from rehab after being found to have leukocytosis and empirically started on meropenem on 07/08.  There is concern that he may have an underlying infection.    He appears well and denies dyspnea, fever or cough.  Review of Ct shows chronically elevated right hemidiaphragm with areal of atelectasis related to elevation. This has been present since 2020 and is improved compared with most recent CT in 3/2023.  This is not pneumonia.  Remaining lung fields are clear.  Agree with plan as outlined above.  Please reconsult as needed.

## 2023-07-12 NOTE — PROGRESS NOTE ADULT - PROBLEM SELECTOR PLAN 4
SCr 1.17 -> 1.11. On previous admission, SCr as high as 3.71, decreased to 1.74 by d/c.   -currently much improved since prior admission  - avoid nephrotoxic agents SCr 1.17 -> 1.11 -> 1.59. On previous admission, SCr as high as 3.71, decreased to 1.74 by d/c.   - unclear baseline, although appears to be developing some component of NORMAN  - trend SCr  - avoid nephrotoxic agents  - q12h BMP - s/p lokelma this AM for K 5.5

## 2023-07-12 NOTE — PROGRESS NOTE ADULT - PROBLEM SELECTOR PLAN 5
Pt takes admelog 7u tid and glargine 8u at bedtime    -ISS  -CC  -hold home insulin for now as blood glucose of 92 and hx of hypoglycemia Pt takes admelog 7u tid and glargine 8u at bedtime  - continuing home regimen, CBGs mostly 120s-180s this admission

## 2023-07-12 NOTE — CONSULT NOTE ADULT - SUBJECTIVE AND OBJECTIVE BOX
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 Per rehab records, pt was started on meropenem on 7/8. Pt stated that he feels fine and was feeling well at rehab. Although, he stated he did not like the rehab it was at and said it was "dirty." He denied fever, chills, weakness, neck pain, cough, wheezing, chest pain, abdominal pain, dysuria or urinary frequency.      contacted rehab facility for collateral info, requested to have records faxed. Per representative at [facility], patient had routine CBC and was found to have WBC 16- no culture data, no further workup, was started on meropenem empirically but no source of infection identified    Pulm was consulted for further infectious work up.     Pt denied any sob, cp, any fever, chills, or n/v/d.     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 Pt denied illicit drug or alcohol use.  His wife passed away years ago. No children    MEDICATIONS  (STANDING):  apixaban 5 milliGRAM(s) Oral every 12 hours  aspirin  chewable 81 milliGRAM(s) Oral daily  atorvastatin 10 milliGRAM(s) Oral at bedtime  buMETAnide 1 milliGRAM(s) Oral daily  dextrose 5%. 1000 milliLiter(s) (50 mL/Hr) IV Continuous <Continuous>  dextrose 5%. 1000 milliLiter(s) (100 mL/Hr) IV Continuous <Continuous>  dextrose 50% Injectable 25 Gram(s) IV Push once  dextrose 50% Injectable 12.5 Gram(s) IV Push once  dextrose 50% Injectable 25 Gram(s) IV Push once  glucagon  Injectable 1 milliGRAM(s) IntraMuscular once  insulin lispro (ADMELOG) corrective regimen sliding scale   SubCutaneous three times a day before meals  insulin lispro (ADMELOG) corrective regimen sliding scale   SubCutaneous at bedtime  latanoprost 0.005% Ophthalmic Solution 1 Drop(s) Both EYES at bedtime  meropenem  IVPB      meropenem  IVPB 500 milliGRAM(s) IV Intermittent every 8 hours  metoprolol succinate  milliGRAM(s) Oral daily  nystatin    Suspension 027318 Unit(s) Oral three times a day  pantoprazole    Tablet 40 milliGRAM(s) Oral before breakfast  predniSONE   Tablet 10 milliGRAM(s) Oral three times a day  tacrolimus 3 milliGRAM(s) Oral two times a day  trimethoprim   80 mG/sulfamethoxazole 400 mG 1 Tablet(s) Oral <User Schedule>  valGANciclovir 450 milliGRAM(s) Oral <User Schedule>    MEDICATIONS  (PRN):  acetaminophen     Tablet .. 650 milliGRAM(s) Oral every 6 hours PRN Temp greater or equal to 38C (100.4F), Mild Pain (1 - 3)  dextrose Oral Gel 15 Gram(s) Oral once PRN Blood Glucose LESS THAN 70 milliGRAM(s)/deciliter  melatonin 3 milliGRAM(s) Oral at bedtime PRN Insomnia    Allergies  No Known Allergies  Intolerances    OBJECTIVE:  ICU Vital Signs Last 24 Hrs  T(C): 36.3 (12 Jul 2023 09:27), Max: 36.5 (11 Jul 2023 16:15)  T(F): 97.3 (12 Jul 2023 09:27), Max: 97.7 (11 Jul 2023 16:15)  HR: 101 (12 Jul 2023 09:27) (101 - 108)  BP: 118/83 (12 Jul 2023 09:27) (111/79 - 121/77)  BP(mean): --  ABP: --  ABP(mean): --  RR: 18 (12 Jul 2023 09:27) (18 - 18)  SpO2: 100% (12 Jul 2023 09:27) (96% - 100%)    O2 Parameters below as of 12 Jul 2023 09:27  Patient On (Oxygen Delivery Method): room air    07-11 @ 07:01  -  07-12 @ 07:00  --------------------------------------------------------  IN: 0 mL / OUT: 400 mL / NET: -400 mL    CAPILLARY BLOOD GLUCOSE    POCT Blood Glucose.: 136 mg/dL (12 Jul 2023 08:08)    PHYSICAL EXAM:  GENERAL: NAD, lying in bed comfortably  HEAD:  Atraumatic, normocephalic  EYES: EOMI, PERRLA, conjunctiva and sclera clear  ENT: Moist mucous membranes  NECK: Supple, no JVD  HEART: S1, S2, regular rate and rhythm, no murmurs, rubs, or gallops  LUNGS: Unlabored respirations.  Clear to auscultation bilaterally, no crackles, wheezing, or rhonchi  ABDOMEN: Soft, nontender, nondistended, +BS  EXTREMITIES: 2+ peripheral pulses bilaterally. No clubbing, cyanosis, or edema  NERVOUS SYSTEM:  A&Ox3, no focal deficits   SKIN: No rashes or lesions  LINES:     LABS:                        9.8    13.60 )-----------( 273      ( 12 Jul 2023 07:06 )             33.9     Hgb Trend: 9.8<--, 10.0<--, 9.9<--  07-12    140  |  102  |  39<H>  ----------------------------<  132<H>  5.5<H>   |  26  |  1.59<H>    Ca    9.0      12 Jul 2023 07:04  Phos  4.2     07-12  Mg     2.3     07-12    TPro  6.3  /  Alb  3.9  /  TBili  0.6  /  DBili  0.1  /  AST  12  /  ALT  7<L>  /  AlkPhos  48  07-12    Creatinine Trend: 1.59<--, 1.11<--, 1.17<--, 1.75<--, 2.04<--, 1.76<--  PT/INR - ( 11 Jul 2023 10:42 )   PT: 16.3 sec;   INR: 1.40 ratio         PTT - ( 11 Jul 2023 10:42 )  PTT:32.6 sec  Urinalysis Basic - ( 12 Jul 2023 07:04 )    Color: x / Appearance: x / SG: x / pH: x  Gluc: 132 mg/dL / Ketone: x  / Bili: x / Urobili: x   Blood: x / Protein: x / Nitrite: x   Leuk Esterase: x / RBC: x / WBC x   Sq Epi: x / Non Sq Epi: x / Bacteria: x        Venous Blood Gas:  07-10 @ 13:49  7.34/45/65/24/91.1  VBG Lactate: 1.8      MICROBIOLOGY: f     RADIOLOGY & ADDITIONAL TESTS:       HPI:  74 yo M w/ PMHx T2DM, OHT 2/2018 (on tacrolimus) with a LAD-RV fistula and hx of graft dysfunction with DSAs treated with PLEX/IVIG/rituximab, Nicole syndrome dx on 2022 (on prednisone), CKD IV, and 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 patient had routine CBC and was found to have WBC 16- no culture data, no further workup, was started on meropenem empirically but no source of infection identified. Meropenem started on 7/8. Pt stated that he feels fine and was feeling well at rehab. Although, he stated he did not like the rehab it was at and said it was "dirty." he was found to have tachycardia and elevated WBC here on admission. he was breathing comfortable on RA.     Pt denied any sob, cough, sore throat, nasal congestion, cp, any fever, chills, or n/v/d.     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 Pt denied illicit drug or alcohol use.  His wife passed away years ago. No children    MEDICATIONS  (STANDING):  apixaban 5 milliGRAM(s) Oral every 12 hours  aspirin  chewable 81 milliGRAM(s) Oral daily  atorvastatin 10 milliGRAM(s) Oral at bedtime  buMETAnide 1 milliGRAM(s) Oral daily  dextrose 5%. 1000 milliLiter(s) (50 mL/Hr) IV Continuous <Continuous>  dextrose 5%. 1000 milliLiter(s) (100 mL/Hr) IV Continuous <Continuous>  dextrose 50% Injectable 25 Gram(s) IV Push once  dextrose 50% Injectable 12.5 Gram(s) IV Push once  dextrose 50% Injectable 25 Gram(s) IV Push once  glucagon  Injectable 1 milliGRAM(s) IntraMuscular once  insulin lispro (ADMELOG) corrective regimen sliding scale   SubCutaneous three times a day before meals  insulin lispro (ADMELOG) corrective regimen sliding scale   SubCutaneous at bedtime  latanoprost 0.005% Ophthalmic Solution 1 Drop(s) Both EYES at bedtime  meropenem  IVPB      meropenem  IVPB 500 milliGRAM(s) IV Intermittent every 8 hours  metoprolol succinate  milliGRAM(s) Oral daily  nystatin    Suspension 997309 Unit(s) Oral three times a day  pantoprazole    Tablet 40 milliGRAM(s) Oral before breakfast  predniSONE   Tablet 10 milliGRAM(s) Oral three times a day  tacrolimus 3 milliGRAM(s) Oral two times a day  trimethoprim   80 mG/sulfamethoxazole 400 mG 1 Tablet(s) Oral <User Schedule>  valGANciclovir 450 milliGRAM(s) Oral <User Schedule>    MEDICATIONS  (PRN):  acetaminophen     Tablet .. 650 milliGRAM(s) Oral every 6 hours PRN Temp greater or equal to 38C (100.4F), Mild Pain (1 - 3)  dextrose Oral Gel 15 Gram(s) Oral once PRN Blood Glucose LESS THAN 70 milliGRAM(s)/deciliter  melatonin 3 milliGRAM(s) Oral at bedtime PRN Insomnia    Allergies  No Known Allergies  Intolerances    OBJECTIVE:  ICU Vital Signs Last 24 Hrs  T(C): 36.3 (12 Jul 2023 09:27), Max: 36.5 (11 Jul 2023 16:15)  T(F): 97.3 (12 Jul 2023 09:27), Max: 97.7 (11 Jul 2023 16:15)  HR: 101 (12 Jul 2023 09:27) (101 - 108)  BP: 118/83 (12 Jul 2023 09:27) (111/79 - 121/77)  BP(mean): --  ABP: --  ABP(mean): --  RR: 18 (12 Jul 2023 09:27) (18 - 18)  SpO2: 100% (12 Jul 2023 09:27) (96% - 100%)    O2 Parameters below as of 12 Jul 2023 09:27  Patient On (Oxygen Delivery Method): room air    07-11 @ 07:01  -  07-12 @ 07:00  --------------------------------------------------------  IN: 0 mL / OUT: 400 mL / NET: -400 mL    CAPILLARY BLOOD GLUCOSE    POCT Blood Glucose.: 136 mg/dL (12 Jul 2023 08:08)    PHYSICAL EXAM:  GENERAL: NAD, lying in bed comfortably  HEAD:  Atraumatic, normocephalic  EYES: EOMI, PERRLA, conjunctiva and sclera clear  ENT: Moist mucous membranes  NECK: Supple, no JVD  HEART: S1, S2, regular rate and rhythm, no murmurs, rubs, or gallops  LUNGS: Unlabored respirations.  Clear to auscultation bilaterally, no crackles, wheezing, or rhonchi  ABDOMEN: Soft, nontender, nondistended, +BS  EXTREMITIES: 2+ peripheral pulses bilaterally. No clubbing, cyanosis, or edema  NERVOUS SYSTEM:  A&Ox3, no focal deficits   SKIN: No rashes or lesions  LINES:     LABS:                        9.8    13.60 )-----------( 273      ( 12 Jul 2023 07:06 )             33.9     Hgb Trend: 9.8<--, 10.0<--, 9.9<--  07-12    140  |  102  |  39<H>  ----------------------------<  132<H>  5.5<H>   |  26  |  1.59<H>    Ca    9.0      12 Jul 2023 07:04  Phos  4.2     07-12  Mg     2.3     07-12    TPro  6.3  /  Alb  3.9  /  TBili  0.6  /  DBili  0.1  /  AST  12  /  ALT  7<L>  /  AlkPhos  48  07-12    Creatinine Trend: 1.59<--, 1.11<--, 1.17<--, 1.75<--, 2.04<--, 1.76<--  PT/INR - ( 11 Jul 2023 10:42 )   PT: 16.3 sec;   INR: 1.40 ratio         PTT - ( 11 Jul 2023 10:42 )  PTT:32.6 sec  Urinalysis Basic - ( 12 Jul 2023 07:04 )    Color: x / Appearance: x / SG: x / pH: x  Gluc: 132 mg/dL / Ketone: x  / Bili: x / Urobili: x   Blood: x / Protein: x / Nitrite: x   Leuk Esterase: x / RBC: x / WBC x   Sq Epi: x / Non Sq Epi: x / Bacteria: x        Venous Blood Gas:  07-10 @ 13:49  7.34/45/65/24/91.1  VBG Lactate: 1.8      MICROBIOLOGY:   Culture - Blood (07.11.23 @ 10:32)    -  Coagulase negative Staphylococcus: Detec   Gram Stain:   Growth in aerobic bottle: Gram Positive Cocci in Clusters   Specimen Source: .Blood Blood-Peripheral   Organism: Blood Culture PCR   Culture Results:   Growth in aerobic bottle: Gram Positive Cocci in Clusters  Direct identification is available within approximately 3-5  hours either by Blood Panel Multiplexed PCR or Direct  MALDI-TOF. Details: [email or web address]   Organism Identification: Blood Culture PCR   Method Type: PCR      RADIOLOGY & ADDITIONAL TESTS:  < from: CT Chest w/ IV Cont (07.11.23 @ 08:57) >  IMPRESSION:  *  Elevation of the right hemidiaphragm with right lower lobe   consolidation consistent with pneumonia and/or atelectasis, similar to   findings present on prior exam.  *  No evidence of acute abnormality in the abdomen and pelvis.        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 patient had routine CBC and was found to have WBC 16- no culture data, no further workup, was started on meropenem empirically but no source of infection identified. Meropenem started on 7/8. Pt stated that he feels fine and was feeling well at rehab. Although, he stated he did not like the rehab it was at and said it was "dirty." he was found to have tachycardia and elevated WBC here on admission. he was breathing comfortable on RA.      Pt denied any sob, cough, sore throat, nasal congestion, cp, any fever, chills, or n/v/d. 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  More recent admissions have been related to an episode of viral pneumonia. He also has PPD skin test done in his right forearm.     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 Pt denied illicit drug or alcohol use.  His wife passed away years ago. No children    MEDICATIONS  (STANDING):  apixaban 5 milliGRAM(s) Oral every 12 hours  aspirin  chewable 81 milliGRAM(s) Oral daily  atorvastatin 10 milliGRAM(s) Oral at bedtime  buMETAnide 1 milliGRAM(s) Oral daily  dextrose 5%. 1000 milliLiter(s) (50 mL/Hr) IV Continuous <Continuous>  dextrose 5%. 1000 milliLiter(s) (100 mL/Hr) IV Continuous <Continuous>  dextrose 50% Injectable 25 Gram(s) IV Push once  dextrose 50% Injectable 12.5 Gram(s) IV Push once  dextrose 50% Injectable 25 Gram(s) IV Push once  glucagon  Injectable 1 milliGRAM(s) IntraMuscular once  insulin lispro (ADMELOG) corrective regimen sliding scale   SubCutaneous three times a day before meals  insulin lispro (ADMELOG) corrective regimen sliding scale   SubCutaneous at bedtime  latanoprost 0.005% Ophthalmic Solution 1 Drop(s) Both EYES at bedtime  meropenem  IVPB      meropenem  IVPB 500 milliGRAM(s) IV Intermittent every 8 hours  metoprolol succinate  milliGRAM(s) Oral daily  nystatin    Suspension 351716 Unit(s) Oral three times a day  pantoprazole    Tablet 40 milliGRAM(s) Oral before breakfast  predniSONE   Tablet 10 milliGRAM(s) Oral three times a day  tacrolimus 3 milliGRAM(s) Oral two times a day  trimethoprim   80 mG/sulfamethoxazole 400 mG 1 Tablet(s) Oral <User Schedule>  valGANciclovir 450 milliGRAM(s) Oral <User Schedule>    MEDICATIONS  (PRN):  acetaminophen     Tablet .. 650 milliGRAM(s) Oral every 6 hours PRN Temp greater or equal to 38C (100.4F), Mild Pain (1 - 3)  dextrose Oral Gel 15 Gram(s) Oral once PRN Blood Glucose LESS THAN 70 milliGRAM(s)/deciliter  melatonin 3 milliGRAM(s) Oral at bedtime PRN Insomnia    Allergies  No Known Allergies  Intolerances    OBJECTIVE:  ICU Vital Signs Last 24 Hrs  T(C): 36.3 (12 Jul 2023 09:27), Max: 36.5 (11 Jul 2023 16:15)  T(F): 97.3 (12 Jul 2023 09:27), Max: 97.7 (11 Jul 2023 16:15)  HR: 101 (12 Jul 2023 09:27) (101 - 108)  BP: 118/83 (12 Jul 2023 09:27) (111/79 - 121/77)  BP(mean): --  ABP: --  ABP(mean): --  RR: 18 (12 Jul 2023 09:27) (18 - 18)  SpO2: 100% (12 Jul 2023 09:27) (96% - 100%)    O2 Parameters below as of 12 Jul 2023 09:27  Patient On (Oxygen Delivery Method): room air    07-11 @ 07:01  -  07-12 @ 07:00  --------------------------------------------------------  IN: 0 mL / OUT: 400 mL / NET: -400 mL    CAPILLARY BLOOD GLUCOSE    POCT Blood Glucose.: 136 mg/dL (12 Jul 2023 08:08)    PHYSICAL EXAM:  GENERAL: NAD, lying in bed comfortably  HEAD:  Atraumatic, normocephalic  EYES: EOMI, PERRLA, conjunctiva and sclera clear  ENT: Moist mucous membranes  NECK: Supple, no JVD  HEART: S1, S2, regular rate and rhythm, no murmurs, rubs, or gallops  LUNGS: Unlabored respirations.  Clear to auscultation bilaterally, no crackles, wheezing, or rhonchi  ABDOMEN: Soft, nontender, nondistended, +BS  EXTREMITIES: 2+ peripheral pulses bilaterally. No clubbing, cyanosis, or edema  NERVOUS SYSTEM:  A&Ox3, no focal deficits   SKIN: No rashes or lesions  LINES:     LABS:                        9.8    13.60 )-----------( 273      ( 12 Jul 2023 07:06 )             33.9     Hgb Trend: 9.8<--, 10.0<--, 9.9<--  07-12    140  |  102  |  39<H>  ----------------------------<  132<H>  5.5<H>   |  26  |  1.59<H>    Ca    9.0      12 Jul 2023 07:04  Phos  4.2     07-12  Mg     2.3     07-12    TPro  6.3  /  Alb  3.9  /  TBili  0.6  /  DBili  0.1  /  AST  12  /  ALT  7<L>  /  AlkPhos  48  07-12    Creatinine Trend: 1.59<--, 1.11<--, 1.17<--, 1.75<--, 2.04<--, 1.76<--  PT/INR - ( 11 Jul 2023 10:42 )   PT: 16.3 sec;   INR: 1.40 ratio         PTT - ( 11 Jul 2023 10:42 )  PTT:32.6 sec  Urinalysis Basic - ( 12 Jul 2023 07:04 )    Color: x / Appearance: x / SG: x / pH: x  Gluc: 132 mg/dL / Ketone: x  / Bili: x / Urobili: x   Blood: x / Protein: x / Nitrite: x   Leuk Esterase: x / RBC: x / WBC x   Sq Epi: x / Non Sq Epi: x / Bacteria: x        Venous Blood Gas:  07-10 @ 13:49  7.34/45/65/24/91.1  VBG Lactate: 1.8      MICROBIOLOGY:   Culture - Blood (07.11.23 @ 10:32)    -  Coagulase negative Staphylococcus: Detec   Gram Stain:   Growth in aerobic bottle: Gram Positive Cocci in Clusters   Specimen Source: .Blood Blood-Peripheral   Organism: Blood Culture PCR   Culture Results:   Growth in aerobic bottle: Gram Positive Cocci in Clusters  Direct identification is available within approximately 3-5  hours either by Blood Panel Multiplexed PCR or Direct  MALDI-TOF. Details: [email or web address]   Organism Identification: Blood Culture PCR   Method Type: PCR      RADIOLOGY & ADDITIONAL TESTS:  < from: CT Chest w/ IV Cont (07.11.23 @ 08:57) >  IMPRESSION:  *  Elevation of the right hemidiaphragm with right lower lobe   consolidation consistent with pneumonia and/or atelectasis, similar to   findings present on prior exam.  *  No evidence of acute abnormality in the abdomen and pelvis.        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 patient had routine CBC and was found to have WBC 16- no culture data, no further workup, was started on meropenem empirically but no source of infection identified. Meropenem started on 7/8. Pt stated that he feels fine and was feeling well at rehab. Although, he stated he did not like the rehab it was at and said it was "dirty." he was found to have tachycardia and elevated WBC here on admission. he was breathing comfortable on RA.      Pt denied any sob, cough, sore throat, nasal congestion, cp, any fever, chills, or n/v/d. 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  More recent admissions have been related to an episode of viral hMPV pneumonia. 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 He also had CT guided lung biopsy and bronchoscopy in the past which are unremarkable. He has PPD skin test done in his right forearm.     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 Pt denied illicit drug or alcohol use.  His wife passed away years ago. No children    MEDICATIONS  (STANDING):  apixaban 5 milliGRAM(s) Oral every 12 hours  aspirin  chewable 81 milliGRAM(s) Oral daily  atorvastatin 10 milliGRAM(s) Oral at bedtime  buMETAnide 1 milliGRAM(s) Oral daily  dextrose 5%. 1000 milliLiter(s) (50 mL/Hr) IV Continuous <Continuous>  dextrose 5%. 1000 milliLiter(s) (100 mL/Hr) IV Continuous <Continuous>  dextrose 50% Injectable 25 Gram(s) IV Push once  dextrose 50% Injectable 12.5 Gram(s) IV Push once  dextrose 50% Injectable 25 Gram(s) IV Push once  glucagon  Injectable 1 milliGRAM(s) IntraMuscular once  insulin lispro (ADMELOG) corrective regimen sliding scale   SubCutaneous three times a day before meals  insulin lispro (ADMELOG) corrective regimen sliding scale   SubCutaneous at bedtime  latanoprost 0.005% Ophthalmic Solution 1 Drop(s) Both EYES at bedtime  meropenem  IVPB      meropenem  IVPB 500 milliGRAM(s) IV Intermittent every 8 hours  metoprolol succinate  milliGRAM(s) Oral daily  nystatin    Suspension 281140 Unit(s) Oral three times a day  pantoprazole    Tablet 40 milliGRAM(s) Oral before breakfast  predniSONE   Tablet 10 milliGRAM(s) Oral three times a day  tacrolimus 3 milliGRAM(s) Oral two times a day  trimethoprim   80 mG/sulfamethoxazole 400 mG 1 Tablet(s) Oral <User Schedule>  valGANciclovir 450 milliGRAM(s) Oral <User Schedule>    MEDICATIONS  (PRN):  acetaminophen     Tablet .. 650 milliGRAM(s) Oral every 6 hours PRN Temp greater or equal to 38C (100.4F), Mild Pain (1 - 3)  dextrose Oral Gel 15 Gram(s) Oral once PRN Blood Glucose LESS THAN 70 milliGRAM(s)/deciliter  melatonin 3 milliGRAM(s) Oral at bedtime PRN Insomnia    Allergies  No Known Allergies  Intolerances    OBJECTIVE:  ICU Vital Signs Last 24 Hrs  T(C): 36.3 (12 Jul 2023 09:27), Max: 36.5 (11 Jul 2023 16:15)  T(F): 97.3 (12 Jul 2023 09:27), Max: 97.7 (11 Jul 2023 16:15)  HR: 101 (12 Jul 2023 09:27) (101 - 108)  BP: 118/83 (12 Jul 2023 09:27) (111/79 - 121/77)  BP(mean): --  ABP: --  ABP(mean): --  RR: 18 (12 Jul 2023 09:27) (18 - 18)  SpO2: 100% (12 Jul 2023 09:27) (96% - 100%)    O2 Parameters below as of 12 Jul 2023 09:27  Patient On (Oxygen Delivery Method): room air    07-11 @ 07:01  -  07-12 @ 07:00  --------------------------------------------------------  IN: 0 mL / OUT: 400 mL / NET: -400 mL    CAPILLARY BLOOD GLUCOSE    POCT Blood Glucose.: 136 mg/dL (12 Jul 2023 08:08)    PHYSICAL EXAM:  GENERAL: NAD, lying in bed comfortably  HEAD:  Atraumatic, normocephalic  EYES: EOMI, PERRLA, conjunctiva and sclera clear  ENT: Moist mucous membranes  NECK: Supple, no JVD  HEART: S1, S2, regular rate and rhythm, no murmurs, rubs, or gallops  LUNGS: Unlabored respirations.  Clear to auscultation bilaterally, no crackles, wheezing, or rhonchi  ABDOMEN: Soft, nontender, nondistended, +BS  EXTREMITIES: 2+ peripheral pulses bilaterally. No clubbing, cyanosis, or edema  NERVOUS SYSTEM:  A&Ox3, no focal deficits   SKIN: No rashes or lesions  LINES:     LABS:                        9.8    13.60 )-----------( 273      ( 12 Jul 2023 07:06 )             33.9     Hgb Trend: 9.8<--, 10.0<--, 9.9<--  07-12    140  |  102  |  39<H>  ----------------------------<  132<H>  5.5<H>   |  26  |  1.59<H>    Ca    9.0      12 Jul 2023 07:04  Phos  4.2     07-12  Mg     2.3     07-12    TPro  6.3  /  Alb  3.9  /  TBili  0.6  /  DBili  0.1  /  AST  12  /  ALT  7<L>  /  AlkPhos  48  07-12    Creatinine Trend: 1.59<--, 1.11<--, 1.17<--, 1.75<--, 2.04<--, 1.76<--  PT/INR - ( 11 Jul 2023 10:42 )   PT: 16.3 sec;   INR: 1.40 ratio         PTT - ( 11 Jul 2023 10:42 )  PTT:32.6 sec  Urinalysis Basic - ( 12 Jul 2023 07:04 )    Color: x / Appearance: x / SG: x / pH: x  Gluc: 132 mg/dL / Ketone: x  / Bili: x / Urobili: x   Blood: x / Protein: x / Nitrite: x   Leuk Esterase: x / RBC: x / WBC x   Sq Epi: x / Non Sq Epi: x / Bacteria: x        Venous Blood Gas:  07-10 @ 13:49  7.34/45/65/24/91.1  VBG Lactate: 1.8      MICROBIOLOGY:   Culture - Blood (07.11.23 @ 10:32)    -  Coagulase negative Staphylococcus: Detec   Gram Stain:   Growth in aerobic bottle: Gram Positive Cocci in Clusters   Specimen Source: .Blood Blood-Peripheral   Organism: Blood Culture PCR   Culture Results:   Growth in aerobic bottle: Gram Positive Cocci in Clusters  Direct identification is available within approximately 3-5  hours either by Blood Panel Multiplexed PCR or Direct  MALDI-TOF. Details: [email or web address]   Organism Identification: Blood Culture PCR   Method Type: PCR      RADIOLOGY & ADDITIONAL TESTS:  < from: CT Chest w/ IV Cont (07.11.23 @ 08:57) >  IMPRESSION:  *  Elevation of the right hemidiaphragm with right lower lobe   consolidation consistent with pneumonia and/or atelectasis, similar to   findings present on prior exam.  *  No evidence of acute abnormality in the abdomen and pelvis.        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 Patient had routine CBC and was found to have WBC 16- no culture data, no further workup, was started on meropenem empirically but no source of infection identified. Meropenem started on 7/8. Pt stated that he feels fine and was feeling well at rehab. Although, he stated he did not like the rehab it was at and said it was "dirty." he was found to have tachycardia and elevated WBC here on admission. He was breathing comfortable on RA.      Pt denied any sob, cough, sore throat, nasal congestion, cp, any fever, chills, or n/v/d. 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  More recent admissions have been related to an episode of viral hMPV pneumonia. 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 He also had CT guided lung biopsy and bronchoscopy in the past which are unremarkable. He has PPD skin test done in his right forearm.     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 Pt denied illicit drug or alcohol use.  His wife passed away years ago. No children    MEDICATIONS  (STANDING):  apixaban 5 milliGRAM(s) Oral every 12 hours  aspirin  chewable 81 milliGRAM(s) Oral daily  atorvastatin 10 milliGRAM(s) Oral at bedtime  buMETAnide 1 milliGRAM(s) Oral daily  dextrose 5%. 1000 milliLiter(s) (50 mL/Hr) IV Continuous <Continuous>  dextrose 5%. 1000 milliLiter(s) (100 mL/Hr) IV Continuous <Continuous>  dextrose 50% Injectable 25 Gram(s) IV Push once  dextrose 50% Injectable 12.5 Gram(s) IV Push once  dextrose 50% Injectable 25 Gram(s) IV Push once  glucagon  Injectable 1 milliGRAM(s) IntraMuscular once  insulin lispro (ADMELOG) corrective regimen sliding scale   SubCutaneous three times a day before meals  insulin lispro (ADMELOG) corrective regimen sliding scale   SubCutaneous at bedtime  latanoprost 0.005% Ophthalmic Solution 1 Drop(s) Both EYES at bedtime  meropenem  IVPB      meropenem  IVPB 500 milliGRAM(s) IV Intermittent every 8 hours  metoprolol succinate  milliGRAM(s) Oral daily  nystatin    Suspension 339724 Unit(s) Oral three times a day  pantoprazole    Tablet 40 milliGRAM(s) Oral before breakfast  predniSONE   Tablet 10 milliGRAM(s) Oral three times a day  tacrolimus 3 milliGRAM(s) Oral two times a day  trimethoprim   80 mG/sulfamethoxazole 400 mG 1 Tablet(s) Oral <User Schedule>  valGANciclovir 450 milliGRAM(s) Oral <User Schedule>    MEDICATIONS  (PRN):  acetaminophen     Tablet .. 650 milliGRAM(s) Oral every 6 hours PRN Temp greater or equal to 38C (100.4F), Mild Pain (1 - 3)  dextrose Oral Gel 15 Gram(s) Oral once PRN Blood Glucose LESS THAN 70 milliGRAM(s)/deciliter  melatonin 3 milliGRAM(s) Oral at bedtime PRN Insomnia    Allergies  No Known Allergies  Intolerances    OBJECTIVE:  ICU Vital Signs Last 24 Hrs  T(C): 36.3 (12 Jul 2023 09:27), Max: 36.5 (11 Jul 2023 16:15)  T(F): 97.3 (12 Jul 2023 09:27), Max: 97.7 (11 Jul 2023 16:15)  HR: 101 (12 Jul 2023 09:27) (101 - 108)  BP: 118/83 (12 Jul 2023 09:27) (111/79 - 121/77)  BP(mean): --  ABP: --  ABP(mean): --  RR: 18 (12 Jul 2023 09:27) (18 - 18)  SpO2: 100% (12 Jul 2023 09:27) (96% - 100%)    O2 Parameters below as of 12 Jul 2023 09:27  Patient On (Oxygen Delivery Method): room air    07-11 @ 07:01  -  07-12 @ 07:00  --------------------------------------------------------  IN: 0 mL / OUT: 400 mL / NET: -400 mL    CAPILLARY BLOOD GLUCOSE    POCT Blood Glucose.: 136 mg/dL (12 Jul 2023 08:08)    PHYSICAL EXAM:  GENERAL: NAD, lying in bed comfortably  HEAD:  Atraumatic, normocephalic  EYES: EOMI, PERRLA, conjunctiva and sclera clear  ENT: Moist mucous membranes  NECK: Supple, no JVD  HEART: S1, S2, regular rate and rhythm, no murmurs, rubs, or gallops  LUNGS: Unlabored respirations.  Clear to auscultation bilaterally, no crackles, wheezing, or rhonchi  ABDOMEN: Soft, nontender, nondistended, +BS  EXTREMITIES: 2+ peripheral pulses bilaterally. No clubbing, cyanosis, or edema  NERVOUS SYSTEM:  A&Ox3, no focal deficits   SKIN: No rashes or lesions  LINES:     LABS:                        9.8    13.60 )-----------( 273      ( 12 Jul 2023 07:06 )             33.9     Hgb Trend: 9.8<--, 10.0<--, 9.9<--  07-12    140  |  102  |  39<H>  ----------------------------<  132<H>  5.5<H>   |  26  |  1.59<H>    Ca    9.0      12 Jul 2023 07:04  Phos  4.2     07-12  Mg     2.3     07-12    TPro  6.3  /  Alb  3.9  /  TBili  0.6  /  DBili  0.1  /  AST  12  /  ALT  7<L>  /  AlkPhos  48  07-12    Creatinine Trend: 1.59<--, 1.11<--, 1.17<--, 1.75<--, 2.04<--, 1.76<--  PT/INR - ( 11 Jul 2023 10:42 )   PT: 16.3 sec;   INR: 1.40 ratio         PTT - ( 11 Jul 2023 10:42 )  PTT:32.6 sec  Urinalysis Basic - ( 12 Jul 2023 07:04 )    Color: x / Appearance: x / SG: x / pH: x  Gluc: 132 mg/dL / Ketone: x  / Bili: x / Urobili: x   Blood: x / Protein: x / Nitrite: x   Leuk Esterase: x / RBC: x / WBC x   Sq Epi: x / Non Sq Epi: x / Bacteria: x        Venous Blood Gas:  07-10 @ 13:49  7.34/45/65/24/91.1  VBG Lactate: 1.8      MICROBIOLOGY:   Culture - Blood (07.11.23 @ 10:32)    -  Coagulase negative Staphylococcus: Detec   Gram Stain:   Growth in aerobic bottle: Gram Positive Cocci in Clusters   Specimen Source: .Blood Blood-Peripheral   Organism: Blood Culture PCR   Culture Results:   Growth in aerobic bottle: Gram Positive Cocci in Clusters  Direct identification is available within approximately 3-5  hours either by Blood Panel Multiplexed PCR or Direct  MALDI-TOF. Details: [email or web address]   Organism Identification: Blood Culture PCR   Method Type: PCR      RADIOLOGY & ADDITIONAL TESTS:  < from: CT Chest w/ IV Cont (07.11.23 @ 08:57) >  IMPRESSION:  *  Elevation of the right hemidiaphragm with right lower lobe   consolidation consistent with pneumonia and/or atelectasis, similar to   findings present on prior exam.  *  No evidence of acute abnormality in the abdomen and pelvis.        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 Patient had routine CBC and was found to have WBC 16- no culture data, no further workup, was started on meropenem empirically but no source of infection identified. Meropenem started on 7/8. Pt stated that he feels fine and was feeling well at rehab. Although, he stated he did not like the rehab it was at and said it was "dirty." he was found to have tachycardia and elevated WBC here on admission. He was breathing comfortable on RA.      Pt did report some dyspnea on exertion, chest tightness since heart transplant, denied any cough, sore throat, nasal congestion, cp, any fever, chills, or n/v/d. 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  More recent admissions have been related to an episode of viral hMPV pneumonia. 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 He also had CT guided lung biopsy and bronchoscopy in the past which are unremarkable. He has PPD skin test done in his right forearm too, which is negative as reported by himself.     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 Pt denied illicit drug or alcohol use.  His wife passed away years ago. No children    MEDICATIONS  (STANDING):  apixaban 5 milliGRAM(s) Oral every 12 hours  aspirin  chewable 81 milliGRAM(s) Oral daily  atorvastatin 10 milliGRAM(s) Oral at bedtime  buMETAnide 1 milliGRAM(s) Oral daily  dextrose 5%. 1000 milliLiter(s) (50 mL/Hr) IV Continuous <Continuous>  dextrose 5%. 1000 milliLiter(s) (100 mL/Hr) IV Continuous <Continuous>  dextrose 50% Injectable 25 Gram(s) IV Push once  dextrose 50% Injectable 12.5 Gram(s) IV Push once  dextrose 50% Injectable 25 Gram(s) IV Push once  glucagon  Injectable 1 milliGRAM(s) IntraMuscular once  insulin lispro (ADMELOG) corrective regimen sliding scale   SubCutaneous three times a day before meals  insulin lispro (ADMELOG) corrective regimen sliding scale   SubCutaneous at bedtime  latanoprost 0.005% Ophthalmic Solution 1 Drop(s) Both EYES at bedtime  meropenem  IVPB      meropenem  IVPB 500 milliGRAM(s) IV Intermittent every 8 hours  metoprolol succinate  milliGRAM(s) Oral daily  nystatin    Suspension 352248 Unit(s) Oral three times a day  pantoprazole    Tablet 40 milliGRAM(s) Oral before breakfast  predniSONE   Tablet 10 milliGRAM(s) Oral three times a day  tacrolimus 3 milliGRAM(s) Oral two times a day  trimethoprim   80 mG/sulfamethoxazole 400 mG 1 Tablet(s) Oral <User Schedule>  valGANciclovir 450 milliGRAM(s) Oral <User Schedule>    MEDICATIONS  (PRN):  acetaminophen     Tablet .. 650 milliGRAM(s) Oral every 6 hours PRN Temp greater or equal to 38C (100.4F), Mild Pain (1 - 3)  dextrose Oral Gel 15 Gram(s) Oral once PRN Blood Glucose LESS THAN 70 milliGRAM(s)/deciliter  melatonin 3 milliGRAM(s) Oral at bedtime PRN Insomnia    Allergies  No Known Allergies  Intolerances    OBJECTIVE:  ICU Vital Signs Last 24 Hrs  T(C): 36.3 (12 Jul 2023 09:27), Max: 36.5 (11 Jul 2023 16:15)  T(F): 97.3 (12 Jul 2023 09:27), Max: 97.7 (11 Jul 2023 16:15)  HR: 101 (12 Jul 2023 09:27) (101 - 108)  BP: 118/83 (12 Jul 2023 09:27) (111/79 - 121/77)  BP(mean): --  ABP: --  ABP(mean): --  RR: 18 (12 Jul 2023 09:27) (18 - 18)  SpO2: 100% (12 Jul 2023 09:27) (96% - 100%)    O2 Parameters below as of 12 Jul 2023 09:27  Patient On (Oxygen Delivery Method): room air    07-11 @ 07:01  -  07-12 @ 07:00  --------------------------------------------------------  IN: 0 mL / OUT: 400 mL / NET: -400 mL    CAPILLARY BLOOD GLUCOSE    POCT Blood Glucose.: 136 mg/dL (12 Jul 2023 08:08)    PHYSICAL EXAM:  GENERAL: NAD, lying in bed comfortably  HEAD:  Atraumatic, normocephalic  EYES: EOMI, PERRLA, conjunctiva and sclera clear  ENT: Moist mucous membranes  NECK: Supple, no JVD  HEART: S1, S2, regular rate and rhythm, no murmurs, rubs, or gallops  LUNGS: Unlabored respirations.  mild rhonchi heard in the middle right lung  ABDOMEN: Soft, nontender, nondistended, +BS  EXTREMITIES: 2+ peripheral pulses bilaterally. bilaterally edema noted, worse in the left leg than right leg.   NERVOUS SYSTEM:  A&Ox3, no focal deficits   SKIN: No rashes or lesions    LABS:             9.8    13.60 )-----------( 273      ( 12 Jul 2023 07:06 )             33.9     Hgb Trend: 9.8<--, 10.0<--, 9.9<--  07-12    140  |  102  |  39<H>  ----------------------------<  132<H>  5.5<H>   |  26  |  1.59<H>    Ca    9.0      12 Jul 2023 07:04  Phos  4.2     07-12  Mg     2.3     07-12    TPro  6.3  /  Alb  3.9  /  TBili  0.6  /  DBili  0.1  /  AST  12  /  ALT  7<L>  /  AlkPhos  48  07-12    Creatinine Trend: 1.59<--, 1.11<--, 1.17<--, 1.75<--, 2.04<--, 1.76<--  PT/INR - ( 11 Jul 2023 10:42 )   PT: 16.3 sec;   INR: 1.40 ratio         PTT - ( 11 Jul 2023 10:42 )  PTT:32.6 sec  Urinalysis Basic - ( 12 Jul 2023 07:04 )    Color: x / Appearance: x / SG: x / pH: x  Gluc: 132 mg/dL / Ketone: x  / Bili: x / Urobili: x   Blood: x / Protein: x / Nitrite: x   Leuk Esterase: x / RBC: x / WBC x   Sq Epi: x / Non Sq Epi: x / Bacteria: x    Venous Blood Gas:  07-10 @ 13:49  7.34/45/65/24/91.1  VBG Lactate: 1.8    MICROBIOLOGY:   Culture - Blood (07.11.23 @ 10:32)    -  Coagulase negative Staphylococcus: Detec   Gram Stain:   Growth in aerobic bottle: Gram Positive Cocci in Clusters   Specimen Source: .Blood Blood-Peripheral   Organism: Blood Culture PCR   Culture Results:   Growth in aerobic bottle: Gram Positive Cocci in Clusters  Direct identification is available within approximately 3-5  hours either by Blood Panel Multiplexed PCR or Direct  MALDI-TOF. Details: [email or web address]   Organism Identification: Blood Culture PCR   Method Type: PCR      RADIOLOGY & ADDITIONAL TESTS:  < from: CT Chest w/ IV Cont (07.11.23 @ 08:57) >  IMPRESSION:  *  Elevation of the right hemidiaphragm with right lower lobe   consolidation consistent with pneumonia and/or atelectasis, similar to   findings present on prior exam.  *  No evidence of acute abnormality in the abdomen and pelvis.    --- End of Report ---    < end of copied text >

## 2023-07-12 NOTE — PROGRESS NOTE ADULT - NS ATTEND AMEND GEN_ALL_CORE FT
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  Awaiting results of cultures  Appreciate transplant DI recs, they would like pulm consulted for possible bronch/BAL for RLL atelectasis vs consolidation on CT chest  C/w tacrolimus for a goal of 4-6, please send tac level half hour before the dose is given for a true trough  Patient will need social work involvement to help with a safe discharge plan, cannot go back to the rehab facility which he currently resides in.
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  Awaiting results of cultures  Appreciate transplant ID recs, they would like pulm consulted for possible bronch/BAL for RLL atelectasis vs consolidation on CT chest  C/w tacrolimus for a goal of 6-8, please send tac level half hour before the dose is given for a true trough  Patient will need social work involvement to help with a safe discharge plan, cannot go back to the rehab facility which he currently resides in.

## 2023-07-12 NOTE — CONSULT NOTE ADULT - ASSESSMENT
ASSESSMENT  IRVING RUSSELL is a 73y male with PMH *** in the hospital for 2d transferred to the MICU for ***.   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), CKD IV, and post-transplant pAF/AFl (on Eliquis) and two recent hospital admissions for heart failure exacerbation (5/23-6/3) and hypoglycemia (6/14-6/23) presenting from rehab after being found to have leukocytosis and empirically started on meropenem on 07/08    #RLL  -Appears chronic with consolidation/atelectasis similar to prior exam, due to hemidiaphragm elevation, present since 2020, improving too based on comparison review    -follow up with blood culture, quant gold, currently on meropenem, can send fungitell   -BAL/bronchoscopy unlikely of high yield given the chronic nature of the atelectasis, and his respiratory status   -rest of care per primary team     Recommendations are preliminary and not final until attested    Isreal Campbell PGY2

## 2023-07-12 NOTE — PROGRESS NOTE ADULT - ASSESSMENT
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 His course was complicated by bilateral IJ/subclavian thrombi, a persistent small right sided pleural effusion, as well as acute on chronic renal failure of unclear etiology. Who presents to ED from HF clinic for ?infection on IV ABT as an outpt. Requiring evaluation given transplant Hx with labs.  Unclear etiology as patient appears well but does not have knowledge of any infections. Leukocytosis present on admission    Would:   Rehab. facility for further information is unrevealing  Blood cultures sent and pending  CT of chest shows RLL atelectasis vs consolidation with an elevated hemidiaphragm- difficult to really assess but is chronic in nature  ?source other UTI?  send UA and culture  serum fungitell and galactomannan  complete 7 days total meropenem and then observe off anti infectives    Gary Lujan MD  Can be called via Teams  After 5pm/weekends [phone]

## 2023-07-12 NOTE — CONSULT NOTE ADULT - TIME BILLING
Personal review of data, imaging and discussion with medical team.
- Generation of cardiovascular treatment plan.  - Coordination of care with primary team.

## 2023-07-12 NOTE — PROGRESS NOTE ADULT - PROBLEM SELECTOR PLAN 1
Pt w/ leukocytosis and tachycardia on admission, started on IV meropenem on 7/8 at rehab for elevated WBC. No clear source of infx identified. CXR with R basilar atelectasis, CT chest & abdomen with RLL consolidation and R hemidiaphragm elevation, although this seems to be chronic when comparing to prior films. CXR showing R basilar atelectasis. UA unremarkable. RVP negative. Procal 0.11.     - ID following, appreciate recs  - trend WBC  - c/w meropenem for 1 week course   - f/u blood, urine cultures  - pulmonology consult in AM for consideration of bronch given CT chest findings   - f/u quantiferon (pt with PPD in forearm from JP)  - f/u ESR, CRP  - f/u legionella urine ag, serum fungitell, galactomannan Pt w/ leukocytosis and tachycardia on admission, started on IV meropenem on 7/8 at rehab for elevated WBC. No clear source of infx identified. CXR with R basilar atelectasis, CT chest & abdomen with RLL consolidation and R hemidiaphragm elevation, although this seems to be chronic when comparing to prior films. CXR showing R basilar atelectasis. UA unremarkable. RVP negative. Procal 0.11.     - ID following, appreciate recs  - trend WBC  - c/w meropenem for 1 week course (pending bronch recs)   - f/u blood, urine cultures  - pulmonology consult in AM for consideration of bronch given CT chest findings   - f/u quantiferon (pt with PPD in forearm from JP)  - f/u ESR, CRP  - f/u legionella urine ag, serum fungitell, galactomannan  - CT chest with RLL consolidation, appears to be chronic when compared with prior scans but no other convincing source of infection identified at this time - pulmonology consulted for consideration of bronchoscopy and further workup  - 1/2 blood culture sets growing coagulase negative staph, may represent contamination but will f/u ID recs

## 2023-07-13 ENCOUNTER — TRANSCRIPTION ENCOUNTER (OUTPATIENT)
Age: 73
End: 2023-07-13

## 2023-07-13 LAB
ALBUMIN SERPL ELPH-MCNC: 4.2 G/DL — SIGNIFICANT CHANGE UP (ref 3.3–5)
ALP SERPL-CCNC: 56 U/L — SIGNIFICANT CHANGE UP (ref 40–120)
ALT FLD-CCNC: 9 U/L — LOW (ref 10–45)
ANION GAP SERPL CALC-SCNC: 15 MMOL/L — SIGNIFICANT CHANGE UP (ref 5–17)
AST SERPL-CCNC: 14 U/L — SIGNIFICANT CHANGE UP (ref 10–40)
BASOPHILS # BLD AUTO: 0.01 K/UL — SIGNIFICANT CHANGE UP (ref 0–0.2)
BASOPHILS NFR BLD AUTO: 0.1 % — SIGNIFICANT CHANGE UP (ref 0–2)
BILIRUB DIRECT SERPL-MCNC: 0.2 MG/DL — SIGNIFICANT CHANGE UP (ref 0–0.3)
BILIRUB INDIRECT FLD-MCNC: 0.4 MG/DL — SIGNIFICANT CHANGE UP (ref 0.2–1)
BILIRUB SERPL-MCNC: 0.6 MG/DL — SIGNIFICANT CHANGE UP (ref 0.2–1.2)
BILIRUB SERPL-MCNC: 0.6 MG/DL — SIGNIFICANT CHANGE UP (ref 0.2–1.2)
BUN SERPL-MCNC: 48 MG/DL — HIGH (ref 7–23)
CALCIUM SERPL-MCNC: 9.4 MG/DL — SIGNIFICANT CHANGE UP (ref 8.4–10.5)
CHLORIDE SERPL-SCNC: 99 MMOL/L — SIGNIFICANT CHANGE UP (ref 96–108)
CO2 SERPL-SCNC: 24 MMOL/L — SIGNIFICANT CHANGE UP (ref 22–31)
CREAT SERPL-MCNC: 1.6 MG/DL — HIGH (ref 0.5–1.3)
EGFR: 45 ML/MIN/1.73M2 — LOW
EOSINOPHIL # BLD AUTO: 0 K/UL — SIGNIFICANT CHANGE UP (ref 0–0.5)
EOSINOPHIL NFR BLD AUTO: 0 % — SIGNIFICANT CHANGE UP (ref 0–6)
GAMMA INTERFERON BACKGROUND BLD IA-ACNC: 0.02 IU/ML — SIGNIFICANT CHANGE UP
GLUCOSE BLDC GLUCOMTR-MCNC: 131 MG/DL — HIGH (ref 70–99)
GLUCOSE BLDC GLUCOMTR-MCNC: 137 MG/DL — HIGH (ref 70–99)
GLUCOSE BLDC GLUCOMTR-MCNC: 145 MG/DL — HIGH (ref 70–99)
GLUCOSE BLDC GLUCOMTR-MCNC: 148 MG/DL — HIGH (ref 70–99)
GLUCOSE SERPL-MCNC: 116 MG/DL — HIGH (ref 70–99)
HCT VFR BLD CALC: 35 % — LOW (ref 39–50)
HGB BLD-MCNC: 10.3 G/DL — LOW (ref 13–17)
IMM GRANULOCYTES NFR BLD AUTO: 0.9 % — SIGNIFICANT CHANGE UP (ref 0–0.9)
LDH SERPL L TO P-CCNC: 304 U/L — HIGH (ref 50–242)
LYMPHOCYTES # BLD AUTO: 1.07 K/UL — SIGNIFICANT CHANGE UP (ref 1–3.3)
LYMPHOCYTES # BLD AUTO: 6.9 % — LOW (ref 13–44)
M TB IFN-G BLD-IMP: NEGATIVE — SIGNIFICANT CHANGE UP
M TB IFN-G CD4+ BCKGRND COR BLD-ACNC: -0.01 IU/ML — SIGNIFICANT CHANGE UP
M TB IFN-G CD4+CD8+ BCKGRND COR BLD-ACNC: -0.01 IU/ML — SIGNIFICANT CHANGE UP
MAGNESIUM SERPL-MCNC: 2.3 MG/DL — SIGNIFICANT CHANGE UP (ref 1.6–2.6)
MCHC RBC-ENTMCNC: 28.9 PG — SIGNIFICANT CHANGE UP (ref 27–34)
MCHC RBC-ENTMCNC: 29.4 GM/DL — LOW (ref 32–36)
MCV RBC AUTO: 98.3 FL — SIGNIFICANT CHANGE UP (ref 80–100)
MONOCYTES # BLD AUTO: 0.53 K/UL — SIGNIFICANT CHANGE UP (ref 0–0.9)
MONOCYTES NFR BLD AUTO: 3.4 % — SIGNIFICANT CHANGE UP (ref 2–14)
NEUTROPHILS # BLD AUTO: 13.81 K/UL — HIGH (ref 1.8–7.4)
NEUTROPHILS NFR BLD AUTO: 88.7 % — HIGH (ref 43–77)
NRBC # BLD: 0 /100 WBCS — SIGNIFICANT CHANGE UP (ref 0–0)
NT-PROBNP SERPL-SCNC: 366 PG/ML — HIGH (ref 0–300)
PHOSPHATE SERPL-MCNC: 3.8 MG/DL — SIGNIFICANT CHANGE UP (ref 2.5–4.5)
PLATELET # BLD AUTO: 290 K/UL — SIGNIFICANT CHANGE UP (ref 150–400)
POTASSIUM SERPL-MCNC: 4.8 MMOL/L — SIGNIFICANT CHANGE UP (ref 3.5–5.3)
POTASSIUM SERPL-SCNC: 4.8 MMOL/L — SIGNIFICANT CHANGE UP (ref 3.5–5.3)
PROT SERPL-MCNC: 7.3 G/DL — SIGNIFICANT CHANGE UP (ref 6–8.3)
QUANT TB PLUS MITOGEN MINUS NIL: 5.48 IU/ML — SIGNIFICANT CHANGE UP
RBC # BLD: 3.56 M/UL — LOW (ref 4.2–5.8)
RBC # FLD: 19.5 % — HIGH (ref 10.3–14.5)
SODIUM SERPL-SCNC: 138 MMOL/L — SIGNIFICANT CHANGE UP (ref 135–145)
TACROLIMUS SERPL-MCNC: 4.2 NG/ML — SIGNIFICANT CHANGE UP
WBC # BLD: 15.56 K/UL — HIGH (ref 3.8–10.5)
WBC # FLD AUTO: 15.56 K/UL — HIGH (ref 3.8–10.5)

## 2023-07-13 PROCEDURE — 99232 SBSQ HOSP IP/OBS MODERATE 35: CPT

## 2023-07-13 PROCEDURE — 99232 SBSQ HOSP IP/OBS MODERATE 35: CPT | Mod: GC

## 2023-07-13 RX ORDER — MEROPENEM 500 MG/20ML
500 INJECTION INTRAVENOUS
Refills: 0 | Status: DISCONTINUED | COMMUNITY
Start: 2023-07-10 | End: 2023-07-13

## 2023-07-13 RX ORDER — TACROLIMUS 5 MG/1
3.5 CAPSULE ORAL
Refills: 0 | Status: DISCONTINUED | OUTPATIENT
Start: 2023-07-13 | End: 2023-07-15

## 2023-07-13 RX ORDER — TACROLIMUS 5 MG/1
3 CAPSULE ORAL
Refills: 0 | Status: DISCONTINUED | OUTPATIENT
Start: 2023-07-13 | End: 2023-07-13

## 2023-07-13 RX ORDER — ATORVASTATIN CALCIUM 10 MG/1
10 TABLET, FILM COATED ORAL
Refills: 0 | Status: DISCONTINUED | COMMUNITY
Start: 2023-07-10 | End: 2023-07-13

## 2023-07-13 RX ORDER — IRON SUCROSE 20 MG/ML
20 INJECTION, SOLUTION INTRAVENOUS
Refills: 0 | Status: DISCONTINUED | COMMUNITY
Start: 2023-07-10 | End: 2023-07-13

## 2023-07-13 RX ADMIN — ATORVASTATIN CALCIUM 10 MILLIGRAM(S): 80 TABLET, FILM COATED ORAL at 21:20

## 2023-07-13 RX ADMIN — Medication 81 MILLIGRAM(S): at 12:22

## 2023-07-13 RX ADMIN — Medication 10 MILLIGRAM(S): at 21:20

## 2023-07-13 RX ADMIN — Medication 500000 UNIT(S): at 16:17

## 2023-07-13 RX ADMIN — LATANOPROST 1 DROP(S): 0.05 SOLUTION/ DROPS OPHTHALMIC; TOPICAL at 22:16

## 2023-07-13 RX ADMIN — TACROLIMUS 3.5 MILLIGRAM(S): 5 CAPSULE ORAL at 21:21

## 2023-07-13 RX ADMIN — Medication 10 MILLIGRAM(S): at 16:17

## 2023-07-13 RX ADMIN — APIXABAN 5 MILLIGRAM(S): 2.5 TABLET, FILM COATED ORAL at 05:16

## 2023-07-13 RX ADMIN — MEROPENEM 100 MILLIGRAM(S): 1 INJECTION INTRAVENOUS at 05:16

## 2023-07-13 RX ADMIN — Medication 10 MILLIGRAM(S): at 05:16

## 2023-07-13 RX ADMIN — Medication 500000 UNIT(S): at 21:22

## 2023-07-13 RX ADMIN — APIXABAN 5 MILLIGRAM(S): 2.5 TABLET, FILM COATED ORAL at 17:24

## 2023-07-13 RX ADMIN — PANTOPRAZOLE SODIUM 40 MILLIGRAM(S): 20 TABLET, DELAYED RELEASE ORAL at 05:15

## 2023-07-13 RX ADMIN — TACROLIMUS 3 MILLIGRAM(S): 5 CAPSULE ORAL at 08:41

## 2023-07-13 RX ADMIN — Medication 500000 UNIT(S): at 05:16

## 2023-07-13 RX ADMIN — BUMETANIDE 1 MILLIGRAM(S): 0.25 INJECTION INTRAMUSCULAR; INTRAVENOUS at 05:16

## 2023-07-13 RX ADMIN — MEROPENEM 100 MILLIGRAM(S): 1 INJECTION INTRAVENOUS at 16:17

## 2023-07-13 RX ADMIN — Medication 100 MILLIGRAM(S): at 05:16

## 2023-07-13 RX ADMIN — MEROPENEM 100 MILLIGRAM(S): 1 INJECTION INTRAVENOUS at 21:21

## 2023-07-13 NOTE — PROGRESS NOTE ADULT - PROBLEM SELECTOR PLAN 1
Pt w/ leukocytosis and tachycardia on admission, started on IV meropenem on 7/8 at rehab for elevated WBC. No clear source of infx identified. CXR with R basilar atelectasis, CT chest & abdomen with RLL consolidation and R hemidiaphragm elevation, although this seems to be chronic when comparing to prior films. CXR showing R basilar atelectasis. UA unremarkable. RVP negative. Procal 0.11.     - ID following, appreciate recs  - trend WBC  - c/w meropenem for 1 week course (pending bronch recs)   - f/u blood, urine cultures  - pulmonology consult in AM for consideration of bronch given CT chest findings   - f/u quantiferon (pt with PPD in forearm from [facility])  - f/u ESR, CRP  - f/u legionella urine ag, serum fungitell, galactomannan  - CT chest with RLL consolidation, appears to be chronic when compared with prior scans but no other convincing source of infection identified at this time - pulmonology consulted for consideration of bronchoscopy and further workup  - 1/2 blood culture sets growing coagulase negative staph, may represent contamination but will f/u ID recs

## 2023-07-13 NOTE — PROGRESS NOTE ADULT - ATTENDING COMMENTS
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   Feeling well, denies any localizing symptoms. decreased BS at R base, +b/l LE edema L>R     - CT C/A/P with ?RLL consolidation, which was noted on prior imaging as well, ?PNA - pulm consulted, appears chronic on imaging, no role for bronch   - f/u culture data --> 1/2 Bcx +CoNS, suspect contamination, f/u ID   - ID following, magalie to c/w meropenem for 7d total and monitor off afterwards   - c/w tacro 3mg BID, check levels in AM   - c/w prednisone 30mg for Nicole syndrome, hematology input appreciated   -  HF following, appears near euvolemia - c/w current bumex dose   - Cr fluctuating, unclear if NORMAN vs CKD - Cr 1.6 today (<--1.9), c/t monitor, check lytes     #leukocytosis, SIRS  #s/p heart transplant   #Nicloe syndrome  #NORMAN  #T2DM   #chronic Afib.

## 2023-07-13 NOTE — PROGRESS NOTE ADULT - PROBLEM SELECTOR PLAN 5
Pt takes admelog 7u tid and glargine 8u at bedtime  - continuing home regimen, CBGs mostly 120s-180s this admission

## 2023-07-13 NOTE — PROGRESS NOTE ADULT - ASSESSMENT
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 on 7/8.

## 2023-07-13 NOTE — PROGRESS NOTE ADULT - ASSESSMENT
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 His course was complicated by bilateral IJ/subclavian thrombi, a persistent small right sided pleural effusion, as well as acute on chronic renal failure of unclear etiology. Who presents to ED from HF clinic for ?infection on IV ABT as an outpt. Requiring evaluation given transplant Hx with labs.  Unclear etiology as patient appears well but does not have knowledge of any infections. Leukocytosis present on admission    Would:   Rehab. facility for further information is unrevealing  Blood cultures sent and no growth  CT of chest shows RLL atelectasis vs consolidation with an elevated hemidiaphragm- difficult to really assess but is chronic in nature  ?source other UTI?  send UA and culture  serum fungitell and galactomannan  complete 7 days total meropenem and then observe off anti infectives as leukocytosis has persisted despite treatment    Gary Lujan MD  Can be called via Teams  After 5pm/weekends [phone]

## 2023-07-13 NOTE — PROGRESS NOTE ADULT - PROBLEM SELECTOR PLAN 3
Follows with Dr. Rosario. As an outpatient, discussed possibly slow wean prednisone.   - hematology following, appreciate recs  - will trend hemolysis markers daily: LDH, reticulocyte count, CMP, phos, fractionated direct and indirect bilirubin, haptoglobin  - Hb stable this admission at ~9  - c/w Prednisone 30mg QD  - continue protonix for GI protection on prednisone  - continue bactrim for PJP ppx - increased dose to bactrim DS TID (dose appears to have been reduced on prior admission for renal function, will monitor for hyperkalemia at increased dose)

## 2023-07-13 NOTE — PROGRESS NOTE ADULT - SUBJECTIVE AND OBJECTIVE BOX
INFECTIOUS DISEASES FOLLOW UP-- Sujey Lujan  [phone]    This is a follow up note for this  73yMale with  Anemia        ROS:  CONSTITUTIONAL:  No fever, good appetite  CARDIOVASCULAR:  No chest pain or palpitations  RESPIRATORY:  No dyspnea  GASTROINTESTINAL:  No nausea, vomiting, diarrhea, or abdominal pain  GENITOURINARY:  No dysuria  NEUROLOGIC:  No headache,     Allergies    No Known Allergies    Intolerances        ANTIBIOTICS/RELEVANT:  antimicrobials  meropenem  IVPB      meropenem  IVPB 500 milliGRAM(s) IV Intermittent every 8 hours  nystatin    Suspension 101795 Unit(s) Oral three times a day  trimethoprim  160 mG/sulfamethoxazole 800 mG 1 Tablet(s) Oral <User Schedule>  valGANciclovir 450 milliGRAM(s) Oral <User Schedule>    immunologic:  tacrolimus 3.5 milliGRAM(s) Oral <User Schedule>    OTHER:  acetaminophen     Tablet .. 650 milliGRAM(s) Oral every 6 hours PRN  apixaban 5 milliGRAM(s) Oral every 12 hours  aspirin  chewable 81 milliGRAM(s) Oral daily  atorvastatin 10 milliGRAM(s) Oral at bedtime  buMETAnide 1 milliGRAM(s) Oral daily  dextrose 5%. 1000 milliLiter(s) IV Continuous <Continuous>  dextrose 5%. 1000 milliLiter(s) IV Continuous <Continuous>  dextrose 50% Injectable 25 Gram(s) IV Push once  dextrose 50% Injectable 25 Gram(s) IV Push once  dextrose 50% Injectable 12.5 Gram(s) IV Push once  dextrose Oral Gel 15 Gram(s) Oral once PRN  glucagon  Injectable 1 milliGRAM(s) IntraMuscular once  insulin lispro (ADMELOG) corrective regimen sliding scale   SubCutaneous three times a day before meals  insulin lispro (ADMELOG) corrective regimen sliding scale   SubCutaneous at bedtime  latanoprost 0.005% Ophthalmic Solution 1 Drop(s) Both EYES at bedtime  melatonin 3 milliGRAM(s) Oral at bedtime PRN  metoprolol succinate  milliGRAM(s) Oral daily  pantoprazole    Tablet 40 milliGRAM(s) Oral before breakfast  predniSONE   Tablet 10 milliGRAM(s) Oral three times a day      Objective:  Vital Signs Last 24 Hrs  T(C): 36.4 (13 Jul 2023 09:26), Max: 36.5 (12 Jul 2023 16:31)  T(F): 97.6 (13 Jul 2023 09:26), Max: 97.7 (12 Jul 2023 16:31)  HR: 92 (13 Jul 2023 09:26) (92 - 111)  BP: 129/88 (13 Jul 2023 09:26) (124/85 - 129/88)  BP(mean): --  RR: 18 (13 Jul 2023 09:26) (18 - 18)  SpO2: 100% (13 Jul 2023 09:26) (96% - 100%)    Parameters below as of 13 Jul 2023 09:26  Patient On (Oxygen Delivery Method): room air        PHYSICAL EXAM:  Constitutional:no acute distress  Eyes:WALT, EOMI  Ear/Nose/Throat: no oral lesions, 	  Respiratory: clear BL  Cardiovascular: S1S2  Gastrointestinal:soft, (+) BS, no tenderness  Extremities:no e/e/c  No Lymphadenopathy  IV sites not inflammed.    LABS:                        10.3   15.56 )-----------( 290      ( 13 Jul 2023 10:26 )             35.0     07-13    138  |  99  |  48<H>  ----------------------------<  116<H>  4.8   |  24  |  1.60<H>    Ca    9.4      13 Jul 2023 10:26  Phos  3.8     07-13  Mg     2.3     07-13    TPro  7.3  /  Alb  4.2  /  TBili  0.6  /  DBili  0.2  /  AST  14  /  ALT  9<L>  /  AlkPhos  56  07-13      Urinalysis Basic - ( 13 Jul 2023 10:26 )    Color: x / Appearance: x / SG: x / pH: x  Gluc: 116 mg/dL / Ketone: x  / Bili: x / Urobili: x   Blood: x / Protein: x / Nitrite: x   Leuk Esterase: x / RBC: x / WBC x   Sq Epi: x / Non Sq Epi: x / Bacteria: x        MICROBIOLOGY:            RECENT CULTURES:  07-11 @ 10:32  .Blood Blood-Peripheral  Blood Culture PCR  Blood Culture PCR  PCR    Growth in aerobic bottle: Staphylococcus saprophyticus  Coagulase Negative Staphylococci isolated from a single blood culture set  may represent contamination.  Contact the Microbiology Department at [phone] if susceptibility  testing is clinically indicated.  Direct identification is available within approximately 3-5  hours either by Blood Panel Multiplexed PCR or Direct  MALDI-TOF. Details: [email or web address]  --  07-11 @ 10:29  .Blood Blood-Peripheral  --  --  --    No growth at 48 Hours  --  07-10 @ 17:19  Clean Catch Clean Catch (Midstream)  --  --  --    <10,000 CFU/mL Normal Urogenital Heaven  --  07-10 @ 15:38  .Blood Blood-Peripheral  --  --  --    No growth at 48 Hours  --      RADIOLOGY & ADDITIONAL STUDIES: INFECTIOUS DISEASES FOLLOW UP-- Sujey Lujan  [phone]    This is a follow up note for this  73yMale with  Anemia        ROS:  CONSTITUTIONAL:  No fever, good appetite  CARDIOVASCULAR:  No chest pain or palpitations  RESPIRATORY:  No dyspnea  GASTROINTESTINAL:  No nausea, vomiting, diarrhea, or abdominal pain  GENITOURINARY:  No dysuria  NEUROLOGIC:  No headache,     Allergies    No Known Allergies    Intolerances        ANTIBIOTICS/RELEVANT:  antimicrobials  meropenem  IVPB      meropenem  IVPB 500 milliGRAM(s) IV Intermittent every 8 hours  nystatin    Suspension [id number] Unit(s) Oral three times a day  trimethoprim  160 mG/sulfamethoxazole 800 mG 1 Tablet(s) Oral <User Schedule>  valGANciclovir 450 milliGRAM(s) Oral <User Schedule>    immunologic:  tacrolimus 3.5 milliGRAM(s) Oral <User Schedule>    OTHER:  acetaminophen     Tablet .. 650 milliGRAM(s) Oral every 6 hours PRN  apixaban 5 milliGRAM(s) Oral every 12 hours  aspirin  chewable 81 milliGRAM(s) Oral daily  atorvastatin 10 milliGRAM(s) Oral at bedtime  buMETAnide 1 milliGRAM(s) Oral daily  dextrose 5%. 1000 milliLiter(s) IV Continuous <Continuous>  dextrose 5%. 1000 milliLiter(s) IV Continuous <Continuous>  dextrose 50% Injectable 25 Gram(s) IV Push once  dextrose 50% Injectable 25 Gram(s) IV Push once  dextrose 50% Injectable 12.5 Gram(s) IV Push once  dextrose Oral Gel 15 Gram(s) Oral once PRN  glucagon  Injectable 1 milliGRAM(s) IntraMuscular once  insulin lispro (ADMELOG) corrective regimen sliding scale   SubCutaneous three times a day before meals  insulin lispro (ADMELOG) corrective regimen sliding scale   SubCutaneous at bedtime  latanoprost 0.005% Ophthalmic Solution 1 Drop(s) Both EYES at bedtime  melatonin 3 milliGRAM(s) Oral at bedtime PRN  metoprolol succinate  milliGRAM(s) Oral daily  pantoprazole    Tablet 40 milliGRAM(s) Oral before breakfast  predniSONE   Tablet 10 milliGRAM(s) Oral three times a day      Objective:  Vital Signs Last 24 Hrs  T(C): 36.4 (13 Jul 2023 09:26), Max: 36.5 (12 Jul 2023 16:31)  T(F): 97.6 (13 Jul 2023 09:26), Max: 97.7 (12 Jul 2023 16:31)  HR: 92 (13 Jul 2023 09:26) (92 - 111)  BP: 129/88 (13 Jul 2023 09:26) (124/85 - 129/88)  BP(mean): --  RR: 18 (13 Jul 2023 09:26) (18 - 18)  SpO2: 100% (13 Jul 2023 09:26) (96% - 100%)    Parameters below as of 13 Jul 2023 09:26  Patient On (Oxygen Delivery Method): room air        PHYSICAL EXAM:  Constitutional:no acute distress  Eyes:WALT, EOMI  Ear/Nose/Throat: no oral lesions, 	  Respiratory: clear BL  Cardiovascular: S1S2  Gastrointestinal:soft, (+) BS, no tenderness  Extremities:no e/e/c  No Lymphadenopathy  IV sites not inflammed.    LABS:                        10.3   15.56 )-----------( 290      ( 13 Jul 2023 10:26 )             35.0     07-13    138  |  99  |  48<H>  ----------------------------<  116<H>  4.8   |  24  |  1.60<H>    Ca    9.4      13 Jul 2023 10:26  Phos  3.8     07-13  Mg     2.3     07-13    TPro  7.3  /  Alb  4.2  /  TBili  0.6  /  DBili  0.2  /  AST  14  /  ALT  9<L>  /  AlkPhos  56  07-13      Urinalysis Basic - ( 13 Jul 2023 10:26 )    Color: x / Appearance: x / SG: x / pH: x  Gluc: 116 mg/dL / Ketone: x  / Bili: x / Urobili: x   Blood: x / Protein: x / Nitrite: x   Leuk Esterase: x / RBC: x / WBC x   Sq Epi: x / Non Sq Epi: x / Bacteria: x        MICROBIOLOGY:            RECENT CULTURES:  07-11 @ 10:32  .Blood Blood-Peripheral  Blood Culture PCR  Blood Culture PCR  PCR    Growth in aerobic bottle: Staphylococcus saprophyticus  Coagulase Negative Staphylococci isolated from a single blood culture set  may represent contamination.  Contact the Microbiology Department at [phone] if susceptibility  testing is clinically indicated.  Direct identification is available within approximately 3-5  hours either by Blood Panel Multiplexed PCR or Direct  MALDI-TOF. Details: [email or web address]  --  07-11 @ 10:29  .Blood Blood-Peripheral  --  --  --    No growth at 48 Hours  --  07-10 @ 17:19  Clean Catch Clean Catch (Midstream)  --  --  --    <10,000 CFU/mL Normal Urogenital [place]  --  07-10 @ 15:38  .Blood Blood-Peripheral  --  --  --    No growth at 48 Hours  --      RADIOLOGY & ADDITIONAL STUDIES:    < from: CT Chest w/ IV Cont (07.11.23 @ 08:57) >  IMPRESSION:  *  Elevation of the right hemidiaphragm with right lower lobe   consolidation consistent with pneumonia and/or atelectasis, similar to   findings present on prior exam.  *  No evidence of acute abnormality in the abdomen and pelvis.    < end of copied text >  < from: CT Chest w/ IV Cont (07.11.23 @ 08:57) >  IMPRESSION:  *  Elevation of the right hemidiaphragm with right lower lobe   consolidation consistent with pneumonia and/or atelectasis, similar to   findings present on prior exam.  *  No evidence of acute abnormality in the abdomen and pelvis.    < end of copied text >

## 2023-07-13 NOTE — PROGRESS NOTE ADULT - SUBJECTIVE AND OBJECTIVE BOX
***************************************************************  Sander Ding, PGY3  Internal Medicine   NS pager: [phone]  [facility] pager: [phone]  ***************************************************************        BILLY RUSSELL  73y  Male      Patient is a 73y old  Male who presents with a chief complaint of sepsis w/u (12 Jul 2023 22:38)      INTERVAL HPI/OVERNIGHT EVENTS:       FAMILY HISTORY:    T(C): 36.5 (07-13-23 @ 00:45), Max: 36.5 (07-12-23 @ 16:31)  HR: 110 (07-13-23 @ 00:45) (101 - 111)  BP: 128/86 (07-13-23 @ 00:45) (118/83 - 128/86)  RR: 18 (07-13-23 @ 00:45) (18 - 18)  SpO2: 96% (07-13-23 @ 00:45) (96% - 100%)  Wt(kg): --Vital Signs Last 24 Hrs  T(C): 36.5 (13 Jul 2023 00:45), Max: 36.5 (12 Jul 2023 16:31)  T(F): 97.7 (13 Jul 2023 00:45), Max: 97.7 (12 Jul 2023 16:31)  HR: 110 (13 Jul 2023 00:45) (101 - 111)  BP: 128/86 (13 Jul 2023 00:45) (118/83 - 128/86)  BP(mean): --  RR: 18 (13 Jul 2023 00:45) (18 - 18)  SpO2: 96% (13 Jul 2023 00:45) (96% - 100%)    Parameters below as of 13 Jul 2023 00:45  Patient On (Oxygen Delivery Method): room air      No Known Allergies      PHYSICAL EXAM:  GENERAL: NAD, laying comfortably in bed  HEAD:  Atraumatic, Normocephalic  EYES: EOMI, PERRLA, conjunctiva and sclera clear  ENMT: No tonsillar erythema, exudates, or enlargement; Moist mucous membranes  NECK: Supple, No JVD  NERVOUS SYSTEM:  Alert & Oriented X3, Good concentration; Motor Strength grossly intact in B/L upper and lower extremities;   CHEST/LUNG: Clear to auscultation bilaterally; No rales, rhonchi, wheezing, or rubs  HEART: Regular rate and rhythm; No murmurs, rubs, or gallops  ABDOMEN: Soft, Nontender, Nondistended; Bowel sounds present  EXTREMITIES:  No clubbing, cyanosis, or edema  LYMPH: No lymphadenopathy noted  SKIN: No rashes or lesions        LABS:                            9.8    13.60 )-----------( 273      ( 12 Jul 2023 07:06 )             33.9       07-12    137  |  101  |  42<H>  ----------------------------<  123<H>  4.6   |  19<L>  |  1.89<H>    Ca    9.1      12 Jul 2023 16:26  Phos  4.2     07-12  Mg     2.1     07-12    TPro  6.3  /  Alb  3.9  /  TBili  0.6  /  DBili  0.1  /  AST  12  /  ALT  7<L>  /  AlkPhos  48  07-12              Urinalysis Basic - ( 12 Jul 2023 16:26 )    Color: x / Appearance: x / SG: x / pH: x  Gluc: 123 mg/dL / Ketone: x  / Bili: x / Urobili: x   Blood: x / Protein: x / Nitrite: x   Leuk Esterase: x / RBC: x / WBC x   Sq Epi: x / Non Sq Epi: x / Bacteria: x        PT/INR - ( 11 Jul 2023 10:42 )   PT: 16.3 sec;   INR: 1.40 ratio         PTT - ( 11 Jul 2023 10:42 )  PTT:32.6 sec          CAPILLARY BLOOD GLUCOSE      POCT Blood Glucose.: 159 mg/dL (12 Jul 2023 21:13)                RADIOLOGY & ADDITIONAL TESTS:      acetaminophen     Tablet .. 650 milliGRAM(s) Oral every 6 hours PRN  apixaban 5 milliGRAM(s) Oral every 12 hours  aspirin  chewable 81 milliGRAM(s) Oral daily  atorvastatin 10 milliGRAM(s) Oral at bedtime  buMETAnide 1 milliGRAM(s) Oral daily  dextrose 5%. 1000 milliLiter(s) IV Continuous <Continuous>  dextrose 5%. 1000 milliLiter(s) IV Continuous <Continuous>  dextrose 50% Injectable 25 Gram(s) IV Push once  dextrose 50% Injectable 25 Gram(s) IV Push once  dextrose 50% Injectable 12.5 Gram(s) IV Push once  dextrose Oral Gel 15 Gram(s) Oral once PRN  glucagon  Injectable 1 milliGRAM(s) IntraMuscular once  insulin lispro (ADMELOG) corrective regimen sliding scale   SubCutaneous at bedtime  insulin lispro (ADMELOG) corrective regimen sliding scale   SubCutaneous three times a day before meals  latanoprost 0.005% Ophthalmic Solution 1 Drop(s) Both EYES at bedtime  melatonin 3 milliGRAM(s) Oral at bedtime PRN  meropenem  IVPB 500 milliGRAM(s) IV Intermittent every 8 hours  meropenem  IVPB      metoprolol succinate  milliGRAM(s) Oral daily  nystatin    Suspension 420026 Unit(s) Oral three times a day  pantoprazole    Tablet 40 milliGRAM(s) Oral before breakfast  predniSONE   Tablet 10 milliGRAM(s) Oral three times a day  tacrolimus 3 milliGRAM(s) Oral two times a day  trimethoprim  160 mG/sulfamethoxazole 800 mG 1 Tablet(s) Oral <User Schedule>  valGANciclovir 450 milliGRAM(s) Oral <User Schedule>      HEALTH ISSUES - PROBLEM Dx:  Transplanted heart    Nicole syndrome    Receiving intravenous antibiotic treatment at home    Chronic atrial fibrillation    Hypertension    Chronic kidney disease (CKD)    Encounter for deep vein thrombosis (DVT) prophylaxis    Type 2 diabetes mellitus    Systemic inflammatory response syndrome (SIRS)           ***************************************************************  Sander Ding, PGY3  Internal Medicine   NS pager: [phone]  [facility] pager: [phone]  ***************************************************************        BILLY RUSSELL  73y  Male      Patient is a 73y old  Male who presents with a chief complaint of sepsis w/u (12 Jul 2023 22:38)      INTERVAL HPI/OVERNIGHT EVENTS: No acute events overnight. At bedside, pt had no complaints. Denied fever, chills, CP, SOB, nausea, vomiting, diarrhea, constipation, dysuria.       FAMILY HISTORY:    T(C): 36.5 (07-13-23 @ 00:45), Max: 36.5 (07-12-23 @ 16:31)  HR: 110 (07-13-23 @ 00:45) (101 - 111)  BP: 128/86 (07-13-23 @ 00:45) (118/83 - 128/86)  RR: 18 (07-13-23 @ 00:45) (18 - 18)  SpO2: 96% (07-13-23 @ 00:45) (96% - 100%)  Wt(kg): --Vital Signs Last 24 Hrs  T(C): 36.5 (13 Jul 2023 00:45), Max: 36.5 (12 Jul 2023 16:31)  T(F): 97.7 (13 Jul 2023 00:45), Max: 97.7 (12 Jul 2023 16:31)  HR: 110 (13 Jul 2023 00:45) (101 - 111)  BP: 128/86 (13 Jul 2023 00:45) (118/83 - 128/86)  BP(mean): --  RR: 18 (13 Jul 2023 00:45) (18 - 18)  SpO2: 96% (13 Jul 2023 00:45) (96% - 100%)    Parameters below as of 13 Jul 2023 00:45  Patient On (Oxygen Delivery Method): room air      No Known Allergies      PHYSICAL EXAM:  GENERAL: NAD, laying comfortably in bed  HEAD:  Atraumatic, Normocephalic  EYES: EOMI, PERRLA, conjunctiva and sclera clear  ENMT: No tonsillar erythema, exudates, or enlargement; Moist mucous membranes  NECK: Supple, No JVD  NERVOUS SYSTEM:  Alert & Oriented X3, Good concentration; Motor Strength grossly intact in B/L upper and lower extremities;   CHEST/LUNG: Clear to auscultation bilaterally; No rales, rhonchi, wheezing, or rubs  HEART: Regular rate and rhythm; No murmurs, rubs, or gallops  ABDOMEN: Soft, Nontender, Nondistended; Bowel sounds present  EXTREMITIES:  No clubbing, cyanosis, or edema  LYMPH: No lymphadenopathy noted  SKIN: No rashes or lesions        LABS:                            9.8    13.60 )-----------( 273      ( 12 Jul 2023 07:06 )             33.9       07-12    137  |  101  |  42<H>  ----------------------------<  123<H>  4.6   |  19<L>  |  1.89<H>    Ca    9.1      12 Jul 2023 16:26  Phos  4.2     07-12  Mg     2.1     07-12    TPro  6.3  /  Alb  3.9  /  TBili  0.6  /  DBili  0.1  /  AST  12  /  ALT  7<L>  /  AlkPhos  48  07-12              Urinalysis Basic - ( 12 Jul 2023 16:26 )    Color: x / Appearance: x / SG: x / pH: x  Gluc: 123 mg/dL / Ketone: x  / Bili: x / Urobili: x   Blood: x / Protein: x / Nitrite: x   Leuk Esterase: x / RBC: x / WBC x   Sq Epi: x / Non Sq Epi: x / Bacteria: x        PT/INR - ( 11 Jul 2023 10:42 )   PT: 16.3 sec;   INR: 1.40 ratio         PTT - ( 11 Jul 2023 10:42 )  PTT:32.6 sec          CAPILLARY BLOOD GLUCOSE      POCT Blood Glucose.: 159 mg/dL (12 Jul 2023 21:13)                RADIOLOGY & ADDITIONAL TESTS:      acetaminophen     Tablet .. 650 milliGRAM(s) Oral every 6 hours PRN  apixaban 5 milliGRAM(s) Oral every 12 hours  aspirin  chewable 81 milliGRAM(s) Oral daily  atorvastatin 10 milliGRAM(s) Oral at bedtime  buMETAnide 1 milliGRAM(s) Oral daily  dextrose 5%. 1000 milliLiter(s) IV Continuous <Continuous>  dextrose 5%. 1000 milliLiter(s) IV Continuous <Continuous>  dextrose 50% Injectable 25 Gram(s) IV Push once  dextrose 50% Injectable 25 Gram(s) IV Push once  dextrose 50% Injectable 12.5 Gram(s) IV Push once  dextrose Oral Gel 15 Gram(s) Oral once PRN  glucagon  Injectable 1 milliGRAM(s) IntraMuscular once  insulin lispro (ADMELOG) corrective regimen sliding scale   SubCutaneous at bedtime  insulin lispro (ADMELOG) corrective regimen sliding scale   SubCutaneous three times a day before meals  latanoprost 0.005% Ophthalmic Solution 1 Drop(s) Both EYES at bedtime  melatonin 3 milliGRAM(s) Oral at bedtime PRN  meropenem  IVPB 500 milliGRAM(s) IV Intermittent every 8 hours  meropenem  IVPB      metoprolol succinate  milliGRAM(s) Oral daily  nystatin    Suspension [id number] Unit(s) Oral three times a day  pantoprazole    Tablet 40 milliGRAM(s) Oral before breakfast  predniSONE   Tablet 10 milliGRAM(s) Oral three times a day  tacrolimus 3 milliGRAM(s) Oral two times a day  trimethoprim  160 mG/sulfamethoxazole 800 mG 1 Tablet(s) Oral <User Schedule>  valGANciclovir 450 milliGRAM(s) Oral <User Schedule>      HEALTH ISSUES - PROBLEM Dx:  Transplanted heart    Nicole syndrome    Receiving intravenous antibiotic treatment at home    Chronic atrial fibrillation    Hypertension    Chronic kidney disease (CKD)    Encounter for deep vein thrombosis (DVT) prophylaxis    Type 2 diabetes mellitus    Systemic inflammatory response syndrome (SIRS)

## 2023-07-13 NOTE — PROGRESS NOTE ADULT - PROBLEM SELECTOR PLAN 2
- appreciate transplant team recs  - tacro increased to 3 mg BID to match home dose (upon admission was started on 2 mg BID but confirmed outpatient dose is 3 mg BID)  -daily Tacro level (needs to be obtained ~30 min before AM dose given); goal tacro level 4-6, level this AM therapeutic  -transplant SW team follow up to establish plan for Safe discharge when ready - per PT, d/c home with home PT  - TTE today, f/u results

## 2023-07-14 DIAGNOSIS — D64.9 ANEMIA, UNSPECIFIED: ICD-10-CM

## 2023-07-14 LAB
ALBUMIN SERPL ELPH-MCNC: 4 G/DL — SIGNIFICANT CHANGE UP (ref 3.3–5)
ALP SERPL-CCNC: 61 U/L — SIGNIFICANT CHANGE UP (ref 40–120)
ALT FLD-CCNC: 8 U/L — LOW (ref 10–45)
ANION GAP SERPL CALC-SCNC: 15 MMOL/L — SIGNIFICANT CHANGE UP (ref 5–17)
AST SERPL-CCNC: 14 U/L — SIGNIFICANT CHANGE UP (ref 10–40)
BASOPHILS # BLD AUTO: 0.01 K/UL — SIGNIFICANT CHANGE UP (ref 0–0.2)
BASOPHILS NFR BLD AUTO: 0.1 % — SIGNIFICANT CHANGE UP (ref 0–2)
BILIRUB SERPL-MCNC: 0.4 MG/DL — SIGNIFICANT CHANGE UP (ref 0.2–1.2)
BUN SERPL-MCNC: 43 MG/DL — HIGH (ref 7–23)
CALCIUM SERPL-MCNC: 9.3 MG/DL — SIGNIFICANT CHANGE UP (ref 8.4–10.5)
CHLORIDE SERPL-SCNC: 103 MMOL/L — SIGNIFICANT CHANGE UP (ref 96–108)
CO2 SERPL-SCNC: 23 MMOL/L — SIGNIFICANT CHANGE UP (ref 22–31)
CREAT SERPL-MCNC: 1.53 MG/DL — HIGH (ref 0.5–1.3)
EGFR: 48 ML/MIN/1.73M2 — LOW
EOSINOPHIL # BLD AUTO: 0 K/UL — SIGNIFICANT CHANGE UP (ref 0–0.5)
EOSINOPHIL NFR BLD AUTO: 0 % — SIGNIFICANT CHANGE UP (ref 0–6)
FUNGITELL: 32 PG/ML — SIGNIFICANT CHANGE UP
GLUCOSE BLDC GLUCOMTR-MCNC: 141 MG/DL — HIGH (ref 70–99)
GLUCOSE BLDC GLUCOMTR-MCNC: 142 MG/DL — HIGH (ref 70–99)
GLUCOSE BLDC GLUCOMTR-MCNC: 147 MG/DL — HIGH (ref 70–99)
GLUCOSE BLDC GLUCOMTR-MCNC: 159 MG/DL — HIGH (ref 70–99)
GLUCOSE SERPL-MCNC: 140 MG/DL — HIGH (ref 70–99)
HCT VFR BLD CALC: 33.9 % — LOW (ref 39–50)
HGB BLD-MCNC: 10 G/DL — LOW (ref 13–17)
IMM GRANULOCYTES NFR BLD AUTO: 0.8 % — SIGNIFICANT CHANGE UP (ref 0–0.9)
LDH SERPL L TO P-CCNC: 261 U/L — HIGH (ref 50–242)
LYMPHOCYTES # BLD AUTO: 1.08 K/UL — SIGNIFICANT CHANGE UP (ref 1–3.3)
LYMPHOCYTES # BLD AUTO: 7.9 % — LOW (ref 13–44)
MAGNESIUM SERPL-MCNC: 2.2 MG/DL — SIGNIFICANT CHANGE UP (ref 1.6–2.6)
MCHC RBC-ENTMCNC: 29.1 PG — SIGNIFICANT CHANGE UP (ref 27–34)
MCHC RBC-ENTMCNC: 29.5 GM/DL — LOW (ref 32–36)
MCV RBC AUTO: 98.5 FL — SIGNIFICANT CHANGE UP (ref 80–100)
MONOCYTES # BLD AUTO: 0.7 K/UL — SIGNIFICANT CHANGE UP (ref 0–0.9)
MONOCYTES NFR BLD AUTO: 5.1 % — SIGNIFICANT CHANGE UP (ref 2–14)
NEUTROPHILS # BLD AUTO: 11.81 K/UL — HIGH (ref 1.8–7.4)
NEUTROPHILS NFR BLD AUTO: 86.1 % — HIGH (ref 43–77)
NRBC # BLD: 0 /100 WBCS — SIGNIFICANT CHANGE UP (ref 0–0)
PHOSPHATE SERPL-MCNC: 3.5 MG/DL — SIGNIFICANT CHANGE UP (ref 2.5–4.5)
PLATELET # BLD AUTO: 295 K/UL — SIGNIFICANT CHANGE UP (ref 150–400)
POTASSIUM SERPL-MCNC: 5 MMOL/L — SIGNIFICANT CHANGE UP (ref 3.5–5.3)
POTASSIUM SERPL-SCNC: 5 MMOL/L — SIGNIFICANT CHANGE UP (ref 3.5–5.3)
PROT SERPL-MCNC: 6.4 G/DL — SIGNIFICANT CHANGE UP (ref 6–8.3)
RBC # BLD: 3.44 M/UL — LOW (ref 4.2–5.8)
RBC # FLD: 19.4 % — HIGH (ref 10.3–14.5)
SODIUM SERPL-SCNC: 141 MMOL/L — SIGNIFICANT CHANGE UP (ref 135–145)
TACROLIMUS SERPL-MCNC: 6.9 NG/ML — SIGNIFICANT CHANGE UP
WBC # BLD: 13.71 K/UL — HIGH (ref 3.8–10.5)
WBC # FLD AUTO: 13.71 K/UL — HIGH (ref 3.8–10.5)

## 2023-07-14 PROCEDURE — 99232 SBSQ HOSP IP/OBS MODERATE 35: CPT

## 2023-07-14 RX ORDER — APIXABAN 2.5 MG/1
1 TABLET, FILM COATED ORAL
Qty: 0 | Refills: 0 | DISCHARGE
Start: 2023-07-14

## 2023-07-14 RX ORDER — INSULIN LISPRO 100/ML
7 VIAL (ML) SUBCUTANEOUS
Refills: 0 | DISCHARGE

## 2023-07-14 RX ORDER — TACROLIMUS 5 MG/1
3.5 CAPSULE ORAL
Qty: 0 | Refills: 0 | DISCHARGE
Start: 2023-07-14

## 2023-07-14 RX ORDER — MEROPENEM 1 G/30ML
500 INJECTION INTRAVENOUS
Refills: 0 | DISCHARGE

## 2023-07-14 RX ORDER — INSULIN GLARGINE 100 [IU]/ML
8 INJECTION, SOLUTION SUBCUTANEOUS
Refills: 0 | DISCHARGE

## 2023-07-14 RX ADMIN — TACROLIMUS 3.5 MILLIGRAM(S): 5 CAPSULE ORAL at 08:22

## 2023-07-14 RX ADMIN — LATANOPROST 1 DROP(S): 0.05 SOLUTION/ DROPS OPHTHALMIC; TOPICAL at 22:02

## 2023-07-14 RX ADMIN — ATORVASTATIN CALCIUM 10 MILLIGRAM(S): 80 TABLET, FILM COATED ORAL at 22:02

## 2023-07-14 RX ADMIN — PANTOPRAZOLE SODIUM 40 MILLIGRAM(S): 20 TABLET, DELAYED RELEASE ORAL at 06:26

## 2023-07-14 RX ADMIN — MEROPENEM 100 MILLIGRAM(S): 1 INJECTION INTRAVENOUS at 22:02

## 2023-07-14 RX ADMIN — TACROLIMUS 3.5 MILLIGRAM(S): 5 CAPSULE ORAL at 22:01

## 2023-07-14 RX ADMIN — Medication 10 MILLIGRAM(S): at 06:26

## 2023-07-14 RX ADMIN — MEROPENEM 100 MILLIGRAM(S): 1 INJECTION INTRAVENOUS at 14:26

## 2023-07-14 RX ADMIN — Medication 1: at 12:08

## 2023-07-14 RX ADMIN — APIXABAN 5 MILLIGRAM(S): 2.5 TABLET, FILM COATED ORAL at 06:26

## 2023-07-14 RX ADMIN — Medication 500000 UNIT(S): at 14:27

## 2023-07-14 RX ADMIN — Medication 100 MILLIGRAM(S): at 06:26

## 2023-07-14 RX ADMIN — Medication 10 MILLIGRAM(S): at 22:02

## 2023-07-14 RX ADMIN — Medication 500000 UNIT(S): at 06:26

## 2023-07-14 RX ADMIN — VALGANCICLOVIR 450 MILLIGRAM(S): 450 TABLET, FILM COATED ORAL at 08:22

## 2023-07-14 RX ADMIN — Medication 1 TABLET(S): at 08:21

## 2023-07-14 RX ADMIN — BUMETANIDE 1 MILLIGRAM(S): 0.25 INJECTION INTRAMUSCULAR; INTRAVENOUS at 06:26

## 2023-07-14 RX ADMIN — Medication 3 MILLIGRAM(S): at 22:01

## 2023-07-14 RX ADMIN — Medication 500000 UNIT(S): at 22:02

## 2023-07-14 RX ADMIN — Medication 10 MILLIGRAM(S): at 14:27

## 2023-07-14 RX ADMIN — MEROPENEM 100 MILLIGRAM(S): 1 INJECTION INTRAVENOUS at 06:28

## 2023-07-14 RX ADMIN — Medication 81 MILLIGRAM(S): at 12:08

## 2023-07-14 RX ADMIN — APIXABAN 5 MILLIGRAM(S): 2.5 TABLET, FILM COATED ORAL at 17:14

## 2023-07-14 NOTE — PROGRESS NOTE ADULT - TIME BILLING
- Generation of cardiovascular treatment plan.  - Coordination of care with primary team.
chart reviewing, history taking, physical exam, assessment and documentation, including speaking to specialist/SW/CM regarding the management.

## 2023-07-14 NOTE — PROGRESS NOTE ADULT - PROBLEM SELECTOR PLAN 3
Follows with Dr. Rosario. As an outpatient, discussed possibly slow wean prednisone.   - hematology following, appreciate recs  - will trend hemolysis markers daily: LDH, reticulocyte count, CMP, phos, fractionated direct and indirect bilirubin, haptoglobin  - Hb stable this admission at ~9  - c/w Prednisone 30mg QD  - continue protonix for GI protection on prednisone  - continue bactrim for PJP ppx - increased dose to bactrim DS TID (dose appears to have been reduced on prior admission for renal function, will monitor for hyperkalemia at increased dose) Follows with Dr. Rosario. As an outpatient, discussed possibly slow wean prednisone.   - hematology following, appreciate recs  - will trend hemolysis markers daily: LDH, reticulocyte count, CMP, phos, fractionated direct and indirect bilirubin, haptoglobin  - Hb stable this admission at ~9  - c/w Prednisone 30mg QD  - continue protonix for GI protection on prednisone  - continue bactrim for PJP ppx - increased dose to bactrim DS TID (dose appears to have been reduced on prior admission for renal function, will monitor for hyperkalemia at increased dose), re-decreased back to SS TID by transplant team

## 2023-07-14 NOTE — DISCHARGE NOTE PROVIDER - CARE PROVIDERS DIRECT ADDRESSES
,DirectAddress_Unknown,DirectAddress_Unknown,DirectAddress_Unknown ,[email or web address]

## 2023-07-14 NOTE — PROGRESS NOTE ADULT - ASSESSMENT
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

## 2023-07-14 NOTE — PROGRESS NOTE ADULT - PROBLEM SELECTOR PLAN 3
- WBC 15; Bld Cx 7/10 x1 set +Staph ?contaminate  - CTA unrevealing. CT Chest ? atlectasis vs. PNA; Pending Bronch/BAL  - Appreciate Transplant ID recs, to complete 7 days of meropenam - WBC 15; Bld Cx 7/10 x1 set +Staph ?contaminate  - CTA unrevealing. CT Chest ? atlectasis vs. PNA; Pending Bronch/BAL  - Appreciate Transplant ID recs, to complete 7 days of meropenam (ending 7/17)

## 2023-07-14 NOTE — DISCHARGE NOTE PROVIDER - HOSPITAL COURSE
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 Admitted with leukocytosis and tachycardia.    Attempted to obtain collateral from [facility] multiple times with no success. Per outpatient chart review, HF clinic has also tried with little success.    Pt admitted on meropenem, BCx/UCx NGTD, CT C/A/P remarkable for RLL finding, possible consolidation that may suggest pneumonia vs atelectasis. Followed by transplant ID and Cardiology, recommended pulm consult for possible bronch/BAL.    Per pulm, no role for bronch, low suspicion for pneumonia, finding has been seen on previous CT Chest imaging. Per transplant pharmacy, tacrolimus increased from 3 mg BID to 3.5 mg BID given tacro level of 4.2 (goal 4-6). Endo consulted prior to d/c for discharge insulin recs:    PT recommended home PT. Heme was consulted for possible inpatient rituxan/steroids given hemolytic anemia, deferred to outpatient f/u. SCr on admission normalized to ~1, increased to ~1.8, per outpatient records, baseline is 1.4-2.1.    Hemodynamically stable for d/c with close outpatient follow up and home care services. 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 Admitted with leukocytosis and tachycardia.    Attempted to obtain collateral from [facility] multiple times with no success. Per outpatient chart review, HF clinic has also tried with little success.    Pt admitted on meropenem, BCx 1/2 initially grew coag-negative staph but presumed contaminant, repeat blood cultures with no growth, UCx NGTD, CT C/A/P remarkable for RLL finding, possible consolidation that may suggest pneumonia vs atelectasis. Followed by transplant ID and Cardiology, recommended pulm consult for possible bronch/BAL.    Per pulm, no role for bronch, low suspicion for pneumonia, finding has been seen on previous CT Chest imaging. Per transplant pharmacy, tacrolimus increased from 3 mg BID to 3.5 mg BID given tacro level of 4.2 (goal 4-6). Tacro level monitored daily. Endo consulted prior to d/c for discharge insulin recs: recommended resuming home regimen upon d/c.     PT recommended home PT. Heme was consulted for possible inpatient rituxan/steroids given hemolytic anemia, deferred to outpatient f/u. SCr on admission normalized to ~1, increased to ~1.8, per outpatient records, baseline is 1.4-2.1.    Patient intermittently hyperkalemic, was started on standing lokelma 10 g PO QD.    Persistent leukocytosis over duration of hospitalization, but no other evidence infection, hemodynamically stable and asymptomatic s/p meropenem course x 7 days. On ***, patient deemed stable for d/c with close outpatient follow up and home care services. 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 Admitted with leukocytosis and tachycardia.    Attempted to obtain collateral from [facility] multiple times with no success. Per outpatient chart review, HF clinic has also tried with little success.    Pt admitted on meropenem, BCx 1/2 initially grew coag-negative staph but presumed contaminant, repeat blood cultures with no growth, UCx NGTD, CT C/A/P remarkable for RLL finding, possible consolidation that may suggest pneumonia vs atelectasis. Followed by transplant ID and Cardiology, recommended pulm consult for possible bronch/BAL.    Per pulm, no role for bronch, low suspicion for pneumonia, finding has been seen on previous CT Chest imaging. Per transplant pharmacy, tacrolimus increased from 3 mg BID to 3.5 mg BID given tacro level of 4.2 (goal 4-6). Tacro level monitored daily. Endo consulted prior to d/c for discharge insulin recs: Lantus 8U qhs, Admelog 7U before breakfast and 2U before lunch    PT recommended home PT. Heme was consulted for possible inpatient rituxan/steroids given hemolytic anemia, deferred to outpatient f/u. SCr on admission normalized to ~1, increased to ~1.8, per outpatient records, baseline is 1.4-2.1.    Persistent leukocytosis over duration of hospitalization, but no other evidence infection, hemodynamically stable and asymptomatic s/p meropenem course x 7 days and followed in hospital 3 days after. On 7/18/2023, patient deemed stable for d/c with close outpatient follow up and home care services.      Outpatient home medications sent through oupatient EMR by [facility]. Recommendations for follow up communicated with transplant team. Patient should follow up with Infectious Disease in one week to monitor blood cultures.

## 2023-07-14 NOTE — DISCHARGE NOTE PROVIDER - CARE PROVIDER_API CALL
Marvin Campbell  Cardiovascular Disease  [address] [place] [address]  Phone: [phone]  Fax: [phone]  Established Patient  Follow Up Time: 1-3 days    Ricardo Clemens  Endocrinology/Metab/Diabetes  [address] [facility] [address]  [place] [address]  Phone: [phone]  Fax: [phone]  Established Patient  Follow Up Time: 1-3 days    Sujey Lujan  Infectious Disease  [address] [place] [address]  Phone: [phone]  Fax: [phone]  Established Patient  Follow Up Time: 1-3 days   Marvin Campbell  Cardiovascular Disease  [address] [place] [address]  Phone: [phone]  Fax: [phone]  Established Patient  Follow Up Time: 1-3 days    Ricardo Clemens  Endocrinology/Metab/Diabetes  [address] [facility] [address]  [place] [address]  Phone: [phone]  Fax: [phone]  Established Patient  Follow Up Time: 1-3 days    Sujey Lujan  Infectious Disease  [address] [place] [address]  Phone: [phone]  Fax: [phone]  Established Patient  Follow Up Time: 1-3 days    Tao Rosario  Hematology  [address] [place] [address]  Phone: [phone]  Fax: [phone]  Established Patient  Follow Up Time: 1 week

## 2023-07-14 NOTE — DISCHARGE NOTE PROVIDER - NSDCCPCAREPLAN_GEN_ALL_CORE_FT
PRINCIPAL DISCHARGE DIAGNOSIS  Diagnosis: Leukocytosis  Assessment and Plan of Treatment: You were admitted to the hospital because there was a concern for a possible infection. You were found to have no obvious signs of infection in your body and you were thoroughly evaluated by our transplant cardiology and infectious disease teams given your high risk. Chest imaging showed a possible consolidation in your right lower lobe, however our pulmonary doctors determined this to be a chronic finding that is not an infection. Please follow up with your outpatient doctors including your transplant cardiologist and your endocrinologist. Please also follow up with infectious disease to make sure that you continue to show no signs of infection.

## 2023-07-14 NOTE — PROGRESS NOTE ADULT - PROBLEM SELECTOR PLAN 4
SCr 1.17 -> 1.11 -> 1.59. On previous admission, SCr as high as 3.71, decreased to 1.74 by d/c.   - unclear baseline, although appears to be developing some component of NORMAN  - trend SCr  - avoid nephrotoxic agents  - q12h BMP - s/p lokelma this AM for K 5.5 SCr 1.17 -> 1.11 -> 1.59. On previous admission, SCr as high as 3.71, decreased to 1.74 by d/c.   - unclear baseline, although appears to be developing some component of NORMAN  - trend SCr  - avoid nephrotoxic agents  - trend BMPs

## 2023-07-14 NOTE — PROGRESS NOTE ADULT - PROBLEM SELECTOR PLAN 2
- Currently on Prednisone 30mg QD  - Recommend Hematology consult; Follows with Dr. Rosario for hemolytic anemia; As an outpt discussed possibly slow wean of prednisone

## 2023-07-14 NOTE — DISCHARGE NOTE PROVIDER - NSDCFUADDAPPT_GEN_ALL_CORE_FT
APPTS ARE READY TO BE MADE: [X] YES    Best Family or Patient Contact (if needed):    Additional Information about above appointments (if needed):    1: Infectious disease within 1 week  2: Endocrine within 1 week  3: Transplant Cardiology    Other comments or requests:    APPTS ARE READY TO BE MADE: [X] YES    Best Family or Patient Contact (if needed):    Additional Information about above appointments (if needed):    1: Infectious disease within 1 week  2: Endocrine within 1 week  3: Transplant Cardiology  4: Hematology within 1 week    Other comments or requests:    APPTS ARE READY TO BE MADE: [X] YES    Best Family or Patient Contact (if needed):    Additional Information about above appointments (if needed):    1: Infectious disease within 1 week  2: Endocrine within 1 week  3: Transplant Cardiology  4: Hematology within 1 week    Other comments or requests:   Patient was scheduled for an appointment on 07/25 2:20p at [address] [place] [address] with Dr. Campbell. Patient/Caregiver was advised of appointment details.    Providers Tai Clemens, and [facility] offices were contacted to secure an appointment, however the office will follow up with the patient/caregiver directly.

## 2023-07-14 NOTE — PROGRESS NOTE ADULT - PROBLEM SELECTOR PLAN 1
Pt w/ leukocytosis and tachycardia on admission, started on IV meropenem on 7/8 at rehab for elevated WBC. No clear source of infx identified. CXR with R basilar atelectasis, CT chest & abdomen with RLL consolidation and R hemidiaphragm elevation, although this seems to be chronic when comparing to prior films. CXR showing R basilar atelectasis. UA unremarkable. RVP negative. Procal 0.11.     - ID following, appreciate recs  - trend WBC  - c/w meropenem for 1 week course (pending bronch recs)   - f/u blood, urine cultures  - pulmonology consult in AM for consideration of bronch given CT chest findings   - f/u quantiferon (pt with PPD in forearm from JP)  - f/u ESR, CRP  - f/u legionella urine ag, serum fungitell, galactomannan  - CT chest with RLL consolidation, appears to be chronic when compared with prior scans but no other convincing source of infection identified at this time - pulmonology consulted for consideration of bronchoscopy and further workup  - 1/2 blood culture sets growing coagulase negative staph, may represent contamination but will f/u ID recs Pt w/ leukocytosis and tachycardia on admission, started on IV meropenem on 7/8 at rehab for elevated WBC. No clear source of infx identified. CXR with R basilar atelectasis, CT chest & abdomen with RLL consolidation and R hemidiaphragm elevation, although this seems to be chronic when comparing to prior films. CXR showing R basilar atelectasis. UA unremarkable. RVP negative. Procal 0.11. WBC now downtrending, stable vitals throughout admission, no source of infection identified.  - ID following, appreciate recs  - trend WBC  - c/w meropenem x 1 week total course - last dose 10 pm today  - will monitor off abx through weekend per ID  - f/u quantiferon (pt with PPD in forearm from JP)  - f/u ESR, CRP  - f/u legionella urine ag, serum fungitell, galactomannan  - CT chest with RLL consolidation, appears to be chronic when compared with prior scans. Pulmonology consulted, not offering bronchoscopy/BAL due to chronicity of atelectasis, unlikely active infection  - 1/2 blood culture sets growing coagulase negative staph, likely contamination but will f/u ID recs

## 2023-07-14 NOTE — PROGRESS NOTE ADULT - SUBJECTIVE AND OBJECTIVE BOX
ADVANCED HEART FAILURE & TRANSPLANT  - PROGRESS NOTE  *To reach the NS1 Team from 8am to 5pm (MON-FRI), please call [phone],   _______________________________________________________________________________________________________     Subjective:    Medications:  acetaminophen     Tablet .. 650 milliGRAM(s) Oral every 6 hours PRN  apixaban 5 milliGRAM(s) Oral every 12 hours  aspirin  chewable 81 milliGRAM(s) Oral daily  atorvastatin 10 milliGRAM(s) Oral at bedtime  buMETAnide 1 milliGRAM(s) Oral daily  dextrose 5%. 1000 milliLiter(s) IV Continuous <Continuous>  dextrose 5%. 1000 milliLiter(s) IV Continuous <Continuous>  dextrose 50% Injectable 25 Gram(s) IV Push once  dextrose 50% Injectable 25 Gram(s) IV Push once  dextrose 50% Injectable 12.5 Gram(s) IV Push once  dextrose Oral Gel 15 Gram(s) Oral once PRN  glucagon  Injectable 1 milliGRAM(s) IntraMuscular once  insulin lispro (ADMELOG) corrective regimen sliding scale   SubCutaneous three times a day before meals  insulin lispro (ADMELOG) corrective regimen sliding scale   SubCutaneous at bedtime  latanoprost 0.005% Ophthalmic Solution 1 Drop(s) Both EYES at bedtime  melatonin 3 milliGRAM(s) Oral at bedtime PRN  meropenem  IVPB 500 milliGRAM(s) IV Intermittent every 8 hours  meropenem  IVPB      metoprolol succinate  milliGRAM(s) Oral daily  nystatin    Suspension 862142 Unit(s) Oral three times a day  pantoprazole    Tablet 40 milliGRAM(s) Oral before breakfast  predniSONE   Tablet 10 milliGRAM(s) Oral three times a day  tacrolimus 3.5 milliGRAM(s) Oral <User Schedule>  trimethoprim   80 mG/sulfamethoxazole 400 mG 1 Tablet(s) Oral <User Schedule>  valGANciclovir 450 milliGRAM(s) Oral <User Schedule>      Physical Exam:    Vitals:  Vital Signs Last 24 Hours  T(C): 36.4 (23 @ 01:51), Max: 36.4 (23 @ 09:26)  HR: 107 (23 @ 01:51) (92 - 107)  BP: 135/85 (23 @ 06:20) (124/87 - 138/88)  RR: 18 (23 @ 01:51) (18 - 18)  SpO2: 100% (23 @ 01:51) (97% - 100%)    Weight in k.3 ( @ 11:20)    I&O's Summary    2023 07:01  -  2023 07:00  --------------------------------------------------------  IN: 240 mL / OUT: 500 mL / NET: -260 mL        Tele:    General: No distress. Comfortable.  HEENT: EOM intact.  Neck: Neck supple. JVP not elevated. No masses  Chest: Clear to auscultation bilaterally  CV: Normal S1 and S2. No murmurs, rub, or gallops. Radial pulses normal.  Abdomen: Soft, non-distended, non-tender  Skin: No rashes or skin breakdown  Extremities: No LE edema  Neurology: Alert and oriented times three. Sensation intact  Psych: Affect normal    Labs:                        10.0   13.71 )-----------( 295      ( 2023 07:04 )             33.9     14    141  |  103  |  43<H>  ----------------------------<  140<H>  5.0   |  23  |  1.53<H>    Ca    9.3      2023 07:03  Phos  3.5     14  Mg     2.2         TPro  6.4  /  Alb  4.0  /  TBili  0.4  /  DBili  x   /  AST  14  /  ALT  8<L>  /  AlkPhos  61                Lactate Dehydrogenase, Serum: 261 U/L ( @ 07:03)  Lactate Dehydrogenase, Serum: 304 U/L ( @ 10:26)  Lactate Dehydrogenase, Serum: 266 U/L ( @ 07:04)

## 2023-07-14 NOTE — DISCHARGE NOTE PROVIDER - NSDCMRMEDTOKEN_GEN_ALL_CORE_FT
apixaban 5 mg oral tablet: 1 tab(s) orally every 12 hours  ascorbic acid 500 mg/mL injection solution: Infuse 5 grams by IV every week for 4 weeks  aspirin 81 mg oral tablet, chewable: 1 tab(s) orally once a day  atorvastatin 10 mg oral tablet: 1 orally once a day (at bedtime)  bumetanide 1 mg oral tablet: 1 tab(s) orally once a day  latanoprost 0.005% ophthalmic solution: 1 gram(s) in each eye once a day (at bedtime)  metoprolol succinate 100 mg oral tablet, extended release: 1 tab(s) orally once a day  nystatin 100,000 units/mL oral suspension: 5 milliliter(s) orally 3 times a day  predniSONE 10 mg oral tablet: 1 tab(s) orally 3 times a day  sulfamethoxazole-trimethoprim 400 mg-80 mg oral tablet: 1 tab(s) orally 3 times a week  tacrolimus 0.5 mg oral capsule: 3.5 milligram(s) orally 2 times a day  valGANciclovir 450 mg oral tablet: 1 tab(s) orally 2 times a week  Venofer 20 mg/mL intravenous solution: 100 intravenously 3 times a week   apixaban 5 mg oral tablet: 1 tab(s) orally every 12 hours  aspirin 81 mg oral tablet, chewable: 1 tab(s) orally once a day  bumetanide 1 mg oral tablet: 1 tab(s) orally once a day  Lantus Solostar Pen 100 units/mL subcutaneous solution: 8 subcutaneous once a day (at bedtime)  latanoprost 0.005% ophthalmic solution: 1 gram(s) in each eye once a day (at bedtime)  metoprolol succinate 100 mg oral tablet, extended release: 1 tab(s) orally once a day  NovoLOG FlexPen 100 units/mL injectable solution: 7 unit(s) injectable once a day before breakfast  NovoLOG FlexPen 100 units/mL injectable solution: 2 unit(s) injectable once a day before lunch  nystatin 100,000 units/mL oral suspension: 5 milliliter(s) orally 3 times a day  pravastatin 20 mg oral tablet: 1 orally once a day  predniSONE 10 mg oral tablet: 3 tab(s) orally once a day  sulfamethoxazole-trimethoprim 400 mg-80 mg oral tablet: 1 tab(s) orally 3 times a week  tacrolimus 0.5 mg oral capsule: 1 cap(s) orally 2 times a day Tacrolimus 3.5mg twice daily  tacrolimus 1 mg oral capsule: 3 cap(s) orally 2 times a day Tacrolimus 3.5mg twice daily  valGANciclovir 450 mg oral tablet: 1 tab(s) orally 2 times a week

## 2023-07-14 NOTE — DISCHARGE NOTE PROVIDER - NSDCCPTREATMENT_GEN_ALL_CORE_FT
PRINCIPAL PROCEDURE  Procedure: Echo 2D  Findings and Treatment:   < end of copied text >  CONCLUSIONS:      1. Focused TTE performed to evaluated left ventricular and right ventricular systolic function.   2. Septal motion is abnormal consistent with previous cardiac surgery. The left ventricular systolic function is normal with an ejection fraction visually estimated at 55 %.   3. The right ventricle is not well visualized. grossly mild-moderately enlarged right ventricular cavity size, normal right ventricular wall thickness and borderline reduced right ventricular systolic function.   4. Compared to the transthoracic echocardiogram performed on 6/16/2023 findings are similar on today's study.  < from: TTE Limited W or WO Ultrasound Enhancing Agent (07.12.23 @ 07:37) >        SECONDARY PROCEDURE  Procedure: CT chest, abdomen and pelvis  Findings and Treatment:   < end of copied text >  FINDINGS:  CHEST:  LUNGS AND LARGE AIRWAYS: Patent central airways. Pulmonary emphysema.   Right lower lobe consolidation, consistent with atelectasis/pneumonia.  PLEURA: No pleural effusion. Mild elevation of the right hemidiaphragm.  VESSELS: Within normal limits.  HEART: Mild atherosclerotic calcifications. No pericardial effusion.  MEDIASTINUM AND DARIEN: No lymphadenopathy.  CHEST WALL AND LOWER NECK: Mild bilateral gynecomastia.  ABDOMEN AND PELVIS:  LIVER: Within normal limits.  BILE DUCTS: Normal caliber.  GALLBLADDER: Cholelithiasis.  SPLEEN: Within normal limits.  PANCREAS: Within normal limits.  ADRENALS: Within normal limits.  KIDNEYS/URETERS: Bilateral renalcysts and cortical scarring. No   hydronephrosis.  BLADDER: Within normal limits.  REPRODUCTIVE ORGANS: Prostate within normal limits.  BOWEL: No bowel obstruction. Appendix is normal.  PERITONEUM: No ascites.  VESSELS: Atherosclerotic calcifications.  RETROPERITONEUM/LYMPH NODES: No lymphadenopathy.  ABDOMINAL WALL: Fat-containing umbilical hernia.  BONES: Left hip arthroplasty. Degenerative changes.  IMPRESSION:  *  Elevation of the right hemidiaphragm with right lower lobe   consolidation consistent with pneumonia and/or atelectasis, similar to   findings present on prior exam.  *  No evidence of acute abnormality in the abdomen and pelvis.  --- End of Report ---  < from: CT Abdomen and Pelvis w/ IV Cont (07.11.23 @ 08:57) >

## 2023-07-14 NOTE — PROGRESS NOTE ADULT - SUBJECTIVE AND OBJECTIVE BOX
BILLY RUSSELL  73y  Male    Patient is a 73y old  Male who presents with a chief complaint of sepsis w/u (14 Jul 2023 01:23)      INTERVAL HPI/OVERNIGHT EVENTS:      T(C): 36.4 (07-14-23 @ 01:51), Max: 36.4 (07-13-23 @ 09:26)  HR: 107 (07-14-23 @ 01:51) (92 - 107)  BP: 138/88 (07-14-23 @ 01:51) (124/87 - 138/88)  RR: 18 (07-14-23 @ 01:51) (18 - 18)  SpO2: 100% (07-14-23 @ 01:51) (97% - 100%)  Wt(kg): --Vital Signs Last 24 Hrs  T(C): 36.4 (14 Jul 2023 01:51), Max: 36.4 (13 Jul 2023 09:26)  T(F): 97.6 (14 Jul 2023 01:51), Max: 97.6 (13 Jul 2023 09:26)  HR: 107 (14 Jul 2023 01:51) (92 - 107)  BP: 138/88 (14 Jul 2023 01:51) (124/87 - 138/88)  BP(mean): --  RR: 18 (14 Jul 2023 01:51) (18 - 18)  SpO2: 100% (14 Jul 2023 01:51) (97% - 100%)    Parameters below as of 14 Jul 2023 01:51  Patient On (Oxygen Delivery Method): room air        PHYSICAL EXAM:  GENERAL: NAD, laying comfortably in bed  HEAD:  Atraumatic, Normocephalic  EYES: EOMI, PERRLA, conjunctiva and sclera clear  ENMT: No tonsillar erythema, exudates, or enlargement; Moist mucous membranes  NECK: Supple, No JVD  NERVOUS SYSTEM:  Alert & Oriented X3, Good concentration; Motor Strength grossly intact in B/L upper and lower extremities;   CHEST/LUNG: Clear to auscultation bilaterally; No rales, rhonchi, wheezing, or rubs  HEART: Regular rate and rhythm; No murmurs, rubs, or gallops  ABDOMEN: Soft, Nontender, Nondistended; Bowel sounds present  EXTREMITIES:  No clubbing, cyanosis, or edema  LYMPH: No lymphadenopathy noted  SKIN: No rashes or lesions    Consultant(s) Notes Reviewed:  [x ] YES  [ ] NO  Care Discussed with Consultants/Other Providers [ x] YES  [ ] NO    LABS:                        10.3   15.56 )-----------( 290      ( 13 Jul 2023 10:26 )             35.0     07-13    138  |  99  |  48<H>  ----------------------------<  116<H>  4.8   |  24  |  1.60<H>    Ca    9.4      13 Jul 2023 10:26  Phos  3.8     07-13  Mg     2.3     07-13    TPro  7.3  /  Alb  4.2  /  TBili  0.6  /  DBili  0.2  /  AST  14  /  ALT  9<L>  /  AlkPhos  56  07-13        Urinalysis Basic - ( 13 Jul 2023 10:26 )    Color: x / Appearance: x / SG: x / pH: x  Gluc: 116 mg/dL / Ketone: x  / Bili: x / Urobili: x   Blood: x / Protein: x / Nitrite: x   Leuk Esterase: x / RBC: x / WBC x   Sq Epi: x / Non Sq Epi: x / Bacteria: x      CAPILLARY BLOOD GLUCOSE      POCT Blood Glucose.: 148 mg/dL (13 Jul 2023 21:31)  POCT Blood Glucose.: 131 mg/dL (13 Jul 2023 17:00)  POCT Blood Glucose.: 137 mg/dL (13 Jul 2023 12:18)  POCT Blood Glucose.: 145 mg/dL (13 Jul 2023 07:59)        Urinalysis Basic - ( 13 Jul 2023 10:26 )    Color: x / Appearance: x / SG: x / pH: x  Gluc: 116 mg/dL / Ketone: x  / Bili: x / Urobili: x   Blood: x / Protein: x / Nitrite: x   Leuk Esterase: x / RBC: x / WBC x   Sq Epi: x / Non Sq Epi: x / Bacteria: x        RADIOLOGY & ADDITIONAL TESTS:    Imaging Personally Reviewed:  [ ] YES  [ ] NO    HEALTH ISSUES - PROBLEM Dx:  Transplanted heart    Nicole syndrome    Receiving intravenous antibiotic treatment at home    Chronic atrial fibrillation    Hypertension    Chronic kidney disease (CKD)    Encounter for deep vein thrombosis (DVT) prophylaxis    Type 2 diabetes mellitus    Systemic inflammatory response syndrome (SIRS)    Anemia         BILLY RUSSELL  73y  Male    Patient is a 73y old  Male who presents with a chief complaint of sepsis w/u (14 Jul 2023 01:23)    INTERVAL HPI/OVERNIGHT EVENTS:  - NAEON, HDS and afebrile  - feeling well this AM, reportedly at baseline. Denies fevers, chills, chest pain, cough, dyspnea, abdominal pain, n/v/d\  - tacrolimus increased to 3.5 BID yesterday based on AM tacro level, this AM tacro level 6.9     T(C): 36.4 (07-14-23 @ 01:51), Max: 36.4 (07-13-23 @ 09:26)  HR: 107 (07-14-23 @ 01:51) (92 - 107)  BP: 138/88 (07-14-23 @ 01:51) (124/87 - 138/88)  RR: 18 (07-14-23 @ 01:51) (18 - 18)  SpO2: 100% (07-14-23 @ 01:51) (97% - 100%)  Wt(kg): --Vital Signs Last 24 Hrs  T(C): 36.4 (14 Jul 2023 01:51), Max: 36.4 (13 Jul 2023 09:26)  T(F): 97.6 (14 Jul 2023 01:51), Max: 97.6 (13 Jul 2023 09:26)  HR: 107 (14 Jul 2023 01:51) (92 - 107)  BP: 138/88 (14 Jul 2023 01:51) (124/87 - 138/88)  BP(mean): --  RR: 18 (14 Jul 2023 01:51) (18 - 18)  SpO2: 100% (14 Jul 2023 01:51) (97% - 100%)    Parameters below as of 14 Jul 2023 01:51  Patient On (Oxygen Delivery Method): room air        PHYSICAL EXAM:    GENERAL: NAD, non-toxic appearing, sitting upright in bed  HEAD:  Atraumatic, Normocephalic  EYES: EOMI, PERRLA, conjunctiva and sclera clear, no jaundice   ENMT: Moist mucous membranes  HEART: Regular rate and rhythm; normal S1/S2  RESPIRATORY: CTA B/L, No W/R/R  ABDOMEN: Soft, Nontender, Nondistended.  NEUROLOGY: A&O x4  EXTREMITIES:  2+ Peripheral Pulses, No clubbing or cyanosis. Mild LE pitting edema, improved  SKIN: no rashes or lesions      Consultant(s) Notes Reviewed:  [x ] YES  [ ] NO  Care Discussed with Consultants/Other Providers [ x] YES  [ ] NO    LABS:                        10.3   15.56 )-----------( 290      ( 13 Jul 2023 10:26 )             35.0     07-13    138  |  99  |  48<H>  ----------------------------<  116<H>  4.8   |  24  |  1.60<H>    Ca    9.4      13 Jul 2023 10:26  Phos  3.8     07-13  Mg     2.3     07-13    TPro  7.3  /  Alb  4.2  /  TBili  0.6  /  DBili  0.2  /  AST  14  /  ALT  9<L>  /  AlkPhos  56  07-13        Urinalysis Basic - ( 13 Jul 2023 10:26 )    Color: x / Appearance: x / SG: x / pH: x  Gluc: 116 mg/dL / Ketone: x  / Bili: x / Urobili: x   Blood: x / Protein: x / Nitrite: x   Leuk Esterase: x / RBC: x / WBC x   Sq Epi: x / Non Sq Epi: x / Bacteria: x      CAPILLARY BLOOD GLUCOSE      POCT Blood Glucose.: 148 mg/dL (13 Jul 2023 21:31)  POCT Blood Glucose.: 131 mg/dL (13 Jul 2023 17:00)  POCT Blood Glucose.: 137 mg/dL (13 Jul 2023 12:18)  POCT Blood Glucose.: 145 mg/dL (13 Jul 2023 07:59)        Urinalysis Basic - ( 13 Jul 2023 10:26 )    Color: x / Appearance: x / SG: x / pH: x  Gluc: 116 mg/dL / Ketone: x  / Bili: x / Urobili: x   Blood: x / Protein: x / Nitrite: x   Leuk Esterase: x / RBC: x / WBC x   Sq Epi: x / Non Sq Epi: x / Bacteria: x        RADIOLOGY & ADDITIONAL TESTS:    Imaging Personally Reviewed:  [ ] YES  [ ] NO    HEALTH ISSUES - PROBLEM Dx:  Transplanted heart    Nicole syndrome    Receiving intravenous antibiotic treatment at home    Chronic atrial fibrillation    Hypertension    Chronic kidney disease (CKD)    Encounter for deep vein thrombosis (DVT) prophylaxis    Type 2 diabetes mellitus    Systemic inflammatory response syndrome (SIRS)    Anemia

## 2023-07-14 NOTE — PROGRESS NOTE ADULT - PROBLEM SELECTOR PLAN 5
Pt takes admelog 7u tid and glargine 8u at bedtime  - continuing home regimen, CBGs mostly 120s-180s this admission Pt previously on admelog 7u tid and glargine 8u at bedtime, however outpatient med rec reveals pt not recently on any insulin  - not on any standing insulin this admission, CBGs at goal  - endocrine consulted for optimization of outpatient insulin regimen

## 2023-07-14 NOTE — PROGRESS NOTE ADULT - ATTENDING COMMENTS
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   Feeling well, denies any localizing symptoms. decreased BS at R base, +b/l LE edema L>R     - CT C/A/P with ?RLL consolidation, which was noted on prior imaging as well, ?PNA - pulm consulted, appears chronic on imaging, no role for bronch   - f/u culture data --> 1/2 Bcx +CoNS, suspect contamination  - ID following, plan to c/w meropenem for 7d total and monitor off afterwards   - tacr increased to 3.5mg BID by HF team, tacro level this AM 6.2 --> f/u HF regarding tacro dosing   - c/w prednisone 30mg for Nicole syndrome, hematology input appreciated   -  HF following, appears near euvolemia - c/w current bumex dose   - Cr fluctuating, unclear if NORMAN vs CKD - Cr stable ~1.6 today, c/t monitor, check lytes   - d/c planning when cleared by ID     #leukocytosis, SIRS  #s/p heart transplant   #Nicole syndrome  #ONRMAN  #T2DM   #chronic Afib.

## 2023-07-14 NOTE — PROGRESS NOTE ADULT - PROBLEM SELECTOR PLAN 2
- appreciate transplant team recs  - tacro increased to 3 mg BID to match home dose (upon admission was started on 2 mg BID but confirmed outpatient dose is 3 mg BID)  -daily Tacro level (needs to be obtained ~30 min before AM dose given); goal tacro level 4-6, level this AM therapeutic  -transplant SW team follow up to establish plan for Safe discharge when ready - per PT, d/c home with home PT  - TTE today, f/u results - appreciate transplant team recs  - tacro increased to 3 mg BID to match home dose (upon admission was started on 2 mg BID but confirmed outpatient dose is 3 mg BID), then increased to 3.5 mg BID based on subtherapeutic tacro level  -daily Tacro level (needs to be obtained ~30 min before AM dose given); goal tacro level 4-6  -transplant SW team follow up to establish plan for Safe discharge when ready - per PT, d/c home with home PT  - repeat TTE stable

## 2023-07-14 NOTE — PROGRESS NOTE ADULT - PROBLEM SELECTOR PLAN 1
Immunosuppression  - c/w tacro 2mg BID;   - Please send daily Tacro level 30MINS before AM dose; goal tacro level 6-8   PPPx:  Currently on nystatin, Valcyte and bactrim   - heartcare normal 5/17,  - Will have transplant SW team follow up to establish plan for Safe discharge when ready as he WILL NOT be Safe to going back to prior rehab facility  -Please Consult Psych for behavioral disturbances

## 2023-07-14 NOTE — DISCHARGE NOTE PROVIDER - PROVIDER TOKENS
PROVIDER:[[id number]],FOLLOWUP:[1-3 days],ESTABLISHEDPATIENT:[T]],PROVIDER:[[id number]],FOLLOWUP:[1-3 days],ESTABLISHEDPATIENT:[T]],PROVIDER:[[id number]],FOLLOWUP:[1-3 days],ESTABLISHEDPATIENT:[T]] PROVIDER:[TOKEN:[[id number]],FOLLOWUP:[1-3 days],ESTABLISHEDPATIENT:[T]],PROVIDER:[[id number]],FOLLOWUP:[1-3 days],ESTABLISHEDPATIENT:[T]],PROVIDER:[[id number]],FOLLOWUP:[1-3 days],ESTABLISHEDPATIENT:[T]],PROVIDER:[[id number]],FOLLOWUP:[1 week],ESTABLISHEDPATIENT:[T]]

## 2023-07-14 NOTE — DISCHARGE NOTE PROVIDER - NSDCFUSCHEDAPPT_GEN_ALL_CORE_FT
[address]  Scheduled Appointment: 08/15/2023    Ricardo Clemens  [address]  Scheduled Appointment: 08/15/2023

## 2023-07-15 LAB
ALBUMIN SERPL ELPH-MCNC: 4 G/DL — SIGNIFICANT CHANGE UP (ref 3.3–5)
ALP SERPL-CCNC: 67 U/L — SIGNIFICANT CHANGE UP (ref 40–120)
ALT FLD-CCNC: 10 U/L — SIGNIFICANT CHANGE UP (ref 10–45)
ANION GAP SERPL CALC-SCNC: 14 MMOL/L — SIGNIFICANT CHANGE UP (ref 5–17)
APPEARANCE UR: CLEAR — SIGNIFICANT CHANGE UP
AST SERPL-CCNC: 16 U/L — SIGNIFICANT CHANGE UP (ref 10–40)
BILIRUB SERPL-MCNC: 0.5 MG/DL — SIGNIFICANT CHANGE UP (ref 0.2–1.2)
BILIRUB UR-MCNC: NEGATIVE — SIGNIFICANT CHANGE UP
BUN SERPL-MCNC: 48 MG/DL — HIGH (ref 7–23)
CALCIUM SERPL-MCNC: 8.8 MG/DL — SIGNIFICANT CHANGE UP (ref 8.4–10.5)
CHLORIDE SERPL-SCNC: 100 MMOL/L — SIGNIFICANT CHANGE UP (ref 96–108)
CO2 SERPL-SCNC: 24 MMOL/L — SIGNIFICANT CHANGE UP (ref 22–31)
COLOR SPEC: COLORLESS — SIGNIFICANT CHANGE UP
CREAT ?TM UR-MCNC: 32 MG/DL — SIGNIFICANT CHANGE UP
CREAT SERPL-MCNC: 1.61 MG/DL — HIGH (ref 0.5–1.3)
CULTURE RESULTS: SIGNIFICANT CHANGE UP
DIFF PNL FLD: NEGATIVE — SIGNIFICANT CHANGE UP
EGFR: 45 ML/MIN/1.73M2 — LOW
GALACTOMANNAN AG SERPL-ACNC: 0.03 INDEX — SIGNIFICANT CHANGE UP (ref 0–0.49)
GLUCOSE BLDC GLUCOMTR-MCNC: 127 MG/DL — HIGH (ref 70–99)
GLUCOSE BLDC GLUCOMTR-MCNC: 175 MG/DL — HIGH (ref 70–99)
GLUCOSE BLDC GLUCOMTR-MCNC: 187 MG/DL — HIGH (ref 70–99)
GLUCOSE BLDC GLUCOMTR-MCNC: 225 MG/DL — HIGH (ref 70–99)
GLUCOSE SERPL-MCNC: 180 MG/DL — HIGH (ref 70–99)
GLUCOSE UR QL: NEGATIVE — SIGNIFICANT CHANGE UP
HAPTOGLOB SERPL-MCNC: 204 MG/DL — HIGH (ref 34–200)
HCT VFR BLD CALC: 35.6 % — LOW (ref 39–50)
HGB BLD-MCNC: 10.3 G/DL — LOW (ref 13–17)
KETONES UR-MCNC: NEGATIVE — SIGNIFICANT CHANGE UP
LDH SERPL L TO P-CCNC: 313 U/L — HIGH (ref 50–242)
LEGIONELLA AG UR QL: NEGATIVE — SIGNIFICANT CHANGE UP
LEUKOCYTE ESTERASE UR-ACNC: NEGATIVE — SIGNIFICANT CHANGE UP
MCHC RBC-ENTMCNC: 28.3 PG — SIGNIFICANT CHANGE UP (ref 27–34)
MCHC RBC-ENTMCNC: 28.9 GM/DL — LOW (ref 32–36)
MCV RBC AUTO: 97.8 FL — SIGNIFICANT CHANGE UP (ref 80–100)
NITRITE UR-MCNC: NEGATIVE — SIGNIFICANT CHANGE UP
NRBC # BLD: 0 /100 WBCS — SIGNIFICANT CHANGE UP (ref 0–0)
PH UR: 6 — SIGNIFICANT CHANGE UP (ref 5–8)
PLATELET # BLD AUTO: 317 K/UL — SIGNIFICANT CHANGE UP (ref 150–400)
POTASSIUM SERPL-MCNC: 5.2 MMOL/L — SIGNIFICANT CHANGE UP (ref 3.5–5.3)
POTASSIUM SERPL-SCNC: 5.2 MMOL/L — SIGNIFICANT CHANGE UP (ref 3.5–5.3)
PROT SERPL-MCNC: 6.6 G/DL — SIGNIFICANT CHANGE UP (ref 6–8.3)
PROT UR-MCNC: NEGATIVE — SIGNIFICANT CHANGE UP
RBC # BLD: 3.64 M/UL — LOW (ref 4.2–5.8)
RBC # FLD: 19.3 % — HIGH (ref 10.3–14.5)
SODIUM SERPL-SCNC: 138 MMOL/L — SIGNIFICANT CHANGE UP (ref 135–145)
SODIUM UR-SCNC: 56 MMOL/L — SIGNIFICANT CHANGE UP
SP GR SPEC: 1.01 — LOW (ref 1.01–1.02)
SPECIMEN SOURCE: SIGNIFICANT CHANGE UP
TACROLIMUS SERPL-MCNC: 6.6 NG/ML — SIGNIFICANT CHANGE UP
UROBILINOGEN FLD QL: NEGATIVE — SIGNIFICANT CHANGE UP
UUN UR-MCNC: 432 MG/DL — SIGNIFICANT CHANGE UP
WBC # BLD: 16.99 K/UL — HIGH (ref 3.8–10.5)
WBC # FLD AUTO: 16.99 K/UL — HIGH (ref 3.8–10.5)

## 2023-07-15 PROCEDURE — 99232 SBSQ HOSP IP/OBS MODERATE 35: CPT | Mod: GC

## 2023-07-15 PROCEDURE — 99232 SBSQ HOSP IP/OBS MODERATE 35: CPT

## 2023-07-15 RX ORDER — TACROLIMUS 5 MG/1
3.5 CAPSULE ORAL
Refills: 0 | Status: DISCONTINUED | OUTPATIENT
Start: 2023-07-15 | End: 2023-07-18

## 2023-07-15 RX ADMIN — Medication 500000 UNIT(S): at 21:12

## 2023-07-15 RX ADMIN — Medication 10 MILLIGRAM(S): at 05:33

## 2023-07-15 RX ADMIN — PANTOPRAZOLE SODIUM 40 MILLIGRAM(S): 20 TABLET, DELAYED RELEASE ORAL at 05:33

## 2023-07-15 RX ADMIN — TACROLIMUS 3.5 MILLIGRAM(S): 5 CAPSULE ORAL at 09:18

## 2023-07-15 RX ADMIN — TACROLIMUS 3.5 MILLIGRAM(S): 5 CAPSULE ORAL at 19:59

## 2023-07-15 RX ADMIN — Medication 1: at 12:10

## 2023-07-15 RX ADMIN — Medication 1: at 08:15

## 2023-07-15 RX ADMIN — BUMETANIDE 1 MILLIGRAM(S): 0.25 INJECTION INTRAMUSCULAR; INTRAVENOUS at 05:33

## 2023-07-15 RX ADMIN — LATANOPROST 1 DROP(S): 0.05 SOLUTION/ DROPS OPHTHALMIC; TOPICAL at 21:13

## 2023-07-15 RX ADMIN — Medication 100 MILLIGRAM(S): at 05:33

## 2023-07-15 RX ADMIN — APIXABAN 5 MILLIGRAM(S): 2.5 TABLET, FILM COATED ORAL at 05:32

## 2023-07-15 RX ADMIN — Medication 10 MILLIGRAM(S): at 14:42

## 2023-07-15 RX ADMIN — APIXABAN 5 MILLIGRAM(S): 2.5 TABLET, FILM COATED ORAL at 17:26

## 2023-07-15 RX ADMIN — Medication 81 MILLIGRAM(S): at 11:44

## 2023-07-15 RX ADMIN — ATORVASTATIN CALCIUM 10 MILLIGRAM(S): 80 TABLET, FILM COATED ORAL at 21:12

## 2023-07-15 RX ADMIN — Medication 10 MILLIGRAM(S): at 21:12

## 2023-07-15 RX ADMIN — Medication 500000 UNIT(S): at 05:33

## 2023-07-15 RX ADMIN — Medication 500000 UNIT(S): at 14:41

## 2023-07-15 NOTE — PROGRESS NOTE ADULT - ASSESSMENT
67 year old man with ACC/AHA stage D chronic systolic heart failure due to NICM s/p HM2 LVAD 6/17 c/b pump thrombosis now s/p heart transplant on 2/23 (CMV D-/R+ and Toxo D-/R+), with post-op course c/b primary graft dysfunction, initially thought to be immune-mediated due to positive B-cell flow (negative CDC cross match) and received plasmapharesis/IVIg x2 with improvement in LV function. His course has been complicated by bilateral IJ/subclavian thrombi (on lovenox). Given persistent C4D staining and decline in LV function in April he was treated with plasmapharesis/IVIg and rituxan (received 2 doses, last 5/9) for suspected AMR (noted to have non-HLA Ab to angiotension II). Has since started Mavyret for HCV treatment. Was admitted for hyperglycemia, hyponatremia and acute kidney injury on 5/23 which has improved with insulin therapy. Noted to have papular lesions on his arms and a bullous lesion of the abdomen that were new. Labs also notable for continued leukopenia.     Immunosuppression  - Tacro level 11.0; c/w tacro 6.5mg/6.5mg PO (increased 5/30)  - Continued leukopenia on cellcept 500 mg q12 PO, check CBC with differential to r/o neutropenia  - on pred 7.5 q12; continue for now    Graft function   - EF 48-50% on 5/24 (unchanged from TTE 5/9)    CAV PPx  - on ASA 81 mg daily  - on lipitor 10 mg daily    Antimicrobial PPx  - on nystatin; ? will consider d/c'ing given >1 month out per protocol  - continue mepron 1500 mg daily  - on valcyte 450 mg daily increase to BID    Diabetes  - new onset likely 2/2 steroids/prograf  - appreciate endo assistance; c/w home DM education  - BGs improved    Renal dysfunction   - improved  - continue monitoring for now  - on sodium bicarb for hyperkalemia which has resolved    Papular lesions  - Appear to have spontaneously occurred, does not appear to be related to trauma or Lovenox injection, unclear etiology  - Check coags and factor Xa level 4 hours post next Lovenox dose  - Await culture results, would check HIV    B/L UE DVTs  - Repeat upper extremity Dopplers    Please call me at [phone] for any further questions up till 5 pm. For after hours, please call the covering cardiology [place] [phone] for any further assistance.    Please call me at [phone] for any further questions up till 5 pm. For after hours, please call the covering cardiology [place] [phone] for any further assistance. 67 year old man with ACC/AHA stage D chronic systolic heart failure due to NICM s/p HM2 LVAD 6/17 c/b pump thrombosis now s/p heart transplant on 2/23 (CMV D-/R+ and Toxo D-/R+), with post-op course c/b primary graft dysfunction, initially thought to be immune-mediated due to positive B-cell flow (negative CDC cross match) and received plasmapharesis/IVIg x2 with improvement in LV function. His course has been complicated by bilateral IJ/subclavian thrombi (on lovenox). Given persistent C4D staining and decline in LV function in April he was treated with plasmapharesis/IVIg and rituxan (received 2 doses, last 5/9) for suspected AMR (noted to have non-HLA Ab to angiotension II). Has since started Mavyret for HCV treatment. Was admitted for hyperglycemia, hyponatremia and acute kidney injury on 5/23 which has improved with insulin therapy. Noted to have papular lesions on his arms and a bullous lesion of the abdomen that were new. Labs also notable for continued leukopenia.     Immunosuppression  - Tacro level 11.0; c/w tacro 6.5mg/6.5mg PO (increased 5/30)  - Continued leukopenia on cellcept 500 mg q12 PO, check CBC with differential to r/o neutropenia  - on pred 7.5 q12; continue for now    Graft function   - EF 48-50% on 5/24 (unchanged from TTE 5/9)    CAV PPx  - on ASA 81 mg daily  - on lipitor 10 mg daily    Antimicrobial PPx  - on nystatin  - continue mepron 1500 mg daily  - on valcyte 450 mg BID    Diabetes  - new onset likely 2/2 steroids/prograf  - appreciate endo assistance; c/w home DM education  - BGs improved    Renal dysfunction   - improved  - continue monitoring for now  - on sodium bicarb for hyperkalemia which has resolved    Papular lesions  - Appear to have spontaneously occurred, does not appear to be related to trauma or Lovenox injection, unclear etiology  - Check coags and factor Xa level 4 hours post next Lovenox dose  - Await culture results, would check HIV    B/L UE DVTs  - Repeat upper extremity Dopplers    Please call me at [phone] for any further questions up till 5 pm. For after hours, please call the covering cardiology [place] [phone] for any further assistance. [Shortness Of Breath] : shortness of breath [Cough] : cough [Wheezing] : wheezing [SOB on Exertion] : shortness of breath on exertion [Pain/Numbness of Digits] : pain/numbness of digits [Recent Weight Gain (___ Lbs)] : no recent weight gain [Recent Weight Loss (___ Lbs)] : no recent weight loss [Blurred Vision] : no blurred vision [Chest Pain] : no chest pain [Nausea] : no nausea [Abdominal Pain] : no abdominal pain [Polyuria] : no polyuria [Nocturia] : no nocturia [Polydipsia] : no polydipsia [FreeTextEntry6] : COPD, current bronchitis

## 2023-07-15 NOTE — PROGRESS NOTE ADULT - ATTENDING COMMENTS
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   Feeling well, denies any localizing symptoms. decreased BS at R base, +b/l LE edema L>R     - CT C/A/P with ?RLL consolidation, which was noted on prior imaging as well, ?PNA - pulm consulted, appears chronic on imaging, no role for bronch   - f/u culture data --> 1/2 Bcx +CoNS, suspect contamination- repeat cx today   - ID following, plan to c/w meropenem for 7d total and monitor off afterwards   - tacr increased to 3.5mg BID by HF team, f/u tacro level, f/u HF regarding tacro dosing   - c/w prednisone 30mg for Nicole syndrome, hematology input appreciated   -  HF following, appears near euvolemia - c/w current bumex dose   - Cr fluctuating, unclear if NORMAN vs CKD - Cr stable ~1.6 today, c/t monitor, check lytes   - d/c planning Monday 7/17 when home services resumed     #leukocytosis, SIRS  #s/p heart transplant   #Nicole syndrome  #NORMAN  #T2DM   #chronic Afib.

## 2023-07-15 NOTE — PROGRESS NOTE ADULT - PROBLEM SELECTOR PLAN 2
- appreciate transplant team recs  - tacro increased to 3 mg BID to match home dose (upon admission was started on 2 mg BID but confirmed outpatient dose is 3 mg BID), then increased to 3.5 mg BID based on subtherapeutic tacro level  -daily Tacro level (needs to be obtained ~30 min before AM dose given); goal tacro level 4-6  -transplant SW team follow up to establish plan for Safe discharge when ready - per PT, d/c home with home PT  - repeat TTE stable

## 2023-07-15 NOTE — PROGRESS NOTE ADULT - SUBJECTIVE AND OBJECTIVE BOX
INFECTIOUS DISEASES FOLLOW UP-- Sujey Lujan  [phone]    This is a follow up note for this  73yMale with  Anemia        ROS:  CONSTITUTIONAL:  No fever, good appetite  CARDIOVASCULAR:  No chest pain or palpitations  RESPIRATORY:  No dyspnea  GASTROINTESTINAL:  No nausea, vomiting, diarrhea, or abdominal pain  GENITOURINARY:  No dysuria  NEUROLOGIC:  No headache,     Allergies    No Known Allergies    Intolerances        ANTIBIOTICS/RELEVANT:  antimicrobials  nystatin    Suspension 316971 Unit(s) Oral three times a day  trimethoprim   80 mG/sulfamethoxazole 400 mG 1 Tablet(s) Oral <User Schedule>  valGANciclovir 450 milliGRAM(s) Oral <User Schedule>    immunologic:  tacrolimus 3.5 milliGRAM(s) Oral <User Schedule>    OTHER:  acetaminophen     Tablet .. 650 milliGRAM(s) Oral every 6 hours PRN  apixaban 5 milliGRAM(s) Oral every 12 hours  aspirin  chewable 81 milliGRAM(s) Oral daily  atorvastatin 10 milliGRAM(s) Oral at bedtime  buMETAnide 1 milliGRAM(s) Oral daily  dextrose 5%. 1000 milliLiter(s) IV Continuous <Continuous>  dextrose 5%. 1000 milliLiter(s) IV Continuous <Continuous>  dextrose 50% Injectable 25 Gram(s) IV Push once  dextrose 50% Injectable 12.5 Gram(s) IV Push once  dextrose 50% Injectable 25 Gram(s) IV Push once  dextrose Oral Gel 15 Gram(s) Oral once PRN  glucagon  Injectable 1 milliGRAM(s) IntraMuscular once  insulin lispro (ADMELOG) corrective regimen sliding scale   SubCutaneous at bedtime  insulin lispro (ADMELOG) corrective regimen sliding scale   SubCutaneous three times a day before meals  latanoprost 0.005% Ophthalmic Solution 1 Drop(s) Both EYES at bedtime  melatonin 3 milliGRAM(s) Oral at bedtime PRN  metoprolol succinate  milliGRAM(s) Oral daily  pantoprazole    Tablet 40 milliGRAM(s) Oral before breakfast  predniSONE   Tablet 10 milliGRAM(s) Oral three times a day      Objective:  Vital Signs Last 24 Hrs  T(C): 36.6 (15 Jul 2023 09:10), Max: 36.6 (15 Jul 2023 09:10)  T(F): 97.8 (15 Jul 2023 09:10), Max: 97.8 (15 Jul 2023 09:10)  HR: 96 (15 Jul 2023 09:10) (88 - 98)  BP: 107/72 (15 Jul 2023 09:10) (107/72 - 153/92)  BP(mean): --  RR: 18 (15 Jul 2023 09:10) (18 - 18)  SpO2: 98% (15 Jul 2023 09:10) (98% - 98%)    Parameters below as of 15 Jul 2023 09:10  Patient On (Oxygen Delivery Method): room air        PHYSICAL EXAM:  Constitutional:no acute distress  Eyes:WALT, EOMI  Ear/Nose/Throat: no oral lesions, 	  Respiratory: clear BL  Cardiovascular: S1S2  Gastrointestinal:soft, (+) BS, no tenderness  Extremities:no e/e/c  No Lymphadenopathy  IV sites not inflammed.    LABS:                        10.3   16.99 )-----------( 317      ( 15 Jul 2023 10:35 )             35.6     07-15    138  |  100  |  48<H>  ----------------------------<  180<H>  5.2   |  24  |  1.61<H>    Ca    8.8      15 Jul 2023 10:35  Phos  3.5     07-14  Mg     2.2     07-14    TPro  6.6  /  Alb  4.0  /  TBili  0.5  /  DBili  x   /  AST  16  /  ALT  10  /  AlkPhos  67  07-15      Urinalysis Basic - ( 15 Jul 2023 10:35 )    Color: x / Appearance: x / SG: x / pH: x  Gluc: 180 mg/dL / Ketone: x  / Bili: x / Urobili: x   Blood: x / Protein: x / Nitrite: x   Leuk Esterase: x / RBC: x / WBC x   Sq Epi: x / Non Sq Epi: x / Bacteria: x        MICROBIOLOGY:            RECENT CULTURES:  07-11 @ 10:32  .Blood Blood-Peripheral  Blood Culture PCR  Blood Culture PCR  PCR    Growth in aerobic bottle: Staphylococcus saprophyticus  Coagulase Negative Staphylococci isolated from a single blood culture set  may represent contamination.  Contact the Microbiology Department at [phone] if susceptibility  testing is clinically indicated.  Direct identification is available within approximately 3-5  hours either by Blood Panel Multiplexed PCR or Direct  MALDI-TOF. Details: [email or web address]  --  07-11 @ 10:29  .Blood Blood-Peripheral  --  --  --    No growth at 72 Hours  --  07-10 @ 17:19  Clean Catch Clean Catch (Midstream)  --  --  --    <10,000 CFU/mL Normal Urogenital Heaven  --  07-10 @ 15:38  .Blood Blood-Peripheral  --  --  --    No growth at 4 days  --      RADIOLOGY & ADDITIONAL STUDIES:    < from: CT Chest w/ IV Cont (07.11.23 @ 08:57) >  IMPRESSION:  *  Elevation of the right hemidiaphragm with right lower lobe   consolidation consistent with pneumonia and/or atelectasis, similar to   findings present on prior exam.  *  No evidence of acute abnormality in the abdomen and pelvis.    < end of copied text >   INFECTIOUS DISEASES FOLLOW UP-- Sujey Lujan  [phone]    This is a follow up note for this  73yMale with  Anemia        ROS:  CONSTITUTIONAL:  No fever, good appetite  CARDIOVASCULAR:  No chest pain or palpitations  RESPIRATORY:  No dyspnea  GASTROINTESTINAL:  No nausea, vomiting, diarrhea, or abdominal pain  GENITOURINARY:  No dysuria  NEUROLOGIC:  No headache,     Allergies    No Known Allergies    Intolerances        ANTIBIOTICS/RELEVANT:  antimicrobials  nystatin    Suspension [id number] Unit(s) Oral three times a day  trimethoprim   80 mG/sulfamethoxazole 400 mG 1 Tablet(s) Oral <User Schedule>  valGANciclovir 450 milliGRAM(s) Oral <User Schedule>    immunologic:  tacrolimus 3.5 milliGRAM(s) Oral <User Schedule>    OTHER:  acetaminophen     Tablet .. 650 milliGRAM(s) Oral every 6 hours PRN  apixaban 5 milliGRAM(s) Oral every 12 hours  aspirin  chewable 81 milliGRAM(s) Oral daily  atorvastatin 10 milliGRAM(s) Oral at bedtime  buMETAnide 1 milliGRAM(s) Oral daily  dextrose 5%. 1000 milliLiter(s) IV Continuous <Continuous>  dextrose 5%. 1000 milliLiter(s) IV Continuous <Continuous>  dextrose 50% Injectable 25 Gram(s) IV Push once  dextrose 50% Injectable 12.5 Gram(s) IV Push once  dextrose 50% Injectable 25 Gram(s) IV Push once  dextrose Oral Gel 15 Gram(s) Oral once PRN  glucagon  Injectable 1 milliGRAM(s) IntraMuscular once  insulin lispro (ADMELOG) corrective regimen sliding scale   SubCutaneous at bedtime  insulin lispro (ADMELOG) corrective regimen sliding scale   SubCutaneous three times a day before meals  latanoprost 0.005% Ophthalmic Solution 1 Drop(s) Both EYES at bedtime  melatonin 3 milliGRAM(s) Oral at bedtime PRN  metoprolol succinate  milliGRAM(s) Oral daily  pantoprazole    Tablet 40 milliGRAM(s) Oral before breakfast  predniSONE   Tablet 10 milliGRAM(s) Oral three times a day      Objective:  Vital Signs Last 24 Hrs  T(C): 36.6 (15 Jul 2023 09:10), Max: 36.6 (15 Jul 2023 09:10)  T(F): 97.8 (15 Jul 2023 09:10), Max: 97.8 (15 Jul 2023 09:10)  HR: 96 (15 Jul 2023 09:10) (88 - 98)  BP: 107/72 (15 Jul 2023 09:10) (107/72 - 153/92)  BP(mean): --  RR: 18 (15 Jul 2023 09:10) (18 - 18)  SpO2: 98% (15 Jul 2023 09:10) (98% - 98%)    Parameters below as of 15 Jul 2023 09:10  Patient On (Oxygen Delivery Method): room air        PHYSICAL EXAM:  Constitutional:no acute distress, cushingoid appearance  Eyes:WALT, EOMI  Ear/Nose/Throat: no oral lesions, 	  Respiratory: clear BL  Cardiovascular: S1S2  Gastrointestinal:soft, (+) BS, no tenderness  Extremities:no e/e/c  No Lymphadenopathy  IV sites not inflammed.    LABS:                        10.3   16.99 )-----------( 317      ( 15 Jul 2023 10:35 )             35.6     07-15    138  |  100  |  48<H>  ----------------------------<  180<H>  5.2   |  24  |  1.61<H>    Ca    8.8      15 Jul 2023 10:35  Phos  3.5     07-14  Mg     2.2     07-14    TPro  6.6  /  Alb  4.0  /  TBili  0.5  /  DBili  x   /  AST  16  /  ALT  10  /  AlkPhos  67  07-15      Urinalysis Basic - ( 15 Jul 2023 10:35 )    Color: x / Appearance: x / SG: x / pH: x  Gluc: 180 mg/dL / Ketone: x  / Bili: x / Urobili: x   Blood: x / Protein: x / Nitrite: x   Leuk Esterase: x / RBC: x / WBC x   Sq Epi: x / Non Sq Epi: x / Bacteria: x        MICROBIOLOGY:            RECENT CULTURES:  07-11 @ 10:32  .Blood Blood-Peripheral  Blood Culture PCR  Blood Culture PCR  PCR    Growth in aerobic bottle: Staphylococcus saprophyticus  Coagulase Negative Staphylococci isolated from a single blood culture set  may represent contamination.  Contact the Microbiology Department at [phone] if susceptibility  testing is clinically indicated.  Direct identification is available within approximately 3-5  hours either by Blood Panel Multiplexed PCR or Direct  MALDI-TOF. Details: [email or web address]  --  07-11 @ 10:29  .Blood Blood-Peripheral  --  --  --    No growth at 72 Hours  --  07-10 @ 17:19  Clean Catch Clean Catch (Midstream)  --  --  --    <10,000 CFU/mL Normal Urogenital [place]  --  07-10 @ 15:38  .Blood Blood-Peripheral  --  --  --    No growth at 4 days  --      RADIOLOGY & ADDITIONAL STUDIES:    < from: CT Chest w/ IV Cont (07.11.23 @ 08:57) >  IMPRESSION:  *  Elevation of the right hemidiaphragm with right lower lobe   consolidation consistent with pneumonia and/or atelectasis, similar to   findings present on prior exam.  *  No evidence of acute abnormality in the abdomen and pelvis.    < end of copied text >

## 2023-07-15 NOTE — PROGRESS NOTE ADULT - ASSESSMENT
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 His course was complicated by bilateral IJ/subclavian thrombi, a persistent small right sided pleural effusion, as well as acute on chronic renal failure of unclear etiology. Who presents to ED from HF clinic for ?infection on IV ABT as an outpt. Requiring evaluation given transplant Hx with labs.  Unclear etiology as patient appears well but does not have knowledge of any infections. Leukocytosis present on admission and persists despite a course of empiric meropenem    Would:  remain off antibiotics at this point  Send repeat blood cultures as 1/2 sets grew Staph saprophyticus  trend WBC with diff    Gary Lujan MD  Can be called via Teams  After 5pm/weekends [phone]

## 2023-07-15 NOTE — PROGRESS NOTE ADULT - ASSESSMENT
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

## 2023-07-15 NOTE — PROGRESS NOTE ADULT - PROBLEM SELECTOR PLAN 3
Follows with Dr. Rosario. As an outpatient, discussed possibly slow wean prednisone.   - hematology following, appreciate recs  - will trend hemolysis markers daily: LDH, reticulocyte count, CMP, phos, fractionated direct and indirect bilirubin, haptoglobin  - Hb stable this admission at ~9  - c/w Prednisone 30mg QD  - continue protonix for GI protection on prednisone  - continue bactrim for PJP ppx - increased dose to bactrim DS TID (dose appears to have been reduced on prior admission for renal function, will monitor for hyperkalemia at increased dose), re-decreased back to SS TID by transplant team

## 2023-07-15 NOTE — PROGRESS NOTE ADULT - PROBLEM SELECTOR PLAN 4
SCr 1.17 -> 1.11 -> 1.59. On previous admission, SCr as high as 3.71, decreased to 1.74 by d/c.   - unclear baseline, although appears to be developing some component of NORMAN  - trend SCr  - avoid nephrotoxic agents  - trend BMPs

## 2023-07-15 NOTE — PROGRESS NOTE ADULT - SUBJECTIVE AND OBJECTIVE BOX
BILLY RUSSELL  73y  Male    Patient is a 73y old  Male who presents with a chief complaint of sepsis w/u (14 Jul 2023 08:54)      INTERVAL HPI/OVERNIGHT EVENTS:      T(C): 36.4 (07-15-23 @ 05:28), Max: 36.4 (07-14-23 @ 17:09)  HR: 98 (07-15-23 @ 05:28) (88 - 101)  BP: 153/92 (07-15-23 @ 05:28) (127/88 - 153/92)  RR: 18 (07-15-23 @ 05:28) (18 - 18)  SpO2: 98% (07-15-23 @ 05:28) (98% - 99%)  Wt(kg): --Vital Signs Last 24 Hrs  T(C): 36.4 (15 Jul 2023 05:28), Max: 36.4 (14 Jul 2023 17:09)  T(F): 97.5 (15 Jul 2023 05:28), Max: 97.6 (15 Jul 2023 00:27)  HR: 98 (15 Jul 2023 05:28) (88 - 101)  BP: 153/92 (15 Jul 2023 05:28) (127/88 - 153/92)  BP(mean): --  RR: 18 (15 Jul 2023 05:28) (18 - 18)  SpO2: 98% (15 Jul 2023 05:28) (98% - 99%)    Parameters below as of 15 Jul 2023 05:28  Patient On (Oxygen Delivery Method): room air        PHYSICAL EXAM:  GENERAL: NAD, well-groomed, well-developed  HEAD:  Atraumatic, Normocephalic  EYES: EOMI, PERRLA, conjunctiva and sclera clear  ENMT: No tonsillar erythema, exudates, or enlargement; Moist mucous membranes, Good dentition, No lesions  NECK: Supple, No JVD, Normal thyroid  NERVOUS SYSTEM:  Alert & Oriented X3, Good concentration; Motor Strength 5/5 B/L upper and lower extremities; DTRs 2+ intact and symmetric  CHEST/LUNG: Clear to auscultation bilaterally; No rales, rhonchi, wheezing, or rubs  HEART: Regular rate and rhythm; No murmurs, rubs, or gallops  ABDOMEN: Soft, Nontender, Nondistended; Bowel sounds present  EXTREMITIES:  WWP, No clubbing, cyanosis, or edema  Vascular: 2+ peripheral pulses x4  LYMPH: No lymphadenopathy noted  SKIN: No rashes or lesions    Consultant(s) Notes Reviewed:  [x ] YES  [ ] NO  Care Discussed with Consultants/Other Providers [ x] YES  [ ] NO    LABS:                        10.0   13.71 )-----------( 295      ( 14 Jul 2023 07:04 )             33.9     07-14    141  |  103  |  43<H>  ----------------------------<  140<H>  5.0   |  23  |  1.53<H>    Ca    9.3      14 Jul 2023 07:03  Phos  3.5     07-14  Mg     2.2     07-14    TPro  6.4  /  Alb  4.0  /  TBili  0.4  /  DBili  x   /  AST  14  /  ALT  8<L>  /  AlkPhos  61  07-14        Urinalysis Basic - ( 14 Jul 2023 07:03 )    Color: x / Appearance: x / SG: x / pH: x  Gluc: 140 mg/dL / Ketone: x  / Bili: x / Urobili: x   Blood: x / Protein: x / Nitrite: x   Leuk Esterase: x / RBC: x / WBC x   Sq Epi: x / Non Sq Epi: x / Bacteria: x      CAPILLARY BLOOD GLUCOSE      POCT Blood Glucose.: 142 mg/dL (14 Jul 2023 21:16)  POCT Blood Glucose.: 147 mg/dL (14 Jul 2023 16:38)  POCT Blood Glucose.: 159 mg/dL (14 Jul 2023 11:57)  POCT Blood Glucose.: 141 mg/dL (14 Jul 2023 07:27)        Urinalysis Basic - ( 14 Jul 2023 07:03 )    Color: x / Appearance: x / SG: x / pH: x  Gluc: 140 mg/dL / Ketone: x  / Bili: x / Urobili: x   Blood: x / Protein: x / Nitrite: x   Leuk Esterase: x / RBC: x / WBC x   Sq Epi: x / Non Sq Epi: x / Bacteria: x        RADIOLOGY & ADDITIONAL TESTS:    Imaging Personally Reviewed:  [ ] YES  [ ] NO    HEALTH ISSUES - PROBLEM Dx:  Transplanted heart    Nicole syndrome    Receiving intravenous antibiotic treatment at home    Chronic atrial fibrillation    Hypertension    Chronic kidney disease (CKD)    Encounter for deep vein thrombosis (DVT) prophylaxis    Type 2 diabetes mellitus    Systemic inflammatory response syndrome (SIRS)    Anemia         BILLY RUSSELL  73y  Male    Patient is a 73y old  Male who presents with a chief complaint of sepsis w/u (14 Jul 2023 08:54)    INTERVAL HPI/OVERNIGHT EVENTS:  - feeling well this AM, no concerns. Denies fevers, chills, chest pain, cough, dyspnea, abdominal pain, n/v/d  - per RN, no recent agitation/behavioral disturbances    T(C): 36.4 (07-15-23 @ 05:28), Max: 36.4 (07-14-23 @ 17:09)  HR: 98 (07-15-23 @ 05:28) (88 - 101)  BP: 153/92 (07-15-23 @ 05:28) (127/88 - 153/92)  RR: 18 (07-15-23 @ 05:28) (18 - 18)  SpO2: 98% (07-15-23 @ 05:28) (98% - 99%)  Wt(kg): --Vital Signs Last 24 Hrs  T(C): 36.4 (15 Jul 2023 05:28), Max: 36.4 (14 Jul 2023 17:09)  T(F): 97.5 (15 Jul 2023 05:28), Max: 97.6 (15 Jul 2023 00:27)  HR: 98 (15 Jul 2023 05:28) (88 - 101)  BP: 153/92 (15 Jul 2023 05:28) (127/88 - 153/92)  BP(mean): --  RR: 18 (15 Jul 2023 05:28) (18 - 18)  SpO2: 98% (15 Jul 2023 05:28) (98% - 99%)    Parameters below as of 15 Jul 2023 05:28  Patient On (Oxygen Delivery Method): room air    PHYSICAL EXAM:  GENERAL: NAD, non-toxic appearing, sitting upright in bed  HEAD:  Atraumatic, Normocephalic  EYES: EOMI, PERRLA, conjunctiva and sclera clear, no jaundice   ENMT: Moist mucous membranes  HEART: Regular rate and rhythm; normal S1/S2  RESPIRATORY: CTA B/L, No W/R/R  ABDOMEN: Soft, Nontender, Nondistended.  NEUROLOGY: A&O x4  EXTREMITIES:  2+ Peripheral Pulses, No clubbing or cyanosis. Mild LE pitting edema, improved  SKIN: no rashes or lesions    Consultant(s) Notes Reviewed:  [x ] YES  [ ] NO  Care Discussed with Consultants/Other Providers [ x] YES  [ ] NO    LABS:                        10.0   13.71 )-----------( 295      ( 14 Jul 2023 07:04 )             33.9     07-14    141  |  103  |  43<H>  ----------------------------<  140<H>  5.0   |  23  |  1.53<H>    Ca    9.3      14 Jul 2023 07:03  Phos  3.5     07-14  Mg     2.2     07-14    TPro  6.4  /  Alb  4.0  /  TBili  0.4  /  DBili  x   /  AST  14  /  ALT  8<L>  /  AlkPhos  61  07-14        Urinalysis Basic - ( 14 Jul 2023 07:03 )    Color: x / Appearance: x / SG: x / pH: x  Gluc: 140 mg/dL / Ketone: x  / Bili: x / Urobili: x   Blood: x / Protein: x / Nitrite: x   Leuk Esterase: x / RBC: x / WBC x   Sq Epi: x / Non Sq Epi: x / Bacteria: x      CAPILLARY BLOOD GLUCOSE      POCT Blood Glucose.: 142 mg/dL (14 Jul 2023 21:16)  POCT Blood Glucose.: 147 mg/dL (14 Jul 2023 16:38)  POCT Blood Glucose.: 159 mg/dL (14 Jul 2023 11:57)  POCT Blood Glucose.: 141 mg/dL (14 Jul 2023 07:27)        Urinalysis Basic - ( 14 Jul 2023 07:03 )    Color: x / Appearance: x / SG: x / pH: x  Gluc: 140 mg/dL / Ketone: x  / Bili: x / Urobili: x   Blood: x / Protein: x / Nitrite: x   Leuk Esterase: x / RBC: x / WBC x   Sq Epi: x / Non Sq Epi: x / Bacteria: x        RADIOLOGY & ADDITIONAL TESTS:    Imaging Personally Reviewed:  [ ] YES  [ ] NO    HEALTH ISSUES - PROBLEM Dx:  Transplanted heart    Nicole syndrome    Receiving intravenous antibiotic treatment at home    Chronic atrial fibrillation    Hypertension    Chronic kidney disease (CKD)    Encounter for deep vein thrombosis (DVT) prophylaxis    Type 2 diabetes mellitus    Systemic inflammatory response syndrome (SIRS)    Anemia

## 2023-07-15 NOTE — PROGRESS NOTE ADULT - PROBLEM SELECTOR PLAN 8
Diet: DASH with CC   Dispo: Pending   DVT: On Eliquis  Code: FULL Diet: DASH with CC   Dispo: Likely Monday 7/17 when home services resume  DVT: On Eliquis  Code: FULL

## 2023-07-15 NOTE — PROGRESS NOTE ADULT - PROBLEM SELECTOR PLAN 1
Pt w/ leukocytosis and tachycardia on admission, started on IV meropenem on 7/8 at rehab for elevated WBC. No clear source of infx identified. CXR with R basilar atelectasis, CT chest & abdomen with RLL consolidation and R hemidiaphragm elevation, although this seems to be chronic when comparing to prior films. CXR showing R basilar atelectasis. UA unremarkable. RVP negative. Procal 0.11. WBC now downtrending, stable vitals throughout admission, no source of infection identified.  - ID following, appreciate recs  - trend WBC  - c/w meropenem x 1 week total course - last dose 10 pm today  - will monitor off abx through weekend per ID  - f/u quantiferon (pt with PPD in forearm from JP)  - f/u ESR, CRP  - f/u legionella urine ag, serum fungitell, galactomannan  - CT chest with RLL consolidation, appears to be chronic when compared with prior scans. Pulmonology consulted, not offering bronchoscopy/BAL due to chronicity of atelectasis, unlikely active infection  - 1/2 blood culture sets growing coagulase negative staph, likely contamination but will f/u ID recs Pt w/ leukocytosis and tachycardia on admission, started on IV meropenem on 7/8 at rehab for elevated WBC. No clear source of infx identified. CXR with R basilar atelectasis, CT chest & abdomen with RLL consolidation and R hemidiaphragm elevation, although this seems to be chronic when comparing to prior films. CXR showing R basilar atelectasis. UA unremarkable. RVP negative. Procal 0.11. WBC now downtrending, stable vitals throughout admission, no source of infection identified. Now s/p meropenem course, last dose 7/14. WBC persistently elevated throughout admission.    - ID following, appreciate recs  - will continue to monitor off abx through Monday  - trend WBC  - f/u quantiferon (pt with PPD in forearm from JP)  - f/u ESR, CRP  - f/u legionella urine ag,   - serum fungitell, galactomannan negative  - CT chest with RLL consolidation, appears to be chronic when compared with prior scans. Pulmonology consulted, not offering bronchoscopy/BAL due to chronicity of atelectasis, unlikely active infection  - 1/2 blood culture sets growing coagulase negative staph, likely contamination. Repeat blood cultures drawn 7/15, will f/u results Pt w/ leukocytosis and tachycardia on admission, started on IV meropenem on 7/8 at rehab for elevated WBC. No clear source of infx identified. CXR with R basilar atelectasis, CT chest & abdomen with RLL consolidation and R hemidiaphragm elevation, although this seems to be chronic when comparing to prior films. CXR showing R basilar atelectasis. UA unremarkable. RVP negative. Procal 0.11. WBC now downtrending, stable vitals throughout admission, no source of infection identified. Now s/p meropenem course, last dose 7/14. WBC persistently elevated throughout admission.    - ID following, appreciate recs  - will continue to monitor off abx through Monday  - trend WBC  - f/u quantiferon (pt with PPD in forearm from JP)  - f/u ESR, CRP  - f/u legionella urine ag,   - serum fungitell, galactomannan negative  - CT chest with RLL consolidation, appears to be chronic when compared with prior scans. Pulmonology consulted, not offering bronchoscopy/BAL due to chronicity of atelectasis, unlikely active infection  - 1/2 blood culture sets growing coagulase negative staph, likely contamination. Repeat blood cultures ordered 7/15, however unable to draw labs and will need to wait for phlebotomy tomorrow AM

## 2023-07-16 DIAGNOSIS — E87.5 HYPERKALEMIA: ICD-10-CM

## 2023-07-16 LAB
ALBUMIN SERPL ELPH-MCNC: 4 G/DL — SIGNIFICANT CHANGE UP (ref 3.3–5)
ALP SERPL-CCNC: 69 U/L — SIGNIFICANT CHANGE UP (ref 40–120)
ALT FLD-CCNC: 12 U/L — SIGNIFICANT CHANGE UP (ref 10–45)
ANION GAP SERPL CALC-SCNC: 11 MMOL/L — SIGNIFICANT CHANGE UP (ref 5–17)
ANION GAP SERPL CALC-SCNC: 17 MMOL/L — SIGNIFICANT CHANGE UP (ref 5–17)
AST SERPL-CCNC: 21 U/L — SIGNIFICANT CHANGE UP (ref 10–40)
BILIRUB SERPL-MCNC: 0.5 MG/DL — SIGNIFICANT CHANGE UP (ref 0.2–1.2)
BUN SERPL-MCNC: 43 MG/DL — HIGH (ref 7–23)
BUN SERPL-MCNC: 47 MG/DL — HIGH (ref 7–23)
CALCIUM SERPL-MCNC: 9 MG/DL — SIGNIFICANT CHANGE UP (ref 8.4–10.5)
CALCIUM SERPL-MCNC: 9.4 MG/DL — SIGNIFICANT CHANGE UP (ref 8.4–10.5)
CHLORIDE SERPL-SCNC: 100 MMOL/L — SIGNIFICANT CHANGE UP (ref 96–108)
CHLORIDE SERPL-SCNC: 101 MMOL/L — SIGNIFICANT CHANGE UP (ref 96–108)
CO2 SERPL-SCNC: 22 MMOL/L — SIGNIFICANT CHANGE UP (ref 22–31)
CO2 SERPL-SCNC: 25 MMOL/L — SIGNIFICANT CHANGE UP (ref 22–31)
CREAT SERPL-MCNC: 1.38 MG/DL — HIGH (ref 0.5–1.3)
CREAT SERPL-MCNC: 1.45 MG/DL — HIGH (ref 0.5–1.3)
CULTURE RESULTS: SIGNIFICANT CHANGE UP
EGFR: 51 ML/MIN/1.73M2 — LOW
EGFR: 54 ML/MIN/1.73M2 — LOW
GLUCOSE BLDC GLUCOMTR-MCNC: 169 MG/DL — HIGH (ref 70–99)
GLUCOSE BLDC GLUCOMTR-MCNC: 179 MG/DL — HIGH (ref 70–99)
GLUCOSE BLDC GLUCOMTR-MCNC: 188 MG/DL — HIGH (ref 70–99)
GLUCOSE BLDC GLUCOMTR-MCNC: 273 MG/DL — HIGH (ref 70–99)
GLUCOSE SERPL-MCNC: 191 MG/DL — HIGH (ref 70–99)
GLUCOSE SERPL-MCNC: 216 MG/DL — HIGH (ref 70–99)
HAPTOGLOB SERPL-MCNC: 200 MG/DL — SIGNIFICANT CHANGE UP (ref 34–200)
HCT VFR BLD CALC: 35.1 % — LOW (ref 39–50)
HGB BLD-MCNC: 10.3 G/DL — LOW (ref 13–17)
LDH SERPL L TO P-CCNC: 358 U/L — HIGH (ref 50–242)
MAGNESIUM SERPL-MCNC: 2 MG/DL — SIGNIFICANT CHANGE UP (ref 1.6–2.6)
MAGNESIUM SERPL-MCNC: 2.1 MG/DL — SIGNIFICANT CHANGE UP (ref 1.6–2.6)
MCHC RBC-ENTMCNC: 28.9 PG — SIGNIFICANT CHANGE UP (ref 27–34)
MCHC RBC-ENTMCNC: 29.3 GM/DL — LOW (ref 32–36)
MCV RBC AUTO: 98.6 FL — SIGNIFICANT CHANGE UP (ref 80–100)
NRBC # BLD: 0 /100 WBCS — SIGNIFICANT CHANGE UP (ref 0–0)
PHOSPHATE SERPL-MCNC: 3 MG/DL — SIGNIFICANT CHANGE UP (ref 2.5–4.5)
PHOSPHATE SERPL-MCNC: 3.3 MG/DL — SIGNIFICANT CHANGE UP (ref 2.5–4.5)
PLATELET # BLD AUTO: 279 K/UL — SIGNIFICANT CHANGE UP (ref 150–400)
POTASSIUM SERPL-MCNC: 4.9 MMOL/L — SIGNIFICANT CHANGE UP (ref 3.5–5.3)
POTASSIUM SERPL-MCNC: 6 MMOL/L — HIGH (ref 3.5–5.3)
POTASSIUM SERPL-SCNC: 4.9 MMOL/L — SIGNIFICANT CHANGE UP (ref 3.5–5.3)
POTASSIUM SERPL-SCNC: 6 MMOL/L — HIGH (ref 3.5–5.3)
PROT SERPL-MCNC: 6.5 G/DL — SIGNIFICANT CHANGE UP (ref 6–8.3)
RBC # BLD: 3.56 M/UL — LOW (ref 4.2–5.8)
RBC # FLD: 19.2 % — HIGH (ref 10.3–14.5)
SODIUM SERPL-SCNC: 137 MMOL/L — SIGNIFICANT CHANGE UP (ref 135–145)
SODIUM SERPL-SCNC: 139 MMOL/L — SIGNIFICANT CHANGE UP (ref 135–145)
SPECIMEN SOURCE: SIGNIFICANT CHANGE UP
TACROLIMUS SERPL-MCNC: 5.9 NG/ML — SIGNIFICANT CHANGE UP
WBC # BLD: 16.27 K/UL — HIGH (ref 3.8–10.5)
WBC # FLD AUTO: 16.27 K/UL — HIGH (ref 3.8–10.5)

## 2023-07-16 PROCEDURE — 99232 SBSQ HOSP IP/OBS MODERATE 35: CPT | Mod: GC

## 2023-07-16 RX ORDER — SODIUM BICARBONATE 1 MEQ/ML
50 SYRINGE (ML) INTRAVENOUS ONCE
Refills: 0 | Status: COMPLETED | OUTPATIENT
Start: 2023-07-16 | End: 2023-07-16

## 2023-07-16 RX ORDER — SODIUM ZIRCONIUM CYCLOSILICATE 10 G/10G
10 POWDER, FOR SUSPENSION ORAL ONCE
Refills: 0 | Status: COMPLETED | OUTPATIENT
Start: 2023-07-16 | End: 2023-07-16

## 2023-07-16 RX ORDER — SODIUM ZIRCONIUM CYCLOSILICATE 10 G/10G
10 POWDER, FOR SUSPENSION ORAL
Refills: 0 | Status: DISCONTINUED | OUTPATIENT
Start: 2023-07-17 | End: 2023-07-18

## 2023-07-16 RX ORDER — INSULIN HUMAN 100 [IU]/ML
5 INJECTION, SOLUTION SUBCUTANEOUS ONCE
Refills: 0 | Status: COMPLETED | OUTPATIENT
Start: 2023-07-16 | End: 2023-07-16

## 2023-07-16 RX ORDER — CALCIUM GLUCONATE 100 MG/ML
1 VIAL (ML) INTRAVENOUS ONCE
Refills: 0 | Status: COMPLETED | OUTPATIENT
Start: 2023-07-16 | End: 2023-07-16

## 2023-07-16 RX ORDER — ALBUTEROL 90 UG/1
10 AEROSOL, METERED ORAL ONCE
Refills: 0 | Status: COMPLETED | OUTPATIENT
Start: 2023-07-16 | End: 2023-07-16

## 2023-07-16 RX ORDER — DEXTROSE 50 % IN WATER 50 %
50 SYRINGE (ML) INTRAVENOUS ONCE
Refills: 0 | Status: COMPLETED | OUTPATIENT
Start: 2023-07-16 | End: 2023-07-16

## 2023-07-16 RX ADMIN — TACROLIMUS 3.5 MILLIGRAM(S): 5 CAPSULE ORAL at 11:45

## 2023-07-16 RX ADMIN — ATORVASTATIN CALCIUM 10 MILLIGRAM(S): 80 TABLET, FILM COATED ORAL at 20:49

## 2023-07-16 RX ADMIN — APIXABAN 5 MILLIGRAM(S): 2.5 TABLET, FILM COATED ORAL at 17:01

## 2023-07-16 RX ADMIN — Medication 100 GRAM(S): at 13:56

## 2023-07-16 RX ADMIN — Medication 500000 UNIT(S): at 14:06

## 2023-07-16 RX ADMIN — BUMETANIDE 1 MILLIGRAM(S): 0.25 INJECTION INTRAMUSCULAR; INTRAVENOUS at 04:59

## 2023-07-16 RX ADMIN — SODIUM ZIRCONIUM CYCLOSILICATE 10 GRAM(S): 10 POWDER, FOR SUSPENSION ORAL at 11:50

## 2023-07-16 RX ADMIN — Medication 1: at 21:09

## 2023-07-16 RX ADMIN — Medication 10 MILLIGRAM(S): at 04:59

## 2023-07-16 RX ADMIN — Medication 10 MILLIGRAM(S): at 20:49

## 2023-07-16 RX ADMIN — Medication 10 MILLIGRAM(S): at 13:57

## 2023-07-16 RX ADMIN — PANTOPRAZOLE SODIUM 40 MILLIGRAM(S): 20 TABLET, DELAYED RELEASE ORAL at 04:59

## 2023-07-16 RX ADMIN — LATANOPROST 1 DROP(S): 0.05 SOLUTION/ DROPS OPHTHALMIC; TOPICAL at 20:50

## 2023-07-16 RX ADMIN — Medication 1: at 12:09

## 2023-07-16 RX ADMIN — TACROLIMUS 3.5 MILLIGRAM(S): 5 CAPSULE ORAL at 19:57

## 2023-07-16 RX ADMIN — Medication 1: at 08:13

## 2023-07-16 RX ADMIN — Medication 100 MILLIGRAM(S): at 04:58

## 2023-07-16 RX ADMIN — Medication 1: at 17:01

## 2023-07-16 RX ADMIN — Medication 50 MILLIEQUIVALENT(S): at 13:59

## 2023-07-16 RX ADMIN — Medication 500000 UNIT(S): at 20:50

## 2023-07-16 RX ADMIN — ALBUTEROL 10 MILLIGRAM(S): 90 AEROSOL, METERED ORAL at 13:57

## 2023-07-16 RX ADMIN — APIXABAN 5 MILLIGRAM(S): 2.5 TABLET, FILM COATED ORAL at 04:59

## 2023-07-16 RX ADMIN — Medication 500000 UNIT(S): at 04:59

## 2023-07-16 RX ADMIN — Medication 81 MILLIGRAM(S): at 11:46

## 2023-07-16 NOTE — PROGRESS NOTE ADULT - PROBLEM SELECTOR PLAN 1
Pt w/ leukocytosis and tachycardia on admission, started on IV meropenem on 7/8 at rehab for elevated WBC. No clear source of infx identified. CXR with R basilar atelectasis, CT chest & abdomen with RLL consolidation and R hemidiaphragm elevation, although this seems to be chronic when comparing to prior films. CXR showing R basilar atelectasis. UA unremarkable. RVP negative. Procal 0.11. WBC now downtrending, stable vitals throughout admission, no source of infection identified. Now s/p meropenem course, last dose 7/14. WBC persistently elevated throughout admission.    - ID following, appreciate recs  - will continue to monitor off abx through Monday  - trend WBC  - f/u quantiferon (pt with PPD in forearm from [facility])  - f/u ESR, CRP  - f/u legionella urine ag,   - serum fungitell, galactomannan negative  - CT chest with RLL consolidation, appears to be chronic when compared with prior scans. Pulmonology consulted, not offering bronchoscopy/BAL due to chronicity of atelectasis, unlikely active infection  - 1/2 blood culture sets growing coagulase negative staph, likely contamination. Repeat blood cultures ordered 7/15, however unable to draw labs and will need to wait for phlebotomy tomorrow AM Pt w/ leukocytosis and tachycardia on admission, started on IV meropenem on 7/8 at rehab for elevated WBC. No clear source of infx identified. CXR with R basilar atelectasis, CT chest & abdomen with RLL consolidation and R hemidiaphragm elevation, although this seems to be chronic when comparing to prior films. CXR showing R basilar atelectasis. UA unremarkable. RVP negative. Procal 0.11. WBC now downtrending, stable vitals throughout admission, no source of infection identified. Now s/p meropenem course, last dose 7/14. WBC persistently elevated throughout admission.    - ID following, appreciate recs  - will continue to monitor off abx through Monday  - trend WBC  - f/u quantiferon (pt with PPD in forearm from [facility])  - f/u ESR, CRP  - legionella urine ag negative  - serum fungitell, galactomannan negative  - CT chest with RLL consolidation, appears to be chronic when compared with prior scans. Pulmonology consulted, not offering bronchoscopy/BAL due to chronicity of atelectasis, unlikely active infection  - 1/2 blood culture sets growing coagulase negative staph, likely contamination. Repeat blood cultures drawn 7/16, pending

## 2023-07-16 NOTE — PROGRESS NOTE ADULT - PROBLEM SELECTOR PLAN 8
Diet: DASH with CC   Dispo: Likely Monday 7/17 when home services resume  DVT: On Eliquis  Code: FULL Intermittently hyperkalemic this admission. K this AM 6.0. Likely contributions from CKD, chronic hemolysis, tacrolimus.   - ordered lokelma, insulin, dextrose, EKG. Will f/u EKG and repeat BMP  - lokelma 10 mg PO QD (patient reportedly on veltassa QD at home)  - reportedly tacrolimus can contribute to hyperkalemia - discussed with clinical pharmacist whether hyperkalemia warrants tacrolimus dose increase, per pharmD will continue at current dose Intermittently hyperkalemic this admission. K this AM 6.0. Likely contributions from CKD, chronic hemolysis, tacrolimus.   - ordered lokelma, insulin, dextrose, calcium gluconate, EKG. Will f/u EKG and repeat BMP at 1600  - lokelma 10 mg PO QD (patient reportedly on veltassa QD at home)  - reportedly tacrolimus can contribute to hyperkalemia - discussed with clinical pharmacist whether hyperkalemia warrants tacrolimus dose increase, per pharmD will continue at current dose

## 2023-07-16 NOTE — PROGRESS NOTE ADULT - PROBLEM SELECTOR PLAN 5
Pt previously on admelog 7u tid and glargine 8u at bedtime, however outpatient med rec reveals pt not recently on any insulin  - not on any standing insulin this admission, CBGs at goal  - endocrine consulted for optimization of outpatient insulin regimen

## 2023-07-16 NOTE — PROGRESS NOTE ADULT - PROBLEM SELECTOR PLAN 4
SCr 1.17 -> 1.11 -> 1.59. On previous admission, SCr as high as 3.71, decreased to 1.74 by d/c.   - unclear baseline, although appears to be developing some component of NORMAN  - trend SCr  - avoid nephrotoxic agents  - trend BMPs SCr 1.17 -> 1.11 -> 1.59 -> 1.38. On previous admission, SCr as high as 3.71, decreased to 1.74 by d/c.   - unclear baseline, although appears to be developing some component of NORMAN  - trend SCr  - avoid nephrotoxic agents  - trend BMPs

## 2023-07-16 NOTE — PROGRESS NOTE ADULT - PROBLEM SELECTOR PLAN 9
Diet: DASH with CC   Dispo: Likely Monday 7/17 when home services resume  DVT: On Eliquis  Code: FULL

## 2023-07-16 NOTE — PROGRESS NOTE ADULT - PROBLEM SELECTOR PLAN 2
- appreciate transplant team recs  - tacro increased to 3 mg BID to match home dose (upon admission was started on 2 mg BID but confirmed outpatient dose is 3 mg BID), then increased to 3.5 mg BID based on subtherapeutic tacro level  -daily Tacro level (needs to be obtained ~30 min before AM dose given); goal tacro level 4-6  -transplant SW team follow up to establish plan for Safe discharge when ready - per PT, d/c home with home PT  - repeat TTE stable - appreciate transplant team recs  - tacro increased to 3 mg BID to match home dose (upon admission was started on 2 mg BID but confirmed outpatient dose is 3 mg BID), then increased to 3.5 mg BID based on subtherapeutic tacro level. Continuing 3.5 mg BID dosing per cardiology recs  -daily Tacro level (needs to be obtained ~30 min before AM dose given); goal tacro level 4-6  -transplant SW team follow up to establish plan for Safe discharge when ready - per PT, d/c home with home PT  - repeat TTE stable

## 2023-07-16 NOTE — PROGRESS NOTE ADULT - ASSESSMENT
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

## 2023-07-16 NOTE — PROGRESS NOTE ADULT - SUBJECTIVE AND OBJECTIVE BOX
BILLY RUSSELL  73y  Male    Patient is a 73y old  Male who presents with a chief complaint of sepsis w/u (15 Jul 2023 12:15)      INTERVAL HPI/OVERNIGHT EVENTS:      T(C): 36.4 (07-16-23 @ 00:01), Max: 36.6 (07-15-23 @ 09:10)  HR: 99 (07-16-23 @ 04:56) (96 - 99)  BP: 136/89 (07-16-23 @ 04:56) (107/72 - 136/89)  RR: 18 (07-16-23 @ 00:01) (18 - 18)  SpO2: 98% (07-16-23 @ 00:01) (96% - 98%)  Wt(kg): --Vital Signs Last 24 Hrs  T(C): 36.4 (16 Jul 2023 00:01), Max: 36.6 (15 Jul 2023 09:10)  T(F): 97.5 (16 Jul 2023 00:01), Max: 97.9 (15 Jul 2023 15:57)  HR: 99 (16 Jul 2023 04:56) (96 - 99)  BP: 136/89 (16 Jul 2023 04:56) (107/72 - 136/89)  BP(mean): --  RR: 18 (16 Jul 2023 00:01) (18 - 18)  SpO2: 98% (16 Jul 2023 00:01) (96% - 98%)    Parameters below as of 16 Jul 2023 00:01  Patient On (Oxygen Delivery Method): room air        PHYSICAL EXAM:  GENERAL: NAD, well-groomed, well-developed  HEAD:  Atraumatic, Normocephalic  EYES: EOMI, PERRLA, conjunctiva and sclera clear  ENMT: No tonsillar erythema, exudates, or enlargement; Moist mucous membranes, Good dentition, No lesions  NECK: Supple, No JVD, Normal thyroid  NERVOUS SYSTEM:  Alert & Oriented X3, Good concentration; Motor Strength 5/5 B/L upper and lower extremities; DTRs 2+ intact and symmetric  CHEST/LUNG: Clear to auscultation bilaterally; No rales, rhonchi, wheezing, or rubs  HEART: Regular rate and rhythm; No murmurs, rubs, or gallops  ABDOMEN: Soft, Nontender, Nondistended; Bowel sounds present  EXTREMITIES:  WWP, No clubbing, cyanosis, or edema  Vascular: 2+ peripheral pulses x4  LYMPH: No lymphadenopathy noted  SKIN: No rashes or lesions    Consultant(s) Notes Reviewed:  [x ] YES  [ ] NO  Care Discussed with Consultants/Other Providers [ x] YES  [ ] NO    LABS:                        10.3   16.99 )-----------( 317      ( 15 Jul 2023 10:35 )             35.6     07-15    138  |  100  |  48<H>  ----------------------------<  180<H>  5.2   |  24  |  1.61<H>    Ca    8.8      15 Jul 2023 10:35    TPro  6.6  /  Alb  4.0  /  TBili  0.5  /  DBili  x   /  AST  16  /  ALT  10  /  AlkPhos  67  07-15        Urinalysis Basic - ( 15 Jul 2023 10:35 )    Color: x / Appearance: x / SG: x / pH: x  Gluc: 180 mg/dL / Ketone: x  / Bili: x / Urobili: x   Blood: x / Protein: x / Nitrite: x   Leuk Esterase: x / RBC: x / WBC x   Sq Epi: x / Non Sq Epi: x / Bacteria: x      CAPILLARY BLOOD GLUCOSE      POCT Blood Glucose.: 225 mg/dL (15 Jul 2023 21:07)  POCT Blood Glucose.: 127 mg/dL (15 Jul 2023 16:39)  POCT Blood Glucose.: 187 mg/dL (15 Jul 2023 11:52)  POCT Blood Glucose.: 175 mg/dL (15 Jul 2023 07:37)        Urinalysis Basic - ( 15 Jul 2023 10:35 )    Color: x / Appearance: x / SG: x / pH: x  Gluc: 180 mg/dL / Ketone: x  / Bili: x / Urobili: x   Blood: x / Protein: x / Nitrite: x   Leuk Esterase: x / RBC: x / WBC x   Sq Epi: x / Non Sq Epi: x / Bacteria: x        RADIOLOGY & ADDITIONAL TESTS:    Imaging Personally Reviewed:  [ ] YES  [ ] NO    HEALTH ISSUES - PROBLEM Dx:  Transplanted heart    Nicole syndrome    Receiving intravenous antibiotic treatment at home    Chronic atrial fibrillation    Hypertension    Chronic kidney disease (CKD)    Encounter for deep vein thrombosis (DVT) prophylaxis    Type 2 diabetes mellitus    Systemic inflammatory response syndrome (SIRS)    Anemia         BILLY RUSSELL  73y  Male    Patient is a 73y old  Male who presents with a chief complaint of sepsis w/u (15 Jul 2023 12:15)    INTERVAL HPI/OVERNIGHT EVENTS:  -     T(C): 36.4 (07-16-23 @ 00:01), Max: 36.6 (07-15-23 @ 09:10)  HR: 99 (07-16-23 @ 04:56) (96 - 99)  BP: 136/89 (07-16-23 @ 04:56) (107/72 - 136/89)  RR: 18 (07-16-23 @ 00:01) (18 - 18)  SpO2: 98% (07-16-23 @ 00:01) (96% - 98%)  Wt(kg): --Vital Signs Last 24 Hrs  T(C): 36.4 (16 Jul 2023 00:01), Max: 36.6 (15 Jul 2023 09:10)  T(F): 97.5 (16 Jul 2023 00:01), Max: 97.9 (15 Jul 2023 15:57)  HR: 99 (16 Jul 2023 04:56) (96 - 99)  BP: 136/89 (16 Jul 2023 04:56) (107/72 - 136/89)  BP(mean): --  RR: 18 (16 Jul 2023 00:01) (18 - 18)  SpO2: 98% (16 Jul 2023 00:01) (96% - 98%)    Parameters below as of 16 Jul 2023 00:01  Patient On (Oxygen Delivery Method): room air        PHYSICAL EXAM:  GENERAL: NAD, well-groomed, well-developed  HEAD:  Atraumatic, Normocephalic  EYES: EOMI, PERRLA, conjunctiva and sclera clear  ENMT: No tonsillar erythema, exudates, or enlargement; Moist mucous membranes, Good dentition, No lesions  NECK: Supple, No JVD, Normal thyroid  NERVOUS SYSTEM:  Alert & Oriented X3, Good concentration; Motor Strength 5/5 B/L upper and lower extremities; DTRs 2+ intact and symmetric  CHEST/LUNG: Clear to auscultation bilaterally; No rales, rhonchi, wheezing, or rubs  HEART: Regular rate and rhythm; No murmurs, rubs, or gallops  ABDOMEN: Soft, Nontender, Nondistended; Bowel sounds present  EXTREMITIES:  WWP, No clubbing, cyanosis, or edema  Vascular: 2+ peripheral pulses x4  LYMPH: No lymphadenopathy noted  SKIN: No rashes or lesions    Consultant(s) Notes Reviewed:  [x ] YES  [ ] NO  Care Discussed with Consultants/Other Providers [ x] YES  [ ] NO    LABS:                        10.3   16.99 )-----------( 317      ( 15 Jul 2023 10:35 )             35.6     07-15    138  |  100  |  48<H>  ----------------------------<  180<H>  5.2   |  24  |  1.61<H>    Ca    8.8      15 Jul 2023 10:35    TPro  6.6  /  Alb  4.0  /  TBili  0.5  /  DBili  x   /  AST  16  /  ALT  10  /  AlkPhos  67  07-15        Urinalysis Basic - ( 15 Jul 2023 10:35 )    Color: x / Appearance: x / SG: x / pH: x  Gluc: 180 mg/dL / Ketone: x  / Bili: x / Urobili: x   Blood: x / Protein: x / Nitrite: x   Leuk Esterase: x / RBC: x / WBC x   Sq Epi: x / Non Sq Epi: x / Bacteria: x      CAPILLARY BLOOD GLUCOSE      POCT Blood Glucose.: 225 mg/dL (15 Jul 2023 21:07)  POCT Blood Glucose.: 127 mg/dL (15 Jul 2023 16:39)  POCT Blood Glucose.: 187 mg/dL (15 Jul 2023 11:52)  POCT Blood Glucose.: 175 mg/dL (15 Jul 2023 07:37)        Urinalysis Basic - ( 15 Jul 2023 10:35 )    Color: x / Appearance: x / SG: x / pH: x  Gluc: 180 mg/dL / Ketone: x  / Bili: x / Urobili: x   Blood: x / Protein: x / Nitrite: x   Leuk Esterase: x / RBC: x / WBC x   Sq Epi: x / Non Sq Epi: x / Bacteria: x        RADIOLOGY & ADDITIONAL TESTS:    Imaging Personally Reviewed:  [ ] YES  [ ] NO    HEALTH ISSUES - PROBLEM Dx:  Transplanted heart    Nicole syndrome    Receiving intravenous antibiotic treatment at home    Chronic atrial fibrillation    Hypertension    Chronic kidney disease (CKD)    Encounter for deep vein thrombosis (DVT) prophylaxis    Type 2 diabetes mellitus    Systemic inflammatory response syndrome (SIRS)    Anemia         YVONNE BILLY  73y  Male    Patient is a 73y old  Male who presents with a chief complaint of sepsis w/u (15 Jul 2023 12:15)    INTERVAL HPI/OVERNIGHT EVENTS:  - feeling well this AM, no new concerns. Denies subjective fevers, chills, malaise, chest pain, dyspnea, palpitations, abdominal pain, n/v/d,     T(C): 36.4 (07-16-23 @ 00:01), Max: 36.6 (07-15-23 @ 09:10)  HR: 99 (07-16-23 @ 04:56) (96 - 99)  BP: 136/89 (07-16-23 @ 04:56) (107/72 - 136/89)  RR: 18 (07-16-23 @ 00:01) (18 - 18)  SpO2: 98% (07-16-23 @ 00:01) (96% - 98%)  Wt(kg): --Vital Signs Last 24 Hrs  T(C): 36.4 (16 Jul 2023 00:01), Max: 36.6 (15 Jul 2023 09:10)  T(F): 97.5 (16 Jul 2023 00:01), Max: 97.9 (15 Jul 2023 15:57)  HR: 99 (16 Jul 2023 04:56) (96 - 99)  BP: 136/89 (16 Jul 2023 04:56) (107/72 - 136/89)  BP(mean): --  RR: 18 (16 Jul 2023 00:01) (18 - 18)  SpO2: 98% (16 Jul 2023 00:01) (96% - 98%)    Parameters below as of 16 Jul 2023 00:01  Patient On (Oxygen Delivery Method): room air        PHYSICAL EXAM:  GENERAL: NAD, well-groomed, well-developed  HEAD:  Atraumatic, Normocephalic  EYES: EOMI, PERRLA, conjunctiva and sclera clear  ENMT: No tonsillar erythema, exudates, or enlargement; Moist mucous membranes, Good dentition, No lesions  NECK: Supple, No JVD, Normal thyroid  NERVOUS SYSTEM:  Alert & Oriented X3, Good concentration; Motor Strength 5/5 B/L upper and lower extremities; DTRs 2+ intact and symmetric  CHEST/LUNG: Clear to auscultation bilaterally; No rales, rhonchi, wheezing, or rubs  HEART: Regular rate and rhythm; No murmurs, rubs, or gallops  ABDOMEN: Soft, Nontender, Nondistended; Bowel sounds present  EXTREMITIES:  WWP, No clubbing, cyanosis, or edema  Vascular: 2+ peripheral pulses x4  LYMPH: No lymphadenopathy noted  SKIN: No rashes or lesions    Consultant(s) Notes Reviewed:  [x ] YES  [ ] NO  Care Discussed with Consultants/Other Providers [ x] YES  [ ] NO    LABS:                        10.3   16.99 )-----------( 317      ( 15 Jul 2023 10:35 )             35.6     07-15    138  |  100  |  48<H>  ----------------------------<  180<H>  5.2   |  24  |  1.61<H>    Ca    8.8      15 Jul 2023 10:35    TPro  6.6  /  Alb  4.0  /  TBili  0.5  /  DBili  x   /  AST  16  /  ALT  10  /  AlkPhos  67  07-15        Urinalysis Basic - ( 15 Jul 2023 10:35 )    Color: x / Appearance: x / SG: x / pH: x  Gluc: 180 mg/dL / Ketone: x  / Bili: x / Urobili: x   Blood: x / Protein: x / Nitrite: x   Leuk Esterase: x / RBC: x / WBC x   Sq Epi: x / Non Sq Epi: x / Bacteria: x      CAPILLARY BLOOD GLUCOSE      POCT Blood Glucose.: 225 mg/dL (15 Jul 2023 21:07)  POCT Blood Glucose.: 127 mg/dL (15 Jul 2023 16:39)  POCT Blood Glucose.: 187 mg/dL (15 Jul 2023 11:52)  POCT Blood Glucose.: 175 mg/dL (15 Jul 2023 07:37)        Urinalysis Basic - ( 15 Jul 2023 10:35 )    Color: x / Appearance: x / SG: x / pH: x  Gluc: 180 mg/dL / Ketone: x  / Bili: x / Urobili: x   Blood: x / Protein: x / Nitrite: x   Leuk Esterase: x / RBC: x / WBC x   Sq Epi: x / Non Sq Epi: x / Bacteria: x        RADIOLOGY & ADDITIONAL TESTS:    Imaging Personally Reviewed:  [ ] YES  [ ] NO    HEALTH ISSUES - PROBLEM Dx:  Transplanted heart    Nicole syndrome    Receiving intravenous antibiotic treatment at home    Chronic atrial fibrillation    Hypertension    Chronic kidney disease (CKD)    Encounter for deep vein thrombosis (DVT) prophylaxis    Type 2 diabetes mellitus    Systemic inflammatory response syndrome (SIRS)    Anemia         BILLY RUSSELL  73y  Male    Patient is a 73y old  Male who presents with a chief complaint of sepsis w/u (15 Jul 2023 12:15)    INTERVAL HPI/OVERNIGHT EVENTS:  - feeling well this AM, no new concerns. Denies subjective fevers, chills, malaise, chest pain, dyspnea, palpitations, abdominal pain, n/v/d. Feeling ready to go home but understands that he will be here until tomorrow when home services resume.   - K 6.0 this AM     T(C): 36.4 (07-16-23 @ 00:01), Max: 36.6 (07-15-23 @ 09:10)  HR: 99 (07-16-23 @ 04:56) (96 - 99)  BP: 136/89 (07-16-23 @ 04:56) (107/72 - 136/89)  RR: 18 (07-16-23 @ 00:01) (18 - 18)  SpO2: 98% (07-16-23 @ 00:01) (96% - 98%)  Wt(kg): --Vital Signs Last 24 Hrs  T(C): 36.4 (16 Jul 2023 00:01), Max: 36.6 (15 Jul 2023 09:10)  T(F): 97.5 (16 Jul 2023 00:01), Max: 97.9 (15 Jul 2023 15:57)  HR: 99 (16 Jul 2023 04:56) (96 - 99)  BP: 136/89 (16 Jul 2023 04:56) (107/72 - 136/89)  BP(mean): --  RR: 18 (16 Jul 2023 00:01) (18 - 18)  SpO2: 98% (16 Jul 2023 00:01) (96% - 98%)    Parameters below as of 16 Jul 2023 00:01  Patient On (Oxygen Delivery Method): room air    PHYSICAL EXAM:  GENERAL: NAD, non-toxic appearing, sitting upright in bed  HEAD:  Atraumatic, Normocephalic  EYES: EOMI, PERRLA, conjunctiva and sclera clear, no jaundice   ENMT: Moist mucous membranes  HEART: Regular rate and rhythm; normal S1/S2  RESPIRATORY: CTA B/L, No W/R/R  ABDOMEN: Soft, Nontender, Nondistended.  NEUROLOGY: A&O x4  EXTREMITIES:  2+ Peripheral Pulses, No clubbing or cyanosis. Mild LE pitting edema, improved  SKIN: no rashes or lesions    Consultant(s) Notes Reviewed:  [x ] YES  [ ] NO  Care Discussed with Consultants/Other Providers [ x] YES  [ ] NO    LABS:                        10.3   16.99 )-----------( 317      ( 15 Jul 2023 10:35 )             35.6     07-15    138  |  100  |  48<H>  ----------------------------<  180<H>  5.2   |  24  |  1.61<H>    Ca    8.8      15 Jul 2023 10:35    TPro  6.6  /  Alb  4.0  /  TBili  0.5  /  DBili  x   /  AST  16  /  ALT  10  /  AlkPhos  67  07-15        Urinalysis Basic - ( 15 Jul 2023 10:35 )    Color: x / Appearance: x / SG: x / pH: x  Gluc: 180 mg/dL / Ketone: x  / Bili: x / Urobili: x   Blood: x / Protein: x / Nitrite: x   Leuk Esterase: x / RBC: x / WBC x   Sq Epi: x / Non Sq Epi: x / Bacteria: x      CAPILLARY BLOOD GLUCOSE      POCT Blood Glucose.: 225 mg/dL (15 Jul 2023 21:07)  POCT Blood Glucose.: 127 mg/dL (15 Jul 2023 16:39)  POCT Blood Glucose.: 187 mg/dL (15 Jul 2023 11:52)  POCT Blood Glucose.: 175 mg/dL (15 Jul 2023 07:37)        Urinalysis Basic - ( 15 Jul 2023 10:35 )    Color: x / Appearance: x / SG: x / pH: x  Gluc: 180 mg/dL / Ketone: x  / Bili: x / Urobili: x   Blood: x / Protein: x / Nitrite: x   Leuk Esterase: x / RBC: x / WBC x   Sq Epi: x / Non Sq Epi: x / Bacteria: x        RADIOLOGY & ADDITIONAL TESTS:    Imaging Personally Reviewed:  [ ] YES  [ ] NO    HEALTH ISSUES - PROBLEM Dx:  Transplanted heart    Nicole syndrome    Receiving intravenous antibiotic treatment at home    Chronic atrial fibrillation    Hypertension    Chronic kidney disease (CKD)    Encounter for deep vein thrombosis (DVT) prophylaxis    Type 2 diabetes mellitus    Systemic inflammatory response syndrome (SIRS)    Anemia

## 2023-07-16 NOTE — PROGRESS NOTE ADULT - ATTENDING COMMENTS
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   Feeling well, denies any localizing symptoms. decreased BS at R base, +b/l LE edema L>R     - CT C/A/P with ?RLL consolidation, which was noted on prior imaging as well, ?PNA - pulm consulted, appears chronic on imaging, no role for bronch   - f/u culture data --> 1/2 Bcx +CoNS, suspect contamination- repeat cx sent this AM  - ID following, plan to c/w meropenem for 7d total (completed abx 7/14) and monitor off afterwards   - tacro increased to 3.5mg BID by HF team, f/u tacro level, f/u HF regarding tacro dosing   - c/w prednisone 30mg for Nicole syndrome, hematology input appreciated   -  HF following, appears near euvolemia - c/w current bumex dose   - Cr fluctuating, unclear if NORMAN vs CKD - Cr stable ~1.6 today, c/t monitor, check lytes   - d/c planning Monday 7/17 when home services resumed     #leukocytosis, SIRS  #s/p heart transplant   #Nicole syndrome  #NORMAN  #T2DM   #chronic Afib. 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   Feeling well, denies any localizing symptoms. decreased BS at R base, +b/l LE edema L>R     - CT C/A/P with ?RLL consolidation, which was noted on prior imaging as well, ?PNA - pulm consulted, appears chronic on imaging, no role for bronch   - f/u culture data --> 1/2 Bcx +CoNS, suspect contamination- repeat cx sent this AM  - ID following, plan to c/w meropenem for 7d total (completed abx 7/14) and monitor off afterwards   - tacro increased to 3.5mg BID by HF team, f/u tacro level, f/u HF regarding tacro dosing   - hyperK 60. today, ?2/2 tacro, NORMAN is better than prior days - will give lokelma, recheck BMP this evening, f/u HF about tacro dosing   - c/w prednisone 30mg for Nicole syndrome, hematology input appreciated   -  HF following, appears near euvolemia - c/w current bumex dose   - Cr fluctuating, unclear if NORMAN vs CKD - Cr stable ~1.6 today, c/t monitor, check lytes   - d/c planning Monday 7/17 when home services resumed     #leukocytosis, SIRS  #s/p heart transplant   #Nicole syndrome  #NORMAN  #T2DM   #chronic Afib.

## 2023-07-17 ENCOUNTER — TRANSCRIPTION ENCOUNTER (OUTPATIENT)
Age: 73
End: 2023-07-17

## 2023-07-17 LAB
ALBUMIN SERPL ELPH-MCNC: 4.2 G/DL — SIGNIFICANT CHANGE UP (ref 3.3–5)
ALP SERPL-CCNC: 71 U/L — SIGNIFICANT CHANGE UP (ref 40–120)
ALT FLD-CCNC: 11 U/L — SIGNIFICANT CHANGE UP (ref 10–45)
ANION GAP SERPL CALC-SCNC: 15 MMOL/L — SIGNIFICANT CHANGE UP (ref 5–17)
AST SERPL-CCNC: 21 U/L — SIGNIFICANT CHANGE UP (ref 10–40)
BILIRUB SERPL-MCNC: 0.4 MG/DL — SIGNIFICANT CHANGE UP (ref 0.2–1.2)
BUN SERPL-MCNC: 47 MG/DL — HIGH (ref 7–23)
CALCIUM SERPL-MCNC: 9.2 MG/DL — SIGNIFICANT CHANGE UP (ref 8.4–10.5)
CHLORIDE SERPL-SCNC: 100 MMOL/L — SIGNIFICANT CHANGE UP (ref 96–108)
CO2 SERPL-SCNC: 24 MMOL/L — SIGNIFICANT CHANGE UP (ref 22–31)
CREAT SERPL-MCNC: 1.44 MG/DL — HIGH (ref 0.5–1.3)
EGFR: 51 ML/MIN/1.73M2 — LOW
GLUCOSE BLDC GLUCOMTR-MCNC: 134 MG/DL — HIGH (ref 70–99)
GLUCOSE BLDC GLUCOMTR-MCNC: 202 MG/DL — HIGH (ref 70–99)
GLUCOSE BLDC GLUCOMTR-MCNC: 204 MG/DL — HIGH (ref 70–99)
GLUCOSE BLDC GLUCOMTR-MCNC: 222 MG/DL — HIGH (ref 70–99)
GLUCOSE SERPL-MCNC: 185 MG/DL — HIGH (ref 70–99)
HCT VFR BLD CALC: 34.9 % — LOW (ref 39–50)
HGB BLD-MCNC: 10.3 G/DL — LOW (ref 13–17)
LDH SERPL L TO P-CCNC: 368 U/L — HIGH (ref 50–242)
MAGNESIUM SERPL-MCNC: 1.9 MG/DL — SIGNIFICANT CHANGE UP (ref 1.6–2.6)
MCHC RBC-ENTMCNC: 28.4 PG — SIGNIFICANT CHANGE UP (ref 27–34)
MCHC RBC-ENTMCNC: 29.5 GM/DL — LOW (ref 32–36)
MCV RBC AUTO: 96.1 FL — SIGNIFICANT CHANGE UP (ref 80–100)
NRBC # BLD: 0 /100 WBCS — SIGNIFICANT CHANGE UP (ref 0–0)
PHOSPHATE SERPL-MCNC: 2.9 MG/DL — SIGNIFICANT CHANGE UP (ref 2.5–4.5)
PLATELET # BLD AUTO: 312 K/UL — SIGNIFICANT CHANGE UP (ref 150–400)
POTASSIUM SERPL-MCNC: 5.1 MMOL/L — SIGNIFICANT CHANGE UP (ref 3.5–5.3)
POTASSIUM SERPL-SCNC: 5.1 MMOL/L — SIGNIFICANT CHANGE UP (ref 3.5–5.3)
PROT SERPL-MCNC: 6.7 G/DL — SIGNIFICANT CHANGE UP (ref 6–8.3)
RBC # BLD: 3.63 M/UL — LOW (ref 4.2–5.8)
RBC # FLD: 19 % — HIGH (ref 10.3–14.5)
SODIUM SERPL-SCNC: 139 MMOL/L — SIGNIFICANT CHANGE UP (ref 135–145)
TACROLIMUS SERPL-MCNC: 5.6 NG/ML — SIGNIFICANT CHANGE UP
WBC # BLD: 17.43 K/UL — HIGH (ref 3.8–10.5)
WBC # FLD AUTO: 17.43 K/UL — HIGH (ref 3.8–10.5)

## 2023-07-17 PROCEDURE — 99232 SBSQ HOSP IP/OBS MODERATE 35: CPT

## 2023-07-17 PROCEDURE — 99232 SBSQ HOSP IP/OBS MODERATE 35: CPT | Mod: GC

## 2023-07-17 RX ORDER — IRON SUCROSE 20 MG/ML
100 INJECTION, SOLUTION INTRAVENOUS
Refills: 0 | DISCHARGE

## 2023-07-17 RX ORDER — ATORVASTATIN CALCIUM 80 MG/1
1 TABLET, FILM COATED ORAL
Refills: 0 | DISCHARGE

## 2023-07-17 RX ORDER — PEN NEEDLE, DIABETIC 32 GX 1/4"
32G X 6 MM NEEDLE, DISPOSABLE MISCELLANEOUS
Qty: 1 | Refills: 5 | Status: ACTIVE | COMMUNITY
Start: 2023-03-10 | End: 1900-01-01

## 2023-07-17 RX ORDER — ISOPROPYL ALCOHOL 0.7 ML/ML
SWAB TOPICAL
Qty: 1 | Refills: 5 | Status: ACTIVE | COMMUNITY
Start: 2023-03-10 | End: 1900-01-01

## 2023-07-17 RX ADMIN — LATANOPROST 1 DROP(S): 0.05 SOLUTION/ DROPS OPHTHALMIC; TOPICAL at 20:55

## 2023-07-17 RX ADMIN — PANTOPRAZOLE SODIUM 40 MILLIGRAM(S): 20 TABLET, DELAYED RELEASE ORAL at 05:34

## 2023-07-17 RX ADMIN — Medication 3 MILLIGRAM(S): at 20:54

## 2023-07-17 RX ADMIN — SODIUM ZIRCONIUM CYCLOSILICATE 10 GRAM(S): 10 POWDER, FOR SUSPENSION ORAL at 17:13

## 2023-07-17 RX ADMIN — APIXABAN 5 MILLIGRAM(S): 2.5 TABLET, FILM COATED ORAL at 17:18

## 2023-07-17 RX ADMIN — VALGANCICLOVIR 450 MILLIGRAM(S): 450 TABLET, FILM COATED ORAL at 08:16

## 2023-07-17 RX ADMIN — Medication 500000 UNIT(S): at 20:54

## 2023-07-17 RX ADMIN — ATORVASTATIN CALCIUM 10 MILLIGRAM(S): 80 TABLET, FILM COATED ORAL at 20:54

## 2023-07-17 RX ADMIN — Medication 81 MILLIGRAM(S): at 11:04

## 2023-07-17 RX ADMIN — Medication 500000 UNIT(S): at 14:07

## 2023-07-17 RX ADMIN — TACROLIMUS 3.5 MILLIGRAM(S): 5 CAPSULE ORAL at 20:55

## 2023-07-17 RX ADMIN — TACROLIMUS 3.5 MILLIGRAM(S): 5 CAPSULE ORAL at 11:04

## 2023-07-17 RX ADMIN — Medication 10 MILLIGRAM(S): at 14:08

## 2023-07-17 RX ADMIN — Medication 10 MILLIGRAM(S): at 20:50

## 2023-07-17 RX ADMIN — Medication 2: at 11:54

## 2023-07-17 RX ADMIN — BUMETANIDE 1 MILLIGRAM(S): 0.25 INJECTION INTRAMUSCULAR; INTRAVENOUS at 05:35

## 2023-07-17 RX ADMIN — Medication 100 MILLIGRAM(S): at 05:34

## 2023-07-17 RX ADMIN — Medication 2: at 08:16

## 2023-07-17 RX ADMIN — Medication 1 TABLET(S): at 08:16

## 2023-07-17 RX ADMIN — Medication 10 MILLIGRAM(S): at 05:35

## 2023-07-17 RX ADMIN — Medication 500000 UNIT(S): at 05:35

## 2023-07-17 RX ADMIN — APIXABAN 5 MILLIGRAM(S): 2.5 TABLET, FILM COATED ORAL at 05:34

## 2023-07-17 NOTE — PROGRESS NOTE ADULT - PROBLEM SELECTOR PLAN 9
Diet: DASH with CC   Dispo: Likely Monday 7/17 when home services resume  DVT: On Eliquis  Code: FULL Diet: DASH with CC   Dispo: 7/18  DVT: On Eliquis  Code: FULL Diet: DASH with CC   Dispo: Plan for d/c 7/18  DVT: On Eliquis  Code: FULL

## 2023-07-17 NOTE — PROGRESS NOTE ADULT - SUBJECTIVE AND OBJECTIVE BOX
DATE OF SERVICE: 07-17-23 @ 08:22    Patient is a 73y old  Male who presents with a chief complaint of sepsis w/u (16 Jul 2023 07:02)      SUBJECTIVE / OVERNIGHT EVENTS: No acute events overnight. Patient endorses sleeping well overnight. He denies any chest pain, abdominal pain, shortness of breath, fever, chills, dysuria, hematuria, nausea, vomiting or diarrhea. Patient states his neck feels slightly swollen from past 2 days.     MEDICATIONS  (STANDING):  apixaban 5 milliGRAM(s) Oral every 12 hours  aspirin  chewable 81 milliGRAM(s) Oral daily  atorvastatin 10 milliGRAM(s) Oral at bedtime  buMETAnide 1 milliGRAM(s) Oral daily  dextrose 5%. 1000 milliLiter(s) (50 mL/Hr) IV Continuous <Continuous>  dextrose 5%. 1000 milliLiter(s) (100 mL/Hr) IV Continuous <Continuous>  dextrose 50% Injectable 25 Gram(s) IV Push once  dextrose 50% Injectable 12.5 Gram(s) IV Push once  dextrose 50% Injectable 25 Gram(s) IV Push once  glucagon  Injectable 1 milliGRAM(s) IntraMuscular once  insulin lispro (ADMELOG) corrective regimen sliding scale   SubCutaneous at bedtime  insulin lispro (ADMELOG) corrective regimen sliding scale   SubCutaneous three times a day before meals  latanoprost 0.005% Ophthalmic Solution 1 Drop(s) Both EYES at bedtime  metoprolol succinate  milliGRAM(s) Oral daily  nystatin    Suspension 434212 Unit(s) Oral three times a day  pantoprazole    Tablet 40 milliGRAM(s) Oral before breakfast  predniSONE   Tablet 10 milliGRAM(s) Oral three times a day  sodium zirconium cyclosilicate 10 Gram(s) Oral <User Schedule>  tacrolimus 3.5 milliGRAM(s) Oral <User Schedule>  trimethoprim   80 mG/sulfamethoxazole 400 mG 1 Tablet(s) Oral <User Schedule>  valGANciclovir 450 milliGRAM(s) Oral <User Schedule>    MEDICATIONS  (PRN):  acetaminophen     Tablet .. 650 milliGRAM(s) Oral every 6 hours PRN Temp greater or equal to 38C (100.4F), Mild Pain (1 - 3)  dextrose Oral Gel 15 Gram(s) Oral once PRN Blood Glucose LESS THAN 70 milliGRAM(s)/deciliter  melatonin 3 milliGRAM(s) Oral at bedtime PRN Insomnia      Vital Signs Last 24 Hrs  T(C): 36.3 (17 Jul 2023 06:04), Max: 36.4 (16 Jul 2023 10:07)  T(F): 97.4 (17 Jul 2023 06:04), Max: 97.6 (16 Jul 2023 16:18)  HR: 113 (17 Jul 2023 05:36) (95 - 114)  BP: 138/89 (17 Jul 2023 05:36) (132/88 - 138/89)  BP(mean): --  RR: 18 (16 Jul 2023 16:18) (18 - 18)  SpO2: 99% (16 Jul 2023 16:18) (99% - 99%)    Parameters below as of 16 Jul 2023 16:18  Patient On (Oxygen Delivery Method): room air      CAPILLARY BLOOD GLUCOSE      POCT Blood Glucose.: 202 mg/dL (17 Jul 2023 07:51)  POCT Blood Glucose.: 273 mg/dL (16 Jul 2023 21:08)  POCT Blood Glucose.: 179 mg/dL (16 Jul 2023 16:40)  POCT Blood Glucose.: 188 mg/dL (16 Jul 2023 11:49)    I&O's Summary    16 Jul 2023 07:01  -  17 Jul 2023 07:00  --------------------------------------------------------  IN: 770 mL / OUT: 1200 mL / NET: -430 mL        PHYSICAL EXAM:  GENERAL: NAD, well-developed, sitting upright in bed, comfortable  HEAD:  Atraumatic, Normocephalic  EYES: EOMI, PERRLA, conjunctiva and sclera clear  NECK: Supple, No JVD  CHEST/LUNG: Clear to auscultation bilaterally; No wheeze  HEART: Regular rate and rhythm; No murmurs, rubs, or gallops  ABDOMEN: Soft, Nontender, Nondistended; Bowel sounds present  EXTREMITIES:  2+ Peripheral Pulses, No clubbing, cyanosis, or edema  PSYCH: AAOx3  NEUROLOGY: non-focal  SKIN: No rashes or lesions    LABS:                        10.3   16.27 )-----------( 279      ( 16 Jul 2023 10:11 )             35.1     07-16    139  |  100  |  47<H>  ----------------------------<  191<H>  4.9   |  22  |  1.45<H>    Ca    9.4      16 Jul 2023 18:03  Phos  3.3     07-16  Mg     2.0     07-16    TPro  6.5  /  Alb  4.0  /  TBili  0.5  /  DBili  x   /  AST  21  /  ALT  12  /  AlkPhos  69  07-16          Urinalysis Basic - ( 16 Jul 2023 18:03 )    Color: x / Appearance: x / SG: x / pH: x  Gluc: 191 mg/dL / Ketone: x  / Bili: x / Urobili: x   Blood: x / Protein: x / Nitrite: x   Leuk Esterase: x / RBC: x / WBC x   Sq Epi: x / Non Sq Epi: x / Bacteria: x        RADIOLOGY & ADDITIONAL TESTS:    Imaging Personally Reviewed:    Consultant(s) Notes Reviewed:      Care Discussed with Consultants/Other Providers:   DATE OF SERVICE: 07-17-23 @ 08:22    Patient is a 73y old  Male who presents with a chief complaint of sepsis w/u (16 Jul 2023 07:02)      SUBJECTIVE / OVERNIGHT EVENTS: No acute events overnight. Patient endorses sleeping well overnight. He denies any chest pain, abdominal pain, shortness of breath, fever, chills, dysuria, hematuria, nausea, vomiting or diarrhea. Patient states his neck feels slightly swollen from past 2 days.     MEDICATIONS  (STANDING):  apixaban 5 milliGRAM(s) Oral every 12 hours  aspirin  chewable 81 milliGRAM(s) Oral daily  atorvastatin 10 milliGRAM(s) Oral at bedtime  buMETAnide 1 milliGRAM(s) Oral daily  dextrose 5%. 1000 milliLiter(s) (50 mL/Hr) IV Continuous <Continuous>  dextrose 5%. 1000 milliLiter(s) (100 mL/Hr) IV Continuous <Continuous>  dextrose 50% Injectable 25 Gram(s) IV Push once  dextrose 50% Injectable 12.5 Gram(s) IV Push once  dextrose 50% Injectable 25 Gram(s) IV Push once  glucagon  Injectable 1 milliGRAM(s) IntraMuscular once  insulin lispro (ADMELOG) corrective regimen sliding scale   SubCutaneous at bedtime  insulin lispro (ADMELOG) corrective regimen sliding scale   SubCutaneous three times a day before meals  latanoprost 0.005% Ophthalmic Solution 1 Drop(s) Both EYES at bedtime  metoprolol succinate  milliGRAM(s) Oral daily  nystatin    Suspension 561142 Unit(s) Oral three times a day  pantoprazole    Tablet 40 milliGRAM(s) Oral before breakfast  predniSONE   Tablet 10 milliGRAM(s) Oral three times a day  sodium zirconium cyclosilicate 10 Gram(s) Oral <User Schedule>  tacrolimus 3.5 milliGRAM(s) Oral <User Schedule>  trimethoprim   80 mG/sulfamethoxazole 400 mG 1 Tablet(s) Oral <User Schedule>  valGANciclovir 450 milliGRAM(s) Oral <User Schedule>    MEDICATIONS  (PRN):  acetaminophen     Tablet .. 650 milliGRAM(s) Oral every 6 hours PRN Temp greater or equal to 38C (100.4F), Mild Pain (1 - 3)  dextrose Oral Gel 15 Gram(s) Oral once PRN Blood Glucose LESS THAN 70 milliGRAM(s)/deciliter  melatonin 3 milliGRAM(s) Oral at bedtime PRN Insomnia      Vital Signs Last 24 Hrs  T(C): 36.3 (17 Jul 2023 06:04), Max: 36.4 (16 Jul 2023 10:07)  T(F): 97.4 (17 Jul 2023 06:04), Max: 97.6 (16 Jul 2023 16:18)  HR: 113 (17 Jul 2023 05:36) (95 - 114)  BP: 138/89 (17 Jul 2023 05:36) (132/88 - 138/89)  BP(mean): --  RR: 18 (16 Jul 2023 16:18) (18 - 18)  SpO2: 99% (16 Jul 2023 16:18) (99% - 99%)    Parameters below as of 16 Jul 2023 16:18  Patient On (Oxygen Delivery Method): room air      CAPILLARY BLOOD GLUCOSE      POCT Blood Glucose.: 202 mg/dL (17 Jul 2023 07:51)  POCT Blood Glucose.: 273 mg/dL (16 Jul 2023 21:08)  POCT Blood Glucose.: 179 mg/dL (16 Jul 2023 16:40)  POCT Blood Glucose.: 188 mg/dL (16 Jul 2023 11:49)    I&O's Summary    16 Jul 2023 07:01  -  17 Jul 2023 07:00  --------------------------------------------------------  IN: 770 mL / OUT: 1200 mL / NET: -430 mL        PHYSICAL EXAM:  GENERAL: NAD, well-developed, sitting upright in bed, comfortable  HEAD:  Atraumatic, Normocephalic  EYES: EOMI, PERRLA, conjunctiva and sclera clear  NECK: Supple, No JVD  CHEST/LUNG: Coarse breath sounds left upper lung  HEART: Regular rate and rhythm; Blowing murmur at left sternal border  ABDOMEN: Soft, Nontender, Nondistended; Bowel sounds present  EXTREMITIES:  2+ Peripheral Pulses, No clubbing, cyanosis, or edema  PSYCH: AAOx3  NEUROLOGY: Intact  SKIN: No rashes or lesions    LABS:                        10.3   16.27 )-----------( 279      ( 16 Jul 2023 10:11 )             35.1     07-16    139  |  100  |  47<H>  ----------------------------<  191<H>  4.9   |  22  |  1.45<H>    Ca    9.4      16 Jul 2023 18:03  Phos  3.3     07-16  Mg     2.0     07-16    TPro  6.5  /  Alb  4.0  /  TBili  0.5  /  DBili  x   /  AST  21  /  ALT  12  /  AlkPhos  69  07-16          Urinalysis Basic - ( 16 Jul 2023 18:03 )    Color: x / Appearance: x / SG: x / pH: x  Gluc: 191 mg/dL / Ketone: x  / Bili: x / Urobili: x   Blood: x / Protein: x / Nitrite: x   Leuk Esterase: x / RBC: x / WBC x   Sq Epi: x / Non Sq Epi: x / Bacteria: x        RADIOLOGY & ADDITIONAL TESTS:    Imaging Personally Reviewed:    Consultant(s) Notes Reviewed:      Care Discussed with Consultants/Other Providers:   DATE OF SERVICE: 07-17-23 @ 08:22    Patient is a 73y old  Male who presents with a chief complaint of sepsis w/u (16 Jul 2023 07:02)      SUBJECTIVE / OVERNIGHT EVENTS: No acute events overnight. Patient endorses sleeping well overnight. He denies any chest pain, abdominal pain, shortness of breath, fever, chills, dysuria, hematuria, nausea, vomiting or diarrhea. Patient states his neck feels slightly swollen from past 2 days. Plan was to discharge patient home today but his medications cannot be delivered home until tomorrow morning, d/c home tomorrow (7/18).     MEDICATIONS  (STANDING):  apixaban 5 milliGRAM(s) Oral every 12 hours  aspirin  chewable 81 milliGRAM(s) Oral daily  atorvastatin 10 milliGRAM(s) Oral at bedtime  buMETAnide 1 milliGRAM(s) Oral daily  dextrose 5%. 1000 milliLiter(s) (50 mL/Hr) IV Continuous <Continuous>  dextrose 5%. 1000 milliLiter(s) (100 mL/Hr) IV Continuous <Continuous>  dextrose 50% Injectable 25 Gram(s) IV Push once  dextrose 50% Injectable 12.5 Gram(s) IV Push once  dextrose 50% Injectable 25 Gram(s) IV Push once  glucagon  Injectable 1 milliGRAM(s) IntraMuscular once  insulin lispro (ADMELOG) corrective regimen sliding scale   SubCutaneous at bedtime  insulin lispro (ADMELOG) corrective regimen sliding scale   SubCutaneous three times a day before meals  latanoprost 0.005% Ophthalmic Solution 1 Drop(s) Both EYES at bedtime  metoprolol succinate  milliGRAM(s) Oral daily  nystatin    Suspension 329822 Unit(s) Oral three times a day  pantoprazole    Tablet 40 milliGRAM(s) Oral before breakfast  predniSONE   Tablet 10 milliGRAM(s) Oral three times a day  sodium zirconium cyclosilicate 10 Gram(s) Oral <User Schedule>  tacrolimus 3.5 milliGRAM(s) Oral <User Schedule>  trimethoprim   80 mG/sulfamethoxazole 400 mG 1 Tablet(s) Oral <User Schedule>  valGANciclovir 450 milliGRAM(s) Oral <User Schedule>    MEDICATIONS  (PRN):  acetaminophen     Tablet .. 650 milliGRAM(s) Oral every 6 hours PRN Temp greater or equal to 38C (100.4F), Mild Pain (1 - 3)  dextrose Oral Gel 15 Gram(s) Oral once PRN Blood Glucose LESS THAN 70 milliGRAM(s)/deciliter  melatonin 3 milliGRAM(s) Oral at bedtime PRN Insomnia      Vital Signs Last 24 Hrs  T(C): 36.3 (17 Jul 2023 06:04), Max: 36.4 (16 Jul 2023 10:07)  T(F): 97.4 (17 Jul 2023 06:04), Max: 97.6 (16 Jul 2023 16:18)  HR: 113 (17 Jul 2023 05:36) (95 - 114)  BP: 138/89 (17 Jul 2023 05:36) (132/88 - 138/89)  BP(mean): --  RR: 18 (16 Jul 2023 16:18) (18 - 18)  SpO2: 99% (16 Jul 2023 16:18) (99% - 99%)    Parameters below as of 16 Jul 2023 16:18  Patient On (Oxygen Delivery Method): room air      CAPILLARY BLOOD GLUCOSE      POCT Blood Glucose.: 202 mg/dL (17 Jul 2023 07:51)  POCT Blood Glucose.: 273 mg/dL (16 Jul 2023 21:08)  POCT Blood Glucose.: 179 mg/dL (16 Jul 2023 16:40)  POCT Blood Glucose.: 188 mg/dL (16 Jul 2023 11:49)    I&O's Summary    16 Jul 2023 07:01  -  17 Jul 2023 07:00  --------------------------------------------------------  IN: 770 mL / OUT: 1200 mL / NET: -430 mL        PHYSICAL EXAM:  GENERAL: NAD, well-developed, sitting upright in bed, comfortable  HEAD:  Atraumatic, Normocephalic  EYES: EOMI, PERRLA, conjunctiva and sclera clear  NECK: Supple, No JVD  CHEST/LUNG: Coarse breath sounds left upper lung  HEART: Regular rate and rhythm; Blowing murmur at left sternal border  ABDOMEN: Soft, Nontender, Nondistended; Bowel sounds present  EXTREMITIES:  2+ Peripheral Pulses, No clubbing, cyanosis, or edema    LABS:                        10.3   16.27 )-----------( 279      ( 16 Jul 2023 10:11 )             35.1     07-16    139  |  100  |  47<H>  ----------------------------<  191<H>  4.9   |  22  |  1.45<H>    Ca    9.4      16 Jul 2023 18:03  Phos  3.3     07-16  Mg     2.0     07-16    TPro  6.5  /  Alb  4.0  /  TBili  0.5  /  DBili  x   /  AST  21  /  ALT  12  /  AlkPhos  69  07-16          Urinalysis Basic - ( 16 Jul 2023 18:03 )    Color: x / Appearance: x / SG: x / pH: x  Gluc: 191 mg/dL / Ketone: x  / Bili: x / Urobili: x   Blood: x / Protein: x / Nitrite: x   Leuk Esterase: x / RBC: x / WBC x   Sq Epi: x / Non Sq Epi: x / Bacteria: x        RADIOLOGY & ADDITIONAL TESTS:    Imaging Personally Reviewed:    Consultant(s) Notes Reviewed:      Care Discussed with Consultants/Other Providers:

## 2023-07-17 NOTE — CHART NOTE - NSCHARTNOTEFT_GEN_A_CORE
Patient's chart reviewed. Thrombocytopenia and anemia stable. No recent transfusions. Please recall hematology as needed.    CBC Full  -  ( 17 Jul 2023 10:17 )  WBC Count : 17.43 K/uL  RBC Count : 3.63 M/uL  Hemoglobin : 10.3 g/dL  Hematocrit : 34.9 %  Platelet Count - Automated : 312 K/uL  Mean Cell Volume : 96.1 fl  Mean Cell Hemoglobin : 28.4 pg  Mean Cell Hemoglobin Concentration : 29.5 gm/dL  Auto Neutrophil # : x  Auto Lymphocyte # : x  Auto Monocyte # : x  Auto Eosinophil # : x  Auto Basophil # : x  Auto Neutrophil % : x  Auto Lymphocyte % : x  Auto Monocyte % : x  Auto Eosinophil % : x  Auto Basophil % : x      Fabian Moreau MD, PGY-5  Hematology/Medical Oncology Fellow  Pager: [phone]  Available on Microsoft Teams  After 5pm or on weekends please contact  to page on-call fellow

## 2023-07-17 NOTE — DISCHARGE NOTE NURSING/CASE MANAGEMENT/SOCIAL WORK - NSDCFUADDAPPT_GEN_ALL_CORE_FT
APPTS ARE READY TO BE MADE: [X] YES    Best Family or Patient Contact (if needed):    Additional Information about above appointments (if needed):    1: Infectious disease within 1 week  2: Endocrine within 1 week  3: Transplant Cardiology    Other comments or requests:

## 2023-07-17 NOTE — DISCHARGE NOTE NURSING/CASE MANAGEMENT/SOCIAL WORK - NSDCVIVACCINE_GEN_ALL_CORE_FT
Hep A, adult; 07-Feb-2018 09:02; Kathleen Reyes (RN); [employer]; 7439F; IntraMuscular; Deltoid Right.; 1 milliLiter(s); VIS (VIS Published: 20-Jul-2017, VIS Presented: 07-Feb-2018);   Hep B, adult; 07-Feb-2018 09:09; Kathleen Reyes (RN); [employer]; B39CM; IntraMuscular; Deltoid Left.; 1 milliLiter(s); VIS (VIS Published: 20-Jul-2016, VIS Presented: 07-Feb-2018);   Hep B, adult; 16-Feb-2018 19:00; Brooklynn Coles ([facility]); [employer]; B39CM; IntraMuscular; Deltoid Right.; 1 milliLiter(s); VIS (VIS Published: 20-Jul-2016, VIS Presented: 16-Feb-2018);   influenza, injectable, quadrivalent, preservative free; 02-Oct-2020 12:07; Enriqueta Hassan ([place]); [employer]; [id number] (Exp. Date: 30-Jun-2021); IntraMuscular; Deltoid Right.; 0.5 milliLiter(s); VIS (VIS Published: 15-Aug-2019, VIS Presented: 02-Oct-2020);

## 2023-07-17 NOTE — DISCHARGE NOTE NURSING/CASE MANAGEMENT/SOCIAL WORK - NSDCPEFALRISK_GEN_ALL_CORE
For information on Fall & Injury Prevention, visit: [email or web address] OR  [email or web address] OR  https://www.cdc.gov/steadi/patient.html

## 2023-07-17 NOTE — PROGRESS NOTE ADULT - ASSESSMENT
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 His course was complicated by bilateral IJ/subclavian thrombi, a persistent small right sided pleural effusion, as well as acute on chronic renal failure of unclear etiology. Who presents to ED from HF clinic for ?infection on IV ABT as an outpt. Requiring evaluation given transplant Hx with labs.  Unclear etiology as patient appears well but does not have knowledge of any infections. Leukocytosis present on admission    Would:  Will try to contact Rehab. 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 His course was complicated by bilateral IJ/subclavian thrombi, a persistent small right sided pleural effusion, as well as acute on chronic renal failure of unclear etiology. Who presents to ED from HF clinic for ?infection on IV ABT as an outpt. Requiring evaluation given transplant Hx with labs.  Unclear etiology as patient appears well but does not have knowledge of any infections. Leukocytosis present on admission    Would:  Will try to contact Rehab. facility for further information  Send blood cultures x2 sets  UA is ok-  RVP negative  CXR RLL atelectasis    check ESR and CRP  Sedimentation Rate, Erythrocyte: 60 mm/hr (07.11.23 @ 11:52)    C-Reactive Protein, Serum (07.11.23 @ 10:42)    C-Reactive Protein, Serum: 9 mg/L    completed a week of IV meropenem    leukocytosis persists  if discharged home he should follow up in ID clinic in a week    Gary Lujan MD  Can be called via Teams  After 5pm/weekends [phone]

## 2023-07-17 NOTE — PROGRESS NOTE ADULT - PROBLEM SELECTOR PLAN 2
- appreciate transplant team recs  - tacro increased to 3 mg BID to match home dose (upon admission was started on 2 mg BID but confirmed outpatient dose is 3 mg BID), then increased to 3.5 mg BID based on subtherapeutic tacro level. Continuing 3.5 mg BID dosing per cardiology recs  -daily Tacro level (needs to be obtained ~30 min before AM dose given); goal tacro level 4-6  -transplant SW team follow up to establish plan for Safe discharge when ready - per PT, d/c home with home PT  - repeat TTE stable

## 2023-07-17 NOTE — PROGRESS NOTE ADULT - PROBLEM SELECTOR PLAN 5
Pt previously on admelog 7u tid and glargine 8u at bedtime, however outpatient med rec reveals pt not recently on any insulin  - not on any standing insulin this admission, CBGs at goal  - endocrine consulted for optimization of outpatient insulin regimen Pt previously on admelog 7u tid and glargine 8u at bedtime, however outpatient med rec reveals pt not recently on any insulin  - not on any standing insulin this admission, CBGs at goal  - Per endocrine they recommended Lantus 8 and Admelog 7 before breakfast and 2 before lunch with prednisone 30 mg daily

## 2023-07-17 NOTE — PROGRESS NOTE ADULT - PROBLEM SELECTOR PLAN 8
Intermittently hyperkalemic this admission. K this AM 6.0. Likely contributions from CKD, chronic hemolysis, tacrolimus.   - ordered lokelma, insulin, dextrose, calcium gluconate, EKG. Will f/u EKG and repeat BMP at 1600  - lokelma 10 mg PO QD (patient reportedly on veltassa QD at home)  - reportedly tacrolimus can contribute to hyperkalemia - discussed with clinical pharmacist whether hyperkalemia warrants tacrolimus dose increase, per pharmD will continue at current dose

## 2023-07-17 NOTE — PROGRESS NOTE ADULT - ASSESSMENT
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

## 2023-07-17 NOTE — PROGRESS NOTE ADULT - PROBLEM SELECTOR PLAN 1
Pt w/ leukocytosis and tachycardia on admission, started on IV meropenem on 7/8 at rehab for elevated WBC. No clear source of infx identified. CXR with R basilar atelectasis, CT chest & abdomen with RLL consolidation and R hemidiaphragm elevation, although this seems to be chronic when comparing to prior films. CXR showing R basilar atelectasis. UA unremarkable. RVP negative. Procal 0.11. WBC now downtrending, stable vitals throughout admission, no source of infection identified. Now s/p meropenem course, last dose 7/14. WBC persistently elevated throughout admission.    - ID following, appreciate recs  - will continue to monitor off abx through Monday  - trend WBC  - f/u quantiferon (pt with PPD in forearm from [facility])  - f/u ESR, CRP  - legionella urine ag negative  - serum fungitell, galactomannan negative  - CT chest with RLL consolidation, appears to be chronic when compared with prior scans. Pulmonology consulted, not offering bronchoscopy/BAL due to chronicity of atelectasis, unlikely active infection  - 1/2 blood culture sets growing coagulase negative staph, likely contamination. Repeat blood cultures drawn 7/16, pending Pt w/ leukocytosis and tachycardia on admission, started on IV meropenem on 7/8 at rehab for elevated WBC. No clear source of infx identified. CXR with R basilar atelectasis, CT chest & abdomen with RLL consolidation and R hemidiaphragm elevation, although this seems to be chronic when comparing to prior films. CXR showing R basilar atelectasis. UA unremarkable. RVP negative. Procal 0.11. WBC now downtrending, stable vitals throughout admission, no source of infection identified. Now s/p meropenem course, last dose 7/14. WBC persistently elevated throughout admission.  - ID following, appreciate recs  - trend WBC  - legionella urine ag negative  - serum fungitell, galactomannan negative  - CT chest with RLL consolidation, appears to be chronic when compared with prior scans. Pulmonology consulted, not offering bronchoscopy/BAL due to chronicity of atelectasis, unlikely active infection  - 1/2 blood culture sets growing coagulase negative staph, likely contamination. Repeat blood cultures drawn 7/16, pending

## 2023-07-17 NOTE — PROGRESS NOTE ADULT - ATTENDING COMMENTS
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   Feeling well, denies any localizing symptoms. decreased BS at R base, +b/l LE edema L>R     #leukocytosis, SIRS  #s/p heart transplant   #Nicole syndrome  #NORMAN    - CT C/A/P with ?RLL consolidation, which was noted on prior imaging as well, ?PNA - pulm consulted, appears chronic on imaging, no role for bronch   - f/u culture data --> 1/2 Bcx +CoNS, suspect contamination- repeat cx sent- follow u[  - ID following, s/p IV meropenem for 7d total (completed abx 7/14)    - c/w tacrolimys 3.5mg BID by HF team, f/u tacro level, f/u HF regarding tacro dosing   - hyperK 60. today, ?2/2 tacro, NORMAN is better than prior days - will give lokelma, recheck BMP this evening, f/u HF about tacro dosing   - c/w prednisone 30mg for Nicole syndrome, hematology input appreciated; on PJP ppx with Bactrim TIW  -  euvolemic now- Bumex 1mg PO daily  - Cr fluctuating, unclear if NORMAN vs CKD - Cr stable   - d/c planning7/18 when home services resumed; medications are delivered

## 2023-07-17 NOTE — DISCHARGE NOTE NURSING/CASE MANAGEMENT/SOCIAL WORK - PATIENT PORTAL LINK FT
You can access the FollowMyHealth Patient Portal offered by [facility] by registering at the following website: [email or web address]. By joining [employer]’s FollowMyHealth portal, you will also be able to view your health information using other applications (apps) compatible with our system.

## 2023-07-17 NOTE — PROGRESS NOTE ADULT - PROBLEM SELECTOR PLAN 4
SCr 1.17 -> 1.11 -> 1.59 -> 1.38. On previous admission, SCr as high as 3.71, decreased to 1.74 by d/c.   - unclear baseline, although appears to be developing some component of NORMAN  - trend SCr  - avoid nephrotoxic agents  - trend BMPs

## 2023-07-17 NOTE — PROGRESS NOTE ADULT - SUBJECTIVE AND OBJECTIVE BOX
INFECTIOUS DISEASES FOLLOW UP-- Sujey Lujan  [phone]    This is a follow up note for this  73yMale with  Anemia        ROS:  CONSTITUTIONAL:  No fever, good appetite  CARDIOVASCULAR:  No chest pain or palpitations  RESPIRATORY:  No dyspnea  GASTROINTESTINAL:  No nausea, vomiting, diarrhea, or abdominal pain  GENITOURINARY:  No dysuria  NEUROLOGIC:  No headache,     Allergies    No Known Allergies    Intolerances        ANTIBIOTICS/RELEVANT:  antimicrobials  nystatin    Suspension 401737 Unit(s) Oral three times a day  trimethoprim   80 mG/sulfamethoxazole 400 mG 1 Tablet(s) Oral <User Schedule>  valGANciclovir 450 milliGRAM(s) Oral <User Schedule>    immunologic:  tacrolimus 3.5 milliGRAM(s) Oral <User Schedule>    OTHER:  acetaminophen     Tablet .. 650 milliGRAM(s) Oral every 6 hours PRN  apixaban 5 milliGRAM(s) Oral every 12 hours  aspirin  chewable 81 milliGRAM(s) Oral daily  atorvastatin 10 milliGRAM(s) Oral at bedtime  buMETAnide 1 milliGRAM(s) Oral daily  dextrose 5%. 1000 milliLiter(s) IV Continuous <Continuous>  dextrose 5%. 1000 milliLiter(s) IV Continuous <Continuous>  dextrose 50% Injectable 12.5 Gram(s) IV Push once  dextrose 50% Injectable 25 Gram(s) IV Push once  dextrose 50% Injectable 25 Gram(s) IV Push once  dextrose Oral Gel 15 Gram(s) Oral once PRN  glucagon  Injectable 1 milliGRAM(s) IntraMuscular once  insulin lispro (ADMELOG) corrective regimen sliding scale   SubCutaneous three times a day before meals  insulin lispro (ADMELOG) corrective regimen sliding scale   SubCutaneous at bedtime  latanoprost 0.005% Ophthalmic Solution 1 Drop(s) Both EYES at bedtime  melatonin 3 milliGRAM(s) Oral at bedtime PRN  metoprolol succinate  milliGRAM(s) Oral daily  pantoprazole    Tablet 40 milliGRAM(s) Oral before breakfast  predniSONE   Tablet 10 milliGRAM(s) Oral three times a day  sodium zirconium cyclosilicate 10 Gram(s) Oral <User Schedule>      Objective:  Vital Signs Last 24 Hrs  T(C): 36.7 (17 Jul 2023 12:05), Max: 36.7 (17 Jul 2023 12:05)  T(F): 98.1 (17 Jul 2023 12:05), Max: 98.1 (17 Jul 2023 12:05)  HR: 114 (17 Jul 2023 12:05) (113 - 114)  BP: 132/85 (17 Jul 2023 12:05) (132/85 - 138/89)  BP(mean): --  RR: 18 (17 Jul 2023 12:05) (18 - 18)  SpO2: 96% (17 Jul 2023 12:05) (96% - 99%)    Parameters below as of 17 Jul 2023 12:05  Patient On (Oxygen Delivery Method): room air        PHYSICAL EXAM:  Constitutional:no acute distress  Eyes:WALT, EOMI  Ear/Nose/Throat: no oral lesions, 	  Respiratory: clear BL  Cardiovascular: S1S2  Gastrointestinal:soft, (+) BS, no tenderness  Extremities:no e/e/c  No Lymphadenopathy  IV sites not inflammed.    LABS:                        10.3   17.43 )-----------( 312      ( 17 Jul 2023 10:17 )             34.9     07-17    139  |  100  |  47<H>  ----------------------------<  185<H>  5.1   |  24  |  1.44<H>    Ca    9.2      17 Jul 2023 10:19  Phos  2.9     07-17  Mg     1.9     07-17    TPro  6.7  /  Alb  4.2  /  TBili  0.4  /  DBili  x   /  AST  21  /  ALT  11  /  AlkPhos  71  07-17      Urinalysis Basic - ( 17 Jul 2023 10:19 )    Color: x / Appearance: x / SG: x / pH: x  Gluc: 185 mg/dL / Ketone: x  / Bili: x / Urobili: x   Blood: x / Protein: x / Nitrite: x   Leuk Esterase: x / RBC: x / WBC x   Sq Epi: x / Non Sq Epi: x / Bacteria: x        MICROBIOLOGY:      Legionella Antigen, Urine: Negative: Positive Testing method: Immunochromatographic Assay.  Presumptive detection of L. pneumophila serogroup 1 antigen in urine,  suggesting recent or current infection. Order "Culture -Legionella" as  recommended to confirm infection.  Negative Testing method: Immunochromatographic Assay.  L. pneumophila serogroup 1 antigen in urine NOT detected, suggesting NO  recent or current infection. Infection due to Legionella cannot be ruled  out: other serogroups and species may cause disease, antigen may not be  present in urine in early infection, or the level of antigens in urine  may be below the detection limit of the test.  Order "Culture  -Legionella" is recommended for uncommon cases of suspected Legionella  pneumonia due to organisms other thanL. pneumophila serogroup 1. (07.15.23 @ 07:24)          RECENT CULTURES:  07-11 @ 10:32  .Blood Blood-Peripheral  Blood Culture PCR  Blood Culture PCR  PCR    Growth in aerobic bottle: Staphylococcus saprophyticus  Coagulase Negative Staphylococci isolated from a single blood culture set  may represent contamination.  Contact the Microbiology Department at [phone] if susceptibility  testing is clinically indicated.  Direct identification is available within approximately 3-5  hours either by Blood Panel Multiplexed PCR or Direct  MALDI-TOF. Details: [email or web address]  --  07-11 @ 10:29  .Blood Blood-Peripheral  --  --  --    No growth at 5 days  --  07-10 @ 17:19  Clean Catch Clean Catch (Midstream)  --  --  --    <10,000 CFU/mL Normal Urogenital [place]  --      RADIOLOGY & ADDITIONAL STUDIES:    < from: CT Chest w/ IV Cont (07.11.23 @ 08:57) >  IMPRESSION:  *  Elevation of the right hemidiaphragm with right lower lobe   consolidation consistent with pneumonia and/or atelectasis, similar to   findings present on prior exam.  *  No evidence of acute abnormality in the abdomen and pelvis.    < end of copied text >

## 2023-07-18 ENCOUNTER — APPOINTMENT (OUTPATIENT)
Dept: HEART FAILURE | Facility: CLINIC | Age: 73
End: 2023-07-18

## 2023-07-18 VITALS
TEMPERATURE: 98 F | HEART RATE: 98 BPM | OXYGEN SATURATION: 98 % | SYSTOLIC BLOOD PRESSURE: 128 MMHG | DIASTOLIC BLOOD PRESSURE: 88 MMHG | RESPIRATION RATE: 18 BRPM

## 2023-07-18 LAB
GLUCOSE BLDC GLUCOMTR-MCNC: 229 MG/DL — HIGH (ref 70–99)
GLUCOSE BLDC GLUCOMTR-MCNC: 249 MG/DL — HIGH (ref 70–99)
TACROLIMUS SERPL-MCNC: 9 NG/ML — SIGNIFICANT CHANGE UP

## 2023-07-18 PROCEDURE — 36415 COLL VENOUS BLD VENIPUNCTURE: CPT

## 2023-07-18 PROCEDURE — 82570 ASSAY OF URINE CREATININE: CPT

## 2023-07-18 PROCEDURE — 99285 EMERGENCY DEPT VISIT HI MDM: CPT | Mod: 25

## 2023-07-18 PROCEDURE — 85610 PROTHROMBIN TIME: CPT

## 2023-07-18 PROCEDURE — 82248 BILIRUBIN DIRECT: CPT

## 2023-07-18 PROCEDURE — 82962 GLUCOSE BLOOD TEST: CPT

## 2023-07-18 PROCEDURE — 82803 BLOOD GASES ANY COMBINATION: CPT

## 2023-07-18 PROCEDURE — 0225U NFCT DS DNA&RNA 21 SARSCOV2: CPT

## 2023-07-18 PROCEDURE — 84295 ASSAY OF SERUM SODIUM: CPT

## 2023-07-18 PROCEDURE — 94640 AIRWAY INHALATION TREATMENT: CPT

## 2023-07-18 PROCEDURE — 97116 GAIT TRAINING THERAPY: CPT

## 2023-07-18 PROCEDURE — 83615 LACTATE (LD) (LDH) ENZYME: CPT

## 2023-07-18 PROCEDURE — 87305 ASPERGILLUS AG IA: CPT

## 2023-07-18 PROCEDURE — 82330 ASSAY OF CALCIUM: CPT

## 2023-07-18 PROCEDURE — 71260 CT THORAX DX C+: CPT

## 2023-07-18 PROCEDURE — 84145 PROCALCITONIN (PCT): CPT

## 2023-07-18 PROCEDURE — 85025 COMPLETE CBC W/AUTO DIFF WBC: CPT

## 2023-07-18 PROCEDURE — 82247 BILIRUBIN TOTAL: CPT

## 2023-07-18 PROCEDURE — 80197 ASSAY OF TACROLIMUS: CPT

## 2023-07-18 PROCEDURE — 81003 URINALYSIS AUTO W/O SCOPE: CPT

## 2023-07-18 PROCEDURE — 74177 CT ABD & PELVIS W/CONTRAST: CPT

## 2023-07-18 PROCEDURE — 93356 MYOCRD STRAIN IMG SPCKL TRCK: CPT

## 2023-07-18 PROCEDURE — 93321 DOPPLER ECHO F-UP/LMTD STD: CPT

## 2023-07-18 PROCEDURE — 85045 AUTOMATED RETICULOCYTE COUNT: CPT

## 2023-07-18 PROCEDURE — 85730 THROMBOPLASTIN TIME PARTIAL: CPT

## 2023-07-18 PROCEDURE — 93005 ELECTROCARDIOGRAM TRACING: CPT

## 2023-07-18 PROCEDURE — 84132 ASSAY OF SERUM POTASSIUM: CPT

## 2023-07-18 PROCEDURE — 87040 BLOOD CULTURE FOR BACTERIA: CPT

## 2023-07-18 PROCEDURE — 81001 URINALYSIS AUTO W/SCOPE: CPT

## 2023-07-18 PROCEDURE — 87449 NOS EACH ORGANISM AG IA: CPT

## 2023-07-18 PROCEDURE — 99233 SBSQ HOSP IP/OBS HIGH 50: CPT | Mod: GC

## 2023-07-18 PROCEDURE — 93308 TTE F-UP OR LMTD: CPT

## 2023-07-18 PROCEDURE — 84300 ASSAY OF URINE SODIUM: CPT

## 2023-07-18 PROCEDURE — 84540 ASSAY OF URINE/UREA-N: CPT

## 2023-07-18 PROCEDURE — 83735 ASSAY OF MAGNESIUM: CPT

## 2023-07-18 PROCEDURE — 83880 ASSAY OF NATRIURETIC PEPTIDE: CPT

## 2023-07-18 PROCEDURE — 86480 TB TEST CELL IMMUN MEASURE: CPT

## 2023-07-18 PROCEDURE — 85018 HEMOGLOBIN: CPT

## 2023-07-18 PROCEDURE — 80048 BASIC METABOLIC PNL TOTAL CA: CPT

## 2023-07-18 PROCEDURE — 82947 ASSAY GLUCOSE BLOOD QUANT: CPT

## 2023-07-18 PROCEDURE — 84100 ASSAY OF PHOSPHORUS: CPT

## 2023-07-18 PROCEDURE — 82435 ASSAY OF BLOOD CHLORIDE: CPT

## 2023-07-18 PROCEDURE — 85014 HEMATOCRIT: CPT

## 2023-07-18 PROCEDURE — 83605 ASSAY OF LACTIC ACID: CPT

## 2023-07-18 PROCEDURE — 87077 CULTURE AEROBIC IDENTIFY: CPT

## 2023-07-18 PROCEDURE — 97161 PT EVAL LOW COMPLEX 20 MIN: CPT

## 2023-07-18 PROCEDURE — 85027 COMPLETE CBC AUTOMATED: CPT

## 2023-07-18 PROCEDURE — 71046 X-RAY EXAM CHEST 2 VIEWS: CPT

## 2023-07-18 PROCEDURE — 80053 COMPREHEN METABOLIC PANEL: CPT

## 2023-07-18 PROCEDURE — 87086 URINE CULTURE/COLONY COUNT: CPT

## 2023-07-18 PROCEDURE — 83010 ASSAY OF HAPTOGLOBIN QUANT: CPT

## 2023-07-18 PROCEDURE — 85652 RBC SED RATE AUTOMATED: CPT

## 2023-07-18 PROCEDURE — 84484 ASSAY OF TROPONIN QUANT: CPT

## 2023-07-18 PROCEDURE — 86140 C-REACTIVE PROTEIN: CPT

## 2023-07-18 PROCEDURE — 87150 DNA/RNA AMPLIFIED PROBE: CPT

## 2023-07-18 RX ORDER — INSULIN LISPRO 100/ML
2 VIAL (ML) SUBCUTANEOUS
Refills: 0 | Status: DISCONTINUED | OUTPATIENT
Start: 2023-07-18 | End: 2023-07-18

## 2023-07-18 RX ORDER — INSULIN GLARGINE 100 [IU]/ML
8 INJECTION, SOLUTION SUBCUTANEOUS AT BEDTIME
Refills: 0 | Status: DISCONTINUED | OUTPATIENT
Start: 2023-07-18 | End: 2023-07-18

## 2023-07-18 RX ORDER — INSULIN LISPRO 100/ML
7 VIAL (ML) SUBCUTANEOUS
Refills: 0 | Status: DISCONTINUED | OUTPATIENT
Start: 2023-07-18 | End: 2023-07-18

## 2023-07-18 RX ADMIN — Medication 2: at 12:27

## 2023-07-18 RX ADMIN — APIXABAN 5 MILLIGRAM(S): 2.5 TABLET, FILM COATED ORAL at 05:12

## 2023-07-18 RX ADMIN — PANTOPRAZOLE SODIUM 40 MILLIGRAM(S): 20 TABLET, DELAYED RELEASE ORAL at 05:13

## 2023-07-18 RX ADMIN — Medication 2: at 08:12

## 2023-07-18 RX ADMIN — Medication 30 MILLIGRAM(S): at 05:12

## 2023-07-18 RX ADMIN — Medication 2 UNIT(S): at 12:27

## 2023-07-18 RX ADMIN — TACROLIMUS 3.5 MILLIGRAM(S): 5 CAPSULE ORAL at 08:12

## 2023-07-18 RX ADMIN — Medication 500000 UNIT(S): at 13:28

## 2023-07-18 RX ADMIN — Medication 81 MILLIGRAM(S): at 12:27

## 2023-07-18 RX ADMIN — BUMETANIDE 1 MILLIGRAM(S): 0.25 INJECTION INTRAMUSCULAR; INTRAVENOUS at 05:12

## 2023-07-18 RX ADMIN — Medication 100 MILLIGRAM(S): at 05:12

## 2023-07-18 RX ADMIN — Medication 500000 UNIT(S): at 05:11

## 2023-07-18 NOTE — PROGRESS NOTE ADULT - PROBLEM SELECTOR PROBLEM 4
Chronic kidney disease (CKD)

## 2023-07-18 NOTE — PROGRESS NOTE ADULT - ASSESSMENT
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

## 2023-07-18 NOTE — PROGRESS NOTE ADULT - PROBLEM SELECTOR PROBLEM 1
Systemic inflammatory response syndrome (SIRS)
Transplanted heart
Transplanted heart
Systemic inflammatory response syndrome (SIRS)
Transplanted heart
Transplanted heart
Systemic inflammatory response syndrome (SIRS)
Transplanted heart
Transplanted heart
Systemic inflammatory response syndrome (SIRS)
Transplanted heart
Transplanted heart
Systemic inflammatory response syndrome (SIRS)
Systemic inflammatory response syndrome (SIRS)

## 2023-07-18 NOTE — PROGRESS NOTE ADULT - REASON FOR ADMISSION
sepsis w/u

## 2023-07-18 NOTE — PROGRESS NOTE ADULT - PROBLEM SELECTOR PROBLEM 3
Receiving intravenous antibiotic treatment at home
Nicole syndrome
Receiving intravenous antibiotic treatment at home
Nicole syndrome

## 2023-07-18 NOTE — PROGRESS NOTE ADULT - PROBLEM SELECTOR PLAN 9
Diet: DASH with CC   Dispo: Plan for d/c 7/18  DVT: On Eliquis  Code: FULL Diet: DASH with CC   Dispo: D/c today (7/18)  DVT: On Eliquis  Code: FULL

## 2023-07-18 NOTE — PROGRESS NOTE ADULT - PROBLEM SELECTOR PLAN 1
Pt w/ leukocytosis and tachycardia on admission, started on IV meropenem on 7/8 at rehab for elevated WBC. No clear source of infx identified. CXR with R basilar atelectasis, CT chest & abdomen with RLL consolidation and R hemidiaphragm elevation, although this seems to be chronic when comparing to prior films. CXR showing R basilar atelectasis. UA unremarkable. RVP negative. Procal 0.11. WBC now downtrending, stable vitals throughout admission, no source of infection identified. Now s/p meropenem course, last dose 7/14. WBC persistently elevated throughout admission.  - ID following, appreciate recs  - trend WBC  - legionella urine ag negative  - serum fungitell, galactomannan negative  - CT chest with RLL consolidation, appears to be chronic when compared with prior scans. Pulmonology consulted, not offering bronchoscopy/BAL due to chronicity of atelectasis, unlikely active infection  - 1/2 blood culture sets growing coagulase negative staph, likely contamination. Repeat blood cultures drawn 7/16, pending Pt w/ leukocytosis and tachycardia on admission, started on IV meropenem on 7/8 at rehab for elevated WBC. No clear source of infx identified. CXR with R basilar atelectasis, CT chest & abdomen with RLL consolidation and R hemidiaphragm elevation, although this seems to be chronic when comparing to prior films. CXR showing R basilar atelectasis. UA unremarkable. RVP negative. Procal 0.11. WBC now downtrending, stable vitals throughout admission, no source of infection identified. Now s/p meropenem course, last dose 7/14. WBC persistently elevated throughout admission.  - ID following, appreciate recs  - trend WBC  - legionella urine ag negative  - serum fungitell, galactomannan negative  - CT chest with RLL consolidation, appears to be chronic when compared with prior scans. Pulmonology consulted, not offering bronchoscopy/BAL due to chronicity of atelectasis, unlikely active infection  - 1/2 blood culture sets growing coagulase negative staph, likely contamination. Repeat blood cultures drawn 7/16 with no growth to date.

## 2023-07-18 NOTE — PROGRESS NOTE ADULT - PROBLEM SELECTOR PLAN 3
Follows with Dr. Rosario. As an outpatient, discussed possibly slow wean prednisone.   - hematology following, appreciate recs  - will trend hemolysis markers daily: LDH, reticulocyte count, CMP, phos, fractionated direct and indirect bilirubin, haptoglobin  - Hb stable this admission at ~9  - c/w Prednisone 30mg QD  - continue protonix for GI protection on prednisone  - continue bactrim for PJP ppx - increased dose to bactrim DS TID (dose appears to have been reduced on prior admission for renal function, will monitor for hyperkalemia at increased dose), re-decreased back to SS TID by transplant team Follows with Dr. Rosario. As an outpatient, discussed possibly slow wean prednisone.   - hematology following, appreciate recs  - will trend hemolysis markers daily: LDH, reticulocyte count, CMP, phos, fractionated direct and indirect bilirubin, haptoglobin  - Hb stable this admission at ~9  - c/w Prednisone 30mg QD  - continue protonix for GI protection on prednisone  - continue bactrim for PJP ppx

## 2023-07-18 NOTE — PROGRESS NOTE ADULT - PROBLEM SELECTOR PLAN 5
Pt previously on admelog 7u tid and glargine 8u at bedtime, however outpatient med rec reveals pt not recently on any insulin  - not on any standing insulin this admission, CBGs at goal  - Per endocrine they recommended Lantus 8 and Admelog 7 before breakfast and 2 before lunch with prednisone 30 mg daily Pt previously on admelog 7u tid and glargine 8u at bedtime, however outpatient med rec reveals pt not recently on any insulin  - not on any standing insulin this admission, CBGs at goal  - Per endocrine they recommended Lantus 8, Admelog 7 before breakfast and 2 before lunch with prednisone 30 mg daily

## 2023-07-18 NOTE — PROGRESS NOTE ADULT - SUBJECTIVE AND OBJECTIVE BOX
DATE OF SERVICE: 07-18-23 @ 09:13    Patient is a 73y old  Male who presents with a chief complaint of sepsis w/u (17 Jul 2023 15:55)      SUBJECTIVE / OVERNIGHT EVENTS:    MEDICATIONS  (STANDING):  apixaban 5 milliGRAM(s) Oral every 12 hours  aspirin  chewable 81 milliGRAM(s) Oral daily  atorvastatin 10 milliGRAM(s) Oral at bedtime  buMETAnide 1 milliGRAM(s) Oral daily  dextrose 5%. 1000 milliLiter(s) (50 mL/Hr) IV Continuous <Continuous>  dextrose 5%. 1000 milliLiter(s) (100 mL/Hr) IV Continuous <Continuous>  dextrose 50% Injectable 12.5 Gram(s) IV Push once  dextrose 50% Injectable 25 Gram(s) IV Push once  dextrose 50% Injectable 25 Gram(s) IV Push once  glucagon  Injectable 1 milliGRAM(s) IntraMuscular once  insulin lispro (ADMELOG) corrective regimen sliding scale   SubCutaneous at bedtime  insulin lispro (ADMELOG) corrective regimen sliding scale   SubCutaneous three times a day before meals  latanoprost 0.005% Ophthalmic Solution 1 Drop(s) Both EYES at bedtime  metoprolol succinate  milliGRAM(s) Oral daily  nystatin    Suspension [id number] Unit(s) Oral three times a day  pantoprazole    Tablet 40 milliGRAM(s) Oral before breakfast  predniSONE   Tablet 30 milliGRAM(s) Oral daily  sodium zirconium cyclosilicate 10 Gram(s) Oral <User Schedule>  tacrolimus 3.5 milliGRAM(s) Oral <User Schedule>  trimethoprim   80 mG/sulfamethoxazole 400 mG 1 Tablet(s) Oral <User Schedule>  valGANciclovir 450 milliGRAM(s) Oral <User Schedule>    MEDICATIONS  (PRN):  acetaminophen     Tablet .. 650 milliGRAM(s) Oral every 6 hours PRN Temp greater or equal to 38C (100.4F), Mild Pain (1 - 3)  dextrose Oral Gel 15 Gram(s) Oral once PRN Blood Glucose LESS THAN 70 milliGRAM(s)/deciliter  melatonin 3 milliGRAM(s) Oral at bedtime PRN Insomnia      Vital Signs Last 24 Hrs  T(C): 36.6 (18 Jul 2023 08:50), Max: 36.7 (17 Jul 2023 12:05)  T(F): 97.8 (18 Jul 2023 08:50), Max: 98.1 (17 Jul 2023 12:05)  HR: 98 (18 Jul 2023 08:50) (98 - 114)  BP: 128/88 (18 Jul 2023 08:50) (125/85 - 132/85)  BP(mean): --  RR: 18 (18 Jul 2023 08:50) (18 - 18)  SpO2: 98% (18 Jul 2023 08:50) (96% - 100%)    Parameters below as of 18 Jul 2023 08:50  Patient On (Oxygen Delivery Method): room air      CAPILLARY BLOOD GLUCOSE      POCT Blood Glucose.: 249 mg/dL (18 Jul 2023 08:09)  POCT Blood Glucose.: 222 mg/dL (17 Jul 2023 22:15)  POCT Blood Glucose.: 134 mg/dL (17 Jul 2023 16:23)  POCT Blood Glucose.: 204 mg/dL (17 Jul 2023 11:32)    I&O's Summary    17 Jul 2023 07:01  -  18 Jul 2023 07:00  --------------------------------------------------------  IN: 480 mL / OUT: 1900 mL / NET: -1420 mL        PHYSICAL EXAM:  GENERAL: NAD, well-developed  HEAD:  Atraumatic, Normocephalic  EYES: EOMI, PERRLA, conjunctiva and sclera clear  NECK: Supple, No JVD  CHEST/LUNG: Clear to auscultation bilaterally; No wheeze  HEART: Regular rate and rhythm; No murmurs, rubs, or gallops  ABDOMEN: Soft, Nontender, Nondistended; Bowel sounds present  EXTREMITIES:  2+ Peripheral Pulses, No clubbing, cyanosis, or edema  PSYCH: AAOx3  NEUROLOGY: non-focal  SKIN: No rashes or lesions    LABS:                        10.3   17.43 )-----------( 312      ( 17 Jul 2023 10:17 )             34.9     07-17    139  |  100  |  47<H>  ----------------------------<  185<H>  5.1   |  24  |  1.44<H>    Ca    9.2      17 Jul 2023 10:19  Phos  2.9     07-17  Mg     1.9     07-17    TPro  6.7  /  Alb  4.2  /  TBili  0.4  /  DBili  x   /  AST  21  /  ALT  11  /  AlkPhos  71  07-17          Urinalysis Basic - ( 17 Jul 2023 10:19 )    Color: x / Appearance: x / SG: x / pH: x  Gluc: 185 mg/dL / Ketone: x  / Bili: x / Urobili: x   Blood: x / Protein: x / Nitrite: x   Leuk Esterase: x / RBC: x / WBC x   Sq Epi: x / Non Sq Epi: x / Bacteria: x        RADIOLOGY & ADDITIONAL TESTS:    Imaging Personally Reviewed:    Consultant(s) Notes Reviewed:      Care Discussed with Consultants/Other Providers:   DATE OF SERVICE: 07-18-23 @ 09:13    Patient is a 73y old  Male who presents with a chief complaint of sepsis w/u (17 Jul 2023 15:55)      SUBJECTIVE / OVERNIGHT EVENTS: No acute events overnight. Patient denies any fever, chills, chest pain, abdominal pain, headache. Patient to be discharged today.     MEDICATIONS  (STANDING):  apixaban 5 milliGRAM(s) Oral every 12 hours  aspirin  chewable 81 milliGRAM(s) Oral daily  atorvastatin 10 milliGRAM(s) Oral at bedtime  buMETAnide 1 milliGRAM(s) Oral daily  dextrose 5%. 1000 milliLiter(s) (50 mL/Hr) IV Continuous <Continuous>  dextrose 5%. 1000 milliLiter(s) (100 mL/Hr) IV Continuous <Continuous>  dextrose 50% Injectable 12.5 Gram(s) IV Push once  dextrose 50% Injectable 25 Gram(s) IV Push once  dextrose 50% Injectable 25 Gram(s) IV Push once  glucagon  Injectable 1 milliGRAM(s) IntraMuscular once  insulin lispro (ADMELOG) corrective regimen sliding scale   SubCutaneous at bedtime  insulin lispro (ADMELOG) corrective regimen sliding scale   SubCutaneous three times a day before meals  latanoprost 0.005% Ophthalmic Solution 1 Drop(s) Both EYES at bedtime  metoprolol succinate  milliGRAM(s) Oral daily  nystatin    Suspension [id number] Unit(s) Oral three times a day  pantoprazole    Tablet 40 milliGRAM(s) Oral before breakfast  predniSONE   Tablet 30 milliGRAM(s) Oral daily  sodium zirconium cyclosilicate 10 Gram(s) Oral <User Schedule>  tacrolimus 3.5 milliGRAM(s) Oral <User Schedule>  trimethoprim   80 mG/sulfamethoxazole 400 mG 1 Tablet(s) Oral <User Schedule>  valGANciclovir 450 milliGRAM(s) Oral <User Schedule>    MEDICATIONS  (PRN):  acetaminophen     Tablet .. 650 milliGRAM(s) Oral every 6 hours PRN Temp greater or equal to 38C (100.4F), Mild Pain (1 - 3)  dextrose Oral Gel 15 Gram(s) Oral once PRN Blood Glucose LESS THAN 70 milliGRAM(s)/deciliter  melatonin 3 milliGRAM(s) Oral at bedtime PRN Insomnia      Vital Signs Last 24 Hrs  T(C): 36.6 (18 Jul 2023 08:50), Max: 36.7 (17 Jul 2023 12:05)  T(F): 97.8 (18 Jul 2023 08:50), Max: 98.1 (17 Jul 2023 12:05)  HR: 98 (18 Jul 2023 08:50) (98 - 114)  BP: 128/88 (18 Jul 2023 08:50) (125/85 - 132/85)  BP(mean): --  RR: 18 (18 Jul 2023 08:50) (18 - 18)  SpO2: 98% (18 Jul 2023 08:50) (96% - 100%)    Parameters below as of 18 Jul 2023 08:50  Patient On (Oxygen Delivery Method): room air      CAPILLARY BLOOD GLUCOSE      POCT Blood Glucose.: 249 mg/dL (18 Jul 2023 08:09)  POCT Blood Glucose.: 222 mg/dL (17 Jul 2023 22:15)  POCT Blood Glucose.: 134 mg/dL (17 Jul 2023 16:23)  POCT Blood Glucose.: 204 mg/dL (17 Jul 2023 11:32)    I&O's Summary    17 Jul 2023 07:01  -  18 Jul 2023 07:00  --------------------------------------------------------  IN: 480 mL / OUT: 1900 mL / NET: -1420 mL        PHYSICAL EXAM:  GENERAL: NAD, well-developed  HEAD:  Atraumatic, Normocephalic  EYES: EOMI, PERRLA, conjunctiva and sclera clear  NECK: Supple, No JVD  CHEST/LUNG: Clear to auscultation bilaterally; No wheeze  HEART: Blowing murmur at left sternal border   ABDOMEN: Soft, Nontender, Nondistended; Bowel sounds present  EXTREMITIES:  2+ Peripheral Pulses, No LE edema    LABS:                        10.3   17.43 )-----------( 312      ( 17 Jul 2023 10:17 )             34.9     07-17    139  |  100  |  47<H>  ----------------------------<  185<H>  5.1   |  24  |  1.44<H>    Ca    9.2      17 Jul 2023 10:19  Phos  2.9     07-17  Mg     1.9     07-17    TPro  6.7  /  Alb  4.2  /  TBili  0.4  /  DBili  x   /  AST  21  /  ALT  11  /  AlkPhos  71  07-17          Urinalysis Basic - ( 17 Jul 2023 10:19 )    Color: x / Appearance: x / SG: x / pH: x  Gluc: 185 mg/dL / Ketone: x  / Bili: x / Urobili: x   Blood: x / Protein: x / Nitrite: x   Leuk Esterase: x / RBC: x / WBC x   Sq Epi: x / Non Sq Epi: x / Bacteria: x        RADIOLOGY & ADDITIONAL TESTS:    Imaging Personally Reviewed:    Consultant(s) Notes Reviewed:      Care Discussed with Consultants/Other Providers:

## 2023-07-18 NOTE — PROGRESS NOTE ADULT - PROBLEM SELECTOR PROBLEM 2
Nicole syndrome
Transplanted heart
Nicole syndrome
Nicole syndrome
Transplanted heart

## 2023-07-25 ENCOUNTER — APPOINTMENT (OUTPATIENT)
Dept: HEART FAILURE | Facility: CLINIC | Age: 73
End: 2023-07-25

## 2023-07-25 ENCOUNTER — APPOINTMENT (OUTPATIENT)
Dept: CV DIAGNOSITCS | Facility: HOSPITAL | Age: 73
End: 2023-07-25

## 2023-07-25 ENCOUNTER — NON-APPOINTMENT (OUTPATIENT)
Age: 73
End: 2023-07-25

## 2023-07-25 ENCOUNTER — APPOINTMENT (OUTPATIENT)
Dept: HEART FAILURE | Facility: CLINIC | Age: 73
End: 2023-07-25
Payer: MEDICARE

## 2023-07-25 VITALS
DIASTOLIC BLOOD PRESSURE: 86 MMHG | WEIGHT: 174 LBS | HEIGHT: 66 IN | OXYGEN SATURATION: 97 % | BODY MASS INDEX: 27.97 KG/M2 | TEMPERATURE: 97.5 F | HEART RATE: 107 BPM | SYSTOLIC BLOOD PRESSURE: 133 MMHG

## 2023-07-25 PROCEDURE — 93000 ELECTROCARDIOGRAM COMPLETE: CPT

## 2023-07-25 PROCEDURE — 99215 OFFICE O/P EST HI 40 MIN: CPT | Mod: 25

## 2023-07-25 NOTE — DISCUSSION/SUMMARY
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 \par hemolytic anemia/recent thrombocytopenia. Admission in feb 2023 for recurrent hem anemia. d/c on pred 75mg. Follow by Dr Rosario. Current dose of pred 30. Plans are for initiation of ritoxan because of persistent anemia (not yet started). \par intolerant of cellcept due leukopenia. 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 everolimus d/c for that reason. \par Annual Feb 2023: no obstructive CAD. 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 \par Last Bx Feb 2022 OR.  C4D 50%. +1 staining.No histopath features of AMR. No   DSAs. \par last allosure 5.26: < .12 \par Admitted: March 3-18. viral pneumonia. metapneumovirus. Afib flutter. self converted. \par Admission May 23-6.2 Recurrent falls and volume overload. Diuresis. Mild LV dysfunction. No biopsy due to stable allosure. \par Admission June 14-23: Found at home semi-concious. Hypoglycemia. Insulin adjusted. d/c nursing home pending resolution of adequate home supports. \par Admitted July 10-17 from my clinic: he had come from nursing home from which we had no information. He was being treated meropenem. \par Pancutlured, Panscanned. Completed dayne. no source. WBC elevated on admission and d/c \par Home for one week with greater support. \par  prograf 3.5 bid (5.6 July 17, goal is 4-6)  , off cellcept , pred 30, eloquis 5 bid, asa nystatin, bactrim TIW, valcyte 450 biw, statin, toprol 100 , bumex 1 QD, insulin \par home BPs 130/-140/. BS controlled. \par feeling well. only ambulates around the home. \par 133/86, 107 97 174 (180) 97%. RA. \par appears euvolemic \par July 17: WBC 17, Hb 10, Plt 312. Na 139, K 5.1, Bi 24, BUN 47, Cr 1.4. Mg 1.9. \par Multiple medical issues:\par Plan: \par make bumex 1 alt .5 and follow weights. \par make toprol 150 then 200. \par then Losartan 25 QHS. \par Needs to be scheduled for ritoxan. \par NP visit 3 weeks. \par See me 6 weeks. \par allosure every 3 mths \par Marvin Campbell \par 50 mins

## 2023-07-25 NOTE — CARDIOLOGY SUMMARY
[de-identified] : \par EKG 4/3/23: NSR 91bpm +RBBB\par EKG 12/28/22: NSR 95 bpm +RBBB [de-identified] : DSE 12/22/2020: Conclusions:\par 1. Normal hemodynamic response.\par 2. Normal electrocardiographic response.\par 3. Overall preserved left ventricular systolic function\par with mild hypokinesis of the mid to distal anterior wall at\par baseline. Normal augmentation in left ventricular systolic\par function with dobutamine infusion.\par 4. No evidence of inducible ischemia on stress\par echocardiogram images.\par *** Compared with echocardiogram of 12/2/2020, overall\par preserved left ventricular systolic function at baseline\par with mild hypokiesis of the mid to distal anterior wall.\par Normal augmentation in left ventricular systolic function\par with dobutamine infusion.\par \par 2/20/20: DSE EF 50-55% no evidence of inducible ischemia on pharmacologic stress echo images [de-identified] : \par TTE: 7/12/23- LVIDd 4.3, LVEF 55%, septal motion abnormal, grossly mild-mod enlarged, no reported valvulopathy\par TTE: 6/16/23: CONCLUSIONS:\par  1. Normal left ventricular cavity size. The left ventricular wall thickness is normal. The left ventricular systolic function is normal with an ejection fraction of 56 % by Sherman's method of disks. There are no regional wall motion abnormalities seen.\par  2. Mildly enlarged right ventricular cavity size, normal wall thickness and probably normal systolic function. The tricuspid annular plane systolic excursion (TAPSE) is 1.0 cm (normal >=1.7 cm).\par  3. Mild mitral regurgitation.\par  4. Compared to the transthoracic echocardiogram performed on 5/19/2023 there have been no significant interval changes.\par TTE 3/3/23: LVIDd 3.6, EF 50-55%, concentric remodeling \par TTE 7/25/22: EF 60%, LVIDD 4.5cm, normal LV function, normal RV size with decreased RV function, mild TR, no pericardial effusion\par \par TTE 2/28/22: EF 65% LVIDD not calculated. normal biV function. 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 minimal TR. no pericardial effusion. [de-identified] : \par CT chest/abd/pelvis 3/14/23: IMPRESSION:\par Chest: Improving aeration of previously impacted right lower lobe distal  airways with improving bibasilar atelectasis. Additionally, there is a \par small clustered area of unchanged nodular opacities which may represent \par superimposed infection or inflammation. 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  No additional coronary-cameral fistulae noted. 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 IVUS performed of the RCA\par which was normal. Normal RHC\par \par 12/2/2020: RHC/Biopsy ISHLT Grade 0R\par 11/18/2020:  L/RHC w/ IVUS:\par CORONARY VESSELS: The coronary circulation is right dominant.\par LM:   --  LM: The vessel was very large sized. Angiography showed\par aneurysmal dilatation.\par LAD:   --  Proximal LAD: The vessel was very large sized. Angiography\par showed aneurysmal dilatation.\par --  Mid LAD: The vessel was very large sized and excessively ectatic. A\par fistula to the left ventricle was identified.\par --  Distal LAD: Normal. 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 No significant intimal thickening/hyperplasia or\par plaque was observed. [de-identified] : 3/15/23: b/l LE duplex\par IMPRESSION: There is acute below the knee DVT affecting the right soleal and the left soleal and gastrocnemius veins.

## 2023-07-25 NOTE — PHYSICAL EXAM
[Well Developed] : well developed [Well Nourished] : well nourished [Normal] : normal S1, S2, no murmur, no rub, no gallop [Normal S1, S2] : normal S1, S2 [Soft] : abdomen soft [Non Tender] : non-tender [de-identified] : no jvd [de-identified] : +III/VI diastolic murmur, tachycardic [de-identified] : 2+ pitting b/l LE edema

## 2023-07-25 NOTE — HISTORY OF PRESENT ILLNESS
[FreeTextEntry1] : Mr. Baez is a 73 year old man with past medical history of a nonischemic cardiomyopathy and chronic systolic heart failure s/p HM2 LVAD in June 2017 complicated by recurrent GI hemorrhage and possible pump thrombosis who underwent heart transplant on 2/23/18 with a hepatitis C positive donor. His course was complicated by bilateral IJ/subclavian thrombi, a persistent small right sided pleural effusion, as well as acute on chronic renal failure of unclear etiology. In addition, his post-operative course was complicated by graft dysfunction of unclear etiology and persistent C4d positive staining on endomyocardial biopsy with negative DSAs. He developed joshua evidence of graft dysfunction leading to treatment with plasmapheresis, IVIG, and rituximab. Subsequently in June of 2018 he was found to have an pneumonia, that was ultimately diagnosed as Nocardia. This was successfully treated with therapy completed in August 2019.\par \par In the spring 2020, he was admitted with hemolytic anemia of unclear etiology. There were no clear precipitating factors, such as infection. He was treated with prednisone and Rituximab. Given the potential for CNI inhibitors leading to hemolytic anemia, we transitioned him to everolimus and he remained on high dose prednisone. He was subsequently admitted with acute anemia (not hemolytic). He developed severe leukopenia and Klebsiella bacteremia. Following treatment of this, he developed a severe C. diff colitis infection leading to a prolonged recovery. He was weaned to lower dose prednisone and the everolimus was transitioned back to tacrolimus. At the time of discharge he was found to have low levels of CMV viremia and was started back on valganciclovir. 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 Discharged home and had been following closely with heme/onc.\par \par Pt readmitted 3/3-3/18/23 who presented to [facility] ER on with new onset of chest tightness, found to be in Aflutter/fib. Hospital course c/b by URI w/ human metapneumovirus, normal LV function, acute on chronic kidney injury likely 2/2 hypovolemia, worsening leukocytosis with CT chest c/f PNA and hyperglycemia. Heme/onc consulted for hemolytic anemia management and prednisone dosing reduced to 60mg daily. Endocrine consulted for new onset of hyperglycemia and insulin teaching.  Bacterial infection-work up negative to date however had an elevated Fungitell.  Treated with broad spectrum abx from 3/3 to 3/8 (zosyn then cefepime).  Pt was cleared for discharge home on 3/18.\par \par Pt was re-admitted on who p/w on 6/14/23 recurrent hypoglycemia at home 2/2 poor PO intake and NORMAN SCr 3.1. Endocrine adjusted insulin regimen, pt was hydrated and SCr returned to baseline . Pt was discharged to rehab center on 6/23/23. Discharge weight 177lbs.\par \par Pt presented to clinic on 7/10 from [facility] and was on IV meropenem for unclear reason, thus pt was referred for admission to rule out infection. During hospitalization, due to leukocytosis, he was pancultured, CT chest/abd/pelvis completed, no signs or source of infection was found, but completed 7 day course of meropenem (end date 7/17). Pt was discharged home on 7/18/23.\par \par Pt arrives today for f/u post hospitalization follow up appt. He arrives with his aid; per pt he has an aide 7am -3pm 7 days/week and his brother is home with him in the afternoon/evenings. Pt reports feeling ok. Walks around the home and one block with his cane. B/L LE edema remains unchanged.   Aide is tracking VS and FS, weight stable 170-174 lbs, -207mg/dL, 's-140's/90's. Pt denied SOB, CP, palpitations, N/V/D, fever or chills.  Appetite good and sleeping fine.\par \par Health maintenance:\par DEXA 5/2019, 6/2021: osteopenia, 6/2022: worsening osteopenia. Followed by Dr. Clemens.\par CT Colon (4/2017) IMPRESSION: No colonic polyp or mass.\par PSA 6.76 (4/29/2022)\par HgA1c 4.7% (2/17/23) --> 7.3 % (5/18/23)

## 2023-07-26 LAB
ALBUMIN SERPL ELPH-MCNC: 4.3 G/DL
ALP BLD-CCNC: 52 U/L
ALT SERPL-CCNC: 16 U/L
ANION GAP SERPL CALC-SCNC: 16 MMOL/L
AST SERPL-CCNC: 14 U/L
BILIRUB SERPL-MCNC: 0.4 MG/DL
BUN SERPL-MCNC: 60 MG/DL
CALCIUM SERPL-MCNC: 9.7 MG/DL
CHLORIDE SERPL-SCNC: 108 MMOL/L
CO2 SERPL-SCNC: 20 MMOL/L
CREAT SERPL-MCNC: 2.06 MG/DL
EGFR: 33 ML/MIN/1.73M2
GLUCOSE SERPL-MCNC: 177 MG/DL
MAGNESIUM SERPL-MCNC: 1.8 MG/DL
POTASSIUM SERPL-SCNC: 5.3 MMOL/L
PROT SERPL-MCNC: 6.3 G/DL
SODIUM SERPL-SCNC: 144 MMOL/L

## 2023-07-27 ENCOUNTER — NON-APPOINTMENT (OUTPATIENT)
Age: 73
End: 2023-07-27

## 2023-07-30 ENCOUNTER — OUTPATIENT (OUTPATIENT)
Dept: OUTPATIENT SERVICES | Facility: HOSPITAL | Age: 73
LOS: 1 days | Discharge: HOME | End: 2023-07-30

## 2023-07-30 DIAGNOSIS — D64.9 ANEMIA, UNSPECIFIED: ICD-10-CM

## 2023-07-30 DIAGNOSIS — Z94.1 HEART TRANSPLANT STATUS: Chronic | ICD-10-CM

## 2023-08-01 ENCOUNTER — RESULT REVIEW (OUTPATIENT)
Age: 73
End: 2023-08-01

## 2023-08-01 ENCOUNTER — LABORATORY RESULT (OUTPATIENT)
Age: 73
End: 2023-08-01

## 2023-08-01 ENCOUNTER — APPOINTMENT (OUTPATIENT)
Dept: HEMATOLOGY ONCOLOGY | Facility: CLINIC | Age: 73
End: 2023-08-01
Payer: MEDICARE

## 2023-08-01 ENCOUNTER — NON-APPOINTMENT (OUTPATIENT)
Age: 73
End: 2023-08-01

## 2023-08-01 VITALS
TEMPERATURE: 98.2 F | BODY MASS INDEX: 27.6 KG/M2 | HEART RATE: 96 BPM | DIASTOLIC BLOOD PRESSURE: 85 MMHG | WEIGHT: 170.99 LBS | OXYGEN SATURATION: 98 % | SYSTOLIC BLOOD PRESSURE: 121 MMHG | RESPIRATION RATE: 18 BRPM

## 2023-08-01 LAB
ALBUMIN SERPL ELPH-MCNC: 4 G/DL
ALP BLD-CCNC: 48 U/L
ALT SERPL-CCNC: 16 U/L
ANION GAP SERPL CALC-SCNC: 14 MMOL/L
AST SERPL-CCNC: 18 U/L
BASOPHILS # BLD AUTO: 0.01 K/UL — SIGNIFICANT CHANGE UP (ref 0–0.2)
BASOPHILS NFR BLD AUTO: 0.1 % — SIGNIFICANT CHANGE UP (ref 0–2)
BILIRUB SERPL-MCNC: 0.6 MG/DL
BUN SERPL-MCNC: 45 MG/DL
CALCIUM SERPL-MCNC: 8.5 MG/DL
CHLORIDE SERPL-SCNC: 109 MMOL/L
CO2 SERPL-SCNC: 22 MMOL/L
CREAT SERPL-MCNC: 1.64 MG/DL
EGFR: 44 ML/MIN/1.73M2
EOSINOPHIL # BLD AUTO: 0.01 K/UL — SIGNIFICANT CHANGE UP (ref 0–0.5)
EOSINOPHIL NFR BLD AUTO: 0.1 % — SIGNIFICANT CHANGE UP (ref 0–6)
GLUCOSE SERPL-MCNC: 109 MG/DL
HCT VFR BLD CALC: 37 % — LOW (ref 39–50)
HGB BLD-MCNC: 11 G/DL — LOW (ref 13–17)
IMM GRANULOCYTES NFR BLD AUTO: 1.1 % — HIGH (ref 0–0.9)
LDH SERPL-CCNC: 291 U/L
LYMPHOCYTES # BLD AUTO: 1.5 K/UL — SIGNIFICANT CHANGE UP (ref 1–3.3)
LYMPHOCYTES # BLD AUTO: 12.2 % — LOW (ref 13–44)
MCHC RBC-ENTMCNC: 27.7 PG — SIGNIFICANT CHANGE UP (ref 27–34)
MCHC RBC-ENTMCNC: 29.7 G/DL — LOW (ref 32–36)
MCV RBC AUTO: 93.5 FL — SIGNIFICANT CHANGE UP (ref 80–100)
MONOCYTES # BLD AUTO: 0.83 K/UL — SIGNIFICANT CHANGE UP (ref 0–0.9)
MONOCYTES NFR BLD AUTO: 6.8 % — SIGNIFICANT CHANGE UP (ref 2–14)
NEUTROPHILS # BLD AUTO: 9.79 K/UL — HIGH (ref 1.8–7.4)
NEUTROPHILS NFR BLD AUTO: 79.7 % — HIGH (ref 43–77)
NRBC # BLD: 0 /100 WBCS — SIGNIFICANT CHANGE UP (ref 0–0)
PLATELET # BLD AUTO: 161 K/UL — SIGNIFICANT CHANGE UP (ref 150–400)
POTASSIUM SERPL-SCNC: 4 MMOL/L
PROT SERPL-MCNC: 6 G/DL
RBC # BLD: 3.97 M/UL — LOW (ref 4.2–5.8)
RBC # FLD: 20.1 % — HIGH (ref 10.3–14.5)
SODIUM SERPL-SCNC: 145 MMOL/L
WBC # BLD: 12.27 K/UL — HIGH (ref 3.8–10.5)
WBC # FLD AUTO: 12.27 K/UL — HIGH (ref 3.8–10.5)

## 2023-08-01 PROCEDURE — 99213 OFFICE O/P EST LOW 20 MIN: CPT

## 2023-08-01 RX ORDER — METOPROLOL SUCCINATE 50 MG/1
50 TABLET, EXTENDED RELEASE ORAL
Qty: 30 | Refills: 5 | Status: DISCONTINUED | COMMUNITY
Start: 2023-07-25 | End: 2023-08-01

## 2023-08-01 RX ORDER — PREDNISONE 10 MG/1
10 TABLET ORAL
Qty: 90 | Refills: 5 | Status: DISCONTINUED | COMMUNITY
Start: 2023-05-17 | End: 2023-08-01

## 2023-08-03 NOTE — ASSESSMENT
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 Was in remission with well compensated hemolytic anemia on low dose prednisone, but relapsed after tapered to 3 mg daily. 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  Was hospitalized with aflutter, now taking Diltiazem. Also found to have metapneumovirus but no intervention was needed.  Now getting insulin coverage for elevated sugar levels. Care discussed with Dr Rosario. As Hgb is 10.7 today, will hold off on Rituxan and continue to monitor counts. Will decrease Prednisone to 20 mg daily, pt aware of instructions.    Plan: Decrease Prednisone to 20 mg daily  PCP prophylaxis as per Cardiology- is taking Bactrim CMP, LDH Tacrolimus/ ASA per Cardiology. Folic acid To ER prn  RTC in 2 weeks

## 2023-08-03 NOTE — REVIEW OF SYSTEMS
[SOB on Exertion] : shortness of breath during exertion [Negative] : Allergic/Immunologic [FreeTextEntry7] : heart burn intermittently

## 2023-08-03 NOTE — PHYSICAL EXAM
[Restricted in physically strenuous activity but ambulatory and able to carry out work of a light or sedentary nature] : Status 1- Restricted in physically strenuous activity but ambulatory and able to carry out work of a light or sedentary nature, e.g., light house work, office work [Normal] : affect appropriate [de-identified] : RRR. S1S2 normal. Gr 2/6 holosystolic and holodiastolic murmur LLSB. No gallops.

## 2023-08-03 NOTE — HISTORY OF PRESENT ILLNESS
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 Treatment - prednisone/Rituxan x 1\par  8/2022 -- relapse with thrombocytopenia as well  (Grayson's syndrome)\par  2/2018 Cardiac transplant\par  Bilateral subclavian thrombosis\par  UGI bleed [de-identified] : Prednisone decreased from 30 to 20 mg daily today.  Denies fatigue, fevers, chills, night sweats, palpitations, pain, bleeding.  Has been taking insulin daily and using coverage as needed.   On 6/14/23 patient presented to [facility] ED with hypoglycemia (BMP glucose 36). Initially patient had diaphoresis and tremors which resolved with correction of his blood glucose. Endocrinology was consulted to assist with his diabetes regimen. Patient also presented with a significant NORMAN (Cr 3.19, FeUrea 30.1%) and found to have supra-therapeutic tacro levels. Transplant cardiology and cardiorenal were consulted to assist with his care. Recommended holding diuretics and adjusting the dose of his tacro and prophylactic medications. Discharged to [facility] on 6/23/23. Patient was readmitted to the hospital on 7/10/23 through 7/16/23, presented to [facility] ED from rehab with +SIRS (WBC, tachycardia) without identified source of infection, after being started on empiric meropenem at [facility] on 7/8. Admitted with leukocytosis and tachycardia, and continued on meropenem x 7 days, BCx 1/2 initially grew coag-negative staph but presumed contaminant, repeat blood cultures with no growth, UCx NGTD, CT C/A/P remarkable for RLL finding, possible consolidation that may suggest pneumonia vs atelectasis. Followed by transplant ID and Cardiology, recommended pulm consult for possible bronch/BAL. Persistent leukocytosis over duration of hospitalization, but no other evidence of infection. Patient discharged to [facility] on 7/16/23.  Today patient is feeling well, with no new complaints.

## 2023-08-10 NOTE — DISCHARGE NOTE ADULT - PRINCIPAL DIAGNOSIS
Medical screening examination initiated.  I have conducted a focused provider triage encounter, findings are as follows:    Brief history of present illness:  Urinary frequency for a few days. Also asking for STI screening.     There were no vitals filed for this visit.    Pertinent physical exam:  deferred.     Brief workup plan:  UA, UPT, DNA GC chl on urine.     Preliminary workup initiated; this workup will be continued and followed by the physician or advanced practice provider that is assigned to the patient when roomed.  
Pneumonia of left lower lobe due to infectious organism

## 2023-08-11 DIAGNOSIS — D50.9 IRON DEFICIENCY ANEMIA, UNSPECIFIED: ICD-10-CM

## 2023-08-15 ENCOUNTER — APPOINTMENT (OUTPATIENT)
Dept: ENDOCRINOLOGY | Facility: CLINIC | Age: 73
End: 2023-08-15
Payer: MEDICARE

## 2023-08-15 VITALS — HEIGHT: 65.5 IN | WEIGHT: 173 LBS | BODY MASS INDEX: 28.48 KG/M2

## 2023-08-15 PROCEDURE — 77080 DXA BONE DENSITY AXIAL: CPT | Mod: GA

## 2023-08-15 PROCEDURE — 96372 THER/PROPH/DIAG INJ SC/IM: CPT

## 2023-08-15 RX ORDER — DENOSUMAB 60 MG/ML
60 INJECTION SUBCUTANEOUS
Qty: 0 | Refills: 0 | Status: COMPLETED | OUTPATIENT
Start: 2023-08-15

## 2023-08-15 RX ADMIN — DENOSUMAB 0 MG/ML: 60 INJECTION SUBCUTANEOUS at 00:00

## 2023-08-16 ENCOUNTER — APPOINTMENT (OUTPATIENT)
Dept: HEART FAILURE | Facility: CLINIC | Age: 73
End: 2023-08-16

## 2023-08-17 ENCOUNTER — APPOINTMENT (OUTPATIENT)
Dept: HEMATOLOGY ONCOLOGY | Facility: CLINIC | Age: 73
End: 2023-08-17
Payer: MEDICARE

## 2023-08-17 ENCOUNTER — RESULT REVIEW (OUTPATIENT)
Age: 73
End: 2023-08-17

## 2023-08-17 VITALS
WEIGHT: 178.13 LBS | OXYGEN SATURATION: 96 % | RESPIRATION RATE: 18 BRPM | TEMPERATURE: 97.2 F | HEART RATE: 97 BPM | SYSTOLIC BLOOD PRESSURE: 125 MMHG | BODY MASS INDEX: 29.19 KG/M2 | DIASTOLIC BLOOD PRESSURE: 86 MMHG

## 2023-08-17 LAB
BASOPHILS # BLD AUTO: 0.01 K/UL — SIGNIFICANT CHANGE UP (ref 0–0.2)
BASOPHILS NFR BLD AUTO: 0.1 % — SIGNIFICANT CHANGE UP (ref 0–2)
EOSINOPHIL # BLD AUTO: 0.02 K/UL — SIGNIFICANT CHANGE UP (ref 0–0.5)
EOSINOPHIL NFR BLD AUTO: 0.2 % — SIGNIFICANT CHANGE UP (ref 0–6)
HCT VFR BLD CALC: 35.5 % — LOW (ref 39–50)
HGB BLD-MCNC: 10.6 G/DL — LOW (ref 13–17)
IMM GRANULOCYTES NFR BLD AUTO: 1.1 % — HIGH (ref 0–0.9)
LYMPHOCYTES # BLD AUTO: 1.64 K/UL — SIGNIFICANT CHANGE UP (ref 1–3.3)
LYMPHOCYTES # BLD AUTO: 17.8 % — SIGNIFICANT CHANGE UP (ref 13–44)
MCHC RBC-ENTMCNC: 27.2 PG — SIGNIFICANT CHANGE UP (ref 27–34)
MCHC RBC-ENTMCNC: 29.9 G/DL — LOW (ref 32–36)
MCV RBC AUTO: 91.3 FL — SIGNIFICANT CHANGE UP (ref 80–100)
MONOCYTES # BLD AUTO: 0.82 K/UL — SIGNIFICANT CHANGE UP (ref 0–0.9)
MONOCYTES NFR BLD AUTO: 8.9 % — SIGNIFICANT CHANGE UP (ref 2–14)
NEUTROPHILS # BLD AUTO: 6.64 K/UL — SIGNIFICANT CHANGE UP (ref 1.8–7.4)
NEUTROPHILS NFR BLD AUTO: 71.9 % — SIGNIFICANT CHANGE UP (ref 43–77)
NRBC # BLD: 0 /100 WBCS — SIGNIFICANT CHANGE UP (ref 0–0)
PLATELET # BLD AUTO: 166 K/UL — SIGNIFICANT CHANGE UP (ref 150–400)
RBC # BLD: 3.89 M/UL — LOW (ref 4.2–5.8)
RBC # FLD: 20.1 % — HIGH (ref 10.3–14.5)
WBC # BLD: 9.23 K/UL — SIGNIFICANT CHANGE UP (ref 3.8–10.5)
WBC # FLD AUTO: 9.23 K/UL — SIGNIFICANT CHANGE UP (ref 3.8–10.5)

## 2023-08-17 PROCEDURE — 99213 OFFICE O/P EST LOW 20 MIN: CPT

## 2023-08-21 ENCOUNTER — APPOINTMENT (OUTPATIENT)
Dept: HEART FAILURE | Facility: CLINIC | Age: 73
End: 2023-08-21
Payer: MEDICARE

## 2023-08-21 VITALS
BODY MASS INDEX: 28.66 KG/M2 | DIASTOLIC BLOOD PRESSURE: 83 MMHG | HEART RATE: 104 BPM | HEIGHT: 65 IN | WEIGHT: 172 LBS | TEMPERATURE: 97.6 F | OXYGEN SATURATION: 98 % | SYSTOLIC BLOOD PRESSURE: 126 MMHG

## 2023-08-21 DIAGNOSIS — E87.5 HYPERKALEMIA: ICD-10-CM

## 2023-08-21 DIAGNOSIS — D59.13 MIXED TYPE AUTOIMMUNE HEMOLYTIC ANEMIA: ICD-10-CM

## 2023-08-21 DIAGNOSIS — E87.70 FLUID OVERLOAD, UNSPECIFIED: ICD-10-CM

## 2023-08-21 PROCEDURE — 99214 OFFICE O/P EST MOD 30 MIN: CPT

## 2023-08-21 RX ORDER — INSULIN GLARGINE 100 [IU]/ML
100 INJECTION, SOLUTION SUBCUTANEOUS
Qty: 15 | Refills: 5 | Status: DISCONTINUED | COMMUNITY
Start: 2023-03-10 | End: 2023-08-21

## 2023-08-22 NOTE — HISTORY OF PRESENT ILLNESS
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 Feels stronger.  Denies fatigue, fevers, chills, night sweats, palpitations, pain, bleeding, edema, LOBATO. Is being followed by transplant team.  Has full time aide now. No longer using walker.   Has not needed insulin coverage in a few weeks since decreasing dose of Prednisone; glucose has been ranging 90-low 100's.    Today patient is feeling well, with no new complaints.

## 2023-08-22 NOTE — ASSESSMENT
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 Was in remission with well compensated hemolytic anemia on low dose prednisone, but relapsed after tapered to 3 mg daily. 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  Was hospitalized with aflutter, now taking Diltiazem. Also found to have metapneumovirus but no intervention was needed.  Now getting insulin coverage for elevated sugar levels. Care discussed with Dr Rosario. Hgb is 10.6 today, will hold off on Rituxan and continue to monitor counts.     Plan: Prednisone to 20 mg daily  PCP prophylaxis as per Cardiology- is taking Bactrim CMP, LDH Tacrolimus/ ASA per Cardiology. Folic acid To ER prn  RTC in 2 weeks

## 2023-08-22 NOTE — PHYSICAL EXAM
[Restricted in physically strenuous activity but ambulatory and able to carry out work of a light or sedentary nature] : Status 1- Restricted in physically strenuous activity but ambulatory and able to carry out work of a light or sedentary nature, e.g., light house work, office work [Normal] : affect appropriate [de-identified] : RRR. S1S2 normal. Gr 2/6 holosystolic and holodiastolic murmur LLSB. No gallops.

## 2023-08-22 NOTE — REVIEW OF SYSTEMS
[FreeTextEntry7] : heart burn intermittently [Negative] : Gastrointestinal [SOB on Exertion] : no shortness of breath during exertion

## 2023-08-24 ENCOUNTER — RX RENEWAL (OUTPATIENT)
Age: 73
End: 2023-08-24

## 2023-08-25 PROBLEM — D59.13: Status: ACTIVE | Noted: 2020-10-28

## 2023-08-25 PROBLEM — E87.70 VOLUME OVERLOAD: Status: ACTIVE | Noted: 2023-05-19

## 2023-08-25 PROBLEM — E87.5 HYPERKALEMIA: Status: ACTIVE | Noted: 2018-04-11

## 2023-08-25 RX ORDER — METOPROLOL SUCCINATE 200 MG/1
200 TABLET, EXTENDED RELEASE ORAL DAILY
Qty: 90 | Refills: 3 | Status: ACTIVE | COMMUNITY
Start: 2020-11-17 | End: 1900-01-01

## 2023-08-25 NOTE — HISTORY OF PRESENT ILLNESS
[FreeTextEntry1] : Mr. Baez is a 73 year old man with past medical history of a nonischemic cardiomyopathy and chronic systolic heart failure s/p HM2 LVAD in June 2017 complicated by recurrent GI hemorrhage and possible pump thrombosis who underwent heart transplant on 2/23/18 with a hepatitis C positive donor. His course was complicated by bilateral IJ/subclavian thrombi, a persistent small right sided pleural effusion, as well as acute on chronic renal failure of unclear etiology. In addition, his post-operative course was complicated by graft dysfunction of unclear etiology and persistent C4d positive staining on endomyocardial biopsy with negative DSAs. He developed joshua evidence of graft dysfunction leading to treatment with plasmapheresis, IVIG, and rituximab. Subsequently in June of 2018 he was found to have an pneumonia, that was ultimately diagnosed as Nocardia. This was successfully treated with therapy completed in August 2019.  In the spring 2020, he was admitted with hemolytic anemia of unclear etiology. There were no clear precipitating factors, such as infection. He was treated with prednisone and Rituximab. Given the potential for CNI inhibitors leading to hemolytic anemia, we transitioned him to everolimus and he remained on high dose prednisone. He was subsequently admitted with acute anemia (not hemolytic). He developed severe leukopenia and Klebsiella bacteremia. Following treatment of this, he developed a severe C. diff colitis infection leading to a prolonged recovery. He was weaned to lower dose prednisone and the everolimus was transitioned back to tacrolimus. At the time of discharge he was found to have low levels of CMV viremia and was started back on valganciclovir.   4th annual R/LHC and biopsy (2/28/2022) showed normal hemodynamics EMBX ISHLT Grade 0R, IF C4D 50% +1 staining, and larger LAD.  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 Discharged home and had been following closely with heme/onc.  Pt readmitted 3/3-3/18/23 who presented to [facility] ER on with new onset of chest tightness, found to be in Aflutter/fib. Hospital course c/b by URI w/ human metapneumovirus, normal LV function, acute on chronic kidney injury likely 2/2 hypovolemia, worsening leukocytosis with CT chest c/f PNA and hyperglycemia. Heme/onc consulted for hemolytic anemia management and prednisone dosing reduced to 60mg daily. Endocrine consulted for new onset of hyperglycemia and insulin teaching.  Bacterial infection-work up negative to date however had an elevated Fungitell.  Treated with broad spectrum abx from 3/3 to 3/8 (zosyn then cefepime).  Pt was cleared for discharge home on 3/18.  Pt was re-admitted on who p/w on 6/14/23 recurrent hypoglycemia at home 2/2 poor PO intake and NORMAN SCr 3.1. Endocrine adjusted insulin regimen, pt was hydrated and SCr returned to baseline . Pt was discharged to rehab center on 6/23/23. Discharge weight 177lbs.  Pt presented to clinic on 7/10 from [facility] and was on IV meropenem for unclear reason, thus pt was referred for admission to rule out infection. During hospitalization, due to leukocytosis, he was pancultured, CT chest/abd/pelvis completed, no signs or source of infection was found, but completed 7 day course of meropenem (end date 7/17). Pt was discharged home on 7/18/23.  Pt arrives today for follow up clinic appt. He arrives with his aid (now has services 7am -3pm 7 days/week).  Pt reports feeling good and now has PT 2x week at home. He reports he walks around the home and one block with his cane. B/L LE edema was worse but now better.   Aide is tracking VS and FS, weight stable 170-174 lbs, FS 94-227mg/dL, 's-140's/80-90's. Pt denied SOB, CP, palpitations, N/V/D, fever or chills.  Appetite good and sleeping fine.  Health maintenance: DEXA 5/2019, 6/2021: osteopenia, 6/2022: worsening osteopenia. Followed by Dr. Clemens. CT Colon (4/2017) IMPRESSION: No colonic polyp or mass. PSA 6.76 (4/29/2022) HgA1c 4.7% (2/17/23) --> 7.3 % (5/18/23)

## 2023-08-25 NOTE — DISCUSSION/SUMMARY
[FreeTextEntry1] : 5  yrs post transplant Issues: LAD/RV fistula with coronary dilation not amenable to intervention. Initial LV dysfunction initiated on GDMT, normalized.  hemolytic anemia/recent thrombocytopenia. Admission in feb 2023 for recurrent hem anemia. d/c on pred 75mg. Follow by Dr Rosario. Current dose of pred now reduced to 20. Plans are for initiation of ritoxan because of persistent anemia (not yet started).  intolerant of cellcept due leukopenia.  has had serious infections in the past including kleb sepsis and severe cdiff and CMV viremia. everolimus d/c for that reason.  Annual Feb 2023: no obstructive CAD. 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  Last Bx Feb 2022 OR.  C4D 50%. +1 staining.No histopath features of AMR. No   DSAs.  last allosure 5.26: < .12  Admitted: March 3-18. viral pneumonia. metapneumovirus. Afib flutter. self converted.  Admission May 23-6.2 Recurrent falls and volume overload. Diuresis. Mild LV dysfunction. No biopsy due to stable allosure.  Admission June 14-23: Found at home semi-concious. Hypoglycemia. Insulin adjusted. d/c nursing home pending resolution of adequate home supports.  Admitted July 10-17 from my clinic: he had come from nursing home from which we had no information. He was being treated meropenem.  Pancutlured, Panscanned. Completed dayne. no source. WBC elevated on admission and d/c  Returns now for routine follow up  Multiple medical issues: Plan:  continue tacro for goal 4-6 continue prednisone 20mg per heme continue ppx with valcyte, bactrim and nystatin while on high doses prednisone continue bumex 1 alt 0.5mg qod and follow weights. Encouraged compression stockings Tolerating toprol 150mg, increase to 200mg daily then with next pillbox start Losartan 25 QHS.  continue veltessa for hyperkalemia; f/u BMP this week Still needs to be scheduled for ritoxan, f/u with heme. Continue to check allosure every 3 mths; results pending now This patient has a heart allograft and is at risk for rejection through immune system recognition of non-self tissue. AlloMap and AlloSure and noninvasive tests ordered to evaluate for the probability of rejection after pretest and assist in immunosuppression management. Studies have shown that these tests can help to rule out rejection without subjecting the patient to the bleeding, arrhythmia, and structural damage risks of invasive endomyocardial biopsies. The AlloMap and AlloSure tests help guide clinical decision making for this patient's surveillance schedule and immunosuppression adjustments.  F/u Dr Campbell one month  Time spent with pt 35 mins

## 2023-08-25 NOTE — PHYSICAL EXAM
[Well Developed] : well developed [Well Nourished] : well nourished [Normal] : normal S1, S2, no murmur, no rub, no gallop [Normal S1, S2] : normal S1, S2 [Soft] : abdomen soft [Non Tender] : non-tender [de-identified] : no jvd [de-identified] : +III/VI diastolic murmur, tachycardic [de-identified] : 1+ pitting b/l LE edema

## 2023-08-25 NOTE — CARDIOLOGY SUMMARY
[de-identified] : \par  EKG 4/3/23: NSR 91bpm +RBBB\par  EKG 12/28/22: NSR 95 bpm +RBBB [de-identified] : DSE 12/22/2020: Conclusions:\par  1. Normal hemodynamic response.\par  2. Normal electrocardiographic response.\par  3. Overall preserved left ventricular systolic function\par  with mild hypokinesis of the mid to distal anterior wall at\par  baseline. Normal augmentation in left ventricular systolic\par  function with dobutamine infusion.\par  4. No evidence of inducible ischemia on stress\par  echocardiogram images.\par  *** Compared with echocardiogram of 12/2/2020, overall\par  preserved left ventricular systolic function at baseline\par  with mild hypokiesis of the mid to distal anterior wall.\par  Normal augmentation in left ventricular systolic function\par  with dobutamine infusion.\par  \par  2/20/20: DSE EF 50-55% no evidence of inducible ischemia on pharmacologic stress echo images [de-identified] : \par  TTE: 7/12/23- LVIDd 4.3, LVEF 55%, septal motion abnormal, grossly mild-mod enlarged, no reported valvulopathy\par  TTE: 6/16/23: CONCLUSIONS:\par   1. Normal left ventricular cavity size. The left ventricular wall thickness is normal. The left ventricular systolic function is normal with an ejection fraction of 56 % by Sherman's method of disks. There are no regional wall motion abnormalities seen.\par   2. Mildly enlarged right ventricular cavity size, normal wall thickness and probably normal systolic function. The tricuspid annular plane systolic excursion (TAPSE) is 1.0 cm (normal >=1.7 cm).\par   3. Mild mitral regurgitation.\par   4. Compared to the transthoracic echocardiogram performed on 5/19/2023 there have been no significant interval changes.\par  TTE 3/3/23: LVIDd 3.6, EF 50-55%, concentric remodeling \par  TTE 7/25/22: EF 60%, LVIDD 4.5cm, normal LV function, normal RV size with decreased RV function, mild TR, no pericardial effusion\par  \par  TTE 2/28/22: EF 65% LVIDD not calculated. normal biV function. 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 minimal TR. no pericardial effusion. [de-identified] : \par  CT chest/abd/pelvis 3/14/23: IMPRESSION:\par  Chest: Improving aeration of previously impacted right lower lobe distal  airways with improving bibasilar atelectasis. Additionally, there is a \par  small clustered area of unchanged nodular opacities which may represent \par  superimposed infection or inflammation. 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  No additional coronary-cameral fistulae noted. [de-identified] : C/RHC 2/28/22: \par  Diagnostic Conclusions: \par  mLAD to RV fistula with LM-pLAD dilatation. IVUS performed of the RCA\par  which was normal. Normal RHC\par  \par  12/2/2020: RHC/Biopsy ISHLT Grade 0R\par  11/18/2020:  L/RHC w/ IVUS:\par  CORONARY VESSELS: The coronary circulation is right dominant.\par  LM:   --  LM: The vessel was very large sized. Angiography showed\par  aneurysmal dilatation.\par  LAD:   --  Proximal LAD: The vessel was very large sized. Angiography\par  showed aneurysmal dilatation.\par  --  Mid LAD: The vessel was very large sized and excessively ectatic. A\par  fistula to the left ventricle was identified.\par  --  Distal LAD: Normal. 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 No significant intimal thickening/hyperplasia or\par  plaque was observed. [de-identified] : 3/15/23: b/l LE duplex\par  IMPRESSION: There is acute below the knee DVT affecting the right soleal and the left soleal and gastrocnemius veins.

## 2023-09-05 LAB
ALBUMIN SERPL ELPH-MCNC: 3.8 G/DL
ALP BLD-CCNC: 52 U/L
ALT SERPL-CCNC: 14 U/L
ANION GAP SERPL CALC-SCNC: 14 MMOL/L
AST SERPL-CCNC: 18 U/L
BILIRUB SERPL-MCNC: 0.5 MG/DL
BUN SERPL-MCNC: 41 MG/DL
CALCIUM SERPL-MCNC: 8.6 MG/DL
CHLORIDE SERPL-SCNC: 108 MMOL/L
CO2 SERPL-SCNC: 21 MMOL/L
CREAT SERPL-MCNC: 1.8 MG/DL
EGFR: 39 ML/MIN/1.73M2
GLUCOSE SERPL-MCNC: 101 MG/DL
LDH SERPL-CCNC: 270 U/L
POTASSIUM SERPL-SCNC: 3.5 MMOL/L
PROT SERPL-MCNC: 5.9 G/DL
SODIUM SERPL-SCNC: 143 MMOL/L

## 2023-09-06 RX ORDER — TACROLIMUS 0.5 MG/1
0.5 CAPSULE ORAL
Qty: 60 | Refills: 5 | Status: DISCONTINUED | COMMUNITY
Start: 2023-07-13 | End: 2023-09-06

## 2023-09-07 ENCOUNTER — RESULT REVIEW (OUTPATIENT)
Age: 73
End: 2023-09-07

## 2023-09-07 ENCOUNTER — APPOINTMENT (OUTPATIENT)
Dept: HEMATOLOGY ONCOLOGY | Facility: CLINIC | Age: 73
End: 2023-09-07
Payer: MEDICARE

## 2023-09-07 VITALS
OXYGEN SATURATION: 99 % | TEMPERATURE: 97.2 F | HEART RATE: 89 BPM | SYSTOLIC BLOOD PRESSURE: 129 MMHG | HEIGHT: 65 IN | RESPIRATION RATE: 17 BRPM | WEIGHT: 171.98 LBS | BODY MASS INDEX: 28.65 KG/M2 | DIASTOLIC BLOOD PRESSURE: 83 MMHG

## 2023-09-07 LAB
BASOPHILS # BLD AUTO: 0.01 K/UL — SIGNIFICANT CHANGE UP (ref 0–0.2)
BASOPHILS NFR BLD AUTO: 0.1 % — SIGNIFICANT CHANGE UP (ref 0–2)
EOSINOPHIL # BLD AUTO: 0.01 K/UL — SIGNIFICANT CHANGE UP (ref 0–0.5)
EOSINOPHIL NFR BLD AUTO: 0.1 % — SIGNIFICANT CHANGE UP (ref 0–6)
HCT VFR BLD CALC: 37.8 % — LOW (ref 39–50)
HGB BLD-MCNC: 11.2 G/DL — LOW (ref 13–17)
IMM GRANULOCYTES NFR BLD AUTO: 1.9 % — HIGH (ref 0–0.9)
LYMPHOCYTES # BLD AUTO: 0.92 K/UL — LOW (ref 1–3.3)
LYMPHOCYTES # BLD AUTO: 9.4 % — LOW (ref 13–44)
MCHC RBC-ENTMCNC: 27.3 PG — SIGNIFICANT CHANGE UP (ref 27–34)
MCHC RBC-ENTMCNC: 29.6 G/DL — LOW (ref 32–36)
MCV RBC AUTO: 92 FL — SIGNIFICANT CHANGE UP (ref 80–100)
MONOCYTES # BLD AUTO: 0.52 K/UL — SIGNIFICANT CHANGE UP (ref 0–0.9)
MONOCYTES NFR BLD AUTO: 5.3 % — SIGNIFICANT CHANGE UP (ref 2–14)
NEUTROPHILS # BLD AUTO: 8.12 K/UL — HIGH (ref 1.8–7.4)
NEUTROPHILS NFR BLD AUTO: 83.2 % — HIGH (ref 43–77)
NRBC # BLD: 0 /100 WBCS — SIGNIFICANT CHANGE UP (ref 0–0)
PLATELET # BLD AUTO: 196 K/UL — SIGNIFICANT CHANGE UP (ref 150–400)
RBC # BLD: 4.11 M/UL — LOW (ref 4.2–5.8)
RBC # FLD: 20.8 % — HIGH (ref 10.3–14.5)
WBC # BLD: 9.77 K/UL — SIGNIFICANT CHANGE UP (ref 3.8–10.5)
WBC # FLD AUTO: 9.77 K/UL — SIGNIFICANT CHANGE UP (ref 3.8–10.5)

## 2023-09-07 PROCEDURE — 99213 OFFICE O/P EST LOW 20 MIN: CPT

## 2023-09-07 NOTE — PHYSICAL EXAM
[Restricted in physically strenuous activity but ambulatory and able to carry out work of a light or sedentary nature] : Status 1- Restricted in physically strenuous activity but ambulatory and able to carry out work of a light or sedentary nature, e.g., light house work, office work [Normal] : affect appropriate [de-identified] : RRR. S1S2 normal. Gr 2/6 holosystolic and holodiastolic murmur LLSB. No gallops.

## 2023-09-07 NOTE — HISTORY OF PRESENT ILLNESS
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 Feels stronger and is more active.   Denies fatigue, fevers, chills, night sweats, palpitations, pain, bleeding, edema, LOBATO. Is being followed by transplant team.  Has full time aide now. No longer using walker.   Has not needed insulin coverage in a few weeks since decreasing dose of Prednisone; glucose has been ranging 90-low 100's.    Today patient is feeling well, with no new complaints.

## 2023-09-07 NOTE — ASSESSMENT
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 Was in remission with well compensated hemolytic anemia on low dose prednisone, but relapsed after tapered to 3 mg daily. 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  Was hospitalized with aflutter, now taking Diltiazem. Also found to have metapneumovirus but no intervention was needed.  Now getting insulin coverage for elevated sugar levels. Care discussed with Dr Rosario. Hgb is 11.2 today, will hold off on Rituxan and continue to monitor counts.     Plan: Prednisone to 20 mg daily  PCP prophylaxis as per Cardiology- is taking Bactrim CMP, LDH Tacrolimus/ ASA per Cardiology. Folic acid To ER prn  RTC in one month.

## 2023-09-08 LAB
ALBUMIN SERPL ELPH-MCNC: 4 G/DL
ALP BLD-CCNC: 51 U/L
ALT SERPL-CCNC: 23 U/L
ANION GAP SERPL CALC-SCNC: 14 MMOL/L
AST SERPL-CCNC: 23 U/L
BILIRUB SERPL-MCNC: 0.7 MG/DL
BUN SERPL-MCNC: 37 MG/DL
CALCIUM SERPL-MCNC: 9.6 MG/DL
CHLORIDE SERPL-SCNC: 110 MMOL/L
CO2 SERPL-SCNC: 22 MMOL/L
CREAT SERPL-MCNC: 1.73 MG/DL
EGFR: 41 ML/MIN/1.73M2
GLUCOSE SERPL-MCNC: 152 MG/DL
LDH SERPL-CCNC: 301 U/L
POTASSIUM SERPL-SCNC: 4.3 MMOL/L
PROT SERPL-MCNC: 6.1 G/DL
SODIUM SERPL-SCNC: 146 MMOL/L

## 2023-09-11 NOTE — PATIENT PROFILE ADULT. - NS PRO ABUSE SCREEN SUSPICION NEGLECT YN
1200 from OR to PACU 9. Connected to monitor. Report received from anesthesia & RN. VSS. 02 6L via mask. Breaths calm, even, unlabored. No piv . Surgical site with dermabond CD&I.    1216 parent brought to bedside. Patient tolerating water.    1221 popsicle given    1230 Discharge instructions reviewed with family member. All questions answered, verbalizes understanding.     1236 ID bands removed, assisted to change into own clothing. All personal belongings & discharge instructions with patient.    1238 Escorted to car via wheelchair, accompanied by RN.    
no

## 2023-10-04 NOTE — ED ADULT TRIAGE NOTE - PRO INTERPRETER NEED 2
Patient is a 57y old  Female who presents with a chief complaint of progressive dyspnea (03 Oct 2023 12:09)      SUBJECTIVE / OVERNIGHT EVENTS:    MEDICATIONS  (STANDING):  chlorhexidine 2% Cloths 1 Application(s) Topical daily  chlorhexidine 2% Cloths 1 Application(s) Topical <User Schedule>  dextrose 5%. 1000 milliLiter(s) (50 mL/Hr) IV Continuous <Continuous>  dextrose 5%. 1000 milliLiter(s) (100 mL/Hr) IV Continuous <Continuous>  dextrose 50% Injectable 12.5 Gram(s) IV Push once  dextrose 50% Injectable 25 Gram(s) IV Push once  dextrose 50% Injectable 25 Gram(s) IV Push once  glucagon  Injectable 1 milliGRAM(s) IntraMuscular once  insulin lispro (ADMELOG) corrective regimen sliding scale   SubCutaneous every 6 hours  levothyroxine 125 MICROGram(s) Oral daily  magnesium sulfate  IVPB 1 Gram(s) IV Intermittent once  NIFEdipine XL 60 milliGRAM(s) Oral daily  pantoprazole    Tablet 40 milliGRAM(s) Oral before breakfast  polyethylene glycol 3350 17 Gram(s) Oral daily  senna 2 Tablet(s) Oral at bedtime  torsemide 60 milliGRAM(s) Oral daily    MEDICATIONS  (PRN):  dextrose Oral Gel 15 Gram(s) Oral once PRN Blood Glucose LESS THAN 70 milliGRAM(s)/deciliter  melatonin 3 milliGRAM(s) Oral at bedtime PRN Insomnia  sodium chloride 0.9% Bolus. 100 milliLiter(s) IV Bolus every 5 minutes PRN SBP LESS THAN or EQUAL to 90 mmHg      Vital Signs Last 24 Hrs  T(C): 36.9 (04 Oct 2023 09:45), Max: 36.9 (04 Oct 2023 04:22)  T(F): 98.4 (04 Oct 2023 09:45), Max: 98.4 (04 Oct 2023 04:22)  HR: 78 (04 Oct 2023 09:45) (73 - 82)  BP: 140/60 (04 Oct 2023 09:45) (98/44 - 148/61)  BP(mean): --  RR: 16 (04 Oct 2023 09:45) (16 - 18)  SpO2: 94% (04 Oct 2023 09:45) (90% - 100%)    Parameters below as of 04 Oct 2023 09:45  Patient On (Oxygen Delivery Method): room air      CAPILLARY BLOOD GLUCOSE      POCT Blood Glucose.: 104 mg/dL (04 Oct 2023 09:25)  POCT Blood Glucose.: 244 mg/dL (04 Oct 2023 05:52)  POCT Blood Glucose.: 245 mg/dL (03 Oct 2023 22:17)  POCT Blood Glucose.: 301 mg/dL (03 Oct 2023 17:34)  POCT Blood Glucose.: 163 mg/dL (03 Oct 2023 13:54)  POCT Blood Glucose.: 210 mg/dL (03 Oct 2023 10:35)    I&O's Summary    03 Oct 2023 07:01  -  04 Oct 2023 07:00  --------------------------------------------------------  IN: 800 mL / OUT: 3350 mL / NET: -2550 mL        PHYSICAL EXAM:  GENERAL: NAD, well-developed  HEAD:  Atraumatic, Normocephalic  EYES: EOMI, PERRLA, conjunctiva and sclera clear  NECK: Supple, No JVD  CHEST/LUNG: Clear to auscultation bilaterally; No wheeze  HEART: Regular rate and rhythm; No murmurs, rubs, or gallops  ABDOMEN: Soft, Nontender, Nondistended; Bowel sounds present  EXTREMITIES:  2+ Peripheral Pulses, No clubbing, cyanosis, or edema  PSYCH: AAOx3  NEUROLOGY: non-focal  SKIN: No rashes or lesions    LABS:                        7.5    6.72  )-----------( 82       ( 04 Oct 2023 06:30 )             23.6     10-04    137  |  97<L>  |  20  ----------------------------<  220<H>  3.8   |  27  |  2.87<H>    Ca    8.1<L>      04 Oct 2023 06:30  Phos  2.0     10-04  Mg     1.40     10-04    TPro  6.4  /  Alb  3.3  /  TBili  0.5  /  DBili  x   /  AST  35<H>  /  ALT  105<H>  /  AlkPhos  547<H>  10-03    PT/INR - ( 04 Oct 2023 06:30 )   PT: 11.6 sec;   INR: 1.04 ratio         PTT - ( 04 Oct 2023 06:30 )  PTT:37.0 sec      Urinalysis Basic - ( 04 Oct 2023 06:30 )    Color: x / Appearance: x / SG: x / pH: x  Gluc: 220 mg/dL / Ketone: x  / Bili: x / Urobili: x   Blood: x / Protein: x / Nitrite: x   Leuk Esterase: x / RBC: x / WBC x   Sq Epi: x / Non Sq Epi: x / Bacteria: x        RADIOLOGY & ADDITIONAL TESTS:    Imaging Personally Reviewed:    Consultant(s) Notes Reviewed:      Care Discussed with Consultants/Other Providers:   English Patient is a 57y old  Female who presents with a chief complaint of progressive dyspnea (03 Oct 2023 12:09)      SUBJECTIVE / OVERNIGHT EVENTS: patient seen and examined by bedside, pt denies headache, dizziness, SOB, CP, Palpitations , N/V/D, abdominal pain  pt scheduled for tunnelled cath placement today      MEDICATIONS  (STANDING):  chlorhexidine 2% Cloths 1 Application(s) Topical daily  chlorhexidine 2% Cloths 1 Application(s) Topical <User Schedule>  dextrose 5%. 1000 milliLiter(s) (50 mL/Hr) IV Continuous <Continuous>  dextrose 5%. 1000 milliLiter(s) (100 mL/Hr) IV Continuous <Continuous>  dextrose 50% Injectable 12.5 Gram(s) IV Push once  dextrose 50% Injectable 25 Gram(s) IV Push once  dextrose 50% Injectable 25 Gram(s) IV Push once  glucagon  Injectable 1 milliGRAM(s) IntraMuscular once  insulin lispro (ADMELOG) corrective regimen sliding scale   SubCutaneous every 6 hours  levothyroxine 125 MICROGram(s) Oral daily  magnesium sulfate  IVPB 1 Gram(s) IV Intermittent once  NIFEdipine XL 60 milliGRAM(s) Oral daily  pantoprazole    Tablet 40 milliGRAM(s) Oral before breakfast  polyethylene glycol 3350 17 Gram(s) Oral daily  senna 2 Tablet(s) Oral at bedtime  torsemide 60 milliGRAM(s) Oral daily    MEDICATIONS  (PRN):  dextrose Oral Gel 15 Gram(s) Oral once PRN Blood Glucose LESS THAN 70 milliGRAM(s)/deciliter  melatonin 3 milliGRAM(s) Oral at bedtime PRN Insomnia  sodium chloride 0.9% Bolus. 100 milliLiter(s) IV Bolus every 5 minutes PRN SBP LESS THAN or EQUAL to 90 mmHg      Vital Signs Last 24 Hrs  T(C): 36.9 (04 Oct 2023 09:45), Max: 36.9 (04 Oct 2023 04:22)  T(F): 98.4 (04 Oct 2023 09:45), Max: 98.4 (04 Oct 2023 04:22)  HR: 78 (04 Oct 2023 09:45) (73 - 82)  BP: 140/60 (04 Oct 2023 09:45) (98/44 - 148/61)  BP(mean): --  RR: 16 (04 Oct 2023 09:45) (16 - 18)  SpO2: 94% (04 Oct 2023 09:45) (90% - 100%)    Parameters below as of 04 Oct 2023 09:45  Patient On (Oxygen Delivery Method): room air      CAPILLARY BLOOD GLUCOSE      POCT Blood Glucose.: 104 mg/dL (04 Oct 2023 09:25)  POCT Blood Glucose.: 244 mg/dL (04 Oct 2023 05:52)  POCT Blood Glucose.: 245 mg/dL (03 Oct 2023 22:17)  POCT Blood Glucose.: 301 mg/dL (03 Oct 2023 17:34)  POCT Blood Glucose.: 163 mg/dL (03 Oct 2023 13:54)  POCT Blood Glucose.: 210 mg/dL (03 Oct 2023 10:35)    I&O's Summary    03 Oct 2023 07:01  -  04 Oct 2023 07:00  --------------------------------------------------------  IN: 800 mL / OUT: 3350 mL / NET: -2550 mL      PHYSICAL EXAM:  GENERAL: NAD  HEENT:  Atraumatic, Normocephalic, EOMI, conjunctiva and sclera clear  NECK: Supple, R IJ Non tunneled cath (dressing c/d/i)  CHEST/LUNG: Clear to auscultation bilaterally; No wheeze, crackles or rhonchi   HEART: Regular rate and rhythm; Normal S1 S2, No murmurs, rubs, or gallops. s/p pericardial drain removal , site c/d/i w/o bleed  ABDOMEN: Soft, Nontender, Nondistended; Bowel sounds present  EXTREMITIES:  2+ Peripheral Pulses, No edema  PSYCH: AAOx3  NEUROLOGY: non-focal  SKIN: Warm and dry      LABS:                        7.5    6.72  )-----------( 82       ( 04 Oct 2023 06:30 )             23.6     10-04    137  |  97<L>  |  20  ----------------------------<  220<H>  3.8   |  27  |  2.87<H>    Ca    8.1<L>      04 Oct 2023 06:30  Phos  2.0     10-04  Mg     1.40     10-04    TPro  6.4  /  Alb  3.3  /  TBili  0.5  /  DBili  x   /  AST  35<H>  /  ALT  105<H>  /  AlkPhos  547<H>  10-03    PT/INR - ( 04 Oct 2023 06:30 )   PT: 11.6 sec;   INR: 1.04 ratio         PTT - ( 04 Oct 2023 06:30 )  PTT:37.0 sec      Urinalysis Basic - ( 04 Oct 2023 06:30 )    Color: x / Appearance: x / SG: x / pH: x  Gluc: 220 mg/dL / Ketone: x  / Bili: x / Urobili: x   Blood: x / Protein: x / Nitrite: x   Leuk Esterase: x / RBC: x / WBC x   Sq Epi: x / Non Sq Epi: x / Bacteria: x        RADIOLOGY & ADDITIONAL TESTS:    Imaging Personally Reviewed:    Consultant(s) Notes Reviewed:  Heme/onc     Care Discussed with Consultants/Other Providers:

## 2023-10-06 NOTE — H&P ADULT - SKIN/BREAST
negative
71 y/o male c/o LLE pain, redness and swelling for th3e past three days. Pt had MRI and did not show any fractures. Sent by PMD to r/o DVT.

## 2023-10-10 NOTE — PATIENT PROFILE ADULT - BRADEN ACTIVITY
Detail Level: Simple
Render Risk Assessment In Note?: no
Additional Notes: - Recommended kenalog injection for itch. Pt declined at this time.
(3) walks occasionally

## 2023-10-16 ENCOUNTER — OUTPATIENT (OUTPATIENT)
Dept: OUTPATIENT SERVICES | Facility: HOSPITAL | Age: 73
LOS: 1 days | Discharge: HOME | End: 2023-10-16

## 2023-10-16 DIAGNOSIS — D64.9 ANEMIA, UNSPECIFIED: ICD-10-CM

## 2023-10-16 DIAGNOSIS — Z94.1 HEART TRANSPLANT STATUS: Chronic | ICD-10-CM

## 2023-10-17 ENCOUNTER — APPOINTMENT (OUTPATIENT)
Dept: HEMATOLOGY ONCOLOGY | Facility: CLINIC | Age: 73
End: 2023-10-17
Payer: MEDICARE

## 2023-10-17 ENCOUNTER — RESULT REVIEW (OUTPATIENT)
Age: 73
End: 2023-10-17

## 2023-10-17 VITALS
DIASTOLIC BLOOD PRESSURE: 89 MMHG | HEIGHT: 65 IN | TEMPERATURE: 97.7 F | RESPIRATION RATE: 18 BRPM | HEART RATE: 64 BPM | BODY MASS INDEX: 28.99 KG/M2 | SYSTOLIC BLOOD PRESSURE: 127 MMHG | WEIGHT: 173.99 LBS | OXYGEN SATURATION: 97 %

## 2023-10-17 LAB
BASOPHILS # BLD AUTO: 0.02 K/UL — SIGNIFICANT CHANGE UP (ref 0–0.2)
BASOPHILS # BLD AUTO: 0.02 K/UL — SIGNIFICANT CHANGE UP (ref 0–0.2)
BASOPHILS NFR BLD AUTO: 0.2 % — SIGNIFICANT CHANGE UP (ref 0–2)
BASOPHILS NFR BLD AUTO: 0.2 % — SIGNIFICANT CHANGE UP (ref 0–2)
EOSINOPHIL # BLD AUTO: 0.02 K/UL — SIGNIFICANT CHANGE UP (ref 0–0.5)
EOSINOPHIL # BLD AUTO: 0.02 K/UL — SIGNIFICANT CHANGE UP (ref 0–0.5)
EOSINOPHIL NFR BLD AUTO: 0.2 % — SIGNIFICANT CHANGE UP (ref 0–6)
EOSINOPHIL NFR BLD AUTO: 0.2 % — SIGNIFICANT CHANGE UP (ref 0–6)
HCT VFR BLD CALC: 40.3 % — SIGNIFICANT CHANGE UP (ref 39–50)
HCT VFR BLD CALC: 40.3 % — SIGNIFICANT CHANGE UP (ref 39–50)
HGB BLD-MCNC: 12.1 G/DL — LOW (ref 13–17)
HGB BLD-MCNC: 12.1 G/DL — LOW (ref 13–17)
IMM GRANULOCYTES NFR BLD AUTO: 1.3 % — HIGH (ref 0–0.9)
IMM GRANULOCYTES NFR BLD AUTO: 1.3 % — HIGH (ref 0–0.9)
LYMPHOCYTES # BLD AUTO: 1.6 K/UL — SIGNIFICANT CHANGE UP (ref 1–3.3)
LYMPHOCYTES # BLD AUTO: 1.6 K/UL — SIGNIFICANT CHANGE UP (ref 1–3.3)
LYMPHOCYTES # BLD AUTO: 14.3 % — SIGNIFICANT CHANGE UP (ref 13–44)
LYMPHOCYTES # BLD AUTO: 14.3 % — SIGNIFICANT CHANGE UP (ref 13–44)
MCHC RBC-ENTMCNC: 27.7 PG — SIGNIFICANT CHANGE UP (ref 27–34)
MCHC RBC-ENTMCNC: 27.7 PG — SIGNIFICANT CHANGE UP (ref 27–34)
MCHC RBC-ENTMCNC: 30 G/DL — LOW (ref 32–36)
MCHC RBC-ENTMCNC: 30 G/DL — LOW (ref 32–36)
MCV RBC AUTO: 92.2 FL — SIGNIFICANT CHANGE UP (ref 80–100)
MCV RBC AUTO: 92.2 FL — SIGNIFICANT CHANGE UP (ref 80–100)
MONOCYTES # BLD AUTO: 0.91 K/UL — HIGH (ref 0–0.9)
MONOCYTES # BLD AUTO: 0.91 K/UL — HIGH (ref 0–0.9)
MONOCYTES NFR BLD AUTO: 8.2 % — SIGNIFICANT CHANGE UP (ref 2–14)
MONOCYTES NFR BLD AUTO: 8.2 % — SIGNIFICANT CHANGE UP (ref 2–14)
NEUTROPHILS # BLD AUTO: 8.46 K/UL — HIGH (ref 1.8–7.4)
NEUTROPHILS # BLD AUTO: 8.46 K/UL — HIGH (ref 1.8–7.4)
NEUTROPHILS NFR BLD AUTO: 75.8 % — SIGNIFICANT CHANGE UP (ref 43–77)
NEUTROPHILS NFR BLD AUTO: 75.8 % — SIGNIFICANT CHANGE UP (ref 43–77)
NRBC # BLD: 0 /100 WBCS — SIGNIFICANT CHANGE UP (ref 0–0)
NRBC # BLD: 0 /100 WBCS — SIGNIFICANT CHANGE UP (ref 0–0)
PLATELET # BLD AUTO: 195 K/UL — SIGNIFICANT CHANGE UP (ref 150–400)
PLATELET # BLD AUTO: 195 K/UL — SIGNIFICANT CHANGE UP (ref 150–400)
RBC # BLD: 4.37 M/UL — SIGNIFICANT CHANGE UP (ref 4.2–5.8)
RBC # BLD: 4.37 M/UL — SIGNIFICANT CHANGE UP (ref 4.2–5.8)
RBC # FLD: 20.4 % — HIGH (ref 10.3–14.5)
RBC # FLD: 20.4 % — HIGH (ref 10.3–14.5)
WBC # BLD: 11.16 K/UL — HIGH (ref 3.8–10.5)
WBC # BLD: 11.16 K/UL — HIGH (ref 3.8–10.5)
WBC # FLD AUTO: 11.16 K/UL — HIGH (ref 3.8–10.5)
WBC # FLD AUTO: 11.16 K/UL — HIGH (ref 3.8–10.5)

## 2023-10-17 PROCEDURE — 99213 OFFICE O/P EST LOW 20 MIN: CPT

## 2023-10-17 NOTE — PROGRESS NOTE ADULT - ATTENDING COMMENTS
Patient seen and examined with dr Diop    I agree with his interval history exam and plans as noted above    Low grade fevers but non toxic appearing and reports feeling okay  Breathing room air    Blood culture growing gram + cocci in prs.  Identification and sensitivity pending    Gary Lujan MD  Can be called via Teams  After 5pm/weekends [phone] Please call and follow up with your doctor in 1-3 days.    Use Tylenol every 6 hours and Motrin 6 hours as need for fever/pain.     Return to the Emergency Department for worsening or persistent symptoms, and/or ANY NEW OR CONCERNING SYMPTOMS. If you have issues obtaining follow up, please call: [phone] ([phone] or [phone]  to obtain a doctor or specialist who takes your insurance in your area.    Llame y cori un seguimiento con trevino médico en 1 a 3 días.    Use Tylenol cada 6 horas y Motrin 6 horas según sea necesario para la fiebre/dolor.    Regrese al Departamento de Emergencias si los síntomas empeoran o persisten, y/o CUALQUIER SÍNTOMA NUEVO O PREOCUPANTE. Si tiene problemas para obtener un seguimiento, llame al: [phone] ([phone] o al [phone] para obtener un médico o especialista que acepte trevino seguro en trevino área.    Enfermedad mano-pie-boca    LO QUE NECESITA SABER:    ¿Qué es la enfermedad mano-pie-boca (EMPB)?La enfermedad mano-pie-boca es robert infección causada por un virus. La enfermedad mano-pie-boca se propaga con facilidad se transmite fácilmente de persona a persona por el contacto directo. Cualquier persona puede contraer la enfermedad mano-pie-boca, marta es más común en niños menores de 5 años.  Enfermedad de deborah, pies y boca    ¿Cuáles son los signos y síntomas de la EMPB?Lo siguiente puede aparecer de 3 a 7 días después de la infección y normalmente desaparece en 7 a 10 días:    Fiebre    Dolor de garganta    Falta de apetito    Sarpullido, llagas o ampollas cheema dolorosas en o alrededor de la boca, la garganta, las deborah, los pies o el área del pañal  ¿Cómo se diagnostica la EMPB?El médico generalmente puede diagnosticar la EMPB mediante un examen físico. Infórmele si usted ha estado cerca de alguien que tiene la EMPB. Un médico también puede hacerle un hisopado en la garganta o nariz o chris robert muestra de materia fecal. Estas muestras serán enviadas a un laboratorio para detectar el virus que causa la EMPB.    ¿Cómo se trata la EMPB?La enfermedad mano-pie-boca usualmente desaparece por sí clarita sin tratamiento. Lo siguiente puede ayudarlo a sentirse más cómodo hasta que los síntomas desaparezcan:    [place] líquidos adicionales, feroz se le indique.Los líquidos ayudarán a evitar la deshidratación. Pregunte a trevino médico qué cantidad de líquido debe beber a diario y qué líquidos le recomienda.    Consuma alimentos y líquidos que hilary fáciles de tragar.Por ejemplo, alimentos fríos, feroz las paletas de helado, los licuados o los helados. No tome refrescos, bebidas calientes ni alimentos ácidos, feroz salsa de tomate o jugo de naranja.    Los medicamentos pueden ayudar a disminuir la fiebre o el dolor.Trevino médico le dirá cuáles medicamentos usar y jacquelyn cuánto tiempo usarlos. No le de aspirina a un malvin harvey de 18 años. La aspirina puede causar robert reacción potencialmente mortal conocida feroz síndrome de [place].  ¿Cómo evito la propagación de la EMPB?Usted puede propagar el virus semanas después que los síntomas mullen desaparecido. Los siguientes factores pueden ayudar a prevenir la propagación de la enfermedad mano-pie-boca:    Lávese las deborah frecuentemente.Utilice agua y jabón. Lávese las deborah después de usar el baño, cambiarle el pañal a un malvin o estornudar. Lávese las deborah antes de comer o preparar alimentos.  Lavado de deborah      Quédese en casa, no vaya al trabajo ni a la escuelasi tiene fiebre o si las ampollas están abiertas. No besar, no abrazar ni compartir alimentos o bebidas.    Lave todos los elementos y las superficiescon lejía diluida. Duarte incluye juguetes, mesas, mostradores y perillas de las scott.  ¿Cuándo marilou buscar atención inmediata?    Tiene dificultad para respirar, está respirando muy rápido o expectora esputo huertas y espumoso.    Tiene fiebre reginaldo y trevino corazón late mucho más rápido de lo habitual.    Tiene un dolor de naomy severo, rigidez de allen y dolor de espalda.    Usted está confundido y tiene sueño.    Tiene dificultad para moverse, o no puede  parte de trevino cuerpo.    Usted orina menos de lo normal o no esta orinando.  ¿Cuándo marilou llamar a mi médico?    Trevino boca y trevino garganta están salomon adoloridas que no puede consumir alimentos ni líquidos.    Trevino fiebre, dolor de garganta, llagas en la boca, o erupción cutánea no desaparecen después de 10 días.    Usted tiene preguntas o inquietudes acerca de trevino condición o cuidado.  ACUERDOS SOBRE TREVINO CUIDADO:    Usted tiene el derecho de ayudar a planear trevino cuidado. Aprenda todo lo que pueda sobre trevino condición y feroz darle tratamiento. Discuta debra opciones de tratamiento con debra médicos para decidir el cuidado que usted desea recibir. Usted siempre tiene el derecho de rechazar el tratamiento.

## 2023-10-19 LAB
ALBUMIN SERPL ELPH-MCNC: 4 G/DL
ALP BLD-CCNC: 60 U/L
ALT SERPL-CCNC: 15 U/L
ANION GAP SERPL CALC-SCNC: 13 MMOL/L
AST SERPL-CCNC: 18 U/L
BILIRUB SERPL-MCNC: 0.7 MG/DL
BUN SERPL-MCNC: 34 MG/DL
CALCIUM SERPL-MCNC: 9.1 MG/DL
CHLORIDE SERPL-SCNC: 108 MMOL/L
CO2 SERPL-SCNC: 24 MMOL/L
CREAT SERPL-MCNC: 1.69 MG/DL
EGFR: 42 ML/MIN/1.73M2
GLUCOSE SERPL-MCNC: 158 MG/DL
LDH SERPL-CCNC: 312 U/L
POTASSIUM SERPL-SCNC: 4.5 MMOL/L
PROT SERPL-MCNC: 5.9 G/DL
SODIUM SERPL-SCNC: 145 MMOL/L

## 2023-10-23 ENCOUNTER — NON-APPOINTMENT (OUTPATIENT)
Age: 73
End: 2023-10-23

## 2023-10-24 ENCOUNTER — APPOINTMENT (OUTPATIENT)
Dept: HEART FAILURE | Facility: CLINIC | Age: 73
End: 2023-10-24
Payer: MEDICARE

## 2023-10-24 VITALS
OXYGEN SATURATION: 94 % | HEIGHT: 65 IN | TEMPERATURE: 98 F | WEIGHT: 173 LBS | BODY MASS INDEX: 28.82 KG/M2 | DIASTOLIC BLOOD PRESSURE: 80 MMHG | SYSTOLIC BLOOD PRESSURE: 135 MMHG | HEART RATE: 101 BPM

## 2023-10-24 PROCEDURE — 93000 ELECTROCARDIOGRAM COMPLETE: CPT

## 2023-10-24 PROCEDURE — 99215 OFFICE O/P EST HI 40 MIN: CPT

## 2023-10-30 ENCOUNTER — MED ADMIN CHARGE (OUTPATIENT)
Age: 73
End: 2023-10-30

## 2023-10-30 ENCOUNTER — APPOINTMENT (OUTPATIENT)
Dept: INFECTIOUS DISEASE | Facility: CLINIC | Age: 73
End: 2023-10-30
Payer: MEDICARE

## 2023-10-30 VITALS
BODY MASS INDEX: 28.99 KG/M2 | OXYGEN SATURATION: 99 % | DIASTOLIC BLOOD PRESSURE: 84 MMHG | SYSTOLIC BLOOD PRESSURE: 127 MMHG | HEART RATE: 112 BPM | TEMPERATURE: 98.2 F | WEIGHT: 174 LBS | HEIGHT: 65 IN

## 2023-10-30 PROCEDURE — 99214 OFFICE O/P EST MOD 30 MIN: CPT

## 2023-11-02 NOTE — ED ADULT NURSE NOTE - TOBACCO USE
Jeremi Paul from Pharmacy called in asking for clarification on direction and Quantity  of Prednisone he would like a call back
Unknown if ever smoked

## 2023-11-03 NOTE — PATIENT PROFILE ADULT. - NS PRO MODE OF ARRIVAL
Date of Service: 11/02/2023    SLEEP CENTER FOLLOWUP    HISTORY OF PRESENT ILLNESS:    88-year old white male with history of severe sleep-disordered breathing with AHI 78 based on polysomnogram performed at [facility] 02/23/2021.  He was seen 1 year ago, remains very compliant with auto CPAP range of 5-15 cm water pressure.  He receives his disposable equipment including [place] mask through [employer].      Download data 07/27/2023 through 10/24/2023 reveals nightly usage an average of 7-1/2 hours per night.  Delivered pressure range between 12.9-14.9 cm of water pressure, 95th percentile pressure is 14.6.  Residual AHI is 5.1. Mask is acceptable.    Additionally, he utilized Clonazepam 2 mg for sleep onset/sleep maintenance insomnia and toleration of CPAP.  He has been on this for many years.  Alert, awake, refreshed during the daytime.  Denies hangover effect or side effects.    IMPRESSION:    1.  Obstructive sleep apnea.  The patient is compliant with auto CPAP, deriving benefit.  Based on download data will adjust pressures to a range of 8-17 cm of water pressure, this will be done by [employer]. Machine is 2-1/2 years old.  2.  New disposal equipment including [place] mask through [employer].  3.  Medication management.  Patient is compliant with Clonazepam 2 mg, very well controlled without side effects.  Continue lowest dose that is effective.    He will see me again in 1 year via telehealth visit or sooner if problems arise.      Dictated By: TULIO Whalen  Signing Provider: MD TOM Alxeandra/april ([id number])   DD: 11/02/2023 12:00:33 PM TD: 11/03/2023 5:19:38 AM      Copy Sent To:  Vivek Egan MD   stretcher

## 2023-11-15 NOTE — H&P ADULT - PROBLEM SELECTOR PROBLEM 5
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       Past Surgical History:   Procedure Laterality Date    ENDOSCOPIC ULTRASOUND OF UPPER GASTROINTESTINAL TRACT N/A 10/23/2023    Procedure: ULTRASOUND, UPPER GI TRACT, ENDOSCOPIC;  Surgeon: Emil Villatoro MD;  [facility]: [facility]  Service: Endoscopy;  Laterality: N/A;    ERCP N/A 10/23/2023    Procedure: ERCP (ENDOSCOPIC RETROGRADE CHOLANGIOPANCREATOGRAPHY);  Surgeon: Emil Villatoro MD;  Location: [facility]  Service: Endoscopy;  Laterality: N/A;    ESOPHAGOGASTRODUODENOSCOPY N/A 9/18/2023    Procedure: EGD (ESOPHAGOGASTRODUODENOSCOPY);  Surgeon: Kg Cleaning MD;  Location: [facility]  Service: Endoscopy;  Laterality: N/A;       Review of patient's allergies indicates:   Allergen Reactions    Alto Swelling    Pcn [penicillins] Hives     Tolerates cephalosporins    Pecan nut Swelling    Sulfa (sulfonamide antibiotics) Hives       Current Facility-Administered Medications on File Prior to Encounter   Medication    [DISCONTINUED] apixaban tablet 5 mg    [DISCONTINUED] lrdlavfti-lypoxgsh-wgsubbk ala -25 mg (25 kg or greater) 1 tablet    [DISCONTINUED] buprenorphine-naloxone 8-2 mg SL film 2 Film    [DISCONTINUED] folic acid tablet 1 mg    [DISCONTINUED] HYDROmorphone tablet 2 mg    [DISCONTINUED] lipase-protease-amylase 6,000-19,000-30,000 units per capsule 2 capsule    [DISCONTINUED] methocarbamoL tablet 750 mg    [DISCONTINUED] pantoprazole EC tablet 40 mg    [DISCONTINUED] promethazine tablet 25 mg      Current Outpatient Medications on File Prior to Encounter   Medication Sig    apixaban (ELIQUIS) 5 mg Tab Take 1 tablet (5 mg total) by mouth 2 (two) times daily.    vpmyvbneh-opvaqxbe-vsspoax ala (BIKTARVY) -25 mg (25 kg or greater) Take 1 tablet by mouth once daily.    buprenorphine-naloxone 8-2 mg (SUBOXONE) 8-2 mg Place 2 Film under the tongue once daily. for 7 days    folic acid (FOLVITE) 1 MG tablet Take 1 tablet (1 mg total) by mouth once daily.    HYDROmorphone (DILAUDID) 2 MG tablet Take 1 tablet (2 mg total) by mouth every 6 (six) hours as needed for Pain.    lipase-protease-amylase 6,000-19,000-30,000 units (CREON 6000) 6,000-19,000 -30,000 unit capsule Take 2 capsules by mouth 3 (three) times daily with meals.    methocarbamoL (ROBAXIN) 750 MG Tab Take 1 tablet (750 mg total) by mouth every 6 (six) hours. for 7 days    pantoprazole (PROTONIX) 40 MG tablet Take 1 tablet (40 mg total) by mouth 2 (two) times daily.    promethazine (PHENERGAN) 25 MG tablet Take 25 mg by mouth every 4 (four) hours as needed for Nausea.     Family History       Problem Relation (Age of Onset)    Breast cancer Maternal Grandmother    Cancer Mother, Brother    No Known Problems Father    Ovarian cancer Mother    Pancreatitis Mother          Tobacco Use    Smoking status: Former     Types: Cigarettes    Smokeless tobacco: Never   Substance and Sexual Activity    Alcohol use: Not Currently    Drug use: Never    Sexual activity: Not on file     Review of Systems   Constitutional:  Negative for activity change, appetite change, chills, fatigue and fever.   Eyes:  Negative for photophobia and visual disturbance.   Respiratory:  Negative for cough, chest tightness, shortness of breath and wheezing.    Cardiovascular:  Negative for chest pain.   Gastrointestinal:  Positive for abdominal pain, nausea and vomiting. Negative for abdominal distention, blood in stool, constipation and diarrhea.   Genitourinary:  Negative for  difficulty urinating and dysuria.   Musculoskeletal:  Negative for gait problem.   Skin:  Negative for color change.   Neurological:  Negative for dizziness, weakness, light-headedness and headaches.   Psychiatric/Behavioral:  Negative for agitation, behavioral problems and confusion.      Objective:     Vital Signs (Most Recent):  Temp: 97.9 °F (36.6 °C) (11/15/23 0027)  Pulse: 65 (11/15/23 0027)  Resp: 16 (11/15/23 0027)  BP: 110/61 (11/15/23 0027)  SpO2: 98 % (11/15/23 0027) Vital Signs (24h Range):  Temp:  [97.9 °F (36.6 °C)-98.9 °F (37.2 °C)] 97.9 °F (36.6 °C)  Pulse:  [65-97] 65  Resp:  [16-18] 16  SpO2:  [98 %] 98 %  BP: (110-132)/(61-88) 110/61     Weight: 70.3 kg (155 lb)  Body mass index is 22.24 kg/m².     Physical Exam  Constitutional:       General: He is not in acute distress.     Appearance: Normal appearance. He is normal weight. He is not ill-appearing or toxic-appearing.   HENT:      Head: Normocephalic and atraumatic.      Right Ear: External ear normal.      Left Ear: External ear normal.      Nose: Nose normal.   Eyes:      General: No scleral icterus.     Pupils: Pupils are equal, round, and reactive to light.   Cardiovascular:      Rate and Rhythm: Normal rate and regular rhythm.      Heart sounds: No murmur heard.     No friction rub.   Pulmonary:      Effort: Pulmonary effort is normal. No respiratory distress.      Breath sounds: Normal breath sounds. No wheezing or rales.   Abdominal:      General: Abdomen is flat. There is no distension.      Palpations: Abdomen is soft. There is no mass.      Tenderness: There is abdominal tenderness. There is no guarding or rebound.   Musculoskeletal:         General: No swelling or tenderness.      Cervical back: Normal range of motion and neck supple.   Skin:     General: Skin is warm and dry.      Coloration: Skin is not jaundiced.   Neurological:      General: No focal deficit present.      Mental Status: He is alert and oriented to person, place,  and time.   Psychiatric:         Mood and Affect: Mood normal.         Behavior: Behavior normal.              CRANIAL NERVES     CN III, IV, VI   Pupils are equal, round, and reactive to light.       Significant Labs: All pertinent labs within the past 24 hours have been reviewed.  CBC:   Recent Labs   Lab 11/14/23 1923 11/14/23 2136   WBC 5.80  --    HGB 9.2*  --    HCT 29.5* 28*   *  --      CMP:   Recent Labs   Lab 11/14/23 2134      K 3.6      CO2 26   GLU 84   BUN 5*   CREATININE 0.7   CALCIUM 9.4   PROT 6.7   ALBUMIN 3.0*   BILITOT 0.2   ALKPHOS 72   AST 15   ALT 8*   ANIONGAP 11     Lactic Acid:   Recent Labs   Lab 11/14/23 1923   LACTATE 1.5     Lipase:   Recent Labs   Lab 11/14/23 1923   LIPASE 398*     Urine Studies:   Recent Labs   Lab 11/13/23  1006   COLORU Yellow   GLUCUA Normal   BILIRUBINUA Negative   OCCULTUA Negative   UROBILINOGEN Normal   LEUKOCYTESUR Negative       Significant Imaging: I have reviewed all pertinent imaging results/findings within the past 24 hours.  Imaging Results               CT Abdomen Pelvis With IV Contrast (Final result)  Result time 11/14/23 21:32:23      Final result by Wilver Henry MD (11/14/23 21:32:23)                   Impression:      1. History of pancreatitis with large lobulated peripancreatic fluid collection could represent a large pancreatic pseudocyst, mildly increased in size from the prior study.  See above comments.  Pancreatic inflammatory changes have decreased.  See above comments.  Recommend clinical correlation and follow-up.  2. Small bibasilar pleural effusions with mild associated atelectasis.  3. Hepatosplenomegaly with chronic splenic vein thrombosis with multiple collateral vessels noted similar to prior studies.  4. Mild diffuse urinary bladder wall thickening could represent cystitis.  Recommend clinical correlation and follow-up.  5. Stable mild dilation of the appendix to approximately 9 mm with mild wall  thickening and mild adjacent stranding.  This is similar to the prior study.  Mild acute appendicitis is not excluded.  Recommend clinical correlation and follow-up.  6. Possible constipation.  7. Mild nonspecific free fluid in the pelvis.  8. This report was flagged in Epic as abnormal.      Electronically signed by: Wilver Lombardo  Date:    11/14/2023  Time:    21:32               Narrative:    EXAMINATION:  CT ABDOMEN PELVIS WITH IV CONTRAST    CLINICAL HISTORY:  Pancreatitis, acute, severe;    TECHNIQUE:  Low dose axial images, sagittal and coronal reformations were obtained from the lung bases to the pubic symphysis following the IV administration of 75 mL of Omnipaque 350 .  Oral contrast was not administered.    COMPARISON:  11/03/2023    FINDINGS:  Abdomen:    - Lower thorax:    - Lung bases: Small bibasilar pleural effusions with mild associated atelectasis.    - Liver: The liver is enlarged.  No focal abnormality.    - Gallbladder: No calcified gallstones.    - Bile Ducts: No evidence of intra or extra hepatic biliary ductal dilation.    - Spleen: Spleen is enlarged.  No focal abnormality.    Probable chronic splenic vein thrombosis with multiple collateral vessels adjacent to the spleen and stomach similar to prior studies..    - Kidneys: No stone, mass or hydronephrosis.    - Adrenals: Unremarkable.    - Pancreas: Large lobulated peripancreatic fluid collection.  There are 3 connecting components measuring 6.8 x 6.2 cm on axial 70, 8.4 x 3.7 cm on axial 50 and 2.3 x 3.8 cm on axial 36.  Mild mass effect on the IVC from the smallest component on axial 36, similar to prior studies.  This could represent a large pancreatic could pseudocyst..  This has mildly increased in size from the prior study.  Pancreatic inflammatory changes have decreased.  History of pancreatitis.    - Retroperitoneum:  No significant adenopathy.    - Vascular: No abdominal aortic aneurysm.  Retroaortic left renal vein incidentally  noted.    - Abdominal wall:  Small fat containing periumbilical hernia.    Pelvis:    Mild diffuse wall thickening of the urinary bladder could represent cystitis.  No focal abnormality.    Bowel/Mesentery:    No evidence of bowel obstruction.  Moderate retained feces in the colon.    Mild free fluid in the pelvis.    Stable mild dilation of the appendix to approximately 9 mm with mild wall thickening and mild adjacent stranding.  This is similar to the prior study.  Mild acute appendicitis is a consideration.    Bones:  No acute osseous abnormality and no suspicious lytic or blastic lesion.                                       X-Ray Chest PA And Lateral (Final result)  Result time 11/14/23 20:38:57      Final result by Nimesh Stockton MD (11/14/23 20:38:57)                   Impression:      Overall improved compared to prior.  On the lateral view, trace residual effusions remains in the posterior sulci and subsegmental atelectatic change remains in lower lobes.      Electronically signed by: Nimesh Stockton MD  Date:    11/14/2023  Time:    20:38               Narrative:    EXAMINATION:  XR CHEST PA AND LATERAL    CLINICAL HISTORY:  Unspecified abdominal pain    TECHNIQUE:  PA and lateral views of the chest were performed.    COMPARISON:  11/05/2023.    FINDINGS:  On the lateral view, trace residual effusions remains in the posterior sulci and subsegmental atelectatic change remains in lower lobes.    There is no consolidation or pneumothorax.    Cardiomediastinal silhouette is unremarkable.    Regional osseous structures are similar to prior.                                       Anemia Heart replaced by transplant

## 2023-11-16 NOTE — DIETITIAN INITIAL EVALUATION ADULT - ETIOLOGY
Body Location Override (Optional - Billing Will Still Be Based On Selected Body Map Location If Applicable): right chest endocrine dysfunction

## 2023-11-28 ENCOUNTER — APPOINTMENT (OUTPATIENT)
Dept: HEMATOLOGY ONCOLOGY | Facility: CLINIC | Age: 73
End: 2023-11-28
Payer: MEDICARE

## 2023-11-28 ENCOUNTER — RESULT REVIEW (OUTPATIENT)
Age: 73
End: 2023-11-28

## 2023-11-28 VITALS
TEMPERATURE: 97.6 F | HEART RATE: 99 BPM | DIASTOLIC BLOOD PRESSURE: 87 MMHG | SYSTOLIC BLOOD PRESSURE: 134 MMHG | OXYGEN SATURATION: 96 % | RESPIRATION RATE: 18 BRPM | WEIGHT: 177.47 LBS | BODY MASS INDEX: 29.53 KG/M2

## 2023-11-28 LAB
BASOPHILS # BLD AUTO: 0.02 K/UL — SIGNIFICANT CHANGE UP (ref 0–0.2)
BASOPHILS # BLD AUTO: 0.02 K/UL — SIGNIFICANT CHANGE UP (ref 0–0.2)
BASOPHILS NFR BLD AUTO: 0.2 % — SIGNIFICANT CHANGE UP (ref 0–2)
BASOPHILS NFR BLD AUTO: 0.2 % — SIGNIFICANT CHANGE UP (ref 0–2)
EOSINOPHIL # BLD AUTO: 0.03 K/UL — SIGNIFICANT CHANGE UP (ref 0–0.5)
EOSINOPHIL # BLD AUTO: 0.03 K/UL — SIGNIFICANT CHANGE UP (ref 0–0.5)
EOSINOPHIL NFR BLD AUTO: 0.2 % — SIGNIFICANT CHANGE UP (ref 0–6)
EOSINOPHIL NFR BLD AUTO: 0.2 % — SIGNIFICANT CHANGE UP (ref 0–6)
HCT VFR BLD CALC: 40.7 % — SIGNIFICANT CHANGE UP (ref 39–50)
HCT VFR BLD CALC: 40.7 % — SIGNIFICANT CHANGE UP (ref 39–50)
HGB BLD-MCNC: 12.5 G/DL — LOW (ref 13–17)
HGB BLD-MCNC: 12.5 G/DL — LOW (ref 13–17)
IMM GRANULOCYTES NFR BLD AUTO: 2.4 % — HIGH (ref 0–0.9)
IMM GRANULOCYTES NFR BLD AUTO: 2.4 % — HIGH (ref 0–0.9)
LYMPHOCYTES # BLD AUTO: 1.9 K/UL — SIGNIFICANT CHANGE UP (ref 1–3.3)
LYMPHOCYTES # BLD AUTO: 1.9 K/UL — SIGNIFICANT CHANGE UP (ref 1–3.3)
LYMPHOCYTES # BLD AUTO: 14.9 % — SIGNIFICANT CHANGE UP (ref 13–44)
LYMPHOCYTES # BLD AUTO: 14.9 % — SIGNIFICANT CHANGE UP (ref 13–44)
MCHC RBC-ENTMCNC: 29.4 PG — SIGNIFICANT CHANGE UP (ref 27–34)
MCHC RBC-ENTMCNC: 29.4 PG — SIGNIFICANT CHANGE UP (ref 27–34)
MCHC RBC-ENTMCNC: 30.7 G/DL — LOW (ref 32–36)
MCHC RBC-ENTMCNC: 30.7 G/DL — LOW (ref 32–36)
MCV RBC AUTO: 95.8 FL — SIGNIFICANT CHANGE UP (ref 80–100)
MCV RBC AUTO: 95.8 FL — SIGNIFICANT CHANGE UP (ref 80–100)
MONOCYTES # BLD AUTO: 1.15 K/UL — HIGH (ref 0–0.9)
MONOCYTES # BLD AUTO: 1.15 K/UL — HIGH (ref 0–0.9)
MONOCYTES NFR BLD AUTO: 9 % — SIGNIFICANT CHANGE UP (ref 2–14)
MONOCYTES NFR BLD AUTO: 9 % — SIGNIFICANT CHANGE UP (ref 2–14)
NEUTROPHILS # BLD AUTO: 9.31 K/UL — HIGH (ref 1.8–7.4)
NEUTROPHILS # BLD AUTO: 9.31 K/UL — HIGH (ref 1.8–7.4)
NEUTROPHILS NFR BLD AUTO: 73.3 % — SIGNIFICANT CHANGE UP (ref 43–77)
NEUTROPHILS NFR BLD AUTO: 73.3 % — SIGNIFICANT CHANGE UP (ref 43–77)
NRBC # BLD: 0 /100 WBCS — SIGNIFICANT CHANGE UP (ref 0–0)
NRBC # BLD: 0 /100 WBCS — SIGNIFICANT CHANGE UP (ref 0–0)
PLATELET # BLD AUTO: 181 K/UL — SIGNIFICANT CHANGE UP (ref 150–400)
PLATELET # BLD AUTO: 181 K/UL — SIGNIFICANT CHANGE UP (ref 150–400)
RBC # BLD: 4.25 M/UL — SIGNIFICANT CHANGE UP (ref 4.2–5.8)
RBC # BLD: 4.25 M/UL — SIGNIFICANT CHANGE UP (ref 4.2–5.8)
RBC # FLD: 18.7 % — HIGH (ref 10.3–14.5)
RBC # FLD: 18.7 % — HIGH (ref 10.3–14.5)
WBC # BLD: 12.72 K/UL — HIGH (ref 3.8–10.5)
WBC # BLD: 12.72 K/UL — HIGH (ref 3.8–10.5)
WBC # FLD AUTO: 12.72 K/UL — HIGH (ref 3.8–10.5)
WBC # FLD AUTO: 12.72 K/UL — HIGH (ref 3.8–10.5)

## 2023-11-28 PROCEDURE — 99213 OFFICE O/P EST LOW 20 MIN: CPT

## 2023-11-30 LAB
ALBUMIN SERPL ELPH-MCNC: 4 G/DL
ALP BLD-CCNC: 49 U/L
ALT SERPL-CCNC: 14 U/L
ANION GAP SERPL CALC-SCNC: 13 MMOL/L
AST SERPL-CCNC: 19 U/L
BILIRUB SERPL-MCNC: 0.6 MG/DL
BUN SERPL-MCNC: 36 MG/DL
CALCIUM SERPL-MCNC: 8.9 MG/DL
CHLORIDE SERPL-SCNC: 106 MMOL/L
CO2 SERPL-SCNC: 24 MMOL/L
CREAT SERPL-MCNC: 1.33 MG/DL
EGFR: 56 ML/MIN/1.73M2
GLUCOSE SERPL-MCNC: 114 MG/DL
LDH SERPL-CCNC: 370 U/L
POTASSIUM SERPL-SCNC: 4.7 MMOL/L
PROT SERPL-MCNC: 6.2 G/DL
SODIUM SERPL-SCNC: 143 MMOL/L

## 2023-12-04 ENCOUNTER — APPOINTMENT (OUTPATIENT)
Dept: DERMATOLOGY | Facility: CLINIC | Age: 73
End: 2023-12-04
Payer: MEDICARE

## 2023-12-04 DIAGNOSIS — D22.9 MELANOCYTIC NEVI, UNSPECIFIED: ICD-10-CM

## 2023-12-04 DIAGNOSIS — L82.1 OTHER SEBORRHEIC KERATOSIS: ICD-10-CM

## 2023-12-04 DIAGNOSIS — L85.3 XEROSIS CUTIS: ICD-10-CM

## 2023-12-04 PROCEDURE — 99213 OFFICE O/P EST LOW 20 MIN: CPT

## 2023-12-05 ENCOUNTER — APPOINTMENT (OUTPATIENT)
Dept: HEART FAILURE | Facility: CLINIC | Age: 73
End: 2023-12-05
Payer: MEDICARE

## 2023-12-05 VITALS
TEMPERATURE: 97.3 F | OXYGEN SATURATION: 96 % | WEIGHT: 174 LBS | BODY MASS INDEX: 28.99 KG/M2 | DIASTOLIC BLOOD PRESSURE: 73 MMHG | HEART RATE: 107 BPM | SYSTOLIC BLOOD PRESSURE: 110 MMHG | HEIGHT: 65 IN

## 2023-12-05 DIAGNOSIS — I25.41 CORONARY ARTERY ANEURYSM: ICD-10-CM

## 2023-12-05 PROCEDURE — 99214 OFFICE O/P EST MOD 30 MIN: CPT

## 2023-12-06 ENCOUNTER — APPOINTMENT (OUTPATIENT)
Dept: INFECTIOUS DISEASE | Facility: CLINIC | Age: 73
End: 2023-12-06

## 2023-12-07 NOTE — CONSULT NOTE ADULT - SUBJECTIVE AND OBJECTIVE BOX
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 Patient was treated w/ prednisone and Rituximab. Tacrolimus was discontinued and patient was also transitioned to Everolimus. Endocrinology consult requested for management of hyperglycemia in patient with NODAT. On admission serum glucose was 531, Patient was not in DKA.     Endocrine History:  Patient known to endocrine service, he was diagnosed with NODAT in 5/2018.   Patient was discharged on Lantus 7units qam and Humalog 3units premeal.   Patient reports he was discontinued off insulin by doctor as his fingersticks improved. However in the last few weeks noticed FS were elevated.   Patient reports checking Fs twice daily, qam fasting and before dinner, reports FS were 250-300s. No hypoglycemic events. Patient reports he has only taken insulin 3 times this week as he ran out of medication.    Uncertain if there were any recent changes in steroid dose. Patient currently on prednisone 60mg daily and everolimus Tacrolimus discontinued   States yesterday he had pig tail, noodles chicken with skin and beans. Reports in the evening FS was in the 400s and nurse instructed patient to go to ED.  Patient reports blurred vision and neuropathy. Patient to follow up with opthalmology within the next month.   Patient does not have family history of DM    PAST MEDICAL & SURGICAL HISTORY:  H/O hemolytic anemia  H/O autoimmune hemolytic anemia  Knee pain, right  HLD (hyperlipidemia)  Former smoker  DVT of upper extremity (deep vein thrombosis)  Hepatitis C virus  GIB (gastrointestinal bleeding)  Ventricular fibrillation: s/p AICD  PAF (paroxysmal atrial fibrillation): on xarelto  Non-Ischemic Cardiomyopathy  SVT (Supraventricular Tachycardia)  HTN  CHF (Congestive Heart Failure)  S/P right heart catheterization: biopsy multiple  H/O heart transplant: 2/2018  Status post left hip replacement  History of Prior Ablation Treatment: for afib  AICD (Automatic Cardioverter/Defibrillator) Present: [employer] with 1 [employer] lead4/1/09- explanted and replaced with [employer] 2 leads on 9/2/09    FAMILY HISTORY:  No family history of DM    Social History:  h/o of tobacco use  no alcohol use     Outpatient Medications:  · 	sodium bicarbonate 650 mg oral tablet: 1 tab(s) orally 2 times a day 8AM & 8PM  · 	magnesium oxide 400 mg (241.3 mg elemental magnesium) oral tablet: 1 tab(s) orally   · 	pantoprazole 40 mg oral delayed release tablet: 1 tab(s) orally once a day (before a meal)  · 	sulfamethoxazole-trimethoprim 400 mg-80 mg oral tablet: 1 tab(s) orally once a day  · 	folic acid 1 mg oral tablet: 1 tab(s) orally once a day @ 8AM  · 	cholecalciferol oral tablet: 1000 unit(s) orally once a day  · 	everolimus 0.75 mg oral tablet: 1 tab(s) orally 2 times a day 8AM & 8PM  · 	bacitracin 500 units/g topical ointment: 1 application topically 2 times a day  · 	valGANciclovir 450 mg oral tablet: 1 tab(s) orally   · 	aspirin 81 mg oral delayed release tablet: 1 tab(s) orally once a day  · 	pravastatin 20 mg oral tablet: 1 tab(s) orally once a day (at bedtime)  · 	posaconazole 100 mg oral delayed release tablet: 3 tab(s) orally once a day  	@ 8am  · 	acetaminophen 325 mg oral tablet: 2 tab(s) orally every 6 hours, As needed, Mild Pain (1 - 3)  · 	predniSONE 20 mg oral tablet: 3 tab(s) orally   · 	Colace 50 mg oral capsule: 1 cap(s) orally 2 times a day, As Needed  · 	sodium zirconium cyclosilicate 10 g oral powder for reconstitution: 1 tab(s) orally once a day @ 4pm    MEDICATIONS  (STANDING):  aspirin enteric coated 81 milliGRAM(s) Oral daily  cholecalciferol 1000 Unit(s) Oral daily  dextrose 5%. 1000 milliLiter(s) (50 mL/Hr) IV Continuous <Continuous>  dextrose 50% Injectable 12.5 Gram(s) IV Push once  dextrose 50% Injectable 25 Gram(s) IV Push once  dextrose 50% Injectable 25 Gram(s) IV Push once  everolimus (ZORTRESS) 0.75 milliGRAM(s) Oral <User Schedule>  folic acid 1 milliGRAM(s) Oral daily  insulin glargine Injectable (LANTUS) 14 Unit(s) SubCutaneous at bedtime  insulin lispro (HumaLOG) corrective regimen sliding scale   SubCutaneous three times a day before meals  insulin lispro (HumaLOG) corrective regimen sliding scale   SubCutaneous at bedtime  insulin lispro Injectable (HumaLOG) 4 Unit(s) SubCutaneous three times a day with meals  magnesium oxide 400 milliGRAM(s) Oral daily  pantoprazole    Tablet 40 milliGRAM(s) Oral before breakfast  posaconazole DR Tablet 300 milliGRAM(s) Oral daily  pravastatin 20 milliGRAM(s) Oral at bedtime  predniSONE   Tablet 60 milliGRAM(s) Oral <User Schedule>  sodium bicarbonate 650 milliGRAM(s) Oral two times a day  sodium chloride 0.9% lock flush 3 milliLiter(s) IV Push every 8 hours  trimethoprim   80 mG/sulfamethoxazole 400 mG 1 Tablet(s) Oral daily  valGANciclovir 450 milliGRAM(s) Oral daily    MEDICATIONS  (PRN):  acetaminophen   Tablet .. 650 milliGRAM(s) Oral every 6 hours PRN Mild Pain (1 - 3)  dextrose 40% Gel 15 Gram(s) Oral once PRN Blood Glucose LESS THAN 70 milliGRAM(s)/deciLiter  glucagon  Injectable 1 milliGRAM(s) IntraMuscular once PRN Glucose <70 milliGRAM(s)/deciLiter    Allergies  No Known Allergies    Review of Systems:  Constitutional: No fever  Eyes: + blurry vision  Neuro: No tremors  HEENT: No pain  Cardiovascular: No chest pain, palpitations  Respiratory: No SOB, no cough  GI: No nausea, vomiting, abdominal pain  : No dysuria  Skin: no rash  Psych: no depression  Endocrine: no polyuria, polydipsia  Hem/lymph: no swelling    ALL OTHER SYSTEMS REVIEWED AND NEGATIVE      PHYSICAL EXAM:  VITALS: T(C): 36.6 (06-17-20 @ 12:27)  T(F): 97.8 (06-17-20 @ 12:27), Max: 98.1 (06-16-20 @ 19:45)  HR: 112 (06-17-20 @ 12:27) (93 - 126)  BP: 143/90 (06-17-20 @ 12:27) (136/67 - 160/89)  RR:  (18 - 18)  SpO2:  (96% - 99%)  Wt(kg): 73kg   GENERAL: NAD, well-groomed, well-developed  EYES: No proptosis, anicteric  HEENT:  Atraumatic, Normocephalic, moist mucous membranes  THYROID: Normal size, no palpable nodules, nontender   RESPIRATORY: Clear to auscultation bilaterally; No rales, rhonchi, wheezing  CARDIOVASCULAR: Regular rate and rhythm; + murmurs no peripheral edema  GI: Soft, nontender, non distended  SKIN: Dry, intact, No rashes or lesions  MUSCULOSKELETAL: Full range of motion, normal strength  NEURO: sensation intact, extraocular movements intact, no tremor  PSYCH: Alert and oriented x 3, reactive affect     POCT Blood Glucose.: 119 mg/dL (06-17-20 @ 12:14)H4   POCT Blood Glucose.: 165 mg/dL (06-17-20 @ 07:50) H4+1  POCT Blood Glucose.: 257 mg/dL (06-17-20 @ 04:47) H3   POCT Blood Glucose.: 136 mg/dL (06-17-20 @ 00:34)  POCT Blood Glucose.: 357 mg/dL (06-16-20 @ 20:42) L14  POCT Blood Glucose.: 464 mg/dL (06-16-20 @ 19:06) H6  POCT Blood Glucose.: 470 mg/dL (06-16-20 @ 19:05)  POCT Blood Glucose.: 458 mg/dL (06-16-20 @ 18:52)  POCT Blood Glucose.: 519 mg/dL (06-16-20 @ 18:51)  POCT Blood Glucose.: 511 mg/dL (06-16-20 @ 18:22)  POCT Blood Glucose.: 521 mg/dL (06-16-20 @ 17:24)  POCT Blood Glucose.: 496 mg/dL (06-16-20 @ 14:37)                            9.5    6.73  )-----------( 122      ( 17 Jun 2020 14:13 )             28.1       06-17    136  |  103  |  25<H>  ----------------------------<  140<H>  3.4<L>   |  20<L>  |  1.05    EGFR if : 83  EGFR if non : 72    Ca    8.6      06-17  Mg     2.1     06-16  Phos  3.0     06-16    TPro  6.0  /  Alb  3.8  /  TBili  1.0  /  DBili  x   /  AST  26  /  ALT  39  /  AlkPhos  84  06-17 none

## 2023-12-08 RX ORDER — PREDNISONE 10 MG/1
10 TABLET ORAL DAILY
Qty: 30 | Refills: 10 | Status: DISCONTINUED | COMMUNITY
Start: 2023-08-01 | End: 2023-12-08

## 2023-12-18 ENCOUNTER — APPOINTMENT (OUTPATIENT)
Dept: INFECTIOUS DISEASE | Facility: CLINIC | Age: 73
End: 2023-12-18
Payer: MEDICARE

## 2023-12-18 DIAGNOSIS — Z23 ENCOUNTER FOR IMMUNIZATION: ICD-10-CM

## 2023-12-18 PROCEDURE — G0010: CPT

## 2023-12-18 PROCEDURE — 90739 HEPB VACC 2/4 DOSE ADULT IM: CPT

## 2023-12-18 PROCEDURE — 90715 TDAP VACCINE 7 YRS/> IM: CPT | Mod: GY

## 2023-12-18 PROCEDURE — 90472 IMMUNIZATION ADMIN EACH ADD: CPT

## 2023-12-19 PROBLEM — I25.41 FISTULA, CORONARY ARTERY: Status: ACTIVE | Noted: 2020-12-10

## 2023-12-19 NOTE — CARDIOLOGY SUMMARY
[de-identified] : EKG 10/24/23 pending read EKG 4/3/23: NSR 91bpm +RBBB EKG 12/28/22: NSR 95 bpm +RBBB [de-identified] : DSE 12/22/2020: Conclusions:\par  1. Normal hemodynamic response.\par  2. Normal electrocardiographic response.\par  3. Overall preserved left ventricular systolic function\par  with mild hypokinesis of the mid to distal anterior wall at\par  baseline. Normal augmentation in left ventricular systolic\par  function with dobutamine infusion.\par  4. No evidence of inducible ischemia on stress\par  echocardiogram images.\par  *** Compared with echocardiogram of 12/2/2020, overall\par  preserved left ventricular systolic function at baseline\par  with mild hypokiesis of the mid to distal anterior wall.\par  Normal augmentation in left ventricular systolic function\par  with dobutamine infusion.\par  \par  2/20/20: DSE EF 50-55% no evidence of inducible ischemia on pharmacologic stress echo images [de-identified] : \par  TTE: 7/12/23- LVIDd 4.3, LVEF 55%, septal motion abnormal, grossly mild-mod enlarged, no reported valvulopathy\par  TTE: 6/16/23: CONCLUSIONS:\par   1. Normal left ventricular cavity size. The left ventricular wall thickness is normal. The left ventricular systolic function is normal with an ejection fraction of 56 % by Sherman's method of disks. There are no regional wall motion abnormalities seen.\par   2. Mildly enlarged right ventricular cavity size, normal wall thickness and probably normal systolic function. The tricuspid annular plane systolic excursion (TAPSE) is 1.0 cm (normal >=1.7 cm).\par   3. Mild mitral regurgitation.\par   4. Compared to the transthoracic echocardiogram performed on 5/19/2023 there have been no significant interval changes.\par  TTE 3/3/23: LVIDd 3.6, EF 50-55%, concentric remodeling \par  TTE 7/25/22: EF 60%, LVIDD 4.5cm, normal LV function, normal RV size with decreased RV function, mild TR, no pericardial effusion\par  \par  TTE 2/28/22: EF 65% LVIDD not calculated. normal biV function. 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 minimal TR. no pericardial effusion. [de-identified] : \par  CT chest/abd/pelvis 3/14/23: IMPRESSION:\par  Chest: Improving aeration of previously impacted right lower lobe distal  airways with improving bibasilar atelectasis. Additionally, there is a \par  small clustered area of unchanged nodular opacities which may represent \par  superimposed infection or inflammation. 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  No additional coronary-cameral fistulae noted. [de-identified] : C/RHC 2/28/22: \par  Diagnostic Conclusions: \par  mLAD to RV fistula with LM-pLAD dilatation. IVUS performed of the RCA\par  which was normal. Normal RHC\par  \par  12/2/2020: RHC/Biopsy ISHLT Grade 0R\par  11/18/2020:  L/RHC w/ IVUS:\par  CORONARY VESSELS: The coronary circulation is right dominant.\par  LM:   --  LM: The vessel was very large sized. Angiography showed\par  aneurysmal dilatation.\par  LAD:   --  Proximal LAD: The vessel was very large sized. Angiography\par  showed aneurysmal dilatation.\par  --  Mid LAD: The vessel was very large sized and excessively ectatic. A\par  fistula to the left ventricle was identified.\par  --  Distal LAD: Normal. 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 No significant intimal thickening/hyperplasia or\par  plaque was observed. [de-identified] : 3/15/23: b/l LE duplex\par  IMPRESSION: There is acute below the knee DVT affecting the right soleal and the left soleal and gastrocnemius veins.

## 2023-12-19 NOTE — DISCUSSION/SUMMARY
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 Initial LV dysfunction initiated on GDMT, normalized. (last EF 55, RVD) hemolytic anemia/recent thrombocytopenia. Admission in feb 2023 for recurrent hem anemia. d/c on pred 75mg. Follow by Dr Rosario. Current dose of pred 20. Last seen by Dr Rosario last week. No plans for ritoxan as per his note.  on prolea for osteoperosis  intolerant of cellcept due leukopenia. has had serious infections in the past including kleb sepsis and severe cdiff and CMV viremia. everolimus d/c for that reason. Annual Feb 2023: no obstructive CAD. 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 Last Bx Feb 2022 OR. C4D 50%. +1 staining.No histopath features of AMR.  Last DSAs May 0%. Last heart care Aug 2023 Allosure and allomap normal Admissions: Admitted: March 3-18. viral pneumonia. metapneumovirus. Afib flutter. self converted. Admission May 23-6.2 Recurrent falls and volume overload. Diuresis. Mild LV dysfunction. No biopsy due to stable allosure. Admission June 14-23: Found at home semi-concious. Hypoglycemia. Insulin adjusted. d/c nursing home pending resolution of adequate home supports. Admitted July 10-17 from my clinic: he had come from nursing home from which we had no information. He was being treated meropenem. Pancultured, Panscanned. Completed dayne. no source. WBC elevated on admission and d/c. No admissions since July. Remarkable stable over past several months.  Plan;  continue current immunosuppression. goal tacro level 4-6. Last trough 10/23/23 = 6.6 Remains on ppx medications while on high dose steroids. Continue Bactrim SS M/W/F, Valcyte renally dosed 450mg TIW and nystatin s&s qid c/w losartan 25 QHS and Toprol XL 200mg daily (LVD), monitor K Scheduled for Annual in January 2024,  Echo.  Continue to monitor [facility] Q6m (next due 2/2024) D/w Heme/onc NP and Dr Rosario about any plans for steriod sparing strategy F/u with ID for routine vaccines  RTC/annual in January with Dr Campbell Time spent with pt 35 min

## 2023-12-19 NOTE — HISTORY OF PRESENT ILLNESS
[FreeTextEntry1] : Mr. Baez is a 73 year old man with past medical history of a nonischemic cardiomyopathy and chronic systolic heart failure s/p HM2 LVAD in June 2017 complicated by recurrent GI hemorrhage and possible pump thrombosis who underwent heart transplant on 2/23/18 with a hepatitis C positive donor. His course was complicated by bilateral IJ/subclavian thrombi, a persistent small right sided pleural effusion, as well as acute on chronic renal failure of unclear etiology. In addition, his post-operative course was complicated by graft dysfunction of unclear etiology and persistent C4d positive staining on endomyocardial biopsy with negative DSAs. He developed joshua evidence of graft dysfunction leading to treatment with plasmapheresis, IVIG, and rituximab. Subsequently in June of 2018 he was found to have an pneumonia, that was ultimately diagnosed as Nocardia. This was successfully treated with therapy completed in August 2019.  In the spring 2020, he was admitted with hemolytic anemia of unclear etiology. There were no clear precipitating factors, such as infection. He was treated with prednisone and Rituximab. Given the potential for CNI inhibitors leading to hemolytic anemia, we transitioned him to everolimus and he remained on high dose prednisone. He was subsequently admitted with acute anemia (not hemolytic). He developed severe leukopenia and Klebsiella bacteremia. Following treatment of this, he developed a severe C. diff colitis infection leading to a prolonged recovery. He was weaned to lower dose prednisone and the everolimus was transitioned back to tacrolimus. At the time of discharge he was found to have low levels of CMV viremia and was started back on valganciclovir.   4th annual R/LHC and biopsy (2/28/2022) showed normal hemodynamics EMBX ISHLT Grade 0R, IF C4D 50% +1 staining, and larger LAD.  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 Discharged home and had been following closely with heme/onc.  Pt readmitted 3/3-3/18/23 who presented to [facility] on with new onset of chest tightness, found to be in Aflutter/fib. Hospital course c/b by URI w/ human metapneumovirus, normal LV function, acute on chronic kidney injury likely 2/2 hypovolemia, worsening leukocytosis with CT chest c/f PNA and hyperglycemia. Heme/onc consulted for hemolytic anemia management and prednisone dosing reduced to 60mg daily. Endocrine consulted for new onset of hyperglycemia and insulin teaching.  Bacterial infection-work up negative to date however had an elevated Fungitell.  Treated with broad spectrum abx from 3/3 to 3/8 (zosyn then cefepime).  Pt was cleared for discharge home on 3/18.  Pt was re-admitted on who p/w on 6/14/23 recurrent hypoglycemia at home 2/2 poor PO intake and NORMAN SCr 3.1. Endocrine adjusted insulin regimen, pt was hydrated and SCr returned to baseline . Pt was discharged to rehab center on 6/23/23. Discharge weight 177lbs.  Pt presented to clinic on 7/10 from [facility] and was on IV meropenem for unclear reason, thus pt was referred for admission to rule out infection. During hospitalization, due to leukocytosis, he was pancultured, CT chest/abd/pelvis completed, no signs or source of infection was found, but completed 7 day course of meropenem (end date 7/17). Pt was discharged home on 7/18/23.  Pt arrives today for follow up clinic appt 5.75 years post heart transplant. He arrives with his aid (has services 7am -3pm 7 days/week).  Pt reports feeling good and takes daily walks with his cane   Aide is tracking VS and FS, weight stable 172-174 lbs, Checks AM -120's mg/dL mostly, 's-140's/80-90's. Pt denied SOB, CP, palpitations, N/V/D, fever or chills.  Appetite good and sleeping fine.  Follows closely with hematology.  Health maintenance: DEXA 5/2019, 6/2021: osteopenia, 6/2022: worsening osteopenia. Followed by Dr. Clemens. CT Colon (4/2017) IMPRESSION: No colonic polyp or mass. PSA 6.76 (4/29/2022) HgA1c 4.7% (2/17/23) --> 7.3 % (5/18/23)

## 2023-12-19 NOTE — PHYSICAL EXAM
[de-identified] : no jvd [de-identified] : +III/VI diastolic murmur, tachycardic [de-identified] : 1+ pitting b/l ankle edema

## 2023-12-21 ENCOUNTER — RX RENEWAL (OUTPATIENT)
Age: 73
End: 2023-12-21

## 2023-12-21 RX ORDER — PRAVASTATIN SODIUM 20 MG/1
20 TABLET ORAL
Refills: 10 | Status: ACTIVE | COMMUNITY
Start: 2023-07-13 | End: 1900-01-01

## 2023-12-27 ENCOUNTER — OUTPATIENT (OUTPATIENT)
Dept: OUTPATIENT SERVICES | Facility: HOSPITAL | Age: 73
LOS: 1 days | Discharge: HOME | End: 2023-12-27

## 2023-12-27 DIAGNOSIS — Z94.1 HEART TRANSPLANT STATUS: Chronic | ICD-10-CM

## 2023-12-27 DIAGNOSIS — D64.9 ANEMIA, UNSPECIFIED: ICD-10-CM

## 2024-01-04 ENCOUNTER — APPOINTMENT (OUTPATIENT)
Dept: CV DIAGNOSITCS | Facility: HOSPITAL | Age: 74
End: 2024-01-04

## 2024-01-08 ENCOUNTER — APPOINTMENT (OUTPATIENT)
Dept: INFECTIOUS DISEASE | Facility: CLINIC | Age: 74
End: 2024-01-08
Payer: MEDICARE

## 2024-01-08 PROCEDURE — 90472 IMMUNIZATION ADMIN EACH ADD: CPT

## 2024-01-08 PROCEDURE — 90677 PCV20 VACCINE IM: CPT

## 2024-01-08 PROCEDURE — 90734 MENACWYD/MENACWYCRM VACC IM: CPT | Mod: GY

## 2024-01-08 PROCEDURE — G0009: CPT

## 2024-01-09 ENCOUNTER — RESULT REVIEW (OUTPATIENT)
Age: 74
End: 2024-01-09

## 2024-01-09 ENCOUNTER — APPOINTMENT (OUTPATIENT)
Dept: HEMATOLOGY ONCOLOGY | Facility: CLINIC | Age: 74
End: 2024-01-09
Payer: MEDICARE

## 2024-01-09 VITALS
DIASTOLIC BLOOD PRESSURE: 86 MMHG | RESPIRATION RATE: 16 BRPM | WEIGHT: 176.15 LBS | SYSTOLIC BLOOD PRESSURE: 131 MMHG | BODY MASS INDEX: 29.31 KG/M2 | OXYGEN SATURATION: 99 % | HEART RATE: 63 BPM | TEMPERATURE: 97.8 F

## 2024-01-09 LAB
BASOPHILS # BLD AUTO: 0.02 K/UL — SIGNIFICANT CHANGE UP (ref 0–0.2)
BASOPHILS # BLD AUTO: 0.02 K/UL — SIGNIFICANT CHANGE UP (ref 0–0.2)
BASOPHILS NFR BLD AUTO: 0.2 % — SIGNIFICANT CHANGE UP (ref 0–2)
BASOPHILS NFR BLD AUTO: 0.2 % — SIGNIFICANT CHANGE UP (ref 0–2)
EOSINOPHIL # BLD AUTO: 0.03 K/UL — SIGNIFICANT CHANGE UP (ref 0–0.5)
EOSINOPHIL # BLD AUTO: 0.03 K/UL — SIGNIFICANT CHANGE UP (ref 0–0.5)
EOSINOPHIL NFR BLD AUTO: 0.3 % — SIGNIFICANT CHANGE UP (ref 0–6)
EOSINOPHIL NFR BLD AUTO: 0.3 % — SIGNIFICANT CHANGE UP (ref 0–6)
HCT VFR BLD CALC: 38.5 % — LOW (ref 39–50)
HCT VFR BLD CALC: 38.5 % — LOW (ref 39–50)
HGB BLD-MCNC: 11.8 G/DL — LOW (ref 13–17)
HGB BLD-MCNC: 11.8 G/DL — LOW (ref 13–17)
IMM GRANULOCYTES NFR BLD AUTO: 1.3 % — HIGH (ref 0–0.9)
IMM GRANULOCYTES NFR BLD AUTO: 1.3 % — HIGH (ref 0–0.9)
LYMPHOCYTES # BLD AUTO: 1.55 K/UL — SIGNIFICANT CHANGE UP (ref 1–3.3)
LYMPHOCYTES # BLD AUTO: 1.55 K/UL — SIGNIFICANT CHANGE UP (ref 1–3.3)
LYMPHOCYTES # BLD AUTO: 13.8 % — SIGNIFICANT CHANGE UP (ref 13–44)
LYMPHOCYTES # BLD AUTO: 13.8 % — SIGNIFICANT CHANGE UP (ref 13–44)
MCHC RBC-ENTMCNC: 30.6 G/DL — LOW (ref 32–36)
MCHC RBC-ENTMCNC: 30.6 G/DL — LOW (ref 32–36)
MCHC RBC-ENTMCNC: 30.6 PG — SIGNIFICANT CHANGE UP (ref 27–34)
MCHC RBC-ENTMCNC: 30.6 PG — SIGNIFICANT CHANGE UP (ref 27–34)
MCV RBC AUTO: 100 FL — SIGNIFICANT CHANGE UP (ref 80–100)
MCV RBC AUTO: 100 FL — SIGNIFICANT CHANGE UP (ref 80–100)
MONOCYTES # BLD AUTO: 0.95 K/UL — HIGH (ref 0–0.9)
MONOCYTES # BLD AUTO: 0.95 K/UL — HIGH (ref 0–0.9)
MONOCYTES NFR BLD AUTO: 8.5 % — SIGNIFICANT CHANGE UP (ref 2–14)
MONOCYTES NFR BLD AUTO: 8.5 % — SIGNIFICANT CHANGE UP (ref 2–14)
NEUTROPHILS # BLD AUTO: 8.53 K/UL — HIGH (ref 1.8–7.4)
NEUTROPHILS # BLD AUTO: 8.53 K/UL — HIGH (ref 1.8–7.4)
NEUTROPHILS NFR BLD AUTO: 75.9 % — SIGNIFICANT CHANGE UP (ref 43–77)
NEUTROPHILS NFR BLD AUTO: 75.9 % — SIGNIFICANT CHANGE UP (ref 43–77)
NRBC # BLD: 0 /100 WBCS — SIGNIFICANT CHANGE UP (ref 0–0)
NRBC # BLD: 0 /100 WBCS — SIGNIFICANT CHANGE UP (ref 0–0)
PLATELET # BLD AUTO: 186 K/UL — SIGNIFICANT CHANGE UP (ref 150–400)
PLATELET # BLD AUTO: 186 K/UL — SIGNIFICANT CHANGE UP (ref 150–400)
RBC # BLD: 3.85 M/UL — LOW (ref 4.2–5.8)
RBC # BLD: 3.85 M/UL — LOW (ref 4.2–5.8)
RBC # FLD: 17 % — HIGH (ref 10.3–14.5)
RBC # FLD: 17 % — HIGH (ref 10.3–14.5)
WBC # BLD: 11.23 K/UL — HIGH (ref 3.8–10.5)
WBC # BLD: 11.23 K/UL — HIGH (ref 3.8–10.5)
WBC # FLD AUTO: 11.23 K/UL — HIGH (ref 3.8–10.5)
WBC # FLD AUTO: 11.23 K/UL — HIGH (ref 3.8–10.5)

## 2024-01-09 PROCEDURE — 99213 OFFICE O/P EST LOW 20 MIN: CPT

## 2024-01-10 NOTE — ASSESSMENT
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 Was in remission with well compensated hemolytic anemia on low dose prednisone, but relapsed after tapered to 3 mg daily. 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  Care discussed with Dr Rosario. Hgb is 11.8 today, will continue Prednisone 15 mg daily.    Plan: Prednisone 15 mg daily PCP prophylaxis as per Cardiology- is taking Bactrim CMP, LDH Tacrolimus/ ASA per Cardiology. Folic acid To ER prn RTC in one month.

## 2024-01-10 NOTE — HISTORY OF PRESENT ILLNESS
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 Continues to feel stronger and is more active.   Denies fatigue, fevers, chills, night sweats, palpitations, pain, bleeding, edema, LOBATO. Is being followed by cardiac transplant team.  Has full time aide now. No longer using walker.   Had flu, COVID and pneumonia shots.

## 2024-01-10 NOTE — PHYSICAL EXAM
[Restricted in physically strenuous activity but ambulatory and able to carry out work of a light or sedentary nature] : Status 1- Restricted in physically strenuous activity but ambulatory and able to carry out work of a light or sedentary nature, e.g., light house work, office work [Normal] : affect appropriate [de-identified] : RRR. S1S2 normal. Gr 2/6 holosystolic and holodiastolic murmur LLSB. No gallops.

## 2024-01-11 ENCOUNTER — RX RENEWAL (OUTPATIENT)
Age: 74
End: 2024-01-11

## 2024-01-11 LAB
ALBUMIN SERPL ELPH-MCNC: 4 G/DL
ALP BLD-CCNC: 48 U/L
ALT SERPL-CCNC: 12 U/L
ANION GAP SERPL CALC-SCNC: 11 MMOL/L
AST SERPL-CCNC: 16 U/L
BILIRUB SERPL-MCNC: 0.5 MG/DL
BUN SERPL-MCNC: 46 MG/DL
CALCIUM SERPL-MCNC: 8.2 MG/DL
CHLORIDE SERPL-SCNC: 108 MMOL/L
CO2 SERPL-SCNC: 22 MMOL/L
CREAT SERPL-MCNC: 1.64 MG/DL
EGFR: 44 ML/MIN/1.73M2
GLUCOSE SERPL-MCNC: 100 MG/DL
LDH SERPL-CCNC: 314 U/L
POTASSIUM SERPL-SCNC: 5.1 MMOL/L
PROT SERPL-MCNC: 6.1 G/DL
SODIUM SERPL-SCNC: 141 MMOL/L

## 2024-01-11 RX ORDER — PATIROMER 8.4 G/1
8.4 POWDER, FOR SUSPENSION ORAL TWICE DAILY
Qty: 60 | Refills: 3 | Status: ACTIVE | COMMUNITY
Start: 2021-07-30 | End: 1900-01-01

## 2024-01-12 NOTE — PROVIDER CONTACT NOTE (OTHER) - ASSESSMENT
Detail Level: Simple Pt is asymptomatic. 8 beats WCT on tele. MAP:93 Detail Level: Zone Detail Level: Generalized

## 2024-01-22 ENCOUNTER — APPOINTMENT (OUTPATIENT)
Dept: CV DIAGNOSITCS | Facility: HOSPITAL | Age: 74
End: 2024-01-22

## 2024-01-22 ENCOUNTER — OUTPATIENT (OUTPATIENT)
Dept: OUTPATIENT SERVICES | Facility: HOSPITAL | Age: 74
LOS: 1 days | End: 2024-01-22
Payer: MEDICARE

## 2024-01-22 DIAGNOSIS — Z94.1 HEART TRANSPLANT STATUS: ICD-10-CM

## 2024-01-22 DIAGNOSIS — Z94.1 HEART TRANSPLANT STATUS: Chronic | ICD-10-CM

## 2024-01-22 PROCEDURE — 93351 STRESS TTE COMPLETE: CPT | Mod: 26

## 2024-01-22 PROCEDURE — 93017 CV STRESS TEST TRACING ONLY: CPT

## 2024-01-22 PROCEDURE — C8930: CPT

## 2024-01-25 ENCOUNTER — NON-APPOINTMENT (OUTPATIENT)
Age: 74
End: 2024-01-25

## 2024-01-25 ENCOUNTER — INPATIENT (INPATIENT)
Facility: HOSPITAL | Age: 74
LOS: 4 days | Discharge: HOME | DRG: 641 | End: 2024-01-30
Attending: INTERNAL MEDICINE | Admitting: STUDENT IN AN ORGANIZED HEALTH CARE EDUCATION/TRAINING PROGRAM
Payer: MEDICARE

## 2024-01-25 VITALS
OXYGEN SATURATION: 98 % | HEART RATE: 88 BPM | SYSTOLIC BLOOD PRESSURE: 85 MMHG | RESPIRATION RATE: 18 BRPM | DIASTOLIC BLOOD PRESSURE: 57 MMHG | WEIGHT: 171.96 LBS | TEMPERATURE: 98 F

## 2024-01-25 DIAGNOSIS — Z94.1 HEART TRANSPLANT STATUS: Chronic | ICD-10-CM

## 2024-01-25 LAB
ALBUMIN SERPL ELPH-MCNC: 3.6 G/DL — SIGNIFICANT CHANGE UP (ref 3.3–5)
ALP SERPL-CCNC: 58 U/L — SIGNIFICANT CHANGE UP (ref 40–120)
ALT FLD-CCNC: 11 U/L — SIGNIFICANT CHANGE UP (ref 10–45)
ANION GAP SERPL CALC-SCNC: 16 MMOL/L — SIGNIFICANT CHANGE UP (ref 5–17)
APAP SERPL-MCNC: <15 UG/ML — SIGNIFICANT CHANGE UP (ref 10–30)
AST SERPL-CCNC: 25 U/L — SIGNIFICANT CHANGE UP (ref 10–40)
BASE EXCESS BLDV CALC-SCNC: -2.5 MMOL/L — LOW (ref -2–3)
BASOPHILS # BLD AUTO: 0.02 K/UL — SIGNIFICANT CHANGE UP (ref 0–0.2)
BASOPHILS NFR BLD AUTO: 0.2 % — SIGNIFICANT CHANGE UP (ref 0–2)
BILIRUB SERPL-MCNC: 0.8 MG/DL — SIGNIFICANT CHANGE UP (ref 0.2–1.2)
BUN SERPL-MCNC: 49 MG/DL — HIGH (ref 7–23)
CA-I SERPL-SCNC: 1.16 MMOL/L — SIGNIFICANT CHANGE UP (ref 1.15–1.33)
CALCIUM SERPL-MCNC: 9.1 MG/DL — SIGNIFICANT CHANGE UP (ref 8.4–10.5)
CHLORIDE BLDV-SCNC: 96 MMOL/L — SIGNIFICANT CHANGE UP (ref 96–108)
CHLORIDE SERPL-SCNC: 96 MMOL/L — SIGNIFICANT CHANGE UP (ref 96–108)
CO2 BLDV-SCNC: 26 MMOL/L — SIGNIFICANT CHANGE UP (ref 22–26)
CO2 SERPL-SCNC: 22 MMOL/L — SIGNIFICANT CHANGE UP (ref 22–31)
CREAT SERPL-MCNC: 2.51 MG/DL — HIGH (ref 0.5–1.3)
EGFR: 26 ML/MIN/1.73M2 — LOW
EOSINOPHIL # BLD AUTO: 0.01 K/UL — SIGNIFICANT CHANGE UP (ref 0–0.5)
EOSINOPHIL NFR BLD AUTO: 0.1 % — SIGNIFICANT CHANGE UP (ref 0–6)
FLUAV AG NPH QL: SIGNIFICANT CHANGE UP
FLUBV AG NPH QL: SIGNIFICANT CHANGE UP
GAS PNL BLDV: 128 MMOL/L — LOW (ref 136–145)
GAS PNL BLDV: SIGNIFICANT CHANGE UP
GAS PNL BLDV: SIGNIFICANT CHANGE UP
GLUCOSE BLDV-MCNC: 128 MG/DL — HIGH (ref 70–99)
GLUCOSE SERPL-MCNC: 104 MG/DL — HIGH (ref 70–99)
HCO3 BLDV-SCNC: 24 MMOL/L — SIGNIFICANT CHANGE UP (ref 22–29)
HCT VFR BLD CALC: 42.4 % — SIGNIFICANT CHANGE UP (ref 39–50)
HCT VFR BLDA CALC: 36 % — LOW (ref 39–51)
HGB BLD CALC-MCNC: 12.1 G/DL — LOW (ref 12.6–17.4)
HGB BLD-MCNC: 13.1 G/DL — SIGNIFICANT CHANGE UP (ref 13–17)
IMM GRANULOCYTES NFR BLD AUTO: 0.9 % — SIGNIFICANT CHANGE UP (ref 0–0.9)
LACTATE BLDV-MCNC: 3.2 MMOL/L — HIGH (ref 0.5–2)
LYMPHOCYTES # BLD AUTO: 1.18 K/UL — SIGNIFICANT CHANGE UP (ref 1–3.3)
LYMPHOCYTES # BLD AUTO: 9.1 % — LOW (ref 13–44)
MCHC RBC-ENTMCNC: 30.1 PG — SIGNIFICANT CHANGE UP (ref 27–34)
MCHC RBC-ENTMCNC: 30.9 GM/DL — LOW (ref 32–36)
MCV RBC AUTO: 97.5 FL — SIGNIFICANT CHANGE UP (ref 80–100)
MONOCYTES # BLD AUTO: 1.12 K/UL — HIGH (ref 0–0.9)
MONOCYTES NFR BLD AUTO: 8.7 % — SIGNIFICANT CHANGE UP (ref 2–14)
NEUTROPHILS # BLD AUTO: 10.49 K/UL — HIGH (ref 1.8–7.4)
NEUTROPHILS NFR BLD AUTO: 81 % — HIGH (ref 43–77)
NRBC # BLD: 0 /100 WBCS — SIGNIFICANT CHANGE UP (ref 0–0)
NT-PROBNP SERPL-SCNC: 597 PG/ML — HIGH (ref 0–300)
PCO2 BLDV: 51 MMHG — SIGNIFICANT CHANGE UP (ref 42–55)
PH BLDV: 7.29 — LOW (ref 7.32–7.43)
PLATELET # BLD AUTO: 217 K/UL — SIGNIFICANT CHANGE UP (ref 150–400)
PO2 BLDV: 44 MMHG — SIGNIFICANT CHANGE UP (ref 25–45)
POTASSIUM BLDV-SCNC: 5.3 MMOL/L — HIGH (ref 3.5–5.1)
POTASSIUM SERPL-MCNC: 5.2 MMOL/L — SIGNIFICANT CHANGE UP (ref 3.5–5.3)
POTASSIUM SERPL-SCNC: 5.2 MMOL/L — SIGNIFICANT CHANGE UP (ref 3.5–5.3)
PROT SERPL-MCNC: 6.8 G/DL — SIGNIFICANT CHANGE UP (ref 6–8.3)
RBC # BLD: 4.35 M/UL — SIGNIFICANT CHANGE UP (ref 4.2–5.8)
RBC # FLD: 16.6 % — HIGH (ref 10.3–14.5)
RSV RNA NPH QL NAA+NON-PROBE: SIGNIFICANT CHANGE UP
SALICYLATES SERPL-MCNC: <2 MG/DL — LOW (ref 15–30)
SAO2 % BLDV: 67.9 % — SIGNIFICANT CHANGE UP (ref 67–88)
SARS-COV-2 RNA SPEC QL NAA+PROBE: SIGNIFICANT CHANGE UP
SODIUM SERPL-SCNC: 134 MMOL/L — LOW (ref 135–145)
TROPONIN T, HIGH SENSITIVITY RESULT: 31 NG/L — SIGNIFICANT CHANGE UP (ref 0–51)
TROPONIN T, HIGH SENSITIVITY RESULT: 61 NG/L — HIGH (ref 0–51)
WBC # BLD: 12.94 K/UL — HIGH (ref 3.8–10.5)
WBC # FLD AUTO: 12.94 K/UL — HIGH (ref 3.8–10.5)

## 2024-01-25 PROCEDURE — 70450 CT HEAD/BRAIN W/O DYE: CPT | Mod: 26,MA

## 2024-01-25 PROCEDURE — 99285 EMERGENCY DEPT VISIT HI MDM: CPT

## 2024-01-25 PROCEDURE — 74176 CT ABD & PELVIS W/O CONTRAST: CPT | Mod: 26,MA

## 2024-01-25 PROCEDURE — 71045 X-RAY EXAM CHEST 1 VIEW: CPT | Mod: 26

## 2024-01-25 RX ORDER — SODIUM CHLORIDE 9 MG/ML
500 INJECTION INTRAMUSCULAR; INTRAVENOUS; SUBCUTANEOUS ONCE
Refills: 0 | Status: COMPLETED | OUTPATIENT
Start: 2024-01-25 | End: 2024-01-25

## 2024-01-25 RX ORDER — SODIUM CHLORIDE 9 MG/ML
1000 INJECTION INTRAMUSCULAR; INTRAVENOUS; SUBCUTANEOUS
Refills: 0 | Status: DISCONTINUED | OUTPATIENT
Start: 2024-01-25 | End: 2024-01-26

## 2024-01-25 RX ORDER — VANCOMYCIN HCL 1 G
1000 VIAL (EA) INTRAVENOUS ONCE
Refills: 0 | Status: COMPLETED | OUTPATIENT
Start: 2024-01-25 | End: 2024-01-25

## 2024-01-25 RX ORDER — PIPERACILLIN AND TAZOBACTAM 4; .5 G/20ML; G/20ML
3.38 INJECTION, POWDER, LYOPHILIZED, FOR SOLUTION INTRAVENOUS ONCE
Refills: 0 | Status: COMPLETED | OUTPATIENT
Start: 2024-01-25 | End: 2024-01-25

## 2024-01-25 RX ADMIN — PIPERACILLIN AND TAZOBACTAM 200 GRAM(S): 4; .5 INJECTION, POWDER, LYOPHILIZED, FOR SOLUTION INTRAVENOUS at 19:03

## 2024-01-25 RX ADMIN — SODIUM CHLORIDE 500 MILLILITER(S): 9 INJECTION INTRAMUSCULAR; INTRAVENOUS; SUBCUTANEOUS at 21:07

## 2024-01-25 NOTE — ED PROVIDER NOTE - PHYSICAL EXAMINATION
General: NAD. Nontoxic, well appearing. Speaking in full sentences.  HEENT: EOMI, conjunctiva clear, PERRLA.  Small hematoma to the left temple, no active bleeding.  No nystagmus.  No septal hematoma.  No hemotympanum.  Atraumatic oropharynx and dentition.  No evidence of tongue bite.  Neck: supple with full range of motion, no meningitic signs.    Cardiac: tachycardic with blowing murmur, unknown if at baseline 2/2 OHT  Pulmonary: Lungs CTAB with no increased WOB.  Abdominal:  Abdomen is obese, soft, nontender with multiple well-healed incisions and a vertical incision extending from superior aspect of chest down to upper abdomen from heart transplant. No peritoneal signs.  Neurologic: No gross focal sensory or motor deficits. Moves all 4 extremities during encounter. CN II-XII intact.   Musculoskeletal: Strength appropriate in all 4 extremities for age with no limited ROM. No visible deformities or extremity swelling.  Skin: Color appropriate for race. Intact, warm, and well-perfused. No visible lesions or bruising.  Psychiatric: Mood and affect congruent. A&O x4

## 2024-01-25 NOTE — ED ADULT NURSE REASSESSMENT NOTE - NS ED NURSE REASSESS COMMENT FT1
As per Dr. Mckeon, pt ok to eat. pt given ginger ale and crackers.
Patient is a+ox3 but forgetful, sitting up in stretcher. Breathing spontaneous and unlabored on room air. Skin warm dry and of color appropriate for ethnicity. Patient on cardiac monitor, sinus rhythm. Comfort and safety measures in place.
Pt IV infiltrated. Covering RN and other RN attempted 2x without success. Pt refusing to be stuck again and requests USIV to be placed. Dr. Hair made aware.
Report received from previous RN Cat. Pt at this time pending antibiotic administration and CT scan. Will rpt troponin. Pt A&OX4, denying pain at this time.

## 2024-01-25 NOTE — ED PROVIDER NOTE - DIFFERENTIAL DIAGNOSIS
Ddx includes, however, is not limited to: chf, acs, arrhythmia, dehydration, infection, other Differential Diagnosis

## 2024-01-25 NOTE — ED ADULT NURSE NOTE - OBJECTIVE STATEMENT
73 y.o. male brought in from home via EMS for seizure. EMS states that aid at home stated that pt states that he was lowered to the ground for his seizure, pt was laying on the ground and aid states that he hit his head on the cabinet after he was lowered to the ground. pt states that he was feeling fine after but states he is on AC and aid wanted him to come in to get checked out. PMH DVT, HTN, HLD. A&Ox3, vss, NSR on tele, neuro (PERRL 3 b/l, sensations intact bilaterally throughout, AROM of all extremities), denies CP/dizziness/weakness/SOB/urinary symptoms/fevers/chills/N/V/D

## 2024-01-25 NOTE — CONSULT NOTE ADULT - ASSESSMENT
Recommendations  - IVF at 75 cc/hr overnight  - Check orthostatic vitals   - Infectious work-up including blood and urine cultures, CMV PCR  - For immunosuppression - decrease prograf to 2 mg BID (due for dose this evening), continue home prednisone 20 mg  - Check prograf level in AM  - Hold ARB, diuretic and anti-hypertensives     Case discussed with Dr. Damon   Please see attending attestation for final recommendations        Tonny Carver MD  Cardiology Fellow     All Cardiology service information can be found 24/7 on [email or web address], password: [employer] NORMAN with boderline hypotension, suggestive of volume down/possible orthostasis as etiology for his symptom     Recommendations  - IVF at 75 cc/hr overnight  - Check orthostatic vitals   - Infectious work-up including blood and urine cultures, CMV PCR  - For immunosuppression - decrease prograf to 2 mg BID (due for dose this evening), continue home prednisone 20 mg  - Check prograf level in AM  - Hold ARB, diuretic and anti-hypertensives     Case discussed with Dr. Damon   Please see attending attestation for final recommendations        Tonny Carver MD  Cardiology Fellow     All Cardiology service information can be found 24/7 on [email or web address], password: [email or web address] PMH nonischemic cardiomyopathy and chronic systolic heart failure s/p HM2 LVAD in June 2017 complicated by recurrent GI hemorrhage and possible pump thrombosis who underwent heart transplant on 2/23/18 with a hepatitis C positive donor w/ complicated post-transplant course including hemolytic anemia/Grayson's syndrome. He now presents after an episode of light-headedness and ?syncope. He was noted have NORMAN with borderline hypotension, suggestive of volume down/possible orthostasis as etiology for his symptom.     Recommendations  - IVF at 75 cc/hr overnight  - Check orthostatic vitals   - Infectious work-up including blood and urine cultures, CMV PCR  - For immunosuppression - decrease prograf to 2 mg BID (due for dose this evening), continue home prednisone 20 mg  - Check prograf level in AM  - Hold ARB, diuretic and anti-hypertensives   - Tele    Case discussed with Dr. Damon   Please see attending attestation for final recommendations        Tonny Carver MD  Cardiology Fellow     All Cardiology service information can be found 24/7 on [email or web address], password: [email or web address] PMH nonischemic cardiomyopathy and chronic systolic heart failure s/p HM2 LVAD in June 2017 complicated by recurrent GI hemorrhage and possible pump thrombosis who underwent heart transplant on 2/23/18 with a hepatitis C positive donor w/ complicated post-transplant course including hemolytic anemia/Grayson's syndrome. He now presents after an episode of light-headedness and ?syncope. He was noted have NORMAN with borderline hypotension, suggestive of volume down/possible orthostasis as etiology for his symptom. Recent stress TTE (1/22/24) with normal LVEF).    #NICM s/p OHT  #NORMAN    Recommendations  - IVF at 75 cc/hr overnight  - Check orthostatic vitals   - Infectious work-up including blood and urine cultures, CMV PCR  - For immunosuppression - decrease prograf to 2 mg BID (due for dose this evening), continue home prednisone 20 mg  - Check prograf level in AM  - Hold ARB, diuretic and anti-hypertensives   - Tele    Case discussed with Dr. Damon   Please see attending attestation for final recommendations        Tonny Carver MD  Cardiology Fellow     All Cardiology service information can be found 24/7 on [email or web address], password: [email or web address]

## 2024-01-25 NOTE — ED CLERICAL - NS ED CLERK NOTE PRE-ARRIVAL INFORMATION; ADDITIONAL PRE-ARRIVAL INFORMATION
CC/Reason For referral: dizzy at home seizure like activity  Preferred Consultant(if applicable): No  Who admits for you (if needed): Medicine (heart transplant more than 5 years ago)  Do you have documents you would like to fax over?  No  Would you still like to speak to an ED attending? Yes

## 2024-01-25 NOTE — CONSULT NOTE ADULT - ATTENDING COMMENTS
s/p OHT 6 years ago with  stress ECHO on  with normal biv function , multiple co morbidities now admitted for dizziness, pre syncope.     infectious w/u including pan culture and resp viral panel  NORMAN, would recommend IVF  cont tacro/pred  CT head and CT abd /pelvis  admit to floor

## 2024-01-25 NOTE — ED ADULT NURSE NOTE - NSFALLHARMRISKINTERV_ED_ALL_ED
Communicate risk of Fall with Harm to all staff, patient, and family/Provide visual cue: red socks, yellow wristband, yellow gown, etc/Reinforce activity limits and safety measures with patient and family/Bed in lowest position, wheels locked, appropriate side rails in place/Call bell, personal items and telephone in reach/Instruct patient to call for assistance before getting out of bed/chair/stretcher/Non-slip footwear applied when patient is off stretcher/[place] to call system/Physically safe environment - no spills, clutter or unnecessary equipment/Purposeful Proactive Rounding/Room/bathroom lighting operational, light cord in reach

## 2024-01-25 NOTE — ED PROVIDER NOTE - PROGRESS NOTE DETAILS
Called transplant NP, touched base, will proceed with ED workup.  She informed me that patient had a dobutamine stress test within the past week which was normal.  They plan to see patient soon.    Called CT tech in effort to expedite patient CT head. Received call from lab informing me of lactate of 3.2.  With patient's history and no confirmed etiology, with holding IVF due to patient's cardiac history. Minimal leukocytosis, lactate 3.2, patient intermittently hypoxic during speaking, with soft BP, slightly tachycardic, at this time we will treat for sepsis given patient's history and no confirmed etiology of symptoms, with holding IVF due to patient's cardiac history. Unknown baseline, patient with elevated creatinine around 2.5, unknown baseline, last creatinine from July 2023 appears to be around 1.5.  Based on the information that I have, patient with NORMAN, will plan to lightly hydrate with 500 cc fluids, with holding 30 cc per kg due to cardiac history PGY1/Christian, DO: patient with elevated creatinine around 2.5, unknown baseline, last creatinine from July 2023 appears to be around 1.5.  Based on the information that I have, patient with NORMAN, will plan to lightly hydrate with 500 cc fluids, with holding 30 cc per kg due to cardiac history PGY1/Christian, DO: Called transplant NP, touched base, will proceed with ED workup.  She informed me that patient had a dobutamine stress test within the past week which was normal.  They plan to see patient soon.    Called CT tech in effort to expedite patient CT head. PGY1/Christian, DO: Minimal leukocytosis, lactate 3.2, patient intermittently hypoxic during speaking, with soft BP, slightly tachycardic, at this time we will treat for sepsis given patient's history and no confirmed etiology of symptoms, with holding IVF due to patient's cardiac history. Ti John MD PGY2: cards seen pt, rec maintenance fluids, will cont to follow pt. to admit for syncope.

## 2024-01-25 NOTE — CONSULT NOTE ADULT - SUBJECTIVE AND OBJECTIVE BOX
Patient seen and evaluated at bedside    Chief Complaint:    HPI:      PMHx:   CHF (Congestive Heart Failure)    HTN    SVT (Supraventricular Tachycardia)    Non-Ischemic Cardiomyopathy    PAF (paroxysmal atrial fibrillation)    Ventricular fibrillation    H/O prior ablation treatment    GIB (gastrointestinal bleeding)    Hepatitis C virus    DVT of upper extremity (deep vein thrombosis)    Former smoker    HLD (hyperlipidemia)    Knee pain, right    H/O autoimmune hemolytic anemia    H/O hemolytic anemia        PSHx:   AICD (Automatic Cardioverter/Defibrillator) Present    History of Prior Ablation Treatment    Hypertension    Hypertension    Status post left hip replacement    LVAD (left ventricular assist device) present    H/O heart transplant    S/P right heart catheterization        Allergies:  No Known Allergies      Home Meds:    Current Medications:   sodium chloride 0.9% Bolus 500 milliLiter(s) IV Bolus once  sodium chloride 0.9%. 1000 milliLiter(s) IV Continuous <Continuous>  vancomycin  IVPB. 1000 milliGRAM(s) IV Intermittent once      FAMILY HISTORY:  No pertinent family history in first degree relatives        Social History:  Smoking History:  Alcohol Use:  Drug Use:    REVIEW OF SYSTEMS:  CONSTITUTIONAL: No weakness, fevers or chills  EYES/ENT: No visual changes;  No dysphagia  NECK: No pain or stiffness  RESPIRATORY: No cough, wheezing, hemoptysis; No shortness of breath  CARDIOVASCULAR: No chest pain or palpitations; No lower extremity edema  GASTROINTESTINAL: No abdominal or epigastric pain. No nausea, vomiting, or hematemesis; No diarrhea or constipation. No melena or hematochezia.  BACK: No back pain  GENITOURINARY: No dysuria, frequency or hematuria  NEUROLOGICAL: No numbness or weakness  SKIN: No itching, burning, rashes, or lesions   All other review of systems is negative unless indicated above.    Physical Exam:  T(F): 98.1 (01-25), Max: 98.1 (01-25)  HR: 90 (01-25) (88 - 98)  BP: 111/83 (01-25) (85/57 - 111/83)  RR: 20 (01-25)  SpO2: 98% (01-25)  Appearance: No acute distress; well appearing  Eyes:  EOMI  HEENT: Normal oral mucosa  Cardiovascular: RRR, S1, S2, no murmurs, rubs, or gallops; no edema; no JVD  Respiratory: Clear to auscultation bilaterally  Gastrointestinal: soft, non-tender, non-distended  Extremities: no lower extremity edema   Neurologic: Non-focal  Psychiatry: AAOx3, mood & affect appropriate    Cardiovascular Diagnostic Testing:    ECG:     Echo:     Stress Testing:    Cath:    Imaging:    CXR:     Labs: Personally reviewed                        13.1   12.94 )-----------( 217      ( 25 Jan 2024 17:57 )             42.4     01-25    134<L>  |  96  |  49<H>  ----------------------------<  104<H>  5.2   |  22  |  2.51<H>    Ca    9.1      25 Jan 2024 17:57    TPro  6.8  /  Alb  3.6  /  TBili  0.8  /  DBili  x   /  AST  25  /  ALT  11  /  AlkPhos  58  01-25        CARDIAC MARKERS ( 25 Jan 2024 19:38 )  31 ng/L / x     / x     / x     / x     / x      CARDIAC MARKERS ( 25 Jan 2024 17:57 )  61 ng/L / x     / x     / x     / x     / x                       Patient seen and evaluated at bedside    Chief Complaint: Light-headedness    HPI:  Per Dr. Campbell:  "Mr. Baez is a 73 year old man with past medical history of a nonischemic cardiomyopathy and chronic systolic heart failure s/p HM2 LVAD in June 2017 complicated by recurrent GI hemorrhage and possible pump thrombosis who underwent heart transplant on 2/23/18 with a hepatitis C positive donor. His course was complicated by bilateral IJ/subclavian thrombi, a persistent small right sided pleural effusion, as well as acute on chronic renal failure of unclear etiology. In addition, his post-operative course was complicated by graft dysfunction of unclear etiology and persistent C4d positive staining on endomyocardial biopsy with negative DSAs. He developed joshua evidence of graft dysfunction leading to treatment with plasmapheresis, IVIG, and rituximab.?  In the spring 2020, he was admitted with hemolytic anemia of unclear etiology. There were no clear precipitating factors, such as infection. He was treated with prednisone and Rituximab. Given the potential for CNI inhibitors leading to hemolytic anemia, we transitioned him to everolimus and he remained on high dose prednisone. He was subsequently admitted with acute anemia (not hemolytic). He developed severe leukopenia and Klebsiella bacteremia. Following treatment of this, he developed a severe C. diff colitis infection leading to a prolonged recovery. He was weaned to lower dose prednisone and the everolimus was transitioned back to tacrolimus. At the time of discharge he was found to have low levels of CMV viremia and was started back on valganciclovir.  ?  4th annual R/LHC and biopsy (2/28/2022) showed normal hemodynamics EMBX ISHLT Grade 0R, IF C4D 50% +1 staining, and larger LAD.  ?  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 Discharged home and had been following closely with heme/onc.  ?  Pt readmitted 3/3-3/18/23 who presented to [facility] ER on with new onset of chest tightness, found to be in Aflutter/fib. Hospital course c/b by URI w/ human metapneumovirus, normal LV function, acute on chronic kidney injury likely 2/2 hypovolemia, worsening leukocytosis with CT chest c/f PNA and hyperglycemia. Heme/onc consulted for hemolytic anemia management and prednisone dosing reduced to 60mg daily. Endocrine consulted for new onset of hyperglycemia and insulin teaching. Bacterial infection-work up negative to date however had an elevated Fungitell. Treated with broad spectrum abx from 3/3 to 3/8 (zosyn then cefepime). Pt was cleared for discharge home on 3/18.  ?  Pt was re-admitted on who p/w on 6/14/23 recurrent hypoglycemia at home 2/2 poor PO intake and NORMAN SCr 3.1. Endocrine adjusted insulin regimen, pt was hydrated and SCr returned to baseline"    At his last visit with Dr. Campbell 10/2023, he was doing well and was initiated on losartan.    He presents now with an episode of light-headedness and ?syncope. Patient reports feeling well until he stood from bed. He fel light-headed and states he was helped back to bed by his home health aide with resolution of his symptoms in about 5 minutes. He denies syncope, fall, or hitting his head, though per ED documentation, his HHA describes that he hit his head against and cabinet and had     PMHx:   CHF (Congestive Heart Failure)    HTN    SVT (Supraventricular Tachycardia)    Non-Ischemic Cardiomyopathy    PAF (paroxysmal atrial fibrillation)    Ventricular fibrillation    H/O prior ablation treatment    GIB (gastrointestinal bleeding)    Hepatitis C virus    DVT of upper extremity (deep vein thrombosis)    Former smoker    HLD (hyperlipidemia)    Knee pain, right    H/O autoimmune hemolytic anemia    H/O hemolytic anemia        PSHx:   AICD (Automatic Cardioverter/Defibrillator) Present    History of Prior Ablation Treatment    Hypertension    Hypertension    Status post left hip replacement    LVAD (left ventricular assist device) present    H/O heart transplant    S/P right heart catheterization        Allergies:  No Known Allergies      Home Meds:    Current Medications:   sodium chloride 0.9% Bolus 500 milliLiter(s) IV Bolus once  sodium chloride 0.9%. 1000 milliLiter(s) IV Continuous <Continuous>  vancomycin  IVPB. 1000 milliGRAM(s) IV Intermittent once      FAMILY HISTORY:  No pertinent family history in first degree relatives        Social History:  Smoking History:  Alcohol Use:  Drug Use:    REVIEW OF SYSTEMS:  CONSTITUTIONAL: No weakness, fevers or chills  EYES/ENT: No visual changes;  No dysphagia  NECK: No pain or stiffness  RESPIRATORY: No cough, wheezing, hemoptysis; No shortness of breath  CARDIOVASCULAR: No chest pain or palpitations; No lower extremity edema  GASTROINTESTINAL: No abdominal or epigastric pain. No nausea, vomiting, or hematemesis; No diarrhea or constipation. No melena or hematochezia.  BACK: No back pain  GENITOURINARY: No dysuria, frequency or hematuria  NEUROLOGICAL: No numbness or weakness  SKIN: No itching, burning, rashes, or lesions   All other review of systems is negative unless indicated above.    Physical Exam:  T(F): 98.1 (01-25), Max: 98.1 (01-25)  HR: 90 (01-25) (88 - 98)  BP: 111/83 (01-25) (85/57 - 111/83)  RR: 20 (01-25)  SpO2: 98% (01-25)  Appearance: No acute distress; well appearing  Eyes:  EOMI  HEENT: Normal oral mucosa  Cardiovascular: RRR, S1, S2, no murmurs, rubs, or gallops; no edema; no JVD  Respiratory: Clear to auscultation bilaterally  Gastrointestinal: soft, non-tender, non-distended  Extremities: no lower extremity edema   Neurologic: Non-focal  Psychiatry: AAOx3, mood & affect appropriate    Cardiovascular Diagnostic Testing:    ECG:     Echo:     Stress Testing:    Cath:    Imaging:    CXR:     Labs: Personally reviewed                        13.1   12.94 )-----------( 217      ( 25 Jan 2024 17:57 )             42.4     01-25    134<L>  |  96  |  49<H>  ----------------------------<  104<H>  5.2   |  22  |  2.51<H>    Ca    9.1      25 Jan 2024 17:57    TPro  6.8  /  Alb  3.6  /  TBili  0.8  /  DBili  x   /  AST  25  /  ALT  11  /  AlkPhos  58  01-25        CARDIAC MARKERS ( 25 Jan 2024 19:38 )  31 ng/L / x     / x     / x     / x     / x      CARDIAC MARKERS ( 25 Jan 2024 17:57 )  61 ng/L / x     / x     / x     / x     / x                       Patient seen and evaluated at bedside    Chief Complaint: Light-headedness    HPI:  Per Dr. Campbell:  "Mr. Baez is a 73 year old man with past medical history of a nonischemic cardiomyopathy and chronic systolic heart failure s/p HM2 LVAD in June 2017 complicated by recurrent GI hemorrhage and possible pump thrombosis who underwent heart transplant on 2/23/18 with a hepatitis C positive donor. His course was complicated by bilateral IJ/subclavian thrombi, a persistent small right sided pleural effusion, as well as acute on chronic renal failure of unclear etiology. In addition, his post-operative course was complicated by graft dysfunction of unclear etiology and persistent C4d positive staining on endomyocardial biopsy with negative DSAs. He developed joshua evidence of graft dysfunction leading to treatment with plasmapheresis, IVIG, and rituximab.?  In the spring 2020, he was admitted with hemolytic anemia of unclear etiology. There were no clear precipitating factors, such as infection. He was treated with prednisone and Rituximab. Given the potential for CNI inhibitors leading to hemolytic anemia, we transitioned him to everolimus and he remained on high dose prednisone. He was subsequently admitted with acute anemia (not hemolytic). He developed severe leukopenia and Klebsiella bacteremia. Following treatment of this, he developed a severe C. diff colitis infection leading to a prolonged recovery. He was weaned to lower dose prednisone and the everolimus was transitioned back to tacrolimus. At the time of discharge he was found to have low levels of CMV viremia and was started back on valganciclovir.  ?  4th annual R/LHC and biopsy (2/28/2022) showed normal hemodynamics EMBX ISHLT Grade 0R, IF C4D 50% +1 staining, and larger LAD.  ?  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 Discharged home and had been following closely with heme/onc.  ?  Pt readmitted 3/3-3/18/23 who presented to [facility] ER on with new onset of chest tightness, found to be in Aflutter/fib. Hospital course c/b by URI w/ human metapneumovirus, normal LV function, acute on chronic kidney injury likely 2/2 hypovolemia, worsening leukocytosis with CT chest c/f PNA and hyperglycemia. Heme/onc consulted for hemolytic anemia management and prednisone dosing reduced to 60mg daily. Endocrine consulted for new onset of hyperglycemia and insulin teaching. Bacterial infection-work up negative to date however had an elevated Fungitell. Treated with broad spectrum abx from 3/3 to 3/8 (zosyn then cefepime). Pt was cleared for discharge home on 3/18.  ?  Pt was re-admitted on who p/w on 6/14/23 recurrent hypoglycemia at home 2/2 poor PO intake and NORMAN SCr 3.1. Endocrine adjusted insulin regimen, pt was hydrated and SCr returned to baseline"    At his last visit with Dr. Campbell 10/2023, he was doing well and was initiated on losartan.    He presents now with an episode of light-headedness and ?syncope. Patient reports feeling well until he stood from bed. He fel light-headed and states he was helped back to bed by his home health aide with resolution of his symptoms in about 5 minutes. He denies syncope, fall, or hitting his head, though per ED documentation, his HHA describes that he hit his head against and cabinet and had     PMHx:   CHF (Congestive Heart Failure)  HTN  SVT (Supraventricular Tachycardia)  Non-Ischemic Cardiomyopathy  PAF (paroxysmal atrial fibrillation)  Ventricular fibrillation  H/O prior ablation treatment  GIB (gastrointestinal bleeding)  Hepatitis C virus  DVT of upper extremity (deep vein thrombosis)  Former smoker  HLD (hyperlipidemia)  Knee pain, right  H/O autoimmune hemolytic anemia  H/O hemolytic anemia        PSHx:   AICD (Automatic Cardioverter/Defibrillator) Present  History of Prior Ablation Treatment  Hypertension  Hypertension  Status post left hip replacement  LVAD (left ventricular assist device) present  H/O heart transplant  S/P right heart catheterization      Allergies:  No Known Allergies      Home Meds:    Current Medications:   sodium chloride 0.9% Bolus 500 milliLiter(s) IV Bolus once  sodium chloride 0.9%. 1000 milliLiter(s) IV Continuous <Continuous>  vancomycin  IVPB. 1000 milliGRAM(s) IV Intermittent once      FAMILY HISTORY:  No pertinent family history in first degree relatives        Social History:  Smoking History:  Alcohol Use:  Drug Use:    REVIEW OF SYSTEMS:  CONSTITUTIONAL: No weakness, fevers or chills  EYES/ENT: No visual changes;  No dysphagia  NECK: No pain or stiffness  RESPIRATORY: No cough, wheezing, hemoptysis; No shortness of breath  CARDIOVASCULAR: No chest pain or palpitations; No lower extremity edema  GASTROINTESTINAL: No abdominal or epigastric pain. No nausea, vomiting, or hematemesis; No diarrhea or constipation. No melena or hematochezia.  BACK: No back pain  GENITOURINARY: No dysuria, frequency or hematuria  NEUROLOGICAL: No numbness or weakness  SKIN: No itching, burning, rashes, or lesions   All other review of systems is negative unless indicated above.    Physical Exam:  T(F): 98.1 (01-25), Max: 98.1 (01-25)  HR: 90 (01-25) (88 - 98)  BP: 111/83 (01-25) (85/57 - 111/83)  RR: 20 (01-25)  SpO2: 98% (01-25)  Appearance: No acute distress; well appearing  Eyes:  EOMI  HEENT: Normal oral mucosa  Cardiovascular: RRR, S1, S2, no murmurs, rubs, or gallops; no edema; no JVD  Respiratory: Clear to auscultation bilaterally  Gastrointestinal: soft, non-tender, non-distended  Extremities: no lower extremity edema   Neurologic: Non-focal  Psychiatry: AAOx3, mood & affect appropriate    Cardiovascular Diagnostic Testing:    ECG:     Echo:     Stress Testing:    Cath:    Imaging:    CXR:     Labs: Personally reviewed                        13.1   12.94 )-----------( 217      ( 25 Jan 2024 17:57 )             42.4     01-25    134<L>  |  96  |  49<H>  ----------------------------<  104<H>  5.2   |  22  |  2.51<H>    Ca    9.1      25 Jan 2024 17:57    TPro  6.8  /  Alb  3.6  /  TBili  0.8  /  DBili  x   /  AST  25  /  ALT  11  /  AlkPhos  58  01-25        CARDIAC MARKERS ( 25 Jan 2024 19:38 )  31 ng/L / x     / x     / x     / x     / x      CARDIAC MARKERS ( 25 Jan 2024 17:57 )  61 ng/L / x     / x     / x     / x     / x                       Patient seen and evaluated at bedside    Chief Complaint: Light-headedness    HPI:  Per Dr. Campblel:  "Mr. Baez is a 73 year old man with past medical history of a nonischemic cardiomyopathy and chronic systolic heart failure s/p HM2 LVAD in June 2017 complicated by recurrent GI hemorrhage and possible pump thrombosis who underwent heart transplant on 2/23/18 with a hepatitis C positive donor. His course was complicated by bilateral IJ/subclavian thrombi, a persistent small right sided pleural effusion, as well as acute on chronic renal failure of unclear etiology. In addition, his post-operative course was complicated by graft dysfunction of unclear etiology and persistent C4d positive staining on endomyocardial biopsy with negative DSAs. He developed joshua evidence of graft dysfunction leading to treatment with plasmapheresis, IVIG, and rituximab.?  In the spring 2020, he was admitted with hemolytic anemia of unclear etiology. There were no clear precipitating factors, such as infection. He was treated with prednisone and Rituximab. Given the potential for CNI inhibitors leading to hemolytic anemia, we transitioned him to everolimus and he remained on high dose prednisone. He was subsequently admitted with acute anemia (not hemolytic). He developed severe leukopenia and Klebsiella bacteremia. Following treatment of this, he developed a severe C. diff colitis infection leading to a prolonged recovery. He was weaned to lower dose prednisone and the everolimus was transitioned back to tacrolimus. At the time of discharge he was found to have low levels of CMV viremia and was started back on valganciclovir.  ?  4th annual R/LHC and biopsy (2/28/2022) showed normal hemodynamics EMBX ISHLT Grade 0R, IF C4D 50% +1 staining, and larger LAD.  ?  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 Discharged home and had been following closely with heme/onc.  ?  Pt readmitted 3/3-3/18/23 who presented to [facility] ER on with new onset of chest tightness, found to be in Aflutter/fib. Hospital course c/b by URI w/ human metapneumovirus, normal LV function, acute on chronic kidney injury likely 2/2 hypovolemia, worsening leukocytosis with CT chest c/f PNA and hyperglycemia. Heme/onc consulted for hemolytic anemia management and prednisone dosing reduced to 60mg daily. Endocrine consulted for new onset of hyperglycemia and insulin teaching. Bacterial infection-work up negative to date however had an elevated Fungitell. Treated with broad spectrum abx from 3/3 to 3/8 (zosyn then cefepime). Pt was cleared for discharge home on 3/18.  ?  Pt was re-admitted on who p/w on 6/14/23 recurrent hypoglycemia at home 2/2 poor PO intake and NORMAN SCr 3.1. Endocrine adjusted insulin regimen, pt was hydrated and SCr returned to baseline"    At his last visit with Dr. Campbell 10/2023, he was doing well and was initiated on losartan.    He presents now with an episode of light-headedness and ?syncope. Patient reports feeling well until he stood from bed. He fel light-headed and states he was helped back to bed by his home health aide with resolution of his symptoms in about 5 minutes. He denies syncope, fall, or hitting his head, though per ED documentation, his HHA describes that he hit his head against and cabinet and had seizure like activity.    In the ED, patient has no acute complaints feels bad to baseline.     PMHx:   CHF (Congestive Heart Failure)  HTN  SVT (Supraventricular Tachycardia)  Non-Ischemic Cardiomyopathy  PAF (paroxysmal atrial fibrillation)  Ventricular fibrillation  H/O prior ablation treatment  GIB (gastrointestinal bleeding)  Hepatitis C virus  DVT of upper extremity (deep vein thrombosis)  Former smoker  HLD (hyperlipidemia)  Knee pain, right  H/O autoimmune hemolytic anemia  H/O hemolytic anemia        PSHx:   AICD (Automatic Cardioverter/Defibrillator) Present  History of Prior Ablation Treatment  Hypertension  Hypertension  Status post left hip replacement  LVAD (left ventricular assist device) present  H/O heart transplant  S/P right heart catheterization      Allergies:  No Known Allergies      Home Meds:    Current Medications:   sodium chloride 0.9% Bolus 500 milliLiter(s) IV Bolus once  sodium chloride 0.9%. 1000 milliLiter(s) IV Continuous <Continuous>  vancomycin  IVPB. 1000 milliGRAM(s) IV Intermittent once      FAMILY HISTORY:  No pertinent family history in first degree relatives        Social History:  Smoking History:  Alcohol Use:  Drug Use:    REVIEW OF SYSTEMS:  All other review of systems is negative unless indicated above.    Physical Exam:  T(F): 98.1 (01-25), Max: 98.1 (01-25)  HR: 90 (01-25) (88 - 98)  BP: 111/83 (01-25) (85/57 - 111/83)  RR: 20 (01-25)  SpO2: 98% (01-25)  Appearance: No acute distress; well appearing  Eyes:  EOMI  HEENT: Normal oral mucosa  Cardiovascular: tachy, S1, S2, no murmurs, rubs, or gallops; no edema; no JVD  Respiratory: Clear to auscultation bilaterally  Gastrointestinal: soft, non-tender, non-distended  Extremities: no lower extremity edema   Neurologic: Non-focal  Psychiatry: AAOx3, mood & affect appropriate    Cardiovascular Diagnostic Testing:    Labs: Personally reviewed                        13.1   12.94 )-----------( 217      ( 25 Jan 2024 17:57 )             42.4     01-25    134<L>  |  96  |  49<H>  ----------------------------<  104<H>  5.2   |  22  |  2.51<H>    Ca    9.1      25 Jan 2024 17:57    TPro  6.8  /  Alb  3.6  /  TBili  0.8  /  DBili  x   /  AST  25  /  ALT  11  /  AlkPhos  58  01-25        CARDIAC MARKERS ( 25 Jan 2024 19:38 )  31 ng/L / x     / x     / x     / x     / x      CARDIAC MARKERS ( 25 Jan 2024 17:57 )  61 ng/L / x     / x     / x     / x     / x                       Patient seen and evaluated at bedside    Chief Complaint: Light-headedness    HPI:  Per Dr. Campbell:  "Mr. Baez is a 73 year old man with past medical history of a nonischemic cardiomyopathy and chronic systolic heart failure s/p HM2 LVAD in June 2017 complicated by recurrent GI hemorrhage and possible pump thrombosis who underwent heart transplant on 2/23/18 with a hepatitis C positive donor. His course was complicated by bilateral IJ/subclavian thrombi, a persistent small right sided pleural effusion, as well as acute on chronic renal failure of unclear etiology. In addition, his post-operative course was complicated by graft dysfunction of unclear etiology and persistent C4d positive staining on endomyocardial biopsy with negative DSAs. He developed joshua evidence of graft dysfunction leading to treatment with plasmapheresis, IVIG, and rituximab.?  In the spring 2020, he was admitted with hemolytic anemia of unclear etiology. There were no clear precipitating factors, such as infection. He was treated with prednisone and Rituximab. Given the potential for CNI inhibitors leading to hemolytic anemia, we transitioned him to everolimus and he remained on high dose prednisone. He was subsequently admitted with acute anemia (not hemolytic). He developed severe leukopenia and Klebsiella bacteremia. Following treatment of this, he developed a severe C. diff colitis infection leading to a prolonged recovery. He was weaned to lower dose prednisone and the everolimus was transitioned back to tacrolimus. At the time of discharge he was found to have low levels of CMV viremia and was started back on valganciclovir.  ?  4th annual R/LHC and biopsy (2/28/2022) showed normal hemodynamics EMBX ISHLT Grade 0R, IF C4D 50% +1 staining, and larger LAD.  ?  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 Discharged home and had been following closely with heme/onc.  ?  Pt readmitted 3/3-3/18/23 who presented to [facility] ER on with new onset of chest tightness, found to be in Aflutter/fib. Hospital course c/b by URI w/ human metapneumovirus, normal LV function, acute on chronic kidney injury likely 2/2 hypovolemia, worsening leukocytosis with CT chest c/f PNA and hyperglycemia. Heme/onc consulted for hemolytic anemia management and prednisone dosing reduced to 60mg daily. Endocrine consulted for new onset of hyperglycemia and insulin teaching. Bacterial infection-work up negative to date however had an elevated Fungitell. Treated with broad spectrum abx from 3/3 to 3/8 (zosyn then cefepime). Pt was cleared for discharge home on 3/18.  ?  Pt was re-admitted on who p/w on 6/14/23 recurrent hypoglycemia at home 2/2 poor PO intake and NORMAN SCr 3.1. Endocrine adjusted insulin regimen, pt was hydrated and SCr returned to baseline"    At his last visit with Dr. Campbell 10/2023, he was doing well and was initiated on losartan.    He presents now with an episode of light-headedness and ?syncope. Patient reports feeling well until he stood from bed. He fel light-headed and states he was helped back to bed by his home health aide with resolution of his symptoms in about 5 minutes. He denies syncope, fall, or hitting his head, though per ED documentation, his HHA describes that he hit his head against and cabinet and had seizure like activity.    In the ED, patient has no acute complaints feels bad to baseline.     PMHx:   CHF (Congestive Heart Failure)  HTN  SVT (Supraventricular Tachycardia)  Non-Ischemic Cardiomyopathy  PAF (paroxysmal atrial fibrillation)  Ventricular fibrillation  H/O prior ablation treatment  GIB (gastrointestinal bleeding)  Hepatitis C virus  DVT of upper extremity (deep vein thrombosis)  Former smoker  HLD (hyperlipidemia)  Knee pain, right  H/O autoimmune hemolytic anemia  H/O hemolytic anemia        PSHx:   AICD (Automatic Cardioverter/Defibrillator) Present  History of Prior Ablation Treatment  Hypertension  Hypertension  Status post left hip replacement  LVAD (left ventricular assist device) present  H/O heart transplant  S/P right heart catheterization      Allergies:  No Known Allergies        Current Medications:   sodium chloride 0.9% Bolus 500 milliLiter(s) IV Bolus once  sodium chloride 0.9%. 1000 milliLiter(s) IV Continuous <Continuous>  vancomycin  IVPB. 1000 milliGRAM(s) IV Intermittent once      FAMILY HISTORY:  No pertinent family history in first degree relatives        REVIEW OF SYSTEMS:  All other review of systems is negative unless indicated above.    Physical Exam:  T(F): 98.1 (01-25), Max: 98.1 (01-25)  HR: 90 (01-25) (88 - 98)  BP: 111/83 (01-25) (85/57 - 111/83)  RR: 20 (01-25)  SpO2: 98% (01-25)  Appearance: No acute distress; well appearing  Eyes:  EOMI  HEENT: Normal oral mucosa  Cardiovascular: tachy, S1, S2, no murmurs, rubs, or gallops; no edema; no JVD  Respiratory: Clear to auscultation bilaterally  Gastrointestinal: soft, non-tender, non-distended  Extremities: no lower extremity edema   Neurologic: Non-focal  Psychiatry: AAOx3, mood & affect appropriate    Cardiovascular Diagnostic Testing:    Labs: Personally reviewed                        13.1   12.94 )-----------( 217      ( 25 Jan 2024 17:57 )             42.4     01-25    134<L>  |  96  |  49<H>  ----------------------------<  104<H>  5.2   |  22  |  2.51<H>    Ca    9.1      25 Jan 2024 17:57    TPro  6.8  /  Alb  3.6  /  TBili  0.8  /  DBili  x   /  AST  25  /  ALT  11  /  AlkPhos  58  01-25        CARDIAC MARKERS ( 25 Jan 2024 19:38 )  31 ng/L / x     / x     / x     / x     / x      CARDIAC MARKERS ( 25 Jan 2024 17:57 )  61 ng/L / x     / x     / x     / x     / x            Stress TTE  1/22/24  CONCLUSIONS     1. Left ventricular cavity is normal. Left ventricular systolic function is normal. There are no regional wall motion abnormalities seen.   2. No electrocardiographic evidence of ischemia at or near maximal predicted heart rate.

## 2024-01-25 NOTE — ED PROVIDER NOTE - CLINICAL SUMMARY MEDICAL DECISION MAKING FREE TEXT BOX
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 possible seizure-like activity.  Patient states that he felt dizzy, felt that his vision was blurred and when he walked into his kitchen he recalls hitting his head on the cabinet and is not able to provide any further information.  Per triage note, aide witnessed this episode and described it as seizure like convulsions, patient denies any known history of seizures.  He currently denies any complaints. +ELIQUIS.  During my encounter VS moderately hypotensive however MAP of around 74, SpO2 intermittently dropping to 86% during conversation however rises up to 98% while resting, mildly tachycardic.  On exam patient is overall well-appearing, in NAD, conversational, speaking in full sentences.  EOMI, conjunctiva clear, PERRLA.  Small hematoma to the left temple, no active bleeding.  No nystagmus.  No septal hematoma.  No hemotympanum.  Atraumatic oropharynx and dentition.  No evidence of tongue bite.  Neck is supple with full range of motion, no meningitic signs.  Heart is tachycardic with blowing murmur, unknown if at baseline.  Lungs CTAB with no increased WOB.  Abdomen is obese, soft, nontender with multiple well-healed incisions and a vertical incision extending from superior aspect of chest down to upper abdomen from heart transplant.  No apparent pitting edema.  Radial and DP pulses 2+ bilaterally.  No rashes, skin changes appreciated. DDx ICH vs. space-occupying lesion vs. metabolic/infectious disturbances.  Concern for syncopal workup, unknown etiology, contacted transplant team at this time, they are planning to come see patient. Differential is very broad at this time as reflected in my orders.  I have contacted CT tech to try to expedite patient CT head due to risk of ICH on Eliquis.  Patient will require admission. 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 possible seizure-like activity.  Patient states that he felt dizzy, felt that his vision was blurred and when he walked into his kitchen he recalls hitting his head on the cabinet and is not able to provide any further information.  Per triage note, aide witnessed this episode and described it as seizure like convulsions, patient denies any known history of seizures.  He currently denies any complaints. +ELIQUIS.  During my encounter VS moderately hypotensive however MAP of around 74, SpO2 intermittently dropping to 86% during conversation however rises up to 98% while resting, mildly tachycardic.  On exam patient is overall well-appearing, in NAD, conversational, speaking in full sentences.  EOMI, conjunctiva clear, PERRLA.  Small hematoma to the left temple, no active bleeding.  No nystagmus.  No septal hematoma.  No hemotympanum.  Atraumatic oropharynx and dentition.  No evidence of tongue bite.  Neck is supple with full range of motion, no meningitic signs.  Heart is tachycardic with blowing murmur, unknown if at baseline.  Lungs CTAB with no increased WOB.  Abdomen is obese, soft, nontender with multiple well-healed incisions and a vertical incision extending from superior aspect of chest down to upper abdomen from heart transplant.  Neuro exam with no focal motor/sensory deficits, CN II-XII intact. A&O x4. No apparent pitting edema.  Radial and DP pulses 2+ bilaterally.  No rashes, skin changes appreciated. DDx ICH vs. space-occupying lesion vs. metabolic/infectious disturbances.  Concern for syncopal workup, unknown etiology, contacted transplant team at this time, they are planning to come see patient. Differential is very broad at this time as reflected in my orders.  I have contacted CT tech to try to expedite patient CT head due to risk of ICH on Eliquis.  Patient will require admission. 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 possible seizure-like activity.  Patient states that he felt dizzy, felt that his vision was blurred and when he walked into his kitchen he recalls hitting his head on the cabinet and is not able to provide any further information.  Per triage note, aide witnessed this episode and described it as seizure like convulsions, patient denies any known history of seizures.  He currently denies any complaints. +ELIQUIS.  During my encounter VS moderately hypotensive however MAP of around 74, SpO2 intermittently dropping to 86% during conversation however rises up to 98% while resting, mildly tachycardic.  On exam patient is overall well-appearing, in NAD, conversational, speaking in full sentences.  EOMI, conjunctiva clear, PERRLA.  Small hematoma to the left temple, no active bleeding.  No nystagmus.  No septal hematoma.  No hemotympanum.  Atraumatic oropharynx and dentition.  No evidence of tongue bite.  Neck is supple with full range of motion, no meningitic signs.  Heart is tachycardic with blowing murmur, unknown if at baseline.  Lungs CTAB with no increased WOB.  Abdomen is obese, soft, nontender with multiple well-healed incisions and a vertical incision extending from superior aspect of chest down to upper abdomen from heart transplant.  Neuro exam with no focal motor/sensory deficits, CN II-XII intact. A&O x4. No apparent pitting edema.  Radial and DP pulses 2+ bilaterally.  No rashes, skin changes appreciated. DDx ICH vs. space-occupying lesion vs. metabolic/infectious disturbances.  Concern for syncopal workup, unknown etiology, contacted transplant team at this time, they are planning to come see patient. Differential is very broad at this time as reflected in my orders.  I have contacted CT tech to try to expedite patient CT head due to risk of ICH on Eliquis.  Patient will require admission.    IRVING Mckeon MD: Agree with resident/ACP MDM, assessment and plan as above.

## 2024-01-26 DIAGNOSIS — N17.9 ACUTE KIDNEY FAILURE, UNSPECIFIED: ICD-10-CM

## 2024-01-26 DIAGNOSIS — R56.9 UNSPECIFIED CONVULSIONS: ICD-10-CM

## 2024-01-26 DIAGNOSIS — Z29.9 ENCOUNTER FOR PROPHYLACTIC MEASURES, UNSPECIFIED: ICD-10-CM

## 2024-01-26 DIAGNOSIS — Z94.1 HEART TRANSPLANT STATUS: ICD-10-CM

## 2024-01-26 DIAGNOSIS — D72.829 ELEVATED WHITE BLOOD CELL COUNT, UNSPECIFIED: ICD-10-CM

## 2024-01-26 DIAGNOSIS — I10 ESSENTIAL (PRIMARY) HYPERTENSION: ICD-10-CM

## 2024-01-26 DIAGNOSIS — D84.9 IMMUNODEFICIENCY, UNSPECIFIED: ICD-10-CM

## 2024-01-26 DIAGNOSIS — Z79.2 LONG TERM (CURRENT) USE OF ANTIBIOTICS: ICD-10-CM

## 2024-01-26 DIAGNOSIS — D58.9 HEREDITARY HEMOLYTIC ANEMIA, UNSPECIFIED: ICD-10-CM

## 2024-01-26 DIAGNOSIS — I48.91 UNSPECIFIED ATRIAL FIBRILLATION: ICD-10-CM

## 2024-01-26 DIAGNOSIS — R55 SYNCOPE AND COLLAPSE: ICD-10-CM

## 2024-01-26 LAB
ALBUMIN SERPL ELPH-MCNC: 3.4 G/DL — SIGNIFICANT CHANGE UP (ref 3.3–5)
ALP SERPL-CCNC: 46 U/L — SIGNIFICANT CHANGE UP (ref 40–120)
ALT FLD-CCNC: 12 U/L — SIGNIFICANT CHANGE UP (ref 10–45)
ANION GAP SERPL CALC-SCNC: 14 MMOL/L — SIGNIFICANT CHANGE UP (ref 5–17)
ANION GAP SERPL CALC-SCNC: 14 MMOL/L — SIGNIFICANT CHANGE UP (ref 5–17)
APPEARANCE UR: CLEAR — SIGNIFICANT CHANGE UP
AST SERPL-CCNC: 51 U/L — HIGH (ref 10–40)
BACTERIA # UR AUTO: NEGATIVE /HPF — SIGNIFICANT CHANGE UP
BILIRUB SERPL-MCNC: 1 MG/DL — SIGNIFICANT CHANGE UP (ref 0.2–1.2)
BILIRUB UR-MCNC: NEGATIVE — SIGNIFICANT CHANGE UP
BUN SERPL-MCNC: 53 MG/DL — HIGH (ref 7–23)
BUN SERPL-MCNC: 56 MG/DL — HIGH (ref 7–23)
CALCIUM SERPL-MCNC: 8.2 MG/DL — LOW (ref 8.4–10.5)
CALCIUM SERPL-MCNC: 8.2 MG/DL — LOW (ref 8.4–10.5)
CAST: 39 /LPF — HIGH (ref 0–4)
CHLORIDE SERPL-SCNC: 95 MMOL/L — LOW (ref 96–108)
CHLORIDE SERPL-SCNC: 96 MMOL/L — SIGNIFICANT CHANGE UP (ref 96–108)
CMV DNA CSF QL NAA+PROBE: SIGNIFICANT CHANGE UP IU/ML
CMV DNA SPEC NAA+PROBE-LOG#: SIGNIFICANT CHANGE UP LOG10IU/ML
CO2 SERPL-SCNC: 19 MMOL/L — LOW (ref 22–31)
CO2 SERPL-SCNC: 22 MMOL/L — SIGNIFICANT CHANGE UP (ref 22–31)
COLOR SPEC: YELLOW — SIGNIFICANT CHANGE UP
CREAT SERPL-MCNC: 2.7 MG/DL — HIGH (ref 0.5–1.3)
CREAT SERPL-MCNC: 2.79 MG/DL — HIGH (ref 0.5–1.3)
DIFF PNL FLD: NEGATIVE — SIGNIFICANT CHANGE UP
EGFR: 23 ML/MIN/1.73M2 — LOW
EGFR: 24 ML/MIN/1.73M2 — LOW
FINE GRAN CASTS #/AREA URNS AUTO: PRESENT
GLUCOSE BLDC GLUCOMTR-MCNC: 137 MG/DL — HIGH (ref 70–99)
GLUCOSE BLDC GLUCOMTR-MCNC: 147 MG/DL — HIGH (ref 70–99)
GLUCOSE BLDC GLUCOMTR-MCNC: 181 MG/DL — HIGH (ref 70–99)
GLUCOSE SERPL-MCNC: 114 MG/DL — HIGH (ref 70–99)
GLUCOSE SERPL-MCNC: 117 MG/DL — HIGH (ref 70–99)
GLUCOSE UR QL: NEGATIVE MG/DL — SIGNIFICANT CHANGE UP
HCT VFR BLD CALC: 41.7 % — SIGNIFICANT CHANGE UP (ref 39–50)
HGB BLD-MCNC: 13 G/DL — SIGNIFICANT CHANGE UP (ref 13–17)
KETONES UR-MCNC: ABNORMAL MG/DL
LEUKOCYTE ESTERASE UR-ACNC: NEGATIVE — SIGNIFICANT CHANGE UP
MAGNESIUM SERPL-MCNC: 2 MG/DL — SIGNIFICANT CHANGE UP (ref 1.6–2.6)
MCHC RBC-ENTMCNC: 30 PG — SIGNIFICANT CHANGE UP (ref 27–34)
MCHC RBC-ENTMCNC: 31.2 GM/DL — LOW (ref 32–36)
MCV RBC AUTO: 96.3 FL — SIGNIFICANT CHANGE UP (ref 80–100)
NITRITE UR-MCNC: NEGATIVE — SIGNIFICANT CHANGE UP
NRBC # BLD: 0 /100 WBCS — SIGNIFICANT CHANGE UP (ref 0–0)
PH UR: 5 — SIGNIFICANT CHANGE UP (ref 5–8)
PHOSPHATE SERPL-MCNC: 3.3 MG/DL — SIGNIFICANT CHANGE UP (ref 2.5–4.5)
PLATELET # BLD AUTO: 206 K/UL — SIGNIFICANT CHANGE UP (ref 150–400)
POTASSIUM SERPL-MCNC: 4.2 MMOL/L — SIGNIFICANT CHANGE UP (ref 3.5–5.3)
POTASSIUM SERPL-MCNC: 6.9 MMOL/L — CRITICAL HIGH (ref 3.5–5.3)
POTASSIUM SERPL-SCNC: 4.2 MMOL/L — SIGNIFICANT CHANGE UP (ref 3.5–5.3)
POTASSIUM SERPL-SCNC: 6.9 MMOL/L — CRITICAL HIGH (ref 3.5–5.3)
PROT SERPL-MCNC: 7 G/DL — SIGNIFICANT CHANGE UP (ref 6–8.3)
PROT UR-MCNC: NEGATIVE MG/DL — SIGNIFICANT CHANGE UP
RBC # BLD: 4.33 M/UL — SIGNIFICANT CHANGE UP (ref 4.2–5.8)
RBC # FLD: 16.8 % — HIGH (ref 10.3–14.5)
RBC CASTS # UR COMP ASSIST: 1 /HPF — SIGNIFICANT CHANGE UP (ref 0–4)
REVIEW: SIGNIFICANT CHANGE UP
SODIUM SERPL-SCNC: 129 MMOL/L — LOW (ref 135–145)
SODIUM SERPL-SCNC: 131 MMOL/L — LOW (ref 135–145)
SP GR SPEC: 1.02 — SIGNIFICANT CHANGE UP (ref 1–1.03)
SQUAMOUS # UR AUTO: 2 /HPF — SIGNIFICANT CHANGE UP (ref 0–5)
TACROLIMUS SERPL-MCNC: 6 NG/ML — SIGNIFICANT CHANGE UP
TACROLIMUS SERPL-MCNC: 9.5 NG/ML — SIGNIFICANT CHANGE UP
UROBILINOGEN FLD QL: 1 MG/DL — SIGNIFICANT CHANGE UP (ref 0.2–1)
WBC # BLD: 10.88 K/UL — HIGH (ref 3.8–10.5)
WBC # FLD AUTO: 10.88 K/UL — HIGH (ref 3.8–10.5)
WBC UR QL: 1 /HPF — SIGNIFICANT CHANGE UP (ref 0–5)

## 2024-01-26 PROCEDURE — 99232 SBSQ HOSP IP/OBS MODERATE 35: CPT

## 2024-01-26 PROCEDURE — 99223 1ST HOSP IP/OBS HIGH 75: CPT | Mod: GC,AI

## 2024-01-26 RX ORDER — INSULIN LISPRO 100/ML
VIAL (ML) SUBCUTANEOUS
Refills: 0 | Status: DISCONTINUED | OUTPATIENT
Start: 2024-01-26 | End: 2024-01-30

## 2024-01-26 RX ORDER — LATANOPROST 0.05 MG/ML
1 SOLUTION/ DROPS OPHTHALMIC; TOPICAL
Refills: 0 | DISCHARGE

## 2024-01-26 RX ORDER — SODIUM CHLORIDE 9 MG/ML
1000 INJECTION INTRAMUSCULAR; INTRAVENOUS; SUBCUTANEOUS
Refills: 0 | Status: DISCONTINUED | OUTPATIENT
Start: 2024-01-26 | End: 2024-01-30

## 2024-01-26 RX ORDER — ASPIRIN/CALCIUM CARB/MAGNESIUM 324 MG
81 TABLET ORAL DAILY
Refills: 0 | Status: DISCONTINUED | OUTPATIENT
Start: 2024-01-26 | End: 2024-01-30

## 2024-01-26 RX ORDER — LATANOPROST 0.05 MG/ML
1 SOLUTION/ DROPS OPHTHALMIC; TOPICAL AT BEDTIME
Refills: 0 | Status: DISCONTINUED | OUTPATIENT
Start: 2024-01-26 | End: 2024-01-30

## 2024-01-26 RX ORDER — CHOLECALCIFEROL (VITAMIN D3) 125 MCG
1000 CAPSULE ORAL DAILY
Refills: 0 | Status: DISCONTINUED | OUTPATIENT
Start: 2024-01-26 | End: 2024-01-30

## 2024-01-26 RX ORDER — INSULIN GLARGINE 100 [IU]/ML
8 INJECTION, SOLUTION SUBCUTANEOUS
Qty: 0 | Refills: 0 | DISCHARGE

## 2024-01-26 RX ORDER — LOSARTAN POTASSIUM 100 MG/1
1 TABLET, FILM COATED ORAL
Refills: 0 | DISCHARGE

## 2024-01-26 RX ORDER — TACROLIMUS 5 MG/1
6 CAPSULE ORAL
Refills: 0 | DISCHARGE

## 2024-01-26 RX ORDER — SODIUM CHLORIDE 9 MG/ML
1000 INJECTION, SOLUTION INTRAVENOUS
Refills: 0 | Status: DISCONTINUED | OUTPATIENT
Start: 2024-01-26 | End: 2024-01-30

## 2024-01-26 RX ORDER — INSULIN LISPRO 100/ML
VIAL (ML) SUBCUTANEOUS AT BEDTIME
Refills: 0 | Status: DISCONTINUED | OUTPATIENT
Start: 2024-01-26 | End: 2024-01-30

## 2024-01-26 RX ORDER — DEXTROSE 50 % IN WATER 50 %
25 SYRINGE (ML) INTRAVENOUS ONCE
Refills: 0 | Status: DISCONTINUED | OUTPATIENT
Start: 2024-01-26 | End: 2024-01-30

## 2024-01-26 RX ORDER — DEXTROSE 50 % IN WATER 50 %
15 SYRINGE (ML) INTRAVENOUS ONCE
Refills: 0 | Status: DISCONTINUED | OUTPATIENT
Start: 2024-01-26 | End: 2024-01-30

## 2024-01-26 RX ORDER — PATIROMER 8.4 G/1
25.2 POWDER, FOR SUSPENSION ORAL
Refills: 0 | DISCHARGE

## 2024-01-26 RX ORDER — INSULIN ASPART 100 [IU]/ML
2 INJECTION, SOLUTION SUBCUTANEOUS
Qty: 0 | Refills: 0 | DISCHARGE

## 2024-01-26 RX ORDER — APIXABAN 2.5 MG/1
2.5 TABLET, FILM COATED ORAL
Refills: 0 | Status: DISCONTINUED | OUTPATIENT
Start: 2024-01-26 | End: 2024-01-26

## 2024-01-26 RX ORDER — SODIUM ZIRCONIUM CYCLOSILICATE 10 G/10G
10 POWDER, FOR SUSPENSION ORAL DAILY
Refills: 0 | Status: DISCONTINUED | OUTPATIENT
Start: 2024-01-26 | End: 2024-01-26

## 2024-01-26 RX ORDER — TACROLIMUS 5 MG/1
2 CAPSULE ORAL
Refills: 0 | Status: DISCONTINUED | OUTPATIENT
Start: 2024-01-26 | End: 2024-01-28

## 2024-01-26 RX ORDER — GLUCAGON INJECTION, SOLUTION 0.5 MG/.1ML
1 INJECTION, SOLUTION SUBCUTANEOUS ONCE
Refills: 0 | Status: DISCONTINUED | OUTPATIENT
Start: 2024-01-26 | End: 2024-01-30

## 2024-01-26 RX ORDER — SODIUM ZIRCONIUM CYCLOSILICATE 10 G/10G
10 POWDER, FOR SUSPENSION ORAL
Refills: 0 | Status: DISCONTINUED | OUTPATIENT
Start: 2024-01-26 | End: 2024-01-26

## 2024-01-26 RX ORDER — NYSTATIN 500MM UNIT
500000 POWDER (EA) MISCELLANEOUS THREE TIMES A DAY
Refills: 0 | Status: DISCONTINUED | OUTPATIENT
Start: 2024-01-26 | End: 2024-01-30

## 2024-01-26 RX ORDER — DEXTROSE 50 % IN WATER 50 %
12.5 SYRINGE (ML) INTRAVENOUS ONCE
Refills: 0 | Status: DISCONTINUED | OUTPATIENT
Start: 2024-01-26 | End: 2024-01-30

## 2024-01-26 RX ORDER — ATORVASTATIN CALCIUM 80 MG/1
10 TABLET, FILM COATED ORAL AT BEDTIME
Refills: 0 | Status: DISCONTINUED | OUTPATIENT
Start: 2024-01-26 | End: 2024-01-30

## 2024-01-26 RX ORDER — APIXABAN 2.5 MG/1
5 TABLET, FILM COATED ORAL
Refills: 0 | Status: DISCONTINUED | OUTPATIENT
Start: 2024-01-26 | End: 2024-01-30

## 2024-01-26 RX ORDER — DENOSUMAB 60 MG/ML
60 INJECTION SUBCUTANEOUS
Refills: 0 | DISCHARGE

## 2024-01-26 RX ORDER — CHOLECALCIFEROL (VITAMIN D3) 125 MCG
1 CAPSULE ORAL
Refills: 0 | DISCHARGE

## 2024-01-26 RX ADMIN — Medication 500000 UNIT(S): at 21:21

## 2024-01-26 RX ADMIN — TACROLIMUS 2 MILLIGRAM(S): 5 CAPSULE ORAL at 21:20

## 2024-01-26 RX ADMIN — ATORVASTATIN CALCIUM 10 MILLIGRAM(S): 80 TABLET, FILM COATED ORAL at 21:22

## 2024-01-26 RX ADMIN — SODIUM CHLORIDE 75 MILLILITER(S): 9 INJECTION INTRAMUSCULAR; INTRAVENOUS; SUBCUTANEOUS at 21:43

## 2024-01-26 RX ADMIN — TACROLIMUS 2 MILLIGRAM(S): 5 CAPSULE ORAL at 12:12

## 2024-01-26 RX ADMIN — Medication 1 TABLET(S): at 18:14

## 2024-01-26 RX ADMIN — APIXABAN 5 MILLIGRAM(S): 2.5 TABLET, FILM COATED ORAL at 18:10

## 2024-01-26 RX ADMIN — LATANOPROST 1 DROP(S): 0.05 SOLUTION/ DROPS OPHTHALMIC; TOPICAL at 21:21

## 2024-01-26 RX ADMIN — SODIUM CHLORIDE 75 MILLILITER(S): 9 INJECTION INTRAMUSCULAR; INTRAVENOUS; SUBCUTANEOUS at 18:10

## 2024-01-26 RX ADMIN — Medication 20 MILLIGRAM(S): at 12:11

## 2024-01-26 RX ADMIN — Medication 1000 UNIT(S): at 18:10

## 2024-01-26 RX ADMIN — Medication 81 MILLIGRAM(S): at 18:10

## 2024-01-26 RX ADMIN — Medication 250 MILLIGRAM(S): at 01:00

## 2024-01-26 RX ADMIN — SODIUM CHLORIDE 75 MILLILITER(S): 9 INJECTION INTRAMUSCULAR; INTRAVENOUS; SUBCUTANEOUS at 02:09

## 2024-01-26 NOTE — H&P ADULT - PROBLEM SELECTOR PLAN 1
syncope with LOC upon standing, and headstrike on cabinet, whole body shaking for ~3mins per home aide. No confusion, urinary incontinence, or tongue biting.  - CTH negative  - s/p IVfluids  - negative orthostatics  - cardiology c/s, f/u recs  - holding home anti-hypertensives and diuretics  - q4 neuro checks x 24 hours  - PT eval

## 2024-01-26 NOTE — H&P ADULT - ATTENDING COMMENTS
PAtient seen and evaluated. Patient with extensive PMHx including Heat transplant, paroxysmal atrial fibrillation on eliquis, Sanchez syndrome presented with syncopal epidosde,  Per patient two days prior the the event, he had a  "stomach bug", did not eat for 2 days and had decreased PO intake. DAy of event went to get up, felt dizzy and then does not remember the rest of the event. Per HHA, patient hit head. +shaking. No incontinence, tongue biting.     Patient currently feeling well at time of my examination but still tired. No further dizzineess. No chest pain, sOB, abdominal pain, n/v/d/c.     PE as above (updated)  labs reviewed:   Chronic leukocytosis.   NORMAN on CKD III  +granular casts on UA  CT abd/pelvis without pathology    #Consulvise syncope  -suspect syncope given prodrome, poor po intake  -hold ARB, diuretics.  -fluids for 24 hours  -orthostatics  -if ocurs again, check EEG.   -TTE performed on stress test on 1/22, no need for another.   -PT eval    #NORMAN on CKD  -suspect hemodynamically mediated ATI  -check bladder scan to r/o post-obstructive.   -off ARB, other nephrotoxic  -monitor tacro levels  -hydration as above  -trend.    #Hx cardiac transplant  -appreciate heart failrue. Continue tacrolimus.    #sanchez syndrome  -cotninue steroids    #pAf  -eliquis    D/C once able to ambulate and creaetinine normalizes

## 2024-01-26 NOTE — H&P ADULT - PROBLEM SELECTOR PLAN 2
basline Cr 1.5, now with Cr 2.7, likely iso hypotension and hypovolemia  - s/p IVF  - holding home ARB, diuretics  - bladder scan ordered  - monitor urine output

## 2024-01-26 NOTE — H&P ADULT - ASSESSMENT
72yo M pmhx HTN, HLD, nonischemic cardiomyopathy, chronic systolic heart failure s/p HM2 LVAD (6/2017), s/p heart transplant from Hep. 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

## 2024-01-26 NOTE — PROGRESS NOTE ADULT - PROBLEM SELECTOR PLAN 3
- will continue to adjust tacro as needed, currently on tacro 3 mg PO BID (goal 4-6)  - continue prednisone 15 mg PO QD

## 2024-01-26 NOTE — PROGRESS NOTE ADULT - PROBLEM SELECTOR PLAN 1
- presented from home after having seizure like activity  - head CT negative   - would favor neuro input  - infectious workup underway  - would consult transplant ID

## 2024-01-26 NOTE — PHYSICAL THERAPY INITIAL EVALUATION ADULT - PERTINENT HX OF CURRENT PROBLEM, REHAB EVAL
73M PMH nonischemic cardiomyopathy and chronic systolic heart failure s/p HM2 LVAD in June 2017 complicated by recurrent GI hemorrhage and possible pump thrombosis who underwent heart transplant on 2/23/18 with a hepatitis C positive donor w/ complicated post-transplant course including hemolytic anemia/Grayson's syndrome. He now presents after an episode of light-headedness and syncope episode that was concerning for seizure like activity. Upon arrival he was noted to be hypotensive and have NORMAN. His pressure has now normalized however his renal function and electrolytes are remain abnormal. At this time he is receiving IVF. Was admitted to medicine and will continue to trend his renal function. head CT negative. Dx: Seizure like activity, H/O heart transplant (2018),  Immunosuppression, Prophylactic antibiotic, Hemolytic anemia, NORMAN (acute kidney injury), Atrial fibrillation.

## 2024-01-26 NOTE — H&P ADULT - NSHPREVIEWOFSYSTEMS_GEN_ALL_CORE
REVIEW OF SYSTEMS:  CONSTITUTIONAL: No fever, weight loss, or fatigue  EYES: No eye pain, visual disturbances, or discharge  ENMT:  No difficulty hearing, tinnitus, vertigo; No sinus or throat pain  NECK: No pain or stiffness  RESPIRATORY: No cough, wheezing, chills or hemoptysis; No shortness of breath  CARDIOVASCULAR: No chest pain, palpitations, dizziness, or leg swelling  GASTROINTESTINAL: No abdominal or epigastric pain. No nausea, vomiting, or hematemesis; No diarrhea or constipation. No melena or hematochezia.  GENITOURINARY: No dysuria, frequency, hematuria, or incontinence  NEUROLOGICAL: No headaches, memory loss, loss of strength, numbness, or tremors  SKIN: No itching, burning, rashes, or lesions   MUSCULOSKELETAL: No joint pain or swelling; No muscle, back, or extremity pain  PSYCHIATRIC: No depression, anxiety, mood swings, or difficulty sleeping  HEME/LYMPH: No easy bruising, or bleeding gums  ALLERGY AND IMMUNOLOGIC: No hives or eczema

## 2024-01-26 NOTE — H&P ADULT - PROBLEM SELECTOR PLAN 6
admissions BP 80s/60s  - IVF started  - negative orthostatics  - holding home bumex, losartan, toprol iso syncopal episode  - will slowly resume if pressures increase

## 2024-01-26 NOTE — H&P ADULT - PROBLEM SELECTOR PLAN 4
hx of persistent leukocytosis with negative infectious workup, WBC on admission 12, no fever, or signs & sx of infection  - negative CT A&P  - negative UA  - f/u BCx  - will hold off on antibiotics and monitor

## 2024-01-26 NOTE — PROGRESS NOTE ADULT - PROBLEM SELECTOR PLAN 6
- noted to have bump in renal function  - currently receiving IVF  - trend daily  - may need to consider cardiorenal consult

## 2024-01-26 NOTE — PROGRESS NOTE ADULT - SUBJECTIVE AND OBJECTIVE BOX
ADVANCED HEART FAILURE & TRANSPLANT- PROGRESS NOTE  *To reach the NS1 Team from 8am to 5pm, please call [phone].  ___________________________________________________________________________    Subjective:  - remains in ER    Medications:  predniSONE   Tablet 20 milliGRAM(s) Oral daily  sodium chloride 0.9%. 1000 milliLiter(s) IV Continuous <Continuous>  tacrolimus 2 milliGRAM(s) Oral two times a day        Vitals:  Vital Signs Last 24 Hours  T(C): 36.9 (01-26-24 @ 13:05), Max: 36.9 (01-26-24 @ 01:18)  HR: 105 (01-26-24 @ 13:05) (88 - 105)  BP: 94/63 (01-26-24 @ 13:05) (85/57 - 111/83)  RR: 18 (01-26-24 @ 13:05) (16 - 20)  SpO2: 95% (01-26-24 @ 13:05) (95% - 100%)        I&O's Summary      Physical Exam  General: No distress. Comfortable.  Neck: Neck supple. JVP not elevated. No masses  Chest: Clear to auscultation bilaterally  CV: Normal S1 and S2.  Abdomen: Soft, non-distended, non-tender  Extremities: warm and perfused  Neurology: Alert and oriented times three    Labs:                        13.0   10.88 )-----------( 206      ( 26 Jan 2024 12:13 )             41.7     01-26    129<L>  |  96  |  53<H>  ----------------------------<  114<H>  6.9<HH>   |  19<L>  |  2.70<H>    Ca    8.2<L>      26 Jan 2024 12:13  Phos  3.3     01-26  Mg     2.0     01-26    TPro  7.0  /  Alb  3.4  /  TBili  1.0  /  DBili  x   /  AST  51<H>  /  ALT  12  /  AlkPhos  46  01-26                Lactate, Blood: 1.3 mmol/L (01-26 @ 01:00)

## 2024-01-26 NOTE — PHYSICAL THERAPY INITIAL EVALUATION ADULT - ADDITIONAL COMMENTS
As per the pt, he lives with brother in house with 3-4steps to enter, uses cane primarily for mobility, IND; has HHA 5d/8h and family support.

## 2024-01-26 NOTE — H&P ADULT - HISTORY OF PRESENT ILLNESS
74yo M pmhx HTN, HLD, nonischemic cardiomyopathy, chronic systolic heart failure s/p HM2 LVAD (6/2017), s/p heart transplant from Hep. 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 On 1/25 afternoon, pt experienced blurry vision upon standing and walking, with headstrike on kitchen cabinet, witnessed by home aide. He had ~ 3 minutes of whole body shaking, without tongue biting, eye gaze changes, or urinary incontinence. Patient had no confusion afterwards, but does not remember episode. Pt has hx of seizure @7yo, not on antiseizure medications. Pt reports no sick contacts, no changes in diet or appetite. Pt reports some chronic leg weakness, unchanged. Denies any aura symptoms. Denies any chest pain, palpitations, or shortness of breath prior to the syncopal episode. Denies any headaches, abdominal pain, hematuria, dysuria, changes in bowels, melena or hematochezia.     In the ED, BP 85/57, HR 88, RR 18, afebrile, satting 98% on RA. He was started on fluids. CTH C&P done.

## 2024-01-26 NOTE — H&P ADULT - NSHPLABSRESULTS_GEN_ALL_CORE
13.0   10.88 )-----------( 206      ( 2024 12:13 )             41.7     131<L>  |  95<L>  |  56<H>  ----------------------------<  117<H>  4.2   |  22  |  2.79<H>    Ca    8.2<L>      2024 13:58  Phos  3.3       Mg     2.0         TPro  7.0  /  Alb  3.4  /  TBili  1.0  /  DBili  x   /  AST  51<H>  /  ALT  12  /  AlkPhos  46          Urinalysis Basic - ( 2024 16:22 )    Color: Yellow / Appearance: Clear / S.017 / pH: x  Gluc: x / Ketone: Trace mg/dL  / Bili: Negative / Urobili: 1.0 mg/dL   Blood: x / Protein: Negative mg/dL / Nitrite: Negative   Leuk Esterase: Negative / RBC: 1 /HPF / WBC 1 /HPF   Sq Epi: x / Non Sq Epi: 2 /HPF / Bacteria: Negative /HPF    Lactate Trend   @ 01:00 Lactate:1.3     CAPILLARY BLOOD GLUCOSE  POCT Blood Glucose.: 147 mg/dL (2024 17:22)      CT Abdomen and Pelvis No Cont (24 @ 20:01)  No hydronephrosis or obstructing renal calculus.  No acute pathology    CT Head No Cont (24 @ 18:17)  No acute hemorrhage, edema, or mass effect. No change from 2023.    Xray Chest 1 View- PORTABLE-Urgent (24 @ 20:20)  Bibasilar opacities, not significantly changed since 7/10/2023 and likely   representing atelectasis.

## 2024-01-26 NOTE — H&P ADULT - PROBLEM SELECTOR PLAN 3
s/p OHT 2018  - c/w prednisone 20mg daily  - decrease tacrolimus to 2mg BID  - valtrex 450mg TIW (next dose monday 1/29)  - [facility] STEELEP ppx MWF  - daily tacro levels in AM  - cardiology following

## 2024-01-26 NOTE — H&P ADULT - NSHPPHYSICALEXAM_GEN_ALL_CORE
ICU Vital Signs Last 24 Hrs  T(C): 37.1 (26 Jan 2024 15:00), Max: 37.1 (26 Jan 2024 15:00)  T(F): 98.7 (26 Jan 2024 15:00), Max: 98.7 (26 Jan 2024 15:00)  HR: 103 (26 Jan 2024 15:00) (90 - 105)  BP: 114/78 (26 Jan 2024 15:00) (94/63 - 114/78)  RR: 16 (26 Jan 2024 15:00) (16 - 20)  SpO2: 97% (26 Jan 2024 15:00) (95% - 100%)    O2 Parameters below as of 26 Jan 2024 15:00  Patient On (Oxygen Delivery Method): room air    PHYSICAL EXAM:  GENERAL: NAD, well-developed, sitting up on bed  HEAD:  Atraumatic, Normocephalic  EYES: EOMI, PERRLA, conjunctiva and sclera clear  ENMT: No tonsillar erythema, exudates, or enlargement; Moist mucous membranes  NECK: Supple, Normal thyroid  CHEST/LUNG: CTAB; No rales, rhonchi, wheezing, or rubs  HEART: RRR; No murmurs, rubs, or gallops  ABDOMEN: Soft, NT, ND; Bowel sounds present, well healed vertical chest scar and several horizontal scars s/p OHT - nonereythematous  NERVOUS SYSTEM:  AO X3, Good concentration; Motor Strength 5/5 B/L UE & LE; Sensory intact in b/l UE & LE  EXTREMITIES:  2+ Peripheral Pulses, No clubbing, cyanosis, or edema  SKIN: No rashes or lesions ICU Vital Signs Last 24 Hrs  T(C): 37.1 (26 Jan 2024 15:00), Max: 37.1 (26 Jan 2024 15:00)  T(F): 98.7 (26 Jan 2024 15:00), Max: 98.7 (26 Jan 2024 15:00)  HR: 103 (26 Jan 2024 15:00) (90 - 105)  BP: 114/78 (26 Jan 2024 15:00) (94/63 - 114/78)  RR: 16 (26 Jan 2024 15:00) (16 - 20)  SpO2: 97% (26 Jan 2024 15:00) (95% - 100%)    O2 Parameters below as of 26 Jan 2024 15:00  Patient On (Oxygen Delivery Method): room air    PHYSICAL EXAM:  GENERAL: NAD, well-developed, sitting up on bed  HEAD:  Atraumatic, Normocephalic  EYES: EOMI, PERRLA, conjunctiva and sclera clear  ENMT: No tonsillar erythema, exudates, or enlargement; Moist mucous membranes  NECK: Supple, Normal thyroid  CHEST/LUNG: CTAB; No rales, rhonchi, wheezing, or rubs  HEART: RRR; +diastolic murmur in r 2nd iCS. ?Flow murmur on LSB.  ABDOMEN: Soft, NT, ND; Bowel sounds present, well healed vertical chest scar and several horizontal scars s/p OHT - nonereythematous  NERVOUS SYSTEM:  AO X3, Good concentration; Motor Strength 5/5 B/L UE & LE; Sensory intact in b/l UE & LE  EXTREMITIES:  2+ Peripheral Pulses, No clubbing, cyanosis, or edema  SKIN: No rashes or lesions

## 2024-01-26 NOTE — PROGRESS NOTE ADULT - PROBLEM SELECTOR PLAN 2
- hx of OHT in February 2018  - will consider resuming his Toprol Xl 200 mg PO QD and Losartan 25 mg PO QD within the upcoming days  - continue Bumex 0.5 mg alternating with 1 mg PO daily   - continue ASA 81mg and Pravastatin 20 mg PO QD   - continue home Veltassa 25.2gm daily due to hx of hyperkalemia

## 2024-01-26 NOTE — PATIENT PROFILE ADULT - FALL HARM RISK - HARM RISK INTERVENTIONS
Assistance with ambulation/Assistance OOB with selected safe patient handling equipment/Communicate Risk of Fall with Harm to all staff/Discuss with provider need for PT consult/Monitor for mental status changes/Monitor gait and stability/Provide patient with walking aids - walker, cane, crutches/Reinforce activity limits and safety measures with patient and family/Reorient to person, place and time as needed/Review medications for side effects contributing to fall risk/Sit up slowly, dangle for a short time, stand at bedside before walking/Tailored Fall Risk Interventions/Toileting schedule using arm’s reach rule for commode and bathroom/Use of alarms - bed, chair and/or voice tab/Visual Cue: Yellow wristband and red socks/Bed in lowest position, wheels locked, appropriate side rails in place/Call bell, personal items and telephone in reach/Instruct patient to call for assistance before getting out of bed or chair/Non-slip footwear when patient is out of bed/[place] to call system/Physically safe environment - no spills, clutter or unnecessary equipment/Purposeful Proactive Rounding/Room/bathroom lighting operational, light cord in reach

## 2024-01-27 LAB
A1C WITH ESTIMATED AVERAGE GLUCOSE RESULT: 6.2 % — HIGH (ref 4–5.6)
ANION GAP SERPL CALC-SCNC: 15 MMOL/L — SIGNIFICANT CHANGE UP (ref 5–17)
BASE EXCESS BLDV CALC-SCNC: -3 MMOL/L — LOW (ref -2–3)
BUN SERPL-MCNC: 58 MG/DL — HIGH (ref 7–23)
CA-I SERPL-SCNC: 1.04 MMOL/L — LOW (ref 1.15–1.33)
CALCIUM SERPL-MCNC: 8 MG/DL — LOW (ref 8.4–10.5)
CHLORIDE BLDV-SCNC: 98 MMOL/L — SIGNIFICANT CHANGE UP (ref 96–108)
CHLORIDE SERPL-SCNC: 100 MMOL/L — SIGNIFICANT CHANGE UP (ref 96–108)
CO2 BLDV-SCNC: 25 MMOL/L — SIGNIFICANT CHANGE UP (ref 22–26)
CO2 SERPL-SCNC: 20 MMOL/L — LOW (ref 22–31)
CREAT SERPL-MCNC: 2.55 MG/DL — HIGH (ref 0.5–1.3)
EGFR: 26 ML/MIN/1.73M2 — LOW
ESTIMATED AVERAGE GLUCOSE: 131 MG/DL — HIGH (ref 68–114)
GAS PNL BLDV: 130 MMOL/L — LOW (ref 136–145)
GAS PNL BLDV: SIGNIFICANT CHANGE UP
GAS PNL BLDV: SIGNIFICANT CHANGE UP
GLUCOSE BLDC GLUCOMTR-MCNC: 115 MG/DL — HIGH (ref 70–99)
GLUCOSE BLDC GLUCOMTR-MCNC: 123 MG/DL — HIGH (ref 70–99)
GLUCOSE BLDC GLUCOMTR-MCNC: 157 MG/DL — HIGH (ref 70–99)
GLUCOSE BLDC GLUCOMTR-MCNC: 162 MG/DL — HIGH (ref 70–99)
GLUCOSE BLDV-MCNC: 76 MG/DL — SIGNIFICANT CHANGE UP (ref 70–99)
GLUCOSE SERPL-MCNC: 77 MG/DL — SIGNIFICANT CHANGE UP (ref 70–99)
HCO3 BLDV-SCNC: 24 MMOL/L — SIGNIFICANT CHANGE UP (ref 22–29)
HCT VFR BLDA CALC: 32 % — LOW (ref 39–51)
HGB BLD CALC-MCNC: 10.8 G/DL — LOW (ref 12.6–17.4)
LACTATE BLDV-MCNC: 1.7 MMOL/L — SIGNIFICANT CHANGE UP (ref 0.5–2)
MAGNESIUM SERPL-MCNC: 2 MG/DL — SIGNIFICANT CHANGE UP (ref 1.6–2.6)
PCO2 BLDV: 48 MMHG — SIGNIFICANT CHANGE UP (ref 42–55)
PH BLDV: 7.3 — LOW (ref 7.32–7.43)
PHOSPHATE SERPL-MCNC: 3.5 MG/DL — SIGNIFICANT CHANGE UP (ref 2.5–4.5)
PO2 BLDV: 31 MMHG — SIGNIFICANT CHANGE UP (ref 25–45)
POTASSIUM BLDV-SCNC: 4.6 MMOL/L — SIGNIFICANT CHANGE UP (ref 3.5–5.1)
POTASSIUM SERPL-MCNC: 4.8 MMOL/L — SIGNIFICANT CHANGE UP (ref 3.5–5.3)
POTASSIUM SERPL-SCNC: 4.8 MMOL/L — SIGNIFICANT CHANGE UP (ref 3.5–5.3)
SAO2 % BLDV: 49.3 % — LOW (ref 67–88)
SODIUM SERPL-SCNC: 135 MMOL/L — SIGNIFICANT CHANGE UP (ref 135–145)
TACROLIMUS SERPL-MCNC: 4.6 NG/ML — SIGNIFICANT CHANGE UP

## 2024-01-27 PROCEDURE — 99232 SBSQ HOSP IP/OBS MODERATE 35: CPT | Mod: GC

## 2024-01-27 RX ORDER — ACETAMINOPHEN 500 MG
650 TABLET ORAL ONCE
Refills: 0 | Status: COMPLETED | OUTPATIENT
Start: 2024-01-27 | End: 2024-01-29

## 2024-01-27 RX ORDER — SODIUM CHLORIDE 9 MG/ML
1000 INJECTION, SOLUTION INTRAVENOUS
Refills: 0 | Status: DISCONTINUED | OUTPATIENT
Start: 2024-01-27 | End: 2024-01-27

## 2024-01-27 RX ADMIN — APIXABAN 5 MILLIGRAM(S): 2.5 TABLET, FILM COATED ORAL at 06:01

## 2024-01-27 RX ADMIN — Medication 81 MILLIGRAM(S): at 13:13

## 2024-01-27 RX ADMIN — TACROLIMUS 2 MILLIGRAM(S): 5 CAPSULE ORAL at 21:38

## 2024-01-27 RX ADMIN — APIXABAN 5 MILLIGRAM(S): 2.5 TABLET, FILM COATED ORAL at 18:01

## 2024-01-27 RX ADMIN — LATANOPROST 1 DROP(S): 0.05 SOLUTION/ DROPS OPHTHALMIC; TOPICAL at 21:39

## 2024-01-27 RX ADMIN — Medication 20 MILLIGRAM(S): at 06:00

## 2024-01-27 RX ADMIN — Medication 500000 UNIT(S): at 13:14

## 2024-01-27 RX ADMIN — Medication 1: at 13:13

## 2024-01-27 RX ADMIN — Medication 500000 UNIT(S): at 21:38

## 2024-01-27 RX ADMIN — ATORVASTATIN CALCIUM 10 MILLIGRAM(S): 80 TABLET, FILM COATED ORAL at 21:39

## 2024-01-27 RX ADMIN — Medication 1000 UNIT(S): at 13:13

## 2024-01-27 RX ADMIN — Medication 500000 UNIT(S): at 06:00

## 2024-01-27 RX ADMIN — TACROLIMUS 2 MILLIGRAM(S): 5 CAPSULE ORAL at 09:15

## 2024-01-27 NOTE — PROGRESS NOTE ADULT - PROBLEM SELECTOR PLAN 3
s/p OHT 2018  - c/w prednisone 20mg daily  - decrease tacrolimus to 2mg BID  - valtrex 450mg TIW (next dose monday 1/29)  - bactrim PJP ppx MWF  - daily tacro levels in AM  - cardiology following

## 2024-01-27 NOTE — PROGRESS NOTE ADULT - SUBJECTIVE AND OBJECTIVE BOX
Patient is a 73y old  Male who presents with a chief complaint of syncope (2024 17:54)    INTERVAL HPI/OVERNIGHT EVENTS: Pt feeling much better.No blurry vision, dizziness, or sob. No chest pain or cough.    FAMILY HISTORY:  No pertinent family history in first degree relatives    No Known Allergies    MEDS:  apixaban 5 milliGRAM(s) Oral two times a day  aspirin  chewable 81 milliGRAM(s) Oral daily  atorvastatin 10 milliGRAM(s) Oral at bedtime  cholecalciferol 1000 Unit(s) Oral daily  dextrose 5%. 1000 milliLiter(s) IV Continuous <Continuous>  dextrose 5%. 1000 milliLiter(s) IV Continuous <Continuous>  dextrose 50% Injectable 25 Gram(s) IV Push once  dextrose 50% Injectable 12.5 Gram(s) IV Push once  dextrose 50% Injectable 25 Gram(s) IV Push once  dextrose Oral Gel 15 Gram(s) Oral once PRN  glucagon  Injectable 1 milliGRAM(s) IntraMuscular once  insulin lispro (ADMELOG) corrective regimen sliding scale   SubCutaneous three times a day before meals  insulin lispro (ADMELOG) corrective regimen sliding scale   SubCutaneous at bedtime  latanoprost 0.005% Ophthalmic Solution 1 Drop(s) Both EYES at bedtime  nystatin    Suspension 628835 Unit(s) Oral three times a day  predniSONE   Tablet 20 milliGRAM(s) Oral daily  sodium chloride 0.9%. 1000 milliLiter(s) IV Continuous <Continuous>  tacrolimus 2 milliGRAM(s) Oral <User Schedule>  trimethoprim   80 mG/sulfamethoxazole 400 mG 1 Tablet(s) Oral <User Schedule>      T(C): 36.4 (24 @ 04:50), Max: 37.1 (24 @ 15:00)  HR: 102 (24 @ 04:50) (80 - 105)  BP: 113/71 (24 @ 04:50) (94/63 - 114/78)  RR: 18 (24 @ 04:50) (16 - 18)  SpO2: 96% (24 @ 04:50) (94% - 97%)  Wt(kg): --    PHYSICAL EXAM:  GENERAL: NAD, well-developed, sitting up on bed  HEAD:  Atraumatic, Normocephalic  EYES: EOMI, PERRLA, conjunctiva and sclera clear  ENMT: No tonsillar erythema, exudates, or enlargement; Moist mucous membranes  NECK: Supple, Normal thyroid  CHEST/LUNG: CTAB; No rales, rhonchi, wheezing, or rubs  HEART: RRR; +diastolic murmur in r 2nd iCS. ?Flow murmur on LSB.  ABDOMEN: Soft, NT, ND; Bowel sounds present, well healed vertical chest scar and several horizontal scars s/p OHT - nonereythematous  NERVOUS SYSTEM:  AO X3, Good concentration; Motor Strength 5/5 B/L UE & LE; Sensory intact in b/l UE & LE  EXTREMITIES:  2+ Peripheral Pulses, No clubbing, cyanosis, or edema  SKIN: No rashes or lesions    Consultant(s) Notes Reviewed:  [x ] YES  [ ] NO  Care Discussed with Consultants/Other Providers [ x] YES  [ ] NO    LABS:                        13.0   10.88 )-----------( 206      ( 2024 12:13 )             41.7     131<L>  |  95<L>  |  56<H>  ----------------------------<  117<H>  4.2   |  22  |  2.79<H>    Ca    8.2<L>      2024 13:58  Phos  3.3       Mg     2.0         TPro  7.0  /  Alb  3.4  /  TBili  1.0  /  DBili  x   /  AST  51<H>  /  ALT  12  /  AlkPhos  46      Urinalysis Basic - ( 2024 16:22 )    Color: Yellow / Appearance: Clear / S.017 / pH: x  Gluc: x / Ketone: Trace mg/dL  / Bili: Negative / Urobili: 1.0 mg/dL   Blood: x / Protein: Negative mg/dL / Nitrite: Negative   Leuk Esterase: Negative / RBC: 1 /HPF / WBC 1 /HPF   Sq Epi: x / Non Sq Epi: 2 /HPF / Bacteria: Negative /HPF    Lactate Trend   @ 01:00 Lactate:1.3     CAPILLARY BLOOD GLUCOSE  POCT Blood Glucose.: 147 mg/dL (2024 17:22)    CT Abdomen and Pelvis No Cont (24 @ 20:01)  No hydronephrosis or obstructing renal calculus.  No acute pathology    CT Head No Cont (24 @ 18:17)  No acute hemorrhage, edema, or mass effect. No change from 2023.    Xray Chest 1 View- PORTABLE-Urgent (24 @ 20:20)  Bibasilar opacities, not significantly changed since 7/10/2023 and likely   representing atelectasis.

## 2024-01-28 LAB
ANION GAP SERPL CALC-SCNC: 12 MMOL/L — SIGNIFICANT CHANGE UP (ref 5–17)
BUN SERPL-MCNC: 49 MG/DL — HIGH (ref 7–23)
CALCIUM SERPL-MCNC: 8.3 MG/DL — LOW (ref 8.4–10.5)
CHLORIDE SERPL-SCNC: 104 MMOL/L — SIGNIFICANT CHANGE UP (ref 96–108)
CK MB CFR SERPL CALC: 2.4 NG/ML — SIGNIFICANT CHANGE UP (ref 0–6.7)
CO2 SERPL-SCNC: 22 MMOL/L — SIGNIFICANT CHANGE UP (ref 22–31)
CREAT SERPL-MCNC: 1.83 MG/DL — HIGH (ref 0.5–1.3)
EGFR: 38 ML/MIN/1.73M2 — LOW
GLUCOSE BLDC GLUCOMTR-MCNC: 108 MG/DL — HIGH (ref 70–99)
GLUCOSE BLDC GLUCOMTR-MCNC: 116 MG/DL — HIGH (ref 70–99)
GLUCOSE BLDC GLUCOMTR-MCNC: 140 MG/DL — HIGH (ref 70–99)
GLUCOSE BLDC GLUCOMTR-MCNC: 148 MG/DL — HIGH (ref 70–99)
GLUCOSE SERPL-MCNC: 96 MG/DL — SIGNIFICANT CHANGE UP (ref 70–99)
MAGNESIUM SERPL-MCNC: 2.4 MG/DL — SIGNIFICANT CHANGE UP (ref 1.6–2.6)
PHOSPHATE SERPL-MCNC: 3.1 MG/DL — SIGNIFICANT CHANGE UP (ref 2.5–4.5)
POTASSIUM SERPL-MCNC: 4.3 MMOL/L — SIGNIFICANT CHANGE UP (ref 3.5–5.3)
POTASSIUM SERPL-SCNC: 4.3 MMOL/L — SIGNIFICANT CHANGE UP (ref 3.5–5.3)
SODIUM SERPL-SCNC: 138 MMOL/L — SIGNIFICANT CHANGE UP (ref 135–145)
TROPONIN T, HIGH SENSITIVITY RESULT: 29 NG/L — SIGNIFICANT CHANGE UP (ref 0–51)

## 2024-01-28 PROCEDURE — 93010 ELECTROCARDIOGRAM REPORT: CPT | Mod: 76

## 2024-01-28 PROCEDURE — 99232 SBSQ HOSP IP/OBS MODERATE 35: CPT | Mod: GC

## 2024-01-28 RX ORDER — SODIUM CHLORIDE 9 MG/ML
500 INJECTION INTRAMUSCULAR; INTRAVENOUS; SUBCUTANEOUS
Refills: 0 | Status: DISCONTINUED | OUTPATIENT
Start: 2024-01-28 | End: 2024-01-30

## 2024-01-28 RX ORDER — TACROLIMUS 5 MG/1
3 CAPSULE ORAL EVERY 12 HOURS
Refills: 0 | Status: DISCONTINUED | OUTPATIENT
Start: 2024-01-28 | End: 2024-01-30

## 2024-01-28 RX ORDER — METOPROLOL TARTRATE 50 MG
50 TABLET ORAL DAILY
Refills: 0 | Status: DISCONTINUED | OUTPATIENT
Start: 2024-01-28 | End: 2024-01-29

## 2024-01-28 RX ADMIN — Medication 500000 UNIT(S): at 13:12

## 2024-01-28 RX ADMIN — TACROLIMUS 2 MILLIGRAM(S): 5 CAPSULE ORAL at 09:10

## 2024-01-28 RX ADMIN — SODIUM CHLORIDE 100 MILLILITER(S): 9 INJECTION INTRAMUSCULAR; INTRAVENOUS; SUBCUTANEOUS at 11:51

## 2024-01-28 RX ADMIN — Medication 1 DROP(S): at 11:52

## 2024-01-28 RX ADMIN — Medication 500000 UNIT(S): at 21:57

## 2024-01-28 RX ADMIN — APIXABAN 5 MILLIGRAM(S): 2.5 TABLET, FILM COATED ORAL at 05:51

## 2024-01-28 RX ADMIN — APIXABAN 5 MILLIGRAM(S): 2.5 TABLET, FILM COATED ORAL at 17:10

## 2024-01-28 RX ADMIN — Medication 500000 UNIT(S): at 05:51

## 2024-01-28 RX ADMIN — TACROLIMUS 3 MILLIGRAM(S): 5 CAPSULE ORAL at 17:10

## 2024-01-28 RX ADMIN — ATORVASTATIN CALCIUM 10 MILLIGRAM(S): 80 TABLET, FILM COATED ORAL at 21:57

## 2024-01-28 RX ADMIN — Medication 20 MILLIGRAM(S): at 05:51

## 2024-01-28 RX ADMIN — LATANOPROST 1 DROP(S): 0.05 SOLUTION/ DROPS OPHTHALMIC; TOPICAL at 21:57

## 2024-01-28 RX ADMIN — Medication 81 MILLIGRAM(S): at 11:51

## 2024-01-28 RX ADMIN — Medication 1000 UNIT(S): at 11:52

## 2024-01-28 NOTE — PROGRESS NOTE ADULT - SUBJECTIVE AND OBJECTIVE BOX
PROGRESS NOTE:     Patient is a 73y old  Male who presents with a chief complaint of syncope (27 Jan 2024 07:22)      SUBJECTIVE / OVERNIGHT EVENTS:    ADDITIONAL REVIEW OF SYSTEMS:    MEDICATIONS  (STANDING):  acetaminophen     Tablet .. 650 milliGRAM(s) Oral once  apixaban 5 milliGRAM(s) Oral two times a day  artificial  tears Solution 1 Drop(s) Both EYES daily  aspirin  chewable 81 milliGRAM(s) Oral daily  atorvastatin 10 milliGRAM(s) Oral at bedtime  cholecalciferol 1000 Unit(s) Oral daily  dextrose 5%. 1000 milliLiter(s) (100 mL/Hr) IV Continuous <Continuous>  dextrose 5%. 1000 milliLiter(s) (50 mL/Hr) IV Continuous <Continuous>  dextrose 50% Injectable 25 Gram(s) IV Push once  dextrose 50% Injectable 12.5 Gram(s) IV Push once  dextrose 50% Injectable 25 Gram(s) IV Push once  glucagon  Injectable 1 milliGRAM(s) IntraMuscular once  insulin lispro (ADMELOG) corrective regimen sliding scale   SubCutaneous three times a day before meals  insulin lispro (ADMELOG) corrective regimen sliding scale   SubCutaneous at bedtime  latanoprost 0.005% Ophthalmic Solution 1 Drop(s) Both EYES at bedtime  nystatin    Suspension 677798 Unit(s) Oral three times a day  predniSONE   Tablet 20 milliGRAM(s) Oral daily  sodium chloride 0.9%. 1000 milliLiter(s) (75 mL/Hr) IV Continuous <Continuous>  tacrolimus 2 milliGRAM(s) Oral <User Schedule>  trimethoprim   80 mG/sulfamethoxazole 400 mG 1 Tablet(s) Oral <User Schedule>    MEDICATIONS  (PRN):  dextrose Oral Gel 15 Gram(s) Oral once PRN Blood Glucose LESS THAN 70 milliGRAM(s)/deciliter      CAPILLARY BLOOD GLUCOSE      POCT Blood Glucose.: 157 mg/dL (27 Jan 2024 21:32)  POCT Blood Glucose.: 123 mg/dL (27 Jan 2024 17:33)  POCT Blood Glucose.: 162 mg/dL (27 Jan 2024 12:37)  POCT Blood Glucose.: 115 mg/dL (27 Jan 2024 08:52)    I&O's Summary    27 Jan 2024 07:01  -  28 Jan 2024 07:00  --------------------------------------------------------  IN: 840 mL / OUT: 1370 mL / NET: -530 mL        PHYSICAL EXAM:  Vital Signs Last 24 Hrs  T(C): 36.4 (28 Jan 2024 04:37), Max: 36.6 (27 Jan 2024 20:28)  T(F): 97.6 (28 Jan 2024 04:37), Max: 97.8 (27 Jan 2024 20:28)  HR: 108 (28 Jan 2024 04:37) (108 - 139)  BP: 112/76 (28 Jan 2024 04:37) (112/76 - 131/82)  BP(mean): --  RR: 18 (28 Jan 2024 04:37) (18 - 18)  SpO2: 97% (28 Jan 2024 04:37) (94% - 97%)    Parameters below as of 28 Jan 2024 04:37  Patient On (Oxygen Delivery Method): room air        CONSTITUTIONAL: NAD, well-developed  HEENT: normal conjunctiva, PEERLA  RESPIRATORY: Normal respiratory effort; lungs are clear to auscultation bilaterally  CARDIOVASCULAR: Regular rate and rhythm, normal S1 and S2, no murmur/rub/gallop;   ABDOMEN: No tenderness to palpation at all four quadrants, +BS  MUSCULOSKELETAL no clubbing or cyanosis of digits; no joint swelling or tenderness to palpation  EXTREMITIES No lower extremity edema; Peripheral pulses are 2+ bilaterally  SKIN: well-perfused, no dry skin    LABS:                        13.0   10.88 )-----------( 206      ( 26 Jan 2024 12:13 )             41.7     01-27    135  |  100  |  58<H>  ----------------------------<  77  4.8   |  20<L>  |  2.55<H>    Ca    8.0<L>      27 Jan 2024 07:11  Phos  3.5     01-27  Mg     2.0     01-27    TPro  7.0  /  Alb  3.4  /  TBili  1.0  /  DBili  x   /  AST  51<H>  /  ALT  12  /  AlkPhos  46  01-26          Urinalysis Basic - ( 27 Jan 2024 07:11 )    Color: x / Appearance: x / SG: x / pH: x  Gluc: 77 mg/dL / Ketone: x  / Bili: x / Urobili: x   Blood: x / Protein: x / Nitrite: x   Leuk Esterase: x / RBC: x / WBC x   Sq Epi: x / Non Sq Epi: x / Bacteria: x        Culture - Blood (collected 25 Jan 2024 18:05)  Source: .Blood Blood-Peripheral  Preliminary Report (27 Jan 2024 23:01):    No growth at 48 Hours    Culture - Blood (collected 25 Jan 2024 17:50)  Source: .Blood Blood-Peripheral  Preliminary Report (27 Jan 2024 23:01):    No growth at 48 Hours        RADIOLOGY & ADDITIONAL TESTS:  Results Reviewed:   Imaging Personally Reviewed:  Electrocardiogram Personally Reviewed:    COORDINATION OF CARE:  Care Discussed with Consultants/Other Providers [Y/N]:  Prior or Outpatient Records Reviewed [Y/N]:   PROGRESS NOTE:     Patient is a 73y old  Male who presents with a chief complaint of syncope (27 Jan 2024 07:22)      SUBJECTIVE / OVERNIGHT EVENTS: NORMAN getting better. Pt has no complaints.     ADDITIONAL REVIEW OF SYSTEMS: -ve    MEDICATIONS  (STANDING):  acetaminophen     Tablet .. 650 milliGRAM(s) Oral once  apixaban 5 milliGRAM(s) Oral two times a day  artificial  tears Solution 1 Drop(s) Both EYES daily  aspirin  chewable 81 milliGRAM(s) Oral daily  atorvastatin 10 milliGRAM(s) Oral at bedtime  cholecalciferol 1000 Unit(s) Oral daily  dextrose 5%. 1000 milliLiter(s) (100 mL/Hr) IV Continuous <Continuous>  dextrose 5%. 1000 milliLiter(s) (50 mL/Hr) IV Continuous <Continuous>  dextrose 50% Injectable 25 Gram(s) IV Push once  dextrose 50% Injectable 12.5 Gram(s) IV Push once  dextrose 50% Injectable 25 Gram(s) IV Push once  glucagon  Injectable 1 milliGRAM(s) IntraMuscular once  insulin lispro (ADMELOG) corrective regimen sliding scale   SubCutaneous three times a day before meals  insulin lispro (ADMELOG) corrective regimen sliding scale   SubCutaneous at bedtime  latanoprost 0.005% Ophthalmic Solution 1 Drop(s) Both EYES at bedtime  nystatin    Suspension 848142 Unit(s) Oral three times a day  predniSONE   Tablet 20 milliGRAM(s) Oral daily  sodium chloride 0.9%. 1000 milliLiter(s) (75 mL/Hr) IV Continuous <Continuous>  tacrolimus 2 milliGRAM(s) Oral <User Schedule>  trimethoprim   80 mG/sulfamethoxazole 400 mG 1 Tablet(s) Oral <User Schedule>    MEDICATIONS  (PRN):  dextrose Oral Gel 15 Gram(s) Oral once PRN Blood Glucose LESS THAN 70 milliGRAM(s)/deciliter      CAPILLARY BLOOD GLUCOSE      POCT Blood Glucose.: 157 mg/dL (27 Jan 2024 21:32)  POCT Blood Glucose.: 123 mg/dL (27 Jan 2024 17:33)  POCT Blood Glucose.: 162 mg/dL (27 Jan 2024 12:37)  POCT Blood Glucose.: 115 mg/dL (27 Jan 2024 08:52)    I&O's Summary    27 Jan 2024 07:01  -  28 Jan 2024 07:00  --------------------------------------------------------  IN: 840 mL / OUT: 1370 mL / NET: -530 mL        PHYSICAL EXAM:  Vital Signs Last 24 Hrs  T(C): 36.4 (28 Jan 2024 04:37), Max: 36.6 (27 Jan 2024 20:28)  T(F): 97.6 (28 Jan 2024 04:37), Max: 97.8 (27 Jan 2024 20:28)  HR: 108 (28 Jan 2024 04:37) (108 - 139)  BP: 112/76 (28 Jan 2024 04:37) (112/76 - 131/82)  BP(mean): --  RR: 18 (28 Jan 2024 04:37) (18 - 18)  SpO2: 97% (28 Jan 2024 04:37) (94% - 97%)    Parameters below as of 28 Jan 2024 04:37  Patient On (Oxygen Delivery Method): room air        CONSTITUTIONAL: NAD, well-developed  HEENT: normal conjunctiva, PEERLA  RESPIRATORY: Normal respiratory effort; lungs are clear to auscultation bilaterally  CARDIOVASCULAR: Regular rate and rhythm, normal S1 and S2, no murmur/rub/gallop;   ABDOMEN: No tenderness to palpation at all four quadrants, +BS  MUSCULOSKELETAL no clubbing or cyanosis of digits; no joint swelling or tenderness to palpation  EXTREMITIES No lower extremity edema; Peripheral pulses are 2+ bilaterally  SKIN: well-perfused, no dry skin    LABS:                        13.0   10.88 )-----------( 206      ( 26 Jan 2024 12:13 )             41.7     01-27    135  |  100  |  58<H>  ----------------------------<  77  4.8   |  20<L>  |  2.55<H>    Ca    8.0<L>      27 Jan 2024 07:11  Phos  3.5     01-27  Mg     2.0     01-27    TPro  7.0  /  Alb  3.4  /  TBili  1.0  /  DBili  x   /  AST  51<H>  /  ALT  12  /  AlkPhos  46  01-26          Urinalysis Basic - ( 27 Jan 2024 07:11 )    Color: x / Appearance: x / SG: x / pH: x  Gluc: 77 mg/dL / Ketone: x  / Bili: x / Urobili: x   Blood: x / Protein: x / Nitrite: x   Leuk Esterase: x / RBC: x / WBC x   Sq Epi: x / Non Sq Epi: x / Bacteria: x        Culture - Blood (collected 25 Jan 2024 18:05)  Source: .Blood Blood-Peripheral  Preliminary Report (27 Jan 2024 23:01):    No growth at 48 Hours    Culture - Blood (collected 25 Jan 2024 17:50)  Source: .Blood Blood-Peripheral  Preliminary Report (27 Jan 2024 23:01):    No growth at 48 Hours        RADIOLOGY & ADDITIONAL TESTS:  Results Reviewed:   Imaging Personally Reviewed:  Electrocardiogram Personally Reviewed:    COORDINATION OF CARE:  Care Discussed with Consultants/Other Providers [Y/N]:  Prior or Outpatient Records Reviewed [Y/N]:

## 2024-01-29 ENCOUNTER — TRANSCRIPTION ENCOUNTER (OUTPATIENT)
Age: 74
End: 2024-01-29

## 2024-01-29 LAB
ANION GAP SERPL CALC-SCNC: 11 MMOL/L — SIGNIFICANT CHANGE UP (ref 5–17)
BUN SERPL-MCNC: 42 MG/DL — HIGH (ref 7–23)
CALCIUM SERPL-MCNC: 8.3 MG/DL — LOW (ref 8.4–10.5)
CHLORIDE SERPL-SCNC: 107 MMOL/L — SIGNIFICANT CHANGE UP (ref 96–108)
CO2 SERPL-SCNC: 22 MMOL/L — SIGNIFICANT CHANGE UP (ref 22–31)
CREAT SERPL-MCNC: 1.63 MG/DL — HIGH (ref 0.5–1.3)
EGFR: 44 ML/MIN/1.73M2 — LOW
GLUCOSE BLDC GLUCOMTR-MCNC: 107 MG/DL — HIGH (ref 70–99)
GLUCOSE BLDC GLUCOMTR-MCNC: 113 MG/DL — HIGH (ref 70–99)
GLUCOSE BLDC GLUCOMTR-MCNC: 136 MG/DL — HIGH (ref 70–99)
GLUCOSE BLDC GLUCOMTR-MCNC: 176 MG/DL — HIGH (ref 70–99)
GLUCOSE SERPL-MCNC: 98 MG/DL — SIGNIFICANT CHANGE UP (ref 70–99)
MAGNESIUM SERPL-MCNC: 2.4 MG/DL — SIGNIFICANT CHANGE UP (ref 1.6–2.6)
PHOSPHATE SERPL-MCNC: 1.9 MG/DL — LOW (ref 2.5–4.5)
POTASSIUM SERPL-MCNC: 4.3 MMOL/L — SIGNIFICANT CHANGE UP (ref 3.5–5.3)
POTASSIUM SERPL-SCNC: 4.3 MMOL/L — SIGNIFICANT CHANGE UP (ref 3.5–5.3)
SODIUM SERPL-SCNC: 140 MMOL/L — SIGNIFICANT CHANGE UP (ref 135–145)

## 2024-01-29 PROCEDURE — 99232 SBSQ HOSP IP/OBS MODERATE 35: CPT

## 2024-01-29 PROCEDURE — 99233 SBSQ HOSP IP/OBS HIGH 50: CPT | Mod: GC

## 2024-01-29 RX ORDER — LOSARTAN POTASSIUM 100 MG/1
25 TABLET, FILM COATED ORAL AT BEDTIME
Refills: 0 | Status: DISCONTINUED | OUTPATIENT
Start: 2024-01-29 | End: 2024-01-30

## 2024-01-29 RX ORDER — METOPROLOL TARTRATE 50 MG
100 TABLET ORAL DAILY
Refills: 0 | Status: DISCONTINUED | OUTPATIENT
Start: 2024-01-29 | End: 2024-01-29

## 2024-01-29 RX ORDER — METOPROLOL TARTRATE 50 MG
200 TABLET ORAL DAILY
Refills: 0 | Status: DISCONTINUED | OUTPATIENT
Start: 2024-01-30 | End: 2024-01-30

## 2024-01-29 RX ORDER — LOSARTAN POTASSIUM 100 MG/1
25 TABLET, FILM COATED ORAL DAILY
Refills: 0 | Status: DISCONTINUED | OUTPATIENT
Start: 2024-01-29 | End: 2024-01-29

## 2024-01-29 RX ORDER — VALGANCICLOVIR 450 MG/1
450 TABLET, FILM COATED ORAL ONCE
Refills: 0 | Status: COMPLETED | OUTPATIENT
Start: 2024-01-29 | End: 2024-01-29

## 2024-01-29 RX ORDER — SODIUM ZIRCONIUM CYCLOSILICATE 10 G/10G
5 POWDER, FOR SUSPENSION ORAL DAILY
Refills: 0 | Status: DISCONTINUED | OUTPATIENT
Start: 2024-01-29 | End: 2024-01-30

## 2024-01-29 RX ORDER — METOPROLOL TARTRATE 50 MG
100 TABLET ORAL ONCE
Refills: 0 | Status: COMPLETED | OUTPATIENT
Start: 2024-01-29 | End: 2024-01-29

## 2024-01-29 RX ORDER — SODIUM,POTASSIUM PHOSPHATES 278-250MG
1 POWDER IN PACKET (EA) ORAL EVERY 4 HOURS
Refills: 0 | Status: COMPLETED | OUTPATIENT
Start: 2024-01-29 | End: 2024-01-29

## 2024-01-29 RX ADMIN — APIXABAN 5 MILLIGRAM(S): 2.5 TABLET, FILM COATED ORAL at 06:26

## 2024-01-29 RX ADMIN — Medication 1000 UNIT(S): at 13:02

## 2024-01-29 RX ADMIN — Medication 650 MILLIGRAM(S): at 02:35

## 2024-01-29 RX ADMIN — ATORVASTATIN CALCIUM 10 MILLIGRAM(S): 80 TABLET, FILM COATED ORAL at 22:35

## 2024-01-29 RX ADMIN — Medication 20 MILLIGRAM(S): at 06:26

## 2024-01-29 RX ADMIN — APIXABAN 5 MILLIGRAM(S): 2.5 TABLET, FILM COATED ORAL at 17:57

## 2024-01-29 RX ADMIN — Medication 1 PACKET(S): at 09:24

## 2024-01-29 RX ADMIN — Medication 1 PACKET(S): at 13:05

## 2024-01-29 RX ADMIN — Medication 1 TABLET(S): at 06:26

## 2024-01-29 RX ADMIN — Medication 50 MILLIGRAM(S): at 06:27

## 2024-01-29 RX ADMIN — Medication 500000 UNIT(S): at 13:04

## 2024-01-29 RX ADMIN — Medication 650 MILLIGRAM(S): at 02:05

## 2024-01-29 RX ADMIN — Medication 81 MILLIGRAM(S): at 13:03

## 2024-01-29 RX ADMIN — Medication 1 DROP(S): at 13:04

## 2024-01-29 RX ADMIN — TACROLIMUS 3 MILLIGRAM(S): 5 CAPSULE ORAL at 17:57

## 2024-01-29 RX ADMIN — Medication 500000 UNIT(S): at 22:35

## 2024-01-29 RX ADMIN — LOSARTAN POTASSIUM 25 MILLIGRAM(S): 100 TABLET, FILM COATED ORAL at 22:35

## 2024-01-29 RX ADMIN — Medication 500000 UNIT(S): at 06:26

## 2024-01-29 RX ADMIN — VALGANCICLOVIR 450 MILLIGRAM(S): 450 TABLET, FILM COATED ORAL at 17:18

## 2024-01-29 RX ADMIN — Medication 1: at 13:01

## 2024-01-29 RX ADMIN — Medication 100 MILLIGRAM(S): at 09:23

## 2024-01-29 RX ADMIN — TACROLIMUS 3 MILLIGRAM(S): 5 CAPSULE ORAL at 06:27

## 2024-01-29 RX ADMIN — SODIUM ZIRCONIUM CYCLOSILICATE 5 GRAM(S): 10 POWDER, FOR SUSPENSION ORAL at 22:35

## 2024-01-29 NOTE — PROGRESS NOTE ADULT - PROBLEM SELECTOR PLAN 1
syncope with LOC upon standing, and headstrike on cabinet, whole body shaking for ~3mins per home aide. No confusion, urinary incontinence, or tongue biting.  - CTH negative  - s/p IVfluids  - negative orthostatics  - cardiology c/s, f/u recs  - holding home anti-hypertensives and diuretics  - q4 neuro checks x 24 hours  - PT eval: outpatient PT syncope with LOC upon standing, and headstrike on cabinet, whole body shaking for ~3mins per home aide. No confusion, urinary incontinence, or tongue biting. likely iso hypovolemia given GI sx with poor PO intake x 2 days prior to admission.  - CTH negative  - s/p IVfluids  - negative orthostatics  - neg pharm stress TTE  - cardiology c/s, f/u recs  - resuming home antihypertensives - toprol 200mg daily, losartan 25mg qhs  - holding bumex. will restart home diuretic on discharge  - PT eval: outpatient PT & rolling walker. script provided

## 2024-01-29 NOTE — DISCHARGE NOTE PROVIDER - CARE PROVIDER_API CALL
Marvin Campbell  Cardiovascular Disease  [address] [place] [id number]  Phone: [phone]  Fax: [phone]  Established Patient  Follow Up Time: 1 week

## 2024-01-29 NOTE — PROGRESS NOTE ADULT - SUBJECTIVE AND OBJECTIVE BOX
Patient is a 73y old male who presents with a chief complaint of syncope (28 Jan 2024 07:18)    INTERVAL HPI/OVERNIGHT EVENTS: tachy to 125, EKG with wave inversions on avR 3. Repea EKG negative. Cards c/s, no interventions.    FAMILY HISTORY:  No pertinent family history in first degree relatives    No Known Allergies    MEDS:  apixaban 5 milliGRAM(s) Oral two times a day  artificial  tears Solution 1 Drop(s) Both EYES daily  aspirin  chewable 81 milliGRAM(s) Oral daily  atorvastatin 10 milliGRAM(s) Oral at bedtime  cholecalciferol 1000 Unit(s) Oral daily  dextrose 5%. 1000 milliLiter(s) IV Continuous <Continuous>  dextrose 5%. 1000 milliLiter(s) IV Continuous <Continuous>  dextrose 50% Injectable 25 Gram(s) IV Push once  dextrose 50% Injectable 12.5 Gram(s) IV Push once  dextrose 50% Injectable 25 Gram(s) IV Push once  dextrose Oral Gel 15 Gram(s) Oral once PRN  glucagon  Injectable 1 milliGRAM(s) IntraMuscular once  insulin lispro (ADMELOG) corrective regimen sliding scale   SubCutaneous three times a day before meals  insulin lispro (ADMELOG) corrective regimen sliding scale   SubCutaneous at bedtime  latanoprost 0.005% Ophthalmic Solution 1 Drop(s) Both EYES at bedtime  metoprolol succinate ER 50 milliGRAM(s) Oral daily  nystatin    Suspension 133515 Unit(s) Oral three times a day  predniSONE   Tablet 20 milliGRAM(s) Oral daily  sodium chloride 0.9%. 1000 milliLiter(s) IV Continuous <Continuous>  sodium chloride 0.9%. 500 milliLiter(s) IV Continuous <Continuous>  tacrolimus 3 milliGRAM(s) SubLingual every 12 hours  trimethoprim   80 mG/sulfamethoxazole 400 mG 1 Tablet(s) Oral <User Schedule>      T(C): 36.4 (01-29-24 @ 04:26), Max: 36.6 (01-28-24 @ 22:40)  HR: 131 (01-29-24 @ 04:26) (117 - 131)  BP: 152/97 (01-29-24 @ 04:26) (135/92 - 157/97)  RR: 18 (01-29-24 @ 04:26) (18 - 20)  SpO2: 95% (01-29-24 @ 04:26) (95% - 97%)  Wt(kg): --    PHYSICAL EXAM:TUTIONAL: NAD, well-developed  HEENT: normal conjunctiva, PEERLA  RESPIRATORY: Normal respiratory effort; lungs are clear to auscultation bilaterally  CARDIOVASCULAR: Regular rate and rhythm, normal S1 and S2, no murmur/rub/gallop;   ABDOMEN: No tenderness to palpation at all four quadrants, +BS  MUSCULOSKELETAL no clubbing or cyanosis of digits; no joint swelling or tenderness to palpation  EXTREMITIES No lower extremity edema; Peripheral pulses are 2+ bilaterally  SKIN: well-perfused, no dry skin    Consultant(s) Notes Reviewed:  [x ] YES  [ ] NO  Care Discussed with Consultants/Other Providers [ x] YES  [ ] NO    LABS:      138  |  104  |  49<H>  ----------------------------<  96  4.3   |  22  |  1.83<H>    Ca    8.3<L>      28 Jan 2024 07:28  Phos  3.1     01-28  Mg     2.4     01-28    BCx x2 1/25 - NGTD @ 72 hours    RADIOLOGY & ADDITIONAL TESTS:    CT Abdomen and Pelvis No Cont (01.25.24 @ 20:01)  No hydronephrosis or obstructing renal calculus.  No acute pathology    CT Head No Cont (01.25.24 @ 18:17)  No acute hemorrhage, edema, or mass effect. No change from 5/23/2023.    Xray Chest 1 View- PORTABLE-Urgent (01.25.24 @ 20:20)  Bibasilar opacities, not significantly changed since 7/10/2023 and likely   representing atelectasis. Patient is a 73y old male who presents with a chief complaint of syncope (28 Jan 2024 07:18)    INTERVAL HPI/OVERNIGHT EVENTS:  tachy to 125, EKG with wave inversions on avR 3. Repea EKG negative. Cards c/s, no interventions. Pt denies any complaints. No cough, chest pain, palpitations, or dizziness. had bm yesterday,    FAMILY HISTORY:  No pertinent family history in first degree relatives    No Known Allergies    MEDS:  apixaban 5 milliGRAM(s) Oral two times a day  artificial  tears Solution 1 Drop(s) Both EYES daily  aspirin  chewable 81 milliGRAM(s) Oral daily  atorvastatin 10 milliGRAM(s) Oral at bedtime  cholecalciferol 1000 Unit(s) Oral daily  dextrose 5%. 1000 milliLiter(s) IV Continuous <Continuous>  dextrose 5%. 1000 milliLiter(s) IV Continuous <Continuous>  dextrose 50% Injectable 25 Gram(s) IV Push once  dextrose 50% Injectable 12.5 Gram(s) IV Push once  dextrose 50% Injectable 25 Gram(s) IV Push once  dextrose Oral Gel 15 Gram(s) Oral once PRN  glucagon  Injectable 1 milliGRAM(s) IntraMuscular once  insulin lispro (ADMELOG) corrective regimen sliding scale   SubCutaneous three times a day before meals  insulin lispro (ADMELOG) corrective regimen sliding scale   SubCutaneous at bedtime  latanoprost 0.005% Ophthalmic Solution 1 Drop(s) Both EYES at bedtime  metoprolol succinate ER 50 milliGRAM(s) Oral daily  nystatin    Suspension 554932 Unit(s) Oral three times a day  predniSONE   Tablet 20 milliGRAM(s) Oral daily  sodium chloride 0.9%. 1000 milliLiter(s) IV Continuous <Continuous>  sodium chloride 0.9%. 500 milliLiter(s) IV Continuous <Continuous>  tacrolimus 3 milliGRAM(s) SubLingual every 12 hours  trimethoprim   80 mG/sulfamethoxazole 400 mG 1 Tablet(s) Oral <User Schedule>      T(C): 36.4 (01-29-24 @ 04:26), Max: 36.6 (01-28-24 @ 22:40)  HR: 131 (01-29-24 @ 04:26) (117 - 131)  BP: 152/97 (01-29-24 @ 04:26) (135/92 - 157/97)  RR: 18 (01-29-24 @ 04:26) (18 - 20)  SpO2: 95% (01-29-24 @ 04:26) (95% - 97%)  Wt(kg): --    PHYSICAL EXAM:TUTIONAL: NAD, well-developed  HEENT: normal conjunctiva, PEERLA  RESPIRATORY: Normal respiratory effort; lungs are clear to auscultation bilaterally  CARDIOVASCULAR: Regular rate and rhythm, normal S1 and S2, no murmur/rub/gallop;   ABDOMEN: No tenderness to palpation at all four quadrants, +BS  MUSCULOSKELETAL no clubbing or cyanosis of digits; no joint swelling or tenderness to palpation  EXTREMITIES No lower extremity edema; Peripheral pulses are 2+ bilaterally  SKIN: well-perfused, no dry skin    Consultant(s) Notes Reviewed:  [x ] YES  [ ] NO  Care Discussed with Consultants/Other Providers [ x] YES  [ ] NO    LABS:      138  |  104  |  49<H>  ----------------------------<  96  4.3   |  22  |  1.83<H>    Ca    8.3<L>      28 Jan 2024 07:28  Phos  3.1     01-28  Mg     2.4     01-28    BCx x2 1/25 - NGTD @ 72 hours    RADIOLOGY & ADDITIONAL TESTS:    CT Abdomen and Pelvis No Cont (01.25.24 @ 20:01)  No hydronephrosis or obstructing renal calculus.  No acute pathology    CT Head No Cont (01.25.24 @ 18:17)  No acute hemorrhage, edema, or mass effect. No change from 5/23/2023.    Xray Chest 1 View- PORTABLE-Urgent (01.25.24 @ 20:20)  Bibasilar opacities, not significantly changed since 7/10/2023 and likely   representing atelectasis.

## 2024-01-29 NOTE — DISCHARGE NOTE PROVIDER - CARE PROVIDERS DIRECT ADDRESSES
[email or web address]

## 2024-01-29 NOTE — DISCHARGE NOTE PROVIDER - NSDCCPTREATMENT_GEN_ALL_CORE_FT
PRINCIPAL PROCEDURE  Procedure: Stress echo  Findings and Treatment: Stress ECHO with Dobutamine   CONCLUSIONS   1. Left ventricular cavity is normal. Left ventricular systolic function is ormal. There are no regional wall motion abnormalities seen.   2. No electrocardiographic evidence of ischemia at or near maximal predicted heart rate.        SECONDARY PROCEDURE  Procedure: CT head  Findings and Treatment: FINDINGS:  There is no acute intracranial hemorrhage, edema, or mass effect.  No extra-axial collection.  Ventricles and sulci are normal in size for the patient's age without hydrocephalus. Basal cisterns are patent.  Left maxillary sinus polyp/mucous retention cyst. The remaining visualized paranasal sinuses and mastoid air cells are clear.  Calvarium is intact.  IMPRESSION:  No acute hemorrhage, edema, or mass effect. No change from 5/23/2023.    Procedure: CT abdomen pelvis  Findings and Treatment: FINDINGS:  LOWER CHEST: Right lower lobe linear atelectasis,. Midline sternotomy and epicardial leads are noted.  LIVER: Within normal limits.  BILE DUCTS: Normal caliber.  GALLBLADDER: Cholelithiasis.  SPLEEN: Within normal limits.  PANCREAS: Within normal limits.  ADRENALS: Mild nodular thickening of the left adrenal gland, unchanged. Unremarkable right adrenal gland  KIDNEYS/URETERS: No hydronephrosis or renal calculus. Exophytic left lower pole renal cyst again seen. There are small cyst upper pole right kidney again seen.  BLADDER: Obscured by streak artifact from left hip hardware.  REPRODUCTIVE ORGANS: Partially obscured by streak artifact. The visualized portion is unremarkable.  BOWEL: No bowel obstruction. Appendix is normal.  PERITONEUM:No ascites, pneumoperitoneum, or intraabdominal fluid collection.  VESSELS: Atherosclerotic changes.  RETROPERITONEUM/LYMPH NODES: No lymphadenopathy.  ABDOMINAL WALL: Tiny fat-containing umbilical hernia. Fatty atrophy of the right tensor fascialata muscle  BONES: Left hip arthroplasty. Degenerative changes of the spine, particularly at L2-L3. Dextroscoliosis  IMPRESSION:  No hydronephrosis or obstructing renal calculus.  No acute pathology

## 2024-01-29 NOTE — DISCHARGE NOTE PROVIDER - HOSPITAL COURSE
72yo M pmhx HTN, HLD, nonischemic cardiomyopathy, chronic systolic heart failure s/p HM2 LVAD (6/2017), s/p heart transplant from Hep. 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    In the ED, BP 85/57, afebrile, HR 88, satting 95% on room air. He was started on IV fluids. He had negative infectious workup, including negative blood cultures. He was seen by transplant cardiology, with negative stress TTE and orthostatics. His blood pressure improved, and Cr downtrended. His heart rate uptrended t0 130s with EKG showing sinus tachycardia, and he was restarted on metoprolol succinate. Pt was evaluated by physical therapy for recommends for outpatient PT.    72yo M pmhx HTN, HLD, nonischemic cardiomyopathy, chronic systolic heart failure s/p HM2 LVAD (6/2017), s/p heart transplant from Hep. 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    In the ED, BP 85/57, afebrile, HR 88, satting 95% on room air. He was started on IV fluids. He had negative infectious workup, including negative blood cultures. He was seen by transplant cardiology, with negative stress TTE and orthostatics. His blood pressure improved, and Cr downtrended to baseline. His heart rate uptrended t0 130s with EKG showing sinus tachycardia, and he was restarted on metoprolol succinate and losartan. Pt was evaluated by physical therapy for recommends for outpatient PT.    The patient is afebrile, hemodynamically stable and medically optimized for discharge to home with follow up with Dr. Campbell. On day of discharge, patient is clinically stable with no new exam findings or acute symptoms compared to prior. The patient was seen by the attending physician on the date of discharge and deemed stable and acceptable for discharge. The patient's medication reconciliation (with changes made to chronic medications), follow up appointments, discharge orders, instructions, and significant lab and diagnostic studies are as noted.    Important Medication Changes and Reason:  - decrease Tacrolimus to 2mg BID  - take Bumex 0.5mg daily  - take Valcyte 1 pill on Mondays and Thursdays    Active or Pending Issues Requiring Follow-up:  - please follow up with Dr. Campbell on 2/12/2024    Advanced Directives:   [X] Full code  [ ] DNR  [ ] Hospice       72yo M pmhx HTN, HLD, nonischemic cardiomyopathy, chronic systolic heart failure s/p HM2 LVAD (6/2017), s/p heart transplant from Hep. 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    In the ED, BP 85/57, afebrile, HR 88, satting 95% on room air. He was started on IV fluids. He had negative infectious workup, including negative blood cultures. He was seen by transplant cardiology, with negative stress TTE and orthostatics. His blood pressure improved, and Cr downtrended to baseline. His heart rate uptrended t0 130s with EKG showing sinus tachycardia, and he was restarted on metoprolol succinate and losartan. Pt was evaluated by physical therapy for recommends for outpatient PT.    The patient is afebrile, hemodynamically stable and medically optimized for discharge to home with follow up with Dr. Campbell. On day of discharge, patient is clinically stable with no new exam findings or acute symptoms compared to prior. The patient was seen by the attending physician on the date of discharge and deemed stable and acceptable for discharge. The patient's medication reconciliation (with changes made to chronic medications), follow up appointments, discharge orders, instructions, and significant lab and diagnostic studies are as noted.    Important Medication Changes and Reason:  - decrease Tacrolimus to 2mg BID  - take Bumex 0.5mg daily  - take Valcyte 1 pill on Mondays and Thursdays    Active or Pending Issues Requiring Follow-up:  - please follow up with Dr. Campbell on 2/12/2024  - please have your blood work drawn on 2/5/2024    Advanced Directives:   [X] Full code  [ ] DNR  [ ] Hospice

## 2024-01-29 NOTE — PROGRESS NOTE ADULT - PROBLEM SELECTOR PLAN 3
s/p OHT 2018  - c/w prednisone 20mg daily  - decrease tacrolimus to 2mg BID  - valtrex 450mg TIW (next dose monday 1/29)  - bactrim PJP ppx MWF  - daily tacro levels in AM  - cardiology following s/p OHT 2018  - c/w prednisone 15mg daily  - decrease tacrolimus to 2mg BID  - valtrex 450mg TIW - last dose 1/29  - bactrim PJP ppx MWF  - daily tacro levels in AM  - cardiology following

## 2024-01-29 NOTE — PROGRESS NOTE ADULT - PROBLEM SELECTOR PLAN 6
admissions BP 80s/60s  - IVF started  - negative orthostatics  - holding home bumex, losartan, toprol iso syncopal episode  - will slowly resume if pressures increase admissions BP 80s/60s - resolved  - IVF started  - negative orthostatics  - resuming home losartan and toprol. holding home bumex

## 2024-01-29 NOTE — PROGRESS NOTE ADULT - SUBJECTIVE AND OBJECTIVE BOX
Patient seen and examined at bedside.    Overnight Events: tachycardic overnight up to 130s, in sinus    Review Of Systems: No chest pain, shortness of breath, or palpitations            Current Meds:  apixaban 5 milliGRAM(s) Oral two times a day  artificial  tears Solution 1 Drop(s) Both EYES daily  aspirin  chewable 81 milliGRAM(s) Oral daily  atorvastatin 10 milliGRAM(s) Oral at bedtime  cholecalciferol 1000 Unit(s) Oral daily  dextrose 5%. 1000 milliLiter(s) IV Continuous <Continuous>  dextrose 5%. 1000 milliLiter(s) IV Continuous <Continuous>  dextrose 50% Injectable 25 Gram(s) IV Push once  dextrose 50% Injectable 12.5 Gram(s) IV Push once  dextrose 50% Injectable 25 Gram(s) IV Push once  dextrose Oral Gel 15 Gram(s) Oral once PRN  glucagon  Injectable 1 milliGRAM(s) IntraMuscular once  insulin lispro (ADMELOG) corrective regimen sliding scale   SubCutaneous three times a day before meals  insulin lispro (ADMELOG) corrective regimen sliding scale   SubCutaneous at bedtime  latanoprost 0.005% Ophthalmic Solution 1 Drop(s) Both EYES at bedtime  losartan 25 milliGRAM(s) Oral daily  nystatin    Suspension 550263 Unit(s) Oral three times a day  predniSONE   Tablet 20 milliGRAM(s) Oral daily  sodium chloride 0.9%. 500 milliLiter(s) IV Continuous <Continuous>  sodium chloride 0.9%. 1000 milliLiter(s) IV Continuous <Continuous>  tacrolimus 3 milliGRAM(s) SubLingual every 12 hours  trimethoprim   80 mG/sulfamethoxazole 400 mG 1 Tablet(s) Oral <User Schedule>      Vitals:  T(F): 98.3 (01-29), Max: 98.3 (01-29)  HR: 113 (01-29) (113 - 131)  BP: 143/95 (01-29) (135/92 - 157/97)  RR: 18 (01-29)  SpO2: 95% (01-29)  I&O's Summary    28 Jan 2024 07:01  -  29 Jan 2024 07:00  --------------------------------------------------------  IN: 1070 mL / OUT: 1450 mL / NET: -380 mL        Physical Exam:  General: No distress. Comfortable.  Neck: Neck supple. JVP not elevated. No masses  Chest: Clear to auscultation bilaterally  CV: Normal S1 and S2.  Abdomen: Soft, non-distended, non-tender  Extremities: warm and perfused  Neurology: Alert and oriented times three      01-29    140  |  107  |  42<H>  ----------------------------<  98  4.3   |  22  |  1.63<H>    Ca    8.3<L>      29 Jan 2024 07:30  Phos  1.9     01-29  Mg     2.4     01-29        CARDIAC MARKERS ( 28 Jan 2024 23:06 )  29 ng/L / x     / x     / x     / x     / 2.4 ng/mL  CARDIAC MARKERS ( 25 Jan 2024 19:38 )  31 ng/L / x     / x     / x     / x     / x      CARDIAC MARKERS ( 25 Jan 2024 17:57 )  61 ng/L / x     / x     / x     / x     / x

## 2024-01-29 NOTE — PROGRESS NOTE ADULT - PROBLEM SELECTOR PLAN 1
- presented from home after having seizure like activity  - head CT negative   - likely 2/2 hypotension from dehydration

## 2024-01-29 NOTE — DISCHARGE NOTE PROVIDER - NSDCFUSCHEDAPPT_GEN_ALL_CORE_FT
Marvin Campbell  [facility]  HEARTFAIL [address]  Scheduled Appointment: 01/31/2024    Sujey Lujan  [facility]  INFDISEASE [address]  Scheduled Appointment: 02/06/2024    Tao Rosario  [facility]  [facility]  Scheduled Appointment: 02/07/2024    Ricardo Clemens  [address]  Scheduled Appointment: 02/20/2024     Sujey Lujan  [facility]  INFDISEASE [address]  Scheduled Appointment: 02/06/2024    Tao Rosario  [facility]  [facility]  Scheduled Appointment: 02/07/2024    Marvin Campbell  [facility]  HEARTFAIL [address]  Scheduled Appointment: 02/12/2024    Ricardo Clemens  [address]  Scheduled Appointment: 02/20/2024

## 2024-01-29 NOTE — DISCHARGE NOTE PROVIDER - NSDCMRMEDTOKEN_GEN_ALL_CORE_FT
apixaban 5 mg oral tablet: 1 tab(s) orally every 12 hours  aspirin 81 mg oral tablet, chewable: 1 tab(s) orally once a day  bumetanide 1 mg oral tablet: 0.5 tab(s) orally once a day 2 tabs and 1 tab on alternating days  latanoprost 0.005% ophthalmic solution: 1 gram(s) in each eye once a day (at bedtime)  losartan 25 mg oral tablet: 1 tab(s) orally once a day  metoprolol succinate 100 mg oral tablet, extended release: 2 tab(s) orally once a day  NovoLOG FlexPen 100 units/mL injectable solution: 7 unit(s) injectable once a day before breakfast  NovoLOG FlexPen 100 units/mL injectable solution: 2 unit(s) injectable once a day before lunch  nystatin 100,000 units/mL oral suspension: 5 milliliter(s) orally 4 times a day  patiromer 25.2 g oral powder for reconstitution: 25.2 gram(s) orally once a day  pravastatin 20 mg oral tablet: 1 orally once a day  predniSONE 10 mg oral tablet: 1.5 tab(s) orally once a day  Prolia 60 mg/mL subcutaneous solution: 60 milligram(s) subcutaneously every 6 months  sulfamethoxazole-trimethoprim 400 mg-80 mg oral tablet: 1 tab(s) orally 3 times a week  tacrolimus 1 mg oral capsule: 3 cap(s) orally 2 times a day Tacrolimus 3mg twice daily  valGANciclovir 450 mg oral tablet: 1 tab(s) orally 2 times a week  Vitamin D3 25 mcg (1000 intl units) oral tablet: 1 tab(s) orally once a day   apixaban 5 mg oral tablet: 1 tab(s) orally every 12 hours  aspirin 81 mg oral tablet, chewable: 1 tab(s) orally once a day  bumetanide 1 mg oral tablet: 0.5 tab(s) orally once a day  latanoprost 0.005% ophthalmic solution: 1 gram(s) in each eye once a day (at bedtime)  losartan 25 mg oral tablet: 1 tab(s) orally once a day  metoprolol succinate 100 mg oral tablet, extended release: 2 tab(s) orally once a day  NovoLOG FlexPen 100 units/mL injectable solution: 7 unit(s) injectable once a day before breakfast  NovoLOG FlexPen 100 units/mL injectable solution: 2 unit(s) injectable once a day before lunch  nystatin 100,000 units/mL oral suspension: 5 milliliter(s) orally 4 times a day  Outpatient physical therapy: outpatient physical therapy Z96.6 R26  patiromer 25.2 g oral powder for reconstitution: 25.2 gram(s) orally once a day  pravastatin 20 mg oral tablet: 1 orally once a day  predniSONE 10 mg oral tablet: 1.5 tab(s) orally once a day  Prolia 60 mg/mL subcutaneous solution: 60 milligram(s) subcutaneously every 6 months  rolling walker: Rolling walker, R26, Z96.6  sulfamethoxazole-trimethoprim 400 mg-80 mg oral tablet: 1 tab(s) orally 3 times a week  tacrolimus 1 mg oral capsule: 3 cap(s) orally 2 times a day Tacrolimus 3mg twice daily  valGANciclovir 450 mg oral tablet: 1 tab(s) orally 2 times a week  Vitamin D3 25 mcg (1000 intl units) oral tablet: 1 tab(s) orally once a day   apixaban 5 mg oral tablet: 1 tab(s) orally every 12 hours  aspirin 81 mg oral tablet, chewable: 1 tab(s) orally once a day  bumetanide 1 mg oral tablet: 0.5 tab(s) orally once a day  latanoprost 0.005% ophthalmic solution: 1 gram(s) in each eye once a day (at bedtime)  losartan 25 mg oral tablet: 1 tab(s) orally once a day  metoprolol succinate 100 mg oral tablet, extended release: 2 tab(s) orally once a day  NovoLOG FlexPen 100 units/mL injectable solution: 2 unit(s) injectable once a day before lunch  nystatin 100,000 units/mL oral suspension: 5 milliliter(s) orally 4 times a day  Outpatient physical therapy: outpatient physical therapy Z96.6 R26  patiromer 25.2 g oral powder for reconstitution: 25.2 gram(s) orally once a day  pravastatin 20 mg oral tablet: 1 orally once a day  predniSONE 10 mg oral tablet: 1.5 tab(s) orally once a day  Prolia 60 mg/mL subcutaneous solution: 60 milligram(s) subcutaneously every 6 months  Rollator: Rollator  rolling walker: Rolling walker, R26, Z96.6  sulfamethoxazole-trimethoprim 400 mg-80 mg oral tablet: 1 tab(s) orally 3 times a week Monday/Wednesday/Friday  tacrolimus 1 mg oral capsule: 2 cap(s) orally 2 times a day Tacrolimus 3mg twice daily  valGANciclovir 450 mg oral tablet: 1 tab(s) orally 2 times a week Monday and Thursday  Vitamin D3 25 mcg (1000 intl units) oral tablet: 1 tab(s) orally once a day

## 2024-01-29 NOTE — PROGRESS NOTE ADULT - NS ATTEND AMEND GEN_ALL_CORE FT
BP improved, cont hold home BP meds  cont IVF  NORMAN unchanged  cont tacro 3mg BID  level 6  cont pred   follow infectious w/u  plan d/w hospitalist
Patient doing well overall, creatinine improving  Will readd home dose Toprol and losartan  Will resume home PPX and IS  Plan for discharge tomorrow

## 2024-01-29 NOTE — DISCHARGE NOTE PROVIDER - NSDCCPCAREPLAN_GEN_ALL_CORE_FT
PRINCIPAL DISCHARGE DIAGNOSIS  Diagnosis: Syncope  Assessment and Plan of Treatment: You came into the hospital with blurry vision and had loss of consciousness, with hitting your head on the table and shaking activity for 3-5 minutes. You werer evaluated with head imaging that did not show any bleeding or stroke. You were evaluated by neurology without concern for seizure. You were also evaluated by heart transplant team, and had a stress echocardiogram done, which showed no changes in your transplanted heart. While we are not certain of the cause of your syncope, we believe it may be related to poor water and food intake in the setting of your GI upset. You were given fluids, had good food intake without any diarrhea or stomach pain while in the hospital, and you were evaluated by physical therapy. On discharge, please follow up with Dr. Campbell on 1/31/2024. If you experience loss of consciousness, fall, dizziness, blurry vision, chest pain, please notify your doctor immediately, you may need to return to the Emergency Room.      SECONDARY DISCHARGE DIAGNOSES  Diagnosis: NORMAN (acute kidney injury)  Assessment and Plan of Treatment: While you were in the hospital, your kidney function test called creatinine was elevate. After giving fluids it improved and continued to improve on day of discharge.     PRINCIPAL DISCHARGE DIAGNOSIS  Diagnosis: Syncope  Assessment and Plan of Treatment: You came into the hospital with blurry vision and had loss of consciousness, with hitting your head on the table and shaking activity for 3-5 minutes. You werer evaluated with head imaging that did not show any bleeding or stroke. You were evaluated by neurology without concern for seizure. You were also evaluated by heart transplant team, and had a stress echocardiogram done, which showed no changes in your transplanted heart. While we are not certain of the cause of your syncope, we believe it may be related to poor water and food intake in the setting of your GI upset. You were given fluids, had good food intake without any diarrhea or stomach pain while in the hospital, and you were evaluated by physical therapy. On discharge, please follow up with Dr. Campbell on 2/12/2024. If you experience loss of consciousness, fall, dizziness, blurry vision, chest pain, please notify your doctor immediately, you may need to return to the Emergency Room.      SECONDARY DISCHARGE DIAGNOSES  Diagnosis: NORMAN (acute kidney injury)  Assessment and Plan of Treatment: While you were in the hospital, your kidney function test called creatinine was elevate. After giving fluids it improved and continued to improve on day of discharge.

## 2024-01-29 NOTE — PROGRESS NOTE ADULT - PROBLEM SELECTOR PLAN 2
basline Cr 1.5, now with Cr 2.7, likely iso hypotension and hypovolemia  - s/p IVF  - holding home ARB, diuretics  - bladder scan ordered  - monitor urine output basline Cr 1.5, admitted with Cr 2.7, likely iso hypotension and hypovolemia  - s/p IVF  - hold home diuretics  - bladder scan 154cc, urining well w/o CVAT  - monitor urine output  - improving

## 2024-01-29 NOTE — PROGRESS NOTE ADULT - PROBLEM SELECTOR PLAN 2
- hx of OHT in February 2018  - please resume home toprol 200 mg daily today  - resume home losartan tonight  - would resume bumex 0.5 mg daily on discharge  - continue ASA 81mg and Pravastatin 20 mg PO QD   - continue home Veltassa 25.2gm daily due to hx of hyperkalemia.

## 2024-01-29 NOTE — PROVIDER CONTACT NOTE (OTHER) - ASSESSMENT
Patient A&Ox4, denies chest pain, sob or discomfort. VSS bp: 135/92 Hr 117 t:97.6 RR 18 O2 97% room air. Patient tachycardic with activity.

## 2024-01-30 ENCOUNTER — NON-APPOINTMENT (OUTPATIENT)
Age: 74
End: 2024-01-30

## 2024-01-30 ENCOUNTER — TRANSCRIPTION ENCOUNTER (OUTPATIENT)
Age: 74
End: 2024-01-30

## 2024-01-30 VITALS
TEMPERATURE: 98 F | OXYGEN SATURATION: 96 % | RESPIRATION RATE: 18 BRPM | SYSTOLIC BLOOD PRESSURE: 129 MMHG | DIASTOLIC BLOOD PRESSURE: 87 MMHG | HEART RATE: 93 BPM

## 2024-01-30 LAB
ANION GAP SERPL CALC-SCNC: 11 MMOL/L — SIGNIFICANT CHANGE UP (ref 5–17)
BUN SERPL-MCNC: 41 MG/DL — HIGH (ref 7–23)
CALCIUM SERPL-MCNC: 8.6 MG/DL — SIGNIFICANT CHANGE UP (ref 8.4–10.5)
CHLORIDE SERPL-SCNC: 108 MMOL/L — SIGNIFICANT CHANGE UP (ref 96–108)
CO2 SERPL-SCNC: 20 MMOL/L — LOW (ref 22–31)
CREAT SERPL-MCNC: 1.75 MG/DL — HIGH (ref 0.5–1.3)
CULTURE RESULTS: SIGNIFICANT CHANGE UP
CULTURE RESULTS: SIGNIFICANT CHANGE UP
EGFR: 41 ML/MIN/1.73M2 — LOW
GLUCOSE BLDC GLUCOMTR-MCNC: 120 MG/DL — HIGH (ref 70–99)
GLUCOSE BLDC GLUCOMTR-MCNC: 152 MG/DL — HIGH (ref 70–99)
GLUCOSE SERPL-MCNC: 108 MG/DL — HIGH (ref 70–99)
MAGNESIUM SERPL-MCNC: 2.2 MG/DL — SIGNIFICANT CHANGE UP (ref 1.6–2.6)
PHOSPHATE SERPL-MCNC: 2.4 MG/DL — LOW (ref 2.5–4.5)
POTASSIUM SERPL-MCNC: 4.4 MMOL/L — SIGNIFICANT CHANGE UP (ref 3.5–5.3)
POTASSIUM SERPL-SCNC: 4.4 MMOL/L — SIGNIFICANT CHANGE UP (ref 3.5–5.3)
SODIUM SERPL-SCNC: 139 MMOL/L — SIGNIFICANT CHANGE UP (ref 135–145)
SPECIMEN SOURCE: SIGNIFICANT CHANGE UP
SPECIMEN SOURCE: SIGNIFICANT CHANGE UP
TACROLIMUS SERPL-MCNC: 11 NG/ML — SIGNIFICANT CHANGE UP

## 2024-01-30 PROCEDURE — 82330 ASSAY OF CALCIUM: CPT

## 2024-01-30 PROCEDURE — 97161 PT EVAL LOW COMPLEX 20 MIN: CPT

## 2024-01-30 PROCEDURE — 83735 ASSAY OF MAGNESIUM: CPT

## 2024-01-30 PROCEDURE — 84132 ASSAY OF SERUM POTASSIUM: CPT

## 2024-01-30 PROCEDURE — 83880 ASSAY OF NATRIURETIC PEPTIDE: CPT

## 2024-01-30 PROCEDURE — 99239 HOSP IP/OBS DSCHRG MGMT >30: CPT

## 2024-01-30 PROCEDURE — 84295 ASSAY OF SERUM SODIUM: CPT

## 2024-01-30 PROCEDURE — 85014 HEMATOCRIT: CPT

## 2024-01-30 PROCEDURE — 81001 URINALYSIS AUTO W/SCOPE: CPT

## 2024-01-30 PROCEDURE — 74176 CT ABD & PELVIS W/O CONTRAST: CPT | Mod: MA

## 2024-01-30 PROCEDURE — 36415 COLL VENOUS BLD VENIPUNCTURE: CPT

## 2024-01-30 PROCEDURE — 82947 ASSAY GLUCOSE BLOOD QUANT: CPT

## 2024-01-30 PROCEDURE — 80053 COMPREHEN METABOLIC PANEL: CPT

## 2024-01-30 PROCEDURE — 96374 THER/PROPH/DIAG INJ IV PUSH: CPT

## 2024-01-30 PROCEDURE — 84100 ASSAY OF PHOSPHORUS: CPT

## 2024-01-30 PROCEDURE — 99285 EMERGENCY DEPT VISIT HI MDM: CPT

## 2024-01-30 PROCEDURE — 82803 BLOOD GASES ANY COMBINATION: CPT

## 2024-01-30 PROCEDURE — 80197 ASSAY OF TACROLIMUS: CPT

## 2024-01-30 PROCEDURE — 85027 COMPLETE CBC AUTOMATED: CPT

## 2024-01-30 PROCEDURE — 93005 ELECTROCARDIOGRAM TRACING: CPT

## 2024-01-30 PROCEDURE — 83036 HEMOGLOBIN GLYCOSYLATED A1C: CPT

## 2024-01-30 PROCEDURE — 85018 HEMOGLOBIN: CPT

## 2024-01-30 PROCEDURE — 97116 GAIT TRAINING THERAPY: CPT

## 2024-01-30 PROCEDURE — 82553 CREATINE MB FRACTION: CPT

## 2024-01-30 PROCEDURE — 80307 DRUG TEST PRSMV CHEM ANLYZR: CPT

## 2024-01-30 PROCEDURE — 83605 ASSAY OF LACTIC ACID: CPT

## 2024-01-30 PROCEDURE — 82435 ASSAY OF BLOOD CHLORIDE: CPT

## 2024-01-30 PROCEDURE — 70450 CT HEAD/BRAIN W/O DYE: CPT | Mod: MA

## 2024-01-30 PROCEDURE — 97530 THERAPEUTIC ACTIVITIES: CPT

## 2024-01-30 PROCEDURE — 84484 ASSAY OF TROPONIN QUANT: CPT

## 2024-01-30 PROCEDURE — 85025 COMPLETE CBC W/AUTO DIFF WBC: CPT

## 2024-01-30 PROCEDURE — 87040 BLOOD CULTURE FOR BACTERIA: CPT

## 2024-01-30 PROCEDURE — 71045 X-RAY EXAM CHEST 1 VIEW: CPT

## 2024-01-30 PROCEDURE — 80048 BASIC METABOLIC PNL TOTAL CA: CPT

## 2024-01-30 PROCEDURE — 87637 SARSCOV2&INF A&B&RSV AMP PRB: CPT

## 2024-01-30 PROCEDURE — 82962 GLUCOSE BLOOD TEST: CPT

## 2024-01-30 RX ORDER — TACROLIMUS 5 MG/1
2 CAPSULE ORAL
Qty: 0 | Refills: 0 | DISCHARGE

## 2024-01-30 RX ORDER — TACROLIMUS 1 MG/1
1 CAPSULE ORAL TWICE DAILY
Qty: 120 | Refills: 5 | Status: ACTIVE | COMMUNITY
Start: 2023-03-10 | End: 1900-01-01

## 2024-01-30 RX ORDER — SODIUM,POTASSIUM PHOSPHATES 278-250MG
2 POWDER IN PACKET (EA) ORAL ONCE
Refills: 0 | Status: COMPLETED | OUTPATIENT
Start: 2024-01-30 | End: 2024-01-30

## 2024-01-30 RX ORDER — INSULIN ASPART 100 [IU]/ML
7 INJECTION, SOLUTION SUBCUTANEOUS
Qty: 0 | Refills: 0 | DISCHARGE

## 2024-01-30 RX ADMIN — Medication 81 MILLIGRAM(S): at 12:14

## 2024-01-30 RX ADMIN — APIXABAN 5 MILLIGRAM(S): 2.5 TABLET, FILM COATED ORAL at 05:30

## 2024-01-30 RX ADMIN — Medication 15 MILLIGRAM(S): at 05:27

## 2024-01-30 RX ADMIN — Medication 1000 UNIT(S): at 12:13

## 2024-01-30 RX ADMIN — SODIUM ZIRCONIUM CYCLOSILICATE 5 GRAM(S): 10 POWDER, FOR SUSPENSION ORAL at 12:15

## 2024-01-30 RX ADMIN — Medication 2 PACKET(S): at 09:05

## 2024-01-30 RX ADMIN — Medication 200 MILLIGRAM(S): at 05:30

## 2024-01-30 RX ADMIN — Medication 500000 UNIT(S): at 05:27

## 2024-01-30 RX ADMIN — TACROLIMUS 3 MILLIGRAM(S): 5 CAPSULE ORAL at 05:30

## 2024-01-30 NOTE — PROGRESS NOTE ADULT - PROBLEM SELECTOR PLAN 3
s/p OHT 2018  - c/w prednisone 15mg daily  - decrease tacrolimus to 2mg BID  - valtrex 450mg TIW - last dose 1/29  - bactrim PJP ppx MWF  - daily tacro levels in AM  - cardiology following

## 2024-01-30 NOTE — PROGRESS NOTE ADULT - PROBLEM SELECTOR PROBLEM 3
Immunosuppression
H/O heart transplant
Immunosuppression
H/O heart transplant

## 2024-01-30 NOTE — PROGRESS NOTE ADULT - PROBLEM SELECTOR PLAN 7
DVT prophylaxis: eliquis 5mg BID  Diet: regular, EDWARD
- hx of Afib  - Continue Toprol as stated above  - Continue home Eliquis 5 mg PO BID
DVT prophylaxis: eliquis 5mg BID  Diet: regular, EDWARD
DVT prophylaxis: eliquis 5mg BID  Diet: regular, EDWARD
- hx of Afib  - Continue Toprol as stated above  - Continue home Eliquis 5 mg PO BID.
DVT prophylaxis: eliquis 5mg BID  Diet: regular, EDWARD

## 2024-01-30 NOTE — PROGRESS NOTE ADULT - PROBLEM SELECTOR PROBLEM 2
NORMAN (acute kidney injury)
H/O heart transplant
H/O heart transplant
NORMAN (acute kidney injury)

## 2024-01-30 NOTE — PROGRESS NOTE ADULT - PROBLEM SELECTOR PLAN 4
hx of persistent leukocytosis with negative infectious workup, WBC on admission 12, no fever, or signs & sx of infection  - negative CT A&P  - negative UA  - f/u BCx 1/25 - NGTD  - will hold off on antibiotics and monitor
- CMV: Continue with renal dose Valcyte 450 mg TIW  - Toxo: Continue with Bactrim SS M/W/F  - Trush: remains on Trush S&S QID.
- CMV: Continue with renal dose Valcyte 450 mg TIW  - Toxo: Continue with Bactrim SS M/W/F  - Trush: remains on Trush S&S QID

## 2024-01-30 NOTE — PROGRESS NOTE ADULT - ATTENDING COMMENTS
74yo M pmhx HTN, HLD, nonischemic cardiomyopathy s/p HM2 LVAD (6/2017) and then s/p heart transplant from Hep. 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 No other complaints. No chest pain, SOB, abodminal pain, dizziness. Walking to bathroom.     #syncope  -Orthostatic ?syncope/light headedness.   -restart ARB 1/29\  -encourage PO hydration  -TTE performed on stress test on 1/22, no need for another.   -PT eval-->home with ouptatient PT    #NORMAN on CKD  -improving  -suspect hemodynamically mediated NORMAN  -IVF PRN, encourage PO intake (500cc/ 5 hours)  -restart ARB 1/29, hold bumex another 1-2 days.   -monitor tacro levels  -hydration as above  -trend.    #Hx cardiac transplant  -appreciate heart failure. Continue tacrolimus.  -uptitrate beta blocker to home dose given sinus tachycardia. No concern for PE as on eliquis.     #sanchez syndrome  -cotninue steroids    #pAf  -eliquis    d/c next 24-48 hours once cleared by cardiology and restarted on fabian emeds
Patient seen and evaluated. Feels well. HR and BP controleld. Creatinine stable.   Appreicate transplant cardiology, outpatient appointment changed from 1/31-->2/12 with Dr. Campbell. Therefore will restart 0.5mg bumex starting 1/31.  Octavia current meds. Will confirm with transplant cards tacrolimus dosing given troph of 11.   d/c planning 33 minutes with close outpatient cardiology follow up.
72yo M pmhx HTN, HLD, nonischemic cardiomyopathy s/p HM2 LVAD (6/2017) and then s/p heart transplant from Hep. C donor (treated) 2/23/18 (post op course complicated by graft dysfunction treated by plasmapheresis, IVIG, and rituximab), on tacrolimus, sacnhez syndrome (on prednisone), post transplant pAF/AFl on eliquis, presenting to the hospital with fall and ? syncope (although he denies syncope, he did hit his head on a table)    Overall he is improved.       #?syncope  -Orthostatic ?syncope/light headedness.   -hold ARB, diuretics for now  -fluids PRN  -encourage PO hydration  - check EEG.   -TTE performed on stress test on 1/22, no need for another.   -PT eval    #NORMAN on CKD  -improving  -suspect hemodynamically mediated NORMAN  -check bladder scan to r/o post-obstructive.   -off ARB, other nephrotoxic  -monitor tacro levels  -hydration as above  -trend.    #Hx cardiac transplant  -appreciate heart failrue. Continue tacrolimus.    #sanchez syndrome  -cotninue steroids    #pAf  -eliquis    D/C once able to ambulate and creatinine normalizes. Pending PT final recs
72yo M pmhx HTN, HLD, nonischemic cardiomyopathy s/p HM2 LVAD (6/2017) and then s/p heart transplant from Hep. 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 syncope (although he denies syncope, he did hit his head on a table)    Overall he is improved.       #?syncope  -Orthostatic ?syncope/light headedness.   -hold ARB, diuretics for now  -fluids PRN  -encourage PO hydration  -TTE performed on stress test on 1/22, no need for another.   -PT eval    #NORMAN on CKD  -improving  -suspect hemodynamically mediated NORMAN  -check bladder scan to r/o post-obstructive.   -IVF PRN, encourage PO intake (500cc/ 5 hours)  -off ARB, other nephrotoxic  -monitor tacro levels  -hydration as above  -trend.    #Hx cardiac transplant  -appreciate heart failure. Continue tacrolimus.    #sanchez syndrome  -cotninue steroids    #pAf  -eliquis    D/C once able to ambulate and creatinine normalizes. Pending PT final recs  (patient wants to go home)

## 2024-01-30 NOTE — PROGRESS NOTE ADULT - PROBLEM SELECTOR PROBLEM 7
Need for prophylactic measure
Atrial fibrillation
Need for prophylactic measure
Atrial fibrillation

## 2024-01-30 NOTE — PROGRESS NOTE ADULT - SUBJECTIVE AND OBJECTIVE BOX
Patient is a 73y old  Male who presents with a chief complaint of syncope (29 Jan 2024 13:33)    INTERVAL HPI/OVERNIGHT EVENTS: Pt had no acute overnight events. No chest pain, cough, dizziness upon walking. Plan for dc today.    FAMILY HISTORY:  No pertinent family history in first degree relatives    No Known Allergies    MEDS:  apixaban 5 milliGRAM(s) Oral two times a day  artificial  tears Solution 1 Drop(s) Both EYES daily  aspirin  chewable 81 milliGRAM(s) Oral daily  atorvastatin 10 milliGRAM(s) Oral at bedtime  cholecalciferol 1000 Unit(s) Oral daily  dextrose 5%. 1000 milliLiter(s) IV Continuous <Continuous>  dextrose 5%. 1000 milliLiter(s) IV Continuous <Continuous>  dextrose 50% Injectable 25 Gram(s) IV Push once  dextrose 50% Injectable 25 Gram(s) IV Push once  dextrose 50% Injectable 12.5 Gram(s) IV Push once  dextrose Oral Gel 15 Gram(s) Oral once PRN  glucagon  Injectable 1 milliGRAM(s) IntraMuscular once  insulin lispro (ADMELOG) corrective regimen sliding scale   SubCutaneous three times a day before meals  insulin lispro (ADMELOG) corrective regimen sliding scale   SubCutaneous at bedtime  latanoprost 0.005% Ophthalmic Solution 1 Drop(s) Both EYES at bedtime  losartan 25 milliGRAM(s) Oral at bedtime  metoprolol succinate  milliGRAM(s) Oral daily  nystatin    Suspension 790269 Unit(s) Oral three times a day  predniSONE   Tablet 15 milliGRAM(s) Oral daily  sodium chloride 0.9%. 500 milliLiter(s) IV Continuous <Continuous>  sodium chloride 0.9%. 1000 milliLiter(s) IV Continuous <Continuous>  sodium zirconium cyclosilicate 5 Gram(s) Oral daily  tacrolimus 3 milliGRAM(s) SubLingual every 12 hours  trimethoprim   80 mG/sulfamethoxazole 400 mG 1 Tablet(s) Oral <User Schedule>      T(C): 36.4 (01-30-24 @ 04:38), Max: 36.8 (01-29-24 @ 11:41)  HR: 98 (01-30-24 @ 04:38) (98 - 116)  BP: 136/89 (01-30-24 @ 04:38) (132/88 - 143/95)  RR: 18 (01-30-24 @ 04:38) (18 - 18)  SpO2: 99% (01-30-24 @ 04:38) (95% - 99%)  Wt(kg): --    PHYSICAL EXAM:  GENERAL: NAD, well-developed  HEENT: normal conjunctiva, PEERLA  RESPIRATORY: Normal respiratory effort; lungs are clear to auscultation bilaterally  CARDIOVASCULAR: Regular rate and rhythm, normal S1 and S2, no murmur/rub/gallop;   ABDOMEN: No tenderness to palpation at all four quadrants, +BS  MUSCULOSKELETAL no clubbing or cyanosis of digits; no joint swelling or tenderness to palpation  EXTREMITIES No lower extremity edema; Peripheral pulses are 2+ bilaterally, R 3rd digit gout w/o pain  SKIN: well-perfused, no dry skin    Consultant(s) Notes Reviewed:  [x ] YES  [ ] NO  Care Discussed with Consultants/Other Providers [ x] YES  [ ] NO    LABS:    140  |  107  |  42<H>  ----------------------------<  98  4.3   |  22  |  1.63<H>    Ca    8.3<L>      29 Jan 2024 07:30  Phos  1.9     01-29  Mg     2.4     01-29    RADIOLOGY & ADDITIONAL TESTS:    CT Abdomen and Pelvis No Cont (01.25.24 @ 20:01)  No hydronephrosis or obstructing renal calculus.  No acute pathology    CT Head No Cont (01.25.24 @ 18:17)  No acute hemorrhage, edema, or mass effect. No change from 5/23/2023.    Xray Chest 1 View- PORTABLE-Urgent (01.25.24 @ 20:20)  Bibasilar opacities, not significantly changed since 7/10/2023 and likely   representing atelectasis.

## 2024-01-30 NOTE — PROGRESS NOTE ADULT - PROVIDER SPECIALTY LIST ADULT
Transplant Cardiology
Internal Medicine
Transplant Cardiology

## 2024-01-30 NOTE — PROGRESS NOTE ADULT - PROBLEM SELECTOR PROBLEM 1
Seizure-like activity

## 2024-01-30 NOTE — DISCHARGE NOTE NURSING/CASE MANAGEMENT/SOCIAL WORK - NSDCVIVACCINE_GEN_ALL_CORE_FT
Hep A, adult; 07-Feb-2018 09:02; Kathleen Reyes (RN); [employer]; 7439F; IntraMuscular; Deltoid Right.; 1 milliLiter(s); VIS (VIS Published: 20-Jul-2017, VIS Presented: 07-Feb-2018);   Hep B, adult; 07-Feb-2018 09:09; Kathleen Reyes (RN); [employer]; B39CM; IntraMuscular; Deltoid Left.; 1 milliLiter(s); VIS (VIS Published: 20-Jul-2016, VIS Presented: 07-Feb-2018);   Hep B, adult; 16-Feb-2018 19:00; Brooklynn Coles ([place]); [employer]; B39CM; IntraMuscular; Deltoid Right.; 1 milliLiter(s); VIS (VIS Published: 20-Jul-2016, VIS Presented: 16-Feb-2018);   influenza, injectable, quadrivalent, preservative free; 02-Oct-2020 12:07; Enriqueta Hassan ([place]); [employer]; [id number] (Exp. Date: 30-Jun-2021); IntraMuscular; Deltoid Right.; 0.5 milliLiter(s); VIS (VIS Published: 15-Aug-2019, VIS Presented: 02-Oct-2020);

## 2024-01-30 NOTE — DISCHARGE NOTE NURSING/CASE MANAGEMENT/SOCIAL WORK - NSDCPEFALRISK_GEN_ALL_CORE
For information on Fall & Injury Prevention, visit: [email or web address] OR  [email or web address] OR  https://www.cdc.gov/steadi/patient.html

## 2024-01-30 NOTE — PROGRESS NOTE ADULT - PROBLEM SELECTOR PROBLEM 6
Hypertension
NORMAN (acute kidney injury)
Hypertension
Hypertension
NORMAN (acute kidney injury)
Hypertension

## 2024-01-30 NOTE — PROGRESS NOTE ADULT - PROBLEM SELECTOR PLAN 2
basline Cr 1.5, admitted with Cr 2.7, likely iso hypotension and hypovolemia  - s/p IVF  - hold home diuretics  - bladder scan 154cc, urining well w/o CVAT  - monitor urine output  - improving

## 2024-01-30 NOTE — PROGRESS NOTE ADULT - PROBLEM/PLAN-7
DISPLAY PLAN FREE TEXT
FAMILY HISTORY:  No pertinent family history in first degree relatives

## 2024-01-30 NOTE — PROGRESS NOTE ADULT - PROBLEM SELECTOR PLAN 5
hx of pAF/AFl after OHT 2018  - on eliquis 5mg BID
hx of pAF/AFl after OHT 2018  - on eliquis 5mg BID
- continue with Prednisone as stated above  - of note follows with Dr. Rosario as outpatient
hx of pAF/AFl after OHT 2018  - on eliquis 5mg BID
hx of pAF/AFl after OHT 2018  - on eliquis 5mg BID
- continue with Prednisone as stated above  - of note follows with Dr. Rosario as outpatient.

## 2024-01-30 NOTE — PROGRESS NOTE ADULT - PROBLEM SELECTOR PLAN 1
syncope with LOC upon standing, and headstrike on cabinet, whole body shaking for ~3mins per home aide. No confusion, urinary incontinence, or tongue biting. likely iso hypovolemia given GI sx with poor PO intake x 2 days prior to admission.  - CTH negative  - s/p IVfluids  - negative orthostatics  - neg pharm stress TTE  - cardiology c/s, f/u recs  - resuming home antihypertensives - toprol 200mg daily, losartan 25mg qhs  - holding bumex. will restart home diuretic on discharge  - PT eval: outpatient PT & rolling walker. script provided

## 2024-01-30 NOTE — CHART NOTE - NSCHARTNOTEFT_GEN_A_CORE
74 yo M with hx of NICM HFrEF s/p HM2 LVAD in 6/2017 c/b recurrent GI hemorrhage and possible pump thrombosis who is now s/p heart transplant on 2/23/2018 with hep C+ donor c/b post-transplant hemolytic anemia/Grayson's syndrome presented on this admission with episode of light-headedness and syncope and found to be hypotensive with NORMAN, likely 2/2 hypovolemia. He improved after IV hydration and is now hemodynamically stable with baseline renal function.     Discharge medications:  - Toprol 200 mg QD  - Losartan 25 mg QD  - Bumex 0.5 mg QD (changed from 0.5 mg alternating 1 mg every other day)  - Aspirin 81 mg QD  - Eliquis 5 mg BID  - Prednisone 15 mg QD  - Bactrim 400 mg - 80 mg 3x/week  - Valcyte 450 mg 2x/week  - Nystatin oral suspension 5 ml QID  - Vitamin D3 25 mcg QD  - Pravastatin 20 mg QD  - Prolia 60 mg/cc subQ solution 60 mg every 6 months  - Latanoprost 0.005% ophthalmic solution 1g each eye QD  - Nocolog 100 units/cc 2 units QD before lunch  - Tacrolimus 2 mg BID (changed from 3 mg BID)  - Patiromer 25.2 g QD    Follow-up appointments:  - Has an appointment with Dr. Campbell on 2/12/2023 at   - Please get your blood work drawn on Monday 2/5/2023 72 yo M with hx of NICM HFrEF s/p HM2 LVAD in 6/2017 c/b recurrent GI hemorrhage and possible pump thrombosis who is now s/p heart transplant on 2/23/2018 with hep C+ donor c/b post-transplant hemolytic anemia/Grayson's syndrome presented on this admission with episode of light-headedness and syncope and found to be hypotensive with NORMAN 2/2 hypovolemia. He improved after IV hydration and is now hemodynamically stable with baseline renal function.     Discharge medications:  - Toprol 200 mg QD  - Losartan 25 mg QD  - Bumex 0.5 mg QD (changed from 0.5 mg alternating 1 mg every other day)  - Aspirin 81 mg QD  - Eliquis 5 mg BID  - Prednisone 15 mg QD  - Bactrim 400 mg - 80 mg 3x/week  - Valcyte 450 mg 2x/week  - Nystatin oral suspension 5 ml QID  - Vitamin D3 25 mcg QD  - Pravastatin 20 mg QD  - Prolia 60 mg/cc subQ solution 60 mg every 6 months  - Latanoprost 0.005% ophthalmic solution 1g each eye QD  - Nocolog 100 units/cc 2 units QD before lunch  - Tacrolimus 2 mg BID (changed from 3 mg BID)  - Patiromer 25.2 g QD    Follow-up appointments:  - Has an appointment with Dr. Campbell on 2/12/2023 at   - Please get your blood work drawn on Monday 2/5/2023

## 2024-01-30 NOTE — PROGRESS NOTE ADULT - PROBLEM SELECTOR PROBLEM 5
Hemolytic anemia
Atrial fibrillation
Hemolytic anemia
Atrial fibrillation

## 2024-01-30 NOTE — PROGRESS NOTE ADULT - PROBLEM SELECTOR PLAN 6
admissions BP 80s/60s - resolved  - IVF started  - negative orthostatics  - resuming home losartan and toprol. holding home bumex

## 2024-01-30 NOTE — PROGRESS NOTE ADULT - ASSESSMENT
72yo M pmhx HTN, HLD, nonischemic cardiomyopathy, chronic systolic heart failure s/p HM2 LVAD (6/2017), s/p heart transplant from Hep. 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
72yo M pmhx HTN, HLD, nonischemic cardiomyopathy, chronic systolic heart failure s/p HM2 LVAD (6/2017), s/p heart transplant from Hep. 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
73M PMH nonischemic cardiomyopathy and chronic systolic heart failure s/p HM2 LVAD in June 2017 complicated by recurrent GI hemorrhage and possible pump thrombosis who underwent heart transplant on 2/23/18 with a hepatitis C positive donor w/ complicated post-transplant course including hemolytic anemia/Grayson's syndrome. He now presents after an episode of light-headedness and syncope episode that was concerning for seizure like activity. Upon arrival he was noted to be hypotensive and have NORMAN. His pressure has now normalized however his renal function and electrolytes are remain abnormal. At this time he is receiving IVF. Was admitted to medicine and will continue to trend his renal function.         
74yo M pmhx HTN, HLD, nonischemic cardiomyopathy, chronic systolic heart failure s/p HM2 LVAD (6/2017), s/p heart transplant from Hep. 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
74yo M pmhx HTN, HLD, nonischemic cardiomyopathy, chronic systolic heart failure s/p HM2 LVAD (6/2017), s/p heart transplant from Hep. 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
73M PMH nonischemic cardiomyopathy and chronic systolic heart failure s/p HM2 LVAD in June 2017 complicated by recurrent GI hemorrhage and possible pump thrombosis who underwent heart transplant on 2/23/18 with a hepatitis C positive donor w/ complicated post-transplant course including hemolytic anemia/Grayson's syndrome. He now presents after an episode of light-headedness and syncope episode that was concerning for seizure like activity. Upon arrival he was noted to be hypotensive and have NORMAN. His pressure has now normalized however his renal function and electrolytes are remain abnormal. At this time he is receiving IVF. Was admitted to medicine and will continue to trend his renal function.

## 2024-01-31 ENCOUNTER — RX RENEWAL (OUTPATIENT)
Age: 74
End: 2024-01-31

## 2024-01-31 RX ORDER — ASPIRIN 81 MG/1
81 TABLET, COATED ORAL DAILY
Qty: 28 | Refills: 5 | Status: ACTIVE | COMMUNITY
Start: 2023-01-11 | End: 1900-01-01

## 2024-01-31 RX ORDER — APIXABAN 5 MG/1
5 TABLET, FILM COATED ORAL
Qty: 60 | Refills: 5 | Status: ACTIVE | COMMUNITY
Start: 2023-03-10 | End: 1900-01-01

## 2024-02-06 ENCOUNTER — APPOINTMENT (OUTPATIENT)
Dept: INFECTIOUS DISEASE | Facility: CLINIC | Age: 74
End: 2024-02-06
Payer: MEDICARE

## 2024-02-06 VITALS
OXYGEN SATURATION: 93 % | DIASTOLIC BLOOD PRESSURE: 75 MMHG | HEART RATE: 98 BPM | BODY MASS INDEX: 29.45 KG/M2 | SYSTOLIC BLOOD PRESSURE: 109 MMHG | WEIGHT: 177 LBS | TEMPERATURE: 97.3 F

## 2024-02-06 LAB
ALBUMIN SERPL ELPH-MCNC: 4.2 G/DL
ALP BLD-CCNC: 53 U/L
ALT SERPL-CCNC: 13 U/L
ANION GAP SERPL CALC-SCNC: 17 MMOL/L
AST SERPL-CCNC: 18 U/L
BILIRUB SERPL-MCNC: 0.5 MG/DL
BUN SERPL-MCNC: 37 MG/DL
CALCIUM SERPL-MCNC: 9.8 MG/DL
CHLORIDE SERPL-SCNC: 106 MMOL/L
CO2 SERPL-SCNC: 24 MMOL/L
CREAT SERPL-MCNC: 1.68 MG/DL
EGFR: 43 ML/MIN/1.73M2
GLUCOSE SERPL-MCNC: 93 MG/DL
HCT VFR BLD CALC: 38.6 %
HGB BLD-MCNC: 11.3 G/DL
MCHC RBC-ENTMCNC: 29.3 GM/DL
MCHC RBC-ENTMCNC: 29.3 PG
MCV RBC AUTO: 100 FL
PLATELET # BLD AUTO: 289 K/UL
POTASSIUM SERPL-SCNC: 4.4 MMOL/L
PROT SERPL-MCNC: 6.4 G/DL
RBC # BLD: 3.86 M/UL
RBC # FLD: 17 %
SODIUM SERPL-SCNC: 147 MMOL/L
WBC # FLD AUTO: 10.48 K/UL

## 2024-02-06 PROCEDURE — 90678 RSV VACC PREF BIVALENT IM: CPT | Mod: GY

## 2024-02-06 PROCEDURE — 90471 IMMUNIZATION ADMIN: CPT

## 2024-02-06 PROCEDURE — 99214 OFFICE O/P EST MOD 30 MIN: CPT | Mod: 25

## 2024-02-06 NOTE — PATIENT PROFILE ADULT. - --DESCRIBE SURGICAL SITE
Problem: RISK FOR INFECTION - ADULT  Goal: Absence of fever/infection during anticipated neutropenic period  Description: INTERVENTIONS  - Monitor WBC  - Administer growth factors as ordered  - Implement neutropenic guidelines  Outcome: Progressing     Problem: GASTROINTESTINAL - ADULT  Goal: Maintains or returns to baseline bowel function  Description: INTERVENTIONS:  - Assess bowel function  - Maintain adequate hydration with IV or PO as ordered and tolerated  - Evaluate effectiveness of GI medications  - Encourage mobilization and activity  - Obtain nutritional consult as needed  - Establish a toileting routine/schedule  - Consider collaborating with pharmacy to review patient's medication profile  Outcome: Not Progressing     Problem: CARDIOVASCULAR - ADULT  Goal: Maintains optimal cardiac output and hemodynamic stability  Description: INTERVENTIONS:  - Monitor vital signs, rhythm, and trends  - Monitor for bleeding, hypotension and signs of decreased cardiac output  - Evaluate effectiveness of vasoactive medications to optimize hemodynamic stability  - Monitor arterial and/or venous puncture sites for bleeding and/or hematoma  - Assess quality of pulses, skin color and temperature  - Assess for signs of decreased coronary artery perfusion - ex. Angina  - Evaluate fluid balance, assess for edema, trend weights  Outcome: Not Progressing     Problem: CARDIOVASCULAR - ADULT  Goal: Maintains optimal cardiac output and hemodynamic stability  Description: INTERVENTIONS:  - Monitor vital signs, rhythm, and trends  - Monitor for bleeding, hypotension and signs of decreased cardiac output  - Evaluate effectiveness of vasoactive medications to optimize hemodynamic stability  - Monitor arterial and/or venous puncture sites for bleeding and/or hematoma  - Assess quality of pulses, skin color and temperature  - Assess for signs of decreased coronary artery perfusion - ex. Angina  - Evaluate fluid balance, assess for edema,  trend weights  Outcome: Not Progressing   Continue on droplet precautions for flu, Denies any pain or discomfort, Has abdominal  Ascitis,For ultrasound paracentesis tomorrow,Has fair appetite. Will have clear liquid tomorrow till ready for paracentesis, No solid food. Patient is aware.   LVAD - dsg CDI

## 2024-02-07 ENCOUNTER — RESULT REVIEW (OUTPATIENT)
Age: 74
End: 2024-02-07

## 2024-02-07 ENCOUNTER — APPOINTMENT (OUTPATIENT)
Dept: HEMATOLOGY ONCOLOGY | Facility: CLINIC | Age: 74
End: 2024-02-07
Payer: MEDICARE

## 2024-02-07 VITALS
WEIGHT: 176.99 LBS | TEMPERATURE: 97.8 F | BODY MASS INDEX: 29.49 KG/M2 | HEART RATE: 98 BPM | SYSTOLIC BLOOD PRESSURE: 116 MMHG | RESPIRATION RATE: 16 BRPM | DIASTOLIC BLOOD PRESSURE: 80 MMHG | OXYGEN SATURATION: 99 % | HEIGHT: 65 IN

## 2024-02-07 LAB
ALBUMIN SERPL ELPH-MCNC: 4 G/DL
ALP BLD-CCNC: 54 U/L
ALT SERPL-CCNC: 14 U/L
ANION GAP SERPL CALC-SCNC: 13 MMOL/L
AST SERPL-CCNC: 19 U/L
BASOPHILS # BLD AUTO: 0.01 K/UL — SIGNIFICANT CHANGE UP (ref 0–0.2)
BASOPHILS NFR BLD AUTO: 0.1 % — SIGNIFICANT CHANGE UP (ref 0–2)
BILIRUB SERPL-MCNC: 0.6 MG/DL
BUN SERPL-MCNC: 49 MG/DL
CALCIUM SERPL-MCNC: 9.4 MG/DL
CHLORIDE SERPL-SCNC: 109 MMOL/L
CMV DNA SPEC QL NAA+PROBE: NOT DETECTED IU/ML
CMVPCR LOG: NOT DETECTED LOG10IU/ML
CO2 SERPL-SCNC: 26 MMOL/L
CREAT SERPL-MCNC: 1.72 MG/DL
EGFR: 41 ML/MIN/1.73M2
EOSINOPHIL # BLD AUTO: 0.03 K/UL — SIGNIFICANT CHANGE UP (ref 0–0.5)
EOSINOPHIL NFR BLD AUTO: 0.3 % — SIGNIFICANT CHANGE UP (ref 0–6)
GLUCOSE SERPL-MCNC: 91 MG/DL
HCT VFR BLD CALC: 36.4 % — LOW (ref 39–50)
HCV RNA SERPL NAA+PROBE-LOG IU: NOT DETECTED LOGIU/ML
HEPB DNA PCR INT: NOT DETECTED
HEPB DNA PCR LOG: NOT DETECTED LOGIU/ML
HEPC RNA INTERP: NOT DETECTED
HGB BLD-MCNC: 10.8 G/DL — LOW (ref 13–17)
IMM GRANULOCYTES NFR BLD AUTO: 1.3 % — HIGH (ref 0–0.9)
LYMPHOCYTES # BLD AUTO: 1.18 K/UL — SIGNIFICANT CHANGE UP (ref 1–3.3)
LYMPHOCYTES # BLD AUTO: 12.3 % — LOW (ref 13–44)
MCHC RBC-ENTMCNC: 29.7 G/DL — LOW (ref 32–36)
MCHC RBC-ENTMCNC: 29.8 PG — SIGNIFICANT CHANGE UP (ref 27–34)
MCV RBC AUTO: 100.6 FL — HIGH (ref 80–100)
MONOCYTES # BLD AUTO: 0.87 K/UL — SIGNIFICANT CHANGE UP (ref 0–0.9)
MONOCYTES NFR BLD AUTO: 9.1 % — SIGNIFICANT CHANGE UP (ref 2–14)
NEUTROPHILS # BLD AUTO: 7.39 K/UL — SIGNIFICANT CHANGE UP (ref 1.8–7.4)
NEUTROPHILS NFR BLD AUTO: 76.9 % — SIGNIFICANT CHANGE UP (ref 43–77)
NRBC # BLD: 0 /100 WBCS — SIGNIFICANT CHANGE UP (ref 0–0)
PLATELET # BLD AUTO: 254 K/UL — SIGNIFICANT CHANGE UP (ref 150–400)
POTASSIUM SERPL-SCNC: 5 MMOL/L
PROT SERPL-MCNC: 6.3 G/DL
RBC # BLD: 3.62 M/UL — LOW (ref 4.2–5.8)
RBC # FLD: 16.6 % — HIGH (ref 10.3–14.5)
SODIUM SERPL-SCNC: 147 MMOL/L
WBC # BLD: 9.6 K/UL — SIGNIFICANT CHANGE UP (ref 3.8–10.5)
WBC # FLD AUTO: 9.6 K/UL — SIGNIFICANT CHANGE UP (ref 3.8–10.5)

## 2024-02-07 PROCEDURE — 99213 OFFICE O/P EST LOW 20 MIN: CPT

## 2024-02-07 NOTE — ASSESSMENT
[FreeTextEntry1] : 74yo man with a history of a heart transplant in 2/2018.  Hospitalized in 3/23 with a.flutter and human metapneumovirus and possible superimposed pneumonia treated with 7days of Cefepime. Chronic ITP like thrombocytopenia for which he follows with hematology and has received steroids and Rituxan for autoimmune hemolytic anemia  Feeling well No interval hospitalizations for infeciton  Will administer H.flu vaccine today RSV vaccine has received meningitis vaccinations flu vaccine this season Covid boosters x 3 doses- declines additional  RTC 2-3 mo

## 2024-02-07 NOTE — PHYSICAL EXAM
[General Appearance - Alert] : alert [General Appearance - In No Acute Distress] : in no acute distress [Sclera] : the sclera and conjunctiva were normal [PERRL With Normal Accommodation] : pupils were equal in size, round, reactive to light [Extraocular Movements] : extraocular movements were intact [Outer Ear] : the ears and nose were normal in appearance [Oropharynx] : the oropharynx was normal with no thrush [Neck Appearance] : the appearance of the neck was normal [Neck Cervical Mass (___cm)] : no neck mass was observed [Jugular Venous Distention Increased] : there was no jugular-venous distention [Thyroid Diffuse Enlargement] : the thyroid was not enlarged [Auscultation Breath Sounds / Voice Sounds] : lungs were clear to auscultation bilaterally [Heart Rate And Rhythm] : heart rate was normal and rhythm regular [Full Pulse] : the pedal pulses are present [Edema] : there was no peripheral edema [Bowel Sounds] : normal bowel sounds [Abdomen Soft] : soft [Abdomen Tenderness] : non-tender [Abdomen Mass (___ Cm)] : no abdominal mass palpated [Costovertebral Angle Tenderness] : no CVA tenderness [No Palpable Adenopathy] : no palpable adenopathy [Musculoskeletal - Swelling] : no joint swelling [Nail Clubbing] : no clubbing  or cyanosis of the fingernails [Motor Tone] : muscle strength and tone were normal [Skin Color & Pigmentation] : normal skin color and pigmentation [] : no rash [Oriented To Time, Place, And Person] : oriented to person, place, and time [Affect] : the affect was normal [FreeTextEntry1] : murmur audible and mitral valve area syst and diastolic

## 2024-02-07 NOTE — HISTORY OF PRESENT ILLNESS
[FreeTextEntry1] : Yoseph Baez is a 74yo man who underwent a heart transplant in 2/2018.   He was hospitalized in 3/2023 with new onset a.flutter, fevers and a dry cough. His RVP was positive for Human metapenumovirus and his chest CT scan was notable for a collapsed RLL and RML. He was stable on room air and Pulmonary did feel a bronchoscopy was needed. His procalcitononnin and fungitell were elevated during the admission but sputum cultures , PJP PCR on sputum and fungal testing were negative. Blood cultures, urine histo antigen and legionella ag were also negative. He was treated with Cefepime x7 days for possible superimposed CAP with improvement in his symptoms. Patient had been receiving high dose prednisone for mixed type autoimmune hemolytic anemia and also received a dose of Rituxan 4/2020. 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

## 2024-02-08 LAB
FUNGITELL QUALITATIVE RESULT: NEGATIVE
FUNGITELL QUANTITATIVE VALUE: <31 PG/ML

## 2024-02-08 NOTE — REVIEW OF SYSTEMS
[Negative] : Allergic/Immunologic [SOB on Exertion] : no shortness of breath during exertion [de-identified] : swelling on R 3rd finger with tophi

## 2024-02-08 NOTE — ASSESSMENT
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 Was in remission with well compensated hemolytic anemia on low dose prednisone, but relapsed after tapered to 3 mg daily. 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  Care discussed with Dr Rosario. Hgb is 10.8 today, will continue Prednisone 15 mg daily.  Has R 3rd finger swelling, tophi noted; most likely gout flare.  Pt states it has been painful but the pain is improving. As colchicine will lower blood counts, will monitor status of finger swelling.  Will let cardiac transplant team know about finger.    Plan: Report any increase in finger swelling.   Prednisone 15 mg daily PCP prophylaxis as per Cardiology- is taking Bactrim CMP, LDH Tacrolimus/ ASA per Cardiology. Folic acid To ER prn RTC in one month.

## 2024-02-08 NOTE — PHYSICAL EXAM
[Restricted in physically strenuous activity but ambulatory and able to carry out work of a light or sedentary nature] : Status 1- Restricted in physically strenuous activity but ambulatory and able to carry out work of a light or sedentary nature, e.g., light house work, office work [Normal] : full range of motion and no deformities appreciated [de-identified] : RRR. S1S2 normal. Gr 2/6 holosystolic and holodiastolic murmur LLSB. No gallops. [de-identified] : swelling of R 3rd finger with tophi

## 2024-02-08 NOTE — HISTORY OF PRESENT ILLNESS
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 Continues to feel stronger and is more active.   Denies fatigue, fevers, chills, night sweats, palpitations, pain, bleeding, edema, LOBATO. Is being followed by cardiac transplant team.  Has full time aide now. No longer using walker.   Had flu, COVID and pneumonia shots.   Was hospitalzied in the end of January with syncopal episode, was found to be dehyrdated.  Has L 3rd finger swelling today-redness and swelling noted around nail bed with tophi.

## 2024-02-09 LAB
M TB IFN-G BLD-IMP: NEGATIVE
QUANTIFERON TB PLUS MITOGEN MINUS NIL: 2.95 IU/ML
QUANTIFERON TB PLUS NIL: 0.02 IU/ML
QUANTIFERON TB PLUS TB1 MINUS NIL: -0.01 IU/ML
QUANTIFERON TB PLUS TB2 MINUS NIL: -0.01 IU/ML

## 2024-02-11 NOTE — DISCHARGE NOTE ADULT - NSFTFHOME1RD_GEN_ALL_CORE
Chest pain/weakness during/after ambulation greater than 20 feet/Pain greater than 7 on scale of 10 on ambulation patient

## 2024-02-12 ENCOUNTER — APPOINTMENT (OUTPATIENT)
Dept: HEART FAILURE | Facility: CLINIC | Age: 74
End: 2024-02-12
Payer: MEDICARE

## 2024-02-12 VITALS
BODY MASS INDEX: 30.32 KG/M2 | DIASTOLIC BLOOD PRESSURE: 70 MMHG | WEIGHT: 182 LBS | SYSTOLIC BLOOD PRESSURE: 115 MMHG | OXYGEN SATURATION: 97 % | HEIGHT: 65 IN | TEMPERATURE: 97.5 F | HEART RATE: 90 BPM

## 2024-02-12 PROCEDURE — 93000 ELECTROCARDIOGRAM COMPLETE: CPT

## 2024-02-12 PROCEDURE — 99215 OFFICE O/P EST HI 40 MIN: CPT

## 2024-02-12 RX ORDER — LOSARTAN POTASSIUM 50 MG/1
50 TABLET, FILM COATED ORAL DAILY
Qty: 30 | Refills: 5 | Status: ACTIVE | COMMUNITY
Start: 2023-10-19 | End: 1900-01-01

## 2024-02-12 RX ORDER — BUMETANIDE 0.5 MG/1
0.5 TABLET ORAL
Qty: 30 | Refills: 5 | Status: DISCONTINUED | COMMUNITY
Start: 2023-06-01 | End: 2024-02-12

## 2024-02-12 RX ORDER — INSULIN ASPART 100 [IU]/ML
100 INJECTION, SOLUTION INTRAVENOUS; SUBCUTANEOUS
Qty: 15 | Refills: 5 | Status: DISCONTINUED | COMMUNITY
Start: 2023-03-10 | End: 2024-01-31

## 2024-02-12 NOTE — HISTORY OF PRESENT ILLNESS
[FreeTextEntry1] : Mr. Baez is a 73 year old man with past medical history of a nonischemic cardiomyopathy and chronic systolic heart failure s/p HM2 LVAD in June 2017 complicated by recurrent GI hemorrhage and possible pump thrombosis who underwent heart transplant on 2/23/18 with a hepatitis C positive donor. His course was complicated by bilateral IJ/subclavian thrombi, a persistent small right sided pleural effusion, as well as acute on chronic renal failure of unclear etiology. In addition, his post-operative course was complicated by graft dysfunction of unclear etiology and persistent C4d positive staining on endomyocardial biopsy with negative DSAs. He developed joshua evidence of graft dysfunction leading to treatment with plasmapheresis, IVIG, and rituximab. Subsequently in June of 2018 he was found to have an pneumonia, that was ultimately diagnosed as Nocardia. This was successfully treated with therapy completed in August 2019.  In the spring 2020, he was admitted with hemolytic anemia of unclear etiology. There were no clear precipitating factors, such as infection. He was treated with prednisone and Rituximab. Given the potential for CNI inhibitors leading to hemolytic anemia, we transitioned him to everolimus and he remained on high dose prednisone. He was subsequently admitted with acute anemia (not hemolytic). He developed severe leukopenia and Klebsiella bacteremia. Following treatment of this, he developed a severe C. diff colitis infection leading to a prolonged recovery. He was weaned to lower dose prednisone and the everolimus was transitioned back to tacrolimus. At the time of discharge he was found to have low levels of CMV viremia and was started back on valganciclovir.   4th annual R/LHC and biopsy (2/28/2022) showed normal hemodynamics EMBX ISHLT Grade 0R, IF C4D 50% +1 staining, and larger LAD.  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 Discharged home and had been following closely with heme/onc.  Pt readmitted 3/3-3/18/23 who presented to [facility] ER on with new onset of chest tightness, found to be in Aflutter/fib. Hospital course c/b by URI w/ human metapneumovirus, normal LV function, acute on chronic kidney injury likely 2/2 hypovolemia, worsening leukocytosis with CT chest c/f PNA and hyperglycemia. Heme/onc consulted for hemolytic anemia management and prednisone dosing reduced to 60mg daily. Endocrine consulted for new onset of hyperglycemia and insulin teaching.  Bacterial infection-work up negative to date however had an elevated Fungitell.  Treated with broad spectrum abx from 3/3 to 3/8 (zosyn then cefepime).  Pt was cleared for discharge home on 3/18.  Pt was re-admitted on who p/w on 6/14/23 recurrent hypoglycemia at home 2/2 poor PO intake and NORMAN SCr 3.1. Endocrine adjusted insulin regimen, pt was hydrated and SCr returned to baseline . Pt was discharged to rehab center on 6/23/23. Discharge weight 177lbs.  Pt presented to clinic on 7/10 from [facility] and was on IV meropenem for unclear reason, thus pt was referred for admission to rule out infection. During hospitalization, due to leukocytosis, he was pancultured, CT chest/abd/pelvis completed, no signs or source of infection was found, but completed 7 day course of meropenem (end date 7/17). Pt was discharged home on 7/18/23.  Pt readmitted 1/25/24 with episode of light-headedness and syncope and found to be hypotensive with NORMAN 2/2 hypovolemia. He improved after IV hydration, bumex decreased from 0.5 mg alternating 1 mg every other day to 0.5mg daily and returned to baseline renal function Scr 1.75. Discharged home on 1/29/24.  Pt arrives today for follow up clinic appt 6 years post heart transplant. He arrives with his aid (has services 7am -3pm 7 days/week).  Pt reports feeling good and takes daily walks with his cane. Aide is tracking VS and FS, weight stable 177-178 lbs, Checks AM FS 70-90's mg/dL mostly, 's-120's/80's-90's. Pt denied SOB, CP, palpitations, N/V/D, fever or chills.  Appetite good and sleeping fine.  Follows closely with hematology.  Health maintenance: DEXA 5/2019, 6/2021: osteopenia, 6/2022: worsening osteopenia. Followed by Dr. Clemens. CT Colon (4/2017) IMPRESSION: No colonic polyp or mass. PSA 6.76 (4/29/2022) HgA1c 4.7% (2/17/23) --> 7.3 % (5/18/23)

## 2024-02-12 NOTE — DISCUSSION/SUMMARY
[FreeTextEntry1] :  6 yrs post transplant Issues: LAD/RV fistula with coronary dilation not amenable to intervention.  Intial LV dyfunction initiated on GDMT, normalized. (last EF 55, RVD) hemolytic anemia/recent thrombocytopenia. Admission in feb 2023 for recurrent hem anemia. d/c on pred 75mg. Follow by Dr Rosario. Current dose of pred 15. Last seen by Dr Rosario last week. No plans for ritoxan as per his note.  on prolea for osteoperosis followed by Dr Clemens  intolerant of cellcept due leukopenia. has had serious infections in the past including kleb sepsis and severe cdiff and CMV viremia. everolimus d/c for that reason. Annual Feb 2023: no obstructive CAD. 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 Last Bx Feb 2022 OR. C4D 50%. +1 staining.No histopath features of AMR.  Last DSAs may 2-023 O%. Last heart care Jan 2024. allosure normal.  Admitted: March 3-18. viral pneumonia. metapneumovirus. Afib flutter. self converted. Admission May 23-6.2 Recurrent falls and volume overload. Diuresis. Mild LV dysfunction. No biopsy due to stable allosure. Admission June 14-23: Found at home semi-concious. Hypoglycemia. Insulin adjusted. d/c nursing home pending resolution of adequate home supports. Admitted July 10-17 from my clinic: he had come from nursing home from which we had no information. He was being treated meropenem. Pancutlured, Panscanned. Completed dayne. no source. WBC elevated on admission and d/c Admitted: Jan 2024: lightheadedness syncope, hypotension, juan f Cr 2.5 (baseline 1.3). Imp hypovolemic. d/c on lower dose of bumex  recent  stress Jan 2024: LV size and function. No RWMA. reached MPHR. RV nornal size and function. mild TR.   prograf 2 bid (4.2, goal is 4-6) , off cellcept , pred 15, eloquis 5 bid, asa nystatin, bactrim TIW, valcyte 450 biw, statin, toprol 200 , bumex .5 QD, insulin losartan 25  home /-140/  feeling well. c/o swollen right middle finger.  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  recent labs: Feb 12th  WBC 9.6, Hb 10, Plt 254, Na 143, K 4.8, BUN 49, Cr 1.6  Overall stable. No evidence for cardiac dysfunction despite large LAD/RV fistula.  Gout (on pred 15).  Plan: d/c diuretics follow weight.  make losartan 50 Qd.  start colchicine .6 QD  labs one week.  has rheum follow up end of months.  NP visit 3 mths. allosure and DSAs at that time - then d/c surveilance.  see me 6mth Marvin Campbell  45 mins with patient and chart

## 2024-02-12 NOTE — PHYSICAL EXAM
[Well Developed] : well developed [Well Nourished] : well nourished [Normal] : normal S1, S2, no murmur, no rub, no gallop [Normal S1, S2] : normal S1, S2 [Soft] : abdomen soft [Non Tender] : non-tender [de-identified] : no jvd [de-identified] : +III/VI diastolic murmur, tachycardic [de-identified] : 1+ pitting b/l ankle edema

## 2024-02-12 NOTE — CARDIOLOGY SUMMARY
[de-identified] : EKG 1/28/24:  bpm +RBBB unchanged  EKG 10/24/23 pending read EKG 4/3/23: NSR 91bpm +RBBB EKG 12/28/22: NSR 95 bpm +RBBB [de-identified] : DSE 1/22/2024: CONCLUSIONS  1. Left ventricular cavity is normal. Left ventricular systolic function is normal. There are no regional wall motion abnormalities seen.  2. No electrocardiographic evidence of ischemia at or near maximal predicted heart rate.    DSE 12/22/2020: Conclusions: 1. Normal hemodynamic response. 2. Normal electrocardiographic response. 3. Overall preserved left ventricular systolic function with mild hypokinesis of the mid to distal anterior wall at baseline. Normal augmentation in left ventricular systolic function with dobutamine infusion. 4. No evidence of inducible ischemia on stress echocardiogram images. *** Compared with echocardiogram of 12/2/2020, overall preserved left ventricular systolic function at baseline with mild hypokiesis of the mid to distal anterior wall. Normal augmentation in left ventricular systolic function with dobutamine infusion.  2/20/20: DSE EF 50-55% no evidence of inducible ischemia on pharmacologic stress echo images [de-identified] : TTE: 7/12/23- LVIDd 4.3, LVEF 55%, septal motion abnormal, grossly mild-mod enlarged, no reported valvulopathy TTE: 6/16/23: CONCLUSIONS:  1. Normal left ventricular cavity size. The left ventricular wall thickness is normal. The left ventricular systolic function is normal with an ejection fraction of 56 % by Sherman's method of disks. There are no regional wall motion abnormalities seen.  2. Mildly enlarged right ventricular cavity size, normal wall thickness and probably normal systolic function. The tricuspid annular plane systolic excursion (TAPSE) is 1.0 cm (normal >=1.7 cm).  3. Mild mitral regurgitation.  4. Compared to the transthoracic echocardiogram performed on 5/19/2023 there have been no significant interval changes. TTE 3/3/23: LVIDd 3.6, EF 50-55%, concentric remodeling  TTE 7/25/22: EF 60%, LVIDD 4.5cm, normal LV function, normal RV size with decreased RV function, mild TR, no pericardial effusion  TTE 2/28/22: EF 65% LVIDD not calculated. normal biV function. 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 minimal TR. no pericardial effusion. [de-identified] : \par  CT chest/abd/pelvis 3/14/23: IMPRESSION:\par  Chest: Improving aeration of previously impacted right lower lobe distal  airways with improving bibasilar atelectasis. Additionally, there is a \par  small clustered area of unchanged nodular opacities which may represent \par  superimposed infection or inflammation. 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  No additional coronary-cameral fistulae noted. [de-identified] : C/RHC 2/28/22: \par  Diagnostic Conclusions: \par  mLAD to RV fistula with LM-pLAD dilatation. IVUS performed of the RCA\par  which was normal. Normal RHC\par  \par  12/2/2020: RHC/Biopsy ISHLT Grade 0R\par  11/18/2020:  L/RHC w/ IVUS:\par  CORONARY VESSELS: The coronary circulation is right dominant.\par  LM:   --  LM: The vessel was very large sized. Angiography showed\par  aneurysmal dilatation.\par  LAD:   --  Proximal LAD: The vessel was very large sized. Angiography\par  showed aneurysmal dilatation.\par  --  Mid LAD: The vessel was very large sized and excessively ectatic. A\par  fistula to the left ventricle was identified.\par  --  Distal LAD: Normal. 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 No significant intimal thickening/hyperplasia or\par  plaque was observed. [de-identified] : b/l LE duplex: 3/15/23:  IMPRESSION: There is acute below the knee DVT affecting the right soleal and the left soleal and gastrocnemius veins.  DEXA: 8/15/2023: osteopenia

## 2024-02-16 ENCOUNTER — APPOINTMENT (OUTPATIENT)
Dept: RADIOLOGY | Facility: CLINIC | Age: 74
End: 2024-02-16
Payer: MEDICARE

## 2024-02-16 ENCOUNTER — APPOINTMENT (OUTPATIENT)
Dept: RHEUMATOLOGY | Facility: CLINIC | Age: 74
End: 2024-02-16
Payer: MEDICARE

## 2024-02-16 VITALS
DIASTOLIC BLOOD PRESSURE: 87 MMHG | SYSTOLIC BLOOD PRESSURE: 129 MMHG | TEMPERATURE: 98.2 F | HEIGHT: 65 IN | HEART RATE: 108 BPM | OXYGEN SATURATION: 94 % | BODY MASS INDEX: 30.32 KG/M2 | RESPIRATION RATE: 17 BRPM | WEIGHT: 182 LBS

## 2024-02-16 PROCEDURE — 73130 X-RAY EXAM OF HAND: CPT | Mod: 50

## 2024-02-16 PROCEDURE — 99215 OFFICE O/P EST HI 40 MIN: CPT

## 2024-02-16 PROCEDURE — G2211 COMPLEX E/M VISIT ADD ON: CPT

## 2024-02-18 NOTE — ASSESSMENT
[FreeTextEntry1] : 71-year-old female with complicated pmh hx including hear transplant, hemolytic anemia  1 tophaceous gout -stopped febuxostat for many months (unsure why) with worsening tophaceous gout and severe gout flare now over the right hand and wrist - send for x-rays on colchicine - has been on prednisone for over 1 year - now on 15 mg - does nt wish to increase dose consider allopurinol X krystexxa - will discuss with both cardiology, transplant and hematology.  2. CKD 3 - under the care of nephro   I reviewed previous labs results with patients. Laboratory tests ordered today Diagnosis and Prognosis discussed Continue with current medications medications refilled education provided on gout flare F/u 2 months

## 2024-02-18 NOTE — HISTORY OF PRESENT ILLNESS
[___ Month(s) Ago] : [unfilled] month(s) ago [History of Tophi] : the patient has a history of tophi [FreeTextEntry1] : ongoing gout with tophi formation over the right 3rd digit - now affecting his entirely right hand ongoing flares from gout - worse over the right hand now on 15 mg of prednisone given colchicine but only for 7 days due to risk of decreasing blood count   [FreeTextEntry3] : 2022 [FreeTextEntry4] : febuxostat

## 2024-02-19 NOTE — PROGRESS NOTE ADULT - ASSESSMENT
BLAS/SM: Pls see below. The pt's Zio results are pending interpretation on the [employer] website. TY   67 year old man with ACC/AHA stage D congestive heart failure with severely reduced LV systolic function due to a nonischemic dilated cardiomyopathy s/p HM2 LVAD on 6/12 with post-operative course complicated by hemodynamically but self-terminating ventricular tachycardia. He continues on amiodarone for recurrent VT and his inotropes and vasopressors are gradually being weaned. 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 67 year old man with ACC/AHA stage D congestive heart failure with severely reduced LV systolic function due to a nonischemic dilated cardiomyopathy s/p HM2 LVAD on 6/12 with post-operative course complicated by hemodynamically but self-terminating ventricular tachycardia. He continues on amiodarone for recurrent VT and his inotropes and vasopressors are gradually being weaned. 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

## 2024-02-20 ENCOUNTER — APPOINTMENT (OUTPATIENT)
Dept: ENDOCRINOLOGY | Facility: CLINIC | Age: 74
End: 2024-02-20
Payer: MEDICARE

## 2024-02-20 VITALS
BODY MASS INDEX: 29.62 KG/M2 | HEART RATE: 91 BPM | OXYGEN SATURATION: 95 % | DIASTOLIC BLOOD PRESSURE: 80 MMHG | WEIGHT: 178 LBS | SYSTOLIC BLOOD PRESSURE: 124 MMHG

## 2024-02-20 DIAGNOSIS — M81.0 AGE-RELATED OSTEOPOROSIS W/OUT CURRENT PATHOLOGICAL FRACTURE: ICD-10-CM

## 2024-02-20 DIAGNOSIS — Z79.52 LONG TERM (CURRENT) USE OF SYSTEMIC STEROIDS: ICD-10-CM

## 2024-02-20 PROCEDURE — 96372 THER/PROPH/DIAG INJ SC/IM: CPT

## 2024-02-20 PROCEDURE — 99214 OFFICE O/P EST MOD 30 MIN: CPT | Mod: 25

## 2024-02-20 RX ORDER — DENOSUMAB 60 MG/ML
60 INJECTION SUBCUTANEOUS
Qty: 1 | Refills: 0 | Status: COMPLETED | OUTPATIENT
Start: 2024-02-20

## 2024-02-20 RX ADMIN — DENOSUMAB 60 MG/ML: 60 INJECTION SUBCUTANEOUS at 00:00

## 2024-02-20 NOTE — HISTORY OF PRESENT ILLNESS
[Calcium (dietary)] : dietary Calcium [Vitamin D (oral)] : Vitamin D orally [Taking Steroids] : a history of taking steroids [FreeTextEntry1] : Pt returns for a follow-up visit for steroid-induced osteoporosis. No interval health changes. No major surgeries, hospitalizations, fractures or changes in medication. Up to date with dentist. No major dental work planned.  Pt prev seen by Dr Trevizo. Osteoporosis: Pt has low bone density and is being treated with Prolia. Began Prolia July 2022, tolerating well.   Hx of nonischemic cardiomyopathy and heart failure s/p LVAD and heart transplant (2018) on chronic prednisone, and hx autoimmune hemolytic anemia. The patient received a heart transplant in February 23, 2018. The patient had left hip surgery years ago from arthritis. Denies any fracture history, calcium disorders, or kidney stones. No family history of osteoporosis. Patient does take chronic prednisone, currently 15 mg/day. Patient does not drink milk, rarely eats cheese, no yogurt, no fish. Mostly eats chicken, beef. He does takes calcium and vitamin D. He does not regularly visit the dentist, has not seen in a few years.  Patient is currently taking prednisone 15 mg/day. BMD May 2019 with femoral neck T score of -2.1, total hip T score of -1.2 and spine T score of -1.1. Repeat BMD 6/2021 demonstrates femoral neck T score of -1.5 (improving osteopenia), total hip T-score of -0.4 (normal), and spine T-score of -0.8 .  The patient elected to hold any osteoporosis therapy and wait for repeat bone density.  No interval fractures.

## 2024-02-20 NOTE — END OF VISIT
[FreeTextEntry3] :  This note was written by Nga Arteaga on (February 20, 2024) acting as a medical scribe for Dr. Clemens This note was authored by the medical scribe for me. I have reviewed, edited, and revised the note as needed. I am in agreement with the exam findings, imaging findings, and treatment plan.  Ricardo Clemens MD

## 2024-02-20 NOTE — PHYSICAL EXAM
[Alert] : alert [Well Nourished] : well nourished [No Acute Distress] : no acute distress [Normal Sclera/Conjunctiva] : normal sclera/conjunctiva [EOMI] : extra ocular movement intact [No Proptosis] : no proptosis [Normal Outer Ear/Nose] : the ears and nose were normal in appearance [No Neck Mass] : no neck mass was observed [No Respiratory Distress] : no respiratory distress [No Accessory Muscle Use] : no accessory muscle use [Normal Rate and Effort] : normal respiratory rate and effort [Clear to Auscultation] : lungs were clear to auscultation bilaterally [Normal S1, S2] : normal S1 and S2 [Normal Rate] : heart rate was normal [Regular Rhythm] : with a regular rhythm [Not Tender] : non-tender [Not Distended] : not distended [Soft] : abdomen soft [No Rash] : no rash [Oriented x3] : oriented to person, place, and time [Normal Affect] : the affect was normal [Normal Insight/Judgement] : insight and judgment were intact [Normal Mood] : the mood was normal [de-identified] : soft sytolic murmur [de-identified] : Gouty tophi fingers

## 2024-02-20 NOTE — REVIEW OF SYSTEMS
Abnormal blood level of iron R79.0    Imaging of gastrointestinal tract abnormal R93.3    Pancreatic abnormality Q45.3       Past Medical History:        Diagnosis Date    Acute pancreatitis     Cat esophagus     GERD (gastroesophageal reflux disease)     with Barretts    Hashimoto's thyroiditis     Heart murmur     Hypertension     Low blood sugar     h/o when overweight in the past    MVP (mitral valve prolapse)     Myalgia     Right flank pain     RUQ pain        Past Surgical History:        Procedure Laterality Date     SECTION      x 3    CHOLECYSTECTOMY  1997    COLONOSCOPY  years ago    Dheeraj Deleon Mater per patient    COLONOSCOPY  14    Dr Surinder Nolasco      right    HERNIA REPAIR      hiatal    HERNIA REPAIR      umbilical    CA EGD INTRMURAL NEEDLE ASPIR/BIOP ALTERED ANATOMY N/A 2018    Dr Yolis Almazan w/fna-Dilation of main pancreatic duct with diffuse change in the pancreatitis noted, area of concern in the neck of the pancreas-strongly suspicious for adenocarcinoma     UPPER GASTROINTESTINAL ENDOSCOPY  years ago    Dheeraj Randall    UPPER GASTROINTESTINAL ENDOSCOPY  2013    [place]       Social History:    Social History   Substance Use Topics    Smoking status: Current Every Day Smoker     Packs/day: 1.00     Years: 10.00    Smokeless tobacco: Never Used    Alcohol use Yes                                Ready to quit: Not Answered  Counseling given: Not Answered      Vital Signs (Current): There were no vitals filed for this visit.                                            BP Readings from Last 3 Encounters:   18 118/67   18 134/75   18 128/78       NPO Status:                                                                                 BMI:   Wt Readings from Last 3 Encounters:   18 147 lb (66.7 kg)   18 154 lb (69.9 kg)   14 161 lb 9.6 oz (73.3 kg)     There is no height [Negative] : Heme/Lymph

## 2024-02-20 NOTE — ASSESSMENT
[Denosumab Therapy] : Risks  and benefits of denosumab therapy were discussed with the patient including eczema, cellulitis, osteonecrosis of the jaw and atypical femur fractures [FreeTextEntry1] : 72 y/o man with hx of nonischemic cardiomyopathy and heart failure s/p LVAD and heart transplant (2018) on chronic prednisone, and hx autoimmune hemolytic anemia, here for follow up for osteopenia.   Patient is currently taking prednisone 4mg/day. BMD May 2019 with femoral neck T score of -2.1, total hip T score of -1.2 and spine T score of -1.1. Repeat BMD 6/2021 demonstrates femoral neck T score of -1.5 (improving osteopenia), total hip T-score of -0.4 (normal), and spine T-score of -0.8 (normal).  BMD 2022 shows some decrease in bone density in the femoral neck into the osteoporosis range. Pt began Prolia July 2022, tolerating well. BMD 08/2023 indicates improved osteopenia in the spine, stable osteopenia in total hip, stable osteoporosis in fem neck, increased normal prox. radius. Continue Prolia, buy and bill.   Labs: 02/2024 Creatinine: 1.72 Calcium: 9.4  Follow up in 6 months.

## 2024-02-20 NOTE — PROCEDURE
[FreeTextEntry1] :  Bone Mineral Density: 08/15/2023 Indication: Comparison to 2020, assess response to medication Spine: -1.3, osteopenia, +12.2% Total hip: -1.7, osteopenia, no significant change Femoral neck: -2.5, osteoporosis, no significant change Proximal radius: -0.6, normal, +3.0%  Bone Density July 13,2022 Spine L1- L2 -2.3 osteopenia  Total Hip-1.9 osteopenia  Femoral Neck -2.6 osteoporosis  Proximal Radius -1.1 osteopenia   Bone Density June 2021 Spine-0.8 normal  Total Hip-0.4 nomal  Femoral Neck -1.5 osteopenia  Proximal Radius : not performed  BMD May 2019  femoral neck -2.1 osteopenia  total hip  -1.2  osteopenia  spine -1.1 osteopenia

## 2024-02-23 ENCOUNTER — RX CHANGE (OUTPATIENT)
Age: 74
End: 2024-02-23

## 2024-02-23 ENCOUNTER — OUTPATIENT (OUTPATIENT)
Dept: OUTPATIENT SERVICES | Facility: HOSPITAL | Age: 74
LOS: 1 days | Discharge: HOME | End: 2024-02-23

## 2024-02-23 DIAGNOSIS — D64.9 ANEMIA, UNSPECIFIED: ICD-10-CM

## 2024-02-23 DIAGNOSIS — Z94.1 HEART TRANSPLANT STATUS: Chronic | ICD-10-CM

## 2024-02-23 LAB — URATE SERPL-MCNC: 8.5 MG/DL

## 2024-02-29 NOTE — PROGRESS NOTE ADULT - PROBLEM SELECTOR PLAN 1
- Continue Imipenem while inpatient per ID recommendations. Plan will be for total of six months of therapy, which we will transition to Ertapenem at discharge - likely tomorrow to [facility]  - Will plan for repeat CT scan in 1 week to assess for interval improvement complains of pain/discomfort

## 2024-03-06 ENCOUNTER — APPOINTMENT (OUTPATIENT)
Dept: HEMATOLOGY ONCOLOGY | Facility: CLINIC | Age: 74
End: 2024-03-06
Payer: MEDICARE

## 2024-03-06 ENCOUNTER — RESULT REVIEW (OUTPATIENT)
Age: 74
End: 2024-03-06

## 2024-03-06 VITALS
WEIGHT: 182.98 LBS | HEART RATE: 88 BPM | SYSTOLIC BLOOD PRESSURE: 148 MMHG | RESPIRATION RATE: 16 BRPM | TEMPERATURE: 97.9 F | BODY MASS INDEX: 30.45 KG/M2 | OXYGEN SATURATION: 98 % | DIASTOLIC BLOOD PRESSURE: 83 MMHG

## 2024-03-06 LAB
ALBUMIN SERPL ELPH-MCNC: 3.8 G/DL
ALP BLD-CCNC: 47 U/L
ALT SERPL-CCNC: 18 U/L
ANION GAP SERPL CALC-SCNC: 9 MMOL/L
AST SERPL-CCNC: 17 U/L
BASOPHILS # BLD AUTO: 0.02 K/UL — SIGNIFICANT CHANGE UP (ref 0–0.2)
BASOPHILS NFR BLD AUTO: 0.2 % — SIGNIFICANT CHANGE UP (ref 0–2)
BILIRUB SERPL-MCNC: 0.6 MG/DL
BUN SERPL-MCNC: 39 MG/DL
CALCIUM SERPL-MCNC: 9 MG/DL
CHLORIDE SERPL-SCNC: 106 MMOL/L
CO2 SERPL-SCNC: 25 MMOL/L
CREAT SERPL-MCNC: 1.53 MG/DL
EGFR: 48 ML/MIN/1.73M2
EOSINOPHIL # BLD AUTO: 0.03 K/UL — SIGNIFICANT CHANGE UP (ref 0–0.5)
EOSINOPHIL NFR BLD AUTO: 0.3 % — SIGNIFICANT CHANGE UP (ref 0–6)
GLUCOSE SERPL-MCNC: 86 MG/DL
HCT VFR BLD CALC: 36.9 % — LOW (ref 39–50)
HGB BLD-MCNC: 11 G/DL — LOW (ref 13–17)
IMM GRANULOCYTES NFR BLD AUTO: 0.8 % — SIGNIFICANT CHANGE UP (ref 0–0.9)
LYMPHOCYTES # BLD AUTO: 1.82 K/UL — SIGNIFICANT CHANGE UP (ref 1–3.3)
LYMPHOCYTES # BLD AUTO: 18.5 % — SIGNIFICANT CHANGE UP (ref 13–44)
MCHC RBC-ENTMCNC: 29.2 PG — SIGNIFICANT CHANGE UP (ref 27–34)
MCHC RBC-ENTMCNC: 29.8 G/DL — LOW (ref 32–36)
MCV RBC AUTO: 97.9 FL — SIGNIFICANT CHANGE UP (ref 80–100)
MONOCYTES # BLD AUTO: 0.98 K/UL — HIGH (ref 0–0.9)
MONOCYTES NFR BLD AUTO: 9.9 % — SIGNIFICANT CHANGE UP (ref 2–14)
NEUTROPHILS # BLD AUTO: 6.92 K/UL — SIGNIFICANT CHANGE UP (ref 1.8–7.4)
NEUTROPHILS NFR BLD AUTO: 70.3 % — SIGNIFICANT CHANGE UP (ref 43–77)
NRBC # BLD: 0 /100 WBCS — SIGNIFICANT CHANGE UP (ref 0–0)
PLATELET # BLD AUTO: 202 K/UL — SIGNIFICANT CHANGE UP (ref 150–400)
POTASSIUM SERPL-SCNC: 4.7 MMOL/L
PROT SERPL-MCNC: 5.8 G/DL
RBC # BLD: 3.77 M/UL — LOW (ref 4.2–5.8)
RBC # FLD: 16.3 % — HIGH (ref 10.3–14.5)
SODIUM SERPL-SCNC: 139 MMOL/L
URATE SERPL-MCNC: 3 MG/DL
WBC # BLD: 9.85 K/UL — SIGNIFICANT CHANGE UP (ref 3.8–10.5)
WBC # FLD AUTO: 9.85 K/UL — SIGNIFICANT CHANGE UP (ref 3.8–10.5)

## 2024-03-06 PROCEDURE — 99213 OFFICE O/P EST LOW 20 MIN: CPT

## 2024-03-07 NOTE — HISTORY OF PRESENT ILLNESS
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 Continues to feel stronger and is more active.   Denies fatigue, fevers, chills, night sweats, palpitations, pain, bleeding, edema, LOBATO. Is being followed by cardiac transplant team.  Has full time aide now. No longer using walker.   Has R 3rd finger swelling and pain with tophi noted, is taking Colchicine as per rheum.

## 2024-03-07 NOTE — REVIEW OF SYSTEMS
[Negative] : Allergic/Immunologic [SOB on Exertion] : no shortness of breath during exertion [FreeTextEntry9] : R 3rd finger swelling and pain with tophi

## 2024-03-07 NOTE — ASSESSMENT
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 Was in remission with well compensated hemolytic anemia on low dose prednisone, but relapsed after tapered to 3 mg daily. 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  Hgb is 11.0 today, will continue Prednisone 15 mg daily.  Has gout flare of R3rd finger, is taking Colchicine as per rheum.  Colchicine can lower Hgb, will monitor.  Hgb is stable for now.     Plan: Prednisone 15 mg daily Monitor counts while on Colchicine.  PCP prophylaxis as per Cardiology- is taking Bactrim CMP, LDH Tacrolimus/ ASA per Cardiology. Folic acid To ER prn RTC in one month.

## 2024-03-07 NOTE — PHYSICAL EXAM
[Restricted in physically strenuous activity but ambulatory and able to carry out work of a light or sedentary nature] : Status 1- Restricted in physically strenuous activity but ambulatory and able to carry out work of a light or sedentary nature, e.g., light house work, office work [Normal] : grossly intact [de-identified] : R 3rd finger swollen with tophi noted [de-identified] : RRR. S1S2 normal. Gr 2/6 holosystolic and holodiastolic murmur LLSB. No gallops.

## 2024-03-07 NOTE — ED ADULT NURSE NOTE - NSFALLRISKFACTORS_ED_ALL_ED
Patient calls back wondering when this is going to be addressed. She is notified the on call provider is seeing patients and will address when he has a moment. She is notified we will call her either way.    No indicators present

## 2024-03-18 ENCOUNTER — RX RENEWAL (OUTPATIENT)
Age: 74
End: 2024-03-18

## 2024-03-18 RX ORDER — LOSARTAN POTASSIUM 25 MG/1
25 TABLET, FILM COATED ORAL
Qty: 28 | Refills: 5 | Status: ACTIVE | COMMUNITY
Start: 2023-03-16 | End: 1900-01-01

## 2024-03-19 ENCOUNTER — RX RENEWAL (OUTPATIENT)
Age: 74
End: 2024-03-19

## 2024-03-20 NOTE — DISCHARGE NOTE ADULT - NS AS DC FU [facility] CONTRAINDICATIONS ACEI/ARB MEDS
I received sign-out on this patient.  This is a 49-year-old male that fell off of a swelling stool, bilateral fall onto outstretched hands, did hit his head but did not lose consciousness.  He has bilateral wrist deformities.    At time of sign-out, x-rays and CT of the head were pending for final disposition.    I was contacted by Radiology, the patient does have a subtle subarachnoid in the posterior left sylvian fissure and peripheral right temporal lobe.  I reassessed the patient, he has no focal neuro deficit and does not take blood thinners.  He does not have any abdominal pain or tenderness.     He declines pain medication at this time for his bilateral wrist fractures.    I have reviewed the x-rays, bilateral distal radius fractures.  Orthopedic surgery consulted, awaiting their recommendations.    10:54 PM  Case discussed with Neurosurgery.  No need for ICU at this time.  Will plan for repeat CT per their recommendations.    Chest x-ray was read as free air under the diaphragm verses gastric bubble, patient does not have abdominal pain or tenderness, I disagree with this interpretation.  No concern for perforated viscus at this time.    Orthopedic surgery has performed reductions.  Admitted to Internal Medicine.  
Patient is a 89y old  Female who presents with a chief complaint of asthma exacerbation)        MEDICATIONS  (STANDING):  albuterol/ipratropium for Nebulization 3 milliLiter(s) Nebulizer every 4 hours  aspirin  chewable 81 milliGRAM(s) Oral daily  atorvastatin 40 milliGRAM(s) Oral at bedtime  benzonatate 100 milliGRAM(s) Oral three times a day  budesonide 160 MICROgram(s)/formoterol 4.5 MICROgram(s) Inhaler 2 Puff(s) Inhalation two times a day  enoxaparin Injectable 40 milliGRAM(s) SubCutaneous every 24 hours  guaiFENesin  milliGRAM(s) Oral every 12 hours  levothyroxine 50 MICROGram(s) Oral daily  losartan 100 milliGRAM(s) Oral daily  methylPREDNISolone sodium succinate Injectable 60 milliGRAM(s) IV Push every 8 hours  metoprolol succinate ER 50 milliGRAM(s) Oral daily  montelukast 10 milliGRAM(s) Oral daily    MEDICATIONS  (PRN):  acetaminophen     Tablet .. 650 milliGRAM(s) Oral every 6 hours PRN Temp greater or equal to 38C (100.4F), Mild Pain (1 - 3)  albuterol    90 MICROgram(s) HFA Inhaler 2 Puff(s) Inhalation every 6 hours PRN Shortness of Breath and/or Wheezing  artificial tears (preservative free) Ophthalmic Solution 1 Drop(s) Both EYES three times a day PRN Dry Eyes  dibucaine 1% Ointment 1 Application(s) Topical two times a day PRN hemorrhoids  fluticasone propionate 50 MICROgram(s)/spray Nasal Spray 1 Spray(s) Both Nostrils two times a day PRN for allergy symptoms    CAPILLARY BLOOD GLUCOSE  I&O's Summary      PHYSICAL EXAM:  Vital Signs Last 24 Hrs  T(C): 35.6 (19 Mar 2024 13:30), Max: 36.8 (19 Mar 2024 05:17)  T(F): 96.1 (19 Mar 2024 13:30), Max: 98.3 (19 Mar 2024 05:17)  HR: 77 (19 Mar 2024 13:30) (77 - 85)  BP: 141/63 (19 Mar 2024 13:30) (126/60 - 141/63)  BP(mean): --  RR: 17 (19 Mar 2024 13:30) (17 - 18)  SpO2: 98% (19 Mar 2024 13:30) (94% - 98%)    Parameters below as of 19 Mar 2024 13:30  Patient On (Oxygen Delivery Method): room air      GENERAL: No acute distress, well-developed  HEAD:  Atraumatic, Normocephalic  EYES: conjunctiva and sclera clear  NECK: Supple, , no JVD  CHEST/LUNG: inspiratory and expiratory wheezing, cough with congestion   HEART: Regular rate and rhythm; No murmurs, rubs, or gallops  ABDOMEN: Soft, non-tender, non-distended; normal bowel sounds,  EXTREMITIES:  2+ peripheral pulses b/l, No clubbing, cyanosis, or edema  NEUROLOGY: A&O x 3, no focal deficits  SKIN: No rashes or lesions    LABS:                        9.4    14.70 )-----------( 340      ( 19 Mar 2024 07:26 )             28.7     03-19    141  |  106  |  20  ----------------------------<  126<H>  4.6   |  25  |  0.8    Ca    9.6      19 Mar 2024 07:26  Mg     2.2     03-19    TPro  5.6<L>  /  Alb  3.8  /  TBili  0.2  /  DBili  x   /  AST  12  /  ALT  16  /  AlkPhos  91  03-19      Urinalysis Basic - ( 19 Mar 2024 07:26 )    Color: x / Appearance: x / SG: x / pH: x  Gluc: 126 mg/dL / Ketone: x  / Bili: x / Urobili: x   Blood: x / Protein: x / Nitrite: x   Leuk Esterase: x / RBC: x / WBC x   Sq Epi: x / Non Sq Epi: x / Bacteria: x      
  [facility]  89[facility]      Patient is a 89y old  Female who presents with a chief complaint of asthma exacerbation (19 Mar 2024 17:26)      INTERVAL HPI/OVERNIGHT EVENTS:        REVIEW OF SYSTEMS:        FAMILY HISTORY:  Family history of essential hypertension (Mother)    Family history of stroke (Mother)      T(C): 36.2 (03-20-24 @ 05:37), Max: 36.7 (03-19-24 @ 20:24)  HR: 70 (03-20-24 @ 05:37) (70 - 89)  BP: 164/70 (03-20-24 @ 05:37) (139/62 - 164/70)  RR: 18 (03-20-24 @ 05:37) (17 - 18)  SpO2: 97% (03-20-24 @ 05:37) (97% - 98%)  Wt(kg): --Vital Signs Last 24 Hrs  T(C): 36.2 (20 Mar 2024 05:37), Max: 36.7 (19 Mar 2024 20:24)  T(F): 97.2 (20 Mar 2024 05:37), Max: 98 (19 Mar 2024 20:24)  HR: 70 (20 Mar 2024 05:37) (70 - 89)  BP: 164/70 (20 Mar 2024 05:37) (139/62 - 164/70)  BP(mean): --  RR: 18 (20 Mar 2024 05:37) (17 - 18)  SpO2: 97% (20 Mar 2024 05:37) (97% - 98%)    Parameters below as of 20 Mar 2024 05:37  Patient On (Oxygen Delivery Method): room air        PHYSICAL EXAM:  GENERAL: NAD, well-groomed, well-developed  NERVOUS SYSTEM:  Alert & Oriented X3,  PULM: Clear to auscultation bilaterally  CARDIAC: Regular rate and rhythm;   GI: Soft, Nontender, Nondistended; Bowel sounds present  EXTREMITIES:  2+ Peripheral Pulses,     Consultant(s) Notes Reviewed:  [x ] YES  [ ] NO  Care Discussed with Consultants/Other Providers [ x] YES  [ ] NO    LABS:                            9.8    19.36 )-----------( 364      ( 20 Mar 2024 06:10 )             30.0   03-19    141  |  106  |  20  ----------------------------<  126<H>  4.6   |  25  |  0.8    Ca    9.6      19 Mar 2024 07:26  Mg     2.2     03-19    TPro  5.6<L>  /  Alb  3.8  /  TBili  0.2  /  DBili  x   /  AST  12  /  ALT  16  /  AlkPhos  91  03-19            Culture - Blood (collected 18 Mar 2024 11:30)  Source: .Blood Blood  Preliminary Report (19 Mar 2024 22:01):    No growth at 24 hours    Culture - Blood (collected 18 Mar 2024 11:30)  Source: .Blood Blood  Preliminary Report (19 Mar 2024 22:01):    No growth at 24 hours      acetaminophen     Tablet .. 650 milliGRAM(s) Oral every 6 hours PRN  acetylcysteine 20%  Inhalation 4 milliLiter(s) Inhalation every 6 hours  albuterol    90 MICROgram(s) HFA Inhaler 2 Puff(s) Inhalation every 6 hours PRN  albuterol/ipratropium for Nebulization 3 milliLiter(s) Nebulizer every 6 hours  artificial tears (preservative free) Ophthalmic Solution 1 Drop(s) Both EYES three times a day PRN  aspirin  chewable 81 milliGRAM(s) Oral daily  atorvastatin 40 milliGRAM(s) Oral at bedtime  benzonatate 100 milliGRAM(s) Oral three times a day  budesonide 160 MICROgram(s)/formoterol 4.5 MICROgram(s) Inhaler 2 Puff(s) Inhalation two times a day  dibucaine 1% Ointment 1 Application(s) Topical two times a day PRN  enoxaparin Injectable 40 milliGRAM(s) SubCutaneous every 24 hours  fluticasone propionate 50 MICROgram(s)/spray Nasal Spray 1 Spray(s) Both Nostrils two times a day PRN  guaifenesin/dextromethorphan Oral Liquid 10 milliLiter(s) Oral every 6 hours  levothyroxine 50 MICROGram(s) Oral daily  losartan 100 milliGRAM(s) Oral daily  methylPREDNISolone sodium succinate Injectable 40 milliGRAM(s) IV Push every 12 hours  metoprolol succinate ER 50 milliGRAM(s) Oral daily  montelukast 10 milliGRAM(s) Oral daily  pantoprazole   Suspension 40 milliGRAM(s) Oral daily      This is a 89 year old female with hx of asthma, HTN, hypothyroidism, afib s/p watchman device presenting with one week of worsening cough and shortness of breath consistent with prior asthma flares. Patient presented to pulmonologist Dr. Reyes who prescribed 10 day course of prednisone and nebs. Patient does not feel better after 3 days of treatment.   Denies fever, chills, cp, abdominal pain, n/v/d, palpitations. + hemorrhoidal hematochezia    1. Acute on Chronic Asthma exacerbation  - Admit to medicine                    -CXR: WNL                      -Respiratory viral panel: negative   -solumedrol 60 q8>>start 40mg Q12 tomorrow (pt still wheezing)   -continue with Duonebs inhalers, singular and antitussives     2. Chronic AFIB s/p watchman + Hypotension /BP well controlled   - c/w Aspirin, ARB and  BB     3. Hypothyroidism:  cont levothyroxine 50 mcg    4. Hemorrhoids:  dibucaine BID prn, bowel regimen     5. Glaucoma:  latanoprost and artificial tears    DVT PPX: Lovenox  GERD + GI PPX: PPI while on steroids as well   Code status: full code  Dispo: acute from home    
worsening renal function/renal disease/dysfunction

## 2024-04-02 ENCOUNTER — APPOINTMENT (OUTPATIENT)
Dept: RHEUMATOLOGY | Facility: CLINIC | Age: 74
End: 2024-04-02
Payer: MEDICARE

## 2024-04-02 VITALS
WEIGHT: 177 LBS | DIASTOLIC BLOOD PRESSURE: 81 MMHG | SYSTOLIC BLOOD PRESSURE: 126 MMHG | HEIGHT: 65 IN | HEART RATE: 80 BPM | BODY MASS INDEX: 29.49 KG/M2 | OXYGEN SATURATION: 97 %

## 2024-04-02 DIAGNOSIS — M79.675 PAIN IN LEFT TOE(S): ICD-10-CM

## 2024-04-02 PROCEDURE — G2211 COMPLEX E/M VISIT ADD ON: CPT

## 2024-04-02 PROCEDURE — 99214 OFFICE O/P EST MOD 30 MIN: CPT

## 2024-04-10 ENCOUNTER — APPOINTMENT (OUTPATIENT)
Dept: HEMATOLOGY ONCOLOGY | Facility: CLINIC | Age: 74
End: 2024-04-10
Payer: MEDICARE

## 2024-04-10 ENCOUNTER — RESULT REVIEW (OUTPATIENT)
Age: 74
End: 2024-04-10

## 2024-04-10 VITALS
SYSTOLIC BLOOD PRESSURE: 144 MMHG | BODY MASS INDEX: 30.89 KG/M2 | OXYGEN SATURATION: 95 % | WEIGHT: 185.63 LBS | RESPIRATION RATE: 16 BRPM | TEMPERATURE: 97.5 F | HEART RATE: 91 BPM | DIASTOLIC BLOOD PRESSURE: 93 MMHG

## 2024-04-10 LAB
ALBUMIN SERPL ELPH-MCNC: 4.1 G/DL
ALP BLD-CCNC: 54 U/L
ALT SERPL-CCNC: 12 U/L
ANION GAP SERPL CALC-SCNC: 10 MMOL/L
AST SERPL-CCNC: 20 U/L
BASOPHILS # BLD AUTO: 0.03 K/UL — SIGNIFICANT CHANGE UP (ref 0–0.2)
BASOPHILS NFR BLD AUTO: 0.3 % — SIGNIFICANT CHANGE UP (ref 0–2)
BILIRUB SERPL-MCNC: 0.7 MG/DL
BUN SERPL-MCNC: 41 MG/DL
CALCIUM SERPL-MCNC: 9.5 MG/DL
CHLORIDE SERPL-SCNC: 110 MMOL/L
CO2 SERPL-SCNC: 25 MMOL/L
CREAT SERPL-MCNC: 1.57 MG/DL
EGFR: 46 ML/MIN/1.73M2
EOSINOPHIL # BLD AUTO: 0.05 K/UL — SIGNIFICANT CHANGE UP (ref 0–0.5)
EOSINOPHIL NFR BLD AUTO: 0.4 % — SIGNIFICANT CHANGE UP (ref 0–6)
GLUCOSE SERPL-MCNC: 94 MG/DL
HCT VFR BLD CALC: 36.6 % — LOW (ref 39–50)
HGB BLD-MCNC: 11.2 G/DL — LOW (ref 13–17)
IMM GRANULOCYTES NFR BLD AUTO: 1 % — HIGH (ref 0–0.9)
LDH SERPL-CCNC: 333 U/L
LYMPHOCYTES # BLD AUTO: 1.92 K/UL — SIGNIFICANT CHANGE UP (ref 1–3.3)
LYMPHOCYTES # BLD AUTO: 17.2 % — SIGNIFICANT CHANGE UP (ref 13–44)
MCHC RBC-ENTMCNC: 28.9 PG — SIGNIFICANT CHANGE UP (ref 27–34)
MCHC RBC-ENTMCNC: 30.6 G/DL — LOW (ref 32–36)
MCV RBC AUTO: 94.3 FL — SIGNIFICANT CHANGE UP (ref 80–100)
MONOCYTES # BLD AUTO: 1.12 K/UL — HIGH (ref 0–0.9)
MONOCYTES NFR BLD AUTO: 10 % — SIGNIFICANT CHANGE UP (ref 2–14)
NEUTROPHILS # BLD AUTO: 7.96 K/UL — HIGH (ref 1.8–7.4)
NEUTROPHILS NFR BLD AUTO: 71.1 % — SIGNIFICANT CHANGE UP (ref 43–77)
NRBC # BLD: 0 /100 WBCS — SIGNIFICANT CHANGE UP (ref 0–0)
PLATELET # BLD AUTO: 208 K/UL — SIGNIFICANT CHANGE UP (ref 150–400)
POTASSIUM SERPL-SCNC: 5 MMOL/L
PROT SERPL-MCNC: 6.3 G/DL
RBC # BLD: 3.88 M/UL — LOW (ref 4.2–5.8)
RBC # FLD: 17.7 % — HIGH (ref 10.3–14.5)
SODIUM SERPL-SCNC: 145 MMOL/L
WBC # BLD: 11.19 K/UL — HIGH (ref 3.8–10.5)
WBC # FLD AUTO: 11.19 K/UL — HIGH (ref 3.8–10.5)

## 2024-04-10 PROCEDURE — 99213 OFFICE O/P EST LOW 20 MIN: CPT

## 2024-04-10 NOTE — DIETITIAN INITIAL EVALUATION ADULT. - NS AS NUTRI INTERV FEED ASSISTANCE
Stroke, critical stenosis, evaluation for angiography Other (specify)/1. Provide food preferences as requested by Pt/family within diet restrictions  2. Encourage PO intake during meal times 3. Reviewed menu ordering procedures

## 2024-04-12 NOTE — HISTORY OF PRESENT ILLNESS
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 Continues to feel stronger and is more active.   Denies fatigue, fevers, chills, night sweats, palpitations, pain, bleeding, edema, LOBATO. Is being followed by cardiac transplant team.  Has full time aide now. No longer using walker.  Had stopped Colchicine for gout but swelling increased, is now back on it as per rheum.

## 2024-04-12 NOTE — PHYSICAL EXAM
[Restricted in physically strenuous activity but ambulatory and able to carry out work of a light or sedentary nature] : Status 1- Restricted in physically strenuous activity but ambulatory and able to carry out work of a light or sedentary nature, e.g., light house work, office work [Normal] : affect appropriate [de-identified] : RRR. S1S2 normal. Gr 2/6 holosystolic and holodiastolic murmur LLSB. No gallops. [de-identified] : R 3rd finger swollen with tophi noted

## 2024-04-12 NOTE — ASSESSMENT
[Palliative Care Plan] : not applicable at this time [FreeTextEntry1] : Assessment     74 YO M S/P cardiac transplant developed mixed type autoimmune hemolytic anemia while on immunosuppression. Was in remission with well compensated hemolytic anemia on low dose prednisone, but relapsed after tapered to 3 mg daily. 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  Hgb is 11.2 today, will continue Prednisone 15 mg daily.  Has gout flare of R 3rd finger, is taking Colchicine as per rheum.  Colchicine can lower Hgb, will monitor.  Hgb is stable for now.     Plan: Prednisone 15 mg daily Monitor counts while on Colchicine.  PCP prophylaxis as per Cardiology- is taking Bactrim CMP, LDH Tacrolimus/ ASA per Cardiology. Folic acid To ER prn RTC in one month.

## 2024-04-18 NOTE — HISTORY OF PRESENT ILLNESS
[___ Month(s) Ago] : [unfilled] month(s) ago [History of Tophi] : the patient has a history of tophi [FreeTextEntry1] : on feboxustat - uric acid is now 3 - less swelling and pain - no flares has multiple tophi formation over the hands reports pain over the left 5th toe - that is severe wheelchair bound - cannot walk long distances due to severe knee OA and ongoing SOB [FreeTextEntry3] : 2022 [FreeTextEntry4] : febuxostat

## 2024-04-18 NOTE — ASSESSMENT
[FreeTextEntry1] :  1 tophaceous gout -stopped febuxostat for many months (unsure why) with worsening tophaceous gout and severe gout flare now over the right hand and wrist - on feboxustat 40 mg with uric acid of 3 - continue with feboxustat -  referral to hand surgeon for large tophi fomation 2. CKD 3 - under the care of nephro - last egfr at 48 3. left toe callus - referral to podiatry 4. auto-immune anemia - on prednisone 15 mg daily Hg at 11.1 5,. wheelchair bound - referral to electric wheelchair - cannot walk more than 10 feet due to ongoing dyspnea - s/p  heart transplant I reviewed previous labs results with patients. labs done by PCP -  Diagnosis and Prognosis discussed Continue with current medications medications refilled education provided on gout flare F/u 3 months

## 2024-04-18 NOTE — PHYSICAL EXAM
[General Appearance - Alert] : alert [General Appearance - In No Acute Distress] : in no acute distress [Neck Appearance] : the appearance of the neck was normal [Neck Cervical Mass (___cm)] : no neck mass was observed [Jugular Venous Distention Increased] : there was no jugular-venous distention [Thyroid Diffuse Enlargement] : the thyroid was not enlarged [Thyroid Nodule] : there were no palpable thyroid nodules [Auscultation Breath Sounds / Voice Sounds] : lungs were clear to auscultation bilaterally [Heart Rate And Rhythm] : heart rate was normal and rhythm regular [Heart Sounds] : normal S1 and S2 [Heart Sounds Gallop] : no gallops [Murmurs] : no murmurs [Full Pulse] : the pedal pulses are present [Heart Sounds Pericardial Friction Rub] : no pericardial rub [Edema] : there was no peripheral edema [Bowel Sounds] : normal bowel sounds [Abdomen Tenderness] : non-tender [Abdomen Soft] : soft [Abdomen Mass (___ Cm)] : no abdominal mass palpated [Cervical Lymph Nodes Enlarged Posterior Bilaterally] : posterior cervical [Cervical Lymph Nodes Enlarged Anterior Bilaterally] : anterior cervical [Supraclavicular Lymph Nodes Enlarged Bilaterally] : supraclavicular [No CVA Tenderness] : no ~M costovertebral angle tenderness [No Spinal Tenderness] : no spinal tenderness [Skin Color & Pigmentation] : normal skin color and pigmentation [Skin Turgor] : normal skin turgor [] : no rash [Oriented To Time, Place, And Person] : oriented to person, place, and time [Impaired Insight] : insight and judgment were intact [Affect] : the affect was normal [FreeTextEntry1] : multiple deposits of topho over hands  and feet - left feet 5th toe with large callus formation

## 2024-04-18 NOTE — PROGRESS NOTE ADULT - SUBJECTIVE AND OBJECTIVE BOX
"HPI: Leola Felix swings, spotting on and off. Feels extremely bloated. Having increased BM, soft and going more often, feels nauseated after having one. Bad headaches, taking tylenol  Does have family history thyroid disease  , pt works at [employer] in the , has 6yo daughter, has had loss d/t fetal anomaly carried to term, and multiple miscarriages. Had IVF treatments multiple times, and daughter is result.    LMP:  Patient's last menstrual period was 2024. Has Mirena IUD  Abnormal bleeding: yes, BTB  Hot flashes/night sweats: yes, often right before periods  Sexually active: yes  STI concerns: no  Last mammogram: 2024, dx with US \"probably benign\", rec 6mo f/up mamm 6mo.   Last pap: 2019 normal and neg HPV  History of abnormal pap: no  Other gyn history: recurrent miscarriages, D&C   OB History    No obstetric history on file.      Past Medical History:  2016: Acute upper respiratory infection, unspecified      Comment:  Viral upper respiratory tract infection with cough  2016: Encounter for other procreative management      Comment:  Encounter for artificial insemination  2017: Encounter for pregnancy test, result unknown      Comment:  Encounter for pregnancy test  2018: Encounter for pregnancy test, result unknown      Comment:  Unconfirmed pregnancy  10/18/2016: Encounter for preprocedural laboratory examination      Comment:  Encounter for preprocedural laboratory examination  2016: Encounter for screening for diabetes mellitus      Comment:  Screening for diabetes mellitus  2016: Encounter for screening for lipoid disorders      Comment:  Screening cholesterol level  2016: Encounter for screening for other " [facility] DIVISION OF KIDNEY DISEASES AND HYPERTENSION -- [phone]   FOLLOW UP NOTE  --------------------------------------------------------------------------------  Chief Complaint: Heart failure      24hour events/Subjective: Pt seen and examined. No acute overnight events. Denies sob/ diarrhea.         PAST HISTORY  --------------------------------------------------------------------------------  No significant changes to PMH, PSH, FHx, SHx, unless otherwise noted    ALLERGIES & MEDICATIONS  --------------------------------------------------------------------------------  Allergies    No Known Allergies    Intolerances      Standing Inpatient Medications  sodium chloride 0.9%. 1000milliLiter(s) IV Continuous <Continuous>  docusate sodium 100milliGRAM(s) Oral three times a day  dextrose 5%. 1000milliLiter(s) IV Continuous <Continuous>  dextrose 50% Injectable 25Gram(s) IV Push once  chlorhexidine 0.12% Liquid 15milliLiter(s) Swish and Spit two times a day  pantoprazole  Injectable 40milliGRAM(s) IV Push every 24 hours  meropenem IVPB 500milliGRAM(s) IV Intermittent every 12 hours  insulin lispro (HumaLOG) corrective regimen sliding scale  SubCutaneous every 4 hours  milrinone Infusion 0.375MICROgram(s)/kG/Min IV Continuous <Continuous>  aspirin  chewable 81milliGRAM(s) Oral daily  heparin  Infusion 800Unit(s)/Hr IV Continuous <Continuous>  amiodarone    Tablet 200milliGRAM(s) Oral daily  vancomycin  IVPB  IV Intermittent   vancomycin  IVPB 1000milliGRAM(s) IV Intermittent every 24 hours    PRN Inpatient Medications      REVIEW OF SYSTEMS  --------------------------------------------------------------------------------  As above         VITALS/PHYSICAL EXAM  --------------------------------------------------------------------------------  T(C): 37.8, Max: 37.8 (06-20 @ 07:00)  HR: 115 (96 - 118)  BP: --  RR: 19 (17 - 29)  SpO2: 98% (98% - 100%)  Wt(kg): --      I & Os for 24h ending 06-20-17 @ 07:00  =============================================  IN: 3038.6 ml / OUT: 1660 ml / NET: 1378.6 ml    I & Os for current day (as of 06-20-17 @ 08:45)  =============================================  IN: 75.9 ml / OUT: 70 ml / NET: 5.9 ml    Physical Exam:  	Gen: NAD, sitting on chair, nasal cannula  	HEENT: MMM  	Pulm: CTA B/L  	CV: S1S2  	Abd: Soft, +BS  	Ext: + LE edema B/L                      Neuro: Awake   	Skin: Warm and Dry   	Other: +diaz    LABS/STUDIES  --------------------------------------------------------------------------------              7.9    17.3  >-----------<  157      [06-20-17 @ 01:15]              24.6     138  |  109  |  41  ----------------------------<  89      [06-20-17 @ 01:15]  3.2   |  18  |  1.69        Ca     6.1     [06-20-17 @ 01:15]      Mg     2.9     [06-19-17 @ 00:36]    TPro  4.4  /  Alb  2.1  /  TBili  3.2  /  DBili  x   /  AST  56  /  ALT  33  /  AlkPhos  104  [06-20-17 @ 01:15]    PT/INR: PT 12.3 , INR 1.14       [06-20-17 @ 01:15]  PTT: 47.8       [06-20-17 @ 01:15]          [06-20-17 @ 01:15]    Creatinine Trend:  SCr 1.69 [06-20 @ 01:15]  SCr 2.39 [06-19 @ 00:36]  SCr 2.64 [06-18 @ 00:20]  SCr 2.60 [06-17 @ 03:06]  SCr 2.69 [06-16 @ 13:12]    Urinalysis - [06-19-17 @ 10:01]      Color Yellow / Appearance SL Turbid / SG 1.014 / pH 6.0      Gluc Negative / Ketone Negative  / Bili Negative / Urobili 1       Blood Moderate / Protein 30 / Leuk Est Negative / Nitrite Negative      RBC 25-50 / WBC 0-2 / Hyaline 2-5 / Gran  / Sq Epi  / Non Sq Epi Occasional / Bacteria Negative      Iron 20, TIBC 352, %sat 6      [06-08-17 @ 07:14]  Ferritin 121.0      [06-09-17 @ 19:08]  HbA1c 5.5      [06-07-17 @ 06:14]  TSH 2.33      [06-14-17 @ 14:51]  Lipid: chol 62, TG 33, HDL 17, LDL 38      [06-07-17 @ 06:14] suspected endocrine   disorder      Comment:  Screening for thyroid disorder  10/30/2015: Missed  ([facility])      Comment:  Missed   2016: Other specified health status      Comment:  Birth control  2017: Personal history of other complications of pregnancy,   childbirth and the puerperium      Comment:  History of spontaneous   2017: Personal history of other diseases of the female genital   tract      Comment:  History of irregular menstrual cycles  2017: Personal history of other diseases of the female genital   tract      Comment:  History of abnormal uterine bleeding  2016: Personal history of other diseases of the female genital   tract      Comment:  History of female infertility  2016: Personal history of other diseases of the respiratory   system      Comment:  History of sore throat  10/07/2016: Personal history of other specified conditions      Comment:  History of postnasal drip  No date: Pregnancy care for patient with recurrent pregnancy loss,   unspecified trimester ([facility])      Comment:  Recurrent pregnancy loss, antepartum  2017: Recurrent pregnancy loss      Comment:  History of recurrent miscarriages, not currently                pregnant   Past Surgical History:  10/30/2014: DILATION AND CURETTAGE OF UTERUS      Comment:  Dilation And Curettage   Social History    Socioeconomic History      Marital status:       Spouse name: Not on file      Number of children: Not on file      Years of education: Not on file      Highest education level: Not on file    Occupational History      Not on file    Tobacco Use      Smoking status: Every Day        Types: Cigarettes      Smokeless tobacco: Never    Substance and Sexual Activity      Alcohol use: Not on file      Drug use: Not on file      Sexual activity: Not on file    Other Topics      Concerns:        Not on file    Social History Narrative      Not on file    Social  Determinants of Health  Financial Resource Strain: Not on file  Food Insecurity: Not on file  Transportation Needs: Not on file  Physical Activity: Not on file  Stress: Not on file  Social Connections: Not on file  Intimate Partner Violence: Not on file  Housing Stability: Not on file   No family history on file.     Current Outpatient Medications:   clindamycin (Cleocin T) 1 % lotion, apply to the full face every morning. okay to use again at night as a spot treatment, Disp: , Rfl:   spironolactone (Aldactone) 50 mg tablet, TAKE ONE TABLET BY MOUTH EVERY DAY WITH PLENTY OF WATER, Disp: , Rfl:   tretinoin (Retin-A) 0.025 % cream, APPLY A PEA SIZED AMOUNT TO ENTIRE FACE EVERY NIGHT AS TOLERATED, Disp: , Rfl:   venlafaxine XR (Effexor-XR) 150 mg 24 hr capsule, Take 1 capsule (150 mg) by mouth once daily., Disp: , Rfl:   Xanax 0.5 mg tablet, Take 1 tablet (0.5 mg) by mouth once daily., Disp: , Rfl:           REVIEW OF SYSTEMS:  Breast. denies masses, nipple discharge  : denies dysuria, hematuria incontinence   Gl: denies change in bowel habits, blood in stools      PHYSICAL EXAM:  Vitals: BP (!) 130/96   Pulse 105   Temp 36.4 °C (97.6 °F)   Wt 65.9 kg (145 lb 3.2 oz)   LMP 03/14/2024   BMI 24.91 kg/m²   Gen: well appearing woman in NAD  HEENT: normocephalic, thyroid not enlarged, no lymphadenopathy  CV: no m/r/g  Chest: CTAB, no w/r/r  Breast: normal tissue bilaterally without masses, skin changes, lymphadenopathy   Abdomen: soft, nontender, no masses/hernias, liver and spleen not enlarged   Pelvic:  - external genitalia: normal  - vagina: normal; atrophic changes noted  - cervix: normal; +IUD strings noted at os  - uterus: normal size, nontender  - adnexa: no palpable masses or tenderness  RECTAL: no external hemorrhoids; rectal exam deferred    ASSESSMENT/PLAN :    1) Health maintenance, Well-woman exam.  Pap done with HPV.  Mammogram up to date, has 6mo follow-up  Nutrition, exercise and routine health  maintenance exams reviewed.  Calcium/Vitamin D supplementation information provided   Smoking cessation: +smoker, discussed and pt states she can quit and will consider  2) Possibly perimenopausal: multiple concerns of mood changes, hot flashes, spotting intermittently, bloating, all seems to correlate with BTB. Discussed may consider switching out IUD, or OCPs if quits smoking.  Lab testing ordered, r/o hormonal or vitamin deficiency, anemia  3) STD screening: declines, no concerns  4) Follow up one year or sooner as needed

## 2024-04-24 RX ORDER — COLCHICINE 0.6 MG/1
0.6 CAPSULE ORAL
Qty: 15 | Refills: 0 | Status: ACTIVE | COMMUNITY
Start: 2024-02-12 | End: 1900-01-01

## 2024-05-01 ENCOUNTER — OUTPATIENT (OUTPATIENT)
Dept: OUTPATIENT SERVICES | Facility: HOSPITAL | Age: 74
LOS: 1 days | Discharge: HOME | End: 2024-05-01

## 2024-05-01 DIAGNOSIS — D64.9 ANEMIA, UNSPECIFIED: ICD-10-CM

## 2024-05-01 DIAGNOSIS — Z94.1 HEART TRANSPLANT STATUS: Chronic | ICD-10-CM

## 2024-05-01 NOTE — CONSULT NOTE ADULT - PROBLEM SELECTOR PROBLEM 5
Deep vein thrombosis (DVT) of other vein of upper extremity all other ROS negative except as per HPI

## 2024-05-06 ENCOUNTER — APPOINTMENT (OUTPATIENT)
Dept: HEART FAILURE | Facility: CLINIC | Age: 74
End: 2024-05-06
Payer: MEDICARE

## 2024-05-06 VITALS
TEMPERATURE: 97.2 F | SYSTOLIC BLOOD PRESSURE: 136 MMHG | BODY MASS INDEX: 30.16 KG/M2 | OXYGEN SATURATION: 97 % | HEART RATE: 90 BPM | WEIGHT: 181 LBS | HEIGHT: 65 IN | DIASTOLIC BLOOD PRESSURE: 88 MMHG

## 2024-05-06 DIAGNOSIS — M10.9 GOUT, UNSPECIFIED: ICD-10-CM

## 2024-05-06 DIAGNOSIS — I10 ESSENTIAL (PRIMARY) HYPERTENSION: ICD-10-CM

## 2024-05-06 DIAGNOSIS — D69.41 EVANS SYNDROME: ICD-10-CM

## 2024-05-06 DIAGNOSIS — Z94.1 HEART TRANSPLANT STATUS: ICD-10-CM

## 2024-05-06 DIAGNOSIS — D84.9 IMMUNODEFICIENCY, UNSPECIFIED: ICD-10-CM

## 2024-05-06 PROCEDURE — 99214 OFFICE O/P EST MOD 30 MIN: CPT

## 2024-05-06 RX ORDER — PATIROMER 16.8 G/1
16.8 POWDER, FOR SUSPENSION ORAL DAILY
Qty: 30 | Refills: 5 | Status: DISCONTINUED | COMMUNITY
Start: 2023-07-25 | End: 2024-05-06

## 2024-05-06 NOTE — DIETITIAN INITIAL EVALUATION ADULT. - EST PROTEIN NEEDS5
Spoke to pt, states for the last several days her nausea has been getting progressively worse especially in the afternoons and evenings. She vomited the last 2 afternoons. She was advised on hydration and taking advantage of the times she feels good to eat nutrient dense foods. Discussed B6 and unisom. She will call back in a few days if these suggestions are not helping.   75.3

## 2024-05-07 LAB
ALBUMIN SERPL ELPH-MCNC: 4 G/DL
ALP BLD-CCNC: 51 U/L
ALT SERPL-CCNC: 14 U/L
ANION GAP SERPL CALC-SCNC: 14 MMOL/L
AST SERPL-CCNC: 19 U/L
BASOPHILS # BLD AUTO: 0.02 K/UL
BASOPHILS NFR BLD AUTO: 0.2 %
BILIRUB SERPL-MCNC: 0.8 MG/DL
BUN SERPL-MCNC: 38 MG/DL
CALCIUM SERPL-MCNC: 8.8 MG/DL
CHLORIDE SERPL-SCNC: 106 MMOL/L
CHOLEST SERPL-MCNC: 139 MG/DL
CO2 SERPL-SCNC: 24 MMOL/L
CREAT SERPL-MCNC: 1.46 MG/DL
EGFR: 50 ML/MIN/1.73M2
EOSINOPHIL # BLD AUTO: 0.04 K/UL
EOSINOPHIL NFR BLD AUTO: 0.4 %
GLUCOSE SERPL-MCNC: 80 MG/DL
HCT VFR BLD CALC: 41.3 %
HDLC SERPL-MCNC: 44 MG/DL
HGB BLD-MCNC: 11.9 G/DL
IMM GRANULOCYTES NFR BLD AUTO: 0.6 %
LDLC SERPL CALC-MCNC: 68 MG/DL
LYMPHOCYTES # BLD AUTO: 1.94 K/UL
LYMPHOCYTES NFR BLD AUTO: 20.1 %
MAGNESIUM SERPL-MCNC: 2 MG/DL
MAN DIFF?: NORMAL
MCHC RBC-ENTMCNC: 27.9 PG
MCHC RBC-ENTMCNC: 28.8 GM/DL
MCV RBC AUTO: 96.7 FL
MONOCYTES # BLD AUTO: 0.94 K/UL
MONOCYTES NFR BLD AUTO: 9.7 %
NEUTROPHILS # BLD AUTO: 6.65 K/UL
NEUTROPHILS NFR BLD AUTO: 69 %
NONHDLC SERPL-MCNC: 94 MG/DL
PLATELET # BLD AUTO: 208 K/UL
POTASSIUM SERPL-SCNC: 4.9 MMOL/L
PROT SERPL-MCNC: 6.2 G/DL
RBC # BLD: 4.27 M/UL
RBC # FLD: 17.7 %
SODIUM SERPL-SCNC: 145 MMOL/L
TACROLIMUS SERPL-MCNC: 6.6 NG/ML
TRIGL SERPL-MCNC: 155 MG/DL
WBC # FLD AUTO: 9.65 K/UL

## 2024-05-09 ENCOUNTER — APPOINTMENT (OUTPATIENT)
Dept: ORTHOPEDIC SURGERY | Facility: CLINIC | Age: 74
End: 2024-05-09
Payer: MEDICARE

## 2024-05-09 PROCEDURE — 73140 X-RAY EXAM OF FINGER(S): CPT | Mod: F7

## 2024-05-09 PROCEDURE — 11730 AVULSION NAIL PLATE SIMPLE 1: CPT | Mod: F7

## 2024-05-09 PROCEDURE — 99204 OFFICE O/P NEW MOD 45 MIN: CPT | Mod: 25

## 2024-05-10 ENCOUNTER — APPOINTMENT (OUTPATIENT)
Dept: HEMATOLOGY ONCOLOGY | Facility: CLINIC | Age: 74
End: 2024-05-10
Payer: MEDICARE

## 2024-05-10 ENCOUNTER — RESULT REVIEW (OUTPATIENT)
Age: 74
End: 2024-05-10

## 2024-05-10 VITALS
TEMPERATURE: 97.3 F | RESPIRATION RATE: 16 BRPM | OXYGEN SATURATION: 99 % | BODY MASS INDEX: 30.82 KG/M2 | HEART RATE: 100 BPM | SYSTOLIC BLOOD PRESSURE: 124 MMHG | DIASTOLIC BLOOD PRESSURE: 87 MMHG | WEIGHT: 185.19 LBS

## 2024-05-10 LAB
BASOPHILS # BLD AUTO: 0.01 K/UL — SIGNIFICANT CHANGE UP (ref 0–0.2)
BASOPHILS NFR BLD AUTO: 0.1 % — SIGNIFICANT CHANGE UP (ref 0–2)
EOSINOPHIL # BLD AUTO: 0.04 K/UL — SIGNIFICANT CHANGE UP (ref 0–0.5)
EOSINOPHIL NFR BLD AUTO: 0.4 % — SIGNIFICANT CHANGE UP (ref 0–6)
HCT VFR BLD CALC: 39.8 % — SIGNIFICANT CHANGE UP (ref 39–50)
HGB BLD-MCNC: 11.9 G/DL — LOW (ref 13–17)
IMM GRANULOCYTES NFR BLD AUTO: 0.5 % — SIGNIFICANT CHANGE UP (ref 0–0.9)
LYMPHOCYTES # BLD AUTO: 1.96 K/UL — SIGNIFICANT CHANGE UP (ref 1–3.3)
LYMPHOCYTES # BLD AUTO: 21.5 % — SIGNIFICANT CHANGE UP (ref 13–44)
MCHC RBC-ENTMCNC: 28.2 PG — SIGNIFICANT CHANGE UP (ref 27–34)
MCHC RBC-ENTMCNC: 29.9 G/DL — LOW (ref 32–36)
MCV RBC AUTO: 94.3 FL — SIGNIFICANT CHANGE UP (ref 80–100)
MONOCYTES # BLD AUTO: 0.96 K/UL — HIGH (ref 0–0.9)
MONOCYTES NFR BLD AUTO: 10.5 % — SIGNIFICANT CHANGE UP (ref 2–14)
NEUTROPHILS # BLD AUTO: 6.08 K/UL — SIGNIFICANT CHANGE UP (ref 1.8–7.4)
NEUTROPHILS NFR BLD AUTO: 67 % — SIGNIFICANT CHANGE UP (ref 43–77)
NRBC # BLD: 0 /100 WBCS — SIGNIFICANT CHANGE UP (ref 0–0)
PLATELET # BLD AUTO: 181 K/UL — SIGNIFICANT CHANGE UP (ref 150–400)
RBC # BLD: 4.22 M/UL — SIGNIFICANT CHANGE UP (ref 4.2–5.8)
RBC # FLD: 17.5 % — HIGH (ref 10.3–14.5)
WBC # BLD: 9.1 K/UL — SIGNIFICANT CHANGE UP (ref 3.8–10.5)
WBC # FLD AUTO: 9.1 K/UL — SIGNIFICANT CHANGE UP (ref 3.8–10.5)

## 2024-05-10 PROCEDURE — 99213 OFFICE O/P EST LOW 20 MIN: CPT

## 2024-05-14 NOTE — HISTORY OF PRESENT ILLNESS
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 Continues to feel stronger and is more active.   Denies fatigue, fevers, chills, night sweats, palpitations, pain, bleeding, edema, LOBATO. Is being followed by cardiac transplant team.  Had biopsy of R 3rd finger about one week ago.  Has full time aide now. No longer using walker.  Had stopped Colchicine for gout but swelling increased, is now back on it as per rheum.  Eyesight is more blurry, will see opthalmoglogist.

## 2024-05-14 NOTE — ASSESSMENT
[Palliative Care Plan] : not applicable at this time [FreeTextEntry1] : Assessment     74 YO M S/P cardiac transplant developed mixed type autoimmune hemolytic anemia while on immunosuppression. Was in remission with well compensated hemolytic anemia on low dose prednisone, but relapsed after tapered to 3 mg daily. 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  Hgb is 11.9 today, will continue Prednisone 15 mg daily.  Has gout flare of R 3rd finger, is taking Colchicine as per rheum.  Colchicine can lower Hgb, will monitor.  Hgb is stable for now.   Had biopsy of R 3rd finger one week ago.    Plan: Prednisone 15 mg daily Monitor counts while on Colchicine.  PCP prophylaxis as per Cardiology- is taking Bactrim CMP, LDH Tacrolimus/ ASA per Cardiology. Folic acid To ER prn RTC in one month.

## 2024-05-14 NOTE — PHYSICAL EXAM
[Restricted in physically strenuous activity but ambulatory and able to carry out work of a light or sedentary nature] : Status 1- Restricted in physically strenuous activity but ambulatory and able to carry out work of a light or sedentary nature, e.g., light house work, office work [Normal] : affect appropriate [de-identified] : RRR. S1S2 normal. Gr 2/6 holosystolic and holodiastolic murmur LLSB. No gallops. [de-identified] : R 3rd finger swollen with tophi noted

## 2024-05-16 NOTE — DISCHARGE NOTE ADULT - NS AS ACTIVITY OBS
S/p CABG in 2015 and s/p PCI x2 to LM in 10/2019. Remains asymptomatic without angina. Currently on Plavix post watchman device procedure on 04/30/2024 with Dr. Swartz, simvastatin 10 mg nightly, and Toprol XL 50 mg nightly. No additional testing at this time.    Walking-Indoors allowed/Walking-Outdoors allowed/Showering allowed/Driving allowed/Stairs allowed

## 2024-05-17 PROBLEM — M10.9 GOUT, ARTHRITIS: Status: ACTIVE | Noted: 2017-12-08

## 2024-05-17 PROBLEM — Z94.1 HEART TRANSPLANT RECIPIENT: Status: ACTIVE | Noted: 2018-04-11

## 2024-05-17 PROBLEM — D84.9 IMMUNOSUPPRESSED STATUS: Status: ACTIVE | Noted: 2019-09-19

## 2024-05-17 PROBLEM — I10 HYPERTENSION: Status: ACTIVE | Noted: 2021-03-09

## 2024-05-17 PROBLEM — D69.41 EVAN'S SYNDROME: Status: ACTIVE | Noted: 2022-09-09

## 2024-05-17 NOTE — HISTORY OF PRESENT ILLNESS
[FreeTextEntry1] : Mr. Baez is a 74 year old man (poor historian( with past medical history of a nonischemic cardiomyopathy and chronic systolic heart failure s/p HM2 LVAD in June 2017 complicated by recurrent GI hemorrhage and possible pump thrombosis who underwent heart transplant on 2/23/18 with a hepatitis C positive donor. His course was complicated by bilateral IJ/subclavian thrombi, a persistent small right sided pleural effusion, as well as acute on chronic renal failure of unclear etiology. In addition, his post-operative course was complicated by graft dysfunction of unclear etiology and persistent C4d positive staining on endomyocardial biopsy with negative DSAs. He developed joshua evidence of graft dysfunction leading to treatment with plasmapheresis, IVIG, and rituximab. Subsequently in June of 2018 he was found to have an pneumonia, that was ultimately diagnosed as Nocardia. This was successfully treated with therapy completed in August 2019.  In the spring 2020, he was admitted with hemolytic anemia of unclear etiology. There were no clear precipitating factors, such as infection. He was treated with prednisone and Rituximab. Given the potential for CNI inhibitors leading to hemolytic anemia, we transitioned him to everolimus and he remained on high dose prednisone. He was subsequently admitted with acute anemia (not hemolytic). He developed severe leukopenia and Klebsiella bacteremia. Following treatment of this, he developed a severe C. diff colitis infection leading to a prolonged recovery. He was weaned to lower dose prednisone and the everolimus was transitioned back to tacrolimus. At the time of discharge he was found to have low levels of CMV viremia and was started back on valganciclovir.   4th annual R/LHC and biopsy (2/28/2022) showed normal hemodynamics EMBX ISHLT Grade 0R, IF C4D 50% +1 staining, and larger LAD.  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 Discharged home and had been following closely with heme/onc.  Pt readmitted 3/3-3/18/23 who presented to [facility] ER on with new onset of chest tightness, found to be in Aflutter/fib. Hospital course c/b by URI w/ human metapneumovirus, normal LV function, acute on chronic kidney injury likely 2/2 hypovolemia, worsening leukocytosis with CT chest c/f PNA and hyperglycemia. Heme/onc consulted for hemolytic anemia management and prednisone dosing reduced to 60mg daily. Endocrine consulted for new onset of hyperglycemia and insulin teaching.  Bacterial infection-work up negative to date however had an elevated Fungitell.  Treated with broad spectrum abx from 3/3 to 3/8 (zosyn then cefepime).  Pt was cleared for discharge home on 3/18.  Pt was re-admitted on who p/w on 6/14/23 recurrent hypoglycemia at home 2/2 poor PO intake and NORMAN SCr 3.1. Endocrine adjusted insulin regimen, pt was hydrated and SCr returned to baseline . Pt was discharged to rehab center on 6/23/23. Discharge weight 177lbs.  Pt presented to clinic on 7/10 from [facility] and was on IV meropenem for unclear reason, thus pt was referred for admission to rule out infection. During hospitalization, due to leukocytosis, he was pancultured, CT chest/abd/pelvis completed, no signs or source of infection was found, but completed 7 day course of meropenem (end date 7/17). Pt was discharged home on 7/18/23.  Pt readmitted 1/25/24 with episode of light-headedness and syncope and found to be hypotensive with NORMAN 2/2 hypovolemia. He improved after IV hydration, bumex decreased from 0.5 mg alternating 1 mg every other day to 0.5mg daily and returned to baseline renal function Scr 1.75. Discharged home on 1/29/24.  Pt arrives today for follow up clinic appt 6 years 3 months post heart transplant. He arrives with his aid (has services 7am -3pm 7 days/week).  Pt reports feeling good and continues to takesdaily walks with his cane. Aide is tracking VS and FS, weight stable ~180 lbs, Checks AM FS 's mg/dL mostly, -130's/80's.  In the intermin since last seen, he reports he was seen by podiatry for nail care and left 5th toe callous treatment. Pt reports since taking colchicine his right hand 3rd digit swelling is improved, no other gout flares. He has an ortho consult this week to determine if any other procedures are indicated.  Appetite good and sleeping fine.  Follows closely with hematology.  Pt denied SOB, CP, palpitations, N/V/D, fever or chills  Health maintenance: DEXA 5/2019, 6/2021: osteopenia, 6/2022: worsening osteopenia. Followed by Dr. Clemens. CT Colon (4/2017) IMPRESSION: No colonic polyp or mass. PSA 6.76 (4/29/2022) HgA1c 4.7% (2/17/23) --> 7.3 % (5/18/23)

## 2024-05-17 NOTE — CARDIOLOGY SUMMARY
[de-identified] : EKG 1/28/24:  bpm +RBBB unchanged  EKG 4/3/23: NSR 91bpm +RBBB EKG 12/28/22: NSR 95 bpm +RBBB [de-identified] : DSE 1/22/2024: CONCLUSIONS  1. Left ventricular cavity is normal. Left ventricular systolic function is normal. There are no regional wall motion abnormalities seen.  2. No electrocardiographic evidence of ischemia at or near maximal predicted heart rate.    DSE 12/22/2020: Conclusions: 1. Normal hemodynamic response. 2. Normal electrocardiographic response. 3. Overall preserved left ventricular systolic function with mild hypokinesis of the mid to distal anterior wall at baseline. Normal augmentation in left ventricular systolic function with dobutamine infusion. 4. No evidence of inducible ischemia on stress echocardiogram images. *** Compared with echocardiogram of 12/2/2020, overall preserved left ventricular systolic function at baseline with mild hypokiesis of the mid to distal anterior wall. Normal augmentation in left ventricular systolic function with dobutamine infusion.  2/20/20: DSE EF 50-55% no evidence of inducible ischemia on pharmacologic stress echo images [de-identified] : TTE: 7/12/23- LVIDd 4.3, LVEF 55%, septal motion abnormal, grossly mild-mod enlarged, no reported valvulopathy TTE: 6/16/23: CONCLUSIONS:  1. Normal left ventricular cavity size. The left ventricular wall thickness is normal. The left ventricular systolic function is normal with an ejection fraction of 56 % by Sherman's method of disks. There are no regional wall motion abnormalities seen.  2. Mildly enlarged right ventricular cavity size, normal wall thickness and probably normal systolic function. The tricuspid annular plane systolic excursion (TAPSE) is 1.0 cm (normal >=1.7 cm).  3. Mild mitral regurgitation.  4. Compared to the transthoracic echocardiogram performed on 5/19/2023 there have been no significant interval changes. TTE 3/3/23: LVIDd 3.6, EF 50-55%, concentric remodeling  TTE 7/25/22: EF 60%, LVIDD 4.5cm, normal LV function, normal RV size with decreased RV function, mild TR, no pericardial effusion  TTE 2/28/22: EF 65% LVIDD not calculated. normal biV function. 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 minimal TR. no pericardial effusion. [de-identified] : \par  CT chest/abd/pelvis 3/14/23: IMPRESSION:\par  Chest: Improving aeration of previously impacted right lower lobe distal  airways with improving bibasilar atelectasis. Additionally, there is a \par  small clustered area of unchanged nodular opacities which may represent \par  superimposed infection or inflammation. 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  No additional coronary-cameral fistulae noted. [de-identified] : C/RHC 2/28/22: \par  Diagnostic Conclusions: \par  mLAD to RV fistula with LM-pLAD dilatation. IVUS performed of the RCA\par  which was normal. Normal RHC\par  \par  12/2/2020: RHC/Biopsy ISHLT Grade 0R\par  11/18/2020:  L/RHC w/ IVUS:\par  CORONARY VESSELS: The coronary circulation is right dominant.\par  LM:   --  LM: The vessel was very large sized. Angiography showed\par  aneurysmal dilatation.\par  LAD:   --  Proximal LAD: The vessel was very large sized. Angiography\par  showed aneurysmal dilatation.\par  --  Mid LAD: The vessel was very large sized and excessively ectatic. A\par  fistula to the left ventricle was identified.\par  --  Distal LAD: Normal. 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 No significant intimal thickening/hyperplasia or\par  plaque was observed. [de-identified] : b/l LE duplex: 3/15/23:  IMPRESSION: There is acute below the knee DVT affecting the right soleal and the left soleal and gastrocnemius veins.  DEXA: 8/15/2023: osteopenia

## 2024-05-17 NOTE — DISCUSSION/SUMMARY
[FreeTextEntry1] : 6+ yrs post transplant Issues: LAD/RV fistula with coronary dilation not amenable to intervention. Initial LV dysfunction initiated on GDMT, normalized. (last EF 55, RVD) hemolytic anemia/recent thrombocytopenia. Admission in feb 2023 for recurrent hem anemia. d/c on pred 75mg. Follow by Dr Rosario. Current dose of pred 15. Last seen by Dr Rosario last week. No plans for ritoxan as per his note.  on prolia for osteoporosis followed by Dr Clemens  intolerant of cellcept due leukopenia. has had serious infections in the past including kleb sepsis and severe cdiff and CMV viremia. everolimus d/c for that reason. Annual Feb 2023: no obstructive CAD. 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 Last Bx Feb 2022 OR. C4D 50%. +1 staining. No histopath features of AMR.  Last DSAs may 2-023 O%. Last heart care Jan 2024. allosure normal.  Admitted: March 3-18. viral pneumonia. metapneumovirus. Afib flutter. self converted. Admission May 23-6.2 Recurrent falls and volume overload. Diuresis. Mild LV dysfunction. No biopsy due to stable allosure. Admission June 14-23: Found at home semi-concious. Hypoglycemia. Insulin adjusted. d/c nursing home pending resolution of adequate home supports. Admitted July 10-17 from my clinic: he had come from nursing home from which we had no information. He was being treated meropenem. Pancutlured, Panscanned. Completed dayne. no source. WBC elevated on admission and d/c Admitted: Jan 2024: lightheadedness syncope, hypotension, juan f Cr 2.5 (baseline 1.3). Imp hypovolemic. d/c on lower dose of bumex  recent  stress Jan 2024: LV size and function. No RWMA. reached MPHR. RV nornal size and function. mild TR.   Plan: c/w prograf current dose, goal trough 4-6 c/w prednisone per heme and ppx meds BP well controlled now, c/w losartan 25 mg daily and toprol XL 200mg daily For hyperkalemia c/w veltessa bid Continue f/u with rheum and ortho for gout  f/u allosure and DSAs today F/u Dr alvarez and TTE in 3 months Findings, assessment and plan reviewed with Dr Alvarez 35mins spent with patient

## 2024-05-17 NOTE — PHYSICAL EXAM
[Well Developed] : well developed [Well Nourished] : well nourished [Normal] : normal S1, S2, no murmur, no rub, no gallop [Normal S1, S2] : normal S1, S2 [Soft] : abdomen soft [Non Tender] : non-tender [de-identified] : no jvd [de-identified] : +III/VI diastolic murmur [de-identified] : 1+ pitting b/l ankle edema

## 2024-05-20 NOTE — PROGRESS NOTE ADULT - SUBJECTIVE AND OBJECTIVE BOX
HPI:  67  y.o M HTN, PAF s/p ablation , NICM  s/p ICD and LVAD p/w GIB s/p EGD and clipping. Hg Improved from 8.4 to 9.3    Review Of Systems:  [x ] 10 point review of systems is otherwise negative except as mentioned above      Medications:  docusate sodium 100 milliGRAM(s) Oral three times a day  amiodarone    Tablet 200 milliGRAM(s) Oral daily  colchicine 0.6 milliGRAM(s) Oral daily  QUEtiapine 50 milliGRAM(s) Oral at bedtime  carvedilol 3.125 milliGRAM(s) Oral every 12 hours  furosemide    Tablet 20 milliGRAM(s) Oral two times a day  pantoprazole    Tablet 40 milliGRAM(s) Oral before breakfast    PMH/PSH/FH/SH: [ ] Unchanged  Vitals:  T(C): 36.2 (17 @ 12:00), Max: 37 (17 @ 08:00)  HR: 97 (17 @ 12:00) (90 - 99)  BP: --  BP(mean): 82 (17 @ 12:00) (76 - 84)  RR: 18 (17 @ 12:00) (18 - 19)  SpO2: 98% (17 @ 12:00) (98% - 99%)  Wt(kg): --  Daily     Daily Weight in k.2 (2017 05:00)  I&O's Summary    :  -  2017 07:00  --------------------------------------------------------  IN: 1180 mL / OUT: 1600 mL / NET: -420 mL    :  -  2017 13:18  --------------------------------------------------------  IN: 320 mL / OUT: 200 mL / NET: 120 mL    Physical Exam:  Appearance: [ x] Normal [ x] NAD  Eyes: [ x] PERRL [x ] EOMI  HEENT: [x ] Normal oral muscosa [x ]NC/AT  Cardiovascular: Typical LVAD sounds. No edema  Respiratory: [x ] Clear to auscultation bilaterally  Gastrointestinal: [x ] Soft [x ] Non-tender [x ] Non-distended [x ] BS+  Musculoskeletal: [x ] No clubbing [x ] No joint deformity   Neurologic: [x ] Non-focal  Lymphatic: [x ] No lymphadenopathy  Psychiatry: [x ] AAOx3 [x ] Mood & affect appropriate  Skin: [x ] No rashes [x ] No ecchymoses [x ] No cyanosis        137  |  104  |  17  ----------------------------<  96  4.3   |  21<L>  |  1.07    Ca    8.7      2017 03:44    TPro  6.8  /  Alb  3.1<L>  /  TBili  1.6<H>  /  DBili  x   /  AST  41<H>  /  ALT  31  /  AlkPhos  140<H>      PT/INR - ( 2017 03:44 )   PT: 15.9 sec;   INR: 1.46 ratio         PTT - ( 2017 01:15 )  PTT:32.8 sec    Interpretation of Telemetry: Sinus Rhythm 1st degree AVB  BPM HPI:  67  y.o M HTN, PAF s/p ablation , NICM  s/p ICD and LVAD p/w GIB s/p EGD and clipping. Hg Improved from 8.4 to 9.3    Review Of Systems:  [x ] 10 point review of systems is otherwise negative except as mentioned above      Medications:  docusate sodium 100 milliGRAM(s) Oral three times a day  amiodarone    Tablet 200 milliGRAM(s) Oral daily  colchicine 0.6 milliGRAM(s) Oral daily  QUEtiapine 50 milliGRAM(s) Oral at bedtime  carvedilol 3.125 milliGRAM(s) Oral every 12 hours  furosemide    Tablet 20 milliGRAM(s) Oral two times a day  pantoprazole    Tablet 40 milliGRAM(s) Oral before breakfast    PMH/PSH/FH/SH: [ ] Unchanged  Vitals:  T(C): 36.2 (17 @ 12:00), Max: 37 (17 @ 08:00)  HR: 97 (17 @ 12:00) (90 - 99)  BP: --  BP(mean): 82 (17 @ 12:00) (76 - 84)  RR: 18 (17 @ 12:00) (18 - 19)  SpO2: 98% (17 @ 12:00) (98% - 99%)  Wt(kg): --  Daily     Daily Weight in k.2 (2017 05:00)  I&O's Summary    :  -  2017 07:00  --------------------------------------------------------  IN: 1180 mL / OUT: 1600 mL / NET: -420 mL    :  -  2017 13:18  --------------------------------------------------------  IN: 320 mL / OUT: 200 mL / NET: 120 mL    Physical Exam:  Appearance: [ x] Normal [ x] NAD  Eyes: [ x] PERRL [x ] EOMI  HEENT: [x ] Normal oral muscosa [x ]NC/AT  Cardiovascular: Typical LVAD sounds. No edema  Respiratory: [x ] Clear to auscultation bilaterally  Gastrointestinal: [x ] Soft [x ] Non-tender [x ] Non-distended [x ] BS+  Musculoskeletal: [x ] No clubbing [x ] No joint deformity   Neurologic: [x ] Non-focal  Lymphatic: [x ] No lymphadenopathy  Psychiatry: [x ] AAOx3 [x ] Mood & affect appropriate  Skin: [x ] No rashes [x ] No ecchymoses [x ] No cyanosis        137  |  104  |  17  ----------------------------<  96  4.3   |  21<L>  |  1.07    Ca    8.7      2017 03:44    TPro  6.8  /  Alb  3.1<L>  /  TBili  1.6<H>  /  DBili  x   /  AST  41<H>  /  ALT  31  /  AlkPhos  140<H>      PT/INR - ( 2017 03:44 )   PT: 15.9 sec;   INR: 1.46 ratio         PTT - ( 2017 01:15 )  PTT:32.8 sec    Interpretation of Telemetry: Sinus Rhythm 1st degree AVB  BPM    LVAD Interrogation:  Pump Flow: 5.1  Pump Speed: 9200  Pulse Index: 6.4  Pump Power: 5.5:    Programming Changes: No changes made sudden onset

## 2024-05-21 NOTE — PROGRESS NOTE ADULT - PROBLEM SELECTOR PLAN 7
[facility]  Colorectal Surgery  History & Physical    Patient Name: Osman Li Jr.  MRN: [id number]  Admission Date: 5/21/2024  Attending Physician: Farhan Lance MD   Primary Care Provider: Natalee Wagner NP    Subjective:     Chief Complaint/Reason for Admission: Closed loop obstruction    History of Present Illness:  Osman Li Jr. is a 60 y.o. male with a history of HTN, HLD, and DM, Stage IV transverse colon adenocarcinoma who was recently discharged from the hospital on 5/18/24. He is s/p splenic flexure resection, end colostomy, and splenectomy 4/25/22 who presented for colostomy takedown, AMELIA, side-side transverse to descending colocolonic anastomosis, and flex sig on 5/13/24. Post operatively he developed an ileus which improved with conservative management. He was able to be discharged on POD#5. At that time he was tolerating a regular diet, ambulating, voiding spontaneously, having BMs, and had adequate pain control.     Unfortunately he represented to [facility] early on 5/21/24 with worsening left lower quadrant pain and nonbilious, nonbloody vomiting. Upon arrival there he was febrile to 103 and tachycardic, but HDS on RA. Labs with an elevated WBC to 22.8 and slight anemia but otherwise unremarkable. CT A/P was concerning for an internal hernia with closed loop obstruction and so he was transferred to [facility] for higher level of care.    PTA Medications   Medication Sig    atorvastatin (LIPITOR) 20 MG tablet Take 1 tablet (20 mg total) by mouth once daily. (Patient taking differently: Take 20 mg by mouth every evening.)    enalapriL-hydrochlorothiazide (VASERETIC) 10-25 mg per tablet Take 1 tablet by mouth once daily. (Patient taking differently: Take 1 tablet by mouth every morning.)    insulin glargine U-300 conc (TOUJEO MAX U-300 SOLOSTAR) 300 unit/mL (3 mL) insulin pen Inject 26 Units into the skin once daily.    metFORMIN (GLUCOPHAGE) 1000 MG tablet Take  "1 tablet (1,000 mg total) by mouth 2 (two) times daily with meals.    morphine (MS CONTIN) 30 MG 12 hr tablet Take 1 tablet (30 mg total) by mouth 2 (two) times daily. (Patient taking differently: Take 30 mg by mouth 2 (two) times daily. PATIENT TAKING 15MG 5/9/2024)    oxyCODONE (ROXICODONE) 5 MG immediate release tablet Take 1 tablet (5 mg total) by mouth every 4 (four) hours as needed.    pen needle, diabetic 31 gauge x 3/16" Ndle 1 each by Misc.(Non-Drug; Combo Route) route once daily.    promethazine (PHENERGAN) 12.5 MG Tab Take 1 tablet (12.5 mg total) by mouth every 4 (four) hours as needed. (Patient taking differently: Take 12.5 mg by mouth every 4 (four) hours as needed (as needed).)    sildenafiL (VIAGRA) 100 MG tablet Take 1 tablet (100 mg total) by mouth daily as needed for Erectile Dysfunction. (Patient taking differently: Take 100 mg by mouth daily as needed for Erectile Dysfunction (as needed).)    traZODone (DESYREL) 50 MG tablet Take 1 tablet (50 mg total) by mouth every evening.       Review of patient's allergies indicates:   Allergen Reactions    Penicillins Rash       Past Medical History:   Diagnosis Date    Colon cancer     Digestive disorder     DM (diabetes mellitus)     Hyperlipidemia     Hypertension     Prediabetes      Past Surgical History:   Procedure Laterality Date    ADENOIDECTOMY  1972    CHOLECYSTECTOMY  2011    CLOSURE, COLOSTOMY N/A 5/13/2024    Procedure: CLOSURE, COLOSTOMY (eras high/lithotomy);  Surgeon: Farhan Lance MD;  Location: [facility] OR 2ND FLR;  Service: Colon and Rectal;  Laterality: N/A;  CONSENTS IN [facility]    COLON SURGERY      COLONOSCOPY      2018    COLONOSCOPY N/A 3/5/2024    Procedure: COLONOSCOPY;  Surgeon: Farhan Lance MD;  Location: [facility] (4TH FLR);  Service: Endoscopy;  Laterality: N/A;  2/27/24-Casi pt, 1-4wks, port, PEG plus 2 enemas morning of procedure, instr portal-DS  2/28/24- LVM for precall - ERW  pt confirmed procedure. cf    " Holding Losartan due to NORMAN  BP controlled COLOSTOMY N/A 04/25/2022    Procedure: CREATION, COLOSTOMY;  Surgeon: Carlton Waddell MD;  Location: [facility] OR;  Service: General;  Laterality: N/A;    ENDOSCOPIC ULTRASOUND OF UPPER GASTROINTESTINAL TRACT N/A 01/06/2023    Procedure: ULTRASOUND, UPPER GI TRACT, ENDOSCOPIC;  Surgeon: Rinku Orozco III, MD;  Location: [facility] ENDO;  Service: Endoscopy;  Laterality: N/A;    FLEXIBLE SIGMOIDOSCOPY N/A 5/13/2024    Procedure: SIGMOIDOSCOPY, FLEXIBLE;  Surgeon: Farhan Lance MD;  Location: [facility] OR 2ND FLR;  Service: Colon and Rectal;  Laterality: N/A;    INSERTION OF TUNNELED CENTRAL VENOUS CATHETER (CVC) WITH SUBCUTANEOUS PORT Right 05/18/2022    Procedure: [id number]-[id number];  Surgeon: Carlton Waddell MD;  Location: [facility] OR;  Service: General;  Laterality: Right;    INSERTION OF URETERAL CATHETER N/A 5/13/2024    Procedure: INSERTION, CATHETER, URETER, BILATERAL;  Surgeon: Travis Edwards MD;  Location: [facility] OR 2ND FLR;  Service: Urology;  Laterality: N/A;    LYSIS OF ADHESIONS N/A 5/13/2024    Procedure: LYSIS, ADHESIONS;  Surgeon: Farhan Lance MD;  Location: [facility] OR 2ND FLR;  Service: Colon and Rectal;  Laterality: N/A;    MOBILIZATION OF SPLENIC FLEXURE N/A 04/25/2022    Procedure: MOBILIZATION, SPLENIC FLEXURE;  Surgeon: Carlton Waddell MD;  Location: [facility] OR;  Service: General;  Laterality: N/A;    SPLENECTOMY N/A 04/25/2022    Procedure: SPLENECTOMY;  Surgeon: Carlton Waddell MD;  Location: [facility] OR;  Service: General;  Laterality: N/A;    SUBTOTAL COLECTOMY N/A 04/25/2022    Procedure: COLECTOMY, PARTIAL;  Surgeon: Carlotn Waddell MD;  Location: [facility] OR;  Service: General;  Laterality: N/A;    TONSILLECTOMY      TUMOR REMOVAL  10/18/2023    on top of the nose    VASECTOMY  1994     Family History       Problem Relation (Age of Onset)    Diabetes Father    Heart disease Father    Rectal cancer Other          Tobacco Use    Smoking status: Every Day     Current packs/day: 1.00     Average  packs/day: 1 pack/day for 40.0 years (40.0 ttl pk-yrs)     Types: Cigarettes     Passive exposure: Current    Smokeless tobacco: Never   Substance and Sexual Activity    Alcohol use: Not Currently    Drug use: Never    Sexual activity: Yes     Partners: Female     Review of Systems   Constitutional:  Positive for fever. Negative for chills, diaphoresis and unexpected weight change.   HENT:  Negative for sinus pressure and sore throat.    Eyes:  Negative for photophobia and visual disturbance.   Respiratory:  Positive for cough. Negative for shortness of breath.    Cardiovascular:  Negative for chest pain, palpitations and leg swelling.   Gastrointestinal:  Positive for abdominal pain, nausea and vomiting. Negative for abdominal distention, blood in stool and diarrhea.   Musculoskeletal:  Negative for arthralgias and myalgias.   Skin:  Positive for wound. Negative for rash.   Neurological:  Negative for syncope and headaches.   Psychiatric/Behavioral:  Negative for confusion and hallucinations.      Objective:     Vital Signs (Most Recent):    Vital Signs (24h Range):  Temp:  [98.7 °F (37.1 °C)-103 °F (39.4 °C)] 98.7 °F (37.1 °C)  Pulse:  [] 104  Resp:  [14-20] 18  SpO2:  [94 %-97 %] 96 %  BP: (137-164)/(66-75) 137/71        There is no height or weight on file to calculate BMI.     Physical Exam  Vitals and nursing note reviewed.   Constitutional:       General: He is not in acute distress.     Appearance: He is well-developed. He is obese. He is not ill-appearing, toxic-appearing or diaphoretic.   HENT:      Nose:      Comments: NGT in place with thin bilious output     Mouth/Throat:      Pharynx: Oropharynx is clear. No oropharyngeal exudate.   Eyes:      General: No scleral icterus.     Extraocular Movements: Extraocular movements intact.   Cardiovascular:      Rate and Rhythm: Normal rate and regular rhythm.   Pulmonary:      Effort: Pulmonary effort is normal. No respiratory distress.   Abdominal:       General: There is no distension.      Palpations: Abdomen is soft.      Tenderness: There is abdominal tenderness (Mostly in the LLQ).   Musculoskeletal:         General: No deformity. Normal range of motion.   Skin:     General: Skin is warm and dry.      Coloration: Skin is not jaundiced.   Neurological:      General: No focal deficit present.      Mental Status: He is alert.      Cranial Nerves: No cranial nerve deficit.   Psychiatric:         Mood and Affect: Mood normal.         Behavior: Behavior normal.            Significant Labs:  CBC:   Recent Labs   Lab 05/21/24  0156   WBC 22.80*   RBC 4.47*   HGB 13.4*   HCT 39.0*   *   MCV 87   MCH 30.0   MCHC 34.4     CMP:   Recent Labs   Lab 05/21/24  0156   *   CALCIUM 8.9   ALBUMIN 3.4*   PROT 6.2      K 3.2*   CO2 26   CL 99   BUN 12   CREATININE 1.1   ALKPHOS 113   ALT 27   AST 20   BILITOT 0.8     CRP:   Recent Labs   Lab 05/18/24  0408   CRP 45.4*     All pertinent lab results within the last 24 hours have been reviewed.     Significant Diagnostics:  I have reviewed all pertinent imaging results/findings within the past 24 hours.     CT A/P 5/21/24  Impression:     Closed loop bowel obstruction left upper quadrant involving the proximal jejunum with [id number] zones of transition best seen on series 5 images 37 through 47.  Suspect internal hernia with bowel loops extending into the splenectomy site of the left upper quadrant through the omentum for the left colon.  Twisting of mesenteric vessels/architectural distortion seen also suggesting internal hernia.  Obstructed small bowel loops in the closed loop obstruction demonstrate mucosal hyperenhancement and wall thickening with mild distension. Small volume pneumoperitoneum with free air seen in the peritoneal cavity as well as in the suspected internal hernia sac.  Recommend surgical consultation.     Postop change prior partial colon resection left upper quadrant with apparent colocolic  anastomosis.     Prior cholecystectomy.  Mild intra and extrahepatic bile duct dilatation.     Hypoenhancement of the pancreatic tail with adjacent fat stranding calcification, similar to prior.  Assessment/Plan:     Oncology  * Malignant neoplasm of transverse colon  Osman Li Jr. is a 60 y.o. male with a history of Stage IV transverse colon adenocarcinoma s/p splenic flexure resection, end colostomy, and splenectomy 4/25/22 followed by colostomy takedown, AMELIA, side-side transverse to descending colocolonic anastomosis, and flex sig on 5/13/24. He now presents to the hospital with concern for internal hernia with closed loop bowel obstruction    - Discussed the risks, benefits, and alternatives to ex lap with possible bowel resection, possible ostomy, possible abthera, flex sig and all indicated procedures including but not limited to bleeding, infection, damage to surrounding structures, and need for further surgery. All questions and concerns were addressed to the patient's satisfaction. Informed consent obtained and placed in the chart. Also obtained consent for blood.  - To OR for Class A Ex-lap  - NPO  - mIVF  - NGT to LIWS  - PRN pain and nausea meds  - DVT ppx        Belem Vernon MD  Colorectal Surgery  [facility]

## 2024-05-29 NOTE — BH CONSULTATION LIAISON ASSESSMENT NOTE - NSBHMSEKNOWHOW_PSY_ALL_CORE
What Type Of Note Output Would You Prefer (Optional)?: Bullet Format
Hpi Title: Evaluation of Skin Lesions
How Severe Are Your Spot(S)?: mild
Have Your Spot(S) Been Treated In The Past?: has not been treated
Current Events

## 2024-06-05 ENCOUNTER — RX RENEWAL (OUTPATIENT)
Age: 74
End: 2024-06-05

## 2024-06-06 DIAGNOSIS — D59.9 ACQUIRED HEMOLYTIC ANEMIA, UNSPECIFIED: ICD-10-CM

## 2024-06-06 RX ORDER — LANCETS 33 GAUGE
EACH MISCELLANEOUS
Qty: 1 | Refills: 5 | Status: ACTIVE | COMMUNITY
Start: 2023-03-10 | End: 1900-01-01

## 2024-06-06 RX ORDER — BLOOD-GLUCOSE METER
KIT MISCELLANEOUS 4 TIMES DAILY
Qty: 1 | Refills: 5 | Status: ACTIVE | COMMUNITY
Start: 2023-03-10 | End: 1900-01-01

## 2024-06-07 ENCOUNTER — APPOINTMENT (OUTPATIENT)
Dept: HEMATOLOGY ONCOLOGY | Facility: CLINIC | Age: 74
End: 2024-06-07

## 2024-06-10 ENCOUNTER — RX RENEWAL (OUTPATIENT)
Age: 74
End: 2024-06-10

## 2024-06-10 RX ORDER — VALGANCICLOVIR HYDROCHLORIDE 450 MG/1
450 TABLET ORAL
Qty: 8 | Refills: 5 | Status: ACTIVE | COMMUNITY
Start: 2023-01-27 | End: 1900-01-01

## 2024-06-10 RX ORDER — SULFAMETHOXAZOLE AND TRIMETHOPRIM 400; 80 MG/1; MG/1
400-80 TABLET ORAL
Qty: 28 | Refills: 4 | Status: ACTIVE | COMMUNITY
Start: 2023-01-27 | End: 1900-01-01

## 2024-06-10 RX ORDER — MULTIVIT-MIN/FOLIC/VIT K/LYCOP 400-300MCG
25 MCG TABLET ORAL DAILY
Qty: 28 | Refills: 5 | Status: ACTIVE | COMMUNITY
Start: 2022-05-26 | End: 1900-01-01

## 2024-06-11 RX ORDER — PREDNISONE 10 MG/1
10 TABLET ORAL
Qty: 90 | Refills: 5 | Status: ACTIVE | COMMUNITY
Start: 2023-12-08 | End: 1900-01-01

## 2024-06-11 RX ORDER — PREDNISONE 5 MG/1
5 TABLET ORAL
Qty: 28 | Refills: 5 | Status: ACTIVE | COMMUNITY
Start: 2023-12-08 | End: 1900-01-01

## 2024-06-17 RX ORDER — NYSTATIN 100000 [USP'U]/ML
100000 SUSPENSION ORAL 4 TIMES DAILY
Qty: 1 | Refills: 5 | Status: ACTIVE | COMMUNITY
Start: 2023-03-10 | End: 1900-01-01

## 2024-06-19 NOTE — DISCUSSION/SUMMARY
[FreeTextEntry1] : - Immunosuppression:\par  His Prograf level this week is 6.2 will increase to 4mg bid. Goal dose Prograf  level 8-10. He remains off of prednisone and Cellcept. \par \par - S/P Heart transplantation: His next RHC/BX is in january. \par \par - Hyperkalemia with CKD: He continues on increased dose of Veltessa. 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 Hutchins the use of fludrocortisone. He is sodium bicarbonate 650 mg PO BID. \par K today 5.2 SCR 1.6\par He will see Dr Onofre on 11/30\par \par - Antibiotic prophylaxis:CMV (intermediate risk): We resumed valgancyclovir given low levels of CMV on PCR. Last level was undetectable. We will discuss Valcyte dosing with ID. \par PCP/Toxo (intermediate risk): He is currently on Bactrim. \par \par -Nocardia pneumonia: Continue current oral Bactrim per ID. \par \par - C. diff colitis: He continues on oral vancomycin, which will be completed this week\par \par - CAV prophylaxis/hyperlipidemia: \par Continue ASA 81mg daily and pravachol 20 mg daily\par \par - Chronic hepatitis C infection: \par Currently treated with undetectable viral load. He will need routine monitoring per protocol. \par \par - Stress ulcer prophylaxis: Continue famotidine 20mg daily.\par \par - Osteoporosis prophylaxis: Continue calcium + vit D 2 tabs BID.\par \par - Hypomagnesemia: Continue magnesium oxide 1200 mg daily.\par - Weekly labs to monitor K and drug levels, more frequent as needed\par  No

## 2024-06-24 NOTE — CHART NOTE - NSCHARTNOTEFT_GEN_A_CORE
9/6/18 2022  As per Dr Parker and Dr Hurtado ( Heart Transplant team) no thoracic surgery intervention warranted at this time.  Interventional radiology to perform FNA  of RUL in am.  D/w Dr Woo IR.  Patient NPO preparation of procedure   Dana Shine CTS NP [id number] refill

## 2024-07-02 ENCOUNTER — APPOINTMENT (OUTPATIENT)
Dept: RHEUMATOLOGY | Facility: CLINIC | Age: 74
End: 2024-07-02
Payer: MEDICARE

## 2024-07-02 VITALS
RESPIRATION RATE: 16 BRPM | TEMPERATURE: 97.1 F | OXYGEN SATURATION: 98 % | WEIGHT: 185 LBS | BODY MASS INDEX: 30.82 KG/M2 | HEIGHT: 65 IN | HEART RATE: 86 BPM | DIASTOLIC BLOOD PRESSURE: 84 MMHG | SYSTOLIC BLOOD PRESSURE: 130 MMHG

## 2024-07-02 DIAGNOSIS — D69.3 IMMUNE THROMBOCYTOPENIC PURPURA: ICD-10-CM

## 2024-07-02 DIAGNOSIS — Z46.89 ENCOUNTER FOR FITTING AND ADJUSTMENT OF OTHER SPECIFIED DEVICES: ICD-10-CM

## 2024-07-02 DIAGNOSIS — M1A.9XX1 CHRONIC GOUT, UNSPECIFIED, WITH TOPHUS (TOPHI): ICD-10-CM

## 2024-07-02 PROCEDURE — G2211 COMPLEX E/M VISIT ADD ON: CPT

## 2024-07-02 PROCEDURE — 99214 OFFICE O/P EST MOD 30 MIN: CPT

## 2024-07-08 ENCOUNTER — RX RENEWAL (OUTPATIENT)
Age: 74
End: 2024-07-08

## 2024-07-30 ENCOUNTER — RX RENEWAL (OUTPATIENT)
Age: 74
End: 2024-07-30

## 2024-07-31 ENCOUNTER — APPOINTMENT (OUTPATIENT)
Dept: INTERNAL MEDICINE | Facility: CLINIC | Age: 74
End: 2024-07-31
Payer: MEDICARE

## 2024-07-31 VITALS
SYSTOLIC BLOOD PRESSURE: 123 MMHG | TEMPERATURE: 97.2 F | BODY MASS INDEX: 28.99 KG/M2 | DIASTOLIC BLOOD PRESSURE: 79 MMHG | HEART RATE: 84 BPM | WEIGHT: 174 LBS | HEIGHT: 65 IN

## 2024-07-31 DIAGNOSIS — Z13.31 ENCOUNTER FOR SCREENING FOR DEPRESSION: ICD-10-CM

## 2024-07-31 DIAGNOSIS — R42 DIZZINESS AND GIDDINESS: ICD-10-CM

## 2024-07-31 PROCEDURE — G2211 COMPLEX E/M VISIT ADD ON: CPT

## 2024-07-31 PROCEDURE — 99213 OFFICE O/P EST LOW 20 MIN: CPT

## 2024-07-31 PROCEDURE — G0444 DEPRESSION SCREEN ANNUAL: CPT | Mod: 59

## 2024-07-31 RX ORDER — MECLIZINE HYDROCHLORIDE 25 MG/1
25 TABLET ORAL 3 TIMES DAILY
Qty: 30 | Refills: 0 | Status: ACTIVE | COMMUNITY
Start: 2024-07-31 | End: 1900-01-01

## 2024-08-02 PROBLEM — R42 DIZZINESS, NONSPECIFIC: Status: ACTIVE | Noted: 2024-07-31

## 2024-08-02 PROBLEM — Z13.31 SCREENING FOR DEPRESSION: Status: ACTIVE | Noted: 2024-08-02

## 2024-08-02 NOTE — PHYSICAL EXAM
[No Acute Distress] : no acute distress [Well-Appearing] : well-appearing [Normal Sclera/Conjunctiva] : normal sclera/conjunctiva [Normal Outer Ear/Nose] : the outer ears and nose were normal in appearance [No Respiratory Distress] : no respiratory distress  [No Accessory Muscle Use] : no accessory muscle use [Clear to Auscultation] : lungs were clear to auscultation bilaterally [Normal Rate] : normal rate  [Regular Rhythm] : with a regular rhythm [Normal S1, S2] : normal S1 and S2 [Normal Affect] : the affect was normal [Alert and Oriented x3] : oriented to person, place, and time [de-identified] : in a wheelchair

## 2024-08-02 NOTE — HISTORY OF PRESENT ILLNESS
[FreeTextEntry8] : 73 yo M pt complains of dizziness while lying down. He bought a new pillow and it helped him. Denies dizziness when standing or sitting. He is accompanied by his aide, Ronald.

## 2024-08-02 NOTE — HEALTH RISK ASSESSMENT
[No] : In the past 12 months have you used drugs other than those required for medical reasons? No [No falls in past year] : Patient reported no falls in the past year [Little interest or pleasure doing things] : 1) Little interest or pleasure doing things [Feeling down, depressed, or hopeless] : 2) Feeling down, depressed, or hopeless [0] : 2) Feeling down, depressed, or hopeless: Not at all (0) [PHQ-2 Negative - No further assessment needed] : PHQ-2 Negative - No further assessment needed [Former] : Former [> 15 Years] : > 15 Years [Time Spent: ___ Minutes] : I spent [unfilled] minutes performing a depression screening for this patient. [de-identified] : no [de-identified] : Rheumatology, Haematology, Orthopedics, Cardiothoracic [de-identified] : pt is fairly active [de-identified] : fair [[id number]] : 0 [de-identified] : pt smoked for 20+ years 2 cigarettes a day

## 2024-08-02 NOTE — HEALTH RISK ASSESSMENT
[No] : In the past 12 months have you used drugs other than those required for medical reasons? No [No falls in past year] : Patient reported no falls in the past year [Little interest or pleasure doing things] : 1) Little interest or pleasure doing things [Feeling down, depressed, or hopeless] : 2) Feeling down, depressed, or hopeless [0] : 2) Feeling down, depressed, or hopeless: Not at all (0) [PHQ-2 Negative - No further assessment needed] : PHQ-2 Negative - No further assessment needed [Former] : Former [> 15 Years] : > 15 Years [Time Spent: ___ Minutes] : I spent [unfilled] minutes performing a depression screening for this patient. [de-identified] : no [de-identified] : Rheumatology, Haematology, Orthopedics, Cardiothoracic [de-identified] : pt is fairly active [de-identified] : fair [[id number]] : 0 [de-identified] : pt smoked for 20+ years 2 cigarettes a day

## 2024-08-02 NOTE — PHYSICAL EXAM
[No Acute Distress] : no acute distress [Well-Appearing] : well-appearing [Normal Sclera/Conjunctiva] : normal sclera/conjunctiva [Normal Outer Ear/Nose] : the outer ears and nose were normal in appearance [No Respiratory Distress] : no respiratory distress  [No Accessory Muscle Use] : no accessory muscle use [Clear to Auscultation] : lungs were clear to auscultation bilaterally [Normal Rate] : normal rate  [Regular Rhythm] : with a regular rhythm [Normal S1, S2] : normal S1 and S2 [Normal Affect] : the affect was normal [Alert and Oriented x3] : oriented to person, place, and time [de-identified] : in a wheelchair

## 2024-08-07 NOTE — DISCHARGE NOTE ADULT - NS MD DC PLAN IMMU FLU PROVIDE INFO
Brief Postoperative Note      Patient: Misty Dumont  YOB: 1940  MRN: [id number]    Date of Procedure: 8/7/2024    Pre-Op Diagnosis Codes:     * Displaced fracture of left femoral neck (HCC) [S72.002A]    Post-Op Diagnosis: Same       Procedure(s):  LEFT HIP HEMIARTHROPLASTY ANTERIOR APPROACH    Surgeon(s):  Singh Wood DO    Assistant:  Surgical Assistant: Jessica Brady; José Miguel Cantu Andrew Wigger, PA-C    Anesthesia: General and regional    Estimated Blood Loss (mL): 300     Complications: None    Specimens:   * No specimens in log *    Implants:  Implant Name Type Inv. Item Serial No.  Lot No. LRB No. Used Action   SHELL BPLR OD46MM ID28MM HIP CO CHROM RINGLOC - [id number]  SHELL BPLR OD46MM ID28MM HIP CO CHROM RINGLOC  [employer]- [id number] Left 1 Implanted   STEM FEM UNIV 0+ 28 MM HIP COCR - [id number]  STEM FEM UNIV 0+ 28 MM HIP COCR  [employer]WD [id number] Left 1 Implanted   Avenir Complete High Offset Collared Size 5 - [id number]  Avenir Complete High Offset Collared Size 5  [employer]IN [employer]- [id number] Left 1 Implanted   Implants: Marin avenir hemiarthroplasty, 5 collared stem, 28+0 head, 46 shell/28 liner      Drains:   External Urinary Catheter (Active)   Site Assessment Clean,dry & intact 08/07/24 1018   Placement Replaced 08/07/24 1018   Securement Method Securing device (Describe) 08/07/24 1018   Catheter Care Catheter/Wick replaced 08/07/24 1018   Perineal Care Yes 08/07/24 1018   Suction 40 mmgHg continuous 08/07/24 1018   Urine Color Tracee 08/06/24 2330   Urine Appearance Clear 08/06/24 2330   Output (mL) 200 mL 08/07/24 0535       Findings:  Infection Present At Time Of Surgery (PATOS) (choose all levels that have infection present):  No infection present  Other Findings: see op note    Electronically signed by Singh Wood DO on 8/7/2024 at 7:18 PM  
No
Risks/benefits discussed with patient or patient surrogate

## 2024-08-13 ENCOUNTER — RESULT REVIEW (OUTPATIENT)
Age: 74
End: 2024-08-13

## 2024-08-13 ENCOUNTER — APPOINTMENT (OUTPATIENT)
Dept: HEART FAILURE | Facility: CLINIC | Age: 74
End: 2024-08-13
Payer: MEDICARE

## 2024-08-13 ENCOUNTER — APPOINTMENT (OUTPATIENT)
Dept: CV DIAGNOSITCS | Facility: HOSPITAL | Age: 74
End: 2024-08-13

## 2024-08-13 VITALS
HEART RATE: 87 BPM | WEIGHT: 173 LBS | SYSTOLIC BLOOD PRESSURE: 134 MMHG | DIASTOLIC BLOOD PRESSURE: 89 MMHG | BODY MASS INDEX: 28.82 KG/M2 | TEMPERATURE: 98 F | OXYGEN SATURATION: 95 % | HEIGHT: 65 IN

## 2024-08-13 PROCEDURE — 99215 OFFICE O/P EST HI 40 MIN: CPT

## 2024-08-13 PROCEDURE — 93000 ELECTROCARDIOGRAM COMPLETE: CPT

## 2024-08-13 NOTE — CARDIOLOGY SUMMARY
[de-identified] : EKG 8/13/24: pending MD read EKG 1/28/24:  bpm +RBBB unchanged  EKG 4/3/23: NSR 91bpm +RBBB EKG 12/28/22: NSR 95 bpm +RBBB [de-identified] : DSE 1/22/2024: CONCLUSIONS  1. Left ventricular cavity is normal. Left ventricular systolic function is normal. There are no regional wall motion abnormalities seen.  2. No electrocardiographic evidence of ischemia at or near maximal predicted heart rate.    DSE 12/22/2020: Conclusions: 1. Normal hemodynamic response. 2. Normal electrocardiographic response. 3. Overall preserved left ventricular systolic function with mild hypokinesis of the mid to distal anterior wall at baseline. Normal augmentation in left ventricular systolic function with dobutamine infusion. 4. No evidence of inducible ischemia on stress echocardiogram images. *** Compared with echocardiogram of 12/2/2020, overall preserved left ventricular systolic function at baseline with mild hypokiesis of the mid to distal anterior wall. Normal augmentation in left ventricular systolic function with dobutamine infusion.  2/20/20: DSE EF 50-55% no evidence of inducible ischemia on pharmacologic stress echo images [de-identified] :  TTE 8/13/24: pending TTE: 7/12/23- LVIDd 4.3, LVEF 55%, septal motion abnormal, grossly mild-mod enlarged, no reported valvulopathy TTE: 6/16/23: CONCLUSIONS:  1. Normal left ventricular cavity size. The left ventricular wall thickness is normal. The left ventricular systolic function is normal with an ejection fraction of 56 % by Sherman's method of disks. There are no regional wall motion abnormalities seen.  2. Mildly enlarged right ventricular cavity size, normal wall thickness and probably normal systolic function. The tricuspid annular plane systolic excursion (TAPSE) is 1.0 cm (normal >=1.7 cm).  3. Mild mitral regurgitation.  4. Compared to the transthoracic echocardiogram performed on 5/19/2023 there have been no significant interval changes. TTE 3/3/23: LVIDd 3.6, EF 50-55%, concentric remodeling  TTE 7/25/22: EF 60%, LVIDD 4.5cm, normal LV function, normal RV size with decreased RV function, mild TR, no pericardial effusion  TTE 2/28/22: EF 65% LVIDD not calculated. normal biV function. 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 minimal TR. no pericardial effusion. [de-identified] : \par  CT chest/abd/pelvis 3/14/23: IMPRESSION:\par  Chest: Improving aeration of previously impacted right lower lobe distal  airways with improving bibasilar atelectasis. Additionally, there is a \par  small clustered area of unchanged nodular opacities which may represent \par  superimposed infection or inflammation. 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  No additional coronary-cameral fistulae noted. [de-identified] : C/RHC 2/28/22: \par  Diagnostic Conclusions: \par  mLAD to RV fistula with LM-pLAD dilatation. IVUS performed of the RCA\par  which was normal. Normal RHC\par  \par  12/2/2020: RHC/Biopsy ISHLT Grade 0R\par  11/18/2020:  L/RHC w/ IVUS:\par  CORONARY VESSELS: The coronary circulation is right dominant.\par  LM:   --  LM: The vessel was very large sized. Angiography showed\par  aneurysmal dilatation.\par  LAD:   --  Proximal LAD: The vessel was very large sized. Angiography\par  showed aneurysmal dilatation.\par  --  Mid LAD: The vessel was very large sized and excessively ectatic. A\par  fistula to the left ventricle was identified.\par  --  Distal LAD: Normal. 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 No significant intimal thickening/hyperplasia or\par  plaque was observed. [de-identified] : b/l LE duplex: 3/15/23:  IMPRESSION: There is acute below the knee DVT affecting the right soleal and the left soleal and gastrocnemius veins.  DEXA: 8/15/2023: osteopenia

## 2024-08-13 NOTE — HISTORY OF PRESENT ILLNESS
[FreeTextEntry1] : Mr. Baez is a 74 year old man (poor historian( with past medical history of a nonischemic cardiomyopathy and chronic systolic heart failure s/p HM2 LVAD in June 2017 complicated by recurrent GI hemorrhage and possible pump thrombosis who underwent heart transplant on 2/23/18 with a hepatitis C positive donor. His course was complicated by bilateral IJ/subclavian thrombi, a persistent small right sided pleural effusion, as well as acute on chronic renal failure of unclear etiology. In addition, his post-operative course was complicated by graft dysfunction of unclear etiology and persistent C4d positive staining on endomyocardial biopsy with negative DSAs. He developed joshua evidence of graft dysfunction leading to treatment with plasmapheresis, IVIG, and rituximab. Subsequently in June of 2018 he was found to have an pneumonia, that was ultimately diagnosed as Nocardia. This was successfully treated with therapy completed in August 2019.  In the spring 2020, he was admitted with hemolytic anemia of unclear etiology. There were no clear precipitating factors, such as infection. He was treated with prednisone and Rituximab. Given the potential for CNI inhibitors leading to hemolytic anemia, we transitioned him to everolimus and he remained on high dose prednisone. He was subsequently admitted with acute anemia (not hemolytic). He developed severe leukopenia and Klebsiella bacteremia. Following treatment of this, he developed a severe C. diff colitis infection leading to a prolonged recovery. He was weaned to lower dose prednisone and the everolimus was transitioned back to tacrolimus. At the time of discharge he was found to have low levels of CMV viremia and was started back on valganciclovir.   4th annual R/LHC and biopsy (2/28/2022) showed normal hemodynamics EMBX ISHLT Grade 0R, IF C4D 50% +1 staining, and larger LAD.  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 Discharged home and had been following closely with heme/onc.  Pt readmitted 3/3-3/18/23 who presented to [facility] ER on with new onset of chest tightness, found to be in Aflutter/fib. Hospital course c/b by URI w/ human metapneumovirus, normal LV function, acute on chronic kidney injury likely 2/2 hypovolemia, worsening leukocytosis with CT chest c/f PNA and hyperglycemia. Heme/onc consulted for hemolytic anemia management and prednisone dosing reduced to 60mg daily. Endocrine consulted for new onset of hyperglycemia and insulin teaching.  Bacterial infection-work up negative to date however had an elevated Fungitell.  Treated with broad spectrum abx from 3/3 to 3/8 (zosyn then cefepime).  Pt was cleared for discharge home on 3/18.  Pt was re-admitted on who p/w on 6/14/23 recurrent hypoglycemia at home 2/2 poor PO intake and NORMAN SCr 3.1. Endocrine adjusted insulin regimen, pt was hydrated and SCr returned to baseline . Pt was discharged to rehab center on 6/23/23. Discharge weight 177lbs.  Pt presented to clinic on 7/10 from [facility] and was on IV meropenem for unclear reason, thus pt was referred for admission to rule out infection. During hospitalization, due to leukocytosis, he was pancultured, CT chest/abd/pelvis completed, no signs or source of infection was found, but completed 7 day course of meropenem (end date 7/17). Pt was discharged home on 7/18/23.  Pt readmitted 1/25/24 with episode of light-headedness and syncope and found to be hypotensive with NORMAN 2/2 hypovolemia. He improved after IV hydration, bumex decreased from 0.5 mg alternating 1 mg every other day to 0.5mg daily and returned to baseline renal function Scr 1.75. Discharged home on 1/29/24.  Pt arrives today for follow up clinic appt 6.5 years post heart transplant. He arrives with his aid (has services 7am -3pm 7 days/week). Since last seen in May, pt reports feeling and continues to takes daily walks with his cane. Aide is tracking VS and FS, has lost 11 lbs in 3 months, Checks AM FS 's mg/dL mostly, -130's/80's.  In the intermin since last seen, he reports he was seen by rheumatology, hematology, ortho and PMD.  Appetite good and sleeping fine.  Follows closely with hematology.  Pt denied SOB, CP, palpitations, N/V/D, fever or chills  Health maintenance: DEXA 5/2019, 6/2021: osteopenia, 6/2022: worsening osteopenia. Followed by Dr. Clemens. CT Colon (4/2017) IMPRESSION: No colonic polyp or mass. PSA 6.76 (4/29/2022) HgA1c 4.7% (2/17/23) --> 7.3 % (5/18/23)

## 2024-08-13 NOTE — PHYSICAL EXAM
[Well Developed] : well developed [Well Nourished] : well nourished [Normal] : normal S1, S2, no murmur, no rub, no gallop [Normal S1, S2] : normal S1, S2 [Soft] : abdomen soft [Non Tender] : non-tender [de-identified] : no jvd [de-identified] : +III/VI diastolic murmur [de-identified] : 1+ pitting b/l ankle edema

## 2024-08-13 NOTE — DISCUSSION/SUMMARY
[FreeTextEntry1] :  6 yrs post transplant Issues: LAD/RV fistula with coronary dilation not amenable to intervention.  Intial LV dyfunction initiated on GDMT, normalized. (last EF 55, RVD) hemolytic anemia/recent thrombocytopenia. Admission in feb 2023 for recurrent hem anemia. d/c on pred 75mg. Follow by Dr Rosario. Current dose of pred 15. Last seen by Dr oRsario in May. No plans for ritoxan as per his note.  on prolea for osteoperosis followed by Dr Clemens  intolerant of cellcept due leukopenia. has had serious infections in the past including kleb sepsis and severe cdiff and CMV viremia. everolimus d/c for that reason. Annual Feb 2024: normal stress test. LVEF 65% Last Bx Feb 2022 OR. C4D 50%. +1 staining.No histopath features of AMR.  Last DSAs may 2024 no DSAs. Last heart care July 2024. allosure normal.  Admitted: March 2023. viral pneumonia. metapneumovirus. Afib flutter. self converted. Admission May 2023 Recurrent falls and volume overload. Diuresis. Mild LV dysfunction. No biopsy due to stable allosure. Admission June 2023: Found at home semi-concious. Hypoglycemia. Insulin adjusted. d/c nursing home pending resolution of adequate home supports. Admitted July 2023 from my clinic: ? infection given dayne in nursing home. no infection found.  Admitted: Jan 2024: lightheadedness syncope, hypotension, juan f Cr 2.5 (baseline 1.3). Imp hypovolemic.  Feeling well.    prograf 2 bid (6.6, goal is 4-6) , off cellcept , pred 15, eloquis 5 bid, asa nystatin, bactrim TIW, valcyte 450 biw, statin, toprol 200 , off bumex QD, insulin losartan 75 valtessa  home BP 130s 134/89 87 173 (dwon 11kbs) no recent labs Stable. Much better support at home. plan; no change in prograf goal. heart care and see NP Q 3mth. DSA Q years.  check TTE from today See me 6mths Marvin Campbell

## 2024-08-14 ENCOUNTER — APPOINTMENT (OUTPATIENT)
Dept: CV DIAGNOSITCS | Facility: HOSPITAL | Age: 74
End: 2024-08-14

## 2024-08-14 ENCOUNTER — APPOINTMENT (OUTPATIENT)
Dept: HEART FAILURE | Facility: CLINIC | Age: 74
End: 2024-08-14

## 2024-08-14 ENCOUNTER — APPOINTMENT (OUTPATIENT)
Dept: OPHTHALMOLOGY | Facility: CLINIC | Age: 74
End: 2024-08-14

## 2024-08-16 ENCOUNTER — LABORATORY RESULT (OUTPATIENT)
Age: 74
End: 2024-08-16

## 2024-08-19 DIAGNOSIS — Z94.1 HEART TRANSPLANT STATUS: ICD-10-CM

## 2024-08-21 ENCOUNTER — APPOINTMENT (OUTPATIENT)
Dept: ENDOCRINOLOGY | Facility: CLINIC | Age: 74
End: 2024-08-21
Payer: MEDICARE

## 2024-08-21 DIAGNOSIS — M81.0 AGE-RELATED OSTEOPOROSIS W/OUT CURRENT PATHOLOGICAL FRACTURE: ICD-10-CM

## 2024-08-21 PROCEDURE — 96372 THER/PROPH/DIAG INJ SC/IM: CPT

## 2024-08-21 RX ORDER — DENOSUMAB 60 MG/ML
60 INJECTION SUBCUTANEOUS
Qty: 1 | Refills: 0 | Status: COMPLETED | OUTPATIENT
Start: 2024-08-20

## 2024-08-27 ENCOUNTER — APPOINTMENT (OUTPATIENT)
Dept: OPHTHALMOLOGY | Facility: CLINIC | Age: 74
End: 2024-08-27
Payer: MEDICARE

## 2024-08-27 ENCOUNTER — NON-APPOINTMENT (OUTPATIENT)
Age: 74
End: 2024-08-27

## 2024-08-27 PROCEDURE — 92004 COMPRE OPH EXAM NEW PT 1/>: CPT

## 2024-08-27 NOTE — PROGRESS NOTE ADULT - PROBLEM/PLAN-9
Post-Op Assessment Note    CV Status:  Stable    Pain management: satisfactory to patient       Mental Status:  Alert and awake   Hydration Status:  Euvolemic   PONV Controlled:  Controlled   Airway Patency:  Patent     Post Op Vitals Reviewed: Yes    No anethesia notable event occurred.    Staff: CRNA               BP 98/50 (08/27/24 0821)    Temp 97.7 °F (36.5 °C) (08/27/24 0821)    Pulse 62 (08/27/24 0821)   Resp 16 (08/27/24 0821)    SpO2 98 % (08/27/24 0821)      
DISPLAY PLAN FREE TEXT

## 2024-08-30 ENCOUNTER — RX RENEWAL (OUTPATIENT)
Age: 74
End: 2024-08-30

## 2024-09-01 NOTE — PROGRESS NOTE ADULT - PROBLEM/PLAN-1
DISPLAY PLAN FREE TEXT
100

## 2024-09-16 NOTE — PROVIDER CONTACT NOTE (OTHER) - REASON
20 beats of wide complex Dermatology Rooming Note    Juana Murillo's goals for this visit include:   Chief Complaint   Patient presents with    Derm Problem     Check left earlob: triple piercing infection. Keeps filling with pus and blood     Leta Johnson LPN

## 2024-09-17 ENCOUNTER — APPOINTMENT (OUTPATIENT)
Dept: OPHTHALMOLOGY | Facility: CLINIC | Age: 74
End: 2024-09-17
Payer: MEDICARE

## 2024-09-17 ENCOUNTER — NON-APPOINTMENT (OUTPATIENT)
Age: 74
End: 2024-09-17

## 2024-09-17 PROCEDURE — 99213 OFFICE O/P EST LOW 20 MIN: CPT

## 2024-09-17 PROCEDURE — 92136 OPHTHALMIC BIOMETRY: CPT

## 2024-09-19 ENCOUNTER — APPOINTMENT (OUTPATIENT)
Dept: INTERNAL MEDICINE | Facility: CLINIC | Age: 74
End: 2024-09-19
Payer: MEDICARE

## 2024-09-19 ENCOUNTER — NON-APPOINTMENT (OUTPATIENT)
Age: 74
End: 2024-09-19

## 2024-09-19 VITALS
TEMPERATURE: 97.3 F | HEART RATE: 89 BPM | WEIGHT: 170 LBS | OXYGEN SATURATION: 97 % | DIASTOLIC BLOOD PRESSURE: 80 MMHG | BODY MASS INDEX: 28.32 KG/M2 | SYSTOLIC BLOOD PRESSURE: 120 MMHG | HEIGHT: 65 IN

## 2024-09-19 DIAGNOSIS — Z01.818 ENCOUNTER FOR OTHER PREPROCEDURAL EXAMINATION: ICD-10-CM

## 2024-09-19 DIAGNOSIS — Z12.2 ENCOUNTER FOR SCREENING FOR MALIGNANT NEOPLASM OF RESPIRATORY ORGANS: ICD-10-CM

## 2024-09-19 PROCEDURE — G2211 COMPLEX E/M VISIT ADD ON: CPT

## 2024-09-19 PROCEDURE — 93000 ELECTROCARDIOGRAM COMPLETE: CPT

## 2024-09-19 PROCEDURE — 99214 OFFICE O/P EST MOD 30 MIN: CPT

## 2024-09-19 NOTE — PHYSICAL EXAM
[No Acute Distress] : no acute distress [Normal Sclera/Conjunctiva] : normal sclera/conjunctiva [Normal Outer Ear/Nose] : the outer ears and nose were normal in appearance [No Respiratory Distress] : no respiratory distress  [No Accessory Muscle Use] : no accessory muscle use [Clear to Auscultation] : lungs were clear to auscultation bilaterally [Normal Rate] : normal rate  [Regular Rhythm] : with a regular rhythm [Normal S1, S2] : normal S1 and S2 [Grossly Normal Strength/Tone] : grossly normal strength/tone [Normal Affect] : the affect was normal [Alert and Oriented x3] : oriented to person, place, and time

## 2024-09-19 NOTE — HISTORY OF PRESENT ILLNESS
[Atrial Fibrillation] : atrial fibrillation [(Patient denies any chest pain, claudication, dyspnea on exertion, orthopnea, palpitations or syncope)] : Patient denies any chest pain, claudication, dyspnea on exertion, orthopnea, palpitations or syncope [Moderate (4-6 METs)] : Moderate (4-6 METs) [Coronary Artery Disease] : coronary artery disease [No Pertinent Pulmonary History] : no history of asthma, COPD, sleep apnea, or smoking [No Adverse Anesthesia Reaction] : no adverse anesthesia reaction in self or family member [Chronic Anticoagulation] : chronic anticoagulation [Chronic Kidney Disease] : chronic kidney disease [Diabetes] : diabetes [FreeTextEntry1] : Right Cataract Extraction [FreeTextEntry2] : 10/02/2024 [FreeTextEntry3] : Dr. Lavelle Donaldsonfiled [FreeTextEntry7] : heart transplant

## 2024-09-19 NOTE — ASSESSMENT
[Patient Optimized for Surgery] : Patient optimized for surgery [No Further Testing Recommended] : no further testing recommended [As per surgery] : as per surgery [FreeTextEntry4] : I have spent 35 minutes reviewing the patient's past medical history, medication list, EKG and current symptoms to determine their eligibility for the procedure in question.

## 2024-10-02 ENCOUNTER — APPOINTMENT (OUTPATIENT)
Dept: OPHTHALMOLOGY | Facility: EYE CENTER | Age: 74
End: 2024-10-02
Payer: MEDICARE

## 2024-10-02 ENCOUNTER — NON-APPOINTMENT (OUTPATIENT)
Age: 74
End: 2024-10-02

## 2024-10-02 DIAGNOSIS — Z94.1 HEART TRANSPLANT STATUS: ICD-10-CM

## 2024-10-02 PROCEDURE — 66984 XCAPSL CTRC RMVL W/O ECP: CPT | Mod: RT

## 2024-10-03 ENCOUNTER — LABORATORY RESULT (OUTPATIENT)
Age: 74
End: 2024-10-03

## 2024-10-03 ENCOUNTER — APPOINTMENT (OUTPATIENT)
Dept: RHEUMATOLOGY | Facility: CLINIC | Age: 74
End: 2024-10-03

## 2024-10-03 ENCOUNTER — NON-APPOINTMENT (OUTPATIENT)
Age: 74
End: 2024-10-03

## 2024-10-03 ENCOUNTER — APPOINTMENT (OUTPATIENT)
Dept: OPHTHALMOLOGY | Facility: CLINIC | Age: 74
End: 2024-10-03
Payer: MEDICARE

## 2024-10-03 PROCEDURE — 99024 POSTOP FOLLOW-UP VISIT: CPT

## 2024-10-04 ENCOUNTER — LABORATORY RESULT (OUTPATIENT)
Age: 74
End: 2024-10-04

## 2024-10-08 ENCOUNTER — NON-APPOINTMENT (OUTPATIENT)
Age: 74
End: 2024-10-08

## 2024-10-08 ENCOUNTER — APPOINTMENT (OUTPATIENT)
Dept: OPHTHALMOLOGY | Facility: CLINIC | Age: 74
End: 2024-10-08
Payer: MEDICARE

## 2024-10-08 PROCEDURE — 99024 POSTOP FOLLOW-UP VISIT: CPT

## 2024-10-11 NOTE — PROGRESS NOTE ADULT - PROBLEM SELECTOR PROBLEM 7
37yo POD#2 s/p rLTCS. Pain is well controlled. She is tolerating a regular diet and passing flatus. She is voiding spontaneously, and ambulating without difficulty. Denies CP/SOB. Denies lightheadedness/dizziness. Denies N/V.    O:  Vitals:  Vital Signs Last 24 Hrs  T(C): 37.1 (10 Oct 2024 22:00), Max: 37.4 (10 Oct 2024 10:21)  T(F): 98.7 (10 Oct 2024 22:00), Max: 99.4 (10 Oct 2024 10:21)  HR: 108 (10 Oct 2024 22:00) (102 - 108)  BP: 132/76 (10 Oct 2024 22:00) (120/61 - 132/76)  BP(mean): --  RR: 18 (10 Oct 2024 22:00) (18 - 18)  SpO2: 100% (10 Oct 2024 22:00) (98% - 100%)    Parameters below as of 10 Oct 2024 22:00  Patient On (Oxygen Delivery Method): room air        MEDICATIONS  (STANDING):  acetaminophen     Tablet .. 975 milliGRAM(s) Oral <User Schedule>  ascorbic acid 500 milliGRAM(s) Oral daily  diphtheria/tetanus/pertussis (acellular) Vaccine (Adacel) 0.5 milliLiter(s) IntraMuscular once  ferrous    sulfate 325 milliGRAM(s) Oral three times a day  heparin   Injectable 51428 Unit(s) SubCutaneous every 12 hours  ibuprofen  Tablet. 600 milliGRAM(s) Oral every 6 hours  influenza   Vaccine 0.5 milliLiter(s) IntraMuscular once  senna 2 Tablet(s) Oral at bedtime  sodium chloride 0.9% lock flush 3 milliLiter(s) IV Push every 8 hours      MEDICATIONS  (PRN):  diphenhydrAMINE 25 milliGRAM(s) Oral every 6 hours PRN Pruritus  lanolin Ointment 1 Application(s) Topical every 6 hours PRN Sore Nipples  magnesium hydroxide Suspension 30 milliLiter(s) Oral two times a day PRN Constipation  oxyCODONE    IR 5 milliGRAM(s) Oral every 3 hours PRN Moderate to Severe Pain (4-10)  oxyCODONE    IR 5 milliGRAM(s) Oral once PRN Moderate to Severe Pain (4-10)  simethicone 80 milliGRAM(s) Chew every 4 hours PRN Gas      Labs:  Blood type: O Positive  Rubella IgG: RPR: Negative                          9.5[L]   10.58[H] >-----------< 164    ( 10-10 @ 06:00 )             28.2[L]                        11.9   12.29[H] >-----------< 203    ( 10-09 @ 13:30 )             36.8                  PE:  General: NAD  Abdomen: Soft, appropriately tender, incision c/d/i.  Extremities: No erythema, no pitting edema   S/P orthotopic heart transplant 37yo POD#2 s/p rLTCS. Pain is well controlled. She is tolerating a regular diet and passing flatus. She is voiding spontaneously, and ambulating without difficulty. Denies CP/SOB. Denies lightheadedness/dizziness. Denies N/V.    O:  Vitals:  Vital Signs Last 24 Hrs  T(C): 37.1 (10 Oct 2024 22:00), Max: 37.4 (10 Oct 2024 10:21)  T(F): 98.7 (10 Oct 2024 22:00), Max: 99.4 (10 Oct 2024 10:21)  HR: 108 (10 Oct 2024 22:00) (102 - 108)  BP: 132/76 (10 Oct 2024 22:00) (120/61 - 132/76)  BP(mean): --  RR: 18 (10 Oct 2024 22:00) (18 - 18)  SpO2: 100% (10 Oct 2024 22:00) (98% - 100%)    Parameters below as of 10 Oct 2024 22:00  Patient On (Oxygen Delivery Method): room air        MEDICATIONS  (STANDING):  acetaminophen     Tablet .. 975 milliGRAM(s) Oral <User Schedule>  ascorbic acid 500 milliGRAM(s) Oral daily  diphtheria/tetanus/pertussis (acellular) Vaccine (Adacel) 0.5 milliLiter(s) IntraMuscular once  ferrous    sulfate 325 milliGRAM(s) Oral three times a day  heparin   Injectable 42091 Unit(s) SubCutaneous every 12 hours  ibuprofen  Tablet. 600 milliGRAM(s) Oral every 6 hours  influenza   Vaccine 0.5 milliLiter(s) IntraMuscular once  senna 2 Tablet(s) Oral at bedtime  sodium chloride 0.9% lock flush 3 milliLiter(s) IV Push every 8 hours      MEDICATIONS  (PRN):  diphenhydrAMINE 25 milliGRAM(s) Oral every 6 hours PRN Pruritus  lanolin Ointment 1 Application(s) Topical every 6 hours PRN Sore Nipples  magnesium hydroxide Suspension 30 milliLiter(s) Oral two times a day PRN Constipation  oxyCODONE    IR 5 milliGRAM(s) Oral every 3 hours PRN Moderate to Severe Pain (4-10)  oxyCODONE    IR 5 milliGRAM(s) Oral once PRN Moderate to Severe Pain (4-10)  simethicone 80 milliGRAM(s) Chew every 4 hours PRN Gas      Labs:  Blood type: O Positive  Rubella IgG: RPR: Negative                          9.5[L]   10.58[H] >-----------< 164    ( 10-10 @ 06:00 )             28.2[L]                        11.9   12.29[H] >-----------< 203    ( 10-09 @ 13:30 )             36.8        PE:  General: NAD  Abdomen: Soft, appropriately tender, incision c/d/i.  Extremities: No erythema, no pitting edema

## 2024-10-16 ENCOUNTER — APPOINTMENT (OUTPATIENT)
Dept: INTERNAL MEDICINE | Facility: CLINIC | Age: 74
End: 2024-10-16
Payer: MEDICARE

## 2024-10-16 VITALS
DIASTOLIC BLOOD PRESSURE: 79 MMHG | BODY MASS INDEX: 28.32 KG/M2 | WEIGHT: 170 LBS | HEIGHT: 65 IN | SYSTOLIC BLOOD PRESSURE: 108 MMHG | OXYGEN SATURATION: 97 % | TEMPERATURE: 98.1 F | HEART RATE: 90 BPM

## 2024-10-16 DIAGNOSIS — Z01.818 ENCOUNTER FOR OTHER PREPROCEDURAL EXAMINATION: ICD-10-CM

## 2024-10-16 PROCEDURE — 99213 OFFICE O/P EST LOW 20 MIN: CPT

## 2024-10-16 PROCEDURE — G2211 COMPLEX E/M VISIT ADD ON: CPT

## 2024-10-18 NOTE — CONSULT NOTE ADULT - SUBJECTIVE AND OBJECTIVE BOX
Detail Level: Zone HPI:  68 year old man w/ PMHx NICM HFrEF s/p heart transplant on 2/23/18 c/b post op graft dysfunction s/p plasmapheresis and IVIG x2 as well as rituximab who presented for RHC and biopsy. Also noted to have chronic diarrhea with concern for infection. He was suspected to have fungal infection 6/2018 treated with voriconazole. Also with recurrent pneumonia and has received multiple rounds of antibiotics. He is now s/p lung biopsy, where cultures did not grow anything. He also has been found to be c. diff positive and being treated with antibiotics per ID. Imaging showed persistent lung infiltrate, which was again biopsied (FNA) this admission. He continues to have diarrhea, as well as decreased appetite. He denies any history of anemia or lowering of other blood counts. No blood in stool or with cough. No PICA symptoms. No family history of blood disorders as far as he knows. No current fevers/chills. Heme consulted for anemia, as well as need for bone marrow biopsy.       PAST MEDICAL & SURGICAL HISTORY:  HLD (hyperlipidemia)  Former smoker  DVT of upper extremity (deep vein thrombosis)  Hepatitis C virus  GIB (gastrointestinal bleeding)  Ventricular fibrillation: s/p AICD  PAF (paroxysmal atrial fibrillation): on xarelto  Non-Ischemic Cardiomyopathy  SVT (Supraventricular Tachycardia)  HTN  CHF (Congestive Heart Failure)  S/P right heart catheterization: biopsy multiple  H/O heart transplant: 2/2018  Status post left hip replacement  History of Prior Ablation Treatment: for afib  AICD (Automatic Cardioverter/Defibrillator) Present: [employer] with 1 [employer] lead4/1/09- explanted and replaced with Medtronic 2 leads on 9/2/09      Allergies    No Known Allergies    Intolerances        MEDICATIONS  (STANDING):  aspirin enteric coated 81 milliGRAM(s) Oral daily  atovaquone Suspension 1500 milliGRAM(s) Oral daily  Calcium Citrate + Vit D 315mg/200 IU 2 Tablet(s) 2 Tablet(s) Oral <User Schedule>  cefepime   IVPB 2000 milliGRAM(s) IV Intermittent every 12 hours  dextrose 5%. 1000 milliLiter(s) (50 mL/Hr) IV Continuous <Continuous>  dextrose 50% Injectable 12.5 Gram(s) IV Push once  dextrose 50% Injectable 25 Gram(s) IV Push once  dextrose 50% Injectable 25 Gram(s) IV Push once  famotidine    Tablet 20 milliGRAM(s) Oral daily  insulin lispro (HumaLOG) corrective regimen sliding scale   SubCutaneous three times a day before meals  insulin lispro (HumaLOG) corrective regimen sliding scale   SubCutaneous at bedtime  lactated ringers. 1000 milliLiter(s) (100 mL/Hr) IV Continuous <Continuous>  metroNIDAZOLE  IVPB 500 milliGRAM(s) IV Intermittent every 8 hours  multivitamin 1 Tablet(s) Oral daily  pravastatin 20 milliGRAM(s) Oral at bedtime  predniSONE   Tablet 3 milliGRAM(s) Oral <User Schedule>  sodium bicarbonate 650 milliGRAM(s) Oral <User Schedule>  sodium chloride 0.9% lock flush 3 milliLiter(s) IV Push every 8 hours  sodium chloride 0.9%. 1000 milliLiter(s) (100 mL/Hr) IV Continuous <Continuous>  tacrolimus 0.5 milliGRAM(s) Oral <User Schedule>  tacrolimus    0.5 mG/mL Suspension 0.25 milliGRAM(s) Oral <User Schedule>  vancomycin    Solution 125 milliGRAM(s) Oral every 6 hours  voriconazole 200 milliGRAM(s) Oral every 12 hours    MEDICATIONS  (PRN):  acetaminophen   Tablet .. 650 milliGRAM(s) Oral every 6 hours PRN Temp greater or equal to 38C (100.4F)  dextrose 40% Gel 15 Gram(s) Oral once PRN Blood Glucose LESS THAN 70 milliGRAM(s)/deciliter  glucagon  Injectable 1 milliGRAM(s) IntraMuscular once PRN Glucose LESS THAN 70 milligrams/deciliter      FAMILY HISTORY:  No pertinent family history in first degree relatives. No blood disorders of hx of malignancy.       SOCIAL HISTORY: Former smoker, no active toxic habits. Lives alone.         VITALS:   T(F): 98.4 (09-09-18 @ 13:48), Max: 98.7 (09-09-18 @ 00:21)  HR: 120 (09-09-18 @ 13:48)  BP: 106/77 (09-09-18 @ 13:48)  RR: 18 (09-09-18 @ 13:48)  SpO2: 95% (09-09-18 @ 13:48)  Wt(kg): --    PHYSICAL EXAM    GENERAL: NAD, lying comfortably in bed   HEAD:  Atraumatic, Normocephalic  EYES: EOMI, PERRLA, conjunctiva and sclera clear  NECK: Supple, No JVD  CHEST/LUNG: Clear to auscultation bilaterally; No wheeze  HEART: Regular rate and rhythm; No murmurs, rubs, or gallops  ABDOMEN: Soft, Nontender, Nondistended; Bowel sounds present  EXTREMITIES:  2+ Peripheral Pulses, No clubbing, cyanosis, or edema  NEUROLOGY: non-focal  SKIN: No rashes or lesions    LABS:                         6.5    5.6   )-----------( 344      ( 09 Sep 2018 07:38 )             21.3     09-09    138  |  106  |  44<H>  ----------------------------<  103<H>  3.9   |  18<L>  |  1.37<H>    Ca    8.6      09 Sep 2018 07:38  Mg     1.9     09-09      Magnesium, Serum: 1.9 mg/dL (09-09 @ 07:38)      .Broncial  09-07 @ 13:51   Testing in progress  --  --      .Body Fluid Pleural Fluid  09-07 @ 13:02   Moderate Gram Variable Rods  --    Upon re-evaluation of gram stain:  polymorphonuclear leukocytes seen per low power field  Gram Positive Rods  by cytocentrifuge  Previously reported as:  Moderate polymorphonuclear leukocytes per low power field  No organisms seen per oil power field      .Blood Blood  09-07 @ 08:31   No growth to date.  --  --      .Blood Blood-Peripheral  09-05 @ 19:08   No growth to date.  --  --      .Blood Blood-Venous  09-05 @ 18:33   No growth to date.  --  --      .Body Fluid Lung - Upper Lobe Right  08-17 @ 21:35   Few Corynebacterium species  "Susceptibilities not performed"  --    polymorphonuclear leukocytes seen  No organisms seen  by cytocentrifuge      .Urine Clean Catch (Midstream)  08-15 @ 02:27   No growth  --  --      .Blood Blood-Peripheral  08-14 @ 21:27   No growth at 5 days.  --  --      .Abscess Arm - Right  08-14 @ 21:11   No growth at 5 days  --  --      .Sputum Sputum  08-14 @ 16:59   Normal Respiratory Heaven present  --    Rare polymorphonuclear leukocytes per low power field  Rare Squamous epithelial cells per low power field  Moderate Gram Negative Rods per oil power field      .Blood Blood  06-20 @ 08:51   No growth at 5 days.  --  --      .Blood Blood  06-20 @ 00:43   No growth at 5 days.  --  --      .Sputum Sputum  06-20 @ 00:38   Rare Pseudomonas aeruginosa (Carbapenem Resistant)  Normal Respiratory Heaven present  --  Pseudomonas aeruginosa (Carbapenem Resistant)      .Body Fluid Interstitial Fluid  06-19 @ 00:41   No acid fast bacilli isolated after 6 weeks.  --    No polymorphonuclear cells seen  No organisms seen  by cytocentrifuge      .Stool Feces  06-14 @ 22:23   No enteric pathogens isolated.  (Stool culture examined for Salmonella,  Shigella, Campylobacter, Aeromonas, Plesiomonas,  Vibrio, E.coli O157 and Yersinia)  --  --      .Sputum Sputum  06-13 @ 21:58   Rare Pseudomonas aeruginosa (Carbapenem Resistant)  Normal Respiratory Heaven present  --  Pseudomonas aeruginosa (Carbapenem Resistant)      .Blood Blood-Peripheral  06-13 @ 14:07   No growth at 5 days.  --  --      .Urine Clean Catch (Midstream)  06-13 @ 13:41   Culture grew 3 or more types of organisms which indicate  collection contamination; consider recollection only if clinically  indicated.  --  --          IMAGING: HPI:  68 year old man w/ PMHx NICM HFrEF s/p heart transplant on 2/23/18 c/b post op graft dysfunction s/p plasmapheresis and IVIG x2 as well as rituximab who presented for RHC and biopsy. Also noted to have chronic diarrhea with concern for infection. He was suspected to have fungal infection 6/2018 treated with voriconazole. Also with recurrent pneumonia and has received multiple rounds of antibiotics. He is now s/p lung biopsy, where cultures did not grow anything. He also has been found to be c. diff positive and being treated with antibiotics per ID. Imaging showed persistent lung infiltrate, which was again biopsied (FNA) this admission. He continues to have diarrhea, as well as decreased appetite. He denies any history of anemia or lowering of other blood counts. No blood in stool or with cough. No PICA symptoms. No family history of blood disorders as far as he knows. No current fevers/chills. Heme consulted for anemia, as well as possible need for bone marrow biopsy.       PAST MEDICAL & SURGICAL HISTORY:  HLD (hyperlipidemia)  Former smoker  DVT of upper extremity (deep vein thrombosis)  Hepatitis C virus  GIB (gastrointestinal bleeding)  Ventricular fibrillation: s/p AICD  PAF (paroxysmal atrial fibrillation): on xarelto  Non-Ischemic Cardiomyopathy  SVT (Supraventricular Tachycardia)  HTN  CHF (Congestive Heart Failure)  S/P right heart catheterization: biopsy multiple  H/O heart transplant: 2/2018  Status post left hip replacement  History of Prior Ablation Treatment: for afib  AICD (Automatic Cardioverter/Defibrillator) Present: [employer] with 1 [employer] lead4/1/09- explanted and replaced with Medtr[employer] 2 leads on 9/2/09      Allergies    No Known Allergies    Intolerances        MEDICATIONS  (STANDING):  aspirin enteric coated 81 milliGRAM(s) Oral daily  atovaquone Suspension 1500 milliGRAM(s) Oral daily  Calcium Citrate + Vit D 315mg/200 IU 2 Tablet(s) 2 Tablet(s) Oral <User Schedule>  cefepime   IVPB 2000 milliGRAM(s) IV Intermittent every 12 hours  dextrose 5%. 1000 milliLiter(s) (50 mL/Hr) IV Continuous <Continuous>  dextrose 50% Injectable 12.5 Gram(s) IV Push once  dextrose 50% Injectable 25 Gram(s) IV Push once  dextrose 50% Injectable 25 Gram(s) IV Push once  famotidine    Tablet 20 milliGRAM(s) Oral daily  insulin lispro (HumaLOG) corrective regimen sliding scale   SubCutaneous three times a day before meals  insulin lispro (HumaLOG) corrective regimen sliding scale   SubCutaneous at bedtime  lactated ringers. 1000 milliLiter(s) (100 mL/Hr) IV Continuous <Continuous>  metroNIDAZOLE  IVPB 500 milliGRAM(s) IV Intermittent every 8 hours  multivitamin 1 Tablet(s) Oral daily  pravastatin 20 milliGRAM(s) Oral at bedtime  predniSONE   Tablet 3 milliGRAM(s) Oral <User Schedule>  sodium bicarbonate 650 milliGRAM(s) Oral <User Schedule>  sodium chloride 0.9% lock flush 3 milliLiter(s) IV Push every 8 hours  sodium chloride 0.9%. 1000 milliLiter(s) (100 mL/Hr) IV Continuous <Continuous>  tacrolimus 0.5 milliGRAM(s) Oral <User Schedule>  tacrolimus    0.5 mG/mL Suspension 0.25 milliGRAM(s) Oral <User Schedule>  vancomycin    Solution 125 milliGRAM(s) Oral every 6 hours  voriconazole 200 milliGRAM(s) Oral every 12 hours    MEDICATIONS  (PRN):  acetaminophen   Tablet .. 650 milliGRAM(s) Oral every 6 hours PRN Temp greater or equal to 38C (100.4F)  dextrose 40% Gel 15 Gram(s) Oral once PRN Blood Glucose LESS THAN 70 milliGRAM(s)/deciliter  glucagon  Injectable 1 milliGRAM(s) IntraMuscular once PRN Glucose LESS THAN 70 milligrams/deciliter      FAMILY HISTORY:  No pertinent family history in first degree relatives. No blood disorders of hx of malignancy.       SOCIAL HISTORY: Former smoker, no active toxic habits. Lives alone.         VITALS:   T(F): 98.4 (09-09-18 @ 13:48), Max: 98.7 (09-09-18 @ 00:21)  HR: 120 (09-09-18 @ 13:48)  BP: 106/77 (09-09-18 @ 13:48)  RR: 18 (09-09-18 @ 13:48)  SpO2: 95% (09-09-18 @ 13:48)  Wt(kg): --    PHYSICAL EXAM    GENERAL: NAD, lying comfortably in bed   HEAD:  Atraumatic, Normocephalic  EYES: EOMI, PERRLA, conjunctiva and sclera clear  NECK: Supple, No JVD  CHEST/LUNG: Clear to auscultation bilaterally; No wheeze  HEART: Regular rate and rhythm; No murmurs, rubs, or gallops  ABDOMEN: Soft, Nontender, Nondistended; Bowel sounds present  EXTREMITIES:  2+ Peripheral Pulses, No clubbing, cyanosis, or edema  NEUROLOGY: non-focal  SKIN: No rashes or lesions    LABS:                         6.5    5.6   )-----------( 344      ( 09 Sep 2018 07:38 )             21.3     09-09    138  |  106  |  44<H>  ----------------------------<  103<H>  3.9   |  18<L>  |  1.37<H>    Ca    8.6      09 Sep 2018 07:38  Mg     1.9     09-09      Magnesium, Serum: 1.9 mg/dL (09-09 @ 07:38)      .Broncial  09-07 @ 13:51   Testing in progress  --  --      .Body Fluid Pleural Fluid  09-07 @ 13:02   Moderate Gram Variable Rods  --    Upon re-evaluation of gram stain:  polymorphonuclear leukocytes seen per low power field  Gram Positive Rods  by cytocentrifuge  Previously reported as:  Moderate polymorphonuclear leukocytes per low power field  No organisms seen per oil power field      .Blood Blood  09-07 @ 08:31   No growth to date.  --  --      .Blood Blood-Peripheral  09-05 @ 19:08   No growth to date.  --  --      .Blood Blood-Venous  09-05 @ 18:33   No growth to date.  --  --      .Body Fluid Lung - Upper Lobe Right  08-17 @ 21:35   Few Corynebacterium species  "Susceptibilities not performed"  --    polymorphonuclear leukocytes seen  No organisms seen  by cytocentrifuge      .Urine Clean Catch (Midstream)  08-15 @ 02:27   No growth  --  --      .Blood Blood-Peripheral  08-14 @ 21:27   No growth at 5 days.  --  --      .Abscess Arm - Right  08-14 @ 21:11   No growth at 5 days  --  --      .Sputum Sputum  08-14 @ 16:59   Normal Respiratory Heaven present  --    Rare polymorphonuclear leukocytes per low power field  Rare Squamous epithelial cells per low power field  Moderate Gram Negative Rods per oil power field      .Blood Blood  06-20 @ 08:51   No growth at 5 days.  --  --      .Blood Blood  06-20 @ 00:43   No growth at 5 days.  --  --      .Sputum Sputum  06-20 @ 00:38   Rare Pseudomonas aeruginosa (Carbapenem Resistant)  Normal Respiratory Heaven present  --  Pseudomonas aeruginosa (Carbapenem Resistant)      .Body Fluid Interstitial Fluid  06-19 @ 00:41   No acid fast bacilli isolated after 6 weeks.  --    No polymorphonuclear cells seen  No organisms seen  by cytocentrifuge      .Stool Feces  06-14 @ 22:23   No enteric pathogens isolated.  (Stool culture examined for Salmonella,  Shigella, Campylobacter, Aeromonas, Plesiomonas,  Vibrio, E.coli O157 and Yersinia)  --  --      .Sputum Sputum  06-13 @ 21:58   Rare Pseudomonas aeruginosa (Carbapenem Resistant)  Normal Respiratory Heaven present  --  Pseudomonas aeruginosa (Carbapenem Resistant)      .Blood Blood-Peripheral  06-13 @ 14:07   No growth at 5 days.  --  --      .Urine Clean Catch (Midstream)  06-13 @ 13:41   Culture grew 3 or more types of organisms which indicate  collection contamination; consider recollection only if clinically  indicated.  --  --          IMAGING:

## 2024-10-30 ENCOUNTER — NON-APPOINTMENT (OUTPATIENT)
Age: 74
End: 2024-10-30

## 2024-10-30 ENCOUNTER — APPOINTMENT (OUTPATIENT)
Dept: OPHTHALMOLOGY | Facility: EYE CENTER | Age: 74
End: 2024-10-30
Payer: MEDICARE

## 2024-10-30 PROCEDURE — 66984 XCAPSL CTRC RMVL W/O ECP: CPT | Mod: 79,LT

## 2024-10-31 ENCOUNTER — APPOINTMENT (OUTPATIENT)
Dept: OPHTHALMOLOGY | Facility: CLINIC | Age: 74
End: 2024-10-31
Payer: MEDICARE

## 2024-10-31 ENCOUNTER — NON-APPOINTMENT (OUTPATIENT)
Age: 74
End: 2024-10-31

## 2024-10-31 PROCEDURE — 99024 POSTOP FOLLOW-UP VISIT: CPT

## 2024-11-01 NOTE — PROGRESS NOTE ADULT - PROBLEM SELECTOR PROBLEM 2
Gastrointestinal hemorrhage with melena MEDICATIONS  (STANDING):  allopurinol 100 milliGRAM(s) Oral daily  chlorhexidine 2% Cloths 1 Application(s) Topical daily  furosemide   Injectable 80 milliGRAM(s) IV Push two times a day  heparin  Infusion.  Unit(s)/Hr (11 mL/Hr) IV Continuous <Continuous>  influenza  Vaccine (HIGH DOSE) 0.5 milliLiter(s) IntraMuscular once  metoprolol tartrate Injectable 2.5 milliGRAM(s) IV Push every 6 hours  piperacillin/tazobactam IVPB.. 3.375 Gram(s) IV Intermittent every 12 hours    MEDICATIONS  (PRN):  heparin   Injectable 4500 Unit(s) IV Push every 6 hours PRN For aPTT less than 40  heparin   Injectable 2000 Unit(s) IV Push every 6 hours PRN For aPTT between 40 - 57   LVAD (left ventricular assist device) present

## 2024-11-06 ENCOUNTER — NON-APPOINTMENT (OUTPATIENT)
Age: 74
End: 2024-11-06

## 2024-11-06 ENCOUNTER — APPOINTMENT (OUTPATIENT)
Dept: OPHTHALMOLOGY | Facility: CLINIC | Age: 74
End: 2024-11-06

## 2024-11-06 PROCEDURE — 99024 POSTOP FOLLOW-UP VISIT: CPT

## 2024-11-11 ENCOUNTER — APPOINTMENT (OUTPATIENT)
Dept: HEART FAILURE | Facility: CLINIC | Age: 74
End: 2024-11-11
Payer: MEDICARE

## 2024-11-11 VITALS
BODY MASS INDEX: 29.16 KG/M2 | OXYGEN SATURATION: 98 % | WEIGHT: 175 LBS | DIASTOLIC BLOOD PRESSURE: 90 MMHG | TEMPERATURE: 98.1 F | HEIGHT: 65 IN | SYSTOLIC BLOOD PRESSURE: 133 MMHG | HEART RATE: 93 BPM

## 2024-11-11 DIAGNOSIS — M85.80 OTHER SPECIFIED DISORDERS OF BONE DENSITY AND STRUCTURE, UNSPECIFIED SITE: ICD-10-CM

## 2024-11-11 DIAGNOSIS — Z94.1 HEART TRANSPLANT STATUS: ICD-10-CM

## 2024-11-11 DIAGNOSIS — D59.13 MIXED TYPE AUTOIMMUNE HEMOLYTIC ANEMIA: ICD-10-CM

## 2024-11-11 DIAGNOSIS — D84.9 IMMUNODEFICIENCY, UNSPECIFIED: ICD-10-CM

## 2024-11-11 DIAGNOSIS — I10 ESSENTIAL (PRIMARY) HYPERTENSION: ICD-10-CM

## 2024-11-11 PROCEDURE — 99214 OFFICE O/P EST MOD 30 MIN: CPT

## 2024-11-13 ENCOUNTER — APPOINTMENT (OUTPATIENT)
Dept: INTERNAL MEDICINE | Facility: CLINIC | Age: 74
End: 2024-11-13
Payer: MEDICARE

## 2024-11-13 VITALS
OXYGEN SATURATION: 97 % | SYSTOLIC BLOOD PRESSURE: 121 MMHG | HEART RATE: 94 BPM | DIASTOLIC BLOOD PRESSURE: 84 MMHG | BODY MASS INDEX: 29.16 KG/M2 | WEIGHT: 175 LBS | HEIGHT: 65 IN

## 2024-11-13 DIAGNOSIS — Z23 ENCOUNTER FOR IMMUNIZATION: ICD-10-CM

## 2024-11-13 DIAGNOSIS — Z87.891 PERSONAL HISTORY OF NICOTINE DEPENDENCE: ICD-10-CM

## 2024-11-13 DIAGNOSIS — R42 DIZZINESS AND GIDDINESS: ICD-10-CM

## 2024-11-13 PROCEDURE — 99213 OFFICE O/P EST LOW 20 MIN: CPT

## 2024-11-13 PROCEDURE — 90662 IIV NO PRSV INCREASED AG IM: CPT

## 2024-11-13 PROCEDURE — G0008: CPT

## 2024-11-13 PROCEDURE — G2211 COMPLEX E/M VISIT ADD ON: CPT

## 2024-11-14 NOTE — ED ADULT NURSE NOTE - NSFALLRSKINDICATORS_ED_ALL_ED
[de-identified] : Referred by: Adamaris Mathew   HPI: 72 year old female here for her f/u medical weight loss appointment. PMHx is significant for class 3 obesity s/p sleeve gastrectomy in 2015 now with weight regain and class 1 obesity, dyslipidemia, hx of thyroid cancer, insomnia, grief rxn, hypothyroidism and right knee OA s/p R knee replacement.    Weight History: Highest/Pre-op Weight: 233 lbs Alfred Weight: 145 -150 lbs Weight at [facility]: 184 lbs/33.12 kg/m2 (Aug 2024) Review of Systems is also notable for:  Rare GERD Denies knee pain Firm BMs (takes benefiber) Denies HAs Denies numbness and tingling Denies SOB/CP   Previous Weight Loss Efforts: Has struggled with her weight for years. Underwent a sleeve gastrectomy in 2015. Pre-op weight was 233 lbs. Alfred weight was 145-150 lbs.  Comfortable weight was 160slbs. Was able to maintain weight for years until had increased knee pain --> dx of right knee OA for which she underwent right knee replacement in . PCP started her on the generic components of Contrave - bupropion and naltrexone - has helped, but continues to struggle with night eating. Since starting on bupropion - BP has trended up with SBP in the 140s.  Started by her PCP in 2024.   Continues to take her isaiah supplements   Previous use of AOMs: Generic components of Contrave (bupropion and naltrexone) as above Medications that may have contributed to weight gain: None   Pertinent [facility] (May 2024): TChol 221H  TGs 60  H FBG 72 Cr 0.54 L  [facility] Aug 2024:  Vitamin B12 1328 H  Vitamin B1 219 H Vit D 83   Eating behaviors: Sits and eats at night  Bored eating Emotional eating Has various food sensitivities Still has restriction  Craves sweet and crunch Feels as if she "always needs something in her mouth" Salt makes her sick    Movement: Used to walk the mall pre-pandemic  SocHx:  in 2023 (  of CHF) Lives with daughter and granddaughter social alcohol  former smoker  Sleep: Goes to bed between 1-2 AM Gets 3-4 hrs of sleep Takes a sleeping pill Up by 5 AM- Has coffee Then eats at 8   Mental Health: Lost  in Nov Continues to grieve Goes to grief group 2x/month   Reproductive/Preventive Screenings: Colonoscopy UTD Mammo UTD Pap UTD  
yes

## 2024-11-15 ENCOUNTER — RX RENEWAL (OUTPATIENT)
Age: 74
End: 2024-11-15

## 2024-12-04 ENCOUNTER — NON-APPOINTMENT (OUTPATIENT)
Age: 74
End: 2024-12-04

## 2024-12-04 ENCOUNTER — APPOINTMENT (OUTPATIENT)
Dept: OPHTHALMOLOGY | Facility: CLINIC | Age: 74
End: 2024-12-04
Payer: MEDICARE

## 2024-12-04 PROCEDURE — 99024 POSTOP FOLLOW-UP VISIT: CPT

## 2024-12-04 NOTE — DISCHARGE NOTE PROVIDER - NSDCQMPCI_CARD_ALL_CORE
In an effort to ensure that our patients [facility], a Team Member has reviewed your chart and identified an opportunity to provide the best care possible. An attempt was made to discuss or schedule due or overdue Preventive or Chronic Condition care.Care Gaps identified: Hypertension.    The Outcome was Contact was not made, letter/portal message sent.  We are attempting to schedule a follow up office visit. If you have any questions or need help with scheduling, contact our Health Outreach Team at [phone].   Type of Appointment needed: Primary Care Visit   No

## 2024-12-09 ENCOUNTER — OUTPATIENT (OUTPATIENT)
Dept: OUTPATIENT SERVICES | Facility: HOSPITAL | Age: 74
LOS: 1 days | End: 2024-12-09
Payer: MEDICARE

## 2024-12-09 ENCOUNTER — APPOINTMENT (OUTPATIENT)
Dept: CT IMAGING | Facility: IMAGING CENTER | Age: 74
End: 2024-12-09
Payer: MEDICARE

## 2024-12-09 ENCOUNTER — NON-APPOINTMENT (OUTPATIENT)
Age: 74
End: 2024-12-09

## 2024-12-09 VITALS — HEIGHT: 68 IN | WEIGHT: 174 LBS | BODY MASS INDEX: 26.37 KG/M2

## 2024-12-09 DIAGNOSIS — Z87.891 PERSONAL HISTORY OF NICOTINE DEPENDENCE: ICD-10-CM

## 2024-12-09 DIAGNOSIS — Z94.1 HEART TRANSPLANT STATUS: Chronic | ICD-10-CM

## 2024-12-09 PROCEDURE — 71271 CT THORAX LUNG CANCER SCR C-: CPT | Mod: 26

## 2024-12-09 PROCEDURE — 71271 CT THORAX LUNG CANCER SCR C-: CPT

## 2024-12-16 ENCOUNTER — NON-APPOINTMENT (OUTPATIENT)
Age: 74
End: 2024-12-16

## 2024-12-17 ENCOUNTER — RX RENEWAL (OUTPATIENT)
Age: 74
End: 2024-12-17

## 2024-12-24 NOTE — ED ADULT TRIAGE NOTE - BMI (KG/M2)
[facility] PEDRO GILLIAM at [facility], [facility]    Colonoscopy,biopsy  05/28/2013    Procedure:  COLONOSCOPY, POSSIBLE BIOPSY, POSSIBLE POLYPECTOMY 36826;  Surgeon: Lloyd Mojica MD;  Location: [facility]    Colonoscopy,remv lesn,[id number];  Surgeon: Lloyd Mojica MD;  Location: [facility]    Patient withough preoperative order for iv antibiotic surgical site infection prophylaxis.  05/28/2013    Procedure: COLONOSCOPY, POSSIBLE BIOPSY, POSSIBLE POLYPECTOMY 94823;  Surgeon: Lloyd Mojica MD;  Location: [facility]    CPT=74177)  COMPARISON: [facility],  BLADDER ONLY (CPT=76857), major discrepancies.   Dictated by (CST): Rigo Swift LEIDA Coburn Hospitalist  Pager [phone]    12/23/2024  6:23 PM         [1] No Known Allergies     28.2

## 2025-01-01 ENCOUNTER — OUTPATIENT (OUTPATIENT)
Dept: OUTPATIENT SERVICES | Facility: HOSPITAL | Age: 75
LOS: 1 days | Discharge: HOME | End: 2025-01-01

## 2025-01-01 ENCOUNTER — INPATIENT (INPATIENT)
Facility: HOSPITAL | Age: 75
LOS: 17 days | DRG: 870 | End: 2025-03-09
Attending: HOSPITALIST | Admitting: STUDENT IN AN ORGANIZED HEALTH CARE EDUCATION/TRAINING PROGRAM
Payer: MEDICARE

## 2025-01-01 VITALS
HEIGHT: 72 IN | DIASTOLIC BLOOD PRESSURE: 84 MMHG | SYSTOLIC BLOOD PRESSURE: 133 MMHG | RESPIRATION RATE: 20 BRPM | HEART RATE: 70 BPM | OXYGEN SATURATION: 96 % | WEIGHT: 199.96 LBS

## 2025-01-01 DIAGNOSIS — I82.409 ACUTE EMBOLISM AND THROMBOSIS OF UNSPECIFIED DEEP VEINS OF UNSPECIFIED LOWER EXTREMITY: ICD-10-CM

## 2025-01-01 DIAGNOSIS — M79.89 OTHER SPECIFIED SOFT TISSUE DISORDERS: ICD-10-CM

## 2025-01-01 DIAGNOSIS — Z94.1 HEART TRANSPLANT STATUS: Chronic | ICD-10-CM

## 2025-01-01 DIAGNOSIS — H61.20 IMPACTED CERUMEN, UNSPECIFIED EAR: ICD-10-CM

## 2025-01-01 DIAGNOSIS — G93.41 METABOLIC ENCEPHALOPATHY: ICD-10-CM

## 2025-01-01 DIAGNOSIS — D84.9 IMMUNODEFICIENCY, UNSPECIFIED: ICD-10-CM

## 2025-01-01 DIAGNOSIS — I10 ESSENTIAL (PRIMARY) HYPERTENSION: ICD-10-CM

## 2025-01-01 DIAGNOSIS — Z79.2 LONG TERM (CURRENT) USE OF ANTIBIOTICS: ICD-10-CM

## 2025-01-01 DIAGNOSIS — I48.0 PAROXYSMAL ATRIAL FIBRILLATION: ICD-10-CM

## 2025-01-01 DIAGNOSIS — N17.9 ACUTE KIDNEY FAILURE, UNSPECIFIED: ICD-10-CM

## 2025-01-01 DIAGNOSIS — D58.9 HEREDITARY HEMOLYTIC ANEMIA, UNSPECIFIED: ICD-10-CM

## 2025-01-01 DIAGNOSIS — R41.82 ALTERED MENTAL STATUS, UNSPECIFIED: ICD-10-CM

## 2025-01-01 DIAGNOSIS — N18.30 CHRONIC KIDNEY DISEASE, STAGE 3 UNSPECIFIED: ICD-10-CM

## 2025-01-01 DIAGNOSIS — R65.10 SYSTEMIC INFLAMMATORY RESPONSE SYNDROME (SIRS) OF NON-INFECTIOUS ORIGIN WITHOUT ACUTE ORGAN DYSFUNCTION: ICD-10-CM

## 2025-01-01 DIAGNOSIS — E87.5 HYPERKALEMIA: ICD-10-CM

## 2025-01-01 DIAGNOSIS — K56.7 ILEUS, UNSPECIFIED: ICD-10-CM

## 2025-01-01 DIAGNOSIS — A41.9 SEPSIS, UNSPECIFIED ORGANISM: ICD-10-CM

## 2025-01-01 DIAGNOSIS — I73.9 PERIPHERAL VASCULAR DISEASE, UNSPECIFIED: ICD-10-CM

## 2025-01-01 DIAGNOSIS — J96.02 ACUTE RESPIRATORY FAILURE WITH HYPERCAPNIA: ICD-10-CM

## 2025-01-01 DIAGNOSIS — Z29.9 ENCOUNTER FOR PROPHYLACTIC MEASURES, UNSPECIFIED: ICD-10-CM

## 2025-01-01 DIAGNOSIS — Z94.1 HEART TRANSPLANT STATUS: ICD-10-CM

## 2025-01-01 DIAGNOSIS — K92.0 HEMATEMESIS: ICD-10-CM

## 2025-01-01 DIAGNOSIS — N18.4 CHRONIC KIDNEY DISEASE, STAGE 4 (SEVERE): ICD-10-CM

## 2025-01-01 DIAGNOSIS — D64.9 ANEMIA, UNSPECIFIED: ICD-10-CM

## 2025-01-01 DIAGNOSIS — E11.9 TYPE 2 DIABETES MELLITUS WITHOUT COMPLICATIONS: ICD-10-CM

## 2025-01-01 LAB
-  AMIKACIN: SIGNIFICANT CHANGE UP
-  AZTREONAM: SIGNIFICANT CHANGE UP
-  BLOOD PCR PANEL: SIGNIFICANT CHANGE UP
-  CEFEPIME: SIGNIFICANT CHANGE UP
-  CEFTRIAXONE: SIGNIFICANT CHANGE UP
-  CIPROFLOXACIN: SIGNIFICANT CHANGE UP
-  GENTAMICIN: SIGNIFICANT CHANGE UP
-  LEVOFLOXACIN: SIGNIFICANT CHANGE UP
-  MEROPENEM: SIGNIFICANT CHANGE UP
-  PIPERACILLIN/TAZOBACTAM: SIGNIFICANT CHANGE UP
-  TOBRAMYCIN: SIGNIFICANT CHANGE UP
-  TRIMETHOPRIM/SULFAMETHOXAZOLE: SIGNIFICANT CHANGE UP
A1C WITH ESTIMATED AVERAGE GLUCOSE RESULT: 5.8 % — HIGH (ref 4–5.6)
A1C WITH ESTIMATED AVERAGE GLUCOSE RESULT: 5.8 % — HIGH (ref 4–5.6)
ACANTHOCYTES BLD QL SMEAR: SLIGHT — SIGNIFICANT CHANGE UP
ACANTHOCYTES BLD QL SMEAR: SLIGHT — SIGNIFICANT CHANGE UP
ACE SERPL-CCNC: 9 U/L — LOW (ref 14–82)
ADAMTS13 ACTIVITY: 56 % — SIGNIFICANT CHANGE UP (ref 40–130)
ADD ON TEST-SPECIMEN IN LAB: SIGNIFICANT CHANGE UP
ALBUMIN SERPL ELPH-MCNC: 1.1 G/DL — LOW (ref 3.3–5)
ALBUMIN SERPL ELPH-MCNC: 1.6 G/DL — LOW (ref 3.3–5)
ALBUMIN SERPL ELPH-MCNC: 1.8 G/DL — LOW (ref 3.3–5)
ALBUMIN SERPL ELPH-MCNC: 1.9 G/DL — LOW (ref 3.3–5)
ALBUMIN SERPL ELPH-MCNC: 1.9 G/DL — LOW (ref 3.3–5)
ALBUMIN SERPL ELPH-MCNC: 2 G/DL — LOW (ref 3.3–5)
ALBUMIN SERPL ELPH-MCNC: 2.1 G/DL — LOW (ref 3.3–5)
ALBUMIN SERPL ELPH-MCNC: 2.2 G/DL — LOW (ref 3.3–5)
ALBUMIN SERPL ELPH-MCNC: 2.3 G/DL — LOW (ref 3.3–5)
ALBUMIN SERPL ELPH-MCNC: 2.3 G/DL — LOW (ref 3.3–5)
ALBUMIN SERPL ELPH-MCNC: 2.4 G/DL — LOW (ref 3.3–5)
ALBUMIN SERPL ELPH-MCNC: 2.5 G/DL — LOW (ref 3.3–5)
ALBUMIN SERPL ELPH-MCNC: 2.6 G/DL — LOW (ref 3.3–5)
ALBUMIN SERPL ELPH-MCNC: 2.7 G/DL — LOW (ref 3.3–5)
ALBUMIN SERPL ELPH-MCNC: 2.8 G/DL — LOW (ref 3.3–5)
ALBUMIN SERPL ELPH-MCNC: 3 G/DL — LOW (ref 3.3–5)
ALBUMIN SERPL ELPH-MCNC: 3.2 G/DL — LOW (ref 3.3–5)
ALBUMIN SERPL ELPH-MCNC: 3.7 G/DL — SIGNIFICANT CHANGE UP (ref 3.3–5)
ALP SERPL-CCNC: 117 U/L — SIGNIFICANT CHANGE UP (ref 40–120)
ALP SERPL-CCNC: 124 U/L — HIGH (ref 40–120)
ALP SERPL-CCNC: 190 U/L — HIGH (ref 40–120)
ALP SERPL-CCNC: 28 U/L — LOW (ref 40–120)
ALP SERPL-CCNC: 282 U/L — HIGH (ref 40–120)
ALP SERPL-CCNC: 283 U/L — HIGH (ref 40–120)
ALP SERPL-CCNC: 340 U/L — HIGH (ref 40–120)
ALP SERPL-CCNC: 369 U/L — HIGH (ref 40–120)
ALP SERPL-CCNC: 45 U/L — SIGNIFICANT CHANGE UP (ref 40–120)
ALP SERPL-CCNC: 46 U/L — SIGNIFICANT CHANGE UP (ref 40–120)
ALP SERPL-CCNC: 48 U/L — SIGNIFICANT CHANGE UP (ref 40–120)
ALP SERPL-CCNC: 49 U/L — SIGNIFICANT CHANGE UP (ref 40–120)
ALP SERPL-CCNC: 50 U/L — SIGNIFICANT CHANGE UP (ref 40–120)
ALP SERPL-CCNC: 50 U/L — SIGNIFICANT CHANGE UP (ref 40–120)
ALP SERPL-CCNC: 51 U/L — SIGNIFICANT CHANGE UP (ref 40–120)
ALP SERPL-CCNC: 52 U/L — SIGNIFICANT CHANGE UP (ref 40–120)
ALP SERPL-CCNC: 52 U/L — SIGNIFICANT CHANGE UP (ref 40–120)
ALP SERPL-CCNC: 53 U/L — SIGNIFICANT CHANGE UP (ref 40–120)
ALP SERPL-CCNC: 54 U/L — SIGNIFICANT CHANGE UP (ref 40–120)
ALP SERPL-CCNC: 57 U/L — SIGNIFICANT CHANGE UP (ref 40–120)
ALP SERPL-CCNC: 60 U/L — SIGNIFICANT CHANGE UP (ref 40–120)
ALP SERPL-CCNC: 60 U/L — SIGNIFICANT CHANGE UP (ref 40–120)
ALP SERPL-CCNC: 62 U/L — SIGNIFICANT CHANGE UP (ref 40–120)
ALP SERPL-CCNC: 66 U/L — SIGNIFICANT CHANGE UP (ref 40–120)
ALP SERPL-CCNC: 75 U/L — SIGNIFICANT CHANGE UP (ref 40–120)
ALP SERPL-CCNC: 85 U/L — SIGNIFICANT CHANGE UP (ref 40–120)
ALP SERPL-CCNC: 87 U/L — SIGNIFICANT CHANGE UP (ref 40–120)
ALT FLD-CCNC: 108 U/L — HIGH (ref 10–45)
ALT FLD-CCNC: 11 U/L — SIGNIFICANT CHANGE UP (ref 10–45)
ALT FLD-CCNC: 11 U/L — SIGNIFICANT CHANGE UP (ref 10–45)
ALT FLD-CCNC: 118 U/L — HIGH (ref 10–45)
ALT FLD-CCNC: 12 U/L — SIGNIFICANT CHANGE UP (ref 10–45)
ALT FLD-CCNC: 13 U/L — SIGNIFICANT CHANGE UP (ref 10–45)
ALT FLD-CCNC: 14 U/L — SIGNIFICANT CHANGE UP (ref 10–45)
ALT FLD-CCNC: 142 U/L — HIGH (ref 10–45)
ALT FLD-CCNC: 15 U/L — SIGNIFICANT CHANGE UP (ref 10–45)
ALT FLD-CCNC: 16 U/L — SIGNIFICANT CHANGE UP (ref 10–45)
ALT FLD-CCNC: 17 U/L — SIGNIFICANT CHANGE UP (ref 10–45)
ALT FLD-CCNC: 172 U/L — HIGH (ref 10–45)
ALT FLD-CCNC: 179 U/L — HIGH (ref 10–45)
ALT FLD-CCNC: 181 U/L — HIGH (ref 10–45)
ALT FLD-CCNC: 20 U/L — SIGNIFICANT CHANGE UP (ref 10–45)
ALT FLD-CCNC: 20 U/L — SIGNIFICANT CHANGE UP (ref 10–45)
ALT FLD-CCNC: 21 U/L — SIGNIFICANT CHANGE UP (ref 10–45)
ALT FLD-CCNC: 23 U/L — SIGNIFICANT CHANGE UP (ref 10–45)
ALT FLD-CCNC: 24 U/L — SIGNIFICANT CHANGE UP (ref 10–45)
ALT FLD-CCNC: 24 U/L — SIGNIFICANT CHANGE UP (ref 10–45)
ALT FLD-CCNC: 25 U/L — SIGNIFICANT CHANGE UP (ref 10–45)
ALT FLD-CCNC: 26 U/L — SIGNIFICANT CHANGE UP (ref 10–45)
ALT FLD-CCNC: 310 U/L — HIGH (ref 10–45)
ALT FLD-CCNC: 569 U/L — HIGH (ref 10–45)
ALT FLD-CCNC: 610 U/L — HIGH (ref 10–45)
ALT FLD-CCNC: 9 U/L — LOW (ref 10–45)
AMMONIA BLD-MCNC: 19 UMOL/L — SIGNIFICANT CHANGE UP (ref 11–55)
AMMONIA BLD-MCNC: 22 UMOL/L — SIGNIFICANT CHANGE UP (ref 11–55)
AMYLASE P1 CFR SERPL: 184 U/L — HIGH (ref 25–125)
AMYLASE P1 CFR SERPL: 187 U/L — HIGH (ref 25–125)
AMYLASE P1 CFR SERPL: 205 U/L — HIGH (ref 25–125)
AMYLASE P1 CFR SERPL: 220 U/L — HIGH (ref 25–125)
ANION GAP SERPL CALC-SCNC: 10 MMOL/L — SIGNIFICANT CHANGE UP (ref 5–17)
ANION GAP SERPL CALC-SCNC: 11 MMOL/L — SIGNIFICANT CHANGE UP (ref 5–17)
ANION GAP SERPL CALC-SCNC: 12 MMOL/L — SIGNIFICANT CHANGE UP (ref 5–17)
ANION GAP SERPL CALC-SCNC: 13 MMOL/L — SIGNIFICANT CHANGE UP (ref 5–17)
ANION GAP SERPL CALC-SCNC: 14 MMOL/L — SIGNIFICANT CHANGE UP (ref 5–17)
ANION GAP SERPL CALC-SCNC: 15 MMOL/L — SIGNIFICANT CHANGE UP (ref 5–17)
ANION GAP SERPL CALC-SCNC: 16 MMOL/L — SIGNIFICANT CHANGE UP (ref 5–17)
ANION GAP SERPL CALC-SCNC: 17 MMOL/L — SIGNIFICANT CHANGE UP (ref 5–17)
ANION GAP SERPL CALC-SCNC: 17 MMOL/L — SIGNIFICANT CHANGE UP (ref 5–17)
ANION GAP SERPL CALC-SCNC: 18 MMOL/L — HIGH (ref 5–17)
ANION GAP SERPL CALC-SCNC: 19 MMOL/L — HIGH (ref 5–17)
ANION GAP SERPL CALC-SCNC: 20 MMOL/L — HIGH (ref 5–17)
ANION GAP SERPL CALC-SCNC: 20 MMOL/L — HIGH (ref 5–17)
ANION GAP SERPL CALC-SCNC: 21 MMOL/L — HIGH (ref 5–17)
ANION GAP SERPL CALC-SCNC: 21 MMOL/L — HIGH (ref 5–17)
ANION GAP SERPL CALC-SCNC: 22 MMOL/L — HIGH (ref 5–17)
ANION GAP SERPL CALC-SCNC: 22 MMOL/L — HIGH (ref 5–17)
ANION GAP SERPL CALC-SCNC: 23 MMOL/L — HIGH (ref 5–17)
ANION GAP SERPL CALC-SCNC: 7 MMOL/L — SIGNIFICANT CHANGE UP (ref 5–17)
ANION GAP SERPL CALC-SCNC: 8 MMOL/L — SIGNIFICANT CHANGE UP (ref 5–17)
ANION GAP SERPL CALC-SCNC: 9 MMOL/L — SIGNIFICANT CHANGE UP (ref 5–17)
ANISOCYTOSIS BLD QL: SIGNIFICANT CHANGE UP
ANISOCYTOSIS BLD QL: SIGNIFICANT CHANGE UP
ANISOCYTOSIS BLD QL: SLIGHT — SIGNIFICANT CHANGE UP
APPEARANCE UR: ABNORMAL
APPEARANCE UR: CLEAR — SIGNIFICANT CHANGE UP
APTT BLD: 102.8 SEC — HIGH (ref 24.5–35.6)
APTT BLD: 22.5 SEC — LOW (ref 24.5–35.6)
APTT BLD: 25.4 SEC — SIGNIFICANT CHANGE UP (ref 24.5–35.6)
APTT BLD: 27.8 SEC — SIGNIFICANT CHANGE UP (ref 24.5–35.6)
APTT BLD: 28.4 SEC — SIGNIFICANT CHANGE UP (ref 24.5–35.6)
APTT BLD: 28.7 SEC — SIGNIFICANT CHANGE UP (ref 24.5–35.6)
APTT BLD: 29.7 SEC — SIGNIFICANT CHANGE UP (ref 24.5–35.6)
APTT BLD: 29.9 SEC — SIGNIFICANT CHANGE UP (ref 24.5–35.6)
APTT BLD: 30.6 SEC — SIGNIFICANT CHANGE UP (ref 24.5–35.6)
APTT BLD: 31.5 SEC — SIGNIFICANT CHANGE UP (ref 24.5–35.6)
APTT BLD: 32.4 SEC — SIGNIFICANT CHANGE UP (ref 24.5–35.6)
APTT BLD: 33.9 SEC — SIGNIFICANT CHANGE UP (ref 24.5–35.6)
APTT BLD: 34.6 SEC — SIGNIFICANT CHANGE UP (ref 24.5–35.6)
APTT BLD: 35.6 SEC — SIGNIFICANT CHANGE UP (ref 24.5–35.6)
APTT BLD: 36.6 SEC — HIGH (ref 24.5–35.6)
APTT BLD: 39.3 SEC — HIGH (ref 24.5–35.6)
APTT BLD: 45 SEC — HIGH (ref 24.5–35.6)
APTT BLD: 52.1 SEC — HIGH (ref 24.5–35.6)
APTT BLD: 60.1 SEC — HIGH (ref 24.5–35.6)
APTT BLD: 61.1 SEC — HIGH (ref 24.5–35.6)
APTT BLD: 70.9 SEC — HIGH (ref 24.5–35.6)
APTT BLD: 71.7 SEC — HIGH (ref 24.5–35.6)
APTT BLD: 80.9 SEC — HIGH (ref 24.5–35.6)
APTT BLD: 87.5 SEC — HIGH (ref 24.5–35.6)
AST SERPL-CCNC: 1091 U/L — HIGH (ref 10–40)
AST SERPL-CCNC: 16 U/L — SIGNIFICANT CHANGE UP (ref 10–40)
AST SERPL-CCNC: 19 U/L — SIGNIFICANT CHANGE UP (ref 10–40)
AST SERPL-CCNC: 20 U/L — SIGNIFICANT CHANGE UP (ref 10–40)
AST SERPL-CCNC: 22 U/L — SIGNIFICANT CHANGE UP (ref 10–40)
AST SERPL-CCNC: 23 U/L — SIGNIFICANT CHANGE UP (ref 10–40)
AST SERPL-CCNC: 23 U/L — SIGNIFICANT CHANGE UP (ref 10–40)
AST SERPL-CCNC: 2364 U/L — HIGH (ref 10–40)
AST SERPL-CCNC: 25 U/L — SIGNIFICANT CHANGE UP (ref 10–40)
AST SERPL-CCNC: 28 U/L — SIGNIFICANT CHANGE UP (ref 10–40)
AST SERPL-CCNC: 28 U/L — SIGNIFICANT CHANGE UP (ref 10–40)
AST SERPL-CCNC: 285 U/L — HIGH (ref 10–40)
AST SERPL-CCNC: 29 U/L — SIGNIFICANT CHANGE UP (ref 10–40)
AST SERPL-CCNC: 29 U/L — SIGNIFICANT CHANGE UP (ref 10–40)
AST SERPL-CCNC: 30 U/L — SIGNIFICANT CHANGE UP (ref 10–40)
AST SERPL-CCNC: 31 U/L — SIGNIFICANT CHANGE UP (ref 10–40)
AST SERPL-CCNC: 31 U/L — SIGNIFICANT CHANGE UP (ref 10–40)
AST SERPL-CCNC: 32 U/L — SIGNIFICANT CHANGE UP (ref 10–40)
AST SERPL-CCNC: 3290 U/L — HIGH (ref 10–40)
AST SERPL-CCNC: 33 U/L — SIGNIFICANT CHANGE UP (ref 10–40)
AST SERPL-CCNC: 34 U/L — SIGNIFICANT CHANGE UP (ref 10–40)
AST SERPL-CCNC: 35 U/L — SIGNIFICANT CHANGE UP (ref 10–40)
AST SERPL-CCNC: 36 U/L — SIGNIFICANT CHANGE UP (ref 10–40)
AST SERPL-CCNC: 36 U/L — SIGNIFICANT CHANGE UP (ref 10–40)
AST SERPL-CCNC: 377 U/L — HIGH (ref 10–40)
AST SERPL-CCNC: 38 U/L — SIGNIFICANT CHANGE UP (ref 10–40)
AST SERPL-CCNC: 41 U/L — HIGH (ref 10–40)
AST SERPL-CCNC: 43 U/L — HIGH (ref 10–40)
AST SERPL-CCNC: 45 U/L — HIGH (ref 10–40)
AST SERPL-CCNC: 52 U/L — HIGH (ref 10–40)
AST SERPL-CCNC: 60 U/L — HIGH (ref 10–40)
AST SERPL-CCNC: 61 U/L — HIGH (ref 10–40)
AST SERPL-CCNC: 65 U/L — HIGH (ref 10–40)
AST SERPL-CCNC: 660 U/L — HIGH (ref 10–40)
AST SERPL-CCNC: 68 U/L — HIGH (ref 10–40)
AST SERPL-CCNC: 875 U/L — HIGH (ref 10–40)
AST SERPL-CCNC: 930 U/L — HIGH (ref 10–40)
AST SERPL-CCNC: 986 U/L — HIGH (ref 10–40)
B BURGDOR C6 AB SER-ACNC: NEGATIVE — SIGNIFICANT CHANGE UP
B BURGDOR DNA SPEC QL NAA+PROBE: NEGATIVE — SIGNIFICANT CHANGE UP
B BURGDOR IGG+IGM SER-ACNC: 0.06 INDEX — SIGNIFICANT CHANGE UP (ref 0.01–0.9)
B-OH-BUTYR SERPL-SCNC: 0.9 MMOL/L — HIGH
BACTERIA # UR AUTO: ABNORMAL /HPF
BASE EXCESS BLDA CALC-SCNC: -11.6 MMOL/L — LOW (ref -2–3)
BASE EXCESS BLDA CALC-SCNC: -6.1 MMOL/L — LOW (ref -2–3)
BASE EXCESS BLDA CALC-SCNC: -8.3 MMOL/L — LOW (ref -2–3)
BASE EXCESS BLDA CALC-SCNC: -8.8 MMOL/L — LOW (ref -2–3)
BASOPHILS # BLD AUTO: 0 K/UL — SIGNIFICANT CHANGE UP (ref 0–0.2)
BASOPHILS # BLD AUTO: 0.01 K/UL — SIGNIFICANT CHANGE UP (ref 0–0.2)
BASOPHILS # BLD AUTO: 0.02 K/UL — SIGNIFICANT CHANGE UP (ref 0–0.2)
BASOPHILS # BLD AUTO: 0.03 K/UL — SIGNIFICANT CHANGE UP (ref 0–0.2)
BASOPHILS # BLD AUTO: 0.03 K/UL — SIGNIFICANT CHANGE UP (ref 0–0.2)
BASOPHILS # BLD AUTO: 0.04 K/UL — SIGNIFICANT CHANGE UP (ref 0–0.2)
BASOPHILS # BLD AUTO: 0.05 K/UL — SIGNIFICANT CHANGE UP (ref 0–0.2)
BASOPHILS # BLD AUTO: 0.06 K/UL — SIGNIFICANT CHANGE UP (ref 0–0.2)
BASOPHILS # BLD AUTO: 0.09 K/UL — SIGNIFICANT CHANGE UP (ref 0–0.2)
BASOPHILS NFR BLD AUTO: 0 % — SIGNIFICANT CHANGE UP (ref 0–2)
BASOPHILS NFR BLD AUTO: 0.1 % — SIGNIFICANT CHANGE UP (ref 0–2)
BASOPHILS NFR BLD AUTO: 0.2 % — SIGNIFICANT CHANGE UP (ref 0–2)
BILIRUB DIRECT SERPL-MCNC: 0.3 MG/DL — SIGNIFICANT CHANGE UP (ref 0–0.3)
BILIRUB DIRECT SERPL-MCNC: 1.2 MG/DL — HIGH (ref 0–0.3)
BILIRUB DIRECT SERPL-MCNC: 1.5 MG/DL — HIGH (ref 0–0.3)
BILIRUB DIRECT SERPL-MCNC: 1.9 MG/DL — HIGH (ref 0–0.3)
BILIRUB DIRECT SERPL-MCNC: 1.9 MG/DL — HIGH (ref 0–0.3)
BILIRUB INDIRECT FLD-MCNC: 0.6 MG/DL — SIGNIFICANT CHANGE UP (ref 0.2–1)
BILIRUB INDIRECT FLD-MCNC: 0.7 MG/DL — SIGNIFICANT CHANGE UP (ref 0.2–1)
BILIRUB INDIRECT FLD-MCNC: 0.7 MG/DL — SIGNIFICANT CHANGE UP (ref 0.2–1)
BILIRUB INDIRECT FLD-MCNC: 0.8 MG/DL — SIGNIFICANT CHANGE UP (ref 0.2–1)
BILIRUB INDIRECT FLD-MCNC: 1 MG/DL — SIGNIFICANT CHANGE UP (ref 0.2–1)
BILIRUB SERPL-MCNC: 0.2 MG/DL — SIGNIFICANT CHANGE UP (ref 0.2–1.2)
BILIRUB SERPL-MCNC: 0.4 MG/DL — SIGNIFICANT CHANGE UP (ref 0.2–1.2)
BILIRUB SERPL-MCNC: 0.5 MG/DL — SIGNIFICANT CHANGE UP (ref 0.2–1.2)
BILIRUB SERPL-MCNC: 0.5 MG/DL — SIGNIFICANT CHANGE UP (ref 0.2–1.2)
BILIRUB SERPL-MCNC: 0.6 MG/DL — SIGNIFICANT CHANGE UP (ref 0.2–1.2)
BILIRUB SERPL-MCNC: 0.7 MG/DL — SIGNIFICANT CHANGE UP (ref 0.2–1.2)
BILIRUB SERPL-MCNC: 0.8 MG/DL — SIGNIFICANT CHANGE UP (ref 0.2–1.2)
BILIRUB SERPL-MCNC: 0.9 MG/DL — SIGNIFICANT CHANGE UP (ref 0.2–1.2)
BILIRUB SERPL-MCNC: 1 MG/DL — SIGNIFICANT CHANGE UP (ref 0.2–1.2)
BILIRUB SERPL-MCNC: 1.1 MG/DL — SIGNIFICANT CHANGE UP (ref 0.2–1.2)
BILIRUB SERPL-MCNC: 1.2 MG/DL — SIGNIFICANT CHANGE UP (ref 0.2–1.2)
BILIRUB SERPL-MCNC: 1.3 MG/DL — HIGH (ref 0.2–1.2)
BILIRUB SERPL-MCNC: 1.3 MG/DL — HIGH (ref 0.2–1.2)
BILIRUB SERPL-MCNC: 1.6 MG/DL — HIGH (ref 0.2–1.2)
BILIRUB SERPL-MCNC: 1.9 MG/DL — HIGH (ref 0.2–1.2)
BILIRUB SERPL-MCNC: 2.3 MG/DL — HIGH (ref 0.2–1.2)
BILIRUB SERPL-MCNC: 2.6 MG/DL — HIGH (ref 0.2–1.2)
BILIRUB SERPL-MCNC: 2.9 MG/DL — HIGH (ref 0.2–1.2)
BILIRUB UR-MCNC: ABNORMAL
BILIRUB UR-MCNC: NEGATIVE — SIGNIFICANT CHANGE UP
BKV DNA SPEC QL NAA+PROBE: SIGNIFICANT CHANGE UP
BKV DNA SPEC QL NAA+PROBE: SIGNIFICANT CHANGE UP
BLD GP AB SCN SERPL QL: NEGATIVE — SIGNIFICANT CHANGE UP
BUN SERPL-MCNC: 106 MG/DL — HIGH (ref 7–23)
BUN SERPL-MCNC: 111 MG/DL — HIGH (ref 7–23)
BUN SERPL-MCNC: 122 MG/DL — HIGH (ref 7–23)
BUN SERPL-MCNC: 15 MG/DL — SIGNIFICANT CHANGE UP (ref 7–23)
BUN SERPL-MCNC: 15 MG/DL — SIGNIFICANT CHANGE UP (ref 7–23)
BUN SERPL-MCNC: 16 MG/DL — SIGNIFICANT CHANGE UP (ref 7–23)
BUN SERPL-MCNC: 17 MG/DL — SIGNIFICANT CHANGE UP (ref 7–23)
BUN SERPL-MCNC: 19 MG/DL — SIGNIFICANT CHANGE UP (ref 7–23)
BUN SERPL-MCNC: 20 MG/DL — SIGNIFICANT CHANGE UP (ref 7–23)
BUN SERPL-MCNC: 21 MG/DL — SIGNIFICANT CHANGE UP (ref 7–23)
BUN SERPL-MCNC: 21 MG/DL — SIGNIFICANT CHANGE UP (ref 7–23)
BUN SERPL-MCNC: 22 MG/DL — SIGNIFICANT CHANGE UP (ref 7–23)
BUN SERPL-MCNC: 23 MG/DL — SIGNIFICANT CHANGE UP (ref 7–23)
BUN SERPL-MCNC: 23 MG/DL — SIGNIFICANT CHANGE UP (ref 7–23)
BUN SERPL-MCNC: 24 MG/DL — HIGH (ref 7–23)
BUN SERPL-MCNC: 26 MG/DL — HIGH (ref 7–23)
BUN SERPL-MCNC: 29 MG/DL — HIGH (ref 7–23)
BUN SERPL-MCNC: 33 MG/DL — HIGH (ref 7–23)
BUN SERPL-MCNC: 40 MG/DL — HIGH (ref 7–23)
BUN SERPL-MCNC: 40 MG/DL — HIGH (ref 7–23)
BUN SERPL-MCNC: 47 MG/DL — HIGH (ref 7–23)
BUN SERPL-MCNC: 49 MG/DL — HIGH (ref 7–23)
BUN SERPL-MCNC: 55 MG/DL — HIGH (ref 7–23)
BUN SERPL-MCNC: 57 MG/DL — HIGH (ref 7–23)
BUN SERPL-MCNC: 57 MG/DL — HIGH (ref 7–23)
BUN SERPL-MCNC: 64 MG/DL — HIGH (ref 7–23)
BUN SERPL-MCNC: 64 MG/DL — HIGH (ref 7–23)
BUN SERPL-MCNC: 66 MG/DL — HIGH (ref 7–23)
BUN SERPL-MCNC: 68 MG/DL — HIGH (ref 7–23)
BUN SERPL-MCNC: 68 MG/DL — HIGH (ref 7–23)
BUN SERPL-MCNC: 69 MG/DL — HIGH (ref 7–23)
BUN SERPL-MCNC: 71 MG/DL — HIGH (ref 7–23)
BUN SERPL-MCNC: 72 MG/DL — HIGH (ref 7–23)
BUN SERPL-MCNC: 73 MG/DL — HIGH (ref 7–23)
BUN SERPL-MCNC: 76 MG/DL — HIGH (ref 7–23)
BUN SERPL-MCNC: 80 MG/DL — HIGH (ref 7–23)
BUN SERPL-MCNC: 82 MG/DL — HIGH (ref 7–23)
BUN SERPL-MCNC: 84 MG/DL — HIGH (ref 7–23)
BUN SERPL-MCNC: 96 MG/DL — HIGH (ref 7–23)
BUN SERPL-MCNC: 96 MG/DL — HIGH (ref 7–23)
BUN SERPL-MCNC: 97 MG/DL — HIGH (ref 7–23)
BUN SERPL-MCNC: 98 MG/DL — HIGH (ref 7–23)
BURR CELLS BLD QL SMEAR: PRESENT — SIGNIFICANT CHANGE UP
BURR CELLS BLD QL SMEAR: SLIGHT — SIGNIFICANT CHANGE UP
CALCIUM SERPL-MCNC: 5.8 MG/DL — CRITICAL LOW (ref 8.4–10.5)
CALCIUM SERPL-MCNC: 6.3 MG/DL — CRITICAL LOW (ref 8.4–10.5)
CALCIUM SERPL-MCNC: 6.7 MG/DL — LOW (ref 8.4–10.5)
CALCIUM SERPL-MCNC: 6.8 MG/DL — LOW (ref 8.4–10.5)
CALCIUM SERPL-MCNC: 6.8 MG/DL — LOW (ref 8.4–10.5)
CALCIUM SERPL-MCNC: 7 MG/DL — LOW (ref 8.4–10.5)
CALCIUM SERPL-MCNC: 7.1 MG/DL — LOW (ref 8.4–10.5)
CALCIUM SERPL-MCNC: 7.2 MG/DL — LOW (ref 8.4–10.5)
CALCIUM SERPL-MCNC: 7.3 MG/DL — LOW (ref 8.4–10.5)
CALCIUM SERPL-MCNC: 7.3 MG/DL — LOW (ref 8.4–10.5)
CALCIUM SERPL-MCNC: 7.4 MG/DL — LOW (ref 8.4–10.5)
CALCIUM SERPL-MCNC: 7.5 MG/DL — LOW (ref 8.4–10.5)
CALCIUM SERPL-MCNC: 7.6 MG/DL — LOW (ref 8.4–10.5)
CALCIUM SERPL-MCNC: 7.6 MG/DL — LOW (ref 8.4–10.5)
CALCIUM SERPL-MCNC: 7.7 MG/DL — LOW (ref 8.4–10.5)
CALCIUM SERPL-MCNC: 7.8 MG/DL — LOW (ref 8.4–10.5)
CALCIUM SERPL-MCNC: 7.9 MG/DL — LOW (ref 8.4–10.5)
CALCIUM SERPL-MCNC: 8 MG/DL — LOW (ref 8.4–10.5)
CALCIUM SERPL-MCNC: 8.2 MG/DL — LOW (ref 8.4–10.5)
CALCIUM SERPL-MCNC: 8.3 MG/DL — LOW (ref 8.4–10.5)
CALCIUM SERPL-MCNC: 8.5 MG/DL — SIGNIFICANT CHANGE UP (ref 8.4–10.5)
CALCIUM SERPL-MCNC: 8.5 MG/DL — SIGNIFICANT CHANGE UP (ref 8.4–10.5)
CALCIUM SERPL-MCNC: 8.6 MG/DL — SIGNIFICANT CHANGE UP (ref 8.4–10.5)
CALCIUM SERPL-MCNC: 9 MG/DL — SIGNIFICANT CHANGE UP (ref 8.4–10.5)
CALCIUM SERPL-MCNC: 9.1 MG/DL — SIGNIFICANT CHANGE UP (ref 8.4–10.5)
CALCIUM SERPL-MCNC: 9.1 MG/DL — SIGNIFICANT CHANGE UP (ref 8.4–10.5)
CALCIUM SERPL-MCNC: 9.2 MG/DL — SIGNIFICANT CHANGE UP (ref 8.4–10.5)
CALCIUM SERPL-MCNC: 9.3 MG/DL — SIGNIFICANT CHANGE UP (ref 8.4–10.5)
CAST: >63 /LPF — HIGH (ref 0–4)
CHLORIDE SERPL-SCNC: 100 MMOL/L — SIGNIFICANT CHANGE UP (ref 96–108)
CHLORIDE SERPL-SCNC: 101 MMOL/L — SIGNIFICANT CHANGE UP (ref 96–108)
CHLORIDE SERPL-SCNC: 102 MMOL/L — SIGNIFICANT CHANGE UP (ref 96–108)
CHLORIDE SERPL-SCNC: 103 MMOL/L — SIGNIFICANT CHANGE UP (ref 96–108)
CHLORIDE SERPL-SCNC: 104 MMOL/L — SIGNIFICANT CHANGE UP (ref 96–108)
CHLORIDE SERPL-SCNC: 105 MMOL/L — SIGNIFICANT CHANGE UP (ref 96–108)
CHLORIDE SERPL-SCNC: 106 MMOL/L — SIGNIFICANT CHANGE UP (ref 96–108)
CHLORIDE SERPL-SCNC: 107 MMOL/L — SIGNIFICANT CHANGE UP (ref 96–108)
CHLORIDE SERPL-SCNC: 112 MMOL/L — HIGH (ref 96–108)
CHLORIDE SERPL-SCNC: 99 MMOL/L — SIGNIFICANT CHANGE UP (ref 96–108)
CHLORIDE SERPL-SCNC: 99 MMOL/L — SIGNIFICANT CHANGE UP (ref 96–108)
CHOLEST SERPL-MCNC: 95 MG/DL — SIGNIFICANT CHANGE UP
CK SERPL-CCNC: 1819 U/L — HIGH (ref 30–200)
CK SERPL-CCNC: 1876 U/L — HIGH (ref 30–200)
CK SERPL-CCNC: 2204 U/L — HIGH (ref 30–200)
CMV DNA CSF QL NAA+PROBE: SIGNIFICANT CHANGE UP IU/ML
CMV DNA CSF QL NAA+PROBE: SIGNIFICANT CHANGE UP IU/ML
CMV DNA SPEC NAA+PROBE-LOG#: SIGNIFICANT CHANGE UP LOG10IU/ML
CMV DNA SPEC NAA+PROBE-LOG#: SIGNIFICANT CHANGE UP LOG10IU/ML
CO2 BLDA-SCNC: 20 MMOL/L — SIGNIFICANT CHANGE UP (ref 19–24)
CO2 BLDA-SCNC: 20 MMOL/L — SIGNIFICANT CHANGE UP (ref 19–24)
CO2 BLDA-SCNC: 24 MMOL/L — SIGNIFICANT CHANGE UP (ref 19–24)
CO2 BLDA-SCNC: 29 MMOL/L — HIGH (ref 19–24)
CO2 SERPL-SCNC: 11 MMOL/L — LOW (ref 22–31)
CO2 SERPL-SCNC: 13 MMOL/L — LOW (ref 22–31)
CO2 SERPL-SCNC: 14 MMOL/L — LOW (ref 22–31)
CO2 SERPL-SCNC: 14 MMOL/L — LOW (ref 22–31)
CO2 SERPL-SCNC: 15 MMOL/L — LOW (ref 22–31)
CO2 SERPL-SCNC: 15 MMOL/L — LOW (ref 22–31)
CO2 SERPL-SCNC: 16 MMOL/L — LOW (ref 22–31)
CO2 SERPL-SCNC: 17 MMOL/L — LOW (ref 22–31)
CO2 SERPL-SCNC: 19 MMOL/L — LOW (ref 22–31)
CO2 SERPL-SCNC: 20 MMOL/L — LOW (ref 22–31)
CO2 SERPL-SCNC: 21 MMOL/L — LOW (ref 22–31)
CO2 SERPL-SCNC: 22 MMOL/L — SIGNIFICANT CHANGE UP (ref 22–31)
CO2 SERPL-SCNC: 23 MMOL/L — SIGNIFICANT CHANGE UP (ref 22–31)
CO2 SERPL-SCNC: 24 MMOL/L — SIGNIFICANT CHANGE UP (ref 22–31)
CO2 SERPL-SCNC: 25 MMOL/L — SIGNIFICANT CHANGE UP (ref 22–31)
COLOR SPEC: ABNORMAL
COLOR SPEC: YELLOW — SIGNIFICANT CHANGE UP
CREAT SERPL-MCNC: 1.35 MG/DL — HIGH (ref 0.5–1.3)
CREAT SERPL-MCNC: 1.4 MG/DL — HIGH (ref 0.5–1.3)
CREAT SERPL-MCNC: 1.4 MG/DL — HIGH (ref 0.5–1.3)
CREAT SERPL-MCNC: 1.43 MG/DL — HIGH (ref 0.5–1.3)
CREAT SERPL-MCNC: 1.43 MG/DL — HIGH (ref 0.5–1.3)
CREAT SERPL-MCNC: 1.48 MG/DL — HIGH (ref 0.5–1.3)
CREAT SERPL-MCNC: 1.58 MG/DL — HIGH (ref 0.5–1.3)
CREAT SERPL-MCNC: 1.6 MG/DL — HIGH (ref 0.5–1.3)
CREAT SERPL-MCNC: 1.62 MG/DL — HIGH (ref 0.5–1.3)
CREAT SERPL-MCNC: 1.62 MG/DL — HIGH (ref 0.5–1.3)
CREAT SERPL-MCNC: 1.63 MG/DL — HIGH (ref 0.5–1.3)
CREAT SERPL-MCNC: 1.64 MG/DL — HIGH (ref 0.5–1.3)
CREAT SERPL-MCNC: 1.68 MG/DL — HIGH (ref 0.5–1.3)
CREAT SERPL-MCNC: 1.75 MG/DL — HIGH (ref 0.5–1.3)
CREAT SERPL-MCNC: 1.76 MG/DL — HIGH (ref 0.5–1.3)
CREAT SERPL-MCNC: 1.77 MG/DL — HIGH (ref 0.5–1.3)
CREAT SERPL-MCNC: 1.79 MG/DL — HIGH (ref 0.5–1.3)
CREAT SERPL-MCNC: 1.85 MG/DL — HIGH (ref 0.5–1.3)
CREAT SERPL-MCNC: 1.86 MG/DL — HIGH (ref 0.5–1.3)
CREAT SERPL-MCNC: 1.86 MG/DL — HIGH (ref 0.5–1.3)
CREAT SERPL-MCNC: 1.87 MG/DL — HIGH (ref 0.5–1.3)
CREAT SERPL-MCNC: 1.92 MG/DL — HIGH (ref 0.5–1.3)
CREAT SERPL-MCNC: 1.94 MG/DL — HIGH (ref 0.5–1.3)
CREAT SERPL-MCNC: 2.02 MG/DL — HIGH (ref 0.5–1.3)
CREAT SERPL-MCNC: 2.06 MG/DL — HIGH (ref 0.5–1.3)
CREAT SERPL-MCNC: 2.1 MG/DL — HIGH (ref 0.5–1.3)
CREAT SERPL-MCNC: 2.2 MG/DL — HIGH (ref 0.5–1.3)
CREAT SERPL-MCNC: 2.49 MG/DL — HIGH (ref 0.5–1.3)
CREAT SERPL-MCNC: 2.66 MG/DL — HIGH (ref 0.5–1.3)
CREAT SERPL-MCNC: 2.75 MG/DL — HIGH (ref 0.5–1.3)
CREAT SERPL-MCNC: 3.09 MG/DL — HIGH (ref 0.5–1.3)
CREAT SERPL-MCNC: 3.19 MG/DL — HIGH (ref 0.5–1.3)
CREAT SERPL-MCNC: 3.24 MG/DL — HIGH (ref 0.5–1.3)
CREAT SERPL-MCNC: 3.8 MG/DL — HIGH (ref 0.5–1.3)
CREAT SERPL-MCNC: 3.86 MG/DL — HIGH (ref 0.5–1.3)
CREAT SERPL-MCNC: 3.95 MG/DL — HIGH (ref 0.5–1.3)
CREAT SERPL-MCNC: 4.34 MG/DL — HIGH (ref 0.5–1.3)
CREAT SERPL-MCNC: 4.53 MG/DL — HIGH (ref 0.5–1.3)
CREAT SERPL-MCNC: 4.65 MG/DL — HIGH (ref 0.5–1.3)
CREAT SERPL-MCNC: 4.82 MG/DL — HIGH (ref 0.5–1.3)
CREAT SERPL-MCNC: 5.11 MG/DL — HIGH (ref 0.5–1.3)
CREAT SERPL-MCNC: 5.13 MG/DL — HIGH (ref 0.5–1.3)
CREAT SERPL-MCNC: 5.32 MG/DL — HIGH (ref 0.5–1.3)
CREAT SERPL-MCNC: 6.24 MG/DL — HIGH (ref 0.5–1.3)
CREAT SERPL-MCNC: 6.64 MG/DL — HIGH (ref 0.5–1.3)
CREAT SERPL-MCNC: 6.68 MG/DL — HIGH (ref 0.5–1.3)
CREAT SERPL-MCNC: 6.89 MG/DL — HIGH (ref 0.5–1.3)
CREAT SERPL-MCNC: 7.17 MG/DL — HIGH (ref 0.5–1.3)
CREAT SERPL-MCNC: 7.32 MG/DL — HIGH (ref 0.5–1.3)
CRP SERPL-MCNC: 420 MG/L — HIGH (ref 0–4)
CRYPTOC AG FLD QL: NEGATIVE — SIGNIFICANT CHANGE UP
CULTURE RESULTS: ABNORMAL
CULTURE RESULTS: ABNORMAL
CULTURE RESULTS: SIGNIFICANT CHANGE UP
DACRYOCYTES BLD QL SMEAR: SLIGHT — SIGNIFICANT CHANGE UP
DIFF PNL FLD: ABNORMAL
DIFF PNL FLD: NEGATIVE — SIGNIFICANT CHANGE UP
EBV DNA SERPL NAA+PROBE-ACNC: SIGNIFICANT CHANGE UP IU/ML
EBVPCR LOG: SIGNIFICANT CHANGE UP LOG10IU/ML
EGFR: 11 ML/MIN/1.73M2 — LOW
EGFR: 12 ML/MIN/1.73M2 — LOW
EGFR: 13 ML/MIN/1.73M2 — LOW
EGFR: 13 ML/MIN/1.73M2 — LOW
EGFR: 14 ML/MIN/1.73M2 — LOW
EGFR: 14 ML/MIN/1.73M2 — LOW
EGFR: 15 ML/MIN/1.73M2 — LOW
EGFR: 15 ML/MIN/1.73M2 — LOW
EGFR: 16 ML/MIN/1.73M2 — LOW
EGFR: 19 ML/MIN/1.73M2 — LOW
EGFR: 19 ML/MIN/1.73M2 — LOW
EGFR: 20 ML/MIN/1.73M2 — LOW
EGFR: 23 ML/MIN/1.73M2 — LOW
EGFR: 23 ML/MIN/1.73M2 — LOW
EGFR: 24 ML/MIN/1.73M2 — LOW
EGFR: 24 ML/MIN/1.73M2 — LOW
EGFR: 26 ML/MIN/1.73M2 — LOW
EGFR: 26 ML/MIN/1.73M2 — LOW
EGFR: 31 ML/MIN/1.73M2 — LOW
EGFR: 31 ML/MIN/1.73M2 — LOW
EGFR: 32 ML/MIN/1.73M2 — LOW
EGFR: 32 ML/MIN/1.73M2 — LOW
EGFR: 33 ML/MIN/1.73M2 — LOW
EGFR: 33 ML/MIN/1.73M2 — LOW
EGFR: 34 ML/MIN/1.73M2 — LOW
EGFR: 34 ML/MIN/1.73M2 — LOW
EGFR: 36 ML/MIN/1.73M2 — LOW
EGFR: 37 ML/MIN/1.73M2 — LOW
EGFR: 37 ML/MIN/1.73M2 — LOW
EGFR: 38 ML/MIN/1.73M2 — LOW
EGFR: 39 ML/MIN/1.73M2 — LOW
EGFR: 39 ML/MIN/1.73M2 — LOW
EGFR: 40 ML/MIN/1.73M2 — LOW
EGFR: 42 ML/MIN/1.73M2 — LOW
EGFR: 42 ML/MIN/1.73M2 — LOW
EGFR: 44 ML/MIN/1.73M2 — LOW
EGFR: 45 ML/MIN/1.73M2 — LOW
EGFR: 45 ML/MIN/1.73M2 — LOW
EGFR: 46 ML/MIN/1.73M2 — LOW
EGFR: 46 ML/MIN/1.73M2 — LOW
EGFR: 49 ML/MIN/1.73M2 — LOW
EGFR: 49 ML/MIN/1.73M2 — LOW
EGFR: 51 ML/MIN/1.73M2 — LOW
EGFR: 53 ML/MIN/1.73M2 — LOW
EGFR: 55 ML/MIN/1.73M2 — LOW
EGFR: 55 ML/MIN/1.73M2 — LOW
EGFR: 7 ML/MIN/1.73M2 — LOW
EGFR: 8 ML/MIN/1.73M2 — LOW
EGFR: 9 ML/MIN/1.73M2 — LOW
EGFR: 9 ML/MIN/1.73M2 — LOW
ELLIPTOCYTES BLD QL SMEAR: SLIGHT — SIGNIFICANT CHANGE UP
EOSINOPHIL # BLD AUTO: 0 K/UL — SIGNIFICANT CHANGE UP (ref 0–0.5)
EOSINOPHIL # BLD AUTO: 0.01 K/UL — SIGNIFICANT CHANGE UP (ref 0–0.5)
EOSINOPHIL # BLD AUTO: 0.07 K/UL — SIGNIFICANT CHANGE UP (ref 0–0.5)
EOSINOPHIL NFR BLD AUTO: 0 % — SIGNIFICANT CHANGE UP (ref 0–6)
EOSINOPHIL NFR BLD AUTO: 0.1 % — SIGNIFICANT CHANGE UP (ref 0–6)
EOSINOPHIL NFR BLD AUTO: 0.2 % — SIGNIFICANT CHANGE UP (ref 0–6)
ERYTHROCYTE [SEDIMENTATION RATE] IN BLOOD: 83 MM/HR — HIGH (ref 0–20)
ESTIMATED AVERAGE GLUCOSE: 120 MG/DL — HIGH (ref 68–114)
ESTIMATED AVERAGE GLUCOSE: 120 MG/DL — HIGH (ref 68–114)
FIBRINOGEN PPP-MCNC: 183 MG/DL — LOW (ref 200–445)
FIBRINOGEN PPP-MCNC: 282 MG/DL — SIGNIFICANT CHANGE UP (ref 200–445)
FIBRINOGEN PPP-MCNC: 314 MG/DL — SIGNIFICANT CHANGE UP (ref 200–445)
FIBRINOGEN PPP-MCNC: 328 MG/DL — SIGNIFICANT CHANGE UP (ref 200–445)
FLUAV AG NPH QL: SIGNIFICANT CHANGE UP
FLUBV AG NPH QL: SIGNIFICANT CHANGE UP
FOLATE SERPL-MCNC: 6 NG/ML — SIGNIFICANT CHANGE UP
FUNGITELL: <31 PG/ML — SIGNIFICANT CHANGE UP
GALACTOMANNAN AG SERPL-ACNC: 0.03 INDEX — SIGNIFICANT CHANGE UP (ref 0–0.49)
GAS PNL BLDA: SIGNIFICANT CHANGE UP
GAS PNL BLDV: SIGNIFICANT CHANGE UP
GGT SERPL-CCNC: 71 U/L — HIGH (ref 9–50)
GI PCR PANEL: SIGNIFICANT CHANGE UP
GIANT PLATELETS BLD QL SMEAR: PRESENT — SIGNIFICANT CHANGE UP
GIANT PLATELETS BLD QL SMEAR: PRESENT — SIGNIFICANT CHANGE UP
GLUCOSE BLDC GLUCOMTR-MCNC: 105 MG/DL — HIGH (ref 70–99)
GLUCOSE BLDC GLUCOMTR-MCNC: 105 MG/DL — HIGH (ref 70–99)
GLUCOSE BLDC GLUCOMTR-MCNC: 108 MG/DL — HIGH (ref 70–99)
GLUCOSE BLDC GLUCOMTR-MCNC: 109 MG/DL — HIGH (ref 70–99)
GLUCOSE BLDC GLUCOMTR-MCNC: 109 MG/DL — HIGH (ref 70–99)
GLUCOSE BLDC GLUCOMTR-MCNC: 111 MG/DL — HIGH (ref 70–99)
GLUCOSE BLDC GLUCOMTR-MCNC: 111 MG/DL — HIGH (ref 70–99)
GLUCOSE BLDC GLUCOMTR-MCNC: 112 MG/DL — HIGH (ref 70–99)
GLUCOSE BLDC GLUCOMTR-MCNC: 112 MG/DL — HIGH (ref 70–99)
GLUCOSE BLDC GLUCOMTR-MCNC: 115 MG/DL — HIGH (ref 70–99)
GLUCOSE BLDC GLUCOMTR-MCNC: 120 MG/DL — HIGH (ref 70–99)
GLUCOSE BLDC GLUCOMTR-MCNC: 121 MG/DL — HIGH (ref 70–99)
GLUCOSE BLDC GLUCOMTR-MCNC: 122 MG/DL — HIGH (ref 70–99)
GLUCOSE BLDC GLUCOMTR-MCNC: 123 MG/DL — HIGH (ref 70–99)
GLUCOSE BLDC GLUCOMTR-MCNC: 123 MG/DL — HIGH (ref 70–99)
GLUCOSE BLDC GLUCOMTR-MCNC: 125 MG/DL — HIGH (ref 70–99)
GLUCOSE BLDC GLUCOMTR-MCNC: 125 MG/DL — HIGH (ref 70–99)
GLUCOSE BLDC GLUCOMTR-MCNC: 126 MG/DL — HIGH (ref 70–99)
GLUCOSE BLDC GLUCOMTR-MCNC: 127 MG/DL — HIGH (ref 70–99)
GLUCOSE BLDC GLUCOMTR-MCNC: 127 MG/DL — HIGH (ref 70–99)
GLUCOSE BLDC GLUCOMTR-MCNC: 128 MG/DL — HIGH (ref 70–99)
GLUCOSE BLDC GLUCOMTR-MCNC: 129 MG/DL — HIGH (ref 70–99)
GLUCOSE BLDC GLUCOMTR-MCNC: 131 MG/DL — HIGH (ref 70–99)
GLUCOSE BLDC GLUCOMTR-MCNC: 132 MG/DL — HIGH (ref 70–99)
GLUCOSE BLDC GLUCOMTR-MCNC: 132 MG/DL — HIGH (ref 70–99)
GLUCOSE BLDC GLUCOMTR-MCNC: 133 MG/DL — HIGH (ref 70–99)
GLUCOSE BLDC GLUCOMTR-MCNC: 133 MG/DL — HIGH (ref 70–99)
GLUCOSE BLDC GLUCOMTR-MCNC: 135 MG/DL — HIGH (ref 70–99)
GLUCOSE BLDC GLUCOMTR-MCNC: 137 MG/DL — HIGH (ref 70–99)
GLUCOSE BLDC GLUCOMTR-MCNC: 138 MG/DL — HIGH (ref 70–99)
GLUCOSE BLDC GLUCOMTR-MCNC: 140 MG/DL — HIGH (ref 70–99)
GLUCOSE BLDC GLUCOMTR-MCNC: 142 MG/DL — HIGH (ref 70–99)
GLUCOSE BLDC GLUCOMTR-MCNC: 144 MG/DL — HIGH (ref 70–99)
GLUCOSE BLDC GLUCOMTR-MCNC: 146 MG/DL — HIGH (ref 70–99)
GLUCOSE BLDC GLUCOMTR-MCNC: 147 MG/DL — HIGH (ref 70–99)
GLUCOSE BLDC GLUCOMTR-MCNC: 150 MG/DL — HIGH (ref 70–99)
GLUCOSE BLDC GLUCOMTR-MCNC: 152 MG/DL — HIGH (ref 70–99)
GLUCOSE BLDC GLUCOMTR-MCNC: 152 MG/DL — HIGH (ref 70–99)
GLUCOSE BLDC GLUCOMTR-MCNC: 154 MG/DL — HIGH (ref 70–99)
GLUCOSE BLDC GLUCOMTR-MCNC: 157 MG/DL — HIGH (ref 70–99)
GLUCOSE BLDC GLUCOMTR-MCNC: 158 MG/DL — HIGH (ref 70–99)
GLUCOSE BLDC GLUCOMTR-MCNC: 163 MG/DL — HIGH (ref 70–99)
GLUCOSE BLDC GLUCOMTR-MCNC: 172 MG/DL — HIGH (ref 70–99)
GLUCOSE BLDC GLUCOMTR-MCNC: 179 MG/DL — HIGH (ref 70–99)
GLUCOSE BLDC GLUCOMTR-MCNC: 206 MG/DL — HIGH (ref 70–99)
GLUCOSE BLDC GLUCOMTR-MCNC: 221 MG/DL — HIGH (ref 70–99)
GLUCOSE BLDC GLUCOMTR-MCNC: 227 MG/DL — HIGH (ref 70–99)
GLUCOSE BLDC GLUCOMTR-MCNC: 235 MG/DL — HIGH (ref 70–99)
GLUCOSE BLDC GLUCOMTR-MCNC: 89 MG/DL — SIGNIFICANT CHANGE UP (ref 70–99)
GLUCOSE BLDC GLUCOMTR-MCNC: 94 MG/DL — SIGNIFICANT CHANGE UP (ref 70–99)
GLUCOSE BLDC GLUCOMTR-MCNC: 96 MG/DL — SIGNIFICANT CHANGE UP (ref 70–99)
GLUCOSE BLDC GLUCOMTR-MCNC: 97 MG/DL — SIGNIFICANT CHANGE UP (ref 70–99)
GLUCOSE SERPL-MCNC: 101 MG/DL — HIGH (ref 70–99)
GLUCOSE SERPL-MCNC: 103 MG/DL — HIGH (ref 70–99)
GLUCOSE SERPL-MCNC: 105 MG/DL — HIGH (ref 70–99)
GLUCOSE SERPL-MCNC: 106 MG/DL — HIGH (ref 70–99)
GLUCOSE SERPL-MCNC: 107 MG/DL — HIGH (ref 70–99)
GLUCOSE SERPL-MCNC: 108 MG/DL — HIGH (ref 70–99)
GLUCOSE SERPL-MCNC: 109 MG/DL — HIGH (ref 70–99)
GLUCOSE SERPL-MCNC: 109 MG/DL — HIGH (ref 70–99)
GLUCOSE SERPL-MCNC: 110 MG/DL — HIGH (ref 70–99)
GLUCOSE SERPL-MCNC: 114 MG/DL — HIGH (ref 70–99)
GLUCOSE SERPL-MCNC: 116 MG/DL — HIGH (ref 70–99)
GLUCOSE SERPL-MCNC: 118 MG/DL — HIGH (ref 70–99)
GLUCOSE SERPL-MCNC: 120 MG/DL — HIGH (ref 70–99)
GLUCOSE SERPL-MCNC: 121 MG/DL — HIGH (ref 70–99)
GLUCOSE SERPL-MCNC: 122 MG/DL — HIGH (ref 70–99)
GLUCOSE SERPL-MCNC: 122 MG/DL — HIGH (ref 70–99)
GLUCOSE SERPL-MCNC: 123 MG/DL — HIGH (ref 70–99)
GLUCOSE SERPL-MCNC: 131 MG/DL — HIGH (ref 70–99)
GLUCOSE SERPL-MCNC: 132 MG/DL — HIGH (ref 70–99)
GLUCOSE SERPL-MCNC: 134 MG/DL — HIGH (ref 70–99)
GLUCOSE SERPL-MCNC: 142 MG/DL — HIGH (ref 70–99)
GLUCOSE SERPL-MCNC: 142 MG/DL — HIGH (ref 70–99)
GLUCOSE SERPL-MCNC: 148 MG/DL — HIGH (ref 70–99)
GLUCOSE SERPL-MCNC: 148 MG/DL — HIGH (ref 70–99)
GLUCOSE SERPL-MCNC: 150 MG/DL — HIGH (ref 70–99)
GLUCOSE SERPL-MCNC: 153 MG/DL — HIGH (ref 70–99)
GLUCOSE SERPL-MCNC: 160 MG/DL — HIGH (ref 70–99)
GLUCOSE SERPL-MCNC: 161 MG/DL — HIGH (ref 70–99)
GLUCOSE SERPL-MCNC: 162 MG/DL — HIGH (ref 70–99)
GLUCOSE SERPL-MCNC: 163 MG/DL — HIGH (ref 70–99)
GLUCOSE SERPL-MCNC: 163 MG/DL — HIGH (ref 70–99)
GLUCOSE SERPL-MCNC: 169 MG/DL — HIGH (ref 70–99)
GLUCOSE SERPL-MCNC: 172 MG/DL — HIGH (ref 70–99)
GLUCOSE SERPL-MCNC: 195 MG/DL — HIGH (ref 70–99)
GLUCOSE SERPL-MCNC: 238 MG/DL — HIGH (ref 70–99)
GLUCOSE SERPL-MCNC: 242 MG/DL — HIGH (ref 70–99)
GLUCOSE SERPL-MCNC: 389 MG/DL — HIGH (ref 70–99)
GLUCOSE SERPL-MCNC: 495 MG/DL — CRITICAL HIGH (ref 70–99)
GLUCOSE SERPL-MCNC: 70 MG/DL — SIGNIFICANT CHANGE UP (ref 70–99)
GLUCOSE SERPL-MCNC: 91 MG/DL — SIGNIFICANT CHANGE UP (ref 70–99)
GLUCOSE SERPL-MCNC: 95 MG/DL — SIGNIFICANT CHANGE UP (ref 70–99)
GLUCOSE SERPL-MCNC: 96 MG/DL — SIGNIFICANT CHANGE UP (ref 70–99)
GLUCOSE UR QL: 100 MG/DL
GLUCOSE UR QL: NEGATIVE MG/DL — SIGNIFICANT CHANGE UP
GRAM STN FLD: ABNORMAL
HAPTOGLOB SERPL-MCNC: 176 MG/DL — SIGNIFICANT CHANGE UP (ref 34–200)
HAPTOGLOB SERPL-MCNC: 220 MG/DL — HIGH (ref 34–200)
HBV CORE AB SER-ACNC: SIGNIFICANT CHANGE UP
HBV SURFACE AB SER-ACNC: <3 MIU/ML — LOW
HBV SURFACE AG SER-ACNC: SIGNIFICANT CHANGE UP
HCO3 BLDA-SCNC: 19 MMOL/L — LOW (ref 21–28)
HCO3 BLDA-SCNC: 19 MMOL/L — LOW (ref 21–28)
HCO3 BLDA-SCNC: 22 MMOL/L — SIGNIFICANT CHANGE UP (ref 21–28)
HCO3 BLDA-SCNC: 25 MMOL/L — SIGNIFICANT CHANGE UP (ref 21–28)
HCT VFR BLD CALC: 20.2 % — CRITICAL LOW (ref 39–50)
HCT VFR BLD CALC: 20.6 % — CRITICAL LOW (ref 39–50)
HCT VFR BLD CALC: 21 % — CRITICAL LOW (ref 39–50)
HCT VFR BLD CALC: 23.4 % — LOW (ref 39–50)
HCT VFR BLD CALC: 23.9 % — LOW (ref 39–50)
HCT VFR BLD CALC: 24 % — LOW (ref 39–50)
HCT VFR BLD CALC: 24.1 % — LOW (ref 39–50)
HCT VFR BLD CALC: 24.4 % — LOW (ref 39–50)
HCT VFR BLD CALC: 24.5 % — LOW (ref 39–50)
HCT VFR BLD CALC: 24.5 % — LOW (ref 39–50)
HCT VFR BLD CALC: 24.8 % — LOW (ref 39–50)
HCT VFR BLD CALC: 25 % — LOW (ref 39–50)
HCT VFR BLD CALC: 25.4 % — LOW (ref 39–50)
HCT VFR BLD CALC: 25.5 % — LOW (ref 39–50)
HCT VFR BLD CALC: 26.2 % — LOW (ref 39–50)
HCT VFR BLD CALC: 26.6 % — LOW (ref 39–50)
HCT VFR BLD CALC: 26.7 % — LOW (ref 39–50)
HCT VFR BLD CALC: 27.2 % — LOW (ref 39–50)
HCT VFR BLD CALC: 27.6 % — LOW (ref 39–50)
HCT VFR BLD CALC: 27.9 % — LOW (ref 39–50)
HCT VFR BLD CALC: 29.1 % — LOW (ref 39–50)
HCT VFR BLD CALC: 29.2 % — LOW (ref 39–50)
HCT VFR BLD CALC: 29.3 % — LOW (ref 39–50)
HCT VFR BLD CALC: 30.9 % — LOW (ref 39–50)
HCT VFR BLD CALC: 31.8 % — LOW (ref 39–50)
HCT VFR BLD CALC: 32.2 % — LOW (ref 39–50)
HCT VFR BLD CALC: 33.8 % — LOW (ref 39–50)
HCT VFR BLD CALC: 36.4 % — LOW (ref 39–50)
HCT VFR BLD CALC: 38.8 % — LOW (ref 39–50)
HCT VFR BLD CALC: 39.8 % — SIGNIFICANT CHANGE UP (ref 39–50)
HCT VFR BLD CALC: 40.2 % — SIGNIFICANT CHANGE UP (ref 39–50)
HCT VFR BLD CALC: 41.1 % — SIGNIFICANT CHANGE UP (ref 39–50)
HCT VFR BLD CALC: 42.9 % — SIGNIFICANT CHANGE UP (ref 39–50)
HCT VFR BLD CALC: 43.4 % — SIGNIFICANT CHANGE UP (ref 39–50)
HDLC SERPL-MCNC: 55 MG/DL — SIGNIFICANT CHANGE UP
HGB BLD-MCNC: 10.1 G/DL — LOW (ref 13–17)
HGB BLD-MCNC: 10.6 G/DL — LOW (ref 13–17)
HGB BLD-MCNC: 11.2 G/DL — LOW (ref 13–17)
HGB BLD-MCNC: 12.1 G/DL — LOW (ref 13–17)
HGB BLD-MCNC: 12.5 G/DL — LOW (ref 13–17)
HGB BLD-MCNC: 12.7 G/DL — LOW (ref 13–17)
HGB BLD-MCNC: 12.9 G/DL — LOW (ref 13–17)
HGB BLD-MCNC: 13.3 G/DL — SIGNIFICANT CHANGE UP (ref 13–17)
HGB BLD-MCNC: 13.4 G/DL — SIGNIFICANT CHANGE UP (ref 13–17)
HGB BLD-MCNC: 5.9 G/DL — CRITICAL LOW (ref 13–17)
HGB BLD-MCNC: 6.4 G/DL — CRITICAL LOW (ref 13–17)
HGB BLD-MCNC: 6.5 G/DL — CRITICAL LOW (ref 13–17)
HGB BLD-MCNC: 7.4 G/DL — LOW (ref 13–17)
HGB BLD-MCNC: 7.4 G/DL — LOW (ref 13–17)
HGB BLD-MCNC: 7.5 G/DL — LOW (ref 13–17)
HGB BLD-MCNC: 7.5 G/DL — LOW (ref 13–17)
HGB BLD-MCNC: 7.6 G/DL — LOW (ref 13–17)
HGB BLD-MCNC: 7.8 G/DL — LOW (ref 13–17)
HGB BLD-MCNC: 8 G/DL — LOW (ref 13–17)
HGB BLD-MCNC: 8 G/DL — LOW (ref 13–17)
HGB BLD-MCNC: 8.4 G/DL — LOW (ref 13–17)
HGB BLD-MCNC: 8.4 G/DL — LOW (ref 13–17)
HGB BLD-MCNC: 8.6 G/DL — LOW (ref 13–17)
HGB BLD-MCNC: 8.8 G/DL — LOW (ref 13–17)
HGB BLD-MCNC: 9 G/DL — LOW (ref 13–17)
HGB BLD-MCNC: 9.1 G/DL — LOW (ref 13–17)
HGB BLD-MCNC: 9.4 G/DL — LOW (ref 13–17)
HGB BLD-MCNC: 9.8 G/DL — LOW (ref 13–17)
HGB BLD-MCNC: 9.9 G/DL — LOW (ref 13–17)
HOROWITZ INDEX BLDA+IHG-RTO: 40 — SIGNIFICANT CHANGE UP
HSV 1/2 SOURCE: SIGNIFICANT CHANGE UP
HSV1 DNA BLD QL NAA+PROBE: SIGNIFICANT CHANGE UP
HSV2 DNA BLD QL NAA+PROBE: SIGNIFICANT CHANGE UP
HYALINE CASTS # UR AUTO: PRESENT
HYPOCHROMIA BLD QL: SLIGHT — SIGNIFICANT CHANGE UP
IMM GRANULOCYTES NFR BLD AUTO: 0.6 % — SIGNIFICANT CHANGE UP (ref 0–0.9)
IMM GRANULOCYTES NFR BLD AUTO: 0.8 % — SIGNIFICANT CHANGE UP (ref 0–0.9)
IMM GRANULOCYTES NFR BLD AUTO: 0.8 % — SIGNIFICANT CHANGE UP (ref 0–0.9)
IMM GRANULOCYTES NFR BLD AUTO: 0.9 % — SIGNIFICANT CHANGE UP (ref 0–0.9)
IMM GRANULOCYTES NFR BLD AUTO: 1 % — HIGH (ref 0–0.9)
IMM GRANULOCYTES NFR BLD AUTO: 1.2 % — HIGH (ref 0–0.9)
IMM GRANULOCYTES NFR BLD AUTO: 1.4 % — HIGH (ref 0–0.9)
IMM GRANULOCYTES NFR BLD AUTO: 1.5 % — HIGH (ref 0–0.9)
IMM GRANULOCYTES NFR BLD AUTO: 1.6 % — HIGH (ref 0–0.9)
IMM GRANULOCYTES NFR BLD AUTO: 1.7 % — HIGH (ref 0–0.9)
IMM GRANULOCYTES NFR BLD AUTO: 1.8 % — HIGH (ref 0–0.9)
IMM GRANULOCYTES NFR BLD AUTO: 1.9 % — HIGH (ref 0–0.9)
IMM GRANULOCYTES NFR BLD AUTO: 2 % — HIGH (ref 0–0.9)
IMM GRANULOCYTES NFR BLD AUTO: 2.4 % — HIGH (ref 0–0.9)
IMM GRANULOCYTES NFR BLD AUTO: 2.5 % — HIGH (ref 0–0.9)
IMM GRANULOCYTES NFR BLD AUTO: 2.8 % — HIGH (ref 0–0.9)
IMM GRANULOCYTES NFR BLD AUTO: 3.8 % — HIGH (ref 0–0.9)
INR BLD: 0.96 RATIO — SIGNIFICANT CHANGE UP (ref 0.85–1.16)
INR BLD: 0.96 RATIO — SIGNIFICANT CHANGE UP (ref 0.85–1.16)
INR BLD: 0.97 RATIO — SIGNIFICANT CHANGE UP (ref 0.85–1.16)
INR BLD: 0.97 RATIO — SIGNIFICANT CHANGE UP (ref 0.85–1.16)
INR BLD: 0.99 RATIO — SIGNIFICANT CHANGE UP (ref 0.85–1.16)
INR BLD: 1.03 RATIO — SIGNIFICANT CHANGE UP (ref 0.85–1.16)
INR BLD: 1.04 RATIO — SIGNIFICANT CHANGE UP (ref 0.85–1.16)
INR BLD: 1.06 RATIO — SIGNIFICANT CHANGE UP (ref 0.85–1.16)
INR BLD: 1.07 RATIO — SIGNIFICANT CHANGE UP (ref 0.85–1.16)
INR BLD: 1.12 RATIO — SIGNIFICANT CHANGE UP (ref 0.85–1.16)
INR BLD: 1.16 RATIO — SIGNIFICANT CHANGE UP (ref 0.85–1.16)
INR BLD: 1.39 RATIO — HIGH (ref 0.85–1.16)
INR BLD: 1.42 RATIO — HIGH (ref 0.85–1.16)
INR BLD: 1.46 RATIO — HIGH (ref 0.85–1.16)
INR BLD: 1.47 RATIO — HIGH (ref 0.85–1.16)
INR BLD: 1.7 RATIO — HIGH (ref 0.85–1.16)
INR BLD: 1.77 RATIO — HIGH (ref 0.85–1.16)
INR BLD: 1.81 RATIO — HIGH (ref 0.85–1.16)
INR BLD: 1.93 RATIO — HIGH (ref 0.85–1.16)
INR BLD: 2.15 RATIO — HIGH (ref 0.85–1.16)
JCPYV DNA SPEC QL NAA+PROBE: 162 COPIES/ML — HIGH
JCPYV DNA SPEC QL NAA+PROBE: DETECTED
KETONES UR-MCNC: ABNORMAL MG/DL
KETONES UR-MCNC: ABNORMAL MG/DL
LACTATE SERPL-SCNC: 1.1 MMOL/L — SIGNIFICANT CHANGE UP (ref 0.5–2)
LACTATE SERPL-SCNC: 1.1 MMOL/L — SIGNIFICANT CHANGE UP (ref 0.5–2)
LDH SERPL L TO P-CCNC: 1878 U/L — HIGH (ref 50–242)
LDH SERPL L TO P-CCNC: 2073 U/L — HIGH (ref 50–242)
LDH SERPL L TO P-CCNC: 2756 U/L — HIGH (ref 50–242)
LDH SERPL L TO P-CCNC: 2772 U/L — HIGH (ref 50–242)
LDH SERPL L TO P-CCNC: 322 U/L — HIGH (ref 50–242)
LDH SERPL L TO P-CCNC: 327 U/L — HIGH (ref 50–242)
LDH SERPL L TO P-CCNC: 480 U/L — HIGH (ref 50–242)
LEUKOCYTE ESTERASE UR-ACNC: ABNORMAL
LEUKOCYTE ESTERASE UR-ACNC: NEGATIVE — SIGNIFICANT CHANGE UP
LIDOCAIN IGE QN: 107 U/L — HIGH (ref 7–60)
LIDOCAIN IGE QN: 120 U/L — HIGH (ref 7–60)
LIDOCAIN IGE QN: 136 U/L — HIGH (ref 7–60)
LIDOCAIN IGE QN: 152 U/L — HIGH (ref 7–60)
LIPID PNL WITH DIRECT LDL SERPL: 25 MG/DL — SIGNIFICANT CHANGE UP
LYMPHOCYTES # BLD AUTO: 0 % — LOW (ref 13–44)
LYMPHOCYTES # BLD AUTO: 0 K/UL — LOW (ref 1–3.3)
LYMPHOCYTES # BLD AUTO: 0.14 K/UL — LOW (ref 1–3.3)
LYMPHOCYTES # BLD AUTO: 0.2 K/UL — LOW (ref 1–3.3)
LYMPHOCYTES # BLD AUTO: 0.21 K/UL — LOW (ref 1–3.3)
LYMPHOCYTES # BLD AUTO: 0.22 K/UL — LOW (ref 1–3.3)
LYMPHOCYTES # BLD AUTO: 0.23 K/UL — LOW (ref 1–3.3)
LYMPHOCYTES # BLD AUTO: 0.25 K/UL — LOW (ref 1–3.3)
LYMPHOCYTES # BLD AUTO: 0.28 K/UL — LOW (ref 1–3.3)
LYMPHOCYTES # BLD AUTO: 0.28 K/UL — LOW (ref 1–3.3)
LYMPHOCYTES # BLD AUTO: 0.29 K/UL — LOW (ref 1–3.3)
LYMPHOCYTES # BLD AUTO: 0.29 K/UL — LOW (ref 1–3.3)
LYMPHOCYTES # BLD AUTO: 0.3 K/UL — LOW (ref 1–3.3)
LYMPHOCYTES # BLD AUTO: 0.3 K/UL — LOW (ref 1–3.3)
LYMPHOCYTES # BLD AUTO: 0.32 K/UL — LOW (ref 1–3.3)
LYMPHOCYTES # BLD AUTO: 0.32 K/UL — LOW (ref 1–3.3)
LYMPHOCYTES # BLD AUTO: 0.33 K/UL — LOW (ref 1–3.3)
LYMPHOCYTES # BLD AUTO: 0.37 K/UL — LOW (ref 1–3.3)
LYMPHOCYTES # BLD AUTO: 0.4 % — LOW (ref 13–44)
LYMPHOCYTES # BLD AUTO: 0.41 K/UL — LOW (ref 1–3.3)
LYMPHOCYTES # BLD AUTO: 0.43 K/UL — LOW (ref 1–3.3)
LYMPHOCYTES # BLD AUTO: 0.43 K/UL — LOW (ref 1–3.3)
LYMPHOCYTES # BLD AUTO: 0.46 K/UL — LOW (ref 1–3.3)
LYMPHOCYTES # BLD AUTO: 0.49 K/UL — LOW (ref 1–3.3)
LYMPHOCYTES # BLD AUTO: 0.58 K/UL — LOW (ref 1–3.3)
LYMPHOCYTES # BLD AUTO: 0.61 K/UL — LOW (ref 1–3.3)
LYMPHOCYTES # BLD AUTO: 0.68 K/UL — LOW (ref 1–3.3)
LYMPHOCYTES # BLD AUTO: 0.72 K/UL — LOW (ref 1–3.3)
LYMPHOCYTES # BLD AUTO: 0.9 % — LOW (ref 13–44)
LYMPHOCYTES # BLD AUTO: 0.9 % — LOW (ref 13–44)
LYMPHOCYTES # BLD AUTO: 0.93 K/UL — LOW (ref 1–3.3)
LYMPHOCYTES # BLD AUTO: 0.96 K/UL — LOW (ref 1–3.3)
LYMPHOCYTES # BLD AUTO: 1 % — LOW (ref 13–44)
LYMPHOCYTES # BLD AUTO: 1.2 % — LOW (ref 13–44)
LYMPHOCYTES # BLD AUTO: 1.5 % — LOW (ref 13–44)
LYMPHOCYTES # BLD AUTO: 1.7 % — LOW (ref 13–44)
LYMPHOCYTES # BLD AUTO: 1.7 % — LOW (ref 13–44)
LYMPHOCYTES # BLD AUTO: 1.8 % — LOW (ref 13–44)
LYMPHOCYTES # BLD AUTO: 1.9 % — LOW (ref 13–44)
LYMPHOCYTES # BLD AUTO: 2 % — LOW (ref 13–44)
LYMPHOCYTES # BLD AUTO: 2.1 % — LOW (ref 13–44)
LYMPHOCYTES # BLD AUTO: 2.4 % — LOW (ref 13–44)
LYMPHOCYTES # BLD AUTO: 2.5 % — LOW (ref 13–44)
LYMPHOCYTES # BLD AUTO: 2.6 % — LOW (ref 13–44)
LYMPHOCYTES # BLD AUTO: 2.6 % — LOW (ref 13–44)
LYMPHOCYTES # BLD AUTO: 2.7 % — LOW (ref 13–44)
LYMPHOCYTES # BLD AUTO: 2.7 % — LOW (ref 13–44)
LYMPHOCYTES # BLD AUTO: 2.8 % — LOW (ref 13–44)
LYMPHOCYTES # BLD AUTO: 2.9 % — LOW (ref 13–44)
LYMPHOCYTES # BLD AUTO: 3.5 % — LOW (ref 13–44)
LYMPHOCYTES # BLD AUTO: 4 % — LOW (ref 13–44)
LYMPHOCYTES # BLD AUTO: 4.5 % — LOW (ref 13–44)
LYMPHOCYTES # BLD AUTO: 5.3 % — LOW (ref 13–44)
LYMPHOCYTES # BLD AUTO: 7.9 % — LOW (ref 13–44)
MACROCYTES BLD QL: SLIGHT — SIGNIFICANT CHANGE UP
MAGNESIUM SERPL-MCNC: 1.6 MG/DL — SIGNIFICANT CHANGE UP (ref 1.6–2.6)
MAGNESIUM SERPL-MCNC: 1.9 MG/DL — SIGNIFICANT CHANGE UP (ref 1.6–2.6)
MAGNESIUM SERPL-MCNC: 2.2 MG/DL — SIGNIFICANT CHANGE UP (ref 1.6–2.6)
MAGNESIUM SERPL-MCNC: 2.3 MG/DL — SIGNIFICANT CHANGE UP (ref 1.6–2.6)
MAGNESIUM SERPL-MCNC: 2.4 MG/DL — SIGNIFICANT CHANGE UP (ref 1.6–2.6)
MAGNESIUM SERPL-MCNC: 2.5 MG/DL — SIGNIFICANT CHANGE UP (ref 1.6–2.6)
MAGNESIUM SERPL-MCNC: 2.6 MG/DL — SIGNIFICANT CHANGE UP (ref 1.6–2.6)
MAGNESIUM SERPL-MCNC: 2.7 MG/DL — HIGH (ref 1.6–2.6)
MAGNESIUM SERPL-MCNC: 2.8 MG/DL — HIGH (ref 1.6–2.6)
MAGNESIUM SERPL-MCNC: 2.9 MG/DL — HIGH (ref 1.6–2.6)
MANUAL SMEAR VERIFICATION: SIGNIFICANT CHANGE UP
MCHC RBC-ENTMCNC: 29.2 G/DL — LOW (ref 32–36)
MCHC RBC-ENTMCNC: 30.4 PG — SIGNIFICANT CHANGE UP (ref 27–34)
MCHC RBC-ENTMCNC: 30.6 PG — SIGNIFICANT CHANGE UP (ref 27–34)
MCHC RBC-ENTMCNC: 30.7 G/DL — LOW (ref 32–36)
MCHC RBC-ENTMCNC: 30.8 G/DL — LOW (ref 32–36)
MCHC RBC-ENTMCNC: 30.8 PG — SIGNIFICANT CHANGE UP (ref 27–34)
MCHC RBC-ENTMCNC: 30.8 PG — SIGNIFICANT CHANGE UP (ref 27–34)
MCHC RBC-ENTMCNC: 30.9 G/DL — LOW (ref 32–36)
MCHC RBC-ENTMCNC: 30.9 G/DL — LOW (ref 32–36)
MCHC RBC-ENTMCNC: 30.9 PG — SIGNIFICANT CHANGE UP (ref 27–34)
MCHC RBC-ENTMCNC: 31 G/DL — LOW (ref 32–36)
MCHC RBC-ENTMCNC: 31 G/DL — LOW (ref 32–36)
MCHC RBC-ENTMCNC: 31 PG — SIGNIFICANT CHANGE UP (ref 27–34)
MCHC RBC-ENTMCNC: 31.1 G/DL — LOW (ref 32–36)
MCHC RBC-ENTMCNC: 31.1 PG — SIGNIFICANT CHANGE UP (ref 27–34)
MCHC RBC-ENTMCNC: 31.2 G/DL — LOW (ref 32–36)
MCHC RBC-ENTMCNC: 31.2 PG — SIGNIFICANT CHANGE UP (ref 27–34)
MCHC RBC-ENTMCNC: 31.3 G/DL — LOW (ref 32–36)
MCHC RBC-ENTMCNC: 31.3 PG — SIGNIFICANT CHANGE UP (ref 27–34)
MCHC RBC-ENTMCNC: 31.4 G/DL — LOW (ref 32–36)
MCHC RBC-ENTMCNC: 31.4 PG — SIGNIFICANT CHANGE UP (ref 27–34)
MCHC RBC-ENTMCNC: 31.5 G/DL — LOW (ref 32–36)
MCHC RBC-ENTMCNC: 31.5 PG — SIGNIFICANT CHANGE UP (ref 27–34)
MCHC RBC-ENTMCNC: 31.6 G/DL — LOW (ref 32–36)
MCHC RBC-ENTMCNC: 31.6 PG — SIGNIFICANT CHANGE UP (ref 27–34)
MCHC RBC-ENTMCNC: 31.7 G/DL — LOW (ref 32–36)
MCHC RBC-ENTMCNC: 31.7 PG — SIGNIFICANT CHANGE UP (ref 27–34)
MCHC RBC-ENTMCNC: 31.8 G/DL — LOW (ref 32–36)
MCHC RBC-ENTMCNC: 31.8 G/DL — LOW (ref 32–36)
MCHC RBC-ENTMCNC: 31.8 PG — SIGNIFICANT CHANGE UP (ref 27–34)
MCHC RBC-ENTMCNC: 31.8 PG — SIGNIFICANT CHANGE UP (ref 27–34)
MCHC RBC-ENTMCNC: 31.9 G/DL — LOW (ref 32–36)
MCHC RBC-ENTMCNC: 31.9 G/DL — LOW (ref 32–36)
MCHC RBC-ENTMCNC: 31.9 PG — SIGNIFICANT CHANGE UP (ref 27–34)
MCHC RBC-ENTMCNC: 31.9 PG — SIGNIFICANT CHANGE UP (ref 27–34)
MCHC RBC-ENTMCNC: 32 PG — SIGNIFICANT CHANGE UP (ref 27–34)
MCHC RBC-ENTMCNC: 32.1 G/DL — SIGNIFICANT CHANGE UP (ref 32–36)
MCHC RBC-ENTMCNC: 32.1 G/DL — SIGNIFICANT CHANGE UP (ref 32–36)
MCHC RBC-ENTMCNC: 33 G/DL — SIGNIFICANT CHANGE UP (ref 32–36)
MCV RBC AUTO: 100.4 FL — HIGH (ref 80–100)
MCV RBC AUTO: 100.5 FL — HIGH (ref 80–100)
MCV RBC AUTO: 100.6 FL — HIGH (ref 80–100)
MCV RBC AUTO: 100.8 FL — HIGH (ref 80–100)
MCV RBC AUTO: 100.9 FL — HIGH (ref 80–100)
MCV RBC AUTO: 101 FL — HIGH (ref 80–100)
MCV RBC AUTO: 101 FL — HIGH (ref 80–100)
MCV RBC AUTO: 101.2 FL — HIGH (ref 80–100)
MCV RBC AUTO: 101.3 FL — HIGH (ref 80–100)
MCV RBC AUTO: 101.3 FL — HIGH (ref 80–100)
MCV RBC AUTO: 101.5 FL — HIGH (ref 80–100)
MCV RBC AUTO: 101.7 FL — HIGH (ref 80–100)
MCV RBC AUTO: 101.9 FL — HIGH (ref 80–100)
MCV RBC AUTO: 102.9 FL — HIGH (ref 80–100)
MCV RBC AUTO: 105.8 FL — HIGH (ref 80–100)
MCV RBC AUTO: 92.4 FL — SIGNIFICANT CHANGE UP (ref 80–100)
MCV RBC AUTO: 96.8 FL — SIGNIFICANT CHANGE UP (ref 80–100)
MCV RBC AUTO: 97.4 FL — SIGNIFICANT CHANGE UP (ref 80–100)
MCV RBC AUTO: 97.5 FL — SIGNIFICANT CHANGE UP (ref 80–100)
MCV RBC AUTO: 97.6 FL — SIGNIFICANT CHANGE UP (ref 80–100)
MCV RBC AUTO: 98 FL — SIGNIFICANT CHANGE UP (ref 80–100)
MCV RBC AUTO: 98 FL — SIGNIFICANT CHANGE UP (ref 80–100)
MCV RBC AUTO: 98.1 FL — SIGNIFICANT CHANGE UP (ref 80–100)
MCV RBC AUTO: 98.3 FL — SIGNIFICANT CHANGE UP (ref 80–100)
MCV RBC AUTO: 98.4 FL — SIGNIFICANT CHANGE UP (ref 80–100)
MCV RBC AUTO: 98.5 FL — SIGNIFICANT CHANGE UP (ref 80–100)
MCV RBC AUTO: 98.5 FL — SIGNIFICANT CHANGE UP (ref 80–100)
MCV RBC AUTO: 98.9 FL — SIGNIFICANT CHANGE UP (ref 80–100)
MCV RBC AUTO: 99.2 FL — SIGNIFICANT CHANGE UP (ref 80–100)
MCV RBC AUTO: 99.2 FL — SIGNIFICANT CHANGE UP (ref 80–100)
MCV RBC AUTO: 99.3 FL — SIGNIFICANT CHANGE UP (ref 80–100)
MCV RBC AUTO: 99.3 FL — SIGNIFICANT CHANGE UP (ref 80–100)
MCV RBC AUTO: 99.5 FL — SIGNIFICANT CHANGE UP (ref 80–100)
MCV RBC AUTO: 99.6 FL — SIGNIFICANT CHANGE UP (ref 80–100)
METHOD TYPE: SIGNIFICANT CHANGE UP
METHOD TYPE: SIGNIFICANT CHANGE UP
MICROCYTES BLD QL: SLIGHT — SIGNIFICANT CHANGE UP
MONOCYTES # BLD AUTO: 0.32 K/UL — SIGNIFICANT CHANGE UP (ref 0–0.9)
MONOCYTES # BLD AUTO: 0.43 K/UL — SIGNIFICANT CHANGE UP (ref 0–0.9)
MONOCYTES # BLD AUTO: 0.54 K/UL — SIGNIFICANT CHANGE UP (ref 0–0.9)
MONOCYTES # BLD AUTO: 0.62 K/UL — SIGNIFICANT CHANGE UP (ref 0–0.9)
MONOCYTES # BLD AUTO: 0.67 K/UL — SIGNIFICANT CHANGE UP (ref 0–0.9)
MONOCYTES # BLD AUTO: 0.69 K/UL — SIGNIFICANT CHANGE UP (ref 0–0.9)
MONOCYTES # BLD AUTO: 0.69 K/UL — SIGNIFICANT CHANGE UP (ref 0–0.9)
MONOCYTES # BLD AUTO: 0.74 K/UL — SIGNIFICANT CHANGE UP (ref 0–0.9)
MONOCYTES # BLD AUTO: 0.74 K/UL — SIGNIFICANT CHANGE UP (ref 0–0.9)
MONOCYTES # BLD AUTO: 0.79 K/UL — SIGNIFICANT CHANGE UP (ref 0–0.9)
MONOCYTES # BLD AUTO: 0.82 K/UL — SIGNIFICANT CHANGE UP (ref 0–0.9)
MONOCYTES # BLD AUTO: 0.82 K/UL — SIGNIFICANT CHANGE UP (ref 0–0.9)
MONOCYTES # BLD AUTO: 0.85 K/UL — SIGNIFICANT CHANGE UP (ref 0–0.9)
MONOCYTES # BLD AUTO: 0.85 K/UL — SIGNIFICANT CHANGE UP (ref 0–0.9)
MONOCYTES # BLD AUTO: 0.86 K/UL — SIGNIFICANT CHANGE UP (ref 0–0.9)
MONOCYTES # BLD AUTO: 0.92 K/UL — HIGH (ref 0–0.9)
MONOCYTES # BLD AUTO: 0.95 K/UL — HIGH (ref 0–0.9)
MONOCYTES # BLD AUTO: 0.96 K/UL — HIGH (ref 0–0.9)
MONOCYTES # BLD AUTO: 1.04 K/UL — HIGH (ref 0–0.9)
MONOCYTES # BLD AUTO: 1.04 K/UL — HIGH (ref 0–0.9)
MONOCYTES # BLD AUTO: 1.06 K/UL — HIGH (ref 0–0.9)
MONOCYTES # BLD AUTO: 1.09 K/UL — HIGH (ref 0–0.9)
MONOCYTES # BLD AUTO: 1.24 K/UL — HIGH (ref 0–0.9)
MONOCYTES # BLD AUTO: 1.27 K/UL — HIGH (ref 0–0.9)
MONOCYTES # BLD AUTO: 1.34 K/UL — HIGH (ref 0–0.9)
MONOCYTES # BLD AUTO: 1.38 K/UL — HIGH (ref 0–0.9)
MONOCYTES # BLD AUTO: 1.48 K/UL — HIGH (ref 0–0.9)
MONOCYTES # BLD AUTO: 1.58 K/UL — HIGH (ref 0–0.9)
MONOCYTES # BLD AUTO: 1.61 K/UL — HIGH (ref 0–0.9)
MONOCYTES # BLD AUTO: 1.81 K/UL — HIGH (ref 0–0.9)
MONOCYTES # BLD AUTO: 1.88 K/UL — HIGH (ref 0–0.9)
MONOCYTES # BLD AUTO: 2.34 K/UL — HIGH (ref 0–0.9)
MONOCYTES NFR BLD AUTO: 1.5 % — LOW (ref 2–14)
MONOCYTES NFR BLD AUTO: 1.7 % — LOW (ref 2–14)
MONOCYTES NFR BLD AUTO: 10.1 % — SIGNIFICANT CHANGE UP (ref 2–14)
MONOCYTES NFR BLD AUTO: 10.6 % — SIGNIFICANT CHANGE UP (ref 2–14)
MONOCYTES NFR BLD AUTO: 10.6 % — SIGNIFICANT CHANGE UP (ref 2–14)
MONOCYTES NFR BLD AUTO: 11.4 % — SIGNIFICANT CHANGE UP (ref 2–14)
MONOCYTES NFR BLD AUTO: 11.9 % — SIGNIFICANT CHANGE UP (ref 2–14)
MONOCYTES NFR BLD AUTO: 12.3 % — SIGNIFICANT CHANGE UP (ref 2–14)
MONOCYTES NFR BLD AUTO: 12.7 % — SIGNIFICANT CHANGE UP (ref 2–14)
MONOCYTES NFR BLD AUTO: 2.3 % — SIGNIFICANT CHANGE UP (ref 2–14)
MONOCYTES NFR BLD AUTO: 2.6 % — SIGNIFICANT CHANGE UP (ref 2–14)
MONOCYTES NFR BLD AUTO: 2.8 % — SIGNIFICANT CHANGE UP (ref 2–14)
MONOCYTES NFR BLD AUTO: 2.9 % — SIGNIFICANT CHANGE UP (ref 2–14)
MONOCYTES NFR BLD AUTO: 3.5 % — SIGNIFICANT CHANGE UP (ref 2–14)
MONOCYTES NFR BLD AUTO: 3.7 % — SIGNIFICANT CHANGE UP (ref 2–14)
MONOCYTES NFR BLD AUTO: 4.2 % — SIGNIFICANT CHANGE UP (ref 2–14)
MONOCYTES NFR BLD AUTO: 4.2 % — SIGNIFICANT CHANGE UP (ref 2–14)
MONOCYTES NFR BLD AUTO: 4.3 % — SIGNIFICANT CHANGE UP (ref 2–14)
MONOCYTES NFR BLD AUTO: 5.2 % — SIGNIFICANT CHANGE UP (ref 2–14)
MONOCYTES NFR BLD AUTO: 5.3 % — SIGNIFICANT CHANGE UP (ref 2–14)
MONOCYTES NFR BLD AUTO: 5.4 % — SIGNIFICANT CHANGE UP (ref 2–14)
MONOCYTES NFR BLD AUTO: 5.6 % — SIGNIFICANT CHANGE UP (ref 2–14)
MONOCYTES NFR BLD AUTO: 5.8 % — SIGNIFICANT CHANGE UP (ref 2–14)
MONOCYTES NFR BLD AUTO: 5.8 % — SIGNIFICANT CHANGE UP (ref 2–14)
MONOCYTES NFR BLD AUTO: 7.2 % — SIGNIFICANT CHANGE UP (ref 2–14)
MONOCYTES NFR BLD AUTO: 8.5 % — SIGNIFICANT CHANGE UP (ref 2–14)
MONOCYTES NFR BLD AUTO: 8.6 % — SIGNIFICANT CHANGE UP (ref 2–14)
MONOCYTES NFR BLD AUTO: 8.7 % — SIGNIFICANT CHANGE UP (ref 2–14)
MONOCYTES NFR BLD AUTO: 9.6 % — SIGNIFICANT CHANGE UP (ref 2–14)
MRSA PCR RESULT.: SIGNIFICANT CHANGE UP
NEUTROPHILS # BLD AUTO: 10.03 K/UL — HIGH (ref 1.8–7.4)
NEUTROPHILS # BLD AUTO: 10.35 K/UL — HIGH (ref 1.8–7.4)
NEUTROPHILS # BLD AUTO: 10.79 K/UL — HIGH (ref 1.8–7.4)
NEUTROPHILS # BLD AUTO: 11.7 K/UL — HIGH (ref 1.8–7.4)
NEUTROPHILS # BLD AUTO: 12.79 K/UL — HIGH (ref 1.8–7.4)
NEUTROPHILS # BLD AUTO: 13.13 K/UL — HIGH (ref 1.8–7.4)
NEUTROPHILS # BLD AUTO: 13.67 K/UL — HIGH (ref 1.8–7.4)
NEUTROPHILS # BLD AUTO: 15.14 K/UL — HIGH (ref 1.8–7.4)
NEUTROPHILS # BLD AUTO: 15.35 K/UL — HIGH (ref 1.8–7.4)
NEUTROPHILS # BLD AUTO: 15.35 K/UL — HIGH (ref 1.8–7.4)
NEUTROPHILS # BLD AUTO: 15.54 K/UL — HIGH (ref 1.8–7.4)
NEUTROPHILS # BLD AUTO: 15.6 K/UL — HIGH (ref 1.8–7.4)
NEUTROPHILS # BLD AUTO: 16.87 K/UL — HIGH (ref 1.8–7.4)
NEUTROPHILS # BLD AUTO: 18.27 K/UL — HIGH (ref 1.8–7.4)
NEUTROPHILS # BLD AUTO: 18.61 K/UL — HIGH (ref 1.8–7.4)
NEUTROPHILS # BLD AUTO: 18.63 K/UL — HIGH (ref 1.8–7.4)
NEUTROPHILS # BLD AUTO: 19.18 K/UL — HIGH (ref 1.8–7.4)
NEUTROPHILS # BLD AUTO: 23.93 K/UL — HIGH (ref 1.8–7.4)
NEUTROPHILS # BLD AUTO: 28.18 K/UL — HIGH (ref 1.8–7.4)
NEUTROPHILS # BLD AUTO: 31.39 K/UL — HIGH (ref 1.8–7.4)
NEUTROPHILS # BLD AUTO: 31.52 K/UL — HIGH (ref 1.8–7.4)
NEUTROPHILS # BLD AUTO: 32.97 K/UL — HIGH (ref 1.8–7.4)
NEUTROPHILS # BLD AUTO: 35.3 K/UL — HIGH (ref 1.8–7.4)
NEUTROPHILS # BLD AUTO: 38.33 K/UL — HIGH (ref 1.8–7.4)
NEUTROPHILS # BLD AUTO: 42.61 K/UL — HIGH (ref 1.8–7.4)
NEUTROPHILS # BLD AUTO: 43.46 K/UL — HIGH (ref 1.8–7.4)
NEUTROPHILS # BLD AUTO: 6.26 K/UL — SIGNIFICANT CHANGE UP (ref 1.8–7.4)
NEUTROPHILS # BLD AUTO: 8.92 K/UL — HIGH (ref 1.8–7.4)
NEUTROPHILS # BLD AUTO: 9.49 K/UL — HIGH (ref 1.8–7.4)
NEUTROPHILS # BLD AUTO: 9.56 K/UL — HIGH (ref 1.8–7.4)
NEUTROPHILS # BLD AUTO: 9.63 K/UL — HIGH (ref 1.8–7.4)
NEUTROPHILS # BLD AUTO: 9.94 K/UL — HIGH (ref 1.8–7.4)
NEUTROPHILS NFR BLD AUTO: 81.8 % — HIGH (ref 43–77)
NEUTROPHILS NFR BLD AUTO: 82.7 % — HIGH (ref 43–77)
NEUTROPHILS NFR BLD AUTO: 82.8 % — HIGH (ref 43–77)
NEUTROPHILS NFR BLD AUTO: 84.1 % — HIGH (ref 43–77)
NEUTROPHILS NFR BLD AUTO: 84.4 % — HIGH (ref 43–77)
NEUTROPHILS NFR BLD AUTO: 85.1 % — HIGH (ref 43–77)
NEUTROPHILS NFR BLD AUTO: 86.1 % — HIGH (ref 43–77)
NEUTROPHILS NFR BLD AUTO: 86.2 % — HIGH (ref 43–77)
NEUTROPHILS NFR BLD AUTO: 86.8 % — HIGH (ref 43–77)
NEUTROPHILS NFR BLD AUTO: 87.3 % — HIGH (ref 43–77)
NEUTROPHILS NFR BLD AUTO: 87.4 % — HIGH (ref 43–77)
NEUTROPHILS NFR BLD AUTO: 88.6 % — HIGH (ref 43–77)
NEUTROPHILS NFR BLD AUTO: 89.7 % — HIGH (ref 43–77)
NEUTROPHILS NFR BLD AUTO: 90.3 % — HIGH (ref 43–77)
NEUTROPHILS NFR BLD AUTO: 90.6 % — HIGH (ref 43–77)
NEUTROPHILS NFR BLD AUTO: 90.9 % — HIGH (ref 43–77)
NEUTROPHILS NFR BLD AUTO: 91.5 % — HIGH (ref 43–77)
NEUTROPHILS NFR BLD AUTO: 92.2 % — HIGH (ref 43–77)
NEUTROPHILS NFR BLD AUTO: 92.4 % — HIGH (ref 43–77)
NEUTROPHILS NFR BLD AUTO: 92.4 % — HIGH (ref 43–77)
NEUTROPHILS NFR BLD AUTO: 92.9 % — HIGH (ref 43–77)
NEUTROPHILS NFR BLD AUTO: 93 % — HIGH (ref 43–77)
NEUTROPHILS NFR BLD AUTO: 93.1 % — HIGH (ref 43–77)
NEUTROPHILS NFR BLD AUTO: 93.2 % — HIGH (ref 43–77)
NEUTROPHILS NFR BLD AUTO: 94.2 % — HIGH (ref 43–77)
NEUTROPHILS NFR BLD AUTO: 94.6 % — HIGH (ref 43–77)
NEUTROPHILS NFR BLD AUTO: 94.8 % — HIGH (ref 43–77)
NEUTROPHILS NFR BLD AUTO: 95.6 % — HIGH (ref 43–77)
NEUTROPHILS NFR BLD AUTO: 96.5 % — HIGH (ref 43–77)
NEUTROPHILS NFR BLD AUTO: 98.3 % — HIGH (ref 43–77)
NEUTS BAND # BLD: 2.7 % — SIGNIFICANT CHANGE UP (ref 0–8)
NEUTS BAND # BLD: 3.5 % — SIGNIFICANT CHANGE UP (ref 0–8)
NEUTS BAND NFR BLD: 2.7 % — SIGNIFICANT CHANGE UP (ref 0–8)
NEUTS BAND NFR BLD: 3.5 % — SIGNIFICANT CHANGE UP (ref 0–8)
NITRITE UR-MCNC: NEGATIVE — SIGNIFICANT CHANGE UP
NITRITE UR-MCNC: POSITIVE
NON HDL CHOLESTEROL: 40 MG/DL — SIGNIFICANT CHANGE UP
NRBC # BLD: 1 /100 WBCS — HIGH (ref 0–0)
NRBC # BLD: 2 /100 WBCS — HIGH (ref 0–0)
NRBC # BLD: 3 /100 WBCS — HIGH (ref 0–0)
NRBC BLD AUTO-RTO: 0 /100 WBCS — SIGNIFICANT CHANGE UP (ref 0–0)
NRBC BLD AUTO-RTO: 2 /100 WBCS — HIGH (ref 0–0)
NRBC BLD AUTO-RTO: 3 /100 WBCS — HIGH (ref 0–0)
NRBC BLD-RTO: 1 /100 WBCS — HIGH (ref 0–0)
NRBC BLD-RTO: 2 /100 WBCS — HIGH (ref 0–0)
NRBC BLD-RTO: 3 /100 WBCS — HIGH (ref 0–0)
NT-PROBNP SERPL-SCNC: 1378 PG/ML — HIGH (ref 0–300)
ORGANISM # SPEC MICROSCOPIC CNT: ABNORMAL
OVALOCYTES BLD QL SMEAR: SLIGHT — SIGNIFICANT CHANGE UP
PAPPENHEIMER BOD BLD QL SMEAR: PRESENT — SIGNIFICANT CHANGE UP
PCO2 BLDA: 35 MMHG — SIGNIFICANT CHANGE UP (ref 35–48)
PCO2 BLDA: 44 MMHG — SIGNIFICANT CHANGE UP (ref 35–48)
PCO2 BLDA: 66 MMHG — HIGH (ref 35–48)
PCO2 BLDA: >127 MMHG — CRITICAL HIGH (ref 35–48)
PH BLDA: 7.12 — CRITICAL LOW (ref 7.35–7.45)
PH BLDA: 7.24 — LOW (ref 7.35–7.45)
PH BLDA: 7.34 — LOW (ref 7.35–7.45)
PH BLDA: <6.9 — CRITICAL LOW (ref 7.35–7.45)
PH UR: 5 — SIGNIFICANT CHANGE UP (ref 5–8)
PH UR: 5.5 — SIGNIFICANT CHANGE UP (ref 5–8)
PHOSPHATE SERPL-MCNC: 10.4 MG/DL — HIGH (ref 2.5–4.5)
PHOSPHATE SERPL-MCNC: 10.7 MG/DL — HIGH (ref 2.5–4.5)
PHOSPHATE SERPL-MCNC: 10.8 MG/DL — HIGH (ref 2.5–4.5)
PHOSPHATE SERPL-MCNC: 2.2 MG/DL — LOW (ref 2.5–4.5)
PHOSPHATE SERPL-MCNC: 2.3 MG/DL — LOW (ref 2.5–4.5)
PHOSPHATE SERPL-MCNC: 2.3 MG/DL — LOW (ref 2.5–4.5)
PHOSPHATE SERPL-MCNC: 2.4 MG/DL — LOW (ref 2.5–4.5)
PHOSPHATE SERPL-MCNC: 2.5 MG/DL — SIGNIFICANT CHANGE UP (ref 2.5–4.5)
PHOSPHATE SERPL-MCNC: 2.6 MG/DL — SIGNIFICANT CHANGE UP (ref 2.5–4.5)
PHOSPHATE SERPL-MCNC: 2.7 MG/DL — SIGNIFICANT CHANGE UP (ref 2.5–4.5)
PHOSPHATE SERPL-MCNC: 2.8 MG/DL — SIGNIFICANT CHANGE UP (ref 2.5–4.5)
PHOSPHATE SERPL-MCNC: 2.8 MG/DL — SIGNIFICANT CHANGE UP (ref 2.5–4.5)
PHOSPHATE SERPL-MCNC: 2.9 MG/DL — SIGNIFICANT CHANGE UP (ref 2.5–4.5)
PHOSPHATE SERPL-MCNC: 3 MG/DL — SIGNIFICANT CHANGE UP (ref 2.5–4.5)
PHOSPHATE SERPL-MCNC: 3.1 MG/DL — SIGNIFICANT CHANGE UP (ref 2.5–4.5)
PHOSPHATE SERPL-MCNC: 3.2 MG/DL — SIGNIFICANT CHANGE UP (ref 2.5–4.5)
PHOSPHATE SERPL-MCNC: 3.3 MG/DL — SIGNIFICANT CHANGE UP (ref 2.5–4.5)
PHOSPHATE SERPL-MCNC: 3.4 MG/DL — SIGNIFICANT CHANGE UP (ref 2.5–4.5)
PHOSPHATE SERPL-MCNC: 3.5 MG/DL — SIGNIFICANT CHANGE UP (ref 2.5–4.5)
PHOSPHATE SERPL-MCNC: 3.7 MG/DL — SIGNIFICANT CHANGE UP (ref 2.5–4.5)
PHOSPHATE SERPL-MCNC: 3.7 MG/DL — SIGNIFICANT CHANGE UP (ref 2.5–4.5)
PHOSPHATE SERPL-MCNC: 4.2 MG/DL — SIGNIFICANT CHANGE UP (ref 2.5–4.5)
PHOSPHATE SERPL-MCNC: 4.8 MG/DL — HIGH (ref 2.5–4.5)
PHOSPHATE SERPL-MCNC: 5.3 MG/DL — HIGH (ref 2.5–4.5)
PHOSPHATE SERPL-MCNC: 5.6 MG/DL — HIGH (ref 2.5–4.5)
PHOSPHATE SERPL-MCNC: 5.6 MG/DL — HIGH (ref 2.5–4.5)
PHOSPHATE SERPL-MCNC: 6 MG/DL — HIGH (ref 2.5–4.5)
PHOSPHATE SERPL-MCNC: 6.2 MG/DL — HIGH (ref 2.5–4.5)
PHOSPHATE SERPL-MCNC: 6.2 MG/DL — HIGH (ref 2.5–4.5)
PHOSPHATE SERPL-MCNC: 7 MG/DL — HIGH (ref 2.5–4.5)
PHOSPHATE SERPL-MCNC: 7 MG/DL — HIGH (ref 2.5–4.5)
PHOSPHATE SERPL-MCNC: 7.3 MG/DL — HIGH (ref 2.5–4.5)
PHOSPHATE SERPL-MCNC: 7.6 MG/DL — HIGH (ref 2.5–4.5)
PHOSPHATE SERPL-MCNC: 7.6 MG/DL — HIGH (ref 2.5–4.5)
PHOSPHATE SERPL-MCNC: 8.3 MG/DL — HIGH (ref 2.5–4.5)
PHOSPHATE SERPL-MCNC: 8.6 MG/DL — HIGH (ref 2.5–4.5)
PLAT MORPH BLD: ABNORMAL
PLAT MORPH BLD: ABNORMAL
PLAT MORPH BLD: NORMAL — SIGNIFICANT CHANGE UP
PLATELET # BLD AUTO: 116 K/UL — LOW (ref 150–400)
PLATELET # BLD AUTO: 131 K/UL — LOW (ref 150–400)
PLATELET # BLD AUTO: 146 K/UL — LOW (ref 150–400)
PLATELET # BLD AUTO: 150 K/UL — SIGNIFICANT CHANGE UP (ref 150–400)
PLATELET # BLD AUTO: 151 K/UL — SIGNIFICANT CHANGE UP (ref 150–400)
PLATELET # BLD AUTO: 152 K/UL — SIGNIFICANT CHANGE UP (ref 150–400)
PLATELET # BLD AUTO: 154 K/UL — SIGNIFICANT CHANGE UP (ref 150–400)
PLATELET # BLD AUTO: 155 K/UL — SIGNIFICANT CHANGE UP (ref 150–400)
PLATELET # BLD AUTO: 170 K/UL — SIGNIFICANT CHANGE UP (ref 150–400)
PLATELET # BLD AUTO: 171 K/UL — SIGNIFICANT CHANGE UP (ref 150–400)
PLATELET # BLD AUTO: 175 K/UL — SIGNIFICANT CHANGE UP (ref 150–400)
PLATELET # BLD AUTO: 176 K/UL — SIGNIFICANT CHANGE UP (ref 150–400)
PLATELET # BLD AUTO: 189 K/UL — SIGNIFICANT CHANGE UP (ref 150–400)
PLATELET # BLD AUTO: 190 K/UL — SIGNIFICANT CHANGE UP (ref 150–400)
PLATELET # BLD AUTO: 192 K/UL — SIGNIFICANT CHANGE UP (ref 150–400)
PLATELET # BLD AUTO: 193 K/UL — SIGNIFICANT CHANGE UP (ref 150–400)
PLATELET # BLD AUTO: 195 K/UL — SIGNIFICANT CHANGE UP (ref 150–400)
PLATELET # BLD AUTO: 201 K/UL — SIGNIFICANT CHANGE UP (ref 150–400)
PLATELET # BLD AUTO: 202 K/UL — SIGNIFICANT CHANGE UP (ref 150–400)
PLATELET # BLD AUTO: 205 K/UL — SIGNIFICANT CHANGE UP (ref 150–400)
PLATELET # BLD AUTO: 205 K/UL — SIGNIFICANT CHANGE UP (ref 150–400)
PLATELET # BLD AUTO: 206 K/UL — SIGNIFICANT CHANGE UP (ref 150–400)
PLATELET # BLD AUTO: 207 K/UL — SIGNIFICANT CHANGE UP (ref 150–400)
PLATELET # BLD AUTO: 209 K/UL — SIGNIFICANT CHANGE UP (ref 150–400)
PLATELET # BLD AUTO: 213 K/UL — SIGNIFICANT CHANGE UP (ref 150–400)
PLATELET # BLD AUTO: 225 K/UL — SIGNIFICANT CHANGE UP (ref 150–400)
PLATELET # BLD AUTO: 227 K/UL — SIGNIFICANT CHANGE UP (ref 150–400)
PLATELET # BLD AUTO: 231 K/UL — SIGNIFICANT CHANGE UP (ref 150–400)
PLATELET # BLD AUTO: 255 K/UL — SIGNIFICANT CHANGE UP (ref 150–400)
PLATELET # BLD AUTO: 259 K/UL — SIGNIFICANT CHANGE UP (ref 150–400)
PLATELET # BLD AUTO: 328 K/UL — SIGNIFICANT CHANGE UP (ref 150–400)
PLATELET # BLD AUTO: 379 K/UL — SIGNIFICANT CHANGE UP (ref 150–400)
PLATELET # BLD AUTO: 386 K/UL — SIGNIFICANT CHANGE UP (ref 150–400)
PLATELET # BLD AUTO: 418 K/UL — HIGH (ref 150–400)
PO2 BLDA: 108 MMHG — SIGNIFICANT CHANGE UP (ref 83–108)
PO2 BLDA: 117 MMHG — HIGH (ref 83–108)
PO2 BLDA: 21 MMHG — CRITICAL LOW (ref 83–108)
PO2 BLDA: 319 MMHG — HIGH (ref 83–108)
POIKILOCYTOSIS BLD QL AUTO: SIGNIFICANT CHANGE UP
POIKILOCYTOSIS BLD QL AUTO: SLIGHT — SIGNIFICANT CHANGE UP
POLYCHROMASIA BLD QL SMEAR: SLIGHT — SIGNIFICANT CHANGE UP
POTASSIUM SERPL-MCNC: 3.7 MMOL/L — SIGNIFICANT CHANGE UP (ref 3.5–5.3)
POTASSIUM SERPL-MCNC: 3.8 MMOL/L — SIGNIFICANT CHANGE UP (ref 3.5–5.3)
POTASSIUM SERPL-MCNC: 3.9 MMOL/L — SIGNIFICANT CHANGE UP (ref 3.5–5.3)
POTASSIUM SERPL-MCNC: 3.9 MMOL/L — SIGNIFICANT CHANGE UP (ref 3.5–5.3)
POTASSIUM SERPL-MCNC: 4 MMOL/L — SIGNIFICANT CHANGE UP (ref 3.5–5.3)
POTASSIUM SERPL-MCNC: 4.1 MMOL/L — SIGNIFICANT CHANGE UP (ref 3.5–5.3)
POTASSIUM SERPL-MCNC: 4.2 MMOL/L — SIGNIFICANT CHANGE UP (ref 3.5–5.3)
POTASSIUM SERPL-MCNC: 4.2 MMOL/L — SIGNIFICANT CHANGE UP (ref 3.5–5.3)
POTASSIUM SERPL-MCNC: 4.3 MMOL/L — SIGNIFICANT CHANGE UP (ref 3.5–5.3)
POTASSIUM SERPL-MCNC: 4.3 MMOL/L — SIGNIFICANT CHANGE UP (ref 3.5–5.3)
POTASSIUM SERPL-MCNC: 4.4 MMOL/L — SIGNIFICANT CHANGE UP (ref 3.5–5.3)
POTASSIUM SERPL-MCNC: 4.5 MMOL/L — SIGNIFICANT CHANGE UP (ref 3.5–5.3)
POTASSIUM SERPL-MCNC: 4.6 MMOL/L — SIGNIFICANT CHANGE UP (ref 3.5–5.3)
POTASSIUM SERPL-MCNC: 4.7 MMOL/L — SIGNIFICANT CHANGE UP (ref 3.5–5.3)
POTASSIUM SERPL-MCNC: 4.8 MMOL/L — SIGNIFICANT CHANGE UP (ref 3.5–5.3)
POTASSIUM SERPL-MCNC: 4.9 MMOL/L — SIGNIFICANT CHANGE UP (ref 3.5–5.3)
POTASSIUM SERPL-MCNC: 5 MMOL/L — SIGNIFICANT CHANGE UP (ref 3.5–5.3)
POTASSIUM SERPL-MCNC: 5.3 MMOL/L — SIGNIFICANT CHANGE UP (ref 3.5–5.3)
POTASSIUM SERPL-MCNC: 5.5 MMOL/L — HIGH (ref 3.5–5.3)
POTASSIUM SERPL-MCNC: 5.5 MMOL/L — HIGH (ref 3.5–5.3)
POTASSIUM SERPL-MCNC: 5.6 MMOL/L — HIGH (ref 3.5–5.3)
POTASSIUM SERPL-MCNC: 5.6 MMOL/L — HIGH (ref 3.5–5.3)
POTASSIUM SERPL-MCNC: 6.1 MMOL/L — HIGH (ref 3.5–5.3)
POTASSIUM SERPL-MCNC: 6.2 MMOL/L — CRITICAL HIGH (ref 3.5–5.3)
POTASSIUM SERPL-MCNC: 6.7 MMOL/L — CRITICAL HIGH (ref 3.5–5.3)
POTASSIUM SERPL-SCNC: 3.7 MMOL/L — SIGNIFICANT CHANGE UP (ref 3.5–5.3)
POTASSIUM SERPL-SCNC: 3.8 MMOL/L — SIGNIFICANT CHANGE UP (ref 3.5–5.3)
POTASSIUM SERPL-SCNC: 3.9 MMOL/L — SIGNIFICANT CHANGE UP (ref 3.5–5.3)
POTASSIUM SERPL-SCNC: 3.9 MMOL/L — SIGNIFICANT CHANGE UP (ref 3.5–5.3)
POTASSIUM SERPL-SCNC: 4 MMOL/L — SIGNIFICANT CHANGE UP (ref 3.5–5.3)
POTASSIUM SERPL-SCNC: 4.1 MMOL/L — SIGNIFICANT CHANGE UP (ref 3.5–5.3)
POTASSIUM SERPL-SCNC: 4.2 MMOL/L — SIGNIFICANT CHANGE UP (ref 3.5–5.3)
POTASSIUM SERPL-SCNC: 4.2 MMOL/L — SIGNIFICANT CHANGE UP (ref 3.5–5.3)
POTASSIUM SERPL-SCNC: 4.3 MMOL/L — SIGNIFICANT CHANGE UP (ref 3.5–5.3)
POTASSIUM SERPL-SCNC: 4.3 MMOL/L — SIGNIFICANT CHANGE UP (ref 3.5–5.3)
POTASSIUM SERPL-SCNC: 4.4 MMOL/L — SIGNIFICANT CHANGE UP (ref 3.5–5.3)
POTASSIUM SERPL-SCNC: 4.5 MMOL/L — SIGNIFICANT CHANGE UP (ref 3.5–5.3)
POTASSIUM SERPL-SCNC: 4.6 MMOL/L — SIGNIFICANT CHANGE UP (ref 3.5–5.3)
POTASSIUM SERPL-SCNC: 4.7 MMOL/L — SIGNIFICANT CHANGE UP (ref 3.5–5.3)
POTASSIUM SERPL-SCNC: 4.8 MMOL/L — SIGNIFICANT CHANGE UP (ref 3.5–5.3)
POTASSIUM SERPL-SCNC: 4.9 MMOL/L — SIGNIFICANT CHANGE UP (ref 3.5–5.3)
POTASSIUM SERPL-SCNC: 5 MMOL/L — SIGNIFICANT CHANGE UP (ref 3.5–5.3)
POTASSIUM SERPL-SCNC: 5.3 MMOL/L — SIGNIFICANT CHANGE UP (ref 3.5–5.3)
POTASSIUM SERPL-SCNC: 5.5 MMOL/L — HIGH (ref 3.5–5.3)
POTASSIUM SERPL-SCNC: 5.5 MMOL/L — HIGH (ref 3.5–5.3)
POTASSIUM SERPL-SCNC: 5.6 MMOL/L — HIGH (ref 3.5–5.3)
POTASSIUM SERPL-SCNC: 5.6 MMOL/L — HIGH (ref 3.5–5.3)
POTASSIUM SERPL-SCNC: 6.1 MMOL/L — HIGH (ref 3.5–5.3)
POTASSIUM SERPL-SCNC: 6.2 MMOL/L — CRITICAL HIGH (ref 3.5–5.3)
POTASSIUM SERPL-SCNC: 6.7 MMOL/L — CRITICAL HIGH (ref 3.5–5.3)
PROT SERPL-MCNC: 2.7 G/DL — LOW (ref 6–8.3)
PROT SERPL-MCNC: 3.4 G/DL — LOW (ref 6–8.3)
PROT SERPL-MCNC: 4.1 G/DL — LOW (ref 6–8.3)
PROT SERPL-MCNC: 4.1 G/DL — LOW (ref 6–8.3)
PROT SERPL-MCNC: 4.2 G/DL — LOW (ref 6–8.3)
PROT SERPL-MCNC: 4.3 G/DL — LOW (ref 6–8.3)
PROT SERPL-MCNC: 4.4 G/DL — LOW (ref 6–8.3)
PROT SERPL-MCNC: 4.4 G/DL — LOW (ref 6–8.3)
PROT SERPL-MCNC: 4.5 G/DL — LOW (ref 6–8.3)
PROT SERPL-MCNC: 4.6 G/DL — LOW (ref 6–8.3)
PROT SERPL-MCNC: 4.8 G/DL — LOW (ref 6–8.3)
PROT SERPL-MCNC: 4.9 G/DL — LOW (ref 6–8.3)
PROT SERPL-MCNC: 5.1 G/DL — LOW (ref 6–8.3)
PROT SERPL-MCNC: 5.2 G/DL — LOW (ref 6–8.3)
PROT SERPL-MCNC: 5.3 G/DL — LOW (ref 6–8.3)
PROT SERPL-MCNC: 5.4 G/DL — LOW (ref 6–8.3)
PROT SERPL-MCNC: 5.5 G/DL — LOW (ref 6–8.3)
PROT SERPL-MCNC: 5.6 G/DL — LOW (ref 6–8.3)
PROT SERPL-MCNC: 5.6 G/DL — LOW (ref 6–8.3)
PROT SERPL-MCNC: 5.7 G/DL — LOW (ref 6–8.3)
PROT SERPL-MCNC: 6.1 G/DL — SIGNIFICANT CHANGE UP (ref 6–8.3)
PROT SERPL-MCNC: 6.2 G/DL — SIGNIFICANT CHANGE UP (ref 6–8.3)
PROT SERPL-MCNC: 6.3 G/DL — SIGNIFICANT CHANGE UP (ref 6–8.3)
PROT SERPL-MCNC: 6.4 G/DL — SIGNIFICANT CHANGE UP (ref 6–8.3)
PROT SERPL-MCNC: 6.5 G/DL — SIGNIFICANT CHANGE UP (ref 6–8.3)
PROT UR-MCNC: 100 MG/DL
PROT UR-MCNC: NEGATIVE MG/DL — SIGNIFICANT CHANGE UP
PROTHROM AB SERPL-ACNC: 11 SEC — SIGNIFICANT CHANGE UP (ref 9.9–13.4)
PROTHROM AB SERPL-ACNC: 11.1 SEC — SIGNIFICANT CHANGE UP (ref 9.9–13.4)
PROTHROM AB SERPL-ACNC: 11.3 SEC — SIGNIFICANT CHANGE UP (ref 9.9–13.4)
PROTHROM AB SERPL-ACNC: 11.8 SEC — SIGNIFICANT CHANGE UP (ref 9.9–13.4)
PROTHROM AB SERPL-ACNC: 11.8 SEC — SIGNIFICANT CHANGE UP (ref 9.9–13.4)
PROTHROM AB SERPL-ACNC: 12.1 SEC — SIGNIFICANT CHANGE UP (ref 9.9–13.4)
PROTHROM AB SERPL-ACNC: 12.3 SEC — SIGNIFICANT CHANGE UP (ref 9.9–13.4)
PROTHROM AB SERPL-ACNC: 12.9 SEC — SIGNIFICANT CHANGE UP (ref 9.9–13.4)
PROTHROM AB SERPL-ACNC: 13.2 SEC — SIGNIFICANT CHANGE UP (ref 9.9–13.4)
PROTHROM AB SERPL-ACNC: 15.9 SEC — HIGH (ref 9.9–13.4)
PROTHROM AB SERPL-ACNC: 16.1 SEC — HIGH (ref 9.9–13.4)
PROTHROM AB SERPL-ACNC: 16.7 SEC — HIGH (ref 9.9–13.4)
PROTHROM AB SERPL-ACNC: 16.7 SEC — HIGH (ref 9.9–13.4)
PROTHROM AB SERPL-ACNC: 19.4 SEC — HIGH (ref 9.9–13.4)
PROTHROM AB SERPL-ACNC: 20.2 SEC — HIGH (ref 9.9–13.4)
PROTHROM AB SERPL-ACNC: 20.7 SEC — HIGH (ref 9.9–13.4)
PROTHROM AB SERPL-ACNC: 22.1 SEC — HIGH (ref 9.9–13.4)
PROTHROM AB SERPL-ACNC: 24.5 SEC — HIGH (ref 9.9–13.4)
PYRIDOXAL PHOS SERPL-MCNC: SIGNIFICANT CHANGE UP UG/L
RAPID RVP RESULT: SIGNIFICANT CHANGE UP
RBC # BLD: 1.91 M/UL — LOW (ref 4.2–5.8)
RBC # BLD: 2.03 M/UL — LOW (ref 4.2–5.8)
RBC # BLD: 2.03 M/UL — LOW (ref 4.2–5.8)
RBC # BLD: 2.06 M/UL — LOW (ref 4.2–5.8)
RBC # BLD: 2.33 M/UL — LOW (ref 4.2–5.8)
RBC # BLD: 2.37 M/UL — LOW (ref 4.2–5.8)
RBC # BLD: 2.38 M/UL — LOW (ref 4.2–5.8)
RBC # BLD: 2.45 M/UL — LOW (ref 4.2–5.8)
RBC # BLD: 2.48 M/UL — LOW (ref 4.2–5.8)
RBC # BLD: 2.5 M/UL — LOW (ref 4.2–5.8)
RBC # BLD: 2.5 M/UL — LOW (ref 4.2–5.8)
RBC # BLD: 2.52 M/UL — LOW (ref 4.2–5.8)
RBC # BLD: 2.53 M/UL — LOW (ref 4.2–5.8)
RBC # BLD: 2.55 M/UL — LOW (ref 4.2–5.8)
RBC # BLD: 2.6 M/UL — LOW (ref 4.2–5.8)
RBC # BLD: 2.69 M/UL — LOW (ref 4.2–5.8)
RBC # BLD: 2.7 M/UL — LOW (ref 4.2–5.8)
RBC # BLD: 2.75 M/UL — LOW (ref 4.2–5.8)
RBC # BLD: 2.79 M/UL — LOW (ref 4.2–5.8)
RBC # BLD: 2.8 M/UL — LOW (ref 4.2–5.8)
RBC # BLD: 2.83 M/UL — LOW (ref 4.2–5.8)
RBC # BLD: 2.88 M/UL — LOW (ref 4.2–5.8)
RBC # BLD: 2.89 M/UL — LOW (ref 4.2–5.8)
RBC # BLD: 2.94 M/UL — LOW (ref 4.2–5.8)
RBC # BLD: 2.95 M/UL — LOW (ref 4.2–5.8)
RBC # BLD: 3.06 M/UL — LOW (ref 4.2–5.8)
RBC # BLD: 3.16 M/UL — LOW (ref 4.2–5.8)
RBC # BLD: 3.27 M/UL — LOW (ref 4.2–5.8)
RBC # BLD: 3.35 M/UL — LOW (ref 4.2–5.8)
RBC # BLD: 3.58 M/UL — LOW (ref 4.2–5.8)
RBC # BLD: 3.91 M/UL — LOW (ref 4.2–5.8)
RBC # BLD: 4 M/UL — LOW (ref 4.2–5.8)
RBC # BLD: 4 M/UL — LOW (ref 4.2–5.8)
RBC # BLD: 4.17 M/UL — LOW (ref 4.2–5.8)
RBC # BLD: 4.18 M/UL — LOW (ref 4.2–5.8)
RBC # BLD: 4.29 M/UL — SIGNIFICANT CHANGE UP (ref 4.2–5.8)
RBC # FLD: 15.6 % — HIGH (ref 10.3–14.5)
RBC # FLD: 15.7 % — HIGH (ref 10.3–14.5)
RBC # FLD: 15.8 % — HIGH (ref 10.3–14.5)
RBC # FLD: 15.9 % — HIGH (ref 10.3–14.5)
RBC # FLD: 16 % — HIGH (ref 10.3–14.5)
RBC # FLD: 16.2 % — HIGH (ref 10.3–14.5)
RBC # FLD: 16.3 % — HIGH (ref 10.3–14.5)
RBC # FLD: 16.3 % — HIGH (ref 10.3–14.5)
RBC # FLD: 16.4 % — HIGH (ref 10.3–14.5)
RBC # FLD: 16.4 % — HIGH (ref 10.3–14.5)
RBC # FLD: 17.3 % — HIGH (ref 10.3–14.5)
RBC # FLD: 19.2 % — HIGH (ref 10.3–14.5)
RBC # FLD: 19.3 % — HIGH (ref 10.3–14.5)
RBC # FLD: 19.4 % — HIGH (ref 10.3–14.5)
RBC # FLD: 19.5 % — HIGH (ref 10.3–14.5)
RBC # FLD: 19.5 % — HIGH (ref 10.3–14.5)
RBC # FLD: 19.6 % — HIGH (ref 10.3–14.5)
RBC # FLD: 19.9 % — HIGH (ref 10.3–14.5)
RBC # FLD: 20.1 % — HIGH (ref 10.3–14.5)
RBC BLD AUTO: ABNORMAL
RBC BLD AUTO: SIGNIFICANT CHANGE UP
RBC BLD AUTO: SIGNIFICANT CHANGE UP
RBC CASTS # UR COMP ASSIST: 91 /HPF — HIGH (ref 0–4)
RETICS #: 48 K/UL — SIGNIFICANT CHANGE UP (ref 25–125)
RETICS #: 96 K/UL — SIGNIFICANT CHANGE UP (ref 25–125)
RETICS/RBC NFR: 1.7 % — SIGNIFICANT CHANGE UP (ref 0.5–2.5)
RETICS/RBC NFR: 4.7 % — HIGH (ref 0.5–2.5)
REVIEW: SIGNIFICANT CHANGE UP
RH IG SCN BLD-IMP: POSITIVE — SIGNIFICANT CHANGE UP
RSV RNA NPH QL NAA+NON-PROBE: SIGNIFICANT CHANGE UP
S AUREUS DNA NOSE QL NAA+PROBE: SIGNIFICANT CHANGE UP
SAO2 % BLDA: 100 % — HIGH (ref 94–98)
SAO2 % BLDA: 100 % — HIGH (ref 94–98)
SAO2 % BLDA: 13.3 % — LOW (ref 94–98)
SAO2 % BLDA: 98 % — SIGNIFICANT CHANGE UP (ref 94–98)
SARS-COV-2 RNA SPEC QL NAA+PROBE: SIGNIFICANT CHANGE UP
SARS-COV-2 RNA SPEC QL NAA+PROBE: SIGNIFICANT CHANGE UP
SCHISTOCYTES BLD QL AUTO: SLIGHT — SIGNIFICANT CHANGE UP
SODIUM SERPL-SCNC: 133 MMOL/L — LOW (ref 135–145)
SODIUM SERPL-SCNC: 134 MMOL/L — LOW (ref 135–145)
SODIUM SERPL-SCNC: 135 MMOL/L — SIGNIFICANT CHANGE UP (ref 135–145)
SODIUM SERPL-SCNC: 136 MMOL/L — SIGNIFICANT CHANGE UP (ref 135–145)
SODIUM SERPL-SCNC: 137 MMOL/L — SIGNIFICANT CHANGE UP (ref 135–145)
SODIUM SERPL-SCNC: 138 MMOL/L — SIGNIFICANT CHANGE UP (ref 135–145)
SODIUM SERPL-SCNC: 139 MMOL/L — SIGNIFICANT CHANGE UP (ref 135–145)
SODIUM SERPL-SCNC: 140 MMOL/L — SIGNIFICANT CHANGE UP (ref 135–145)
SODIUM SERPL-SCNC: 140 MMOL/L — SIGNIFICANT CHANGE UP (ref 135–145)
SODIUM SERPL-SCNC: 141 MMOL/L — SIGNIFICANT CHANGE UP (ref 135–145)
SODIUM SERPL-SCNC: 142 MMOL/L — SIGNIFICANT CHANGE UP (ref 135–145)
SODIUM SERPL-SCNC: 143 MMOL/L — SIGNIFICANT CHANGE UP (ref 135–145)
SODIUM SERPL-SCNC: 143 MMOL/L — SIGNIFICANT CHANGE UP (ref 135–145)
SP GR SPEC: 1.02 — SIGNIFICANT CHANGE UP (ref 1–1.03)
SP GR SPEC: >1.03 — HIGH (ref 1–1.03)
SPECIMEN SOURCE: SIGNIFICANT CHANGE UP
SQUAMOUS # UR AUTO: 5 /HPF — SIGNIFICANT CHANGE UP (ref 0–5)
T GONDII DNA BLD QL NAA+PROBE: SIGNIFICANT CHANGE UP COPIES/ML
T GONDII IGG SER QL: 10.7 IU/ML — HIGH
T GONDII IGG SER QL: POSITIVE
T GONDII IGM SER QL: <3 AU/ML — SIGNIFICANT CHANGE UP
T GONDII IGM SER QL: NEGATIVE — SIGNIFICANT CHANGE UP
T PALLIDUM AB TITR SER: NEGATIVE — SIGNIFICANT CHANGE UP
TACROLIMUS SERPL-MCNC: 10.5 NG/ML — SIGNIFICANT CHANGE UP
TACROLIMUS SERPL-MCNC: 11.4 NG/ML — SIGNIFICANT CHANGE UP
TACROLIMUS SERPL-MCNC: 2.5 NG/ML — SIGNIFICANT CHANGE UP
TACROLIMUS SERPL-MCNC: 2.6 NG/ML — SIGNIFICANT CHANGE UP
TACROLIMUS SERPL-MCNC: 2.6 NG/ML — SIGNIFICANT CHANGE UP
TACROLIMUS SERPL-MCNC: 2.8 NG/ML — SIGNIFICANT CHANGE UP
TACROLIMUS SERPL-MCNC: 2.9 NG/ML — SIGNIFICANT CHANGE UP
TACROLIMUS SERPL-MCNC: 3 NG/ML — SIGNIFICANT CHANGE UP
TACROLIMUS SERPL-MCNC: 3.8 NG/ML — SIGNIFICANT CHANGE UP
TACROLIMUS SERPL-MCNC: 5 NG/ML — SIGNIFICANT CHANGE UP
TACROLIMUS SERPL-MCNC: 5.7 NG/ML — SIGNIFICANT CHANGE UP
TACROLIMUS SERPL-MCNC: 6.9 NG/ML — SIGNIFICANT CHANGE UP
TACROLIMUS SERPL-MCNC: 7.2 NG/ML — SIGNIFICANT CHANGE UP
TACROLIMUS SERPL-MCNC: 7.4 NG/ML — SIGNIFICANT CHANGE UP
TACROLIMUS SERPL-MCNC: 7.5 NG/ML — SIGNIFICANT CHANGE UP
TACROLIMUS SERPL-MCNC: 8 NG/ML — SIGNIFICANT CHANGE UP
TACROLIMUS SERPL-MCNC: 8.7 NG/ML — SIGNIFICANT CHANGE UP
TARGETS BLD QL SMEAR: SLIGHT — SIGNIFICANT CHANGE UP
TRIGL SERPL-MCNC: 65 MG/DL — SIGNIFICANT CHANGE UP
TROPONIN T, HIGH SENSITIVITY RESULT: 43 NG/L — SIGNIFICANT CHANGE UP (ref 0–51)
TROPONIN T, HIGH SENSITIVITY RESULT: 59 NG/L — HIGH (ref 0–51)
URATE SERPL-MCNC: 2 MG/DL — LOW (ref 3.4–8.8)
UROBILINOGEN FLD QL: 1 MG/DL — SIGNIFICANT CHANGE UP (ref 0.2–1)
UROBILINOGEN FLD QL: 1 MG/DL — SIGNIFICANT CHANGE UP (ref 0.2–1)
VANCOMYCIN FLD-MCNC: 25.9 UG/ML
VANCOMYCIN TROUGH SERPL-MCNC: 10.3 UG/ML — SIGNIFICANT CHANGE UP (ref 10–20)
VANCOMYCIN TROUGH SERPL-MCNC: 23.7 UG/ML — HIGH (ref 10–20)
VIT B12 SERPL-MCNC: >2000 PG/ML — HIGH (ref 232–1245)
WBC # BLD: 10.29 K/UL — SIGNIFICANT CHANGE UP (ref 3.8–10.5)
WBC # BLD: 10.9 K/UL — HIGH (ref 3.8–10.5)
WBC # BLD: 10.98 K/UL — HIGH (ref 3.8–10.5)
WBC # BLD: 11.23 K/UL — HIGH (ref 3.8–10.5)
WBC # BLD: 11.28 K/UL — HIGH (ref 3.8–10.5)
WBC # BLD: 11.38 K/UL — HIGH (ref 3.8–10.5)
WBC # BLD: 11.64 K/UL — HIGH (ref 3.8–10.5)
WBC # BLD: 11.87 K/UL — HIGH (ref 3.8–10.5)
WBC # BLD: 11.93 K/UL — HIGH (ref 3.8–10.5)
WBC # BLD: 12.12 K/UL — HIGH (ref 3.8–10.5)
WBC # BLD: 12.24 K/UL — HIGH (ref 3.8–10.5)
WBC # BLD: 14.63 K/UL — HIGH (ref 3.8–10.5)
WBC # BLD: 15.15 K/UL — HIGH (ref 3.8–10.5)
WBC # BLD: 15.17 K/UL — HIGH (ref 3.8–10.5)
WBC # BLD: 16.39 K/UL — HIGH (ref 3.8–10.5)
WBC # BLD: 16.4 K/UL — HIGH (ref 3.8–10.5)
WBC # BLD: 16.49 K/UL — HIGH (ref 3.8–10.5)
WBC # BLD: 17.23 K/UL — HIGH (ref 3.8–10.5)
WBC # BLD: 17.85 K/UL — HIGH (ref 3.8–10.5)
WBC # BLD: 18.43 K/UL — HIGH (ref 3.8–10.5)
WBC # BLD: 19.67 K/UL — HIGH (ref 3.8–10.5)
WBC # BLD: 19.75 K/UL — HIGH (ref 3.8–10.5)
WBC # BLD: 19.97 K/UL — HIGH (ref 3.8–10.5)
WBC # BLD: 21.64 K/UL — HIGH (ref 3.8–10.5)
WBC # BLD: 25.26 K/UL — HIGH (ref 3.8–10.5)
WBC # BLD: 31.93 K/UL — HIGH (ref 3.8–10.5)
WBC # BLD: 32.3 K/UL — HIGH (ref 3.8–10.5)
WBC # BLD: 34.16 K/UL — HIGH (ref 3.8–10.5)
WBC # BLD: 35.01 K/UL — HIGH (ref 3.8–10.5)
WBC # BLD: 36.58 K/UL — HIGH (ref 3.8–10.5)
WBC # BLD: 40.09 K/UL — CRITICAL HIGH (ref 3.8–10.5)
WBC # BLD: 44.97 K/UL — CRITICAL HIGH (ref 3.8–10.5)
WBC # BLD: 45.04 K/UL — CRITICAL HIGH (ref 3.8–10.5)
WBC # BLD: 7.27 K/UL — SIGNIFICANT CHANGE UP (ref 3.8–10.5)
WBC # FLD AUTO: 10.29 K/UL — SIGNIFICANT CHANGE UP (ref 3.8–10.5)
WBC # FLD AUTO: 10.9 K/UL — HIGH (ref 3.8–10.5)
WBC # FLD AUTO: 10.98 K/UL — HIGH (ref 3.8–10.5)
WBC # FLD AUTO: 11.23 K/UL — HIGH (ref 3.8–10.5)
WBC # FLD AUTO: 11.28 K/UL — HIGH (ref 3.8–10.5)
WBC # FLD AUTO: 11.38 K/UL — HIGH (ref 3.8–10.5)
WBC # FLD AUTO: 11.64 K/UL — HIGH (ref 3.8–10.5)
WBC # FLD AUTO: 11.87 K/UL — HIGH (ref 3.8–10.5)
WBC # FLD AUTO: 11.93 K/UL — HIGH (ref 3.8–10.5)
WBC # FLD AUTO: 12.12 K/UL — HIGH (ref 3.8–10.5)
WBC # FLD AUTO: 12.24 K/UL — HIGH (ref 3.8–10.5)
WBC # FLD AUTO: 14.63 K/UL — HIGH (ref 3.8–10.5)
WBC # FLD AUTO: 15.15 K/UL — HIGH (ref 3.8–10.5)
WBC # FLD AUTO: 15.17 K/UL — HIGH (ref 3.8–10.5)
WBC # FLD AUTO: 16.39 K/UL — HIGH (ref 3.8–10.5)
WBC # FLD AUTO: 16.4 K/UL — HIGH (ref 3.8–10.5)
WBC # FLD AUTO: 16.49 K/UL — HIGH (ref 3.8–10.5)
WBC # FLD AUTO: 17.23 K/UL — HIGH (ref 3.8–10.5)
WBC # FLD AUTO: 17.85 K/UL — HIGH (ref 3.8–10.5)
WBC # FLD AUTO: 18.43 K/UL — HIGH (ref 3.8–10.5)
WBC # FLD AUTO: 19.67 K/UL — HIGH (ref 3.8–10.5)
WBC # FLD AUTO: 19.75 K/UL — HIGH (ref 3.8–10.5)
WBC # FLD AUTO: 19.97 K/UL — HIGH (ref 3.8–10.5)
WBC # FLD AUTO: 21.64 K/UL — HIGH (ref 3.8–10.5)
WBC # FLD AUTO: 25.26 K/UL — HIGH (ref 3.8–10.5)
WBC # FLD AUTO: 31.93 K/UL — HIGH (ref 3.8–10.5)
WBC # FLD AUTO: 32.3 K/UL — HIGH (ref 3.8–10.5)
WBC # FLD AUTO: 34.16 K/UL — HIGH (ref 3.8–10.5)
WBC # FLD AUTO: 35.01 K/UL — HIGH (ref 3.8–10.5)
WBC # FLD AUTO: 36.58 K/UL — HIGH (ref 3.8–10.5)
WBC # FLD AUTO: 40.09 K/UL — CRITICAL HIGH (ref 3.8–10.5)
WBC # FLD AUTO: 44.97 K/UL — CRITICAL HIGH (ref 3.8–10.5)
WBC # FLD AUTO: 45.04 K/UL — CRITICAL HIGH (ref 3.8–10.5)
WBC # FLD AUTO: 7.27 K/UL — SIGNIFICANT CHANGE UP (ref 3.8–10.5)
WBC UR QL: 48 /HPF — HIGH (ref 0–5)
WNV IGG TITR FLD: NEGATIVE — SIGNIFICANT CHANGE UP
WNV IGG TITR FLD: NEGATIVE — SIGNIFICANT CHANGE UP
WNV IGM SPEC QL: NEGATIVE — SIGNIFICANT CHANGE UP
WNV IGM SPEC QL: NEGATIVE — SIGNIFICANT CHANGE UP
WNV RNA SPEC QL NAA+PROBE: SIGNIFICANT CHANGE UP

## 2025-01-01 PROCEDURE — 93308 TTE F-UP OR LMTD: CPT | Mod: 26

## 2025-01-01 PROCEDURE — 99292 CRITICAL CARE ADDL 30 MIN: CPT

## 2025-01-01 PROCEDURE — 74018 RADEX ABDOMEN 1 VIEW: CPT | Mod: 26

## 2025-01-01 PROCEDURE — 93010 ELECTROCARDIOGRAM REPORT: CPT

## 2025-01-01 PROCEDURE — 99233 SBSQ HOSP IP/OBS HIGH 50: CPT | Mod: GC

## 2025-01-01 PROCEDURE — 95718 EEG PHYS/QHP 2-12 HR W/VEEG: CPT

## 2025-01-01 PROCEDURE — 99291 CRITICAL CARE FIRST HOUR: CPT

## 2025-01-01 PROCEDURE — 95720 EEG PHY/QHP EA INCR W/VEEG: CPT

## 2025-01-01 PROCEDURE — 99222 1ST HOSP IP/OBS MODERATE 55: CPT

## 2025-01-01 PROCEDURE — 76705 ECHO EXAM OF ABDOMEN: CPT | Mod: 26

## 2025-01-01 PROCEDURE — 76937 US GUIDE VASCULAR ACCESS: CPT | Mod: 26,59

## 2025-01-01 PROCEDURE — 99233 SBSQ HOSP IP/OBS HIGH 50: CPT

## 2025-01-01 PROCEDURE — G0545: CPT

## 2025-01-01 PROCEDURE — 71045 X-RAY EXAM CHEST 1 VIEW: CPT | Mod: 26

## 2025-01-01 PROCEDURE — 71250 CT THORAX DX C-: CPT | Mod: 26

## 2025-01-01 PROCEDURE — 99232 SBSQ HOSP IP/OBS MODERATE 35: CPT

## 2025-01-01 PROCEDURE — 99232 SBSQ HOSP IP/OBS MODERATE 35: CPT | Mod: GC

## 2025-01-01 PROCEDURE — 36800 INSERTION OF CANNULA: CPT

## 2025-01-01 PROCEDURE — 99292 CRITICAL CARE ADDL 30 MIN: CPT | Mod: FS,25

## 2025-01-01 PROCEDURE — 70498 CT ANGIOGRAPHY NECK: CPT | Mod: 26

## 2025-01-01 PROCEDURE — 76604 US EXAM CHEST: CPT | Mod: 26,GC

## 2025-01-01 PROCEDURE — 99291 CRITICAL CARE FIRST HOUR: CPT | Mod: 25

## 2025-01-01 PROCEDURE — 74176 CT ABD & PELVIS W/O CONTRAST: CPT | Mod: 26

## 2025-01-01 PROCEDURE — 99221 1ST HOSP IP/OBS SF/LOW 40: CPT

## 2025-01-01 PROCEDURE — 99292 CRITICAL CARE ADDL 30 MIN: CPT | Mod: 25

## 2025-01-01 PROCEDURE — 76942 ECHO GUIDE FOR BIOPSY: CPT | Mod: 26

## 2025-01-01 PROCEDURE — 93306 TTE W/DOPPLER COMPLETE: CPT | Mod: 26

## 2025-01-01 PROCEDURE — 36573 INSJ PICC RS&I 5 YR+: CPT

## 2025-01-01 PROCEDURE — 43753 TX GASTRO INTUB W/ASP: CPT | Mod: GC,59

## 2025-01-01 PROCEDURE — 36000 PLACE NEEDLE IN VEIN: CPT | Mod: 59

## 2025-01-01 PROCEDURE — 99223 1ST HOSP IP/OBS HIGH 75: CPT | Mod: GC

## 2025-01-01 PROCEDURE — 74177 CT ABD & PELVIS W/CONTRAST: CPT | Mod: 26

## 2025-01-01 PROCEDURE — 76604 US EXAM CHEST: CPT | Mod: 26

## 2025-01-01 PROCEDURE — 90945 DIALYSIS ONE EVALUATION: CPT | Mod: GC

## 2025-01-01 PROCEDURE — 93308 TTE F-UP OR LMTD: CPT | Mod: 26,GC

## 2025-01-01 PROCEDURE — 99291 CRITICAL CARE FIRST HOUR: CPT | Mod: FS

## 2025-01-01 PROCEDURE — 70553 MRI BRAIN STEM W/O & W/DYE: CPT | Mod: 26

## 2025-01-01 PROCEDURE — 70450 CT HEAD/BRAIN W/O DYE: CPT | Mod: 26

## 2025-01-01 PROCEDURE — 47000 NEEDLE BIOPSY OF LIVER PERQ: CPT

## 2025-01-01 PROCEDURE — 93971 EXTREMITY STUDY: CPT | Mod: 26,RT

## 2025-01-01 PROCEDURE — 93923 UPR/LXTR ART STDY 3+ LVLS: CPT | Mod: 26

## 2025-01-01 PROCEDURE — 36620 INSERTION CATHETER ARTERY: CPT

## 2025-01-01 PROCEDURE — 93356 MYOCRD STRAIN IMG SPCKL TRCK: CPT

## 2025-01-01 PROCEDURE — 99285 EMERGENCY DEPT VISIT HI MDM: CPT | Mod: GC

## 2025-01-01 PROCEDURE — 70496 CT ANGIOGRAPHY HEAD: CPT | Mod: 26

## 2025-01-01 PROCEDURE — 71552 MRI CHEST W/O & W/DYE: CPT | Mod: 26

## 2025-01-01 PROCEDURE — 70450 CT HEAD/BRAIN W/O DYE: CPT | Mod: 26,XU

## 2025-01-01 RX ORDER — BUMETANIDE 1 MG/1
4 TABLET ORAL ONCE
Refills: 0 | Status: COMPLETED | OUTPATIENT
Start: 2025-01-01 | End: 2025-01-01

## 2025-01-01 RX ORDER — MEROPENEM 1 G/30ML
INJECTION INTRAVENOUS
Refills: 0 | Status: DISCONTINUED | OUTPATIENT
Start: 2025-01-01 | End: 2025-01-01

## 2025-01-01 RX ORDER — HEPARIN SODIUM 1000 [USP'U]/ML
1000 INJECTION INTRAVENOUS; SUBCUTANEOUS
Qty: 25000 | Refills: 0 | Status: DISCONTINUED | OUTPATIENT
Start: 2025-01-01 | End: 2025-01-01

## 2025-01-01 RX ORDER — PIPERACILLIN-TAZO-DEXTROSE,ISO 3.375G/5
3.38 IV SOLUTION, PIGGYBACK PREMIX FROZEN(ML) INTRAVENOUS ONCE
Refills: 0 | Status: COMPLETED | OUTPATIENT
Start: 2025-01-01 | End: 2025-01-01

## 2025-01-01 RX ORDER — FENTANYL CITRATE-0.9 % NACL/PF 100MCG/2ML
25 SYRINGE (ML) INTRAVENOUS ONCE
Refills: 0 | Status: DISCONTINUED | OUTPATIENT
Start: 2025-01-01 | End: 2025-01-01

## 2025-01-01 RX ORDER — FENTANYL CITRATE-0.9 % NACL/PF 100MCG/2ML
50 SYRINGE (ML) INTRAVENOUS ONCE
Refills: 0 | Status: DISCONTINUED | OUTPATIENT
Start: 2025-01-01 | End: 2025-01-01

## 2025-01-01 RX ORDER — PIPERACILLIN-TAZO-DEXTROSE,ISO 3.375G/5
4.5 IV SOLUTION, PIGGYBACK PREMIX FROZEN(ML) INTRAVENOUS EVERY 8 HOURS
Refills: 0 | Status: DISCONTINUED | OUTPATIENT
Start: 2025-01-01 | End: 2025-01-01

## 2025-01-01 RX ORDER — GANCICLOVIR 250 MG
115 CAPSULE ORAL
Refills: 0 | Status: DISCONTINUED | OUTPATIENT
Start: 2025-01-01 | End: 2025-03-10

## 2025-01-01 RX ORDER — SODIUM CHLORIDE 9 G/1000ML
1000 INJECTION, SOLUTION INTRAVENOUS
Refills: 0 | Status: COMPLETED | OUTPATIENT
Start: 2025-01-01 | End: 2025-01-01

## 2025-01-01 RX ORDER — TACROLIMUS 0.5 MG/1
3 CAPSULE ORAL
Refills: 0 | Status: DISCONTINUED | OUTPATIENT
Start: 2025-01-01 | End: 2025-01-01

## 2025-01-01 RX ORDER — LORAZEPAM 4 MG/ML
1 VIAL (ML) INJECTION ONCE
Refills: 0 | Status: DISCONTINUED | OUTPATIENT
Start: 2025-01-01 | End: 2025-01-01

## 2025-01-01 RX ORDER — CALCIUM GLUCONATE 20 MG/ML
1 INJECTION, SOLUTION INTRAVENOUS ONCE
Refills: 0 | Status: COMPLETED | OUTPATIENT
Start: 2025-01-01 | End: 2025-01-01

## 2025-01-01 RX ORDER — LORAZEPAM 4 MG/ML
0.5 VIAL (ML) INJECTION ONCE
Refills: 0 | Status: DISCONTINUED | OUTPATIENT
Start: 2025-01-01 | End: 2025-01-01

## 2025-01-01 RX ORDER — MEROPENEM 1 G/30ML
1000 INJECTION INTRAVENOUS EVERY 24 HOURS
Refills: 0 | Status: DISCONTINUED | OUTPATIENT
Start: 2025-01-01 | End: 2025-01-01

## 2025-01-01 RX ORDER — VALGANCICLOVIR 450 MG/1
450 TABLET, FILM COATED ORAL
Refills: 0 | Status: DISCONTINUED | OUTPATIENT
Start: 2025-01-01 | End: 2025-01-01

## 2025-01-01 RX ORDER — SODIUM ZIRCONIUM CYCLOSILICATE 5 G/5G
10 POWDER, FOR SUSPENSION ORAL ONCE
Refills: 0 | Status: COMPLETED | OUTPATIENT
Start: 2025-01-01 | End: 2025-01-01

## 2025-01-01 RX ORDER — METOPROLOL SUCCINATE 50 MG/1
50 TABLET, EXTENDED RELEASE ORAL
Refills: 0 | Status: DISCONTINUED | OUTPATIENT
Start: 2025-01-01 | End: 2025-01-01

## 2025-01-01 RX ORDER — HEPARIN SODIUM 1000 [USP'U]/ML
5000 INJECTION INTRAVENOUS; SUBCUTANEOUS EVERY 8 HOURS
Refills: 0 | Status: DISCONTINUED | OUTPATIENT
Start: 2025-01-01 | End: 2025-01-01

## 2025-01-01 RX ORDER — SODIUM CHLORIDE 9 G/1000ML
1000 INJECTION, SOLUTION INTRAVENOUS
Refills: 0 | Status: DISCONTINUED | OUTPATIENT
Start: 2025-01-01 | End: 2025-01-01

## 2025-01-01 RX ORDER — DEXMEDETOMIDINE HYDROCHLORIDE IN SODIUM CHLORIDE 4 UG/ML
0.5 INJECTION INTRAVENOUS
Qty: 200 | Refills: 0 | Status: DISCONTINUED | OUTPATIENT
Start: 2025-01-01 | End: 2025-01-01

## 2025-01-01 RX ORDER — DEXTROSE 50 % IN WATER 50 %
15 SYRINGE (ML) INTRAVENOUS ONCE
Refills: 0 | Status: DISCONTINUED | OUTPATIENT
Start: 2025-01-01 | End: 2025-01-01

## 2025-01-01 RX ORDER — MIDAZOLAM IN 0.9 % SOD.CHLORID 1 MG/ML
2 PLASTIC BAG, INJECTION (ML) INTRAVENOUS ONCE
Refills: 0 | Status: DISCONTINUED | OUTPATIENT
Start: 2025-01-01 | End: 2025-01-01

## 2025-01-01 RX ORDER — NOREPINEPHRINE BITARTRATE 8 MG
0.7 SOLUTION INTRAVENOUS
Qty: 8 | Refills: 0 | Status: DISCONTINUED | OUTPATIENT
Start: 2025-01-01 | End: 2025-01-01

## 2025-01-01 RX ORDER — PREDNISONE 20 MG/1
15 TABLET ORAL DAILY
Refills: 0 | Status: DISCONTINUED | OUTPATIENT
Start: 2025-01-01 | End: 2025-03-10

## 2025-01-01 RX ORDER — SODIUM BICARBONATE 1 MEQ/ML
50 SYRINGE (ML) INTRAVENOUS ONCE
Refills: 0 | Status: COMPLETED | OUTPATIENT
Start: 2025-01-01 | End: 2025-01-01

## 2025-01-01 RX ORDER — APIXABAN 2.5 MG/1
5 TABLET, FILM COATED ORAL EVERY 12 HOURS
Refills: 0 | Status: DISCONTINUED | OUTPATIENT
Start: 2025-01-01 | End: 2025-01-01

## 2025-01-01 RX ORDER — HALOPERIDOL 10 MG/1
1.5 TABLET ORAL ONCE
Refills: 0 | Status: DISCONTINUED | OUTPATIENT
Start: 2025-01-01 | End: 2025-01-01

## 2025-01-01 RX ORDER — PIPERACILLIN-TAZO-DEXTROSE,ISO 3.375G/5
2.25 IV SOLUTION, PIGGYBACK PREMIX FROZEN(ML) INTRAVENOUS EVERY 8 HOURS
Refills: 0 | Status: DISCONTINUED | OUTPATIENT
Start: 2025-01-01 | End: 2025-03-10

## 2025-01-01 RX ORDER — GANCICLOVIR 250 MG
230 CAPSULE ORAL EVERY 24 HOURS
Refills: 0 | Status: DISCONTINUED | OUTPATIENT
Start: 2025-01-01 | End: 2025-01-01

## 2025-01-01 RX ORDER — SODIUM ZIRCONIUM CYCLOSILICATE 5 G/5G
10 POWDER, FOR SUSPENSION ORAL THREE TIMES A DAY
Refills: 0 | Status: COMPLETED | OUTPATIENT
Start: 2025-01-01 | End: 2025-01-01

## 2025-01-01 RX ORDER — METOPROLOL SUCCINATE 50 MG/1
200 TABLET, EXTENDED RELEASE ORAL DAILY
Refills: 0 | Status: DISCONTINUED | OUTPATIENT
Start: 2025-01-01 | End: 2025-01-01

## 2025-01-01 RX ORDER — VANCOMYCIN HCL IN 5 % DEXTROSE 1.5G/250ML
750 PLASTIC BAG, INJECTION (ML) INTRAVENOUS EVERY 12 HOURS
Refills: 0 | Status: DISCONTINUED | OUTPATIENT
Start: 2025-01-01 | End: 2025-03-10

## 2025-01-01 RX ORDER — TACROLIMUS 0.5 MG/1
1 CAPSULE ORAL
Refills: 0 | Status: DISCONTINUED | OUTPATIENT
Start: 2025-01-01 | End: 2025-01-01

## 2025-01-01 RX ORDER — NOREPINEPHRINE BITARTRATE 8 MG
0.05 SOLUTION INTRAVENOUS
Qty: 8 | Refills: 0 | Status: DISCONTINUED | OUTPATIENT
Start: 2025-01-01 | End: 2025-01-01

## 2025-01-01 RX ORDER — PIPERACILLIN-TAZO-DEXTROSE,ISO 3.375G/5
3.38 IV SOLUTION, PIGGYBACK PREMIX FROZEN(ML) INTRAVENOUS EVERY 8 HOURS
Refills: 0 | Status: DISCONTINUED | OUTPATIENT
Start: 2025-01-01 | End: 2025-01-01

## 2025-01-01 RX ORDER — SEVELAMER HYDROCHLORIDE 800 MG/1
1600 TABLET ORAL EVERY 8 HOURS
Refills: 0 | Status: DISCONTINUED | OUTPATIENT
Start: 2025-01-01 | End: 2025-01-01

## 2025-01-01 RX ORDER — TACROLIMUS 0.5 MG/1
1.75 CAPSULE ORAL
Refills: 0 | Status: DISCONTINUED | OUTPATIENT
Start: 2025-01-01 | End: 2025-01-01

## 2025-01-01 RX ORDER — ACETAMINOPHEN 500 MG/5ML
650 LIQUID (ML) ORAL ONCE
Refills: 0 | Status: COMPLETED | OUTPATIENT
Start: 2025-01-01 | End: 2025-01-01

## 2025-01-01 RX ORDER — GANCICLOVIR 250 MG
115 CAPSULE ORAL
Refills: 0 | Status: DISCONTINUED | OUTPATIENT
Start: 2025-01-01 | End: 2025-01-01

## 2025-01-01 RX ORDER — POLYETHYLENE GLYCOL 3350 17 G/17G
17 POWDER, FOR SOLUTION ORAL DAILY
Refills: 0 | Status: DISCONTINUED | OUTPATIENT
Start: 2025-01-01 | End: 2025-01-01

## 2025-01-01 RX ORDER — DESMOPRESSIN ACETATE 4 UG/ML
27 INJECTION INTRAVENOUS ONCE
Refills: 0 | Status: COMPLETED | OUTPATIENT
Start: 2025-01-01 | End: 2025-01-01

## 2025-01-01 RX ORDER — HEPARIN SODIUM 1000 [USP'U]/ML
300 INJECTION INTRAVENOUS; SUBCUTANEOUS
Qty: 10000 | Refills: 0 | Status: DISCONTINUED | OUTPATIENT
Start: 2025-01-01 | End: 2025-03-10

## 2025-01-01 RX ORDER — VANCOMYCIN HCL IN 5 % DEXTROSE 1.5G/250ML
750 PLASTIC BAG, INJECTION (ML) INTRAVENOUS ONCE
Refills: 0 | Status: COMPLETED | OUTPATIENT
Start: 2025-01-01 | End: 2025-01-01

## 2025-01-01 RX ORDER — VASOPRESSIN 20 [USP'U]/ML
0.04 INJECTION INTRAVENOUS
Qty: 40 | Refills: 0 | Status: DISCONTINUED | OUTPATIENT
Start: 2025-01-01 | End: 2025-01-01

## 2025-01-01 RX ORDER — FUROSEMIDE 10 MG/ML
40 INJECTION INTRAMUSCULAR; INTRAVENOUS ONCE
Refills: 0 | Status: COMPLETED | OUTPATIENT
Start: 2025-01-01 | End: 2025-01-01

## 2025-01-01 RX ORDER — HYDROCORTISONE 20 MG
75 TABLET ORAL EVERY 8 HOURS
Refills: 0 | Status: DISCONTINUED | OUTPATIENT
Start: 2025-01-01 | End: 2025-01-01

## 2025-01-01 RX ORDER — ATORVASTATIN CALCIUM 80 MG/1
10 TABLET, FILM COATED ORAL AT BEDTIME
Refills: 0 | Status: DISCONTINUED | OUTPATIENT
Start: 2025-01-01 | End: 2025-01-01

## 2025-01-01 RX ORDER — MELATONIN 5 MG
3 TABLET ORAL AT BEDTIME
Refills: 0 | Status: DISCONTINUED | OUTPATIENT
Start: 2025-01-01 | End: 2025-01-01

## 2025-01-01 RX ORDER — METOPROLOL SUCCINATE 50 MG/1
100 TABLET, EXTENDED RELEASE ORAL DAILY
Refills: 0 | Status: DISCONTINUED | OUTPATIENT
Start: 2025-01-01 | End: 2025-01-01

## 2025-01-01 RX ORDER — ACETAMINOPHEN 500 MG/5ML
650 LIQUID (ML) ORAL EVERY 6 HOURS
Refills: 0 | Status: DISCONTINUED | OUTPATIENT
Start: 2025-01-01 | End: 2025-01-01

## 2025-01-01 RX ORDER — SODIUM CHLORIDE 9 G/1000ML
1000 INJECTION, SOLUTION INTRAVENOUS ONCE
Refills: 0 | Status: COMPLETED | OUTPATIENT
Start: 2025-01-01 | End: 2025-01-01

## 2025-01-01 RX ORDER — CALCIUM GLUCONATE 20 MG/ML
2 INJECTION, SOLUTION INTRAVENOUS ONCE
Refills: 0 | Status: COMPLETED | OUTPATIENT
Start: 2025-01-01 | End: 2025-01-01

## 2025-01-01 RX ORDER — METOPROLOL SUCCINATE 50 MG/1
25 TABLET, EXTENDED RELEASE ORAL
Refills: 0 | Status: DISCONTINUED | OUTPATIENT
Start: 2025-01-01 | End: 2025-01-01

## 2025-01-01 RX ORDER — LEVOTHYROXINE SODIUM 300 MCG
10 TABLET ORAL
Qty: 200 | Refills: 0 | Status: DISCONTINUED | OUTPATIENT
Start: 2025-01-01 | End: 2025-03-10

## 2025-01-01 RX ORDER — PIPERACILLIN-TAZO-DEXTROSE,ISO 3.375G/5
3.38 IV SOLUTION, PIGGYBACK PREMIX FROZEN(ML) INTRAVENOUS ONCE
Refills: 0 | Status: DISCONTINUED | OUTPATIENT
Start: 2025-01-01 | End: 2025-01-01

## 2025-01-01 RX ORDER — GANCICLOVIR 250 MG
CAPSULE ORAL
Refills: 0 | Status: DISCONTINUED | OUTPATIENT
Start: 2025-01-01 | End: 2025-01-01

## 2025-01-01 RX ORDER — QUETIAPINE FUMARATE 25 MG/1
50 TABLET ORAL ONCE
Refills: 0 | Status: COMPLETED | OUTPATIENT
Start: 2025-01-01 | End: 2025-01-01

## 2025-01-01 RX ORDER — SODIUM BICARBONATE 1 MEQ/ML
0.12 SYRINGE (ML) INTRAVENOUS
Qty: 150 | Refills: 0 | Status: DISCONTINUED | OUTPATIENT
Start: 2025-01-01 | End: 2025-03-10

## 2025-01-01 RX ORDER — ACETYLCYSTEINE 200 MG/ML
4 INHALANT RESPIRATORY (INHALATION) EVERY 4 HOURS
Refills: 0 | Status: DISCONTINUED | OUTPATIENT
Start: 2025-01-01 | End: 2025-03-10

## 2025-01-01 RX ORDER — VANCOMYCIN HCL IN 5 % DEXTROSE 1.5G/250ML
PLASTIC BAG, INJECTION (ML) INTRAVENOUS
Refills: 0 | Status: DISCONTINUED | OUTPATIENT
Start: 2025-01-01 | End: 2025-01-01

## 2025-01-01 RX ORDER — HALOPERIDOL 10 MG/1
5 TABLET ORAL ONCE
Refills: 0 | Status: COMPLETED | OUTPATIENT
Start: 2025-01-01 | End: 2025-01-01

## 2025-01-01 RX ORDER — ATOVAQUONE 750 MG/5ML
1500 SUSPENSION ORAL DAILY
Refills: 0 | Status: DISCONTINUED | OUTPATIENT
Start: 2025-01-01 | End: 2025-03-10

## 2025-01-01 RX ORDER — ASPIRIN 325 MG
81 TABLET ORAL DAILY
Refills: 0 | Status: DISCONTINUED | OUTPATIENT
Start: 2025-01-01 | End: 2025-01-01

## 2025-01-01 RX ORDER — MEROPENEM 1 G/30ML
1000 INJECTION INTRAVENOUS EVERY 12 HOURS
Refills: 0 | Status: DISCONTINUED | OUTPATIENT
Start: 2025-01-01 | End: 2025-01-01

## 2025-01-01 RX ORDER — SODIUM CHLORIDE 9 G/1000ML
500 INJECTION, SOLUTION INTRAVENOUS ONCE
Refills: 0 | Status: COMPLETED | OUTPATIENT
Start: 2025-01-01 | End: 2025-01-01

## 2025-01-01 RX ORDER — LORAZEPAM 4 MG/ML
0.5 VIAL (ML) INJECTION EVERY 6 HOURS
Refills: 0 | Status: DISCONTINUED | OUTPATIENT
Start: 2025-01-01 | End: 2025-01-01

## 2025-01-01 RX ORDER — DEXTROSE 50 % IN WATER 50 %
50 SYRINGE (ML) INTRAVENOUS ONCE
Refills: 0 | Status: COMPLETED | OUTPATIENT
Start: 2025-01-01 | End: 2025-01-01

## 2025-01-01 RX ORDER — ALBUMIN (HUMAN) 12.5 G/50ML
250 INJECTION, SOLUTION INTRAVENOUS ONCE
Refills: 0 | Status: COMPLETED | OUTPATIENT
Start: 2025-01-01 | End: 2025-01-01

## 2025-01-01 RX ORDER — GLUCAGON 3 MG/1
1 POWDER NASAL ONCE
Refills: 0 | Status: DISCONTINUED | OUTPATIENT
Start: 2025-01-01 | End: 2025-01-01

## 2025-01-01 RX ORDER — DIGOXIN 250 UG/1
250 TABLET ORAL ONCE
Refills: 0 | Status: DISCONTINUED | OUTPATIENT
Start: 2025-01-01 | End: 2025-01-01

## 2025-01-01 RX ORDER — TACROLIMUS 0.5 MG/1
3.5 CAPSULE ORAL
Refills: 0 | Status: DISCONTINUED | OUTPATIENT
Start: 2025-01-01 | End: 2025-01-01

## 2025-01-01 RX ORDER — INSULIN LISPRO 100 U/ML
INJECTION, SOLUTION INTRAVENOUS; SUBCUTANEOUS
Refills: 0 | Status: DISCONTINUED | OUTPATIENT
Start: 2025-01-01 | End: 2025-01-01

## 2025-01-01 RX ORDER — SENNA 187 MG
2 TABLET ORAL DAILY
Refills: 0 | Status: DISCONTINUED | OUTPATIENT
Start: 2025-01-01 | End: 2025-03-10

## 2025-01-01 RX ORDER — SODIUM BICARBONATE 1 MEQ/ML
25 SYRINGE (ML) INTRAVENOUS ONCE
Refills: 0 | Status: COMPLETED | OUTPATIENT
Start: 2025-01-01 | End: 2025-01-01

## 2025-01-01 RX ORDER — VASOPRESSIN 20 [USP'U]/ML
0.1 INJECTION INTRAVENOUS
Qty: 40 | Refills: 0 | Status: DISCONTINUED | OUTPATIENT
Start: 2025-01-01 | End: 2025-03-10

## 2025-01-01 RX ORDER — SODIUM BICARBONATE 1 MEQ/ML
650 SYRINGE (ML) INTRAVENOUS THREE TIMES A DAY
Refills: 0 | Status: DISCONTINUED | OUTPATIENT
Start: 2025-01-01 | End: 2025-01-01

## 2025-01-01 RX ORDER — VANCOMYCIN HCL IN 5 % DEXTROSE 1.5G/250ML
PLASTIC BAG, INJECTION (ML) INTRAVENOUS
Refills: 0 | Status: DISCONTINUED | OUTPATIENT
Start: 2025-01-01 | End: 2025-03-10

## 2025-01-01 RX ORDER — HYPROMELLOSE 0.4 %
1 DROPS OPHTHALMIC (EYE)
Refills: 0 | Status: DISCONTINUED | OUTPATIENT
Start: 2025-01-01 | End: 2025-03-10

## 2025-01-01 RX ORDER — SEVELAMER HYDROCHLORIDE 800 MG/1
800 TABLET ORAL EVERY 8 HOURS
Refills: 0 | Status: DISCONTINUED | OUTPATIENT
Start: 2025-01-01 | End: 2025-01-01

## 2025-01-01 RX ORDER — LORAZEPAM 4 MG/ML
1 VIAL (ML) INJECTION EVERY 6 HOURS
Refills: 0 | Status: DISCONTINUED | OUTPATIENT
Start: 2025-01-01 | End: 2025-01-01

## 2025-01-01 RX ORDER — INSULIN LISPRO 100 U/ML
INJECTION, SOLUTION INTRAVENOUS; SUBCUTANEOUS AT BEDTIME
Refills: 0 | Status: DISCONTINUED | OUTPATIENT
Start: 2025-01-01 | End: 2025-01-01

## 2025-01-01 RX ORDER — DEXTROSE 50 % IN WATER 50 %
25 SYRINGE (ML) INTRAVENOUS ONCE
Refills: 0 | Status: DISCONTINUED | OUTPATIENT
Start: 2025-01-01 | End: 2025-01-01

## 2025-01-01 RX ORDER — DEXTROSE 50 % IN WATER 50 %
12.5 SYRINGE (ML) INTRAVENOUS ONCE
Refills: 0 | Status: DISCONTINUED | OUTPATIENT
Start: 2025-01-01 | End: 2025-01-01

## 2025-01-01 RX ORDER — TACROLIMUS 0.5 MG/1
0.8 CAPSULE ORAL ONCE
Refills: 0 | Status: COMPLETED | OUTPATIENT
Start: 2025-01-01 | End: 2025-01-01

## 2025-01-01 RX ORDER — CEFEPIME 2 G/20ML
INJECTION, POWDER, FOR SOLUTION INTRAVENOUS
Refills: 0 | Status: DISCONTINUED | OUTPATIENT
Start: 2025-01-01 | End: 2025-01-01

## 2025-01-01 RX ORDER — TACROLIMUS 0.5 MG/1
0.8 CAPSULE ORAL
Refills: 0 | Status: DISCONTINUED | OUTPATIENT
Start: 2025-01-01 | End: 2025-01-01

## 2025-01-01 RX ORDER — QUETIAPINE FUMARATE 25 MG/1
25 TABLET ORAL EVERY 6 HOURS
Refills: 0 | Status: DISCONTINUED | OUTPATIENT
Start: 2025-01-01 | End: 2025-01-01

## 2025-01-01 RX ORDER — HEPARIN SODIUM 1000 [USP'U]/ML
300 INJECTION INTRAVENOUS; SUBCUTANEOUS
Qty: 10000 | Refills: 0 | Status: DISCONTINUED | OUTPATIENT
Start: 2025-01-01 | End: 2025-01-01

## 2025-01-01 RX ORDER — MEROPENEM 1 G/30ML
1000 INJECTION INTRAVENOUS EVERY 8 HOURS
Refills: 0 | Status: DISCONTINUED | OUTPATIENT
Start: 2025-01-01 | End: 2025-01-01

## 2025-01-01 RX ORDER — METOPROLOL SUCCINATE 50 MG/1
5 TABLET, EXTENDED RELEASE ORAL ONCE
Refills: 0 | Status: COMPLETED | OUTPATIENT
Start: 2025-01-01 | End: 2025-01-01

## 2025-01-01 RX ORDER — VANCOMYCIN HCL IN 5 % DEXTROSE 1.5G/250ML
1000 PLASTIC BAG, INJECTION (ML) INTRAVENOUS ONCE
Refills: 0 | Status: COMPLETED | OUTPATIENT
Start: 2025-01-01 | End: 2025-01-01

## 2025-01-01 RX ORDER — MEROPENEM 1 G/30ML
2000 INJECTION INTRAVENOUS ONCE
Refills: 0 | Status: DISCONTINUED | OUTPATIENT
Start: 2025-01-01 | End: 2025-01-01

## 2025-01-01 RX ORDER — TACROLIMUS 0.5 MG/1
2.5 CAPSULE ORAL
Refills: 0 | Status: DISCONTINUED | OUTPATIENT
Start: 2025-01-01 | End: 2025-01-01

## 2025-01-01 RX ORDER — INSULIN LISPRO 100 U/ML
INJECTION, SOLUTION INTRAVENOUS; SUBCUTANEOUS EVERY 6 HOURS
Refills: 0 | Status: DISCONTINUED | OUTPATIENT
Start: 2025-01-01 | End: 2025-03-10

## 2025-01-01 RX ORDER — PREDNISONE 20 MG/1
15 TABLET ORAL DAILY
Refills: 0 | Status: DISCONTINUED | OUTPATIENT
Start: 2025-01-01 | End: 2025-01-01

## 2025-01-01 RX ORDER — PIPERACILLIN-TAZO-DEXTROSE,ISO 3.375G/5
3.38 IV SOLUTION, PIGGYBACK PREMIX FROZEN(ML) INTRAVENOUS EVERY 6 HOURS
Refills: 0 | Status: DISCONTINUED | OUTPATIENT
Start: 2025-01-01 | End: 2025-01-01

## 2025-01-01 RX ORDER — METOPROLOL SUCCINATE 50 MG/1
25 TABLET, EXTENDED RELEASE ORAL ONCE
Refills: 0 | Status: COMPLETED | OUTPATIENT
Start: 2025-01-01 | End: 2025-01-01

## 2025-01-01 RX ORDER — VANCOMYCIN HCL IN 5 % DEXTROSE 1.5G/250ML
750 PLASTIC BAG, INJECTION (ML) INTRAVENOUS EVERY 12 HOURS
Refills: 0 | Status: DISCONTINUED | OUTPATIENT
Start: 2025-01-01 | End: 2025-01-01

## 2025-01-01 RX ORDER — FENTANYL CITRATE-0.9 % NACL/PF 100MCG/2ML
25 SYRINGE (ML) INTRAVENOUS
Refills: 0 | Status: DISCONTINUED | OUTPATIENT
Start: 2025-01-01 | End: 2025-01-01

## 2025-01-01 RX ORDER — LEVOTHYROXINE SODIUM 300 MCG
20 TABLET ORAL ONCE
Refills: 0 | Status: COMPLETED | OUTPATIENT
Start: 2025-01-01 | End: 2025-01-01

## 2025-01-01 RX ORDER — SODIUM BICARBONATE 1 MEQ/ML
50 SYRINGE (ML) INTRAVENOUS
Refills: 0 | Status: COMPLETED | OUTPATIENT
Start: 2025-01-01 | End: 2025-01-01

## 2025-01-01 RX ORDER — NOREPINEPHRINE BITARTRATE 8 MG
0.7 SOLUTION INTRAVENOUS
Qty: 8 | Refills: 0 | Status: DISCONTINUED | OUTPATIENT
Start: 2025-01-01 | End: 2025-03-10

## 2025-01-01 RX ORDER — OLANZAPINE 10 MG/1
5 TABLET ORAL EVERY 12 HOURS
Refills: 0 | Status: DISCONTINUED | OUTPATIENT
Start: 2025-01-01 | End: 2025-01-01

## 2025-01-01 RX ORDER — MEROPENEM 1 G/30ML
500 INJECTION INTRAVENOUS EVERY 8 HOURS
Refills: 0 | Status: DISCONTINUED | OUTPATIENT
Start: 2025-01-01 | End: 2025-01-01

## 2025-01-01 RX ORDER — METOPROLOL SUCCINATE 50 MG/1
25 TABLET, EXTENDED RELEASE ORAL ONCE
Refills: 0 | Status: DISCONTINUED | OUTPATIENT
Start: 2025-01-01 | End: 2025-01-01

## 2025-01-01 RX ORDER — IPRATROPIUM BROMIDE AND ALBUTEROL SULFATE .5; 2.5 MG/3ML; MG/3ML
3 SOLUTION RESPIRATORY (INHALATION) EVERY 6 HOURS
Refills: 0 | Status: DISCONTINUED | OUTPATIENT
Start: 2025-01-01 | End: 2025-03-10

## 2025-01-01 RX ORDER — MIDODRINE HYDROCHLORIDE 5 MG/1
5 TABLET ORAL EVERY 8 HOURS
Refills: 0 | Status: DISCONTINUED | OUTPATIENT
Start: 2025-01-01 | End: 2025-01-01

## 2025-01-01 RX ORDER — QUETIAPINE FUMARATE 25 MG/1
25 TABLET ORAL ONCE
Refills: 0 | Status: COMPLETED | OUTPATIENT
Start: 2025-01-01 | End: 2025-01-01

## 2025-01-01 RX ORDER — SODIUM BICARBONATE 1 MEQ/ML
650 SYRINGE (ML) INTRAVENOUS EVERY 8 HOURS
Refills: 0 | Status: DISCONTINUED | OUTPATIENT
Start: 2025-01-01 | End: 2025-03-10

## 2025-01-01 RX ORDER — DEXTROSE 50 % IN WATER 50 %
25 SYRINGE (ML) INTRAVENOUS ONCE
Refills: 0 | Status: COMPLETED | OUTPATIENT
Start: 2025-01-01 | End: 2025-01-01

## 2025-01-01 RX ORDER — MEROPENEM 1 G/30ML
1000 INJECTION INTRAVENOUS ONCE
Refills: 0 | Status: COMPLETED | OUTPATIENT
Start: 2025-01-01 | End: 2025-01-01

## 2025-01-01 RX ORDER — SODIUM ZIRCONIUM CYCLOSILICATE 5 G/5G
10 POWDER, FOR SUSPENSION ORAL THREE TIMES A DAY
Refills: 0 | Status: DISCONTINUED | OUTPATIENT
Start: 2025-01-01 | End: 2025-01-01

## 2025-01-01 RX ORDER — MIDAZOLAM IN 0.9 % SOD.CHLORID 1 MG/ML
1 PLASTIC BAG, INJECTION (ML) INTRAVENOUS ONCE
Refills: 0 | Status: DISCONTINUED | OUTPATIENT
Start: 2025-01-01 | End: 2025-01-01

## 2025-01-01 RX ADMIN — Medication 500000 UNIT(S): at 00:09

## 2025-01-01 RX ADMIN — QUETIAPINE FUMARATE 50 MILLIGRAM(S): 25 TABLET ORAL at 03:36

## 2025-01-01 RX ADMIN — DEXMEDETOMIDINE HYDROCHLORIDE IN SODIUM CHLORIDE 11.3 MICROGRAM(S)/KG/HR: 4 INJECTION INTRAVENOUS at 19:30

## 2025-01-01 RX ADMIN — Medication 75 MILLIGRAM(S): at 05:30

## 2025-01-01 RX ADMIN — Medication 650 MILLIGRAM(S): at 21:18

## 2025-01-01 RX ADMIN — Medication 1 MILLIGRAM(S): at 12:30

## 2025-01-01 RX ADMIN — Medication 25 MICROGRAM(S): at 16:15

## 2025-01-01 RX ADMIN — Medication 500000 UNIT(S): at 00:42

## 2025-01-01 RX ADMIN — Medication 5 UNIT(S): at 02:11

## 2025-01-01 RX ADMIN — Medication 1 APPLICATION(S): at 21:45

## 2025-01-01 RX ADMIN — PREDNISONE 15 MILLIGRAM(S): 20 TABLET ORAL at 06:22

## 2025-01-01 RX ADMIN — Medication 81 MILLIGRAM(S): at 12:10

## 2025-01-01 RX ADMIN — Medication 25 MICROGRAM(S): at 11:18

## 2025-01-01 RX ADMIN — HALOPERIDOL 5 MILLIGRAM(S): 10 TABLET ORAL at 20:39

## 2025-01-01 RX ADMIN — SEVELAMER HYDROCHLORIDE 1600 MILLIGRAM(S): 800 TABLET ORAL at 13:15

## 2025-01-01 RX ADMIN — MEROPENEM 100 MILLIGRAM(S): 1 INJECTION INTRAVENOUS at 20:38

## 2025-01-01 RX ADMIN — Medication 25 MICROGRAM(S): at 11:33

## 2025-01-01 RX ADMIN — Medication 50 MILLILITER(S): at 18:38

## 2025-01-01 RX ADMIN — METOPROLOL SUCCINATE 25 MILLIGRAM(S): 50 TABLET, EXTENDED RELEASE ORAL at 06:52

## 2025-01-01 RX ADMIN — PREDNISONE 15 MILLIGRAM(S): 20 TABLET ORAL at 05:16

## 2025-01-01 RX ADMIN — Medication 75 MEQ/KG/HR: at 12:24

## 2025-01-01 RX ADMIN — Medication 200 GRAM(S): at 13:50

## 2025-01-01 RX ADMIN — INSULIN LISPRO 1: 100 INJECTION, SOLUTION INTRAVENOUS; SUBCUTANEOUS at 06:52

## 2025-01-01 RX ADMIN — SEVELAMER HYDROCHLORIDE 800 MILLIGRAM(S): 800 TABLET ORAL at 21:35

## 2025-01-01 RX ADMIN — Medication 40 MILLIGRAM(S): at 17:20

## 2025-01-01 RX ADMIN — Medication 1000 UNIT(S): at 15:04

## 2025-01-01 RX ADMIN — INSULIN LISPRO 1: 100 INJECTION, SOLUTION INTRAVENOUS; SUBCUTANEOUS at 23:06

## 2025-01-01 RX ADMIN — HEPARIN SODIUM 8 UNIT(S)/HR: 1000 INJECTION INTRAVENOUS; SUBCUTANEOUS at 18:46

## 2025-01-01 RX ADMIN — Medication 40 MILLIGRAM(S): at 06:53

## 2025-01-01 RX ADMIN — POLYETHYLENE GLYCOL 3350 17 GRAM(S): 17 POWDER, FOR SOLUTION ORAL at 11:48

## 2025-01-01 RX ADMIN — IPRATROPIUM BROMIDE AND ALBUTEROL SULFATE 3 MILLILITER(S): .5; 2.5 SOLUTION RESPIRATORY (INHALATION) at 17:27

## 2025-01-01 RX ADMIN — Medication 2 TABLET(S): at 11:52

## 2025-01-01 RX ADMIN — Medication 25 GRAM(S): at 13:01

## 2025-01-01 RX ADMIN — Medication 1 DROP(S): at 23:06

## 2025-01-01 RX ADMIN — METOPROLOL SUCCINATE 100 MILLIGRAM(S): 50 TABLET, EXTENDED RELEASE ORAL at 09:37

## 2025-01-01 RX ADMIN — Medication 250 MILLIGRAM(S): at 05:32

## 2025-01-01 RX ADMIN — Medication 25 GRAM(S): at 21:33

## 2025-01-01 RX ADMIN — Medication 500000 UNIT(S): at 17:54

## 2025-01-01 RX ADMIN — QUETIAPINE FUMARATE 25 MILLIGRAM(S): 25 TABLET ORAL at 11:07

## 2025-01-01 RX ADMIN — Medication 650 MILLIGRAM(S): at 11:13

## 2025-01-01 RX ADMIN — PREDNISONE 15 MILLIGRAM(S): 20 TABLET ORAL at 05:12

## 2025-01-01 RX ADMIN — ACETYLCYSTEINE 4 MILLILITER(S): 200 INHALANT RESPIRATORY (INHALATION) at 05:06

## 2025-01-01 RX ADMIN — Medication 1000 UNIT(S): at 09:55

## 2025-01-01 RX ADMIN — ATOVAQUONE 1500 MILLIGRAM(S): 750 SUSPENSION ORAL at 11:44

## 2025-01-01 RX ADMIN — Medication 500000 UNIT(S): at 17:23

## 2025-01-01 RX ADMIN — ATORVASTATIN CALCIUM 10 MILLIGRAM(S): 80 TABLET, FILM COATED ORAL at 21:34

## 2025-01-01 RX ADMIN — ATOVAQUONE 1500 MILLIGRAM(S): 750 SUSPENSION ORAL at 13:08

## 2025-01-01 RX ADMIN — Medication 650 MILLIGRAM(S): at 05:15

## 2025-01-01 RX ADMIN — Medication 1 DROP(S): at 21:35

## 2025-01-01 RX ADMIN — PREDNISONE 15 MILLIGRAM(S): 20 TABLET ORAL at 12:05

## 2025-01-01 RX ADMIN — INSULIN LISPRO 1: 100 INJECTION, SOLUTION INTRAVENOUS; SUBCUTANEOUS at 13:01

## 2025-01-01 RX ADMIN — Medication 25 GRAM(S): at 23:00

## 2025-01-01 RX ADMIN — Medication 25 MILLILITER(S): at 17:44

## 2025-01-01 RX ADMIN — Medication 25 MILLIEQUIVALENT(S): at 10:38

## 2025-01-01 RX ADMIN — Medication 40 MILLIGRAM(S): at 18:01

## 2025-01-01 RX ADMIN — Medication 40 MILLIGRAM(S): at 17:01

## 2025-01-01 RX ADMIN — Medication 25 GRAM(S): at 13:08

## 2025-01-01 RX ADMIN — Medication 25 GRAM(S): at 05:02

## 2025-01-01 RX ADMIN — Medication 75 MILLIGRAM(S): at 05:15

## 2025-01-01 RX ADMIN — DEXMEDETOMIDINE HYDROCHLORIDE IN SODIUM CHLORIDE 11.3 MICROGRAM(S)/KG/HR: 4 INJECTION INTRAVENOUS at 20:02

## 2025-01-01 RX ADMIN — Medication 50 MILLIEQUIVALENT(S): at 21:45

## 2025-01-01 RX ADMIN — Medication 25 MICROGRAM(S): at 00:38

## 2025-01-01 RX ADMIN — NOREPINEPHRINE BITARTRATE 119 MICROGRAM(S)/KG/MIN: 8 SOLUTION at 08:33

## 2025-01-01 RX ADMIN — HEPARIN SODIUM 5000 UNIT(S): 1000 INJECTION INTRAVENOUS; SUBCUTANEOUS at 13:00

## 2025-01-01 RX ADMIN — SEVELAMER HYDROCHLORIDE 800 MILLIGRAM(S): 800 TABLET ORAL at 15:56

## 2025-01-01 RX ADMIN — Medication 25 MICROGRAM(S): at 23:00

## 2025-01-01 RX ADMIN — MEROPENEM 100 MILLIGRAM(S): 1 INJECTION INTRAVENOUS at 18:22

## 2025-01-01 RX ADMIN — VASOPRESSIN 15 UNIT(S)/MIN: 20 INJECTION INTRAVENOUS at 03:47

## 2025-01-01 RX ADMIN — Medication 500000 UNIT(S): at 00:43

## 2025-01-01 RX ADMIN — Medication 1000 UNIT(S): at 11:06

## 2025-01-01 RX ADMIN — Medication 40 MILLIGRAM(S): at 17:36

## 2025-01-01 RX ADMIN — Medication 1 MILLIGRAM(S): at 15:20

## 2025-01-01 RX ADMIN — Medication 1000 UNIT(S): at 13:00

## 2025-01-01 RX ADMIN — Medication 0.5 MILLIGRAM(S): at 11:07

## 2025-01-01 RX ADMIN — INSULIN LISPRO 2: 100 INJECTION, SOLUTION INTRAVENOUS; SUBCUTANEOUS at 17:16

## 2025-01-01 RX ADMIN — HALOPERIDOL 5 MILLIGRAM(S): 10 TABLET ORAL at 11:18

## 2025-01-01 RX ADMIN — TACROLIMUS 1 MILLIGRAM(S): 0.5 CAPSULE ORAL at 19:21

## 2025-01-01 RX ADMIN — Medication 40 MILLIGRAM(S): at 05:16

## 2025-01-01 RX ADMIN — Medication 75 MILLIGRAM(S): at 13:41

## 2025-01-01 RX ADMIN — METOPROLOL SUCCINATE 25 MILLIGRAM(S): 50 TABLET, EXTENDED RELEASE ORAL at 17:43

## 2025-01-01 RX ADMIN — Medication 1000 UNIT(S): at 12:10

## 2025-01-01 RX ADMIN — MIDODRINE HYDROCHLORIDE 5 MILLIGRAM(S): 5 TABLET ORAL at 21:12

## 2025-01-01 RX ADMIN — Medication 75 MILLIGRAM(S): at 21:31

## 2025-01-01 RX ADMIN — TACROLIMUS 1 MILLIGRAM(S): 0.5 CAPSULE ORAL at 21:33

## 2025-01-01 RX ADMIN — Medication 2 MILLIGRAM(S): at 15:48

## 2025-01-01 RX ADMIN — SODIUM ZIRCONIUM CYCLOSILICATE 10 GRAM(S): 5 POWDER, FOR SUSPENSION ORAL at 18:28

## 2025-01-01 RX ADMIN — INSULIN LISPRO 1: 100 INJECTION, SOLUTION INTRAVENOUS; SUBCUTANEOUS at 00:45

## 2025-01-01 RX ADMIN — SODIUM CHLORIDE 500 MILLILITER(S): 9 INJECTION, SOLUTION INTRAVENOUS at 05:37

## 2025-01-01 RX ADMIN — SODIUM ZIRCONIUM CYCLOSILICATE 10 GRAM(S): 5 POWDER, FOR SUSPENSION ORAL at 21:20

## 2025-01-01 RX ADMIN — Medication 15 MILLILITER(S): at 05:16

## 2025-01-01 RX ADMIN — Medication 1000 UNIT(S): at 11:07

## 2025-01-01 RX ADMIN — Medication 75 MILLIGRAM(S): at 23:07

## 2025-01-01 RX ADMIN — Medication 50 MILLIEQUIVALENT(S): at 21:40

## 2025-01-01 RX ADMIN — Medication 25 MICROGRAM(S): at 01:08

## 2025-01-01 RX ADMIN — MEROPENEM 100 MILLIGRAM(S): 1 INJECTION INTRAVENOUS at 13:41

## 2025-01-01 RX ADMIN — Medication 25 GRAM(S): at 06:20

## 2025-01-01 RX ADMIN — Medication 75 MILLIGRAM(S): at 21:12

## 2025-01-01 RX ADMIN — Medication 1 DROP(S): at 17:36

## 2025-01-01 RX ADMIN — Medication 25 MICROGRAM(S): at 00:13

## 2025-01-01 RX ADMIN — NOREPINEPHRINE BITARTRATE 119 MICROGRAM(S)/KG/MIN: 8 SOLUTION at 19:00

## 2025-01-01 RX ADMIN — Medication 0.5 MILLIGRAM(S): at 05:33

## 2025-01-01 RX ADMIN — Medication 650 MILLIGRAM(S): at 06:31

## 2025-01-01 RX ADMIN — Medication 25 GRAM(S): at 15:01

## 2025-01-01 RX ADMIN — PREDNISONE 15 MILLIGRAM(S): 20 TABLET ORAL at 06:20

## 2025-01-01 RX ADMIN — Medication 15 MILLILITER(S): at 17:01

## 2025-01-01 RX ADMIN — PREDNISONE 15 MILLIGRAM(S): 20 TABLET ORAL at 09:36

## 2025-01-01 RX ADMIN — Medication 81 MILLIGRAM(S): at 11:54

## 2025-01-01 RX ADMIN — Medication 1000 UNIT(S): at 11:52

## 2025-01-01 RX ADMIN — DEXMEDETOMIDINE HYDROCHLORIDE IN SODIUM CHLORIDE 11.3 MICROGRAM(S)/KG/HR: 4 INJECTION INTRAVENOUS at 07:19

## 2025-01-01 RX ADMIN — HEPARIN SODIUM 5000 UNIT(S): 1000 INJECTION INTRAVENOUS; SUBCUTANEOUS at 21:17

## 2025-01-01 RX ADMIN — HEPARIN SODIUM 5000 UNIT(S): 1000 INJECTION INTRAVENOUS; SUBCUTANEOUS at 21:32

## 2025-01-01 RX ADMIN — HEPARIN SODIUM 7 UNIT(S)/HR: 1000 INJECTION INTRAVENOUS; SUBCUTANEOUS at 07:18

## 2025-01-01 RX ADMIN — Medication 1000 UNIT(S): at 11:54

## 2025-01-01 RX ADMIN — Medication 1000 UNIT(S): at 12:23

## 2025-01-01 RX ADMIN — Medication 81 MILLIGRAM(S): at 11:42

## 2025-01-01 RX ADMIN — DEXMEDETOMIDINE HYDROCHLORIDE IN SODIUM CHLORIDE 11.3 MICROGRAM(S)/KG/HR: 4 INJECTION INTRAVENOUS at 11:50

## 2025-01-01 RX ADMIN — Medication 500000 UNIT(S): at 17:01

## 2025-01-01 RX ADMIN — CALCIUM GLUCONATE 200 GRAM(S): 20 INJECTION, SOLUTION INTRAVENOUS at 21:16

## 2025-01-01 RX ADMIN — MEROPENEM 100 MILLIGRAM(S): 1 INJECTION INTRAVENOUS at 20:53

## 2025-01-01 RX ADMIN — ALBUMIN (HUMAN) 250 MILLILITER(S): 12.5 INJECTION, SOLUTION INTRAVENOUS at 02:26

## 2025-01-01 RX ADMIN — IPRATROPIUM BROMIDE AND ALBUTEROL SULFATE 3 MILLILITER(S): .5; 2.5 SOLUTION RESPIRATORY (INHALATION) at 05:03

## 2025-01-01 RX ADMIN — Medication 75 MILLIGRAM(S): at 13:01

## 2025-01-01 RX ADMIN — Medication 2 TABLET(S): at 11:45

## 2025-01-01 RX ADMIN — Medication 40 MILLIGRAM(S): at 06:27

## 2025-01-01 RX ADMIN — Medication 500000 UNIT(S): at 17:02

## 2025-01-01 RX ADMIN — NOREPINEPHRINE BITARTRATE 119 MICROGRAM(S)/KG/MIN: 8 SOLUTION at 07:59

## 2025-01-01 RX ADMIN — Medication 500000 UNIT(S): at 12:22

## 2025-01-01 RX ADMIN — VASOPRESSIN 15 UNIT(S)/MIN: 20 INJECTION INTRAVENOUS at 19:44

## 2025-01-01 RX ADMIN — ATOVAQUONE 1500 MILLIGRAM(S): 750 SUSPENSION ORAL at 11:07

## 2025-01-01 RX ADMIN — NOREPINEPHRINE BITARTRATE 119 MICROGRAM(S)/KG/MIN: 8 SOLUTION at 03:47

## 2025-01-01 RX ADMIN — Medication 650 MILLIGRAM(S): at 23:07

## 2025-01-01 RX ADMIN — DESMOPRESSIN ACETATE 227 MICROGRAM(S): 4 INJECTION INTRAVENOUS at 23:05

## 2025-01-01 RX ADMIN — Medication 25 MICROGRAM(S): at 20:07

## 2025-01-01 RX ADMIN — METOPROLOL SUCCINATE 50 MILLIGRAM(S): 50 TABLET, EXTENDED RELEASE ORAL at 20:19

## 2025-01-01 RX ADMIN — Medication 25 MICROGRAM(S): at 05:00

## 2025-01-01 RX ADMIN — VALGANCICLOVIR 450 MILLIGRAM(S): 450 TABLET, FILM COATED ORAL at 06:33

## 2025-01-01 RX ADMIN — Medication 50 MICROGRAM(S): at 14:00

## 2025-01-01 RX ADMIN — Medication 40 MILLIGRAM(S): at 05:12

## 2025-01-01 RX ADMIN — Medication 200 GRAM(S): at 18:07

## 2025-01-01 RX ADMIN — DEXMEDETOMIDINE HYDROCHLORIDE IN SODIUM CHLORIDE 11.3 MICROGRAM(S)/KG/HR: 4 INJECTION INTRAVENOUS at 19:52

## 2025-01-01 RX ADMIN — Medication 75 MILLIGRAM(S): at 05:01

## 2025-01-01 RX ADMIN — TACROLIMUS 1 MILLIGRAM(S): 0.5 CAPSULE ORAL at 07:51

## 2025-01-01 RX ADMIN — Medication 15 MILLILITER(S): at 06:21

## 2025-01-01 RX ADMIN — MEROPENEM 100 MILLIGRAM(S): 1 INJECTION INTRAVENOUS at 01:30

## 2025-01-01 RX ADMIN — Medication 650 MILLIGRAM(S): at 21:35

## 2025-01-01 RX ADMIN — HEPARIN SODIUM 8 UNIT(S)/HR: 1000 INJECTION INTRAVENOUS; SUBCUTANEOUS at 11:11

## 2025-01-01 RX ADMIN — ATOVAQUONE 1500 MILLIGRAM(S): 750 SUSPENSION ORAL at 11:48

## 2025-01-01 RX ADMIN — Medication 1 APPLICATION(S): at 07:09

## 2025-01-01 RX ADMIN — Medication 40 MILLIGRAM(S): at 05:29

## 2025-01-01 RX ADMIN — Medication 75 MILLIGRAM(S): at 21:32

## 2025-01-01 RX ADMIN — Medication 50 MILLILITER(S): at 20:28

## 2025-01-01 RX ADMIN — METOPROLOL SUCCINATE 200 MILLIGRAM(S): 50 TABLET, EXTENDED RELEASE ORAL at 08:51

## 2025-01-01 RX ADMIN — Medication 1 APPLICATION(S): at 12:01

## 2025-01-01 RX ADMIN — METOPROLOL SUCCINATE 25 MILLIGRAM(S): 50 TABLET, EXTENDED RELEASE ORAL at 05:16

## 2025-01-01 RX ADMIN — TACROLIMUS 2.5 MILLIGRAM(S): 0.5 CAPSULE ORAL at 19:48

## 2025-01-01 RX ADMIN — Medication 40 MILLIGRAM(S): at 17:22

## 2025-01-01 RX ADMIN — NOREPINEPHRINE BITARTRATE 119 MICROGRAM(S)/KG/MIN: 8 SOLUTION at 08:02

## 2025-01-01 RX ADMIN — HEPARIN SODIUM 8 UNIT(S)/HR: 1000 INJECTION INTRAVENOUS; SUBCUTANEOUS at 19:30

## 2025-01-01 RX ADMIN — CALCIUM GLUCONATE 200 GRAM(S): 20 INJECTION, SOLUTION INTRAVENOUS at 11:17

## 2025-01-01 RX ADMIN — ATORVASTATIN CALCIUM 10 MILLIGRAM(S): 80 TABLET, FILM COATED ORAL at 21:13

## 2025-01-01 RX ADMIN — Medication 40 MILLIGRAM(S): at 05:32

## 2025-01-01 RX ADMIN — QUETIAPINE FUMARATE 25 MILLIGRAM(S): 25 TABLET ORAL at 22:11

## 2025-01-01 RX ADMIN — MEROPENEM 100 MILLIGRAM(S): 1 INJECTION INTRAVENOUS at 20:44

## 2025-01-01 RX ADMIN — METOPROLOL SUCCINATE 25 MILLIGRAM(S): 50 TABLET, EXTENDED RELEASE ORAL at 18:01

## 2025-01-01 RX ADMIN — QUETIAPINE FUMARATE 25 MILLIGRAM(S): 25 TABLET ORAL at 17:02

## 2025-01-01 RX ADMIN — HEPARIN SODIUM 5000 UNIT(S): 1000 INJECTION INTRAVENOUS; SUBCUTANEOUS at 21:24

## 2025-01-01 RX ADMIN — HEPARIN SODIUM 8 UNIT(S)/HR: 1000 INJECTION INTRAVENOUS; SUBCUTANEOUS at 07:58

## 2025-01-01 RX ADMIN — POLYETHYLENE GLYCOL 3350 17 GRAM(S): 17 POWDER, FOR SOLUTION ORAL at 12:11

## 2025-01-01 RX ADMIN — NOREPINEPHRINE BITARTRATE 119 MICROGRAM(S)/KG/MIN: 8 SOLUTION at 00:39

## 2025-01-01 RX ADMIN — Medication 2 TABLET(S): at 21:35

## 2025-01-01 RX ADMIN — TACROLIMUS 2.5 MILLIGRAM(S): 0.5 CAPSULE ORAL at 09:21

## 2025-01-01 RX ADMIN — Medication 500000 UNIT(S): at 11:54

## 2025-01-01 RX ADMIN — FUROSEMIDE 40 MILLIGRAM(S): 10 INJECTION INTRAMUSCULAR; INTRAVENOUS at 18:48

## 2025-01-01 RX ADMIN — Medication 650 MILLIGRAM(S): at 06:15

## 2025-01-01 RX ADMIN — Medication 50 MICROGRAM(S): at 20:17

## 2025-01-01 RX ADMIN — MEROPENEM 100 MILLIGRAM(S): 1 INJECTION INTRAVENOUS at 19:24

## 2025-01-01 RX ADMIN — DEXMEDETOMIDINE HYDROCHLORIDE IN SODIUM CHLORIDE 11.3 MICROGRAM(S)/KG/HR: 4 INJECTION INTRAVENOUS at 19:33

## 2025-01-01 RX ADMIN — Medication 1 MILLIGRAM(S): at 15:47

## 2025-01-01 RX ADMIN — Medication 25 MICROGRAM(S): at 20:45

## 2025-01-01 RX ADMIN — TACROLIMUS 3 MILLIGRAM(S): 0.5 CAPSULE ORAL at 07:43

## 2025-01-01 RX ADMIN — NOREPINEPHRINE BITARTRATE 119 MICROGRAM(S)/KG/MIN: 8 SOLUTION at 13:11

## 2025-01-01 RX ADMIN — Medication 5 UNIT(S): at 20:28

## 2025-01-01 RX ADMIN — CALCIUM GLUCONATE 200 GRAM(S): 20 INJECTION, SOLUTION INTRAVENOUS at 18:07

## 2025-01-01 RX ADMIN — Medication 1 DROP(S): at 21:41

## 2025-01-01 RX ADMIN — Medication 75 MEQ/KG/HR: at 19:45

## 2025-01-01 RX ADMIN — METOPROLOL SUCCINATE 25 MILLIGRAM(S): 50 TABLET, EXTENDED RELEASE ORAL at 17:36

## 2025-01-01 RX ADMIN — SODIUM CHLORIDE 500 MILLILITER(S): 9 INJECTION, SOLUTION INTRAVENOUS at 13:10

## 2025-01-01 RX ADMIN — Medication 1 DROP(S): at 13:51

## 2025-01-01 RX ADMIN — Medication 1000 UNIT(S): at 11:42

## 2025-01-01 RX ADMIN — INSULIN LISPRO 2: 100 INJECTION, SOLUTION INTRAVENOUS; SUBCUTANEOUS at 06:26

## 2025-01-01 RX ADMIN — Medication 40 MILLIGRAM(S): at 17:24

## 2025-01-01 RX ADMIN — Medication 500000 UNIT(S): at 05:30

## 2025-01-01 RX ADMIN — Medication 500000 UNIT(S): at 17:46

## 2025-01-01 RX ADMIN — Medication 2 TABLET(S): at 12:12

## 2025-01-01 RX ADMIN — Medication 500000 UNIT(S): at 17:06

## 2025-01-01 RX ADMIN — Medication 500000 UNIT(S): at 17:07

## 2025-01-01 RX ADMIN — Medication 1000 UNIT(S): at 11:45

## 2025-01-01 RX ADMIN — Medication 81 MILLIGRAM(S): at 11:07

## 2025-01-01 RX ADMIN — Medication 1000 UNIT(S): at 12:02

## 2025-01-01 RX ADMIN — Medication 81 MILLIGRAM(S): at 11:52

## 2025-01-01 RX ADMIN — Medication 40 MILLIGRAM(S): at 17:07

## 2025-01-01 RX ADMIN — Medication 200 GRAM(S): at 05:16

## 2025-01-01 RX ADMIN — Medication 500000 UNIT(S): at 00:23

## 2025-01-01 RX ADMIN — Medication 25 MICROGRAM(S)/HR: at 19:45

## 2025-01-01 RX ADMIN — Medication 50 MILLIEQUIVALENT(S): at 11:17

## 2025-01-01 RX ADMIN — Medication 2 TABLET(S): at 12:10

## 2025-01-01 RX ADMIN — Medication 1 APPLICATION(S): at 22:00

## 2025-01-01 RX ADMIN — Medication 75 MILLIGRAM(S): at 13:08

## 2025-01-01 RX ADMIN — HEPARIN SODIUM 5000 UNIT(S): 1000 INJECTION INTRAVENOUS; SUBCUTANEOUS at 13:16

## 2025-01-01 RX ADMIN — Medication 500 MILLILITER(S): at 03:27

## 2025-01-01 RX ADMIN — Medication 75 MILLIGRAM(S): at 14:46

## 2025-01-01 RX ADMIN — Medication 650 MILLIGRAM(S): at 05:16

## 2025-01-01 RX ADMIN — Medication 100 MILLIGRAM(S): at 20:35

## 2025-01-01 RX ADMIN — CALCIUM GLUCONATE 100 GRAM(S): 20 INJECTION, SOLUTION INTRAVENOUS at 05:11

## 2025-01-01 RX ADMIN — SODIUM CHLORIDE 70 MILLILITER(S): 9 INJECTION, SOLUTION INTRAVENOUS at 05:22

## 2025-01-01 RX ADMIN — HEPARIN SODIUM 10 UNIT(S)/HR: 1000 INJECTION INTRAVENOUS; SUBCUTANEOUS at 19:58

## 2025-01-01 RX ADMIN — MEROPENEM 100 MILLIGRAM(S): 1 INJECTION INTRAVENOUS at 13:15

## 2025-01-01 RX ADMIN — Medication 500000 UNIT(S): at 17:24

## 2025-01-01 RX ADMIN — Medication 2 TABLET(S): at 11:48

## 2025-01-01 RX ADMIN — HEPARIN SODIUM 7 UNIT(S)/HR: 1000 INJECTION INTRAVENOUS; SUBCUTANEOUS at 19:32

## 2025-01-01 RX ADMIN — Medication 500000 UNIT(S): at 06:52

## 2025-01-01 RX ADMIN — POLYETHYLENE GLYCOL 3350 17 GRAM(S): 17 POWDER, FOR SOLUTION ORAL at 12:02

## 2025-01-01 RX ADMIN — Medication 500000 UNIT(S): at 05:16

## 2025-01-01 RX ADMIN — VALGANCICLOVIR 450 MILLIGRAM(S): 450 TABLET, FILM COATED ORAL at 05:03

## 2025-01-01 RX ADMIN — Medication 2 TABLET(S): at 11:42

## 2025-01-01 RX ADMIN — TACROLIMUS 1.75 MILLIGRAM(S): 0.5 CAPSULE ORAL at 20:02

## 2025-01-01 RX ADMIN — Medication 500000 UNIT(S): at 13:08

## 2025-01-01 RX ADMIN — Medication 25 GRAM(S): at 09:37

## 2025-01-01 RX ADMIN — Medication 500000 UNIT(S): at 11:13

## 2025-01-01 RX ADMIN — Medication 40 MILLIGRAM(S): at 17:23

## 2025-01-01 RX ADMIN — SODIUM ZIRCONIUM CYCLOSILICATE 10 GRAM(S): 5 POWDER, FOR SUSPENSION ORAL at 05:24

## 2025-01-01 RX ADMIN — Medication 250 MILLIGRAM(S): at 19:41

## 2025-01-01 RX ADMIN — ATORVASTATIN CALCIUM 10 MILLIGRAM(S): 80 TABLET, FILM COATED ORAL at 21:19

## 2025-01-01 RX ADMIN — Medication 250 MILLIGRAM(S): at 17:35

## 2025-01-01 RX ADMIN — FUROSEMIDE 40 MILLIGRAM(S): 10 INJECTION INTRAMUSCULAR; INTRAVENOUS at 02:10

## 2025-01-01 RX ADMIN — Medication 25 MILLIEQUIVALENT(S): at 16:40

## 2025-01-01 RX ADMIN — Medication 650 MILLIGRAM(S): at 22:35

## 2025-01-01 RX ADMIN — Medication 40 MILLIGRAM(S): at 06:15

## 2025-01-01 RX ADMIN — HEPARIN SODIUM 5000 UNIT(S): 1000 INJECTION INTRAVENOUS; SUBCUTANEOUS at 13:08

## 2025-01-01 RX ADMIN — Medication 2 TABLET(S): at 12:02

## 2025-01-01 RX ADMIN — ATOVAQUONE 1500 MILLIGRAM(S): 750 SUSPENSION ORAL at 12:03

## 2025-01-01 RX ADMIN — Medication 500000 UNIT(S): at 06:20

## 2025-01-01 RX ADMIN — SODIUM ZIRCONIUM CYCLOSILICATE 10 GRAM(S): 5 POWDER, FOR SUSPENSION ORAL at 06:31

## 2025-01-01 RX ADMIN — PREDNISONE 15 MILLIGRAM(S): 20 TABLET ORAL at 05:24

## 2025-01-01 RX ADMIN — Medication 25 GRAM(S): at 21:11

## 2025-01-01 RX ADMIN — Medication 500000 UNIT(S): at 05:15

## 2025-01-01 RX ADMIN — OLANZAPINE 5 MILLIGRAM(S): 10 TABLET ORAL at 07:43

## 2025-01-01 RX ADMIN — TACROLIMUS 1 MILLIGRAM(S): 0.5 CAPSULE ORAL at 08:15

## 2025-01-01 RX ADMIN — Medication 25 MICROGRAM(S): at 21:30

## 2025-01-01 RX ADMIN — Medication 500000 UNIT(S): at 18:38

## 2025-01-01 RX ADMIN — Medication 1 APPLICATION(S): at 21:30

## 2025-01-01 RX ADMIN — ATORVASTATIN CALCIUM 10 MILLIGRAM(S): 80 TABLET, FILM COATED ORAL at 23:07

## 2025-01-01 RX ADMIN — Medication 650 MILLIGRAM(S): at 21:06

## 2025-01-01 RX ADMIN — Medication 40 MILLIGRAM(S): at 17:02

## 2025-01-01 RX ADMIN — TACROLIMUS 1 MILLIGRAM(S): 0.5 CAPSULE ORAL at 08:50

## 2025-01-01 RX ADMIN — NOREPINEPHRINE BITARTRATE 119 MICROGRAM(S)/KG/MIN: 8 SOLUTION at 19:45

## 2025-01-01 RX ADMIN — INSULIN LISPRO 2: 100 INJECTION, SOLUTION INTRAVENOUS; SUBCUTANEOUS at 11:47

## 2025-01-01 RX ADMIN — ATOVAQUONE 1500 MILLIGRAM(S): 750 SUSPENSION ORAL at 13:00

## 2025-01-01 RX ADMIN — Medication 500000 UNIT(S): at 12:12

## 2025-01-01 RX ADMIN — Medication 500000 UNIT(S): at 05:12

## 2025-01-01 RX ADMIN — TACROLIMUS 1 MILLIGRAM(S): 0.5 CAPSULE ORAL at 21:12

## 2025-01-01 RX ADMIN — TACROLIMUS 2.5 MILLIGRAM(S): 0.5 CAPSULE ORAL at 19:40

## 2025-01-01 RX ADMIN — Medication 25 GRAM(S): at 05:31

## 2025-01-01 RX ADMIN — TACROLIMUS 2.5 MILLIGRAM(S): 0.5 CAPSULE ORAL at 08:12

## 2025-01-01 RX ADMIN — Medication 200 GRAM(S): at 21:38

## 2025-01-01 RX ADMIN — MEROPENEM 100 MILLIGRAM(S): 1 INJECTION INTRAVENOUS at 05:32

## 2025-01-01 RX ADMIN — Medication 40 MILLIGRAM(S): at 17:54

## 2025-01-01 RX ADMIN — Medication 500000 UNIT(S): at 11:30

## 2025-01-01 RX ADMIN — Medication 25 MILLIEQUIVALENT(S): at 10:22

## 2025-01-01 RX ADMIN — HEPARIN SODIUM 5000 UNIT(S): 1000 INJECTION INTRAVENOUS; SUBCUTANEOUS at 13:15

## 2025-01-01 RX ADMIN — TACROLIMUS 1 MILLIGRAM(S): 0.5 CAPSULE ORAL at 21:19

## 2025-01-01 RX ADMIN — Medication 1000 UNIT(S): at 12:11

## 2025-01-01 RX ADMIN — POLYETHYLENE GLYCOL 3350 17 GRAM(S): 17 POWDER, FOR SOLUTION ORAL at 06:36

## 2025-01-01 RX ADMIN — Medication 80 MILLIGRAM(S): at 03:42

## 2025-01-01 RX ADMIN — TACROLIMUS 3 MILLIGRAM(S): 0.5 CAPSULE ORAL at 19:31

## 2025-01-01 RX ADMIN — Medication 3.38 GRAM(S): at 19:03

## 2025-01-01 RX ADMIN — Medication 500000 UNIT(S): at 05:02

## 2025-01-01 RX ADMIN — Medication 0.5 MILLIGRAM(S): at 18:06

## 2025-01-01 RX ADMIN — APIXABAN 5 MILLIGRAM(S): 2.5 TABLET, FILM COATED ORAL at 09:37

## 2025-01-01 RX ADMIN — Medication 40 MILLIGRAM(S): at 05:31

## 2025-01-01 RX ADMIN — NOREPINEPHRINE BITARTRATE 119 MICROGRAM(S)/KG/MIN: 8 SOLUTION at 06:32

## 2025-01-01 RX ADMIN — Medication 250 MILLIGRAM(S): at 18:55

## 2025-01-01 RX ADMIN — Medication 81 MILLIGRAM(S): at 11:44

## 2025-01-01 RX ADMIN — MEROPENEM 100 MILLIGRAM(S): 1 INJECTION INTRAVENOUS at 05:22

## 2025-01-01 RX ADMIN — Medication 650 MILLIGRAM(S): at 13:00

## 2025-01-01 RX ADMIN — Medication 1 APPLICATION(S): at 22:11

## 2025-01-01 RX ADMIN — IPRATROPIUM BROMIDE AND ALBUTEROL SULFATE 3 MILLILITER(S): .5; 2.5 SOLUTION RESPIRATORY (INHALATION) at 23:12

## 2025-01-01 RX ADMIN — Medication 1 APPLICATION(S): at 19:40

## 2025-01-01 RX ADMIN — Medication 500000 UNIT(S): at 17:17

## 2025-01-01 RX ADMIN — MEROPENEM 100 MILLIGRAM(S): 1 INJECTION INTRAVENOUS at 05:11

## 2025-01-01 RX ADMIN — TACROLIMUS 2.5 MILLIGRAM(S): 0.5 CAPSULE ORAL at 07:39

## 2025-01-01 RX ADMIN — SEVELAMER HYDROCHLORIDE 1600 MILLIGRAM(S): 800 TABLET ORAL at 21:17

## 2025-01-01 RX ADMIN — MEROPENEM 100 MILLIGRAM(S): 1 INJECTION INTRAVENOUS at 13:58

## 2025-01-01 RX ADMIN — INSULIN LISPRO 1: 100 INJECTION, SOLUTION INTRAVENOUS; SUBCUTANEOUS at 17:54

## 2025-01-01 RX ADMIN — Medication 40 MILLIGRAM(S): at 17:43

## 2025-01-01 RX ADMIN — VALGANCICLOVIR 450 MILLIGRAM(S): 450 TABLET, FILM COATED ORAL at 10:17

## 2025-01-01 RX ADMIN — Medication 250 MILLIGRAM(S): at 06:53

## 2025-01-01 RX ADMIN — Medication 500000 UNIT(S): at 23:07

## 2025-01-01 RX ADMIN — TACROLIMUS 0.8 MILLIGRAM(S): 0.5 CAPSULE ORAL at 08:06

## 2025-01-01 RX ADMIN — Medication 75 MILLIGRAM(S): at 05:33

## 2025-01-01 RX ADMIN — Medication 75 MILLIGRAM(S): at 13:12

## 2025-01-01 RX ADMIN — Medication 50 MILLILITER(S): at 02:10

## 2025-01-01 RX ADMIN — INSULIN LISPRO 1: 100 INJECTION, SOLUTION INTRAVENOUS; SUBCUTANEOUS at 11:14

## 2025-01-01 RX ADMIN — Medication 500000 UNIT(S): at 23:57

## 2025-01-01 RX ADMIN — Medication 15 MILLILITER(S): at 17:54

## 2025-01-01 RX ADMIN — METOPROLOL SUCCINATE 25 MILLIGRAM(S): 50 TABLET, EXTENDED RELEASE ORAL at 05:32

## 2025-01-01 RX ADMIN — Medication 500000 UNIT(S): at 11:51

## 2025-01-01 RX ADMIN — Medication 500000 UNIT(S): at 05:23

## 2025-01-01 RX ADMIN — Medication 250 MILLIGRAM(S): at 05:16

## 2025-01-01 RX ADMIN — Medication 25 MILLIEQUIVALENT(S): at 11:40

## 2025-01-01 RX ADMIN — Medication 650 MILLIGRAM(S): at 13:51

## 2025-01-01 RX ADMIN — ATORVASTATIN CALCIUM 10 MILLIGRAM(S): 80 TABLET, FILM COATED ORAL at 21:31

## 2025-01-01 RX ADMIN — MEROPENEM 100 MILLIGRAM(S): 1 INJECTION INTRAVENOUS at 14:46

## 2025-01-01 RX ADMIN — Medication 1 APPLICATION(S): at 11:48

## 2025-01-01 RX ADMIN — Medication 1000 UNIT(S): at 11:47

## 2025-01-01 RX ADMIN — Medication 1 DROP(S): at 03:47

## 2025-01-01 RX ADMIN — METOPROLOL SUCCINATE 25 MILLIGRAM(S): 50 TABLET, EXTENDED RELEASE ORAL at 10:45

## 2025-01-01 RX ADMIN — Medication 25 MICROGRAM(S)/HR: at 05:55

## 2025-01-01 RX ADMIN — Medication 75 MILLIGRAM(S): at 05:20

## 2025-01-01 RX ADMIN — Medication 1 APPLICATION(S): at 22:36

## 2025-01-01 RX ADMIN — Medication 25 MICROGRAM(S): at 05:15

## 2025-01-01 RX ADMIN — Medication 50 MICROGRAM(S): at 15:36

## 2025-01-01 RX ADMIN — HEPARIN SODIUM 8 UNIT(S)/HR: 1000 INJECTION INTRAVENOUS; SUBCUTANEOUS at 20:18

## 2025-01-01 RX ADMIN — Medication 75 MILLIGRAM(S): at 05:32

## 2025-01-01 RX ADMIN — Medication 25 MICROGRAM(S): at 05:30

## 2025-01-01 RX ADMIN — Medication 1 DROP(S): at 05:16

## 2025-01-01 RX ADMIN — Medication 1 APPLICATION(S): at 21:12

## 2025-01-01 RX ADMIN — MEROPENEM 100 MILLIGRAM(S): 1 INJECTION INTRAVENOUS at 17:15

## 2025-01-01 RX ADMIN — Medication 1 APPLICATION(S): at 12:03

## 2025-01-01 RX ADMIN — Medication 101 MILLIGRAM(S): at 17:11

## 2025-01-01 RX ADMIN — Medication 75 MILLIGRAM(S): at 15:03

## 2025-01-01 RX ADMIN — DEXMEDETOMIDINE HYDROCHLORIDE IN SODIUM CHLORIDE 11.3 MICROGRAM(S)/KG/HR: 4 INJECTION INTRAVENOUS at 07:24

## 2025-01-01 RX ADMIN — ATORVASTATIN CALCIUM 10 MILLIGRAM(S): 80 TABLET, FILM COATED ORAL at 21:07

## 2025-01-01 RX ADMIN — DEXMEDETOMIDINE HYDROCHLORIDE IN SODIUM CHLORIDE 11.3 MICROGRAM(S)/KG/HR: 4 INJECTION INTRAVENOUS at 17:14

## 2025-01-01 RX ADMIN — INSULIN LISPRO 1: 100 INJECTION, SOLUTION INTRAVENOUS; SUBCUTANEOUS at 17:02

## 2025-01-01 RX ADMIN — INSULIN LISPRO 2: 100 INJECTION, SOLUTION INTRAVENOUS; SUBCUTANEOUS at 17:39

## 2025-01-01 RX ADMIN — TACROLIMUS 1 MILLIGRAM(S): 0.5 CAPSULE ORAL at 09:08

## 2025-01-01 RX ADMIN — DEXMEDETOMIDINE HYDROCHLORIDE IN SODIUM CHLORIDE 11.3 MICROGRAM(S)/KG/HR: 4 INJECTION INTRAVENOUS at 03:09

## 2025-01-01 RX ADMIN — Medication 500000 UNIT(S): at 12:03

## 2025-01-01 RX ADMIN — NOREPINEPHRINE BITARTRATE 119 MICROGRAM(S)/KG/MIN: 8 SOLUTION at 19:23

## 2025-01-01 RX ADMIN — Medication 500000 UNIT(S): at 23:09

## 2025-01-01 RX ADMIN — Medication 1000 UNIT(S): at 11:13

## 2025-01-01 RX ADMIN — SEVELAMER HYDROCHLORIDE 1600 MILLIGRAM(S): 800 TABLET ORAL at 06:20

## 2025-01-01 RX ADMIN — DEXMEDETOMIDINE HYDROCHLORIDE IN SODIUM CHLORIDE 11.3 MICROGRAM(S)/KG/HR: 4 INJECTION INTRAVENOUS at 05:31

## 2025-01-01 RX ADMIN — Medication 15 MILLILITER(S): at 06:31

## 2025-01-01 RX ADMIN — Medication 650 MILLIGRAM(S): at 23:37

## 2025-01-01 RX ADMIN — Medication 20 MICROGRAM(S): at 05:55

## 2025-01-01 RX ADMIN — Medication 500000 UNIT(S): at 01:29

## 2025-01-01 RX ADMIN — HEPARIN SODIUM 5000 UNIT(S): 1000 INJECTION INTRAVENOUS; SUBCUTANEOUS at 06:20

## 2025-01-01 RX ADMIN — Medication 500000 UNIT(S): at 23:21

## 2025-01-01 RX ADMIN — TACROLIMUS 1 MILLIGRAM(S): 0.5 CAPSULE ORAL at 09:24

## 2025-01-01 RX ADMIN — Medication 500000 UNIT(S): at 17:20

## 2025-01-01 RX ADMIN — Medication 25 GRAM(S): at 13:11

## 2025-01-01 RX ADMIN — DEXMEDETOMIDINE HYDROCHLORIDE IN SODIUM CHLORIDE 11.3 MICROGRAM(S)/KG/HR: 4 INJECTION INTRAVENOUS at 20:47

## 2025-01-01 RX ADMIN — Medication 1 DROP(S): at 10:05

## 2025-01-01 RX ADMIN — Medication 500000 UNIT(S): at 06:31

## 2025-01-01 RX ADMIN — Medication 2 TABLET(S): at 21:06

## 2025-01-01 RX ADMIN — Medication 15 MILLILITER(S): at 17:23

## 2025-01-01 RX ADMIN — Medication 100 MILLIGRAM(S): at 18:00

## 2025-01-01 RX ADMIN — SODIUM ZIRCONIUM CYCLOSILICATE 10 GRAM(S): 5 POWDER, FOR SUSPENSION ORAL at 02:13

## 2025-01-01 RX ADMIN — Medication 1 MILLIGRAM(S): at 05:33

## 2025-01-01 RX ADMIN — Medication 500000 UNIT(S): at 15:03

## 2025-01-01 RX ADMIN — Medication 50 MICROGRAM(S): at 15:46

## 2025-01-01 RX ADMIN — POLYETHYLENE GLYCOL 3350 17 GRAM(S): 17 POWDER, FOR SOLUTION ORAL at 11:42

## 2025-01-01 RX ADMIN — Medication 650 MILLIGRAM(S): at 13:13

## 2025-01-01 RX ADMIN — Medication 15 MILLILITER(S): at 17:18

## 2025-01-01 RX ADMIN — Medication 500000 UNIT(S): at 05:19

## 2025-01-01 RX ADMIN — Medication 500000 UNIT(S): at 13:00

## 2025-01-01 RX ADMIN — Medication 25 GRAM(S): at 05:30

## 2025-01-01 RX ADMIN — VALGANCICLOVIR 450 MILLIGRAM(S): 450 TABLET, FILM COATED ORAL at 05:28

## 2025-01-01 RX ADMIN — SEVELAMER HYDROCHLORIDE 1600 MILLIGRAM(S): 800 TABLET ORAL at 13:03

## 2025-01-01 RX ADMIN — OLANZAPINE 5 MILLIGRAM(S): 10 TABLET ORAL at 13:09

## 2025-01-01 RX ADMIN — TACROLIMUS 1 MILLIGRAM(S): 0.5 CAPSULE ORAL at 19:45

## 2025-01-01 RX ADMIN — Medication 500000 UNIT(S): at 11:06

## 2025-01-01 RX ADMIN — SODIUM ZIRCONIUM CYCLOSILICATE 10 GRAM(S): 5 POWDER, FOR SUSPENSION ORAL at 21:07

## 2025-01-01 RX ADMIN — Medication 500000 UNIT(S): at 00:22

## 2025-01-01 RX ADMIN — BUMETANIDE 132 MILLIGRAM(S): 1 TABLET ORAL at 11:44

## 2025-01-01 RX ADMIN — Medication 40 MILLIGRAM(S): at 17:16

## 2025-01-01 RX ADMIN — Medication 1 MILLIGRAM(S): at 21:12

## 2025-01-01 RX ADMIN — Medication 2 TABLET(S): at 06:36

## 2025-01-01 RX ADMIN — Medication 500000 UNIT(S): at 18:01

## 2025-01-01 RX ADMIN — VASOPRESSIN 6 UNIT(S)/MIN: 20 INJECTION INTRAVENOUS at 04:04

## 2025-01-01 RX ADMIN — TACROLIMUS 2.5 MILLIGRAM(S): 0.5 CAPSULE ORAL at 20:05

## 2025-01-01 RX ADMIN — Medication 25 MICROGRAM(S): at 21:27

## 2025-01-01 RX ADMIN — Medication 500000 UNIT(S): at 00:45

## 2025-01-01 RX ADMIN — INSULIN LISPRO 1: 100 INJECTION, SOLUTION INTRAVENOUS; SUBCUTANEOUS at 17:22

## 2025-01-01 RX ADMIN — ATOVAQUONE 1500 MILLIGRAM(S): 750 SUSPENSION ORAL at 11:06

## 2025-01-01 RX ADMIN — Medication 650 MILLIGRAM(S): at 13:11

## 2025-01-01 RX ADMIN — PREDNISONE 15 MILLIGRAM(S): 20 TABLET ORAL at 06:31

## 2025-01-01 RX ADMIN — Medication 40 MILLIGRAM(S): at 05:20

## 2025-01-01 RX ADMIN — Medication 50 MICROGRAM(S): at 20:02

## 2025-01-01 RX ADMIN — POLYETHYLENE GLYCOL 3350 17 GRAM(S): 17 POWDER, FOR SOLUTION ORAL at 11:51

## 2025-01-01 RX ADMIN — Medication 500000 UNIT(S): at 05:24

## 2025-01-01 RX ADMIN — Medication 0.5 MILLIGRAM(S): at 22:33

## 2025-01-01 RX ADMIN — HEPARIN SODIUM 7.5 UNIT(S)/HR: 1000 INJECTION INTRAVENOUS; SUBCUTANEOUS at 16:16

## 2025-01-01 RX ADMIN — Medication 1 DROP(S): at 12:22

## 2025-01-01 RX ADMIN — Medication 500000 UNIT(S): at 12:10

## 2025-01-01 RX ADMIN — Medication 1000 UNIT(S): at 13:08

## 2025-01-01 RX ADMIN — NOREPINEPHRINE BITARTRATE 119 MICROGRAM(S)/KG/MIN: 8 SOLUTION at 20:43

## 2025-01-01 RX ADMIN — PREDNISONE 15 MILLIGRAM(S): 20 TABLET ORAL at 05:17

## 2025-01-01 RX ADMIN — Medication 1 DROP(S): at 01:38

## 2025-01-01 RX ADMIN — Medication 250 MILLIGRAM(S): at 05:20

## 2025-01-01 RX ADMIN — Medication 500000 UNIT(S): at 11:42

## 2025-01-01 RX ADMIN — Medication 25 GRAM(S): at 23:07

## 2025-01-01 RX ADMIN — Medication 500000 UNIT(S): at 11:44

## 2025-01-01 RX ADMIN — TACROLIMUS 1.75 MILLIGRAM(S): 0.5 CAPSULE ORAL at 08:00

## 2025-01-01 RX ADMIN — SODIUM ZIRCONIUM CYCLOSILICATE 10 GRAM(S): 5 POWDER, FOR SUSPENSION ORAL at 13:16

## 2025-01-01 RX ADMIN — TACROLIMUS 3 MILLIGRAM(S): 0.5 CAPSULE ORAL at 20:18

## 2025-01-01 RX ADMIN — Medication 81 MILLIGRAM(S): at 11:47

## 2025-01-01 RX ADMIN — Medication 15 MILLILITER(S): at 06:15

## 2025-01-01 RX ADMIN — Medication 650 MILLIGRAM(S): at 21:45

## 2025-01-01 RX ADMIN — MEROPENEM 100 MILLIGRAM(S): 1 INJECTION INTRAVENOUS at 21:30

## 2025-01-01 RX ADMIN — Medication 40 MILLIGRAM(S): at 05:15

## 2025-01-01 RX ADMIN — ATORVASTATIN CALCIUM 10 MILLIGRAM(S): 80 TABLET, FILM COATED ORAL at 21:32

## 2025-01-01 RX ADMIN — Medication 81 MILLIGRAM(S): at 12:03

## 2025-01-01 RX ADMIN — Medication 500000 UNIT(S): at 06:15

## 2025-01-01 RX ADMIN — MEROPENEM 100 MILLIGRAM(S): 1 INJECTION INTRAVENOUS at 06:33

## 2025-01-01 RX ADMIN — Medication 1 MILLIGRAM(S): at 15:04

## 2025-01-01 RX ADMIN — Medication 500000 UNIT(S): at 06:30

## 2025-01-01 RX ADMIN — Medication 500000 UNIT(S): at 06:22

## 2025-01-01 RX ADMIN — HEPARIN SODIUM 5000 UNIT(S): 1000 INJECTION INTRAVENOUS; SUBCUTANEOUS at 05:24

## 2025-01-01 RX ADMIN — Medication 250 MILLIGRAM(S): at 17:43

## 2025-01-01 RX ADMIN — Medication 40 MILLIGRAM(S): at 06:33

## 2025-01-01 RX ADMIN — Medication 250 MILLIGRAM(S): at 18:00

## 2025-01-01 RX ADMIN — CALCIUM GLUCONATE 200 GRAM(S): 20 INJECTION, SOLUTION INTRAVENOUS at 04:32

## 2025-01-01 RX ADMIN — MEROPENEM 100 MILLIGRAM(S): 1 INJECTION INTRAVENOUS at 23:21

## 2025-01-01 RX ADMIN — Medication 500000 UNIT(S): at 18:23

## 2025-01-01 RX ADMIN — Medication 25 MICROGRAM(S): at 15:47

## 2025-01-01 RX ADMIN — Medication 500000 UNIT(S): at 17:35

## 2025-01-01 RX ADMIN — Medication 40 MILLIGRAM(S): at 05:02

## 2025-01-01 RX ADMIN — METOPROLOL SUCCINATE 25 MILLIGRAM(S): 50 TABLET, EXTENDED RELEASE ORAL at 17:02

## 2025-01-01 RX ADMIN — ATORVASTATIN CALCIUM 10 MILLIGRAM(S): 80 TABLET, FILM COATED ORAL at 21:20

## 2025-01-01 RX ADMIN — Medication 81 MILLIGRAM(S): at 09:36

## 2025-01-01 RX ADMIN — INSULIN LISPRO 1: 100 INJECTION, SOLUTION INTRAVENOUS; SUBCUTANEOUS at 05:17

## 2025-01-01 RX ADMIN — SODIUM CHLORIDE 1000 MILLILITER(S): 9 INJECTION, SOLUTION INTRAVENOUS at 16:26

## 2025-01-01 RX ADMIN — SEVELAMER HYDROCHLORIDE 800 MILLIGRAM(S): 800 TABLET ORAL at 05:25

## 2025-01-01 RX ADMIN — Medication 100 MILLIGRAM(S): at 17:38

## 2025-01-01 RX ADMIN — Medication 650 MILLIGRAM(S): at 04:45

## 2025-01-01 RX ADMIN — Medication 500000 UNIT(S): at 17:36

## 2025-01-01 RX ADMIN — Medication 250 MILLIGRAM(S): at 00:57

## 2025-01-01 RX ADMIN — Medication 1 DROP(S): at 19:45

## 2025-01-01 RX ADMIN — Medication 1 DROP(S): at 08:13

## 2025-01-01 RX ADMIN — Medication 75 MILLIGRAM(S): at 21:21

## 2025-01-01 RX ADMIN — Medication 50 MILLIEQUIVALENT(S): at 18:48

## 2025-01-01 RX ADMIN — HEPARIN SODIUM 8 UNIT(S)/HR: 1000 INJECTION INTRAVENOUS; SUBCUTANEOUS at 20:04

## 2025-01-01 RX ADMIN — MEROPENEM 100 MILLIGRAM(S): 1 INJECTION INTRAVENOUS at 05:20

## 2025-01-01 RX ADMIN — PREDNISONE 15 MILLIGRAM(S): 20 TABLET ORAL at 06:52

## 2025-01-01 RX ADMIN — Medication 81 MILLIGRAM(S): at 21:11

## 2025-01-01 RX ADMIN — Medication 1 APPLICATION(S): at 06:27

## 2025-01-01 RX ADMIN — HEPARIN SODIUM 10 UNIT(S)/HR: 1000 INJECTION INTRAVENOUS; SUBCUTANEOUS at 07:24

## 2025-01-01 RX ADMIN — Medication 500000 UNIT(S): at 17:15

## 2025-01-01 RX ADMIN — Medication 250 MILLIGRAM(S): at 17:37

## 2025-01-01 RX ADMIN — PREDNISONE 15 MILLIGRAM(S): 20 TABLET ORAL at 06:15

## 2025-01-01 RX ADMIN — Medication 1 APPLICATION(S): at 21:31

## 2025-01-01 RX ADMIN — Medication 2 TABLET(S): at 21:45

## 2025-01-01 RX ADMIN — Medication 1 APPLICATION(S): at 11:07

## 2025-01-01 RX ADMIN — MEROPENEM 100 MILLIGRAM(S): 1 INJECTION INTRAVENOUS at 21:20

## 2025-01-01 RX ADMIN — ATORVASTATIN CALCIUM 10 MILLIGRAM(S): 80 TABLET, FILM COATED ORAL at 23:21

## 2025-01-01 RX ADMIN — SODIUM CHLORIDE 250 MILLILITER(S): 9 INJECTION, SOLUTION INTRAVENOUS at 17:20

## 2025-01-01 RX ADMIN — Medication 15 MILLILITER(S): at 17:35

## 2025-01-01 RX ADMIN — Medication 1 APPLICATION(S): at 12:10

## 2025-01-01 RX ADMIN — ATOVAQUONE 1500 MILLIGRAM(S): 750 SUSPENSION ORAL at 15:03

## 2025-01-01 RX ADMIN — TACROLIMUS 3 MILLIGRAM(S): 0.5 CAPSULE ORAL at 08:01

## 2025-01-01 RX ADMIN — ATORVASTATIN CALCIUM 10 MILLIGRAM(S): 80 TABLET, FILM COATED ORAL at 21:22

## 2025-01-01 RX ADMIN — Medication 500000 UNIT(S): at 01:02

## 2025-01-01 RX ADMIN — Medication 75 MILLIGRAM(S): at 21:11

## 2025-01-01 RX ADMIN — HALOPERIDOL 5 MILLIGRAM(S): 10 TABLET ORAL at 18:02

## 2025-01-01 RX ADMIN — TACROLIMUS 3 MILLIGRAM(S): 0.5 CAPSULE ORAL at 07:58

## 2025-01-01 RX ADMIN — CALCIUM GLUCONATE 200 GRAM(S): 20 INJECTION, SOLUTION INTRAVENOUS at 17:35

## 2025-01-01 RX ADMIN — MEROPENEM 100 MILLIGRAM(S): 1 INJECTION INTRAVENOUS at 05:15

## 2025-01-01 RX ADMIN — TACROLIMUS 3 MILLIGRAM(S): 0.5 CAPSULE ORAL at 19:24

## 2025-01-01 RX ADMIN — NOREPINEPHRINE BITARTRATE 8.5 MICROGRAM(S)/KG/MIN: 8 SOLUTION at 07:51

## 2025-01-01 RX ADMIN — Medication 1 APPLICATION(S): at 12:09

## 2025-01-01 RX ADMIN — ATORVASTATIN CALCIUM 10 MILLIGRAM(S): 80 TABLET, FILM COATED ORAL at 21:12

## 2025-01-01 RX ADMIN — SODIUM CHLORIDE 100 MILLILITER(S): 9 INJECTION, SOLUTION INTRAVENOUS at 18:44

## 2025-01-01 RX ADMIN — MIDODRINE HYDROCHLORIDE 5 MILLIGRAM(S): 5 TABLET ORAL at 12:59

## 2025-01-01 RX ADMIN — Medication 1 DROP(S): at 15:54

## 2025-01-01 RX ADMIN — SODIUM CHLORIDE 250 MILLILITER(S): 9 INJECTION, SOLUTION INTRAVENOUS at 01:35

## 2025-01-01 RX ADMIN — ATOVAQUONE 1500 MILLIGRAM(S): 750 SUSPENSION ORAL at 17:24

## 2025-01-01 RX ADMIN — Medication 50 MICROGRAM(S): at 13:35

## 2025-01-01 RX ADMIN — ATOVAQUONE 1500 MILLIGRAM(S): 750 SUSPENSION ORAL at 11:51

## 2025-01-01 RX ADMIN — DEXMEDETOMIDINE HYDROCHLORIDE IN SODIUM CHLORIDE 11.3 MICROGRAM(S)/KG/HR: 4 INJECTION INTRAVENOUS at 07:03

## 2025-01-01 RX ADMIN — Medication 10 MILLIGRAM(S): at 18:28

## 2025-01-01 RX ADMIN — ATOVAQUONE 1500 MILLIGRAM(S): 750 SUSPENSION ORAL at 12:22

## 2025-01-01 RX ADMIN — TACROLIMUS 0.8 MILLIGRAM(S): 0.5 CAPSULE ORAL at 19:31

## 2025-01-01 RX ADMIN — POLYETHYLENE GLYCOL 3350 17 GRAM(S): 17 POWDER, FOR SOLUTION ORAL at 12:10

## 2025-01-01 RX ADMIN — TACROLIMUS 0.8 MILLIGRAM(S): 0.5 CAPSULE ORAL at 10:17

## 2025-01-01 RX ADMIN — Medication 40 MILLIGRAM(S): at 18:39

## 2025-01-01 RX ADMIN — METOPROLOL SUCCINATE 200 MILLIGRAM(S): 50 TABLET, EXTENDED RELEASE ORAL at 06:32

## 2025-01-01 RX ADMIN — ATOVAQUONE 1500 MILLIGRAM(S): 750 SUSPENSION ORAL at 11:13

## 2025-01-01 RX ADMIN — IPRATROPIUM BROMIDE AND ALBUTEROL SULFATE 3 MILLILITER(S): .5; 2.5 SOLUTION RESPIRATORY (INHALATION) at 11:49

## 2025-01-01 RX ADMIN — Medication 200 GRAM(S): at 06:05

## 2025-01-01 RX ADMIN — HEPARIN SODIUM 7.5 UNIT(S)/HR: 1000 INJECTION INTRAVENOUS; SUBCUTANEOUS at 10:59

## 2025-01-01 RX ADMIN — Medication 500000 UNIT(S): at 11:07

## 2025-01-01 RX ADMIN — Medication 100 MILLIGRAM(S): at 17:44

## 2025-01-01 RX ADMIN — Medication 40 MILLIGRAM(S): at 06:19

## 2025-01-01 RX ADMIN — Medication 500000 UNIT(S): at 12:00

## 2025-01-01 RX ADMIN — Medication 5 UNIT(S): at 18:47

## 2025-01-01 RX ADMIN — DEXMEDETOMIDINE HYDROCHLORIDE IN SODIUM CHLORIDE 11.3 MICROGRAM(S)/KG/HR: 4 INJECTION INTRAVENOUS at 07:37

## 2025-01-01 RX ADMIN — INSULIN LISPRO 1: 100 INJECTION, SOLUTION INTRAVENOUS; SUBCUTANEOUS at 06:37

## 2025-01-01 RX ADMIN — METOPROLOL SUCCINATE 5 MILLIGRAM(S): 50 TABLET, EXTENDED RELEASE ORAL at 19:50

## 2025-01-01 RX ADMIN — ATORVASTATIN CALCIUM 10 MILLIGRAM(S): 80 TABLET, FILM COATED ORAL at 21:11

## 2025-01-01 RX ADMIN — INSULIN LISPRO 2: 100 INJECTION, SOLUTION INTRAVENOUS; SUBCUTANEOUS at 00:45

## 2025-01-01 RX ADMIN — Medication 25 MICROGRAM(S): at 05:05

## 2025-01-01 RX ADMIN — DEXMEDETOMIDINE HYDROCHLORIDE IN SODIUM CHLORIDE 11.3 MICROGRAM(S)/KG/HR: 4 INJECTION INTRAVENOUS at 20:04

## 2025-01-01 RX ADMIN — HEPARIN SODIUM 5000 UNIT(S): 1000 INJECTION INTRAVENOUS; SUBCUTANEOUS at 21:11

## 2025-01-01 RX ADMIN — MEROPENEM 100 MILLIGRAM(S): 1 INJECTION INTRAVENOUS at 21:11

## 2025-01-01 RX ADMIN — MEROPENEM 100 MILLIGRAM(S): 1 INJECTION INTRAVENOUS at 06:53

## 2025-01-01 RX ADMIN — Medication 500000 UNIT(S): at 11:48

## 2025-01-11 NOTE — ED PROVIDER NOTE - OBJECTIVE STATEMENT
65yo male with a pmh of PAF s/p ablation on xarelto, AICD, CHF. Left Hip replacement, presented with SOB for 1 day. Pt states this morning around 3am after waking up from a nightmare had sudden onset of sob which has worsened throughout the day. Pt states first with exertion now with just talking. Denies cp/palp, abd pain/n/v/d, urinary symptoms, recent travel. Pt with recent admission here for acute on chronic CHF.
No

## 2025-01-16 ENCOUNTER — RX RENEWAL (OUTPATIENT)
Age: 75
End: 2025-01-16

## 2025-02-03 ENCOUNTER — APPOINTMENT (OUTPATIENT)
Dept: CV DIAGNOSITCS | Facility: HOSPITAL | Age: 75
End: 2025-02-03

## 2025-02-03 ENCOUNTER — APPOINTMENT (OUTPATIENT)
Dept: HEART FAILURE | Facility: CLINIC | Age: 75
End: 2025-02-03

## 2025-02-04 ENCOUNTER — APPOINTMENT (OUTPATIENT)
Dept: RHEUMATOLOGY | Facility: CLINIC | Age: 75
End: 2025-02-04

## 2025-02-07 ENCOUNTER — RX RENEWAL (OUTPATIENT)
Age: 75
End: 2025-02-07

## 2025-02-10 ENCOUNTER — APPOINTMENT (OUTPATIENT)
Dept: HEART FAILURE | Facility: CLINIC | Age: 75
End: 2025-02-10

## 2025-02-10 ENCOUNTER — APPOINTMENT (OUTPATIENT)
Dept: CV DIAGNOSITCS | Facility: HOSPITAL | Age: 75
End: 2025-02-10

## 2025-02-11 ENCOUNTER — APPOINTMENT (OUTPATIENT)
Dept: HEART FAILURE | Facility: CLINIC | Age: 75
End: 2025-02-11

## 2025-02-11 NOTE — ED ADULT TRIAGE NOTE - ESI TRIAGE ACUITY LEVEL, MLM
Patient's goal A1c is < 7%.  Is pt at goal? No, 7.5% on 11.27.24  Patient's SMBGs are at goal.  Rationale for plan: Will plan to continue current therapy today as blood glucose readings at goal. Depending on labs/follow-up with PCP, additional Ozempic increase may be warranted in the future.    Medication Changes:  Continue:  Ozempic 0.5 mg subcutaneously once weekly.   Humalog Mix 75-25 - 38 units subcutaneously BID    Future Considerations:  Titrate GLP1  Start SGLT2i  Consider ACEi for renoprotective properties  Decrease insulin dosage    Monitoring and Education:  Ozempic Education:   Counseled patient on Ozempic MOA, expectations, side effects, duration of therapy, administration, and monitoring parameters.  Provided detailed dosing and administration counseling to ensure proper technique.   Reviewed Ozempic titration schedule, starting with 0.25 mg once weekly for 4 weeks, then continuing on 0.5 mg once weekly. Pt verbalized understanding.  Counseled patient on the benefits of GLP-1ra, such as cardiovascular risk reduction, glycemic control, and weight loss potential.  Reviewed storage requirements of Ozempic when not in use, and when to administer the medication if a dose is missed.  Advised patient that they may experience improved satiety after meals and portion sizes of meals may be reduced as doses of Ozempic increase.  Counseled patient to avoid foods that are fatty/oily as this may precipitate the nausea/GI upset that may occur with new start Ozempic.   
3

## 2025-02-12 ENCOUNTER — RESULT REVIEW (OUTPATIENT)
Age: 75
End: 2025-02-12

## 2025-02-12 ENCOUNTER — OUTPATIENT (OUTPATIENT)
Dept: OUTPATIENT SERVICES | Facility: HOSPITAL | Age: 75
LOS: 1 days | End: 2025-02-12
Payer: MEDICARE

## 2025-02-12 ENCOUNTER — APPOINTMENT (OUTPATIENT)
Dept: CV DIAGNOSITCS | Facility: HOSPITAL | Age: 75
End: 2025-02-12

## 2025-02-12 ENCOUNTER — APPOINTMENT (OUTPATIENT)
Dept: HEART FAILURE | Facility: CLINIC | Age: 75
End: 2025-02-12
Payer: MEDICARE

## 2025-02-12 VITALS
SYSTOLIC BLOOD PRESSURE: 110 MMHG | WEIGHT: 178 LBS | DIASTOLIC BLOOD PRESSURE: 75 MMHG | HEIGHT: 68 IN | BODY MASS INDEX: 26.98 KG/M2 | TEMPERATURE: 98.4 F | HEART RATE: 85 BPM | OXYGEN SATURATION: 98 %

## 2025-02-12 DIAGNOSIS — Z94.1 HEART TRANSPLANT STATUS: ICD-10-CM

## 2025-02-12 DIAGNOSIS — Z94.1 HEART TRANSPLANT STATUS: Chronic | ICD-10-CM

## 2025-02-12 PROCEDURE — 93306 TTE W/DOPPLER COMPLETE: CPT | Mod: 26

## 2025-02-12 PROCEDURE — 99214 OFFICE O/P EST MOD 30 MIN: CPT

## 2025-02-12 PROCEDURE — 93356 MYOCRD STRAIN IMG SPCKL TRCK: CPT

## 2025-02-12 PROCEDURE — 93306 TTE W/DOPPLER COMPLETE: CPT

## 2025-02-12 PROCEDURE — 93000 ELECTROCARDIOGRAM COMPLETE: CPT

## 2025-02-14 ENCOUNTER — NON-APPOINTMENT (OUTPATIENT)
Age: 75
End: 2025-02-14

## 2025-02-17 NOTE — PROGRESS NOTE ADULT - PROBLEM SELECTOR PROBLEM 3
I have sent a msg to patient with the following interpretation (see below):   Atrial fibrillation and flutter

## 2025-02-18 ENCOUNTER — LABORATORY RESULT (OUTPATIENT)
Age: 75
End: 2025-02-18

## 2025-02-18 ENCOUNTER — NON-APPOINTMENT (OUTPATIENT)
Age: 75
End: 2025-02-18

## 2025-02-18 ENCOUNTER — RESULT REVIEW (OUTPATIENT)
Age: 75
End: 2025-02-18

## 2025-02-18 ENCOUNTER — APPOINTMENT (OUTPATIENT)
Dept: HEMATOLOGY ONCOLOGY | Facility: CLINIC | Age: 75
End: 2025-02-18

## 2025-02-18 VITALS
HEART RATE: 84 BPM | OXYGEN SATURATION: 93 % | TEMPERATURE: 97.3 F | DIASTOLIC BLOOD PRESSURE: 91 MMHG | SYSTOLIC BLOOD PRESSURE: 128 MMHG | RESPIRATION RATE: 16 BRPM | BODY MASS INDEX: 26.58 KG/M2 | WEIGHT: 174.8 LBS

## 2025-02-18 DIAGNOSIS — D59.9 ACQUIRED HEMOLYTIC ANEMIA, UNSPECIFIED: ICD-10-CM

## 2025-02-18 PROCEDURE — 99213 OFFICE O/P EST LOW 20 MIN: CPT

## 2025-02-19 NOTE — ED ADULT NURSE REASSESSMENT NOTE - NS ED NURSE REASSESS COMMENT FT1
PT straight catheterized under sterile technique with 2 RN's at the bedside as per MD order. PT tolerated well. Approximately 100 mL clear, yellow urine drained. UA/UC sent as per MD order. Safety and comfort maintained. Call bell within reach.

## 2025-02-19 NOTE — ED PROVIDER NOTE - PROGRESS NOTE DETAILS
Zach Herrera DO (PGY3)  Received signout on this patient, 74-year-old male past with history of HTN A-fib on Eliquis, heart cath in 2018, hypertension, hyperlipidemia presenting with change in altered mental status.  Patient was found to be hypothermic now on a Celina hugger temperature of 91.  Hyperkalemic to 6.7 shifted as well as elevated creatinine kindness, proBNP and creatinine of 1.92.  Patient's repeat labs were drawn which showed a potassium of 6.2.  Will give more insulin as patient's glucose is 266.  Pending CT reads.  Nephrology was consulted as well as transplant surgery.  Patient offering no acute complaints at this time.  Patient will need admission Zach Herrera DO (PGY3)  Patient evaluated by nephrology.  Patient was shifted again for potassium 6.1.  No EKG changes.  CT is negative for acute findings.  Will admit for further management workup for altered mental status.  Patient's hypothermia improved and is now 98.

## 2025-02-19 NOTE — ED PROVIDER NOTE - CLINICAL SUMMARY MEDICAL DECISION MAKING FREE TEXT BOX
This is a 74-year-old male with history as above presenting for altered mental status.  Vitals with hypothermia, exam as above.  Differential diagnosis includes acute intracranial hemorrhage, ischemic stroke but based on known last known well, patient is outside of code stroke window, however CT expedited regardless.  Additionally, with his hypothermia sepsis is high on differential.  Cardiogenic shock given his history of heart transplant is also consideration, although his pressure is normal so this is less likely.  Will give broad-spectrum antibiotics, order labs to evaluate for source of infection, obtain CT head and CTA.

## 2025-02-19 NOTE — ED PROVIDER NOTE - OBJECTIVE STATEMENT
This is a 74-year-old male with history of atrial fibrillation on Eliquis, heart transplant in 2018 was on LVAD prior, hypertension, hyperlipidemia, SVT presenting for altered mental status.  Per EMS, patient's brother found patient today dizzy, not acting like himself, with slurred speech.  Patient reports the last time he was normal for EMS was noon yesterday.  When asked upon my evaluation, he states he has been dizzy for a week.  He denies fever, chills, chest pain although he does report some shortness of breath.  He denies abdominal pain, dysuria, cough, any other symptoms.

## 2025-02-19 NOTE — ED ADULT NURSE REASSESSMENT NOTE - NS ED NURSE REASSESS COMMENT FT1
Confirmed with Eleanor in the [facility] room that PT is receiving continuous cardiac monitoring and continuous pulse oximetry monitoring and to notify RN of oxygen saturations of less than 92%. Confirmed with Eleanor in the [facility] room that PT is receiving continuous cardiac monitoring and continuous pulse oximetry monitoring and to notify RN of oxygen saturations of less than 90%.

## 2025-02-19 NOTE — CONSULT NOTE ADULT - PROBLEM SELECTOR RECOMMENDATION 9
Pt came in with pH-7.17, pCO2-62, HCO3-23, consistent with respiratory acidosis that improved to 7.28/48/23. He has HCO3 level of 21 that decreased to 19 on BMP. Continue to monitor. abd pressure. increased urination. dysuria

## 2025-02-19 NOTE — CONSULT NOTE ADULT - ASSESSMENT
74 M with CKD, hyperkalemia and mild acidosis.  72yo M pmhx HTN, HLD, nonischemic cardiomyopathy, chronic systolic heart failure s/p HM2 LVAD (6/2017), s/p heart transplant from Hep. 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 Pt here with change in MS, hyperkalemia and juan f.   Patient was given Calcium Gluconate 2g, D50/Insulin 5 unit, 40 mg IV lasix, Lokelma 10mg.   Vancomycin and Zosyn

## 2025-02-19 NOTE — CONSULT NOTE ADULT - PROBLEM SELECTOR RECOMMENDATION 2
Pt with K level of 6.7 that improved to 6.1 after medical management. Can check repeat level in few hrs and continue to monitor. Pt with K level of 6.7 that improved to 6.1 after medical management with additional dose of insulin given. Check repeat levels and continue to monitor.

## 2025-02-19 NOTE — ED ADULT NURSE NOTE - ED CARDIAC RHYTHM
INDICATION:

PAIN IN RIGHT UPPER ARM



COMPARISON:

None.



TECHNIQUE:

AP and lateral views of the right humerus.



FINDINGS:

Examination appears relatively age-appropriate.  No acute fracture or dislocation.

Surrounding soft tissues are normal.



IMPRESSION:

Age-appropriate right humerus radiographs..





<Electronically signed by Arnie Gay > 03/10/21 0991 regular

## 2025-02-19 NOTE — ED PROVIDER NOTE - ATTENDING CONTRIBUTION TO CARE
I have personally performed a face to face medical and diagnostic evaluation of the patient. I have discussed with and reviewed the Resident's and/or ACP's and/or Medical/PA/NP student's note and agree with the History, ROS, Physical Exam and MDM unless otherwise indicated. A brief summary of my personal evaluation and impression can be found below.     74-year-old male past medical history A-fib on Eliquis, heart transplant 2018 status post LVAD,  hypertension, hyperlipidemia, SVT presenting to the emergency department for about 2 days of AMS associated with    This is a 74-year-old male with history of atrial fibrillation on Eliquis, heart transplant in 2018 was on LVAD prior, hypertension, hyperlipidemia, SVT presenting for altered mental status.  Per EMS, patient's brother found patient today dizzy, not acting like himself, with slurred speech.  Patient reports the last time he was normal for EMS was noon yesterday.  When asked upon my evaluation, he states he has been dizzy for a week.  He denies fever, chills, chest pain although he does report some shortness of breath.  He denies abdominal pain, dysuria, cough, any other symptoms. I have personally performed a face to face medical and diagnostic evaluation of the patient. I have discussed with and reviewed the Resident's and/or ACP's and/or Medical/PA/NP student's note and agree with the History, ROS, Physical Exam and MDM unless otherwise indicated. A brief summary of my personal evaluation and impression can be found below.     74-year-old male past medical history A-fib on Eliis, heart transplant 2018 status post LVAD,  hypertension, hyperlipidemia, SVT presenting to the emergency department for about 2 days of AMS associated with slow to speak, dizziness and abnormal gait, last known well noon yesterday.   patient endorsing shortness of breath, no chest pain cough or fever. patient denies trauma or fall. Denies nausea or vomiting.  Patient is currently ANO x 2-3 depending on the question (patient knows month but not year).       Physical exam shows patient hypothermic rectal temp 91.  Normotensive, no tachycardia, O2 sat within normal limits on room air.  Regular rate and rhythm, lungs clear to auscultation.  No focal neurodeficits, pupils equal round active to light, extraocular movements intact.  Patient with 2+ pitting edema bilateral lower extremities, distal pulses intact.   Abdomen soft and nontender.  Given history and physical differential includes is not limited to sepsis, CHF, NORMAN, metabolic derangement, dehydration, anemia.  more likely metabolic encephalopathy but will evaluate further for neurologic pathology with CT.  Plan for EKG, labs, x-ray, UA, cardiac monitor, CT, antibiotics, transplant recs, admission.

## 2025-02-19 NOTE — ED ADULT NURSE REASSESSMENT NOTE - NS ED NURSE REASSESS COMMENT FT1
Temp sensing indwelling urinary catheter placed using aseptic technique. Sterile equipment utilized. Second RN Abigail present to confirm sterility. Draining to gravity - initial output of 50ml. Secured w/ stat lock to upper leg. Explained procedure as it was being done - Pt tolerated procedure well. Comfort and safety provided, temperature 91.4F

## 2025-02-19 NOTE — ED ADULT NURSE NOTE - NSFALLHARMRISKINTERV_ED_ALL_ED
Assistance OOB with selected safe patient handling equipment if applicable/Assistance with ambulation/Communicate risk of Fall with Harm to all staff, patient, and family/Encourage patient to sit up slowly, dangle for a short time, stand at bedside before walking/Monitor gait and stability/Orthostatic vital signs/Provide patient with walking aids/Provide visual cue: red socks, yellow wristband, yellow gown, etc/Reinforce activity limits and safety measures with patient and family/Bed in lowest position, wheels locked, appropriate side rails in place/Call bell, personal items and telephone in reach/Instruct patient to call for assistance before getting out of bed/chair/stretcher/Non-slip footwear applied when patient is off stretcher/[place] to call system/Physically safe environment - no spills, clutter or unnecessary equipment/Purposeful Proactive Rounding/Room/bathroom lighting operational, light cord in reach Regular rate & rhythm, normal S1, S2; no murmurs, gallops or rubs; no S3, S4 negative

## 2025-02-19 NOTE — CONSULT NOTE ADULT - SUBJECTIVE AND OBJECTIVE BOX
[facility] DIVISION OF KIDNEY DISEASES AND HYPERTENSION -- [phone]  -- INITIAL CONSULT NOTE  --------------------------------------------------------------------------------  HPI: 74 M with hx of CKD, Afib on Eliquis, heart transplant in 2018, HTN, HLD presented with AMS, slurred speech, unsteady gait. Nephrology consulted for hyperkalemia and acidosis.     Upon HIE review, Scr ranged from 1.3-2.1 in 2023. Most recent Scr was 1.75 on 1/30/24. On admission, Scr was 1.9.    Pt states he noticed difficulty talking that started yesterday and says had difficulty walking for a few weeks. Pt was AOx2, but slightly confused and was poor historian, could not obtain much history from patient. He did state that he had some difficulty breathing earlier, but currently is stable. Pt currently denies any chest pain, SOB, abdominal pain, diarrhea or constipation.         PAST HISTORY  --------------------------------------------------------------------------------  PAST MEDICAL & SURGICAL HISTORY:  HTN      SVT (Supraventricular Tachycardia)      Non-Ischemic Cardiomyopathy  now s/p transplant 2018      GIB (gastrointestinal bleeding)      Hepatitis C virus      DVT of upper extremity (deep vein thrombosis)      Former smoker      HLD (hyperlipidemia)      Knee pain, right      H/O autoimmune hemolytic anemia      H/O hemolytic anemia      Status post left hip replacement      H/O heart transplant  2/2018        FAMILY HISTORY:  No pertinent family history in first degree relatives      PAST SOCIAL HISTORY: Non-contributory    ALLERGIES & MEDICATIONS  --------------------------------------------------------------------------------  Allergies    No Known Allergies    Intolerances      Standing Inpatient Medications    PRN Inpatient Medications      REVIEW OF SYSTEMS  --------------------------------------------------------------------------------  Gen: No fevers/chills  Head/Eyes/Ears: Normal hearing,   Respiratory: No dyspnea, cough  CV: No chest pain  GI: No abdominal pain, diarrhea  : No dysuria, hematuria  MSK: No  edema    All other systems were reviewed and are negative, except as noted.    VITALS/PHYSICAL EXAM  --------------------------------------------------------------------------------  T(C): 36.3 (02-19-25 @ 23:20), Max: 36.3 (02-19-25 @ 23:20)  HR: 94 (02-19-25 @ 23:20) (70 - 94)  BP: 122/71 (02-19-25 @ 23:20) (111/64 - 133/84)  RR: 20 (02-19-25 @ 23:20) (20 - 20)  SpO2: 99% (02-19-25 @ 23:20) (96% - 100%)  Wt(kg): --  Height (cm): 182.9 (02-19-25 @ 16:40)  Weight (kg): 90.7 (02-19-25 @ 16:40)  BMI (kg/m2): 27.1 (02-19-25 @ 16:40)  BSA (m2): 2.13 (02-19-25 @ 16:40)      Physical Exam:  	Gen: NAD  	Pulm: CTA B/L  	CV: S1S2  	Abd: Soft, +BS   	Ext: No LE edema B/L  	Neuro: Awake  	Skin: Warm and dry    LABS/STUDIES  --------------------------------------------------------------------------------              13.4   19.67 >-----------<  202      [02-19-25 @ 17:29]              43.4     135  |  101  |  69  ----------------------------<  161      [02-19-25 @ 22:56]  6.1   |  19  |  1.85        Ca     9.2     [02-19-25 @ 22:56]    TPro  6.1  /  Alb  3.2  /  TBili  1.2  /  DBili  x   /  AST  68  /  ALT  25  /  AlkPhos  54  [02-19-25 @ 22:56]    PT/INR: PT 20.2 , INR 1.77       [02-19-25 @ 17:29]  PTT: 39.3       [02-19-25 @ 17:29]      Creatinine Trend:  SCr 1.85 [02-19 @ 22:56]  SCr 1.35 [02-19 @ 20:07]  SCr 1.77 [02-19 @ 19:40]  SCr 1.92 [02-19 @ 17:29]    Urinalysis - [02-19-25 @ 22:56]      Color  / Appearance  / SG  / pH       Gluc 161 / Ketone   / Bili  / Urobili        Blood  / Protein  / Leuk Est  / Nitrite       RBC  / WBC  / Hyaline  / Gran  / Sq Epi  / Non Sq Epi  / Bacteria         HIV Nonreact      [05-02-20 @ 11:28]    MIGUELINA: titer Negative, pattern --      [05-23-20 @ 12:48]  Rheumatoid Factor <10      [05-23-20 @ 12:21]  ANCA: cANCA Negative, pANCA Negative, atypical ANCA Indeterminate      [05-23-20 @ 12:48]  Cryoglobulin: Negative      [01-08-23 @ 08:22]   [facility] DIVISION OF KIDNEY DISEASES AND HYPERTENSION -- [phone]  -- INITIAL CONSULT NOTE  --------------------------------------------------------------------------------  HPI: 74 M with hx of CKD, Afib on Eliquis, heart transplant in 2018, HTN, HLD presented with AMS, slurred speech, unsteady gait. Nephrology consulted for hyperkalemia and acidosis.   Upon HIE review, Scr ranged from 1.3-2.1 in 2023. Most recent Scr was 1.75 on 1/30/24. On admission, Scr was 1.9.  Pt states he noticed difficulty talking that started yesterday and says had difficulty walking for a few weeks. Pt was AOx2, but slightly confused and was poor historian, could not obtain much history from patient. He did state that he had some difficulty breathing earlier, but currently is stable. Pt currently denies any chest pain, SOB, abdominal pain, diarrhea or constipation.         PAST HISTORY  --------------------------------------------------------------------------------  PAST MEDICAL & SURGICAL HISTORY:  HTN      SVT (Supraventricular Tachycardia)      Non-Ischemic Cardiomyopathy  now s/p transplant 2018      GIB (gastrointestinal bleeding)      Hepatitis C virus      DVT of upper extremity (deep vein thrombosis)      Former smoker      HLD (hyperlipidemia)      Knee pain, right      H/O autoimmune hemolytic anemia      H/O hemolytic anemia      Status post left hip replacement      H/O heart transplant  2/2018        FAMILY HISTORY:  No pertinent family history in first degree relatives    Outpatient meds      · 	predniSONE 10 mg oral tablet: 1.5 tab(s) orally once a day  · 	apixaban 5 mg oral tablet: 1 tab(s) orally every 12 hours  · 	sulfamethoxazole-trimethoprim 400 mg-80 mg oral tablet: 1 tab(s) orally 3 times a week Monday/Wednesday/Friday  · 	metoprolol succinate 100 mg oral tablet, extended release: 2 tab(s) orally once a day  · 	valGANciclovir 450 mg oral tablet: 1 tab(s) orally 2 times a week Monday and Thursday  · 	aspirin 81 mg oral tablet, chewable: 1 tab(s) orally once a day  · 	nystatin 100,000 units/mL oral suspension: 5 milliliter(s) orally 4 times a day  · 	bumetanide 1 mg oral tablet: 0.5 tab(s) orally once a day  · 	latanoprost 0.005% ophthalmic solution: 1 gram(s) in each eye once a day (at bedtime)  · 	Vitamin D3 25 mcg (1000 intl units) oral tablet: 1 tab(s) orally once a day  · 	NovoLOG FlexPen 100 units/mL injectable solution: 2 unit(s) injectable once a day before lunch  · 	Prolia 60 mg/mL subcutaneous solution: 60 milligram(s) subcutaneously every 6 months  · 	tacrolimus 1 mg oral capsule: 2 cap(s) orally 2 times a day Tacrolimus 3mg twice daily  · 	pravastatin 20 mg oral tablet: 1 orally once a day  · 	patiromer 25.2 g oral powder for reconstitution: 25.2 gram(s) orally once a day  · 	losartan 25 mg oral tablet: 1 tab(s) orally once a day      PAST SOCIAL HISTORY: Non-contributory    ALLERGIES & MEDICATIONS  --------------------------------------------------------------------------------  Allergies    No Known Allergies    Intolerances      Standing Inpatient Medications    PRN Inpatient Medications      REVIEW OF SYSTEMS  --------------------------------------------------------------------------------  Gen: No fevers/chills  Head/Eyes/Ears: Normal hearing,   Respiratory: No dyspnea, cough  CV: No chest pain  GI: No abdominal pain, diarrhea  : No dysuria, hematuria  MSK: No  edema    All other systems were reviewed and are negative, except as noted.    VITALS/PHYSICAL EXAM  --------------------------------------------------------------------------------  T(C): 36.3 (02-19-25 @ 23:20), Max: 36.3 (02-19-25 @ 23:20)  HR: 94 (02-19-25 @ 23:20) (70 - 94)  BP: 122/71 (02-19-25 @ 23:20) (111/64 - 133/84)  RR: 20 (02-19-25 @ 23:20) (20 - 20)  SpO2: 99% (02-19-25 @ 23:20) (96% - 100%)  Wt(kg): --  Height (cm): 182.9 (02-19-25 @ 16:40)  Weight (kg): 90.7 (02-19-25 @ 16:40)  BMI (kg/m2): 27.1 (02-19-25 @ 16:40)  BSA (m2): 2.13 (02-19-25 @ 16:40)      Physical Exam:  	Gen: NAD  	Pulm: CTA B/L  	CV: S1S2  	Abd: Soft, +BS   	Ext: No LE edema B/L  	Neuro: Awake  	Skin: Warm and dry    LABS/STUDIES  --------------------------------------------------------------------------------              13.4   19.67 >-----------<  202      [02-19-25 @ 17:29]              43.4     135  |  101  |  69  ----------------------------<  161      [02-19-25 @ 22:56]  6.1   |  19  |  1.85        Ca     9.2     [02-19-25 @ 22:56]    TPro  6.1  /  Alb  3.2  /  TBili  1.2  /  DBili  x   /  AST  68  /  ALT  25  /  AlkPhos  54  [02-19-25 @ 22:56]    PT/INR: PT 20.2 , INR 1.77       [02-19-25 @ 17:29]  PTT: 39.3       [02-19-25 @ 17:29]      Creatinine Trend:  SCr 1.85 [02-19 @ 22:56]  SCr 1.35 [02-19 @ 20:07]  SCr 1.77 [02-19 @ 19:40]  SCr 1.92 [02-19 @ 17:29]    Urinalysis - [02-19-25 @ 22:56]      Color  / Appearance  / SG  / pH       Gluc 161 / Ketone   / Bili  / Urobili        Blood  / Protein  / Leuk Est  / Nitrite       RBC  / WBC  / Hyaline  / Gran  / Sq Epi  / Non Sq Epi  / Bacteria         HIV Nonreact      [05-02-20 @ 11:28]    MIGUELINA: titer Negative, pattern --      [05-23-20 @ 12:48]  Rheumatoid Factor <10      [05-23-20 @ 12:21]  ANCA: cANCA Negative, pANCA Negative, atypical ANCA Indeterminate      [05-23-20 @ 12:48]  Cryoglobulin: Negative      [01-08-23 @ 08:22]

## 2025-02-19 NOTE — ED ADULT NURSE NOTE - OBJECTIVE STATEMENT
PT is a 74 year old A&OX4 at baseline, currently confused, male with PMH of atrial fibrillation on Eliquis, heart transplant in 2018 was on LVAD prior, hypertension, hyperlipidemia, and SVT who presents to the ED from home via EMS with c/o AMS. Per EMS, PT's brother found PT today at 14:00 with unsteady gait, dizziness, not acting like himself, with slurred speech. PT reports the last time he was normal for EMS was noon yesterday. When asked upon my evaluation, he states he has been dizzy for a week.  PT also endorsing some SOB. PT denies chest pain, N/V/D, fevers, chills, cough, blurry vision, and urinary symptoms. PT is resting comfortably in bed, breathing unlabored on room air, and answering questions for the most part but eyes remain closed and speech is slurred. Abdomen is soft, non-tender, and non-distended. Skin is cold and dry, no diaphoresis noted. 2+ pitting edema noted to B/L extremities. PT moving all extremities spontaneously with equal strength, sensation intact. IV access established by US by MD Mckeon. PT placed in hospital gown. EKG completed, PT placed on cardiac monitor. Safety and comfort maintained.

## 2025-02-19 NOTE — ED PROVIDER NOTE - CARE PLAN
1 Principal Discharge DX:	Hyperkalemia  Secondary Diagnosis:	Hypothermia  Secondary Diagnosis:	Sepsis

## 2025-02-19 NOTE — ED ADULT NURSE REASSESSMENT NOTE - NS ED NURSE REASSESS COMMENT FT1
Assumed care from Marissa RN, pt resting comfortably in stretcher AxO2, follows commands, denies pain, able to move all extremities, neuro exam grossly intact, NSR on the monitor 79, rectal temp 91.4, pt on bear sarahi, ED resident at bedside for ultrasound I

## 2025-02-19 NOTE — ED PROVIDER NOTE - PHYSICAL EXAMINATION
Physical Exam:  General: fatigued but interactive  Eyes: EOMI, Conjunctiva and sclera clear  Lungs: decreased lung sounds to right base, no crackles  Heart: Normal S1, S2, no murmurs  Abdomen: Soft, nontender, nondistended, no CVA tenderness  Extremities: 2+ bilateral pulses, 2+ pitting edema bilaterally  Neurologic: Cranial nerves remarkable for slurred speech, EOMI, PERRL, symmetric facial movement.  Full strength of all 4 extremities.  Patient is fatigued appearing but does open eyes and answer questions to voice.

## 2025-02-20 ENCOUNTER — LABORATORY RESULT (OUTPATIENT)
Age: 75
End: 2025-02-20

## 2025-02-20 NOTE — CONSULT NOTE ADULT - SUBJECTIVE AND OBJECTIVE BOX
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 Patient is unable to provide history due to confusion, history obtained from patient's brother Nawaf Barahona ([phone]). Patient was noted yesterday to have difficulties with his balance. He generally walks well with a cane, however yesterday he when he tried to stand up he fell back into his chair. He then continued to have some difficulty with walking throughout the day and reported that his legs felt heavy. Patient's brother also noticed that he was more quiet than usual and seemed confused, thus called EMS to bring him to the hospital for evaluation. At baseline, patient is AAOx2-3. Brother reports that patient has memory problems as well as occasional episodes of confusion. He requires assistance with ADL/iADLs.    In the ED, patient noted to be hypothermic, normotensive. Labs notable for leukocytosis 22k, hyperkalemia, NORMAN, acidemia, CK elevated >2000. CXR and UA were negative for infection, blood cultures in process. Patient was started on empiric vanc/zosyn.    Upon neurology team assessment, patient is extremely limited historian however denies fever, headache, nausea/vomiting, generalized pain or leg weakness. Denies alcohol, drug use.       Review of Systems:     CONSTITUTIONAL: No fevers or chills  EYES AND ENT: No visual changes or no throat pain   NECK: No pain or stiffness  RESPIRATORY: No hemoptysis or shortness of breath  CARDIOVASCULAR: No chest pain or palpitations  GASTROINTESTINAL: No melena or hematochezia  GENITOURINARY: No dysuria or hematuria  NEUROLOGICAL: +As stated in HPI above  SKIN: No itching, burning, rashes, or lesions   All other review of systems is negative unless indicated above.    Allergies:  No Known Allergies      PMHx/PSHx/Family Hx: As above, otherwise see below   CHF (Congestive Heart Failure)    HTN    SVT (Supraventricular Tachycardia)    Non-Ischemic Cardiomyopathy    PAF (paroxysmal atrial fibrillation)    Ventricular fibrillation    H/O prior ablation treatment    GIB (gastrointestinal bleeding)    Hepatitis C virus    DVT of upper extremity (deep vein thrombosis)    Former smoker    HLD (hyperlipidemia)    Knee pain, right    H/O autoimmune hemolytic anemia    H/O hemolytic anemia    Atrial fibrillation        Social Hx:  No current use of tobacco, alcohol, or illicit drugs  Lives with brothers    Medications:  MEDICATIONS  (STANDING):  aspirin  chewable 81 milliGRAM(s) Oral daily  atorvastatin 10 milliGRAM(s) Oral at bedtime  cholecalciferol 1000 Unit(s) Oral daily  dextrose 5%. 1000 milliLiter(s) (100 mL/Hr) IV Continuous <Continuous>  dextrose 5%. 1000 milliLiter(s) (50 mL/Hr) IV Continuous <Continuous>  dextrose 50% Injectable 25 Gram(s) IV Push once  dextrose 50% Injectable 12.5 Gram(s) IV Push once  dextrose 50% Injectable 25 Gram(s) IV Push once  glucagon  Injectable 1 milliGRAM(s) IntraMuscular once  insulin lispro (ADMELOG) corrective regimen sliding scale   SubCutaneous three times a day before meals  insulin lispro (ADMELOG) corrective regimen sliding scale   SubCutaneous at bedtime  phytonadione  IVPB 5 milliGRAM(s) IV Intermittent once  piperacillin/tazobactam IVPB.- 3.375 Gram(s) IV Intermittent once  piperacillin/tazobactam IVPB.. 3.375 Gram(s) IV Intermittent every 8 hours  predniSONE   Tablet 15 milliGRAM(s) Oral daily  sodium zirconium cyclosilicate 10 Gram(s) Oral three times a day    MEDICATIONS  (PRN):  acetaminophen     Tablet .. 650 milliGRAM(s) Oral every 6 hours PRN Temp greater or equal to 38C (100.4F), Mild Pain (1 - 3)  dextrose Oral Gel 15 Gram(s) Oral once PRN Blood Glucose LESS THAN 70 milliGRAM(s)/deciliter  melatonin 3 milliGRAM(s) Oral at bedtime PRN Insomnia      Vitals:  T(C): 36.3 (02-20-25 @ 15:16), Max: 36.7 (02-20-25 @ 02:22)  HR: 83 (02-20-25 @ 15:16) (56 - 100)  BP: 92/67 (02-20-25 @ 15:16) (92/67 - 150/70)  RR: 18 (02-20-25 @ 15:16) (18 - 20)  SpO2: 95% (02-20-25 @ 15:16) (95% - 100%)    Physical Examination:   General - NAD   HEENT- Bilateral conjunctival erythema. Neck ROM is full.  Extremities- Significant RUE edema. Severe arthritis in bilateral hands with deformity.     Neurologic Exam:  Mental status - Awake and alert. Oriented to self only. When given multiple choice says the year is "2005". Speech is moderately dysarthric. Naming and repetition are intact. Speech is perseverative, loses train of thought. Follows simple commands, has difficulty with complex commands. Says president is "Timothy".    Cranial nerves:  CN II: Visual fields are full to confrontation. Pupils are 4 mm and briskly reactive to light.   CN III, IV, VI: EOMI, no nystagmus, no ptosis  CN V: Facial sensation is intact to pinprick in all 3 divisions bilaterally.  CN VII: Face is symmetric with normal eye closure and smile.  CN VII: Hearing is normal to rubbing fingers  CN IX, X: Palate elevates symmetrically. Phonation is normal.  CN XI: Head turning and shoulder shrug are intact  CN XII: Tongue is midline with normal movements and no atrophy.    Motor - Normal bulk and tone throughout. No pronator drift of out-stretched arms.  Strength testing            Deltoid      Biceps      Triceps     Wrist Extension    Wrist Flexion     Interossei         R            5                 5               5                     5                  5                   5                 5  L             5                 5               5                     5                  5                   5                 5              Hip Flexion    Hip Extension    Knee Flexion    Knee Extension    Dorsiflexion    Plantar Flexion  R              5                           5                 5                  5                            Didn't participate in exam  L              5                           5                  5                  5                            Didn't participate in exam    Sensation - Light touch in touch throughout     DTR's -             Biceps      Triceps     Brachioradialis      Patellar    Ankle    Toes/plantar response  R             1+             1+             1+                    1+         1+                 Down  L              1+             1+             1+                    1+          1+                 Down    Coordination - No dysmetria on finger to nose    Gait and station - Deferred due to fall risk    Labs:                        12.7   21.64 )-----------( 195      ( 20 Feb 2025 05:35 )             39.8     02-20    134[L]  |  102  |  64[H]  ----------------------------<  389[H]  5.6[H]   |  16[L]  |  2.02[H]    Ca    8.6      20 Feb 2025 05:35  Phos  4.8     02-20  Mg     1.9     02-20    TPro  6.1  /  Alb  3.2[L]  /  TBili  1.2  /  DBili  x   /  AST  68[H]  /  ALT  25  /  AlkPhos  54  02-19    CAPILLARY BLOOD GLUCOSE      POCT Blood Glucose.: 96 mg/dL (20 Feb 2025 12:24)    LIVER FUNCTIONS - ( 19 Feb 2025 22:56 )  Alb: 3.2 g/dL / Pro: 6.1 g/dL / ALK PHOS: 54 U/L / ALT: 25 U/L / AST: 68 U/L / GGT: x             PT/INR - ( 19 Feb 2025 17:29 )   PT: 20.2 sec;   INR: 1.77 ratio         PTT - ( 19 Feb 2025 17:29 )  PTT:39.3 sec    Radiology:  CT Head No Cont:  (19 Feb 2025 18:43)  < from: CT Head No Cont (02.19.25 @ 18:43) >  IMPRESSION:  1. No acute intracranial hemorrhage, mass effect, or midline shift. If   alteredmental status persists, consider further evaluation via MR   imaging to include DWI and ADC mapping techniques, provided there are no   contraindications.  2. 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 These findings were present on the prior study dated 1/25/2024.    < end of copied text >  < from: CT Angio Head w/ IV Cont (02.19.25 @ 18:43) >  IMPRESSION:  (Limited diagnostic study secondary to suboptimal opacification of the   intracranial arterial vasculature).  CTA NECK:  No evidence of significant stenosis or occlusion.    CTA HEAD:  No large vessel occlusion, significant stenosis or vascular abnormality   identified.    < end of copied text >

## 2025-02-20 NOTE — H&P ADULT - NSHPPHYSICALEXAM_GEN_ALL_CORE
GENERAL: NAD, lying in bed comfortably  HEAD: Atraumatic, normocephalic  EYES: EOMI, PERRLA, conjunctiva and sclera clear  ENT: Moist mucous membranes  NECK: Supple, no JVD, no thyroidmegaly   HEART: S1, S2, Regular rate and rhythm, no murmurs, rubs, or gallops  LUNGS: Unlabored respirations, clear to auscultation bilaterally, no crackles, wheezing, or rhonchi  ABDOMEN: Soft, nontender, nondistended, +BS, no hepatosplenomegaly, no CVA tenderness   EXTREMITIES: 2+ peripheral pulses bilaterally. No clubbing, cyanosis, or edema  NERVOUS SYSTEM:  A&Ox3, no focal deficits   SKIN: No rashes or lesions GENERAL: NAD, lying in bed comfortably  HEAD: Atraumatic, normocephalic  EYES: EOMI, PERRLA, conjunctiva and sclera clear  ENT: Moist mucous membranes  NECK: Supple, no JVD, no thyroidmegaly   HEART: S1, S2, Regular rate and rhythm, no murmurs, rubs, or gallops  LUNGS: Unlabored respirations, clear to auscultation bilaterally, no crackles, wheezing, or rhonchi  ABDOMEN: Soft, nontender, nondistended, +BS, no hepatosplenomegaly, no CVA tenderness   EXTREMITIES: 2+ peripheral pulses bilaterally. No clubbing, cyanosis, or edema  NERVOUS SYSTEM:  A&Ox1, no focal deficits   SKIN: No rashes or lesions GENERAL: NAD, lying in bed comfortably  HEAD: Atraumatic, normocephalic  EYES: PERRLA, conjunctiva and sclera w/ erythema   ENT: dry mucous membranes  NECK: Supple, no JVD,  HEART: S1, S2, Regular rate and rhythm, no murmurs, rubs, or gallops  LUNGS: Unlabored respirations, clear to auscultation bilaterally, no crackles, wheezing, or rhonchi  ABDOMEN: Soft, nontender, nondistended, +BS,  EXTREMITIES: 2+ peripheral pulses bilaterally. No clubbing, cyanosis, or edema. RUE with erythema  NERVOUS SYSTEM:  A&Ox1 - UE 5/5, LE 3/5. no focal deficits   SKIN: No rashes or lesions

## 2025-02-20 NOTE — CONSULT NOTE ADULT - PROBLEM SELECTOR RECOMMENDATION 4
- s/p LVAD explant and OHT 2/23/18 with hepatitis c donor  - blood pressure medications as stated below  - continue ASA 81 mg PO QD and Pravastatin 20 mg PO QD  - holding home bactrim due to hyperkalemia (was on Bactrim S/S PO M/W/F)  - continue nystatin 4 times daily

## 2025-02-20 NOTE — CONSULT NOTE ADULT - ATTENDING COMMENTS
Patient seen and examined - history is reviewed.  He is sleepy - arousable to voice - confused appearing - can not tell me where he is.  Can not say month or year.  He can name common objects and follow simple commands.  EOMI and VFF  Face symmetric and tongue midline  No drift  Strong in all 4 limbs.    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  With immunosuppression and cognitive worsening still not improved would recommend LP or widen coverage - defer to ID for specific recommendations.
Patient seen and examined with ID fellow. Patient lethargic but able to answer simple questions. Very dry mucus membranes on exam without a stiff neck. Not coughing  lungs- clear anteriorly  Cor- systolic murmur  non tender abdomen on exam  diaz with cloudy appearing urine  marked jump in leukocytosis  RUE- infiltrated peripheral IV (but it was placed today)  Distal bilateral feet with some skin sloughing no open wounds, tinea    Patient with sepsis picture, hypothermia, dehydration, lethargy  Source to be determined    Please send additional blood cultures  Send repeat UA and culture  obtain RUQ sonogram as CT notable for gall stones and porcelain GB    Notified that blood culture growing a gram negative don- identification and sensitivity pending    Would change antibiotics to Meropenem 1 gram IV q 12hr  Can hold further doses of Vancomycin unless an additional gram+ organism grows    Discussed with hospitalist team    Gary Lujan MD  Can be called via Teams  After 5pm/weekends [phone]
I have seen this patient with the fellow and agree with their assessment and plan. I was physically present for significant portions of the evaluation and management (E/M) service provided.  I agree with the above history, physical, and plan which I have reviewed and edited where appropriate.    NORMAN on CKD in setting of OHT( over 5 years out) and on being on bactrim and losartan outpatient  Appears dry, may need fluids and change in MS unclear- rule out CNS and cardiac causes  No signs of overt uremia  K better now, he is on standing valtessa at home- can give lokelma 10gm daily here  Diuretics once optimized with fluids today  Check renal sonogram   Never follows with us outpatient but has had overall NORMAN and progressive CKD likely from tacro toxicity  Check level at trough  ID eval pending    d/w with med team    Sarkis Hutchins MD  Office   Contact me directly via [employer] Teams     (After 5 pm or on weekends please page the on-call fellow/attending, can check [email or web address] for schedule. Login is andreia murrieta, schedule under [facility] medicine, psych, derm)

## 2025-02-20 NOTE — CONSULT NOTE ADULT - PROBLEM SELECTOR RECOMMENDATION 7
- hx of hemolytic anemia, follows with Dr. Rosario as outpatient  - remains on Prednisone 15 mg PO QD   - would favor reconsulting heme while inhouse

## 2025-02-20 NOTE — CONSULT NOTE ADULT - PROBLEM SELECTOR RECOMMENDATION 9
- continue to monitor FS and sliding scale per primary team - was readmitted with worsening mental status, currently A&Ox1  - CT Angio head 2/19 was unremarkable how limited diagnostic study secondary to suboptimal opacification of the   intracranial arterial vasculature  - CT A/P unremarkable  - please obtain CT Chest  - ID consulted for infectious workup  - Blood Cx pending  - please check CMV PCR   - remains on broad spectrum abx  - schedule to undergo LP on Monday 2/24

## 2025-02-20 NOTE — H&P ADULT - PROBLEM SELECTOR PLAN 4
ith known history of CKD.  -  Scr ranged from 1.3-2.1 in 2023. Most recent Scr was 1.75 on 1/30/24. On admission, Scr was 1.9 and remains stable at 1.8. UA showed trace ketones.   > Hold home ARB for now   > Euvolemic - will hold home bumex 0.5mg  Start bicarb tabs for metabolic acidosis ith known history of CKD3,   -  Scr ranged from 1.3-2.1 in 2023. Most recent Scr was 1.75 on 1/30/24. On admission, Scr was 1.9 and remains stable at 1.8. UA showed trace ketones.   > Hold home ARB for now   > Euvolemic - will hold home bumex 0.5mg  Start bicarb tabs for metabolic acidosis Known hx of CKD 3  -  Scr ranged from 1.3-2.1 in 2023. Most recent Scr was 1.75 on 1/30/24. On admission, Scr was 1.9 and remains stable at 1.8. UA showed trace ketones.   > Hold home ARB for now   > Euvolemic - will hold home Bumex 0.5mg

## 2025-02-20 NOTE — H&P ADULT - PROBLEM SELECTOR PLAN 6
- Fluids: None  - Electrolytes: Will replete to maintain K>4, Phos>3, and Mag>2  - Nutrition:   - Activity:   - DVT Prophylaxis:   - Stress Ulcer/GI Prophylaxis:   - Disposition: - Fluids: None  - Electrolytes: Will replete to maintain K>4, Phos>3, and Mag>2  - Nutrition: dysphagia screen --> Low K diet, regular   - Activity: PT   - DVT Prophylaxis: on Eliquis   - Stress Ulcer/GI Prophylaxis:   - Disposition: PT pending nonischemic cardiomyopathy, chronic systolic heart failure s/p HM2 LVAD (6/2017), s/p heart transplant from Hep. C donor (treated) 2/23/18   - Home Tacrolimus dose 2mg BID, Pred 15mg qd  > Continue with home Pred 15mg qd  > Hold home Bactrim given hyperkalemia  > c/w home ASA, Atorvastatin (therapeutic interchange)   > c/w home Metoprolol from 200mg with hold parameters   > Tacro level daily : home regimen 2mg BID, c/w 1mg BID  Note:  intolerant of Cellcept due to leukopenia

## 2025-02-20 NOTE — CONSULT NOTE ADULT - PROBLEM SELECTOR RECOMMENDATION 5
- will continue to adjust tacro as needed for goal 4-6 (home dose was tacro 2 mg PO BID  - of note he is intolerant of Cellcept due to leukopenia   - Prednisone as stated below

## 2025-02-20 NOTE — CONSULT NOTE ADULT - NS ATTEND AMEND GEN_ALL_CORE FT
74/M with OHT in 2018 and LVAD explant with postop graft dysfunction, has had AMR, CKD, HTN, Nicole syndrome, A.fib on Eliquis, last admission in Jan 2024 for NORMAN and syncope, now admitted after outpt labs with hyperkalemia, NORMAN, and AMS.     pt reports he knows he is in hospital but not know year, place. denies any cp, Palpitation, syncope, fall. denies any fevers, chills, cough, URI, diarrhoea.     Imp:  AMS  Hyperkalemia  Spesis   OHT in 2018  A.fib  NORMAN on CKD    plan:  pan cultures, CMV PCR  CT Abd/pelvis, Chest   broad spectrum abx  Txp ID consult    last TTE feb 2025 with normal graft function  Stress TTE in 2024 normal, w/o ischemia  LHC in 2023 w/o CAV    tacro level elevated  will hold tacrolimus today  resume lower dose tomorrow (order placed)  please get levels daily prior to his AM dose for trough  cont pred    hold home antihypertensives  treat hyperkalemia  repeat BMP this afternoon    rest as above

## 2025-02-20 NOTE — CONSULT NOTE ADULT - ASSESSMENT
74yo M pmhx HTN, HLD, CKD, nonischemic cardiomyopathy, chronic systolic heart failure s/p HM2 LVAD (6/2017), s/p heart transplant from Hep. 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     Recommendations:  - Vancomycin and pip/tazo  - Follow up pending labs: blood and urine cultures, MRSA PCR, CMV PCR, cryptococcus antigen, ASCENCION virus PCR  -  72yo M pmhx HTN, HLD, CKD, nonischemic cardiomyopathy, chronic systolic heart failure s/p HM2 LVAD (6/2017), s/p heart transplant from Hep. 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     Recommendations:  - Vancomycin and cefepime  - Follow up pending labs: blood and urine cultures, MRSA PCR, CMV PCR, cryptococcus antigen, ASCENCION virus PCR  - Additional labs: toxoplasma IgG/IgM, EBV PCR, Fungitell, Galactomannan  - Lumbar puncture ASAP: send CSF cell count, glucose, protein, culture, CSF PCR, CSF ASCENCION virus PCR   74yo M pmhx HTN, HLD, CKD, nonischemic cardiomyopathy, chronic systolic heart failure s/p HM2 LVAD (6/2017), s/p heart transplant from Hep. 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     Recommendations:   74yo M pmhx HTN, HLD, CKD, nonischemic cardiomyopathy, chronic systolic heart failure s/p HM2 LVAD (6/2017), s/p heart transplant from Hep. 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     High concern for sepsis in setting of leukocytosis, AMS, and initial hypothermia on presentation. Noted to have cloudy urine on exam today; suspect possible urinary source, though UA is wnl. Cultures pending. Currently with no signs of meningitis on exam. Should consider LP if AMS does not improve/alternative source is not identified. At this time we recommend empiric treatment with vancomycin and cefepime for CNS coverage. Patient has been on Bactrim prophylaxis, though adherence is unclear. We recommend additional infectious workup including Fungitell/Galactomannan as listed below.     Recommendations:  - Vancomycin and cefepime  - Valganciclovir prophylaxis  - OK to hold Bactrim prophylaxis for now in setting of hyperkalemia  - Please send: Toxoplasma IgG/IgM, EBV PCR, Fungitell, Galactomannan  - Follow up pending labs: blood and urine cultures, MRSA PCR, CMV PCR, cryptococcus antigen, ASCENCION virus PCR  - Consider LP  - Continue to monitor clinically  - Remainder of care per primary team

## 2025-02-20 NOTE — PATIENT PROFILE ADULT - SAFE PLACE TO LIVE
Health Maintenance Due   Topic Date Due   • Colorectal Cancer Screen-  Never done   • COVID-19 Vaccine (3 - Booster for Moderna series) 08/02/2021   • Shingles Vaccine (1 of 2) Never done       Patient is due for topics as listed above but is not proceeding with Immunization(s) COVID-19 and Shingles at this time.    no

## 2025-02-20 NOTE — H&P ADULT - PROBLEM SELECTOR PLAN 8
Home regimen : Eliquis 5 mg PO BID at home for hx of of afib and IJ thrombi  EKG on admission - nsr   [id number]  = 3  Last Dose : 2/20 AM   - currently holding in anticipation for LP 2/24

## 2025-02-20 NOTE — CONSULT NOTE ADULT - PROBLEM SELECTOR RECOMMENDATION 2
- noted to be hyperkalemic on labs upon admission  - remains elevated but improving  - Nephrology following  - will be started on Lokelma TID (of note was on veltassa at home)   - trend twice daily

## 2025-02-20 NOTE — H&P ADULT - ASSESSMENT
74yo M pmhx HTN, HLD, nonischemic cardiomyopathy, chronic systolic heart failure s/p HM2 LVAD (6/2017), s/p heart transplant from Hep. C donor (treated) 2/23/18 (post op course complicated by graft dysfunction treated by plasmapheresis, IVIG, and rituximab), on tacrolimus, sanchez syndrome (on prednisone), post transplant pAF/AFl on eliquis, presenting to the hospital with ____ found to metabolic encephelopathy 2/2 infection vs metabolic acidosis.    74yo M pmhx HTN, HLD, nonischemic cardiomyopathy, chronic systolic heart failure s/p HM2 LVAD (6/2017), s/p heart transplant from Hep. 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    72yo M pmhx HTN, HLD, nonischemic cardiomyopathy, chronic systolic heart failure s/p HM2 LVAD (6/2017), s/p heart transplant from Hep. 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

## 2025-02-20 NOTE — CHART NOTE - NSCHARTNOTEFT_GEN_A_CORE
Called to floor for agitation and no IV line   Attempted US guided line, however patient agitated combative.   Patient AO1, moving all extremities   Informed EDUARDO who will attempt later tonight when he is more calm   - Will require Meropenem and IVF for sepsis. NF informed regarding contingencies.    Lavelle chao pgy2

## 2025-02-20 NOTE — H&P ADULT - NSHPREVIEWOFSYSTEMS_GEN_ALL_CORE
REVIEW OF SYSTEMS:    CONSTITUTIONAL: No weakness, fevers or chills  EYES/ENT: No visual changes;  No vertigo or throat pain   NECK: No pain or stiffness  RESPIRATORY: No cough, wheezing, hemoptysis; No shortness of breath  CARDIOVASCULAR: No chest pain or palpitations  GASTROINTESTINAL: No abdominal or epigastric pain. No nausea, vomiting, or hematemesis; No diarrhea or constipation. No melena or hematochezia.  GENITOURINARY: No dysuria, frequency or hematuria  NEUROLOGICAL: No numbness or weakness  SKIN: No itching, rashes REVIEW OF SYSTEMS:  CONSTITUTIONAL: No weakness, fevers or chills  EYES/ENT: No visual changes;  No vertigo or throat pain   NECK: No pain or stiffness  RESPIRATORY: No cough, wheezing, hemoptysis; No shortness of breath  CARDIOVASCULAR: No chest pain or palpitations  GASTROINTESTINAL: No abdominal or epigastric pain. No nausea, vomiting, or hematemesis; No diarrhea or constipation. No melena or hematochezia.  GENITOURINARY: No dysuria, frequency or hematuria  NEUROLOGICAL: No numbness or weakness  SKIN: No itching, rashes

## 2025-02-20 NOTE — CONSULT NOTE ADULT - PROBLEM SELECTOR RECOMMENDATION 3
Patient with known history of CKD. Upon HIE review, Scr ranged from 1.3-2.1 in 2023. Most recent Scr was 1.75 on 1/30/24. On admission, Scr was 1.9 and remains stable at 1.8. UA showed trace ketones. Monitor SCr, electrolytes, and I/O. Avoid NSAIDS, RCAs, and other nephrotoxins. Renally adjust all medications as per GFR or CrCl    Please call if you have any questions  Joe Brush, PGY4  Nephrology Fellow  [phone]/Teams preferred  (After 5PM or weekends, reach out to fellow on call)
- noted to have NORMAN on admission  - Nephrology following, appreciate recommendations  - trend aily

## 2025-02-20 NOTE — H&P ADULT - PROBLEM SELECTOR PLAN 10
- Fluids: None  - Electrolytes: Will replete to maintain K>4, Phos>3, and Mag>2  - Nutrition: Low K diet, regular   - Activity: PT   - DVT Prophylaxis: on Eliquis on hold - SCDs  - Stress Ulcer/GI Prophylaxis:   - Disposition: PT pending

## 2025-02-20 NOTE — CONSULT NOTE ADULT - SUBJECTIVE AND OBJECTIVE BOX
ADVANCED HEART FAILURE & TRANSPLANT- CONSULT NOTE  *To reach the NS1 Team from 8am to 5pm, please call [phone].  ___________________________________________________________________________    HPI:  74yo M pmhx HTN, HLD, nonischemic cardiomyopathy, chronic systolic heart failure s/p HM2 LVAD (6/2017), s/p heart transplant from Hep. 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 - Patient was given Calcium Gluconate 2g, D50/Insulin 5 unit, 40 mg IV lasix, Lokelma 10mg.   Vancomycin and Zosyn  (20 Feb 2025 07:21)      PAST MEDICAL & SURGICAL HISTORY:  HTN      SVT (Supraventricular Tachycardia)      Non-Ischemic Cardiomyopathy  now s/p transplant 2018      GIB (gastrointestinal bleeding)      Hepatitis C virus      DVT of upper extremity (deep vein thrombosis)      Former smoker      HLD (hyperlipidemia)      Knee pain, right      H/O autoimmune hemolytic anemia      H/O hemolytic anemia      Status post left hip replacement      H/O heart transplant  2/2018      Social History:  Lives with godbrother. Ambulates with [employer]. (20 Feb 2025 07:21)      FAMILY HISTORY:  No pertinent family history in first degree relatives      Home Medications:  apixaban 5 mg oral tablet: 1 tab(s) orally every 12 hours (20 Feb 2025 08:44)  aspirin 81 mg oral tablet, chewable: 1 tab(s) orally once a day (20 Feb 2025 08:44)  bumetanide 1 mg oral tablet: 0.5 tab(s) orally once a day (20 Feb 2025 08:44)  latanoprost 0.005% ophthalmic solution: 1 gram(s) in each eye once a day (at bedtime) (20 Feb 2025 08:44)  losartan 25 mg oral tablet: 1 tab(s) orally once a day (20 Feb 2025 08:44)  metoprolol succinate 100 mg oral tablet, extended release: 2 tab(s) orally once a day (20 Feb 2025 08:44)  NovoLOG FlexPen 100 units/mL injectable solution: 2 unit(s) injectable once a day before lunch (20 Feb 2025 08:44)  nystatin 100,000 units/mL oral suspension: 5 milliliter(s) orally 4 times a day (20 Feb 2025 08:44)  patiromer 25.2 g oral powder for reconstitution: 25.2 gram(s) orally once a day (20 Feb 2025 08:44)  pravastatin 20 mg oral tablet: 1 orally once a day (20 Feb 2025 08:44)  predniSONE 10 mg oral tablet: 1.5 tab(s) orally once a day (20 Feb 2025 08:44)  Prolia 60 mg/mL subcutaneous solution: 60 milligram(s) subcutaneously every 6 months (20 Feb 2025 08:44)  sulfamethoxazole-trimethoprim 400 mg-80 mg oral tablet: 1 tab(s) orally 3 times a week Monday/Wednesday/Friday (20 Feb 2025 08:44)  tacrolimus 1 mg oral capsule: 2 cap(s) orally 2 times a day Tacrolimus 3mg twice daily (20 Feb 2025 08:44)  valGANciclovir 450 mg oral tablet: 1 tab(s) orally 2 times a week Monday and Thursday (20 Feb 2025 08:44)  Vitamin D3 25 mcg (1000 intl units) oral tablet: 1 tab(s) orally once a day (20 Feb 2025 08:44)          Medications:  acetaminophen     Tablet .. 650 milliGRAM(s) Oral every 6 hours PRN  aspirin  chewable 81 milliGRAM(s) Oral daily  atorvastatin 10 milliGRAM(s) Oral at bedtime  cholecalciferol 1000 Unit(s) Oral daily  melatonin 3 milliGRAM(s) Oral at bedtime PRN  metoprolol succinate  milliGRAM(s) Oral daily  phytonadione  IVPB 5 milliGRAM(s) IV Intermittent once  piperacillin/tazobactam IVPB.- 3.375 Gram(s) IV Intermittent once  piperacillin/tazobactam IVPB.. 3.375 Gram(s) IV Intermittent every 8 hours  predniSONE   Tablet 15 milliGRAM(s) Oral daily  sodium zirconium cyclosilicate 10 Gram(s) Oral three times a day      Vitals:  Vital Signs Last 24 Hours  T(C): 36.4 (02-20-25 @ 11:27), Max: 36.7 (02-20-25 @ 02:22)  HR: 56 (02-20-25 @ 11:27) (56 - 100)  BP: 150/70 (02-20-25 @ 11:27) (104/71 - 150/70)  RR: 18 (02-20-25 @ 11:27) (18 - 20)  SpO2: 95% (02-20-25 @ 11:27) (95% - 100%)        I&O's Summary      Physical Exam   General: resting in bed  Neck: Neck supple. JVP not elevated. No masses  Chest: Clear to auscultation bilaterally  CV: Normal S1 and S2.   Abdomen: Soft, non-distended, non-tender  Extremities right arm more swollen compared to left   Neurology: Alert and oriented times one    Labs:                        12.7   21.64 )-----------( 195      ( 20 Feb 2025 05:35 )             39.8     02-20    134[L]  |  102  |  64[H]  ----------------------------<  389[H]  5.6[H]   |  16[L]  |  2.02[H]    Ca    8.6      20 Feb 2025 05:35  Phos  4.8     02-20  Mg     1.9     02-20    TPro  6.1  /  Alb  3.2[L]  /  TBili  1.2  /  DBili  x   /  AST  68[H]  /  ALT  25  /  AlkPhos  54  02-19    PT/INR - ( 19 Feb 2025 17:29 )   PT: 20.2 sec;   INR: 1.77 ratio         PTT - ( 19 Feb 2025 17:29 )  PTT:39.3 sec            Lactate, Blood: 1.1 mmol/L (02-19 @ 17:29)

## 2025-02-20 NOTE — CONSULT NOTE ADULT - ASSESSMENT
75yo M pmhx HTN, HLD, nonischemic cardiomyopathy, s/p HM2 LVAD (6/2017),HM2 LVAD in June 2017 complicated by recurrent GI hemorrhage and possible pump thrombosis who underwent heart transplant on 2/23/18 with a hepatitis C positive donor w/ complicated post-transplant course including hemolytic anemia/Grayson's syndrome, viral pneumonia, recurrent falls and NORMAN presented to [facility] ER with AMS. Upon arrival was found to be hypothermic, hyperkalemic, noted to have a leukocytosis and was  admitted for further management. CTA head as well as CT A/P was unremarkable. He was started on IV abx and currently has cultures pending.  At this time patient remains A&Ox 1, will likely undergo LP early next week. Will continue to adjust his immunosuppression as needed

## 2025-02-20 NOTE — H&P ADULT - PROBLEM SELECTOR PLAN 3
----   - s/p Lasix 40 IV   > Hold home ARB  - f/u tacro level   - Hold home bactrim   - start atovaquone K 6.2 - shifted in the ED, no EKG changes consistent with severe hyper K. Cr not significantly elevated form baseline, c/f RTA from bactrim and Tacro   - s/p Lasix 40 IV   > Hold home ARB  - f/u tacro level   - Hold home bactrim   - start atovaquone  > Start bicarb orally 650 TID K 6.2 - shifted in the ED, no EKG changes consistent with severe hyper K. Cr not significantly elevated form baseline, c/f RTA from bactrim and Tacro   - s/p Lasix 40 IV   > Hold home ARB  - f/u Tacro level   - Hold home bactrim - ID consult regarding Atovaquone   > F/u PM Potassium K 6.2 - shifted in the ED, no EKG changes consistent with severe hyper K. Cr not significantly elevated form baseline, c/f RTA from bactrim and Tacro   - s/p Lasix 40 IV   > Hold home ARB  > f/u Tacro level daily   - Hold home bactrim - ID consult regarding Atovaquone   > c/w Lokelma 10mg BID for 3 days

## 2025-02-20 NOTE — H&P ADULT - PROBLEM SELECTOR PLAN 2
T 91.4F, WBC 21 concerning for possible occult infection, unclear source   - U/A without LE or Nitrites, CXR without clear consolidation  > CTAP with IV contrast to r/o intrabaomdinal infection given AMS   > Lower concern for menginitis at this moment   > f/u Bcx and Ucx, MRSA   > c/w Zosyn empirically 2/20 - T 91.4F, WBC 21 concerning for possible occult infection, unclear source   - U/A without LE or Nitrites, CXR without clear consolidation  > Lower concern for meningitis at this moment  > f/u Bcx and Ucx, MRSA   > c/w Zosyn empirically 2/20 -  > c/w home Valganciclovir for ppx   > CTAP with IV contrast to r/o intraabdominal infection given AMS T 91.4F, WBC 21 concerning for possible occult infection, unclear source,   - U/A without LE or Nitrites, CXR without clear consolidation  > Lower concern for meningitis at this moment  > f/u Bcx and Ucx, MRSA, CMV, Crypto, ASCENCION virus  > c/w Zosyn empirically 2/20 - , Vanc by level   > c/w home Valganciclovir for ppx   > CTAP with IV contrast - w/o source of infection   > Transplant ID recommendations   > LP pending Monday 2/24

## 2025-02-20 NOTE — CHART NOTE - NSCHARTNOTEFT_GEN_A_CORE
Pt was seen and examined today.     CPK is elevated and Serum K elevated.   Could be ?Seizure. (lactate is not high though)  Pt is still confused.   Pt received IV contrast for CT as a part of stroke work up.   Tacro level is 10.5    Hyperkalemia could be iso Losartan and Bactrim use.   Pt was last seen be Dr. Hutchins in Nov 2018    PLAN:  Hold losartan for now.   Consult transplant ID - try to find alternative for Bactrim.   Lokelma 10 TID for now. (Pt is on Patiromer as out pt)  As the patient has hx of cardiac transplant - needs infectious work up - work up as per transplant ID.   Monitor labs, I/Os and daily wts   Rest of the management as per primary team.       Spoke with the primary team at the bedside.

## 2025-02-20 NOTE — PATIENT PROFILE ADULT - FALL HARM RISK - HARM RISK INTERVENTIONS
Assistance with ambulation/Assistance OOB with selected safe patient handling equipment/Communicate Risk of Fall with Harm to all staff/Discuss with provider need for PT consult/Monitor gait and stability/Provide patient with walking aids - walker, cane, crutches/Reinforce activity limits and safety measures with patient and family/Sit up slowly, dangle for a short time, stand at bedside before walking/Tailored Fall Risk Interventions/Visual Cue: Yellow wristband and red socks/Bed in lowest position, wheels locked, appropriate side rails in place/Call bell, personal items and telephone in reach/Instruct patient to call for assistance before getting out of bed or chair/Non-slip footwear when patient is out of bed/[place] to call system/Physically safe environment - no spills, clutter or unnecessary equipment/Purposeful Proactive Rounding/Room/bathroom lighting operational, light cord in reach

## 2025-02-20 NOTE — H&P ADULT - NSHPLABSRESULTS_GEN_ALL_CORE
LABS:                        12.7   21.64 )-----------( 195      ( 20 Feb 2025 05:35 )             39.8     02-20    134[L]  |  102  |  64[H]  ----------------------------<  389[H]  5.6[H]   |  16[L]  |  2.02[H]    Ca    8.6      20 Feb 2025 05:35  Phos  4.8     02-20  Mg     1.9     02-20    TPro  6.1  /  Alb  3.2[L]  /  TBili  1.2  /  DBili  x   /  AST  68[H]  /  ALT  25  /  AlkPhos  54  02-19    PT/INR - ( 19 Feb 2025 17:29 )   PT: 20.2 sec;   INR: 1.77 ratio             PTT - ( 19 Feb 2025 17:29 )  PTT:39.3 sec      Urinalysis Basic - ( 20 Feb 2025 05:35 )    Color: x / Appearance: x / SG: x / pH: x  Gluc: 389 mg/dL / Ketone: x  / Bili: x / Urobili: x   Blood: x / Protein: x / Nitrite: x   Leuk Esterase: x / RBC: x / WBC x   Sq Epi: x / Non Sq Epi: x / Bacteria: x LABS:                        12.7   21.64 )-----------( 195      ( 20 Feb 2025 05:35 )             39.8     02-20    134[L]  |  102  |  64[H]  ----------------------------<  389[H]  5.6[H]   |  16[L]  |  2.02[H]    Ca    8.6      20 Feb 2025 05:35  Phos  4.8     02-20  Mg     1.9     02-20    TPro  6.1  /  Alb  3.2[L]  /  TBili  1.2  /  DBili  x   /  AST  68[H]  /  ALT  25  /  AlkPhos  54  02-19    PT/INR - ( 19 Feb 2025 17:29 )   PT: 20.2 sec;   INR: 1.77 ratio      < from: CT Abdomen and Pelvis w/ IV Cont (02.20.25 @ 10:16) >    IMPRESSION:  No focal collection.  No acute findings in the abdomen or pelvis.    < end of copied text >    < from: CT Angio Neck w/ IV Cont (02.19.25 @ 18:43) >      IMPRESSION:  (Limited diagnostic study secondary to suboptimal opacification of the   intracranial arterial vasculature).  CTA NECK:  No evidence of significant stenosis or occlusion.    CTA HEAD:  No large vessel occlusion, significant stenosis or vascular abnormality   identified.        --- End of Report ---    < end of copied text >    < from: CT Head No Cont (02.19.25 @ 18:43) >    IMPRESSION:  1. No acute intracranial hemorrhage, mass effect, or midline shift. If   alteredmental status persists, consider further evaluation via MR   imaging to include DWI and ADC mapping techniques, provided there are no   contraindications.  2. 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 These findings were present on the prior study dated 1/25/2024.        --- End of Report ---    < end of copied text >               PTT - ( 19 Feb 2025 17:29 )  PTT:39.3 sec      Urinalysis Basic - ( 20 Feb 2025 05:35 )    Color: x / Appearance: x / SG: x / pH: x  Gluc: 389 mg/dL / Ketone: x  / Bili: x / Urobili: x   Blood: x / Protein: x / Nitrite: x   Leuk Esterase: x / RBC: x / WBC x   Sq Epi: x / Non Sq Epi: x / Bacteria: x

## 2025-02-20 NOTE — CONSULT NOTE ADULT - ASSESSMENT
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 In ED met sepsis criteria with hypothermia and leukocytosis. UA, CXR negative for acute pathology. Cultures pending. Started on empiric vanc/zosyn. Labs otherwise notable for hyperkalemia, NORMAN, acidemia, elevated CK. On exam, patient is AAOx1, speech is dysarthric and perseverative. Naming and repetition are intact. Upper and lower extremity 5/5 strength. CT head no acute pathology, CTA no significant vessel stenosis or occlusion.    Impression: Delirium/encephalopathy likely due to infection in setting of chronic immunosuppression.     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 In ED met sepsis criteria with hypothermia and leukocytosis. UA, CXR negative for acute pathology. Cultures pending. Started on empiric vanc/zosyn. Labs otherwise notable for hyperkalemia, NORMAN, acidemia, elevated CK. On exam, patient is AAOx1, speech is dysarthric and perseverative. Naming and repetition are intact. Upper and lower extremity 5/5 strength. No meningismus. CT head no acute pathology, CTA no significant vessel stenosis or occlusion.    Impression: Altered mental likely due to infection in setting of chronic immunosuppression. No clear source at this time, r/o CNS infection     Recommendations:  [] Toxic-metabolic workup: TSH 2.57, Folate, B12, Vitamin B1, B6, ammonia, RPR, Lyme IgG/M, ESR, CRP  [] LP scheduled 2/24 per primary team note. ID consult if patient requires more urgent LP and need for empiric treatment for meningitis/encephalitis given high infection risk  [] EEG if no improvement of mental status and no clear infectious etiology    Discussed with attending Dr. Miner. Case and plan not finalized until attending attestation.

## 2025-02-20 NOTE — CHART NOTE - NSCHARTNOTEFT_GEN_A_CORE
Called to bedside to evaluate Right Upper Extremity     Noted to have swelling and leakage. Swelling noted this morning.   Patient is awake and does not c/o pain of the RUE.   EXAM - AOx3, Able to move RUE and squeeze hands, Radial pulses weak, RUE does not appear tense   RUE with edema, no thrombosis on DVT studies   Possible extravasation of IV     Plan - Remove IV once possible, Elevate RUE with warm packs   Will tell RN to use LUE for IV despite warming bands (unclear why patient has this) - Collateral unable to verbalize why   Given risk of further sepsis, will continue with IV in Left arm for now to allow for administration of Meropenam    Lavelle Dalton PGY 2

## 2025-02-20 NOTE — CONSULT NOTE ADULT - SUBJECTIVE AND OBJECTIVE BOX
Patient is a 74y old  Male who presents with a chief complaint of Lethargy (20 Feb 2025 13:01)    HPI:  74yo M pmhx HTN, HLD, nonischemic cardiomyopathy, chronic systolic heart failure s/p HM2 LVAD (6/2017), s/p heart transplant from Hep. 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 - Patient was given Calcium Gluconate 2g, D50/Insulin 5 unit, 40 mg IV lasix, Lokelma 10mg.   Vancomycin and Zosyn  (20 Feb 2025 07:21)       REVIEW OF SYSTEMS  Constitutional: No fevers, chills, weight loss or fatigue   Skin: No rash, no phlebitis	  Eyes: No discharge	  ENMT: No sore throat, oral thrush, ulcers or exudate  Respiratory: No cough, no SOB  Cardiovascular:  No chest pain, palpitations or edema   Gastrointestinal: No pain, nausea, vomiting, diarrhea or constipation	  Genitourinary: No dysuria, discharge or flank pain  MSK: No arthralgias or back pain   Neurological: No HA, no weakness, no seizures, no AMS       prior hospital charts reviewed [V]  primary team notes reviewed [V]  other consultant notes reviewed [V]    PAST MEDICAL & SURGICAL HISTORY:  HTN      SVT (Supraventricular Tachycardia)      Non-Ischemic Cardiomyopathy  now s/p transplant 2018      GIB (gastrointestinal bleeding)      Hepatitis C virus      DVT of upper extremity (deep vein thrombosis)      Former smoker      HLD (hyperlipidemia)      Knee pain, right      H/O autoimmune hemolytic anemia      H/O hemolytic anemia      Status post left hip replacement      H/O heart transplant  2/2018          SOCIAL HISTORY:  - Denied smoking/vaping/alcohol/recreational drug use    FAMILY HISTORY:  No pertinent family history in first degree relatives        Allergies  No Known Allergies        ANTIMICROBIALS:  piperacillin/tazobactam IVPB.- 3.375 once  piperacillin/tazobactam IVPB.. 3.375 every 8 hours      ANTIMICROBIALS (past 90 days):  MEDICATIONS  (STANDING):  piperacillin/tazobactam IVPB.   200 mL/Hr IV Intermittent (02-20-25 @ 06:05)    piperacillin/tazobactam IVPB.-   25 mL/Hr IV Intermittent (02-20-25 @ 09:37)    piperacillin/tazobactam IVPB...   200 mL/Hr IV Intermittent (02-19-25 @ 18:07)    vancomycin  IVPB.   250 mL/Hr IV Intermittent (02-19-25 @ 19:41)        OTHER MEDS:   MEDICATIONS  (STANDING):  acetaminophen     Tablet .. 650 every 6 hours PRN  aspirin  chewable 81 daily  atorvastatin 10 at bedtime  melatonin 3 at bedtime PRN  predniSONE   Tablet 15 daily      VITALS:  Vital Signs Last 24 Hrs  T(F): 97.6 (02-20-25 @ 11:27), Max: 98 (02-20-25 @ 02:22)    Vital Signs Last 24 Hrs  HR: 56 (02-20-25 @ 11:27) (56 - 100)  BP: 150/70 (02-20-25 @ 11:27) (104/71 - 150/70)  RR: 18 (02-20-25 @ 11:27)  SpO2: 95% (02-20-25 @ 11:27) (95% - 100%)  Wt(kg): --    EXAM:  General: Patient in no acute distress   HEENT: NCAT, EOMI, PERRL, no oral lesions  CV: S1+S2, no m/r/g appreciated   Lungs: No respiratory distress, CTAB  Abd: Soft, nontender, no guarding, no rebound tenderness, + bowel sounds   Ext: No cyanosis, no edema  Neuro: Alert and oriented, no focal deficits, CN II-XII grossly intact   Skin: No rash   IV: No phlebitis      Labs:                        12.7   21.64 )-----------( 195      ( 20 Feb 2025 05:35 )             39.8     02-20    134[L]  |  102  |  64[H]  ----------------------------<  389[H]  5.6[H]   |  16[L]  |  2.02[H]    Ca    8.6      20 Feb 2025 05:35  Phos  4.8     02-20  Mg     1.9     02-20    TPro  6.1  /  Alb  3.2[L]  /  TBili  1.2  /  DBili  x   /  AST  68[H]  /  ALT  25  /  AlkPhos  54  02-19      WBC Trend:  WBC Count: 21.64 (02-20-25 @ 05:35)  WBC Count: 19.67 (02-19-25 @ 17:29)  WBC Count: 12.12 (02-18-25 @ 09:27)      Auto Neutrophil #: 6.08 K/uL (05-10-24 @ 09:42)  Auto Neutrophil #: 7.96 K/uL (04-10-24 @ 08:39)  Auto Neutrophil #: 6.92 K/uL (03-06-24 @ 09:21)      Creatine Trend:  Creatinine: 2.02 (02-20)  Creatinine: 2.10 (02-20)  Creatinine: 1.85 (02-19)  Creatinine: 1.35 (02-19)      Liver Biochemical Testing Trend:  Alanine Aminotransferase (ALT/SGPT): 25 (02-19)  Alanine Aminotransferase (ALT/SGPT): 26 (02-19)  Aspartate Aminotransferase (AST/SGOT): 68 (02-19-25 @ 22:56)  Aspartate Aminotransferase (AST/SGOT): 65 (02-19-25 @ 17:29)  Bilirubin Total: 1.2 (02-19)  Bilirubin Total: 1.0 (02-19)      Trend LDH  07-17-23 @ 10:19  368[H]  07-16-23 @ 10:11  358[H]  07-15-23 @ 10:35  313[H]  07-14-23 @ 07:03  261[H]  07-13-23 @ 10:26  304[H]          Urinalysis Basic - ( 20 Feb 2025 05:35 )    Color: x / Appearance: x / SG: x / pH: x  Gluc: 389 mg/dL / Ketone: x  / Bili: x / Urobili: x   Blood: x / Protein: x / Nitrite: x   Leuk Esterase: x / RBC: x / WBC x   Sq Epi: x / Non Sq Epi: x / Bacteria: x        MICROBIOLOGY:        Culture - Blood (collected 25 Jan 2024 18:05)  Source: .Blood Blood-Peripheral  Final Report:    No growth at 5 days    Culture - Blood (collected 25 Jan 2024 17:50)  Source: .Blood Blood-Peripheral  Final Report:    No growth at 5 days    Culture - Blood (collected 16 Jul 2023 09:25)  Source: .Blood Blood-Peripheral  Final Report:    No growth at 5 days    Culture - Blood (collected 16 Jul 2023 09:20)  Source: .Blood Blood-Peripheral  Final Report:    No growth at 5 days    Culture - Blood (collected 11 Jul 2023 10:32)  Source: .Blood Blood-Peripheral  Final Report:    Growth in aerobic bottle: Staphylococcus saprophyticus    Coagulase Negative Staphylococci isolated from a single blood culture set    may represent contamination.    Contact the Microbiology Department at [phone] if susceptibility    testing is clinically indicated.    Direct identification is available within approximately 3-5    hours either by Blood Panel Multiplexed PCR or Direct    MALDI-TOF. Details: [email or web address]  Organism: Blood Culture PCR  Organism: Blood Culture PCR    Sensitivities:      Method Type: PCR      -  Coagulase negative Staphylococcus: Detec    Culture - Blood (collected 11 Jul 2023 10:29)  Source: .Blood Blood-Peripheral  Final Report:    No growth at 5 days    Culture - Urine (collected 10 Jul 2023 17:19)  Source: Clean Catch Clean Catch (Midstream)  Final Report:    <10,000 CFU/mL Normal Urogenital Heaven    Culture - Blood (collected 10 Jul 2023 15:38)  Source: .Blood Blood-Peripheral  Final Report:    No growth at 5 days    Culture - Blood (collected 16 Jun 2023 09:25)  Source: .Blood Blood-Peripheral  Final Report:    No Growth Final    Culture - Blood (collected 16 Jun 2023 09:25)  Source: .Blood Blood-Peripheral  Final Report:    No Growth Final                                            Troponin T, High Sensitivity Result: 43 (02-19)  Troponin T, High Sensitivity Result: 59 (02-19)    Blood Gas Arterial, Lactate: 1.0 (02-20 @ 06:41)  Blood Gas Venous - Lactate: 1.5 (02-19 @ 22:56)  Blood Gas Venous - Lactate: 1.4 (02-19 @ 20:07)  Blood Gas Venous - Lactate: 1.0 (02-19 @ 19:33)        RADIOLOGY:  imaging below personally reviewed   Patient is a 74y old  Male who presents with a chief complaint of Lethargy (20 Feb 2025 13:01)    HPI as written by primary team:  "72yo M pmhx HTN, HLD, nonischemic cardiomyopathy, chronic systolic heart failure s/p HM2 LVAD (6/2017), s/p heart transplant from Hep. 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 On the phone, the patient's godbrother mentioned that on 2/18, the patient seemed disoriented and asked to get off on the road 4 blocks before his house    In the ED: Hypothermic 91.4, HR 80, /60, RA   Patient noted to be Hyperkalemic (6.2) with Mixed respiratory and metabolic acidosis pH 7.2. - Patient was given Calcium Gluconate 2g, D50/Insulin 5 unit, 40 mg IV lasix, Lokelma 10mg.   Vancomycin and Zosyn"         REVIEW OF SYSTEMS  Unable to obtain due to altered mental status.       prior hospital charts reviewed [V]  primary team notes reviewed [V]  other consultant notes reviewed [V]    PAST MEDICAL & SURGICAL HISTORY:  HTN      SVT (Supraventricular Tachycardia)      Non-Ischemic Cardiomyopathy  now s/p transplant 2018      GIB (gastrointestinal bleeding)      Hepatitis C virus      DVT of upper extremity (deep vein thrombosis)      Former smoker      HLD (hyperlipidemia)      Knee pain, right      H/O autoimmune hemolytic anemia      H/O hemolytic anemia      Status post left hip replacement      H/O heart transplant  2/2018          SOCIAL HISTORY:  - Denied smoking/vaping/alcohol/recreational drug use    FAMILY HISTORY:  No pertinent family history in first degree relatives        Allergies  No Known Allergies        ANTIMICROBIALS:  piperacillin/tazobactam IVPB.- 3.375 once  piperacillin/tazobactam IVPB.. 3.375 every 8 hours      ANTIMICROBIALS (past 90 days):  MEDICATIONS  (STANDING):  piperacillin/tazobactam IVPB.   200 mL/Hr IV Intermittent (02-20-25 @ 06:05)    piperacillin/tazobactam IVPB.-   25 mL/Hr IV Intermittent (02-20-25 @ 09:37)    piperacillin/tazobactam IVPB...   200 mL/Hr IV Intermittent (02-19-25 @ 18:07)    vancomycin  IVPB.   250 mL/Hr IV Intermittent (02-19-25 @ 19:41)        OTHER MEDS:   MEDICATIONS  (STANDING):  acetaminophen     Tablet .. 650 every 6 hours PRN  aspirin  chewable 81 daily  atorvastatin 10 at bedtime  melatonin 3 at bedtime PRN  predniSONE   Tablet 15 daily      VITALS:  Vital Signs Last 24 Hrs  T(F): 97.6 (02-20-25 @ 11:27), Max: 98 (02-20-25 @ 02:22)    Vital Signs Last 24 Hrs  HR: 56 (02-20-25 @ 11:27) (56 - 100)  BP: 150/70 (02-20-25 @ 11:27) (104/71 - 150/70)  RR: 18 (02-20-25 @ 11:27)  SpO2: 95% (02-20-25 @ 11:27) (95% - 100%)  Wt(kg): --    EXAM:  General: Patient in no acute distress   HEENT: NCAT, EOMI, PERRL, no oral lesions  CV: S1+S2, no m/r/g appreciated   Lungs: No respiratory distress, CTAB  Abd: Soft, nontender, no guarding, no rebound tenderness, + bowel sounds   Ext: No cyanosis, no edema  Neuro: Alert and oriented, no focal deficits, CN II-XII grossly intact   Skin: No rash   IV: No phlebitis      Labs:                        12.7   21.64 )-----------( 195      ( 20 Feb 2025 05:35 )             39.8     02-20    134[L]  |  102  |  64[H]  ----------------------------<  389[H]  5.6[H]   |  16[L]  |  2.02[H]    Ca    8.6      20 Feb 2025 05:35  Phos  4.8     02-20  Mg     1.9     02-20    TPro  6.1  /  Alb  3.2[L]  /  TBili  1.2  /  DBili  x   /  AST  68[H]  /  ALT  25  /  AlkPhos  54  02-19      WBC Trend:  WBC Count: 21.64 (02-20-25 @ 05:35)  WBC Count: 19.67 (02-19-25 @ 17:29)  WBC Count: 12.12 (02-18-25 @ 09:27)      Auto Neutrophil #: 6.08 K/uL (05-10-24 @ 09:42)  Auto Neutrophil #: 7.96 K/uL (04-10-24 @ 08:39)  Auto Neutrophil #: 6.92 K/uL (03-06-24 @ 09:21)      Creatine Trend:  Creatinine: 2.02 (02-20)  Creatinine: 2.10 (02-20)  Creatinine: 1.85 (02-19)  Creatinine: 1.35 (02-19)      Liver Biochemical Testing Trend:  Alanine Aminotransferase (ALT/SGPT): 25 (02-19)  Alanine Aminotransferase (ALT/SGPT): 26 (02-19)  Aspartate Aminotransferase (AST/SGOT): 68 (02-19-25 @ 22:56)  Aspartate Aminotransferase (AST/SGOT): 65 (02-19-25 @ 17:29)  Bilirubin Total: 1.2 (02-19)  Bilirubin Total: 1.0 (02-19)      Trend LDH  07-17-23 @ 10:19  368[H]  07-16-23 @ 10:11  358[H]  07-15-23 @ 10:35  313[H]  07-14-23 @ 07:03  261[H]  07-13-23 @ 10:26  304[H]      Urinalysis (02.19.25 @ 18:19)    Blood, Urine: Negative   Glucose Qualitative, Urine: Negative mg/dL   pH Urine: 5.5   Color: Yellow   Urine Appearance: Clear   Bilirubin: Negative   Ketone - Urine: Trace mg/dL   Specific Gravity: 1.019   Protein, Urine: Negative mg/dL   Urobilinogen: 1.0 mg/dL   Nitrite: Negative   Leukocyte Esterase Concentration: Negative        MICROBIOLOGY:        Culture - Blood (collected 25 Jan 2024 18:05)  Source: .Blood Blood-Peripheral  Final Report:    No growth at 5 days    Culture - Blood (collected 25 Jan 2024 17:50)  Source: .Blood Blood-Peripheral  Final Report:    No growth at 5 days    Culture - Blood (collected 16 Jul 2023 09:25)  Source: .Blood Blood-Peripheral  Final Report:    No growth at 5 days    Culture - Blood (collected 16 Jul 2023 09:20)  Source: .Blood Blood-Peripheral  Final Report:    No growth at 5 days    Culture - Blood (collected 11 Jul 2023 10:32)  Source: .Blood Blood-Peripheral  Final Report:    Growth in aerobic bottle: Staphylococcus saprophyticus    Coagulase Negative Staphylococci isolated from a single blood culture set    may represent contamination.    Contact the Microbiology Department at [phone] if susceptibility    testing is clinically indicated.    Direct identification is available within approximately 3-5    hours either by Blood Panel Multiplexed PCR or Direct    MALDI-TOF. Details: [email or web address]  Organism: Blood Culture PCR  Organism: Blood Culture PCR    Sensitivities:      Method Type: PCR      -  Coagulase negative Staphylococcus: Detec    Culture - Blood (collected 11 Jul 2023 10:29)  Source: .Blood Blood-Peripheral  Final Report:    No growth at 5 days    Culture - Urine (collected 10 Jul 2023 17:19)  Source: Clean Catch Clean Catch (Midstream)  Final Report:    <10,000 CFU/mL Normal Urogenital Heaven    Culture - Blood (collected 10 Jul 2023 15:38)  Source: .Blood Blood-Peripheral  Final Report:    No growth at 5 days    Culture - Blood (collected 16 Jun 2023 09:25)  Source: .Blood Blood-Peripheral  Final Report:    No Growth Final    Culture - Blood (collected 16 Jun 2023 09:25)  Source: .Blood Blood-Peripheral  Final Report:    No Growth Final        Troponin T, High Sensitivity Result: 43 (02-19)  Troponin T, High Sensitivity Result: 59 (02-19)    Blood Gas Arterial, Lactate: 1.0 (02-20 @ 06:41)  Blood Gas Venous - Lactate: 1.5 (02-19 @ 22:56)  Blood Gas Venous - Lactate: 1.4 (02-19 @ 20:07)  Blood Gas Venous - Lactate: 1.0 (02-19 @ 19:33)        RADIOLOGY:  imaging below personally reviewed      < from: CT Abdomen and Pelvis w/ IV Cont (02.20.25 @ 10:16) >  IMPRESSION:  No focal collection.  No acute findings in the abdomen or pelvis.  < end of copied text >      < from: CT Head No Cont (02.19.25 @ 18:43) >  IMPRESSION:  1. No acute intracranial hemorrhage, mass effect, or midline shift. If   alteredmental status persists, consider further evaluation via MR   imaging to include DWI and ADC mapping techniques, provided there are no   contraindications.  2. 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 These findings were present on the prior study dated 1/25/2024.  < end of copied text >        < from: CT Angio Neck w/ IV Cont (02.19.25 @ 18:43) >  IMPRESSION:  (Limited diagnostic study secondary to suboptimal opacification of the   intracranial arterial vasculature).  CTA NECK:  No evidence of significant stenosis or occlusion.    CTA HEAD:  No large vessel occlusion, significant stenosis or vascular abnormality   identified.  < end of copied text >      < from: Xray Chest 1 View- PORTABLE-Urgent (02.19.25 @ 20:06) >  IMPRESSION:  Similar atelectasis in the right lung base.  No pleural effusion or pneumothorax.  < end of copied text >         Patient is a 74y old  Male who presents with a chief complaint of Lethargy (20 Feb 2025 13:01)    HPI as written by primary team:  "72yo M pmhx HTN, HLD, nonischemic cardiomyopathy, chronic systolic heart failure s/p HM2 LVAD (6/2017), s/p heart transplant from Hep. 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 On the phone, the patient's godbrother mentioned that on 2/18, the patient seemed disoriented and asked to get off on the road 4 blocks before his house    In the ED: Hypothermic 91.4, HR 80, /60, RA   Patient noted to be Hyperkalemic (6.2) with Mixed respiratory and metabolic acidosis pH 7.2. - Patient was given Calcium Gluconate 2g, D50/Insulin 5 unit, 40 mg IV lasix, Lokelma 10mg.   Vancomycin and Zosyn"         REVIEW OF SYSTEMS  Unable to obtain due to altered mental status.       prior hospital charts reviewed [V]  primary team notes reviewed [V]  other consultant notes reviewed [V]    PAST MEDICAL & SURGICAL HISTORY:  HTN      SVT (Supraventricular Tachycardia)      Non-Ischemic Cardiomyopathy  now s/p transplant 2018      GIB (gastrointestinal bleeding)      Hepatitis C virus      DVT of upper extremity (deep vein thrombosis)      Former smoker      HLD (hyperlipidemia)      Knee pain, right      H/O autoimmune hemolytic anemia      H/O hemolytic anemia      Status post left hip replacement      H/O heart transplant  2/2018          SOCIAL HISTORY:  - Denied smoking/vaping/alcohol/recreational drug use    FAMILY HISTORY:  No pertinent family history in first degree relatives        Allergies  No Known Allergies        ANTIMICROBIALS:  piperacillin/tazobactam IVPB.- 3.375 once  piperacillin/tazobactam IVPB.. 3.375 every 8 hours      ANTIMICROBIALS (past 90 days):  MEDICATIONS  (STANDING):  piperacillin/tazobactam IVPB.   200 mL/Hr IV Intermittent (02-20-25 @ 06:05)    piperacillin/tazobactam IVPB.-   25 mL/Hr IV Intermittent (02-20-25 @ 09:37)    piperacillin/tazobactam IVPB...   200 mL/Hr IV Intermittent (02-19-25 @ 18:07)    vancomycin  IVPB.   250 mL/Hr IV Intermittent (02-19-25 @ 19:41)        OTHER MEDS:   MEDICATIONS  (STANDING):  acetaminophen     Tablet .. 650 every 6 hours PRN  aspirin  chewable 81 daily  atorvastatin 10 at bedtime  melatonin 3 at bedtime PRN  predniSONE   Tablet 15 daily      VITALS:  Vital Signs Last 24 Hrs  T(F): 97.6 (02-20-25 @ 11:27), Max: 98 (02-20-25 @ 02:22)    Vital Signs Last 24 Hrs  HR: 56 (02-20-25 @ 11:27) (56 - 100)  BP: 150/70 (02-20-25 @ 11:27) (104/71 - 150/70)  RR: 18 (02-20-25 @ 11:27)  SpO2: 95% (02-20-25 @ 11:27) (95% - 100%)  Wt(kg): --    EXAM:  General: Patient in no acute distress   HEENT: NCAT, EOMI, PERRL, no oral lesions  CV: S1+S2, +continuous murmur  Lungs: No respiratory distress, CTAB  Abd: Soft, nontender, no guarding, no rebound tenderness  Ext: No cyanosis, no edema  Neuro: A/O x 2 (knows name and state, but not year), no focal deficits, CN II-XII grossly intact   Skin: No rash   IV: No phlebitis      Labs:                        12.7   21.64 )-----------( 195      ( 20 Feb 2025 05:35 )             39.8     02-20    134[L]  |  102  |  64[H]  ----------------------------<  389[H]  5.6[H]   |  16[L]  |  2.02[H]    Ca    8.6      20 Feb 2025 05:35  Phos  4.8     02-20  Mg     1.9     02-20    TPro  6.1  /  Alb  3.2[L]  /  TBili  1.2  /  DBili  x   /  AST  68[H]  /  ALT  25  /  AlkPhos  54  02-19      WBC Trend:  WBC Count: 21.64 (02-20-25 @ 05:35)  WBC Count: 19.67 (02-19-25 @ 17:29)  WBC Count: 12.12 (02-18-25 @ 09:27)      Auto Neutrophil #: 6.08 K/uL (05-10-24 @ 09:42)  Auto Neutrophil #: 7.96 K/uL (04-10-24 @ 08:39)  Auto Neutrophil #: 6.92 K/uL (03-06-24 @ 09:21)      Creatine Trend:  Creatinine: 2.02 (02-20)  Creatinine: 2.10 (02-20)  Creatinine: 1.85 (02-19)  Creatinine: 1.35 (02-19)      Liver Biochemical Testing Trend:  Alanine Aminotransferase (ALT/SGPT): 25 (02-19)  Alanine Aminotransferase (ALT/SGPT): 26 (02-19)  Aspartate Aminotransferase (AST/SGOT): 68 (02-19-25 @ 22:56)  Aspartate Aminotransferase (AST/SGOT): 65 (02-19-25 @ 17:29)  Bilirubin Total: 1.2 (02-19)  Bilirubin Total: 1.0 (02-19)      Trend LDH  07-17-23 @ 10:19  368[H]  07-16-23 @ 10:11  358[H]  07-15-23 @ 10:35  313[H]  07-14-23 @ 07:03  261[H]  07-13-23 @ 10:26  304[H]      Urinalysis (02.19.25 @ 18:19)    Blood, Urine: Negative   Glucose Qualitative, Urine: Negative mg/dL   pH Urine: 5.5   Color: Yellow   Urine Appearance: Clear   Bilirubin: Negative   Ketone - Urine: Trace mg/dL   Specific Gravity: 1.019   Protein, Urine: Negative mg/dL   Urobilinogen: 1.0 mg/dL   Nitrite: Negative   Leukocyte Esterase Concentration: Negative        MICROBIOLOGY:        Culture - Blood (collected 25 Jan 2024 18:05)  Source: .Blood Blood-Peripheral  Final Report:    No growth at 5 days    Culture - Blood (collected 25 Jan 2024 17:50)  Source: .Blood Blood-Peripheral  Final Report:    No growth at 5 days    Culture - Blood (collected 16 Jul 2023 09:25)  Source: .Blood Blood-Peripheral  Final Report:    No growth at 5 days    Culture - Blood (collected 16 Jul 2023 09:20)  Source: .Blood Blood-Peripheral  Final Report:    No growth at 5 days    Culture - Blood (collected 11 Jul 2023 10:32)  Source: .Blood Blood-Peripheral  Final Report:    Growth in aerobic bottle: Staphylococcus saprophyticus    Coagulase Negative Staphylococci isolated from a single blood culture set    may represent contamination.    Contact the Microbiology Department at [phone] if susceptibility    testing is clinically indicated.    Direct identification is available within approximately 3-5    hours either by Blood Panel Multiplexed PCR or Direct    MALDI-TOF. Details: [email or web address]  Organism: Blood Culture PCR  Organism: Blood Culture PCR    Sensitivities:      Method Type: PCR      -  Coagulase negative Staphylococcus: Detec    Culture - Blood (collected 11 Jul 2023 10:29)  Source: .Blood Blood-Peripheral  Final Report:    No growth at 5 days    Culture - Urine (collected 10 Jul 2023 17:19)  Source: Clean Catch Clean Catch (Midstream)  Final Report:    <10,000 CFU/mL Normal Urogenital Heaven    Culture - Blood (collected 10 Jul 2023 15:38)  Source: .Blood Blood-Peripheral  Final Report:    No growth at 5 days    Culture - Blood (collected 16 Jun 2023 09:25)  Source: .Blood Blood-Peripheral  Final Report:    No Growth Final    Culture - Blood (collected 16 Jun 2023 09:25)  Source: .Blood Blood-Peripheral  Final Report:    No Growth Final        Troponin T, High Sensitivity Result: 43 (02-19)  Troponin T, High Sensitivity Result: 59 (02-19)    Blood Gas Arterial, Lactate: 1.0 (02-20 @ 06:41)  Blood Gas Venous - Lactate: 1.5 (02-19 @ 22:56)  Blood Gas Venous - Lactate: 1.4 (02-19 @ 20:07)  Blood Gas Venous - Lactate: 1.0 (02-19 @ 19:33)        RADIOLOGY:  imaging below personally reviewed      < from: CT Abdomen and Pelvis w/ IV Cont (02.20.25 @ 10:16) >  IMPRESSION:  No focal collection.  No acute findings in the abdomen or pelvis.  < end of copied text >      < from: CT Head No Cont (02.19.25 @ 18:43) >  IMPRESSION:  1. No acute intracranial hemorrhage, mass effect, or midline shift. If   alteredmental status persists, consider further evaluation via MR   imaging to include DWI and ADC mapping techniques, provided there are no   contraindications.  2. 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 These findings were present on the prior study dated 1/25/2024.  < end of copied text >        < from: CT Angio Neck w/ IV Cont (02.19.25 @ 18:43) >  IMPRESSION:  (Limited diagnostic study secondary to suboptimal opacification of the   intracranial arterial vasculature).  CTA NECK:  No evidence of significant stenosis or occlusion.    CTA HEAD:  No large vessel occlusion, significant stenosis or vascular abnormality   identified.  < end of copied text >      < from: Xray Chest 1 View- PORTABLE-Urgent (02.19.25 @ 20:06) >  IMPRESSION:  Similar atelectasis in the right lung base.  No pleural effusion or pneumothorax.  < end of copied text >         Patient is a 74y old  Male who presents with a chief complaint of Lethargy (20 Feb 2025 13:01)    HPI as written by primary team:  "72yo M pmhx HTN, HLD, nonischemic cardiomyopathy, chronic systolic heart failure s/p HM2 LVAD (6/2017), s/p heart transplant from Hep. 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 On the phone, the patient's godbrother mentioned that on 2/18, the patient seemed disoriented and asked to get off on the road 4 blocks before his house    In the ED: Hypothermic 91.4, HR 80, /60, RA   Patient noted to be Hyperkalemic (6.2) with Mixed respiratory and metabolic acidosis pH 7.2. - Patient was given Calcium Gluconate 2g, D50/Insulin 5 unit, 40 mg IV lasix, Lokelma 10mg.   Vancomycin and Zosyn"    Additional ID history: Patient reports chest pain. Denies any recent travel. Reports that he used to go to the [place] but has not been there recently.       REVIEW OF SYSTEMS  CONSTITUTIONAL: No weakness, fevers or chills  EYES/ENT: No visual changes;  No vertigo or throat pain   NECK: No pain or stiffness  RESPIRATORY: No cough, wheezing, hemoptysis; No shortness of breath  CARDIOVASCULAR: +chest pain  GASTROINTESTINAL: No abdominal or epigastric pain. No nausea, vomiting, or hematemesis; No diarrhea or constipation. No melena or hematochezia.  GENITOURINARY: No dysuria, frequency or hematuria  NEUROLOGICAL: No numbness or weakness  SKIN: No itching, rashes      prior hospital charts reviewed [V]  primary team notes reviewed [V]  other consultant notes reviewed [V]    PAST MEDICAL & SURGICAL HISTORY:  HTN      SVT (Supraventricular Tachycardia)      Non-Ischemic Cardiomyopathy  now s/p transplant 2018      GIB (gastrointestinal bleeding)      Hepatitis C virus      DVT of upper extremity (deep vein thrombosis)      Former smoker      HLD (hyperlipidemia)      Knee pain, right      H/O autoimmune hemolytic anemia      H/O hemolytic anemia      Status post left hip replacement      H/O heart transplant  2/2018          SOCIAL HISTORY:  - Denied smoking/vaping/alcohol/recreational drug use    FAMILY HISTORY:  No pertinent family history in first degree relatives        Allergies  No Known Allergies        ANTIMICROBIALS:  piperacillin/tazobactam IVPB.- 3.375 once  piperacillin/tazobactam IVPB.. 3.375 every 8 hours      ANTIMICROBIALS (past 90 days):  MEDICATIONS  (STANDING):  piperacillin/tazobactam IVPB.   200 mL/Hr IV Intermittent (02-20-25 @ 06:05)    piperacillin/tazobactam IVPB.-   25 mL/Hr IV Intermittent (02-20-25 @ 09:37)    piperacillin/tazobactam IVPB...   200 mL/Hr IV Intermittent (02-19-25 @ 18:07)    vancomycin  IVPB.   250 mL/Hr IV Intermittent (02-19-25 @ 19:41)        OTHER MEDS:   MEDICATIONS  (STANDING):  acetaminophen     Tablet .. 650 every 6 hours PRN  aspirin  chewable 81 daily  atorvastatin 10 at bedtime  melatonin 3 at bedtime PRN  predniSONE   Tablet 15 daily      VITALS:  Vital Signs Last 24 Hrs  T(F): 97.6 (02-20-25 @ 11:27), Max: 98 (02-20-25 @ 02:22)    Vital Signs Last 24 Hrs  HR: 56 (02-20-25 @ 11:27) (56 - 100)  BP: 150/70 (02-20-25 @ 11:27) (104/71 - 150/70)  RR: 18 (02-20-25 @ 11:27)  SpO2: 95% (02-20-25 @ 11:27) (95% - 100%)  Wt(kg): --    EXAM:  General: Patient in no acute distress   HEENT: NCAT, EOMI, PERRL, no oral lesions  CV: S1+S2, +continuous murmur  Lungs: No respiratory distress, CTAB  Abd: Soft, nontender, no guarding, no rebound tenderness  : +urinary catheter in place draining cloudy, yellow urine  Ext: No cyanosis, no edema  Neuro: A/O x 2 (knows name and state, but not year), no focal deficits, CN II-XII grossly intact   Skin: No rash; +dry, broken skin on bilateral feet  IV: No phlebitis      Labs:                        12.7   21.64 )-----------( 195      ( 20 Feb 2025 05:35 )             39.8     02-20    134[L]  |  102  |  64[H]  ----------------------------<  389[H]  5.6[H]   |  16[L]  |  2.02[H]    Ca    8.6      20 Feb 2025 05:35  Phos  4.8     02-20  Mg     1.9     02-20    TPro  6.1  /  Alb  3.2[L]  /  TBili  1.2  /  DBili  x   /  AST  68[H]  /  ALT  25  /  AlkPhos  54  02-19      WBC Trend:  WBC Count: 21.64 (02-20-25 @ 05:35)  WBC Count: 19.67 (02-19-25 @ 17:29)  WBC Count: 12.12 (02-18-25 @ 09:27)      Auto Neutrophil #: 6.08 K/uL (05-10-24 @ 09:42)  Auto Neutrophil #: 7.96 K/uL (04-10-24 @ 08:39)  Auto Neutrophil #: 6.92 K/uL (03-06-24 @ 09:21)      Creatine Trend:  Creatinine: 2.02 (02-20)  Creatinine: 2.10 (02-20)  Creatinine: 1.85 (02-19)  Creatinine: 1.35 (02-19)      Liver Biochemical Testing Trend:  Alanine Aminotransferase (ALT/SGPT): 25 (02-19)  Alanine Aminotransferase (ALT/SGPT): 26 (02-19)  Aspartate Aminotransferase (AST/SGOT): 68 (02-19-25 @ 22:56)  Aspartate Aminotransferase (AST/SGOT): 65 (02-19-25 @ 17:29)  Bilirubin Total: 1.2 (02-19)  Bilirubin Total: 1.0 (02-19)      Trend LDH  07-17-23 @ 10:19  368[H]  07-16-23 @ 10:11  358[H]  07-15-23 @ 10:35  313[H]  07-14-23 @ 07:03  261[H]  07-13-23 @ 10:26  304[H]      Urinalysis (02.19.25 @ 18:19)    Blood, Urine: Negative   Glucose Qualitative, Urine: Negative mg/dL   pH Urine: 5.5   Color: Yellow   Urine Appearance: Clear   Bilirubin: Negative   Ketone - Urine: Trace mg/dL   Specific Gravity: 1.019   Protein, Urine: Negative mg/dL   Urobilinogen: 1.0 mg/dL   Nitrite: Negative   Leukocyte Esterase Concentration: Negative        MICROBIOLOGY:        Culture - Blood (collected 25 Jan 2024 18:05)  Source: .Blood Blood-Peripheral  Final Report:    No growth at 5 days    Culture - Blood (collected 25 Jan 2024 17:50)  Source: .Blood Blood-Peripheral  Final Report:    No growth at 5 days    Culture - Blood (collected 16 Jul 2023 09:25)  Source: .Blood Blood-Peripheral  Final Report:    No growth at 5 days    Culture - Blood (collected 16 Jul 2023 09:20)  Source: .Blood Blood-Peripheral  Final Report:    No growth at 5 days    Culture - Blood (collected 11 Jul 2023 10:32)  Source: .Blood Blood-Peripheral  Final Report:    Growth in aerobic bottle: Staphylococcus saprophyticus    Coagulase Negative Staphylococci isolated from a single blood culture set    may represent contamination.    Contact the Microbiology Department at [phone] if susceptibility    testing is clinically indicated.    Direct identification is available within approximately 3-5    hours either by Blood Panel Multiplexed PCR or Direct    MALDI-TOF. Details: [email or web address]  Organism: Blood Culture PCR  Organism: Blood Culture PCR    Sensitivities:      Method Type: PCR      -  Coagulase negative Staphylococcus: Detec    Culture - Blood (collected 11 Jul 2023 10:29)  Source: .Blood Blood-Peripheral  Final Report:    No growth at 5 days    Culture - Urine (collected 10 Jul 2023 17:19)  Source: Clean Catch Clean Catch (Midstream)  Final Report:    <10,000 CFU/mL Normal Urogenital [place]    Culture - Blood (collected 10 Jul 2023 15:38)  Source: .Blood Blood-Peripheral  Final Report:    No growth at 5 days    Culture - Blood (collected 16 Jun 2023 09:25)  Source: .Blood Blood-Peripheral  Final Report:    No Growth Final    Culture - Blood (collected 16 Jun 2023 09:25)  Source: .Blood Blood-Peripheral  Final Report:    No Growth Final        Troponin T, High Sensitivity Result: 43 (02-19)  Troponin T, High Sensitivity Result: 59 (02-19)    Blood Gas Arterial, Lactate: 1.0 (02-20 @ 06:41)  Blood Gas Venous - Lactate: 1.5 (02-19 @ 22:56)  Blood Gas Venous - Lactate: 1.4 (02-19 @ 20:07)  Blood Gas Venous - Lactate: 1.0 (02-19 @ 19:33)        RADIOLOGY:  imaging below personally reviewed      < from: CT Abdomen and Pelvis w/ IV Cont (02.20.25 @ 10:16) >  IMPRESSION:  No focal collection.  No acute findings in the abdomen or pelvis.  < end of copied text >      < from: CT Head No Cont (02.19.25 @ 18:43) >  IMPRESSION:  1. No acute intracranial hemorrhage, mass effect, or midline shift. If   alteredmental status persists, consider further evaluation via MR   imaging to include DWI and ADC mapping techniques, provided there are no   contraindications.  2. 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 These findings were present on the prior study dated 1/25/2024.  < end of copied text >        < from: CT Angio Neck w/ IV Cont (02.19.25 @ 18:43) >  IMPRESSION:  (Limited diagnostic study secondary to suboptimal opacification of the   intracranial arterial vasculature).  CTA NECK:  No evidence of significant stenosis or occlusion.    CTA HEAD:  No large vessel occlusion, significant stenosis or vascular abnormality   identified.  < end of copied text >      < from: Xray Chest 1 View- PORTABLE-Urgent (02.19.25 @ 20:06) >  IMPRESSION:  Similar atelectasis in the right lung base.  No pleural effusion or pneumothorax.  < end of copied text >         Patient is a 74y old  Male who presents with a chief complaint of Lethargy (20 Feb 2025 13:01)    HPI as written by primary team:  "74yo M pmhx HTN, HLD, nonischemic cardiomyopathy, chronic systolic heart failure s/p HM2 LVAD (6/2017), s/p heart transplant from Hep. 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 On the phone, the patient's godbrother mentioned that on 2/18, the patient seemed disoriented and asked to get off on the road 4 blocks before his house    In the ED: Hypothermic 91.4, HR 80, /60, RA   Patient noted to be Hyperkalemic (6.2) with Mixed respiratory and metabolic acidosis pH 7.2. - Patient was given Calcium Gluconate 2g, D50/Insulin 5 unit, 40 mg IV lasix, Lokelma 10mg.   Vancomycin and Zosyn"    Additional ID history: Patient reports chest pain. Denies any recent travel. Reports that he used to go to the [place] but has not been there recently.       REVIEW OF SYSTEMS  CONSTITUTIONAL: patient lethargic in the bed  EYES/ENT: No visual changes;  No vertigo or throat pain , mucus membranes very dry  NECK: No pain or stiffness  RESPIRATORY: No cough, wheezing, hemoptysis; No shortness of breath  CARDIOVASCULAR: +chest pain  GASTROINTESTINAL: No abdominal or epigastric pain. No nausea, vomiting, or hematemesis; No diarrhea or constipation. No melena or hematochezia.  GENITOURINARY: No dysuria, frequency or hematuria  NEUROLOGICAL: No numbness or weakness  SKIN: No itching, rashes      prior hospital charts reviewed [V]  primary team notes reviewed [V]  other consultant notes reviewed [V]    PAST MEDICAL & SURGICAL HISTORY:  HTN      SVT (Supraventricular Tachycardia)      Non-Ischemic Cardiomyopathy  now s/p transplant 2018      GIB (gastrointestinal bleeding)      Hepatitis C virus      DVT of upper extremity (deep vein thrombosis)      Former smoker      HLD (hyperlipidemia)      Knee pain, right      H/O autoimmune hemolytic anemia      H/O hemolytic anemia      Status post left hip replacement      H/O heart transplant  2/2018          SOCIAL HISTORY:  - Denied smoking/vaping/alcohol/recreational drug use    FAMILY HISTORY:  No pertinent family history in first degree relatives        Allergies  No Known Allergies        ANTIMICROBIALS:  piperacillin/tazobactam IVPB.- 3.375 once  piperacillin/tazobactam IVPB.. 3.375 every 8 hours      ANTIMICROBIALS (past 90 days):  MEDICATIONS  (STANDING):  piperacillin/tazobactam IVPB.   200 mL/Hr IV Intermittent (02-20-25 @ 06:05)    piperacillin/tazobactam IVPB.-   25 mL/Hr IV Intermittent (02-20-25 @ 09:37)    piperacillin/tazobactam IVPB...   200 mL/Hr IV Intermittent (02-19-25 @ 18:07)    vancomycin  IVPB.   250 mL/Hr IV Intermittent (02-19-25 @ 19:41)        OTHER MEDS:   MEDICATIONS  (STANDING):  acetaminophen     Tablet .. 650 every 6 hours PRN  aspirin  chewable 81 daily  atorvastatin 10 at bedtime  melatonin 3 at bedtime PRN  predniSONE   Tablet 15 daily      VITALS:  Vital Signs Last 24 Hrs  T(F): 97.6 (02-20-25 @ 11:27), Max: 98 (02-20-25 @ 02:22)    Vital Signs Last 24 Hrs  HR: 56 (02-20-25 @ 11:27) (56 - 100)  BP: 150/70 (02-20-25 @ 11:27) (104/71 - 150/70)  RR: 18 (02-20-25 @ 11:27)  SpO2: 95% (02-20-25 @ 11:27) (95% - 100%)  Wt(kg): --    EXAM:  General: Patient in no acute distress   HEENT: NCAT, EOMI, PERRL, no oral lesions  CV: S1+S2, +continuous murmur  Lungs: No respiratory distress, CTAB  Abd: Soft, nontender, no guarding, no rebound tenderness  : +urinary catheter in place draining cloudy, yellow urine  Ext: No cyanosis, no edema  Neuro: A/O x 2 (knows name and state, but not year), no focal deficits, CN II-XII grossly intact   Skin: No rash; +dry, broken skin on bilateral feet  IV: No phlebitis      Labs:                        12.7   21.64 )-----------( 195      ( 20 Feb 2025 05:35 )             39.8     02-20    134[L]  |  102  |  64[H]  ----------------------------<  389[H]  5.6[H]   |  16[L]  |  2.02[H]    Ca    8.6      20 Feb 2025 05:35  Phos  4.8     02-20  Mg     1.9     02-20    TPro  6.1  /  Alb  3.2[L]  /  TBili  1.2  /  DBili  x   /  AST  68[H]  /  ALT  25  /  AlkPhos  54  02-19      WBC Trend:  WBC Count: 21.64 (02-20-25 @ 05:35)  WBC Count: 19.67 (02-19-25 @ 17:29)  WBC Count: 12.12 (02-18-25 @ 09:27)      Auto Neutrophil #: 6.08 K/uL (05-10-24 @ 09:42)  Auto Neutrophil #: 7.96 K/uL (04-10-24 @ 08:39)  Auto Neutrophil #: 6.92 K/uL (03-06-24 @ 09:21)      Creatine Trend:  Creatinine: 2.02 (02-20)  Creatinine: 2.10 (02-20)  Creatinine: 1.85 (02-19)  Creatinine: 1.35 (02-19)      Liver Biochemical Testing Trend:  Alanine Aminotransferase (ALT/SGPT): 25 (02-19)  Alanine Aminotransferase (ALT/SGPT): 26 (02-19)  Aspartate Aminotransferase (AST/SGOT): 68 (02-19-25 @ 22:56)  Aspartate Aminotransferase (AST/SGOT): 65 (02-19-25 @ 17:29)  Bilirubin Total: 1.2 (02-19)  Bilirubin Total: 1.0 (02-19)      Trend LDH  07-17-23 @ 10:19  368[H]  07-16-23 @ 10:11  358[H]  07-15-23 @ 10:35  313[H]  07-14-23 @ 07:03  261[H]  07-13-23 @ 10:26  304[H]      Urinalysis (02.19.25 @ 18:19)    Blood, Urine: Negative   Glucose Qualitative, Urine: Negative mg/dL   pH Urine: 5.5   Color: Yellow   Urine Appearance: Clear   Bilirubin: Negative   Ketone - Urine: Trace mg/dL   Specific Gravity: 1.019   Protein, Urine: Negative mg/dL   Urobilinogen: 1.0 mg/dL   Nitrite: Negative   Leukocyte Esterase Concentration: Negative        MICROBIOLOGY:        Culture - Blood (collected 25 Jan 2024 18:05)  Source: .Blood Blood-Peripheral  Final Report:    No growth at 5 days    Culture - Blood (collected 25 Jan 2024 17:50)  Source: .Blood Blood-Peripheral  Final Report:    No growth at 5 days    Culture - Blood (collected 16 Jul 2023 09:25)  Source: .Blood Blood-Peripheral  Final Report:    No growth at 5 days    Culture - Blood (collected 16 Jul 2023 09:20)  Source: .Blood Blood-Peripheral  Final Report:    No growth at 5 days    Culture - Blood (collected 11 Jul 2023 10:32)  Source: .Blood Blood-Peripheral  Final Report:    Growth in aerobic bottle: Staphylococcus saprophyticus    Coagulase Negative Staphylococci isolated from a single blood culture set    may represent contamination.    Contact the Microbiology Department at [phone] if susceptibility    testing is clinically indicated.    Direct identification is available within approximately 3-5    hours either by Blood Panel Multiplexed PCR or Direct    MALDI-TOF. Details: [email or web address]  Organism: Blood Culture PCR  Organism: Blood Culture PCR    Sensitivities:      Method Type: PCR      -  Coagulase negative Staphylococcus: Detec    Culture - Blood (collected 11 Jul 2023 10:29)  Source: .Blood Blood-Peripheral  Final Report:    No growth at 5 days    Culture - Urine (collected 10 Jul 2023 17:19)  Source: Clean Catch Clean Catch (Midstream)  Final Report:    <10,000 CFU/mL Normal Urogenital [place]    Culture - Blood (collected 10 Jul 2023 15:38)  Source: .Blood Blood-Peripheral  Final Report:    No growth at 5 days    Culture - Blood (collected 16 Jun 2023 09:25)  Source: .Blood Blood-Peripheral  Final Report:    No Growth Final    Culture - Blood (collected 16 Jun 2023 09:25)  Source: .Blood Blood-Peripheral  Final Report:    No Growth Final        Troponin T, High Sensitivity Result: 43 (02-19)  Troponin T, High Sensitivity Result: 59 (02-19)    Blood Gas Arterial, Lactate: 1.0 (02-20 @ 06:41)  Blood Gas Venous - Lactate: 1.5 (02-19 @ 22:56)  Blood Gas Venous - Lactate: 1.4 (02-19 @ 20:07)  Blood Gas Venous - Lactate: 1.0 (02-19 @ 19:33)        RADIOLOGY:  imaging below personally reviewed      < from: CT Abdomen and Pelvis w/ IV Cont (02.20.25 @ 10:16) >  IMPRESSION:  No focal collection.  No acute findings in the abdomen or pelvis.  < end of copied text >      < from: CT Head No Cont (02.19.25 @ 18:43) >  IMPRESSION:  1. No acute intracranial hemorrhage, mass effect, or midline shift. If   alteredmental status persists, consider further evaluation via MR   imaging to include DWI and ADC mapping techniques, provided there are no   contraindications.  2. 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 These findings were present on the prior study dated 1/25/2024.  < end of copied text >        < from: CT Angio Neck w/ IV Cont (02.19.25 @ 18:43) >  IMPRESSION:  (Limited diagnostic study secondary to suboptimal opacification of the   intracranial arterial vasculature).  CTA NECK:  No evidence of significant stenosis or occlusion.    CTA HEAD:  No large vessel occlusion, significant stenosis or vascular abnormality   identified.  < end of copied text >      < from: Xray Chest 1 View- PORTABLE-Urgent (02.19.25 @ 20:06) >  IMPRESSION:  Similar atelectasis in the right lung base.  No pleural effusion or pneumothorax.  < end of copied text >

## 2025-02-20 NOTE — H&P ADULT - ATTENDING COMMENTS
72yo M pmhx HTN, HLD, nonischemic cardiomyopathy, chronic systolic heart failure s/p HM2 LVAD (6/2017), s/p heart transplant from Hep. 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    Metabolic encephalopathy- Given SIRS, hx of immuosupression will need to exhaust all causes of infection.  -Panculture  -Empiric Abx with Zosyn and Vanco  -LP when safe off Eliquis  -CT Abd/Pelvis Negative  -Head CT negative  -Elevated CPK - r/o seizure. Neuro consult  -Transplant ID consult    Hyperkalemia -  -s/p temporizing measures in ED  -Hold ARB  -Appreciate renal consult    Cardiac Transplant  -Adjustment of cardiac transplant meds as per transplant team

## 2025-02-20 NOTE — H&P ADULT - PROBLEM SELECTOR PLAN 5
> f/u tacro evel   > Continue with home Pred 15mg qd  > Hold home Bactrim given hyperkalemia > f/u tacro evel   > Continue with home Pred 15mg qd  > Hold home Bactrim given hyperkalemia  > c/w home ASA > f/u tacro evel   > Continue with home Pred 15mg qd  > Hold home Bactrim given hyperkalemia  > c/w home ASA  > Home Meto nonischemic cardiomyopathy, chronic systolic heart failure s/p HM2 LVAD (6/2017), s/p heart transplant from Hep. C donor (treated) 2/23/18   - Home Tacrolimus dose 2mg BID, Pred 15mg qd  > Continue with home Pred 15mg qd  > Hold home Bactrim given hyperkalemia  > c/w home ASA  > Reduce home Metoprolol from 200mg to 100qd nonischemic cardiomyopathy, chronic systolic heart failure s/p HM2 LVAD (6/2017), s/p heart transplant from Hep. C donor (treated) 2/23/18   - Home Tacrolimus dose 2mg BID, Pred 15mg qd  > Continue with home Pred 15mg qd  > Hold home Bactrim given hyperkalemia  > c/w home ASA  > Reduce home Metoprolol from 200mg to 100qd  > Transplant ID reccomendations hx of hemolytic anemia, follows with Dr. Rosario as outpatient  - remains on Prednisone 15 mg PO QD   - h/o Consult  - Monitor H&H daily

## 2025-02-20 NOTE — H&P ADULT - PROBLEM SELECTOR PLAN 1
Patient baseline AOx3, AOx1 currently, likely iso of infection vs metabolic derangements (uremic encephelopathy)   -  CTH and angio w/o hemmorhage or stenosis   - Infectious workup below and empiric Zosyn  - TSH wnl, f/u B12   - Fall precautions and dsphagia screen Patient baseline AOx3, AOx1 currently, likely iso of infection vs metabolic derangements (uremic encephelopathy)   -  CTH and angio w/o hemmorhage or stenosis   - Infectious workup below and empiric Zosyn  - TSH wnl, f/u B12, CK, u-tox   - Fall precautions and dysphagia screen  > Spot EEG Patient baseline AOx3, AOx1 currently, likely iso of infection vs metabolic derangements (uremic encephelopathy)   -  CTH and angio w/o hemorrhage or stenosis   > Infectious work up as below   - TSH wnl, CK 2200,  f/u B12, CK, u-tox   - Fall precautions and dysphagia screen  > Spot EEG to r/o seizure due to elevated CK and hx of seizures Patient baseline AOx3, AOx1 currently, likely iso of infection vs metabolic derangements (uremic encephelopathy)   -  CTH and angio w/o hemorrhage or stenosis   > Infectious work up as below   - TSH wnl, CK 2200,  f/u B12  - Fall precautions and dysphagia screen  > Spot EEG to r/o seizure due to elevated CK and hx of seizures  > Neurology consult

## 2025-02-20 NOTE — CONSULT NOTE ADULT - PROBLEM SELECTOR RECOMMENDATION 8
- was on Eliquis 5 mg PO BID at home for hx of of afib and IJ thrombi  - currently holding in anticipation for LP as stated above

## 2025-02-21 ENCOUNTER — LABORATORY RESULT (OUTPATIENT)
Age: 75
End: 2025-02-21

## 2025-02-21 LAB
ALBUMIN SERPL ELPH-MCNC: 3.8 G/DL
ALP BLD-CCNC: 51 U/L
ALT SERPL-CCNC: 23 U/L
ANION GAP SERPL CALC-SCNC: 13 MMOL/L
AST SERPL-CCNC: 64 U/L
BILIRUB SERPL-MCNC: 0.8 MG/DL
BUN SERPL-MCNC: 64 MG/DL
CALCIUM SERPL-MCNC: 8.8 MG/DL
CHLORIDE SERPL-SCNC: 107 MMOL/L
CO2 SERPL-SCNC: 19 MMOL/L
CREAT SERPL-MCNC: 1.82 MG/DL
EGFR: 38 ML/MIN/1.73M2
GLUCOSE SERPL-MCNC: 85 MG/DL
LDH SERPL-CCNC: 498 U/L
POTASSIUM SERPL-SCNC: 7 MMOL/L
PROT SERPL-MCNC: 5.9 G/DL
SODIUM SERPL-SCNC: 139 MMOL/L

## 2025-02-21 NOTE — PROGRESS NOTE ADULT - ASSESSMENT
74yo M pmhx HTN, HLD, nonischemic cardiomyopathy, chronic systolic heart failure s/p HM2 LVAD (6/2017), s/p heart transplant from Hep. 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 Pt here with change in MS, hyperkalemia and juan f.   Patient was given Calcium Gluconate 2g, D50/Insulin 5 unit, 40 mg IV lasix, Lokelma 10mg.   Vancomycin and Zosyn       Hyperkalemia:  Pt with K level of 6.7 that improved to 6.1 after medical management with additional dose of insulin given.   K 5.6 - On Lokelma 10 TID.   Monitor labs.     Stage 3 chronic kidney disease:  Patient with known history of CKD. Upon HIE review, Scr ranged from 1.3-2.1 in 2023. Most recent Scr was 1.75 on 1/30/24.   Scr is worsening now.  2.49 today.   CPK is down trending. - Pt denies any seizure before coming to hospital. (Though not reliable history)  Transplant ID reccs appreciated.   Pt has respiratory acidosis.   neurology is on board.     PLAN:  Resp acidosis management as per primary team.   Pt has urine ketones - check BHB.   Monitor labs, daily wts and I/Os.   Check tacro trough daily.   Check urine lytes.  on empirical Abx.   Dose medications as per eGFR for now.     wait for final reccs.  74yo M pmhx HTN, HLD, nonischemic cardiomyopathy, chronic systolic heart failure s/p HM2 LVAD (6/2017), s/p heart transplant from Hep. 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 Pt here with change in MS, hyperkalemia and juan f.   Patient was given Calcium Gluconate 2g, D50/Insulin 5 unit, 40 mg IV lasix, Lokelma 10mg.   Vancomycin and Zosyn       Hyperkalemia:  Pt with K level of 6.7 that improved to 6.1 after medical management with additional dose of insulin given.   K 5.6 - On Lokelma 10 TID.   Monitor labs.     Stage 3 chronic kidney disease:  Patient with known history of CKD. Upon HIE review, Scr ranged from 1.3-2.1 in 2023. Most recent Scr was 1.75 on 1/30/24.   Scr is worsening now.  2.49 today.   CPK is down trending. - Pt denies any seizure before coming to hospital. (Though not reliable history)  Transplant ID reccs appreciated.   Pt has respiratory acidosis.   neurology is on board.     PLAN:  Resp acidosis management as per primary team.   Pt has urine ketones - check BHB.   Monitor labs, daily wts and I/Os.   Check tacro trough daily.   Check urine lytes.  on empirical Abx.   Dose medications as per eGFR for now.

## 2025-02-21 NOTE — CONSULT NOTE ADULT - SUBJECTIVE AND OBJECTIVE BOX
Patient is a 74y old  Male who presents with a chief complaint of Lethargy (21 Feb 2025 09:19)      HPI:  75yo M pmhx HTN, HLD, nonischemic cardiomyopathy, chronic systolic heart failure s/p HM2 LVAD (6/2017), s/p heart transplant from Hep. 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 On the phone, the patient's godbrother mentioned that on 2/18, the patient was in the car with his godbrother and was disoriented asking to get off on the road 4 blocks before his house. In addition, a caretaker stated that when she spoke to Mr. Hameed on the phone at 11am on 2/18, he was doing well. However, when the caretaker visited 2 hours later at 1pm, the patient was disoriented and felt his legs were heavy. This prompted the patient to come to the hospital.     In the ED: Hypothermic 91.4, HR 80, /60, RA   Patient noted to be Hyperkalemic (6.2) with Mixed respiratory and metabolic acidosis pH 7.2. - Patient was given Calcium Gluconate 2g, D50/Insulin 5 unit, 40 mg IV lasix, Lokelma 10mg.   Vancomycin and Zosyn  (20 Feb 2025 07:21)      PAST MEDICAL & SURGICAL HISTORY:  HTN      SVT (Supraventricular Tachycardia)      Non-Ischemic Cardiomyopathy  now s/p transplant 2018      GIB (gastrointestinal bleeding)      Hepatitis C virus      DVT of upper extremity (deep vein thrombosis)      Former smoker      HLD (hyperlipidemia)      Knee pain, right      H/O autoimmune hemolytic anemia      H/O hemolytic anemia      Atrial fibrillation      Status post left hip replacement      H/O heart transplant  2/2018          MEDICATIONS  (STANDING):  aspirin  chewable 81 milliGRAM(s) Oral daily  atorvastatin 10 milliGRAM(s) Oral at bedtime  cholecalciferol 1000 Unit(s) Oral daily  dextrose 5%. 1000 milliLiter(s) (100 mL/Hr) IV Continuous <Continuous>  dextrose 5%. 1000 milliLiter(s) (50 mL/Hr) IV Continuous <Continuous>  dextrose 50% Injectable 25 Gram(s) IV Push once  dextrose 50% Injectable 12.5 Gram(s) IV Push once  dextrose 50% Injectable 25 Gram(s) IV Push once  glucagon  Injectable 1 milliGRAM(s) IntraMuscular once  heparin   Injectable 5000 Unit(s) SubCutaneous every 8 hours  insulin lispro (ADMELOG) corrective regimen sliding scale   SubCutaneous three times a day before meals  insulin lispro (ADMELOG) corrective regimen sliding scale   SubCutaneous at bedtime  lactated ringers. 1000 milliLiter(s) (70 mL/Hr) IV Continuous <Continuous>  meropenem  IVPB 1000 milliGRAM(s) IV Intermittent every 12 hours  meropenem  IVPB      metoprolol succinate  milliGRAM(s) Oral daily  nystatin    Suspension 194471 Unit(s) Oral four times a day  predniSONE   Tablet 15 milliGRAM(s) Oral daily  sodium zirconium cyclosilicate 10 Gram(s) Oral three times a day  tacrolimus 1 milliGRAM(s) Oral two times a day  valGANciclovir 450 milliGRAM(s) Oral <User Schedule>    MEDICATIONS  (PRN):  acetaminophen     Tablet .. 650 milliGRAM(s) Oral every 6 hours PRN Temp greater or equal to 38C (100.4F), Mild Pain (1 - 3)  dextrose Oral Gel 15 Gram(s) Oral once PRN Blood Glucose LESS THAN 70 milliGRAM(s)/deciliter  melatonin 3 milliGRAM(s) Oral at bedtime PRN Insomnia      Allergies    No Known Allergies    Intolerances        VITALS:    Vital Signs Last 24 Hrs  T(C): 36.5 (21 Feb 2025 11:36), Max: 37 (21 Feb 2025 04:46)  T(F): 97.7 (21 Feb 2025 11:36), Max: 98.6 (21 Feb 2025 04:46)  HR: 100 (21 Feb 2025 11:36) (64 - 105)  BP: 92/62 (21 Feb 2025 11:36) (92/62 - 106/70)  BP(mean): 85 (21 Feb 2025 08:53) (85 - 85)  RR: 18 (21 Feb 2025 11:36) (18 - 18)  SpO2: 97% (21 Feb 2025 11:36) (92% - 97%)    Parameters below as of 21 Feb 2025 11:36  Patient On (Oxygen Delivery Method): room air        LABS:                          12.5   17.85 )-----------( 176      ( 21 Feb 2025 09:38 )             40.2       02-21    141  |  102  |  80[H]  ----------------------------<  116[H]  4.4   |  20[L]  |  2.75[H]    Ca    9.1      21 Feb 2025 09:36  Phos  6.2     02-21  Mg     2.4     02-21    TPro  6.2  /  Alb  3.0[L]  /  TBili  0.8  /  DBili  x   /  AST  52[H]  /  ALT  23  /  AlkPhos  62  02-21      CAPILLARY BLOOD GLUCOSE      POCT Blood Glucose.: 126 mg/dL (21 Feb 2025 12:15)  POCT Blood Glucose.: 123 mg/dL (21 Feb 2025 08:28)  POCT Blood Glucose.: 108 mg/dL (20 Feb 2025 21:42)  POCT Blood Glucose.: 97 mg/dL (20 Feb 2025 17:33)      PT/INR - ( 21 Feb 2025 09:38 )   PT: 20.7 sec;   INR: 1.81 ratio         PTT - ( 21 Feb 2025 09:38 )  PTT:36.6 sec    LOWER EXTREMITY PHYSICAL EXAM:    Vascular: DP/PT 0/4, B/L, CFT delayed B/L, Temperature gradient WNL, B/L.   Neuro: unable to assess.  Skin: left plantar forefoot intact blister with no signs of infection, toenails thickened elongated dystrophic with subungual debris, incruvated painful boarders x10

## 2025-02-21 NOTE — PROGRESS NOTE ADULT - PROBLEM SELECTOR PLAN 11
- Fluids: None  - Electrolytes: Will replete to maintain K>4, Phos>3, and Mag>2  - Nutrition: Low K diet, regular   - Activity: PT   - DVT Prophylaxis: on Eliquis on hold - SCDs  - Stress Ulcer/GI Prophylaxis:   - Disposition: PT pending Extremities appear cool and dry   - Local wound care by podiatry   > Podiatry recommendations - pending SHANEKA studies, consider vascular evaluation if abnormal

## 2025-02-21 NOTE — PROGRESS NOTE ADULT - PROBLEM SELECTOR PLAN 2
- noted to be hyperkalemic on labs upon admission  - Nephrology following  - remains on Lokelma TID (of note was on veltassa at home)   - trend twice daily.

## 2025-02-21 NOTE — ADVANCED PRACTICE NURSE CONSULT - ASSESSMENT
Arrived on [address] on 2/21/25. Patient was awake, but able to engage in light conversation verbally. Able to nod his head yes / no to questions. Patient receiving EEG. In with DIXON Jaeger at bed side.    Examination of the right arm reveals complete epidermal exposure due to marked edema/swelling. Measuring approximately 4.5 cm x 8.0 cm x 0.0 cm. No skin tears or other discrete wounds are noted within the area of exposed epidermis. Peripheral pulses are palpable. Sensation is intact in the affected extremity. The exposed epidermis was dressed with Adaptic Touch semi-permeable dressing and covered with a dry protective dressing. Elevation of the right arm is recommended to help reduce swelling.     Patient presents with fissures on the heels bilaterally. No deep tissue injury or changes in skin color are noted. The patient also exhibits elongated toenails which are causing impingement on adjacent digits, increasing the risk of secondary wounds. Recommended Sween 24 moisturizer to treat or prevent dry, rough, scaly, itchy skin and minor skin irritations. Will recommend Podiatric consult for nail care. Patient wearing right & left foot Complete Cair Offloading Boots to help maintain neutral alignment of the foot and to relieve pressure on the heels, suspending the heels to prevent excessive pressure and skin breakdown.     When the consult was completed, patient's bed lowered to safe position, with side rails up, and call bell within reach. Discussed plan of care with DIXON Jaeger.

## 2025-02-21 NOTE — CONSULT NOTE ADULT - SUBJECTIVE AND OBJECTIVE BOX
HPI:  75yo M pmhx HTN, HLD, nonischemic cardiomyopathy, chronic systolic heart failure s/p HM2 LVAD (6/2017), s/p heart transplant from Hep. 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 . On the phone, the patient's godbrother mentioned that on 2/18, the patient was in the car with his godbrother and was disoriented asking to get off on the road 4 blocks before his house. In addition, a caretaker stated that when she spoke to Mr. Hameed on the phone at 11am on 2/18, he was doing well. However, when the caretaker visited 2 hours later at 1pm, the patient was disoriented and felt his legs were heavy. This prompted the patient to come to the hospital.     In the ED: Hypothermic 91.4, HR 80, /60, RA   Patient noted to be Hyperkalemic (6.2) with Mixed respiratory and metabolic acidosis pH 7.2. - Patient was given Calcium Gluconate 2g, D50/Insulin 5 unit, 40 mg IV lasix, Lokelma 10mg.   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  - Unclear source of infection - RUQ sono ordered, CT A/P ordered. Today with hyperkalemia, NORMAN (holding ARB), on  tacrolimus 2mg BID, pred 15mg qD, holding home bactrim 2/2 hyperk.    DERM HPI: Consulted today for b/l bullae that were noticed on the arms yesterday. Of note, Pt had RUE swelling and possible infiltrated IV yesterday. Pt had R upper extremity u/s yesterday which showed:  No acute DVT of the right upper extremity. Apparent partial filling defect of right innominate vein likely corresponds to chronic findings on recent chest CT. There is edema of the superficial soft tissues of the right upper extremity. No L upper arm u/s performed. 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 Currently on subq heparin (off home eliquis in preparation for LP on Monday). Pt A&O x 1, not able to answer questions regarding above. Does deny any sx including pruritus or pain.     PAST MEDICAL & SURGICAL HISTORY:  HTN      SVT (Supraventricular Tachycardia)      Non-Ischemic Cardiomyopathy  now s/p transplant 2018      GIB (gastrointestinal bleeding)      Hepatitis C virus      DVT of upper extremity (deep vein thrombosis)      Former smoker      HLD (hyperlipidemia)      Knee pain, right      H/O autoimmune hemolytic anemia      H/O hemolytic anemia      Atrial fibrillation      Status post left hip replacement      H/O heart transplant  2/2018        ROS:  As above    MEDICATIONS  (STANDING):  aspirin  chewable 81 milliGRAM(s) Oral daily  atorvastatin 10 milliGRAM(s) Oral at bedtime  cholecalciferol 1000 Unit(s) Oral daily  dextrose 5%. 1000 milliLiter(s) (100 mL/Hr) IV Continuous <Continuous>  dextrose 5%. 1000 milliLiter(s) (50 mL/Hr) IV Continuous <Continuous>  dextrose 50% Injectable 25 Gram(s) IV Push once  dextrose 50% Injectable 12.5 Gram(s) IV Push once  dextrose 50% Injectable 25 Gram(s) IV Push once  glucagon  Injectable 1 milliGRAM(s) IntraMuscular once  heparin   Injectable 5000 Unit(s) SubCutaneous every 8 hours  insulin lispro (ADMELOG) corrective regimen sliding scale   SubCutaneous three times a day before meals  insulin lispro (ADMELOG) corrective regimen sliding scale   SubCutaneous at bedtime  lactated ringers. 1000 milliLiter(s) (100 mL/Hr) IV Continuous <Continuous>  meropenem  IVPB 1000 milliGRAM(s) IV Intermittent every 12 hours  meropenem  IVPB      metoprolol succinate  milliGRAM(s) Oral daily  nystatin    Suspension 208187 Unit(s) Oral four times a day  predniSONE   Tablet 15 milliGRAM(s) Oral daily  sodium zirconium cyclosilicate 10 Gram(s) Oral three times a day  tacrolimus 1 milliGRAM(s) Oral two times a day  valGANciclovir 450 milliGRAM(s) Oral <User Schedule>    MEDICATIONS  (PRN):  acetaminophen     Tablet .. 650 milliGRAM(s) Oral every 6 hours PRN Temp greater or equal to 38C (100.4F), Mild Pain (1 - 3)  dextrose Oral Gel 15 Gram(s) Oral once PRN Blood Glucose LESS THAN 70 milliGRAM(s)/deciliter  melatonin 3 milliGRAM(s) Oral at bedtime PRN Insomnia      Allergies    No Known Allergies    Intolerances        SOCIAL HISTORY:  Lives with godbrother. Ambulates with Cane.      FAMILY HISTORY:  No pertinent family history in first degree relatives        Vital Signs Last 24 Hrs  T(C): 36.5 (21 Feb 2025 11:36), Max: 37 (21 Feb 2025 04:46)  T(F): 97.7 (21 Feb 2025 11:36), Max: 98.6 (21 Feb 2025 04:46)  HR: 100 (21 Feb 2025 11:36) (64 - 105)  BP: 92/62 (21 Feb 2025 11:36) (92/62 - 106/70)  BP(mean): 85 (21 Feb 2025 08:53) (85 - 85)  RR: 18 (21 Feb 2025 11:36) (18 - 18)  SpO2: 97% (21 Feb 2025 11:36) (92% - 97%)    Parameters below as of 21 Feb 2025 11:36  Patient On (Oxygen Delivery Method): room air      PHYSICAL EXAM:   pt lethargic, A&O x 1    Of note on skin exam:   L distal arm with few flaccid intact bullae, 2 superficial erosions   LABS:                        12.5   17.85 )-----------( 176      ( 21 Feb 2025 09:38 )             40.2     02-21    141  |  102  |  80[H]  ----------------------------<  116[H]  4.4   |  20[L]  |  2.75[H]    Ca    9.1      21 Feb 2025 09:36  Phos  6.2     02-21  Mg     2.4     02-21    TPro  6.2  /  Alb  3.0[L]  /  TBili  0.8  /  DBili  x   /  AST  52[H]  /  ALT  23  /  AlkPhos  62  02-21    PT/INR - ( 21 Feb 2025 09:38 )   PT: 20.7 sec;   INR: 1.81 ratio         PTT - ( 21 Feb 2025 09:38 )  PTT:36.6 sec  Urinalysis Basic - ( 21 Feb 2025 09:36 )    Color: x / Appearance: x / SG: x / pH: x  Gluc: 116 mg/dL / Ketone: x  / Bili: x / Urobili: x   Blood: x / Protein: x / Nitrite: x   Leuk Esterase: x / RBC: x / WBC x   Sq Epi: x / Non Sq Epi: x / Bacteria: x        RADIOLOGY & ADDITIONAL STUDIES: HPI:  73yo M pmhx HTN, HLD, nonischemic cardiomyopathy, chronic systolic heart failure s/p HM2 LVAD (6/2017), s/p heart transplant from Hep. 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 . On the phone, the patient's godbrother mentioned that on 2/18, the patient was in the car with his godbrother and was disoriented asking to get off on the road 4 blocks before his house. In addition, a caretaker stated that when she spoke to Mr. Hameed on the phone at 11am on 2/18, he was doing well. However, when the caretaker visited 2 hours later at 1pm, the patient was disoriented and felt his legs were heavy. This prompted the patient to come to the hospital.     In the ED: Hypothermic 91.4, HR 80, /60, RA   Patient noted to be Hyperkalemic (6.2) with Mixed respiratory and metabolic acidosis pH 7.2. - Patient was given Calcium Gluconate 2g, D50/Insulin 5 unit, 40 mg IV lasix, Lokelma 10mg.   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  - Unclear source of infection - RUQ sono ordered, CT A/P ordered. Today with hyperkalemia, NORMAN (holding ARB), on  tacrolimus 2mg BID, pred 15mg qD, holding home bactrim 2/2 hyperk.    DERM HPI: Consulted today for b/l bullae that were noticed on the arms yesterday. Of note, Pt had RUE swelling and possible infiltrated IV yesterday. Pt had R upper extremity u/s yesterday which showed:  No acute DVT of the right upper extremity. Apparent partial filling defect of right innominate vein likely corresponds to chronic findings on recent chest CT. There is edema of the superficial soft tissues of the right upper extremity. No L upper arm u/s performed. 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 Currently on subq heparin (off home eliquis in preparation for LP on Monday). Pt A&O x 1, not able to answer questions regarding above. Does deny any sx including pruritus or pain.     PAST MEDICAL & SURGICAL HISTORY:  HTN      SVT (Supraventricular Tachycardia)      Non-Ischemic Cardiomyopathy  now s/p transplant 2018      GIB (gastrointestinal bleeding)      Hepatitis C virus      DVT of upper extremity (deep vein thrombosis)      Former smoker      HLD (hyperlipidemia)      Knee pain, right      H/O autoimmune hemolytic anemia      H/O hemolytic anemia      Atrial fibrillation      Status post left hip replacement      H/O heart transplant  2/2018        ROS:  As above    MEDICATIONS  (STANDING):  aspirin  chewable 81 milliGRAM(s) Oral daily  atorvastatin 10 milliGRAM(s) Oral at bedtime  cholecalciferol 1000 Unit(s) Oral daily  dextrose 5%. 1000 milliLiter(s) (100 mL/Hr) IV Continuous <Continuous>  dextrose 5%. 1000 milliLiter(s) (50 mL/Hr) IV Continuous <Continuous>  dextrose 50% Injectable 25 Gram(s) IV Push once  dextrose 50% Injectable 12.5 Gram(s) IV Push once  dextrose 50% Injectable 25 Gram(s) IV Push once  glucagon  Injectable 1 milliGRAM(s) IntraMuscular once  heparin   Injectable 5000 Unit(s) SubCutaneous every 8 hours  insulin lispro (ADMELOG) corrective regimen sliding scale   SubCutaneous three times a day before meals  insulin lispro (ADMELOG) corrective regimen sliding scale   SubCutaneous at bedtime  lactated ringers. 1000 milliLiter(s) (100 mL/Hr) IV Continuous <Continuous>  meropenem  IVPB 1000 milliGRAM(s) IV Intermittent every 12 hours  meropenem  IVPB      metoprolol succinate  milliGRAM(s) Oral daily  nystatin    Suspension 889186 Unit(s) Oral four times a day  predniSONE   Tablet 15 milliGRAM(s) Oral daily  sodium zirconium cyclosilicate 10 Gram(s) Oral three times a day  tacrolimus 1 milliGRAM(s) Oral two times a day  valGANciclovir 450 milliGRAM(s) Oral <User Schedule>    MEDICATIONS  (PRN):  acetaminophen     Tablet .. 650 milliGRAM(s) Oral every 6 hours PRN Temp greater or equal to 38C (100.4F), Mild Pain (1 - 3)  dextrose Oral Gel 15 Gram(s) Oral once PRN Blood Glucose LESS THAN 70 milliGRAM(s)/deciliter  melatonin 3 milliGRAM(s) Oral at bedtime PRN Insomnia      Allergies    No Known Allergies    Intolerances        SOCIAL HISTORY:  Lives with godbrother. Ambulates with Cane.      FAMILY HISTORY:  No pertinent family history in first degree relatives        Vital Signs Last 24 Hrs  T(C): 36.5 (21 Feb 2025 11:36), Max: 37 (21 Feb 2025 04:46)  T(F): 97.7 (21 Feb 2025 11:36), Max: 98.6 (21 Feb 2025 04:46)  HR: 100 (21 Feb 2025 11:36) (64 - 105)  BP: 92/62 (21 Feb 2025 11:36) (92/62 - 106/70)  BP(mean): 85 (21 Feb 2025 08:53) (85 - 85)  RR: 18 (21 Feb 2025 11:36) (18 - 18)  SpO2: 97% (21 Feb 2025 11:36) (92% - 97%)    Parameters below as of 21 Feb 2025 11:36  Patient On (Oxygen Delivery Method): room air      PHYSICAL EXAM:   pt lethargic, A&O x 1    Of note on skin exam:   L distal arm with few small flaccid intact bullae, 2 superficially eroded bullae  R distal arm with one tense intact bullae with no surrounding erythema   No other bullae noted elsewhere    LABS:                        12.5   17.85 )-----------( 176      ( 21 Feb 2025 09:38 )             40.2     02-21    141  |  102  |  80[H]  ----------------------------<  116[H]  4.4   |  20[L]  |  2.75[H]    Ca    9.1      21 Feb 2025 09:36  Phos  6.2     02-21  Mg     2.4     02-21    TPro  6.2  /  Alb  3.0[L]  /  TBili  0.8  /  DBili  x   /  AST  52[H]  /  ALT  23  /  AlkPhos  62  02-21    PT/INR - ( 21 Feb 2025 09:38 )   PT: 20.7 sec;   INR: 1.81 ratio         PTT - ( 21 Feb 2025 09:38 )  PTT:36.6 sec  Urinalysis Basic - ( 21 Feb 2025 09:36 )    Color: x / Appearance: x / SG: x / pH: x  Gluc: 116 mg/dL / Ketone: x  / Bili: x / Urobili: x   Blood: x / Protein: x / Nitrite: x   Leuk Esterase: x / RBC: x / WBC x   Sq Epi: x / Non Sq Epi: x / Bacteria: x        RADIOLOGY & ADDITIONAL STUDIES:

## 2025-02-21 NOTE — PROGRESS NOTE ADULT - PROBLEM SELECTOR PLAN 5
hx of hemolytic anemia, follows with Dr. Rosario as outpatient  - remains on Prednisone 15 mg PO QD   - h/o Consult  - Monitor H&H daily hx of hemolytic anemia, follows with Dr. Rosario as outpatient  - remains on Prednisone 15 mg PO QD     > h/o Consult  > Monitor H&H daily hx of hemolytic anemia, follows with Dr. Rosario as outpatient  - remains on Prednisone 15 mg PO QD   > h/o Consult  > Monitor H&H daily

## 2025-02-21 NOTE — ADVANCED PRACTICE NURSE CONSULT - RECOMMEDATIONS
Impression    Right Arm - Wound 2/2 Swelling  B/L Heels - Fissures - Appreciate Podiatric Consult    Recommendations    1. Right Arm - Swelling  Topical Therapy - Cleanse with normal saline 0.9%, pat dry, and apply Adaptic Touch semi-permeable dressing to wound bed. Cover with dry protective dressing. Frequency: QOD or PRN soiling  Elevation of the right arm is recommended to help reduce swelling    2. B/L Heels - Fissures & Elongated Toe nails.    Will appreciate PODIATRY Recommendation     Elevate heels; apply Complete Cair air fluidized boots; ensure that the soles of the feet are not resting on the foot board of the bed.      Topical therapy - Recommended Sween 24 Moisturizing Cream to be applied to the affected areas [frequency; twice daily] to hydrate and soften the skin.

## 2025-02-21 NOTE — PROGRESS NOTE ADULT - PROBLEM SELECTOR PLAN 8
- was on Eliquis 5 mg PO BID at home for hx of of afib and IJ thrombi  - currently holding in anticipation for LP as stated above.

## 2025-02-21 NOTE — PROGRESS NOTE ADULT - PROBLEM SELECTOR PLAN 6
nonischemic cardiomyopathy, chronic systolic heart failure s/p HM2 LVAD (6/2017), s/p heart transplant from Hep. C donor (treated) 2/23/18   - Home Tacrolimus dose 2mg BID, Pred 15mg qd  > Continue with home Pred 15mg qd  > Hold home Bactrim given hyperkalemia  > c/w home ASA, Atorvastatin (therapeutic interchange)   > c/w home Metoprolol from 200mg with hold parameters   > Tacro level daily : home regimen 2mg BID, c/w 1mg BID  Note:  intolerant of Cellcept due to leukopenia nonischemic cardiomyopathy, chronic systolic heart failure s/p HM2 LVAD (6/2017), s/p heart transplant from Hep. C donor (treated) 2/23/18   - Home Tacrolimus dose 2mg BID, Pred 15mg qd    > Continue with home Pred 15mg qd  > Hold home Bactrim given hyperkalemia  > c/w home ASA, Atorvastatin (therapeutic interchange)   > c/w home Metoprolol from 200mg with hold parameters   > Tacro level daily : home regimen 2mg BID, c/w 1mg BID  Note:  intolerant of Cellcept due to leukopenia Nonischemic cardiomyopathy, chronic systolic heart failure s/p HM2 LVAD (6/2017), s/p heart transplant from Hep. C donor (treated) 2/23/18   - Home Tacrolimus dose 2mg BID, Pred 15mg qd    > Continue with home Pred 15mg qd  > Hold home Bactrim given hyperkalemia - can consider Atovaquone  > c/w home ASA, Atorvastatin (therapeutic interchange)   > c/w home Metoprolol from 200mg with hold parameters   > Tacro level daily : home regimen 2mg BID, c/w 1mg BID  Note:  intolerant of Cellcept due to leukopenia

## 2025-02-21 NOTE — PROGRESS NOTE ADULT - ATTENDING COMMENTS
74yo M pmhx HTN, HLD, nonischemic cardiomyopathy, chronic systolic heart failure s/p HM2 LVAD (6/2017), s/p heart transplant from Hep. 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    Metabolic encephalopathy- Given SIRS, hx of immuosupression will need to exhaust all causes of infection.  -BCx with GNR   -Discussed with transplant ID- DIALLO lucero ordered, placed on meropenem  -LP when safe off Eliquis- Monday  -CT Abd/Pelvis Negative  -Head CT negative  -Elevated CPK - r/o seizure. Neuro consult appreciated- awaid eeg results      Hyperkalemia -  -s/p temporizing measures in ED  -Hold ARB  -Appreciate renal consult  - monitor closely    Cardiac Transplant  -Adjustment of cardiac transplant meds as per transplant team

## 2025-02-21 NOTE — PROGRESS NOTE ADULT - PROBLEM SELECTOR PLAN 3
- noted to have NORMAN on admission  - Nephrology following, appreciate recommendations  - increase IVF rate to 100 cc/hr  - trend daily.

## 2025-02-21 NOTE — DISCHARGE NOTE PROVIDER - CARE PROVIDERS DIRECT ADDRESSES
[email or web address]

## 2025-02-21 NOTE — CHART NOTE - NSCHARTNOTEFT_GEN_A_CORE
IR consulted for clearance for midline placement by PICC team at bedside given patient's GFR of 23. IR spoke with Dr. Hutchins with nephrology - patient is approved for bedside midline placement.

## 2025-02-21 NOTE — PROGRESS NOTE ADULT - ASSESSMENT
75yo M pmhx HTN, HLD, nonischemic cardiomyopathy, s/p HM2 LVAD (6/2017),HM2 LVAD in June 2017 complicated by recurrent GI hemorrhage and possible pump thrombosis who underwent heart transplant on 2/23/18 with a hepatitis C positive donor w/ complicated post-transplant course including hemolytic anemia/Grayson's syndrome, viral pneumonia, recurrent falls and NORMAN presented to [facility] ER with AMS. Upon arrival was found to be hypothermic, hyperkalemic, noted to have a leukocytosis and was  admitted for further management. CTA head as well as CT A/P was unremarkable. He was found to have one positive blood culture for gram negative rods, remains on IV abx.  Patient currently remains A&Ox 1 and at times experiencing agitation. EEG has been unremarkable and will likely undergo LP early next week. Will continue to adjust his immunosuppression as needed

## 2025-02-21 NOTE — DISCHARGE NOTE PROVIDER - NSDCFUSCHEDAPPT_GEN_ALL_CORE_FT
Ricardo Clemens  [facility]  ENDOCRIN [address] [facility]  Scheduled Appointment: 02/24/2025    [facility]  OPHTHALM [address]  Scheduled Appointment: 03/12/2025    [facility]  HEARTFAIL [address] [facility]  Scheduled Appointment: 05/05/2025

## 2025-02-21 NOTE — PROGRESS NOTE ADULT - PROBLEM SELECTOR PLAN 2
T 91.4F, WBC 21 concerning for possible occult infection,   - U/A without LE or Nitrites, CXR without clear consolidation, Lower concern for meningitis at this moment  > Bcx 2/20 - GNR Ucx, MRSA, CMV, Crypto, ASCENCION virus  > c/w Meropenam 2/21- ), Hold off additional doses of Vancomycin   > c/w home Valganciclovir for ppx renally dosed   > CTAP with IV contrast - w/o source of infection, Porcelin gall bladder   > F/u CTC and RUQ U/S to complete infectious workup   > f/u infectious labs - Toxoplasma, EBV, Fungitell, Galactomannan, CMV, Cryptococcus, MRSA, ASCENCION virus   > Transplant ID recommendations   > LP pending Monday 2/24 T 91.4F, WBC 21 concerning for possible occult infection,   - U/A without LE or Nitrites, CXR without clear consolidation, Lower concern for meningitis at this moment    > Bcx 2/20 - GNR Ucx, MRSA, CMV, Crypto, ASCENCION virus  > c/w Meropenam (2/21- ), Hold off additional doses of Vancomycin   > c/w home Valganciclovir for ppx renally dosed   > CTAP with IV contrast - w/o source of infection, Porcelain gall bladder   > F/u CTC, RUQ U/S, Abd X-ray to complete infectious workup   > f/u infectious labs - Toxoplasma, EBV, Fungitell, Galactomannan, CMV, Cryptococcus, MRSA, ASCENCION virus   > Transplant ID recommendations   > LP pending Monday 2/24 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    > Bcx 2/20 w/ Pseudomonas koreensis, Ucx NGTD, repeat cultures 2/22 pending   > c/w Meropenam (2/21- ), Hold off additional doses of Vancomycin   > c/w home Valganciclovir for ppx renally dosed   > f/u infectious labs - Toxoplasma, EBV, Fungitell, Galactomannan, CMV, Cryptococcus, MRSA, ASCENCION virus, CMV, Crypto, ASCENCION virus  > CTC pending   > Transplant ID recommendations   > LP pending Monday 2/24

## 2025-02-21 NOTE — PHYSICAL THERAPY INITIAL EVALUATION ADULT - LEVEL OF INDEPENDENCE: GAIT, REHAB EVAL
Deferred as patient with poor static standing balance and refusing to utilize rolling walker or single point cane present at bedside.

## 2025-02-21 NOTE — PROGRESS NOTE ADULT - PROBLEM SELECTOR PLAN 4
Known hx of CKD 3, Cr 2.4   -  Scr ranged from 1.3-2.1 in 2023. Most recent Scr was 1.75 on 1/30/24. On admission, Scr was 1.9 and remains stable at 1.8. UA showed trace ketones.   > Hold home ARB for now   > Euvolemic - will hold home Bumex 0.5mg  > Concern for pre-renal etiology due to decrease PO intake - Maintenance IVF 75cc/h Known hx of CKD 3, Cr 2.4   -  Scr ranged from 1.3-2.1 in 2023. Most recent Scr was 1.75 on 1/30/24. On admission, Scr was 1.9 and remains stable at 1.8. UA showed trace ketones.     > Hold home ARB for now   > Euvolemic - will hold home Bumex 0.5mg  > Concern for pre-renal etiology due to decrease PO intake - Maintenance IVF 75cc/h Known hx of CKD 3, Cr 2.4   -  Scr ranged from 1.3-2.1 in 2023. Most recent Scr was 1.75 on 1/30/24. On admission, Scr was 1.9 and is worsening now at 2.49 today. UA showed trace ketones.     >Transplant ID recs appreciated.  > Hold home ARB for now   > Euvolemic - will hold home Bumex 0.5mg  > Concern for pre-renal etiology due to decrease PO intake - Maintenance IVF 75cc/h Known hx of CKD 3, Cr 2.4 uptrending  -  Scr ranged from 1.3-2.1 in 2023. Most recent Scr was 1.75 on 1/30/24. On admission, Scr was 1.9 and is worsening now at 2.49 today. UA showed trace ketones.     > Hold home ARB for now   > Euvolemic - will hold home Bumex 0.5mg  > Concern for pre-renal etiology due to decrease PO intake - Maintenance IVF 100cc/h

## 2025-02-21 NOTE — PROGRESS NOTE ADULT - SUBJECTIVE AND OBJECTIVE BOX
***************************************************************  Lavelle Dalton  (PGY2) Internal Medicine  On TEAMS  ***************************************************************    PROGRESS NOTE:     Patient is a 74y old  Male who presents with a chief complaint of Lethargy (20 Feb 2025 15:49)      INTERVAL EVENTS: Antibiotics switched to Meropenam, Received 1L of Fluids for hypotension   SUBJECTIVE / OVERNIGHT EVENTS: No acute overnight events. Patient was seen and examined by the bedside this AM.   OXYGEN: RA   TELEMETRY:     REVIEW OF SYSTEMS:  CONSTITUTIONAL: No weakness, fevers or chills  EYES/ENT: No visual changes;  No vertigo or throat pain   NECK: No pain or stiffness  RESPIRATORY: No cough, wheezing, hemoptysis; No shortness of breath  CARDIOVASCULAR: No chest pain or palpitations  GASTROINTESTINAL: No abdominal or epigastric pain. No nausea, vomiting, or hematemesis; No diarrhea or constipation. No melena or hematochezia.  GENITOURINARY: No dysuria, frequency or hematuria  NEUROLOGICAL: No numbness or weakness  SKIN: No itching, rashes      MEDICATIONS  (STANDING):  aspirin  chewable 81 milliGRAM(s) Oral daily  atorvastatin 10 milliGRAM(s) Oral at bedtime  cholecalciferol 1000 Unit(s) Oral daily  dextrose 5%. 1000 milliLiter(s) (100 mL/Hr) IV Continuous <Continuous>  dextrose 5%. 1000 milliLiter(s) (50 mL/Hr) IV Continuous <Continuous>  dextrose 50% Injectable 25 Gram(s) IV Push once  dextrose 50% Injectable 12.5 Gram(s) IV Push once  dextrose 50% Injectable 25 Gram(s) IV Push once  glucagon  Injectable 1 milliGRAM(s) IntraMuscular once  heparin   Injectable 5000 Unit(s) SubCutaneous every 8 hours  insulin lispro (ADMELOG) corrective regimen sliding scale   SubCutaneous three times a day before meals  insulin lispro (ADMELOG) corrective regimen sliding scale   SubCutaneous at bedtime  lactated ringers. 1000 milliLiter(s) (70 mL/Hr) IV Continuous <Continuous>  meropenem  IVPB 1000 milliGRAM(s) IV Intermittent every 12 hours  meropenem  IVPB      metoprolol succinate  milliGRAM(s) Oral daily  nystatin    Suspension 996202 Unit(s) Oral four times a day  predniSONE   Tablet 15 milliGRAM(s) Oral daily  sodium zirconium cyclosilicate 10 Gram(s) Oral three times a day  tacrolimus 1 milliGRAM(s) Oral two times a day  valGANciclovir 450 milliGRAM(s) Oral <User Schedule>    MEDICATIONS  (PRN):  acetaminophen     Tablet .. 650 milliGRAM(s) Oral every 6 hours PRN Temp greater or equal to 38C (100.4F), Mild Pain (1 - 3)  dextrose Oral Gel 15 Gram(s) Oral once PRN Blood Glucose LESS THAN 70 milliGRAM(s)/deciliter  melatonin 3 milliGRAM(s) Oral at bedtime PRN Insomnia      CAPILLARY BLOOD GLUCOSE      POCT Blood Glucose.: 108 mg/dL (20 Feb 2025 21:42)  POCT Blood Glucose.: 97 mg/dL (20 Feb 2025 17:33)  POCT Blood Glucose.: 96 mg/dL (20 Feb 2025 12:24)    I&O's Summary      PHYSICAL EXAM:  Vital Signs Last 24 Hrs  T(C): 37 (21 Feb 2025 04:46), Max: 37 (21 Feb 2025 04:46)  T(F): 98.6 (21 Feb 2025 04:46), Max: 98.6 (21 Feb 2025 04:46)  HR: 64 (21 Feb 2025 04:46) (56 - 100)  BP: 98/71 (21 Feb 2025 04:46) (92/67 - 150/70)  BP(mean): --  RR: 18 (21 Feb 2025 04:46) (18 - 18)  SpO2: 97% (21 Feb 2025 04:46) (92% - 97%)    Parameters below as of 21 Feb 2025 04:46  Patient On (Oxygen Delivery Method): room air    General : NAD  CV: RRR no R/M/G  Lungs: CTAB  Abd : Soft, non-tender, non-distended  Extremities : No LE Edema  Neuro : A&Ox1-2    LABS:                        12.7   21.64 )-----------( 195      ( 20 Feb 2025 05:35 )             39.8     02-20    139  |  103  |  73[H]  ----------------------------<  96  5.6[H]   |  19[L]  |  2.49[H]    Ca    9.1      20 Feb 2025 21:38  Phos  4.8     02-20  Mg     1.9     02-20    TPro  6.1  /  Alb  3.2[L]  /  TBili  1.2  /  DBili  x   /  AST  68[H]  /  ALT  25  /  AlkPhos  54  02-19    PT/INR - ( 19 Feb 2025 17:29 )   PT: 20.2 sec;   INR: 1.77 ratio         PTT - ( 19 Feb 2025 17:29 )  PTT:39.3 sec      Urinalysis Basic - ( 20 Feb 2025 21:38 )    Color: x / Appearance: x / SG: x / pH: x  Gluc: 96 mg/dL / Ketone: x  / Bili: x / Urobili: x   Blood: x / Protein: x / Nitrite: x   Leuk Esterase: x / RBC: x / WBC x   Sq Epi: x / Non Sq Epi: x / Bacteria: x        Culture - Blood (collected 19 Feb 2025 17:16)  Source: .Blood Blood-Peripheral  Gram Stain (20 Feb 2025 18:18):    Growth in aerobic bottle: Gram Negative Rods  Preliminary Report (20 Feb 2025 18:19):    Growth in aerobic bottle: Gram Negative Rods    Direct identification is available within approximately 3-5    hours either by Blood Panel Multiplexed PCR or Direct    MALDI-TOF. Details: [email or web address]  Organism: Blood Culture PCR (20 Feb 2025 23:32)  Organism: Blood Culture PCR (20 Feb 2025 23:32)    Culture - Blood (collected 19 Feb 2025 17:00)  Source: .Blood Blood-Peripheral  Preliminary Report (20 Feb 2025 20:01):    No growth at 24 hours        COORDINATION OF CARE:  Care Discussed with Consultants/Other Providers [Y/N]:  Prior or Outpatient Records Reviewed [Y/N]: ***************************************************************  Lavelle Dalton  (PGY2) Internal Medicine  On TEAMS  ***************************************************************    PROGRESS NOTE:     Patient is a 74y old  Male who presents with a chief complaint of Lethargy (20 Feb 2025 15:49). No acute overnight events. Patient was seen this morning at bedside and did not endorse any complaints.       INTERVAL EVENTS: Antibiotics switched to Meropenam, Received 1L of Fluids for hypotension   SUBJECTIVE / OVERNIGHT EVENTS: No acute overnight events. Patient was seen and examined by the bedside this AM.   OXYGEN: RA   TELEMETRY:     REVIEW OF SYSTEMS:  CONSTITUTIONAL: No weakness, fevers or chills  EYES/ENT: No visual changes;  No vertigo or throat pain   NECK: No pain or stiffness  RESPIRATORY: No cough, wheezing, hemoptysis; No shortness of breath  CARDIOVASCULAR: No chest pain or palpitations  GASTROINTESTINAL: No abdominal or epigastric pain. No nausea, vomiting, or hematemesis; No diarrhea or constipation. No melena or hematochezia.  GENITOURINARY: No dysuria, frequency or hematuria  NEUROLOGICAL: No numbness or weakness  SKIN: No itching, rashes      MEDICATIONS  (STANDING):  aspirin  chewable 81 milliGRAM(s) Oral daily  atorvastatin 10 milliGRAM(s) Oral at bedtime  cholecalciferol 1000 Unit(s) Oral daily  dextrose 5%. 1000 milliLiter(s) (100 mL/Hr) IV Continuous <Continuous>  dextrose 5%. 1000 milliLiter(s) (50 mL/Hr) IV Continuous <Continuous>  dextrose 50% Injectable 25 Gram(s) IV Push once  dextrose 50% Injectable 12.5 Gram(s) IV Push once  dextrose 50% Injectable 25 Gram(s) IV Push once  glucagon  Injectable 1 milliGRAM(s) IntraMuscular once  heparin   Injectable 5000 Unit(s) SubCutaneous every 8 hours  insulin lispro (ADMELOG) corrective regimen sliding scale   SubCutaneous three times a day before meals  insulin lispro (ADMELOG) corrective regimen sliding scale   SubCutaneous at bedtime  lactated ringers. 1000 milliLiter(s) (70 mL/Hr) IV Continuous <Continuous>  meropenem  IVPB 1000 milliGRAM(s) IV Intermittent every 12 hours  meropenem  IVPB      metoprolol succinate  milliGRAM(s) Oral daily  nystatin    Suspension 644239 Unit(s) Oral four times a day  predniSONE   Tablet 15 milliGRAM(s) Oral daily  sodium zirconium cyclosilicate 10 Gram(s) Oral three times a day  tacrolimus 1 milliGRAM(s) Oral two times a day  valGANciclovir 450 milliGRAM(s) Oral <User Schedule>    MEDICATIONS  (PRN):  acetaminophen     Tablet .. 650 milliGRAM(s) Oral every 6 hours PRN Temp greater or equal to 38C (100.4F), Mild Pain (1 - 3)  dextrose Oral Gel 15 Gram(s) Oral once PRN Blood Glucose LESS THAN 70 milliGRAM(s)/deciliter  melatonin 3 milliGRAM(s) Oral at bedtime PRN Insomnia      CAPILLARY BLOOD GLUCOSE      POCT Blood Glucose.: 108 mg/dL (20 Feb 2025 21:42)  POCT Blood Glucose.: 97 mg/dL (20 Feb 2025 17:33)  POCT Blood Glucose.: 96 mg/dL (20 Feb 2025 12:24)    I&O's Summary      PHYSICAL EXAM:  Vital Signs Last 24 Hrs  T(C): 37 (21 Feb 2025 04:46), Max: 37 (21 Feb 2025 04:46)  T(F): 98.6 (21 Feb 2025 04:46), Max: 98.6 (21 Feb 2025 04:46)  HR: 64 (21 Feb 2025 04:46) (56 - 100)  BP: 98/71 (21 Feb 2025 04:46) (92/67 - 150/70)  BP(mean): --  RR: 18 (21 Feb 2025 04:46) (18 - 18)  SpO2: 97% (21 Feb 2025 04:46) (92% - 97%)    Parameters below as of 21 Feb 2025 04:46  Patient On (Oxygen Delivery Method): room air    General : NAD  CV: RRR no R/M/G  Lungs: CTAB  Abd : Soft, non-tender, non-distended  Extremities : No LE Edema  Neuro : A&Ox1-2    LABS:                        12.7   21.64 )-----------( 195      ( 20 Feb 2025 05:35 )             39.8     02-20    139  |  103  |  73[H]  ----------------------------<  96  5.6[H]   |  19[L]  |  2.49[H]    Ca    9.1      20 Feb 2025 21:38  Phos  4.8     02-20  Mg     1.9     02-20    TPro  6.1  /  Alb  3.2[L]  /  TBili  1.2  /  DBili  x   /  AST  68[H]  /  ALT  25  /  AlkPhos  54  02-19    PT/INR - ( 19 Feb 2025 17:29 )   PT: 20.2 sec;   INR: 1.77 ratio         PTT - ( 19 Feb 2025 17:29 )  PTT:39.3 sec      Urinalysis Basic - ( 20 Feb 2025 21:38 )    Color: x / Appearance: x / SG: x / pH: x  Gluc: 96 mg/dL / Ketone: x  / Bili: x / Urobili: x   Blood: x / Protein: x / Nitrite: x   Leuk Esterase: x / RBC: x / WBC x   Sq Epi: x / Non Sq Epi: x / Bacteria: x        Culture - Blood (collected 19 Feb 2025 17:16)  Source: .Blood Blood-Peripheral  Gram Stain (20 Feb 2025 18:18):    Growth in aerobic bottle: Gram Negative Rods  Preliminary Report (20 Feb 2025 18:19):    Growth in aerobic bottle: Gram Negative Rods    Direct identification is available within approximately 3-5    hours either by Blood Panel Multiplexed PCR or Direct    MALDI-TOF. Details: [email or web address]  Organism: Blood Culture PCR (20 Feb 2025 23:32)  Organism: Blood Culture PCR (20 Feb 2025 23:32)    Culture - Blood (collected 19 Feb 2025 17:00)  Source: .Blood Blood-Peripheral  Preliminary Report (20 Feb 2025 20:01):    No growth at 24 hours        COORDINATION OF CARE:  Care Discussed with Consultants/Other Providers [Y/N]:  Prior or Outpatient Records Reviewed [Y/N]: ***************************************************************  Lavelle Dalton  (PGY2) Internal Medicine  On TEAMS  ***************************************************************    PROGRESS NOTE:     Patient is a 74y old  Male who presents with a chief complaint of Lethargy (20 Feb 2025 15:49). No acute overnight events. Patient was seen this morning at bedside and did not endorse any complaints.       INTERVAL EVENTS: Antibiotics switched to Meropenam, Received 1L of Fluids for hypotension   SUBJECTIVE / OVERNIGHT EVENTS: No acute overnight events. Patient was seen and examined by the bedside this AM.   OXYGEN: RA   TELEMETRY:     REVIEW OF SYSTEMS:  CONSTITUTIONAL: No weakness, fevers or chills  EYES/ENT: No visual changes;  No vertigo or throat pain   NECK: No pain or stiffness  RESPIRATORY: No cough, wheezing, hemoptysis; No shortness of breath  CARDIOVASCULAR: No chest pain or palpitations  GASTROINTESTINAL: No abdominal or epigastric pain. No nausea, vomiting, or hematemesis; No diarrhea or constipation. No melena or hematochezia.  GENITOURINARY: No dysuria, frequency or hematuria  NEUROLOGICAL: No numbness or weakness  SKIN: No itching, rashes      MEDICATIONS  (STANDING):  aspirin  chewable 81 milliGRAM(s) Oral daily  atorvastatin 10 milliGRAM(s) Oral at bedtime  cholecalciferol 1000 Unit(s) Oral daily  dextrose 5%. 1000 milliLiter(s) (100 mL/Hr) IV Continuous <Continuous>  dextrose 5%. 1000 milliLiter(s) (50 mL/Hr) IV Continuous <Continuous>  dextrose 50% Injectable 25 Gram(s) IV Push once  dextrose 50% Injectable 12.5 Gram(s) IV Push once  dextrose 50% Injectable 25 Gram(s) IV Push once  glucagon  Injectable 1 milliGRAM(s) IntraMuscular once  heparin   Injectable 5000 Unit(s) SubCutaneous every 8 hours  insulin lispro (ADMELOG) corrective regimen sliding scale   SubCutaneous three times a day before meals  insulin lispro (ADMELOG) corrective regimen sliding scale   SubCutaneous at bedtime  lactated ringers. 1000 milliLiter(s) (70 mL/Hr) IV Continuous <Continuous>  meropenem  IVPB 1000 milliGRAM(s) IV Intermittent every 12 hours  meropenem  IVPB      metoprolol succinate  milliGRAM(s) Oral daily  nystatin    Suspension 740830 Unit(s) Oral four times a day  predniSONE   Tablet 15 milliGRAM(s) Oral daily  sodium zirconium cyclosilicate 10 Gram(s) Oral three times a day  tacrolimus 1 milliGRAM(s) Oral two times a day  valGANciclovir 450 milliGRAM(s) Oral <User Schedule>    MEDICATIONS  (PRN):  acetaminophen     Tablet .. 650 milliGRAM(s) Oral every 6 hours PRN Temp greater or equal to 38C (100.4F), Mild Pain (1 - 3)  dextrose Oral Gel 15 Gram(s) Oral once PRN Blood Glucose LESS THAN 70 milliGRAM(s)/deciliter  melatonin 3 milliGRAM(s) Oral at bedtime PRN Insomnia      CAPILLARY BLOOD GLUCOSE      POCT Blood Glucose.: 108 mg/dL (20 Feb 2025 21:42)  POCT Blood Glucose.: 97 mg/dL (20 Feb 2025 17:33)  POCT Blood Glucose.: 96 mg/dL (20 Feb 2025 12:24)    I&O's Summary      PHYSICAL EXAM:  Vital Signs Last 24 Hrs  T(C): 37 (21 Feb 2025 04:46), Max: 37 (21 Feb 2025 04:46)  T(F): 98.6 (21 Feb 2025 04:46), Max: 98.6 (21 Feb 2025 04:46)  HR: 64 (21 Feb 2025 04:46) (56 - 100)  BP: 98/71 (21 Feb 2025 04:46) (92/67 - 150/70)  BP(mean): --  RR: 18 (21 Feb 2025 04:46) (18 - 18)  SpO2: 97% (21 Feb 2025 04:46) (92% - 97%)    Parameters below as of 21 Feb 2025 04:46  Patient On (Oxygen Delivery Method): room air    Constitutional: NAD, comfortable in bed  HEENT: NC/AT, PERRLA, EOMI, no conjunctival pallor or scleral icterus, MMM  Neck: Supple, no JVD  Respiratory: CTA B/L. No w/r/r.   Cardiovascular: RRR, normal S1 and S2, no m/r/g.   Gastrointestinal: mildly distended, +BS, slight tenderness to palpation in the LUQ, LLQ, no guarding or rebound tenderness, no palpable masses  Extremities: wwp; no cyanosis, clubbing or edema. 2-3 bullae present on arms bilaterally.  Neurological: AAOx1 (self), no CN deficits, strength and sensation intact throughout.   Skin: . 2-3 bullae present on arms bilaterally with flaking, dry skin on lower extremities.      LABS:                        12.7   21.64 )-----------( 195      ( 20 Feb 2025 05:35 )             39.8     02-20    139  |  103  |  73[H]  ----------------------------<  96  5.6[H]   |  19[L]  |  2.49[H]    Ca    9.1      20 Feb 2025 21:38  Phos  4.8     02-20  Mg     1.9     02-20    TPro  6.1  /  Alb  3.2[L]  /  TBili  1.2  /  DBili  x   /  AST  68[H]  /  ALT  25  /  AlkPhos  54  02-19    PT/INR - ( 19 Feb 2025 17:29 )   PT: 20.2 sec;   INR: 1.77 ratio         PTT - ( 19 Feb 2025 17:29 )  PTT:39.3 sec      Urinalysis Basic - ( 20 Feb 2025 21:38 )    Color: x / Appearance: x / SG: x / pH: x  Gluc: 96 mg/dL / Ketone: x  / Bili: x / Urobili: x   Blood: x / Protein: x / Nitrite: x   Leuk Esterase: x / RBC: x / WBC x   Sq Epi: x / Non Sq Epi: x / Bacteria: x        Culture - Blood (collected 19 Feb 2025 17:16)  Source: .Blood Blood-Peripheral  Gram Stain (20 Feb 2025 18:18):    Growth in aerobic bottle: Gram Negative Rods  Preliminary Report (20 Feb 2025 18:19):    Growth in aerobic bottle: Gram Negative Rods    Direct identification is available within approximately 3-5    hours either by Blood Panel Multiplexed PCR or Direct    MALDI-TOF. Details: [email or web address]  Organism: Blood Culture PCR (20 Feb 2025 23:32)  Organism: Blood Culture PCR (20 Feb 2025 23:32)    Culture - Blood (collected 19 Feb 2025 17:00)  Source: .Blood Blood-Peripheral  Preliminary Report (20 Feb 2025 20:01):    No growth at 24 hours        COORDINATION OF CARE:  Care Discussed with Consultants/Other Providers [Y/N]:  Prior or Outpatient Records Reviewed [Y/N]: ***************************************************************  Lavelle Dalton  (PGY2) Internal Medicine  On TEAMS  ***************************************************************    PROGRESS NOTE:     Patient is a 74y old  Male who presents with a chief complaint of Lethargy (20 Feb 2025 15:49). No acute overnight events. Patient was seen at bedside this morning and did not endorse any complaints.       INTERVAL EVENTS: Antibiotics switched to Meropenam, Received 1L of Fluids for hypotension   SUBJECTIVE / OVERNIGHT EVENTS: No acute overnight events. Patient was seen and examined by the bedside this AM.   OXYGEN: RA   TELEMETRY:     REVIEW OF SYSTEMS:  CONSTITUTIONAL: No weakness, fevers or chills  EYES/ENT: No visual changes;  No vertigo or throat pain   NECK: No pain or stiffness  RESPIRATORY: No cough, wheezing, hemoptysis; No shortness of breath  CARDIOVASCULAR: No chest pain or palpitations  GASTROINTESTINAL: No abdominal or epigastric pain. No nausea, vomiting, or hematemesis; No diarrhea or constipation. No melena or hematochezia.  GENITOURINARY: No dysuria, frequency or hematuria  NEUROLOGICAL: No numbness or weakness  SKIN: No itching, rashes      MEDICATIONS  (STANDING):  aspirin  chewable 81 milliGRAM(s) Oral daily  atorvastatin 10 milliGRAM(s) Oral at bedtime  cholecalciferol 1000 Unit(s) Oral daily  dextrose 5%. 1000 milliLiter(s) (100 mL/Hr) IV Continuous <Continuous>  dextrose 5%. 1000 milliLiter(s) (50 mL/Hr) IV Continuous <Continuous>  dextrose 50% Injectable 25 Gram(s) IV Push once  dextrose 50% Injectable 12.5 Gram(s) IV Push once  dextrose 50% Injectable 25 Gram(s) IV Push once  glucagon  Injectable 1 milliGRAM(s) IntraMuscular once  heparin   Injectable 5000 Unit(s) SubCutaneous every 8 hours  insulin lispro (ADMELOG) corrective regimen sliding scale   SubCutaneous three times a day before meals  insulin lispro (ADMELOG) corrective regimen sliding scale   SubCutaneous at bedtime  lactated ringers. 1000 milliLiter(s) (70 mL/Hr) IV Continuous <Continuous>  meropenem  IVPB 1000 milliGRAM(s) IV Intermittent every 12 hours  meropenem  IVPB      metoprolol succinate  milliGRAM(s) Oral daily  nystatin    Suspension 697348 Unit(s) Oral four times a day  predniSONE   Tablet 15 milliGRAM(s) Oral daily  sodium zirconium cyclosilicate 10 Gram(s) Oral three times a day  tacrolimus 1 milliGRAM(s) Oral two times a day  valGANciclovir 450 milliGRAM(s) Oral <User Schedule>    MEDICATIONS  (PRN):  acetaminophen     Tablet .. 650 milliGRAM(s) Oral every 6 hours PRN Temp greater or equal to 38C (100.4F), Mild Pain (1 - 3)  dextrose Oral Gel 15 Gram(s) Oral once PRN Blood Glucose LESS THAN 70 milliGRAM(s)/deciliter  melatonin 3 milliGRAM(s) Oral at bedtime PRN Insomnia      CAPILLARY BLOOD GLUCOSE      POCT Blood Glucose.: 108 mg/dL (20 Feb 2025 21:42)  POCT Blood Glucose.: 97 mg/dL (20 Feb 2025 17:33)  POCT Blood Glucose.: 96 mg/dL (20 Feb 2025 12:24)    I&O's Summary      PHYSICAL EXAM:  Vital Signs Last 24 Hrs  T(C): 37 (21 Feb 2025 04:46), Max: 37 (21 Feb 2025 04:46)  T(F): 98.6 (21 Feb 2025 04:46), Max: 98.6 (21 Feb 2025 04:46)  HR: 64 (21 Feb 2025 04:46) (56 - 100)  BP: 98/71 (21 Feb 2025 04:46) (92/67 - 150/70)  BP(mean): --  RR: 18 (21 Feb 2025 04:46) (18 - 18)  SpO2: 97% (21 Feb 2025 04:46) (92% - 97%)    Parameters below as of 21 Feb 2025 04:46  Patient On (Oxygen Delivery Method): room air    Constitutional: NAD, comfortable in bed  HEENT: NC/AT, PERRLA, EOMI, no conjunctival pallor or scleral icterus, MMM  Neck: Supple, no JVD  Respiratory: CTA B/L. No w/r/r.   Cardiovascular: RRR, normal S1 and S2, no m/r/g.   Gastrointestinal: mildly distended, +BS, slight tenderness to palpation in the LUQ, LLQ, no guarding or rebound tenderness, no palpable masses  Extremities: wwp; no cyanosis, clubbing or edema. 2-3 bullae present on arms bilaterally.  Neurological: AAOx1 (self), no CN deficits, strength and sensation intact throughout.   Skin: 2-3 bullae present on arms bilaterally. Dry, flaking skin on lower extremities.      LABS:                        12.7   21.64 )-----------( 195      ( 20 Feb 2025 05:35 )             39.8     02-20    139  |  103  |  73[H]  ----------------------------<  96  5.6[H]   |  19[L]  |  2.49[H]    Ca    9.1      20 Feb 2025 21:38  Phos  4.8     02-20  Mg     1.9     02-20    TPro  6.1  /  Alb  3.2[L]  /  TBili  1.2  /  DBili  x   /  AST  68[H]  /  ALT  25  /  AlkPhos  54  02-19    PT/INR - ( 19 Feb 2025 17:29 )   PT: 20.2 sec;   INR: 1.77 ratio         PTT - ( 19 Feb 2025 17:29 )  PTT:39.3 sec      Urinalysis Basic - ( 20 Feb 2025 21:38 )    Color: x / Appearance: x / SG: x / pH: x  Gluc: 96 mg/dL / Ketone: x  / Bili: x / Urobili: x   Blood: x / Protein: x / Nitrite: x   Leuk Esterase: x / RBC: x / WBC x   Sq Epi: x / Non Sq Epi: x / Bacteria: x        Culture - Blood (collected 19 Feb 2025 17:16)  Source: .Blood Blood-Peripheral  Gram Stain (20 Feb 2025 18:18):    Growth in aerobic bottle: Gram Negative Rods  Preliminary Report (20 Feb 2025 18:19):    Growth in aerobic bottle: Gram Negative Rods    Direct identification is available within approximately 3-5    hours either by Blood Panel Multiplexed PCR or Direct    MALDI-TOF. Details: [email or web address]  Organism: Blood Culture PCR (20 Feb 2025 23:32)  Organism: Blood Culture PCR (20 Feb 2025 23:32)    Culture - Blood (collected 19 Feb 2025 17:00)  Source: .Blood Blood-Peripheral  Preliminary Report (20 Feb 2025 20:01):    No growth at 24 hours        COORDINATION OF CARE:  Care Discussed with Consultants/Other Providers [Y/N]:  Prior or Outpatient Records Reviewed [Y/N]:

## 2025-02-21 NOTE — DISCHARGE NOTE PROVIDER - NSFOLLOWUPCLINICS_GEN_ALL_ED_FT
[facility]  Nephrology  [address] [place] [address], [address] [place] [address]  Phone: [phone]  Fax:

## 2025-02-21 NOTE — DISCHARGE NOTE PROVIDER - NSDCMRMEDTOKEN_GEN_ALL_CORE_FT
apixaban 5 mg oral tablet: 1 tab(s) orally every 12 hours  aspirin 81 mg oral tablet, chewable: 1 tab(s) orally once a day  bumetanide 1 mg oral tablet: 0.5 tab(s) orally once a day  latanoprost 0.005% ophthalmic solution: 1 gram(s) in each eye once a day (at bedtime)  losartan 25 mg oral tablet: 1 tab(s) orally once a day  metoprolol succinate 100 mg oral tablet, extended release: 2 tab(s) orally once a day  NovoLOG FlexPen 100 units/mL injectable solution: 2 unit(s) injectable once a day before lunch  nystatin 100,000 units/mL oral suspension: 5 milliliter(s) orally 4 times a day  patiromer 25.2 g oral powder for reconstitution: 25.2 gram(s) orally once a day  pravastatin 20 mg oral tablet: 1 orally once a day  predniSONE 10 mg oral tablet: 1.5 tab(s) orally once a day  Prolia 60 mg/mL subcutaneous solution: 60 milligram(s) subcutaneously every 6 months  Rollator: Rollator  rolling walker: Rolling walker, R26, Z96.6  sulfamethoxazole-trimethoprim 400 mg-80 mg oral tablet: 1 tab(s) orally 3 times a week Monday/Wednesday/Friday  tacrolimus 1 mg oral capsule: 2 cap(s) orally 2 times a day Tacrolimus 3mg twice daily  valGANciclovir 450 mg oral tablet: 1 tab(s) orally 2 times a week Monday and Thursday  Vitamin D3 25 mcg (1000 intl units) oral tablet: 1 tab(s) orally once a day

## 2025-02-21 NOTE — PROGRESS NOTE ADULT - NS ATTEND AMEND GEN_ALL_CORE FT
74/M with OHT in 2018 and LVAD explant with postop graft dysfunction, has had AMR, CKD, HTN, Nicole syndrome, A.fib on Eliquis, last admission in Jan 2024 for NORMAN and syncope, now admitted after outpt labs with hyperkalemia, NORMAN, and AMS.     pt reports he knows he is in hospital but not know year, place. denies any cp, Palpitation, syncope, fall. denies any fevers, chills, cough, URI, diarrhoea.     Imp:  AMS  Hyperkalemia  Spesis - Gram neg bactremia  OHT in 2018  A.fib  NORMAN on CKD    plan:  IV hydration  repeat blood cx  CT Abd/pelvis --> no source of infection,   please get CT Chest   cont abx as per ID recs  Txp ID follow up    last TTE feb 2025 with normal graft function  Stress TTE in 2024 normal, w/o ischemia  LHC in 2023 w/o CAV    tacro level improved,   cont tacrolimus 1mg po BID  please get levels daily prior to his AM dose for trough  cont pred    hold home antihypertensives  treat hyperkalemia  Nephrology following     cont supportive care

## 2025-02-21 NOTE — DISCHARGE NOTE PROVIDER - HOSPITAL COURSE
HPI:  73yo M pmhx HTN, HLD, nonischemic cardiomyopathy, chronic systolic heart failure s/p HM2 LVAD (6/2017), s/p heart transplant from Hep. 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 On the phone, the patient's godbrother mentioned that on 2/18, the patient was in the car with his godbrother and was disoriented asking to get off on the road 4 blocks before his house. In addition, a caretaker stated that when she spoke to Mr. Hameed on the phone at 11am on 2/18, he was doing well. However, when the caretaker visited 2 hours later at 1pm, the patient was disoriented and felt his legs were heavy. This prompted the patient to come to the hospital.     In the ED: Hypothermic 91.4, HR 80, /60, RA   Patient noted to be Hyperkalemic (6.2) with Mixed respiratory and metabolic acidosis pH 7.2. - Patient was given Calcium Gluconate 2g, D50/Insulin 5 unit, 40 mg IV lasix, Lokelma 10mg.   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    Chief Complaint: Altered mental status (AMS).    Hospital Course: Presented to the ED with AMS, preceded by two days of disorientation. In the ED, he was hypothermic (91.4°F), hyperkalemic (6.2 mEq/L), and in mixed respiratory and metabolic acidosis (pH 7.2). He received calcium gluconate, insulin/dextrose, furosemide, and patiromer. Empiric antibiotics vancomycin and zosyn were initiated. CT head and neck angiogram showed no acute findings. Blood cultures subsequently grew Pseudomonas koreensis, prompting a change to meropenam and discontinuation of vancomycin. His creatinine rakan to 2.49 mg/dL, concerning for NORMAN on CKD.   He developed bilateral upper extremity swelling and bullae; a right upper extremity duplex was negative for DVT. He remains on tacrolimus with daily levels, prednisone, and prophylactic valganciclovir. Apixaban is held due to a planned lumbar puncture. Neurology, Dermatology, Transplant ID, Transplant cardiology and Podiatry consults were obtained. An EEG was negative for seizures.    Important Medication Changes and Reason:    Active or Pending Issues Requiring Follow-up:  - Transplant Heart Failure  - Nephrology   - Transplant Infectious Disease     Advanced Directives:   [ ] Full code  [ ] DNR  [ ] Hospice    Discharge Diagnoses: GNR bacteremia and AMS

## 2025-02-21 NOTE — PROGRESS NOTE ADULT - PROBLEM SELECTOR PLAN 3
K 6.2 - shifted in the ED, no EKG changes consistent with severe hyper K. Cr not significantly elevated form baseline, c/f RTA from bactrim and Tacro. Stable   > Hold home ARB  > f/u Tacro level daily   - Hold home bactrim - ID consult regarding Atovaquone   > c/w Lokelma 10mg BID for 3 days, monitor on telemetry K 6.2 - shifted in the ED, no EKG changes consistent with severe hyper K. Cr not significantly elevated form baseline, c/f RTA from bactrim and Tacro. Stable     > Hold home ARB  > f/u Tacro level daily   > Hold home bactrim - ID consult regarding Atovaquone   > c/w Lokelma 10mg BID for 3 days, monitor on telemetry K 6.2 - shifted in the ED, no EKG changes consistent with severe hyper K. Cr not significantly elevated form baseline, c/f RTA from bactrim and Tacro. Stable   > Hold home ARB  > f/u Tacro level daily   > Hold home bactrim - ID consult regarding Atovaquone   > c/w Lokelma 10mg BID for 3 days, monitor on telemetry

## 2025-02-21 NOTE — PROGRESS NOTE ADULT - ASSESSMENT
74yo M pmhx HTN, HLD, nonischemic cardiomyopathy, chronic systolic heart failure s/p HM2 LVAD (6/2017), s/p heart transplant from Hep. 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    74 M pmhx HTN, HLD, nonischemic cardiomyopathy, chronic systolic heart failure s/p HM2 LVAD (6/2017), s/p heart transplant from Hep. 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

## 2025-02-21 NOTE — CONSULT NOTE ADULT - ASSESSMENT
Patient seen and evaluated   - toenails debrided x10 using sterile nippers  - all offending ingrowing nail boarders excised   - patient educated on proper foot care and importance of regular examinations   - recommend z flow boots at all times while in bed  - recommend betadine to left foot blister and leave open to air  - concern for PVD bilat foot, recommend vascular work up  - follow up as outpatient for routine care

## 2025-02-21 NOTE — PROGRESS NOTE ADULT - SUBJECTIVE AND OBJECTIVE BOX
[facility] Division of Kidney Diseases & Hypertension  FOLLOW UP NOTE  [phone]--------------------------------------------------------------------------------  Chief Complaint:Hyperkalemia, NORMAN    24 hour events/subjective:  Pt was seen and examined earlier today. Pt is on vEEG monitoring.   Pt reports feeling okay. But seemed a bit lethargic. ROS is limited.         PAST HISTORY  --------------------------------------------------------------------------------  No significant changes to PMH, PSH, FHx, SHx, unless otherwise noted    ALLERGIES & MEDICATIONS  --------------------------------------------------------------------------------  Allergies    No Known Allergies    Intolerances      Standing Inpatient Medications  aspirin  chewable 81 milliGRAM(s) Oral daily  atorvastatin 10 milliGRAM(s) Oral at bedtime  cholecalciferol 1000 Unit(s) Oral daily  dextrose 5%. 1000 milliLiter(s) IV Continuous <Continuous>  dextrose 5%. 1000 milliLiter(s) IV Continuous <Continuous>  dextrose 50% Injectable 25 Gram(s) IV Push once  dextrose 50% Injectable 12.5 Gram(s) IV Push once  dextrose 50% Injectable 25 Gram(s) IV Push once  glucagon  Injectable 1 milliGRAM(s) IntraMuscular once  heparin   Injectable 5000 Unit(s) SubCutaneous every 8 hours  insulin lispro (ADMELOG) corrective regimen sliding scale   SubCutaneous three times a day before meals  insulin lispro (ADMELOG) corrective regimen sliding scale   SubCutaneous at bedtime  lactated ringers. 1000 milliLiter(s) IV Continuous <Continuous>  meropenem  IVPB 1000 milliGRAM(s) IV Intermittent every 12 hours  meropenem  IVPB      metoprolol succinate  milliGRAM(s) Oral daily  nystatin    Suspension [id number] Unit(s) Oral four times a day  predniSONE   Tablet 15 milliGRAM(s) Oral daily  sodium zirconium cyclosilicate 10 Gram(s) Oral three times a day  tacrolimus 1 milliGRAM(s) Oral two times a day  valGANciclovir 450 milliGRAM(s) Oral <User Schedule>    PRN Inpatient Medications  acetaminophen     Tablet .. 650 milliGRAM(s) Oral every 6 hours PRN  dextrose Oral Gel 15 Gram(s) Oral once PRN  melatonin 3 milliGRAM(s) Oral at bedtime PRN      REVIEW OF SYSTEMS  --------------------------------------------------------------------------------  ROS is limited.     VITALS/PHYSICAL EXAM  --------------------------------------------------------------------------------  T(C): 37 (02-21-25 @ 04:46), Max: 37 (02-21-25 @ 04:46)  HR: 105 (02-21-25 @ 08:53) (56 - 105)  BP: 105/74 (02-21-25 @ 08:53) (92/67 - 150/70)  RR: 18 (02-21-25 @ 04:46) (18 - 18)  SpO2: 97% (02-21-25 @ 04:46) (92% - 97%)  Wt(kg): --  Height (cm): 182.9 (02-19-25 @ 16:40)  Weight (kg): 90.7 (02-19-25 @ 16:40)  BMI (kg/m2): 27.1 (02-19-25 @ 16:40)  BSA (m2): 2.13 (02-19-25 @ 16:40)      Physical Exam:  Gen: NAD  	Pulm: CTA B/L  	CV: S1S2  	Abd: Soft, +BS   	Ext: No LE edema B/L  	Neuro: Awake but lethargic on vEEG monitoring.   	Skin: Warm and dry      LABS/STUDIES  --------------------------------------------------------------------------------              12.7   21.64 >-----------<  195      [02-20-25 @ 05:35]              39.8     139  |  103  |  73  ----------------------------<  96      [02-20-25 @ 21:38]  5.6   |  19  |  2.49        Ca     9.1     [02-20-25 @ 21:38]      Mg     1.9     [02-20-25 @ 05:35]      Phos  4.8     [02-20-25 @ 05:35]    TPro  6.1  /  Alb  3.2  /  TBili  1.2  /  DBili  x   /  AST  68  /  ALT  25  /  AlkPhos  54  [02-19-25 @ 22:56]    PT/INR: PT 20.2 , INR 1.77       [02-19-25 @ 17:29]  PTT: 39.3       [02-19-25 @ 17:29]      Creatinine Trend:  SCr 2.49 [02-20 @ 21:38]  SCr 2.02 [02-20 @ 05:35]  SCr 2.10 [02-20 @ 05:05]  SCr 1.85 [02-19 @ 22:56]  SCr 1.35 [02-19 @ 20:07]    TSH 2.57      [02-19-25 @ 17:28]  Lipid: chol 95, TG 65, HDL 55, LDL --      [02-20-25 @ 05:35]

## 2025-02-21 NOTE — PROGRESS NOTE ADULT - PROBLEM SELECTOR PLAN 4
- s/p LVAD explant and OHT 2/23/18 with hepatitis c donor  - blood pressure medications as stated below  - continue ASA 81 mg PO QD and Pravastatin 20 mg PO QD  - holding home bactrim due to hyperkalemia (was on Bactrim S/S PO M/W/F)  - continue nystatin 4 times daily.

## 2025-02-21 NOTE — EEG REPORT - NS EEG TEXT BOX
REPORT OF CONTINUOUS VIDEO EEG    [facility]: [address] [facility] [address], [place] [address], Phone: [phone]  [facility]: [address] [place] [address], Phone: [phone]  [facility]: [address] [place] [address], Phone: [phone]    Patient Name: Yoseph Baez   Age: 74 year, : 1950  Riddle: -[facility] [address]    Study Start: 2025	17:36:34 PM      Study End:  2025	01:30  Study Duration: 7 hr  34 min    -------------------------------------------------------------------------------------------------------  EEG Recording Technique:  The patient underwent continuous Video-EEG monitoring, using Telemetry System hardware on the XLTek Digital System. EEG and video data were stored on a computer hard drive with important events saved in digital archive files. The material was reviewed by a physician (electroencephalographer / epileptologist) on a daily basis. Dominic and seizure detection algorithms were utilized and reviewed. An EEG Technician attended to the patient, and was available throughout daytime work hours.  The epilepsy center neurologist was available in person or on call 24-hours per day.    EEG Placement and Labeling of Electrodes:  The EEG was performed utilizing 20 channel referential EEG connections (coronal over temporal over parasagittal montage) using all standard 10-20 electrode placements with EKG, with additional electrodes placed in the inferior temporal region using the modified 10-10 montage electrode placements for elective admissions, or if deemed necessary. Recording was at a sampling rate of 256 samples per second per channel. Time synchronized digital video recording was done simultaneously with EEG recording. A low light infrared camera was used for low light recording.     -------------------------------------------------------------------------------------------------------  History: -  74 year old Male with AMS is here for evaluation  Medication  Tylenol      -------------------------------------------------------------------------------------------------------  Interpretation:    [[[Abbreviation Key:  PDR=alpha rhythm/posterior dominant rhythm. A-P=anterior posterior.  Amplitude: ‘very low’:<20; ‘low’:20-49; ‘medium’:; ‘high’:>150uV.  Persistence for periodic/rhythmic patterns (% of epoch) ‘rare’:<1%; ‘occasional’:1-10%; ‘frequent’:10-50%; ‘abundant’:50-90%; ‘continuous’:>90%.  Persistence for sporadic discharges: ‘rare’:<1/hr; ‘occasional’:1/min-1/hr; ‘frequent’:>1/min; ‘abundant’:>1/10 sec.  RPP=rhythmic and periodic patterns; GRDA=generalized rhythmic delta activity; FIRDA=frontal intermittent GRDA; LRDA=lateralized rhythmic delta activity; TIRDA=temporal intermittent rhythmic delta activity;  LPD=PLED=lateralized periodic discharges; GPD=generalized periodic discharges; BIPDs =bilateral independent periodic discharges; Mf=multifocal; SIRPDs=stimulus induced rhythmic, periodic, or ictal appearing discharges; BIRDs=brief potentially ictal rhythmic discharges >4 Hz, lasting .5-10s; PFA (paroxysmal bursts >13 Hz or =8 Hz <10s).  Modifiers: +F=with fast component; +S=with spike component; +R=with rhythmic component.  S-B=burst suppression pattern.  Max=maximal. N1-drowsy; N2-stage II sleep; N3-slow wave sleep. SSS/BETS=small sharp spikes/benign epileptiform transients of sleep. HV=hyperventilation; PS=photic stimulation]]]    FINDINGS:      Background:  SYMMETRY: symmetric  CONTINUOUS: continuous  PDR: absent  REACTIVITY: present  VOLTAGE: normal, [defined typically between 20-150uV]  AP GRADIENT: absent  BREACH: absent  OTHER: none    GENERALIZED SLOWING: present. Background is diffusely slow consisting of polymorphic delta theta activity up to 5 Hz    FOCAL SLOWING: none was present.    State Changes:   Drowsiness and sleep did not occur    Sporadic Epileptiform Discharges:   None    Rhythmic and Periodic Patterns (RPPs):  None     Electrographic and Electroclinical seizures:  None    Other Clinical Events:  None    Activation Procedures:   Hyperventilation was not performed.    Photic stimulation was not performed.      Artifacts:  Intermittent myogenic and movement artifacts were noted.    ECG:  The heart rate on single channel ECG was predominantly between 70-80 BPM.  -------------------------------------------------------------------------------------------------------  EEG Classification:    Abnormal EEG in lethargic state  1. Moderate diffuse background slowing    -------------------------------------------------------------------------------------------------------  EEG Impression / Clinical Correlate:    Abnormal prolonged EEG study due to Moderate diffuse cerebral dysfunction that is not specific in etiology    No epileptic discharges recorded.  No seizures recorded.      -------------------------------------------------------------------------------------------------------  KELLY Brown  Attending Physician, [facility]    ------------------------------------  EEG Reading Room: [phone]  On Call Service After Hours: [phone]            REPORT OF CONTINUOUS VIDEO EEG    [facility]: [address] [facility] [address], [place] [address], Phone: [phone]  [facility]: [address] [place] [address], Phone: [phone]  [facility]: [address] [place] [address], Phone: [phone]    Patient Name: Yoseph Baez   Age: 74 year, : 1950  Riddle: -[id number]    Study Start: 2025	17:36:34 PM      Study End:  2025	11:17. EEG is off from 01:30-08:42  Study Duration: 11 hr      -------------------------------------------------------------------------------------------------------  EEG Recording Technique:  The patient underwent continuous Video-EEG monitoring, using Telemetry System hardware on the XLTek Digital System. EEG and video data were stored on a computer hard drive with important events saved in digital archive files. The material was reviewed by a physician (electroencephalographer / epileptologist) on a daily basis. Dominic and seizure detection algorithms were utilized and reviewed. An EEG Technician attended to the patient, and was available throughout daytime work hours.  The epilepsy center neurologist was available in person or on call 24-hours per day.    EEG Placement and Labeling of Electrodes:  The EEG was performed utilizing 20 channel referential EEG connections (coronal over temporal over parasagittal montage) using all standard 10-20 electrode placements with EKG, with additional electrodes placed in the inferior temporal region using the modified 10-10 montage electrode placements for elective admissions, or if deemed necessary. Recording was at a sampling rate of 256 samples per second per channel. Time synchronized digital video recording was done simultaneously with EEG recording. A low light infrared camera was used for low light recording.     -------------------------------------------------------------------------------------------------------  History: -  74 year old Male with AMS is here for evaluation  Medication  Tylenol      -------------------------------------------------------------------------------------------------------  Interpretation:    [[[Abbreviation Key:  PDR=alpha rhythm/posterior dominant rhythm. A-P=anterior posterior.  Amplitude: ‘very low’:<20; ‘low’:20-49; ‘medium’:; ‘high’:>150uV.  Persistence for periodic/rhythmic patterns (% of epoch) ‘rare’:<1%; ‘occasional’:1-10%; ‘frequent’:10-50%; ‘abundant’:50-90%; ‘continuous’:>90%.  Persistence for sporadic discharges: ‘rare’:<1/hr; ‘occasional’:1/min-1/hr; ‘frequent’:>1/min; ‘abundant’:>1/10 sec.  RPP=rhythmic and periodic patterns; GRDA=generalized rhythmic delta activity; FIRDA=frontal intermittent GRDA; LRDA=lateralized rhythmic delta activity; TIRDA=temporal intermittent rhythmic delta activity;  LPD=PLED=lateralized periodic discharges; GPD=generalized periodic discharges; BIPDs =bilateral independent periodic discharges; Mf=multifocal; SIRPDs=stimulus induced rhythmic, periodic, or ictal appearing discharges; BIRDs=brief potentially ictal rhythmic discharges >4 Hz, lasting .5-10s; PFA (paroxysmal bursts >13 Hz or =8 Hz <10s).  Modifiers: +F=with fast component; +S=with spike component; +R=with rhythmic component.  S-B=burst suppression pattern.  Max=maximal. N1-drowsy; N2-stage II sleep; N3-slow wave sleep. SSS/BETS=small sharp spikes/benign epileptiform transients of sleep. HV=hyperventilation; PS=photic stimulation]]]    FINDINGS:      Background:  SYMMETRY: symmetric  CONTINUOUS: continuous  PDR: absent  REACTIVITY: present  VOLTAGE: normal, [defined typically between 20-150uV]  AP GRADIENT: absent  BREACH: absent  OTHER: none    GENERALIZED SLOWING: present. Background is diffusely slow consisting of polymorphic delta theta activity up to 5 Hz    FOCAL SLOWING: none was present.    State Changes:   Drowsiness and sleep did not occur    Sporadic Epileptiform Discharges:   None    Rhythmic and Periodic Patterns (RPPs):  None     Electrographic and Electroclinical seizures:  None    Other Clinical Events:  None    Activation Procedures:   Hyperventilation was not performed.    Photic stimulation was not performed.      Artifacts:  Intermittent myogenic and movement artifacts were noted.    ECG:  The heart rate on single channel ECG was predominantly between 70-80 BPM.  -------------------------------------------------------------------------------------------------------  EEG Classification:    Abnormal EEG in lethargic state  1. Moderate diffuse background slowing    -------------------------------------------------------------------------------------------------------  EEG Impression / Clinical Correlate:    Abnormal prolonged EEG study due to Moderate diffuse cerebral dysfunction that is not specific in etiology    No epileptic discharges recorded.  No seizures recorded.      -------------------------------------------------------------------------------------------------------  KELLY Brown  Attending Physician, [facility]    ------------------------------------  EEG Reading Room: [phone]  On Call Service After Hours: [phone]

## 2025-02-21 NOTE — PROGRESS NOTE ADULT - PROBLEM SELECTOR PLAN 5
- will continue to adjust tacro as needed for goal 4-6 (home dose was tacro 2 mg PO BID)  - of note he is intolerant of Cellcept due to leukopenia   - Prednisone as stated below.

## 2025-02-21 NOTE — DISCHARGE NOTE PROVIDER - CARE PROVIDER_API CALL
Sujey Lujan  Infectious Disease  [address] [place] [address]  Phone: [phone]  Fax: [phone]  Established Patient  Follow Up Time:     Marvin Campbell  Cardiovascular Disease  [address] [place] [address]  Phone: [phone]  Fax: [phone]  Established Patient  Follow Up Time: 1 week

## 2025-02-21 NOTE — PHYSICAL THERAPY INITIAL EVALUATION ADULT - NSPTDMEREC_GEN_A_CORE
Pt will require a rolling walker at home due to diagnosis of metabolic encephalopathy to complete the MRADLs within their home./rolling walker/wheelchair

## 2025-02-21 NOTE — PROGRESS NOTE ADULT - PROBLEM SELECTOR PLAN 10
- noted to have swollen right arm compared to left  - US of UE negative for DVT  - wound care consulted for blister on right arm

## 2025-02-21 NOTE — PROGRESS NOTE ADULT - SUBJECTIVE AND OBJECTIVE BOX
ADVANCED HEART FAILURE & TRANSPLANT- PROGRESS NOTE  *To reach the NS1 Team from 8am to 5pm, please call [phone].  ___________________________________________________________________________    Subjective:  - lethargic and confused    Medications:  acetaminophen     Tablet .. 650 milliGRAM(s) Oral every 6 hours PRN  aspirin  chewable 81 milliGRAM(s) Oral daily  atorvastatin 10 milliGRAM(s) Oral at bedtime  cholecalciferol 1000 Unit(s) Oral daily  dextrose 5%. 1000 milliLiter(s) IV Continuous <Continuous>  dextrose 5%. 1000 milliLiter(s) IV Continuous <Continuous>  dextrose 50% Injectable 25 Gram(s) IV Push once  dextrose 50% Injectable 12.5 Gram(s) IV Push once  dextrose 50% Injectable 25 Gram(s) IV Push once  dextrose Oral Gel 15 Gram(s) Oral once PRN  glucagon  Injectable 1 milliGRAM(s) IntraMuscular once  heparin   Injectable 5000 Unit(s) SubCutaneous every 8 hours  insulin lispro (ADMELOG) corrective regimen sliding scale   SubCutaneous three times a day before meals  insulin lispro (ADMELOG) corrective regimen sliding scale   SubCutaneous at bedtime  lactated ringers. 1000 milliLiter(s) IV Continuous <Continuous>  melatonin 3 milliGRAM(s) Oral at bedtime PRN  meropenem  IVPB 1000 milliGRAM(s) IV Intermittent every 12 hours  meropenem  IVPB      metoprolol succinate  milliGRAM(s) Oral daily  nystatin    Suspension 757347 Unit(s) Oral four times a day  predniSONE   Tablet 15 milliGRAM(s) Oral daily  sodium zirconium cyclosilicate 10 Gram(s) Oral three times a day  tacrolimus 1 milliGRAM(s) Oral two times a day  valGANciclovir 450 milliGRAM(s) Oral <User Schedule>    Vitals:  Vital Signs Last 24 Hours  T(C): 36.5 (25 @ 11:36), Max: 37 (25 @ 04:46)  HR: 100 (25 @ 11:36) (64 - 105)  BP: 92/62 (25 @ 11:36) (92/62 - 106/70)  RR: 18 (25 @ 11:36) (18 - 18)  SpO2: 97% (25 @ 11:36) (92% - 97%)    Weight in k.2 ( @ 09:55)    I&O's Summary      Physical Exam  General: resting in bed  Neck: JVP not apprecaited  Chest: Clear to auscultation bilaterally  CV: Normal S1 and S2.  Abdomen: Soft, non-distended, non-tender  Extremities: no edema in LE, noted to b/l edema of UE and have large blister on right arm   Neurology: Alert and oriented times one    Labs:                        12.5   17.85 )-----------( 176      ( 2025 09:38 )             40.2         141  |  102  |  80[H]  ----------------------------<  116[H]  4.4   |  20[L]  |  2.75[H]    Ca    9.1      2025 09:36  Phos  6.2       Mg     2.4         TPro  6.2  /  Alb  3.0[L]  /  TBili  0.8  /  DBili  x   /  AST  52[H]  /  ALT  23  /  AlkPhos  62      PT/INR - ( 2025 09:38 )   PT: 20.7 sec;   INR: 1.81 ratio         PTT - ( 2025 09:38 )  PTT:36.6 sec            Lactate, Blood: 1.1 mmol/L ( @ 01:26)  Lactate, Blood: 1.1 mmol/L ( @ 17:29)

## 2025-02-21 NOTE — PROGRESS NOTE ADULT - PROBLEM SELECTOR PLAN 7
RUE sweollen with hx of IJ thrombus, possible infiltrated IV   - UE Duplex without thrombus   > Elevate R arm, warm compresses and arm precautions RUE swollen with hx of IJ thrombus, possible infiltrated IV   - UE Duplex without thrombus     > Elevate R arm, warm compresses and arm precautions  > Consult derm regarding bilateral bullae in upper extremities RUE swollen with hx of IJ thrombus, possible infiltrated IV   - UE Duplex without thrombus     > Consult derm regarding bilateral bullae on upper extremities  > Elevate R arm, warm compresses and arm precautions RUE swollen with hx of IJ thrombus, possible infiltrated IV. LUE now swollen. + Pulses, and no motor deficits - lower concern for compartment syndrome  - RUE Duplex without thrombus   > Consult derm regarding bilateral bullae on upper extremities   > Elevate R arm, warm compresses and arm precautions

## 2025-02-21 NOTE — PROGRESS NOTE ADULT - PROBLEM SELECTOR PLAN 9
Home regimen : Eliquis 5 mg PO BID at home for hx of of afib and IJ thrombi  EKG on admission - nsr   [id number]  = 3  Last Dose : 2/20 AM   - currently holding in anticipation for LP 2/24

## 2025-02-21 NOTE — PROGRESS NOTE ADULT - PROBLEM SELECTOR PLAN 1
- was readmitted with worsening mental status, currently A&Ox1  - CT Angio head 2/19 was unremarkable how limited diagnostic study secondary to suboptimal opacification of the   intracranial arterial vasculature  - CT A/P unremarkable  - please obtain CT Chest  - ID consulted for infectious workup  - found to have 1 positive blood culture on 2/18 for gram negative rods. please repeat blood culture today   - please check CMV PCR   - remains on broad spectrum abx  - plan to check Ammonia level  - EEG unremarkable   - schedule to undergo LP on Monday 2/24.

## 2025-02-21 NOTE — PROGRESS NOTE ADULT - PROBLEM SELECTOR PLAN 7
- hx of hemolytic anemia, follows with Dr. Rosario as outpatient  - remains on Prednisone 15 mg PO QD   - would favor reconsulting heme while inhouse.

## 2025-02-21 NOTE — PROGRESS NOTE ADULT - ATTENDING COMMENTS
I have seen this patient with the fellow and agree with their assessment and plan. I was physically present for significant portions of the evaluation and management (E/M) service provided.  I agree with the above history, physical, and plan which I have reviewed and edited where appropriate.    NORMAN on CKD could be likely ATN vs pre renal and tacro level is 10.5 and got IV contrast on 19th as well  AMS and ongoing Hyperkalemia  OHT in 2018. last TTE feb 2025 with normal graft function      Could have had a siezure given elevated CPK and causing mild NORMAN  CNS workup ongoing by ID and Medicine  Check ASCENCION and BK PCR and adenovirus PCR as well  LP pending   Hydration if ok with Cards  Hold anti htn meds for now    For weekend coverage, please call Dr. Michael Mcallister ( fellow) or Dr Celestina Yepez( Attending)    Sarkis Hutchins MD  Office   Contact me directly via [employer] Teams     (After 5 pm or on weekends please page the on-call fellow/attending, can check [email or web address] for schedule. Login is andreia murrieta, schedule under [facility] medicine, psych, derm)

## 2025-02-21 NOTE — PROGRESS NOTE ADULT - ASSESSMENT
72yo M pmhx HTN, HLD, CKD, nonischemic cardiomyopathy, chronic systolic heart failure s/p HM2 LVAD (6/2017), s/p heart transplant from Hep. 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, presenting to the hospital with AMS, hypothermia, and hyperkalemia, with concern for sepsis    Patient with blood culture growing GNR identified as pseudomonas koreensis  Organisms usually found in water and soil but can be found in medical devices and food  Sensitivity pattern pending  Can continue IV Meropenem pending sensitivity results  Please send repeat blood cultures to look for bacteremia clearance  follow up UA and culture  Abdomen soft on exam but has gall stones 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, presenting to the hospital with AMS, hypothermia, and hyperkalemia, with concern for sepsis    Patient with blood culture growing GNR identified as pseudomonas koreensis  Organisms usually found in water and soil but can be found in medical devices and food  Sensitivity pattern pending  Can continue IV Meropenem pending sensitivity results  Please send repeat blood cultures to look for bacteremia clearance  follow up UA and culture  Abdomen soft on exam but has gall stones 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

## 2025-02-21 NOTE — PROGRESS NOTE ADULT - PROBLEM SELECTOR PLAN 1
Patient baseline AOx3, AOx1 currently, iso of infection vs metabolic derangements (uremic encephelopathy)   -  CTH and angio w/o hemorrhage or stenosis   - TSH wnl / B12, CK 2200  - Fall precautions and dysphagia screen  > Spot EEG to r/o seizure due to elevated CK and hx of seizures  > Neurology recs appreciated   > Infectious workup Patient baseline AOx3, AOx1 currently, iso of infection vs metabolic derangements (uremic encephelopathy)   -  CTH and angio w/o hemorrhage or stenosis   - TSH wnl / B12, CK 2200  - Fall precautions and dysphagia screen  - EEG: abnormal prolonged study due to moderate diffuse cerebral dysfunction that is not specific in etiology. No epileptic discharges recorded. No seizures recorded.    > Neurology recs appreciated   > Pending Infectious workup Patient baseline AOx3, AOx1 currently, iso of infection vs metabolic derangements (uremic encephelopathy)   - CTH and angio w/o hemorrhage or stenosis   - TSH wnl / B12, CK 2200  - Fall precautions and dysphagia screen  - spot EEG: negative for seizures   > Neurology recs appreciated   > Pending Infectious workup and posssible LP 2/24

## 2025-02-21 NOTE — DISCHARGE NOTE PROVIDER - PROVIDER TOKENS
PROVIDER:[TOKEN:[[id number]],ESTABLISHEDPATIENT:[T]],PROVIDER:[TOKEN:[[id number]],FOLLOWUP:[1 week],ESTABLISHEDPATIENT:[T]]

## 2025-02-21 NOTE — CONSULT NOTE ADULT - ASSESSMENT
___________________________  #Favor edema bullae  - Morphology and history consistent with above   - The patient may develop many new bullae/vesicles in edematous locations over the next several days.  - These tend to be painful and may leave behind erosions when they rupture. It is best to leave them intact and not to peel off the blister roofs if they rupture. Addressing the underlying fluid status will lead to decreased development of these bullae.   - If the bullae rupture, I would recommend placement of vaseline, covered by an Adaptic bandage. Cotton gauze may not be feasible depending on how many lesions develop. If there are large involved areas, cotton gauze wraps may be helpful to hold adaptic in place.     The patient's chart was reviewed in addition to being seen and examined at bedside with the dermatology attending Dr. Diop .  Recommendations were communicated with the primary team.  Please page [phone] with a [phone] call-back number for further related questions.     Thank you for allowing us to participate in the care of this patient.  Please alert Dermatology for any new or worsening developments.    Angelito Zamora MD  Resident Physician, PGY-2  [facility] Dermatology  Pager: [phone]  Office: [phone]

## 2025-02-21 NOTE — PHYSICAL THERAPY INITIAL EVALUATION ADULT - TRANSFER SAFETY CONCERNS NOTED: SIT/STAND, REHAB EVAL
pt refuses to utilize rolling walker or single point cane at bedside/decreased safety awareness/losing balance/decreased sequencing ability/decreased weight-shifting ability

## 2025-02-21 NOTE — PROGRESS NOTE ADULT - SUBJECTIVE AND OBJECTIVE BOX
INFECTIOUS DISEASES FOLLOW UP-- Sujey Lujan  [phone]    This is a follow up note for this  74yMale with  Hyperkalemia, renal dysfunction, GNR sepsis  remains delirious and at times combative        ROS:  CONSTITUTIONAL:  hypothermic on admisison  CARDIOVASCULAR:  No chest pain or palpitations  RESPIRATORY:  No dyspnea  GASTROINTESTINAL:  No nausea, vomiting, diarrhea, or abdominal pain  GENITOURINARY:  No dysuria  NEUROLOGIC:  No headache,     Allergies    No Known Allergies    Intolerances        ANTIBIOTICS/RELEVANT:  antimicrobials  meropenem  IVPB 1000 milliGRAM(s) IV Intermittent every 12 hours  meropenem  IVPB      nystatin    Suspension 210972 Unit(s) Oral four times a day  valGANciclovir 450 milliGRAM(s) Oral <User Schedule>    immunologic:  tacrolimus 1 milliGRAM(s) Oral two times a day    OTHER:  acetaminophen     Tablet .. 650 milliGRAM(s) Oral every 6 hours PRN  aspirin  chewable 81 milliGRAM(s) Oral daily  atorvastatin 10 milliGRAM(s) Oral at bedtime  cholecalciferol 1000 Unit(s) Oral daily  dextrose 5%. 1000 milliLiter(s) IV Continuous <Continuous>  dextrose 5%. 1000 milliLiter(s) IV Continuous <Continuous>  dextrose 50% Injectable 25 Gram(s) IV Push once  dextrose 50% Injectable 12.5 Gram(s) IV Push once  dextrose 50% Injectable 25 Gram(s) IV Push once  dextrose Oral Gel 15 Gram(s) Oral once PRN  glucagon  Injectable 1 milliGRAM(s) IntraMuscular once  heparin   Injectable 5000 Unit(s) SubCutaneous every 8 hours  insulin lispro (ADMELOG) corrective regimen sliding scale   SubCutaneous three times a day before meals  insulin lispro (ADMELOG) corrective regimen sliding scale   SubCutaneous at bedtime  lactated ringers. 1000 milliLiter(s) IV Continuous <Continuous>  melatonin 3 milliGRAM(s) Oral at bedtime PRN  metoprolol succinate  milliGRAM(s) Oral daily  predniSONE   Tablet 15 milliGRAM(s) Oral daily  sodium zirconium cyclosilicate 10 Gram(s) Oral three times a day      Objective:  Vital Signs Last 24 Hrs  T(C): 36.5 (21 Feb 2025 11:36), Max: 37 (21 Feb 2025 04:46)  T(F): 97.7 (21 Feb 2025 11:36), Max: 98.6 (21 Feb 2025 04:46)  HR: 100 (21 Feb 2025 11:36) (64 - 105)  BP: 92/62 (21 Feb 2025 11:36) (92/62 - 106/70)  BP(mean): 85 (21 Feb 2025 08:53) (85 - 85)  RR: 18 (21 Feb 2025 11:36) (18 - 18)  SpO2: 97% (21 Feb 2025 11:36) (92% - 97%)    Parameters below as of 21 Feb 2025 11:36  Patient On (Oxygen Delivery Method): room air        PHYSICAL EXAM:  Constitutional:no acute distress  Eyes:WALT, EOMI  Ear/Nose/Throat: no oral lesions, 	  Respiratory: clear BL  Cardiovascular: S1S2  Gastrointestinal:soft, (+) BS, no tenderness  Extremities:no e/e/c  No Lymphadenopathy  IV sites not inflammed.    LABS:                        12.5   17.85 )-----------( 176      ( 21 Feb 2025 09:38 )             40.2     02-21    141  |  102  |  80[H]  ----------------------------<  116[H]  4.4   |  20[L]  |  2.75[H]    Ca    9.1      21 Feb 2025 09:36  Phos  6.2     02-21  Mg     2.4     02-21    TPro  6.2  /  Alb  3.0[L]  /  TBili  0.8  /  DBili  x   /  AST  52[H]  /  ALT  23  /  AlkPhos  62  02-21    PT/INR - ( 21 Feb 2025 09:38 )   PT: 20.7 sec;   INR: 1.81 ratio         PTT - ( 21 Feb 2025 09:38 )  PTT:36.6 sec  Urinalysis Basic - ( 21 Feb 2025 09:36 )    Color: x / Appearance: x / SG: x / pH: x  Gluc: 116 mg/dL / Ketone: x  / Bili: x / Urobili: x   Blood: x / Protein: x / Nitrite: x   Leuk Esterase: x / RBC: x / WBC x   Sq Epi: x / Non Sq Epi: x / Bacteria: x        MICROBIOLOGY:            RECENT CULTURES:  02-19 @ 17:16  .Blood Blood-Peripheral  Blood Culture PCR  Blood Culture PCR  PCR    Growth in aerobic bottle: Pseudomonas koreensis  Direct identification is available within approximately 3-5  hours either by Blood Panel Multiplexed PCR or Direct  MALDI-TOF. Details: [email or web address]  --  02-19 @ 17:00  .Blood Blood-Peripheral  --  --  --    No growth at 24 hours  --      RADIOLOGY & ADDITIONAL STUDIES:    < from: US Abdomen Upper Quadrant Right (02.21.25 @ 16:32) >    FINDINGS:  Limited due to patient's condition, overlying bowel, and overlying   bandages.    Liver: Visualized portions appear normal in echogenicity and echotexture.  Bile ducts: Not visualized  Gallbladder: Partially visualized porcelain gallbladder with   cholelithiasis, unchanged from 2/20/2025 CT.  Pancreas: Poorly visualized.  Right kidney: 9.7 cm. No hydronephrosis. Multiple cysts, the largest of   which measures 1.3 cm in diameter seen along the interpolar region.  Ascites: None.  IVC: Visualized portions are within normal limits.    IMPRESSION:  *  Limited exam due to patient's condition, overlying bowel, and bandages.  *  No acute sonographic findings in the Right Upper Quadrant.  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  *  Renal cysts as above.    < end of copied text >  < from: CT Abdomen and Pelvis w/ IV Cont (02.20.25 @ 10:16) >  FINDINGS:  LOWER CHEST: Unchanged right lower lobe atelectasis. Status post median   sternotomy. Status post epicardial leads. Cardiomegaly.    LIVER: Within normal limits.  BILE DUCTS: Normal caliber.  GALLBLADDER: Cholelithiasis. Porcelain gallbladder.  SPLEEN: Within normal limits.  PANCREAS: Within normal limits.  ADRENALS: Unchanged nodular thickening of left adrenal gland.  KIDNEYS/URETERS: Bilateral renal cysts.    BLADDER: Rosas catheter.  REPRODUCTIVE ORGANS: Limited evaluation secondary to streak artifact from   left hip arthroplasty.    BOWEL: No bowel obstruction. Appendix is not visualized.  PERITONEUM/RETROPERITONEUM: No pneumoperitoneum. No focal collection.  VESSELS: Atherosclerotic changes.  LYMPH NODES: No lymphadenopathy.  ABDOMINAL WALL: Tiny fat-containing hernia. No significant change in   fatty atrophy of right tensor fascia rubi.  BONES: Dextroscoliosis. Degenerative changes of the spine, especially at   L2-L3, without significant change.    IMPRESSION:  No focal collection.  No acute findings in the abdomen or pelvis.    < end of copied text >   INFECTIOUS DISEASES FOLLOW UP-- Sujey Lujan  [phone]    This is a follow up note for this  74yMale with  Hyperkalemia, renal dysfunction, GNR sepsis  remains delirious and at times combative earlier in the day but more awake and appropriate at night        ROS:  CONSTITUTIONAL:  hypothermic on admisison  CARDIOVASCULAR:  No chest pain or palpitations  RESPIRATORY:  No dyspnea  GASTROINTESTINAL:  No nausea, vomiting, diarrhea, or abdominal pain  GENITOURINARY:  No dysuria  NEUROLOGIC:  No headache,     Allergies    No Known Allergies    Intolerances        ANTIBIOTICS/RELEVANT:  antimicrobials  meropenem  IVPB 1000 milliGRAM(s) IV Intermittent every 12 hours  meropenem  IVPB      nystatin    Suspension 599956 Unit(s) Oral four times a day  valGANciclovir 450 milliGRAM(s) Oral <User Schedule>    immunologic:  tacrolimus 1 milliGRAM(s) Oral two times a day    OTHER:  acetaminophen     Tablet .. 650 milliGRAM(s) Oral every 6 hours PRN  aspirin  chewable 81 milliGRAM(s) Oral daily  atorvastatin 10 milliGRAM(s) Oral at bedtime  cholecalciferol 1000 Unit(s) Oral daily  dextrose 5%. 1000 milliLiter(s) IV Continuous <Continuous>  dextrose 5%. 1000 milliLiter(s) IV Continuous <Continuous>  dextrose 50% Injectable 25 Gram(s) IV Push once  dextrose 50% Injectable 12.5 Gram(s) IV Push once  dextrose 50% Injectable 25 Gram(s) IV Push once  dextrose Oral Gel 15 Gram(s) Oral once PRN  glucagon  Injectable 1 milliGRAM(s) IntraMuscular once  heparin   Injectable 5000 Unit(s) SubCutaneous every 8 hours  insulin lispro (ADMELOG) corrective regimen sliding scale   SubCutaneous three times a day before meals  insulin lispro (ADMELOG) corrective regimen sliding scale   SubCutaneous at bedtime  lactated ringers. 1000 milliLiter(s) IV Continuous <Continuous>  melatonin 3 milliGRAM(s) Oral at bedtime PRN  metoprolol succinate  milliGRAM(s) Oral daily  predniSONE   Tablet 15 milliGRAM(s) Oral daily  sodium zirconium cyclosilicate 10 Gram(s) Oral three times a day      Objective:  Vital Signs Last 24 Hrs  T(C): 36.5 (21 Feb 2025 11:36), Max: 37 (21 Feb 2025 04:46)  T(F): 97.7 (21 Feb 2025 11:36), Max: 98.6 (21 Feb 2025 04:46)  HR: 100 (21 Feb 2025 11:36) (64 - 105)  BP: 92/62 (21 Feb 2025 11:36) (92/62 - 106/70)  BP(mean): 85 (21 Feb 2025 08:53) (85 - 85)  RR: 18 (21 Feb 2025 11:36) (18 - 18)  SpO2: 97% (21 Feb 2025 11:36) (92% - 97%)    Parameters below as of 21 Feb 2025 11:36  Patient On (Oxygen Delivery Method): room air        PHYSICAL EXAM:  Constitutional:no acute distress  Eyes:WALT, EOMI  Ear/Nose/Throat: no oral lesions, 	  Respiratory: clear BL  Cardiovascular: S1S2  Gastrointestinal:soft, (+) BS, no tenderness  Extremities: upper extremities with bullae, some of which have sloughed  No Lymphadenopathy  IV sites not inflammed.    LABS:                        12.5   17.85 )-----------( 176      ( 21 Feb 2025 09:38 )             40.2     02-21    141  |  102  |  80[H]  ----------------------------<  116[H]  4.4   |  20[L]  |  2.75[H]    Ca    9.1      21 Feb 2025 09:36  Phos  6.2     02-21  Mg     2.4     02-21    TPro  6.2  /  Alb  3.0[L]  /  TBili  0.8  /  DBili  x   /  AST  52[H]  /  ALT  23  /  AlkPhos  62  02-21    PT/INR - ( 21 Feb 2025 09:38 )   PT: 20.7 sec;   INR: 1.81 ratio         PTT - ( 21 Feb 2025 09:38 )  PTT:36.6 sec  Urinalysis Basic - ( 21 Feb 2025 09:36 )    Color: x / Appearance: x / SG: x / pH: x  Gluc: 116 mg/dL / Ketone: x  / Bili: x / Urobili: x   Blood: x / Protein: x / Nitrite: x   Leuk Esterase: x / RBC: x / WBC x   Sq Epi: x / Non Sq Epi: x / Bacteria: x        MICROBIOLOGY:            RECENT CULTURES:  02-19 @ 17:16  .Blood Blood-Peripheral  Blood Culture PCR  Blood Culture PCR  PCR    Growth in aerobic bottle: Pseudomonas koreensis  Direct identification is available within approximately 3-5  hours either by Blood Panel Multiplexed PCR or Direct  MALDI-TOF. Details: [email or web address]  --  02-19 @ 17:00  .Blood Blood-Peripheral  --  --  --    No growth at 24 hours  --      RADIOLOGY & ADDITIONAL STUDIES:    < from: US Abdomen Upper Quadrant Right (02.21.25 @ 16:32) >    FINDINGS:  Limited due to patient's condition, overlying bowel, and overlying   bandages.    Liver: Visualized portions appear normal in echogenicity and echotexture.  Bile ducts: Not visualized  Gallbladder: Partially visualized porcelain gallbladder with   cholelithiasis, unchanged from 2/20/2025 CT.  Pancreas: Poorly visualized.  Right kidney: 9.7 cm. No hydronephrosis. Multiple cysts, the largest of   which measures 1.3 cm in diameter seen along the interpolar region.  Ascites: None.  IVC: Visualized portions are within normal limits.    IMPRESSION:  *  Limited exam due to patient's condition, overlying bowel, and bandages.  *  No acute sonographic findings in the Right Upper Quadrant.  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  *  Renal cysts as above.    < end of copied text >  < from: CT Abdomen and Pelvis w/ IV Cont (02.20.25 @ 10:16) >  FINDINGS:  LOWER CHEST: Unchanged right lower lobe atelectasis. Status post median   sternotomy. Status post epicardial leads. Cardiomegaly.    LIVER: Within normal limits.  BILE DUCTS: Normal caliber.  GALLBLADDER: Cholelithiasis. Porcelain gallbladder.  SPLEEN: Within normal limits.  PANCREAS: Within normal limits.  ADRENALS: Unchanged nodular thickening of left adrenal gland.  KIDNEYS/URETERS: Bilateral renal cysts.    BLADDER: Rosas catheter.  REPRODUCTIVE ORGANS: Limited evaluation secondary to streak artifact from   left hip arthroplasty.    BOWEL: No bowel obstruction. Appendix is not visualized.  PERITONEUM/RETROPERITONEUM: No pneumoperitoneum. No focal collection.  VESSELS: Atherosclerotic changes.  LYMPH NODES: No lymphadenopathy.  ABDOMINAL WALL: Tiny fat-containing hernia. No significant change in   fatty atrophy of right tensor fascia rubi.  BONES: Dextroscoliosis. Degenerative changes of the spine, especially at   L2-L3, without significant change.    IMPRESSION:  No focal collection.  No acute findings in the abdomen or pelvis.    < end of copied text >

## 2025-02-22 NOTE — PROGRESS NOTE ADULT - PROBLEM SELECTOR PLAN 8
A1c 5.8  - ISS RUE swollen with hx of IJ thrombus, possible infiltrated IV. LUE now swollen. + Pulses, and no motor deficits - lower concern for compartment syndrome  - RUE Duplex without thrombus   > Consult derm regarding bilateral bullae on upper extremities   > Elevate R arm, warm compresses and arm precautions

## 2025-02-22 NOTE — PROGRESS NOTE ADULT - PROBLEM SELECTOR PLAN 5
hx of hemolytic anemia, follows with Dr. Rosario as outpatient  - remains on Prednisone 15 mg PO QD   > h/o Consult  > Monitor H&H daily Known hx of CKD 3, Cr 2.4 uptrending  -  Scr ranged from 1.3-2.1 in 2023. Most recent Scr was 1.75 on 1/30/24. On admission, Scr was 1.9 and is worsening now at 2.49 today. UA showed trace ketones.     > Hold home ARB for now   > Euvolemic - will hold home Bumex 0.5mg  > No improvement with IVF, likely ATN i/s/o contrast/medications.   >started renvela for hyperphosphatemia

## 2025-02-22 NOTE — PROGRESS NOTE ADULT - PROBLEM SELECTOR PLAN 3
K 6.2 - shifted in the ED, no EKG changes consistent with severe hyper K. Cr not significantly elevated form baseline, c/f RTA from bactrim and Tacro. Stable   > Hold home ARB  > f/u Tacro level daily   > Hold home bactrim - ID consult regarding Atovaquone   > c/w Lokelma 10mg BID for 3 days, monitor on telemetry 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    > Bcx 2/20 w/ Pseudomonas koreensis, Ucx NGTD, repeat cultures 2/22 pending   > c/w Meropenam (2/21- ), Hold off additional doses of Vancomycin   > c/w home Valganciclovir for ppx renally dosed   > f/u infectious labs - Toxoplasma, EBV, Fungitell, Galactomannan, CMV, Cryptococcus, MRSA, ASCENCION virus, CMV, Crypto, ASCENCION virus  > CTC pending   > Transplant ID recommendations   > LP pending Monday 2/24

## 2025-02-22 NOTE — PROGRESS NOTE ADULT - PROBLEM SELECTOR PLAN 1
Patient baseline AOx3, AOx1 currently, iso of infection vs metabolic derangements (uremic encephelopathy)   - CTH and angio w/o hemorrhage or stenosis   - TSH wnl / B12, CK 2200  - Fall precautions and dysphagia screen  - spot EEG: negative for seizures   > Neurology recs appreciated   > Pending Infectious workup and posssible LP 2/24 1 episode of coffee ground emesis overnight  unclear if true GIB  Hb stable   Holding DVT ppx and ASA for now    >monitor for further episodes  >c/w PPI IV 40 BID   >if repeat episode, GI consult   >CT A/P noncontrast ordered given distended abdomen.

## 2025-02-22 NOTE — PROGRESS NOTE ADULT - PROBLEM SELECTOR PLAN 4
Known hx of CKD 3, Cr 2.4 uptrending  -  Scr ranged from 1.3-2.1 in 2023. Most recent Scr was 1.75 on 1/30/24. On admission, Scr was 1.9 and is worsening now at 2.49 today. UA showed trace ketones.     > Hold home ARB for now   > Euvolemic - will hold home Bumex 0.5mg  > Concern for pre-renal etiology due to decrease PO intake - Maintenance IVF 100cc/h K 6.2 - shifted in the ED, no EKG changes consistent with severe hyper K. Cr not significantly elevated form baseline, c/f RTA from bactrim and Tacro. Stable   > Hold home ARB  > f/u Tacro level daily   > Hold home bactrim - ID consult regarding Atovaquone   > c/w Lokelma 10mg BID for 3 days, monitor on telemetry

## 2025-02-22 NOTE — PROGRESS NOTE ADULT - SUBJECTIVE AND OBJECTIVE BOX
[facility] Division of Kidney Diseases & Hypertension  FOLLOW UP NOTE  [phone]--------------------------------------------------------------------------------  Chief Complaint:Hyperkalemia, NORMAN on ?CKD    24 hour events/subjective:  Pt was more lethargic and is not responding to commands. Responding to noxious stimuli.   ROS is limited.     PAST HISTORY  --------------------------------------------------------------------------------  No significant changes to PMH, PSH, FHx, SHx, unless otherwise noted    ALLERGIES & MEDICATIONS  --------------------------------------------------------------------------------  Allergies    No Known Allergies    Intolerances      Standing Inpatient Medications  atorvastatin 10 milliGRAM(s) Oral at bedtime  chlorhexidine 2% Cloths 1 Application(s) Topical daily  cholecalciferol 1000 Unit(s) Oral daily  dextrose 5%. 1000 milliLiter(s) IV Continuous <Continuous>  dextrose 5%. 1000 milliLiter(s) IV Continuous <Continuous>  dextrose 50% Injectable 25 Gram(s) IV Push once  dextrose 50% Injectable 12.5 Gram(s) IV Push once  dextrose 50% Injectable 25 Gram(s) IV Push once  glucagon  Injectable 1 milliGRAM(s) IntraMuscular once  insulin lispro (ADMELOG) corrective regimen sliding scale   SubCutaneous three times a day before meals  insulin lispro (ADMELOG) corrective regimen sliding scale   SubCutaneous at bedtime  meropenem  IVPB 1000 milliGRAM(s) IV Intermittent every 12 hours  meropenem  IVPB      metoprolol succinate  milliGRAM(s) Oral daily  nystatin    Suspension 920166 Unit(s) Oral four times a day  pantoprazole  Injectable 40 milliGRAM(s) IV Push two times a day  polyethylene glycol 3350 17 Gram(s) Oral daily  predniSONE   Tablet 15 milliGRAM(s) Oral daily  senna 2 Tablet(s) Oral daily  sevelamer carbonate 800 milliGRAM(s) Oral every 8 hours  tacrolimus 1 milliGRAM(s) Oral two times a day  valGANciclovir 450 milliGRAM(s) Oral every 48 hours    PRN Inpatient Medications  acetaminophen     Tablet .. 650 milliGRAM(s) Oral every 6 hours PRN  dextrose Oral Gel 15 Gram(s) Oral once PRN  melatonin 3 milliGRAM(s) Oral at bedtime PRN      REVIEW OF SYSTEMS  --------------------------------------------------------------------------------  ROS is limited.     VITALS/PHYSICAL EXAM  --------------------------------------------------------------------------------  T(C): 35.4 (02-22-25 @ 13:31), Max: 36.3 (02-21-25 @ 20:55)  HR: 85 (02-22-25 @ 10:58) (85 - 98)  BP: 95/58 (02-22-25 @ 10:58) (95/58 - 123/72)  RR: 18 (02-22-25 @ 10:58) (18 - 18)  SpO2: 95% (02-22-25 @ 10:58) (95% - 100%)  Wt(kg): --        02-21-25 @ 07:01  -  02-22-25 @ 07:00  --------------------------------------------------------  IN: 650 mL / OUT: 201 mL / NET: 449 mL    02-22-25 @ 07:01  -  02-22-25 @ 13:54  --------------------------------------------------------  IN: 0 mL / OUT: 25 mL / NET: -25 mL      Physical Exam:  Gen: NAD  Pulm: CTA B/L  CV: S1S2  Abd: Soft, +BS   Ext: No LE edema B/L  Neuro: AAO x0  Skin: Warm and dry    LABS/STUDIES  --------------------------------------------------------------------------------              12.1   15.17 >-----------<  213      [02-22-25 @ 07:33]              38.8     139  |  101  |  98  ----------------------------<  120      [02-22-25 @ 07:32]  4.8   |  20  |  4.65        Ca     9.3     [02-22-25 @ 07:32]      Mg     2.7     [02-22-25 @ 07:32]      Phos  8.6     [02-22-25 @ 07:32]    TPro  6.3  /  Alb  2.6  /  TBili  0.6  /  DBili  x   /  AST  60  /  ALT  24  /  AlkPhos  66  [02-22-25 @ 07:32]    PT/INR: PT 15.9 , INR 1.39       [02-22-25 @ 04:17]  PTT: 35.6       [02-22-25 @ 04:17]      Creatinine Trend:  SCr 4.65 [02-22 @ 07:32]  SCr 4.34 [02-22 @ 03:47]  SCr 2.75 [02-21 @ 09:36]  SCr 2.49 [02-20 @ 21:38]  SCr 2.02 [02-20 @ 05:35]      TSH 2.57      [02-19-25 @ 17:28]  Lipid: chol 95, TG 65, HDL 55, LDL --      [02-20-25 @ 05:35]      Syphilis Screen (Treponema Pallidum Ab) Negative      [02-21-25 @ 09:38]

## 2025-02-22 NOTE — PROGRESS NOTE ADULT - PROBLEM SELECTOR PLAN 9
Home regimen : Eliquis 5 mg PO BID at home for hx of of afib and IJ thrombi  EKG on admission - nsr   [id number]  = 3  Last Dose : 2/20 AM   - currently holding in anticipation for LP 2/24 A1c 5.8  - ISS

## 2025-02-22 NOTE — PROGRESS NOTE ADULT - PROBLEM SELECTOR PLAN 11
Extremities appear cool and dry   - Local wound care by podiatry   > Podiatry recommendations - pending SHANEKA studies, consider vascular evaluation if abnormal continue home Toprol  mg PO QD  - holding home Losartan 75 mg PO QD due to hyperkalemia

## 2025-02-22 NOTE — PROGRESS NOTE ADULT - ASSESSMENT
74yo M pmhx HTN, HLD, nonischemic cardiomyopathy, chronic systolic heart failure s/p HM2 LVAD (6/2017), s/p heart transplant from Hep. 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 Pt here with change in MS, hyperkalemia and norman.   Patient was given Calcium Gluconate 2g, D50/Insulin 5 unit, 40 mg IV lasix, Lokelma 10mg.   Vancomycin and Zosyn       Hyperkalemia:  Pt with K level of 6.7 that improved to 6.1 after medical management with additional dose of insulin given.   K 5.6 initially - On Lokelma 10 TID.   Serum k is better.   Monitor labs.     Stage 3 chronic kidney disease: with superimposed NORMAN.   Patient with known history of CKD. Upon HIE review, Scr ranged from 1.3-2.1 in 2023. Most recent Scr was 1.75 on 1/30/24.   Scr is worsening now.  2.49 --->2.75--> 4.34--> 4.65  CPK is down trending. - Pt denies any seizure before coming to hospital. (Though not reliable history)  NORMAN likely in setting of ? underlying infection  Transplant ID reccs appreciated.   Pt has respiratory acidosis as well along with metabolic acidosis.   neurology is on board.     PLAN:  Phos binders work with PO diet only.   Resp acidosis management as per primary team.   Pt has urine ketones - check BHB.   Monitor labs, daily wts and I/Os.   Check tacro trough daily.   Check urine lytes.  on empirical Abx. Infectious work up as per primary team.   Dose medications as per eGFR for now.       Called pt's care taker - Tahira Quintero - explained his condition from nephrology stand point. She verbalized her understanding and consented for hemodialysis/ CRRT, if needed.

## 2025-02-22 NOTE — PROGRESS NOTE ADULT - PROBLEM SELECTOR PLAN 6
Nonischemic cardiomyopathy, chronic systolic heart failure s/p HM2 LVAD (6/2017), s/p heart transplant from Hep. C donor (treated) 2/23/18   - Home Tacrolimus dose 2mg BID, Pred 15mg qd    > Continue with home Pred 15mg qd  > Hold home Bactrim given hyperkalemia - can consider Atovaquone  > c/w home ASA, Atorvastatin (therapeutic interchange)   > c/w home Metoprolol from 200mg with hold parameters   > Tacro level daily : home regimen 2mg BID, c/w 1mg BID  Note:  intolerant of Cellcept due to leukopenia hx of hemolytic anemia, follows with Dr. Rosario as outpatient  - remains on Prednisone 15 mg PO QD   > h/o Consult  > Monitor H&H daily

## 2025-02-22 NOTE — PROGRESS NOTE ADULT - PROBLEM SELECTOR PLAN 7
RUE swollen with hx of IJ thrombus, possible infiltrated IV. LUE now swollen. + Pulses, and no motor deficits - lower concern for compartment syndrome  - RUE Duplex without thrombus   > Consult derm regarding bilateral bullae on upper extremities   > Elevate R arm, warm compresses and arm precautions Nonischemic cardiomyopathy, chronic systolic heart failure s/p HM2 LVAD (6/2017), s/p heart transplant from Hep. C donor (treated) 2/23/18   - Home Tacrolimus dose 2mg BID, Pred 15mg qd    > Continue with home Pred 15mg qd  > Hold home Bactrim given hyperkalemia - can consider Atovaquone  > c/w home ASA, Atorvastatin (therapeutic interchange)   > c/w home Metoprolol from 200mg with hold parameters   > Tacro level daily : home regimen 2mg BID, c/w 1mg BID  Note:  intolerant of Cellcept due to leukopenia

## 2025-02-22 NOTE — PROGRESS NOTE ADULT - ATTENDING COMMENTS
Pt. is a heart transplant recipient, with CKD, now with NORMAN in the setting of ?infection and GI bleed (noted to have episode of coffee ground emesis this am), and use of CNI. Scr uptrending, with stable electrolyte levels and stable volume status.   Check tacrolimus trough levels.  No plan for HD at this time however may need to consider HD if renal function worsens.   Monitor BMP. Strict I/Os. Avoid nephrotoxins.

## 2025-02-22 NOTE — RAPID RESPONSE TEAM SUMMARY - NSSITUATIONBACKGROUNDRRT_GEN_ALL_CORE
74 M pmhx HTN, HLD, nonischemic cardiomyopathy, chronic systolic heart failure s/p HM2 LVAD (6/2017), s/p heart transplant from Hep. 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     RRT called for coffee ground emesis. On arrival to RRT, pt is not in distress. He is AO x~1. Responds to pain. HR wnl, BP stable. FS wnl. Afebrile. Exam with abdominal distention, possibly more tender in RUQ. IV access obtained. Xray abdomen also obtained showing dilated loops of bowel. EKG with QTC 490s. Pt given 80mg pantoprazole and started on 40mg IV BID. Unable to review prior CT A/P. Repeat labs + blood culture obtained. Pt made NPO.    - Primary team to follow up labs and review imaging with radiology. Pt may need CT A/P to rule out obstruction. 74 M pmhx HTN, HLD, nonischemic cardiomyopathy, chronic systolic heart failure s/p HM2 LVAD (6/2017), s/p heart transplant from Hep. 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     RRT called for coffee ground emesis. On arrival to RRT, pt is not in distress. He is AO x~1. Responds to pain. HR wnl, BP stable. FS wnl. Afebrile. Exam with abdominal distention, possibly more tender in RUQ. + BS x4. IV access obtained. Xray abdomen also obtained showing dilated loops of bowel. EKG with QTC 490s. Pt given 80mg pantoprazole and started on 40mg IV BID. Unable to review prior CT A/P. Repeat labs + blood culture obtained. Pt made NPO.    - Primary team to follow up labs.  - Imaging reviewed with radiology. Suspect ileus rather than obstruction at this time.

## 2025-02-22 NOTE — PROGRESS NOTE ADULT - ATTENDING COMMENTS
72yo M pmhx HTN, HLD, nonischemic cardiomyopathy, chronic systolic heart failure s/p HM2 LVAD (6/2017), s/p heart transplant from Hep. 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    Metabolic encephalopathy  -BCx with GNR   -Discussed with transplant ID- on meropenem  -LP when safe off Eliquis- Monday  -CT Abd/Pelvis Negative  -Head CT negative    1 episode of emesis overnight- ? coffee ground  unclear if true GIB  Hb stable   CT A/P noncontrast ordered given distended abdomen.    Cardiac Transplant  -Adjustment of cardiac transplant meds as per transplant team.    rest of management as documented above

## 2025-02-22 NOTE — PROGRESS NOTE ADULT - PROBLEM SELECTOR PLAN 2
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    > Bcx 2/20 w/ Pseudomonas koreensis, Ucx NGTD, repeat cultures 2/22 pending   > c/w Meropenam (2/21- ), Hold off additional doses of Vancomycin   > c/w home Valganciclovir for ppx renally dosed   > f/u infectious labs - Toxoplasma, EBV, Fungitell, Galactomannan, CMV, Cryptococcus, MRSA, ASCENCION virus, CMV, Crypto, ASCENCION virus  > CTC pending   > Transplant ID recommendations   > LP pending Monday 2/24 Patient baseline AOx3, AOx1 currently, iso of infection vs metabolic derangements (uremic encephelopathy)   - CTH and angio w/o hemorrhage or stenosis   - TSH wnl / B12, CK 2200  - Fall precautions and dysphagia screen  - spot EEG: negative for seizures   > Neurology recs appreciated   > Pending Infectious workup and posssible LP 2/24

## 2025-02-22 NOTE — CONSULT NOTE ADULT - SUBJECTIVE AND OBJECTIVE BOX
Mr. Baez is a 74 year old man with PMHx HTN, HLD, nonischemic cardiomyopathy, chronic systolic heart failure s/p HM2 LVAD (6/2017), s/p heart transplant from Hep. 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 Hospital course significant for worsening renal function, pseudomonas koreensis bacteremia,       PAST MEDICAL HISTORY: CHF (Congestive Heart Failure)    HTN    SVT (Supraventricular Tachycardia)    Non-Ischemic Cardiomyopathy    PAF (paroxysmal atrial fibrillation)    Ventricular fibrillation    H/O prior ablation treatment    GIB (gastrointestinal bleeding)    Hepatitis C virus    DVT of upper extremity (deep vein thrombosis)    Former smoker    HLD (hyperlipidemia)    Knee pain, right    H/O autoimmune hemolytic anemia    H/O hemolytic anemia    Atrial fibrillation        PAST SURGICAL HISTORY: AICD (Automatic Cardioverter/Defibrillator) Present    History of Prior Ablation Treatment    Hypertension    Hypertension    Status post left hip replacement    LVAD (left ventricular assist device) present    H/O heart transplant    S/P right heart catheterization        HOME MEDICATIONS:    ALLERGIES: No Known Allergies      FAMILY HISTORY: No pertinent family history in first degree relatives    No pertinent family history in first degree relatives        SOCIAL HISTORY:    REVIEW OF SYSTEMS:    VITAL SIGNS:  ICU Vital Signs Last 24 Hrs  T(C): 36.3 (22 Feb 2025 14:37), Max: 36.3 (21 Feb 2025 20:55)  T(F): 97.3 (22 Feb 2025 14:37), Max: 97.4 (21 Feb 2025 20:55)  HR: 85 (22 Feb 2025 10:58) (85 - 98)  BP: 95/58 (22 Feb 2025 10:58) (95/58 - 123/72)  BP(mean): --  ABP: --  ABP(mean): --  RR: 18 (22 Feb 2025 10:58) (18 - 18)  SpO2: 95% (22 Feb 2025 10:58) (95% - 100%)    O2 Parameters below as of 22 Feb 2025 10:58  Patient On (Oxygen Delivery Method): nasal cannula  O2 Flow (L/min): 2          PHYSICAL EXAMINATION:  General - lethargic, no acute distress  Neuro - awake, alert, oriented x1  Lungs -breathing comfortably on room air   Heart - pulse regular   Abdomen - softly distended, nontender  Extremities - all four extremities are warm     LABS:                          12.1   15.17 )-----------( 213      ( 22 Feb 2025 07:33 )             38.8       02-22    139  |  101  |  98[H]  ----------------------------<  120[H]  4.8   |  20[L]  |  4.65[H]    Ca    9.3      22 Feb 2025 07:32  Phos  8.6     02-22  Mg     2.7     02-22    TPro  6.3  /  Alb  2.6[L]  /  TBili  0.6  /  DBili  x   /  AST  60[H]  /  ALT  24  /  AlkPhos  66  02-22      PT/INR - ( 22 Feb 2025 04:17 )   PT: 15.9 sec;   INR: 1.39 ratio         PTT - ( 22 Feb 2025 04:17 )  PTT:35.6 sec    ABG - ( 21 Feb 2025 01:26 )  pH: x     /  pCO2: x     /  pO2: x     / HCO3: x     / Base Excess: x     /  SaO2: x       Lactate: 1.1                VBG - ( 22 Feb 2025 03:12 )  pH: 7.24  /  pCO2: 58    /  pO2: 25    / HCO3: 25    / Base Excess: -3.3  /  SaO2: 30.9   Lactate: 1.6                RECENT CULTURES:  Specimen Source: .Blood Blood-Peripheral  Date/Time: 02-19 @ 17:16  Culture Results:   Growth in aerobic bottle: Pseudomonas koreensis  Direct identification is available within approximately 3-5  hours either by Blood Panel Multiplexed PCR or Direct  MALDI-TOF. Details: [email or web address]  Gram Stain:   Growth in aerobic bottle: Gram Negative Rods[!]  Organism: Blood Culture PCR  Pseudomonas species[!]  Specimen Source: .Blood Blood-Peripheral  Date/Time: 02-19 @ 17:00  Culture Results:   No growth at 48 Hours  Gram Stain: --  Organism: --      CAPILLARY BLOOD GLUCOSE      POCT Blood Glucose.: 123 mg/dL (22 Feb 2025 11:36)  POCT Blood Glucose.: 133 mg/dL (22 Feb 2025 06:04)  POCT Blood Glucose.: 131 mg/dL (22 Feb 2025 02:24)  POCT Blood Glucose.: 131 mg/dL (21 Feb 2025 21:22)  POCT Blood Glucose.: 132 mg/dL (21 Feb 2025 18:46)      IMAGING STUDIES: Mr. Baez is a 74 year old man with PMHx HTN, HLD, nonischemic cardiomyopathy, chronic systolic heart failure s/p HM2 LVAD (6/2017), s/p heart transplant from Hep. 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 CTAP on admission with porcelain gallbladder and cholelithiasis, no acute findings. Hospital course significant for worsening renal function, pseudomonas koreensis bacteremia, and RRT this morning for coffee ground emesis (one episode). Patient with new hypothermia today and soft BP. WBC consistently elevated at 15. Repeat CT noncon today with new bowel dilation, possible ileus vs partial SBO. Contrast from 2/19 in stomach, but also passed into colon. Per chart review, patient passing bowel movements.    Upon evaluation, patient is lethargic, AOx1. Denies abdominal pain, nausea, or vomiting.      PAST MEDICAL HISTORY: CHF (Congestive Heart Failure)    HTN    SVT (Supraventricular Tachycardia)    Non-Ischemic Cardiomyopathy    PAF (paroxysmal atrial fibrillation)    Ventricular fibrillation    H/O prior ablation treatment    GIB (gastrointestinal bleeding)    Hepatitis C virus    DVT of upper extremity (deep vein thrombosis)    Former smoker    HLD (hyperlipidemia)    Knee pain, right    H/O autoimmune hemolytic anemia    H/O hemolytic anemia    Atrial fibrillation        PAST SURGICAL HISTORY: AICD (Automatic Cardioverter/Defibrillator) Present    History of Prior Ablation Treatment    Hypertension    Hypertension    Status post left hip replacement    LVAD (left ventricular assist device) present    H/O heart transplant    S/P right heart catheterization        HOME MEDICATIONS:    ALLERGIES: No Known Allergies      FAMILY HISTORY: No pertinent family history in first degree relatives    No pertinent family history in first degree relatives        SOCIAL HISTORY:    REVIEW OF SYSTEMS:    VITAL SIGNS:  ICU Vital Signs Last 24 Hrs  T(C): 36.3 (22 Feb 2025 14:37), Max: 36.3 (21 Feb 2025 20:55)  T(F): 97.3 (22 Feb 2025 14:37), Max: 97.4 (21 Feb 2025 20:55)  HR: 85 (22 Feb 2025 10:58) (85 - 98)  BP: 95/58 (22 Feb 2025 10:58) (95/58 - 123/72)  BP(mean): --  ABP: --  ABP(mean): --  RR: 18 (22 Feb 2025 10:58) (18 - 18)  SpO2: 95% (22 Feb 2025 10:58) (95% - 100%)    O2 Parameters below as of 22 Feb 2025 10:58  Patient On (Oxygen Delivery Method): nasal cannula  O2 Flow (L/min): 2          PHYSICAL EXAMINATION:  General - lethargic, no acute distress  Neuro - awake, alert, oriented x1  Lungs -breathing comfortably on room air   Heart - pulse regular   Abdomen - softly distended, nontender  Extremities - all four extremities are warm     LABS:                          12.1   15.17 )-----------( 213      ( 22 Feb 2025 07:33 )             38.8       02-22    139  |  101  |  98[H]  ----------------------------<  120[H]  4.8   |  20[L]  |  4.65[H]    Ca    9.3      22 Feb 2025 07:32  Phos  8.6     02-22  Mg     2.7     02-22    TPro  6.3  /  Alb  2.6[L]  /  TBili  0.6  /  DBili  x   /  AST  60[H]  /  ALT  24  /  AlkPhos  66  02-22      PT/INR - ( 22 Feb 2025 04:17 )   PT: 15.9 sec;   INR: 1.39 ratio         PTT - ( 22 Feb 2025 04:17 )  PTT:35.6 sec    ABG - ( 21 Feb 2025 01:26 )  pH: x     /  pCO2: x     /  pO2: x     / HCO3: x     / Base Excess: x     /  SaO2: x       Lactate: 1.1                VBG - ( 22 Feb 2025 03:12 )  pH: 7.24  /  pCO2: 58    /  pO2: 25    / HCO3: 25    / Base Excess: -3.3  /  SaO2: 30.9   Lactate: 1.6                RECENT CULTURES:  Specimen Source: .Blood Blood-Peripheral  Date/Time: 02-19 @ 17:16  Culture Results:   Growth in aerobic bottle: Pseudomonas koreensis  Direct identification is available within approximately 3-5  hours either by Blood Panel Multiplexed PCR or Direct  MALDI-TOF. Details: [email or web address]  Gram Stain:   Growth in aerobic bottle: Gram Negative Rods[!]  Organism: Blood Culture PCR  Pseudomonas species[!]  Specimen Source: .Blood Blood-Peripheral  Date/Time: 02-19 @ 17:00  Culture Results:   No growth at 48 Hours  Gram Stain: --  Organism: --      CAPILLARY BLOOD GLUCOSE      POCT Blood Glucose.: 123 mg/dL (22 Feb 2025 11:36)  POCT Blood Glucose.: 133 mg/dL (22 Feb 2025 06:04)  POCT Blood Glucose.: 131 mg/dL (22 Feb 2025 02:24)  POCT Blood Glucose.: 131 mg/dL (21 Feb 2025 21:22)  POCT Blood Glucose.: 132 mg/dL (21 Feb 2025 18:46)      IMAGING STUDIES:

## 2025-02-22 NOTE — PROGRESS NOTE ADULT - PROBLEM SELECTOR PLAN 10
continue home Toprol  mg PO QD  - holding home Losartan 75 mg PO QD due to hyperkalemia Home regimen : Eliquis 5 mg PO BID at home for hx of of afib and IJ thrombi  EKG on admission - nsr   [id number]  = 3  Last Dose : 2/20 AM   - currently holding in anticipation for LP 2/24

## 2025-02-22 NOTE — CONSULT NOTE ADULT - ASSESSMENT
74 M pmhx HTN, HLD, nonischemic cardiomyopathy, chronic systolic heart failure s/p HM2 LVAD (6/2017), s/p heart transplant from Hep. 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 If patient develops bloody stool or lactate increases would recommend CT angiogram with venous phase  - would send GI PCR, C. diff  - antifungal would be beneficial in immunocompromised patient with severe sepsis    discussed with Dr. Miller    Acute Care and Trauma Surgery  [id number]

## 2025-02-22 NOTE — PROGRESS NOTE ADULT - SUBJECTIVE AND OBJECTIVE BOX
INTERVAL HPI/OVERNIGHT EVENTS:    SUBJECTIVE: Patient seen and examined at bedside.    OBJECTIVE:    VITAL SIGNS:  ICU Vital Signs Last 24 Hrs  T(C): 35.8 (22 Feb 2025 05:24), Max: 36.5 (21 Feb 2025 11:36)  T(F): 96.5 (22 Feb 2025 05:24), Max: 97.7 (21 Feb 2025 11:36)  HR: 91 (22 Feb 2025 05:24) (91 - 105)  BP: 101/67 (22 Feb 2025 05:24) (92/62 - 123/72)  BP(mean): 85 (21 Feb 2025 08:53) (85 - 85)  ABP: --  ABP(mean): --  RR: 18 (22 Feb 2025 05:24) (18 - 18)  SpO2: 100% (22 Feb 2025 05:24) (95% - 100%)    O2 Parameters below as of 22 Feb 2025 05:24  Patient On (Oxygen Delivery Method): room air              02-21 @ 07:01  -  02-22 @ 07:00  --------------------------------------------------------  IN: 650 mL / OUT: 201 mL / NET: 449 mL      CAPILLARY BLOOD GLUCOSE      POCT Blood Glucose.: 133 mg/dL (22 Feb 2025 06:04)      PHYSICAL EXAM:    General: NAD  HEENT: NC/AT; PERRL, clear conjunctiva  Neck: supple  Respiratory: CTA b/l  Cardiovascular: +S1/S2; RRR  Abdomen: soft, NT/ND; +BS x4  Extremities:  no LE edema  Vascular: WWP, 2+ peripheral pulses b/l;  Skin: normal color and turgor; no rash  Neurological: A&Ox3, move all extremities. CN II-XII intact    MEDICATIONS:  MEDICATIONS  (STANDING):  atorvastatin 10 milliGRAM(s) Oral at bedtime  cholecalciferol 1000 Unit(s) Oral daily  dextrose 5%. 1000 milliLiter(s) (50 mL/Hr) IV Continuous <Continuous>  dextrose 5%. 1000 milliLiter(s) (100 mL/Hr) IV Continuous <Continuous>  dextrose 50% Injectable 25 Gram(s) IV Push once  dextrose 50% Injectable 12.5 Gram(s) IV Push once  dextrose 50% Injectable 25 Gram(s) IV Push once  glucagon  Injectable 1 milliGRAM(s) IntraMuscular once  insulin lispro (ADMELOG) corrective regimen sliding scale   SubCutaneous three times a day before meals  insulin lispro (ADMELOG) corrective regimen sliding scale   SubCutaneous at bedtime  meropenem  IVPB 1000 milliGRAM(s) IV Intermittent every 12 hours  meropenem  IVPB      metoprolol succinate  milliGRAM(s) Oral daily  nystatin    Suspension 097906 Unit(s) Oral four times a day  pantoprazole  Injectable 40 milliGRAM(s) IV Push two times a day  polyethylene glycol 3350 17 Gram(s) Oral daily  predniSONE   Tablet 15 milliGRAM(s) Oral daily  senna 2 Tablet(s) Oral daily  tacrolimus 1 milliGRAM(s) Oral two times a day  valGANciclovir 450 milliGRAM(s) Oral every 48 hours    MEDICATIONS  (PRN):  acetaminophen     Tablet .. 650 milliGRAM(s) Oral every 6 hours PRN Temp greater or equal to 38C (100.4F), Mild Pain (1 - 3)  dextrose Oral Gel 15 Gram(s) Oral once PRN Blood Glucose LESS THAN 70 milliGRAM(s)/deciliter  melatonin 3 milliGRAM(s) Oral at bedtime PRN Insomnia      ALLERGIES:  Allergies    No Known Allergies    Intolerances        LABS:                        12.9   16.49 )-----------( 225      ( 22 Feb 2025 03:47 )             41.1     02-22    139  |  102  |  96[H]  ----------------------------<  132[H]  4.6   |  20[L]  |  4.34[H]    Ca    9.2      22 Feb 2025 03:47  Phos  7.6     02-22  Mg     2.7     02-22    TPro  6.4  /  Alb  2.8[L]  /  TBili  0.6  /  DBili  x   /  AST  61[H]  /  ALT  24  /  AlkPhos  75  02-22    PT/INR - ( 22 Feb 2025 04:17 )   PT: 15.9 sec;   INR: 1.39 ratio         PTT - ( 22 Feb 2025 04:17 )  PTT:35.6 sec  Urinalysis Basic - ( 22 Feb 2025 03:47 )    Color: x / Appearance: x / SG: x / pH: x  Gluc: 132 mg/dL / Ketone: x  / Bili: x / Urobili: x   Blood: x / Protein: x / Nitrite: x   Leuk Esterase: x / RBC: x / WBC x   Sq Epi: x / Non Sq Epi: x / Bacteria: x        RADIOLOGY & ADDITIONAL TESTS: Reviewed. INTERVAL HPI/OVERNIGHT EVENTS: RRT overnight for 1 episode of coffee ground emesis.     SUBJECTIVE: Patient seen and examined at bedside. Patient feeling well, denying nausea, further vomiting, abdominal pain.     OBJECTIVE:    VITAL SIGNS:  ICU Vital Signs Last 24 Hrs  T(C): 35.8 (22 Feb 2025 05:24), Max: 36.5 (21 Feb 2025 11:36)  T(F): 96.5 (22 Feb 2025 05:24), Max: 97.7 (21 Feb 2025 11:36)  HR: 91 (22 Feb 2025 05:24) (91 - 105)  BP: 101/67 (22 Feb 2025 05:24) (92/62 - 123/72)  BP(mean): 85 (21 Feb 2025 08:53) (85 - 85)  ABP: --  ABP(mean): --  RR: 18 (22 Feb 2025 05:24) (18 - 18)  SpO2: 100% (22 Feb 2025 05:24) (95% - 100%)    O2 Parameters below as of 22 Feb 2025 05:24  Patient On (Oxygen Delivery Method): room air              02-21 @ 07:01  -  02-22 @ 07:00  --------------------------------------------------------  IN: 650 mL / OUT: 201 mL / NET: 449 mL      CAPILLARY BLOOD GLUCOSE      POCT Blood Glucose.: 133 mg/dL (22 Feb 2025 06:04)      PHYSICAL EXAM:    General: NAD  HEENT: NC/AT; PERRL, clear conjunctiva  Neck: supple  Respiratory: CTA b/l  Cardiovascular: +S1/S2; RRR  Abdomen: soft, distended, non tender, +BS  Extremities:  no LE edema  Vascular: WWP, 2+ peripheral pulses b/l;  Skin: bullae noted on bilateral upper extremities  Neurological: A&Ox2, move all extremities.     MEDICATIONS:  MEDICATIONS  (STANDING):  atorvastatin 10 milliGRAM(s) Oral at bedtime  cholecalciferol 1000 Unit(s) Oral daily  dextrose 5%. 1000 milliLiter(s) (50 mL/Hr) IV Continuous <Continuous>  dextrose 5%. 1000 milliLiter(s) (100 mL/Hr) IV Continuous <Continuous>  dextrose 50% Injectable 25 Gram(s) IV Push once  dextrose 50% Injectable 12.5 Gram(s) IV Push once  dextrose 50% Injectable 25 Gram(s) IV Push once  glucagon  Injectable 1 milliGRAM(s) IntraMuscular once  insulin lispro (ADMELOG) corrective regimen sliding scale   SubCutaneous three times a day before meals  insulin lispro (ADMELOG) corrective regimen sliding scale   SubCutaneous at bedtime  meropenem  IVPB 1000 milliGRAM(s) IV Intermittent every 12 hours  meropenem  IVPB      metoprolol succinate  milliGRAM(s) Oral daily  nystatin    Suspension 303006 Unit(s) Oral four times a day  pantoprazole  Injectable 40 milliGRAM(s) IV Push two times a day  polyethylene glycol 3350 17 Gram(s) Oral daily  predniSONE   Tablet 15 milliGRAM(s) Oral daily  senna 2 Tablet(s) Oral daily  tacrolimus 1 milliGRAM(s) Oral two times a day  valGANciclovir 450 milliGRAM(s) Oral every 48 hours    MEDICATIONS  (PRN):  acetaminophen     Tablet .. 650 milliGRAM(s) Oral every 6 hours PRN Temp greater or equal to 38C (100.4F), Mild Pain (1 - 3)  dextrose Oral Gel 15 Gram(s) Oral once PRN Blood Glucose LESS THAN 70 milliGRAM(s)/deciliter  melatonin 3 milliGRAM(s) Oral at bedtime PRN Insomnia      ALLERGIES:  Allergies    No Known Allergies    Intolerances        LABS:                        12.9   16.49 )-----------( 225      ( 22 Feb 2025 03:47 )             41.1     02-22    139  |  102  |  96[H]  ----------------------------<  132[H]  4.6   |  20[L]  |  4.34[H]    Ca    9.2      22 Feb 2025 03:47  Phos  7.6     02-22  Mg     2.7     02-22    TPro  6.4  /  Alb  2.8[L]  /  TBili  0.6  /  DBili  x   /  AST  61[H]  /  ALT  24  /  AlkPhos  75  02-22    PT/INR - ( 22 Feb 2025 04:17 )   PT: 15.9 sec;   INR: 1.39 ratio         PTT - ( 22 Feb 2025 04:17 )  PTT:35.6 sec  Urinalysis Basic - ( 22 Feb 2025 03:47 )    Color: x / Appearance: x / SG: x / pH: x  Gluc: 132 mg/dL / Ketone: x  / Bili: x / Urobili: x   Blood: x / Protein: x / Nitrite: x   Leuk Esterase: x / RBC: x / WBC x   Sq Epi: x / Non Sq Epi: x / Bacteria: x        RADIOLOGY & ADDITIONAL TESTS: Reviewed.

## 2025-02-22 NOTE — PROGRESS NOTE ADULT - ASSESSMENT
74 M pmhx HTN, HLD, nonischemic cardiomyopathy, chronic systolic heart failure s/p HM2 LVAD (6/2017), s/p heart transplant from Hep. 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

## 2025-02-23 NOTE — CHART NOTE - NSCHARTNOTEFT_GEN_A_CORE
worsening renal function with creat >6, from 4 on friday and Bun >120.   nphro following  plan for admission to MICU for CVVH   bactremia with klebsiella  cont ABx  Tacro trough at 7am is 5, rpt at 9.30 after am dose  will cont tacro 1mg bid  cont pred for Nicole syndrome    d/w MICU team

## 2025-02-23 NOTE — PROGRESS NOTE ADULT - ASSESSMENT
74yo M pmhx HTN, HLD, nonischemic cardiomyopathy, chronic systolic heart failure s/p HM2 LVAD (6/2017), s/p heart transplant from Hep. 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 Pt here with change in MS, hyperkalemia and norman.   Patient was given Calcium Gluconate 2g, D50/Insulin 5 unit, 40 mg IV lasix, Lokelma 10mg.   Vancomycin and Zosyn       Hyperkalemia:  Pt with K level of 6.7 that improved to 6.1 after medical management with additional dose of insulin given.   K 5.6 initially - On Lokelma 10 TID.   Serum k is better.   Monitor labs.     Stage 3 chronic kidney disease: with superimposed NORMAN:  Patient with known history of CKD. Upon HIE review, Scr ranged from 1.3-2.1 in 2023. Most recent Scr was 1.75 on 1/30/24.   Scr is worsening now.  2.49 --->2.75--> 4.34--> 4.65---> 6.64 today.   CPK is down trending. - Pt denies any seizure before coming to hospital. (Though not reliable history)  NORMAN likely in setting of ? underlying infection related ATN.   Transplant ID reccs appreciated.   Pt has respiratory acidosis as well along with metabolic acidosis.   neurology is on board.     PLAN:  Phos binders work with PO diet only.   Resp acidosis management as per primary team.   Pt has urine ketones - check BHB.   Pt has worsening kidney function - May need renal replacement therapy. Please get a non- tunnelled HD cath in.   Check labs at 7pm today.   BP is still low. May need CRRT. (Please consider micu eval)  Monitor labs, daily wts and I/Os.   Check tacro trough daily.   Check urine lytes.  on empirical Abx. Infectious work up as per primary team.   Dose medications as per eGFR for now.       Called pt's care taker - Tahira Quintero - explained his condition from nephrology stand point. She verbalized her understanding and consented for hemodialysis/ CRRT, if needed.

## 2025-02-23 NOTE — PROGRESS NOTE ADULT - ATTENDING COMMENTS
74yo M pmhx HTN, HLD, nonischemic cardiomyopathy, chronic systolic heart failure s/p HM2 LVAD (6/2017), s/p heart transplant from Hep. 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    1. Metabolic encephalopathy  -Bcx 2/20 w/ Pseudomonas koreensis, Ucx NGTD, repeat cultures 2/22 pending   - on meropenem- ID following  -LP when safe off Eliquis- Likely ?Monday      2. Worsening NORMAN:  Appreciate Nephrology Input. Tentative plan for RRT    3. 1 episode of emesis overnight 1/22- ? coffee ground  unclear if true GIB  Hb stable   CT A/P reviewed.     4. Cardiac Transplant  -Adjustment of cardiac transplant meds as per transplant team.

## 2025-02-23 NOTE — PROGRESS NOTE ADULT - SUBJECTIVE AND OBJECTIVE BOX
[facility] Division of Kidney Diseases & Hypertension  FOLLOW UP NOTE  [phone]--------------------------------------------------------------------------------  Chief Complaint:Hyperkalemia, NORMAN    24 hour events/subjective:  Pt was seen and examined earlier today. Pt is more responsive today. But is still AAO x 1-2. Pt was hypothermic yesterday.   ROS is limited.         PAST HISTORY  --------------------------------------------------------------------------------  No significant changes to PMH, PSH, FHx, SHx, unless otherwise noted    ALLERGIES & MEDICATIONS  --------------------------------------------------------------------------------  Allergies    No Known Allergies    Intolerances      Standing Inpatient Medications  atorvastatin 10 milliGRAM(s) Oral at bedtime  chlorhexidine 2% Cloths 1 Application(s) Topical daily  cholecalciferol 1000 Unit(s) Oral daily  dextrose 5%. 1000 milliLiter(s) IV Continuous <Continuous>  dextrose 5%. 1000 milliLiter(s) IV Continuous <Continuous>  dextrose 50% Injectable 25 Gram(s) IV Push once  dextrose 50% Injectable 12.5 Gram(s) IV Push once  dextrose 50% Injectable 25 Gram(s) IV Push once  glucagon  Injectable 1 milliGRAM(s) IntraMuscular once  insulin lispro (ADMELOG) corrective regimen sliding scale   SubCutaneous three times a day before meals  insulin lispro (ADMELOG) corrective regimen sliding scale   SubCutaneous at bedtime  lactated ringers Bolus 500 milliLiter(s) IV Bolus once  meropenem  IVPB 1000 milliGRAM(s) IV Intermittent every 12 hours  meropenem  IVPB      metoprolol succinate  milliGRAM(s) Oral daily  midodrine 5 milliGRAM(s) Oral every 8 hours  nystatin    Suspension 320711 Unit(s) Oral four times a day  pantoprazole  Injectable 40 milliGRAM(s) IV Push two times a day  polyethylene glycol 3350 17 Gram(s) Oral daily  predniSONE   Tablet 15 milliGRAM(s) Oral daily  senna 2 Tablet(s) Oral daily  sevelamer carbonate 1600 milliGRAM(s) Oral every 8 hours  tacrolimus 1 milliGRAM(s) Oral two times a day  valGANciclovir 450 milliGRAM(s) Oral every 48 hours    PRN Inpatient Medications  acetaminophen     Tablet .. 650 milliGRAM(s) Oral every 6 hours PRN  dextrose Oral Gel 15 Gram(s) Oral once PRN  melatonin 3 milliGRAM(s) Oral at bedtime PRN      REVIEW OF SYSTEMS  --------------------------------------------------------------------------------  ROS is limited.    VITALS/PHYSICAL EXAM  --------------------------------------------------------------------------------  T(C): 33.3 (02-23-25 @ 12:01), Max: 37.3 (02-23-25 @ 05:20)  HR: 73 (02-23-25 @ 10:48) (73 - 91)  BP: 93/62 (02-23-25 @ 10:48) (84/57 - 102/69)  RR: 18 (02-23-25 @ 10:48) (18 - 20)  SpO2: 97% (02-23-25 @ 10:48) (95% - 100%)  Wt(kg): --        02-22-25 @ 07:01  -  02-23-25 @ 07:00  --------------------------------------------------------  IN: 0 mL / OUT: 25 mL / NET: -25 mL      Physical Exam:  Gen: NAD  Pulm: CTA B/L  CV: S1S2  Abd: Soft, +BS   Ext: No LE edema B/L  Neuro: AAO x0  Skin: Warm and dry    LABS/STUDIES  --------------------------------------------------------------------------------              11.2   11.87 >-----------<  209      [02-23-25 @ 09:51]              36.4     140  |  101  |  106  ----------------------------<  114      [02-23-25 @ 09:51]  4.4   |  20  |  6.64        Ca     8.5     [02-23-25 @ 09:51]      Mg     2.9     [02-23-25 @ 09:51]      Phos  10.7     [02-23-25 @ 09:51]    TPro  5.5  /  Alb  2.5  /  TBili  0.4  /  DBili  x   /  AST  43  /  ALT  20  /  AlkPhos  60  [02-23-25 @ 09:51]    PT/INR: PT 15.9 , INR 1.39       [02-22-25 @ 04:17]  PTT: 35.6       [02-22-25 @ 04:17]      Creatinine Trend:  SCr 6.64 [02-23 @ 09:51]  SCr 3.80 [02-23 @ 07:08]  SCr 4.65 [02-22 @ 07:32]  SCr 4.34 [02-22 @ 03:47]  SCr 2.75 [02-21 @ 09:36]      TSH 2.57      [02-19-25 @ 17:28]  Lipid: chol 95, TG 65, HDL 55, LDL --      [02-20-25 @ 05:35]      Syphilis Screen (Treponema Pallidum Ab) Negative      [02-21-25 @ 09:38]

## 2025-02-23 NOTE — PROGRESS NOTE ADULT - PROBLEM SELECTOR PLAN 2
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    - ERAS protocol  - q4h checks with exam  - Xeroform/telfa/ace to STSG  - ok for aquacel to donor site starting 5/25  - pain control as needed  - keep >    Padilla Mujica MD  Plastic and Reconstructive Surgery, PGY-1  [facility]: [id number] - Patient baseline AOx3, AOx1 currently, iso of infection vs metabolic derangements (uremic encephelopathy)   - CTH and angio w/o hemorrhage or stenosis   - TSH wnl / B12, CK 2200  - Fall precautions and dysphagia screen  - spot EEG: negative for seizures     > Neurology recs appreciated   > Pending Infectious workup and possible LP 2/24

## 2025-02-23 NOTE — PROGRESS NOTE ADULT - ATTENDING COMMENTS
Pt. with worsening kidney function and hyperkalemia in the setting of ?GIB and sepsis. Plan for urgent HD today. Monitor BMP. Avoid nephrotoxins.

## 2025-02-23 NOTE — PROGRESS NOTE ADULT - PROBLEM SELECTOR PLAN 7
- Nonischemic cardiomyopathy, chronic systolic heart failure s/p HM2 LVAD (6/2017), s/p heart transplant from Hep. C donor (treated) 2/23/18   - Home Tacrolimus dose 2mg BID, Pred 15mg qd    > Continue with home Pred 15mg qd  > Hold home Bactrim given hyperkalemia - can consider Atovaquone  > c/w home ASA, Atorvastatin (therapeutic interchange)   > c/w home Metoprolol from 200mg with hold parameters   > Tacro level daily : home regimen 2mg BID, c/w 1mg BID  Note:  intolerant of Cellcept due to leukopenia - Nonischemic cardiomyopathy, chronic systolic heart failure s/p HM2 LVAD (6/2017), s/p heart transplant from Hep. C donor (treated) 2/23/18   - Home Tacrolimus dose 2mg BID, Pred 15mg qd    > Continue with home Pred 15mg qd  > Hold home Bactrim given hyperkalemia - can consider Atovaquone  > Holding ASA for now; if no more GIB and HgB stable, can re-start  > c/w home Atorvastatin (therapeutic interchange)   > c/w home Metoprolol from 200mg with hold parameters   > Tacro level daily : home regimen 2mg BID, c/w 1mg BID  Note:  intolerant of Cellcept due to leukopenia - Nonischemic cardiomyopathy, chronic systolic heart failure s/p HM2 LVAD (6/2017), s/p heart transplant from Hep. C donor (treated) 2/23/18   - Home Tacrolimus dose 2mg BID, Pred 15mg qd    > Continue with home Pred 15mg qd  > Hold home Bactrim given hyperkalemia - can consider Atovaquone  > Holding ASA for now; if no more GIB and HgB stable, can re-start  > c/w home Atorvastatin (therapeutic interchange)   > HOLD home Metoprolol from 200mg with hold parameters due to hypotension (02/23)  > Tacro level daily : home regimen 2mg BID, c/w 1mg BID  Note:  intolerant of Cellcept due to leukopenia

## 2025-02-23 NOTE — PROGRESS NOTE ADULT - PROBLEM SELECTOR PLAN 1
- 1 episode of coffee ground emesis overnight  - unclear if true GIB  - Hb stable   - Holding DVT ppx and ASA for now  - CTAP (2/22) ileus vs partial SBO - cannot r/o GI bleed    > monitor for further episodes  > c/w PPI IV 40 BID   > if repeat episode, GI consult   > If repeat episode or worsening lactate, CT angiogram with venous phase - 1 episode of coffee ground emesis overnight  - unclear if true GIB  - Hb stable   - Holding DVT ppx and ASA for now  - CTAP (2/22) ileus vs partial SBO - cannot r/o GI bleed    > monitor for further episodes  > c/w PPI IV 40 BID   > c/w trending CBC Q12H  > Holding DVT Ppx/ASA for now; if no more GIB and HgB stable, can re-start  > if repeat episode, GI consult   > If repeat episode or worsening lactate, CT angiogram with venous phase

## 2025-02-23 NOTE — PROGRESS NOTE ADULT - PROBLEM SELECTOR PLAN 4
- K 6.2 - shifted in the ED, no EKG changes consistent with severe hyper K. Cr not significantly elevated form baseline, c/f RTA from bactrim and Tacro. Stable   - s/p Lokelma 10mg BID for 3 days    > Hold home ARB  > f/u Tacro level daily   > Hold home bactrim - ID consult regarding Atovaquone   > c/w monitoring patient on Tele

## 2025-02-23 NOTE — PROVIDER CONTACT NOTE (OTHER) - REASON
Attempting to put an IV
HYPOTENSION
Critical lactate venous 3.2
Pt has no IV access and has medication for meropenum ordered.

## 2025-02-23 NOTE — CHART NOTE - NSCHARTNOTEFT_GEN_A_CORE
Patient seen and examined at bedside   Denies pain, abdomen mildly distended, not painful to palpation  Reports passing flatus, low suspicion of a SBO at this time  Please call back with questions    Surgery, [id number]

## 2025-02-23 NOTE — PROGRESS NOTE ADULT - PROBLEM SELECTOR PLAN 10
- Home regimen : Eliquis 5 mg PO BID at home for hx of of afib and IJ thrombi  - EKG on admission - nsr   - [id number]  = 3  - Last Dose : 2/20 AM     > currently Eliquis holding in anticipation for LP 2/24

## 2025-02-23 NOTE — PROGRESS NOTE ADULT - PROBLEM SELECTOR PLAN 12
- Extremities appear cool and dry     > c/w Local wound care by podiatry   > Podiatry recommendations - pending SHANEKA studies, consider vascular evaluation if abnormal

## 2025-02-23 NOTE — PROGRESS NOTE ADULT - PROBLEM SELECTOR PLAN 3
- Presented with 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, surgery stated that this is an unlikely source     > Bcx 2/20 w/ Pseudomonas koreensis, Ucx NGTD, repeat cultures 2/22 pending   > c/w Meropenem (2/21- ), Hold off additional doses of Vancomycin   > c/w home Valganciclovir for ppx renally dosed   > Consider anti-fungal coverage as patient is immunocompromised   > f/u infectious labs - Toxoplasma, EBV, Fungitell, Galactomannan, CMV, Cryptococcus, MRSA, ASCENCION virus, CMV, Crypto, ASCENCION virus  > f/u GI PCR, consider C. Diff testing   > CTC pending   > Transplant ID recommendations   > LP pending Monday 2/24 - Presented with 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, surgery stated that this is an unlikely source     > f/u MICU consult recs  > Ordered Midodrine 5mg TID (2/23-  > Bcx 2/20 w/ Pseudomonas koreensis, Ucx NGTD, repeat cultures 2/22 pending   > c/w Meropenem (2/21- ), Hold off additional doses of Vancomycin   > c/w home Valganciclovir for ppx renally dosed   > Consider anti-fungal coverage as patient is immunocompromised   > f/u infectious labs - Toxoplasma, EBV, Fungitell, Galactomannan, CMV, Cryptococcus, MRSA, ASCENCION virus, CMV, Crypto, ASCENCION virus  > f/u GI PCR, consider C. Diff testing   > CTC pending   > Transplant ID recommendations   > LP pending Monday 2/24

## 2025-02-23 NOTE — PROVIDER CONTACT NOTE (CRITICAL VALUE NOTIFICATION) - ACTION/TREATMENT ORDERED:
provider made aware, continue antibiotics, will continue to monitor
continue antibiotics, will continue to monitor
will transfer the patient to MICU for further mangement
Sha Valderrama notified about test result will continue to monitor

## 2025-02-23 NOTE — PROVIDER CONTACT NOTE (OTHER) - ACTION/TREATMENT ORDERED:
provider made aware, will continue to monitor
Contacted ACP. Interventions: Since pt has no IV access as of yet. Encourage pt to intake water
Contacted ACP. Interventions: Day ACP Lavelle chao attempted for US guided IV with patient however pt combative. Clinical impact team will come try one pt is settled.
500cc bolus LR, stat cbc. rectal temp done 99.1
Provider notified. Per provider will assess veins later.

## 2025-02-23 NOTE — PROGRESS NOTE ADULT - SUBJECTIVE AND OBJECTIVE BOX
INFECTIOUS DISEASES FOLLOW UP-- Sujey Lujan  [phone]    This is a follow up note for this  74yMale with  Hyperkalemia  uremia in the setting of minimal urine output- transferred to the MICU to start dialysis        ROS:  CONSTITUTIONAL: awake and recognized me, but slowed mentation    Allergies    No Known Allergies    Intolerances        ANTIBIOTICS/RELEVANT:  antimicrobials  meropenem  IVPB 1000 milliGRAM(s) IV Intermittent every 24 hours  nystatin    Suspension 120277 Unit(s) Oral four times a day  valGANciclovir 450 milliGRAM(s) Oral <User Schedule>    immunologic:  tacrolimus 1 milliGRAM(s) Oral two times a day    OTHER:  atorvastatin 10 milliGRAM(s) Oral at bedtime  chlorhexidine 2% Cloths 1 Application(s) Topical daily  cholecalciferol 1000 Unit(s) Oral daily  dextrose 5%. 1000 milliLiter(s) IV Continuous <Continuous>  dextrose 5%. 1000 milliLiter(s) IV Continuous <Continuous>  dextrose 50% Injectable 25 Gram(s) IV Push once  dextrose 50% Injectable 12.5 Gram(s) IV Push once  dextrose 50% Injectable 25 Gram(s) IV Push once  dextrose Oral Gel 15 Gram(s) Oral once PRN  glucagon  Injectable 1 milliGRAM(s) IntraMuscular once  insulin lispro (ADMELOG) corrective regimen sliding scale   SubCutaneous three times a day before meals  insulin lispro (ADMELOG) corrective regimen sliding scale   SubCutaneous at bedtime  lactated ringers. 1000 milliLiter(s) IV Continuous <Continuous>  midodrine 5 milliGRAM(s) Oral every 8 hours  pantoprazole  Injectable 40 milliGRAM(s) IV Push two times a day  polyethylene glycol 3350 17 Gram(s) Oral daily  predniSONE   Tablet 15 milliGRAM(s) Oral daily  senna 2 Tablet(s) Oral daily  sevelamer carbonate 1600 milliGRAM(s) Oral every 8 hours      Objective:  Vital Signs Last 24 Hrs  T(C): 34.8 (23 Feb 2025 16:00), Max: 37.3 (23 Feb 2025 05:20)  T(F): 94.6 (23 Feb 2025 16:00), Max: 99.1 (23 Feb 2025 05:20)  HR: 80 (23 Feb 2025 16:00) (73 - 91)  BP: 87/52 (23 Feb 2025 16:00) (84/57 - 108/54)  BP(mean): 67 (23 Feb 2025 16:00) (67 - 78)  RR: 26 (23 Feb 2025 16:00) (13 - 26)  SpO2: 94% (23 Feb 2025 16:00) (93% - 100%)    Parameters below as of 23 Feb 2025 14:15  Patient On (Oxygen Delivery Method): room air        PHYSICAL EXAM:  Constitutional: weak, anasarca  Eyes:WALT, EOMI, ashen  Ear/Nose/Throat: no oral lesions, new NG tube	  Respiratory: clear BL  Cardiovascular: S1S2 distant  Gastrointestinal:soft, (+) BS, no tenderness  Extremities:no e/e/c bullae on RUE and sloughed skin LUE  No Lymphadenopathy  IV sites not inflammed.  skin- with new bullous lesion RUE    LABS:                        10.6   10.29 )-----------( 201      ( 23 Feb 2025 15:38 )             33.8     02-23    137  |  101  |  122[H]  ----------------------------<  122[H]  4.6   |  15[L]  |  6.68[H]    Ca    8.3[L]      23 Feb 2025 15:39  Phos  10.8     02-23  Mg     2.8     02-23    TPro  5.4[L]  /  Alb  2.3[L]  /  TBili  0.4  /  DBili  x   /  AST  45[H]  /  ALT  21  /  AlkPhos  60  02-23    PT/INR - ( 23 Feb 2025 15:38 )   PT: 12.3 sec;   INR: 1.07 ratio         PTT - ( 23 Feb 2025 15:38 )  PTT:22.5 sec  Urinalysis Basic - ( 23 Feb 2025 15:39 )    Color: x / Appearance: x / SG: x / pH: x  Gluc: 122 mg/dL / Ketone: x  / Bili: x / Urobili: x   Blood: x / Protein: x / Nitrite: x   Leuk Esterase: x / RBC: x / WBC x   Sq Epi: x / Non Sq Epi: x / Bacteria: x        MICROBIOLOGY:            RECENT CULTURES:  02-22 @ 03:11  .Blood Blood-Peripheral  --  --  --    No growth at 24 hours  --  02-19 @ 18:19  Clean Catch Clean Catch (Midstream)  --  --  --    50,000 - 99,000 CFU/mL Alpha hemolytic strep "Susceptibilities not  performed"  --  02-19 @ 17:16  .Blood Blood-Peripheral  Blood Culture PCR  Pseudomonas species  Blood Culture PCR  PCR    Growth in aerobic bottle: Pseudomonas koreensis  Direct identification is available within approximately 3-5  hours either by Blood Panel Multiplexed PCR or Direct  MALDI-TOF. Details: [email or web address]  --  02-19 @ 17:00  .Blood Blood-Peripheral  --  --  --    No growth at 72 Hours  --      RADIOLOGY & ADDITIONAL STUDIES:    Urine Microscopic-Add On (NC) (02.23.25 @ 15:10)    White Blood Cell - Urine: 48 /HPF   Red Blood Cell - Urine: 91 /HPF   Bacteria: Few /HPF   Cast: >63 /LPF   Hyaline Casts: Present   Epithelial Cells: 5 /HPF   Review: Reviewed

## 2025-02-23 NOTE — PROGRESS NOTE ADULT - PROBLEM SELECTOR PLAN 5
- Known hx of CKD 3, Cr 2.4 uptrending  - Scr ranged from 1.3-2.1 in 2023. Most recent Scr was 1.75 on 1/30/24. On admission, Scr was 1.9 and is worsening now. UA showed trace ketones.   - No improvement with IVF, likely ATN i/s/o contrast/medications.     > Hold home ARB for now   > Euvolemic - will hold home Bumex 0.5mg  > c/w renvela for hyperphosphatemia - Known hx of CKD 3, Cr 2.4 uptrending  - Scr ranged from 1.3-2.1 in 2023. Most recent Scr was 1.75 on 1/30/24. On admission, Scr was 1.9 and is worsening now. UA showed trace ketones.   - No improvement with IVF, likely ATN i/s/o contrast/medications.     > IR consult for cath placement for possible HD per nephrology team as the patient has worsening kidney function  > Hold home ARB for now   > Euvolemic - will hold home Bumex 0.5mg  > c/w renvela for hyperphosphatemia

## 2025-02-23 NOTE — PROGRESS NOTE ADULT - ASSESSMENT
74 M pmhx HTN, HLD, nonischemic cardiomyopathy, chronic systolic heart failure s/p HM2 LVAD (6/2017), s/p heart transplant from Hep. 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  74 M pmhx HTN, HLD, nonischemic cardiomyopathy, chronic systolic heart failure s/p HM2 LVAD (6/2017), s/p heart transplant from Hep. 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

## 2025-02-23 NOTE — PROGRESS NOTE ADULT - ASSESSMENT
74yo M pmhx HTN, HLD, CKD, nonischemic cardiomyopathy, chronic systolic heart failure s/p HM2 LVAD (6/2017), s/p heart transplant from Hep. 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, presenting to the hospital with AMS, hypothermia, and hyperkalemia, with concern for sepsis    Patient with blood culture growing GNR identified as pseudomonas koreensis  Organisms usually found in water and soil but can be found in medical devices and food  Sensitivity pattern pending  Can continue IV Meropenem pending sensitivity results  Please send repeat blood cultures to look for bacteremia clearance  follow up UA and culture  Abdomen soft on exam but 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

## 2025-02-23 NOTE — CHART NOTE - NSCHARTNOTEFT_GEN_A_CORE
: Sanket    INDICATION: Shock    PROCEDURE:  [x ] LIMITED ECHO  [ x] LIMITED CHEST  [ ] LIMITED RETROPERITONEAL  [ ] LIMITED ABDOMINAL  [ ] LIMITED DVT  [ ] NEEDLE GUIDANCE VASCULAR  [ ] NEEDLE GUIDANCE THORACENTESIS  [ ] NEEDLE GUIDANCE PARACENTESIS  [ ] NEEDLE GUIDANCE PERICARDIOCENTESIS  [ ] OTHER    FINDINGS: 1. A line predominantly  bilaterally. No pleural effusions.                  2. Bowing of septum in systole. Normal LV systolic function.           INTERPRETATION: 1. Normal Lungs.                             2. Bowing of septum in systole. Normal LV systolic function.    I have assisted and supervised entire procedure.      Korey Bonilla MD

## 2025-02-23 NOTE — PROVIDER CONTACT NOTE (CRITICAL VALUE NOTIFICATION) - SITUATION
blood culture collected on 2/19/2025  preliminary result shows growth in aerobic bottle gram negative rods
pt admitted for hyperkalemia
PH venous 7.17
blood culture collected on 2/19  shows growth in aerobic bottles psuedomonus koreensis

## 2025-02-23 NOTE — PROVIDER CONTACT NOTE (CRITICAL VALUE NOTIFICATION) - TEST AND RESULT REPORTED:
Blood Culture- Aerobic bottle positive for pseudomonas koreensis
positive blood culture
positive blood culture
Potassium 6.2
PH venous 7.17

## 2025-02-23 NOTE — PROVIDER CONTACT NOTE (OTHER) - SITUATION
Pt has no IV access and has medication for meropenum ordered.
Attempted to put an IV with manager. IV unsuccessful. Pt is very combative and hard stick. Pt had 2 previous IVs placed and infiltrated.
Critical lactate venous 3.2
right arm blisters
pt noted with BP 84/57 hr 74 02 97%. temp 98.1

## 2025-02-23 NOTE — PROVIDER CONTACT NOTE (CRITICAL VALUE NOTIFICATION) - NAME OF MD/NP/PA/DO NOTIFIED:
[id number] MAR hold attempted to call x3
Lavelle Dalton
DR Sha Valderrama
DR Alcon Powell
Dr Gabino Marinelli

## 2025-02-23 NOTE — PROGRESS NOTE ADULT - PROBLEM SELECTOR PLAN 6
- hx of hemolytic anemia, follows with Dr. Rosario as outpatient  - remains on Prednisone 15 mg PO QD     > f/u hematology/oncology recs  > Monitor H&H daily

## 2025-02-23 NOTE — CONSULT NOTE ADULT - ASSESSMENT
Interventional Radiology    Evaluate for Procedure:     HPI: 75yo M w/ pmhx HTN, HLD, nonischemic cardiomyopathy, chronic systolic heart failure s/p HM2 LVAD (6/2017), s/p heart transplant from Hep. 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 Found to have septic shock, (2/19 BCx pseudo, 2/22 BCx NGTD). Still episodes of hypotension. Worsening NORMAN requiring HD. IR c/s for non tunneled HD access.    Allergies: No Known Allergies    Medications (Abx/Cardiac/Anticoagulation/Blood Products)    heparin   Injectable: 5000 Unit(s) SubCutaneous (02-21 @ 21:24)  meropenem  IVPB: 100 mL/Hr IV Intermittent (02-23 @ 05:22)  metoprolol succinate ER: 200 milliGRAM(s) Oral (02-22 @ 06:32)  midodrine: 5 milliGRAM(s) Oral (02-23 @ 12:59)  nystatin    Suspension: 089240 Unit(s) Oral (02-23 @ 12:12)  valGANciclovir: 450 milliGRAM(s) Oral (02-22 @ 06:33)    Data:    T(C): 34.8  HR: 80  BP: 87/52  RR: 26  SpO2: 94%    -WBC 10.29 / HgB 10.6 / Hct 33.8 / Plt 201  -Na 137 / Cl 101 /  / Glucose 122  -K 4.6 / CO2 15 / Cr 6.68  -ALT 21 / Alk Phos 60 / T.Bili 0.4  -INR 1.07 / PTT 22.5      Radiology:     Assessment/Plan:   75yo M w/ pmhx HTN, HLD, nonischemic cardiomyopathy, chronic systolic heart failure s/p HM2 LVAD (6/2017), s/p heart transplant from Hep. 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 Found to have septic shock, (2/19 BCx pseudo, 2/22 BCx NGTD). Still episodes of hypotension. Worsening NORMAN requiring HD. IR c/s for non tunneled HD access.    -- Given patient is now in MICU, will defer placement to MICU team. Please re-consult as needed.    Dago Mendoza M.D.  PGY5/R4, Interventional Radiology Senior Resident    -Available on [employer] TEAMS for all non-urgent questions  -Emergent issues: [facility]-p.[phone]; [facility]-p.[phone] ([phone])  -Non-emergent consults: Please place a [place] order "Consult-Interventional Radiology" with an appropriate callback number  -Scheduling questions: [facility]: [phone]; [facility]: [phone]  -Clinic/Outpatient booking: [facility]: [phone]; [facility]: [phone]   Interventional Radiology    Evaluate for Procedure:     HPI: 73yo M w/ pmhx HTN, HLD, nonischemic cardiomyopathy, chronic systolic heart failure s/p HM2 LVAD (6/2017), s/p heart transplant from Hep. 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 Found to have septic shock, (2/19 BCx pseudo, 2/22 BCx NGTD). Still episodes of hypotension. Worsening NORMAN requiring HD. IR c/s for non tunneled HD access.    Allergies: No Known Allergies    Medications (Abx/Cardiac/Anticoagulation/Blood Products)    heparin   Injectable: 5000 Unit(s) SubCutaneous (02-21 @ 21:24)  meropenem  IVPB: 100 mL/Hr IV Intermittent (02-23 @ 05:22)  metoprolol succinate ER: 200 milliGRAM(s) Oral (02-22 @ 06:32)  midodrine: 5 milliGRAM(s) Oral (02-23 @ 12:59)  nystatin    Suspension: 293973 Unit(s) Oral (02-23 @ 12:12)  valGANciclovir: 450 milliGRAM(s) Oral (02-22 @ 06:33)    Data:    T(C): 34.8  HR: 80  BP: 87/52  RR: 26  SpO2: 94%    -WBC 10.29 / HgB 10.6 / Hct 33.8 / Plt 201  -Na 137 / Cl 101 /  / Glucose 122  -K 4.6 / CO2 15 / Cr 6.68  -ALT 21 / Alk Phos 60 / T.Bili 0.4  -INR 1.07 / PTT 22.5      Radiology:     Assessment/Plan:   73yo M w/ pmhx HTN, HLD, nonischemic cardiomyopathy, chronic systolic heart failure s/p HM2 LVAD (6/2017), s/p heart transplant from Hep. 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 Found to have septic shock, (2/19 BCx pseudo, 2/22 BCx NGTD). Still episodes of hypotension. Worsening NORMAN requiring HD. IR c/s for non tunneled HD access.    -- Given patient is now in MICU, will defer placement to MICU team. Please re-consult as needed.      Dago Mendoza M.D.  PGY5/R4, Interventional Radiology Senior Resident    -Available on [employer] TEAMS for all non-urgent questions  -Emergent issues: [facility]-p.[phone]; [facility]-p.[phone] ([phone])  -Non-emergent consults: Please place a [place] order "Consult-Interventional Radiology" with an appropriate callback number  -Scheduling questions: [facility]: [phone]; [facility]: [phone]  -Clinic/Outpatient booking: [facility]: [phone]; [facility]: [phone]

## 2025-02-23 NOTE — PROGRESS NOTE ADULT - SUBJECTIVE AND OBJECTIVE BOX
SUBJECTIVE / OVERNIGHT EVENTS:  Pt seen and examined at bedside. RADHA.    MEDICATIONS  (STANDING):  atorvastatin 10 milliGRAM(s) Oral at bedtime  chlorhexidine 2% Cloths 1 Application(s) Topical daily  cholecalciferol 1000 Unit(s) Oral daily  dextrose 5%. 1000 milliLiter(s) (50 mL/Hr) IV Continuous <Continuous>  dextrose 5%. 1000 milliLiter(s) (100 mL/Hr) IV Continuous <Continuous>  dextrose 50% Injectable 25 Gram(s) IV Push once  dextrose 50% Injectable 12.5 Gram(s) IV Push once  dextrose 50% Injectable 25 Gram(s) IV Push once  glucagon  Injectable 1 milliGRAM(s) IntraMuscular once  insulin lispro (ADMELOG) corrective regimen sliding scale   SubCutaneous three times a day before meals  insulin lispro (ADMELOG) corrective regimen sliding scale   SubCutaneous at bedtime  meropenem  IVPB 1000 milliGRAM(s) IV Intermittent every 12 hours  meropenem  IVPB      metoprolol succinate  milliGRAM(s) Oral daily  nystatin    Suspension 707952 Unit(s) Oral four times a day  pantoprazole  Injectable 40 milliGRAM(s) IV Push two times a day  polyethylene glycol 3350 17 Gram(s) Oral daily  predniSONE   Tablet 15 milliGRAM(s) Oral daily  senna 2 Tablet(s) Oral daily  sevelamer carbonate 800 milliGRAM(s) Oral every 8 hours  tacrolimus 1 milliGRAM(s) Oral two times a day  valGANciclovir 450 milliGRAM(s) Oral every 48 hours    MEDICATIONS  (PRN):  acetaminophen     Tablet .. 650 milliGRAM(s) Oral every 6 hours PRN Temp greater or equal to 38C (100.4F), Mild Pain (1 - 3)  dextrose Oral Gel 15 Gram(s) Oral once PRN Blood Glucose LESS THAN 70 milliGRAM(s)/deciliter  melatonin 3 milliGRAM(s) Oral at bedtime PRN Insomnia        02-22-25 @ 07:01  -  02-23-25 @ 07:00  --------------------------------------------------------  IN: 0 mL / OUT: 25 mL / NET: -25 mL        PHYSICAL EXAM:  Vital Signs Last 24 Hrs  T(C): 37.3 (23 Feb 2025 05:20), Max: 37.3 (23 Feb 2025 05:20)  T(F): 99.1 (23 Feb 2025 05:20), Max: 99.1 (23 Feb 2025 05:20)  HR: 75 (23 Feb 2025 07:02) (74 - 91)  BP: 99/67 (23 Feb 2025 07:02) (84/57 - 102/69)  BP(mean): --  RR: 20 (23 Feb 2025 05:20) (18 - 20)  SpO2: 97% (23 Feb 2025 05:20) (95% - 100%)    Parameters below as of 23 Feb 2025 05:20  Patient On (Oxygen Delivery Method): nasal cannula  O2 Flow (L/min): 2      CAPILLARY BLOOD GLUCOSE      POCT Blood Glucose.: 125 mg/dL (23 Feb 2025 06:16)  POCT Blood Glucose.: 132 mg/dL (23 Feb 2025 00:12)  POCT Blood Glucose.: 128 mg/dL (22 Feb 2025 20:58)  POCT Blood Glucose.: 115 mg/dL (22 Feb 2025 16:38)  POCT Blood Glucose.: 123 mg/dL (22 Feb 2025 11:36)    I&O's Summary    22 Feb 2025 07:01  -  23 Feb 2025 07:00  --------------------------------------------------------  IN: 0 mL / OUT: 25 mL / NET: -25 mL    Constitutional: NAD, comfortable in bed  HEENT: NC/AT, PERRLA, EOMI, no conjunctival pallor or scleral icterus, MMM  Neck: Supple, no JVD  Respiratory: CTA B/L. No w/r/r.   Cardiovascular: RRR, normal S1 and S2, no m/r/g.   Gastrointestinal: mildly distended, +BS, slight tenderness to palpation in the LUQ, LLQ, no guarding or rebound tenderness, no palpable masses  Extremities: wwp; no cyanosis, clubbing or edema. 2-3 bullae present on arms bilaterally.  Neurological: AAOx1 (self), no CN deficits, strength and sensation intact throughout.   Skin: 2-3 bullae present on arms bilaterally. Dry, flaking skin on lower extremities.      LABS:                        12.1   15.17 )-----------( 213      ( 22 Feb 2025 07:33 )             38.8     02-23    138  |  105  |  82[H]  ----------------------------<  70  4.4   |  11[L]  |  3.80[H]    Ca    7.1[L]      23 Feb 2025 07:08  Phos  5.6     02-23  Mg     1.6     02-23    TPro  2.7[L]  /  Alb  1.1[L]  /  TBili  0.2  /  DBili  x   /  AST  19  /  ALT  9[L]  /  AlkPhos  28[L]  02-23    PT/INR - ( 22 Feb 2025 04:17 )   PT: 15.9 sec;   INR: 1.39 ratio         PTT - ( 22 Feb 2025 04:17 )  PTT:35.6 sec      Urinalysis Basic - ( 23 Feb 2025 07:08 )    Color: x / Appearance: x / SG: x / pH: x  Gluc: 70 mg/dL / Ketone: x  / Bili: x / Urobili: x   Blood: x / Protein: x / Nitrite: x   Leuk Esterase: x / RBC: x / WBC x   Sq Epi: x / Non Sq Epi: x / Bacteria: x          IMAGING:    [X] All pertinent imaging reviewed by me SUBJECTIVE / OVERNIGHT EVENTS:  Pt seen and examined at bedside. Hypotensive overnight, 500CC LR bolus given with improvement in BP. AOx2 today, lethargic, no complaints.    MEDICATIONS  (STANDING):  atorvastatin 10 milliGRAM(s) Oral at bedtime  chlorhexidine 2% Cloths 1 Application(s) Topical daily  cholecalciferol 1000 Unit(s) Oral daily  dextrose 5%. 1000 milliLiter(s) (50 mL/Hr) IV Continuous <Continuous>  dextrose 5%. 1000 milliLiter(s) (100 mL/Hr) IV Continuous <Continuous>  dextrose 50% Injectable 25 Gram(s) IV Push once  dextrose 50% Injectable 12.5 Gram(s) IV Push once  dextrose 50% Injectable 25 Gram(s) IV Push once  glucagon  Injectable 1 milliGRAM(s) IntraMuscular once  insulin lispro (ADMELOG) corrective regimen sliding scale   SubCutaneous three times a day before meals  insulin lispro (ADMELOG) corrective regimen sliding scale   SubCutaneous at bedtime  meropenem  IVPB 1000 milliGRAM(s) IV Intermittent every 12 hours  meropenem  IVPB      metoprolol succinate  milliGRAM(s) Oral daily  nystatin    Suspension 575806 Unit(s) Oral four times a day  pantoprazole  Injectable 40 milliGRAM(s) IV Push two times a day  polyethylene glycol 3350 17 Gram(s) Oral daily  predniSONE   Tablet 15 milliGRAM(s) Oral daily  senna 2 Tablet(s) Oral daily  sevelamer carbonate 800 milliGRAM(s) Oral every 8 hours  tacrolimus 1 milliGRAM(s) Oral two times a day  valGANciclovir 450 milliGRAM(s) Oral every 48 hours    MEDICATIONS  (PRN):  acetaminophen     Tablet .. 650 milliGRAM(s) Oral every 6 hours PRN Temp greater or equal to 38C (100.4F), Mild Pain (1 - 3)  dextrose Oral Gel 15 Gram(s) Oral once PRN Blood Glucose LESS THAN 70 milliGRAM(s)/deciliter  melatonin 3 milliGRAM(s) Oral at bedtime PRN Insomnia        02-22-25 @ 07:01  -  02-23-25 @ 07:00  --------------------------------------------------------  IN: 0 mL / OUT: 25 mL / NET: -25 mL        PHYSICAL EXAM:  Vital Signs Last 24 Hrs  T(C): 37.3 (23 Feb 2025 05:20), Max: 37.3 (23 Feb 2025 05:20)  T(F): 99.1 (23 Feb 2025 05:20), Max: 99.1 (23 Feb 2025 05:20)  HR: 75 (23 Feb 2025 07:02) (74 - 91)  BP: 99/67 (23 Feb 2025 07:02) (84/57 - 102/69)  BP(mean): --  RR: 20 (23 Feb 2025 05:20) (18 - 20)  SpO2: 97% (23 Feb 2025 05:20) (95% - 100%)    Parameters below as of 23 Feb 2025 05:20  Patient On (Oxygen Delivery Method): nasal cannula  O2 Flow (L/min): 2      CAPILLARY BLOOD GLUCOSE      POCT Blood Glucose.: 125 mg/dL (23 Feb 2025 06:16)  POCT Blood Glucose.: 132 mg/dL (23 Feb 2025 00:12)  POCT Blood Glucose.: 128 mg/dL (22 Feb 2025 20:58)  POCT Blood Glucose.: 115 mg/dL (22 Feb 2025 16:38)  POCT Blood Glucose.: 123 mg/dL (22 Feb 2025 11:36)    I&O's Summary    22 Feb 2025 07:01  -  23 Feb 2025 07:00  --------------------------------------------------------  IN: 0 mL / OUT: 25 mL / NET: -25 mL    Constitutional: lethargic, laying in bed, arousable  HEENT: NC/AT, PERRLA, EOMI, no conjunctival pallor or scleral icterus, MMM  Neck: Supple, no JVD  Respiratory: CTA B/L. No w/r/r.   Cardiovascular: RRR, normal S1 and S2, no m/r/g.   Gastrointestinal: mildly distended, +BS, slight tenderness to palpation in the LUQ, LLQ, no guarding or rebound tenderness, no palpable masses  Extremities: wwp; no cyanosis, clubbing or edema. 2-3 bullae present on arms bilaterally.  Neurological: AAOx2 (self, place), no CN deficits, strength and sensation intact throughout.   Skin: 2-3 bullae present on arms bilaterally. Dry, flaking skin on lower extremities.      LABS:                        12.1   15.17 )-----------( 213      ( 22 Feb 2025 07:33 )             38.8     02-23    138  |  105  |  82[H]  ----------------------------<  70  4.4   |  11[L]  |  3.80[H]    Ca    7.1[L]      23 Feb 2025 07:08  Phos  5.6     02-23  Mg     1.6     02-23    TPro  2.7[L]  /  Alb  1.1[L]  /  TBili  0.2  /  DBili  x   /  AST  19  /  ALT  9[L]  /  AlkPhos  28[L]  02-23    PT/INR - ( 22 Feb 2025 04:17 )   PT: 15.9 sec;   INR: 1.39 ratio         PTT - ( 22 Feb 2025 04:17 )  PTT:35.6 sec      Urinalysis Basic - ( 23 Feb 2025 07:08 )    Color: x / Appearance: x / SG: x / pH: x  Gluc: 70 mg/dL / Ketone: x  / Bili: x / Urobili: x   Blood: x / Protein: x / Nitrite: x   Leuk Esterase: x / RBC: x / WBC x   Sq Epi: x / Non Sq Epi: x / Bacteria: x          IMAGING:    [X] All pertinent imaging reviewed by me

## 2025-02-23 NOTE — PROGRESS NOTE ADULT - PROBLEM SELECTOR PLAN 8
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 LUE now swollen. + Pulses, and no motor deficits - lower concern for compartment syndrome  - RUE Duplex without thrombus     > c/w derm recs regarding bilateral bullae on upper extremities (Likely edema bullae; please see Derm note)  > Elevate R arm, warm compresses and arm precautions

## 2025-02-23 NOTE — PROVIDER CONTACT NOTE (OTHER) - ASSESSMENT
right arm swelling from iv infiltration, 2 closed blister and 1 open blister around the right upper arm ivl
pt aox1, with some lethargy noted. no s/s of distress no vomiting noted, 02 sat within normal limits.
a&ox0-1
Pt AAOx1. VSS on RA. Pt denies CP, SOB. Pt asymptomatic.
[id number] alert but confused

## 2025-02-23 NOTE — CHART NOTE - NSCHARTNOTEFT_GEN_A_CORE
MICU ACCEPT NOTE    CHIEF COMPLAINT: Patient is a 74y old  Male who presents with a chief complaint of Lethargy (23 Feb 2025 12:19)      HPI:  73yo M w/ pmhx HTN, HLD, nonischemic cardiomyopathy, chronic systolic heart failure s/p HM2 LVAD (6/2017), s/p heart transplant from Hep. 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 On the phone, the patient's godbrother mentioned that on 2/18, the patient was in the car with his godbrother and was disoriented asking to get off on the road 4 blocks before his house. In addition, a caretaker stated that when she spoke to Mr. Hameed on the phone at 11am on 2/18, he was doing well. However, when the caretaker visited 2 hours later at 1pm, the patient was disoriented and felt his legs were heavy. This prompted the patient to come to the hospital.     In the ED: Hypothermic 91.4, HR 80, /60. Pt noted to be Hyperkalemic (6.2) with Mixed respiratory and metabolic acidosis pH 7.2. Pt was given Calcium Gluconate 2g, D50/Insulin 5 unit, 40 mg IV lasix, Lokelma 10mg. Vancomycin and Zosyn. Accepted to MICU for CRRT.        PAST MEDICAL & SURGICAL HISTORY:  HTN      SVT (Supraventricular Tachycardia)      Non-Ischemic Cardiomyopathy  now s/p transplant 2018      GIB (gastrointestinal bleeding)      Hepatitis C virus      DVT of upper extremity (deep vein thrombosis)      Former smoker      HLD (hyperlipidemia)      Knee pain, right      H/O autoimmune hemolytic anemia      H/O hemolytic anemia      Atrial fibrillation      Status post left hip replacement      H/O heart transplant  2/2018          FAMILY HISTORY:  No pertinent family history in first degree relatives        SOCIAL HISTORY:  Lives with godbrother. Ambulates with Cane.    MEDICATIONS  (STANDING):  atorvastatin 10 milliGRAM(s) Oral at bedtime  chlorhexidine 2% Cloths 1 Application(s) Topical daily  cholecalciferol 1000 Unit(s) Oral daily  dextrose 5%. 1000 milliLiter(s) (100 mL/Hr) IV Continuous <Continuous>  dextrose 5%. 1000 milliLiter(s) (50 mL/Hr) IV Continuous <Continuous>  dextrose 50% Injectable 25 Gram(s) IV Push once  dextrose 50% Injectable 12.5 Gram(s) IV Push once  dextrose 50% Injectable 25 Gram(s) IV Push once  glucagon  Injectable 1 milliGRAM(s) IntraMuscular once  insulin lispro (ADMELOG) corrective regimen sliding scale   SubCutaneous three times a day before meals  insulin lispro (ADMELOG) corrective regimen sliding scale   SubCutaneous at bedtime  meropenem  IVPB 1000 milliGRAM(s) IV Intermittent every 12 hours  meropenem  IVPB      metoprolol succinate  milliGRAM(s) Oral daily  midodrine 5 milliGRAM(s) Oral every 8 hours  nystatin    Suspension 514348 Unit(s) Oral four times a day  pantoprazole  Injectable 40 milliGRAM(s) IV Push two times a day  polyethylene glycol 3350 17 Gram(s) Oral daily  predniSONE   Tablet 15 milliGRAM(s) Oral daily  senna 2 Tablet(s) Oral daily  sevelamer carbonate 1600 milliGRAM(s) Oral every 8 hours  tacrolimus 1 milliGRAM(s) Oral two times a day  valGANciclovir 450 milliGRAM(s) Oral every 48 hours    MEDICATIONS  (PRN):  dextrose Oral Gel 15 Gram(s) Oral once PRN Blood Glucose LESS THAN 70 milliGRAM(s)/deciliter      Allergies    No Known Allergies    Intolerances        REVIEW OF SYSTEMS:  CONSTITUTIONAL: No weakness, fevers or chills  EYES/ENT: No visual changes;  No vertigo or throat pain   NECK: No pain or stiffness  RESPIRATORY: No cough, wheezing, hemoptysis; No shortness of breath  CARDIOVASCULAR: No chest pain or palpitations  GASTROINTESTINAL: No abdominal or epigastric pain. No nausea, vomiting, or hematemesis; No diarrhea or constipation. No melena or hematochezia.  GENITOURINARY: No dysuria, frequency or hematuria  NEUROLOGICAL: No numbness or weakness  SKIN: No itching, rashes  [ ] All other systems negative  [ ] Unable to assess ROS because ________    OBJECTIVE:  ICU Vital Signs Last 24 Hrs  T(C): 33.3 (23 Feb 2025 12:01), Max: 37.3 (23 Feb 2025 05:20)  T(F): 92 (23 Feb 2025 12:01), Max: 99.1 (23 Feb 2025 05:20)  HR: 73 (23 Feb 2025 10:48) (73 - 91)  BP: 93/62 (23 Feb 2025 10:48) (84/57 - 102/69)  BP(mean): --  ABP: --  ABP(mean): --  RR: 18 (23 Feb 2025 10:48) (18 - 20)  SpO2: 97% (23 Feb 2025 10:48) (95% - 100%)    O2 Parameters below as of 23 Feb 2025 10:48  Patient On (Oxygen Delivery Method): room air              02-22 @ 07:01  -  02-23 @ 07:00  --------------------------------------------------------  IN: 0 mL / OUT: 25 mL / NET: -25 mL      CAPILLARY BLOOD GLUCOSE      POCT Blood Glucose.: 135 mg/dL (23 Feb 2025 11:31)      PHYSICAL EXAM:  Constitutional: lethargic, laying in bed, arousable  HEENT: NC/AT, PERRLA, EOMI, no conjunctival pallor or scleral icterus, MMM  Neck: Supple, no JVD  Respiratory: CTA B/L. No w/r/r.   Cardiovascular: RRR, normal S1 and S2, no m/r/g.   Gastrointestinal: mildly distended, +BS, slight tenderness to palpation in the LUQ, LLQ, no guarding or rebound tenderness, no palpable masses  Extremities: wwp; no cyanosis, clubbing or edema. 2-3 bullae present on arms bilaterally.  Neurological: AAOx2 (self, place), no CN deficits, strength and sensation intact throughout.   Skin: 2-3 bullae present on arms bilaterally. Dry, flaking skin on lower extremities.      LABS:                        11.2   11.87 )-----------( 209      ( 23 Feb 2025 09:51 )             36.4     Hgb Trend: 11.2<--, 5.9<--, 12.1<--, 12.9<--, 12.5<--  02-23    140  |  101  |  106[H]  ----------------------------<  114[H]  4.4   |  20[L]  |  6.64[H]    Ca    8.5      23 Feb 2025 09:51  Phos  10.7     02-23  Mg     2.9     02-23    TPro  5.5[L]  /  Alb  2.5[L]  /  TBili  0.4  /  DBili  x   /  AST  43[H]  /  ALT  20  /  AlkPhos  60  02-23    Creatinine Trend: 6.64<--, 3.80<--, 4.65<--, 4.34<--, 2.75<--, 2.49<--  PT/INR - ( 22 Feb 2025 04:17 )   PT: 15.9 sec;   INR: 1.39 ratio         PTT - ( 22 Feb 2025 04:17 )  PTT:35.6 sec  Urinalysis Basic - ( 23 Feb 2025 09:51 )    Color: x / Appearance: x / SG: x / pH: x  Gluc: 114 mg/dL / Ketone: x  / Bili: x / Urobili: x   Blood: x / Protein: x / Nitrite: x   Leuk Esterase: x / RBC: x / WBC x   Sq Epi: x / Non Sq Epi: x / Bacteria: x        Venous Blood Gas:  02-23 @ 12:58  7.17/57/58/21/87.4  VBG Lactate: 1.0  Venous Blood Gas:  02-22 @ 03:12  7.24/58/25/25/30.9  VBG Lactate: 1.6          ====================ASSESSMENT======================  73yo M w/ pmhx HTN, HLD, nonischemic cardiomyopathy, chronic systolic heart failure s/p HM2 LVAD (6/2017), s/p heart transplant from Hep. 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 On the phone, the patient's godbrother mentioned that on 2/18, the patient was in the car with his godbrother and was disoriented asking to get off on the road 4 blocks before his house. In addition, a caretaker stated that when she spoke to Mr. Hameed on the phone at 11am on 2/18, he was doing well. However, when the caretaker visited 2 hours later at 1pm, the patient was disoriented and felt his legs were heavy. This prompted the patient to come to the hospital.     In the ED: Hypothermic 91.4, HR 80, /60. Pt noted to be Hyperkalemic (6.2) with Mixed respiratory and metabolic acidosis pH 7.2. Pt was given Calcium Gluconate 2g, D50/Insulin 5 unit, 40 mg IV lasix, Lokelma 10mg. Vancomycin and Zosyn. Accepted to MICU for CRRT.    ====================NEUROLOGY=======================  # Metabolic encephalopathy.   - Patient baseline AOx3. AOx1 at ED arrival, iso of infection vs metabolic derangements (uremic encephelopathy). Currently AOx3 2/23 but lethargic.   - CTH and angio w/o hemorrhage or stenosis   - TSH wnl / B12, CK 2200  - Fall precautions and dysphagia screen  - spot EEG: negative for seizures     Plan  - Neurology recs appreciated   - Pending Infectious workup and possible LP 2/24.      ====================RESPIRATORY======================    ====================CARDIOVASCULAR==================  # H/O heart transplant.   - Nonischemic cardiomyopathy, chronic systolic heart failure s/p HM2 LVAD (6/2017), s/p heart transplant from Hep. C donor (treated) 2/23/18   - Home Tacrolimus dose 2mg BID, Pred 15mg qd  - Intolerant of Cellcept due to leukopenia.    Plan  - Continue with home Pred 15mg qd  - Hold home Bactrim given hyperkalemia - can consider Atovaquone  - Holding ASA for now; if no more GIB and HgB stable, can re-start  - c/w home Atorvastatin (therapeutic interchange)   - HOLD home Metoprolol from 200mg with hold parameters due to hypotension (02/23)  - Tacro level daily : home regimen 2mg BID, c/w 1mg BID    # Paroxysmal atrial fibrillation.   - Home regimen : Eliquis 5 mg PO BID at home for hx of of afib and IJ thrombi  - EKG on admission - nsr   - [id number]  = 3  - Last Dose : 2/20 AM     Plan  - currently Eliquis holding in anticipation for LP 2/24.    # Hyptension (chronic)    Plan  - continue home Toprol  mg PO QD  - Holding home Losartan 75 mg PO QD due to NORMAN & hypotension.    # Vascular disorder of lower extremity.   - Extremities appear cool and dry     Plan  - c/w Local wound care by podiatry   - Podiatry recommendations - pending SHANEKA studies, consider vascular evaluation if abnormal.      ====================/RENAL========================  NORMAN on CKD.   - Known hx of CKD 3, Cr 2.4 uptrending  - Scr ranged from 1.3-2.1 in 2023. Most recent Scr was 1.75 on 1/30/24. On admission, Scr was 1.9 and is worsening now. UA showed trace ketones.   - No improvement with IVF, likely ATN i/s/o contrast/medications.     Plan  - IR consult for cath placement for possible HD per nephrology team as the patient has worsening kidney function  - Hold home ARB for now   - Euvolemic - will hold home Bumex 0.5mg  - c/w renvela for hyperphosphatemia.    #Hyperkalemia (resolved)   - K 6.2 at ED arrival  - shifted in the ED, no EKG changes consistent with severe hyper K. Cr not significantly elevated form baseline, c/f RTA from bactrim and Tacro. Stable   - s/p Lokelma 10mg BID for 3 days    Plan  - Hold home ARB  - f/u Tacro level daily   - Hold home bactrim - ID consult regarding Atovaquone   - c/w monitoring patient on Tele.    ====================GI/NUTRITION=====================    # Coffee ground emesis  - 1 episode of coffee ground emesis overnight  - unclear if true GIB  - Hb stable   - CTAP (2/22) ileus vs partial SBO - cannot r/o GI bleed    Plan  - monitor for further episodes  - c/w PPI IV 40 BID   - c/w trending CBC Q12H  - Holding DVT Ppx/ASA for now; if no more GIB and HgB stable, can re-start  - if repeat episode, GI consult   - If repeat episode or worsening lactate, CT angiogram with venous phase.      ====================SKIN=============================  #Swollen arm   - RUE swollen with hx of IJ thrombus, possible infiltrated IV. LUE now swollen. + Pulses, and no motor deficits   - lower concern for compartment syndrome  - RUE Duplex without thrombus     Plan  - c/w derm recs regarding bilateral bullae on upper extremities (Likely edema bullae; please see Derm note  - Elevate R arm, warm compresses and arm precautions.      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 No manifest signs of bowel ischemia.      Interval worsening of right lower lobe and right middle lobe atelectasis secondary to elevated right hemidiaphragm when compared to prior CT dated 7/11/2023. Superimposed infection in the collapsed lungs is not excluded      Plan  - Midodrine 5mg TID (2/23-  - c/w Meropenem (2/21- ), Hold off additional doses of Vancomycin (MRSA swab negative)  - c/w home Valganciclovir for ppx renally dosed   - Consider anti-fungal coverage as patient is immunocompromised   - f/u infectious labs - Toxoplasma, EBV, Fungitell, Galactomannan, CMV, Cryptococcus, MRSA, ASCENCION virus, CMV, Crypto, ASCENCION virus  - f/u GI PCR, consider C. Diff testing   - Transplant ID recommendations   - LP pending Monday 2/24.    ====================ENDOCRINE=======================  # Diabetes type 2.   - A1c 5.8    Plan  - ISS.    ====================HEMATOLOGIC/DVT PPx=============  # Hemolytic anemia.   - hx of hemolytic anemia, follows with Dr. Rosario as outpatient    Plan  - c/w Prednisone 15 mg PO QD   - f/u hematology/oncology recs  - Monitor H&H daily.      ====================ETHICS===========================  full code MICU ACCEPT NOTE    CHIEF COMPLAINT: Patient is a 74y old  Male who presents with a chief complaint of Lethargy (23 Feb 2025 12:19)      HPI:  75yo M w/ pmhx HTN, HLD, nonischemic cardiomyopathy, chronic systolic heart failure s/p HM2 LVAD (6/2017), s/p heart transplant from Hep. 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 On the phone, the patient's godbrother mentioned that on 2/18, the patient was in the car with his godbrother and was disoriented asking to get off on the road 4 blocks before his house. In addition, a caretaker stated that when she spoke to Mr. Hameed on the phone at 11am on 2/18, he was doing well. However, when the caretaker visited 2 hours later at 1pm, the patient was disoriented and felt his legs were heavy. This prompted the patient to come to the hospital.     In the ED: Hypothermic 91.4, HR 80, /60. Pt noted to be Hyperkalemic (6.2) with Mixed respiratory and metabolic acidosis pH 7.2. Pt was given Calcium Gluconate 2g, D50/Insulin 5 unit, 40 mg IV lasix, Lokelma 10mg. Vancomycin and Zosyn. Accepted to MICU for CRRT.        PAST MEDICAL & SURGICAL HISTORY:  HTN      SVT (Supraventricular Tachycardia)      Non-Ischemic Cardiomyopathy  now s/p transplant 2018      GIB (gastrointestinal bleeding)      Hepatitis C virus      DVT of upper extremity (deep vein thrombosis)      Former smoker      HLD (hyperlipidemia)      Knee pain, right      H/O autoimmune hemolytic anemia      H/O hemolytic anemia      Atrial fibrillation      Status post left hip replacement      H/O heart transplant  2/2018          FAMILY HISTORY:  No pertinent family history in first degree relatives        SOCIAL HISTORY:  Lives with godbrother. Ambulates with Cane.    MEDICATIONS  (STANDING):  atorvastatin 10 milliGRAM(s) Oral at bedtime  chlorhexidine 2% Cloths 1 Application(s) Topical daily  cholecalciferol 1000 Unit(s) Oral daily  dextrose 5%. 1000 milliLiter(s) (100 mL/Hr) IV Continuous <Continuous>  dextrose 5%. 1000 milliLiter(s) (50 mL/Hr) IV Continuous <Continuous>  dextrose 50% Injectable 25 Gram(s) IV Push once  dextrose 50% Injectable 12.5 Gram(s) IV Push once  dextrose 50% Injectable 25 Gram(s) IV Push once  glucagon  Injectable 1 milliGRAM(s) IntraMuscular once  insulin lispro (ADMELOG) corrective regimen sliding scale   SubCutaneous three times a day before meals  insulin lispro (ADMELOG) corrective regimen sliding scale   SubCutaneous at bedtime  meropenem  IVPB 1000 milliGRAM(s) IV Intermittent every 12 hours  meropenem  IVPB      metoprolol succinate  milliGRAM(s) Oral daily  midodrine 5 milliGRAM(s) Oral every 8 hours  nystatin    Suspension [id number] Unit(s) Oral four times a day  pantoprazole  Injectable 40 milliGRAM(s) IV Push two times a day  polyethylene glycol 3350 17 Gram(s) Oral daily  predniSONE   Tablet 15 milliGRAM(s) Oral daily  senna 2 Tablet(s) Oral daily  sevelamer carbonate 1600 milliGRAM(s) Oral every 8 hours  tacrolimus 1 milliGRAM(s) Oral two times a day  valGANciclovir 450 milliGRAM(s) Oral every 48 hours    MEDICATIONS  (PRN):  dextrose Oral Gel 15 Gram(s) Oral once PRN Blood Glucose LESS THAN 70 milliGRAM(s)/deciliter      Allergies    No Known Allergies    Intolerances        REVIEW OF SYSTEMS:  CONSTITUTIONAL: No weakness, fevers or chills  EYES/ENT: No visual changes;  No vertigo or throat pain   NECK: No pain or stiffness  RESPIRATORY: No cough, wheezing, hemoptysis; No shortness of breath  CARDIOVASCULAR: No chest pain or palpitations  GASTROINTESTINAL: No abdominal or epigastric pain. No nausea, vomiting, or hematemesis; No diarrhea or constipation. No melena or hematochezia.  GENITOURINARY: No dysuria, frequency or hematuria  NEUROLOGICAL: No numbness or weakness  SKIN: No itching, rashes  [ ] All other systems negative  [ ] Unable to assess ROS because ________    OBJECTIVE:  ICU Vital Signs Last 24 Hrs  T(C): 33.3 (23 Feb 2025 12:01), Max: 37.3 (23 Feb 2025 05:20)  T(F): 92 (23 Feb 2025 12:01), Max: 99.1 (23 Feb 2025 05:20)  HR: 73 (23 Feb 2025 10:48) (73 - 91)  BP: 93/62 (23 Feb 2025 10:48) (84/57 - 102/69)  BP(mean): --  ABP: --  ABP(mean): --  RR: 18 (23 Feb 2025 10:48) (18 - 20)  SpO2: 97% (23 Feb 2025 10:48) (95% - 100%)    O2 Parameters below as of 23 Feb 2025 10:48  Patient On (Oxygen Delivery Method): room air              02-22 @ 07:01  -  02-23 @ 07:00  --------------------------------------------------------  IN: 0 mL / OUT: 25 mL / NET: -25 mL      CAPILLARY BLOOD GLUCOSE      POCT Blood Glucose.: 135 mg/dL (23 Feb 2025 11:31)      PHYSICAL EXAM:  Constitutional: lethargic, laying in bed, arousable  HEENT: NC/AT, PERRLA, EOMI, no conjunctival pallor or scleral icterus, MMM  Neck: Supple, no JVD  Respiratory: CTA B/L. No w/r/r.   Cardiovascular: RRR, normal S1 and S2, no m/r/g.   Gastrointestinal: mildly distended, +BS, slight tenderness to palpation in the LUQ, LLQ, no guarding or rebound tenderness, no palpable masses  Extremities: wwp; no cyanosis, clubbing or edema. 2-3 bullae present on arms bilaterally.  Neurological: AAOx2 (self, place), no CN deficits, strength and sensation intact throughout.   Skin: 2-3 bullae present on arms bilaterally. Dry, flaking skin on lower extremities.      LABS:                        11.2   11.87 )-----------( 209      ( 23 Feb 2025 09:51 )             36.4     Hgb Trend: 11.2<--, 5.9<--, 12.1<--, 12.9<--, 12.5<--  02-23    140  |  101  |  106[H]  ----------------------------<  114[H]  4.4   |  20[L]  |  6.64[H]    Ca    8.5      23 Feb 2025 09:51  Phos  10.7     02-23  Mg     2.9     02-23    TPro  5.5[L]  /  Alb  2.5[L]  /  TBili  0.4  /  DBili  x   /  AST  43[H]  /  ALT  20  /  AlkPhos  60  02-23    Creatinine Trend: 6.64<--, 3.80<--, 4.65<--, 4.34<--, 2.75<--, 2.49<--  PT/INR - ( 22 Feb 2025 04:17 )   PT: 15.9 sec;   INR: 1.39 ratio         PTT - ( 22 Feb 2025 04:17 )  PTT:35.6 sec  Urinalysis Basic - ( 23 Feb 2025 09:51 )    Color: x / Appearance: x / SG: x / pH: x  Gluc: 114 mg/dL / Ketone: x  / Bili: x / Urobili: x   Blood: x / Protein: x / Nitrite: x   Leuk Esterase: x / RBC: x / WBC x   Sq Epi: x / Non Sq Epi: x / Bacteria: x        Venous Blood Gas:  02-23 @ 12:58  7.17/57/58/21/87.4  VBG Lactate: 1.0  Venous Blood Gas:  02-22 @ 03:12  7.24/58/25/25/30.9  VBG Lactate: 1.6          ====================ASSESSMENT======================  75yo M w/ pmhx HTN, HLD, nonischemic cardiomyopathy, chronic systolic heart failure s/p HM2 LVAD (6/2017), s/p heart transplant from Hep. 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 Found to have septic shock, w/ pseudo in BCx. Worsening NORMAN.  Accepted to MICU for CRRT.    ====================NEUROLOGY=======================  # Metabolic encephalopathy.   - Patient baseline AOx3. AOx1 at ED arrival, iso of infection vs metabolic derangements (uremic encephelopathy). Currently AOx3 2/23 but lethargic.   - CTH and angio w/o hemorrhage or stenosis   - TSH wnl / B12, CK 2200  - Fall precautions and dysphagia screen  - spot EEG: negative for seizures     Plan  - Neurology recs appreciated   - Pending Infectious workup and possible LP 2/24.      ====================RESPIRATORY======================  No acute issue.      ====================CARDIOVASCULAR==================  # H/O heart transplant.   - Nonischemic cardiomyopathy, chronic systolic heart failure s/p HM2 LVAD (6/2017), s/p heart transplant from Hep. C donor (treated) 2/23/18   - Home Tacrolimus dose 2mg BID, Pred 15mg qd  - Intolerant of Cellcept due to leukopenia.    Plan  - Continue with home Pred 15mg qd  - Hold home Bactrim given hyperkalemia - can consider Atovaquone  - Holding ASA for now; if no more GIB and HgB stable, can re-start  - c/w home Atorvastatin (therapeutic interchange)   - HOLD home Metoprolol from 200mg with hold parameters due to hypotension (02/23)  - Tacro level daily : home regimen 2mg BID, c/w 1mg BID    # Paroxysmal atrial fibrillation.   - Home regimen : Eliquis 5 mg PO BID at home for hx of of afib and IJ thrombi  - EKG on admission - nsr   - [id number]  = 3  - Last Dose : 2/20 AM     Plan  - currently Eliquis holding in anticipation for LP 2/24.    # Hypertension (chronic)    Plan  - continue home Toprol  mg PO QD  - Holding home Losartan 75 mg PO QD due to NORMAN & hypotension.    # Vascular disorder of lower extremity.   - Extremities appear cool and dry     Plan  - c/w Local wound care by podiatry   - Podiatry recommendations - pending SHANEKA studies, consider vascular evaluation if abnormal.      ====================/RENAL========================  NORMAN on CKD.   - Known hx of CKD 3, Cr 2.4 uptrending  - Scr ranged from 1.3-2.1 in 2023. Most recent Scr was 1.75 on 1/30/24. On admission, Scr was 1.9 and is worsening now. UA showed trace ketones.   - No improvement with IVF, likely ATN i/s/o contrast/medications.     Plan  - IR consult for cath placement for possible HD per nephrology team as the patient has worsening kidney function  - Hold home ARB for now   - Euvolemic - will hold home Bumex 0.5mg  - c/w renvela for hyperphosphatemia.    #Hyperkalemia (resolved)   - K 6.2 at ED arrival  - shifted in the ED, no EKG changes consistent with severe hyper K. Cr not significantly elevated form baseline, c/f RTA from bactrim and Tacro. Stable   - s/p Lokelma 10mg BID for 3 days    Plan  - Hold home ARB  - f/u Tacro level daily   - Hold home bactrim - ID consult regarding Atovaquone   - c/w monitoring patient on Tele.      ====================GI/NUTRITION=====================    # Coffee ground emesis  - 1 episode of coffee ground emesis overnight  - unclear if true GIB  - Hb stable   - CTAP (2/22) ileus vs partial SBO - cannot r/o GI bleed    Plan  - monitor for further episodes  - c/w PPI IV 40 BID   - c/w trending CBC Q12H  - Holding DVT Ppx/ASA for now; if no more GIB and HgB stable, can re-start  - if repeat episode, GI consult   - If repeat episode or worsening lactate, CT angiogram with venous phase.      ====================SKIN=============================  #Swollen arm   - RUE swollen with hx of IJ thrombus, possible infiltrated IV. LUE now swollen. + Pulses, and no motor deficits   - lower concern for compartment syndrome  - RUE Duplex without thrombus     Plan  - c/w derm recs regarding bilateral bullae on upper extremities (Likely edema bullae; please see Derm note  - Elevate R arm, warm compresses and arm precautions.      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 No manifest signs of bowel ischemia.      Interval worsening of right lower lobe and right middle lobe atelectasis secondary to elevated right hemidiaphragm when compared to prior CT dated 7/11/2023. Superimposed infection in the collapsed lungs is not excluded    Plan  - Midodrine 5mg TID (2/23-  - c/w Meropenem (2/21- ), Hold off additional doses of Vancomycin (MRSA swab negative)  - c/w home Valganciclovir for ppx renally dosed   - Consider anti-fungal coverage as patient is immunocompromised   - f/u infectious labs - Toxoplasma, EBV, Fungitell, Galactomannan, CMV, Cryptococcus, MRSA, ASCENCION virus, CMV, Crypto, ASCENCION virus  - f/u GI PCR, consider C. Diff testing   - Transplant ID recommendations   - LP pending Monday 2/24.    ====================ENDOCRINE=======================  # Diabetes type 2.   - A1c 5.8    Plan  - ISS.    ====================HEMATOLOGIC/DVT PPx=============  # Hemolytic anemia.   - hx of hemolytic anemia, follows with Dr. Rosario as outpatient    Plan  - c/w Prednisone 15 mg PO QD   - f/u hematology/oncology recs  - Monitor H&H daily.      ====================ETHICS===========================  full code MICU ACCEPT NOTE    CHIEF COMPLAINT: Patient is a 74y old  Male who presents with a chief complaint of Lethargy (23 Feb 2025 12:19)      HPI:  75yo M w/ pmhx HTN, HLD, nonischemic cardiomyopathy, chronic systolic heart failure s/p HM2 LVAD (6/2017), s/p heart transplant from Hep. 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 On the phone, the patient's godbrother mentioned that on 2/18, the patient was in the car with his godbrother and was disoriented asking to get off on the road 4 blocks before his house. In addition, a caretaker stated that when she spoke to Mr. Hameed on the phone at 11am on 2/18, he was doing well. However, when the caretaker visited 2 hours later at 1pm, the patient was disoriented and felt his legs were heavy. This prompted the patient to come to the hospital.     In the ED: Hypothermic 91.4, HR 80, /60. Pt noted to be Hyperkalemic (6.2) with Mixed respiratory and metabolic acidosis pH 7.2. Pt was given Calcium Gluconate 2g, D50/Insulin 5 unit, 40 mg IV lasix, Lokelma 10mg. Vancomycin and Zosyn.     On the floor, pt was receiving midodrine and abx for septic shock. NORMAN worsened. Accepted to MICU for CRRT. Accepted to MICU for CRRT.        PAST MEDICAL & SURGICAL HISTORY:  HTN      SVT (Supraventricular Tachycardia)      Non-Ischemic Cardiomyopathy  now s/p transplant 2018      GIB (gastrointestinal bleeding)      Hepatitis C virus      DVT of upper extremity (deep vein thrombosis)      Former smoker      HLD (hyperlipidemia)      Knee pain, right      H/O autoimmune hemolytic anemia      H/O hemolytic anemia      Atrial fibrillation      Status post left hip replacement      H/O heart transplant  2/2018          FAMILY HISTORY:  No pertinent family history in first degree relatives        SOCIAL HISTORY:  Lives with godbrother. Ambulates with Cane.    MEDICATIONS  (STANDING):  atorvastatin 10 milliGRAM(s) Oral at bedtime  chlorhexidine 2% Cloths 1 Application(s) Topical daily  cholecalciferol 1000 Unit(s) Oral daily  dextrose 5%. 1000 milliLiter(s) (100 mL/Hr) IV Continuous <Continuous>  dextrose 5%. 1000 milliLiter(s) (50 mL/Hr) IV Continuous <Continuous>  dextrose 50% Injectable 25 Gram(s) IV Push once  dextrose 50% Injectable 12.5 Gram(s) IV Push once  dextrose 50% Injectable 25 Gram(s) IV Push once  glucagon  Injectable 1 milliGRAM(s) IntraMuscular once  insulin lispro (ADMELOG) corrective regimen sliding scale   SubCutaneous three times a day before meals  insulin lispro (ADMELOG) corrective regimen sliding scale   SubCutaneous at bedtime  meropenem  IVPB 1000 milliGRAM(s) IV Intermittent every 12 hours  meropenem  IVPB      metoprolol succinate  milliGRAM(s) Oral daily  midodrine 5 milliGRAM(s) Oral every 8 hours  nystatin    Suspension 658666 Unit(s) Oral four times a day  pantoprazole  Injectable 40 milliGRAM(s) IV Push two times a day  polyethylene glycol 3350 17 Gram(s) Oral daily  predniSONE   Tablet 15 milliGRAM(s) Oral daily  senna 2 Tablet(s) Oral daily  sevelamer carbonate 1600 milliGRAM(s) Oral every 8 hours  tacrolimus 1 milliGRAM(s) Oral two times a day  valGANciclovir 450 milliGRAM(s) Oral every 48 hours    MEDICATIONS  (PRN):  dextrose Oral Gel 15 Gram(s) Oral once PRN Blood Glucose LESS THAN 70 milliGRAM(s)/deciliter      Allergies    No Known Allergies    Intolerances        REVIEW OF SYSTEMS:  CONSTITUTIONAL: No weakness, fevers or chills  EYES/ENT: No visual changes;  No vertigo or throat pain   NECK: No pain or stiffness  RESPIRATORY: No cough, wheezing, hemoptysis; No shortness of breath  CARDIOVASCULAR: No chest pain or palpitations  GASTROINTESTINAL: No abdominal or epigastric pain. No nausea, vomiting, or hematemesis; No diarrhea or constipation. No melena or hematochezia.  GENITOURINARY: No dysuria, frequency or hematuria  NEUROLOGICAL: No numbness or weakness  SKIN: No itching, rashes  [ ] All other systems negative  [ ] Unable to assess ROS because ________    OBJECTIVE:  ICU Vital Signs Last 24 Hrs  T(C): 33.3 (23 Feb 2025 12:01), Max: 37.3 (23 Feb 2025 05:20)  T(F): 92 (23 Feb 2025 12:01), Max: 99.1 (23 Feb 2025 05:20)  HR: 73 (23 Feb 2025 10:48) (73 - 91)  BP: 93/62 (23 Feb 2025 10:48) (84/57 - 102/69)  BP(mean): --  ABP: --  ABP(mean): --  RR: 18 (23 Feb 2025 10:48) (18 - 20)  SpO2: 97% (23 Feb 2025 10:48) (95% - 100%)    O2 Parameters below as of 23 Feb 2025 10:48  Patient On (Oxygen Delivery Method): room air              02-22 @ 07:01  -  02-23 @ 07:00  --------------------------------------------------------  IN: 0 mL / OUT: 25 mL / NET: -25 mL      CAPILLARY BLOOD GLUCOSE      POCT Blood Glucose.: 135 mg/dL (23 Feb 2025 11:31)      PHYSICAL EXAM:  Constitutional: lethargic, laying in bed, arousable  HEENT: NC/AT, PERRLA, EOMI, no conjunctival pallor or scleral icterus, MMM  Neck: Supple, no JVD  Respiratory: CTA B/L. No w/r/r.   Cardiovascular: RRR, normal S1 and S2, no m/r/g.   Gastrointestinal: mildly distended, +BS, slight tenderness to palpation in the LUQ, LLQ, no guarding or rebound tenderness, no palpable masses  Extremities: wwp; no cyanosis, clubbing or edema. 2-3 bullae present on arms bilaterally.  Neurological: AAOx2 (self, place), no CN deficits, strength and sensation intact throughout.   Skin: 2-3 bullae present on arms bilaterally. Dry, flaking skin on lower extremities.      LABS:                        11.2   11.87 )-----------( 209      ( 23 Feb 2025 09:51 )             36.4     Hgb Trend: 11.2<--, 5.9<--, 12.1<--, 12.9<--, 12.5<--  02-23    140  |  101  |  106[H]  ----------------------------<  114[H]  4.4   |  20[L]  |  6.64[H]    Ca    8.5      23 Feb 2025 09:51  Phos  10.7     02-23  Mg     2.9     02-23    TPro  5.5[L]  /  Alb  2.5[L]  /  TBili  0.4  /  DBili  x   /  AST  43[H]  /  ALT  20  /  AlkPhos  60  02-23    Creatinine Trend: 6.64<--, 3.80<--, 4.65<--, 4.34<--, 2.75<--, 2.49<--  PT/INR - ( 22 Feb 2025 04:17 )   PT: 15.9 sec;   INR: 1.39 ratio         PTT - ( 22 Feb 2025 04:17 )  PTT:35.6 sec  Urinalysis Basic - ( 23 Feb 2025 09:51 )    Color: x / Appearance: x / SG: x / pH: x  Gluc: 114 mg/dL / Ketone: x  / Bili: x / Urobili: x   Blood: x / Protein: x / Nitrite: x   Leuk Esterase: x / RBC: x / WBC x   Sq Epi: x / Non Sq Epi: x / Bacteria: x        Venous Blood Gas:  02-23 @ 12:58  7.17/57/58/21/87.4  VBG Lactate: 1.0  Venous Blood Gas:  02-22 @ 03:12  7.24/58/25/25/30.9  VBG Lactate: 1.6          ====================ASSESSMENT======================  75yo M w/ pmhx HTN, HLD, nonischemic cardiomyopathy, chronic systolic heart failure s/p HM2 LVAD (6/2017), s/p heart transplant from Hep. 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 Found to have septic shock, w/ pseudo in BCx. Worsening NORMAN.  Accepted to MICU for CRRT.    ====================NEUROLOGY=======================  # Metabolic encephalopathy.   - Patient baseline AOx3. AOx1 at ED arrival, iso of infection vs metabolic derangements (uremic encephelopathy). Currently AOx3 2/23 but lethargic.   - CTH and angio w/o hemorrhage or stenosis   - TSH wnl / B12, CK 2200  - Fall precautions and dysphagia screen  - spot EEG: negative for seizures     Plan  - Neurology recs appreciated   - Pending Infectious workup and possible LP 2/24.      ====================RESPIRATORY======================  No acute issue.      ====================CARDIOVASCULAR==================  # H/O heart transplant.   - Nonischemic cardiomyopathy, chronic systolic heart failure s/p HM2 LVAD (6/2017), s/p heart transplant from Hep. C donor (treated) 2/23/18   - Home Tacrolimus dose 2mg BID, Pred 15mg qd  - Intolerant of Cellcept due to leukopenia.    Plan  - Continue with home Pred 15mg qd  - Hold home Bactrim given hyperkalemia - can consider Atovaquone  - Holding ASA for now; if no more GIB and HgB stable, can re-start  - c/w home Atorvastatin (therapeutic interchange)   - HOLD home Metoprolol from 200mg with hold parameters due to hypotension (02/23)  - Tacro level daily : home regimen 2mg BID, c/w 1mg BID    # Paroxysmal atrial fibrillation.   - Home regimen : Eliquis 5 mg PO BID at home for hx of of afib and IJ thrombi  - EKG on admission - nsr   - [id number]  = 3  - Last Dose : 2/20 AM     Plan  - currently Eliquis holding in anticipation for LP 2/24.    # Hypertension (chronic)    Plan  - continue home Toprol  mg PO QD  - Holding home Losartan 75 mg PO QD due to NORMAN & hypotension.    # Vascular disorder of lower extremity.   - Extremities appear cool and dry     Plan  - c/w Local wound care by podiatry   - Podiatry recommendations - pending SHANEKA studies, consider vascular evaluation if abnormal.      ====================/RENAL========================  NORMAN on CKD.   - Known hx of CKD 3, Cr 2.4 uptrending  - Scr ranged from 1.3-2.1 in 2023. Most recent Scr was 1.75 on 1/30/24. On admission, Scr was 1.9 and is worsening now. UA showed trace ketones.   - No improvement with IVF, likely ATN i/s/o contrast/medications.     Plan  - IR consult for cath placement for possible HD per nephrology team as the patient has worsening kidney function  - Hold home ARB for now   - Euvolemic - will hold home Bumex 0.5mg  - c/w renvela for hyperphosphatemia.    #Hyperkalemia (resolved)   - K 6.2 at ED arrival  - shifted in the ED, no EKG changes consistent with severe hyper K. Cr not significantly elevated form baseline, c/f RTA from bactrim and Tacro. Stable   - s/p Lokelma 10mg BID for 3 days    Plan  - Hold home ARB  - f/u Tacro level daily   - Hold home bactrim - ID consult regarding Atovaquone   - c/w monitoring patient on Tele.      ====================GI/NUTRITION=====================    # Coffee ground emesis  - 1 episode of coffee ground emesis overnight  - unclear if true GIB  - Hb stable   - CTAP (2/22) ileus vs partial SBO - cannot r/o GI bleed    Plan  - monitor for further episodes  - c/w PPI IV 40 BID   - c/w trending CBC Q12H  - Holding DVT Ppx/ASA for now; if no more GIB and HgB stable, can re-start  - if repeat episode, GI consult   - If repeat episode or worsening lactate, CT angiogram with venous phase.      ====================SKIN=============================  #Swollen arm   - RUE swollen with hx of IJ thrombus, possible infiltrated IV. LUE now swollen. + Pulses, and no motor deficits   - lower concern for compartment syndrome  - RUE Duplex without thrombus     Plan  - c/w derm recs regarding bilateral bullae on upper extremities (Likely edema bullae; please see Derm note  - Elevate R arm, warm compresses and arm precautions.      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 No manifest signs of bowel ischemia.      Interval worsening of right lower lobe and right middle lobe atelectasis secondary to elevated right hemidiaphragm when compared to prior CT dated 7/11/2023. Superimposed infection in the collapsed lungs is not excluded    Plan  - Midodrine 5mg TID (2/23-  - c/w Meropenem (2/21- ), Hold off additional doses of Vancomycin (MRSA swab negative)  - c/w home Valganciclovir for ppx renally dosed   - Consider anti-fungal coverage as patient is immunocompromised   - f/u infectious labs - Toxoplasma, EBV, Fungitell, Galactomannan, CMV, Cryptococcus, MRSA, ASCENCION virus, CMV, Crypto, ASCENCION virus  - f/u GI PCR, consider C. Diff testing   - Transplant ID recommendations   - LP pending Monday 2/24.    ====================ENDOCRINE=======================  # Diabetes type 2.   - A1c 5.8    Plan  - ISS.    ====================HEMATOLOGIC/DVT PPx=============  # Hemolytic anemia.   - hx of hemolytic anemia, follows with Dr. Rosario as outpatient    Plan  - c/w Prednisone 15 mg PO QD   - f/u hematology/oncology recs  - Monitor H&H daily.      ====================ETHICS===========================  full code MICU ACCEPT NOTE    CHIEF COMPLAINT: Patient is a 74y old  Male who presents with a chief complaint of Lethargy (23 Feb 2025 12:19)      HPI:  73yo M w/ pmhx HTN, HLD, nonischemic cardiomyopathy, chronic systolic heart failure s/p HM2 LVAD (6/2017), s/p heart transplant from Hep. 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 On the phone, the patient's godbrother mentioned that on 2/18, the patient was in the car with his godbrother and was disoriented asking to get off on the road 4 blocks before his house. In addition, a caretaker stated that when she spoke to Mr. Hameed on the phone at 11am on 2/18, he was doing well. However, when the caretaker visited 2 hours later at 1pm, the patient was disoriented and felt his legs were heavy. This prompted the patient to come to the hospital.     In the ED: Hypothermic 91.4, HR 80, /60. Pt noted to be Hyperkalemic (6.2) with Mixed respiratory and metabolic acidosis pH 7.2. Pt was given Calcium Gluconate 2g, D50/Insulin 5 unit, 40 mg IV lasix, Lokelma 10mg. Vancomycin and Zosyn.     On the floor, pt was receiving midodrine and abx for septic shock. NORMAN worsened. Accepted to MICU for CRRT. Accepted to MICU for CRRT.        PAST MEDICAL & SURGICAL HISTORY:  HTN      SVT (Supraventricular Tachycardia)      Non-Ischemic Cardiomyopathy  now s/p transplant 2018      GIB (gastrointestinal bleeding)      Hepatitis C virus      DVT of upper extremity (deep vein thrombosis)      Former smoker      HLD (hyperlipidemia)      Knee pain, right      H/O autoimmune hemolytic anemia      H/O hemolytic anemia      Atrial fibrillation      Status post left hip replacement      H/O heart transplant  2/2018          FAMILY HISTORY:  No pertinent family history in first degree relatives        SOCIAL HISTORY:  Lives with godbrother. Ambulates with Cane.    MEDICATIONS  (STANDING):  atorvastatin 10 milliGRAM(s) Oral at bedtime  chlorhexidine 2% Cloths 1 Application(s) Topical daily  cholecalciferol 1000 Unit(s) Oral daily  dextrose 5%. 1000 milliLiter(s) (100 mL/Hr) IV Continuous <Continuous>  dextrose 5%. 1000 milliLiter(s) (50 mL/Hr) IV Continuous <Continuous>  dextrose 50% Injectable 25 Gram(s) IV Push once  dextrose 50% Injectable 12.5 Gram(s) IV Push once  dextrose 50% Injectable 25 Gram(s) IV Push once  glucagon  Injectable 1 milliGRAM(s) IntraMuscular once  insulin lispro (ADMELOG) corrective regimen sliding scale   SubCutaneous three times a day before meals  insulin lispro (ADMELOG) corrective regimen sliding scale   SubCutaneous at bedtime  meropenem  IVPB 1000 milliGRAM(s) IV Intermittent every 12 hours  meropenem  IVPB      metoprolol succinate  milliGRAM(s) Oral daily  midodrine 5 milliGRAM(s) Oral every 8 hours  nystatin    Suspension [id number] Unit(s) Oral four times a day  pantoprazole  Injectable 40 milliGRAM(s) IV Push two times a day  polyethylene glycol 3350 17 Gram(s) Oral daily  predniSONE   Tablet 15 milliGRAM(s) Oral daily  senna 2 Tablet(s) Oral daily  sevelamer carbonate 1600 milliGRAM(s) Oral every 8 hours  tacrolimus 1 milliGRAM(s) Oral two times a day  valGANciclovir 450 milliGRAM(s) Oral every 48 hours    MEDICATIONS  (PRN):  dextrose Oral Gel 15 Gram(s) Oral once PRN Blood Glucose LESS THAN 70 milliGRAM(s)/deciliter      Allergies    No Known Allergies    Intolerances        REVIEW OF SYSTEMS:  CONSTITUTIONAL: No weakness, fevers or chills  EYES/ENT: No visual changes;  No vertigo or throat pain   NECK: No pain or stiffness  RESPIRATORY: No cough, wheezing, hemoptysis; No shortness of breath  CARDIOVASCULAR: No chest pain or palpitations  GASTROINTESTINAL: No abdominal or epigastric pain. No nausea, vomiting, or hematemesis; No diarrhea or constipation. No melena or hematochezia.  GENITOURINARY: No dysuria, frequency or hematuria  NEUROLOGICAL: No numbness or weakness  SKIN: No itching, rashes  [ ] All other systems negative  [ ] Unable to assess ROS because ________    OBJECTIVE:  ICU Vital Signs Last 24 Hrs  T(C): 33.3 (23 Feb 2025 12:01), Max: 37.3 (23 Feb 2025 05:20)  T(F): 92 (23 Feb 2025 12:01), Max: 99.1 (23 Feb 2025 05:20)  HR: 73 (23 Feb 2025 10:48) (73 - 91)  BP: 93/62 (23 Feb 2025 10:48) (84/57 - 102/69)  BP(mean): --  ABP: --  ABP(mean): --  RR: 18 (23 Feb 2025 10:48) (18 - 20)  SpO2: 97% (23 Feb 2025 10:48) (95% - 100%)    O2 Parameters below as of 23 Feb 2025 10:48  Patient On (Oxygen Delivery Method): room air              02-22 @ 07:01  -  02-23 @ 07:00  --------------------------------------------------------  IN: 0 mL / OUT: 25 mL / NET: -25 mL      CAPILLARY BLOOD GLUCOSE      POCT Blood Glucose.: 135 mg/dL (23 Feb 2025 11:31)      PHYSICAL EXAM:  Constitutional: lethargic, laying in bed, arousable  HEENT: NC/AT, PERRLA, EOMI, no conjunctival pallor or scleral icterus, MMM  Neck: Supple, no JVD  Respiratory: CTA B/L. No w/r/r.   Cardiovascular: RRR, normal S1 and S2, no m/r/g.   Gastrointestinal: mildly distended, +BS, slight tenderness to palpation in the LUQ, LLQ, no guarding or rebound tenderness, no palpable masses  Extremities: wwp; no cyanosis, clubbing or edema. 2-3 bullae present on arms bilaterally.  Neurological: AAOx2 (self, place), no CN deficits, strength and sensation intact throughout.   Skin: 2-3 bullae present on arms bilaterally. Dry, flaking skin on lower extremities.      LABS:                        11.2   11.87 )-----------( 209      ( 23 Feb 2025 09:51 )             36.4     Hgb Trend: 11.2<--, 5.9<--, 12.1<--, 12.9<--, 12.5<--  02-23    140  |  101  |  106[H]  ----------------------------<  114[H]  4.4   |  20[L]  |  6.64[H]    Ca    8.5      23 Feb 2025 09:51  Phos  10.7     02-23  Mg     2.9     02-23    TPro  5.5[L]  /  Alb  2.5[L]  /  TBili  0.4  /  DBili  x   /  AST  43[H]  /  ALT  20  /  AlkPhos  60  02-23    Creatinine Trend: 6.64<--, 3.80<--, 4.65<--, 4.34<--, 2.75<--, 2.49<--  PT/INR - ( 22 Feb 2025 04:17 )   PT: 15.9 sec;   INR: 1.39 ratio         PTT - ( 22 Feb 2025 04:17 )  PTT:35.6 sec  Urinalysis Basic - ( 23 Feb 2025 09:51 )    Color: x / Appearance: x / SG: x / pH: x  Gluc: 114 mg/dL / Ketone: x  / Bili: x / Urobili: x   Blood: x / Protein: x / Nitrite: x   Leuk Esterase: x / RBC: x / WBC x   Sq Epi: x / Non Sq Epi: x / Bacteria: x        Venous Blood Gas:  02-23 @ 12:58  7.17/57/58/21/87.4  VBG Lactate: 1.0  Venous Blood Gas:  02-22 @ 03:12  7.24/58/25/25/30.9  VBG Lactate: 1.6          ====================ASSESSMENT======================  73yo M w/ pmhx HTN, HLD, nonischemic cardiomyopathy, chronic systolic heart failure s/p HM2 LVAD (6/2017), s/p heart transplant from Hep. 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 Found to have septic shock, w/ pseudo in BCx. Worsening NORMAN.  Accepted to MICU for CRRT.    ====================NEUROLOGY=======================  # Metabolic encephalopathy.   - Patient baseline AOx3. AOx1 at ED arrival, iso of infection vs metabolic derangements (uremic encephelopathy). Currently AOx3 2/23 but lethargic.   - CTH and angio w/o hemorrhage or stenosis   - TSH wnl / B12, CK 2200  - Fall precautions and dysphagia screen  - spot EEG: negative for seizures     Plan  - Neurology recs appreciated   - Pending Infectious workup and possible LP 2/24.      ====================RESPIRATORY======================  No acute issue.      ====================CARDIOVASCULAR==================  # H/O heart transplant.   - Nonischemic cardiomyopathy, chronic systolic heart failure s/p HM2 LVAD (6/2017), s/p heart transplant from Hep. C donor (treated) 2/23/18   - Home Tacrolimus dose 2mg BID, Pred 15mg qd  - Intolerant of Cellcept due to leukopenia.    Plan  - Continue with home Pred 15mg qd  - Hold home Bactrim given hyperkalemia - can consider Atovaquone  - Holding ASA for now; if no more GIB and HgB stable, can re-start  - c/w home Atorvastatin (therapeutic interchange)   - HOLD home Metoprolol from 200mg with hold parameters due to hypotension (02/23)  - Tacro level daily : home regimen 2mg BID, c/w 1mg BID    # Paroxysmal atrial fibrillation.   - Home regimen : Eliquis 5 mg PO BID at home for hx of of afib and IJ thrombi  - EKG on admission - nsr   - [id number]  = 3  - Last Dose : 2/20 AM     Plan  - currently Eliquis holding in anticipation for LP 2/24.    # Hypertension (chronic)    Plan  - continue home Toprol  mg PO QD  - Holding home Losartan 75 mg PO QD due to NORMAN & hypotension.    # Vascular disorder of lower extremity.   - Extremities appear cool and dry     Plan  - c/w Local wound care by podiatry   - Podiatry recommendations - pending SHANEKA studies, consider vascular evaluation if abnormal.      ====================/RENAL========================  NORMAN on CKD.   - Known hx of CKD 3, Cr 2.4 uptrending  - Scr ranged from 1.3-2.1 in 2023. Most recent Scr was 1.75 on 1/30/24. On admission, Scr was 1.9 and is worsening now. UA showed trace ketones.   - No improvement with IVF, likely ATN i/s/o contrast/medications.     Plan  - IR consult for cath placement for possible HD per nephrology team as the patient has worsening kidney function  - Hold home ARB for now   - Euvolemic - will hold home Bumex 0.5mg  - c/w renvela for hyperphosphatemia.    #Hyperkalemia (resolved)   - K 6.2 at ED arrival  - shifted in the ED, no EKG changes consistent with severe hyper K. Cr not significantly elevated form baseline, c/f RTA from bactrim and Tacro. Stable   - s/p Lokelma 10mg BID for 3 days    Plan  - Hold home ARB  - f/u Tacro level daily   - Hold home bactrim - ID consult regarding Atovaquone   - c/w monitoring patient on Tele.      ====================GI/NUTRITION=====================    # Coffee ground emesis  - 1 episode of coffee ground emesis overnight  - unclear if true GIB  - Hb stable   - CTAP (2/22) ileus vs partial SBO - cannot r/o GI bleed    Plan  - monitor for further episodes  - c/w PPI IV 40 BID   - c/w trending CBC Q12H  - Holding DVT Ppx/ASA for now; if no more GIB and HgB stable, can re-start  - if repeat episode, GI consult   - If repeat episode or worsening lactate, CT angiogram with venous phase.      ====================SKIN=============================  #Swollen arm   - RUE swollen with hx of IJ thrombus, possible infiltrated IV. LUE now swollen. + Pulses, and no motor deficits   - lower concern for compartment syndrome  - RUE Duplex without thrombus     Plan  - c/w derm recs regarding bilateral bullae on upper extremities (Likely edema bullae; please see Derm note  - Elevate R arm, warm compresses and arm precautions.      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 No manifest signs of bowel ischemia.      Interval worsening of right lower lobe and right middle lobe atelectasis secondary to elevated right hemidiaphragm when compared to prior CT dated 7/11/2023. Superimposed infection in the collapsed lungs is not excluded    Plan  - Midodrine 5mg TID (2/23-  - c/w Meropenem (2/21- ), Hold off additional doses of Vancomycin (MRSA swab negative)  - c/w home Valganciclovir for ppx renally dosed   - Consider anti-fungal coverage as patient is immunocompromised   - f/u infectious labs - Toxoplasma, EBV, Fungitell, Galactomannan, CMV, Cryptococcus, MRSA, ASCENCION virus, CMV, Crypto, ASCENCION virus  - f/u GI PCR, consider C. Diff testing   - Transplant ID recommendations   - LP pending Monday 2/24.    ====================ENDOCRINE=======================  # Diabetes type 2.   - A1c 5.8    Plan  - ISS.    ====================HEMATOLOGIC/DVT PPx=============  # Hemolytic anemia.   - hx of hemolytic anemia, follows with Dr. Rosario as outpatient    Plan  - c/w Prednisone 15 mg PO QD   - f/u hematology/oncology recs  - Monitor H&H daily.      ====================ETHICS===========================  full code MICU ACCEPT NOTE    CHIEF COMPLAINT: Patient is a 74y old  Male who presents with a chief complaint of Lethargy (23 Feb 2025 12:19)      HPI:  75yo M w/ pmhx HTN, HLD, nonischemic cardiomyopathy, chronic systolic heart failure s/p HM2 LVAD (6/2017), s/p heart transplant from Hep. 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 On the phone, the patient's godbrother mentioned that on 2/18, the patient was in the car with his godbrother and was disoriented asking to get off on the road 4 blocks before his house. In addition, a caretaker stated that when she spoke to Mr. Hameed on the phone at 11am on 2/18, he was doing well. However, when the caretaker visited 2 hours later at 1pm, the patient was disoriented and felt his legs were heavy. This prompted the patient to come to the hospital.     In the ED: Hypothermic 91.4, HR 80, /60. Pt noted to be Hyperkalemic (6.2) with Mixed respiratory and metabolic acidosis pH 7.2. Pt was given Calcium Gluconate 2g, D50/Insulin 5 unit, 40 mg IV lasix, Lokelma 10mg. Vancomycin and Zosyn.     On the floor, pt was receiving midodrine and abx for septic shock. NORMAN worsened. Accepted to MICU for CRRT. Accepted to MICU for CRRT.        PAST MEDICAL & SURGICAL HISTORY:  HTN      SVT (Supraventricular Tachycardia)      Non-Ischemic Cardiomyopathy  now s/p transplant 2018      GIB (gastrointestinal bleeding)      Hepatitis C virus      DVT of upper extremity (deep vein thrombosis)      Former smoker      HLD (hyperlipidemia)      Knee pain, right      H/O autoimmune hemolytic anemia      H/O hemolytic anemia      Atrial fibrillation      Status post left hip replacement      H/O heart transplant  2/2018          FAMILY HISTORY:  No pertinent family history in first degree relatives        SOCIAL HISTORY:  Lives with godbrother. Ambulates with Cane.    MEDICATIONS  (STANDING):  atorvastatin 10 milliGRAM(s) Oral at bedtime  chlorhexidine 2% Cloths 1 Application(s) Topical daily  cholecalciferol 1000 Unit(s) Oral daily  dextrose 5%. 1000 milliLiter(s) (100 mL/Hr) IV Continuous <Continuous>  dextrose 5%. 1000 milliLiter(s) (50 mL/Hr) IV Continuous <Continuous>  dextrose 50% Injectable 25 Gram(s) IV Push once  dextrose 50% Injectable 12.5 Gram(s) IV Push once  dextrose 50% Injectable 25 Gram(s) IV Push once  glucagon  Injectable 1 milliGRAM(s) IntraMuscular once  insulin lispro (ADMELOG) corrective regimen sliding scale   SubCutaneous three times a day before meals  insulin lispro (ADMELOG) corrective regimen sliding scale   SubCutaneous at bedtime  meropenem  IVPB 1000 milliGRAM(s) IV Intermittent every 12 hours  meropenem  IVPB      metoprolol succinate  milliGRAM(s) Oral daily  midodrine 5 milliGRAM(s) Oral every 8 hours  nystatin    Suspension 191512 Unit(s) Oral four times a day  pantoprazole  Injectable 40 milliGRAM(s) IV Push two times a day  polyethylene glycol 3350 17 Gram(s) Oral daily  predniSONE   Tablet 15 milliGRAM(s) Oral daily  senna 2 Tablet(s) Oral daily  sevelamer carbonate 1600 milliGRAM(s) Oral every 8 hours  tacrolimus 1 milliGRAM(s) Oral two times a day  valGANciclovir 450 milliGRAM(s) Oral every 48 hours    MEDICATIONS  (PRN):  dextrose Oral Gel 15 Gram(s) Oral once PRN Blood Glucose LESS THAN 70 milliGRAM(s)/deciliter      Allergies    No Known Allergies    Intolerances        REVIEW OF SYSTEMS:  CONSTITUTIONAL: No weakness, fevers or chills  EYES/ENT: No visual changes;  No vertigo or throat pain   NECK: No pain or stiffness  RESPIRATORY: No cough, wheezing, hemoptysis; No shortness of breath  CARDIOVASCULAR: No chest pain or palpitations  GASTROINTESTINAL: No abdominal or epigastric pain. No nausea, vomiting, or hematemesis; No diarrhea or constipation. No melena or hematochezia.  GENITOURINARY: No dysuria, frequency or hematuria  NEUROLOGICAL: No numbness or weakness  SKIN: No itching, rashes      OBJECTIVE:  ICU Vital Signs Last 24 Hrs  T(C): 33.3 (23 Feb 2025 12:01), Max: 37.3 (23 Feb 2025 05:20)  T(F): 92 (23 Feb 2025 12:01), Max: 99.1 (23 Feb 2025 05:20)  HR: 73 (23 Feb 2025 10:48) (73 - 91)  BP: 93/62 (23 Feb 2025 10:48) (84/57 - 102/69)  BP(mean): --  ABP: --  ABP(mean): --  RR: 18 (23 Feb 2025 10:48) (18 - 20)  SpO2: 97% (23 Feb 2025 10:48) (95% - 100%)    O2 Parameters below as of 23 Feb 2025 10:48  Patient On (Oxygen Delivery Method): room air              02-22 @ 07:01  -  02-23 @ 07:00  --------------------------------------------------------  IN: 0 mL / OUT: 25 mL / NET: -25 mL      CAPILLARY BLOOD GLUCOSE      POCT Blood Glucose.: 135 mg/dL (23 Feb 2025 11:31)      PHYSICAL EXAM:  Constitutional: lethargic, laying in bed, arousable  HEENT: NC/AT, PERRLA, EOMI, no conjunctival pallor or scleral icterus, MMM  Neck: Supple, no JVD  Respiratory: CTA B/L. No w/r/r.   Cardiovascular: RRR, normal S1 and S2, no m/r/g.   Gastrointestinal: mildly distended, +BS, slight tenderness to palpation in the LUQ, LLQ, no guarding or rebound tenderness, no palpable masses  Extremities: wwp; no cyanosis, clubbing or edema. 2-3 bullae present on arms bilaterally.  Neurological: AAOx2 (self, place), no CN deficits, strength and sensation intact throughout.   Skin: 2-3 bullae present on arms bilaterally. Dry, flaking skin on lower extremities.      LABS:                        11.2   11.87 )-----------( 209      ( 23 Feb 2025 09:51 )             36.4     Hgb Trend: 11.2<--, 5.9<--, 12.1<--, 12.9<--, 12.5<--  02-23    140  |  101  |  106[H]  ----------------------------<  114[H]  4.4   |  20[L]  |  6.64[H]    Ca    8.5      23 Feb 2025 09:51  Phos  10.7     02-23  Mg     2.9     02-23    TPro  5.5[L]  /  Alb  2.5[L]  /  TBili  0.4  /  DBili  x   /  AST  43[H]  /  ALT  20  /  AlkPhos  60  02-23    Creatinine Trend: 6.64<--, 3.80<--, 4.65<--, 4.34<--, 2.75<--, 2.49<--  PT/INR - ( 22 Feb 2025 04:17 )   PT: 15.9 sec;   INR: 1.39 ratio         PTT - ( 22 Feb 2025 04:17 )  PTT:35.6 sec  Urinalysis Basic - ( 23 Feb 2025 09:51 )    Color: x / Appearance: x / SG: x / pH: x  Gluc: 114 mg/dL / Ketone: x  / Bili: x / Urobili: x   Blood: x / Protein: x / Nitrite: x   Leuk Esterase: x / RBC: x / WBC x   Sq Epi: x / Non Sq Epi: x / Bacteria: x        Venous Blood Gas:  02-23 @ 12:58  7.17/57/58/21/87.4  VBG Lactate: 1.0  Venous Blood Gas:  02-22 @ 03:12  7.24/58/25/25/30.9  VBG Lactate: 1.6          ====================ASSESSMENT======================  75yo M w/ pmhx HTN, HLD, nonischemic cardiomyopathy, chronic systolic heart failure s/p HM2 LVAD (6/2017), s/p heart transplant from Hep. 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 Found to have septic shock, w/ pseudo in BCx. Worsening NORMAN.  Accepted to MICU for CRRT.    ====================NEUROLOGY=======================  # Metabolic encephalopathy.   - Patient baseline AOx3. AOx1 at ED arrival, iso of infection vs metabolic derangements (uremic encephelopathy). Currently AOx3 2/23 but lethargic.   - CTH and angio w/o hemorrhage or stenosis   - TSH wnl / B12, CK 2200  - Fall precautions and dysphagia screen  - spot EEG: negative for seizures     Plan  - Neurology recs appreciated   - Pending Infectious workup and possible LP 2/24.      ====================RESPIRATORY======================  No acute issue.      ====================CARDIOVASCULAR==================  # H/O heart transplant.   - Nonischemic cardiomyopathy, chronic systolic heart failure s/p HM2 LVAD (6/2017), s/p heart transplant from Hep. C donor (treated) 2/23/18   - Home Tacrolimus dose 2mg BID, Pred 15mg qd  - Intolerant of Cellcept due to leukopenia.    Plan  - Continue with home Pred 15mg qd  - Hold home Bactrim given hyperkalemia - can consider Atovaquone  - Holding ASA for now; if no more GIB and HgB stable, can re-start  - c/w home Atorvastatin (therapeutic interchange)   - HOLD home Metoprolol from 200mg with hold parameters due to hypotension (02/23)  - Tacro level daily : home regimen 2mg BID, c/w 1mg BID    # Paroxysmal atrial fibrillation.   - Home regimen : Eliquis 5 mg PO BID at home for hx of of afib and IJ thrombi  - EKG on admission - nsr   - [id number]  = 3  - Last Dose : 2/20 AM     Plan  - currently Eliquis holding in anticipation for LP 2/24.    # Hypertension (chronic)    Plan  - continue home Toprol  mg PO QD  - Holding home Losartan 75 mg PO QD due to NORMAN & hypotension.    # Vascular disorder of lower extremity.   - Extremities appear cool and dry     Plan  - c/w Local wound care by podiatry   - Podiatry recommendations - pending SHANEKA studies, consider vascular evaluation if abnormal.      ====================/RENAL========================  NORMAN on CKD.   - Known hx of CKD 3, Cr 2.4 uptrending  - Scr ranged from 1.3-2.1 in 2023. Most recent Scr was 1.75 on 1/30/24. On admission, Scr was 1.9 and is worsening now. UA showed trace ketones.   - No improvement with IVF, likely ATN i/s/o contrast/medications.     Plan  - Cath placement in preparation for CRRT  - Hold home ARB for now   - Euvolemic - will hold home Bumex 0.5mg  - c/w renvela for hyperphosphatemia.    #Hyperkalemia (resolved)   - K 6.2 at ED arrival  - shifted in the ED, no EKG changes consistent with severe hyper K. Cr not significantly elevated form baseline, c/f RTA from bactrim and Tacro. Stable   - s/p Lokelma 10mg BID for 3 days    Plan  - Hold home ARB  - f/u Tacro level daily   - Hold home bactrim - ID consult regarding Atovaquone   - c/w monitoring patient on Tele.      ====================GI/NUTRITION=====================    # Coffee ground emesis  - 1 episode of coffee ground emesis overnight  - unclear if true GIB  - Hb stable   - CTAP (2/22) ileus vs partial SBO - cannot r/o GI bleed    Plan  - monitor for further episodes  - c/w PPI IV 40 BID   - c/w trending CBC Q12H  - Holding DVT Ppx/ASA for now; if no more GIB and HgB stable, can re-start  - if repeat episode, GI consult   - If repeat episode or worsening lactate, CT angiogram with venous phase.      ====================SKIN=============================  #Swollen arm   - RUE swollen with hx of IJ thrombus, possible infiltrated IV. LUE now swollen. + Pulses, and no motor deficits   - lower concern for compartment syndrome  - RUE Duplex without thrombus     Plan  - c/w derm recs regarding bilateral bullae on upper extremities (Likely edema bullae; please see Derm note  - Elevate R arm, warm compresses and arm precautions.      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 No manifest signs of bowel ischemia.      Interval worsening of right lower lobe and right middle lobe atelectasis secondary to elevated right hemidiaphragm when compared to prior CT dated 7/11/2023. Superimposed infection in the collapsed lungs is not excluded    Plan  - Midodrine 5mg TID (2/23-  - c/w Meropenem (2/21- ), Hold off additional doses of Vancomycin (MRSA swab negative)  - c/w home Valganciclovir for ppx renally dosed   - Consider anti-fungal coverage as patient is immunocompromised   - f/u infectious labs - Toxoplasma, EBV, Fungitell, Galactomannan, CMV, Cryptococcus, MRSA, ASCENCION virus, CMV, Crypto, ASCENCION virus  - f/u GI PCR, consider C. Diff testing   - Transplant ID recommendations   - LP pending Monday 2/24.    ====================ENDOCRINE=======================  # Diabetes type 2.   - A1c 5.8    Plan  - ISS.    ====================HEMATOLOGIC/DVT PPx=============  # Hemolytic anemia.   - hx of hemolytic anemia, follows with Dr. Rosario as outpatient    Plan  - c/w Prednisone 15 mg PO QD   - f/u hematology/oncology recs  - Monitor H&H daily.      ====================ETHICS===========================  full code MICU ACCEPT NOTE    CHIEF COMPLAINT: Patient is a 74y old  Male who presents with a chief complaint of Lethargy (23 Feb 2025 12:19)      HPI:  75yo M w/ pmhx HTN, HLD, nonischemic cardiomyopathy, chronic systolic heart failure s/p HM2 LVAD (6/2017), s/p heart transplant from Hep. 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 On the phone, the patient's godbrother mentioned that on 2/18, the patient was in the car with his godbrother and was disoriented asking to get off on the road 4 blocks before his house. In addition, a caretaker stated that when she spoke to Mr. Hameed on the phone at 11am on 2/18, he was doing well. However, when the caretaker visited 2 hours later at 1pm, the patient was disoriented and felt his legs were heavy. This prompted the patient to come to the hospital.     In the ED: Hypothermic 91.4, HR 80, /60. Pt noted to be Hyperkalemic (6.2) with Mixed respiratory and metabolic acidosis pH 7.2. Pt was given Calcium Gluconate 2g, D50/Insulin 5 unit, 40 mg IV lasix, Lokelma 10mg. Vancomycin and Zosyn.     On the floor, pt was receiving midodrine and abx for septic shock. NORMAN worsened. Accepted to MICU for CRRT. Accepted to MICU for CRRT.        PAST MEDICAL & SURGICAL HISTORY:  HTN      SVT (Supraventricular Tachycardia)      Non-Ischemic Cardiomyopathy  now s/p transplant 2018      GIB (gastrointestinal bleeding)      Hepatitis C virus      DVT of upper extremity (deep vein thrombosis)      Former smoker      HLD (hyperlipidemia)      Knee pain, right      H/O autoimmune hemolytic anemia      H/O hemolytic anemia      Atrial fibrillation      Status post left hip replacement      H/O heart transplant  2/2018          FAMILY HISTORY:  No pertinent family history in first degree relatives        SOCIAL HISTORY:  Lives with godbrother. Ambulates with Cane.    MEDICATIONS  (STANDING):  atorvastatin 10 milliGRAM(s) Oral at bedtime  chlorhexidine 2% Cloths 1 Application(s) Topical daily  cholecalciferol 1000 Unit(s) Oral daily  dextrose 5%. 1000 milliLiter(s) (100 mL/Hr) IV Continuous <Continuous>  dextrose 5%. 1000 milliLiter(s) (50 mL/Hr) IV Continuous <Continuous>  dextrose 50% Injectable 25 Gram(s) IV Push once  dextrose 50% Injectable 12.5 Gram(s) IV Push once  dextrose 50% Injectable 25 Gram(s) IV Push once  glucagon  Injectable 1 milliGRAM(s) IntraMuscular once  insulin lispro (ADMELOG) corrective regimen sliding scale   SubCutaneous three times a day before meals  insulin lispro (ADMELOG) corrective regimen sliding scale   SubCutaneous at bedtime  meropenem  IVPB 1000 milliGRAM(s) IV Intermittent every 12 hours  meropenem  IVPB      metoprolol succinate  milliGRAM(s) Oral daily  midodrine 5 milliGRAM(s) Oral every 8 hours  nystatin    Suspension 495200 Unit(s) Oral four times a day  pantoprazole  Injectable 40 milliGRAM(s) IV Push two times a day  polyethylene glycol 3350 17 Gram(s) Oral daily  predniSONE   Tablet 15 milliGRAM(s) Oral daily  senna 2 Tablet(s) Oral daily  sevelamer carbonate 1600 milliGRAM(s) Oral every 8 hours  tacrolimus 1 milliGRAM(s) Oral two times a day  valGANciclovir 450 milliGRAM(s) Oral every 48 hours    MEDICATIONS  (PRN):  dextrose Oral Gel 15 Gram(s) Oral once PRN Blood Glucose LESS THAN 70 milliGRAM(s)/deciliter      Allergies    No Known Allergies    Intolerances        REVIEW OF SYSTEMS:  CONSTITUTIONAL: No weakness, fevers or chills  EYES/ENT: No visual changes;  No vertigo or throat pain   NECK: No pain or stiffness  RESPIRATORY: No cough, wheezing, hemoptysis; No shortness of breath  CARDIOVASCULAR: No chest pain or palpitations  GASTROINTESTINAL: No abdominal or epigastric pain. No nausea, vomiting, or hematemesis; No diarrhea or constipation. No melena or hematochezia.  GENITOURINARY: No dysuria, frequency or hematuria  NEUROLOGICAL: No numbness or weakness  SKIN: No itching, rashes      OBJECTIVE:  ICU Vital Signs Last 24 Hrs  T(C): 33.3 (23 Feb 2025 12:01), Max: 37.3 (23 Feb 2025 05:20)  T(F): 92 (23 Feb 2025 12:01), Max: 99.1 (23 Feb 2025 05:20)  HR: 73 (23 Feb 2025 10:48) (73 - 91)  BP: 93/62 (23 Feb 2025 10:48) (84/57 - 102/69)  BP(mean): --  ABP: --  ABP(mean): --  RR: 18 (23 Feb 2025 10:48) (18 - 20)  SpO2: 97% (23 Feb 2025 10:48) (95% - 100%)    O2 Parameters below as of 23 Feb 2025 10:48  Patient On (Oxygen Delivery Method): room air              02-22 @ 07:01  -  02-23 @ 07:00  --------------------------------------------------------  IN: 0 mL / OUT: 25 mL / NET: -25 mL      CAPILLARY BLOOD GLUCOSE      POCT Blood Glucose.: 135 mg/dL (23 Feb 2025 11:31)      PHYSICAL EXAM:  Constitutional: lethargic, laying in bed, arousable  HEENT: NC/AT, PERRLA, EOMI, no conjunctival pallor or scleral icterus, MMM  Neck: Supple, no JVD  Respiratory: CTA B/L. No w/r/r.   Cardiovascular: RRR, normal S1 and S2, no m/r/g.   Gastrointestinal: mildly distended, +BS, slight tenderness to palpation in the LUQ, LLQ, no guarding or rebound tenderness, no palpable masses  Extremities: wwp; no cyanosis, clubbing or edema. 2-3 bullae present on arms bilaterally.  Neurological: AAOx2 (self, place), no CN deficits, strength and sensation intact throughout.   Skin: 2-3 bullae present on arms bilaterally. Dry, flaking skin on lower extremities.      LABS:                        11.2   11.87 )-----------( 209      ( 23 Feb 2025 09:51 )             36.4     Hgb Trend: 11.2<--, 5.9<--, 12.1<--, 12.9<--, 12.5<--  02-23    140  |  101  |  106[H]  ----------------------------<  114[H]  4.4   |  20[L]  |  6.64[H]    Ca    8.5      23 Feb 2025 09:51  Phos  10.7     02-23  Mg     2.9     02-23    TPro  5.5[L]  /  Alb  2.5[L]  /  TBili  0.4  /  DBili  x   /  AST  43[H]  /  ALT  20  /  AlkPhos  60  02-23    Creatinine Trend: 6.64<--, 3.80<--, 4.65<--, 4.34<--, 2.75<--, 2.49<--  PT/INR - ( 22 Feb 2025 04:17 )   PT: 15.9 sec;   INR: 1.39 ratio         PTT - ( 22 Feb 2025 04:17 )  PTT:35.6 sec  Urinalysis Basic - ( 23 Feb 2025 09:51 )    Color: x / Appearance: x / SG: x / pH: x  Gluc: 114 mg/dL / Ketone: x  / Bili: x / Urobili: x   Blood: x / Protein: x / Nitrite: x   Leuk Esterase: x / RBC: x / WBC x   Sq Epi: x / Non Sq Epi: x / Bacteria: x        Venous Blood Gas:  02-23 @ 12:58  7.17/57/58/21/87.4  VBG Lactate: 1.0  Venous Blood Gas:  02-22 @ 03:12  7.24/58/25/25/30.9  VBG Lactate: 1.6          ====================ASSESSMENT======================  75yo M w/ pmhx HTN, HLD, nonischemic cardiomyopathy, chronic systolic heart failure s/p HM2 LVAD (6/2017), s/p heart transplant from Hep. 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 Found to have septic shock, w/ pseudo in BCx. Worsening NORMAN.  Accepted to MICU for CRRT.    ====================NEUROLOGY=======================  # Metabolic encephalopathy.  - Likely multi-factorial etiologies: uremic vs hypercapnic vs septic    - Patient baseline AOx3. AOx1 at ED arrival, iso of infection vs metabolic derangements (uremic encephelopathy). Currently AOx3 2/23 but lethargic.   - CTH and angio w/o hemorrhage or stenosis   - TSH wnl / B12, CK 2200  - Fall precautions and dysphagia screen  - spot EEG: negative for seizures     Plan  - Neurology recs appreciated   - Pending Infectious workup and possible LP 2/24.      ====================RESPIRATORY======================  # respiratory acidosis  - VpH 7.17, PCO2 57    Plan  - BIPAP      ====================CARDIOVASCULAR==================  # Hypotension  - Sepsis vs hypovolemic vs cardiac etiology    Plan  - F/u transplant ID recs  - POCUS    # H/O heart transplant.   - Nonischemic cardiomyopathy, chronic systolic heart failure s/p HM2 LVAD (6/2017), s/p heart transplant from Hep. C donor (treated) 2/23/18   - Home Tacrolimus dose 2mg BID, Pred 15mg qd  - Intolerant of Cellcept due to leukopenia.    Plan  - Continue with home Pred 15mg qd  - Hold home Bactrim given hyperkalemia - can consider Atovaquone  - Holding ASA for now; if no more GIB and HgB stable, can re-start  - c/w home Atorvastatin (therapeutic interchange)   - HOLD home Metoprolol from 200mg with hold parameters due to hypotension (02/23)  - Tacro level daily : home regimen 2mg BID, c/w 1mg BID    # Paroxysmal atrial fibrillation.   - Home regimen : Eliquis 5 mg PO BID at home for hx of of afib and IJ thrombi  - EKG on admission - nsr   - [id number]  = 3  - Last Dose : 2/20 AM     Plan  - currently Eliquis holding in anticipation for LP 2/24.    # Hypertension (chronic)    Plan  - continue home Toprol  mg PO QD  - Holding home Losartan 75 mg PO QD due to NORMAN & hypotension.    # Vascular disorder of lower extremity.   - Extremities appear cool and dry     Plan  - c/w Local wound care by podiatry   - Podiatry recommendations - pending SHANEKA studies, consider vascular evaluation if abnormal.      ====================/RENAL========================  NORMAN on CKD.   - Known hx of CKD 3, Cr 2.4 uptrending  - Scr ranged from 1.3-2.1 in 2023. Most recent Scr was 1.75 on 1/30/24. On admission, Scr was 1.9 and is worsening now. UA showed trace ketones.   - No improvement with IVF, likely ATN i/s/o contrast/medications.     Plan  - Cath placement in preparation for CRRT  - Hold home ARB for now   - Euvolemic - will hold home Bumex 0.5mg  - c/w renvela for hyperphosphatemia.    #Hyperkalemia (resolved)   - K 6.2 at ED arrival  - shifted in the ED, no EKG changes consistent with severe hyper K. Cr not significantly elevated form baseline, c/f RTA from bactrim and Tacro. Stable   - s/p Lokelma 10mg BID for 3 days    Plan  - Hold home ARB  - f/u Tacro level daily   - Hold home bactrim - ID consult regarding Atovaquone   - c/w monitoring patient on Tele.      ====================GI/NUTRITION=====================    # Coffee ground emesis  - 1 episode of coffee ground emesis overnight  - unclear if true GIB  - Hb stable   - CTAP (2/22) ileus vs partial SBO - cannot r/o GI bleed    Plan  - monitor for further episodes  - c/w PPI IV 40 BID   - c/w trending CBC Q12H  - Holding DVT Ppx/ASA for now; if no more GIB and HgB stable, can re-start  - if repeat episode, GI consult   - If repeat episode or worsening lactate, CT angiogram with venous phase.      ====================SKIN=============================  #Swollen arm   - RUE swollen with hx of IJ thrombus, possible infiltrated IV. LUE now swollen. + Pulses, and no motor deficits   - lower concern for compartment syndrome  - RUE Duplex without thrombus     Plan  - c/w derm recs regarding bilateral bullae on upper extremities (Likely edema bullae; please see Derm note  - Elevate R arm, warm compresses and arm precautions.      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 No manifest signs of bowel ischemia.      Interval worsening of right lower lobe and right middle lobe atelectasis secondary to elevated right hemidiaphragm when compared to prior CT dated 7/11/2023. Superimposed infection in the collapsed lungs is not excluded    Plan  - Midodrine 5mg TID (2/23-  - c/w Meropenem (2/21- ), Hold off additional doses of Vancomycin (MRSA swab negative)  - c/w home Valganciclovir for ppx renally dosed   - Consider anti-fungal coverage as patient is immunocompromised   - f/u infectious labs - Toxoplasma, EBV, Fungitell, Galactomannan, CMV, Cryptococcus, MRSA, ASCENCION virus, CMV, Crypto, ASCENCION virus  - f/u GI PCR, consider C. Diff testing   - Transplant ID recommendations   - LP pending Monday 2/24.    ====================ENDOCRINE=======================  # Diabetes type 2.   - A1c 5.8    Plan  - ISS.    ====================HEMATOLOGIC/DVT PPx=============  # Hemolytic anemia.   - hx of hemolytic anemia, follows with Dr. Rosario as outpatient    Plan  - c/w Prednisone 15 mg PO QD   - f/u hematology/oncology recs  - Monitor H&H daily.      ====================ETHICS===========================  full code MICU ACCEPT NOTE    CHIEF COMPLAINT: Patient is a 74y old  Male who presents with a chief complaint of Lethargy (23 Feb 2025 12:19)      HPI:  75yo M w/ pmhx HTN, HLD, nonischemic cardiomyopathy, chronic systolic heart failure s/p HM2 LVAD (6/2017), s/p heart transplant from Hep. 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 On the phone, the patient's godbrother mentioned that on 2/18, the patient was in the car with his godbrother and was disoriented asking to get off on the road 4 blocks before his house. In addition, a caretaker stated that when she spoke to Mr. Hameed on the phone at 11am on 2/18, he was doing well. However, when the caretaker visited 2 hours later at 1pm, the patient was disoriented and felt his legs were heavy. This prompted the patient to come to the hospital.     In the ED: Hypothermic 91.4, HR 80, /60. Pt noted to be Hyperkalemic (6.2) with Mixed respiratory and metabolic acidosis pH 7.2. Pt was given Calcium Gluconate 2g, D50/Insulin 5 unit, 40 mg IV lasix, Lokelma 10mg. Vancomycin and Zosyn.     On the floor, pt was receiving midodrine and abx for septic shock. NORMAN worsened. Accepted to MICU for CRRT. Accepted to MICU for CRRT.        PAST MEDICAL & SURGICAL HISTORY:  HTN      SVT (Supraventricular Tachycardia)      Non-Ischemic Cardiomyopathy  now s/p transplant 2018      GIB (gastrointestinal bleeding)      Hepatitis C virus      DVT of upper extremity (deep vein thrombosis)      Former smoker      HLD (hyperlipidemia)      Knee pain, right      H/O autoimmune hemolytic anemia      H/O hemolytic anemia      Atrial fibrillation      Status post left hip replacement      H/O heart transplant  2/2018          FAMILY HISTORY:  No pertinent family history in first degree relatives        SOCIAL HISTORY:  Lives with godbrother. Ambulates with Cane.    MEDICATIONS  (STANDING):  atorvastatin 10 milliGRAM(s) Oral at bedtime  chlorhexidine 2% Cloths 1 Application(s) Topical daily  cholecalciferol 1000 Unit(s) Oral daily  dextrose 5%. 1000 milliLiter(s) (100 mL/Hr) IV Continuous <Continuous>  dextrose 5%. 1000 milliLiter(s) (50 mL/Hr) IV Continuous <Continuous>  dextrose 50% Injectable 25 Gram(s) IV Push once  dextrose 50% Injectable 12.5 Gram(s) IV Push once  dextrose 50% Injectable 25 Gram(s) IV Push once  glucagon  Injectable 1 milliGRAM(s) IntraMuscular once  insulin lispro (ADMELOG) corrective regimen sliding scale   SubCutaneous three times a day before meals  insulin lispro (ADMELOG) corrective regimen sliding scale   SubCutaneous at bedtime  meropenem  IVPB 1000 milliGRAM(s) IV Intermittent every 12 hours  meropenem  IVPB      metoprolol succinate  milliGRAM(s) Oral daily  midodrine 5 milliGRAM(s) Oral every 8 hours  nystatin    Suspension 392361 Unit(s) Oral four times a day  pantoprazole  Injectable 40 milliGRAM(s) IV Push two times a day  polyethylene glycol 3350 17 Gram(s) Oral daily  predniSONE   Tablet 15 milliGRAM(s) Oral daily  senna 2 Tablet(s) Oral daily  sevelamer carbonate 1600 milliGRAM(s) Oral every 8 hours  tacrolimus 1 milliGRAM(s) Oral two times a day  valGANciclovir 450 milliGRAM(s) Oral every 48 hours    MEDICATIONS  (PRN):  dextrose Oral Gel 15 Gram(s) Oral once PRN Blood Glucose LESS THAN 70 milliGRAM(s)/deciliter      Allergies    No Known Allergies    Intolerances        REVIEW OF SYSTEMS:  CONSTITUTIONAL: No weakness, fevers or chills  EYES/ENT: No visual changes;  No vertigo or throat pain   NECK: No pain or stiffness  RESPIRATORY: No cough, wheezing, hemoptysis; No shortness of breath  CARDIOVASCULAR: No chest pain or palpitations  GASTROINTESTINAL: No abdominal or epigastric pain. No nausea, vomiting, or hematemesis; No diarrhea or constipation. No melena or hematochezia.  GENITOURINARY: No dysuria, frequency or hematuria  NEUROLOGICAL: No numbness or weakness  SKIN: No itching, rashes      OBJECTIVE:  ICU Vital Signs Last 24 Hrs  T(C): 33.3 (23 Feb 2025 12:01), Max: 37.3 (23 Feb 2025 05:20)  T(F): 92 (23 Feb 2025 12:01), Max: 99.1 (23 Feb 2025 05:20)  HR: 73 (23 Feb 2025 10:48) (73 - 91)  BP: 93/62 (23 Feb 2025 10:48) (84/57 - 102/69)  BP(mean): --  ABP: --  ABP(mean): --  RR: 18 (23 Feb 2025 10:48) (18 - 20)  SpO2: 97% (23 Feb 2025 10:48) (95% - 100%)    O2 Parameters below as of 23 Feb 2025 10:48  Patient On (Oxygen Delivery Method): room air              02-22 @ 07:01  -  02-23 @ 07:00  --------------------------------------------------------  IN: 0 mL / OUT: 25 mL / NET: -25 mL      CAPILLARY BLOOD GLUCOSE      POCT Blood Glucose.: 135 mg/dL (23 Feb 2025 11:31)      PHYSICAL EXAM:  Constitutional: lethargic, laying in bed, arousable  HEENT: NC/AT, PERRLA, EOMI, no conjunctival pallor or scleral icterus, MMM  Neck: Supple, no JVD  Respiratory: CTA B/L. No w/r/r.   Cardiovascular: RRR, normal S1 and S2, no m/r/g.   Gastrointestinal: mildly distended, +BS, slight tenderness to palpation in the LUQ, LLQ, no guarding or rebound tenderness, no palpable masses  Extremities: wwp; no cyanosis, clubbing or edema. 2-3 bullae present on arms bilaterally.  Neurological: AAOx2 (self, place), no CN deficits, strength and sensation intact throughout.   Skin: 2-3 bullae present on arms bilaterally. Dry, flaking skin on lower extremities.      LABS:                        11.2   11.87 )-----------( 209      ( 23 Feb 2025 09:51 )             36.4     Hgb Trend: 11.2<--, 5.9<--, 12.1<--, 12.9<--, 12.5<--  02-23    140  |  101  |  106[H]  ----------------------------<  114[H]  4.4   |  20[L]  |  6.64[H]    Ca    8.5      23 Feb 2025 09:51  Phos  10.7     02-23  Mg     2.9     02-23    TPro  5.5[L]  /  Alb  2.5[L]  /  TBili  0.4  /  DBili  x   /  AST  43[H]  /  ALT  20  /  AlkPhos  60  02-23    Creatinine Trend: 6.64<--, 3.80<--, 4.65<--, 4.34<--, 2.75<--, 2.49<--  PT/INR - ( 22 Feb 2025 04:17 )   PT: 15.9 sec;   INR: 1.39 ratio         PTT - ( 22 Feb 2025 04:17 )  PTT:35.6 sec  Urinalysis Basic - ( 23 Feb 2025 09:51 )    Color: x / Appearance: x / SG: x / pH: x  Gluc: 114 mg/dL / Ketone: x  / Bili: x / Urobili: x   Blood: x / Protein: x / Nitrite: x   Leuk Esterase: x / RBC: x / WBC x   Sq Epi: x / Non Sq Epi: x / Bacteria: x        Venous Blood Gas:  02-23 @ 12:58  7.17/57/58/21/87.4  VBG Lactate: 1.0  Venous Blood Gas:  02-22 @ 03:12  7.24/58/25/25/30.9  VBG Lactate: 1.6          ====================ASSESSMENT======================  75yo M w/ pmhx HTN, HLD, nonischemic cardiomyopathy, chronic systolic heart failure s/p HM2 LVAD (6/2017), s/p heart transplant from Hep. 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 Found to have septic shock, w/ pseudo in BCx. Worsening NORMAN.  Accepted to MICU for CRRT.    ====================NEUROLOGY=======================  # Metabolic encephalopathy.  - Likely multi-factorial etiologies: uremic vs hypercapnic vs septic    - Patient baseline AOx3. AOx1 at ED arrival, iso of infection vs metabolic derangements (uremic encephelopathy). Currently AOx3 2/23 but lethargic.   - CTH and angio w/o hemorrhage or stenosis   - TSH wnl / B12, CK 2200  - Fall precautions and dysphagia screen  - spot EEG: negative for seizures     Plan  - Neurology recs appreciated   - Pending Infectious workup and possible LP 2/24.      ====================RESPIRATORY======================  # Respiratory acidosis  - VpH 7.17, PCO2 57    Plan  - BIPAP      ====================CARDIOVASCULAR==================  # Hypotension  - Sepsis vs hypovolemic vs cardiac etiology    Plan  - F/u transplant ID recs  - POCUS    # H/O heart transplant.   - Nonischemic cardiomyopathy, chronic systolic heart failure s/p HM2 LVAD (6/2017), s/p heart transplant from Hep. C donor (treated) 2/23/18   - Home Tacrolimus dose 2mg BID, Pred 15mg qd  - Intolerant of Cellcept due to leukopenia.    Plan  - Continue with home Pred 15mg qd  - Hold home Bactrim given hyperkalemia - can consider Atovaquone  - Holding ASA for now; if no more GIB and HgB stable, can re-start  - c/w home Atorvastatin (therapeutic interchange)   - HOLD home Metoprolol from 200mg with hold parameters due to hypotension (02/23)  - Tacro level daily : home regimen 2mg BID, c/w 1mg BID    # Paroxysmal atrial fibrillation.   - Home regimen : Eliquis 5 mg PO BID at home for hx of of afib and IJ thrombi  - EKG on admission - nsr   - [id number]  = 3  - Last Dose : 2/20 AM     Plan  - currently Eliquis holding in anticipation for LP 2/24.    # Hypertension (chronic)    Plan  - continue home Toprol  mg PO QD  - Holding home Losartan 75 mg PO QD due to NORMAN & hypotension.    # Vascular disorder of lower extremity.   - Extremities appear cool and dry     Plan  - c/w Local wound care by podiatry   - Podiatry recommendations - pending SHANEKA studies, consider vascular evaluation if abnormal.      ====================/RENAL========================  NORMAN on CKD.   - Known hx of CKD 3, Cr 2.4 uptrending  - Scr ranged from 1.3-2.1 in 2023. Most recent Scr was 1.75 on 1/30/24. On admission, Scr was 1.9 and is worsening now. UA showed trace ketones.   - No improvement with IVF, likely ATN i/s/o contrast/medications.     Plan  - Cath placement in preparation for CRRT  - Hold home ARB for now   - Euvolemic - will hold home Bumex 0.5mg  - c/w renvela for hyperphosphatemia.    #Hyperkalemia (resolved)   - K 6.2 at ED arrival  - shifted in the ED, no EKG changes consistent with severe hyper K. Cr not significantly elevated form baseline, c/f RTA from bactrim and Tacro. Stable   - s/p Lokelma 10mg BID for 3 days    Plan  - Hold home ARB  - f/u Tacro level daily   - Hold home bactrim - ID consult regarding Atovaquone   - c/w monitoring patient on Tele.      ====================GI/NUTRITION=====================    # Coffee ground emesis  - 1 episode of coffee ground emesis overnight  - unclear if true GIB  - Hb stable   - CTAP (2/22) ileus vs partial SBO - cannot r/o GI bleed    Plan  - monitor for further episodes  - c/w PPI IV 40 BID   - c/w trending CBC Q12H  - Holding DVT Ppx/ASA for now; if no more GIB and HgB stable, can re-start  - if repeat episode, GI consult   - If repeat episode or worsening lactate, CT angiogram with venous phase.      ====================SKIN=============================  #Swollen arm   - RUE swollen with hx of IJ thrombus, possible infiltrated IV. LUE now swollen. + Pulses, and no motor deficits   - lower concern for compartment syndrome  - RUE Duplex without thrombus     Plan  - c/w derm recs regarding bilateral bullae on upper extremities (Likely edema bullae; please see Derm note  - Elevate R arm, warm compresses and arm precautions.      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 No manifest signs of bowel ischemia.      Interval worsening of right lower lobe and right middle lobe atelectasis secondary to elevated right hemidiaphragm when compared to prior CT dated 7/11/2023. Superimposed infection in the collapsed lungs is not excluded    Plan  - Midodrine 5mg TID (2/23-  - c/w Meropenem (2/21- ), Hold off additional doses of Vancomycin (MRSA swab negative)  - c/w home Valganciclovir for ppx renally dosed   - Consider anti-fungal coverage as patient is immunocompromised   - f/u infectious labs - Toxoplasma, EBV, Fungitell, Galactomannan, CMV, Cryptococcus, MRSA, ASCENCION virus, CMV, Crypto, ASCENCION virus  - f/u GI PCR, consider C. Diff testing   - Transplant ID recommendations   - LP pending Monday 2/24.    ====================ENDOCRINE=======================  # Diabetes type 2.   - A1c 5.8    Plan  - ISS.    ====================HEMATOLOGIC/DVT PPx=============  # Hemolytic anemia.   - hx of hemolytic anemia, follows with Dr. Rosario as outpatient    Plan  - c/w Prednisone 15 mg PO QD   - f/u hematology/oncology recs  - Monitor H&H daily.    # DVT ppx  - Hold AC ISO hypotension  - SCD    ====================ETHICS===========================  full code MICU ACCEPT NOTE    CHIEF COMPLAINT: Patient is a 74y old  Male who presents with a chief complaint of Lethargy (23 Feb 2025 12:19)      HPI:  73yo M w/ pmhx HTN, HLD, nonischemic cardiomyopathy, chronic systolic heart failure s/p HM2 LVAD (6/2017), s/p heart transplant from Hep. 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 On the phone, the patient's godbrother mentioned that on 2/18, the patient was in the car with his godbrother and was disoriented asking to get off on the road 4 blocks before his house. In addition, a caretaker stated that when she spoke to Mr. Hameed on the phone at 11am on 2/18, he was doing well. However, when the caretaker visited 2 hours later at 1pm, the patient was disoriented and felt his legs were heavy. This prompted the patient to come to the hospital.     In the ED: Hypothermic 91.4, HR 80, /60. Pt noted to be Hyperkalemic (6.2) with Mixed respiratory and metabolic acidosis pH 7.2. Pt was given Calcium Gluconate 2g, D50/Insulin 5 unit, 40 mg IV lasix, Lokelma 10mg. Vancomycin and Zosyn.     On the floor, pt was receiving midodrine and abx for septic shock. NORMAN worsened. Accepted to MICU for CRRT. Accepted to MICU for CRRT.        PAST MEDICAL & SURGICAL HISTORY:  HTN      SVT (Supraventricular Tachycardia)      Non-Ischemic Cardiomyopathy  now s/p transplant 2018      GIB (gastrointestinal bleeding)      Hepatitis C virus      DVT of upper extremity (deep vein thrombosis)      Former smoker      HLD (hyperlipidemia)      Knee pain, right      H/O autoimmune hemolytic anemia      H/O hemolytic anemia      Atrial fibrillation      Status post left hip replacement      H/O heart transplant  2/2018          FAMILY HISTORY:  No pertinent family history in first degree relatives        SOCIAL HISTORY:  Lives with godbrother. Ambulates with Cane.    MEDICATIONS  (STANDING):  atorvastatin 10 milliGRAM(s) Oral at bedtime  chlorhexidine 2% Cloths 1 Application(s) Topical daily  cholecalciferol 1000 Unit(s) Oral daily  dextrose 5%. 1000 milliLiter(s) (100 mL/Hr) IV Continuous <Continuous>  dextrose 5%. 1000 milliLiter(s) (50 mL/Hr) IV Continuous <Continuous>  dextrose 50% Injectable 25 Gram(s) IV Push once  dextrose 50% Injectable 12.5 Gram(s) IV Push once  dextrose 50% Injectable 25 Gram(s) IV Push once  glucagon  Injectable 1 milliGRAM(s) IntraMuscular once  insulin lispro (ADMELOG) corrective regimen sliding scale   SubCutaneous three times a day before meals  insulin lispro (ADMELOG) corrective regimen sliding scale   SubCutaneous at bedtime  meropenem  IVPB 1000 milliGRAM(s) IV Intermittent every 12 hours  meropenem  IVPB      metoprolol succinate  milliGRAM(s) Oral daily  midodrine 5 milliGRAM(s) Oral every 8 hours  nystatin    Suspension [id number] Unit(s) Oral four times a day  pantoprazole  Injectable 40 milliGRAM(s) IV Push two times a day  polyethylene glycol 3350 17 Gram(s) Oral daily  predniSONE   Tablet 15 milliGRAM(s) Oral daily  senna 2 Tablet(s) Oral daily  sevelamer carbonate 1600 milliGRAM(s) Oral every 8 hours  tacrolimus 1 milliGRAM(s) Oral two times a day  valGANciclovir 450 milliGRAM(s) Oral every 48 hours    MEDICATIONS  (PRN):  dextrose Oral Gel 15 Gram(s) Oral once PRN Blood Glucose LESS THAN 70 milliGRAM(s)/deciliter      Allergies    No Known Allergies    Intolerances        REVIEW OF SYSTEMS:  CONSTITUTIONAL: No weakness, fevers or chills  EYES/ENT: No visual changes;  No vertigo or throat pain   NECK: No pain or stiffness  RESPIRATORY: No cough, wheezing, hemoptysis; No shortness of breath  CARDIOVASCULAR: No chest pain or palpitations  GASTROINTESTINAL: No abdominal or epigastric pain. No nausea, vomiting, or hematemesis; No diarrhea or constipation. No melena or hematochezia.  GENITOURINARY: No dysuria, frequency or hematuria  NEUROLOGICAL: No numbness or weakness  SKIN: No itching, rashes      OBJECTIVE:  ICU Vital Signs Last 24 Hrs  T(C): 33.3 (23 Feb 2025 12:01), Max: 37.3 (23 Feb 2025 05:20)  T(F): 92 (23 Feb 2025 12:01), Max: 99.1 (23 Feb 2025 05:20)  HR: 73 (23 Feb 2025 10:48) (73 - 91)  BP: 93/62 (23 Feb 2025 10:48) (84/57 - 102/69)  BP(mean): --  ABP: --  ABP(mean): --  RR: 18 (23 Feb 2025 10:48) (18 - 20)  SpO2: 97% (23 Feb 2025 10:48) (95% - 100%)    O2 Parameters below as of 23 Feb 2025 10:48  Patient On (Oxygen Delivery Method): room air              02-22 @ 07:01  -  02-23 @ 07:00  --------------------------------------------------------  IN: 0 mL / OUT: 25 mL / NET: -25 mL      CAPILLARY BLOOD GLUCOSE      POCT Blood Glucose.: 135 mg/dL (23 Feb 2025 11:31)      PHYSICAL EXAM:  Constitutional: lethargic, laying in bed, arousable  HEENT: NC/AT, PERRLA, EOMI, no conjunctival pallor or scleral icterus, MMM  Neck: Supple, no JVD  Respiratory: CTA B/L. No w/r/r.   Cardiovascular: RRR, normal S1 and S2, no m/r/g.   Gastrointestinal: mildly distended, +BS, slight tenderness to palpation in the LUQ, LLQ, no guarding or rebound tenderness, no palpable masses  Extremities: wwp; no cyanosis, clubbing or edema. 2-3 bullae present on arms bilaterally.  Neurological: AAOx2 (self, place), no CN deficits, strength and sensation intact throughout.   Skin: 2-3 bullae present on arms bilaterally. Dry, flaking skin on lower extremities.      LABS:                        11.2   11.87 )-----------( 209      ( 23 Feb 2025 09:51 )             36.4     Hgb Trend: 11.2<--, 5.9<--, 12.1<--, 12.9<--, 12.5<--  02-23    140  |  101  |  106[H]  ----------------------------<  114[H]  4.4   |  20[L]  |  6.64[H]    Ca    8.5      23 Feb 2025 09:51  Phos  10.7     02-23  Mg     2.9     02-23    TPro  5.5[L]  /  Alb  2.5[L]  /  TBili  0.4  /  DBili  x   /  AST  43[H]  /  ALT  20  /  AlkPhos  60  02-23    Creatinine Trend: 6.64<--, 3.80<--, 4.65<--, 4.34<--, 2.75<--, 2.49<--  PT/INR - ( 22 Feb 2025 04:17 )   PT: 15.9 sec;   INR: 1.39 ratio         PTT - ( 22 Feb 2025 04:17 )  PTT:35.6 sec  Urinalysis Basic - ( 23 Feb 2025 09:51 )    Color: x / Appearance: x / SG: x / pH: x  Gluc: 114 mg/dL / Ketone: x  / Bili: x / Urobili: x   Blood: x / Protein: x / Nitrite: x   Leuk Esterase: x / RBC: x / WBC x   Sq Epi: x / Non Sq Epi: x / Bacteria: x        Venous Blood Gas:  02-23 @ 12:58  7.17/57/58/21/87.4  VBG Lactate: 1.0  Venous Blood Gas:  02-22 @ 03:12  7.24/58/25/25/30.9  VBG Lactate: 1.6          ====================ASSESSMENT======================  73yo M w/ pmhx HTN, HLD, nonischemic cardiomyopathy, chronic systolic heart failure s/p HM2 LVAD (6/2017), s/p heart transplant from Hep. 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 Found to have septic shock, w/ pseudo in BCx. Worsening NORMAN.  Accepted to MICU for CRRT.    ====================NEUROLOGY=======================  # Metabolic encephalopathy.  - Likely multi-factorial etiologies: uremic vs hypercapnic vs septic    - Patient baseline AOx3. AOx1 at ED arrival, iso of infection vs metabolic derangements (uremic encephelopathy). Currently AOx3 2/23 but lethargic.   - CTH and angio w/o hemorrhage or stenosis   - TSH wnl / B12, CK 2200  - Fall precautions and dysphagia screen  - spot EEG: negative for seizures     Plan  - Neurology recs appreciated   - Pending Infectious workup and possible LP 2/24.      ====================RESPIRATORY======================  # Respiratory acidosis  - VpH 7.17, PCO2 57    Plan  - BIPAP      ====================CARDIOVASCULAR==================  # Hypotension  - Sepsis vs hypovolemic vs cardiac etiology    Plan  - F/u transplant ID recs  - POCUS    # H/O heart transplant.   - Nonischemic cardiomyopathy, chronic systolic heart failure s/p HM2 LVAD (6/2017), s/p heart transplant from Hep. C donor (treated) 2/23/18   - Home Tacrolimus dose 2mg BID, Pred 15mg qd  - Intolerant of Cellcept due to leukopenia.    Plan  - Continue with home Pred 15mg qd  - Hold home Bactrim given hyperkalemia - can consider Atovaquone  - Holding ASA for now; if no more GIB and HgB stable, can re-start  - c/w home Atorvastatin (therapeutic interchange)   - HOLD home Metoprolol from 200mg with hold parameters due to hypotension (02/23)  - Tacro level daily : home regimen 2mg BID, c/w 1mg BID    # Paroxysmal atrial fibrillation.   - Home regimen : Eliquis 5 mg PO BID at home for hx of of afib and IJ thrombi  - EKG on admission - nsr   - [id number]  = 3  - Last Dose : 2/20 AM     Plan  - currently Eliquis holding in anticipation for LP 2/24.    # Hypertension (chronic)    Plan  - continue home Toprol  mg PO QD  - Holding home Losartan 75 mg PO QD due to NORMAN & hypotension.    # Vascular disorder of lower extremity.   - Extremities appear cool and dry     Plan  - c/w Local wound care by podiatry   - Podiatry recommendations - pending SHANEKA studies, consider vascular evaluation if abnormal.      ====================/RENAL========================  NORMAN on CKD.   - Known hx of CKD 3, Cr 2.4 uptrending  - Scr ranged from 1.3-2.1 in 2023. Most recent Scr was 1.75 on 1/30/24. On admission, Scr was 1.9 and is worsening now. UA showed trace ketones.   - No improvement with IVF, likely ATN i/s/o contrast/medications.     Plan  - Cath placement in preparation for CRRT  - Hold home ARB for now   - Euvolemic - will hold home Bumex 0.5mg  - c/w renvela for hyperphosphatemia.    #Hyperkalemia (resolved)   - K 6.2 at ED arrival  - shifted in the ED, no EKG changes consistent with severe hyper K. Cr not significantly elevated form baseline, c/f RTA from bactrim and Tacro. Stable   - s/p Lokelma 10mg BID for 3 days    Plan  - Hold home ARB  - f/u Tacro level daily   - Hold home bactrim - ID consult regarding Atovaquone   - c/w monitoring patient on Tele.      ====================GI/NUTRITION=====================    # Coffee ground emesis  - 1 episode of coffee ground emesis overnight  - unclear if true GIB  - Hb stable   - CTAP (2/22) ileus vs partial SBO - cannot r/o GI bleed    Plan  - monitor for further episodes  - c/w PPI IV 40 BID   - c/w trending CBC Q12H  - Holding DVT Ppx/ASA for now; if no more GIB and HgB stable, can re-start  - if repeat episode, GI consult   - If repeat episode or worsening lactate, CT angiogram with venous phase.      ====================SKIN=============================  #Swollen arm   - RUE swollen with hx of IJ thrombus, possible infiltrated IV. LUE now swollen. + Pulses, and no motor deficits   - lower concern for compartment syndrome  - RUE Duplex without thrombus     Plan  - c/w derm recs regarding bilateral bullae on upper extremities (Likely edema bullae; please see Derm note  - Elevate R arm, warm compresses and arm precautions.      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 No manifest signs of bowel ischemia.      Interval worsening of right lower lobe and right middle lobe atelectasis secondary to elevated right hemidiaphragm when compared to prior CT dated 7/11/2023. Superimposed infection in the collapsed lungs is not excluded    Plan  - Midodrine 5mg TID (2/23-  - c/w Meropenem (2/21- ), Hold off additional doses of Vancomycin (MRSA swab negative)  - c/w home Valganciclovir for ppx renally dosed   - Consider anti-fungal coverage as patient is immunocompromised   - f/u infectious labs - Toxoplasma, EBV, Fungitell, Galactomannan, CMV, Cryptococcus, MRSA, ASCENCION virus, CMV, Crypto, ASCENCION virus  - f/u GI PCR, consider C. Diff testing   - Transplant ID recommendations   - LP pending Monday 2/24  - Warm blanket    ====================ENDOCRINE=======================  # Diabetes type 2.   - A1c 5.8    Plan  - ISS.    ====================HEMATOLOGIC/DVT PPx=============  # Hx of hemolytic anemia  - hx of hemolytic anemia, follows with Dr. Rosario as outpatient    Plan  - c/w Prednisone 15 mg PO QD   - f/u hematology/oncology recs  - Monitor H&H daily.    # DVT ppx  - Hold AC ISO hypotension  - SCD    ====================ETHICS===========================  full code MICU ACCEPT NOTE    CHIEF COMPLAINT: Patient is a 74y old  Male who presents with a chief complaint of Lethargy (23 Feb 2025 12:19)      HPI:  75yo M w/ pmhx HTN, HLD, nonischemic cardiomyopathy, chronic systolic heart failure s/p HM2 LVAD (6/2017), s/p heart transplant from Hep. 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 On the phone, the patient's godbrother mentioned that on 2/18, the patient was in the car with his godbrother and was disoriented asking to get off on the road 4 blocks before his house. In addition, a caretaker stated that when she spoke to Mr. Hameed on the phone at 11am on 2/18, he was doing well. However, when the caretaker visited 2 hours later at 1pm, the patient was disoriented and felt his legs were heavy. This prompted the patient to come to the hospital.     In the ED: Hypothermic 91.4, HR 80, /60. Pt noted to be Hyperkalemic (6.2) with Mixed respiratory and metabolic acidosis pH 7.2. Pt was given Calcium Gluconate 2g, D50/Insulin 5 unit, 40 mg IV lasix, Lokelma 10mg. Vancomycin and Zosyn.     On the floor, pt was receiving midodrine and abx for septic shock. NORMAN worsened. Accepted to MICU for CRRT.         PAST MEDICAL & SURGICAL HISTORY:  HTN      SVT (Supraventricular Tachycardia)      Non-Ischemic Cardiomyopathy  now s/p transplant 2018      GIB (gastrointestinal bleeding)      Hepatitis C virus      DVT of upper extremity (deep vein thrombosis)      Former smoker      HLD (hyperlipidemia)      Knee pain, right      H/O autoimmune hemolytic anemia      H/O hemolytic anemia      Atrial fibrillation      Status post left hip replacement      H/O heart transplant  2/2018          FAMILY HISTORY:  No pertinent family history in first degree relatives        SOCIAL HISTORY:  Lives with godbrother. Ambulates with Cane.    MEDICATIONS  (STANDING):  atorvastatin 10 milliGRAM(s) Oral at bedtime  chlorhexidine 2% Cloths 1 Application(s) Topical daily  cholecalciferol 1000 Unit(s) Oral daily  dextrose 5%. 1000 milliLiter(s) (100 mL/Hr) IV Continuous <Continuous>  dextrose 5%. 1000 milliLiter(s) (50 mL/Hr) IV Continuous <Continuous>  dextrose 50% Injectable 25 Gram(s) IV Push once  dextrose 50% Injectable 12.5 Gram(s) IV Push once  dextrose 50% Injectable 25 Gram(s) IV Push once  glucagon  Injectable 1 milliGRAM(s) IntraMuscular once  insulin lispro (ADMELOG) corrective regimen sliding scale   SubCutaneous three times a day before meals  insulin lispro (ADMELOG) corrective regimen sliding scale   SubCutaneous at bedtime  meropenem  IVPB 1000 milliGRAM(s) IV Intermittent every 12 hours  meropenem  IVPB      metoprolol succinate  milliGRAM(s) Oral daily  midodrine 5 milliGRAM(s) Oral every 8 hours  nystatin    Suspension 723148 Unit(s) Oral four times a day  pantoprazole  Injectable 40 milliGRAM(s) IV Push two times a day  polyethylene glycol 3350 17 Gram(s) Oral daily  predniSONE   Tablet 15 milliGRAM(s) Oral daily  senna 2 Tablet(s) Oral daily  sevelamer carbonate 1600 milliGRAM(s) Oral every 8 hours  tacrolimus 1 milliGRAM(s) Oral two times a day  valGANciclovir 450 milliGRAM(s) Oral every 48 hours    MEDICATIONS  (PRN):  dextrose Oral Gel 15 Gram(s) Oral once PRN Blood Glucose LESS THAN 70 milliGRAM(s)/deciliter      Allergies    No Known Allergies    Intolerances        REVIEW OF SYSTEMS:  CONSTITUTIONAL: No weakness, fevers or chills  EYES/ENT: No visual changes;  No vertigo or throat pain   NECK: No pain or stiffness  RESPIRATORY: No cough, wheezing, hemoptysis; No shortness of breath  CARDIOVASCULAR: No chest pain or palpitations  GASTROINTESTINAL: No abdominal or epigastric pain. No nausea, vomiting, or hematemesis; No diarrhea or constipation. No melena or hematochezia.  GENITOURINARY: No dysuria, frequency or hematuria  NEUROLOGICAL: No numbness or weakness  SKIN: No itching, rashes      OBJECTIVE:  ICU Vital Signs Last 24 Hrs  T(C): 33.3 (23 Feb 2025 12:01), Max: 37.3 (23 Feb 2025 05:20)  T(F): 92 (23 Feb 2025 12:01), Max: 99.1 (23 Feb 2025 05:20)  HR: 73 (23 Feb 2025 10:48) (73 - 91)  BP: 93/62 (23 Feb 2025 10:48) (84/57 - 102/69)  BP(mean): --  ABP: --  ABP(mean): --  RR: 18 (23 Feb 2025 10:48) (18 - 20)  SpO2: 97% (23 Feb 2025 10:48) (95% - 100%)    O2 Parameters below as of 23 Feb 2025 10:48  Patient On (Oxygen Delivery Method): room air              02-22 @ 07:01  -  02-23 @ 07:00  --------------------------------------------------------  IN: 0 mL / OUT: 25 mL / NET: -25 mL      CAPILLARY BLOOD GLUCOSE      POCT Blood Glucose.: 135 mg/dL (23 Feb 2025 11:31)      PHYSICAL EXAM:  Constitutional: lethargic, laying in bed, arousable  HEENT: NC/AT, PERRLA, EOMI, no conjunctival pallor or scleral icterus, MMM  Neck: Supple, no JVD  Respiratory: CTA B/L. No w/r/r.   Cardiovascular: RRR, normal S1 and S2, no m/r/g.   Gastrointestinal: mildly distended, +BS, slight tenderness to palpation in the LUQ, LLQ, no guarding or rebound tenderness, no palpable masses  Extremities: wwp; no cyanosis, clubbing or edema. 2-3 bullae present on arms bilaterally.  Neurological: AAOx2 (self, place), no CN deficits, strength and sensation intact throughout.   Skin: 2-3 bullae present on arms bilaterally. Dry, flaking skin on lower extremities.      LABS:                        11.2   11.87 )-----------( 209      ( 23 Feb 2025 09:51 )             36.4     Hgb Trend: 11.2<--, 5.9<--, 12.1<--, 12.9<--, 12.5<--  02-23    140  |  101  |  106[H]  ----------------------------<  114[H]  4.4   |  20[L]  |  6.64[H]    Ca    8.5      23 Feb 2025 09:51  Phos  10.7     02-23  Mg     2.9     02-23    TPro  5.5[L]  /  Alb  2.5[L]  /  TBili  0.4  /  DBili  x   /  AST  43[H]  /  ALT  20  /  AlkPhos  60  02-23    Creatinine Trend: 6.64<--, 3.80<--, 4.65<--, 4.34<--, 2.75<--, 2.49<--  PT/INR - ( 22 Feb 2025 04:17 )   PT: 15.9 sec;   INR: 1.39 ratio         PTT - ( 22 Feb 2025 04:17 )  PTT:35.6 sec  Urinalysis Basic - ( 23 Feb 2025 09:51 )    Color: x / Appearance: x / SG: x / pH: x  Gluc: 114 mg/dL / Ketone: x  / Bili: x / Urobili: x   Blood: x / Protein: x / Nitrite: x   Leuk Esterase: x / RBC: x / WBC x   Sq Epi: x / Non Sq Epi: x / Bacteria: x        Venous Blood Gas:  02-23 @ 12:58  7.17/57/58/21/87.4  VBG Lactate: 1.0  Venous Blood Gas:  02-22 @ 03:12  7.24/58/25/25/30.9  VBG Lactate: 1.6          ====================ASSESSMENT======================  75yo M w/ pmhx HTN, HLD, nonischemic cardiomyopathy, chronic systolic heart failure s/p HM2 LVAD (6/2017), s/p heart transplant from Hep. 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 Found to have septic shock, w/ pseudo in BCx. Worsening NORMAN.  Accepted to MICU for CRRT.    ====================NEUROLOGY=======================  # Metabolic encephalopathy.  - Likely multi-factorial etiologies: uremic vs hypercapnic vs septic    - Patient baseline AOx3. AOx1 at ED arrival, iso of infection vs metabolic derangements (uremic encephelopathy). Currently AOx3 2/23 but lethargic.   - CTH and angio w/o hemorrhage or stenosis   - TSH wnl / B12, CK 2200  - Fall precautions and dysphagia screen  - spot EEG: negative for seizures     Plan  - Neurology recs appreciated   - Pending Infectious workup and possible LP 2/24.      ====================RESPIRATORY======================  # Respiratory acidosis  - VpH 7.17, PCO2 57    Plan  - BIPAP      ====================CARDIOVASCULAR==================  # Hypotension  - Sepsis vs hypovolemic vs cardiac etiology    Plan  - F/u transplant ID recs  - POCUS    # H/O heart transplant.   - Nonischemic cardiomyopathy, chronic systolic heart failure s/p HM2 LVAD (6/2017), s/p heart transplant from Hep. C donor (treated) 2/23/18   - Home Tacrolimus dose 2mg BID, Pred 15mg qd  - Intolerant of Cellcept due to leukopenia.    Plan  - Continue with home Pred 15mg qd  - Hold home Bactrim given hyperkalemia - can consider Atovaquone  - Holding ASA for now; if no more GIB and HgB stable, can re-start  - c/w home Atorvastatin (therapeutic interchange)   - HOLD home Metoprolol from 200mg with hold parameters due to hypotension (02/23)  - Tacro level daily : home regimen 2mg BID, c/w 1mg BID    # Paroxysmal atrial fibrillation.   - Home regimen : Eliquis 5 mg PO BID at home for hx of of afib and IJ thrombi  - EKG on admission - nsr   - [id number]  = 3  - Last Dose : 2/20 AM     Plan  - currently Eliquis holding in anticipation for LP 2/24.    # Hypertension (chronic)    Plan  - continue home Toprol  mg PO QD  - Holding home Losartan 75 mg PO QD due to NORMAN & hypotension.    # Vascular disorder of lower extremity.   - Extremities appear cool and dry     Plan  - c/w Local wound care by podiatry   - Podiatry recommendations - pending SHANEKA studies, consider vascular evaluation if abnormal.      ====================/RENAL========================  NORMAN on CKD.   - Known hx of CKD 3, Cr 2.4 uptrending  - Scr ranged from 1.3-2.1 in 2023. Most recent Scr was 1.75 on 1/30/24. On admission, Scr was 1.9 and is worsening now. UA showed trace ketones.   - No improvement with IVF, likely ATN i/s/o contrast/medications.     Plan  - Cath placement in preparation for CRRT  - Hold home ARB for now   - Euvolemic - will hold home Bumex 0.5mg  - c/w renvela for hyperphosphatemia.    #Hyperkalemia (resolved)   - K 6.2 at ED arrival  - shifted in the ED, no EKG changes consistent with severe hyper K. Cr not significantly elevated form baseline, c/f RTA from bactrim and Tacro. Stable   - s/p Lokelma 10mg BID for 3 days    Plan  - Hold home ARB  - f/u Tacro level daily   - Hold home bactrim - ID consult regarding Atovaquone   - c/w monitoring patient on Tele.      ====================GI/NUTRITION=====================    # Coffee ground emesis  - 1 episode of coffee ground emesis overnight  - unclear if true GIB  - Hb stable   - CTAP (2/22) ileus vs partial SBO - cannot r/o GI bleed    Plan  - monitor for further episodes  - c/w PPI IV 40 BID   - c/w trending CBC Q12H  - Holding DVT Ppx/ASA for now; if no more GIB and HgB stable, can re-start  - if repeat episode, GI consult   - If repeat episode or worsening lactate, CT angiogram with venous phase.      ====================SKIN=============================  #Swollen arm   - RUE swollen with hx of IJ thrombus, possible infiltrated IV. LUE now swollen. + Pulses, and no motor deficits   - lower concern for compartment syndrome  - RUE Duplex without thrombus     Plan  - c/w derm recs regarding bilateral bullae on upper extremities (Likely edema bullae; please see Derm note  - Elevate R arm, warm compresses and arm precautions.      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 No manifest signs of bowel ischemia.      Interval worsening of right lower lobe and right middle lobe atelectasis secondary to elevated right hemidiaphragm when compared to prior CT dated 7/11/2023. Superimposed infection in the collapsed lungs is not excluded    Plan  - Midodrine 5mg TID (2/23-  - c/w Meropenem (2/21- ), Hold off additional doses of Vancomycin (MRSA swab negative)  - c/w home Valganciclovir for ppx renally dosed   - Consider anti-fungal coverage as patient is immunocompromised   - f/u infectious labs - Toxoplasma, EBV, Fungitell, Galactomannan, CMV, Cryptococcus, MRSA, ASCENCION virus, CMV, Crypto, ASCENCION virus  - f/u GI PCR, consider C. Diff testing   - Transplant ID recommendations   - LP pending Monday 2/24  - Warm blanket    ====================ENDOCRINE=======================  # Diabetes type 2.   - A1c 5.8    Plan  - ISS.    ====================HEMATOLOGIC/DVT PPx=============  # Hx of hemolytic anemia  - hx of hemolytic anemia, follows with Dr. Rosario as outpatient    Plan  - c/w Prednisone 15 mg PO QD   - f/u hematology/oncology recs  - Monitor H&H daily.    # DVT ppx  - Hold AC ISO hypotension  - SCD    ====================ETHICS===========================  full code MICU ACCEPT NOTE    CHIEF COMPLAINT: Patient is a 74y old  Male who presents with a chief complaint of Lethargy (23 Feb 2025 12:19)      HPI:  75yo M w/ pmhx HTN, HLD, nonischemic cardiomyopathy, chronic systolic heart failure s/p HM2 LVAD (6/2017), s/p heart transplant from Hep. 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 On the phone, the patient's godbrother mentioned that on 2/18, the patient was in the car with his godbrother and was disoriented asking to get off on the road 4 blocks before his house. In addition, a caretaker stated that when she spoke to Mr. Hameed on the phone at 11am on 2/18, he was doing well. However, when the caretaker visited 2 hours later at 1pm, the patient was disoriented and felt his legs were heavy. This prompted the patient to come to the hospital.     In the ED: Hypothermic 91.4, HR 80, /60. Pt noted to be Hyperkalemic (6.2) with Mixed respiratory and metabolic acidosis pH 7.2. Pt was given Calcium Gluconate 2g, D50/Insulin 5 unit, 40 mg IV lasix, Lokelma 10mg. Vancomycin and Zosyn.     On the floor, pt was receiving midodrine and abx for septic shock. NORMAN worsened. Accepted to MICU for CRRT.         PAST MEDICAL & SURGICAL HISTORY:  HTN      SVT (Supraventricular Tachycardia)      Non-Ischemic Cardiomyopathy  now s/p transplant 2018      GIB (gastrointestinal bleeding)      Hepatitis C virus      DVT of upper extremity (deep vein thrombosis)      Former smoker      HLD (hyperlipidemia)      Knee pain, right      H/O autoimmune hemolytic anemia      H/O hemolytic anemia      Atrial fibrillation      Status post left hip replacement      H/O heart transplant  2/2018          FAMILY HISTORY:  No pertinent family history in first degree relatives        SOCIAL HISTORY:  Lives with godbrother. Ambulates with Cane.    MEDICATIONS  (STANDING):  atorvastatin 10 milliGRAM(s) Oral at bedtime  chlorhexidine 2% Cloths 1 Application(s) Topical daily  cholecalciferol 1000 Unit(s) Oral daily  dextrose 5%. 1000 milliLiter(s) (100 mL/Hr) IV Continuous <Continuous>  dextrose 5%. 1000 milliLiter(s) (50 mL/Hr) IV Continuous <Continuous>  dextrose 50% Injectable 25 Gram(s) IV Push once  dextrose 50% Injectable 12.5 Gram(s) IV Push once  dextrose 50% Injectable 25 Gram(s) IV Push once  glucagon  Injectable 1 milliGRAM(s) IntraMuscular once  insulin lispro (ADMELOG) corrective regimen sliding scale   SubCutaneous three times a day before meals  insulin lispro (ADMELOG) corrective regimen sliding scale   SubCutaneous at bedtime  meropenem  IVPB 1000 milliGRAM(s) IV Intermittent every 12 hours  meropenem  IVPB      metoprolol succinate  milliGRAM(s) Oral daily  midodrine 5 milliGRAM(s) Oral every 8 hours  nystatin    Suspension 325908 Unit(s) Oral four times a day  pantoprazole  Injectable 40 milliGRAM(s) IV Push two times a day  polyethylene glycol 3350 17 Gram(s) Oral daily  predniSONE   Tablet 15 milliGRAM(s) Oral daily  senna 2 Tablet(s) Oral daily  sevelamer carbonate 1600 milliGRAM(s) Oral every 8 hours  tacrolimus 1 milliGRAM(s) Oral two times a day  valGANciclovir 450 milliGRAM(s) Oral every 48 hours    MEDICATIONS  (PRN):  dextrose Oral Gel 15 Gram(s) Oral once PRN Blood Glucose LESS THAN 70 milliGRAM(s)/deciliter      Allergies    No Known Allergies    Intolerances        REVIEW OF SYSTEMS:  CONSTITUTIONAL: No weakness, fevers or chills  EYES/ENT: No visual changes;  No vertigo or throat pain   NECK: No pain or stiffness  RESPIRATORY: No cough, wheezing, hemoptysis; No shortness of breath  CARDIOVASCULAR: No chest pain or palpitations  GASTROINTESTINAL: No abdominal or epigastric pain. No nausea, vomiting, or hematemesis; No diarrhea or constipation. No melena or hematochezia.  GENITOURINARY: No dysuria, frequency or hematuria  NEUROLOGICAL: No numbness or weakness  SKIN: No itching, rashes      OBJECTIVE:  ICU Vital Signs Last 24 Hrs  T(C): 33.3 (23 Feb 2025 12:01), Max: 37.3 (23 Feb 2025 05:20)  T(F): 92 (23 Feb 2025 12:01), Max: 99.1 (23 Feb 2025 05:20)  HR: 73 (23 Feb 2025 10:48) (73 - 91)  BP: 93/62 (23 Feb 2025 10:48) (84/57 - 102/69)  BP(mean): --  ABP: --  ABP(mean): --  RR: 18 (23 Feb 2025 10:48) (18 - 20)  SpO2: 97% (23 Feb 2025 10:48) (95% - 100%)    O2 Parameters below as of 23 Feb 2025 10:48  Patient On (Oxygen Delivery Method): room air              02-22 @ 07:01  -  02-23 @ 07:00  --------------------------------------------------------  IN: 0 mL / OUT: 25 mL / NET: -25 mL      CAPILLARY BLOOD GLUCOSE      POCT Blood Glucose.: 135 mg/dL (23 Feb 2025 11:31)      PHYSICAL EXAM:  Constitutional: lethargic, laying in bed, arousable  HEENT: NC/AT, PERRLA, EOMI, no conjunctival pallor or scleral icterus, MMM  Neck: Supple, no JVD  Respiratory: CTA B/L. No w/r/r.   Cardiovascular: RRR, normal S1 and S2, no m/r/g.   Gastrointestinal: mildly distended, +BS, slight tenderness to palpation in the LUQ, LLQ, no guarding or rebound tenderness, no palpable masses  Extremities: wwp; no cyanosis, clubbing or edema. 2-3 bullae present on arms bilaterally.  Neurological: AAOx2 (self, place), no CN deficits, strength and sensation intact throughout.   Skin: 2-3 bullae present on arms bilaterally. Dry, flaking skin on lower extremities.      LABS:                        11.2   11.87 )-----------( 209      ( 23 Feb 2025 09:51 )             36.4     Hgb Trend: 11.2<--, 5.9<--, 12.1<--, 12.9<--, 12.5<--  02-23    140  |  101  |  106[H]  ----------------------------<  114[H]  4.4   |  20[L]  |  6.64[H]    Ca    8.5      23 Feb 2025 09:51  Phos  10.7     02-23  Mg     2.9     02-23    TPro  5.5[L]  /  Alb  2.5[L]  /  TBili  0.4  /  DBili  x   /  AST  43[H]  /  ALT  20  /  AlkPhos  60  02-23    Creatinine Trend: 6.64<--, 3.80<--, 4.65<--, 4.34<--, 2.75<--, 2.49<--  PT/INR - ( 22 Feb 2025 04:17 )   PT: 15.9 sec;   INR: 1.39 ratio         PTT - ( 22 Feb 2025 04:17 )  PTT:35.6 sec  Urinalysis Basic - ( 23 Feb 2025 09:51 )    Color: x / Appearance: x / SG: x / pH: x  Gluc: 114 mg/dL / Ketone: x  / Bili: x / Urobili: x   Blood: x / Protein: x / Nitrite: x   Leuk Esterase: x / RBC: x / WBC x   Sq Epi: x / Non Sq Epi: x / Bacteria: x        Venous Blood Gas:  02-23 @ 12:58  7.17/57/58/21/87.4  VBG Lactate: 1.0  Venous Blood Gas:  02-22 @ 03:12  7.24/58/25/25/30.9  VBG Lactate: 1.6          ====================ASSESSMENT======================  75yo M w/ pmhx HTN, HLD, nonischemic cardiomyopathy, chronic systolic heart failure s/p HM2 LVAD (6/2017), s/p heart transplant from Hep. 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 Found to have septic shock, w/ pseudo in BCx. Worsening NORMAN.  Accepted to MICU for CRRT.    ====================NEUROLOGY=======================  # Metabolic encephalopathy.  - Likely multi-factorial etiologies: uremic vs hypercapnic vs septic    - Patient baseline AOx3. AOx1 at ED arrival, iso of infection vs metabolic derangements (uremic encephelopathy). Currently AOx3 2/23 but lethargic.   - CTH and angio w/o hemorrhage or stenosis   - TSH wnl / B12, CK 2200  - Fall precautions and dysphagia screen  - spot EEG: negative for seizures     Plan  - Neurology recs appreciated   - Pending Infectious workup and possible LP 2/24.      ====================RESPIRATORY======================  # Respiratory acidosis  - VpH 7.17, PCO2 57    Plan  - BIPAP      ====================CARDIOVASCULAR==================  # Hypotension  - Sepsis vs hypovolemic vs cardiac etiology    Plan  - F/u transplant ID recs  - POCUS    # H/O heart transplant.   - Nonischemic cardiomyopathy, chronic systolic heart failure s/p HM2 LVAD (6/2017), s/p heart transplant from Hep. C donor (treated) 2/23/18   - Home Tacrolimus dose 2mg BID, Pred 15mg qd  - Intolerant of Cellcept due to leukopenia.    Plan  - Continue with home Pred 15mg qd  - Hold home Bactrim given hyperkalemia - can consider Atovaquone  - Holding ASA for now; if no more GIB and HgB stable, can re-start  - c/w home Atorvastatin (therapeutic interchange)   - HOLD home Metoprolol from 200mg with hold parameters due to hypotension (02/23)  - Tacro level daily : home regimen 2mg BID, c/w 1mg BID    # Paroxysmal atrial fibrillation.   - Home regimen : Eliquis 5 mg PO BID at home for hx of of afib and IJ thrombi  - EKG on admission - nsr   - [id number]  = 3  - Last Dose : 2/20 AM     Plan  - currently Eliquis holding in anticipation for LP 2/24.    # Hypertension (chronic)    Plan  - Hold home Toprol  mg PO QD ISO hypotension  - Hold home Losartan 75 mg PO QD due to NORMAN & hypotension.    # Vascular disorder of lower extremity.   - Extremities appear cool and dry     Plan  - c/w Local wound care by podiatry   - Podiatry recommendations - pending SHANEKA studies, consider vascular evaluation if abnormal.      ====================/RENAL========================  NORMAN on CKD.   - Known hx of CKD 3, Cr 2.4 uptrending  - Scr ranged from 1.3-2.1 in 2023. Most recent Scr was 1.75 on 1/30/24. On admission, Scr was 1.9 and is worsening now. UA showed trace ketones.   - No improvement with IVF, likely ATN i/s/o contrast/medications.     Plan  - Cath placement in preparation for CRRT  - Hold home ARB for now   - Euvolemic - will hold home Bumex 0.5mg  - c/w renvela for hyperphosphatemia.    #Hyperkalemia (resolved)   - K 6.2 at ED arrival  - shifted in the ED, no EKG changes consistent with severe hyper K. Cr not significantly elevated form baseline, c/f RTA from bactrim and Tacro. Stable   - s/p Lokelma 10mg BID for 3 days    Plan  - Hold home ARB  - f/u Tacro level daily   - Hold home bactrim - ID consult regarding Atovaquone   - c/w monitoring patient on Tele.      ====================GI/NUTRITION=====================    # Coffee ground emesis  - 1 episode of coffee ground emesis overnight  - unclear if true GIB  - Hb stable   - CTAP (2/22) ileus vs partial SBO - cannot r/o GI bleed    Plan  - monitor for further episodes  - c/w PPI IV 40 BID   - c/w trending CBC Q12H  - Holding DVT Ppx/ASA for now; if no more GIB and HgB stable, can re-start  - if repeat episode, GI consult   - If repeat episode or worsening lactate, CT angiogram with venous phase.      ====================SKIN=============================  #Swollen arm   - RUE swollen with hx of IJ thrombus, possible infiltrated IV. LUE now swollen. + Pulses, and no motor deficits   - lower concern for compartment syndrome  - RUE Duplex without thrombus     Plan  - c/w derm recs regarding bilateral bullae on upper extremities (Likely edema bullae; please see Derm note  - Elevate R arm, warm compresses and arm precautions.      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 No manifest signs of bowel ischemia.      Interval worsening of right lower lobe and right middle lobe atelectasis secondary to elevated right hemidiaphragm when compared to prior CT dated 7/11/2023. Superimposed infection in the collapsed lungs is not excluded    Plan  - Midodrine 5mg TID (2/23-  - c/w Meropenem (2/21- ), Hold off additional doses of Vancomycin (MRSA swab negative)  - c/w home Valganciclovir for ppx renally dosed   - Consider anti-fungal coverage as patient is immunocompromised   - f/u infectious labs - Toxoplasma, EBV, Fungitell, Galactomannan, CMV, Cryptococcus, MRSA, ASCENCION virus, CMV, Crypto, ASCENCION virus  - f/u GI PCR, consider C. Diff testing   - Transplant ID recommendations   - LP pending Monday 2/24  - Warm blanket    ====================ENDOCRINE=======================  # Diabetes type 2.   - A1c 5.8    Plan  - ISS.    ====================HEMATOLOGIC/DVT PPx=============  # Hx of hemolytic anemia  - hx of hemolytic anemia, follows with Dr. Rosario as outpatient    Plan  - c/w Prednisone 15 mg PO QD   - f/u hematology/oncology recs  - Monitor H&H daily.    # DVT ppx  - Hold AC ISO hypotension  - SCD    ====================ETHICS===========================  full code MICU ACCEPT NOTE    CHIEF COMPLAINT: Patient is a 74y old  Male who presents with a chief complaint of Lethargy (23 Feb 2025 12:19)      HPI:  75yo M w/ pmhx HTN, HLD, nonischemic cardiomyopathy, chronic systolic heart failure s/p HM2 LVAD (6/2017), s/p heart transplant from Hep. 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 On the phone, the patient's godbrother mentioned that on 2/18, the patient was in the car with his godbrother and was disoriented asking to get off on the road 4 blocks before his house. In addition, a caretaker stated that when she spoke to Mr. Hameed on the phone at 11am on 2/18, he was doing well. However, when the caretaker visited 2 hours later at 1pm, the patient was disoriented and felt his legs were heavy. This prompted the patient to come to the hospital.     In the ED: Hypothermic 91.4, HR 80, /60. Pt noted to be Hyperkalemic (6.2) with Mixed respiratory and metabolic acidosis pH 7.2. Pt was given Calcium Gluconate 2g, D50/Insulin 5 unit, 40 mg IV lasix, Lokelma 10mg. Vancomycin and Zosyn.     On the floor, pt was receiving midodrine and abx for septic shock. BCx 2/19 positive for pseudo, BCx 2/22 NGTD. Still has episodes of hypothermia. NORMAN worsened. Accepted to MICU for CRRT.         PAST MEDICAL & SURGICAL HISTORY:  HTN      SVT (Supraventricular Tachycardia)      Non-Ischemic Cardiomyopathy  now s/p transplant 2018      GIB (gastrointestinal bleeding)      Hepatitis C virus      DVT of upper extremity (deep vein thrombosis)      Former smoker      HLD (hyperlipidemia)      Knee pain, right      H/O autoimmune hemolytic anemia      H/O hemolytic anemia      Atrial fibrillation      Status post left hip replacement      H/O heart transplant  2/2018          FAMILY HISTORY:  No pertinent family history in first degree relatives        SOCIAL HISTORY:  Lives with godbrother. Ambulates with Cane.    MEDICATIONS  (STANDING):  atorvastatin 10 milliGRAM(s) Oral at bedtime  chlorhexidine 2% Cloths 1 Application(s) Topical daily  cholecalciferol 1000 Unit(s) Oral daily  dextrose 5%. 1000 milliLiter(s) (100 mL/Hr) IV Continuous <Continuous>  dextrose 5%. 1000 milliLiter(s) (50 mL/Hr) IV Continuous <Continuous>  dextrose 50% Injectable 25 Gram(s) IV Push once  dextrose 50% Injectable 12.5 Gram(s) IV Push once  dextrose 50% Injectable 25 Gram(s) IV Push once  glucagon  Injectable 1 milliGRAM(s) IntraMuscular once  insulin lispro (ADMELOG) corrective regimen sliding scale   SubCutaneous three times a day before meals  insulin lispro (ADMELOG) corrective regimen sliding scale   SubCutaneous at bedtime  meropenem  IVPB 1000 milliGRAM(s) IV Intermittent every 12 hours  meropenem  IVPB      metoprolol succinate  milliGRAM(s) Oral daily  midodrine 5 milliGRAM(s) Oral every 8 hours  nystatin    Suspension 960898 Unit(s) Oral four times a day  pantoprazole  Injectable 40 milliGRAM(s) IV Push two times a day  polyethylene glycol 3350 17 Gram(s) Oral daily  predniSONE   Tablet 15 milliGRAM(s) Oral daily  senna 2 Tablet(s) Oral daily  sevelamer carbonate 1600 milliGRAM(s) Oral every 8 hours  tacrolimus 1 milliGRAM(s) Oral two times a day  valGANciclovir 450 milliGRAM(s) Oral every 48 hours    MEDICATIONS  (PRN):  dextrose Oral Gel 15 Gram(s) Oral once PRN Blood Glucose LESS THAN 70 milliGRAM(s)/deciliter      Allergies    No Known Allergies    Intolerances        REVIEW OF SYSTEMS:  CONSTITUTIONAL: No weakness, fevers or chills  EYES/ENT: No visual changes;  No vertigo or throat pain   NECK: No pain or stiffness  RESPIRATORY: No cough, wheezing, hemoptysis; No shortness of breath  CARDIOVASCULAR: No chest pain or palpitations  GASTROINTESTINAL: No abdominal or epigastric pain. No nausea, vomiting, or hematemesis; No diarrhea or constipation. No melena or hematochezia.  GENITOURINARY: No dysuria, frequency or hematuria  NEUROLOGICAL: No numbness or weakness  SKIN: No itching, rashes      OBJECTIVE:  ICU Vital Signs Last 24 Hrs  T(C): 33.3 (23 Feb 2025 12:01), Max: 37.3 (23 Feb 2025 05:20)  T(F): 92 (23 Feb 2025 12:01), Max: 99.1 (23 Feb 2025 05:20)  HR: 73 (23 Feb 2025 10:48) (73 - 91)  BP: 93/62 (23 Feb 2025 10:48) (84/57 - 102/69)  BP(mean): --  ABP: --  ABP(mean): --  RR: 18 (23 Feb 2025 10:48) (18 - 20)  SpO2: 97% (23 Feb 2025 10:48) (95% - 100%)    O2 Parameters below as of 23 Feb 2025 10:48  Patient On (Oxygen Delivery Method): room air              02-22 @ 07:01  -  02-23 @ 07:00  --------------------------------------------------------  IN: 0 mL / OUT: 25 mL / NET: -25 mL      CAPILLARY BLOOD GLUCOSE      POCT Blood Glucose.: 135 mg/dL (23 Feb 2025 11:31)      PHYSICAL EXAM:  Constitutional: lethargic, laying in bed, arousable  HEENT: NC/AT, PERRLA, EOMI, no conjunctival pallor or scleral icterus, MMM  Neck: Supple, no JVD  Respiratory: CTA B/L. No w/r/r.   Cardiovascular: RRR, normal S1 and S2, no m/r/g.   Gastrointestinal: mildly distended, +BS, slight tenderness to palpation in the LUQ, LLQ, no guarding or rebound tenderness, no palpable masses  Extremities: wwp; no cyanosis, clubbing or edema. 2-3 bullae present on arms bilaterally.  Neurological: AAOx2 (self, place), no CN deficits, strength and sensation intact throughout.   Skin: 2-3 bullae present on arms bilaterally. Dry, flaking skin on lower extremities.      LABS:                        11.2   11.87 )-----------( 209      ( 23 Feb 2025 09:51 )             36.4     Hgb Trend: 11.2<--, 5.9<--, 12.1<--, 12.9<--, 12.5<--  02-23    140  |  101  |  106[H]  ----------------------------<  114[H]  4.4   |  20[L]  |  6.64[H]    Ca    8.5      23 Feb 2025 09:51  Phos  10.7     02-23  Mg     2.9     02-23    TPro  5.5[L]  /  Alb  2.5[L]  /  TBili  0.4  /  DBili  x   /  AST  43[H]  /  ALT  20  /  AlkPhos  60  02-23    Creatinine Trend: 6.64<--, 3.80<--, 4.65<--, 4.34<--, 2.75<--, 2.49<--  PT/INR - ( 22 Feb 2025 04:17 )   PT: 15.9 sec;   INR: 1.39 ratio         PTT - ( 22 Feb 2025 04:17 )  PTT:35.6 sec  Urinalysis Basic - ( 23 Feb 2025 09:51 )    Color: x / Appearance: x / SG: x / pH: x  Gluc: 114 mg/dL / Ketone: x  / Bili: x / Urobili: x   Blood: x / Protein: x / Nitrite: x   Leuk Esterase: x / RBC: x / WBC x   Sq Epi: x / Non Sq Epi: x / Bacteria: x        Venous Blood Gas:  02-23 @ 12:58  7.17/57/58/21/87.4  VBG Lactate: 1.0  Venous Blood Gas:  02-22 @ 03:12  7.24/58/25/25/30.9  VBG Lactate: 1.6        ====================ASSESSMENT======================  75yo M w/ pmhx HTN, HLD, nonischemic cardiomyopathy, chronic systolic heart failure s/p HM2 LVAD (6/2017), s/p heart transplant from Hep. 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 Found to have septic shock, (2/19 BCx pseudo, 2/22 BCx NGTD). Still episodes of hypotension. Worsening NORMAN. Accepted to MICU for CRRT.      ====================NEUROLOGY=======================  # Metabolic encephalopathy.  - Likely multi-factorial etiologies: uremic vs hypercapnic vs septic    - Patient baseline AOx3. AOx1 at ED arrival, iso of infection vs metabolic derangements (uremic encephelopathy). Currently AOx3 2/23 but lethargic.   - CTH and angio w/o hemorrhage or stenosis   - TSH wnl / B12, CK 2200  - Fall precautions and dysphagia screen  - spot EEG: negative for seizures     Plan  - Neurology recs appreciated   - Pending Infectious workup and possible LP 2/24.      ====================RESPIRATORY======================  # Respiratory acidosis  - VpH 7.17, PCO2 57    Plan  - BIPAP      ====================CARDIOVASCULAR==================  # Hypotension  - Sepsis vs hypovolemic vs cardiac etiology    Plan  - F/u transplant ID recs  - POCUS    # H/O heart transplant.   - Nonischemic cardiomyopathy, chronic systolic heart failure s/p HM2 LVAD (6/2017), s/p heart transplant from Hep. C donor (treated) 2/23/18   - Home Tacrolimus dose 2mg BID, Pred 15mg qd  - Intolerant of Cellcept due to leukopenia.    Plan  - Continue with home Pred 15mg qd  - Hold home Bactrim given hyperkalemia - can consider Atovaquone  - Holding ASA for now; if no more GIB and HgB stable, can re-start  - c/w home Atorvastatin (therapeutic interchange)   - HOLD home Metoprolol from 200mg with hold parameters due to hypotension (02/23)  - Tacro level daily : home regimen 2mg BID, c/w 1mg BID    # Paroxysmal atrial fibrillation.   - Home regimen : Eliquis 5 mg PO BID at home for hx of of afib and IJ thrombi  - EKG on admission - nsr   - [id number]  = 3  - Last Dose : 2/20 AM     Plan  - currently Eliquis holding in anticipation for LP 2/24.    # Hypertension (chronic)    Plan  - Hold home Toprol  mg PO QD ISO hypotension  - Hold home Losartan 75 mg PO QD due to NORMAN & hypotension.    # Vascular disorder of lower extremity.   - Extremities appear cool and dry     Plan  - c/w Local wound care by podiatry   - Podiatry recommendations - pending SHANEKA studies, consider vascular evaluation if abnormal.      ====================/RENAL========================  NORMAN on CKD.   - Known hx of CKD 3, Cr 2.4 uptrending  - Scr ranged from 1.3-2.1 in 2023. Most recent Scr was 1.75 on 1/30/24. On admission, Scr was 1.9 and is worsening now. UA showed trace ketones.   - No improvement with IVF, likely ATN i/s/o contrast/medications.     Plan  - Cath placement in preparation for CRRT  - Hold home ARB for now   - Euvolemic - will hold home Bumex 0.5mg  - c/w renvela for hyperphosphatemia.    #Hyperkalemia (resolved)   - K 6.2 at ED arrival  - shifted in the ED, no EKG changes consistent with severe hyper K. Cr not significantly elevated form baseline, c/f RTA from bactrim and Tacro. Stable   - s/p Lokelma 10mg BID for 3 days    Plan  - Hold home ARB  - f/u Tacro level daily   - Hold home bactrim - ID consult regarding Atovaquone   - c/w monitoring patient on Tele.      ====================GI/NUTRITION=====================    # Coffee ground emesis  - 1 episode of coffee ground emesis overnight  - unclear if true GIB  - Hb stable   - CTAP (2/22) ileus vs partial SBO - cannot r/o GI bleed    Plan  - monitor for further episodes  - c/w PPI IV 40 BID   - c/w trending CBC Q12H  - Holding DVT Ppx/ASA for now; if no more GIB and HgB stable, can re-start  - if repeat episode, GI consult   - If repeat episode or worsening lactate, CT angiogram with venous phase.      ====================SKIN=============================  #Swollen arm   - RUE swollen with hx of IJ thrombus, possible infiltrated IV. LUE now swollen. + Pulses, and no motor deficits   - lower concern for compartment syndrome  - RUE Duplex without thrombus     Plan  - c/w derm recs regarding bilateral bullae on upper extremities (Likely edema bullae; please see Derm note  - Elevate R arm, warm compresses and arm precautions.      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 No manifest signs of bowel ischemia.      Interval worsening of right lower lobe and right middle lobe atelectasis secondary to elevated right hemidiaphragm when compared to prior CT dated 7/11/2023. Superimposed infection in the collapsed lungs is not excluded    Plan  - Midodrine 5mg TID (2/23-  - c/w Meropenem (2/21- ), Hold off additional doses of Vancomycin (MRSA swab negative)  - c/w home Valganciclovir for ppx renally dosed   - Consider anti-fungal coverage as patient is immunocompromised   - f/u infectious labs - Toxoplasma, EBV, Fungitell, Galactomannan, CMV, Cryptococcus, MRSA, ASCENCION virus, CMV, Crypto, ASCENCION virus  - f/u GI PCR, consider C. Diff testing   - Transplant ID recommendations   - LP pending Monday 2/24  - Warm blanket    ====================ENDOCRINE=======================  # Diabetes type 2.   - A1c 5.8    Plan  - ISS.    ====================HEMATOLOGIC/DVT PPx=============  # Hx of hemolytic anemia  - hx of hemolytic anemia, follows with Dr. Rosario as outpatient    Plan  - c/w Prednisone 15 mg PO QD   - f/u hematology/oncology recs  - Monitor H&H daily.    # DVT ppx  - Hold AC ISO hypotension  - SCD    ====================ETHICS===========================  full code MICU ACCEPT NOTE    CHIEF COMPLAINT: Patient is a 74y old  Male who presents with a chief complaint of Lethargy (23 Feb 2025 12:19)      HPI:  75yo M w/ pmhx HTN, HLD, nonischemic cardiomyopathy, chronic systolic heart failure s/p HM2 LVAD (6/2017), s/p heart transplant from Hep. C donor (treated) 2/23/18 (post op course complicated by graft dysfunction treated by plasmapheresis, IVIG, and rituximab), on tacrolimus, nicole syndrome (on prednisone), post transplant pAF/AFl on eliquis, presenting to the hospital with altered mental status. On the phone, the patient's godbrother mentioned that on 2/18, the patient was in the car with his godbrother and was disoriented asking to get off on the road 4 blocks before his house. In addition, a caretaker stated that when she spoke to Mr. Hameed on the phone at 11am on 2/18, he was doing well. However, when the caretaker visited 2 hours later at 1pm, the patient was disoriented and felt his legs were heavy. This prompted the patient to come to the hospital.     In the ED: Hypothermic 91.4, HR 80, /60. Pt noted to be Hyperkalemic (6.2) with Mixed respiratory and metabolic acidosis pH 7.2. Pt was given Calcium Gluconate 2g, D50/Insulin 5 unit, 40 mg IV lasix, Lokelma 10mg. Vancomycin and Zosyn.     On the floor, pt was receiving midodrine and abx for septic shock. BCx 2/19 positive for pseudo, BCx 2/22 NGTD. Still has episodes of hypothermia. NORMAN worsened. Accepted to MICU for CRRT.         PAST MEDICAL & SURGICAL HISTORY:  HTN      SVT (Supraventricular Tachycardia)      Non-Ischemic Cardiomyopathy  now s/p transplant 2018      GIB (gastrointestinal bleeding)      Hepatitis C virus      DVT of upper extremity (deep vein thrombosis)      Former smoker      HLD (hyperlipidemia)      Knee pain, right      H/O autoimmune hemolytic anemia      H/O hemolytic anemia      Atrial fibrillation      Status post left hip replacement      H/O heart transplant  2/2018          FAMILY HISTORY:  No pertinent family history in first degree relatives        SOCIAL HISTORY:  Lives with godbrother. Ambulates with Cane.    MEDICATIONS  (STANDING):  atorvastatin 10 milliGRAM(s) Oral at bedtime  chlorhexidine 2% Cloths 1 Application(s) Topical daily  cholecalciferol 1000 Unit(s) Oral daily  dextrose 5%. 1000 milliLiter(s) (100 mL/Hr) IV Continuous <Continuous>  dextrose 5%. 1000 milliLiter(s) (50 mL/Hr) IV Continuous <Continuous>  dextrose 50% Injectable 25 Gram(s) IV Push once  dextrose 50% Injectable 12.5 Gram(s) IV Push once  dextrose 50% Injectable 25 Gram(s) IV Push once  glucagon  Injectable 1 milliGRAM(s) IntraMuscular once  insulin lispro (ADMELOG) corrective regimen sliding scale   SubCutaneous three times a day before meals  insulin lispro (ADMELOG) corrective regimen sliding scale   SubCutaneous at bedtime  meropenem  IVPB 1000 milliGRAM(s) IV Intermittent every 12 hours  meropenem  IVPB      metoprolol succinate  milliGRAM(s) Oral daily  midodrine 5 milliGRAM(s) Oral every 8 hours  nystatin    Suspension 914786 Unit(s) Oral four times a day  pantoprazole  Injectable 40 milliGRAM(s) IV Push two times a day  polyethylene glycol 3350 17 Gram(s) Oral daily  predniSONE   Tablet 15 milliGRAM(s) Oral daily  senna 2 Tablet(s) Oral daily  sevelamer carbonate 1600 milliGRAM(s) Oral every 8 hours  tacrolimus 1 milliGRAM(s) Oral two times a day  valGANciclovir 450 milliGRAM(s) Oral every 48 hours    MEDICATIONS  (PRN):  dextrose Oral Gel 15 Gram(s) Oral once PRN Blood Glucose LESS THAN 70 milliGRAM(s)/deciliter      Allergies    No Known Allergies    Intolerances        REVIEW OF SYSTEMS:  CONSTITUTIONAL: No weakness, fevers or chills  EYES/ENT: No visual changes;  No vertigo or throat pain   NECK: No pain or stiffness  RESPIRATORY: No cough, wheezing, hemoptysis; No shortness of breath  CARDIOVASCULAR: No chest pain or palpitations  GASTROINTESTINAL: No abdominal or epigastric pain. No nausea, vomiting, or hematemesis; No diarrhea or constipation. No melena or hematochezia.  GENITOURINARY: No dysuria, frequency or hematuria  NEUROLOGICAL: No numbness or weakness  SKIN: No itching, rashes      OBJECTIVE:  ICU Vital Signs Last 24 Hrs  T(C): 33.3 (23 Feb 2025 12:01), Max: 37.3 (23 Feb 2025 05:20)  T(F): 92 (23 Feb 2025 12:01), Max: 99.1 (23 Feb 2025 05:20)  HR: 73 (23 Feb 2025 10:48) (73 - 91)  BP: 93/62 (23 Feb 2025 10:48) (84/57 - 102/69)  BP(mean): --  ABP: --  ABP(mean): --  RR: 18 (23 Feb 2025 10:48) (18 - 20)  SpO2: 97% (23 Feb 2025 10:48) (95% - 100%)    O2 Parameters below as of 23 Feb 2025 10:48  Patient On (Oxygen Delivery Method): room air              02-22 @ 07:01  -  02-23 @ 07:00  --------------------------------------------------------  IN: 0 mL / OUT: 25 mL / NET: -25 mL      CAPILLARY BLOOD GLUCOSE      POCT Blood Glucose.: 135 mg/dL (23 Feb 2025 11:31)      PHYSICAL EXAM:  Constitutional: lethargic, laying in bed, arousable  HEENT: NC/AT, PERRLA, EOMI, no conjunctival pallor or scleral icterus, MMM  Neck: Supple, no JVD  Respiratory: CTA B/L. No w/r/r.   Cardiovascular: RRR, normal S1 and S2, no m/r/g.   Gastrointestinal: mildly distended, +BS, slight tenderness to palpation in the LUQ, LLQ, no guarding or rebound tenderness, no palpable masses  Extremities: wwp; no cyanosis, clubbing or edema. 2-3 bullae present on arms bilaterally.  Neurological: AAOx2 (self, place), no CN deficits, strength and sensation intact throughout.   Skin: 2-3 bullae present on arms bilaterally. Dry, flaking skin on lower extremities.      LABS:                        11.2   11.87 )-----------( 209      ( 23 Feb 2025 09:51 )             36.4     Hgb Trend: 11.2<--, 5.9<--, 12.1<--, 12.9<--, 12.5<--  02-23    140  |  101  |  106[H]  ----------------------------<  114[H]  4.4   |  20[L]  |  6.64[H]    Ca    8.5      23 Feb 2025 09:51  Phos  10.7     02-23  Mg     2.9     02-23    TPro  5.5[L]  /  Alb  2.5[L]  /  TBili  0.4  /  DBili  x   /  AST  43[H]  /  ALT  20  /  AlkPhos  60  02-23    Creatinine Trend: 6.64<--, 3.80<--, 4.65<--, 4.34<--, 2.75<--, 2.49<--  PT/INR - ( 22 Feb 2025 04:17 )   PT: 15.9 sec;   INR: 1.39 ratio         PTT - ( 22 Feb 2025 04:17 )  PTT:35.6 sec  Urinalysis Basic - ( 23 Feb 2025 09:51 )    Color: x / Appearance: x / SG: x / pH: x  Gluc: 114 mg/dL / Ketone: x  / Bili: x / Urobili: x   Blood: x / Protein: x / Nitrite: x   Leuk Esterase: x / RBC: x / WBC x   Sq Epi: x / Non Sq Epi: x / Bacteria: x        Venous Blood Gas:  02-23 @ 12:58  7.17/57/58/21/87.4  VBG Lactate: 1.0  Venous Blood Gas:  02-22 @ 03:12  7.24/58/25/25/30.9  VBG Lactate: 1.6        ====================ASSESSMENT======================  75yo M w/ pmhx HTN, HLD, nonischemic cardiomyopathy, chronic systolic heart failure s/p HM2 LVAD (6/2017), s/p heart transplant from Hep. 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 Found to have septic shock, (2/19 BCx pseudo, 2/22 BCx NGTD). Still episodes of hypotension. Worsening NORMAN. Accepted to MICU for CRRT.      ====================NEUROLOGY=======================  # Metabolic encephalopathy.  - Likely multi-factorial etiologies: uremic vs hypercapnic vs septic    - Patient baseline AOx3. AOx1 at ED arrival, iso of infection vs metabolic derangements (uremic encephelopathy). Currently AOx3 2/23 but lethargic.   - CTH and angio w/o hemorrhage or stenosis   - TSH wnl / B12, CK 2200  - Fall precautions and dysphagia screen  - spot EEG: negative for seizures     Plan  - Neurology recs appreciated   - Pending Infectious workup and possible LP 2/24.      ====================RESPIRATORY======================  # Respiratory acidosis  - VpH 7.17, PCO2 57    Plan  - BIPAP      ====================CARDIOVASCULAR==================  # Hypotension  - Sepsis vs hypovolemic vs cardiac etiology    Plan  - F/u transplant ID recs  - POCUS    # H/O heart transplant.   - Nonischemic cardiomyopathy, chronic systolic heart failure s/p HM2 LVAD (6/2017), s/p heart transplant from Hep. C donor (treated) 2/23/18   - Home Tacrolimus dose 2mg BID, Pred 15mg qd  - Intolerant of Cellcept due to leukopenia.    Plan  - Continue with home Pred 15mg qd  - Hold home Bactrim given hyperkalemia - can consider Atovaquone  - Holding ASA for now; if no more GIB and HgB stable, can re-start  - c/w home Atorvastatin (therapeutic interchange)   - HOLD home Metoprolol from 200mg with hold parameters due to hypotension (02/23)  - Tacro level daily : home regimen 2mg BID, c/w 1mg BID    # Paroxysmal atrial fibrillation.   - Home regimen : Eliquis 5 mg PO BID at home for hx of of afib and IJ thrombi  - EKG on admission - nsr   - LMCN2OTDP  = 3  - Last Dose : 2/20 AM     Plan  - currently Eliquis holding in anticipation for LP 2/24.    # Hypertension (chronic)    Plan  - Hold home Toprol  mg PO QD ISO hypotension  - Hold home Losartan 75 mg PO QD due to NORMAN & hypotension.    # Vascular disorder of lower extremity.   - Extremities appear cool and dry     Plan  - c/w Local wound care by podiatry   - Podiatry recommendations - pending SHANEKA studies, consider vascular evaluation if abnormal.      ====================/RENAL========================  NORMAN on CKD.   - Known hx of CKD 3, Cr 2.4 uptrending  - Scr ranged from 1.3-2.1 in 2023. Most recent Scr was 1.75 on 1/30/24. On admission, Scr was 1.9 and is worsening now. UA showed trace ketones.   - No improvement with IVF, likely ATN i/s/o contrast/medications.     Plan  - Cath placement in preparation for CRRT  - Hold home ARB for now   - Euvolemic - will hold home Bumex 0.5mg  - c/w renvela for hyperphosphatemia.    #Hyperkalemia (resolved)   - K 6.2 at ED arrival  - shifted in the ED, no EKG changes consistent with severe hyper K. Cr not significantly elevated form baseline, c/f RTA from bactrim and Tacro. Stable   - s/p Lokelma 10mg BID for 3 days    Plan  - Hold home ARB  - f/u Tacro level daily   - Hold home bactrim - ID consult regarding Atovaquone   - c/w monitoring patient on Tele.      ====================GI/NUTRITION=====================    # Coffee ground emesis  - 1 episode of coffee ground emesis overnight  - unclear if true GIB  - Hb stable   - CTAP (2/22) ileus vs partial SBO - cannot r/o GI bleed    Plan  - monitor for further episodes  - c/w PPI IV 40 BID   - c/w trending CBC Q12H  - Holding DVT Ppx/ASA for now; if no more GIB and HgB stable, can re-start  - if repeat episode, GI consult   - If repeat episode or worsening lactate, CT angiogram with venous phase.      ====================SKIN=============================  #Swollen arm   - RUE swollen with hx of IJ thrombus, possible infiltrated IV. LUE now swollen. + Pulses, and no motor deficits   - lower concern for compartment syndrome  - RUE Duplex without thrombus     Plan  - c/w derm recs regarding bilateral bullae on upper extremities (Likely edema bullae; please see Derm note  - Elevate R arm, warm compresses and arm precautions.      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 No manifest signs of bowel ischemia.      Interval worsening of right lower lobe and right middle lobe atelectasis secondary to elevated right hemidiaphragm when compared to prior CT dated 7/11/2023. Superimposed infection in the collapsed lungs is not excluded    Plan  - Midodrine 5mg TID (2/23-  - c/w Meropenem (2/21- ), Hold off additional doses of Vancomycin (MRSA swab negative)  - c/w home Valganciclovir for ppx renally dosed   - f/u infectious labs - Toxoplasma, EBV, Fungitell, Galactomannan, CMV, Cryptococcus, MRSA, ASCENCION virus, CMV, Crypto, ASCENCION virus  - f/u GI PCR, consider C. Diff testing   - Transplant ID recommendations   - LP pending Monday 2/24  - Warm blanket    ====================ENDOCRINE=======================  # Diabetes type 2.   - A1c 5.8    Plan  - ISS.    ====================HEMATOLOGIC/DVT PPx=============  # Hx of hemolytic anemia  - hx of hemolytic anemia, follows with Dr. Rosario as outpatient    Plan  - c/w Prednisone 15 mg PO QD   - f/u hematology/oncology recs  - Monitor H&H daily.    # DVT ppx  - Hold AC ISO hypotension  - SCD    ====================ETHICS===========================  full code    Attending Attestation:    Patient seen and examined with resident/fellow.  Agree with above except as noted.     73 yo ,male with nonischemic cardiomyopathy s/p heart transplant 2018, with Nicole syndrome  admitted to hospital with lethargy and elevated wbc. Pt developed ileus during hospital course. Poor po intake. Pt has becoming more hypotensive. Pt also with worsening renal failure with creatine above 6 today. Base line 2-3. Pt also found to have pseudomonas bacteremia  and started on  meropenem.  Pt also with acute metabolic encephelopathy. Likely from sepsis, and uremia.  Also with combined respiratory and metabolic acidosis. Pt is on immunosuppressive therapy for heart transplant.    1. Severe sepsis with septic shock form pseudomonas bacteremia. POCUS reveals no evidence of  fluid overload. Pt is also likely intravascular depleted with ileus and poor po intake. Will give 1 liter LR ans start LR at 150cc/hr. Repeat labs in 3 hours from bolus.  May need to start pressors if does not respond to fluids. No midodrine with ileus.    2. ID. Pseudomonas bacteremia. Source most likely GI with ileus. Continue meropenem. Continue  with home Valganciclovir.          Thoughts of meningitis seem less likely at this moment.   3.Renal; Acute on chronic renal failure from hypovolemia and sever sepsis with hypotension. Trial of fluids as above. May need CVVHD.  4.Pulmonary.  VBG showing combined metabolic and respiratory acidosis. ABG shows predominantly metabolic acidosis.                        NGT placed to low suction to remove possible GI fluids. NIPPV as needed. Does need at his time.                         5. Acute metabolic encephelopathy multifactorial: Severe sepsis, renal failure with uremia. Meningitis a lot lower on DDX.   6. GI Ileus. NGT placed to low intermittent suction.  7. Cardiac. Heart transplant receipent. LV systolic function normal with septal bowing.  Continue tacrolimus as per transplant cards. Check Tacro level in am                   Continue steroids for  Nicole Syndrome. May need stress steroids                  H/O atrial fib on Eliquis. Eliquis on hold for possible LP tomorrow. Don't think pt needs LP at this time. Will consider restarting IV heparin after HD catheter placed if needed.  8. DVT prophylaxis SCD. May restart IV heparin later.  9. GOC; Full code.    This patient is critically ill and required frequent bedside visits with therapy change.  Total critical care time spent was 110 minutes. Excluding teaching time and procedures.    Korey Bonilla MD.

## 2025-02-23 NOTE — PROVIDER CONTACT NOTE (OTHER) - RECOMMENDATIONS
Contact acp
Notify provider.
500cc bolus LR, stat cbc. rectal temp
Contact acp
provider made aware, will continue to monitor

## 2025-02-23 NOTE — PROVIDER CONTACT NOTE (CRITICAL VALUE NOTIFICATION) - RECOMMENDATIONS
provider made aware, continue antibiotics, will continue to monitor
provider made aware, continue antibiotics, will continue to monitor
provider made aware, MICU consulted

## 2025-02-24 ENCOUNTER — APPOINTMENT (OUTPATIENT)
Dept: ENDOCRINOLOGY | Facility: CLINIC | Age: 75
End: 2025-02-24

## 2025-02-24 ENCOUNTER — LABORATORY RESULT (OUTPATIENT)
Age: 75
End: 2025-02-24

## 2025-02-24 NOTE — PROGRESS NOTE ADULT - ASSESSMENT
72yo M pmhx HTN, HLD, CKD, nonischemic cardiomyopathy, chronic systolic heart failure s/p HM2 LVAD (6/2017), s/p heart transplant from Hep. 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 Superimposed infection in the collapsed lungs is not excluded.    CT abdomen pelvis (February 22) Since prior study 2/22/2025 interval development of diffusely dilated small bowel loops with air-fluid levels. Contrast from prior study has passed into the colon. Findings are favored to represent ileus or less likely partial small bowel obstruction rather than mechanical obstruction. No manifest signs of bowel ischemia.    At this point suspect that Pseudomonas bacteremia was secondary to either a respiratory source or gastrointestinal translocation.  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    #Pseudomonas Bacteremia, Encephalopathy, Heart Transplant Recipient, Prophylactic Antibiotic  --Stop Meropenem   --Recommend Zosyn 3.375g IV Q8H  --Recommend Atovaquone 1,500 mg PO Q24H for PCP PPx  --If persisting encephalopathy will need to consider LP, at present favor That etiology of his encephalopathy is secondary to uremia  --Continue Valcyte 450 mg M/W/F for CMV PPx  --Continue to follow CBC with diff  --Continue to follow transaminases  --Continue to follow temperature curve  --Follow up on preliminary blood cultures    I will continue to follow. Please feel free to contact me with any further questions.    Gianluca Loving M.D.  [facility] Division of Infectious Disease  8AM-5PM Monday - Friday: Available on [employer] Teams  After Hours and Holidays (or if no response on [employer] Teams): Please contact the Infectious Diseases Office at [phone]    The above assessment and plan were discussed with medicine resident

## 2025-02-24 NOTE — PROGRESS NOTE ADULT - SUBJECTIVE AND OBJECTIVE BOX
[facility] Division of Kidney Diseases & Hypertension  FOLLOW UP NOTE  [phone]--------------------------------------------------------------------------------  Chief Complaint:Hyperkalemia, NORMAN on CKD.     24 hour events/subjective:  Pt was seen and examined earlier today. Pt was moved to MICU and CRRT was started on 2/23  ROS is limited as the patient is not fully oriented.        PAST HISTORY  --------------------------------------------------------------------------------  No significant changes to PMH, PSH, FHx, SHx, unless otherwise noted    ALLERGIES & MEDICATIONS  --------------------------------------------------------------------------------  Allergies    No Known Allergies    Intolerances      Standing Inpatient Medications  aspirin  chewable 81 milliGRAM(s) Oral daily  atorvastatin 10 milliGRAM(s) Oral at bedtime  chlorhexidine 2% Cloths 1 Application(s) Topical daily  cholecalciferol 1000 Unit(s) Oral daily  CRRT Treatment    <Continuous>  glucagon  Injectable 1 milliGRAM(s) IntraMuscular once  heparin   Injectable 5000 Unit(s) SubCutaneous every 8 hours  insulin lispro (ADMELOG) corrective regimen sliding scale   SubCutaneous every 6 hours  meropenem  IVPB 500 milliGRAM(s) IV Intermittent every 8 hours  norepinephrine Infusion 0.05 MICROgram(s)/kG/Min IV Continuous <Continuous>  nystatin    Suspension 453392 Unit(s) Oral four times a day  pantoprazole  Injectable 40 milliGRAM(s) IV Push two times a day  polyethylene glycol 3350 17 Gram(s) Oral daily  predniSONE   Tablet 15 milliGRAM(s) Oral daily  PrismaSATE Dialysate BGK 4 / 2.5 5000 milliLiter(s) CRRT <Continuous>  PrismaSOL Filtration BGK 4 / 2.5 5000 milliLiter(s) CRRT <Continuous>  PrismaSOL Filtration BGK 4 / 2.5 5000 milliLiter(s) CRRT <Continuous>  senna 2 Tablet(s) Oral daily  sevelamer carbonate 1600 milliGRAM(s) Oral every 8 hours  tacrolimus 1 milliGRAM(s) Oral two times a day  valGANciclovir 450 milliGRAM(s) Oral <User Schedule>    PRN Inpatient Medications      REVIEW OF SYSTEMS  --------------------------------------------------------------------------------  ROS is limited due to current clinical status.     VITALS/PHYSICAL EXAM  --------------------------------------------------------------------------------  T(C): 36.1 (02-24-25 @ 08:00), Max: 36.1 (02-24-25 @ 08:00)  HR: 97 (02-24-25 @ 11:00) (79 - 97)  BP: 123/58 (02-24-25 @ 11:00) (86/40 - 156/69)  RR: 19 (02-24-25 @ 11:00) (13 - 26)  SpO2: 100% (02-24-25 @ 11:00) (91% - 100%)  Wt(kg): --        02-23-25 @ 07:01  -  02-24-25 @ 07:00  --------------------------------------------------------  IN: 2675.5 mL / OUT: 403 mL / NET: 2272.5 mL    02-24-25 @ 07:01  -  02-24-25 @ 11:25  --------------------------------------------------------  IN: 241.7 mL / OUT: 244 mL / NET: -2.3 mL      Physical Exam:  Gen: NAD  Pulm: CTA B/L  CV: S1S2  Abd: Soft, +BS   Ext: No LE edema B/L  Neuro: AAO x1-2 - Interval improvement in mental status.   Skin: Warm and dry  Lfemoral non- tunnelled HD cath in place - being used for CRRT.     LABS/STUDIES  --------------------------------------------------------------------------------              9.9    10.98 >-----------<  193      [02-23-25 @ 22:40]              31.8     136  |  100  |  96  ----------------------------<  118      [02-24-25 @ 06:39]  4.3   |  20  |  5.32        Ca     7.8     [02-24-25 @ 06:39]      Mg     2.5     [02-24-25 @ 06:39]      Phos  6.2     [02-24-25 @ 06:39]    TPro  5.3  /  Alb  2.5  /  TBili  0.4  /  DBili  x   /  AST  36  /  ALT  17  /  AlkPhos  54  [02-24-25 @ 06:39]    PT/INR: PT 12.9 , INR 1.12       [02-23-25 @ 22:40]  PTT: 32.4       [02-23-25 @ 22:40]      Creatinine Trend:  SCr 5.32 [02-24 @ 06:39]  SCr 6.89 [02-23 @ 22:40]  SCr 6.68 [02-23 @ 15:39]  SCr 6.64 [02-23 @ 09:51]  SCr 3.80 [02-23 @ 07:08]      TSH 2.57      [02-19-25 @ 17:28]  Lipid: chol 95, TG 65, HDL 55, LDL --      [02-20-25 @ 05:35]      Syphilis Screen (Treponema Pallidum Ab) Negative      [02-21-25 @ 09:38]

## 2025-02-24 NOTE — PROGRESS NOTE ADULT - ATTENDING COMMENTS
Abrupt NORMAN noted  started CRRT  ATN vs AIN most likely  Rule out tumor lysis given significant hyperphosphatemia- high catabolic state- check uric acid and LDH today  Patient with blood culture growing GNR identified as pseudomonas koreensis   on abx now  dose adjustment for valganciclovir and meropenem with CRRT  consider LP    d/.w with ICU    Sarkis Hutchins MD  Office   Contact me directly via Microsoft Teams     (After 5 pm or on weekends please page the on-call fellow/attending, can check [email or web address] for schedule. Login is andreia murrieta, schedule under [facility] medicine, psych, derm)

## 2025-02-24 NOTE — PROGRESS NOTE ADULT - SUBJECTIVE AND OBJECTIVE BOX
Medications:  aspirin  chewable 81 milliGRAM(s) Oral daily  atorvastatin 10 milliGRAM(s) Oral at bedtime  atovaquone  Suspension 1500 milliGRAM(s) Oral daily  chlorhexidine 2% Cloths 1 Application(s) Topical daily  cholecalciferol 1000 Unit(s) Oral daily  CRRT Treatment    <Continuous>  glucagon  Injectable 1 milliGRAM(s) IntraMuscular once  heparin   Injectable 5000 Unit(s) SubCutaneous every 8 hours  insulin lispro (ADMELOG) corrective regimen sliding scale   SubCutaneous every 6 hours  norepinephrine Infusion 0.05 MICROgram(s)/kG/Min IV Continuous <Continuous>  nystatin    Suspension 475107 Unit(s) Oral four times a day  pantoprazole  Injectable 40 milliGRAM(s) IV Push two times a day  piperacillin/tazobactam IVPB.. 3.375 Gram(s) IV Intermittent every 8 hours  polyethylene glycol 3350 17 Gram(s) Oral daily  predniSONE   Tablet 15 milliGRAM(s) Oral daily  PrismaSATE Dialysate BGK 4 / 2.5 5000 milliLiter(s) CRRT <Continuous>  PrismaSOL Filtration BGK 4 / 2.5 5000 milliLiter(s) CRRT <Continuous>  PrismaSOL Filtration BGK 4 / 2.5 5000 milliLiter(s) CRRT <Continuous>  senna 2 Tablet(s) Oral daily  sevelamer carbonate 1600 milliGRAM(s) Oral every 8 hours  tacrolimus 1 milliGRAM(s) Oral two times a day  valGANciclovir 450 milliGRAM(s) Oral <User Schedule>      Physical Exam:    Vitals:  Vital Signs Last 24 Hours  T(C): 36.6 (02-24-25 @ 16:00), Max: 36.6 (02-24-25 @ 16:00)  HR: 101 (02-24-25 @ 16:00) (84 - 101)  BP: 114/55 (02-24-25 @ 16:00) (86/40 - 156/69)  RR: 18 (02-24-25 @ 16:00) (13 - 21)  SpO2: 100% (02-24-25 @ 16:00) (91% - 100%)        I&O's Summary    23 Feb 2025 07:01  -  24 Feb 2025 07:00  --------------------------------------------------------  IN: 2675.5 mL / OUT: 403 mL / NET: 2272.5 mL    24 Feb 2025 07:01  -  24 Feb 2025 16:51  --------------------------------------------------------  IN: 611.7 mL / OUT: 594 mL / NET: 17.7 mL    Labs:                        9.9    10.98 )-----------( 193      ( 23 Feb 2025 22:40 )             31.8     02-24    136  |  101  |  68[H]  ----------------------------<  106[H]  4.4   |  21[L]  |  3.86[H]    Ca    7.7[L]      24 Feb 2025 11:49  Phos  4.2     02-24  Mg     2.5     02-24    TPro  5.3[L]  /  Alb  2.6[L]  /  TBili  0.5  /  DBili  x   /  AST  38  /  ALT  17  /  AlkPhos  49  02-24    PT/INR - ( 23 Feb 2025 22:40 )   PT: 12.9 sec;   INR: 1.12 ratio         PTT - ( 23 Feb 2025 22:40 )  PTT:32.4 sec

## 2025-02-24 NOTE — PROGRESS NOTE ADULT - ATTENDING COMMENTS
73 yo male with nonischemic cardiomyopathy s/p heart transplant 2018, with Nicole syndrome  , Afib (on Eliquis), right IJ thrombus admitted to hospital with lethargy and elevated wbc. Pt developed ileus during hospital course. Poor po intake. Pt has becoming more hypotensive. Pt also with worsening renal failure with creatine above 6, Baseline 2-3. Pt also found to have pseudomonas bacteremia  and started on  meropenem.  Pt also with acute metabolic encephalopathy likely from sepsis and uremia.  Also with combined respiratory and metabolic acidosis. Pt is on immunosuppressive therapy for heart transplant.    #Cardiovascular, Heart transplant, Afib  - Severe sepsis with septic shock form pseudomonas bacteremia  - levophed requirements decreasing  - Heart transplant recipient. LV systolic function normal with septal bowing.  Continue tacrolimus as per transplant cards, monitor tacro level  - remains on Prednisone   - Eliquis / Hep gtt on hold d/t drop in Hb  #ID. Pseudomonas bacteremia. Source most likely GI with ileus. Continue meropenem. Continue  with home Valganciclovir.   - meningitis seem less likely at this time; CT chest with RLL consolidation   - discuss with ID initiating Atovaquone as Bactrim ppx stopped i/s/o NORMAN  #Renal; Acute on chronic renal failure d/t ATN from hypovolemia and septic shock.   - started on CRRT overnight (Shiley in Left Fem)  - lytes and HCO3 improving with CRRT, no LA  #Pulmonary.  combined metabolic and respiratory acidosis improving as mental status improves and with CRRT   #Neuro - Acute metabolic encephelopathy multifactorial: Severe sepsis, renal failure with uremia. Meningitis not suspected at this time  - following simple commands  #GI Ileus.   - large BM overnight, hypoactive BS and tympanitic to percussion  - NGT placed to low intermittent suction, will repeat Abd XR and determine need for further decompression  #Heme - Nicole Syndrome. May need stress steroids   - continue Pred 15mg  - monitor H/H, stable at ~10 now  #Derm - skin blisters d/t hypervolemia, seen by Dermatology  #DVT prophylaxis SCD., can start HQS and if Hb stable then start full AC with Hep gtt  #GOC; Full code.

## 2025-02-24 NOTE — PROGRESS NOTE ADULT - ASSESSMENT
72yo M pmhx HTN, HLD, nonischemic cardiomyopathy, chronic systolic heart failure s/p HM2 LVAD (6/2017), s/p heart transplant from Hep. 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 Pt here with change in MS, hyperkalemia and norman.   Patient was given Calcium Gluconate 2g, D50/Insulin 5 unit, 40 mg IV lasix, Lokelma 10mg.   Vancomycin and Zosyn       Hyperkalemia:  Pt with K level of 6.7 that improved to 6.1 after medical management with additional dose of insulin given.   K 5.6 initially - On Lokelma 10 TID.   On CRRT now. Serum K is better now.   Monitor labs.     Stage 3 chronic kidney disease: with superimposed NORMAN:  Patient with known history of CKD. Upon HIE review, Scr ranged from 1.3-2.1 in 2023. Most recent Scr was 1.75 on 1/30/24.   Scr is worsening now.  2.49 --->2.75--> 4.34--> 4.65---> 6.64 -->6.68 ---> 6.89.  pt was started on CRRT 2/23  CPK is down trending. - Pt denies any seizure before coming to hospital. (Though not reliable history)  NORMAN likely in setting of ? underlying infection related and hemodynamic mediated ATN. Acute phosphate nephropathy cant be ruled out.   Transplant ID reccs appreciated.   Pt has respiratory acidosis as well along with metabolic acidosis.   neurology is on board.     PLAN:  C/w CRRT.   Phos binders work with PO diet only. - Phos level is 6.2 ( improving with CRRT)  Resp acidosis management as per primary team.   Pt has urine ketones - check BHB.   Monitor labs, daily wts and I/Os.   Check tacro trough daily. - today 8.0  Check urine lytes.  on empirical Abx. Infectious work up as per primary team.   Dose medications as per eGFR for now.       Called pt's care taker - Tahira Quintero - explained his condition from nephrology stand point. She verbalized her understanding and consented for hemodialysis/ CRRT, if needed.   CRRT was initiated on 2/23 72yo M pmhx HTN, HLD, nonischemic cardiomyopathy, chronic systolic heart failure s/p HM2 LVAD (6/2017), s/p heart transplant from Hep. 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 Pt here with change in MS, hyperkalemia and norman.   Patient was given Calcium Gluconate 2g, D50/Insulin 5 unit, 40 mg IV lasix, Lokelma 10mg.   Vancomycin and Zosyn       Hyperkalemia:  Pt with K level of 6.7 that improved to 6.1 after medical management with additional dose of insulin given.   K 5.6 initially - On Lokelma 10 TID.   On CRRT now. Serum K is better now.   Monitor labs.     Stage 3 chronic kidney disease: with superimposed NORMAN:  Patient with known history of CKD. Upon HIE review, Scr ranged from 1.3-2.1 in 2023. Most recent Scr was 1.75 on 1/30/24.   Scr is worsening now.  2.49 --->2.75--> 4.34--> 4.65---> 6.64 -->6.68 ---> 6.89.  pt was started on CRRT 2/23  CPK is down trending. - Pt denies any seizure before coming to hospital. (Though not reliable history)  NORMAN likely in setting of ? underlying infection related and hemodynamic mediated ATN.  TLS needs to be ruled out  Transplant ID reccs appreciated.   Pt has respiratory acidosis as well along with metabolic acidosis.   neurology is on board.     PLAN:  C/w CRRT.   Phos binders work with PO diet only. - Phos level is 6.2 ( improving with CRRT)  Resp acidosis management as per primary team.   Pt has urine ketones - check BHB.   Monitor labs, daily wts and I/Os.   Check tacro trough daily. - today 8.0  Check urine lytes.  on empirical Abx. Infectious work up as per primary team.   Dose medications as per eGFR for now.       Called pt's care taker - Tahira Quintero - explained his condition from nephrology stand point. She verbalized her understanding and consented for hemodialysis/ CRRT, if needed.   CRRT was initiated on 2/23

## 2025-02-24 NOTE — ADVANCED PRACTICE NURSE CONSULT - ASSESSMENT
Arrived on 5 MICU on 2/24/25. Patient was awake, but able to engage in light conversation verbally. Able to nod his head yes / no to questions.     Examination of the LEFT arm reveals complete epidermal exposure due to marked edema/swelling. Measuring approximately 17.0 cm x 13.0 cm x 0.0 cm. No skin tears or other discrete wounds are noted within the area of exposed epidermis. Peripheral pulses are palpable. Sensation is intact in the affected extremity. Drainage amount: moderate serous. The exposed epidermis was dressed with Adaptic Touch semi-permeable dressing and covered with a dry protective dressing. Elevation of the LEFT arm is recommended to help reduce swelling.     When the consult was completed, patient's bed lowered to safe position, with side rails up, and call bell within reach. Discussed plan of care with DIXON Del Rio.

## 2025-02-24 NOTE — PROGRESS NOTE ADULT - CRITICAL CARE ATTENDING COMMENT
74yrs.   7 years post transplant followed by myself.   Major post transplant issues include:  LAD-RV fistual with coronary dilation. No ammenable to intervention.   LV dysfunction which normalized on GDMT.   Autoimmune hemolytic anemia and thrombocytopenia follow by Dr Rosario on steroids (15mg)   Osteoperosis.   Off Cellcept due to leukopenia  History of serious infections Klebs sepsis, severe cdiff, CMV- evero d/c for that reason.   Last annual one year ago: Normal stress test. LVEF 65%.   Last DSAs May 2024 unremarkable. Last allosure Nov 2024 < .04  March 2023 Viral pneumonia.   May 2023: Falls and volume overload.   June 2023: hypoglycemia due to inapprop insulin.   Last admission admission Jan 2024 for hypovolemia and NORMAN.   Patient does much better when he has a home health aid and recently lost his aid.   Seen by me Feb 12th- stable clinical course.  home meds; prograf 2 bid (goal 4-6), pred 15, eloquis 5 bid (afib), nystatin,  asa, bactrim tiw, statin, toprol 200 off diuretics. losartan 75 and valtessa. 74yrs.   7 years post transplant followed by myself.   Major post transplant issues include:  LAD-RV fistual with coronary dilation. No ammenable to intervention.   LV dysfunction which normalized on GDMT.   Autoimmune hemolytic anemia and thrombocytopenia follow by Dr Rosario on steroids (15mg)   Osteoperosis.   Off Cellcept due to leukopenia  History of serious infections Klebs sepsis, severe cdiff, CMV- evero d/c for that reason.   Last annual one year ago: Normal stress test. LVEF 65%.   Last DSAs May 2024 unremarkable. Last allosure Nov 2024 < .04  March 2023 Viral pneumonia.   May 2023: Falls and volume overload.   June 2023: hypoglycemia due to inapprop insulin.   Last admission admission Jan 2024 for hypovolemia and NORMAN.   Patient does much better when he has a home health aid and recently lost his aid.   Seen by me Feb 12th- stable clinical course.  home meds; prograf 2 bid (goal 4-6), pred 15, eloquis 5 bid (afib), nystatin,  asa, bactrim tiw, statin, toprol 200 off diuretics. losartan 75 and valtessa.  Admitted 2.20 altered MS.   Hypothermia, hyperkalemia, elevated WBC 22K   CTA head, CT abdo pelvis unremarkable.   BC 2.19 PseudomonasX1. Repeat 2.22 negative.   Admitted to MICU. IV antibiotics. Transient pressors. CVVHD.   Now: off pressors. on CVVHD.   meds: statin, mepron, s/q heparin, insulin, dayne to zocyn, pred 15, prograf 1 bid (8, 8.7, 5), valctye 450 biw,   HR 90, 120/-130/ afebrile.   02-24    135  |  101  |  57[H]  ----------------------------<  109[H]  4.5   |  21[L]  |  3.19[H]    Ca    8.0[L]      24 Feb 2025 17:31  Phos  3.7     02-24  Mg     2.5     02-24    TPro  5.5[L]  /  Alb  2.7[L]  /  TBili  0.5  /  DBili  x   /  AST  36  /  ALT  16  /  AlkPhos  53  02-24                        9.9    10.98 )-----------( 193      ( 23 Feb 2025 22:40 )             31.8   CMV 2.22 -ve  Critically ill patient with gram -ve sepsis  Suggest:   Continue supportive care.   Central line vs transduce HD catheter for CVP and central sat  TTE   Target prograf 4-6  Change pred to solucortef 75 tid.   Antibiotics as per transplant   Genaro Campbell

## 2025-02-24 NOTE — PROGRESS NOTE ADULT - SUBJECTIVE AND OBJECTIVE BOX
INTERVAL HPI/OVERNIGHT EVENTS:  Pt seen and examined at bedside. No acute overnight events or complaints.    VITAL SIGNS:  T(F): 96.8 (02-24-25 @ 04:00)  HR: 93 (02-24-25 @ 07:00)  BP: 102/55 (02-24-25 @ 07:00)  RR: 16 (02-24-25 @ 07:00)  SpO2: 95% (02-24-25 @ 07:00)  Wt(kg): --    PHYSICAL EXAM:    Constitutional: lethargic, laying in bed, arousable  HEENT: NC/AT, PERRLA, EOMI, no conjunctival pallor or scleral icterus, MMM  Neck: Supple, no JVD  Respiratory: CTA B/L. No w/r/r.   Cardiovascular: RRR, normal S1 and S2, no m/r/g.   Gastrointestinal: mildly distended, +BS, slight tenderness to palpation in the LUQ, LLQ, no guarding or rebound tenderness, no palpable masses  Extremities: wwp; no cyanosis, clubbing or edema. 2-3 bullae present on arms bilaterally.  Neurological: AAOx2 (self, place), no CN deficits, strength and sensation intact throughout.   Skin: 2-3 bullae present on arms bilaterally. Dry, flaking skin on lower extremities.    MEDICATIONS  (STANDING):  atorvastatin 10 milliGRAM(s) Oral at bedtime  chlorhexidine 2% Cloths 1 Application(s) Topical daily  cholecalciferol 1000 Unit(s) Oral daily  CRRT Treatment    <Continuous>  glucagon  Injectable 1 milliGRAM(s) IntraMuscular once  insulin lispro (ADMELOG) corrective regimen sliding scale   SubCutaneous every 6 hours  lactated ringers. 1000 milliLiter(s) (250 mL/Hr) IV Continuous <Continuous>  meropenem  IVPB 500 milliGRAM(s) IV Intermittent every 8 hours  norepinephrine Infusion 0.05 MICROgram(s)/kG/Min (8.5 mL/Hr) IV Continuous <Continuous>  nystatin    Suspension 254081 Unit(s) Oral four times a day  pantoprazole  Injectable 40 milliGRAM(s) IV Push two times a day  polyethylene glycol 3350 17 Gram(s) Oral daily  predniSONE   Tablet 15 milliGRAM(s) Oral daily  PrismaSATE Dialysate BGK 4 / 2.5 5000 milliLiter(s) (1200 mL/Hr) CRRT <Continuous>  PrismaSOL Filtration BGK 4 / 2.5 5000 milliLiter(s) (1000 mL/Hr) CRRT <Continuous>  PrismaSOL Filtration BGK 4 / 2.5 5000 milliLiter(s) (200 mL/Hr) CRRT <Continuous>  senna 2 Tablet(s) Oral daily  sevelamer carbonate 1600 milliGRAM(s) Oral every 8 hours  tacrolimus 1 milliGRAM(s) Oral two times a day  valGANciclovir 450 milliGRAM(s) Oral <User Schedule>    MEDICATIONS  (PRN):      Allergies    No Known Allergies    Intolerances        LABS:                        9.9    10.98 )-----------( 193      ( 23 Feb 2025 22:40 )             31.8     02-24    136  |  100  |  96[H]  ----------------------------<  118[H]  4.3   |  20[L]  |  5.32[H]    Ca    7.8[L]      24 Feb 2025 06:39  Phos  6.2     02-24  Mg     2.5     02-24    TPro  5.3[L]  /  Alb  2.5[L]  /  TBili  0.4  /  DBili  x   /  AST  36  /  ALT  17  /  AlkPhos  54  02-24    PT/INR - ( 23 Feb 2025 22:40 )   PT: 12.9 sec;   INR: 1.12 ratio         PTT - ( 23 Feb 2025 22:40 )  PTT:32.4 sec  Urinalysis Basic - ( 24 Feb 2025 06:39 )    Color: x / Appearance: x / SG: x / pH: x  Gluc: 118 mg/dL / Ketone: x  / Bili: x / Urobili: x   Blood: x / Protein: x / Nitrite: x   Leuk Esterase: x / RBC: x / WBC x   Sq Epi: x / Non Sq Epi: x / Bacteria: x        RADIOLOGY & ADDITIONAL TESTS:  Reviewed      ******************  Authored By: Marquise Bhardwaj MD PGY1  Internal Medicine  MS Teams Preferred  ******************   INTERVAL HPI/OVERNIGHT EVENTS:  Pt seen and examined at bedside. No acute overnight events or complaints. This AM patient responsive, but altered. Had difficulty answering his name, but was able to correctly respond after some prompting. He does not know where he is, or what year it is. He does understand why he's admitted. He denies pain, bleeding or any other complaints.    VITAL SIGNS:  T(F): 96.8 (02-24-25 @ 04:00)  HR: 93 (02-24-25 @ 07:00)  BP: 102/55 (02-24-25 @ 07:00)  RR: 16 (02-24-25 @ 07:00)  SpO2: 95% (02-24-25 @ 07:00)  Wt(kg): --    PHYSICAL EXAM:    Constitutional: lethargic, laying in bed, arousable  HEENT: NC/AT, PERRLA, EOMI, no conjunctival pallor or scleral icterus, MMM  Neck: Supple, no JVD  Respiratory: CTA B/L. No w/r/r.   Cardiovascular: RRR, normal S1 and S2, no m/r/g.   Gastrointestinal: mildly distended, +BS, slight tenderness to palpation in the LUQ, LLQ, no guarding or rebound tenderness, no palpable masses  Extremities: RUE edematous; no cyanosis, clubbing or edema. 2-3 bullae present on arms bilaterally.  Neurological: AAOx1 (self, place), no CN deficits, strength and sensation intact throughout.   Skin: 2-3 bullae present on arms bilaterally. Dry, flaking skin on lower extremities.    MEDICATIONS  (STANDING):  atorvastatin 10 milliGRAM(s) Oral at bedtime  chlorhexidine 2% Cloths 1 Application(s) Topical daily  cholecalciferol 1000 Unit(s) Oral daily  CRRT Treatment    <Continuous>  glucagon  Injectable 1 milliGRAM(s) IntraMuscular once  insulin lispro (ADMELOG) corrective regimen sliding scale   SubCutaneous every 6 hours  lactated ringers. 1000 milliLiter(s) (250 mL/Hr) IV Continuous <Continuous>  meropenem  IVPB 500 milliGRAM(s) IV Intermittent every 8 hours  norepinephrine Infusion 0.05 MICROgram(s)/kG/Min (8.5 mL/Hr) IV Continuous <Continuous>  nystatin    Suspension 562194 Unit(s) Oral four times a day  pantoprazole  Injectable 40 milliGRAM(s) IV Push two times a day  polyethylene glycol 3350 17 Gram(s) Oral daily  predniSONE   Tablet 15 milliGRAM(s) Oral daily  PrismaSATE Dialysate BGK 4 / 2.5 5000 milliLiter(s) (1200 mL/Hr) CRRT <Continuous>  PrismaSOL Filtration BGK 4 / 2.5 5000 milliLiter(s) (1000 mL/Hr) CRRT <Continuous>  PrismaSOL Filtration BGK 4 / 2.5 5000 milliLiter(s) (200 mL/Hr) CRRT <Continuous>  senna 2 Tablet(s) Oral daily  sevelamer carbonate 1600 milliGRAM(s) Oral every 8 hours  tacrolimus 1 milliGRAM(s) Oral two times a day  valGANciclovir 450 milliGRAM(s) Oral <User Schedule>    MEDICATIONS  (PRN):      Allergies    No Known Allergies    Intolerances        LABS:                        9.9    10.98 )-----------( 193      ( 23 Feb 2025 22:40 )             31.8     02-24    136  |  100  |  96[H]  ----------------------------<  118[H]  4.3   |  20[L]  |  5.32[H]    Ca    7.8[L]      24 Feb 2025 06:39  Phos  6.2     02-24  Mg     2.5     02-24    TPro  5.3[L]  /  Alb  2.5[L]  /  TBili  0.4  /  DBili  x   /  AST  36  /  ALT  17  /  AlkPhos  54  02-24    PT/INR - ( 23 Feb 2025 22:40 )   PT: 12.9 sec;   INR: 1.12 ratio         PTT - ( 23 Feb 2025 22:40 )  PTT:32.4 sec  Urinalysis Basic - ( 24 Feb 2025 06:39 )    Color: x / Appearance: x / SG: x / pH: x  Gluc: 118 mg/dL / Ketone: x  / Bili: x / Urobili: x   Blood: x / Protein: x / Nitrite: x   Leuk Esterase: x / RBC: x / WBC x   Sq Epi: x / Non Sq Epi: x / Bacteria: x        RADIOLOGY & ADDITIONAL TESTS:  Reviewed      ******************  Authored By: Marquise Bhardwaj MD PGY1  Internal Medicine  MS Teams Preferred  ******************

## 2025-02-24 NOTE — CHART NOTE - NSCHARTNOTEFT_GEN_A_CORE
MICU Transfer Note    Transfer from: MICU  Transfer to:  (  ) Medicine    (  ) Telemetry    (  ) RCU    (  ) Palliative    (  ) Stroke Unit    (X) CICU  Accepting physician:    HPI:  73yo M w/ pmhx HTN, HLD, nonischemic cardiomyopathy, chronic systolic heart failure s/p HM2 LVAD (6/2017), s/p heart transplant from Hep. 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 On the phone, the patient's godbrother mentioned that on 2/18, the patient was in the car with his godbrother and was disoriented asking to get off on the road 4 blocks before his house. In addition, a caretaker stated that when she spoke to Mr. Hameed on the phone at 11am on 2/18, he was doing well. However, when the caretaker visited 2 hours later at 1pm, the patient was disoriented and felt his legs were heavy. This prompted the patient to come to the hospital.     In the ED: Hypothermic 91.4, HR 80, /60. Pt noted to be Hyperkalemic (6.2) with Mixed respiratory and metabolic acidosis pH 7.2. Pt was given Calcium Gluconate 2g, D50/Insulin 5 unit, 40 mg IV lasix, Lokelma 10mg. Vancomycin and Zosyn.     On the floor, pt was receiving midodrine vanc, zosyn for septic shock. BCx 2/19 positive for pseudo, BCx 2/22 NGTD. NORMAN worsened. Patient persistently altered and meropenem started. Accepted to MICU for CRRT. L fem vein double-tunneled catheter placed.      INTERVAL EVENTS/MICU COURSE: CVVHD was started per nephrology. Patient required Levophed and albumin due to hypotension with CVVHD. NGT was placed to LIS for patient's ileus. Patient with a large BM. Patient's ASA and DVT ppx restarted given no           Admit to MICU for CRRT.  O/N: CVVHD started Shiley placed - started on norepinephrine albumin x 1 given POCUS a line predominant no effusions noted difficult cardiac windows IVC collapsible  NG tube to LWS  blood cultures resent albumin x 1   2/24: persistent AMS, ileus and RLL consolidation on CT, restart ASA and SC heparin, repeat abdominal imaging, dc fluids     FOR FOLLOW UP:    MEDICATIONS:  STANDING MEDICATIONS  aspirin  chewable 81 milliGRAM(s) Oral daily  atorvastatin 10 milliGRAM(s) Oral at bedtime  chlorhexidine 2% Cloths 1 Application(s) Topical daily  cholecalciferol 1000 Unit(s) Oral daily  CRRT Treatment    <Continuous>  glucagon  Injectable 1 milliGRAM(s) IntraMuscular once  heparin   Injectable 5000 Unit(s) SubCutaneous every 8 hours  insulin lispro (ADMELOG) corrective regimen sliding scale   SubCutaneous every 6 hours  meropenem  IVPB 500 milliGRAM(s) IV Intermittent every 8 hours  norepinephrine Infusion 0.05 MICROgram(s)/kG/Min IV Continuous <Continuous>  nystatin    Suspension 895532 Unit(s) Oral four times a day  pantoprazole  Injectable 40 milliGRAM(s) IV Push two times a day  polyethylene glycol 3350 17 Gram(s) Oral daily  predniSONE   Tablet 15 milliGRAM(s) Oral daily  PrismaSATE Dialysate BGK 4 / 2.5 5000 milliLiter(s) CRRT <Continuous>  PrismaSOL Filtration BGK 4 / 2.5 5000 milliLiter(s) CRRT <Continuous>  PrismaSOL Filtration BGK 4 / 2.5 5000 milliLiter(s) CRRT <Continuous>  senna 2 Tablet(s) Oral daily  sevelamer carbonate 1600 milliGRAM(s) Oral every 8 hours  tacrolimus 1 milliGRAM(s) Oral two times a day  valGANciclovir 450 milliGRAM(s) Oral <User Schedule>    PRN MEDICATIONS      VITAL SIGNS: Last 24 Hours  T(C): 36.1 (24 Feb 2025 08:00), Max: 36.1 (24 Feb 2025 08:00)  T(F): 97 (24 Feb 2025 08:00), Max: 97 (24 Feb 2025 08:00)  HR: 97 (24 Feb 2025 11:00) (79 - 97)  BP: 123/58 (24 Feb 2025 11:00) (86/40 - 156/69)  BP(mean): 83 (24 Feb 2025 11:00) (58 - 99)  RR: 19 (24 Feb 2025 11:00) (13 - 26)  SpO2: 100% (24 Feb 2025 11:00) (91% - 100%)    LABS:                        9.9    10.98 )-----------( 193      ( 23 Feb 2025 22:40 )             31.8     02-24    136  |  100  |  96[H]  ----------------------------<  118[H]  4.3   |  20[L]  |  5.32[H]    Ca    7.8[L]      24 Feb 2025 06:39  Phos  6.2     02-24  Mg     2.5     02-24    TPro  5.3[L]  /  Alb  2.5[L]  /  TBili  0.4  /  DBili  x   /  AST  36  /  ALT  17  /  AlkPhos  54  02-24    PT/INR - ( 23 Feb 2025 22:40 )   PT: 12.9 sec;   INR: 1.12 ratio         PTT - ( 23 Feb 2025 22:40 )  PTT:32.4 sec  Urinalysis Basic - ( 24 Feb 2025 06:39 )    Color: x / Appearance: x / SG: x / pH: x  Gluc: 118 mg/dL / Ketone: x  / Bili: x / Urobili: x   Blood: x / Protein: x / Nitrite: x   Leuk Esterase: x / RBC: x / WBC x   Sq Epi: x / Non Sq Epi: x / Bacteria: x      ABG - ( 23 Feb 2025 15:22 )  pH, Arterial: 7.24  pH, Blood: x     /  pCO2: 44    /  pO2: 117   / HCO3: 19    / Base Excess: -8.3  /  SaO2: 100.0               Culture - Blood (collected 22 Feb 2025 03:11)  Source: .Blood Blood-Peripheral  Preliminary Report (24 Feb 2025 09:01):    No growth at 48 Hours          RADIOLOGY:    ASSESSMENT & PLAN: MICU Transfer Note    Transfer from: MICU  Transfer to:  (  ) Medicine    (  ) Telemetry    (  ) RCU    (  ) Palliative    (  ) Stroke Unit    (X) CICU  Accepting physician:    HPI:  75yo M w/ pmhx HTN, HLD, nonischemic cardiomyopathy, chronic systolic heart failure s/p HM2 LVAD (6/2017), s/p heart transplant from Hep. 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 On the phone, the patient's godbrother mentioned that on 2/18, the patient was in the car with his godbrother and was disoriented asking to get off on the road 4 blocks before his house. In addition, a caretaker stated that when she spoke to Mr. Hameed on the phone at 11am on 2/18, he was doing well. However, when the caretaker visited 2 hours later at 1pm, the patient was disoriented and felt his legs were heavy. This prompted the patient to come to the hospital.     In the ED: Hypothermic 91.4, HR 80, /60. Pt noted to be Hyperkalemic (6.2) with Mixed respiratory and metabolic acidosis pH 7.2. Pt was given Calcium Gluconate 2g, D50/Insulin 5 unit, 40 mg IV lasix, Lokelma 10mg. Vancomycin and Zosyn.     On the floor, pt was receiving midodrine vanc, zosyn for septic shock. BCx 2/19 positive for pseudo, BCx 2/22 NGTD. NORMAN worsened. Patient persistently altered and meropenem started. Accepted to MICU for CRRT. L fem vein double-tunneled catheter placed.      INTERVAL EVENTS/MICU COURSE: CVVHD was started per nephrology. Patient required Levophed and albumin due to hypotension with CVVHD. NGT was placed to LIS for patient's ileus. Patient with a large BM. Patient's ASA and DVT ppx restarted given no acute source of bleeding. Pending abdominal X-ray given potential improvement in ileus given BM and hypoactive bowel sounds. Fluids stopped.    FOR FOLLOW UP:  [ ] Monitor mental status, if not improving by tomorrow 2/25 with abx and CVVHD --> consider LP per ID  [ ] F/u pending ID labs in specimen received  [ ] Abdominal X-ray - to evaluate ileus, patient with a large BM 2/23 and with hypoactive bowel sounds on exam  [ ] Consider removing NGT based on above  [ ] F/u w/ transplant ID whether patient needs atovaquone, patient on bactrim at home for transplant ppx but was stopped due to ARF  [ ] F/u nephro recs for CVVHD  [ ] Monitor for any GIB, questionable episode of coffee ground emesis earlier in hospitalization, aspirin and DVT ppx started 2/24  [ ] Wean off levo as tolerated  [ ] Holding eliquis, resume when able      MEDICATIONS:  STANDING MEDICATIONS  aspirin  chewable 81 milliGRAM(s) Oral daily  atorvastatin 10 milliGRAM(s) Oral at bedtime  chlorhexidine 2% Cloths 1 Application(s) Topical daily  cholecalciferol 1000 Unit(s) Oral daily  CRRT Treatment    <Continuous>  glucagon  Injectable 1 milliGRAM(s) IntraMuscular once  heparin   Injectable 5000 Unit(s) SubCutaneous every 8 hours  insulin lispro (ADMELOG) corrective regimen sliding scale   SubCutaneous every 6 hours  meropenem  IVPB 500 milliGRAM(s) IV Intermittent every 8 hours  norepinephrine Infusion 0.05 MICROgram(s)/kG/Min IV Continuous <Continuous>  nystatin    Suspension 005608 Unit(s) Oral four times a day  pantoprazole  Injectable 40 milliGRAM(s) IV Push two times a day  polyethylene glycol 3350 17 Gram(s) Oral daily  predniSONE   Tablet 15 milliGRAM(s) Oral daily  PrismaSATE Dialysate BGK 4 / 2.5 5000 milliLiter(s) CRRT <Continuous>  PrismaSOL Filtration BGK 4 / 2.5 5000 milliLiter(s) CRRT <Continuous>  PrismaSOL Filtration BGK 4 / 2.5 5000 milliLiter(s) CRRT <Continuous>  senna 2 Tablet(s) Oral daily  sevelamer carbonate 1600 milliGRAM(s) Oral every 8 hours  tacrolimus 1 milliGRAM(s) Oral two times a day  valGANciclovir 450 milliGRAM(s) Oral <User Schedule>    PRN MEDICATIONS      VITAL SIGNS: Last 24 Hours  T(C): 36.1 (24 Feb 2025 08:00), Max: 36.1 (24 Feb 2025 08:00)  T(F): 97 (24 Feb 2025 08:00), Max: 97 (24 Feb 2025 08:00)  HR: 97 (24 Feb 2025 11:00) (79 - 97)  BP: 123/58 (24 Feb 2025 11:00) (86/40 - 156/69)  BP(mean): 83 (24 Feb 2025 11:00) (58 - 99)  RR: 19 (24 Feb 2025 11:00) (13 - 26)  SpO2: 100% (24 Feb 2025 11:00) (91% - 100%)    LABS:                        9.9    10.98 )-----------( 193      ( 23 Feb 2025 22:40 )             31.8     02-24    136  |  100  |  96[H]  ----------------------------<  118[H]  4.3   |  20[L]  |  5.32[H]    Ca    7.8[L]      24 Feb 2025 06:39  Phos  6.2     02-24  Mg     2.5     02-24    TPro  5.3[L]  /  Alb  2.5[L]  /  TBili  0.4  /  DBili  x   /  AST  36  /  ALT  17  /  AlkPhos  54  02-24    PT/INR - ( 23 Feb 2025 22:40 )   PT: 12.9 sec;   INR: 1.12 ratio         PTT - ( 23 Feb 2025 22:40 )  PTT:32.4 sec  Urinalysis Basic - ( 24 Feb 2025 06:39 )    Color: x / Appearance: x / SG: x / pH: x  Gluc: 118 mg/dL / Ketone: x  / Bili: x / Urobili: x   Blood: x / Protein: x / Nitrite: x   Leuk Esterase: x / RBC: x / WBC x   Sq Epi: x / Non Sq Epi: x / Bacteria: x      ABG - ( 23 Feb 2025 15:22 )  pH, Arterial: 7.24  pH, Blood: x     /  pCO2: 44    /  pO2: 117   / HCO3: 19    / Base Excess: -8.3  /  SaO2: 100.0               Culture - Blood (collected 22 Feb 2025 03:11)  Source: .Blood Blood-Peripheral  Preliminary Report (24 Feb 2025 09:01):    No growth at 48 Hours          RADIOLOGY:    ASSESSMENT & PLAN:    75yo M w/ pmhx HTN, HLD, nonischemic cardiomyopathy, chronic systolic heart failure s/p HM2 LVAD (6/2017), s/p heart transplant from Hep. 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 Found to have septic shock, (2/19 BCx pseudo, 2/22 BCx NGTD). Still episodes of hypotension. Worsening NORMAN. Accepted to MICU for CRRT.      ====================NEUROLOGY=======================  # Metabolic encephalopathy.  - Likely multi-factorial etiologies: septic vs uremic vs hypercapnic, less likely meningitis  - Patient baseline AOx3. AOx1 at ED arrival, iso of infection vs metabolic derangements (uremic encephelopathy). Currently AOx3 2/23 but lethargic.   - CTH and angio w/o hemorrhage or stenosis   - TSH wnl / B12, CK 2200  - Fall precautions  - spot EEG: negative for seizures     Plan  - Monitor MS w/ abx and CRRT  - Consider LP if MS not improving    ====================RESPIRATORY======================  #Metabolic acidosis  - respiratory and metabolic acidosis VpH 7.17, PCO2 57 on admission  - pH improving, likely as respiratory component has improved      ====================CARDIOVASCULAR==================  #Septic shock from pseudomonas bacteremia  - POCUS reveals no evidence of  fluid overload  - Pt is also likely intravascular depleted with ileus and poor po intake.   - S/p IVF  - Patient volume up hold off IVF  - Pressors: levo 0.01    Plan  - F/u transplant ID recs - CMV ordered    # H/O heart transplant.   - Nonischemic cardiomyopathy, chronic systolic heart failure s/p HM2 LVAD (6/2017), s/p heart transplant from Hep. C donor (treated) 2/23/18   - Home Tacrolimus dose 2mg BID, Pred 15mg qd  - Intolerant of Cellcept due to leukopenia.    Plan  - Continue with home Pred 15mg qd  - Hold home Bactrim given hyperkalemia - can consider Atovaquone  - Restart ASA  - c/w home Atorvastatin (therapeutic interchange)   - HOLD home Metoprolol from 200mg with hold parameters due to hypotension (02/23)  - Tacro level daily : home regimen 2mg BID, c/w 1mg BID    # Paroxysmal atrial fibrillation.   - Home regimen : Eliquis 5 mg PO BID at home for hx of of afib and IJ thrombi  - EKG on admission - nsr   - [id number]  = 3  - Last Dose : 2/20 AM     Plan  - Hold eliquis    # Hypertension (chronic)    Plan  - Hold home Toprol  mg PO QD ISO hypotension  - Hold home Losartan 75 mg PO QD due to NORMAN & hypotension.    # Vascular disorder of lower extremity.   - Extremities appear cool and dry     Plan  - c/w Local wound care by podiatry   - Podiatry recommendations - pending SHANEKA studies, consider vascular evaluation if abnormal.    ====================/RENAL========================  NORMAN on CKD  ATN 2/2 septic shock  - Known hx of CKD, Cr now uptrending  - Scr ranged from 1.3-2.1 in 2023. Most recent Scr was 1.75 on 1/30/24. On admission, Scr was 1.9 and is worsening now. UA showed trace ketones.   - No improvement with IVF, likely ATN i/s/o contrast/medications.   - S/p double lumen L fem catheter  - CRRT started    Plan  - Hold home ARB for now   - Euvolemic - will hold home Bumex 0.5mg  - c/w renvela for hyperphosphatemia  - CRRT as per nephro    #Hyperkalemia (resolved)   - K 6.2 at ED arrival  - shifted in the ED, no EKG changes consistent with severe hyper K. Cr not significantly elevated form baseline, c/f RTA from bactrim and Tacro. Stable   - s/p Lokelma 10mg BID for 3 days    Plan  - Hold home ARB  - f/u Tacro level daily   - Hold home bactrim - ID consult regarding Atovaquone   - c/w monitoring patient on Tele.  - CRRT as above      ====================GI/NUTRITION=====================  #Ileus  #+/- coffee ground emesis  - Ileus could potentially be source of infx  - 1 episode of coffee ground emesis, unclear if true GIB  - Hb stable   - CTAP (2/22) ileus vs partial SBO - cannot r/o GI bleed  - NGT placed set to LIS    Plan  - monitor for further episodes  - c/w PPI IV 40 BID   - c/w trending CBC Q12H  - Restart DVT Ppx/ASA for now; if no more GIB and HgB stable  - KUB today to evaluate ileus  - if repeat episode, GI consult   - If repeat episode or worsening lactate, CT angiogram with venous phase.    ====================SKIN=============================  #Swollen arm   - RUE swollen with hx of IJ thrombus, possible infiltrated IV. LUE now swollen. + Pulses, and no motor deficits   - lower concern for compartment syndrome  - RUE Duplex without thrombus     Plan  - c/w derm recs regarding bilateral bullae on upper extremities (Likely edema bullae; please see Derm note  - Elevate R arm, warm compresses and arm precautions.    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 No manifest signs of bowel ischemia.      Interval worsening of right lower lobe and right middle lobe atelectasis secondary to elevated right hemidiaphragm when compared to prior CT dated 7/11/2023. Superimposed infection in the collapsed lungs is not excluded    Plan  - c/w Meropenem (2/21- ), Hold off additional doses of Vancomycin (MRSA swab negative)  - c/w home Valganciclovir for ppx renally dosed   - f/u infectious labs - Toxoplasma, EBV, Fungitell, Galactomannan, CMV, Cryptococcus, MRSA, ASCENCION virus, CMV, Crypto, ASCENCION virus  - f/u GI PCR, consider C. Diff testing   - Transplant ID recommendations   - Consider LP if MS not improving  - Warm blanket    ====================ENDOCRINE=======================  # Diabetes type 2.   - A1c 5.8    Plan  - ISS.    ====================HEMATOLOGIC/DVT PPx=============  # Hx of hemolytic anemia  - hx of hemolytic anemia, follows with Dr. Rosario as outpatient    Plan  - c/w Prednisone 15 mg PO QD   - f/u hematology/oncology recs  - Monitor H&H daily.    # DVT ppx  - restart heparin sq  - SCD    ====================ETHICS===========================  full code    Marquise Bhardwaj MD  Internal Medicine, PGY-1  Teams Preferred MICU Transfer Note    Transfer from: MICU  Transfer to:  (  ) Medicine    (  ) Telemetry    (  ) RCU    (  ) Palliative    (  ) Stroke Unit    (X) CICU  Accepting physician: Edil Santiago    HPI:  73yo M w/ pmhx HTN, HLD, nonischemic cardiomyopathy, chronic systolic heart failure s/p HM2 LVAD (6/2017), s/p heart transplant from Hep. 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 On the phone, the patient's godbrother mentioned that on 2/18, the patient was in the car with his godbrother and was disoriented asking to get off on the road 4 blocks before his house. In addition, a caretaker stated that when she spoke to Mr. Hameed on the phone at 11am on 2/18, he was doing well. However, when the caretaker visited 2 hours later at 1pm, the patient was disoriented and felt his legs were heavy. This prompted the patient to come to the hospital.     In the ED: Hypothermic 91.4, HR 80, /60. Pt noted to be Hyperkalemic (6.2) with Mixed respiratory and metabolic acidosis pH 7.2. Pt was given Calcium Gluconate 2g, D50/Insulin 5 unit, 40 mg IV lasix, Lokelma 10mg. Vancomycin and Zosyn.     On the floor, pt was receiving midodrine vanc, zosyn for septic shock. BCx 2/19 positive for pseudo, BCx 2/22 NGTD. NORMAN worsened. Patient persistently altered and meropenem started. Accepted to MICU for CRRT. L fem vein double-tunneled catheter placed.      INTERVAL EVENTS/MICU COURSE: CVVHD was started per nephrology. Patient required Levophed and albumin due to hypotension with CVVHD. NGT was placed to LIS for patient's ileus. Patient with a large BM. Patient's ASA and DVT ppx restarted given no acute source of bleeding. Pending abdominal X-ray given potential improvement in ileus given BM and hypoactive bowel sounds. Fluids stopped.    FOR FOLLOW UP:  [ ] Monitor mental status, if not improving by tomorrow 2/25 with abx and CVVHD --> consider LP per ID  [ ] F/u pending ID labs in specimen received  [ ] Abdominal X-ray - to evaluate ileus, patient with a large BM 2/23 and with hypoactive bowel sounds on exam  [ ] Consider removing NGT based on above  [ ] F/u w/ transplant ID whether patient needs atovaquone, patient on bactrim at home for transplant ppx but was stopped due to ARF  [ ] F/u nephro recs for CVVHD  [ ] Monitor for any GIB, questionable episode of coffee ground emesis earlier in hospitalization, aspirin and DVT ppx started 2/24  [ ] Wean off levo as tolerated  [ ] Holding eliquis, resume when able      MEDICATIONS:  STANDING MEDICATIONS  aspirin  chewable 81 milliGRAM(s) Oral daily  atorvastatin 10 milliGRAM(s) Oral at bedtime  chlorhexidine 2% Cloths 1 Application(s) Topical daily  cholecalciferol 1000 Unit(s) Oral daily  CRRT Treatment    <Continuous>  glucagon  Injectable 1 milliGRAM(s) IntraMuscular once  heparin   Injectable 5000 Unit(s) SubCutaneous every 8 hours  insulin lispro (ADMELOG) corrective regimen sliding scale   SubCutaneous every 6 hours  meropenem  IVPB 500 milliGRAM(s) IV Intermittent every 8 hours  norepinephrine Infusion 0.05 MICROgram(s)/kG/Min IV Continuous <Continuous>  nystatin    Suspension 710278 Unit(s) Oral four times a day  pantoprazole  Injectable 40 milliGRAM(s) IV Push two times a day  polyethylene glycol 3350 17 Gram(s) Oral daily  predniSONE   Tablet 15 milliGRAM(s) Oral daily  PrismaSATE Dialysate BGK 4 / 2.5 5000 milliLiter(s) CRRT <Continuous>  PrismaSOL Filtration BGK 4 / 2.5 5000 milliLiter(s) CRRT <Continuous>  PrismaSOL Filtration BGK 4 / 2.5 5000 milliLiter(s) CRRT <Continuous>  senna 2 Tablet(s) Oral daily  sevelamer carbonate 1600 milliGRAM(s) Oral every 8 hours  tacrolimus 1 milliGRAM(s) Oral two times a day  valGANciclovir 450 milliGRAM(s) Oral <User Schedule>    PRN MEDICATIONS      VITAL SIGNS: Last 24 Hours  T(C): 36.1 (24 Feb 2025 08:00), Max: 36.1 (24 Feb 2025 08:00)  T(F): 97 (24 Feb 2025 08:00), Max: 97 (24 Feb 2025 08:00)  HR: 97 (24 Feb 2025 11:00) (79 - 97)  BP: 123/58 (24 Feb 2025 11:00) (86/40 - 156/69)  BP(mean): 83 (24 Feb 2025 11:00) (58 - 99)  RR: 19 (24 Feb 2025 11:00) (13 - 26)  SpO2: 100% (24 Feb 2025 11:00) (91% - 100%)    LABS:                        9.9    10.98 )-----------( 193      ( 23 Feb 2025 22:40 )             31.8     02-24    136  |  100  |  96[H]  ----------------------------<  118[H]  4.3   |  20[L]  |  5.32[H]    Ca    7.8[L]      24 Feb 2025 06:39  Phos  6.2     02-24  Mg     2.5     02-24    TPro  5.3[L]  /  Alb  2.5[L]  /  TBili  0.4  /  DBili  x   /  AST  36  /  ALT  17  /  AlkPhos  54  02-24    PT/INR - ( 23 Feb 2025 22:40 )   PT: 12.9 sec;   INR: 1.12 ratio         PTT - ( 23 Feb 2025 22:40 )  PTT:32.4 sec  Urinalysis Basic - ( 24 Feb 2025 06:39 )    Color: x / Appearance: x / SG: x / pH: x  Gluc: 118 mg/dL / Ketone: x  / Bili: x / Urobili: x   Blood: x / Protein: x / Nitrite: x   Leuk Esterase: x / RBC: x / WBC x   Sq Epi: x / Non Sq Epi: x / Bacteria: x      ABG - ( 23 Feb 2025 15:22 )  pH, Arterial: 7.24  pH, Blood: x     /  pCO2: 44    /  pO2: 117   / HCO3: 19    / Base Excess: -8.3  /  SaO2: 100.0               Culture - Blood (collected 22 Feb 2025 03:11)  Source: .Blood Blood-Peripheral  Preliminary Report (24 Feb 2025 09:01):    No growth at 48 Hours          RADIOLOGY:    ASSESSMENT & PLAN:    73yo M w/ pmhx HTN, HLD, nonischemic cardiomyopathy, chronic systolic heart failure s/p HM2 LVAD (6/2017), s/p heart transplant from Hep. 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 Found to have septic shock, (2/19 BCx pseudo, 2/22 BCx NGTD). Still episodes of hypotension. Worsening NORMAN. Accepted to MICU for CRRT.      ====================NEUROLOGY=======================  # Metabolic encephalopathy.  - Likely multi-factorial etiologies: septic vs uremic vs hypercapnic, less likely meningitis  - Patient baseline AOx3. AOx1 at ED arrival, iso of infection vs metabolic derangements (uremic encephelopathy). Currently AOx3 2/23 but lethargic.   - CTH and angio w/o hemorrhage or stenosis   - TSH wnl / B12, CK 2200  - Fall precautions  - spot EEG: negative for seizures     Plan  - Monitor MS w/ abx and CRRT  - Consider LP if MS not improving    ====================RESPIRATORY======================  #Metabolic acidosis  - respiratory and metabolic acidosis VpH 7.17, PCO2 57 on admission  - pH improving, likely as respiratory component has improved      ====================CARDIOVASCULAR==================  #Septic shock from pseudomonas bacteremia  - POCUS reveals no evidence of  fluid overload  - Pt is also likely intravascular depleted with ileus and poor po intake.   - S/p IVF  - Patient volume up hold off IVF  - Pressors: levo 0.01    Plan  - F/u transplant ID recs - CMV ordered    # H/O heart transplant.   - Nonischemic cardiomyopathy, chronic systolic heart failure s/p HM2 LVAD (6/2017), s/p heart transplant from Hep. C donor (treated) 2/23/18   - Home Tacrolimus dose 2mg BID, Pred 15mg qd  - Intolerant of Cellcept due to leukopenia.    Plan  - Continue with home Pred 15mg qd  - Hold home Bactrim given hyperkalemia - can consider Atovaquone  - Restart ASA  - c/w home Atorvastatin (therapeutic interchange)   - HOLD home Metoprolol from 200mg with hold parameters due to hypotension (02/23)  - Tacro level daily : home regimen 2mg BID, c/w 1mg BID    # Paroxysmal atrial fibrillation.   - Home regimen : Eliquis 5 mg PO BID at home for hx of of afib and IJ thrombi  - EKG on admission - nsr   - [id number]  = 3  - Last Dose : 2/20 AM     Plan  - Hold eliquis    # Hypertension (chronic)    Plan  - Hold home Toprol  mg PO QD ISO hypotension  - Hold home Losartan 75 mg PO QD due to NORMAN & hypotension.    # Vascular disorder of lower extremity.   - Extremities appear cool and dry     Plan  - c/w Local wound care by podiatry   - Podiatry recommendations - pending SHANEKA studies, consider vascular evaluation if abnormal.    ====================/RENAL========================  NORMAN on CKD  ATN 2/2 septic shock  - Known hx of CKD, Cr now uptrending  - Scr ranged from 1.3-2.1 in 2023. Most recent Scr was 1.75 on 1/30/24. On admission, Scr was 1.9 and is worsening now. UA showed trace ketones.   - No improvement with IVF, likely ATN i/s/o contrast/medications.   - S/p double lumen L fem catheter  - CRRT started    Plan  - Hold home ARB for now   - Euvolemic - will hold home Bumex 0.5mg  - c/w renvela for hyperphosphatemia  - CRRT as per nephro    #Hyperkalemia (resolved)   - K 6.2 at ED arrival  - shifted in the ED, no EKG changes consistent with severe hyper K. Cr not significantly elevated form baseline, c/f RTA from bactrim and Tacro. Stable   - s/p Lokelma 10mg BID for 3 days    Plan  - Hold home ARB  - f/u Tacro level daily   - Hold home bactrim - ID consult regarding Atovaquone   - c/w monitoring patient on Tele.  - CRRT as above      ====================GI/NUTRITION=====================  #Ileus  #+/- coffee ground emesis  - Ileus could potentially be source of infx  - 1 episode of coffee ground emesis, unclear if true GIB  - Hb stable   - CTAP (2/22) ileus vs partial SBO - cannot r/o GI bleed  - NGT placed set to LIS    Plan  - monitor for further episodes  - c/w PPI IV 40 BID   - c/w trending CBC Q12H  - Restart DVT Ppx/ASA for now; if no more GIB and HgB stable  - KUB today to evaluate ileus  - if repeat episode, GI consult   - If repeat episode or worsening lactate, CT angiogram with venous phase.    ====================SKIN=============================  #Swollen arm   - RUE swollen with hx of IJ thrombus, possible infiltrated IV. LUE now swollen. + Pulses, and no motor deficits   - lower concern for compartment syndrome  - RUE Duplex without thrombus     Plan  - c/w derm recs regarding bilateral bullae on upper extremities (Likely edema bullae; please see Derm note  - Elevate R arm, warm compresses and arm precautions.    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 No manifest signs of bowel ischemia.      Interval worsening of right lower lobe and right middle lobe atelectasis secondary to elevated right hemidiaphragm when compared to prior CT dated 7/11/2023. Superimposed infection in the collapsed lungs is not excluded    Plan  - c/w Meropenem (2/21- ), Hold off additional doses of Vancomycin (MRSA swab negative)  - c/w home Valganciclovir for ppx renally dosed   - f/u infectious labs - Toxoplasma, EBV, Fungitell, Galactomannan, CMV, Cryptococcus, MRSA, ASCENCION virus, CMV, Crypto, ASCENCION virus  - f/u GI PCR, consider C. Diff testing   - Transplant ID recommendations   - Consider LP if MS not improving  - Warm blanket    ====================ENDOCRINE=======================  # Diabetes type 2.   - A1c 5.8    Plan  - ISS.    ====================HEMATOLOGIC/DVT PPx=============  # Hx of hemolytic anemia  - hx of hemolytic anemia, follows with Dr. Rosario as outpatient    Plan  - c/w Prednisone 15 mg PO QD   - f/u hematology/oncology recs  - Monitor H&H daily.    # DVT ppx  - restart heparin sq  - SCD    ====================ETHICS===========================  full code    Marquise Bhardwaj MD  Internal Medicine, PGY-1  Teams Preferred MICU Transfer Note    Transfer from: MICU  Transfer to:  (  ) Medicine    (  ) Telemetry    (  ) RCU    (  ) Palliative    (  ) Stroke Unit    (X) CICU  Accepting physician: Edil Santiago  Sign Out given to Grayson BRAVO    HPI:  75yo M w/ pmhx HTN, HLD, nonischemic cardiomyopathy, chronic systolic heart failure s/p HM2 LVAD (6/2017), s/p heart transplant from Hep. 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 On the phone, the patient's godbrother mentioned that on 2/18, the patient was in the car with his godbrother and was disoriented asking to get off on the road 4 blocks before his house. In addition, a caretaker stated that when she spoke to Mr. Hameed on the phone at 11am on 2/18, he was doing well. However, when the caretaker visited 2 hours later at 1pm, the patient was disoriented and felt his legs were heavy. This prompted the patient to come to the hospital.     In the ED: Hypothermic 91.4, HR 80, /60. Pt noted to be Hyperkalemic (6.2) with Mixed respiratory and metabolic acidosis pH 7.2. Pt was given Calcium Gluconate 2g, D50/Insulin 5 unit, 40 mg IV lasix, Lokelma 10mg. Vancomycin and Zosyn.     On the floor, pt was receiving midodrine vanc, zosyn for septic shock. BCx 2/19 positive for pseudo, BCx 2/22 NGTD. NORMAN worsened. Patient persistently altered and meropenem started. Accepted to MICU for CRRT. L fem vein double-tunneled catheter placed.      INTERVAL EVENTS/MICU COURSE: CVVHD was started per nephrology. Patient required Levophed and albumin due to hypotension with CVVHD. NGT was placed to LIS for patient's ileus. Patient with a large BM. Patient's ASA and DVT ppx restarted given no acute source of bleeding. Pending abdominal X-ray given potential improvement in ileus given BM and hypoactive bowel sounds. Fluids stopped.    FOR FOLLOW UP:  [ ] Monitor mental status, if not improving by tomorrow 2/25 with abx and CVVHD --> consider LP per ID  [ ] F/u pending ID labs in specimen received  [ ] Abdominal X-ray - to evaluate ileus, patient with a large BM 2/23 and with hypoactive bowel sounds on exam  [ ] Consider removing NGT based on above  [ ] F/u w/ transplant ID whether patient needs atovaquone, patient on bactrim at home for transplant ppx but was stopped due to ARF  [ ] F/u nephro recs for CVVHD  [ ] Monitor for any GIB, questionable episode of coffee ground emesis earlier in hospitalization, aspirin and DVT ppx started 2/24  [ ] Wean off levo as tolerated  [ ] Holding eliquis, resume when able      MEDICATIONS:  STANDING MEDICATIONS  aspirin  chewable 81 milliGRAM(s) Oral daily  atorvastatin 10 milliGRAM(s) Oral at bedtime  chlorhexidine 2% Cloths 1 Application(s) Topical daily  cholecalciferol 1000 Unit(s) Oral daily  CRRT Treatment    <Continuous>  glucagon  Injectable 1 milliGRAM(s) IntraMuscular once  heparin   Injectable 5000 Unit(s) SubCutaneous every 8 hours  insulin lispro (ADMELOG) corrective regimen sliding scale   SubCutaneous every 6 hours  meropenem  IVPB 500 milliGRAM(s) IV Intermittent every 8 hours  norepinephrine Infusion 0.05 MICROgram(s)/kG/Min IV Continuous <Continuous>  nystatin    Suspension 158663 Unit(s) Oral four times a day  pantoprazole  Injectable 40 milliGRAM(s) IV Push two times a day  polyethylene glycol 3350 17 Gram(s) Oral daily  predniSONE   Tablet 15 milliGRAM(s) Oral daily  PrismaSATE Dialysate BGK 4 / 2.5 5000 milliLiter(s) CRRT <Continuous>  PrismaSOL Filtration BGK 4 / 2.5 5000 milliLiter(s) CRRT <Continuous>  PrismaSOL Filtration BGK 4 / 2.5 5000 milliLiter(s) CRRT <Continuous>  senna 2 Tablet(s) Oral daily  sevelamer carbonate 1600 milliGRAM(s) Oral every 8 hours  tacrolimus 1 milliGRAM(s) Oral two times a day  valGANciclovir 450 milliGRAM(s) Oral <User Schedule>    PRN MEDICATIONS      VITAL SIGNS: Last 24 Hours  T(C): 36.1 (24 Feb 2025 08:00), Max: 36.1 (24 Feb 2025 08:00)  T(F): 97 (24 Feb 2025 08:00), Max: 97 (24 Feb 2025 08:00)  HR: 97 (24 Feb 2025 11:00) (79 - 97)  BP: 123/58 (24 Feb 2025 11:00) (86/40 - 156/69)  BP(mean): 83 (24 Feb 2025 11:00) (58 - 99)  RR: 19 (24 Feb 2025 11:00) (13 - 26)  SpO2: 100% (24 Feb 2025 11:00) (91% - 100%)    LABS:                        9.9    10.98 )-----------( 193      ( 23 Feb 2025 22:40 )             31.8     02-24    136  |  100  |  96[H]  ----------------------------<  118[H]  4.3   |  20[L]  |  5.32[H]    Ca    7.8[L]      24 Feb 2025 06:39  Phos  6.2     02-24  Mg     2.5     02-24    TPro  5.3[L]  /  Alb  2.5[L]  /  TBili  0.4  /  DBili  x   /  AST  36  /  ALT  17  /  AlkPhos  54  02-24    PT/INR - ( 23 Feb 2025 22:40 )   PT: 12.9 sec;   INR: 1.12 ratio         PTT - ( 23 Feb 2025 22:40 )  PTT:32.4 sec  Urinalysis Basic - ( 24 Feb 2025 06:39 )    Color: x / Appearance: x / SG: x / pH: x  Gluc: 118 mg/dL / Ketone: x  / Bili: x / Urobili: x   Blood: x / Protein: x / Nitrite: x   Leuk Esterase: x / RBC: x / WBC x   Sq Epi: x / Non Sq Epi: x / Bacteria: x      ABG - ( 23 Feb 2025 15:22 )  pH, Arterial: 7.24  pH, Blood: x     /  pCO2: 44    /  pO2: 117   / HCO3: 19    / Base Excess: -8.3  /  SaO2: 100.0               Culture - Blood (collected 22 Feb 2025 03:11)  Source: .Blood Blood-Peripheral  Preliminary Report (24 Feb 2025 09:01):    No growth at 48 Hours          RADIOLOGY:    ASSESSMENT & PLAN:    75yo M w/ pmhx HTN, HLD, nonischemic cardiomyopathy, chronic systolic heart failure s/p HM2 LVAD (6/2017), s/p heart transplant from Hep. 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 Found to have septic shock, (2/19 BCx pseudo, 2/22 BCx NGTD). Still episodes of hypotension. Worsening NORMAN. Accepted to MICU for CRRT.      ====================NEUROLOGY=======================  # Metabolic encephalopathy.  - Likely multi-factorial etiologies: septic vs uremic vs hypercapnic, less likely meningitis  - Patient baseline AOx3. AOx1 at ED arrival, iso of infection vs metabolic derangements (uremic encephelopathy). Currently AOx3 2/23 but lethargic.   - CTH and angio w/o hemorrhage or stenosis   - TSH wnl / B12, CK 2200  - Fall precautions  - spot EEG: negative for seizures     Plan  - Monitor MS w/ abx and CRRT  - Consider LP if MS not improving    ====================RESPIRATORY======================  #Metabolic acidosis  - respiratory and metabolic acidosis VpH 7.17, PCO2 57 on admission  - pH improving, likely as respiratory component has improved      ====================CARDIOVASCULAR==================  #Septic shock from pseudomonas bacteremia  - POCUS reveals no evidence of  fluid overload  - Pt is also likely intravascular depleted with ileus and poor po intake.   - S/p IVF  - Patient volume up hold off IVF  - Pressors: levo 0.01    Plan  - F/u transplant ID recs - CMV ordered    # H/O heart transplant.   - Nonischemic cardiomyopathy, chronic systolic heart failure s/p HM2 LVAD (6/2017), s/p heart transplant from Hep. C donor (treated) 2/23/18   - Home Tacrolimus dose 2mg BID, Pred 15mg qd  - Intolerant of Cellcept due to leukopenia.    Plan  - Continue with home Pred 15mg qd  - Hold home Bactrim given hyperkalemia - can consider Atovaquone  - Restart ASA  - c/w home Atorvastatin (therapeutic interchange)   - HOLD home Metoprolol from 200mg with hold parameters due to hypotension (02/23)  - Tacro level daily : home regimen 2mg BID, c/w 1mg BID    # Paroxysmal atrial fibrillation.   - Home regimen : Eliquis 5 mg PO BID at home for hx of of afib and IJ thrombi  - EKG on admission - nsr   - EVDC1ATIC  = 3  - Last Dose : 2/20 AM     Plan  - Hold eliquis    # Hypertension (chronic)    Plan  - Hold home Toprol  mg PO QD ISO hypotension  - Hold home Losartan 75 mg PO QD due to NORMAN & hypotension.    # Vascular disorder of lower extremity.   - Extremities appear cool and dry     Plan  - c/w Local wound care by podiatry   - Podiatry recommendations - pending SHANEKA studies, consider vascular evaluation if abnormal.    ====================/RENAL========================  NORMAN on CKD  ATN 2/2 septic shock  - Known hx of CKD, Cr now uptrending  - Scr ranged from 1.3-2.1 in 2023. Most recent Scr was 1.75 on 1/30/24. On admission, Scr was 1.9 and is worsening now. UA showed trace ketones.   - No improvement with IVF, likely ATN i/s/o contrast/medications.   - S/p double lumen L fem catheter  - CRRT started    Plan  - Hold home ARB for now   - Euvolemic - will hold home Bumex 0.5mg  - c/w renvela for hyperphosphatemia  - CRRT as per nephro    #Hyperkalemia (resolved)   - K 6.2 at ED arrival  - shifted in the ED, no EKG changes consistent with severe hyper K. Cr not significantly elevated form baseline, c/f RTA from bactrim and Tacro. Stable   - s/p Lokelma 10mg BID for 3 days    Plan  - Hold home ARB  - f/u Tacro level daily   - Hold home bactrim - ID consult regarding Atovaquone   - c/w monitoring patient on Tele.  - CRRT as above      ====================GI/NUTRITION=====================  #Ileus  #+/- coffee ground emesis  - Ileus could potentially be source of infx  - 1 episode of coffee ground emesis, unclear if true GIB  - Hb stable   - CTAP (2/22) ileus vs partial SBO - cannot r/o GI bleed  - NGT placed set to LIS    Plan  - monitor for further episodes  - c/w PPI IV 40 BID   - c/w trending CBC Q12H  - Restart DVT Ppx/ASA for now; if no more GIB and HgB stable  - KUB today to evaluate ileus  - if repeat episode, GI consult   - If repeat episode or worsening lactate, CT angiogram with venous phase.    ====================SKIN=============================  #Swollen arm   - RUE swollen with hx of IJ thrombus, possible infiltrated IV. LUE now swollen. + Pulses, and no motor deficits   - lower concern for compartment syndrome  - RUE Duplex without thrombus     Plan  - c/w derm recs regarding bilateral bullae on upper extremities (Likely edema bullae; please see Derm note  - Elevate R arm, warm compresses and arm precautions.    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 No manifest signs of bowel ischemia.      Interval worsening of right lower lobe and right middle lobe atelectasis secondary to elevated right hemidiaphragm when compared to prior CT dated 7/11/2023. Superimposed infection in the collapsed lungs is not excluded    Plan  - c/w Meropenem (2/21- ), Hold off additional doses of Vancomycin (MRSA swab negative)  - c/w home Valganciclovir for ppx renally dosed   - f/u infectious labs - Toxoplasma, EBV, Fungitell, Galactomannan, CMV, Cryptococcus, MRSA, ASCENCION virus, CMV, Crypto, ASCENCION virus  - f/u GI PCR, consider C. Diff testing   - Transplant ID recommendations   - Consider LP if MS not improving  - Warm blanket    ====================ENDOCRINE=======================  # Diabetes type 2.   - A1c 5.8    Plan  - ISS.    ====================HEMATOLOGIC/DVT PPx=============  # Hx of hemolytic anemia  - hx of hemolytic anemia, follows with Dr. Rosario as outpatient    Plan  - c/w Prednisone 15 mg PO QD   - f/u hematology/oncology recs  - Monitor H&H daily.    # DVT ppx  - restart heparin sq  - SCD    ====================ETHICS===========================  full code    Marquise Bhardwaj MD  Internal Medicine, PGY-1  Teams Preferred

## 2025-02-24 NOTE — PROGRESS NOTE ADULT - SUBJECTIVE AND OBJECTIVE BOX
Follow Up:      Interval History:    REVIEW OF SYSTEMS  [  ] ROS unobtainable because:    [  ] All other systems negative except as noted below    Constitutional:  [ ] fever [ ] chills  [ ] weight loss  [ ] weakness  Skin:  [ ] rash [ ] phlebitis	  Eyes: [ ] icterus [ ] pain  [ ] discharge	  ENMT: [ ] sore throat  [ ] thrush [ ] ulcers [ ] exudates  Respiratory: [ ] dyspnea [ ] hemoptysis [ ] cough [ ] sputum	  Cardiovascular:  [ ] chest pain [ ] palpitations [ ] edema	  Gastrointestinal:  [ ] nausea [ ] vomiting [ ] diarrhea [ ] constipation [ ] pain	  Genitourinary:  [ ] dysuria [ ] frequency [ ] hematuria [ ] discharge [ ] flank pain  [ ] incontinence  Musculoskeletal:  [ ] myalgias [ ] arthralgias [ ] arthritis  [ ] back pain  Neurological:  [ ] headache [ ] seizures  [ ] confusion/altered mental status    Allergies  No Known Allergies        ANTIMICROBIALS:  atovaquone  Suspension 1500 daily  nystatin    Suspension 049163 four times a day  piperacillin/tazobactam IVPB.. 3.375 every 8 hours  valGANciclovir 450 <User Schedule>      OTHER MEDS:  MEDICATIONS  (STANDING):  aspirin  chewable 81 daily  atorvastatin 10 at bedtime  glucagon  Injectable 1 once  heparin   Injectable 5000 every 8 hours  insulin lispro (ADMELOG) corrective regimen sliding scale  every 6 hours  norepinephrine Infusion 0.05 <Continuous>  pantoprazole  Injectable 40 two times a day  polyethylene glycol 3350 17 daily  predniSONE   Tablet 15 daily  senna 2 daily  tacrolimus 1 two times a day      Vital Signs Last 24 Hrs  T(C): 36.6 (24 Feb 2025 16:00), Max: 36.6 (24 Feb 2025 16:00)  T(F): 97.9 (24 Feb 2025 16:00), Max: 97.9 (24 Feb 2025 16:00)  HR: 101 (24 Feb 2025 16:00) (84 - 101)  BP: 114/55 (24 Feb 2025 16:00) (86/40 - 156/69)  BP(mean): 79 (24 Feb 2025 16:00) (58 - 99)  RR: 18 (24 Feb 2025 16:00) (13 - 21)  SpO2: 100% (24 Feb 2025 16:00) (91% - 100%)    Parameters below as of 24 Feb 2025 08:00  Patient On (Oxygen Delivery Method): room air        PHYSICAL EXAMINATION:  General: Alert and Awake, NAD  HEENT: PERRL, EOMI  Neck: Supple  Cardiac: RRR, No M/R/G  Resp: CTAB, No Wh/Rh/Ra  Abdomen: NBS, NT/ND, No HSM, No rigidity or guarding  MSK: No LE edema. No Calf tenderness  : No diaz  Skin: No rashes or lesions. Skin is warm and dry to the touch.   Neuro: Alert and Awake. CN 2-12 Grossly intact. Moves all four extremities spontaneously.  Psych: Calm, Pleasant, Cooperative                          9.9    10.98 )-----------( 193      ( 23 Feb 2025 22:40 )             31.8       02-24    136  |  101  |  68[H]  ----------------------------<  106[H]  4.4   |  21[L]  |  3.86[H]    Ca    7.7[L]      24 Feb 2025 11:49  Phos  4.2     02-24  Mg     2.5     02-24    TPro  5.3[L]  /  Alb  2.6[L]  /  TBili  0.5  /  DBili  x   /  AST  38  /  ALT  17  /  AlkPhos  49  02-24      Urinalysis Basic - ( 24 Feb 2025 11:49 )    Color: x / Appearance: x / SG: x / pH: x  Gluc: 106 mg/dL / Ketone: x  / Bili: x / Urobili: x   Blood: x / Protein: x / Nitrite: x   Leuk Esterase: x / RBC: x / WBC x   Sq Epi: x / Non Sq Epi: x / Bacteria: x        MICROBIOLOGY:  v  .Blood Blood-Peripheral  02-22-25   No growth at 48 Hours  --  --      Clean Catch Clean Catch (Midstream)  02-19-25   50,000 - 99,000 CFU/mL Alpha hemolytic strep "Susceptibilities not  performed"  --  --      .Blood Blood-Peripheral  02-19-25   Growth in aerobic bottle: Pseudomonas koreensis  Direct identification is available within approximately 3-5  hours either by Blood Panel Multiplexed PCR or Direct  MALDI-TOF. Details: [email or web address]  --  Blood Culture PCR  Pseudomonas species      .Blood Blood-Peripheral  02-19-25   No growth at 4 days  --  --        Toxoplasma IgG Screen: 10.70 IU/mL (02-21-25 @ 09:38)    CMVPCR Log: NotDetec Edo42CP/mL (02-22 @ 07:33)  Rapid RVP Result: NotDetec (02-19 @ 17:28)        RADIOLOGY:    <The imaging below has been reviewed and visualized by me independently. Findings as detailed in report below> Follow Up:  bacteremia    Interval History: afebrile. on CRRT.     REVIEW OF SYSTEMS  [  ] ROS unobtainable because:    [ x ] All other systems negative except as noted below    Constitutional:  [ ] fever [ ] chills  [ ] weight loss  [ ] weakness  Skin:  [ ] rash [ ] phlebitis	  Eyes: [ ] icterus [ ] pain  [ ] discharge	  ENMT: [ ] sore throat  [ ] thrush [ ] ulcers [ ] exudates  Respiratory: [ ] dyspnea [ ] hemoptysis [ ] cough [ ] sputum	  Cardiovascular:  [ ] chest pain [ ] palpitations [ ] edema	  Gastrointestinal:  [ ] nausea [ ] vomiting [ ] diarrhea [ ] constipation [ ] pain	  Genitourinary:  [ ] dysuria [ ] frequency [ ] hematuria [ ] discharge [ ] flank pain  [ ] incontinence  Musculoskeletal:  [ ] myalgias [ ] arthralgias [ ] arthritis  [ ] back pain  Neurological:  [ ] headache [ ] seizures  [ ] confusion/altered mental status    Allergies  No Known Allergies        ANTIMICROBIALS:  atovaquone  Suspension 1500 daily  nystatin    Suspension 883920 four times a day  piperacillin/tazobactam IVPB.. 3.375 every 8 hours  valGANciclovir 450 <User Schedule>      OTHER MEDS:  MEDICATIONS  (STANDING):  aspirin  chewable 81 daily  atorvastatin 10 at bedtime  glucagon  Injectable 1 once  heparin   Injectable 5000 every 8 hours  insulin lispro (ADMELOG) corrective regimen sliding scale  every 6 hours  norepinephrine Infusion 0.05 <Continuous>  pantoprazole  Injectable 40 two times a day  polyethylene glycol 3350 17 daily  predniSONE   Tablet 15 daily  senna 2 daily  tacrolimus 1 two times a day      Vital Signs Last 24 Hrs  T(C): 36.6 (24 Feb 2025 16:00), Max: 36.6 (24 Feb 2025 16:00)  T(F): 97.9 (24 Feb 2025 16:00), Max: 97.9 (24 Feb 2025 16:00)  HR: 101 (24 Feb 2025 16:00) (84 - 101)  BP: 114/55 (24 Feb 2025 16:00) (86/40 - 156/69)  BP(mean): 79 (24 Feb 2025 16:00) (58 - 99)  RR: 18 (24 Feb 2025 16:00) (13 - 21)  SpO2: 100% (24 Feb 2025 16:00) (91% - 100%)    Parameters below as of 24 Feb 2025 08:00  Patient On (Oxygen Delivery Method): room air        PHYSICAL EXAMINATION:  General: Alert and Awake, NAD  Cardiac: RRR, No M/R/G  Resp: CTAB, No Wh/Rh/Ra  Abdomen: NBS, NT/ND, No HSM, No rigidity or guarding  MSK: No LE edema. No Calf tenderness  : No diaz  Skin: No rashes or lesions. Skin is warm and dry to the touch.   Neuro: Alert and Awake. CN 2-12 Grossly intact. Moves all four extremities spontaneously.  Psych: Calm, Pleasant, Cooperative                          9.9    10.98 )-----------( 193      ( 23 Feb 2025 22:40 )             31.8       02-24    136  |  101  |  68[H]  ----------------------------<  106[H]  4.4   |  21[L]  |  3.86[H]    Ca    7.7[L]      24 Feb 2025 11:49  Phos  4.2     02-24  Mg     2.5     02-24    TPro  5.3[L]  /  Alb  2.6[L]  /  TBili  0.5  /  DBili  x   /  AST  38  /  ALT  17  /  AlkPhos  49  02-24      Urinalysis Basic - ( 24 Feb 2025 11:49 )    Color: x / Appearance: x / SG: x / pH: x  Gluc: 106 mg/dL / Ketone: x  / Bili: x / Urobili: x   Blood: x / Protein: x / Nitrite: x   Leuk Esterase: x / RBC: x / WBC x   Sq Epi: x / Non Sq Epi: x / Bacteria: x        MICROBIOLOGY:  v  .Blood Blood-Peripheral  02-22-25   No growth at 48 Hours  --  --      Clean Catch Clean Catch (Midstream)  02-19-25   50,000 - 99,000 CFU/mL Alpha hemolytic strep "Susceptibilities not  performed"  --  --      .Blood Blood-Peripheral  02-19-25   Growth in aerobic bottle: Pseudomonas koreensis  Direct identification is available within approximately 3-5  hours either by Blood Panel Multiplexed PCR or Direct  MALDI-TOF. Details: [email or web address]  --  Blood Culture PCR  Pseudomonas species      .Blood Blood-Peripheral  02-19-25   No growth at 4 days  --  --        Toxoplasma IgG Screen: 10.70 IU/mL (02-21-25 @ 09:38)    CMVPCR Log: NotDetec Hlf86IA/mL (02-22 @ 07:33)  Rapid RVP Result: NotDetec (02-19 @ 17:28)        RADIOLOGY:    <The imaging below has been reviewed and visualized by me independently. Findings as detailed in report below>    < from: CT Abdomen and Pelvis No Cont (02.22.25 @ 10:05) >  IMPRESSION:  Since prior study 2/22/2025 interval development of diffusely dilated   small bowel loops with air-fluid levels. Contrast from prior study has   passed into the colon. Findings are favored to represent ileus or less   likely partial small bowel obstruction rather than mechanical   obstruction. No manifest signs of bowel ischemia. However, correlation   with lactate levels and clinical evaluation as well as close observation   for bowel symptoms is recommended. Consider repeat CT if bowel symptoms   worsen. Hypoperfusion of the bowel is not assessed without contrast and   cannot be excluded.    Interval worsening of right lower lobe and right middle lobe atelectasis   secondary to elevated right hemidiaphragm when compared to prior CT dated   7/11/2023. Superimposed infection in the collapsed lungs is not excluded.    Signs of renal dysfunction with delayed renal nephrogram. No urinary   obstruction.    Otherwise no CT morphologic evidence of a focus of infection in the   abdomen or pelvis.    < end of copied text >

## 2025-02-24 NOTE — PROCEDURE NOTE - ADDITIONAL PROCEDURE DETAILS
Left IJ attempted first unable to advance wire   Right IJ with history of thrombus not accessed   Left femoral vein dialysis catheter placed via ultrasound VBG sent

## 2025-02-24 NOTE — ADVANCED PRACTICE NURSE CONSULT - RECOMMEDATIONS
Impression    LEFT Arm - Wound 2/2 Swelling      Recommendations    1. Left Arm - Swelling  Topical Therapy - Cleanse with normal saline 0.9%, pat dry, and apply Adaptic Touch semi-permeable dressing to wound bed. Cover with dry protective dressing. Frequency: QOD or PRN soiling  Elevation of the right arm is recommended to help reduce swelling

## 2025-02-24 NOTE — CHART NOTE - NSCHARTNOTEFT_GEN_A_CORE
CCU Accept Note    Transfer from: MICU   Accepting physican: Dr. Santiago    75yo M w/ pmhx HTN, HLD, nonischemic cardiomyopathy, chronic systolic heart failure s/p HM2 LVAD (6/2017), s/p heart transplant from Hep. 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 On the phone, the patient's godbrother mentioned that on 2/18, the patient was in the car with his godbrother and was disoriented asking to get off on the road 4 blocks before his house. In addition, a caretaker stated that when she spoke to Mr. Hameed on the phone at 11am on 2/18, he was doing well. However, when the caretaker visited 2 hours later at 1pm, the patient was disoriented and felt his legs were heavy. This prompted the patient to come to the hospital.     In the ED: Hypothermic 91.4, HR 80, /60. Pt noted to be Hyperkalemic (6.2) with Mixed respiratory and metabolic acidosis pH 7.2. Pt was given Calcium Gluconate 2g, D50/Insulin 5 unit, 40 mg IV lasix, Lokelma 10mg. Vancomycin and Zosyn.     On the floor, pt was receiving midodrine vanc, zosyn for septic shock. BCx 2/19 positive for pseudo, BCx 2/22 NGTD. NORMAN worsened. Patient persistently altered and meropenem started. Accepted to MICU for CRRT. L fem vein double-tunneled catheter placed.      INTERVAL EVENTS/HOSPITAL COURSE: CVVHD was started per nephrology. Patient required Levophed and albumin due to hypotension with CVVHD. NGT was placed to LIS for patient's ileus. Patient with a large BM. Patient's ASA and DVT ppx restarted given no acute source of bleeding. Pending abdominal X-ray given potential improvement in ileus given BM and hypoactive bowel sounds. Fluids stopped.    OBJECTIVE DATA:    Vital Signs Last 24 Hrs  T(C): 36.9 (24 Feb 2025 20:00), Max: 36.9 (24 Feb 2025 20:00)  T(F): 98.4 (24 Feb 2025 20:00), Max: 98.4 (24 Feb 2025 20:00)  HR: 110 (24 Feb 2025 21:50) (84 - 110)  BP: 138/86 (24 Feb 2025 21:50) (86/40 - 156/69)  BP(mean): 103 (24 Feb 2025 21:50) (58 - 103)  RR: 22 (24 Feb 2025 21:50) (13 - 22)  SpO2: 99% (24 Feb 2025 21:50) (91% - 100%)    Parameters below as of 24 Feb 2025 21:50  Patient On (Oxygen Delivery Method): room air      I&O's Summary    23 Feb 2025 07:01  -  24 Feb 2025 07:00  --------------------------------------------------------  IN: 2675.5 mL / OUT: 403 mL / NET: 2272.5 mL    24 Feb 2025 07:01  -  24 Feb 2025 22:53  --------------------------------------------------------  IN: 811.7 mL / OUT: 726 mL / NET: 85.7 mL        Allergies:      MEDICATIONS  (STANDING):  aspirin  chewable 81 milliGRAM(s) Oral daily  atorvastatin 10 milliGRAM(s) Oral at bedtime  atovaquone  Suspension 1500 milliGRAM(s) Oral daily  chlorhexidine 2% Cloths 1 Application(s) Topical daily  cholecalciferol 1000 Unit(s) Oral daily  CRRT Treatment    <Continuous>  heparin   Injectable 5000 Unit(s) SubCutaneous every 8 hours  insulin lispro (ADMELOG) corrective regimen sliding scale   SubCutaneous every 6 hours  nystatin    Suspension [id number] Unit(s) Oral four times a day  pantoprazole  Injectable 40 milliGRAM(s) IV Push two times a day  piperacillin/tazobactam IVPB.. 3.375 Gram(s) IV Intermittent every 8 hours  polyethylene glycol 3350 17 Gram(s) Oral daily  predniSONE   Tablet 15 milliGRAM(s) Oral daily  PrismaSATE Dialysate BGK 4 / 2.5 5000 milliLiter(s) (1200 mL/Hr) CRRT <Continuous>  PrismaSOL Filtration BGK 4 / 2.5 5000 milliLiter(s) (1000 mL/Hr) CRRT <Continuous>  PrismaSOL Filtration BGK 4 / 2.5 5000 milliLiter(s) (200 mL/Hr) CRRT <Continuous>  senna 2 Tablet(s) Oral daily  sevelamer carbonate 1600 milliGRAM(s) Oral every 8 hours  tacrolimus 1 milliGRAM(s) Oral two times a day  valGANciclovir 450 milliGRAM(s) Oral <User Schedule>    MEDICATIONS  (PRN):      LABS                              135    |  101    |  57                  Calcium: 8.0   / iCa: x      (02-24 @ 17:31)    ----------------------------<  109       Magnesium: 2.5                              4.5     |  21     |  3.19             Phosphorous: 3.7      TPro  5.5    /  Alb  2.7    /  TBili  0.5    /  DBili  x      /  AST  36     /  ALT  16     /  AlkPhos  53     24 Feb 2025 17:31        EKG:  Telemetry:  Echo:  Cardiac Cath:  Imaging  ASSESSMENT & PLAN:    75yo M w/ pmhx HTN, HLD, nonischemic cardiomyopathy, chronic systolic heart failure s/p HM2 LVAD (6/2017), s/p heart transplant from Hep. 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 Found to have septic shock, (2/19 BCx pseudo, 2/22 BCx NGTD). Still episodes of hypotension. Worsening NORMAN. Accepted to MICU for CRRT.      ====================NEUROLOGY=======================  # Metabolic encephalopathy.  - Likely multi-factorial etiologies: septic vs uremic vs hypercapnic, less likely meningitis  - Patient baseline AOx3. AOx1 at ED arrival, iso of infection vs metabolic derangements (uremic encephelopathy). Currently AOx3 2/23 but lethargic.   - CTH and angio w/o hemorrhage or stenosis   - TSH wnl / B12, CK 2200  - Fall precautions  - spot EEG: negative for seizures     Plan  - Monitor MS w/ abx and CRRT  - Consider LP if MS not improving    ====================RESPIRATORY======================  #Metabolic acidosis  - respiratory and metabolic acidosis VpH 7.17, PCO2 57 on admission  - pH improving, likely as respiratory component has improved      ====================CARDIOVASCULAR==================  #Septic shock from pseudomonas bacteremia  - POCUS reveals no evidence of  fluid overload  - Pt is also likely intravascular depleted with ileus and poor po intake.   - S/p IVF  - Patient volume up hold off IVF  - Pressors: levo 0.01    Plan  - F/u transplant ID recs - CMV ordered    # H/O heart transplant.   - Nonischemic cardiomyopathy, chronic systolic heart failure s/p HM2 LVAD (6/2017), s/p heart transplant from Hep. C donor (treated) 2/23/18   - Home Tacrolimus dose 2mg BID, Pred 15mg qd  - Intolerant of Cellcept due to leukopenia.    Plan  - Continue with home Pred 15mg qd  - Hold home Bactrim given hyperkalemia - can consider Atovaquone  - Restart ASA  - c/w home Atorvastatin (therapeutic interchange)   - HOLD home Metoprolol from 200mg with hold parameters due to hypotension (02/23)  - Tacro level daily : home regimen 2mg BID, c/w 1mg BID    # Paroxysmal atrial fibrillation.   - Home regimen : Eliquis 5 mg PO BID at home for hx of of afib and IJ thrombi  - EKG on admission - nsr   - [id number]  = 3  - Last Dose : 2/20 AM     Plan  - Hold eliquis    # Hypertension (chronic)    Plan  - Hold home Toprol  mg PO QD ISO hypotension  - Hold home Losartan 75 mg PO QD due to NORMAN & hypotension.    # Vascular disorder of lower extremity.   - Extremities appear cool and dry     Plan  - c/w Local wound care by podiatry   - Podiatry recommendations - pending SHANEKA studies, consider vascular evaluation if abnormal.    ====================/RENAL========================  NORMAN on CKD  ATN 2/2 septic shock  - Known hx of CKD, Cr now uptrending  - Scr ranged from 1.3-2.1 in 2023. Most recent Scr was 1.75 on 1/30/24. On admission, Scr was 1.9 and is worsening now. UA showed trace ketones.   - No improvement with IVF, likely ATN i/s/o contrast/medications.   - S/p double lumen L fem catheter  - CRRT started    Plan  - Hold home ARB for now   - Euvolemic - will hold home Bumex 0.5mg  - c/w renvela for hyperphosphatemia  - CRRT as per nephro    #Hyperkalemia (resolved)   - K 6.2 at ED arrival  - shifted in the ED, no EKG changes consistent with severe hyper K. Cr not significantly elevated form baseline, c/f RTA from bactrim and Tacro. Stable   - s/p Lokelma 10mg BID for 3 days    Plan  - Hold home ARB  - f/u Tacro level daily   - Hold home bactrim - ID consult regarding Atovaquone   - c/w monitoring patient on Tele.  - CRRT as above      ====================GI/NUTRITION=====================  #Ileus  #+/- coffee ground emesis  - Ileus could potentially be source of infx  - 1 episode of coffee ground emesis, unclear if true GIB  - Hb stable   - CTAP (2/22) ileus vs partial SBO - cannot r/o GI bleed  - NGT placed set to LIS    Plan  - monitor for further episodes  - c/w PPI IV 40 BID   - c/w trending CBC Q12H  - Restart DVT Ppx/ASA for now; if no more GIB and HgB stable  - KUB today to evaluate ileus  - if repeat episode, GI consult   - If repeat episode or worsening lactate, CT angiogram with venous phase.    ====================SKIN=============================  #Swollen arm   - RUE swollen with hx of IJ thrombus, possible infiltrated IV. LUE now swollen. + Pulses, and no motor deficits   - lower concern for compartment syndrome  - RUE Duplex without thrombus     Plan  - c/w derm recs regarding bilateral bullae on upper extremities (Likely edema bullae; please see Derm note  - Elevate R arm, warm compresses and arm precautions.    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 No manifest signs of bowel ischemia.      Interval worsening of right lower lobe and right middle lobe atelectasis secondary to elevated right hemidiaphragm when compared to prior CT dated 7/11/2023. Superimposed infection in the collapsed lungs is not excluded    Plan  - c/w Meropenem (2/21- ), Hold off additional doses of Vancomycin (MRSA swab negative)  - c/w home Valganciclovir for ppx renally dosed   - f/u infectious labs - Toxoplasma, EBV, Fungitell, Galactomannan, CMV, Cryptococcus, MRSA, ASCENCION virus, CMV, Crypto, ASCENCION virus  - f/u GI PCR, consider C. Diff testing   - Transplant ID recommendations   - Consider LP if MS not improving  - Warm blanket    ====================ENDOCRINE=======================  # Diabetes type 2.   - A1c 5.8    Plan  - ISS.    ====================HEMATOLOGIC/DVT PPx=============  # Hx of hemolytic anemia  - hx of hemolytic anemia, follows with Dr. Rosario as outpatient    Plan  - c/w Prednisone 15 mg PO QD   - f/u hematology/oncology recs  - Monitor H&H daily.    # DVT ppx  - restart heparin sq  - SCD    ====================ETHICS===========================  full code

## 2025-02-24 NOTE — PROGRESS NOTE ADULT - ASSESSMENT
73yo M w/ pmhx HTN, HLD, nonischemic cardiomyopathy, chronic systolic heart failure s/p HM2 LVAD (6/2017), s/p heart transplant from Hep. 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 Found to have septic shock, (2/19 BCx pseudo, 2/22 BCx NGTD). Still episodes of hypotension. Worsening NORMAN. Accepted to MICU for CRRT.      ====================NEUROLOGY=======================  # Metabolic encephalopathy.  - Likely multi-factorial etiologies: uremic vs septic vs hypercapnic, less likely meningitis  - Patient baseline AOx3. AOx1 at ED arrival, iso of infection vs metabolic derangements (uremic encephelopathy). Currently AOx3 2/23 but lethargic.   - CTH and angio w/o hemorrhage or stenosis   - TSH wnl / B12, CK 2200  - Fall precautions and dysphagia screen  - spot EEG: negative for seizures     Plan  - Neurology recs appreciated   - Pending Infectious workup and possible LP 2/24.      ====================RESPIRATORY======================  # Respiratory acidosis  - VpH 7.17, PCO2 57  - Improving    Plan  - BIPAP      ====================CARDIOVASCULAR==================  #Severe sepsis with septic shock form pseudomonas bacteremia  - POCUS reveals no evidence of  fluid overload  - Pt is also likely intravascular depleted with ileus and poor po intake.   - S/p 1 liter LR ans start LR at 150cc/hr    - May need to start pressors if does not respond to fluids. No midodrine with ileus.    Plan  - F/u transplant ID recs    # H/O heart transplant.   - Nonischemic cardiomyopathy, chronic systolic heart failure s/p HM2 LVAD (6/2017), s/p heart transplant from Hep. C donor (treated) 2/23/18   - Home Tacrolimus dose 2mg BID, Pred 15mg qd  - Intolerant of Cellcept due to leukopenia.    Plan  - Continue with home Pred 15mg qd  - Hold home Bactrim given hyperkalemia - can consider Atovaquone  - Holding ASA for now; if no more GIB and HgB stable, can re-start  - c/w home Atorvastatin (therapeutic interchange)   - HOLD home Metoprolol from 200mg with hold parameters due to hypotension (02/23)  - Tacro level daily : home regimen 2mg BID, c/w 1mg BID    # Paroxysmal atrial fibrillation.   - Home regimen : Eliquis 5 mg PO BID at home for hx of of afib and IJ thrombi  - EKG on admission - nsr   - [id number]  = 3  - Last Dose : 2/20 AM     Plan  - currently Eliquis holding in anticipation for LP 2/24.    # Hypertension (chronic)    Plan  - Hold home Toprol  mg PO QD ISO hypotension  - Hold home Losartan 75 mg PO QD due to NORMAN & hypotension.    # Vascular disorder of lower extremity.   - Extremities appear cool and dry     Plan  - c/w Local wound care by podiatry   - Podiatry recommendations - pending SHANEKA studies, consider vascular evaluation if abnormal.      ====================/RENAL========================  NORMAN on CKD.   - Known hx of CKD 3, Cr 2.4 uptrending  - Scr ranged from 1.3-2.1 in 2023. Most recent Scr was 1.75 on 1/30/24. On admission, Scr was 1.9 and is worsening now. UA showed trace ketones.   - No improvement with IVF, likely ATN i/s/o contrast/medications.     Plan  - Cath placement in preparation for CRRT  - Hold home ARB for now   - Euvolemic - will hold home Bumex 0.5mg  - c/w renvela for hyperphosphatemia.    #Hyperkalemia (resolved)   - K 6.2 at ED arrival  - shifted in the ED, no EKG changes consistent with severe hyper K. Cr not significantly elevated form baseline, c/f RTA from bactrim and Tacro. Stable   - s/p Lokelma 10mg BID for 3 days    Plan  - Hold home ARB  - f/u Tacro level daily   - Hold home bactrim - ID consult regarding Atovaquone   - c/w monitoring patient on Tele.      ====================GI/NUTRITION=====================    # Coffee ground emesis  - 1 episode of coffee ground emesis overnight  - unclear if true GIB  - Hb stable   - CTAP (2/22) ileus vs partial SBO - cannot r/o GI bleed    Plan  - monitor for further episodes  - c/w PPI IV 40 BID   - c/w trending CBC Q12H  - Holding DVT Ppx/ASA for now; if no more GIB and HgB stable, can re-start  - if repeat episode, GI consult   - If repeat episode or worsening lactate, CT angiogram with venous phase.      ====================SKIN=============================  #Swollen arm   - RUE swollen with hx of IJ thrombus, possible infiltrated IV. LUE now swollen. + Pulses, and no motor deficits   - lower concern for compartment syndrome  - RUE Duplex without thrombus     Plan  - c/w derm recs regarding bilateral bullae on upper extremities (Likely edema bullae; please see Derm note  - Elevate R arm, warm compresses and arm precautions.      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 No manifest signs of bowel ischemia.      Interval worsening of right lower lobe and right middle lobe atelectasis secondary to elevated right hemidiaphragm when compared to prior CT dated 7/11/2023. Superimposed infection in the collapsed lungs is not excluded    Plan  - c/w Meropenem (2/21- ), Hold off additional doses of Vancomycin (MRSA swab negative)  - c/w home Valganciclovir for ppx renally dosed   - f/u infectious labs - Toxoplasma, EBV, Fungitell, Galactomannan, CMV, Cryptococcus, MRSA, ASCENCION virus, CMV, Crypto, ASCENCION virus  - f/u GI PCR, consider C. Diff testing   - Transplant ID recommendations   - LP pending Monday 2/24  - Warm blanket    ====================ENDOCRINE=======================  # Diabetes type 2.   - A1c 5.8    Plan  - ISS.    ====================HEMATOLOGIC/DVT PPx=============  # Hx of hemolytic anemia  - hx of hemolytic anemia, follows with Dr. Rosario as outpatient    Plan  - c/w Prednisone 15 mg PO QD   - f/u hematology/oncology recs  - Monitor H&H daily.    # DVT ppx  - Hold AC ISO hypotension  - SCD    ====================ETHICS===========================  full code   73yo M w/ pmhx HTN, HLD, nonischemic cardiomyopathy, chronic systolic heart failure s/p HM2 LVAD (6/2017), s/p heart transplant from Hep. 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 Found to have septic shock, (2/19 BCx pseudo, 2/22 BCx NGTD). Still episodes of hypotension. Worsening NORMAN. Accepted to MICU for CRRT.      ====================NEUROLOGY=======================  # Metabolic encephalopathy.  - Likely multi-factorial etiologies: septic vs uremic vs hypercapnic, less likely meningitis  - Patient baseline AOx3. AOx1 at ED arrival, iso of infection vs metabolic derangements (uremic encephelopathy). Currently AOx3 2/23 but lethargic.   - CTH and angio w/o hemorrhage or stenosis   - TSH wnl / B12, CK 2200  - Fall precautions  - spot EEG: negative for seizures     Plan  - Neurology recs appreciated   - Pending Infectious workup and possible LP 2/24.      ====================RESPIRATORY======================  #Metabolic acidosis  - respiratory and metabolic acidosis VpH 7.17, PCO2 57 on admission  - pH improving, likely as respiratory component has improved      ====================CARDIOVASCULAR==================  #Septic shock from pseudomonas bacteremia  - POCUS reveals no evidence of  fluid overload  - Pt is also likely intravascular depleted with ileus and poor po intake.   - S/p 1 L LR and C/w LR at 150cc/hr    - Pressors: levo 0.01    Plan  - F/u transplant ID recs - CMV ordered    # H/O heart transplant.   - Nonischemic cardiomyopathy, chronic systolic heart failure s/p HM2 LVAD (6/2017), s/p heart transplant from Hep. C donor (treated) 2/23/18   - Home Tacrolimus dose 2mg BID, Pred 15mg qd  - Intolerant of Cellcept due to leukopenia.    Plan  - Continue with home Pred 15mg qd  - Hold home Bactrim given hyperkalemia - can consider Atovaquone  - Holding ASA for now; if no more GIB and HgB stable, can re-start  - c/w home Atorvastatin (therapeutic interchange)   - HOLD home Metoprolol from 200mg with hold parameters due to hypotension (02/23)  - Tacro level daily : home regimen 2mg BID, c/w 1mg BID    # Paroxysmal atrial fibrillation.   - Home regimen : Eliquis 5 mg PO BID at home for hx of of afib and IJ thrombi  - EKG on admission - nsr   - [id number]  = 3  - Last Dose : 2/20 AM     Plan  - currently Eliquis holding in anticipation for LP 2/24.    # Hypertension (chronic)    Plan  - Hold home Toprol  mg PO QD ISO hypotension  - Hold home Losartan 75 mg PO QD due to NORMAN & hypotension.    # Vascular disorder of lower extremity.   - Extremities appear cool and dry     Plan  - c/w Local wound care by podiatry   - Podiatry recommendations - pending SHANEKA studies, consider vascular evaluation if abnormal.      ====================/RENAL========================  NORMAN on CKD.   - Known hx of CKD, Cr now uptrending  - Scr ranged from 1.3-2.1 in 2023. Most recent Scr was 1.75 on 1/30/24. On admission, Scr was 1.9 and is worsening now. UA showed trace ketones.   - No improvement with IVF, likely ATN i/s/o contrast/medications.   - S/p double lumen L fem catheter  - CRRT started    Plan  - Hold home ARB for now   - Euvolemic - will hold home Bumex 0.5mg  - c/w renvela for hyperphosphatemia  - CRRT as per nephro    #Hyperkalemia (resolved)   - K 6.2 at ED arrival  - shifted in the ED, no EKG changes consistent with severe hyper K. Cr not significantly elevated form baseline, c/f RTA from bactrim and Tacro. Stable   - s/p Lokelma 10mg BID for 3 days    Plan  - Hold home ARB  - f/u Tacro level daily   - Hold home bactrim - ID consult regarding Atovaquone   - c/w monitoring patient on Tele.  - CRRT as above      ====================GI/NUTRITION=====================  #Ileus  #+/- coffee ground emesis  - Ileus could potentially be source of infx  - 1 episode of coffee ground emesis, unclear if true GIB  - Hb stable   - CTAP (2/22) ileus vs partial SBO - cannot r/o GI bleed  - NGT placed set to LIS    Plan  - monitor for further episodes  - c/w PPI IV 40 BID   - c/w trending CBC Q12H  - Holding DVT Ppx/ASA for now; if no more GIB and HgB stable, can re-start  - if repeat episode, GI consult   - If repeat episode or worsening lactate, CT angiogram with venous phase.      ====================SKIN=============================  #Swollen arm   - RUE swollen with hx of IJ thrombus, possible infiltrated IV. LUE now swollen. + Pulses, and no motor deficits   - lower concern for compartment syndrome  - RUE Duplex without thrombus     Plan  - c/w derm recs regarding bilateral bullae on upper extremities (Likely edema bullae; please see Derm note  - Elevate R arm, warm compresses and arm precautions.      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 No manifest signs of bowel ischemia.      Interval worsening of right lower lobe and right middle lobe atelectasis secondary to elevated right hemidiaphragm when compared to prior CT dated 7/11/2023. Superimposed infection in the collapsed lungs is not excluded    Plan  - c/w Meropenem (2/21- ), Hold off additional doses of Vancomycin (MRSA swab negative)  - c/w home Valganciclovir for ppx renally dosed   - f/u infectious labs - Toxoplasma, EBV, Fungitell, Galactomannan, CMV, Cryptococcus, MRSA, ASCENCION virus, CMV, Crypto, ASCENCION virus  - f/u GI PCR, consider C. Diff testing   - Transplant ID recommendations   - LP pending Monday 2/24  - Warm blanket    ====================ENDOCRINE=======================  # Diabetes type 2.   - A1c 5.8    Plan  - ISS.    ====================HEMATOLOGIC/DVT PPx=============  # Hx of hemolytic anemia  - hx of hemolytic anemia, follows with Dr. Rosario as outpatient    Plan  - c/w Prednisone 15 mg PO QD   - f/u hematology/oncology recs  - Monitor H&H daily.    # DVT ppx  - Hold AC ISO hypotension  - SCD    ====================ETHICS===========================  full code   75yo M w/ pmhx HTN, HLD, nonischemic cardiomyopathy, chronic systolic heart failure s/p HM2 LVAD (6/2017), s/p heart transplant from Hep. 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 Found to have septic shock, (2/19 BCx pseudo, 2/22 BCx NGTD). Still episodes of hypotension. Worsening NORMAN. Accepted to MICU for CRRT.      ====================NEUROLOGY=======================  # Metabolic encephalopathy.  - Likely multi-factorial etiologies: septic vs uremic vs hypercapnic, less likely meningitis  - Patient baseline AOx3. AOx1 at ED arrival, iso of infection vs metabolic derangements (uremic encephelopathy). Currently AOx3 2/23 but lethargic.   - CTH and angio w/o hemorrhage or stenosis   - TSH wnl / B12, CK 2200  - Fall precautions  - spot EEG: negative for seizures     Plan  - Neurology recs appreciated   - Monitor MS w/ abx and CRRT  - Consider LP if MS not improving    ====================RESPIRATORY======================  #Metabolic acidosis  - respiratory and metabolic acidosis VpH 7.17, PCO2 57 on admission  - pH improving, likely as respiratory component has improved      ====================CARDIOVASCULAR==================  #Septic shock from pseudomonas bacteremia  - POCUS reveals no evidence of  fluid overload  - Pt is also likely intravascular depleted with ileus and poor po intake.   - S/p IVF  - Patient volume up hold off IVF  - Pressors: levo 0.01    Plan  - F/u transplant ID recs - CMV ordered    # H/O heart transplant.   - Nonischemic cardiomyopathy, chronic systolic heart failure s/p HM2 LVAD (6/2017), s/p heart transplant from Hep. C donor (treated) 2/23/18   - Home Tacrolimus dose 2mg BID, Pred 15mg qd  - Intolerant of Cellcept due to leukopenia.    Plan  - Continue with home Pred 15mg qd  - Hold home Bactrim given hyperkalemia - can consider Atovaquone  - Restart ASA  - c/w home Atorvastatin (therapeutic interchange)   - HOLD home Metoprolol from 200mg with hold parameters due to hypotension (02/23)  - Tacro level daily : home regimen 2mg BID, c/w 1mg BID    # Paroxysmal atrial fibrillation.   - Home regimen : Eliquis 5 mg PO BID at home for hx of of afib and IJ thrombi  - EKG on admission - nsr   - [id number]  = 3  - Last Dose : 2/20 AM     Plan  - Hold eliquis    # Hypertension (chronic)    Plan  - Hold home Toprol  mg PO QD ISO hypotension  - Hold home Losartan 75 mg PO QD due to NORMAN & hypotension.    # Vascular disorder of lower extremity.   - Extremities appear cool and dry     Plan  - c/w Local wound care by podiatry   - Podiatry recommendations - pending SHANEKA studies, consider vascular evaluation if abnormal.    ====================/RENAL========================  NORMAN on CKD  ATN 2/2 septic shock  - Known hx of CKD, Cr now uptrending  - Scr ranged from 1.3-2.1 in 2023. Most recent Scr was 1.75 on 1/30/24. On admission, Scr was 1.9 and is worsening now. UA showed trace ketones.   - No improvement with IVF, likely ATN i/s/o contrast/medications.   - S/p double lumen L fem catheter  - CRRT started    Plan  - Hold home ARB for now   - Euvolemic - will hold home Bumex 0.5mg  - c/w renvela for hyperphosphatemia  - CRRT as per nephro    #Hyperkalemia (resolved)   - K 6.2 at ED arrival  - shifted in the ED, no EKG changes consistent with severe hyper K. Cr not significantly elevated form baseline, c/f RTA from bactrim and Tacro. Stable   - s/p Lokelma 10mg BID for 3 days    Plan  - Hold home ARB  - f/u Tacro level daily   - Hold home bactrim - ID consult regarding Atovaquone   - c/w monitoring patient on Tele.  - CRRT as above      ====================GI/NUTRITION=====================  #Ileus  #+/- coffee ground emesis  - Ileus could potentially be source of infx  - 1 episode of coffee ground emesis, unclear if true GIB  - Hb stable   - CTAP (2/22) ileus vs partial SBO - cannot r/o GI bleed  - NGT placed set to LIS    Plan  - monitor for further episodes  - c/w PPI IV 40 BID   - c/w trending CBC Q12H  - Restart DVT Ppx/ASA for now; if no more GIB and HgB stable  - KUB today to evaluate ileus  - if repeat episode, GI consult   - If repeat episode or worsening lactate, CT angiogram with venous phase.    ====================SKIN=============================  #Swollen arm   - RUE swollen with hx of IJ thrombus, possible infiltrated IV. LUE now swollen. + Pulses, and no motor deficits   - lower concern for compartment syndrome  - RUE Duplex without thrombus     Plan  - c/w derm recs regarding bilateral bullae on upper extremities (Likely edema bullae; please see Derm note  - Elevate R arm, warm compresses and arm precautions.    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 No manifest signs of bowel ischemia.      Interval worsening of right lower lobe and right middle lobe atelectasis secondary to elevated right hemidiaphragm when compared to prior CT dated 7/11/2023. Superimposed infection in the collapsed lungs is not excluded    Plan  - c/w Meropenem (2/21- ), Hold off additional doses of Vancomycin (MRSA swab negative)  - c/w home Valganciclovir for ppx renally dosed   - f/u infectious labs - Toxoplasma, EBV, Fungitell, Galactomannan, CMV, Cryptococcus, MRSA, ASCENCION virus, CMV, Crypto, ASCENCION virus  - f/u GI PCR, consider C. Diff testing   - Transplant ID recommendations   - Consider LP if MS not improving  - Warm blanket    ====================ENDOCRINE=======================  # Diabetes type 2.   - A1c 5.8    Plan  - ISS.    ====================HEMATOLOGIC/DVT PPx=============  # Hx of hemolytic anemia  - hx of hemolytic anemia, follows with Dr. Rosario as outpatient    Plan  - c/w Prednisone 15 mg PO QD   - f/u hematology/oncology recs  - Monitor H&H daily.    # DVT ppx  - restart heparin sq  - SCD    ====================ETHICS===========================  full code

## 2025-02-25 ENCOUNTER — LABORATORY RESULT (OUTPATIENT)
Age: 75
End: 2025-02-25

## 2025-02-25 ENCOUNTER — RESULT REVIEW (OUTPATIENT)
Age: 75
End: 2025-02-25

## 2025-02-25 NOTE — PROGRESS NOTE ADULT - ASSESSMENT
ASSESSMENT & PLAN:    73yo M w/ pmhx HTN, HLD, nonischemic cardiomyopathy, chronic systolic heart failure s/p HM2 LVAD (6/2017), s/p heart transplant from Hep. 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 Found to have septic shock, (2/19 BCx pseudo, 2/22 BCx NGTD). Still episodes of hypotension. Worsening NORMAN. Accepted to MICU for CRRT.      ====================NEUROLOGY=======================  # Metabolic encephalopathy.  - Likely multi-factorial etiologies: septic vs uremic vs hypercapnic, less likely meningitis  - Patient baseline AOx3. AOx1 at ED arrival, iso of infection vs metabolic derangements (uremic encephelopathy). Currently AOx3 2/23 but lethargic.   - CTH and angio w/o hemorrhage or stenosis   - TSH wnl / B12, CK 2200  - Fall precautions  - spot EEG: negative for seizures     Plan  - Monitor MS w/ abx and CRRT  - Consider LP if MS not improving    ====================RESPIRATORY======================  #Metabolic acidosis  - respiratory and metabolic acidosis VpH 7.17, PCO2 57 on admission  - pH improving, likely as respiratory component has improved      ====================CARDIOVASCULAR==================  #Septic shock from pseudomonas bacteremia  - POCUS reveals no evidence of  fluid overload  - Pt is also likely intravascular depleted with ileus and poor po intake.   - S/p IVF  - Patient volume up hold off IVF  - Pressors: levo 0.01    Plan  - F/u transplant ID recs - CMV ordered    # H/O heart transplant.   - Nonischemic cardiomyopathy, chronic systolic heart failure s/p HM2 LVAD (6/2017), s/p heart transplant from Hep. C donor (treated) 2/23/18   - Home Tacrolimus dose 2mg BID, Pred 15mg qd  - Intolerant of Cellcept due to leukopenia.    Plan  - Continue with home Pred 15mg qd  - Hold home Bactrim given hyperkalemia - can consider Atovaquone  - Restart ASA  - c/w home Atorvastatin (therapeutic interchange)   - HOLD home Metoprolol from 200mg with hold parameters due to hypotension (02/23)  - Tacro level daily : home regimen 2mg BID, c/w 1mg BID    # Paroxysmal atrial fibrillation.   - Home regimen : Eliquis 5 mg PO BID at home for hx of of afib and IJ thrombi  - EKG on admission - nsr   - [id number]  = 3  - Last Dose : 2/20 AM     Plan  - Hold eliquis    # Hypertension (chronic)    Plan  - Hold home Toprol  mg PO QD ISO hypotension  - Hold home Losartan 75 mg PO QD due to NORMAN & hypotension.    # Vascular disorder of lower extremity.   - Extremities appear cool and dry     Plan  - c/w Local wound care by podiatry   - Podiatry recommendations - pending SHANEKA studies, consider vascular evaluation if abnormal.    ====================/RENAL========================  NORMAN on CKD  ATN 2/2 septic shock  - Known hx of CKD, Cr now uptrending  - Scr ranged from 1.3-2.1 in 2023. Most recent Scr was 1.75 on 1/30/24. On admission, Scr was 1.9 and is worsening now. UA showed trace ketones.   - No improvement with IVF, likely ATN i/s/o contrast/medications.   - S/p double lumen L fem catheter  - CRRT started    Plan  - Hold home ARB for now   - Euvolemic - will hold home Bumex 0.5mg  - c/w renvela for hyperphosphatemia  - CRRT as per nephro    #Hyperkalemia (resolved)   - K 6.2 at ED arrival  - shifted in the ED, no EKG changes consistent with severe hyper K. Cr not significantly elevated form baseline, c/f RTA from bactrim and Tacro. Stable   - s/p Lokelma 10mg BID for 3 days    Plan  - Hold home ARB  - f/u Tacro level daily   - Hold home bactrim - ID consult regarding Atovaquone   - c/w monitoring patient on Tele.  - CRRT as above      ====================GI/NUTRITION=====================  #Ileus  #+/- coffee ground emesis  - Ileus could potentially be source of infx  - 1 episode of coffee ground emesis, unclear if true GIB  - Hb stable   - CTAP (2/22) ileus vs partial SBO - cannot r/o GI bleed  - NGT placed set to LIS    Plan  - monitor for further episodes  - c/w PPI IV 40 BID   - c/w trending CBC Q12H  - Restart DVT Ppx/ASA for now; if no more GIB and HgB stable  - KUB today to evaluate ileus  - if repeat episode, GI consult   - If repeat episode or worsening lactate, CT angiogram with venous phase.    ====================SKIN=============================  #Swollen arm   - RUE swollen with hx of IJ thrombus, possible infiltrated IV. LUE now swollen. + Pulses, and no motor deficits   - lower concern for compartment syndrome  - RUE Duplex without thrombus     Plan  - c/w derm recs regarding bilateral bullae on upper extremities (Likely edema bullae; please see Derm note  - Elevate R arm, warm compresses and arm precautions.    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 No manifest signs of bowel ischemia.      Interval worsening of right lower lobe and right middle lobe atelectasis secondary to elevated right hemidiaphragm when compared to prior CT dated 7/11/2023. Superimposed infection in the collapsed lungs is not excluded    Plan  - c/w Meropenem (2/21- ), Hold off additional doses of Vancomycin (MRSA swab negative)  - c/w home Valganciclovir for ppx renally dosed   - f/u infectious labs - Toxoplasma, EBV, Fungitell, Galactomannan, CMV, Cryptococcus, MRSA, ASCENCION virus, CMV, Crypto, ASCENCION virus  - f/u GI PCR, consider C. Diff testing   - Transplant ID recommendations   - Consider LP if MS not improving  - Warm blanket    ====================ENDOCRINE=======================  # Diabetes type 2.   - A1c 5.8    Plan  - ISS.    ====================HEMATOLOGIC/DVT PPx=============  # Hx of hemolytic anemia  - hx of hemolytic anemia, follows with Dr. Rosario as outpatient    Plan  - c/w Prednisone 15 mg PO QD   - f/u hematology/oncology recs  - Monitor H&H daily.    # DVT ppx  - restart heparin sq  - SCD    ====================ETHICS===========================  full code.   ASSESSMENT & PLAN:    75yo M w/ pmhx HTN, HLD, nonischemic cardiomyopathy, chronic systolic heart failure s/p HM2 LVAD (6/2017), s/p heart transplant from Hep. 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 Found to have septic shock, (2/19 BCx pseudo, 2/22 BCx NGTD). Still episodes of hypotension. Worsening NORMAN. Accepted to MICU for CRRT.      ====================NEUROLOGY=======================  # Metabolic encephalopathy.  - Likely multi-factorial etiologies: septic vs uremic vs hypercapnic, less likely meningitis  - Patient baseline AOx3. AOx1 at ED arrival, iso of infection vs metabolic derangements (uremic encephelopathy). Currently AOx3 2/23 but lethargic.   - CTH and angio w/o hemorrhage or stenosis   - TSH wnl / B12, CK 2200  - Fall precautions  - spot EEG 2/21: negative for seizures     Plan  - Monitor MS w/ abx and CRRT  - Neuro consult for persistent AMS, consider LP    ====================RESPIRATORY======================  #Metabolic acidosis  - respiratory and metabolic acidosis VpH 7.17, PCO2 57 on admission  - pH improving, likely as respiratory component has improved      ====================CARDIOVASCULAR==================  #Septic shock from pseudomonas bacteremia  - POCUS reveals no evidence of  fluid overload  - Pt is also likely intravascular depleted with ileus and poor po intake.   - S/p IVF  - Patient volume up hold off IVF  - Pressors: s/p levo, off 2/24    Plan  - F/u transplant ID recs - CMV ordered    # H/O heart transplant.   - Nonischemic cardiomyopathy, chronic systolic heart failure s/p HM2 LVAD (6/2017), s/p heart transplant from Hep. C donor (treated) 2/23/18   - Home Tacrolimus dose 2mg BID, Pred 15mg qd  - Intolerant of Cellcept due to leukopenia.    Plan  - Continue with home Pred 15mg qd  - Hold home Bactrim given hyperkalemia - can consider Atovaquone  - Restart ASA  - c/w home Atorvastatin (therapeutic interchange)   - HOLD home Metoprolol from 200mg with hold parameters due to hypotension (02/23)  - Tacro level daily : home regimen 2mg BID, c/w 1mg BID    # Paroxysmal atrial fibrillation.   - Home regimen : Eliquis 5 mg PO BID at home for hx of of afib and IJ thrombi  - EKG on admission - nsr   - [id number]  = 3  - Last Dose : 2/20 AM     Plan  - Hold eliquis    # Hypertension (chronic)    Plan  - Hold home Toprol  mg PO QD ISO hypotension  - Hold home Losartan 75 mg PO QD due to NORMAN & hypotension.    # Vascular disorder of lower extremity.   - Extremities appear cool and dry     Plan  - c/w Local wound care by podiatry   - Podiatry recommendations - pending SHANEKA studies, consider vascular evaluation if abnormal.    ====================/RENAL========================  NORMAN on CKD  ATN 2/2 septic shock  - Known hx of CKD, Cr now uptrending  - Scr ranged from 1.3-2.1 in 2023. Most recent Scr was 1.75 on 1/30/24. On admission, Scr was 1.9 and is worsening now. UA showed trace ketones.   - No improvement with IVF, likely ATN i/s/o contrast/medications.   - S/p double lumen L fem catheter  - CRRT net even    Plan  - Hold home ARB for now   - Euvolemic - will hold home Bumex 0.5mg  - c/w renvela for hyperphosphatemia  - CRRT as per nephro    #Hyperkalemia (resolved)   - K 6.2 at ED arrival  - shifted in the ED, no EKG changes consistent with severe hyper K. Cr not significantly elevated form baseline, c/f RTA from bactrim and Tacro. Stable   - s/p Lokelma 10mg BID for 3 days    Plan  - Hold home ARB  - f/u Tacro level daily   - Hold home bactrim - ID consult regarding Atovaquone   - c/w monitoring patient on Tele.  - CRRT as above      ====================GI/NUTRITION=====================  #Ileus  #+/- coffee ground emesis  - Ileus could potentially be source of infx  - 1 episode of coffee ground emesis, unclear if true GIB  - Hb stable   - CTAP (2/22) ileus vs partial SBO - cannot r/o GI bleed  - NGT placed set to LIS    Plan  - monitor for further episodes  - c/w PPI IV 40 BID   - c/w trending CBC Q12H  - bowel regimen: senna, miralax  - Restart DVT Ppx/ASA for now; if no more GIB and HgB stable  - if repeat episode, GI consult   - If repeat episode or worsening lactate, CT angiogram with venous phase.    ====================SKIN=============================  #Swollen arm   - RUE swollen with hx of IJ thrombus, possible infiltrated IV. LUE now swollen. + Pulses, and no motor deficits   - lower concern for compartment syndrome  - RUE Duplex without thrombus     Plan  - c/w derm recs regarding bilateral bullae on upper extremities (Likely edema bullae; please see Derm note  - Elevate R arm, warm compresses and arm precautions.    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 No manifest signs of bowel ischemia.      Interval worsening of right lower lobe and right middle lobe atelectasis secondary to elevated right hemidiaphragm when compared to prior CT dated 7/11/2023. Superimposed infection in the collapsed lungs is not excluded  - s/p meropenem (2/21-2/24)    Plan  - Hold off additional doses of Vancomycin (MRSA swab negative)  - c/w home Valganciclovir for ppx renally dosed   - f/u infectious labs - Toxoplasma, EBV, Fungitell, Galactomannan, CMV, Cryptococcus, MRSA, ASCENCION virus, CMV, Crypto, ASCENCION virus  - f/u GI PCR, consider C. Diff testing   - Transplant ID recommendations   - Consider LP if MS not improving  - Warm blanket    ====================ENDOCRINE=======================  # Diabetes type 2.   - A1c 5.8    Plan  - ISS.    ====================HEMATOLOGIC/DVT PPx=============  # Hx of hemolytic anemia  - hx of hemolytic anemia, follows with Dr. Rosario as outpatient    Plan  - c/w Prednisone 15 mg PO QD   - f/u hematology/oncology recs  - Monitor H&H daily.  - f/u hemolysis labs and peripheral smear    # DVT ppx  - restart heparin sq  - SCD    ====================ETHICS===========================  full code.   ASSESSMENT & PLAN:    73yo M w/ pmhx HTN, HLD, nonischemic cardiomyopathy, chronic systolic heart failure s/p HM2 LVAD (6/2017), s/p heart transplant from Hep. 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 Found to have septic shock, (2/19 BCx pseudo, 2/22 BCx NGTD). Still episodes of hypotension. Worsening NORMAN. Accepted to MICU for CRRT.      ====================NEUROLOGY=======================  # Metabolic encephalopathy.  - Likely multi-factorial etiologies: septic vs uremic vs hypercapnic, less likely meningitis  - Patient baseline AOx3. AOx1 at ED arrival, iso of infection vs metabolic derangements (uremic encephelopathy). Currently AOx3 2/23 but lethargic.   - CTH and angio w/o hemorrhage or stenosis   - TSH wnl / B12, CK 2200  - Fall precautions  - spot EEG 2/21: negative for seizures     Plan  - Monitor MS w/ abx and CRRT    ====================RESPIRATORY======================  #Metabolic acidosis  - respiratory and metabolic acidosis VpH 7.17, PCO2 57 on admission  - pH improving, likely as respiratory component has improved      ====================CARDIOVASCULAR==================  #Septic shock from pseudomonas bacteremia  - POCUS reveals no evidence of  fluid overload  - Pt is also likely intravascular depleted with ileus and poor po intake.   - S/p IVF  - Patient volume up hold off IVF  - Pressors: s/p levo, off 2/24    Plan  - F/u transplant ID recs - CMV ordered    # H/O heart transplant.   - Nonischemic cardiomyopathy, chronic systolic heart failure s/p HM2 LVAD (6/2017), s/p heart transplant from Hep. C donor (treated) 2/23/18   - Home Tacrolimus dose 2mg BID, Pred 15mg qd  - Intolerant of Cellcept due to leukopenia.    Plan  - Continue with home Pred 15mg qd  - Hold home Bactrim given hyperkalemia - can consider Atovaquone  - Restart ASA  - c/w home Atorvastatin (therapeutic interchange)   - HOLD home Metoprolol from 200mg with hold parameters due to hypotension (02/23)  - Tacro level daily : home regimen 2mg BID, c/w 1mg BID    # Paroxysmal atrial fibrillation.   - Home regimen : Eliquis 5 mg PO BID at home for hx of of afib and IJ thrombi  - EKG on admission - nsr   - [id number]  = 3  - Last Dose : 2/20 AM     Plan  - Hold eliquis    # Hypertension (chronic)    Plan  - Hold home Toprol  mg PO QD ISO hypotension  - Hold home Losartan 75 mg PO QD due to NORMAN & hypotension.    # Vascular disorder of lower extremity.   - Extremities appear cool and dry     Plan  - c/w Local wound care by podiatry   - Podiatry recommendations - pending SHANEKA studies, consider vascular evaluation if abnormal.    ====================/RENAL========================  NORMAN on CKD  ATN 2/2 septic shock  - Known hx of CKD, Cr now uptrending  - Scr ranged from 1.3-2.1 in 2023. Most recent Scr was 1.75 on 1/30/24. On admission, Scr was 1.9 and is worsening now. UA showed trace ketones.   - No improvement with IVF, likely ATN i/s/o contrast/medications.   - S/p double lumen L fem catheter  - CRRT net even    Plan  - Hold home ARB for now   - Euvolemic - will hold home Bumex 0.5mg  - c/w renvela for hyperphosphatemia  - CRRT as per nephro    #Hyperkalemia (resolved)   - K 6.2 at ED arrival  - shifted in the ED, no EKG changes consistent with severe hyper K. Cr not significantly elevated form baseline, c/f RTA from bactrim and Tacro. Stable   - s/p Lokelma 10mg BID for 3 days    Plan  - Hold home ARB  - f/u Tacro level daily   - Hold home bactrim - ID consult regarding Atovaquone   - c/w monitoring patient on Tele.  - CRRT as above      ====================GI/NUTRITION=====================  #Ileus  #+/- coffee ground emesis  - Ileus could potentially be source of infx  - 1 episode of coffee ground emesis, unclear if true GIB  - Hb stable   - CTAP (2/22) ileus vs partial SBO - cannot r/o GI bleed  - NGT placed set to LIS    Plan  - monitor for further episodes  - c/w PPI IV 40 BID   - c/w trending CBC Q12H  - bowel regimen: senna, miralax  - Restart DVT Ppx/ASA for now; if no more GIB and HgB stable  - if repeat episode, GI consult   - If repeat episode or worsening lactate, CT angiogram with venous phase.    ====================SKIN=============================  #Swollen arm   - RUE swollen with hx of IJ thrombus, possible infiltrated IV. LUE now swollen. + Pulses, and no motor deficits   - lower concern for compartment syndrome  - RUE Duplex without thrombus     Plan  - c/w derm recs regarding bilateral bullae on upper extremities (Likely edema bullae; please see Derm note  - Elevate R arm, warm compresses and arm precautions.    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 No manifest signs of bowel ischemia.      Interval worsening of right lower lobe and right middle lobe atelectasis secondary to elevated right hemidiaphragm when compared to prior CT dated 7/11/2023. Superimposed infection in the collapsed lungs is not excluded  - s/p meropenem (2/21-2/24)    Plan  - Hold off additional doses of Vancomycin (MRSA swab negative)  - c/w home Valganciclovir for ppx renally dosed   - f/u infectious labs - Toxoplasma, EBV, Fungitell, Galactomannan, CMV, Cryptococcus, MRSA, ASCENCION virus, CMV, Crypto, ASCENCION virus  - f/u GI PCR, consider C. Diff testing   - Transplant ID recommendations   - Consider LP if MS not improving  - Warm blanket    ====================ENDOCRINE=======================  # Diabetes type 2.   - A1c 5.8    Plan  - ISS.    ====================HEMATOLOGIC/DVT PPx=============  # Hx of hemolytic anemia  - hx of hemolytic anemia, follows with Dr. Rosario as outpatient    Plan  - c/w Prednisone 15 mg PO QD   - f/u hematology/oncology recs  - Monitor H&H daily.  - f/u hemolysis labs and peripheral smear    # DVT ppx  - restart heparin sq  - SCD    ====================ETHICS===========================  full code.   ASSESSMENT & PLAN:    73yo M w/ pmhx HTN, HLD, nonischemic cardiomyopathy, chronic systolic heart failure s/p HM2 LVAD (6/2017), s/p heart transplant from Hep. 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 Found to have septic shock, (2/19 BCx pseudo, 2/22 BCx NGTD). Still episodes of hypotension. Worsening NORMAN. Accepted to MICU for CRRT.      ====================NEUROLOGY=======================  # Metabolic encephalopathy.  - Likely multi-factorial etiologies: septic vs uremic vs hypercapnic, less likely meningitis  - Patient baseline AOx3. AOx1 at ED arrival, iso of infection vs metabolic derangements (uremic encephelopathy).  - CTH and angio w/o hemorrhage or stenosis  - MR brain w/wo 2/26 with small subacute lacunar in R cerebellar region with surrounding cytotoxic edema  - TSH wnl / B12, CK 2200  - Fall precautions  - spot EEG 2/21: negative for seizures     Plan  - Monitor MS w/ abx and CRRT  - neurology consulted, appreciate recs  - consider LP for persistent unexplained AMS    ====================RESPIRATORY======================  #Metabolic acidosis  - respiratory and metabolic acidosis VpH 7.17, PCO2 57 on admission  - pH improving, likely as respiratory component has improved    - no active issues, on RA  - CT chest noncon 2/26 with R mainstem bronchus mucus plug and distal atelectasis      ====================CARDIOVASCULAR==================  #Septic shock from pseudomonas bacteremia  - POCUS reveals no evidence of  fluid overload  - Pt is also likely intravascular depleted with ileus and poor po intake.   - S/p IVF  - Patient volume up hold off IVF  - Pressors: s/p levo, off 2/24    Plan  - F/u transplant ID recs - CMV ordered    # H/O heart transplant.   - Nonischemic cardiomyopathy, chronic systolic heart failure s/p HM2 LVAD (6/2017), s/p heart transplant from Hep. C donor (treated) 2/23/18   - Home Tacrolimus dose 2mg BID, Pred 15mg qd  - Intolerant of Cellcept due to leukopenia.    Plan  - Hold home prednisone - switch to solucortef 75mg IV q8 (2/25 - )  - Hold home Bactrim given hyperkalemia - switch to Atovaquone  - Restart ASA  - c/w home Atorvastatin (therapeutic interchange)   - HOLD home Metoprolol from 200mg with hold parameters due to hypotension (02/23)  - Tacro level daily : home regimen 2mg BID, c/w 1mg BID    # Paroxysmal atrial fibrillation.   - Home regimen : Eliquis 5 mg PO BID at home for hx of of afib and IJ thrombi  - EKG on admission - nsr   - [id number]  = 3  - Last Dose : 2/20 AM     Plan  - Hold eliquis    # Hypertension (chronic)    Plan  - Hold home Toprol  mg PO QD ISO hypotension  - Hold home Losartan 75 mg PO QD due to NORMAN & hypotension.    # Vascular disorder of lower extremity.   - Extremities appear cool and dry     Plan  - c/w Local wound care by podiatry   - Podiatry recommendations - pending SHANEKA studies, consider vascular evaluation if abnormal.    ====================/RENAL========================  NORMAN on CKD  ATN 2/2 septic shock  - Known hx of CKD, Cr now uptrending  - Scr ranged from 1.3-2.1 in 2023. Most recent Scr was 1.75 on 1/30/24. On admission, Scr was 1.9 and is worsening now. UA showed trace ketones.   - No improvement with IVF, likely ATN i/s/o contrast/medications.   - S/p double lumen L fem catheter  - CRRT net even    Plan  - Hold home ARB for now   - Euvolemic - will hold home Bumex 0.5mg  - c/w renvela for hyperphosphatemia  - CRRT as per nephro    #Hyperkalemia (resolved)   - K 6.2 at ED arrival  - shifted in the ED, no EKG changes consistent with severe hyper K. Cr not significantly elevated form baseline, c/f RTA from bactrim and Tacro. Stable   - s/p Lokelma 10mg BID for 3 days    Plan  - Hold home ARB  - f/u Tacro level daily   - Hold home bactrim - ID consult regarding Atovaquone   - c/w monitoring patient on Tele.  - CRRT as above      ====================GI/NUTRITION=====================  #Ileus  #+/- coffee ground emesis  - Ileus could potentially be source of infx  - 1 episode of coffee ground emesis, unclear if true GIB  - Hb stable   - CTAP (2/22) ileus vs partial SBO - cannot r/o GI bleed  - NGT placed set to LIS    Plan  - monitor for further episodes  - c/w PPI IV 40 BID   - c/w trending CBC Q12H  - bowel regimen: senna, miralax  - Restart DVT Ppx/ASA for now; if no more GIB and HgB stable  - if repeat episode, GI consult   - If repeat episode or worsening lactate, CT angiogram with venous phase.    ====================SKIN=============================  #Swollen arm   - RUE swollen with hx of IJ thrombus, possible infiltrated IV. LUE now swollen. + Pulses, and no motor deficits   - lower concern for compartment syndrome  - RUE Duplex without thrombus    #chest wall swelling  - area swelling to R parasternal chest wall, hyperpigmented    Plan  - c/w derm recs regarding bilateral bullae on upper extremities (Likely edema bullae; please see Derm note  - Elevate R arm, warm compresses and arm precautions.  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 No manifest signs of bowel ischemia.      Interval worsening of right lower lobe and right middle lobe atelectasis secondary to elevated right hemidiaphragm when compared to prior CT dated 7/11/2023. Superimposed infection in the collapsed lungs is not excluded  - s/p meropenem (2/21-2/24)    Plan  - Hold off additional doses of Vancomycin (MRSA swab negative)  - c/w home Valganciclovir for ppx renally dosed   - f/u infectious labs - Toxoplasma, EBV, Fungitell, Galactomannan, CMV, Cryptococcus, MRSA, ASCENCION virus, CMV, Crypto, ASCENCION virus  - f/u GI PCR, consider C. Diff testing   - Transplant ID recommendations   - Consider LP if MS not improving  - Warm blanket    ====================ENDOCRINE=======================  # Diabetes type 2.   - A1c 5.8    Plan  - ISS.    ====================HEMATOLOGIC/DVT PPx=============  # Hx of hemolytic anemia  - hx of hemolytic anemia, follows with Dr. Rosario as outpatient    Plan  - f/u hematology/oncology recs  - Monitor H&H daily.  - f/u hemolysis labs and peripheral smear    # DVT ppx  - restart heparin sq  - SCD    ====================ETHICS===========================  full code.

## 2025-02-25 NOTE — PROGRESS NOTE ADULT - ASSESSMENT
74yo M pmhx HTN, HLD, CKD, nonischemic cardiomyopathy, chronic systolic heart failure s/p HM2 LVAD (6/2017), s/p heart transplant from Hep. 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 Superimposed infection in the collapsed lungs is not excluded.    CT abdomen pelvis (February 22) Since prior study 2/22/2025 interval development of diffusely dilated small bowel loops with air-fluid levels. Contrast from prior study has passed into the colon. Findings are favored to represent ileus or less likely partial small bowel obstruction rather than mechanical obstruction. No manifest signs of bowel ischemia.    At this point suspect that Pseudomonas bacteremia was secondary to either a respiratory source or gastrointestinal translocation.  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    Antimicrobials   Zosyn 2/19/ 2/20  Meropenem 2/20-2/24  Zosyn 2/25-    #Pseudomonas Bacteremia, Encephalopathy, Heart Transplant Recipient, Prophylactic Antibiotic  --Continue Zosyn 3.375g IV Q8H  --Continue Atovaquone 1,500 mg PO Q24H for PCP PPx  --If persisting encephalopathy will need to consider LP, at present favor That etiology of his encephalopathy is secondary to uremia  --Continue Valcyte 450 mg M/W/F for CMV PPx  --Continue to follow CBC with diff  --Continue to follow transaminases  --Continue to follow temperature curve  --Follow up on preliminary blood cultures            All recommendations are tentative pending Attending Attestation.    Ignacio Sepulveda MD, PGY-5  ID Fellow  [employer] Preferred  After 5pm/weekends call [phone]   72yo M pmhx HTN, HLD, CKD, nonischemic cardiomyopathy, chronic systolic heart failure s/p HM2 LVAD (6/2017), s/p heart transplant from Hep. 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 Superimposed infection in the collapsed lungs is not excluded.    CT abdomen pelvis (February 22) Since prior study 2/22/2025 interval development of diffusely dilated small bowel loops with air-fluid levels. Contrast from prior study has passed into the colon. Findings are favored to represent ileus or less likely partial small bowel obstruction rather than mechanical obstruction. No manifest signs of bowel ischemia.    At this point suspect that Pseudomonas bacteremia was secondary to either a respiratory source or gastrointestinal translocation.  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    Antimicrobials   Zosyn 2/19/ 2/20  Meropenem 2/20-2/24  Zosyn 2/25-    #Pseudomonas Bacteremia, Encephalopathy, Heart Transplant Recipient, Prophylactic Antibiotic  --Continue Zosyn 3.375g IV Q8H  --Continue Atovaquone 1,500 mg PO Q24H for PCP PPx  --If persisting encephalopathy will need to consider LP, at present favor That etiology of his encephalopathy is secondary to uremia  --Continue Valcyte 450 mg M/W/F for CMV PPx  --Continue to follow CBC with diff  --Continue to follow transaminases  --Continue to follow temperature curve  --Follow up on preliminary blood cultures      Discussed with attending    Ignacio Sepulveda MD, PGY-5  ID Fellow  [employer] Preferred  After 5pm/weekends call [phone]

## 2025-02-25 NOTE — PHYSICAL THERAPY INITIAL EVALUATION ADULT - NS ASR RISK AREAS PT EVAL
fall/impaired judgment/safety awareness/cognitive impairment
impaired judgment/safety awareness/cognitive impairment

## 2025-02-25 NOTE — PHYSICAL THERAPY INITIAL EVALUATION ADULT - ADDITIONAL COMMENTS
Patient noted to be a poor historian and unable to provide information regarding prior level of function and living situation. Per H&P, "Lives with godbrother. Ambulates with Cane."
Pt is AAOx1 to self only; unable to provide information regarding prior level of function and living situation. Per H&P, "Lives with godbrother. Ambulates with Cane."

## 2025-02-25 NOTE — PHYSICAL THERAPY INITIAL EVALUATION ADULT - IMPAIRMENTS FOUND, PT EVAL
aerobic capacity/endurance/cognitive impairment/decreased midline orientation/ergonomics and body mechanics/gait, locomotion, and balance/gross motor/poor safety awareness/posture
aerobic capacity/endurance/gait, locomotion, and balance

## 2025-02-25 NOTE — PHYSICAL THERAPY INITIAL EVALUATION ADULT - TRANSFER TRAINING, PT EVAL
GOAL: Patient will perform sit to stand transfers with modA and least restrictive assistive device in 2 weeks

## 2025-02-25 NOTE — PROGRESS NOTE ADULT - SUBJECTIVE AND OBJECTIVE BOX
PATIENT: BILLY RUSSELL, MRN: [id number]    CHIEF COMPLAINT: Patient is a 74y old  Male who presents with a chief complaint of Lethargy (25 Feb 2025 08:55)      INTERVAL HISTORY/OVERNIGHT EVENTS: No overnight events. Denies abdominal pain, chest pain or SOB, cough. Has been ambulating with assistance. Oriented to person, place, and time. Breathing comfortably on room air.    REVIEW OF SYSTEMS:    Constitutional:     [ ] negative [ ] fevers [ ] chills [ ] weight loss [ ] weight gain  HEENT:                  [ ] negative [ ] dry eyes [ ] eye irritation [ ] postnasal drip [ ] nasal congestion  CV:                         [ ] negative  [ ] chest pain [ ] orthopnea [ ] palpitations [ ] murmur  Resp:                     [ ] negative [ ] cough [ ] shortness of breath [ ] dyspnea [ ] wheezing [ ] sputum [ ] hemoptysis  GI:                          [ ] negative [ ] nausea [ ] vomiting [ ] diarrhea [ ] constipation [ ] abd pain [ ] dysphagia   :                        [ ] negative [ ] dysuria [ ] nocturia [ ] hematuria [ ] increased urinary frequency  Musculoskeletal: [ ] negative [ ] back pain [ ] myalgias [ ] arthralgias [ ] fracture  Skin:                       [ ] negative [ ] rash [ ] itch  Neurological:        [ ] negative [ ] headache [ ] dizziness [ ] syncope [ ] weakness [ ] numbness  Psychiatric:           [ ] negative [ ] anxiety [ ] depression  Endocrine:            [ ] negative [ ] diabetes [ ] thyroid problem  Heme/Lymph:      [ ] negative [ ] anemia [ ] bleeding problem  Allergic/Immune: [ ] negative [ ] itchy eyes [ ] nasal discharge [ ] hives [ ] angioedema    [ ] All other systems negative  [ ] Unable to assess ROS because ________.    MEDICATIONS:  MEDICATIONS  (STANDING):  aspirin  chewable 81 milliGRAM(s) Oral daily  atorvastatin 10 milliGRAM(s) Oral at bedtime  atovaquone  Suspension 1500 milliGRAM(s) Oral daily  chlorhexidine 2% Cloths 1 Application(s) Topical daily  cholecalciferol 1000 Unit(s) Oral daily  CRRT Treatment    <Continuous>  heparin   Injectable 5000 Unit(s) SubCutaneous every 8 hours  hydrocortisone sodium succinate Injectable 75 milliGRAM(s) IV Push every 8 hours  insulin lispro (ADMELOG) corrective regimen sliding scale   SubCutaneous every 6 hours  nystatin    Suspension 592753 Unit(s) Oral four times a day  pantoprazole  Injectable 40 milliGRAM(s) IV Push two times a day  piperacillin/tazobactam IVPB.. 4.5 Gram(s) IV Intermittent every 8 hours  polyethylene glycol 3350 17 Gram(s) Oral daily  PrismaSATE Dialysate BGK 4 / 2.5 5000 milliLiter(s) (1200 mL/Hr) CRRT <Continuous>  PrismaSOL Filtration BGK 4 / 2.5 5000 milliLiter(s) (1000 mL/Hr) CRRT <Continuous>  PrismaSOL Filtration BGK 4 / 2.5 5000 milliLiter(s) (200 mL/Hr) CRRT <Continuous>  senna 2 Tablet(s) Oral daily  sevelamer carbonate 1600 milliGRAM(s) Oral every 8 hours  tacrolimus    0.5 mG/mL Suspension 0.8 milliGRAM(s) Oral once  tacrolimus    0.5 mG/mL Suspension 0.8 milliGRAM(s) Oral <User Schedule>  valGANciclovir 50 mG/mL Oral Solution 450 milliGRAM(s) Oral <User Schedule>    MEDICATIONS  (PRN):      ALLERGIES: Allergies    No Known Allergies    Intolerances        OBJECTIVE:  ICU Vital Signs Last 24 Hrs  T(C): 36.8 (25 Feb 2025 07:00), Max: 37 (24 Feb 2025 22:00)  T(F): 98.2 (25 Feb 2025 07:00), Max: 98.6 (24 Feb 2025 22:00)  HR: 114 (25 Feb 2025 09:00) (94 - 119)  BP: 148/66 (25 Feb 2025 09:00) (95/44 - 168/67)  BP(mean): 95 (25 Feb 2025 09:00) (63 - 103)  ABP: --  ABP(mean): --  RR: 30 (25 Feb 2025 09:00) (15 - 31)  SpO2: 98% (25 Feb 2025 09:00) (97% - 100%)    O2 Parameters below as of 25 Feb 2025 09:00  Patient On (Oxygen Delivery Method): room air            Adult Advanced Hemodynamics Last 24 Hrs  CVP(mm Hg): --  CVP(cm H2O): --  CO: --  CI: --  PA: --  PA(mean): --  PCWP: --  SVR: --  SVRI: --  PVR: --  PVRI: --  CAPILLARY BLOOD GLUCOSE      POCT Blood Glucose.: 94 mg/dL (25 Feb 2025 06:11)  POCT Blood Glucose.: 126 mg/dL (25 Feb 2025 01:06)  POCT Blood Glucose.: 105 mg/dL (24 Feb 2025 17:23)    CAPILLARY BLOOD GLUCOSE      POCT Blood Glucose.: 94 mg/dL (25 Feb 2025 06:11)    I&O's Summary    24 Feb 2025 07:01  -  25 Feb 2025 07:00  --------------------------------------------------------  IN: 836.7 mL / OUT: 741 mL / NET: 95.7 mL    25 Feb 2025 07:01  -  25 Feb 2025 09:31  --------------------------------------------------------  IN: 50 mL / OUT: 45 mL / NET: 5 mL      Daily     Daily     PHYSICAL EXAMINATION:  General: Comfortable, no acute distress, cooperative with exam.  HEENT: Moist mucous membranes.  Respiratory: CTAB, normal respiratory effort, no coughing, wheezes, crackles, or rales.  CV: RRR, S1S2, no murmurs, rubs or gallops. No JVD. Distal pulses intact.  Abdominal: Soft, nontender, nondistended, no rebound or guarding, normal bowel sounds.  Neurology: AOx3, no focal neuro defects, RUVALCABA x 4.  Extremities: No pitting edema, + Peripheral pulses.  Cath site:   Tubes:    LABS:  ABG - ( 23 Feb 2025 15:22 )  pH, Arterial: 7.24  pH, Blood: x     /  pCO2: 44    /  pO2: 117   / HCO3: 19    / Base Excess: -8.3  /  SaO2: 100.0                                   9.4    11.23 )-----------( 171      ( 25 Feb 2025 00:22 )             29.3     02-25    135  |  100  |  40[H]  ----------------------------<  101[H]  4.4   |  23  |  2.66[H]    Ca    7.7[L]      25 Feb 2025 06:23  Phos  2.4     02-25  Mg     2.6     02-25    TPro  5.6[L]  /  Alb  2.8[L]  /  TBili  0.8  /  DBili  x   /  AST  35  /  ALT  16  /  AlkPhos  49  02-25    LIVER FUNCTIONS - ( 25 Feb 2025 06:23 )  Alb: 2.8 g/dL / Pro: 5.6 g/dL / ALK PHOS: 49 U/L / ALT: 16 U/L / AST: 35 U/L / GGT: x           PT/INR - ( 25 Feb 2025 00:22 )   PT: 11.1 sec;   INR: 0.97 ratio         PTT - ( 25 Feb 2025 00:22 )  PTT:29.7 sec        Urinalysis Basic - ( 25 Feb 2025 06:23 )    Color: x / Appearance: x / SG: x / pH: x  Gluc: 101 mg/dL / Ketone: x  / Bili: x / Urobili: x   Blood: x / Protein: x / Nitrite: x   Leuk Esterase: x / RBC: x / WBC x   Sq Epi: x / Non Sq Epi: x / Bacteria: x        TELEMETRY:     EKG:     IMAGING:    PATIENT: BILLY RUSSELL, MRN: [id number]    CHIEF COMPLAINT: Patient is a 74y old  Male who presents with a chief complaint of Lethargy (25 Feb 2025 08:55)      INTERVAL HISTORY/OVERNIGHT EVENTS: No overnight events. Denies abdominal pain, chest pain or SOB, cough. Has been ambulating with assistance. Oriented to person, place, and time. Breathing comfortably on room air.    REVIEW OF SYSTEMS:    Constitutional:     [ ] negative [ ] fevers [ ] chills [ ] weight loss [ ] weight gain  HEENT:                  [ ] negative [ ] dry eyes [ ] eye irritation [ ] postnasal drip [ ] nasal congestion  CV:                         [ ] negative  [ ] chest pain [ ] orthopnea [ ] palpitations [ ] murmur  Resp:                     [ ] negative [ ] cough [ ] shortness of breath [ ] dyspnea [ ] wheezing [ ] sputum [ ] hemoptysis  GI:                          [ ] negative [ ] nausea [ ] vomiting [ ] diarrhea [ ] constipation [ ] abd pain [ ] dysphagia   :                        [ ] negative [ ] dysuria [ ] nocturia [ ] hematuria [ ] increased urinary frequency  Musculoskeletal: [ ] negative [ ] back pain [ ] myalgias [ ] arthralgias [ ] fracture  Skin:                       [ ] negative [ ] rash [ ] itch  Neurological:        [ ] negative [ ] headache [ ] dizziness [ ] syncope [ ] weakness [ ] numbness  Psychiatric:           [ ] negative [ ] anxiety [ ] depression  Endocrine:            [ ] negative [ ] diabetes [ ] thyroid problem  Heme/Lymph:      [ ] negative [ ] anemia [ ] bleeding problem  Allergic/Immune: [ ] negative [ ] itchy eyes [ ] nasal discharge [ ] hives [ ] angioedema    [ ] All other systems negative  [X] Unable to assess ROS because AMS, currently A&O1.    MEDICATIONS:  MEDICATIONS  (STANDING):  aspirin  chewable 81 milliGRAM(s) Oral daily  atorvastatin 10 milliGRAM(s) Oral at bedtime  atovaquone  Suspension 1500 milliGRAM(s) Oral daily  chlorhexidine 2% Cloths 1 Application(s) Topical daily  cholecalciferol 1000 Unit(s) Oral daily  CRRT Treatment    <Continuous>  heparin   Injectable 5000 Unit(s) SubCutaneous every 8 hours  hydrocortisone sodium succinate Injectable 75 milliGRAM(s) IV Push every 8 hours  insulin lispro (ADMELOG) corrective regimen sliding scale   SubCutaneous every 6 hours  nystatin    Suspension 719593 Unit(s) Oral four times a day  pantoprazole  Injectable 40 milliGRAM(s) IV Push two times a day  piperacillin/tazobactam IVPB.. 4.5 Gram(s) IV Intermittent every 8 hours  polyethylene glycol 3350 17 Gram(s) Oral daily  PrismaSATE Dialysate BGK 4 / 2.5 5000 milliLiter(s) (1200 mL/Hr) CRRT <Continuous>  PrismaSOL Filtration BGK 4 / 2.5 5000 milliLiter(s) (1000 mL/Hr) CRRT <Continuous>  PrismaSOL Filtration BGK 4 / 2.5 5000 milliLiter(s) (200 mL/Hr) CRRT <Continuous>  senna 2 Tablet(s) Oral daily  sevelamer carbonate 1600 milliGRAM(s) Oral every 8 hours  tacrolimus    0.5 mG/mL Suspension 0.8 milliGRAM(s) Oral once  tacrolimus    0.5 mG/mL Suspension 0.8 milliGRAM(s) Oral <User Schedule>  valGANciclovir 50 mG/mL Oral Solution 450 milliGRAM(s) Oral <User Schedule>    MEDICATIONS  (PRN):      ALLERGIES: Allergies    No Known Allergies    Intolerances        OBJECTIVE:  ICU Vital Signs Last 24 Hrs  T(C): 36.8 (25 Feb 2025 07:00), Max: 37 (24 Feb 2025 22:00)  T(F): 98.2 (25 Feb 2025 07:00), Max: 98.6 (24 Feb 2025 22:00)  HR: 114 (25 Feb 2025 09:00) (94 - 119)  BP: 148/66 (25 Feb 2025 09:00) (95/44 - 168/67)  BP(mean): 95 (25 Feb 2025 09:00) (63 - 103)  ABP: --  ABP(mean): --  RR: 30 (25 Feb 2025 09:00) (15 - 31)  SpO2: 98% (25 Feb 2025 09:00) (97% - 100%)    O2 Parameters below as of 25 Feb 2025 09:00  Patient On (Oxygen Delivery Method): room air            Adult Advanced Hemodynamics Last 24 Hrs  CVP(mm Hg): --  CVP(cm H2O): --  CO: --  CI: --  PA: --  PA(mean): --  PCWP: --  SVR: --  SVRI: --  PVR: --  PVRI: --  CAPILLARY BLOOD GLUCOSE      POCT Blood Glucose.: 94 mg/dL (25 Feb 2025 06:11)  POCT Blood Glucose.: 126 mg/dL (25 Feb 2025 01:06)  POCT Blood Glucose.: 105 mg/dL (24 Feb 2025 17:23)    CAPILLARY BLOOD GLUCOSE      POCT Blood Glucose.: 94 mg/dL (25 Feb 2025 06:11)    I&O's Summary    24 Feb 2025 07:01  -  25 Feb 2025 07:00  --------------------------------------------------------  IN: 836.7 mL / OUT: 741 mL / NET: 95.7 mL    25 Feb 2025 07:01  -  25 Feb 2025 09:31  --------------------------------------------------------  IN: 50 mL / OUT: 45 mL / NET: 5 mL      Daily       Constitutional: lethargic, laying in bed, arousable  HEENT: NC/AT, PERRLA, EOMI, no conjunctival pallor or scleral icterus, MMM  Neck: Supple, no JVD  Respiratory: CTA B/L. No w/r/r.   Cardiovascular: tachycardic rate approx. 115bpm, normal S1 and S2, no m/r/g.   Gastrointestinal: mildly distended, +BS, nontender, no guarding or rebound tenderness, no palpable masses  Extremities: RUE edematous; no cyanosis, clubbing or edema. 1 bullae approx. [id number] on R forearm.  Neurological: AAOx1 (self), no CN deficits, strength and sensation intact throughout.   Skin: 2-3 bullae present on arms bilaterally. Dry, flaking skin on lower extremities.      LABS:  ABG - ( 23 Feb 2025 15:22 )  pH, Arterial: 7.24  pH, Blood: x     /  pCO2: 44    /  pO2: 117   / HCO3: 19    / Base Excess: -8.3  /  SaO2: 100.0                                   9.4    11.23 )-----------( 171      ( 25 Feb 2025 00:22 )             29.3     02-25    135  |  100  |  40[H]  ----------------------------<  101[H]  4.4   |  23  |  2.66[H]    Ca    7.7[L]      25 Feb 2025 06:23  Phos  2.4     02-25  Mg     2.6     02-25    TPro  5.6[L]  /  Alb  2.8[L]  /  TBili  0.8  /  DBili  x   /  AST  35  /  ALT  16  /  AlkPhos  49  02-25    LIVER FUNCTIONS - ( 25 Feb 2025 06:23 )  Alb: 2.8 g/dL / Pro: 5.6 g/dL / ALK PHOS: 49 U/L / ALT: 16 U/L / AST: 35 U/L / GGT: x           PT/INR - ( 25 Feb 2025 00:22 )   PT: 11.1 sec;   INR: 0.97 ratio         PTT - ( 25 Feb 2025 00:22 )  PTT:29.7 sec        Urinalysis Basic - ( 25 Feb 2025 06:23 )    Color: x / Appearance: x / SG: x / pH: x  Gluc: 101 mg/dL / Ketone: x  / Bili: x / Urobili: x   Blood: x / Protein: x / Nitrite: x   Leuk Esterase: x / RBC: x / WBC x   Sq Epi: x / Non Sq Epi: x / Bacteria: x        TELEMETRY:     EKG:     IMAGING:    PATIENT: BILLY RUSSELL, MRN: [id number]    CHIEF COMPLAINT: Patient is a 74y old  Male who presents with a chief complaint of Lethargy (25 Feb 2025 08:55)      INTERVAL HISTORY/OVERNIGHT EVENTS: No overnight events. Denies abdominal pain, chest pain or SOB, cough. Breathing comfortably on room air.    REVIEW OF SYSTEMS:    Constitutional:     [ ] negative [ ] fevers [ ] chills [ ] weight loss [ ] weight gain  HEENT:                  [ ] negative [ ] dry eyes [ ] eye irritation [ ] postnasal drip [ ] nasal congestion  CV:                         [ ] negative  [ ] chest pain [ ] orthopnea [ ] palpitations [ ] murmur  Resp:                     [ ] negative [ ] cough [ ] shortness of breath [ ] dyspnea [ ] wheezing [ ] sputum [ ] hemoptysis  GI:                          [ ] negative [ ] nausea [ ] vomiting [ ] diarrhea [ ] constipation [ ] abd pain [ ] dysphagia   :                        [ ] negative [ ] dysuria [ ] nocturia [ ] hematuria [ ] increased urinary frequency  Musculoskeletal: [ ] negative [ ] back pain [ ] myalgias [ ] arthralgias [ ] fracture  Skin:                       [ ] negative [ ] rash [ ] itch  Neurological:        [ ] negative [ ] headache [ ] dizziness [ ] syncope [ ] weakness [ ] numbness  Psychiatric:           [ ] negative [ ] anxiety [ ] depression  Endocrine:            [ ] negative [ ] diabetes [ ] thyroid problem  Heme/Lymph:      [ ] negative [ ] anemia [ ] bleeding problem  Allergic/Immune: [ ] negative [ ] itchy eyes [ ] nasal discharge [ ] hives [ ] angioedema    [ ] All other systems negative  [X] Unable to assess ROS because AMS, currently A&O1.    MEDICATIONS:  MEDICATIONS  (STANDING):  aspirin  chewable 81 milliGRAM(s) Oral daily  atorvastatin 10 milliGRAM(s) Oral at bedtime  atovaquone  Suspension 1500 milliGRAM(s) Oral daily  chlorhexidine 2% Cloths 1 Application(s) Topical daily  cholecalciferol 1000 Unit(s) Oral daily  CRRT Treatment    <Continuous>  heparin   Injectable 5000 Unit(s) SubCutaneous every 8 hours  hydrocortisone sodium succinate Injectable 75 milliGRAM(s) IV Push every 8 hours  insulin lispro (ADMELOG) corrective regimen sliding scale   SubCutaneous every 6 hours  nystatin    Suspension 507227 Unit(s) Oral four times a day  pantoprazole  Injectable 40 milliGRAM(s) IV Push two times a day  piperacillin/tazobactam IVPB.. 4.5 Gram(s) IV Intermittent every 8 hours  polyethylene glycol 3350 17 Gram(s) Oral daily  PrismaSATE Dialysate BGK 4 / 2.5 5000 milliLiter(s) (1200 mL/Hr) CRRT <Continuous>  PrismaSOL Filtration BGK 4 / 2.5 5000 milliLiter(s) (1000 mL/Hr) CRRT <Continuous>  PrismaSOL Filtration BGK 4 / 2.5 5000 milliLiter(s) (200 mL/Hr) CRRT <Continuous>  senna 2 Tablet(s) Oral daily  sevelamer carbonate 1600 milliGRAM(s) Oral every 8 hours  tacrolimus    0.5 mG/mL Suspension 0.8 milliGRAM(s) Oral once  tacrolimus    0.5 mG/mL Suspension 0.8 milliGRAM(s) Oral <User Schedule>  valGANciclovir 50 mG/mL Oral Solution 450 milliGRAM(s) Oral <User Schedule>    MEDICATIONS  (PRN):      ALLERGIES: Allergies    No Known Allergies    Intolerances        OBJECTIVE:  ICU Vital Signs Last 24 Hrs  T(C): 36.8 (25 Feb 2025 07:00), Max: 37 (24 Feb 2025 22:00)  T(F): 98.2 (25 Feb 2025 07:00), Max: 98.6 (24 Feb 2025 22:00)  HR: 114 (25 Feb 2025 09:00) (94 - 119)  BP: 148/66 (25 Feb 2025 09:00) (95/44 - 168/67)  BP(mean): 95 (25 Feb 2025 09:00) (63 - 103)  ABP: --  ABP(mean): --  RR: 30 (25 Feb 2025 09:00) (15 - 31)  SpO2: 98% (25 Feb 2025 09:00) (97% - 100%)    O2 Parameters below as of 25 Feb 2025 09:00  Patient On (Oxygen Delivery Method): room air            Adult Advanced Hemodynamics Last 24 Hrs  CVP(mm Hg): --  CVP(cm H2O): --  CO: --  CI: --  PA: --  PA(mean): --  PCWP: --  SVR: --  SVRI: --  PVR: --  PVRI: --  CAPILLARY BLOOD GLUCOSE      POCT Blood Glucose.: 94 mg/dL (25 Feb 2025 06:11)  POCT Blood Glucose.: 126 mg/dL (25 Feb 2025 01:06)  POCT Blood Glucose.: 105 mg/dL (24 Feb 2025 17:23)    CAPILLARY BLOOD GLUCOSE      POCT Blood Glucose.: 94 mg/dL (25 Feb 2025 06:11)    I&O's Summary    24 Feb 2025 07:01  -  25 Feb 2025 07:00  --------------------------------------------------------  IN: 836.7 mL / OUT: 741 mL / NET: 95.7 mL    25 Feb 2025 07:01  -  25 Feb 2025 09:31  --------------------------------------------------------  IN: 50 mL / OUT: 45 mL / NET: 5 mL      Daily       Constitutional: lethargic, laying in bed, arousable  HEENT: NC/AT, PERRLA, EOMI, no conjunctival pallor or scleral icterus, MMM  Neck: Supple, no JVD  Respiratory: CTA B/L. No w/r/r.   Cardiovascular: tachycardic rate approx. 115bpm, normal S1 and S2, no m/r/g.   Gastrointestinal: mildly distended, +BS, nontender, no guarding or rebound tenderness, no palpable masses  Extremities: RUE edematous; no cyanosis, clubbing or edema. 1 bullae approx. [id number] on R forearm.  Neurological: AAOx1 (self), no CN deficits, strength and sensation intact throughout.   Skin: 2-3 bullae present on arms bilaterally. Dry, flaking skin on lower extremities.      LABS:  ABG - ( 23 Feb 2025 15:22 )  pH, Arterial: 7.24  pH, Blood: x     /  pCO2: 44    /  pO2: 117   / HCO3: 19    / Base Excess: -8.3  /  SaO2: 100.0                                   9.4    11.23 )-----------( 171      ( 25 Feb 2025 00:22 )             29.3     02-25    135  |  100  |  40[H]  ----------------------------<  101[H]  4.4   |  23  |  2.66[H]    Ca    7.7[L]      25 Feb 2025 06:23  Phos  2.4     02-25  Mg     2.6     02-25    TPro  5.6[L]  /  Alb  2.8[L]  /  TBili  0.8  /  DBili  x   /  AST  35  /  ALT  16  /  AlkPhos  49  02-25    LIVER FUNCTIONS - ( 25 Feb 2025 06:23 )  Alb: 2.8 g/dL / Pro: 5.6 g/dL / ALK PHOS: 49 U/L / ALT: 16 U/L / AST: 35 U/L / GGT: x           PT/INR - ( 25 Feb 2025 00:22 )   PT: 11.1 sec;   INR: 0.97 ratio         PTT - ( 25 Feb 2025 00:22 )  PTT:29.7 sec        Urinalysis Basic - ( 25 Feb 2025 06:23 )    Color: x / Appearance: x / SG: x / pH: x  Gluc: 101 mg/dL / Ketone: x  / Bili: x / Urobili: x   Blood: x / Protein: x / Nitrite: x   Leuk Esterase: x / RBC: x / WBC x   Sq Epi: x / Non Sq Epi: x / Bacteria: x        TELEMETRY:     EKG:     IMAGING:

## 2025-02-25 NOTE — PHYSICAL THERAPY INITIAL EVALUATION ADULT - NSPTDISCHREC_GEN_A_CORE
if patient/family declines JP; rec is Home PT, patient would require transportation home, maximal formal/informal supervision for all functional mobility, Poly-fly wheelchair with elevating leg rests, ha, hospital bed and Patient will require a rolling walker at home due to their Dx of sepsis to help complete MRADL's. (Mobility related Activity for daily living)/Sub-acute Rehab
if patient returns home, will require assist for all functional mobility and home PT./Sub-acute Rehab

## 2025-02-25 NOTE — PROGRESS NOTE ADULT - ATTENDING COMMENTS
I have seen this patient with the fellow and agree with their assessment and plan. I was physically present for significant portions of the evaluation and management (E/M) service provided.  I agree with the above history, physical, and plan which I have reviewed and edited where appropriate.    cont CRRT  pt sick and now in CCU  pending ongoing cause of change in MS    Sarkis MARISSA Hutchins MD  Office   Contact me directly via [employer] Teams     (After 5 pm or on weekends please page the on-call fellow/attending, can check [email or web address] for schedule. Login is andreia [email or web address], schedule under [facility] medicine, psych, derm)

## 2025-02-25 NOTE — PROGRESS NOTE ADULT - CRITICAL CARE ATTENDING COMMENT
meds; prograf written for .8 bid (7.4), solucortef 75 tid, valcyte 450 biw, asa, s/c heparin, statin, mepron, zocyn, PPI,   Remains confused. Afebrile. No bowel movements. NG to suction.   HR , 90/-130/ afebrile on RA  i/O; +86   TTE 2.25: LVEF 54, LVEDD 4.9, mild RV+, PASP 34  02-25    135  |  100  |  40[H]  ----------------------------<  101[H]  4.4   |  23  |  2.66[H]    Ca    7.7[L]      25 Feb 2025 06:23  Phos  2.4     02-25  Mg     2.6     02-25    TPro  5.6[L]  /  Alb  2.8[L]  /  TBili  0.8  /  DBili  x   /  AST  35  /  ALT  16  /  AlkPhos  49  02-25                        9.4    11.23 )-----------( 171      ( 25 Feb 2025 00:22 )             29.3   Discussed with CICU team and Dr Loving yesterday.   Plan:   Place central line to dictate need for volume removal.   Dr Loving will review imaging and decide on need for further w/u for choleycystitis.   GI consult (ileus)   Neuro assessment.   Repeat UE dopplers.   Repeat BC.   Prograf as currently written. Daily trough prograf level  Marvin Campbell

## 2025-02-25 NOTE — PHYSICAL THERAPY INITIAL EVALUATION ADULT - GENERAL OBSERVATIONS, REHAB EVAL
Patient received semi-reclined in bed with ICU monitoring, PIV infusing, L Fem CVVHD and RN at bedside.
Upon entry, pt semi-supine in bed in NAD; + diaz. Pt left as received with all tubes/lines intact, bed alarm on, call bell in reach and in NAD.

## 2025-02-25 NOTE — PROGRESS NOTE ADULT - ASSESSMENT
75yo M w/ pmhx HTN, HLD, nonischemic cardiomyopathy, chronic systolic heart failure s/p HM2 LVAD (6/2017), s/p heart transplant from Hep. 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 Found to have septic shock, (2/19 BCx pseudo, 2/22 BCx NGTD). Still episodes of hypotension. Worsening NORMAN. Accepted to MICU for CRRT.      ====================NEUROLOGY=======================  # Metabolic encephalopathy.  - Likely multi-factorial etiologies: septic vs uremic vs hypercapnic, less likely meningitis  - Patient baseline AOx3. AOx1 at ED arrival, iso of infection vs metabolic derangements (uremic encephelopathy)  - CTH and angio w/o hemorrhage or stenosis   - TSH wnl / B12, CK 2200  - Fall precautions  - spot EEG 2/21: negative for seizures   - Currently A&O x1    Plan  - Monitor MS w/ abx and CRRT    ====================RESPIRATORY======================  #Metabolic acidosis  - respiratory and metabolic acidosis VpH 7.17, PCO2 57 on admission  - pH improving, likely as respiratory component has improved  - Remains on room air      ====================CARDIOVASCULAR==================  #Septic shock from pseudomonas bacteremia  - POCUS reveals no evidence of  fluid overload  - Pt is also likely intravascular depleted with ileus and poor po intake.   - S/p IVF  - Patient volume up hold off IVF, now running net even on CRRT  - Pressors: s/p levo, off 2/24    Plan  - F/u transplant ID recs - CMV ordered    # H/O heart transplant.   - Nonischemic cardiomyopathy, chronic systolic heart failure s/p HM2 LVAD (6/2017), s/p heart transplant from Hep. C donor (treated) 2/23/18   - Home Tacrolimus dose 2mg BID, Pred 15mg qd  - Intolerant of Cellcept due to leukopenia.  - Continue with home Pred 15mg qd  - Hold home Bactrim given hyperkalemia, now on Atovaquone for ppx  - ASA  - c/w home Atorvastatin (therapeutic interchange)   - HOLD home Metoprolol from 200mg with hold parameters due to hypotension (02/23)  - Tacro level daily : home regimen 2mg BID, c/w 0.8mg BID    # Paroxysmal atrial fibrillation.   - Home regimen : Eliquis 5 mg PO BID at home for hx of of afib and IJ thrombi  - EKG on admission - nsr   - [id number]  = 3  - Last Dose : 2/20 AM   - Currently on HSQ    # Hypertension (chronic)  - Hold home Toprol  mg PO QD ISO hypotension  - Hold home Losartan 75 mg PO QD due to NORMAN & hypotension.    # Vascular disorder of lower extremity.   - Extremities appear cool and dry   - c/w Local wound care by podiatry   - Podiatry recommendations - pending SHANEKA studies, consider vascular evaluation if abnormal.    ====================/RENAL========================  NORMAN on CKD  ATN 2/2 septic shock  - Known hx of CKD, Cr now uptrending  - Scr ranged from 1.3-2.1 in 2023. Most recent Scr was 1.75 on 1/30/24. On admission, Scr was 1.9 and is worsening now. UA showed trace ketones.   - No improvement with IVF, likely ATN i/s/o contrast/medications.   - S/p double lumen L fem catheter  - CRRT net even  - c/w renvela for hyperphosphatemia  - CRRT as per nephro    #Hyperkalemia (resolved)   - K 6.2 at ED arrival  - shifted in the ED, no EKG changes consistent with severe hyper K. Cr not significantly elevated form baseline, c/f RTA from bactrim and Tacro. Stable   - s/p Lokelma 10mg BID for 3 days  - f/u Tacro level daily   - Hold home bactrim - ID consult regarding Atovaquone   - c/w monitoring patient on Tele.  - CRRT as above      ====================GI/NUTRITION=====================  #Ileus  #+/- coffee ground emesis  - Ileus could potentially be source of infx  - 1 episode of coffee ground emesis, unclear if true GIB  - Hb stable   - CTAP (2/22) ileus vs partial SBO - cannot r/o GI bleed  - NGT placed set to LIS  - monitor for further episodes  - c/w PPI IV 40 BID   - c/w trending CBC Q12H  - bowel regimen: senna, miralax  - Restart DVT Ppx/ASA for now; if no more GIB and HgB stable  - if repeat episode, GI consult   - If repeat episode or worsening lactate, CT angiogram with venous phase.    ====================SKIN=============================  #Swollen arm   - RUE swollen with hx of IJ thrombus, possible infiltrated IV. LUE now swollen. + Pulses, and no motor deficits   - lower concern for compartment syndrome  - RUE Duplex without thrombus   - c/w derm recs regarding bilateral bullae on upper extremities (Likely edema bullae; please see Derm note  - Elevate R arm, warm compresses and arm precautions.    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 No manifest signs of bowel ischemia. Interval worsening of right lower lobe and right middle lobe atelectasis secondary to elevated right hemidiaphragm when compared to prior CT dated 7/11/2023. Superimposed infection in the collapsed lungs is not excluded  - s/p meropenem (2/21-2/24)  - Hold off additional doses of Vancomycin (MRSA swab negative)  - c/w home Valganciclovir for ppx renally dosed   - f/u infectious labs - Toxoplasma, EBV, Fungitell, Galactomannan, CMV, Cryptococcus, MRSA, ASCENCION virus, CMV, Crypto, ASCENCION virus  - f/u GI PCR, consider C. Diff testing   - Transplant ID recommendations   - Consider LP if MS not improving  - Warm blanket    ====================ENDOCRINE=======================  # Diabetes type 2.   - A1c 5.8  - ISS    ====================HEMATOLOGIC/DVT PPx=============  # Hx of hemolytic anemia  - hx of hemolytic anemia, follows with Dr. Rosario as outpatient  - c/w Prednisone 15 mg PO QD   - f/u hematology/oncology recs  - Monitor H&H daily.  - f/u hemolysis labs and peripheral smear    # DVT ppx  - restart heparin sq  - SCD    ====================ETHICS===========================  full code.    ======================= DISPOSITION  =====================  [X] Critical   [ ] Guarded    [ ] Stable    [X] Maintain in CICU  [ ] Downgrade to Telemetry  [ ] Discharge Home    Patient requires continuous monitoring with bedside rhythm monitoring, pulse ox monitoring, and intermittent blood gas analysis. Care plan discussed with ICU care team. Patient remained critical and at risk for life threatening decompensation.  Patient seen, examined and plan discussed with CCU team during rounds.     I have personally provided 35 minutes of critical care time excluding time spent on separate procedures, in addition to initial critical care time provided by the CICU Attending, Dr. Santiago

## 2025-02-25 NOTE — PHYSICAL THERAPY INITIAL EVALUATION ADULT - IMPAIRMENTS CONTRIBUTING IMPAIRED BED MOBILITY, REHAB EVAL
impaired balance/cognition/impaired coordination/decreased flexibility/impaired motor control/impaired postural control/decreased strength
impaired balance/cognition/impaired coordination/decreased flexibility/impaired motor control/impaired postural control/decreased strength

## 2025-02-25 NOTE — PHYSICAL THERAPY INITIAL EVALUATION ADULT - BALANCE DISTURBANCE, IDENTIFIED IMPAIRMENT CONTRIBUTE, REHAB EVAL
impaired coordination/impaired motor control/impaired postural control/decreased strength
impaired coordination/impaired motor control/impaired postural control/decreased strength

## 2025-02-25 NOTE — PHYSICAL THERAPY INITIAL EVALUATION ADULT - PERTINENT HX OF CURRENT PROBLEM, REHAB EVAL
Pt is a 74 year old male with PMH of HTN, HLD, nonischemic cardiomyopathy, chronic systolic heart failure, heart transplant, sanchez syndrome, and post transplant pAF/AFl presenting with AMS. CT head negative for acute findings. CT A/P without acute findings. Pt admitted for metabolic encephelopathy and hyperkalemia without EKG changes. EEG showed non-specific moderate diffuse cerebral dysfunction. Transferred to CICU for CVVHD.
Pt is a 74 year old male with PMH of HTN, HLD, nonischemic cardiomyopathy, chronic systolic heart failure, heart transplant, sanchez syndrome, and post transplant pAF/AFl presenting with AMS. CT head negative for acute findings. CT A/P without acute findings. Pt admitted for metabolic encephelopathy and hyperkalemia without EKG changes. EEG showed non-specific moderate diffuse cerebral dysfunction.

## 2025-02-25 NOTE — PHYSICAL THERAPY INITIAL EVALUATION ADULT - DIAGNOSIS, PT EVAL
Decreased functional mobility/capacity secondary to impairments listed below
Impairments in functional mobility, balance, and gait

## 2025-02-25 NOTE — PROGRESS NOTE ADULT - SUBJECTIVE AND OBJECTIVE BOX
[facility] Division of Kidney Diseases & Hypertension  FOLLOW UP NOTE  [phone]--------------------------------------------------------------------------------  Chief Complaint:Hyperkalemia, NORMAN on CKD on CRRT now    24 hour events/subjective:  Pt was seen and examined earlier today. Pt was moved to CICU for further management from MICU.   Pt is more awake today but still AAO x2        PAST HISTORY  --------------------------------------------------------------------------------  No significant changes to PMH, PSH, FHx, SHx, unless otherwise noted    ALLERGIES & MEDICATIONS  --------------------------------------------------------------------------------  Allergies    No Known Allergies    Intolerances      Standing Inpatient Medications  aspirin  chewable 81 milliGRAM(s) Oral daily  atorvastatin 10 milliGRAM(s) Oral at bedtime  atovaquone  Suspension 1500 milliGRAM(s) Oral daily  chlorhexidine 2% Cloths 1 Application(s) Topical daily  cholecalciferol 1000 Unit(s) Oral daily  CRRT Treatment    <Continuous>  heparin   Injectable 5000 Unit(s) SubCutaneous every 8 hours  hydrocortisone sodium succinate Injectable 75 milliGRAM(s) IV Push every 8 hours  insulin lispro (ADMELOG) corrective regimen sliding scale   SubCutaneous every 6 hours  nystatin    Suspension 649977 Unit(s) Oral four times a day  pantoprazole  Injectable 40 milliGRAM(s) IV Push two times a day  piperacillin/tazobactam IVPB.. 4.5 Gram(s) IV Intermittent every 8 hours  polyethylene glycol 3350 17 Gram(s) Oral daily  PrismaSATE Dialysate BGK 4 / 2.5 5000 milliLiter(s) CRRT <Continuous>  PrismaSOL Filtration BGK 4 / 2.5 5000 milliLiter(s) CRRT <Continuous>  PrismaSOL Filtration BGK 4 / 2.5 5000 milliLiter(s) CRRT <Continuous>  senna 2 Tablet(s) Oral daily  tacrolimus    0.5 mG/mL Suspension 0.8 milliGRAM(s) Oral <User Schedule>  valGANciclovir 50 mG/mL Oral Solution 450 milliGRAM(s) Oral <User Schedule>    PRN Inpatient Medications      REVIEW OF SYSTEMS  --------------------------------------------------------------------------------  ROS is limited due to clinical status.     VITALS/PHYSICAL EXAM  --------------------------------------------------------------------------------  T(C): 36.9 (02-25-25 @ 11:00), Max: 37 (02-24-25 @ 22:00)  HR: 115 (02-25-25 @ 11:00) (97 - 119)  BP: 144/64 (02-25-25 @ 11:00) (95/44 - 168/67)  RR: 19 (02-25-25 @ 11:00) (16 - 31)  SpO2: 100% (02-25-25 @ 11:00) (97% - 100%)  Wt(kg): --        02-24-25 @ 07:01  -  02-25-25 @ 07:00  --------------------------------------------------------  IN: 836.7 mL / OUT: 741 mL / NET: 95.7 mL    02-25-25 @ 07:01  -  02-25-25 @ 12:29  --------------------------------------------------------  IN: 75 mL / OUT: 69 mL / NET: 6 mL      Physical Exam:  Gen: NAD  Pulm: CTA B/L  CV: S1S2  Abd: Soft, +BS   Ext: No LE edema B/L  Neuro: AAO x1-2 - Interval improvement in mental status.   Skin: Warm and dry  Lfemoral non- tunnelled HD cath in place - being used for CRRT.     LABS/STUDIES  --------------------------------------------------------------------------------              9.4    11.23 >-----------<  171      [02-25-25 @ 00:22]              29.3     135  |  100  |  40  ----------------------------<  101      [02-25-25 @ 06:23]  4.4   |  23  |  2.66        Ca     7.7     [02-25-25 @ 06:23]      Mg     2.6     [02-25-25 @ 06:23]      Phos  2.4     [02-25-25 @ 06:23]    TPro  5.6  /  Alb  2.8  /  TBili  0.8  /  DBili  x   /  AST  35  /  ALT  16  /  AlkPhos  49  [02-25-25 @ 06:23]    PT/INR: PT 11.1 , INR 0.97       [02-25-25 @ 00:22]  PTT: 29.7       [02-25-25 @ 00:22]    Uric acid 2.0      [02-24-25 @ 17:31]        [02-24-25 @ 17:31]    Creatinine Trend:  SCr 2.66 [02-25 @ 06:23]  SCr 3.09 [02-25 @ 00:22]  SCr 3.19 [02-24 @ 17:31]  SCr 3.86 [02-24 @ 11:49]  SCr 5.32 [02-24 @ 06:39]    TSH 2.57      [02-19-25 @ 17:28]  Lipid: chol 95, TG 65, HDL 55, LDL --      [02-20-25 @ 05:35]      Syphilis Screen (Treponema Pallidum Ab) Negative      [02-21-25 @ 09:38]

## 2025-02-25 NOTE — PROGRESS NOTE ADULT - ASSESSMENT
74yo M pmhx HTN, HLD, nonischemic cardiomyopathy, chronic systolic heart failure s/p HM2 LVAD (6/2017), s/p heart transplant from Hep. 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 Pt here with change in MS, hyperkalemia and norman.   Patient was given Calcium Gluconate 2g, D50/Insulin 5 unit, 40 mg IV lasix, Lokelma 10mg.   Vancomycin and Zosyn       Hyperkalemia:  Pt with K level of 6.7 that improved to 6.1 after medical management with additional dose of insulin given.   K 5.6 initially   On CRRT now. Serum K is better now.   Monitor labs.     Stage 3 chronic kidney disease: with superimposed NORMAN:  Patient with known history of CKD. Upon HIE review, Scr ranged from 1.3-2.1 in 2023. Most recent Scr was 1.75 on 1/30/24.   Scr is worsening now.  2.49 --->2.75--> 4.34--> 4.65---> 6.64 -->6.68 ---> 6.89.  pt was started on CRRT 2/23  CPK is down trending. - Pt denies any seizure before coming to hospital. (Though not reliable history)  NORMAN likely in setting of ? underlying infection related and hemodynamic mediated ATN. Phos level was high. TLS needs to be ruled out  Transplant ID reccs appreciated.   Pt has respiratory acidosis as well along with metabolic acidosis.   neurology is on board.     PLAN:  C/w CRRT. bags adjusted as per latest labs.   Phos binders work with PO diet only. - Phos level is 6.2 ( improving with CRRT)---> 2.4  Resp acidosis management as per primary team.   Pt has urine ketones -   Monitor labs, daily wts and I/Os.   Check tacro trough daily. - today 7.4  on empirical Abx. Infectious work up as per primary team and ID.   Dose medications as per eGFR for now.       Called pt's care taker - Tahira Quintero - explained his condition from nephrology stand point. She verbalized her understanding and consented for hemodialysis/ CRRT, if needed.   CRRT was initiated on 2/23 72yo M pmhx HTN, HLD, nonischemic cardiomyopathy, chronic systolic heart failure s/p HM2 LVAD (6/2017), s/p heart transplant from Hep. 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 Pt here with change in MS, hyperkalemia and norman.   Patient was given Calcium Gluconate 2g, D50/Insulin 5 unit, 40 mg IV lasix, Lokelma 10mg.   Vancomycin and Zosyn       Hyperkalemia:  Pt with K level of 6.7 that improved to 6.1 after medical management with additional dose of insulin given.   K 5.6 initially   On CRRT now. Serum K is better now.   Monitor labs.     Stage 3 chronic kidney disease: with superimposed NORMAN:  Patient with known history of CKD. Upon HIE review, Scr ranged from 1.3-2.1 in 2023. Most recent Scr was 1.75 on 1/30/24.   Scr is worsening now.  2.49 --->2.75--> 4.34--> 4.65---> 6.64 -->6.68 ---> 6.89.  pt was started on CRRT 2/23  CPK is down trending. - Pt denies any seizure before coming to hospital. (Though not reliable history)  NORMAN likely in setting of ? underlying infection related and hemodynamic mediated ATN. Phos level was high. uric acid low, less likely TLS but uric acid was checked post CRRT  Transplant ID reccs appreciated.   Pt has respiratory acidosis as well along with metabolic acidosis.   neurology is on board.     PLAN:  C/w CRRT. bags adjusted as per latest labs.   Phos binders work with PO diet only. - Phos level is 6.2 ( improving with CRRT)---> 2.4  Resp acidosis management as per primary team.   Pt has urine ketones -   Monitor labs, daily wts and I/Os.   Check tacro trough daily. - today 7.4  on empirical Abx. Infectious work up as per primary team and ID.   Dose medications as per eGFR for now.       Called pt's care taker - Tahira Quintero - explained his condition from nephrology stand point. She verbalized her understanding and consented for hemodialysis/ CRRT, if needed.   CRRT was initiated on 2/23

## 2025-02-25 NOTE — PROGRESS NOTE ADULT - ATTENDING COMMENTS
Transfer from MICU to CICU care - case discussed with MICU team  Heart transplant recipient (2018)  Hypervolemic  Now with NORMAN on CKD - initiating HD  Heart Failure follow-up appreciated

## 2025-02-25 NOTE — PROGRESS NOTE ADULT - SUBJECTIVE AND OBJECTIVE BOX
Follow Up: Bacteremia     Interval History/ROS: afebrile, on RA, labs largely unchanged, no overnight events    Allergies  No Known Allergies        ANTIMICROBIALS:  atovaquone  Suspension 1500 daily  nystatin    Suspension 890244 four times a day  piperacillin/tazobactam IVPB.. 4.5 every 8 hours  valGANciclovir 50 mG/mL Oral Solution 450 <User Schedule>      OTHER MEDS:  MEDICATIONS  (STANDING):  aspirin  chewable 81 daily  atorvastatin 10 at bedtime  heparin   Injectable 5000 every 8 hours  hydrocortisone sodium succinate Injectable 75 every 8 hours  insulin lispro (ADMELOG) corrective regimen sliding scale  every 6 hours  pantoprazole  Injectable 40 two times a day  polyethylene glycol 3350 17 daily  senna 2 daily  tacrolimus    0.5 mG/mL Suspension 0.8 <User Schedule>      Vital Signs Last 24 Hrs  T(C): 36.9 (25 Feb 2025 11:00), Max: 37 (24 Feb 2025 22:00)  T(F): 98.4 (25 Feb 2025 11:00), Max: 98.6 (24 Feb 2025 22:00)  HR: 122 (25 Feb 2025 13:00) (97 - 122)  BP: 160/70 (25 Feb 2025 13:00) (95/44 - 168/67)  BP(mean): 100 (25 Feb 2025 13:00) (63 - 103)  RR: 22 (25 Feb 2025 13:00) (16 - 31)  SpO2: 98% (25 Feb 2025 13:00) (97% - 100%)    Parameters below as of 25 Feb 2025 13:00  Patient On (Oxygen Delivery Method): room air        PHYSICAL EXAM:                                  9.4    11.23 )-----------( 171      ( 25 Feb 2025 00:22 )             29.3       02-25    133[L]  |  101  |  33[H]  ----------------------------<  118[H]  4.5   |  23  |  2.20[H]    Ca    7.9[L]      25 Feb 2025 12:20  Phos  2.3     02-25  Mg     2.5     02-25    TPro  5.5[L]  /  Alb  2.6[L]  /  TBili  1.0  /  DBili  x   /  AST  33  /  ALT  14  /  AlkPhos  51  02-25      MICROBIOLOGY:  v    Culture - Blood (collected 24 Feb 2025 00:35)  Source: .Blood Blood-Peripheral  Preliminary Report (25 Feb 2025 03:02):    No growth at 24 hours    Culture - Blood (collected 24 Feb 2025 00:20)  Source: .Blood Blood-Peripheral  Preliminary Report (25 Feb 2025 03:02):    No growth at 24 hours    Culture - Blood (collected 22 Feb 2025 03:11)  Source: .Blood Blood-Peripheral  Preliminary Report (25 Feb 2025 09:01):    No growth at 72 Hours            Toxoplasma IgG Screen: 10.70 IU/mL (02-21-25 @ 09:38)    CMVPCR Log: NotDetec Qat21SE/mL (02-22 @ 07:33)  Rapid RVP Result: NotDetec (02-19 @ 17:28)        RADIOLOGY:   Follow Up: Bacteremia     Interval History/ROS: afebrile, on RA, labs largely unchanged, no overnight events, remains confused    Allergies  No Known Allergies        ANTIMICROBIALS:  atovaquone  Suspension 1500 daily  nystatin    Suspension 870392 four times a day  piperacillin/tazobactam IVPB.. 4.5 every 8 hours  valGANciclovir 50 mG/mL Oral Solution 450 <User Schedule>      OTHER MEDS:  MEDICATIONS  (STANDING):  aspirin  chewable 81 daily  atorvastatin 10 at bedtime  heparin   Injectable 5000 every 8 hours  hydrocortisone sodium succinate Injectable 75 every 8 hours  insulin lispro (ADMELOG) corrective regimen sliding scale  every 6 hours  pantoprazole  Injectable 40 two times a day  polyethylene glycol 3350 17 daily  senna 2 daily  tacrolimus    0.5 mG/mL Suspension 0.8 <User Schedule>      Vital Signs Last 24 Hrs  T(C): 36.9 (25 Feb 2025 11:00), Max: 37 (24 Feb 2025 22:00)  T(F): 98.4 (25 Feb 2025 11:00), Max: 98.6 (24 Feb 2025 22:00)  HR: 122 (25 Feb 2025 13:00) (97 - 122)  BP: 160/70 (25 Feb 2025 13:00) (95/44 - 168/67)  BP(mean): 100 (25 Feb 2025 13:00) (63 - 103)  RR: 22 (25 Feb 2025 13:00) (16 - 31)  SpO2: 98% (25 Feb 2025 13:00) (97% - 100%)    Parameters below as of 25 Feb 2025 13:00  Patient On (Oxygen Delivery Method): room air        PHYSICAL EXAM:    General: Patient in NAD  HEENT: NCAT, EOMI, PERRL, no oral lesions  CV: S1+S2, no m/r/g appreciated   Lungs: No respiratory distress, CTAB  Abd: Soft, nontender, no guarding, no rebound tenderness, + bowel sounds   : No suprapubic tenderness  Neuro: Alert and oriented X2. Moves all extremities against gravity.  Ext: No cyanosis, no edema  Skin: No rash, no phlebitis, surgical wounds clean                                  9.4    11.23 )-----------( 171      ( 25 Feb 2025 00:22 )             29.3       02-25    133[L]  |  101  |  33[H]  ----------------------------<  118[H]  4.5   |  23  |  2.20[H]    Ca    7.9[L]      25 Feb 2025 12:20  Phos  2.3     02-25  Mg     2.5     02-25    TPro  5.5[L]  /  Alb  2.6[L]  /  TBili  1.0  /  DBili  x   /  AST  33  /  ALT  14  /  AlkPhos  51  02-25      MICROBIOLOGY:  v    Culture - Blood (collected 24 Feb 2025 00:35)  Source: .Blood Blood-Peripheral  Preliminary Report (25 Feb 2025 03:02):    No growth at 24 hours    Culture - Blood (collected 24 Feb 2025 00:20)  Source: .Blood Blood-Peripheral  Preliminary Report (25 Feb 2025 03:02):    No growth at 24 hours    Culture - Blood (collected 22 Feb 2025 03:11)  Source: .Blood Blood-Peripheral  Preliminary Report (25 Feb 2025 09:01):    No growth at 72 Hours            Toxoplasma IgG Screen: 10.70 IU/mL (02-21-25 @ 09:38)    CMVPCR Log: NotDetec [id number]/mL (02-22 @ 07:33)  Rapid RVP Result: NotDetec (02-19 @ 17:28)        RADIOLOGY: Follow Up: Bacteremia     Interval History/ROS: afebrile, on RA, labs largely unchanged, no overnight events, remains confused    Allergies  No Known Allergies    ANTIMICROBIALS:  atovaquone  Suspension 1500 daily  nystatin    Suspension 108829 four times a day  piperacillin/tazobactam IVPB.. 4.5 every 8 hours  valGANciclovir 50 mG/mL Oral Solution 450 <User Schedule>      OTHER MEDS:  MEDICATIONS  (STANDING):  aspirin  chewable 81 daily  atorvastatin 10 at bedtime  heparin   Injectable 5000 every 8 hours  hydrocortisone sodium succinate Injectable 75 every 8 hours  insulin lispro (ADMELOG) corrective regimen sliding scale  every 6 hours  pantoprazole  Injectable 40 two times a day  polyethylene glycol 3350 17 daily  senna 2 daily  tacrolimus    0.5 mG/mL Suspension 0.8 <User Schedule>      Vital Signs Last 24 Hrs  T(C): 36.9 (25 Feb 2025 11:00), Max: 37 (24 Feb 2025 22:00)  T(F): 98.4 (25 Feb 2025 11:00), Max: 98.6 (24 Feb 2025 22:00)  HR: 122 (25 Feb 2025 13:00) (97 - 122)  BP: 160/70 (25 Feb 2025 13:00) (95/44 - 168/67)  BP(mean): 100 (25 Feb 2025 13:00) (63 - 103)  RR: 22 (25 Feb 2025 13:00) (16 - 31)  SpO2: 98% (25 Feb 2025 13:00) (97% - 100%)    Parameters below as of 25 Feb 2025 13:00  Patient On (Oxygen Delivery Method): room air        PHYSICAL EXAM:    General: Patient in NAD  HEENT: NCAT, EOMI, PERRL, no oral lesions  CV: S1+S2, no m/r/g appreciated   Lungs: No respiratory distress, CTAB  Abd: Soft, nontender, no guarding, no rebound tenderness, + bowel sounds   : No suprapubic tenderness  Neuro: Alert and oriented X2. Moves all extremities against gravity.  Ext: No cyanosis, no edema  Skin: No rash, no phlebitis, surgical wounds clean                                  9.4    11.23 )-----------( 171      ( 25 Feb 2025 00:22 )             29.3       02-25    133[L]  |  101  |  33[H]  ----------------------------<  118[H]  4.5   |  23  |  2.20[H]    Ca    7.9[L]      25 Feb 2025 12:20  Phos  2.3     02-25  Mg     2.5     02-25    TPro  5.5[L]  /  Alb  2.6[L]  /  TBili  1.0  /  DBili  x   /  AST  33  /  ALT  14  /  AlkPhos  51  02-25      MICROBIOLOGY:  v    Culture - Blood (collected 24 Feb 2025 00:35)  Source: .Blood Blood-Peripheral  Preliminary Report (25 Feb 2025 03:02):    No growth at 24 hours    Culture - Blood (collected 24 Feb 2025 00:20)  Source: .Blood Blood-Peripheral  Preliminary Report (25 Feb 2025 03:02):    No growth at 24 hours    Culture - Blood (collected 22 Feb 2025 03:11)  Source: .Blood Blood-Peripheral  Preliminary Report (25 Feb 2025 09:01):    No growth at 72 Hours    Toxoplasma IgG Screen: 10.70 IU/mL (02-21-25 @ 09:38)    CMVPCR Log: NotDetec [id number]/mL (02-22 @ 07:33)  Rapid RVP Result: NotDetec (02-19 @ 17:28)    RADIOLOGY:    <The imaging below has been reviewed and visualized by me independently. Findings as detailed in report below>    < from: Xray Abdomen 1 View PORTABLE -Urgent (Xray Abdomen 1 View PORTABLE -Urgent .) (02.24.25 @ 11:28) >  IMPRESSION:  Diffusely dilated air-filled loops of bowel, not significantly changed   when compared to prior exam..    < end of copied text >

## 2025-02-25 NOTE — PHYSICAL THERAPY INITIAL EVALUATION ADULT - GAIT TRAINING, PT EVAL
GOAL: Pt will ambulate 100ft with contact-guard assistance with single point cane in 2 weeks
GOAL: Patient will ambulate 10 feet with maxA and least restrictive assistive device in 2 weeks

## 2025-02-25 NOTE — PROGRESS NOTE ADULT - SUBJECTIVE AND OBJECTIVE BOX
ADVANCED HEART FAILURE & TRANSPLANT  - PROGRESS NOTE  *To reach the NS2 Team from 8am to 5pm, please call [phone]   ___________________________________________________________________________    Medications:  aspirin  chewable 81 milliGRAM(s) Oral daily  atorvastatin 10 milliGRAM(s) Oral at bedtime  atovaquone  Suspension 1500 milliGRAM(s) Oral daily  chlorhexidine 2% Cloths 1 Application(s) Topical daily  cholecalciferol 1000 Unit(s) Oral daily  CRRT Treatment    <Continuous>  heparin   Injectable 5000 Unit(s) SubCutaneous every 8 hours  hydrocortisone sodium succinate Injectable 75 milliGRAM(s) IV Push every 8 hours  insulin lispro (ADMELOG) corrective regimen sliding scale   SubCutaneous every 6 hours  nystatin    Suspension 415023 Unit(s) Oral four times a day  pantoprazole  Injectable 40 milliGRAM(s) IV Push two times a day  piperacillin/tazobactam IVPB.. 4.5 Gram(s) IV Intermittent every 8 hours  polyethylene glycol 3350 17 Gram(s) Oral daily  PrismaSATE Dialysate BGK 4 / 2.5 5000 milliLiter(s) CRRT <Continuous>  PrismaSOL Filtration BGK 4 / 2.5 5000 milliLiter(s) CRRT <Continuous>  PrismaSOL Filtration BGK 4 / 2.5 5000 milliLiter(s) CRRT <Continuous>  senna 2 Tablet(s) Oral daily  sevelamer carbonate 1600 milliGRAM(s) Oral every 8 hours  tacrolimus    0.5 mG/mL Suspension 0.8 milliGRAM(s) Oral <User Schedule>  valGANciclovir 50 mG/mL Oral Solution 450 milliGRAM(s) Oral <User Schedule>      Physical Exam:    Vitals:  Vital Signs Last 24 Hours  T(C): 36.8 (02-25-25 @ 07:00), Max: 37 (02-24-25 @ 22:00)  HR: 119 (02-25-25 @ 07:00) (94 - 119)  BP: 141/60 (02-25-25 @ 07:00) (95/44 - 168/67)  RR: 19 (02-25-25 @ 07:00) (15 - 31)  SpO2: 98% (02-25-25 @ 07:00) (98% - 100%)        I&O's Summary    24 Feb 2025 07:01  -  25 Feb 2025 07:00  --------------------------------------------------------  IN: 811.7 mL / OUT: 726 mL / NET: 85.7 mL      Labs:                        9.4    11.23 )-----------( 171      ( 25 Feb 2025 00:22 )             29.3     02-25    135  |  100  |  40[H]  ----------------------------<  101[H]  4.4   |  23  |  2.66[H]    Ca    7.7[L]      25 Feb 2025 06:23  Phos  2.4     02-25  Mg     2.6     02-25    TPro  5.6[L]  /  Alb  2.8[L]  /  TBili  0.8  /  DBili  x   /  AST  35  /  ALT  16  /  AlkPhos  49  02-25    PT/INR - ( 25 Feb 2025 00:22 )   PT: 11.1 sec;   INR: 0.97 ratio         PTT - ( 25 Feb 2025 00:22 )  PTT:29.7 sec          Lactate Dehydrogenase, Serum: 327 U/L (02-24 @ 17:31)

## 2025-02-25 NOTE — PHYSICAL THERAPY INITIAL EVALUATION ADULT - MANUAL MUSCLE TESTING RESULTS, REHAB EVAL
generalized deconditioning noted throughout from gross motor assessment
Unable to formally assess due to cognitive impairment

## 2025-02-25 NOTE — PROGRESS NOTE ADULT - ATTENDING COMMENTS
73 year old male PMH HTN, HLD, CKD, nonischemic cardiomyopathy, chronic systolic heart failure s/p HM2 LVAD (6/2017), s/p heart transplant from Hep. 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     At this point suspect that Pseudomonas bacteremia was secondary to either a respiratory source or gastrointestinal translocation.  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    Would continue Zosyn for coverage    Encephalopathy slowly improving, suspect it is likely secondary to uremia    I will continue to follow. Please feel free to contact me with any further questions.    Gianluca Loving M.D.  [facility] Division of Infectious Disease  8AM-5PM Monday - Friday: Available on [employer] Teams  After Hours and Holidays (or if no response on [employer] Teams): Please contact the Infectious Diseases Office at [phone]    The above assessment and plan were discussed with Dr Campbell (transplant cardiology)

## 2025-02-25 NOTE — PHYSICAL THERAPY INITIAL EVALUATION ADULT - PATIENT PROFILE REVIEW, REHAB EVAL
yes
PT initial evaluation received and chart review completed. Pt agreeable to participate in PT evaluation./yes

## 2025-02-26 DIAGNOSIS — Z12.5 ENCOUNTER FOR SCREENING FOR MALIGNANT NEOPLASM OF PROSTATE: ICD-10-CM

## 2025-02-26 NOTE — PROGRESS NOTE ADULT - ATTENDING COMMENTS
73 year old male PMH HTN, HLD, CKD, nonischemic cardiomyopathy, chronic systolic heart failure s/p HM2 LVAD (6/2017), s/p heart transplant from Hep. 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     At this point suspect that Pseudomonas bacteremia was secondary to either a respiratory source or gastrointestinal translocation.  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    Would continue Zosyn for coverage    Encephalopathy slowly improving, suspect it is likely secondary to uremia    f/u CT C/A/P - if continued erythema / drainage from sternal papule would image with MRI to evaluate underlying sternoclavicular joint.     Agree with Brain MRI    I will continue to follow. Please feel free to contact me with any further questions.    Gianluca Loving M.D.  [facility] Division of Infectious Disease  8AM-5PM Monday - Friday: Available on [employer] Teams  After Hours and Holidays (or if no response on [employer] Teams): Please contact the Infectious Diseases Office at [phone]    The above assessment and plan were discussed with CICU Team

## 2025-02-26 NOTE — PROGRESS NOTE ADULT - ATTENDING COMMENTS
I have seen this patient with the fellow and agree with their assessment and plan. I was physically present for significant portions of the evaluation and management (E/M) service provided.  I agree with the above history, physical, and plan which I have reviewed and edited where appropriate.    Sarkis Hutchins MD  Office   Contact me directly via [employer] Teams     (After 5 pm or on weekends please page the on-call fellow/attending, can check [email or web address] for schedule. Login is andreia [email or web address], schedule under [facility] medicine, psych, derm)

## 2025-02-26 NOTE — DIETITIAN INITIAL EVALUATION ADULT - NSFNSPHYEXAMSKINFT_GEN_A_CORE
Per Wound Care note 2/21:  Right Arm - Wound secondary to  Swelling  Bilateral Heels - Fissures - Appreciate Podiatric Consult

## 2025-02-26 NOTE — PROGRESS NOTE ADULT - ASSESSMENT
75yo M w/ pmhx HTN, HLD, nonischemic cardiomyopathy, chronic systolic heart failure s/p HM2 LVAD (6/2017), s/p heart transplant from Hep. 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 Found to have septic shock, (2/19 BCx pseudo, 2/22 BCx NGTD). Still episodes of hypotension. Worsening NORMAN. Accepted to MICU for CRRT.      ====================NEUROLOGY=======================  # Metabolic encephalopathy.  - Likely multi-factorial etiologies: septic vs uremic vs hypercapnic, less likely meningitis  - Patient baseline AOx3. AOx1 at ED arrival, iso of infection vs metabolic derangements (uremic encephelopathy)  - CTH and angio w/o hemorrhage or stenosis   - TSH wnl / B12, CK 2200  - Fall precautions  - spot EEG 2/21: negative for seizures   - Currently A&O x1    Plan  - Monitor MS w/ abx and CRRT    ====================RESPIRATORY======================  #Metabolic acidosis  - respiratory and metabolic acidosis VpH 7.17, PCO2 57 on admission  - pH improving, likely as respiratory component has improved  - Remains on room air      ====================CARDIOVASCULAR==================  #Septic shock from pseudomonas bacteremia  - POCUS reveals no evidence of  fluid overload  - Pt is also likely intravascular depleted with ileus and poor po intake.   - S/p IVF  - Patient volume up hold off IVF, now running net even on CRRT  - Pressors: s/p levo, off 2/24    Plan  - F/u transplant ID recs - CMV ordered    # H/O heart transplant.   - Nonischemic cardiomyopathy, chronic systolic heart failure s/p HM2 LVAD (6/2017), s/p heart transplant from Hep. C donor (treated) 2/23/18   - Home Tacrolimus dose 2mg BID, Pred 15mg qd  - Intolerant of Cellcept due to leukopenia.  - Continue with home Pred 15mg qd  - Hold home Bactrim given hyperkalemia, now on Atovaquone for ppx  - ASA  - c/w home Atorvastatin (therapeutic interchange)   - HOLD home Metoprolol from 200mg with hold parameters due to hypotension (02/23)  - Tacro level daily : home regimen 2mg BID, c/w 0.8mg BID    # Paroxysmal atrial fibrillation.   - Home regimen : Eliquis 5 mg PO BID at home for hx of of afib and IJ thrombi  - EKG on admission - nsr   - [id number]  = 3  - Last Dose : 2/20 AM   - Currently on HSQ    # Hypertension (chronic)  - Hold home Toprol  mg PO QD ISO hypotension  - Hold home Losartan 75 mg PO QD due to NORMAN & hypotension.    # Vascular disorder of lower extremity.   - Extremities appear cool and dry   - c/w Local wound care by podiatry   - Podiatry recommendations - pending SHANEKA studies, consider vascular evaluation if abnormal.    ====================/RENAL========================  NORMAN on CKD  ATN 2/2 septic shock  - Known hx of CKD, Cr now uptrending  - Scr ranged from 1.3-2.1 in 2023. Most recent Scr was 1.75 on 1/30/24. On admission, Scr was 1.9 and is worsening now. UA showed trace ketones.   - No improvement with IVF, likely ATN i/s/o contrast/medications.   - S/p double lumen L fem catheter  - CRRT net even  - c/w renvela for hyperphosphatemia  - CRRT as per nephro    #Hyperkalemia (resolved)   - K 6.2 at ED arrival  - shifted in the ED, no EKG changes consistent with severe hyper K. Cr not significantly elevated form baseline, c/f RTA from bactrim and Tacro. Stable   - s/p Lokelma 10mg BID for 3 days  - f/u Tacro level daily   - Hold home bactrim - ID consult regarding Atovaquone   - c/w monitoring patient on Tele.  - CRRT as above      ====================GI/NUTRITION=====================  #Ileus  #+/- coffee ground emesis  - Ileus could potentially be source of infx  - 1 episode of coffee ground emesis, unclear if true GIB  - Hb stable   - CTAP (2/22) ileus vs partial SBO - cannot r/o GI bleed  - NGT placed set to LIS  - monitor for further episodes  - c/w PPI IV 40 BID   - c/w trending CBC Q12H  - bowel regimen: senna, miralax  - Restart DVT Ppx/ASA for now; if no more GIB and HgB stable  - if repeat episode, GI consult   - If repeat episode or worsening lactate, CT angiogram with venous phase.    ====================SKIN=============================  #Swollen arm   - RUE swollen with hx of IJ thrombus, possible infiltrated IV. LUE now swollen. + Pulses, and no motor deficits   - lower concern for compartment syndrome  - RUE Duplex without thrombus   - c/w derm recs regarding bilateral bullae on upper extremities (Likely edema bullae; please see Derm note  - Elevate R arm, warm compresses and arm precautions.    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 No manifest signs of bowel ischemia. Interval worsening of right lower lobe and right middle lobe atelectasis secondary to elevated right hemidiaphragm when compared to prior CT dated 7/11/2023. Superimposed infection in the collapsed lungs is not excluded  - s/p meropenem (2/21-2/24)  - Hold off additional doses of Vancomycin (MRSA swab negative)  - c/w home Valganciclovir for ppx renally dosed   - f/u infectious labs - Toxoplasma, EBV, Fungitell, Galactomannan, CMV, Cryptococcus, MRSA, ASCENCION virus, CMV, Crypto, ASCENCION virus  - f/u GI PCR, consider C. Diff testing   - Transplant ID recommendations   - Consider LP if MS not improving  - Warm blanket    ====================ENDOCRINE=======================  # Diabetes type 2.   - A1c 5.8  - ISS    ====================HEMATOLOGIC/DVT PPx=============  # Hx of hemolytic anemia  - hx of hemolytic anemia, follows with Dr. Rosario as outpatient  - c/w Prednisone 15 mg PO QD   - f/u hematology/oncology recs  - Monitor H&H daily.  - f/u hemolysis labs and peripheral smear    # DVT ppx  - restart heparin sq  - SCD    ====================ETHICS===========================  full code.    ======================= DISPOSITION  =====================  [X] Critical   [ ] Guarded    [ ] Stable    [X] Maintain in CICU  [ ] Downgrade to Telemetry  [ ] Discharge Home    Patient requires continuous monitoring with bedside rhythm monitoring, pulse ox monitoring, and intermittent blood gas analysis. Care plan discussed with ICU care team. Patient remained critical and at risk for life threatening decompensation.  Patient seen, examined and plan discussed with CCU team during rounds.     I have personally provided 35 minutes of critical care time excluding time spent on separate procedures, in addition to initial critical care time provided by the CICU Attending, Dr. Santiago       75yo M w/ pmhx HTN, HLD, nonischemic cardiomyopathy, chronic systolic heart failure s/p HM2 LVAD (6/2017), s/p heart transplant from Hep. 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 Found to have septic shock, (2/19 BCx pseudo, 2/22 BCx NGTD). Still episodes of hypotension. Worsening NORMAN. Accepted to MICU for CRRT.      ====================NEUROLOGY=======================  # Metabolic encephalopathy.  - Likely multi-factorial etiologies: septic vs uremic vs hypercapnic, less likely meningitis  - Patient baseline AOx3. AOx1 at ED arrival, iso of infection vs metabolic derangements (uremic encephelopathy)  - CTH and angio w/o hemorrhage or stenosis   - TSH wnl / B12, CK 2200  - Fall precautions  - spot EEG 2/21: negative for seizures   - Currently A&O xO    Plan  - Monitor MS w/ abx and CRRT  - Neurology consulted     ====================RESPIRATORY======================  #Metabolic acidosis  - respiratory and metabolic acidosis VpH 7.17, PCO2 57 on admission  - pH improving, likely as respiratory component has improved  - Remains on room air      ====================CARDIOVASCULAR==================  #Septic shock from pseudomonas bacteremia  - POCUS reveals no evidence of  fluid overload  - Pt is also likely intravascular depleted with ileus and poor po intake.   - S/p IVF  - Patient volume up hold off IVF, now running net even on CRRT  - Pressors: s/p levo, off 2/24    Plan  - F/u transplant ID recs - CMV ordered    # H/O heart transplant.   - Nonischemic cardiomyopathy, chronic systolic heart failure s/p HM2 LVAD (6/2017), s/p heart transplant from Hep. C donor (treated) 2/23/18   - Home Tacrolimus dose 2mg BID, Pred 15mg qd  - Intolerant of Cellcept due to leukopenia.  - Continue with home Pred 15mg qd  - Hold home Bactrim given hyperkalemia, now on Atovaquone for ppx  - ASA  - c/w home Atorvastatin (therapeutic interchange)   - HOLD home Metoprolol from 200mg with hold parameters due to hypotension (02/23)  - Tacro level daily : home regimen 2mg BID, c/w 0.8mg BID-> will adjust by level today     # Paroxysmal atrial fibrillation.   - Home regimen : Eliquis 5 mg PO BID at home for hx of of afib and IJ thrombi  - EKG on admission - nsr   - [id number]  = 3  - Last Dose : 2/20 AM   - Currently on HSQ    # Hypertension (chronic)  - Hold home Toprol  mg PO QD ISO hypotension  - Hold home Losartan 75 mg PO QD due to NORMAN & hypotension.    # Vascular disorder of lower extremity.   - Extremities appear cool and dry   - c/w Local wound care by podiatry   - Podiatry recommendations - pending SHANEKA studies, consider vascular evaluation if abnormal.    ====================/RENAL========================  NORMAN on CKD  ATN 2/2 septic shock  - Known hx of CKD, Cr now uptrending  - Scr ranged from 1.3-2.1 in 2023. Most recent Scr was 1.75 on 1/30/24. On admission, Scr was 1.9 and is worsening now. UA showed trace ketones.   - No improvement with IVF, likely ATN i/s/o contrast/medications.   - S/p double lumen L fem catheter  - CRRT net even  - c/w renvela for hyperphosphatemia  - CRRT as per nephro    #Hyperkalemia (resolved)   - K 6.2 at ED arrival  - shifted in the ED, no EKG changes consistent with severe hyper K. Cr not significantly elevated form baseline, c/f RTA from bactrim and Tacro. Stable   - s/p Lokelma 10mg BID for 3 days  - f/u Tacro level daily   - Hold home bactrim - ID consult regarding Atovaquone   - c/w monitoring patient on Tele.  - CRRT as above      ====================GI/NUTRITION=====================  #Ileus  #+/- coffee ground emesis  - Ileus could potentially be source of infx  - 1 episode of coffee ground emesis, unclear if true GIB  - Hb stable   - CTAP (2/22) ileus vs partial SBO - cannot r/o GI bleed  - NGT placed set to LIS  - monitor for further episodes  - c/w PPI IV 40 BID   - c/w trending CBC Q12H  - bowel regimen: senna, miralax  - Restart DVT Ppx/ASA for now; if no more GIB and HgB stable  - if repeat episode, GI consult   - If repeat episode or worsening lactate, CT angiogram with venous phase.    ====================SKIN=============================  #Swollen arm   - RUE swollen with hx of IJ thrombus, possible infiltrated IV. LUE now swollen. + Pulses, and no motor deficits   - lower concern for compartment syndrome  - RUE Duplex without thrombus   - c/w derm recs regarding bilateral bullae on upper extremities (Likely edema bullae; please see Derm note  - Elevate R arm, warm compresses and arm precautions.    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 No manifest signs of bowel ischemia. Interval worsening of right lower lobe and right middle lobe atelectasis secondary to elevated right hemidiaphragm when compared to prior CT dated 7/11/2023. Superimposed infection in the collapsed lungs is not excluded  - s/p meropenem (2/21-2/24)  - Hold off additional doses of Vancomycin (MRSA swab negative)  - c/w home Valganciclovir for ppx renally dosed   - f/u infectious labs - Toxoplasma, EBV, Fungitell, Galactomannan, CMV, Cryptococcus, MRSA, ASCENCION virus, CMV, Crypto, ASCENCION virus  - f/u GI PCR, consider C. Diff testing   - Transplant ID recommendations   - Consider LP if MS not improving  - Warm blanket    ====================ENDOCRINE=======================  # Diabetes type 2.   - A1c 5.8  - ISS    ====================HEMATOLOGIC/DVT PPx=============  # Hx of hemolytic anemia  - hx of hemolytic anemia, follows with Dr. Rosario as outpatient  - c/w Prednisone 15 mg PO QD   - f/u hematology/oncology recs  - Monitor H&H daily.  - f/u hemolysis labs and peripheral smear    # DVT ppx  - restart heparin sq  - SCD    ====================ETHICS===========================  full code.    ======================= DISPOSITION  =====================  [X] Critical   [ ] Guarded    [ ] Stable    [X] Maintain in CICU  [ ] Downgrade to Telemetry  [ ] Discharge Home    Patient requires continuous monitoring with bedside rhythm monitoring, pulse ox monitoring, and intermittent blood gas analysis. Care plan discussed with ICU care team. Patient remained critical and at risk for life threatening decompensation.  Patient seen, examined and plan discussed with CCU team during rounds.     I have personally provided 35 minutes of critical care time excluding time spent on separate procedures, in addition to initial critical care time provided by the CICU Attending, Dr. Santiago

## 2025-02-26 NOTE — DIETITIAN INITIAL EVALUATION ADULT - PERTINENT MEDS FT
MEDICATIONS  (STANDING):  aspirin  chewable 81 milliGRAM(s) Oral daily  atorvastatin 10 milliGRAM(s) Oral at bedtime  atovaquone  Suspension 1500 milliGRAM(s) Oral daily  chlorhexidine 2% Cloths 1 Application(s) Topical daily  cholecalciferol 1000 Unit(s) Oral daily  CRRT Treatment    <Continuous>  dexMEDEtomidine Infusion 0.5 MICROgram(s)/kG/Hr (11.3 mL/Hr) IV Continuous <Continuous>  hydrocortisone sodium succinate Injectable 75 milliGRAM(s) IV Push every 8 hours  insulin lispro (ADMELOG) corrective regimen sliding scale   SubCutaneous every 6 hours  nystatin    Suspension 195155 Unit(s) Oral four times a day  pantoprazole  Injectable 40 milliGRAM(s) IV Push two times a day  Phoxillum Filtration BK 4 / 2.5 5000 milliLiter(s) (1000 mL/Hr) CRRT <Continuous>  Phoxillum Filtration BK 4 / 2.5 5000 milliLiter(s) (200 mL/Hr) CRRT <Continuous>  piperacillin/tazobactam IVPB.. 4.5 Gram(s) IV Intermittent every 8 hours  polyethylene glycol 3350 17 Gram(s) Oral daily  PrismaSATE Dialysate BGK 4 / 2.5 5000 milliLiter(s) (1200 mL/Hr) CRRT <Continuous>  senna 2 Tablet(s) Oral daily  tacrolimus    0.5 mG/mL Suspension 1 milliGRAM(s) Oral <User Schedule>  valGANciclovir 50 mG/mL Oral Solution 450 milliGRAM(s) Oral <User Schedule>  vasopressin Infusion 0.04 Unit(s)/Min (6 mL/Hr) IV Continuous <Continuous>

## 2025-02-26 NOTE — PROGRESS NOTE ADULT - ATTENDING COMMENTS
Transfer from MICU to CICU care - case discussed with MICU team  Heart transplant recipient (2018)  Hypervolemic  Now with NORMAN on CKD - initiated HD  Heart Failure follow-up appreciated

## 2025-02-26 NOTE — DIETITIAN INITIAL EVALUATION ADULT - PERTINENT LABORATORY DATA
02-26    136  |  102  |  23  ----------------------------<  110[H]  4.4   |  24  |  1.75[H]    Ca    8.0[L]      26 Feb 2025 11:30  Phos  3.0     02-26  Mg     2.7     02-26    TPro  5.3[L]  /  Alb  2.5[L]  /  TBili  1.2  /  DBili  x   /  AST  33  /  ALT  13  /  AlkPhos  46  02-26    POCT Blood Glucose.: 126 mg/dL (26 Feb 2025 11:13)  POCT Blood Glucose.: 137 mg/dL (26 Feb 2025 07:04)  POCT Blood Glucose.: 127 mg/dL (25 Feb 2025 17:07)  A1C with Estimated Average Glucose Result: 5.8 % (02-21-25 @ 09:38)  A1C with Estimated Average Glucose Result: 5.8 % (02-20-25 @ 05:35)

## 2025-02-26 NOTE — PROGRESS NOTE ADULT - SUBJECTIVE AND OBJECTIVE BOX
Follow Up:  Bacteremia    Interval History/ROS: afebrile, labs mostly unchanged. Received ativan for agitation overnight, somnolent/lethargic this AM    Allergies  No Known Allergies        ANTIMICROBIALS:  atovaquone  Suspension 1500 daily  nystatin    Suspension 602564 four times a day  piperacillin/tazobactam IVPB.. 4.5 every 8 hours  valGANciclovir 50 mG/mL Oral Solution 450 <User Schedule>      OTHER MEDS:  MEDICATIONS  (STANDING):  aspirin  chewable 81 daily  atorvastatin 10 at bedtime  dexMEDEtomidine Infusion 0.5 <Continuous>  hydrocortisone sodium succinate Injectable 75 every 8 hours  insulin lispro (ADMELOG) corrective regimen sliding scale  every 6 hours  pantoprazole  Injectable 40 two times a day  polyethylene glycol 3350 17 daily  senna 2 daily  tacrolimus    0.5 mG/mL Suspension 0.8 <User Schedule>  vasopressin Infusion 0.04 <Continuous>      Vital Signs Last 24 Hrs  T(C): 36.9 (26 Feb 2025 07:00), Max: 37.1 (25 Feb 2025 15:00)  T(F): 98.4 (26 Feb 2025 07:00), Max: 98.8 (25 Feb 2025 15:00)  HR: 102 (26 Feb 2025 10:00) (88 - 135)  BP: 127/63 (26 Feb 2025 10:00) (82/48 - 161/74)  BP(mean): 88 (26 Feb 2025 10:00) (57 - 106)  RR: 24 (26 Feb 2025 10:00) (12 - 38)  SpO2: 94% (26 Feb 2025 10:00) (79% - 100%)    Parameters below as of 26 Feb 2025 10:00  Patient On (Oxygen Delivery Method): room air        PHYSICAL EXAM:    General: Patient in NAD  HEENT: NCAT, NGT in place, Left neck CVC  CV: S1+S2, no m/r/g appreciated   Lungs: No respiratory distress, CTA anteriorly  Abd: Soft, nontender, no guarding, no rebound tenderness, + bowel sounds   : No suprapubic tenderness  Neuro: Somnolent, Moves all extremities against gravity.  Ext: No cyanosis, no edema, LUE arm blistering dressed   Skin: No rash, no phlebitis, surgical wounds clean                                  7.6    11.28 )-----------( 150      ( 26 Feb 2025 06:39 )             24.4       02-26    134[L]  |  103  |  24[H]  ----------------------------<  118[H]  4.9   |  22  |  1.86[H]    Ca    8.5      26 Feb 2025 06:39  Phos  3.0     02-26  Mg     2.6     02-26    TPro  5.1[L]  /  Alb  2.5[L]  /  TBili  1.1  /  DBili  x   /  AST  34  /  ALT  12  /  AlkPhos  46  02-26      Urinalysis Basic - ( 26 Feb 2025 06:39 )    Color: x / Appearance: x / SG: x / pH: x  Gluc: 118 mg/dL / Ketone: x  / Bili: x / Urobili: x   Blood: x / Protein: x / Nitrite: x   Leuk Esterase: x / RBC: x / WBC x   Sq Epi: x / Non Sq Epi: x / Bacteria: x        MICROBIOLOGY:  v    Culture - Blood (collected 24 Feb 2025 00:35)  Source: .Blood Blood-Peripheral  Preliminary Report (26 Feb 2025 03:01):    No growth at 48 Hours    Culture - Blood (collected 24 Feb 2025 00:20)  Source: .Blood Blood-Peripheral  Preliminary Report (26 Feb 2025 03:01):    No growth at 48 Hours    Culture - Blood (collected 22 Feb 2025 03:11)  Source: .Blood Blood-Peripheral  Preliminary Report (26 Feb 2025 09:01):    No growth at 4 days            Toxoplasma IgG Screen: 10.70 IU/mL (02-21-25 @ 09:38)    CMVPCR Log: NotDetec [id number]/mL (02-22 @ 07:33)  Rapid RVP Result: NotDetec (02-19 @ 17:28)        RADIOLOGY:       Follow Up:  Bacteremia    Interval History/ROS: afebrile, labs mostly unchanged. Received ativan for agitation overnight, somnolent/lethargic this AM    Allergies  No Known Allergies        ANTIMICROBIALS:  atovaquone  Suspension 1500 daily  nystatin    Suspension 998121 four times a day  piperacillin/tazobactam IVPB.. 4.5 every 8 hours  valGANciclovir 50 mG/mL Oral Solution 450 <User Schedule>      OTHER MEDS:  MEDICATIONS  (STANDING):  aspirin  chewable 81 daily  atorvastatin 10 at bedtime  dexMEDEtomidine Infusion 0.5 <Continuous>  hydrocortisone sodium succinate Injectable 75 every 8 hours  insulin lispro (ADMELOG) corrective regimen sliding scale  every 6 hours  pantoprazole  Injectable 40 two times a day  polyethylene glycol 3350 17 daily  senna 2 daily  tacrolimus    0.5 mG/mL Suspension 0.8 <User Schedule>  vasopressin Infusion 0.04 <Continuous>      Vital Signs Last 24 Hrs  T(C): 36.9 (26 Feb 2025 07:00), Max: 37.1 (25 Feb 2025 15:00)  T(F): 98.4 (26 Feb 2025 07:00), Max: 98.8 (25 Feb 2025 15:00)  HR: 102 (26 Feb 2025 10:00) (88 - 135)  BP: 127/63 (26 Feb 2025 10:00) (82/48 - 161/74)  BP(mean): 88 (26 Feb 2025 10:00) (57 - 106)  RR: 24 (26 Feb 2025 10:00) (12 - 38)  SpO2: 94% (26 Feb 2025 10:00) (79% - 100%)    Parameters below as of 26 Feb 2025 10:00  Patient On (Oxygen Delivery Method): room air        PHYSICAL EXAM:    General: Patient in NAD  HEENT: NCAT, NGT in place, Left neck CVC  CV: S1+S2, no m/r/g appreciated   Lungs: No respiratory distress, CTA anteriorly  Abd: Soft, nontender, no guarding, no rebound tenderness, + bowel sounds   : No suprapubic tenderness  Neuro: Somnolent, Moves all extremities against gravity.  Ext: No cyanosis, no edema, LUE arm blistering dressed   Skin: erythematous papule at the right sternoclavicular border - no drainage or tenderness                                  7.6    11.28 )-----------( 150      ( 26 Feb 2025 06:39 )             24.4       02-26    134[L]  |  103  |  24[H]  ----------------------------<  118[H]  4.9   |  22  |  1.86[H]    Ca    8.5      26 Feb 2025 06:39  Phos  3.0     02-26  Mg     2.6     02-26    TPro  5.1[L]  /  Alb  2.5[L]  /  TBili  1.1  /  DBili  x   /  AST  34  /  ALT  12  /  AlkPhos  46  02-26      Urinalysis Basic - ( 26 Feb 2025 06:39 )    Color: x / Appearance: x / SG: x / pH: x  Gluc: 118 mg/dL / Ketone: x  / Bili: x / Urobili: x   Blood: x / Protein: x / Nitrite: x   Leuk Esterase: x / RBC: x / WBC x   Sq Epi: x / Non Sq Epi: x / Bacteria: x        MICROBIOLOGY:  v    Culture - Blood (collected 24 Feb 2025 00:35)  Source: .Blood Blood-Peripheral  Preliminary Report (26 Feb 2025 03:01):    No growth at 48 Hours    Culture - Blood (collected 24 Feb 2025 00:20)  Source: .Blood Blood-Peripheral  Preliminary Report (26 Feb 2025 03:01):    No growth at 48 Hours    Culture - Blood (collected 22 Feb 2025 03:11)  Source: .Blood Blood-Peripheral  Preliminary Report (26 Feb 2025 09:01):    No growth at 4 days            Toxoplasma IgG Screen: 10.70 IU/mL (02-21-25 @ 09:38)    CMVPCR Log: NotDetec [id number]/mL (02-22 @ 07:33)  Rapid RVP Result: NotDetec (02-19 @ 17:28)        RADIOLOGY:    <The imaging below has been reviewed and visualized by me independently. Findings as detailed in report below>    < from: CT Abdomen and Pelvis No Cont (02.26.25 @ 13:22) >  IMPRESSION:  Bronchial mucus plugging with right lower lobe atelectasis.    No CT evidence of occult abscessin the abdomen or pelvis.    < end of copied text >

## 2025-02-26 NOTE — PROGRESS NOTE ADULT - ASSESSMENT
72yo M pmhx HTN, HLD, nonischemic cardiomyopathy, chronic systolic heart failure s/p HM2 LVAD (6/2017), s/p heart transplant from Hep. 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 Pt here with change in MS, hyperkalemia and norman.   Patient was given Calcium Gluconate 2g, D50/Insulin 5 unit, 40 mg IV lasix, Lokelma 10mg.   Vancomycin and Zosyn       Hyperkalemia:  Pt with K level of 6.7 that improved to 6.1 after medical management with additional dose of insulin given.   K 5.6 initially   On CRRT now. Serum K is better now.   Monitor labs.     Stage 3 chronic kidney disease: with superimposed NORMAN:  Patient with known history of CKD. Upon HIE review, Scr ranged from 1.3-2.1 in 2023. Most recent Scr was 1.75 on 1/30/24.   Scr is worsening now.  2.49 --->2.75--> 4.34--> 4.65---> 6.64 -->6.68 ---> 6.89.  pt was started on CRRT 2/23  CPK is down trending. - Pt denies any seizure before coming to hospital. (Though not reliable history) - EEG ruled out seizures.   NORMAN likely in setting of ? underlying infection related and hemodynamic mediated ATN. Phos level was high. uric acid low, less likely TLS but uric acid was checked post CRRT  Transplant ID reccs appreciated.   Pt has respiratory acidosis as well along with metabolic acidosis.   neurology is on board.   UOP is ~40cc in last 24 hours.     PLAN:  C/w CRRT. bags adjusted as per latest labs. Pt is on vasopressin gtt.   Phos binders work with PO diet only. No need for now.  Phos level is 6.2 ( improving with CRRT)---> 2.4--> 3.0  Resp acidosis management as per primary team.   Monitor labs, daily wts and I/Os.   Check tacro trough daily. - 7.4---> 2.4 - Immuno suppression as per primary team.   on empirical Abx. Infectious work up as per primary team and ID.   Dose medications as per eGFR for now.       Called pt's care taker - Tahira Quintero - explained his condition from nephrology stand point. She verbalized her understanding and consented for hemodialysis/ CRRT, if needed.   CRRT was initiated on 2/23.     Discussed with primary team.

## 2025-02-26 NOTE — PROGRESS NOTE ADULT - SUBJECTIVE AND OBJECTIVE BOX
PATIENT: BILLY RUSSELL, MRN: [id number]    CHIEF COMPLAINT: Patient is a 74y old  Male who presents with a chief complaint of Lethargy (25 Feb 2025 23:52)      INTERVAL HISTORY/OVERNIGHT EVENTS: No overnight events. Denies abdominal pain, chest pain or SOB, cough. Has been ambulating with assistance. Oriented to person, place, and time. Breathing comfortably on room air.    REVIEW OF SYSTEMS:    Constitutional:     [ ] negative [ ] fevers [ ] chills [ ] weight loss [ ] weight gain  HEENT:                  [ ] negative [ ] dry eyes [ ] eye irritation [ ] postnasal drip [ ] nasal congestion  CV:                         [ ] negative  [ ] chest pain [ ] orthopnea [ ] palpitations [ ] murmur  Resp:                     [ ] negative [ ] cough [ ] shortness of breath [ ] dyspnea [ ] wheezing [ ] sputum [ ] hemoptysis  GI:                          [ ] negative [ ] nausea [ ] vomiting [ ] diarrhea [ ] constipation [ ] abd pain [ ] dysphagia   :                        [ ] negative [ ] dysuria [ ] nocturia [ ] hematuria [ ] increased urinary frequency  Musculoskeletal: [ ] negative [ ] back pain [ ] myalgias [ ] arthralgias [ ] fracture  Skin:                       [ ] negative [ ] rash [ ] itch  Neurological:        [ ] negative [ ] headache [ ] dizziness [ ] syncope [ ] weakness [ ] numbness  Psychiatric:           [ ] negative [ ] anxiety [ ] depression  Endocrine:            [ ] negative [ ] diabetes [ ] thyroid problem  Heme/Lymph:      [ ] negative [ ] anemia [ ] bleeding problem  Allergic/Immune: [ ] negative [ ] itchy eyes [ ] nasal discharge [ ] hives [ ] angioedema    [ ] All other systems negative  [ ] Unable to assess ROS because ________.    MEDICATIONS:  MEDICATIONS  (STANDING):  aspirin  chewable 81 milliGRAM(s) Oral daily  atorvastatin 10 milliGRAM(s) Oral at bedtime  atovaquone  Suspension 1500 milliGRAM(s) Oral daily  chlorhexidine 2% Cloths 1 Application(s) Topical daily  cholecalciferol 1000 Unit(s) Oral daily  CRRT Treatment    <Continuous>  dexMEDEtomidine Infusion 0.5 MICROgram(s)/kG/Hr (11.3 mL/Hr) IV Continuous <Continuous>  hydrocortisone sodium succinate Injectable 75 milliGRAM(s) IV Push every 8 hours  insulin lispro (ADMELOG) corrective regimen sliding scale   SubCutaneous every 6 hours  nystatin    Suspension 267351 Unit(s) Oral four times a day  pantoprazole  Injectable 40 milliGRAM(s) IV Push two times a day  Phoxillum Filtration BK 4 / 2.5 5000 milliLiter(s) (1000 mL/Hr) CRRT <Continuous>  Phoxillum Filtration BK 4 / 2.5 5000 milliLiter(s) (200 mL/Hr) CRRT <Continuous>  piperacillin/tazobactam IVPB.. 4.5 Gram(s) IV Intermittent every 8 hours  polyethylene glycol 3350 17 Gram(s) Oral daily  PrismaSATE Dialysate BGK 4 / 2.5 5000 milliLiter(s) (1200 mL/Hr) CRRT <Continuous>  senna 2 Tablet(s) Oral daily  tacrolimus    0.5 mG/mL Suspension 0.8 milliGRAM(s) Oral <User Schedule>  valGANciclovir 50 mG/mL Oral Solution 450 milliGRAM(s) Oral <User Schedule>  vasopressin Infusion 0.04 Unit(s)/Min (6 mL/Hr) IV Continuous <Continuous>    MEDICATIONS  (PRN):      ALLERGIES: Allergies    No Known Allergies    Intolerances        OBJECTIVE:  ICU Vital Signs Last 24 Hrs  T(C): 36.9 (26 Feb 2025 07:00), Max: 37.1 (25 Feb 2025 15:00)  T(F): 98.4 (26 Feb 2025 07:00), Max: 98.8 (25 Feb 2025 15:00)  HR: 93 (26 Feb 2025 07:00) (93 - 135)  BP: 120/54 (26 Feb 2025 07:00) (82/48 - 161/74)  BP(mean): 78 (26 Feb 2025 07:00) (57 - 106)  ABP: --  ABP(mean): --  RR: 14 (26 Feb 2025 07:00) (12 - 38)  SpO2: 94% (26 Feb 2025 07:00) (79% - 100%)    O2 Parameters below as of 26 Feb 2025 07:00  Patient On (Oxygen Delivery Method): room air            Adult Advanced Hemodynamics Last 24 Hrs  CVP(mm Hg): --  CVP(cm H2O): --  CO: --  CI: --  PA: --  PA(mean): --  PCWP: --  SVR: --  SVRI: --  PVR: --  PVRI: --  CAPILLARY BLOOD GLUCOSE      POCT Blood Glucose.: 137 mg/dL (26 Feb 2025 07:04)  POCT Blood Glucose.: 127 mg/dL (25 Feb 2025 17:07)  POCT Blood Glucose.: 111 mg/dL (25 Feb 2025 11:34)    CAPILLARY BLOOD GLUCOSE      POCT Blood Glucose.: 137 mg/dL (26 Feb 2025 07:04)    I&O's Summary    25 Feb 2025 07:01  -  26 Feb 2025 07:00  --------------------------------------------------------  IN: 291.9 mL / OUT: 318 mL / NET: -26.1 mL      Daily     Daily     PHYSICAL EXAMINATION:  General: Comfortable, no acute distress, cooperative with exam.  HEENT: Moist mucous membranes.  Respiratory: CTAB, normal respiratory effort, no coughing, wheezes, crackles, or rales.  CV: RRR, S1S2, no murmurs, rubs or gallops. No JVD. Distal pulses intact.  Abdominal: Soft, nontender, nondistended, no rebound or guarding, normal bowel sounds.  Neurology: AOx3, no focal neuro defects, RUVALCABA x 4.  Extremities: No pitting edema, + Peripheral pulses.  Cath site:   Tubes:    LABS:                          7.6    11.28 )-----------( 150      ( 26 Feb 2025 06:39 )             24.4     02-26    134[L]  |  103  |  24[H]  ----------------------------<  118[H]  4.9   |  22  |  1.86[H]    Ca    8.5      26 Feb 2025 06:39  Phos  3.0     02-26  Mg     2.6     02-26    TPro  5.1[L]  /  Alb  2.5[L]  /  TBili  1.1  /  DBili  x   /  AST  34  /  ALT  12  /  AlkPhos  46  02-26    LIVER FUNCTIONS - ( 26 Feb 2025 06:39 )  Alb: 2.5 g/dL / Pro: 5.1 g/dL / ALK PHOS: 46 U/L / ALT: 12 U/L / AST: 34 U/L / GGT: x           PT/INR - ( 25 Feb 2025 00:22 )   PT: 11.1 sec;   INR: 0.97 ratio         PTT - ( 25 Feb 2025 00:22 )  PTT:29.7 sec        Urinalysis Basic - ( 26 Feb 2025 06:39 )    Color: x / Appearance: x / SG: x / pH: x  Gluc: 118 mg/dL / Ketone: x  / Bili: x / Urobili: x   Blood: x / Protein: x / Nitrite: x   Leuk Esterase: x / RBC: x / WBC x   Sq Epi: x / Non Sq Epi: x / Bacteria: x        TELEMETRY:     EKG:     IMAGING:  PATIENT: BILLY RUSSELL, MRN: [id number]    CHIEF COMPLAINT: Patient is a 74y old  Male who presents with a chief complaint of Lethargy (25 Feb 2025 23:52)      INTERVAL HISTORY/OVERNIGHT EVENTS: overnight pt agitated given fent and ativan    REVIEW OF SYSTEMS:    Constitutional:     [ ] negative [ ] fevers [ ] chills [ ] weight loss [ ] weight gain  HEENT:                  [ ] negative [ ] dry eyes [ ] eye irritation [ ] postnasal drip [ ] nasal congestion  CV:                         [ ] negative  [ ] chest pain [ ] orthopnea [ ] palpitations [ ] murmur  Resp:                     [ ] negative [ ] cough [ ] shortness of breath [ ] dyspnea [ ] wheezing [ ] sputum [ ] hemoptysis  GI:                          [ ] negative [ ] nausea [ ] vomiting [ ] diarrhea [ ] constipation [ ] abd pain [ ] dysphagia   :                        [ ] negative [ ] dysuria [ ] nocturia [ ] hematuria [ ] increased urinary frequency  Musculoskeletal: [ ] negative [ ] back pain [ ] myalgias [ ] arthralgias [ ] fracture  Skin:                       [ ] negative [ ] rash [ ] itch  Neurological:        [ ] negative [ ] headache [ ] dizziness [ ] syncope [ ] weakness [ ] numbness  Psychiatric:           [ ] negative [ ] anxiety [ ] depression  Endocrine:            [ ] negative [ ] diabetes [ ] thyroid problem  Heme/Lymph:      [ ] negative [ ] anemia [ ] bleeding problem  Allergic/Immune: [ ] negative [ ] itchy eyes [ ] nasal discharge [ ] hives [ ] angioedema    [ ] All other systems negative  [ ] Unable to assess ROS because ________.    MEDICATIONS:  MEDICATIONS  (STANDING):  aspirin  chewable 81 milliGRAM(s) Oral daily  atorvastatin 10 milliGRAM(s) Oral at bedtime  atovaquone  Suspension 1500 milliGRAM(s) Oral daily  chlorhexidine 2% Cloths 1 Application(s) Topical daily  cholecalciferol 1000 Unit(s) Oral daily  CRRT Treatment    <Continuous>  dexMEDEtomidine Infusion 0.5 MICROgram(s)/kG/Hr (11.3 mL/Hr) IV Continuous <Continuous>  hydrocortisone sodium succinate Injectable 75 milliGRAM(s) IV Push every 8 hours  insulin lispro (ADMELOG) corrective regimen sliding scale   SubCutaneous every 6 hours  nystatin    Suspension 393198 Unit(s) Oral four times a day  pantoprazole  Injectable 40 milliGRAM(s) IV Push two times a day  Phoxillum Filtration BK 4 / 2.5 5000 milliLiter(s) (1000 mL/Hr) CRRT <Continuous>  Phoxillum Filtration BK 4 / 2.5 5000 milliLiter(s) (200 mL/Hr) CRRT <Continuous>  piperacillin/tazobactam IVPB.. 4.5 Gram(s) IV Intermittent every 8 hours  polyethylene glycol 3350 17 Gram(s) Oral daily  PrismaSATE Dialysate BGK 4 / 2.5 5000 milliLiter(s) (1200 mL/Hr) CRRT <Continuous>  senna 2 Tablet(s) Oral daily  tacrolimus    0.5 mG/mL Suspension 0.8 milliGRAM(s) Oral <User Schedule>  valGANciclovir 50 mG/mL Oral Solution 450 milliGRAM(s) Oral <User Schedule>  vasopressin Infusion 0.04 Unit(s)/Min (6 mL/Hr) IV Continuous <Continuous>    MEDICATIONS  (PRN):      ALLERGIES: Allergies    No Known Allergies    Intolerances        OBJECTIVE:  ICU Vital Signs Last 24 Hrs  T(C): 36.9 (26 Feb 2025 07:00), Max: 37.1 (25 Feb 2025 15:00)  T(F): 98.4 (26 Feb 2025 07:00), Max: 98.8 (25 Feb 2025 15:00)  HR: 93 (26 Feb 2025 07:00) (93 - 135)  BP: 120/54 (26 Feb 2025 07:00) (82/48 - 161/74)  BP(mean): 78 (26 Feb 2025 07:00) (57 - 106)  ABP: --  ABP(mean): --  RR: 14 (26 Feb 2025 07:00) (12 - 38)  SpO2: 94% (26 Feb 2025 07:00) (79% - 100%)    O2 Parameters below as of 26 Feb 2025 07:00  Patient On (Oxygen Delivery Method): room air            Adult Advanced Hemodynamics Last 24 Hrs  CVP(mm Hg): --  CVP(cm H2O): --  CO: --  CI: --  PA: --  PA(mean): --  PCWP: --  SVR: --  SVRI: --  PVR: --  PVRI: --  CAPILLARY BLOOD GLUCOSE      POCT Blood Glucose.: 137 mg/dL (26 Feb 2025 07:04)  POCT Blood Glucose.: 127 mg/dL (25 Feb 2025 17:07)  POCT Blood Glucose.: 111 mg/dL (25 Feb 2025 11:34)    CAPILLARY BLOOD GLUCOSE      POCT Blood Glucose.: 137 mg/dL (26 Feb 2025 07:04)    I&O's Summary    25 Feb 2025 07:01  -  26 Feb 2025 07:00  --------------------------------------------------------  IN: 291.9 mL / OUT: 318 mL / NET: -26.1 mL      Daily     Daily     PHYSICAL EXAMINATION:  General: Comfortable, no acute distress, cooperative with exam.  HEENT: Moist mucous membranes.  Respiratory: CTAB, normal respiratory effort, no coughing, wheezes, crackles, or rales.  CV: RRR, S1S2, no murmurs, rubs or gallops. No JVD. Distal pulses intact.  Abdominal: Soft, nontender, nondistended, no rebound or guarding, normal bowel sounds.  Neurology: AOx0  Extremities: No pitting edema, + Peripheral pulses.  Cath site:   Tubes:    LABS:                          7.6    11.28 )-----------( 150      ( 26 Feb 2025 06:39 )             24.4     02-26    134[L]  |  103  |  24[H]  ----------------------------<  118[H]  4.9   |  22  |  1.86[H]    Ca    8.5      26 Feb 2025 06:39  Phos  3.0     02-26  Mg     2.6     02-26    TPro  5.1[L]  /  Alb  2.5[L]  /  TBili  1.1  /  DBili  x   /  AST  34  /  ALT  12  /  AlkPhos  46  02-26    LIVER FUNCTIONS - ( 26 Feb 2025 06:39 )  Alb: 2.5 g/dL / Pro: 5.1 g/dL / ALK PHOS: 46 U/L / ALT: 12 U/L / AST: 34 U/L / GGT: x           PT/INR - ( 25 Feb 2025 00:22 )   PT: 11.1 sec;   INR: 0.97 ratio         PTT - ( 25 Feb 2025 00:22 )  PTT:29.7 sec        Urinalysis Basic - ( 26 Feb 2025 06:39 )    Color: x / Appearance: x / SG: x / pH: x  Gluc: 118 mg/dL / Ketone: x  / Bili: x / Urobili: x   Blood: x / Protein: x / Nitrite: x   Leuk Esterase: x / RBC: x / WBC x   Sq Epi: x / Non Sq Epi: x / Bacteria: x        TELEMETRY:     EKG:     IMAGING:

## 2025-02-26 NOTE — PROGRESS NOTE ADULT - ATTENDING COMMENTS
Patient seen and examined at bedside.     I agree with the findings and plan as documented in the Resident's note except below.    Mr. 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 Neurology reconsulted for persistent altered mental status. Etiology of altered mental status is uncertain but perhaps due to the metabolic encephalopathy s/p sedative medication versus encephalitis given history of immunocompromised versus vascular etiology.  Patient will benefit from further work up to rule out treatable etiologies. Further workup and management will be guided by pending results.    MRI brain  Continue medical management, neuro- check and fall precaution.  GI and DVT prophylaxis.  PT and OT once patient is stable.     Patient is at high risk for complications and morbidity or mortality. There is high probability of imminent or life threatening deterioration in the patient's condition.  Patient is unable or incompetent to participate in giving a history and/or making decisions and discussion is necessary for determining treatment decisions.    My cumulative time taking care of this critically ill patient is 60 minutes  If you have any further questions, please do not hesitate to contact our team.  Thank you for allowing us to participate in this patient care.

## 2025-02-26 NOTE — DIETITIAN INITIAL EVALUATION ADULT - ADD RECOMMEND
-If enteral route unable to be utilized for nutrition, place consult to TPN team to determine if patient is appropriate for alternate means of nutrition.  -Monitor for malnutrition given Inadequate energy intake.

## 2025-02-26 NOTE — DIETITIAN INITIAL EVALUATION ADULT - ORAL INTAKE PTA/DIET HISTORY
Patient A&Ox1 per RN, inappropriate to interview at time of visit. Unclear if patient had changes in appetite/intake PTA, followed therapeutic diet PTA, took vitamins/supplements PTA. No known food allergies per chart.

## 2025-02-26 NOTE — DIETITIAN INITIAL EVALUATION ADULT - PROBLEM SELECTOR PLAN 2
T 91.4F, WBC 21 concerning for possible occult infection, unclear source,   - U/A without LE or Nitrites, CXR without clear consolidation  > Lower concern for meningitis at this moment  > f/u Bcx and Ucx, MRSA, CMV, Crypto, ASCENCION virus  > c/w Zosyn empirically 2/20 - , Vanc by level   > c/w home Valganciclovir for ppx   > CTAP with IV contrast - w/o source of infection   > Transplant ID recommendations   > LP pending Monday 2/24

## 2025-02-26 NOTE — DIETITIAN INITIAL EVALUATION ADULT - PROBLEM SELECTOR PLAN 1
Patient baseline AOx3, AOx1 currently, likely iso of infection vs metabolic derangements (uremic encephelopathy)   -  CTH and angio w/o hemorrhage or stenosis   > Infectious work up as below   - TSH wnl, CK 2200,  f/u B12  - Fall precautions and dysphagia screen  > Spot EEG to r/o seizure due to elevated CK and hx of seizures  > Neurology consult

## 2025-02-26 NOTE — DIETITIAN INITIAL EVALUATION ADULT - PROBLEM SELECTOR PLAN 3
K 6.2 - shifted in the ED, no EKG changes consistent with severe hyper K. Cr not significantly elevated form baseline, c/f RTA from bactrim and Tacro   - s/p Lasix 40 IV   > Hold home ARB  > f/u Tacro level daily   - Hold home bactrim - ID consult regarding Atovaquone   > c/w Lokelma 10mg BID for 3 days

## 2025-02-26 NOTE — PROGRESS NOTE ADULT - ASSESSMENT
74yo M pmhx HTN, HLD, CKD, nonischemic cardiomyopathy, chronic systolic heart failure s/p HM2 LVAD (6/2017), s/p heart transplant from Hep. 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 Superimposed infection in the collapsed lungs is not excluded.    CT abdomen pelvis (February 22) Since prior study 2/22/2025 interval development of diffusely dilated small bowel loops with air-fluid levels. Contrast from prior study has passed into the colon. Findings are favored to represent ileus or less likely partial small bowel obstruction rather than mechanical obstruction. No manifest signs of bowel ischemia.    At this point suspect that Pseudomonas bacteremia was secondary to either a respiratory source or gastrointestinal translocation.  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    Antimicrobials   Zosyn 2/19/ 2/20  Meropenem 2/20-2/24  Zosyn 2/25-    #Pseudomonas Bacteremia, Encephalopathy, Heart Transplant Recipient, Prophylactic Antibiotic  # CKD with NORMAN on CRRT    --Continue Zosyn 3.375g IV Q8H  --Continue Atovaquone 1,500 mg PO Q24H for PCP PPx  -- f/u CT C/A/P  --If persisting encephalopathy will need to consider LP  --Continue Valcyte 450 mg M/W/F for CMV PPx  --Continue to follow CBC with diff  --Continue to follow transaminases  --Continue to follow temperature curve  --Follow up on preliminary blood cultures          All recommendations are tentative pending Attending Attestation.    Ignacio Sepulveda MD, PGY-5  ID Fellow  [employer] Teams Preferred  After 5pm/weekends call [phone]   72yo M pmhx HTN, HLD, CKD, nonischemic cardiomyopathy, chronic systolic heart failure s/p HM2 LVAD (6/2017), s/p heart transplant from Hep. 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 Superimposed infection in the collapsed lungs is not excluded.    CT abdomen pelvis (February 22) Since prior study 2/22/2025 interval development of diffusely dilated small bowel loops with air-fluid levels. Contrast from prior study has passed into the colon. Findings are favored to represent ileus or less likely partial small bowel obstruction rather than mechanical obstruction. No manifest signs of bowel ischemia.    At this point suspect that Pseudomonas bacteremia was secondary to either a respiratory source or gastrointestinal translocation.  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    Antimicrobials   Zosyn 2/19/ 2/20  Meropenem 2/20-2/24  Zosyn 2/25-    #Pseudomonas Bacteremia, Encephalopathy, Heart Transplant Recipient, Prophylactic Antibiotic  # CKD with NORMAN on CRRT    --Continue Zosyn 3.375g IV Q8H  --Continue Atovaquone 1,500 mg PO Q24H for PCP PPx  -- f/u CT C/A/P  -- Encephalopathy associated with uremia  --Continue Valcyte 450 mg M/W/F for CMV PPx  --Continue to follow CBC with diff  --Continue to follow transaminases  --Continue to follow temperature curve  --Follow up on preliminary blood cultures      Discussed with attending    Ignacio Sepulveda MD, PGY-5  ID Fellow  [employer] Teams Preferred  After 5pm/weekends call [phone]   72yo M pmhx HTN, HLD, CKD, nonischemic cardiomyopathy, chronic systolic heart failure s/p HM2 LVAD (6/2017), s/p heart transplant from Hep. 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 Superimposed infection in the collapsed lungs is not excluded.    CT abdomen pelvis (February 22) Since prior study 2/22/2025 interval development of diffusely dilated small bowel loops with air-fluid levels. Contrast from prior study has passed into the colon. Findings are favored to represent ileus or less likely partial small bowel obstruction rather than mechanical obstruction. No manifest signs of bowel ischemia.    At this point suspect that Pseudomonas bacteremia was secondary to either a respiratory source or gastrointestinal translocation.  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    Antimicrobials   Zosyn 2/19/ 2/20  Meropenem 2/20-2/24  Zosyn 2/25-    #Pseudomonas Bacteremia, Encephalopathy, Heart Transplant Recipient, Prophylactic Antibiotic  # CKD with NORMAN on CRRT  --Continue Zosyn 3.375g IV Q8H  --Continue Atovaquone 1,500 mg PO Q24H for PCP PPx  -- f/u CT C/A/P - if continued erythema / drainage from sternal papule would image with MRI to evaluate underlying sternoclavicular joint.   -- Encephalopathy associated with uremia  --Continue Valcyte 450 mg M/W/F for CMV PPx  --Continue to follow CBC with diff  --Continue to follow transaminases  --Continue to follow temperature curve  --Follow up on preliminary blood cultures    Discussed with attending    Ignacio Sepulveda MD, PGY-5  ID Fellow  [employer] Teams Preferred  After 5pm/weekends call [phone]

## 2025-02-26 NOTE — PROGRESS NOTE ADULT - SUBJECTIVE AND OBJECTIVE BOX
[facility] Division of Kidney Diseases & Hypertension  FOLLOW UP NOTE  [phone]--------------------------------------------------------------------------------  Chief Complaint:Hyperkalemia, NORMAN on CKD. on CRRT now.         24 hour events/subjective:  Pt was seen and examined earlier today. No acute overnight events. Pt is in AMS. Not responding to verbal or tactile stimuli.   ROS is limited due to mental status.         PAST HISTORY  --------------------------------------------------------------------------------  No significant changes to PMH, PSH, FHx, SHx, unless otherwise noted    ALLERGIES & MEDICATIONS  --------------------------------------------------------------------------------  Allergies    No Known Allergies    Intolerances      Standing Inpatient Medications  aspirin  chewable 81 milliGRAM(s) Oral daily  atorvastatin 10 milliGRAM(s) Oral at bedtime  atovaquone  Suspension 1500 milliGRAM(s) Oral daily  chlorhexidine 2% Cloths 1 Application(s) Topical daily  cholecalciferol 1000 Unit(s) Oral daily  CRRT Treatment    <Continuous>  dexMEDEtomidine Infusion 0.5 MICROgram(s)/kG/Hr IV Continuous <Continuous>  hydrocortisone sodium succinate Injectable 75 milliGRAM(s) IV Push every 8 hours  insulin lispro (ADMELOG) corrective regimen sliding scale   SubCutaneous every 6 hours  nystatin    Suspension 531609 Unit(s) Oral four times a day  pantoprazole  Injectable 40 milliGRAM(s) IV Push two times a day  Phoxillum Filtration BK 4 / 2.5 5000 milliLiter(s) CRRT <Continuous>  Phoxillum Filtration BK 4 / 2.5 5000 milliLiter(s) CRRT <Continuous>  piperacillin/tazobactam IVPB.. 4.5 Gram(s) IV Intermittent every 8 hours  polyethylene glycol 3350 17 Gram(s) Oral daily  PrismaSATE Dialysate BGK 4 / 2.5 5000 milliLiter(s) CRRT <Continuous>  senna 2 Tablet(s) Oral daily  tacrolimus    0.5 mG/mL Suspension 0.8 milliGRAM(s) Oral <User Schedule>  valGANciclovir 50 mG/mL Oral Solution 450 milliGRAM(s) Oral <User Schedule>  vasopressin Infusion 0.04 Unit(s)/Min IV Continuous <Continuous>    PRN Inpatient Medications      REVIEW OF SYSTEMS  --------------------------------------------------------------------------------  ROS is limited due to mental status.     VITALS/PHYSICAL EXAM  --------------------------------------------------------------------------------  T(C): 36.9 (02-26-25 @ 07:00), Max: 37.1 (02-25-25 @ 15:00)  HR: 102 (02-26-25 @ 10:00) (88 - 135)  BP: 127/63 (02-26-25 @ 10:00) (82/48 - 161/74)  RR: 24 (02-26-25 @ 10:00) (12 - 38)  SpO2: 94% (02-26-25 @ 10:00) (79% - 100%)  Wt(kg): --        02-25-25 @ 07:01  -  02-26-25 @ 07:00  --------------------------------------------------------  IN: 316.9 mL / OUT: 318 mL / NET: -1.1 mL    02-26-25 @ 07:01  -  02-26-25 @ 11:19  --------------------------------------------------------  IN: 195.8 mL / OUT: 351 mL / NET: -155.2 mL      Physical Exam:  Gen: NAD  Pulm: CTA B/L  CV: S1S2  Abd: Soft, +BS   Ext: No LE edema B/L  Neuro: AAO x1-2 - Interval improvement in mental status.   Skin: Warm and dry  Lfemoral non- tunnelled HD cath in place - being used for CRRT.       LABS/STUDIES  --------------------------------------------------------------------------------              7.6    11.28 >-----------<  150      [02-26-25 @ 06:39]              24.4     134  |  103  |  24  ----------------------------<  118      [02-26-25 @ 06:39]  4.9   |  22  |  1.86        Ca     8.5     [02-26-25 @ 06:39]      Mg     2.6     [02-26-25 @ 06:39]      Phos  3.0     [02-26-25 @ 06:39]    TPro  5.1  /  Alb  2.5  /  TBili  1.1  /  DBili  x   /  AST  34  /  ALT  12  /  AlkPhos  46  [02-26-25 @ 06:39]    PT/INR: PT 11.1 , INR 0.97       [02-25-25 @ 00:22]  PTT: 29.7       [02-25-25 @ 00:22]    Uric acid 2.0      [02-24-25 @ 17:31]        [02-25-25 @ 11:56]    Creatinine Trend:  SCr 1.86 [02-26 @ 06:39]  SCr 1.94 [02-26 @ 00:51]  SCr 2.06 [02-25 @ 18:45]  SCr 2.20 [02-25 @ 12:20]  SCr 2.66 [02-25 @ 06:23]      TSH 2.57      [02-19-25 @ 17:28]  Lipid: chol 95, TG 65, HDL 55, LDL --      [02-20-25 @ 05:35]      Syphilis Screen (Treponema Pallidum Ab) Negative      [02-21-25 @ 09:38]

## 2025-02-26 NOTE — PROGRESS NOTE ADULT - SUBJECTIVE AND OBJECTIVE BOX
BILLY RUSSELL  MRN-[id number]  Patient is a 74y old  Male who presents with a chief complaint of Hyperkalemia     (26 Feb 2025 13:35)    HPI:  75yo M pmhx HTN, HLD, nonischemic cardiomyopathy, chronic systolic heart failure s/p HM2 LVAD (6/2017), s/p heart transplant from Hep. 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 On the phone, the patient's godbrother mentioned that on 2/18, the patient was in the car with his godbrother and was disoriented asking to get off on the road 4 blocks before his house. In addition, a caretaker stated that when she spoke to Mr. Hameed on the phone at 11am on 2/18, he was doing well. However, when the caretaker visited 2 hours later at 1pm, the patient was disoriented and felt his legs were heavy. This prompted the patient to come to the hospital.     In the ED: Hypothermic 91.4, HR 80, /60, RA   Patient noted to be Hyperkalemic (6.2) with Mixed respiratory and metabolic acidosis pH 7.2. - Patient was given Calcium Gluconate 2g, D50/Insulin 5 unit, 40 mg IV lasix, Lokelma 10mg.   Vancomycin and Zosyn  (20 Feb 2025 07:21)    INTERVAL HPI/OVERNIGHT EVENTS: No acute events overnight.    REVIEW OF SYSTEMS: Limited 2/2 mental status, Denies pain SOB    MEDICATIONS  (STANDING):  aspirin  chewable 81 milliGRAM(s) Oral daily  atorvastatin 10 milliGRAM(s) Oral at bedtime  atovaquone  Suspension 1500 milliGRAM(s) Oral daily  chlorhexidine 2% Cloths 1 Application(s) Topical daily  cholecalciferol 1000 Unit(s) Oral daily  CRRT Treatment    <Continuous>  dexMEDEtomidine Infusion 0.5 MICROgram(s)/kG/Hr (11.3 mL/Hr) IV Continuous <Continuous>  fentaNYL    Injectable 25 MICROGram(s) IV Push once  hydrocortisone sodium succinate Injectable 75 milliGRAM(s) IV Push every 8 hours  insulin lispro (ADMELOG) corrective regimen sliding scale   SubCutaneous every 6 hours  nystatin    Suspension 135862 Unit(s) Oral four times a day  pantoprazole  Injectable 40 milliGRAM(s) IV Push two times a day  Phoxillum Filtration BK 4 / 2.5 5000 milliLiter(s) (1000 mL/Hr) CRRT <Continuous>  Phoxillum Filtration BK 4 / 2.5 5000 milliLiter(s) (200 mL/Hr) CRRT <Continuous>  piperacillin/tazobactam IVPB.. 4.5 Gram(s) IV Intermittent every 8 hours  polyethylene glycol 3350 17 Gram(s) Oral daily  PrismaSATE Dialysate BGK 4 / 2.5 5000 milliLiter(s) (1200 mL/Hr) CRRT <Continuous>  senna 2 Tablet(s) Oral daily  tacrolimus    0.5 mG/mL Suspension 1 milliGRAM(s) Oral <User Schedule>  valGANciclovir 50 mG/mL Oral Solution 450 milliGRAM(s) Oral <User Schedule>    MEDICATIONS  (PRN):      OBJECTIVE:  ICU Vital Signs Last 24 Hrs  T(C): 36.3 (26 Feb 2025 19:00), Max: 37.1 (25 Feb 2025 23:00)  T(F): 97.4 (26 Feb 2025 19:00), Max: 98.8 (25 Feb 2025 23:00)  HR: 102 (26 Feb 2025 20:00) (87 - 135)  BP: 101/47 (26 Feb 2025 20:00) (82/48 - 159/70)  BP(mean): 68 (26 Feb 2025 20:00) (57 - 100)  ABP: --  ABP(mean): --  RR: 14 (26 Feb 2025 20:00) (12 - 32)  SpO2: 99% (26 Feb 2025 20:00) (79% - 100%)    O2 Parameters below as of 26 Feb 2025 20:00  Patient On (Oxygen Delivery Method): room air    I&O's Summary    25 Feb 2025 07:01  -  26 Feb 2025 07:00  --------------------------------------------------------  IN: 316.9 mL / OUT: 318 mL / NET: -1.1 mL    26 Feb 2025 07:01  -  26 Feb 2025 22:54  --------------------------------------------------------  IN: 449.8 mL / OUT: 639 mL / NET: -189.2 mL        CAPILLARY BLOOD GLUCOSE    PHYSICAL EXAMINATION:  General: Comfortable, no acute distress, cooperative with exam.  HEENT: Moist mucous membranes.  Respiratory: CTAB, normal respiratory effort, no coughing, wheezes, crackles, or rales.  CV: RRR, S1S2, no murmurs, rubs or gallops. No JVD. Distal pulses intact.  Abdominal: Soft, nontender, nondistended, no rebound or guarding, normal bowel sounds.  Neurology: AOx0  Extremities: No pitting edema, + Peripheral pulses.  Cath site:   Tubes:    ============================I/O===========================   I&O's Detail    25 Feb 2025 07:01  -  26 Feb 2025 07:00  --------------------------------------------------------  IN:    Dexmedetomidine: 135.9 mL    IV PiggyBack: 175 mL    Vasopressin: 6 mL  Total IN: 316.9 mL    OUT:    Indwelling Catheter - Urethral (mL): 40 mL    Other (mL): 278 mL  Total OUT: 318 mL    Total NET: -1.1 mL      26 Feb 2025 07:01  -  26 Feb 2025 22:54  --------------------------------------------------------  IN:    Dexmedetomidine: 40.8 mL    Dexmedetomidine: 34 mL    Enteral Tube Flush: 170 mL    IV PiggyBack: 205 mL  Total IN: 449.8 mL    OUT:    Indwelling Catheter - Urethral (mL): 0 mL    Other (mL): 639 mL    Vasopressin: 0 mL  Total OUT: 639 mL    Total NET: -189.2 mL        ============================ LABS =========================                        7.8    10.90 )-----------( 151      ( 26 Feb 2025 11:30 )             24.5     02-26    135  |  104  |  22  ----------------------------<  95  4.3   |  22  |  1.77[H]    Ca    8.0[L]      26 Feb 2025 17:14  Phos  2.8     02-26  Mg     2.6     02-26    TPro  5.3[L]  /  Alb  2.5[L]  /  TBili  1.2  /  DBili  x   /  AST  31  /  ALT  12  /  AlkPhos  50  02-26                LIVER FUNCTIONS - ( 26 Feb 2025 17:14 )  Alb: 2.5 g/dL / Pro: 5.3 g/dL / ALK PHOS: 50 U/L / ALT: 12 U/L / AST: 31 U/L / GGT: x           PT/INR - ( 25 Feb 2025 00:22 )   PT: 11.1 sec;   INR: 0.97 ratio         PTT - ( 25 Feb 2025 00:22 )  PTT:29.7 sec    Blood Gas Venous - Lactate: 0.8 mmol/L (02-26-25 @ 03:11)  Blood Gas Venous - Lactate: 2.0 mmol/L (02-25-25 @ 23:25)  Blood Gas Venous - Lactate: 1.2 mmol/L (02-24-25 @ 11:49)  Blood Gas Venous - Lactate: 1.1 mmol/L (02-24-25 @ 03:32)  Blood Gas Venous - Lactate: 0.9 mmol/L (02-24-25 @ 00:27)    Urinalysis Basic - ( 26 Feb 2025 17:14 )    Color: x / Appearance: x / SG: x / pH: x  Gluc: 95 mg/dL / Ketone: x  / Bili: x / Urobili: x   Blood: x / Protein: x / Nitrite: x   Leuk Esterase: x / RBC: x / WBC x   Sq Epi: x / Non Sq Epi: x / Bacteria: x      ======================MICRO/RAD/CARDIO=================  Telemetry: Reviewed   EKG: Reviewed  CXR: Reviewed  Culture: Reviewed   Echo: Limited Transthoracic Echo:   Patient name: BILLY RUSSELL  YOB: 1950   Age: 72 (M)   MR#: [id number]  Study Date: 3/17/2023  [id number] Greg Mckeon RDCS  Study quality: Technically fair  Referring Physician: Brittany Trevizo MD  Blood Pressure: 116/73 mmHg  Height: 170 cm  Weight: 73 kg  BSA: 1.8 m2  Heart Rate: 99 mmHg  ------------------------------------------------------------------------  PROCEDURE: Limited transthoracic echocardiogram with 2-D.  M-Mode and spectral and color flow Doppler.  INDICATION: Abnormal electrocardiogram (ECG) (EKG) (R94.31)  ------------------------------------------------------------------------  Dimensions:    Normal Values:  LA:            2.0 - 4.0 cm  Ao:            2.0 - 3.8 cm  SEPTUM: 1.1    0.6 - 1.2 cm  PWT:    1.1    0.6 - 1.1 cm  LVIDd:  3.7    3.0 - 5.6 cm  LVIDs:  2.5    1.8 - 4.0 cm  Derived variables:  LVMI: 70 g/m2  RWT: 0.59  Fractional short: 32 %  EF (Sherman Rule): 72 %  ------------------------------------------------------------------------  Observations:  Aortic Valve/Aorta:  Aortic Root: 3.5 cm.  Left Ventricle: Hyperdynamic left ventricular systolic  function. Normal left ventricular internal dimensions and  wall thicknesses.  Right Heart: Normal right atrium. Right ventricular  enlargement with normal right ventricular systolic  function. The RV measures 4.5cm at the base and 3.3cm mid  RV.  Pericardium/Pleura: Normal pericardium with no pericardial  effusion.  ------------------------------------------------------------------------  Conclusions:  1. Hyperdynamic left ventricular systolic function.  2. Right ventricular enlargement with normal right  ventricular systolic function. The RV measures 4.5cm at the  base and 3.3cm mid RV.  *** Compared with echocardiogram of3/3/2023, no  significant changes noted. There is improved RV  visualization in the current study.  ------------------------------------------------------------------------  Confirmed on  3/17/2023 - 16:29:36 by Shar Whitney M.D.  ------------------------------------------------------------------------ (03-17-23 @ 13:34)  Transthoracic Echocardiogram:   Patient name: BILLY RUSSELL  YOB: 1950   Age: 72 (M)   MR#: [id number]  Study Date: 3/3/2023  Location: [facility]PSonographer: [facility]  Study quality: Technically difficult  Referring Physician: Kota Garcia MD  Blood Pressure: 120/82 mmHg  Height: 170 cm  Weight: 73 kg  BSA: 1.8 m2  ------------------------------------------------------------------------  PROCEDURE: Transthoracic echocardiogram with 2-D, M-Mode  and complete spectral and color flow Doppler.  INDICATION: Abnormal electrocardiogram (ECG) (EKG) (R94.31)  ------------------------------------------------------------------------  Dimensions:    Normal Values:  LA:     4.7    2.0 - 4.0 cm  Ao:     4.3    2.0 - 3.8 cm  SEPTUM: 1.1    0.6 - 1.2 cm  PWT:    0.9    0.6 - 1.1 cm  LVIDd:  3.6    3.0 - 5.6 cm  LVIDs:  2.8    1.8 - 4.0 cm  Derived variables:  LVMI: 59 g/m2  RWT: 0.50  Fractional short: 22 %  EF (Visual Estimate): 50-55 %  Doppler Peak Velocity (m/sec): AoV=1.1  ------------------------------------------------------------------------  Observations:  Mitral Valve: Normal mitral valve. Mild mitral  regurgitation.  Aortic Valve/Aorta: Normal trileaflet aortic valve. Peak  transaortic valve gradient equals 5 mm Hg, mean transaortic  valve gradient equals 3 mm Hg, aortic valve velocity time  integral equals 15 cm. Peak left ventricular outflow tract  gradient equals 6 mm Hg, mean gradient is equal to 4 mm Hg,  LVOT velocity time integral equals 15 cm.  Aortic Root: 4.3 cm.  Ascending Aorta:3.1 cm.  LVOT diameter: 2.3 cm.  Left Atrium: LA volume index = 11 cc/m2.  Left Ventricle: Endocardium not well visualized; grossly  normal left ventricular systolic function. Increased  relative wall thickness with normal left ventricular mass  index, consistent with concentric left ventricular  remodeling. Indeterminate diastolic function.  Right Heart: Normal right atrium. The right ventricle is  not well visualized; grossly normal right ventricular  systolic function. Normal tricuspid valve. Mild tricuspid  regurgitation. Normal pulmonic valve.  Pericardium/Pleura: Normal pericardium with no pericardial  effusion.  Hemodynamic: Estimated right atrial pressure is 8 mm Hg.  Estimated right ventricular systolic pressure equals 31 mm  Hg, assuming right atrial pressure equals 8 mm Hg,  consistent with normal pulmonary pressures.  ------------------------------------------------------------------------  Conclusions:  1. Increased relative wall thickness with normal left  ventricular mass index, consistent with concentric left  ventricular remodeling.  2. Endocardium not well visualized; grossly normal left  ventricular systolic function.  3. The right ventricle is not well visualized; grossly  normal right ventricular systolic function.  Heart rate 132 bpm  ------------------------------------------------------------------------  Confirmed on  3/3/2023 - 17:37:10 by FRANSISCA Ronquillo  ------------------------------------------------------------------------ (03-03-23 @ 15:03)    Cath:   ====================ASSESSMENT ==============  75yo M w/ pmhx HTN, HLD, nonischemic cardiomyopathy, chronic systolic heart failure s/p HM2 LVAD (6/2017), s/p heart transplant from Hep. 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 Found to have septic shock, (2/19 BCx pseudo, 2/22 BCx NGTD). Still episodes of hypotension. Worsening NORMAN. Accepted to MICU for CRRT.    Plan:  ====================== NEUROLOGY=====================  # Metabolic encephalopathy.  - Likely multi-factorial etiologies: septic vs uremic vs hypercapnic, less likely meningitis  - Patient baseline AOx3. AOx1 at ED arrival, iso of infection vs metabolic derangements (uremic encephelopathy)  - CTH and angio w/o hemorrhage or stenosis   - TSH wnl / B12, CK 2200  - Fall precautions  - spot EEG 2/21: negative for seizures   - Currently A&O xO    Plan  - Monitor MS w/ abx and CRRT  - Neurology consulted     dexMEDEtomidine Infusion 0.5 MICROgram(s)/kG/Hr IV Continuous <Continuous>  fentaNYL    Injectable 25 MICROGram(s) IV Push once    ==================== RESPIRATORY======================  #Metabolic acidosis  - respiratory and metabolic acidosis VpH 7.17, PCO2 57 on admission  - pH improving, likely as respiratory component has improved  - Remains on room air    ====================CARDIOVASCULAR==================  #Septic shock from pseudomonas bacteremia  - POCUS reveals no evidence of  fluid overload  - Pt is also likely intravascular depleted with ileus and poor po intake.   - S/p IVF  - Patient volume up hold off IVF, now running net even on CRRT  - Pressors: s/p levo, off 2/24    Plan  - F/u transplant ID recs - CMV ordered    # H/O heart transplant.   - Nonischemic cardiomyopathy, chronic systolic heart failure s/p HM2 LVAD (6/2017), s/p heart transplant from Hep. C donor (treated) 2/23/18   - Home Tacrolimus dose 2mg BID, Pred 15mg qd  - Intolerant of Cellcept due to leukopenia.  - Continue with home Pred 15mg qd  - Hold home Bactrim given hyperkalemia, now on Atovaquone for ppx  - ASA  - c/w home Atorvastatin (therapeutic interchange)   - HOLD home Metoprolol from 200mg with hold parameters due to hypotension (02/23)  - Tacro level daily : home regimen 2mg BID, c/w 0.8mg BID-> will adjust by level today     # Paroxysmal atrial fibrillation.   - Home regimen : Eliquis 5 mg PO BID at home for hx of of afib and IJ thrombi  - EKG on admission - nsr   - [id number]  = 3  - Last Dose : 2/20 AM   - Currently on HSQ    # Hypertension (chronic)  - Hold home Toprol  mg PO QD ISO hypotension  - Hold home Losartan 75 mg PO QD due to NORMAN & hypotension.    # Vascular disorder of lower extremity.   - Extremities appear cool and dry   - c/w Local wound care by podiatry   - Podiatry recommendations - pending SHANEKA studies, consider vascular evaluation if abnormal.    ===================HEMATOLOGIC/ONC ===================  # Hx of hemolytic anemia  - hx of hemolytic anemia, follows with Dr. Rosario as outpatient  - c/w Prednisone 15 mg PO QD   - f/u hematology/oncology recs  - Monitor H&H daily.  - f/u hemolysis labs and peripheral smear    # DVT ppx  - restart heparin sq  - SCD    aspirin  chewable 81 milliGRAM(s) Oral daily    ===================== RENAL =========================  NORMAN on CKD  ATN 2/2 septic shock  - Known hx of CKD, Cr now uptrending  - Scr ranged from 1.3-2.1 in 2023. Most recent Scr was 1.75 on 1/30/24. On admission, Scr was 1.9 and is worsening now. UA showed trace ketones.   - No improvement with IVF, likely ATN i/s/o contrast/medications.   - S/p double lumen L fem catheter  - CRRT net even  - c/w renvela for hyperphosphatemia  - CRRT as per nephro    #Hyperkalemia (resolved)   - K 6.2 at ED arrival  - shifted in the ED, no EKG changes consistent with severe hyper K. Cr not significantly elevated form baseline, c/f RTA from bactrim and Tacro. Stable   - s/p Lokelma 10mg BID for 3 days  - f/u Tacro level daily   - Hold home bactrim - ID consult regarding Atovaquone   - c/w monitoring patient on Tele.  - CRRT as above    ==================== GASTROINTESTINAL===================  #Ileus  #+/- coffee ground emesis  - Ileus could potentially be source of infx  - 1 episode of coffee ground emesis, unclear if true GIB  - Hb stable   - CTAP (2/22) ileus vs partial SBO - cannot r/o GI bleed  - NGT placed set to LIS  - monitor for further episodes  - c/w PPI IV 40 BID   - c/w trending CBC Q12H  - bowel regimen: senna, miralax  - Restart DVT Ppx/ASA for now; if no more GIB and HgB stable  - if repeat episode, GI consult   - If repeat episode or worsening lactate, CT angiogram with venous phase.    pantoprazole  Injectable 40 milliGRAM(s) IV Push two times a day  polyethylene glycol 3350 17 Gram(s) Oral daily  senna 2 Tablet(s) Oral daily    =======================    ENDOCRINE  =====================  # Diabetes type 2.   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 No manifest signs of bowel ischemia. Interval worsening of right lower lobe and right middle lobe atelectasis secondary to elevated right hemidiaphragm when compared to prior CT dated 7/11/2023. Superimposed infection in the collapsed lungs is not excluded  - s/p meropenem (2/21-2/24)  - Hold off additional doses of Vancomycin (MRSA swab negative)  - c/w home Valganciclovir for ppx renally dosed   - f/u infectious labs - Toxoplasma, EBV, Fungitell, Galactomannan, CMV, Cryptococcus, MRSA, ASCENCION virus, CMV, Crypto, ASCENCION virus  - f/u GI PCR, consider C. Diff testing   - Transplant ID recommendations   - Consider LP if MS not improving  - Warm blanket    tacrolimus    0.5 mG/mL Suspension 1 milliGRAM(s) Oral <User Schedule>    atovaquone  Suspension 1500 milliGRAM(s) Oral daily  nystatin    Suspension 369483 Unit(s) Oral four times a day  piperacillin/tazobactam IVPB.. 4.5 Gram(s) IV Intermittent every 8 hours  valGANciclovir 50 mG/mL Oral Solution 450 milliGRAM(s) Oral <User Schedule>    chlorhexidine 2% Cloths 1 Application(s) Topical daily    Patient requires continuous monitoring with bedside rhythm monitoring, pulse ox monitoring, and intermittent blood gas analysis. Care plan discussed with ICU care team. Patient remained critical and at risk for life threatening decompensation.  Patient seen, examined and plan discussed with CCU team during rounds.     I have personally provided ____ minutes of critical care time excluding time spent on separate procedures, in addition to initial critical care time provided by the CICU Attending, Dr. Santiago.     By signing my name below, I, Estela Hameed, attest that this documentation has been prepared under the direction and in the presence of Alana James NP  Electronically signed: Katty Riggs, 02-26-25 @ 22:54    I, Alana James NP, personally performed the services described in this documentation. all medical record entries made by the ramuibkurt were at my direction and in my presence. I have reviewed the chart and agree that the record reflects my personal performance and is accurate and complete  Electronically signed: Alana James NP       BILLY RUSSELL  MRN-[id number]  Patient is a 74y old  Male who presents with a chief complaint of Hyperkalemia     (26 Feb 2025 13:35)    HPI:  73yo M pmhx HTN, HLD, nonischemic cardiomyopathy, chronic systolic heart failure s/p HM2 LVAD (6/2017), s/p heart transplant from Hep. 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 On the phone, the patient's godbrother mentioned that on 2/18, the patient was in the car with his godbrother and was disoriented asking to get off on the road 4 blocks before his house. In addition, a caretaker stated that when she spoke to Mr. Hameed on the phone at 11am on 2/18, he was doing well. However, when the caretaker visited 2 hours later at 1pm, the patient was disoriented and felt his legs were heavy. This prompted the patient to come to the hospital.     In the ED: Hypothermic 91.4, HR 80, /60, RA   Patient noted to be Hyperkalemic (6.2) with Mixed respiratory and metabolic acidosis pH 7.2. - Patient was given Calcium Gluconate 2g, D50/Insulin 5 unit, 40 mg IV lasix, Lokelma 10mg.   Vancomycin and Zosyn  (20 Feb 2025 07:21)    INTERVAL HPI/OVERNIGHT EVENTS: No acute events overnight.    REVIEW OF SYSTEMS: Limited 2/2 mental status, Denies pain SOB    MEDICATIONS  (STANDING):  aspirin  chewable 81 milliGRAM(s) Oral daily  atorvastatin 10 milliGRAM(s) Oral at bedtime  atovaquone  Suspension 1500 milliGRAM(s) Oral daily  chlorhexidine 2% Cloths 1 Application(s) Topical daily  cholecalciferol 1000 Unit(s) Oral daily  CRRT Treatment    <Continuous>  dexMEDEtomidine Infusion 0.5 MICROgram(s)/kG/Hr (11.3 mL/Hr) IV Continuous <Continuous>  fentaNYL    Injectable 25 MICROGram(s) IV Push once  hydrocortisone sodium succinate Injectable 75 milliGRAM(s) IV Push every 8 hours  insulin lispro (ADMELOG) corrective regimen sliding scale   SubCutaneous every 6 hours  nystatin    Suspension 985607 Unit(s) Oral four times a day  pantoprazole  Injectable 40 milliGRAM(s) IV Push two times a day  Phoxillum Filtration BK 4 / 2.5 5000 milliLiter(s) (1000 mL/Hr) CRRT <Continuous>  Phoxillum Filtration BK 4 / 2.5 5000 milliLiter(s) (200 mL/Hr) CRRT <Continuous>  piperacillin/tazobactam IVPB.. 4.5 Gram(s) IV Intermittent every 8 hours  polyethylene glycol 3350 17 Gram(s) Oral daily  PrismaSATE Dialysate BGK 4 / 2.5 5000 milliLiter(s) (1200 mL/Hr) CRRT <Continuous>  senna 2 Tablet(s) Oral daily  tacrolimus    0.5 mG/mL Suspension 1 milliGRAM(s) Oral <User Schedule>  valGANciclovir 50 mG/mL Oral Solution 450 milliGRAM(s) Oral <User Schedule>    MEDICATIONS  (PRN):      OBJECTIVE:  ICU Vital Signs Last 24 Hrs  T(C): 36.3 (26 Feb 2025 19:00), Max: 37.1 (25 Feb 2025 23:00)  T(F): 97.4 (26 Feb 2025 19:00), Max: 98.8 (25 Feb 2025 23:00)  HR: 102 (26 Feb 2025 20:00) (87 - 135)  BP: 101/47 (26 Feb 2025 20:00) (82/48 - 159/70)  BP(mean): 68 (26 Feb 2025 20:00) (57 - 100)  ABP: --  ABP(mean): --  RR: 14 (26 Feb 2025 20:00) (12 - 32)  SpO2: 99% (26 Feb 2025 20:00) (79% - 100%)    O2 Parameters below as of 26 Feb 2025 20:00  Patient On (Oxygen Delivery Method): room air    I&O's Summary    25 Feb 2025 07:01  -  26 Feb 2025 07:00  --------------------------------------------------------  IN: 316.9 mL / OUT: 318 mL / NET: -1.1 mL    26 Feb 2025 07:01  -  26 Feb 2025 22:54  --------------------------------------------------------  IN: 449.8 mL / OUT: 639 mL / NET: -189.2 mL        CAPILLARY BLOOD GLUCOSE    PHYSICAL EXAMINATION:  General: Comfortable, no acute distress, cooperative with exam.  HEENT: Moist mucous membranes.  Respiratory: CTAB, normal respiratory effort, no coughing, wheezes, crackles, or rales.  CV: RRR, S1S2, no murmurs, rubs or gallops. No JVD. Distal pulses intact.  Abdominal: Soft, nontender, nondistended, no rebound or guarding, normal bowel sounds.  Neurology: AOx0  Extremities: No pitting edema, + Peripheral pulses.  Cath site:   Tubes:    ============================I/O===========================   I&O's Detail    25 Feb 2025 07:01  -  26 Feb 2025 07:00  --------------------------------------------------------  IN:    Dexmedetomidine: 135.9 mL    IV PiggyBack: 175 mL    Vasopressin: 6 mL  Total IN: 316.9 mL    OUT:    Indwelling Catheter - Urethral (mL): 40 mL    Other (mL): 278 mL  Total OUT: 318 mL    Total NET: -1.1 mL      26 Feb 2025 07:01  -  26 Feb 2025 22:54  --------------------------------------------------------  IN:    Dexmedetomidine: 40.8 mL    Dexmedetomidine: 34 mL    Enteral Tube Flush: 170 mL    IV PiggyBack: 205 mL  Total IN: 449.8 mL    OUT:    Indwelling Catheter - Urethral (mL): 0 mL    Other (mL): 639 mL    Vasopressin: 0 mL  Total OUT: 639 mL    Total NET: -189.2 mL        ============================ LABS =========================                        7.8    10.90 )-----------( 151      ( 26 Feb 2025 11:30 )             24.5     02-26    135  |  104  |  22  ----------------------------<  95  4.3   |  22  |  1.77[H]    Ca    8.0[L]      26 Feb 2025 17:14  Phos  2.8     02-26  Mg     2.6     02-26    TPro  5.3[L]  /  Alb  2.5[L]  /  TBili  1.2  /  DBili  x   /  AST  31  /  ALT  12  /  AlkPhos  50  02-26                LIVER FUNCTIONS - ( 26 Feb 2025 17:14 )  Alb: 2.5 g/dL / Pro: 5.3 g/dL / ALK PHOS: 50 U/L / ALT: 12 U/L / AST: 31 U/L / GGT: x           PT/INR - ( 25 Feb 2025 00:22 )   PT: 11.1 sec;   INR: 0.97 ratio         PTT - ( 25 Feb 2025 00:22 )  PTT:29.7 sec    Blood Gas Venous - Lactate: 0.8 mmol/L (02-26-25 @ 03:11)  Blood Gas Venous - Lactate: 2.0 mmol/L (02-25-25 @ 23:25)  Blood Gas Venous - Lactate: 1.2 mmol/L (02-24-25 @ 11:49)  Blood Gas Venous - Lactate: 1.1 mmol/L (02-24-25 @ 03:32)  Blood Gas Venous - Lactate: 0.9 mmol/L (02-24-25 @ 00:27)    Urinalysis Basic - ( 26 Feb 2025 17:14 )    Color: x / Appearance: x / SG: x / pH: x  Gluc: 95 mg/dL / Ketone: x  / Bili: x / Urobili: x   Blood: x / Protein: x / Nitrite: x   Leuk Esterase: x / RBC: x / WBC x   Sq Epi: x / Non Sq Epi: x / Bacteria: x      ======================MICRO/RAD/CARDIO=================  Telemetry: Reviewed   EKG: Reviewed  CXR: Reviewed  Culture: Reviewed   Echo: Limited Transthoracic Echo:   Patient name: BILLY RUSSELL  YOB: 1950   Age: 72 (M)   MR#: [id number]  Study Date: 3/17/2023  [id number] Greg Mckeon RDCS  Study quality: Technically fair  Referring Physician: Brittany Trevizo MD  Blood Pressure: 116/73 mmHg  Height: 170 cm  Weight: 73 kg  BSA: 1.8 m2  Heart Rate: 99 mmHg  ------------------------------------------------------------------------  PROCEDURE: Limited transthoracic echocardiogram with 2-D.  M-Mode and spectral and color flow Doppler.  INDICATION: Abnormal electrocardiogram (ECG) (EKG) (R94.31)  ------------------------------------------------------------------------  Dimensions:    Normal Values:  LA:            2.0 - 4.0 cm  Ao:            2.0 - 3.8 cm  SEPTUM: 1.1    0.6 - 1.2 cm  PWT:    1.1    0.6 - 1.1 cm  LVIDd:  3.7    3.0 - 5.6 cm  LVIDs:  2.5    1.8 - 4.0 cm  Derived variables:  LVMI: 70 g/m2  RWT: 0.59  Fractional short: 32 %  EF (Sherman Rule): 72 %  ------------------------------------------------------------------------  Observations:  Aortic Valve/Aorta:  Aortic Root: 3.5 cm.  Left Ventricle: Hyperdynamic left ventricular systolic  function. Normal left ventricular internal dimensions and  wall thicknesses.  Right Heart: Normal right atrium. Right ventricular  enlargement with normal right ventricular systolic  function. The RV measures 4.5cm at the base and 3.3cm mid  RV.  Pericardium/Pleura: Normal pericardium with no pericardial  effusion.  ------------------------------------------------------------------------  Conclusions:  1. Hyperdynamic left ventricular systolic function.  2. Right ventricular enlargement with normal right  ventricular systolic function. The RV measures 4.5cm at the  base and 3.3cm mid RV.  *** Compared with echocardiogram of3/3/2023, no  significant changes noted. There is improved RV  visualization in the current study.  ------------------------------------------------------------------------  Confirmed on  3/17/2023 - 16:29:36 by Shar Whitney M.D.  ------------------------------------------------------------------------ (03-17-23 @ 13:34)  Transthoracic Echocardiogram:   Patient name: BILLY RUSSELL  YOB: 1950   Age: 72 (M)   MR#: [id number]  Study Date: 3/3/2023  Location: O/PSonographer: [facility]ett [facility]  Study quality: Technically difficult  Referring Physician: Kota Garcia MD  Blood Pressure: 120/82 mmHg  Height: 170 cm  Weight: 73 kg  BSA: 1.8 m2  ------------------------------------------------------------------------  PROCEDURE: Transthoracic echocardiogram with 2-D, M-Mode  and complete spectral and color flow Doppler.  INDICATION: Abnormal electrocardiogram (ECG) (EKG) (R94.31)  ------------------------------------------------------------------------  Dimensions:    Normal Values:  LA:     4.7    2.0 - 4.0 cm  Ao:     4.3    2.0 - 3.8 cm  SEPTUM: 1.1    0.6 - 1.2 cm  PWT:    0.9    0.6 - 1.1 cm  LVIDd:  3.6    3.0 - 5.6 cm  LVIDs:  2.8    1.8 - 4.0 cm  Derived variables:  LVMI: 59 g/m2  RWT: 0.50  Fractional short: 22 %  EF (Visual Estimate): 50-55 %  Doppler Peak Velocity (m/sec): AoV=1.1  ------------------------------------------------------------------------  Observations:  Mitral Valve: Normal mitral valve. Mild mitral  regurgitation.  Aortic Valve/Aorta: Normal trileaflet aortic valve. Peak  transaortic valve gradient equals 5 mm Hg, mean transaortic  valve gradient equals 3 mm Hg, aortic valve velocity time  integral equals 15 cm. Peak left ventricular outflow tract  gradient equals 6 mm Hg, mean gradient is equal to 4 mm Hg,  LVOT velocity time integral equals 15 cm.  Aortic Root: 4.3 cm.  Ascending Aorta:3.1 cm.  LVOT diameter: 2.3 cm.  Left Atrium: LA volume index = 11 cc/m2.  Left Ventricle: Endocardium not well visualized; grossly  normal left ventricular systolic function. Increased  relative wall thickness with normal left ventricular mass  index, consistent with concentric left ventricular  remodeling. Indeterminate diastolic function.  Right Heart: Normal right atrium. The right ventricle is  not well visualized; grossly normal right ventricular  systolic function. Normal tricuspid valve. Mild tricuspid  regurgitation. Normal pulmonic valve.  Pericardium/Pleura: Normal pericardium with no pericardial  effusion.  Hemodynamic: Estimated right atrial pressure is 8 mm Hg.  Estimated right ventricular systolic pressure equals 31 mm  Hg, assuming right atrial pressure equals 8 mm Hg,  consistent with normal pulmonary pressures.  ------------------------------------------------------------------------  Conclusions:  1. Increased relative wall thickness with normal left  ventricular mass index, consistent with concentric left  ventricular remodeling.  2. Endocardium not well visualized; grossly normal left  ventricular systolic function.  3. The right ventricle is not well visualized; grossly  normal right ventricular systolic function.  Heart rate 132 bpm  ------------------------------------------------------------------------  Confirmed on  3/3/2023 - 17:37:10 by FRANSISCA Ronquillo  ------------------------------------------------------------------------ (03-03-23 @ 15:03)    Cath:   ====================ASSESSMENT ==============  73yo M w/ pmhx HTN, HLD, nonischemic cardiomyopathy, chronic systolic heart failure s/p HM2 LVAD (6/2017), s/p heart transplant from Hep. 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 Found to have septic shock, (2/19 BCx pseudo, 2/22 BCx NGTD). Still episodes of hypotension. Worsening NORMAN. Accepted to MICU for CRRT.    Plan:  ====================== NEUROLOGY=====================  # Metabolic encephalopathy.  - Likely multi-factorial etiologies: septic vs uremic vs hypercapnic, less likely meningitis  - Patient baseline AOx3. AOx1 at ED arrival, iso of infection vs metabolic derangements (uremic encephelopathy)  - CTH and angio w/o hemorrhage or stenosis   - TSH wnl / B12, CK 2200  - Fall precautions  - spot EEG 2/21: negative for seizures   - Currently A&O xO    Plan  - Monitor MS w/ abx and CRRT  - Neurology consulted     dexMEDEtomidine Infusion 0.5 MICROgram(s)/kG/Hr IV Continuous <Continuous>  fentaNYL    Injectable 25 MICROGram(s) IV Push once    ==================== RESPIRATORY======================  #Metabolic acidosis  - respiratory and metabolic acidosis VpH 7.17, PCO2 57 on admission  - pH improving, likely as respiratory component has improved  - Remains on room air    ====================CARDIOVASCULAR==================  #Septic shock from pseudomonas bacteremia  - POCUS reveals no evidence of  fluid overload  - Pt is also likely intravascular depleted with ileus and poor po intake.   - S/p IVF  - Patient volume up hold off IVF, now running net even on CRRT  - Pressors: s/p levo, off 2/24    Plan  - F/u transplant ID recs - CMV ordered    # H/O heart transplant.   - Nonischemic cardiomyopathy, chronic systolic heart failure s/p HM2 LVAD (6/2017), s/p heart transplant from Hep. C donor (treated) 2/23/18   - Home Tacrolimus dose 2mg BID, Pred 15mg qd  - Intolerant of Cellcept due to leukopenia.  - Continue with home Pred 15mg qd  - Hold home Bactrim given hyperkalemia, now on Atovaquone for ppx  - ASA  - c/w home Atorvastatin (therapeutic interchange)   - HOLD home Metoprolol from 200mg with hold parameters due to hypotension (02/23)  - Tacro level daily : home regimen 2mg BID, c/w 0.8mg BID-> will adjust by level today     # Paroxysmal atrial fibrillation.   - Home regimen : Eliquis 5 mg PO BID at home for hx of of afib and IJ thrombi  - EKG on admission - nsr   - [id number]  = 3  - Last Dose : 2/20 AM   - Currently on HSQ    # Hypertension (chronic)  - Hold home Toprol  mg PO QD ISO hypotension  - Hold home Losartan 75 mg PO QD due to NORMAN & hypotension.    # Vascular disorder of lower extremity.   - Extremities appear cool and dry   - c/w Local wound care by podiatry   - Podiatry recommendations - pending SHANEKA studies, consider vascular evaluation if abnormal.    ===================HEMATOLOGIC/ONC ===================  # Hx of hemolytic anemia  - hx of hemolytic anemia, follows with Dr. Rosario as outpatient  - c/w Prednisone 15 mg PO QD   - f/u hematology/oncology recs  - Monitor H&H daily.  - f/u hemolysis labs and peripheral smear    # DVT ppx  - restart heparin sq  - SCD    aspirin  chewable 81 milliGRAM(s) Oral daily    ===================== RENAL =========================  NORMAN on CKD  ATN 2/2 septic shock  - Known hx of CKD, Cr now uptrending  - Scr ranged from 1.3-2.1 in 2023. Most recent Scr was 1.75 on 1/30/24. On admission, Scr was 1.9 and is worsening now. UA showed trace ketones.   - No improvement with IVF, likely ATN i/s/o contrast/medications.   - S/p double lumen L fem catheter  - CRRT net even  - c/w renvela for hyperphosphatemia  - CRRT as per nephro    #Hyperkalemia (resolved)   - K 6.2 at ED arrival  - shifted in the ED, no EKG changes consistent with severe hyper K. Cr not significantly elevated form baseline, c/f RTA from bactrim and Tacro. Stable   - s/p Lokelma 10mg BID for 3 days  - f/u Tacro level daily   - Hold home bactrim - ID consult regarding Atovaquone   - c/w monitoring patient on Tele.  - CRRT as above    ==================== GASTROINTESTINAL===================  #Ileus  #+/- coffee ground emesis  - Ileus could potentially be source of infx  - 1 episode of coffee ground emesis, unclear if true GIB  - Hb stable   - CTAP (2/22) ileus vs partial SBO - cannot r/o GI bleed  - NGT placed set to LIS  - monitor for further episodes  - c/w PPI IV 40 BID   - c/w trending CBC Q12H  - bowel regimen: senna, miralax  - Restart DVT Ppx/ASA for now; if no more GIB and HgB stable  - if repeat episode, GI consult   - If repeat episode or worsening lactate, CT angiogram with venous phase.    pantoprazole  Injectable 40 milliGRAM(s) IV Push two times a day  polyethylene glycol 3350 17 Gram(s) Oral daily  senna 2 Tablet(s) Oral daily    =======================    ENDOCRINE  =====================  # Diabetes type 2.   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 No manifest signs of bowel ischemia. Interval worsening of right lower lobe and right middle lobe atelectasis secondary to elevated right hemidiaphragm when compared to prior CT dated 7/11/2023. Superimposed infection in the collapsed lungs is not excluded  - s/p meropenem (2/21-2/24)  - Hold off additional doses of Vancomycin (MRSA swab negative)  - c/w home Valganciclovir for ppx renally dosed   - f/u infectious labs - Toxoplasma, EBV, Fungitell, Galactomannan, CMV, Cryptococcus, MRSA, ASCENCION virus, CMV, Crypto, ASCENCION virus  - f/u GI PCR, consider C. Diff testing   - Transplant ID recommendations   - Consider LP if MS not improving  - Warm blanket    tacrolimus    0.5 mG/mL Suspension 1 milliGRAM(s) Oral <User Schedule>    atovaquone  Suspension 1500 milliGRAM(s) Oral daily  nystatin    Suspension 378137 Unit(s) Oral four times a day  piperacillin/tazobactam IVPB.. 4.5 Gram(s) IV Intermittent every 8 hours  valGANciclovir 50 mG/mL Oral Solution 450 milliGRAM(s) Oral <User Schedule>    chlorhexidine 2% Cloths 1 Application(s) Topical daily    Patient requires continuous monitoring with bedside rhythm monitoring, pulse ox monitoring, and intermittent blood gas analysis. Care plan discussed with ICU care team. Patient remained critical and at risk for life threatening decompensation.  Patient seen, examined and plan discussed with CCU team during rounds.     I have personally provided __35__ minutes of critical care time excluding time spent on separate procedures, in addition to initial critical care time provided by the CICU Attending, Dr. Santiago.     By signing my name below, I, Estela Hameed, attest that this documentation has been prepared under the direction and in the presence of Alana James NP  Electronically signed: Katty Riggs, 02-26-25 @ 22:54    I, Alana James NP, personally performed the services described in this documentation. all medical record entries made by the ramuibkurt were at my direction and in my presence. I have reviewed the chart and agree that the record reflects my personal performance and is accurate and complete  Electronically signed: Alana James NP

## 2025-02-26 NOTE — PROGRESS NOTE ADULT - SUBJECTIVE AND OBJECTIVE BOX
Neurology - Progress Note    -  Spectra: [phone] ([facility]), [phone] ([facility])  -    Interval History: Patient received precedex last night, has been lethargic since then.       Review of Systems:    Unable to assess     Allergies:  No Known Allergies      PMHx/PSHx/Family Hx: As above, otherwise see below   CHF (Congestive Heart Failure)    HTN    SVT (Supraventricular Tachycardia)    Non-Ischemic Cardiomyopathy    PAF (paroxysmal atrial fibrillation)    Ventricular fibrillation    H/O prior ablation treatment    GIB (gastrointestinal bleeding)    Hepatitis C virus    DVT of upper extremity (deep vein thrombosis)    Former smoker    HLD (hyperlipidemia)    Knee pain, right    H/O autoimmune hemolytic anemia    H/O hemolytic anemia    Atrial fibrillation        Social Hx:  No current use of tobacco, alcohol, or illicit drugs      Medications:  MEDICATIONS  (STANDING):  aspirin  chewable 81 milliGRAM(s) Oral daily  atorvastatin 10 milliGRAM(s) Oral at bedtime  atovaquone  Suspension 1500 milliGRAM(s) Oral daily  chlorhexidine 2% Cloths 1 Application(s) Topical daily  cholecalciferol 1000 Unit(s) Oral daily  CRRT Treatment    <Continuous>  dexMEDEtomidine Infusion 0.5 MICROgram(s)/kG/Hr (11.3 mL/Hr) IV Continuous <Continuous>  hydrocortisone sodium succinate Injectable 75 milliGRAM(s) IV Push every 8 hours  insulin lispro (ADMELOG) corrective regimen sliding scale   SubCutaneous every 6 hours  nystatin    Suspension [id number] Unit(s) Oral four times a day  pantoprazole  Injectable 40 milliGRAM(s) IV Push two times a day  Phoxillum Filtration BK 4 / 2.5 5000 milliLiter(s) (1000 mL/Hr) CRRT <Continuous>  Phoxillum Filtration BK 4 / 2.5 5000 milliLiter(s) (200 mL/Hr) CRRT <Continuous>  piperacillin/tazobactam IVPB.. 4.5 Gram(s) IV Intermittent every 8 hours  polyethylene glycol 3350 17 Gram(s) Oral daily  PrismaSATE Dialysate BGK 4 / 2.5 5000 milliLiter(s) (1200 mL/Hr) CRRT <Continuous>  senna 2 Tablet(s) Oral daily  tacrolimus    0.5 mG/mL Suspension 1 milliGRAM(s) Oral <User Schedule>  valGANciclovir 50 mG/mL Oral Solution 450 milliGRAM(s) Oral <User Schedule>  vasopressin Infusion 0.04 Unit(s)/Min (6 mL/Hr) IV Continuous <Continuous>    MEDICATIONS  (PRN):      Vitals:  T(C): 36.6 (02-26-25 @ 11:00), Max: 37.1 (02-25-25 @ 15:00)  HR: 87 (02-26-25 @ 12:00) (87 - 135)  BP: 95/54 (02-26-25 @ 12:00) (82/48 - 161/74)  RR: 23 (02-26-25 @ 12:00) (12 - 38)  SpO2: 94% (02-26-25 @ 12:00) (79% - 100%)    Physical Examination:   General - NAD, room air    Neurologic Exam:  Mental status - Awake, Alert, some verbal output-but mumbling, able to make out 1-2 words, not following commands    Cranial nerves - PERRL,  eyes cross midline, face grossly symmetric  Motor -  Strength testing  Moving extremities in plane of bed x4      Coordination - No tremors appreciated    Gait and station - Unable to assess   Labs:                        7.8    10.90 )-----------( 151      ( 26 Feb 2025 11:30 )             24.5     02-26    136  |  102  |  23  ----------------------------<  110[H]  4.4   |  24  |  1.75[H]    Ca    8.0[L]      26 Feb 2025 11:30  Phos  3.0     02-26  Mg     2.7     02-26    TPro  5.3[L]  /  Alb  2.5[L]  /  TBili  1.2  /  DBili  x   /  AST  33  /  ALT  13  /  AlkPhos  46  02-26    CAPILLARY BLOOD GLUCOSE      POCT Blood Glucose.: 126 mg/dL (26 Feb 2025 11:13)    LIVER FUNCTIONS - ( 26 Feb 2025 11:30 )  Alb: 2.5 g/dL / Pro: 5.3 g/dL / ALK PHOS: 46 U/L / ALT: 13 U/L / AST: 33 U/L / GGT: x             Culture - Blood (collected 24 Feb 2025 00:35)  Source: .Blood Blood-Peripheral  Preliminary Report (26 Feb 2025 03:01):    No growth at 48 Hours    Culture - Blood (collected 24 Feb 2025 00:20)  Source: .Blood Blood-Peripheral  Preliminary Report (26 Feb 2025 03:01):    No growth at 48 Hours      PT/INR - ( 25 Feb 2025 00:22 )   PT: 11.1 sec;   INR: 0.97 ratio         PTT - ( 25 Feb 2025 00:22 )  PTT:29.7 sec

## 2025-02-26 NOTE — DIETITIAN INITIAL EVALUATION ADULT - PROBLEM SELECTOR PLAN 6
nonischemic cardiomyopathy, chronic systolic heart failure s/p HM2 LVAD (6/2017), s/p heart transplant from Hep. C donor (treated) 2/23/18   - Home Tacrolimus dose 2mg BID, Pred 15mg qd  > Continue with home Pred 15mg qd  > Hold home Bactrim given hyperkalemia  > c/w home ASA, Atorvastatin (therapeutic interchange)   > c/w home Metoprolol from 200mg with hold parameters   > Tacro level daily : home regimen 2mg BID, c/w 1mg BID  Note:  intolerant of Cellcept due to leukopenia

## 2025-02-26 NOTE — DIETITIAN INITIAL EVALUATION ADULT - PROBLEM SELECTOR PLAN 8
Home regimen : Eliquis 5 mg PO BID at home for hx of of afib and IJ thrombi  EKG on admission - nsr   [id number]  = 3  Last Dose : 2/20 AM   - currently holding in anticipation for LP 2/24

## 2025-02-26 NOTE — PROGRESS NOTE ADULT - ASSESSMENT
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 On exam, patient on room air, awake, not following commands.     Impression: AMS multifactorial causes like recent sedative use, toxic, metabolic, infectious etiology, hospital delirium vs rule out CNS inflammatory or infectious causes     Recommendations:  [] MRI B w/w/o if able, otherwise can do without contrast       Seen with Dr. Tidwell.

## 2025-02-26 NOTE — DIETITIAN INITIAL EVALUATION ADULT - FLUID ACCUMULATION
edema per flowsheets: 1+ generalized; 2+ left arm, left hand, right leg, left leg, left ankle, right ankle, left foot, right foot

## 2025-02-26 NOTE — DIETITIAN INITIAL EVALUATION ADULT - OTHER INFO
Dosing weight: 200lb/90.7kg ().  Daily weights in k.2 (), 77.8 (), 78.2 ()  IBW: 178lb.  Weight history per Lianet FALK: unable to obtain. Per most previous RD note 23, weight was 174lb,  ? accuracy of dosing weight in setting of daily weights. Current Weight fluctuations likely in setting of fluid shifts. RD to continue to monitor weight trends as available/able.     Nutrition-Related Concerns  -CTAP () ileus vs partial SBO - cannot r/o GI bleed  -NORMAN on CKD, started CVVHDF  -Hx chronic systolic heart failure s/p HM2 LVAD (2017), s/p heart transplant 18. ordered for tacrolimus  -septic shock  -steroid which can increase blood glucose levels, insulin for glycemic control

## 2025-02-26 NOTE — DIETITIAN INITIAL EVALUATION ADULT - REASON INDICATOR FOR ASSESSMENT
RD assessment warranted for: ICU length of stay. Chart reviewed, events noted.  Source: medical record, RN.

## 2025-02-26 NOTE — PROGRESS NOTE ADULT - CRITICAL CARE ATTENDING COMMENT
meds: prograf .8 bid (2.9, 7.4, 8), mepron, valcyte 450 tiw, asa, statin, PPI, solucortef 75 Q 8,   s/p neck line insertion which required sedation- patient is much more confused.   loose stools.   CVP 6, CVP sat 64.   HR 90, 90/-120/ afebrile, RA.   abdo distended non tender.   anuric.   I/O: ? even.   02-26    134[L]  |  103  |  24[H]  ----------------------------<  118[H]  4.9   |  22  |  1.86[H]    Ca    8.5      26 Feb 2025 06:39  Phos  3.0     02-26  Mg     2.6     02-26    TPro  5.1[L]  /  Alb  2.5[L]  /  TBili  1.1  /  DBili  x   /  AST  34  /  ALT  12  /  AlkPhos  46  02-26                        7.8    10.90 )-----------( 151      ( 26 Feb 2025 11:30 )             24.5   lactate .8   BC 2.24 pending.   CMV PVR -ve 2.22   Discussed with CICU team.   Plan:  Cdif and stool PCR.   make CVVH I=O  Increase prograf to 1mg bid  For chest abdo pelvis head CT non con.  Dr Loving to follow  Would ask GI to assess (ileus? )  Marvin Campbell

## 2025-02-26 NOTE — PROGRESS NOTE ADULT - SUBJECTIVE AND OBJECTIVE BOX
ADVANCED HEART FAILURE & TRANSPLANT  - PROGRESS NOTE  *To reach the NS2 Team from 8am to 5pm, please call [phone]   ___________________________________________________________________________  Medications:  aspirin  chewable 81 milliGRAM(s) Oral daily  atorvastatin 10 milliGRAM(s) Oral at bedtime  atovaquone  Suspension 1500 milliGRAM(s) Oral daily  chlorhexidine 2% Cloths 1 Application(s) Topical daily  cholecalciferol 1000 Unit(s) Oral daily  CRRT Treatment    <Continuous>  dexMEDEtomidine Infusion 0.5 MICROgram(s)/kG/Hr IV Continuous <Continuous>  hydrocortisone sodium succinate Injectable 75 milliGRAM(s) IV Push every 8 hours  insulin lispro (ADMELOG) corrective regimen sliding scale   SubCutaneous every 6 hours  nystatin    Suspension 303629 Unit(s) Oral four times a day  pantoprazole  Injectable 40 milliGRAM(s) IV Push two times a day  Phoxillum Filtration BK 4 / 2.5 5000 milliLiter(s) CRRT <Continuous>  Phoxillum Filtration BK 4 / 2.5 5000 milliLiter(s) CRRT <Continuous>  piperacillin/tazobactam IVPB.. 4.5 Gram(s) IV Intermittent every 8 hours  polyethylene glycol 3350 17 Gram(s) Oral daily  PrismaSATE Dialysate BGK 4 / 2.5 5000 milliLiter(s) CRRT <Continuous>  senna 2 Tablet(s) Oral daily  tacrolimus    0.5 mG/mL Suspension 0.8 milliGRAM(s) Oral <User Schedule>  valGANciclovir 50 mG/mL Oral Solution 450 milliGRAM(s) Oral <User Schedule>  vasopressin Infusion 0.04 Unit(s)/Min IV Continuous <Continuous>      Physical Exam:    Vitals:  Vital Signs Last 24 Hours  T(C): 36.9 (02-26-25 @ 07:00), Max: 37.1 (02-25-25 @ 15:00)  HR: 88 (02-26-25 @ 09:00) (88 - 135)  BP: 103/55 (02-26-25 @ 09:00) (82/48 - 161/74)  RR: 26 (02-26-25 @ 09:00) (12 - 38)  SpO2: 98% (02-26-25 @ 09:00) (79% - 100%)    I&O's Summary    25 Feb 2025 07:01  -  26 Feb 2025 07:00  --------------------------------------------------------  IN: 316.9 mL / OUT: 318 mL / NET: -1.1 mL    26 Feb 2025 07:01  -  26 Feb 2025 09:17  --------------------------------------------------------  IN: 170.8 mL / OUT: 176 mL / NET: -5.2 mL    Labs:                        7.6    11.28 )-----------( 150      ( 26 Feb 2025 06:39 )             24.4     02-26    134[L]  |  103  |  24[H]  ----------------------------<  118[H]  4.9   |  22  |  1.86[H]    Ca    8.5      26 Feb 2025 06:39  Phos  3.0     02-26  Mg     2.6     02-26    TPro  5.1[L]  /  Alb  2.5[L]  /  TBili  1.1  /  DBili  x   /  AST  34  /  ALT  12  /  AlkPhos  46  02-26    PT/INR - ( 25 Feb 2025 00:22 )   PT: 11.1 sec;   INR: 0.97 ratio      PTT - ( 25 Feb 2025 00:22 )  PTT:29.7 sec    Lactate Dehydrogenase, Serum: 322 U/L (02-25 @ 11:56)  Lactate Dehydrogenase, Serum: 327 U/L (02-24 @ 17:31)

## 2025-02-26 NOTE — DIETITIAN INITIAL EVALUATION ADULT - ENTERAL
If enteral route able to be utilized for nutrition and tube feeds warranted, recommend trickle feeds of Vital 1.5 @ 10ml/hr x 24hr and when medically ready for full feeds increase by 10ml q8hr to goal rate Vital 1.5 @ 65ml/hr x 24hr to provide 1560ml formula, 2340kcal, 105g protein, 1192ml free water, meets 30.3kcal/kg and 1.36g/kg protein based on lowest daily weight 77.2kg. Defer free water flushes to medical team.

## 2025-02-26 NOTE — DIETITIAN INITIAL EVALUATION ADULT - PROBLEM SELECTOR PLAN 4
Known hx of CKD 3  -  Scr ranged from 1.3-2.1 in 2023. Most recent Scr was 1.75 on 1/30/24. On admission, Scr was 1.9 and remains stable at 1.8. UA showed trace ketones.   > Hold home ARB for now   > Euvolemic - will hold home Bumex 0.5mg

## 2025-02-27 NOTE — PROGRESS NOTE ADULT - SUBJECTIVE AND OBJECTIVE BOX
PATIENT: BILLY RUSSELL, MRN: [id number]    CHIEF COMPLAINT: Patient is a 74y old  Male who presents with a chief complaint of Lethargy (27 Feb 2025 08:31)      INTERVAL HISTORY/OVERNIGHT EVENTS: No overnight events. Denies abdominal pain, chest pain or SOB, cough. Has been ambulating with assistance. Oriented to person, place, and time. Breathing comfortably on room air.    REVIEW OF SYSTEMS:    Constitutional:     [ ] negative [ ] fevers [ ] chills [ ] weight loss [ ] weight gain  HEENT:                  [ ] negative [ ] dry eyes [ ] eye irritation [ ] postnasal drip [ ] nasal congestion  CV:                         [ ] negative  [ ] chest pain [ ] orthopnea [ ] palpitations [ ] murmur  Resp:                     [ ] negative [ ] cough [ ] shortness of breath [ ] dyspnea [ ] wheezing [ ] sputum [ ] hemoptysis  GI:                          [ ] negative [ ] nausea [ ] vomiting [ ] diarrhea [ ] constipation [ ] abd pain [ ] dysphagia   :                        [ ] negative [ ] dysuria [ ] nocturia [ ] hematuria [ ] increased urinary frequency  Musculoskeletal: [ ] negative [ ] back pain [ ] myalgias [ ] arthralgias [ ] fracture  Skin:                       [ ] negative [ ] rash [ ] itch  Neurological:        [ ] negative [ ] headache [ ] dizziness [ ] syncope [ ] weakness [ ] numbness  Psychiatric:           [ ] negative [ ] anxiety [ ] depression  Endocrine:            [ ] negative [ ] diabetes [ ] thyroid problem  Heme/Lymph:      [ ] negative [ ] anemia [ ] bleeding problem  Allergic/Immune: [ ] negative [ ] itchy eyes [ ] nasal discharge [ ] hives [ ] angioedema    [ ] All other systems negative  [ ] Unable to assess ROS because ________.    MEDICATIONS:  MEDICATIONS  (STANDING):  aspirin  chewable 81 milliGRAM(s) Oral daily  atorvastatin 10 milliGRAM(s) Oral at bedtime  atovaquone  Suspension 1500 milliGRAM(s) Oral daily  chlorhexidine 2% Cloths 1 Application(s) Topical daily  cholecalciferol 1000 Unit(s) Oral daily  CRRT Treatment    <Continuous>  dexMEDEtomidine Infusion 0.5 MICROgram(s)/kG/Hr (11.3 mL/Hr) IV Continuous <Continuous>  hydrocortisone sodium succinate Injectable 75 milliGRAM(s) IV Push every 8 hours  insulin lispro (ADMELOG) corrective regimen sliding scale   SubCutaneous every 6 hours  nystatin    Suspension 905734 Unit(s) Oral four times a day  pantoprazole  Injectable 40 milliGRAM(s) IV Push two times a day  Phoxillum Filtration BK 4 / 2.5 5000 milliLiter(s) (1000 mL/Hr) CRRT <Continuous>  Phoxillum Filtration BK 4 / 2.5 5000 milliLiter(s) (200 mL/Hr) CRRT <Continuous>  piperacillin/tazobactam IVPB.. 4.5 Gram(s) IV Intermittent every 8 hours  polyethylene glycol 3350 17 Gram(s) Oral daily  PrismaSATE Dialysate BGK 4 / 2.5 5000 milliLiter(s) (1200 mL/Hr) CRRT <Continuous>  senna 2 Tablet(s) Oral daily  tacrolimus    0.5 mG/mL Suspension 1 milliGRAM(s) Oral <User Schedule>  valGANciclovir 50 mG/mL Oral Solution 450 milliGRAM(s) Oral <User Schedule>    MEDICATIONS  (PRN):      ALLERGIES: Allergies    No Known Allergies    Intolerances        OBJECTIVE:  ICU Vital Signs Last 24 Hrs  T(C): 36.9 (27 Feb 2025 08:00), Max: 37.1 (26 Feb 2025 23:00)  T(F): 98.5 (27 Feb 2025 08:00), Max: 98.8 (26 Feb 2025 23:00)  HR: 92 (27 Feb 2025 08:00) (85 - 110)  BP: 154/67 (27 Feb 2025 08:00) (95/54 - 160/68)  BP(mean): 96 (27 Feb 2025 08:00) (68 - 100)  ABP: --  ABP(mean): --  RR: 18 (27 Feb 2025 08:00) (11 - 32)  SpO2: 94% (27 Feb 2025 08:00) (94% - 100%)    O2 Parameters below as of 27 Feb 2025 08:00  Patient On (Oxygen Delivery Method): room air            Adult Advanced Hemodynamics Last 24 Hrs  CVP(mm Hg): --  CVP(cm H2O): --  CO: --  CI: --  PA: --  PA(mean): --  PCWP: --  SVR: --  SVRI: --  PVR: --  PVRI: --  CAPILLARY BLOOD GLUCOSE      POCT Blood Glucose.: 150 mg/dL (27 Feb 2025 05:01)  POCT Blood Glucose.: 127 mg/dL (27 Feb 2025 00:20)  POCT Blood Glucose.: 138 mg/dL (26 Feb 2025 18:18)  POCT Blood Glucose.: 89 mg/dL (26 Feb 2025 17:08)  POCT Blood Glucose.: 126 mg/dL (26 Feb 2025 11:13)    CAPILLARY BLOOD GLUCOSE      POCT Blood Glucose.: 150 mg/dL (27 Feb 2025 05:01)    I&O's Summary    26 Feb 2025 07:01  -  27 Feb 2025 07:00  --------------------------------------------------------  IN: 1000.8 mL / OUT: 1060 mL / NET: -59.2 mL    27 Feb 2025 07:01  -  27 Feb 2025 08:53  --------------------------------------------------------  IN: 121.6 mL / OUT: 60 mL / NET: 61.6 mL      Daily     Daily     PHYSICAL EXAMINATION:  General: Comfortable, no acute distress, cooperative with exam.  HEENT: Moist mucous membranes.  Respiratory: CTAB, normal respiratory effort, no coughing, wheezes, crackles, or rales.  CV: RRR, S1S2, no murmurs, rubs or gallops. No JVD. Distal pulses intact.  Abdominal: Soft, nontender, nondistended, no rebound or guarding, normal bowel sounds.  Neurology: AOx3, no focal neuro defects, RUVALCABA x 4.  Extremities: No pitting edema, + Peripheral pulses.  Cath site:   Tubes:    LABS:                          7.5    12.24 )-----------( 155      ( 27 Feb 2025 00:47 )             24.0     02-27    136  |  102  |  22  ----------------------------<  150[H]  4.9   |  21[L]  |  1.62[H]    Ca    7.7[L]      27 Feb 2025 06:42  Phos  3.4     02-27  Mg     2.5     02-27    TPro  5.2[L]  /  Alb  2.4[L]  /  TBili  1.2  /  DBili  x   /  AST  33  /  ALT  12  /  AlkPhos  51  02-27    LIVER FUNCTIONS - ( 27 Feb 2025 06:42 )  Alb: 2.4 g/dL / Pro: 5.2 g/dL / ALK PHOS: 51 U/L / ALT: 12 U/L / AST: 33 U/L / GGT: x                   Urinalysis Basic - ( 27 Feb 2025 06:42 )    Color: x / Appearance: x / SG: x / pH: x  Gluc: 150 mg/dL / Ketone: x  / Bili: x / Urobili: x   Blood: x / Protein: x / Nitrite: x   Leuk Esterase: x / RBC: x / WBC x   Sq Epi: x / Non Sq Epi: x / Bacteria: x        TELEMETRY:     EKG:     IMAGING:    PATIENT: BILLY RUSSELL, MRN: [id number]    CHIEF COMPLAINT: Patient is a 74y old  Male who presents with a chief complaint of Lethargy (27 Feb 2025 08:31)      INTERVAL HISTORY/OVERNIGHT EVENTS: Pt with increasing agitation overnight, given haldol x1 and fentanyl x1, started on precedex gtt.  Pt went for MR brain imaging.  Breathing comfortably on room air.    REVIEW OF SYSTEMS:    Constitutional:     [ ] negative [ ] fevers [ ] chills [ ] weight loss [ ] weight gain  HEENT:                  [ ] negative [ ] dry eyes [ ] eye irritation [ ] postnasal drip [ ] nasal congestion  CV:                         [ ] negative  [ ] chest pain [ ] orthopnea [ ] palpitations [ ] murmur  Resp:                     [ ] negative [ ] cough [ ] shortness of breath [ ] dyspnea [ ] wheezing [ ] sputum [ ] hemoptysis  GI:                          [ ] negative [ ] nausea [ ] vomiting [ ] diarrhea [ ] constipation [ ] abd pain [ ] dysphagia   :                        [ ] negative [ ] dysuria [ ] nocturia [ ] hematuria [ ] increased urinary frequency  Musculoskeletal: [ ] negative [ ] back pain [ ] myalgias [ ] arthralgias [ ] fracture  Skin:                       [ ] negative [ ] rash [ ] itch  Neurological:        [ ] negative [ ] headache [ ] dizziness [ ] syncope [ ] weakness [ ] numbness  Psychiatric:           [ ] negative [ ] anxiety [ ] depression  Endocrine:            [ ] negative [ ] diabetes [ ] thyroid problem  Heme/Lymph:      [ ] negative [ ] anemia [ ] bleeding problem  Allergic/Immune: [ ] negative [ ] itchy eyes [ ] nasal discharge [ ] hives [ ] angioedema    [ ] All other systems negative  [X] Unable to assess ROS because __AMS___.    MEDICATIONS:  MEDICATIONS  (STANDING):  aspirin  chewable 81 milliGRAM(s) Oral daily  atorvastatin 10 milliGRAM(s) Oral at bedtime  atovaquone  Suspension 1500 milliGRAM(s) Oral daily  chlorhexidine 2% Cloths 1 Application(s) Topical daily  cholecalciferol 1000 Unit(s) Oral daily  CRRT Treatment    <Continuous>  dexMEDEtomidine Infusion 0.5 MICROgram(s)/kG/Hr (11.3 mL/Hr) IV Continuous <Continuous>  hydrocortisone sodium succinate Injectable 75 milliGRAM(s) IV Push every 8 hours  insulin lispro (ADMELOG) corrective regimen sliding scale   SubCutaneous every 6 hours  nystatin    Suspension 066096 Unit(s) Oral four times a day  pantoprazole  Injectable 40 milliGRAM(s) IV Push two times a day  Phoxillum Filtration BK 4 / 2.5 5000 milliLiter(s) (1000 mL/Hr) CRRT <Continuous>  Phoxillum Filtration BK 4 / 2.5 5000 milliLiter(s) (200 mL/Hr) CRRT <Continuous>  piperacillin/tazobactam IVPB.. 4.5 Gram(s) IV Intermittent every 8 hours  polyethylene glycol 3350 17 Gram(s) Oral daily  PrismaSATE Dialysate BGK 4 / 2.5 5000 milliLiter(s) (1200 mL/Hr) CRRT <Continuous>  senna 2 Tablet(s) Oral daily  tacrolimus    0.5 mG/mL Suspension 1 milliGRAM(s) Oral <User Schedule>  valGANciclovir 50 mG/mL Oral Solution 450 milliGRAM(s) Oral <User Schedule>    MEDICATIONS  (PRN):      ALLERGIES: Allergies    No Known Allergies    Intolerances        OBJECTIVE:  ICU Vital Signs Last 24 Hrs  T(C): 36.9 (27 Feb 2025 08:00), Max: 37.1 (26 Feb 2025 23:00)  T(F): 98.5 (27 Feb 2025 08:00), Max: 98.8 (26 Feb 2025 23:00)  HR: 92 (27 Feb 2025 08:00) (85 - 110)  BP: 154/67 (27 Feb 2025 08:00) (95/54 - 160/68)  BP(mean): 96 (27 Feb 2025 08:00) (68 - 100)  ABP: --  ABP(mean): --  RR: 18 (27 Feb 2025 08:00) (11 - 32)  SpO2: 94% (27 Feb 2025 08:00) (94% - 100%)    O2 Parameters below as of 27 Feb 2025 08:00  Patient On (Oxygen Delivery Method): room air            Adult Advanced Hemodynamics Last 24 Hrs  CVP(mm Hg): --  CVP(cm H2O): --  CO: --  CI: --  PA: --  PA(mean): --  PCWP: --  SVR: --  SVRI: --  PVR: --  PVRI: --  CAPILLARY BLOOD GLUCOSE      POCT Blood Glucose.: 150 mg/dL (27 Feb 2025 05:01)  POCT Blood Glucose.: 127 mg/dL (27 Feb 2025 00:20)  POCT Blood Glucose.: 138 mg/dL (26 Feb 2025 18:18)  POCT Blood Glucose.: 89 mg/dL (26 Feb 2025 17:08)  POCT Blood Glucose.: 126 mg/dL (26 Feb 2025 11:13)    CAPILLARY BLOOD GLUCOSE      POCT Blood Glucose.: 150 mg/dL (27 Feb 2025 05:01)    I&O's Summary    26 Feb 2025 07:01  -  27 Feb 2025 07:00  --------------------------------------------------------  IN: 1000.8 mL / OUT: 1060 mL / NET: -59.2 mL    27 Feb 2025 07:01  -  27 Feb 2025 08:53  --------------------------------------------------------  IN: 121.6 mL / OUT: 60 mL / NET: 61.6 mL      Daily     Daily     Constitutional: laying in bed, arousable, agitated, frequently moving arms and head, following few commands  HEENT: NC/AT, PERRLA, EOMI, no conjunctival pallor or scleral icterus, MMM  Neck: Supple, no JVD  Respiratory: CTA B/L. No w/r/r.   Cardiovascular: regular rate, normal S1 and S2, no m/r/g.   Gastrointestinal: mildly distended, +BS, nontender, no guarding or rebound tenderness, no palpable masses  Extremities: RUE edematous; no cyanosis, clubbing or edema. 1 bullae approx. [id number] on R forearm.  Neurological: AAOx1 (self), no CN deficits, strength and sensation intact throughout.   Skin: approx. 3x3cm area of hyperpigmentation and swelling to R parasternal upper chest wall, no fluctuance no open wounds.  LABS:                          7.5    12.24 )-----------( 155      ( 27 Feb 2025 00:47 )             24.0     02-27    136  |  102  |  22  ----------------------------<  150[H]  4.9   |  21[L]  |  1.62[H]    Ca    7.7[L]      27 Feb 2025 06:42  Phos  3.4     02-27  Mg     2.5     02-27    TPro  5.2[L]  /  Alb  2.4[L]  /  TBili  1.2  /  DBili  x   /  AST  33  /  ALT  12  /  AlkPhos  51  02-27    LIVER FUNCTIONS - ( 27 Feb 2025 06:42 )  Alb: 2.4 g/dL / Pro: 5.2 g/dL / ALK PHOS: 51 U/L / ALT: 12 U/L / AST: 33 U/L / GGT: x                   Urinalysis Basic - ( 27 Feb 2025 06:42 )    Color: x / Appearance: x / SG: x / pH: x  Gluc: 150 mg/dL / Ketone: x  / Bili: x / Urobili: x   Blood: x / Protein: x / Nitrite: x   Leuk Esterase: x / RBC: x / WBC x   Sq Epi: x / Non Sq Epi: x / Bacteria: x        TELEMETRY:     EKG:     IMAGING:

## 2025-02-27 NOTE — PROGRESS NOTE ADULT - ASSESSMENT
75yo M w/ pmhx HTN, HLD, nonischemic cardiomyopathy, chronic systolic heart failure s/p HM2 LVAD (6/2017), s/p heart transplant from Hep. 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 Found to have septic shock, (2/19 BCx pseudo, 2/22 BCx NGTD). Still episodes of hypotension. Worsening NORMAN. Accepted to MICU for CRRT.      ====================NEUROLOGY=======================  # Metabolic encephalopathy.  - Likely multi-factorial etiologies: septic vs uremic vs hypercapnic, less likely meningitis  - Patient baseline AOx3. AOx1 at ED arrival, iso of infection vs metabolic derangements (uremic encephelopathy)  - CTH and angio w/o hemorrhage or stenosis   - TSH wnl / B12, CK 2200  - Fall precautions  - spot EEG 2/21: negative for seizures   - Currently A&O xO    Plan  - Monitor MS w/ abx and CRRT  - Neurology consulted     ====================RESPIRATORY======================  #Metabolic acidosis  - respiratory and metabolic acidosis VpH 7.17, PCO2 57 on admission  - pH improving, likely as respiratory component has improved  - Remains on room air      ====================CARDIOVASCULAR==================  #Septic shock from pseudomonas bacteremia  - POCUS reveals no evidence of  fluid overload  - Pt is also likely intravascular depleted with ileus and poor po intake.   - S/p IVF  - Patient volume up hold off IVF, now running net even on CRRT  - Pressors: s/p levo, off 2/24    Plan  - F/u transplant ID recs - CMV ordered    # H/O heart transplant.   - Nonischemic cardiomyopathy, chronic systolic heart failure s/p HM2 LVAD (6/2017), s/p heart transplant from Hep. C donor (treated) 2/23/18   - Home Tacrolimus dose 2mg BID, Pred 15mg qd  - Intolerant of Cellcept due to leukopenia.  - Continue with home Pred 15mg qd  - Hold home Bactrim given hyperkalemia, now on Atovaquone for ppx  - ASA  - c/w home Atorvastatin (therapeutic interchange)   - HOLD home Metoprolol from 200mg with hold parameters due to hypotension (02/23)  - Tacro level daily : home regimen 2mg BID, c/w 0.8mg BID-> will adjust by level today     # Paroxysmal atrial fibrillation.   - Home regimen : Eliquis 5 mg PO BID at home for hx of of afib and IJ thrombi  - EKG on admission - nsr   - [id number]  = 3  - Last Dose : 2/20 AM   - Currently on HSQ    # Hypertension (chronic)  - Hold home Toprol  mg PO QD ISO hypotension  - Hold home Losartan 75 mg PO QD due to NORMAN & hypotension.    # Vascular disorder of lower extremity.   - Extremities appear cool and dry   - c/w Local wound care by podiatry   - Podiatry recommendations - pending SHANEKA studies, consider vascular evaluation if abnormal.    ====================/RENAL========================  NORMAN on CKD  ATN 2/2 septic shock  - Known hx of CKD, Cr now uptrending  - Scr ranged from 1.3-2.1 in 2023. Most recent Scr was 1.75 on 1/30/24. On admission, Scr was 1.9 and is worsening now. UA showed trace ketones.   - No improvement with IVF, likely ATN i/s/o contrast/medications.   - S/p double lumen L fem catheter  - CRRT net even  - c/w renvela for hyperphosphatemia  - CRRT as per nephro    #Hyperkalemia (resolved)   - K 6.2 at ED arrival  - shifted in the ED, no EKG changes consistent with severe hyper K. Cr not significantly elevated form baseline, c/f RTA from bactrim and Tacro. Stable   - s/p Lokelma 10mg BID for 3 days  - f/u Tacro level daily   - Hold home bactrim - ID consult regarding Atovaquone   - c/w monitoring patient on Tele.  - CRRT as above      ====================GI/NUTRITION=====================  #Ileus  #+/- coffee ground emesis  - Ileus could potentially be source of infx  - 1 episode of coffee ground emesis, unclear if true GIB  - Hb stable   - CTAP (2/22) ileus vs partial SBO - cannot r/o GI bleed  - NGT placed set to LIS  - monitor for further episodes  - c/w PPI IV 40 BID   - c/w trending CBC Q12H  - bowel regimen: senna, miralax  - Restart DVT Ppx/ASA for now; if no more GIB and HgB stable  - if repeat episode, GI consult   - If repeat episode or worsening lactate, CT angiogram with venous phase.    ====================SKIN=============================  #Swollen arm   - RUE swollen with hx of IJ thrombus, possible infiltrated IV. LUE now swollen. + Pulses, and no motor deficits   - lower concern for compartment syndrome  - RUE Duplex without thrombus   - c/w derm recs regarding bilateral bullae on upper extremities (Likely edema bullae; please see Derm note  - Elevate R arm, warm compresses and arm precautions.    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 No manifest signs of bowel ischemia. Interval worsening of right lower lobe and right middle lobe atelectasis secondary to elevated right hemidiaphragm when compared to prior CT dated 7/11/2023. Superimposed infection in the collapsed lungs is not excluded  - s/p meropenem (2/21-2/24)  - Hold off additional doses of Vancomycin (MRSA swab negative)  - c/w home Valganciclovir for ppx renally dosed   - f/u infectious labs - Toxoplasma, EBV, Fungitell, Galactomannan, CMV, Cryptococcus, MRSA, ASCENCION virus, CMV, Crypto, ASCENCION virus  - f/u GI PCR, consider C. Diff testing   - Transplant ID recommendations   - Consider LP if MS not improving  - Warm blanket    ====================ENDOCRINE=======================  # Diabetes type 2.   - A1c 5.8  - ISS    ====================HEMATOLOGIC/DVT PPx=============  # Hx of hemolytic anemia  - hx of hemolytic anemia, follows with Dr. Rosario as outpatient  - c/w Prednisone 15 mg PO QD   - f/u hematology/oncology recs  - Monitor H&H daily.  - f/u hemolysis labs and peripheral smear    # DVT ppx  - restart heparin sq  - SCD    ====================ETHICS===========================  full code.    ======================= DISPOSITION  =====================  [X] Critical   [ ] Guarded    [ ] Stable    [X] Maintain in CICU  [ ] Downgrade to Telemetry  [ ] Discharge Home

## 2025-02-27 NOTE — PROGRESS NOTE ADULT - ASSESSMENT
72yo M pmhx HTN, HLD, CKD, nonischemic cardiomyopathy, chronic systolic heart failure s/p HM2 LVAD (6/2017), s/p heart transplant from Hep. 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 Superimposed infection in the collapsed lungs is not excluded.    CT abdomen pelvis (February 22) Since prior study 2/22/2025 interval development of diffusely dilated small bowel loops with air-fluid levels. Contrast from prior study has passed into the colon. Findings are favored to represent ileus or less likely partial small bowel obstruction rather than mechanical obstruction. No manifest signs of bowel ischemia.    At this point suspect that Pseudomonas bacteremia was secondary to either a respiratory source or gastrointestinal translocation.  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    CT chest/abdomen/pelvis 2/26    Bronchial mucus plugging with right lower lobe atelectasis.  No CT evidence of occult abscess in the abdomen or pelvis.      Antimicrobials   Zosyn 2/19/ 2/20  Meropenem 2/20-2/24  Zosyn 2/25-    #Pseudomonas Bacteremia, Encephalopathy, Heart Transplant Recipient, Prophylactic Antibiotic  # CKD with NORMAN on CRRT  --Continue Zosyn 3.375g IV Q8H  --Continue Atovaquone 1,500 mg PO Q24H for PCP PPx  -- f/u MRI chest to evaluate nodular lesion chest  -- Encephalopathy associated with uremia  --Continue Valcyte 450 mg M/W/F for CMV PPx  --Continue to follow CBC with diff  --Continue to follow transaminases  --Continue to follow temperature curve  --Follow up on preliminary blood cultures        All recommendations are tentative pending Attending Attestation.    Ignacio Sepulveda MD, PGY-5  ID Fellow  [employer] Teams Preferred  After 5pm/weekends call [phone]     72yo M pmhx HTN, HLD, CKD, nonischemic cardiomyopathy, chronic systolic heart failure s/p HM2 LVAD (6/2017), s/p heart transplant from Hep. 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 Superimposed infection in the collapsed lungs is not excluded.    CT abdomen pelvis (February 22) Since prior study 2/22/2025 interval development of diffusely dilated small bowel loops with air-fluid levels. Contrast from prior study has passed into the colon. Findings are favored to represent ileus or less likely partial small bowel obstruction rather than mechanical obstruction. No manifest signs of bowel ischemia.    At this point suspect that Pseudomonas bacteremia was secondary to either a respiratory source or gastrointestinal translocation.  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    CT chest/abdomen/pelvis 2/26    Bronchial mucus plugging with right lower lobe atelectasis.  No CT evidence of occult abscess in the abdomen or pelvis.      Antimicrobials   Zosyn 2/19/ 2/20  Meropenem 2/20-2/24  Zosyn 2/25-    #Pseudomonas Bacteremia, Encephalopathy, Heart Transplant Recipient, Prophylactic Antibiotic  # CKD with NORMAN on CRRT  --Continue Zosyn 3.375g IV Q8H  --Continue Atovaquone 1,500 mg PO Q24H for PCP PPx  -- f/u MRI chest to evaluate nodular lesion chest  -- Encephalopathy associated with uremia  --Continue Valcyte 450 mg M/W/F for CMV PPx  --Continue to follow CBC with diff  --Continue to follow transaminases  --Continue to follow temperature curve  --Follow up on preliminary blood cultures        Discussed with attending    Ignacio Sepulveda MD, PGY-5  ID Fellow  [employer] Teams Preferred  After 5pm/weekends call [phone]     73 year old male PMH HTN, HLD, CKD, nonischemic cardiomyopathy, chronic systolic heart failure s/p HM2 LVAD (6/2017), s/p heart transplant from Hep. 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 Superimposed infection in the collapsed lungs is not excluded.    CT abdomen pelvis (February 22) Since prior study 2/22/2025 interval development of diffusely dilated small bowel loops with air-fluid levels. Contrast from prior study has passed into the colon. Findings are favored to represent ileus or less likely partial small bowel obstruction rather than mechanical obstruction. No manifest signs of bowel ischemia.    At this point suspect that Pseudomonas bacteremia was secondary to either a respiratory source or gastrointestinal translocation.  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    CT chest/abdomen/pelvis 2/26    Bronchial mucus plugging with right lower lobe atelectasis.  No CT evidence of occult abscess in the abdomen or pelvis.      Antimicrobials   Zosyn 2/19/ 2/20  Meropenem 2/20-2/24  Zosyn 2/25-    #Pseudomonas Bacteremia, Encephalopathy, Heart Transplant Recipient, Prophylactic Antibiotic  # CKD with NORMAN on CRRT  --Continue Zosyn 3.375g IV Q8H  --Continue Atovaquone 1,500 mg PO Q24H for PCP PPx  -- f/u MRI chest to evaluate nodular lesion chest  --If encephalopathy persists and MRI brain findings not thought to be contributing to encephalopathy would recommend pursuing LP  --Continue Valcyte 450 mg M/W/F for CMV PPx  --Continue to follow CBC with diff  --Continue to follow transaminases  --Continue to follow temperature curve  --Follow up on preliminary blood cultures        Discussed with attending    Ignacio Sepulveda MD, PGY-5  ID Fellow  [employer] Teams Preferred  After 5pm/weekends call [phone]

## 2025-02-27 NOTE — PROGRESS NOTE ADULT - SUBJECTIVE AND OBJECTIVE BOX
ADVANCED HEART FAILURE & TRANSPLANT  - PROGRESS NOTE  *To reach the NS2 Team from 8am to 5pm (MON-FRI), please call [phone].   _______________________________________________________________________________________________________    Subjective:    Medications:  aspirin  chewable 81 milliGRAM(s) Oral daily  atorvastatin 10 milliGRAM(s) Oral at bedtime  atovaquone  Suspension 1500 milliGRAM(s) Oral daily  chlorhexidine 2% Cloths 1 Application(s) Topical daily  cholecalciferol 1000 Unit(s) Oral daily  CRRT Treatment    <Continuous>  dexMEDEtomidine Infusion 0.5 MICROgram(s)/kG/Hr IV Continuous <Continuous>  hydrocortisone sodium succinate Injectable 75 milliGRAM(s) IV Push every 8 hours  insulin lispro (ADMELOG) corrective regimen sliding scale   SubCutaneous every 6 hours  nystatin    Suspension 427303 Unit(s) Oral four times a day  pantoprazole  Injectable 40 milliGRAM(s) IV Push two times a day  Phoxillum Filtration BK 4 / 2.5 5000 milliLiter(s) CRRT <Continuous>  Phoxillum Filtration BK 4 / 2.5 5000 milliLiter(s) CRRT <Continuous>  piperacillin/tazobactam IVPB.. 4.5 Gram(s) IV Intermittent every 8 hours  polyethylene glycol 3350 17 Gram(s) Oral daily  PrismaSATE Dialysate BGK 4 / 2.5 5000 milliLiter(s) CRRT <Continuous>  senna 2 Tablet(s) Oral daily  tacrolimus    0.5 mG/mL Suspension 1 milliGRAM(s) Oral <User Schedule>  valGANciclovir 50 mG/mL Oral Solution 450 milliGRAM(s) Oral <User Schedule>      Physical Exam:    Vitals:  Vital Signs Last 24 Hours  T(C): 36.9 (02-27-25 @ 08:00), Max: 37.1 (02-26-25 @ 23:00)  HR: 92 (02-27-25 @ 08:00) (85 - 110)  BP: 154/67 (02-27-25 @ 08:00) (95/54 - 160/68)  RR: 18 (02-27-25 @ 08:00) (11 - 32)  SpO2: 94% (02-27-25 @ 08:00) (94% - 100%)        I&O's Summary    26 Feb 2025 07:01  -  27 Feb 2025 07:00  --------------------------------------------------------  IN: 1000.8 mL / OUT: 1060 mL / NET: -59.2 mL    27 Feb 2025 07:01  -  27 Feb 2025 08:31  --------------------------------------------------------  IN: 121.6 mL / OUT: 60 mL / NET: 61.6 mL      Labs:                        7.5    12.24 )-----------( 155      ( 27 Feb 2025 00:47 )             24.0     02-27    136  |  102  |  22  ----------------------------<  150[H]  4.9   |  21[L]  |  1.62[H]    Ca    7.7[L]      27 Feb 2025 06:42  Phos  3.4     02-27  Mg     2.5     02-27    TPro  5.2[L]  /  Alb  2.4[L]  /  TBili  1.2  /  DBili  x   /  AST  33  /  ALT  12  /  AlkPhos  51  02-27              Lactate Dehydrogenase, Serum: 322 U/L (02-25 @ 11:56)  Lactate Dehydrogenase, Serum: 327 U/L (02-24 @ 17:31)

## 2025-02-27 NOTE — PROGRESS NOTE ADULT - SUBJECTIVE AND OBJECTIVE BOX
Admission date:  CHIEF COMPLAINT:  HPI:  75yo M pmhx HTN, HLD, nonischemic cardiomyopathy, chronic systolic heart failure s/p HM2 LVAD (6/2017), s/p heart transplant from Hep. 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 On the phone, the patient's godbrother mentioned that on 2/18, the patient was in the car with his godbrother and was disoriented asking to get off on the road 4 blocks before his house. In addition, a caretaker stated that when she spoke to Mr. Hameed on the phone at 11am on 2/18, he was doing well. However, when the caretaker visited 2 hours later at 1pm, the patient was disoriented and felt his legs were heavy. This prompted the patient to come to the hospital.     In the ED: Hypothermic 91.4, HR 80, /60, RA   Patient noted to be Hyperkalemic (6.2) with Mixed respiratory and metabolic acidosis pH 7.2. - Patient was given Calcium Gluconate 2g, D50/Insulin 5 unit, 40 mg IV lasix, Lokelma 10mg.   Vancomycin and Zosyn  (20 Feb 2025 07:21)    INTERVAL HISTORY:    REVIEW OF SYSTEMS: Denies xxxxxx; all others negative    MEDICATIONS  (STANDING):  aspirin  chewable 81 milliGRAM(s) Oral daily  atorvastatin 10 milliGRAM(s) Oral at bedtime  atovaquone  Suspension 1500 milliGRAM(s) Oral daily  chlorhexidine 2% Cloths 1 Application(s) Topical daily  cholecalciferol 1000 Unit(s) Oral daily  CRRT Treatment    <Continuous>  dexMEDEtomidine Infusion 0.5 MICROgram(s)/kG/Hr (11.3 mL/Hr) IV Continuous <Continuous>  heparin   Injectable 5000 Unit(s) SubCutaneous every 8 hours  hydrocortisone sodium succinate Injectable 75 milliGRAM(s) IV Push every 8 hours  insulin lispro (ADMELOG) corrective regimen sliding scale   SubCutaneous every 6 hours  nystatin    Suspension 187784 Unit(s) Oral four times a day  pantoprazole  Injectable 40 milliGRAM(s) IV Push two times a day  Phoxillum Filtration BK 4 / 2.5 5000 milliLiter(s) (1000 mL/Hr) CRRT <Continuous>  piperacillin/tazobactam IVPB.. 4.5 Gram(s) IV Intermittent every 8 hours  polyethylene glycol 3350 17 Gram(s) Oral daily  PrismaSATE Dialysate BGK 4 / 2.5 5000 milliLiter(s) (1200 mL/Hr) CRRT <Continuous>  PrismaSOL Filtration BGK 0 / 2.5 5000 milliLiter(s) (200 mL/Hr) CRRT <Continuous>  senna 2 Tablet(s) Oral daily  tacrolimus    0.5 mG/mL Suspension 1.75 milliGRAM(s) Oral <User Schedule>  valGANciclovir 50 mG/mL Oral Solution 450 milliGRAM(s) Oral <User Schedule>    MEDICATIONS  (PRN):      Objective:  ICU Vital Signs Last 24 Hrs  T(C): 37.1 (27 Feb 2025 19:00), Max: 37.2 (27 Feb 2025 15:00)  T(F): 98.7 (27 Feb 2025 19:00), Max: 98.9 (27 Feb 2025 15:00)  HR: 140 (27 Feb 2025 22:00) (85 - 141)  BP: 153/65 (27 Feb 2025 22:00) (112/66 - 160/68)  BP(mean): 93 (27 Feb 2025 22:00) (82 - 101)  ABP: --  ABP(mean): --  RR: 30 (27 Feb 2025 22:00) (11 - 30)  SpO2: 97% (27 Feb 2025 22:00) (92% - 100%)    O2 Parameters below as of 27 Feb 2025 22:00  Patient On (Oxygen Delivery Method): room air                02-26 @ 07:01  -  02-27 @ 07:00  --------------------------------------------------------  IN: 1000.8 mL / OUT: 1060 mL / NET: -59.2 mL    02-27 @ 07:01  -  02-27 @ 22:43  --------------------------------------------------------  IN: 876.7 mL / OUT: 815 mL / NET: 61.7 mL      Daily     Daily     PHYSICAL EXAM:      Constitutional:    HEENT:    Respiratory:    Cardiovascular:  Access site:    Gastrointestinal:    Genitourinary:    Extremities:    Vascular:    Neurological:    Skin:      TELEMETRY:     EKG:     IMAGING:    Labs:                          7.5    12.24 )-----------( 155      ( 27 Feb 2025 00:47 )             24.0     02-27    137  |  102  |  21  ----------------------------<  123[H]  4.9   |  22  |  1.63[H]    Ca    7.9[L]      27 Feb 2025 17:23  Phos  3.1     02-27  Mg     2.6     02-27    TPro  5.4[L]  /  Alb  2.6[L]  /  TBili  1.2  /  DBili  x   /  AST  30  /  ALT  13  /  AlkPhos  54  02-27    LIVER FUNCTIONS - ( 27 Feb 2025 17:23 )  Alb: 2.6 g/dL / Pro: 5.4 g/dL / ALK PHOS: 54 U/L / ALT: 13 U/L / AST: 30 U/L / GGT: x               Urinalysis Basic - ( 27 Feb 2025 17:23 )    Color: x / Appearance: x / SG: x / pH: x  Gluc: 123 mg/dL / Ketone: x  / Bili: x / Urobili: x   Blood: x / Protein: x / Nitrite: x   Leuk Esterase: x / RBC: x / WBC x   Sq Epi: x / Non Sq Epi: x / Bacteria: x        HEALTH ISSUES - PROBLEM Dx:  Acute respiratory acidosis    Hyperkalemia    Stage 3 chronic kidney disease    Metabolic encephalopathy    Systemic inflammatory response syndrome (SIRS)    Need for prophylactic measure    H/O heart transplant    AMS (altered mental status)    NORMAN (acute kidney injury)    Status post heart transplant    Immunosuppression    Prophylactic antibiotic    Deep vein thrombosis (DVT)    Hypertension    DM (diabetes mellitus)    Anemia, hemolytic    Swollen arm    Hemolytic anemia    Paroxysmal atrial fibrillation    Diabetes type 2    Acute kidney injury superimposed on CKD    Vascular disorder of lower extremity    Coffee ground emesis          ====================ASSESSMENT ==============  75yo M w/ pmhx HTN, HLD, nonischemic cardiomyopathy, chronic systolic heart failure s/p HM2 LVAD (6/2017), s/p heart transplant from Hep. 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 Found to have septic shock, (2/19 BCx pseudo, 2/22 BCx NGTD). Still episodes of hypotension. Worsening NORMAN. Accepted to MICU for CRRT.    Plan:  ====================== NEUROLOGY=====================  # Metabolic encephalopathy.  - Likely multi-factorial etiologies: septic vs uremic vs hypercapnic, less likely meningitis  - Patient baseline AOx3. AOx1 at ED arrival, iso of infection vs metabolic derangements (uremic encephelopathy)  - CTH and angio w/o hemorrhage or stenosis   - TSH wnl / B12, CK 2200  - Fall precautions  - spot EEG 2/21: negative for seizures   - Currently A&O xO    Plan  - Monitor MS w/ abx and CRRT  - Neurology consulted     dexMEDEtomidine Infusion 0.5 MICROgram(s)/kG/Hr IV Continuous <Continuous>  fentaNYL    Injectable 25 MICROGram(s) IV Push once    ==================== RESPIRATORY======================  #Metabolic acidosis  - respiratory and metabolic acidosis VpH 7.17, PCO2 57 on admission  - pH improving, likely as respiratory component has improved  - Remains on room air    ====================CARDIOVASCULAR==================  #Septic shock from pseudomonas bacteremia  - POCUS reveals no evidence of  fluid overload  - Pt is also likely intravascular depleted with ileus and poor po intake.   - S/p IVF  - Patient volume up hold off IVF, now running net even on CRRT  - Pressors: s/p levo, off 2/24    Plan  - F/u transplant ID recs - CMV ordered    # H/O heart transplant.   - Nonischemic cardiomyopathy, chronic systolic heart failure s/p HM2 LVAD (6/2017), s/p heart transplant from Hep. C donor (treated) 2/23/18   - Home Tacrolimus dose 2mg BID, Pred 15mg qd  - Intolerant of Cellcept due to leukopenia.  - Continue with home Pred 15mg qd  - Hold home Bactrim given hyperkalemia, now on Atovaquone for ppx  - ASA  - c/w home Atorvastatin (therapeutic interchange)   - HOLD home Metoprolol from 200mg with hold parameters due to hypotension (02/23)  - Tacro level daily : home regimen 2mg BID, c/w 0.8mg BID-> will adjust by level today     # Paroxysmal atrial fibrillation.   - Home regimen : Eliquis 5 mg PO BID at home for hx of of afib and IJ thrombi  - EKG on admission - nsr   - [id number]  = 3  - Last Dose : 2/20 AM   - Currently on HSQ    # Hypertension (chronic)  - Hold home Toprol  mg PO QD ISO hypotension  - Hold home Losartan 75 mg PO QD due to NORMAN & hypotension.    # Vascular disorder of lower extremity.   - Extremities appear cool and dry   - c/w Local wound care by podiatry   - Podiatry recommendations - pending SHANEKA studies, consider vascular evaluation if abnormal.    ===================HEMATOLOGIC/ONC ===================  # Hx of hemolytic anemia  - hx of hemolytic anemia, follows with Dr. Rosario as outpatient  - c/w Prednisone 15 mg PO QD   - f/u hematology/oncology recs  - Monitor H&H daily.  - f/u hemolysis labs and peripheral smear    # DVT ppx  - restart heparin sq  - SCD    aspirin  chewable 81 milliGRAM(s) Oral daily    ===================== RENAL =========================  NORMAN on CKD  ATN 2/2 septic shock  - Known hx of CKD, Cr now uptrending  - Scr ranged from 1.3-2.1 in 2023. Most recent Scr was 1.75 on 1/30/24. On admission, Scr was 1.9 and is worsening now. UA showed trace ketones.   - No improvement with IVF, likely ATN i/s/o contrast/medications.   - S/p double lumen L fem catheter  - CRRT net even  - c/w renvela for hyperphosphatemia  - CRRT as per nephro    #Hyperkalemia (resolved)   - K 6.2 at ED arrival  - shifted in the ED, no EKG changes consistent with severe hyper K. Cr not significantly elevated form baseline, c/f RTA from bactrim and Tacro. Stable   - s/p Lokelma 10mg BID for 3 days  - f/u Tacro level daily   - Hold home bactrim - ID consult regarding Atovaquone   - c/w monitoring patient on Tele.  - CRRT as above    ==================== GASTROINTESTINAL===================  #Ileus  #+/- coffee ground emesis  - Ileus could potentially be source of infx  - 1 episode of coffee ground emesis, unclear if true GIB  - Hb stable   - CTAP (2/22) ileus vs partial SBO - cannot r/o GI bleed  - NGT placed set to LIS  - monitor for further episodes  - c/w PPI IV 40 BID   - c/w trending CBC Q12H  - bowel regimen: senna, miralax  - Restart DVT Ppx/ASA for now; if no more GIB and HgB stable  - if repeat episode, GI consult   - If repeat episode or worsening lactate, CT angiogram with venous phase.    pantoprazole  Injectable 40 milliGRAM(s) IV Push two times a day  polyethylene glycol 3350 17 Gram(s) Oral daily  senna 2 Tablet(s) Oral daily    =======================    ENDOCRINE  =====================  # Diabetes type 2.   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 No manifest signs of bowel ischemia. Interval worsening of right lower lobe and right middle lobe atelectasis secondary to elevated right hemidiaphragm when compared to prior CT dated 7/11/2023. Superimposed infection in the collapsed lungs is not excluded  - s/p meropenem (2/21-2/24)  - Hold off additional doses of Vancomycin (MRSA swab negative)  - c/w home Valganciclovir for ppx renally dosed   - f/u infectious labs - Toxoplasma, EBV, Fungitell, Galactomannan, CMV, Cryptococcus, MRSA, ASCENCION virus, CMV, Crypto, ASCENCION virus  - f/u GI PCR, consider C. Diff testing   - Transplant ID recommendations   - Consider LP if MS not improving  - Warm blanket    tacrolimus    0.5 mG/mL Suspension 1 milliGRAM(s) Oral <User Schedule>    atovaquone  Suspension 1500 milliGRAM(s) Oral daily  nystatin    Suspension 293194 Unit(s) Oral four times a day  piperacillin/tazobactam IVPB.. 4.5 Gram(s) IV Intermittent every 8 hours  valGANciclovir 50 mG/mL Oral Solution 450 milliGRAM(s) Oral <User Schedule>    chlorhexidine 2% Cloths 1 Application(s) Topical daily    Patient requires continuous monitoring with bedside rhythm monitoring, pulse ox monitoring, and intermittent blood gas analysis. Care plan discussed with ICU care team. Patient remained critical and at risk for life threatening decompensation.  Patient seen, examined and plan discussed with CCU team during rounds.     I have personally provided __35__ minutes of critical care time excluding time spent on separate procedures, in addition to initial critical care time provided by the CICU Attending, Dr. Santiago.

## 2025-02-27 NOTE — PROGRESS NOTE ADULT - ASSESSMENT
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 On exam, more lethargic today, on precedex, eyes closed, non verbal not following commands. A1c 5.8, LDL 22. MRI B  Small subacute lacunar infarct within the right cerebellar hemisphere. TTE as above.    Impression: AMS multifactorial causes likely sedative use, toxic, metabolic, infectious etiology, hospital delirium. Small right cerebellar stroke unlikely to be contributing to current mental status     Recommendations:  [] Restart Eliquis when medically able for secondary stroke prevention   [] Taper sedation as tolerated  [] DVT ppx if Eliquis not restarted  [] Continue Lipitor 10 mg daily     Other  [] Neurochecks and vital signs per unit protocol  [] Avoid hypotension    [] BG goal <180, avoid hypoglycemia  [] NPO until clears dysphagia screen, otherwise swallow evaluation      Seen with Dr. Tidwell.

## 2025-02-27 NOTE — PROGRESS NOTE ADULT - SUBJECTIVE AND OBJECTIVE BOX
Neurology - Progress Note    -  Spectra: [phone] ([facility]), [id number] ([facility])  -    Interval History: Patient sedated on precedex.     NIHSS 12 on 2/26  premRS 3    Review of Systems:   Unable to assess     Allergies:  No Known Allergies      PMHx/PSHx/Family Hx: As above, otherwise see below   CHF (Congestive Heart Failure)    HTN    SVT (Supraventricular Tachycardia)    Non-Ischemic Cardiomyopathy    PAF (paroxysmal atrial fibrillation)    Ventricular fibrillation    H/O prior ablation treatment    GIB (gastrointestinal bleeding)    Hepatitis C virus    DVT of upper extremity (deep vein thrombosis)    Former smoker    HLD (hyperlipidemia)    Knee pain, right    H/O autoimmune hemolytic anemia    H/O hemolytic anemia    Atrial fibrillation        Social Hx:  No current use of tobacco, alcohol, or illicit drugs      Medications:  MEDICATIONS  (STANDING):  aspirin  chewable 81 milliGRAM(s) Oral daily  atorvastatin 10 milliGRAM(s) Oral at bedtime  atovaquone  Suspension 1500 milliGRAM(s) Oral daily  chlorhexidine 2% Cloths 1 Application(s) Topical daily  cholecalciferol 1000 Unit(s) Oral daily  CRRT Treatment    <Continuous>  dexMEDEtomidine Infusion 0.5 MICROgram(s)/kG/Hr (11.3 mL/Hr) IV Continuous <Continuous>  heparin   Injectable 5000 Unit(s) SubCutaneous every 8 hours  hydrocortisone sodium succinate Injectable 75 milliGRAM(s) IV Push every 8 hours  insulin lispro (ADMELOG) corrective regimen sliding scale   SubCutaneous every 6 hours  nystatin    Suspension 493944 Unit(s) Oral four times a day  pantoprazole  Injectable 40 milliGRAM(s) IV Push two times a day  Phoxillum Filtration BK 4 / 2.5 5000 milliLiter(s) (1000 mL/Hr) CRRT <Continuous>  Phoxillum Filtration BK 4 / 2.5 5000 milliLiter(s) (200 mL/Hr) CRRT <Continuous>  piperacillin/tazobactam IVPB.. 4.5 Gram(s) IV Intermittent every 8 hours  polyethylene glycol 3350 17 Gram(s) Oral daily  PrismaSATE Dialysate BGK 4 / 2.5 5000 milliLiter(s) (1200 mL/Hr) CRRT <Continuous>  senna 2 Tablet(s) Oral daily  tacrolimus    0.5 mG/mL Suspension 1 milliGRAM(s) Oral <User Schedule>    MEDICATIONS  (PRN):      Vitals:  T(C): 36.9 (02-27-25 @ 08:00), Max: 37.1 (02-26-25 @ 23:00)  HR: 92 (02-27-25 @ 10:00) (85 - 110)  BP: 144/60 (02-27-25 @ 10:00) (95/54 - 160/68)  RR: 16 (02-27-25 @ 10:00) (11 - 32)  SpO2: 98% (02-27-25 @ 10:00) (94% - 100%)    Physical Examination:   General - on precedex, room air    Neurologic Exam:  Mental status - eyes closed, minimally responsive, no verbal output, not following commands    Cranial nerves - PERRL,  eyes cross midline, face grossly symmetric  Motor -  Strength testing  no spontaneous movement of extremities       Coordination - No tremors appreciated    Gait and station - Unable to assess     Labs:                        7.5    12.24 )-----------( 155      ( 27 Feb 2025 00:47 )             24.0     02-27    136  |  102  |  22  ----------------------------<  150[H]  4.9   |  21[L]  |  1.62[H]    Ca    7.7[L]      27 Feb 2025 06:42  Phos  3.4     02-27  Mg     2.5     02-27    TPro  5.2[L]  /  Alb  2.4[L]  /  TBili  1.2  /  DBili  x   /  AST  33  /  ALT  12  /  AlkPhos  51  02-27    CAPILLARY BLOOD GLUCOSE      POCT Blood Glucose.: 146 mg/dL (27 Feb 2025 11:12)    LIVER FUNCTIONS - ( 27 Feb 2025 06:42 )  Alb: 2.4 g/dL / Pro: 5.2 g/dL / ALK PHOS: 51 U/L / ALT: 12 U/L / AST: 33 U/L / GGT: x             Culture - Blood (collected 25 Feb 2025 11:40)  Source: .Blood Blood-Peripheral  Preliminary Report (26 Feb 2025 16:01):    No growth at 24 hours    Culture - Blood (collected 25 Feb 2025 11:35)  Source: .Blood Blood-Peripheral  Preliminary Report (26 Feb 2025 16:01):    No growth at 24 hours          Radiology:  < from: MR Head w/wo IV Cont (02.26.25 @ 23:19) >  IMPRESSION: Small subacute lacunar infarct within the right cerebellar   hemisphere/brachium pontis junction with associated cytotoxic edema.    No acute intracranial hemorrhage. No abnormal intracranial enhancement.    Multiple extensive patchy confluent nonspecific abnormal white matter   foci of T2/FLAIR prolongation statistically favoring microvascular type   changes.    TTE 2/25   1.Left ventricular cavity is normal in size. Left ventricular wall thickness is normal. Septal motion is abnormal consistent with previous cardiac surgery. Left ventricular systolic function is normal with an ejection fraction of 54 % by Sherman's method of disks. There are no regional wall motion abnormalities seen.   2. Normal left ventricular diastolic function, with normal left ventricular filling pressure.   3. Mildly enlarged right ventricular cavity size, with normal wall thickness, and reduced right ventricular systolic function. Tricuspid annular plane systolic excursion (TAPSE) is 1.4 cm (normal >=1.7 cm).   4. Left atrium is normal in size.   5. No pericardial effusion seen.   6. Compared to the transthoracic echocardiogram performedon 2/12/2025, there have been no significant interval changes.   7. Left ventricular global longitudinal strain is -18.6 % which is normal (< -18%). Images were acquired on a [employer] ultrasound system and processed using Nuvyyo strain analysis software with a heart rate of 112 bpm and a blood pressure of 127/60 mmHg.

## 2025-02-27 NOTE — PROGRESS NOTE ADULT - CRITICAL CARE ATTENDING COMMENT
meds: presedex, mepron, nystatin, valcyte, asa, statin, prograf 1 bid (2.9 yest) solucortef 75 tid, zocyn.   CVP 6, afeb, 90SR, 140/-150/   I/O CVVH even. No UO  Repeat CT head, chest, abdo: RLL atelexasis, fluid filled distention of small and large bowel.   MRI brain: small right subacute lacunar infarct with edema.   02-27    136  |  102  |  22  ----------------------------<  150[H]  4.9   |  21[L]  |  1.62[H]    Ca    7.7[L]      27 Feb 2025 06:42  Phos  3.4     02-27  Mg     2.5     02-27    TPro  5.2[L]  /  Alb  2.4[L]  /  TBili  1.2  /  DBili  x   /  AST  33  /  ALT  12  /  AlkPhos  51  02-27                        7.5    12.24 )-----------( 155      ( 27 Feb 2025 00:47 )             24.0   Discussed with CICU.   Hemodynamics improved.   Anuric.   Remains confused- not MRI finding.   Suspect occult infection- discussed with Dr Loving.  Plan:  Repeat BC.   I=O with CVVH.   Transplant pharmacy will adjust prograf- needs daily trough levels.  Marvin Campbell

## 2025-02-27 NOTE — PROGRESS NOTE ADULT - ASSESSMENT
72yo M pmhx HTN, HLD, nonischemic cardiomyopathy, chronic systolic heart failure s/p HM2 LVAD (6/2017), s/p heart transplant from Hep. 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 Pt here with change in MS, hyperkalemia and norman.   Patient was given Calcium Gluconate 2g, D50/Insulin 5 unit, 40 mg IV lasix, Lokelma 10mg.   Vancomycin and Zosyn       Hyperkalemia:  Pt with K level of 6.7 that improved to 6.1 after medical management with additional dose of insulin given.   K 5.6 initially   On CRRT now. Serum K is better now.   Monitor labs.     Stage 3 chronic kidney disease: with superimposed NORMAN:  Patient with known history of CKD. Upon HIE review, Scr ranged from 1.3-2.1 in 2023. Most recent Scr was 1.75 on 1/30/24.   Scr is worsening now.  2.49 --->2.75--> 4.34--> 4.65---> 6.64 -->6.68 ---> 6.89.  pt was started on CRRT 2/23  CPK is down trending. - Pt denies any seizure before coming to hospital. (Though not reliable history) - EEG ruled out seizures.   NORMAN likely in setting of ? underlying infection related and hemodynamic mediated ATN. Phos level was high. uric acid low, less likely TLS but uric acid was checked post CRRT  Transplant ID reccs appreciated.   Pt has respiratory acidosis as well along with metabolic acidosis.   neurology is on board.     PLAN:  C/w CRRT. bags adjusted as per latest labs. Pt is on vasopressin gtt.   Phos binders work with PO diet only. No need for now.    Resp acidosis management as per primary team.   Monitor labs, daily wts and I/Os.   Check tacro trough daily. - 7.4---> 2.4 - Immuno suppression as per primary team.   on empirical Abx. Infectious work up as per primary team and ID.   Dose medications as per eGFR for now.     CRRT was initiated on 2/23.   Cont for now, no major changes, not clotting thus far  Uremia is not the cause of his Change in mental status as his crt and BUN were baseline when he came in with this change in MS and worsened in the hospital      Sarkis Hutchins MD  Office   Contact me directly via [employer] Teams     (After 5 pm or on weekends please page the on-call fellow/attending, can check [email or web address] for schedule. Login is andreia murrieta, schedule under [facility] medicine, psych, derm)

## 2025-02-27 NOTE — PROGRESS NOTE ADULT - ATTENDING COMMENTS
73 year old male PMH HTN, HLD, CKD, nonischemic cardiomyopathy, chronic systolic heart failure s/p HM2 LVAD (6/2017), s/p heart transplant from Hep. 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     At this point suspect that Pseudomonas bacteremia was secondary to either a respiratory source or gastrointestinal translocation.  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    Repeat CT chest abdomen pelvis without new infectious foci    He was noted to have a nodular and erythematous lesion draining hemorrhagic material on the right sternoclavicular border.  I have a lower suspicion this is the primary source for the Pseudomonas but secondary involvement with seeding in context of bacteremia is possible    Continue Zosyn for now for Pseudomonal coverage    Brain MRI with subacute lacunar infarct.  Would touch base with neurology to see if this is possibly contributing to encephalopathy.  If it is thought less likely to be, and encephalopathy persists, I would recommend pursuing lumbar puncture    I will continue to follow. Please feel free to contact me with any further questions.    Gianluca Loving M.D.  [facility] Division of Infectious Disease  8AM-5PM Monday - Friday: Available on [employer] Teams  After Hours and Holidays (or if no response on [employer] Teams): Please contact the Infectious Diseases Office at [phone]    The above assessment and plan were discussed with CICU Team

## 2025-02-27 NOTE — PROGRESS NOTE ADULT - SUBJECTIVE AND OBJECTIVE BOX
Follow Up:  Bacteremia    Interval History/ROS: afebrile, WBC 10-->12, more alert but remains confused. Bloody drainage reported from papular lesion near SC joint. remains on CRRT    Allergies  No Known Allergies        ANTIMICROBIALS:  atovaquone  Suspension 1500 daily  nystatin    Suspension 810543 four times a day  piperacillin/tazobactam IVPB.. 4.5 every 8 hours  valGANciclovir 50 mG/mL Oral Solution 450 <User Schedule>      OTHER MEDS:  MEDICATIONS  (STANDING):  aspirin  chewable 81 daily  atorvastatin 10 at bedtime  dexMEDEtomidine Infusion 0.5 <Continuous>  heparin   Injectable 5000 every 8 hours  hydrocortisone sodium succinate Injectable 75 every 8 hours  insulin lispro (ADMELOG) corrective regimen sliding scale  every 6 hours  pantoprazole  Injectable 40 two times a day  polyethylene glycol 3350 17 daily  senna 2 daily  tacrolimus    0.5 mG/mL Suspension 1 <User Schedule>      Vital Signs Last 24 Hrs  T(C): 37 (27 Feb 2025 11:00), Max: 37.1 (26 Feb 2025 23:00)  T(F): 98.6 (27 Feb 2025 11:00), Max: 98.8 (26 Feb 2025 23:00)  HR: 103 (27 Feb 2025 12:00) (85 - 110)  BP: 132/60 (27 Feb 2025 12:00) (95/54 - 160/68)  BP(mean): 87 (27 Feb 2025 12:00) (68 - 100)  RR: 22 (27 Feb 2025 12:00) (11 - 32)  SpO2: 92% (27 Feb 2025 12:00) (92% - 100%)    Parameters below as of 27 Feb 2025 12:00  Patient On (Oxygen Delivery Method): room air        PHYSICAL EXAM:    General: Patient in NAD  HEENT: NCAT, NGT in place, Left neck CVC  CV: S1+S2, no m/r/g appreciated   Lungs: No respiratory distress, CTA anteriorly  Abd: Soft, nontender, no guarding, no rebound tenderness, + bowel sounds   : No suprapubic tenderness  Neuro: Somnolent, Moves all extremities against gravity.  Ext: No cyanosis, no edema, LUE arm blistering dressed   Skin: erythematous papule at the right sternoclavicular border - no drainage or tenderness                                7.5    12.24 )-----------( 155      ( 27 Feb 2025 00:47 )             24.0       02-27    136  |  102  |  22  ----------------------------<  132[H]  4.7   |  22  |  1.62[H]    Ca    7.5[L]      27 Feb 2025 11:33  Phos  3.5     02-27  Mg     2.5     02-27    TPro  5.1[L]  /  Alb  2.5[L]  /  TBili  1.2  /  DBili  x   /  AST  28  /  ALT  13  /  AlkPhos  49  02-27      Urinalysis Basic - ( 27 Feb 2025 11:33 )    Color: x / Appearance: x / SG: x / pH: x  Gluc: 132 mg/dL / Ketone: x  / Bili: x / Urobili: x   Blood: x / Protein: x / Nitrite: x   Leuk Esterase: x / RBC: x / WBC x   Sq Epi: x / Non Sq Epi: x / Bacteria: x        MICROBIOLOGY:  v    Culture - Blood (collected 25 Feb 2025 11:40)  Source: .Blood Blood-Peripheral  Preliminary Report (26 Feb 2025 16:01):    No growth at 24 hours    Culture - Blood (collected 25 Feb 2025 11:35)  Source: .Blood Blood-Peripheral  Preliminary Report (26 Feb 2025 16:01):    No growth at 24 hours    Culture - Blood (collected 24 Feb 2025 00:35)  Source: .Blood Blood-Peripheral  Preliminary Report (27 Feb 2025 03:01):    No growth at 72 Hours    Culture - Blood (collected 24 Feb 2025 00:20)  Source: .Blood Blood-Peripheral  Preliminary Report (27 Feb 2025 03:01):    No growth at 72 Hours            Toxoplasma IgG Screen: 10.70 IU/mL (02-21-25 @ 09:38)    CMVPCR Log: NotDetec [id number]/mL (02-22 @ 07:33)        RADIOLOGY:    Bronchial mucus plugging with right lower lobe atelectasis.    No CT evidence of occult abscessin the abdomen or pelvis.     Follow Up:  Bacteremia    Interval History/ROS: afebrile, WBC 10-->12, more alert but remains confused. Bloody drainage reported from papular lesion near SC joint, remains on CRRT    Allergies  No Known Allergies        ANTIMICROBIALS:  atovaquone  Suspension 1500 daily  nystatin    Suspension [id number] four times a day  piperacillin/tazobactam IVPB.. 4.5 every 8 hours  valGANciclovir 50 mG/mL Oral Solution 450 <User Schedule>      OTHER MEDS:  MEDICATIONS  (STANDING):  aspirin  chewable 81 daily  atorvastatin 10 at bedtime  dexMEDEtomidine Infusion 0.5 <Continuous>  heparin   Injectable 5000 every 8 hours  hydrocortisone sodium succinate Injectable 75 every 8 hours  insulin lispro (ADMELOG) corrective regimen sliding scale  every 6 hours  pantoprazole  Injectable 40 two times a day  polyethylene glycol 3350 17 daily  senna 2 daily  tacrolimus    0.5 mG/mL Suspension 1 <User Schedule>      Vital Signs Last 24 Hrs  T(C): 37 (27 Feb 2025 11:00), Max: 37.1 (26 Feb 2025 23:00)  T(F): 98.6 (27 Feb 2025 11:00), Max: 98.8 (26 Feb 2025 23:00)  HR: 103 (27 Feb 2025 12:00) (85 - 110)  BP: 132/60 (27 Feb 2025 12:00) (95/54 - 160/68)  BP(mean): 87 (27 Feb 2025 12:00) (68 - 100)  RR: 22 (27 Feb 2025 12:00) (11 - 32)  SpO2: 92% (27 Feb 2025 12:00) (92% - 100%)    Parameters below as of 27 Feb 2025 12:00  Patient On (Oxygen Delivery Method): room air        PHYSICAL EXAM:    General: Patient in NAD  HEENT: NCAT, NGT in place, Left neck CVC  CV: S1+S2, no m/r/g appreciated   Lungs: No respiratory distress, CTA anteriorly  Abd: Soft, nontender, no guarding, no rebound tenderness, + bowel sounds   : No suprapubic tenderness  Neuro: Somnolent, Moves all extremities against gravity.  Ext: No cyanosis, no edema, LUE arm blistering dressed   Skin: erythematous papule at the right sternoclavicular border - no drainage or tenderness                                7.5    12.24 )-----------( 155      ( 27 Feb 2025 00:47 )             24.0       02-27    136  |  102  |  22  ----------------------------<  132[H]  4.7   |  22  |  1.62[H]    Ca    7.5[L]      27 Feb 2025 11:33  Phos  3.5     02-27  Mg     2.5     02-27    TPro  5.1[L]  /  Alb  2.5[L]  /  TBili  1.2  /  DBili  x   /  AST  28  /  ALT  13  /  AlkPhos  49  02-27      Urinalysis Basic - ( 27 Feb 2025 11:33 )    Color: x / Appearance: x / SG: x / pH: x  Gluc: 132 mg/dL / Ketone: x  / Bili: x / Urobili: x   Blood: x / Protein: x / Nitrite: x   Leuk Esterase: x / RBC: x / WBC x   Sq Epi: x / Non Sq Epi: x / Bacteria: x        MICROBIOLOGY:  v    Culture - Blood (collected 25 Feb 2025 11:40)  Source: .Blood Blood-Peripheral  Preliminary Report (26 Feb 2025 16:01):    No growth at 24 hours    Culture - Blood (collected 25 Feb 2025 11:35)  Source: .Blood Blood-Peripheral  Preliminary Report (26 Feb 2025 16:01):    No growth at 24 hours    Culture - Blood (collected 24 Feb 2025 00:35)  Source: .Blood Blood-Peripheral  Preliminary Report (27 Feb 2025 03:01):    No growth at 72 Hours    Culture - Blood (collected 24 Feb 2025 00:20)  Source: .Blood Blood-Peripheral  Preliminary Report (27 Feb 2025 03:01):    No growth at 72 Hours            Toxoplasma IgG Screen: 10.70 IU/mL (02-21-25 @ 09:38)    CMVPCR Log: NotDetec [id number]/mL (02-22 @ 07:33)        RADIOLOGY:    Bronchial mucus plugging with right lower lobe atelectasis.    No CT evidence of occult abscessin the abdomen or pelvis.   Follow Up:  Bacteremia    Interval History/ROS: afebrile, WBC 10-->12, more alert but remains confused. Bloody drainage reported from papular lesion near SC joint, remains on CRRT    Allergies  No Known Allergies    ANTIMICROBIALS:  atovaquone  Suspension 1500 daily  nystatin    Suspension 103888 four times a day  piperacillin/tazobactam IVPB.. 4.5 every 8 hours  valGANciclovir 50 mG/mL Oral Solution 450 <User Schedule>    OTHER MEDS:  MEDICATIONS  (STANDING):  aspirin  chewable 81 daily  atorvastatin 10 at bedtime  dexMEDEtomidine Infusion 0.5 <Continuous>  heparin   Injectable 5000 every 8 hours  hydrocortisone sodium succinate Injectable 75 every 8 hours  insulin lispro (ADMELOG) corrective regimen sliding scale  every 6 hours  pantoprazole  Injectable 40 two times a day  polyethylene glycol 3350 17 daily  senna 2 daily  tacrolimus    0.5 mG/mL Suspension 1 <User Schedule>      Vital Signs Last 24 Hrs  T(C): 37 (27 Feb 2025 11:00), Max: 37.1 (26 Feb 2025 23:00)  T(F): 98.6 (27 Feb 2025 11:00), Max: 98.8 (26 Feb 2025 23:00)  HR: 103 (27 Feb 2025 12:00) (85 - 110)  BP: 132/60 (27 Feb 2025 12:00) (95/54 - 160/68)  BP(mean): 87 (27 Feb 2025 12:00) (68 - 100)  RR: 22 (27 Feb 2025 12:00) (11 - 32)  SpO2: 92% (27 Feb 2025 12:00) (92% - 100%)    Parameters below as of 27 Feb 2025 12:00  Patient On (Oxygen Delivery Method): room air    PHYSICAL EXAM:    General: Patient in NAD  HEENT: NCAT, NGT in place, Left neck CVC  CV: S1+S2, no m/r/g appreciated   Lungs: No respiratory distress, CTA anteriorly  Abd: Soft, nontender, no guarding, no rebound tenderness, + bowel sounds   : No suprapubic tenderness  Neuro: Somnolent, Moves all extremities against gravity.  Ext: No cyanosis, no edema, LUE arm blistering dressed   Skin: erythematous papule at the right sternoclavicular border - no drainage or tenderness                                7.5    12.24 )-----------( 155      ( 27 Feb 2025 00:47 )             24.0       02-27    136  |  102  |  22  ----------------------------<  132[H]  4.7   |  22  |  1.62[H]    Ca    7.5[L]      27 Feb 2025 11:33  Phos  3.5     02-27  Mg     2.5     02-27    TPro  5.1[L]  /  Alb  2.5[L]  /  TBili  1.2  /  DBili  x   /  AST  28  /  ALT  13  /  AlkPhos  49  02-27      Urinalysis Basic - ( 27 Feb 2025 11:33 )    Color: x / Appearance: x / SG: x / pH: x  Gluc: 132 mg/dL / Ketone: x  / Bili: x / Urobili: x   Blood: x / Protein: x / Nitrite: x   Leuk Esterase: x / RBC: x / WBC x   Sq Epi: x / Non Sq Epi: x / Bacteria: x        MICROBIOLOGY:  v    Culture - Blood (collected 25 Feb 2025 11:40)  Source: .Blood Blood-Peripheral  Preliminary Report (26 Feb 2025 16:01):    No growth at 24 hours    Culture - Blood (collected 25 Feb 2025 11:35)  Source: .Blood Blood-Peripheral  Preliminary Report (26 Feb 2025 16:01):    No growth at 24 hours    Culture - Blood (collected 24 Feb 2025 00:35)  Source: .Blood Blood-Peripheral  Preliminary Report (27 Feb 2025 03:01):    No growth at 72 Hours    Culture - Blood (collected 24 Feb 2025 00:20)  Source: .Blood Blood-Peripheral  Preliminary Report (27 Feb 2025 03:01):    No growth at 72 Hours            Toxoplasma IgG Screen: 10.70 IU/mL (02-21-25 @ 09:38)    CMVPCR Log: NotDetec [id number]/mL (02-22 @ 07:33)        RADIOLOGY:    <The imaging below has been reviewed and visualized by me independently. Findings as detailed in report below>    Bronchial mucus plugging with right lower lobe atelectasis.    No CT evidence of occult abscess the abdomen or pelvis.

## 2025-02-27 NOTE — PROGRESS NOTE ADULT - SUBJECTIVE AND OBJECTIVE BOX
[facility] DIVISION OF KIDNEY DISEASES AND HYPERTENSION -- FOLLOW UP NOTE  --------------------------------------------------------------------------------  Chief Complaint:    24 hour events/subjective:  pt overall stable on CRRT  mental status some improvement    PAST HISTORY  --------------------------------------------------------------------------------  No significant changes to PMH, PSH, FHx, SHx, unless otherwise noted    ALLERGIES & MEDICATIONS  --------------------------------------------------------------------------------  Allergies    No Known Allergies    Intolerances      Standing Inpatient Medications  aspirin  chewable 81 milliGRAM(s) Oral daily  atorvastatin 10 milliGRAM(s) Oral at bedtime  atovaquone  Suspension 1500 milliGRAM(s) Oral daily  chlorhexidine 2% Cloths 1 Application(s) Topical daily  cholecalciferol 1000 Unit(s) Oral daily  CRRT Treatment    <Continuous>  dexMEDEtomidine Infusion 0.5 MICROgram(s)/kG/Hr IV Continuous <Continuous>  heparin   Injectable 5000 Unit(s) SubCutaneous every 8 hours  hydrocortisone sodium succinate Injectable 75 milliGRAM(s) IV Push every 8 hours  insulin lispro (ADMELOG) corrective regimen sliding scale   SubCutaneous every 6 hours  nystatin    Suspension 787154 Unit(s) Oral four times a day  pantoprazole  Injectable 40 milliGRAM(s) IV Push two times a day  Phoxillum Filtration BK 4 / 2.5 5000 milliLiter(s) CRRT <Continuous>  Phoxillum Filtration BK 4 / 2.5 5000 milliLiter(s) CRRT <Continuous>  piperacillin/tazobactam IVPB.. 4.5 Gram(s) IV Intermittent every 8 hours  polyethylene glycol 3350 17 Gram(s) Oral daily  PrismaSATE Dialysate BGK 4 / 2.5 5000 milliLiter(s) CRRT <Continuous>  senna 2 Tablet(s) Oral daily  tacrolimus    0.5 mG/mL Suspension 1 milliGRAM(s) Oral <User Schedule>  valGANciclovir 50 mG/mL Oral Solution 450 milliGRAM(s) Oral <User Schedule>    PRN Inpatient Medications      REVIEW OF SYSTEMS  unable to do    VITALS/PHYSICAL EXAM  --------------------------------------------------------------------------------  T(C): 37 (02-27-25 @ 11:00), Max: 37.1 (02-26-25 @ 23:00)  HR: 103 (02-27-25 @ 12:00) (85 - 110)  BP: 132/60 (02-27-25 @ 12:00) (95/54 - 160/68)  RR: 22 (02-27-25 @ 12:00) (11 - 32)  SpO2: 92% (02-27-25 @ 12:00) (92% - 100%)  Wt(kg): --        02-26-25 @ 07:01  -  02-27-25 @ 07:00  --------------------------------------------------------  IN: 1000.8 mL / OUT: 1060 mL / NET: -59.2 mL    02-27-25 @ 07:01  -  02-27-25 @ 12:31  --------------------------------------------------------  IN: 359.6 mL / OUT: 245 mL / NET: 114.6 mL          LABS/STUDIES  --------------------------------------------------------------------------------              7.5    12.24 >-----------<  155      [02-27-25 @ 00:47]              24.0     136  |  102  |  22  ----------------------------<  132      [02-27-25 @ 11:33]  4.7   |  22  |  1.62        Ca     7.5     [02-27-25 @ 11:33]      Mg     2.5     [02-27-25 @ 11:33]      Phos  3.5     [02-27-25 @ 11:33]    TPro  5.1  /  Alb  2.5  /  TBili  1.2  /  DBili  x   /  AST  28  /  ALT  13  /  AlkPhos  49  [02-27-25 @ 11:33]          Creatinine Trend:  SCr 1.62 [02-27 @ 11:33]  SCr 1.62 [02-27 @ 06:42]  SCr 1.79 [02-27 @ 00:47]  SCr 1.77 [02-26 @ 17:14]  SCr 1.75 [02-26 @ 11:30]    Urinalysis - [02-27-25 @ 11:33]      Color  / Appearance  / SG  / pH       Gluc 132 / Ketone   / Bili  / Urobili        Blood  / Protein  / Leuk Est  / Nitrite       RBC  / WBC  / Hyaline  / Gran  / Sq Epi  / Non Sq Epi  / Bacteria       TSH 2.57      [02-19-25 @ 17:28]  Lipid: chol 95, TG 65, HDL 55, LDL --      [02-20-25 @ 05:35]      Syphilis Screen (Treponema Pallidum Ab) Negative      [02-21-25 @ 09:38]

## 2025-02-27 NOTE — PROGRESS NOTE ADULT - ATTENDING COMMENTS
Patient seen and examined at bedside. Currently patient on precede.     I agree with the findings and plan as documented in the Resident's note except below.    Mr. 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 Neurology reconsulted for persistent altered mental status. Etiology of altered mental status is uncertain but perhaps due to the metabolic encephalopathy s/p sedative medication versus encephalitis given history of immunocompromised versus vascular etiology.  Currently patient on precede and unable to asses clinically.   MRI brain showed small subacute lacunar infarct within the right cerebellar hemisphere/brachium pontis junction with associated cytotoxic edema.  Also T2/FLAIR sequence showed nonspecific abnormal white matter disease.  Patient will benefit from anticoagulation if no contraindication for secondary stroke prevention.   Continue medical management, neuro- check and fall precaution.  GI and DVT prophylaxis.  PT and OT once patient is stable.     Patient is at high risk for complications and morbidity or mortality. There is high probability of imminent or life threatening deterioration in the patient's condition.  Patient is unable or incompetent to participate in giving a history and/or making decisions and discussion is necessary for determining treatment decisions.    My cumulative time taking care of this critically ill patient is 55 minutes  If you have any further questions, please do not hesitate to contact our team.  Thank you for allowing us to participate in this patient care.

## 2025-02-28 NOTE — PROGRESS NOTE ADULT - SUBJECTIVE AND OBJECTIVE BOX
PATIENT: BILLY RUSSELL, MRN: [id number]    CHIEF COMPLAINT: Patient is a 74y old  Male who presents with a chief complaint of Lethargy (28 Feb 2025 08:28)      INTERVAL HISTORY/OVERNIGHT EVENTS: No overnight events. Denies abdominal pain, chest pain or SOB, cough. Has been ambulating with assistance. Oriented to person, place, and time. Breathing comfortably on room air.    REVIEW OF SYSTEMS:    Constitutional:     [ ] negative [ ] fevers [ ] chills [ ] weight loss [ ] weight gain  HEENT:                  [ ] negative [ ] dry eyes [ ] eye irritation [ ] postnasal drip [ ] nasal congestion  CV:                         [ ] negative  [ ] chest pain [ ] orthopnea [ ] palpitations [ ] murmur  Resp:                     [ ] negative [ ] cough [ ] shortness of breath [ ] dyspnea [ ] wheezing [ ] sputum [ ] hemoptysis  GI:                          [ ] negative [ ] nausea [ ] vomiting [ ] diarrhea [ ] constipation [ ] abd pain [ ] dysphagia   :                        [ ] negative [ ] dysuria [ ] nocturia [ ] hematuria [ ] increased urinary frequency  Musculoskeletal: [ ] negative [ ] back pain [ ] myalgias [ ] arthralgias [ ] fracture  Skin:                       [ ] negative [ ] rash [ ] itch  Neurological:        [ ] negative [ ] headache [ ] dizziness [ ] syncope [ ] weakness [ ] numbness  Psychiatric:           [ ] negative [ ] anxiety [ ] depression  Endocrine:            [ ] negative [ ] diabetes [ ] thyroid problem  Heme/Lymph:      [ ] negative [ ] anemia [ ] bleeding problem  Allergic/Immune: [ ] negative [ ] itchy eyes [ ] nasal discharge [ ] hives [ ] angioedema    [ ] All other systems negative  [ ] Unable to assess ROS because ________.    MEDICATIONS:  MEDICATIONS  (STANDING):  aspirin  chewable 81 milliGRAM(s) Oral daily  atorvastatin 10 milliGRAM(s) Oral at bedtime  atovaquone  Suspension 1500 milliGRAM(s) Oral daily  chlorhexidine 2% Cloths 1 Application(s) Topical daily  cholecalciferol 1000 Unit(s) Oral daily  CRRT Treatment    <Continuous>  dexMEDEtomidine Infusion 0.5 MICROgram(s)/kG/Hr (11.3 mL/Hr) IV Continuous <Continuous>  heparin   Injectable 5000 Unit(s) SubCutaneous every 8 hours  hydrocortisone sodium succinate Injectable 75 milliGRAM(s) IV Push every 8 hours  insulin lispro (ADMELOG) corrective regimen sliding scale   SubCutaneous every 6 hours  nystatin    Suspension 746686 Unit(s) Oral four times a day  pantoprazole  Injectable 40 milliGRAM(s) IV Push two times a day  Phoxillum Filtration BK 4 / 2.5 5000 milliLiter(s) (1000 mL/Hr) CRRT <Continuous>  piperacillin/tazobactam IVPB.. 4.5 Gram(s) IV Intermittent every 8 hours  polyethylene glycol 3350 17 Gram(s) Oral daily  PrismaSATE Dialysate BGK 4 / 2.5 5000 milliLiter(s) (1200 mL/Hr) CRRT <Continuous>  PrismaSOL Filtration BGK 0 / 2.5 5000 milliLiter(s) (200 mL/Hr) CRRT <Continuous>  senna 2 Tablet(s) Oral daily  tacrolimus    0.5 mG/mL Suspension 1.75 milliGRAM(s) Oral <User Schedule>  valGANciclovir 50 mG/mL Oral Solution 450 milliGRAM(s) Oral <User Schedule>    MEDICATIONS  (PRN):      ALLERGIES: Allergies    No Known Allergies    Intolerances        OBJECTIVE:  ICU Vital Signs Last 24 Hrs  T(C): 37.8 (28 Feb 2025 07:00), Max: 37.8 (28 Feb 2025 07:00)  T(F): 100 (28 Feb 2025 07:00), Max: 100 (28 Feb 2025 07:00)  HR: 107 (28 Feb 2025 08:00) (86 - 141)  BP: 142/77 (28 Feb 2025 08:00) (112/66 - 159/64)  BP(mean): 101 (28 Feb 2025 08:00) (82 - 101)  ABP: --  ABP(mean): --  RR: 26 (28 Feb 2025 08:00) (12 - 30)  SpO2: 100% (28 Feb 2025 08:00) (92% - 100%)    O2 Parameters below as of 28 Feb 2025 08:00  Patient On (Oxygen Delivery Method): room air            Adult Advanced Hemodynamics Last 24 Hrs  CVP(mm Hg): --  CVP(cm H2O): --  CO: --  CI: --  PA: --  PA(mean): --  PCWP: --  SVR: --  SVRI: --  PVR: --  PVRI: --  CAPILLARY BLOOD GLUCOSE      POCT Blood Glucose.: 109 mg/dL (28 Feb 2025 05:27)  POCT Blood Glucose.: 122 mg/dL (27 Feb 2025 23:55)  POCT Blood Glucose.: 120 mg/dL (27 Feb 2025 17:04)  POCT Blood Glucose.: 146 mg/dL (27 Feb 2025 11:12)    CAPILLARY BLOOD GLUCOSE      POCT Blood Glucose.: 109 mg/dL (28 Feb 2025 05:27)    I&O's Summary    27 Feb 2025 07:01  -  28 Feb 2025 07:00  --------------------------------------------------------  IN: 1253.1 mL / OUT: 1200 mL / NET: 53.1 mL    28 Feb 2025 07:01  -  28 Feb 2025 09:03  --------------------------------------------------------  IN: 155.4 mL / OUT: 50 mL / NET: 105.4 mL      Daily     Daily     PHYSICAL EXAMINATION:  General: Comfortable, no acute distress, cooperative with exam.  HEENT: Moist mucous membranes.  Respiratory: CTAB, normal respiratory effort, no coughing, wheezes, crackles, or rales.  CV: RRR, S1S2, no murmurs, rubs or gallops. No JVD. Distal pulses intact.  Abdominal: Soft, nontender, nondistended, no rebound or guarding, normal bowel sounds.  Neurology: AOx3, no focal neuro defects, RUVALCABA x 4.  Extremities: No pitting edema, + Peripheral pulses.  Cath site:   Tubes:    LABS:                          8.0    14.63 )-----------( 170      ( 28 Feb 2025 00:41 )             25.4     02-28    136  |  102  |  20  ----------------------------<  107[H]  4.8   |  22  |  1.64[H]    Ca    7.9[L]      28 Feb 2025 06:24  Phos  2.9     02-28  Mg     2.6     02-28    TPro  5.4[L]  /  Alb  2.5[L]  /  TBili  1.1  /  DBili  x   /  AST  32  /  ALT  13  /  AlkPhos  51  02-28    LIVER FUNCTIONS - ( 28 Feb 2025 06:24 )  Alb: 2.5 g/dL / Pro: 5.4 g/dL / ALK PHOS: 51 U/L / ALT: 13 U/L / AST: 32 U/L / GGT: x                   Urinalysis Basic - ( 28 Feb 2025 06:24 )    Color: x / Appearance: x / SG: x / pH: x  Gluc: 107 mg/dL / Ketone: x  / Bili: x / Urobili: x   Blood: x / Protein: x / Nitrite: x   Leuk Esterase: x / RBC: x / WBC x   Sq Epi: x / Non Sq Epi: x / Bacteria: x        TELEMETRY:     EKG:     IMAGING:    PATIENT: BILLY RUSSELL, MRN: [id number]    CHIEF COMPLAINT: Patient is a 74y old  Male who presents with a chief complaint of Lethargy (28 Feb 2025 08:28)      INTERVAL HISTORY/OVERNIGHT EVENTS: Pt with agitation again overnight, given haldol x1 and fentanyl x1, c/w precedex gtt, also given seroquel 25mg.  Breathing comfortably on room air.      REVIEW OF SYSTEMS:    Constitutional:     [ ] negative [ ] fevers [ ] chills [ ] weight loss [ ] weight gain  HEENT:                  [ ] negative [ ] dry eyes [ ] eye irritation [ ] postnasal drip [ ] nasal congestion  CV:                         [ ] negative  [ ] chest pain [ ] orthopnea [ ] palpitations [ ] murmur  Resp:                     [ ] negative [ ] cough [ ] shortness of breath [ ] dyspnea [ ] wheezing [ ] sputum [ ] hemoptysis  GI:                          [ ] negative [ ] nausea [ ] vomiting [ ] diarrhea [ ] constipation [ ] abd pain [ ] dysphagia   :                        [ ] negative [ ] dysuria [ ] nocturia [ ] hematuria [ ] increased urinary frequency  Musculoskeletal: [ ] negative [ ] back pain [ ] myalgias [ ] arthralgias [ ] fracture  Skin:                       [ ] negative [ ] rash [ ] itch  Neurological:        [ ] negative [ ] headache [ ] dizziness [ ] syncope [ ] weakness [ ] numbness  Psychiatric:           [ ] negative [ ] anxiety [ ] depression  Endocrine:            [ ] negative [ ] diabetes [ ] thyroid problem  Heme/Lymph:      [ ] negative [ ] anemia [ ] bleeding problem  Allergic/Immune: [ ] negative [ ] itchy eyes [ ] nasal discharge [ ] hives [ ] angioedema    [ ] All other systems negative  [X] Unable to assess ROS because AMS.    MEDICATIONS:  MEDICATIONS  (STANDING):  aspirin  chewable 81 milliGRAM(s) Oral daily  atorvastatin 10 milliGRAM(s) Oral at bedtime  atovaquone  Suspension 1500 milliGRAM(s) Oral daily  chlorhexidine 2% Cloths 1 Application(s) Topical daily  cholecalciferol 1000 Unit(s) Oral daily  CRRT Treatment    <Continuous>  dexMEDEtomidine Infusion 0.5 MICROgram(s)/kG/Hr (11.3 mL/Hr) IV Continuous <Continuous>  heparin   Injectable 5000 Unit(s) SubCutaneous every 8 hours  hydrocortisone sodium succinate Injectable 75 milliGRAM(s) IV Push every 8 hours  insulin lispro (ADMELOG) corrective regimen sliding scale   SubCutaneous every 6 hours  nystatin    Suspension 182728 Unit(s) Oral four times a day  pantoprazole  Injectable 40 milliGRAM(s) IV Push two times a day  Phoxillum Filtration BK 4 / 2.5 5000 milliLiter(s) (1000 mL/Hr) CRRT <Continuous>  piperacillin/tazobactam IVPB.. 4.5 Gram(s) IV Intermittent every 8 hours  polyethylene glycol 3350 17 Gram(s) Oral daily  PrismaSATE Dialysate BGK 4 / 2.5 5000 milliLiter(s) (1200 mL/Hr) CRRT <Continuous>  PrismaSOL Filtration BGK 0 / 2.5 5000 milliLiter(s) (200 mL/Hr) CRRT <Continuous>  senna 2 Tablet(s) Oral daily  tacrolimus    0.5 mG/mL Suspension 1.75 milliGRAM(s) Oral <User Schedule>  valGANciclovir 50 mG/mL Oral Solution 450 milliGRAM(s) Oral <User Schedule>    MEDICATIONS  (PRN):      ALLERGIES: Allergies    No Known Allergies    Intolerances        OBJECTIVE:  ICU Vital Signs Last 24 Hrs  T(C): 37.8 (28 Feb 2025 07:00), Max: 37.8 (28 Feb 2025 07:00)  T(F): 100 (28 Feb 2025 07:00), Max: 100 (28 Feb 2025 07:00)  HR: 107 (28 Feb 2025 08:00) (86 - 141)  BP: 142/77 (28 Feb 2025 08:00) (112/66 - 159/64)  BP(mean): 101 (28 Feb 2025 08:00) (82 - 101)  ABP: --  ABP(mean): --  RR: 26 (28 Feb 2025 08:00) (12 - 30)  SpO2: 100% (28 Feb 2025 08:00) (92% - 100%)    O2 Parameters below as of 28 Feb 2025 08:00  Patient On (Oxygen Delivery Method): room air            Adult Advanced Hemodynamics Last 24 Hrs  CVP(mm Hg): --  CVP(cm H2O): --  CO: --  CI: --  PA: --  PA(mean): --  PCWP: --  SVR: --  SVRI: --  PVR: --  PVRI: --  CAPILLARY BLOOD GLUCOSE      POCT Blood Glucose.: 109 mg/dL (28 Feb 2025 05:27)  POCT Blood Glucose.: 122 mg/dL (27 Feb 2025 23:55)  POCT Blood Glucose.: 120 mg/dL (27 Feb 2025 17:04)  POCT Blood Glucose.: 146 mg/dL (27 Feb 2025 11:12)    CAPILLARY BLOOD GLUCOSE      POCT Blood Glucose.: 109 mg/dL (28 Feb 2025 05:27)    I&O's Summary    27 Feb 2025 07:01  -  28 Feb 2025 07:00  --------------------------------------------------------  IN: 1253.1 mL / OUT: 1200 mL / NET: 53.1 mL    28 Feb 2025 07:01  -  28 Feb 2025 09:03  --------------------------------------------------------  IN: 155.4 mL / OUT: 50 mL / NET: 105.4 mL      Daily     Daily     PHYSICAL EXAMINATION:  Constitutional: laying in bed, arousable, agitated, frequently moving arms and head, following few commands  HEENT: NC/AT, PERRLA, EOMI, no conjunctival pallor or scleral icterus, MMM  Neck: Supple, no JVD  Respiratory: CTA B/L. No w/r/r.   Cardiovascular: regular rate, normal S1 and S2, no m/r/g.   Gastrointestinal: mildly distended, +BS, nontender, no guarding or rebound tenderness, no palpable masses  Extremities: RUE edematous; no cyanosis, clubbing.  Neurological: AAOx1 (self), no CN deficits, strength and sensation intact throughout.   Skin: approx. 3x3cm area of hyperpigmentation and swelling to R parasternal upper chest wall, no fluctuance no open wounds.  LABS:                          8.0    14.63 )-----------( 170      ( 28 Feb 2025 00:41 )             25.4     02-28    136  |  102  |  20  ----------------------------<  107[H]  4.8   |  22  |  1.64[H]    Ca    7.9[L]      28 Feb 2025 06:24  Phos  2.9     02-28  Mg     2.6     02-28    TPro  5.4[L]  /  Alb  2.5[L]  /  TBili  1.1  /  DBili  x   /  AST  32  /  ALT  13  /  AlkPhos  51  02-28    LIVER FUNCTIONS - ( 28 Feb 2025 06:24 )  Alb: 2.5 g/dL / Pro: 5.4 g/dL / ALK PHOS: 51 U/L / ALT: 13 U/L / AST: 32 U/L / GGT: x                   Urinalysis Basic - ( 28 Feb 2025 06:24 )    Color: x / Appearance: x / SG: x / pH: x  Gluc: 107 mg/dL / Ketone: x  / Bili: x / Urobili: x   Blood: x / Protein: x / Nitrite: x   Leuk Esterase: x / RBC: x / WBC x   Sq Epi: x / Non Sq Epi: x / Bacteria: x        TELEMETRY:     EKG:     IMAGING:

## 2025-02-28 NOTE — BH CONSULTATION LIAISON ASSESSMENT NOTE - NSBHCHARTREVIEWVS_PSY_A_CORE FT
Vital Signs Last 24 Hrs  T(C): 37.1 (28 Feb 2025 11:00), Max: 37.8 (28 Feb 2025 07:00)  T(F): 98.7 (28 Feb 2025 11:00), Max: 100 (28 Feb 2025 07:00)  HR: 114 (28 Feb 2025 11:15) (100 - 141)  BP: 150/98 (28 Feb 2025 11:15) (112/66 - 163/68)  BP(mean): 115 (28 Feb 2025 11:15) (82 - 115)  RR: 24 (28 Feb 2025 11:15) (17 - 32)  SpO2: 98% (28 Feb 2025 11:15) (93% - 100%)    Parameters below as of 28 Feb 2025 11:00  Patient On (Oxygen Delivery Method): room air

## 2025-02-28 NOTE — BH CONSULTATION LIAISON ASSESSMENT NOTE - NSBHCHARTREVIEWINVESTIGATE_PSY_A_CORE FT
ec< from: 12 Lead ECG (02.28.25 @ 00:08) >  Systolic  mmHg  Diastolic BP 65 mmHg  Ventricular Rate 104 BPM  Atrial Rate 104 BPM  P-R Interval 190 ms  QRS Duration 126 ms  Q-T Interval 380 ms  QTC Calculation(Bazett) 499 ms    P Axis 48 degrees    R Axis 62 degrees    T Axis 40 degrees    Diagnosis Line SINUS TACHYCARDIA  RIGHT BUNDLE BRANCH BLOCK  ABNORMAL ECG  WHEN COMPARED WITH ECG OF 27-FEB-2025 17:57,  SINUS RHYTHM HAS REPLACED WIDE QRS TACHYCARDIA  VENT. RATE HAS DECREASED BY  55 BPM  Confirmed by MD NISHA, DAYNE ([id number]) on 2/28/2025 4:29:25 PM    < end of copied text >

## 2025-02-28 NOTE — PROGRESS NOTE ADULT - ATTENDING COMMENTS
Transfer from MICU to CICU care - case discussed with MICU team  Heart transplant recipient (2018)  Hypervolemic  Now with NORMAN on CKD - initiated HD  Heart Failure follow-up appreciated  ID input appreciated - MRI to evaluate for sternal wound infection; consider LP

## 2025-02-28 NOTE — BH CONSULTATION LIAISON ASSESSMENT NOTE - CURRENT MEDICATION
MEDICATIONS  (STANDING):  aspirin  chewable 81 milliGRAM(s) Oral daily  atorvastatin 10 milliGRAM(s) Oral at bedtime  atovaquone  Suspension 1500 milliGRAM(s) Oral daily  chlorhexidine 2% Cloths 1 Application(s) Topical daily  cholecalciferol 1000 Unit(s) Oral daily  CRRT Treatment    <Continuous>  dexMEDEtomidine Infusion 0.5 MICROgram(s)/kG/Hr (11.3 mL/Hr) IV Continuous <Continuous>  heparin  Infusion 1000 Unit(s)/Hr (10 mL/Hr) IV Continuous <Continuous>  hydrocortisone sodium succinate Injectable 75 milliGRAM(s) IV Push every 8 hours  insulin lispro (ADMELOG) corrective regimen sliding scale   SubCutaneous every 6 hours  nystatin    Suspension 866118 Unit(s) Oral four times a day  pantoprazole  Injectable 40 milliGRAM(s) IV Push two times a day  Phoxillum Filtration BK 4 / 2.5 5000 milliLiter(s) (1000 mL/Hr) CRRT <Continuous>  piperacillin/tazobactam IVPB.. 4.5 Gram(s) IV Intermittent every 8 hours  polyethylene glycol 3350 17 Gram(s) Oral daily  PrismaSATE Dialysate BGK 4 / 2.5 5000 milliLiter(s) (1200 mL/Hr) CRRT <Continuous>  PrismaSOL Filtration BGK 0 / 2.5 5000 milliLiter(s) (200 mL/Hr) CRRT <Continuous>  senna 2 Tablet(s) Oral daily  tacrolimus    0.5 mG/mL Suspension 2.5 milliGRAM(s) Oral <User Schedule>  valGANciclovir 50 mG/mL Oral Solution 450 milliGRAM(s) Oral <User Schedule>    MEDICATIONS  (PRN):

## 2025-02-28 NOTE — PROGRESS NOTE ADULT - SUBJECTIVE AND OBJECTIVE BOX
Follow Up:  Bacteremia    Interval History/ROS: afebrile, gradually worsening leucocytosis, remains on Precedex and CRRT.    Allergies  No Known Allergies        ANTIMICROBIALS:  atovaquone  Suspension 1500 daily  nystatin    Suspension 414582 four times a day  piperacillin/tazobactam IVPB.. 4.5 every 8 hours  valGANciclovir 50 mG/mL Oral Solution 450 <User Schedule>      OTHER MEDS:  MEDICATIONS  (STANDING):  aspirin  chewable 81 daily  atorvastatin 10 at bedtime  dexMEDEtomidine Infusion 0.5 <Continuous>  heparin  Infusion 1000 <Continuous>  hydrocortisone sodium succinate Injectable 75 every 8 hours  insulin lispro (ADMELOG) corrective regimen sliding scale  every 6 hours  LORazepam   Injectable 1 once  pantoprazole  Injectable 40 two times a day  polyethylene glycol 3350 17 daily  senna 2 daily  tacrolimus    0.5 mG/mL Suspension 2.5 <User Schedule>      Vital Signs Last 24 Hrs  T(C): 37.1 (28 Feb 2025 11:00), Max: 37.8 (28 Feb 2025 07:00)  T(F): 98.7 (28 Feb 2025 11:00), Max: 100 (28 Feb 2025 07:00)  HR: 114 (28 Feb 2025 11:15) (93 - 141)  BP: 150/98 (28 Feb 2025 11:15) (112/66 - 163/68)  BP(mean): 115 (28 Feb 2025 11:15) (82 - 115)  RR: 24 (28 Feb 2025 11:15) (16 - 32)  SpO2: 98% (28 Feb 2025 11:15) (93% - 100%)    Parameters below as of 28 Feb 2025 11:00  Patient On (Oxygen Delivery Method): room air        PHYSICAL EXAM:  General: Patient in NAD  HEENT: NCAT, NGT in place  CV: S1+S2, no m/r/g appreciated   Lungs: No respiratory distress, CTA anteriorly  Abd: Soft, nontender, no guarding, no rebound tenderness, + bowel sounds   : No suprapubic tenderness  Neuro: Somnolent, Moves all extremities against gravity.  Ext: No cyanosis, no edema, LUE arm blistering dressed , left groin HD cath+  Skin: erythematous papule at the right sternoclavicular border - no drainage or tenderness                                  8.0    14.63 )-----------( 170      ( 28 Feb 2025 00:41 )             25.4       02-28    136  |  102  |  20  ----------------------------<  107[H]  4.8   |  22  |  1.64[H]    Ca    7.9[L]      28 Feb 2025 06:24  Phos  2.9     02-28  Mg     2.6     02-28    TPro  5.4[L]  /  Alb  2.5[L]  /  TBili  1.1  /  DBili  x   /  AST  32  /  ALT  13  /  AlkPhos  51  02-28      Urinalysis Basic - ( 28 Feb 2025 06:24 )    Color: x / Appearance: x / SG: x / pH: x  Gluc: 107 mg/dL / Ketone: x  / Bili: x / Urobili: x   Blood: x / Protein: x / Nitrite: x   Leuk Esterase: x / RBC: x / WBC x   Sq Epi: x / Non Sq Epi: x / Bacteria: x        MICROBIOLOGY:  v    Culture - Blood (collected 25 Feb 2025 11:40)  Source: .Blood Blood-Peripheral  Preliminary Report (27 Feb 2025 16:01):    No growth at 48 Hours    Culture - Blood (collected 25 Feb 2025 11:35)  Source: .Blood Blood-Peripheral  Preliminary Report (27 Feb 2025 16:01):    No growth at 48 Hours    Culture - Blood (collected 24 Feb 2025 00:35)  Source: .Blood Blood-Peripheral  Preliminary Report (28 Feb 2025 03:00):    No growth at 4 days    Culture - Blood (collected 24 Feb 2025 00:20)  Source: .Blood Blood-Peripheral  Preliminary Report (28 Feb 2025 03:00):    No growth at 4 days            Toxoplasma IgG Screen: 10.70 IU/mL (02-21-25 @ 09:38)    CMVPCR Log: NotDetec [id number]/mL (02-22 @ 07:33)        RADIOLOGY:   Follow Up:  Bacteremia    Interval History/ROS: afebrile, gradually worsening leucocytosis, remains on Precedex and CRRT.    Allergies  No Known Allergies        ANTIMICROBIALS:  atovaquone  Suspension 1500 daily  nystatin    Suspension 422883 four times a day  piperacillin/tazobactam IVPB.. 4.5 every 8 hours  valGANciclovir 50 mG/mL Oral Solution 450 <User Schedule>      OTHER MEDS:  MEDICATIONS  (STANDING):  aspirin  chewable 81 daily  atorvastatin 10 at bedtime  dexMEDEtomidine Infusion 0.5 <Continuous>  heparin  Infusion 1000 <Continuous>  hydrocortisone sodium succinate Injectable 75 every 8 hours  insulin lispro (ADMELOG) corrective regimen sliding scale  every 6 hours  LORazepam   Injectable 1 once  pantoprazole  Injectable 40 two times a day  polyethylene glycol 3350 17 daily  senna 2 daily  tacrolimus    0.5 mG/mL Suspension 2.5 <User Schedule>      Vital Signs Last 24 Hrs  T(C): 37.1 (28 Feb 2025 11:00), Max: 37.8 (28 Feb 2025 07:00)  T(F): 98.7 (28 Feb 2025 11:00), Max: 100 (28 Feb 2025 07:00)  HR: 114 (28 Feb 2025 11:15) (93 - 141)  BP: 150/98 (28 Feb 2025 11:15) (112/66 - 163/68)  BP(mean): 115 (28 Feb 2025 11:15) (82 - 115)  RR: 24 (28 Feb 2025 11:15) (16 - 32)  SpO2: 98% (28 Feb 2025 11:15) (93% - 100%)    Parameters below as of 28 Feb 2025 11:00  Patient On (Oxygen Delivery Method): room air        PHYSICAL EXAM:  General: Patient in NAD  HEENT: NCAT, NGT in place  CV: S1+S2, no m/r/g appreciated   Lungs: No respiratory distress, CTA anteriorly  Abd: Soft, nontender, no guarding, no rebound tenderness, + bowel sounds   : No suprapubic tenderness  Neuro: Somnolent, Moves all extremities against gravity.  Ext: No cyanosis, no edema, LUE arm blistering dressed , left groin HD cath+  Skin: erythematous papule at the right sternoclavicular border - no drainage or tenderness                                  8.0    14.63 )-----------( 170      ( 28 Feb 2025 00:41 )             25.4       02-28    136  |  102  |  20  ----------------------------<  107[H]  4.8   |  22  |  1.64[H]    Ca    7.9[L]      28 Feb 2025 06:24  Phos  2.9     02-28  Mg     2.6     02-28    TPro  5.4[L]  /  Alb  2.5[L]  /  TBili  1.1  /  DBili  x   /  AST  32  /  ALT  13  /  AlkPhos  51  02-28      Urinalysis Basic - ( 28 Feb 2025 06:24 )    Color: x / Appearance: x / SG: x / pH: x  Gluc: 107 mg/dL / Ketone: x  / Bili: x / Urobili: x   Blood: x / Protein: x / Nitrite: x   Leuk Esterase: x / RBC: x / WBC x   Sq Epi: x / Non Sq Epi: x / Bacteria: x        MICROBIOLOGY:  v    Culture - Blood (collected 25 Feb 2025 11:40)  Source: .Blood Blood-Peripheral  Preliminary Report (27 Feb 2025 16:01):    No growth at 48 Hours    Culture - Blood (collected 25 Feb 2025 11:35)  Source: .Blood Blood-Peripheral  Preliminary Report (27 Feb 2025 16:01):    No growth at 48 Hours    Culture - Blood (collected 24 Feb 2025 00:35)  Source: .Blood Blood-Peripheral  Preliminary Report (28 Feb 2025 03:00):    No growth at 4 days    Culture - Blood (collected 24 Feb 2025 00:20)  Source: .Blood Blood-Peripheral  Preliminary Report (28 Feb 2025 03:00):    No growth at 4 days            Toxoplasma IgG Screen: 10.70 IU/mL (02-21-25 @ 09:38)    CMVPCR Log: NotDetec [id number]/mL (02-22 @ 07:33)        RADIOLOGY:       Follow Up:  Bacteremia    Interval History/ROS: afebrile, gradually worsening leucocytosis, remains on Precedex and CRRT.    Allergies  No Known Allergies        ANTIMICROBIALS:  atovaquone  Suspension 1500 daily  nystatin    Suspension 951983 four times a day  piperacillin/tazobactam IVPB.. 4.5 every 8 hours  valGANciclovir 50 mG/mL Oral Solution 450 <User Schedule>      OTHER MEDS:  MEDICATIONS  (STANDING):  aspirin  chewable 81 daily  atorvastatin 10 at bedtime  dexMEDEtomidine Infusion 0.5 <Continuous>  heparin  Infusion 1000 <Continuous>  hydrocortisone sodium succinate Injectable 75 every 8 hours  insulin lispro (ADMELOG) corrective regimen sliding scale  every 6 hours  LORazepam   Injectable 1 once  pantoprazole  Injectable 40 two times a day  polyethylene glycol 3350 17 daily  senna 2 daily  tacrolimus    0.5 mG/mL Suspension 2.5 <User Schedule>      Vital Signs Last 24 Hrs  T(C): 37.1 (28 Feb 2025 11:00), Max: 37.8 (28 Feb 2025 07:00)  T(F): 98.7 (28 Feb 2025 11:00), Max: 100 (28 Feb 2025 07:00)  HR: 114 (28 Feb 2025 11:15) (93 - 141)  BP: 150/98 (28 Feb 2025 11:15) (112/66 - 163/68)  BP(mean): 115 (28 Feb 2025 11:15) (82 - 115)  RR: 24 (28 Feb 2025 11:15) (16 - 32)  SpO2: 98% (28 Feb 2025 11:15) (93% - 100%)    Parameters below as of 28 Feb 2025 11:00  Patient On (Oxygen Delivery Method): room air        PHYSICAL EXAM:  General: Patient in NAD  HEENT: NCAT, NGT in place  CV: S1+S2, no m/r/g appreciated   Lungs: No respiratory distress, CTA anteriorly  Abd: Soft, nontender, no guarding, no rebound tenderness, + bowel sounds   : No suprapubic tenderness  Neuro: Somnolent, Moves all extremities against gravity.  Ext: No cyanosis, no edema, LUE arm blistering dressed , left groin HD cath+  Skin: erythematous papule at the right sternoclavicular border - no drainage or tenderness                                  8.0    14.63 )-----------( 170      ( 28 Feb 2025 00:41 )             25.4       02-28    136  |  102  |  20  ----------------------------<  107[H]  4.8   |  22  |  1.64[H]    Ca    7.9[L]      28 Feb 2025 06:24  Phos  2.9     02-28  Mg     2.6     02-28    TPro  5.4[L]  /  Alb  2.5[L]  /  TBili  1.1  /  DBili  x   /  AST  32  /  ALT  13  /  AlkPhos  51  02-28      Urinalysis Basic - ( 28 Feb 2025 06:24 )    Color: x / Appearance: x / SG: x / pH: x  Gluc: 107 mg/dL / Ketone: x  / Bili: x / Urobili: x   Blood: x / Protein: x / Nitrite: x   Leuk Esterase: x / RBC: x / WBC x   Sq Epi: x / Non Sq Epi: x / Bacteria: x        MICROBIOLOGY:  v    Culture - Blood (collected 25 Feb 2025 11:40)  Source: .Blood Blood-Peripheral  Preliminary Report (27 Feb 2025 16:01):    No growth at 48 Hours    Culture - Blood (collected 25 Feb 2025 11:35)  Source: .Blood Blood-Peripheral  Preliminary Report (27 Feb 2025 16:01):    No growth at 48 Hours    Culture - Blood (collected 24 Feb 2025 00:35)  Source: .Blood Blood-Peripheral  Preliminary Report (28 Feb 2025 03:00):    No growth at 4 days    Culture - Blood (collected 24 Feb 2025 00:20)  Source: .Blood Blood-Peripheral  Preliminary Report (28 Feb 2025 03:00):    No growth at 4 days            Toxoplasma IgG Screen: 10.70 IU/mL (02-21-25 @ 09:38)    CMVPCR Log: NotDetec [id number]/mL (02-22 @ 07:33)        RADIOLOGY:    <The imaging below has been reviewed and visualized by me independently. Findings as detailed in report below>    < from: MR Chest w/wo IV Cont (02.28.25 @ 13:19) >  IMPRESSION:  1.  Sternal wires are present without osseous fusion.  2.  Marrow appears preserved given the metallic artifact without evidence   for osteomyelitis.  3.  Sternoclavicular joints appear preserved.  4.  Edema of the right pectoralis along the manubrium and clavicle with   surrounding soft tissue edema. Changes may reflect cellulitis and   myositis with the given history versus injury.  5.  No abscess is seen.    < end of copied text >

## 2025-02-28 NOTE — PROGRESS NOTE ADULT - ATTENDING COMMENTS
I have seen this patient with the fellow and agree with their assessment and plan. I was physically present for significant portions of the evaluation and management (E/M) service provided.  I agree with the above history, physical, and plan which I have reviewed and edited where appropriate.    Sarkis Hutchins MD  Office   Contact me directly via [employer] Teams     (After 5 pm or on weekends please page the on-call fellow/attending, can check [email or web address] for schedule. Login is [email or web address] [email or web address], schedule under [facility] medicine, psych, derm)

## 2025-02-28 NOTE — PROGRESS NOTE ADULT - ASSESSMENT
====================ASSESSMENT ==============  75yo M w/ pmhx HTN, HLD, nonischemic cardiomyopathy, chronic systolic heart failure s/p HM2 LVAD (6/2017), s/p heart transplant from Hep. 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 Found to have septic shock, (2/19 BCx pseudo, 2/22 BCx NGTD). Still episodes of hypotension. Worsening NORMAN. Accepted to MICU for CRRT.    Plan:  ====================== NEUROLOGY=====================  # Metabolic encephalopathy.  - Likely multi-factorial etiologies: septic vs uremic vs hypercapnic, less likely meningitis  - Patient baseline AOx3. AOx1 at ED arrival, iso of infection vs metabolic derangements (uremic encephelopathy)  - CTH and angio w/o hemorrhage or stenosis   - TSH wnl / B12, CK 2200  - Fall precautions  - spot EEG 2/21: negative for seizures   - Currently A&O xO    Plan  - Monitor MS w/ abx and CRRT  - Neurology consulted     dexMEDEtomidine Infusion 0.5 MICROgram(s)/kG/Hr IV Continuous <Continuous>  fentaNYL    Injectable 25 MICROGram(s) IV Push once    ==================== RESPIRATORY======================  #Metabolic acidosis  - respiratory and metabolic acidosis VpH 7.17, PCO2 57 on admission  - pH improving, likely as respiratory component has improved  - Remains on room air    ====================CARDIOVASCULAR==================  #Septic shock from pseudomonas bacteremia  - POCUS reveals no evidence of  fluid overload  - Pt is also likely intravascular depleted with ileus and poor po intake.   - S/p IVF  - Patient volume up hold off IVF, now running net even on CRRT  - Pressors: s/p levo, off 2/24    Plan  - F/u transplant ID recs - CMV ordered    # H/O heart transplant.   - Nonischemic cardiomyopathy, chronic systolic heart failure s/p HM2 LVAD (6/2017), s/p heart transplant from Hep. C donor (treated) 2/23/18   - Home Tacrolimus dose 2mg BID, Pred 15mg qd  - Intolerant of Cellcept due to leukopenia.  - Continue with home Pred 15mg qd  - Hold home Bactrim given hyperkalemia, now on Atovaquone for ppx  - ASA  - c/w home Atorvastatin (therapeutic interchange)   - HOLD home Metoprolol from 200mg with hold parameters due to hypotension (02/23)  - Tacro level daily : home regimen 2mg BID, c/w 0.8mg BID-> will adjust by level today     # Paroxysmal atrial fibrillation.   - Home regimen : Eliquis 5 mg PO BID at home for hx of of afib and IJ thrombi  - EKG on admission - nsr   - [id number]  = 3  - Last Dose : 2/20 AM   - Currently on HSQ    # Hypertension (chronic)  - Hold home Toprol  mg PO QD ISO hypotension  - Hold home Losartan 75 mg PO QD due to NORMAN & hypotension.    # Vascular disorder of lower extremity.   - Extremities appear cool and dry   - c/w Local wound care by podiatry   - Podiatry recommendations - pending SHANEKA studies, consider vascular evaluation if abnormal.    ===================HEMATOLOGIC/ONC ===================  # Hx of hemolytic anemia  - hx of hemolytic anemia, follows with Dr. Rosario as outpatient  - c/w Prednisone 15 mg PO QD   - f/u hematology/oncology recs  - Monitor H&H daily.  - f/u hemolysis labs and peripheral smear    # DVT ppx  - restart heparin sq  - SCD    aspirin  chewable 81 milliGRAM(s) Oral daily    ===================== RENAL =========================  NORMAN on CKD  ATN 2/2 septic shock  - Known hx of CKD, Cr now uptrending  - Scr ranged from 1.3-2.1 in 2023. Most recent Scr was 1.75 on 1/30/24. On admission, Scr was 1.9 and is worsening now. UA showed trace ketones.   - No improvement with IVF, likely ATN i/s/o contrast/medications.   - S/p double lumen L fem catheter  - CRRT net even  - c/w renvela for hyperphosphatemia  - CRRT as per nephro    #Hyperkalemia (resolved)   - K 6.2 at ED arrival  - shifted in the ED, no EKG changes consistent with severe hyper K. Cr not significantly elevated form baseline, c/f RTA from bactrim and Tacro. Stable   - s/p Lokelma 10mg BID for 3 days  - f/u Tacro level daily   - Hold home bactrim - ID consult regarding Atovaquone   - c/w monitoring patient on Tele.  - CRRT as above    ==================== GASTROINTESTINAL===================  #Ileus  #+/- coffee ground emesis  - Ileus could potentially be source of infx  - 1 episode of coffee ground emesis, unclear if true GIB  - Hb stable   - CTAP (2/22) ileus vs partial SBO - cannot r/o GI bleed  - NGT placed set to LIS  - monitor for further episodes  - c/w PPI IV 40 BID   - c/w trending CBC Q12H  - bowel regimen: senna, miralax  - Restart DVT Ppx/ASA for now; if no more GIB and HgB stable  - if repeat episode, GI consult   - If repeat episode or worsening lactate, CT angiogram with venous phase.    pantoprazole  Injectable 40 milliGRAM(s) IV Push two times a day  polyethylene glycol 3350 17 Gram(s) Oral daily  senna 2 Tablet(s) Oral daily    =======================    ENDOCRINE  =====================  # Diabetes type 2.   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 No manifest signs of bowel ischemia. Interval worsening of right lower lobe and right middle lobe atelectasis secondary to elevated right hemidiaphragm when compared to prior CT dated 7/11/2023. Superimposed infection in the collapsed lungs is not excluded  - s/p meropenem (2/21-2/24)  - Hold off additional doses of Vancomycin (MRSA swab negative)  - c/w home Valganciclovir for ppx renally dosed   - f/u infectious labs - Toxoplasma, EBV, Fungitell, Galactomannan, CMV, Cryptococcus, MRSA, ASCENCION virus, CMV, Crypto, ASCENCION virus  - f/u GI PCR, consider C. Diff testing   - Transplant ID recommendations   - Consider LP if MS not improving  - Warm blanket       ====================ASSESSMENT ==============  75yo M w/ pmhx HTN, HLD, nonischemic cardiomyopathy, chronic systolic heart failure s/p HM2 LVAD (6/2017), s/p heart transplant from Hep. 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 Found to have septic shock, (2/19 BCx pseudo, 2/22 BCx NGTD). Still episodes of hypotension. Worsening NORMAN. Accepted to MICU for CRRT.    Plan:  ====================== NEUROLOGY=====================  # Metabolic encephalopathy.  - Likely multi-factorial etiologies: septic vs uremic vs hypercapnic, less likely meningitis  - Patient baseline AOx3. AOx1 at ED arrival, iso of infection vs metabolic derangements (uremic encephelopathy)  - CTH and angio w/o hemorrhage or stenosis   - TSH wnl / B12, CK 2200  - Fall precautions  - spot EEG 2/21: negative for seizures   - Currently A&O xO  - s/p seroquel 25mg x1 (2/27 overnight)    Plan  - Monitor MS w/ abx and CRRT  - Neurology consulted  - c/w precedex gtt 1.0    dexMEDEtomidine Infusion 0.5 MICROgram(s)/kG/Hr IV Continuous <Continuous>  fentaNYL    Injectable 25 MICROGram(s) IV Push once    ==================== RESPIRATORY======================  #Metabolic acidosis  - respiratory and metabolic acidosis VpH 7.17, PCO2 57 on admission  - pH improving, likely as respiratory component has improved  - Remains on room air    ====================CARDIOVASCULAR==================  #Septic shock from pseudomonas bacteremia  - POCUS reveals no evidence of  fluid overload  - Pt is also likely intravascular depleted with ileus and poor po intake.   - S/p IVF  - Patient volume up hold off IVF, now running net even on CRRT  - s/p levo, off 2/24    Plan  - F/u transplant ID recs - CMV ordered    # H/O heart transplant.   - Nonischemic cardiomyopathy, chronic systolic heart failure s/p HM2 LVAD (6/2017), s/p heart transplant from Hep. C donor (treated) 2/23/18   - Home Tacrolimus dose 2mg BID, Pred 15mg qd  - Intolerant of Cellcept due to leukopenia.  - Continue with home Pred 15mg qd  - Hold home Bactrim given hyperkalemia, now on Atovaquone for ppx  - ASA  - c/w home Atorvastatin (therapeutic interchange)   - HOLD home Metoprolol from 200mg with hold parameters due to hypotension (02/23)  - Tacro level daily : home regimen 2mg BID, c/w 0.8mg BID-> will adjust by level today     # Paroxysmal atrial fibrillation.   - Home regimen : Eliquis 5 mg PO BID at home for hx of of afib and IJ thrombi  - EKG on admission - nsr   - [id number]  = 3  - Last Dose : 2/20 AM   - Eliquis held early in week for possible LP and episode coffee ground emesis -> no current plan for LP, no recurrent emesis or significant NG tube output x several days  - Will starttherapeutic heparin gtt for AC    # Hypertension (chronic)  - Hold home Toprol  mg PO QD ISO hypotension  - Hold home Losartan 75 mg PO QD due to NORMAN & hypotension.    # Vascular disorder of lower extremity.   - Extremities appear cool and dry   - c/w Local wound care by podiatry   - Podiatry recommendations - pending SHANEKA studies, consider vascular evaluation if abnormal.    ===================HEMATOLOGIC/ONC ===================  # Hx of hemolytic anemia, Sanchez Syndrome  - hx of hemolytic anemia, follows with Dr. Rosario as outpatient  - hold Prednisone 15 mg PO QD --> switched to Solu-cortef 75mg IV q8hr   - f/u hematology/oncology recs  - Monitor H&H daily.  - f/u hemolysis labs and peripheral smear    # DVT ppx  - therapeutic heparin gtt for Afib    aspirin  chewable 81 milliGRAM(s) Oral daily    ===================== RENAL =========================  NORMAN on CKD  ATN 2/2 septic shock  - Known hx of CKD, Cr now uptrending  - Scr ranged from 1.3-2.1 in 2023. Most recent Scr was 1.75 on 1/30/24. On admission, Scr was 1.9 and is worsening now. UA showed trace ketones.   - No improvement with IVF, likely ATN i/s/o contrast/medications.   - S/p double lumen L fem catheter  - CRRT net even  - c/w renvela for hyperphosphatemia  - CRRT as per nephro    #Hyperkalemia (resolved)   - K 6.2 at ED arrival  - shifted in the ED, no EKG changes consistent with severe hyper K. Cr not significantly elevated form baseline, c/f RTA from bactrim and Tacro. Stable   - s/p Lokelma 10mg BID for 3 days  - f/u Tacro level daily   - Hold home bactrim - ID consult regarding Atovaquone   - c/w monitoring patient on Tele.  - CRRT as above    ==================== GASTROINTESTINAL===================  #Ileus  #+/- coffee ground emesis  - Ileus could potentially be source of infx  - 1 episode of coffee ground emesis, unclear if true GIB  - Hb stable   - CTAP (2/22) ileus vs partial SBO - cannot r/o GI bleed  - NGT placed set to LIS -> minimal output x several days  - monitor for further episodes  - c/w PPI IV 40 BID   - c/w trending CBC Q12H  - bowel regimen: senna, miralax  - Restart DVT Ppx/ASA and heparin gtt for full AC for now; if no more GIB and HgB stable  - if repeat episode, GI consult   - If repeat episode or worsening lactate, CT angiogram with venous phase.  - 2/28 - trial trickle tube feeds    pantoprazole  Injectable 40 milliGRAM(s) IV Push two times a day  polyethylene glycol 3350 17 Gram(s) Oral daily  senna 2 Tablet(s) Oral daily    =======================    ENDOCRINE  =====================  # Diabetes type 2.   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 No manifest signs of bowel ischemia. Interval worsening of right lower lobe and right middle lobe atelectasis secondary to elevated right hemidiaphragm when compared to prior CT dated 7/11/2023. Superimposed infection in the collapsed lungs is not excluded  - s/p meropenem (2/21-2/24)  - Hold off additional doses of Vancomycin (MRSA swab negative)  - c/w home Valganciclovir for ppx renally dosed   - f/u infectious labs - Toxoplasma, EBV, Fungitell, Galactomannan, CMV, Cryptococcus, MRSA, ASCENCION virus, CMV, Crypto, ASCENCION virus  - f/u GI PCR, consider C. Diff testing   - Transplant ID recommendations   - Consider LP if MS not improving  - Warm blanket

## 2025-02-28 NOTE — BH CONSULTATION LIAISON ASSESSMENT NOTE - RISK ASSESSMENT
How Severe Is Your Rosacea?: moderate Is This A New Presentation, Or A Follow-Up?: Rash low, denies si/hi

## 2025-02-28 NOTE — BH CONSULTATION LIAISON ASSESSMENT NOTE - SUMMARY
Pt is a 73 year old  man PMH HTN, HLD, CKD, nonischemic cardiomyopathy, chronic systolic heart failure s/p HM2 LVAD (2017), s/p heart transplant from Hep. 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  Pt had been agitated intermittently and had received several doses of Ativan and Haldol 5mg in the past two days.  He did have a QTC on 495ms on ECG.  Pt was sedated when seen so most of the history was obtained from his  friend and caretaker at bedside.  She stated that pt was retired and had been  since  when his wife passed away from lung CA.  His only son  in  from a gun shot and pt did become increasingly depressed and was in poor health.  He went into heart failure and required a heart transplant in 2018.  Pt has been living at home with the help of a home health aid 8 hours per day for 7 days a week and his two friends Tahira Green and Nawaf Barahona have been helping him.  He has no past history of any suicidality and no history of alcohol or other substance use.  He is a former smoker.  He has no history of acute psychiatric symptoms.  He has been confused and disoriented since his illness and hospitalization.   Impression:  Delirium   Recommendations:  Pt does have an increased QTC (was 495ms) so would not recommend Haldol  May continue Ativan 1mg po/IV q6hrs prn anxiety   Consider Seroquel 25mg po q6pm is pt is able to take po but hold if QTC is greater than 500ms

## 2025-02-28 NOTE — PROGRESS NOTE ADULT - CRITICAL CARE ATTENDING COMMENT
meds: pressedex, mepron, nystatin, valcyte 450 QD, asa, s/c heparin, statin, prograf 1.75 bid (2.8), solucortef Q8, zocyn.   Last CVP 6, -120, /-150/ Temp 100,   I/O: even with CRT.   02-28    136  |  102  |  20  ----------------------------<  107[H]  4.8   |  22  |  1.64[H]    Ca    7.9[L]      28 Feb 2025 06:24  Phos  2.9     02-28  Mg     2.6     02-28    TPro  5.4[L]  /  Alb  2.5[L]  /  TBili  1.1  /  DBili  x   /  AST  32  /  ALT  13  /  AlkPhos  51  02-28                        8.0    14.63 )-----------( 170      ( 28 Feb 2025 00:41 )             25.4   WBC 14., 12. 11.   Discussed with CICU team and Dr Loving  Plan:   For MRI of chest and aspiriation of pustule/collection as appropriate.  For IR guided LP.   Please ask transplant pych to help guide sedation regimens  Adjust prograf for goal 3-5   Check 12 lead EKG   CVVHD I=O  GOC dicussion with health care proxy, Tahira Campbell

## 2025-02-28 NOTE — PROGRESS NOTE ADULT - ASSESSMENT
73 year old male PMH HTN, HLD, CKD, nonischemic cardiomyopathy, chronic systolic heart failure s/p HM2 LVAD (6/2017), s/p heart transplant from Hep. 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 Superimposed infection in the collapsed lungs is not excluded.    CT abdomen pelvis (February 22) Since prior study 2/22/2025 interval development of diffusely dilated small bowel loops with air-fluid levels. Contrast from prior study has passed into the colon. Findings are favored to represent ileus or less likely partial small bowel obstruction rather than mechanical obstruction. No manifest signs of bowel ischemia.    At this point suspect that Pseudomonas bacteremia was secondary to either a respiratory source or gastrointestinal translocation.  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    CT chest/abdomen/pelvis 2/26    Bronchial mucus plugging with right lower lobe atelectasis.  No CT evidence of occult abscess in the abdomen or pelvis.      Antimicrobials   Zosyn 2/19/ 2/20  Meropenem 2/20-2/24  Zosyn 2/25-    #Pseudomonas Bacteremia, Encephalopathy, Heart Transplant Recipient, Prophylactic Antibiotic  # CKD with NORMAN on CRRT  --Continue Zosyn 3.375g IV Q8H  --Continue Atovaquone 1,500 mg PO Q24H for PCP PPx  -- f/u MRI chest to evaluate nodular lesion chest  --If encephalopathy persists and MRI brain findings not thought to be contributing to encephalopathy would recommend pursuing LP  --Continue Valcyte 450 mg M/W/F for CMV PPx  --Continue to follow CBC with diff  --Continue to follow transaminases  --Continue to follow temperature curve  --Follow up on preliminary blood cultures        All recommendations are tentative pending Attending Attestation.    Ignacio Sepulveda MD, PGY-5  ID Fellow  [employer] Teams Preferred  After 5pm/weekends call [phone]         73 year old male PMH HTN, HLD, CKD, nonischemic cardiomyopathy, chronic systolic heart failure s/p HM2 LVAD (6/2017), s/p heart transplant from Hep. 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 Superimposed infection in the collapsed lungs is not excluded.    CT abdomen pelvis (February 22) Since prior study 2/22/2025 interval development of diffusely dilated small bowel loops with air-fluid levels. Contrast from prior study has passed into the colon. Findings are favored to represent ileus or less likely partial small bowel obstruction rather than mechanical obstruction. No manifest signs of bowel ischemia.    At this point suspect that Pseudomonas bacteremia was secondary to either a respiratory source or gastrointestinal translocation.  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    CT chest/abdomen/pelvis 2/26  Bronchial mucus plugging with right lower lobe atelectasis.  No CT evidence of occult abscess in the abdomen or pelvis.    MR Chest 2/28    IMPRESSION:  1.  Sternal wires are present without osseous fusion.  2.  Marrow appears preserved given the metallic artifact without evidence   for osteomyelitis.  3.  Sternoclavicular joints appear preserved.  4.  Edema of the right pectoralis along the manubrium and clavicle with   surrounding soft tissue edema. Changes may reflect cellulitis and   myositis with the given history versus injury.  5.  No abscess is seen.      Antimicrobials  Zosyn 2/19/ 2/20  Meropenem 2/20-2/24  Zosyn 2/25-    # AMS  # Pseudomonas bacteremia  # NORMAN on CRRT  --Encephalopathy; Likely multifactorial  --Plan for LP to r/o infectious etiology  -- Please send Cell count, glucose, protein, routine gram stain and culture, CSF PCR panel, Fungal Culture  -- Switch Zosyn to Meropenem for empirical coverage  -- Continue to follow CBC with diff  -- Continue to follow transaminases  --Continue to follow temperature curve  --Follow up on preliminary blood cultures      # Nodular lesion along manubrium  -- MR with underlying cellulitis/myositis  -- Vancomycin added      # Heart Transplant Recipient, Prophylactic Antibiotic  --Continue Atovaquone 1,500 mg PO Q24H for PCP PPx  --Continue Valcyte 450 mg M/W/F for CMV PPx      Discussed with attending    Ignacio Sepulveda MD, PGY-5  ID Fellow  [employer] Teams Preferred  After 5pm/weekends call [phone]         73 year old male PMH HTN, HLD, CKD, nonischemic cardiomyopathy, chronic systolic heart failure s/p HM2 LVAD (6/2017), s/p heart transplant from Hep. 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 Superimposed infection in the collapsed lungs is not excluded.    CT abdomen pelvis (February 22) Since prior study 2/22/2025 interval development of diffusely dilated small bowel loops with air-fluid levels. Contrast from prior study has passed into the colon. Findings are favored to represent ileus or less likely partial small bowel obstruction rather than mechanical obstruction. No manifest signs of bowel ischemia.    At this point suspect that Pseudomonas bacteremia was secondary to either a respiratory source or gastrointestinal translocation.  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    CT chest/abdomen/pelvis 2/26  Bronchial mucus plugging with right lower lobe atelectasis.  No CT evidence of occult abscess in the abdomen or pelvis.    MR Chest 2/28    IMPRESSION:  1.  Sternal wires are present without osseous fusion.  2.  Marrow appears preserved given the metallic artifact without evidence   for osteomyelitis.  3.  Sternoclavicular joints appear preserved.  4.  Edema of the right pectoralis along the manubrium and clavicle with   surrounding soft tissue edema. Changes may reflect cellulitis and   myositis with the given history versus injury.  5.  No abscess is seen.      Antimicrobials  Zosyn 2/19/ 2/20  Meropenem 2/20-2/24  Zosyn 2/25-    # AMS  # Pseudomonas bacteremia  # NORMAN on CRRT  --Encephalopathy; Likely multifactorial  --Plan for LP to r/o infectious etiology  -- Please send Cell count, glucose, protein, routine gram stain and culture, CSF PCR panel, HSV 1/2 PCR, CMV PCR, JCV PCR, Toxoplasma PCR, Cryptococcal CSF Antigen, WNV PCR and WNV Serology  -- Switch Zosyn to Meropenem for empirical coverage and to reestablish CNS coverage  -- If there is delay in obtaining LP today would start Ganciclovir 2.5 mg/kg IV Q24H (if on CRRT) (For HSV 1/2 and CMV coverage)   --Will check repeat serum CMV PCR (last on 2/22 negative), Toxoplasma Serum PCR, HSV 1/2 Serum PCR, WNV Serology and PCR (albeit unlikely etiology)  -- Continue to follow CBC with diff  -- Continue to follow transaminases  --Continue to follow temperature curve  --Follow up on preliminary blood cultures    # Nodular lesion along manubrium  -- MR with underlying cellulitis/myositis  -- Add IV Vancomycin to coverage    # Heart Transplant Recipient, Prophylactic Antibiotic  --Continue Atovaquone 1,500 mg PO Q24H for PCP PPx  --Continue Valcyte 450 mg M/W/F for CMV PPx    Discussed with attending    Ignacio Sepulveda MD, PGY-5  ID Fellow  [employer] Teams Preferred  After 5pm/weekends call [phone]

## 2025-02-28 NOTE — PROGRESS NOTE ADULT - SUBJECTIVE AND OBJECTIVE BOX
[facility] Division of Kidney Diseases & Hypertension  FOLLOW UP NOTE  [phone]--------------------------------------------------------------------------------  Chief Complaint:Hyperkalemia, NORMAN on CKD.    24 hour events/subjective:  Pt was seen and examined earlier today. No acute overnight events. No fresh complaints. Pt remained on CRRT. Pt is responding to commands but is still AAO x2          PAST HISTORY  --------------------------------------------------------------------------------  No significant changes to PMH, PSH, FHx, SHx, unless otherwise noted    ALLERGIES & MEDICATIONS  --------------------------------------------------------------------------------  Allergies    No Known Allergies    Intolerances      Standing Inpatient Medications  aspirin  chewable 81 milliGRAM(s) Oral daily  atorvastatin 10 milliGRAM(s) Oral at bedtime  atovaquone  Suspension 1500 milliGRAM(s) Oral daily  chlorhexidine 2% Cloths 1 Application(s) Topical daily  cholecalciferol 1000 Unit(s) Oral daily  CRRT Treatment    <Continuous>  dexMEDEtomidine Infusion 0.5 MICROgram(s)/kG/Hr IV Continuous <Continuous>  heparin  Infusion 1000 Unit(s)/Hr IV Continuous <Continuous>  hydrocortisone sodium succinate Injectable 75 milliGRAM(s) IV Push every 8 hours  insulin lispro (ADMELOG) corrective regimen sliding scale   SubCutaneous every 6 hours  LORazepam   Injectable 1 milliGRAM(s) IV Push once  nystatin    Suspension 130066 Unit(s) Oral four times a day  pantoprazole  Injectable 40 milliGRAM(s) IV Push two times a day  Phoxillum Filtration BK 4 / 2.5 5000 milliLiter(s) CRRT <Continuous>  piperacillin/tazobactam IVPB.. 4.5 Gram(s) IV Intermittent every 8 hours  polyethylene glycol 3350 17 Gram(s) Oral daily  PrismaSATE Dialysate BGK 4 / 2.5 5000 milliLiter(s) CRRT <Continuous>  PrismaSOL Filtration BGK 0 / 2.5 5000 milliLiter(s) CRRT <Continuous>  senna 2 Tablet(s) Oral daily  tacrolimus    0.5 mG/mL Suspension 1.75 milliGRAM(s) Oral <User Schedule>  valGANciclovir 50 mG/mL Oral Solution 450 milliGRAM(s) Oral <User Schedule>    PRN Inpatient Medications      REVIEW OF SYSTEMS  --------------------------------------------------------------------------------  ROS is not obtainable due to pt's mental status.     VITALS/PHYSICAL EXAM  --------------------------------------------------------------------------------  T(C): 37.1 (02-28-25 @ 11:00), Max: 37.8 (02-28-25 @ 07:00)  HR: 114 (02-28-25 @ 11:15) (93 - 141)  BP: 150/98 (02-28-25 @ 11:15) (112/66 - 163/68)  RR: 24 (02-28-25 @ 11:15) (16 - 32)  SpO2: 98% (02-28-25 @ 11:15) (92% - 100%)  Wt(kg): --        02-27-25 @ 07:01  -  02-28-25 @ 07:00  --------------------------------------------------------  IN: 1253.1 mL / OUT: 1200 mL / NET: 53.1 mL    02-28-25 @ 07:01  -  02-28-25 @ 11:41  --------------------------------------------------------  IN: 212.1 mL / OUT: 150 mL / NET: 62.1 mL      Physical Exam:  Gen: NAD  Pulm: CTA B/L  CV: S1S2  Abd: Soft, +BS   Ext: No LE edema B/L  Neuro: AAO x1-2 - Interval improvement in mental status.   Skin: Warm and dry  Lfemoral non- tunnelled HD cath in place - being used for CRRT.     LABS/STUDIES  --------------------------------------------------------------------------------              8.0    14.63 >-----------<  170      [02-28-25 @ 00:41]              25.4     136  |  102  |  20  ----------------------------<  107      [02-28-25 @ 06:24]  4.8   |  22  |  1.64        Ca     7.9     [02-28-25 @ 06:24]      Mg     2.6     [02-28-25 @ 06:24]      Phos  2.9     [02-28-25 @ 06:24]    TPro  5.4  /  Alb  2.5  /  TBili  1.1  /  DBili  x   /  AST  32  /  ALT  13  /  AlkPhos  51  [02-28-25 @ 06:24]      PTT: 27.8       [02-28-25 @ 10:13]      Creatinine Trend:  SCr 1.64 [02-28 @ 06:24]  SCr 1.60 [02-28 @ 00:41]  SCr 1.63 [02-27 @ 17:23]  SCr 1.62 [02-27 @ 11:33]  SCr 1.62 [02-27 @ 06:42]

## 2025-02-28 NOTE — BH CONSULTATION LIAISON ASSESSMENT NOTE - NSBHCHARTREVIEWLAB_PSY_A_CORE FT
8.0    14.63 )-----------( 170      ( 28 Feb 2025 00:41 )             25.4   02-28    136  |  102  |  20  ----------------------------<  107[H]  4.8   |  22  |  1.64[H]    Ca    7.9[L]      28 Feb 2025 06:24  Phos  2.9     02-28  Mg     2.6     02-28    TPro  5.4[L]  /  Alb  2.5[L]  /  TBili  1.1  /  DBili  x   /  AST  32  /  ALT  13  /  AlkPhos  51  02-28

## 2025-02-28 NOTE — PROGRESS NOTE ADULT - ASSESSMENT
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 On exam, improved mentation despite getting sedatives  awake alert oriented x 2, following simple commands.     Impression: Improving AMS multifactorial causes likely sedative use, toxic, metabolic, infectious etiology, hospital delirium. Small right cerebellar stroke unlikely to be contributing to current mental status     Recommendations:  [] AC for afib if no contraindication   [] From secondary stroke prevention, if on AC does not need to be on aspirin, unless otherwise indicated for cardiac perspective   [] Taper sedation as tolerated  [] Continue Lipitor 10 mg daily     Seen with Dr. Tidwell.

## 2025-02-28 NOTE — PROGRESS NOTE ADULT - ATTENDING COMMENTS
Patient seen and examined at bedside.    I agree with the findings and plan as documented in the Resident's note except below.    Mr. 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 Neurology reconsulted for persistent altered mental status. Etiology of altered mental status is uncertain but perhaps due to the metabolic encephalopathy s/p sedative medication versus encephalitis given history of immunocompromised versus vascular etiology.  Clinically patient is improved, awake, Ox1 ( name ), follows simple commands and antigravity movements at all four extremities.     MRI brain showed small subacute lacunar infarct within the right cerebellar hemisphere/brachium pontis junction with associated cytotoxic edema.  Also T2/FLAIR sequence showed nonspecific abnormal white matter disease.  Continue anticoagulation if no contraindication for secondary stroke prevention.   Continue medical management, neuro- check and fall precaution.  GI and DVT prophylaxis.  PT and OT once patient is stable.     Patient is at high risk for complications and morbidity or mortality. There is high probability of imminent or life threatening deterioration in the patient's condition.  Patient is unable or incompetent to participate in giving a history and/or making decisions and discussion is necessary for determining treatment decisions.  My cumulative time taking care of this critically ill patient is 45 minutes.   If you have any further questions, please do not hesitate to contact our team.  Thank you for allowing us to participate in this patient care.

## 2025-02-28 NOTE — PROGRESS NOTE ADULT - SUBJECTIVE AND OBJECTIVE BOX
Neurology - Progress Note    -  Spectra: [id number] ([facility]), [id number] ([facility])  -    Interval History: Received precedex, ativan.       Review of Systems:   Unable to assess     Allergies:  No Known Allergies      PMHx/PSHx/Family Hx: As above, otherwise see below   CHF (Congestive Heart Failure)    HTN    SVT (Supraventricular Tachycardia)    Non-Ischemic Cardiomyopathy    PAF (paroxysmal atrial fibrillation)    Ventricular fibrillation    H/O prior ablation treatment    GIB (gastrointestinal bleeding)    Hepatitis C virus    DVT of upper extremity (deep vein thrombosis)    Former smoker    HLD (hyperlipidemia)    Knee pain, right    H/O autoimmune hemolytic anemia    H/O hemolytic anemia    Atrial fibrillation        Social Hx:  No current use of tobacco, alcohol, or illicit drugs      Medications:  MEDICATIONS  (STANDING):  aspirin  chewable 81 milliGRAM(s) Oral daily  atorvastatin 10 milliGRAM(s) Oral at bedtime  atovaquone  Suspension 1500 milliGRAM(s) Oral daily  chlorhexidine 2% Cloths 1 Application(s) Topical daily  cholecalciferol 1000 Unit(s) Oral daily  CRRT Treatment    <Continuous>  dexMEDEtomidine Infusion 0.5 MICROgram(s)/kG/Hr (11.3 mL/Hr) IV Continuous <Continuous>  heparin  Infusion 1000 Unit(s)/Hr (10 mL/Hr) IV Continuous <Continuous>  hydrocortisone sodium succinate Injectable 75 milliGRAM(s) IV Push every 8 hours  insulin lispro (ADMELOG) corrective regimen sliding scale   SubCutaneous every 6 hours  LORazepam   Injectable 1 milliGRAM(s) IV Push once  midazolam Injectable 1 milliGRAM(s) IV Push once  nystatin    Suspension 370986 Unit(s) Oral four times a day  pantoprazole  Injectable 40 milliGRAM(s) IV Push two times a day  Phoxillum Filtration BK 4 / 2.5 5000 milliLiter(s) (1000 mL/Hr) CRRT <Continuous>  piperacillin/tazobactam IVPB.. 4.5 Gram(s) IV Intermittent every 8 hours  polyethylene glycol 3350 17 Gram(s) Oral daily  PrismaSATE Dialysate BGK 4 / 2.5 5000 milliLiter(s) (1200 mL/Hr) CRRT <Continuous>  PrismaSOL Filtration BGK 0 / 2.5 5000 milliLiter(s) (200 mL/Hr) CRRT <Continuous>  senna 2 Tablet(s) Oral daily  tacrolimus    0.5 mG/mL Suspension 1.75 milliGRAM(s) Oral <User Schedule>  valGANciclovir 50 mG/mL Oral Solution 450 milliGRAM(s) Oral <User Schedule>    MEDICATIONS  (PRN):      Vitals:  T(C): 37.1 (02-28-25 @ 11:00), Max: 37.8 (02-28-25 @ 07:00)  HR: 114 (02-28-25 @ 11:15) (93 - 141)  BP: 150/98 (02-28-25 @ 11:15) (112/66 - 163/68)  RR: 24 (02-28-25 @ 11:15) (16 - 32)  SpO2: 98% (02-28-25 @ 11:15) (93% - 100%)    Physical Examination:   General - NAD, room air    Neurologic Exam:  Mental status - Awake, Alert oriented to name, place when given options says hospital, not to time, follows simple commands    Cranial nerves - PERRL,  eyes cross midline, face grossly symmetric  Motor -  Strength testing  Moving upper extremity antigravity, moving legs within plane of bed      Coordination - No tremors appreciated    Gait and station - Unable to assess     Labs:                        8.0    14.63 )-----------( 170      ( 28 Feb 2025 00:41 )             25.4     02-28    136  |  102  |  20  ----------------------------<  107[H]  4.8   |  22  |  1.64[H]    Ca    7.9[L]      28 Feb 2025 06:24  Phos  2.9     02-28  Mg     2.6     02-28    TPro  5.4[L]  /  Alb  2.5[L]  /  TBili  1.1  /  DBili  x   /  AST  32  /  ALT  13  /  AlkPhos  51  02-28    CAPILLARY BLOOD GLUCOSE      POCT Blood Glucose.: 109 mg/dL (28 Feb 2025 05:27)    LIVER FUNCTIONS - ( 28 Feb 2025 06:24 )  Alb: 2.5 g/dL / Pro: 5.4 g/dL / ALK PHOS: 51 U/L / ALT: 13 U/L / AST: 32 U/L / GGT: x             PT/INR - ( 28 Feb 2025 10:13 )   PT: 11.1 sec;   INR: 0.97 ratio         PTT - ( 28 Feb 2025 10:13 )  PTT:27.8 sec

## 2025-02-28 NOTE — BH CONSULTATION LIAISON ASSESSMENT NOTE - HPI (INCLUDE ILLNESS QUALITY, SEVERITY, DURATION, TIMING, CONTEXT, MODIFYING FACTORS, ASSOCIATED SIGNS AND SYMPTOMS)
Pt is a 73 year old  man PMH HTN, HLD, CKD, nonischemic cardiomyopathy, chronic systolic heart failure s/p HM2 LVAD (2017), s/p heart transplant from Hep. 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  Pt had been agitated intermittently and had received several doses of Ativan and Haldol 5mg in the past two days.  He did have a QTC on 495ms on ECG.  Pt was sedated when seen so most of the history was obtained from his  friend and caretaker at bedside.  She stated that pt was retired and had been  since  when his wife passed away from lung CA.  His only son  in  from a gun shot and pt did become increasingly depressed and was in poor health.  He went into heart failure and required a heart transplant in 2018.  Pt has been living at home with the help of a home health aid 8 hours per day for 7 days a week and his two friends Tahira Green and Nawaf Barahona have been helping him.  He has no past history of any suicidality and no history of alcohol or other substance use.  He is a former smoker.  He has no history of acute psychiatric symptoms.  He has been confused and disoriented since his illness and hospitalization.

## 2025-02-28 NOTE — PROGRESS NOTE ADULT - SUBJECTIVE AND OBJECTIVE BOX
ADVANCED HEART FAILURE & TRANSPLANT  - PROGRESS NOTE  *To reach the NS2 Team from 8am to 5pm (MON-FRI), please call [phone].   _______________________________________________________________________________________________________    Subjective:    Medications:  aspirin  chewable 81 milliGRAM(s) Oral daily  atorvastatin 10 milliGRAM(s) Oral at bedtime  atovaquone  Suspension 1500 milliGRAM(s) Oral daily  chlorhexidine 2% Cloths 1 Application(s) Topical daily  cholecalciferol 1000 Unit(s) Oral daily  CRRT Treatment    <Continuous>  dexMEDEtomidine Infusion 0.5 MICROgram(s)/kG/Hr IV Continuous <Continuous>  heparin   Injectable 5000 Unit(s) SubCutaneous every 8 hours  hydrocortisone sodium succinate Injectable 75 milliGRAM(s) IV Push every 8 hours  insulin lispro (ADMELOG) corrective regimen sliding scale   SubCutaneous every 6 hours  nystatin    Suspension 535026 Unit(s) Oral four times a day  pantoprazole  Injectable 40 milliGRAM(s) IV Push two times a day  Phoxillum Filtration BK 4 / 2.5 5000 milliLiter(s) CRRT <Continuous>  piperacillin/tazobactam IVPB.. 4.5 Gram(s) IV Intermittent every 8 hours  polyethylene glycol 3350 17 Gram(s) Oral daily  PrismaSATE Dialysate BGK 4 / 2.5 5000 milliLiter(s) CRRT <Continuous>  PrismaSOL Filtration BGK 0 / 2.5 5000 milliLiter(s) CRRT <Continuous>  senna 2 Tablet(s) Oral daily  tacrolimus    0.5 mG/mL Suspension 1.75 milliGRAM(s) Oral <User Schedule>  valGANciclovir 50 mG/mL Oral Solution 450 milliGRAM(s) Oral <User Schedule>      Physical Exam:    Vitals:  Vital Signs Last 24 Hours  T(C): 37.8 (02-28-25 @ 07:00), Max: 37.8 (02-28-25 @ 07:00)  HR: 105 (02-28-25 @ 07:00) (86 - 141)  BP: 149/67 (02-28-25 @ 07:00) (112/66 - 159/64)  RR: 25 (02-28-25 @ 07:00) (12 - 30)  SpO2: 98% (02-28-25 @ 07:00) (92% - 100%)        I&O's Summary    27 Feb 2025 07:01  -  28 Feb 2025 07:00  --------------------------------------------------------  IN: 1228.1 mL / OUT: 1200 mL / NET: 28.1 mL      Labs:                        8.0    14.63 )-----------( 170      ( 28 Feb 2025 00:41 )             25.4     02-28    136  |  102  |  20  ----------------------------<  107[H]  4.8   |  22  |  1.64[H]    Ca    7.9[L]      28 Feb 2025 06:24  Phos  2.9     02-28  Mg     2.6     02-28    TPro  5.4[L]  /  Alb  2.5[L]  /  TBili  1.1  /  DBili  x   /  AST  32  /  ALT  13  /  AlkPhos  51  02-28              Lactate Dehydrogenase, Serum: 322 U/L (02-25 @ 11:56)

## 2025-02-28 NOTE — PROGRESS NOTE ADULT - SUBJECTIVE AND OBJECTIVE BOX
Admission date:  CHIEF COMPLAINT:  HPI:  73yo M pmhx HTN, HLD, nonischemic cardiomyopathy, chronic systolic heart failure s/p HM2 LVAD (6/2017), s/p heart transplant from Hep. 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 On the phone, the patient's godbrother mentioned that on 2/18, the patient was in the car with his godbrother and was disoriented asking to get off on the road 4 blocks before his house. In addition, a caretaker stated that when she spoke to Mr. Hameed on the phone at 11am on 2/18, he was doing well. However, when the caretaker visited 2 hours later at 1pm, the patient was disoriented and felt his legs were heavy. This prompted the patient to come to the hospital.     In the ED: Hypothermic 91.4, HR 80, /60, RA   Patient noted to be Hyperkalemic (6.2) with Mixed respiratory and metabolic acidosis pH 7.2. - Patient was given Calcium Gluconate 2g, D50/Insulin 5 unit, 40 mg IV lasix, Lokelma 10mg.   Vancomycin and Zosyn  (20 Feb 2025 07:21)    INTERVAL HISTORY:    REVIEW OF SYSTEMS: AMS    MEDICATIONS  (STANDING):  aspirin  chewable 81 milliGRAM(s) Oral daily  atorvastatin 10 milliGRAM(s) Oral at bedtime  atovaquone  Suspension 1500 milliGRAM(s) Oral daily  chlorhexidine 2% Cloths 1 Application(s) Topical daily  cholecalciferol 1000 Unit(s) Oral daily  CRRT Treatment    <Continuous>  dexMEDEtomidine Infusion 0.5 MICROgram(s)/kG/Hr (11.3 mL/Hr) IV Continuous <Continuous>  heparin  Infusion 1000 Unit(s)/Hr (10 mL/Hr) IV Continuous <Continuous>  hydrocortisone sodium succinate Injectable 75 milliGRAM(s) IV Push every 8 hours  insulin lispro (ADMELOG) corrective regimen sliding scale   SubCutaneous every 6 hours  meropenem  IVPB 1000 milliGRAM(s) IV Intermittent every 8 hours  nystatin    Suspension 036222 Unit(s) Oral four times a day  pantoprazole  Injectable 40 milliGRAM(s) IV Push two times a day  Phoxillum Filtration BK 4 / 2.5 5000 milliLiter(s) (1000 mL/Hr) CRRT <Continuous>  polyethylene glycol 3350 17 Gram(s) Oral daily  PrismaSATE Dialysate BGK 4 / 2.5 5000 milliLiter(s) (1200 mL/Hr) CRRT <Continuous>  PrismaSOL Filtration BGK 0 / 2.5 5000 milliLiter(s) (200 mL/Hr) CRRT <Continuous>  QUEtiapine 50 milliGRAM(s) Oral once  senna 2 Tablet(s) Oral daily  tacrolimus    0.5 mG/mL Suspension 2.5 milliGRAM(s) Oral <User Schedule>  valGANciclovir 50 mG/mL Oral Solution 450 milliGRAM(s) Oral <User Schedule>  vancomycin  IVPB 750 milliGRAM(s) IV Intermittent every 12 hours    MEDICATIONS  (PRN):  LORazepam   Injectable 1 milliGRAM(s) IV Push every 6 hours PRN Anxiety/Agitation      Objective:  ICU Vital Signs Last 24 Hrs  T(C): 37.2 (28 Feb 2025 19:00), Max: 37.8 (28 Feb 2025 07:00)  T(F): 99 (28 Feb 2025 19:00), Max: 100 (28 Feb 2025 07:00)  HR: 126 (28 Feb 2025 21:00) (86 - 140)  BP: 144/66 (28 Feb 2025 21:00) (123/57 - 163/68)  BP(mean): 95 (28 Feb 2025 21:00) (82 - 115)  ABP: --  ABP(mean): --  RR: 25 (28 Feb 2025 21:00) (11 - 32)  SpO2: 100% (28 Feb 2025 21:00) (94% - 100%)    O2 Parameters below as of 28 Feb 2025 21:00  Patient On (Oxygen Delivery Method): room air                02-27 @ 07:01  -  02-28 @ 07:00  --------------------------------------------------------  IN: 1253.1 mL / OUT: 1200 mL / NET: 53.1 mL    02-28 @ 07:01  -  02-28 @ 21:54  --------------------------------------------------------  IN: 1063.7 mL / OUT: 559 mL / NET: 504.7 mL      Daily     Daily     PHYSICAL EXAMINATION:  General: Comfortable, no acute distress, cooperative with exam.  HEENT: Moist mucous membranes.  Respiratory: CTAB, normal respiratory effort, no coughing, wheezes, crackles, or rales.  CV: RRR, S1S2, no murmurs, rubs or gallops. No JVD. Distal pulses intact.  Abdominal: Soft, nontender, nondistended, no rebound or guarding, normal bowel sounds.  Neurology: AOx0  Extremities: No pitting edema, + Peripheral pulses.      TELEMETRY:     EKG:     IMAGING:    Labs:                          8.0    14.63 )-----------( 170      ( 28 Feb 2025 00:41 )             25.4     02-28    137  |  102  |  23  ----------------------------<  132[H]  4.6   |  21[L]  |  1.86[H]    Ca    7.6[L]      28 Feb 2025 18:52  Phos  3.0     02-28  Mg     2.5     02-28    TPro  5.4[L]  /  Alb  2.7[L]  /  TBili  1.0  /  DBili  x   /  AST  31  /  ALT  14  /  AlkPhos  52  02-28    LIVER FUNCTIONS - ( 28 Feb 2025 18:52 )  Alb: 2.7 g/dL / Pro: 5.4 g/dL / ALK PHOS: 52 U/L / ALT: 14 U/L / AST: 31 U/L / GGT: x           PT/INR - ( 28 Feb 2025 10:13 )   PT: 11.1 sec;   INR: 0.97 ratio         PTT - ( 28 Feb 2025 10:13 )  PTT:27.8 sec    Urinalysis Basic - ( 28 Feb 2025 18:52 )    Color: x / Appearance: x / SG: x / pH: x  Gluc: 132 mg/dL / Ketone: x  / Bili: x / Urobili: x   Blood: x / Protein: x / Nitrite: x   Leuk Esterase: x / RBC: x / WBC x   Sq Epi: x / Non Sq Epi: x / Bacteria: x        HEALTH ISSUES - PROBLEM Dx:  Acute respiratory acidosis    Hyperkalemia    Stage 3 chronic kidney disease    Metabolic encephalopathy    Systemic inflammatory response syndrome (SIRS)    Need for prophylactic measure    H/O heart transplant    AMS (altered mental status)    NORMAN (acute kidney injury)    Status post heart transplant    Immunosuppression    Prophylactic antibiotic    Deep vein thrombosis (DVT)    Hypertension    DM (diabetes mellitus)    Anemia, hemolytic    Swollen arm    Hemolytic anemia    Paroxysmal atrial fibrillation    Diabetes type 2    Acute kidney injury superimposed on CKD    Vascular disorder of lower extremity    Coffee ground emesis          ====================ASSESSMENT ==============  73yo M w/ pmhx HTN, HLD, nonischemic cardiomyopathy, chronic systolic heart failure s/p HM2 LVAD (6/2017), s/p heart transplant from Hep. 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 Found to have septic shock, (2/19 BCx pseudo, 2/22 BCx NGTD). Still episodes of hypotension. Worsening NORMAN. Accepted to MICU for CRRT.    Plan:  ====================== NEUROLOGY=====================  # Metabolic encephalopathy.  - Likely multi-factorial etiologies: septic vs uremic vs hypercapnic, less likely meningitis  - Patient baseline AOx3. AOx1 at ED arrival, iso of infection vs metabolic derangements (uremic encephelopathy)  - CTH and angio w/o hemorrhage or stenosis   - TSH wnl / B12, CK 2200  - Fall precautions  - spot EEG 2/21: negative for seizures   - Currently A&O xO    Plan  - Monitor MS w/ abx and CRRT  - Neurology consulted     dexMEDEtomidine Infusion 0.5 MICROgram(s)/kG/Hr IV Continuous <Continuous>  fentaNYL    Injectable 25 MICROGram(s) IV Push once    ==================== RESPIRATORY======================  #Metabolic acidosis  - respiratory and metabolic acidosis VpH 7.17, PCO2 57 on admission  - pH improving, likely as respiratory component has improved  - Remains on room air    ====================CARDIOVASCULAR==================  #Septic shock from pseudomonas bacteremia  - POCUS reveals no evidence of  fluid overload  - Pt is also likely intravascular depleted with ileus and poor po intake.   - S/p IVF  - Patient volume up hold off IVF, now running net even on CRRT  - Pressors: s/p levo, off 2/24    Plan  - F/u transplant ID recs - CMV ordered    # H/O heart transplant.   - Nonischemic cardiomyopathy, chronic systolic heart failure s/p HM2 LVAD (6/2017), s/p heart transplant from Hep. C donor (treated) 2/23/18   - Home Tacrolimus dose 2mg BID, Pred 15mg qd  - Intolerant of Cellcept due to leukopenia.  - Continue with home Pred 15mg qd  - Hold home Bactrim given hyperkalemia, now on Atovaquone for ppx  - ASA  - c/w home Atorvastatin (therapeutic interchange)   - HOLD home Metoprolol from 200mg with hold parameters due to hypotension (02/23)  - Tacro level daily : home regimen 2mg BID, c/w 0.8mg BID-> will adjust by level today     # Paroxysmal atrial fibrillation.   - Home regimen : Eliquis 5 mg PO BID at home for hx of of afib and IJ thrombi  - EKG on admission - nsr   - [id number]  = 3  - Last Dose : 2/20 AM   - Currently on HSQ    # Hypertension (chronic)  - Hold home Toprol  mg PO QD ISO hypotension  - Hold home Losartan 75 mg PO QD due to NORMAN & hypotension.    # Vascular disorder of lower extremity.   - Extremities appear cool and dry   - c/w Local wound care by podiatry   - Podiatry recommendations - pending SHANEKA studies, consider vascular evaluation if abnormal.    ===================HEMATOLOGIC/ONC ===================  # Hx of hemolytic anemia  - hx of hemolytic anemia, follows with Dr. Rosario as outpatient  - c/w Prednisone 15 mg PO QD   - f/u hematology/oncology recs  - Monitor H&H daily.  - f/u hemolysis labs and peripheral smear    # DVT ppx  - restart heparin sq  - SCD    aspirin  chewable 81 milliGRAM(s) Oral daily    ===================== RENAL =========================  NORMAN on CKD  ATN 2/2 septic shock  - Known hx of CKD, Cr now uptrending  - Scr ranged from 1.3-2.1 in 2023. Most recent Scr was 1.75 on 1/30/24. On admission, Scr was 1.9 and is worsening now. UA showed trace ketones.   - No improvement with IVF, likely ATN i/s/o contrast/medications.   - S/p double lumen L fem catheter  - CRRT net even  - c/w renvela for hyperphosphatemia  - CRRT as per nephro    #Hyperkalemia (resolved)   - K 6.2 at ED arrival  - shifted in the ED, no EKG changes consistent with severe hyper K. Cr not significantly elevated form baseline, c/f RTA from bactrim and Tacro. Stable   - s/p Lokelma 10mg BID for 3 days  - f/u Tacro level daily   - Hold home bactrim - ID consult regarding Atovaquone   - c/w monitoring patient on Tele.  - CRRT as above    ==================== GASTROINTESTINAL===================  #Ileus  #+/- coffee ground emesis  - Ileus could potentially be source of infx  - 1 episode of coffee ground emesis, unclear if true GIB  - Hb stable   - CTAP (2/22) ileus vs partial SBO - cannot r/o GI bleed  - NGT placed set to LIS  - monitor for further episodes  - c/w PPI IV 40 BID   - c/w trending CBC Q12H  - bowel regimen: senna, miralax  - Restart DVT Ppx/ASA for now; if no more GIB and HgB stable  - if repeat episode, GI consult   - If repeat episode or worsening lactate, CT angiogram with venous phase.    pantoprazole  Injectable 40 milliGRAM(s) IV Push two times a day  polyethylene glycol 3350 17 Gram(s) Oral daily  senna 2 Tablet(s) Oral daily    =======================    ENDOCRINE  =====================  # Diabetes type 2.   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 No manifest signs of bowel ischemia. Interval worsening of right lower lobe and right middle lobe atelectasis secondary to elevated right hemidiaphragm when compared to prior CT dated 7/11/2023. Superimposed infection in the collapsed lungs is not excluded  - s/p meropenem (2/21-2/24)  - Hold off additional doses of Vancomycin (MRSA swab negative)  - c/w home Valganciclovir for ppx renally dosed   - f/u infectious labs - Toxoplasma, EBV, Fungitell, Galactomannan, CMV, Cryptococcus, MRSA, ASCENCION virus, CMV, Crypto, ASCENCION virus  - f/u GI PCR, consider C. Diff testing   - Transplant ID recommendations   - Consider LP if MS not improving  - Warm blanket    tacrolimus    0.5 mG/mL Suspension 1 milliGRAM(s) Oral <User Schedule>    atovaquone  Suspension 1500 milliGRAM(s) Oral daily  nystatin    Suspension 917690 Unit(s) Oral four times a day  piperacillin/tazobactam IVPB.. 4.5 Gram(s) IV Intermittent every 8 hours  valGANciclovir 50 mG/mL Oral Solution 450 milliGRAM(s) Oral <User Schedule>    chlorhexidine 2% Cloths 1 Application(s) Topical daily    Patient requires continuous monitoring with bedside rhythm monitoring, pulse ox monitoring, and intermittent blood gas analysis. Care plan discussed with ICU care team. Patient remained critical and at risk for life threatening decompensation.  Patient seen, examined and plan discussed with CCU team during rounds.     I have personally provided __35__ minutes of critical care time excluding time spent on separate procedures, in addition to initial critical care time provided by the CICU Attending, Dr. Santiago.

## 2025-02-28 NOTE — PROGRESS NOTE ADULT - ATTENDING COMMENTS
73 year old male PMH HTN, HLD, CKD, nonischemic cardiomyopathy, chronic systolic heart failure s/p HM2 LVAD (6/2017), s/p heart transplant from Hep. 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     At this point suspect that Pseudomonas bacteremia was secondary to either a respiratory source or gastrointestinal translocation.  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    Repeat 2/26 CT chest abdomen pelvis without new infectious foci    He was noted to have a nodular and erythematous lesion draining hemorrhagic material on the right sternoclavicular border.  I have a lower suspicion this is the primary source for the Pseudomonas but secondary involvement with seeding in context of bacteremia is possible    MRI of chest without evidence of osteomyelitis or septic arthritis of the sternoclavicular joint but with question of cellulitis and pectoral myositis    Brain MRI with subacute lacunar infarct.  Neurology without high suspicion that this is contributing to mental status changes.  At this point as patient does not improving as anticipated I would recommend pursuing lumbar puncture.I would recommend sending studies as per fellow note above    I would add IV vancomycin to patient's regimen given this finding of cellulitis/myositis    I would switch Zosyn to meropenem to maintain CNS penetration for the preceding Pseudomonas isolate.    If there is delay in obtaining lumbar puncture today I would add IV ganciclovir to patient's regimen for empiric coverage of herpes simplex and CMV    Prognosis guarded    Dr. Joselyn Stroud will be covering the patient starting from tomorrow. Please reach out to her for further questions and follow up.     Gianluca Loving M.D.  [facility] Division of Infectious Disease  8AM-5PM Monday - Friday: Available on [employer] Teams  After Hours and Holidays (or if no response on [employer] Teams): Please contact the Infectious Diseases Office at [phone]     The above assessment and plan were discussed with CICU Team

## 2025-02-28 NOTE — PROGRESS NOTE ADULT - ASSESSMENT
74yo M pmhx HTN, HLD, nonischemic cardiomyopathy, chronic systolic heart failure s/p HM2 LVAD (6/2017), s/p heart transplant from Hep. 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 Pt here with change in MS, hyperkalemia and norman.   Patient was given Calcium Gluconate 2g, D50/Insulin 5 unit, 40 mg IV lasix, Lokelma 10mg.   Vancomycin and Zosyn       Hyperkalemia:  Pt with K level of 6.7 that improved to 6.1 after medical management with additional dose of insulin given.   K 5.6 initially   On CRRT now. Serum K is better now.   Monitor labs.     Stage 3 chronic kidney disease: with superimposed NORMAN:  Patient with known history of CKD. Upon HIE review, Scr ranged from 1.3-2.1 in 2023. Most recent Scr was 1.75 on 1/30/24.   Scr is worsening now.  2.49 --->2.75--> 4.34--> 4.65---> 6.64 -->6.68 ---> 6.89.  pt was started on CRRT 2/23. Consent was obtained from pt's care taker - Tahira and it was placed in charts.   CPK is down trending. - Pt denies any seizure before coming to hospital. (Though not reliable history) - EEG ruled out seizures.   NORMAN likely in setting of ? underlying infection related and hemodynamic mediated ATN. Phos level was high. uric acid low, less likely TLS but uric acid was checked post CRRT.   Transplant ID reccs appreciated.   Pt has respiratory acidosis as well along with metabolic acidosis.   neurology is on board.     PLAN:  C/w CRRT. bags adjusted as per latest labs. Pt is on vasopressin gtt.   Phos binders work with PO diet only. No need for now.    Resp acidosis management as per primary team.   Monitor labs, daily wts and I/Os.   Check tacro trough daily. - 7.4---> 2.4 - Immuno suppression as per primary team.   on empirical Abx. Infectious work up as per primary team and ID. Pseudomonas bacteremia. On Zosyn.   Dose medications as per CrCl ~25ml/min for now.     CRRT was initiated on 2/23.   Cont for now, no major changes, not clotting thus far  Uremia is not the cause of his Change in mental status as his crt and BUN were baseline when he came in with this change in MS and worsened in the hospital.    wait for the final reccs from the attending.        72yo M pmhx HTN, HLD, nonischemic cardiomyopathy, chronic systolic heart failure s/p HM2 LVAD (6/2017), s/p heart transplant from Hep. 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 Pt here with change in MS, hyperkalemia and norman.   Patient was given Calcium Gluconate 2g, D50/Insulin 5 unit, 40 mg IV lasix, Lokelma 10mg.   Vancomycin and Zosyn       Hyperkalemia:  Pt with K level of 6.7 that improved to 6.1 after medical management with additional dose of insulin given.   K 5.6 initially   On CRRT now. Serum K is better now.   Monitor labs.     Stage 3 chronic kidney disease: with superimposed NORMAN:  Patient with known history of CKD. Upon HIE review, Scr ranged from 1.3-2.1 in 2023. Most recent Scr was 1.75 on 1/30/24.   Scr is worsening now.  2.49 --->2.75--> 4.34--> 4.65---> 6.64 -->6.68 ---> 6.89.  pt was started on CRRT 2/23. Consent was obtained from pt's care taker - Tahira and it was placed in charts.   CPK is down trending. - Pt denies any seizure before coming to hospital. (Though not reliable history) - EEG ruled out seizures.   NORMAN likely in setting of ? underlying infection related and hemodynamic mediated ATN. Phos level was high. uric acid low, less likely TLS but uric acid was checked post CRRT.   Transplant ID reccs appreciated.   Pt has respiratory acidosis as well along with metabolic acidosis.   neurology is on board.     PLAN:  C/w CRRT. bags adjusted as per latest labs. Pt is on vasopressin gtt.   Phos binders work with PO diet only. No need for now.    Resp acidosis management as per primary team.   Monitor labs, daily wts and I/Os.   Check tacro trough daily. - 7.4---> 2.4 - Immuno suppression as per primary team.   on empirical Abx. Infectious work up as per primary team and ID. Pseudomonas bacteremia. On Zosyn.   Dose medications as per CrCl ~25ml/min for now.     CRRT was initiated on 2/23.   Cont for now, no major changes, not clotting thus far  Uremia is not the cause of his Change in mental status as his crt and BUN were baseline when he came in with this change in MS and worsened in the hospital.      For weekend coverage, please call Dr. Cherie Alvarez( fellow) or Dr Lara Tucker( Attending)

## 2025-03-01 NOTE — PROGRESS NOTE ADULT - PROBLEM SELECTOR PLAN 4
- noted to have NORMAN on admission  - Nephrology following, appreciate recommendations  - Remains anuric, on CRRT (I=O)  - trend daily.

## 2025-03-01 NOTE — PROGRESS NOTE ADULT - PROBLEM SELECTOR PLAN 8
- hx of hemolytic anemia, follows with Dr. Rosario as outpatient  - switched pred to now solucortef 75 q8h

## 2025-03-01 NOTE — PROGRESS NOTE ADULT - SUBJECTIVE AND OBJECTIVE BOX
____________________________________________________  ROS  GENERAL: denies chills, , night sweats, weight loss.   PSYCH: denies depression, anxiety, suicidal ideation, hallucination, and delusions  SKIN: no rash or lesions; no color changes, no abnormal nevi,no  dryness, and nojaundice    EYES: denies visual changes, floaters, pain, inflammation, blurred vision, and discharge  ENT: denies tinnitus, vertigo, epistaxis, oral lesion, and decreased acuity  PULM: denies, hemoptysis, pleurisy  CVS: denies angina, palpitations,+ orthopnea, no syncope, or heart murmur  GI: denies constipation, diarrhea, melena, abdominal pain, nausea.   : denies dysuria, frequency, discharge, incontinence, stones or macroscopic hematuria  MS: no arthralgias, no erythema or swelling, no myalgias, noedema, or lower back pain.   CNS: denies numbness, dizziness, seizure, or tremor  ENDO: denies heat/cold intolerance, polyuria, polydipsia, malaise.    HEME: denies bruising, bleeding, lymphadenopathy, anemia, and calf pain    Allergies  No Known Allergies    __________________________________________________  MEDS:  MEDICATIONS  (STANDING):  aspirin  chewable 81 daily  atorvastatin 10 at bedtime  dexMEDEtomidine Infusion 0.5 <Continuous>  heparin  Infusion 1000 <Continuous>  hydrocortisone sodium succinate Injectable 75 every 8 hours  insulin lispro (ADMELOG) corrective regimen sliding scale  every 6 hours  metoprolol tartrate 25 two times a day  pantoprazole  Injectable 40 two times a day  polyethylene glycol 3350 17 daily  senna 2 daily  tacrolimus    0.5 mG/mL Suspension 2.5 <User Schedule>    _________________________________________________  ANTIMICOBIALS  atovaquone  Suspension 1500 daily  meropenem  IVPB 1000 every 8 hours  nystatin    Suspension 672982 four times a day  tacrolimus    0.5 mG/mL Suspension 2.5 <User Schedule>  valGANciclovir 50 mG/mL Oral Solution 450 <User Schedule>  vancomycin  IVPB 750 every 12 hours      GENERAL LABS              7.4                  134  | 23   | 19           18.43 >-----------< 206     ------------------------< 142                   24.1                 3.9  | 100  | 1.68                                         Ca 7.5   Mg 2.5   Ph 2.6        Urinalysis Basic - ( 01 Mar 2025 08:53 )    Color: x / Appearance: x / SG: x / pH: x  Gluc: 142 mg/dL / Ketone: x  / Bili: x / Urobili: x   Blood: x / Protein: x / Nitrite: x   Leuk Esterase: x / RBC: x / WBC x   Sq Epi: x / Non Sq Epi: x / Bacteria: x        _________________________________________________  MICROBIOLOGY  -----------    Culture - Blood (collected 27 Feb 2025 12:15)  Source: .Blood Blood  Preliminary Report (28 Feb 2025 17:02):    No growth at 24 hours    Culture - Blood (collected 25 Feb 2025 11:40)  Source: .Blood Blood-Peripheral  Preliminary Report (28 Feb 2025 16:01):    No growth at 72 Hours    Culture - Blood (collected 25 Feb 2025 11:35)  Source: .Blood Blood-Peripheral  Preliminary Report (28 Feb 2025 16:01):    No growth at 72 Hours                    Fungitell:   _______________________________________________  PERTINENT IMAGING    Follow Up:  Bacteremia    Interval History/ROS: afebrile, gradually worsening leucocytosis, remains on Precedex and CRRT.      Vital Signs Last 24 Hrs  T(C): 37.3 (01 Mar 2025 11:00), Max: 37.7 (01 Mar 2025 03:00)  T(F): 99.1 (01 Mar 2025 11:00), Max: 99.9 (01 Mar 2025 03:00)  HR: 98 (01 Mar 2025 13:00) (86 - 126)  BP: 126/57 (01 Mar 2025 13:00) (102/52 - 144/66)  BP(mean): 82 (01 Mar 2025 13:00) (75 - 99)  RR: 20 (01 Mar 2025 13:00) (11 - 25)  SpO2: 99% (01 Mar 2025 13:00) (96% - 100%)    Parameters below as of 01 Mar 2025 13:00  Patient On (Oxygen Delivery Method): room air          PHYSICAL EXAM:  General: Patient in NAD  HEENT: NCAT, NGT in place  CV: S1+S2, no m/r/g appreciated   Lungs: No respiratory distress, CTA anteriorly  Abd: Soft, nontender, no guarding, no rebound tenderness, + bowel sounds   : No suprapubic tenderness  Neuro: Somnolent, Moves all extremities against gravity.  Ext: No cyanosis, no edema, LUE arm blistering dressed , left groin HD cath+  Skin: erythematous papule at the right sternoclavicular border - no drainage or tenderness

## 2025-03-01 NOTE — PROGRESS NOTE ADULT - SUBJECTIVE AND OBJECTIVE BOX
Anthony Fang MD  Cardiology Fellow  [phone]  All Cardiology service information can be found 24/7 on [email or web address], password: [email or web address]    Patient seen and examined at bedside.  Tm 99.9 HR 90s-110s BP 110s-130s/50s-60s satting % Ra  Bedweight 154.9lbs; 1760/1127 (+633)  On prededex, hep gtt  17.23 < 7.4 > 192   138/4.4 | 103/22 | 21/1.76 < 109 Ca 7.5 protein 5.2   138/4.4 | 103/22 | 27/1.76 < 109  28/13 | 48 | 0.9 | 5.2/2.5 Mg 2.5 Phos 2.7   PT 11.3 apTT 70.9 INR 0.99     Review Of Systems: No chest pain, shortness of breath, or palpitations            Current Meds:  aspirin  chewable 81 milliGRAM(s) Oral daily  atorvastatin 10 milliGRAM(s) Oral at bedtime  atovaquone  Suspension 1500 milliGRAM(s) Oral daily  chlorhexidine 2% Cloths 1 Application(s) Topical daily  cholecalciferol 1000 Unit(s) Oral daily  CRRT Treatment    <Continuous>  dexMEDEtomidine Infusion 0.5 MICROgram(s)/kG/Hr IV Continuous <Continuous>  heparin  Infusion 1000 Unit(s)/Hr IV Continuous <Continuous>  hydrocortisone sodium succinate Injectable 75 milliGRAM(s) IV Push every 8 hours  insulin lispro (ADMELOG) corrective regimen sliding scale   SubCutaneous every 6 hours  meropenem  IVPB 1000 milliGRAM(s) IV Intermittent every 8 hours  nystatin    Suspension 739461 Unit(s) Oral four times a day  pantoprazole  Injectable 40 milliGRAM(s) IV Push two times a day  Phoxillum Filtration BK 4 / 2.5 5000 milliLiter(s) CRRT <Continuous>  polyethylene glycol 3350 17 Gram(s) Oral daily  PrismaSATE Dialysate BGK 4 / 2.5 5000 milliLiter(s) CRRT <Continuous>  PrismaSOL Filtration BGK 0 / 2.5 5000 milliLiter(s) CRRT <Continuous>  senna 2 Tablet(s) Oral daily  tacrolimus    0.5 mG/mL Suspension 2.5 milliGRAM(s) Oral <User Schedule>  valGANciclovir 50 mG/mL Oral Solution 450 milliGRAM(s) Oral <User Schedule>  vancomycin  IVPB 750 milliGRAM(s) IV Intermittent every 12 hours    Vitals:  T(F): 99 (03-01), Max: 99.9 (03-01)  HR: 107 (03-01) (86 - 126)  BP: 129/60 (03-01) (113/54 - 163/68)  RR: 23 (03-01)  SpO2: 100% (03-01)  I&O's Summary    28 Feb 2025 07:01  -  01 Mar 2025 07:00  --------------------------------------------------------  IN: 1760.8 mL / OUT: 1127 mL / NET: 633.8 mL        Physical Exam:  Appearance: No acute distress; well appearing  Eyes: PERRL, EOMI, pink conjunctiva  HEENT: Normal oral mucosa  Cardiovascular: RRR, S1, S2, no murmurs, rubs, or gallops; no edema; no JVD  Respiratory: Clear to auscultation bilaterally  Gastrointestinal: soft, non-tender, non-distended with normal bowel sounds  Musculoskeletal: No clubbing; no joint deformity   Neurologic: Non-focal  Lymphatic: No lymphadenopathy  Psychiatry: AAOx3, mood & affect appropriate  Skin: No rashes, ecchymoses, or cyanosis                          7.4    17.23 )-----------( 192      ( 01 Mar 2025 02:15 )             23.4     03-01    138  |  103  |  21  ----------------------------<  109[H]  4.4   |  22  |  1.76[H]    Ca    7.5[L]      01 Mar 2025 02:17  Phos  2.7     03-01  Mg     2.5     03-01    TPro  5.2[L]  /  Alb  2.5[L]  /  TBili  0.9  /  DBili  x   /  AST  28  /  ALT  13  /  AlkPhos  48  03-01    PT/INR - ( 01 Mar 2025 02:18 )   PT: 11.3 sec;   INR: 0.99 ratio         PTT - ( 01 Mar 2025 02:18 )  PTT:70.9 sec    2/28 MRI: IMPRESSION:  1.  Sternal wires are present without osseous fusion.  2.  Marrow appears preserved given the metallic artifact without evidence   for osteomyelitis.  3.  Sternoclavicular joints appear preserved.  4.  Edema of the right pectoralis along the manubrium and clavicle with   surrounding soft tissue edema. Changes may reflect cellulitis and   myositis with the given history versus injury.  5.  No abscess is seen.   Anthony Fang MD  Cardiology Fellow  [phone]  All Cardiology service information can be found 24/7 on [email or web address], password: [email or web address]    Patient seen and examined at bedside.  Tm 99.9 HR 90s-110s BP 110s-130s/50s-60s satting % Ra  Bedweight 154.9lbs; 1760/1127 (+633)  On prededex, hep gtt  tele with svt vs. aflutter  17.23 < 7.4 > 192   138/4.4 | 103/22 | 21/1.76 < 109 Ca 7.5 protein 5.2   28/13 | 48 | 0.9 | 5.2/2.5 Mg 2.5 Phos 2.7   PT 11.3 apTT 70.9 INR 0.99     Review Of Systems: No chest pain, shortness of breath, or palpitations            Current Meds:  aspirin  chewable 81 milliGRAM(s) Oral daily  atorvastatin 10 milliGRAM(s) Oral at bedtime  atovaquone  Suspension 1500 milliGRAM(s) Oral daily  chlorhexidine 2% Cloths 1 Application(s) Topical daily  cholecalciferol 1000 Unit(s) Oral daily  CRRT Treatment    <Continuous>  dexMEDEtomidine Infusion 0.5 MICROgram(s)/kG/Hr IV Continuous <Continuous>  heparin  Infusion 1000 Unit(s)/Hr IV Continuous <Continuous>  hydrocortisone sodium succinate Injectable 75 milliGRAM(s) IV Push every 8 hours  insulin lispro (ADMELOG) corrective regimen sliding scale   SubCutaneous every 6 hours  meropenem  IVPB 1000 milliGRAM(s) IV Intermittent every 8 hours  nystatin    Suspension 198743 Unit(s) Oral four times a day  pantoprazole  Injectable 40 milliGRAM(s) IV Push two times a day  Phoxillum Filtration BK 4 / 2.5 5000 milliLiter(s) CRRT <Continuous>  polyethylene glycol 3350 17 Gram(s) Oral daily  PrismaSATE Dialysate BGK 4 / 2.5 5000 milliLiter(s) CRRT <Continuous>  PrismaSOL Filtration BGK 0 / 2.5 5000 milliLiter(s) CRRT <Continuous>  senna 2 Tablet(s) Oral daily  tacrolimus    0.5 mG/mL Suspension 2.5 milliGRAM(s) Oral <User Schedule>  valGANciclovir 50 mG/mL Oral Solution 450 milliGRAM(s) Oral <User Schedule>  vancomycin  IVPB 750 milliGRAM(s) IV Intermittent every 12 hours    Vitals:  T(F): 99 (03-01), Max: 99.9 (03-01)  HR: 107 (03-01) (86 - 126)  BP: 129/60 (03-01) (113/54 - 163/68)  RR: 23 (03-01)  SpO2: 100% (03-01)  I&O's Summary    28 Feb 2025 07:01  -  01 Mar 2025 07:00  --------------------------------------------------------  IN: 1760.8 mL / OUT: 1127 mL / NET: 633.8 mL        Physical Exam:  Appearance: No acute distress  Eyes: PERRL, EOMI, pink conjunctiva  HEENT: Normal oral mucosa  Cardiovascular: tachycardic   Respiratory: Clear to auscultation bilaterally  Gastrointestinal: soft, non-tender, non-distended with normal bowel sounds  Musculoskeletal: No clubbing; no joint deformity   Neurologic: Non-focal  Lymphatic: No lymphadenopathy  Psychiatry: AAOx3, mood & affect appropriate  Skin: No rashes, ecchymoses, or cyanosis                          7.4    17.23 )-----------( 192      ( 01 Mar 2025 02:15 )             23.4     03-01    138  |  103  |  21  ----------------------------<  109[H]  4.4   |  22  |  1.76[H]    Ca    7.5[L]      01 Mar 2025 02:17  Phos  2.7     03-01  Mg     2.5     03-01    TPro  5.2[L]  /  Alb  2.5[L]  /  TBili  0.9  /  DBili  x   /  AST  28  /  ALT  13  /  AlkPhos  48  03-01    PT/INR - ( 01 Mar 2025 02:18 )   PT: 11.3 sec;   INR: 0.99 ratio         PTT - ( 01 Mar 2025 02:18 )  PTT:70.9 sec    2/28 MRI: IMPRESSION:  1.  Sternal wires are present without osseous fusion.  2.  Marrow appears preserved given the metallic artifact without evidence   for osteomyelitis.  3.  Sternoclavicular joints appear preserved.  4.  Edema of the right pectoralis along the manubrium and clavicle with   surrounding soft tissue edema. Changes may reflect cellulitis and   myositis with the given history versus injury.  5.  No abscess is seen.

## 2025-03-01 NOTE — PHYSICAL EXAM
Patient admitted after midnight  She is a 36-year-old female who came in due to severe alcohol withdrawals with Dts    Severe alcohol withdrawal with DTs.  Continue CIWA protocol.  Phenobarb taper.  Precedex.  Wean as tolerated.  Peer recovery when mentation improves  Hypertension.  Continue home meds  Elevated LFTs.  Likely due to alcohol use.  Monitor  Generalized anxiety disorder.  Continue home meds     [Fully active, able to carry on all pre-disease performance without restriction] : Status 0 - Fully active, able to carry on all pre-disease performance without restriction [Normal] : affect appropriate [de-identified] : RRR. S1S2 normal. Gr 2/6 holosystolic murmur LLSB. No gallops.

## 2025-03-01 NOTE — PROGRESS NOTE ADULT - ASSESSMENT
====================ASSESSMENT ==============  73yo M w/ pmhx HTN, HLD, nonischemic cardiomyopathy, chronic systolic heart failure s/p HM2 LVAD (6/2017), s/p heart transplant from Hep. 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 Found to have septic shock, (2/19 BCx pseudo, 2/22 BCx NGTD). Still episodes of hypotension. Worsening NORMAN. Accepted to MICU for CRRT.    Plan:  ====================== NEUROLOGY=====================  # Metabolic encephalopathy.  - Likely multi-factorial etiologies: septic vs uremic vs hypercapnic, less likely meningitis  - Patient baseline AOx3. AOx1 at ED arrival, iso of infection vs metabolic derangements (uremic encephelopathy)  - CTH and angio w/o hemorrhage or stenosis   - TSH wnl / B12, CK 2200  - Fall precautions  - spot EEG 2/21: negative for seizures   - Currently A&O xO  - s/p seroquel 25mg x1 (2/27 overnight)    Plan  - Monitor MS w/ abx and CRRT  - Neurology consulted  - c/w precedex gtt 1.0    dexMEDEtomidine Infusion 0.5 MICROgram(s)/kG/Hr IV Continuous <Continuous>  fentaNYL    Injectable 25 MICROGram(s) IV Push once    ==================== RESPIRATORY======================  #Metabolic acidosis  - respiratory and metabolic acidosis VpH 7.17, PCO2 57 on admission  - pH improving, likely as respiratory component has improved  - Remains on room air    ====================CARDIOVASCULAR==================  #Septic shock from pseudomonas bacteremia  - POCUS reveals no evidence of  fluid overload  - Pt is also likely intravascular depleted with ileus and poor po intake.   - S/p IVF  - Patient volume up hold off IVF, now running net even on CRRT  - s/p levo, off 2/24    Plan  - F/u transplant ID recs - CMV ordered    # H/O heart transplant.   - Nonischemic cardiomyopathy, chronic systolic heart failure s/p HM2 LVAD (6/2017), s/p heart transplant from Hep. C donor (treated) 2/23/18   - Home Tacrolimus dose 2mg BID, Pred 15mg qd  - Intolerant of Cellcept due to leukopenia.  - Continue with home Pred 15mg qd  - Hold home Bactrim given hyperkalemia, now on Atovaquone for ppx  - ASA  - c/w home Atorvastatin (therapeutic interchange)   - HOLD home Metoprolol from 200mg with hold parameters due to hypotension (02/23)  - Tacro level daily : home regimen 2mg BID, c/w 0.8mg BID-> will adjust by level today     # Paroxysmal atrial fibrillation.   - Home regimen : Eliquis 5 mg PO BID at home for hx of of afib and IJ thrombi  - EKG on admission - nsr   - [id number]  = 3  - Last Dose : 2/20 AM   - Eliquis held early in week for possible LP and episode coffee ground emesis -> no current plan for LP, no recurrent emesis or significant NG tube output x several days  - Will starttherapeutic heparin gtt for AC    # Hypertension (chronic)  - Hold home Toprol  mg PO QD ISO hypotension  - Hold home Losartan 75 mg PO QD due to NORMAN & hypotension.    # Vascular disorder of lower extremity.   - Extremities appear cool and dry   - c/w Local wound care by podiatry   - Podiatry recommendations - pending SHANEKA studies, consider vascular evaluation if abnormal.    ===================HEMATOLOGIC/ONC ===================  # Hx of hemolytic anemia, Sanchez Syndrome  - hx of hemolytic anemia, follows with Dr. Rosairo as outpatient  - hold Prednisone 15 mg PO QD --> switched to Solu-cortef 75mg IV q8hr   - f/u hematology/oncology recs  - Monitor H&H daily.  - f/u hemolysis labs and peripheral smear    # DVT ppx  - therapeutic heparin gtt for Afib    aspirin  chewable 81 milliGRAM(s) Oral daily    ===================== RENAL =========================  NORMAN on CKD  ATN 2/2 septic shock  - Known hx of CKD, Cr now uptrending  - Scr ranged from 1.3-2.1 in 2023. Most recent Scr was 1.75 on 1/30/24. On admission, Scr was 1.9 and is worsening now. UA showed trace ketones.   - No improvement with IVF, likely ATN i/s/o contrast/medications.   - S/p double lumen L fem catheter  - CRRT net even  - c/w renvela for hyperphosphatemia  - CRRT as per nephro    #Hyperkalemia (resolved)   - K 6.2 at ED arrival  - shifted in the ED, no EKG changes consistent with severe hyper K. Cr not significantly elevated form baseline, c/f RTA from bactrim and Tacro. Stable   - s/p Lokelma 10mg BID for 3 days  - f/u Tacro level daily   - Hold home bactrim - ID consult regarding Atovaquone   - c/w monitoring patient on Tele.  - CRRT as above    ==================== GASTROINTESTINAL===================  #Ileus  #+/- coffee ground emesis  - Ileus could potentially be source of infx  - 1 episode of coffee ground emesis, unclear if true GIB  - Hb stable   - CTAP (2/22) ileus vs partial SBO - cannot r/o GI bleed  - NGT placed set to LIS -> minimal output x several days  - monitor for further episodes  - c/w PPI IV 40 BID   - c/w trending CBC Q12H  - bowel regimen: senna, miralax  - Restart DVT Ppx/ASA and heparin gtt for full AC for now; if no more GIB and HgB stable  - if repeat episode, GI consult   - If repeat episode or worsening lactate, CT angiogram with venous phase.  - 2/28 - trial trickle tube feeds    pantoprazole  Injectable 40 milliGRAM(s) IV Push two times a day  polyethylene glycol 3350 17 Gram(s) Oral daily  senna 2 Tablet(s) Oral daily    =======================    ENDOCRINE  =====================  # Diabetes type 2.   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 No manifest signs of bowel ischemia. Interval worsening of right lower lobe and right middle lobe atelectasis secondary to elevated right hemidiaphragm when compared to prior CT dated 7/11/2023. Superimposed infection in the collapsed lungs is not excluded  - s/p meropenem (2/21-2/24)  - Hold off additional doses of Vancomycin (MRSA swab negative)  - c/w home Valganciclovir for ppx renally dosed   - f/u infectious labs - Toxoplasma, EBV, Fungitell, Galactomannan, CMV, Cryptococcus, MRSA, ASCENCION virus, CMV, Crypto, ASCENCION virus  - f/u GI PCR, consider C. Diff testing   - Transplant ID recommendations   - Consider LP if MS not improving  - Warm blanket   ====================ASSESSMENT ==============  73yo M w/ pmhx HTN, HLD, nonischemic cardiomyopathy, chronic systolic heart failure s/p HM2 LVAD (6/2017), s/p heart transplant from Hep. 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 Found to have septic shock, (2/19 BCx pseudo, 2/22 BCx NGTD). Still episodes of hypotension. Worsening NORMAN. Accepted to MICU for CRRT.    Plan:  ====================== NEUROLOGY=====================  # Metabolic encephalopathy.  - Likely multi-factorial etiologies: septic vs uremic vs hypercapnic, less likely meningitis  - Patient baseline AOx3. AOx1 at ED arrival, iso of infection vs metabolic derangements (uremic encephelopathy)  - CTH and angio w/o hemorrhage or stenosis   - TSH wnl / B12, CK 2200  - Fall precautions  - spot EEG 2/21: negative for seizures   - Currently A&O xO  - Cont. Low dose precedex gtt     Plan  - Monitor MS w/ abx and CRRT  - Neurology consulted  - c/w precedex gtt 1.0    dexMEDEtomidine Infusion 0.5 MICROgram(s)/kG/Hr IV Continuous <Continuous>  fentaNYL    Injectable 25 MICROGram(s) IV Push once    ==================== RESPIRATORY======================  #Metabolic acidosis  - respiratory and metabolic acidosis VpH 7.17, PCO2 57 on admission  - pH improving, likely as respiratory component has improved  - Remains on room air    ====================CARDIOVASCULAR==================  #Septic shock from pseudomonas bacteremia  - POCUS reveals no evidence of  fluid overload  - Pt is also likely intravascular depleted with ileus and poor po intake.   - S/p IVF  - Patient volume up hold off IVF, now running net even on CRRT  - s/p levo, off 2/24    Plan  - F/u transplant ID recs - CMV ordered    # H/O heart transplant.   - Nonischemic cardiomyopathy, chronic systolic heart failure s/p HM2 LVAD (6/2017), s/p heart transplant from Hep. C donor (treated) 2/23/18   - Home Tacrolimus dose 2mg BID, Pred 15mg qd  - Intolerant of Cellcept due to leukopenia.  - Continue with home Pred 15mg qd  - Hold home Bactrim given hyperkalemia, now on Atovaquone for ppx  - ASA  - c/w home Atorvastatin (therapeutic interchange)   - HOLD home Metoprolol from 200mg with hold parameters due to hypotension (02/23)  - Tacro level daily : home regimen 2mg BID, c/w 0.8mg BID-> will adjust by level today     # Paroxysmal atrial fibrillation.   - Home regimen : Eliquis 5 mg PO BID at home for hx of of afib and IJ thrombi  - EKG on admission - nsr   - [id number]  = 3  - Last Dose : 2/20 AM   - Eliquis held early in week for possible LP and episode coffee ground emesis -> no current plan for LP, no recurrent emesis or significant NG tube output x several days  -  heparin gtt for AC    # Hypertension (chronic)  - Hold home Toprol  mg PO QD ISO hypotension  - Hold home Losartan 75 mg PO QD due to NORMAN & hypotension.    # Vascular disorder of lower extremity.   - Extremities appear cool and dry   - c/w Local wound care by podiatry   - Podiatry recommendations - pending SHANEKA studies, consider vascular evaluation if abnormal.    ===================HEMATOLOGIC/ONC ===================  # Hx of hemolytic anemia, Sanchez Syndrome  - hx of hemolytic anemia, follows with Dr. Rosario as outpatient  - hold Prednisone 15 mg PO QD --> switched to Solu-cortef 75mg IV q8hr   - f/u hematology/oncology recs  - Monitor H&H daily.  - f/u hemolysis labs and peripheral smear    # DVT ppx  - therapeutic heparin gtt for Afib    ===================== RENAL =========================  NORMAN on CKD  ATN 2/2 septic shock  - Known hx of CKD, Cr now uptrending  - Scr ranged from 1.3-2.1 in 2023. Most recent Scr was 1.75 on 1/30/24. On admission, Scr was 1.9 and is worsening now. UA showed trace ketones.   - No improvement with IVF, likely ATN i/s/o contrast/medications.   - S/p double lumen L fem catheter  - CRRT net even  - c/w renvela for hyperphosphatemia  - CRRT as per nephro    #Hyperkalemia (resolved)   - K 6.2 at ED arrival  - shifted in the ED, no EKG changes consistent with severe hyper K. Cr not significantly elevated form baseline, c/f RTA from bactrim and Tacro. Stable   - s/p Lokelma 10mg BID for 3 days  - f/u Tacro level daily   - Hold home bactrim - ID consult regarding Atovaquone   - c/w monitoring patient on Tele.  - CRRT as above    ==================== GASTROINTESTINAL===================  #Ileus  #+/- coffee ground emesis  - Ileus could potentially be source of infx  - 1 episode of coffee ground emesis, unclear if true GIB  - Hb stable   - CTAP (2/22) ileus vs partial SBO - cannot r/o GI bleed  - NGT placed set to LIS -> minimal output x several days  - monitor for further episodes  - c/w PPI IV 40 BID   - c/w trending CBC Q12H  - bowel regimen: senna, miralax  - Restart DVT Ppx/ASA and heparin gtt for full AC for now; if no more GIB and HgB stable  - if repeat episode, GI consult   - If repeat episode or worsening lactate, CT angiogram with venous phase.  - 2/28 - trial trickle tube feeds    pantoprazole  Injectable 40 milliGRAM(s) IV Push two times a day  polyethylene glycol 3350 17 Gram(s) Oral daily  senna 2 Tablet(s) Oral daily    =======================    ENDOCRINE  =====================  # Diabetes type 2.   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 No manifest signs of bowel ischemia. Interval worsening of right lower lobe and right middle lobe atelectasis secondary to elevated right hemidiaphragm when compared to prior CT dated 7/11/2023. Superimposed infection in the collapsed lungs is not excluded  - s/p meropenem (2/21-2/24)  - Hold off additional doses of Vancomycin (MRSA swab negative)  - c/w home Valganciclovir for ppx renally dosed   - f/u infectious labs - Toxoplasma, EBV, Fungitell, Galactomannan, CMV, Cryptococcus, MRSA, ASCENCION virus, CMV, Crypto, ASCENCION virus  - f/u GI PCR, consider C. Diff testing   - Transplant ID recommendations   - Consider LP if MS not improving  - Warm blanket

## 2025-03-01 NOTE — PROGRESS NOTE ADULT - PROBLEM SELECTOR PLAN 5
- s/p LVAD explant and OHT 2/23/18 with hepatitis c donor  - blood pressure meds on hold as below  - continue ASA 81 mg PO QD and atorva 10  - holding home bactrim due to hyperkalemia (was on Bactrim S/S PO M/W/F). Now atovaquone  - continue nystatin 4 times daily.

## 2025-03-01 NOTE — PROGRESS NOTE ADULT - SUBJECTIVE AND OBJECTIVE BOX
CICU DAY NOTE  Admission date: 2/20/25  Chief complaint/ Diagnosis: Shock   HPI: 75yo M pmhx HTN, HLD, nonischemic cardiomyopathy, chronic systolic heart failure s/p HM2 LVAD (6/2017), s/p heart transplant from Hep. 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 On the phone, the patient's godbrother mentioned that on 2/18, the patient was in the car with his godbrother and was disoriented asking to get off on the road 4 blocks before his house. In addition, a caretaker stated that when she spoke to Mr. Hameed on the phone at 11am on 2/18, he was doing well. However, when the caretaker visited 2 hours later at 1pm, the patient was disoriented and felt his legs were heavy. This prompted the patient to come to the hospital.   In the ED: Hypothermic 91.4, HR 80, /60, RA   Patient noted to be Hyperkalemic (6.2) with Mixed respiratory and metabolic acidosis pH 7.2. - Patient was given Calcium Gluconate 2g, D50/Insulin 5 unit, 40 mg IV lasix, Lokelma 10mg.   Vancomycin and Zosyn  (20 Feb 2025 07:21)    Interval history: persistent AMS issue      MEDICATIONS  (STANDING)  aspirin  chewable 81 milliGRAM(s) Oral daily  atorvastatin 10 milliGRAM(s) Oral at bedtime  atovaquone  Suspension 1500 milliGRAM(s) Oral daily  chlorhexidine 2% Cloths 1 Application(s) Topical daily  cholecalciferol 1000 Unit(s) Oral daily  CRRT Treatment    <Continuous>  dexMEDEtomidine Infusion 0.5 MICROgram(s)/kG/Hr (11.3 mL/Hr) IV Continuous <Continuous>  heparin  Infusion 1000 Unit(s)/Hr (10 mL/Hr) IV Continuous <Continuous>  hydrocortisone sodium succinate Injectable 75 milliGRAM(s) IV Push every 8 hours  insulin lispro (ADMELOG) corrective regimen sliding scale   SubCutaneous every 6 hours  meropenem  IVPB 1000 milliGRAM(s) IV Intermittent every 8 hours  nystatin    Suspension 058965 Unit(s) Oral four times a day  pantoprazole  Injectable 40 milliGRAM(s) IV Push two times a day  Phoxillum Filtration BK 4 / 2.5 5000 milliLiter(s) (1000 mL/Hr) CRRT <Continuous>  polyethylene glycol 3350 17 Gram(s) Oral daily  PrismaSATE Dialysate BGK 4 / 2.5 5000 milliLiter(s) (1200 mL/Hr) CRRT <Continuous>  PrismaSOL Filtration BGK 0 / 2.5 5000 milliLiter(s) (200 mL/Hr) CRRT <Continuous>  senna 2 Tablet(s) Oral daily  tacrolimus    0.5 mG/mL Suspension 2.5 milliGRAM(s) Oral <User Schedule>  valGANciclovir 50 mG/mL Oral Solution 450 milliGRAM(s) Oral <User Schedule>  vancomycin  IVPB 750 milliGRAM(s) IV Intermittent every 12 hours    Allergy  No Known Allergies    ICU Vital Signs Last 24 Hrs  T(C): 37.2 (01 Mar 2025 07:00), Max: 37.7 (01 Mar 2025 03:00)  T(F): 99 (01 Mar 2025 07:00), Max: 99.9 (01 Mar 2025 03:00)  HR: 107 (01 Mar 2025 07:00) (86 - 126)  BP: 129/60 (01 Mar 2025 07:00) (113/54 - 163/68)  BP(mean): 86 (01 Mar 2025 07:00) (78 - 115)  RR: 23 (01 Mar 2025 07:00) (11 - 32)  SpO2: 100% (01 Mar 2025 07:00) (94% - 100%)on room air     I&O's Summary  IN: 1792.1 mL / OUT: 1392 mL (on CRRT) / NET: 400.1 mL    PHYSICAL EXAM  Appearance: Normal, NAD  HEAD:   Normocephalic  EYES: PERRLA, conjunctiva and sclera clear  NECK: Supple, No JVD  CHEST/LUNG: Clear to auscultation bilaterally; No wheezing  HEART: Normal S1, S2. No murmurs, rubs, or gallops  ABDOMEN: + Bowel sounds, Soft, NT, ND   EXTREMITIES:  2+ Peripheral Pulses, No clubbing, cyanosis, or edema  NEUROLOGY: non-focal, aaox3  SKIN: No rashes or lesions    Interpretation of Telemetry:                      7.4  <8.0  17.23 )-----------( 192                 23.4     138  |  103  |  21  ------------------------<  109[H]  4.4   |  22  |  1.76    Ca    7.5[L]    Phos  2.7     Mg     2.5      TPro  5.2[L]  /  Alb  2.5[L]  /  TBili  0.9  /  DBili  x   /  AST  28  /  ALT  13  /  AlkPhos  48   F/S 105-112           CICU DAY NOTE  Admission date: 2/20/25  Chief complaint/ Diagnosis: Shock   HPI: 73yo M pmhx HTN, HLD, nonischemic cardiomyopathy, chronic systolic heart failure s/p HM2 LVAD (6/2017), s/p heart transplant from Hep. 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 On the phone, the patient's godbrother mentioned that on 2/18, the patient was in the car with his godbrother and was disoriented asking to get off on the road 4 blocks before his house. In addition, a caretaker stated that when she spoke to Mr. Hameed on the phone at 11am on 2/18, he was doing well. However, when the caretaker visited 2 hours later at 1pm, the patient was disoriented and felt his legs were heavy. This prompted the patient to come to the hospital.   In the ED: Hypothermic 91.4, HR 80, /60, RA   Patient noted to be Hyperkalemic (6.2) with Mixed respiratory and metabolic acidosis pH 7.2. - Patient was given Calcium Gluconate 2g, D50/Insulin 5 unit, 40 mg IV lasix, Lokelma 10mg.   Vancomycin and Zosyn  (20 Feb 2025 07:21)    Interval history: persistent AMS issue  Aggressive and unable to follow any commends       MEDICATIONS  (STANDING)  aspirin  chewable 81 milliGRAM(s) Oral daily  atorvastatin 10 milliGRAM(s) Oral at bedtime  atovaquone  Suspension 1500 milliGRAM(s) Oral daily  chlorhexidine 2% Cloths 1 Application(s) Topical daily  cholecalciferol 1000 Unit(s) Oral daily  CRRT Treatment    <Continuous>  dexMEDEtomidine Infusion 0.5 MICROgram(s)/kG/Hr (11.3 mL/Hr) IV Continuous <Continuous>  heparin  Infusion 1000 Unit(s)/Hr (10 mL/Hr) IV Continuous <Continuous>  hydrocortisone sodium succinate Injectable 75 milliGRAM(s) IV Push every 8 hours  insulin lispro (ADMELOG) corrective regimen sliding scale   SubCutaneous every 6 hours  meropenem  IVPB 1000 milliGRAM(s) IV Intermittent every 8 hours  nystatin    Suspension 657317 Unit(s) Oral four times a day  pantoprazole  Injectable 40 milliGRAM(s) IV Push two times a day  Phoxillum Filtration BK 4 / 2.5 5000 milliLiter(s) (1000 mL/Hr) CRRT <Continuous>  polyethylene glycol 3350 17 Gram(s) Oral daily  PrismaSATE Dialysate BGK 4 / 2.5 5000 milliLiter(s) (1200 mL/Hr) CRRT <Continuous>  PrismaSOL Filtration BGK 0 / 2.5 5000 milliLiter(s) (200 mL/Hr) CRRT <Continuous>  senna 2 Tablet(s) Oral daily  tacrolimus    0.5 mG/mL Suspension 2.5 milliGRAM(s) Oral <User Schedule>  valGANciclovir 50 mG/mL Oral Solution 450 milliGRAM(s) Oral <User Schedule>  vancomycin  IVPB 750 milliGRAM(s) IV Intermittent every 12 hours    Allergy  No Known Allergies    ICU Vital Signs Last 24 Hrs  T(C): 37.2 (01 Mar 2025 07:00), Max: 37.7 (01 Mar 2025 03:00)  T(F): 99 (01 Mar 2025 07:00), Max: 99.9 (01 Mar 2025 03:00)  HR: 107 (01 Mar 2025 07:00) (86 - 126)  BP: 129/60 (01 Mar 2025 07:00) (113/54 - 163/68)  BP(mean): 86 (01 Mar 2025 07:00) (78 - 115)  RR: 23 (01 Mar 2025 07:00) (11 - 32)  SpO2: 100% (01 Mar 2025 07:00) (94% - 100%)on room air     I&O's Summary  IN: 1792.1 mL / OUT: 1392 mL (on CRRT) / NET: 400.1 mL    PHYSICAL EXAM  Appearance: Normal, NAD  HEAD:   Normocephalic  EYES: PERRLA, conjunctiva and sclera clear  NECK: Supple, No JVD  CHEST/LUNG: Clear to auscultation bilaterally; No wheezing  HEART: Normal S1, S2. No murmurs, rubs, or gallops  ABDOMEN: + Bowel sounds, Soft, NT, ND   EXTREMITIES:  1+ Peripheral Pulses, No clubbing, cyanosis, or edema  NEUROLOGY: aao x0  SKIN: Multiple skin lesions upper/lower extremities     Interpretation of Telemetry:                      7.4  <8.0  17.23 )-----------( 192                 23.4     138  |  103  |  21  ------------------------<  109[H]  4.4   |  22  |  1.76    Ca    7.5[L]    Phos  2.7     Mg     2.5      TPro  5.2[L]  /  Alb  2.5[L]  /  TBili  0.9  /  DBili  x   /  AST  28  /  ALT  13  /  AlkPhos  48   F/S 105-112

## 2025-03-01 NOTE — PROGRESS NOTE ADULT - ASSESSMENT
====================ASSESSMENT ==============  73yo M w/ pmhx HTN, HLD, nonischemic cardiomyopathy, chronic systolic heart failure s/p HM2 LVAD (6/2017), s/p heart transplant from Hep. 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 Found to have septic shock, (2/19 BCx pseudo, 2/22 BCx NGTD). Still episodes of hypotension. Worsening NORMAN. Accepted to MICU for CRRT.    Plan:  ====================== NEUROLOGY=====================  # Metabolic encephalopathy.  - Likely multi-factorial etiologies: septic vs uremic vs hypercapnic, less likely meningitis  - Patient baseline AOx3. AOx1 at ED arrival, iso of infection vs metabolic derangements (uremic encephelopathy)  - CTH and angio w/o hemorrhage or stenosis   - TSH wnl / B12, CK 2200  - Fall precautions  - spot EEG 2/21: negative for seizures   - Currently A&O xO  - Cont. Low dose precedex gtt     Plan  - Monitor MS w/ abx and CRRT  - Neurology consulted  - c/w precedex gtt 1.0    ==================== RESPIRATORY======================  #Metabolic acidosis  - respiratory and metabolic acidosis VpH 7.17, PCO2 57 on admission  - pH improving, likely as respiratory component has improved  - Remains on room air    ====================CARDIOVASCULAR==================  #Septic shock from pseudomonas bacteremia  - POCUS reveals no evidence of  fluid overload  - Pt is also likely intravascular depleted with ileus and poor po intake.   - S/p IVF  - Patient volume up hold off IVF, now running net even on CRRT  - s/p levo, off 2/24    Plan  - F/u transplant ID recs - CMV ordered    # H/O heart transplant.   - Nonischemic cardiomyopathy, chronic systolic heart failure s/p HM2 LVAD (6/2017), s/p heart transplant from Hep. C donor (treated) 2/23/18   - Home Tacrolimus dose 2mg BID, Pred 15mg qd  - Intolerant of Cellcept due to leukopenia.  - Continue with home Pred 15mg qd  - Hold home Bactrim given hyperkalemia, now on Atovaquone for ppx  - ASA  - c/w home Atorvastatin (therapeutic interchange)   - HOLD home Metoprolol from 200mg with hold parameters due to hypotension (02/23)  - Tacro level daily : home regimen 2mg BID, c/w 0.8mg BID-> will adjust by level today     # Paroxysmal atrial fibrillation.   - Home regimen : Eliquis 5 mg PO BID at home for hx of of afib and IJ thrombi  - EKG on admission - nsr   - [id number]  = 3  - Last Dose : 2/20 AM   - Eliquis held early in week for possible LP and episode coffee ground emesis -> no current plan for LP, no recurrent emesis or significant NG tube output x several days  -  heparin gtt for AC    # Hypertension (chronic)  - Hold home Toprol  mg PO QD ISO hypotension  - Hold home Losartan 75 mg PO QD due to NORMAN & hypotension.    # Vascular disorder of lower extremity.   - Extremities appear cool and dry   - c/w Local wound care by podiatry   - Podiatry recommendations - pending SHANEKA studies, consider vascular evaluation if abnormal.    ===================HEMATOLOGIC/ONC ===================  # Hx of hemolytic anemia, Nicole Syndrome  - hx of hemolytic anemia, follows with Dr. Rosario as outpatient  - hold Prednisone 15 mg PO QD --> switched to Solu-cortef 75mg IV q8hr   - f/u hematology/oncology recs  - Monitor H&H daily.  - f/u hemolysis labs and peripheral smear    # DVT ppx  - therapeutic heparin gtt for Afib    ===================== RENAL =========================  NORMAN on CKD  ATN 2/2 septic shock  - Known hx of CKD, Cr now uptrending  - Scr ranged from 1.3-2.1 in 2023. Most recent Scr was 1.75 on 1/30/24. On admission, Scr was 1.9 and is worsening now. UA showed trace ketones.   - No improvement with IVF, likely ATN i/s/o contrast/medications.   - S/p double lumen L fem catheter  - CRRT net even  - c/w renvela for hyperphosphatemia  - CRRT as per nephro    #Hyperkalemia (resolved)   - K 6.2 at ED arrival  - shifted in the ED, no EKG changes consistent with severe hyper K. Cr not significantly elevated form baseline, c/f RTA from bactrim and Tacro. Stable   - s/p Lokelma 10mg BID for 3 days  - f/u Tacro level daily   - Hold home bactrim - ID consult regarding Atovaquone   - c/w monitoring patient on Tele.  - CRRT as above    ==================== GASTROINTESTINAL===================  #Ileus  #+/- coffee ground emesis  - Ileus could potentially be source of infx  - 1 episode of coffee ground emesis, unclear if true GIB  - Hb stable   - CTAP (2/22) ileus vs partial SBO - cannot r/o GI bleed  - NGT placed set to LIS -> minimal output x several days  - monitor for further episodes  - c/w PPI IV 40 BID   - c/w trending CBC Q12H  - bowel regimen: senna, miralax  - Restart DVT Ppx/ASA and heparin gtt for full AC for now; if no more GIB and HgB stable  - if repeat episode, GI consult   - If repeat episode or worsening lactate, CT angiogram with venous phase.  - 2/28 - trial trickle tube feeds    =======================    ENDOCRINE  =====================  # Diabetes type 2.   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 No manifest signs of bowel ischemia. Interval worsening of right lower lobe and right middle lobe atelectasis secondary to elevated right hemidiaphragm when compared to prior CT dated 7/11/2023. Superimposed infection in the collapsed lungs is not excluded  - s/p meropenem (2/21-2/24)  - Hold off additional doses of Vancomycin (MRSA swab negative)  - c/w home Valganciclovir for ppx renally dosed   - f/u infectious labs - Toxoplasma, EBV, Fungitell, Galactomannan, CMV, Cryptococcus, MRSA, ASCENCION virus, CMV, Crypto, ASCENCION virus  - f/u GI PCR, consider C. Diff testing   - Transplant ID recommendations   - Consider LP if MS not improving  - Warm blanket    Patient requires continuous monitoring with bedside rhythm monitoring, pulse ox monitoring, and intermittent blood gas analysis. Care plan discussed with ICU care team. Patient remained critical and at risk for life threatening decompensation.  Patient seen, examined and plan discussed with CCU team during rounds.     I have personally provided 35 minutes of critical care time excluding time spent on separate procedures, in addition to initial critical care time provided by the CICU Attending, Dr. Santiago.    Claudia Koch, [facility]

## 2025-03-01 NOTE — PROGRESS NOTE ADULT - ASSESSMENT
72yo M pmhx HTN, HLD, nonischemic cardiomyopathy, chronic systolic heart failure s/p HM2 LVAD (6/2017), s/p heart transplant from Hep. 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     Nephrology consulted for hyperkalemia and norman.     #NORMAN on CKD 3 a/w hyperkalemia  Patient with known history of CKD. Upon HIE review, Scr ranged from 1.3-2.1 in 2023. Most recent Scr was 1.75 on 1/30/24.   Scr is worsening now.  2.49 --->2.75--> 4.34--> 4.65---> 6.64 -->6.68 ---> 6.89.  pt was started on CRRT 2/23. Consent was obtained from pt's care taker - Tahira and it was placed in charts.   CPK is down trending. - Pt denies any seizure before coming to hospital. (Though not reliable history) - EEG ruled out seizures.   NORMAN likely in setting of underlying infection related and hemodynamic mediated ATN.  CRRT was initiated on 2/23.   Uremia is not the cause of his Change in mental status as his crt and BUN were baseline when he came in with this change in MS and worsened in the hospital.    PLAN:  C/w CRRT. Bags adjusted as per latest labs. Pt no longer on pressors.  Monitor labs, daily wts and I/Os.   Check tacro trough daily. - 7.4---> 2.4 - Immuno suppression as per primary team.   Pseudomonas bacteremia. ABx per Transplant ID  Dose medications as per CrCl ~25ml/min for now.       Cherie Alvarez, PGY-5  Nephrology Fellow  [employer] TEAMS preferred  (After 5pm or on weekends, please contact the fellow on call).

## 2025-03-01 NOTE — PROGRESS NOTE ADULT - PROBLEM SELECTOR PLAN 1
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 No manifest signs of bowel ischemia. Interval worsening of right lower lobe and right middle lobe atelectasis secondary to elevated right hemidiaphragm when compared to prior CT dated 7/11/2023. Superimposed infection in the collapsed lungs is not excluded  - s/p meropenem (2/21-2/24)  - c/w home Valganciclovir for ppx renally dosed   - f/u infectious labs - Toxoplasma, EBV, Fungitell, Galactomannan, CMV, Cryptococcus, MRSA, ASCENCION virus, CMV, Crypto, ASCENCION virus  - f/u GI PCR, consider C. 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 No manifest signs of bowel ischemia. Interval worsening of right lower lobe and right middle lobe atelectasis secondary to elevated right hemidiaphragm when compared to prior CT dated 7/11/2023. Superimposed infection in the collapsed lungs is not excluded  - s/p meropenem (2/21-2/24)  - c/w home Valganciclovir for ppx renally dosed   - f/u infectious labs - Toxoplasma, EBV, Fungitell, Galactomannan, CMV, Cryptococcus, MRSA, ASCENCION virus, CMV, Crypto, ASCENCION virus  - f/u GI panel negative  - Transplant ID recommendations   - Consider LP if MS not improving  - Warm blanket

## 2025-03-01 NOTE — PROGRESS NOTE ADULT - ASSESSMENT
73 year old male PMH HTN, HLD, CKD, nonischemic cardiomyopathy, chronic systolic heart failure s/p HM2 LVAD (6/2017), s/p heart transplant from Hep. 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    RVP (February 19) negative  Blood cultures (February 19) Pseudomonas koreensis  Blood cultures (February 22) no growth to date  Urine culture (February 19) 50-99K AHS  MRSA/MSSA nasal PCR (February 20th) negative    CMV PCR (February 22nd) negative  [place] PCR (February 21) negative  BK virus PCR (February 21) negative  Serum cryptococcal antigen (February 22) negative    RUQ US (2/21) Partially visualized porcelain gallbladder with cholelithiasis,    CT Chest (February 22) Interval worsening of right lower lobe and right middle lobe atelectasis secondary to elevated right hemidiaphragm when compared to prior CT dated 7/11/2023. Superimposed infection in the collapsed lungs is not excluded.    CT abdomen pelvis (February 22) Since prior study 2/22/2025 interval development of diffusely dilated small bowel loops with air-fluid levels. Contrast from prior study has passed into the colon. Findings are favored to represent ileus or less likely partial small bowel obstruction rather than mechanical obstruction. No manifest signs of bowel ischemia.    At this point suspect that Pseudomonas bacteremia was secondary to either a respiratory source or gastrointestinal translocation.  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    CT chest/abdomen/pelvis 2/26  Bronchial mucus plugging with right lower lobe atelectasis.  No CT evidence of occult abscess in the abdomen or pelvis.    MR Chest 2/28    IMPRESSION:  1.  Sternal wires are present without osseous fusion.  2.  Marrow appears preserved given the metallic artifact without evidence   for osteomyelitis.  3.  Sternoclavicular joints appear preserved.  4.  Edema of the right pectoralis along the manubrium and clavicle with   surrounding soft tissue edema. Changes may reflect cellulitis and   myositis with the given history versus injury.  5.  No abscess is seen.      Antimicrobials  Zosyn 2/19/ 2/20  Meropenem 2/20-2/24  Zosyn 2/25-    # AMS  # Pseudomonas bacteremia  # NORMAN on CRRT  --Encephalopathy; Likely multifactorial  --Plan for LP to r/o infectious etiology  -- Please record opening pressure, send Cell count, glucose, protein, routine gram stain and culture (bacterial, fungal), CSF PCR panel, HSV 1/2 PCR, CMV PCR, JCV PCR, Toxoplasma PCR, Cryptococcal CSF Antigen, WNV PCR and WNV Serology, VZV PCR   -- Switched Zosyn to Meropenem for empirical coverage and to reestablish CNS coverage  -- If there is delay in obtaining LP today would start Ganciclovir 2.5 mg/kg IV Q24H (if on CRRT) (For HSV 1/2 and CMV coverage)   --Will check repeat serum CMV PCR (last on 2/22 negative), Toxoplasma Serum PCR, HSV 1/2 Serum PCR, WNV Serology and PCR (albeit unlikely etiology)  -- Continue to follow CBC with diff  -- Continue to follow transaminases  --Continue to follow temperature curve  --Follow up on preliminary blood cultures    # Nodular lesion along manubrium  -- MR with underlying cellulitis/myositis  --  IV Vancomycin added    # Heart Transplant Recipient, Prophylactic Antibiotic  --Continue Atovaquone 1,500 mg PO Q24H for PCP PPx  --Continue Valcyte 450 mg M/W/F for CMV PPx    Discussed with attending    Ignacio Sepulveda MD, PGY-5  ID Fellow  [employer] Teams Preferred  After 5pm/weekends call [phone]

## 2025-03-01 NOTE — PROGRESS NOTE ADULT - SUBJECTIVE AND OBJECTIVE BOX
PATIENT:  BILLY Crocheron  [id number]    CHIEF COMPLAINT:  Patient is a 74y old male who presents with a chief complaint of Lethargy (01 Mar 2025 13:42)    INTERVAL HISTORY:  - Remains on CRRT    MEDICATIONS:  MEDICATIONS  (STANDING):  aspirin  chewable 81 milliGRAM(s) Oral daily  atorvastatin 10 milliGRAM(s) Oral at bedtime  atovaquone  Suspension 1500 milliGRAM(s) Oral daily  chlorhexidine 2% Cloths 1 Application(s) Topical daily  cholecalciferol 1000 Unit(s) Oral daily  CRRT Treatment    <Continuous>  dexMEDEtomidine Infusion 0.5 MICROgram(s)/kG/Hr (11.3 mL/Hr) IV Continuous <Continuous>  ganciclovir IVPB 230 milliGRAM(s) IV Intermittent every 24 hours  heparin  Infusion 1000 Unit(s)/Hr (7 mL/Hr) IV Continuous <Continuous>  hydrocortisone sodium succinate Injectable 75 milliGRAM(s) IV Push every 8 hours  insulin lispro (ADMELOG) corrective regimen sliding scale   SubCutaneous every 6 hours  meropenem  IVPB 1000 milliGRAM(s) IV Intermittent every 8 hours  metoprolol tartrate 25 milliGRAM(s) Oral two times a day  nystatin    Suspension 454417 Unit(s) Oral four times a day  pantoprazole  Injectable 40 milliGRAM(s) IV Push two times a day  Phoxillum Filtration BK 4 / 2.5 5000 milliLiter(s) (1000 mL/Hr) CRRT <Continuous>  polyethylene glycol 3350 17 Gram(s) Oral daily  PrismaSATE Dialysate BGK 4 / 2.5 5000 milliLiter(s) (1200 mL/Hr) CRRT <Continuous>  PrismaSOL Filtration BGK 0 / 2.5 5000 milliLiter(s) (200 mL/Hr) CRRT <Continuous>  senna 2 Tablet(s) Oral daily  tacrolimus    0.5 mG/mL Suspension 2.5 milliGRAM(s) Oral <User Schedule>  vancomycin  IVPB 750 milliGRAM(s) IV Intermittent every 12 hours    MEDICATIONS  (PRN):    ALLERGIES:  No Known Allergies    OBJECTIVE:  ICU Vital Signs Last 24 Hrs  T(C): 37.1 (01 Mar 2025 19:00), Max: 37.7 (01 Mar 2025 03:00)  T(F): 98.8 (01 Mar 2025 19:00), Max: 99.9 (01 Mar 2025 03:00)  HR: 141 (01 Mar 2025 20:00) (96 - 141)  BP: 134/82 (01 Mar 2025 20:00) (102/52 - 145/62)  BP(mean): 100 (01 Mar 2025 20:00) (75 - 100)  ABP: --  ABP(mean): --  RR: 29 (01 Mar 2025 20:00) (12 - 29)  SpO2: 100% (01 Mar 2025 20:00) (96% - 100%)    O2 Parameters below as of 01 Mar 2025 20:00  Patient On (Oxygen Delivery Method): room air    CAPILLARY BLOOD GLUCOSE  POCT Blood Glucose.: 125 mg/dL (01 Mar 2025 18:18)  POCT Blood Glucose.: 121 mg/dL (01 Mar 2025 11:55)  POCT Blood Glucose.: 109 mg/dL (01 Mar 2025 05:18)  POCT Blood Glucose.: 105 mg/dL (01 Mar 2025 00:20)    I&O's Summary    2025 07:01  -  01 Mar 2025 07:00  --------------------------------------------------------  IN: 1797.1 mL / OUT: 1392 mL / NET: 405.1 mL    01 Mar 2025 07:01  -  01 Mar 2025 21:15  --------------------------------------------------------  IN: 1234.6 mL / OUT: 991 mL / NET: 243.6 mL    Daily     Daily Weight in k.3 (01 Mar 2025 05:00)    LABS:                7.4    18.43 )-----------( 206      ( 01 Mar 2025 08:53 )             24.1     03-    137  |  102  |  17  ----------------------------<  118[H]  4.1   |  22  |  1.58[H]    Ca    7.6[L]      01 Mar 2025 18:01  Phos  2.5     03-01  Mg     2.5     03-01    TPro  5.2[L]  /  Alb  2.6[L]  /  TBili  0.8  /  DBili  x   /  AST  29  /  ALT  14  /  AlkPhos  51  03-01    LIVER FUNCTIONS - ( 01 Mar 2025 18:01 )  Alb: 2.6 g/dL / Pro: 5.2 g/dL / ALK PHOS: 51 U/L / ALT: 14 U/L / AST: 29 U/L / GGT: x           PT/INR - ( 01 Mar 2025 02:18 )   PT: 11.3 sec;   INR: 0.99 ratio    PTT - ( 01 Mar 2025 18:01 )  PTT:87.5 sec

## 2025-03-01 NOTE — PROGRESS NOTE ADULT - ASSESSMENT
73yo M w/ pmhx HTN, HLD, nonischemic cardiomyopathy, chronic systolic heart failure s/p HM2 LVAD (6/2017), s/p heart transplant from Hep. 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 Found to have septic shock, (2/19 BCx pseudo, 2/22 BCx NGTD). Still episodes of hypotension. Worsening NORMAN. Accepted to MICU for CRRT.  Now managed in CCU

## 2025-03-01 NOTE — PROGRESS NOTE ADULT - TIME BILLING
face-to-face encounter, review of extensive medical records in this and prior charts, laboratory findings, radiographic and microbiology results; documentation as noted above and discussion of diagnostic impressions and plan with the patient and team
- Review of records, telemetry, vital signs and daily labs.   - General and cardiovascular physical examination.  - Generation of cardiovascular treatment plan.  - Coordination of care with primary team.
review of labs, imaging, notes, discussion of plan with patient, family, and consultant
review of labs, imaging, notes, discussion of plan with patient, family, and consultant

## 2025-03-01 NOTE — PROGRESS NOTE ADULT - SUBJECTIVE AND OBJECTIVE BOX
[facility] DIVISION OF KIDNEY DISEASES AND HYPERTENSION --    Reason for consult: NORMAN requiring dialysis    24 hour events/subjective: Patient seen and examined at bedside in CVICU. Remains anuric and on CRRT, tolerating well. Sedated, unable to obtain ROS.      PAST HISTORY  --------------------------------------------------------------------------------  No significant changes to PMH, PSH, FHx, SHx, unless otherwise noted    ALLERGIES & MEDICATIONS  --------------------------------------------------------------------------------  Allergies    No Known Allergies      Standing Inpatient Medications  aspirin  chewable 81 milliGRAM(s) Oral daily  atorvastatin 10 milliGRAM(s) Oral at bedtime  atovaquone  Suspension 1500 milliGRAM(s) Oral daily  chlorhexidine 2% Cloths 1 Application(s) Topical daily  cholecalciferol 1000 Unit(s) Oral daily  CRRT Treatment    <Continuous>  dexMEDEtomidine Infusion 0.5 MICROgram(s)/kG/Hr IV Continuous <Continuous>  heparin  Infusion 1000 Unit(s)/Hr IV Continuous <Continuous>  hydrocortisone sodium succinate Injectable 75 milliGRAM(s) IV Push every 8 hours  insulin lispro (ADMELOG) corrective regimen sliding scale   SubCutaneous every 6 hours  meropenem  IVPB 1000 milliGRAM(s) IV Intermittent every 8 hours  nystatin    Suspension [id number] Unit(s) Oral four times a day  pantoprazole  Injectable 40 milliGRAM(s) IV Push two times a day  Phoxillum Filtration BK 4 / 2.5 5000 milliLiter(s) CRRT <Continuous>  polyethylene glycol 3350 17 Gram(s) Oral daily  PrismaSATE Dialysate BGK 4 / 2.5 5000 milliLiter(s) CRRT <Continuous>  PrismaSOL Filtration BGK 0 / 2.5 5000 milliLiter(s) CRRT <Continuous>  senna 2 Tablet(s) Oral daily  tacrolimus    0.5 mG/mL Suspension 2.5 milliGRAM(s) Oral <User Schedule>  valGANciclovir 50 mG/mL Oral Solution 450 milliGRAM(s) Oral <User Schedule>  vancomycin  IVPB 750 milliGRAM(s) IV Intermittent every 12 hours    PRN Inpatient Medications      REVIEW OF SYSTEMS  --------------------------------------------------------------------------------  Unable to obtain ROS due to patient's clinical condition.     VITALS/PHYSICAL EXAM  --------------------------------------------------------------------------------  T(C): 37.2 (03-01-25 @ 07:00), Max: 37.7 (03-01-25 @ 03:00)  HR: 107 (03-01-25 @ 07:00) (86 - 126)  BP: 129/60 (03-01-25 @ 07:00) (113/54 - 163/68)  RR: 23 (03-01-25 @ 07:00) (11 - 32)  SpO2: 100% (03-01-25 @ 07:00) (94% - 100%)  Wt(kg): --        02-28-25 @ 07:01  -  03-01-25 @ 07:00  --------------------------------------------------------  IN: 1760.8 mL / OUT: 1127 mL / NET: 633.8 mL      PHYSICAL EXAM:  Gen: ill-appearing but NAD  Pulm: CTA B/L  CV: S1S2  Abd: Soft, +BS   Ext: No LE edema B/L  Neuro: sedated  Skin: Warm and dry  Dialysis access: L-femoral non- tunnelled HD cath in place - being used for CRRT.     LABS/STUDIES  --------------------------------------------------------------------------------              7.4    17.23 >-----------<  192      [03-01-25 @ 02:15]              23.4     138  |  103  |  21  ----------------------------<  109      [03-01-25 @ 02:17]  4.4   |  22  |  1.76        Ca     7.5     [03-01-25 @ 02:17]      Mg     2.5     [03-01-25 @ 02:17]      Phos  2.7     [03-01-25 @ 02:17]    TPro  5.2  /  Alb  2.5  /  TBili  0.9  /  DBili  x   /  AST  28  /  ALT  13  /  AlkPhos  48  [03-01-25 @ 02:17]    PT/INR: PT 11.3 , INR 0.99       [03-01-25 @ 02:18]  PTT: 70.9       [03-01-25 @ 02:18]      Creatinine Trend:  SCr 1.76 [03-01 @ 02:17]  SCr 1.86 [02-28 @ 18:52]  SCr 1.64 [02-28 @ 06:24]  SCr 1.60 [02-28 @ 00:41]  SCr 1.63 [02-27 @ 17:23]    TSH 2.57      [02-19-25 @ 17:28]  Lipid: chol 95, TG 65, HDL 55, LDL --      [02-20-25 @ 05:35]      Syphilis Screen (Treponema Pallidum Ab) Negative      [02-21-25 @ 09:38]

## 2025-03-01 NOTE — PROGRESS NOTE ADULT - PROBLEM SELECTOR PLAN 10
- was on Eliquis 5 mg PO BID at home for hx of of afib and IJ thrombi  - Episode of coffee ground emesis as well  - temporarily on hold in anticipation for LP, now on hep gtt - was on Eliquis 5 mg PO BID at home for hx of of afib and IJ thrombi  - temporarily on hold in anticipation for LP, now on hep gtt

## 2025-03-01 NOTE — PROGRESS NOTE ADULT - PROBLEM SELECTOR PLAN 2
- was readmitted with worsening mental status, currently A&Ox1  - EEG unremarkable  - CT Angio head 2/19 was unremarkable how limited diagnostic study secondary to suboptimal opacification of the   intracranial arterial vasculature  - CT A/P unremarkable  - MRI chest: Metallic artifact, no evidence OM. Edema right pectroalis with surrounding soft tissue edema = cellulitis vs. myositis. No abscess seen  - no current plan for LP, no recurrent emesis or significant NG tube output x several days - was readmitted with worsening mental status, currently A&Ox1  - EEG unremarkable  - CT Angio head 2/19 was unremarkable how limited diagnostic study secondary to suboptimal opacification of the   intracranial arterial vasculature  - CT A/P unremarkable  - MRI chest: Metallic artifact, no evidence OM. Edema right pectroalis with surrounding soft tissue edema = cellulitis vs. myositis. No abscess seen  - Consider LP

## 2025-03-01 NOTE — PROGRESS NOTE ADULT - ATTENDING COMMENTS
Transfer from MICU to CICU care - case discussed with MICU team  Heart transplant recipient (2018)  Hypervolemic  Now with NORMAN on CKD - initiated HD  Heart Failure follow-up appreciated  ID input appreciated - MRI to evaluate for sternal wound infection; consider LP .

## 2025-03-01 NOTE — PROGRESS NOTE ADULT - PROBLEM SELECTOR PLAN 9
-+ coffee ground emesis   - CTAP (2/22) ileus vs partial SBO - cannot r/o GI bleed  - NGT placed set to LIS -> minimal output x several days  - On PPI -+ coffee ground emesis   - CTAP (2/22) ileus vs partial SBO - cannot r/o GI bleed  - NGT placed set to LIS -> minimal output x several days  - On PPI  - no recurrent emesis or significant NG tube output x several days

## 2025-03-01 NOTE — PROGRESS NOTE ADULT - ATTENDING COMMENTS
#juan f-atn  cont CRRT  #immunosuppression  moniotr tacro troughs  cont IS per tx team  #anemia  monitor hb trend  #hyperkalemia  monitor k trend

## 2025-03-01 NOTE — PROGRESS NOTE ADULT - ATTENDING COMMENTS
74-year-old male nonischemic cardiomyopathy status post OHT 2/23/2018 which was complicated by graft dysfunction/rejection who presents with altered mental status and found to be in septic shock.  Course has further been complicated by acute renal failure on [facility].  Neuro status without significant improvement.    Labs reviewed notable for leukocytosis and chronic/stable anemia.  Perfusion markers normal.  Chest MRI reviewed which was performed for concerning chest wall collection demonstrating edema of the right pec which may reflect cellulitis or myositis.  Cultures growing Pseudomonas and is on antibiotic as per transplant ID.    Etiology of AMS continues to be unclear.  Neurological workup demonstrated brain MRI with subacute infarct.  Recommended for LP but given critical illness has not been performed yet.  Will reach out to neuro IR.  AMS may just be due to critical illness.    Immunosuppression he is on tacrolimus 2.5 mg twice daily for goal 3-5.  Level today 2.5.  Continue same.  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    On exam he is warm and perfused.  He continues to be altered and only responsive to pain.  Volume status currently being managed by [facility].    Darren Perez MD  Interventional Cardiology/Advanced Heart Failure Transplant

## 2025-03-02 NOTE — PROGRESS NOTE ADULT - PROBLEM SELECTOR PLAN 10
- was on Eliquis 5 mg PO BID at home for hx of of afib and IJ thrombi  - temporarily on hold in anticipation for LP, now on hep gtt

## 2025-03-02 NOTE — PROGRESS NOTE ADULT - SUBJECTIVE AND OBJECTIVE BOX
BILLY RUSSELL  MRN-[id number]  Patient is a 74y old  Male who presents with a chief complaint of Lethargy (01 Mar 2025 21:14)    HPI:  73yo M pmhx HTN, HLD, nonischemic cardiomyopathy, chronic systolic heart failure s/p HM2 LVAD (6/2017), s/p heart transplant from Hep. 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 On the phone, the patient's godbrother mentioned that on 2/18, the patient was in the car with his godbrother and was disoriented asking to get off on the road 4 blocks before his house. In addition, a caretaker stated that when she spoke to Mr. Hameed on the phone at 11am on 2/18, he was doing well. However, when the caretaker visited 2 hours later at 1pm, the patient was disoriented and felt his legs were heavy. This prompted the patient to come to the hospital.     In the ED: Hypothermic 91.4, HR 80, /60, RA   Patient noted to be Hyperkalemic (6.2) with Mixed respiratory and metabolic acidosis pH 7.2. - Patient was given Calcium Gluconate 2g, D50/Insulin 5 unit, 40 mg IV lasix, Lokelma 10mg.   Vancomycin and Zosyn  (20 Feb 2025 07:21)      24 HOUR EVENTS:    REVIEW OF SYSTEMS:  CONSTITUTIONAL: No weakness, fevers or chills  EYES/ENT: No visual changes;  No vertigo or throat pain   NECK: No pain or stiffness  RESPIRATORY: No cough, wheezing, hemoptysis; No shortness of breath  CARDIOVASCULAR: No chest pain or palpitations  GASTROINTESTINAL: No abdominal or epigastric pain. No nausea, vomiting, or hematemesis; No diarrhea or constipation. No melena or hematochezia.  GENITOURINARY: No dysuria, frequency or hematuria  NEUROLOGICAL: No numbness or weakness  SKIN: No itching, rashes      ICU Vital Signs Last 24 Hrs  T(C): 37 (02 Mar 2025 03:00), Max: 37.3 (01 Mar 2025 11:00)  T(F): 98.6 (02 Mar 2025 03:00), Max: 99.1 (01 Mar 2025 11:00)  HR: 93 (02 Mar 2025 06:00) (85 - 141)  BP: 153/62 (02 Mar 2025 06:00) (102/52 - 153/62)  BP(mean): 89 (02 Mar 2025 06:00) (75 - 100)  RR: 14 (02 Mar 2025 06:00) (10 - 29)  SpO2: 97% (02 Mar 2025 06:00) (96% - 100%)    O2 Parameters below as of 02 Mar 2025 06:00  Patient On (Oxygen Delivery Method): room air      I&O's Summary    28 Feb 2025 07:01  -  01 Mar 2025 07:00  --------------------------------------------------------  IN: 1797.1 mL / OUT: 1392 mL / NET: 405.1 mL    01 Mar 2025 07:01  -  02 Mar 2025 06:44  --------------------------------------------------------  IN: 2184.6 mL / OUT: 1585 mL / NET: 599.6 mL      POCT Blood Glucose.: 133 mg/dL (01 Mar 2025 23:13)      PHYSICAL EXAM:  GENERAL: No acute distress, well-developed  HEAD:  Atraumatic, Normocephalic  EYES: EOMI, PERRLA, conjunctiva and sclera clear  NECK: Supple, no lymphadenopathy, no JVD  CHEST/LUNG: CTAB; No wheezes, rales, or rhonchi  HEART: Regular rate and rhythm. Normal S1/S2. No murmurs, rubs, or gallops  ABDOMEN: Soft, non-tender, non-distended; normal bowel sounds, no organomegaly  EXTREMITIES:  2+ peripheral pulses b/l, No clubbing, cyanosis, or edema  NEUROLOGY: A&O x 3, no focal deficits  SKIN: No rashes or lesions    ============================I/O===========================   I&O's Detail    28 Feb 2025 07:01  -  01 Mar 2025 07:00  --------------------------------------------------------  IN:    Dexmedetomidine: 487.1 mL    Enteral Tube Flush: 300 mL    Heparin: 120 mL    IV PiggyBack: 750 mL    IV PiggyBack: 20 mL    Nepro with Carb Steady: 120 mL  Total IN: 1797.1 mL    OUT:    Other (mL): 1392 mL  Total OUT: 1392 mL    Total NET: 405.1 mL      01 Mar 2025 07:01  -  02 Mar 2025 06:44  --------------------------------------------------------  IN:    Dexmedetomidine: 509.6 mL    Enteral Tube Flush: 360 mL    Heparin: 180 mL    IV PiggyBack: 105 mL    IV PiggyBack: 500 mL    Nepro with Carb Steady: 230 mL    PRBCs (Packed Red Blood Cells): 300 mL  Total IN: 2184.6 mL    OUT:    Other (mL): 1585 mL  Total OUT: 1585 mL    Total NET: 599.6 mL        ============================ LABS =========================                        6.5    16.40 )-----------( 205      ( 02 Mar 2025 01:48 )             21.0     03-02    137  |  104  |  16  ----------------------------<  160[H]  4.2   |  22  |  1.43[H]    Ca    7.4[L]      02 Mar 2025 05:55  Phos  2.9     03-02  Mg     2.4     03-02    TPro  4.9[L]  /  Alb  2.4[L]  /  TBili  0.9  /  DBili  x   /  AST  22  /  ALT  12  /  AlkPhos  48  03-02    LIVER FUNCTIONS - ( 02 Mar 2025 05:55 )  Alb: 2.4 g/dL / Pro: 4.9 g/dL / ALK PHOS: 48 U/L / ALT: 12 U/L / AST: 22 U/L / GGT: x           PT/INR - ( 02 Mar 2025 00:55 )   PT: 11.0 sec;   INR: 0.96 ratio         PTT - ( 02 Mar 2025 00:55 )  PTT:25.4 sec      Urinalysis Basic - ( 02 Mar 2025 05:55 )    Color: x / Appearance: x / SG: x / pH: x  Gluc: 160 mg/dL / Ketone: x  / Bili: x / Urobili: x   Blood: x / Protein: x / Nitrite: x   Leuk Esterase: x / RBC: x / WBC x   Sq Epi: x / Non Sq Epi: x / Bacteria: x                BILLY RUSSELL  MRN-[id number]  Patient is a 74y old  Male who presents with a chief complaint of Lethargy (01 Mar 2025 21:14)    HPI:  73yo M pmhx HTN, HLD, nonischemic cardiomyopathy, chronic systolic heart failure s/p HM2 LVAD (6/2017), s/p heart transplant from Hep. 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 On the phone, the patient's godbrother mentioned that on 2/18, the patient was in the car with his godbrother and was disoriented asking to get off on the road 4 blocks before his house. In addition, a caretaker stated that when she spoke to Mr. Hameed on the phone at 11am on 2/18, he was doing well. However, when the caretaker visited 2 hours later at 1pm, the patient was disoriented and felt his legs were heavy. This prompted the patient to come to the hospital.     In the ED: Hypothermic 91.4, HR 80, /60, RA   Patient noted to be Hyperkalemic (6.2) with Mixed respiratory and metabolic acidosis pH 7.2. - Patient was given Calcium Gluconate 2g, D50/Insulin 5 unit, 40 mg IV lasix, Lokelma 10mg.   Vancomycin and Zosyn  (20 Feb 2025 07:21)      24 HOUR EVENTS:  - S/p 1u PRBC overnight for Hgb 6.5, f/u repeat CBC this am  - Mental status waxing and waning  - CRRT net even  - Afebrile, WBC remains elevated    REVIEW OF SYSTEMS:  CONSTITUTIONAL: No weakness, fevers or chills  EYES/ENT: No visual changes;  No vertigo or throat pain   NECK: No pain or stiffness  RESPIRATORY: No cough, wheezing, hemoptysis; No shortness of breath  CARDIOVASCULAR: No chest pain or palpitations  GASTROINTESTINAL: No abdominal or epigastric pain. No nausea, vomiting, or hematemesis; No diarrhea or constipation. No melena or hematochezia.  GENITOURINARY: No dysuria, frequency or hematuria  NEUROLOGICAL: No numbness or weakness  SKIN: No itching, rashes      ICU Vital Signs Last 24 Hrs  T(C): 37 (02 Mar 2025 03:00), Max: 37.3 (01 Mar 2025 11:00)  T(F): 98.6 (02 Mar 2025 03:00), Max: 99.1 (01 Mar 2025 11:00)  HR: 93 (02 Mar 2025 06:00) (85 - 141)  BP: 153/62 (02 Mar 2025 06:00) (102/52 - 153/62)  BP(mean): 89 (02 Mar 2025 06:00) (75 - 100)  RR: 14 (02 Mar 2025 06:00) (10 - 29)  SpO2: 97% (02 Mar 2025 06:00) (96% - 100%)    O2 Parameters below as of 02 Mar 2025 06:00  Patient On (Oxygen Delivery Method): room air      I&O's Summary    28 Feb 2025 07:01  -  01 Mar 2025 07:00  --------------------------------------------------------  IN: 1797.1 mL / OUT: 1392 mL / NET: 405.1 mL    01 Mar 2025 07:01  -  02 Mar 2025 06:44  --------------------------------------------------------  IN: 2184.6 mL / OUT: 1585 mL / NET: 599.6 mL      POCT Blood Glucose.: 133 mg/dL (01 Mar 2025 23:13)      PHYSICAL EXAM:  GENERAL: No acute distress, well-developed  HEAD:  Atraumatic, Normocephalic  EYES: EOMI, PERRLA, conjunctiva and sclera clear  NECK: Supple, no lymphadenopathy, no JVD  CHEST/LUNG: CTAB; No wheezes, rales, or rhonchi  HEART: Regular rate and rhythm. Normal S1/S2. No murmurs, rubs, or gallops  ABDOMEN: Soft, non-tender, non-distended; normal bowel sounds, no organomegaly  EXTREMITIES:  2+ peripheral pulses b/l, No clubbing, cyanosis, or edema  NEUROLOGY: A&O x 3, no focal deficits  SKIN: No rashes or lesions    ============================I/O===========================   I&O's Detail    28 Feb 2025 07:01  -  01 Mar 2025 07:00  --------------------------------------------------------  IN:    Dexmedetomidine: 487.1 mL    Enteral Tube Flush: 300 mL    Heparin: 120 mL    IV PiggyBack: 750 mL    IV PiggyBack: 20 mL    Nepro with Carb Steady: 120 mL  Total IN: 1797.1 mL    OUT:    Other (mL): 1392 mL  Total OUT: 1392 mL    Total NET: 405.1 mL      01 Mar 2025 07:01  -  02 Mar 2025 06:44  --------------------------------------------------------  IN:    Dexmedetomidine: 509.6 mL    Enteral Tube Flush: 360 mL    Heparin: 180 mL    IV PiggyBack: 105 mL    IV PiggyBack: 500 mL    Nepro with Carb Steady: 230 mL    PRBCs (Packed Red Blood Cells): 300 mL  Total IN: 2184.6 mL    OUT:    Other (mL): 1585 mL  Total OUT: 1585 mL    Total NET: 599.6 mL        ============================ LABS =========================                        6.5    16.40 )-----------( 205      ( 02 Mar 2025 01:48 )             21.0     03-02    137  |  104  |  16  ----------------------------<  160[H]  4.2   |  22  |  1.43[H]    Ca    7.4[L]      02 Mar 2025 05:55  Phos  2.9     03-02  Mg     2.4     03-02    TPro  4.9[L]  /  Alb  2.4[L]  /  TBili  0.9  /  DBili  x   /  AST  22  /  ALT  12  /  AlkPhos  48  03-02    LIVER FUNCTIONS - ( 02 Mar 2025 05:55 )  Alb: 2.4 g/dL / Pro: 4.9 g/dL / ALK PHOS: 48 U/L / ALT: 12 U/L / AST: 22 U/L / GGT: x           PT/INR - ( 02 Mar 2025 00:55 )   PT: 11.0 sec;   INR: 0.96 ratio         PTT - ( 02 Mar 2025 00:55 )  PTT:25.4 sec      Urinalysis Basic - ( 02 Mar 2025 05:55 )    Color: x / Appearance: x / SG: x / pH: x  Gluc: 160 mg/dL / Ketone: x  / Bili: x / Urobili: x   Blood: x / Protein: x / Nitrite: x   Leuk Esterase: x / RBC: x / WBC x   Sq Epi: x / Non Sq Epi: x / Bacteria: x

## 2025-03-02 NOTE — PROGRESS NOTE ADULT - ASSESSMENT
75yo M w/ pmhx HTN, HLD, nonischemic cardiomyopathy, chronic systolic heart failure s/p HM2 LVAD (6/2017), s/p heart transplant from Hep. 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 Found to have septic shock, (2/19 BCx pseudo, 2/22 BCx NGTD). Still episodes of hypotension. Worsening NORMAN. Accepted to MICU for CRRT.  Now managed in CCU

## 2025-03-02 NOTE — PROGRESS NOTE ADULT - SUBJECTIVE AND OBJECTIVE BOX
Anthony Fang MD  Cardiology Fellow  [phone]  All Cardiology service information can be found 24/7 on [email or web address], password: [email or web address]    Patient seen and examined at bedside.  HR 90s-100s BP 120s-150s/50s-60s satting % Ra. Net positive 599cc  137/4.2 | 104/22 | 16/1.43 < 160   22/12 | 48 | 0.9 | 4.9/2.4  16.4 < 6.5 > 205  tacro lvl 2.5   Pending LP    Review Of Systems: No chest pain, shortness of breath, or palpitations            Current Meds:  aspirin  chewable 81 milliGRAM(s) Oral daily  atorvastatin 10 milliGRAM(s) Oral at bedtime  atovaquone  Suspension 1500 milliGRAM(s) Oral daily  chlorhexidine 2% Cloths 1 Application(s) Topical daily  cholecalciferol 1000 Unit(s) Oral daily  CRRT Treatment    <Continuous>  dexMEDEtomidine Infusion 0.5 MICROgram(s)/kG/Hr IV Continuous <Continuous>  ganciclovir IVPB 230 milliGRAM(s) IV Intermittent every 24 hours  heparin  Infusion 1000 Unit(s)/Hr IV Continuous <Continuous>  hydrocortisone sodium succinate Injectable 75 milliGRAM(s) IV Push every 8 hours  insulin lispro (ADMELOG) corrective regimen sliding scale   SubCutaneous every 6 hours  meropenem  IVPB 1000 milliGRAM(s) IV Intermittent every 8 hours  metoprolol tartrate 25 milliGRAM(s) Oral two times a day  nystatin    Suspension 090024 Unit(s) Oral four times a day  pantoprazole  Injectable 40 milliGRAM(s) IV Push two times a day  Phoxillum Filtration BK 4 / 2.5 5000 milliLiter(s) CRRT <Continuous>  polyethylene glycol 3350 17 Gram(s) Oral daily  PrismaSATE Dialysate BGK 4 / 2.5 5000 milliLiter(s) CRRT <Continuous>  PrismaSOL Filtration BGK 0 / 2.5 5000 milliLiter(s) CRRT <Continuous>  senna 2 Tablet(s) Oral daily  tacrolimus    0.5 mG/mL Suspension 2.5 milliGRAM(s) Oral <User Schedule>  vancomycin  IVPB 750 milliGRAM(s) IV Intermittent every 12 hours      Vitals:  T(F): 98.6 (03-02), Max: 99.1 (03-01)  HR: 93 (03-02) (85 - 141)  BP: 153/62 (03-02) (102/52 - 153/62)  RR: 14 (03-02)  SpO2: 97% (03-02)  I&O's Summary    28 Feb 2025 07:01  -  01 Mar 2025 07:00  --------------------------------------------------------  IN: 1797.1 mL / OUT: 1392 mL / NET: 405.1 mL    01 Mar 2025 07:01  -  02 Mar 2025 06:53  --------------------------------------------------------  IN: 2184.6 mL / OUT: 1585 mL / NET: 599.6 mL    Physical Exam:  Appearance: No acute distress  Eyes: PERRL, EOMI, pink conjunctiva  HEENT: Normal oral mucosa  Cardiovascular: tachycardic   Respiratory: Clear to auscultation bilaterally  Gastrointestinal: soft, non-tender, non-distended with normal bowel sounds  Musculoskeletal: No clubbing; no joint deformity   Neurologic: Non-focal  Lymphatic: No lymphadenopathy  Psychiatry: AAOx3, mood & affect appropriate  Skin: No rashes, ecchymoses, or cyanosis                        6.5    16.40 )-----------( 205      ( 02 Mar 2025 01:48 )             21.0     03-02    137  |  104  |  16  ----------------------------<  160[H]  4.2   |  22  |  1.43[H]    Ca    7.4[L]      02 Mar 2025 05:55  Phos  2.9     03-02  Mg     2.4     03-02    TPro  4.9[L]  /  Alb  2.4[L]  /  TBili  0.9  /  DBili  x   /  AST  22  /  ALT  12  /  AlkPhos  48  03-02    PT/INR - ( 02 Mar 2025 00:55 )   PT: 11.0 sec;   INR: 0.96 ratio         PTT - ( 02 Mar 2025 00:55 )  PTT:25.4 se    2/28 MRI: IMPRESSION:  1.  Sternal wires are present without osseous fusion.  2.  Marrow appears preserved given the metallic artifact without evidence   for osteomyelitis.  3.  Sternoclavicular joints appear preserved.  4.  Edema of the right pectoralis along the manubrium and clavicle with   surrounding soft tissue edema. Changes may reflect cellulitis and   myositis with the given history versus injury.  5.  No abscess is seen. Anthony Fang MD  Cardiology Fellow  [phone]  All Cardiology service information can be found 24/7 on [email or web address], password: [email or web address]    Patient seen and examined at bedside.  HR 90s-100s BP 120s-150s/50s-60s satting % Ra. Net positive 599cc  137/4.2 | 104/22 | 16/1.43 < 160   22/12 | 48 | 0.9 | 4.9/2.4  16.4 < 6.5 > 205  tacro lvl 2.5. Today's lvl pending  Pending LP    Review Of Systems: No chest pain, shortness of breath, or palpitations            Current Meds:  aspirin  chewable 81 milliGRAM(s) Oral daily  atorvastatin 10 milliGRAM(s) Oral at bedtime  atovaquone  Suspension 1500 milliGRAM(s) Oral daily  chlorhexidine 2% Cloths 1 Application(s) Topical daily  cholecalciferol 1000 Unit(s) Oral daily  CRRT Treatment    <Continuous>  dexMEDEtomidine Infusion 0.5 MICROgram(s)/kG/Hr IV Continuous <Continuous>  ganciclovir IVPB 230 milliGRAM(s) IV Intermittent every 24 hours  heparin  Infusion 1000 Unit(s)/Hr IV Continuous <Continuous>  hydrocortisone sodium succinate Injectable 75 milliGRAM(s) IV Push every 8 hours  insulin lispro (ADMELOG) corrective regimen sliding scale   SubCutaneous every 6 hours  meropenem  IVPB 1000 milliGRAM(s) IV Intermittent every 8 hours  metoprolol tartrate 25 milliGRAM(s) Oral two times a day  nystatin    Suspension 488447 Unit(s) Oral four times a day  pantoprazole  Injectable 40 milliGRAM(s) IV Push two times a day  Phoxillum Filtration BK 4 / 2.5 5000 milliLiter(s) CRRT <Continuous>  polyethylene glycol 3350 17 Gram(s) Oral daily  PrismaSATE Dialysate BGK 4 / 2.5 5000 milliLiter(s) CRRT <Continuous>  PrismaSOL Filtration BGK 0 / 2.5 5000 milliLiter(s) CRRT <Continuous>  senna 2 Tablet(s) Oral daily  tacrolimus    0.5 mG/mL Suspension 2.5 milliGRAM(s) Oral <User Schedule>  vancomycin  IVPB 750 milliGRAM(s) IV Intermittent every 12 hours      Vitals:  T(F): 98.6 (03-02), Max: 99.1 (03-01)  HR: 93 (03-02) (85 - 141)  BP: 153/62 (03-02) (102/52 - 153/62)  RR: 14 (03-02)  SpO2: 97% (03-02)  I&O's Summary    28 Feb 2025 07:01  -  01 Mar 2025 07:00  --------------------------------------------------------  IN: 1797.1 mL / OUT: 1392 mL / NET: 405.1 mL    01 Mar 2025 07:01  -  02 Mar 2025 06:53  --------------------------------------------------------  IN: 2184.6 mL / OUT: 1585 mL / NET: 599.6 mL    Physical Exam:  Appearance: No acute distress  Eyes: PERRL, EOMI, pink conjunctiva  HEENT: Normal oral mucosa  Cardiovascular: tachycardic   Respiratory: Clear to auscultation bilaterally  Gastrointestinal: soft, non-tender, non-distended with normal bowel sounds  Musculoskeletal: No clubbing; no joint deformity   Neurologic: Non-focal  Lymphatic: No lymphadenopathy  Psychiatry: AAOx3, mood & affect appropriate  Skin: No rashes, ecchymoses, or cyanosis                        6.5    16.40 )-----------( 205      ( 02 Mar 2025 01:48 )             21.0     03-02    137  |  104  |  16  ----------------------------<  160[H]  4.2   |  22  |  1.43[H]    Ca    7.4[L]      02 Mar 2025 05:55  Phos  2.9     03-02  Mg     2.4     03-02    TPro  4.9[L]  /  Alb  2.4[L]  /  TBili  0.9  /  DBili  x   /  AST  22  /  ALT  12  /  AlkPhos  48  03-02    PT/INR - ( 02 Mar 2025 00:55 )   PT: 11.0 sec;   INR: 0.96 ratio         PTT - ( 02 Mar 2025 00:55 )  PTT:25.4 se    2/28 MRI: IMPRESSION:  1.  Sternal wires are present without osseous fusion.  2.  Marrow appears preserved given the metallic artifact without evidence   for osteomyelitis.  3.  Sternoclavicular joints appear preserved.  4.  Edema of the right pectoralis along the manubrium and clavicle with   surrounding soft tissue edema. Changes may reflect cellulitis and   myositis with the given history versus injury.  5.  No abscess is seen. Anthony Fang MD  Cardiology Fellow  [phone]  All Cardiology service information can be found 24/7 on [email or web address], password: [email or web address]    Patient seen and examined at bedside.  HR 90s-100s BP 120s-150s/50s-60s satting % Ra. Net positive 599cc  137/4.2 | 104/22 | 16/1.43 < 160   22/12 | 48 | 0.9 | 4.9/2.4  16.4 < 6.5 > 205. Got a unit pRBC overnight  tacro lvl 2.5. Today's lvl pending  Pending LP    Review Of Systems: No chest pain, shortness of breath, or palpitations            Current Meds:  aspirin  chewable 81 milliGRAM(s) Oral daily  atorvastatin 10 milliGRAM(s) Oral at bedtime  atovaquone  Suspension 1500 milliGRAM(s) Oral daily  chlorhexidine 2% Cloths 1 Application(s) Topical daily  cholecalciferol 1000 Unit(s) Oral daily  CRRT Treatment    <Continuous>  dexMEDEtomidine Infusion 0.5 MICROgram(s)/kG/Hr IV Continuous <Continuous>  ganciclovir IVPB 230 milliGRAM(s) IV Intermittent every 24 hours  heparin  Infusion 1000 Unit(s)/Hr IV Continuous <Continuous>  hydrocortisone sodium succinate Injectable 75 milliGRAM(s) IV Push every 8 hours  insulin lispro (ADMELOG) corrective regimen sliding scale   SubCutaneous every 6 hours  meropenem  IVPB 1000 milliGRAM(s) IV Intermittent every 8 hours  metoprolol tartrate 25 milliGRAM(s) Oral two times a day  nystatin    Suspension 163834 Unit(s) Oral four times a day  pantoprazole  Injectable 40 milliGRAM(s) IV Push two times a day  Phoxillum Filtration BK 4 / 2.5 5000 milliLiter(s) CRRT <Continuous>  polyethylene glycol 3350 17 Gram(s) Oral daily  PrismaSATE Dialysate BGK 4 / 2.5 5000 milliLiter(s) CRRT <Continuous>  PrismaSOL Filtration BGK 0 / 2.5 5000 milliLiter(s) CRRT <Continuous>  senna 2 Tablet(s) Oral daily  tacrolimus    0.5 mG/mL Suspension 2.5 milliGRAM(s) Oral <User Schedule>  vancomycin  IVPB 750 milliGRAM(s) IV Intermittent every 12 hours      Vitals:  T(F): 98.6 (03-02), Max: 99.1 (03-01)  HR: 93 (03-02) (85 - 141)  BP: 153/62 (03-02) (102/52 - 153/62)  RR: 14 (03-02)  SpO2: 97% (03-02)  I&O's Summary    28 Feb 2025 07:01  -  01 Mar 2025 07:00  --------------------------------------------------------  IN: 1797.1 mL / OUT: 1392 mL / NET: 405.1 mL    01 Mar 2025 07:01  -  02 Mar 2025 06:53  --------------------------------------------------------  IN: 2184.6 mL / OUT: 1585 mL / NET: 599.6 mL    Physical Exam:  Appearance: No acute distress  Eyes: PERRL, EOMI, pink conjunctiva  HEENT: Normal oral mucosa  Cardiovascular: tachycardic   Respiratory: Clear to auscultation bilaterally  Gastrointestinal: soft, non-tender, non-distended with normal bowel sounds  Musculoskeletal: No clubbing; no joint deformity   Neurologic: Non-focal  Lymphatic: No lymphadenopathy  Psychiatry: AAOx3, mood & affect appropriate  Skin: No rashes, ecchymoses, or cyanosis                        6.5    16.40 )-----------( 205      ( 02 Mar 2025 01:48 )             21.0     03-02    137  |  104  |  16  ----------------------------<  160[H]  4.2   |  22  |  1.43[H]    Ca    7.4[L]      02 Mar 2025 05:55  Phos  2.9     03-02  Mg     2.4     03-02    TPro  4.9[L]  /  Alb  2.4[L]  /  TBili  0.9  /  DBili  x   /  AST  22  /  ALT  12  /  AlkPhos  48  03-02    PT/INR - ( 02 Mar 2025 00:55 )   PT: 11.0 sec;   INR: 0.96 ratio         PTT - ( 02 Mar 2025 00:55 )  PTT:25.4 se    2/28 MRI: IMPRESSION:  1.  Sternal wires are present without osseous fusion.  2.  Marrow appears preserved given the metallic artifact without evidence   for osteomyelitis.  3.  Sternoclavicular joints appear preserved.  4.  Edema of the right pectoralis along the manubrium and clavicle with   surrounding soft tissue edema. Changes may reflect cellulitis and   myositis with the given history versus injury.  5.  No abscess is seen.

## 2025-03-02 NOTE — PROGRESS NOTE ADULT - ASSESSMENT
72yo M pmhx HTN, HLD, nonischemic cardiomyopathy, chronic systolic heart failure s/p HM2 LVAD (6/2017), s/p heart transplant from Hep. 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     Nephrology consulted for hyperkalemia and norman.     #NORMAN on CKD 3 a/w hyperkalemia  Patient with known history of CKD. Upon HIE review, Scr ranged from 1.3-2.1 in 2023. Most recent Scr was 1.75 on 1/30/24.   Scr is worsening now.  2.49 --->2.75--> 4.34--> 4.65---> 6.64 -->6.68 ---> 6.89.  pt was started on CRRT 2/23. Consent was obtained from pt's care taker - Tahira and it was placed in charts.   CPK is down trending. - Pt denies any seizure before coming to hospital. (Though not reliable history) - EEG ruled out seizures.   NORMAN likely in setting of underlying infection related and hemodynamic mediated ATN.  CRRT was initiated on 2/23.   Uremia is not the cause of his Change in mental status as his Scr and BUN were baseline when he came in with this change in MS and worsened in the hospital.    PLAN:  C/w CRRT. Bags adjusted as per latest labs. Pt no longer on pressors.  Monitor labs, daily wts and I/Os.   Immunosuppression as per primary team.   Pseudomonas bacteremia. ABx per Transplant ID  Dose medications as per CrCl ~25ml/min while on CRRT.       Cherie Alvarez, PGY-5  Nephrology Fellow  MS TEAMS preferred  (After 5pm or on weekends, please contact the fellow on call).

## 2025-03-02 NOTE — PROGRESS NOTE ADULT - SUBJECTIVE AND OBJECTIVE BOX
PATIENT:  BILLY Anniston  [id number]    CHIEF COMPLAINT:  Patient is a 74y old male who presents with a chief complaint of Lethargy (02 Mar 2025 07:54)    INTERVAL HISTORY:  - Mental status remains poor    MEDICATIONS:  MEDICATIONS  (STANDING):  aspirin  chewable 81 milliGRAM(s) Oral daily  atorvastatin 10 milliGRAM(s) Oral at bedtime  atovaquone  Suspension 1500 milliGRAM(s) Oral daily  chlorhexidine 2% Cloths 1 Application(s) Topical daily  cholecalciferol 1000 Unit(s) Oral daily  CRRT Treatment    <Continuous>  dexMEDEtomidine Infusion 0.5 MICROgram(s)/kG/Hr (11.3 mL/Hr) IV Continuous <Continuous>  ganciclovir IVPB 230 milliGRAM(s) IV Intermittent every 24 hours  heparin  Infusion 1000 Unit(s)/Hr (8 mL/Hr) IV Continuous <Continuous>  hydrocortisone sodium succinate Injectable 75 milliGRAM(s) IV Push every 8 hours  insulin lispro (ADMELOG) corrective regimen sliding scale   SubCutaneous every 6 hours  meropenem  IVPB 1000 milliGRAM(s) IV Intermittent every 8 hours  metoprolol tartrate 25 milliGRAM(s) Oral two times a day  nystatin    Suspension 885255 Unit(s) Oral four times a day  pantoprazole  Injectable 40 milliGRAM(s) IV Push two times a day  Phoxillum Filtration BK 4 / 2.5 5000 milliLiter(s) (1000 mL/Hr) CRRT <Continuous>  polyethylene glycol 3350 17 Gram(s) Oral daily  PrismaSATE Dialysate BGK 4 / 2.5 5000 milliLiter(s) (1200 mL/Hr) CRRT <Continuous>  PrismaSOL Filtration BGK 0 / 2.5 5000 milliLiter(s) (200 mL/Hr) CRRT <Continuous>  senna 2 Tablet(s) Oral daily  tacrolimus    0.5 mG/mL Suspension 2.5 milliGRAM(s) Oral <User Schedule>  vancomycin  IVPB 750 milliGRAM(s) IV Intermittent every 12 hours    MEDICATIONS  (PRN):    ALLERGIES:  No Known Allergies    OBJECTIVE:  ICU Vital Signs Last 24 Hrs  T(C): 37.3 (02 Mar 2025 15:00), Max: 37.3 (02 Mar 2025 15:00)  T(F): 99.1 (02 Mar 2025 15:00), Max: 99.1 (02 Mar 2025 15:00)  HR: 94 (02 Mar 2025 19:00) (84 - 141)  BP: 153/65 (02 Mar 2025 19:00) (108/49 - 157/61)  BP(mean): 93 (02 Mar 2025 19:00) (69 - 100)  ABP: --  ABP(mean): --  RR: 28 (02 Mar 2025 19:00) (10 - 29)  SpO2: 100% (02 Mar 2025 19:00) (94% - 100%)    O2 Parameters below as of 02 Mar 2025 19:00  Patient On (Oxygen Delivery Method): room air    CAPILLARY BLOOD GLUCOSE  POCT Blood Glucose.: 142 mg/dL (02 Mar 2025 18:09)  POCT Blood Glucose.: 147 mg/dL (02 Mar 2025 11:34)  POCT Blood Glucose.: 133 mg/dL (01 Mar 2025 23:13)    I&O's Summary    01 Mar 2025 07:01  -  02 Mar 2025 07:00  --------------------------------------------------------  IN: 2235.6 mL / OUT: 1693 mL / NET: 542.6 mL    02 Mar 2025 07:01  -  02 Mar 2025 19:42  --------------------------------------------------------  IN: 1160.6 mL / OUT: 859 mL / NET: 301.6 mL    Daily     Daily Weight in k.6 (02 Mar 2025 06:00)    LABS:                    8.0    19.75 )-----------( 207      ( 02 Mar 2025 08:25 )             25.5     03-02    135  |  104  |  15  ----------------------------<  123[H]  4.0   |  24  |  1.40[H]    Ca    7.7[L]      02 Mar 2025 17:11  Phos  2.2     03-02  Mg     2.4     03-02    TPro  4.9[L]  /  Alb  2.6[L]  /  TBili  1.3[H]  /  DBili  x   /  AST  20  /  ALT  14  /  AlkPhos  51  03-02    LIVER FUNCTIONS - ( 02 Mar 2025 17:11 )  Alb: 2.6 g/dL / Pro: 4.9 g/dL / ALK PHOS: 51 U/L / ALT: 14 U/L / AST: 20 U/L / GGT: x           PT/INR - ( 02 Mar 2025 06:38 )   PT: 11.1 sec;   INR: 0.96 ratio    PTT - ( 02 Mar 2025 17:11 )  PTT:52.1 sec

## 2025-03-02 NOTE — PROGRESS NOTE ADULT - ASSESSMENT
====================ASSESSMENT ==============  73yo M w/ pmhx HTN, HLD, nonischemic cardiomyopathy, chronic systolic heart failure s/p HM2 LVAD (6/2017), s/p heart transplant from Hep. 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 Found to have septic shock, (2/19 BCx pseudo, 2/22 BCx NGTD). Still episodes of hypotension. Worsening NORMAN. Accepted to MICU for CRRT.    Plan:  ====================== NEUROLOGY=====================  # Metabolic encephalopathy.  - Likely multi-factorial etiologies: septic vs uremic vs hypercapnic, less likely meningitis  - Patient baseline AOx3. AOx1 at ED arrival, iso of infection vs metabolic derangements (uremic encephelopathy)  - CTH and angio w/o hemorrhage or stenosis   - TSH wnl / B12, CK 2200  - Fall precautions  - spot EEG 2/21: negative for seizures   - Currently A&O xO  - Cont. Low dose precedex gtt     Plan  - Monitor MS w/ abx and CRRT  - Neurology consulted  - c/w precedex gtt 1.0    ==================== RESPIRATORY======================  #Metabolic acidosis  - respiratory and metabolic acidosis VpH 7.17, PCO2 57 on admission  - pH improving, likely as respiratory component has improved  - Remains on room air    ====================CARDIOVASCULAR==================  #Septic shock from pseudomonas bacteremia  - POCUS reveals no evidence of  fluid overload  - Pt is also likely intravascular depleted with ileus and poor po intake.   - S/p IVF  - Patient volume up hold off IVF, now running net even on CRRT  - s/p levo, off 2/24    Plan  - F/u transplant ID recs - CMV ordered    # H/O heart transplant.   - Nonischemic cardiomyopathy, chronic systolic heart failure s/p HM2 LVAD (6/2017), s/p heart transplant from Hep. C donor (treated) 2/23/18   - Home Tacrolimus dose 2mg BID, Pred 15mg qd  - Intolerant of Cellcept due to leukopenia.  - Continue with home Pred 15mg qd  - Hold home Bactrim given hyperkalemia, now on Atovaquone for ppx  - ASA  - c/w home Atorvastatin (therapeutic interchange)   - HOLD home Metoprolol from 200mg with hold parameters due to hypotension (02/23)  - Tacro level daily : home regimen 2mg BID, c/w 0.8mg BID-> will adjust by level today     # Paroxysmal atrial fibrillation.   - Home regimen : Eliquis 5 mg PO BID at home for hx of of afib and IJ thrombi  - EKG on admission - nsr   - [id number]  = 3  - Last Dose : 2/20 AM   - Eliquis held early in week for possible LP and episode coffee ground emesis -> no current plan for LP, no recurrent emesis or significant NG tube output x several days  -  heparin gtt for AC    # Hypertension (chronic)  - Hold home Toprol  mg PO QD ISO hypotension  - Hold home Losartan 75 mg PO QD due to NORMAN & hypotension.    # Vascular disorder of lower extremity.   - Extremities appear cool and dry   - c/w Local wound care by podiatry   - Podiatry recommendations - pending SHANEKA studies, consider vascular evaluation if abnormal.    ===================HEMATOLOGIC/ONC ===================  # Hx of hemolytic anemia, Sanchez Syndrome  - hx of hemolytic anemia, follows with Dr. Rosario as outpatient  - hold Prednisone 15 mg PO QD --> switched to Solu-cortef 75mg IV q8hr   - f/u hematology/oncology recs  - Monitor H&H daily.  - f/u hemolysis labs and peripheral smear    # DVT ppx  - therapeutic heparin gtt for Afib    ===================== RENAL =========================  NORMAN on CKD  ATN 2/2 septic shock  - Known hx of CKD, Cr now uptrending  - Scr ranged from 1.3-2.1 in 2023. Most recent Scr was 1.75 on 1/30/24. On admission, Scr was 1.9 and is worsening now. UA showed trace ketones.   - No improvement with IVF, likely ATN i/s/o contrast/medications.   - S/p double lumen L fem catheter  - CRRT net even  - c/w renvela for hyperphosphatemia  - CRRT as per nephro    #Hyperkalemia (resolved)   - K 6.2 at ED arrival  - shifted in the ED, no EKG changes consistent with severe hyper K. Cr not significantly elevated form baseline, c/f RTA from bactrim and Tacro. Stable   - s/p Lokelma 10mg BID for 3 days  - f/u Tacro level daily   - Hold home bactrim - ID consult regarding Atovaquone   - c/w monitoring patient on Tele.  - CRRT as above    ==================== GASTROINTESTINAL===================  #Ileus  #+/- coffee ground emesis  - Ileus could potentially be source of infx  - 1 episode of coffee ground emesis, unclear if true GIB  - Hb stable   - CTAP (2/22) ileus vs partial SBO - cannot r/o GI bleed  - NGT placed set to LIS -> minimal output x several days  - monitor for further episodes  - c/w PPI IV 40 BID   - c/w trending CBC Q12H  - bowel regimen: senna, miralax  - Restart DVT Ppx/ASA and heparin gtt for full AC for now; if no more GIB and HgB stable  - if repeat episode, GI consult   - If repeat episode or worsening lactate, CT angiogram with venous phase.  - 2/28 - trial trickle tube feeds    =======================    ENDOCRINE  =====================  # Diabetes type 2.   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 No manifest signs of bowel ischemia. Interval worsening of right lower lobe and right middle lobe atelectasis secondary to elevated right hemidiaphragm when compared to prior CT dated 7/11/2023. Superimposed infection in the collapsed lungs is not excluded  - s/p meropenem (2/21-2/24)  - Hold off additional doses of Vancomycin (MRSA swab negative)  - c/w home Valganciclovir for ppx renally dosed   - f/u infectious labs - Toxoplasma, EBV, Fungitell, Galactomannan, CMV, Cryptococcus, MRSA, ASCENCION virus, CMV, Crypto, ASCENCION virus  - f/u GI PCR, consider C. Diff testing   - Transplant ID recommendations   - Consider LP if MS not improving  - Warm blanket    ======================= DISPOSITION  =====================  [X] Critical   [ ] Guarded    [ ] Stable    [X] Maintain in CICU  [ ] Downgrade to Telemetry  [ ] Discharge Home    Patient requires continuous monitoring with bedside rhythm monitoring, pulse ox monitoring, and intermittent blood gas analysis. Care plan discussed with ICU care team. Patient remained critical and at risk for life threatening decompensation.  Patient seen, examined and plan discussed with CCU team during rounds.

## 2025-03-02 NOTE — PROGRESS NOTE ADULT - PROBLEM SELECTOR PLAN 9
-+ coffee ground emesis   - CTAP (2/22) ileus vs partial SBO - cannot r/o GI bleed  - NGT placed set to LIS -> minimal output x several days  - On PPI  - no recurrent emesis or significant NG tube output x several days

## 2025-03-02 NOTE — PROGRESS NOTE ADULT - PROBLEM SELECTOR PLAN 4
- noted to have NORMAN on admission  - Nephrology following, appreciate recommendations  - Remains anuric, on CRRT (goal I=O)  - trend daily.

## 2025-03-02 NOTE — PROGRESS NOTE ADULT - PROBLEM SELECTOR PLAN 2
- was readmitted with worsening mental status, currently A&Ox1  - EEG unremarkable  - CT Angio head 2/19 was unremarkable how limited diagnostic study secondary to suboptimal opacification of the   intracranial arterial vasculature  - CT A/P unremarkable  - MRI chest: Metallic artifact, no evidence OM. Edema right pectroalis with surrounding soft tissue edema = cellulitis vs. myositis. No abscess seen  - Consider LP

## 2025-03-02 NOTE — PROGRESS NOTE ADULT - PROBLEM SELECTOR PLAN 1
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 No manifest signs of bowel ischemia. Interval worsening of right lower lobe and right middle lobe atelectasis secondary to elevated right hemidiaphragm when compared to prior CT dated 7/11/2023. Superimposed infection in the collapsed lungs is not excluded  - s/p Zosyn, switched to meropenem for CNS coverage  - Ganciclovir 2.5 mg/kg IV Q24H (if on CRRT) (For HSV 1/2 and CMV coverage)   - Repeating serum CMV PCR (last on 2/22 negative), Toxoplasma Serum PCR, HSV 1/2 Serum PCR, WNV Serology and PCR   - GI panel negative  - LP with neuroIR pending  - Appreciate Transplant ID recommendations  - Warm blanket

## 2025-03-02 NOTE — PROCEDURE NOTE - NSICDXPROCEDURE_GEN_ALL_CORE_FT
PROCEDURES:  Insertion, PICC, using imaging guidance, older than 5 years 02-Mar-2025 23:47:52  Claudia Koch  Ultrasound guided venous access 02-Mar-2025 23:47:59  Claudia Koch

## 2025-03-02 NOTE — PROGRESS NOTE ADULT - ASSESSMENT
====================ASSESSMENT ==============  75yo M w/ pmhx HTN, HLD, nonischemic cardiomyopathy, chronic systolic heart failure s/p HM2 LVAD (6/2017), s/p heart transplant from Hep. 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 Found to have septic shock, (2/19 BCx pseudo, 2/22 BCx NGTD). Still episodes of hypotension. Worsening NORMAN. Accepted to MICU for CRRT.    Plan:  ====================== NEUROLOGY=====================  # Metabolic encephalopathy  - Likely multi-factorial etiologies: septic vs uremic vs hypercapnic, less likely meningitis  - Patient baseline AOx3. AOx1 at ED arrival, iso of infection vs metabolic derangements  - CTH and angio w/o hemorrhage or stenosis   - TSH wnl / B12, CK 2200  - Fall precautions  - spot EEG 2/21: negative for seizures   - Currently A&Ox0-1  - c/w low dose precedex gtt for agitation  - Neuro following  - Monitor mental status per protocol    ==================== RESPIRATORY======================  - No active issues  - SpO2 WNL on room air    ====================CARDIOVASCULAR==================  # Septic shock from pseudomonas bacteremia  - POCUS reveals no evidence of  fluid overload  - s/p fluid resuscitation  - s/p levo, off 2/24  - Lactate WNL    # h/o heart transplant  - Nonischemic cardiomyopathy, chronic systolic heart failure s/p HM2 LVAD (6/2017), s/p heart transplant from Hep. C donor (treated) 2/23/18   - c/w tacro w/ daily levels  - Intolerant of Cellcept due to leukopenia  - c/w solucortef  - c/w Atovaquone for ppx  - c/w nystatin for ppx  - c/w ASA    # Paroxysmal atrial fibrillation.   - Home regimen : Eliquis 5 mg PO BID at home for hx of of afib and IJ thrombi  - EKG on admission - NSR  - c/w lopressor 25 BID  - c/w heparin gtt    # Hypertension (chronic)  - Holding home Losartan 75 mg PO QD iso shock    # Vascular disorder of lower extremity   - Extremities appear cool and dry   - c/w local wound care by podiatry   - Podiatry recommendations - pending SHANEKA studies, consider vascular evaluation if abnormal    ===================HEMATOLOGIC/ONC ===================  # Hx of hemolytic anemia, Nicole Syndrome  - Follows with Dr. Rosario as outpatient  - Prednisone 15 mg PO QD --> switched to Solu-cortef 75mg IV q8hr   - f/u hematology/oncology recs  - Monitor H&H daily  - f/u hemolysis labs and peripheral smear    # DVT ppx  - Heparin gtt    ===================== RENAL =========================  # NORAMN on CKD  # ATN 2/2 septic shock  - Scr ranged from 1.3-2.1 in 2023. Most recent Scr was 1.75 on 1/30/24. On admission, Scr was 1.9 >>> 6  - No improvement with IVF, likely ATN i/s/o contrast/medications  - c/w CRRT net even  - Nephro following    # Hyperkalemia (resolved)   - K 6.2 at ED arrival  - Shifted in the ED, no EKG changes consistent with severe hyper K. Cr not significantly elevated form baseline, c/f RTA from bactrim and Tacro. Stable   - s/p Lokelma 10mg BID for 3 days  - CRRT as above    ==================== GASTROINTESTINAL===================  # Ileus, resolved  #+/- coffee ground emesis, resolved  - 1 episode of coffee ground emesis, unclear if true GIB  - CTAP (2/22) ileus vs partial SBO - cannot r/o GI bleed  - c/w PPI IV 40 BID   - c/w trickle feeds  - Having bowel movements    =======================    ENDOCRINE  =====================  # DMT2  - A1c 5.8  - ISS    ========================INFECTIOUS DISEASE================  # Septic shock   # Hypothermia  # Bacteremia  - Presented with T 91.4F, WBC 21 concerning for possible occult infection   - U/A without LE or Nitrites, CXR without clear consolidation, MRSA negative Lower concern for meningitis at this moment  - 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 No manifest signs of bowel ischemia. Interval worsening of right lower lobe and right middle lobe atelectasis secondary to elevated right hemidiaphragm when compared to prior CT dated 7/11/2023. Superimposed infection in the collapsed lungs is not excluded  - s/p meropenem (2/21-2/24)  - Hold off additional doses of Vancomycin (MRSA swab negative)  - c/w ganciclovir  - c/w meropenem  - c/w vanco  - c/w atovaquone and nystatin for ppx  - f/u infectious labs - Toxoplasma, EBV, Fungitell, Galactomannan, CMV, Cryptococcus, MRSA, ASCENCION virus, CMV, Crypto, ASCENCION virus  - f/u GI PCR, consider C. Diff testing   - Transplant ID recommendations   - Consider LP if MS not improving    Patient requires continuous monitoring with bedside rhythm monitoring, pulse ox monitoring, and intermittent blood gas analysis. Care plan discussed with ICU care team. Patient remained critical and at risk for life threatening decompensation.  Patient seen, examined and plan discussed with CCU team during rounds.     I have personally provided 35 minutes of critical care time excluding time spent on separate procedures, in addition to initial critical care time provided by the CICU Attending, Dr. Santiago.    Claudia Koch, [facility]-BC

## 2025-03-02 NOTE — PROGRESS NOTE ADULT - SUBJECTIVE AND OBJECTIVE BOX
[facility] DIVISION OF KIDNEY DISEASES AND HYPERTENSION --    Reason for consult: NORMAN requiring dialysis    24 hour events/subjective: Patient seen and examined at bedside in CVICU. Remains anuric and on CRRT, tolerating well. Sedated, unable to obtain ROS.      PAST HISTORY  --------------------------------------------------------------------------------  No significant changes to PMH, PSH, FHx, SHx, unless otherwise noted    ALLERGIES & MEDICATIONS  --------------------------------------------------------------------------------  Allergies    No Known Allergies      Standing Inpatient Medications  aspirin  chewable 81 milliGRAM(s) Oral daily  atorvastatin 10 milliGRAM(s) Oral at bedtime  atovaquone  Suspension 1500 milliGRAM(s) Oral daily  chlorhexidine 2% Cloths 1 Application(s) Topical daily  cholecalciferol 1000 Unit(s) Oral daily  CRRT Treatment    <Continuous>  dexMEDEtomidine Infusion 0.5 MICROgram(s)/kG/Hr IV Continuous <Continuous>  ganciclovir IVPB 230 milliGRAM(s) IV Intermittent every 24 hours  heparin  Infusion 1000 Unit(s)/Hr IV Continuous <Continuous>  hydrocortisone sodium succinate Injectable 75 milliGRAM(s) IV Push every 8 hours  insulin lispro (ADMELOG) corrective regimen sliding scale   SubCutaneous every 6 hours  meropenem  IVPB 1000 milliGRAM(s) IV Intermittent every 8 hours  metoprolol tartrate 25 milliGRAM(s) Oral two times a day  nystatin    Suspension 533753 Unit(s) Oral four times a day  pantoprazole  Injectable 40 milliGRAM(s) IV Push two times a day  Phoxillum Filtration BK 4 / 2.5 5000 milliLiter(s) CRRT <Continuous>  polyethylene glycol 3350 17 Gram(s) Oral daily  PrismaSATE Dialysate BGK 4 / 2.5 5000 milliLiter(s) CRRT <Continuous>  PrismaSOL Filtration BGK 0 / 2.5 5000 milliLiter(s) CRRT <Continuous>  senna 2 Tablet(s) Oral daily  tacrolimus    0.5 mG/mL Suspension 2.5 milliGRAM(s) Oral <User Schedule>  vancomycin  IVPB 750 milliGRAM(s) IV Intermittent every 12 hours    PRN Inpatient Medications      REVIEW OF SYSTEMS  --------------------------------------------------------------------------------  Unable to obtain ROS due to patient's clinical condition.     VITALS/PHYSICAL EXAM  --------------------------------------------------------------------------------  T(C): 37 (03-02-25 @ 03:00), Max: 37.3 (03-01-25 @ 11:00)  HR: 88 (03-02-25 @ 07:00) (85 - 141)  BP: 157/61 (03-02-25 @ 07:00) (102/52 - 157/61)  RR: 26 (03-02-25 @ 07:00) (10 - 29)  SpO2: 99% (03-02-25 @ 07:00) (96% - 100%)  Wt(kg): --        03-01-25 @ 07:01  -  03-02-25 @ 07:00  --------------------------------------------------------  IN: 2235.6 mL / OUT: 1585 mL / NET: 650.6 mL      PHYSICAL EXAM:  Gen: ill-appearing but NAD  Pulm: CTA B/L  CV: S1S2  Abd: Soft, +BS   Ext: No LE edema B/L  Neuro: sedated  Skin: Warm and dry  Dialysis access: L-femoral non- tunnelled HD cath in place - being used for CRRT.     LABS/STUDIES  --------------------------------------------------------------------------------              6.5    16.40 >-----------<  205      [03-02-25 @ 01:48]              21.0     137  |  104  |  16  ----------------------------<  160      [03-02-25 @ 05:55]  4.2   |  22  |  1.43        Ca     7.4     [03-02-25 @ 05:55]      Mg     2.4     [03-02-25 @ 05:55]      Phos  2.9     [03-02-25 @ 05:55]    TPro  4.9  /  Alb  2.4  /  TBili  0.9  /  DBili  x   /  AST  22  /  ALT  12  /  AlkPhos  48  [03-02-25 @ 05:55]    PT/INR: PT 11.1 , INR 0.96       [03-02-25 @ 06:38]  PTT: 45.0       [03-02-25 @ 06:38]      Creatinine Trend:  SCr 1.43 [03-02 @ 05:55]  SCr 1.48 [03-02 @ 00:55]  SCr 1.58 [03-01 @ 18:01]  SCr 1.68 [03-01 @ 08:53]  SCr 1.76 [03-01 @ 02:17]    TSH 2.57      [02-19-25 @ 17:28]  Lipid: chol 95, TG 65, HDL 55, LDL --      [02-20-25 @ 05:35]      Syphilis Screen (Treponema Pallidum Ab) Negative      [02-21-25 @ 09:38]

## 2025-03-02 NOTE — PROGRESS NOTE ADULT - ATTENDING COMMENTS
#juan f-atn  cont CRRT  #immunosuppression  monitor tacro troughs  cont IS per tx team  #anemia  monitor hb trend  #hyperkalemia  monitor k trend .

## 2025-03-02 NOTE — PROGRESS NOTE ADULT - PROBLEM SELECTOR PLAN 8
- hx of hemolytic anemia, follows with Dr. Rosario as outpatient  - switched pred to now solucortef 75 q8h - hx of hemolytic anemia, follows with Dr. Rosario as outpatient  - switched pred to now solucortef 75 q8h  - 1u pRBC transfused overnight 3/1

## 2025-03-03 NOTE — PROGRESS NOTE ADULT - PROBLEM SELECTOR PLAN 7
- Meds on hold - was on Eliquis 5 mg PO BID at home for hx of of afib and IJ thrombi  -hep gtt while pending potential procedures

## 2025-03-03 NOTE — PROGRESS NOTE ADULT - ATTENDING COMMENTS
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 His cultures have cleared and he is now hemodynamically stable but has had persistent altered mental status of unclear etiology undergoing further workup. He has normal graft function (TTE 2/2025).     -Continue tacrolimus for goal trough 3-5. He is subtherapeutic today and will increase from 2.5 BID to 3.0 BID  -Continue steroids but wean from stress dose steroids to prednisone 15 mg daily   -Antibiotics per ID, currently on vanc/meropenem as well as ganciclovir to cover possible CNS causes. LP pending   -Neuro following re: altered mental status, no clear pathology on MRI. Eventual LP as above

## 2025-03-03 NOTE — PROGRESS NOTE ADULT - ASSESSMENT
====================ASSESSMENT ==============  75yo M w/ pmhx HTN, HLD, nonischemic cardiomyopathy, chronic systolic heart failure s/p HM2 LVAD (6/2017), s/p heart transplant from Hep. 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 Found to have septic shock, (2/19 BCx pseudo, 2/22 BCx NGTD). Still episodes of hypotension. Worsening NORMAN. Accepted to MICU for CRRT.    Plan:  ====================== NEUROLOGY=====================  # Metabolic encephalopathy  - Likely multi-factorial etiologies: septic vs uremic vs hypercapnic, less likely meningitis  - Patient baseline AOx3. AOx1 at ED arrival, iso of infection vs metabolic derangements  - CTH and angio w/o hemorrhage or stenosis   - Fall precautions  - spot EEG 2/21: negative for seizures   - Currently A&Ox0-1  - c/w precedex gtt for agitation  - Neuro following  - Monitor mental status per protocol  - Started on zyprexa PRN agitation 3/3    ==================== RESPIRATORY======================  - No active issues  - SpO2 WNL on room air    ====================CARDIOVASCULAR==================  # Septic shock from pseudomonas bacteremia  - POCUS reveals no evidence of  fluid overload  - s/p fluid resuscitation  - s/p levo, off 2/24  - Lactate WNL    # h/o heart transplant  - Nonischemic cardiomyopathy, chronic systolic heart failure s/p HM2 LVAD (6/2017), s/p heart transplant from Hep. C donor (treated) 2/23/18   - c/w tacro w/ daily levels  - Intolerant of Cellcept due to leukopenia  - c/w solucortef  - c/w Atovaquone for ppx  - c/w nystatin for ppx  - c/w ASA    # Paroxysmal atrial fibrillation.   - Home regimen : Eliquis 5 mg PO BID at home for hx of of afib and IJ thrombi  - EKG on admission - NSR  - c/w lopressor 25 BID  - c/w heparin gtt    # Hypertension (chronic)  - Holding home Losartan 75 mg PO QD iso shock    # Vascular disorder of lower extremity   - Extremities appear cool and dry   - c/w local wound care by podiatry   - Podiatry recommendations - pending SHANEKA studies, consider vascular evaluation if abnormal    ===================HEMATOLOGIC/ONC ===================  # Hx of hemolytic anemia, Nicole Syndrome  - Follows with Dr. Rosario as outpatient  - Prednisone 15 mg PO QD --> switched to Solu-cortef 75mg IV q8hr > back to prednisone 3/3  - f/u hematology/oncology recs  - Monitor H&H daily  - f/u hemolysis labs and peripheral smear    # DVT ppx  - Heparin gtt    ===================== RENAL =========================  # NORMAN on CKD  # ATN 2/2 septic shock  - Scr ranged from 1.3-2.1 in 2023. Most recent Scr was 1.75 on 1/30/24. On admission, Scr was 1.9 >>> 6  - No improvement with IVF, likely ATN i/s/o contrast/medications  - CRRT stopped 3/3 8pm, plan for iHD  - Nephro following    # Hyperkalemia (resolved)   - K 6.2 at ED arrival  - Shifted in the ED, no EKG changes consistent with severe hyper K. Cr not significantly elevated form baseline, c/f RTA from bactrim and Tacro. Stable   - s/p Lokelma 10mg BID for 3 days  - CRRT as above    ==================== GASTROINTESTINAL===================  # Ileus, resolved  #+/- coffee ground emesis, resolved  - 1 episode of coffee ground emesis, unclear if true GIB  - CTAP (2/22) ileus vs partial SBO - cannot r/o GI bleed  - c/w PPI IV 40 BID   - c/w trickle feeds > TFs advanced 3/4  - Having bowel movements    =======================    ENDOCRINE  =====================  # DMT2  - A1c 5.8  - ISS    ========================INFECTIOUS DISEASE================  # Septic shock   # Hypothermia  # Bacteremia  - Presented with T 91.4F, WBC 21 concerning for possible occult infection   - U/A without LE or Nitrites, CXR without clear consolidation, MRSA negative Lower concern for meningitis at this moment  - 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 No manifest signs of bowel ischemia. Interval worsening of right lower lobe and right middle lobe atelectasis secondary to elevated right hemidiaphragm when compared to prior CT dated 7/11/2023. Superimposed infection in the collapsed lungs is not excluded  - s/p meropenem (2/21-2/24)  - Hold off additional doses of Vancomycin (MRSA swab negative)  - c/w ganciclovir  - c/w meropenem  - c/w vanco  - c/w atovaquone and nystatin for ppx  - f/u infectious labs - Toxoplasma, EBV, Fungitell, Galactomannan, CMV, Cryptococcus, MRSA, ASCENCION virus, CMV, Crypto, ASCENCION virus  - f/u GI PCR, consider C. Diff testing   - Transplant ID recommendations   - Consider LP if MS not improving    Patient requires continuous monitoring with bedside rhythm monitoring, pulse ox monitoring, and intermittent blood gas analysis. Care plan discussed with ICU care team. Patient remained critical and at risk for life threatening decompensation.  Patient seen, examined and plan discussed with CCU team during rounds.     I have personally provided 35 minutes of critical care time excluding time spent on separate procedures, in addition to initial critical care time provided by the CICU Attending, Dr. Leigh.     By signing my name below, I, Estela Hameed, attest that this documentation has been prepared under the direction and in the presence of Claudia Kohc NP  Electronically signed: Katty Riggs, 03-03-25 @ 21:28    I, Claudia Koch NP, personally performed the services described in this documentation. all medical record entries made by the ramuibkurt were at my direction and in my presence. I have reviewed the chart and agree that the record reflects my personal performance and is accurate and complete  Electronically signed: Claudia Koch NP

## 2025-03-03 NOTE — PROGRESS NOTE ADULT - PROBLEM SELECTOR PLAN 1
- Presented with T 91.4F, WBC 21, pseudomonas bacteremia  - Bcx 2/20 w/ Pseudomonas koreensis, Ucx NGTD, repeat cultures 2/22 NGTD  - CT C/A/P: Diffusely dilated small bowel loops with air-fluid levels: ileus or less likely partial small bowel obstruction rather than mechanical obstruction. No manifest signs of bowel ischemia. Interval worsening of right lower lobe and right middle lobe atelectasis secondary to elevated right hemidiaphragm when compared to prior CT dated 7/11/2023. Superimposed infection in the collapsed lungs is not excluded  - s/p Zosyn, switched to meropenem for CNS coverage  - Ganciclovir 2.5 mg/kg IV Q24H (if on CRRT) (For HSV 1/2 and CMV coverage)   - Repeating serum CMV PCR (last on 2/22 negative), Toxoplasma Serum PCR, HSV 1/2 Serum PCR, WNV Serology and PCR   - consider LP to r/o meningitis  - Appreciate Transplant ID recommendations - s/p LVAD explant and OHT 2/23/18 with hepatitis c donor  - blood pressure meds on hold as below  - continue ASA 81 mg PO QD and atorva 10  - holding home bactrim due to hyperkalemia (was on Bactrim S/S PO M/W/F). Now atovaquone  - Ganciclovir 2.5 mg/kg IV Q24H (if on CRRT) (For HSV 1/2 and CMV coverage), continue nystatin  - continue nystatin 4 times daily  - Adjust prograf for goal 3-5  - increase tacro to 3.5mg BID  - prednisone at 15mg daily - s/p LVAD explant and OHT 2/23/18 with hepatitis c donor  - blood pressure meds on hold as below  - continue ASA 81 mg PO QD and atorva 10  - holding home bactrim due to hyperkalemia (was on Bactrim S/S PO M/W/F). Now atovaquone  - Ganciclovir 2.5 mg/kg IV Q24H (if on CRRT) (For HSV 1/2 and CMV coverage), continue nystatin  - continue nystatin 4 times daily  - Adjust prograf for goal 3-5  - increase tacro to 3mg BID  - prednisone at 15mg daily

## 2025-03-03 NOTE — PROGRESS NOTE ADULT - SUBJECTIVE AND OBJECTIVE BOX
INFECTIOUS DISEASES FOLLOW UP-- Sujey Lujan  [phone]    This is a follow up note for this  74yMale with  Hyperkalemia        ROS:  CONSTITUTIONAL:  No fever, good appetite  CARDIOVASCULAR:  No chest pain or palpitations  RESPIRATORY:  No dyspnea  GASTROINTESTINAL:  No nausea, vomiting, diarrhea, or abdominal pain  GENITOURINARY:  No dysuria  NEUROLOGIC:  No headache,     Allergies    No Known Allergies    Intolerances        ANTIBIOTICS/RELEVANT:  antimicrobials  atovaquone  Suspension 1500 milliGRAM(s) Oral daily  ganciclovir IVPB 230 milliGRAM(s) IV Intermittent every 24 hours  meropenem  IVPB 1000 milliGRAM(s) IV Intermittent every 8 hours  nystatin    Suspension [id number] Unit(s) Oral four times a day  vancomycin  IVPB 750 milliGRAM(s) IV Intermittent every 12 hours    immunologic:  tacrolimus    0.5 mG/mL Suspension 3 milliGRAM(s) Oral <User Schedule>    OTHER:  aspirin  chewable 81 milliGRAM(s) Oral daily  atorvastatin 10 milliGRAM(s) Oral at bedtime  chlorhexidine 2% Cloths 1 Application(s) Topical daily  cholecalciferol 1000 Unit(s) Oral daily  CRRT Treatment    <Continuous>  dexMEDEtomidine Infusion 0.5 MICROgram(s)/kG/Hr IV Continuous <Continuous>  heparin  Infusion 1000 Unit(s)/Hr IV Continuous <Continuous>  insulin lispro (ADMELOG) corrective regimen sliding scale   SubCutaneous every 6 hours  metoprolol tartrate 25 milliGRAM(s) Oral two times a day  OLANZapine Injectable 5 milliGRAM(s) IntraMuscular every 12 hours PRN  pantoprazole  Injectable 40 milliGRAM(s) IV Push two times a day  Phoxillum Filtration BK 4 / 2.5 5000 milliLiter(s) CRRT <Continuous>  Phoxillum Filtration BK 4 / 2.5 5000 milliLiter(s) CRRT <Continuous>  Phoxillum Filtration BK 4 / 2.5 5000 milliLiter(s) CRRT <Continuous>  polyethylene glycol 3350 17 Gram(s) Oral daily  predniSONE   Tablet 15 milliGRAM(s) Oral daily  senna 2 Tablet(s) Oral daily      Objective:  Vital Signs Last 24 Hrs  T(C): 37.3 (03 Mar 2025 14:00), Max: 37.3 (02 Mar 2025 19:00)  T(F): 99.2 (03 Mar 2025 14:00), Max: 99.2 (02 Mar 2025 19:00)  HR: 87 (03 Mar 2025 16:00) (83 - 130)  BP: 131/56 (03 Mar 2025 16:00) (123/57 - 160/70)  BP(mean): 80 (03 Mar 2025 16:00) (79 - 126)  RR: 13 (03 Mar 2025 16:00) (11 - 31)  SpO2: 94% (03 Mar 2025 16:00) (94% - 100%)    Parameters below as of 03 Mar 2025 16:00  Patient On (Oxygen Delivery Method): room air        PHYSICAL EXAM:  Constitutional:no acute distress  Eyes:WALT, EOMI  Ear/Nose/Throat: no oral lesions, 	  Respiratory: clear BL  Cardiovascular: S1S2  Gastrointestinal:soft, (+) BS, no tenderness  Extremities:no e/e/c  No Lymphadenopathy  IV sites not inflammed.    LABS:                        7.5    19.97 )-----------( 231      ( 03 Mar 2025 01:12 )             23.9     03-03    135  |  102  |  15  ----------------------------<  148[H]  3.9   |  24  |  1.40[H]    Ca    7.4[L]      03 Mar 2025 01:12  Phos  2.3     03-03  Mg     2.4     03-03    TPro  4.8[L]  /  Alb  2.4[L]  /  TBili  1.0  /  DBili  x   /  AST  23  /  ALT  13  /  AlkPhos  49  03-03    PT/INR - ( 03 Mar 2025 06:38 )   PT: 12.1 sec;   INR: 1.06 ratio         PTT - ( 03 Mar 2025 06:38 )  PTT:61.1 sec  Urinalysis Basic - ( 03 Mar 2025 01:12 )    Color: x / Appearance: x / SG: x / pH: x  Gluc: 148 mg/dL / Ketone: x  / Bili: x / Urobili: x   Blood: x / Protein: x / Nitrite: x   Leuk Esterase: x / RBC: x / WBC x   Sq Epi: x / Non Sq Epi: x / Bacteria: x        MICROBIOLOGY:            RECENT CULTURES:  02-28 @ 15:37  .Blood Blood  --  --  --    No growth at 48 Hours  --  02-27 @ 12:15  .Blood Blood  --  --  --    No growth at 4 days  --  02-25 @ 11:40  .Blood Blood-Peripheral  --  --  --    No growth at 5 days  --  02-25 @ 11:35  .Blood Blood-Peripheral  --  --  --    No growth at 5 days  --      RADIOLOGY & ADDITIONAL STUDIES:    < from: MR Chest w/wo IV Cont (02.28.25 @ 13:19) >  IMPRESSION:  1.  Sternal wires are present without osseous fusion.  2.  Marrow appears preserved given the metallic artifact without evidence   for osteomyelitis.  3.  Sternoclavicular joints appear preserved.  4.  Edema of the right pectoralis along the manubrium and clavicle with   surrounding soft tissue edema. Changes may reflect cellulitis and   myositis with the given history versus injury.  5.  No abscess is seen.    --- End of Report ---    < end of copied text >  < from: MR Head w/wo IV Cont (02.26.25 @ 23:19) >  IMPRESSION: Small subacute lacunar infarct within the right cerebellar   hemisphere/brachium pontis junction with associated cytotoxic edema.    No acute intracranial hemorrhage. No abnormal intracranial enhancement.    Multiple extensive patchy confluent nonspecific abnormal white matter   foci of T2/FLAIR prolongation statistically favoring microvascular type   changes.    --- End of Report ---    < end of copied text >  ASCENCION Virus PCR, Plasma (02.20.25 @ 21:38)    ASCENCION Virus PCR, Plasma: 162: Assay Range: 40 copies/mL to 1.00E+08 copies/mL  The limit of quantitation (LOQ) is 40 copies/mL. ASCENCION virus DNA  detected below the LOQ will be reported as Detected:<40 copies/mL.  This test was developed and its performance characteristics  determined by [facility]. It has not been cleared or approved  by the U.[place]. Food and Drug Administration. Results should be used in  conjunction with clinical findings, and should not form the sole  basis for a diagnosis or treatment decision.  ____________________________________________________________  Performed at:  [facility]  [address] [place] [address], [address]  [place] [address]  : Campbell Cabral, PhD TERESA (ABB)  CLIA # [id number]  FLAG Interpretation: A = Abnormal, H = High, L = Low copies/mL     INFECTIOUS DISEASES FOLLOW UP-- Sujey Lujan  [phone]    This is a follow up note for this  74yMale with  Hyperkalemia        ROS:  CONSTITUTIONAL:  No fever, sedated post removal of femoral line  CARDIOVASCULAR:  No chest pain or palpitations  RESPIRATORY:  No dyspnea  GASTROINTESTINAL:  No nausea, vomiting, diarrhea, or abdominal pain  GENITOURINARY:  No dysuria  NEUROLOGIC:  No headache,     Allergies    No Known Allergies    Intolerances        ANTIBIOTICS/RELEVANT:  antimicrobials  atovaquone  Suspension 1500 milliGRAM(s) Oral daily  ganciclovir IVPB 230 milliGRAM(s) IV Intermittent every 24 hours  meropenem  IVPB 1000 milliGRAM(s) IV Intermittent every 8 hours  nystatin    Suspension [id number] Unit(s) Oral four times a day  vancomycin  IVPB 750 milliGRAM(s) IV Intermittent every 12 hours    immunologic:  tacrolimus    0.5 mG/mL Suspension 3 milliGRAM(s) Oral <User Schedule>    OTHER:  aspirin  chewable 81 milliGRAM(s) Oral daily  atorvastatin 10 milliGRAM(s) Oral at bedtime  chlorhexidine 2% Cloths 1 Application(s) Topical daily  cholecalciferol 1000 Unit(s) Oral daily  CRRT Treatment    <Continuous>  dexMEDEtomidine Infusion 0.5 MICROgram(s)/kG/Hr IV Continuous <Continuous>  heparin  Infusion 1000 Unit(s)/Hr IV Continuous <Continuous>  insulin lispro (ADMELOG) corrective regimen sliding scale   SubCutaneous every 6 hours  metoprolol tartrate 25 milliGRAM(s) Oral two times a day  OLANZapine Injectable 5 milliGRAM(s) IntraMuscular every 12 hours PRN  pantoprazole  Injectable 40 milliGRAM(s) IV Push two times a day  Phoxillum Filtration BK 4 / 2.5 5000 milliLiter(s) CRRT <Continuous>  Phoxillum Filtration BK 4 / 2.5 5000 milliLiter(s) CRRT <Continuous>  Phoxillum Filtration BK 4 / 2.5 5000 milliLiter(s) CRRT <Continuous>  polyethylene glycol 3350 17 Gram(s) Oral daily  predniSONE   Tablet 15 milliGRAM(s) Oral daily  senna 2 Tablet(s) Oral daily      Objective:  Vital Signs Last 24 Hrs  T(C): 37.3 (03 Mar 2025 14:00), Max: 37.3 (02 Mar 2025 19:00)  T(F): 99.2 (03 Mar 2025 14:00), Max: 99.2 (02 Mar 2025 19:00)  HR: 87 (03 Mar 2025 16:00) (83 - 130)  BP: 131/56 (03 Mar 2025 16:00) (123/57 - 160/70)  BP(mean): 80 (03 Mar 2025 16:00) (79 - 126)  RR: 13 (03 Mar 2025 16:00) (11 - 31)  SpO2: 94% (03 Mar 2025 16:00) (94% - 100%)    Parameters below as of 03 Mar 2025 16:00  Patient On (Oxygen Delivery Method): room air        PHYSICAL EXAM:  Constitutional:no acute distress, sedated  Eyes:WALT, EOMI  Ear/Nose/Throat: no oral lesions, 	  Respiratory: clear BL  Cardiovascular: S1S2  Gastrointestinal:soft, (+) BS, no tenderness  Extremities:no e/e/c  No Lymphadenopathy  IV sites not inflammed.    LABS:                        7.5    19.97 )-----------( 231      ( 03 Mar 2025 01:12 )             23.9     03-03    135  |  102  |  15  ----------------------------<  148[H]  3.9   |  24  |  1.40[H]    Ca    7.4[L]      03 Mar 2025 01:12  Phos  2.3     03-03  Mg     2.4     03-03    TPro  4.8[L]  /  Alb  2.4[L]  /  TBili  1.0  /  DBili  x   /  AST  23  /  ALT  13  /  AlkPhos  49  03-03    PT/INR - ( 03 Mar 2025 06:38 )   PT: 12.1 sec;   INR: 1.06 ratio         PTT - ( 03 Mar 2025 06:38 )  PTT:61.1 sec  Urinalysis Basic - ( 03 Mar 2025 01:12 )    Color: x / Appearance: x / SG: x / pH: x  Gluc: 148 mg/dL / Ketone: x  / Bili: x / Urobili: x   Blood: x / Protein: x / Nitrite: x   Leuk Esterase: x / RBC: x / WBC x   Sq Epi: x / Non Sq Epi: x / Bacteria: x        MICROBIOLOGY:            RECENT CULTURES:  02-28 @ 15:37  .Blood Blood  --  --  --    No growth at 48 Hours  --  02-27 @ 12:15  .Blood Blood  --  --  --    No growth at 4 days  --  02-25 @ 11:40  .Blood Blood-Peripheral  --  --  --    No growth at 5 days  --  02-25 @ 11:35  .Blood Blood-Peripheral  --  --  --    No growth at 5 days  --      RADIOLOGY & ADDITIONAL STUDIES:    < from: MR Chest w/wo IV Cont (02.28.25 @ 13:19) >  IMPRESSION:  1.  Sternal wires are present without osseous fusion.  2.  Marrow appears preserved given the metallic artifact without evidence   for osteomyelitis.  3.  Sternoclavicular joints appear preserved.  4.  Edema of the right pectoralis along the manubrium and clavicle with   surrounding soft tissue edema. Changes may reflect cellulitis and   myositis with the given history versus injury.  5.  No abscess is seen.    --- End of Report ---    < end of copied text >  < from: MR Head w/wo IV Cont (02.26.25 @ 23:19) >  IMPRESSION: Small subacute lacunar infarct within the right cerebellar   hemisphere/brachium pontis junction with associated cytotoxic edema.    No acute intracranial hemorrhage. No abnormal intracranial enhancement.    Multiple extensive patchy confluent nonspecific abnormal white matter   foci of T2/FLAIR prolongation statistically favoring microvascular type   changes.    --- End of Report ---    < end of copied text >  ASCENCION Virus PCR, Plasma (02.20.25 @ 21:38)    ASCENCION Virus PCR, Plasma: 162: Assay Range: 40 copies/mL to 1.00E+08 copies/mL  The limit of quantitation (LOQ) is 40 copies/mL. ASCENCION virus DNA  detected below the LOQ will be reported as Detected:<40 copies/mL.  This test was developed and its performance characteristics  determined by [facility]. It has not been cleared or approved  by the U.S. Food and Drug Administration. Results should be used in  conjunction with clinical findings, and should not form the sole  basis for a diagnosis or treatment decision.  ____________________________________________________________  Performed at:  [facility]  [address] [place] [address], [address]  [place] [address]  : Campbell Cabral, PhD TERESA (ABB)  CLIA # [id number]  FLAG Interpretation: A = Abnormal, H = High, L = Low copies/mL

## 2025-03-03 NOTE — PROGRESS NOTE ADULT - SUBJECTIVE AND OBJECTIVE BOX
YVONNEBILLY  MRN-[id number]  Patient is a 74y old  Male who presents with a chief complaint of Lethargy (03 Mar 2025 17:28)    CHIEF COMPLAINT:  Patient is a 74y old male who presents with a chief complaint of Lethargy (02 Mar 2025 07:54)    INTERVAL HISTORY:  - Mental status remains poor    MEDICATIONS  (STANDING):  aspirin  chewable 81 milliGRAM(s) Oral daily  atorvastatin 10 milliGRAM(s) Oral at bedtime  atovaquone  Suspension 1500 milliGRAM(s) Oral daily  chlorhexidine 2% Cloths 1 Application(s) Topical daily  cholecalciferol 1000 Unit(s) Oral daily  CRRT Treatment    <Continuous>  dexMEDEtomidine Infusion 0.5 MICROgram(s)/kG/Hr (11.3 mL/Hr) IV Continuous <Continuous>  ganciclovir IVPB 230 milliGRAM(s) IV Intermittent every 24 hours  heparin  Infusion 1000 Unit(s)/Hr (8 mL/Hr) IV Continuous <Continuous>  insulin lispro (ADMELOG) corrective regimen sliding scale   SubCutaneous every 6 hours  meropenem  IVPB 1000 milliGRAM(s) IV Intermittent every 8 hours  metoprolol tartrate 25 milliGRAM(s) Oral two times a day  nystatin    Suspension 024326 Unit(s) Oral four times a day  pantoprazole  Injectable 40 milliGRAM(s) IV Push two times a day  Phoxillum Filtration BK 4 / 2.5 5000 milliLiter(s) (1000 mL/Hr) CRRT <Continuous>  Phoxillum Filtration BK 4 / 2.5 5000 milliLiter(s) (200 mL/Hr) CRRT <Continuous>  Phoxillum Filtration BK 4 / 2.5 5000 milliLiter(s) (1200 mL/Hr) CRRT <Continuous>  polyethylene glycol 3350 17 Gram(s) Oral daily  predniSONE   Tablet 15 milliGRAM(s) Oral daily  senna 2 Tablet(s) Oral daily  tacrolimus    0.5 mG/mL Suspension 3 milliGRAM(s) Oral <User Schedule>  vancomycin  IVPB 750 milliGRAM(s) IV Intermittent every 12 hours    MEDICATIONS  (PRN):  OLANZapine Injectable 5 milliGRAM(s) IntraMuscular every 12 hours PRN agitation    ALLERGIES:  No Known Allergies    OBJECTIVE:  ICU Vital Signs Last 24 Hrs  T(C): 36.6 (03 Mar 2025 19:00), Max: 37.3 (03 Mar 2025 14:00)  T(F): 97.8 (03 Mar 2025 19:00), Max: 99.2 (03 Mar 2025 14:00)  HR: 78 (03 Mar 2025 21:00) (78 - 118)  BP: 141/64 (03 Mar 2025 21:00) (121/58 - 160/70)  BP(mean): 92 (03 Mar 2025 21:00) (79 - 126)  ABP: --  ABP(mean): --  RR: 12 (03 Mar 2025 21:00) (11 - 31)  SpO2: 100% (03 Mar 2025 21:00) (93% - 100%)    O2 Parameters below as of 03 Mar 2025 21:00  Patient On (Oxygen Delivery Method): room air            CVP(mm Hg): --  CO: --  CI: --  PA: --  PA(mean): --  PA(direct): --  PCWP: --  LA: --  RA: --  SVR: --  SVRI: --  PVR: --  PVRI: --  I&O's Summary    02 Mar 2025 07:01  -  03 Mar 2025 07:00  --------------------------------------------------------  IN: 2184.6 mL / OUT: 2111 mL / NET: 73.6 mL    03 Mar 2025 07:01  -  03 Mar 2025 21:28  --------------------------------------------------------  IN: 1537.2 mL / OUT: 1349 mL / NET: 188.2 mL        CAPILLARY BLOOD GLUCOSE    ============================I/O===========================   I&O's Detail    02 Mar 2025 07:01  -  03 Mar 2025 07:00  --------------------------------------------------------  IN:    Dexmedetomidine: 719.6 mL    Enteral Tube Flush: 180 mL    Heparin: 185 mL    IV PiggyBack: 750 mL    IV PiggyBack: 110 mL    Nepro with Carb Steady: 240 mL  Total IN: 2184.6 mL    OUT:    Other (mL): 2111 mL  Total OUT: 2111 mL    Total NET: 73.6 mL      03 Mar 2025 07:01  -  03 Mar 2025 21:28  --------------------------------------------------------  IN:    Dexmedetomidine: 460.2 mL    Enteral Tube Flush: 375 mL    Heparin: 112 mL    IV PiggyBack: 35 mL    IV PiggyBack: 415 mL    Nepro with Carb Steady: 140 mL  Total IN: 1537.2 mL    OUT:    Other (mL): 1349 mL  Total OUT: 1349 mL    Total NET: 188.2 mL        ============================ LABS =========================                        7.5    19.97 )-----------( 231      ( 03 Mar 2025 01:12 )             23.9     03-03    135  |  102  |  15  ----------------------------<  148[H]  3.9   |  24  |  1.40[H]    Ca    7.4[L]      03 Mar 2025 01:12  Phos  2.3     03-03  Mg     2.4     03-03    TPro  4.8[L]  /  Alb  2.4[L]  /  TBili  1.0  /  DBili  x   /  AST  23  /  ALT  13  /  AlkPhos  49  03-03                LIVER FUNCTIONS - ( 03 Mar 2025 01:12 )  Alb: 2.4 g/dL / Pro: 4.8 g/dL / ALK PHOS: 49 U/L / ALT: 13 U/L / AST: 23 U/L / GGT: x           PT/INR - ( 03 Mar 2025 06:38 )   PT: 12.1 sec;   INR: 1.06 ratio         PTT - ( 03 Mar 2025 06:38 )  PTT:61.1 sec    Blood Gas Venous - Lactate: 0.7 mmol/L (03-03-25 @ 15:56)  Blood Gas Venous - Lactate: 0.8 mmol/L (03-03-25 @ 10:38)  Blood Gas Venous - Lactate: 4.0 mmol/L (03-03-25 @ 05:36)  Blood Gas Venous - Lactate: 2.1 mmol/L (03-02-25 @ 23:20)    Urinalysis Basic - ( 03 Mar 2025 01:12 )    Color: x / Appearance: x / SG: x / pH: x  Gluc: 148 mg/dL / Ketone: x  / Bili: x / Urobili: x   Blood: x / Protein: x / Nitrite: x   Leuk Esterase: x / RBC: x / WBC x   Sq Epi: x / Non Sq Epi: x / Bacteria: x      ======================MICRO/RAD/CARDIO=================  Telemetry: Reviewed   EKG: Reviewed  CXR: Reviewed  Culture: Reviewed   Echo: Limited Transthoracic Echo:   Patient name: BILLY RUSSELL  YOB: 1950   Age: 72 (M)   MR#: [id number]  Study Date: 3/17/2023  Location: [address] [place], [place] [id number]: Greg Mckeon EVA  Study quality: Technically fair  Referring Physician: Brittany Trevizo MD  Blood Pressure: 116/73 mmHg  Height: 170 cm  Weight: 73 kg  BSA: 1.8 m2  Heart Rate: 99 mmHg  ------------------------------------------------------------------------  PROCEDURE: Limited transthoracic echocardiogram with 2-D.  M-Mode and spectral and color flow Doppler.  INDICATION: Abnormal electrocardiogram (ECG) (EKG) (R94.31)  ------------------------------------------------------------------------  Dimensions:    Normal Values:  LA:            2.0 - 4.0 cm  Ao:            2.0 - 3.8 cm  SEPTUM: 1.1    0.6 - 1.2 cm  PWT:    1.1    0.6 - 1.1 cm  LVIDd:  3.7    3.0 - 5.6 cm  LVIDs:  2.5    1.8 - 4.0 cm  Derived variables:  LVMI: 70 g/m2  RWT: 0.59  Fractional short: 32 %  EF (Sherman Rule): 72 %  ------------------------------------------------------------------------  Observations:  Aortic Valve/Aorta:  Aortic Root: 3.5 cm.  Left Ventricle: Hyperdynamic left ventricular systolic  function. Normal left ventricular internal dimensions and  wall thicknesses.  Right Heart: Normal right atrium. Right ventricular  enlargement with normal right ventricular systolic  function. The RV measures 4.5cm at the base and 3.3cm mid  RV.  Pericardium/Pleura: Normal pericardium with no pericardial  effusion.  ------------------------------------------------------------------------  Conclusions:  1. Hyperdynamic left ventricular systolic function.  2. Right ventricular enlargement with normal right  ventricular systolic function. The RV measures 4.5cm at the  base and 3.3cm mid RV.  *** Compared with echocardiogram of3/3/2023, no  significant changes noted. There is improved RV  visualization in the current study.  ------------------------------------------------------------------------  Confirmed on  3/17/2023 - 16:29:36 by Shar Whitney M.D.  ------------------------------------------------------------------------ (03-17-23 @ 13:34)    Cath:   ====================ASSESSMENT ==============  75yo M w/ pmhx HTN, HLD, nonischemic cardiomyopathy, chronic systolic heart failure s/p HM2 LVAD (6/2017), s/p heart transplant from Hep. 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 Found to have septic shock, (2/19 BCx pseudo, 2/22 BCx NGTD). Still episodes of hypotension. Worsening NORMAN. Accepted to MICU for CRRT.    Plan:  ====================== NEUROLOGY=====================  # Metabolic encephalopathy  - Likely multi-factorial etiologies: septic vs uremic vs hypercapnic, less likely meningitis  - Patient baseline AOx3. AOx1 at ED arrival, iso of infection vs metabolic derangements  - CTH and angio w/o hemorrhage or stenosis   - TSH wnl / B12, CK 2200  - Fall precautions  - spot EEG 2/21: negative for seizures   - Currently A&Ox0-1  - c/w low dose precedex gtt for agitation  - Neuro following  - Monitor mental status per protocol    dexMEDEtomidine Infusion 0.5 MICROgram(s)/kG/Hr IV Continuous <Continuous>  OLANZapine Injectable 5 milliGRAM(s) IntraMuscular every 12 hours PRN    ==================== RESPIRATORY======================  - No active issues  - SpO2 WNL on room air    ====================CARDIOVASCULAR==================  # Septic shock from pseudomonas bacteremia  - POCUS reveals no evidence of  fluid overload  - s/p fluid resuscitation  - s/p levo, off 2/24  - Lactate WNL    # h/o heart transplant  - Nonischemic cardiomyopathy, chronic systolic heart failure s/p HM2 LVAD (6/2017), s/p heart transplant from Hep. C donor (treated) 2/23/18   - c/w tacro w/ daily levels  - Intolerant of Cellcept due to leukopenia  - c/w solucortef  - c/w Atovaquone for ppx  - c/w nystatin for ppx  - c/w ASA    # Paroxysmal atrial fibrillation.   - Home regimen : Eliquis 5 mg PO BID at home for hx of of afib and IJ thrombi  - EKG on admission - NSR  - c/w lopressor 25 BID  - c/w heparin gtt    # Hypertension (chronic)  - Holding home Losartan 75 mg PO QD iso shock    # Vascular disorder of lower extremity   - Extremities appear cool and dry   - c/w local wound care by podiatry   - Podiatry recommendations - pending SHANEKA studies, consider vascular evaluation if abnormal    metoprolol tartrate 25 milliGRAM(s) Oral two times a day    ===================HEMATOLOGIC/ONC ===================  # Hx of hemolytic anemia, Nicole Syndrome  - Follows with Dr. Rosario as outpatient  - Prednisone 15 mg PO QD --> switched to Solu-cortef 75mg IV q8hr   - f/u hematology/oncology recs  - Monitor H&H daily  - f/u hemolysis labs and peripheral smear    # DVT ppx  - Heparin gtt    aspirin  chewable 81 milliGRAM(s) Oral daily  heparin  Infusion 1000 Unit(s)/Hr IV Continuous <Continuous>    ===================== RENAL =========================  # NORMAN on CKD  # ATN 2/2 septic shock  - Scr ranged from 1.3-2.1 in 2023. Most recent Scr was 1.75 on 1/30/24. On admission, Scr was 1.9 >>> 6  - No improvement with IVF, likely ATN i/s/o contrast/medications  - c/w CRRT net even  - Nephro following    # Hyperkalemia (resolved)   - K 6.2 at ED arrival  - Shifted in the ED, no EKG changes consistent with severe hyper K. Cr not significantly elevated form baseline, c/f RTA from bactrim and Tacro. Stable   - s/p Lokelma 10mg BID for 3 days  - CRRT as above    ==================== GASTROINTESTINAL===================  # Ileus, resolved  #+/- coffee ground emesis, resolved  - 1 episode of coffee ground emesis, unclear if true GIB  - CTAP (2/22) ileus vs partial SBO - cannot r/o GI bleed  - c/w PPI IV 40 BID   - c/w trickle feeds  - Having bowel movements    pantoprazole  Injectable 40 milliGRAM(s) IV Push two times a day  polyethylene glycol 3350 17 Gram(s) Oral daily  senna 2 Tablet(s) Oral daily    =======================    ENDOCRINE  =====================  # DMT2  - A1c 5.8  - ISS    atorvastatin 10 milliGRAM(s) Oral at bedtime  insulin lispro (ADMELOG) corrective regimen sliding scale   SubCutaneous every 6 hours  predniSONE   Tablet 15 milliGRAM(s) Oral daily    ========================INFECTIOUS DISEASE================  # Septic shock   # Hypothermia  # Bacteremia  - Presented with T 91.4F, WBC 21 concerning for possible occult infection   - U/A without LE or Nitrites, CXR without clear consolidation, MRSA negative Lower concern for meningitis at this moment  - 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 No manifest signs of bowel ischemia. Interval worsening of right lower lobe and right middle lobe atelectasis secondary to elevated right hemidiaphragm when compared to prior CT dated 7/11/2023. Superimposed infection in the collapsed lungs is not excluded  - s/p meropenem (2/21-2/24)  - Hold off additional doses of Vancomycin (MRSA swab negative)  - c/w ganciclovir  - c/w meropenem  - c/w vanco  - c/w atovaquone and nystatin for ppx  - f/u infectious labs - Toxoplasma, EBV, Fungitell, Galactomannan, CMV, Cryptococcus, MRSA, ASCENCION virus, CMV, Crypto, ASCENCION virus  - f/u GI PCR, consider C. Diff testing   - Transplant ID recommendations   - Consider LP if MS not improving    tacrolimus    0.5 mG/mL Suspension 3 milliGRAM(s) Oral <User Schedule>    atovaquone  Suspension 1500 milliGRAM(s) Oral daily  ganciclovir IVPB 230 milliGRAM(s) IV Intermittent every 24 hours  meropenem  IVPB 1000 milliGRAM(s) IV Intermittent every 8 hours  nystatin    Suspension 818247 Unit(s) Oral four times a day  vancomycin  IVPB 750 milliGRAM(s) IV Intermittent every 12 hours    chlorhexidine 2% Cloths 1 Application(s) Topical daily      Patient requires continuous monitoring with bedside rhythm monitoring, pulse ox monitoring, and intermittent blood gas analysis. Care plan discussed with ICU care team. Patient remained critical and at risk for life threatening decompensation.  Patient seen, examined and plan discussed with CCU team during rounds.     I have personally provided ____ minutes of critical care time excluding time spent on separate procedures, in addition to initial critical care time provided by the CICU Attending, Dr. Leigh.     By signing my name below, I, Estela Hameed, attest that this documentation has been prepared under the direction and in the presence of Claudia Koch NP  Electronically signed: Katty Riggs, 03-03-25 @ 21:28    I, Claudia Koch NP, personally performed the services described in this documentation. all medical record entries made by the ramuibe were at my direction and in my presence. I have reviewed the chart and agree that the record reflects my personal performance and is accurate and complete  Electronically signed: Claudia Koch NP       YVONNEBILLY  MRN-[id number]  Patient is a 74y old  Male who presents with a chief complaint of Lethargy (03 Mar 2025 17:28)    CHIEF COMPLAINT:  Patient is a 74y old male who presents with a chief complaint of Lethargy (02 Mar 2025 07:54)    INTERVAL HISTORY:  - CRRT on hold, plan to transition to iHD    MEDICATIONS  (STANDING):  aspirin  chewable 81 milliGRAM(s) Oral daily  atorvastatin 10 milliGRAM(s) Oral at bedtime  atovaquone  Suspension 1500 milliGRAM(s) Oral daily  chlorhexidine 2% Cloths 1 Application(s) Topical daily  cholecalciferol 1000 Unit(s) Oral daily  CRRT Treatment    <Continuous>  dexMEDEtomidine Infusion 0.5 MICROgram(s)/kG/Hr (11.3 mL/Hr) IV Continuous <Continuous>  ganciclovir IVPB 230 milliGRAM(s) IV Intermittent every 24 hours  heparin  Infusion 1000 Unit(s)/Hr (8 mL/Hr) IV Continuous <Continuous>  insulin lispro (ADMELOG) corrective regimen sliding scale   SubCutaneous every 6 hours  meropenem  IVPB 1000 milliGRAM(s) IV Intermittent every 8 hours  metoprolol tartrate 25 milliGRAM(s) Oral two times a day  nystatin    Suspension 289053 Unit(s) Oral four times a day  pantoprazole  Injectable 40 milliGRAM(s) IV Push two times a day  Phoxillum Filtration BK 4 / 2.5 5000 milliLiter(s) (1000 mL/Hr) CRRT <Continuous>  Phoxillum Filtration BK 4 / 2.5 5000 milliLiter(s) (200 mL/Hr) CRRT <Continuous>  Phoxillum Filtration BK 4 / 2.5 5000 milliLiter(s) (1200 mL/Hr) CRRT <Continuous>  polyethylene glycol 3350 17 Gram(s) Oral daily  predniSONE   Tablet 15 milliGRAM(s) Oral daily  senna 2 Tablet(s) Oral daily  tacrolimus    0.5 mG/mL Suspension 3 milliGRAM(s) Oral <User Schedule>  vancomycin  IVPB 750 milliGRAM(s) IV Intermittent every 12 hours    MEDICATIONS  (PRN):  OLANZapine Injectable 5 milliGRAM(s) IntraMuscular every 12 hours PRN agitation    ALLERGIES:  No Known Allergies    OBJECTIVE:  ICU Vital Signs Last 24 Hrs  T(C): 36.6 (03 Mar 2025 19:00), Max: 37.3 (03 Mar 2025 14:00)  T(F): 97.8 (03 Mar 2025 19:00), Max: 99.2 (03 Mar 2025 14:00)  HR: 78 (03 Mar 2025 21:00) (78 - 118)  BP: 141/64 (03 Mar 2025 21:00) (121/58 - 160/70)  BP(mean): 92 (03 Mar 2025 21:00) (79 - 126)  ABP: --  ABP(mean): --  RR: 12 (03 Mar 2025 21:00) (11 - 31)  SpO2: 100% (03 Mar 2025 21:00) (93% - 100%)    O2 Parameters below as of 03 Mar 2025 21:00  Patient On (Oxygen Delivery Method): room air    I&O's Summary    02 Mar 2025 07:01  -  03 Mar 2025 07:00  --------------------------------------------------------  IN: 2184.6 mL / OUT: 2111 mL / NET: 73.6 mL    03 Mar 2025 07:01  -  03 Mar 2025 21:28  --------------------------------------------------------  IN: 1537.2 mL / OUT: 1349 mL / NET: 188.2 mL    ============================I/O===========================   I&O's Detail    02 Mar 2025 07:01  -  03 Mar 2025 07:00  --------------------------------------------------------  IN:    Dexmedetomidine: 719.6 mL    Enteral Tube Flush: 180 mL    Heparin: 185 mL    IV PiggyBack: 750 mL    IV PiggyBack: 110 mL    Nepro with Carb Steady: 240 mL  Total IN: 2184.6 mL    OUT:    Other (mL): 2111 mL  Total OUT: 2111 mL    Total NET: 73.6 mL    03 Mar 2025 07:01  -  03 Mar 2025 21:28  --------------------------------------------------------  IN:    Dexmedetomidine: 460.2 mL    Enteral Tube Flush: 375 mL    Heparin: 112 mL    IV PiggyBack: 35 mL    IV PiggyBack: 415 mL    Nepro with Carb Steady: 140 mL  Total IN: 1537.2 mL    OUT:    Other (mL): 1349 mL  Total OUT: 1349 mL    Total NET: 188.2 mL    ============================ LABS =========================                        7.5    19.97 )-----------( 231      ( 03 Mar 2025 01:12 )             23.9     03-03    135  |  102  |  15  ----------------------------<  148[H]  3.9   |  24  |  1.40[H]    Ca    7.4[L]      03 Mar 2025 01:12  Phos  2.3     03-03  Mg     2.4     03-03    TPro  4.8[L]  /  Alb  2.4[L]  /  TBili  1.0  /  DBili  x   /  AST  23  /  ALT  13  /  AlkPhos  49  03-03    LIVER FUNCTIONS - ( 03 Mar 2025 01:12 )  Alb: 2.4 g/dL / Pro: 4.8 g/dL / ALK PHOS: 49 U/L / ALT: 13 U/L / AST: 23 U/L / GGT: x           PT/INR - ( 03 Mar 2025 06:38 )   PT: 12.1 sec;   INR: 1.06 ratio    PTT - ( 03 Mar 2025 06:38 )  PTT:61.1 sec    Blood Gas Venous - Lactate: 0.7 mmol/L (03-03-25 @ 15:56)  Blood Gas Venous - Lactate: 0.8 mmol/L (03-03-25 @ 10:38)  Blood Gas Venous - Lactate: 4.0 mmol/L (03-03-25 @ 05:36)  Blood Gas Venous - Lactate: 2.1 mmol/L (03-02-25 @ 23:20)

## 2025-03-03 NOTE — PROGRESS NOTE ADULT - PROBLEM SELECTOR PLAN 5
- s/p LVAD explant and OHT 2/23/18 with hepatitis c donor  - blood pressure meds on hold as below  - continue ASA 81 mg PO QD and atorva 10  - holding home bactrim due to hyperkalemia (was on Bactrim S/S PO M/W/F). Now atovaquone  - continue nystatin 4 times daily. - hx of hemolytic anemia, follows with Dr. Rosario as outpatient  - cont prednisone 15mg daily  - 1u pRBC transfused overnight 3/1

## 2025-03-03 NOTE — PROGRESS NOTE ADULT - SUBJECTIVE AND OBJECTIVE BOX
Patient seen and examined at bedside.    Overnight Events: still altered    REVIEW OF SYSTEMS:  CONSTITUTIONAL: No weakness, fevers or chills  EYES/ENT: No visual changes;  No dysphagia  NECK: No pain or stiffness  RESPIRATORY: No cough, wheezing, hemoptysis; No shortness of breath  CARDIOVASCULAR: No chest pain or palpitations; No lower extremity edema  GASTROINTESTINAL: No abdominal or epigastric pain. No nausea, vomiting, or hematemesis; No diarrhea or constipation. No melena or hematochezia.  BACK: No back pain  GENITOURINARY: No dysuria, frequency or hematuria  NEUROLOGICAL: No numbness or weakness  SKIN: No itching, burning, rashes, or lesions   All other review of systems is negative unless indicated above.            Current Meds:  aspirin  chewable 81 milliGRAM(s) Oral daily  atorvastatin 10 milliGRAM(s) Oral at bedtime  atovaquone  Suspension 1500 milliGRAM(s) Oral daily  chlorhexidine 2% Cloths 1 Application(s) Topical daily  cholecalciferol 1000 Unit(s) Oral daily  CRRT Treatment    <Continuous>  dexMEDEtomidine Infusion 0.5 MICROgram(s)/kG/Hr IV Continuous <Continuous>  ganciclovir IVPB 230 milliGRAM(s) IV Intermittent every 24 hours  heparin  Infusion 1000 Unit(s)/Hr IV Continuous <Continuous>  insulin lispro (ADMELOG) corrective regimen sliding scale   SubCutaneous every 6 hours  meropenem  IVPB 1000 milliGRAM(s) IV Intermittent every 8 hours  metoprolol tartrate 25 milliGRAM(s) Oral two times a day  nystatin    Suspension 290827 Unit(s) Oral four times a day  OLANZapine Injectable 5 milliGRAM(s) IntraMuscular every 12 hours PRN  pantoprazole  Injectable 40 milliGRAM(s) IV Push two times a day  Phoxillum Filtration BK 4 / 2.5 5000 milliLiter(s) CRRT <Continuous>  Phoxillum Filtration BK 4 / 2.5 5000 milliLiter(s) CRRT <Continuous>  Phoxillum Filtration BK 4 / 2.5 5000 milliLiter(s) CRRT <Continuous>  polyethylene glycol 3350 17 Gram(s) Oral daily  predniSONE   Tablet 15 milliGRAM(s) Oral daily  senna 2 Tablet(s) Oral daily  tacrolimus    0.5 mG/mL Suspension 3.5 milliGRAM(s) Oral <User Schedule>  vancomycin  IVPB 750 milliGRAM(s) IV Intermittent every 12 hours      Vitals:  T(F): 98 (03-03), Max: 99.2 (03-02)  HR: 83 (03-03) (83 - 130)  BP: 154/65 (03-03) (108/49 - 160/70)  RR: 14 (03-03)  SpO2: 98% (03-03)  I&O's Summary    02 Mar 2025 07:01  -  03 Mar 2025 07:00  --------------------------------------------------------  IN: 2184.6 mL / OUT: 2111 mL / NET: 73.6 mL    03 Mar 2025 07:01  -  03 Mar 2025 13:14  --------------------------------------------------------  IN: 505 mL / OUT: 512 mL / NET: -7 mL        Physical Exam:  Appearance: No acute distress  Eyes: PERRL, EOMI, pink conjunctiva  HEENT: Normal oral mucosa  Cardiovascular: tachycardic   Respiratory: Clear to auscultation bilaterally  Gastrointestinal: soft, non-tender, non-distended with normal bowel sounds  Musculoskeletal: No clubbing; no joint deformity   Neurologic: Non-focal  Lymphatic: No lymphadenopathy  Psychiatry: AAOx3, mood & affect appropriate  Skin: No rashes, ecchymoses, or cyanosis                          7.5    19.97 )-----------( 231      ( 03 Mar 2025 01:12 )             23.9     03-03    135  |  102  |  15  ----------------------------<  148[H]  3.9   |  24  |  1.40[H]    Ca    7.4[L]      03 Mar 2025 01:12  Phos  2.3     03-03  Mg     2.4     03-03    TPro  4.8[L]  /  Alb  2.4[L]  /  TBili  1.0  /  DBili  x   /  AST  23  /  ALT  13  /  AlkPhos  49  03-03    PT/INR - ( 03 Mar 2025 06:38 )   PT: 12.1 sec;   INR: 1.06 ratio         PTT - ( 03 Mar 2025 06:38 )  PTT:61.1 sec

## 2025-03-03 NOTE — PROGRESS NOTE ADULT - PROBLEM SELECTOR PLAN 6
- Adjust prograf for goal 3-5; Lvl 3/1 2.5, continue current dosing 2.5 mg bid for now  - hydrocort -+ coffee ground emesis   - CTAP (2/22) ileus vs partial SBO - cannot r/o GI bleed  - NGT placed set to LIS -> minimal output x several days  - On PPI  - no recurrent emesis or significant NG tube output x several days

## 2025-03-03 NOTE — PROGRESS NOTE ADULT - PROBLEM SELECTOR PLAN 2
- was readmitted with worsening mental status, currently A&Ox1  - EEG unremarkable  - CT Angio head 2/19 was unremarkable how limited diagnostic study secondary to suboptimal opacification of the   intracranial arterial vasculature  - CT A/P unremarkable  - MRI chest: Metallic artifact, no evidence OM. Edema right pectroalis with surrounding soft tissue edema = cellulitis vs. myositis. No abscess seen  - Consider LP - Presented with T 91.4F, WBC 21, pseudomonas bacteremia  - Bcx 2/20 w/ Pseudomonas koreensis, Ucx NGTD, repeat cultures 2/22 NGTD  - CT C/A/P: Diffusely dilated small bowel loops with air-fluid levels: ileus or less likely partial small bowel obstruction rather than mechanical obstruction. No manifest signs of bowel ischemia. Interval worsening of right lower lobe and right middle lobe atelectasis secondary to elevated right hemidiaphragm when compared to prior CT dated 7/11/2023. Superimposed infection in the collapsed lungs is not excluded  - s/p Zosyn, switched to meropenem for CNS coverage, and continue vancomycin  - Repeating serum CMV PCR (last on 2/22 negative), Toxoplasma Serum IgM neg, HSV 1/2 not detected, WNV Serology and PCR   - Appreciate Transplant ID recommendations

## 2025-03-03 NOTE — PROGRESS NOTE ADULT - PROBLEM SELECTOR PLAN 3
- noted to be hyperkalemic on labs upon admission  - Nephrology following  - remains on Lokelma TID (of note was on veltassa at home)   - trend twice daily. - was readmitted with worsening mental status, currently A&Ox1  - EEG unremarkable  - CT Angio head 2/19 was unremarkable how limited diagnostic study secondary to suboptimal opacification of the   intracranial arterial vasculature  - CT A/P unremarkable  - MRI chest: Metallic artifact, no evidence OM. Edema right pectroalis with surrounding soft tissue edema = cellulitis vs. myositis. No abscess seen  - consider LP to r/o meningitis if AMS not improving despite improvement of metabolic factors

## 2025-03-03 NOTE — PROGRESS NOTE ADULT - ASSESSMENT
====================ASSESSMENT ==============  73yo M w/ pmhx HTN, HLD, nonischemic cardiomyopathy, chronic systolic heart failure s/p HM2 LVAD (6/2017), s/p heart transplant from Hep. 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 Found to have septic shock, (2/19 BCx pseudo, 2/22 BCx NGTD). Still episodes of hypotension. Worsening NORMAN. Accepted to MICU for CRRT.    Plan:  ====================== NEUROLOGY=====================  # Metabolic encephalopathy.  - Likely multi-factorial etiologies: septic vs uremic vs hypercapnic, less likely meningitis  - Patient baseline AOx3. AOx1 at ED arrival, iso of infection vs metabolic derangements (uremic encephelopathy)  - CTH and angio w/o hemorrhage or stenosis   - TSH wnl / B12, CK 2200  - Fall precautions  - spot EEG 2/21: negative for seizures   - Currently A&O xO  - Cont. Low dose precedex gtt     Plan  - Monitor MS w/ abx and CRRT  - Neurology consulted  - c/w precedex gtt 1.0    ==================== RESPIRATORY======================  #Metabolic acidosis  - respiratory and metabolic acidosis VpH 7.17, PCO2 57 on admission  - pH improving, likely as respiratory component has improved  - Remains on room air    ====================CARDIOVASCULAR==================  #Septic shock from pseudomonas bacteremia  - POCUS reveals no evidence of  fluid overload  - Pt is also likely intravascular depleted with ileus and poor po intake.   - S/p IVF  - Patient volume up hold off IVF, now running net even on CRRT  - s/p levo, off 2/24    Plan  - F/u transplant ID recs - CMV ordered    # H/O heart transplant.   - Nonischemic cardiomyopathy, chronic systolic heart failure s/p HM2 LVAD (6/2017), s/p heart transplant from Hep. C donor (treated) 2/23/18   - Home Tacrolimus dose 2mg BID, Pred 15mg qd  - Intolerant of Cellcept due to leukopenia.  - Continue with home Pred 15mg qd  - Hold home Bactrim given hyperkalemia, now on Atovaquone for ppx  - ASA  - c/w home Atorvastatin (therapeutic interchange)   - HOLD home Metoprolol from 200mg with hold parameters due to hypotension (02/23)  - Tacro level daily : home regimen 2mg BID, c/w 0.8mg BID-> will adjust by level today     # Paroxysmal atrial fibrillation.   - Home regimen : Eliquis 5 mg PO BID at home for hx of of afib and IJ thrombi  - EKG on admission - nsr   - [id number]  = 3  - Last Dose : 2/20 AM   - Eliquis held early in week for possible LP and episode coffee ground emesis -> no current plan for LP, no recurrent emesis or significant NG tube output x several days  -  heparin gtt for AC    # Hypertension (chronic)  - Hold home Toprol  mg PO QD ISO hypotension  - Hold home Losartan 75 mg PO QD due to NORMAN & hypotension.    # Vascular disorder of lower extremity.   - Extremities appear cool and dry   - c/w Local wound care by podiatry   - Podiatry recommendations - pending SHANEKA studies, consider vascular evaluation if abnormal.    ===================HEMATOLOGIC/ONC ===================  # Hx of hemolytic anemia, Sanchez Syndrome  - hx of hemolytic anemia, follows with Dr. Rosario as outpatient  - hold Prednisone 15 mg PO QD --> switched to Solu-cortef 75mg IV q8hr   - f/u hematology/oncology recs  - Monitor H&H daily.  - f/u hemolysis labs and peripheral smear    # DVT ppx  - therapeutic heparin gtt for Afib    ===================== RENAL =========================  NORMAN on CKD  ATN 2/2 septic shock  - Known hx of CKD, Cr now uptrending  - Scr ranged from 1.3-2.1 in 2023. Most recent Scr was 1.75 on 1/30/24. On admission, Scr was 1.9 and is worsening now. UA showed trace ketones.   - No improvement with IVF, likely ATN i/s/o contrast/medications.   - S/p double lumen L fem catheter  - CRRT net even  - c/w renvela for hyperphosphatemia  - CRRT as per nephro    #Hyperkalemia (resolved)   - K 6.2 at ED arrival  - shifted in the ED, no EKG changes consistent with severe hyper K. Cr not significantly elevated form baseline, c/f RTA from bactrim and Tacro. Stable   - s/p Lokelma 10mg BID for 3 days  - f/u Tacro level daily   - Hold home bactrim - ID consult regarding Atovaquone   - c/w monitoring patient on Tele.  - CRRT as above    ==================== GASTROINTESTINAL===================  #Ileus  #+/- coffee ground emesis  - Ileus could potentially be source of infx  - 1 episode of coffee ground emesis, unclear if true GIB  - Hb stable   - CTAP (2/22) ileus vs partial SBO - cannot r/o GI bleed  - NGT placed set to LIS -> minimal output x several days  - monitor for further episodes  - c/w PPI IV 40 BID   - c/w trending CBC Q12H  - bowel regimen: senna, miralax  - Restart DVT Ppx/ASA and heparin gtt for full AC for now; if no more GIB and HgB stable  - if repeat episode, GI consult   - If repeat episode or worsening lactate, CT angiogram with venous phase.  - 2/28 - trial trickle tube feeds    =======================    ENDOCRINE  =====================  # Diabetes type 2.   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 No manifest signs of bowel ischemia. Interval worsening of right lower lobe and right middle lobe atelectasis secondary to elevated right hemidiaphragm when compared to prior CT dated 7/11/2023. Superimposed infection in the collapsed lungs is not excluded  - s/p meropenem (2/21-2/24)  - Hold off additional doses of Vancomycin (MRSA swab negative)  - c/w home Valganciclovir for ppx renally dosed   - f/u infectious labs - Toxoplasma, EBV, Fungitell, Galactomannan, CMV, Cryptococcus, MRSA, ASCENCION virus, CMV, Crypto, ASCENCION virus  - f/u GI PCR, consider C. Diff testing   - Transplant ID recommendations   - Consider LP if MS not improving  - Warm blanket    ======================= DISPOSITION  =====================  [X] Critical   [ ] Guarded    [ ] Stable    [X] Maintain in CICU  [ ] Downgrade to Telemetry  [ ] Discharge Home    Patient requires continuous monitoring with bedside rhythm monitoring, pulse ox monitoring, and intermittent blood gas analysis. Care plan discussed with ICU care team. Patient remained critical and at risk for life threatening decompensation.  Patient seen, examined and plan discussed with CCU team during rounds.      ====================ASSESSMENT ==============  73yo M w/ pmhx HTN, HLD, nonischemic cardiomyopathy, chronic systolic heart failure s/p HM2 LVAD (6/2017), s/p heart transplant from Hep. 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 Found to have septic shock, (2/19 BCx pseudo, 2/22 BCx NGTD). Still episodes of hypotension. Worsening NORMAN. Accepted to MICU for CRRT.    Plan:  ====================== NEUROLOGY=====================  # Metabolic encephalopathy.  - Likely multi-factorial etiologies: septic vs uremic vs hypercapnic, less likely meningitis  - Patient baseline AOx3. AOx1 at ED arrival, iso of infection vs metabolic derangements (uremic encephelopathy)  - CTH and angio w/o hemorrhage or stenosis   - TSH wnl / B12, CK 2200  - Fall precautions  - spot EEG 2/21: negative for seizures   - Currently A&O x1  - precedex gtt     Plan  - Monitor MS w/ abx and CRRT  - Neurology consulted  - c/w precedex gtt 1.0  - add zyprexa 5mg PRN  - d/c stress dose steroids --> resume home prednisone    ==================== RESPIRATORY======================  #Metabolic acidosis  - respiratory and metabolic acidosis VpH 7.17, PCO2 57 on admission  - pH improving, likely as respiratory component has improved  - Remains on room air    ====================CARDIOVASCULAR==================  #Septic shock from pseudomonas bacteremia  - POCUS reveals no evidence of  fluid overload  - Pt is also likely intravascular depleted with ileus and poor po intake.   - S/p IVF  - Patient volume up hold off IVF, now running net even on CRRT  - s/p levo, off 2/24    Plan  - F/u transplant ID recs - CMV ordered    # H/O heart transplant.   - Nonischemic cardiomyopathy, chronic systolic heart failure s/p HM2 LVAD (6/2017), s/p heart transplant from Hep. C donor (treated) 2/23/18   - Home Tacrolimus dose 2mg BID, Pred 15mg qd  - Intolerant of Cellcept due to leukopenia.  - s/p stress dose steroids solucortef 75mg q8 (2/26-3/3)  - restart home Pred 15mg qd (3/3 - )  - Hold home Bactrim given hyperkalemia, now on Atovaquone for ppx  - ASA  - c/w home Atorvastatin (therapeutic interchange)   - HOLD home Metoprolol from 200mg  - c/w metoprolol 25mg BID  - Tacro level daily : home regimen 2mg BID --> will adjust by level    # Paroxysmal atrial fibrillation.   - Home regimen : Eliquis 5 mg PO BID at home for hx of of afib and IJ thrombi  - EKG on admission - nsr   - [id number]  = 3  - Last Dose : 2/20 AM   - Eliquis held early in week for possible LP and episode coffee ground emesis -> no current plan for LP, no recurrent emesis or significant NG tube output x several days  - heparin gtt for AC    # Hypertension (chronic)  - Hold home Toprol  mg PO QD ISO hypotension  - Hold home Losartan 75 mg PO QD due to NORMAN & hypotension.    # Vascular disorder of lower extremity.   - Extremities appear cool and dry   - c/w Local wound care by podiatry   - Podiatry recommendations - pending SHANEKA studies, consider vascular evaluation if abnormal.    ===================HEMATOLOGIC/ONC ===================  # Hx of hemolytic anemia, Sanchez Syndrome  - hx of hemolytic anemia, follows with Dr. Rosario as outpatient  - resume home Prednisone 15 mg PO QD  - s/p Solu-cortef 75mg IV q8hr (2/26-3/3)  - f/u hematology/oncology recs  - Monitor H&H daily.  - Monitor hemolysis labs and peripheral smear    # DVT ppx  - therapeutic heparin gtt for Afib    ===================== RENAL =========================  NORMAN on CKD  ATN 2/2 septic shock  - Known hx of CKD, Cr now uptrending  - Scr ranged from 1.3-2.1 in 2023. Most recent Scr was 1.75 on 1/30/24. On admission, Scr was 1.9 and is worsening now. UA showed trace ketones.   - No improvement with IVF, likely ATN i/s/o contrast/medications.   - S/p double lumen L fem catheter  - CRRT net even  - s/p renvela for hyperphosphatemia  - CRRT as per nephro    #Hyperkalemia (resolved)   - K 6.2 at ED arrival  - shifted in the ED, no EKG changes consistent with severe hyper K. Cr not significantly elevated form baseline, c/f RTA from bactrim and Tacro. Stable   - s/p Lokelma 10mg BID for 3 days  - f/u Tacro level daily   - Hold home bactrim - ID consult regarding Atovaquone   - c/w monitoring patient on Tele.  - CRRT as above    ==================== GASTROINTESTINAL===================  #Ileus  #+/- coffee ground emesis  - Ileus could potentially be source of infx  - 1 episode of coffee ground emesis, unclear if true GIB  - Hb stable   - CTAP (2/22) ileus vs partial SBO - cannot r/o GI bleed  - NGT placed set to LIS -> minimal output x several days  - monitor for further episodes  - c/w PPI IV 40 BID   - c/w trending CBC Q12H  - bowel regimen: senna, miralax  - Restart DVT Ppx/ASA and heparin gtt for full AC for now; if no more GIB and HgB stable  - if repeat episode, GI consult   - If repeat episode or worsening lactate, CT angiogram with venous phase.  - 2/28 - trickle tube feeds restarted    Plan  - c/w NG tube feeds  - bowel regimine  - c/w PPI 40mg IV BID    =======================    ENDOCRINE  =====================  # Diabetes type 2.   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 No manifest signs of bowel ischemia. Interval worsening of right lower lobe and right middle lobe atelectasis secondary to elevated right hemidiaphragm when compared to prior CT dated 7/11/2023. Superimposed infection in the collapsed lungs is not excluded  - meropenem (2/21-2/24), restarted meropenem for CNS coverage (2/28 - )  - vancomycin for CNS coverage (2/28 - )  - s/p zosyn (2/24 - 2/28)  - MRSA swab negative  - hold home Valganciclovir for ppx --> switch to ganciclovir ppx  - f/u infectious labs - Toxoplasma, EBV, Fungitell, Galactomannan, CMV, Cryptococcus, MRSA, ASCENCION virus, CMV, Crypto, ASCENCION virus  - f/u GI PCR, consider C. Diff testing   - Transplant ID recommendations   - Consider LP if MS not improving  - Warm blanket    ======================= DISPOSITION  =====================  [X] Critical   [ ] Guarded    [ ] Stable    [X] Maintain in CICU  [ ] Downgrade to Telemetry  [ ] Discharge Home    Patient requires continuous monitoring with bedside rhythm monitoring, pulse ox monitoring, and intermittent blood gas analysis. Care plan discussed with ICU care team. Patient remained critical and at risk for life threatening decompensation.  Patient seen, examined and plan discussed with CCU team during rounds.

## 2025-03-03 NOTE — PROGRESS NOTE ADULT - ASSESSMENT
73yo M w/ pmhx HTN, HLD, nonischemic cardiomyopathy, chronic systolic heart failure s/p HM2 LVAD (6/2017), s/p heart transplant from Hep. 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 Found to have septic shock, (2/19 BCx pseudo, 2/22 BCx NGTD). Course c/b anuric NORMAN requiring CVVH. Also persistent AMS. Unclear bacterial source.    TTE 2/25/25: E 54%, mildly reduced RV function

## 2025-03-03 NOTE — PROGRESS NOTE ADULT - SUBJECTIVE AND OBJECTIVE BOX
PATIENT: BILLY RUSSELL, MRN: [id number]    CHIEF COMPLAINT: Patient is a 74y old  Male who presents with a chief complaint of Lethargy (02 Mar 2025 19:40)      INTERVAL HISTORY/OVERNIGHT EVENTS: No overnight events. Denies abdominal pain, chest pain or SOB, cough. Has been ambulating with assistance. Oriented to person, place, and time. Breathing comfortably on room air.    REVIEW OF SYSTEMS:    Constitutional:     [ ] negative [ ] fevers [ ] chills [ ] weight loss [ ] weight gain  HEENT:                  [ ] negative [ ] dry eyes [ ] eye irritation [ ] postnasal drip [ ] nasal congestion  CV:                         [ ] negative  [ ] chest pain [ ] orthopnea [ ] palpitations [ ] murmur  Resp:                     [ ] negative [ ] cough [ ] shortness of breath [ ] dyspnea [ ] wheezing [ ] sputum [ ] hemoptysis  GI:                          [ ] negative [ ] nausea [ ] vomiting [ ] diarrhea [ ] constipation [ ] abd pain [ ] dysphagia   :                        [ ] negative [ ] dysuria [ ] nocturia [ ] hematuria [ ] increased urinary frequency  Musculoskeletal: [ ] negative [ ] back pain [ ] myalgias [ ] arthralgias [ ] fracture  Skin:                       [ ] negative [ ] rash [ ] itch  Neurological:        [ ] negative [ ] headache [ ] dizziness [ ] syncope [ ] weakness [ ] numbness  Psychiatric:           [ ] negative [ ] anxiety [ ] depression  Endocrine:            [ ] negative [ ] diabetes [ ] thyroid problem  Heme/Lymph:      [ ] negative [ ] anemia [ ] bleeding problem  Allergic/Immune: [ ] negative [ ] itchy eyes [ ] nasal discharge [ ] hives [ ] angioedema    [ ] All other systems negative  [ ] Unable to assess ROS because ________.    MEDICATIONS:  MEDICATIONS  (STANDING):  aspirin  chewable 81 milliGRAM(s) Oral daily  atorvastatin 10 milliGRAM(s) Oral at bedtime  atovaquone  Suspension 1500 milliGRAM(s) Oral daily  chlorhexidine 2% Cloths 1 Application(s) Topical daily  cholecalciferol 1000 Unit(s) Oral daily  CRRT Treatment    <Continuous>  dexMEDEtomidine Infusion 0.5 MICROgram(s)/kG/Hr (11.3 mL/Hr) IV Continuous <Continuous>  ganciclovir IVPB 230 milliGRAM(s) IV Intermittent every 24 hours  heparin  Infusion 1000 Unit(s)/Hr (8 mL/Hr) IV Continuous <Continuous>  hydrocortisone sodium succinate Injectable 75 milliGRAM(s) IV Push every 8 hours  insulin lispro (ADMELOG) corrective regimen sliding scale   SubCutaneous every 6 hours  meropenem  IVPB 1000 milliGRAM(s) IV Intermittent every 8 hours  metoprolol tartrate 25 milliGRAM(s) Oral two times a day  nystatin    Suspension 472519 Unit(s) Oral four times a day  pantoprazole  Injectable 40 milliGRAM(s) IV Push two times a day  Phoxillum Filtration BK 4 / 2.5 5000 milliLiter(s) (1000 mL/Hr) CRRT <Continuous>  Phoxillum Filtration BK 4 / 2.5 5000 milliLiter(s) (200 mL/Hr) CRRT <Continuous>  Phoxillum Filtration BK 4 / 2.5 5000 milliLiter(s) (1200 mL/Hr) CRRT <Continuous>  polyethylene glycol 3350 17 Gram(s) Oral daily  senna 2 Tablet(s) Oral daily  tacrolimus    0.5 mG/mL Suspension 2.5 milliGRAM(s) Oral <User Schedule>  vancomycin  IVPB 750 milliGRAM(s) IV Intermittent every 12 hours    MEDICATIONS  (PRN):      ALLERGIES: Allergies    No Known Allergies    Intolerances        OBJECTIVE:  ICU Vital Signs Last 24 Hrs  T(C): 36.7 (03 Mar 2025 08:00), Max: 37.3 (02 Mar 2025 15:00)  T(F): 98 (03 Mar 2025 08:00), Max: 99.2 (02 Mar 2025 19:00)  HR: 88 (03 Mar 2025 09:00) (84 - 130)  BP: 135/59 (03 Mar 2025 09:00) (108/49 - 160/70)  BP(mean): 85 (03 Mar 2025 09:00) (69 - 126)  ABP: --  ABP(mean): --  RR: 16 (03 Mar 2025 09:00) (11 - 31)  SpO2: 97% (03 Mar 2025 09:00) (94% - 100%)    O2 Parameters below as of 03 Mar 2025 09:00  Patient On (Oxygen Delivery Method): room air            Adult Advanced Hemodynamics Last 24 Hrs  CVP(mm Hg): --  CVP(cm H2O): --  CO: --  CI: --  PA: --  PA(mean): --  PCWP: --  SVR: --  SVRI: --  PVR: --  PVRI: --  CAPILLARY BLOOD GLUCOSE      POCT Blood Glucose.: 147 mg/dL (02 Mar 2025 23:54)  POCT Blood Glucose.: 142 mg/dL (02 Mar 2025 18:09)  POCT Blood Glucose.: 147 mg/dL (02 Mar 2025 11:34)    CAPILLARY BLOOD GLUCOSE      POCT Blood Glucose.: 147 mg/dL (02 Mar 2025 23:54)    I&O's Summary    02 Mar 2025 07:01  -  03 Mar 2025 07:00  --------------------------------------------------------  IN: 2184.6 mL / OUT: 2111 mL / NET: 73.6 mL    03 Mar 2025 07:01  -  03 Mar 2025 09:22  --------------------------------------------------------  IN: 164 mL / OUT: 240 mL / NET: -76 mL      Daily     Daily Weight in k.1 (03 Mar 2025 06:00)    PHYSICAL EXAMINATION:  General: Comfortable, no acute distress, cooperative with exam.  HEENT: Moist mucous membranes.  Respiratory: CTAB, normal respiratory effort, no coughing, wheezes, crackles, or rales.  CV: RRR, S1S2, no murmurs, rubs or gallops. No JVD. Distal pulses intact.  Abdominal: Soft, nontender, nondistended, no rebound or guarding, normal bowel sounds.  Neurology: AOx3, no focal neuro defects, RUVALCABA x 4.  Extremities: No pitting edema, + Peripheral pulses.  Cath site:   Tubes:    LABS:                          7.5    19.97 )-----------( 231      ( 03 Mar 2025 01:12 )             23.9         135  |  102  |  15  ----------------------------<  148[H]  3.9   |  24  |  1.40[H]    Ca    7.4[L]      03 Mar 2025 01:12  Phos  2.3     -  Mg     2.4     -    TPro  4.8[L]  /  Alb  2.4[L]  /  TBili  1.0  /  DBili  x   /  AST  23  /  ALT  13  /  AlkPhos  49  03-03    LIVER FUNCTIONS - ( 03 Mar 2025 01:12 )  Alb: 2.4 g/dL / Pro: 4.8 g/dL / ALK PHOS: 49 U/L / ALT: 13 U/L / AST: 23 U/L / GGT: x           PT/INR - ( 03 Mar 2025 06:38 )   PT: 12.1 sec;   INR: 1.06 ratio         PTT - ( 03 Mar 2025 06:38 )  PTT:61.1 sec        Urinalysis Basic - ( 03 Mar 2025 01:12 )    Color: x / Appearance: x / SG: x / pH: x  Gluc: 148 mg/dL / Ketone: x  / Bili: x / Urobili: x   Blood: x / Protein: x / Nitrite: x   Leuk Esterase: x / RBC: x / WBC x   Sq Epi: x / Non Sq Epi: x / Bacteria: x        TELEMETRY:     EKG:     IMAGING:    PATIENT: BILLY RUSSELL, MRN: [id number]    CHIEF COMPLAINT: Patient is a 74y old  Male who presents with a chief complaint of Lethargy (02 Mar 2025 19:40)      INTERVAL HISTORY/OVERNIGHT EVENTS: No overnight events. Pt continues to be agitated.  Denies abdominal pain, chest pain or SOB, cough. Breathing comfortably on room air.    REVIEW OF SYSTEMS:    Constitutional:     [ ] negative [ ] fevers [ ] chills [ ] weight loss [ ] weight gain  HEENT:                  [ ] negative [ ] dry eyes [ ] eye irritation [ ] postnasal drip [ ] nasal congestion  CV:                         [ ] negative  [ ] chest pain [ ] orthopnea [ ] palpitations [ ] murmur  Resp:                     [ ] negative [ ] cough [ ] shortness of breath [ ] dyspnea [ ] wheezing [ ] sputum [ ] hemoptysis  GI:                          [ ] negative [ ] nausea [ ] vomiting [ ] diarrhea [ ] constipation [ ] abd pain [ ] dysphagia   :                        [ ] negative [ ] dysuria [ ] nocturia [ ] hematuria [ ] increased urinary frequency  Musculoskeletal: [ ] negative [ ] back pain [ ] myalgias [ ] arthralgias [ ] fracture  Skin:                       [ ] negative [ ] rash [ ] itch  Neurological:        [ ] negative [ ] headache [ ] dizziness [ ] syncope [ ] weakness [ ] numbness  Psychiatric:           [ ] negative [ ] anxiety [ ] depression  Endocrine:            [ ] negative [ ] diabetes [ ] thyroid problem  Heme/Lymph:      [ ] negative [ ] anemia [ ] bleeding problem  Allergic/Immune: [ ] negative [ ] itchy eyes [ ] nasal discharge [ ] hives [ ] angioedema    [ ] All other systems negative  [X ] Unable to assess ROS because ___AMS____.    MEDICATIONS:  MEDICATIONS  (STANDING):  aspirin  chewable 81 milliGRAM(s) Oral daily  atorvastatin 10 milliGRAM(s) Oral at bedtime  atovaquone  Suspension 1500 milliGRAM(s) Oral daily  chlorhexidine 2% Cloths 1 Application(s) Topical daily  cholecalciferol 1000 Unit(s) Oral daily  CRRT Treatment    <Continuous>  dexMEDEtomidine Infusion 0.5 MICROgram(s)/kG/Hr (11.3 mL/Hr) IV Continuous <Continuous>  ganciclovir IVPB 230 milliGRAM(s) IV Intermittent every 24 hours  heparin  Infusion 1000 Unit(s)/Hr (8 mL/Hr) IV Continuous <Continuous>  hydrocortisone sodium succinate Injectable 75 milliGRAM(s) IV Push every 8 hours  insulin lispro (ADMELOG) corrective regimen sliding scale   SubCutaneous every 6 hours  meropenem  IVPB 1000 milliGRAM(s) IV Intermittent every 8 hours  metoprolol tartrate 25 milliGRAM(s) Oral two times a day  nystatin    Suspension 334769 Unit(s) Oral four times a day  pantoprazole  Injectable 40 milliGRAM(s) IV Push two times a day  Phoxillum Filtration BK 4 / 2.5 5000 milliLiter(s) (1000 mL/Hr) CRRT <Continuous>  Phoxillum Filtration BK 4 / 2.5 5000 milliLiter(s) (200 mL/Hr) CRRT <Continuous>  Phoxillum Filtration BK 4 / 2.5 5000 milliLiter(s) (1200 mL/Hr) CRRT <Continuous>  polyethylene glycol 3350 17 Gram(s) Oral daily  senna 2 Tablet(s) Oral daily  tacrolimus    0.5 mG/mL Suspension 2.5 milliGRAM(s) Oral <User Schedule>  vancomycin  IVPB 750 milliGRAM(s) IV Intermittent every 12 hours    MEDICATIONS  (PRN):      ALLERGIES: Allergies    No Known Allergies    Intolerances        OBJECTIVE:  ICU Vital Signs Last 24 Hrs  T(C): 36.7 (03 Mar 2025 08:00), Max: 37.3 (02 Mar 2025 15:00)  T(F): 98 (03 Mar 2025 08:00), Max: 99.2 (02 Mar 2025 19:00)  HR: 88 (03 Mar 2025 09:00) (84 - 130)  BP: 135/59 (03 Mar 2025 09:00) (108/49 - 160/70)  BP(mean): 85 (03 Mar 2025 09:00) (69 - 126)  ABP: --  ABP(mean): --  RR: 16 (03 Mar 2025 09:00) (11 - 31)  SpO2: 97% (03 Mar 2025 09:00) (94% - 100%)    O2 Parameters below as of 03 Mar 2025 09:00  Patient On (Oxygen Delivery Method): room air            Adult Advanced Hemodynamics Last 24 Hrs  CVP(mm Hg): --  CVP(cm H2O): --  CO: --  CI: --  PA: --  PA(mean): --  PCWP: --  SVR: --  SVRI: --  PVR: --  PVRI: --  CAPILLARY BLOOD GLUCOSE      POCT Blood Glucose.: 147 mg/dL (02 Mar 2025 23:54)  POCT Blood Glucose.: 142 mg/dL (02 Mar 2025 18:09)  POCT Blood Glucose.: 147 mg/dL (02 Mar 2025 11:34)    CAPILLARY BLOOD GLUCOSE      POCT Blood Glucose.: 147 mg/dL (02 Mar 2025 23:54)    I&O's Summary    02 Mar 2025 07:01  -  03 Mar 2025 07:00  --------------------------------------------------------  IN: 2184.6 mL / OUT: 2111 mL / NET: 73.6 mL    03 Mar 2025 07:01  -  03 Mar 2025 09:22  --------------------------------------------------------  IN: 164 mL / OUT: 240 mL / NET: -76 mL      Daily     Daily Weight in k.1 (03 Mar 2025 06:00)    PHYSICAL EXAMINATION:  Constitutional: laying in bed, arousable, agitated, frequently moving arms and head, following few commands, speech not clear  HEENT: NC/AT, PERRLA, EOMI, no conjunctival pallor or scleral icterus, MMM  Neck: Supple, no JVD  Respiratory: CTA B/L. No w/r/r.   Cardiovascular: regular rate, normal S1 and S2, no m/r/g.   Gastrointestinal: mildly distended, +BS, nontender, no guarding or rebound tenderness, no palpable masses  Extremities: RUE edematous; no cyanosis, clubbing.  Neurological: AAOx1 (self), no CN deficits, strength and sensation intact throughout.   Skin: approx. 3x3cm area of hyperpigmentation and swelling to R parasternal upper chest wall, no fluctuance no open wounds.    LABS:                          7.5    19.97 )-----------( 231      ( 03 Mar 2025 01:12 )             23.9     03-03    135  |  102  |  15  ----------------------------<  148[H]  3.9   |  24  |  1.40[H]    Ca    7.4[L]      03 Mar 2025 01:12  Phos  2.3     03-03  Mg     2.4     -03    TPro  4.8[L]  /  Alb  2.4[L]  /  TBili  1.0  /  DBili  x   /  AST  23  /  ALT  13  /  AlkPhos  49  03-03    LIVER FUNCTIONS - ( 03 Mar 2025 01:12 )  Alb: 2.4 g/dL / Pro: 4.8 g/dL / ALK PHOS: 49 U/L / ALT: 13 U/L / AST: 23 U/L / GGT: x           PT/INR - ( 03 Mar 2025 06:38 )   PT: 12.1 sec;   INR: 1.06 ratio         PTT - ( 03 Mar 2025 06:38 )  PTT:61.1 sec        Urinalysis Basic - ( 03 Mar 2025 01:12 )    Color: x / Appearance: x / SG: x / pH: x  Gluc: 148 mg/dL / Ketone: x  / Bili: x / Urobili: x   Blood: x / Protein: x / Nitrite: x   Leuk Esterase: x / RBC: x / WBC x   Sq Epi: x / Non Sq Epi: x / Bacteria: x        TELEMETRY:     EKG:     IMAGING:

## 2025-03-03 NOTE — PROGRESS NOTE ADULT - ATTENDING COMMENTS
seen on cvvhdf  tolerating well   keep even   remains anuric    jyoti abraham  nephrology attending   please contact me on TEAMS   Office- [phone]

## 2025-03-03 NOTE — PROGRESS NOTE ADULT - ATTENDING COMMENTS
Admitted with cardiogenic / mixed shock  History of heart transplant in 2018  Admitted with encephalopathy of unknown origin  A+Ox1, delirious, on Precedex - start Zyprexa and wean Precedex  Hemodynamics acceptable, no pressors or inotropes  Stop stress dose steroids and resume outpatient Prednisone along with outpatient Tacro  TTE with preserved LVEF  Tolerating tube feeds  Oliguric NORMAN, CVP is low, on CVVH - discuss transition to HD vs. trial off CRRT  H/H low but acceptable on Heparin drip for afib   Afebrile, on empiric Meropenem and Vancomycin, cultures negative  Sugars controlled  [facility] TLC 2/25 and femoral Shiley 2/24 - change out Shiley

## 2025-03-03 NOTE — PROCEDURE NOTE - NSPOSTPRCRAD_GEN_A_CORE
post-procedure radiography performed central line located in the superior vena cava/depth of insertion/no pneumothorax/post-procedure radiography performed

## 2025-03-03 NOTE — PROGRESS NOTE ADULT - ASSESSMENT
73M w/ HTN, HLD, nonischemic cardiomyopathy, chronic systolic heart failure s/p HM2 LVAD (6/2017), s/p heart transplant from Hep. 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 Admitted with septic shock w/ pseudomonus bacteremia.    NORMAN on CKD3 iso septic shock w/ pseudomonus bacteremi, now on CRRT.  [facility] Scr ranged from 1.3-2.1 in 2023. Most recent Scr was 1.75 on 1/30/24. On admission Scr 1.92 (2/19), worsened to 6.89 (2/23). Initiated on CRRT (2/23) iso worsening oliguric renal failure and hemodynamic instability. Pt seen on CRRT this morning, not on IV pressors, tolerating treatment and no issues with L-femoral NTDC or CRRT cuircuit as per RN. Labs reviewed. Plan to continue CRRT today, bags adjusted as per latest labs. Dose meds for CrCl ~40ml/min while on CRRT. Monitor labs, daily wts and I/Os. Immunosuppression as per primary team.     If you have any questions, please feel free to contact me  Boston Caballero  Nephrology Fellow  T3 Search/Page [phone]  (After 5pm or on weekends please page the on-call fellow)

## 2025-03-03 NOTE — PROGRESS NOTE ADULT - NUTRITIONAL ASSESSMENT
Diet, NPO with Tube Feed:   Tube Feeding Modality: Nasogastric  Nepro with Carb Steady (NEPRORTH)  Total Volume for 24 Hours (mL): 240  Continuous  Starting Tube Feed Rate {mL per Hour}: 10  Until Goal Tube Feed Rate (mL per Hour): 10  Tube Feed Duration (in Hours): 24  Tube Feed Start Time: 10:00 (02-28-25 @ 09:54) [Active]
Diet, NPO with Tube Feed:   Tube Feeding Modality: Nasogastric  Nepro with Carb Steady (NEPRORTH)  Total Volume for 24 Hours (mL): 240  Continuous  Starting Tube Feed Rate {mL per Hour}: 10  Until Goal Tube Feed Rate (mL per Hour): 10  Tube Feed Duration (in Hours): 24  Tube Feed Start Time: 10:00  Banatrol TF     Qty per Day:  2 (03-02-25 @ 10:25) [Active]
Diet, NPO with Tube Feed:   Tube Feeding Modality: Nasogastric  Nepro with Carb Steady (NEPRORTH)  Total Volume for 24 Hours (mL): 240  Continuous  Starting Tube Feed Rate {mL per Hour}: 10  Until Goal Tube Feed Rate (mL per Hour): 10  Tube Feed Duration (in Hours): 24  Tube Feed Start Time: 10:00  Banatrol TF     Qty per Day:  2 (03-02-25 @ 10:25) [Active]

## 2025-03-03 NOTE — PROGRESS NOTE ADULT - PROBLEM SELECTOR PLAN 8
- hx of hemolytic anemia, follows with Dr. Rosario as outpatient  - switched pred to now solucortef 75 q8h  - 1u pRBC transfused overnight 3/1 - noted to have swollen right arm compared to left  - US of UE negative for DVT  - wound care consulted for blister on right arm

## 2025-03-03 NOTE — PROGRESS NOTE ADULT - ASSESSMENT
73 year old male PMH HTN, HLD, CKD, nonischemic cardiomyopathy, chronic systolic heart failure s/p HM2 LVAD (6/2017), s/p heart transplant from Hep. 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 Superimposed infection in the collapsed lungs is not excluded.    CT abdomen pelvis (February 22) Since prior study 2/22/2025 interval development of diffusely dilated small bowel loops with air-fluid levels. Contrast from prior study has passed into the colon. Findings are favored to represent ileus or less likely partial small bowel obstruction rather than mechanical obstruction. No manifest signs of bowel ischemia.    At this point suspect that Pseudomonas bacteremia was secondary to either a respiratory source or gastrointestinal translocation.  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    CT chest/abdomen/pelvis 2/26  Bronchial mucus plugging with right lower lobe atelectasis.  No CT evidence of occult abscess in the abdomen or pelvis.    MR Chest 2/28    IMPRESSION:  1.  Sternal wires are present without osseous fusion.  2.  Marrow appears preserved given the metallic artifact without evidence   for osteomyelitis.  3.  Sternoclavicular joints appear preserved.  4.  Edema of the right pectoralis along the manubrium and clavicle with   surrounding soft tissue edema. Changes may reflect cellulitis and   myositis with the given history versus injury.  5.  No abscess is seen.      Antimicrobials  Zosyn 2/19/ 2/20  Meropenem 2/20-2/24  Zosyn 2/25-    # AMS  # Pseudomonas bacteremia  # NORMAN on CRRT  --Encephalopathy; Likely multifactorial  --Plan for LP to r/o infectious etiology  -- Please record opening pressure, send Cell count, glucose, protein, routine gram stain and culture (bacterial, fungal), CSF PCR panel, HSV 1/2 PCR, CMV PCR, JCV PCR, Toxoplasma PCR, Cryptococcal CSF Antigen, WNV PCR and WNV Serology, VZV PCR   -- Switched Zosyn to Meropenem for empirical coverage and to reestablish CNS coverage  -- If there is delay in obtaining LP today would start Ganciclovir 2.5 mg/kg IV Q24H (if on CRRT) (For HSV 1/2 and CMV coverage)   --Will check repeat serum CMV PCR (last on 2/22 negative), Toxoplasma Serum PCR, HSV 1/2 Serum PCR, WNV Serology and PCR (albeit unlikely etiology)  -- Continue to follow CBC with diff  -- Continue to follow transaminases  --Continue to follow temperature curve  --Follow up on preliminary blood cultures    # Nodular lesion along manubrium  -- MR with underlying cellulitis/myositis   -- send Vanco trough prior to next dose    # Heart Transplant Recipient, Prophylactic Antibiotic  --Continue Atovaquone 1,500 mg PO Q24H for PCP PPx  --Continue Valcyte 450 mg M/W/F for CMV PPx    Gary Lujan MD  Can be called via Teams  After 5pm/weekends [phone]   73 year old male PMH HTN, HLD, CKD, nonischemic cardiomyopathy, chronic systolic heart failure s/p HM2 LVAD (6/2017), s/p heart transplant from Hep. 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 Superimposed infection in the collapsed lungs is not excluded.    CT abdomen pelvis (February 22) Since prior study 2/22/2025 interval development of diffusely dilated small bowel loops with air-fluid levels. Contrast from prior study has passed into the colon. Findings are favored to represent ileus or less likely partial small bowel obstruction rather than mechanical obstruction. No manifest signs of bowel ischemia.    At this point suspect that Pseudomonas bacteremia was secondary to either a respiratory source or gastrointestinal translocation.  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    CT chest/abdomen/pelvis 2/26  Bronchial mucus plugging with right lower lobe atelectasis.  No CT evidence of occult abscess in the abdomen or pelvis.    MR Chest 2/28    IMPRESSION:  1.  Sternal wires are present without osseous fusion.  2.  Marrow appears preserved given the metallic artifact without evidence   for osteomyelitis.  3.  Sternoclavicular joints appear preserved.  4.  Edema of the right pectoralis along the manubrium and clavicle with   surrounding soft tissue edema. Changes may reflect cellulitis and   myositis with the given history versus injury.  5.  No abscess is seen.      Antimicrobials  Zosyn 2/19/ 2/20  Meropenem 2/20-2/24  Zosyn 2/25-    # AMS  # Pseudomonas bacteremia  # NORMAN on CRRT  --Encephalopathy; Likely multifactorial  --Plan for LP to r/o infectious etiology- white matter changes could rep[resent PML (etiology ASCENCION virus)  -- Please record opening pressure, send Cell count, glucose, protein, routine gram stain and culture (bacterial, fungal), CSF PCR panel, HSV 1/2 PCR, CMV PCR, JCV PCR, Toxoplasma PCR, Cryptococcal CSF Antigen, WNV PCR and WNV Serology, VZV PCR   -- Switched Zosyn to Meropenem for empirical coverage and to reestablish CNS coverage  -- If there is delay in obtaining LP today would start Ganciclovir 2.5 mg/kg IV Q24H (if on CRRT) (For HSV 1/2 and CMV coverage)   --- Continue to follow CBC with diff  -- Continue to follow transaminases  --Continue to follow temperature curve  --Follow up on blood cultures no growth  -- repeat  CMV viral load serum    # Nodular lesion along manubrium  -- MR with underlying cellulitis/myositis   -- send Vanco trough prior to next dose    # Heart Transplant Recipient, Prophylactic Antibiotic  --Continue Atovaquone 1,500 mg PO Q24H for PCP PPx  --Continue Valcyte 450 mg M/W/F for CMV PPx    Gary Lujan MD  Can be called via Teams  After 5pm/weekends [phone]

## 2025-03-03 NOTE — PROGRESS NOTE ADULT - SUBJECTIVE AND OBJECTIVE BOX
[facility] Division of Kidney Diseases & Hypertension  FOLLOW UP NOTE  [phone]--------------------------------------------------------------------------------  Chief Complaint: NORMAN on CKD, now on CRRT    24 hour events/subjective: Pt seen and evaluated this morning in the ICU. Pt seen while on CRRT, tolerating treatment and NTDC functioning well. Per RN no issues with clotting/ dialysis circuit. Unable to obtain ROS.     PAST HISTORY  --------------------------------------------------------------------------------  No significant changes to PMH, PSH, FHx, SHx, unless otherwise noted    ALLERGIES & MEDICATIONS  --------------------------------------------------------------------------------  Allergies    No Known Allergies    Intolerances    Standing Inpatient Medications  aspirin  chewable 81 milliGRAM(s) Oral daily  atorvastatin 10 milliGRAM(s) Oral at bedtime  atovaquone  Suspension 1500 milliGRAM(s) Oral daily  chlorhexidine 2% Cloths 1 Application(s) Topical daily  cholecalciferol 1000 Unit(s) Oral daily  CRRT Treatment    <Continuous>  dexMEDEtomidine Infusion 0.5 MICROgram(s)/kG/Hr IV Continuous <Continuous>  ganciclovir IVPB 230 milliGRAM(s) IV Intermittent every 24 hours  heparin  Infusion 1000 Unit(s)/Hr IV Continuous <Continuous>  insulin lispro (ADMELOG) corrective regimen sliding scale   SubCutaneous every 6 hours  meropenem  IVPB 1000 milliGRAM(s) IV Intermittent every 8 hours  metoprolol tartrate 25 milliGRAM(s) Oral two times a day  nystatin    Suspension 134281 Unit(s) Oral four times a day  pantoprazole  Injectable 40 milliGRAM(s) IV Push two times a day  Phoxillum Filtration BK 4 / 2.5 5000 milliLiter(s) CRRT <Continuous>  Phoxillum Filtration BK 4 / 2.5 5000 milliLiter(s) CRRT <Continuous>  Phoxillum Filtration BK 4 / 2.5 5000 milliLiter(s) CRRT <Continuous>  polyethylene glycol 3350 17 Gram(s) Oral daily  predniSONE   Tablet 15 milliGRAM(s) Oral daily  senna 2 Tablet(s) Oral daily  tacrolimus    0.5 mG/mL Suspension 3.5 milliGRAM(s) Oral <User Schedule>  vancomycin  IVPB 750 milliGRAM(s) IV Intermittent every 12 hours    PRN Inpatient Medications  OLANZapine Injectable 5 milliGRAM(s) IntraMuscular every 12 hours PRN    REVIEW OF SYSTEMS  --------------------------------------------------------------------------------  unable to obtain due to clinical status     VITALS/PHYSICAL EXAM  --------------------------------------------------------------------------------  T(C): 36.7 (03-03-25 @ 08:00), Max: 37.3 (03-02-25 @ 15:00)  HR: 83 (03-03-25 @ 12:00) (83 - 130)  BP: 154/65 (03-03-25 @ 12:00) (108/49 - 160/70)  RR: 14 (03-03-25 @ 12:00) (11 - 31)  SpO2: 98% (03-03-25 @ 12:00) (94% - 100%)  Wt(kg): --    03-02-25 @ 07:01  -  03-03-25 @ 07:00  --------------------------------------------------------  IN: 2184.6 mL / OUT: 2111 mL / NET: 73.6 mL    03-03-25 @ 07:01  -  03-03-25 @ 12:20  --------------------------------------------------------  IN: 505 mL / OUT: 512 mL / NET: -7 mL    Physical Exam:  Gen: ill-appearing but NAD  Pulm: CTA B/L  CV: S1S2  Abd: Soft, +BS   Ext: No LE edema B/L  Neuro: sedated  Skin: Warm and dry  Dialysis access: L-femoral non- tunnelled HD cath in place - being used for CRRT.     LABS/STUDIES  --------------------------------------------------------------------------------              7.5    19.97 >-----------<  231      [03-03-25 @ 01:12]              23.9     135  |  102  |  15  ----------------------------<  148      [03-03-25 @ 01:12]  3.9   |  24  |  1.40        Ca     7.4     [03-03-25 @ 01:12]      Mg     2.4     [03-03-25 @ 01:12]      Phos  2.3     [03-03-25 @ 01:12]    TPro  4.8  /  Alb  2.4  /  TBili  1.0  /  DBili  x   /  AST  23  /  ALT  13  /  AlkPhos  49  [03-03-25 @ 01:12]    PT/INR: PT 12.1 , INR 1.06       [03-03-25 @ 06:38]  PTT: 61.1       [03-03-25 @ 06:38]          [03-02-25 @ 13:01]    Creatinine Trend:  SCr 1.40 [03-03 @ 01:12]  SCr 1.40 [03-02 @ 17:11]  SCr 1.43 [03-02 @ 13:01]  SCr 1.43 [03-02 @ 05:55]  SCr 1.48 [03-02 @ 00:55]

## 2025-03-04 NOTE — PROGRESS NOTE ADULT - SUBJECTIVE AND OBJECTIVE BOX
PATIENT: BILLY RUSSELL, MRN: [id number]    CHIEF COMPLAINT: Patient is a 74y old  Male who presents with a chief complaint of Lethargy (04 Mar 2025 08:15)      INTERVAL HISTORY/OVERNIGHT EVENTS: No overnight events. Denies abdominal pain, chest pain or SOB, cough. Has been ambulating with assistance. Oriented to person, place, and time. Breathing comfortably on room air.    REVIEW OF SYSTEMS:    Constitutional:     [ ] negative [ ] fevers [ ] chills [ ] weight loss [ ] weight gain  HEENT:                  [ ] negative [ ] dry eyes [ ] eye irritation [ ] postnasal drip [ ] nasal congestion  CV:                         [ ] negative  [ ] chest pain [ ] orthopnea [ ] palpitations [ ] murmur  Resp:                     [ ] negative [ ] cough [ ] shortness of breath [ ] dyspnea [ ] wheezing [ ] sputum [ ] hemoptysis  GI:                          [ ] negative [ ] nausea [ ] vomiting [ ] diarrhea [ ] constipation [ ] abd pain [ ] dysphagia   :                        [ ] negative [ ] dysuria [ ] nocturia [ ] hematuria [ ] increased urinary frequency  Musculoskeletal: [ ] negative [ ] back pain [ ] myalgias [ ] arthralgias [ ] fracture  Skin:                       [ ] negative [ ] rash [ ] itch  Neurological:        [ ] negative [ ] headache [ ] dizziness [ ] syncope [ ] weakness [ ] numbness  Psychiatric:           [ ] negative [ ] anxiety [ ] depression  Endocrine:            [ ] negative [ ] diabetes [ ] thyroid problem  Heme/Lymph:      [ ] negative [ ] anemia [ ] bleeding problem  Allergic/Immune: [ ] negative [ ] itchy eyes [ ] nasal discharge [ ] hives [ ] angioedema    [ ] All other systems negative  [ ] Unable to assess ROS because ________.    MEDICATIONS:  MEDICATIONS  (STANDING):  aspirin  chewable 81 milliGRAM(s) Oral daily  atorvastatin 10 milliGRAM(s) Oral at bedtime  atovaquone  Suspension 1500 milliGRAM(s) Oral daily  chlorhexidine 2% Cloths 1 Application(s) Topical daily  cholecalciferol 1000 Unit(s) Oral daily  dexMEDEtomidine Infusion 0.5 MICROgram(s)/kG/Hr (11.3 mL/Hr) IV Continuous <Continuous>  ganciclovir IVPB 230 milliGRAM(s) IV Intermittent every 24 hours  heparin  Infusion 1000 Unit(s)/Hr (8 mL/Hr) IV Continuous <Continuous>  insulin lispro (ADMELOG) corrective regimen sliding scale   SubCutaneous every 6 hours  meropenem  IVPB 1000 milliGRAM(s) IV Intermittent every 8 hours  metoprolol tartrate 25 milliGRAM(s) Oral two times a day  nystatin    Suspension 683763 Unit(s) Oral four times a day  pantoprazole  Injectable 40 milliGRAM(s) IV Push two times a day  polyethylene glycol 3350 17 Gram(s) Oral daily  predniSONE   Tablet 15 milliGRAM(s) Oral daily  senna 2 Tablet(s) Oral daily  tacrolimus    0.5 mG/mL Suspension 3 milliGRAM(s) Oral <User Schedule>  vancomycin  IVPB 750 milliGRAM(s) IV Intermittent every 12 hours    MEDICATIONS  (PRN):  OLANZapine Injectable 5 milliGRAM(s) IntraMuscular every 12 hours PRN agitation      ALLERGIES: Allergies    No Known Allergies    Intolerances        OBJECTIVE:  ICU Vital Signs Last 24 Hrs  T(C): 35.3 (04 Mar 2025 07:00), Max: 37.3 (03 Mar 2025 14:00)  T(F): 95.5 (04 Mar 2025 07:00), Max: 99.2 (03 Mar 2025 14:00)  HR: 77 (04 Mar 2025 08:00) (72 - 94)  BP: 104/51 (04 Mar 2025 08:00) (98/49 - 158/66)  BP(mean): 74 (04 Mar 2025 08:00) (69 - 95)  ABP: --  ABP(mean): --  RR: 16 (04 Mar 2025 08:00) (10 - 31)  SpO2: 99% (04 Mar 2025 08:00) (93% - 100%)    O2 Parameters below as of 04 Mar 2025 08:00  Patient On (Oxygen Delivery Method): room air            Adult Advanced Hemodynamics Last 24 Hrs  CVP(mm Hg): --  CVP(cm H2O): --  CO: --  CI: --  PA: --  PA(mean): --  PCWP: --  SVR: --  SVRI: --  PVR: --  PVRI: --  CAPILLARY BLOOD GLUCOSE      POCT Blood Glucose.: 172 mg/dL (04 Mar 2025 06:51)  POCT Blood Glucose.: 144 mg/dL (03 Mar 2025 23:17)    CAPILLARY BLOOD GLUCOSE      POCT Blood Glucose.: 172 mg/dL (04 Mar 2025 06:51)    I&O's Summary    03 Mar 2025 07:01  -  04 Mar 2025 07:00  --------------------------------------------------------  IN: 2774.1 mL / OUT: 1349 mL / NET: 1425.1 mL    04 Mar 2025 07:01  -  04 Mar 2025 09:27  --------------------------------------------------------  IN: 145.8 mL / OUT: 0 mL / NET: 145.8 mL      Daily     Daily Weight in k (04 Mar 2025 01:00)    PHYSICAL EXAMINATION:  General: Comfortable, no acute distress, cooperative with exam.  HEENT: Moist mucous membranes.  Respiratory: CTAB, normal respiratory effort, no coughing, wheezes, crackles, or rales.  CV: RRR, S1S2, no murmurs, rubs or gallops. No JVD. Distal pulses intact.  Abdominal: Soft, nontender, nondistended, no rebound or guarding, normal bowel sounds.  Neurology: AOx3, no focal neuro defects, RUVALCABA x 4.  Extremities: No pitting edema, + Peripheral pulses.  Cath site:   Tubes:    LABS:                          7.8    15.15 )-----------( 259      ( 04 Mar 2025 01:05 )             24.8     03-04    134[L]  |  103  |  20  ----------------------------<  162[H]  3.7   |  21[L]  |  1.87[H]    Ca    7.3[L]      04 Mar 2025 01:05  Phos  3.7     03-04  Mg     2.5     03-04    TPro  4.6[L]  /  Alb  2.3[L]  /  TBili  0.7  /  DBili  x   /  AST  16  /  ALT  11  /  AlkPhos  51  03-04    LIVER FUNCTIONS - ( 04 Mar 2025 01:05 )  Alb: 2.3 g/dL / Pro: 4.6 g/dL / ALK PHOS: 51 U/L / ALT: 11 U/L / AST: 16 U/L / GGT: x           PT/INR - ( 04 Mar 2025 01:05 )   PT: 11.8 sec;   INR: 1.03 ratio         PTT - ( 04 Mar 2025 01:05 )  PTT:71.7 sec        Urinalysis Basic - ( 04 Mar 2025 01:05 )    Color: x / Appearance: x / SG: x / pH: x  Gluc: 162 mg/dL / Ketone: x  / Bili: x / Urobili: x   Blood: x / Protein: x / Nitrite: x   Leuk Esterase: x / RBC: x / WBC x   Sq Epi: x / Non Sq Epi: x / Bacteria: x        TELEMETRY:     EKG:     IMAGING:    PATIENT: BILLY RUSSELL, MRN: [id number]    CHIEF COMPLAINT: Patient is a 74y old  Male who presents with a chief complaint of Lethargy (04 Mar 2025 08:15)      INTERVAL HISTORY/OVERNIGHT EVENTS: No overnight events. Pt continues to be agitated.  Denies abdominal pain, chest pain or SOB, cough. Breathing comfortably on room air.    REVIEW OF SYSTEMS:    Constitutional:     [ ] negative [ ] fevers [ ] chills [ ] weight loss [ ] weight gain  HEENT:                  [ ] negative [ ] dry eyes [ ] eye irritation [ ] postnasal drip [ ] nasal congestion  CV:                         [ ] negative  [ ] chest pain [ ] orthopnea [ ] palpitations [ ] murmur  Resp:                     [ ] negative [ ] cough [ ] shortness of breath [ ] dyspnea [ ] wheezing [ ] sputum [ ] hemoptysis  GI:                          [ ] negative [ ] nausea [ ] vomiting [ ] diarrhea [ ] constipation [ ] abd pain [ ] dysphagia   :                        [ ] negative [ ] dysuria [ ] nocturia [ ] hematuria [ ] increased urinary frequency  Musculoskeletal: [ ] negative [ ] back pain [ ] myalgias [ ] arthralgias [ ] fracture  Skin:                       [ ] negative [ ] rash [ ] itch  Neurological:        [ ] negative [ ] headache [ ] dizziness [ ] syncope [ ] weakness [ ] numbness  Psychiatric:           [ ] negative [ ] anxiety [ ] depression  Endocrine:            [ ] negative [ ] diabetes [ ] thyroid problem  Heme/Lymph:      [ ] negative [ ] anemia [ ] bleeding problem  Allergic/Immune: [ ] negative [ ] itchy eyes [ ] nasal discharge [ ] hives [ ] angioedema    [ ] All other systems negative  [x ] Unable to assess ROS because __AMS_____.    MEDICATIONS:  MEDICATIONS  (STANDING):  aspirin  chewable 81 milliGRAM(s) Oral daily  atorvastatin 10 milliGRAM(s) Oral at bedtime  atovaquone  Suspension 1500 milliGRAM(s) Oral daily  chlorhexidine 2% Cloths 1 Application(s) Topical daily  cholecalciferol 1000 Unit(s) Oral daily  dexMEDEtomidine Infusion 0.5 MICROgram(s)/kG/Hr (11.3 mL/Hr) IV Continuous <Continuous>  ganciclovir IVPB 230 milliGRAM(s) IV Intermittent every 24 hours  heparin  Infusion 1000 Unit(s)/Hr (8 mL/Hr) IV Continuous <Continuous>  insulin lispro (ADMELOG) corrective regimen sliding scale   SubCutaneous every 6 hours  meropenem  IVPB 1000 milliGRAM(s) IV Intermittent every 8 hours  metoprolol tartrate 25 milliGRAM(s) Oral two times a day  nystatin    Suspension 010401 Unit(s) Oral four times a day  pantoprazole  Injectable 40 milliGRAM(s) IV Push two times a day  polyethylene glycol 3350 17 Gram(s) Oral daily  predniSONE   Tablet 15 milliGRAM(s) Oral daily  senna 2 Tablet(s) Oral daily  tacrolimus    0.5 mG/mL Suspension 3 milliGRAM(s) Oral <User Schedule>  vancomycin  IVPB 750 milliGRAM(s) IV Intermittent every 12 hours    MEDICATIONS  (PRN):  OLANZapine Injectable 5 milliGRAM(s) IntraMuscular every 12 hours PRN agitation      ALLERGIES: Allergies    No Known Allergies    Intolerances        OBJECTIVE:  ICU Vital Signs Last 24 Hrs  T(C): 35.3 (04 Mar 2025 07:00), Max: 37.3 (03 Mar 2025 14:00)  T(F): 95.5 (04 Mar 2025 07:00), Max: 99.2 (03 Mar 2025 14:00)  HR: 77 (04 Mar 2025 08:00) (72 - 94)  BP: 104/51 (04 Mar 2025 08:00) (98/49 - 158/66)  BP(mean): 74 (04 Mar 2025 08:00) (69 - 95)  ABP: --  ABP(mean): --  RR: 16 (04 Mar 2025 08:00) (10 - 31)  SpO2: 99% (04 Mar 2025 08:00) (93% - 100%)    O2 Parameters below as of 04 Mar 2025 08:00  Patient On (Oxygen Delivery Method): room air            Adult Advanced Hemodynamics Last 24 Hrs  CVP(mm Hg): --  CVP(cm H2O): --  CO: --  CI: --  PA: --  PA(mean): --  PCWP: --  SVR: --  SVRI: --  PVR: --  PVRI: --  CAPILLARY BLOOD GLUCOSE      POCT Blood Glucose.: 172 mg/dL (04 Mar 2025 06:51)  POCT Blood Glucose.: 144 mg/dL (03 Mar 2025 23:17)    CAPILLARY BLOOD GLUCOSE      POCT Blood Glucose.: 172 mg/dL (04 Mar 2025 06:51)    I&O's Summary    03 Mar 2025 07:01  -  04 Mar 2025 07:00  --------------------------------------------------------  IN: 2774.1 mL / OUT: 1349 mL / NET: 1425.1 mL    04 Mar 2025 07:01  -  04 Mar 2025 09:27  --------------------------------------------------------  IN: 145.8 mL / OUT: 0 mL / NET: 145.8 mL      Daily     Daily Weight in k (04 Mar 2025 01:00)    PHYSICAL EXAMINATION:  Constitutional: laying in bed, arousable  HEENT: NC/AT, PERRLA, EOMI, no conjunctival pallor or scleral icterus, MMM  Neck: Supple, no JVD  Respiratory: CTA B/L. No w/r/r.   Cardiovascular: regular rate, normal S1 and S2, no m/r/g.   Gastrointestinal: mildly distended, +BS, nontender, no guarding or rebound tenderness, no palpable masses  Extremities: RUE edematous; no cyanosis, clubbing.  Neurological: AAOx0  Skin: approx. 3x3cm area of hyperpigmentation and swelling to R parasternal upper chest wall, no fluctuance no open wounds.  LABS:                          7.8    15.15 )-----------( 259      ( 04 Mar 2025 01:05 )             24.8     03-04    134[L]  |  103  |  20  ----------------------------<  162[H]  3.7   |  21[L]  |  1.87[H]    Ca    7.3[L]      04 Mar 2025 01:05  Phos  3.7     03-04  Mg     2.5     03-04    TPro  4.6[L]  /  Alb  2.3[L]  /  TBili  0.7  /  DBili  x   /  AST  16  /  ALT  11  /  AlkPhos  51  03-04    LIVER FUNCTIONS - ( 04 Mar 2025 01:05 )  Alb: 2.3 g/dL / Pro: 4.6 g/dL / ALK PHOS: 51 U/L / ALT: 11 U/L / AST: 16 U/L / GGT: x           PT/INR - ( 04 Mar 2025 01:05 )   PT: 11.8 sec;   INR: 1.03 ratio         PTT - ( 04 Mar 2025 01:05 )  PTT:71.7 sec        Urinalysis Basic - ( 04 Mar 2025 01:05 )    Color: x / Appearance: x / SG: x / pH: x  Gluc: 162 mg/dL / Ketone: x  / Bili: x / Urobili: x   Blood: x / Protein: x / Nitrite: x   Leuk Esterase: x / RBC: x / WBC x   Sq Epi: x / Non Sq Epi: x / Bacteria: x        TELEMETRY:     EKG:     IMAGING:

## 2025-03-04 NOTE — PROGRESS NOTE ADULT - ATTENDING COMMENTS
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 His cultures have cleared and he is now hemodynamically stable but has had persistent altered mental status of unclear etiology undergoing further workup. He has normal graft function (TTE 2/2025).     -Continue tacrolimus for goal trough 3-5.   -Continue prednisone 15 mg daily   -Antibiotics per ID, currently on vanc/meropenem as well as ganciclovir to cover possible CNS causes. LP pending   -Neuro following re: altered mental status, no clear pathology on MRI. Eventual LP as above.   -Prognosis guarded, family meeting with HCP today/tomorrow pending her availability

## 2025-03-04 NOTE — PROGRESS NOTE ADULT - PROBLEM SELECTOR PLAN 1
- s/p LVAD explant and OHT 2/23/18 with hepatitis c donor  - blood pressure meds on hold as below  - continue ASA 81 mg PO QD and atorva 10  - holding home bactrim due to hyperkalemia (was on Bactrim S/S PO M/W/F). Now atovaquone  - Ganciclovir 2.5 mg/kg IV Q24H (if on CRRT) (For HSV 1/2 and CMV coverage), continue nystatin  - continue nystatin 4 times daily  - Adjust prograf for goal 3-5  - tacro 3mg BID  - prednisone at 15mg daily

## 2025-03-04 NOTE — PROGRESS NOTE ADULT - ASSESSMENT
73M w/ HTN, HLD, nonischemic cardiomyopathy, chronic systolic heart failure s/p HM2 LVAD (6/2017), s/p heart transplant from Hep. 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 Admitted with septic shock w/ pseudomonus bacteremia.    NORMAN on CKD3 iso septic shock w/ pseudomonus bacteremia, now requiring dialysis.  [facility] reviewed. Scr ranged from 1.3-2.1 in 2023. Most recent Scr was 1.75 on 1/30/24. On admission Scr 1.92 (2/19), worsened to 6.89 (2/23). Initiated on CRRT (2/23) iso worsening oliguric renal failure w/ hemodynamic instability. Pts hemodynamics have now stabilized off vasopressors and CRRT discontinued on 3/3. Labs from today reviewed. Electrolytes within acceptable range. BP trend low this morning, no urgency for dialysis today. Will assess dialysis needs daily. Pt remains anuric, recommend bumex 4mg IVP x1. Monitor labs, daily wts and I/Os. Immunosuppression as per primary team.     If you have any questions, please feel free to contact me  Boston Caballero  Nephrology Fellow  [employer]/Page [phone]  (After 5pm or on weekends please page the on-call fellow)

## 2025-03-04 NOTE — PROGRESS NOTE ADULT - SUBJECTIVE AND OBJECTIVE BOX
Patient seen and examined at bedside.    Overnight Events: still altered    REVIEW OF SYSTEMS:  CONSTITUTIONAL: No weakness, fevers or chills  EYES/ENT: No visual changes;  No dysphagia  NECK: No pain or stiffness  RESPIRATORY: No cough, wheezing, hemoptysis; No shortness of breath  CARDIOVASCULAR: No chest pain or palpitations; No lower extremity edema  GASTROINTESTINAL: No abdominal or epigastric pain. No nausea, vomiting, or hematemesis; No diarrhea or constipation. No melena or hematochezia.  BACK: No back pain  GENITOURINARY: No dysuria, frequency or hematuria  NEUROLOGICAL: No numbness or weakness  SKIN: No itching, burning, rashes, or lesions   All other review of systems is negative unless indicated above.            Current Meds:  atorvastatin 10 milliGRAM(s) Oral at bedtime  atovaquone  Suspension 1500 milliGRAM(s) Oral daily  chlorhexidine 2% Cloths 1 Application(s) Topical daily  cholecalciferol 1000 Unit(s) Oral daily  dexMEDEtomidine Infusion 0.5 MICROgram(s)/kG/Hr IV Continuous <Continuous>  ganciclovir IVPB 230 milliGRAM(s) IV Intermittent every 24 hours  heparin  Infusion 1000 Unit(s)/Hr IV Continuous <Continuous>  insulin lispro (ADMELOG) corrective regimen sliding scale   SubCutaneous every 6 hours  LORazepam   Injectable 0.5 milliGRAM(s) IV Push every 6 hours PRN  metoprolol tartrate 25 milliGRAM(s) Oral two times a day  nystatin    Suspension 591763 Unit(s) Oral four times a day  pantoprazole  Injectable 40 milliGRAM(s) IV Push two times a day  polyethylene glycol 3350 17 Gram(s) Oral daily  predniSONE   Tablet 15 milliGRAM(s) Oral daily  QUEtiapine 25 milliGRAM(s) Oral every 6 hours  senna 2 Tablet(s) Oral daily  tacrolimus    0.5 mG/mL Suspension 3 milliGRAM(s) Oral <User Schedule>      Vitals:  T(F): 94.8 (03-04), Max: 99.2 (03-03)  HR: 75 (03-04) (72 - 94)  BP: 101/49 (03-04) (98/49 - 151/60)  RR: 19 (03-04)  SpO2: 100% (03-04)  I&O's Summary    03 Mar 2025 07:01  -  04 Mar 2025 07:00  --------------------------------------------------------  IN: 2774.1 mL / OUT: 1349 mL / NET: 1425.1 mL    04 Mar 2025 07:01  -  04 Mar 2025 12:25  --------------------------------------------------------  IN: 465.4 mL / OUT: 0 mL / NET: 465.4 mL        Physical Exam:  Appearance: No acute distress  Eyes: PERRL, EOMI, pink conjunctiva  HEENT: Normal oral mucosa  Cardiovascular: tachycardic   Respiratory: Clear to auscultation bilaterally  Gastrointestinal: soft, non-tender, non-distended with normal bowel sounds  Musculoskeletal: No clubbing; no joint deformity   Neurologic: Non-focal  Lymphatic: No lymphadenopathy  Psychiatry: minimally responsive on precedex  Skin: No rashes, ecchymoses, or cyanosis                          7.8    15.15 )-----------( 259      ( 04 Mar 2025 01:05 )             24.8     03-04    134[L]  |  103  |  20  ----------------------------<  162[H]  3.7   |  21[L]  |  1.87[H]    Ca    7.3[L]      04 Mar 2025 01:05  Phos  3.7     03-04  Mg     2.5     03-04    TPro  4.6[L]  /  Alb  2.3[L]  /  TBili  0.7  /  DBili  x   /  AST  16  /  ALT  11  /  AlkPhos  51  03-04    PT/INR - ( 04 Mar 2025 01:05 )   PT: 11.8 sec;   INR: 1.03 ratio         PTT - ( 04 Mar 2025 01:05 )  PTT:71.7 sec

## 2025-03-04 NOTE — PROGRESS NOTE ADULT - ASSESSMENT
75yo M w/ pmhx HTN, HLD, nonischemic cardiomyopathy, chronic systolic heart failure s/p HM2 LVAD (6/2017), s/p heart transplant from Hep. 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 Found to have septic shock, (2/19 BCx pseudo, 2/22 BCx NGTD). Course c/b anuric NORMAN requiring CVVH. Also persistent AMS. Unclear bacterial source.    TTE 2/25/25: EF 54%, mildly reduced RV function

## 2025-03-04 NOTE — PROGRESS NOTE ADULT - PROBLEM SELECTOR PLAN 2
- Presented with T 91.4F, WBC 21, pseudomonas bacteremia  - Bcx 2/20 w/ Pseudomonas koreensis, Ucx NGTD, repeat cultures 2/22 NGTD  - CT C/A/P: Diffusely dilated small bowel loops with air-fluid levels: ileus or less likely partial small bowel obstruction rather than mechanical obstruction. No manifest signs of bowel ischemia. Interval worsening of right lower lobe and right middle lobe atelectasis secondary to elevated right hemidiaphragm when compared to prior CT dated 7/11/2023. Superimposed infection in the collapsed lungs is not excluded  - s/p Zosyn, switched to meropenem for CNS coverage, and continue vancomycin  - Repeating serum CMV PCR (last on 2/22 negative), Toxoplasma Serum IgM neg, HSV 1/2 not detected, WNV Serology and PCR   - Appreciate Transplant ID recommendations

## 2025-03-04 NOTE — PROGRESS NOTE ADULT - ATTENDING COMMENTS
History of heart transplant in 2018  Admitted with encephalopathy of unknown origin  A+Ox1, delirious, on Precedex and Zyprexa - transition Zyprexa to Seroquel/Ativan and wean Precedex  Will discuss LP with Neuro  Hemodynamics acceptable, no pressors or inotropes  Stop stress dose steroids and resume outpatient Prednisone along with outpatient Tacro  TTE with preserved LVEF  Tolerating tube feeds  Oliguric NORMAN, compensated on exam; CVVH held yesterday - diuretic challenge today  H/H low but acceptable on Heparin drip for afib   Afebrile, on empiric Meropenem and Vancomycin, cultures negative  Sugars controlled  SAMEERA Pickard 3/3

## 2025-03-04 NOTE — PROGRESS NOTE ADULT - PROBLEM SELECTOR PLAN 7
- was on Eliquis 5 mg PO BID at home for hx of of afib and IJ thrombi  -hep gtt while pending potential procedures

## 2025-03-04 NOTE — PROGRESS NOTE ADULT - SUBJECTIVE AND OBJECTIVE BOX
INFECTIOUS DISEASES FOLLOW UP-- Sujey Lujan  [phone]    This is a follow up note for this  74yMale with  Hyperkalemia  delerium vs encephalopathy        ROS:  CONSTITUTIONAL:  No fever, good appetite  CARDIOVASCULAR:  No chest pain or palpitations  RESPIRATORY:  No dyspnea  GASTROINTESTINAL:  No nausea, vomiting, diarrhea, or abdominal pain  GENITOURINARY:  No dysuria  NEUROLOGIC:  No headache,     Allergies    No Known Allergies    Intolerances        ANTIBIOTICS/RELEVANT:  antimicrobials  atovaquone  Suspension 1500 milliGRAM(s) Oral daily  nystatin    Suspension 688803 Unit(s) Oral four times a day    immunologic:  tacrolimus    0.5 mG/mL Suspension 3 milliGRAM(s) Oral <User Schedule>    OTHER:  atorvastatin 10 milliGRAM(s) Oral at bedtime  chlorhexidine 2% Cloths 1 Application(s) Topical daily  cholecalciferol 1000 Unit(s) Oral daily  heparin  Infusion 1000 Unit(s)/Hr IV Continuous <Continuous>  insulin lispro (ADMELOG) corrective regimen sliding scale   SubCutaneous every 6 hours  LORazepam   Injectable 0.5 milliGRAM(s) IV Push every 6 hours PRN  metoprolol tartrate 25 milliGRAM(s) Oral two times a day  pantoprazole  Injectable 40 milliGRAM(s) IV Push two times a day  polyethylene glycol 3350 17 Gram(s) Oral daily  predniSONE   Tablet 15 milliGRAM(s) Oral daily  QUEtiapine 25 milliGRAM(s) Oral every 6 hours  senna 2 Tablet(s) Oral daily      Objective:  Vital Signs Last 24 Hrs  T(C): 37.8 (04 Mar 2025 17:00), Max: 37.8 (04 Mar 2025 17:00)  T(F): 100.1 (04 Mar 2025 17:00), Max: 100.1 (04 Mar 2025 17:00)  HR: 122 (04 Mar 2025 18:00) (72 - 122)  BP: 87/47 (04 Mar 2025 18:00) (87/47 - 151/60)  BP(mean): 61 (04 Mar 2025 18:00) (61 - 92)  RR: 22 (04 Mar 2025 18:00) (10 - 27)  SpO2: 100% (04 Mar 2025 18:00) (95% - 100%)    Parameters below as of 04 Mar 2025 18:00  Patient On (Oxygen Delivery Method): room air        PHYSICAL EXAM:  Constitutional:no acute distress but quite confused  Eyes:WALT, EOMI  Ear/Nose/Throat: no oral lesions, LIJ HD catheter	  Respiratory: clear BL  hematoma chest  Cardiovascular: S1S2  Gastrointestinal:soft, (+) BS, no tenderness  Extremities:no e/e/c  No Lymphadenopathy  IV sites not inflammed.    LABS:                        7.8    15.15 )-----------( 259      ( 04 Mar 2025 01:05 )             24.8     03-04    134[L]  |  103  |  20  ----------------------------<  162[H]  3.7   |  21[L]  |  1.87[H]    Ca    7.3[L]      04 Mar 2025 01:05  Phos  3.7     03-04  Mg     2.5     03-04    TPro  4.6[L]  /  Alb  2.3[L]  /  TBili  0.7  /  DBili  x   /  AST  16  /  ALT  11  /  AlkPhos  51  03-04    PT/INR - ( 04 Mar 2025 01:05 )   PT: 11.8 sec;   INR: 1.03 ratio         PTT - ( 04 Mar 2025 01:05 )  PTT:71.7 sec  Urinalysis Basic - ( 04 Mar 2025 01:05 )    Color: x / Appearance: x / SG: x / pH: x  Gluc: 162 mg/dL / Ketone: x  / Bili: x / Urobili: x   Blood: x / Protein: x / Nitrite: x   Leuk Esterase: x / RBC: x / WBC x   Sq Epi: x / Non Sq Epi: x / Bacteria: x        MICROBIOLOGY:            RECENT CULTURES:  02-28 @ 15:37  .Blood Blood  --  --  --    No growth at 72 Hours  --  02-27 @ 12:15  Blood Blood  --  --  --    No growth at 5 days  --      RADIOLOGY & ADDITIONAL STUDIES:    < from: Xray Chest 1 View- PORTABLE-Urgent (Xray Chest 1 View- PORTABLE-Urgent .) (03.03.25 @ 17:36) >  IMPRESSION:  Left IJ catheter with tip in the SVC/right atrium.    --- End of Report ---    < end of copied text >  < from: MR Head w/wo IV Cont (02.26.25 @ 23:19) >    IMPRESSION: Small subacute lacunar infarct within the right cerebellar   hemisphere/brachium pontis junction with associated cytotoxic edema.    No acute intracranial hemorrhage. No abnormal intracranial enhancement.    Multiple extensive patchy confluent nonspecific abnormal white matter   foci of T2/FLAIR prolongation statistically favoring microvascular type   changes.    --- End of Report ---    < end of copied text >    ASCENCION Virus PCR, Plasma: 162: Assay Range: 40 copies/mL to 1.00E+08 copies/mL  The limit of quantitation (LOQ) is 40 copies/mL. ASCENCION virus DNA  detected below the LOQ will be reported as Detected:<40 copies/mL.  This test was developed and its performance characteristics  determined by [facility]. It has not been cleared or approved  by the [place] Food and Drug Administration. Results should be used in  conjunction with clinical findings, and should not form the sole  basis for a diagnosis or treatment decision.  ____________________________________________________________  Performed at:  [facility]  [address] [place] [address], [address]  [place] [address]  : Campbell Cabral, PhD TERESA (GAVIN)  CLIA # [id number]  FLAG Interpretation: A = Abnormal, H = High, L = Low copies/mL (02.20.25 @ 21:38)

## 2025-03-04 NOTE — PHARMACOTHERAPY INTERVENTION NOTE - COMMENTS
BILLY MUNOZP 74yMale    aspirin  chewable 81 milliGRAM(s) Oral daily  atorvastatin 10 milliGRAM(s) Oral at bedtime  atovaquone  Suspension 1500 milliGRAM(s) Oral daily  buMETAnide IVPB 4 milliGRAM(s) IV Intermittent once  chlorhexidine 2% Cloths 1 Application(s) Topical daily  cholecalciferol 1000 Unit(s) Oral daily  dexMEDEtomidine Infusion 0.5 MICROgram(s)/kG/Hr IV Continuous <Continuous>  ganciclovir IVPB 230 milliGRAM(s) IV Intermittent every 24 hours  heparin  Infusion 1000 Unit(s)/Hr IV Continuous <Continuous>  insulin lispro (ADMELOG) corrective regimen sliding scale   SubCutaneous every 6 hours  LORazepam   Injectable 0.5 milliGRAM(s) IV Push every 6 hours PRN  meropenem  IVPB 1000 milliGRAM(s) IV Intermittent every 8 hours  metoprolol tartrate 25 milliGRAM(s) Oral two times a day  nystatin    Suspension 109332 Unit(s) Oral four times a day  pantoprazole  Injectable 40 milliGRAM(s) IV Push two times a day  polyethylene glycol 3350 17 Gram(s) Oral daily  predniSONE   Tablet 15 milliGRAM(s) Oral daily  QUEtiapine 25 milliGRAM(s) Oral every 6 hours  senna 2 Tablet(s) Oral daily  tacrolimus    0.5 mG/mL Suspension 3 milliGRAM(s) Oral <User Schedule>  vancomycin  IVPB 750 milliGRAM(s) IV Intermittent every 12 hours      03-04    134[L]  |  103  |  20  ----------------------------<  162[H]  3.7   |  21[L]  |  1.87[H]    Ca    7.3[L]      04 Mar 2025 01:05  Phos  3.7     03-04  Mg     2.5     03-04    TPro  4.6[L]  /  Alb  2.3[L]  /  TBili  0.7  /  DBili  x   /  AST  16  /  ALT  11  /  AlkPhos  51  03-04    Patient is now OFF CRRT and on intermittent HD (as needed). Need to renally dose adjust meropenem, vancomycin, and ganciclovir. Antibiotics should be dosed post HD sessions preferably (dosing as below).    Recommendations:  1. Adjust meropenem to 1g q24h post dialysis on HD days (starting tomorrow-3/5 as already received dose today)  2. Adjust Vancomycin should be dosed based on pre-HD level (attain trough level tomorrow morning 06:00 or pre-HD if getting HD tomorrow).  3. Adjust ganciclovir; if continuing induction dosing it should be dosed 1.25 mg/kg (115 mg) three times weekly POST HD (already received todays dose)    Discussed with CICU team.    Aubrey Worley, PharmD  PGY-2 Critical Care Pharmacy Resident  Available via [employer] Teams

## 2025-03-04 NOTE — PROGRESS NOTE ADULT - ASSESSMENT
73 year old male PMH HTN, HLD, CKD, nonischemic cardiomyopathy, chronic systolic heart failure s/p HM2 LVAD (6/2017), s/p heart transplant from Hep. 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 Superimposed infection in the collapsed lungs is not excluded.    CT abdomen pelvis (February 22) Since prior study 2/22/2025 interval development of diffusely dilated small bowel loops with air-fluid levels. Contrast from prior study has passed into the colon. Findings are favored to represent ileus or less likely partial small bowel obstruction rather than mechanical obstruction. No manifest signs of bowel ischemia.    At this point suspect that Pseudomonas bacteremia was secondary to either a respiratory source or gastrointestinal translocation.  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    CT chest/abdomen/pelvis 2/26  Bronchial mucus plugging with right lower lobe atelectasis.  No CT evidence of occult abscess in the abdomen or pelvis.    MR Chest 2/28    IMPRESSION:  1.  Sternal wires are present without osseous fusion.  2.  Marrow appears preserved given the metallic artifact without evidence   for osteomyelitis.  3.  Sternoclavicular joints appear preserved.  4.  Edema of the right pectoralis along the manubrium and clavicle with   surrounding soft tissue edema. Changes may reflect cellulitis and   myositis with the given history versus injury.  5.  No abscess is seen.      Antimicrobials  Zosyn 2/19/ 2/20  Meropenem completing 7 days of therapy, will change to HD dosing once started    # AMS  # Pseudomonas bacteremia  # NORMAN on CRRT  --Encephalopathy; Likely multifactorial  --consider PML given elevated serum ASCENCION virus value, if able favor sampling CSF  --if unable would try to decrease immunosuppression  Cytomegalovirus By PCR: NotDetec IU/mL (03.01.25 @ 02:16), serum  --- Continue to follow CBC with diff  -- Continue to follow transaminases  --Continue to follow temperature curve  --Follow up on blood cultures no growth    # Nodular lesion along manubrium  -- MR with underlying cellulitis/myositis   -- send Vanco trough prior to next dose last 10.3 on 3/2/25      # Heart Transplant Recipient, Prophylactic Antibiotic  --Continue Atovaquone 1,500 mg PO Q24H for PCP PPx  --Continue Valcyte 450 mg M/W/F for CMV PPx, will change to HD dosing once dialysis started    Gary Lujan MD  Can be called via Teams  After 5pm/weekends [phone]

## 2025-03-04 NOTE — PROGRESS NOTE ADULT - SUBJECTIVE AND OBJECTIVE BOX
Admission date:  CHIEF COMPLAINT:  HPI:  73yo M pmhx HTN, HLD, nonischemic cardiomyopathy, chronic systolic heart failure s/p HM2 LVAD (2017), s/p heart transplant from Hep. 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 On the phone, the patient's godbrother mentioned that on , the patient was in the car with his godbrother and was disoriented asking to get off on the road 4 blocks before his house. In addition, a caretaker stated that when she spoke to Mr. Hameed on the phone at 11am on , he was doing well. However, when the caretaker visited 2 hours later at 1pm, the patient was disoriented and felt his legs were heavy. This prompted the patient to come to the hospital.     In the ED: Hypothermic 91.4, HR 80, /60, RA   Patient noted to be Hyperkalemic (6.2) with Mixed respiratory and metabolic acidosis pH 7.2. - Patient was given Calcium Gluconate 2g, D50/Insulin 5 unit, 40 mg IV lasix, Lokelma 10mg.   Vancomycin and Zosyn  (2025 07:21)    INTERVAL HISTORY:    REVIEW OF SYSTEMS: Denies xxxxxx; all others negative    MEDICATIONS  (STANDING):  atorvastatin 10 milliGRAM(s) Oral at bedtime  atovaquone  Suspension 1500 milliGRAM(s) Oral daily  chlorhexidine 0.12% Liquid 15 milliLiter(s) Oral Mucosa every 12 hours  chlorhexidine 2% Cloths 1 Application(s) Topical daily  cholecalciferol 1000 Unit(s) Oral daily  heparin  Infusion 1000 Unit(s)/Hr (8 mL/Hr) IV Continuous <Continuous>  insulin lispro (ADMELOG) corrective regimen sliding scale   SubCutaneous every 6 hours  metoprolol tartrate 25 milliGRAM(s) Enteral Tube once  norepinephrine Infusion 0.7 MICROgram(s)/kG/Min (119 mL/Hr) IV Continuous <Continuous>  nystatin    Suspension 911680 Unit(s) Oral four times a day  pantoprazole  Injectable 40 milliGRAM(s) IV Push two times a day  polyethylene glycol 3350 17 Gram(s) Oral daily  predniSONE   Tablet 15 milliGRAM(s) Oral daily  QUEtiapine 25 milliGRAM(s) Oral every 6 hours  senna 2 Tablet(s) Oral daily  sodium bicarbonate  Injectable 50 milliEquivalent(s) IV Push every 5 minutes  tacrolimus    0.5 mG/mL Suspension 3 milliGRAM(s) Oral <User Schedule>    MEDICATIONS  (PRN):  LORazepam   Injectable 0.5 milliGRAM(s) IV Push every 6 hours PRN anxiety/agitation      Objective:  ICU Vital Signs Last 24 Hrs  T(C): 37.6 (04 Mar 2025 19:00), Max: 37.8 (04 Mar 2025 17:00)  T(F): 99.6 (04 Mar 2025 19:00), Max: 100.1 (04 Mar 2025 17:00)  HR: 150 (04 Mar 2025 22:00) (27 - 150)  BP: 134/61 (04 Mar 2025 21:30) (87/47 - 178/82)  BP(mean): 88 (04 Mar 2025 21:30) (61 - 115)  ABP: 121/57 (04 Mar 2025 22:00) (121/57 - 121/57)  ABP(mean): 74 (04 Mar 2025 22:00) (74 - 74)  RR: 18 (04 Mar 2025 22:00) (10 - 52)  SpO2: 94% (04 Mar 2025 22:00) (51% - 100%)    O2 Parameters below as of 04 Mar 2025 20:00  Patient On (Oxygen Delivery Method): nasal cannula  O2 Flow (L/min): 3               @ 07: @ 07:00  --------------------------------------------------------  IN: 2774.1 mL / OUT: 1349 mL / NET: 1425.1 mL    04 @ 07:01  -  04 @ 22:26  --------------------------------------------------------  IN: 943.9 mL / OUT: 0 mL / NET: 943.9 mL      Daily     Daily Weight in k (04 Mar 2025 01:00)    PHYSICAL EXAM:      Constitutional:    HEENT:    Respiratory:    Cardiovascular:  Access site:    Gastrointestinal:    Genitourinary:    Extremities:    Vascular:    Neurological:    Skin:      TELEMETRY:     EKG:     IMAGING:    Labs:  ABG - ( 04 Mar 2025 22:04 )  pH, Arterial: 7.12  pH, Blood: x     /  pCO2: 66    /  pO2: 319   / HCO3: 22    / Base Excess: -8.8  /  SaO2: 100.0                                   7.8    15.15 )-----------( 259      ( 04 Mar 2025 01:05 )             24.8     03-04    134[L]  |  103  |  20  ----------------------------<  162[H]  3.7   |  21[L]  |  1.87[H]    Ca    7.3[L]      04 Mar 2025 01:05  Phos  3.7     03-04  Mg     2.5     03-04    TPro  4.6[L]  /  Alb  2.3[L]  /  TBili  0.7  /  DBili  x   /  AST  16  /  ALT  11  /  AlkPhos  51  03-04    LIVER FUNCTIONS - ( 04 Mar 2025 01:05 )  Alb: 2.3 g/dL / Pro: 4.6 g/dL / ALK PHOS: 51 U/L / ALT: 11 U/L / AST: 16 U/L / GGT: x           PT/INR - ( 04 Mar 2025 01:05 )   PT: 11.8 sec;   INR: 1.03 ratio         PTT - ( 04 Mar 2025 01:05 )  PTT:71.7 sec    Urinalysis Basic - ( 04 Mar 2025 01:05 )    Color: x / Appearance: x / SG: x / pH: x  Gluc: 162 mg/dL / Ketone: x  / Bili: x / Urobili: x   Blood: x / Protein: x / Nitrite: x   Leuk Esterase: x / RBC: x / WBC x   Sq Epi: x / Non Sq Epi: x / Bacteria: x        HEALTH ISSUES - PROBLEM Dx:  Acute respiratory acidosis    Hyperkalemia    Stage 3 chronic kidney disease    Metabolic encephalopathy    Systemic inflammatory response syndrome (SIRS)    Need for prophylactic measure    H/O heart transplant    AMS (altered mental status)    NORMAN (acute kidney injury)    Status post heart transplant    Immunosuppression    Prophylactic antibiotic    Deep vein thrombosis (DVT)    Hypertension    DM (diabetes mellitus)    Anemia, hemolytic    Swollen arm    Hemolytic anemia    Paroxysmal atrial fibrillation    Diabetes type 2    Acute kidney injury superimposed on CKD    Vascular disorder of lower extremity    Coffee ground emesis    Ileus    Septic shock      ====================ASSESSMENT ==============  73yo M w/ pmhx HTN, HLD, nonischemic cardiomyopathy, chronic systolic heart failure s/p HM2 LVAD (2017), s/p heart transplant from Hep. 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 Found to have septic shock, ( BCx pseudo,  BCx NGTD). Still episodes of hypotension. Worsening NORMAN. Accepted to MICU for CRRT.    Plan:  ====================== NEUROLOGY=====================  # Metabolic encephalopathy  - Likely multi-factorial etiologies: septic vs uremic vs hypercapnic, less likely meningitis  - Patient baseline AOx3. AOx1 at ED arrival, iso of infection vs metabolic derangements  - CTH and angio w/o hemorrhage or stenosis   - Fall precautions  - spot EEG : negative for seizures   - Currently A&Ox0-1  - c/w precedex gtt for agitation  - Neuro following  - Monitor mental status per protocol  - Started on zyprexa PRN agitation 3/3    ==================== RESPIRATORY======================  - No active issues  - SpO2 WNL on room air    ====================CARDIOVASCULAR==================  # Septic shock from pseudomonas bacteremia  - POCUS reveals no evidence of  fluid overload  - s/p fluid resuscitation  - s/p levo, off   - Lactate WNL    # h/o heart transplant  - Nonischemic cardiomyopathy, chronic systolic heart failure s/p HM2 LVAD (2017), s/p heart transplant from Hep. C donor (treated) 18   - c/w tacro w/ daily levels  - Intolerant of Cellcept due to leukopenia  - c/w solucortef  - c/w Atovaquone for ppx  - c/w nystatin for ppx  - c/w ASA    # Paroxysmal atrial fibrillation.   - Home regimen : Eliquis 5 mg PO BID at home for hx of of afib and IJ thrombi  - EKG on admission - NSR  - c/w lopressor 25 BID  - c/w heparin gtt    # Hypertension (chronic)  - Holding home Losartan 75 mg PO QD iso shock    # Vascular disorder of lower extremity   - Extremities appear cool and dry   - c/w local wound care by podiatry   - Podiatry recommendations - pending SHANEKA studies, consider vascular evaluation if abnormal    ===================HEMATOLOGIC/ONC ===================  # Hx of hemolytic anemia, Nicole Syndrome  - Follows with Dr. Rosario as outpatient  - Prednisone 15 mg PO QD --> switched to Solu-cortef 75mg IV q8hr > back to prednisone 3/3  - f/u hematology/oncology recs  - Monitor H&H daily  - f/u hemolysis labs and peripheral smear    # DVT ppx  - Heparin gtt    ===================== RENAL =========================  # NORMAN on CKD  # ATN 2/2 septic shock  - Scr ranged from 1.3-2.1 in . Most recent Scr was 1.75 on 24. On admission, Scr was 1.9 >>> 6  - No improvement with IVF, likely ATN i/s/o contrast/medications  - CRRT stopped 3/3 8pm, plan for iHD  - Nephro following    # Hyperkalemia (resolved)   - K 6.2 at ED arrival  - Shifted in the ED, no EKG changes consistent with severe hyper K. Cr not significantly elevated form baseline, c/f RTA from bactrim and Tacro. Stable   - s/p Lokelma 10mg BID for 3 days  - CRRT as above    ==================== GASTROINTESTINAL===================  # Ileus, resolved  #+/- coffee ground emesis, resolved  - 1 episode of coffee ground emesis, unclear if true GIB  - CTAP () ileus vs partial SBO - cannot r/o GI bleed  - c/w PPI IV 40 BID   - c/w trickle feeds > TFs advanced 3/4  - Having bowel movements    =======================    ENDOCRINE  =====================  # DMT2  - A1c 5.8  - ISS    ========================INFECTIOUS DISEASE================  # Septic shock   # Hypothermia  # Bacteremia  - Presented with T 91.4F, WBC 21 concerning for possible occult infection   - U/A without LE or Nitrites, CXR without clear consolidation, MRSA negative Lower concern for meningitis at this moment  - 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 No manifest signs of bowel ischemia. Interval worsening of right lower lobe and right middle lobe atelectasis secondary to elevated right hemidiaphragm when compared to prior CT dated 2023. Superimposed infection in the collapsed lungs is not excluded  - s/p meropenem (-)  - Hold off additional doses of Vancomycin (MRSA swab negative)  - c/w ganciclovir  - c/w meropenem  - c/w vanco  - c/w atovaquone and nystatin for ppx  - f/u infectious labs - Toxoplasma, EBV, Fungitell, Galactomannan, CMV, Cryptococcus, MRSA, ASCENCION virus, CMV, Crypto, ASCENCION virus  - f/u GI PCR, consider C. Diff testing   - Transplant ID recommendations   - Consider LP if MS not improving    Patient requires continuous monitoring with bedside rhythm monitoring, pulse ox monitoring, and intermittent blood gas analysis. Care plan discussed with ICU care team. Patient remained critical and at risk for life threatening decompensation.  Patient seen, examined and plan discussed with CCU team during rounds.     I have personally provided 35 minutes of critical care time excluding time spent on separate procedures, in addition to initial critical care time provided by the CICU Attending, Dr. Leigh.      Admission date:  CHIEF COMPLAINT:  HPI:  75yo M pmhx HTN, HLD, nonischemic cardiomyopathy, chronic systolic heart failure s/p HM2 LVAD (2017), s/p heart transplant from Hep. 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 On the phone, the patient's godbrother mentioned that on , the patient was in the car with his godbrother and was disoriented asking to get off on the road 4 blocks before his house. In addition, a caretaker stated that when she spoke to Mr. Hameed on the phone at 11am on , he was doing well. However, when the caretaker visited 2 hours later at 1pm, the patient was disoriented and felt his legs were heavy. This prompted the patient to come to the hospital.     In the ED: Hypothermic 91.4, HR 80, /60, RA   Patient noted to be Hyperkalemic (6.2) with Mixed respiratory and metabolic acidosis pH 7.2. - Patient was given Calcium Gluconate 2g, D50/Insulin 5 unit, 40 mg IV lasix, Lokelma 10mg.   Vancomycin and Zosyn  (2025 07:21)    INTERVAL HISTORY: Pt became junctional maggi, rates dropped to 30s then PEA.  Code Blue called, ACLS protocol initiated, pt was also intubated during Code blue.  Familly made aware.  ROSC after 30 minutes.     REVIEW OF SYSTEMS: AMS     MEDICATIONS  (STANDING):  atorvastatin 10 milliGRAM(s) Oral at bedtime  atovaquone  Suspension 1500 milliGRAM(s) Oral daily  chlorhexidine 0.12% Liquid 15 milliLiter(s) Oral Mucosa every 12 hours  chlorhexidine 2% Cloths 1 Application(s) Topical daily  cholecalciferol 1000 Unit(s) Oral daily  heparin  Infusion 1000 Unit(s)/Hr (8 mL/Hr) IV Continuous <Continuous>  insulin lispro (ADMELOG) corrective regimen sliding scale   SubCutaneous every 6 hours  metoprolol tartrate 25 milliGRAM(s) Enteral Tube once  norepinephrine Infusion 0.7 MICROgram(s)/kG/Min (119 mL/Hr) IV Continuous <Continuous>  nystatin    Suspension 593390 Unit(s) Oral four times a day  pantoprazole  Injectable 40 milliGRAM(s) IV Push two times a day  polyethylene glycol 3350 17 Gram(s) Oral daily  predniSONE   Tablet 15 milliGRAM(s) Oral daily  QUEtiapine 25 milliGRAM(s) Oral every 6 hours  senna 2 Tablet(s) Oral daily  sodium bicarbonate  Injectable 50 milliEquivalent(s) IV Push every 5 minutes  tacrolimus    0.5 mG/mL Suspension 3 milliGRAM(s) Oral <User Schedule>    MEDICATIONS  (PRN):  LORazepam   Injectable 0.5 milliGRAM(s) IV Push every 6 hours PRN anxiety/agitation      Objective:  ICU Vital Signs Last 24 Hrs  T(C): 37.6 (04 Mar 2025 19:00), Max: 37.8 (04 Mar 2025 17:00)  T(F): 99.6 (04 Mar 2025 19:00), Max: 100.1 (04 Mar 2025 17:00)  HR: 150 (04 Mar 2025 22:00) (27 - 150)  BP: 134/61 (04 Mar 2025 21:30) (87/47 - 178/82)  BP(mean): 88 (04 Mar 2025 21:30) (61 - 115)  ABP: 121/57 (04 Mar 2025 22:00) (121/57 - 121/57)  ABP(mean): 74 (04 Mar 2025 22:00) (74 - 74)  RR: 18 (04 Mar 2025 22:00) (10 - 52)  SpO2: 94% (04 Mar 2025 22:00) (51% - 100%)    O2 Parameters below as of 04 Mar 2025 20:00  Patient On (Oxygen Delivery Method): nasal cannula  O2 Flow (L/min): 3              03 @ 07:  -   @ 07:00  --------------------------------------------------------  IN: 2774.1 mL / OUT: 1349 mL / NET: 1425.1 mL     @ 07:01  -   @ 22:26  --------------------------------------------------------  IN: 943.9 mL / OUT: 0 mL / NET: 943.9 mL      Daily     Daily Weight in k (04 Mar 2025 01:00)    PHYSICAL EXAMINATION:  Constitutional: laying in bed, arousable  HEENT: NC/AT, PERRLA, EOMI, no conjunctival pallor or scleral icterus, MMM  Neck: Supple, no JVD  Respiratory: CTA B/L. No w/r/r.   Cardiovascular: regular rate, normal S1 and S2, no m/r/g.   Gastrointestinal: mildly distended, +BS, nontender, no guarding or rebound tenderness, no palpable masses  Extremities: RUE edematous; no cyanosis, clubbing.  Neurological: AAOx0  Skin: approx. 3x3cm area of hyperpigmentation and swelling to R parasternal upper chest wall, no fluctuance no open wounds.    TELEMETRY:     EKG:     IMAGING:    Labs:  ABG - ( 04 Mar 2025 22:04 )  pH, Arterial: 7.12  pH, Blood: x     /  pCO2: 66    /  pO2: 319   / HCO3: 22    / Base Excess: -8.8  /  SaO2: 100.0                                   7.8    15.15 )-----------( 259      ( 04 Mar 2025 01:05 )             24.8     03-04    134[L]  |  103  |  20  ----------------------------<  162[H]  3.7   |  21[L]  |  1.87[H]    Ca    7.3[L]      04 Mar 2025 01:05  Phos  3.7     03-04  Mg     2.5     03-04    TPro  4.6[L]  /  Alb  2.3[L]  /  TBili  0.7  /  DBili  x   /  AST  16  /  ALT  11  /  AlkPhos  51  03-04    LIVER FUNCTIONS - ( 04 Mar 2025 01:05 )  Alb: 2.3 g/dL / Pro: 4.6 g/dL / ALK PHOS: 51 U/L / ALT: 11 U/L / AST: 16 U/L / GGT: x           PT/INR - ( 04 Mar 2025 01:05 )   PT: 11.8 sec;   INR: 1.03 ratio         PTT - ( 04 Mar 2025 01:05 )  PTT:71.7 sec    Urinalysis Basic - ( 04 Mar 2025 01:05 )    Color: x / Appearance: x / SG: x / pH: x  Gluc: 162 mg/dL / Ketone: x  / Bili: x / Urobili: x   Blood: x / Protein: x / Nitrite: x   Leuk Esterase: x / RBC: x / WBC x   Sq Epi: x / Non Sq Epi: x / Bacteria: x        HEALTH ISSUES - PROBLEM Dx:  Acute respiratory acidosis    Hyperkalemia    Stage 3 chronic kidney disease    Metabolic encephalopathy    Systemic inflammatory response syndrome (SIRS)    Need for prophylactic measure    H/O heart transplant    AMS (altered mental status)    NORMAN (acute kidney injury)    Status post heart transplant    Immunosuppression    Prophylactic antibiotic    Deep vein thrombosis (DVT)    Hypertension    DM (diabetes mellitus)    Anemia, hemolytic    Swollen arm    Hemolytic anemia    Paroxysmal atrial fibrillation    Diabetes type 2    Acute kidney injury superimposed on CKD    Vascular disorder of lower extremity    Coffee ground emesis    Ileus    Septic shock      ====================ASSESSMENT ==============  75yo M w/ pmhx HTN, HLD, nonischemic cardiomyopathy, chronic systolic heart failure s/p HM2 LVAD (2017), s/p heart transplant from Hep. 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 Found to have septic shock, ( BCx pseudo,  BCx NGTD). Still episodes of hypotension. Worsening NORMAN. Accepted to MICU for CRRT.    Plan:  ====================== NEUROLOGY=====================  # Metabolic encephalopathy  - Likely multi-factorial etiologies: septic vs uremic vs hypercapnic, less likely meningitis  - Patient baseline AOx3. AOx1 at ED arrival, iso of infection vs metabolic derangements  - CTH and angio w/o hemorrhage or stenosis   - Fall precautions  - spot EEG : negative for seizures   - Currently A&Ox0-1  - off precedex since AM since  - Neuro following  - Monitor mental status per protocol  - Started on ativan PRN agitation 3/3, also seroquel q6hrs     ==================== RESPIRATORY======================  - No active issues  - SpO2 WNL on room air    ====================CARDIOVASCULAR==================  # Septic shock from pseudomonas bacteremia  - POCUS reveals no evidence of  fluid overload  - s/p fluid resuscitation  - s/p levo, off   - Lactate WNL    # h/o heart transplant  - Nonischemic cardiomyopathy, chronic systolic heart failure s/p HM2 LVAD (2017), s/p heart transplant from Hep. C donor (treated) 18   - c/w tacro w/ daily levels  - Intolerant of Cellcept due to leukopenia  - c/w solucortef  - c/w Atovaquone for ppx  - c/w nystatin for ppx  - c/w ASA    # Paroxysmal atrial fibrillation.   - Home regimen : Eliquis 5 mg PO BID at home for hx of of afib and IJ thrombi  - EKG on admission - NSR  - c/w lopressor 25 BID  - c/w heparin gtt    # Hypertension (chronic)  - Holding home Losartan 75 mg PO QD iso shock    # Vascular disorder of lower extremity   - Extremities appear cool and dry   - c/w local wound care by podiatry   - Podiatry recommendations - pending SHANEKA studies, consider vascular evaluation if abnormal    ===================HEMATOLOGIC/ONC ===================  # Hx of hemolytic anemia, Nicole Syndrome  - Follows with Dr. Rosario as outpatient  - Prednisone 15 mg PO QD --> switched to Solu-cortef 75mg IV q8hr > back to prednisone 3/3  - f/u hematology/oncology recs  - Monitor H&H daily  - f/u hemolysis labs and peripheral smear    # DVT ppx  - Heparin gtt    ===================== RENAL =========================  # NORMAN on CKD  # ATN 2/2 septic shock  - Scr ranged from 1.3-2.1 in . Most recent Scr was 1.75 on 24. On admission, Scr was 1.9 >>> 6  - No improvement with IVF, likely ATN i/s/o contrast/medications  - CRRT stopped 3/3 8pm, plan for iHD  - Nephro following    # Hyperkalemia (resolved)   - K 6.2 at ED arrival  - Shifted in the ED, no EKG changes consistent with severe hyper K. Cr not significantly elevated form baseline, c/f RTA from bactrim and Tacro. Stable   - s/p Lokelma 10mg BID for 3 days  - CRRT as above    ==================== GASTROINTESTINAL===================  # Ileus, resolved  #+/- coffee ground emesis, resolved  - 1 episode of coffee ground emesis, unclear if true GIB  - CTAP () ileus vs partial SBO - cannot r/o GI bleed  - c/w PPI IV 40 BID   - c/w trickle feeds > TFs advanced 3/4  - Having bowel movements    =======================    ENDOCRINE  =====================  # DMT2  - A1c 5.8  - ISS    ========================INFECTIOUS DISEASE================  # Septic shock   # Hypothermia  # Bacteremia  - Presented with T 91.4F, WBC 21 concerning for possible occult infection   - U/A without LE or Nitrites, CXR without clear consolidation, MRSA negative Lower concern for meningitis at this moment  - 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 No manifest signs of bowel ischemia. Interval worsening of right lower lobe and right middle lobe atelectasis secondary to elevated right hemidiaphragm when compared to prior CT dated 2023. Superimposed infection in the collapsed lungs is not excluded  - s/p meropenem (-)  - Hold off additional doses of Vancomycin (MRSA swab negative)  - c/w ganciclovir  - c/w meropenem  - c/w vanco  - c/w atovaquone and nystatin for ppx  - f/u infectious labs - Toxoplasma, EBV, Fungitell, Galactomannan, CMV, Cryptococcus, MRSA, ASCENCION virus, CMV, Crypto, ASCENCION virus  - f/u GI PCR, consider C. Diff testing   - Transplant ID recommendations   - Consider LP if MS not improving    Patient requires continuous monitoring with bedside rhythm monitoring, pulse ox monitoring, and intermittent blood gas analysis. Care plan discussed with ICU care team. Patient remained critical and at risk for life threatening decompensation.  Patient seen, examined and plan discussed with CCU team during rounds.     I have personally provided 35 minutes of critical care time excluding time spent on separate procedures, in addition to initial critical care time provided by the CICU Attending, Dr. Leigh.

## 2025-03-04 NOTE — PROGRESS NOTE ADULT - SUBJECTIVE AND OBJECTIVE BOX
[facility] Division of Kidney Diseases & Hypertension  FOLLOW UP NOTE  [phone]--------------------------------------------------------------------------------  Chief Complaint: NORMAN on CKD, now on CRRT    24 hour events/subjective: Pt seen and evaluated this morning in the ICU. Pt lethargic and unable to provide ROS. Per RN, CRRT has been off since 8pm on 3/3, no notable UOP.     PAST HISTORY  --------------------------------------------------------------------------------  No significant changes to PMH, PSH, FHx, SHx, unless otherwise noted    ALLERGIES & MEDICATIONS  --------------------------------------------------------------------------------  Allergies    No Known Allergies    Intolerances    Standing Inpatient Medications  aspirin  chewable 81 milliGRAM(s) Oral daily  atorvastatin 10 milliGRAM(s) Oral at bedtime  atovaquone  Suspension 1500 milliGRAM(s) Oral daily  chlorhexidine 2% Cloths 1 Application(s) Topical daily  cholecalciferol 1000 Unit(s) Oral daily  CRRT Treatment    <Continuous>  dexMEDEtomidine Infusion 0.5 MICROgram(s)/kG/Hr IV Continuous <Continuous>  ganciclovir IVPB 230 milliGRAM(s) IV Intermittent every 24 hours  heparin  Infusion 1000 Unit(s)/Hr IV Continuous <Continuous>  insulin lispro (ADMELOG) corrective regimen sliding scale   SubCutaneous every 6 hours  meropenem  IVPB 1000 milliGRAM(s) IV Intermittent every 8 hours  metoprolol tartrate 25 milliGRAM(s) Oral two times a day  nystatin    Suspension [id number] Unit(s) Oral four times a day  pantoprazole  Injectable 40 milliGRAM(s) IV Push two times a day  Phoxillum Filtration BK 4 / 2.5 5000 milliLiter(s) CRRT <Continuous>  Phoxillum Filtration BK 4 / 2.5 5000 milliLiter(s) CRRT <Continuous>  Phoxillum Filtration BK 4 / 2.5 5000 milliLiter(s) CRRT <Continuous>  polyethylene glycol 3350 17 Gram(s) Oral daily  predniSONE   Tablet 15 milliGRAM(s) Oral daily  senna 2 Tablet(s) Oral daily  tacrolimus    0.5 mG/mL Suspension 3 milliGRAM(s) Oral <User Schedule>  vancomycin  IVPB 750 milliGRAM(s) IV Intermittent every 12 hours    PRN Inpatient Medications  OLANZapine Injectable 5 milliGRAM(s) IntraMuscular every 12 hours PRN    REVIEW OF SYSTEMS  --------------------------------------------------------------------------------  see above    VITALS/PHYSICAL EXAM  --------------------------------------------------------------------------------  T(C): 35.3 (03-04-25 @ 07:00), Max: 37.3 (03-03-25 @ 14:00)  HR: 77 (03-04-25 @ 08:00) (72 - 94)  BP: 104/51 (03-04-25 @ 08:00) (98/49 - 158/66)  RR: 16 (03-04-25 @ 08:00) (10 - 31)  SpO2: 99% (03-04-25 @ 08:00) (93% - 100%)  Wt(kg): --    03-03-25 @ 07:01  -  03-04-25 @ 07:00  --------------------------------------------------------  IN: 2774.1 mL / OUT: 1349 mL / NET: 1425.1 mL    03-04-25 @ 07:01  -  03-04-25 @ 08:15  --------------------------------------------------------  IN: 145.8 mL / OUT: 0 mL / NET: 145.8 mL    Physical Exam:  Gen: ill-appearing but NAD  HEENT: +NGT   Pulm: CTA B/L  CV: S1S2  Abd: Soft, +BS   Ext: No LE edema B/L  Neuro: Lethargic   Skin: Warm and dry  Dialysis access: [facility]    LABS/STUDIES  --------------------------------------------------------------------------------              7.8    15.15 >-----------<  259      [03-04-25 @ 01:05]              24.8     134  |  103  |  20  ----------------------------<  162      [03-04-25 @ 01:05]  3.7   |  21  |  1.87        Ca     7.3     [03-04-25 @ 01:05]      Mg     2.5     [03-04-25 @ 01:05]      Phos  3.7     [03-04-25 @ 01:05]    TPro  4.6  /  Alb  2.3  /  TBili  0.7  /  DBili  x   /  AST  16  /  ALT  11  /  AlkPhos  51  [03-04-25 @ 01:05]    PT/INR: PT 11.8 , INR 1.03       [03-04-25 @ 01:05]  PTT: 71.7       [03-04-25 @ 01:05]          [03-02-25 @ 13:01]    Creatinine Trend:  SCr 1.87 [03-04 @ 01:05]  SCr 1.40 [03-03 @ 01:12]  SCr 1.40 [03-02 @ 17:11]  SCr 1.43 [03-02 @ 13:01]  SCr 1.43 [03-02 @ 05:55]

## 2025-03-04 NOTE — PROGRESS NOTE ADULT - ASSESSMENT
====================ASSESSMENT ==============  73yo M w/ pmhx HTN, HLD, nonischemic cardiomyopathy, chronic systolic heart failure s/p HM2 LVAD (6/2017), s/p heart transplant from Hep. 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 Found to have septic shock, (2/19 BCx pseudo, 2/22 BCx NGTD). Still episodes of hypotension. Worsening NORMAN. Accepted to MICU for CRRT.    Plan:  ====================== NEUROLOGY=====================  # Metabolic encephalopathy  - Likely multi-factorial etiologies: septic vs uremic vs hypercapnic, less likely meningitis  - Patient baseline AOx3. AOx1 at ED arrival, iso of infection vs metabolic derangements  - CTH and angio w/o hemorrhage or stenosis   - Fall precautions  - spot EEG 2/21: negative for seizures   - Currently A&Ox0-1  - c/w precedex gtt for agitation  - Neuro following  - Monitor mental status per protocol  - Started on zyprexa PRN agitation 3/3    ==================== RESPIRATORY======================  - No active issues  - SpO2 WNL on room air    ====================CARDIOVASCULAR==================  # Septic shock from pseudomonas bacteremia  - POCUS reveals no evidence of  fluid overload  - s/p fluid resuscitation  - s/p levo, off 2/24  - Lactate WNL    # h/o heart transplant  - Nonischemic cardiomyopathy, chronic systolic heart failure s/p HM2 LVAD (6/2017), s/p heart transplant from Hep. C donor (treated) 2/23/18   - c/w tacro w/ daily levels  - Intolerant of Cellcept due to leukopenia  - c/w solucortef  - c/w Atovaquone for ppx  - c/w nystatin for ppx  - c/w ASA    # Paroxysmal atrial fibrillation.   - Home regimen : Eliquis 5 mg PO BID at home for hx of of afib and IJ thrombi  - EKG on admission - NSR  - c/w lopressor 25 BID  - c/w heparin gtt    # Hypertension (chronic)  - Holding home Losartan 75 mg PO QD iso shock    # Vascular disorder of lower extremity   - Extremities appear cool and dry   - c/w local wound care by podiatry   - Podiatry recommendations - pending SHANEKA studies, consider vascular evaluation if abnormal    ===================HEMATOLOGIC/ONC ===================  # Hx of hemolytic anemia, Sanchez Syndrome  - Follows with Dr. Rosario as outpatient  - Prednisone 15 mg PO QD --> switched to Solu-cortef 75mg IV q8hr > back to prednisone 3/3  - f/u hematology/oncology recs  - Monitor H&H daily  - f/u hemolysis labs and peripheral smear    # DVT ppx  - Heparin gtt    ===================== RENAL =========================  # NORMAN on CKD  # ATN 2/2 septic shock  - Scr ranged from 1.3-2.1 in 2023. Most recent Scr was 1.75 on 1/30/24. On admission, Scr was 1.9 >>> 6  - No improvement with IVF, likely ATN i/s/o contrast/medications  - CRRT stopped 3/3 8pm, plan for iHD  - Nephro following    # Hyperkalemia (resolved)   - K 6.2 at ED arrival  - Shifted in the ED, no EKG changes consistent with severe hyper K. Cr not significantly elevated form baseline, c/f RTA from bactrim and Tacro. Stable   - s/p Lokelma 10mg BID for 3 days  - CRRT as above    ==================== GASTROINTESTINAL===================  # Ileus, resolved  #+/- coffee ground emesis, resolved  - 1 episode of coffee ground emesis, unclear if true GIB  - CTAP (2/22) ileus vs partial SBO - cannot r/o GI bleed  - c/w PPI IV 40 BID   - c/w trickle feeds > TFs advanced 3/4  - Having bowel movements    =======================    ENDOCRINE  =====================  # DMT2  - A1c 5.8  - ISS    ========================INFECTIOUS DISEASE================  # Septic shock   # Hypothermia  # Bacteremia  - Presented with T 91.4F, WBC 21 concerning for possible occult infection   - U/A without LE or Nitrites, CXR without clear consolidation, MRSA negative Lower concern for meningitis at this moment  - 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 No manifest signs of bowel ischemia. Interval worsening of right lower lobe and right middle lobe atelectasis secondary to elevated right hemidiaphragm when compared to prior CT dated 7/11/2023. Superimposed infection in the collapsed lungs is not excluded  - s/p meropenem (2/21-2/24)  - Hold off additional doses of Vancomycin (MRSA swab negative)  - c/w ganciclovir  - c/w meropenem  - c/w vanco  - c/w atovaquone and nystatin for ppx  - f/u infectious labs - Toxoplasma, EBV, Fungitell, Galactomannan, CMV, Cryptococcus, MRSA, ASCENCION virus, CMV, Crypto, ASCENCION virus  - f/u GI PCR, consider C. Diff testing   - Transplant ID recommendations   - Consider LP if MS not improving    Patient requires continuous monitoring with bedside rhythm monitoring, pulse ox monitoring, and intermittent blood gas analysis. Care plan discussed with ICU care team. Patient remained critical and at risk for life threatening decompensation.  Patient seen, examined and plan discussed with CCU team during rounds.    ====================ASSESSMENT ==============  75yo M w/ pmhx HTN, HLD, nonischemic cardiomyopathy, chronic systolic heart failure s/p HM2 LVAD (6/2017), s/p heart transplant from Hep. 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 Found to have septic shock, (2/19 BCx pseudo, 2/22 BCx NGTD). Still episodes of hypotension. Worsening NORMAN. Accepted to MICU for CRRT.    Plan:  ====================== NEUROLOGY=====================  # Metabolic encephalopathy  - Likely multi-factorial etiologies: septic vs uremic vs hypercapnic, less likely meningitis  - Patient baseline AOx3. AOx1 at ED arrival, iso of infection vs metabolic derangements  - CTH and angio w/o hemorrhage or stenosis   - Fall precautions  - spot EEG 2/21: negative for seizures   - Currently A&Ox0-1  - Neuro following  - Monitor mental status per protocol  - c/w precedex gtt 1.5 for agitation, wean as tolerated  - start seroquel 25mg PO q6hr per psych recs, monitor QTc  - PRN ativan 0.5mg PO/IV    ==================== RESPIRATORY======================  - No active issues  - SpO2 WNL on room air    ====================CARDIOVASCULAR==================  # Septic shock from pseudomonas bacteremia  - POCUS reveals no evidence of  fluid overload  - s/p fluid resuscitation  - s/p levo, off 2/24  - Lactate WNL    # h/o heart transplant  - Nonischemic cardiomyopathy, chronic systolic heart failure s/p HM2 LVAD (6/2017), s/p heart transplant from Hep. C donor (treated) 2/23/18   - c/w tacro w/ daily levels  - Intolerant of Cellcept due to leukopenia  - c/w home prednisone 15mg daily  - c/w Atovaquone for ppx  - c/w nystatin for ppx  - hold ASA (for possible LP this week)    # Paroxysmal atrial fibrillation.   - Home regimen : Eliquis 5 mg PO BID at home for hx of of afib and IJ thrombi  - EKG on admission - NSR  - c/w lopressor 25 BID  - c/w heparin gtt    # Hypertension (chronic)  - Holding home Losartan 75 mg PO QD iso shock    # Vascular disorder of lower extremity   - Extremities appear cool and dry   - c/w local wound care by podiatry     ===================HEMATOLOGIC/ONC ===================  # Hx of hemolytic anemia, Sanchez Syndrome  - Follows with Dr. Rosario as outpatient  - Prednisone 15 mg PO QD --> switched to Solu-cortef 75mg IV q8hr (2/26-3/3) > back to prednisone 15mg (3/3)  - f/u hematology/oncology recs  - Monitor H&H daily  - f/u hemolysis labs and peripheral smear    # DVT ppx  - Heparin gtt for Afib    ===================== RENAL =========================  # NORMAN on CKD  # ATN 2/2 septic shock  - Scr ranged from 1.3-2.1 in 2023. Most recent Scr was 1.75 on 1/30/24. On admission, Scr was 1.9 >>> 6  - No improvement with IVF, likely ATN i/s/o contrast/medications  - CRRT stopped 3/3 8pm, plan for iHD  - trial bumex 4mg IV x1 (3/4)  - Nephro following    # Hyperkalemia (resolved)   - K 6.2 at ED arrival  - Shifted in the ED, no EKG changes consistent with severe hyper K. Cr not significantly elevated form baseline, c/f RTA from bactrim and Tacro. Stable   - s/p Lokelma 10mg BID for 3 days  - dialysis as above    ==================== GASTROINTESTINAL===================  # Ileus, resolved  #+/- coffee ground emesis, resolved  - 1 episode of coffee ground emesis, unclear if true GIB  - CTAP (2/22) ileus vs partial SBO - cannot r/o GI bleed  - c/w PPI IV 40 BID   - c/w trickle feeds > TFs advanced 3/4  - Having bowel movements    =======================    ENDOCRINE  =====================  # DMT2  - A1c 5.8  - ISS    ========================INFECTIOUS DISEASE================  # Septic shock   # Hypothermia  # bacteremia  - Presented with T 91.4F, WBC 21 concerning for possible occult infection,   - U/A without LE or Nitrites, CXR without clear consolidation, MRSA negative Lower concern for meningitis at this moment  - 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 No manifest signs of bowel ischemia. Interval worsening of right lower lobe and right middle lobe atelectasis secondary to elevated right hemidiaphragm when compared to prior CT dated 7/11/2023. Superimposed infection in the collapsed lungs is not excluded  - meropenem (2/21-2/24), restarted meropenem for CNS coverage (2/28 - )  - vancomycin for CNS coverage (2/28 - )  - s/p zosyn (2/24 - 2/28)  - MRSA swab negative  - hold home Valganciclovir for ppx --> switch to ganciclovir ppx  - f/u infectious labs - Toxoplasma, EBV, Fungitell, Galactomannan, CMV, Cryptococcus, MRSA, ASCENCION virus, CMV, Crypto, ASCENCION virus  - f/u GI PCR, consider C. Diff testing   - Transplant ID recommendations   - Consider LP if MS not improving - holding ASA for possible LP this week  - Warm blanket      Patient requires continuous monitoring with bedside rhythm monitoring, pulse ox monitoring, and intermittent blood gas analysis. Care plan discussed with ICU care team. Patient remained critical and at risk for life threatening decompensation.  Patient seen, examined and plan discussed with CCU team during rounds.

## 2025-03-04 NOTE — PROGRESS NOTE ADULT - PROBLEM SELECTOR PLAN 5
- hx of hemolytic anemia, follows with Dr. Rosario as outpatient  - cont prednisone 15mg daily  - 1u pRBC transfused overnight 3/1

## 2025-03-04 NOTE — PROGRESS NOTE ADULT - PROBLEM SELECTOR PLAN 3
INR ADDRESSED BY KATIE COLINDRES WITH TIERA GARCIA, PAC. PH,LPN   - was readmitted with worsening mental status, currently A&Ox1  - EEG unremarkable  - CT Angio head 2/19 was unremarkable how limited diagnostic study secondary to suboptimal opacification of the   intracranial arterial vasculature  - CT A/P unremarkable  - MRI chest: Metallic artifact, no evidence OM. Edema right pectroalis with surrounding soft tissue edema = cellulitis vs. myositis. No abscess seen  - ID recommending LP due to concern for ASCENCION virus

## 2025-03-04 NOTE — PROGRESS NOTE ADULT - ATTENDING COMMENTS
juan f-atn on cvvhdf   stopped 3/3   dialysis in am    jyoti abraham  nephrology attending   please contact me on TEAMS   Office- [phone]

## 2025-03-05 NOTE — PROGRESS NOTE ADULT - SUBJECTIVE AND OBJECTIVE BOX
Admission date:  CHIEF COMPLAINT:  HPI:  75yo M pmhx HTN, HLD, nonischemic cardiomyopathy, chronic systolic heart failure s/p HM2 LVAD (2017), s/p heart transplant from Hep. 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 On the phone, the patient's godbrother mentioned that on , the patient was in the car with his godbrother and was disoriented asking to get off on the road 4 blocks before his house. In addition, a caretaker stated that when she spoke to Mr. Hameed on the phone at 11am on , he was doing well. However, when the caretaker visited 2 hours later at 1pm, the patient was disoriented and felt his legs were heavy. This prompted the patient to come to the hospital.     In the ED: Hypothermic 91.4, HR 80, /60, RA   Patient noted to be Hyperkalemic (6.2) with Mixed respiratory and metabolic acidosis pH 7.2. - Patient was given Calcium Gluconate 2g, D50/Insulin 5 unit, 40 mg IV lasix, Lokelma 10mg.   Vancomycin and Zosyn  (2025 07:21)    INTERVAL HISTORY:    REVIEW OF SYSTEMS: Unresponsive    MEDICATIONS  (STANDING):  atovaquone  Suspension 1500 milliGRAM(s) Oral daily  chlorhexidine 0.12% Liquid 15 milliLiter(s) Oral Mucosa every 12 hours  chlorhexidine 2% Cloths 1 Application(s) Topical daily  cholecalciferol 1000 Unit(s) Oral daily  ganciclovir IVPB 115 milliGRAM(s) IV Intermittent <User Schedule>  insulin lispro (ADMELOG) corrective regimen sliding scale   SubCutaneous every 6 hours  meropenem  IVPB 1000 milliGRAM(s) IV Intermittent every 24 hours  norepinephrine Infusion 0.7 MICROgram(s)/kG/Min (119 mL/Hr) IV Continuous <Continuous>  nystatin    Suspension 701334 Unit(s) Oral four times a day  pantoprazole  Injectable 40 milliGRAM(s) IV Push two times a day  polyethylene glycol 3350 17 Gram(s) Oral daily  predniSONE   Tablet 15 milliGRAM(s) Oral daily  senna 2 Tablet(s) Oral daily  sodium bicarbonate 650 milliGRAM(s) Oral every 8 hours  tacrolimus    0.5 mG/mL Suspension 3 milliGRAM(s) Oral <User Schedule>    MEDICATIONS  (PRN):      Objective:  ICU Vital Signs Last 24 Hrs  T(C): 38.1 (05 Mar 2025 17:45), Max: 38.1 (05 Mar 2025 14:30)  T(F): 100.6 (05 Mar 2025 17:45), Max: 100.6 (05 Mar 2025 14:30)  HR: 160 (05 Mar 2025 22:30) (144 - 175)  BP: --  BP(mean): --  ABP: 94/33 (05 Mar 2025 22:30) (5/-7 - 146/51)  ABP(mean): 48 (05 Mar 2025 22:30) (-18 - 85)  RR: 22 (05 Mar 2025 22:30) (16 - 26)  SpO2: 99% (05 Mar 2025 22:30) (80% - 100%)    O2 Parameters below as of 05 Mar 2025 22:00  Patient On (Oxygen Delivery Method): ventilator    O2 Concentration (%): 40            03-04 @ 07:  -  05 @ 07:00  --------------------------------------------------------  IN: 2750.9 mL / OUT: 200 mL / NET: 2550.9 mL    05 @ 07:01  -  05 @ 22:41  --------------------------------------------------------  IN: 2046.5 mL / OUT: 0 mL / NET: 2046.5 mL      Daily     Daily Weight in k.4 (05 Mar 2025 17:15)    PHYSICAL EXAMINATION:  Constitutional: laying in bed, arousable  HEENT: NC/AT, PERRLA, EOMI, no conjunctival pallor or scleral icterus, MMM  Neck: Supple, no JVD  Respiratory: CTA B/L. No w/r/r.   Cardiovascular: regular rate, normal S1 and S2, no m/r/g.   Gastrointestinal: mildly distended, +BS, nontender, no guarding or rebound tenderness, no palpable masses  Extremities: RUE edematous; no cyanosis, clubbing.  Neurological: unresponsive  Skin: approx. 3x3cm area of hyperpigmentation and swelling to R parasternal upper chest wall, no fluctuance no open wounds.      TELEMETRY:     EKG:     IMAGING:    Labs:  ABG - ( 05 Mar 2025 14:40 )  pH, Arterial: 7.29  pH, Blood: x     /  pCO2: 37    /  pO2: 129   / HCO3: 18    / Base Excess: -8.1  /  SaO2: 99.2                                    9.8    45.04 )-----------( 386      ( 05 Mar 2025 11:00 )             30.9     03-05    137  |  103  |  47[H]  ----------------------------<  195[H]  4.7   |  16[L]  |  3.95[H]    Ca    7.4[L]      05 Mar 2025 11:00  Phos  5.3     03-05  Mg     2.5     03-05    TPro  4.5[L]  /  Alb  2.2[L]  /  TBili  0.6  /  DBili  x   /  AST  285[H]  /  ALT  108[H]  /  AlkPhos  85  03-05    LIVER FUNCTIONS - ( 05 Mar 2025 11:00 )  Alb: 2.2 g/dL / Pro: 4.5 g/dL / ALK PHOS: 85 U/L / ALT: 108 U/L / AST: 285 U/L / GGT: x           PT/INR - ( 05 Mar 2025 00:51 )   PT: 13.2 sec;   INR: 1.16 ratio         PTT - ( 05 Mar 2025 14:47 )  PTT:80.9 sec    Urinalysis Basic - ( 05 Mar 2025 11:00 )    Color: x / Appearance: x / SG: x / pH: x  Gluc: 195 mg/dL / Ketone: x  / Bili: x / Urobili: x   Blood: x / Protein: x / Nitrite: x   Leuk Esterase: x / RBC: x / WBC x   Sq Epi: x / Non Sq Epi: x / Bacteria: x        HEALTH ISSUES - PROBLEM Dx:  Acute respiratory acidosis    Hyperkalemia    Stage 3 chronic kidney disease    Metabolic encephalopathy    Systemic inflammatory response syndrome (SIRS)    Need for prophylactic measure    H/O heart transplant    AMS (altered mental status)    NORMAN (acute kidney injury)    Status post heart transplant    Immunosuppression    Prophylactic antibiotic    Deep vein thrombosis (DVT)    Hypertension    DM (diabetes mellitus)    Anemia, hemolytic    Swollen arm    Hemolytic anemia    Paroxysmal atrial fibrillation    Diabetes type 2    Acute kidney injury superimposed on CKD    Vascular disorder of lower extremity    Coffee ground emesis    Ileus    Septic shock        ====================ASSESSMENT ==============  75yo M w/ pmhx HTN, HLD, nonischemic cardiomyopathy, chronic systolic heart failure s/p HM2 LVAD (2017), s/p heart transplant from Hep. 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 Found to have septic shock, ( BCx pseudo,  BCx NGTD). Still episodes of hypotension. Worsening NORMAN. Accepted to MICU for CRRT.    Plan:  ====================== NEUROLOGY=====================  # Metabolic encephalopathy  - Likely multi-factorial etiologies: septic vs uremic vs hypercapnic, less likely meningitis  - Patient baseline AOx3. AOx1 at ED arrival, iso of infection vs metabolic derangements  - CTH and angio w/o hemorrhage or stenosis   - Fall precautions  - spot EEG : negative for seizures   - Currently A&Ox0-1  - off precedex since AM since  - Neuro following  - Monitor mental status per protocol  - Started on ativan PRN agitation 3/3, also seroquel q6hrs     ==================== RESPIRATORY======================  - No active issues  - SpO2 WNL on room air    ====================CARDIOVASCULAR==================  # Septic shock from pseudomonas bacteremia  - POCUS reveals no evidence of  fluid overload  - s/p fluid resuscitation  - s/p levo, off   - Lactate WNL    # h/o heart transplant  - Nonischemic cardiomyopathy, chronic systolic heart failure s/p HM2 LVAD (2017), s/p heart transplant from Hep. C donor (treated) 18   - c/w tacro w/ daily levels  - Intolerant of Cellcept due to leukopenia  - c/w solucortef  - c/w Atovaquone for ppx  - c/w nystatin for ppx  - c/w ASA    # Paroxysmal atrial fibrillation.   - Home regimen : Eliquis 5 mg PO BID at home for hx of of afib and IJ thrombi  - EKG on admission - NSR  - c/w lopressor 25 BID  - c/w heparin gtt    # Hypertension (chronic)  - Holding home Losartan 75 mg PO QD iso shock    # Vascular disorder of lower extremity   - Extremities appear cool and dry   - c/w local wound care by podiatry   - Podiatry recommendations - pending SHANEKA studies, consider vascular evaluation if abnormal    ===================HEMATOLOGIC/ONC ===================  # Hx of hemolytic anemia, Nicole Syndrome  - Follows with Dr. Rosario as outpatient  - Prednisone 15 mg PO QD --> switched to Solu-cortef 75mg IV q8hr > back to prednisone 3/3  - f/u hematology/oncology recs  - Monitor H&H daily  - f/u hemolysis labs and peripheral smear    # DVT ppx  - Heparin gtt    ===================== RENAL =========================  # NORMAN on CKD  # ATN 2/2 septic shock  - Scr ranged from 1.3-2.1 in . Most recent Scr was 1.75 on 24. On admission, Scr was 1.9 >>> 6  - No improvement with IVF, likely ATN i/s/o contrast/medications  - CRRT stopped 3/3 8pm, plan for iHD  - Nephro following    # Hyperkalemia (resolved)   - K 6.2 at ED arrival  - Shifted in the ED, no EKG changes consistent with severe hyper K. Cr not significantly elevated form baseline, c/f RTA from bactrim and Tacro. Stable   - s/p Lokelma 10mg BID for 3 days  - CRRT as above    ==================== GASTROINTESTINAL===================  # Ileus, resolved  #+/- coffee ground emesis, resolved  - 1 episode of coffee ground emesis, unclear if true GIB  - CTAP () ileus vs partial SBO - cannot r/o GI bleed  - c/w PPI IV 40 BID   - c/w trickle feeds > TFs advanced 3/4  - Having bowel movements    =======================    ENDOCRINE  =====================  # DMT2  - A1c 5.8  - ISS    ========================INFECTIOUS DISEASE================  # Septic shock   # Hypothermia  # Bacteremia  - Presented with T 91.4F, WBC 21 concerning for possible occult infection   - U/A without LE or Nitrites, CXR without clear consolidation, MRSA negative Lower concern for meningitis at this moment  - 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 No manifest signs of bowel ischemia. Interval worsening of right lower lobe and right middle lobe atelectasis secondary to elevated right hemidiaphragm when compared to prior CT dated 2023. Superimposed infection in the collapsed lungs is not excluded  - s/p meropenem (-)  - Hold off additional doses of Vancomycin (MRSA swab negative)  - c/w ganciclovir  - c/w meropenem  - c/w vanco  - c/w atovaquone and nystatin for ppx  - f/u infectious labs - Toxoplasma, EBV, Fungitell, Galactomannan, CMV, Cryptococcus, MRSA, ASCENCION virus, CMV, Crypto, ASCENCION virus  - f/u GI PCR, consider C. Diff testing   - Transplant ID recommendations   - Consider LP if MS not improving    Patient requires continuous monitoring with bedside rhythm monitoring, pulse ox monitoring, and intermittent blood gas analysis. Care plan discussed with ICU care team. Patient remained critical and at risk for life threatening decompensation.  Patient seen, examined and plan discussed with CCU team during rounds.     I have personally provided 35 minutes of critical care time excluding time spent on separate procedures, in addition to initial critical care time provided by the CICU Attending, Dr. Leigh.        Admission date:  CHIEF COMPLAINT:  HPI:  75yo M pmhx HTN, HLD, nonischemic cardiomyopathy, chronic systolic heart failure s/p HM2 LVAD (2017), s/p heart transplant from Hep. 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 On the phone, the patient's godbrother mentioned that on , the patient was in the car with his godbrother and was disoriented asking to get off on the road 4 blocks before his house. In addition, a caretaker stated that when she spoke to Mr. Hameed on the phone at 11am on , he was doing well. However, when the caretaker visited 2 hours later at 1pm, the patient was disoriented and felt his legs were heavy. This prompted the patient to come to the hospital.     In the ED: Hypothermic 91.4, HR 80, /60, RA   Patient noted to be Hyperkalemic (6.2) with Mixed respiratory and metabolic acidosis pH 7.2. - Patient was given Calcium Gluconate 2g, D50/Insulin 5 unit, 40 mg IV lasix, Lokelma 10mg.   Vancomycin and Zosyn  (2025 07:21)    INTERVAL HISTORY:    REVIEW OF SYSTEMS: Unresponsive    MEDICATIONS  (STANDING):  atovaquone  Suspension 1500 milliGRAM(s) Oral daily  chlorhexidine 0.12% Liquid 15 milliLiter(s) Oral Mucosa every 12 hours  chlorhexidine 2% Cloths 1 Application(s) Topical daily  cholecalciferol 1000 Unit(s) Oral daily  ganciclovir IVPB 115 milliGRAM(s) IV Intermittent <User Schedule>  insulin lispro (ADMELOG) corrective regimen sliding scale   SubCutaneous every 6 hours  meropenem  IVPB 1000 milliGRAM(s) IV Intermittent every 24 hours  norepinephrine Infusion 0.7 MICROgram(s)/kG/Min (119 mL/Hr) IV Continuous <Continuous>  nystatin    Suspension 420134 Unit(s) Oral four times a day  pantoprazole  Injectable 40 milliGRAM(s) IV Push two times a day  polyethylene glycol 3350 17 Gram(s) Oral daily  predniSONE   Tablet 15 milliGRAM(s) Oral daily  senna 2 Tablet(s) Oral daily  sodium bicarbonate 650 milliGRAM(s) Oral every 8 hours  tacrolimus    0.5 mG/mL Suspension 3 milliGRAM(s) Oral <User Schedule>    MEDICATIONS  (PRN):      Objective:  ICU Vital Signs Last 24 Hrs  T(C): 38.1 (05 Mar 2025 17:45), Max: 38.1 (05 Mar 2025 14:30)  T(F): 100.6 (05 Mar 2025 17:45), Max: 100.6 (05 Mar 2025 14:30)  HR: 160 (05 Mar 2025 22:30) (144 - 175)  BP: --  BP(mean): --  ABP: 94/33 (05 Mar 2025 22:30) (5/-7 - 146/51)  ABP(mean): 48 (05 Mar 2025 22:30) (-18 - 85)  RR: 22 (05 Mar 2025 22:30) (16 - 26)  SpO2: 99% (05 Mar 2025 22:30) (80% - 100%)    O2 Parameters below as of 05 Mar 2025 22:00  Patient On (Oxygen Delivery Method): ventilator    O2 Concentration (%): 40            03-04 @ 07:  -  05 @ 07:00  --------------------------------------------------------  IN: 2750.9 mL / OUT: 200 mL / NET: 2550.9 mL    05 @ 07:01  -  05 @ 22:41  --------------------------------------------------------  IN: 2046.5 mL / OUT: 0 mL / NET: 2046.5 mL      Daily     Daily Weight in k.4 (05 Mar 2025 17:15)    PHYSICAL EXAMINATION:  Constitutional: laying in bed, arousable  HEENT: NC/AT, PERRLA, EOMI, no conjunctival pallor or scleral icterus, MMM  Neck: Supple, no JVD  Respiratory: CTA B/L. No w/r/r.   Cardiovascular: regular rate, normal S1 and S2, no m/r/g.   Gastrointestinal: mildly distended, +BS, nontender, no guarding or rebound tenderness, no palpable masses  Extremities: RUE edematous; no cyanosis, clubbing.  Neurological: unresponsive  Skin: approx. 3x3cm area of hyperpigmentation and swelling to R parasternal upper chest wall, no fluctuance no open wounds.      TELEMETRY:     EKG:     IMAGING:    Labs:  ABG - ( 05 Mar 2025 14:40 )  pH, Arterial: 7.29  pH, Blood: x     /  pCO2: 37    /  pO2: 129   / HCO3: 18    / Base Excess: -8.1  /  SaO2: 99.2                                    9.8    45.04 )-----------( 386      ( 05 Mar 2025 11:00 )             30.9     03-05    137  |  103  |  47[H]  ----------------------------<  195[H]  4.7   |  16[L]  |  3.95[H]    Ca    7.4[L]      05 Mar 2025 11:00  Phos  5.3     03-05  Mg     2.5     03-05    TPro  4.5[L]  /  Alb  2.2[L]  /  TBili  0.6  /  DBili  x   /  AST  285[H]  /  ALT  108[H]  /  AlkPhos  85  03-05    LIVER FUNCTIONS - ( 05 Mar 2025 11:00 )  Alb: 2.2 g/dL / Pro: 4.5 g/dL / ALK PHOS: 85 U/L / ALT: 108 U/L / AST: 285 U/L / GGT: x           PT/INR - ( 05 Mar 2025 00:51 )   PT: 13.2 sec;   INR: 1.16 ratio         PTT - ( 05 Mar 2025 14:47 )  PTT:80.9 sec    Urinalysis Basic - ( 05 Mar 2025 11:00 )    Color: x / Appearance: x / SG: x / pH: x  Gluc: 195 mg/dL / Ketone: x  / Bili: x / Urobili: x   Blood: x / Protein: x / Nitrite: x   Leuk Esterase: x / RBC: x / WBC x   Sq Epi: x / Non Sq Epi: x / Bacteria: x        HEALTH ISSUES - PROBLEM Dx:  Acute respiratory acidosis    Hyperkalemia    Stage 3 chronic kidney disease    Metabolic encephalopathy    Systemic inflammatory response syndrome (SIRS)    Need for prophylactic measure    H/O heart transplant    AMS (altered mental status)    NORMAN (acute kidney injury)    Status post heart transplant    Immunosuppression    Prophylactic antibiotic    Deep vein thrombosis (DVT)    Hypertension    DM (diabetes mellitus)    Anemia, hemolytic    Swollen arm    Hemolytic anemia    Paroxysmal atrial fibrillation    Diabetes type 2    Acute kidney injury superimposed on CKD    Vascular disorder of lower extremity    Coffee ground emesis    Ileus    Septic shock        ====================ASSESSMENT ==============  75yo M w/ pmhx HTN, HLD, nonischemic cardiomyopathy, chronic systolic heart failure s/p HM2 LVAD (2017), s/p heart transplant from Hep. 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 Found to have septic shock, ( BCx pseudo,  BCx NGTD). Still episodes of hypotension. Worsening NORMAN. Accepted to MICU for CRRT.    Plan:  ====================== NEUROLOGY=====================  # Metabolic encephalopathy  - Likely multi-factorial etiologies: septic vs uremic vs hypercapnic, less likely meningitis  - Patient baseline AOx3. AOx1 at ED arrival, iso of infection vs metabolic derangements  - CTH and angio w/o hemorrhage or stenosis   - Fall precautions  - spot EEG : negative for seizures   - Currently unresponsive s/p cardiac arrest  - off precedex for 24hrs   -CTH today was negative for acute changes     ==================== RESPIRATORY======================  Acute respiratory failure  -s/p cardiac arrest yesterday, intubated   -ETT to vent with Volume AC, 500//40%   -Trending ABGs     ====================CARDIOVASCULAR==================  # Septic shock from pseudomonas bacteremia  - POCUS reveals no evidence of  fluid overload  - s/p fluid resuscitation  - restarted on levophed gtt post cardiac arrest, capped at 0.7 with no escalation     # h/o heart transplant  - Nonischemic cardiomyopathy, chronic systolic heart failure s/p HM2 LVAD (2017), s/p heart transplant from Hep. C donor (treated) 18   - c/w tacro w/ daily levels  - Intolerant of Cellcept due to leukopenia  - c/w solucortef  - c/w Atovaquone for ppx  - c/w nystatin for ppx  - c/w ASA    # Paroxysmal atrial fibrillation.   - Home regimen : Eliquis 5 mg PO BID at home for hx of of afib and IJ thrombi  - EKG on admission - NSR  - c/w heparin gtt    # Hypertension (chronic)  - Holding home Losartan 75 mg PO QD iso shock    # Vascular disorder of lower extremity   - Extremities appear cool and dry   - c/w local wound care by podiatry   - Podiatry recommendations - pending SHANEKA studies, consider vascular evaluation if abnormal    ===================HEMATOLOGIC/ONC ===================  # Hx of hemolytic anemia, Nicole Syndrome  - Follows with Dr. Rosario as outpatient  - Prednisone 15 mg PO QD --> switched to Solu-cortef 75mg IV q8hr > back to prednisone 3/3  - f/u hematology/oncology recs  - Monitor H&H daily  - f/u hemolysis labs and peripheral smear    # DVT ppx  - Heparin gtt    ===================== RENAL =========================  # NORMAN on CKD  # ATN 2/2 septic shock  - Scr ranged from 1.3-2.1 in . Most recent Scr was 1.75 on 24. On admission, Scr was 1.9 >>> 6  - No improvement with IVF, likely ATN i/s/o contrast/medications  - CRRT stopped 3/3 8pm, plan for iHD  -no plans for further HD, not offered by nephro at this time   - Nephro following    # Hyperkalemia (resolved)   - K 6.2 at ED arrival  - Shifted in the ED, no EKG changes consistent with severe hyper K. Cr not significantly elevated form baseline, c/f RTA from bactrim and Tacro. Stable   - s/p Lokelma 10mg BID for 3 days  - CRRT as above    ==================== GASTROINTESTINAL===================  # Ileus, resolved  #+/- coffee ground emesis, resolved  - 1 episode of coffee ground emesis, unclear if true GIB  - CTAP () ileus vs partial SBO - cannot r/o GI bleed  - c/w PPI IV 40 BID   - c/w trickle feeds > TFs advanced 3/4  - Having bowel movements    =======================    ENDOCRINE  =====================  # DMT2  - A1c 5.8  - ISS    ========================INFECTIOUS DISEASE================  # Septic shock   # Hypothermia  # Bacteremia  - Presented with T 91.4F, WBC 21 concerning for possible occult infection   - U/A without LE or Nitrites, CXR without clear consolidation, MRSA negative Lower concern for meningitis at this moment  - 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 No manifest signs of bowel ischemia. Interval worsening of right lower lobe and right middle lobe atelectasis secondary to elevated right hemidiaphragm when compared to prior CT dated 2023. Superimposed infection in the collapsed lungs is not excluded  - s/p meropenem (-)  - Hold off additional doses of Vancomycin (MRSA swab negative)  - c/w ganciclovir  - c/w meropenem  - c/w vanco  - c/w atovaquone and nystatin for ppx  - f/u infectious labs - Toxoplasma, EBV, Fungitell, Galactomannan, CMV, Cryptococcus, MRSA, ASCENCION virus, CMV, Crypto, ASCENCION virus  - f/u GI PCR, consider C. Diff testing   - Transplant ID recommendations   - Consider LP if MS not improving    Patient requires continuous monitoring with bedside rhythm monitoring, pulse ox monitoring, and intermittent blood gas analysis. Care plan discussed with ICU care team. Patient remained critical and at risk for life threatening decompensation.  Patient seen, examined and plan discussed with CCU team during rounds.     I have personally provided 35 minutes of critical care time excluding time spent on separate procedures, in addition to initial critical care time provided by the CICU Attending, Dr. Leigh.

## 2025-03-05 NOTE — PROGRESS NOTE ADULT - ATTENDING COMMENTS
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 His cultures have cleared and he was now hemodynamically stable but has had persistent altered mental status of unclear etiology undergoing further workup and his renal failure has persisted. He has normal graft function (TTE 2/2025).     While undergoing workup for altered mental status he had a PEA arrest 3/4 of unclear trigger requiring > 30 minutes of ACLS. His neurologic exam is very poor though not meeting criteria for brain death and in mixed shock requiring pressors.     Prognosis is very poor and mortality expected in hours-days. Care is currently capped. Will continue goals of care with family and ideally proceed towards withdrawal of care which is appropriate. Management of immunosuppression as above.

## 2025-03-05 NOTE — AIRWAY PLACEMENT NOTE ADULT - AIRWAY COMMENTS:
ngt in situ, pos gastric contents suctioned via ET
Patient was intubated by anesthesia MD Ramires. Upon noticing patient's stomach expanding, patient was reassessed using glidescope and co2 detector. ETT noted to be in the stomach and was ultimately reintubated using the glidescope with permission from OPAL Warner during CODE. Reconfirmed tube placement with glidescope, CO2 detector, and bilateral breath sounds. Pending CXR.
MD order for intubation verified. The patient was identified by full name and birth date compared to the identification band. Present during the procedure was Alana Crawley. Pt intubated by RT Alvarez.
MD order for intubation verified. The patient was identified by full name and birth date compared to the identification band. Present during the procedure was Alana Crawley. Intubation performed by RT Alvarez.

## 2025-03-05 NOTE — PROGRESS NOTE ADULT - SUBJECTIVE AND OBJECTIVE BOX
INFECTIOUS DISEASES FOLLOW UP-- Sujey Lujan  [phone]    This is a follow up note for this  74yMale with  Hyperkalemia, heart transplant  now intubated, post RRT  moans  restarted on antibiotics with Meropenem       ROS:  CONSTITUTIONAL:  low grade fever,     Allergies    No Known Allergies    Intolerances        ANTIBIOTICS/RELEVANT:  antimicrobials  atovaquone  Suspension 1500 milliGRAM(s) Oral daily  ganciclovir IVPB 115 milliGRAM(s) IV Intermittent <User Schedule>  meropenem  IVPB 1000 milliGRAM(s) IV Intermittent every 24 hours  nystatin    Suspension 989041 Unit(s) Oral four times a day    immunologic:  tacrolimus    0.5 mG/mL Suspension 3 milliGRAM(s) Oral <User Schedule>    OTHER:  chlorhexidine 0.12% Liquid 15 milliLiter(s) Oral Mucosa every 12 hours  chlorhexidine 2% Cloths 1 Application(s) Topical daily  cholecalciferol 1000 Unit(s) Oral daily  insulin lispro (ADMELOG) corrective regimen sliding scale   SubCutaneous every 6 hours  norepinephrine Infusion 0.7 MICROgram(s)/kG/Min IV Continuous <Continuous>  pantoprazole  Injectable 40 milliGRAM(s) IV Push two times a day  polyethylene glycol 3350 17 Gram(s) Oral daily  predniSONE   Tablet 15 milliGRAM(s) Oral daily  senna 2 Tablet(s) Oral daily  sodium bicarbonate 650 milliGRAM(s) Oral every 8 hours      Objective:  Vital Signs Last 24 Hrs  T(C): 38.1 (05 Mar 2025 17:45), Max: 38.1 (05 Mar 2025 14:30)  T(F): 100.6 (05 Mar 2025 17:45), Max: 100.6 (05 Mar 2025 14:30)  HR: 160 (05 Mar 2025 19:00) (27 - 175)  BP: 134/61 (04 Mar 2025 21:30) (102/50 - 178/82)  BP(mean): 88 (04 Mar 2025 21:30) (71 - 115)  RR: 22 (05 Mar 2025 19:00) (16 - 52)  SpO2: 98% (05 Mar 2025 19:00) (51% - 100%)    Parameters below as of 05 Mar 2025 19:00  Patient On (Oxygen Delivery Method): ventilator    O2 Concentration (%): 40    PHYSICAL EXAM:  Constitutional:intubated,  Eyes:WALT, EOMI  Ear/Nose/Throat: no oral lesions, 	  Respiratory: clear BL  hematoma sternal region  Cardiovascular: S1S2  Gastrointestinal:soft, (+) BS, no tenderness  Extremities:no e/e/c  No Lymphadenopathy  IV sites not inflammed.    LABS:                        9.8    45.04 )-----------( 386      ( 05 Mar 2025 11:00 )             30.9     03-05    137  |  103  |  47[H]  ----------------------------<  195[H]  4.7   |  16[L]  |  3.95[H]    Ca    7.4[L]      05 Mar 2025 11:00  Phos  5.3     03-05  Mg     2.5     03-05    TPro  4.5[L]  /  Alb  2.2[L]  /  TBili  0.6  /  DBili  x   /  AST  285[H]  /  ALT  108[H]  /  AlkPhos  85  03-05    PT/INR - ( 05 Mar 2025 00:51 )   PT: 13.2 sec;   INR: 1.16 ratio         PTT - ( 05 Mar 2025 14:47 )  PTT:80.9 sec  Urinalysis Basic - ( 05 Mar 2025 11:00 )    Color: x / Appearance: x / SG: x / pH: x  Gluc: 195 mg/dL / Ketone: x  / Bili: x / Urobili: x   Blood: x / Protein: x / Nitrite: x   Leuk Esterase: x / RBC: x / WBC x   Sq Epi: x / Non Sq Epi: x / Bacteria: x        MICROBIOLOGY:            RECENT CULTURES:  02-28 @ 15:37  Blood Blood  --  --  --    No growth at 4 days  --  02-27 @ 12:15  Blood Blood  --  --  --    No growth at 5 days  --      RADIOLOGY & ADDITIONAL STUDIES:    < from: CT Head No Cont (03.05.25 @ 09:33) >    IMPRESSION: No evidence of acute hemorrhage mass or mass effect.    --- End of Report ---    < end of copied text >  < from: Xray Chest 1 View- PORTABLE-Routine (Xray Chest 1 View- PORTABLE-Routine in AM.) (03.05.25 @ 03:29) >  IMPRESSION:  Decreased congestion. NG tube as noted        Thank you for the courtesy of this referral.    ---End of Report ---    < end of copied text >

## 2025-03-05 NOTE — PROGRESS NOTE ADULT - PROBLEM SELECTOR PLAN 4
- noted to have NORMAN on admission, now ATN from shock  - Nephrology following, appreciate recommendations

## 2025-03-05 NOTE — PROGRESS NOTE ADULT - ATTENDING COMMENTS
juan f-atn   was on crrt  planned for dialysis today but had a pea arrest this am- code lasted 30 mins. now stable on high dose levophed     when patient more stable and depending on discussion with family, will need to re-start CRRT. will not tolerate dialysis.     jyoti abraham  nephrology attending   please contact me on TEAMS   Office- [phone]

## 2025-03-05 NOTE — PROGRESS NOTE ADULT - SUBJECTIVE AND OBJECTIVE BOX
[facility] Division of Kidney Diseases & Hypertension  FOLLOW UP NOTE  [phone]--------------------------------------------------------------------------------  Chief Complaint: NORMAN on CKD, now on CRRT    24 hour events/subjective: Overnight pt w/ PEA arrest and code blue called, ACLS performed w/ ROS. Pt seen and evaluated this morning in the ICU. Pt intubated on high dose IV vasopressor, not on sedation however no mental status. Per RN no notable UOP.     PAST HISTORY  --------------------------------------------------------------------------------  No significant changes to PMH, PSH, FHx, SHx, unless otherwise noted    ALLERGIES & MEDICATIONS  --------------------------------------------------------------------------------  Allergies    No Known Allergies    Intolerances      Standing Inpatient Medications  atorvastatin 10 milliGRAM(s) Oral at bedtime  atovaquone  Suspension 1500 milliGRAM(s) Oral daily  chlorhexidine 0.12% Liquid 15 milliLiter(s) Oral Mucosa every 12 hours  chlorhexidine 2% Cloths 1 Application(s) Topical daily  cholecalciferol 1000 Unit(s) Oral daily  ganciclovir IVPB 115 milliGRAM(s) IV Intermittent <User Schedule>  heparin  Infusion 1000 Unit(s)/Hr IV Continuous <Continuous>  insulin lispro (ADMELOG) corrective regimen sliding scale   SubCutaneous every 6 hours  meropenem  IVPB 1000 milliGRAM(s) IV Intermittent every 24 hours  metoprolol tartrate 25 milliGRAM(s) Enteral Tube once  norepinephrine Infusion 0.7 MICROgram(s)/kG/Min IV Continuous <Continuous>  nystatin    Suspension 404930 Unit(s) Oral four times a day  pantoprazole  Injectable 40 milliGRAM(s) IV Push two times a day  polyethylene glycol 3350 17 Gram(s) Oral daily  predniSONE   Tablet 15 milliGRAM(s) Oral daily  QUEtiapine 25 milliGRAM(s) Oral every 6 hours  senna 2 Tablet(s) Oral daily  tacrolimus    0.5 mG/mL Suspension 3 milliGRAM(s) Oral <User Schedule>    PRN Inpatient Medications  LORazepam   Injectable 0.5 milliGRAM(s) IV Push every 6 hours PRN    REVIEW OF SYSTEMS  --------------------------------------------------------------------------------  see above    VITALS/PHYSICAL EXAM  --------------------------------------------------------------------------------  T(C): 37.6 (03-05-25 @ 06:00), Max: 37.8 (03-04-25 @ 17:00)  HR: 153 (03-05-25 @ 07:00) (27 - 175)  BP: 134/61 (03-04-25 @ 21:30) (87/47 - 178/82)  RR: 22 (03-05-25 @ 07:00) (14 - 52)  SpO2: 95% (03-05-25 @ 07:00) (51% - 100%)  Wt(kg): --    03-04-25 @ 07:01  -  03-05-25 @ 07:00  --------------------------------------------------------  IN: 2750.9 mL / OUT: 200 mL / NET: 2550.9 mL    Physical Exam:  Gen: ill-appearing  HEENT: +ETT  Pulm: CTA B/L  CV: S1S2  Abd: Soft, +BS   Ext: No LE edema B/L  Neuro: Lethargic    Skin: Warm and dry  Dialysis access: [facility]    LABS/STUDIES  --------------------------------------------------------------------------------              10.1   40.09 >-----------<  418      [03-05-25 @ 00:51]              32.2     140  |  101  |  40  ----------------------------<  238      [03-05-25 @ 00:51]  4.0   |  17  |  3.24        Ca     8.2     [03-05-25 @ 00:51]      Mg     2.5     [03-05-25 @ 00:51]      Phos  5.3     [03-05-25 @ 00:51]    TPro  5.2  /  Alb  2.6  /  TBili  0.8  /  DBili  x   /  AST  377  /  ALT  142  /  AlkPhos  124  [03-05-25 @ 00:51]    PT/INR: PT 13.2 , INR 1.16       [03-05-25 @ 00:51]  PTT: 34.6       [03-05-25 @ 00:51]    Creatinine Trend:  SCr 3.24 [03-05 @ 00:51]  SCr 1.87 [03-04 @ 01:05]  SCr 1.40 [03-03 @ 01:12]  SCr 1.40 [03-02 @ 17:11]  SCr 1.43 [03-02 @ 13:01]

## 2025-03-05 NOTE — PROGRESS NOTE ADULT - PROBLEM SELECTOR PLAN 3
- was readmitted with worsening mental status, now c/b PEA arrest and minimal mental status with no gag reflex  - EEG unremarkable  - CT Angio head 2/19 was unremarkable how limited diagnostic study secondary to suboptimal opacification of the   intracranial arterial vasculature  - was planning for LP, now likely deferred pending goals of care

## 2025-03-05 NOTE — PROGRESS NOTE ADULT - ASSESSMENT
73 year old male PMH HTN, HLD, CKD, nonischemic cardiomyopathy, chronic systolic heart failure s/p HM2 LVAD (6/2017), s/p heart transplant from Hep. 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 Superimposed infection in the collapsed lungs is not excluded.    CT abdomen pelvis (February 22) Since prior study 2/22/2025 interval development of diffusely dilated small bowel loops with air-fluid levels. Contrast from prior study has passed into the colon. Findings are favored to represent ileus or less likely partial small bowel obstruction rather than mechanical obstruction. No manifest signs of bowel ischemia.    At this point suspect that Pseudomonas bacteremia was secondary to either a respiratory source or gastrointestinal translocation.  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    CT chest/abdomen/pelvis 2/26  Bronchial mucus plugging with right lower lobe atelectasis.  No CT evidence of occult abscess in the abdomen or pelvis.    MR Chest 2/28    IMPRESSION:  1.  Sternal wires are present without osseous fusion.  2.  Marrow appears preserved given the metallic artifact without evidence   for osteomyelitis.  3.  Sternoclavicular joints appear preserved.  4.  Edema of the right pectoralis along the manubrium and clavicle with   surrounding soft tissue edema. Changes may reflect cellulitis and   myositis with the given history versus injury.  5.  No abscess is seen.      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)  agree with empiric meropenem  would also send depp suction sputum from ET tube to see colonizing jose  GOC discussion with family and proxy Tahira    # AMS  # Pseudomonas bacteremia- completed therapy  # NORMAN on CRRT  --Encephalopathy; Likely multifactorial with sepsis now primary , intrabdominal catastrophe suspected  --consider underlying PML given elevated serum ASCENCION virus value,  -- would try to decrease immunosuppression  Cytomegalovirus By PCR: NotDetec IU/mL (03.01.25 @ 02:16), serum    # Heart Transplant Recipient, Prophylactic Antibiotic  --Continue Atovaquone 1,500 mg PO Q24H for PCP PPx  --Continue Valcyte 450 mg M/W/F for CMV PPx, will change to HD dosing once dialysis started    prognosis poor      Gary Lujan MD  Can be called via Teams  After 5pm/weekends [phone]

## 2025-03-05 NOTE — AIRWAY PLACEMENT NOTE ADULT - POST AIRWAY PLACEMENT ASSESSMENT:
breath sounds bilateral/breath sounds equal/positive end tidal CO2 noted/CXR pending
breath sounds bilateral/breath sounds equal/positive end tidal CO2 noted/CXR pending
breath sounds bilateral/positive end tidal CO2 noted
breath sounds bilateral/breath sounds equal/positive end tidal CO2 noted/CXR pending

## 2025-03-05 NOTE — AIRWAY PLACEMENT NOTE ADULT - AIRWAY TYPE
oral/endotracheal tube
endotracheal tube
The pts situation and the cause of the legs weakness was discussed in details with the pt and his wife over the phone. A procedure with T10 T11 laminectomy and diskectomy was offered and all the alternatives were explained in details, The risks of the procedure including but not limited to infection , postoperative hematoma, leg paralysis, cervical spinal cord compression, stroke , MI DVT, PE, death were explained. Cardiologists characterized the pt as high cardiological risk and this is known to the pt. The pt was strongly willing to move ahead with the surgery despite the risks. Care was taken to avoid cervical cord compression during the intubation and positioning of the pat given the severe spondylosis.
oral/endotracheal tube
endotracheal tube

## 2025-03-05 NOTE — CHART NOTE - NSCHARTNOTEFT_GEN_A_CORE
NUTRITION FOLLOW UP NOTE    PATIENT SEEN FOR: ICU follow up    SOURCE: [] Patient  [x] Current Medical Record  [] RN  [] Family/support person at bedside  [x] Patient unavailable/inappropriate  [] Other:    CHART REVIEWED/EVENTS NOTED.  [] No changes to nutrition care plan to note  [x] Nutrition Status:  -NORMAN on CKD, off of CVVHDF as of 3/3  -septic shock from pseudomonas bacteremia  -Hx chronic systolic heart failure s/p HM2 LVAD (2017), s/p heart transplant 18  -Ileus, resolved  -intubated 3/4 s/p cardiac arrest  -chest x-ray today showing moderate pleural effusion  -CT head today showing no evidence of acute hemorrhage    DIET ORDER:   No current diet order    CURRENT DIET ORDER IS:  [] Appropriate:  [x] Inadequate: defer initiation to medical team  [] Other:    NUTRITION INTAKE/PROVISION:  -No diet order since this morning, previously was ordered for Nepro @ 50ml/hr x 24hr + Banatrol 2x/day.  5-Day Average Enteral Provision per RN flowsheet: 322 mL, 580 kcals, 26 g protein   [] PO:  [] Enteral Nutrition:  [] Parenteral Nutrition:    ANTHROPOMETRICS:  Drug Dosing Weight  Height (cm): 182.9 (2025 16:40)  Weight (kg): 90.7 (2025 16:40)  BMI (kg/m2): 27.1 (2025 16:40)  Weights:   Daily Weight in k (-04), 72.1 (-), 76.6 (-), 70.3 (-), 77.2 (-), 77.8 (-), 78.2 (-)  Weight fluctuations likely in setting of fluid shifts and/or scale inaccuracies. Note daily weights lower than dosing weight. RD to continue to monitor weight trends as available/able.     NUTRITIONALLY PERTINENT MEDICATIONS:  MEDICATIONS  (STANDING):  atovaquone  Suspension 1500 milliGRAM(s) Oral daily  cholecalciferol 1000 Unit(s) Oral daily  ganciclovir IVPB 115 milliGRAM(s) IV Intermittent <User Schedule>  heparin  Infusion 1000 Unit(s)/Hr (8 mL/Hr) IV Continuous <Continuous>  insulin lispro (ADMELOG) corrective regimen sliding scale   SubCutaneous every 6 hours  meropenem  IVPB 1000 milliGRAM(s) IV Intermittent every 24 hours  norepinephrine Infusion 0.7 MICROgram(s)/kG/Min (119 mL/Hr) IV Continuous <Continuous>  nystatin    Suspension 357794 Unit(s) Oral four times a day  pantoprazole  Injectable 40 milliGRAM(s) IV Push two times a day  polyethylene glycol 3350 17 Gram(s) Oral daily  predniSONE   Tablet 15 milliGRAM(s) Oral daily  senna 2 Tablet(s) Oral daily  sodium bicarbonate 650 milliGRAM(s) Oral every 8 hours  tacrolimus    0.5 mG/mL Suspension 3 milliGRAM(s) Oral <User Schedule>      NUTRITIONALLY PERTINENT LABS:   Na137 mmol/L Glu 195 mg/dL[H] K+ 4.7 mmol/L Cr  3.95 mg/dL[H] BUN 47 mg/dL[H]  Phos 5.3 mg/dL[H]  Alb 2.2 g/dL[L]  Chol 95 mg/dL LDL --    HDL 55 mg/dL Trig 65 mg/dL  U/L[H]  U/L[H] Alkaline Phosphatase 85 U/L  25 @ 09:38 a1c 5.8  25 @ 05:35 a1c 5.8    A1C with Estimated Average Glucose Result: 5.8 % (25 @ 09:38)  A1C with Estimated Average Glucose Result: 5.8 % (25 @ 05:35)          Finger Sticks:  POCT Blood Glucose.: 206 mg/dL ( @ 06:23)  POCT Blood Glucose.: 221 mg/dL ( @ 00:42)  POCT Blood Glucose.: 179 mg/dL ( @ 17:01)      NUTRITIONALLY PERTINENT MEDICATIONS/LABS:  [x] Reviewed  [x] Relevant notes on medications/labs:  -immunosuppressants for h/o heart transplant  -steroid which can increase blood glucose levels  -insulin for glycemic control  -norepinephrine @ 0.7 micrograms/kG/min at time of RD visit    EDEMA:  [x] Reviewed  [x] Relevant notes: 1+ periorbital; 2+ right arm, right hand per flowsheets     GI/ I&O:  [x] Reviewed  [x] Relevant notes: last BM 3/4 per flowsheets.   [x] Other: Noted with 600ml output via NGT set to suction @ 6AM today:  I&O's Detail  OUT:    Gastric Aspirate - Discarded (mL): 200 mL    SKIN:   [x] No pressure injuries documented, per nursing flowsheet  [] Pressure injury previously noted  [] Change in pressure injury documentation:  [x] Other:  Per Wound Care note :  Right Arm - Wound secondary to  Swelling  Bilateral Heels - Fissures    ESTIMATED NEEDS:  [] No change:  [x] Updated:  Energy:  3697-3372 kcal/day (27-32 kcal/kg)  Protein:   g/day (1.2-1.6 g/kg)  Fluid:   ml/day or [x] defer to team  Based on: lowest daily weight 69kg (3/4)  [place] Equation: 1893 kcal    NUTRITION DIAGNOSIS:  [x] Prior Dx: Increased Nutrient Needs (protein-energy), Inadequate Energy Intake   [] New Dx:    EDUCATION:  [] Yes:  [x] Not appropriate/warranted    NUTRITION CARE PLAN:  1. Diet: If/when enteral nutrition restarted, recommend Vital 1.5 @ 10ml/hr, increase by 10ml q6hr as tolerated to goal rate 55ml/hr x 24hr. Provides 1320ml formula, 1980kcal, 89g protein, and 1008ml free water; meets 28.7kcal/kg and 1.29g/kg protein based on lowest daily weight 69kg. Defer free water flushes to medical team.    [] Achieved - Continue current nutrition intervention(s)  [] Current medical condition precludes nutrition intervention at this time.    MONITORING AND EVALUATION:   RD remains available upon request and will follow up per protocol.    Corinne Lima RDN, CDN - available via [place] TEAMS

## 2025-03-05 NOTE — PROGRESS NOTE ADULT - ASSESSMENT
73yo M w/ pmhx HTN, HLD, nonischemic cardiomyopathy, chronic systolic heart failure s/p HM2 LVAD (6/2017), s/p heart transplant from Hep. 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 Found to have septic shock, (2/19 BCx pseudo, 2/22 BCx NGTD). Course c/b anuric NORMAN requiring CVVH. Also persistent AMS. Unclear bacterial source. Now course c/b by PEA arrest requiring intubation, grave prognosis.    TTE 2/25/25: EF 54%, mildly reduced RV function

## 2025-03-05 NOTE — PHYSICAL THERAPY INITIAL EVALUATION ADULT - FUNCTIONAL LIMITATIONS, PT EVAL
[No Acute Distress] : no acute distress [Normal Oropharynx] : normal oropharynx [III] : Mallampati Class: III [Normal Appearance] : normal appearance [No Neck Mass] : no neck mass [Normal Rate/Rhythm] : normal rate/rhythm [Normal S1, S2] : normal s1, s2 [No Murmurs] : no murmurs [No Resp Distress] : no resp distress [Clear to Auscultation Bilaterally] : clear to auscultation bilaterally [No Abnormalities] : no abnormalities [Benign] : benign [Normal Gait] : normal gait [No Clubbing] : no clubbing [No Cyanosis] : no cyanosis [No Edema] : no edema [FROM] : FROM [Normal Color/ Pigmentation] : normal color/ pigmentation [No Focal Deficits] : no focal deficits [Oriented x3] : oriented x3 [Normal Affect] : normal affect self-care

## 2025-03-05 NOTE — PROGRESS NOTE ADULT - ATTENDING COMMENTS
History of heart transplant in 2018  Admitted with encephalopathy of unknown origin  Overnight had a PEA arrest  ROSC was achieved multiple times but during attempted airway intubation he was intubated in his esophagus   Reportedly he remained that way for several minutes before being successfully intubated tracheally  No sedation, pupils fixed and dilated, no brainstem reflexes - check head CT  Defer LP   Vasodilatory shock requiring Levophed infusion post arrest - wean as able  Continue outpatient Prednisone along with outpatient Tacro  TTE with preserved LVEF  Tolerating tube feeds  Oliguric NORMAN; I feel he is too unstable to tolerate either iHD or CVVH   H/H low but acceptable on Heparin drip for afib   Afebrile, on empiric Meropenem and Vancomycin, cultures negative  Sugars controlled  SAMEERA Pickard 3/3  Prognosis is poor - as has been discussed with the family, I think further aggressive care is unfortunately medically futile and therefore care is capped.

## 2025-03-05 NOTE — PROGRESS NOTE ADULT - ASSESSMENT
====================ASSESSMENT ==============  75yo M w/ pmhx HTN, HLD, nonischemic cardiomyopathy, chronic systolic heart failure s/p HM2 LVAD (6/2017), s/p heart transplant from Hep. 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 Found to have septic shock, (2/19 BCx pseudo, 2/22 BCx NGTD). Still episodes of hypotension. Worsening NORMAN. Accepted to MICU for CRRT.    Plan:  ====================== NEUROLOGY=====================  # Metabolic encephalopathy  - Likely multi-factorial etiologies: septic vs uremic vs hypercapnic, less likely meningitis  - Patient baseline AOx3. AOx1 at ED arrival, iso of infection vs metabolic derangements  - CTH and angio w/o hemorrhage or stenosis   - Fall precautions  - spot EEG 2/21: negative for seizures   - Currently A&Ox0-1  - Neuro following  - Monitor mental status per protocol  - c/w precedex gtt 1.5 for agitation, wean as tolerated  - start seroquel 25mg PO q6hr per psych recs, monitor QTc  - PRN ativan 0.5mg PO/IV    ==================== RESPIRATORY======================  - No active issues  - SpO2 WNL on room air    ====================CARDIOVASCULAR==================  # Septic shock from pseudomonas bacteremia  - POCUS reveals no evidence of  fluid overload  - s/p fluid resuscitation  - s/p levo, off 2/24  - Lactate WNL    # h/o heart transplant  - Nonischemic cardiomyopathy, chronic systolic heart failure s/p HM2 LVAD (6/2017), s/p heart transplant from Hep. C donor (treated) 2/23/18   - c/w tacro w/ daily levels  - Intolerant of Cellcept due to leukopenia  - c/w home prednisone 15mg daily  - c/w Atovaquone for ppx  - c/w nystatin for ppx  - hold ASA (for possible LP this week)    # Paroxysmal atrial fibrillation.   - Home regimen : Eliquis 5 mg PO BID at home for hx of of afib and IJ thrombi  - EKG on admission - NSR  - c/w lopressor 25 BID  - c/w heparin gtt    # Hypertension (chronic)  - Holding home Losartan 75 mg PO QD iso shock    # Vascular disorder of lower extremity   - Extremities appear cool and dry   - c/w local wound care by podiatry     ===================HEMATOLOGIC/ONC ===================  # Hx of hemolytic anemia, Sanchez Syndrome  - Follows with Dr. Rosario as outpatient  - Prednisone 15 mg PO QD --> switched to Solu-cortef 75mg IV q8hr (2/26-3/3) > back to prednisone 15mg (3/3)  - f/u hematology/oncology recs  - Monitor H&H daily  - f/u hemolysis labs and peripheral smear    # DVT ppx  - Heparin gtt for Afib    ===================== RENAL =========================  # NORMAN on CKD  # ATN 2/2 septic shock  - Scr ranged from 1.3-2.1 in 2023. Most recent Scr was 1.75 on 1/30/24. On admission, Scr was 1.9 >>> 6  - No improvement with IVF, likely ATN i/s/o contrast/medications  - CRRT stopped 3/3 8pm, plan for iHD  - trial bumex 4mg IV x1 (3/4)  - Nephro following    # Hyperkalemia (resolved)   - K 6.2 at ED arrival  - Shifted in the ED, no EKG changes consistent with severe hyper K. Cr not significantly elevated form baseline, c/f RTA from bactrim and Tacro. Stable   - s/p Lokelma 10mg BID for 3 days  - dialysis as above    ==================== GASTROINTESTINAL===================  # Ileus, resolved  #+/- coffee ground emesis, resolved  - 1 episode of coffee ground emesis, unclear if true GIB  - CTAP (2/22) ileus vs partial SBO - cannot r/o GI bleed  - c/w PPI IV 40 BID   - c/w trickle feeds > TFs advanced 3/4  - Having bowel movements    =======================    ENDOCRINE  =====================  # DMT2  - A1c 5.8  - ISS    ========================INFECTIOUS DISEASE================  # Septic shock   # Hypothermia  # bacteremia  - Presented with T 91.4F, WBC 21 concerning for possible occult infection,   - U/A without LE or Nitrites, CXR without clear consolidation, MRSA negative Lower concern for meningitis at this moment  - 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 No manifest signs of bowel ischemia. Interval worsening of right lower lobe and right middle lobe atelectasis secondary to elevated right hemidiaphragm when compared to prior CT dated 7/11/2023. Superimposed infection in the collapsed lungs is not excluded  - meropenem (2/21-2/24), restarted meropenem for CNS coverage (2/28 - )  - vancomycin for CNS coverage (2/28 - )  - s/p zosyn (2/24 - 2/28)  - MRSA swab negative  - hold home Valganciclovir for ppx --> switch to ganciclovir ppx  - f/u infectious labs - Toxoplasma, EBV, Fungitell, Galactomannan, CMV, Cryptococcus, MRSA, ASCENCION virus, CMV, Crypto, ASCENCION virus  - f/u GI PCR, consider C. Diff testing   - Transplant ID recommendations   - Consider LP if MS not improving - holding ASA for possible LP this week  - Warm blanket      Patient requires continuous monitoring with bedside rhythm monitoring, pulse ox monitoring, and intermittent blood gas analysis. Care plan discussed with ICU care team. Patient remained critical and at risk for life threatening decompensation.  Patient seen, examined and plan discussed with CCU team during rounds.    ====================ASSESSMENT ==============  73yo M w/ pmhx HTN, HLD, nonischemic cardiomyopathy, chronic systolic heart failure s/p HM2 LVAD (6/2017), s/p heart transplant from Hep. 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 Found to have septic shock, (2/19 BCx pseudo, 2/22 BCx NGTD). Still episodes of hypotension. Worsening NORMAN. Accepted to MICU for CRRT.    Plan:  ====================== NEUROLOGY=====================  # Metabolic encephalopathy  - Likely multi-factorial etiologies: septic vs uremic vs hypercapnic, less likely meningitis  - Patient baseline AOx3. AOx1 at ED arrival, iso of infection vs metabolic derangements  - CTH and angio in ED w/o hemorrhage or stenosis   - spot EEG 2/21: negative for seizures  - s/p PEA arrest (3/4) with approx. 30min downtime  - Currently A&Ox0  - Neuro following  - Monitor mental status per protocol  - s/p precedex gtt 1.5 for agitation (off 3/4)  - s/p seroquel 25mg PO q6hr per psych recs, (off 3/5)    ==================== RESPIRATORY======================  - intubated (3/4) s/p cardiac arrest  - 400/6/22/40%  - CXR with R-sided moderate effusion (3/5)    ====================CARDIOVASCULAR==================  # Septic shock from pseudomonas bacteremia  - POCUS reveals no evidence of  fluid overload  - s/p fluid resuscitation  - Lactate WNL    #PEA arrest (3/4)  - approx. 30min downtime  - Pressors: levo 0.7 (3/4- )    # h/o heart transplant  - Nonischemic cardiomyopathy, chronic systolic heart failure s/p HM2 LVAD (6/2017), s/p heart transplant from Hep. C donor (treated) 2/23/18   - c/w tacro w/ daily levels  - Intolerant of Cellcept due to leukopenia  - c/w home prednisone 15mg daily  - c/w Atovaquone for ppx  - c/w nystatin for ppx  - hold home ASA    # Paroxysmal atrial fibrillation.   - Home regimen : Eliquis 5 mg PO BID at home for hx of of afib and IJ thrombi  - EKG on admission - NSR  - hold lopressor 25 BID (3/4)  - c/w heparin gtt    # Hypertension (chronic)  - Holding home Losartan 75 mg PO QD iso shock    # Vascular disorder of lower extremity   - Extremities appear cool and dry   - c/w local wound care by podiatry     ===================HEMATOLOGIC/ONC ===================  # Hx of hemolytic anemia, Sanchez Syndrome  - Follows with Dr. Rosario as outpatient  - Prednisone 15 mg PO QD --> switched to Solu-cortef 75mg IV q8hr (2/26-3/3) > back to prednisone 15mg (3/3)  - f/u hematology/oncology recs  - Monitor H&H daily  - f/u hemolysis labs and peripheral smear    # DVT ppx  - Heparin gtt for Afib    ===================== RENAL =========================  # NORMAN on CKD  # ATN 2/2 septic shock  - Scr ranged from 1.3-2.1 in 2023. Most recent Scr was 1.75 on 1/30/24. On admission, Scr was 1.9 >>> 6  - No improvement with IVF, likely ATN i/s/o contrast/medications  - CRRT stopped 3/3 8pm  - trial bumex 4mg IV x1 (3/4) -> no output, bladder scan 3/5 with 180cc urine  - pt on levo s/p cardiac arrest 3/4, likely unable to tolerate HD/CRRT  - started sodium bicarb 650mg TID (3/5)  - Nephro following    # Hyperkalemia (resolved)   - K 6.2 at ED arrival  - Shifted in the ED, no EKG changes consistent with severe hyper K. Cr not significantly elevated form baseline, c/f RTA from bactrim and Tacro. Stable   - s/p Lokelma 10mg BID for 3 days  - dialysis as above    ==================== GASTROINTESTINAL===================  # Ileus, resolved  #+/- coffee ground emesis, resolved  - 1 episode of coffee ground emesis, unclear if true GIB  - CTAP (2/22) ileus vs partial SBO - cannot r/o GI bleed  - c/w PPI IV 40 BID  - Having bowel movements  - feeds held 3/4 s/p cardiac arrest    =======================    ENDOCRINE  =====================  # DMT2  - A1c 5.8  - ISS    ========================INFECTIOUS DISEASE================  # Septic shock   # Hypothermia  # bacteremia  - Presented with T 91.4F, WBC 21 concerning for possible occult infection,   - U/A without LE or Nitrites, CXR without clear consolidation, MRSA negative Lower concern for meningitis at this moment  - 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 No manifest signs of bowel ischemia. Interval worsening of right lower lobe and right middle lobe atelectasis secondary to elevated right hemidiaphragm when compared to prior CT dated 7/11/2023. Superimposed infection in the collapsed lungs is not excluded  - meropenem (2/21-2/24), restarted meropenem for CNS coverage (2/28 - )  - vancomycin for CNS coverage (2/28 - )  - s/p zosyn (2/24 - 2/28)  - MRSA swab negative  - hold home Valganciclovir for ppx --> switch to ganciclovir ppx  - f/u infectious labs - Toxoplasma, EBV, Fungitell, Galactomannan, CMV, Cryptococcus, MRSA, ASCENCION virus, CMV, Crypto, ASCENCION virus  - f/u GI PCR, consider C. Diff testing   - Transplant ID recommendations   - Consider LP if MS not improving - holding ASA for possible LP this week  - Warm blanket      Patient requires continuous monitoring with bedside rhythm monitoring, pulse ox monitoring, and intermittent blood gas analysis. Care plan discussed with ICU care team. Patient remained critical and at risk for life threatening decompensation.  Patient seen, examined and plan discussed with CCU team during rounds.

## 2025-03-05 NOTE — PROGRESS NOTE ADULT - PROBLEM SELECTOR PLAN 2
- s/p LVAD explant and OHT 2/23/18 with hepatitis c donor  - continue ASA 81 mg PO QD and atorva 10  - holding home bactrim due to hyperkalemia (was on Bactrim S/S PO M/W/F). Now atovaquone  - Ganciclovir 2.5 mg/kg IV Q24H (if on CRRT) (For HSV 1/2 and CMV coverage), continue nystatin  - continue nystatin 4 times daily  - Adjust prograf for goal 3-5  - tacro 3mg BID  - prednisone at 15mg daily

## 2025-03-05 NOTE — PROGRESS NOTE ADULT - SUBJECTIVE AND OBJECTIVE BOX
Patient seen and examined at bedside.    Overnight Events: PEA arrest, now intubated with capped pressors    REVIEW OF SYSTEMS:  CONSTITUTIONAL: No weakness, fevers or chills  EYES/ENT: No visual changes;  No dysphagia  NECK: No pain or stiffness  RESPIRATORY: No cough, wheezing, hemoptysis; No shortness of breath  CARDIOVASCULAR: No chest pain or palpitations; No lower extremity edema  GASTROINTESTINAL: No abdominal or epigastric pain. No nausea, vomiting, or hematemesis; No diarrhea or constipation. No melena or hematochezia.  BACK: No back pain  GENITOURINARY: No dysuria, frequency or hematuria  NEUROLOGICAL: No numbness or weakness  SKIN: No itching, burning, rashes, or lesions   All other review of systems is negative unless indicated above.            Current Meds:  atovaquone  Suspension 1500 milliGRAM(s) Oral daily  chlorhexidine 0.12% Liquid 15 milliLiter(s) Oral Mucosa every 12 hours  chlorhexidine 2% Cloths 1 Application(s) Topical daily  cholecalciferol 1000 Unit(s) Oral daily  ganciclovir IVPB 115 milliGRAM(s) IV Intermittent <User Schedule>  heparin  Infusion 1000 Unit(s)/Hr IV Continuous <Continuous>  insulin lispro (ADMELOG) corrective regimen sliding scale   SubCutaneous every 6 hours  meropenem  IVPB 1000 milliGRAM(s) IV Intermittent every 24 hours  norepinephrine Infusion 0.7 MICROgram(s)/kG/Min IV Continuous <Continuous>  nystatin    Suspension 376255 Unit(s) Oral four times a day  pantoprazole  Injectable 40 milliGRAM(s) IV Push two times a day  polyethylene glycol 3350 17 Gram(s) Oral daily  predniSONE   Tablet 15 milliGRAM(s) Oral daily  senna 2 Tablet(s) Oral daily  sodium bicarbonate 650 milliGRAM(s) Oral every 8 hours  tacrolimus    0.5 mG/mL Suspension 3 milliGRAM(s) Oral <User Schedule>      Vitals:  T(F): 100.3 (03-05), Max: 100.3 (03-05)  HR: 150 (03-05) (27 - 175)  BP: 134/61 (03-04) (87/47 - 178/82)  RR: 19 (03-05)  SpO2: 96% (03-05)  I&O's Summary    04 Mar 2025 07:01  -  05 Mar 2025 07:00  --------------------------------------------------------  IN: 2750.9 mL / OUT: 200 mL / NET: 2550.9 mL    05 Mar 2025 07:01  -  05 Mar 2025 12:27  --------------------------------------------------------  IN: 854 mL / OUT: 0 mL / NET: 854 mL        Physical Exam:  GEN: intubated  PULM: ventilated  CV: tachycardic  ABD: S, NT, ND  EXT: no edema                            9.8    45.04 )-----------( 386      ( 05 Mar 2025 11:00 )             30.9     03-05    137  |  103  |  47[H]  ----------------------------<  195[H]  4.7   |  16[L]  |  3.95[H]    Ca    7.4[L]      05 Mar 2025 11:00  Phos  5.3     03-05  Mg     2.5     03-05    TPro  4.5[L]  /  Alb  2.2[L]  /  TBili  0.6  /  DBili  x   /  AST  285[H]  /  ALT  108[H]  /  AlkPhos  85  03-05    PT/INR - ( 05 Mar 2025 00:51 )   PT: 13.2 sec;   INR: 1.16 ratio         PTT - ( 05 Mar 2025 00:51 )  PTT:34.6 sec

## 2025-03-05 NOTE — PROGRESS NOTE ADULT - SUBJECTIVE AND OBJECTIVE BOX
PATIENT: BILLY RUSSELL, MRN: [id number]    CHIEF COMPLAINT: Patient is a 74y old  Male who presents with a chief complaint of Lethargy (05 Mar 2025 07:25)      INTERVAL HISTORY/OVERNIGHT EVENTS: Pt with PEA arrest overnight, coded for approx. 30min, intubated, ROSC was achieved.  Pt remains intubated, on pressors.    REVIEW OF SYSTEMS:    Constitutional:     [ ] negative [ ] fevers [ ] chills [ ] weight loss [ ] weight gain  HEENT:                  [ ] negative [ ] dry eyes [ ] eye irritation [ ] postnasal drip [ ] nasal congestion  CV:                         [ ] negative  [ ] chest pain [ ] orthopnea [ ] palpitations [ ] murmur  Resp:                     [ ] negative [ ] cough [ ] shortness of breath [ ] dyspnea [ ] wheezing [ ] sputum [ ] hemoptysis  GI:                          [ ] negative [ ] nausea [ ] vomiting [ ] diarrhea [ ] constipation [ ] abd pain [ ] dysphagia   :                        [ ] negative [ ] dysuria [ ] nocturia [ ] hematuria [ ] increased urinary frequency  Musculoskeletal: [ ] negative [ ] back pain [ ] myalgias [ ] arthralgias [ ] fracture  Skin:                       [ ] negative [ ] rash [ ] itch  Neurological:        [ ] negative [ ] headache [ ] dizziness [ ] syncope [ ] weakness [ ] numbness  Psychiatric:           [ ] negative [ ] anxiety [ ] depression  Endocrine:            [ ] negative [ ] diabetes [ ] thyroid problem  Heme/Lymph:      [ ] negative [ ] anemia [ ] bleeding problem  Allergic/Immune: [ ] negative [ ] itchy eyes [ ] nasal discharge [ ] hives [ ] angioedema    [ ] All other systems negative  [x ] Unable to assess ROS because __AMS/Intubated_____.    MEDICATIONS:  MEDICATIONS  (STANDING):  atorvastatin 10 milliGRAM(s) Oral at bedtime  atovaquone  Suspension 1500 milliGRAM(s) Oral daily  chlorhexidine 0.12% Liquid 15 milliLiter(s) Oral Mucosa every 12 hours  chlorhexidine 2% Cloths 1 Application(s) Topical daily  cholecalciferol 1000 Unit(s) Oral daily  ganciclovir IVPB 115 milliGRAM(s) IV Intermittent <User Schedule>  heparin  Infusion 1000 Unit(s)/Hr (8 mL/Hr) IV Continuous <Continuous>  insulin lispro (ADMELOG) corrective regimen sliding scale   SubCutaneous every 6 hours  meropenem  IVPB 1000 milliGRAM(s) IV Intermittent every 24 hours  norepinephrine Infusion 0.7 MICROgram(s)/kG/Min (119 mL/Hr) IV Continuous <Continuous>  nystatin    Suspension 780080 Unit(s) Oral four times a day  pantoprazole  Injectable 40 milliGRAM(s) IV Push two times a day  polyethylene glycol 3350 17 Gram(s) Oral daily  predniSONE   Tablet 15 milliGRAM(s) Oral daily  QUEtiapine 25 milliGRAM(s) Oral every 6 hours  senna 2 Tablet(s) Oral daily  tacrolimus    0.5 mG/mL Suspension 3 milliGRAM(s) Oral <User Schedule>    MEDICATIONS  (PRN):  LORazepam   Injectable 0.5 milliGRAM(s) IV Push every 6 hours PRN anxiety/agitation      ALLERGIES: Allergies    No Known Allergies    Intolerances        OBJECTIVE:  ICU Vital Signs Last 24 Hrs  T(C): 37.9 (05 Mar 2025 07:15), Max: 37.9 (05 Mar 2025 07:15)  T(F): 100.3 (05 Mar 2025 07:15), Max: 100.3 (05 Mar 2025 07:15)  HR: 150 (05 Mar 2025 08:45) (27 - 175)  BP: 134/61 (04 Mar 2025 21:30) (87/47 - 178/82)  BP(mean): 88 (04 Mar 2025 21:30) (61 - 115)  ABP: 103/32 (05 Mar 2025 08:45) (84/50 - 128/61)  ABP(mean): 53 (05 Mar 2025 08:45) (53 - 85)  RR: 22 (05 Mar 2025 08:45) (14 - 52)  SpO2: 100% (05 Mar 2025 08:45) (51% - 100%)    O2 Parameters below as of 05 Mar 2025 08:45  Patient On (Oxygen Delivery Method): ventilator    O2 Concentration (%): 40      Mode: AC/ CMV (Assist Control/ Continuous Mandatory Ventilation)  RR (machine): 22  TV (machine): 500  FiO2: 40  PEEP: 6  ITime: 1  MAP: 15  PIP: 23    Adult Advanced Hemodynamics Last 24 Hrs  CVP(mm Hg): --  CVP(cm H2O): --  CO: --  CI: --  PA: --  PA(mean): --  PCWP: --  SVR: --  SVRI: --  PVR: --  PVRI: --  CAPILLARY BLOOD GLUCOSE      POCT Blood Glucose.: 206 mg/dL (05 Mar 2025 06:23)  POCT Blood Glucose.: 221 mg/dL (05 Mar 2025 00:42)  POCT Blood Glucose.: 179 mg/dL (04 Mar 2025 17:01)  POCT Blood Glucose.: 140 mg/dL (04 Mar 2025 11:27)    CAPILLARY BLOOD GLUCOSE      POCT Blood Glucose.: 206 mg/dL (05 Mar 2025 06:23)    I&O's Summary    04 Mar 2025 07:01  -  05 Mar 2025 07:00  --------------------------------------------------------  IN: 2750.9 mL / OUT: 200 mL / NET: 2550.9 mL    05 Mar 2025 07:01  -  05 Mar 2025 09:05  --------------------------------------------------------  IN: 246 mL / OUT: 0 mL / NET: 246 mL      Daily     Daily     PHYSICAL EXAMINATION:  General: Comfortable, no acute distress, cooperative with exam.  HEENT: Moist mucous membranes.  Respiratory: CTAB, normal respiratory effort, no coughing, wheezes, crackles, or rales.  CV: RRR, S1S2, no murmurs, rubs or gallops. No JVD. Distal pulses intact.  Abdominal: Soft, nontender, nondistended, no rebound or guarding, normal bowel sounds.  Neurology: AOx3, no focal neuro defects, RUVALCABA x 4.  Extremities: No pitting edema, + Peripheral pulses.  Cath site:   Tubes:    LABS:  ABG - ( 05 Mar 2025 00:47 )  pH, Arterial: 7.34  pH, Blood: x     /  pCO2: 35    /  pO2: 108   / HCO3: 19    / Base Excess: -6.1  /  SaO2: 98.0                                    10.1   40.09 )-----------( 418      ( 05 Mar 2025 00:51 )             32.2     03-05    140  |  101  |  40[H]  ----------------------------<  238[H]  4.0   |  17[L]  |  3.24[H]    Ca    8.2[L]      05 Mar 2025 00:51  Phos  5.3     03-05  Mg     2.5     03-05    TPro  5.2[L]  /  Alb  2.6[L]  /  TBili  0.8  /  DBili  x   /  AST  377[H]  /  ALT  142[H]  /  AlkPhos  124[H]  03-05    LIVER FUNCTIONS - ( 05 Mar 2025 00:51 )  Alb: 2.6 g/dL / Pro: 5.2 g/dL / ALK PHOS: 124 U/L / ALT: 142 U/L / AST: 377 U/L / GGT: x           PT/INR - ( 05 Mar 2025 00:51 )   PT: 13.2 sec;   INR: 1.16 ratio         PTT - ( 05 Mar 2025 00:51 )  PTT:34.6 sec        Urinalysis Basic - ( 05 Mar 2025 00:51 )    Color: x / Appearance: x / SG: x / pH: x  Gluc: 238 mg/dL / Ketone: x  / Bili: x / Urobili: x   Blood: x / Protein: x / Nitrite: x   Leuk Esterase: x / RBC: x / WBC x   Sq Epi: x / Non Sq Epi: x / Bacteria: x        TELEMETRY:     EKG:     IMAGING:    PATIENT: BILLY RUSSELL, MRN: [id number]    CHIEF COMPLAINT: Patient is a 74y old  Male who presents with a chief complaint of Lethargy (05 Mar 2025 07:25)      INTERVAL HISTORY/OVERNIGHT EVENTS: Pt with PEA arrest overnight, coded for approx. 30min, intubated, ROSC was achieved.  Pt remains intubated, on pressors.    REVIEW OF SYSTEMS:    Constitutional:     [ ] negative [ ] fevers [ ] chills [ ] weight loss [ ] weight gain  HEENT:                  [ ] negative [ ] dry eyes [ ] eye irritation [ ] postnasal drip [ ] nasal congestion  CV:                         [ ] negative  [ ] chest pain [ ] orthopnea [ ] palpitations [ ] murmur  Resp:                     [ ] negative [ ] cough [ ] shortness of breath [ ] dyspnea [ ] wheezing [ ] sputum [ ] hemoptysis  GI:                          [ ] negative [ ] nausea [ ] vomiting [ ] diarrhea [ ] constipation [ ] abd pain [ ] dysphagia   :                        [ ] negative [ ] dysuria [ ] nocturia [ ] hematuria [ ] increased urinary frequency  Musculoskeletal: [ ] negative [ ] back pain [ ] myalgias [ ] arthralgias [ ] fracture  Skin:                       [ ] negative [ ] rash [ ] itch  Neurological:        [ ] negative [ ] headache [ ] dizziness [ ] syncope [ ] weakness [ ] numbness  Psychiatric:           [ ] negative [ ] anxiety [ ] depression  Endocrine:            [ ] negative [ ] diabetes [ ] thyroid problem  Heme/Lymph:      [ ] negative [ ] anemia [ ] bleeding problem  Allergic/Immune: [ ] negative [ ] itchy eyes [ ] nasal discharge [ ] hives [ ] angioedema    [ ] All other systems negative  [x ] Unable to assess ROS because __AMS/Intubated_____.    MEDICATIONS:  MEDICATIONS  (STANDING):  atorvastatin 10 milliGRAM(s) Oral at bedtime  atovaquone  Suspension 1500 milliGRAM(s) Oral daily  chlorhexidine 0.12% Liquid 15 milliLiter(s) Oral Mucosa every 12 hours  chlorhexidine 2% Cloths 1 Application(s) Topical daily  cholecalciferol 1000 Unit(s) Oral daily  ganciclovir IVPB 115 milliGRAM(s) IV Intermittent <User Schedule>  heparin  Infusion 1000 Unit(s)/Hr (8 mL/Hr) IV Continuous <Continuous>  insulin lispro (ADMELOG) corrective regimen sliding scale   SubCutaneous every 6 hours  meropenem  IVPB 1000 milliGRAM(s) IV Intermittent every 24 hours  norepinephrine Infusion 0.7 MICROgram(s)/kG/Min (119 mL/Hr) IV Continuous <Continuous>  nystatin    Suspension 132620 Unit(s) Oral four times a day  pantoprazole  Injectable 40 milliGRAM(s) IV Push two times a day  polyethylene glycol 3350 17 Gram(s) Oral daily  predniSONE   Tablet 15 milliGRAM(s) Oral daily  QUEtiapine 25 milliGRAM(s) Oral every 6 hours  senna 2 Tablet(s) Oral daily  tacrolimus    0.5 mG/mL Suspension 3 milliGRAM(s) Oral <User Schedule>    MEDICATIONS  (PRN):  LORazepam   Injectable 0.5 milliGRAM(s) IV Push every 6 hours PRN anxiety/agitation      ALLERGIES: Allergies    No Known Allergies    Intolerances        OBJECTIVE:  ICU Vital Signs Last 24 Hrs  T(C): 37.9 (05 Mar 2025 07:15), Max: 37.9 (05 Mar 2025 07:15)  T(F): 100.3 (05 Mar 2025 07:15), Max: 100.3 (05 Mar 2025 07:15)  HR: 150 (05 Mar 2025 08:45) (27 - 175)  BP: 134/61 (04 Mar 2025 21:30) (87/47 - 178/82)  BP(mean): 88 (04 Mar 2025 21:30) (61 - 115)  ABP: 103/32 (05 Mar 2025 08:45) (84/50 - 128/61)  ABP(mean): 53 (05 Mar 2025 08:45) (53 - 85)  RR: 22 (05 Mar 2025 08:45) (14 - 52)  SpO2: 100% (05 Mar 2025 08:45) (51% - 100%)    O2 Parameters below as of 05 Mar 2025 08:45  Patient On (Oxygen Delivery Method): ventilator    O2 Concentration (%): 40      Mode: AC/ CMV (Assist Control/ Continuous Mandatory Ventilation)  RR (machine): 22  TV (machine): 500  FiO2: 40  PEEP: 6  ITime: 1  MAP: 15  PIP: 23    Adult Advanced Hemodynamics Last 24 Hrs  CVP(mm Hg): --  CVP(cm H2O): --  CO: --  CI: --  PA: --  PA(mean): --  PCWP: --  SVR: --  SVRI: --  PVR: --  PVRI: --  CAPILLARY BLOOD GLUCOSE      POCT Blood Glucose.: 206 mg/dL (05 Mar 2025 06:23)  POCT Blood Glucose.: 221 mg/dL (05 Mar 2025 00:42)  POCT Blood Glucose.: 179 mg/dL (04 Mar 2025 17:01)  POCT Blood Glucose.: 140 mg/dL (04 Mar 2025 11:27)    CAPILLARY BLOOD GLUCOSE      POCT Blood Glucose.: 206 mg/dL (05 Mar 2025 06:23)    I&O's Summary    04 Mar 2025 07:01  -  05 Mar 2025 07:00  --------------------------------------------------------  IN: 2750.9 mL / OUT: 200 mL / NET: 2550.9 mL    05 Mar 2025 07:01  -  05 Mar 2025 09:05  --------------------------------------------------------  IN: 246 mL / OUT: 0 mL / NET: 246 mL      Daily     Daily     PHYSICAL EXAMINATION:  General: intubated  HEENT: Moist mucous membranes.  Respiratory: intubated, reduced breath sounds R-sided base  CV: tachycardic rate 140bpm, S1S2, no murmurs, rubs or gallops. No JVD. Distal pulses intact.  Abdominal: soft, distended, no rebound or guarding, normal bowel sounds.  Neurology: AOx0, pupils 7mm b/l, fixed and dilated, nonreactive  Extremities: No pitting edema, + Peripheral pulses.  Cath site:   Tubes:    LABS:  ABG - ( 05 Mar 2025 00:47 )  pH, Arterial: 7.34  pH, Blood: x     /  pCO2: 35    /  pO2: 108   / HCO3: 19    / Base Excess: -6.1  /  SaO2: 98.0                                    10.1   40.09 )-----------( 418      ( 05 Mar 2025 00:51 )             32.2     03-05    140  |  101  |  40[H]  ----------------------------<  238[H]  4.0   |  17[L]  |  3.24[H]    Ca    8.2[L]      05 Mar 2025 00:51  Phos  5.3     03-05  Mg     2.5     03-05    TPro  5.2[L]  /  Alb  2.6[L]  /  TBili  0.8  /  DBili  x   /  AST  377[H]  /  ALT  142[H]  /  AlkPhos  124[H]  03-05    LIVER FUNCTIONS - ( 05 Mar 2025 00:51 )  Alb: 2.6 g/dL / Pro: 5.2 g/dL / ALK PHOS: 124 U/L / ALT: 142 U/L / AST: 377 U/L / GGT: x           PT/INR - ( 05 Mar 2025 00:51 )   PT: 13.2 sec;   INR: 1.16 ratio         PTT - ( 05 Mar 2025 00:51 )  PTT:34.6 sec        Urinalysis Basic - ( 05 Mar 2025 00:51 )    Color: x / Appearance: x / SG: x / pH: x  Gluc: 238 mg/dL / Ketone: x  / Bili: x / Urobili: x   Blood: x / Protein: x / Nitrite: x   Leuk Esterase: x / RBC: x / WBC x   Sq Epi: x / Non Sq Epi: x / Bacteria: x        TELEMETRY:     EKG:     IMAGING:

## 2025-03-05 NOTE — PROGRESS NOTE ADULT - ASSESSMENT
73M w/ HTN, HLD, nonischemic cardiomyopathy, chronic systolic heart failure s/p HM2 LVAD (6/2017), s/p heart transplant from Hep. 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 Admitted with septic shock w/ pseudomonus bacteremia.    NORMAN on CKD3 iso septic shock w/ pseudomonus bacteremia, now requiring dialysis.  [facility] Scr ranged from 1.3-2.1 in 2023. Most recent Scr was 1.75 on 1/30/24. On admission Scr 1.92 (2/19), worsened to 6.89 (2/23). Required CRRT 2/23-3/3 iso worsening oliguric renal failure w/ hemodynamic instability. CRRT stopped on 3/3 in preparation to transition to iHD. However pt w/ PEA arrest overnight, now intubated and w/ high IV vasopressor requirements. Labs from today reviewed. Electrolytes within acceptable range. Pt remains anuric. No urgency to restart CRRT at this moment, will reassess and resume as tolerated/needed. Monitor labs, daily wts and I/Os. Immunosuppression as per primary team. Prognosis is guarded.     If you have any questions, please feel free to contact me  Boston Caballero  Nephrology Fellow  [employer]/Page [phone]  (After 5pm or on weekends please page the on-call fellow) 73M w/ HTN, HLD, nonischemic cardiomyopathy, chronic systolic heart failure s/p HM2 LVAD (6/2017), s/p heart transplant from Hep. 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 Admitted with septic shock w/ pseudomonus bacteremia.    NORMAN on CKD3 iso septic shock w/ pseudomonus bacteremia, now requiring dialysis.  [facility] Scr ranged from 1.3-2.1 in 2023. Most recent Scr was 1.75 on 1/30/24. On admission Scr 1.92 (2/19), worsened to 6.89 (2/23). Required CRRT 2/23-3/3 iso worsening oliguric renal failure w/ hemodynamic instability. CRRT stopped on 3/3 in preparation to transition to iHD. However pt w/ PEA arrest overnight, now intubated and w/ high IV vasopressor requirements. Labs from today reviewed. Electrolytes within acceptable range. Pt remains anuric. No urgency to restart CRRT at this moment, will reassess and resume as tolerated/needed. Start sodium bicarb 650mg tid. Monitor labs, daily wts and I/Os. Immunosuppression as per primary team. Prognosis is guarded.     If you have any questions, please feel free to contact me  Boston Caballero  Nephrology Fellow  [place]/Page [phone]  (After 5pm or on weekends please page the on-call fellow)

## 2025-03-06 NOTE — PROGRESS NOTE ADULT - SUBJECTIVE AND OBJECTIVE BOX
*************************************  NEUROLOGY PROGRESS NOTE  **************************************    BILLY RUSSELL  Male  MRN-[id number]    Subjective: Patient seen and examined at bedside with Neurology team and attending     Interval History:    patient was being followed by our team for AMS in the setting of multifactorial encephalopathy i.e. NORMAN/sepsis among other active metabolic problems. Patient had cardiac arrest for 30 minutes 3/4/25 and we were contacted for re-evaluation and prognostication. Patient was being monitored for AMS with active work up per primary team until he arrested.       VITAL SIGNS:  Vital Signs Last 24 Hrs  T(C): 35.8 (06 Mar 2025 12:00), Max: 40.4 (06 Mar 2025 04:00)  T(F): 96.5 (06 Mar 2025 12:00), Max: 104.7 (06 Mar 2025 04:00)  HR: 122 (06 Mar 2025 12:30) (120 - 163)  BP: --  BP(mean): --  RR: 16 (06 Mar 2025 12:30) (16 - 26)  SpO2: 98% (06 Mar 2025 12:30) (94% - 100%)    Parameters below as of 06 Mar 2025 12:00  Patient On (Oxygen Delivery Method): ventilator    O2 Concentration (%): 40    PHYSICAL EXAMINATION:      Neurologic Exam:  Mental status - intubated off sedation comatose  nonresponsive to verbal stimuli, nonresponsive to painful stimuli including sternal rub.    Cranial nerves - Pupils nonreactive but surgical bilaterally, there is no corneal reflex present bilaterally, there is disconjugate gaze. there is no OCR present in right eye but there is minimal left eye OCR response.     Motor - there is flaccid tone throughout. There is no movement in either of the 4 limbs despite painful nailbed pressure.    Sensation - no response to nailbed pressure.    DTR's -             Biceps      Triceps     Brachioradialis      Patellar    Ankle    Toes/plantar response  R             0            0                  1+                       0            0                 Down  L              0            0                 1+                        0           0                 Down    Coordination - GEGE    Gait and station - GEGE      LABS:    CBC                       9.1    32.30 )-----------( 379      ( 06 Mar 2025 00:44 )             29.2     Chem 03-06    141  |  105  |  57[H]  ----------------------------<  163[H]  5.0   |  17[L]  |  5.11[H]    Ca    7.5[L]      06 Mar 2025 00:44  Phos  5.6     03-06  Mg     2.3     03-06    TPro  4.4[L]  /  Alb  2.2[L]  /  TBili  0.8  /  DBili  x   /  AST  3290[H]  /  ALT  610[H]  /  AlkPhos  87  03-06    LFTs LIVER FUNCTIONS - ( 06 Mar 2025 00:44 )  Alb: 2.2 g/dL / Pro: 4.4 g/dL / ALK PHOS: 87 U/L / ALT: 610 U/L / AST: 3290 U/L / GGT: x           Coagulopathy PT/INR - ( 06 Mar 2025 00:44 )   PT: 24.5 sec;   INR: 2.15 ratio         PTT - ( 06 Mar 2025 00:44 )  PTT:31.5 sec  Lipid Panel 02-20 Chol 95 LDL -- HDL 55 Trig 65  A1c   Cardiac enzymes     U/A Urinalysis Basic - ( 06 Mar 2025 00:44 )    Color: x / Appearance: x / SG: x / pH: x  Gluc: 163 mg/dL / Ketone: x  / Bili: x / Urobili: x   Blood: x / Protein: x / Nitrite: x   Leuk Esterase: x / RBC: x / WBC x   Sq Epi: x / Non Sq Epi: x / Bacteria: x      CSF  Immunological  Other      RADIOLOGY & ADDITIONAL STUDIES:        < from: CT Head No Cont (03.05.25 @ 09:33) >  IMPRESSION: No evidence of acute hemorrhage mass or mass effect.      < end of copied text >

## 2025-03-06 NOTE — PROGRESS NOTE ADULT - ASSESSMENT
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 Patient experienced cardiac arrest 3/4/25 that persisted for 30 minutes. On exam, patient comatose and nonresponsive despite being off sedation.     Impression: comatose post cardiac arrest likely guarded prognosis until further work up obtained to further comment on neuroprognostication  after last evaluation patient continued to be altered and metabolically active until he experienced cardiac arrest     plan:  [ ] continuous vEEG  [ ] NSE today then tomorrow evening  [ ] MRI brain without contrast 3/9/25  [ ] continue with our previous recommendations, e.g: anticoagulation for right cerebellar stroke i/s/o afib if no contraindication per primary team    case seen with Dr. Moreau

## 2025-03-06 NOTE — PROGRESS NOTE ADULT - CRITICAL CARE ATTENDING COMMENT
Interval History as per resident note, personally verified by me. Patient previously seen by us during this hospital admission for confusion and R cerebellar stroke. He was showing improvement but not back to baseline. Unfortunately, patient had a cardiac arrest on 3/4 ~22:00 with total time until ROSC ~30 minutes but also had intubation in esophagus before this was corrected. He was intubated and briefly given sedation but then this was stopped. No focal neurologic deficits or abnormal movements noted. He remains unresponsive off sedation. CCU team has noted overall poor prognosis from medical standpoint and have capped his medical care.    GTT:  Norepinephrine 0.46 mcg/kg/min    ROS: Due to clinical condition unable to assess (GEGE)    Gen - Intubated, not sedated, EEG at bedside  CV - Peripheral circulation intact, BUE edema and bandages noted  Eyes - Fundoscopy not well visualized    Neurologic exam:  MS - Intubated, not sedated, comatose, no speech output, does not follow commands. GEGE orientation, rep/naming, attn/conc/recent and remote memory/fund of knowledge  CN - Pupils surgical and not reactive b/l, EOM's with minimal L eye movement by OCR, (-) face sens/str by corneals b/l, (-) spontaneous respirations (vent rate 16 bpm), (-) cough. GEGE VF, hearing, tongue/palate, trap/SCM  Motor - Normal bulk and flaccid tone throughout. No spontaneous movements noted. No grimace or withdrawal to strong tactile stimuli in any extremity  Sens - As per Motor above  DTR's - 2+ BR b/l, 0+ rest, and neutral b/l plantar response  Coord - GEGE  Gait and station - GEGE    Pertinent labs/studies:  CBC with inc WBC 32.90, otherwise essentially WNL  BMP with inc BUN/Cr 57/5.11 (GFR dec 11), otherwise essentially WNL  Albumin dec 2.2, LFT's with inc ALT//3290  NH3 WNL, A1C inc 5.8%, B12/folate WNL, WNV (-), B6 not able to calculated, Lyme (-), syphilis serology TP (-)  Tacrolimus level - 6.9    < from: CT Head No Cont (03.05.25 @ 09:33) >    No evidence of acute hemorrhage mass or mass effect.    < end of copied text >      A/P:  Anoxic encephalopathy  HTN  Nicole syndrome s/p heart transplant 2018  Atrial fibrillation/flutter on Eliquis  Gout  R cerebellar stroke  Leukocytosis  NORMAN  Transaminitis    - Patient initially with R cerebellar stroke and encephalopathy of unclear etiology for possible due to cardiac event or other toxic/metabolic causes. He had a cardiac arrest on 3/4 at ~22:00 with total time until ROSC approximately 30 minutes but also initial esophageal intubation until this was corrected. Briefly given sedation but this was stopped. No abnormal movements or focal neurologic deficits noted. He is connected to vEEG at this time with diffuse attenuation and likely artifact but await official read. CT head, personally reviewed by me, with anterior inferior > superior sulcal effacement and blurring of gray-white junction likely reflecting hypoxic-anoxic damage but no other acute intracranial findings  - vEEG to evaluate for focal slowing, epileptiform discharges, or seizures  - Consider MRI brain w/o on 3/9 to better visualize hypoxic/anoxic damage, if in line with GOC  - Neuron specific enolase (NSE) to aid in neurologic prognosis on 3/6 PM (24-48 hours s/p arrest) and 3/7 PM (48-78 hours s/p arrest)  - Continue to minimize/hold sedation as able (Precedex, fentanyl, hydromorphone OK)  - Above recommendations discussed with CCU team, who verbalized agreement and understanding  - Continue to address above medical issues, as you are doing  - Will continue to follow patient with you

## 2025-03-06 NOTE — PROGRESS NOTE ADULT - SUBJECTIVE AND OBJECTIVE BOX
Admission date:  CHIEF COMPLAINT:  HPI:  73yo M pmhx HTN, HLD, nonischemic cardiomyopathy, chronic systolic heart failure s/p HM2 LVAD (2017), s/p heart transplant from Hep. 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 On the phone, the patient's godbrother mentioned that on , the patient was in the car with his godbrother and was disoriented asking to get off on the road 4 blocks before his house. In addition, a caretaker stated that when she spoke to Mr. Hameed on the phone at 11am on , he was doing well. However, when the caretaker visited 2 hours later at 1pm, the patient was disoriented and felt his legs were heavy. This prompted the patient to come to the hospital.     In the ED: Hypothermic 91.4, HR 80, /60, RA   Patient noted to be Hyperkalemic (6.2) with Mixed respiratory and metabolic acidosis pH 7.2. - Patient was given Calcium Gluconate 2g, D50/Insulin 5 unit, 40 mg IV lasix, Lokelma 10mg.   Vancomycin and Zosyn  (2025 07:21)    INTERVAL HISTORY: Pt is DNR today     REVIEW OF SYSTEMS: unresponsive     MEDICATIONS  (STANDING):  atovaquone  Suspension 1500 milliGRAM(s) Oral daily  chlorhexidine 0.12% Liquid 15 milliLiter(s) Oral Mucosa every 12 hours  chlorhexidine 2% Cloths 1 Application(s) Topical daily  cholecalciferol 1000 Unit(s) Oral daily  ganciclovir IVPB 115 milliGRAM(s) IV Intermittent <User Schedule>  insulin lispro (ADMELOG) corrective regimen sliding scale   SubCutaneous every 6 hours  meropenem  IVPB 1000 milliGRAM(s) IV Intermittent every 24 hours  norepinephrine Infusion 0.7 MICROgram(s)/kG/Min (119 mL/Hr) IV Continuous <Continuous>  nystatin    Suspension 176616 Unit(s) Oral four times a day  pantoprazole  Injectable 40 milliGRAM(s) IV Push two times a day  polyethylene glycol 3350 17 Gram(s) Oral daily  predniSONE   Tablet 15 milliGRAM(s) Oral daily  senna 2 Tablet(s) Oral daily  sodium bicarbonate 650 milliGRAM(s) Oral every 8 hours    MEDICATIONS  (PRN):      Objective:  ICU Vital Signs Last 24 Hrs  T(C): 36.2 (06 Mar 2025 18:00), Max: 40.4 (06 Mar 2025 04:00)  T(F): 97.1 (06 Mar 2025 18:00), Max: 104.7 (06 Mar 2025 04:00)  HR: 133 (06 Mar 2025 20:15) (120 - 163)  BP: --  BP(mean): --  ABP: 97/58 (06 Mar 2025 20:15) (82/52 - 142/50)  ABP(mean): 67 (06 Mar 2025 20:15) (43 - 72)  RR: 16 (06 Mar 2025 20:15) (16 - 28)  SpO2: 97% (06 Mar 2025 20:15) (96% - 100%)    O2 Parameters below as of 06 Mar 2025 19:00  Patient On (Oxygen Delivery Method): ventilator    O2 Concentration (%): 40            03-05 @ 07:  -  06 @ 07:00  --------------------------------------------------------  IN: 3297.5 mL / OUT: 0 mL / NET: 3297.5 mL     @ 07:  -  06 @ 21:24  --------------------------------------------------------  IN: 988.9 mL / OUT: 0 mL / NET: 988.9 mL      Daily     Daily Weight in k.5 (06 Mar 2025 05:00)    PHYSICAL EXAMINATION:  Constitutional: laying in bed, arousable  HEENT: NC/AT, PERRLA, EOMI, no conjunctival pallor or scleral icterus, MMM  Neck: Supple, no JVD  Respiratory: CTA B/L. No w/r/r.   Cardiovascular: regular rate, normal S1 and S2, no m/r/g.   Gastrointestinal: mildly distended, +BS, nontender, no guarding or rebound tenderness, no palpable masses  Extremities: RUE edematous; no cyanosis, clubbing.  Neurological: unresponsive  Skin: approx. 3x3cm area of hyperpigmentation and swelling to R parasternal upper chest wall, no fluctuance no open wounds.        TELEMETRY:     EKG:     IMAGING:    Labs:  ABG - ( 06 Mar 2025 00:41 )  pH, Arterial: 7.32  pH, Blood: x     /  pCO2: 35    /  pO2: 86    / HCO3: 18    / Base Excess: -7.4  /  SaO2: 96.2                                    9.1    32.30 )-----------( 379      ( 06 Mar 2025 00:44 )             29.2     03-06    141  |  105  |  57[H]  ----------------------------<  163[H]  5.0   |  17[L]  |  5.11[H]    Ca    7.5[L]      06 Mar 2025 00:44  Phos  5.6     03-06  Mg     2.3     03-06    TPro  4.4[L]  /  Alb  2.2[L]  /  TBili  0.8  /  DBili  x   /  AST  3290[H]  /  ALT  610[H]  /  AlkPhos  87  03-06    LIVER FUNCTIONS - ( 06 Mar 2025 00:44 )  Alb: 2.2 g/dL / Pro: 4.4 g/dL / ALK PHOS: 87 U/L / ALT: 610 U/L / AST: 3290 U/L / GGT: 71 U/L       PT/INR - ( 06 Mar 2025 00:44 )   PT: 24.5 sec;   INR: 2.15 ratio         PTT - ( 06 Mar 2025 00:44 )  PTT:31.5 sec    Urinalysis Basic - ( 06 Mar 2025 00:44 )    Color: x / Appearance: x / SG: x / pH: x  Gluc: 163 mg/dL / Ketone: x  / Bili: x / Urobili: x   Blood: x / Protein: x / Nitrite: x   Leuk Esterase: x / RBC: x / WBC x   Sq Epi: x / Non Sq Epi: x / Bacteria: x        HEALTH ISSUES - PROBLEM Dx:  Acute respiratory acidosis    Hyperkalemia    Stage 3 chronic kidney disease    Metabolic encephalopathy    Systemic inflammatory response syndrome (SIRS)    Need for prophylactic measure    H/O heart transplant    AMS (altered mental status)    NORMAN (acute kidney injury)    Status post heart transplant    Immunosuppression    Prophylactic antibiotic    Deep vein thrombosis (DVT)    Hypertension    DM (diabetes mellitus)    Anemia, hemolytic    Swollen arm    Hemolytic anemia    Paroxysmal atrial fibrillation    Diabetes type 2    Acute kidney injury superimposed on CKD    Vascular disorder of lower extremity    Coffee ground emesis    Ileus    Septic shock      ====================ASSESSMENT ==============  73yo M w/ pmhx HTN, HLD, nonischemic cardiomyopathy, chronic systolic heart failure s/p HM2 LVAD (2017), s/p heart transplant from Hep. 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 Found to have septic shock, ( BCx pseudo,  BCx NGTD). Still episodes of hypotension. Worsening NORMAN. Accepted to MICU for CRRT.    Plan:  ====================== NEUROLOGY=====================  # Metabolic encephalopathy  - Likely multi-factorial etiologies: septic vs uremic vs hypercapnic, less likely meningitis  - Patient baseline AOx3. AOx1 at ED arrival, iso of infection vs metabolic derangements  - CTH and angio w/o hemorrhage or stenosis   - Fall precautions  - spot EEG : negative for seizures   - Currently unresponsive s/p cardiac arrest  - off precedex for 24hrs   -CTH today was negative for acute changes     ==================== RESPIRATORY======================  Acute respiratory failure  -s/p cardiac arrest yesterday, intubated   -ETT to vent with Volume AC, 500//40%   -Trending ABGs     ====================CARDIOVASCULAR==================  # Septic shock from pseudomonas bacteremia  - POCUS reveals no evidence of  fluid overload  - s/p fluid resuscitation  - restarted on levophed gtt post cardiac arrest, capped at 0.7 with no escalation     # h/o heart transplant  - Nonischemic cardiomyopathy, chronic systolic heart failure s/p HM2 LVAD (2017), s/p heart transplant from Hep. C donor (treated) 18   - c/w tacro w/ daily levels  - Intolerant of Cellcept due to leukopenia  - c/w solucortef  - c/w Atovaquone for ppx  - c/w nystatin for ppx  - c/w ASA    # Paroxysmal atrial fibrillation.   - Home regimen : Eliquis 5 mg PO BID at home for hx of of afib and IJ thrombi  - EKG on admission - NSR  - c/w heparin gtt    # Hypertension (chronic)  - Holding home Losartan 75 mg PO QD iso shock    # Vascular disorder of lower extremity   - Extremities appear cool and dry   - c/w local wound care by podiatry   - Podiatry recommendations - pending SHANEKA studies, consider vascular evaluation if abnormal    ===================HEMATOLOGIC/ONC ===================  # Hx of hemolytic anemia, Nicole Syndrome  - Follows with Dr. Rosario as outpatient  - Prednisone 15 mg PO QD --> switched to Solu-cortef 75mg IV q8hr > back to prednisone 3/3  - f/u hematology/oncology recs  - Monitor H&H daily  - f/u hemolysis labs and peripheral smear    # DVT ppx  - Heparin gtt    ===================== RENAL =========================  # NORMAN on CKD  # ATN 2/2 septic shock  - Scr ranged from 1.3-2.1 in . Most recent Scr was 1.75 on 24. On admission, Scr was 1.9 >>> 6  - No improvement with IVF, likely ATN i/s/o contrast/medications  - CRRT stopped 3/3 8pm, plan for iHD  -no plans for further HD, not offered by nephro at this time   - Nephro following    # Hyperkalemia (resolved)   - K 6.2 at ED arrival  - Shifted in the ED, no EKG changes consistent with severe hyper K. Cr not significantly elevated form baseline, c/f RTA from bactrim and Tacro. Stable   - s/p Lokelma 10mg BID for 3 days  - CRRT as above    ==================== GASTROINTESTINAL===================  # Ileus, resolved  #+/- coffee ground emesis, resolved  - 1 episode of coffee ground emesis, unclear if true GIB  - CTAP () ileus vs partial SBO - cannot r/o GI bleed  - c/w PPI IV 40 BID   - c/w trickle feeds > TFs advanced 3/4  - Having bowel movements    =======================    ENDOCRINE  =====================  # DMT2  - A1c 5.8  - ISS    ========================INFECTIOUS DISEASE================  # Septic shock   # Hypothermia  # Bacteremia  - Presented with T 91.4F, WBC 21 concerning for possible occult infection   - U/A without LE or Nitrites, CXR without clear consolidation, MRSA negative Lower concern for meningitis at this moment  - 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 No manifest signs of bowel ischemia. Interval worsening of right lower lobe and right middle lobe atelectasis secondary to elevated right hemidiaphragm when compared to prior CT dated 2023. Superimposed infection in the collapsed lungs is not excluded  - s/p meropenem (-)  - Hold off additional doses of Vancomycin (MRSA swab negative)  - c/w ganciclovir  - c/w meropenem  - c/w vanco  - c/w atovaquone and nystatin for ppx  - f/u infectious labs - Toxoplasma, EBV, Fungitell, Galactomannan, CMV, Cryptococcus, MRSA, ASCENCION virus, CMV, Crypto, ASCENCION virus  - f/u GI PCR, consider C. Diff testing   - Transplant ID recommendations   - Consider LP if MS not improving    Patient requires continuous monitoring with bedside rhythm monitoring, pulse ox monitoring, and intermittent blood gas analysis. Care plan discussed with ICU care team. Patient remained critical and at risk for life threatening decompensation.  Patient seen, examined and plan discussed with CCU team during rounds.     I have personally provided 35 minutes of critical care time excluding time spent on separate procedures, in addition to initial critical care time provided by the CICU Attending, Dr. Leigh.

## 2025-03-06 NOTE — PROGRESS NOTE ADULT - SUBJECTIVE AND OBJECTIVE BOX
PATIENT: BILLY RUSSELL, MRN: [id number]    CHIEF COMPLAINT: Patient is a 74y old  Male who presents with a chief complaint of Lethargy (05 Mar 2025 22:40)      INTERVAL HISTORY/OVERNIGHT EVENTS: No overnight events. Pt remains intubated, nonsedated, unresponsive to painful stimulus.    REVIEW OF SYSTEMS:    Constitutional:     [ ] negative [ ] fevers [ ] chills [ ] weight loss [ ] weight gain  HEENT:                  [ ] negative [ ] dry eyes [ ] eye irritation [ ] postnasal drip [ ] nasal congestion  CV:                         [ ] negative  [ ] chest pain [ ] orthopnea [ ] palpitations [ ] murmur  Resp:                     [ ] negative [ ] cough [ ] shortness of breath [ ] dyspnea [ ] wheezing [ ] sputum [ ] hemoptysis  GI:                          [ ] negative [ ] nausea [ ] vomiting [ ] diarrhea [ ] constipation [ ] abd pain [ ] dysphagia   :                        [ ] negative [ ] dysuria [ ] nocturia [ ] hematuria [ ] increased urinary frequency  Musculoskeletal: [ ] negative [ ] back pain [ ] myalgias [ ] arthralgias [ ] fracture  Skin:                       [ ] negative [ ] rash [ ] itch  Neurological:        [ ] negative [ ] headache [ ] dizziness [ ] syncope [ ] weakness [ ] numbness  Psychiatric:           [ ] negative [ ] anxiety [ ] depression  Endocrine:            [ ] negative [ ] diabetes [ ] thyroid problem  Heme/Lymph:      [ ] negative [ ] anemia [ ] bleeding problem  Allergic/Immune: [ ] negative [ ] itchy eyes [ ] nasal discharge [ ] hives [ ] angioedema    [ ] All other systems negative  [ X] Unable to assess ROS because ___AMS/Intubated_____.    MEDICATIONS:  MEDICATIONS  (STANDING):  atovaquone  Suspension 1500 milliGRAM(s) Oral daily  chlorhexidine 0.12% Liquid 15 milliLiter(s) Oral Mucosa every 12 hours  chlorhexidine 2% Cloths 1 Application(s) Topical daily  cholecalciferol 1000 Unit(s) Oral daily  ganciclovir IVPB 115 milliGRAM(s) IV Intermittent <User Schedule>  insulin lispro (ADMELOG) corrective regimen sliding scale   SubCutaneous every 6 hours  meropenem  IVPB 1000 milliGRAM(s) IV Intermittent every 24 hours  norepinephrine Infusion 0.7 MICROgram(s)/kG/Min (119 mL/Hr) IV Continuous <Continuous>  nystatin    Suspension 829282 Unit(s) Oral four times a day  pantoprazole  Injectable 40 milliGRAM(s) IV Push two times a day  polyethylene glycol 3350 17 Gram(s) Oral daily  predniSONE   Tablet 15 milliGRAM(s) Oral daily  senna 2 Tablet(s) Oral daily  sodium bicarbonate 650 milliGRAM(s) Oral every 8 hours  tacrolimus    0.5 mG/mL Suspension 3 milliGRAM(s) Oral <User Schedule>    MEDICATIONS  (PRN):      ALLERGIES: Allergies    No Known Allergies    Intolerances        OBJECTIVE:  ICU Vital Signs Last 24 Hrs  T(C): 36.8 (06 Mar 2025 09:00), Max: 40.4 (06 Mar 2025 04:00)  T(F): 98.3 (06 Mar 2025 09:00), Max: 104.7 (06 Mar 2025 04:00)  HR: 133 (06 Mar 2025 09:00) (133 - 163)  BP: --  BP(mean): --  ABP: 130/41 (06 Mar 2025 09:00) (5/-7 - 146/51)  ABP(mean): 64 (06 Mar 2025 09:00) (-18 - 79)  RR: 22 (06 Mar 2025 09:00) (16 - 26)  SpO2: 98% (06 Mar 2025 09:00) (93% - 100%)    O2 Parameters below as of 06 Mar 2025 09:00  Patient On (Oxygen Delivery Method): ventilator    O2 Concentration (%): 40      Mode: AC/ CMV (Assist Control/ Continuous Mandatory Ventilation)  RR (machine): 22  TV (machine): 500  FiO2: 40  PEEP: 6  ITime: 1  MAP: 13  PIP: 20    Adult Advanced Hemodynamics Last 24 Hrs  CVP(mm Hg): --  CVP(cm H2O): --  CO: --  CI: --  PA: --  PA(mean): --  PCWP: --  SVR: --  SVRI: --  PVR: --  PVRI: --  CAPILLARY BLOOD GLUCOSE      POCT Blood Glucose.: 147 mg/dL (06 Mar 2025 05:28)  POCT Blood Glucose.: 138 mg/dL (05 Mar 2025 23:07)  POCT Blood Glucose.: 138 mg/dL (05 Mar 2025 16:57)    CAPILLARY BLOOD GLUCOSE      POCT Blood Glucose.: 147 mg/dL (06 Mar 2025 05:28)    I&O's Summary    05 Mar 2025 07:01  -  06 Mar 2025 07:00  --------------------------------------------------------  IN: 3297.5 mL / OUT: 0 mL / NET: 3297.5 mL    06 Mar 2025 07:01  -  06 Mar 2025 09:26  --------------------------------------------------------  IN: 285.6 mL / OUT: 0 mL / NET: 285.6 mL      Daily     Daily Weight in k.5 (06 Mar 2025 05:00)    PHYSICAL EXAMINATION:  General: Intubated, unresponsive to painful stimulus  HEENT: Moist mucous membranes.  Respiratory: Intubated, reduced breath sounds R-sided lung fields.  CV: tachycardic rate 130s, S1S2, no murmurs, rubs or gallops. No JVD. Distal pulses intact.  Abdominal: Soft, nontender, nondistended, no rebound or guarding, normal bowel sounds.  Neurology: unresponsive, no response to painful stimulus  Extremities: (+) 2+ pitting edema b/l UE and LE, + Peripheral pulses.  Cath site:   Tubes:    LABS:  ABG - ( 06 Mar 2025 00:41 )  pH, Arterial: 7.32  pH, Blood: x     /  pCO2: 35    /  pO2: 86    / HCO3: 18    / Base Excess: -7.4  /  SaO2: 96.2                                    9.1    32.30 )-----------( 379      ( 06 Mar 2025 00:44 )             29.2     03-06    141  |  105  |  57[H]  ----------------------------<  163[H]  5.0   |  17[L]  |  5.11[H]    Ca    7.5[L]      06 Mar 2025 00:44  Phos  5.6     03-06  Mg     2.3     03-06    TPro  4.4[L]  /  Alb  2.2[L]  /  TBili  0.8  /  DBili  x   /  AST  3290[H]  /  ALT  610[H]  /  AlkPhos  87  -    LIVER FUNCTIONS - ( 06 Mar 2025 00:44 )  Alb: 2.2 g/dL / Pro: 4.4 g/dL / ALK PHOS: 87 U/L / ALT: 610 U/L / AST: 3290 U/L / GGT: x           PT/INR - ( 06 Mar 2025 00:44 )   PT: 24.5 sec;   INR: 2.15 ratio         PTT - ( 06 Mar 2025 00:44 )  PTT:31.5 sec        Urinalysis Basic - ( 06 Mar 2025 00:44 )    Color: x / Appearance: x / SG: x / pH: x  Gluc: 163 mg/dL / Ketone: x  / Bili: x / Urobili: x   Blood: x / Protein: x / Nitrite: x   Leuk Esterase: x / RBC: x / WBC x   Sq Epi: x / Non Sq Epi: x / Bacteria: x        TELEMETRY:     EKG:     IMAGING:    PATIENT: BILLY RUSSELL, MRN: [id number]    CHIEF COMPLAINT: Patient is a 74y old  Male who presents with a chief complaint of Lethargy (05 Mar 2025 22:40)      INTERVAL HISTORY/OVERNIGHT EVENTS: No overnight events. Pt remains intubated, nonsedated, unresponsive to painful stimulus.    REVIEW OF SYSTEMS:    Constitutional:     [ ] negative [ ] fevers [ ] chills [ ] weight loss [ ] weight gain  HEENT:                  [ ] negative [ ] dry eyes [ ] eye irritation [ ] postnasal drip [ ] nasal congestion  CV:                         [ ] negative  [ ] chest pain [ ] orthopnea [ ] palpitations [ ] murmur  Resp:                     [ ] negative [ ] cough [ ] shortness of breath [ ] dyspnea [ ] wheezing [ ] sputum [ ] hemoptysis  GI:                          [ ] negative [ ] nausea [ ] vomiting [ ] diarrhea [ ] constipation [ ] abd pain [ ] dysphagia   :                        [ ] negative [ ] dysuria [ ] nocturia [ ] hematuria [ ] increased urinary frequency  Musculoskeletal: [ ] negative [ ] back pain [ ] myalgias [ ] arthralgias [ ] fracture  Skin:                       [ ] negative [ ] rash [ ] itch  Neurological:        [ ] negative [ ] headache [ ] dizziness [ ] syncope [ ] weakness [ ] numbness  Psychiatric:           [ ] negative [ ] anxiety [ ] depression  Endocrine:            [ ] negative [ ] diabetes [ ] thyroid problem  Heme/Lymph:      [ ] negative [ ] anemia [ ] bleeding problem  Allergic/Immune: [ ] negative [ ] itchy eyes [ ] nasal discharge [ ] hives [ ] angioedema    [ ] All other systems negative  [ X] Unable to assess ROS because ___AMS/Intubated_____.    MEDICATIONS:  MEDICATIONS  (STANDING):  atovaquone  Suspension 1500 milliGRAM(s) Oral daily  chlorhexidine 0.12% Liquid 15 milliLiter(s) Oral Mucosa every 12 hours  chlorhexidine 2% Cloths 1 Application(s) Topical daily  cholecalciferol 1000 Unit(s) Oral daily  ganciclovir IVPB 115 milliGRAM(s) IV Intermittent <User Schedule>  insulin lispro (ADMELOG) corrective regimen sliding scale   SubCutaneous every 6 hours  meropenem  IVPB 1000 milliGRAM(s) IV Intermittent every 24 hours  norepinephrine Infusion 0.7 MICROgram(s)/kG/Min (119 mL/Hr) IV Continuous <Continuous>  nystatin    Suspension 309218 Unit(s) Oral four times a day  pantoprazole  Injectable 40 milliGRAM(s) IV Push two times a day  polyethylene glycol 3350 17 Gram(s) Oral daily  predniSONE   Tablet 15 milliGRAM(s) Oral daily  senna 2 Tablet(s) Oral daily  sodium bicarbonate 650 milliGRAM(s) Oral every 8 hours  tacrolimus    0.5 mG/mL Suspension 3 milliGRAM(s) Oral <User Schedule>    MEDICATIONS  (PRN):      ALLERGIES: Allergies    No Known Allergies    Intolerances        OBJECTIVE:  ICU Vital Signs Last 24 Hrs  T(C): 36.8 (06 Mar 2025 09:00), Max: 40.4 (06 Mar 2025 04:00)  T(F): 98.3 (06 Mar 2025 09:00), Max: 104.7 (06 Mar 2025 04:00)  HR: 133 (06 Mar 2025 09:00) (133 - 163)  BP: --  BP(mean): --  ABP: 130/41 (06 Mar 2025 09:00) (5/-7 - 146/51)  ABP(mean): 64 (06 Mar 2025 09:00) (-18 - 79)  RR: 22 (06 Mar 2025 09:00) (16 - 26)  SpO2: 98% (06 Mar 2025 09:00) (93% - 100%)    O2 Parameters below as of 06 Mar 2025 09:00  Patient On (Oxygen Delivery Method): ventilator    O2 Concentration (%): 40      Mode: AC/ CMV (Assist Control/ Continuous Mandatory Ventilation)  RR (machine): 22  TV (machine): 500  FiO2: 40  PEEP: 6  ITime: 1  MAP: 13  PIP: 20    Adult Advanced Hemodynamics Last 24 Hrs  CVP(mm Hg): --  CVP(cm H2O): --  CO: --  CI: --  PA: --  PA(mean): --  PCWP: --  SVR: --  SVRI: --  PVR: --  PVRI: --  CAPILLARY BLOOD GLUCOSE      POCT Blood Glucose.: 147 mg/dL (06 Mar 2025 05:28)  POCT Blood Glucose.: 138 mg/dL (05 Mar 2025 23:07)  POCT Blood Glucose.: 138 mg/dL (05 Mar 2025 16:57)    CAPILLARY BLOOD GLUCOSE      POCT Blood Glucose.: 147 mg/dL (06 Mar 2025 05:28)    I&O's Summary    05 Mar 2025 07:01  -  06 Mar 2025 07:00  --------------------------------------------------------  IN: 3297.5 mL / OUT: 0 mL / NET: 3297.5 mL    06 Mar 2025 07:01  -  06 Mar 2025 09:26  --------------------------------------------------------  IN: 285.6 mL / OUT: 0 mL / NET: 285.6 mL      Daily     Daily Weight in k.5 (06 Mar 2025 05:00)    PHYSICAL EXAMINATION:  General: Intubated, unresponsive to painful stimulus  HEENT: Moist mucous membranes.  Respiratory: Intubated, reduced breath sounds R-sided lung fields.  CV: tachycardic rate 130s, S1S2, no murmurs, rubs or gallops. No JVD. Distal pulses intact.  Abdominal: Soft, nontender, nondistended, no rebound or guarding, normal bowel sounds.  Neurology: unresponsive, no response to painful stimulus, R pupil 5mm nonreactive, L pupil 7mm nonreactive.  Extremities: (+) 2+ pitting edema b/l UE and LE, + Peripheral pulses.  Cath site:   Tubes:    LABS:  ABG - ( 06 Mar 2025 00:41 )  pH, Arterial: 7.32  pH, Blood: x     /  pCO2: 35    /  pO2: 86    / HCO3: 18    / Base Excess: -7.4  /  SaO2: 96.2                                    9.1    32.30 )-----------( 379      ( 06 Mar 2025 00:44 )             29.2     03-06    141  |  105  |  57[H]  ----------------------------<  163[H]  5.0   |  17[L]  |  5.11[H]    Ca    7.5[L]      06 Mar 2025 00:44  Phos  5.6     03-06  Mg     2.3     03-06    TPro  4.4[L]  /  Alb  2.2[L]  /  TBili  0.8  /  DBili  x   /  AST  3290[H]  /  ALT  610[H]  /  AlkPhos  87  03-06    LIVER FUNCTIONS - ( 06 Mar 2025 00:44 )  Alb: 2.2 g/dL / Pro: 4.4 g/dL / ALK PHOS: 87 U/L / ALT: 610 U/L / AST: 3290 U/L / GGT: x           PT/INR - ( 06 Mar 2025 00:44 )   PT: 24.5 sec;   INR: 2.15 ratio         PTT - ( 06 Mar 2025 00:44 )  PTT:31.5 sec        Urinalysis Basic - ( 06 Mar 2025 00:44 )    Color: x / Appearance: x / SG: x / pH: x  Gluc: 163 mg/dL / Ketone: x  / Bili: x / Urobili: x   Blood: x / Protein: x / Nitrite: x   Leuk Esterase: x / RBC: x / WBC x   Sq Epi: x / Non Sq Epi: x / Bacteria: x        TELEMETRY:     EKG:     IMAGING:

## 2025-03-06 NOTE — EEG REPORT - NS EEG TEXT BOX
BILLY RUSSELL [id number]     Study Date: 03-06-25 01:02-08:00  Duration: 6 hr 21 min  --------------------------------------------------------------------------------------------------  History:  CC/ HPI Patient is a 74y old  Male who presents with a chief complaint of Lethargy (06 Mar 2025 09:41)    MEDICATIONS  (STANDING):  atovaquone  Suspension 1500 milliGRAM(s) Oral daily  chlorhexidine 0.12% Liquid 15 milliLiter(s) Oral Mucosa every 12 hours  chlorhexidine 2% Cloths 1 Application(s) Topical daily  cholecalciferol 1000 Unit(s) Oral daily  ganciclovir IVPB 115 milliGRAM(s) IV Intermittent <User Schedule>  insulin lispro (ADMELOG) corrective regimen sliding scale   SubCutaneous every 6 hours  meropenem  IVPB 1000 milliGRAM(s) IV Intermittent every 24 hours  norepinephrine Infusion 0.7 MICROgram(s)/kG/Min (119 mL/Hr) IV Continuous <Continuous>  nystatin    Suspension 182092 Unit(s) Oral four times a day  pantoprazole  Injectable 40 milliGRAM(s) IV Push two times a day  polyethylene glycol 3350 17 Gram(s) Oral daily  predniSONE   Tablet 15 milliGRAM(s) Oral daily  senna 2 Tablet(s) Oral daily  sodium bicarbonate 650 milliGRAM(s) Oral every 8 hours  tacrolimus    0.5 mG/mL Suspension 3 milliGRAM(s) Oral <User Schedule>    --------------------------------------------------------------------------------------------------  Study Interpretation:    [[[Abbreviation Key:  PDR=alpha rhythm/posterior dominant rhythm. A-P=anterior posterior.  Amplitude: ‘very low’:<20; ‘low’:20-49; ‘medium’:; ‘high’:>150uV.  Persistence for periodic/rhythmic patterns (% of epoch) ‘rare’:<1%; ‘occasional’:1-10%; ‘frequent’:10-50%; ‘abundant’:50-90%; ‘continuous’:>90%.  Persistence for sporadic discharges: ‘rare’:<1/hr; ‘occasional’:1/min-1/hr; ‘frequent’:>1/min; ‘abundant’:>1/10 sec.  RPP=rhythmic and periodic patterns; GRDA=generalized rhythmic delta activity; FIRDA=frontal intermittent GRDA; LRDA=lateralized rhythmic delta activity; TIRDA=temporal intermittent rhythmic delta activity;  LPD=PLED=lateralized periodic discharges; GPD=generalized periodic discharges; BIPDs =bilateral independent periodic discharges; Mf=multifocal; SIRPDs=stimulus induced rhythmic, periodic, or ictal appearing discharges; BIRDs=brief potentially ictal rhythmic discharges >4 Hz, lasting .5-10s; PFA (paroxysmal bursts >13 Hz or =8 Hz <10s).  Modifiers: +F=with fast component; +S=with spike component; +R=with rhythmic component.  S-B=burst suppression pattern.  Max=maximal. N1-drowsy; N2-stage II sleep; N3-slow wave sleep. SSS/BETS=small sharp spikes/benign epileptiform transients of sleep. HV=hyperventilation; PS=photic stimulation]]]    Daily EEG Visual Analysis    FINDINGS:      Background:  The background is symmetric and diffusely suppressed < 10 uV.    Background Slowing:  Generalized slowing: As above  Focal slowing: None    State Changes:   Absent    Interictal Findings:  None    Electrographic and Electroclinical seizures:  None    Other Clinical Events:  None    Activation Procedures:   Hyperventilation is not performed.    Photic stimulation is performed and does not elicit any abnormalities.      Artifacts:  Intermittent myogenic and movement artifacts are present. Continuous EKG artifact.    Single-lead EKG: Regular rhythm at 160-170 bpm    EEG Classification / Summary:  Abnormal EEG in a comatose patient.  Diffusely suppressed background.  No focal or epileptiform abnormalities are captured.   No seizures.    Clinical Impression:  Severe diffuse cerebral dysfunction is nonspecific in etiology.   No epileptiform abnormalities or seizures.  Tachycardia on single-lead EKG.        -------------------------------------------------------------------------------------------------------    Alejandrina Wood MD  Attending Physician, [facility] [place]    -------------------------------------------------------------------------------------------------------    To reach EEG technologist:  Please use the pager number for the appropriate hospital or contact the .  At [facility] - Pager #: [phone]    To reach EEG-reading physician:  EEG Reading Room Phone #: [phone]  Epilepsy Answering Service after 5PM and before 8:30AM: Phone #: [phone]

## 2025-03-06 NOTE — PROGRESS NOTE ADULT - ASSESSMENT
73M w/ HTN, HLD, nonischemic cardiomyopathy, chronic systolic heart failure s/p HM2 LVAD (6/2017), s/p heart transplant from Hep. 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 Admitted with septic shock w/ pseudomonus bacteremia.    NORMAN on CKD3 iso septic shock w/ pseudomonus bacteremia, now requiring dialysis.  [facility]/sunrise reviewed. Scr ranged from 1.3-2.1 in 2023. Most recent Scr was 1.75 on 1/30/24. On admission Scr 1.92 (2/19), worsened to 6.89 (2/23). Required CRRT 2/23-3/3 iso worsening oliguric renal failure w/ hemodynamic instability. CRRT stopped on 3/3 in preparation to transition to iHD. However pt w/ PEA arrest overnight 3/4, now intubated and w/ high IV vasopressor requirements. Labs from today reviewed. BUN/Scr rising 57/5.11 today. Electrolytes within acceptable range. Pt remains anuric. Per discussion with primary team, pt off sedation and w/o mental status. Currently requiring high dose IV vasopressor and unlikely to tolerate CRRT/iHD at this time. When more stable and depending on GOC discussion will need to reconsider dialysis. Monitor labs, daily wts and I/Os. Immunosuppression as per primary team.     If you have any questions, please feel free to contact me  Boston Caballero  Nephrology Fellow  [place]/Page [phone]  (After 5pm or on weekends please page the on-call fellow)

## 2025-03-06 NOTE — PROGRESS NOTE ADULT - SUBJECTIVE AND OBJECTIVE BOX
[facility] Division of Kidney Diseases & Hypertension  FOLLOW UP NOTE  [phone]--------------------------------------------------------------------------------  Chief Complaint: NORMAN on CKD, now on CRRT    24 hour events/subjective: Pt seen and evaluated this morning in the ICU. Pt intubated on high dose IV vasopressor, now capped as per RN. Not on sedation however no mental status. Per RN no notable UOP.     PAST HISTORY  --------------------------------------------------------------------------------  No significant changes to PMH, PSH, FHx, SHx, unless otherwise noted    ALLERGIES & MEDICATIONS  --------------------------------------------------------------------------------  Allergies    No Known Allergies    Intolerances    Standing Inpatient Medications  atovaquone  Suspension 1500 milliGRAM(s) Oral daily  chlorhexidine 0.12% Liquid 15 milliLiter(s) Oral Mucosa every 12 hours  chlorhexidine 2% Cloths 1 Application(s) Topical daily  cholecalciferol 1000 Unit(s) Oral daily  ganciclovir IVPB 115 milliGRAM(s) IV Intermittent <User Schedule>  insulin lispro (ADMELOG) corrective regimen sliding scale   SubCutaneous every 6 hours  meropenem  IVPB 1000 milliGRAM(s) IV Intermittent every 24 hours  norepinephrine Infusion 0.7 MICROgram(s)/kG/Min IV Continuous <Continuous>  nystatin    Suspension 112305 Unit(s) Oral four times a day  pantoprazole  Injectable 40 milliGRAM(s) IV Push two times a day  polyethylene glycol 3350 17 Gram(s) Oral daily  predniSONE   Tablet 15 milliGRAM(s) Oral daily  senna 2 Tablet(s) Oral daily  sodium bicarbonate 650 milliGRAM(s) Oral every 8 hours  tacrolimus    0.5 mG/mL Suspension 3 milliGRAM(s) Oral <User Schedule>    PRN Inpatient Medications    REVIEW OF SYSTEMS  --------------------------------------------------------------------------------  see above    VITALS/PHYSICAL EXAM  --------------------------------------------------------------------------------  T(C): 36.8 (03-06-25 @ 09:00), Max: 40.4 (03-06-25 @ 04:00)  HR: 128 (03-06-25 @ 09:30) (128 - 163)  BP: --  RR: 22 (03-06-25 @ 09:30) (16 - 26)  SpO2: 98% (03-06-25 @ 09:30) (93% - 100%)  Wt(kg): --    03-05-25 @ 07:01  -  03-06-25 @ 07:00  --------------------------------------------------------  IN: 3297.5 mL / OUT: 0 mL / NET: 3297.5 mL    03-06-25 @ 07:01  -  03-06-25 @ 09:41  --------------------------------------------------------  IN: 285.6 mL / OUT: 0 mL / NET: 285.6 mL    Physical Exam:  Gen: ill-appearing  HEENT: +ETT  Pulm: CTA B/L  CV: S1S2  Abd: Soft, +BS   Ext: No LE edema B/L  Neuro: Off sedation and w/o mental status, no response to verbal or tactile stimuli   Skin: Warm and dry  Dialysis access: [facility]    LABS/STUDIES  --------------------------------------------------------------------------------              9.1    32.30 >-----------<  379      [03-06-25 @ 00:44]              29.2     141  |  105  |  57  ----------------------------<  163      [03-06-25 @ 00:44]  5.0   |  17  |  5.11        Ca     7.5     [03-06-25 @ 00:44]      Mg     2.3     [03-06-25 @ 00:44]      Phos  5.6     [03-06-25 @ 00:44]    TPro  4.4  /  Alb  2.2  /  TBili  0.8  /  DBili  x   /  AST  3290  /  ALT  610  /  AlkPhos  87  [03-06-25 @ 00:44]    PT/INR: PT 24.5 , INR 2.15       [03-06-25 @ 00:44]  PTT: 31.5       [03-06-25 @ 00:44]    Creatinine Trend:  SCr 5.11 [03-06 @ 00:44]  SCr 3.95 [03-05 @ 11:00]  SCr 3.24 [03-05 @ 00:51]  SCr 1.87 [03-04 @ 01:05]  SCr 1.40 [03-03 @ 01:12]    HBsAb <3.0      [03-05-25 @ 06:51]  HBsAg Nonreact      [03-05-25 @ 06:51]  HBcAb Nonreact      [03-05-25 @ 06:51]

## 2025-03-06 NOTE — PROGRESS NOTE ADULT - ASSESSMENT
====================ASSESSMENT ==============  73yo M w/ pmhx HTN, HLD, nonischemic cardiomyopathy, chronic systolic heart failure s/p HM2 LVAD (6/2017), s/p heart transplant from Hep. 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 Found to have septic shock, (2/19 BCx pseudo, 2/22 BCx NGTD). Still episodes of hypotension. Worsening NORMAN. Accepted to MICU for CRRT.    Plan:  ====================== NEUROLOGY=====================  # Metabolic encephalopathy  - Likely multi-factorial etiologies: septic vs uremic vs hypercapnic, less likely meningitis  - Patient baseline AOx3. AOx1 at ED arrival, iso of infection vs metabolic derangements  - CTH and angio in ED w/o hemorrhage or stenosis   - spot EEG 2/21: negative for seizures  - s/p PEA arrest (3/4) with approx. 30min downtime  - Currently A&Ox0  - Neuro following  - Monitor mental status per protocol  - s/p precedex gtt 1.5 for agitation (off 3/4)  - s/p seroquel 25mg PO q6hr per psych recs, (off 3/5)    ==================== RESPIRATORY======================  - intubated (3/4) s/p cardiac arrest  - 400/6/22/40%  - CXR with R-sided moderate effusion (3/5)    ====================CARDIOVASCULAR==================  # Septic shock from pseudomonas bacteremia  - POCUS reveals no evidence of  fluid overload  - s/p fluid resuscitation  - Lactate WNL    #PEA arrest (3/4)  - approx. 30min downtime  - Pressors: levo 0.7 (3/4- )    # h/o heart transplant  - Nonischemic cardiomyopathy, chronic systolic heart failure s/p HM2 LVAD (6/2017), s/p heart transplant from Hep. C donor (treated) 2/23/18   - c/w tacro w/ daily levels  - Intolerant of Cellcept due to leukopenia  - c/w home prednisone 15mg daily  - c/w Atovaquone for ppx  - c/w nystatin for ppx  - hold home ASA    # Paroxysmal atrial fibrillation.   - Home regimen : Eliquis 5 mg PO BID at home for hx of of afib and IJ thrombi  - EKG on admission - NSR  - hold lopressor 25 BID (3/4)  - c/w heparin gtt    # Hypertension (chronic)  - Holding home Losartan 75 mg PO QD iso shock    # Vascular disorder of lower extremity   - Extremities appear cool and dry   - c/w local wound care by podiatry     ===================HEMATOLOGIC/ONC ===================  # Hx of hemolytic anemia, Sanchez Syndrome  - Follows with Dr. Rosario as outpatient  - Prednisone 15 mg PO QD --> switched to Solu-cortef 75mg IV q8hr (2/26-3/3) > back to prednisone 15mg (3/3)  - f/u hematology/oncology recs  - Monitor H&H daily  - f/u hemolysis labs and peripheral smear    # DVT ppx  - Heparin gtt for Afib    ===================== RENAL =========================  # NORMAN on CKD  # ATN 2/2 septic shock  - Scr ranged from 1.3-2.1 in 2023. Most recent Scr was 1.75 on 1/30/24. On admission, Scr was 1.9 >>> 6  - No improvement with IVF, likely ATN i/s/o contrast/medications  - CRRT stopped 3/3 8pm  - trial bumex 4mg IV x1 (3/4) -> no output, bladder scan 3/5 with 180cc urine  - pt on levo s/p cardiac arrest 3/4, likely unable to tolerate HD/CRRT  - started sodium bicarb 650mg TID (3/5)  - Nephro following    # Hyperkalemia (resolved)   - K 6.2 at ED arrival  - Shifted in the ED, no EKG changes consistent with severe hyper K. Cr not significantly elevated form baseline, c/f RTA from bactrim and Tacro. Stable   - s/p Lokelma 10mg BID for 3 days  - dialysis as above    ==================== GASTROINTESTINAL===================  # Ileus, resolved  #+/- coffee ground emesis, resolved  - 1 episode of coffee ground emesis, unclear if true GIB  - CTAP (2/22) ileus vs partial SBO - cannot r/o GI bleed  - c/w PPI IV 40 BID  - Having bowel movements  - feeds held 3/4 s/p cardiac arrest    =======================    ENDOCRINE  =====================  # DMT2  - A1c 5.8  - ISS    ========================INFECTIOUS DISEASE================  # Septic shock   # Hypothermia  # bacteremia  - Presented with T 91.4F, WBC 21 concerning for possible occult infection,   - U/A without LE or Nitrites, CXR without clear consolidation, MRSA negative Lower concern for meningitis at this moment  - 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 No manifest signs of bowel ischemia. Interval worsening of right lower lobe and right middle lobe atelectasis secondary to elevated right hemidiaphragm when compared to prior CT dated 7/11/2023. Superimposed infection in the collapsed lungs is not excluded  - meropenem (2/21-2/24), restarted meropenem for CNS coverage (2/28 - )  - vancomycin for CNS coverage (2/28 - )  - s/p zosyn (2/24 - 2/28)  - MRSA swab negative  - hold home Valganciclovir for ppx --> switch to ganciclovir ppx  - f/u infectious labs - Toxoplasma, EBV, Fungitell, Galactomannan, CMV, Cryptococcus, MRSA, ASCENCION virus, CMV, Crypto, ASCENCION virus  - f/u GI PCR, consider C. Diff testing   - Transplant ID recommendations   - Consider LP if MS not improving - holding ASA for possible LP this week  - Warm blanket   ====================ASSESSMENT ==============  75yo M w/ pmhx HTN, HLD, nonischemic cardiomyopathy, chronic systolic heart failure s/p HM2 LVAD (6/2017), s/p heart transplant from Hep. 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 Found to have septic shock, (2/19 BCx pseudo, 2/22 BCx NGTD). Still episodes of hypotension. Worsening NORMAN. Accepted to MICU for CRRT.    Plan:  ====================== NEUROLOGY=====================  # Metabolic encephalopathy  - Likely multi-factorial etiologies: septic vs uremic vs hypercapnic, less likely meningitis  - Patient baseline AOx3. AOx1 at ED arrival, iso of infection vs metabolic derangements  - CTH and angio in ED w/o hemorrhage or stenosis   - spot EEG 2/21: negative for seizures  - s/p PEA arrest (3/4) with approx. 30min downtime  - Currently A&Ox0, unresponsive to pain,  -  - 24hr EEG 3/6: negative for seizures  - Neuro following  - Monitor mental status per protocol  - s/p precedex gtt 1.5 for agitation (off 3/4)  - s/p seroquel 25mg PO q6hr per psych recs, (off 3/5)    ==================== RESPIRATORY======================  - intubated (3/4) s/p cardiac arrest  - 400/6/22/40%  - CXR with R-sided moderate effusion (3/5)    ====================CARDIOVASCULAR==================  # Septic shock from pseudomonas bacteremia  - POCUS reveals no evidence of  fluid overload  - s/p fluid resuscitation  - Lactate WNL    #PEA arrest (3/4)  - approx. 30min downtime  - Pressors: levo 0.7 (3/4- )    # h/o heart transplant  - Nonischemic cardiomyopathy, chronic systolic heart failure s/p HM2 LVAD (6/2017), s/p heart transplant from Hep. C donor (treated) 2/23/18   - c/w tacro w/ daily levels  - Intolerant of Cellcept due to leukopenia  - c/w home prednisone 15mg daily  - c/w Atovaquone for ppx  - c/w nystatin for ppx  - hold home ASA    # Paroxysmal atrial fibrillation.   - Home regimen : Eliquis 5 mg PO BID at home for hx of of afib and IJ thrombi  - EKG on admission - NSR  - hold lopressor 25 BID (3/4)  - c/w heparin gtt    # Hypertension (chronic)  - Holding home Losartan 75 mg PO QD iso shock    # Vascular disorder of lower extremity   - Extremities appear cool and dry   - c/w local wound care by podiatry     ===================HEMATOLOGIC/ONC ===================  # Hx of hemolytic anemia, Sanchez Syndrome  - Follows with Dr. Rosario as outpatient  - Prednisone 15 mg PO QD --> switched to Solu-cortef 75mg IV q8hr (2/26-3/3) > back to prednisone 15mg (3/3)  - f/u hematology/oncology recs  - Monitor H&H daily  - f/u hemolysis labs and peripheral smear    # DVT ppx  - Heparin gtt for Afib    ===================== RENAL =========================  # NORMAN on CKD  # ATN 2/2 septic shock  - Scr ranged from 1.3-2.1 in 2023. Most recent Scr was 1.75 on 1/30/24. On admission, Scr was 1.9 >>> 6  - No improvement with IVF, likely ATN i/s/o contrast/medications  - CRRT stopped 3/3 8pm  - trial bumex 4mg IV x1 (3/4) -> no output, bladder scan 3/5 with 180cc urine  - pt on levo s/p cardiac arrest 3/4, likely unable to tolerate HD/CRRT  - started sodium bicarb 650mg TID (3/5)  - Nephro following    # Hyperkalemia (resolved)   - K 6.2 at ED arrival  - Shifted in the ED, no EKG changes consistent with severe hyper K. Cr not significantly elevated form baseline, c/f RTA from bactrim and Tacro. Stable   - s/p Lokelma 10mg BID for 3 days  - dialysis as above    ==================== GASTROINTESTINAL===================  # Ileus, resolved  #+/- coffee ground emesis, resolved  - 1 episode of coffee ground emesis, unclear if true GIB  - CTAP (2/22) ileus vs partial SBO - cannot r/o GI bleed  - c/w PPI IV 40 BID  - Having bowel movements  - feeds held 3/4 s/p cardiac arrest    =======================    ENDOCRINE  =====================  # DMT2  - A1c 5.8  - ISS    ========================INFECTIOUS DISEASE================  # Septic shock   # Hypothermia  # bacteremia  - Presented with T 91.4F, WBC 21 concerning for possible occult infection,   - U/A without LE or Nitrites, CXR without clear consolidation, MRSA negative Lower concern for meningitis at this moment  - 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 No manifest signs of bowel ischemia. Interval worsening of right lower lobe and right middle lobe atelectasis secondary to elevated right hemidiaphragm when compared to prior CT dated 7/11/2023. Superimposed infection in the collapsed lungs is not excluded  - meropenem (2/21-2/24), restarted meropenem for CNS coverage (2/28 - )  - vancomycin for CNS coverage (2/28 - )  - s/p zosyn (2/24 - 2/28)  - MRSA swab negative  - hold home Valganciclovir for ppx --> switch to ganciclovir ppx  - f/u infectious labs - Toxoplasma, EBV, Fungitell, Galactomannan, CMV, Cryptococcus, MRSA, ASCENCION virus, CMV, Crypto, ASCENCION virus  - f/u GI PCR, consider C. Diff testing   - Transplant ID recommendations   - Consider LP if MS not improving - holding ASA for possible LP this week  - Warm blanket   ====================ASSESSMENT ==============  73yo M w/ pmhx HTN, HLD, nonischemic cardiomyopathy, chronic systolic heart failure s/p HM2 LVAD (6/2017), s/p heart transplant from Hep. 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 Found to have septic shock, (2/19 BCx pseudo, 2/22 BCx NGTD). Still episodes of hypotension. Worsening NORMAN. Accepted to MICU for CRRT.    Plan:  ====================== NEUROLOGY=====================  # Metabolic encephalopathy  - Likely multi-factorial etiologies: septic vs uremic vs hypercapnic, less likely meningitis  - Patient baseline AOx3. AOx1 at ED arrival, iso of infection vs metabolic derangements  - CTH and angio in ED w/o hemorrhage or stenosis   - spot EEG 2/21: negative for seizures  - s/p PEA arrest (3/4) with approx. 30min downtime  - Currently A&Ox0, unresponsive to pain, pupils fixed and dilated  - CT head (3/5): negative for bleed or lesions  - 24hr EEG (3/6): negative for seizures  - febrile Tmax 104F overnight (3/5) -> tylenol PRN q6hr, cooling blanket  - Neuro following  - Monitor mental status per protocol  - s/p precedex gtt 1.5 for agitation (off 3/4)  - s/p seroquel 25mg PO q6hr per psych recs, (off 3/5)    ==================== RESPIRATORY======================  - intubated (3/4) s/p cardiac arrest  - 500/6/22/40%  - CXR with R-sided moderate effusion (3/5)    ====================CARDIOVASCULAR==================  # Septic shock from pseudomonas bacteremia  - POCUS reveals no evidence of  fluid overload  - s/p fluid resuscitation    #PEA arrest (3/4)  - approx. 30min downtime  - Pressors: levo 0.7 (3/4- )  - lactate 7.4, now downtrending    # h/o heart transplant  - Nonischemic cardiomyopathy, chronic systolic heart failure s/p HM2 LVAD (6/2017), s/p heart transplant from Hep. C donor (treated) 2/23/18   - c/w tacro w/ daily levels  - Intolerant of Cellcept due to leukopenia  - c/w home prednisone 15mg daily  - c/w Atovaquone for ppx  - c/w nystatin for ppx  - hold home ASA    # Paroxysmal atrial fibrillation.   - Home regimen : Eliquis 5 mg PO BID at home for hx of of afib and IJ thrombi  - EKG on admission - NSR  - hold lopressor 25 BID (3/4)  - s/p heparin gtt  - hold home ASA    # Hypertension (chronic)  - Holding home Losartan 75 mg PO QD iso shock    # Vascular disorder of lower extremity   - Extremities appear cool and dry   - c/w local wound care by podiatry     ===================HEMATOLOGIC/ONC ===================  # Hx of hemolytic anemia, Sanchez Syndrome  - Follows with Dr. Rosario as outpatient  - Prednisone 15 mg PO QD --> switched to Solu-cortef 75mg IV q8hr (2/26-3/3) > back to prednisone 15mg (3/3)  - f/u hematology/oncology recs  - Monitor H&H daily  - f/u hemolysis labs and peripheral smear    # DVT ppx: hold home ASA 81mg    ===================== RENAL =========================  # NORMAN on CKD  # ATN 2/2 septic shock  - Scr ranged from 1.3-2.1 in 2023. Most recent Scr was 1.75 on 1/30/24. On admission, Scr was 1.9 >>> 6  - No improvement with IVF, likely ATN i/s/o contrast/medications  - CRRT stopped 3/3 8pm with initial plan for intermittent HD  - trial bumex 4mg IV x1 (3/4) -> no output, bladder scan 3/5 with 180cc urine  - pt on levo s/p cardiac arrest 3/4, likely unable to tolerate HD/CRRT at this time  - started sodium bicarb 650mg TID (3/5)  - Nephro following    # Hyperkalemia (resolved)   - K 6.2 at ED arrival  - Shifted in the ED, no EKG changes consistent with severe hyper K. Cr not significantly elevated form baseline, c/f RTA from bactrim and Tacro. Stable   - s/p Lokelma 10mg BID for 3 days  - dialysis as above    ==================== GASTROINTESTINAL===================  # Ileus, resolved  #+/- coffee ground emesis, resolved  - 1 episode of coffee ground emesis, unclear if true GIB  - CTAP (2/22) ileus vs partial SBO - cannot r/o GI bleed  - c/w PPI IV 40 BID  - feeds held 3/4 s/p cardiac arrest  - rising AST/ALT, likely shock liver i/s/o cardiac arrest    =======================    ENDOCRINE  =====================  # DMT2  - A1c 5.8  - ISS    ========================INFECTIOUS DISEASE================  # Septic shock   # Hypothermia  # bacteremia  - Presented with T 91.4F, WBC 21 concerning for possible occult infection,   - U/A without LE or Nitrites, CXR without clear consolidation, MRSA negative Lower concern for meningitis at this moment  - 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 No manifest signs of bowel ischemia. Interval worsening of right lower lobe and right middle lobe atelectasis secondary to elevated right hemidiaphragm when compared to prior CT dated 7/11/2023. Superimposed infection in the collapsed lungs is not excluded  - meropenem (2/21-2/24), restarted meropenem for CNS coverage (2/28 - )  - s/p vancomycin for CNS coverage (2/28 - 3/4)  - s/p zosyn (2/24 - 2/28)  - MRSA swab negative  - hold home Valganciclovir for ppx --> switch to ganciclovir ppx  - f/u infectious labs - Toxoplasma, EBV, Fungitell, Galactomannan, CMV, Cryptococcus, MRSA, ASCENCION virus, CMV, Crypto, ASCENCION virus  - f/u GI PCR, consider C. Diff testing   - Transplant ID recommendations

## 2025-03-06 NOTE — PROGRESS NOTE ADULT - ATTENDING COMMENTS
History of heart transplant in 2018  Admitted with encephalopathy of unknown origin  Had a PEA arrest on 3/4  ROSC was achieved multiple times but during attempted airway intubation he was intubated in his esophagus   Reportedly he remained that way for several minutes before being successfully intubated tracheally  No sedation, minimal brainstem reflexes, head CT benign - f/u Neuro recs  Vasodilatory shock requiring Levophed infusion post arrest; lactate elevated but downtrending - wean as able  Continue outpatient Prednisone along with outpatient Tacro  TTE with preserved LVEF  Tolerating tube feeds  Oliguric NORMAN; I feel he is too unstable to tolerate either iHD or CVVH   H/H low but acceptable on Heparin drip for afib   Afebrile, on empiric Meropenem and Vancomycin, cultures negative  Sugars controlled  SAMEERA Pickard 3/3  Prognosis is poor - as has been discussed with the family, I think further aggressive care is unfortunately medically futile and therefore care is capped. History of heart transplant in 2018  Admitted with encephalopathy of unknown origin  Had a PEA arrest on 3/4  ROSC was achieved multiple times but during attempted airway intubation he was intubated in his esophagus   Reportedly he remained that way for several minutes before being successfully intubated tracheally  No sedation, minimal brainstem reflexes, head CT benign - f/u Neuro recs  Vasodilatory shock requiring Levophed infusion post arrest; lactate elevated but downtrending - wean as able  Continue outpatient Prednisone along with outpatient Tacro  TTE with preserved LVEF  Tolerating tube feeds  Oliguric NORMAN; I feel he is too unstable to tolerate either iHD or CVVH   H/H low but acceptable on Heparin drip for afib   Afebrile, on empiric Meropenem and Vancomycin, cultures negative  Sugars controlled  SAMEERA Pickard 3/3  Prognosis is poor - as has been discussed with the family, I think further aggressive care is unfortunately medically futile and therefore care is capped. After further discussions today, patient is now DNR. Further discussions will be ongoing re: comfort measures.

## 2025-03-06 NOTE — PROGRESS NOTE ADULT - ATTENDING COMMENTS
NORMAN-ATN    Was on CRRT and plan was to transition to HD but   Had a PEA arrest on 3/5> now on high dose pressors   no emergent dialysis need right now and holding off on CRRT per discussion with CCU team due to ongoing GOC discussions     jyoti abraham  nephrology attending   please contact me on TEAMS   Office- [phone]

## 2025-03-07 NOTE — PROGRESS NOTE ADULT - SUBJECTIVE AND OBJECTIVE BOX
[facility] Division of Kidney Diseases & Hypertension  FOLLOW UP NOTE  [phone]--------------------------------------------------------------------------------  Chief Complaint: NORMAN on CKD, now on CRRT    24 hour events/subjective: Pt seen and evaluated this morning in the ICU. Pt intubated on high dose IV vasopressor, now capped as per RN. Not on sedation however no mental status. Per RN no notable UOP.     PAST HISTORY  --------------------------------------------------------------------------------  No significant changes to PMH, PSH, FHx, SHx, unless otherwise noted    ALLERGIES & MEDICATIONS  --------------------------------------------------------------------------------  Allergies    No Known Allergies    Intolerances    Standing Inpatient Medications  atovaquone  Suspension 1500 milliGRAM(s) Oral daily  chlorhexidine 0.12% Liquid 15 milliLiter(s) Oral Mucosa every 12 hours  chlorhexidine 2% Cloths 1 Application(s) Topical daily  cholecalciferol 1000 Unit(s) Oral daily  ganciclovir IVPB 115 milliGRAM(s) IV Intermittent <User Schedule>  insulin lispro (ADMELOG) corrective regimen sliding scale   SubCutaneous every 6 hours  meropenem  IVPB 1000 milliGRAM(s) IV Intermittent every 24 hours  norepinephrine Infusion 0.7 MICROgram(s)/kG/Min IV Continuous <Continuous>  nystatin    Suspension 201089 Unit(s) Oral four times a day  pantoprazole  Injectable 40 milliGRAM(s) IV Push two times a day  polyethylene glycol 3350 17 Gram(s) Oral daily  predniSONE   Tablet 15 milliGRAM(s) Oral daily  senna 2 Tablet(s) Oral daily  sodium bicarbonate 650 milliGRAM(s) Oral every 8 hours    PRN Inpatient Medications    REVIEW OF SYSTEMS  --------------------------------------------------------------------------------  see above    VITALS/PHYSICAL EXAM  --------------------------------------------------------------------------------  T(C): 36.8 (03-07-25 @ 03:00), Max: 36.8 (03-06-25 @ 09:00)  HR: 146 (03-07-25 @ 08:00) (120 - 150)  BP: --  RR: 16 (03-07-25 @ 08:00) (16 - 28)  SpO2: 99% (03-07-25 @ 08:00) (94% - 99%)  Wt(kg): --    03-06-25 @ 07:01  -  03-07-25 @ 07:00  --------------------------------------------------------  IN: 1593.1 mL / OUT: 0 mL / NET: 1593.1 mL    03-07-25 @ 07:01  -  03-07-25 @ 08:49  --------------------------------------------------------  IN: 71.4 mL / OUT: 0 mL / NET: 71.4 mL    Physical Exam:  Gen: ill-appearing  HEENT: +ETT  Pulm: CTA B/L  CV: S1S2  Abd: Soft, +BS   Ext: No LE edema B/L  Neuro: Off sedation and w/o mental status, no response to verbal or tactile stimuli   Skin: Warm and dry  Dialysis access: [facility]    LABS/STUDIES  --------------------------------------------------------------------------------              9.0    44.97 >-----------<  328      [03-07-25 @ 00:42]              29.1     139  |  105  |  71  ----------------------------<  169      [03-07-25 @ 00:42]  4.8   |  16  |  6.24        Ca     6.8     [03-07-25 @ 00:42]      Mg     2.4     [03-07-25 @ 00:42]      Phos  7.6     [03-07-25 @ 00:42]    TPro  4.2  /  Alb  1.9  /  TBili  1.1  /  DBili  x   /  AST  2364  /  ALT  569  /  AlkPhos  117  [03-07-25 @ 00:42]    PT/INR: PT 24.5 , INR 2.15       [03-06-25 @ 00:44]  PTT: 31.5       [03-06-25 @ 00:44]    Creatinine Trend:  SCr 6.24 [03-07 @ 00:42]  SCr 5.11 [03-06 @ 00:44]  SCr 3.95 [03-05 @ 11:00]  SCr 3.24 [03-05 @ 00:51]  SCr 1.87 [03-04 @ 01:05]    HBsAb <3.0      [03-05-25 @ 06:51]  HBsAg Nonreact      [03-05-25 @ 06:51]  HBcAb Nonreact      [03-05-25 @ 06:51]

## 2025-03-07 NOTE — EEG REPORT - NS EEG TEXT BOX
BILLY RUSSELL [id number]     Study Date: 03-06-25 08:00 - 03-07-25 08:00  Duration: 24h  --------------------------------------------------------------------------------------------------  History:  CC/ HPI Patient is a 74y old  Male who presents with a chief complaint of Lethargy (06 Mar 2025 09:41)    --------------------------------------------------------------------------------------------------  Study Interpretation:    [[[Abbreviation Key:  PDR=alpha rhythm/posterior dominant rhythm. A-P=anterior posterior.  Amplitude: ‘very low’:<20; ‘low’:20-49; ‘medium’:; ‘high’:>150uV.  Persistence for periodic/rhythmic patterns (% of epoch) ‘rare’:<1%; ‘occasional’:1-10%; ‘frequent’:10-50%; ‘abundant’:50-90%; ‘continuous’:>90%.  Persistence for sporadic discharges: ‘rare’:<1/hr; ‘occasional’:1/min-1/hr; ‘frequent’:>1/min; ‘abundant’:>1/10 sec.  RPP=rhythmic and periodic patterns; GRDA=generalized rhythmic delta activity; FIRDA=frontal intermittent GRDA; LRDA=lateralized rhythmic delta activity; TIRDA=temporal intermittent rhythmic delta activity;  LPD=PLED=lateralized periodic discharges; GPD=generalized periodic discharges; BIPDs =bilateral independent periodic discharges; Mf=multifocal; SIRPDs=stimulus induced rhythmic, periodic, or ictal appearing discharges; BIRDs=brief potentially ictal rhythmic discharges >4 Hz, lasting .5-10s; PFA (paroxysmal bursts >13 Hz or =8 Hz <10s).  Modifiers: +F=with fast component; +S=with spike component; +R=with rhythmic component.  S-B=burst suppression pattern.  Max=maximal. N1-drowsy; N2-stage II sleep; N3-slow wave sleep. SSS/BETS=small sharp spikes/benign epileptiform transients of sleep. HV=hyperventilation; PS=photic stimulation]]]    Daily EEG Visual Analysis    FINDINGS:      Background:  The background is symmetric and diffusely suppressed < 10 uV.    Background Slowing:  Generalized slowing: As above  Focal slowing: None    State Changes:   Absent    Interictal Findings:  None    Electrographic and Electroclinical seizures:  None    Other Clinical Events:  None    Activation Procedures:   Hyperventilation is not performed.    Photic stimulation is performed and does not elicit any abnormalities.      Artifacts:  Intermittent myogenic and movement artifacts are present. Continuous EKG artifact.    Single-lead EKG: Regular rhythm at ~140-150 bpm    EEG Classification / Summary:  Abnormal EEG in a comatose patient.  Diffusely suppressed background.  No focal or epileptiform abnormalities are captured.   No seizures.    Clinical Impression:  Severe diffuse cerebral dysfunction is nonspecific in etiology.   No epileptiform abnormalities or seizures.  Tachycardia on single-lead EKG.    READ IS PRELIMINARY    -------------------------------------------------------------------------------------------------------  Akiko Smallwood MD, PhD  Neurocritical Care    -------------------------------------------------------------------------------------------------------    To reach EEG technologist:  Please use the pager number for the appropriate hospital or contact the .  At [facility] - Pager #: [phone]    To reach EEG-reading physician:  EEG Reading Room Phone #: [phone]  Epilepsy Answering Service after 5PM and before 8:30AM: Phone #: [phone]     BILLY RUSSELL [id number]     Study Date: 03-06-25 08:00 - 03-07-25 08:00  Duration: 24h  --------------------------------------------------------------------------------------------------  History:  CC/ HPI Patient is a 74y old  Male who presents with a chief complaint of Lethargy (06 Mar 2025 09:41)    --------------------------------------------------------------------------------------------------  Study Interpretation:    [[[Abbreviation Key:  PDR=alpha rhythm/posterior dominant rhythm. A-P=anterior posterior.  Amplitude: ‘very low’:<20; ‘low’:20-49; ‘medium’:; ‘high’:>150uV.  Persistence for periodic/rhythmic patterns (% of epoch) ‘rare’:<1%; ‘occasional’:1-10%; ‘frequent’:10-50%; ‘abundant’:50-90%; ‘continuous’:>90%.  Persistence for sporadic discharges: ‘rare’:<1/hr; ‘occasional’:1/min-1/hr; ‘frequent’:>1/min; ‘abundant’:>1/10 sec.  RPP=rhythmic and periodic patterns; GRDA=generalized rhythmic delta activity; FIRDA=frontal intermittent GRDA; LRDA=lateralized rhythmic delta activity; TIRDA=temporal intermittent rhythmic delta activity;  LPD=PLED=lateralized periodic discharges; GPD=generalized periodic discharges; BIPDs =bilateral independent periodic discharges; Mf=multifocal; SIRPDs=stimulus induced rhythmic, periodic, or ictal appearing discharges; BIRDs=brief potentially ictal rhythmic discharges >4 Hz, lasting .5-10s; PFA (paroxysmal bursts >13 Hz or =8 Hz <10s).  Modifiers: +F=with fast component; +S=with spike component; +R=with rhythmic component.  S-B=burst suppression pattern.  Max=maximal. N1-drowsy; N2-stage II sleep; N3-slow wave sleep. SSS/BETS=small sharp spikes/benign epileptiform transients of sleep. HV=hyperventilation; PS=photic stimulation]]]    Daily EEG Visual Analysis    FINDINGS:      Background:  The background is symmetric and diffusely suppressed < 10 uV.    Background Slowing:  Generalized slowing: As above  Focal slowing: None    State Changes:   Absent    Interictal Findings:  None    Electrographic and Electroclinical seizures:  None    Other Clinical Events:  None    Activation Procedures:   Hyperventilation is not performed.    Photic stimulation is performed and does not elicit any abnormalities.      Artifacts:  Intermittent myogenic and movement artifacts are present. Continuous EKG artifact.    Single-lead EKG: Regular rhythm at ~140-150 bpm    EEG Classification / Summary:  Abnormal EEG in a comatose patient.  Diffusely suppressed background.  No focal or epileptiform abnormalities are captured.   No seizures.    Clinical Impression:  Severe diffuse cerebral dysfunction is nonspecific in etiology.   No epileptiform abnormalities or seizures.  Tachycardia on single-lead EKG.    READ IS PRELIMINARY    -------------------------------------------------------------------------------------------------------  Akiko Smallwood MD, PhD  Neurocritical Care    KELLY Brown  Attending Physician, [facility]      -------------------------------------------------------------------------------------------------------    To reach EEG technologist:  Please use the pager number for the appropriate hospital or contact the .  At [facility] - Pager #: [phone]    To reach EEG-reading physician:  EEG Reading Room Phone #: [phone]  Epilepsy Answering Service after 5PM and before 8:30AM: Phone #: [phone]     BILLY RUSSELL [id number]     Study Date: 03-06-25 08:00 - 03-07-25 15:13  Duration: 31h 13 min  --------------------------------------------------------------------------------------------------  History:  CC/ HPI Patient is a 74y old  Male who presents with a chief complaint of Lethargy (06 Mar 2025 09:41)    --------------------------------------------------------------------------------------------------  Study Interpretation:    [[[Abbreviation Key:  PDR=alpha rhythm/posterior dominant rhythm. A-P=anterior posterior.  Amplitude: ‘very low’:<20; ‘low’:20-49; ‘medium’:; ‘high’:>150uV.  Persistence for periodic/rhythmic patterns (% of epoch) ‘rare’:<1%; ‘occasional’:1-10%; ‘frequent’:10-50%; ‘abundant’:50-90%; ‘continuous’:>90%.  Persistence for sporadic discharges: ‘rare’:<1/hr; ‘occasional’:1/min-1/hr; ‘frequent’:>1/min; ‘abundant’:>1/10 sec.  RPP=rhythmic and periodic patterns; GRDA=generalized rhythmic delta activity; FIRDA=frontal intermittent GRDA; LRDA=lateralized rhythmic delta activity; TIRDA=temporal intermittent rhythmic delta activity;  LPD=PLED=lateralized periodic discharges; GPD=generalized periodic discharges; BIPDs =bilateral independent periodic discharges; Mf=multifocal; SIRPDs=stimulus induced rhythmic, periodic, or ictal appearing discharges; BIRDs=brief potentially ictal rhythmic discharges >4 Hz, lasting .5-10s; PFA (paroxysmal bursts >13 Hz or =8 Hz <10s).  Modifiers: +F=with fast component; +S=with spike component; +R=with rhythmic component.  S-B=burst suppression pattern.  Max=maximal. N1-drowsy; N2-stage II sleep; N3-slow wave sleep. SSS/BETS=small sharp spikes/benign epileptiform transients of sleep. HV=hyperventilation; PS=photic stimulation]]]    Daily EEG Visual Analysis    FINDINGS:      Background:  The background is symmetric and diffusely suppressed < 10 uV.    Background Slowing:  Generalized slowing: As above  Focal slowing: None    State Changes:   Absent    Interictal Findings:  None    Electrographic and Electroclinical seizures:  None    Other Clinical Events:  None    Activation Procedures:   Hyperventilation is not performed.    Photic stimulation is performed and does not elicit any abnormalities.      Artifacts:  Intermittent myogenic and movement artifacts are present. Continuous EKG artifact.    Single-lead EKG: Regular rhythm at ~140-150 bpm    EEG Classification / Summary:  Abnormal EEG in a comatose patient.  Diffusely suppressed background.  No focal or epileptiform abnormalities are captured.   No seizures.    Clinical Impression:  Severe diffuse cerebral dysfunction is nonspecific in etiology.   No epileptiform abnormalities or seizures.  Tachycardia on single-lead EKG.    READ IS PRELIMINARY    -------------------------------------------------------------------------------------------------------  Akiko Smallwood MD, PhD  Neurocritical Care    KELLY Brown  Attending Physician, [facility]      -------------------------------------------------------------------------------------------------------    To reach EEG technologist:  Please use the pager number for the appropriate hospital or contact the .  At [facility] - Pager #: [phone]    To reach EEG-reading physician:  EEG Reading Room Phone #: [phone]  Epilepsy Answering Service after 5PM and before 8:30AM: Phone #: [phone]

## 2025-03-07 NOTE — PROGRESS NOTE ADULT - ASSESSMENT
73M w/ HTN, HLD, nonischemic cardiomyopathy, chronic systolic heart failure s/p HM2 LVAD (6/2017), s/p heart transplant from Hep. 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 Admitted with septic shock w/ pseudomonus bacteremia.    NORMAN on CKD3 iso septic shock w/ pseudomonus bacteremia, now requiring dialysis.  [facility] reviewed. Scr ranged from 1.3-2.1 in 2023. Most recent Scr was 1.75 on 1/30/24. On admission Scr 1.92 (2/19), worsened to 6.89 (2/23). Required CRRT 2/23-3/3 iso worsening oliguric renal failure w/ hemodynamic instability. CRRT stopped on 3/3 in preparation to transition to iHD. However pt w/ PEA arrest overnight 3/4, now intubated and w/ high IV vasopressor requirements (capped). Labs from today reviewed. BUN/Scr rising 71/6.24 today. SCO2 low 16. Otherwise electrolytes within acceptable range. Pt remains anuric. Per discussion with primary team, pt off sedation and w/o mental status now undergoing neuro workup. Currently requiring high dose IV vasopressor (which is capped) and unlikely to tolerate CRRT/iHD at this time. No emergent dialysis need right now. When more stable and depending on GOC discussion will need to reconsider dialysis. Increase sodium bicarb to 1300 tid. Monitor labs, daily wts and I/Os. Immunosuppression as per primary team.     If you have any questions, please feel free to contact me  Boston Caballero  Nephrology Fellow  [employer]/Page [phone]  (After 5pm or on weekends please page the on-call fellow)

## 2025-03-07 NOTE — PROGRESS NOTE ADULT - NSPROGADDITIONALINFOA_GEN_ALL_CORE
Fellow attestation: I have discussed the care of this patient with the ACP and agree with the plan as noted above. Appears he has been in some SVT around 150 for the past couple days (possibly Atach as looks to be long RP tachycardia. Considered adenosine however currently DNR/DNI. His pressor requirements have come down slightly since the day shift, now levo at 0.37.
Problem: Need for prophylactic measure.   - Fluids: None  - Electrolytes: Will replete to maintain K>4, Phos>3, and Mag>2  - Nutrition: Low K diet, regular   - Activity: PT   - DVT Prophylaxis: on Eliquis on hold - SCDs  - Stress Ulcer/GI Prophylaxis:   - Disposition: PT pending.

## 2025-03-07 NOTE — PROGRESS NOTE ADULT - ASSESSMENT
====================ASSESSMENT ==============  73yo M w/ pmhx HTN, HLD, nonischemic cardiomyopathy, chronic systolic heart failure s/p HM2 LVAD (6/2017), s/p heart transplant from Hep. 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 Found to have septic shock, (2/19 BCx pseudo, 2/22 BCx NGTD). Still episodes of hypotension. Worsening NORMAN. Accepted to MICU for CRRT.    Plan:  ====================== NEUROLOGY=====================  # Metabolic encephalopathy  - Likely multi-factorial etiologies: septic vs uremic vs hypercapnic, less likely meningitis  - Patient baseline AOx3. AOx1 at ED arrival, iso of infection vs metabolic derangements  - CTH and angio in ED w/o hemorrhage or stenosis   - spot EEG 2/21: negative for seizures  - s/p PEA arrest (3/4) with approx. 30min downtime  - Currently A&Ox0, unresponsive to pain, pupils fixed and dilated  - CT head (3/5): negative for bleed or lesions  - 24hr EEG (3/6): negative for seizures  - febrile Tmax 104F overnight (3/5) -> tylenol PRN q6hr, cooling blanket  - Neuro following  - Monitor mental status per protocol  - s/p precedex gtt 1.5 for agitation (off 3/4)  - s/p seroquel 25mg PO q6hr per psych recs, (off 3/5)    ==================== RESPIRATORY======================  - intubated (3/4) s/p cardiac arrest  - 500/6/22/40%  - CXR with R-sided moderate effusion (3/5)    ====================CARDIOVASCULAR==================  # Septic shock from pseudomonas bacteremia  - POCUS reveals no evidence of  fluid overload  - s/p fluid resuscitation    #PEA arrest (3/4)  - approx. 30min downtime  - Pressors: levo 0.7 (3/4- )  - lactate 7.4, now downtrending    # h/o heart transplant  - Nonischemic cardiomyopathy, chronic systolic heart failure s/p HM2 LVAD (6/2017), s/p heart transplant from Hep. C donor (treated) 2/23/18   - c/w tacro w/ daily levels  - Intolerant of Cellcept due to leukopenia  - c/w home prednisone 15mg daily  - c/w Atovaquone for ppx  - c/w nystatin for ppx  - hold home ASA    # Paroxysmal atrial fibrillation.   - Home regimen : Eliquis 5 mg PO BID at home for hx of of afib and IJ thrombi  - EKG on admission - NSR  - hold lopressor 25 BID (3/4)  - s/p heparin gtt  - hold home ASA    # Hypertension (chronic)  - Holding home Losartan 75 mg PO QD iso shock    # Vascular disorder of lower extremity   - Extremities appear cool and dry   - c/w local wound care by podiatry     ===================HEMATOLOGIC/ONC ===================  # Hx of hemolytic anemia, Sanchez Syndrome  - Follows with Dr. Rosario as outpatient  - Prednisone 15 mg PO QD --> switched to Solu-cortef 75mg IV q8hr (2/26-3/3) > back to prednisone 15mg (3/3)  - f/u hematology/oncology recs  - Monitor H&H daily  - f/u hemolysis labs and peripheral smear    # DVT ppx: hold home ASA 81mg    ===================== RENAL =========================  # NORMAN on CKD  # ATN 2/2 septic shock  - Scr ranged from 1.3-2.1 in 2023. Most recent Scr was 1.75 on 1/30/24. On admission, Scr was 1.9 >>> 6  - No improvement with IVF, likely ATN i/s/o contrast/medications  - CRRT stopped 3/3 8pm with initial plan for intermittent HD  - trial bumex 4mg IV x1 (3/4) -> no output, bladder scan 3/5 with 180cc urine  - pt on levo s/p cardiac arrest 3/4, likely unable to tolerate HD/CRRT at this time  - started sodium bicarb 650mg TID (3/5)  - Nephro following    # Hyperkalemia (resolved)   - K 6.2 at ED arrival  - Shifted in the ED, no EKG changes consistent with severe hyper K. Cr not significantly elevated form baseline, c/f RTA from bactrim and Tacro. Stable   - s/p Lokelma 10mg BID for 3 days  - dialysis as above    ==================== GASTROINTESTINAL===================  # Ileus, resolved  #+/- coffee ground emesis, resolved  - 1 episode of coffee ground emesis, unclear if true GIB  - CTAP (2/22) ileus vs partial SBO - cannot r/o GI bleed  - c/w PPI IV 40 BID  - feeds held 3/4 s/p cardiac arrest  - rising AST/ALT, likely shock liver i/s/o cardiac arrest    =======================    ENDOCRINE  =====================  # DMT2  - A1c 5.8  - ISS    ========================INFECTIOUS DISEASE================  # Septic shock   # Hypothermia  # bacteremia  - Presented with T 91.4F, WBC 21 concerning for possible occult infection,   - U/A without LE or Nitrites, CXR without clear consolidation, MRSA negative Lower concern for meningitis at this moment  - 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 No manifest signs of bowel ischemia. Interval worsening of right lower lobe and right middle lobe atelectasis secondary to elevated right hemidiaphragm when compared to prior CT dated 7/11/2023. Superimposed infection in the collapsed lungs is not excluded  - meropenem (2/21-2/24), restarted meropenem for CNS coverage (2/28 - )  - s/p vancomycin for CNS coverage (2/28 - 3/4)  - s/p zosyn (2/24 - 2/28)  - MRSA swab negative  - hold home Valganciclovir for ppx --> switch to ganciclovir ppx  - f/u infectious labs - Toxoplasma, EBV, Fungitell, Galactomannan, CMV, Cryptococcus, MRSA, ASCENCION virus, CMV, Crypto, ASCENCION virus  - f/u GI PCR, consider C. Diff testing   - Transplant ID recommendations      ====================ASSESSMENT ==============  75yo M w/ pmhx HTN, HLD, nonischemic cardiomyopathy, chronic systolic heart failure s/p HM2 LVAD (6/2017), s/p heart transplant from Hep. 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 Found to have septic shock, (2/19 BCx pseudo, 2/22 BCx NGTD). Still episodes of hypotension. Worsening NORMAN. Accepted to MICU for CRRT.    Plan:  ====================== NEUROLOGY=====================  # Metabolic encephalopathy  - Likely multi-factorial etiologies: septic vs uremic vs hypercapnic, less likely meningitis  - Patient baseline AOx3. AOx1 at ED arrival, iso of infection vs metabolic derangements  - CTH and angio in ED w/o hemorrhage or stenosis   - spot EEG 2/21: negative for seizures  - s/p PEA arrest (3/4) with approx. 30min downtime  - Currently A&Ox0, unresponsive to pain, pupils fixed and dilated  - CT head (3/5): negative for bleed or lesions  - 24hr EEG (3/6): negative for seizures  - febrile Tmax 104F overnight (3/5) -> tylenol PRN q6hr, cooling blanket  - f/u neuron specific enolase  - plan for MRI brain w/o con 3/9, per neuro recs  - Neuro following  - Monitor mental status per protocol  - s/p precedex gtt 1.5 for agitation (off 3/4)  - s/p seroquel 25mg PO q6hr per psych recs, (off 3/5)    ==================== RESPIRATORY======================  - intubated (3/4) s/p cardiac arrest  - 500/6/22/40%  - CXR with R-sided moderate effusion (3/5)    ====================CARDIOVASCULAR==================  # Septic shock from pseudomonas bacteremia  - POCUS reveals no evidence of  fluid overload  - s/p fluid resuscitation    #PEA arrest (3/4)  - approx. 30min downtime  - Pressors: levo 0.4 (3/4- )  - lactate 7.4, now downtrending    # h/o heart transplant  - Nonischemic cardiomyopathy, chronic systolic heart failure s/p HM2 LVAD (6/2017), s/p heart transplant from Hep. C donor (treated) 2/23/18  - Intolerant of Cellcept due to leukopenia  - hold home tacro, per heart failure recs  - c/w home prednisone 15mg daily  - c/w Atovaquone for ppx  - c/w nystatin for ppx  - hold home ASA    # Paroxysmal atrial fibrillation.   - Home regimen : Eliquis 5 mg PO BID at home for hx of of afib and IJ thrombi  - EKG on admission - NSR  - hold lopressor 25 BID (3/4)  - s/p heparin gtt  - hold home ASA    # Hypertension (chronic)  - Holding home Losartan 75 mg PO QD iso shock    # Vascular disorder of lower extremity   - Extremities appear cool and dry   - c/w local wound care by podiatry     ===================HEMATOLOGIC/ONC ===================  # Hx of hemolytic anemia, Sanchez Syndrome  - Follows with Dr. Rosario as outpatient  - Prednisone 15 mg PO QD --> switched to Solu-cortef 75mg IV q8hr (2/26-3/3) > back to prednisone 15mg (3/3)  - f/u hematology/oncology recs  - Monitor H&H daily  - f/u hemolysis labs and peripheral smear    # DVT ppx: hold home ASA 81mg    ===================== RENAL =========================  # NORMAN on CKD  # ATN 2/2 septic shock  - Scr ranged from 1.3-2.1 in 2023. Most recent Scr was 1.75 on 1/30/24. On admission, Scr was 1.9 >>> 6  - No improvement with IVF, likely ATN i/s/o contrast/medications  - CRRT stopped 3/3 8pm with initial plan for intermittent HD  - trial bumex 4mg IV x1 (3/4) -> no output, bladder scan 3/5 with 180cc urine  - pt on levo s/p cardiac arrest 3/4, likely unable to tolerate HD/CRRT at this time  - sodium bicarb 1300mg TID  - Nephro following    # Hyperkalemia (resolved)   - K 6.2 at ED arrival  - Shifted in the ED, no EKG changes consistent with severe hyper K. Cr not significantly elevated form baseline, c/f RTA from bactrim and Tacro. Stable   - s/p Lokelma 10mg BID for 3 days  - dialysis as above    ==================== GASTROINTESTINAL===================  # Ileus, resolved  #+/- coffee ground emesis, resolved  - 1 episode of coffee ground emesis, unclear if true GIB  - CTAP (2/22) ileus vs partial SBO - cannot r/o GI bleed  - c/w PPI IV 40 BID  - feeds held 3/4 s/p cardiac arrest  - rising AST/ALT, likely shock liver i/s/o cardiac arrest    =======================    ENDOCRINE  =====================  # DMT2  - A1c 5.8  - ISS    ========================INFECTIOUS DISEASE================  # Septic shock   # Hypothermia  # bacteremia  - Presented with T 91.4F, WBC 21 concerning for possible occult infection,   - U/A without LE or Nitrites, CXR without clear consolidation, MRSA negative Lower concern for meningitis at this moment  - 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 No manifest signs of bowel ischemia. Interval worsening of right lower lobe and right middle lobe atelectasis secondary to elevated right hemidiaphragm when compared to prior CT dated 7/11/2023. Superimposed infection in the collapsed lungs is not excluded  - meropenem (2/21-2/24), restarted meropenem for CNS coverage (2/28 - )  - s/p vancomycin for CNS coverage (2/28 - 3/4)  - s/p zosyn (2/24 - 2/28)  - MRSA swab negative  - hold home Valganciclovir for ppx --> switch to ganciclovir ppx  - f/u infectious labs - Toxoplasma, EBV, Fungitell, Galactomannan, CMV, Cryptococcus, MRSA, ASCENCION virus, CMV, Crypto, ASCENCION virus  - f/u GI PCR, consider C. Diff testing   - Transplant ID recommendations

## 2025-03-07 NOTE — PROGRESS NOTE ADULT - ASSESSMENT
====================ASSESSMENT ==============  75yo M w/ pmhx HTN, HLD, nonischemic cardiomyopathy, chronic systolic heart failure s/p HM2 LVAD (6/2017), s/p heart transplant from Hep. 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 Found to have septic shock, (2/19 BCx pseudo, 2/22 BCx NGTD). Still episodes of hypotension. Worsening NORMAN. Accepted to MICU for CRRT.    Plan:  ====================== NEUROLOGY=====================  # Metabolic encephalopathy  - Likely multi-factorial etiologies: septic vs uremic vs hypercapnic, less likely meningitis  - Patient baseline AOx3. AOx1 at ED arrival, iso of infection vs metabolic derangements  - CTH and angio in ED w/o hemorrhage or stenosis   - spot EEG 2/21: negative for seizures  - s/p PEA arrest (3/4) with approx. 30min downtime  - Currently A&Ox0, unresponsive to pain, pupils fixed and dilated  - CT head (3/5): negative for bleed or lesions  - 24hr EEG (3/6): negative for seizures  - febrile Tmax 104F overnight (3/5) -> tylenol PRN q6hr, cooling blanket  - f/u neuron specific enolase  - plan for MRI brain w/o con 3/9, per neuro recs  - Neuro following  - Monitor mental status per protocol  - s/p precedex gtt 1.5 for agitation (off 3/4)  - s/p seroquel 25mg PO q6hr per psych recs, (off 3/5)    ==================== RESPIRATORY======================  - intubated (3/4) s/p cardiac arrest  - 500/6/22/40%  - CXR with R-sided moderate effusion (3/5)    ====================CARDIOVASCULAR==================  # Septic shock from pseudomonas bacteremia  - POCUS reveals no evidence of  fluid overload  - s/p fluid resuscitation    #PEA arrest (3/4)  - approx. 30min downtime  - Pressors: levo 0.4 (3/4- )  - lactate 7.4, now downtrending    # h/o heart transplant  - Nonischemic cardiomyopathy, chronic systolic heart failure s/p HM2 LVAD (6/2017), s/p heart transplant from Hep. C donor (treated) 2/23/18  - Intolerant of Cellcept due to leukopenia  - hold home tacro, per heart failure recs  - c/w home prednisone 15mg daily  - c/w Atovaquone for ppx  - c/w nystatin for ppx  - hold home ASA    # Paroxysmal atrial fibrillation.   - Home regimen : Eliquis 5 mg PO BID at home for hx of of afib and IJ thrombi  - EKG on admission - NSR  - hold lopressor 25 BID (3/4)  - s/p heparin gtt  - hold home ASA    # Hypertension (chronic)  - Holding home Losartan 75 mg PO QD iso shock    # Vascular disorder of lower extremity   - Extremities appear cool and dry   - c/w local wound care by podiatry     ===================HEMATOLOGIC/ONC ===================  # Hx of hemolytic anemia, Nicole Syndrome  - Follows with Dr. Rosario as outpatient  - Prednisone 15 mg PO QD --> switched to Solu-cortef 75mg IV q8hr (2/26-3/3) > back to prednisone 15mg (3/3)  - f/u hematology/oncology recs  - Monitor H&H daily  - f/u hemolysis labs and peripheral smear    # DVT ppx: hold home ASA 81mg    ===================== RENAL =========================  # NORMAN on CKD  # ATN 2/2 septic shock  - Scr ranged from 1.3-2.1 in 2023. Most recent Scr was 1.75 on 1/30/24. On admission, Scr was 1.9 >>> 6  - No improvement with IVF, likely ATN i/s/o contrast/medications  - CRRT stopped 3/3 8pm with initial plan for intermittent HD  - trial bumex 4mg IV x1 (3/4) -> no output, bladder scan 3/5 with 180cc urine  - pt on levo s/p cardiac arrest 3/4, likely unable to tolerate HD/CRRT at this time  - sodium bicarb 1300mg TID  - Nephro following    # Hyperkalemia (resolved)   - K 6.2 at ED arrival  - Shifted in the ED, no EKG changes consistent with severe hyper K. Cr not significantly elevated form baseline, c/f RTA from bactrim and Tacro. Stable   - s/p Lokelma 10mg BID for 3 days  - dialysis as above    ==================== GASTROINTESTINAL===================  # Ileus, resolved  #+/- coffee ground emesis, resolved  - 1 episode of coffee ground emesis, unclear if true GIB  - CTAP (2/22) ileus vs partial SBO - cannot r/o GI bleed  - c/w PPI IV 40 BID  - feeds held 3/4 s/p cardiac arrest  - rising AST/ALT, likely shock liver i/s/o cardiac arrest    =======================    ENDOCRINE  =====================  # DMT2  - A1c 5.8  - ISS    ========================INFECTIOUS DISEASE================  # Septic shock   # Hypothermia  # bacteremia  - Presented with T 91.4F, WBC 21 concerning for possible occult infection,   - U/A without LE or Nitrites, CXR without clear consolidation, MRSA negative Lower concern for meningitis at this moment  - 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 No manifest signs of bowel ischemia. Interval worsening of right lower lobe and right middle lobe atelectasis secondary to elevated right hemidiaphragm when compared to prior CT dated 7/11/2023. Superimposed infection in the collapsed lungs is not excluded  - meropenem (2/21-2/24), restarted meropenem for CNS coverage (2/28 - )  - s/p vancomycin for CNS coverage (2/28 - 3/4)  - s/p zosyn (2/24 - 2/28)  - MRSA swab negative  - hold home Valganciclovir for ppx --> switch to ganciclovir ppx  - f/u infectious labs - Toxoplasma, EBV, Fungitell, Galactomannan, CMV, Cryptococcus, MRSA, ASCENCION virus, CMV, Crypto, ASCENCION virus  - f/u GI PCR, consider C. Diff testing   - Transplant ID recommendations         Patient requires continuous monitoring with bedside rhythm monitoring, pulse ox monitoring, and intermittent blood gas analysis. Care plan discussed with ICU care team. Patient remained critical and at risk for life threatening decompensation.  Patient seen, examined and plan discussed with CCU team during rounds.     I have personally provided 35 minutes of critical care time excluding time spent on separate procedures, in addition to initial critical care time provided by the CICU Attending, Dr. Santiago.     By signing my name below, I, Kaya Villegas, attest that this documentation has been prepared under the direction and in the presence of SHELLY Petty.   Electronically signed: Katty Buckner, 03-07-25 @ 20:49    I, Angie Son , personally performed the services described in this documentation. all medical record entries made by the ramuibkurt were at my direction and in my presence. I have reviewed the chart and agree that the record reflects my personal performance and is accurate and complete  Electronically signed: SHELLY Petty.

## 2025-03-07 NOTE — PROGRESS NOTE ADULT - SUBJECTIVE AND OBJECTIVE BOX
PATIENT: BILLY RUSSELL, MRN: [id number]    CHIEF COMPLAINT: Patient is a 74y old  Male who presents with a chief complaint of Lethargy (06 Mar 2025 21:23)      INTERVAL HISTORY/OVERNIGHT EVENTS: No overnight events. Pt remains intubated, nonsedated, unresponsive to painful stimulus.    REVIEW OF SYSTEMS:    Constitutional:     [ ] negative [ ] fevers [ ] chills [ ] weight loss [ ] weight gain  HEENT:                  [ ] negative [ ] dry eyes [ ] eye irritation [ ] postnasal drip [ ] nasal congestion  CV:                         [ ] negative  [ ] chest pain [ ] orthopnea [ ] palpitations [ ] murmur  Resp:                     [ ] negative [ ] cough [ ] shortness of breath [ ] dyspnea [ ] wheezing [ ] sputum [ ] hemoptysis  GI:                          [ ] negative [ ] nausea [ ] vomiting [ ] diarrhea [ ] constipation [ ] abd pain [ ] dysphagia   :                        [ ] negative [ ] dysuria [ ] nocturia [ ] hematuria [ ] increased urinary frequency  Musculoskeletal: [ ] negative [ ] back pain [ ] myalgias [ ] arthralgias [ ] fracture  Skin:                       [ ] negative [ ] rash [ ] itch  Neurological:        [ ] negative [ ] headache [ ] dizziness [ ] syncope [ ] weakness [ ] numbness  Psychiatric:           [ ] negative [ ] anxiety [ ] depression  Endocrine:            [ ] negative [ ] diabetes [ ] thyroid problem  Heme/Lymph:      [ ] negative [ ] anemia [ ] bleeding problem  Allergic/Immune: [ ] negative [ ] itchy eyes [ ] nasal discharge [ ] hives [ ] angioedema    [ ] All other systems negative  [x] Unable to assess ROS because __AMS/intubated_____.    MEDICATIONS:  MEDICATIONS  (STANDING):  atovaquone  Suspension 1500 milliGRAM(s) Oral daily  chlorhexidine 0.12% Liquid 15 milliLiter(s) Oral Mucosa every 12 hours  chlorhexidine 2% Cloths 1 Application(s) Topical daily  cholecalciferol 1000 Unit(s) Oral daily  ganciclovir IVPB 115 milliGRAM(s) IV Intermittent <User Schedule>  insulin lispro (ADMELOG) corrective regimen sliding scale   SubCutaneous every 6 hours  meropenem  IVPB 1000 milliGRAM(s) IV Intermittent every 24 hours  norepinephrine Infusion 0.7 MICROgram(s)/kG/Min (119 mL/Hr) IV Continuous <Continuous>  nystatin    Suspension 659230 Unit(s) Oral four times a day  pantoprazole  Injectable 40 milliGRAM(s) IV Push two times a day  polyethylene glycol 3350 17 Gram(s) Oral daily  predniSONE   Tablet 15 milliGRAM(s) Oral daily  senna 2 Tablet(s) Oral daily  sodium bicarbonate 650 milliGRAM(s) Oral every 8 hours    MEDICATIONS  (PRN):      ALLERGIES: Allergies    No Known Allergies    Intolerances        OBJECTIVE:  ICU Vital Signs Last 24 Hrs  T(C): 36.8 (07 Mar 2025 03:00), Max: 36.8 (06 Mar 2025 09:00)  T(F): 98.2 (07 Mar 2025 03:00), Max: 98.3 (06 Mar 2025 09:00)  HR: 146 (07 Mar 2025 08:00) (120 - 150)  BP: --  BP(mean): --  ABP: 108/38 (07 Mar 2025 08:00) (81/63 - 142/50)  ABP(mean): 53 (07 Mar 2025 08:00) (45 - 101)  RR: 16 (07 Mar 2025 08:00) (16 - 28)  SpO2: 99% (07 Mar 2025 08:00) (94% - 99%)    O2 Parameters below as of 07 Mar 2025 08:00  Patient On (Oxygen Delivery Method): ventilator    O2 Concentration (%): 40      Mode: AC/ CMV (Assist Control/ Continuous Mandatory Ventilation)  RR (machine): 16  TV (machine): 500  FiO2: 40  PEEP: 5  ITime: 1  MAP: 10  PIP: 18    Adult Advanced Hemodynamics Last 24 Hrs  CVP(mm Hg): --  CVP(cm H2O): --  CO: --  CI: --  PA: --  PA(mean): --  PCWP: --  SVR: --  SVRI: --  PVR: --  PVRI: --  CAPILLARY BLOOD GLUCOSE      POCT Blood Glucose.: 138 mg/dL (07 Mar 2025 06:12)  POCT Blood Glucose.: 157 mg/dL (07 Mar 2025 00:34)  POCT Blood Glucose.: 235 mg/dL (06 Mar 2025 17:13)  POCT Blood Glucose.: 227 mg/dL (06 Mar 2025 11:45)    CAPILLARY BLOOD GLUCOSE      POCT Blood Glucose.: 138 mg/dL (07 Mar 2025 06:12)    I&O's Summary    06 Mar 2025 07:01  -  07 Mar 2025 07:00  --------------------------------------------------------  IN: 1593.1 mL / OUT: 0 mL / NET: 1593.1 mL    07 Mar 2025 07:01  -  07 Mar 2025 08:40  --------------------------------------------------------  IN: 71.4 mL / OUT: 0 mL / NET: 71.4 mL      Daily     Daily     PHYSICAL EXAMINATION:  General: Intubated, unresponsive to painful stimulus  HEENT: Moist mucous membranes.  Respiratory: Intubated, reduced breath sounds R-sided lung fields.  CV: tachycardic rate 130s, S1S2, no murmurs, rubs or gallops. No JVD. Distal pulses intact.  Abdominal: Soft, nontender, nondistended, no rebound or guarding, normal bowel sounds.  Neurology: unresponsive, no response to painful stimulus, R pupil 5mm nonreactive, L pupil 7mm nonreactive.  Extremities: (+) 2+ pitting edema b/l UE and LE, + Peripheral pulses.    LABS:  ABG - ( 07 Mar 2025 00:31 )  pH, Arterial: 7.24  pH, Blood: x     /  pCO2: 46    /  pO2: 134   / HCO3: 20    / Base Excess: -7.4  /  SaO2: 99.0                                    9.0    44.97 )-----------( 328      ( 07 Mar 2025 00:42 )             29.1     03-07    139  |  105  |  71[H]  ----------------------------<  169[H]  4.8   |  16[L]  |  6.24[H]    Ca    6.8[L]      07 Mar 2025 00:42  Phos  7.6     03-07  Mg     2.4     03-07    TPro  4.2[L]  /  Alb  1.9[L]  /  TBili  1.1  /  DBili  x   /  AST  2364[H]  /  ALT  569[H]  /  AlkPhos  117  03-07    LIVER FUNCTIONS - ( 07 Mar 2025 00:42 )  Alb: 1.9 g/dL / Pro: 4.2 g/dL / ALK PHOS: 117 U/L / ALT: 569 U/L / AST: 2364 U/L / GGT: x           PT/INR - ( 06 Mar 2025 00:44 )   PT: 24.5 sec;   INR: 2.15 ratio         PTT - ( 06 Mar 2025 00:44 )  PTT:31.5 sec        Urinalysis Basic - ( 07 Mar 2025 00:42 )    Color: x / Appearance: x / SG: x / pH: x  Gluc: 169 mg/dL / Ketone: x  / Bili: x / Urobili: x   Blood: x / Protein: x / Nitrite: x   Leuk Esterase: x / RBC: x / WBC x   Sq Epi: x / Non Sq Epi: x / Bacteria: x        TELEMETRY:     EKG:     IMAGING:

## 2025-03-07 NOTE — PROGRESS NOTE ADULT - SUBJECTIVE AND OBJECTIVE BOX
INFECTIOUS DISEASES FOLLOW UP-- Sujey Lujan  [phone]    This is a follow up note for this  74yMale with  Hyperkalemia        ROS:  CONSTITUTIONAL:  No fever, good appetite  CARDIOVASCULAR:  No chest pain or palpitations  RESPIRATORY:  No dyspnea  GASTROINTESTINAL:  No nausea, vomiting, diarrhea, or abdominal pain  GENITOURINARY:  No dysuria  NEUROLOGIC:  No headache,     Allergies    No Known Allergies    Intolerances        ANTIBIOTICS/RELEVANT:  antimicrobials  atovaquone  Suspension 1500 milliGRAM(s) Oral daily  ganciclovir IVPB 115 milliGRAM(s) IV Intermittent <User Schedule>  meropenem  IVPB 1000 milliGRAM(s) IV Intermittent every 24 hours  nystatin    Suspension 830842 Unit(s) Oral four times a day    immunologic:    OTHER:  chlorhexidine 0.12% Liquid 15 milliLiter(s) Oral Mucosa every 12 hours  chlorhexidine 2% Cloths 1 Application(s) Topical daily  cholecalciferol 1000 Unit(s) Oral daily  insulin lispro (ADMELOG) corrective regimen sliding scale   SubCutaneous every 6 hours  norepinephrine Infusion 0.7 MICROgram(s)/kG/Min IV Continuous <Continuous>  pantoprazole  Injectable 40 milliGRAM(s) IV Push two times a day  predniSONE   Tablet 15 milliGRAM(s) Oral daily  senna 2 Tablet(s) Oral daily  sodium bicarbonate 650 milliGRAM(s) Oral every 8 hours      Objective:  Vital Signs Last 24 Hrs  T(C): 38.7 (07 Mar 2025 16:30), Max: 38.7 (07 Mar 2025 16:30)  T(F): 101.7 (07 Mar 2025 16:30), Max: 101.7 (07 Mar 2025 16:30)  HR: 156 (07 Mar 2025 17:30) (127 - 156)  BP: --  BP(mean): --  RR: 22 (07 Mar 2025 17:30) (12 - 22)  SpO2: 99% (07 Mar 2025 17:30) (94% - 100%)    Parameters below as of 07 Mar 2025 17:00  Patient On (Oxygen Delivery Method): ventilator    O2 Concentration (%): 40    PHYSICAL EXAM:  Constitutional:no acute distress  Eyes:WALT, EOMI  Ear/Nose/Throat: no oral lesions, 	  Respiratory: clear BL  Cardiovascular: S1S2  Gastrointestinal:soft, (+) BS, no tenderness  Extremities:no e/e/c  No Lymphadenopathy  IV sites not inflammed.    LABS:                        9.0    44.97 )-----------( 328      ( 07 Mar 2025 00:42 )             29.1     03-07    139  |  105  |  71[H]  ----------------------------<  169[H]  4.8   |  16[L]  |  6.24[H]    Ca    6.8[L]      07 Mar 2025 00:42  Phos  7.6     03-07  Mg     2.4     03-07    TPro  4.2[L]  /  Alb  1.9[L]  /  TBili  1.1  /  DBili  x   /  AST  2364[H]  /  ALT  569[H]  /  AlkPhos  117  03-07    PT/INR - ( 06 Mar 2025 00:44 )   PT: 24.5 sec;   INR: 2.15 ratio         PTT - ( 06 Mar 2025 00:44 )  PTT:31.5 sec  Urinalysis Basic - ( 07 Mar 2025 00:42 )    Color: x / Appearance: x / SG: x / pH: x  Gluc: 169 mg/dL / Ketone: x  / Bili: x / Urobili: x   Blood: x / Protein: x / Nitrite: x   Leuk Esterase: x / RBC: x / WBC x   Sq Epi: x / Non Sq Epi: x / Bacteria: x        MICROBIOLOGY:    Cytomegalovirus By PCR: NotDetec IU/mL (03.01.25 @ 02:16)    Lyme PCR, Result: Negative: No B. burgdorferi DNA Detected.  A negative PCR result for Borrelia burgdorferi on a blood  sample does not eliminate the possibility of Lyme disease.  CDC recommends that two-tiered serological testing in  conjunction with clinical evaluation be used as the primary  method of diagnosis.  This test was developed and its performance characteristics  determined by [facility]. It has not been cleared or  approved by the Food and Drug Administration.  Performed At: [address] [id number]  Samuel Leonard MD Ph:[phone] (03.01.25 @ 02:20)    Herpes Simplex Virus 1/2 by PCR, Blood (03.01.25 @ 02:15)    HSV DNA1: NotDetec   HSV DNA2: NotDetec: DiaSoarin Simplexa HSV-1 and 2 Direct is a real-time PCR test for the  qualitative detection and differentiation of HSV-1 and/or HSV-2 viral  DNA. Testing on whole blood has not been approved or cleared by the FDA.  The performance characteristics ofthe assay on whole blood have been  determined by [facility].  The results should be interpreted in the context of all clinical and  laboratory findings.   HSV 1/2 Source: Blood            RECENT CULTURES:      RADIOLOGY & ADDITIONAL STUDIES: INFECTIOUS DISEASES FOLLOW UP-- Sujey Lujan  [phone]    This is a follow up note for this  74yMale with  Hyperkalemia  no clinical improvement  remains intubated, completing EEG      ROS:  CONSTITUTIONAL:  No fever, good appetite  CARDIOVASCULAR:  No chest pain or palpitations  RESPIRATORY:  No dyspnea  GASTROINTESTINAL:  No nausea, vomiting, diarrhea, or abdominal pain  GENITOURINARY:  No dysuria  NEUROLOGIC:  No headache,     Allergies    No Known Allergies    Intolerances        ANTIBIOTICS/RELEVANT:  antimicrobials  atovaquone  Suspension 1500 milliGRAM(s) Oral daily  ganciclovir IVPB 115 milliGRAM(s) IV Intermittent <User Schedule>  meropenem  IVPB 1000 milliGRAM(s) IV Intermittent every 24 hours  nystatin    Suspension 535714 Unit(s) Oral four times a day    immunologic:    OTHER:  chlorhexidine 0.12% Liquid 15 milliLiter(s) Oral Mucosa every 12 hours  chlorhexidine 2% Cloths 1 Application(s) Topical daily  cholecalciferol 1000 Unit(s) Oral daily  insulin lispro (ADMELOG) corrective regimen sliding scale   SubCutaneous every 6 hours  norepinephrine Infusion 0.7 MICROgram(s)/kG/Min IV Continuous <Continuous>  pantoprazole  Injectable 40 milliGRAM(s) IV Push two times a day  predniSONE   Tablet 15 milliGRAM(s) Oral daily  senna 2 Tablet(s) Oral daily  sodium bicarbonate 650 milliGRAM(s) Oral every 8 hours      Objective:  Vital Signs Last 24 Hrs  T(C): 38.7 (07 Mar 2025 16:30), Max: 38.7 (07 Mar 2025 16:30)  T(F): 101.7 (07 Mar 2025 16:30), Max: 101.7 (07 Mar 2025 16:30)  HR: 156 (07 Mar 2025 17:30) (127 - 156)  BP: --  BP(mean): --  RR: 22 (07 Mar 2025 17:30) (12 - 22)  SpO2: 99% (07 Mar 2025 17:30) (94% - 100%)    Parameters below as of 07 Mar 2025 17:00  Patient On (Oxygen Delivery Method): ventilator    O2 Concentration (%): 40    PHYSICAL EXAM:  Constitutional:no acute distress  EEG completed  Eyes:WALT,   Ear/Nose/Throat: no oral lesions, 	  Respiratory: clear BL  Cardiovascular: S1S2  Gastrointestinal:soft, (+) BS, no tenderness  Extremities:no e/e/c  No Lymphadenopathy  IV sites not inflammed.    LABS:                        9.0    44.97 )-----------( 328      ( 07 Mar 2025 00:42 )             29.1     03-07    139  |  105  |  71[H]  ----------------------------<  169[H]  4.8   |  16[L]  |  6.24[H]    Ca    6.8[L]      07 Mar 2025 00:42  Phos  7.6     03-07  Mg     2.4     03-07    TPro  4.2[L]  /  Alb  1.9[L]  /  TBili  1.1  /  DBili  x   /  AST  2364[H]  /  ALT  569[H]  /  AlkPhos  117  03-07    PT/INR - ( 06 Mar 2025 00:44 )   PT: 24.5 sec;   INR: 2.15 ratio         PTT - ( 06 Mar 2025 00:44 )  PTT:31.5 sec  Urinalysis Basic - ( 07 Mar 2025 00:42 )    Color: x / Appearance: x / SG: x / pH: x  Gluc: 169 mg/dL / Ketone: x  / Bili: x / Urobili: x   Blood: x / Protein: x / Nitrite: x   Leuk Esterase: x / RBC: x / WBC x   Sq Epi: x / Non Sq Epi: x / Bacteria: x        MICROBIOLOGY:    Cytomegalovirus By PCR: NotDetec IU/mL (03.01.25 @ 02:16)    Lyme PCR, Result: Negative: No B. burgdorferi DNA Detected.  A negative PCR result for Borrelia burgdorferi on a blood  sample does not eliminate the possibility of Lyme disease.  CDC recommends that two-tiered serological testing in  conjunction with clinical evaluation be used as the primary  method of diagnosis.  This test was developed and its performance characteristics  determined by [facility]. It has not been cleared or  approved by the Food and Drug Administration.  Performed At: [address] [id number]  Samuel Leonard MD Ph:[phone] (03.01.25 @ 02:20)    Herpes Simplex Virus 1/2 by PCR, Blood (03.01.25 @ 02:15)    HSV DNA1: NotDetec   HSV DNA2: NotDetec: DiaSoarin Simplexa HSV-1 and 2 Direct is a real-time PCR test for the  qualitative detection and differentiation of HSV-1 and/or HSV-2 viral  DNA. Testing on whole blood has not been approved or cleared by the FDA.  The performance characteristics ofthe assay on whole blood have been  determined by [facility].  The results should be interpreted in the context of all clinical and  laboratory findings.   HSV 1/2 Source: Blood            RECENT CULTURES:          RADIOLOGY & ADDITIONAL STUDIES:    < from: CT Head No Cont (03.05.25 @ 09:33) >    IMPRESSION: No evidence of acute hemorrhage mass or mass effect.    --- End of Report ---    < end of copied text >  < from: Xray Chest 1 View- PORTABLE-Routine (Xray Chest 1 View- PORTABLE-Routine in AM.) (03.05.25 @ 03:29) >  Frontal expiratory view of the chest shows the heart to be similar in   size. Endotracheal tube reaches the lower trachea. Left jugular dialysis   catheter reaches the SVC/right atrial junction. Nasogastric tube reaches   either the distal stomach or proximal duodenum.    The lungs show less pulmonary congestion with smaller right effusion and   there is no evidence of pneumothorax nor definite left pleural effusion.    IMPRESSION:  Decreased congestion. NG tube as noted    < end of copied text >

## 2025-03-07 NOTE — PROGRESS NOTE ADULT - SUBJECTIVE AND OBJECTIVE BOX
----------------------------------  IN: 1593.1 mL / OUT: 0 mL / NET: 1593.1 mL    07 Mar 2025 07:01  -  07 Mar 2025 08:40  --------------------------------------------------------  IN: 71.4 mL / OUT: 0 mL / NET: 71.4 mL      Daily     Daily     PHYSICAL EXAMINATION:  General: Intubated, unresponsive to painful stimulus  HEENT: Moist mucous membranes.  Respiratory: Intubated, reduced breath sounds R-sided lung fields.  CV: tachycardic rate 130s, S1S2, no murmurs, rubs or gallops. No JVD. Distal pulses intact.  Abdominal: Soft, nontender, nondistended, no rebound or guarding, normal bowel sounds.  Neurology: unresponsive, no response to painful stimulus, R pupil 5mm nonreactive, L pupil 7mm nonreactive.  Extremities: (+) 2+ pitting edema b/l UE and LE, + Peripheral pulses.    LABS:  ABG - ( 07 Mar 2025 00:31 )  pH, Arterial: 7.24  pH, Blood: x     /  pCO2: 46    /  pO2: 134   / HCO3: 20    / Base Excess: -7.4  /  SaO2: 99.0                                    9.0    44.97 )-----------( 328      ( 07 Mar 2025 00:42 )             29.1     03-07    139  |  105  |  71[H]  ----------------------------<  169[H]  4.8   |  16[L]  |  6.24[H]    Ca    6.8[L]      07 Mar 2025 00:42  Phos  7.6     03-07  Mg     2.4     03-07    TPro  4.2[L]  /  Alb  1.9[L]  /  TBili  1.1  /  DBili  x   /  AST  2364[H]  /  ALT  569[H]  /  AlkPhos  117  03-07    LIVER FUNCTIONS - ( 07 Mar 2025 00:42 )  Alb: 1.9 g/dL / Pro: 4.2 g/dL / ALK PHOS: 117 U/L / ALT: 569 U/L / AST: 2364 U/L / GGT: x           PT/INR - ( 06 Mar 2025 00:44 )   PT: 24.5 sec;   INR: 2.15 ratio         PTT - ( 06 Mar 2025 00:44 )  PTT:31.5 sec        Urinalysis Basic - ( 07 Mar 2025 00:42 )    Color: x / Appearance: x / SG: x / pH: x  Gluc: 169 mg/dL / Ketone: x  / Bili: x / Urobili: x   Blood: x / Protein: x / Nitrite: x   Leuk Esterase: x / RBC: x / WBC x   Sq Epi: x / Non Sq Epi: x / Bacteria: x        TELEMETRY:     EKG:     IMAGING:   ====================ASSESSMENT ==============  75yo M w/ pmhx HTN, HLD, nonischemic cardiomyopathy, chronic systolic heart failure s/p HM2 LVAD (6/2017), s/p heart transplant from Hep. 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 Found to have septic shock, (2/19 BCx pseudo, 2/22 BCx NGTD). Still episodes of hypotension. Worsening NORMAN. Accepted to MICU for CRRT.    Plan:  ====================== NEUROLOGY=====================  # Metabolic encephalopathy  - Likely multi-factorial etiologies: septic vs uremic vs hypercapnic, less likely meningitis  - Patient baseline AOx3. AOx1 at ED arrival, iso of infection vs metabolic derangements  - CTH and angio in ED w/o hemorrhage or stenosis   - spot EEG 2/21: negative for seizures  - s/p PEA arrest (3/4) with approx. 30min downtime  - Currently A&Ox0, unresponsive to pain, pupils fixed and dilated  - CT head (3/5): negative for bleed or lesions  - 24hr EEG (3/6): negative for seizures  - febrile Tmax 104F overnight (3/5) -> tylenol PRN q6hr, cooling blanket  - f/u neuron specific enolase  - plan for MRI brain w/o con 3/9, per neuro recs  - Neuro following  - Monitor mental status per protocol  - s/p precedex gtt 1.5 for agitation (off 3/4)  - s/p seroquel 25mg PO q6hr per psych recs, (off 3/5)    ==================== RESPIRATORY======================  - intubated (3/4) s/p cardiac arrest  - 500/6/22/40%  - CXR with R-sided moderate effusion (3/5)    ====================CARDIOVASCULAR==================  # Septic shock from pseudomonas bacteremia  - POCUS reveals no evidence of  fluid overload  - s/p fluid resuscitation    #PEA arrest (3/4)  - approx. 30min downtime  - Pressors: levo 0.4 (3/4- )  - lactate 7.4, now downtrending    # h/o heart transplant  - Nonischemic cardiomyopathy, chronic systolic heart failure s/p HM2 LVAD (6/2017), s/p heart transplant from Hep. C donor (treated) 2/23/18  - Intolerant of Cellcept due to leukopenia  - hold home tacro, per heart failure recs  - c/w home prednisone 15mg daily  - c/w Atovaquone for ppx  - c/w nystatin for ppx  - hold home ASA    # Paroxysmal atrial fibrillation.   - Home regimen : Eliquis 5 mg PO BID at home for hx of of afib and IJ thrombi  - EKG on admission - NSR  - hold lopressor 25 BID (3/4)  - s/p heparin gtt  - hold home ASA    # Hypertension (chronic)  - Holding home Losartan 75 mg PO QD iso shock    # Vascular disorder of lower extremity   - Extremities appear cool and dry   - c/w local wound care by podiatry     ===================HEMATOLOGIC/ONC ===================  # Hx of hemolytic anemia, Nicole Syndrome  - Follows with Dr. Rosario as outpatient  - Prednisone 15 mg PO QD --> switched to Solu-cortef 75mg IV q8hr (2/26-3/3) > back to prednisone 15mg (3/3)  - f/u hematology/oncology recs  - Monitor H&H daily  - f/u hemolysis labs and peripheral smear    # DVT ppx: hold home ASA 81mg    ===================== RENAL =========================  # NORMAN on CKD  # ATN 2/2 septic shock  - Scr ranged from 1.3-2.1 in 2023. Most recent Scr was 1.75 on 1/30/24. On admission, Scr was 1.9 >>> 6  - No improvement with IVF, likely ATN i/s/o contrast/medications  - CRRT stopped 3/3 8pm with initial plan for intermittent HD  - trial bumex 4mg IV x1 (3/4) -> no output, bladder scan 3/5 with 180cc urine  - pt on levo s/p cardiac arrest 3/4, likely unable to tolerate HD/CRRT at this time  - sodium bicarb 1300mg TID  - Nephro following    # Hyperkalemia (resolved)   - K 6.2 at ED arrival  - Shifted in the ED, no EKG changes consistent with severe hyper K. Cr not significantly elevated form baseline, c/f RTA from bactrim and Tacro. Stable   - s/p Lokelma 10mg BID for 3 days  - dialysis as above    ==================== GASTROINTESTINAL===================  # Ileus, resolved  #+/- coffee ground emesis, resolved  - 1 episode of coffee ground emesis, unclear if true GIB  - CTAP (2/22) ileus vs partial SBO - cannot r/o GI bleed  - c/w PPI IV 40 BID  - feeds held 3/4 s/p cardiac arrest  - rising AST/ALT, likely shock liver i/s/o cardiac arrest    =======================    ENDOCRINE  =====================  # DMT2  - A1c 5.8  - ISS    ========================INFECTIOUS DISEASE================  # Septic shock   # Hypothermia  # bacteremia  - Presented with T 91.4F, WBC 21 concerning for possible occult infection,   - U/A without LE or Nitrites, CXR without clear consolidation, MRSA negative Lower concern for meningitis at this moment  - 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 No manifest signs of bowel ischemia. Interval worsening of right lower lobe and right middle lobe atelectasis secondary to elevated right hemidiaphragm when compared to prior CT dated 7/11/2023. Superimposed infection in the collapsed lungs is not excluded  - meropenem (2/21-2/24), restarted meropenem for CNS coverage (2/28 - )  - s/p vancomycin for CNS coverage (2/28 - 3/4)  - s/p zosyn (2/24 - 2/28)  - MRSA swab negative  - hold home Valganciclovir for ppx --> switch to ganciclovir ppx  - f/u infectious labs - Toxoplasma, EBV, Fungitell, Galactomannan, CMV, Cryptococcus, MRSA, ASCENCION virus, CMV, Crypto, ASCENCION virus  - f/u GI PCR, consider C. Diff testing   - Transplant ID recommendations         Patient requires continuous monitoring with bedside rhythm monitoring, pulse ox monitoring, and intermittent blood gas analysis. Care plan discussed with ICU care team. Patient remained critical and at risk for life threatening decompensation.  Patient seen, examined and plan discussed with CCU team during rounds.     I have personally provided ____ minutes of critical care time excluding time spent on separate procedures, in addition to initial critical care time provided by the CICU Attending, Dr. Santiago.     By signing my name below, I, Kaya Villegas, attest that this documentation has been prepared under the direction and in the presence of SHELLY Petty.   Electronically signed: Ab Buckner, 03-07-25 @ 20:49    I, Angie Son , personally performed the services described in this documentation. all medical record entries made by the ab were at my direction and in my presence. I have reviewed the chart and agree that the record reflects my personal performance and is accurate and complete  Electronically signed: SHELLY Petty.      Angie Son PA-C  Contact via Snapt    CCU PROGRESS NOTE:    BILLY RUSSELL  MRN-[id number]  Patient is a 74y old  Male who presents with a chief complaint of Lethargy (07 Mar 2025 20:46)    INTERVAL HPI/OVERNIGHT EVENTS: No acute events overnight.    SUBJECTIVE: Patient seen and examined at bedside. Unable to assess ROS 2/2 mental status.    MEDICATIONS  (STANDING):  atovaquone  Suspension 1500 milliGRAM(s) Oral daily  chlorhexidine 0.12% Liquid 15 milliLiter(s) Oral Mucosa every 12 hours  chlorhexidine 2% Cloths 1 Application(s) Topical daily  cholecalciferol 1000 Unit(s) Oral daily  ganciclovir IVPB 115 milliGRAM(s) IV Intermittent <User Schedule>  insulin lispro (ADMELOG) corrective regimen sliding scale   SubCutaneous every 6 hours  meropenem  IVPB 1000 milliGRAM(s) IV Intermittent every 24 hours  norepinephrine Infusion 0.7 MICROgram(s)/kG/Min (119 mL/Hr) IV Continuous <Continuous>  nystatin    Suspension 533818 Unit(s) Oral four times a day  pantoprazole  Injectable 40 milliGRAM(s) IV Push two times a day  predniSONE   Tablet 15 milliGRAM(s) Oral daily  senna 2 Tablet(s) Oral daily  sodium bicarbonate 650 milliGRAM(s) Oral every 8 hours    MEDICATIONS  (PRN):    OBJECTIVE:  ICU Vital Signs Last 24 Hrs  T(C): 37.1 (07 Mar 2025 23:00), Max: 38.7 (07 Mar 2025 16:30)  T(F): 98.7 (07 Mar 2025 23:00), Max: 101.7 (07 Mar 2025 16:30)  HR: 122 (08 Mar 2025 03:33) (122 - 156)  ABP: 101/41 (08 Mar 2025 03:00) (88/33 - 150/63)  ABP(mean): 55 (08 Mar 2025 03:00) (46 - 87)  RR: 22 (08 Mar 2025 03:00) (12 - 22)  SpO2: 99% (08 Mar 2025 03:33) (94% - 100%)    O2 Parameters below as of 08 Mar 2025 03:00  Patient On (Oxygen Delivery Method): ventilator    O2 Concentration (%): 40      Mode: AC/ CMV (Assist Control/ Continuous Mandatory Ventilation), RR (machine): 22, TV (machine): 500, FiO2: 40, PEEP: 6, ITime: 1    I&O's Summary    06 Mar 2025 07:01  -  07 Mar 2025 07:00  --------------------------------------------------------  IN: 1593.1 mL / OUT: 0 mL / NET: 1593.1 mL    07 Mar 2025 07:01  -  08 Mar 2025 03:37  --------------------------------------------------------  IN: 1029.4 mL / OUT: 0 mL / NET: 1029.4 mL    CAPILLARY BLOOD GLUCOSE    PHYSICAL EXAMINATION:  General: Intubated, unresponsive to painful stimulus  HEENT: Moist mucous membranes.  Respiratory: Intubated, reduced breath sounds R-sided lung fields.  CV: tachycardic rate 130s, S1S2, no murmurs, rubs or gallops. No JVD. Distal pulses intact.  Abdominal: Soft, nontender, nondistended, no rebound or guarding, normal bowel sounds.  Neurology: unresponsive, no response to painful stimulus, R pupil 5mm nonreactive, L pupil 7mm nonreactive.  Extremities: (+) 2+ pitting edema b/l UE and LE, + Peripheral pulses.    ============================I/O===========================   I&O's Detail    06 Mar 2025 07:01  -  07 Mar 2025 07:00  --------------------------------------------------------  IN:    Enteral Tube Flush: 195 mL    Norepinephrine: 373.8 mL    Norepinephrine: 214.2 mL    Norepinephrine: 810.1 mL  Total IN: 1593.1 mL    OUT:  Total OUT: 0 mL    Total NET: 1593.1 mL      07 Mar 2025 07:01  -  08 Mar 2025 03:37  --------------------------------------------------------  IN:    Norepinephrine: 1029.4 mL  Total IN: 1029.4 mL    OUT:  Total OUT: 0 mL    Total NET: 1029.4 mL        ============================ LABS =========================                        8.4    36.58 )-----------( 255      ( 08 Mar 2025 01:11 )             26.2     03-08    141  |  107  |  84[H]  ----------------------------<  148[H]  4.7   |  15[L]  |  7.17[H]    Ca    6.3[LL]      08 Mar 2025 01:11  Phos  7.0     03-08  Mg     2.4     03-08    TPro  4.1[L]  /  Alb  1.8[L]  /  TBili  1.6[H]  /  DBili  x   /  AST  1091[H]  /  ALT  310[H]  /  AlkPhos  190[H]  03-08                LIVER FUNCTIONS - ( 08 Mar 2025 01:11 )  Alb: 1.8 g/dL / Pro: 4.1 g/dL / ALK PHOS: 190 U/L / ALT: 310 U/L / AST: 1091 U/L / GGT: x             ABG - ( 08 Mar 2025 00:59 )  pH, Arterial: 7.33  pH, Blood: x     /  pCO2: 31    /  pO2: 162   / HCO3: 16    / Base Excess: -8.7  /  SaO2: 100.0             Blood Gas Arterial, Lactate: 2.5 mmol/L (03-08-25 @ 00:59)  Blood Gas Arterial, Lactate: 2.3 mmol/L (03-07-25 @ 00:31)  Blood Gas Arterial, Lactate: 4.8 mmol/L (03-06-25 @ 00:41)  Blood Gas Arterial, Lactate: 6.7 mmol/L (03-05-25 @ 14:40)  Blood Gas Arterial, Lactate: 5.5 mmol/L (03-05-25 @ 12:13)  Blood Gas Arterial, Lactate: 7.4 mmol/L (03-05-25 @ 10:50)    Urinalysis Basic - ( 08 Mar 2025 01:11 )    Color: x / Appearance: x / SG: x / pH: x  Gluc: 148 mg/dL / Ketone: x  / Bili: x / Urobili: x   Blood: x / Protein: x / Nitrite: x   Leuk Esterase: x / RBC: x / WBC x   Sq Epi: x / Non Sq Epi: x / Bacteria: x      ======================MICRO/RAD/CARDIO=================  Telemetry: Reviewed   EKG: Reviewed  CXR: Reviewed  Culture: Reviewed   Echo: Limited Transthoracic Echo:   Patient name: BILLY RUSSELL  YOB: 1950   Age: 72 (M)   MR#: [id number]  Study Date: 3/17/2023  Location: [address] [place], [place] [id number]: Greg Mckeon RDCS  Study quality: Technically fair  Referring Physician: Brittany Trevizo MD  Blood Pressure: 116/73 mmHg  Height: 170 cm  Weight: 73 kg  BSA: 1.8 m2  Heart Rate: 99 mmHg  ------------------------------------------------------------------------  PROCEDURE: Limited transthoracic echocardiogram with 2-D.  M-Mode and spectral and color flow Doppler.  INDICATION: Abnormal electrocardiogram (ECG) (EKG) (R94.31)  ------------------------------------------------------------------------  Dimensions:    Normal Values:  LA:            2.0 - 4.0 cm  Ao:            2.0 - 3.8 cm  SEPTUM: 1.1    0.6 - 1.2 cm  PWT:    1.1    0.6 - 1.1 cm  LVIDd:  3.7    3.0 - 5.6 cm  LVIDs:  2.5    1.8 - 4.0 cm  Derived variables:  LVMI: 70 g/m2  RWT: 0.59  Fractional short: 32 %  EF (Sherman Rule): 72 %  ------------------------------------------------------------------------  Observations:  Aortic Valve/Aorta:  Aortic Root: 3.5 cm.  Left Ventricle: Hyperdynamic left ventricular systolic  function. Normal left ventricular internal dimensions and  wall thicknesses.  Right Heart: Normal right atrium. Right ventricular  enlargement with normal right ventricular systolic  function. The RV measures 4.5cm at the base and 3.3cm mid  RV.  Pericardium/Pleura: Normal pericardium with no pericardial  effusion.  ------------------------------------------------------------------------  Conclusions:  1. Hyperdynamic left ventricular systolic function.  2. Right ventricular enlargement with normal right  ventricular systolic function. The RV measures 4.5cm at the  base and 3.3cm mid RV.  *** Compared with echocardiogram of3/3/2023, no  significant changes noted. There is improved RV  visualization in the current study.  ------------------------------------------------------------------------  Confirmed on  3/17/2023 - 16:29:36 by Shar Whitney M.D.  ------------------------------------------------------------------------ (03-17-23 @ 13:34)    Cath:

## 2025-03-07 NOTE — PROGRESS NOTE ADULT - ASSESSMENT
73 year old male PMH HTN, HLD, CKD, nonischemic cardiomyopathy, chronic systolic heart failure s/p HM2 LVAD (6/2017), s/p heart transplant from Hep. 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 Superimposed infection in the collapsed lungs is not excluded.    CT abdomen pelvis (February 22) Since prior study 2/22/2025 interval development of diffusely dilated small bowel loops with air-fluid levels. Contrast from prior study has passed into the colon. Findings are favored to represent ileus or less likely partial small bowel obstruction rather than mechanical obstruction. No manifest signs of bowel ischemia.    At this point suspect that Pseudomonas bacteremia was secondary to either a respiratory source or gastrointestinal translocation.  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    CT chest/abdomen/pelvis 2/26  Bronchial mucus plugging with right lower lobe atelectasis.  No CT evidence of occult abscess in the abdomen or pelvis.    MR Chest 2/28    IMPRESSION:  1.  Sternal wires are present without osseous fusion.  2.  Marrow appears preserved given the metallic artifact without evidence   for osteomyelitis.  3.  Sternoclavicular joints appear preserved.  4.  Edema of the right pectoralis along the manubrium and clavicle with   surrounding soft tissue edema. Changes may reflect cellulitis and   myositis with the given history versus injury.  5.  No abscess is seen.      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)  agree with empiric meropenem  would also send depp suction sputum from ET tube to see colonizing jose  GOC discussion with family and proxy Tahira    # AMS  # Pseudomonas bacteremia- completed therapy  # NORMAN on CRRT  --Encephalopathy; Likely multifactorial with sepsis now primary , intrabdominal catastrophe suspected  --consider underlying PML given elevated serum ASCENCION virus value,  -- would try to decrease immunosuppression  Cytomegalovirus By PCR: NotDetec IU/mL (03.01.25 @ 02:16), serum    # Heart Transplant Recipient, Prophylactic Antibiotic  --Continue Atovaquone 1,500 mg PO Q24H for PCP PPx  --Continue Valcyte 450 mg M/W/F for CMV PPx, will change to HD dosing once dialysis started    prognosis poor      Gary Lujan MD  Can be called via Teams  After 5pm/weekends [phone]

## 2025-03-07 NOTE — PROGRESS NOTE ADULT - ATTENDING COMMENTS
History of heart transplant in 2018  Admitted with encephalopathy of unknown origin  Had a PEA arrest on 3/4  ROSC was achieved multiple times but during attempted airway intubation he was intubated in his esophagus   Reportedly he remained that way for several minutes before being successfully intubated tracheally  No sedation, minimal brainstem reflexes, head CT benign - f/u Neuro recs  Vasodilatory shock requiring Levophed infusion post arrest; lactate elevated but downtrending - wean as able  Continue outpatient Prednisone along with outpatient Tacro  TTE with preserved LVEF  Tolerating tube feeds  Oliguric NORMAN; I feel he is too unstable to tolerate either iHD or CVVH   H/H low but acceptable on Heparin drip for afib   Afebrile, on empiric Meropenem and Vancomycin, cultures negative  Sugars controlled  SAMEERA Pickard 3/3  Prognosis is poor - as has been discussed with the family, I think further aggressive care is unfortunately medically futile and therefore care is capped. After further discussions today, patient is now DNR. Further discussions will be ongoing re: comfort measures.

## 2025-03-07 NOTE — PROGRESS NOTE ADULT - ASSESSMENT
Anoxic encephalopathy  HTN  Nicole syndrome s/p heart transplant 2018  Atrial fibrillation/flutter on Eliquis  Gout  R cerebellar stroke  Sepsis  NORMAN  Transaminitis    - Patient initially with R cerebellar stroke and encephalopathy of unclear etiology for possible due to cardiac event or other toxic/metabolic causes. He had a cardiac arrest on 3/4 at ~22:00 with total time until ROSC approximately 30 minutes but also initial esophageal intubation until this was corrected. Briefly given sedation but this was stopped. No abnormal movements or focal neurologic deficits noted. He is connected to vEEG at this time with diffuse attenuation and likely artifact but await official read. CT head, personally reviewed by me, with anterior inferior > superior sulcal effacement and blurring of gray-white junction likely reflecting hypoxic-anoxic damage but no other acute intracranial findings. 3/7 - No appreciable change in medical or neurologic condition. No plans for CRRT or HD at this time. Remains on vEEG with diffuse suppression off sedation likely due to cortical cell death  - vEEG with diffuse suppression and no evidence for focal slowing, epileptiform discharges, or seizures. PLEASE STOP  - Consider MRI brain w/o on 3/9 to better visualize hypoxic/anoxic damage, if in line with GOC  - Neuron specific enolase (NSE) to aid in neurologic prognosis on 3/6 PM (24-48 hours s/p arrest) - DRAWN - and 3/7 PM (48-78 hours s/p arrest)  - Continue to minimize/hold sedation as able (Precedex, fentanyl, hydromorphone OK)  - Above recommendations discussed with CCU team, who verbalized agreement and understanding  - Continue to address above medical issues, as you are doing  - Will continue to follow patient with you, as needed

## 2025-03-07 NOTE — PROGRESS NOTE ADULT - SUBJECTIVE AND OBJECTIVE BOX
NEUROLOGY FOLLOW-UP CONSULT NOTE    RFC: R cerebellar stroke, neurologic prognosis s/p cardiac arrest    Interval history: No acute neurologic events overnight. Patient remains connected to vEEG at bedside and off sedation. No significant change in overall medical and neurologic condition.    Meds:  MEDICATIONS  (STANDING):  atovaquone  Suspension 1500 milliGRAM(s) Oral daily  chlorhexidine 0.12% Liquid 15 milliLiter(s) Oral Mucosa every 12 hours  chlorhexidine 2% Cloths 1 Application(s) Topical daily  cholecalciferol 1000 Unit(s) Oral daily  ganciclovir IVPB 115 milliGRAM(s) IV Intermittent <User Schedule>  insulin lispro (ADMELOG) corrective regimen sliding scale   SubCutaneous every 6 hours  meropenem  IVPB 1000 milliGRAM(s) IV Intermittent every 24 hours  norepinephrine Infusion 0.7 MICROgram(s)/kG/Min (119 mL/Hr) IV Continuous <Continuous>  nystatin    Suspension 656591 Unit(s) Oral four times a day  pantoprazole  Injectable 40 milliGRAM(s) IV Push two times a day  predniSONE   Tablet 15 milliGRAM(s) Oral daily  senna 2 Tablet(s) Oral daily  sodium bicarbonate 650 milliGRAM(s) Oral every 8 hours    MEDICATIONS  (PRN):    GTT:  Norepinephrine 0.4 mcg/kg/min    PMHx/PSHx/FHx/SHx:  Hyperkalemia    Acute embolism and thrombosis of deep veins of upper extremity, bilateral    Acute embolism and thrombosis of unspecified deep veins of unspecified lower extremity    Acute kidney failure, unspecified    Age-related osteoporosis without current pathological fracture    Anemia, unspecified    Anorexia    Bunion of left foot    Bunion of right foot    Chest pain, unspecified    Chronic gout, unspecified, with tophus (tophi)    Chronic kidney disease, stage 3 unspecified    Cold autoimmune hemolytic anemia    Constipation, unspecified    Coronary artery aneurysm    Edema, unspecified    Elevated prostate specific antigen [PSA]    Encounter for aftercare following heart transplant    Encounter for general adult medical examination without abnormal findings    Encounter for immunization    Enterocolitis due to Clostridium difficile, not specified as recurrent    Essential (primary) hypertension    Nicole syndrome    Gastro-esophageal reflux disease without esophagitis    Gout, unspecified    Hallux valgus (acquired), left foot    Hallux valgus (acquired), right foot    Heart transplant rejection    Heart transplant status    History of Diabetes type 2, uncontrolled    History of Wound, open, finger    Hyperglycemia, unspecified    Hyperkalemia    Hypertensive chronic kidney disease with stage 1 through stage 4 chronic kidney disease, or unspecified chronic kidney disease    Immune thrombocytopenic purpura    Immunodeficiency, unspecified    Long term (current) use of aspirin    Long term (current) use of systemic steroids    Malignant neoplasm of unspecified choroid    Melanocytic nevi, unspecified    Mixed type autoimmune hemolytic anemia    Other disorders resulting from impaired renal tubular function    Other hammer toe(s) (acquired), left foot    Other hammer toe(s) (acquired), right foot    Other seborrheic keratosis    Other specified disorders of bone density and structure, unspecified site    Other specified disorders of the skin and subcutaneous tissue    Other specified personal risk factors, not elsewhere classified    Pain in left foot    Pain in right foot    Paroxysmal atrial fibrillation    Peripheral vascular disease, unspecified    Personal history of nicotine dependence    Personal history of other diseases of the circulatory system    Personal history of other diseases of the digestive system    Personal history of other infectious and parasitic diseases    Presence of automatic (implantable) cardiac defibrillator    Shortness of breath    Tachycardia, unspecified    Tinea unguium    Unilateral primary osteoarthritis, right knee    Unilateral primary osteoarthritis, unspecified knee    Unspecified chronic gastritis without bleeding    Unspecified mycosis    Unspecified severe protein-calorie malnutrition    Unspecified viral hepatitis C without hepatic coma    Ventricular fibrillation    Atelectasis    Atherosclerotic heart disease of native coronary artery without angina pectoris    Chronic kidney disease, unspecified    Contact with and (suspected) exposure to covid-19    Encounter for other preprocedural examination    ESSENTIAL (PRIMARY)    HEART TRANSPLANT STA    Hereditary hemolytic anemia, unspecified    CHF (Congestive Heart Failure)    HTN    SVT (Supraventricular Tachycardia)    Non-Ischemic Cardiomyopathy    PAF (paroxysmal atrial fibrillation)    Ventricular fibrillation    H/O prior ablation treatment    GIB (gastrointestinal bleeding)    Hepatitis C virus    DVT of upper extremity (deep vein thrombosis)    Former smoker    HLD (hyperlipidemia)    Knee pain, right    H/O autoimmune hemolytic anemia    H/O hemolytic anemia    Atrial fibrillation    Hyperkalemia    Acute respiratory acidosis    Hyperkalemia    Stage 4 chronic kidney disease    Stage 3 chronic kidney disease    Metabolic encephalopathy    Systemic inflammatory response syndrome (SIRS)    Hyperkalemia    Need for prophylactic measure    H/O heart transplant    AMS (altered mental status)    NORMAN (acute kidney injury)    Status post heart transplant    Immunosuppression    Prophylactic antibiotic    Deep vein thrombosis (DVT)    Hypertension    DM (diabetes mellitus)    Anemia, hemolytic    Swollen arm    Hemolytic anemia    Paroxysmal atrial fibrillation    Diabetes type 2    Acute kidney injury superimposed on CKD    Vascular disorder of lower extremity    Coffee ground emesis    Ileus    Septic shock    Insertion, PICC, using imaging guidance, older than 5 years    Ultrasound guided venous access    AICD (Automatic Cardioverter/Defibrillator) Present    History of Prior Ablation Treatment    Hypertension    Hypertension    Status post left hip replacement    LVAD (left ventricular assist device) present    H/O heart transplant    S/P right heart catheterization    SLURRED SPEECH    90+    Hypothermia    Sepsis    [email or web address]        Allergies:  No Known Allergies      ROS: Due to clinical condition unable to assess (GEGE)    O:  T(C): 37.8 (03-07-25 @ 10:45), Max: 37.8 (03-07-25 @ 10:45)  HR: 150 (03-07-25 @ 13:00) (122 - 153)  BP: --  RR: 12 (03-07-25 @ 13:00) (12 - 28)  SpO2: 99% (03-07-25 @ 13:00) (94% - 100%)    Focused neurologic exam:  MS - Intubated, not sedated, comatose, no speech output, does not follow commands. GEGE orientation, rep/naming, attn/conc/recent and remote memory/fund of knowledge  CN - Pupils surgical and not reactive b/l, EOM's with minimal R eye movement by OCR, (-) face sens/str by corneals b/l, (-) spontaneous respirations (vent rate 12 bpm), (-) cough. GEGE VF, hearing, tongue/palate, trap/SCM  Motor - Normal bulk and flaccid tone throughout. No spontaneous movements noted. No grimace or withdrawal to strong tactile stimuli in any extremity  Sens - As per Motor above  DTR's - 2+ BR b/l, 0+ rest, and neutral b/l plantar response  Coord - GEGE  Gait and station - GEGE    Pertinent labs/studies:  CBC with inc WBC 44.96, low H/H 9/29 and inc , otherwise essentially WNL  PT/INR inc 24.5/2.15, PTT WNL  BMP with inc BUN/Cr 71/6.24 (GFR dec 9), otherwise essentially WNL  Albumin dec 1.9, LFT's with inc ALT//2364  Mg WNL, Phos inc 7.6  NH3 WNL, A1C inc 5.8%, B12/folate WNL, WNV (-), B6 not able to calculated, Lyme (-), syphilis serology TP (-)  Tacrolimus level - 6.9    vEEG 3/7 -  EEG Classification / Summary:  Abnormal EEG in a comatose patient.  Diffusely suppressed background.  No focal or epileptiform abnormalities are captured.   No seizures.    Clinical Impression:  Severe diffuse cerebral dysfunction is nonspecific in etiology.   No epileptiform abnormalities or seizures.  Tachycardia on single-lead EKG.    < from: CT Head No Cont (03.05.25 @ 09:33) >    No evidence of acute hemorrhage mass or mass effect.    < end of copied text >

## 2025-03-08 NOTE — PROGRESS NOTE ADULT - CRITICAL CARE ATTENDING COMMENT
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 Now DNR. Pressors capped but levo actually able to decrease now .22. Hypothermic at times. Anuric off CRRT. No sedation no reflexes. Continuing prednisone, bicarb, mepron, ganciclovir, meropenim and vanco. Stop blood draws and discuss with proxy. 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 Now DNR. Pressors capped but levo actually able to decrease now .22. Hypothermic at times. Anuric off CRRT. No sedation no reflexes. Continuing prednisone, bicarb, mepron, ganciclovir, meropenim and vanco. Stop blood draws and discuss with proxy.  Live[facility] at bedside, reviewed criteria and Dr. Gonzales now present for declaration brain death. Liver possible donation,

## 2025-03-08 NOTE — GOALS OF CARE CONVERSATION - ADVANCED CARE PLANNING - CONVERSATION/DISCUSSION
Diagnosis/Prognosis/Treatment Options
Diagnosis/Prognosis/MOLST Discussed/Treatment Options
Diagnosis/Prognosis/MOLST Discussed/Treatment Options

## 2025-03-08 NOTE — PROGRESS NOTE ADULT - SUBJECTIVE AND OBJECTIVE BOX
CICU DAY NOTE  Admission date: 2/20/25  Chief complaint/ Diagnosis: Septic shock   HPI: 73yo M pmhx HTN, HLD, nonischemic cardiomyopathy, chronic systolic heart failure s/p HM2 LVAD (6/2017), s/p heart transplant from Hep. 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 On the phone, the patient's godbrother mentioned that on 2/18, the patient was in the car with his godbrother and was disoriented asking to get off on the road 4 blocks before his house. In addition, a caretaker stated that when she spoke to Mr. Hameed on the phone at 11am on 2/18, he was doing well. However, when the caretaker visited 2 hours later at 1pm, the patient was disoriented and felt his legs were heavy. This prompted the patient to come to the hospital.   In the ED: Hypothermic 91.4, HR 80, /60, RA   Patient noted to be Hyperkalemic (6.2) with Mixed respiratory and metabolic acidosis pH 7.2. - Patient was given Calcium Gluconate 2g, D50/Insulin 5 unit, 40 mg IV lasix, Lokelma 10mg.   Vancomycin and Zosyn  (20 Feb 2025 07:21)    Interval history: Uneventful overnight    REVIEW OF SYSTEMS  Denies CP, Palpitation, SOB, Dyspnea[ x ] All other systems negative    MEDICATIONS  (STANDING)  atovaquone  Suspension 1500 milliGRAM(s) Oral daily  chlorhexidine 0.12% Liquid 15 milliLiter(s) Oral Mucosa every 12 hours  chlorhexidine 2% Cloths 1 Application(s) Topical daily  cholecalciferol 1000 Unit(s) Oral daily  ganciclovir IVPB 115 milliGRAM(s) IV Intermittent <User Schedule>  insulin lispro (ADMELOG) corrective regimen sliding scale   SubCutaneous every 6 hours  meropenem  IVPB 1000 milliGRAM(s) IV Intermittent every 24 hours  norepinephrine Infusion 0.7 MICROgram(s)/kG/Min (119 mL/Hr) IV Continuous <Continuous>  nystatin    Suspension 145910 Unit(s) Oral four times a day  pantoprazole  Injectable 40 milliGRAM(s) IV Push two times a day  predniSONE   Tablet 15 milliGRAM(s) Oral daily  senna 2 Tablet(s) Oral daily  sodium bicarbonate 650 milliGRAM(s) Oral every 8 hours    Allergy  No Known Allergies    ICU Vital Signs Last 24 Hrs  T(C): 37.1 (07 Mar 2025 23:00), Max: 38.7 (07 Mar 2025 16:30)  T(F): 98.7 (07 Mar 2025 23:00), Max: 101.7 (07 Mar 2025 16:30)  HR: 124 (08 Mar 2025 05:00) (122 - 156)  ABP: 104/43 (08 Mar 2025 05:00) (90/33 - 150/63)  ABP(mean): 57 (08 Mar 2025 05:00) (49 - 87)  RR: 22 (08 Mar 2025 05:00) (12 - 22)  SpO2: 99% (08 Mar 2025 05:00) (94% - 100%) on MV 40%     Mode: AC/ CMV (Assist Control/ Continuous Mandatory Ventilation)  RR (machine): 22TV (machine): 455IrM3: 40PEEP: 6    I&O's Summary  IN: 1086.2 mL / OUT: 0 mL / NET: 1086.2 mL    PHYSICAL EXAM  Appearance: Normal, NAD  HEAD:  Normocephalic  EYES: PERRLA, conjunctiva and sclera clear  NECK: Supple, No JVD  CHEST/LUNG: Clear to auscultation bilaterally; No wheezing  HEART: Normal S1, S2. No murmurs, rubs, or gallops  ABDOMEN: + Bowel sounds, Soft, NT, ND   EXTREMITIES:  2+ Peripheral Pulses, No clubbing, cyanosis, or edema  NEUROLOGY: non-focal, aaox3  SKIN: No rashes or lesions    Interpretation of Telemetry:                             8.4  <9.0  36.58 <44)-----------( 255                          26.2     141  |  107  |  84<71  ----------------------------<  148[H]  4.7   |  15[L]  |  7.17<6.24    Ca    6.3[LL]     Phos  7.0     Mg     2.4       TPro  4.1[L]  /  Alb  1.8[L]  /  TBili  1.6[H]  /  DBili  x   /  AST  1091[H]  /  ALT  310[H]  /  AlkPhos  190[H]     ABG - ( 08 Mar 2025 00:59 )  pH, Arterial: 7.33   /  pCO2: 31    /  pO2: 162   / HCO3: 16    / Base Excess: -8.7  /  SaO2: 100.0   F/S 131-152         CICU DAY NOTE  Admission date: 2/20/25  Chief complaint/ Diagnosis: Septic shock   HPI: 75yo M pmhx HTN, HLD, nonischemic cardiomyopathy, chronic systolic heart failure s/p HM2 LVAD (6/2017), s/p heart transplant from Hep. 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 On the phone, the patient's godbrother mentioned that on 2/18, the patient was in the car with his godbrother and was disoriented asking to get off on the road 4 blocks before his house. In addition, a caretaker stated that when she spoke to Mr. Hameed on the phone at 11am on 2/18, he was doing well. However, when the caretaker visited 2 hours later at 1pm, the patient was disoriented and felt his legs were heavy. This prompted the patient to come to the hospital.   In the ED: Hypothermic 91.4, HR 80, /60, RA   Patient noted to be Hyperkalemic (6.2) with Mixed respiratory and metabolic acidosis pH 7.2. - Patient was given Calcium Gluconate 2g, D50/Insulin 5 unit, 40 mg IV lasix, Lokelma 10mg.   Vancomycin and Zosyn  (20 Feb 2025 07:21)    Interval history: No response to painful stimuli  Currently on Levo 0.022    MEDICATIONS  (STANDING)  atovaquone  Suspension 1500 milliGRAM(s) Oral daily  cholecalciferol 1000 Unit(s) Oral daily  ganciclovir IVPB 115 milliGRAM(s) IV Intermittent <User Schedule>  insulin lispro (ADMELOG) corrective regimen sliding scale   SubCutaneous every 6 hours  meropenem  IVPB 1000 milliGRAM(s) IV Intermittent every 24 hours  norepinephrine Infusion 0.7 MICROgram(s)/kG/Min (119 mL/Hr) IV Continuous <Continuous>  nystatin    Suspension 310253 Unit(s) Oral four times a day  pantoprazole  Injectable 40 milliGRAM(s) IV Push two times a day  predniSONE   Tablet 15 milliGRAM(s) Oral daily  senna 2 Tablet(s) Oral daily  sodium bicarbonate 650 milliGRAM(s) Oral every 8 hours    Allergy  No Known Allergies    ICU Vital Signs Last 24 Hrs  T(C): 37.1 (07 Mar 2025 23:00), Max: 38.7 (07 Mar 2025 16:30)  T(F): 98.7 (07 Mar 2025 23:00), Max: 101.7 (07 Mar 2025 16:30)  HR: 124 (08 Mar 2025 05:00) (122 - 156)  ABP: 104/43 (08 Mar 2025 05:00) (90/33 - 150/63)  ABP(mean): 57 (08 Mar 2025 05:00) (49 - 87)  RR: 22 (08 Mar 2025 05:00) (12 - 22)  SpO2: 99% (08 Mar 2025 05:00) (94% - 100%) on MV 40%     Mode: AC/ CMV (Assist Control/ Continuous Mandatory Ventilation)  RR (machine): 22 TV (machine): 500 FiO2: 40 PEEP: 6    I&O's Summary  IN: 1086.2 mL / OUT: 0 mL / NET: 1086.2 mL    PHYSICAL EXAM  Appearance: Normal, NAD  HEAD:  Normocephalic  EYES: fixed pupils conjunctiva and sclera clear  NECK: Supple, No JVD  CHEST/LUNG: Clear to auscultation bilaterally; No wheezing  HEART: Normal S1, S2. No murmurs, rubs, or gallops  ABDOMEN: + Bowel sounds, Soft, NT, ND   EXTREMITIES:  2+ Peripheral Pulses, No clubbing, cyanosis, or edema  NEUROLOGY: no gag/cough, no response from the obnoxious painful stimuli   SKIN: No rashes or lesions    Interpretation of Telemetry:                             8.4  <9.0  36.58 <44)-----------( 255                          26.2     141  |  107  |  84<71  ----------------------------<  148[H]  4.7   |  15[L]  |  7.17<6.24    Ca    6.3[LL]     Phos  7.0     Mg     2.4       TPro  4.1[L]  /  Alb  1.8[L]  /  TBili  1.6[H]  /  DBili  x   /  AST  1091[H]  /  ALT  310[H]  /  AlkPhos  190[H]     ABG - ( 08 Mar 2025 00:59 )  pH, Arterial: 7.33   /  pCO2: 31    /  pO2: 162   / HCO3: 16    / Base Excess: -8.7  /  SaO2: 100.0   F/S 131-152

## 2025-03-08 NOTE — PROGRESS NOTE ADULT - SUBJECTIVE AND OBJECTIVE BOX
[facility] Division of Kidney Diseases & Hypertension  FOLLOW UP NOTE  --------------------------------------------------------------------------------  Chief Complaint:    24 hour events/subjective:    ongoing goals of care discussion        PAST HISTORY  --------------------------------------------------------------------------------  No significant changes to PMH, PSH, FHx, SHx, unless otherwise noted    ALLERGIES & MEDICATIONS  --------------------------------------------------------------------------------  Allergies    No Known Allergies    Intolerances      Standing Inpatient Medications  atovaquone  Suspension 1500 milliGRAM(s) Oral daily  chlorhexidine 0.12% Liquid 15 milliLiter(s) Oral Mucosa every 12 hours  chlorhexidine 2% Cloths 1 Application(s) Topical daily  cholecalciferol 1000 Unit(s) Oral daily  ganciclovir IVPB 115 milliGRAM(s) IV Intermittent <User Schedule>  insulin lispro (ADMELOG) corrective regimen sliding scale   SubCutaneous every 6 hours  meropenem  IVPB 1000 milliGRAM(s) IV Intermittent every 24 hours  norepinephrine Infusion 0.7 MICROgram(s)/kG/Min IV Continuous <Continuous>  nystatin    Suspension 269603 Unit(s) Oral four times a day  pantoprazole  Injectable 40 milliGRAM(s) IV Push two times a day  predniSONE   Tablet 15 milliGRAM(s) Oral daily  senna 2 Tablet(s) Oral daily  sodium bicarbonate 650 milliGRAM(s) Oral every 8 hours    PRN Inpatient Medications      --------------------------------------------------------------------------------  T(C): 37.2 (03-08-25 @ 12:00), Max: 38.7 (03-07-25 @ 16:30)  HR: 135 (03-08-25 @ 13:00) (120 - 156)  BP: --  RR: 22 (03-08-25 @ 13:00) (12 - 22)  SpO2: 100% (03-08-25 @ 13:00) (91% - 100%)  Wt(kg): --    Weight (kg): 90.7 (03-07-25 @ 21:45)      03-07-25 @ 07:01  -  03-08-25 @ 07:00  --------------------------------------------------------  IN: 1127 mL / OUT: 0 mL / NET: 1127 mL    03-08-25 @ 06:01  -  03-08-25 @ 13:26  --------------------------------------------------------  IN: 156.4 mL / OUT: 0 mL / NET: 156.4 mL      Physical Exam:  	not responsive  	Pulm: Decreased breath sounds bilaterally   	LE: Warm, 1+ edema  	  LABS/STUDIES  --------------------------------------------------------------------------------              8.4    36.58 >-----------<  255      [03-08-25 @ 01:11]              26.2     141  |  107  |  84  ----------------------------<  148      [03-08-25 @ 01:11]  4.7   |  15  |  7.17        Ca     6.3     [03-08-25 @ 01:11]      Mg     2.4     [03-08-25 @ 01:11]      Phos  7.0     [03-08-25 @ 01:11]    TPro  4.1  /  Alb  1.8  /  TBili  1.6  /  DBili  x   /  AST  1091  /  ALT  310  /  AlkPhos  190  [03-08-25 @ 01:11]          Creatinine Trend:  SCr 7.17 [03-08 @ 01:11]  SCr 6.24 [03-07 @ 00:42]  SCr 5.11 [03-06 @ 00:44]  SCr 3.95 [03-05 @ 11:00]  SCr 3.24 [03-05 @ 00:51]    Urinalysis - [03-08-25 @ 01:11]      Color  / Appearance  / SG  / pH       Gluc 148 / Ketone   / Bili  / Urobili        Blood  / Protein  / Leuk Est  / Nitrite       RBC  / WBC  / Hyaline  / Gran  / Sq Epi  / Non Sq Epi  / Bacteria         HBsAb <3.0      [03-05-25 @ 06:51]  HBsAg Nonreact      [03-05-25 @ 06:51]  HBcAb Nonreact      [03-05-25 @ 06:51]

## 2025-03-08 NOTE — PROCEDURE NOTE - NSSECUREBY_VASC_A_CORE
stabilization device
stabilization device/sterile occulsive dressing/tape
sterile occulsive dressing

## 2025-03-08 NOTE — PROCEDURE NOTE - NSINDICATIONS_GEN_A_CORE
venous access
dialysis/CRRT
critical illness/hemodynamic monitoring
venous access
arterial puncture to obtain ABG's/critical patient/monitoring purposes
venous access
intestinal obstruction
dialysis/CRRT

## 2025-03-08 NOTE — PROCEDURE NOTE - NSPOSTCAREGUIDE_GEN_A_CORE
Care for catheter as per unit/ICU protocols
Verbal/written post procedure instructions were given to patient/caregiver/Instructed patient/caregiver regarding signs and symptoms of infection/Keep the cast/splint/dressing clean and dry/Care for catheter as per unit/ICU protocols
Care for catheter as per unit/ICU protocols
Verbal/written post procedure instructions were given to patient/caregiver
Care for catheter as per unit/ICU protocols
Verbal/written post procedure instructions were given to patient/caregiver/Instructed patient/caregiver to follow-up with primary care physician/Instructed patient/caregiver regarding signs and symptoms of infection/Keep the cast/splint/dressing clean and dry/Care for catheter as per unit/ICU protocols

## 2025-03-08 NOTE — PROGRESS NOTE ADULT - ASSESSMENT
juan f-atn   Required CRRT 2/23-3/3 iso worsening oliguric renal failure w/ hemodynamic instability.   CRRT stopped on 3/3 in preparation to transition to iHD. However pt w/ PEA arrest overnight 3/4, now intubated and w/ high IV vasopressor requirements (capped).   Labs from today reviewed.  Per discussion with primary team, patient will likely be palliative soon. holding off on CRRT    jyoti abraham  nephrology attending   please contact me on TEAMS   Office- [phone]

## 2025-03-08 NOTE — GOALS OF CARE CONVERSATION - ADVANCED CARE PLANNING - CONVERSATION DETAILS
Initially talked to HCP Tahira regarding patient's prognosis. I discussed patient's very poor prognosis, and shared that there is little to no brain activity yet. She said he would like the patient to be at peace and would not want him to receive any excess measures that cause him suffering, including being currently intubated. Palliative extubation was discussed, and she thought this was appropriate for him. However, she says she is referring to the patient's family regarding decision for palliative extubation. She told me to call the patient's brother, Salty Barahona. I talked to Mr. Barahona, who was revealed to be his godbrother, not biological brother. Prognosis and palliative extubation was discussed. He was not aware that Mr. Baez was in this state. He agreed that palliative extubation would be appropriate. He shared that he would call Mr. Baez's brother Ke and sister Mariposa to come in for potential palliative extubation. I called Mr. Barahona one hour later, and he said the family was all in agreement on Palliative Extubation. When the family arrived, I spoke to Ke, and he wanted to proceed with palliative extubation. [facility] had not spoken to the family yet, and palliative extubation was held until they spoke to the family. [facility] representative came and talked to the family, and they agreed to organ donation.
Pt's mary care proxy Sharon called in to express desire to change pt's code status.  Had an extended discussion with Tahira regarding pt's current clinical condition and continued treatment plan, discussed pt's limited neurologic function, dependence on mechanical ventilation and vasopressors, and worsening kidney function, explaining that pt is not currently a candidate for CRRT or HD.  An explanation of advanced directives and MOLST was provided.  Tahira expresses she has had conversations with pt YVONNE's family members and they have come to an agreement that given pt's poor prognosis they would like to make pt a DNR.  Will update pt's code status to DNR.  Pt currently intubated at the time of this conversation.    Above was reviewed with CICU fellow Dr. Abigail Fonseca and attending physician Dr. Fabian Leigh.
Spoke with pt's mary care proxy Tahira who is a long term family friend of Mr. Baez, discussed pt's current clinical condition and continued treatment plan, discussed pt's persistent altered mental status and agitation. An explanation of advanced directives and MOLST was provided.  Tahira expresses she has had previous conversations with Mr. Baez where he has expressed desires for wanting to be FULL CODE through the end of his life.  Tahira spoke with pt's sister Mariposa and Mr. Baez's other siblings who are also in agreement with keeping pt YVONNE as FULL CODE status.  Mr. BAEZ will remain FULL CODE.    [facility] Abigail Fonseca and attending physician Dr. Fabian Leigh.
none

## 2025-03-08 NOTE — PROCEDURE NOTE - NSPROCDETAILS_GEN_ALL_CORE
location identified, draped/prepped, sterile technique used, needle inserted/introduced/positive blood return obtained via catheter/connected to a pressurized flush line/sutured in place/hemostasis with direct pressure, dressing applied/all materials/supplies accounted for at end of procedure
location identified, draped/prepped, sterile technique used/sterile dressing applied/sterile technique, catheter placed/supine position/ultrasound guidance
location identified, draped/prepped, sterile technique used/sterile dressing applied/sterile technique, catheter placed/supine position/ultrasound guidance
guidewire recovered/lumen(s) aspirated and flushed/sterile dressing applied/sterile technique, catheter placed/ultrasound guidance with use of sterile gel and probe cove
location identified, draped/prepped, sterile technique used/sterile dressing applied/sterile technique, catheter placed/supine position/Trendelenburg position/ultrasound guidance
nasogastric/audible air bolus/placement confirmed by auscultation/gastric secretions aspirated, placement confirmed
guidewire recovered/lumen(s) aspirated and flushed/sterile dressing applied/sterile technique, catheter placed/ultrasound guidance with use of sterile gel and probe cove
guidewire recovered/lumen(s) aspirated and flushed/sterile dressing applied/sterile technique, catheter placed/ultrasound guidance with use of sterile gel and probe cove

## 2025-03-08 NOTE — PROGRESS NOTE ADULT - ASSESSMENT
====================ASSESSMENT ==============  75yo M w/ pmhx HTN, HLD, nonischemic cardiomyopathy, chronic systolic heart failure s/p HM2 LVAD (6/2017), s/p heart transplant from Hep. 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 Found to have septic shock, (2/19 BCx pseudo, 2/22 BCx NGTD). Still episodes of hypotension. Worsening NORMAN. Accepted to MICU for CRRT.    Plan:  ====================== NEUROLOGY=====================  # Metabolic encephalopathy  - Likely multi-factorial etiologies: septic vs uremic vs hypercapnic, less likely meningitis  - Patient baseline AOx3. AOx1 at ED arrival, iso of infection vs metabolic derangements  - CTH and angio in ED w/o hemorrhage or stenosis   - spot EEG 2/21: negative for seizures  - s/p PEA arrest (3/4) with approx. 30min downtime  - Currently A&Ox0, unresponsive to pain, pupils fixed and dilated  - CT head (3/5): negative for bleed or lesions  - 24hr EEG (3/6): negative for seizures  - febrile Tmax 104F overnight (3/5) -> tylenol PRN q6hr, cooling blanket  - f/u neuron specific enolase  - plan for MRI brain w/o con 3/9, per neuro recs  - Neuro following  - Monitor mental status per protocol  - s/p precedex gtt 1.5 for agitation (off 3/4)  - s/p seroquel 25mg PO q6hr per psych recs, (off 3/5)    ==================== RESPIRATORY======================  - intubated (3/4) s/p cardiac arrest  - 500/6/22/40%  - CXR with R-sided moderate effusion (3/5)    ====================CARDIOVASCULAR==================  # Septic shock from pseudomonas bacteremia  - POCUS reveals no evidence of  fluid overload  - s/p fluid resuscitation    #PEA arrest (3/4)  - approx. 30min downtime  - Pressors: levo 0.4 (3/4- )  - lactate 7.4, now downtrending    # h/o heart transplant  - Nonischemic cardiomyopathy, chronic systolic heart failure s/p HM2 LVAD (6/2017), s/p heart transplant from Hep. C donor (treated) 2/23/18  - Intolerant of Cellcept due to leukopenia  - hold home tacro, per heart failure recs  - c/w home prednisone 15mg daily  - c/w Atovaquone for ppx  - c/w nystatin for ppx  - hold home ASA    # Paroxysmal atrial fibrillation.   - Home regimen : Eliquis 5 mg PO BID at home for hx of of afib and IJ thrombi  - EKG on admission - NSR  - hold lopressor 25 BID (3/4)  - s/p heparin gtt  - hold home ASA    # Hypertension (chronic)  - Holding home Losartan 75 mg PO QD iso shock    # Vascular disorder of lower extremity   - Extremities appear cool and dry   - c/w local wound care by podiatry     ===================HEMATOLOGIC/ONC ===================  # Hx of hemolytic anemia, Sanchez Syndrome  - Follows with Dr. Rosario as outpatient  - Prednisone 15 mg PO QD --> switched to Solu-cortef 75mg IV q8hr (2/26-3/3) > back to prednisone 15mg (3/3)  - f/u hematology/oncology recs  - Monitor H&H daily  - f/u hemolysis labs and peripheral smear    # DVT ppx: hold home ASA 81mg    ===================== RENAL =========================  # NORMAN on CKD  # ATN 2/2 septic shock  - Scr ranged from 1.3-2.1 in 2023. Most recent Scr was 1.75 on 1/30/24. On admission, Scr was 1.9 >>> 6  - No improvement with IVF, likely ATN i/s/o contrast/medications  - CRRT stopped 3/3 8pm with initial plan for intermittent HD  - trial bumex 4mg IV x1 (3/4) -> no output, bladder scan 3/5 with 180cc urine  - pt on levo s/p cardiac arrest 3/4, likely unable to tolerate HD/CRRT at this time  - sodium bicarb 1300mg TID  - Nephro following    # Hyperkalemia (resolved)   - K 6.2 at ED arrival  - Shifted in the ED, no EKG changes consistent with severe hyper K. Cr not significantly elevated form baseline, c/f RTA from bactrim and Tacro. Stable   - s/p Lokelma 10mg BID for 3 days  - dialysis as above    ==================== GASTROINTESTINAL===================  # Ileus, resolved  #+/- coffee ground emesis, resolved  - 1 episode of coffee ground emesis, unclear if true GIB  - CTAP (2/22) ileus vs partial SBO - cannot r/o GI bleed  - c/w PPI IV 40 BID  - feeds held 3/4 s/p cardiac arrest  - rising AST/ALT, likely shock liver i/s/o cardiac arrest    =======================    ENDOCRINE  =====================  # DMT2  - A1c 5.8  - ISS    ========================INFECTIOUS DISEASE================  # Septic shock   # Hypothermia  # bacteremia  - Presented with T 91.4F, WBC 21 concerning for possible occult infection,   - U/A without LE or Nitrites, CXR without clear consolidation, MRSA negative Lower concern for meningitis at this moment  - 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 No manifest signs of bowel ischemia. Interval worsening of right lower lobe and right middle lobe atelectasis secondary to elevated right hemidiaphragm when compared to prior CT dated 7/11/2023. Superimposed infection in the collapsed lungs is not excluded  - meropenem (2/21-2/24), restarted meropenem for CNS coverage (2/28 - )  - s/p vancomycin for CNS coverage (2/28 - 3/4)  - s/p zosyn (2/24 - 2/28)  - MRSA swab negative  - hold home Valganciclovir for ppx --> switch to ganciclovir ppx  - f/u infectious labs - Toxoplasma, EBV, Fungitell, Galactomannan, CMV, Cryptococcus, MRSA, ASCENCION virus, CMV, Crypto, ASCENCION virus  - f/u GI PCR, consider C. Diff testing   - Transplant ID recommendations    ====================ASSESSMENT ==============  75yo M w/ pmhx HTN, HLD, nonischemic cardiomyopathy, chronic systolic heart failure s/p HM2 LVAD (6/2017), s/p heart transplant from Hep. 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 Found to have septic shock, (2/19 BCx pseudo, 2/22 BCx NGTD). Still episodes of hypotension. Worsening NORMAN. Accepted to MICU for CRRT.    Plan:  ====================== NEUROLOGY=====================  # Metabolic encephalopathy  - Likely multi-factorial etiologies: septic vs uremic vs hypercapnic, less likely meningitis  - Patient baseline AOx3. AOx1 at ED arrival, iso of infection vs metabolic derangements  - CTH and angio in ED w/o hemorrhage or stenosis   - spot EEG 2/21: negative for seizures  - s/p PEA arrest (3/4) with approx. 30min downtime  - Currently A&Ox0, unresponsive to pain, pupils fixed and dilated  - CT head (3/5): negative for bleed or lesions  - 24hr EEG (3/6): negative for seizures  - febrile Tmax 104F overnight (3/5) -> tylenol PRN q6hr, cooling blanket  - f/u neuron specific enolase  - plan for MRI brain w/o con 3/9, per neuro recs  - Neuro following  - Monitor mental status per protocol  - s/p precedex gtt 1.5 for agitation (off 3/4)  - s/p seroquel 25mg PO q6hr per psych recs, (off 3/5)  - 3/8 Hypothermic overnight currently on warming blanket     ==================== RESPIRATORY======================  - intubated (3/4) s/p cardiac arrest  - 500/6/22/40%  - CXR with R-sided moderate effusion (3/5)    ====================CARDIOVASCULAR==================  # Septic shock from pseudomonas bacteremia  - POCUS reveals no evidence of  fluid overload  - s/p fluid resuscitation    #PEA arrest (3/4)  - approx. 30min downtime  - Pressors: levo 0.4 (3/4- )  - lactate 7.4, now downtrending lactate 2.3-2.5 remains stable     # h/o heart transplant  - Nonischemic cardiomyopathy, chronic systolic heart failure s/p HM2 LVAD (6/2017), s/p heart transplant from Hep. C donor (treated) 2/23/18  - Intolerant of Cellcept due to leukopenia  - hold home tacro, per heart failure recs  - c/w home prednisone 15mg daily  - c/w Atovaquone for ppx  - c/w nystatin for ppx  - hold home ASA    # Paroxysmal atrial fibrillation.   - Home regimen : Eliquis 5 mg PO BID at home for hx of of afib and IJ thrombi  - EKG on admission - NSR  - hold lopressor 25 BID (3/4)  - off heparin gtt  - hold home ASA    # Vascular disorder of lower extremity   - Extremities appear cool and dry   - c/w local wound care by podiatry     ===================HEMATOLOGIC/ONC ===================  # Hx of hemolytic anemia, Sanchez Syndrome  - Follows with Dr. Rosario as outpatient  - Prednisone 15 mg PO QD --> switched to Solu-cortef 75mg IV q8hr (2/26-3/3) > back to prednisone 15mg (3/3)  - f/u hematology/oncology recs  - Monitor H&H daily  - f/u hemolysis labs and peripheral smear    # DVT ppx: hold home ASA 81mg    ===================== RENAL =========================  # NORMAN on CKD  # ATN 2/2 septic shock  - Scr ranged from 1.3-2.1 in 2023. Most recent Scr was 1.75 on 1/30/24. On admission, Scr was 1.9 >>> 6  - No improvement with IVF, likely ATN i/s/o contrast/medications  - CRRT stopped 3/3 8pm with initial plan for intermittent HD  - trial bumex 4mg IV x1 (3/4) -> no output, bladder scan 3/5 with 180cc urine  - pt on levo s/p cardiac arrest 3/4, likely unable to tolerate HD/CRRT at this time  - sodium bicarb 1300mg TID  - Nephro following: No HD     # Hyperkalemia (resolved)   - K 6.2 at ED arrival  - Shifted in the ED, no EKG changes consistent with severe hyper K. Cr not significantly elevated form baseline, c/f RTA from bactrim and Tacro. Stable   - s/p Lokelma 10mg BID for 3 days  - dialysis as above    ==================== GASTROINTESTINAL===================  # Ileus, resolved  #+/- coffee ground emesis, resolved  - 1 episode of coffee ground emesis, unclear if true GIB  - CTAP (2/22) ileus vs partial SBO - cannot r/o GI bleed  - c/w PPI IV 40 BID  - feeds held 3/4 s/p cardiac arrest  - rising AST/ALT, likely shock liver i/s/o cardiac arrest    =======================    ENDOCRINE  =====================  # DMT2  - A1c 5.8  - ISS    ========================INFECTIOUS DISEASE================  # Septic shock   # Hypothermia  # bacteremia  - Presented with T 91.4F, WBC 21 concerning for possible occult infection,   - U/A without LE or Nitrites, CXR without clear consolidation, MRSA negative Lower concern for meningitis at this moment  - 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 No manifest signs of bowel ischemia. Interval worsening of right lower lobe and right middle lobe atelectasis secondary to elevated right hemidiaphragm when compared to prior CT dated 7/11/2023. Superimposed infection in the collapsed lungs is not excluded  - meropenem (2/21-2/24), restarted meropenem for CNS coverage (2/28 - )  - s/p vancomycin for CNS coverage (2/28 - 3/4)  - s/p zosyn (2/24 - 2/28)  - MRSA swab negative  - hold home Valganciclovir for ppx --> switch to ganciclovir ppx  - f/u infectious labs - Toxoplasma, EBV, Fungitell, Galactomannan, CMV, Cryptococcus, MRSA, ASCENCION virus, CMV, Crypto, ASCENCION virus  - f/u GI PCR, consider C. Diff testing   - Transplant ID recommendations     Code status: DNR  Currently on minimum levo    ====================ASSESSMENT ==============  75yo M w/ pmhx HTN, HLD, nonischemic cardiomyopathy, chronic systolic heart failure s/p HM2 LVAD (6/2017), s/p heart transplant from Hep. 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 Found to have septic shock, (2/19 BCx pseudo, 2/22 BCx NGTD). Still episodes of hypotension. Worsening NORMAN. Accepted to MICU for CRRT.    Plan:  ====================== NEUROLOGY=====================  # Metabolic encephalopathy  - Likely multi-factorial etiologies: septic vs uremic vs hypercapnic, less likely meningitis  - Patient baseline AOx3. AOx1 at ED arrival, iso of infection vs metabolic derangements  - CTH and angio in ED w/o hemorrhage or stenosis   - spot EEG 2/21: negative for seizures  - s/p PEA arrest (3/4) with approx. 30min downtime  - Currently A&Ox0, unresponsive to pain, pupils fixed and dilated  - CT head (3/5): negative for bleed or lesions  - 24hr EEG (3/6): negative for seizures  - febrile Tmax 104F overnight (3/5) -> tylenol PRN q6hr, cooling blanket  - f/u neuron specific enolase  - plan for MRI brain w/o con 3/9, per neuro recs  - Neuro following  - Monitor mental status per protocol  - s/p precedex gtt 1.5 for agitation (off 3/4)  - s/p seroquel 25mg PO q6hr per psych recs, (off 3/5)  - 3/8 Hypothermic overnight currently on warming blanket     ==================== RESPIRATORY======================  - intubated (3/4) s/p cardiac arrest  - 500/6/22/40%  - CXR with R-sided moderate effusion (3/5)    ====================CARDIOVASCULAR==================  # Septic shock from pseudomonas bacteremia  - POCUS reveals no evidence of  fluid overload  - s/p fluid resuscitation    #PEA arrest (3/4)  - approx. 30min downtime  - Pressors: levo 0.4 (3/4- )  - lactate 7.4, now downtrending lactate 2.3-2.5 remains stable     # h/o heart transplant  - Nonischemic cardiomyopathy, chronic systolic heart failure s/p HM2 LVAD (6/2017), s/p heart transplant from Hep. C donor (treated) 2/23/18  - Intolerant of Cellcept due to leukopenia  - hold home tacro, per heart failure recs  - c/w home prednisone 15mg daily  - c/w Atovaquone for ppx  - c/w nystatin for ppx  - hold home ASA    # Paroxysmal atrial fibrillation.   - Home regimen : Eliquis 5 mg PO BID at home for hx of of afib and IJ thrombi  - EKG on admission - NSR  - hold lopressor 25 BID (3/4)  - off heparin gtt  - hold home ASA    # Vascular disorder of lower extremity   - Extremities appear cool and dry   - c/w local wound care by podiatry     ===================HEMATOLOGIC/ONC ===================  # Hx of hemolytic anemia, Sanchez Syndrome  - Follows with Dr. Rosario as outpatient  - Prednisone 15 mg PO QD --> switched to Solu-cortef 75mg IV q8hr (2/26-3/3) > back to prednisone 15mg (3/3)  - f/u hematology/oncology recs  - Monitor H&H daily  - f/u hemolysis labs and peripheral smear    # DVT ppx: hold home ASA 81mg    ===================== RENAL =========================  # NORMAN on CKD  # ATN 2/2 septic shock  - Scr ranged from 1.3-2.1 in 2023. Most recent Scr was 1.75 on 1/30/24. On admission, Scr was 1.9 >>> 6  - No improvement with IVF, likely ATN i/s/o contrast/medications  - CRRT stopped 3/3 8pm with initial plan for intermittent HD  - trial bumex 4mg IV x1 (3/4) -> no output, bladder scan 3/5 with 180cc urine  - pt on levo s/p cardiac arrest 3/4, likely unable to tolerate HD/CRRT at this time  - sodium bicarb 1300mg TID  - Nephro following: No HD     # Hyperkalemia (resolved)   - K 6.2 at ED arrival  - Shifted in the ED, no EKG changes consistent with severe hyper K. Cr not significantly elevated form baseline, c/f RTA from bactrim and Tacro. Stable   - s/p Lokelma 10mg BID for 3 days  - dialysis as above    ==================== GASTROINTESTINAL===================  # Ileus, resolved  #+/- coffee ground emesis, resolved  - 1 episode of coffee ground emesis, unclear if true GIB  - CTAP (2/22) ileus vs partial SBO - cannot r/o GI bleed  - c/w PPI IV 40 BID  - feeds held 3/4 s/p cardiac arrest  - rising AST/ALT, likely shock liver i/s/o cardiac arrest    =======================    ENDOCRINE  =====================  # DMT2  - A1c 5.8  - ISS    ========================INFECTIOUS DISEASE================  # Septic shock   # Hypothermia  # bacteremia  - Presented with T 91.4F, WBC 21 concerning for possible occult infection,   - U/A without LE or Nitrites, CXR without clear consolidation, MRSA negative Lower concern for meningitis at this moment  - 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 No manifest signs of bowel ischemia. Interval worsening of right lower lobe and right middle lobe atelectasis secondary to elevated right hemidiaphragm when compared to prior CT dated 7/11/2023. Superimposed infection in the collapsed lungs is not excluded  - meropenem (2/21-2/24), restarted meropenem for CNS coverage (2/28 - )  - s/p vancomycin for CNS coverage (2/28 - 3/4)  - s/p zosyn (2/24 - 2/28)  - MRSA swab negative  - hold home Valganciclovir for ppx --> switch to ganciclovir ppx  - f/u infectious labs - Toxoplasma, EBV, Fungitell, Galactomannan, CMV, Cryptococcus, MRSA, ASCENCION virus, CMV, Crypto, ASCENCION virus  - f/u GI PCR, consider C. Diff testing   - Transplant ID recommendations     Code status: DNR  Currently on minimum levo   No further blood draw  GOC discussion   Palliative care consult

## 2025-03-08 NOTE — CHART NOTE - NSCHARTNOTEFT_GEN_A_CORE
- [facility] organ procurement in progress  - On CVVHD for clearance of electrolyte derangements for organ procurement, will try to pull net negative 50 and uptitrate to see hoe he handles  - Not initiating own breaths   - Brain death testing planned for tomorrow   - Labs q6h while organ procurement process ongoing   - Levo 0.06, SBP 120s  - Weeping edema, necrosis of fingers - [facility] organ procurement in progress  - On CVVHD and continuing antibiotics for clearance of electrolyte derangements for organ procurement as per their requirements, will try to pull net negative 50 and uptitrate to see hoe he handles  - Not initiating own breaths   - Brain death testing planned for tomorrow   - Labs q6h while organ procurement process ongoing   - Levo 0.06, SBP 120s  - Weeping edema, necrosis of fingers, pulling on CVVHD as a result - [facility] organ procurement in progress  - On CVVHD and continuing antibiotics for clearance of electrolyte derangements for organ procurement as per their requirements, will try to pull net negative 50 and uptitrate to see how he handles  - Not initiating own breaths   - Brain death examination planned for tomorrow   - Labs q6h while organ procurement process ongoing   - Levo 0.06, SBP 120s  - Weeping edema, pulling net neg on CVVHD as a result

## 2025-03-08 NOTE — PROGRESS NOTE ADULT - SUBJECTIVE AND OBJECTIVE BOX
Angie Son PA-C  Contact via Goby    CCU PROGRESS NOTE:    BILLY RUSSELL  MRN-[id number]  Patient is a 74y old  Male who presents with a chief complaint of Lethargy (08 Mar 2025 13:25)    INTERVAL HPI/OVERNIGHT EVENTS: Patient now made an organ donor. Will be restarting CVV. No longer DNR. Pending brain death testing.     SUBJECTIVE: Patient seen and examined at bedside. Unable to assess ROS 2/2 mental status.    MEDICATIONS  (STANDING):  albuterol/ipratropium for Nebulization 3 milliLiter(s) Nebulizer every 6 hours  artificial  tears Solution 1 Drop(s) Both EYES every 2 hours  atovaquone  Suspension 1500 milliGRAM(s) Oral daily  chlorhexidine 0.12% Liquid 15 milliLiter(s) Oral Mucosa every 12 hours  chlorhexidine 2% Cloths 1 Application(s) Topical daily  cholecalciferol 1000 Unit(s) Oral daily  CRRT Treatment    <Continuous>  ganciclovir IVPB 115 milliGRAM(s) IV Intermittent <User Schedule>  insulin lispro (ADMELOG) corrective regimen sliding scale   SubCutaneous every 6 hours  norepinephrine Infusion 0.7 MICROgram(s)/kG/Min (119 mL/Hr) IV Continuous <Continuous>  nystatin    Suspension 718343 Unit(s) Oral four times a day  pantoprazole  Injectable 40 milliGRAM(s) IV Push two times a day  Phoxillum Filtration BK 4 / 2.5 5000 milliLiter(s) (1000 mL/Hr) CRRT <Continuous>  Phoxillum Filtration BK 4 / 2.5 5000 milliLiter(s) (1200 mL/Hr) CRRT <Continuous>  predniSONE   Tablet 15 milliGRAM(s) Oral daily  PrismaSOL Filtration BGK 0 / 2.5 5000 milliLiter(s) (200 mL/Hr) CRRT <Continuous>  senna 2 Tablet(s) Oral daily  sodium bicarbonate 650 milliGRAM(s) Oral every 8 hours  vancomycin  IVPB        MEDICATIONS  (PRN):  acetylcysteine 10%  Inhalation 4 milliLiter(s) Inhalation every 4 hours PRN Thick Secretions    OBJECTIVE:  ICU Vital Signs Last 24 Hrs  T(C): 37.2 (08 Mar 2025 21:00), Max: 37.5 (08 Mar 2025 16:00)  T(F): 99 (08 Mar 2025 21:00), Max: 99.5 (08 Mar 2025 16:00)  HR: 135 (08 Mar 2025 21:00) (120 - 138)  ABP: 130/56 (08 Mar 2025 21:00) (87/37 - 150/63)  ABP(mean): 74 (08 Mar 2025 21:00) (49 - 87)  RR: 18 (08 Mar 2025 21:00) (18 - 22)  SpO2: 100% (08 Mar 2025 21:00) (91% - 100%)    O2 Parameters below as of 08 Mar 2025 21:00  Patient On (Oxygen Delivery Method): ventilator    O2 Concentration (%): 40    Mode: AC/ CMV (Assist Control/ Continuous Mandatory Ventilation), RR (machine): 18, TV (machine): 500, FiO2: 40, PEEP: 6, ITime: 1    I&O's Summary    07 Mar 2025 07:01  -  08 Mar 2025 07:00  --------------------------------------------------------  IN: 1127 mL / OUT: 0 mL / NET: 1127 mL    08 Mar 2025 06:01  -  08 Mar 2025 22:07  --------------------------------------------------------  IN: 886 mL / OUT: 0 mL / NET: 886 mL    CAPILLARY BLOOD GLUCOSE    GENERAL: NAD  NECK: no JVD  HEART: S1, S2, regular rhythm, tachy  LUNGS: Unlabored respirations.  Clear to auscultation bilaterally  ABDOMEN: Soft, nontender, nondistended, +BS  EXTREMITIES: 2+ peripheral pulses bilaterally. No clubbing, cyanosis, or edema  NEURO:  (-) face sens/str by corneals b/l, (-) spontaneous respirations (vent rate 12 bpm), (-) cough.  Intubated, not sedated, comatose, no speech output, does not follow commands.     ============================I/O===========================   I&O's Detail    07 Mar 2025 07:01  -  08 Mar 2025 07:00  --------------------------------------------------------  IN:    Norepinephrine: 1127 mL  Total IN: 1127 mL    OUT:  Total OUT: 0 mL    Total NET: 1127 mL    08 Mar 2025 06:01  -  08 Mar 2025 22:07  --------------------------------------------------------  IN:    Enteral Tube Flush: 175 mL    IV PiggyBack: 150 mL    Norepinephrine: 561 mL  Total IN: 886 mL    OUT:    Voided (mL): 0 mL  Total OUT: 0 mL    Total NET: 886 mL    ============================ LABS =========================                        8.4    36.58 )-----------( 255      ( 08 Mar 2025 01:11 )             26.2     03-08    141  |  107  |  84[H]  ----------------------------<  148[H]  4.7   |  15[L]  |  7.17[H]    Ca    6.3[LL]      08 Mar 2025 01:11  Phos  7.0     03-08  Mg     2.4     03-08    TPro  4.1[L]  /  Alb  1.8[L]  /  TBili  1.6[H]  /  DBili  x   /  AST  1091[H]  /  ALT  310[H]  /  AlkPhos  190[H]  03-08    LIVER FUNCTIONS - ( 08 Mar 2025 01:11 )  Alb: 1.8 g/dL / Pro: 4.1 g/dL / ALK PHOS: 190 U/L / ALT: 310 U/L / AST: 1091 U/L / GGT: x           ABG - ( 08 Mar 2025 00:59 )  pH, Arterial: 7.33  pH, Blood: x     /  pCO2: 31    /  pO2: 162   / HCO3: 16    / Base Excess: -8.7  /  SaO2: 100.0     Blood Gas Arterial, Lactate: 2.5 mmol/L (03-08-25 @ 00:59)  Blood Gas Arterial, Lactate: 2.3 mmol/L (03-07-25 @ 00:31)  Blood Gas Arterial, Lactate: 4.8 mmol/L (03-06-25 @ 00:41)    Urinalysis Basic - ( 08 Mar 2025 01:11 )    Color: x / Appearance: x / SG: x / pH: x  Gluc: 148 mg/dL / Ketone: x  / Bili: x / Urobili: x   Blood: x / Protein: x / Nitrite: x   Leuk Esterase: x / RBC: x / WBC x   Sq Epi: x / Non Sq Epi: x / Bacteria: x    ======================MICRO/RAD/CARDIO=================  Telemetry: Reviewed   EKG: Reviewed  CXR: Reviewed  Culture: Reviewed   Echo: Limited Transthoracic Echo:   Patient name: BILLY RUSSELL  YOB: 1950   Age: 72 (M)   MR#: [id number]  Study Date: 3/17/2023  Location: [address] [place], [place] [id number]: Greg Mckeon RDCS  Study quality: Technically fair  Referring Physician: Brittany Trevizo MD  Blood Pressure: 116/73 mmHg  Height: 170 cm  Weight: 73 kg  BSA: 1.8 m2  Heart Rate: 99 mmHg  ------------------------------------------------------------------------  PROCEDURE: Limited transthoracic echocardiogram with 2-D.  M-Mode and spectral and color flow Doppler.  INDICATION: Abnormal electrocardiogram (ECG) (EKG) (R94.31)  ------------------------------------------------------------------------  Dimensions:    Normal Values:  LA:            2.0 - 4.0 cm  Ao:            2.0 - 3.8 cm  SEPTUM: 1.1    0.6 - 1.2 cm  PWT:    1.1    0.6 - 1.1 cm  LVIDd:  3.7    3.0 - 5.6 cm  LVIDs:  2.5    1.8 - 4.0 cm  Derived variables:  LVMI: 70 g/m2  RWT: 0.59  Fractional short: 32 %  EF (Sherman Rule): 72 %  ------------------------------------------------------------------------  Observations:  Aortic Valve/Aorta:  Aortic Root: 3.5 cm.  Left Ventricle: Hyperdynamic left ventricular systolic  function. Normal left ventricular internal dimensions and  wall thicknesses.  Right Heart: Normal right atrium. Right ventricular  enlargement with normal right ventricular systolic  function. The RV measures 4.5cm at the base and 3.3cm mid  RV.  Pericardium/Pleura: Normal pericardium with no pericardial  effusion.  ------------------------------------------------------------------------  Conclusions:  1. Hyperdynamic left ventricular systolic function.  2. Right ventricular enlargement with normal right  ventricular systolic function. The RV measures 4.5cm at the  base and 3.3cm mid RV.  *** Compared with echocardiogram of3/3/2023, no  significant changes noted. There is improved RV  visualization in the current study.  ------------------------------------------------------------------------  Confirmed on  3/17/2023 - 16:29:36 by Shar Whitney M.D.  ------------------------------------------------------------------------ (03-17-23 @ 13:34)    Cath:

## 2025-03-08 NOTE — PROCEDURE NOTE - NSSITEPREP_SKIN_A_CORE
chlorhexidine
chlorhexidine
chlorhexidine/Adherence to aseptic technique: hand hygiene prior to donning barriers (gown, gloves), don cap and mask, sterile drape over patient
chlorhexidine
chlorhexidine
chlorhexidine/Adherence to aseptic technique: hand hygiene prior to donning barriers (gown, gloves), don cap and mask, sterile drape over patient
chlorhexidine

## 2025-03-08 NOTE — PROGRESS NOTE ADULT - ASSESSMENT
====================ASSESSMENT ==============  73yo M w/ pmhx HTN, HLD, nonischemic cardiomyopathy, chronic systolic heart failure s/p HM2 LVAD (6/2017), s/p heart transplant from Hep. 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 Found to have septic shock, (2/19 BCx pseudo, 2/22 BCx NGTD). Still episodes of hypotension. Worsening NORMAN. Accepted to MICU for CRRT.    Plan:  ====================== NEUROLOGY=====================  # Metabolic encephalopathy  - Likely multi-factorial etiologies: septic vs uremic vs hypercapnic, less likely meningitis  - Patient baseline AOx3. AOx1 at ED arrival, iso of infection vs metabolic derangements  - CTH and angio in ED w/o hemorrhage or stenosis   - spot EEG 2/21: negative for seizures  - s/p PEA arrest (3/4) with approx. 30min downtime  - Currently A&Ox0, unresponsive to pain, pupils fixed and dilated  - CT head (3/5): negative for bleed or lesions  - 24hr EEG (3/6): negative for seizures  - febrile Tmax 104F overnight (3/5) -> tylenol PRN q6hr, cooling blanket  - f/u neuron specific enolase  - plan for MRI brain w/o con 3/9, per neuro recs  - Neuro following  - Monitor mental status per protocol  - s/p precedex gtt 1.5 for agitation (off 3/4)  - s/p seroquel 25mg PO q6hr per psych recs, (off 3/5)  - 3/8 Hypothermic overnight currently on warming blanket     ==================== RESPIRATORY======================  - intubated (3/4) s/p cardiac arrest  - 500/6/22/40%  - CXR with R-sided moderate effusion (3/5)    ====================CARDIOVASCULAR==================  # Septic shock from pseudomonas bacteremia  - POCUS reveals no evidence of  fluid overload  - s/p fluid resuscitation    #PEA arrest (3/4)  - approx. 30min downtime  - Pressors: levo 0.4 (3/4- )  - lactate 7.4, now downtrending lactate 2.3-2.5 remains stable     # h/o heart transplant  - Nonischemic cardiomyopathy, chronic systolic heart failure s/p HM2 LVAD (6/2017), s/p heart transplant from Hep. C donor (treated) 2/23/18  - Intolerant of Cellcept due to leukopenia  - hold home tacro, per heart failure recs  - c/w home prednisone 15mg daily  - c/w Atovaquone for ppx  - c/w nystatin for ppx  - hold home ASA    # Paroxysmal atrial fibrillation.   - Home regimen : Eliquis 5 mg PO BID at home for hx of of afib and IJ thrombi  - EKG on admission - NSR  - hold lopressor 25 BID (3/4)  - off heparin gtt  - hold home ASA    # Vascular disorder of lower extremity   - Extremities appear cool and dry   - c/w local wound care by podiatry     ===================HEMATOLOGIC/ONC ===================  # Hx of hemolytic anemia, Sanchez Syndrome  - Follows with Dr. Rosario as outpatient  - Prednisone 15 mg PO QD --> switched to Solu-cortef 75mg IV q8hr (2/26-3/3) > back to prednisone 15mg (3/3)  - f/u hematology/oncology recs  - Monitor H&H daily  - f/u hemolysis labs and peripheral smear    # DVT ppx: hold home ASA 81mg    ===================== RENAL =========================  # NORMAN on CKD  # ATN 2/2 septic shock  - Scr ranged from 1.3-2.1 in 2023. Most recent Scr was 1.75 on 1/30/24. On admission, Scr was 1.9 >>> 6  - No improvement with IVF, likely ATN i/s/o contrast/medications  - CRRT stopped 3/3 8pm with initial plan for intermittent HD  - trial bumex 4mg IV x1 (3/4) -> no output, bladder scan 3/5 with 180cc urine  - pt on levo s/p cardiac arrest 3/4, likely unable to tolerate HD/CRRT at this time  - sodium bicarb 1300mg TID  - Nephro following: No HD     # Hyperkalemia (resolved)   - K 6.2 at ED arrival  - Shifted in the ED, no EKG changes consistent with severe hyper K. Cr not significantly elevated form baseline, c/f RTA from bactrim and Tacro. Stable   - s/p Lokelma 10mg BID for 3 days  - dialysis as above    ==================== GASTROINTESTINAL===================  # Ileus, resolved  #+/- coffee ground emesis, resolved  - 1 episode of coffee ground emesis, unclear if true GIB  - CTAP (2/22) ileus vs partial SBO - cannot r/o GI bleed  - c/w PPI IV 40 BID  - feeds held 3/4 s/p cardiac arrest  - rising AST/ALT, likely shock liver i/s/o cardiac arrest    =======================    ENDOCRINE  =====================  # DMT2  - A1c 5.8  - ISS    ========================INFECTIOUS DISEASE================  # Septic shock   # Hypothermia  # bacteremia  - Presented with T 91.4F, WBC 21 concerning for possible occult infection,   - U/A without LE or Nitrites, CXR without clear consolidation, MRSA negative Lower concern for meningitis at this moment  - 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 No manifest signs of bowel ischemia. Interval worsening of right lower lobe and right middle lobe atelectasis secondary to elevated right hemidiaphragm when compared to prior CT dated 7/11/2023. Superimposed infection in the collapsed lungs is not excluded  - meropenem (2/21-2/24), restarted meropenem for CNS coverage (2/28 - )  - s/p vancomycin for CNS coverage (2/28 - 3/4)  - s/p zosyn (2/24 - 2/28)  - MRSA swab negative  - hold home Valganciclovir for ppx --> switch to ganciclovir ppx  - f/u infectious labs - Toxoplasma, EBV, Fungitell, Galactomannan, CMV, Cryptococcus, MRSA, ASCENCION virus, CMV, Crypto, ASCENCION virus  - f/u GI PCR, consider C. Diff testing   - Transplant ID recommendations     Code status: DNR  Currently on minimum levo   No further blood draw  GOC discussion   Palliative care consult

## 2025-03-08 NOTE — CHART NOTE - NSCHARTNOTEFT_GEN_A_CORE
Patient is no longer going to have further lab draws and antibiotics are being discontinued.  Goals of care are transitioning towards comfort care only.    Infectious disease will sign off at this time.  Please reconsult as needed    Gianluca Loving M.D.  [facility] Division of Infectious Disease  8AM-5PM Monday - Friday: Available on [employer] Teams  After Hours and Holidays (or if no response on Microsoft Teams): Please contact the Infectious Diseases Office at [phone]     The above assessment and plan were discussed with CICU Team

## 2025-03-09 NOTE — PROGRESS NOTE ADULT - ASSESSMENT
NORMAN- ATN   Required CRRT 2/23-3/3 iso worsening oliguric renal failure w/ hemodynamic instability.   CRRT stopped on 3/3 in preparation to transition to iHD. However pt w/ PEA arrest overnight 3/4, now intubated and w/ high IV vasopressor requirements     3/8- Resumed CRRT for organ donation purposes. can keep in negative fluid balance     jyoti abraham  nephrology attending   please contact me on TEAMS   Office- [phone]

## 2025-03-09 NOTE — PROCEDURE NOTE - NSINFORMCONSENT_GEN_A_CORE
This was an emergent procedure.
This was an emergent procedure.
Benefits, risks, and possible complications of procedure explained to patient/caregiver who verbalized understanding and gave written consent.
Benefits, risks, and possible complications of procedure explained to patient/caregiver who verbalized understanding and gave verbal consent.
Benefits, risks, and possible complications of procedure explained to patient/caregiver who verbalized understanding and gave written consent.
This was an emergent procedure.
This was an emergent procedure.
Benefits, risks, and possible complications of procedure explained to patient/caregiver who verbalized understanding and gave written consent.

## 2025-03-09 NOTE — CONSULT NOTE ADULT - ASSESSMENT
Interventional Radiology    Evaluate for Procedure: Liver tx for potential organ donation    HPI: 74M w brain death for which urgent liver bx was requested for potential organ donation/ live on.    Allergies: No Known Allergies    Medications (Abx/Cardiac/Anticoagulation/Blood Products)    atovaquone  Suspension: 1500 milliGRAM(s) Oral (03-09 @ 12:22)  ganciclovir IVPB: 100 mL/Hr IV Intermittent (03-07 @ 20:35)  meropenem  IVPB: 100 mL/Hr IV Intermittent (03-08 @ 20:44)  norepinephrine Infusion: 119 mL/Hr IV Continuous (03-08 @ 20:44)  nystatin    Suspension: [id number] Unit(s) Oral (03-09 @ 17:35)  piperacillin/tazobactam IVPB..: 200 mL/Hr IV Intermittent (03-09 @ 13:50)  vancomycin  IVPB: 250 mL/Hr IV Intermittent (03-09 @ 00:57)  vancomycin  IVPB: 250 mL/Hr IV Intermittent (03-09 @ 17:35)    Data:    T(C): 37  HR: 126  RR: 14  SpO2: 100%    -WBC 25.26 / HgB 8.8 / Hct 26.7 / Plt 131  -Na 143 / Cl 105 / BUN 76 / Glucose 242  -K 5.5 / CO2 19 / Cr 4.82  - / Alk Phos 283 / T.Bili 2.6  -INR 1.93 / PTT 33.9      Radiology: reviewed    Assessment/Plan:   74M w brain death for which urgent liver bx was requested for potential organ donation/ live on.  -- IR will plan to perform asap  -- FFP being given as pt w INR elevated   -- please place IR procedure order under Dr. Ramsay

## 2025-03-09 NOTE — CONSULT NOTE ADULT - PROBLEM SELECTOR RECOMMENDATION 9
- s/p left EAC cerumen removal, right EAC clear, b/l TMs visualized and intact.  - No contraindication to evaluate for oculovestibular reflex.  - No further ENT intervention required at this time, please call back if needed.

## 2025-03-09 NOTE — PROGRESS NOTE ADULT - SUBJECTIVE AND OBJECTIVE BOX
ICU Attending:    Death by Neurologic Criteria Exam:    Coma, irreversible secondary to anoxic injury from cardiac arrest: Confirmed  On no sedation for multiple days: Confirmed.  Blood pressure 157/92  Absence of conditions or medications which may confound examination: Confirmed  Core temperature 36.8 C  Absence of motor response to pain in limbs: Confirmed  Pupils nonreactive to bright light: Confirmed  Absent oculocephalic reflex: Confirmed  No deviation of eyes to cold caloric testing: Confirmed  Absent corneal reflex: Confirmed  No gag reflex: Confirmed  No cough response to tracheobronchial suctioning: Confirmed    Apnea test performed:  Initial AB.4/40/306/22    Patient removed from vent support - placed on 6 liters/min O2 via ET tube    ABG at 8 minutes: 7.17/68/398/25    Date and Time of death: 3/09/2025 at 17:00    First attending: Alda Gonzales MD  Second attending: Eliane Vergara MD

## 2025-03-09 NOTE — PROGRESS NOTE ADULT - SUBJECTIVE AND OBJECTIVE BOX
Brain Death Exam Note     No conditions or medications that may confound examination  Core temperature: 36.8C  Blood pressure: 157/92  No supraspinal motor response to pain  Pupils nonreactive to bright light   Absent oculocephalic reflex  No deviation of eyes to cold caloric testing   Absent corneal reflex  No cough reflex or response to stimulation of the posterior pharynx or tracheobronchial suctioning   No gag reflex     Apnea test performed:   Initial ABG 7.40/40/306/22  ABG at 8 minutes: 7.17/68/398/25    Time of death: 3/9/25 at 1700    First attending: Alda Gonzales MD  Second attending: MD Eliane Laird M.D.  Department of Trauma, Acute Care Surgery and Surgical Critical Care

## 2025-03-09 NOTE — PHARMACOTHERAPY INTERVENTION NOTE - COMMENTS
Reason for Consult: Pharmacist Request    Recommended to dose empiric vancomycin 750 mg (7.5 mg/kg) q12h for CRRT patient. Keep vancomycin levels >15.      Na Peña PharmD   Clinical Pharmacy Specialist, Infectious Diseases  Tele-Antimicrobial Stewardship Program (Tele-ASP)  Tele-ASP Phone: [phone]   Reason for Consult: Pharmacist Request    Recommended to dose empiric vancomycin 750 mg (7.5 mg/kg) q12h for CRRT patient. Check trough in 24 hours and keep vancomycin levels >15.      Na Peña PharmD   Clinical Pharmacy Specialist, Infectious Diseases  Tele-Antimicrobial Stewardship Program (Tele-ASP)  Tele-ASP Phone: [phone]

## 2025-03-09 NOTE — PROGRESS NOTE ADULT - CRITICAL CARE ATTENDING COMMENT
75yo HTN, HLD, NICM, HM2, OHT, Nicole syndrome, AF, CKD, pseudomonas bacteremia, NORMAN, PEA arrest no stimuli, awaiting organ donation and terminal extubation. CVVHD to improve BUN, given synthroid, now on levo and vaso, was hypothermic the hyperthermic, 500/22/6/50%. +1.1 liters. wBC 34, H/H stable BUN 72 this AM, working with [facility] now.

## 2025-03-09 NOTE — PROGRESS NOTE ADULT - SUBJECTIVE AND OBJECTIVE BOX
[facility] Division of Kidney Diseases & Hypertension  FOLLOW UP NOTE  --------------------------------------------------------------------------------  Chief Complaint:    24 hour events/subjective:    restarted on crrt for organ donation         PAST HISTORY  --------------------------------------------------------------------------------  No significant changes to PMH, PSH, FHx, SHx, unless otherwise noted    ALLERGIES & MEDICATIONS  --------------------------------------------------------------------------------  Allergies    No Known Allergies    Intolerances      Standing Inpatient Medications  albuterol/ipratropium for Nebulization 3 milliLiter(s) Nebulizer every 6 hours  artificial  tears Solution 1 Drop(s) Both EYES every 2 hours  atovaquone  Suspension 1500 milliGRAM(s) Oral daily  chlorhexidine 0.12% Liquid 15 milliLiter(s) Oral Mucosa every 12 hours  chlorhexidine 2% Cloths 1 Application(s) Topical daily  cholecalciferol 1000 Unit(s) Oral daily  CRRT Treatment    <Continuous>  ganciclovir IVPB 115 milliGRAM(s) IV Intermittent <User Schedule>  heparin  Infusion Syringe 300 Unit(s)/Hr CRRT <Continuous>  insulin lispro (ADMELOG) corrective regimen sliding scale   SubCutaneous every 6 hours  levothyroxine Infusion 10 MICROgram(s)/Hr IV Continuous <Continuous>  norepinephrine Infusion 0.7 MICROgram(s)/kG/Min IV Continuous <Continuous>  nystatin    Suspension 024277 Unit(s) Oral four times a day  pantoprazole  Injectable 40 milliGRAM(s) IV Push two times a day  piperacillin/tazobactam IVPB.. 2.25 Gram(s) IV Intermittent every 8 hours  predniSONE   Tablet 15 milliGRAM(s) Oral daily  PrismaSATE Dialysate BK 0 / 3.5 5000 milliLiter(s) CRRT <Continuous>  PrismaSOL Filtration BGK 4 / 2.5 5000 milliLiter(s) CRRT <Continuous>  PrismaSOL Filtration BGK 4 / 2.5 5000 milliLiter(s) CRRT <Continuous>  senna 2 Tablet(s) Oral daily  sodium bicarbonate 650 milliGRAM(s) Oral every 8 hours  vancomycin  IVPB 750 milliGRAM(s) IV Intermittent every 12 hours  vancomycin  IVPB      vasopressin Infusion 0.1 Unit(s)/Min IV Continuous <Continuous>    PRN Inpatient Medications  acetylcysteine 10%  Inhalation 4 milliLiter(s) Inhalation every 4 hours PRN      VITALS/PHYSICAL EXAM  --------------------------------------------------------------------------------  T(C): 36.9 (03-09-25 @ 09:00), Max: 37.5 (03-08-25 @ 16:00)  HR: 135 (03-09-25 @ 09:15) (135 - 144)  BP: --  RR: 12 (03-09-25 @ 09:15) (12 - 22)  SpO2: 96% (03-09-25 @ 03:45) (91% - 100%)  Wt(kg): --    Weight (kg): 90.7 (03-07-25 @ 21:45)      03-08-25 @ 06:01  -  03-09-25 @ 07:00  --------------------------------------------------------  IN: 1880.8 mL / OUT: 742 mL / NET: 1138.8 mL    03-09-25 @ 07:01  -  03-09-25 @ 09:20  --------------------------------------------------------  IN: 195.6 mL / OUT: 190 mL / NET: 5.6 mL      Physical Exam:  	Gen: NAD  	Pulm: CTA B/L  	CV: RRR, S1S2; no rub  	LE: Warm, 3+ edema   	Vascular access: temporary dialysis catheter     LABS/STUDIES  --------------------------------------------------------------------------------              8.8    34.16 >-----------<  205      [03-09-25 @ 05:26]              27.6     138  |  104  |  72  ----------------------------<  172      [03-09-25 @ 05:26]  4.9   |  14  |  5.13        Ca     6.8     [03-09-25 @ 05:26]      Mg     2.4     [03-09-25 @ 05:26]      Phos  6.0     [03-09-25 @ 05:26]    TPro  4.4  /  Alb  2.2  /  TBili  2.3  /  DBili  1.5  /  AST  986  /  ALT  181  /  AlkPhos  340  [03-09-25 @ 05:26]    PT/INR: PT 16.7 , INR 1.47       [03-09-25 @ 05:26]  PTT: 28.4       [03-09-25 @ 05:26]    LDH 2772      [03-09-25 @ 05:26]    Creatinine Trend:  SCr 5.13 [03-09 @ 05:26]  SCr 7.32 [03-08 @ 22:36]  SCr 7.17 [03-08 @ 01:11]  SCr 6.24 [03-07 @ 00:42]  SCr 5.11 [03-06 @ 00:44]    Urinalysis - [03-09-25 @ 05:26]      Color  / Appearance  / SG  / pH       Gluc 172 / Ketone   / Bili  / Urobili        Blood  / Protein  / Leuk Est  / Nitrite       RBC  / WBC  / Hyaline  / Gran  / Sq Epi  / Non Sq Epi  / Bacteria         HBsAb <3.0      [03-05-25 @ 06:51]  HBsAg Nonreact      [03-05-25 @ 06:51]  HBcAb Nonreact      [03-05-25 @ 06:51]

## 2025-03-09 NOTE — PROCEDURE NOTE - PROCEDURE DATE TIME, MLM
03-Mar-2025 16:10
08-Mar-2025 06:00
09-Mar-2025 20:06
23-Feb-2025 20:48
25-Feb-2025 23:03
04-Mar-2025 22:02
02-Mar-2025 23:15
03-Mar-2025 15:20
24-Feb-2025 00:10

## 2025-03-09 NOTE — PROGRESS NOTE ADULT - ASSESSMENT
====================ASSESSMENT ==============  75yo M w/ pmhx HTN, HLD, nonischemic cardiomyopathy, chronic systolic heart failure s/p HM2 LVAD (6/2017), s/p heart transplant from Hep. 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 Found to have septic shock, (2/19 BCx pseudo, 2/22 BCx NGTD). Still episodes of hypotension. Worsening NORMAN. Accepted to MICU for CRRT.    Plan:  ====================== NEUROLOGY=====================  # Metabolic encephalopathy  - Likely multi-factorial etiologies: septic vs uremic vs hypercapnic, less likely meningitis  - Patient baseline AOx3. AOx1 at ED arrival, iso of infection vs metabolic derangements  - CTH and angio in ED w/o hemorrhage or stenosis   - spot EEG 2/21: negative for seizures  - s/p PEA arrest (3/4) with approx. 30min downtime  - Currently A&Ox0, unresponsive to pain, pupils fixed and dilated  - CT head (3/5): negative for bleed or lesions  - 24hr EEG (3/6): negative for seizures  - febrile Tmax 104F overnight (3/5) -> tylenol PRN q6hr, cooling blanket  - f/u neuron specific enolase  - plan for MRI brain w/o con 3/9, per neuro recs  - Neuro following  - Monitor mental status per protocol  - s/p precedex gtt 1.5 for agitation (off 3/4)  - s/p seroquel 25mg PO q6hr per psych recs, (off 3/5)  - 3/8 Hypothermic overnight currently on warming blanket     ==================== RESPIRATORY======================  - intubated (3/4) s/p cardiac arrest  - 500/6/18/40%  - CXR with R-sided moderate effusion (3/5)  - MV setting adjustment for target GIGI 35-45 PH 7.45    ====================CARDIOVASCULAR==================  # Septic shock from pseudomonas bacteremia  - POCUS reveals no evidence of  fluid overload  - s/p fluid resuscitation    #PEA arrest (3/4)  - approx. 30min downtime  - Pressors: levo 0.4 (3/4- )  - lactate 7.4, now downtrending lactate 2.3-2.5 remains stable     # h/o heart transplant  - Nonischemic cardiomyopathy, chronic systolic heart failure s/p HM2 LVAD (6/2017), s/p heart transplant from Hep. C donor (treated) 2/23/18  - Intolerant of Cellcept due to leukopenia  - hold home tacro, per heart failure recs  - c/w home prednisone 15mg daily  - c/w Atovaquone for ppx  - c/w nystatin for ppx  - hold home ASA    # Paroxysmal atrial fibrillation.   - Home regimen : Eliquis 5 mg PO BID at home for hx of of afib and IJ thrombi  - EKG on admission - NSR  - hold lopressor 25 BID (3/4)  - off heparin gtt  - hold home ASA    # Vascular disorder of lower extremity   - Extremities appear cool and dry   - c/w local wound care by podiatry     ===================HEMATOLOGIC/ONC ===================  # Hx of hemolytic anemia, Nicole Syndrome  - Follows with Dr. Rosario as outpatient  - Prednisone 15 mg PO QD --> switched to Solu-cortef 75mg IV q8hr (2/26-3/3) > back to prednisone 15mg (3/3)  - f/u hematology/oncology recs  - Monitor H&H daily  - f/u hemolysis labs and peripheral smear    # DVT ppx: hold home ASA 81mg    ===================== RENAL =========================  # NORMAN on CKD  # ATN 2/2 septic shock  - Scr ranged from 1.3-2.1 in 2023. Most recent Scr was 1.75 on 1/30/24. On admission, Scr was 1.9 >>> 6  - No improvement with IVF, likely ATN i/s/o contrast/medications  - CRRT stopped 3/3 8pm with initial plan for intermittent HD  - trial bumex 4mg IV x1 (3/4) -> no output, bladder scan 3/5 with 180cc urine  - pt on levo s/p cardiac arrest 3/4, likely unable to tolerate HD/CRRT at this time  - sodium bicarb 1300mg TID  - Nephro following: No HD     # Hyperkalemia (resolved)   - K 6.2 at ED arrival  - Shifted in the ED, no EKG changes consistent with severe hyper K. Cr not significantly elevated form baseline, c/f RTA from bactrim and Tacro. Stable   - s/p Lokelma 10mg BID for 3 days  - dialysis as above    ==================== GASTROINTESTINAL===================  # Ileus, resolved  #+/- coffee ground emesis, resolved  - 1 episode of coffee ground emesis, unclear if true GIB  - CTAP (2/22) ileus vs partial SBO - cannot r/o GI bleed  - c/w PPI IV 40 BID  - feeds held 3/4 s/p cardiac arrest  - rising AST/ALT, likely shock liver i/s/o cardiac arrest    =======================    ENDOCRINE  =====================  # DMT2  - A1c 5.8  - ISS    ========================INFECTIOUS DISEASE================  # Septic shock   # Hypothermia  # bacteremia  - Presented with T 91.4F, WBC 21 concerning for possible occult infection,   - U/A without LE or Nitrites, CXR without clear consolidation, MRSA negative Lower concern for meningitis at this moment  - 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 No manifest signs of bowel ischemia. Interval worsening of right lower lobe and right middle lobe atelectasis secondary to elevated right hemidiaphragm when compared to prior CT dated 7/11/2023. Superimposed infection in the collapsed lungs is not excluded  - meropenem (2/21-2/24), restarted meropenem for CNS coverage (2/28 - )  - s/p vancomycin for CNS coverage (2/28 - 3/4)  - s/p zosyn (2/24 - 2/28)  - MRSA swab negative  - hold home Valganciclovir for ppx --> switch to ganciclovir ppx  - f/u infectious labs - Toxoplasma, EBV, Fungitell, Galactomannan, CMV, Cryptococcus, MRSA, ASCENCION virus, CMV, Crypto, ASCENCION virus  - f/u GI PCR, consider C. Diff testing   - Transplant ID recommendations     Code status: LiveOn case   currently on Levo and Vaso   CRRT     Addendum:(5:42pm): The patient's death is not reportable to ME office   Discussed with Dr. Negron   Health care proxy was contacted and informed ( Marion Quinteor)

## 2025-03-09 NOTE — PROGRESS NOTE ADULT - SUBJECTIVE AND OBJECTIVE BOX
CICU DAY NOTE  Admission date: 2/20/25  Chief complaint/ Diagnosis: Septic shock   HPI: 73yo M pmhx HTN, HLD, nonischemic cardiomyopathy, chronic systolic heart failure s/p HM2 LVAD (6/2017), s/p heart transplant from Hep. 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 On the phone, the patient's godbrother mentioned that on 2/18, the patient was in the car with his godbrother and was disoriented asking to get off on the road 4 blocks before his house. In addition, a caretaker stated that when she spoke to Mr. Hameed on the phone at 11am on 2/18, he was doing well. However, when the caretaker visited 2 hours later at 1pm, the patient was disoriented and felt his legs were heavy. This prompted the patient to come to the hospital.   In the ED: Hypothermic 91.4, HR 80, /60, RA   Patient noted to be Hyperkalemic (6.2) with Mixed respiratory and metabolic acidosis pH 7.2. - Patient was given Calcium Gluconate 2g, D50/Insulin 5 unit, 40 mg IV lasix, Lokelma 10mg.   Vancomycin and Zosyn  (20 Feb 2025 07:21)    Interval history:     MEDICATIONS  (STANDING):  albuterol/ipratropium for Nebulization 3 milliLiter(s) Nebulizer every 6 hours  artificial  tears Solution 1 Drop(s) Both EYES every 2 hours  atovaquone  Suspension 1500 milliGRAM(s) Oral daily  cholecalciferol 1000 Unit(s) Oral daily  CRRT Treatment    <Continuous>  ganciclovir IVPB 115 milliGRAM(s) IV Intermittent <User Schedule>  insulin lispro (ADMELOG) corrective regimen sliding scale   SubCutaneous every 6 hours  levothyroxine Infusion 10 MICROgram(s)/Hr (25 mL/Hr) IV Continuous <Continuous>  norepinephrine Infusion 0.7 MICROgram(s)/kG/Min (119 mL/Hr) IV Continuous <Continuous>  nystatin    Suspension 598636 Unit(s) Oral four times a day  pantoprazole  Injectable 40 milliGRAM(s) IV Push two times a day  Phoxillum Filtration BK 4 / 2.5 5000 milliLiter(s) (1200 mL/Hr) CRRT <Continuous>  Phoxillum Filtration BK 4 / 2.5 5000 milliLiter(s) (1000 mL/Hr) CRRT <Continuous>  piperacillin/tazobactam IVPB.. 2.25 Gram(s) IV Intermittent every 8 hours  predniSONE   Tablet 15 milliGRAM(s) Oral daily  PrismaSOL Filtration BGK 0 / 2.5 5000 milliLiter(s) (200 mL/Hr) CRRT <Continuous>  senna 2 Tablet(s) Oral daily  sodium bicarbonate 650 milliGRAM(s) Oral every 8 hours  vancomycin  IVPB 750 milliGRAM(s) IV Intermittent every 12 hours  vancomycin  IVPB      vasopressin Infusion 0.1 Unit(s)/Min (15 mL/Hr) IV Continuous <Continuous>    MEDICATIONS  (PRN):  acetylcysteine 10%  Inhalation 4 milliLiter(s) Inhalation every 4 hours PRN Thick Secretions    Allergy   No Known Allergies    ICU Vital Signs Last 24 Hrs  T(C): 36.8 (09 Mar 2025 04:00), Max: 37.5 (08 Mar 2025 16:00)  T(F): 98.2 (09 Mar 2025 04:00), Max: 99.5 (08 Mar 2025 16:00)  HR: 141 (09 Mar 2025 06:15) (126 - 144)  ABP: 119/51 (09 Mar 2025 06:15) (87/37 - 146/62)  ABP(mean): 67 (09 Mar 2025 06:15) (49 - 83)  RR: 18 (09 Mar 2025 06:15) (18 - 22)  SpO2: 96% (09 Mar 2025 03:45) (91% - 100%) on MV 40%     Mode: AC/ CMV (Assist Control/ Continuous Mandatory Ventilation)  RR (machine): 18 TV (machine): 500 FiO2: 40 PEEP: 6    I&O's Summary  IN: 1776.3 mL / OUT: 667 mL / NET: 1109.3 mL    PHYSICAL EXAM  Appearance: Normal, NAD  HEAD:  Atraumatic, Normocephalic  EYES: EOMI, PERRLA, conjunctiva and sclera clear  NECK: Supple, No JVD  CHEST/LUNG: Clear to auscultation bilaterally; No wheezing  HEART: Normal S1, S2. No murmurs, rubs, or gallops  ABDOMEN: + Bowel sounds, Soft, NT, ND   EXTREMITIES:  2+ Peripheral Pulses, No clubbing, cyanosis, or edema  NEUROLOGY:   SKIN: No rashes or lesions    Interpretation of Telemetry:                        8.8    34.16 )-----------( 205                  27.6     138  |  104  |  72[H]  ---------------------------<  172[H]  4.9   |  14[L]  |  5.13[H]    Ca    6.8[L]    Phos  6.0     Mg     2.4       TPro  4.4[L]  /  Alb  2.2[L]  /  TBili  2.3[H]  /  DBili  1.5[H]  /  AST  986[H]  /  ALT  181[H]  /  AlkPhos  340[H]   ABG - ( 09 Mar 2025 05:05 )  pH, Arterial: 7.35 /  pCO2: 29    /  pO2: 171   / HCO3: 16    / Base Excess: -8.6  /  SaO2: 99.4    F/S 154-163       CICU DAY NOTE  Admission date: 2/20/25  Chief complaint/ Diagnosis: Septic shock   HPI: 75yo M pmhx HTN, HLD, nonischemic cardiomyopathy, chronic systolic heart failure s/p HM2 LVAD (6/2017), s/p heart transplant from Hep. 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 On the phone, the patient's godbrother mentioned that on 2/18, the patient was in the car with his godbrother and was disoriented asking to get off on the road 4 blocks before his house. In addition, a caretaker stated that when she spoke to Mr. Hameed on the phone at 11am on 2/18, he was doing well. However, when the caretaker visited 2 hours later at 1pm, the patient was disoriented and felt his legs were heavy. This prompted the patient to come to the hospital.   In the ED: Hypothermic 91.4, HR 80, /60, RA   Patient noted to be Hyperkalemic (6.2) with Mixed respiratory and metabolic acidosis pH 7.2. - Patient was given Calcium Gluconate 2g, D50/Insulin 5 unit, 40 mg IV lasix, Lokelma 10mg.   Vancomycin and Zosyn  (20 Feb 2025 07:21)    Interval history: family initially wished the terminal extubation but accepted for organ donation  LiveOn case  currently on levo and vaso   CRRT started for elevated BUN (target<75) for brain death criteria evaluation     MEDICATIONS  (STANDING):  albuterol/ipratropium for Nebulization 3 milliLiter(s) Nebulizer every 6 hours  artificial  tears Solution 1 Drop(s) Both EYES every 2 hours  atovaquone  Suspension 1500 milliGRAM(s) Oral daily  cholecalciferol 1000 Unit(s) Oral daily  CRRT Treatment    <Continuous>  ganciclovir IVPB 115 milliGRAM(s) IV Intermittent <User Schedule>  insulin lispro (ADMELOG) corrective regimen sliding scale   SubCutaneous every 6 hours  levothyroxine Infusion 10 MICROgram(s)/Hr (25 mL/Hr) IV Continuous <Continuous>  norepinephrine Infusion 0.7 MICROgram(s)/kG/Min (119 mL/Hr) IV Continuous <Continuous>  nystatin    Suspension 331912 Unit(s) Oral four times a day  pantoprazole  Injectable 40 milliGRAM(s) IV Push two times a day  Phoxillum Filtration BK 4 / 2.5 5000 milliLiter(s) (1200 mL/Hr) CRRT <Continuous>  Phoxillum Filtration BK 4 / 2.5 5000 milliLiter(s) (1000 mL/Hr) CRRT <Continuous>  piperacillin/tazobactam IVPB.. 2.25 Gram(s) IV Intermittent every 8 hours  predniSONE   Tablet 15 milliGRAM(s) Oral daily  PrismaSOL Filtration BGK 0 / 2.5 5000 milliLiter(s) (200 mL/Hr) CRRT <Continuous>  senna 2 Tablet(s) Oral daily  sodium bicarbonate 650 milliGRAM(s) Oral every 8 hours  vancomycin  IVPB 750 milliGRAM(s) IV Intermittent every 12 hours  vancomycin  IVPB      vasopressin Infusion 0.1 Unit(s)/Min (15 mL/Hr) IV Continuous <Continuous>    MEDICATIONS  (PRN):  acetylcysteine 10%  Inhalation 4 milliLiter(s) Inhalation every 4 hours PRN Thick Secretions    Allergy   No Known Allergies    ICU Vital Signs Last 24 Hrs  T(C): 36.8 (09 Mar 2025 04:00), Max: 37.5 (08 Mar 2025 16:00)  T(F): 98.2 (09 Mar 2025 04:00), Max: 99.5 (08 Mar 2025 16:00)  HR: 141 (09 Mar 2025 06:15) (126 - 144)  ABP: 119/51 (09 Mar 2025 06:15) (87/37 - 146/62)  ABP(mean): 67 (09 Mar 2025 06:15) (49 - 83)  RR: 18 (09 Mar 2025 06:15) (18 - 22)  SpO2: 96% (09 Mar 2025 03:45) (91% - 100%) on MV 40%     Mode: AC/ CMV (Assist Control/ Continuous Mandatory Ventilation)  RR (machine): 18 TV (machine): 500 FiO2: 40 PEEP: 6    I&O's Summary  IN: 1776.3 mL / OUT: 667 mL / NET: 1109.3 mL    PHYSICAL EXAM  Appearance: Normal, NAD  HEAD:  Atraumatic, Normocephalic  EYES: EOMI, PERRLA, conjunctiva and sclera clear  NECK: Supple, No JVD  CHEST/LUNG: Clear to auscultation bilaterally; No wheezing  HEART: Normal S1, S2. No murmurs, rubs, or gallops  ABDOMEN: + Bowel sounds, Soft, NT, ND   EXTREMITIES:  2+ Peripheral Pulses, No clubbing, cyanosis, or edema  NEUROLOGY:   SKIN: No rashes or lesions    Interpretation of Telemetry:                        8.8    34.16 )-----------( 205                  27.6     138  |  104  |  72[H]  ---------------------------<  172[H]  4.9   |  14[L]  |  5.13[H]    Ca    6.8[L]    Phos  6.0     Mg     2.4       TPro  4.4[L]  /  Alb  2.2[L]  /  TBili  2.3[H]  /  DBili  1.5[H]  /  AST  986[H]  /  ALT  181[H]  /  AlkPhos  340[H]   ABG - ( 09 Mar 2025 05:05 )  pH, Arterial: 7.35 /  pCO2: 29    /  pO2: 171   / HCO3: 16    / Base Excess: -8.6  /  SaO2: 99.4    F/S 154-163       CICU DAY NOTE  Admission date: 2/20/25  Chief complaint/ Diagnosis: Septic shock   HPI: 73yo M pmhx HTN, HLD, nonischemic cardiomyopathy, chronic systolic heart failure s/p HM2 LVAD (6/2017), s/p heart transplant from Hep. 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 On the phone, the patient's godbrother mentioned that on 2/18, the patient was in the car with his godbrother and was disoriented asking to get off on the road 4 blocks before his house. In addition, a caretaker stated that when she spoke to Mr. Hameed on the phone at 11am on 2/18, he was doing well. However, when the caretaker visited 2 hours later at 1pm, the patient was disoriented and felt his legs were heavy. This prompted the patient to come to the hospital.   In the ED: Hypothermic 91.4, HR 80, /60, RA   Patient noted to be Hyperkalemic (6.2) with Mixed respiratory and metabolic acidosis pH 7.2. - Patient was given Calcium Gluconate 2g, D50/Insulin 5 unit, 40 mg IV lasix, Lokelma 10mg.   Vancomycin and Zosyn  (20 Feb 2025 07:21)    Interval history: family initially wished the terminal extubation but accepted for organ donation  LiveOn case  currently on levo and vaso   CRRT started for elevated BUN (target<75) for brain death criteria evaluation     MEDICATIONS  (STANDING):  albuterol/ipratropium for Nebulization 3 milliLiter(s) Nebulizer every 6 hours  artificial  tears Solution 1 Drop(s) Both EYES every 2 hours  atovaquone  Suspension 1500 milliGRAM(s) Oral daily  cholecalciferol 1000 Unit(s) Oral daily  CRRT Treatment    <Continuous>  ganciclovir IVPB 115 milliGRAM(s) IV Intermittent <User Schedule>  insulin lispro (ADMELOG) corrective regimen sliding scale   SubCutaneous every 6 hours  levothyroxine Infusion 10 MICROgram(s)/Hr (25 mL/Hr) IV Continuous <Continuous>  norepinephrine Infusion 0.7 MICROgram(s)/kG/Min (119 mL/Hr) IV Continuous <Continuous>  nystatin    Suspension 542594 Unit(s) Oral four times a day  pantoprazole  Injectable 40 milliGRAM(s) IV Push two times a day  Phoxillum Filtration BK 4 / 2.5 5000 milliLiter(s) (1200 mL/Hr) CRRT <Continuous>  Phoxillum Filtration BK 4 / 2.5 5000 milliLiter(s) (1000 mL/Hr) CRRT <Continuous>  piperacillin/tazobactam IVPB.. 2.25 Gram(s) IV Intermittent every 8 hours  predniSONE   Tablet 15 milliGRAM(s) Oral daily  PrismaSOL Filtration BGK 0 / 2.5 5000 milliLiter(s) (200 mL/Hr) CRRT <Continuous>  senna 2 Tablet(s) Oral daily  sodium bicarbonate 650 milliGRAM(s) Oral every 8 hours  vancomycin  IVPB 750 milliGRAM(s) IV Intermittent every 12 hours  vancomycin  IVPB      vasopressin Infusion 0.1 Unit(s)/Min (15 mL/Hr) IV Continuous <Continuous>    MEDICATIONS  (PRN):  acetylcysteine 10%  Inhalation 4 milliLiter(s) Inhalation every 4 hours PRN Thick Secretions    Allergy   No Known Allergies    ICU Vital Signs Last 24 Hrs  T(C): 36.8 (09 Mar 2025 04:00), Max: 37.5 (08 Mar 2025 16:00)  T(F): 98.2 (09 Mar 2025 04:00), Max: 99.5 (08 Mar 2025 16:00)  HR: 141 (09 Mar 2025 06:15) (126 - 144)  ABP: 119/51 (09 Mar 2025 06:15) (87/37 - 146/62)  ABP(mean): 67 (09 Mar 2025 06:15) (49 - 83)  RR: 18 (09 Mar 2025 06:15) (18 - 22)  SpO2: 96% (09 Mar 2025 03:45) (91% - 100%) on MV 40%     Mode: AC/ CMV (Assist Control/ Continuous Mandatory Ventilation)  RR (machine): 18 TV (machine): 500 FiO2: 40 PEEP: 6    I&O's Summary  IN: 1776.3 mL / OUT: 667 mL / NET: 1109.3 mL    PHYSICAL EXAM  Appearance: Normal, NAD  HEAD:   Normocephalic  EYES: fixed dilated pupils conjunctiva and sclera clear  NECK: Supple  CHEST/LUNG: Clear to auscultation bilaterally; No wheezing  HEART: Normal S1, S2. No murmurs, rubs, or gallops  ABDOMEN: + hypoactive Bowel sounds, Soft, NT, ND   EXTREMITIES:  1+ Peripheral Pulses, + edema bilateral UE/L  NEUROLOGY: No responses by obnoxious stimuli  SKIN: oozing bilateral UE     Interpretation of Telemetry:                        8.8    34.16 )-----------( 205                  27.6     138  |  104  |  72[H]  ---------------------------<  172[H]  4.9   |  14[L]  |  5.13[H]    Ca    6.8[L]    Phos  6.0     Mg     2.4       TPro  4.4[L]  /  Alb  2.2[L]  /  TBili  2.3[H]  /  DBili  1.5[H]  /  AST  986[H]  /  ALT  181[H]  /  AlkPhos  340[H]   ABG - ( 09 Mar 2025 05:05 )  pH, Arterial: 7.35 /  pCO2: 29    /  pO2: 171   / HCO3: 16    / Base Excess: -8.6  /  SaO2: 99.4    F/S 154-163

## 2025-03-09 NOTE — CONSULT NOTE ADULT - CONSULT REQUESTED DATE/TIME
22-Feb-2025 15:50
09-Mar-2025
09-Mar-2025 18:51
21-Feb-2025 14:22
23-Feb-2025 16:32
20-Feb-2025 13:40
20-Feb-2025 15:50
19-Feb-2025 23:40
21-Feb-2025 12:51
20-Feb-2025 13:02

## 2025-03-09 NOTE — CONSULT NOTE ADULT - ASSESSMENT
75 y/o Male with PMHx of HTN, HLD, nonischemic cardiomyopathy, chronic systolic heart failure s/p HM2 LVAD (6/2017), s/p heart transplant from Hep. 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 History obtained through chart review. On the phone, the patient's godbrother mentioned that on 2/18, the patient was in the car with his godbrother and was disoriented asking to get off on the road 4 blocks before his house. In addition, a caretaker stated that when she spoke to Mr. Hameed on the phone at 11am on 2/18, he was doing well. However, when the caretaker visited 2 hours later at 1pm, the patient was disoriented and felt his legs were heavy. This prompted the patient to come to the hospital. ENT now consulted to r/o EAC obstruction for testing of oculovestibular reflex. On exam, left EAC with cerumen that was removed, right EAC clear, b/l TMs visualized and intact.

## 2025-03-09 NOTE — PROCEDURE NOTE - NSPROCNAME_GEN_A_CORE
Central Line Insertion
Gastric Intubation/Gastric Lavage
Arterial Puncture/Cannulation
Interventional Radiology
Midline Insertion
Central Line Insertion
Midline Insertion
Midline Insertion
Central Line Insertion

## 2025-03-09 NOTE — PROGRESS NOTE ADULT - SUBJECTIVE AND OBJECTIVE BOX
Angie Son PA-C  Contact via Tarisa    CCU PROGRESS NOTE:    BILLY RUSSELL  MRN-[id number]  Patient is a 74y old  Male who presents with a chief complaint of Lethargy (09 Mar 2025 18:51)    INTERVAL HPI/OVERNIGHT EVENTS: Patient now s/p liver biopsy. Pending potential OR for organ donation.     SUBJECTIVE: Patient seen and examined at bedside. Unable to assess ROS as patient is brain dead.     MEDICATIONS  (STANDING):  albuterol/ipratropium for Nebulization 3 milliLiter(s) Nebulizer every 6 hours  artificial  tears Solution 1 Drop(s) Both EYES every 2 hours  atovaquone  Suspension 1500 milliGRAM(s) Oral daily  chlorhexidine 0.12% Liquid 15 milliLiter(s) Oral Mucosa every 12 hours  chlorhexidine 2% Cloths 1 Application(s) Topical daily  cholecalciferol 1000 Unit(s) Oral daily  CRRT Treatment    <Continuous>  ganciclovir IVPB 115 milliGRAM(s) IV Intermittent <User Schedule>  heparin  Infusion Syringe 300 Unit(s)/Hr (0.6 mL/Hr) CRRT <Continuous>  insulin lispro (ADMELOG) corrective regimen sliding scale   SubCutaneous every 6 hours  levothyroxine Infusion 10 MICROgram(s)/Hr (25 mL/Hr) IV Continuous <Continuous>  norepinephrine Infusion 0.7 MICROgram(s)/kG/Min (119 mL/Hr) IV Continuous <Continuous>  nystatin    Suspension 488922 Unit(s) Oral four times a day  pantoprazole  Injectable 40 milliGRAM(s) IV Push two times a day  piperacillin/tazobactam IVPB.. 2.25 Gram(s) IV Intermittent every 8 hours  predniSONE   Tablet 15 milliGRAM(s) Oral daily  PrismaSATE Dialysate BK 0 / 3.5 5000 milliLiter(s) (1000 mL/Hr) CRRT <Continuous>  PrismaSOL Filtration BGK 0 / 2.5 5000 milliLiter(s) (600 mL/Hr) CRRT <Continuous>  PrismaSOL Filtration BGK 4 / 2.5 5000 milliLiter(s) (200 mL/Hr) CRRT <Continuous>  senna 2 Tablet(s) Oral daily  sodium bicarbonate 650 milliGRAM(s) Oral every 8 hours  sodium bicarbonate  Infusion 0.124 mEq/kG/Hr (75 mL/Hr) IV Continuous <Continuous>  vancomycin  IVPB 750 milliGRAM(s) IV Intermittent every 12 hours  vancomycin  IVPB      vasopressin Infusion 0.1 Unit(s)/Min (15 mL/Hr) IV Continuous <Continuous>    MEDICATIONS  (PRN):  acetylcysteine 10%  Inhalation 4 milliLiter(s) Inhalation every 4 hours PRN Thick Secretions    OBJECTIVE:  ICU Vital Signs Last 24 Hrs  T(C): 37.1 (09 Mar 2025 19:00), Max: 37.1 (09 Mar 2025 19:00)  T(F): 98.8 (09 Mar 2025 19:00), Max: 98.8 (09 Mar 2025 19:00)  HR: 128 (09 Mar 2025 21:00) (109 - 144)  ABP: 150/60 (09 Mar 2025 21:00) (84/61 - 179/66)  ABP(mean): 84 (09 Mar 2025 21:00) (46 - 100)  RR: 14 (09 Mar 2025 21:00) (12 - 20)  SpO2: 100% (09 Mar 2025 21:00) (79% - 100%)    O2 Parameters below as of 09 Mar 2025 21:00  Patient On (Oxygen Delivery Method): ventilator    O2 Concentration (%): 40    Mode: AC/ CMV (Assist Control/ Continuous Mandatory Ventilation), RR (machine): 14, TV (machine): 500, FiO2: 40, PEEP: 6, ITime: 1    I&O's Summary    08 Mar 2025 06:01  -  09 Mar 2025 07:00  --------------------------------------------------------  IN: 1880.8 mL / OUT: 742 mL / NET: 1138.8 mL    09 Mar 2025 07:01  -  09 Mar 2025 21:48  --------------------------------------------------------  IN: 2594.4 mL / OUT: 190 mL / NET: 2404.4 mL    CAPILLARY BLOOD GLUCOSE    PHYSICAL EXAM  Appearance: Normal, NAD  HEAD:   Normocephalic  EYES: fixed dilated pupils conjunctiva and sclera clear  NECK: Supple  CHEST/LUNG: Clear to auscultation bilaterally; No wheezing  HEART: Normal S1, S2. No murmurs, rubs, or gallops  ABDOMEN: + hypoactive Bowel sounds, Soft, NT, ND   EXTREMITIES:  1+ Peripheral Pulses, + edema bilateral UE/L  NEUROLOGY: No responses by obnoxious stimuli  SKIN: oozing bilateral UE     ============================I/O===========================   I&O's Detail    08 Mar 2025 06:01  -  09 Mar 2025 07:00  --------------------------------------------------------  IN:    Enteral Tube Flush: 225 mL    IV PiggyBack: 500 mL    Levothyroxine (Organ Donation): 50 mL    Norepinephrine: 1024.8 mL    Vasopressin: 81 mL  Total IN: 1880.8 mL    OUT:    Other (mL): 742 mL    Voided (mL): 0 mL  Total OUT: 742 mL    Total NET: 1138.8 mL    09 Mar 2025 07:01  -  09 Mar 2025 21:48  --------------------------------------------------------  IN:    Enteral Tube Flush: 160 mL    IV PiggyBack: 250 mL    IV PiggyBack: 100 mL    Levothyroxine (Organ Donation): 300 mL    Norepinephrine: 479.4 mL    Plasma: 300 mL    PRBCs (Packed Red Blood Cells): 300 mL    Sodium Bicarbonate: 525 mL    Vasopressin: 180 mL  Total IN: 2594.4 mL    OUT:    Other (mL): 190 mL  Total OUT: 190 mL    Total NET: 2404.4 mL    ============================ LABS =========================                        8.8    25.26 )-----------( 131      ( 09 Mar 2025 17:28 )             26.7     03-09    143  |  105  |  76[H]  ----------------------------<  242[H]  5.5[H]   |  19[L]  |  4.82[H]    Ca    5.8[LL]      09 Mar 2025 17:28  Phos  8.3     03-09  Mg     2.2     03-09    TPro  3.4[L]  /  Alb  1.6[L]  /  TBili  2.6[H]  /  DBili  1.9[H]  /  AST  660[H]  /  ALT  118[H]  /  AlkPhos  283[H]  03-09    LIVER FUNCTIONS - ( 09 Mar 2025 17:28 )  Alb: 1.6 g/dL / Pro: 3.4 g/dL / ALK PHOS: 283 U/L / ALT: 118 U/L / AST: 660 U/L / GGT: x           PT/INR - ( 09 Mar 2025 20:50 )   PT: 16.7 sec;   INR: 1.46 ratio      PTT - ( 09 Mar 2025 20:50 )  PTT:29.9 sec  ABG - ( 09 Mar 2025 17:22 )  pH, Arterial: 7.29  pH, Blood: x     /  pCO2: 46    /  pO2: 306   / HCO3: 22    / Base Excess: -4.4  /  SaO2: 99.5      Blood Gas Arterial, Lactate: 4.5 mmol/L (03-09-25 @ 17:22)  Blood Gas Arterial, Lactate: 4.1 mmol/L (03-09-25 @ 17:02)  Blood Gas Arterial, Lactate: 4.0 mmol/L (03-09-25 @ 17:00)  Blood Gas Arterial, Lactate: 4.7 mmol/L (03-09-25 @ 16:45)  Blood Gas Arterial, Lactate: 5.2 mmol/L (03-09-25 @ 16:23)  Blood Gas Arterial, Lactate: 7.4 mmol/L (03-09-25 @ 14:36)  Blood Gas Arterial, Lactate: 8.1 mmol/L (03-09-25 @ 11:51)  Blood Gas Arterial, Lactate: 7.6 mmol/L (03-09-25 @ 10:52)  Blood Gas Arterial, Lactate: 6.5 mmol/L (03-09-25 @ 09:40)  Blood Gas Arterial, Lactate: 3.2 mmol/L (03-09-25 @ 05:05)  Blood Gas Arterial, Lactate: 2.1 mmol/L (03-08-25 @ 22:22)  Blood Gas Arterial, Lactate: 2.5 mmol/L (03-08-25 @ 00:59)  Blood Gas Arterial, Lactate: 2.3 mmol/L (03-07-25 @ 00:31)    Urinalysis Basic - ( 09 Mar 2025 17:28 )    Color: x / Appearance: x / SG: x / pH: x  Gluc: 242 mg/dL / Ketone: x  / Bili: x / Urobili: x   Blood: x / Protein: x / Nitrite: x   Leuk Esterase: x / RBC: x / WBC x   Sq Epi: x / Non Sq Epi: x / Bacteria: x    ======================MICRO/RAD/CARDIO=================  Telemetry: Reviewed   EKG: Reviewed  CXR: Reviewed  Culture: Reviewed   Echo: Limited Transthoracic Echo:   Patient name: BILLY RUSSELL  YOB: 1950   Age: 72 (M)   MR#: [id number]  Study Date: 3/17/2023  Location: [address] [place], [place] [id number]: Greg Mckeon RDCS  Study quality: Technically fair  Referring Physician: Brittany Trevizo MD  Blood Pressure: 116/73 mmHg  Height: 170 cm  Weight: 73 kg  BSA: 1.8 m2  Heart Rate: 99 mmHg  ------------------------------------------------------------------------  PROCEDURE: Limited transthoracic echocardiogram with 2-D.  M-Mode and spectral and color flow Doppler.  INDICATION: Abnormal electrocardiogram (ECG) (EKG) (R94.31)  ------------------------------------------------------------------------  Dimensions:    Normal Values:  LA:            2.0 - 4.0 cm  Ao:            2.0 - 3.8 cm  SEPTUM: 1.1    0.6 - 1.2 cm  PWT:    1.1    0.6 - 1.1 cm  LVIDd:  3.7    3.0 - 5.6 cm  LVIDs:  2.5    1.8 - 4.0 cm  Derived variables:  LVMI: 70 g/m2  RWT: 0.59  Fractional short: 32 %  EF (Sherman Rule): 72 %  ------------------------------------------------------------------------  Observations:  Aortic Valve/Aorta:  Aortic Root: 3.5 cm.  Left Ventricle: Hyperdynamic left ventricular systolic  function. Normal left ventricular internal dimensions and  wall thicknesses.  Right Heart: Normal right atrium. Right ventricular  enlargement with normal right ventricular systolic  function. The RV measures 4.5cm at the base and 3.3cm mid  RV.  Pericardium/Pleura: Normal pericardium with no pericardial  effusion.  ------------------------------------------------------------------------  Conclusions:  1. Hyperdynamic left ventricular systolic function.  2. Right ventricular enlargement with normal right  ventricular systolic function. The RV measures 4.5cm at the  base and 3.3cm mid RV.  *** Compared with echocardiogram of3/3/2023, no  significant changes noted. There is improved RV  visualization in the current study.  ------------------------------------------------------------------------  Confirmed on  3/17/2023 - 16:29:36 by Shar Whitney M.D.  ------------------------------------------------------------------------ (03-17-23 @ 13:34)    Cath:

## 2025-03-09 NOTE — PROGRESS NOTE ADULT - ASSESSMENT
====================ASSESSMENT ==============  73yo M w/ pmhx HTN, HLD, nonischemic cardiomyopathy, chronic systolic heart failure s/p HM2 LVAD (6/2017), s/p heart transplant from Hep. 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 Found to have septic shock, (2/19 BCx pseudo, 2/22 BCx NGTD). Still episodes of hypotension. Worsening NORMAN. Accepted to MICU for CRRT.    Plan:  ====================== NEUROLOGY=====================  # Metabolic encephalopathy  - Likely multi-factorial etiologies: septic vs uremic vs hypercapnic, less likely meningitis  - Patient baseline AOx3. AOx1 at ED arrival, iso of infection vs metabolic derangements  - CTH and angio in ED w/o hemorrhage or stenosis   - spot EEG 2/21: negative for seizures  - s/p PEA arrest (3/4) with approx. 30min downtime  - Currently A&Ox0, unresponsive to pain, pupils fixed and dilated  - CT head (3/5): negative for bleed or lesions  - 24hr EEG (3/6): negative for seizures  - febrile Tmax 104F overnight (3/5) -> tylenol PRN q6hr, cooling blanket  - f/u neuron specific enolase  - plan for MRI brain w/o con 3/9, per neuro recs  - Neuro following  - Monitor mental status per protocol  - s/p precedex gtt 1.5 for agitation (off 3/4)  - s/p seroquel 25mg PO q6hr per psych recs, (off 3/5)  - 3/8 Hypothermic overnight currently on warming blanket     ==================== RESPIRATORY======================  - intubated (3/4) s/p cardiac arrest  - 500/6/22/40%  - CXR with R-sided moderate effusion (3/5)    ====================CARDIOVASCULAR==================  # Septic shock from pseudomonas bacteremia  - POCUS reveals no evidence of  fluid overload  - s/p fluid resuscitation    #PEA arrest (3/4)  - approx. 30min downtime  - Pressors: levo 0.4 (3/4- )  - lactate 7.4, now downtrending lactate 2.3-2.5 remains stable     # h/o heart transplant  - Nonischemic cardiomyopathy, chronic systolic heart failure s/p HM2 LVAD (6/2017), s/p heart transplant from Hep. C donor (treated) 2/23/18  - Intolerant of Cellcept due to leukopenia  - hold home tacro, per heart failure recs  - c/w home prednisone 15mg daily  - c/w Atovaquone for ppx  - c/w nystatin for ppx  - hold home ASA    # Paroxysmal atrial fibrillation.   - Home regimen : Eliquis 5 mg PO BID at home for hx of of afib and IJ thrombi  - EKG on admission - NSR  - hold lopressor 25 BID (3/4)  - off heparin gtt  - hold home ASA    # Vascular disorder of lower extremity   - Extremities appear cool and dry   - c/w local wound care by podiatry     ===================HEMATOLOGIC/ONC ===================  # Hx of hemolytic anemia, Sanchez Syndrome  - Follows with Dr. Rosario as outpatient  - Prednisone 15 mg PO QD --> switched to Solu-cortef 75mg IV q8hr (2/26-3/3) > back to prednisone 15mg (3/3)  - f/u hematology/oncology recs  - Monitor H&H daily  - f/u hemolysis labs and peripheral smear    # DVT ppx: hold home ASA 81mg    ===================== RENAL =========================  # NORMAN on CKD  # ATN 2/2 septic shock  - Scr ranged from 1.3-2.1 in 2023. Most recent Scr was 1.75 on 1/30/24. On admission, Scr was 1.9 >>> 6  - No improvement with IVF, likely ATN i/s/o contrast/medications  - CRRT stopped 3/3 8pm with initial plan for intermittent HD  - trial bumex 4mg IV x1 (3/4) -> no output, bladder scan 3/5 with 180cc urine  - pt on levo s/p cardiac arrest 3/4, likely unable to tolerate HD/CRRT at this time  - sodium bicarb 1300mg TID  - Nephro following: No HD     # Hyperkalemia (resolved)   - K 6.2 at ED arrival  - Shifted in the ED, no EKG changes consistent with severe hyper K. Cr not significantly elevated form baseline, c/f RTA from bactrim and Tacro. Stable   - s/p Lokelma 10mg BID for 3 days  - dialysis as above    ==================== GASTROINTESTINAL===================  # Ileus, resolved  #+/- coffee ground emesis, resolved  - 1 episode of coffee ground emesis, unclear if true GIB  - CTAP (2/22) ileus vs partial SBO - cannot r/o GI bleed  - c/w PPI IV 40 BID  - feeds held 3/4 s/p cardiac arrest  - rising AST/ALT, likely shock liver i/s/o cardiac arrest    =======================    ENDOCRINE  =====================  # DMT2  - A1c 5.8  - ISS    ========================INFECTIOUS DISEASE================  # Septic shock   # Hypothermia  # bacteremia  - Presented with T 91.4F, WBC 21 concerning for possible occult infection,   - U/A without LE or Nitrites, CXR without clear consolidation, MRSA negative Lower concern for meningitis at this moment  - 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 No manifest signs of bowel ischemia. Interval worsening of right lower lobe and right middle lobe atelectasis secondary to elevated right hemidiaphragm when compared to prior CT dated 7/11/2023. Superimposed infection in the collapsed lungs is not excluded  - meropenem (2/21-2/24), restarted meropenem for CNS coverage (2/28 - )  - s/p vancomycin for CNS coverage (2/28 - 3/4)  - s/p zosyn (2/24 - 2/28)  - MRSA swab negative  - hold home Valganciclovir for ppx --> switch to ganciclovir ppx  - f/u infectious labs - Toxoplasma, EBV, Fungitell, Galactomannan, CMV, Cryptococcus, MRSA, ASCENCION virus, CMV, Crypto, ASCENCION virus  - f/u GI PCR, consider C. Diff testing   - Transplant ID recommendations     Code status: DNR  Currently on minimum levo   No further blood draw  GOC discussion   Palliative care consult    ====================ASSESSMENT ==============  73yo M w/ pmhx HTN, HLD, nonischemic cardiomyopathy, chronic systolic heart failure s/p HM2 LVAD (6/2017), s/p heart transplant from Hep. 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 Found to have septic shock, (2/19 BCx pseudo, 2/22 BCx NGTD). Still episodes of hypotension. Worsening NORMAN. Accepted to MICU for CRRT.    Plan:  ====================== NEUROLOGY=====================  # Metabolic encephalopathy  - Likely multi-factorial etiologies: septic vs uremic vs hypercapnic, less likely meningitis  - Patient baseline AOx3. AOx1 at ED arrival, iso of infection vs metabolic derangements  - CTH and angio in ED w/o hemorrhage or stenosis   - spot EEG 2/21: negative for seizures  - s/p PEA arrest (3/4) with approx. 30min downtime  - Currently A&Ox0, unresponsive to pain, pupils fixed and dilated  - CT head (3/5): negative for bleed or lesions  - 24hr EEG (3/6): negative for seizures  - febrile Tmax 104F overnight (3/5) -> tylenol PRN q6hr, cooling blanket  - f/u neuron specific enolase  - plan for MRI brain w/o con 3/9, per neuro recs  - Neuro following  - Monitor mental status per protocol  - s/p precedex gtt 1.5 for agitation (off 3/4)  - s/p seroquel 25mg PO q6hr per psych recs, (off 3/5)  - 3/8 Hypothermic overnight currently on warming blanket     ==================== RESPIRATORY======================  - intubated (3/4) s/p cardiac arrest  - 500/6/18/40%  - CXR with R-sided moderate effusion (3/5)  - MV setting adjustment for target GIGI 35-45 PH 7.45    ====================CARDIOVASCULAR==================  # Septic shock from pseudomonas bacteremia  - POCUS reveals no evidence of  fluid overload  - s/p fluid resuscitation    #PEA arrest (3/4)  - approx. 30min downtime  - Pressors: levo 0.4 (3/4- )  - lactate 7.4, now downtrending lactate 2.3-2.5 remains stable     # h/o heart transplant  - Nonischemic cardiomyopathy, chronic systolic heart failure s/p HM2 LVAD (6/2017), s/p heart transplant from Hep. C donor (treated) 2/23/18  - Intolerant of Cellcept due to leukopenia  - hold home tacro, per heart failure recs  - c/w home prednisone 15mg daily  - c/w Atovaquone for ppx  - c/w nystatin for ppx  - hold home ASA    # Paroxysmal atrial fibrillation.   - Home regimen : Eliquis 5 mg PO BID at home for hx of of afib and IJ thrombi  - EKG on admission - NSR  - hold lopressor 25 BID (3/4)  - off heparin gtt  - hold home ASA    # Vascular disorder of lower extremity   - Extremities appear cool and dry   - c/w local wound care by podiatry     ===================HEMATOLOGIC/ONC ===================  # Hx of hemolytic anemia, Sanchez Syndrome  - Follows with Dr. Rosario as outpatient  - Prednisone 15 mg PO QD --> switched to Solu-cortef 75mg IV q8hr (2/26-3/3) > back to prednisone 15mg (3/3)  - f/u hematology/oncology recs  - Monitor H&H daily  - f/u hemolysis labs and peripheral smear    # DVT ppx: hold home ASA 81mg    ===================== RENAL =========================  # NORMAN on CKD  # ATN 2/2 septic shock  - Scr ranged from 1.3-2.1 in 2023. Most recent Scr was 1.75 on 1/30/24. On admission, Scr was 1.9 >>> 6  - No improvement with IVF, likely ATN i/s/o contrast/medications  - CRRT stopped 3/3 8pm with initial plan for intermittent HD  - trial bumex 4mg IV x1 (3/4) -> no output, bladder scan 3/5 with 180cc urine  - pt on levo s/p cardiac arrest 3/4, likely unable to tolerate HD/CRRT at this time  - sodium bicarb 1300mg TID  - Nephro following: No HD     # Hyperkalemia (resolved)   - K 6.2 at ED arrival  - Shifted in the ED, no EKG changes consistent with severe hyper K. Cr not significantly elevated form baseline, c/f RTA from bactrim and Tacro. Stable   - s/p Lokelma 10mg BID for 3 days  - dialysis as above    ==================== GASTROINTESTINAL===================  # Ileus, resolved  #+/- coffee ground emesis, resolved  - 1 episode of coffee ground emesis, unclear if true GIB  - CTAP (2/22) ileus vs partial SBO - cannot r/o GI bleed  - c/w PPI IV 40 BID  - feeds held 3/4 s/p cardiac arrest  - rising AST/ALT, likely shock liver i/s/o cardiac arrest    =======================    ENDOCRINE  =====================  # DMT2  - A1c 5.8  - ISS    ========================INFECTIOUS DISEASE================  # Septic shock   # Hypothermia  # bacteremia  - Presented with T 91.4F, WBC 21 concerning for possible occult infection,   - U/A without LE or Nitrites, CXR without clear consolidation, MRSA negative Lower concern for meningitis at this moment  - 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 No manifest signs of bowel ischemia. Interval worsening of right lower lobe and right middle lobe atelectasis secondary to elevated right hemidiaphragm when compared to prior CT dated 7/11/2023. Superimposed infection in the collapsed lungs is not excluded  - meropenem (2/21-2/24), restarted meropenem for CNS coverage (2/28 - )  - s/p vancomycin for CNS coverage (2/28 - 3/4)  - s/p zosyn (2/24 - 2/28)  - MRSA swab negative  - hold home Valganciclovir for ppx --> switch to ganciclovir ppx  - f/u infectious labs - Toxoplasma, EBV, Fungitell, Galactomannan, CMV, Cryptococcus, MRSA, ASCENCION virus, CMV, Crypto, ASCENCION virus  - f/u GI PCR, consider C. Diff testing   - Transplant ID recommendations     Code status: LiveOn case   currently on Levo and Vaso   CRRT   [facility]ig brain death eval    ====================ASSESSMENT ==============  73yo M w/ pmhx HTN, HLD, nonischemic cardiomyopathy, chronic systolic heart failure s/p HM2 LVAD (6/2017), s/p heart transplant from Hep. 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 Found to have septic shock, (2/19 BCx pseudo, 2/22 BCx NGTD). Still episodes of hypotension. Worsening NORMAN. Accepted to MICU for CRRT.    Plan:  ====================== NEUROLOGY=====================  # Metabolic encephalopathy  - Likely multi-factorial etiologies: septic vs uremic vs hypercapnic, less likely meningitis  - Patient baseline AOx3. AOx1 at ED arrival, iso of infection vs metabolic derangements  - CTH and angio in ED w/o hemorrhage or stenosis   - spot EEG 2/21: negative for seizures  - s/p PEA arrest (3/4) with approx. 30min downtime  - Currently A&Ox0, unresponsive to pain, pupils fixed and dilated  - CT head (3/5): negative for bleed or lesions  - 24hr EEG (3/6): negative for seizures  - febrile Tmax 104F overnight (3/5) -> tylenol PRN q6hr, cooling blanket  - f/u neuron specific enolase  - plan for MRI brain w/o con 3/9, per neuro recs  - Neuro following  - Monitor mental status per protocol  - s/p precedex gtt 1.5 for agitation (off 3/4)  - s/p seroquel 25mg PO q6hr per psych recs, (off 3/5)  - 3/8 Hypothermic overnight currently on warming blanket     ==================== RESPIRATORY======================  - intubated (3/4) s/p cardiac arrest  - 500/6/18/40%  - CXR with R-sided moderate effusion (3/5)  - MV setting adjustment for target GIGI 35-45 PH 7.45    ====================CARDIOVASCULAR==================  # Septic shock from pseudomonas bacteremia  - POCUS reveals no evidence of  fluid overload  - s/p fluid resuscitation    #PEA arrest (3/4)  - approx. 30min downtime  - Pressors: levo 0.4 (3/4- )  - lactate 7.4, now downtrending lactate 2.3-2.5 remains stable     # h/o heart transplant  - Nonischemic cardiomyopathy, chronic systolic heart failure s/p HM2 LVAD (6/2017), s/p heart transplant from Hep. C donor (treated) 2/23/18  - Intolerant of Cellcept due to leukopenia  - hold home tacro, per heart failure recs  - c/w home prednisone 15mg daily  - c/w Atovaquone for ppx  - c/w nystatin for ppx  - hold home ASA    # Paroxysmal atrial fibrillation.   - Home regimen : Eliquis 5 mg PO BID at home for hx of of afib and IJ thrombi  - EKG on admission - NSR  - hold lopressor 25 BID (3/4)  - off heparin gtt  - hold home ASA    # Vascular disorder of lower extremity   - Extremities appear cool and dry   - c/w local wound care by podiatry     ===================HEMATOLOGIC/ONC ===================  # Hx of hemolytic anemia, Sanchez Syndrome  - Follows with Dr. Rosario as outpatient  - Prednisone 15 mg PO QD --> switched to Solu-cortef 75mg IV q8hr (2/26-3/3) > back to prednisone 15mg (3/3)  - f/u hematology/oncology recs  - Monitor H&H daily  - f/u hemolysis labs and peripheral smear    # DVT ppx: hold home ASA 81mg    ===================== RENAL =========================  # NORMAN on CKD  # ATN 2/2 septic shock  - Scr ranged from 1.3-2.1 in 2023. Most recent Scr was 1.75 on 1/30/24. On admission, Scr was 1.9 >>> 6  - No improvement with IVF, likely ATN i/s/o contrast/medications  - CRRT stopped 3/3 8pm with initial plan for intermittent HD  - trial bumex 4mg IV x1 (3/4) -> no output, bladder scan 3/5 with 180cc urine  - pt on levo s/p cardiac arrest 3/4, likely unable to tolerate HD/CRRT at this time  - sodium bicarb 1300mg TID  - Nephro following: No HD     # Hyperkalemia (resolved)   - K 6.2 at ED arrival  - Shifted in the ED, no EKG changes consistent with severe hyper K. Cr not significantly elevated form baseline, c/f RTA from bactrim and Tacro. Stable   - s/p Lokelma 10mg BID for 3 days  - dialysis as above    ==================== GASTROINTESTINAL===================  # Ileus, resolved  #+/- coffee ground emesis, resolved  - 1 episode of coffee ground emesis, unclear if true GIB  - CTAP (2/22) ileus vs partial SBO - cannot r/o GI bleed  - c/w PPI IV 40 BID  - feeds held 3/4 s/p cardiac arrest  - rising AST/ALT, likely shock liver i/s/o cardiac arrest    =======================    ENDOCRINE  =====================  # DMT2  - A1c 5.8  - ISS    ========================INFECTIOUS DISEASE================  # Septic shock   # Hypothermia  # bacteremia  - Presented with T 91.4F, WBC 21 concerning for possible occult infection,   - U/A without LE or Nitrites, CXR without clear consolidation, MRSA negative Lower concern for meningitis at this moment  - 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 No manifest signs of bowel ischemia. Interval worsening of right lower lobe and right middle lobe atelectasis secondary to elevated right hemidiaphragm when compared to prior CT dated 7/11/2023. Superimposed infection in the collapsed lungs is not excluded  - meropenem (2/21-2/24), restarted meropenem for CNS coverage (2/28 - )  - s/p vancomycin for CNS coverage (2/28 - 3/4)  - s/p zosyn (2/24 - 2/28)  - MRSA swab negative  - hold home Valganciclovir for ppx --> switch to ganciclovir ppx  - f/u infectious labs - Toxoplasma, EBV, Fungitell, Galactomannan, CMV, Cryptococcus, MRSA, ASCENCION virus, CMV, Crypto, ASCENCION virus  - f/u GI PCR, consider C. Diff testing   - Transplant ID recommendations     Code status: LiveOn case   currently on Levo and Vaso   CRRT     Addendum:(5:42pm): The patient's death is not reportable to [facility] office   Discussed with Dr. Negron  ====================ASSESSMENT ==============  75yo M w/ pmhx HTN, HLD, nonischemic cardiomyopathy, chronic systolic heart failure s/p HM2 LVAD (6/2017), s/p heart transplant from Hep. 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 Found to have septic shock, (2/19 BCx pseudo, 2/22 BCx NGTD). Still episodes of hypotension. Worsening NORMAN. Accepted to MICU for CRRT.    Plan:  ====================== NEUROLOGY=====================  # Metabolic encephalopathy  - Likely multi-factorial etiologies: septic vs uremic vs hypercapnic, less likely meningitis  - Patient baseline AOx3. AOx1 at ED arrival, iso of infection vs metabolic derangements  - CTH and angio in ED w/o hemorrhage or stenosis   - spot EEG 2/21: negative for seizures  - s/p PEA arrest (3/4) with approx. 30min downtime  - Currently A&Ox0, unresponsive to pain, pupils fixed and dilated  - CT head (3/5): negative for bleed or lesions  - 24hr EEG (3/6): negative for seizures  - febrile Tmax 104F overnight (3/5) -> tylenol PRN q6hr, cooling blanket  - f/u neuron specific enolase  - plan for MRI brain w/o con 3/9, per neuro recs  - Neuro following  - Monitor mental status per protocol  - s/p precedex gtt 1.5 for agitation (off 3/4)  - s/p seroquel 25mg PO q6hr per psych recs, (off 3/5)  - 3/8 Hypothermic overnight currently on warming blanket     ==================== RESPIRATORY======================  - intubated (3/4) s/p cardiac arrest  - 500/6/18/40%  - CXR with R-sided moderate effusion (3/5)  - MV setting adjustment for target GIGI 35-45 PH 7.45    ====================CARDIOVASCULAR==================  # Septic shock from pseudomonas bacteremia  - POCUS reveals no evidence of  fluid overload  - s/p fluid resuscitation    #PEA arrest (3/4)  - approx. 30min downtime  - Pressors: levo 0.4 (3/4- )  - lactate 7.4, now downtrending lactate 2.3-2.5 remains stable     # h/o heart transplant  - Nonischemic cardiomyopathy, chronic systolic heart failure s/p HM2 LVAD (6/2017), s/p heart transplant from Hep. C donor (treated) 2/23/18  - Intolerant of Cellcept due to leukopenia  - hold home tacro, per heart failure recs  - c/w home prednisone 15mg daily  - c/w Atovaquone for ppx  - c/w nystatin for ppx  - hold home ASA    # Paroxysmal atrial fibrillation.   - Home regimen : Eliquis 5 mg PO BID at home for hx of of afib and IJ thrombi  - EKG on admission - NSR  - hold lopressor 25 BID (3/4)  - off heparin gtt  - hold home ASA    # Vascular disorder of lower extremity   - Extremities appear cool and dry   - c/w local wound care by podiatry     ===================HEMATOLOGIC/ONC ===================  # Hx of hemolytic anemia, Nicole Syndrome  - Follows with Dr. Rosario as outpatient  - Prednisone 15 mg PO QD --> switched to Solu-cortef 75mg IV q8hr (2/26-3/3) > back to prednisone 15mg (3/3)  - f/u hematology/oncology recs  - Monitor H&H daily  - f/u hemolysis labs and peripheral smear    # DVT ppx: hold home ASA 81mg    ===================== RENAL =========================  # NORMAN on CKD  # ATN 2/2 septic shock  - Scr ranged from 1.3-2.1 in 2023. Most recent Scr was 1.75 on 1/30/24. On admission, Scr was 1.9 >>> 6  - No improvement with IVF, likely ATN i/s/o contrast/medications  - CRRT stopped 3/3 8pm with initial plan for intermittent HD  - trial bumex 4mg IV x1 (3/4) -> no output, bladder scan 3/5 with 180cc urine  - pt on levo s/p cardiac arrest 3/4, likely unable to tolerate HD/CRRT at this time  - sodium bicarb 1300mg TID  - Nephro following: No HD     # Hyperkalemia (resolved)   - K 6.2 at ED arrival  - Shifted in the ED, no EKG changes consistent with severe hyper K. Cr not significantly elevated form baseline, c/f RTA from bactrim and Tacro. Stable   - s/p Lokelma 10mg BID for 3 days  - dialysis as above    ==================== GASTROINTESTINAL===================  # Ileus, resolved  #+/- coffee ground emesis, resolved  - 1 episode of coffee ground emesis, unclear if true GIB  - CTAP (2/22) ileus vs partial SBO - cannot r/o GI bleed  - c/w PPI IV 40 BID  - feeds held 3/4 s/p cardiac arrest  - rising AST/ALT, likely shock liver i/s/o cardiac arrest    =======================    ENDOCRINE  =====================  # DMT2  - A1c 5.8  - ISS    ========================INFECTIOUS DISEASE================  # Septic shock   # Hypothermia  # bacteremia  - Presented with T 91.4F, WBC 21 concerning for possible occult infection,   - U/A without LE or Nitrites, CXR without clear consolidation, MRSA negative Lower concern for meningitis at this moment  - 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 No manifest signs of bowel ischemia. Interval worsening of right lower lobe and right middle lobe atelectasis secondary to elevated right hemidiaphragm when compared to prior CT dated 7/11/2023. Superimposed infection in the collapsed lungs is not excluded  - meropenem (2/21-2/24), restarted meropenem for CNS coverage (2/28 - )  - s/p vancomycin for CNS coverage (2/28 - 3/4)  - s/p zosyn (2/24 - 2/28)  - MRSA swab negative  - hold home Valganciclovir for ppx --> switch to ganciclovir ppx  - f/u infectious labs - Toxoplasma, EBV, Fungitell, Galactomannan, CMV, Cryptococcus, MRSA, ASCENCION virus, CMV, Crypto, ASCENCION virus  - f/u GI PCR, consider C. Diff testing   - Transplant ID recommendations     Code status: LiveOn case   currently on Levo and Vaso   CRRT     Addendum:(5:42pm): The patient's death is not reportable to [facility] office   Discussed with Dr. Negron   Health care proxy was contacted and informed ( Marion Quintero)

## 2025-03-09 NOTE — CONSULT NOTE ADULT - SUBJECTIVE AND OBJECTIVE BOX
CC: R/o EAC obstruction for testing of oculovestibular reflex    HPI: 75 y/o Male with PMHx of HTN, HLD, nonischemic cardiomyopathy, chronic systolic heart failure s/p HM2 LVAD (6/2017), s/p heart transplant from Hep. 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 History obtained through chart review. On the phone, the patient's godbrother mentioned that on 2/18, the patient was in the car with his godbrother and was disoriented asking to get off on the road 4 blocks before his house. In addition, a caretaker stated that when she spoke to Mr. Hameed on the phone at 11am on 2/18, he was doing well. However, when the caretaker visited 2 hours later at 1pm, the patient was disoriented and felt his legs were heavy. This prompted the patient to come to the hospital. ENT now consulted to r/o EAC obstruction for testing of oculovestibular reflex. Unable to obtain further history due to pts clinical condition.           PAST MEDICAL & SURGICAL HISTORY:  HTN      SVT (Supraventricular Tachycardia)      Non-Ischemic Cardiomyopathy  now s/p transplant 2018      GIB (gastrointestinal bleeding)      Hepatitis C virus      DVT of upper extremity (deep vein thrombosis)      Former smoker      HLD (hyperlipidemia)      Knee pain, right      H/O autoimmune hemolytic anemia      H/O hemolytic anemia      Atrial fibrillation      Status post left hip replacement      H/O heart transplant  2/2018        Allergies    No Known Allergies    Intolerances      MEDICATIONS  (STANDING):  albuterol/ipratropium for Nebulization 3 milliLiter(s) Nebulizer every 6 hours  artificial  tears Solution 1 Drop(s) Both EYES every 2 hours  atovaquone  Suspension 1500 milliGRAM(s) Oral daily  chlorhexidine 0.12% Liquid 15 milliLiter(s) Oral Mucosa every 12 hours  chlorhexidine 2% Cloths 1 Application(s) Topical daily  cholecalciferol 1000 Unit(s) Oral daily  CRRT Treatment    <Continuous>  ganciclovir IVPB 115 milliGRAM(s) IV Intermittent <User Schedule>  heparin  Infusion Syringe 300 Unit(s)/Hr (0.6 mL/Hr) CRRT <Continuous>  insulin lispro (ADMELOG) corrective regimen sliding scale   SubCutaneous every 6 hours  levothyroxine Infusion 10 MICROgram(s)/Hr (25 mL/Hr) IV Continuous <Continuous>  norepinephrine Infusion 0.7 MICROgram(s)/kG/Min (119 mL/Hr) IV Continuous <Continuous>  nystatin    Suspension 090572 Unit(s) Oral four times a day  pantoprazole  Injectable 40 milliGRAM(s) IV Push two times a day  piperacillin/tazobactam IVPB.. 2.25 Gram(s) IV Intermittent every 8 hours  predniSONE   Tablet 15 milliGRAM(s) Oral daily  PrismaSATE Dialysate BK 0 / 3.5 5000 milliLiter(s) (1000 mL/Hr) CRRT <Continuous>  PrismaSOL Filtration BGK 0 / 2.5 5000 milliLiter(s) (600 mL/Hr) CRRT <Continuous>  PrismaSOL Filtration BGK 4 / 2.5 5000 milliLiter(s) (200 mL/Hr) CRRT <Continuous>  senna 2 Tablet(s) Oral daily  sodium bicarbonate 650 milliGRAM(s) Oral every 8 hours  sodium bicarbonate  Infusion 0.124 mEq/kG/Hr (75 mL/Hr) IV Continuous <Continuous>  vancomycin  IVPB 750 milliGRAM(s) IV Intermittent every 12 hours  vancomycin  IVPB      vasopressin Infusion 0.1 Unit(s)/Min (15 mL/Hr) IV Continuous <Continuous>    MEDICATIONS  (PRN):  acetylcysteine 10%  Inhalation 4 milliLiter(s) Inhalation every 4 hours PRN Thick Secretions      Social History: See H&P    Family history: See H&P    ROS:   Unable to obtain further history due to pts clinical condition.     Vital Signs Last 24 Hrs  T(C): 36.8 (09 Mar 2025 16:25), Max: 37.2 (08 Mar 2025 21:00)  T(F): 98.2 (09 Mar 2025 16:25), Max: 99 (08 Mar 2025 21:00)  HR: 118 (09 Mar 2025 16:25) (109 - 144)  BP: --  BP(mean): --  RR: 14 (09 Mar 2025 16:25) (12 - 20)  SpO2: 100% (09 Mar 2025 16:25) (79% - 100%)    Parameters below as of 09 Mar 2025 16:25  Patient On (Oxygen Delivery Method): ventilator    O2 Concentration (%): 100                          8.4    35.01 )-----------( 190      ( 09 Mar 2025 09:48 )             26.6    03-09    137  |  104  |  64[H]  ----------------------------<  134[H]  5.3   |  13[L]  |  4.53[H]    Ca    7.0[L]      09 Mar 2025 09:48  Phos  7.0     03-09  Mg     2.6     03-09    TPro  4.3[L]  /  Alb  2.0[L]  /  TBili  2.9[H]  /  DBili  1.9[H]  /  AST  930[H]  /  ALT  172[H]  /  AlkPhos  369[H]  03-09   PT/INR - ( 09 Mar 2025 09:48 )   PT: 19.4 sec;   INR: 1.70 ratio         PTT - ( 09 Mar 2025 09:48 )  PTT:30.6 sec    PHYSICAL EXAM:  Gen: NAD  Skin: No rashes  Head: Normocephalic  Face: no edema, erythema, or fluctuance.   Ears: Right - ear canal clear, TM intact without effusion or erythema. No evidence of any fluid drainage. No ear bulging, no outer ear erythema.             Left - Ear canal with cerumen that was removed, TM intact without effusion or erythema. No evidence of any fluid drainage. No ear bulging, no outer ear erythema.   Nose: Nares bilaterally patent, no discharge  Mouth: +ETT in place. No stridor.   Neck: Flat, supple, no lymphadenopathy, trachea midline  Lymphatic: No lymphadenopathy  Resp: on vent, no stridor  CV: no peripheral edema  GI: nondistended   Peripheral vascular: no JVD  Neuro: Unable to assess facial nerve status d/t pts clinical condition.

## 2025-03-10 VITALS — OXYGEN SATURATION: 100 %

## 2025-03-10 LAB
ALBUMIN SERPL ELPH-MCNC: 1.9 G/DL — LOW (ref 3.3–5)
ALP SERPL-CCNC: 229 U/L — HIGH (ref 40–120)
ALT FLD-CCNC: 83 U/L — HIGH (ref 10–45)
ANION GAP SERPL CALC-SCNC: 19 MMOL/L — HIGH (ref 5–17)
APTT BLD: 30.2 SEC — SIGNIFICANT CHANGE UP (ref 24.5–35.6)
AST SERPL-CCNC: 445 U/L — HIGH (ref 10–40)
BASOPHILS # BLD AUTO: 0.01 K/UL — SIGNIFICANT CHANGE UP (ref 0–0.2)
BASOPHILS NFR BLD AUTO: 0.1 % — SIGNIFICANT CHANGE UP (ref 0–2)
BILIRUB SERPL-MCNC: 2.7 MG/DL — HIGH (ref 0.2–1.2)
BUN SERPL-MCNC: 77 MG/DL — HIGH (ref 7–23)
CALCIUM SERPL-MCNC: 6.2 MG/DL — CRITICAL LOW (ref 8.4–10.5)
CHLORIDE SERPL-SCNC: 101 MMOL/L — SIGNIFICANT CHANGE UP (ref 96–108)
CO2 SERPL-SCNC: 20 MMOL/L — LOW (ref 22–31)
CREAT SERPL-MCNC: 4.98 MG/DL — HIGH (ref 0.5–1.3)
EGFR: 12 ML/MIN/1.73M2 — LOW
EGFR: 12 ML/MIN/1.73M2 — LOW
EOSINOPHIL # BLD AUTO: 0 K/UL — SIGNIFICANT CHANGE UP (ref 0–0.5)
EOSINOPHIL NFR BLD AUTO: 0 % — SIGNIFICANT CHANGE UP (ref 0–6)
GAS PNL BLDA: SIGNIFICANT CHANGE UP
GLUCOSE BLDC GLUCOMTR-MCNC: 210 MG/DL — HIGH (ref 70–99)
GLUCOSE BLDC GLUCOMTR-MCNC: 237 MG/DL — HIGH (ref 70–99)
GLUCOSE SERPL-MCNC: 228 MG/DL — HIGH (ref 70–99)
HCT VFR BLD CALC: 21.9 % — LOW (ref 39–50)
HGB BLD-MCNC: 7.2 G/DL — LOW (ref 13–17)
IMM GRANULOCYTES NFR BLD AUTO: 1.1 % — HIGH (ref 0–0.9)
INR BLD: 1.41 RATIO — HIGH (ref 0.85–1.16)
LYMPHOCYTES # BLD AUTO: 0.23 K/UL — LOW (ref 1–3.3)
LYMPHOCYTES # BLD AUTO: 1.2 % — LOW (ref 13–44)
MAGNESIUM SERPL-MCNC: 2.2 MG/DL — SIGNIFICANT CHANGE UP (ref 1.6–2.6)
MCHC RBC-ENTMCNC: 30.5 PG — SIGNIFICANT CHANGE UP (ref 27–34)
MCHC RBC-ENTMCNC: 32.9 G/DL — SIGNIFICANT CHANGE UP (ref 32–36)
MCV RBC AUTO: 92.8 FL — SIGNIFICANT CHANGE UP (ref 80–100)
MONOCYTES # BLD AUTO: 0.47 K/UL — SIGNIFICANT CHANGE UP (ref 0–0.9)
MONOCYTES NFR BLD AUTO: 2.4 % — SIGNIFICANT CHANGE UP (ref 2–14)
NEUTROPHILS # BLD AUTO: 18.59 K/UL — HIGH (ref 1.8–7.4)
NEUTROPHILS NFR BLD AUTO: 95.2 % — HIGH (ref 43–77)
NRBC BLD AUTO-RTO: 3 /100 WBCS — HIGH (ref 0–0)
PHOSPHATE SERPL-MCNC: 6.7 MG/DL — HIGH (ref 2.5–4.5)
PLATELET # BLD AUTO: 90 K/UL — LOW (ref 150–400)
POTASSIUM SERPL-MCNC: 4.2 MMOL/L — SIGNIFICANT CHANGE UP (ref 3.5–5.3)
POTASSIUM SERPL-SCNC: 4.2 MMOL/L — SIGNIFICANT CHANGE UP (ref 3.5–5.3)
PROT SERPL-MCNC: 3.8 G/DL — LOW (ref 6–8.3)
PROTHROM AB SERPL-ACNC: 16.2 SEC — HIGH (ref 9.9–13.4)
RBC # BLD: 2.36 M/UL — LOW (ref 4.2–5.8)
RBC # FLD: 20.6 % — HIGH (ref 10.3–14.5)
SODIUM SERPL-SCNC: 140 MMOL/L — SIGNIFICANT CHANGE UP (ref 135–145)
WBC # BLD: 19.51 K/UL — HIGH (ref 3.8–10.5)
WBC # FLD AUTO: 19.51 K/UL — HIGH (ref 3.8–10.5)

## 2025-03-10 PROCEDURE — 83880 ASSAY OF NATRIURETIC PEPTIDE: CPT

## 2025-03-10 PROCEDURE — 87640 STAPH A DNA AMP PROBE: CPT

## 2025-03-10 PROCEDURE — 86922 COMPATIBILITY TEST ANTIGLOB: CPT

## 2025-03-10 PROCEDURE — A9585: CPT

## 2025-03-10 PROCEDURE — 87476 LYME DIS DNA AMP PROBE: CPT

## 2025-03-10 PROCEDURE — 93971 EXTREMITY STUDY: CPT

## 2025-03-10 PROCEDURE — 80048 BASIC METABOLIC PNL TOTAL CA: CPT

## 2025-03-10 PROCEDURE — 86140 C-REACTIVE PROTEIN: CPT

## 2025-03-10 PROCEDURE — 87186 SC STD MICRODIL/AGAR DIL: CPT

## 2025-03-10 PROCEDURE — 86255 FLUORESCENT ANTIBODY SCREEN: CPT

## 2025-03-10 PROCEDURE — 95711 VEEG 2-12 HR UNMONITORED: CPT

## 2025-03-10 PROCEDURE — 84132 ASSAY OF SERUM POTASSIUM: CPT

## 2025-03-10 PROCEDURE — 80061 LIPID PANEL: CPT

## 2025-03-10 PROCEDURE — 87641 MR-STAPH DNA AMP PROBE: CPT

## 2025-03-10 PROCEDURE — 82248 BILIRUBIN DIRECT: CPT

## 2025-03-10 PROCEDURE — 94640 AIRWAY INHALATION TREATMENT: CPT

## 2025-03-10 PROCEDURE — 70553 MRI BRAIN STEM W/O & W/DYE: CPT | Mod: MC

## 2025-03-10 PROCEDURE — 71250 CT THORAX DX C-: CPT | Mod: MC

## 2025-03-10 PROCEDURE — 36600 WITHDRAWAL OF ARTERIAL BLOOD: CPT

## 2025-03-10 PROCEDURE — 93923 UPR/LXTR ART STDY 3+ LVLS: CPT

## 2025-03-10 PROCEDURE — 82010 KETONE BODYS QUAN: CPT

## 2025-03-10 PROCEDURE — 36430 TRANSFUSION BLD/BLD COMPNT: CPT

## 2025-03-10 PROCEDURE — 0225U NFCT DS DNA&RNA 21 SARSCOV2: CPT

## 2025-03-10 PROCEDURE — 85520 HEPARIN ASSAY: CPT

## 2025-03-10 PROCEDURE — 71552 MRI CHEST W/O & W/DYE: CPT | Mod: MC

## 2025-03-10 PROCEDURE — 85018 HEMOGLOBIN: CPT

## 2025-03-10 PROCEDURE — 87798 DETECT AGENT NOS DNA AMP: CPT

## 2025-03-10 PROCEDURE — 84207 ASSAY OF VITAMIN B-6: CPT

## 2025-03-10 PROCEDURE — P9045: CPT

## 2025-03-10 PROCEDURE — 87799 DETECT AGENT NOS DNA QUANT: CPT

## 2025-03-10 PROCEDURE — 93005 ELECTROCARDIOGRAM TRACING: CPT

## 2025-03-10 PROCEDURE — 74176 CT ABD & PELVIS W/O CONTRAST: CPT | Mod: MC

## 2025-03-10 PROCEDURE — 85014 HEMATOCRIT: CPT

## 2025-03-10 PROCEDURE — 83615 LACTATE (LD) (LDH) ENZYME: CPT

## 2025-03-10 PROCEDURE — 81001 URINALYSIS AUTO W/SCOPE: CPT

## 2025-03-10 PROCEDURE — 95700 EEG CONT REC W/VID EEG TECH: CPT

## 2025-03-10 PROCEDURE — 86780 TREPONEMA PALLIDUM: CPT

## 2025-03-10 PROCEDURE — 83036 HEMOGLOBIN GLYCOSYLATED A1C: CPT

## 2025-03-10 PROCEDURE — 86850 RBC ANTIBODY SCREEN: CPT

## 2025-03-10 PROCEDURE — 86788 WEST NILE VIRUS AB IGM: CPT

## 2025-03-10 PROCEDURE — 81003 URINALYSIS AUTO W/O SCOPE: CPT

## 2025-03-10 PROCEDURE — 84443 ASSAY THYROID STIM HORMONE: CPT

## 2025-03-10 PROCEDURE — 86618 LYME DISEASE ANTIBODY: CPT

## 2025-03-10 PROCEDURE — 96374 THER/PROPH/DIAG INJ IV PUSH: CPT

## 2025-03-10 PROCEDURE — 87637 SARSCOV2&INF A&B&RSV AMP PRB: CPT

## 2025-03-10 PROCEDURE — 86706 HEP B SURFACE ANTIBODY: CPT

## 2025-03-10 PROCEDURE — 82140 ASSAY OF AMMONIA: CPT

## 2025-03-10 PROCEDURE — P9059: CPT

## 2025-03-10 PROCEDURE — 99285 EMERGENCY DEPT VISIT HI MDM: CPT

## 2025-03-10 PROCEDURE — 96375 TX/PRO/DX INJ NEW DRUG ADDON: CPT

## 2025-03-10 PROCEDURE — 85045 AUTOMATED RETICULOCYTE COUNT: CPT

## 2025-03-10 PROCEDURE — 76705 ECHO EXAM OF ABDOMEN: CPT

## 2025-03-10 PROCEDURE — 85025 COMPLETE CBC W/AUTO DIFF WBC: CPT

## 2025-03-10 PROCEDURE — 71045 X-RAY EXAM CHEST 1 VIEW: CPT

## 2025-03-10 PROCEDURE — 82607 VITAMIN B-12: CPT

## 2025-03-10 PROCEDURE — 82150 ASSAY OF AMYLASE: CPT

## 2025-03-10 PROCEDURE — 94003 VENT MGMT INPAT SUBQ DAY: CPT

## 2025-03-10 PROCEDURE — 85730 THROMBOPLASTIN TIME PARTIAL: CPT

## 2025-03-10 PROCEDURE — P9040: CPT

## 2025-03-10 PROCEDURE — 83520 IMMUNOASSAY QUANT NOS NONAB: CPT

## 2025-03-10 PROCEDURE — 87086 URINE CULTURE/COLONY COUNT: CPT

## 2025-03-10 PROCEDURE — 36415 COLL VENOUS BLD VENIPUNCTURE: CPT

## 2025-03-10 PROCEDURE — 87340 HEPATITIS B SURFACE AG IA: CPT

## 2025-03-10 PROCEDURE — 85652 RBC SED RATE AUTOMATED: CPT

## 2025-03-10 PROCEDURE — 86777 TOXOPLASMA ANTIBODY: CPT

## 2025-03-10 PROCEDURE — 83010 ASSAY OF HAPTOGLOBIN QUANT: CPT

## 2025-03-10 PROCEDURE — 93356 MYOCRD STRAIN IMG SPCKL TRCK: CPT

## 2025-03-10 PROCEDURE — 82247 BILIRUBIN TOTAL: CPT

## 2025-03-10 PROCEDURE — 83519 RIA NONANTIBODY: CPT

## 2025-03-10 PROCEDURE — 95714 VEEG EA 12-26 HR UNMNTR: CPT

## 2025-03-10 PROCEDURE — 87150 DNA/RNA AMPLIFIED PROBE: CPT

## 2025-03-10 PROCEDURE — 70450 CT HEAD/BRAIN W/O DYE: CPT | Mod: MC

## 2025-03-10 PROCEDURE — 84100 ASSAY OF PHOSPHORUS: CPT

## 2025-03-10 PROCEDURE — 86900 BLOOD TYPING SEROLOGIC ABO: CPT

## 2025-03-10 PROCEDURE — 82977 ASSAY OF GGT: CPT

## 2025-03-10 PROCEDURE — 71045 X-RAY EXAM CHEST 1 VIEW: CPT | Mod: 26

## 2025-03-10 PROCEDURE — 83735 ASSAY OF MAGNESIUM: CPT

## 2025-03-10 PROCEDURE — 85384 FIBRINOGEN ACTIVITY: CPT

## 2025-03-10 PROCEDURE — 82330 ASSAY OF CALCIUM: CPT

## 2025-03-10 PROCEDURE — 86901 BLOOD TYPING SEROLOGIC RH(D): CPT

## 2025-03-10 PROCEDURE — 83690 ASSAY OF LIPASE: CPT

## 2025-03-10 PROCEDURE — 84484 ASSAY OF TROPONIN QUANT: CPT

## 2025-03-10 PROCEDURE — 87077 CULTURE AEROBIC IDENTIFY: CPT

## 2025-03-10 PROCEDURE — 86341 ISLET CELL ANTIBODY: CPT

## 2025-03-10 PROCEDURE — 70496 CT ANGIOGRAPHY HEAD: CPT | Mod: MC

## 2025-03-10 PROCEDURE — 93010 ELECTROCARDIOGRAM REPORT: CPT

## 2025-03-10 PROCEDURE — 94002 VENT MGMT INPAT INIT DAY: CPT

## 2025-03-10 PROCEDURE — 74018 RADEX ABDOMEN 1 VIEW: CPT

## 2025-03-10 PROCEDURE — 74177 CT ABD & PELVIS W/CONTRAST: CPT | Mod: MC

## 2025-03-10 PROCEDURE — 76942 ECHO GUIDE FOR BIOPSY: CPT

## 2025-03-10 PROCEDURE — 85027 COMPLETE CBC AUTOMATED: CPT

## 2025-03-10 PROCEDURE — 47000 NEEDLE BIOPSY OF LIVER PERQ: CPT

## 2025-03-10 PROCEDURE — 86704 HEP B CORE ANTIBODY TOTAL: CPT

## 2025-03-10 PROCEDURE — 97164 PT RE-EVAL EST PLAN CARE: CPT

## 2025-03-10 PROCEDURE — 87507 IADNA-DNA/RNA PROBE TQ 12-25: CPT

## 2025-03-10 PROCEDURE — 82962 GLUCOSE BLOOD TEST: CPT

## 2025-03-10 PROCEDURE — 82435 ASSAY OF BLOOD CHLORIDE: CPT

## 2025-03-10 PROCEDURE — 86789 WEST NILE VIRUS ANTIBODY: CPT

## 2025-03-10 PROCEDURE — 97162 PT EVAL MOD COMPLEX 30 MIN: CPT

## 2025-03-10 PROCEDURE — 82746 ASSAY OF FOLIC ACID SERUM: CPT

## 2025-03-10 PROCEDURE — 87529 HSV DNA AMP PROBE: CPT

## 2025-03-10 PROCEDURE — 93306 TTE W/DOPPLER COMPLETE: CPT

## 2025-03-10 PROCEDURE — 80197 ASSAY OF TACROLIMUS: CPT

## 2025-03-10 PROCEDURE — 70498 CT ANGIOGRAPHY NECK: CPT | Mod: MC

## 2025-03-10 PROCEDURE — 82803 BLOOD GASES ANY COMBINATION: CPT

## 2025-03-10 PROCEDURE — 82947 ASSAY GLUCOSE BLOOD QUANT: CPT

## 2025-03-10 PROCEDURE — 85397 CLOTTING FUNCT ACTIVITY: CPT

## 2025-03-10 PROCEDURE — 84295 ASSAY OF SERUM SODIUM: CPT

## 2025-03-10 PROCEDURE — 87449 NOS EACH ORGANISM AG IA: CPT

## 2025-03-10 PROCEDURE — 86778 TOXOPLASMA ANTIBODY IGM: CPT

## 2025-03-10 PROCEDURE — 82550 ASSAY OF CK (CPK): CPT

## 2025-03-10 PROCEDURE — 87305 ASPERGILLUS AG IA: CPT

## 2025-03-10 PROCEDURE — 80202 ASSAY OF VANCOMYCIN: CPT

## 2025-03-10 PROCEDURE — 87040 BLOOD CULTURE FOR BACTERIA: CPT

## 2025-03-10 PROCEDURE — 85610 PROTHROMBIN TIME: CPT

## 2025-03-10 PROCEDURE — 83605 ASSAY OF LACTIC ACID: CPT

## 2025-03-10 PROCEDURE — 84550 ASSAY OF BLOOD/URIC ACID: CPT

## 2025-03-10 PROCEDURE — 86403 PARTICLE AGGLUT ANTBDY SCRN: CPT

## 2025-03-10 PROCEDURE — 80053 COMPREHEN METABOLIC PANEL: CPT

## 2025-03-10 PROCEDURE — 82164 ANGIOTENSIN I ENZYME TEST: CPT

## 2025-03-10 PROCEDURE — 99232 SBSQ HOSP IP/OBS MODERATE 35: CPT | Mod: GC

## 2025-03-10 RX ORDER — HEPARIN SODIUM 1000 [USP'U]/ML
300 INJECTION INTRAVENOUS; SUBCUTANEOUS
Qty: 10000 | Refills: 0 | Status: DISCONTINUED | OUTPATIENT
Start: 2025-03-10 | End: 2025-03-10

## 2025-03-10 RX ADMIN — Medication 1 DROP(S): at 03:40

## 2025-03-10 RX ADMIN — Medication 1 DROP(S): at 10:20

## 2025-03-10 RX ADMIN — Medication 15 MILLILITER(S): at 06:36

## 2025-03-10 RX ADMIN — PREDNISONE 15 MILLIGRAM(S): 20 TABLET ORAL at 06:36

## 2025-03-10 RX ADMIN — Medication 1 DROP(S): at 06:33

## 2025-03-10 RX ADMIN — Medication 650 MILLIGRAM(S): at 06:36

## 2025-03-10 RX ADMIN — IPRATROPIUM BROMIDE AND ALBUTEROL SULFATE 3 MILLILITER(S): .5; 2.5 SOLUTION RESPIRATORY (INHALATION) at 05:40

## 2025-03-10 RX ADMIN — INSULIN LISPRO 2: 100 INJECTION, SOLUTION INTRAVENOUS; SUBCUTANEOUS at 06:35

## 2025-03-10 RX ADMIN — INSULIN LISPRO 2: 100 INJECTION, SOLUTION INTRAVENOUS; SUBCUTANEOUS at 00:37

## 2025-03-10 RX ADMIN — Medication 1 DROP(S): at 08:18

## 2025-03-10 RX ADMIN — Medication 1 DROP(S): at 00:39

## 2025-03-10 RX ADMIN — Medication 500000 UNIT(S): at 06:36

## 2025-03-10 RX ADMIN — Medication 1 DROP(S): at 01:15

## 2025-03-10 RX ADMIN — Medication 40 MILLIGRAM(S): at 06:33

## 2025-03-10 RX ADMIN — Medication 500000 UNIT(S): at 00:39

## 2025-03-10 NOTE — PROGRESS NOTE ADULT - SUBJECTIVE AND OBJECTIVE BOX
SICU Daily Progress Note  =====================================================  Interval/Overnight Events:       HPI:    Allergies: No Known Allergies      MEDICATIONS:   --------------------------------------------------------------------------------------  Neurologic Medications    Respiratory Medications  acetylcysteine 10%  Inhalation 4 milliLiter(s) Inhalation every 4 hours PRN Thick Secretions  albuterol/ipratropium for Nebulization 3 milliLiter(s) Nebulizer every 6 hours    Cardiovascular Medications  norepinephrine Infusion 0.7 MICROgram(s)/kG/Min IV Continuous <Continuous>    Gastrointestinal Medications  cholecalciferol 1000 Unit(s) Oral daily  pantoprazole  Injectable 40 milliGRAM(s) IV Push two times a day  senna 2 Tablet(s) Oral daily  sodium bicarbonate 650 milliGRAM(s) Oral every 8 hours  sodium bicarbonate  Infusion 0.124 mEq/kG/Hr IV Continuous <Continuous>    Genitourinary Medications    Hematologic/Oncologic Medications  heparin  Infusion Syringe 300 Unit(s)/Hr CRRT <Continuous>    Antimicrobial/Immunologic Medications  atovaquone  Suspension 1500 milliGRAM(s) Oral daily  ganciclovir IVPB 115 milliGRAM(s) IV Intermittent <User Schedule>  nystatin    Suspension 582418 Unit(s) Oral four times a day    Endocrine/Metabolic Medications  insulin lispro (ADMELOG) corrective regimen sliding scale   SubCutaneous every 6 hours  levothyroxine Infusion 10 MICROgram(s)/Hr IV Continuous <Continuous>  predniSONE   Tablet 15 milliGRAM(s) Oral daily  vasopressin Infusion 0.1 Unit(s)/Min IV Continuous <Continuous>    Topical/Other Medications  artificial  tears Solution 1 Drop(s) Both EYES every 2 hours  chlorhexidine 0.12% Liquid 15 milliLiter(s) Oral Mucosa every 12 hours  chlorhexidine 2% Cloths 1 Application(s) Topical daily  CRRT Treatment    <Continuous>  PrismaSATE Dialysate BK 0 / 3.5 5000 milliLiter(s) CRRT <Continuous>  PrismaSOL Filtration BGK 0 / 2.5 5000 milliLiter(s) CRRT <Continuous>  PrismaSOL Filtration BGK 4 / 2.5 5000 milliLiter(s) CRRT <Continuous>    --------------------------------------------------------------------------------------    VITAL SIGNS, INS/OUTS (last 24 hours):  --------------------------------------------------------------------------------------  T(C): 36.6 (03-10-25 @ 07:00), Max: 37.1 (03-09-25 @ 19:00)  HR: 109 (03-10-25 @ 07:30) (109 - 138)  BP: --  RR: 14 (03-10-25 @ 07:30) (12 - 18)  SpO2: 96% (03-10-25 @ 07:30) (79% - 100%)    03-09-25 @ 07:01  -  03-10-25 @ 07:00  --------------------------------------------------------  IN: 4725.9 mL / OUT: 190 mL / NET: 4535.9 mL      --------------------------------------------------------------------------------------    EXAM  NEUROLOGY  Exam: Normal, NAD, alert, oriented x3, no focal deficits.    HEENT  Exam: Normocephalic, atraumatic, EOMI.     RESPIRATORY  Exam: Normal expansion/effort.  Mechanical Ventilation:     CARDIOVASCULAR  Exam: Regular rate and rhythm.       GI/NUTRITION  Exam: Abdomen soft, Non-tender, Non-distended.     VASCULAR  Exam: Extremities warm, pink, well-perfused.     MUSCULOSKELETAL  Exam: All extremities moving spontaneously without limitations.     SKIN  Exam: Good skin turgor, no skin breakdown.       LABS  --------------------------------------------------------------------------------------                        7.2    19.51 )-----------( 90       ( 10 Mar 2025 00:50 )             21.9   03-10    140  |  101  |  77[H]  ----------------------------<  228[H]  4.2   |  20[L]  |  4.98[H]    Ca    6.2[LL]      10 Mar 2025 00:50  Phos  6.7     03-10  Mg     2.2     03-10    TPro  3.8[L]  /  Alb  1.9[L]  /  TBili  2.7[H]  /  DBili  x   /  AST  445[H]  /  ALT  83[H]  /  AlkPhos  229[H]  03-10  ABG - ( 10 Mar 2025 00:37 )  pH, Arterial: 7.43  pH, Blood: x     /  pCO2: 35    /  pO2: 142   / HCO3: 23    / Base Excess: -0.9  /  SaO2: 99.8            Urinalysis Basic - ( 10 Mar 2025 00:50 )    Color: x / Appearance: x / SG: x / pH: x  Gluc: 228 mg/dL / Ketone: x  / Bili: x / Urobili: x   Blood: x / Protein: x / Nitrite: x   Leuk Esterase: x / RBC: x / WBC x   Sq Epi: x / Non Sq Epi: x / Bacteria: x    PT/INR - ( 10 Mar 2025 00:50 )   PT: 16.2 sec;   INR: 1.41 ratio         PTT - ( 10 Mar 2025 00:50 )  PTT:30.2 sec  --------------------------------------------------------------------------------------     CICU Daily Progress Note  =====================================================    Interval/Overnight Events:       HPI: 74M pmhx HTN, HLD, nonischemic cardiomyopathy, chronic systolic heart failure s/p HM2 LVAD (6/2017), s/p heart transplant from Hep. 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 On the phone, the patient's godbrother mentioned that on 2/18, the patient was in the car with his godbrother and was disoriented asking to get off on the road 4 blocks before his house. In addition, a caretaker stated that when she spoke to Mr. Hameed on the phone at 11am on 2/18, he was doing well. However, when the caretaker visited 2 hours later at 1pm, the patient was disoriented and felt his legs were heavy. This prompted the patient to come to the hospital.   In the ED: Hypothermic 91.4, HR 80, /60, RA   Patient noted to be Hyperkalemic (6.2) with Mixed respiratory and metabolic acidosis pH 7.2. - Patient was given Calcium Gluconate 2g, D50/Insulin 5 unit, 40 mg IV lasix, Lokelma 10mg, Vancomycin and Zosyn.     Interval history: Patient declared brain dead yesterday. Now s/p liver biopsy 3/9 with necrosis. Not a candidate for organ donation. Will discuss with family today regarding extubation.     Allergies: No Known Allergies    MEDICATIONS:   --------------------------------------------------------------------------------------  Neurologic Medications    Respiratory Medications  acetylcysteine 10%  Inhalation 4 milliLiter(s) Inhalation every 4 hours PRN Thick Secretions  albuterol/ipratropium for Nebulization 3 milliLiter(s) Nebulizer every 6 hours    Cardiovascular Medications  norepinephrine Infusion 0.7 MICROgram(s)/kG/Min IV Continuous <Continuous>    Gastrointestinal Medications  cholecalciferol 1000 Unit(s) Oral daily  pantoprazole  Injectable 40 milliGRAM(s) IV Push two times a day  senna 2 Tablet(s) Oral daily  sodium bicarbonate 650 milliGRAM(s) Oral every 8 hours  sodium bicarbonate  Infusion 0.124 mEq/kG/Hr IV Continuous <Continuous>    Genitourinary Medications    Hematologic/Oncologic Medications  heparin  Infusion Syringe 300 Unit(s)/Hr CRRT <Continuous>    Antimicrobial/Immunologic Medications  atovaquone  Suspension 1500 milliGRAM(s) Oral daily  ganciclovir IVPB 115 milliGRAM(s) IV Intermittent <User Schedule>  nystatin    Suspension 603571 Unit(s) Oral four times a day    Endocrine/Metabolic Medications  insulin lispro (ADMELOG) corrective regimen sliding scale   SubCutaneous every 6 hours  levothyroxine Infusion 10 MICROgram(s)/Hr IV Continuous <Continuous>  predniSONE   Tablet 15 milliGRAM(s) Oral daily  vasopressin Infusion 0.1 Unit(s)/Min IV Continuous <Continuous>    Topical/Other Medications  artificial  tears Solution 1 Drop(s) Both EYES every 2 hours  chlorhexidine 0.12% Liquid 15 milliLiter(s) Oral Mucosa every 12 hours  chlorhexidine 2% Cloths 1 Application(s) Topical daily  CRRT Treatment    <Continuous>  PrismaSATE Dialysate BK 0 / 3.5 5000 milliLiter(s) CRRT <Continuous>  PrismaSOL Filtration BGK 0 / 2.5 5000 milliLiter(s) CRRT <Continuous>  PrismaSOL Filtration BGK 4 / 2.5 5000 milliLiter(s) CRRT <Continuous>    --------------------------------------------------------------------------------------    VITAL SIGNS, INS/OUTS (last 24 hours):  --------------------------------------------------------------------------------------  ICU Vital Signs Last 24 Hrs  T(C): 36.7 (10 Mar 2025 10:30), Max: 37.1 (09 Mar 2025 19:00)  T(F): 98.1 (10 Mar 2025 10:30), Max: 98.8 (09 Mar 2025 19:00)  HR: 0 (10 Mar 2025 11:25) (0 - 130)  ABP: 103/49 (10 Mar 2025 11:15) (93/38 - 179/66)  ABP(mean): 62 (10 Mar 2025 11:15) (55 - 100)  RR: 14 (10 Mar 2025 11:15) (14 - 15)  SpO2: 100% (10 Mar 2025 11:25) (90% - 100%)    O2 Parameters below as of 10 Mar 2025 10:45  Patient On (Oxygen Delivery Method): ventilator    O2 Concentration (%): 40    ---------------------------------------------------------------  EXAM  Appearance: Normal, NAD  HEAD:   Normocephalic  EYES: fixed dilated pupils conjunctiva and sclera clear  NECK: Supple  CHEST/LUNG: Intubated, symmetric chest rise.    HEART: Tachycardic   ABDOMEN: Soft, NT, ND   EXTREMITIES:  1+ Peripheral Pulses, + edema bilateral UE/L  NEUROLOGY: No responses by obnoxious stimuli    LABS  --------------------------------------------------------------------------------------                        7.2    19.51 )-----------( 90       ( 10 Mar 2025 00:50 )             21.9   03-10    140  |  101  |  77[H]  ----------------------------<  228[H]  4.2   |  20[L]  |  4.98[H]    Ca    6.2[LL]      10 Mar 2025 00:50  Phos  6.7     03-10  Mg     2.2     03-10    TPro  3.8[L]  /  Alb  1.9[L]  /  TBili  2.7[H]  /  DBili  x   /  AST  445[H]  /  ALT  83[H]  /  AlkPhos  229[H]  03-10  ABG - ( 10 Mar 2025 00:37 )  pH, Arterial: 7.43  pH, Blood: x     /  pCO2: 35    /  pO2: 142   / HCO3: 23    / Base Excess: -0.9  /  SaO2: 99.8      Urinalysis Basic - ( 10 Mar 2025 00:50 )  Color: x / Appearance: x / SG: x / pH: x  Gluc: 228 mg/dL / Ketone: x  / Bili: x / Urobili: x   Blood: x / Protein: x / Nitrite: x   Leuk Esterase: x / RBC: x / WBC x   Sq Epi: x / Non Sq Epi: x / Bacteria: x    PT/INR - ( 10 Mar 2025 00:50 )   PT: 16.2 sec;   INR: 1.41 ratio    PTT - ( 10 Mar 2025 00:50 )  PTT:30.2 sec  --------------------------------------------------------------------------------------

## 2025-03-10 NOTE — PROGRESS NOTE ADULT - ATTENDING SUPERVISION STATEMENT
Fellow
Resident
Resident
Fellow
Resident
Fellow
Fellow
Resident
Fellow
Resident
Resident/Fellow
Fellow
Resident/Fellow
Fellow
Fellow
Resident
Resident/Fellow
Fellow
Resident/Student
Fellow
Fellow
Resident
Fellow
Resident

## 2025-03-10 NOTE — PROGRESS NOTE ADULT - SUBJECTIVE AND OBJECTIVE BOX
[facility] Division of Kidney Diseases & Hypertension  FOLLOW UP NOTE  [phone]--------------------------------------------------------------------------------  Chief Complaint: NORMAN on CKD    24 hour events/subjective: Pt seen and evaluated this morning in the ICU. Pt intubated, however no mental status off sedation and off CRRT since 3/9.     PAST HISTORY  --------------------------------------------------------------------------------  No significant changes to PMH, PSH, FHx, SHx, unless otherwise noted    ALLERGIES & MEDICATIONS  --------------------------------------------------------------------------------  Allergies    No Known Allergies    Intolerances    Standing Inpatient Medications    PRN Inpatient Medications    REVIEW OF SYSTEMS  --------------------------------------------------------------------------------  see above    VITALS/PHYSICAL EXAM  --------------------------------------------------------------------------------  T(C): 36.7 (03-10-25 @ 10:30), Max: 37.1 (03-09-25 @ 19:00)  HR: 0 (03-10-25 @ 11:25) (0 - 130)  BP: --  RR: 14 (03-10-25 @ 11:15) (14 - 15)  SpO2: 100% (03-10-25 @ 11:25) (90% - 100%)  Wt(kg): --    03-09-25 @ 07:01  -  03-10-25 @ 07:00  --------------------------------------------------------  IN: 4725.9 mL / OUT: 190 mL / NET: 4535.9 mL    Physical Exam:  Gen: ill-appearing  HEENT: +ETT  Pulm: CTA B/L  CV: S1S2  Abd: Soft, +BS   Ext: No LE edema B/L  Neuro: Off sedation and w/o mental status, no response to verbal or tactile stimuli   Skin: Warm and dry  Dialysis access: [facility]    LABS/STUDIES  --------------------------------------------------------------------------------              7.2    19.51 >-----------<  90       [03-10-25 @ 00:50]              21.9     140  |  101  |  77  ----------------------------<  228      [03-10-25 @ 00:50]  4.2   |  20  |  4.98        Ca     6.2     [03-10-25 @ 00:50]      Mg     2.2     [03-10-25 @ 00:50]      Phos  6.7     [03-10-25 @ 00:50]    TPro  3.8  /  Alb  1.9  /  TBili  2.7  /  DBili  x   /  AST  445  /  ALT  83  /  AlkPhos  229  [03-10-25 @ 00:50]    PT/INR: PT 16.2 , INR 1.41       [03-10-25 @ 00:50]  PTT: 30.2       [03-10-25 @ 00:50]    LDH 1878      [03-09-25 @ 17:28]    Creatinine Trend:  SCr 4.98 [03-10 @ 00:50]  SCr 4.82 [03-09 @ 17:28]  SCr 4.53 [03-09 @ 09:48]  SCr 5.13 [03-09 @ 05:26]  SCr 7.32 [03-08 @ 22:36]    HBsAb <3.0      [03-05-25 @ 06:51]  HBsAg Nonreact      [03-05-25 @ 06:51]  HBcAb Nonreact      [03-05-25 @ 06:51]

## 2025-03-10 NOTE — DISCHARGE NOTE FOR THE EXPIRED PATIENT - HOSPITAL COURSE
73yo M w/ pmhx HTN, HLD, nonischemic cardiomyopathy, chronic systolic heart failure s/p HM2 LVAD (6/2017), s/p heart transplant from Hep. 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 On the phone, the patient's godbrother mentioned that on 2/18, the patient was in the car with his godbrother and was disoriented asking to get off on the road 4 blocks before his house. In addition, a caretaker stated that when she spoke to Mr. Hameed on the phone at 11am on 2/18, he was doing well. However, when the caretaker visited 2 hours later at 1pm, the patient was disoriented and felt his legs were heavy. This prompted the patient to come to the hospital. In the ED: Hypothermic 91.4, HR 80, /60. Pt noted to be Hyperkalemic (6.2) with Mixed respiratory and metabolic acidosis pH 7.2. Pt was given Calcium Gluconate 2g, D50/Insulin 5 unit, 40 mg IV lasix, Lokelma 10mg. Vancomycin and Zosyn.     On the floor, pt was receiving midodrine vanc, zosyn for septic shock. BCx 2/19 positive for pseudo, BCx 2/22 NGTD. NORMAN worsened. Patient persistently altered and meropenem started. Accepted to MICU for CRRT. L fem vein double-tunneled catheter placed. CVVHD was started per nephrology. Patient required Levophed and albumin due to hypotension with CVVHD. NGT was placed to LIS for patient's ileus. Patient with a large BM. Patient's ASA and DVT ppx restarted given no acute source of bleeding. Pending abdominal X-ray given potential improvement in ileus given BM and hypoactive bowel sounds. Fluids stopped.    High concern for meningitis, treated prophylactically. Patient on/off CRRT with trial of iHD however patient's course complicated by PEA arrest preceded by bradycardia. Downtime 30 minutes. Neurology consulted, patient ultimately decleared brain dead by Dr. Gonzales at 17:00 on 3/09. Family aware, patient was pending liver donation however unable to proceed. Family agreed to withdraw care on 3/10 with circulatory arrest at 11:28am following extubation and discontinuation of medications.

## 2025-03-10 NOTE — PROGRESS NOTE ADULT - ASSESSMENT
====================ASSESSMENT ==============  73yo M w/ pmhx HTN, HLD, nonischemic cardiomyopathy, chronic systolic heart failure s/p HM2 LVAD (6/2017), s/p heart transplant from Hep. 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 Found to have septic shock, (2/19 BCx pseudo, 2/22 BCx NGTD). Still episodes of hypotension. Worsening NORMAN. Accepted to MICU for CRRT.    Plan:  ====================== NEUROLOGY=====================  # Metabolic encephalopathy  - Likely multi-factorial etiologies: septic vs uremic vs hypercapnic, less likely meningitis  - Patient baseline AOx3. AOx1 at ED arrival, iso of infection vs metabolic derangements  - CTH and angio in ED w/o hemorrhage or stenosis   - spot EEG 2/21: negative for seizures  - s/p PEA arrest (3/4) with approx. 30min downtime  - Currently A&Ox0, unresponsive to pain, pupils fixed and dilated  - CT head (3/5): negative for bleed or lesions  - 24hr EEG (3/6): negative for seizures  - febrile Tmax 104F overnight (3/5) -> tylenol PRN q6hr, cooling blanket  - f/u neuron specific enolase  - plan for MRI brain w/o con 3/9, per neuro recs  - Neuro following  - Monitor mental status per protocol  - s/p precedex gtt 1.5 for agitation (off 3/4)  - s/p seroquel 25mg PO q6hr per psych recs, (off 3/5)  - 3/8 Hypothermic overnight currently on warming blanket     ==================== RESPIRATORY======================  - intubated (3/4) s/p cardiac arrest  - 500/6/18/40%  - CXR with R-sided moderate effusion (3/5)  - MV setting adjustment for target GIGI 35-45 PH 7.45    ====================CARDIOVASCULAR==================  # Septic shock from pseudomonas bacteremia  - POCUS reveals no evidence of  fluid overload  - s/p fluid resuscitation    #PEA arrest (3/4)  - approx. 30min downtime  - Pressors: levo 0.4 (3/4- )  - lactate 7.4, now downtrending lactate 2.3-2.5 remains stable     # h/o heart transplant  - Nonischemic cardiomyopathy, chronic systolic heart failure s/p HM2 LVAD (6/2017), s/p heart transplant from Hep. C donor (treated) 2/23/18  - Intolerant of Cellcept due to leukopenia  - hold home tacro, per heart failure recs  - c/w home prednisone 15mg daily  - c/w Atovaquone for ppx  - c/w nystatin for ppx  - hold home ASA    # Paroxysmal atrial fibrillation.   - Home regimen : Eliquis 5 mg PO BID at home for hx of of afib and IJ thrombi  - EKG on admission - NSR  - hold lopressor 25 BID (3/4)  - off heparin gtt  - hold home ASA    # Vascular disorder of lower extremity   - Extremities appear cool and dry   - c/w local wound care by podiatry     ===================HEMATOLOGIC/ONC ===================  # Hx of hemolytic anemia, Nicole Syndrome  - Follows with Dr. Rosario as outpatient  - Prednisone 15 mg PO QD --> switched to Solu-cortef 75mg IV q8hr (2/26-3/3) > back to prednisone 15mg (3/3)  - f/u hematology/oncology recs  - Monitor H&H daily  - f/u hemolysis labs and peripheral smear    # DVT ppx: hold home ASA 81mg    ===================== RENAL =========================  # NORMAN on CKD  # ATN 2/2 septic shock  - Scr ranged from 1.3-2.1 in 2023. Most recent Scr was 1.75 on 1/30/24. On admission, Scr was 1.9 >>> 6  - No improvement with IVF, likely ATN i/s/o contrast/medications  - CRRT stopped 3/3 8pm with initial plan for intermittent HD  - trial bumex 4mg IV x1 (3/4) -> no output, bladder scan 3/5 with 180cc urine  - pt on levo s/p cardiac arrest 3/4, likely unable to tolerate HD/CRRT at this time  - sodium bicarb 1300mg TID  - Nephro following: No HD     # Hyperkalemia (resolved)   - K 6.2 at ED arrival  - Shifted in the ED, no EKG changes consistent with severe hyper K. Cr not significantly elevated form baseline, c/f RTA from bactrim and Tacro. Stable   - s/p Lokelma 10mg BID for 3 days  - dialysis as above    ==================== GASTROINTESTINAL===================  # Ileus, resolved  #+/- coffee ground emesis, resolved  - 1 episode of coffee ground emesis, unclear if true GIB  - CTAP (2/22) ileus vs partial SBO - cannot r/o GI bleed  - c/w PPI IV 40 BID  - feeds held 3/4 s/p cardiac arrest  - rising AST/ALT, likely shock liver i/s/o cardiac arrest    =======================    ENDOCRINE  =====================  # DMT2  - A1c 5.8  - ISS    ========================INFECTIOUS DISEASE================  # Septic shock   # Hypothermia  # bacteremia  - Presented with T 91.4F, WBC 21 concerning for possible occult infection,   - U/A without LE or Nitrites, CXR without clear consolidation, MRSA negative Lower concern for meningitis at this moment  - 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 No manifest signs of bowel ischemia. Interval worsening of right lower lobe and right middle lobe atelectasis secondary to elevated right hemidiaphragm when compared to prior CT dated 7/11/2023. Superimposed infection in the collapsed lungs is not excluded  - meropenem (2/21-2/24), restarted meropenem for CNS coverage (2/28 - )  - s/p vancomycin for CNS coverage (2/28 - 3/4)  - s/p zosyn (2/24 - 2/28)  - MRSA swab negative  - hold home Valganciclovir for ppx --> switch to ganciclovir ppx  - f/u infectious labs - Toxoplasma, EBV, Fungitell, Galactomannan, CMV, Cryptococcus, MRSA, ASCENCION virus, CMV, Crypto, ASCENCION virus  - f/u GI PCR, consider C. Diff testing   - Transplant ID recommendations     Code status: LiveOn case   currently on Levo and Vaso   CRRT     Addendum:(5:42pm): The patient's death is not reportable to ME office   Discussed with Dr. Negron   Health care proxy was contacted and informed ( Marion Quintero)   ====================ASSESSMENT ==============  75yo M w/ pmhx HTN, HLD, nonischemic cardiomyopathy, chronic systolic heart failure s/p HM2 LVAD (6/2017), s/p heart transplant from Hep. 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 Found to have septic shock, (2/19 BCx pseudo, 2/22 BCx NGTD). Still episodes of hypotension. Worsening NORMAN. Accepted to MICU for CRRT.    Plan:  ====================== NEUROLOGY=====================  # Metabolic encephalopathy  - Likely multi-factorial etiologies: septic vs uremic vs hypercapnic, less likely meningitis  - Patient baseline AOx3. AOx1 at ED arrival, iso of infection vs metabolic derangements  - CTH and angio in ED w/o hemorrhage or stenosis   - spot EEG 2/21: negative for seizures  - s/p PEA arrest (3/4) with approx. 30min downtime  - Currently A&Ox0, unresponsive to pain, pupils fixed and dilated  - CT head (3/5): negative for bleed or lesions  - 24hr EEG (3/6): negative for seizures  - f/u neuron specific enolase  - Neuro following  - Declared brain dead 3/9    ==================== RESPIRATORY======================  - intubated (3/4) s/p cardiac arrest  - Vent settings: 500/6/18/40%  - CXR with R-sided moderate effusion (3/5)  - MV setting adjustment for target GIGI 35-45 PH 7.45  - Extubation planned 3/10    ====================CARDIOVASCULAR==================  # Septic shock from pseudomonas bacteremia  - POCUS reveals no evidence of  fluid overload  - s/p fluid resuscitation    #PEA arrest (3/4)  - approx. 30min downtime  - Pressors: levo 0.4 (3/4- )  - lactate 7.4, now downtrending lactate 2.3-2.5 remains stable     # h/o heart transplant  - Nonischemic cardiomyopathy, chronic systolic heart failure s/p HM2 LVAD (6/2017), s/p heart transplant from Hep. C donor (treated) 2/23/18  - Intolerant of Cellcept due to leukopenia  - hold home tacro, per heart failure recs  - c/w home prednisone 15mg daily  - c/w Atovaquone for ppx  - c/w nystatin for ppx  - hold home ASA    # Paroxysmal atrial fibrillation.   - Home regimen : Eliquis 5 mg PO BID at home for hx of of afib and IJ thrombi  - EKG on admission - NSR  - hold lopressor 25 BID (3/4)  - off heparin gtt  - hold home ASA    # Vascular disorder of lower extremity   - Extremities appear cool and dry   - Local wound care by podiatry     ===================HEMATOLOGIC/ONC ===================  # Hx of hemolytic anemia, Nicole Syndrome  - Follows with Dr. Rosario as outpatient  - Prednisone 15 mg PO QD --> switched to Solu-cortef 75mg IV q8hr (2/26-3/3) > back to prednisone 15mg (3/3)  - f/u hematology/oncology recs  - Monitor H&H daily  - f/u hemolysis labs and peripheral smear    # DVT ppx: hold home ASA 81mg    ===================== RENAL =========================  # NORMAN on CKD  # ATN 2/2 septic shock  - Scr ranged from 1.3-2.1 in 2023. Most recent Scr was 1.75 on 1/30/24. On admission, Scr was 1.9 >>> 6  - No improvement with IVF, likely ATN i/s/o contrast/medications  - CRRT stopped 3/3 8pm with initial plan for intermittent HD  - trial bumex 4mg IV x1 (3/4) -> no output, bladder scan 3/5 with 180cc urine  - pt on levo s/p cardiac arrest 3/4, likely unable to tolerate HD/CRRT at this time  - sodium bicarb 1300mg TID  - Nephro following: No HD     # Hyperkalemia (resolved)   - K 6.2 at ED arrival  - Shifted in the ED, no EKG changes consistent with severe hyper K. Cr not significantly elevated form baseline, c/f RTA from bactrim and Tacro. Stable   - s/p Lokelma 10mg BID for 3 days  - dialysis as above    ==================== GASTROINTESTINAL===================  # Ileus, resolved  #+/- coffee ground emesis, resolved  - 1 episode of coffee ground emesis, unclear if true GIB  - CTAP (2/22) ileus vs partial SBO - cannot r/o GI bleed  - c/w PPI IV 40 BID  - feeds held 3/4 s/p cardiac arrest  - rising AST/ALT, likely shock liver i/s/o cardiac arrest    =======================    ENDOCRINE  =====================  # DMT2  - A1c 5.8  - ISS    ========================INFECTIOUS DISEASE================  # Septic shock   # Hypothermia  # bacteremia  - Presented with T 91.4F, WBC 21 concerning for possible occult infection,   - U/A without LE or Nitrites, CXR without clear consolidation, MRSA negative Lower concern for meningitis at this moment  - 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 No manifest signs of bowel ischemia. Interval worsening of right lower lobe and right middle lobe atelectasis secondary to elevated right hemidiaphragm when compared to prior CT dated 7/11/2023. Superimposed infection in the collapsed lungs is not excluded  - meropenem (2/21-2/24), restarted meropenem for CNS coverage (2/28 - )  - s/p vancomycin for CNS coverage (2/28 - 3/4)  - s/p zosyn (2/24 - 2/28)  - MRSA swab negative  - hold home Valganciclovir for ppx --> switch to ganciclovir ppx  - f/u infectious labs - Toxoplasma, EBV, Fungitell, Galactomannan, CMV, Cryptococcus, MRSA, ASCENCION virus, CMV, Crypto, ASCENCION virus  - f/u GI PCR, consider C. Diff testing   - Transplant ID recommendations     Code status: LiveOn case   currently on Levo and Vaso   CRRT   Declared brain dead 3/9.    Addendum:(5:42pm): The patient's death is not reportable to ME office   Discussed with Dr. Negron   Health care proxy was contacted and informed ( Marion Quintero)

## 2025-03-10 NOTE — PROGRESS NOTE ADULT - ASSESSMENT
73M w/ HTN, HLD, nonischemic cardiomyopathy, chronic systolic heart failure s/p HM2 LVAD (6/2017), s/p heart transplant from Hep. 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 Admitted with septic shock w/ pseudomonas bacteremia.    NORMAN on CKD3 iso septic shock w/ pseudomonas bacteremia, now requiring dialysis.  Required CRRT 2/23-3/3 iso worsening oliguric renal failure w/ hemodynamic instability. CRRT stopped on 3/3 in preparation to transition to iHD. However pt w/ PEA arrest overnight 3/4, now intubated and w/ high IV vasopressor requirements (capped). Back on CRRT 3/9 for organ donation purposes. Post liver biopsy deemed not a candidate for organ donation, CRRT discontinued, with planned elective ventilator withdrawal today. Nephrology will sign off.     If you have any questions, please feel free to contact me  Boston Caballero  Nephrology Fellow  [employer] Git/Page [phone]  (After 5pm or on weekends please page the on-call fellow)

## 2025-03-10 NOTE — PROGRESS NOTE ADULT - REASON FOR ADMISSION
Lethargy

## 2025-03-10 NOTE — AIRWAY REMOVAL NOTE  ADULT & PEDS - ARTIFICAL AIRWAY REMOVAL COMMENTS
Written order for extubation verified. The patient was identified by full name and birth date compared to the identification band. Present during the procedure was tamar dietz

## 2025-03-11 ENCOUNTER — RX RENEWAL (OUTPATIENT)
Age: 75
End: 2025-03-11

## 2025-03-12 ENCOUNTER — APPOINTMENT (OUTPATIENT)
Dept: OPHTHALMOLOGY | Facility: CLINIC | Age: 75
End: 2025-03-12

## 2025-03-12 LAB
NSE FLD-MCNC: 1104 NG/ML — HIGH (ref 0–17.6)
NSE FLD-MCNC: 1303 NG/ML — HIGH (ref 0–17.6)

## 2025-03-13 LAB
AMPA-RECEPTOR ANTIBODY BY CBA, SERUM: NEGATIVE — SIGNIFICANT CHANGE UP
AMPHIPHYSIN AB TITR SER: NEGATIVE — SIGNIFICANT CHANGE UP
CASPR2-IGG CBA, SERUM: NEGATIVE — SIGNIFICANT CHANGE UP
CRMP-5-IGG WESTERN BLOT: NEGATIVE — SIGNIFICANT CHANGE UP
GABA-B-RECEPTOR ANTIBODY BY CBA, SERUM: NEGATIVE — SIGNIFICANT CHANGE UP
GAD65 AB SER-MCNC: 0.07 NMOL/L — HIGH
GFAP ALPHA IGG SER QL IF: NEGATIVE — SIGNIFICANT CHANGE UP
GLIAL NUC TYPE 1 AB TITR SER: NEGATIVE — SIGNIFICANT CHANGE UP
HU1 AB TITR SER: NEGATIVE — SIGNIFICANT CHANGE UP
HU2 AB TITR SER IF: NEGATIVE — SIGNIFICANT CHANGE UP
HU3 AB TITR SER: NEGATIVE — SIGNIFICANT CHANGE UP
IFA NOTES: SIGNIFICANT CHANGE UP
IMMUNOLOGIST REVIEW: SIGNIFICANT CHANGE UP
LGI1-IGG CBA, SERUM: NEGATIVE — SIGNIFICANT CHANGE UP
MGLUR1 IGG SER QL IF: NEGATIVE — SIGNIFICANT CHANGE UP
NEUROCHONDRIN AB SERPL QL IF: NEGATIVE — SIGNIFICANT CHANGE UP
NMDAR1 IGG SER QL CBA IFA: NEGATIVE — SIGNIFICANT CHANGE UP
PCA-1 AB TITR SER: NEGATIVE — SIGNIFICANT CHANGE UP
PCA-2 AB TITR SER: NEGATIVE — SIGNIFICANT CHANGE UP
PCA-TR AB TITR SER: NEGATIVE — SIGNIFICANT CHANGE UP

## 2025-03-14 NOTE — H&P CARDIOLOGY - PSH
Emailed HRS to see if referral could be expedited.     AICD (Automatic Cardioverter/Defibrillator) Present  [place] with 1 [place] lead4/1/09- explanted and replaced with APImetricstronic 2 leads on 9/2/09  H/O heart transplant  2/2018  History of Prior Ablation Treatment  for afib  S/P right heart catheterization  biopsy multiple  Status post left hip replacement

## 2025-03-21 NOTE — BH CONSULTATION LIAISON ASSESSMENT NOTE - SUICIDE ATTEMPT:
"Tylenol/Motrin for fever or body aches  Zofran- every 6 hours as needed for nausea/vomiting   Increase fluid intake  Follow up with primary care provider    Get MRI of pancreas/abdomen  \"Multiple pancreatic cystic lesions, measuring up to 2.1 cm in diameter. 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 Further evaluation by gadolinium enhanced MRI of the abdomen is recommended. \"    "
None known

## 2025-04-08 NOTE — ED ADULT NURSE NOTE - NS TRANSFER PATIENT BELONGINGS
Left message and requesting for a call back. May transfer to the back line.    
Spoke with pt and offered a different appointment with PCP and agreed. Scheduled for 4/10/25 @ 10 am  
Clothing

## 2025-04-29 NOTE — PATIENT PROFILE ADULT. - NS PRO PT REFERRAL QUES 2 YN
Refill Request  Medication name: Pending Prescriptions:                       Disp   Refills    estradiol (VIVELLE-DOT) 0.05 MG/24HR bi-w*24 pat*1            Sig: Place onto the skin twice a week.    methocarbamol (ROBAXIN) 500 MG tablet     60 tab*3            Sig: Take 1 tablet (500 mg) by mouth 3 times daily as           needed for muscle spasms.    Requested Pharmacy:  [address]   
Pharmacy change request  
Pharmacy change request  
Refill Request  Medication name: Pending Prescriptions:                       Disp   Refills    estradiol (VIVELLE-DOT) 0.05 MG/24HR bi-w*24 pat*1            Sig: Place onto the skin twice a week.  Patient has been out of Medication for a month    Requested Pharmacy:  [facility] [id number] - [place], [place] - [address]   
no

## 2025-05-05 ENCOUNTER — APPOINTMENT (OUTPATIENT)
Dept: HEART FAILURE | Facility: CLINIC | Age: 75
End: 2025-05-05

## 2025-07-08 NOTE — DISCHARGE NOTE PROVIDER - DISCHARGE SERVICE FOR PATIENT
on the discharge service for the patient. I have reviewed and made amendments to the documentation where necessary.
Pt presents to ED here for b/l feet welling, pt on water pills and pt states" I have been detoxing from prednisone". Denies CP or SOB. Pt A&Ox4, NAD and ambulates w/ cane.

## 2025-07-17 NOTE — DIETITIAN INITIAL EVALUATION ADULT. - SOURCE
Received fax from [facility] (visit 7/16/2025).    Fax put on providers desk.   chart/electronic medical records/patient

## 2025-07-23 ENCOUNTER — APPOINTMENT (OUTPATIENT)
Dept: INTERNAL MEDICINE | Facility: CLINIC | Age: 75
End: 2025-07-23

## 2025-07-24 NOTE — PROGRESS NOTE ADULT - ATTENDING COMMENTS
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    Please obtain bilateral LE duplex (will help with access prior to biopsy)  If needed, can hold argatraban 6 hours prior to bx and resume when deemed safe from a bleeding perspective without bolus.      Thanks      Saurabh Tavarez
